# Patient Record
Sex: MALE | NOT HISPANIC OR LATINO | Employment: OTHER | URBAN - METROPOLITAN AREA
[De-identification: names, ages, dates, MRNs, and addresses within clinical notes are randomized per-mention and may not be internally consistent; named-entity substitution may affect disease eponyms.]

---

## 2017-01-16 ENCOUNTER — VIRTUAL VISIT (OUTPATIENT)
Dept: EDUCATION SERVICES | Facility: CLINIC | Age: 52
End: 2017-01-16

## 2017-01-16 DIAGNOSIS — E11.9 TYPE II DIABETES MELLITUS (H): Primary | ICD-10-CM

## 2017-01-16 DIAGNOSIS — Z79.4 TYPE 2 DIABETES MELLITUS WITH HYPERGLYCEMIA, WITH LONG-TERM CURRENT USE OF INSULIN (H): ICD-10-CM

## 2017-01-16 DIAGNOSIS — E11.65 TYPE 2 DIABETES MELLITUS WITH HYPERGLYCEMIA, WITH LONG-TERM CURRENT USE OF INSULIN (H): ICD-10-CM

## 2017-01-16 NOTE — PROGRESS NOTES
Diabetes Educator Note:    Miller calls because   1.  He received a letter from his new insurance company that indicates his short acting insulin will be changed from Humalog to Novolog.  2.  His blood glucoses have been more elevated for the past few weeks.  He states that he has not been feeling well and has not been as active as he typically is.     Discussed the differences between Novolog and Humalog and reassured that he will not notice a change when switching.  Reviewed the action of the pen, storage and administration, none of which is remarkably different between Novolog and Humalog.  Prescription for Novolog sent to his pharmacy.  He is currently taking Tresiba insulin 100 units daily which has been working pretty well.  His states that since the beginning of the year (about 2 weeks),  His fasting blood glucoses have been running between 98 and 276.  Denies missing any of his insulin doses.  Advised for the time being to increase his Tresiba from 100 to 110 units per day.  He states that outside of feeling sort of tired, he does not otherwise feel ill.  Advised to contact his primary care provider if he continues to feel tired and BG's are still elevated after increasing his Tresiba dose.  Also advised to continue to use up his Humalog and then start the Novolog.      Time spent in this telephone visit:  20 minutes.

## 2017-01-26 ENCOUNTER — TELEPHONE (OUTPATIENT)
Dept: GASTROENTEROLOGY | Facility: CLINIC | Age: 52
End: 2017-01-26

## 2017-01-26 DIAGNOSIS — K76.82 HEPATIC ENCEPHALOPATHY (H): Primary | ICD-10-CM

## 2017-01-26 NOTE — TELEPHONE ENCOUNTER
Pt left VM re: Xifaxan. New insurance requiring PA. Not able to immediately reach pt to determine insurance company. Can be reached at [phone]. Message sent to [employer].

## 2017-01-26 NOTE — Clinical Note
Dr. Banuelos,    Pt was denied for tier exception for Xifaxan. Potential for appeal. Used an appeal letter template for medical necessity for medication.     Please review and advise any additions/changes. Information was based off office visit. Longest duration is for 12 months coverage.

## 2017-01-26 NOTE — TELEPHONE ENCOUNTER
Prior Authorization Specialty Medication Request    Medication/Dose: Xifaxan 550mg twice daily by mouth  Diagnosis and ICD: hepatic encephalopathy k72.90  New/Renewal/Insurance Change PA:     Important Lab Values: AST 55; Total bilirubin 1.5    Previously Tried and Failed Therapies: lactulose alone    Rationale: Xifaxan helps to lower the toxin build up in the blood while lactulose works by cleansing the toxin build up in the liver. Adjunctive therapy.    Would you like to include any research articles?    If yes please include the hyperlink(s) below or fax @ [phone].    (Include Name and MRN)    If you received a fax notification from an outside Pharmacy;  Pharmacy Name: [id number]  Pharmacy #: [phone]  Pharmacy Fax: [phone]    Xifaxan - New Insurance  Authorization Needed  Message [id number]     From   Frandy Workman    To   Santi Dotson MD    Sent   1/26/2017  1:38 PM        Dr. Banuelos:     My insurance changed effective January 1.2017 to [employer] Part D [employer] ([employer]).  The new company is requesting a  Authorization from you on my Xifaxan 550MG Tabs (Twice a Day - 60 pills per month).  My new insurance information is as follows:     Name: Frandy OLIVER Workman   ID#: [id number]     The call in number for them is [phone].  They covered me for January but I will need the authorization completed by Early February.     Let me know if you have any questions.     Best regards,

## 2017-01-26 NOTE — LETTER
2017      [facility]  [address]  [place] [address]        RE: Frandy Workman  [address]   [facility] [address]  : 1965  MRN: [id number]  Policy #: [id number]  Case/Reference: [id number]    To Whom It May Concern,    I am writing on behalf of my patient, Frandy Workman to document the medical necessity of Xifaxan 550mg by mouth twice daily for the treatment of hepatic encephalopathy. This letter provides information about the patient's medical history and diagnosis and a statement summarizing my treatment rationale.     Summary of Patient History and Diagnosis  Patient follows at [facility] Hepatology clinic for evaluation and management of ESTRADA cirrhosis.  This was diagnosed in  after a long medical evaluation of tremor.  He was listed for liver transplant at [facility] in May 2014 for hepatic encephalopathy resulting in 5 hospital admissions including one admission to MICU.  He reports that his MELD was 35 at one point.  He was de-listed earlier this year as his MELD score had significantly improved.    Patient is well.  He has been getting serial EGD with banding for esophageal varices.  He is also taking propanolol.  He has never had a variceal bleed.    Patient reports that his ascites and hepatic encephalopathy are well-controlled on medical therapy. His last major episode of hepatic encephalopathy was 6-8 months ago.  He still has issues with fatigue. His current MELD-Na score is 12 according to the MELD calculator on the [place] Department of Health and Human Services website (https://optn.transplant.hrsa.gov/resources/allocation-calculators/meld-calculator/).    Treatment Rationale  Xifaxan helps to lower the toxin build up in the blood while lactulose works by cleansing the toxin build up.  in the liver. Adjunctive therapy. Patient is able to reduce hospitalizations that are based on symptoms of hepatic encephalopathy with  this medication management. Patient unable to do so with lactulose alone and Xifaxan is the only drug that is appropriate for treatment of hepatic encephalopathy (HE) outside of lactulose. A journal article link is provided showing the average cost of a year's rifaximin prescription vs the average cost in savings for patients hospitalized and treated with rifaximin (http://onlinelibrary.cotton.com/doi/10.1111/jessica.20472/full). Average savings in hospital costs for HE patients was  1480- 3228 after deducting a years cost of rifaximin first.    Duration  Requesting 12 months approval for Xifaxan coverage.    Summary  In summary, Xifaxan is medically necessary for this patient s medical condition. Please call my office at [phone] if I can provide you with any additional information to approve my request. I look forward to receiving your timely response and approval of this request.     Sincerely,      Santi Banuelos MD   of Transplant Hepatology and Gastroenterology

## 2017-02-01 ENCOUNTER — OFFICE VISIT (OUTPATIENT)
Dept: EDUCATION SERVICES | Facility: CLINIC | Age: 52
End: 2017-02-01

## 2017-02-01 VITALS — WEIGHT: 189.4 LBS | BODY MASS INDEX: 28.05 KG/M2 | HEIGHT: 69 IN

## 2017-02-01 DIAGNOSIS — E11.9 DIABETES MELLITUS WITHOUT COMPLICATION (H): Primary | ICD-10-CM

## 2017-02-01 NOTE — PATIENT INSTRUCTIONS
1. When you snack between meals, take your Humalog insulin: 1 unit per 5 grams. There is no need to test your blood sugar between meals unless you feel low.  2. When you are low, try to limit yourself to only juice; either 4 ounces (1/2 cup) for 15 grams carb or 8 oz (1cup) for 30 grams carb. Wait 15 minutes and retest your blood sugar again to see if you are ok or need more juice depending on your blood sugar.  3. At night, limit yourself to one snack with carbs so you can take Humalog. After that, if you are still hungry, try eating low or no carb snacks reviewed today so you don't have to take Humalog again and your blood sugars won't go too high  4. If you are going to walk for more than 30 minutes, drink 4 oz juice or 15 grams carbohydrate to prevent low blood sugar.   5. We reviewed carbs again in the menus your brought  6. Follow up as needed  Julia Lee RD, LD, CDE  Diabetes Care  [address]  [address]  [place]  [address]  Phone: [phone]  Appointment line: [phone]  Email: [email or web address]

## 2017-02-01 NOTE — Clinical Note
Hi Dr. Wilcox, you will be seeing Miller again in 2 weeks. I saw him today, his bg are all above target as he stopped using chewing tobacco and is eating more, especially snacking more and not taking Humalog for snacks. We reviewed this today and I told him to take Humalog for all snacks, but to cut down as well if possible with snacking. He sees you again in 2 weeks, I did not make any insulin adjustments today. Thanks, Julia Lee RD, LD, CDE Diabetes Care [address] [place] [address] Phone: [phone] Appointment line: [phone] Email: [email or web address]

## 2017-02-01 NOTE — TELEPHONE ENCOUNTER
[facility] Prior Authorization Team   Phone: [phone]  Fax: [phone]      PA Initiation    Medication: Xifaxan 550mg  Insurance Company: [employer] - Phone [phone] Fax [phone]  Pharmacy Filling the Rx: [id number] - [place], [place] - [address]  Filling Pharmacy Phone: [phone]  Filling Pharmacy Fax: [phone]  Start Date: 2/1/2017

## 2017-02-01 NOTE — MR AVS SNAPSHOT
After Visit Summary   2/1/2017    Frandy Workman    MRN: [id number]           Patient Information     Date Of Birth          1965        Visit Information        Provider Department      2/1/2017 1:30 PM Julia Lee RD [facility] Diabetes        Care Instructions    1. When you snack between meals, take your Humalog insulin: 1 unit per 5 grams. There is no need to test your blood sugar between meals unless you feel low.  2. When you are low, try to limit yourself to only juice; either 4 ounces (1/2 cup) for 15 grams carb or 8 oz (1cup) for 30 grams carb. Wait 15 minutes and retest your blood sugar again to see if you are ok or need more juice depending on your blood sugar.  3. At night, limit yourself to one snack with carbs so you can take Humalog. After that, if you are still hungry, try eating low or no carb snacks reviewed today so you don't have to take Humalog again and your blood sugars won't go too high  4. If you are going to walk for more than 30 minutes, drink 4 oz juice or 15 grams carbohydrate to prevent low blood sugar.   5. We reviewed carbs again in the menus your brought  6. Follow up as needed  Julia Lee RD, LD, CDE  Diabetes Care  [address]  [address] [place] [address]  [address]  Phone: [phone]  Appointment line: [phone]  Email: [email or web address]            Follow-ups after your visit        Your next 10 appointments already scheduled     Feb 20, 2017  1:00 PM   (Arrive by 12:30 PM)   RETURN ENDOCRINE with Jennifer Wilcox MD   [facility] Endocrinology ([facility] [place])    [address] [place] [address]  3rd [facility] [address] [phone]            Mar 09, 2017  1:00 PM   (Arrive by 12:45 PM)   Return Visit with Natalie Russell MD   [facility] Primary Care Clinic ([facility] [place])    [address] [place] [address]  4th [facility] [address]    [phone]            Apr 17, 2017  1:00 PM   (Arrive by 12:45 PM)   Return General Liver with Santi Dotson MD   [facility] Hepatology ([facility] [place])    [address] [place] [address] [phone]              Who to contact     Please call your clinic at [phone] to:    Ask questions about your health    Make or cancel appointments    Discuss your medicines    Learn about your test results    Speak to your doctor   If you have compliments or concerns about an experience at your clinic, or if you wish to file a complaint, please contact [facility] Patient Relations at [phone] or email us at [email or web address]         Additional Information About Your Visit        [employer] Information     [employer] gives you secure access to your electronic health record. If you see a primary care provider, you can also send messages to your care team and make appointments. If you have questions, please call your primary care clinic.  If you do not have a primary care provider, please call [phone] and they will assist you.      [employer] is an electronic gateway that provides easy, online access to your medical records. With [employer], you can request a clinic appointment, read your test results, renew a prescription or communicate with your care team.     To access your existing account, please contact your [facility] or call [phone] for assistance.        Care EveryWhere ID     This is your Care EveryWhere ID. This could be used by other organizations to access your [facility] medical records  [id number]         Blood Pressure from Last 3 Encounters:   12/08/16 117/73   11/17/16 99/61   11/16/16 132/77    Weight from Last 3 Encounters:   12/19/16 84.142 kg (185 lb 8 oz)   12/08/16 84.369 kg (186 lb)   11/21/16 90.357 kg (199 lb 3.2 oz)              Today, you had the following     No  orders found for display       Primary Care Provider Office Phone # Fax #    Natalie Mitzi Andrés Russell [phone]       [address]  [facility] [address]        Thank you!     Thank you for choosing [facility]  for your care. Our goal is always to provide you with excellent care. Hearing back from our patients is one way we can continue to improve our services. Please take a few minutes to complete the written survey that you may receive in the mail after your visit with us. Thank you!             Your Updated Medication List - Protect others around you: Learn how to safely use, store and throw away your medicines at www.disposemymeds.org.          This list is accurate as of: 2/1/17  2:23 PM.  Always use your most recent med list.                   Brand Name Dispense Instructions for use    aspirin 81 MG tablet     90 tablet    Take 1 tablet (81 mg) by mouth daily       BD VIKTORIA U/F 32G X 4 MM   Generic drug:  insulin pen needle      U 6 D       blood glucose monitoring lancets     3 Box    Use to test blood sugar 4 times daily or as directed.  1 box = 102 lancets       blood glucose monitoring test strip    ACCU-CHEK EDINSON PLUS    400 each    Use to test blood sugar 4 times daily       carvedilol 12.5 MG tablet    COREG    180 tablet    Take 1 tablet (12.5 mg) by mouth 2 times daily (with meals)       insulin aspart 100 UNIT/ML injection    NovoLOG FLEXPEN    24 mL    1 unit per 4 Gms CHO at meals and snacks + correction scale of 1 unit per 25 mg/dL over 125. Average daily dose is 75 units.       insulin degludec 200 UNIT/ML pen    TRESIBA    18 mL    Take 110 units daily.       lactulose 10 GM/15ML solution    CHRONULAC     Take 60 mLs by mouth 4 times daily       OLANZapine 2.5 MG tablet    zyPREXA    30 tablet    Take 1 tablet (2.5 mg) by mouth At Bedtime       omeprazole 40 MG capsule    priLOSEC     Take 40 mg by mouth daily       potassium chloride SA  20 MEQ CR tablet    K-DUR/KLOR-CON M     Take 40 mEq by mouth every morning       RA VITAMIN B-12 TR 1000 MCG Tbcr   Generic drug:  cyanocobalamin      Take 1,000 mcg by mouth daily       rifaximin 550 MG Tabs tablet    XIFAXAN    60 tablet    Take 1 tablet (550 mg) by mouth 2 times daily       sildenafil 50 MG cap/tab    REVATIO/VIAGRA    12 tablet    Take 0.5-1 tablets (25-50 mg) by mouth daily as needed for erectile dysfunction Take 30 min to 4 hours before intercourse       simvastatin 20 MG tablet    ZOCOR    90 tablet    Take 1 tablet (20 mg) by mouth At Bedtime       spironolactone 25 MG tablet    ALDACTONE     Take 25 mg by mouth daily       tadalafil 5 MG tablet    CIALIS    20 tablet    Take 1 tablet (5 mg) by mouth as needed for erectile dysfunction       tamsulosin 0.4 MG capsule    FLOMAX     Take 0.4 mg by mouth daily       THERAVITE PO      Take 1 tablet by mouth daily

## 2017-02-07 NOTE — TELEPHONE ENCOUNTER
Prior Authorization Approval    Authorization Effective Date: 12/30/2016  Authorization Expiration Date: 12/31/2017  Medication: Xifaxan 550mg- approved  Approved Dose/Quantity:   Reference #: [id number]   Insurance Company: [employer] - Phone [phone] Fax [phone]  Expected CoPay: $700.00     CoPay Card Available:      Foundation Assistance Needed:    Which Pharmacy is filling the prescription (Not needed for infusion/clinic administered): [id number] - [place], [place] - [address]  Pharmacy Notified: Yes  Patient Notified: Yes, pt picked up script on 01/22/17

## 2017-02-07 NOTE — TELEPHONE ENCOUNTER
Tier exception Initiation and denial    We have initiated an appeal for the requested medication:  Medication: Xifaxan 550mg- approved  Appeal Start Date:     Insurance Company:    Comments:       Medication is at a tier 5, specialty tier, and there is no tier exception available for this tier. I have a denial for the tier request.    Appeal is available

## 2017-02-14 ASSESSMENT — ENCOUNTER SYMPTOMS
SORE THROAT: 0
ALTERED TEMPERATURE REGULATION: 1
STIFFNESS: 1
SHORTNESS OF BREATH: 0
FEVER: 1
COUGH: 1
POLYPHAGIA: 1
MUSCLE WEAKNESS: 1
WHEEZING: 0
DYSPNEA ON EXERTION: 0
POSTURAL DYSPNEA: 0
EYE WATERING: 0
SNORES LOUDLY: 1
DECREASED APPETITE: 0
NECK MASS: 0
JAUNDICE: 0
CONSTIPATION: 0
CHILLS: 0
NIGHT SWEATS: 1
SWOLLEN GLANDS: 0
BACK PAIN: 1
HALLUCINATIONS: 0
HEARTBURN: 0
WEIGHT GAIN: 1
MYALGIAS: 0
SINUS PAIN: 1
TROUBLE SWALLOWING: 0
HEMOPTYSIS: 0
JOINT SWELLING: 0
EYE PAIN: 0
SKIN CHANGES: 0
EYE REDNESS: 1
POLYDIPSIA: 0
BLOATING: 1
RECTAL BLEEDING: 0
BLOOD IN STOOL: 0
EYE IRRITATION: 1
NECK PAIN: 0
SINUS CONGESTION: 1
BOWEL INCONTINENCE: 0
HOARSE VOICE: 1
COUGH DISTURBING SLEEP: 0
MUSCLE CRAMPS: 0
SMELL DISTURBANCE: 0
WEIGHT LOSS: 0
NAIL CHANGES: 1
VOMITING: 0
TASTE DISTURBANCE: 0
INCREASED ENERGY: 1
ARTHRALGIAS: 0
DOUBLE VISION: 0
FATIGUE: 1
RECTAL PAIN: 0
POOR WOUND HEALING: 1
ABDOMINAL PAIN: 0
BRUISES/BLEEDS EASILY: 1
SPUTUM PRODUCTION: 1
NAUSEA: 0
DIARRHEA: 1
RESPIRATORY PAIN: 0

## 2017-02-14 NOTE — TELEPHONE ENCOUNTER
Medication Appeal Initiation    We have initiated an appeal for the requested medication:  Medication: Xifaxan 550mg- PA Approved - Appeal (Tier) Initiated  Appeal Start Date:  2/14/2017  Insurance Company: [employer] - Phone [phone] Fax [phone]  Comments:  Letter of medical necessity has been faxed to [employer] Appeals Dept @ [phone].

## 2017-02-17 NOTE — TELEPHONE ENCOUNTER
MEDICATION APPEAL DENIED    Medication: Xifaxan 550mg- PA Approved - Appeal (Tier) Initiated - Denied     Denial Date: 2/15/2017    Denial Rational:   Tiering exception appeal was denied stating xifaxan is a specialty medication. A second level appeal is available     Second Level Appeal Information: Second level appeals will be managed by the clinic staff and provider. Please contact the [employer] Prior Authorization Team if additional information about the denial is needed.

## 2017-02-20 ENCOUNTER — OFFICE VISIT (OUTPATIENT)
Dept: ENDOCRINOLOGY | Facility: CLINIC | Age: 52
End: 2017-02-20

## 2017-02-20 VITALS
DIASTOLIC BLOOD PRESSURE: 75 MMHG | BODY MASS INDEX: 28.58 KG/M2 | HEART RATE: 66 BPM | WEIGHT: 193 LBS | HEIGHT: 69 IN | SYSTOLIC BLOOD PRESSURE: 115 MMHG

## 2017-02-20 DIAGNOSIS — E11.65 TYPE 2 DIABETES MELLITUS WITH HYPERGLYCEMIA, WITH LONG-TERM CURRENT USE OF INSULIN (H): Primary | ICD-10-CM

## 2017-02-20 DIAGNOSIS — Z79.4 TYPE 2 DIABETES MELLITUS WITH HYPERGLYCEMIA, WITH LONG-TERM CURRENT USE OF INSULIN (H): Primary | ICD-10-CM

## 2017-02-20 LAB — HBA1C MFR BLD: 7.4 % (ref 4.3–6)

## 2017-02-20 ASSESSMENT — PAIN SCALES - GENERAL: PAINLEVEL: NO PAIN (0)

## 2017-02-20 NOTE — NURSING NOTE
Chief Complaint   Patient presents with     RECHECK     F/U TYPE II DM     Alisha Cotto, [facility]  Endocrinology & Diabetes 3G

## 2017-02-20 NOTE — PROGRESS NOTES
Assessment/Treatment Plan:      Frandy Workman is a 51 year old year old male with    1. Type 2 Diabetes since early 30s with improving BG control with neuropathy.   A1c 7.8 last visit improved to 7.4 today  There is no evidence of chronic diabetic complications.  A1c is not a reliable indicator of glycemic control: Anemia, does not correlate with BG levels. However, improving A1c is reassuring.       Major barrier to achieving glycemic control are:   HS snack: check BG at 2 am once or twice a week.   BG is high all day long: diet with high carb in it.   Insulin Resistance and weight gain on insulin: We will reach out to Dr Banuelos to see if he is agreeable with Metformin low dose start. This may reduce his insulin requirement and further improve glycemic control.      Patient Instructions   Test BG once or twice a week at 2 am    We will contact you after contacting Dr Banuelos.           Test and/or medications prescribed today:  Orders Placed This Encounter   Procedures     PODIATRY/FOOT & ANKLE SURGERY REFERRAL     Hemoglobin A1c POCT     Jennifer Wilcox MD  [id number]  Endocrinology Service        =================================================    Endocrinology Clinic Visit    Chief Complaint: RECHECK (F/U TYPE II DM)       Subjective:         HPI: Frandy Workman is a 51 year old year old male with history of ESTRADA with liver cirrhosis who is seen in follow up for T2DM - uncontrolled. He recently moved from [place] to [place] to be close to his R and his BG has been uncontrolled now. He attributes it to lack of exercise as he did and diet changes in the assisted living. His A1c in the [facility] was better but on review of records from the past his BG were still high back in [place].     He has had diabetes since 2001. He was intially started on Metformin but after diagnosis of liver failure, this was discontinued.     Neuropathy - tingling in feet bilateral   Nephropathy - none  Retinopathy - no per patient (exam  2016)   Hypoglycemia - none    Prevention  On Statin.     Has polyphagia, no polydipsia or polyuria.   No change in Bowel habits.   Appetite increased since moving to [place]  Weight gain  No neck pain or difficulty swallowing.     Barriers:   A1c is not a reliable indicator of glycemic control: Anemia, does not correlate with BG levels.   BG is high all day long: diet with high carb in it.   ?? Poor absorption of Lantus high higher dose.   Unlikely that patient is having lows in the night.   Lack of oral medication use due to liver cirrhosis.     Download from her meter shows:   sd 91()  premeal 224 sd 95  No lows  Highest BG at HS: snacking     Insulin regimen  Tresiba 110 units daily  Aspart 1 units per 4 gm CHO  Correction two units per 25 mg/dL over 100.    Diet - unchanged.   Breakfast toast or Oatmeal or cereal  Lunch deli turkey or ham  Dinner fish or pork with soup or salad  Snack  His major problem is overnight snacking.  He is constantly eating at night.    Exercise  He is walking 3-6 months daily.    A1c today is 7.4.  This does not correlate with patient's blood sugars.       No Known Allergies    Current Outpatient Prescriptions   Medication Sig Dispense Refill     rifaximin (XIFAXAN) 550 MG TABS tablet Take 1 tablet (550 mg) by mouth 2 times daily 60 tablet 11     insulin degludec (TRESIBA) 200 UNIT/ML pen Take 110 units daily. 18 mL 11     insulin aspart (NOVOLOG FLEXPEN) 100 UNIT/ML injection 1 unit per 4 Gms CHO at meals and snacks + correction scale of 1 unit per 25 mg/dL over 125. Average daily dose is 75 units. 24 mL 11     OLANZapine (ZYPREXA) 2.5 MG tablet Take 1 tablet (2.5 mg) by mouth At Bedtime 30 tablet 3     blood glucose monitoring (ACCU-CHEK EDINSON PLUS) test strip Use to test blood sugar 4 times daily 400 each 3     blood glucose monitoring (ACCU-CHEK FASTCLIX) lancets Use to test blood sugar 4 times daily or as directed.  1 box = 102 lancets 3 Box 3     tadalafil (CIALIS) 5 MG  tablet Take 1 tablet (5 mg) by mouth as needed for erectile dysfunction 20 tablet 1     carvedilol (COREG) 12.5 MG tablet Take 1 tablet (12.5 mg) by mouth 2 times daily (with meals) 180 tablet 3     simvastatin (ZOCOR) 20 MG tablet Take 1 tablet (20 mg) by mouth At Bedtime 90 tablet 3     sildenafil (VIAGRA) 50 MG tablet Take 0.5-1 tablets (25-50 mg) by mouth daily as needed for erectile dysfunction Take 30 min to 4 hours before intercourse 12 tablet 11     aspirin 81 MG tablet Take 1 tablet (81 mg) by mouth daily 90 tablet 3     cyanocobalamin (RA VITAMIN B-12 TR) 1000 MCG TBCR Take 1,000 mcg by mouth daily       lactulose (CHRONULAC) 10 GM/15ML solution Take 60 mLs by mouth 4 times daily       omeprazole (PRILOSEC) 40 MG capsule Take 40 mg by mouth daily       insulin pen needle (BD VIKTORIA U/F) 32G X 4 MM U 6 D       potassium chloride SA (K-DUR,KLOR-CON M) 20 MEQ tablet Take 40 mEq by mouth every morning       spironolactone (ALDACTONE) 25 MG tablet Take 25 mg by mouth daily       tamsulosin (FLOMAX) 0.4 MG 24 hr capsule Take 0.4 mg by mouth daily       Multiple Vitamin (THERAVITE PO) Take 1 tablet by mouth daily         Review of Systems   No eye symptoms  No headache, change in vision, loss of peripheral vision  No neck pain, no other lumps in the neck  No change in breathing    No change in swallowing.    No swelling in extremities  No change in mental status.    Other ROS that were obtained were negative.         Past Medical History   Diagnosis Date     BPH (benign prostatic hyperplasia)      Cholelithiasis      Hyperlipidemia      Liver cirrhosis secondary to ESTRADA (H)      Type II diabetes mellitus (H)        Past Surgical History   Procedure Laterality Date     Knee surgery Left      Esophagoscopy, gastroscopy, duodenoscopy (egd), combined N/A 11/17/2016     Procedure: COMBINED ESOPHAGOSCOPY, GASTROSCOPY, DUODENOSCOPY (EGD);  Surgeon: Santi Rosas MD;  Location:  GI       Family History  "  Problem Relation Age of Onset     Prostate Cancer Maternal Grandfather      Colon Cancer Father 60     Pancreatic Cancer Father 60     Liver Disease No family hx of        Social History     Social History     Marital Status:      Spouse Name: N/A     Number of Children: N/A     Years of Education: N/A     Social History Main Topics     Smoking status: Never Smoker      Smokeless tobacco: Current User      Comment: 1 tin per week     Alcohol Use: No      Comment: quit Sept. 1996     Drug Use: No     Sexual Activity: Not Asked     Other Topics Concern     None     Social History Narrative       Objective:   /75  Pulse 66  Ht 1.753 m (5' 9\")  Wt 87.5 kg (193 lb)  BMI 28.5 kg/m2  Constitutional: Appears well-developed and well-nourished. Active.   EYES: anicteric, normal extra-ocular movements, no lid lag or retraction   HEENT: Mouth/Throat: Mucous membrane is moist. Oropharynx is clear. No adenopathy. Normal thyroid   Cardiovascular: RRR, S1, S2 normal. No m/g/r   Pulmonary/Chest: CTAB. No wheezing or rales   Abdominal: +BS. Distended.   Neurological: Alert. Normal affect. CNII-XII intact. Muscle strength 5/5. Sensory is intact.  Extremities: No clubbing, cyanosis or inflammation   Skin: normal texture, color  Feet: Tingling +. Sensation is intact.   Lymphatic: no cervical lymphadenopathy.  Psychological: appropriate mood     In House Labs:   A1C      7.8   10/25/2016    No results found for: TSH, T4    Creatinine   Date Value Ref Range Status   12/19/2016 1.14 0.66 - 1.25 mg/dL Final   ]    Recent Labs   Lab Test  10/25/16   1731   CHOL  164   HDL  33*   LDL  90   TRIG  205*         "

## 2017-02-20 NOTE — LETTER
2/20/2017       RE: Frandy Workman  [address]   [facility] [facility] [address]     Dear Colleague,    Thank you for referring your patient, Frandy Workman, to the [facility] ENDOCRINOLOGY at [facility]. Please see a copy of my visit note below.      Assessment/Treatment Plan:      Frandy Workman is a 51 year old year old male with    1. Type 2 Diabetes since early 30s with improving BG control with neuropathy.   A1c 7.8 last visit improved to 7.4 today  There is no evidence of chronic diabetic complications.  A1c is not a reliable indicator of glycemic control: Anemia, does not correlate with BG levels. However, improving A1c is reassuring.       Major barrier to achieving glycemic control are:   HS snack: check BG at 2 am once or twice a week.   BG is high all day long: diet with high carb in it.   Insulin Resistance and weight gain on insulin: We will reach out to Dr Banuelos to see if he is agreeable with Metformin low dose start. This may reduce his insulin requirement and further improve glycemic control.      Patient Instructions   Test BG once or twice a week at 2 am    We will contact you after contacting Dr Banuelos.           Test and/or medications prescribed today:  Orders Placed This Encounter   Procedures     PODIATRY/FOOT & ANKLE SURGERY REFERRAL     Hemoglobin A1c POCT     Jennifer Wilcox MD  [id number]  Endocrinology Service        =================================================    Endocrinology Clinic Visit    Chief Complaint: RECHECK (F/U TYPE II DM)       Subjective:         HPI: Frandy Workman is a 51 year old year old male with history of ESTRADA with liver cirrhosis who is seen in follow up for T2DM - uncontrolled. He recently moved from [place] to [place] to be close to his DTR and his BG has been uncontrolled now. He attributes it to lack of exercise as he did and diet changes in the assisted living. His A1c in the [facility] was better but on review of records from the  past his BG were still high back in [place].     He has had diabetes since 2001. He was intially started on Metformin but after diagnosis of liver failure, this was discontinued.     Neuropathy - tingling in feet bilateral   Nephropathy - none  Retinopathy - no per patient (exam 2016)   Hypoglycemia - none    Prevention  On Statin.     Has polyphagia, no polydipsia or polyuria.   No change in Bowel habits.   Appetite increased since moving to [place]  Weight gain  No neck pain or difficulty swallowing.     Barriers:   A1c is not a reliable indicator of glycemic control: Anemia, does not correlate with BG levels.   BG is high all day long: diet with high carb in it.   ?? Poor absorption of Lantus high higher dose.   Unlikely that patient is having lows in the night.   Lack of oral medication use due to liver cirrhosis.     Download from her meter shows:   sd 91()  premeal 224 sd 95  No lows  Highest BG at HS: snacking     Insulin regimen  Tresiba 110 units daily  Aspart 1 units per 4 gm CHO  Correction two units per 25 mg/dL over 100.    Diet - unchanged.   Breakfast toast or Oatmeal or cereal  Lunch deli turkey or ham  Dinner fish or pork with soup or salad  Snack  His major problem is overnight snacking.  He is constantly eating at night.    Exercise  He is walking 3-6 months daily.    A1c today is 7.4.  This does not correlate with patient's blood sugars.       No Known Allergies    Current Outpatient Prescriptions   Medication Sig Dispense Refill     rifaximin (XIFAXAN) 550 MG TABS tablet Take 1 tablet (550 mg) by mouth 2 times daily 60 tablet 11     insulin degludec (TRESIBA) 200 UNIT/ML pen Take 110 units daily. 18 mL 11     insulin aspart (NOVOLOG FLEXPEN) 100 UNIT/ML injection 1 unit per 4 Gms CHO at meals and snacks + correction scale of 1 unit per 25 mg/dL over 125. Average daily dose is 75 units. 24 mL 11     OLANZapine (ZYPREXA) 2.5 MG tablet Take 1 tablet (2.5 mg) by mouth At Bedtime 30 tablet  3     blood glucose monitoring (ACCU-CHEK EDINSON PLUS) test strip Use to test blood sugar 4 times daily 400 each 3     blood glucose monitoring (ACCU-CHEK FASTCLIX) lancets Use to test blood sugar 4 times daily or as directed.  1 box = 102 lancets 3 Box 3     tadalafil (CIALIS) 5 MG tablet Take 1 tablet (5 mg) by mouth as needed for erectile dysfunction 20 tablet 1     carvedilol (COREG) 12.5 MG tablet Take 1 tablet (12.5 mg) by mouth 2 times daily (with meals) 180 tablet 3     simvastatin (ZOCOR) 20 MG tablet Take 1 tablet (20 mg) by mouth At Bedtime 90 tablet 3     sildenafil (VIAGRA) 50 MG tablet Take 0.5-1 tablets (25-50 mg) by mouth daily as needed for erectile dysfunction Take 30 min to 4 hours before intercourse 12 tablet 11     aspirin 81 MG tablet Take 1 tablet (81 mg) by mouth daily 90 tablet 3     cyanocobalamin (RA VITAMIN B-12 TR) 1000 MCG TBCR Take 1,000 mcg by mouth daily       lactulose (CHRONULAC) 10 GM/15ML solution Take 60 mLs by mouth 4 times daily       omeprazole (PRILOSEC) 40 MG capsule Take 40 mg by mouth daily       insulin pen needle (BD VIKTORIA U/F) 32G X 4 MM U 6 D       potassium chloride SA (K-DUR,KLOR-CON M) 20 MEQ tablet Take 40 mEq by mouth every morning       spironolactone (ALDACTONE) 25 MG tablet Take 25 mg by mouth daily       tamsulosin (FLOMAX) 0.4 MG 24 hr capsule Take 0.4 mg by mouth daily       Multiple Vitamin (THERAVITE PO) Take 1 tablet by mouth daily         Review of Systems   No eye symptoms  No headache, change in vision, loss of peripheral vision  No neck pain, no other lumps in the neck  No change in breathing    No change in swallowing.    No swelling in extremities  No change in mental status.    Other ROS that were obtained were negative.         Past Medical History   Diagnosis Date     BPH (benign prostatic hyperplasia)      Cholelithiasis      Hyperlipidemia      Liver cirrhosis secondary to ESTRADA (H)      Type II diabetes mellitus (H)        Past Surgical History  "  Procedure Laterality Date     Knee surgery Left      Esophagoscopy, gastroscopy, duodenoscopy (egd), combined N/A 11/17/2016     Procedure: COMBINED ESOPHAGOSCOPY, GASTROSCOPY, DUODENOSCOPY (EGD);  Surgeon: Sanit Rosas MD;  Location:  GI       Family History   Problem Relation Age of Onset     Prostate Cancer Maternal Grandfather      Colon Cancer Father 60     Pancreatic Cancer Father 60     Liver Disease No family hx of        Social History     Social History     Marital Status:      Spouse Name: N/A     Number of Children: N/A     Years of Education: N/A     Social History Main Topics     Smoking status: Never Smoker      Smokeless tobacco: Current User      Comment: 1 tin per week     Alcohol Use: No      Comment: quit Sept. 1996     Drug Use: No     Sexual Activity: Not Asked     Other Topics Concern     None     Social History Narrative       Objective:   /75  Pulse 66  Ht 1.753 m (5' 9\")  Wt 87.5 kg (193 lb)  BMI 28.5 kg/m2  Constitutional: Appears well-developed and well-nourished. Active.   EYES: anicteric, normal extra-ocular movements, no lid lag or retraction   HEENT: Mouth/Throat: Mucous membrane is moist. Oropharynx is clear. No adenopathy. Normal thyroid   Cardiovascular: RRR, S1, S2 normal. No m/g/r   Pulmonary/Chest: CTAB. No wheezing or rales   Abdominal: +BS. Distended.   Neurological: Alert. Normal affect. CNII-XII intact. Muscle strength 5/5. Sensory is intact.  Extremities: No clubbing, cyanosis or inflammation   Skin: normal texture, color  Feet: Tingling +. Sensation is intact.   Lymphatic: no cervical lymphadenopathy.  Psychological: appropriate mood     In House Labs:   A1C      7.8   10/25/2016    No results found for: TSH, T4    Creatinine   Date Value Ref Range Status   12/19/2016 1.14 0.66 - 1.25 mg/dL Final   ]    Recent Labs   Lab Test  10/25/16   1731   CHOL  164   HDL  33*   LDL  90   TRIG  205*     Sincerely,    Jennifer Wilcox MD      "

## 2017-02-20 NOTE — MR AVS SNAPSHOT
After Visit Summary   2/20/2017    Frandy Workman    MRN: [id number]           Patient Information     Date Of Birth          1965        Visit Information        Provider Department      2/20/2017 1:00 PM Jennifer Wilcox MD [facility] Endocrinology        Today's Diagnoses     Type 2 diabetes mellitus with hyperglycemia, with long-term current use of insulin (H)    -  1      Care Instructions    Test BG once or twice a week at 2 am    We will contact you after contacting Dr Banuelos.             Follow-ups after your visit        Additional Services     PODIATRY/FOOT & ANKLE SURGERY REFERRAL       Your provider has referred you to: OTHER PROVIDERS: Lamin Gonzalez.    Please be aware that coverage of these services is subject to the terms and limitations of your health insurance plan.  Call member services at your health plan with any benefit or coverage questions.      Please bring the following to your appointment:  >>   Any x-rays, CTs or MRIs which have been performed.  Contact the facility where they were done to arrange for  prior to your scheduled appointment.    >>   List of current medications   >>   This referral request   >>   Any documents/labs given to you for this referral                  Follow-up notes from your care team     Return in about 4 months (around 6/20/2017).      Your next 10 appointments already scheduled     Mar 09, 2017 12:30 PM CST   (Arrive by 12:15 PM)   Return Visit with Lamin Gonzalez DPM   [facility] Endocrinology ([facility])    [address] [place] [address]  3rd [facility] [id number] [phone]            Mar 09, 2017  1:00 PM CST   (Arrive by 12:45 PM)   Return Visit with Natalie Russell MD   [facility] ([facility])    [address] [place] [address]  4th [facility] [id number] [phone]            Apr 17, 2017  1:00 PM CDT   (Arrive by 12:45 PM)  "  Return General Liver with Santi Dotson MD   [facility] Hepatology ([facility])    [address] [address] [phone]            Jun 21, 2017 12:15 PM CDT   (Arrive by 12:00 PM)   RETURN ENDOCRINE with Jennifer Wilcox MD   [facility] Endocrinology ([facility])    [address] [address] [phone]              Who to contact     Please call your clinic at [phone] to:    Ask questions about your health    Make or cancel appointments    Discuss your medicines    Learn about your test results    Speak to your doctor   If you have compliments or concerns about an experience at your clinic, or if you wish to file a complaint, please contact [facility] Patient Relations at [phone] or email us at [email or web address]         Additional Information About Your Visit        [employer] Information     [employer] gives you secure access to your electronic health record. If you see a primary care provider, you can also send messages to your care team and make appointments. If you have questions, please call your primary care clinic.  If you do not have a primary care provider, please call [phone] and they will assist you.      [employer] is an electronic gateway that provides easy, online access to your medical records. With [employer], you can request a clinic appointment, read your test results, renew a prescription or communicate with your care team.     To access your existing account, please contact your [facility] or call [phone] for assistance.        Care EveryWhere ID     This is your Care EveryWhere ID. This could be used by other organizations to access your [facility] medical records  [id number]        Your Vitals Were     Pulse Height BMI (Body Mass Index)             66 1.753 m (5' 9\") 28.5 " kg/m2          Blood Pressure from Last 3 Encounters:   02/20/17 115/75   12/08/16 117/73   11/17/16 99/61    Weight from Last 3 Encounters:   02/20/17 87.5 kg (193 lb)   02/01/17 85.9 kg (189 lb 6.4 oz)   12/19/16 84.1 kg (185 lb 8 oz)              We Performed the Following     Hemoglobin A1c POCT     PODIATRY/FOOT & ANKLE SURGERY REFERRAL        Primary Care Provider Office Phone # Fax #    Natalie Mitzi Russell [phone]       [address]  [facility] [address]        Thank you!     Thank you for choosing [facility] ENDOCRINOLOGY  for your care. Our goal is always to provide you with excellent care. Hearing back from our patients is one way we can continue to improve our services. Please take a few minutes to complete the written survey that you may receive in the mail after your visit with us. Thank you!             Your Updated Medication List - Protect others around you: Learn how to safely use, store and throw away your medicines at www.disposemymeds.org.          This list is accurate as of: 2/20/17 11:59 PM.  Always use your most recent med list.                   Brand Name Dispense Instructions for use    aspirin 81 MG tablet     90 tablet    Take 1 tablet (81 mg) by mouth daily       BD VIKTORIA U/F 32G X 4 MM   Generic drug:  insulin pen needle      U 6 D       blood glucose monitoring lancets     3 Box    Use to test blood sugar 4 times daily or as directed.  1 box = 102 lancets       blood glucose monitoring test strip    ACCU-CHEK EDINSON PLUS    400 each    Use to test blood sugar 4 times daily       carvedilol 12.5 MG tablet    COREG    180 tablet    Take 1 tablet (12.5 mg) by mouth 2 times daily (with meals)       insulin aspart 100 UNIT/ML injection    NovoLOG FLEXPEN    24 mL    1 unit per 4 Gms CHO at meals and snacks + correction scale of 1 unit per 25 mg/dL over 125. Average daily dose is 75 units.       insulin degludec 200 UNIT/ML pen    TRESIBA     18 mL    Take 110 units daily.       lactulose 10 GM/15ML solution    CHRONULAC     Take 60 mLs by mouth 4 times daily       OLANZapine 2.5 MG tablet    zyPREXA    30 tablet    Take 1 tablet (2.5 mg) by mouth At Bedtime       omeprazole 40 MG capsule    priLOSEC     Take 40 mg by mouth daily       potassium chloride SA 20 MEQ CR tablet    K-DUR/KLOR-CON M     Take 40 mEq by mouth every morning       RA VITAMIN B-12 TR 1000 MCG Tbcr   Generic drug:  cyanocobalamin      Take 1,000 mcg by mouth daily       rifaximin 550 MG Tabs tablet    XIFAXAN    60 tablet    Take 1 tablet (550 mg) by mouth 2 times daily       sildenafil 50 MG cap/tab    REVATIO/VIAGRA    12 tablet    Take 0.5-1 tablets (25-50 mg) by mouth daily as needed for erectile dysfunction Take 30 min to 4 hours before intercourse       simvastatin 20 MG tablet    ZOCOR    90 tablet    Take 1 tablet (20 mg) by mouth At Bedtime       spironolactone 25 MG tablet    ALDACTONE     Take 25 mg by mouth daily       tadalafil 5 MG tablet    CIALIS    20 tablet    Take 1 tablet (5 mg) by mouth as needed for erectile dysfunction       tamsulosin 0.4 MG capsule    FLOMAX     Take 0.4 mg by mouth daily       THERAVITE PO      Take 1 tablet by mouth daily

## 2017-02-21 ENCOUNTER — MEDICAL CORRESPONDENCE (OUTPATIENT)
Dept: HEALTH INFORMATION MANAGEMENT | Facility: CLINIC | Age: 52
End: 2017-02-21

## 2017-02-23 ENCOUNTER — TELEPHONE (OUTPATIENT)
Dept: ENDOCRINOLOGY | Facility: CLINIC | Age: 52
End: 2017-02-23

## 2017-02-23 NOTE — TELEPHONE ENCOUNTER
Pt called regarding appointment with Dr Gonzalez on March 9. Pt has an appointment @ 12:30P with Dr Gonzalez but also has an appointment with Dr Bowen in [facility] @ 1P. Spoke with Natalie  Clinic coordinator. Pt should be able to make both appts.

## 2017-03-01 ENCOUNTER — MYC MEDICAL ADVICE (OUTPATIENT)
Dept: ENDOCRINOLOGY | Facility: CLINIC | Age: 52
End: 2017-03-01

## 2017-03-01 DIAGNOSIS — Z79.4 TYPE 2 DIABETES MELLITUS WITH HYPERGLYCEMIA, WITH LONG-TERM CURRENT USE OF INSULIN (H): Primary | ICD-10-CM

## 2017-03-01 DIAGNOSIS — E11.65 TYPE 2 DIABETES MELLITUS WITH HYPERGLYCEMIA, WITH LONG-TERM CURRENT USE OF INSULIN (H): Primary | ICD-10-CM

## 2017-03-07 RX ORDER — METFORMIN HCL 500 MG
500 TABLET, EXTENDED RELEASE 24 HR ORAL
Qty: 30 TABLET | Refills: 3 | Status: SHIPPED | OUTPATIENT
Start: 2017-03-07 | End: 2017-03-31

## 2017-03-07 NOTE — TELEPHONE ENCOUNTER
----- Message from Santi Dotson MD sent at 3/6/2017  5:38 PM CST -----  Hi    That should be fine.      Thanks for checking    pavan  ----- Message -----     From: Jennifer Wilcox MD     Sent: 3/6/2017  10:48 AM       To: Santi Dotson MD    Dear Dr. Banuelos,     Our mutual patient Frandy is currently on high dose insulin for his DM-2. You see him for ESTRADA and cirrhosis due to it. I was planning to start low dose metformin for him which could benefit him for diabetes and there has been some suggestion that it could be helpful in ESTRADA patients in terms of survival.     I wanted to check with you before we start him to see if you had any concerns for starting metformin.     Thanks  Yogesh Wilcox MD  [id number]  Endocrinology Service

## 2017-03-07 NOTE — TELEPHONE ENCOUNTER
Patient called and plan discussed.     Paperwork filled out by Dr Bowen.     Sent Metformin script to [facility]     Plan to start 500 mg daily but dose increase expected to 850 mg.     Patient to sent msg of BG levels in a week from Metformin start to adjust the dose.     Jennifer Wilcox MD  [address]24  Endocrinology Service

## 2017-03-09 ENCOUNTER — OFFICE VISIT (OUTPATIENT)
Dept: ENDOCRINOLOGY | Facility: CLINIC | Age: 52
End: 2017-03-09

## 2017-03-09 ENCOUNTER — OFFICE VISIT (OUTPATIENT)
Dept: INTERNAL MEDICINE | Facility: CLINIC | Age: 52
End: 2017-03-09

## 2017-03-09 VITALS
BODY MASS INDEX: 28.5 KG/M2 | OXYGEN SATURATION: 100 % | SYSTOLIC BLOOD PRESSURE: 134 MMHG | WEIGHT: 193 LBS | RESPIRATION RATE: 20 BRPM | HEART RATE: 77 BPM | DIASTOLIC BLOOD PRESSURE: 76 MMHG

## 2017-03-09 DIAGNOSIS — E11.9 TYPE 2 DIABETES MELLITUS WITHOUT COMPLICATION, UNSPECIFIED LONG TERM INSULIN USE STATUS: ICD-10-CM

## 2017-03-09 DIAGNOSIS — L60.1 ONYCHOLYSIS: Primary | ICD-10-CM

## 2017-03-09 DIAGNOSIS — R18.8 OTHER ASCITES: Primary | ICD-10-CM

## 2017-03-09 RX ORDER — SPIRONOLACTONE 25 MG/1
25 TABLET ORAL DAILY
Qty: 90 TABLET | Refills: 3 | Status: SHIPPED | OUTPATIENT
Start: 2017-03-09 | End: 2017-10-04

## 2017-03-09 ASSESSMENT — ENCOUNTER SYMPTOMS
CLAUDICATION: 0
ABDOMINAL PAIN: 0
TASTE DISTURBANCE: 0
CHILLS: 0
PALPITATIONS: 0
CONSTIPATION: 0
SHORTNESS OF BREATH: 0
DIARRHEA: 1
BLOATING: 0
DOUBLE VISION: 0
EYE PAIN: 0
FATIGUE: 0
SINUS CONGESTION: 0
BLOOD IN STOOL: 0
NIGHT SWEATS: 0
COUGH: 0
DYSPNEA ON EXERTION: 0

## 2017-03-09 ASSESSMENT — PAIN SCALES - GENERAL: PAINLEVEL: MODERATE PAIN (5)

## 2017-03-09 NOTE — MR AVS SNAPSHOT
After Visit Summary   3/9/2017    Frandy Workman    MRN: [id number]           Patient Information     Date Of Birth          1965        Visit Information        Provider Department      3/9/2017 1:00 PM Natalie Chun MD [facility]        Today's Diagnoses     Other ascites    -  1      Care Instructions    Primary Care Center Medication Refill Request Information:  * Please contact your pharmacy regarding ANY request for medication refills.  ** [facility] Prescription Fax = [phone]  * Please allow 3 business days for routine medication refills.  * Please allow 5 business days for controlled substance medication refills.     Primary Care Center Test Result notification information:  *You will be notified with in 7-10 days of your appointment day regarding the results of your test.  If you are on MyChart you will be notified as soon as the provider has reviewed the results and signed off on them.    Try eating a late evening meal instead of snacking. Work on getting more protein than carbohydrates. Otherwise follow-up as planned with endocrine for your diabetes.     If feeling confused, try taking an extra dose of lactulose.     Return to clinic in 3 months.         Follow-ups after your visit        Follow-up notes from your care team     Return in about 3 months (around 6/9/2017).      Your next 10 appointments already scheduled     Apr 17, 2017  1:00 PM CDT   (Arrive by 12:45 PM)   Return General Liver with Santi Dotosn MD   [facility] Hepatology ([facility])    [address]  [address] [facility] [address]   [phone]            Jun 09, 2017 12:30 PM CDT   (Arrive by 12:15 PM)   Return Visit with Natalie Russell MD   [facility] ([facility])    [address]  [address] [facility] [id number] [phone]            Jun 21, 2017 12:15 PM CDT    (Arrive by 12:00 PM)   RETURN ENDOCRINE with Jennifer Wilcox MD   [facility] Endocrinology ([facility])    [address]  [address] [facility] [address] [phone]            Sep 14, 2017 12:30 PM CDT   (Arrive by 12:15 PM)   Return Visit with Lamin Gonzalez DPM   [facility] Endocrinology ([facility])    [address]  [address] [facility] [address] [phone]              Who to contact     Please call your clinic at [phone] to:    Ask questions about your health    Make or cancel appointments    Discuss your medicines    Learn about your test results    Speak to your doctor   If you have compliments or concerns about an experience at your clinic, or if you wish to file a complaint, please contact [facility] Patient Relations at [phone] or email us at [email or web address]         Additional Information About Your Visit        [employer] Information     Baboomt gives you secure access to your electronic health record. If you see a primary care provider, you can also send messages to your care team and make appointments. If you have questions, please call your primary care clinic.  If you do not have a primary care provider, please call [phone] and they will assist you.      Shake is an electronic gateway that provides easy, online access to your medical records. With Shake, you can request a clinic appointment, read your test results, renew a prescription or communicate with your care team.     To access your existing account, please contact your [facility] or call [phone] for assistance.        Care EveryWhere ID     This is your Care EveryWhere ID. This could be used by other organizations to access your [place] medical records  [id number]        Your Vitals Were     Pulse Respirations Pulse Oximetry BMI (Body Mass Index)           77 20 100% 28.5 kg/m2         Blood Pressure from Last 3 Encounters:   03/09/17 134/76   02/20/17 115/75   12/08/16 117/73    Weight from Last 3 Encounters:   03/09/17 87.5 kg (193 lb)   02/20/17 87.5 kg (193 lb)   02/01/17 85.9 kg (189 lb 6.4 oz)              Today, you had the following     No orders found for display         Where to get your medicines      These medications were sent to [email or web address] [address] [address] & [address], [place] [address]     Phone:  [phone]     spironolactone 25 MG tablet          Primary Care Provider Office Phone # Fax #    Natalie Mitzi Russell [phone]       [address]  [facility] [address]        Thank you!     Thank you for choosing [facility]  for your care. Our goal is always to provide you with excellent care. Hearing back from our patients is one way we can continue to improve our services. Please take a few minutes to complete the written survey that you may receive in the mail after your visit with us. Thank you!             Your Updated Medication List - Protect others around you: Learn how to safely use, store and throw away your medicines at www.disposemymeds.org.          This list is accurate as of: 3/9/17  1:45 PM.  Always use your most recent med list.                   Brand Name Dispense Instructions for use    aspirin 81 MG tablet     90 tablet    Take 1 tablet (81 mg) by mouth daily       BD VIKTORIA U/F 32G X 4 MM   Generic drug:  insulin pen needle      U 6 D       blood glucose monitoring lancets     3 Box    Use to test blood sugar 4 times daily or as directed.  1 box = 102 lancets       blood glucose monitoring test strip    ACCU-CHEK EDINSON PLUS    400 each    Use to test blood sugar 4 times daily       carvedilol 12.5 MG tablet    COREG    180 tablet    Take 1 tablet (12.5 mg) by mouth 2 times daily (with meals)        insulin aspart 100 UNIT/ML injection    NovoLOG FLEXPEN    24 mL    1 unit per 4 Gms CHO at meals and snacks + correction scale of 1 unit per 25 mg/dL over 125. Average daily dose is 75 units.       insulin degludec 200 UNIT/ML pen    TRESIBA    18 mL    Take 110 units daily.       lactulose 10 GM/15ML solution    CHRONULAC     Take 60 mLs by mouth 4 times daily       metFORMIN 500 MG 24 hr tablet    GLUCOPHAGE-XR    30 tablet    Take 1 tablet (500 mg) by mouth daily (with dinner)       OLANZapine 2.5 MG tablet    zyPREXA    30 tablet    Take 1 tablet (2.5 mg) by mouth At Bedtime       omeprazole 40 MG capsule    priLOSEC     Take 40 mg by mouth daily       potassium chloride SA 20 MEQ CR tablet    K-DUR/KLOR-CON M     Take 40 mEq by mouth every morning       RA VITAMIN B-12 TR 1000 MCG Tbcr   Generic drug:  cyanocobalamin      Take 1,000 mcg by mouth daily       rifaximin 550 MG Tabs tablet    XIFAXAN    60 tablet    Take 1 tablet (550 mg) by mouth 2 times daily       sildenafil 50 MG cap/tab    REVATIO/VIAGRA    12 tablet    Take 0.5-1 tablets (25-50 mg) by mouth daily as needed for erectile dysfunction Take 30 min to 4 hours before intercourse       simvastatin 20 MG tablet    ZOCOR    90 tablet    Take 1 tablet (20 mg) by mouth At Bedtime       spironolactone 25 MG tablet    ALDACTONE    90 tablet    Take 1 tablet (25 mg) by mouth daily       tadalafil 5 MG tablet    CIALIS    20 tablet    Take 1 tablet (5 mg) by mouth as needed for erectile dysfunction       tamsulosin 0.4 MG capsule    FLOMAX     Take 0.4 mg by mouth daily       THERAVITE PO      Take 1 tablet by mouth daily

## 2017-03-09 NOTE — PATIENT INSTRUCTIONS
Primary Care Center Medication Refill Request Information:  * Please contact your pharmacy regarding ANY request for medication refills.  ** [facility] Prescription Fax = [phone]  * Please allow 3 business days for routine medication refills.  * Please allow 5 business days for controlled substance medication refills.     Primary Care Center Test Result notification information:  *You will be notified with in 7-10 days of your appointment day regarding the results of your test.  If you are on MyChart you will be notified as soon as the provider has reviewed the results and signed off on them.    Try eating a late evening meal instead of snacking. Work on getting more protein than carbohydrates. Otherwise follow-up as planned with endocrine for your diabetes.     If feeling confused, try taking an extra dose of lactulose.     Return to clinic in 3 months.

## 2017-03-09 NOTE — LETTER
3/9/2017       RE: Frandy Workman  [address]   [place] [facility] [place] [facility] [address]     Dear Colleague,    Thank you for referring your patient, Frandy Workman, to the [facility] ENDOCRINOLOGY at [facility]. Please see a copy of my visit note below.    Past Medical History   Diagnosis Date     BPH (benign prostatic hyperplasia)      Cholelithiasis      Hyperlipidemia      Liver cirrhosis secondary to ESTRADA (H)      Type II diabetes mellitus (H)      Patient Active Problem List   Diagnosis     Liver cirrhosis secondary to ESTRADA (H)     Type II diabetes mellitus (H)     Past Surgical History   Procedure Laterality Date     Knee surgery Left      Esophagoscopy, gastroscopy, duodenoscopy (egd), combined N/A 11/17/2016     Procedure: COMBINED ESOPHAGOSCOPY, GASTROSCOPY, DUODENOSCOPY (EGD);  Surgeon: Santi Rosas MD;  Location: [facility]     Social History     Social History     Marital status:      Spouse name: N/A     Number of children: N/A     Years of education: N/A     Occupational History     Not on file.     Social History Main Topics     Smoking status: Never Smoker     Smokeless tobacco: Current User      Comment: 1 tin per week     Alcohol use No      Comment: quit Sept. 1996     Drug use: No     Sexual activity: Not on file     Other Topics Concern     Not on file     Social History Narrative     Family History   Problem Relation Age of Onset     Prostate Cancer Maternal Grandfather      Colon Cancer Father 60     Pancreatic Cancer Father 60     Liver Disease No family hx of      Lab Results   Component Value Date    A1C 7.8 10/25/2016      Creatinine   Date Value Ref Range Status   12/19/2016 1.14 0.66 - 1.25 mg/dL Final   ]  Inr          1.27         12/19/2017  SUBJECTIVE FINDINGS:  A 52-year-old male presents from Natalie Bowen for toenail problems.  He relates that he has seen Dr. Bowen, who thought shoes were too tight.  He changed shoes.  He was wearing a pair  of Nikes that were narrow, and he does not think it has made any difference, though.  The hallus nail has started to turn color.  He relates no injuries.  It has stayed about the same.  The left one start in October, the right one probably started in November.  He is diabetic, relates he does not really get any neuropathy or numbness or tingling in his feet.  Occasionally he will get a sharp pain in his feet.  He uses lotion daily.  He is walking 3-6 miles every day, and he has continued to do that.        OBJECTIVE FINDINGS:  DP and PT are 2/4 bilaterally.  Sharp/dull is intact with 5.07 [place] monofilament bilaterally.  There is no erythema, no drainage, no odor, no calor bilaterally.  He has distally contracted digits 2-5 bilaterally.  He has a very mild dorsomedial first MPJ prominence bilaterally.  He has loose hallux nails bilaterally with subungual dried blood, right greater than left, and some subungual debris and discoloration mostly in the left nail.  There is no erythema, no drainage, no odor, no calor bilaterally.  No gross tendon voids.      ASSESSMENT AND PLAN:  Onycholysis of hallux nails bilaterally.  He is diabetic.  Diagnosis and treatment options were discussed with him.  Hallus nails were reduced bilaterally upon consent.  There are no underlying ulcerations.  His New Balance appeared to fit well, and he will continue those.  Diagnosis and treatment were discussed with him.  He will return to clinic and see me in about 6 months for diabetic foot check.       Again, thank you for allowing me to participate in the care of your patient.      Sincerely,    Lamin Gonzalez DPM

## 2017-03-09 NOTE — MR AVS SNAPSHOT
After Visit Summary   3/9/2017    Frandy Workman    MRN: [id number]           Patient Information     Date Of Birth          1965        Visit Information        Provider Department      3/9/2017 12:30 PM Lamin Gonzalez DPM [facility] Endocrinology        Today's Diagnoses     Onycholysis    -  1    Type 2 diabetes mellitus without complication, unspecified long term insulin use status (H)           Follow-ups after your visit        Your next 10 appointments already scheduled     Apr 17, 2017  1:00 PM CDT   (Arrive by 12:45 PM)   Return General Liver with Santi Dotson MD   [facility] Hepatology ([facility])    [address] [place] [address]  [address] [facility] [address]   [phone]            Jun 09, 2017 12:30 PM CDT   (Arrive by 12:15 PM)   Return Visit with Natalie Russell MD   [facility] ([facility])    [address] [place] [address]  [address] [facility] [id number] [phone]            Jun 21, 2017 12:15 PM CDT   (Arrive by 12:00 PM)   RETURN ENDOCRINE with Jennifer Wilcox MD   [facility] Endocrinology ([facility])    [address] [place] [address]  [address] [facility] [id number] [phone]            Sep 14, 2017 12:30 PM CDT   (Arrive by 12:15 PM)   Return Visit with Lamin Gonzalez DPM   [facility] Endocrinology ([facility])    [address] [place] [id number] [phone]              Who to contact     Please call your clinic at [phone] to:    Ask questions about your health    Make or cancel appointments    Discuss your medicines    Learn about your test results    Speak to your doctor   If you have compliments or concerns about an experience at your clinic, or if you wish to file a complaint, please contact [facility] Patient Relations at [phone] or email us at  [email or web address]         Additional Information About Your Visit        MyChart Information     [employer] gives you secure access to your electronic health record. If you see a primary care provider, you can also send messages to your care team and make appointments. If you have questions, please call your primary care clinic.  If you do not have a primary care provider, please call [phone] and they will assist you.      VitalFields is an electronic gateway that provides easy, online access to your medical records. With V[employer], you can request a clinic appointment, read your test results, renew a prescription or communicate with your care team.     To access your existing account, please contact your [facility] or call [phone] for assistance.        Care EveryWhere ID     This is your Care EveryWhere ID. This could be used by other organizations to access your [place] medical records  [id number]         Blood Pressure from Last 3 Encounters:   03/09/17 134/76   02/20/17 115/75   12/08/16 117/73    Weight from Last 3 Encounters:   03/09/17 87.5 kg (193 lb)   02/20/17 87.5 kg (193 lb)   02/01/17 85.9 kg (189 lb 6.4 oz)              We Performed the Following     TRIM NONDYSTRPHIC NAIL(S)          Where to get your medicines      These medications were sent to [facility] [id number] [place] [address] & [address], [place] [address]     Phone:  [phone]     spironolactone 25 MG tablet          Primary Care Provider Office Phone # Fax #    Natalie Russell [phone]       [address]  [facility] [address]        Thank you!     Thank you for choosing [facility] ENDOCRINOLOGY  for your care. Our goal is always to provide you with excellent care. Hearing back from our patients is one way we can continue to improve our services. Please take a few  minutes to complete the written survey that you may receive in the mail after your visit with us. Thank you!             Your Updated Medication List - Protect others around you: Learn how to safely use, store and throw away your medicines at www.disposemymeds.org.          This list is accurate as of: 3/9/17 11:59 PM.  Always use your most recent med list.                   Brand Name Dispense Instructions for use    aspirin 81 MG tablet     90 tablet    Take 1 tablet (81 mg) by mouth daily       BD VIKTORIA U/F 32G X 4 MM   Generic drug:  insulin pen needle      U 6 D       blood glucose monitoring lancets     3 Box    Use to test blood sugar 4 times daily or as directed.  1 box = 102 lancets       blood glucose monitoring test strip    ACCU-CHEK EDINSON PLUS    400 each    Use to test blood sugar 4 times daily       carvedilol 12.5 MG tablet    COREG    180 tablet    Take 1 tablet (12.5 mg) by mouth 2 times daily (with meals)       insulin aspart 100 UNIT/ML injection    NovoLOG FLEXPEN    24 mL    1 unit per 4 Gms CHO at meals and snacks + correction scale of 1 unit per 25 mg/dL over 125. Average daily dose is 75 units.       insulin degludec 200 UNIT/ML pen    TRESIBA    18 mL    Take 110 units daily.       lactulose 10 GM/15ML solution    CHRONULAC     Take 60 mLs by mouth 4 times daily       metFORMIN 500 MG 24 hr tablet    GLUCOPHAGE-XR    30 tablet    Take 1 tablet (500 mg) by mouth daily (with dinner)       OLANZapine 2.5 MG tablet    zyPREXA    30 tablet    Take 1 tablet (2.5 mg) by mouth At Bedtime       omeprazole 40 MG capsule    priLOSEC     Take 40 mg by mouth daily       potassium chloride SA 20 MEQ CR tablet    K-DUR/KLOR-CON M     Take 40 mEq by mouth every morning       RA VITAMIN B-12 TR 1000 MCG Tbcr   Generic drug:  cyanocobalamin      Take 1,000 mcg by mouth daily       rifaximin 550 MG Tabs tablet    XIFAXAN    60 tablet    Take 1 tablet (550 mg) by mouth 2 times daily       sildenafil 50 MG  cap/tab    REVATIO/VIAGRA    12 tablet    Take 0.5-1 tablets (25-50 mg) by mouth daily as needed for erectile dysfunction Take 30 min to 4 hours before intercourse       simvastatin 20 MG tablet    ZOCOR    90 tablet    Take 1 tablet (20 mg) by mouth At Bedtime       spironolactone 25 MG tablet    ALDACTONE    90 tablet    Take 1 tablet (25 mg) by mouth daily       tadalafil 5 MG tablet    CIALIS    20 tablet    Take 1 tablet (5 mg) by mouth as needed for erectile dysfunction       tamsulosin 0.4 MG capsule    FLOMAX     Take 0.4 mg by mouth daily       THERAVITE PO      Take 1 tablet by mouth daily

## 2017-03-09 NOTE — NURSING NOTE
"Chief Complaint   Patient presents with     Diabetes     \" My sugars having been running real high.\"   Adilene Soares LPN 12:52 PM on 3/9/2017  "

## 2017-03-15 ENCOUNTER — MYC MEDICAL ADVICE (OUTPATIENT)
Dept: ENDOCRINOLOGY | Facility: CLINIC | Age: 52
End: 2017-03-15

## 2017-03-22 DIAGNOSIS — N40.1 BENIGN PROSTATIC HYPERPLASIA WITH LOWER URINARY TRACT SYMPTOMS, UNSPECIFIED MORPHOLOGY: Primary | ICD-10-CM

## 2017-03-22 DIAGNOSIS — K21.9 GASTROESOPHAGEAL REFLUX DISEASE, ESOPHAGITIS PRESENCE NOT SPECIFIED: ICD-10-CM

## 2017-03-26 RX ORDER — OMEPRAZOLE 40 MG/1
40 CAPSULE, DELAYED RELEASE ORAL DAILY
Qty: 90 CAPSULE | Refills: 1 | Status: SHIPPED | OUTPATIENT
Start: 2017-03-26 | End: 2017-09-19

## 2017-03-26 RX ORDER — TAMSULOSIN HYDROCHLORIDE 0.4 MG/1
0.4 CAPSULE ORAL DAILY
Qty: 90 CAPSULE | Refills: 1 | Status: SHIPPED | OUTPATIENT
Start: 2017-03-26 | End: 2017-09-19

## 2017-03-26 NOTE — TELEPHONE ENCOUNTER
tamsulosin AND omeprazole    Last Written Prescription Date:  UNK  HISTORICAL  Last Fill Quantity: UNK,   # refills: UNK  Last Office Visit : 3/9/17  Future Office visit:  6/9/17  Routing refill request to provider for review/approval because:  Medication is reported/historical

## 2017-03-31 ENCOUNTER — TELEPHONE (OUTPATIENT)
Dept: GASTROENTEROLOGY | Facility: CLINIC | Age: 52
End: 2017-03-31

## 2017-03-31 ENCOUNTER — DOCUMENTATION ONLY (OUTPATIENT)
Dept: OTHER | Facility: CLINIC | Age: 52
End: 2017-03-31

## 2017-03-31 DIAGNOSIS — E11.65 TYPE 2 DIABETES MELLITUS WITH HYPERGLYCEMIA, WITH LONG-TERM CURRENT USE OF INSULIN (H): ICD-10-CM

## 2017-03-31 DIAGNOSIS — K74.60 LIVER CIRRHOSIS SECONDARY TO NASH (H): Primary | ICD-10-CM

## 2017-03-31 DIAGNOSIS — K75.81 LIVER CIRRHOSIS SECONDARY TO NASH (H): Primary | ICD-10-CM

## 2017-03-31 DIAGNOSIS — Z79.4 TYPE 2 DIABETES MELLITUS WITH HYPERGLYCEMIA, WITH LONG-TERM CURRENT USE OF INSULIN (H): ICD-10-CM

## 2017-03-31 DIAGNOSIS — Z71.89 ADVANCED DIRECTIVES, COUNSELING/DISCUSSION: Chronic | ICD-10-CM

## 2017-03-31 RX ORDER — METFORMIN HCL 500 MG
1000 TABLET, EXTENDED RELEASE 24 HR ORAL
Qty: 60 TABLET | Refills: 3 | Status: SHIPPED | OUTPATIENT
Start: 2017-03-31 | End: 2017-06-21

## 2017-03-31 NOTE — TELEPHONE ENCOUNTER
Writer called to see if [employer] patient assistance application went through. Pt confirmed it did and rather than paying close to $2000 a month on the copay he now pays $65 a month. Patient asked about labs for upcoming appointment with Dr. Banuelos so writer set up patient for lab appointment Monday 4/3/17 at 1000. No further actions needed.

## 2017-04-03 ENCOUNTER — ALLIED HEALTH/NURSE VISIT (OUTPATIENT)
Dept: INTERNAL MEDICINE | Facility: CLINIC | Age: 52
End: 2017-04-03

## 2017-04-03 VITALS
WEIGHT: 197.2 LBS | BODY MASS INDEX: 29.12 KG/M2 | DIASTOLIC BLOOD PRESSURE: 68 MMHG | HEART RATE: 74 BPM | SYSTOLIC BLOOD PRESSURE: 122 MMHG

## 2017-04-03 DIAGNOSIS — K75.81 LIVER CIRRHOSIS SECONDARY TO NASH (H): ICD-10-CM

## 2017-04-03 DIAGNOSIS — K74.60 LIVER CIRRHOSIS SECONDARY TO NASH (H): ICD-10-CM

## 2017-04-03 DIAGNOSIS — H61.21 IMPACTED CERUMEN OF RIGHT EAR: Primary | ICD-10-CM

## 2017-04-03 LAB
ALBUMIN SERPL-MCNC: 3.2 G/DL (ref 3.4–5)
ALP SERPL-CCNC: 164 U/L (ref 40–150)
ALT SERPL W P-5'-P-CCNC: 54 U/L (ref 0–70)
ANION GAP SERPL CALCULATED.3IONS-SCNC: 8 MMOL/L (ref 3–14)
AST SERPL W P-5'-P-CCNC: 55 U/L (ref 0–45)
BILIRUB DIRECT SERPL-MCNC: 0.4 MG/DL (ref 0–0.2)
BILIRUB SERPL-MCNC: 1.3 MG/DL (ref 0.2–1.3)
BUN SERPL-MCNC: 24 MG/DL (ref 7–30)
CALCIUM SERPL-MCNC: 9.5 MG/DL (ref 8.5–10.1)
CHLORIDE SERPL-SCNC: 109 MMOL/L (ref 94–109)
CO2 SERPL-SCNC: 25 MMOL/L (ref 20–32)
CREAT SERPL-MCNC: 1.06 MG/DL (ref 0.66–1.25)
ERYTHROCYTE [DISTWIDTH] IN BLOOD BY AUTOMATED COUNT: 13.1 % (ref 10–15)
GFR SERPL CREATININE-BSD FRML MDRD: 73 ML/MIN/1.7M2
GLUCOSE SERPL-MCNC: 276 MG/DL (ref 70–99)
HCT VFR BLD AUTO: 35.4 % (ref 40–53)
HGB BLD-MCNC: 12.2 G/DL (ref 13.3–17.7)
INR PPP: 1.33 (ref 0.86–1.14)
MCH RBC QN AUTO: 37.4 PG (ref 26.5–33)
MCHC RBC AUTO-ENTMCNC: 34.5 G/DL (ref 31.5–36.5)
MCV RBC AUTO: 109 FL (ref 78–100)
PLATELET # BLD AUTO: 45 10E9/L (ref 150–450)
POTASSIUM SERPL-SCNC: 5.3 MMOL/L (ref 3.4–5.3)
PROT SERPL-MCNC: 7.2 G/DL (ref 6.8–8.8)
RBC # BLD AUTO: 3.26 10E12/L (ref 4.4–5.9)
SODIUM SERPL-SCNC: 141 MMOL/L (ref 133–144)
WBC # BLD AUTO: 3.3 10E9/L (ref 4–11)

## 2017-04-03 NOTE — MR AVS SNAPSHOT
After Visit Summary   4/3/2017    Frandy Workman    MRN: [id number]           Patient Information     Date Of Birth          1965        Visit Information        Provider Department      4/3/2017 1:00 PM Nurse, [facility]        Today's Diagnoses     Impacted cerumen of right ear    -  1       Follow-ups after your visit        Your next 10 appointments already scheduled     Apr 03, 2017  1:00 PM CDT   Nurse Visit with [facility] Nurse   [facility] ([facility])    [address] [place] [id number]   [phone]            Apr 17, 2017  1:00 PM CDT   (Arrive by 12:45 PM)   Return General Liver with Santi Dotson MD   [facility] Hepatology ([facility])    [address] [place] [id number]   [phone]            Jun 09, 2017 12:30 PM CDT   (Arrive by 12:15 PM)   Return Visit with Natalie Russell MD   [facility] ([facility])    [address] [place] [id number] [phone]            Jun 21, 2017 12:15 PM CDT   (Arrive by 12:00 PM)   RETURN ENDOCRINE with Jennifer Wilcox MD   [facility] Endocrinology ([facility])    [address] [place] [id number] [phone]            Sep 14, 2017 12:30 PM CDT   (Arrive by 12:15 PM)   Return Visit with Lamin Gonzalez DPM   [facility] Endocrinology ([facility])    [address] [place] [id number] [phone]              Who to contact     Please call your clinic at [phone] to:    Ask questions about your health    Make or cancel appointments    Discuss your medicines    Learn about your test results    Speak to your doctor   If you have compliments or concerns about an experience at your clinic, or if you wish to  file a complaint, please contact [facility] Patient Relations at [phone] or email us at [email or web address]         Additional Information About Your Visit        [employer] Information     [employer] gives you secure access to your electronic health record. If you see a primary care provider, you can also send messages to your care team and make appointments. If you have questions, please call your primary care clinic.  If you do not have a primary care provider, please call [phone] and they will assist you.      [employer] is an electronic gateway that provides easy, online access to your medical records. With [employer], you can request a clinic appointment, read your test results, renew a prescription or communicate with your care team.     To access your existing account, please contact your [facility] or call [phone] for assistance.        Care EveryWhere ID     This is your Care EveryWhere ID. This could be used by other organizations to access your [place] medical records  [id number]        Your Vitals Were     Pulse BMI (Body Mass Index)                74 29.12 kg/m2           Blood Pressure from Last 3 Encounters:   04/03/17 122/68   03/09/17 134/76   02/20/17 115/75    Weight from Last 3 Encounters:   04/03/17 89.4 kg (197 lb 3.2 oz)   03/09/17 87.5 kg (193 lb)   02/20/17 87.5 kg (193 lb)              Today, you had the following     No orders found for display       Primary Care Provider Office Phone # Fax #    Natalie Mitzi Russell [phone]       [place] [address]  [facility] [address]        Thank you!     Thank you for choosing [facility]  for your care. Our goal is always to provide you with excellent care. Hearing back from our patients is one way we can continue to improve our services. Please take a few minutes to complete the written survey that you may  receive in the mail after your visit with us. Thank you!             Your Updated Medication List - Protect others around you: Learn how to safely use, store and throw away your medicines at www.disposemymeds.org.          This list is accurate as of: 4/3/17 11:26 AM.  Always use your most recent med list.                   Brand Name Dispense Instructions for use    aspirin 81 MG tablet     90 tablet    Take 1 tablet (81 mg) by mouth daily       BD VIKTORIA U/F 32G X 4 MM   Generic drug:  insulin pen needle      U 6 D       blood glucose monitoring lancets     3 Box    Use to test blood sugar 4 times daily or as directed.  1 box = 102 lancets       blood glucose monitoring test strip    ACCU-CHEK EDINSON PLUS    400 each    Use to test blood sugar 4 times daily       carvedilol 12.5 MG tablet    COREG    180 tablet    Take 1 tablet (12.5 mg) by mouth 2 times daily (with meals)       insulin aspart 100 UNIT/ML injection    NovoLOG FLEXPEN    24 mL    1 unit per 4 Gms CHO at meals and snacks + correction scale of 1 unit per 25 mg/dL over 125. Average daily dose is 75 units.       insulin degludec 200 UNIT/ML pen    TRESIBA    21 mL    Take 120 units daily.       lactulose 10 GM/15ML solution    CHRONULAC     Take 60 mLs by mouth 4 times daily       metFORMIN 500 MG 24 hr tablet    GLUCOPHAGE-XR    60 tablet    Take 2 tablets (1,000 mg) by mouth daily (with dinner)       OLANZapine 2.5 MG tablet    zyPREXA    30 tablet    Take 1 tablet (2.5 mg) by mouth At Bedtime       omeprazole 40 MG capsule    priLOSEC    90 capsule    Take 1 capsule (40 mg) by mouth daily       potassium chloride SA 20 MEQ CR tablet    K-DUR/KLOR-CON M     Take 40 mEq by mouth every morning       RA VITAMIN B-12 TR 1000 MCG Tbcr   Generic drug:  cyanocobalamin      Take 1,000 mcg by mouth daily       rifaximin 550 MG Tabs tablet    XIFAXAN    60 tablet    Take 1 tablet (550 mg) by mouth 2 times daily       sildenafil 50 MG cap/tab    REVATIO/VIAGRA     12 tablet    Take 0.5-1 tablets (25-50 mg) by mouth daily as needed for erectile dysfunction Take 30 min to 4 hours before intercourse       simvastatin 20 MG tablet    ZOCOR    90 tablet    Take 1 tablet (20 mg) by mouth At Bedtime       spironolactone 25 MG tablet    ALDACTONE    90 tablet    Take 1 tablet (25 mg) by mouth daily       tadalafil 5 MG tablet    CIALIS    20 tablet    Take 1 tablet (5 mg) by mouth as needed for erectile dysfunction       tamsulosin 0.4 MG capsule    FLOMAX    90 capsule    Take 1 capsule (0.4 mg) by mouth daily       THERAVITE PO      Take 1 tablet by mouth daily

## 2017-04-03 NOTE — NURSING NOTE
Chief Complaint   Patient presents with     Cerumen (impacted)     Patient here for ear wash.       Artur Willett CMA at 11:19 AM on 4/3/2017.    Irrigated right ear with luke warm water. Obtained moderate amount of brown cerumen from right ear irrigation. Patient tolerated the procedure well.      Artur Willett CMA at 11:24 AM on 4/3/2017

## 2017-04-05 DIAGNOSIS — E87.6 HYPOKALEMIA: Primary | ICD-10-CM

## 2017-04-06 RX ORDER — POTASSIUM CHLORIDE 1500 MG/1
20 TABLET, EXTENDED RELEASE ORAL DAILY
Qty: 90 TABLET | Refills: 0 | Status: SHIPPED | OUTPATIENT
Start: 2017-04-06 | End: 2017-06-21

## 2017-04-06 NOTE — TELEPHONE ENCOUNTER
POTASSIUM CL 20MEQ       Last Written Prescription Date:  3/14/16  Last Fill Quantity: UNK,   # refills: UNK  Last Office Visit : 3/9/17  Future Office visit:  6/9/17  Potassium   Date Value Ref Range Status   04/03/2017 5.3 3.4 - 5.3 mmol/L Final   Routing refill request to provider for review/approval because:  Medication is reported/historical

## 2017-04-17 ENCOUNTER — OFFICE VISIT (OUTPATIENT)
Dept: GASTROENTEROLOGY | Facility: CLINIC | Age: 52
End: 2017-04-17
Attending: INTERNAL MEDICINE
Payer: MEDICARE

## 2017-04-17 VITALS
WEIGHT: 197.2 LBS | HEIGHT: 69 IN | SYSTOLIC BLOOD PRESSURE: 105 MMHG | DIASTOLIC BLOOD PRESSURE: 69 MMHG | BODY MASS INDEX: 29.21 KG/M2 | OXYGEN SATURATION: 100 % | HEART RATE: 74 BPM

## 2017-04-17 DIAGNOSIS — K75.81 LIVER CIRRHOSIS SECONDARY TO NASH (H): Primary | ICD-10-CM

## 2017-04-17 DIAGNOSIS — K74.60 LIVER CIRRHOSIS SECONDARY TO NASH (H): Primary | ICD-10-CM

## 2017-04-17 DIAGNOSIS — K76.82 HEPATIC ENCEPHALOPATHY (H): ICD-10-CM

## 2017-04-17 PROCEDURE — 99212 OFFICE O/P EST SF 10 MIN: CPT | Mod: ZF

## 2017-04-17 ASSESSMENT — PAIN SCALES - GENERAL: PAINLEVEL: MODERATE PAIN (5)

## 2017-04-17 NOTE — PROGRESS NOTES
[facility]    Hepatology follow-up    Follow-up visit for cirrhosis    Subjective:  52 year old male    Cirrhosis  - dx 2013  - ESTRADA, A1-AT phenotype- PiMZ  - hx HE  - hx ascites  - no hx variceal bleed  - last EGD Nov 2016- gd I EV, portal hypertensive gastropathy  - HCC screening- Abd U/S Dec 2016    Patient presents to clinic this afternoon for follow-up of cirrhosis.  Last clinic visit was Oct 2016.  Since then, he has established care with a PCP and endocrinology.  He was started on metformin to improve his blood sugars.  He underwent EGD in November which showed small EV.  No other new medications, ER visits or hospital admissions since last clinic visit.    Patient is doing well.  He did have a short period of mild confusion but this has since resolved after his PCP increased his lactulose dose frequency.  It is not certain if this period coincided with the 10 days when he was off rifaximin (due to insurance coverage issues).    Patient denies jaundice, lower extremity edema, abdominal distension or lethargy.    Patient denies melena, hematemesis or hematochezia.    Patient denies fevers, sweats or chills.  Weight stable.    Patient does not drink alcohol.  He is currently planning to obtain custody of his daughter.  She is currently living with his brother and sister-in-law with whom his relationship is very strained.      Medical hx Surgical hx   Past Medical History:   Diagnosis Date     BPH (benign prostatic hyperplasia)      Cholelithiasis      Hyperlipidemia      Liver cirrhosis secondary to ESTRADA (H)      Type II diabetes mellitus (H)       Past Surgical History:   Procedure Laterality Date     ESOPHAGOSCOPY, GASTROSCOPY, DUODENOSCOPY (EGD), COMBINED N/A 11/17/2016    Procedure: COMBINED ESOPHAGOSCOPY, GASTROSCOPY, DUODENOSCOPY (EGD);  Surgeon: Santi Rosas MD;  Location:  GI     KNEE SURGERY Left           Medications  Prior to Admission medications     Medication Sig Start Date End Date Taking? Authorizing Provider   potassium chloride SA (K-DUR/KLOR-CON M) 20 MEQ CR tablet Take 1 tablet (20 mEq) by mouth daily 4/6/17   Natalie Chun MD   metFORMIN (GLUCOPHAGE-XR) 500 MG 24 hr tablet Take 2 tablets (1,000 mg) by mouth daily (with dinner) 3/31/17   Cecilia Bustamante MD   insulin degludec (TRESIBA) 200 UNIT/ML pen Take 120 units daily. 3/31/17   Cecilia Bustamante MD   tamsulosin (FLOMAX) 0.4 MG capsule Take 1 capsule (0.4 mg) by mouth daily 3/26/17   Natalie Chun MD   omeprazole (PRILOSEC) 40 MG capsule Take 1 capsule (40 mg) by mouth daily 3/26/17   Natalie Chun MD   spironolactone (ALDACTONE) 25 MG tablet Take 1 tablet (25 mg) by mouth daily 3/9/17   Natalie Chun MD   rifaximin (XIFAXAN) 550 MG TABS tablet Take 1 tablet (550 mg) by mouth 2 times daily 1/26/17   Santi Rosas MD   insulin aspart (NOVOLOG FLEXPEN) 100 UNIT/ML injection 1 unit per 4 Gms CHO at meals and snacks + correction scale of 1 unit per 25 mg/dL over 125. Average daily dose is 75 units. 1/16/17   Natalie Chun MD   OLANZapine (ZYPREXA) 2.5 MG tablet Take 1 tablet (2.5 mg) by mouth At Bedtime 12/28/16   Natalie Chun MD   blood glucose monitoring (ACCU-CHEK EDINSON PLUS) test strip Use to test blood sugar 4 times daily 12/20/16   Natalie Chun MD   blood glucose monitoring (ACCU-CHEK FASTCLIX) lancets Use to test blood sugar 4 times daily or as directed.  1 box = 102 lancets 12/20/16   Natalie Chun MD   tadalafil (CIALIS) 5 MG tablet Take 1 tablet (5 mg) by mouth as needed for erectile dysfunction 11/4/16   Natalie Chun MD   carvedilol (COREG) 12.5 MG tablet Take 1 tablet (12.5 mg) by mouth 2 times daily (with meals) 10/25/16   Natalie Chun MD   simvastatin (ZOCOR) 20 MG tablet Take 1 tablet (20 mg) by mouth At Bedtime  "10/25/16   Natalie Chun MD   sildenafil (VIAGRA) 50 MG tablet Take 0.5-1 tablets (25-50 mg) by mouth daily as needed for erectile dysfunction Take 30 min to 4 hours before intercourse 10/25/16   Natalie Chun MD   aspirin 81 MG tablet Take 1 tablet (81 mg) by mouth daily 10/25/16   Natalie Chun MD   cyanocobalamin (RA VITAMIN B-12 TR) 1000 MCG TBCR Take 1,000 mcg by mouth daily 3/14/16   Reported, Patient   lactulose (CHRONULAC) 10 GM/15ML solution Take 60 mLs by mouth 4 times daily 8/9/16   Reported, Patient   insulin pen needle (BD VIKTORIA U/F) 32G X 4 MM U 6 D 5/2/16   Reported, Patient   Multiple Vitamin (THERAVITE PO) Take 1 tablet by mouth daily 3/14/16   Reported, Patient       Allergies  No Known Allergies    Review of systems  A 10-point review of systems was negative    Examination  /69  Pulse 74  Ht 1.753 m (5' 9\")  Wt 89.4 kg (197 lb 3.2 oz)  SpO2 100%  BMI 29.12 kg/m2  Body mass index is 29.12 kg/(m^2).    Gen- well, NAD, A+Ox3, normal color  Lym- no palpable LAD  CVS- RRR  RS- CTA  Abd- central obesity, soft, non-tender, no obvious ascites or organomegaly on palpation or percussion, BS+  Extr-hands normal, no LEILA  Skin- no rash or jaundice  Neuro- mild asterixis  Psych- normal mood    Laboratory  Lab Results   Component Value Date     04/03/2017    POTASSIUM 5.3 04/03/2017    CHLORIDE 109 04/03/2017    CO2 25 04/03/2017    BUN 24 04/03/2017    CR 1.06 04/03/2017       Lab Results   Component Value Date    BILITOTAL 1.3 04/03/2017    ALT 54 04/03/2017    AST 55 04/03/2017    ALKPHOS 164 04/03/2017       Lab Results   Component Value Date    ALBUMIN 3.2 04/03/2017    PROTTOTAL 7.2 04/03/2017        Lab Results   Component Value Date    WBC 3.3 04/03/2017    HGB 12.2 04/03/2017     04/03/2017    PLT 45 04/03/2017       Lab Results   Component Value Date    INR 1.33 04/03/2017       Radiology  Abd U/S Dec 2016 reviewed    Assessment  52 " year old male who presents for follow-up of decompensated ESTRADA cirrhosis complicated with hepatic encephalopathy and prior ascites.  MELD-Na= 11 (slightly increased since last visit).  No evidence of ascites on most recent abdominal ultrasound.  Hepatic encephalopathy controlled on current medical regimen.  We discussed starting liver transplant evaluation if patient's symptoms worsen or if his MELD score continues to increase:  the patient is still listed (and inactive) at [place].  Living donor liver transplantation has been considered in the past but the patient does not have any potential donors.  Up to date with surveillance of esophageal varices.  Up to date with HCC screening.  The patient is interested in participating in any upcoming clinical trials for ESTRADA or cirrhosis.    Plan  1.  Weight loss with diet and exercise  2.  Low Na diet  3.  Optimization of glycemic control per endocrine  4.  Continue lactulose and rifaximin  5.  Continue spironolactone at current dose  6.  Abdominal U/S in June 2017  7.  Follow-up in 6 months    Santi Banuelos MD  Hepatology  [facility]

## 2017-04-17 NOTE — LETTER
4/17/2017      RE: Frandy Workman  [address]   [facility] [address]       [facility]    Hepatology follow-up    Follow-up visit for cirrhosis    Subjective:  52 year old male    Cirrhosis  - dx 2013  - ESTRADA, A1-AT phenotype- PiMZ  - hx HE  - hx ascites  - no hx variceal bleed  - last EGD Nov 2016- gd I EV, portal hypertensive gastropathy  - HCC screening- Abd U/S Dec 2016    Patient presents to clinic this afternoon for follow-up of cirrhosis.  Last clinic visit was Oct 2016.  Since then, he has established care with a PCP and endocrinology.  He was started on metformin to improve his blood sugars.  He underwent EGD in November which showed small EV.  No other new medications, ER visits or hospital admissions since last clinic visit.    Patient is doing well.  He did have a short period of mild confusion but this has since resolved after his PCP increased his lactulose dose frequency.  It is not certain if this period coincided with the 10 days when he was off rifaximin (due to insurance coverage issues).    Patient denies jaundice, lower extremity edema, abdominal distension or lethargy.    Patient denies melena, hematemesis or hematochezia.    Patient denies fevers, sweats or chills.  Weight stable.    Patient does not drink alcohol.  He is currently planning to obtain custody of his daughter.  She is currently living with his brother and sister-in-law with whom his relationship is very strained.      Medical hx Surgical hx   Past Medical History:   Diagnosis Date     BPH (benign prostatic hyperplasia)      Cholelithiasis      Hyperlipidemia      Liver cirrhosis secondary to ESTRADA (H)      Type II diabetes mellitus (H)       Past Surgical History:   Procedure Laterality Date     ESOPHAGOSCOPY, GASTROSCOPY, DUODENOSCOPY (EGD), COMBINED N/A 11/17/2016    Procedure: COMBINED ESOPHAGOSCOPY, GASTROSCOPY, DUODENOSCOPY (EGD);  Surgeon: Santi Rosas MD;  Location: [facility]      KNEE SURGERY Left           Medications  Prior to Admission medications    Medication Sig Start Date End Date Taking? Authorizing Provider   potassium chloride SA (K-DUR/KLOR-CON M) 20 MEQ CR tablet Take 1 tablet (20 mEq) by mouth daily 4/6/17   Natalie Chun MD   metFORMIN (GLUCOPHAGE-XR) 500 MG 24 hr tablet Take 2 tablets (1,000 mg) by mouth daily (with dinner) 3/31/17   Cecilia Bustamante MD   insulin degludec (TRESIBA) 200 UNIT/ML pen Take 120 units daily. 3/31/17   Cecilia Bustamante MD   tamsulosin (FLOMAX) 0.4 MG capsule Take 1 capsule (0.4 mg) by mouth daily 3/26/17   Natalie Chun MD   omeprazole (PRILOSEC) 40 MG capsule Take 1 capsule (40 mg) by mouth daily 3/26/17   Natalie Chun MD   spironolactone (ALDACTONE) 25 MG tablet Take 1 tablet (25 mg) by mouth daily 3/9/17   Natalie Chun MD   rifaximin (XIFAXAN) 550 MG TABS tablet Take 1 tablet (550 mg) by mouth 2 times daily 1/26/17   Santi Rosas MD   insulin aspart (NOVOLOG FLEXPEN) 100 UNIT/ML injection 1 unit per 4 Gms CHO at meals and snacks + correction scale of 1 unit per 25 mg/dL over 125. Average daily dose is 75 units. 1/16/17   Natalie Chun MD   OLANZapine (ZYPREXA) 2.5 MG tablet Take 1 tablet (2.5 mg) by mouth At Bedtime 12/28/16   Natalie Chun MD   blood glucose monitoring (ACCU-CHEK EDINSON PLUS) test strip Use to test blood sugar 4 times daily 12/20/16   Natalie Chun MD   blood glucose monitoring (ACCU-CHEK FASTCLIX) lancets Use to test blood sugar 4 times daily or as directed.  1 box = 102 lancets 12/20/16   Natalie Chun MD   tadalafil (CIALIS) 5 MG tablet Take 1 tablet (5 mg) by mouth as needed for erectile dysfunction 11/4/16   Natalie Chun MD   carvedilol (COREG) 12.5 MG tablet Take 1 tablet (12.5 mg) by mouth 2 times daily (with meals) 10/25/16   Natalie Chun MD  "  simvastatin (ZOCOR) 20 MG tablet Take 1 tablet (20 mg) by mouth At Bedtime 10/25/16   Natalie Chun MD   sildenafil (VIAGRA) 50 MG tablet Take 0.5-1 tablets (25-50 mg) by mouth daily as needed for erectile dysfunction Take 30 min to 4 hours before intercourse 10/25/16   Natalie Chun MD   aspirin 81 MG tablet Take 1 tablet (81 mg) by mouth daily 10/25/16   Natalie Chun MD   cyanocobalamin (RA VITAMIN B-12 TR) 1000 MCG TBCR Take 1,000 mcg by mouth daily 3/14/16   Reported, Patient   lactulose (CHRONULAC) 10 GM/15ML solution Take 60 mLs by mouth 4 times daily 8/9/16   Reported, Patient   insulin pen needle (BD VIKTORIA U/F) 32G X 4 MM U 6 D 5/2/16   Reported, Patient   Multiple Vitamin (THERAVITE PO) Take 1 tablet by mouth daily 3/14/16   Reported, Patient       Allergies  No Known Allergies    Review of systems  A 10-point review of systems was negative    Examination  /69  Pulse 74  Ht 1.753 m (5' 9\")  Wt 89.4 kg (197 lb 3.2 oz)  SpO2 100%  BMI 29.12 kg/m2  Body mass index is 29.12 kg/(m^2).    Gen- well, NAD, A+Ox3, normal color  Lym- no palpable LAD  CVS- RRR  RS- CTA  Abd- central obesity, soft, non-tender, no obvious ascites or organomegaly on palpation or percussion, BS+  Extr-hands normal, no LEILA  Skin- no rash or jaundice  Neuro- mild asterixis  Psych- normal mood    Laboratory  Lab Results   Component Value Date     04/03/2017    POTASSIUM 5.3 04/03/2017    CHLORIDE 109 04/03/2017    CO2 25 04/03/2017    BUN 24 04/03/2017    CR 1.06 04/03/2017       Lab Results   Component Value Date    BILITOTAL 1.3 04/03/2017    ALT 54 04/03/2017    AST 55 04/03/2017    ALKPHOS 164 04/03/2017       Lab Results   Component Value Date    ALBUMIN 3.2 04/03/2017    PROTTOTAL 7.2 04/03/2017        Lab Results   Component Value Date    WBC 3.3 04/03/2017    HGB 12.2 04/03/2017     04/03/2017    PLT 45 04/03/2017       Lab Results   Component Value Date    INR " 1.33 04/03/2017       Radiology  Abd U/S Dec 2016 reviewed    Assessment  52 year old male who presents for follow-up of decompensated ESTRADA cirrhosis complicated with hepatic encephalopathy and prior ascites.  MELD-Na= 11 (slightly increased since last visit).  No evidence of ascites on most recent abdominal ultrasound.  Hepatic encephalopathy controlled on current medical regimen.  We discussed starting liver transplant evaluation if patient's symptoms worsen or if his MELD score continues to increase:  the patient is still listed (and inactive) at [place].  Living donor liver transplantation has been considered in the past but the patient does not have any potential donors.  Up to date with surveillance of esophageal varices.  Up to date with HCC screening.  The patient is interested in participating in any upcoming clinical trials for ESTRADA or cirrhosis.    Plan  1.  Weight loss with diet and exercise  2.  Low Na diet  3.  Optimization of glycemic control per endocrine  4.  Continue lactulose and rifaximin  5.  Continue spironolactone at current dose  6.  Abdominal U/S in June 2017  7.  Follow-up in 6 months    Santi Banuelos MD  Hepatology  [facility]

## 2017-04-17 NOTE — MR AVS SNAPSHOT
After Visit Summary   4/17/2017    Frandy Workman    MRN: [id number]           Patient Information     Date Of Birth          1965        Visit Information        Provider Department      4/17/2017 1:00 PM Santi Rosas MD [facility] Hepatology        Today's Diagnoses     Liver cirrhosis secondary to ESTRADA (H)    -  1       Follow-ups after your visit        Follow-up notes from your care team     Return in about 6 months (around 10/17/2017).      Your next 10 appointments already scheduled     Jun 09, 2017 12:30 PM CDT   (Arrive by 12:15 PM)   Return Visit with Natalie Russell MD   [facility] ([facility])    [address]  [address] [facility] [address] [phone]            Jun 12, 2017 10:45 AM CDT   US ABDOMEN COMPLETE with UCUS1   [facility] Imaging Center US ([facility])    [address] [place] [address]  [address] [facility] [address] [phone]           Please bring a list of your medicines (including vitamins, minerals and over-the-counter drugs). Also, tell your doctor about any allergies you may have. Wear comfortable clothes and leave your valuables at home.  Adults: No eating or drinking for 8 hours before the exam. You may take medicine with a small sip of water.  Children: - Children 6+ years: No food or drink for 6 hours before exam. - Children 1-5 years: No food or drink for 4 hours before exam. - Infants, breast-fed: may have breast milk up to 2 hours before exam. - Infants, formula: may have bottle until 4 hours before exam.  Please call the Imaging Department at your exam site with any questions.            Jun 21, 2017 12:15 PM CDT   (Arrive by 12:00 PM)   RETURN ENDOCRINE with Jennifer Wilcox MD   [facility] Endocrinology ([facility])    [address]  [address] [facility] [address] [phone]            Sep 14, 2017  12:30 PM CDT   (Arrive by 12:15 PM)   Return Visit with Lamin Gonzalez DPM   [facility] Endocrinology ([facility])    [address] [facility]  [address] [facility] [address] [phone]            Oct 16, 2017 12:00 PM CDT   Lab with ZAK LAB   [facility] Lab ([facility])    [address] [id number] [phone]            Oct 16, 2017  1:00 PM CDT   (Arrive by 12:45 PM)   Return General Liver with Santi Dotson MD   [facility] Hepatology ([facility])    [address] [address] [phone]              Future tests that were ordered for you today     Open Future Orders        Priority Expected Expires Ordered    US Abdomen Complete Routine 6/17/2017 4/17/2018 4/17/2017            Who to contact     If you have questions or need follow up information about today's clinic visit or your schedule please contact [facility] HEPATOLOGY directly at [phone].  Normal or non-critical lab and imaging results will be communicated to you by Gobyhart, letter or phone within 4 business days after the clinic has received the results. If you do not hear from us within 7 days, please contact the clinic through Hitat or phone. If you have a critical or abnormal lab result, we will notify you by phone as soon as possible.  Submit refill requests through [email or web address] or call your pharmacy and they will forward the refill request to us. Please allow 3 business days for your refill to be completed.          Additional Information About Your Visit        Gobyhart Information     [email or web address] gives you secure access to your electronic health record. If you see a primary care provider, you can also send messages to your care team and make appointments. If you have questions, please call your primary care clinic.  If you do not have a primary care provider, please call [phone] and they will  "assist you.        Care EveryWhere ID     This is your Care EveryWhere ID. This could be used by other organizations to access your [place] medical records  [id number]        Your Vitals Were     Pulse Height Pulse Oximetry BMI (Body Mass Index)          74 1.753 m (5' 9\") 100% 29.12 kg/m2         Blood Pressure from Last 3 Encounters:   04/17/17 105/69   04/03/17 122/68   03/09/17 134/76    Weight from Last 3 Encounters:   04/17/17 89.4 kg (197 lb 3.2 oz)   04/03/17 89.4 kg (197 lb 3.2 oz)   03/09/17 87.5 kg (193 lb)               Primary Care Provider Office Phone # Fax #    Natalie Mitzi Russell [phone]       [address]  [facility] [address]        Thank you!     Thank you for choosing [facility] HEPATOLOGY  for your care. Our goal is always to provide you with excellent care. Hearing back from our patients is one way we can continue to improve our services. Please take a few minutes to complete the written survey that you may receive in the mail after your visit with us. Thank you!             Your Updated Medication List - Protect others around you: Learn how to safely use, store and throw away your medicines at www.disposemymeds.org.          This list is accurate as of: 4/17/17  1:26 PM.  Always use your most recent med list.                   Brand Name Dispense Instructions for use    aspirin 81 MG tablet     90 tablet    Take 1 tablet (81 mg) by mouth daily       BD VIKTORIA U/F 32G X 4 MM   Generic drug:  insulin pen needle      U 6 D       blood glucose monitoring lancets     3 Box    Use to test blood sugar 4 times daily or as directed.  1 box = 102 lancets       blood glucose monitoring test strip    ACCU-CHEK EDINSON PLUS    400 each    Use to test blood sugar 4 times daily       carvedilol 12.5 MG tablet    COREG    180 tablet    Take 1 tablet (12.5 mg) by mouth 2 times daily (with meals)       insulin aspart 100 UNIT/ML injection    NovoLOG FLEXPEN "    24 mL    1 unit per 4 Gms CHO at meals and snacks + correction scale of 1 unit per 25 mg/dL over 125. Average daily dose is 75 units.       insulin degludec 200 UNIT/ML pen    TRESIBA    21 mL    Take 120 units daily.       lactulose 10 GM/15ML solution    CHRONULAC     Take 60 mLs by mouth 4 times daily       metFORMIN 500 MG 24 hr tablet    GLUCOPHAGE-XR    60 tablet    Take 2 tablets (1,000 mg) by mouth daily (with dinner)       OLANZapine 2.5 MG tablet    zyPREXA    30 tablet    Take 1 tablet (2.5 mg) by mouth At Bedtime       omeprazole 40 MG capsule    priLOSEC    90 capsule    Take 1 capsule (40 mg) by mouth daily       potassium chloride SA 20 MEQ CR tablet    K-DUR/KLOR-CON M    90 tablet    Take 1 tablet (20 mEq) by mouth daily       RA VITAMIN B-12 TR 1000 MCG Tbcr   Generic drug:  cyanocobalamin      Take 1,000 mcg by mouth daily       rifaximin 550 MG Tabs tablet    XIFAXAN    60 tablet    Take 1 tablet (550 mg) by mouth 2 times daily       sildenafil 50 MG cap/tab    REVATIO/VIAGRA    12 tablet    Take 0.5-1 tablets (25-50 mg) by mouth daily as needed for erectile dysfunction Take 30 min to 4 hours before intercourse       simvastatin 20 MG tablet    ZOCOR    90 tablet    Take 1 tablet (20 mg) by mouth At Bedtime       spironolactone 25 MG tablet    ALDACTONE    90 tablet    Take 1 tablet (25 mg) by mouth daily       tadalafil 5 MG tablet    CIALIS    20 tablet    Take 1 tablet (5 mg) by mouth as needed for erectile dysfunction       tamsulosin 0.4 MG capsule    FLOMAX    90 capsule    Take 1 capsule (0.4 mg) by mouth daily       THERAVITE PO      Take 1 tablet by mouth daily

## 2017-04-17 NOTE — NURSING NOTE
"Chief Complaint   Patient presents with     RECHECK     Follow up for cirrhosis of the liver       Initial /69  Pulse 74  Ht 1.753 m (5' 9\")  Wt 89.4 kg (197 lb 3.2 oz)  SpO2 100%  BMI 29.12 kg/m2 Estimated body mass index is 29.12 kg/(m^2) as calculated from the following:    Height as of this encounter: 1.753 m (5' 9\").    Weight as of this encounter: 89.4 kg (197 lb 3.2 oz).  Medication Reconciliation: complete   Rose Baker CMA    "

## 2017-04-25 DIAGNOSIS — G47.00 INSOMNIA, UNSPECIFIED TYPE: ICD-10-CM

## 2017-04-26 ENCOUNTER — MYC MEDICAL ADVICE (OUTPATIENT)
Dept: ENDOCRINOLOGY | Facility: CLINIC | Age: 52
End: 2017-04-26

## 2017-04-26 DIAGNOSIS — Z79.4 TYPE 2 DIABETES MELLITUS WITH HYPERGLYCEMIA, WITH LONG-TERM CURRENT USE OF INSULIN (H): Primary | ICD-10-CM

## 2017-04-26 DIAGNOSIS — E11.65 TYPE 2 DIABETES MELLITUS WITH HYPERGLYCEMIA, WITH LONG-TERM CURRENT USE OF INSULIN (H): Primary | ICD-10-CM

## 2017-04-26 RX ORDER — OLANZAPINE 2.5 MG/1
2.5 TABLET, FILM COATED ORAL AT BEDTIME
Qty: 30 TABLET | Refills: 3 | Status: SHIPPED | OUTPATIENT
Start: 2017-04-26 | End: 2017-08-16

## 2017-04-26 NOTE — TELEPHONE ENCOUNTER
OLANZapine (ZYPREXA) 2.5 MG      Last Written Prescription Date:  12/28/16  Last Fill Quantity: 30,   # refills: 3  Last Office Visit : 3/9/17  Future Office visit:  6/9/17    Routing refill request to provider for review/approval because:  Drug not on refill protocol FOR DIAGNOSIS

## 2017-05-04 RX ORDER — DAPAGLIFLOZIN 10 MG/1
10 TABLET, FILM COATED ORAL DAILY
Qty: 30 TABLET | Refills: 3 | Status: SHIPPED | OUTPATIENT
Start: 2017-05-04 | End: 2017-08-22

## 2017-06-04 ENCOUNTER — MYC MEDICAL ADVICE (OUTPATIENT)
Dept: INTERNAL MEDICINE | Facility: CLINIC | Age: 52
End: 2017-06-04

## 2017-06-04 DIAGNOSIS — E11.9 TYPE 2 DIABETES MELLITUS WITHOUT COMPLICATION, UNSPECIFIED LONG TERM INSULIN USE STATUS: Primary | ICD-10-CM

## 2017-06-09 ENCOUNTER — OFFICE VISIT (OUTPATIENT)
Dept: INTERNAL MEDICINE | Facility: CLINIC | Age: 52
End: 2017-06-09

## 2017-06-09 VITALS
RESPIRATION RATE: 20 BRPM | WEIGHT: 186.5 LBS | SYSTOLIC BLOOD PRESSURE: 96 MMHG | OXYGEN SATURATION: 97 % | DIASTOLIC BLOOD PRESSURE: 59 MMHG | BODY MASS INDEX: 27.54 KG/M2 | HEART RATE: 66 BPM

## 2017-06-09 DIAGNOSIS — D64.9 ANEMIA, UNSPECIFIED TYPE: ICD-10-CM

## 2017-06-09 DIAGNOSIS — Z79.4 TYPE 2 DIABETES MELLITUS WITH COMPLICATION, WITH LONG-TERM CURRENT USE OF INSULIN (H): Primary | ICD-10-CM

## 2017-06-09 DIAGNOSIS — E55.9 VITAMIN D DEFICIENCY: ICD-10-CM

## 2017-06-09 DIAGNOSIS — R53.83 FATIGUE, UNSPECIFIED TYPE: ICD-10-CM

## 2017-06-09 DIAGNOSIS — E11.8 TYPE 2 DIABETES MELLITUS WITH COMPLICATION, WITH LONG-TERM CURRENT USE OF INSULIN (H): Primary | ICD-10-CM

## 2017-06-09 DIAGNOSIS — E11.9 TYPE 2 DIABETES MELLITUS WITHOUT COMPLICATION, UNSPECIFIED LONG TERM INSULIN USE STATUS: ICD-10-CM

## 2017-06-09 LAB
DEPRECATED CALCIDIOL+CALCIFEROL SERPL-MC: 29 UG/L (ref 20–75)
FERRITIN SERPL-MCNC: 450 NG/ML (ref 26–388)
HBA1C MFR BLD: 6.5 % (ref 4.3–6)
TSH SERPL DL<=0.005 MIU/L-ACNC: 0.42 MU/L (ref 0.4–4)

## 2017-06-09 ASSESSMENT — PAIN SCALES - GENERAL: PAINLEVEL: MODERATE PAIN (4)

## 2017-06-09 NOTE — PATIENT INSTRUCTIONS
[facility] Medication Refill Request Information:  * Please contact your pharmacy regarding ANY request for medication refills.  ** [facility] Prescription Fax = [phone]  * Please allow 3 business days for routine medication refills.  * Please allow 5 business days for controlled substance medication refills.     [facility] Test Result notification information:  *You will be notified with in 7-10 days of your appointment day regarding the results of your test.  If you are on MyChart you will be notified as soon as the provider has reviewed the results and signed off on them.    [facility] [phone]     Decrease tresiba to 115 units.

## 2017-06-09 NOTE — PROGRESS NOTES
"                     PRIMARY CARE CENTER       SUBJECTIVE:  Frandy Workman is a 52 year old male with a history of cirrhosis 2nd to ESTRADA and hepatic encepholapathy who comes in for 3 month diabetic follow-up.     Miller was switched from metformin to Farxiga 10 mg daily about 1 month ago due to GI upset; states he is tolerating it well. He also takes  tresiba 120 units daily and Novolog sliding scale. Per meter review home blood sugar readings have been variable with some low readings, including a 67 on June 6th. He states he knows when his sugar is low because he feels \"low energy and tired, and sometimes sweating at night.\" Denies vision changes, dizziness, syncope, chest pain/pressure, palpitations, or SOB. He has a history of diabetic neuropathy in his feet. He is due to see eye doctor in August. He would like a referral.   Miller has been experiencing increased tiredness over the past 3 weeks. He reports that he walks for exercise and he is now only able to walk 0.5 miles, compared to his usual 3 miles. Denies chest pain/pressure, palpitations, or SOB. Further denies seeing blood in his urine or stool. Miller also has on-going right sided abdominal pain. He is scheduled to have a abdominal US on 6/12/17 and follow up with GI in October.   Medications and allergies reviewed by me today.     OBJECTIVE:    BP 96/59 (BP Location: Right arm, Patient Position: Chair, Cuff Size: Adult Regular)  Pulse 66  Resp 20  Wt 84.6 kg (186 lb 8 oz)  SpO2 97%  BMI 27.54 kg/m2   Wt Readings from Last 1 Encounters:   06/09/17 84.6 kg (186 lb 8 oz)     General: Miller is alert, cooperative and in no acute distress.   ENT: oropharynx clear, MMM  Neck: No carotid bruits, Thyroid palpable, not enlarged or tender. No lymphadenopathy.  Chest & Lungs: Respirations are unlabored and regular. Breath sounds are clear and equal throughout  Cardiovascular: regular S1 and S2; no murmurs. Peripheral pulses +2. No edema.   Abdomen: rounded and soft. " Bowel sounds active x4 quadrants. No palpable tenderness or masses. No hepatosplenomegaly.    Lab Results   Component Value Date    A1C 6.5 06/09/2017    A1C 7.8 10/25/2016       ASSESSMENT/PLAN:    Frandy was seen today for diabetes and pain.    Diagnoses and all orders for this visit:    Mr. Jesuss A1C was significantly reduced today at 6.5 from 7.8 in February. His recent medication change 1 month ago, metformin was switched to Farxiga, may be contributing to this reduction. Given he has had some low readings in the mornings, I instructed patient to reduce tresiba dose to from 120 to 115 units every night and follow-up with endocrinology at appointment scheduled later this month. Placed referral to ophthalmology.     Etiology of recent onset fatigue is unclear. Will obtain vitamin D, ferritin, and TSH today for further evaluation. If labs are normal, discussed doing a sleep study. Also discussed with patient that his chronic hepatic encephalopathy may contribute to fatigue at times.     Type 2 diabetes mellitus with complication, with long-term current use of insulin (H)  -     OPHTHALMOLOGY ADULT REFERRAL   Reduce tresiba from 120 to 115 units    Fatigue, unspecified type  -     Vitamin D Deficiency; Future  -     Ferritin; Future  -     TSH with free T4 reflex; Future    Vitamin D deficiency   -     Vitamin D Deficiency; Future    Anemia, unspecified type  -     Ferritin       Pt should return to clinic for f/u with me in 6 months    Scribe Disclosure:   I, Cortney Martínez - NP Student, am serving as a scribe; to document services personally performed by Dr. Natalie Russell-based on data collection and the provider's statements to me.     Provider Disclosure:  I agree with above History, Review of Systems, Physical exam and Plan. I have reviewed the content of the documentation and have edited it as needed. I have personally performed the services documented here and the documentation accurately  represents those services and the decisions I have made.     Natalie Russell MD

## 2017-06-09 NOTE — NURSING NOTE
Chief Complaint   Patient presents with     Diabetes     3 moth follow up diabetes     Pain     right mid quadrant around to right upper back

## 2017-06-09 NOTE — MR AVS SNAPSHOT
After Visit Summary   6/9/2017    Frandy Workman    MRN: [id number]           Patient Information     Date Of Birth          1965        Visit Information        Provider Department      6/9/2017 12:30 PM Natalie Chun MD [facility]        Today's Diagnoses     Type 2 diabetes mellitus with complication, with long-term current use of insulin (H)    -  1    Fatigue, unspecified type        Vitamin D deficiency         Anemia, unspecified type          Care Instructions    Primary Care Center Medication Refill Request Information:  * Please contact your pharmacy regarding ANY request for medication refills.  ** [facility] Prescription Fax = [phone]  * Please allow 3 business days for routine medication refills.  * Please allow 5 business days for controlled substance medication refills.     Primary Care Center Test Result notification information:  *You will be notified with in 7-10 days of your appointment day regarding the results of your test.  If you are on MyChart you will be notified as soon as the provider has reviewed the results and signed off on them.    Primary Care Center [phone]     Decrease tresiba to 115 units.           Follow-ups after your visit        Additional Services     OPHTHALMOLOGY ADULT REFERRAL       Your provider has referred you to: [facility]: Eye Clinic - [place] [phone]   [email or web address]    Please be aware that coverage of these services is subject to the terms and limitations of your health insurance plan.  Call member services at your health plan with any benefit or coverage questions.      Please bring the following with you to your appointment:    (1) Any X-Rays, CTs or MRIs which have been performed.  Contact the facility where they were done to arrange for  prior to your scheduled appointment.    (2) List of current medications  (3) This referral request   (4) Any documents/labs  given to you for this referral                  Follow-up notes from your care team     Return in about 6 months (around 12/9/2017).      Your next 10 appointments already scheduled     Jun 09, 2017 12:30 PM CDT   (Arrive by 12:15 PM)   Return Visit with Natalie Russell MD   [facility] Primary Care Clinic ([facility])    [address] [place] [address]  [address] [facility] [address]   [phone]            Jun 12, 2017 10:45 AM CDT   US ABDOMEN COMPLETE with UCUS1   [facility] Imaging Center US ([facility])    [address] [facility]  [address] [facility] [address] [phone]           Please bring a list of your medicines (including vitamins, minerals and over-the-counter drugs). Also, tell your doctor about any allergies you may have. Wear comfortable clothes and leave your valuables at home.  Adults: No eating or drinking for 8 hours before the exam. You may take medicine with a small sip of water.  Children: - Children 6+ years: No food or drink for 6 hours before exam. - Children 1-5 years: No food or drink for 4 hours before exam. - Infants, breast-fed: may have breast milk up to 2 hours before exam. - Infants, formula: may have bottle until 4 hours before exam.  Please call the Imaging Department at your exam site with any questions.            Jun 21, 2017 12:15 PM CDT   (Arrive by 12:00 PM)   RETURN ENDOCRINE with Jennifer Wilcox MD   [facility] Endocrinology ([facility])    [address] [place] [address]  [address] [facility] [address]   [phone]            Jun 21, 2017  1:20 PM CDT   (Arrive by 1:05 PM)   NEW GENERAL with Tom Amezquita OD   [facility] Ophthalmology ([facility])    [address] [place] [address]  [address] [facility] [address]   [phone]            Sep 14, 2017 12:30 PM CDT   (Arrive by 12:15 PM)   Return Visit with Lamin Gonzalez DPM   [facility] Endocrinology (  [facility])    [address] [facility]  [address] [facility] [address]   [phone]            Oct 16, 2017 12:00 PM CDT   Lab with ZAK LAB   [facility] Lab ([facility])    [address] [place] [address]  [address] [facility] [address]   [phone]            Oct 16, 2017  1:00 PM CDT   (Arrive by 12:45 PM)   Return General Liver with Santi Dotson MD   [facility] Hepatology ([facility])    [address] [place] [address]  [address] [facility] [address]   [phone]            Dec 11, 2017 12:00 PM CST   (Arrive by 11:45 AM)   Return Visit with Natalie Russell MD   [facility] Clinic ([facility])    [address] [place] [address]  [address] [facility] [id number] [phone]              Future tests that were ordered for you today     Open Future Orders        Priority Expected Expires Ordered    Vitamin D Deficiency Routine 6/9/2017 6/9/2018 6/9/2017    Ferritin Routine 6/9/2017 6/9/2018 6/9/2017    TSH with free T4 reflex Routine 6/9/2017 6/23/2017 6/9/2017            Who to contact     Please call your clinic at [phone] to:    Ask questions about your health    Make or cancel appointments    Discuss your medicines    Learn about your test results    Speak to your doctor   If you have compliments or concerns about an experience at your clinic, or if you wish to file a complaint, please contact [facility] Patient Relations at [phone] or email us at [email or web address]         Additional Information About Your Visit        MyChart Information     Skyonichart gives you secure access to your electronic health record. If you see a primary care provider, you can also send messages to your care team and make appointments. If you have questions, please call your primary care clinic.  If you do not have a primary care provider, please call [phone] and  they will assist you.      Jewel Toned is an electronic gateway that provides easy, online access to your medical records. With Jewel Toned, you can request a clinic appointment, read your test results, renew a prescription or communicate with your care team.     To access your existing account, please contact your [facility] or call [phone] for assistance.        Care EveryWhere ID     This is your Care EveryWhere ID. This could be used by other organizations to access your [place] medical records  [id number]        Your Vitals Were     Pulse Respirations Pulse Oximetry BMI (Body Mass Index)          66 20 97% 27.54 kg/m2         Blood Pressure from Last 3 Encounters:   06/09/17 96/59   04/17/17 105/69   04/03/17 122/68    Weight from Last 3 Encounters:   06/09/17 84.6 kg (186 lb 8 oz)   04/17/17 89.4 kg (197 lb 3.2 oz)   04/03/17 89.4 kg (197 lb 3.2 oz)              We Performed the Following     OPHTHALMOLOGY ADULT REFERRAL        Primary Care Provider Office Phone # Fax #    Natalie Russell [phone]       [address]  [facility] [address]        Thank you!     Thank you for choosing [facility] PRIMARY CARE CLINIC  for your care. Our goal is always to provide you with excellent care. Hearing back from our patients is one way we can continue to improve our services. Please take a few minutes to complete the written survey that you may receive in the mail after your visit with us. Thank you!             Your Updated Medication List - Protect others around you: Learn how to safely use, store and throw away your medicines at www.disposemymeds.org.          This list is accurate as of: 6/9/17 12:17 PM.  Always use your most recent med list.                   Brand Name Dispense Instructions for use    aspirin 81 MG tablet     90 tablet    Take 1 tablet (81 mg) by mouth daily       BD VIKTORIA U/F 32G X 4 MM   Generic drug:  insulin  pen needle      U 6 D       blood glucose monitoring lancets     3 Box    Use to test blood sugar 4 times daily or as directed.  1 box = 102 lancets       blood glucose monitoring test strip    ACCU-CHEK EDINSON PLUS    400 each    Use to test blood sugar 4 times daily       carvedilol 12.5 MG tablet    COREG    180 tablet    Take 1 tablet (12.5 mg) by mouth 2 times daily (with meals)       dapagliflozin 10 MG Tabs tablet    FARXIGA    30 tablet    Take 1 tablet (10 mg) by mouth daily       insulin aspart 100 UNIT/ML injection    NovoLOG FLEXPEN    24 mL    1 unit per 4 Gms CHO at meals and snacks + correction scale of 1 unit per 25 mg/dL over 125. Average daily dose is 75 units.       insulin degludec 200 UNIT/ML pen    TRESIBA    21 mL    Take 120 units daily.       lactulose 10 GM/15ML solution    CHRONULAC     Take 60 mLs by mouth 4 times daily       metFORMIN 500 MG 24 hr tablet    GLUCOPHAGE-XR    60 tablet    Take 2 tablets (1,000 mg) by mouth daily (with dinner)       OLANZapine 2.5 MG tablet    zyPREXA    30 tablet    Take 1 tablet (2.5 mg) by mouth At Bedtime       omeprazole 40 MG capsule    priLOSEC    90 capsule    Take 1 capsule (40 mg) by mouth daily       potassium chloride SA 20 MEQ CR tablet    K-DUR/KLOR-CON M    90 tablet    Take 1 tablet (20 mEq) by mouth daily       RA VITAMIN B-12 TR 1000 MCG Tbcr   Generic drug:  cyanocobalamin      Take 1,000 mcg by mouth daily       rifaximin 550 MG Tabs tablet    XIFAXAN    60 tablet    Take 1 tablet (550 mg) by mouth 2 times daily       sildenafil 50 MG cap/tab    REVATIO/VIAGRA    12 tablet    Take 0.5-1 tablets (25-50 mg) by mouth daily as needed for erectile dysfunction Take 30 min to 4 hours before intercourse       simvastatin 20 MG tablet    ZOCOR    90 tablet    Take 1 tablet (20 mg) by mouth At Bedtime       spironolactone 25 MG tablet    ALDACTONE    90 tablet    Take 1 tablet (25 mg) by mouth daily       tadalafil 5 MG tablet    CIALIS    20  tablet    Take 1 tablet (5 mg) by mouth as needed for erectile dysfunction       tamsulosin 0.4 MG capsule    FLOMAX    90 capsule    Take 1 capsule (0.4 mg) by mouth daily       THERAVITE PO      Take 1 tablet by mouth daily

## 2017-06-13 ASSESSMENT — ENCOUNTER SYMPTOMS
SWOLLEN GLANDS: 0
SORE THROAT: 0
RECTAL PAIN: 0
FATIGUE: 1
MUSCLE WEAKNESS: 0
NERVOUS/ANXIOUS: 1
NIGHT SWEATS: 1
ALTERED TEMPERATURE REGULATION: 1
TINGLING: 1
WEIGHT LOSS: 0
INSOMNIA: 1
EYE IRRITATION: 1
TROUBLE SWALLOWING: 0
SEIZURES: 0
LEG PAIN: 0
LIGHT-HEADEDNESS: 0
LEG SWELLING: 1
EYE WATERING: 0
CONSTIPATION: 0
BRUISES/BLEEDS EASILY: 1
TREMORS: 0
EYE PAIN: 1
ARTHRALGIAS: 1
WEAKNESS: 0
MYALGIAS: 1
DEPRESSION: 1
SINUS CONGESTION: 1
DIZZINESS: 0
WEIGHT GAIN: 0
POLYPHAGIA: 0
SNORES LOUDLY: 1
SINUS PAIN: 1
PANIC: 0
TASTE DISTURBANCE: 0
SLEEP DISTURBANCES DUE TO BREATHING: 1
JAUNDICE: 0
MUSCLE CRAMPS: 1
DISTURBANCES IN COORDINATION: 0
HYPOTENSION: 0
ORTHOPNEA: 0
SPUTUM PRODUCTION: 1
SPEECH CHANGE: 1
NECK MASS: 0
POSTURAL DYSPNEA: 0
PARALYSIS: 0
CHILLS: 1
WHEEZING: 0
RESPIRATORY PAIN: 0
HALLUCINATIONS: 0
PALPITATIONS: 0
JOINT SWELLING: 0
DOUBLE VISION: 0
HEADACHES: 0
EXERCISE INTOLERANCE: 1
BLOOD IN STOOL: 0
HOARSE VOICE: 1
RECTAL BLEEDING: 0
SKIN CHANGES: 0
BLOATING: 1
STIFFNESS: 1
BOWEL INCONTINENCE: 0
SHORTNESS OF BREATH: 0
NECK PAIN: 1
DYSPNEA ON EXERTION: 0
INCREASED ENERGY: 1
COUGH DISTURBING SLEEP: 1
FEVER: 0
COUGH: 1
SMELL DISTURBANCE: 0
DIARRHEA: 1
TACHYCARDIA: 0
ABDOMINAL PAIN: 1
POOR WOUND HEALING: 1
CLAUDICATION: 1
LOSS OF CONSCIOUSNESS: 0
BACK PAIN: 1
VOMITING: 0
HEMOPTYSIS: 0
HEARTBURN: 0
NAIL CHANGES: 0
EYE REDNESS: 0
DECREASED APPETITE: 0
SYNCOPE: 0
NUMBNESS: 1
DECREASED CONCENTRATION: 1
POLYDIPSIA: 0
NAUSEA: 0
MEMORY LOSS: 1
HYPERTENSION: 0

## 2017-06-21 ENCOUNTER — OFFICE VISIT (OUTPATIENT)
Dept: OPHTHALMOLOGY | Facility: CLINIC | Age: 52
End: 2017-06-21

## 2017-06-21 ENCOUNTER — OFFICE VISIT (OUTPATIENT)
Dept: ENDOCRINOLOGY | Facility: CLINIC | Age: 52
End: 2017-06-21

## 2017-06-21 VITALS
DIASTOLIC BLOOD PRESSURE: 65 MMHG | BODY MASS INDEX: 27.56 KG/M2 | HEART RATE: 69 BPM | WEIGHT: 186.1 LBS | HEIGHT: 69 IN | SYSTOLIC BLOOD PRESSURE: 104 MMHG

## 2017-06-21 DIAGNOSIS — Z79.4 TYPE 2 DIABETES MELLITUS WITH HYPERGLYCEMIA, WITH LONG-TERM CURRENT USE OF INSULIN (H): Primary | ICD-10-CM

## 2017-06-21 DIAGNOSIS — E87.6 HYPOKALEMIA: ICD-10-CM

## 2017-06-21 DIAGNOSIS — H52.13 MYOPIA, BILATERAL: ICD-10-CM

## 2017-06-21 DIAGNOSIS — E11.3293 TYPE 2 DIABETES MELLITUS WITH MILD NONPROLIFERATIVE RETINOPATHY OF BOTH EYES WITHOUT MACULAR EDEMA, UNSPECIFIED LONG TERM INSULIN USE STATUS: Primary | ICD-10-CM

## 2017-06-21 DIAGNOSIS — H25.13 SENILE NUCLEAR SCLEROSIS, BILATERAL: ICD-10-CM

## 2017-06-21 DIAGNOSIS — E11.65 TYPE 2 DIABETES MELLITUS WITH HYPERGLYCEMIA, WITH LONG-TERM CURRENT USE OF INSULIN (H): Primary | ICD-10-CM

## 2017-06-21 ASSESSMENT — REFRACTION_WEARINGRX
OS_AXIS: 046
OD_CYLINDER: +0.75
SPECS_TYPE: PAL
OS_CYLINDER: +1.50
OD_ADD: +2.00
OD_AXIS: 108
OD_SPHERE: -8.50
OS_ADD: +2.00
OS_SPHERE: -7.50

## 2017-06-21 ASSESSMENT — TONOMETRY
OS_IOP_MMHG: 10
IOP_METHOD: TONOPEN
OD_IOP_MMHG: 11

## 2017-06-21 ASSESSMENT — SLIT LAMP EXAM - LIDS
COMMENTS: NORMAL
COMMENTS: SMALL PAPILLOMA ALONG LL MARGIN

## 2017-06-21 ASSESSMENT — REFRACTION_MANIFEST
OD_CYLINDER: +0.75
OS_SPHERE: -7.75
OD_AXIS: 123
OS_ADD: +2.00
OS_CYLINDER: +1.00
OD_ADD: +2.00
OS_AXIS: 045
OD_SPHERE: -7.50

## 2017-06-21 ASSESSMENT — CONF VISUAL FIELD
OS_NORMAL: 1
METHOD: COUNTING FINGERS
OD_NORMAL: 1

## 2017-06-21 ASSESSMENT — PAIN SCALES - GENERAL: PAINLEVEL: NO PAIN (0)

## 2017-06-21 ASSESSMENT — CUP TO DISC RATIO
OD_RATIO: 0.25
OS_RATIO: 0.3

## 2017-06-21 ASSESSMENT — VISUAL ACUITY
OD_CC: 20/25
OS_CC+: -1
CORRECTION_TYPE: GLASSES
OS_CC: 20/20
METHOD: SNELLEN - LINEAR
OD_CC+: -1

## 2017-06-21 ASSESSMENT — EXTERNAL EXAM - RIGHT EYE: OD_EXAM: NORMAL

## 2017-06-21 ASSESSMENT — EXTERNAL EXAM - LEFT EYE: OS_EXAM: NORMAL

## 2017-06-21 NOTE — PROGRESS NOTES
Assessment/Treatment Plan:      Frandy Workman is a 51 year old year old male with    1. Type 2 Diabetes since early 30s with improved BG control with neuropathy.   A1c 6.5 today after addition of Farxiga  There is no evidence of chronic diabetic complications.  A1c is not a reliable indicator of glycemic control: Anemia, does not correlate with BG levels. However, improving A1c is reassuring. BG in AM is borderline low 70-90    Patient Instructions   Reduce Tresiba to110 units daily.   Test fasting blood sugars every morning. If blood sugars are less than 2 days in a row, reduce Tresiba by 2 units.     Novolog for meals: 1 unit for every 7.5 gm of carb    Novolog for correction: 1 unit for every 30 mg/dL rise over 125.     Check blood sugars before breakfast and before dinner.             Jennifer Wilcox MD  [id number]  Endocrinology Service        =================================================    Endocrinology Clinic Visit    Chief Complaint: RECHECK (DIABETES TYPE 2 F/U )       Subjective:         HPI: Frandy Workman is a 51 year old year old male with history of ESTRADA with liver cirrhosis who is seen in follow up for T2DM - uncontrolled. He moved from [place] to [place] to be close to his DTR and his BG has been uncontrolled now. He attributes it to lack of exercise as he did and diet changes in the assisted living. His A1c in the [facility] was better but on review of records from the past his BG were still high back in [place].     He has had diabetes since 2001. He was intially started on Metformin but after diagnosis of liver failure, this was discontinued.     Neuropathy - tingling in feet bilateral   Nephropathy - none  Retinopathy - no per patient (exam 2016)   Hypoglycemia - none    Prevention  On Statin.     Has polyphagia, no polydipsia or polyuria.   No change in Bowel habits.   Appetite increased since moving to [place]  Weight gain  No neck pain or difficulty swallowing.     Barriers: He has overcome  most of his barriers.   A1c is not a reliable indicator of glycemic control: Anemia, does not correlate with BG levels.   BG is high all day long: diet with high carb in it. He avoided snacking and lunches  ?? Poor absorption of Lantus high higher dose. -- This was changed to Tresiba  Lack of oral medication use due to liver cirrhosis. Responded well to Tresiba      Download from her meter shows:  [id number] , 67  Recent values mostly in 70-90 fasting and up to 140 prandial      Insulin regimen  Tresiba 110 units daily  Aspart 1 units per 4 gm CHO  Correction two units per 25 mg/dL over 100.    Exercise  He is walking 3-6 months daily.       No Known Allergies    Current Outpatient Prescriptions   Medication Sig Dispense Refill     Cholecalciferol (VITAMIN D-3 PO) Take 2,000 Units by mouth       dapagliflozin (FARXIGA) 10 MG TABS tablet Take 1 tablet (10 mg) by mouth daily 30 tablet 3     OLANZapine (ZYPREXA) 2.5 MG tablet Take 1 tablet (2.5 mg) by mouth At Bedtime 30 tablet 3     potassium chloride SA (K-DUR/KLOR-CON M) 20 MEQ CR tablet Take 1 tablet (20 mEq) by mouth daily 90 tablet 0     insulin degludec (TRESIBA) 200 UNIT/ML pen Take 120 units daily. 21 mL 11     tamsulosin (FLOMAX) 0.4 MG capsule Take 1 capsule (0.4 mg) by mouth daily 90 capsule 1     omeprazole (PRILOSEC) 40 MG capsule Take 1 capsule (40 mg) by mouth daily 90 capsule 1     spironolactone (ALDACTONE) 25 MG tablet Take 1 tablet (25 mg) by mouth daily 90 tablet 3     rifaximin (XIFAXAN) 550 MG TABS tablet Take 1 tablet (550 mg) by mouth 2 times daily 60 tablet 11     insulin aspart (NOVOLOG FLEXPEN) 100 UNIT/ML injection 1 unit per 4 Gms CHO at meals and snacks + correction scale of 1 unit per 25 mg/dL over 125. Average daily dose is 75 units. 24 mL 11     blood glucose monitoring (ACCU-CHEK EDINSON PLUS) test strip Use to test blood sugar 4 times daily 400 each 3     blood glucose monitoring (ACCU-CHEK FASTCLIX) lancets Use to test blood sugar 4 times  daily or as directed.  1 box = 102 lancets 3 Box 3     tadalafil (CIALIS) 5 MG tablet Take 1 tablet (5 mg) by mouth as needed for erectile dysfunction 20 tablet 1     carvedilol (COREG) 12.5 MG tablet Take 1 tablet (12.5 mg) by mouth 2 times daily (with meals) 180 tablet 3     simvastatin (ZOCOR) 20 MG tablet Take 1 tablet (20 mg) by mouth At Bedtime 90 tablet 3     sildenafil (VIAGRA) 50 MG tablet Take 0.5-1 tablets (25-50 mg) by mouth daily as needed for erectile dysfunction Take 30 min to 4 hours before intercourse 12 tablet 11     aspirin 81 MG tablet Take 1 tablet (81 mg) by mouth daily 90 tablet 3     cyanocobalamin (RA VITAMIN B-12 TR) 1000 MCG TBCR Take 1,000 mcg by mouth daily       lactulose (CHRONULAC) 10 GM/15ML solution Take 60 mLs by mouth 4 times daily       insulin pen needle (BD VIKTORIA U/F) 32G X 4 MM U 6 D       Multiple Vitamin (THERAVITE PO) Take 1 tablet by mouth daily         Review of Systems   No eye symptoms  No headache, change in vision, loss of peripheral vision  No neck pain, no other lumps in the neck  No change in breathing    No change in swallowing.    No swelling in extremities  No change in mental status.    Other ROS that were obtained were negative.         Past Medical History:   Diagnosis Date     BPH (benign prostatic hyperplasia)      Cholelithiasis      Hyperlipidemia      Liver cirrhosis secondary to ESTRADA (H)      Type II diabetes mellitus (H)        Past Surgical History:   Procedure Laterality Date     ESOPHAGOSCOPY, GASTROSCOPY, DUODENOSCOPY (EGD), COMBINED N/A 11/17/2016    Procedure: COMBINED ESOPHAGOSCOPY, GASTROSCOPY, DUODENOSCOPY (EGD);  Surgeon: Santi Rosas MD;  Location:  GI     KNEE SURGERY Left        Family History   Problem Relation Age of Onset     Prostate Cancer Maternal Grandfather      Colon Cancer Father 60     Pancreatic Cancer Father 60     Prostate Cancer Father      Colorectal Cancer Father      Macular Degeneration Father      CANCER  "Father      Colorectal Cancer Maternal Grandmother      Colorectal Cancer Paternal Grandmother      CANCER Mother      Liver Disease No family hx of        Social History     Social History     Marital Status:      Spouse Name: N/A     Number of Children: N/A     Years of Education: N/A     Social History Main Topics     Smoking status: Never Smoker      Smokeless tobacco: Current User      Comment: 1 tin per week     Alcohol Use: No      Comment: quit Sept. 1996     Drug Use: No     Sexual Activity: Not Asked     Other Topics Concern     None     Social History Narrative       Objective:   /65 (BP Location: Right arm, Patient Position: Chair, Cuff Size: Adult Regular)  Pulse 69  Ht 1.753 m (5' 9\")  Wt 84.4 kg (186 lb 1.6 oz)  BMI 27.48 kg/m2  Constitutional: Appears well-developed and well-nourished. Active.   EYES: anicteric, normal extra-ocular movements, no lid lag or retraction   HEENT: Mouth/Throat: Mucous membrane is moist. Oropharynx is clear. No adenopathy. Normal thyroid   Cardiovascular: RRR, S1, S2 normal. No m/g/r   Pulmonary/Chest: CTAB. No wheezing or rales   Abdominal: +BS. Distended.   Neurological: Alert. Normal affect. CNII-XII intact. Muscle strength 5/5. Sensory is intact.  Extremities: No clubbing, cyanosis or inflammation   Skin: normal texture, color  Feet: Tingling +. Sensation is intact.   Lymphatic: no cervical lymphadenopathy.  Psychological: appropriate mood     In House Labs:   Lab Results   Component Value Date    A1C 6.5 06/09/2017    A1C 7.8 10/25/2016          TSH   Date Value Ref Range Status   06/09/2017 0.42 0.40 - 4.00 mU/L Final       Creatinine   Date Value Ref Range Status   04/03/2017 1.06 0.66 - 1.25 mg/dL Final   ]    Recent Labs   Lab Test  10/25/16   1731   CHOL  164   HDL  33*   LDL  90   TRIG  205*         "

## 2017-06-21 NOTE — LETTER
6/21/2017       RE: Frandy Workman  [address]   [facility] [address]     Dear Colleague,    Thank you for referring your patient, Frandy Workman, to the [facility] ENDOCRINOLOGY at [facility]. Please see a copy of my visit note below.      Assessment/Treatment Plan:      Frandy Workman is a 51 year old year old male with    1. Type 2 Diabetes since early 30s with improved BG control with neuropathy.   A1c 6.5 today after addition of Farxiga  There is no evidence of chronic diabetic complications.  A1c is not a reliable indicator of glycemic control: Anemia, does not correlate with BG levels. However, improving A1c is reassuring. BG in AM is borderline low 70-90    Patient Instructions   Reduce Tresiba to110 units daily.   Test fasting blood sugars every morning. If blood sugars are less than 2 days in a row, reduce Tresiba by 2 units.     Novolog for meals: 1 unit for every 7.5 gm of carb    Novolog for correction: 1 unit for every 30 mg/dL rise over 125.     Check blood sugars before breakfast and before dinner.             Jennifer Wilcox MD  [id number]  Endocrinology Service        =================================================    Endocrinology Clinic Visit    Chief Complaint: RECHECK (DIABETES TYPE 2 F/U )       Subjective:         HPI: Frandy Workman is a 51 year old year old male with history of ESTRADA with liver cirrhosis who is seen in follow up for T2DM - uncontrolled. He moved from [place] to [place] to be close to his DTR and his BG has been uncontrolled now. He attributes it to lack of exercise as he did and diet changes in the assisted living. His A1c in the [facility] was better but on review of records from the past his BG were still high back in [place].     He has had diabetes since 2001. He was intially started on Metformin but after diagnosis of liver failure, this was discontinued.     Neuropathy - tingling in feet bilateral   Nephropathy -  none  Retinopathy - no per patient (exam 2016)   Hypoglycemia - none    Prevention  On Statin.     Has polyphagia, no polydipsia or polyuria.   No change in Bowel habits.   Appetite increased since moving to [place]  Weight gain  No neck pain or difficulty swallowing.     Barriers: He has overcome most of his barriers.   A1c is not a reliable indicator of glycemic control: Anemia, does not correlate with BG levels.   BG is high all day long: diet with high carb in it. He avoided snacking and lunches  ?? Poor absorption of Lantus high higher dose. -- This was changed to Tresiba  Lack of oral medication use due to liver cirrhosis. Responded well to Tresiba      Download from her meter shows:  [id number] , 67  Recent values mostly in 70-90 fasting and up to 140 prandial      Insulin regimen  Tresiba 110 units daily  Aspart 1 units per 4 gm CHO  Correction two units per 25 mg/dL over 100.    Exercise  He is walking 3-6 months daily.       No Known Allergies    Current Outpatient Prescriptions   Medication Sig Dispense Refill     Cholecalciferol (VITAMIN D-3 PO) Take 2,000 Units by mouth       dapagliflozin (FARXIGA) 10 MG TABS tablet Take 1 tablet (10 mg) by mouth daily 30 tablet 3     OLANZapine (ZYPREXA) 2.5 MG tablet Take 1 tablet (2.5 mg) by mouth At Bedtime 30 tablet 3     potassium chloride SA (K-DUR/KLOR-CON M) 20 MEQ CR tablet Take 1 tablet (20 mEq) by mouth daily 90 tablet 0     insulin degludec (TRESIBA) 200 UNIT/ML pen Take 120 units daily. 21 mL 11     tamsulosin (FLOMAX) 0.4 MG capsule Take 1 capsule (0.4 mg) by mouth daily 90 capsule 1     omeprazole (PRILOSEC) 40 MG capsule Take 1 capsule (40 mg) by mouth daily 90 capsule 1     spironolactone (ALDACTONE) 25 MG tablet Take 1 tablet (25 mg) by mouth daily 90 tablet 3     rifaximin (XIFAXAN) 550 MG TABS tablet Take 1 tablet (550 mg) by mouth 2 times daily 60 tablet 11     insulin aspart (NOVOLOG FLEXPEN) 100 UNIT/ML injection 1 unit per 4 Gms CHO at meals and snacks  + correction scale of 1 unit per 25 mg/dL over 125. Average daily dose is 75 units. 24 mL 11     blood glucose monitoring (ACCU-CHEK EDINSON PLUS) test strip Use to test blood sugar 4 times daily 400 each 3     blood glucose monitoring (ACCU-CHEK FASTCLIX) lancets Use to test blood sugar 4 times daily or as directed.  1 box = 102 lancets 3 Box 3     tadalafil (CIALIS) 5 MG tablet Take 1 tablet (5 mg) by mouth as needed for erectile dysfunction 20 tablet 1     carvedilol (COREG) 12.5 MG tablet Take 1 tablet (12.5 mg) by mouth 2 times daily (with meals) 180 tablet 3     simvastatin (ZOCOR) 20 MG tablet Take 1 tablet (20 mg) by mouth At Bedtime 90 tablet 3     sildenafil (VIAGRA) 50 MG tablet Take 0.5-1 tablets (25-50 mg) by mouth daily as needed for erectile dysfunction Take 30 min to 4 hours before intercourse 12 tablet 11     aspirin 81 MG tablet Take 1 tablet (81 mg) by mouth daily 90 tablet 3     cyanocobalamin (RA VITAMIN B-12 TR) 1000 MCG TBCR Take 1,000 mcg by mouth daily       lactulose (CHRONULAC) 10 GM/15ML solution Take 60 mLs by mouth 4 times daily       insulin pen needle (BD VIKTORIA U/F) 32G X 4 MM U 6 D       Multiple Vitamin (THERAVITE PO) Take 1 tablet by mouth daily       Review of Systems   No eye symptoms  No headache, change in vision, loss of peripheral vision  No neck pain, no other lumps in the neck  No change in breathing    No change in swallowing.    No swelling in extremities  No change in mental status.    Other ROS that were obtained were negative.       Past Medical History:   Diagnosis Date     BPH (benign prostatic hyperplasia)      Cholelithiasis      Hyperlipidemia      Liver cirrhosis secondary to ESTRADA (H)      Type II diabetes mellitus (H)        Past Surgical History:   Procedure Laterality Date     ESOPHAGOSCOPY, GASTROSCOPY, DUODENOSCOPY (EGD), COMBINED N/A 11/17/2016    Procedure: COMBINED ESOPHAGOSCOPY, GASTROSCOPY, DUODENOSCOPY (EGD);  Surgeon: Santi Rosas MD;   "Location: [facility]     KNEE SURGERY Left        Family History   Problem Relation Age of Onset     Prostate Cancer Maternal Grandfather      Colon Cancer Father 60     Pancreatic Cancer Father 60     Prostate Cancer Father      Colorectal Cancer Father      Macular Degeneration Father      CANCER Father      Colorectal Cancer Maternal Grandmother      Colorectal Cancer Paternal Grandmother      CANCER Mother      Liver Disease No family hx of        Social History     Social History     Marital Status:      Spouse Name: N/A     Number of Children: N/A     Years of Education: N/A     Social History Main Topics     Smoking status: Never Smoker      Smokeless tobacco: Current User      Comment: 1 tin per week     Alcohol Use: No      Comment: quit Sept. 1996     Drug Use: No     Sexual Activity: Not Asked     Other Topics Concern     None     Social History Narrative       Objective:   /65 (BP Location: Right arm, Patient Position: Chair, Cuff Size: Adult Regular)  Pulse 69  Ht 1.753 m (5' 9\")  Wt 84.4 kg (186 lb 1.6 oz)  BMI 27.48 kg/m2  Constitutional: Appears well-developed and well-nourished. Active.   EYES: anicteric, normal extra-ocular movements, no lid lag or retraction   HEENT: Mouth/Throat: Mucous membrane is moist. Oropharynx is clear. No adenopathy. Normal thyroid   Cardiovascular: RRR, S1, S2 normal. No m/g/r   Pulmonary/Chest: CTAB. No wheezing or rales   Abdominal: +BS. Distended.   Neurological: Alert. Normal affect. CNII-XII intact. Muscle strength 5/5. Sensory is intact.  Extremities: No clubbing, cyanosis or inflammation   Skin: normal texture, color  Feet: Tingling +. Sensation is intact.   Lymphatic: no cervical lymphadenopathy.  Psychological: appropriate mood     In House Labs:   Lab Results   Component Value Date    A1C 6.5 06/09/2017    A1C 7.8 10/25/2016        TSH   Date Value Ref Range Status   06/09/2017 0.42 0.40 - 4.00 mU/L Final       Creatinine   Date Value Ref Range " Status   04/03/2017 1.06 0.66 - 1.25 mg/dL Final   ]    Recent Labs   Lab Test  10/25/16   1731   CHOL  164   HDL  33*   LDL  90   TRIG  205*       Jennifer Wilcox MD

## 2017-06-21 NOTE — LETTER
June 21, 2017          Your patient, Fradny Workman, was examined in the Ophthalmology Clinic today for a diabetic evaluation.     Examination of the right eye today shows: Non-proliferative diabetic retinopathy:  mild.    Examination ofthe left eye today shows: Non-proliferative diabetic retinopathy:  mild.    The condition of the eyes today is: progressive.    Treatment recommended: Monitor.    I recommend follow-up examination in: 1 year.    I have encouraged the patient to continue working with you to maintain tight control of blood glucose and blood pressure.   Thank you for allowing us to participate in the care of this patient.     Sincerely,    Tom Amezquita OD, FAAO

## 2017-06-21 NOTE — PATIENT INSTRUCTIONS
Reduce Tresiba to110 units daily.   Test fasting blood sugars every morning. If blood sugars are less than 2 days in a row, reduce Tresiba by 2 units.     Novolog for meals: 1 unit for every 7.5 gm of carb    Novolog for correction: 1 unit for every 30 mg/dL rise over 125.     Check blood sugars before breakfast and before dinner.

## 2017-06-21 NOTE — MR AVS SNAPSHOT
After Visit Summary   6/21/2017    Frandy Workman    MRN: [id number]           Patient Information     Date Of Birth          1965        Visit Information        Provider Department      6/21/2017 1:20 PM Tom Amezquita OD [facility] Op[facility]mology        Today's Diagnoses     Type 2 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, unspecified long term insulin use status    -  1    Myopia, bilateral        Senile nuclear sclerosis, bilateral           Follow-ups after your visit        Follow-up notes from your care team     Return in about 1 year (around 6/21/2018) for Annual Visit.      Your next 10 appointments already scheduled     Sep 14, 2017 12:30 PM CDT   (Arrive by 12:15 PM)   Return Visit with Lamin Gonzalez DPM   [facility] End[facility]inology ([facility])    [address] [place] [id number] [phone]            Oct 16, 2017 12:00 PM CDT   Lab with  LAB   [facility] ([facility])    [address] [place] [id number] [phone]            Oct 16, 2017  1:00 PM CDT   (Arrive by 12:45 PM)   Return General Liver with Santi Dotson MD   [facility] Hepatology ([facility])    [address] [place] [id number] [phone]            Oct 23, 2017  1:00 PM CDT   (Arrive by 12:45 PM)   RETURN ENDOCRINE with Jennifer Wilcox MD   [facility] ([facility])    [address] [place] [id number] [phone]            Dec 11, 2017 12:00 PM CST   (Arrive by 11:45 AM)   Return Visit with Natalie Russell MD   [facility] ([facility])    [address] [place] [address] [id number] [phone]              Who to contact     Please call your clinic at  [phone] to:    Ask questions about your health    Make or cancel appointments    Discuss your medicines    Learn about your test results    Speak to your doctor   If you have compliments or concerns about an experience at your clinic, or if you wish to file a complaint, please contact [facility] Patient Relations at [phone] or email us at [email or web address]         Additional Information About Your Visit        Communic[employer] Information     [employer] gives you secure access to your electronic health record. If you see a primary care provider, you can also send messages to your care team and make appointments. If you have questions, please call your primary care clinic.  If you do not have a primary care provider, please call [phone] and they will assist you.      [employer] is an electronic gateway that provides easy, online access to your medical records. With [employer], you can request a clinic appointment, read your test results, renew a prescription or communicate with your care team.     To access your existing account, please contact your [facility] or call [phone] for assistance.        Care EveryWhere ID     This is your Care EveryWhere ID. This could be used by other organizations to access your [place] medical records  [id number]         Blood Pressure from Last 3 Encounters:   06/21/17 104/65   06/09/17 96/59   04/17/17 105/69    Weight from Last 3 Encounters:   06/21/17 84.4 kg (186 lb 1.6 oz)   06/09/17 84.6 kg (186 lb 8 oz)   04/17/17 89.4 kg (197 lb 3.2 oz)              We Performed the Following     Refraction          Today's Medication Changes          These changes are accurate as of: 6/21/17  2:23 PM.  If you have any questions, ask your nurse or doctor.               Stop taking these medicines if you haven't already. Please contact your care team if you have questions.     metFORMIN 500 MG 24 hr tablet   Commonly  known as:  GLUCOPHAGE-XR   Stopped by:  Jennifer Wilcox MD                    Primary Care Provider Office Phone # Fax #    Natalie Mitzi Andrés Russell [phone]       [address]  [facility] [address]        Equal Access to Services     [facility] : Hadii aad ku hadasho Soomaali, waaxda luqadaha, qaybta kaalmada adeegyada, waxay idiin hayaan adeeg kharash la'aan ah. So [facility] [phone].    ATENCIÓN: Si habla español, tiene a hanley disposición servicios gratuitos de asistencia lingüística. Nguyename al [phone].    We comply with applicable federal civil rights laws and [place] laws. We do not discriminate on the basis of race, color, national origin, age, disability sex, sexual orientation or gender identity.            Thank you!     Thank you for choosing [facility] OPHTHALMOLOGY  for your care. Our goal is always to provide you with excellent care. Hearing back from our patients is one way we can continue to improve our services. Please take a few minutes to complete the written survey that you may receive in the mail after your visit with us. Thank you!             Your Updated Medication List - Protect others around you: Learn how to safely use, store and throw away your medicines at www.disposemymeds.org.          This list is accurate as of: 6/21/17  2:23 PM.  Always use your most recent med list.                   Brand Name Dispense Instructions for use Diagnosis    aspirin 81 MG tablet     90 tablet    Take 1 tablet (81 mg) by mouth daily    Type 2 diabetes mellitus without complication, with long-term current use of insulin (H)       BD VIKTORIA U/F 32G X 4 MM   Generic drug:  insulin pen needle      U 6 D        blood glucose monitoring lancets     3 Box    Use to test blood sugar 4 times daily or as directed.  1 box = 102 lancets    Type II diabetes mellitus (H)       blood glucose monitoring test strip    ACCU-CHEK EDINSON PLUS    400 each    Use  to test blood sugar 4 times daily    Type II diabetes mellitus (H)       carvedilol 12.5 MG tablet    COREG    180 tablet    Take 1 tablet (12.5 mg) by mouth 2 times daily (with meals)    Liver cirrhosis secondary to ESTRADA (H), Secondary esophageal varices without bleeding (H)       dapagliflozin 10 MG Tabs tablet    FARXIGA    30 tablet    Take 1 tablet (10 mg) by mouth daily    Type 2 diabetes mellitus with hyperglycemia, with long-term current use of insulin (H)       insulin aspart 100 UNIT/ML injection    NovoLOG FLEXPEN    24 mL    1 unit per 4 Gms CHO at meals and snacks + correction scale of 1 unit per 25 mg/dL over 125. Average daily dose is 75 units.    Type II diabetes mellitus (H)       insulin degludec 200 UNIT/ML pen    TRESIBA    21 mL    Take 120 units daily.    Type 2 diabetes mellitus with hyperglycemia, with long-term current use of insulin (H)       lactulose 10 GM/15ML solution    CHRONULAC     Take 60 mLs by mouth 4 times daily        OLANZapine 2.5 MG tablet    zyPREXA    30 tablet    Take 1 tablet (2.5 mg) by mouth At Bedtime    Insomnia, unspecified type       omeprazole 40 MG capsule    priLOSEC    90 capsule    Take 1 capsule (40 mg) by mouth daily    Gastroesophageal reflux disease, esophagitis presence not specified       potassium chloride SA 20 MEQ CR tablet    K-DUR/KLOR-CON M    90 tablet    Take 1 tablet (20 mEq) by mouth daily    Hypokalemia       RA VITAMIN B-12 TR 1000 MCG Tbcr   Generic drug:  cyanocobalamin      Take 1,000 mcg by mouth daily        rifaximin 550 MG Tabs tablet    XIFAXAN    60 tablet    Take 1 tablet (550 mg) by mouth 2 times daily    Hepatic encephalopathy (H)       sildenafil 50 MG cap/tab    REVATIO/VIAGRA    12 tablet    Take 0.5-1 tablets (25-50 mg) by mouth daily as needed for erectile dysfunction Take 30 min to 4 hours before intercourse    Erectile dysfunction, unspecified erectile dysfunction type       simvastatin 20 MG tablet    ZOCOR    90 tablet     Take 1 tablet (20 mg) by mouth At Bedtime    Hyperlipidemia LDL goal <100, Type 2 diabetes mellitus without complication, with long-term current use of insulin (H)       spironolactone 25 MG tablet    ALDACTONE    90 tablet    Take 1 tablet (25 mg) by mouth daily    Other ascites       tadalafil 5 MG tablet    CIALIS    20 tablet    Take 1 tablet (5 mg) by mouth as needed for erectile dysfunction    Erectile dysfunction due to diseases classified elsewhere       tamsulosin 0.4 MG capsule    FLOMAX    90 capsule    Take 1 capsule (0.4 mg) by mouth daily    Benign prostatic hyperplasia with lower urinary tract symptoms, unspecified morphology       THERAVITE PO      Take 1 tablet by mouth daily        VITAMIN D-3 PO      Take 2,000 Units by mouth

## 2017-06-21 NOTE — MR AVS SNAPSHOT
After Visit Summary   6/21/2017    Frandy Workman    MRN: [id number]           Patient Information     Date Of Birth          1965        Visit Information        Provider Department      6/21/2017 12:15 PM Jennifer Wilcox MD [facility]        Care Instructions    Reduce Tresiba to110 units daily.   Test fasting blood sugars every morning. If blood sugars are less than 2 days in a row, reduce Tresiba by 2 units.     Novolog for meals: 1 unit for every 7.5 gm of carb    Novolog for correction: 1 unit for every 30 mg/dL rise over 125.     Check blood sugars before breakfast and before dinner.               Follow-ups after your visit        Follow-up notes from your care team     Return in about 4 months (around 10/21/2017).      Your next 10 appointments already scheduled     Sep 14, 2017 12:30 PM CDT   (Arrive by 12:15 PM)   Return Visit with Lamin Gonzalez DPM   [facility]in[facility] ([facility])    [address] [place] [id number] [phone]            Oct 16, 2017 12:00 PM CDT   Lab with  LAB   [facility] ([facility])    [address] [place] [id number] [phone]            Oct 16, 2017  1:00 PM CDT   (Arrive by 12:45 PM)   Return General Liver with Santi Dotson MD   [facility] Hepat[facility] ([facility])    [address] [place] [address]   [phone]            Oct 23, 2017  1:00 PM CDT   (Arrive by 12:45 PM)   RETURN ENDOCRINE with Jennifer Wilcox MD   [facility] ([facility])    [address] [place] [id number]   [phone]            Dec 11, 2017 12:00 PM CST   (Arrive by 11:45 AM)   Return Visit with Natalie Russell MD   [facility] ([facility])    [address] [place] [address]  [address] [facility] [address] [phone]              Who to contact     Please call your clinic at [phone] to:    Ask questions about your health    Make or cancel appointments    Discuss your medicines    Learn about your test results    Speak to your doctor   If you have compliments or concerns about an experience at your clinic, or if you wish to file a complaint, please contact [facility] Patient Relations at [phone] or email us at [email or web address]         Additional Information About Your Visit        [employer] Information     [employer] gives you secure access to your electronic health record. If you see a primary care provider, you can also send messages to your care team and make appointments. If you have questions, please call your primary care clinic.  If you do not have a primary care provider, please call [phone] and they will assist you.      [employer] is an electronic gateway that provides easy, online access to your medical records. With [employer], you can request a clinic appointment, read your test results, renew a prescription or communicate with your care team.     To access your existing account, please contact your [facility] or call [phone] for assistance.        Care EveryWhere ID     This is your Care EveryWhere ID. This could be used by other organizations to access your [place] medical records  [id number]        Your Vitals Were     Pulse Height BMI (Body Mass Index)             69 1.753 m (5' 9\") 27.48 kg/m2          Blood Pressure from Last 3 Encounters:   06/21/17 104/65   06/09/17 96/59   04/17/17 105/69    Weight from Last 3 Encounters:   06/21/17 84.4 kg (186 lb 1.6 oz)   06/09/17 84.6 kg (186 lb 8 oz)   04/17/17 89.4 kg (197 lb 3.2 oz)              Today, you had the following     No orders found for display         Today's Medication Changes          These changes are " accurate as of: 6/21/17  2:08 PM.  If you have any questions, ask your nurse or doctor.               Stop taking these medicines if you haven't already. Please contact your care team if you have questions.     metFORMIN 500 MG 24 hr tablet   Commonly known as:  GLUCOPHAGE-XR   Stopped by:  Jennifer Wilcox MD                    Primary Care Provider Office Phone # Fax Tristin Cartagena Andrés Russell [phone]       [address]  [facility] [address]        Equal Access to Services     [facility]: Hadii aad ku hadasho Soomaali, waaxda luqadaha, qaybta kaalmada adeegyaluis, emy mcdonald . So [facility] [phone].    ATENCIÓN: Si habla español, tiene a hanley disposición servicios gratuitos de asistencia lingüística. [facility] [phone].    We comply with applicable federal civil rights laws and [place] laws. We do not discriminate on the basis of race, color, national origin, age, disability sex, sexual orientation or gender identity.            Thank you!     Thank you for choosing [facility] ENDOCRINOLOGY  for your care. Our goal is always to provide you with excellent care. Hearing back from our patients is one way we can continue to improve our services. Please take a few minutes to complete the written survey that you may receive in the mail after your visit with us. Thank you!             Your Updated Medication List - Protect others around you: Learn how to safely use, store and throw away your medicines at www.disposemymeds.org.          This list is accurate as of: 6/21/17  2:08 PM.  Always use your most recent med list.                   Brand Name Dispense Instructions for use Diagnosis    aspirin 81 MG tablet     90 tablet    Take 1 tablet (81 mg) by mouth daily    Type 2 diabetes mellitus without complication, with long-term current use of insulin (H)       BD VIKTORIA U/F 32G X 4 MM   Generic drug:  insulin pen needle       U 6 D        blood glucose monitoring lancets     3 Box    Use to test blood sugar 4 times daily or as directed.  1 box = 102 lancets    Type II diabetes mellitus (H)       blood glucose monitoring test strip    ACCU-CHEK EDINSON PLUS    400 each    Use to test blood sugar 4 times daily    Type II diabetes mellitus (H)       carvedilol 12.5 MG tablet    COREG    180 tablet    Take 1 tablet (12.5 mg) by mouth 2 times daily (with meals)    Liver cirrhosis secondary to ESTRADA (H), Secondary esophageal varices without bleeding (H)       dapagliflozin 10 MG Tabs tablet    FARXIGA    30 tablet    Take 1 tablet (10 mg) by mouth daily    Type 2 diabetes mellitus with hyperglycemia, with long-term current use of insulin (H)       insulin aspart 100 UNIT/ML injection    NovoLOG FLEXPEN    24 mL    1 unit per 4 Gms CHO at meals and snacks + correction scale of 1 unit per 25 mg/dL over 125. Average daily dose is 75 units.    Type II diabetes mellitus (H)       insulin degludec 200 UNIT/ML pen    TRESIBA    21 mL    Take 120 units daily.    Type 2 diabetes mellitus with hyperglycemia, with long-term current use of insulin (H)       lactulose 10 GM/15ML solution    CHRONULAC     Take 60 mLs by mouth 4 times daily        OLANZapine 2.5 MG tablet    zyPREXA    30 tablet    Take 1 tablet (2.5 mg) by mouth At Bedtime    Insomnia, unspecified type       omeprazole 40 MG capsule    priLOSEC    90 capsule    Take 1 capsule (40 mg) by mouth daily    Gastroesophageal reflux disease, esophagitis presence not specified       potassium chloride SA 20 MEQ CR tablet    K-DUR/KLOR-CON M    90 tablet    Take 1 tablet (20 mEq) by mouth daily    Hypokalemia       RA VITAMIN B-12 TR 1000 MCG Tbcr   Generic drug:  cyanocobalamin      Take 1,000 mcg by mouth daily        rifaximin 550 MG Tabs tablet    XIFAXAN    60 tablet    Take 1 tablet (550 mg) by mouth 2 times daily    Hepatic encephalopathy (H)       sildenafil 50 MG cap/tab    REVATIO/VIAGRA    12  tablet    Take 0.5-1 tablets (25-50 mg) by mouth daily as needed for erectile dysfunction Take 30 min to 4 hours before intercourse    Erectile dysfunction, unspecified erectile dysfunction type       simvastatin 20 MG tablet    ZOCOR    90 tablet    Take 1 tablet (20 mg) by mouth At Bedtime    Hyperlipidemia LDL goal <100, Type 2 diabetes mellitus without complication, with long-term current use of insulin (H)       spironolactone 25 MG tablet    ALDACTONE    90 tablet    Take 1 tablet (25 mg) by mouth daily    Other ascites       tadalafil 5 MG tablet    CIALIS    20 tablet    Take 1 tablet (5 mg) by mouth as needed for erectile dysfunction    Erectile dysfunction due to diseases classified elsewhere       tamsulosin 0.4 MG capsule    FLOMAX    90 capsule    Take 1 capsule (0.4 mg) by mouth daily    Benign prostatic hyperplasia with lower urinary tract symptoms, unspecified morphology       THERAVITE PO      Take 1 tablet by mouth daily        VITAMIN D-3 PO      Take 2,000 Units by mouth

## 2017-06-21 NOTE — PROGRESS NOTES
Assessment/Plan  (E11.9824) Type 2 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, unspecified long term insulin use status  (primary encounter diagnosis)  Comment: Mild NPDR OU  Plan:  Educated patient on clinical findings and the importance of continued management with primary care physician. Continue management as directed and return to clinic in 1 year for dilated exam, or sooner, as needed. Letter sent to primary care provider and endocrinologist regarding findings.    (H52.13) Myopia, bilateral  Comment: High myopia with presbyopia OU  Plan: Refraction   Educated patient on condition and clinical findings. Dispensed spectacle prescription for full time wear. Educated patient on possibility of adaptation period, if symptoms do not improve return to clinic for further testing.   Educated patient on signs and symptoms of retinal detachment including increase in flashes, floaters, or a change in vision. If symptoms present, return to clinic immediately.    (H25.13) Senile nuclear sclerosis, bilateral  Comment: Not visually significant  Plan:  No treatment indicated at this time. Monitor annually.      Complete documentation of historical and exam elements from today's encounter can  be found in the full encounter summary report (not reduplicated in this progress  note). I personally obtained the chief complaint(s) and history of present illness. I  confirmed and edited as necessary the review of systems, past medical/surgical  history, family history, social history, and examination findings as documented by  others; and I examined the patient myself. I personally reviewed the relevant tests,  images, and reports as documented above. I formulated and edited as necessary the  assessment and plan and discussed the findings and management plan with the  patient and family.    Tom Amezquita, OD, FAAO

## 2017-06-21 NOTE — NURSING NOTE
"Chief Complaint   Patient presents with     RECHECK     DIABETES TYPE 2 F/U        Initial /65 (BP Location: Right arm, Patient Position: Chair, Cuff Size: Adult Regular)  Pulse 69  Ht 1.753 m (5' 9\")  Wt 84.4 kg (186 lb 1.6 oz)  BMI 27.48 kg/m2 Estimated body mass index is 27.48 kg/(m^2) as calculated from the following:    Height as of this encounter: 1.753 m (5' 9\").    Weight as of this encounter: 84.4 kg (186 lb 1.6 oz).  Medication Reconciliation: complete    "

## 2017-06-21 NOTE — NURSING NOTE
Chief Complaints and History of Present Illnesses   Patient presents with     Eye Exam For Diabetes     HPI    Affected eye(s):  Both   Symptoms:     No blurred vision   Difficulty with reading   No floaters   No flashes      Frequency:  Constant       Do you have eye pain now?:  No      Comments:  Pt states no problems with vision. Pt states last exam was last august. Gls work well but has noticed that it is sometimes better to take gls off to read.     Diabetes Type II diagnosed 15 years ago. Controlled. Last A1c 6.9%.    Binu Vides COT 1:19 PM June 21, 2017

## 2017-06-22 RX ORDER — POTASSIUM CHLORIDE 1500 MG/1
20 TABLET, EXTENDED RELEASE ORAL DAILY
Qty: 90 TABLET | Refills: 3 | Status: SHIPPED | OUTPATIENT
Start: 2017-06-22 | End: 2018-06-16

## 2017-06-22 NOTE — TELEPHONE ENCOUNTER
potassium chloride       Last Written Prescription Date:  4/6/17  Last Fill Quantity: 90,   # refills: 0  Last Office Visit : 6/9/17  Future Office visit:  2/11/17  Potassium   Date Value Ref Range Status   04/03/2017 5.3 3.4 - 5.3 mmol/L Final

## 2017-06-29 ENCOUNTER — MEDICAL CORRESPONDENCE (OUTPATIENT)
Dept: HEALTH INFORMATION MANAGEMENT | Facility: CLINIC | Age: 52
End: 2017-06-29

## 2017-07-07 ENCOUNTER — MEDICAL CORRESPONDENCE (OUTPATIENT)
Dept: HEALTH INFORMATION MANAGEMENT | Facility: CLINIC | Age: 52
End: 2017-07-07

## 2017-08-16 DIAGNOSIS — G47.00 INSOMNIA, UNSPECIFIED TYPE: ICD-10-CM

## 2017-08-17 NOTE — TELEPHONE ENCOUNTER
OLANZapine       Last Written Prescription Date:  4/26/17  Last Fill Quantity: 30,   # refills: 3  Last Office Visit : 6/9/17  Future Office visit:  12/11/17  Lab Results   Component Value Date    A1C 6.5 06/09/2017     Recent Labs   Lab Test  10/25/16   1731   CHOL  164   HDL  33*   LDL  90   TRIG  205*     BP Readings from Last 3 Encounters:   06/21/17 104/65   06/09/17 96/59   04/17/17 105/69     Drug not on refill protocol  FOR DIAGNOSIS

## 2017-08-18 RX ORDER — OLANZAPINE 2.5 MG/1
2.5 TABLET, FILM COATED ORAL AT BEDTIME
Qty: 30 TABLET | Refills: 5 | Status: SHIPPED | OUTPATIENT
Start: 2017-08-18 | End: 2018-02-13

## 2017-08-22 DIAGNOSIS — Z79.4 TYPE 2 DIABETES MELLITUS WITH HYPERGLYCEMIA, WITH LONG-TERM CURRENT USE OF INSULIN (H): ICD-10-CM

## 2017-08-22 DIAGNOSIS — E11.65 TYPE 2 DIABETES MELLITUS WITH HYPERGLYCEMIA, WITH LONG-TERM CURRENT USE OF INSULIN (H): ICD-10-CM

## 2017-08-22 NOTE — TELEPHONE ENCOUNTER
"dapagliflozin (FARXIGA) 10 MG TABS tablet      Last Written Prescription Date:  5/4/2017  Last Fill Quantity: 30,   # refills: 3  Last Office Visit : 6/21/2017  Future Office visit:  10/16/2017    Per last fill instructions \"10 mg, Oral, DAILY Starting 5/4/2017, Until Discontinued, Disp-30 tablet, R-3\"   Pending Rx to provider to determine months supply/additional refills.     "

## 2017-08-23 RX ORDER — DAPAGLIFLOZIN 10 MG/1
10 TABLET, FILM COATED ORAL DAILY
Qty: 90 TABLET | Refills: 3 | Status: SHIPPED | OUTPATIENT
Start: 2017-08-23 | End: 2017-10-12

## 2017-08-30 ENCOUNTER — MYC MEDICAL ADVICE (OUTPATIENT)
Dept: GASTROENTEROLOGY | Facility: CLINIC | Age: 52
End: 2017-08-30

## 2017-08-30 DIAGNOSIS — K75.81 LIVER CIRRHOSIS SECONDARY TO NASH (H): Primary | ICD-10-CM

## 2017-08-30 DIAGNOSIS — K74.60 LIVER CIRRHOSIS SECONDARY TO NASH (H): Primary | ICD-10-CM

## 2017-08-31 DIAGNOSIS — E11.9 TYPE II DIABETES MELLITUS (H): ICD-10-CM

## 2017-08-31 RX ORDER — INSULIN ASPART 100 [IU]/ML
INJECTION, SOLUTION INTRAVENOUS; SUBCUTANEOUS
Qty: 15 ML | Refills: 0 | OUTPATIENT
Start: 2017-08-31

## 2017-09-08 ENCOUNTER — TELEPHONE (OUTPATIENT)
Dept: INTERNAL MEDICINE | Facility: CLINIC | Age: 52
End: 2017-09-08

## 2017-09-08 DIAGNOSIS — K11.8 PAROTID DUCT OBSTRUCTION: Primary | ICD-10-CM

## 2017-09-08 DIAGNOSIS — K75.81 LIVER CIRRHOSIS SECONDARY TO NASH (H): ICD-10-CM

## 2017-09-08 DIAGNOSIS — K74.60 LIVER CIRRHOSIS SECONDARY TO NASH (H): ICD-10-CM

## 2017-09-08 LAB
BASOPHILS # BLD AUTO: 0 10E9/L (ref 0–0.2)
BASOPHILS NFR BLD AUTO: 0.4 %
DIFFERENTIAL METHOD BLD: ABNORMAL
EOSINOPHIL # BLD AUTO: 0.1 10E9/L (ref 0–0.7)
EOSINOPHIL NFR BLD AUTO: 3 %
ERYTHROCYTE [DISTWIDTH] IN BLOOD BY AUTOMATED COUNT: 13.3 % (ref 10–15)
HCT VFR BLD AUTO: 33.1 % (ref 40–53)
HGB BLD-MCNC: 11.3 G/DL (ref 13.3–17.7)
IMM GRANULOCYTES # BLD: 0 10E9/L (ref 0–0.4)
IMM GRANULOCYTES NFR BLD: 0.4 %
LYMPHOCYTES # BLD AUTO: 0.5 10E9/L (ref 0.8–5.3)
LYMPHOCYTES NFR BLD AUTO: 19.9 %
MCH RBC QN AUTO: 36.1 PG (ref 26.5–33)
MCHC RBC AUTO-ENTMCNC: 34.1 G/DL (ref 31.5–36.5)
MCV RBC AUTO: 106 FL (ref 78–100)
MONOCYTES # BLD AUTO: 0.3 10E9/L (ref 0–1.3)
MONOCYTES NFR BLD AUTO: 9.7 %
NEUTROPHILS # BLD AUTO: 1.8 10E9/L (ref 1.6–8.3)
NEUTROPHILS NFR BLD AUTO: 66.6 %
NRBC # BLD AUTO: 0 10*3/UL
NRBC BLD AUTO-RTO: 0 /100
PLATELET # BLD AUTO: 37 10E9/L (ref 150–450)
RBC # BLD AUTO: 3.13 10E12/L (ref 4.4–5.9)
WBC # BLD AUTO: 2.7 10E9/L (ref 4–11)

## 2017-09-08 NOTE — TELEPHONE ENCOUNTER
Referral done , pt notified.  Liz Gross RN  --------------------      ----- Message from Natalie Russell MD sent at 9/7/2017 10:59 AM CDT -----  Regarding: RE: [facility] calling in for FYI to Dr Bowen  That's fine.   ----- Message -----     From: Liz Gross RN     Sent: 9/7/2017  10:44 AM       To: Natalie Rsusell MD  Subject: FW: [facility] calling in for FYI to Dr Hernandez    Let me know if this ok with you.  Thanks,  Liz Gross RN    ----- Message -----     From: Chloe Amor     Sent: 9/6/2017   3:00 PM       To: Liz Gross RN  Subject: [facility] calling in for FYI to Dr Andrés Nuñez from [facility] called in and wanted a message relayed to Dr Bowen regarding this Pt. The Pt was in to see Dr Nuñez [facility] and may possibly have a Blocked Parotide gland and may need a referral to ENT...         Thanks   Chloe

## 2017-09-14 ENCOUNTER — OFFICE VISIT (OUTPATIENT)
Dept: ENDOCRINOLOGY | Facility: CLINIC | Age: 52
End: 2017-09-14

## 2017-09-14 DIAGNOSIS — E11.49 DIABETIC NEUROPATHY WITH NEUROLOGIC COMPLICATION (H): Primary | ICD-10-CM

## 2017-09-14 DIAGNOSIS — M72.2 PLANTAR FASCIITIS: ICD-10-CM

## 2017-09-14 DIAGNOSIS — E11.40 DIABETIC NEUROPATHY WITH NEUROLOGIC COMPLICATION (H): Primary | ICD-10-CM

## 2017-09-14 NOTE — MR AVS SNAPSHOT
After Visit Summary   9/14/2017    Frandy Workman    MRN: [id number]           Patient Information     Date Of Birth          1965        Visit Information        Provider Department      9/14/2017 12:30 PM Lamin Gonzalez DPM [facility]        Today's Diagnoses     Diabetic neuropathy with neurologic complication (H)    -  1    Plantar fasciitis           Follow-ups after your visit        Your next 10 appointments already scheduled     Sep 20, 2017 11:00 AM CDT   [facility]    [address] [place] [address] [id number] [phone]            Oct 16, 2017 12:00 PM CDT   Lab with  LAB   [facility] ([facility])    [address] [place] [id number] [phone]            Oct 16, 2017  1:00 PM CDT   (Arrive by 12:45 PM)   Return General Liver with Santi Dotson MD   [facility] ([facility])    [address] [place] [id number] [phone]            Oct 16, 2017  2:00 PM CDT   (Arrive by 1:45 PM)   RETURN ENDOCRINE with Jennifer Wilcox MD   [facility] ([facility])    [address] [place] [id number] [phone]            Dec 11, 2017  2:00 PM CST   (Arrive by 1:45 PM)   Return Visit with Natalie Russell MD   [facility] ([facility])    [address] [place] [address] [id number] [phone]            Mar 08, 2018 12:30 PM CST   (Arrive by 12:15 PM)   Return Visit with Lamin Gonzalez DPM   [facility] ([facility])    [address] [place] [address] [id number] [phone]              Who to contact     Please call your clinic at [phone] to:    Ask  questions about your health    Make or cancel appointments    Discuss your medicines    Learn about your test results    Speak to your doctor   If you have compliments or concerns about an experience at your clinic, or if you wish to file a complaint, please contact [facility] Patient Relations at [phone] or email us at [email or web address]         Additional Information About Your Visit        MyChart Information     Ed[employer] gives you secure access to your electronic health record. If you see a primary care provider, you can also send messages to your care team and make appointments. If you have questions, please call your primary care clinic.  If you do not have a primary care provider, please call [phone] and they will assist you.      FastCall is an electronic gateway that provides easy, online access to your medical records. With [employer], you can request a clinic appointment, read your test results, renew a prescription or communicate with your care team.     To access your existing account, please contact your [facility] or call [phone] for assistance.        Care EveryWhere ID     This is your Care EveryWhere ID. This could be used by other organizations to access your [facility] medical records  [id number]         Blood Pressure from Last 3 Encounters:   06/21/17 104/65   06/09/17 96/59   04/17/17 105/69    Weight from Last 3 Encounters:   06/21/17 84.4 kg (186 lb 1.6 oz)   06/09/17 84.6 kg (186 lb 8 oz)   04/17/17 89.4 kg (197 lb 3.2 oz)              Today, you had the following     No orders found for display       Primary Care Provider Office Phone # Fax #    Natalie Russell [phone]       [address] [place] [address] [address] [place] [id number]        Equal Access to Services     MINDI RODRIGUEZ AH: darío Wseley qaybta kaalmada adeegyada, waxay idiin hayaan adeeg  luis miguelmelodyisabella carusoLateshabrigitte ah. So [facility] [phone].    ATENCIÓN: Si bob bruce, tiene a hanley disposición servicios gratuitos de asistencia lingüística. Rl peters [phone].    We comply with applicable federal civil rights laws and [place] laws. We do not discriminate on the basis of race, color, national origin, age, disability sex, sexual orientation or gender identity.            Thank you!     Thank you for choosing [facility]  for your care. Our goal is always to provide you with excellent care. Hearing back from our patients is one way we can continue to improve our services. Please take a few minutes to complete the written survey that you may receive in the mail after your visit with us. Thank you!             Your Updated Medication List - Protect others around you: Learn how to safely use, store and throw away your medicines at www.disposemymeds.org.          This list is accurate as of: 9/14/17 11:59 PM.  Always use your most recent med list.                   Brand Name Dispense Instructions for use Diagnosis    aspirin 81 MG tablet     90 tablet    Take 1 tablet (81 mg) by mouth daily    Type 2 diabetes mellitus without complication, with long-term current use of insulin (H)       BD VIKTORIA U/F 32G X 4 MM   Generic drug:  insulin pen needle      U 6 D        blood glucose monitoring lancets     3 Box    Use to test blood sugar 4 times daily or as directed.  1 box = 102 lancets    Type II diabetes mellitus (H)       blood glucose monitoring test strip    ACCU-CHEK EDINSON PLUS    400 each    Use to test blood sugar 4 times daily    Type II diabetes mellitus (H)       carvedilol 12.5 MG tablet    COREG    180 tablet    Take 1 tablet (12.5 mg) by mouth 2 times daily (with meals)    Liver cirrhosis secondary to ESTRADA (H), Secondary esophageal varices without bleeding (H)       dapagliflozin 10 MG Tabs tablet    FARXIGA    90 tablet    Take 1 tablet (10 mg) by mouth daily    Type 2 diabetes mellitus with  hyperglycemia, with long-term current use of insulin (H)       insulin aspart 100 UNIT/ML injection    NovoLOG FLEXPEN    24 mL    1 unit per 4 Gms CHO at meals and snacks + correction scale of 1 unit per 25 mg/dL over 125. Average daily dose is 75 units.    Type II diabetes mellitus (H)       insulin degludec 200 UNIT/ML pen    TRESIBA    21 mL    Take 120 units daily.    Type 2 diabetes mellitus with hyperglycemia, with long-term current use of insulin (H)       lactulose 10 GM/15ML solution    CHRONULAC     Take 60 mLs by mouth 4 times daily        OLANZapine 2.5 MG tablet    zyPREXA    30 tablet    Take 1 tablet (2.5 mg) by mouth At Bedtime    Insomnia, unspecified type       omeprazole 40 MG capsule    priLOSEC    90 capsule    Take 1 capsule (40 mg) by mouth daily    Gastroesophageal reflux disease, esophagitis presence not specified       potassium chloride SA 20 MEQ CR tablet    K-DUR/KLOR-CON M    90 tablet    Take 1 tablet (20 mEq) by mouth daily    Hypokalemia       RA VITAMIN B-12 TR 1000 MCG Tbcr   Generic drug:  cyanocobalamin      Take 1,000 mcg by mouth daily        rifaximin 550 MG Tabs tablet    XIFAXAN    60 tablet    Take 1 tablet (550 mg) by mouth 2 times daily    Hepatic encephalopathy (H)       sildenafil 50 MG tablet    VIAGRA    12 tablet    Take 0.5-1 tablets (25-50 mg) by mouth daily as needed for erectile dysfunction Take 30 min to 4 hours before intercourse    Erectile dysfunction, unspecified erectile dysfunction type       simvastatin 20 MG tablet    ZOCOR    90 tablet    Take 1 tablet (20 mg) by mouth At Bedtime    Hyperlipidemia LDL goal <100, Type 2 diabetes mellitus without complication, with long-term current use of insulin (H)       spironolactone 25 MG tablet    ALDACTONE    90 tablet    Take 1 tablet (25 mg) by mouth daily    Other ascites       tadalafil 5 MG tablet    CIALIS    20 tablet    Take 1 tablet (5 mg) by mouth as needed for erectile dysfunction    Erectile  dysfunction due to diseases classified elsewhere       tamsulosin 0.4 MG capsule    FLOMAX    90 capsule    Take 1 capsule (0.4 mg) by mouth daily    Benign prostatic hyperplasia with lower urinary tract symptoms, unspecified morphology       THERAVITE PO      Take 1 tablet by mouth daily        VITAMIN D-3 PO      Take 2,000 Units by mouth

## 2017-09-14 NOTE — PROGRESS NOTES
Past Medical History:   Diagnosis Date     BPH (benign prostatic hyperplasia)      Cholelithiasis      Hyperlipidemia      Liver cirrhosis secondary to ESTRADA (H)      Type II diabetes mellitus (H)      Patient Active Problem List   Diagnosis     Liver cirrhosis secondary to ESTRADA (H)     Type II diabetes mellitus (H)     Advance Care Planning     Past Surgical History:   Procedure Laterality Date     ESOPHAGOSCOPY, GASTROSCOPY, DUODENOSCOPY (EGD), COMBINED N/A 11/17/2016    Procedure: COMBINED ESOPHAGOSCOPY, GASTROSCOPY, DUODENOSCOPY (EGD);  Surgeon: Santi Rosas MD;  Location:  GI     KNEE SURGERY Left      Social History     Social History     Marital status:      Spouse name: N/A     Number of children: N/A     Years of education: N/A     Occupational History     Not on file.     Social History Main Topics     Smoking status: Light Tobacco Smoker     Types: Dip, chew, snus or snuff     Smokeless tobacco: Current User      Comment: 1 tin per week     Alcohol use No      Comment: quit Sept. 1996     Drug use: No     Sexual activity: Not on file     Other Topics Concern     Not on file     Social History Narrative     No narrative on file     Family History   Problem Relation Age of Onset     Prostate Cancer Maternal Grandfather      Colon Cancer Father 60     Pancreatic Cancer Father 60     Prostate Cancer Father      Colorectal Cancer Father      Macular Degeneration Father      CANCER Father      Colorectal Cancer Maternal Grandmother      Colorectal Cancer Paternal Grandmother      CANCER Mother      Liver Disease No family hx of      Lab Results   Component Value Date    A1C 6.5 06/09/2017      Lab Results   Component Value Date    WBC 2.7 09/08/2017     Lab Results   Component Value Date    RBC 3.13 09/08/2017     Lab Results   Component Value Date    HGB 11.3 09/08/2017     Lab Results   Component Value Date    HCT 33.1 09/08/2017     No components found for: MCT  Lab Results   Component  Value Date     09/08/2017     Lab Results   Component Value Date    MCH 36.1 09/08/2017     Lab Results   Component Value Date    MCHC 34.1 09/08/2017     Lab Results   Component Value Date    RDW 13.3 09/08/2017     Lab Results   Component Value Date    PLT 37 09/08/2017     Last Basic Metabolic Panel:  Lab Results   Component Value Date     04/03/2017      Lab Results   Component Value Date    POTASSIUM 5.3 04/03/2017     Lab Results   Component Value Date    CHLORIDE 109 04/03/2017     Lab Results   Component Value Date    DEBORAH 9.5 04/03/2017     Lab Results   Component Value Date    CO2 25 04/03/2017     Lab Results   Component Value Date    BUN 24 04/03/2017     Lab Results   Component Value Date    CR 1.06 04/03/2017     Lab Results   Component Value Date     04/03/2017   SUBJECTIVE FINDINGS: A 52-year-old male who returns to clinic for diabetic foot check.  He relates he is doing well.  No ulcers or sores since we have seen him last.  He relates he is getting some numbness and tingling in his toes and some pain on the medial arch.  The pain on the medial arch is at night.  Relates no injuries, no specific relieving or aggravating factors.  He is diabetic.      OBJECTIVE FINDINGS:  DP and PT are 2/4 bilaterally.  There is no erythema, no edema, no drainage, no odor, no calor bilaterally.  He has functional hallux limitus with dorsomedial first MPJ prominence bilaterally.  He has dorsally contracted digits 2-5 bilaterally.  He relates the pain is on the medial arch along the plantar fascia when it occurs.  There are no gross tendon voids bilaterally.      ASSESSMENT AND PLAN:  Diabetes with neuropathy symptoms and plantar fasciitis versus neuropathy symptoms as well at night.  Diagnosis and treatment options discussed with him.  His toenails are doing well.  He is wearing Nike shoes and those have helped.  Spenco over-the-counter insoles advised and use discussed with him.  Advised him on  stretching.  We will see if we get him a night splint and he will return to clinic and see me in 6 months.

## 2017-09-14 NOTE — LETTER
9/14/2017       RE: Frandy Workman  [address]   [facility] [address]     Dear Colleague,    Thank you for referring your patient, Frandy Workman, to the [facility] ENDOCR[facility] at [facility]. Please see a copy of my visit note below.    Past Medical History:   Diagnosis Date     BPH (benign prostatic hyperplasia)      Cholelithiasis      Hyperlipidemia      Liver cirrhosis secondary to ESTRADA (H)      Type II diabetes mellitus (H)      Patient Active Problem List   Diagnosis     Liver cirrhosis secondary to ESTRADA (H)     Type II diabetes mellitus (H)     Advance Care Planning     Past Surgical History:   Procedure Laterality Date     ESOPHAGOSCOPY, GASTROSCOPY, DUODENOSCOPY (EGD), COMBINED N/A 11/17/2016    Procedure: COMBINED ESOPHAGOSCOPY, GASTROSCOPY, DUODENOSCOPY (EGD);  Surgeon: Santi Rosas MD;  Location:  GI     KNEE SURGERY Left      Social History     Social History     Marital status:      Spouse name: N/A     Number of children: N/A     Years of education: N/A     Occupational History     Not on file.     Social History Main Topics     Smoking status: Light Tobacco Smoker     Types: Dip, chew, snus or snuff     Smokeless tobacco: Current User      Comment: 1 tin per week     Alcohol use No      Comment: quit Sept. 1996     Drug use: No     Sexual activity: Not on file     Other Topics Concern     Not on file     Social History Narrative     No narrative on file     Family History   Problem Relation Age of Onset     Prostate Cancer Maternal Grandfather      Colon Cancer Father 60     Pancreatic Cancer Father 60     Prostate Cancer Father      Colorectal Cancer Father      Macular Degeneration Father      CANCER Father      Colorectal Cancer Maternal Grandmother      Colorectal Cancer Paternal Grandmother      CANCER Mother      Liver Disease No family hx of      Lab Results   Component Value Date    A1C 6.5 06/09/2017      Lab Results    Component Value Date    WBC 2.7 09/08/2017     Lab Results   Component Value Date    RBC 3.13 09/08/2017     Lab Results   Component Value Date    HGB 11.3 09/08/2017     Lab Results   Component Value Date    HCT 33.1 09/08/2017     No components found for: MCT  Lab Results   Component Value Date     09/08/2017     Lab Results   Component Value Date    MCH 36.1 09/08/2017     Lab Results   Component Value Date    MCHC 34.1 09/08/2017     Lab Results   Component Value Date    RDW 13.3 09/08/2017     Lab Results   Component Value Date    PLT 37 09/08/2017     Last Basic Metabolic Panel:  Lab Results   Component Value Date     04/03/2017      Lab Results   Component Value Date    POTASSIUM 5.3 04/03/2017     Lab Results   Component Value Date    CHLORIDE 109 04/03/2017     Lab Results   Component Value Date    DEBORAH 9.5 04/03/2017     Lab Results   Component Value Date    CO2 25 04/03/2017     Lab Results   Component Value Date    BUN 24 04/03/2017     Lab Results   Component Value Date    CR 1.06 04/03/2017     Lab Results   Component Value Date     04/03/2017   SUBJECTIVE FINDINGS: A 52-year-old male who returns to clinic for diabetic foot check.  He relates he is doing well.  No ulcers or sores since we have seen him last.  He relates he is getting some numbness and tingling in his toes and some pain on the medial arch.  The pain on the medial arch is at night.  Relates no injuries, no specific relieving or aggravating factors.  He is diabetic.      OBJECTIVE FINDINGS:  DP and PT are 2/4 bilaterally.  There is no erythema, no edema, no drainage, no odor, no calor bilaterally.  He has functional hallux limitus with dorsomedial first MPJ prominence bilaterally.  He has dorsally contracted digits 2-5 bilaterally.  He relates the pain is on the medial arch along the plantar fascia when it occurs.  There are no gross tendon voids bilaterally.      ASSESSMENT AND PLAN:  Diabetes with neuropathy  symptoms and plantar fasciitis versus neuropathy symptoms as well at night.  Diagnosis and treatment options discussed with him.  His toenails are doing well.  He is wearing Nike shoes and those have helped.  Spenco over-the-counter insoles advised and use discussed with him.  Advised him on stretching.  We will see if we get him a night splint and he will return to clinic and see me in 6 months.     Again, thank you for allowing me to participate in the care of your patient.      Sincerely,    Lamin Gonzalez DPM

## 2017-09-15 ENCOUNTER — MYC MEDICAL ADVICE (OUTPATIENT)
Dept: INTERNAL MEDICINE | Facility: CLINIC | Age: 52
End: 2017-09-15

## 2017-09-15 ENCOUNTER — MYC MEDICAL ADVICE (OUTPATIENT)
Dept: ENDOCRINOLOGY | Facility: CLINIC | Age: 52
End: 2017-09-15

## 2017-09-15 NOTE — LETTER
Frandy Workman  [address]   [facility] [address]  : 1965  MRN:  [id number]      2017          To whom it may concern:     I have been seeing Frandy Workman as a patient since 2016. During that time, he has been compliant with all of his medical care. I see no reason that he needs to remain in assisted care at this time, or any medical reason that cannot provide care for his daughter. He has not had any sociopathic tendencies while in my care. It would also be positive for his mental health to have more regular relationship with his daughter.       Sincerely,       Natalie Russell MD

## 2017-09-19 ENCOUNTER — TELEPHONE (OUTPATIENT)
Dept: GASTROENTEROLOGY | Facility: CLINIC | Age: 52
End: 2017-09-19

## 2017-09-19 DIAGNOSIS — N40.1 BENIGN PROSTATIC HYPERPLASIA WITH LOWER URINARY TRACT SYMPTOMS: ICD-10-CM

## 2017-09-19 DIAGNOSIS — K74.60 LIVER CIRRHOSIS SECONDARY TO NASH (H): Primary | ICD-10-CM

## 2017-09-19 DIAGNOSIS — K75.81 LIVER CIRRHOSIS SECONDARY TO NASH (H): Primary | ICD-10-CM

## 2017-09-19 DIAGNOSIS — K21.9 GASTROESOPHAGEAL REFLUX DISEASE, ESOPHAGITIS PRESENCE NOT SPECIFIED: ICD-10-CM

## 2017-09-19 RX ORDER — LACTULOSE 10 G/15ML
30 SOLUTION ORAL 4 TIMES DAILY
Qty: 7200 ML | Refills: 11 | Status: SHIPPED | OUTPATIENT
Start: 2017-09-19 | End: 2017-10-12

## 2017-09-19 NOTE — TELEPHONE ENCOUNTER
"Maris with [facility], called regarding a tooth extraction for the Pt. Maris states \"this is concerning a blood transfusion for surgery\". Please callback Maris at [phone] and ask for the [place] office.  "

## 2017-09-19 NOTE — TELEPHONE ENCOUNTER
Writer called Maris back at [facility] regarding plt transfusion. Goal would be to get PLT to 60+ with a transfusion the morning of tooth extraction. Waiting to schedule until we get the ok from Dr. Banuelos.     Will plan to touch base tomorrow with the dental clinic. Sent staff message to Dr. Banuelos regarding request.

## 2017-09-20 ENCOUNTER — OFFICE VISIT (OUTPATIENT)
Dept: OTOLARYNGOLOGY | Facility: CLINIC | Age: 52
End: 2017-09-20
Payer: MEDICARE

## 2017-09-20 ENCOUNTER — RADIANT APPOINTMENT (OUTPATIENT)
Dept: CT IMAGING | Facility: CLINIC | Age: 52
End: 2017-09-20
Attending: OTOLARYNGOLOGY
Payer: MEDICARE

## 2017-09-20 ENCOUNTER — MYC MEDICAL ADVICE (OUTPATIENT)
Dept: ENDOCRINOLOGY | Facility: CLINIC | Age: 52
End: 2017-09-20

## 2017-09-20 VITALS
DIASTOLIC BLOOD PRESSURE: 71 MMHG | WEIGHT: 188 LBS | BODY MASS INDEX: 27.85 KG/M2 | HEIGHT: 69 IN | HEART RATE: 86 BPM | SYSTOLIC BLOOD PRESSURE: 105 MMHG

## 2017-09-20 DIAGNOSIS — K11.20 SIALADENITIS: ICD-10-CM

## 2017-09-20 DIAGNOSIS — K11.8 PAROTID MASS: ICD-10-CM

## 2017-09-20 DIAGNOSIS — K11.8 PAROTID MASS: Primary | ICD-10-CM

## 2017-09-20 PROCEDURE — 99203 OFFICE O/P NEW LOW 30 MIN: CPT | Performed by: OTOLARYNGOLOGY

## 2017-09-20 PROCEDURE — 70491 CT SOFT TISSUE NECK W/DYE: CPT | Performed by: RADIOLOGY

## 2017-09-20 RX ORDER — CEPHALEXIN 500 MG/1
500 CAPSULE ORAL 3 TIMES DAILY
Qty: 30 CAPSULE | Refills: 0 | Status: SHIPPED | OUTPATIENT
Start: 2017-09-20 | End: 2017-10-16

## 2017-09-20 RX ORDER — TAMSULOSIN HYDROCHLORIDE 0.4 MG/1
0.4 CAPSULE ORAL DAILY
Qty: 90 CAPSULE | Refills: 2 | Status: SHIPPED | OUTPATIENT
Start: 2017-09-20 | End: 2017-10-16

## 2017-09-20 RX ORDER — OMEPRAZOLE 40 MG/1
40 CAPSULE, DELAYED RELEASE ORAL DAILY
Qty: 90 CAPSULE | Refills: 2 | Status: SHIPPED | OUTPATIENT
Start: 2017-09-20 | End: 2017-12-01

## 2017-09-20 RX ORDER — IOPAMIDOL 755 MG/ML
100 INJECTION, SOLUTION INTRAVASCULAR ONCE
Status: COMPLETED | OUTPATIENT
Start: 2017-09-20 | End: 2017-09-20

## 2017-09-20 RX ADMIN — IOPAMIDOL 100 ML: 755 INJECTION, SOLUTION INTRAVASCULAR at 12:54

## 2017-09-20 NOTE — TELEPHONE ENCOUNTER
omeprazole       Last Written Prescription Date:  3/26/17  Last Fill Quantity: 90,   # refills: 1  Last Office Visit : 6/9/17  Future Office visit:  12/11/17     tamsulosin        Last Written Prescription Date:  3/26/17  Last Fill Quantity: 90,   # refills: 1  BP Readings from Last 3 Encounters:   09/20/17 105/71   06/21/17 104/65   06/09/17 96/59

## 2017-09-20 NOTE — MR AVS SNAPSHOT
"              After Visit Summary   9/20/2017    Frandy Workman    MRN: [id number]           Patient Information     Date Of Birth          1965        Visit Information        Provider Department      9/20/2017 11:00 AM Washington Rueda MD [facility]        Today's Diagnoses     Parotid mass    -  1    Sialadenitis          Care Instructions    You have a parotid swelling.  Going to get a CT scan to evaluate it further.  Will give you 10 days of antibiotics.    Also: hydrate well, massage the area gently back to front, and use diet gum or \"lemon drops\" to stimulate salivation, apply gentle heat to the area for 5-10 min, 2-4 times a day.          Follow-ups after your visit        Your next 10 appointments already scheduled     Sep 20, 2017 12:30 PM CDT   CT SOFT TISSUE NECK W CONTRAST with MGCT1   [facility] ([facility])    [id number] [place] [address] [phone]           Please bring any scans or X-rays taken at other hospitals, if similar tests were done. Also bring a list of your medicines, including vitamins, minerals and over-the-counter drugs. It is safest to leave personal items at home.  Be sure to tell your doctor:   If you have any allergies.   If there s any chance you are pregnant.   If you are breastfeeding.   If you have any special needs.  You will have contrast for this exam. To prepare:   Do not eat or drink for 2 hours before your exam. If you need to take medicine, you may take it with small sips of water. (We may ask you to take liquid medicine as well.)   The day before your exam, drink extra fluids at least six 8-ounce glasses (unless your doctor tells you to restrict your fluids).  Patients over 70 or patients with diabetes or kidney problems:   If you haven t had a blood test (creatinine test) within the last 30 days, go to your clinic or Diagnostic Imaging Department for this test.  If you have " diabetes:   If your kidney function is normal, continue taking your metformin (Avandamet, Glucophage, Glucovance, Metaglip) on the day of your exam.   If your kidney function is abnormal, wait 48 hours before restarting this medicine.  Please wear loose clothing, such as a sweat suit or jogging clothes. Avoid snaps, zippers and other metal. We may ask you to undress and put on a hospital gown.  If you have any questions, please call the Imaging Department where you will have your exam.            Oct 04, 2017 10:00 AM CDT   Return Visit with Washington Rueda MD   [facility] ([facility]    [address] [place] [phone]            Oct 16, 2017 12:00 PM CDT   Lab with  LAB   [facility] ([facility])    [address] [place] [id number]   [phone]            Oct 16, 2017  1:00 PM CDT   (Arrive by 12:45 PM)   Return General Liver with Santi Dotson MD   [facility] Hepatology ([facility])    [address] [place] [id number] [phone]            Oct 16, 2017  2:00 PM CDT   (Arrive by 1:45 PM)   RETURN ENDOCRINE with Jennifer Wilocx MD   [facility] Endocrinology ([facility])    [address] [place] [id number] [phone]            Dec 11, 2017  2:00 PM CST   (Arrive by 1:45 PM)   Return Visit with Natalie Russell MD   [facility] ([facility])    [address] [place] [address]  4th [facility] [id number] [phone]            Mar 08, 2018 12:30 PM CST   (Arrive by 12:15 PM)   Return Visit with Lamin Gonzalez DPM   [facility] Endocrinology ([facility])    [address] [place] [id number] [phone]              Future tests that were ordered for you today  "    Open Future Orders        Priority Expected Expires Ordered    CT Soft Tissue Neck w Contrast Routine  9/20/2018 9/20/2017            Who to contact     If you have questions or need follow up information about today's clinic visit or your schedule please contact [facility] directly at [phone].  Normal or non-critical lab and imaging results will be communicated to you by MyChart, letter or phone within 4 business days after the clinic has received the results. If you do not hear from us within 7 days, please contact the clinic through [employer] or phone. If you have a critical or abnormal lab result, we will notify you by phone as soon as possible.  Submit refill requests through [employer] or call your pharmacy and they will forward the refill request to us. Please allow 3 business days for your refill to be completed.          Additional Information About Your Visit        MyChart Information     [employer] gives you secure access to your electronic health record. If you see a primary care provider, you can also send messages to your care team and make appointments. If you have questions, please call your primary care clinic.  If you do not have a primary care provider, please call [phone] and they will assist you.      [employer] is an electronic gateway that provides easy, online access to your medical records. With [employer], you can request a clinic appointment, read your test results, renew a prescription or communicate with your care team.     To access your existing account, please contact your [facility] or call [phone] for assistance.        Care EveryWhere ID     This is your Care EveryWhere ID. This could be used by other organizations to access your [facility] medical records  [id number]        Your Vitals Were     Pulse Height BMI (Body Mass Index)             86 1.753 m (5' 9\") 27.76 kg/m2          Blood Pressure from Last 3 Encounters: "   09/20/17 105/71   06/21/17 104/65   06/09/17 96/59    Weight from Last 3 Encounters:   09/20/17 85.3 kg (188 lb)   06/21/17 84.4 kg (186 lb 1.6 oz)   06/09/17 84.6 kg (186 lb 8 oz)                 Today's Medication Changes          These changes are accurate as of: 9/20/17 12:00 PM.  If you have any questions, ask your nurse or doctor.               Start taking these medicines.        Dose/Directions    cephALEXin 500 MG capsule   Commonly known as:  KEFLEX   Used for:  Sialadenitis   Started by:  Washington Rueda MD        Dose:  500 mg   Take 1 capsule (500 mg) by mouth 3 times daily   Quantity:  30 capsule   Refills:  0            Where to get your medicines      These medications were sent to [facility] [address] [place] [address] & [address], [place] [address]     Phone:  [phone]     cephALEXin 500 MG capsule                Primary Care Provider Office Phone # Fax #    Natalie Mitzi Andrés Russell [phone]       [address] [place] [address] 741  [facility] [address]        Equal Access to Services     MINDI RODRIGUEZ AH: Hadii curly giraldo hadasho Soomaali, waaxda luqadaha, qaybta kaalmada adeegyada, emy vuong. So [facility] [phone].    ATENCIÓN: Si habla español, tiene a hanley disposición servicios gratuitos de asistencia lingüística. Llame al [phone].    We comply with applicable federal civil rights laws and [place] laws. We do not discriminate on the basis of race, color, national origin, age, disability sex, sexual orientation or gender identity.            Thank you!     Thank you for choosing [facility]  for your care. Our goal is always to provide you with excellent care. Hearing back from our patients is one way we can continue to improve our services. Please take a few minutes to complete the written survey that you may receive in the mail after your visit with us.  Thank you!             Your Updated Medication List - Protect others around you: Learn how to safely use, store and throw away your medicines at www.disposemymeds.org.          This list is accurate as of: 9/20/17 12:00 PM.  Always use your most recent med list.                   Brand Name Dispense Instructions for use Diagnosis    aspirin 81 MG tablet     90 tablet    Take 1 tablet (81 mg) by mouth daily    Type 2 diabetes mellitus without complication, with long-term current use of insulin (H)       BD VIKTORIA U/F 32G X 4 MM   Generic drug:  insulin pen needle      U 6 D        blood glucose monitoring lancets     3 Box    Use to test blood sugar 4 times daily or as directed.  1 box = 102 lancets    Type II diabetes mellitus (H)       blood glucose monitoring test strip    ACCU-CHEK EDINSON PLUS    400 each    Use to test blood sugar 4 times daily    Type II diabetes mellitus (H)       carvedilol 12.5 MG tablet    COREG    180 tablet    Take 1 tablet (12.5 mg) by mouth 2 times daily (with meals)    Liver cirrhosis secondary to ESTRADA (H), Secondary esophageal varices without bleeding (H)       cephALEXin 500 MG capsule    KEFLEX    30 capsule    Take 1 capsule (500 mg) by mouth 3 times daily    Sialadenitis       dapagliflozin 10 MG Tabs tablet    FARXIGA    90 tablet    Take 1 tablet (10 mg) by mouth daily    Type 2 diabetes mellitus with hyperglycemia, with long-term current use of insulin (H)       insulin aspart 100 UNIT/ML injection    NovoLOG FLEXPEN    24 mL    1 unit per 4 Gms CHO at meals and snacks + correction scale of 1 unit per 25 mg/dL over 125. Average daily dose is 75 units.    Type II diabetes mellitus (H)       insulin degludec 200 UNIT/ML pen    TRESIBA    21 mL    Take 120 units daily.    Type 2 diabetes mellitus with hyperglycemia, with long-term current use of insulin (H)       lactulose 10 GM/15ML solution    CHRONULAC    7200 mL    Take 45 mLs (30 g) by mouth 4 times daily    Liver cirrhosis  secondary to ESTRADA (H)       OLANZapine 2.5 MG tablet    zyPREXA    30 tablet    Take 1 tablet (2.5 mg) by mouth At Bedtime    Insomnia, unspecified type       omeprazole 40 MG capsule    priLOSEC    90 capsule    Take 1 capsule (40 mg) by mouth daily    Gastroesophageal reflux disease, esophagitis presence not specified       potassium chloride SA 20 MEQ CR tablet    K-DUR/KLOR-CON M    90 tablet    Take 1 tablet (20 mEq) by mouth daily    Hypokalemia       RA VITAMIN B-12 TR 1000 MCG Tbcr   Generic drug:  cyanocobalamin      Take 1,000 mcg by mouth daily        rifaximin 550 MG Tabs tablet    XIFAXAN    60 tablet    Take 1 tablet (550 mg) by mouth 2 times daily    Hepatic encephalopathy (H)       simvastatin 20 MG tablet    ZOCOR    90 tablet    Take 1 tablet (20 mg) by mouth At Bedtime    Hyperlipidemia LDL goal <100, Type 2 diabetes mellitus without complication, with long-term current use of insulin (H)       spironolactone 25 MG tablet    ALDACTONE    90 tablet    Take 1 tablet (25 mg) by mouth daily    Other ascites       tamsulosin 0.4 MG capsule    FLOMAX    90 capsule    Take 1 capsule (0.4 mg) by mouth daily    Benign prostatic hyperplasia with lower urinary tract symptoms, unspecified morphology       THERAVITE PO      Take 1 tablet by mouth daily        VITAMIN D-3 PO      Take 2,000 Units by mouth

## 2017-09-20 NOTE — TELEPHONE ENCOUNTER
Writer called Maris from dental clinic regarding Dr. Banuelos's response to platelet transfusion. Will try to coordinate a plt transfusion with patient and our perfusion center.       RE: Platelet transfusion requested for tooth extraction.   Received: Yesterday       Santi Rosas MD Guidarelli, Jacob, LPN                   Yes that is fine.  1 pack of platelets should be adequate.            Previous Messages       ----- Message -----      From: Ga Mcneal LPN      Sent: 9/19/2017   3:47 PM        To: Santi Dotson MD   Subject: Platelet transfusion requested for tooth ext*     Only high priority to try catching you before you go. Patient's dental clinic is requesting a platelet transfusion the morning of his tooth extraction. Not currently scheduled for extraction just waiting on the ok from us.     Please advise on platelet transfusion (how many units etc) and I can coordinate with the dental clinic.     Thank you and enjoy your time away from clinic!   -Maximo          Patient aware and will get lab on Tuesday 9/26 @ 0900 and will come to clinic after to get a consent for blood products signed. Updated pt's dental clinic.

## 2017-09-20 NOTE — PATIENT INSTRUCTIONS
"You have a parotid swelling.  Going to get a CT scan to evaluate it further.  Will give you 10 days of antibiotics.    Also: hydrate well, massage the area gently back to front, and use diet gum or \"lemon drops\" to stimulate salivation, apply gentle heat to the area for 5-10 min, 2-4 times a day.  "

## 2017-09-20 NOTE — NURSING NOTE
"Frandy Workman's goals for this visit include:   Chief Complaint   Patient presents with     Consult     Lump on right side of face - temple area       He requests these members of his care team be copied on today's visit information: yes      PCP: Natalie Chun    Referring Provider:  Referred Self, MD  No address on file    Chief Complaint   Patient presents with     Consult     Lump on right side of face - temple area       Initial /71  Pulse 86  Ht 1.753 m (5' 9\")  Wt 85.3 kg (188 lb)  BMI 27.76 kg/m2 Estimated body mass index is 27.76 kg/(m^2) as calculated from the following:    Height as of this encounter: 1.753 m (5' 9\").    Weight as of this encounter: 85.3 kg (188 lb).  Medication Reconciliation: complete    Val Ambrose CMA (AAMA)    "

## 2017-09-25 ENCOUNTER — TELEPHONE (OUTPATIENT)
Dept: OTOLARYNGOLOGY | Facility: CLINIC | Age: 52
End: 2017-09-25

## 2017-09-25 DIAGNOSIS — K75.81 LIVER CIRRHOSIS SECONDARY TO NASH (H): Primary | ICD-10-CM

## 2017-09-25 DIAGNOSIS — K74.60 LIVER CIRRHOSIS SECONDARY TO NASH (H): Primary | ICD-10-CM

## 2017-09-25 NOTE — TELEPHONE ENCOUNTER
The patient was called and the appointment information was provided and the plan to do a fine needle biopsy in clinic this Friday with Dr Morgan. The patient expressed understanding of the appointment time and details.   Camilo Powell RN  [phone]

## 2017-09-26 ENCOUNTER — DOCUMENTATION ONLY (OUTPATIENT)
Dept: GASTROENTEROLOGY | Facility: CLINIC | Age: 52
End: 2017-09-26

## 2017-09-26 DIAGNOSIS — K75.81 LIVER CIRRHOSIS SECONDARY TO NASH (H): ICD-10-CM

## 2017-09-26 DIAGNOSIS — K74.60 LIVER CIRRHOSIS SECONDARY TO NASH (H): ICD-10-CM

## 2017-09-26 LAB
ABO + RH BLD: NORMAL
ABO + RH BLD: NORMAL
BLD GP AB SCN SERPL QL: NORMAL
BLOOD BANK CMNT PATIENT-IMP: NORMAL
SPECIMEN EXP DATE BLD: NORMAL

## 2017-09-26 NOTE — PROGRESS NOTES
Patient came in to clinic for blood consent to be signed. Dr. Kacey Mcdonnell went over consent and writer witnessed consent and signatures. Pt has had blood transfusions in the past through [facility]. Pt given copy of consent to turn in to [facility] tomorrow for the transfusion. His type and screen was drawn this morning.    Will await results of plt's post transfusion to update Dental clinic tomorrow.    Patient also wanted an updated Advanced Directive scanned into the system. Writer made a copy and will send to folder for same day scanning.

## 2017-09-27 ENCOUNTER — INFUSION THERAPY VISIT (OUTPATIENT)
Dept: INFUSION THERAPY | Facility: CLINIC | Age: 52
End: 2017-09-27
Attending: INTERNAL MEDICINE
Payer: MEDICARE

## 2017-09-27 VITALS
SYSTOLIC BLOOD PRESSURE: 112 MMHG | OXYGEN SATURATION: 100 % | TEMPERATURE: 96.6 F | DIASTOLIC BLOOD PRESSURE: 59 MMHG | HEART RATE: 85 BPM | RESPIRATION RATE: 16 BRPM

## 2017-09-27 DIAGNOSIS — K75.81 LIVER CIRRHOSIS SECONDARY TO NASH (H): Primary | ICD-10-CM

## 2017-09-27 DIAGNOSIS — K74.60 LIVER CIRRHOSIS SECONDARY TO NASH (H): Primary | ICD-10-CM

## 2017-09-27 LAB
BLD PROD TYP BPU: NORMAL
BLD PROD TYP BPU: NORMAL
BLD UNIT ID BPU: 0
BLOOD PRODUCT CODE: NORMAL
BPU ID: NORMAL
ERYTHROCYTE [DISTWIDTH] IN BLOOD BY AUTOMATED COUNT: 13.5 % (ref 10–15)
HCT VFR BLD AUTO: 30.8 % (ref 40–53)
HGB BLD-MCNC: 10.5 G/DL (ref 13.3–17.7)
MCH RBC QN AUTO: 35.4 PG (ref 26.5–33)
MCHC RBC AUTO-ENTMCNC: 34.1 G/DL (ref 31.5–36.5)
MCV RBC AUTO: 104 FL (ref 78–100)
NUM BPU REQUESTED: 1
PLATELET # BLD AUTO: 50 10E9/L (ref 150–450)
RBC # BLD AUTO: 2.97 10E12/L (ref 4.4–5.9)
TRANSFUSION STATUS PATIENT QL: NORMAL
TRANSFUSION STATUS PATIENT QL: NORMAL
WBC # BLD AUTO: 3.7 10E9/L (ref 4–11)

## 2017-09-27 PROCEDURE — 36430 TRANSFUSION BLD/BLD COMPNT: CPT

## 2017-09-27 PROCEDURE — P9037 PLATE PHERES LEUKOREDU IRRAD: HCPCS | Performed by: INTERNAL MEDICINE

## 2017-09-27 PROCEDURE — 85027 COMPLETE CBC AUTOMATED: CPT | Performed by: INTERNAL MEDICINE

## 2017-09-27 NOTE — MR AVS SNAPSHOT
After Visit Summary   9/27/2017    Frandy Workman    MRN: [id number]           Patient Information     Date Of Birth          1965        Visit Information        Provider Department      9/27/2017 10:00 AM [facility] ATC; UC SPEC INFUSION [facility]        Today's Diagnoses     Liver cirrhosis secondary to ESTRADA (H)    -  1       Follow-ups after your visit        Your next 10 appointments already scheduled     Sep 29, 2017  2:40 PM CDT   (Arrive by 2:25 PM)   New Patient Visit with Asiya Morgan MD   [facility] Nose [facility]roat ([facility])    [address] [place] [phone] [phone]            Oct 04, 2017 10:00 AM CDT   Return Visit with Washington Rueda MD   [facility] ([facility])    [address] [place] [phone] [phone]            Oct 16, 2017 12:00 PM CDT   Lab with [facility]   [facility] Lab ([facility])    [address] [place] [id number] [phone]            Oct 16, 2017  1:00 PM CDT   (Arrive by 12:45 PM)   Return General Liver with Santi Dotson MD   [facility] Hepatology ([facility])    [address] [place] [phone]            Oct 16, 2017  2:00 PM CDT   (Arrive by 1:45 PM)   RETURN ENDOCRINE with Jennifer Wilcox MD   [facility] Endocrinology ([facility])    [address] [place] [id number] [phone]            Dec 11, 2017  2:00 PM CST   (Arrive by 1:45 PM)   Return Visit with Natalie Russell MD   [facility] ([facility])    [address] [place] [phone]            Mar 08, 2018 12:30 PM CST   (Arrive by 12:15 PM)   Return  Visit with Lamin Gonzalez DPM   [facility] Endocrinology ([facility])    [address] [facility]  3rd Floor  [facility] [address] [phone]              Who to contact     If you have questions or need follow up information about today's clinic visit or your schedule please contact [facility] PROCED[facility] directly at [phone].  Normal or non-critical lab and imaging results will be communicated to you by gdgthart, letter or phone within 4 business days after the clinic has received the results. If you do not hear from us within 7 days, please contact the clinic through g[facility]thart or phone. If you have a critical or abnormal lab result, we will notify you by phone as soon as possible.  Submit refill requests through [employer] or call your pharmacy and they will forward the refill request to us. Please allow 3 business days for your refill to be completed.          Additional Information About Your Visit        gdgthart Information     [employer] gives you secure access to your electronic health record. If you see a primary care provider, you can also send messages to your care team and make appointments. If you have questions, please call your primary care clinic.  If you do not have a primary care provider, please call [phone] and they will assist you.        Care EveryWhere ID     This is your Care EveryWhere ID. This could be used by other organizations to access your [facility] medical records  [id number]        Your Vitals Were     Pulse Temperature Respirations Pulse Oximetry          85 96.6  F (35.9  C) (Oral) 16 100%         Blood Pressure from Last 3 Encounters:   09/27/17 112/59   09/20/17 105/71   06/21/17 104/65    Weight from Last 3 Encounters:   09/20/17 85.3 kg (188 lb)   06/21/17 84.4 kg (186 lb 1.6 oz)   06/09/17 84.6 kg (186 lb 8 oz)              We Performed the Following     Blood component     CBC with platelets     Platelets  prepare order unit     Transfuse platelets unit        Primary Care Provider Office Phone # Fax #    Natalie Mitzi Russell [phone]       [address] [place] [address] 741  [facility] [place] [address]        Equal Access to Services     MINDI RODRIGUEZ : Hadii curly giraldo daleo Socassidyali, waaxda luqadaha, qaybta kaalmada adeegyada, emy silvan angelicajasson grace harry vuong. So [facility] [place] [facility] [phone].    ATENCIÓN: Si habla español, tiene a hanley disposición servicios gratuitos de asistencia lingüística. Llame al [phone].    We comply with applicable federal civil rights laws and [place] laws. We do not discriminate on the basis of race, color, national origin, age, disability sex, sexual orientation or gender identity.            Thank you!     Thank you for choosing [facility] SPECIALTY AND PROCEDURE  for your care. Our goal is always to provide you with excellent care. Hearing back from our patients is one way we can continue to improve our services. Please take a few minutes to complete the written survey that you may receive in the mail after your visit with us. Thank you!             Your Updated Medication List - Protect others around you: Learn how to safely use, store and throw away your medicines at www.disposemymeds.org.          This list is accurate as of: 9/27/17  2:08 PM.  Always use your most recent med list.                   Brand Name Dispense Instructions for use Diagnosis    aspirin 81 MG tablet     90 tablet    Take 1 tablet (81 mg) by mouth daily    Type 2 diabetes mellitus without complication, with long-term current use of insulin (H)       BD VIKTORIA U/F 32G X 4 MM   Generic drug:  insulin pen needle      U 6 D        blood glucose monitoring lancets     3 Box    Use to test blood sugar 4 times daily or as directed.  1 box = 102 lancets    Type II diabetes mellitus (H)       blood glucose monitoring test strip    ACCU-CHEK EDINSON PLUS    400 each    Use to test blood  sugar 4 times daily    Type II diabetes mellitus (H)       carvedilol 12.5 MG tablet    COREG    180 tablet    Take 1 tablet (12.5 mg) by mouth 2 times daily (with meals)    Liver cirrhosis secondary to ESTRADA (H), Secondary esophageal varices without bleeding (H)       cephALEXin 500 MG capsule    KEFLEX    30 capsule    Take 1 capsule (500 mg) by mouth 3 times daily    Sialadenitis       dapagliflozin 10 MG Tabs tablet    FARXIGA    90 tablet    Take 1 tablet (10 mg) by mouth daily    Type 2 diabetes mellitus with hyperglycemia, with long-term current use of insulin (H)       insulin aspart 100 UNIT/ML injection    NovoLOG FLEXPEN    24 mL    1 unit per 4 Gms CHO at meals and snacks + correction scale of 1 unit per 25 mg/dL over 125. Average daily dose is 75 units.    Type II diabetes mellitus (H)       insulin degludec 200 UNIT/ML pen    TRESIBA    21 mL    Take 120 units daily.    Type 2 diabetes mellitus with hyperglycemia, with long-term current use of insulin (H)       lactulose 10 GM/15ML solution    CHRONULAC    7200 mL    Take 45 mLs (30 g) by mouth 4 times daily    Liver cirrhosis secondary to ESTRADA (H)       OLANZapine 2.5 MG tablet    zyPREXA    30 tablet    Take 1 tablet (2.5 mg) by mouth At Bedtime    Insomnia, unspecified type       omeprazole 40 MG capsule    priLOSEC    90 capsule    Take 1 capsule (40 mg) by mouth daily    Gastroesophageal reflux disease, esophagitis presence not specified       potassium chloride SA 20 MEQ CR tablet    K-DUR/KLOR-CON M    90 tablet    Take 1 tablet (20 mEq) by mouth daily    Hypokalemia       RA VITAMIN B-12 TR 1000 MCG Tbcr   Generic drug:  cyanocobalamin      Take 1,000 mcg by mouth daily        rifaximin 550 MG Tabs tablet    XIFAXAN    60 tablet    Take 1 tablet (550 mg) by mouth 2 times daily    Hepatic encephalopathy (H)       simvastatin 20 MG tablet    ZOCOR    90 tablet    Take 1 tablet (20 mg) by mouth At Bedtime    Hyperlipidemia LDL goal <100, Type 2  diabetes mellitus without complication, with long-term current use of insulin (H)       spironolactone 25 MG tablet    ALDACTONE    90 tablet    Take 1 tablet (25 mg) by mouth daily    Other ascites       tamsulosin 0.4 MG capsule    FLOMAX    90 capsule    Take 1 capsule (0.4 mg) by mouth daily    Benign prostatic hyperplasia with lower urinary tract symptoms       THERAVITE PO      Take 1 tablet by mouth daily        VITAMIN D-3 PO      Take 2,000 Units by mouth

## 2017-09-27 NOTE — PROGRESS NOTES
Writer spoke with Maris regarding patient's platelet's (50) and they were high enough for the extraction. No further actions needed at this time.

## 2017-09-27 NOTE — PROGRESS NOTES
Blood Product Infusion Nursing Note    Frandy Workman comes to [facility] today for a platelet transfusion. Pt's vitals recorded and entered into flow sheet. Medications recorded on MAR. Access recorded in flow sheet.    Type and Crossmatch completed on: 9/26/17    All pertinent labs reviewed prior to infusion: YES    Blood Product order verified and double checked for accuracy: YES  2nd : Mary Beth Irvin    Date of consent or authorization: 9/26/17    Progress Note    Vitals were reviewed     Patient tolerated the procedure well    Note:   Pt has dental procedure scheduled this afternoon and needs platelet level to be over 50 to proceed with procedure. 1 unit platelets given over 1.25 hours. Platelet level rechecked post transfusion. Platelet recheck was 50. Pt discharged to dental appointment.    Discharge Plan    Discharge instructions reviewed with patient: YES  Discharge papers printed and given to patient: YES  Patient/Representative verbalized understanding, all questions answered: YES    Discharged from unit at 1240 with whom: self to dental appointment.    Sara Killian RN

## 2017-09-28 DIAGNOSIS — Z79.4 TYPE 2 DIABETES MELLITUS WITHOUT COMPLICATION, WITH LONG-TERM CURRENT USE OF INSULIN (H): ICD-10-CM

## 2017-09-28 DIAGNOSIS — E11.9 TYPE 2 DIABETES MELLITUS WITHOUT COMPLICATION, WITH LONG-TERM CURRENT USE OF INSULIN (H): ICD-10-CM

## 2017-09-28 DIAGNOSIS — E78.5 HYPERLIPIDEMIA LDL GOAL <100: ICD-10-CM

## 2017-09-29 ENCOUNTER — OFFICE VISIT (OUTPATIENT)
Dept: OTOLARYNGOLOGY | Facility: CLINIC | Age: 52
End: 2017-09-29

## 2017-09-29 VITALS
SYSTOLIC BLOOD PRESSURE: 123 MMHG | WEIGHT: 188 LBS | OXYGEN SATURATION: 98 % | TEMPERATURE: 98.4 F | DIASTOLIC BLOOD PRESSURE: 67 MMHG | HEART RATE: 84 BPM | BODY MASS INDEX: 27.85 KG/M2 | HEIGHT: 69 IN

## 2017-09-29 DIAGNOSIS — K11.8 PAROTID MASS: Primary | ICD-10-CM

## 2017-09-29 PROCEDURE — 00000155 ZZHCL STATISTIC H-CELL BLOCK W/STAIN: Performed by: OTOLARYNGOLOGY

## 2017-09-29 PROCEDURE — 88172 CYTP DX EVAL FNA 1ST EA SITE: CPT | Performed by: OTOLARYNGOLOGY

## 2017-09-29 PROCEDURE — 88305 TISSUE EXAM BY PATHOLOGIST: CPT | Performed by: OTOLARYNGOLOGY

## 2017-09-29 PROCEDURE — 88173 CYTOPATH EVAL FNA REPORT: CPT | Performed by: OTOLARYNGOLOGY

## 2017-09-29 RX ORDER — SIMVASTATIN 20 MG
20 TABLET ORAL AT BEDTIME
Qty: 90 TABLET | Refills: 3 | Status: SHIPPED | OUTPATIENT
Start: 2017-09-29 | End: 2017-10-16 | Stop reason: DRUGHIGH

## 2017-09-29 RX ORDER — AMOXICILLIN 500 MG/1
CAPSULE ORAL
Refills: 0 | COMMUNITY
Start: 2017-09-27 | End: 2017-10-16

## 2017-09-29 NOTE — PROGRESS NOTES
Dear Dr. Rueda:    I had the pleasure of meeting Frandy Workman in consultation today at the [facility] at your request.     History of Present Illness:   Frandy Workman is a 52-year-old gentleman who is referred for evaluation of a parotid lesion. He says the mass first appeared about 8 weeks ago. He says he has a significant dental history with poor dentition and around the time he had a broken tooth. He was seen by the [place] dentists and his tooth was pulled after several weeks. He said following the tooth extraction the mass increased in size. He talked to the dentist but they did not think the tooth and the cheek mass were related to one another. The mass is extremely painful and causes a sharp pain that goes from the mass itself up into his temple. He is having difficulty sleeping because of the pain and because he normally sleeps on the right side where the mass is located. He did notice for about a day now he's had some pain in the right eye and he feels like his right eye is puffy. He has not had any episodes of facial weakness or numbness. He says he is not having difficulty with his chewing. He has tried a course of lemon drops without any improvement. He thinks that since this all started he has had decrease in his hearing on the right side. He does have a history of skin cancer in the right temporal region but he thinks that this was just treated with cryotherapy (though there is a scar present). He does have a significant history of sun exposure.    Family history: No family history of head and neck cancers    Social history: He was previously an alcohol user but quit in . He chews tobacco about 6 ounces per week. His wife is . He has a daughter here from [place].    Past medical history: Liver failure in  with the highest MELD score of 42. He is applying for a liver transplant in October. He has history of thrombocytopenia and his most  recent transfusion was on Wednesday, with platelets at 50. Diabetes. He says in the past he has been told he is bipolar but thinks this is partially been confused by his liver failure, does have depression.    Past surgical history: Knee arthroscopy    MEDICATIONS:     Current Outpatient Prescriptions   Medication Sig Dispense Refill     simvastatin (ZOCOR) 20 MG tablet Take 1 tablet (20 mg) by mouth At Bedtime 90 tablet 3     aspirin (ASPIRIN LOW DOSE) 81 MG EC tablet Take 1 tablet (81 mg) by mouth daily 90 tablet 3     amoxicillin-clavulanate (AUGMENTIN) 875-125 MG per tablet Take 1 tablet by mouth every 12 hours for 14 days 28 tablet 0     omeprazole (PRILOSEC) 40 MG capsule Take 1 capsule (40 mg) by mouth daily 90 capsule 2     tamsulosin (FLOMAX) 0.4 MG capsule Take 1 capsule (0.4 mg) by mouth daily 90 capsule 2     cephALEXin (KEFLEX) 500 MG capsule Take 1 capsule (500 mg) by mouth 3 times daily 30 capsule 0     lactulose (CHRONULAC) 10 GM/15ML solution Take 45 mLs (30 g) by mouth 4 times daily 7200 mL 11     dapagliflozin (FARXIGA) 10 MG TABS tablet Take 1 tablet (10 mg) by mouth daily 90 tablet 3     OLANZapine (ZYPREXA) 2.5 MG tablet Take 1 tablet (2.5 mg) by mouth At Bedtime 30 tablet 5     potassium chloride SA (K-DUR/KLOR-CON M) 20 MEQ CR tablet Take 1 tablet (20 mEq) by mouth daily 90 tablet 3     Cholecalciferol (VITAMIN D-3 PO) Take 2,000 Units by mouth       insulin degludec (TRESIBA) 200 UNIT/ML pen Take 120 units daily. 21 mL 11     spironolactone (ALDACTONE) 25 MG tablet Take 1 tablet (25 mg) by mouth daily 90 tablet 3     rifaximin (XIFAXAN) 550 MG TABS tablet Take 1 tablet (550 mg) by mouth 2 times daily 60 tablet 11     insulin aspart (NOVOLOG FLEXPEN) 100 UNIT/ML injection 1 unit per 4 Gms CHO at meals and snacks + correction scale of 1 unit per 25 mg/dL over 125. Average daily dose is 75 units. 24 mL 11     blood glucose monitoring (ACCU-CHEK EDINSON PLUS) test strip Use to test blood sugar 4  times daily 400 each 3     blood glucose monitoring (ACCU-CHEK FASTCLIX) lancets Use to test blood sugar 4 times daily or as directed.  1 box = 102 lancets 3 Box 3     carvedilol (COREG) 12.5 MG tablet Take 1 tablet (12.5 mg) by mouth 2 times daily (with meals) 180 tablet 3     cyanocobalamin (RA VITAMIN B-12 TR) 1000 MCG TBCR Take 1,000 mcg by mouth daily       insulin pen needle (BD VIKTORIA U/F) 32G X 4 MM U 6 D       Multiple Vitamin (THERAVITE PO) Take 1 tablet by mouth daily       amoxicillin (AMOXIL) 500 MG capsule TK ONE C PO TID TAT  0       ALLERGIES:    Allergies   Allergen Reactions     Codeine Other (See Comments)     Cannot take due to liver         HABITS/SOCIAL HISTORY:   Chewing tobacco use  Quit alcohol in   Wife   Daughter lives in [place]    Social History     Social History     Marital status:      Spouse name: N/A     Number of children: N/A     Years of education: N/A     Occupational History     Not on file.     Social History Main Topics     Smoking status: Current Some Day Smoker     Packs/day: 6.00     Years: 30.00     Types: Dip, chew, snus or snuff     Start date: 2016     Last attempt to quit: 5/15/2013     Smokeless tobacco: Current User      Comment: 1 tin per week     Alcohol use No      Comment: quit 1996     Drug use: No     Sexual activity: Not Currently     Partners: Female     Birth control/ protection: Condom     Other Topics Concern     Not on file     Social History Narrative       PAST MEDICAL HISTORY:   Past Medical History:   Diagnosis Date     Anemia 2013    Low blood plates current is 37     BPH (benign prostatic hyperplasia)      Cholelithiasis      Conductive hearing loss 2017    Have a lump on my right side of my face.  Had wax discharge     Depressive disorder 1986    Suffer effects throughout life     Gastroesophageal reflux disease 2014    Being treated with Prilosac     Hepatitis 2014    Diagnosed with schrosis ESTRADA in .   "Suffer from hepatatie     Hyperlipidemia      Liver cirrhosis secondary to ESTRADA (H)      Type II diabetes mellitus (H)         PAST SURGICAL HISTORY:   Past Surgical History:   Procedure Laterality Date     ESOPHAGOSCOPY, GASTROSCOPY, DUODENOSCOPY (EGD), COMBINED N/A 2016    Procedure: COMBINED ESOPHAGOSCOPY, GASTROSCOPY, DUODENOSCOPY (EGD);  Surgeon: Santi Rosas MD;  Location:  GI     KNEE SURGERY Left        FAMILY HISTORY:    Family History   Problem Relation Age of Onset     Prostate Cancer Maternal Grandfather      Substance Abuse Maternal Grandfather      Alcohol     Colon Cancer Father 60     Pancreatic Cancer Father 60     Prostate Cancer Father      Colorectal Cancer Father      Macular Degeneration Father      CANCER Father      Colorectal Cancer Maternal Grandmother      CANCER Maternal Grandmother      Substance Abuse Maternal Grandmother      Alcohol     Colorectal Cancer Paternal Grandmother      CANCER Mother      DIABETES Mother       3/2016     CEREBROVASCULAR DISEASE Mother      Passed away in Feb of this year, 80 years old.     Thyroid Disease Mother      Depression Mother      Asthma Sister      Had since birth     Thyroid Disease Sister      Depression Sister      Liver Disease No family hx of        REVIEW OF SYSTEMS:  12 point ROS was negative other than the symptoms noted above in the HPI.  Patient Supplied Answers to Review of Systems  [facility] ENT ROS 2017   Constitutional Problems with sleep   Neurology Numbness, Headache   Eyes Visual loss   Ears, Nose, Throat Ear pain   Musculoskeletal Sore or stiff joints, Back pain   Allergy/Immunology Allergies or hay fever   Hematologic Bleeding problems   Endocrine Thirst         PHYSICAL EXAMINATION:   /67  Pulse 84  Temp 98.4  F (36.9  C)  Ht 1.753 m (5' 9\")  Wt 85.3 kg (188 lb)  SpO2 98%  BMI 27.76 kg/m2  Appearance:   normal; NAD, age-appropriate appearance, well-developed, normal habitus   Communication:   " normal; communicates verbally, normal voice quality   Head/Face:   inspection -  3 cm visible mass in the right temporal region overlying the zygoma   Salivary glands -  there is a firm mass that is present in the right temporal/parotid region that overlies the zygoma with measurements of about 3-1/2 cm in diameter without any overlying skin changes that is extremely tender to palpation   Facial strength -  Normal and symmetric bilateral; H/B I/VI   Skin:  normal, no rash   Ocular Motility:  normal occular movements   Ears:  auricle (AD) -  normal  EAC (AD) -  normal  TM (AD) -  Normal, no effusion  auricle (AS) -  there is a crusted lesion on the helix  EAC (AS) -  normal  TM (AS) -  Normal, no effusion  Normal clinical speech reception   Nose:  Ext. inspection -  Normal   Oral Cavity:  lips -  Normal mucosa, oral competence, and stoma size   No trismus   Teeth are in poor repair with multiple caries and broken teeth   Hard palate, buccal, floor of mouth mucosa normal   Tongue - normal movement, no lesions, normal sensation   Oropharynx:  mucosa -  Normal, no lesions  soft palate -  Normal, no lesions, no asymmetry, normal elevation  tonsils -  Normal, no exudates, no abnormal lesions, symmetric   Neck: No visible mass or asymmetry   Normal palpation, no tenderness, no tracheal deviation  Normal range of motion   Lymphatic:  no abnormal nodes   Cardiovascular:  warm, pink, well-perfused extremities without swelling, tenderness, or edema   Respiratory:  Normal respiratory effort, no stridor   Neuro/Psych.:  mood/affect -  normal  mental status -  normal  cranial nerves -  normal          RESULTS REVIEWED:   I independently reviewed the CT scan images which show an approximately 3 cm mass in the right parotid which overlies the zygoma with poorly defined margins without any evidence bony erosion. There is no adenopathy.    Findings:   3.1 x 2.0 x 3.3 cm solid predominantly peripherally enhancing mass in  the superior  aspect of the right parotid gland, which demonstrates  ill-defined hazy margins. The mass overlies the right zygomatic arch,  though there is no evidence of abnormal bony sclerosis or erosion. No  suspicious cervical lymphadenopathy.     Mucosal spaces are clear. Left parotid gland and submandibular glands  are normal. The thyroid gland is normal. Patent major cervical  vasculature. Atherosclerotic calcifications. Lung apices are clear.  Paranasal sinuses and mastoid air cells are clear. Orbits and  visualized intercranial contents are unremarkable.         Impression:  1. Solid right parotid mass with infiltrative margins, malignancy  until proven otherwise. Differential includes primary salivary gland  malignancy or metastatic intraparotid lymph node. Recommend soft  tissue sampling.  2. No cervical adenopathy.    IMPRESSION AND PLAN:   Frandy Workman is a 52-year-old gentleman with a right-sided parotid mass. I discussed with him that I am concerned that this is a mass within the parotid and not related to an inflammatory process, and likely unrelated to his recent dental work. We did have pathology perform an FNA in clinic today which unfortunately only showed inflammatory cells. I discussed with the patient that we will treat him with a course of antibiotics for about 10 days and in the meantime await the results of the FNA. If the FNA just shows inflammatory cells on final cytology I would like to proceed with a ultrasound-guided FNA (vs core) to get further information. I am concerned that this is a malignancy within the parotid gland. This certainly could be a metastatic lesion from his previous skin cancer on his forehead or a primary parotid lesion. Depending on the results of the biopsy I will see him back in clinic. We also discussed that he has a crusted lesion on his left auricle which he says has been there for a few weeks as he continually picks at the area. I removed the crusting and recommended  that he treat this with Aquaphor. I told him that if the lesion persists when he returns to clinic in about a week or so we will plan on performing a biopsy of this area.    Thank you very much for the opportunity to participate in the care of your patient.      Asiya Morgan M.D.  Otolaryngology- Head & Neck Surgery        I spent a total of 25 minutes face-to-face with Frandy Workman during today s office visit. Over 50% of this time was spent counseling the patient and/or coordinating care regarding the mass.            CC:  Natalie Russell MD  [address] [facility] [address]  [facility] [address]  Sheba Rueda MD  Otolaryngology/Head & Neck Surgery  [facility] 396

## 2017-09-29 NOTE — MR AVS SNAPSHOT
After Visit Summary   9/29/2017    Frandy Workman    MRN: [id number]           Patient Information     Date Of Birth          1965        Visit Information        Provider Department      9/29/2017 2:40 PM Asiya Morgan MD [facility] Ear Nose and Throat        Today's Diagnoses     Parotid mass    -  1      Care Instructions    1.  You were seen in the ENT Clinic today by Dr. Morgan.  If you have any questions or concerns after your appointment, please call [phone].  Press option #1 for scheduling related needs.  Press option #3 for Nurse advice.  2.  Plan is to call you with the final results of your pathology. If we need to do another fine needle aspiration, this will be done under ultrasound guidance.   Pathology results usually take 5-7 days to process.  3.  Please stop taking your Amoxicillin and start Augmentin. This was sent to your local pharmacy.              Follow-ups after your visit        Your next 10 appointments already scheduled     Oct 04, 2017 10:00 AM CDT   Return Visit with Washington Rueda MD   [facility] ([facility]    [address] [place] [address] [phone]            Oct 16, 2017 12:00 PM CDT   Lab with  LAB   [facility] Lab ([facility])    [address] [place] [address] [phone]            Oct 16, 2017  1:00 PM CDT   (Arrive by 12:45 PM)   Return General Liver with Santi oDtson MD   [facility] Hepatology ([facility])    [address] [place] [address] [phone]            Oct 16, 2017  2:00 PM CDT   (Arrive by 1:45 PM)   RETURN ENDOCRINE with Jennifer Wilcox MD   [facility] Endocrinology ([facility])    [address] [place] [address] [phone]            Dec 11, 2017  2:00 PM CST   (Arrive by 1:45 PM)    Return Visit with Natalie Russell MD   [facility] ([facility])    [address]  4th Floor  [facility] [address] [phone]            Mar 08, 2018 12:30 PM CST   (Arrive by 12:15 PM)   Return Visit with Lamin Gonzalez DPM   [facility] Endocrinology ([facility])    [address]  3rd Floor  [facility] [address] [phone]              Who to contact     Please call your clinic at [phone] to:    Ask questions about your health    Make or cancel appointments    Discuss your medicines    Learn about your test results    Speak to your doctor   If you have compliments or concerns about an experience at your clinic, or if you wish to file a complaint, please contact [facility] Patient Relations at [phone] or email us at [email or web address]         Additional Information About Your Visit        [address] Information     [employer] gives you secure access to your electronic health record. If you see a primary care provider, you can also send messages to your care team and make appointments. If you have questions, please call your primary care clinic.  If you do not have a primary care provider, please call [phone] and they will assist you.      [employer] is an electronic gateway that provides easy, online access to your medical records. With [employer], you can request a clinic appointment, read your test results, renew a prescription or communicate with your care team.     To access your existing account, please contact your [facility] Clinic or call [phone] for assistance.        Care EveryWhere ID     This is your Care EveryWhere ID. This could be used by other organizations to access your [place] medical records  [id number]        Your Vitals Were     Pulse Temperature Height Pulse Oximetry BMI (Body Mass Index)       84 98.4  F  "(36.9  C) 1.753 m (5' 9\") 98% 27.76 kg/m2        Blood Pressure from Last 3 Encounters:   09/29/17 123/67   09/27/17 112/59   09/20/17 105/71    Weight from Last 3 Encounters:   09/29/17 85.3 kg (188 lb)   09/20/17 85.3 kg (188 lb)   06/21/17 84.4 kg (186 lb 1.6 oz)              We Performed the Following     Fine needle aspiration     IMAGESTREAM RECORDING ORDER          Today's Medication Changes          These changes are accurate as of: 9/29/17 11:59 PM.  If you have any questions, ask your nurse or doctor.               Start taking these medicines.        Dose/Directions    amoxicillin-clavulanate 875-125 MG per tablet   Commonly known as:  AUGMENTIN   Used for:  Parotid mass   Started by:  Asiya Morgan MD        Dose:  1 tablet   Take 1 tablet by mouth every 12 hours for 14 days   Quantity:  28 tablet   Refills:  0            Where to get your medicines      These medications were sent to [facility] [address] [place] [address] & [address], [place] [address]     Phone:  [phone]     amoxicillin-clavulanate 875-125 MG per tablet                Primary Care Provider Office Phone # Fax #    Natalie Mitzi Andrés Russell [phone]       [address] [place] [address] [address] [place] [id number]        Equal Access to Services     [facility] Center[facility] AH: Hadii curly huntero Somichael, waaxda luqadaha, qaybta kaalmada adeegyaluis, waxay lincoln vuong. So [facility] [phone].    ATENCIÓN: Si habla español, tiene a hanley disposición servicios gratuitos de asistencia lingüística. Llame al [phone].    We comply with applicable federal civil rights laws and [place] laws. We do not discriminate on the basis of race, color, national origin, age, disability, sex, sexual orientation, or gender identity.            Thank you!     Thank you for choosing [facility] EAR NOSE AND THROAT  for your care. Our goal is always to provide you with " excellent care. Hearing back from our patients is one way we can continue to improve our services. Please take a few minutes to complete the written survey that you may receive in the mail after your visit with us. Thank you!             Your Updated Medication List - Protect others around you: Learn how to safely use, store and throw away your medicines at www.disposemymeds.org.          This list is accurate as of: 9/29/17 11:59 PM.  Always use your most recent med list.                   Brand Name Dispense Instructions for use Diagnosis    amoxicillin 500 MG capsule    AMOXIL     TK ONE C PO TID TAT        amoxicillin-clavulanate 875-125 MG per tablet    AUGMENTIN    28 tablet    Take 1 tablet by mouth every 12 hours for 14 days    Parotid mass       aspirin 81 MG EC tablet    ASPIRIN LOW DOSE    90 tablet    Take 1 tablet (81 mg) by mouth daily    Type 2 diabetes mellitus without complication, with long-term current use of insulin (H)       BD VIKTORIA U/F 32G X 4 MM   Generic drug:  insulin pen needle      U 6 D        blood glucose monitoring lancets     3 Box    Use to test blood sugar 4 times daily or as directed.  1 box = 102 lancets    Type II diabetes mellitus (H)       blood glucose monitoring test strip    ACCU-CHEK EDINSON PLUS    400 each    Use to test blood sugar 4 times daily    Type II diabetes mellitus (H)       carvedilol 12.5 MG tablet    COREG    180 tablet    Take 1 tablet (12.5 mg) by mouth 2 times daily (with meals)    Liver cirrhosis secondary to ESTRADA (H), Secondary esophageal varices without bleeding (H)       cephALEXin 500 MG capsule    KEFLEX    30 capsule    Take 1 capsule (500 mg) by mouth 3 times daily    Sialadenitis       dapagliflozin 10 MG Tabs tablet    FARXIGA    90 tablet    Take 1 tablet (10 mg) by mouth daily    Type 2 diabetes mellitus with hyperglycemia, with long-term current use of insulin (H)       insulin aspart 100 UNIT/ML injection    NovoLOG FLEXPEN    24 mL    1 unit  per 4 Gms CHO at meals and snacks + correction scale of 1 unit per 25 mg/dL over 125. Average daily dose is 75 units.    Type II diabetes mellitus (H)       insulin degludec 200 UNIT/ML pen    TRESIBA    21 mL    Take 120 units daily.    Type 2 diabetes mellitus with hyperglycemia, with long-term current use of insulin (H)       lactulose 10 GM/15ML solution    CHRONULAC    7200 mL    Take 45 mLs (30 g) by mouth 4 times daily    Liver cirrhosis secondary to ESTRADA (H)       OLANZapine 2.5 MG tablet    zyPREXA    30 tablet    Take 1 tablet (2.5 mg) by mouth At Bedtime    Insomnia, unspecified type       omeprazole 40 MG capsule    priLOSEC    90 capsule    Take 1 capsule (40 mg) by mouth daily    Gastroesophageal reflux disease, esophagitis presence not specified       potassium chloride SA 20 MEQ CR tablet    K-DUR/KLOR-CON M    90 tablet    Take 1 tablet (20 mEq) by mouth daily    Hypokalemia       RA VITAMIN B-12 TR 1000 MCG Tbcr   Generic drug:  cyanocobalamin      Take 1,000 mcg by mouth daily        rifaximin 550 MG Tabs tablet    XIFAXAN    60 tablet    Take 1 tablet (550 mg) by mouth 2 times daily    Hepatic encephalopathy (H)       simvastatin 20 MG tablet    ZOCOR    90 tablet    Take 1 tablet (20 mg) by mouth At Bedtime    Hyperlipidemia LDL goal <100, Type 2 diabetes mellitus without complication, with long-term current use of insulin (H)       spironolactone 25 MG tablet    ALDACTONE    90 tablet    Take 1 tablet (25 mg) by mouth daily    Other ascites       tamsulosin 0.4 MG capsule    FLOMAX    90 capsule    Take 1 capsule (0.4 mg) by mouth daily    Benign prostatic hyperplasia with lower urinary tract symptoms       THERAVITE PO      Take 1 tablet by mouth daily        VITAMIN D-3 PO      Take 2,000 Units by mouth

## 2017-09-29 NOTE — TELEPHONE ENCOUNTER
simvastatin        Last Written Prescription Date:  10/25/16  Last Fill Quantity: 90,   # refills: 3  Last Office Visit : 6/9/17  Future Office visit:  12/11/17    aspirin 81 MG      Last Written Prescription Date:  10/25/16  Last Fill Quantity: 90,   # refills: 3

## 2017-09-29 NOTE — PATIENT INSTRUCTIONS
1.  You were seen in the ENT Clinic today by Dr. Morgan.  If you have any questions or concerns after your appointment, please call [phone].  Press option #1 for scheduling related needs.  Press option #3 for Nurse advice.  2.  Plan is to call you with the final results of your pathology. If we need to do another fine needle aspiration, this will be done under ultrasound guidance.   Pathology results usually take 5-7 days to process.  3.  Please stop taking your Amoxicillin and start Augmentin. This was sent to your local pharmacy.

## 2017-09-29 NOTE — LETTER
9/29/2017       RE: Frandy Workman  [address]   [facility] [address]     Dear Colleague,    Thank you for referring your patient, Frandy Workman, to the [facility] EAR NOSE AND THROAT at [facility]. Please see a copy of my visit note below.    Dear Dr. Rueda:    I had the pleasure of meeting Frandy Workman in consultation today at the [facility] at your request.     History of Present Illness:   Frandy Workman is a 52-year-old gentleman who is referred for evaluation of a parotid lesion. He says the mass first appeared about 8 weeks ago. He says he has a significant dental history with poor dentition and around the time he had a broken tooth. He was seen by the [facility] and his tooth was pulled after several weeks. He said following the tooth extraction the mass increased in size. He talked to the dentist but they did not think the tooth and the cheek mass were related to one another. The mass is extremely painful and causes a sharp pain that goes from the mass itself up into his temple. He is having difficulty sleeping because of the pain and because he normally sleeps on the right side where the mass is located. He did notice for about a day now he's had some pain in the right eye and he feels like his right eye is puffy. He has not had any episodes of facial weakness or numbness. He says he is not having difficulty with his chewing. He has tried a course of lemon drops without any improvement. He thinks that since this all started he has had decrease in his hearing on the right side. He does have a history of skin cancer in the right temporal region but he thinks that this was just treated with cryotherapy (though there is a scar present). He does have a significant history of sun exposure.    Family history: No family history of head and neck cancers    Social history: He was previously an alcohol user but quit in  . He chews tobacco about 6 ounces per week. His wife is . He has a daughter here from [place].    Past medical history: Liver failure in  with the highest MELD score of 42. He is applying for a liver transplant in October. He has history of thrombocytopenia and his most recent transfusion was on Wednesday, with platelets at 50. Diabetes. He says in the past he has been told he is bipolar but thinks this is partially been confused by his liver failure, does have depression.    Past surgical history: Knee arthroscopy    MEDICATIONS:     Current Outpatient Prescriptions   Medication Sig Dispense Refill     simvastatin (ZOCOR) 20 MG tablet Take 1 tablet (20 mg) by mouth At Bedtime 90 tablet 3     aspirin (ASPIRIN LOW DOSE) 81 MG EC tablet Take 1 tablet (81 mg) by mouth daily 90 tablet 3     amoxicillin-clavulanate (AUGMENTIN) 875-125 MG per tablet Take 1 tablet by mouth every 12 hours for 14 days 28 tablet 0     omeprazole (PRILOSEC) 40 MG capsule Take 1 capsule (40 mg) by mouth daily 90 capsule 2     tamsulosin (FLOMAX) 0.4 MG capsule Take 1 capsule (0.4 mg) by mouth daily 90 capsule 2     cephALEXin (KEFLEX) 500 MG capsule Take 1 capsule (500 mg) by mouth 3 times daily 30 capsule 0     lactulose (CHRONULAC) 10 GM/15ML solution Take 45 mLs (30 g) by mouth 4 times daily 7200 mL 11     dapagliflozin (FARXIGA) 10 MG TABS tablet Take 1 tablet (10 mg) by mouth daily 90 tablet 3     OLANZapine (ZYPREXA) 2.5 MG tablet Take 1 tablet (2.5 mg) by mouth At Bedtime 30 tablet 5     potassium chloride SA (K-DUR/KLOR-CON M) 20 MEQ CR tablet Take 1 tablet (20 mEq) by mouth daily 90 tablet 3     Cholecalciferol (VITAMIN D-3 PO) Take 2,000 Units by mouth       insulin degludec (TRESIBA) 200 UNIT/ML pen Take 120 units daily. 21 mL 11     spironolactone (ALDACTONE) 25 MG tablet Take 1 tablet (25 mg) by mouth daily 90 tablet 3     rifaximin (XIFAXAN) 550 MG TABS tablet Take 1 tablet (550 mg) by mouth 2 times daily 60  tablet 11     insulin aspart (NOVOLOG FLEXPEN) 100 UNIT/ML injection 1 unit per 4 Gms CHO at meals and snacks + correction scale of 1 unit per 25 mg/dL over 125. Average daily dose is 75 units. 24 mL 11     blood glucose monitoring (ACCU-CHEK EDINSON PLUS) test strip Use to test blood sugar 4 times daily 400 each 3     blood glucose monitoring (ACCU-CHEK FASTCLIX) lancets Use to test blood sugar 4 times daily or as directed.  1 box = 102 lancets 3 Box 3     carvedilol (COREG) 12.5 MG tablet Take 1 tablet (12.5 mg) by mouth 2 times daily (with meals) 180 tablet 3     cyanocobalamin (RA VITAMIN B-12 TR) 1000 MCG TBCR Take 1,000 mcg by mouth daily       insulin pen needle (BD VIKTORIA U/F) 32G X 4 MM U 6 D       Multiple Vitamin (THERAVITE PO) Take 1 tablet by mouth daily       amoxicillin (AMOXIL) 500 MG capsule TK ONE C PO TID TAT  0       ALLERGIES:    Allergies   Allergen Reactions     Codeine Other (See Comments)     Cannot take due to liver         HABITS/SOCIAL HISTORY:   Chewing tobacco use  Quit alcohol in   Wife   Daughter lives in [place]    Social History     Social History     Marital status:      Spouse name: N/A     Number of children: N/A     Years of education: N/A     Occupational History     Not on file.     Social History Main Topics     Smoking status: Current Some Day Smoker     Packs/day: 6.00     Years: 30.00     Types: Dip, chew, snus or snuff     Start date: 2016     Last attempt to quit: 5/15/2013     Smokeless tobacco: Current User      Comment: 1 tin per week     Alcohol use No      Comment: quit 1996     Drug use: No     Sexual activity: Not Currently     Partners: Female     Birth control/ protection: Condom     Other Topics Concern     Not on file     Social History Narrative       PAST MEDICAL HISTORY:   Past Medical History:   Diagnosis Date     Anemia 2013    Low blood plates current is 37     BPH (benign prostatic hyperplasia)      Cholelithiasis       Conductive hearing loss 2017    Have a lump on my right side of my face.  Had wax discharge     Depressive disorder     Suffer effects throughout life     Gastroesophageal reflux disease 2014    Being treated with Prilosac     Hepatitis     Diagnosed with schrosis ESTRADA in .  Suffer from hepatatie     Hyperlipidemia      Liver cirrhosis secondary to ESTRADA (H)      Type II diabetes mellitus (H)         PAST SURGICAL HISTORY:   Past Surgical History:   Procedure Laterality Date     ESOPHAGOSCOPY, GASTROSCOPY, DUODENOSCOPY (EGD), COMBINED N/A 2016    Procedure: COMBINED ESOPHAGOSCOPY, GASTROSCOPY, DUODENOSCOPY (EGD);  Surgeon: Santi Rosas MD;  Location:  GI     KNEE SURGERY Left        FAMILY HISTORY:    Family History   Problem Relation Age of Onset     Prostate Cancer Maternal Grandfather      Substance Abuse Maternal Grandfather      Alcohol     Colon Cancer Father 60     Pancreatic Cancer Father 60     Prostate Cancer Father      Colorectal Cancer Father      Macular Degeneration Father      CANCER Father      Colorectal Cancer Maternal Grandmother      CANCER Maternal Grandmother      Substance Abuse Maternal Grandmother      Alcohol     Colorectal Cancer Paternal Grandmother      CANCER Mother      DIABETES Mother       3/2016     CEREBROVASCULAR DISEASE Mother      Passed away in Feb of this year, 80 years old.     Thyroid Disease Mother      Depression Mother      Asthma Sister      Had since birth     Thyroid Disease Sister      Depression Sister      Liver Disease No family hx of        REVIEW OF SYSTEMS:  12 point ROS was negative other than the symptoms noted above in the HPI.  Patient Supplied Answers to Review of Systems   ENT ROS 2017   Constitutional Problems with sleep   Neurology Numbness, Headache   Eyes Visual loss   Ears, Nose, Throat Ear pain   Musculoskeletal Sore or stiff joints, Back pain   Allergy/Immunology Allergies or hay fever  "  Hematologic Bleeding problems   Endocrine Thirst         PHYSICAL EXAMINATION:   /67  Pulse 84  Temp 98.4  F (36.9  C)  Ht 1.753 m (5' 9\")  Wt 85.3 kg (188 lb)  SpO2 98%  BMI 27.76 kg/m2  Appearance:   normal; NAD, age-appropriate appearance, well-developed, normal habitus   Communication:   normal; communicates verbally, normal voice quality   Head/Face:   inspection -  3 cm visible mass in the right temporal region overlying the zygoma   Salivary glands -  there is a firm mass that is present in the right temporal/parotid region that overlies the zygoma with measurements of about 3-1/2 cm in diameter without any overlying skin changes that is extremely tender to palpation   Facial strength -  Normal and symmetric bilateral; H/B I/VI   Skin:  normal, no rash   Ocular Motility:  normal occular movements   Ears:  auricle (AD) -  normal  EAC (AD) -  normal  TM (AD) -  Normal, no effusion  auricle (AS) -  there is a crusted lesion on the helix  EAC (AS) -  normal  TM (AS) -  Normal, no effusion  Normal clinical speech reception   Nose:  Ext. inspection -  Normal   Oral Cavity:  lips -  Normal mucosa, oral competence, and stoma size   No trismus   Teeth are in poor repair with multiple caries and broken teeth   Hard palate, buccal, floor of mouth mucosa normal   Tongue - normal movement, no lesions, normal sensation   Oropharynx:  mucosa -  Normal, no lesions  soft palate -  Normal, no lesions, no asymmetry, normal elevation  tonsils -  Normal, no exudates, no abnormal lesions, symmetric   Neck: No visible mass or asymmetry   Normal palpation, no tenderness, no tracheal deviation  Normal range of motion   Lymphatic:  no abnormal nodes   Cardiovascular:  warm, pink, well-perfused extremities without swelling, tenderness, or edema   Respiratory:  Normal respiratory effort, no stridor   Neuro/Psych.:  mood/affect -  normal  mental status -  normal  cranial nerves -  normal          RESULTS REVIEWED:   I " independently reviewed the CT scan images which show an approximately 3 cm mass in the right parotid which overlies the zygoma with poorly defined margins without any evidence bony erosion. There is no adenopathy.    Findings:   3.1 x 2.0 x 3.3 cm solid predominantly peripherally enhancing mass in  the superior aspect of the right parotid gland, which demonstrates  ill-defined hazy margins. The mass overlies the right zygomatic arch,  though there is no evidence of abnormal bony sclerosis or erosion. No  suspicious cervical lymphadenopathy.     Mucosal spaces are clear. Left parotid gland and submandibular glands  are normal. The thyroid gland is normal. Patent major cervical  vasculature. Atherosclerotic calcifications. Lung apices are clear.  Paranasal sinuses and mastoid air cells are clear. Orbits and  visualized intercranial contents are unremarkable.         Impression:  1. Solid right parotid mass with infiltrative margins, malignancy  until proven otherwise. Differential includes primary salivary gland  malignancy or metastatic intraparotid lymph node. Recommend soft  tissue sampling.  2. No cervical adenopathy.    IMPRESSION AND PLAN:   Frandy Workman is a 52-year-old gentleman with a right-sided parotid mass. I discussed with him that I am concerned that this is a mass within the parotid and not related to an inflammatory process, and likely unrelated to his recent dental work. We did have pathology perform an FNA in clinic today which unfortunately only showed inflammatory cells. I discussed with the patient that we will treat him with a course of antibiotics for about 10 days and in the meantime await the results of the FNA. If the FNA just shows inflammatory cells on final cytology I would like to proceed with a ultrasound-guided FNA (vs core) to get further information. I am concerned that this is a malignancy within the parotid gland. This certainly could be a metastatic lesion from his previous skin  cancer on his forehead or a primary parotid lesion. Depending on the results of the biopsy I will see him back in clinic. We also discussed that he has a crusted lesion on his left auricle which he says has been there for a few weeks as he continually picks at the area. I removed the crusting and recommended that he treat this with Aquaphor. I told him that if the lesion persists when he returns to clinic in about a week or so we will plan on performing a biopsy of this area.    Thank you very much for the opportunity to participate in the care of your patient.      Asiya Morgan M.D.  Otolaryngology- Head & Neck Surgery        I spent a total of 25 minutes face-to-face with Frandy Workman during today s office visit. Over 50% of this time was spent counseling the patient and/or coordinating care regarding the mass.    CC:  Natalie Russell MD  [address] [facility] [place] [address]  [facility] [address]  Sheba Rueda MD  Otolaryngology/Head & Neck Surgery  [facility] [address]

## 2017-09-30 PROBLEM — K11.8 PAROTID MASS: Status: ACTIVE | Noted: 2017-09-30

## 2017-10-03 DIAGNOSIS — K75.81 LIVER CIRRHOSIS SECONDARY TO NASH (H): ICD-10-CM

## 2017-10-03 DIAGNOSIS — I85.10 SECONDARY ESOPHAGEAL VARICES WITHOUT BLEEDING (H): ICD-10-CM

## 2017-10-03 DIAGNOSIS — K74.60 LIVER CIRRHOSIS SECONDARY TO NASH (H): ICD-10-CM

## 2017-10-03 LAB — COPATH REPORT: NORMAL

## 2017-10-04 DIAGNOSIS — R18.8 OTHER ASCITES: ICD-10-CM

## 2017-10-04 DIAGNOSIS — E11.9 TYPE II DIABETES MELLITUS (H): Primary | ICD-10-CM

## 2017-10-04 RX ORDER — CARVEDILOL 12.5 MG/1
12.5 TABLET ORAL 2 TIMES DAILY WITH MEALS
Qty: 180 TABLET | Refills: 3 | Status: SHIPPED | OUTPATIENT
Start: 2017-10-04 | End: 2018-10-02

## 2017-10-04 NOTE — TELEPHONE ENCOUNTER
carvedilol        Last Written Prescription Date:  10/25/16  Last Fill Quantity: 180,   # refills: 3  Last Office Visit : 6/9/17  Future Office visit:  12/11/17  BP Readings from Last 3 Encounters:   09/29/17 123/67   09/27/17 112/59   09/20/17 105/71

## 2017-10-05 ENCOUNTER — CARE COORDINATION (OUTPATIENT)
Dept: OTOLARYNGOLOGY | Facility: CLINIC | Age: 52
End: 2017-10-05

## 2017-10-05 DIAGNOSIS — K11.8 PAROTID MASS: Primary | ICD-10-CM

## 2017-10-05 RX ORDER — SPIRONOLACTONE 25 MG/1
25 TABLET ORAL DAILY
Qty: 90 TABLET | Refills: 1 | Status: SHIPPED | OUTPATIENT
Start: 2017-10-05 | End: 2018-04-16 | Stop reason: SINTOL

## 2017-10-05 NOTE — PROGRESS NOTES
Called patient with the following results:  CYTOLOGIC INTERPRETATION:     Parotid, right, fine needle aspiration:   - Benign   - Acute inflammation and rare reactive acinar cells (see comment)   Specimen Adequacy: Satisfactory for evaluation but limited by scant   cellularity.     COMMENT:   The changes seen may be acute sialadenitis or acute inflammation   overlying another disorder. The paucity of tissue with just acute   inflammation present in this aspirate limits the evaluation of this   case. Re-biopsy is recommended if the mass remains after the   inflammation subsides    Dr. Morgan would like patient to undergo an US guided biopsy (FNA vs Core). Patient is agreeable to plan. We will arrange date and time for us guided biopsy and we will call patient with this information. Patient verbalized understanding and will call with further questions or concerns.     Gay Jamil, RN, BSN

## 2017-10-06 ENCOUNTER — DOCUMENTATION ONLY (OUTPATIENT)
Dept: INTERVENTIONAL RADIOLOGY/VASCULAR | Facility: CLINIC | Age: 52
End: 2017-10-06

## 2017-10-06 DIAGNOSIS — K74.69 OTHER CIRRHOSIS OF LIVER (H): Primary | ICD-10-CM

## 2017-10-06 NOTE — PROGRESS NOTES
Patient to be placed  on Neuroradiology schedule  for us guided  Right parotid mass biopsy.     Discussed with Dr. Cong Rowley IR RPA  [phone] Call pager  [phone] pager

## 2017-10-09 ENCOUNTER — TELEPHONE (OUTPATIENT)
Dept: INTERVENTIONAL RADIOLOGY/VASCULAR | Facility: CLINIC | Age: 52
End: 2017-10-09

## 2017-10-09 ASSESSMENT — ENCOUNTER SYMPTOMS
POOR WOUND HEALING: 0
DOUBLE VISION: 0
BRUISES/BLEEDS EASILY: 1
EYE IRRITATION: 1
TASTE DISTURBANCE: 0
SMELL DISTURBANCE: 0
SORE THROAT: 0
EYE REDNESS: 0
NAIL CHANGES: 0
EYE WATERING: 1
HOARSE VOICE: 1
NECK MASS: 0
SKIN CHANGES: 1
SINUS CONGESTION: 0
TROUBLE SWALLOWING: 0
EYE PAIN: 1
SINUS PAIN: 1

## 2017-10-11 ENCOUNTER — APPOINTMENT (OUTPATIENT)
Dept: MEDSURG UNIT | Facility: CLINIC | Age: 52
End: 2017-10-11
Attending: OTOLARYNGOLOGY
Payer: MEDICARE

## 2017-10-11 ENCOUNTER — HOSPITAL ENCOUNTER (OUTPATIENT)
Dept: INTERVENTIONAL RADIOLOGY/VASCULAR | Facility: CLINIC | Age: 52
End: 2017-10-11
Attending: OTOLARYNGOLOGY | Admitting: OTOLARYNGOLOGY
Payer: MEDICARE

## 2017-10-11 ENCOUNTER — HOSPITAL ENCOUNTER (OUTPATIENT)
Facility: CLINIC | Age: 52
Discharge: HOME | End: 2017-10-11
Attending: OTOLARYNGOLOGY | Admitting: OTOLARYNGOLOGY
Payer: MEDICARE

## 2017-10-11 VITALS
RESPIRATION RATE: 16 BRPM | BODY MASS INDEX: 27.85 KG/M2 | SYSTOLIC BLOOD PRESSURE: 102 MMHG | OXYGEN SATURATION: 100 % | DIASTOLIC BLOOD PRESSURE: 54 MMHG | HEIGHT: 69 IN | TEMPERATURE: 98 F | WEIGHT: 188.05 LBS

## 2017-10-11 VITALS
OXYGEN SATURATION: 95 % | DIASTOLIC BLOOD PRESSURE: 60 MMHG | HEART RATE: 72 BPM | RESPIRATION RATE: 14 BRPM | SYSTOLIC BLOOD PRESSURE: 93 MMHG

## 2017-10-11 DIAGNOSIS — K11.8 PAROTID MASS: ICD-10-CM

## 2017-10-11 LAB
GLUCOSE BLDC GLUCOMTR-MCNC: 160 MG/DL (ref 70–99)
GLUCOSE BLDC GLUCOMTR-MCNC: 319 MG/DL (ref 70–99)
GRAM STN SPEC: NORMAL
GRAM STN SPEC: NORMAL
INR BLD: 1.1 (ref 0.86–1.14)
SPECIMEN SOURCE: NORMAL

## 2017-10-11 PROCEDURE — 27210732 IR LYMPH NODE BIOPSY

## 2017-10-11 PROCEDURE — 85610 PROTHROMBIN TIME: CPT | Mod: QW

## 2017-10-11 PROCEDURE — 88172 CYTP DX EVAL FNA 1ST EA SITE: CPT | Performed by: OTOLARYNGOLOGY

## 2017-10-11 PROCEDURE — 88173 CYTOPATH EVAL FNA REPORT: CPT | Performed by: OTOLARYNGOLOGY

## 2017-10-11 PROCEDURE — 25000125 ZZHC RX 250: Performed by: RADIOLOGY

## 2017-10-11 PROCEDURE — 25000128 H RX IP 250 OP 636: Performed by: RADIOLOGY

## 2017-10-11 PROCEDURE — 87205 SMEAR GRAM STAIN: CPT | Performed by: OTOLARYNGOLOGY

## 2017-10-11 PROCEDURE — 87077 CULTURE AEROBIC IDENTIFY: CPT | Performed by: OTOLARYNGOLOGY

## 2017-10-11 PROCEDURE — 99153 MOD SED SAME PHYS/QHP EA: CPT

## 2017-10-11 PROCEDURE — 82962 GLUCOSE BLOOD TEST: CPT | Mod: 91

## 2017-10-11 PROCEDURE — 25000128 H RX IP 250 OP 636: Performed by: PHYSICIAN ASSISTANT

## 2017-10-11 PROCEDURE — 40000166 ZZH STATISTIC PP CARE STAGE 1

## 2017-10-11 PROCEDURE — 87070 CULTURE OTHR SPECIMN AEROBIC: CPT | Performed by: OTOLARYNGOLOGY

## 2017-10-11 PROCEDURE — 00000155 ZZHCL STATISTIC H-CELL BLOCK W/STAIN: Performed by: OTOLARYNGOLOGY

## 2017-10-11 PROCEDURE — 88305 TISSUE EXAM BY PATHOLOGIST: CPT | Performed by: OTOLARYNGOLOGY

## 2017-10-11 PROCEDURE — 82962 GLUCOSE BLOOD TEST: CPT

## 2017-10-11 RX ORDER — LIDOCAINE 40 MG/G
CREAM TOPICAL
Status: DISCONTINUED | OUTPATIENT
Start: 2017-10-11 | End: 2017-10-11 | Stop reason: HOSPADM

## 2017-10-11 RX ORDER — SODIUM CHLORIDE 9 MG/ML
INJECTION, SOLUTION INTRAVENOUS CONTINUOUS
Status: DISCONTINUED | OUTPATIENT
Start: 2017-10-11 | End: 2017-10-11 | Stop reason: HOSPADM

## 2017-10-11 RX ORDER — FLUMAZENIL 0.1 MG/ML
0.2 INJECTION, SOLUTION INTRAVENOUS
Status: DISCONTINUED | OUTPATIENT
Start: 2017-10-11 | End: 2017-10-11 | Stop reason: HOSPADM

## 2017-10-11 RX ORDER — FENTANYL CITRATE 50 UG/ML
25-50 INJECTION, SOLUTION INTRAMUSCULAR; INTRAVENOUS EVERY 5 MIN PRN
Status: DISCONTINUED | OUTPATIENT
Start: 2017-10-11 | End: 2017-10-11 | Stop reason: HOSPADM

## 2017-10-11 RX ORDER — NALOXONE HYDROCHLORIDE 0.4 MG/ML
.1-.4 INJECTION, SOLUTION INTRAMUSCULAR; INTRAVENOUS; SUBCUTANEOUS
Status: DISCONTINUED | OUTPATIENT
Start: 2017-10-11 | End: 2017-10-11 | Stop reason: HOSPADM

## 2017-10-11 RX ADMIN — LIDOCAINE HYDROCHLORIDE 7 ML: 10 INJECTION, SOLUTION EPIDURAL; INFILTRATION; INTRACAUDAL; PERINEURAL at 14:02

## 2017-10-11 RX ADMIN — FENTANYL CITRATE 100 MCG: 50 INJECTION, SOLUTION INTRAMUSCULAR; INTRAVENOUS at 14:03

## 2017-10-11 RX ADMIN — MIDAZOLAM 2 MG: 1 INJECTION INTRAMUSCULAR; INTRAVENOUS at 14:02

## 2017-10-11 RX ADMIN — SODIUM CHLORIDE: 9 INJECTION, SOLUTION INTRAVENOUS at 11:51

## 2017-10-11 ASSESSMENT — PAIN DESCRIPTION - DESCRIPTORS: DESCRIPTORS: ACHING

## 2017-10-11 NOTE — PROGRESS NOTES
Interventional Radiology Pre-Procedure Sedation Assessment   Time of Assessment: 12:11 PM    Expected Level: Moderate Sedation    Indication: Sedation is required for the following type of Procedure: Biopsy    Sedation and procedural consent: Risks, benefits and alternatives were discussed with Patient    PO Intake: Appropriately NPO for procedure.  Patient ate this morning at 0630, which will be at least 6 hours prior to the procedure onset.     ASA Class: Class 2 - MILD SYSTEMIC DISEASE, NO ACUTE PROBLEMS, NO FUNCTIONAL LIMITATIONS.    Mallampati: Grade 2:  Soft palate, base of uvula, tonsillar pillars, and portion of posterior pharyngeal wall visible    Lungs: Lungs Clear with good breath sounds bilaterally    Heart: Normal heart sounds and rate    History and physical reviewed and no updates needed. I have reviewed the lab findings, diagnostic data, medications, and the plan for sedation. I have determined this patient to be an appropriate candidate for the planned sedation and procedure and have reassessed the patient IMMEDIATELY PRIOR to sedation and procedure.    Mike Gorman MD

## 2017-10-11 NOTE — PROGRESS NOTES
Pt arrives to 2a for lymph node biopsy. Pre procedure assessment completed. H&P is up to date. Consent needs to be signed. PIV placed.    Dr Gorman notified of the following: blood sugar is 319 (pt self administered 8 units novolog), pt last ate at 0630 and had clears at 1100, pt took baby aspirin this morning.  Last platelet count 50 on 9/27/17-no need to repeat CBC today per MD.

## 2017-10-11 NOTE — IP AVS SNAPSHOT
MRN:[id number]                      After Visit Summary   10/11/2017    Frandy Workman    MRN: [id number]           Visit Information        Department      10/11/2017 10:47 AM [address] [facility]          Review of your medicines      UNREVIEWED medicines. Ask your doctor about these medicines        Dose / Directions    amoxicillin 500 MG capsule   Commonly known as:  AMOXIL        TK ONE C PO TID TAT   Refills:  0       amoxicillin-clavulanate 875-125 MG per tablet   Commonly known as:  AUGMENTIN   Used for:  Parotid mass        Dose:  1 tablet   Take 1 tablet by mouth every 12 hours for 14 days   Quantity:  28 tablet   Refills:  0       aspirin 81 MG EC tablet   Commonly known as:  ASPIRIN LOW DOSE   Used for:  Type 2 diabetes mellitus without complication, with long-term current use of insulin (H)        Dose:  81 mg   Take 1 tablet (81 mg) by mouth daily   Quantity:  90 tablet   Refills:  3       carvedilol 12.5 MG tablet   Commonly known as:  COREG   Used for:  Liver cirrhosis secondary to ESTRADA (H), Secondary esophageal varices without bleeding (H)        Dose:  12.5 mg   Take 1 tablet (12.5 mg) by mouth 2 times daily (with meals)   Quantity:  180 tablet   Refills:  3       cephALEXin 500 MG capsule   Commonly known as:  KEFLEX   Used for:  Sialadenitis        Dose:  500 mg   Take 1 capsule (500 mg) by mouth 3 times daily   Quantity:  30 capsule   Refills:  0       dapagliflozin 10 MG Tabs tablet   Commonly known as:  FARXIGA   Used for:  Type 2 diabetes mellitus with hyperglycemia, with long-term current use of insulin (H)        Dose:  10 mg   Take 1 tablet (10 mg) by mouth daily   Quantity:  90 tablet   Refills:  3       insulin aspart 100 UNIT/ML injection   Commonly known as:  NovoLOG FLEXPEN   Used for:  Type II diabetes mellitus (H)        1 unit per 4 Gms CHO at meals and snacks + correction scale of 1 unit per 25 mg/dL over 125. Average daily dose is 75 units.   Quantity:  24  mL   Refills:  11       insulin degludec 200 UNIT/ML pen   Commonly known as:  TRESIBA   Used for:  Type 2 diabetes mellitus with hyperglycemia, with long-term current use of insulin (H)        Take 120 units daily.   Quantity:  21 mL   Refills:  11       lactulose 10 GM/15ML solution   Commonly known as:  CHRONULAC   Used for:  Liver cirrhosis secondary to ESTRADA (H)        Dose:  30 g   Take 45 mLs (30 g) by mouth 4 times daily   Quantity:  7200 mL   Refills:  11       OLANZapine 2.5 MG tablet   Commonly known as:  zyPREXA   Used for:  Insomnia, unspecified type        Dose:  2.5 mg   Take 1 tablet (2.5 mg) by mouth At Bedtime   Quantity:  30 tablet   Refills:  5       omeprazole 40 MG capsule   Commonly known as:  priLOSEC   Used for:  Gastroesophageal reflux disease, esophagitis presence not specified        Dose:  40 mg   Take 1 capsule (40 mg) by mouth daily   Quantity:  90 capsule   Refills:  2       potassium chloride SA 20 MEQ CR tablet   Commonly known as:  K-DUR/KLOR-CON M   Used for:  Hypokalemia        Dose:  20 mEq   Take 1 tablet (20 mEq) by mouth daily   Quantity:  90 tablet   Refills:  3       RA VITAMIN B-12 TR 1000 MCG Tbcr   Generic drug:  cyanocobalamin        Dose:  1000 mcg   Take 1,000 mcg by mouth daily   Refills:  0       rifaximin 550 MG Tabs tablet   Commonly known as:  XIFAXAN   Used for:  Hepatic encephalopathy (H)        Dose:  550 mg   Take 1 tablet (550 mg) by mouth 2 times daily   Quantity:  60 tablet   Refills:  11       simvastatin 20 MG tablet   Commonly known as:  ZOCOR   Used for:  Hyperlipidemia LDL goal <100, Type 2 diabetes mellitus without complication, with long-term current use of insulin (H)        Dose:  20 mg   Take 1 tablet (20 mg) by mouth At Bedtime   Quantity:  90 tablet   Refills:  3       spironolactone 25 MG tablet   Commonly known as:  ALDACTONE   Used for:  Other ascites        Dose:  25 mg   Take 1 tablet (25 mg) by mouth daily   Quantity:  90 tablet   Refills:   1       tamsulosin 0.4 MG capsule   Commonly known as:  FLOMAX   Used for:  Benign prostatic hyperplasia with lower urinary tract symptoms        Dose:  0.4 mg   Take 1 capsule (0.4 mg) by mouth daily   Quantity:  90 capsule   Refills:  2       THERAVITE PO        Dose:  1 tablet   Take 1 tablet by mouth daily   Refills:  0       VITAMIN D-3 PO        Dose:  2000 Units   Take 2,000 Units by mouth   Refills:  0         CONTINUE these medicines which have NOT CHANGED        Dose / Directions    blood glucose monitoring lancets   Used for:  Type II diabetes mellitus (H)        Use to test blood sugar 4 times daily or as directed.  1 box = 102 lancets   Quantity:  3 Box   Refills:  3       blood glucose monitoring test strip   Commonly known as:  ACCU-CHEK EDINSON PLUS   Used for:  Type II diabetes mellitus (H)        Use to test blood sugar 4 times daily   Quantity:  400 each   Refills:  3       insulin pen needle 32G X 4 MM   Commonly known as:  BD VIKTORIA U/F   Used for:  Type II diabetes mellitus (H)        Use as directed.   Quantity:  100 each   Refills:  11                Protect others around you: Learn how to safely use, store and throw away your medicines at www.disposemymeds.org.         Follow-ups after your visit        Your next 10 appointments already scheduled     Oct 16, 2017 12:00 PM CDT   Lab with [facility]   [facility] ([facility])    [address] [place] [address] [phone]            Oct 16, 2017  1:00 PM CDT   (Arrive by 12:45 PM)   Return General Liver with Santi Dotson MD   [facility] Hepatology ([facility])    [address] [place] [id number] [phone]            Oct 16, 2017  2:00 PM CDT   (Arrive by 1:45 PM)   RETURN ENDOCRINE with Jennifer Wilcox MD   [facility] Endocrin[facility] ([facility])    [address] [place] [address]  [address]   [phone]            Dec 11, 2017  2:00 PM CST   (Arrive by 1:45 PM)   Return Visit with Natalie Russell MD   [facility] ([facility])    [address]  4th Floor  [facility] [address]   [phone]            Mar 08, 2018 12:30 PM CST   (Arrive by 12:15 PM)   Return Visit with Lamin Gonzalez DPM   [facility] ([facility])    [address]  3rd Floor  [facility] [address]   [phone]               Care Instructions        Further instructions from your care team       [facility]    Patient Instructions Following Biopsy    AFTER YOU GO HOME  ? If you were given sedation DO NOT drive or operate machinery at home or at work for at least 24 hours  ? DO relax and take it easy for 48 hours, no strenuous activity for 24 hours  ? DO drink plenty of fluids  ? DO resume your regular diet, unless otherwise instructed by your Primary Physician  ? Keep the dressing dry and in place for 24 hours.  ? DO NOT SMOKE FOR AT LEAST 24 HOURS, if you have been given any medications that were to help you relax or sedate you during your procedure  ? DO NOT drink alcoholic beverages the day of your procedure  ? DO NOT do any strenuous exercise or lifting (> 10 lbs) for at least 3 days following your procedure  ? DO NOT take a bath or shower for at least 12 hours following your procedure  ? Remove dressing after shower the next day. Replace with Band aid for 2 days.  Never leave a wet dressing in place.  ? DO NOT make any important or legal decisions for 24 hours following your procedure  ? There should be minimum drainage from the biopsy site    CALL THE PHYSICIAN IF:  ? You start bleeding from the procedure site.  If you do start to bleed from that site, lie down flat and hold pressure on the site for a minimum of 10 minutes.  Your physician will tell you if you need to return to the  "hospital  ? You develop nausea or vomiting  ? You have excessive swelling, redness, or tenderness at the site  ? You have drainage that looks like it is infected.  ? You experience severe pain  ? You develop hives or a rash or unexplained itching  ? You develop shortness of breath  ? You develop a temperature of 101 degrees F or greater    ADDITIONAL INSTRUCTIONS  If you are taking Coumadin, restart tonight.  Follow up with your Coumadin Clinic or Primary Care MD to have your INR rechecked.    [facility] NEURO RADIOLOGY DEPARTMENT  Procedure Physicians: Dr Gorman, Dr Russo                                       Date of procedure: October 11, 2017  Telephone Number: [phone] Ask for the Neuro Radiologist on call.  Someone is on call 24 hrs/day  [facility] Emergency Dept: [phone]           Additional Information About Your Visit        [employer] Information     [email or web address] gives you secure access to your electronic health record. If you see a primary care provider, you can also send messages to your care team and make appointments. If you have questions, please call your primary care clinic.  If you do not have a primary care provider, please call [phone] and they will assist you.        Care EveryWhere ID     This is your Care EveryWhere ID. This could be used by other organizations to access your [facility] medical records  [id number]        Your Vitals Were     Blood Pressure Temperature Respirations Height Weight Pulse Oximetry    107/63 98  F (36.7  C) 16 1.753 m (5' 9\") 85.3 kg (188 lb 0.8 oz) 99%    BMI (Body Mass Index)                   27.77 kg/m2            Primary Care Provider Office Phone # Fax #    Natalie Mitzi Andrés Russell [phone]      Equal Access to Services     [facility]: Amelia Vanessa, waadrianeda luqjacek, qaybta cheralemy herrera. So [facility] [phone].    ATENCIÓN: Si habla español, tiene a hanley disposición servicios " xander de asistencia lingüística. Rl peters [phone].    We comply with applicable federal civil rights laws and [place] laws. We do not discriminate on the basis of race, color, national origin, age, disability, sex, sexual orientation, or gender identity.            Thank you!     Thank you for choosing [facility] for your care. Our goal is always to provide you with excellent care. Hearing back from our patients is one way we can continue to improve our services. Please take a few minutes to complete the written survey that you may receive in the mail after you visit with us. Thank you!             Medication List: This is a list of all your medications and when to take them. Check marks below indicate your daily home schedule. Keep this list as a reference.      Medications           Morning Afternoon Evening Bedtime As Needed    amoxicillin 500 MG capsule   Commonly known as:  AMOXIL   TK ONE C PO TID TAT                                amoxicillin-clavulanate 875-125 MG per tablet   Commonly known as:  AUGMENTIN   Take 1 tablet by mouth every 12 hours for 14 days                                aspirin 81 MG EC tablet   Commonly known as:  ASPIRIN LOW DOSE   Take 1 tablet (81 mg) by mouth daily                                blood glucose monitoring lancets   Use to test blood sugar 4 times daily or as directed.  1 box = 102 lancets                                blood glucose monitoring test strip   Commonly known as:  ACCU-CHEK EDINSON PLUS   Use to test blood sugar 4 times daily                                carvedilol 12.5 MG tablet   Commonly known as:  COREG   Take 1 tablet (12.5 mg) by mouth 2 times daily (with meals)                                cephALEXin 500 MG capsule   Commonly known as:  KEFLEX   Take 1 capsule (500 mg) by mouth 3 times daily                                dapagliflozin 10 MG Tabs tablet   Commonly known as:  FARXIGA   Take 1 tablet (10 mg) by mouth daily                                 insulin aspart 100 UNIT/ML injection   Commonly known as:  NovoLOG FLEXPEN   1 unit per 4 Gms CHO at meals and snacks + correction scale of 1 unit per 25 mg/dL over 125. Average daily dose is 75 units.                                insulin degludec 200 UNIT/ML pen   Commonly known as:  TRESIBA   Take 120 units daily.                                insulin pen needle 32G X 4 MM   Commonly known as:  BD VIKTORIA U/F   Use as directed.                                lactulose 10 GM/15ML solution   Commonly known as:  CHRONULAC   Take 45 mLs (30 g) by mouth 4 times daily                                OLANZapine 2.5 MG tablet   Commonly known as:  zyPREXA   Take 1 tablet (2.5 mg) by mouth At Bedtime                                omeprazole 40 MG capsule   Commonly known as:  priLOSEC   Take 1 capsule (40 mg) by mouth daily                                potassium chloride SA 20 MEQ CR tablet   Commonly known as:  K-DUR/KLOR-CON M   Take 1 tablet (20 mEq) by mouth daily                                RA VITAMIN B-12 TR 1000 MCG Tbcr   Take 1,000 mcg by mouth daily   Generic drug:  cyanocobalamin                                rifaximin 550 MG Tabs tablet   Commonly known as:  XIFAXAN   Take 1 tablet (550 mg) by mouth 2 times daily                                simvastatin 20 MG tablet   Commonly known as:  ZOCOR   Take 1 tablet (20 mg) by mouth At Bedtime                                spironolactone 25 MG tablet   Commonly known as:  ALDACTONE   Take 1 tablet (25 mg) by mouth daily                                tamsulosin 0.4 MG capsule   Commonly known as:  FLOMAX   Take 1 capsule (0.4 mg) by mouth daily                                THERAVITE PO   Take 1 tablet by mouth daily                                VITAMIN D-3 PO   Take 2,000 Units by mouth

## 2017-10-11 NOTE — PROGRESS NOTES
Pt has returned to unit 2a s/p fine needle aspiration of the right parotid mass. Incision CDI, no bleeding. VSS. Pt denies pain. Pt tolerating PO. Blood sugar 160.   1515 Pt has ambulated in [place] with steady gait. Discharge instructions reviewed with pt, pt verbalizes understanding. VSS. Incision remains CDI. PIV d/c'd.   1520 Pt transported to [facility] via wheelchair. Pts friend/PCA, Kailash, providing ride home.

## 2017-10-11 NOTE — DISCHARGE INSTRUCTIONS
[facility]    Patient Instructions Following Biopsy    AFTER YOU GO HOME  ? If you were given sedation DO NOT drive or operate machinery at home or at work for at least 24 hours  ? DO relax and take it easy for 48 hours, no strenuous activity for 24 hours  ? DO drink plenty of fluids  ? DO resume your regular diet, unless otherwise instructed by your Primary Physician  ? Keep the dressing dry and in place for 24 hours.  ? DO NOT SMOKE FOR AT LEAST 24 HOURS, if you have been given any medications that were to help you relax or sedate you during your procedure  ? DO NOT drink alcoholic beverages the day of your procedure  ? DO NOT do any strenuous exercise or lifting (> 10 lbs) for at least 3 days following your procedure  ? DO NOT take a bath or shower for at least 12 hours following your procedure  ? Remove dressing after shower the next day. Replace with Band aid for 2 days.  Never leave a wet dressing in place.  ? DO NOT make any important or legal decisions for 24 hours following your procedure  ? There should be minimum drainage from the biopsy site    CALL THE PHYSICIAN IF:  ? You start bleeding from the procedure site.  If you do start to bleed from that site, lie down flat and hold pressure on the site for a minimum of 10 minutes.  Your physician will tell you if you need to return to the hospital  ? You develop nausea or vomiting  ? You have excessive swelling, redness, or tenderness at the site  ? You have drainage that looks like it is infected.  ? You experience severe pain  ? You develop hives or a rash or unexplained itching  ? You develop shortness of breath  ? You develop a temperature of 101 degrees F or greater    ADDITIONAL INSTRUCTIONS  If you are taking Coumadin, restart tonight.  Follow up with your Coumadin Clinic or Primary Care MD to have your INR rechecked.    [facility] NEURO RADIOLOGY DEPARTMENT  Procedure Physicians: Dr Gorman, Dr Russo                                        Date of procedure: October 11, 2017  Telephone Number: [phone] Ask for the Neuro Radiologist on call.  Someone is on call 24 hrs/day  [facility] Emergency Dept: [phone]

## 2017-10-11 NOTE — PROGRESS NOTES
Interventional Radiology Intra-procedural Nursing Note    Patient Name: Frandy Workman  Medical Record Number: [id number]  Today's Date: October 11, 2017    Start Time: 1330  End of procedure time: 1355  Procedure: Right Parotid Mass Fine Needle Aspiration  Report given to: CHAVEZ Tabor 2A  Time pt departs: 1400  Proceduralist: Dr. Mike Russo and Dr. Mike Gorman    Other Notes:      Pt to IR Holding Room #6 from Unit 2A. Consent confirmed with patient; questions addressed.  Pt positioned supine and prepped on IR table by technologist. Fine Needle Aspirate x 4 completed, and all samples collected by cytology.  Pt tolerated procedure without apparent incident. Pt denies pain post-procedure.    Sedation Time = 25 minutes  Fentanyl given: 100 mcg  Versed given: 2 mg    Audi Matias RN

## 2017-10-11 NOTE — IP AVS SNAPSHOT
[address] [address]                                       After Visit Summary   10/11/2017    Frandy Workman    MRN: [id number]           After Visit Summary Signature Page     I have received my discharge instructions, and my questions have been answered. I have discussed any challenges I see with this plan with the nurse or doctor.    ..........................................................................................................................................  Patient/Patient Representative Signature      ..........................................................................................................................................  Patient Representative Print Name and Relationship to Patient    ..................................................               ................................................  Date                                            Time    ..........................................................................................................................................  Reviewed by Signature/Title    ...................................................              ..............................................  Date                                                            Time

## 2017-10-12 ENCOUNTER — MYC REFILL (OUTPATIENT)
Dept: GASTROENTEROLOGY | Facility: CLINIC | Age: 52
End: 2017-10-12

## 2017-10-12 ENCOUNTER — MYC REFILL (OUTPATIENT)
Dept: INTERNAL MEDICINE | Facility: CLINIC | Age: 52
End: 2017-10-12

## 2017-10-12 DIAGNOSIS — K75.81 LIVER CIRRHOSIS SECONDARY TO NASH (H): ICD-10-CM

## 2017-10-12 DIAGNOSIS — E11.9 TYPE II DIABETES MELLITUS (H): ICD-10-CM

## 2017-10-12 DIAGNOSIS — E78.5 HYPERLIPIDEMIA LDL GOAL <100: ICD-10-CM

## 2017-10-12 DIAGNOSIS — K76.82 HEPATIC ENCEPHALOPATHY (H): ICD-10-CM

## 2017-10-12 DIAGNOSIS — R18.8 OTHER ASCITES: ICD-10-CM

## 2017-10-12 DIAGNOSIS — Z79.4 TYPE 2 DIABETES MELLITUS WITH HYPERGLYCEMIA, WITH LONG-TERM CURRENT USE OF INSULIN (H): ICD-10-CM

## 2017-10-12 DIAGNOSIS — K21.9 GASTROESOPHAGEAL REFLUX DISEASE, ESOPHAGITIS PRESENCE NOT SPECIFIED: ICD-10-CM

## 2017-10-12 DIAGNOSIS — E87.6 HYPOKALEMIA: ICD-10-CM

## 2017-10-12 DIAGNOSIS — E11.9 TYPE 2 DIABETES MELLITUS WITHOUT COMPLICATION, WITH LONG-TERM CURRENT USE OF INSULIN (H): ICD-10-CM

## 2017-10-12 DIAGNOSIS — Z79.4 TYPE 2 DIABETES MELLITUS WITHOUT COMPLICATION, WITH LONG-TERM CURRENT USE OF INSULIN (H): ICD-10-CM

## 2017-10-12 DIAGNOSIS — K74.60 LIVER CIRRHOSIS SECONDARY TO NASH (H): ICD-10-CM

## 2017-10-12 DIAGNOSIS — E11.65 TYPE 2 DIABETES MELLITUS WITH HYPERGLYCEMIA, WITH LONG-TERM CURRENT USE OF INSULIN (H): ICD-10-CM

## 2017-10-12 DIAGNOSIS — G47.00 INSOMNIA, UNSPECIFIED TYPE: ICD-10-CM

## 2017-10-12 DIAGNOSIS — N40.1 BENIGN PROSTATIC HYPERPLASIA WITH LOWER URINARY TRACT SYMPTOMS: ICD-10-CM

## 2017-10-12 DIAGNOSIS — I85.10 SECONDARY ESOPHAGEAL VARICES WITHOUT BLEEDING (H): ICD-10-CM

## 2017-10-12 RX ORDER — TAMSULOSIN HYDROCHLORIDE 0.4 MG/1
0.4 CAPSULE ORAL DAILY
Qty: 90 CAPSULE | Refills: 2 | Status: CANCELLED | OUTPATIENT
Start: 2017-10-12

## 2017-10-12 RX ORDER — SPIRONOLACTONE 25 MG/1
25 TABLET ORAL DAILY
Qty: 90 TABLET | Refills: 1 | Status: CANCELLED | OUTPATIENT
Start: 2017-10-12

## 2017-10-12 RX ORDER — SIMVASTATIN 20 MG
20 TABLET ORAL AT BEDTIME
Qty: 90 TABLET | Refills: 3 | Status: CANCELLED | OUTPATIENT
Start: 2017-10-12

## 2017-10-12 RX ORDER — CARVEDILOL 12.5 MG/1
12.5 TABLET ORAL 2 TIMES DAILY WITH MEALS
Qty: 180 TABLET | Refills: 3 | Status: CANCELLED | OUTPATIENT
Start: 2017-10-12

## 2017-10-12 RX ORDER — OMEPRAZOLE 40 MG/1
40 CAPSULE, DELAYED RELEASE ORAL DAILY
Qty: 90 CAPSULE | Refills: 2 | Status: CANCELLED | OUTPATIENT
Start: 2017-10-12

## 2017-10-12 RX ORDER — POTASSIUM CHLORIDE 1500 MG/1
20 TABLET, EXTENDED RELEASE ORAL DAILY
Qty: 90 TABLET | Refills: 3 | Status: CANCELLED | OUTPATIENT
Start: 2017-10-12

## 2017-10-12 RX ORDER — OLANZAPINE 2.5 MG/1
2.5 TABLET, FILM COATED ORAL AT BEDTIME
Qty: 30 TABLET | Refills: 5 | Status: CANCELLED | OUTPATIENT
Start: 2017-10-12

## 2017-10-12 RX ORDER — DAPAGLIFLOZIN 10 MG/1
10 TABLET, FILM COATED ORAL DAILY
Qty: 90 TABLET | Refills: 3 | Status: SHIPPED | OUTPATIENT
Start: 2017-10-12 | End: 2017-10-16

## 2017-10-13 RX ORDER — LACTULOSE 10 G/15ML
30 SOLUTION ORAL 4 TIMES DAILY
Qty: 7200 ML | Refills: 11 | Status: SHIPPED | OUTPATIENT
Start: 2017-10-13 | End: 2018-12-27

## 2017-10-13 RX ORDER — LANCETS
EACH MISCELLANEOUS
Qty: 408 EACH | Refills: 3 | Status: ON HOLD | OUTPATIENT
Start: 2017-10-13 | End: 2020-06-12

## 2017-10-13 NOTE — TELEPHONE ENCOUNTER
Message from [facility]:  Deena Marshall LPN Thu Oct 12, 2017 1:44 PM        ----- Message -----   From: Frandy Workman   Sent: 10/12/2017 3:53 AM   To: Hepatology Nurses-  Subject: Medication Renewal Request     Original authorizing provider: MD Miller Murguia would like a refill of the following medications:  rifaximin (XIFAXAN) 550 MG TABS tablet [Santi Banuelos MD]  lactulose (CHRONULAC) 10 GM/15ML solution [Santi Banuelos MD]    Preferred pharmacy: [facility] [address] [place] [address] & [address]    Comment:      Medication renewals requested in this message routed to other providers:  blood glucose monitoring (ACCU-CHEK EDINSON PLUS) test strip [Natalie Russell MD]  blood glucose monitoring (ACCU-CHEK FASTCLIX) lancets [Natalie Russell MD]  insulin aspart (NOVOLOG FLEXPEN) 100 UNIT/ML injection [Natalie Russell MD]  potassium chloride SA (K-DUR/KLOR-CON M) 20 MEQ CR tablet [Natalie Russell MD]  OLANZapine (ZYPREXA) 2.5 MG tablet [Natalie Russell MD]  omeprazole (PRILOSEC) 40 MG capsule [Natalie Russell MD]  tamsulosin (FLOMAX) 0.4 MG capsule [Natalie Russell MD]  simvastatin (ZOCOR) 20 MG tablet [Natalie Russell MD]  aspirin (ASPIRIN LOW DOSE) 81 MG EC tablet [Natalie Russell MD]  carvedilol (COREG) 12.5 MG tablet [Natalie Russell MD]  spironolactone (ALDACTONE) 25 MG tablet [Natalie Russell MD]  insulin pen needle (BD VIKTORIA U/F) 32G X 4 MM [Natalie Russell MD]  dapagliflozin (FARXIGA) 10 MG TABS tablet [Jennifer Wilcox MD]  insulin degludec (TRESIBA) 200 UNIT/ML pen [Cecilia Bustamante MD]

## 2017-10-13 NOTE — TELEPHONE ENCOUNTER
Message from [facility]:  Ayah Paulino RN Thu Oct 12, 2017 8:06 AM        ----- Message -----   From: Frandy Workman   Sent: 10/12/2017 3:53 AM   To: [facility] Nursing Staff-  Subject: Medication Renewal Request     Original authorizing provider: MD Miller Patel would like a refill of the following medications:  blood glucose monitoring (ACCU-CHEK EDINSON PLUS) test strip [Natalie Russell MD]  blood glucose monitoring (ACCU-CHEK FASTCLIX) lancets [Natalie Russell MD]  insulin aspart (NOVOLOG FLEXPEN) 100 UNIT/ML injection [Natalie Russell MD]  potassium chloride SA (K-DUR/KLOR-CON M) 20 MEQ CR tablet [Natalie Russell MD]  OLANZapine (ZYPREXA) 2.5 MG tablet [Natalie Russell MD]  omeprazole (PRILOSEC) 40 MG capsule [Natalie Russell MD]  tamsulosin (FLOMAX) 0.4 MG capsule [Natalie Russell MD]  simvastatin (ZOCOR) 20 MG tablet [Natalie Russell MD]  aspirin (ASPIRIN LOW DOSE) 81 MG EC tablet [Natalie Russell MD]  carvedilol (COREG) 12.5 MG tablet [Natalie Russell MD]  spironolactone (ALDACTONE) 25 MG tablet [Natalie Russell MD]  insulin pen needle (BD VIKTORIA U/F) 32G X 4 MM [Natalie Russell MD]    Preferred pharmacy: [id number] - [place], [place] - [address]    Comment:      Medication renewals requested in this message routed to other providers:  dapagliflozin (FARXIGA) 10 MG TABS tablet [Jennifer Wilcox MD]  rifaximin (XIFAXAN) 550 MG TABS tablet [Santi Banuelos MD]  lactulose (CHRONULAC) 10 GM/15ML solution [Santi Banuelos MD]  insulin degludec (TRESIBA) 200 UNIT/ML pen [Cecilia Bustamante MD]

## 2017-10-16 ENCOUNTER — CARE COORDINATION (OUTPATIENT)
Dept: OTOLARYNGOLOGY | Facility: CLINIC | Age: 52
End: 2017-10-16

## 2017-10-16 ENCOUNTER — OFFICE VISIT (OUTPATIENT)
Dept: GASTROENTEROLOGY | Facility: CLINIC | Age: 52
End: 2017-10-16
Attending: INTERNAL MEDICINE
Payer: MEDICARE

## 2017-10-16 ENCOUNTER — TELEPHONE (OUTPATIENT)
Dept: GASTROENTEROLOGY | Facility: CLINIC | Age: 52
End: 2017-10-16

## 2017-10-16 ENCOUNTER — OFFICE VISIT (OUTPATIENT)
Dept: ENDOCRINOLOGY | Facility: CLINIC | Age: 52
End: 2017-10-16

## 2017-10-16 VITALS
WEIGHT: 190 LBS | HEIGHT: 69 IN | BODY MASS INDEX: 28.14 KG/M2 | SYSTOLIC BLOOD PRESSURE: 120 MMHG | DIASTOLIC BLOOD PRESSURE: 72 MMHG | TEMPERATURE: 98.2 F | HEART RATE: 78 BPM

## 2017-10-16 VITALS
SYSTOLIC BLOOD PRESSURE: 120 MMHG | BODY MASS INDEX: 28.14 KG/M2 | WEIGHT: 190 LBS | HEART RATE: 78 BPM | DIASTOLIC BLOOD PRESSURE: 72 MMHG | HEIGHT: 69 IN

## 2017-10-16 DIAGNOSIS — K75.81 LIVER CIRRHOSIS SECONDARY TO NASH (H): ICD-10-CM

## 2017-10-16 DIAGNOSIS — E11.65 TYPE 2 DIABETES MELLITUS WITH HYPERGLYCEMIA, WITH LONG-TERM CURRENT USE OF INSULIN (H): ICD-10-CM

## 2017-10-16 DIAGNOSIS — K74.69 OTHER CIRRHOSIS OF LIVER (H): Primary | ICD-10-CM

## 2017-10-16 DIAGNOSIS — K75.81 NONALCOHOLIC STEATOHEPATITIS (NASH): ICD-10-CM

## 2017-10-16 DIAGNOSIS — E78.5 HYPERLIPIDEMIA LDL GOAL <100: Primary | ICD-10-CM

## 2017-10-16 DIAGNOSIS — K74.69 OTHER CIRRHOSIS OF LIVER (H): ICD-10-CM

## 2017-10-16 DIAGNOSIS — K74.60 LIVER CIRRHOSIS SECONDARY TO NASH (H): ICD-10-CM

## 2017-10-16 DIAGNOSIS — Z79.4 TYPE 2 DIABETES MELLITUS WITH HYPERGLYCEMIA, WITH LONG-TERM CURRENT USE OF INSULIN (H): ICD-10-CM

## 2017-10-16 LAB
ALBUMIN SERPL-MCNC: 3 G/DL (ref 3.4–5)
ALP SERPL-CCNC: 136 U/L (ref 40–150)
ALT SERPL W P-5'-P-CCNC: 48 U/L (ref 0–70)
ANION GAP SERPL CALCULATED.3IONS-SCNC: 7 MMOL/L (ref 3–14)
AST SERPL W P-5'-P-CCNC: 58 U/L (ref 0–45)
BACTERIA SPEC CULT: ABNORMAL
BILIRUB DIRECT SERPL-MCNC: 0.3 MG/DL (ref 0–0.2)
BILIRUB SERPL-MCNC: 1.2 MG/DL (ref 0.2–1.3)
BUN SERPL-MCNC: 15 MG/DL (ref 7–30)
CALCIUM SERPL-MCNC: 8.8 MG/DL (ref 8.5–10.1)
CHLORIDE SERPL-SCNC: 107 MMOL/L (ref 94–109)
CO2 SERPL-SCNC: 24 MMOL/L (ref 20–32)
COPATH REPORT: NORMAL
CREAT SERPL-MCNC: 0.94 MG/DL (ref 0.66–1.25)
ERYTHROCYTE [DISTWIDTH] IN BLOOD BY AUTOMATED COUNT: 13.2 % (ref 10–15)
GFR SERPL CREATININE-BSD FRML MDRD: 84 ML/MIN/1.7M2
GLUCOSE SERPL-MCNC: 225 MG/DL (ref 70–99)
HBA1C MFR BLD: 7.6 % (ref 4.3–6)
HCT VFR BLD AUTO: 34.1 % (ref 40–53)
HGB BLD-MCNC: 11.4 G/DL (ref 13.3–17.7)
INR PPP: 1.32 (ref 0.86–1.14)
MCH RBC QN AUTO: 35.1 PG (ref 26.5–33)
MCHC RBC AUTO-ENTMCNC: 33.4 G/DL (ref 31.5–36.5)
MCV RBC AUTO: 105 FL (ref 78–100)
PLATELET # BLD AUTO: 41 10E9/L (ref 150–450)
POTASSIUM SERPL-SCNC: 4.3 MMOL/L (ref 3.4–5.3)
PROT SERPL-MCNC: 7.2 G/DL (ref 6.8–8.8)
RBC # BLD AUTO: 3.25 10E12/L (ref 4.4–5.9)
SODIUM SERPL-SCNC: 138 MMOL/L (ref 133–144)
SPECIMEN SOURCE: ABNORMAL
WBC # BLD AUTO: 3 10E9/L (ref 4–11)

## 2017-10-16 PROCEDURE — 85610 PROTHROMBIN TIME: CPT | Performed by: INTERNAL MEDICINE

## 2017-10-16 PROCEDURE — 80076 HEPATIC FUNCTION PANEL: CPT | Performed by: INTERNAL MEDICINE

## 2017-10-16 PROCEDURE — 80048 BASIC METABOLIC PNL TOTAL CA: CPT | Performed by: INTERNAL MEDICINE

## 2017-10-16 PROCEDURE — 36415 COLL VENOUS BLD VENIPUNCTURE: CPT | Performed by: INTERNAL MEDICINE

## 2017-10-16 PROCEDURE — 99213 OFFICE O/P EST LOW 20 MIN: CPT | Mod: ZF

## 2017-10-16 PROCEDURE — 85027 COMPLETE CBC AUTOMATED: CPT | Performed by: INTERNAL MEDICINE

## 2017-10-16 RX ORDER — PRAVASTATIN SODIUM 10 MG
20 TABLET ORAL DAILY
Qty: 90 TABLET | Refills: 3 | Status: SHIPPED | OUTPATIENT
Start: 2017-10-16 | End: 2018-05-19

## 2017-10-16 RX ORDER — DAPAGLIFLOZIN 10 MG/1
10 TABLET, FILM COATED ORAL DAILY
Qty: 90 TABLET | Refills: 3 | Status: SHIPPED | OUTPATIENT
Start: 2017-10-16 | End: 2018-08-23

## 2017-10-16 ASSESSMENT — PAIN SCALES - GENERAL
PAINLEVEL: MODERATE PAIN (5)
PAINLEVEL: NO PAIN (0)

## 2017-10-16 NOTE — LETTER
10/16/2017       RE: Frandy Workman  [address]   [facility] [address]     Dear Colleague,    Thank you for referring your patient, Frandy Workman, to the [facility] HEPATOLOGY at [facility]. Please see a copy of my visit note below.    [facility]    Hepatology follow-up    Follow-up visit for cirrhosis    Subjective:  52 year old male    Cirrhosis  - dx 2013  - ESTRADA, A1-AT phenotype- PiMZ  - hx HE  - hx ascites  - no hx variceal bleed  - last EGD Nov 2016- gd I EV, portal hypertensive gastropathy  - HCC screening- Abd U/S Dec 2016    Comes to clinic this AM for follow-up of cirrhosis.  Since last clinic visit, patient developed a swelling of right parotid gland.  He underwent FNA of right parotid mass 10/11/17 which did not rule in malignancy.  He is scheduled for excision of rigt parotid mass and lymph node dissection later this month.  Patient was started on Farxiga for DM.  He was recently at the dentist.  No ER visits or hospital admissions since last clinic visit.    Patient is well.  He denies any signs or symptoms specific to liver disease.    He denies any confusion.  He is taking lactulose and rifaximin as prescribed.    He denies abdominal distension or lower extremity edema.  He continues to take a low dose of spironolactone    Patient denies jaundice, melena, hematemesis or hematochezia.    Patient denies fevers, sweats or chills.  Weight stable.    Patient's blood sugars range from 140-180.  Last hemoglobin A1c= 6.5% in June.    Patient has already had his annual flu shot.    Patient does not drink alcohol.  He continues to undergo legal proceedings for custody of his daughter.  He is going to [place] later this month for a wedding and a football game.      Medical hx Surgical hx   Past Medical History:   Diagnosis Date     Anemia 2013     BPH (benign prostatic hyperplasia)      Cholelithiasis      Conductive hearing loss 8/16/2017      Depressive disorder 1986     Gastroesophageal reflux disease 12/1/2014     Hepatitis 2014     Hyperlipidemia      Liver cirrhosis secondary to ESTRADA (H)      Type II diabetes mellitus (H)       Past Surgical History:   Procedure Laterality Date     ESOPHAGOSCOPY, GASTROSCOPY, DUODENOSCOPY (EGD), COMBINED N/A 11/17/2016    Procedure: COMBINED ESOPHAGOSCOPY, GASTROSCOPY, DUODENOSCOPY (EGD);  Surgeon: Santi Rosas MD;  Location:  GI     KNEE SURGERY Left           Medications  Prior to Admission medications    Medication Sig Start Date End Date Taking? Authorizing Provider   blood glucose monitoring (ACCU-CHEK EDNISON PLUS) test strip Use to test blood sugar 4 times daily 10/13/17  Yes Natalie Chun MD   blood glucose monitoring (ACCU-CHEK FASTCLIX) lancets Use to test blood sugar 4 times daily or as directed.  1 box = 102 lancets 10/13/17  Yes Natalie Chun MD   rifaximin (XIFAXAN) 550 MG TABS tablet Take 1 tablet (550 mg) by mouth 2 times daily 10/13/17  Yes Santi Rosas MD   lactulose (CHRONULAC) 10 GM/15ML solution Take 45 mLs (30 g) by mouth 4 times daily 10/13/17  Yes Santi Rosas MD   dapagliflozin (FARXIGA) 10 MG TABS tablet Take 1 tablet (10 mg) by mouth daily 10/12/17  Yes Jennifer Wilcox MD   insulin degludec (TRESIBA) 200 UNIT/ML pen Take 120 units daily. 10/12/17  Yes Jennifer Wilcox MD   spironolactone (ALDACTONE) 25 MG tablet Take 1 tablet (25 mg) by mouth daily 10/5/17  Yes Natalie Chun MD   insulin pen needle (BD VIKTORIA U/F) 32G X 4 MM Use as directed. 10/5/17  Yes Natalie Chun MD   carvedilol (COREG) 12.5 MG tablet Take 1 tablet (12.5 mg) by mouth 2 times daily (with meals) 10/4/17  Yes Natalie Chun MD   simvastatin (ZOCOR) 20 MG tablet Take 1 tablet (20 mg) by mouth At Bedtime 9/29/17  Yes Natalie Chun MD   aspirin (ASPIRIN LOW DOSE) 81 MG EC  "tablet Take 1 tablet (81 mg) by mouth daily 9/29/17  Yes Natalie Chun MD   amoxicillin (AMOXIL) 500 MG capsule TK ONE C PO TID TAT 9/27/17  Yes Reported, Patient   omeprazole (PRILOSEC) 40 MG capsule Take 1 capsule (40 mg) by mouth daily 9/20/17  Yes Natalie Chun MD   tamsulosin (FLOMAX) 0.4 MG capsule Take 1 capsule (0.4 mg) by mouth daily 9/20/17  Yes Natalie Chun MD   cephALEXin (KEFLEX) 500 MG capsule Take 1 capsule (500 mg) by mouth 3 times daily 9/20/17  Yes Washington Rueda MD   OLANZapine (ZYPREXA) 2.5 MG tablet Take 1 tablet (2.5 mg) by mouth At Bedtime 8/18/17  Yes Natalie Chun MD   potassium chloride SA (K-DUR/KLOR-CON M) 20 MEQ CR tablet Take 1 tablet (20 mEq) by mouth daily 6/22/17  Yes Natalie Chun MD   Cholecalciferol (VITAMIN D-3 PO) Take 2,000 Units by mouth   Yes Reported, Patient   insulin aspart (NOVOLOG FLEXPEN) 100 UNIT/ML injection 1 unit per 4 Gms CHO at meals and snacks + correction scale of 1 unit per 25 mg/dL over 125. Average daily dose is 75 units. 1/16/17  Yes Natalie Chun MD   cyanocobalamin (RA VITAMIN B-12 TR) 1000 MCG TBCR Take 1,000 mcg by mouth daily 3/14/16  Yes Reported, Patient   Multiple Vitamin (THERAVITE PO) Take 1 tablet by mouth daily 3/14/16  Yes Reported, Patient       Allergies  Allergies   Allergen Reactions     Codeine Other (See Comments)     Cannot take due to liver         Review of systems  A 10-point review of systems was negative    Examination  /72  Pulse 78  Temp 98.2  F (36.8  C) (Oral)  Ht 1.753 m (5' 9\")  Wt 86.2 kg (190 lb)  BMI 28.06 kg/m2  Body mass index is 28.06 kg/(m^2).    Gen- well, NAD, A+Ox3, normal color  CVS- RRR, soft systolic murmur in aortic area- no radiation  RS- CTA  Chest wall- bilateral gynecomastia  Abd- obese, striae, soft, non-tender, ?palpable spleen tip, no ascites on palpation or percussion, BS+  Extr- hands normal, " no LEILA  Skin- no rash or jaundice  Neuro- no asterixis  Psych- normal mood    Laboratory  Lab Results   Component Value Date     10/16/2017    POTASSIUM 4.3 10/16/2017    CHLORIDE 107 10/16/2017    CO2 24 10/16/2017    BUN 15 10/16/2017    CR 0.94 10/16/2017       Lab Results   Component Value Date    BILITOTAL 1.2 10/16/2017    ALT 48 10/16/2017    AST 58 10/16/2017    ALKPHOS 136 10/16/2017       Lab Results   Component Value Date    ALBUMIN 3.0 10/16/2017    PROTTOTAL 7.2 10/16/2017        Lab Results   Component Value Date    WBC 3.7 09/27/2017    HGB 10.5 09/27/2017     09/27/2017    PLT 50 09/27/2017       Lab Results   Component Value Date    INR 1.32 10/16/2017     FNA 10/11/17 reviewed    Radiology  CT soft tissue neck 9/20/17 reviewed    Assessment  52 year old male who presents for follow-up of history of decompensated ESTRADA/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  MELD-Na= 10 (stable).  Hepatic encephalopathy well-controlled on current medications.  Ascites well-controlled on low dose diuretics.  Due for EGD for surveillance of esophageal varices.  Due for HCC screening in Dec 2017.      Patient's liver disease is relatively compensated (Child class A) therefore no clear contraindications to surgical excision of parotid gland particularly given the suspicion for malignancy.    Plan  1.  Continue spironolactone 25mg PO Q24  2.  Low salt diet  3.  Continue lactulose  4.  Continue rifaximin  5.  EGD  6.  Abd U/S in Dec 2017  7.  Keep platelets>50 in mikaela-operative period  8.  No need for FFP mikaela-operatively as INR<1.7  9.  Follow-up in 6 months    Satni Banuelos MD  Hepatology  [facility]

## 2017-10-16 NOTE — PATIENT INSTRUCTIONS
Test blood sugars premeals and send me report for the week.     Continue Tresiba at 110 units daily once you start Farxiga 10 mg daily    Please continue Aspart insulin (Novolog) with meals at the ratio of 1 unit per 4 gm carb with correction.

## 2017-10-16 NOTE — PROGRESS NOTES
Reviewed pathology results with patient. Dr. Morgan would like patient to continue with plan for PAC appointment and return to clinic to discuss surgery with her on Wednesday. Patient advised that we would plan for a parotidectomy and possible neck dissection depending on the intraoperative frozen results. Patient agreeable to plan and will call with further questions or concerns.     Gay Jamil, RN, BSN

## 2017-10-16 NOTE — MR AVS SNAPSHOT
After Visit Summary   10/16/2017    Frandy Workman    MRN: [id number]           Patient Information     Date Of Birth          1965        Visit Information        Provider Department      10/16/2017 1:00 PM Santi Rosas MD [facility] Hepatology        Today's Diagnoses     Other cirrhosis of liver (H)    -  1       Follow-ups after your visit        Follow-up notes from your care team     Return in about 6 months (around 4/16/2018).      Your next 10 appointments already scheduled     Oct 16, 2017  2:00 PM CDT   (Arrive by 1:45 PM)   RETURN ENDOCRINE with Jennifer Wilcox MD   [facility] Endocrinology ([facility] [place])    [address]  3rd [facility] [id number] [phone]            Oct 18, 2017  1:30 PM CDT   (Arrive by 1:15 PM)   PAC EVALUATION with  Pac Robinson 6   [facility] Preoperative Assessment Center ([facility] [place])    [address] [place] [address]  4th [facility] [id number] [phone]            Oct 18, 2017  2:30 PM CDT   (Arrive by 2:15 PM)   PAC RN ASSESSMENT with  Pac Rn   [facility] Preoperative [facility] ([facility] [place])    [address]  4th [facility] [id number] [phone]            Oct 18, 2017  3:30 PM CDT   (Arrive by 3:15 PM)   PAC Anesthesia Consult with  Pac Anesthesiologist   [facility] Pre[facility] ([facility] [place])    [address] [place] [id number] [phone]            Oct 18, 2017  4:20 PM CDT   (Arrive by 4:05 PM)   Return Visit with Asiya Morgan MD   [facility] Ear Nose and Throat ([facility] [place])    [address]  4th [facility] [address]   [phone]            Dec 11, 2017  2:00 PM CST   (Arrive by 1:45 PM)   Return Visit with Natalie Russell MD   [facility] ([facility] [place])    [address] [place] [address]   [phone]            Dec 13, 2017 10:00 AM CST   US ABDOMEN COMPLETE with US1   [facility] Imaging [facility] US ([facility])    [address] [place] [id number] [phone]           Please bring a list of your medicines (including vitamins, minerals and over-the-counter drugs). Also, tell your doctor about any allergies you may have. Wear comfortable clothes and leave your valuables at home.  Adults: No eating or drinking for 8 hours before the exam. You may take medicine with a small sip of water.  Children: - Children 6+ years: No food or drink for 6 hours before exam. - Children 1-5 years: No food or drink for 4 hours before exam. - Infants, breast-fed: may have breast milk up to 2 hours before exam. - Infants, formula: may have bottle until 4 hours before exam.  Please call the Imaging Department at your exam site with any questions.            Mar 08, 2018 12:30 PM CST   (Arrive by 12:15 PM)   Return Visit with Lamin Gonzalez DPM   [facility] Endocrinology ([facility])    [address] [place] [id number] [phone]            Apr 16, 2018 12:00 PM CDT   Lab with  LAB   [facility] Lab ([facility])    [address] [place] [address]   [phone]            Apr 16, 2018  1:00 PM CDT   (Arrive by 12:45 PM)   Return General Liver with Santi Dotson MD   [facility] Hepatology ([facility])    [address] [place] [address]   [phone]              Future tests that were ordered for you today     Open Future Orders        Priority Expected Expires Ordered    US Abdomen Complete Routine 12/16/2017 10/16/2018 10/16/2017    UPPER GI ENDOSCOPY Routine  11/30/2017 10/16/2017            Who to contact     If you have  "questions or need follow up information about today's clinic visit or your schedule please contact [facility] HEPATOLOGY directly at [phone].  Normal or non-critical lab and imaging results will be communicated to you by MyChart, letter or phone within 4 business days after the clinic has received the results. If you do not hear from us within 7 days, please contact the clinic through [employer] or phone. If you have a critical or abnormal lab result, we will notify you by phone as soon as possible.  Submit refill requests through [employer] or call your pharmacy and they will forward the refill request to us. Please allow 3 business days for your refill to be completed.          Additional Information About Your Visit        INCIDE Information     [employer]IDE gives you secure access to your electronic health record. If you see a primary care provider, you can also send messages to your care team and make appointments. If you have questions, please call your primary care clinic.  If you do not have a primary care provider, please call [phone] and they will assist you.        Care EveryWhere ID     This is your Care EveryWhere ID. This could be used by other organizations to access your [facility] medical records  [id number]        Your Vitals Were     Pulse Temperature Height BMI (Body Mass Index)          78 98.2  F (36.8  C) (Oral) 1.753 m (5' 9\") 28.06 kg/m2         Blood Pressure from Last 3 Encounters:   10/16/17 120/72   10/11/17 102/54   10/11/17 93/60    Weight from Last 3 Encounters:   10/16/17 86.2 kg (190 lb)   10/11/17 85.3 kg (188 lb 0.8 oz)   09/29/17 85.3 kg (188 lb)               Primary Care Provider Office Phone # Fax #    Natalie Mitzi Russell [phone]       [address] [place] [address] [address] [place] [address]        Equal Access to Services     MINDI RODRIGUEZ AH: Amelia huntero Somichael, waaxda luqadaha, qaybta kaalemy herrera. " So [facility] [phone].    ATENCIÓN: Si bob bruce, tiene a hanley disposición servicios gratuitos de asistencia lingüística. Rl peters [phone].    We comply with applicable federal civil rights laws and [place] laws. We do not discriminate on the basis of race, color, national origin, age, disability, sex, sexual orientation, or gender identity.            Thank you!     Thank you for choosing [facility] HEPATOLOGY  for your care. Our goal is always to provide you with excellent care. Hearing back from our patients is one way we can continue to improve our services. Please take a few minutes to complete the written survey that you may receive in the mail after your visit with us. Thank you!             Your Updated Medication List - Protect others around you: Learn how to safely use, store and throw away your medicines at www.disposemymeds.org.          This list is accurate as of: 10/16/17  1:39 PM.  Always use your most recent med list.                   Brand Name Dispense Instructions for use Diagnosis    amoxicillin 500 MG capsule    AMOXIL     TK ONE C PO TID TAT        aspirin 81 MG EC tablet    ASPIRIN LOW DOSE    90 tablet    Take 1 tablet (81 mg) by mouth daily    Type 2 diabetes mellitus without complication, with long-term current use of insulin (H)       blood glucose monitoring lancets     408 each    Use to test blood sugar 4 times daily or as directed.  1 box = 102 lancets    Type II diabetes mellitus (H)       blood glucose monitoring test strip    ACCU-CHEK EDINSON PLUS    400 each    Use to test blood sugar 4 times daily    Type II diabetes mellitus (H)       carvedilol 12.5 MG tablet    COREG    180 tablet    Take 1 tablet (12.5 mg) by mouth 2 times daily (with meals)    Liver cirrhosis secondary to ESTRADA (H), Secondary esophageal varices without bleeding (H)       cephALEXin 500 MG capsule    KEFLEX    30 capsule    Take 1 capsule (500 mg) by mouth 3 times daily    Sialadenitis       dapagliflozin  10 MG Tabs tablet    FARXIGA    90 tablet    Take 1 tablet (10 mg) by mouth daily    Type 2 diabetes mellitus with hyperglycemia, with long-term current use of insulin (H)       insulin aspart 100 UNIT/ML injection    NovoLOG FLEXPEN    24 mL    1 unit per 4 Gms CHO at meals and snacks + correction scale of 1 unit per 25 mg/dL over 125. Average daily dose is 75 units.    Type II diabetes mellitus (H)       insulin degludec 200 UNIT/ML pen    TRESIBA    36 mL    Take 120 units daily.    Type 2 diabetes mellitus with hyperglycemia, with long-term current use of insulin (H)       insulin pen needle 32G X 4 MM    BD VIKTORIA U/F    100 each    Use as directed.    Type II diabetes mellitus (H)       lactulose 10 GM/15ML solution    CHRONULAC    7200 mL    Take 45 mLs (30 g) by mouth 4 times daily    Liver cirrhosis secondary to ESTRADA (H)       OLANZapine 2.5 MG tablet    zyPREXA    30 tablet    Take 1 tablet (2.5 mg) by mouth At Bedtime    Insomnia, unspecified type       omeprazole 40 MG capsule    priLOSEC    90 capsule    Take 1 capsule (40 mg) by mouth daily    Gastroesophageal reflux disease, esophagitis presence not specified       potassium chloride SA 20 MEQ CR tablet    K-DUR/KLOR-CON M    90 tablet    Take 1 tablet (20 mEq) by mouth daily    Hypokalemia       RA VITAMIN B-12 TR 1000 MCG Tbcr   Generic drug:  cyanocobalamin      Take 1,000 mcg by mouth daily        rifaximin 550 MG Tabs tablet    XIFAXAN    60 tablet    Take 1 tablet (550 mg) by mouth 2 times daily    Hepatic encephalopathy (H)       simvastatin 20 MG tablet    ZOCOR    90 tablet    Take 1 tablet (20 mg) by mouth At Bedtime    Hyperlipidemia LDL goal <100, Type 2 diabetes mellitus without complication, with long-term current use of insulin (H)       spironolactone 25 MG tablet    ALDACTONE    90 tablet    Take 1 tablet (25 mg) by mouth daily    Other ascites       tamsulosin 0.4 MG capsule    FLOMAX    90 capsule    Take 1 capsule (0.4 mg) by mouth  daily    Benign prostatic hyperplasia with lower urinary tract symptoms       THERAVITE PO      Take 1 tablet by mouth daily        VITAMIN D-3 PO      Take 2,000 Units by mouth

## 2017-10-16 NOTE — NURSING NOTE
Chief Complaint   Patient presents with     RECHECK     F/U TYPE II DM     Alisha Cotto, CMA  Endocrinology & Diabetes 3G    Capillary Fingerstick performed for an Hemoglobin A1C test

## 2017-10-16 NOTE — TELEPHONE ENCOUNTER
DATE:  10/16/2017   TIME OF RECEIPT FROM LAB:  1:10  LAB TEST:  Platelet  LAB VALUE:  41  RESULTS GIVEN WITH READ-BACK TO (PROVIDER):  LETTY FERNÁNDEZ  TIME LAB VALUE REPORTED TO PROVIDER:   1:11

## 2017-10-16 NOTE — PROGRESS NOTES
[facility]    Hepatology follow-up    Follow-up visit for cirrhosis    Subjective:  52 year old male    Cirrhosis  - dx 2013  - ESTRADA, A1-AT phenotype- PiMZ  - hx HE  - hx ascites  - no hx variceal bleed  - last EGD Nov 2016- gd I EV, portal hypertensive gastropathy  - HCC screening- Abd U/S Dec 2016    Comes to clinic this AM for follow-up of cirrhosis.  Since last clinic visit, patient developed a swelling of right parotid gland.  He underwent FNA of right parotid mass 10/11/17 which did not rule in malignancy.  He is scheduled for excision of rigt parotid mass and lymph node dissection later this month.  Patient was started on Farxiga for DM.  He was recently at the dentist.  No ER visits or hospital admissions since last clinic visit.    Patient is well.  He denies any signs or symptoms specific to liver disease.    He denies any confusion.  He is taking lactulose and rifaximin as prescribed.    He denies abdominal distension or lower extremity edema.  He continues to take a low dose of spironolactone    Patient denies jaundice, melena, hematemesis or hematochezia.    Patient denies fevers, sweats or chills.  Weight stable.    Patient's blood sugars range from 140-180.  Last hemoglobin A1c= 6.5% in June.    Patient has already had his annual flu shot.    Patient does not drink alcohol.  He continues to undergo legal proceedings for custody of his daughter.  He is going to [place] later this month for a wedding and a football game.      Medical hx Surgical hx   Past Medical History:   Diagnosis Date     Anemia 2013     BPH (benign prostatic hyperplasia)      Cholelithiasis      Conductive hearing loss 8/16/2017     Depressive disorder 1986     Gastroesophageal reflux disease 12/1/2014     Hepatitis 2014     Hyperlipidemia      Liver cirrhosis secondary to ESTRADA (H)      Type II diabetes mellitus (H)       Past Surgical History:   Procedure Laterality Date     ESOPHAGOSCOPY, GASTROSCOPY,  DUODENOSCOPY (EGD), COMBINED N/A 11/17/2016    Procedure: COMBINED ESOPHAGOSCOPY, GASTROSCOPY, DUODENOSCOPY (EGD);  Surgeon: Santi Rosas MD;  Location: [facility]     KNEE SURGERY Left           Medications  Prior to Admission medications    Medication Sig Start Date End Date Taking? Authorizing Provider   blood glucose monitoring (ACCU-CHEK EDINSON PLUS) test strip Use to test blood sugar 4 times daily 10/13/17  Yes Natalie Chun MD   blood glucose monitoring (ACCU-CHEK FASTCLIX) lancets Use to test blood sugar 4 times daily or as directed.  1 box = 102 lancets 10/13/17  Yes Natalie Chun MD   rifaximin (XIFAXAN) 550 MG TABS tablet Take 1 tablet (550 mg) by mouth 2 times daily 10/13/17  Yes Santi Rosas MD   lactulose (CHRONULAC) 10 GM/15ML solution Take 45 mLs (30 g) by mouth 4 times daily 10/13/17  Yes Santi Rosas MD   dapagliflozin (FARXIGA) 10 MG TABS tablet Take 1 tablet (10 mg) by mouth daily 10/12/17  Yes Jennifer Wilcox MD   insulin degludec (TRESIBA) 200 UNIT/ML pen Take 120 units daily. 10/12/17  Yes Jennifer Wilcox MD   spironolactone (ALDACTONE) 25 MG tablet Take 1 tablet (25 mg) by mouth daily 10/5/17  Yes Natalie Chun MD   insulin pen needle (BD VIKTORIA U/F) 32G X 4 MM Use as directed. 10/5/17  Yes Natalie Chun MD   carvedilol (COREG) 12.5 MG tablet Take 1 tablet (12.5 mg) by mouth 2 times daily (with meals) 10/4/17  Yes Natalie Chun MD   simvastatin (ZOCOR) 20 MG tablet Take 1 tablet (20 mg) by mouth At Bedtime 9/29/17  Yes Natalie Chun MD   aspirin (ASPIRIN LOW DOSE) 81 MG EC tablet Take 1 tablet (81 mg) by mouth daily 9/29/17  Yes Natalie Chun MD   amoxicillin (AMOXIL) 500 MG capsule TK ONE C PO TID TAT 9/27/17  Yes Reported, Patient   omeprazole (PRILOSEC) 40 MG capsule Take 1 capsule (40 mg) by mouth daily 9/20/17  Yes Andrés Russell,  "Natalie Cartagena MD   tamsulosin (FLOMAX) 0.4 MG capsule Take 1 capsule (0.4 mg) by mouth daily 9/20/17  Yes Natalie Chun MD   cephALEXin (KEFLEX) 500 MG capsule Take 1 capsule (500 mg) by mouth 3 times daily 9/20/17  Yes Washington Rueda MD   OLANZapine (ZYPREXA) 2.5 MG tablet Take 1 tablet (2.5 mg) by mouth At Bedtime 8/18/17  Yes Natalie Chun MD   potassium chloride SA (K-DUR/KLOR-CON M) 20 MEQ CR tablet Take 1 tablet (20 mEq) by mouth daily 6/22/17  Yes Natalie Chun MD   Cholecalciferol (VITAMIN D-3 PO) Take 2,000 Units by mouth   Yes Reported, Patient   insulin aspart (NOVOLOG FLEXPEN) 100 UNIT/ML injection 1 unit per 4 Gms CHO at meals and snacks + correction scale of 1 unit per 25 mg/dL over 125. Average daily dose is 75 units. 1/16/17  Yes Natalie Chun MD   cyanocobalamin (RA VITAMIN B-12 TR) 1000 MCG TBCR Take 1,000 mcg by mouth daily 3/14/16  Yes Reported, Patient   Multiple Vitamin (THERAVITE PO) Take 1 tablet by mouth daily 3/14/16  Yes Reported, Patient       Allergies  Allergies   Allergen Reactions     Codeine Other (See Comments)     Cannot take due to liver         Review of systems  A 10-point review of systems was negative    Examination  /72  Pulse 78  Temp 98.2  F (36.8  C) (Oral)  Ht 1.753 m (5' 9\")  Wt 86.2 kg (190 lb)  BMI 28.06 kg/m2  Body mass index is 28.06 kg/(m^2).    Gen- well, NAD, A+Ox3, normal color  CVS- RRR, soft systolic murmur in aortic area- no radiation  RS- CTA  Chest wall- bilateral gynecomastia  Abd- obese, striae, soft, non-tender, ?palpable spleen tip, no ascites on palpation or percussion, BS+  Extr- hands normal, no LEILA  Skin- no rash or jaundice  Neuro- no asterixis  Psych- normal mood    Laboratory  Lab Results   Component Value Date     10/16/2017    POTASSIUM 4.3 10/16/2017    CHLORIDE 107 10/16/2017    CO2 24 10/16/2017    BUN 15 10/16/2017    CR 0.94 10/16/2017       Lab Results "   Component Value Date    BILITOTAL 1.2 10/16/2017    ALT 48 10/16/2017    AST 58 10/16/2017    ALKPHOS 136 10/16/2017       Lab Results   Component Value Date    ALBUMIN 3.0 10/16/2017    PROTTOTAL 7.2 10/16/2017        Lab Results   Component Value Date    WBC 3.7 09/27/2017    HGB 10.5 09/27/2017     09/27/2017    PLT 50 09/27/2017       Lab Results   Component Value Date    INR 1.32 10/16/2017     FNA 10/11/17 reviewed    Radiology  CT soft tissue neck 9/20/17 reviewed    Assessment  52 year old male who presents for follow-up of history of decompensated ESTRADA/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  MELD-Na= 10 (stable).  Hepatic encephalopathy well-controlled on current medications.  Ascites well-controlled on low dose diuretics.  Due for EGD for surveillance of esophageal varices.  Due for HCC screening in Dec 2017.      Patient's liver disease is relatively compensated (Child class A) therefore no clear contraindications to surgical excision of parotid gland particularly given the suspicion for malignancy.    Plan  1.  Continue spironolactone 25mg PO Q24  2.  Low salt diet  3.  Continue lactulose  4.  Continue rifaximin  5.  EGD  6.  Abd U/S in Dec 2017  7.  Keep platelets>50 in mikaela-operative period  8.  No need for FFP mikaela-operatively as INR<1.7  9.  Follow-up in 6 months    Santi Banuelos MD  Hepatology  [facility]

## 2017-10-16 NOTE — LETTER
10/16/2017       RE: Frandy Workman  [address]   [facility] [id number]     Dear Colleague,    Thank you for referring your patient, Frandy Workman, to the [facility]. Please see a copy of my visit note below.      Assessment/Treatment Plan:      Frandy Workman is a 51 year old year old male with    1. Type 2 Diabetes since early 30s with hyperglycemia and neuropathy.     Regmen  Tresiba 110 units daily   Novolog 1 unit per 4 gm carb plus 2 unit per 25 over 100 (start at meals again)   Farxiga 10 mg daily (restart)    A1c today was 7.6 but has hyperglycemia, A1c seems falsely lower than the BG levels.   A1c is not a reliable indicator of glycemic control: Anemia, does not correlate with BG levels.     Barriers: Stress eating ( having issues with getting her 16 year old daughter to live with him.)  Asked to bolus with meals and snack.     No using prandial aspart  BG testing 3 times a day, with meal time and correction insulin   He will restart doing this    Ran out of Farxiga.    Once refilled he will start using it again.   Had not used it for 2 weeks, and using every other day for months.     Hyperlipidemia Discontinue Simvastatin.   Change to Pravastatin 20 mg daily   Check LDL on follow up.     Jennifer Wilcox MD  [id number]  Endocrinology Service        =================================================    Endocrinology Clinic Visit    Chief Complaint: RECHECK (F/U TYPE II DM)       Subjective:         HPI: Frandy Workman is a 51 year old year old male with history of ESTRADA with liver cirrhosis who is seen in follow up for T2DM - uncontrolled. He moved from [place] to [place] to be close to his DTR and his BG has been uncontrolled now.     He has had diabetes since 2001. He was intially started on Metformin but after diagnosis of liver failure, this was discontinued.     Neuropathy - tingling in feet bilateral   Nephropathy - none  Retinopathy - no  per patient (exam 2016)   Hypoglycemia - none    Prevention  On Statin.     Has polyphagia, no polydipsia or polyuria.   No change in Bowel habits. On lactulose   Appetite increased since moving to [place]  Weight gain  No neck pain or difficulty swallowing.     Barriers: He has overcome most of his barriers.   A1c is not a reliable indicator of glycemic control: Anemia, does not correlate with BG levels.   BG is high all day long: diet with high carb in it. He avoided snacking and lunches  ?? Poor absorption of Lantus high higher dose. -- This was changed to Tresiba  Lack of oral medication use due to liver cirrhosis. Responded well to Tresiba      Download from her meter shows:  Average 263, sd 86  Recent values mostly high 200s or low 300s in am, improved later in the day.       Insulin regimen  Tresiba 110 units daily  Aspart 1 units per 4 gm CHO (not doing this at lunch because he thought he did not need it)   Correction two units per 25 mg/dL over 100.    Exercise  He is walking 3-6 months daily.         Allergies   Allergen Reactions     Codeine Other (See Comments)     Cannot take due to liver         Current Outpatient Prescriptions   Medication Sig Dispense Refill     blood glucose monitoring (ACCU-CHEK EDINSON PLUS) test strip Use to test blood sugar 4 times daily 400 each 3     blood glucose monitoring (ACCU-CHEK FASTCLIX) lancets Use to test blood sugar 4 times daily or as directed.  1 box = 102 lancets 408 each 3     rifaximin (XIFAXAN) 550 MG TABS tablet Take 1 tablet (550 mg) by mouth 2 times daily 60 tablet 11     lactulose (CHRONULAC) 10 GM/15ML solution Take 45 mLs (30 g) by mouth 4 times daily 7200 mL 11     dapagliflozin (FARXIGA) 10 MG TABS tablet Take 1 tablet (10 mg) by mouth daily 90 tablet 3     insulin degludec (TRESIBA) 200 UNIT/ML pen Take 120 units daily. 36 mL 3     spironolactone (ALDACTONE) 25 MG tablet Take 1 tablet (25 mg) by mouth daily 90 tablet 1     insulin pen needle (BD VIKTORIA U/F)  32G X 4 MM Use as directed. 100 each 11     carvedilol (COREG) 12.5 MG tablet Take 1 tablet (12.5 mg) by mouth 2 times daily (with meals) 180 tablet 3     simvastatin (ZOCOR) 20 MG tablet Take 1 tablet (20 mg) by mouth At Bedtime 90 tablet 3     aspirin (ASPIRIN LOW DOSE) 81 MG EC tablet Take 1 tablet (81 mg) by mouth daily 90 tablet 3     omeprazole (PRILOSEC) 40 MG capsule Take 1 capsule (40 mg) by mouth daily 90 capsule 2     OLANZapine (ZYPREXA) 2.5 MG tablet Take 1 tablet (2.5 mg) by mouth At Bedtime 30 tablet 5     potassium chloride SA (K-DUR/KLOR-CON M) 20 MEQ CR tablet Take 1 tablet (20 mEq) by mouth daily 90 tablet 3     Cholecalciferol (VITAMIN D-3 PO) Take 2,000 Units by mouth       insulin aspart (NOVOLOG FLEXPEN) 100 UNIT/ML injection 1 unit per 4 Gms CHO at meals and snacks + correction scale of 1 unit per 25 mg/dL over 125. Average daily dose is 75 units. 24 mL 11     cyanocobalamin (RA VITAMIN B-12 TR) 1000 MCG TBCR Take 1,000 mcg by mouth daily       Multiple Vitamin (THERAVITE PO) Take 1 tablet by mouth daily         Review of Systems   No eye symptoms  No headache, change in vision, loss of peripheral vision  No neck pain, no other lumps in the neck  No change in breathing    No change in swallowing.    No swelling in extremities  No change in mental status.    Other ROS that were obtained were negative.         Past Medical History:   Diagnosis Date     Anemia 2013    Low blood plates current is 37     BPH (benign prostatic hyperplasia)      Cholelithiasis      Conductive hearing loss 8/16/2017    Have a lump on my right side of my face.  Had wax discharge     Depressive disorder 1986    Suffer effects throughout life     Gastroesophageal reflux disease 12/1/2014    Being treated with Prilosac     Hepatitis 2014    Diagnosed with schrosis ESTRADA in 2014.  Suffer from hepatatie     Hyperlipidemia      Liver cirrhosis secondary to ESTRADA (H)      Type II diabetes mellitus (H)        Past Surgical  "History:   Procedure Laterality Date     ESOPHAGOSCOPY, GASTROSCOPY, DUODENOSCOPY (EGD), COMBINED N/A 2016    Procedure: COMBINED ESOPHAGOSCOPY, GASTROSCOPY, DUODENOSCOPY (EGD);  Surgeon: Santi Rosas MD;  Location:  GI     KNEE SURGERY Left        Family History   Problem Relation Age of Onset     Prostate Cancer Maternal Grandfather      Substance Abuse Maternal Grandfather      Alcohol     Colon Cancer Father 60     Pancreatic Cancer Father 60     Prostate Cancer Father      Colorectal Cancer Father      Macular Degeneration Father      CANCER Father      Colorectal Cancer Maternal Grandmother      CANCER Maternal Grandmother      Substance Abuse Maternal Grandmother      Alcohol     Colorectal Cancer Paternal Grandmother      CANCER Mother      DIABETES Mother       3/2016     CEREBROVASCULAR DISEASE Mother      Passed away in Feb of this year, 80 years old.     Thyroid Disease Mother      Depression Mother      Asthma Sister      Had since birth     Thyroid Disease Sister      Depression Sister      Liver Disease No family hx of        Social History     Social History     Marital Status:      Spouse Name: N/A     Number of Children: N/A     Years of Education: N/A     Social History Main Topics     Smoking status: Never Smoker      Smokeless tobacco: Current User      Comment: 1 tin per week     Alcohol Use: No      Comment: quit 1996     Drug Use: No     Sexual Activity: Not Asked     Other Topics Concern     None     Social History Narrative       Objective:   /72  Pulse 78  Ht 1.753 m (5' 9\")  Wt 86.2 kg (190 lb)  BMI 28.06 kg/m2  Constitutional: Appears well-developed and well-nourished. Active.   EYES: anicteric, normal extra-ocular movements, no lid lag or retraction   HEENT: Mouth/Throat: Mucous membrane is moist. Oropharynx is clear. No adenopathy. Normal thyroid   Cardiovascular: RRR, S1, S2 normal. No m/g/r   Pulmonary/Chest: CTAB. No wheezing or " rales   Abdominal: +BS. Distended.   Neurological: Alert.  Extremities: No clubbing, cyanosis or inflammation   Skin: normal texture, color  Feet: Tingling +. Sensation is intact.   Lymphatic: no cervical lymphadenopathy.  Psychological: appropriate mood     In House Labs:   Lab Results   Component Value Date    A1C 6.5 06/09/2017    A1C 7.8 10/25/2016          TSH   Date Value Ref Range Status   06/09/2017 0.42 0.40 - 4.00 mU/L Final       Creatinine   Date Value Ref Range Status   10/16/2017 0.94 0.66 - 1.25 mg/dL Final   ]    Recent Labs   Lab Test  10/25/16   1731   CHOL  164   HDL  33*   LDL  90   TRIG  205*

## 2017-10-16 NOTE — NURSING NOTE
"Chief Complaint   Patient presents with     RECHECK     follow up with cirrhosis, tzimmer cma       Initial /72  Pulse 78  Temp 98.2  F (36.8  C) (Oral)  Ht 1.753 m (5' 9\")  Wt 86.2 kg (190 lb)  BMI 28.06 kg/m2 Estimated body mass index is 28.06 kg/(m^2) as calculated from the following:    Height as of this encounter: 1.753 m (5' 9\").    Weight as of this encounter: 86.2 kg (190 lb).  Medication Reconciliation: complete    "

## 2017-10-16 NOTE — MR AVS SNAPSHOT
After Visit Summary   10/16/2017    Frandy Workman    MRN: [id number]           Patient Information     Date Of Birth          1965        Visit Information        Provider Department      10/16/2017 2:00 PM Jennifer Wilcox MD [facility]        Today's Diagnoses     Type 2 diabetes mellitus with hyperglycemia, with long-term current use of insulin (H)          Care Instructions    Test blood sugars premeals and send me report for the week.     Continue Tresiba at 110 units daily once you start Farxiga 10 mg daily    Please continue Aspart insulin (Novolog) with meals at the ratio of 1 unit per 4 gm carb with correction.               Follow-ups after your visit        Follow-up notes from your care team     Return in about 4 months (around 2/16/2018).      Your next 10 appointments already scheduled     Oct 18, 2017  1:30 PM CDT   (Arrive by 1:15 PM)   PAC EVALUATION with  Pac Robinson 6   [facility] ([facility] [place])    [address] [place] [address]   [phone]            Oct 18, 2017  2:30 PM CDT   (Arrive by 2:15 PM)   PAC RN ASSESSMENT with  Pac Rn   [facility] ([facility] [place])    [address] [place] [address]   [phone]            Oct 18, 2017  3:30 PM CDT   (Arrive by 3:15 PM)   PAC Anesthesia Consult with  Pac Anesthesiologist   [facility] ([facility] [place])    [address] [place] [address]   [phone]            Oct 18, 2017  4:20 PM CDT   (Arrive by 4:05 PM)   Return Visit with Asiya Morgan MD   [facility] Ear Nose and Throat ([facility] [place])    [address] [place] [address]   [phone]            Nov 17, 2017   Procedure with Santi Dotson MD   [facility],  [place], [facility] ([facility], [facility])    [address] [place] [address]   [phone]           The [facility] is located on the corner of [address] [facility] and [address] on the [facility]. It is easily accessible from virtually any point in the [place] area, via [address].            Dec 11, 2017  2:00 PM CST   (Arrive by 1:45 PM)   Return Visit with Natalie Russell MD   [facility] ([facility] [place])    [address]  4th [address]  [facility] [address] [phone]            Dec 13, 2017 10:00 AM CST   US ABDOMEN COMPLETE with UCUS1   [facility] US ([facility])    [address] [place] [address]  1st [facility] [address] [phone]           Please bring a list of your medicines (including vitamins, minerals and over-the-counter drugs). Also, tell your doctor about any allergies you may have. Wear comfortable clothes and leave your valuables at home.  Adults: No eating or drinking for 8 hours before the exam. You may take medicine with a small sip of water.  Children: - Children 6+ years: No food or drink for 6 hours before exam. - Children 1-5 years: No food or drink for 4 hours before exam. - Infants, breast-fed: may have breast milk up to 2 hours before exam. - Infants, formula: may have bottle until 4 hours before exam.  Please call the Imaging Department at your exam site with any questions.            Feb 21, 2018 12:00 PM CST   (Arrive by 11:45 AM)   RETURN ENDOCRINE with Jennifer Wilcox MD   [facility] Endocrinology ([facility] [place])    [address]  3rd [address]  [facility] [address] [phone]            Mar 08, 2018 12:30 PM CST   (Arrive by 12:15 PM)   Return Visit with Lamin Gonzalez DPM   [facility] Endocrinology ([facility])    [address] [facility] [address]  3rd [facility] [id number] [phone]            Apr 16, 2018 12:00 PM CDT   Lab with  LAB   [facility] ([facility])    [address]  [address]st [facility] [id number] [phone]              Future tests that were ordered for you today     Open Future Orders        Priority Expected Expires Ordered    US Abdomen Complete Routine 12/16/2017 10/16/2018 10/16/2017    UPPER GI ENDOSCOPY Routine  11/30/2017 10/16/2017            Who to contact     Please call your clinic at [phone] to:    Ask questions about your health    Make or cancel appointments    Discuss your medicines    Learn about your test results    Speak to your doctor   If you have compliments or concerns about an experience at your clinic, or if you wish to file a complaint, please contact [facility] Patient Relations at [phone] or email us at [email or web address]         Additional Information About Your Visit        [employer] Information     [employer] gives you secure access to your electronic health record. If you see a primary care provider, you can also send messages to your care team and make appointments. If you have questions, please call your primary care clinic.  If you do not have a primary care provider, please call [phone] and they will assist you.      [employer] is an electronic gateway that provides easy, online access to your medical records. With [employer], you can request a clinic appointment, read your test results, renew a prescription or communicate with your care team.     To access your existing account, please contact your [facility] or call [phone] for assistance.        Care EveryWhere ID     This is your Care EveryWhere ID. This could be used by other organizations to access your [facility] medical records  [id number]        Your Vitals Were     Pulse Height BMI (Body Mass  "Index)             78 1.753 m (5' 9\") 28.06 kg/m2          Blood Pressure from Last 3 Encounters:   10/16/17 120/72   10/16/17 120/72   10/11/17 102/54    Weight from Last 3 Encounters:   10/16/17 86.2 kg (190 lb)   10/16/17 86.2 kg (190 lb)   10/11/17 85.3 kg (188 lb 0.8 oz)              Today, you had the following     No orders found for display         Today's Medication Changes          These changes are accurate as of: 10/16/17  2:45 PM.  If you have any questions, ask your nurse or doctor.               Stop taking these medicines if you haven't already. Please contact your care team if you have questions.     amoxicillin 500 MG capsule   Commonly known as:  AMOXIL   Stopped by:  Jennifer Wilcox MD           cephALEXin 500 MG capsule   Commonly known as:  KEFLEX   Stopped by:  Jennifer Wilcox MD           tamsulosin 0.4 MG capsule   Commonly known as:  FLOMAX   Stopped by:  Jennifer Wilcox MD                Where to get your medicines      These medications were sent to [facility] [address] [place] [address] & [address], [place] [address]     Phone:  [phone]     dapagliflozin 10 MG Tabs tablet                Primary Care Provider Office Phone # Fax #    Natalie Mitzi Russell [phone]       [address] [place] [address] [address] [place] [address]        Equal Access to Services     [facility]: Hadii curly giraldo hadasho Somichael, waaxda luqadaha, qaybta kaalmada leonard, emy vuong. So [facility] [phone].    ATENCIÓN: Si habla español, tiene a hanley disposición servicios gratuitos de asistencia lingüística. Llame al [phone].    We comply with applicable federal civil rights laws and [place] laws. We do not discriminate on the basis of race, color, national origin, age, disability, sex, sexual orientation, or gender identity.            Thank you!     " Thank you for choosing [facility]  for your care. Our goal is always to provide you with excellent care. Hearing back from our patients is one way we can continue to improve our services. Please take a few minutes to complete the written survey that you may receive in the mail after your visit with us. Thank you!             Your Updated Medication List - Protect others around you: Learn how to safely use, store and throw away your medicines at www.disposemymeds.org.          This list is accurate as of: 10/16/17  2:45 PM.  Always use your most recent med list.                   Brand Name Dispense Instructions for use Diagnosis    aspirin 81 MG EC tablet    ASPIRIN LOW DOSE    90 tablet    Take 1 tablet (81 mg) by mouth daily    Type 2 diabetes mellitus without complication, with long-term current use of insulin (H)       blood glucose monitoring lancets     408 each    Use to test blood sugar 4 times daily or as directed.  1 box = 102 lancets    Type II diabetes mellitus (H)       blood glucose monitoring test strip    ACCU-CHEK EDINSON PLUS    400 each    Use to test blood sugar 4 times daily    Type II diabetes mellitus (H)       carvedilol 12.5 MG tablet    COREG    180 tablet    Take 1 tablet (12.5 mg) by mouth 2 times daily (with meals)    Liver cirrhosis secondary to ESTRADA (H), Secondary esophageal varices without bleeding (H)       dapagliflozin 10 MG Tabs tablet    FARXIGA    90 tablet    Take 1 tablet (10 mg) by mouth daily    Type 2 diabetes mellitus with hyperglycemia, with long-term current use of insulin (H)       insulin aspart 100 UNIT/ML injection    NovoLOG FLEXPEN    24 mL    1 unit per 4 Gms CHO at meals and snacks + correction scale of 1 unit per 25 mg/dL over 125. Average daily dose is 75 units.    Type II diabetes mellitus (H)       insulin degludec 200 UNIT/ML pen    TRESIBA    36 mL    Take 120 units daily.    Type 2 diabetes mellitus with hyperglycemia, with long-term current use  of insulin (H)       insulin pen needle 32G X 4 MM    BD VIKTORIA U/F    100 each    Use as directed.    Type II diabetes mellitus (H)       lactulose 10 GM/15ML solution    CHRONULAC    7200 mL    Take 45 mLs (30 g) by mouth 4 times daily    Liver cirrhosis secondary to ESTRADA (H)       OLANZapine 2.5 MG tablet    zyPREXA    30 tablet    Take 1 tablet (2.5 mg) by mouth At Bedtime    Insomnia, unspecified type       omeprazole 40 MG capsule    priLOSEC    90 capsule    Take 1 capsule (40 mg) by mouth daily    Gastroesophageal reflux disease, esophagitis presence not specified       potassium chloride SA 20 MEQ CR tablet    K-DUR/KLOR-CON M    90 tablet    Take 1 tablet (20 mEq) by mouth daily    Hypokalemia       RA VITAMIN B-12 TR 1000 MCG Tbcr   Generic drug:  cyanocobalamin      Take 1,000 mcg by mouth daily        rifaximin 550 MG Tabs tablet    XIFAXAN    60 tablet    Take 1 tablet (550 mg) by mouth 2 times daily    Hepatic encephalopathy (H)       simvastatin 20 MG tablet    ZOCOR    90 tablet    Take 1 tablet (20 mg) by mouth At Bedtime    Hyperlipidemia LDL goal <100, Type 2 diabetes mellitus without complication, with long-term current use of insulin (H)       spironolactone 25 MG tablet    ALDACTONE    90 tablet    Take 1 tablet (25 mg) by mouth daily    Other ascites       THERAVITE PO      Take 1 tablet by mouth daily        VITAMIN D-3 PO      Take 2,000 Units by mouth

## 2017-10-18 ENCOUNTER — ALLIED HEALTH/NURSE VISIT (OUTPATIENT)
Dept: SURGERY | Facility: CLINIC | Age: 52
End: 2017-10-18

## 2017-10-18 ENCOUNTER — ANESTHESIA EVENT (OUTPATIENT)
Dept: SURGERY | Facility: CLINIC | Age: 52
End: 2017-10-18

## 2017-10-18 ENCOUNTER — OFFICE VISIT (OUTPATIENT)
Dept: SURGERY | Facility: CLINIC | Age: 52
End: 2017-10-18

## 2017-10-18 ENCOUNTER — OFFICE VISIT (OUTPATIENT)
Dept: OTOLARYNGOLOGY | Facility: CLINIC | Age: 52
End: 2017-10-18

## 2017-10-18 VITALS — HEIGHT: 69 IN | BODY MASS INDEX: 28.14 KG/M2 | WEIGHT: 190 LBS

## 2017-10-18 VITALS
HEIGHT: 69 IN | RESPIRATION RATE: 18 BRPM | DIASTOLIC BLOOD PRESSURE: 74 MMHG | TEMPERATURE: 97.6 F | WEIGHT: 190.7 LBS | BODY MASS INDEX: 28.24 KG/M2 | SYSTOLIC BLOOD PRESSURE: 136 MMHG | HEART RATE: 75 BPM | OXYGEN SATURATION: 98 %

## 2017-10-18 DIAGNOSIS — K74.69 OTHER CIRRHOSIS OF LIVER (H): ICD-10-CM

## 2017-10-18 DIAGNOSIS — K11.8 MASS OF PAROTID GLAND: ICD-10-CM

## 2017-10-18 DIAGNOSIS — K11.8 PAROTID MASS: Primary | ICD-10-CM

## 2017-10-18 DIAGNOSIS — Z01.818 PREOPERATIVE GENERAL PHYSICAL EXAMINATION: ICD-10-CM

## 2017-10-18 DIAGNOSIS — Z01.818 PREOPERATIVE GENERAL PHYSICAL EXAMINATION: Primary | ICD-10-CM

## 2017-10-18 LAB
INR PPP: 1.28 (ref 0.86–1.14)
PLATELET # BLD AUTO: 49 10E9/L (ref 150–450)

## 2017-10-18 ASSESSMENT — PAIN SCALES - GENERAL: PAINLEVEL: NO PAIN (0)

## 2017-10-18 ASSESSMENT — LIFESTYLE VARIABLES: TOBACCO_USE: 1

## 2017-10-18 NOTE — PATIENT INSTRUCTIONS
Preparing for Your Surgery      Name:  Frandy Workman   MRN:  [id number]   :  1965   Today's Date:  10/18/2017     Arriving for surgery:  Surgery date:  TBD -  1 to 3 days prior to surgery, a pre-admissions nurse will call you to confirm date, time, and location of surgery.  Arrival time:  TBD    Please come to:     TBD    What can I eat or drink?  -  You may have solid food or milk products until 8 hours prior to your surgery.  -  You may have clear liquids such as water, gatorade, tea, black coffee (no cream/milk), apple juice or 7up/Sprite until 2 hours prior to your surgery.    Which medicines can I take?        -  Do not bring your own medications to the hospital, except for inhalers and eye drops.        -  Please hold Aspirin and all vitamins/minerals/supplements for 7 days before surgery.    -  Please take these medications the day of surgery: Tresiba 88 units,  Carvedilol,  Omeprazole,  Spironolactone,  Potassium,  Rifaximin,  Pravastatin.    How do I prepare myself?  -  Take two showers: one the night before surgery; and one the morning of surgery.         Use Scrubcare or Hibiclens to wash from neck down.  You may use your own shampoo and conditioner. No other hair products.   -  Do NOT use lotion, powder, deodorant, or antiperspirant the day of your surgery.  -  Do NOT wear any makeup, fingernail polish or jewelry.    -  Bring your ID and insurance card.        -  Absolutely no chewing tobacco on the day of surgery.    Questions or Concerns:  If you have questions or concerns, please call the  Preoperative Assessment Center, Monday-Friday 7AM-7PM:  [phone].

## 2017-10-18 NOTE — H&P
Pre-Operative H & P     CC:  Preoperative exam to assess for increased cardiopulmonary risk while undergoing surgery and anesthesia.    Date of Encounter: 10/18/2017  Primary Care Physician:  Natalie Chun  Frandy Workman is a 52 year old male who presents for pre-operative H & P in preparation for undergo parotidectomy, possible neck dissection, with Dr. Morgan, date and time to be determined, in diagnosis and treatment of right sided parotid mass. Biopsy shows inflammatory cells.   He noticed the lump on right side of face a couple months ago after dental work. Biopsies have been inconclusive.   He feels good with no new complaints.    History is obtained from the patient.     Past Medical History  Past Medical History:   Diagnosis Date     Anemia 2013    Low blood plates current is 37     BPH (benign prostatic hyperplasia)      Cholelithiasis      Conductive hearing loss 8/16/2017    Have a lump on my right side of my face.  Had wax discharge     Depressive disorder 1986    Suffer effects throughout life     Gastroesophageal reflux disease 12/1/2014    Being treated with Prilosac     Hepatitis 2014    Diagnosed with schrosis ESTRADA in 2014.  Suffer from hepatatie     Hyperlipidemia      Liver cirrhosis secondary to ESTRADA (H)      Type II diabetes mellitus (H)        Past Surgical History  Past Surgical History:   Procedure Laterality Date     ESOPHAGOSCOPY, GASTROSCOPY, DUODENOSCOPY (EGD), COMBINED N/A 11/17/2016    Procedure: COMBINED ESOPHAGOSCOPY, GASTROSCOPY, DUODENOSCOPY (EGD);  Surgeon: Santi Rosas MD;  Location: [facility]     KNEE SURGERY Left        Hx of Blood transfusions/reactions: YES     Hx of abnormal bleeding or anti-platelet use: ON ASA    Menstrual history: No LMP for male patient.    Steroid use in the last year: NO    Personal or FH with difficulty with Anesthesia:  NO    Prior to Admission Medications  Current Outpatient Prescriptions   Medication Sig Dispense  Refill     dapagliflozin (FARXIGA) 10 MG TABS tablet Take 1 tablet (10 mg) by mouth daily (Patient taking differently: Take 10 mg by mouth every morning ) 90 tablet 3     pravastatin (PRAVACHOL) 10 MG tablet Take 2 tablets (20 mg) by mouth daily (Patient taking differently: Take 10 mg by mouth every morning ) 90 tablet 3     rifaximin (XIFAXAN) 550 MG TABS tablet Take 1 tablet (550 mg) by mouth 2 times daily 60 tablet 11     lactulose (CHRONULAC) 10 GM/15ML solution Take 45 mLs (30 g) by mouth 4 times daily (Patient taking differently: Take 60 g by mouth 4 times daily 2-6 TIMES A DAY) 7200 mL 11     spironolactone (ALDACTONE) 25 MG tablet Take 1 tablet (25 mg) by mouth daily (Patient taking differently: Take 25 mg by mouth every morning ) 90 tablet 1     carvedilol (COREG) 12.5 MG tablet Take 1 tablet (12.5 mg) by mouth 2 times daily (with meals) 180 tablet 3     aspirin (ASPIRIN LOW DOSE) 81 MG EC tablet Take 1 tablet (81 mg) by mouth daily (Patient taking differently: Take 81 mg by mouth every morning ) 90 tablet 3     omeprazole (PRILOSEC) 40 MG capsule Take 1 capsule (40 mg) by mouth daily (Patient taking differently: Take 40 mg by mouth every evening ) 90 capsule 2     OLANZapine (ZYPREXA) 2.5 MG tablet Take 1 tablet (2.5 mg) by mouth At Bedtime 30 tablet 5     potassium chloride SA (K-DUR/KLOR-CON M) 20 MEQ CR tablet Take 1 tablet (20 mEq) by mouth daily (Patient taking differently: Take 20 mEq by mouth every morning ) 90 tablet 3     Cholecalciferol (VITAMIN D-3 PO) Take 2,000 Units by mouth every morning        insulin aspart (NOVOLOG FLEXPEN) 100 UNIT/ML injection 1 unit per 4 Gms CHO at meals and snacks + correction scale of 1 unit per 25 mg/dL over 125. Average daily dose is 75 units. 24 mL 11     cyanocobalamin (RA VITAMIN B-12 TR) 1000 MCG TBCR Take 1,000 mcg by mouth every morning        Multiple Vitamin (THERAVITE PO) Take 1 tablet by mouth every morning        blood glucose monitoring (ACCU-CHEK  EDINSON PLUS) test strip Use to test blood sugar 4 times daily 400 each 3     blood glucose monitoring (ACCU-CHEK FASTCLIX) lancets Use to test blood sugar 4 times daily or as directed.  1 box = 102 lancets 408 each 3     insulin degludec (TRESIBA) 200 UNIT/ML pen Take 120 units daily. (Patient taking differently: Inject 110 Units Subcutaneous every morning Take 110 units dailY AS OF 10/19/17) 36 mL 3     insulin pen needle (BD VIKTORIA U/F) 32G X 4 MM Use as directed. 100 each 11       Allergies  Allergies   Allergen Reactions     Codeine Other (See Comments)     Cannot take due to liver         Social History  Social History     Social History     Marital status:      Spouse name: N/A     Number of children: N/A     Years of education: N/A     Occupational History     Not on file.     Social History Main Topics     Smoking status: Current Some Day Smoker     Packs/day: 6.00     Years: 30.00     Types: Dip, chew, snus or snuff     Start date: 2016     Last attempt to quit: 5/15/2013     Smokeless tobacco: Current User      Comment: 1 tin per week     Alcohol use No      Comment: quit 1996     Drug use: No     Sexual activity: Not Currently     Partners: Female     Birth control/ protection: Condom     Other Topics Concern     Not on file     Social History Narrative       Family History  Family History   Problem Relation Age of Onset     Prostate Cancer Maternal Grandfather      Substance Abuse Maternal Grandfather      Alcohol     Prostate Cancer Father      Colorectal Cancer Father      Macular Degeneration Father      Colorectal Cancer Maternal Grandmother      Substance Abuse Maternal Grandmother      Alcohol     Colorectal Cancer Paternal Grandmother      CANCER Mother      DIABETES Mother       3/2016     CEREBROVASCULAR DISEASE Mother      Passed away in Feb of this year, 80 years old.     Thyroid Disease Mother      Depression Mother      Asthma Sister      Had since birth     Thyroid  "Disease Sister      Depression Sister        Review of Systems    ROS/MED HX    ENT/Pulmonary:     (+)tobacco use, CIGAR USE / CURRENT CHEWING TOBACCO packs/day  , . .    Neurologic:     (+)neuropathy - BOTH FEET,     Cardiovascular:     (+) Dyslipidemia  Taking blood thinners Pt has not received instructions: Hold 1 week pre-op  Previous cardiac testing EKG today  ECHO and stress 2014 in [place]       METS/Exercise Tolerance:  >4 METS   Hematologic:     (+) Anemia, History of Transfusion no previous transfusion reaction Other Hematologic Disorder-thrombocytopenia      Musculoskeletal:   (+) arthritis, , , other musculoskeletal- shoulders and knees painfull      GI/Hepatic:     (+) hepatitis type Other, liver disease, Other GI/Hepatic Cirrhosis      Renal/Genitourinary:  - ROS Renal section negative       Endo:     (+) type II DM Last HgA1c: 7.6% date: 10/16/17 Using insulin - not using insulin pump Normal glucose range: 120-220 not previously admitted for DM/DKA Diabetic complications: neuropathy, .      Psychiatric:  - neg psychiatric ROS       Infectious Disease:  - neg infectious disease ROS       Malignancy:   (+) Malignancy History of Skin  Skin CA status post Surgery,         Other:    (+) No chance of pregnancy no H/O Chronic Pain,no other significant disability            The complete review of systems is negative other than noted in the HPI or here.   Temp: 97.6  F (36.4  C) Temp src: Oral BP: 136/74 Pulse: 75   Resp: 18 SpO2: 98 %         190 lbs 11.2 oz  5' 9\"   Body mass index is 28.16 kg/(m^2).       Physical Exam  Constitutional: Awake, alert, cooperative, no apparent distress, and appears stated age.  Eyes: Pupils equal, round and reactive to light, extra ocular muscles intact, sclera clear, conjunctiva normal.  HENT: Normocephalic, oral pharynx with moist mucus membranes, teeth in ill repair, tobacco stained with remnants of tobacco visible on gums. Loose and chipped teeth on the left lower " gingiva. No goiter appreciated.   Respiratory: Clear to auscultation bilaterally, no crackles or wheezing.  Cardiovascular: Regular rate and rhythm, normal S1 and S2, and no murmur noted.  Carotids +2, no bruits. Trace -1+ pretibial edema. Palpable pulses to radial  DP and PT arteries.   GI: Normal BS. Rounded contour with bulging flanks, soft, non-distended. Tender to deep palpation BUQ. No masses palpated.  Lymph/Hematologic: No cervical lymphadenopathy and no supraclavicular lymphadenopathy. Right side of face overlying parotid area swollen, no erythema or drainage.  Genitourinary:  deferred  Skin: Warm and dry.  No rashes at anticipated surgical site.   Musculoskeletal: Full ROM of neck. There is no redness, warmth, or swelling of the joints. Gross motor strength is normal.    Neurologic: Awake, alert, oriented to name, place and time. Cranial nerves II-XII are grossly intact. Gait is normal.   Neuropsychiatric: Calm, cooperative. Normal affect.     Labs: (personally reviewed)  CBC RESULTS:   Recent Labs   Lab Test  10/16/17   1225   WBC  3.0*   RBC  3.25*   HGB  11.4*   HCT  34.1*   MCV  105*   MCH  35.1*   MCHC  33.4   RDW  13.2   PLT  41*     Last Basic Metabolic Panel:  Lab Results   Component Value Date     10/16/2017      Lab Results   Component Value Date    POTASSIUM 4.3 10/16/2017     Lab Results   Component Value Date    CHLORIDE 107 10/16/2017     Lab Results   Component Value Date    DEBORAH 8.8 10/16/2017     Lab Results   Component Value Date    CO2 24 10/16/2017     Lab Results   Component Value Date    BUN 15 10/16/2017     Lab Results   Component Value Date    CR 0.94 10/16/2017     Lab Results   Component Value Date     10/16/2017       EKG: 10/18/17 NSR    ASSESSMENT and PLAN  Frandy Workman is a 52 year old male scheduled to undergo parotidectomy, possible neck dissection, with Dr. Morgan, date and time to be determined, in diagnosis and treatment of right sided parotid mass. Biopsy  shows inflammatory cells.     Pre-operative considerations include:  1.) Cardiac: Functional status independent. Exercise tolerance well over 4 METS. Cardiac risks: HLD (pravachol) and insulin dependent diabetes. No known CAD. Stress 2014 in [place]-negative per pt.   RCRI = 0.9%  EKG today NSR at 78 bpm.  No further cardiac evaluation indicated     2.) Pulmonary: Current tobacco chewing/past cigar smoking. No diagnosed pulmonary ds, no respiratory meds.   ARIELA risks = 2/8 = low risk    3.) GI: Liver cirrhosis due to ESTRADA (MELD 42 in past, when decompensated). Complications of hepatic encephalopathy, ascites, coagulopathy, portal hypertensive gastropathy, esophageal varices S/p banding (no bleeding). See hepatology note 10/16.  Currently compensated with rifaxamin, lactulose, spironolactone, carvedilol.  Quit ETOH 1996. Recent labs: platelets 41,000, HGB 11.4, wbc = 3, albumin 3. Bili 1.2. INR 1.32. MELD now at 10. Hx blood transfusion 2014 and platelet pheresis (last 3 weeks ago).  -HOLD ASA 7 days pre-op. Take all other GI meds    4.) Endo: type 2 diabetes with poor control (HGBA1C = 7.6%) Home -220). On Novolog and Tresiba long acting (24 hr) Insulin, as well as dapaglifflozin.   Hold oral hypoglycemic and short-acting Insulin DOS  Take 80% of Tresiba Insulin dose DOS. Monitor glucoses per protocol in the perioperative period.     MAC anesthesia in past- no problem, unsure if he ever had GA. Pt also evaluated by Dr. Guy. See her recommendations below.        . He has the following specific operative considerations:   - RCRI : 0.9%  risk of major adverse cardiac event.   - Anesthesia considerations:  Refer to PAC assessment in anesthesia records  - Risk of PONV score = 2.  If > 2, anti-emetic intervention recommended.    Pt appropriate for proposed procedure without further work-up.    Patient was discussed with Dr Guy.    LILLIAN Soto  Preoperative Assessment Center  [facility]  [facility]  [facility]  Phone: [phone]  Fax: [phone]

## 2017-10-18 NOTE — MR AVS SNAPSHOT
After Visit Summary   10/18/2017    Frandy Workman    MRN: [id number]           Patient Information     Date Of Birth          1965        Visit Information        Provider Department      10/18/2017 4:20 PM Asiya Morgan MD [facility] Ear [facility]ose [facility] Th[facility]at        Care Instructions    1. Patient is scheduled for surgery on November 2nd at 7:30am. You need to arrive by 5:30am.   2. Patient to schedule a pre-op physical with their primary MD within 30 days of the procedure.   3. Patient to avoid blood thinning medications 1 week prior to surgery (Ibuprofen, Aleve, Aspirin, etc.)   4. Patient to review contents within the surgical packet & use the antiseptic scrub as directed.   5. Patient to call the ENT clinic with further questions or concerns: [phone]    6. You are scheduled for your post op appointment with Dr. Morgan on 11/8 at 10:00am.           Follow-ups after your visit        Your next 10 appointments already scheduled     Nov 08, 2017 10:00 AM CST   (Arrive by 9:45 AM)   RETURN TUMOR VISIT with Asiya Morgan MD   [facility] Ear Nose [facility] Throat ([facility])    [address] [place] [id number] [phone]            Nov 17, 2017   Procedure with Santi Dotson MD   [facility] Endoscopy ([facility])    [address] [facility] [address] [facility] [id number] [phone]           The [facility] is located on the corner of [address] and [address] on the [facility]. It is easily accessible from virtually any point in the [place] [place] area, via [address] and [address].            Dec 11, 2017  2:00 PM CST   (Arrive by 1:45 PM)   Return Visit with Natalie Russell MD   [facility] ([facility])    [address] [place] [id number] [phone]            Dec 13, 2017 10:00 AM  CST   US ABDOMEN COMPLETE with UCUS1   [facility] US ([facility])    [address] [place] [id number] [phone]           Please bring a list of your medicines (including vitamins, minerals and over-the-counter drugs). Also, tell your doctor about any allergies you may have. Wear comfortable clothes and leave your valuables at home.  Adults: No eating or drinking for 8 hours before the exam. You may take medicine with a small sip of water.  Children: - Children 6+ years: No food or drink for 6 hours before exam. - Children 1-5 years: No food or drink for 4 hours before exam. - Infants, breast-fed: may have breast milk up to 2 hours before exam. - Infants, formula: may have bottle until 4 hours before exam.  Please call the Imaging Department at your exam site with any questions.            Feb 21, 2018 12:00 PM CST   (Arrive by 11:45 AM)   RETURN ENDOCRINE with Jennifer Wilcox MD   [facility] Endocrinology ([facility])    [address] [place] [id number] [phone]            Mar 08, 2018 12:30 PM CST   (Arrive by 12:15 PM)   Return Visit with Lamin Gonzalez DPM   [facility] Endocrinology ([facility])    [address] [place] [id number] [phone]            Apr 16, 2018 12:00 PM CDT   Lab with  LAB   [facility] Lab ([facility])    [address] [place] [id number] [phone]            Apr 16, 2018  1:00 PM CDT   (Arrive by 12:45 PM)   Return General Liver with Santi Dotson MD   [facility] Hepatology ([facility])    [address] [place] [id number] [phone]              Future tests that were ordered for you today     Open Future Orders        Priority Expected Expires Ordered    EKG 12-lead complete w/read -  "Clinics Routine 10/18/2017 11/17/2017 10/18/2017            Who to contact     Please call your clinic at [phone] to:    Ask questions about your health    Make or cancel appointments    Discuss your medicines    Learn about your test results    Speak to your doctor   If you have compliments or concerns about an experience at your clinic, or if you wish to file a complaint, please contact [facility] Patient Relations at [phone] or email us at [email or web address]         Additional Information About Your Visit        [employer] Information     [employer] gives you secure access to your electronic health record. If you see a primary care provider, you can also send messages to your care team and make appointments. If you have questions, please call your primary care clinic.  If you do not have a primary care provider, please call [phone] and they will assist you.      [employer] is an electronic gateway that provides easy, online access to your medical records. With [employer], you can request a clinic appointment, read your test results, renew a prescription or communicate with your care team.     To access your existing account, please contact your [facility] or call [phone] for assistance.        Care EveryWhere ID     This is your Care EveryWhere ID. This could be used by other organizations to access your [facility] medical records  [id number]        Your Vitals Were     Height BMI (Body Mass Index)                1.753 m (5' 9\") 28.06 kg/m2           Blood Pressure from Last 3 Encounters:   10/18/17 136/74   10/16/17 120/72   10/16/17 120/72    Weight from Last 3 Encounters:   10/18/17 86.2 kg (190 lb)   10/18/17 86.5 kg (190 lb 11.2 oz)   10/16/17 86.2 kg (190 lb)              Today, you had the following     No orders found for display         Today's Medication Changes          These changes are accurate as of: 10/18/17  4:49 PM.  " If you have any questions, ask your nurse or doctor.               These medicines have changed or have updated prescriptions.        Dose/Directions    aspirin 81 MG EC tablet   Commonly known as:  ASPIRIN LOW DOSE   This may have changed:  when to take this   Used for:  Type 2 diabetes mellitus without complication, with long-term current use of insulin (H)        Dose:  81 mg   Take 1 tablet (81 mg) by mouth daily   Quantity:  90 tablet   Refills:  3       dapagliflozin 10 MG Tabs tablet   Commonly known as:  FARXIGA   This may have changed:  when to take this   Used for:  Type 2 diabetes mellitus with hyperglycemia, with long-term current use of insulin (H)        Dose:  10 mg   Take 1 tablet (10 mg) by mouth daily   Quantity:  90 tablet   Refills:  3       insulin degludec 200 UNIT/ML pen   Commonly known as:  TRESIBA   This may have changed:    - how much to take  - how to take this  - when to take this  - additional instructions   Used for:  Type 2 diabetes mellitus with hyperglycemia, with long-term current use of insulin (H)        Take 120 units daily.   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 (H)        Dose:  30 g   Take 45 mLs (30 g) by mouth 4 times daily   Quantity:  7200 mL   Refills:  11       omeprazole 40 MG capsule   Commonly known as:  priLOSEC   This may have changed:  when to take this   Used for:  Gastroesophageal reflux disease, esophagitis presence not specified        Dose:  40 mg   Take 1 capsule (40 mg) by mouth daily   Quantity:  90 capsule   Refills:  2       potassium chloride SA 20 MEQ CR tablet   Commonly known as:  K-DUR/KLOR-CON M   This may have changed:  when to take this   Used for:  Hypokalemia        Dose:  20 mEq   Take 1 tablet (20 mEq) by mouth daily   Quantity:  90 tablet   Refills:  3        pravastatin 10 MG tablet   Commonly known as:  PRAVACHOL   This may have changed:    - how much to take  - when to take this   Used for:  Hyperlipidemia LDL goal <100        Dose:  20 mg   Take 2 tablets (20 mg) by mouth daily   Quantity:  90 tablet   Refills:  3       spironolactone 25 MG tablet   Commonly known as:  ALDACTONE   This may have changed:  when to take this   Used for:  Other ascites        Dose:  25 mg   Take 1 tablet (25 mg) by mouth daily   Quantity:  90 tablet   Refills:  1                Primary Care Provider Office Phone # Fax #    Natalie Mitzi Russell [phone]       [address] [facility] [address]  [facility] [address]        Equal Access to Services     MINDI RODRIGUEZ : Amelia Vanessa, darío story, jasmeet valle, emy vuong. So [facility] [phone].    ATENCIÓN: Si habla español, tiene a hanley disposición servicios gratuitos de asistencia lingüística. Llame al [phone].    We comply with applicable federal civil rights laws and [place] laws. We do not discriminate on the basis of race, color, national origin, age, disability, sex, sexual orientation, or gender identity.            Thank you!     Thank you for choosing [facility] EAR [facility]SE [facility] TH[facility]AT  for your care. Our goal is always to provide you with excellent care. Hearing back from our patients is one way we can continue to improve our services. Please take a few minutes to complete the written survey that you may receive in the mail after your visit with us. Thank you!             Your Updated Medication List - Protect others around you: Learn how to safely use, store and throw away your medicines at www.disposemymeds.org.          This list is accurate as of: 10/18/17  4:49 PM.  Always use your most recent med list.                   Brand Name Dispense Instructions for use Diagnosis    aspirin 81 MG EC tablet    ASPIRIN LOW DOSE    90 tablet    Take 1 tablet  (81 mg) by mouth daily    Type 2 diabetes mellitus without complication, with long-term current use of insulin (H)       blood glucose monitoring lancets     408 each    Use to test blood sugar 4 times daily or as directed.  1 box = 102 lancets    Type II diabetes mellitus (H)       blood glucose monitoring test strip    ACCU-CHEK EDINSON PLUS    400 each    Use to test blood sugar 4 times daily    Type II diabetes mellitus (H)       carvedilol 12.5 MG tablet    COREG    180 tablet    Take 1 tablet (12.5 mg) by mouth 2 times daily (with meals)    Liver cirrhosis secondary to ESTRADA (H), Secondary esophageal varices without bleeding (H)       dapagliflozin 10 MG Tabs tablet    FARXIGA    90 tablet    Take 1 tablet (10 mg) by mouth daily    Type 2 diabetes mellitus with hyperglycemia, with long-term current use of insulin (H)       insulin aspart 100 UNIT/ML injection    NovoLOG FLEXPEN    24 mL    1 unit per 4 Gms CHO at meals and snacks + correction scale of 1 unit per 25 mg/dL over 125. Average daily dose is 75 units.    Type II diabetes mellitus (H)       insulin degludec 200 UNIT/ML pen    TRESIBA    36 mL    Take 120 units daily.    Type 2 diabetes mellitus with hyperglycemia, with long-term current use of insulin (H)       insulin pen needle 32G X 4 MM    BD VIKTORIA U/F    100 each    Use as directed.    Type II diabetes mellitus (H)       lactulose 10 GM/15ML solution    CHRONULAC    7200 mL    Take 45 mLs (30 g) by mouth 4 times daily    Liver cirrhosis secondary to ESTRADA (H)       OLANZapine 2.5 MG tablet    zyPREXA    30 tablet    Take 1 tablet (2.5 mg) by mouth At Bedtime    Insomnia, unspecified type       omeprazole 40 MG capsule    priLOSEC    90 capsule    Take 1 capsule (40 mg) by mouth daily    Gastroesophageal reflux disease, esophagitis presence not specified       potassium chloride SA 20 MEQ CR tablet    K-DUR/KLOR-CON M    90 tablet    Take 1 tablet (20 mEq) by mouth daily    Hypokalemia        pravastatin 10 MG tablet    PRAVACHOL    90 tablet    Take 2 tablets (20 mg) by mouth daily    Hyperlipidemia LDL goal <100       RA VITAMIN B-12 TR 1000 MCG Tbcr   Generic drug:  cyanocobalamin      Take 1,000 mcg by mouth every morning        rifaximin 550 MG Tabs tablet    XIFAXAN    60 tablet    Take 1 tablet (550 mg) by mouth 2 times daily    Hepatic encephalopathy (H)       spironolactone 25 MG tablet    ALDACTONE    90 tablet    Take 1 tablet (25 mg) by mouth daily    Other ascites       THERAVITE PO      Take 1 tablet by mouth every morning        VITAMIN D-3 PO      Take 2,000 Units by mouth every morning

## 2017-10-18 NOTE — NURSING NOTE
Teaching Flowsheet - ENT   Relevant Diagnosis:  Parotid mass  Teaching Topic: parotidectomy possible neck dissection   Person(s) involved in teaching: patient        Motivation Level:  Asks Questions:   Yes  Eager to Learn:   Yes  Cooperative:   Yes  Receptive (willing/able to accept information):   Yes  Comments: Reviewed pre-op H and P,  NPO prior to  surgery,  pre-op scrub (given Hibiclens)  Reviewed post-op  cares , activity and pain.     Patient demonstrates understanding of the following:  Reason for the appointment, diagnosis and treatment plan:   Yes  Knowledge of proper use of medications and conditions for which they are ordered (with special attention to potential side effects or drug interactions):  stop aspirin products 1 week before surgery Yes  Which situations necessitate calling provider and whom to contact:   Yes  Nutritional needs and diet plan:   Yes  Pain management techniques:   Yes  Patient instructed on hand hygiene:  Yes  How and/when to access community resources:   Yes     Infection Prevention:  Patient   demonstrates understanding of the following:  Surgical procedure site care taught Yes  Signs and symptoms of infection taught Yes  Wound care taught Yes  Instructional Materials Used/Given: pre- op booklet,verbal  Instruction.    Gay Jamil, RN, BSN

## 2017-10-18 NOTE — ANESTHESIA PREPROCEDURE EVALUATION
Anesthesia Evaluation     . Pt has had prior anesthetic. Type: MAC           ROS/MED HX    ENT/Pulmonary:     (+)tobacco use, CIGAR USE / CURRENT CHEWING TOBACCO packs/day  , . .    Neurologic:     (+)neuropathy - BOTH FEET,     Cardiovascular:     (+) Dyslipidemia, ----. Taking blood thinners Pt has not received instructions: . . . :. . Previous cardiac testing Echodate:results:Stress Testdate: results:ECG reviewed date: results: date: results:          METS/Exercise Tolerance:  >4 METS   Hematologic:     (+) Anemia, History of Transfusion no previous transfusion reaction Other Hematologic Disorder-thrombocytopenia      Musculoskeletal:   (+) arthritis, , , other musculoskeletal- shoulders and knees painfull      GI/Hepatic:     (+) liver disease, Other GI/Hepatic Cirrhosis      Renal/Genitourinary:  - ROS Renal section negative       Endo:     (+) type II DM Last HgA1c: 7.6% date: 10/16/17 Using insulin - not using insulin pump Normal glucose range: 120-220 not previously admitted for DM/DKA Diabetic complications: neuropathy, .      Psychiatric:  - neg psychiatric ROS       Infectious Disease:  - neg infectious disease ROS       Malignancy:   (+) Malignancy History of Skin  Skin CA status post Surgery,         Other:    (+) No chance of pregnancy no H/O Chronic Pain,no other significant disability                              PAC Discussion and Assessment    ASA Classification: 3  Case is suitable for:   Anesthetic techniques and relevant risks discussed: GA  Invasive monitoring and risk discussed:   Types:   Possibility and Risk of blood transfusion discussed:   NPO instructions given:   Additional anesthetic preparation and risks discussed:   Needs early admission to pre-op area:   Other:     PAC Resident/NP Anesthesia Assessment:  52 year old male for parotidectomy, possible neck dissection, with Dr. Morgan, date and time to be determined, in diagnosis and treatment of right sided parotid mass. Biopsy shows  inflammatory cells.   PAC referral for risk assessment and optimization for anesthesia with comorbid conditions of liver cirrhosis.  Pre-operative considerations include:  1.) Cardiac: Functional status independent. Exercise tolerance well over 4 METS. Cardiac risks: HLD (pravachol) and insulin dependent diabetes. No known CAD. Stress 2014 in [place]-negative per pt.   RCRI = 0.9%  EKG today   2.) Pulmonary: Current tobacco chewing/past cigar smoking. No diagnosed pulmonary ds, no respiratory meds. ARIELA risks = 2/8 = low risk  3.) GI: Liver cirrhosis due to ESTRADA (MELD 42 in past, when decompensated). Complications of hepatic encephalopathy, ascites, coagulopathy, portal hypertensive gastropathy, esophageal varices S/p banding (no bleeding). See hepatology note 10/16.  Currently compensated with rifaxamin, lactulose, spironolactone, carvedilol.  Quit ETOH 1996. Recent labs: platelets 41,000, HGB 11.4, wbc = 3, albumin 3. Bili 1.2. INR 1.32. MELD now at 10. Hx blood transfusion 2014 and platelet pheresis (last 3 weeks ago).  -HOLD ASA 7 days pre-op. Take all other GI meds  4.) Endo: type 2 diabetes with poor control (HGBA1C = 7.6%) Home -220). On Novolog and Tresiba long acting (24 hr) Insulin, as well as dapaglifflozin.   Hold oral hypoglycemic and short-acting Insulin DOS  Take 80% of Tresiba Insulin dose DOS. Monitor glucoses per protocol in the perioperative period.   MAC anesthesia in past- no problem, unsure if he ever had GA. Pt also evaluated by Dr. Guy. See her recommendations below.            Reviewed and Signed by PAC Mid-Level Provider/Resident  Mid-Level Provider/Resident: Enriqueta Valdez PA-C  Date: 10/18/17  Time: 2:40 PM    Attending Anesthesiologist Anesthesia Assessment:  52 year old for parotidectomy, possible neck dissection in management of right parotid mass. Chart reviewed, patient seen and evaluated; agree with above assessment. No significant cardiac disease, stress test in  2014 negative. Patient had ESTRADA decompensation with MELD 42 in 2014, but now well compensated with MELD 10, INR from 1-1.3, platelets 41,000 - has received platelets in the past. Tobacco chewer. IDDM with A1c 7.6.     Discussed coagulation status with Dr. Morgan, would suggest a TEG DOS could guide whether or not he would need platelets. Alternatively, could make incision and simply evaluate bleeding , and can give platelets at any time if bleeding thought to be excessive.     Patient is appropriate for the planned procedure without further workup or medical management change. The final anesthesia plan will be determined by the physician anesthesiologist caring for the patient on the day of surgery.      Reviewed and Signed by PAC Anesthesiologist  Anesthesiologist: jeimy  Date: 10/18/2017  Time:   Pass/Fail: Pass  Disposition:     PAC Pharmacist Assessment:        Pharmacist:   Date:   Time:                           .

## 2017-10-18 NOTE — NURSING NOTE
Chief Complaint   Patient presents with     RECHECK     parotid mass     Sahra Kumar Medical Assistant

## 2017-10-18 NOTE — MR AVS SNAPSHOT
After Visit Summary   10/18/2017    Frandy Workman    MRN: [id number]           Patient Information     Date Of Birth          1965        Visit Information        Provider Department      10/18/2017 2:30 PM Rn, [facility]        Care Instructions    Preparing for Your Surgery      Name:  Frandy Workman   MRN:  [id number]   :  1965   Today's Date:  10/18/2017     Arriving for surgery:  Surgery date:  TBD -  1 to 3 days prior to surgery, a pre-admissions nurse will call you to confirm date, time, and location of surgery.  Arrival time:  TBD    Please come to:     TBD    What can I eat or drink?  -  You may have solid food or milk products until 8 hours prior to your surgery.  -  You may have clear liquids such as water, gatorade, tea, black coffee (no cream/milk), apple juice or 7up/Sprite until 2 hours prior to your surgery.    Which medicines can I take?        -  Do not bring your own medications to the hospital, except for inhalers and eye drops.        -  Please hold Aspirin and all vitamins/minerals/supplements for 7 days before surgery.    -  Please take these medications the day of surgery: Tresiba 88 units,  Carvedilol,  Omeprazole,  Spironolactone,  Potassium,  Rifaximin,  Pravastatin.    How do I prepare myself?  -  Take two showers: one the night before surgery; and one the morning of surgery.         Use Scrubcare or Hibiclens to wash from neck down.  You may use your own shampoo and conditioner. No other hair products.   -  Do NOT use lotion, powder, deodorant, or antiperspirant the day of your surgery.  -  Do NOT wear any makeup, fingernail polish or jewelry.    -  Bring your ID and insurance card.        -  Absolutely no chewing tobacco on the day of surgery.    Questions or Concerns:  If you have questions or concerns, please call the  Preoperative Assessment Center, Monday-Friday 7AM-7PM:  [phone].            Follow-ups after your  visit        Your next 10 appointments already scheduled     Oct 18, 2017  2:30 PM CDT   (Arrive by 2:15 PM)   PAC RN ASSESSMENT with  Pac Rn   [facility] ([facility])    [address] [place] [id number]   [phone]            Oct 18, 2017  3:30 PM CDT   (Arrive by 3:15 PM)   PAC Anesthesia Consult with  Pac Anesthesiologist   [facility] ([facility])    [address] [place] [id number]   [phone]            Oct 18, 2017  4:00 PM CDT   LAB with  LAB   [facility] Lab ([facility])    [address] [place] [id number] [phone]           Patient must bring picture ID. Patient should be prepared to give a urine specimen  Please do not eat 10-12 hours before your appointment if you are coming in fasting for labs on lipids, cholesterol, or glucose (sugar). Pregnant women should follow their Care Team instructions. Water with medications is okay. Do not drink coffee or other fluids. If you have concerns about taking  your medications, please ask at office or if scheduling via [employer], send a message by clicking on Secure Messaging, Message Your Care Team.            Oct 18, 2017  4:20 PM CDT   (Arrive by 4:05 PM)   Return Visit with Asiya Morgan MD   [facility] Nose [facility] Throat ([facility])    [address] [place] [id number]   [phone]            Nov 17, 2017   Procedure with Santi Dotson MD   [facility] [place] Endoscopy ([facility])    [address] [facility] [address]   [phone]           The [facility] is located on the corner of [address] [facility] [address] and [address] on the [facility]. It is easily accessible from virtually any point in  the [facility] area, via [address] and [address].            Dec 11, 2017  2:00 PM CST   (Arrive by 1:45 PM)   Return Visit with Natalie Russell MD   [facility] ([facility])    [address] [place] [address]  [address] [facility] [id number] [phone]            Dec 13, 2017 10:00 AM CST   US ABDOMEN COMPLETE with UCUS1   [facility] US ([facility])    [address] [place] [id number] [phone]           Please bring a list of your medicines (including vitamins, minerals and over-the-counter drugs). Also, tell your doctor about any allergies you may have. Wear comfortable clothes and leave your valuables at home.  Adults: No eating or drinking for 8 hours before the exam. You may take medicine with a small sip of water.  Children: - Children 6+ years: No food or drink for 6 hours before exam. - Children 1-5 years: No food or drink for 4 hours before exam. - Infants, breast-fed: may have breast milk up to 2 hours before exam. - Infants, formula: may have bottle until 4 hours before exam.  Please call the Imaging Department at your exam site with any questions.            Feb 21, 2018 12:00 PM CST   (Arrive by 11:45 AM)   RETURN ENDOCRINE with Jennifer Wilcox MD   [facility] Endocrinology ([facility])    [address] [place] [id number] [phone]            Mar 08, 2018 12:30 PM CST   (Arrive by 12:15 PM)   Return Visit with Lamin Gonzalez DPM   [facility] Endocrinology ([facility])    [address] [place] [id number] [phone]            Apr 16, 2018 12:00 PM CDT   Lab with  LAB   [facility] Lab ([facility])    [address] [place] [id number] [phone]              Who to contact     Please call your clinic at [phone] to:    Ask  questions about your health    Make or cancel appointments    Discuss your medicines    Learn about your test results    Speak to your doctor   If you have compliments or concerns about an experience at your clinic, or if you wish to file a complaint, please contact [facility] Patient Relations at [phone] or email us at [email or web address]         Additional Information About Your Visit        MyChart Information     [employer] gives you secure access to your electronic health record. If you see a primary care provider, you can also send messages to your care team and make appointments. If you have questions, please call your primary care clinic.  If you do not have a primary care provider, please call [phone] and they will assist you.      [employer] is an electronic gateway that provides easy, online access to your medical records. With [employer], you can request a clinic appointment, read your test results, renew a prescription or communicate with your care team.     To access your existing account, please contact your [facility] or call [phone] for assistance.        Care EveryWhere ID     This is your Care EveryWhere ID. This could be used by other organizations to access your [facility] medical records  [id number]         Blood Pressure from Last 3 Encounters:   10/18/17 136/74   10/16/17 120/72   10/16/17 120/72    Weight from Last 3 Encounters:   10/18/17 86.5 kg (190 lb 11.2 oz)   10/16/17 86.2 kg (190 lb)   10/16/17 86.2 kg (190 lb)              Today, you had the following     No orders found for display         Today's Medication Changes          These changes are accurate as of: 10/18/17  2:24 PM.  If you have any questions, ask your nurse or doctor.               These medicines have changed or have updated prescriptions.        Dose/Directions    aspirin 81 MG EC tablet   Commonly known as:  ASPIRIN LOW DOSE   This may have  changed:  when to take this   Used for:  Type 2 diabetes mellitus without complication, with long-term current use of insulin (H)        Dose:  81 mg   Take 1 tablet (81 mg) by mouth daily   Quantity:  90 tablet   Refills:  3       dapagliflozin 10 MG Tabs tablet   Commonly known as:  FARXIGA   This may have changed:  when to take this   Used for:  Type 2 diabetes mellitus with hyperglycemia, with long-term current use of insulin (H)        Dose:  10 mg   Take 1 tablet (10 mg) by mouth daily   Quantity:  90 tablet   Refills:  3       insulin degludec 200 UNIT/ML pen   Commonly known as:  TRESIBA   This may have changed:    - how much to take  - how to take this  - when to take this  - additional instructions   Used for:  Type 2 diabetes mellitus with hyperglycemia, with long-term current use of insulin (H)        Take 120 units daily.   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 (H)        Dose:  30 g   Take 45 mLs (30 g) by mouth 4 times daily   Quantity:  7200 mL   Refills:  11       omeprazole 40 MG capsule   Commonly known as:  priLOSEC   This may have changed:  when to take this   Used for:  Gastroesophageal reflux disease, esophagitis presence not specified        Dose:  40 mg   Take 1 capsule (40 mg) by mouth daily   Quantity:  90 capsule   Refills:  2       potassium chloride SA 20 MEQ CR tablet   Commonly known as:  K-DUR/KLOR-CON M   This may have changed:  when to take this   Used for:  Hypokalemia        Dose:  20 mEq   Take 1 tablet (20 mEq) by mouth daily   Quantity:  90 tablet   Refills:  3       pravastatin 10 MG tablet   Commonly known as:  PRAVACHOL   This may have changed:    - how much to take  - when to take this   Used for:  Hyperlipidemia LDL goal <100        Dose:  20 mg   Take 2 tablets (20 mg) by mouth daily    Quantity:  90 tablet   Refills:  3       spironolactone 25 MG tablet   Commonly known as:  ALDACTONE   This may have changed:  when to take this   Used for:  Other ascites        Dose:  25 mg   Take 1 tablet (25 mg) by mouth daily   Quantity:  90 tablet   Refills:  1                Primary Care Provider Office Phone # Fax #    Natalie Cartagena Andrés Russell [phone]       [address] [facility] [address]  [facility] [address]        Equal Access to Services     MINDI RODRIGUEZ AH: Hadii aad ku hadasho Soomaali, waaxda luqadaha, qaybta kaalmada adeegyada, waxay idiin hayaan adeeg kharash la'brigitte . So [facility] [phone].    ATENCIÓN: Si habla español, tiene a hanley disposición servicios gratuitos de asistencia lingüística. [facility] [phone].    We comply with applicable federal civil rights laws and [place] laws. We do not discriminate on the basis of race, color, national origin, age, disability, sex, sexual orientation, or gender identity.            Thank you!     Thank you for choosing [facility]  for your care. Our goal is always to provide you with excellent care. Hearing back from our patients is one way we can continue to improve our services. Please take a few minutes to complete the written survey that you may receive in the mail after your visit with us. Thank you!             Your Updated Medication List - Protect others around you: Learn how to safely use, store and throw away your medicines at www.disposemymeds.org.          This list is accurate as of: 10/18/17  2:24 PM.  Always use your most recent med list.                   Brand Name Dispense Instructions for use Diagnosis    aspirin 81 MG EC tablet    ASPIRIN LOW DOSE    90 tablet    Take 1 tablet (81 mg) by mouth daily    Type 2 diabetes mellitus without complication, with long-term current use of insulin (H)       blood glucose monitoring lancets     408 each    Use to test blood sugar 4 times daily or as  directed.  1 box = 102 lancets    Type II diabetes mellitus (H)       blood glucose monitoring test strip    ACCU-CHEK EDINSON PLUS    400 each    Use to test blood sugar 4 times daily    Type II diabetes mellitus (H)       carvedilol 12.5 MG tablet    COREG    180 tablet    Take 1 tablet (12.5 mg) by mouth 2 times daily (with meals)    Liver cirrhosis secondary to ESTRADA (H), Secondary esophageal varices without bleeding (H)       dapagliflozin 10 MG Tabs tablet    FARXIGA    90 tablet    Take 1 tablet (10 mg) by mouth daily    Type 2 diabetes mellitus with hyperglycemia, with long-term current use of insulin (H)       insulin aspart 100 UNIT/ML injection    NovoLOG FLEXPEN    24 mL    1 unit per 4 Gms CHO at meals and snacks + correction scale of 1 unit per 25 mg/dL over 125. Average daily dose is 75 units.    Type II diabetes mellitus (H)       insulin degludec 200 UNIT/ML pen    TRESIBA    36 mL    Take 120 units daily.    Type 2 diabetes mellitus with hyperglycemia, with long-term current use of insulin (H)       insulin pen needle 32G X 4 MM    BD VIKTORIA U/F    100 each    Use as directed.    Type II diabetes mellitus (H)       lactulose 10 GM/15ML solution    CHRONULAC    7200 mL    Take 45 mLs (30 g) by mouth 4 times daily    Liver cirrhosis secondary to ESTRADA (H)       OLANZapine 2.5 MG tablet    zyPREXA    30 tablet    Take 1 tablet (2.5 mg) by mouth At Bedtime    Insomnia, unspecified type       omeprazole 40 MG capsule    priLOSEC    90 capsule    Take 1 capsule (40 mg) by mouth daily    Gastroesophageal reflux disease, esophagitis presence not specified       potassium chloride SA 20 MEQ CR tablet    K-DUR/KLOR-CON M    90 tablet    Take 1 tablet (20 mEq) by mouth daily    Hypokalemia       pravastatin 10 MG tablet    PRAVACHOL    90 tablet    Take 2 tablets (20 mg) by mouth daily    Hyperlipidemia LDL goal <100       RA VITAMIN B-12 TR 1000 MCG Tbcr   Generic drug:  cyanocobalamin      Take 1,000 mcg by mouth  every morning        rifaximin 550 MG Tabs tablet    XIFAXAN    60 tablet    Take 1 tablet (550 mg) by mouth 2 times daily    Hepatic encephalopathy (H)       spironolactone 25 MG tablet    ALDACTONE    90 tablet    Take 1 tablet (25 mg) by mouth daily    Other ascites       THERAVITE PO      Take 1 tablet by mouth every morning        VITAMIN D-3 PO      Take 2,000 Units by mouth every morning

## 2017-10-18 NOTE — PATIENT INSTRUCTIONS
1. Patient is scheduled for surgery on November 2nd at 7:30am. You need to arrive by 5:30am.   2. Patient to schedule a pre-op physical with their primary MD within 30 days of the procedure.   3. Patient to avoid blood thinning medications 1 week prior to surgery (Ibuprofen, Aleve, Aspirin, etc.)   4. Patient to review contents within the surgical packet & use the antiseptic scrub as directed.   5. Patient to call the ENT clinic with further questions or concerns: [phone]    6. You are scheduled for your post op appointment with Dr. Morgan on 11/8 at 10:00am.

## 2017-10-18 NOTE — LETTER
10/18/2017       RE: Frandy Workman  [address]   [facility] [address]     Dear Colleague,    Thank you for referring your patient, Frandy Workman, to the [facility] EAR NOSE AND THROAT at [facility]. Please see a copy of my visit note below.    Dear Dr. Rueda:    I had the pleasure of seeing Frandy Workman in follow-up today at the [facility] Ot[facility].     History of Present Illness:   Frandy Workman is a 52-year-old gentleman who was initially referred for evaluation of a right parotid lesion. The mass had been present for approximately 2 months and the patient had initially attributed it to dental issues. He was evaluated by Dr. Rueda who obtained a CT scan which showed a mass of the right parotid region. The patient was having significant pain associated with the mass. He underwent an FNA in clinic that showed inflammatory cells and no malignancy. He was placed on a course of antibiotics and sent for image guided FNA of the mass. The mass again was nondiagnostic with just inflammatory cells but the mass appeared to have increased in size on the U/S performed for the FNA. His case was discussed at tumor board and the recommendation was for surgical excision given his history of skin cancer in the right temporal region as well as his anticipation of potentially being placed on the transplant list for his liver. He comes in today to discuss these recommendations. He thinks the mass has not significantly changed in size since completion of his antibiotic course. He was seen in the anesthesia clinic for preoperative clearance. Of note he does have a history of liver failure with thrombocytopenia with his platelets typically around 30. He has had had a transfusion prior to dental work in the past. He says that he has been seen and liver clinic since his last visit and they are holding off on placing on transplant list at this  time.      MEDICATIONS:     Current Outpatient Prescriptions   Medication Sig Dispense Refill     dapagliflozin (FARXIGA) 10 MG TABS tablet Take 1 tablet (10 mg) by mouth daily (Patient taking differently: Take 10 mg by mouth every morning ) 90 tablet 3     pravastatin (PRAVACHOL) 10 MG tablet Take 2 tablets (20 mg) by mouth daily (Patient taking differently: Take 10 mg by mouth every morning ) 90 tablet 3     blood glucose monitoring (ACCU-CHEK EDINSON PLUS) test strip Use to test blood sugar 4 times daily 400 each 3     blood glucose monitoring (ACCU-CHEK FASTCLIX) lancets Use to test blood sugar 4 times daily or as directed.  1 box = 102 lancets 408 each 3     rifaximin (XIFAXAN) 550 MG TABS tablet Take 1 tablet (550 mg) by mouth 2 times daily 60 tablet 11     lactulose (CHRONULAC) 10 GM/15ML solution Take 45 mLs (30 g) by mouth 4 times daily (Patient taking differently: Take 60 g by mouth 4 times daily 2-6 TIMES A DAY) 7200 mL 11     insulin degludec (TRESIBA) 200 UNIT/ML pen Take 120 units daily. (Patient taking differently: Inject 110 Units Subcutaneous every morning Take 110 units dailY AS OF 10/19/17) 36 mL 3     spironolactone (ALDACTONE) 25 MG tablet Take 1 tablet (25 mg) by mouth daily (Patient taking differently: Take 25 mg by mouth every morning ) 90 tablet 1     insulin pen needle (BD VIKTORIA U/F) 32G X 4 MM Use as directed. 100 each 11     carvedilol (COREG) 12.5 MG tablet Take 1 tablet (12.5 mg) by mouth 2 times daily (with meals) 180 tablet 3     aspirin (ASPIRIN LOW DOSE) 81 MG EC tablet Take 1 tablet (81 mg) by mouth daily (Patient taking differently: Take 81 mg by mouth every morning ) 90 tablet 3     omeprazole (PRILOSEC) 40 MG capsule Take 1 capsule (40 mg) by mouth daily (Patient taking differently: Take 40 mg by mouth every evening ) 90 capsule 2     OLANZapine (ZYPREXA) 2.5 MG tablet Take 1 tablet (2.5 mg) by mouth At Bedtime 30 tablet 5     potassium chloride SA (K-DUR/KLOR-CON M) 20 MEQ CR  tablet Take 1 tablet (20 mEq) by mouth daily (Patient taking differently: Take 20 mEq by mouth every morning ) 90 tablet 3     Cholecalciferol (VITAMIN D-3 PO) Take 2,000 Units by mouth every morning        insulin aspart (NOVOLOG FLEXPEN) 100 UNIT/ML injection 1 unit per 4 Gms CHO at meals and snacks + correction scale of 1 unit per 25 mg/dL over 125. Average daily dose is 75 units. 24 mL 11     cyanocobalamin (RA VITAMIN B-12 TR) 1000 MCG TBCR Take 1,000 mcg by mouth every morning        Multiple Vitamin (THERAVITE PO) Take 1 tablet by mouth every morning          ALLERGIES:    Allergies   Allergen Reactions     Codeine Other (See Comments)     Cannot take due to liver         HABITS/SOCIAL HISTORY:   Chewing tobacco use  Quit alcohol in   Wife   Daughter lives in [place]    Social History     Social History     Marital status:      Spouse name: N/A     Number of children: N/A     Years of education: N/A     Occupational History     Not on file.     Social History Main Topics     Smoking status: Former Smoker     Packs/day: 6.00     Years: 30.00     Types: Cigars     Start date: 2016     Quit date: 5/15/2013     Smokeless tobacco: Current User     Types: Chew      Comment: 1 tin per week     Alcohol use No      Comment: quit 1996     Drug use: No     Sexual activity: Not Currently     Partners: Female     Birth control/ protection: Condom     Other Topics Concern     Not on file     Social History Narrative       PAST MEDICAL HISTORY:   Past Medical History:   Diagnosis Date     Anemia     Low blood plates current is 37     BPH (benign prostatic hyperplasia)      Cholelithiasis      Conductive hearing loss 2017    Have a lump on my right side of my face.  Had wax discharge     Depressive disorder     Suffer effects throughout life     Gastroesophageal reflux disease 2014    Being treated with Prilosac     Hepatitis 2014    Diagnosed with joan ESTRADA in .   "Suffer from hepatatie     Hyperlipidemia      Liver cirrhosis secondary to ESTRADA (H)      Type II diabetes mellitus (H)         PAST SURGICAL HISTORY:   Past Surgical History:   Procedure Laterality Date     ESOPHAGOSCOPY, GASTROSCOPY, DUODENOSCOPY (EGD), COMBINED N/A 2016    Procedure: COMBINED ESOPHAGOSCOPY, GASTROSCOPY, DUODENOSCOPY (EGD);  Surgeon: Santi Rosas MD;  Location:  GI     KNEE SURGERY Left        FAMILY HISTORY:    Family History   Problem Relation Age of Onset     Prostate Cancer Maternal Grandfather      Substance Abuse Maternal Grandfather      Alcohol     Colon Cancer Father 60     Pancreatic Cancer Father 60     Prostate Cancer Father      Colorectal Cancer Father      Macular Degeneration Father      CANCER Father      Colorectal Cancer Maternal Grandmother      CANCER Maternal Grandmother      Substance Abuse Maternal Grandmother      Alcohol     Colorectal Cancer Paternal Grandmother      CANCER Mother      DIABETES Mother       3/2016     CEREBROVASCULAR DISEASE Mother      Passed away in Feb of this year, 80 years old.     Thyroid Disease Mother      Depression Mother      Asthma Sister      Had since birth     Thyroid Disease Sister      Depression Sister      Liver Disease No family hx of        REVIEW OF SYSTEMS:  12 point ROS was negative other than the symptoms noted above in the HPI.  Patient Supplied Answers to Review of Systems  [facility] ENT ROS 10/15/2017   Constitutional Appetite change, Problems with sleep   Neurology Numbness   Eyes Visual loss   Ears, Nose, Throat Ringing/noise in ears, Nasal congestion or drainage, Hoarseness   Cardiopulmonary Cough   Gastrointestinal/Genitourinary Diarrhea   Musculoskeletal Sore or stiff joints   Allergy/Immunology -   Hematologic -   Endocrine Thirst   Skin Change in moles         PHYSICAL EXAMINATION:   Ht 1.753 m (5' 9\")  Wt 86.2 kg (190 lb)  BMI 28.06 kg/m2  Appearance:   normal; NAD, age-appropriate appearance, " well-developed, normal habitus   Communication:   normal; communicates verbally, normal voice quality   Head/Face:   inspection -  3.5 cm visible mass in the right temporal region overlying the zygoma   Salivary glands -  there is a firm mass that is present in the right temporal/parotid region that overlies the zygoma with measurements of about 3.5 cm in diameter without any overlying skin changes that is mildly tender to palpation   Facial strength -  Normal and symmetric bilateral; H/B I/VI   Skin:  normal, no rash   Ocular Motility:  normal occular movements   Ears:  auricle (AD) -  normal  auricle (AS) -  there is a crusted lesion on the helix  Normal clinical speech reception   Nose:  Ext. inspection -  Normal   Oral Cavity:  lips -  Normal mucosa, oral competence, and stoma size   No trismus   Teeth are in poor repair with multiple caries and broken teeth   Neck: No visible mass or asymmetry    Cardiovascular:  warm, pink, well-perfused extremities without swelling, tenderness, or edema   Respiratory:  Normal respiratory effort, no stridor   Neuro/Psych.:  mood/affect -  normal  mental status -  normal        RESULTS REVIEWED:     CYTOLOGIC INTERPRETATION:     PAROTID, RIGHT, ULTRASOUND GUIDED-FINE NEEDLE ASPIRATION:   - Abundant acute inflammation, rare bland appearing epithelial groups   and fragments of granulation tissue (see comment)   Specimen Adequacy: Satisfactory for evaluation.     COMMENT:   This specimen is composed predominantly of acute inflammatory cells and   macrophages with rare bland-appearing epithelial groups and a few   fragments of granulation tissue.  There is no evidence of malignancy in   the sampled specimen, however if the patient's mass persists following   resolution of the inflammation or there is suspicion for neoplasm,   repeat biopsy or excision of the mass may be warranted.     IMPRESSION AND PLAN:   Frandy Workman is a 52-year-old gentleman with a right-sided parotid mass.  I discussed with him that despite the negative FNA I am concerned that this is a malignancy given its persistence and the appearance on the scan. We did review his imaging at tumor board and the consensus was for surgical excision. The procedure would be a parotidectomy with possible neck dissection. I explained the procedure to the patient and the plan for the neck dissection if intraoperative frozen sections are consistent with malignancy. The patient is in agreement with the plan despite the negative FNA. He does say that he is not currently going to be placed on the transplant list but we discussed that any potential malignancies need to be removed prior to transplant immunosuppression and this lesion is very concerning.    We discussed the risks of the procedure and most of the time was spent discussing the risks to the facial nerve. He understands that if we have to perform a total parotidectomy there will be increased likelihood of weakness of the facial nerve following surgery. We discussed the risks from the neck dissection perspective as well. He understands that he will need to be in the hospital for drain management if we perform a total parotidectomy with neck dissection. He understands that pathology may be benign.    He is very resistant to the idea of using narcotic pain medications. I spoke with our anesthesia colleagues in clinic today who recommended consideration of use of a long acting local anesthetic. I explained to the patient that I am hesitant to use this in the setting that it can cause a theoretical risk of paralysis to the facial nerve. I will explore that further prior to his procedure and try to determine if we can proceed with this as an option. He did also say he is very resistant to the idea use of steroids in the perioperative period but he understands that we may give him one dose in the OR for swelling and nausea. He is in agreement with this plan. We will schedule him for  surgery when he returns from his upcoming trip to [place].    Thank you very much for the opportunity to participate in the care of your patient.      Asiya Morgan M.D.  Otolaryngology- Head & Neck Surgery        I spent a total of 25 minutes face-to-face with Frandy Workman during today s office visit. Over 50% of this time was spent counseling the patient and/or coordinating care regarding the management of the mass .      CC:  Natalie Russell MD  [address] [place] [id number]  [facility] [id number]    Sheba Rueda MD  Otolaryngology/Head & Neck Surgery  [facility] [phone]

## 2017-10-19 LAB
ABO + RH BLD: NORMAL
ABO + RH BLD: NORMAL
BLD GP AB SCN SERPL QL: NORMAL
BLOOD BANK CMNT PATIENT-IMP: NORMAL
BLOOD BANK CMNT PATIENT-IMP: NORMAL
SPECIMEN EXP DATE BLD: NORMAL

## 2017-10-20 NOTE — PROGRESS NOTES
Dear Dr. Rueda:    I had the pleasure of seeing Frandy Workman in follow-up today at the [facility].     History of Present Illness:   Frandy Workman is a 52-year-old gentleman who was initially referred for evaluation of a right parotid lesion. The mass had been present for approximately 2 months and the patient had initially attributed it to dental issues. He was evaluated by Dr. Rueda who obtained a CT scan which showed a mass of the right parotid region. The patient was having significant pain associated with the mass. He underwent an FNA in clinic that showed inflammatory cells and no malignancy. He was placed on a course of antibiotics and sent for image guided FNA of the mass. The mass again was nondiagnostic with just inflammatory cells but the mass appeared to have increased in size on the U/S performed for the FNA. His case was discussed at tumor board and the recommendation was for surgical excision given his history of skin cancer in the right temporal region as well as his anticipation of potentially being placed on the transplant list for his liver. He comes in today to discuss these recommendations. He thinks the mass has not significantly changed in size since completion of his antibiotic course. He was seen in the anesthesia clinic for preoperative clearance. Of note he does have a history of liver failure with thrombocytopenia with his platelets typically around 30. He has had had a transfusion prior to dental work in the past. He says that he has been seen and liver clinic since his last visit and they are holding off on placing on transplant list at this time.      MEDICATIONS:     Current Outpatient Prescriptions   Medication Sig Dispense Refill     dapagliflozin (FARXIGA) 10 MG TABS tablet Take 1 tablet (10 mg) by mouth daily (Patient taking differently: Take 10 mg by mouth every morning ) 90 tablet 3     pravastatin (PRAVACHOL) 10 MG tablet Take 2 tablets  (20 mg) by mouth daily (Patient taking differently: Take 10 mg by mouth every morning ) 90 tablet 3     blood glucose monitoring (ACCU-CHEK EDINSON PLUS) test strip Use to test blood sugar 4 times daily 400 each 3     blood glucose monitoring (ACCU-CHEK FASTCLIX) lancets Use to test blood sugar 4 times daily or as directed.  1 box = 102 lancets 408 each 3     rifaximin (XIFAXAN) 550 MG TABS tablet Take 1 tablet (550 mg) by mouth 2 times daily 60 tablet 11     lactulose (CHRONULAC) 10 GM/15ML solution Take 45 mLs (30 g) by mouth 4 times daily (Patient taking differently: Take 60 g by mouth 4 times daily 2-6 TIMES A DAY) 7200 mL 11     insulin degludec (TRESIBA) 200 UNIT/ML pen Take 120 units daily. (Patient taking differently: Inject 110 Units Subcutaneous every morning Take 110 units dailY AS OF 10/19/17) 36 mL 3     spironolactone (ALDACTONE) 25 MG tablet Take 1 tablet (25 mg) by mouth daily (Patient taking differently: Take 25 mg by mouth every morning ) 90 tablet 1     insulin pen needle (BD VIKTORIA U/F) 32G X 4 MM Use as directed. 100 each 11     carvedilol (COREG) 12.5 MG tablet Take 1 tablet (12.5 mg) by mouth 2 times daily (with meals) 180 tablet 3     aspirin (ASPIRIN LOW DOSE) 81 MG EC tablet Take 1 tablet (81 mg) by mouth daily (Patient taking differently: Take 81 mg by mouth every morning ) 90 tablet 3     omeprazole (PRILOSEC) 40 MG capsule Take 1 capsule (40 mg) by mouth daily (Patient taking differently: Take 40 mg by mouth every evening ) 90 capsule 2     OLANZapine (ZYPREXA) 2.5 MG tablet Take 1 tablet (2.5 mg) by mouth At Bedtime 30 tablet 5     potassium chloride SA (K-DUR/KLOR-CON M) 20 MEQ CR tablet Take 1 tablet (20 mEq) by mouth daily (Patient taking differently: Take 20 mEq by mouth every morning ) 90 tablet 3     Cholecalciferol (VITAMIN D-3 PO) Take 2,000 Units by mouth every morning        insulin aspart (NOVOLOG FLEXPEN) 100 UNIT/ML injection 1 unit per 4 Gms CHO at meals and snacks +  correction scale of 1 unit per 25 mg/dL over 125. Average daily dose is 75 units. 24 mL 11     cyanocobalamin (RA VITAMIN B-12 TR) 1000 MCG TBCR Take 1,000 mcg by mouth every morning        Multiple Vitamin (THERAVITE PO) Take 1 tablet by mouth every morning          ALLERGIES:    Allergies   Allergen Reactions     Codeine Other (See Comments)     Cannot take due to liver         HABITS/SOCIAL HISTORY:   Chewing tobacco use  Quit alcohol in   Wife   Daughter lives in [place]    Social History     Social History     Marital status:      Spouse name: N/A     Number of children: N/A     Years of education: N/A     Occupational History     Not on file.     Social History Main Topics     Smoking status: Former Smoker     Packs/day: 6.00     Years: 30.00     Types: Cigars     Start date: 2016     Quit date: 5/15/2013     Smokeless tobacco: Current User     Types: Chew      Comment: 1 tin per week     Alcohol use No      Comment: quit 1996     Drug use: No     Sexual activity: Not Currently     Partners: Female     Birth control/ protection: Condom     Other Topics Concern     Not on file     Social History Narrative       PAST MEDICAL HISTORY:   Past Medical History:   Diagnosis Date     Anemia     Low blood plates current is 37     BPH (benign prostatic hyperplasia)      Cholelithiasis      Conductive hearing loss 2017    Have a lump on my right side of my face.  Had wax discharge     Depressive disorder     Suffer effects throughout life     Gastroesophageal reflux disease 2014    Being treated with Prilosac     Hepatitis     Diagnosed with schrosis ESTRADA in .  Suffer from hepatatie     Hyperlipidemia      Liver cirrhosis secondary to ESTRADA (H)      Type II diabetes mellitus (H)         PAST SURGICAL HISTORY:   Past Surgical History:   Procedure Laterality Date     ESOPHAGOSCOPY, GASTROSCOPY, DUODENOSCOPY (EGD), COMBINED N/A 2016    Procedure: COMBINED  "ESOPHAGOSCOPY, GASTROSCOPY, DUODENOSCOPY (EGD);  Surgeon: Santi Rosas MD;  Location: [facility]     KNEE SURGERY Left        FAMILY HISTORY:    Family History   Problem Relation Age of Onset     Prostate Cancer Maternal Grandfather      Substance Abuse Maternal Grandfather      Alcohol     Colon Cancer Father 60     Pancreatic Cancer Father 60     Prostate Cancer Father      Colorectal Cancer Father      Macular Degeneration Father      CANCER Father      Colorectal Cancer Maternal Grandmother      CANCER Maternal Grandmother      Substance Abuse Maternal Grandmother      Alcohol     Colorectal Cancer Paternal Grandmother      CANCER Mother      DIABETES Mother       3/2016     CEREBROVASCULAR DISEASE Mother      Passed away in Feb of this year, 80 years old.     Thyroid Disease Mother      Depression Mother      Asthma Sister      Had since birth     Thyroid Disease Sister      Depression Sister      Liver Disease No family hx of        REVIEW OF SYSTEMS:  12 point ROS was negative other than the symptoms noted above in the HPI.  Patient Supplied Answers to Review of Systems  [facility] ROS 10/15/2017   Constitutional Appetite change, Problems with sleep   Neurology Numbness   Eyes Visual loss   Ears, Nose, Throat Ringing/noise in ears, Nasal congestion or drainage, Hoarseness   Cardiopulmonary Cough   Gastrointestinal/Genitourinary Diarrhea   Musculoskeletal Sore or stiff joints   Allergy/Immunology -   Hematologic -   Endocrine Thirst   Skin Change in moles         PHYSICAL EXAMINATION:   Ht 1.753 m (5' 9\")  Wt 86.2 kg (190 lb)  BMI 28.06 kg/m2  Appearance:   normal; NAD, age-appropriate appearance, well-developed, normal habitus   Communication:   normal; communicates verbally, normal voice quality   Head/Face:   inspection -  3.5 cm visible mass in the right temporal region overlying the zygoma   Salivary glands -  there is a firm mass that is present in the right temporal/parotid region that " overlies the zygoma with measurements of about 3.5 cm in diameter without any overlying skin changes that is mildly tender to palpation   Facial strength -  Normal and symmetric bilateral; H/B I/VI   Skin:  normal, no rash   Ocular Motility:  normal occular movements   Ears:  auricle (AD) -  normal  auricle (AS) -  there is a crusted lesion on the helix  Normal clinical speech reception   Nose:  Ext. inspection -  Normal   Oral Cavity:  lips -  Normal mucosa, oral competence, and stoma size   No trismus   Teeth are in poor repair with multiple caries and broken teeth   Neck: No visible mass or asymmetry    Cardiovascular:  warm, pink, well-perfused extremities without swelling, tenderness, or edema   Respiratory:  Normal respiratory effort, no stridor   Neuro/Psych.:  mood/affect -  normal  mental status -  normal        RESULTS REVIEWED:     CYTOLOGIC INTERPRETATION:     PAROTID, RIGHT, ULTRASOUND GUIDED-FINE NEEDLE ASPIRATION:   - Abundant acute inflammation, rare bland appearing epithelial groups   and fragments of granulation tissue (see comment)   Specimen Adequacy: Satisfactory for evaluation.     COMMENT:   This specimen is composed predominantly of acute inflammatory cells and   macrophages with rare bland-appearing epithelial groups and a few   fragments of granulation tissue.  There is no evidence of malignancy in   the sampled specimen, however if the patient's mass persists following   resolution of the inflammation or there is suspicion for neoplasm,   repeat biopsy or excision of the mass may be warranted.     IMPRESSION AND PLAN:   Frandy Workman is a 52-year-old gentleman with a right-sided parotid mass. I discussed with him that despite the negative FNA I am concerned that this is a malignancy given its persistence and the appearance on the scan. We did review his imaging at tumor board and the consensus was for surgical excision. The procedure would be a parotidectomy with possible neck dissection.  I explained the procedure to the patient and the plan for the neck dissection if intraoperative frozen sections are consistent with malignancy. The patient is in agreement with the plan despite the negative FNA. He does say that he is not currently going to be placed on the transplant list but we discussed that any potential malignancies need to be removed prior to transplant immunosuppression and this lesion is very concerning.    We discussed the risks of the procedure and most of the time was spent discussing the risks to the facial nerve. He understands that if we have to perform a total parotidectomy there will be increased likelihood of weakness of the facial nerve following surgery. We discussed the risks from the neck dissection perspective as well. He understands that he will need to be in the hospital for drain management if we perform a total parotidectomy with neck dissection. He understands that pathology may be benign.    He is very resistant to the idea of using narcotic pain medications. I spoke with our anesthesia colleagues in clinic today who recommended consideration of use of a long acting local anesthetic. I explained to the patient that I am hesitant to use this in the setting that it can cause a theoretical risk of paralysis to the facial nerve. I will explore that further prior to his procedure and try to determine if we can proceed with this as an option. He did also say he is very resistant to the idea use of steroids in the perioperative period but he understands that we may give him one dose in the OR for swelling and nausea. He is in agreement with this plan. We will schedule him for surgery when he returns from his upcoming trip to [place].    Thank you very much for the opportunity to participate in the care of your patient.      Asiya Morgan M.D.  Otolaryngology- Head & Neck Surgery        I spent a total of 25 minutes face-to-face with Frandy Workman during today s office visit.  Over 50% of this time was spent counseling the patient and/or coordinating care regarding the management of the mass .            CC:  Natalie Russell MD  [address]  [facility] [address]  Sheba Rueda MD  Otolaryngology/Head & Neck Surgery  [facility] [address]

## 2017-10-24 ENCOUNTER — MYC MEDICAL ADVICE (OUTPATIENT)
Dept: ENDOCRINOLOGY | Facility: CLINIC | Age: 52
End: 2017-10-24

## 2017-11-02 ENCOUNTER — ANESTHESIA (OUTPATIENT)
Dept: SURGERY | Facility: CLINIC | Age: 52
DRG: 134 | End: 2017-11-02
Payer: MEDICARE

## 2017-11-02 ENCOUNTER — HOSPITAL ENCOUNTER (INPATIENT)
Facility: CLINIC | Age: 52
LOS: 3 days | Discharge: HOME | DRG: 134 | End: 2017-11-06
Attending: OTOLARYNGOLOGY | Admitting: OTOLARYNGOLOGY
Payer: MEDICARE

## 2017-11-02 ENCOUNTER — ANESTHESIA EVENT (OUTPATIENT)
Dept: SURGERY | Facility: CLINIC | Age: 52
DRG: 134 | End: 2017-11-02
Payer: MEDICARE

## 2017-11-02 DIAGNOSIS — G89.18 ACUTE POST-OPERATIVE PAIN: ICD-10-CM

## 2017-11-02 DIAGNOSIS — K11.8 PAROTID MASS: Primary | ICD-10-CM

## 2017-11-02 DIAGNOSIS — K11.3 PAROTID ABSCESS: ICD-10-CM

## 2017-11-02 DIAGNOSIS — H04.123 DRY EYES: ICD-10-CM

## 2017-11-02 DIAGNOSIS — Z79.2 LONG TERM (CURRENT) USE OF ANTIBIOTICS: ICD-10-CM

## 2017-11-02 PROBLEM — D49.0 PAROTID TUMOR: Status: ACTIVE | Noted: 2017-11-02

## 2017-11-02 LAB
ABO + RH BLD: NORMAL
ABO + RH BLD: NORMAL
ALBUMIN SERPL-MCNC: 2.8 G/DL (ref 3.4–5)
ANION GAP SERPL CALCULATED.3IONS-SCNC: 5 MMOL/L (ref 3–14)
BLD GP AB SCN SERPL QL: NORMAL
BLD PROD TYP BPU: NORMAL
BLD PROD TYP BPU: NORMAL
BLD UNIT ID BPU: 0
BLOOD BANK CMNT PATIENT-IMP: NORMAL
BLOOD PRODUCT CODE: NORMAL
BPU ID: NORMAL
BUN SERPL-MCNC: 18 MG/DL (ref 7–30)
CA-I BLD-MCNC: 4.8 MG/DL (ref 4.4–5.2)
CALCIUM SERPL-MCNC: 8.5 MG/DL (ref 8.5–10.1)
CHLORIDE SERPL-SCNC: 112 MMOL/L (ref 94–109)
CO2 SERPL-SCNC: 26 MMOL/L (ref 20–32)
CREAT SERPL-MCNC: 0.96 MG/DL (ref 0.66–1.25)
ERYTHROCYTE [DISTWIDTH] IN BLOOD BY AUTOMATED COUNT: 13.8 % (ref 10–15)
ERYTHROCYTE [DISTWIDTH] IN BLOOD BY AUTOMATED COUNT: 13.9 % (ref 10–15)
ERYTHROCYTE [DISTWIDTH] IN BLOOD BY AUTOMATED COUNT: 14.4 % (ref 10–15)
GFR SERPL CREATININE-BSD FRML MDRD: 82 ML/MIN/1.7M2
GLUCOSE BLDC GLUCOMTR-MCNC: 104 MG/DL (ref 70–99)
GLUCOSE BLDC GLUCOMTR-MCNC: 125 MG/DL (ref 70–99)
GLUCOSE BLDC GLUCOMTR-MCNC: 126 MG/DL (ref 70–99)
GLUCOSE BLDC GLUCOMTR-MCNC: 145 MG/DL (ref 70–99)
GLUCOSE BLDC GLUCOMTR-MCNC: 154 MG/DL (ref 70–99)
GLUCOSE BLDC GLUCOMTR-MCNC: 174 MG/DL (ref 70–99)
GLUCOSE BLDC GLUCOMTR-MCNC: 188 MG/DL (ref 70–99)
GLUCOSE BLDC GLUCOMTR-MCNC: 66 MG/DL (ref 70–99)
GLUCOSE BLDC GLUCOMTR-MCNC: 71 MG/DL (ref 70–99)
GLUCOSE BLDC GLUCOMTR-MCNC: 71 MG/DL (ref 70–99)
GLUCOSE BLDC GLUCOMTR-MCNC: 72 MG/DL (ref 70–99)
GLUCOSE BLDC GLUCOMTR-MCNC: 77 MG/DL (ref 70–99)
GLUCOSE BLDC GLUCOMTR-MCNC: 86 MG/DL (ref 70–99)
GLUCOSE BLDC GLUCOMTR-MCNC: 90 MG/DL (ref 70–99)
GLUCOSE BLDC GLUCOMTR-MCNC: 97 MG/DL (ref 70–99)
GLUCOSE BLDC GLUCOMTR-MCNC: 97 MG/DL (ref 70–99)
GLUCOSE SERPL-MCNC: 110 MG/DL (ref 70–99)
GRAM STN SPEC: ABNORMAL
HCT VFR BLD AUTO: 30.9 % (ref 40–53)
HCT VFR BLD AUTO: 33.4 % (ref 40–53)
HCT VFR BLD AUTO: 34.7 % (ref 40–53)
HGB BLD-MCNC: 10.1 G/DL (ref 13.3–17.7)
HGB BLD-MCNC: 11.3 G/DL (ref 13.3–17.7)
HGB BLD-MCNC: 11.7 G/DL (ref 13.3–17.7)
INR PPP: 1.18 (ref 0.86–1.14)
Lab: ABNORMAL
MCH RBC QN AUTO: 34.5 PG (ref 26.5–33)
MCH RBC QN AUTO: 34.6 PG (ref 26.5–33)
MCH RBC QN AUTO: 34.8 PG (ref 26.5–33)
MCHC RBC AUTO-ENTMCNC: 32.7 G/DL (ref 31.5–36.5)
MCHC RBC AUTO-ENTMCNC: 33.7 G/DL (ref 31.5–36.5)
MCHC RBC AUTO-ENTMCNC: 33.8 G/DL (ref 31.5–36.5)
MCV RBC AUTO: 102 FL (ref 78–100)
MCV RBC AUTO: 103 FL (ref 78–100)
MCV RBC AUTO: 106 FL (ref 78–100)
NUM BPU REQUESTED: 1
PHOSPHATE SERPL-MCNC: 3.2 MG/DL (ref 2.5–4.5)
PLATELET # BLD AUTO: 38 10E9/L (ref 150–450)
PLATELET # BLD AUTO: 47 10E9/L (ref 150–450)
PLATELET # BLD AUTO: 85 10E9/L (ref 150–450)
POTASSIUM SERPL-SCNC: 3.8 MMOL/L (ref 3.4–5.3)
POTASSIUM SERPL-SCNC: 3.9 MMOL/L (ref 3.4–5.3)
RBC # BLD AUTO: 2.92 10E12/L (ref 4.4–5.9)
RBC # BLD AUTO: 3.25 10E12/L (ref 4.4–5.9)
RBC # BLD AUTO: 3.39 10E12/L (ref 4.4–5.9)
SODIUM SERPL-SCNC: 143 MMOL/L (ref 133–144)
SPECIMEN EXP DATE BLD: NORMAL
SPECIMEN SOURCE: ABNORMAL
SPECIMEN SOURCE: ABNORMAL
TRANSFUSION STATUS PATIENT QL: NORMAL
TRANSFUSION STATUS PATIENT QL: NORMAL
WBC # BLD AUTO: 10.2 10E9/L (ref 4–11)
WBC # BLD AUTO: 5.6 10E9/L (ref 4–11)
WBC # BLD AUTO: 5.9 10E9/L (ref 4–11)

## 2017-11-02 PROCEDURE — 25000128 H RX IP 250 OP 636: Performed by: NURSE ANESTHETIST, CERTIFIED REGISTERED

## 2017-11-02 PROCEDURE — 88307 TISSUE EXAM BY PATHOLOGIST: CPT | Performed by: OTOLARYNGOLOGY

## 2017-11-02 PROCEDURE — 87102 FUNGUS ISOLATION CULTURE: CPT | Performed by: OTOLARYNGOLOGY

## 2017-11-02 PROCEDURE — 0CB80ZZ EXCISION OF RIGHT PAROTID GLAND, OPEN APPROACH: ICD-10-PCS | Performed by: OTOLARYNGOLOGY

## 2017-11-02 PROCEDURE — 25000128 H RX IP 250 OP 636: Performed by: ANESTHESIOLOGY

## 2017-11-02 PROCEDURE — 84132 ASSAY OF SERUM POTASSIUM: CPT | Performed by: ANESTHESIOLOGY

## 2017-11-02 PROCEDURE — S0020 INJECTION, BUPIVICAINE HYDRO: HCPCS | Performed by: OTOLARYNGOLOGY

## 2017-11-02 PROCEDURE — 88305 TISSUE EXAM BY PATHOLOGIST: CPT | Performed by: OTOLARYNGOLOGY

## 2017-11-02 PROCEDURE — 86900 BLOOD TYPING SEROLOGIC ABO: CPT

## 2017-11-02 PROCEDURE — 25000125 ZZHC RX 250: Performed by: ANESTHESIOLOGY

## 2017-11-02 PROCEDURE — 85027 COMPLETE CBC AUTOMATED: CPT | Performed by: ANESTHESIOLOGY

## 2017-11-02 PROCEDURE — 71000014 ZZH RECOVERY PHASE 1 LEVEL 2 FIRST HR: Performed by: OTOLARYNGOLOGY

## 2017-11-02 PROCEDURE — 36415 COLL VENOUS BLD VENIPUNCTURE: CPT | Performed by: ANESTHESIOLOGY

## 2017-11-02 PROCEDURE — 27210794 ZZH OR GENERAL SUPPLY STERILE: Performed by: OTOLARYNGOLOGY

## 2017-11-02 PROCEDURE — 25800025 ZZH RX 258: Performed by: NURSE ANESTHETIST, CERTIFIED REGISTERED

## 2017-11-02 PROCEDURE — 40000170 ZZH STATISTIC PRE-PROCEDURE ASSESSMENT II: Performed by: OTOLARYNGOLOGY

## 2017-11-02 PROCEDURE — 25000132 ZZH RX MED GY IP 250 OP 250 PS 637: Mod: GY | Performed by: STUDENT IN AN ORGANIZED HEALTH CARE EDUCATION/TRAINING PROGRAM

## 2017-11-02 PROCEDURE — A9270 NON-COVERED ITEM OR SERVICE: HCPCS | Mod: GY | Performed by: STUDENT IN AN ORGANIZED HEALTH CARE EDUCATION/TRAINING PROGRAM

## 2017-11-02 PROCEDURE — 82962 GLUCOSE BLOOD TEST: CPT

## 2017-11-02 PROCEDURE — P9037 PLATE PHERES LEUKOREDU IRRAD: HCPCS | Performed by: STUDENT IN AN ORGANIZED HEALTH CARE EDUCATION/TRAINING PROGRAM

## 2017-11-02 PROCEDURE — 00000102 ZZHCL STATISTIC CYTO WRIGHT STAIN TC: Performed by: OTOLARYNGOLOGY

## 2017-11-02 PROCEDURE — 25000128 H RX IP 250 OP 636: Performed by: OTOLARYNGOLOGY

## 2017-11-02 PROCEDURE — 00QK0ZZ REPAIR TRIGEMINAL NERVE, OPEN APPROACH: ICD-10-PCS | Performed by: OTOLARYNGOLOGY

## 2017-11-02 PROCEDURE — 00000155 ZZHCL STATISTIC H-CELL BLOCK W/STAIN: Performed by: OTOLARYNGOLOGY

## 2017-11-02 PROCEDURE — 25000125 ZZHC RX 250: Performed by: OTOLARYNGOLOGY

## 2017-11-02 PROCEDURE — 86901 BLOOD TYPING SEROLOGIC RH(D): CPT

## 2017-11-02 PROCEDURE — 82330 ASSAY OF CALCIUM: CPT | Performed by: ANESTHESIOLOGY

## 2017-11-02 PROCEDURE — 37000008 ZZH ANESTHESIA TECHNICAL FEE, 1ST 30 MIN: Performed by: OTOLARYNGOLOGY

## 2017-11-02 PROCEDURE — 25000125 ZZHC RX 250: Performed by: STUDENT IN AN ORGANIZED HEALTH CARE EDUCATION/TRAINING PROGRAM

## 2017-11-02 PROCEDURE — 80069 RENAL FUNCTION PANEL: CPT | Performed by: ANESTHESIOLOGY

## 2017-11-02 PROCEDURE — 71000015 ZZH RECOVERY PHASE 1 LEVEL 2 EA ADDTL HR: Performed by: OTOLARYNGOLOGY

## 2017-11-02 PROCEDURE — 25000566 ZZH SEVOFLURANE, EA 15 MIN: Performed by: OTOLARYNGOLOGY

## 2017-11-02 PROCEDURE — 36000064 ZZH SURGERY LEVEL 4 EA 15 ADDTL MIN - UMMC: Performed by: OTOLARYNGOLOGY

## 2017-11-02 PROCEDURE — 88331 PATH CONSLTJ SURG 1 BLK 1SPC: CPT | Performed by: OTOLARYNGOLOGY

## 2017-11-02 PROCEDURE — 37000009 ZZH ANESTHESIA TECHNICAL FEE, EACH ADDTL 15 MIN: Performed by: OTOLARYNGOLOGY

## 2017-11-02 PROCEDURE — 4A11X4G MONITORING OF PERIPHERAL NERVOUS ELECTRICAL ACTIVITY, INTRAOPERATIVE, EXTERNAL APPROACH: ICD-10-PCS | Performed by: OTOLARYNGOLOGY

## 2017-11-02 PROCEDURE — P9041 ALBUMIN (HUMAN),5%, 50ML: HCPCS | Performed by: NURSE ANESTHETIST, CERTIFIED REGISTERED

## 2017-11-02 PROCEDURE — 85027 COMPLETE CBC AUTOMATED: CPT | Performed by: OTOLARYNGOLOGY

## 2017-11-02 PROCEDURE — 87075 CULTR BACTERIA EXCEPT BLOOD: CPT | Performed by: OTOLARYNGOLOGY

## 2017-11-02 PROCEDURE — 25000128 H RX IP 250 OP 636: Performed by: STUDENT IN AN ORGANIZED HEALTH CARE EDUCATION/TRAINING PROGRAM

## 2017-11-02 PROCEDURE — 25000125 ZZHC RX 250: Performed by: NURSE ANESTHETIST, CERTIFIED REGISTERED

## 2017-11-02 PROCEDURE — 87070 CULTURE OTHR SPECIMN AEROBIC: CPT | Performed by: OTOLARYNGOLOGY

## 2017-11-02 PROCEDURE — 87205 SMEAR GRAM STAIN: CPT | Performed by: OTOLARYNGOLOGY

## 2017-11-02 PROCEDURE — 36000062 ZZH SURGERY LEVEL 4 1ST 30 MIN - UMMC: Performed by: OTOLARYNGOLOGY

## 2017-11-02 PROCEDURE — 87186 SC STD MICRODIL/AGAR DIL: CPT | Performed by: OTOLARYNGOLOGY

## 2017-11-02 PROCEDURE — 27210995 ZZH RX 272: Performed by: OTOLARYNGOLOGY

## 2017-11-02 PROCEDURE — 85610 PROTHROMBIN TIME: CPT | Performed by: ANESTHESIOLOGY

## 2017-11-02 PROCEDURE — 87077 CULTURE AEROBIC IDENTIFY: CPT | Performed by: OTOLARYNGOLOGY

## 2017-11-02 PROCEDURE — 87176 TISSUE HOMOGENIZATION CULTR: CPT | Performed by: OTOLARYNGOLOGY

## 2017-11-02 PROCEDURE — 86850 RBC ANTIBODY SCREEN: CPT

## 2017-11-02 PROCEDURE — 88112 CYTOPATH CELL ENHANCE TECH: CPT | Performed by: OTOLARYNGOLOGY

## 2017-11-02 RX ORDER — BACITRACIN ZINC 500 [USP'U]/G
OINTMENT TOPICAL PRN
Status: DISCONTINUED | OUTPATIENT
Start: 2017-11-02 | End: 2017-11-02 | Stop reason: HOSPADM

## 2017-11-02 RX ORDER — PROPOFOL 10 MG/ML
INJECTION, EMULSION INTRAVENOUS PRN
Status: DISCONTINUED | OUTPATIENT
Start: 2017-11-02 | End: 2017-11-02

## 2017-11-02 RX ORDER — LANOLIN ALCOHOL/MO/W.PET/CERES
1000 CREAM (GRAM) TOPICAL EVERY MORNING
Status: DISCONTINUED | OUTPATIENT
Start: 2017-11-03 | End: 2017-11-06 | Stop reason: HOSPADM

## 2017-11-02 RX ORDER — CEFAZOLIN SODIUM 2 G/100ML
2 INJECTION, SOLUTION INTRAVENOUS
Status: COMPLETED | OUTPATIENT
Start: 2017-11-02 | End: 2017-11-02

## 2017-11-02 RX ORDER — ACETAMINOPHEN 325 MG/1
975 TABLET ORAL EVERY 8 HOURS
Status: CANCELLED | OUTPATIENT
Start: 2017-11-02

## 2017-11-02 RX ORDER — HYDROMORPHONE HYDROCHLORIDE 1 MG/ML
.3-.5 INJECTION, SOLUTION INTRAMUSCULAR; INTRAVENOUS; SUBCUTANEOUS EVERY 4 HOURS PRN
Status: DISCONTINUED | OUTPATIENT
Start: 2017-11-02 | End: 2017-11-05

## 2017-11-02 RX ORDER — LIDOCAINE 40 MG/G
CREAM TOPICAL
Status: DISCONTINUED | OUTPATIENT
Start: 2017-11-02 | End: 2017-11-02 | Stop reason: HOSPADM

## 2017-11-02 RX ORDER — DEXTROSE MONOHYDRATE 25 G/50ML
INJECTION, SOLUTION INTRAVENOUS PRN
Status: DISCONTINUED | OUTPATIENT
Start: 2017-11-02 | End: 2017-11-02

## 2017-11-02 RX ORDER — AMPICILLIN AND SULBACTAM 2; 1 G/1; G/1
3 INJECTION, POWDER, FOR SOLUTION INTRAMUSCULAR; INTRAVENOUS EVERY 6 HOURS
Status: DISCONTINUED | OUTPATIENT
Start: 2017-11-02 | End: 2017-11-05

## 2017-11-02 RX ORDER — FENTANYL CITRATE 50 UG/ML
25-50 INJECTION, SOLUTION INTRAMUSCULAR; INTRAVENOUS
Status: DISCONTINUED | OUTPATIENT
Start: 2017-11-02 | End: 2017-11-02 | Stop reason: HOSPADM

## 2017-11-02 RX ORDER — PROCHLORPERAZINE MALEATE 5 MG
5-10 TABLET ORAL EVERY 6 HOURS PRN
Status: DISCONTINUED | OUTPATIENT
Start: 2017-11-02 | End: 2017-11-06 | Stop reason: HOSPADM

## 2017-11-02 RX ORDER — CEFAZOLIN SODIUM 1 G/3ML
INJECTION, POWDER, FOR SOLUTION INTRAMUSCULAR; INTRAVENOUS PRN
Status: DISCONTINUED | OUTPATIENT
Start: 2017-11-02 | End: 2017-11-02

## 2017-11-02 RX ORDER — LABETALOL HYDROCHLORIDE 5 MG/ML
10 INJECTION, SOLUTION INTRAVENOUS
Status: DISCONTINUED | OUTPATIENT
Start: 2017-11-02 | End: 2017-11-02 | Stop reason: HOSPADM

## 2017-11-02 RX ORDER — DEXTROSE MONOHYDRATE 25 G/50ML
25-50 INJECTION, SOLUTION INTRAVENOUS
Status: DISCONTINUED | OUTPATIENT
Start: 2017-11-02 | End: 2017-11-03

## 2017-11-02 RX ORDER — OLANZAPINE 2.5 MG/1
2.5 TABLET, FILM COATED ORAL AT BEDTIME
Status: DISCONTINUED | OUTPATIENT
Start: 2017-11-02 | End: 2017-11-06 | Stop reason: HOSPADM

## 2017-11-02 RX ORDER — SODIUM CHLORIDE, SODIUM LACTATE, POTASSIUM CHLORIDE, CALCIUM CHLORIDE 600; 310; 30; 20 MG/100ML; MG/100ML; MG/100ML; MG/100ML
INJECTION, SOLUTION INTRAVENOUS CONTINUOUS PRN
Status: DISCONTINUED | OUTPATIENT
Start: 2017-11-02 | End: 2017-11-02

## 2017-11-02 RX ORDER — ONDANSETRON 4 MG/1
4 TABLET, ORALLY DISINTEGRATING ORAL EVERY 30 MIN PRN
Status: DISCONTINUED | OUTPATIENT
Start: 2017-11-02 | End: 2017-11-02 | Stop reason: HOSPADM

## 2017-11-02 RX ORDER — SODIUM CHLORIDE, SODIUM LACTATE, POTASSIUM CHLORIDE, CALCIUM CHLORIDE 600; 310; 30; 20 MG/100ML; MG/100ML; MG/100ML; MG/100ML
INJECTION, SOLUTION INTRAVENOUS CONTINUOUS
Status: DISCONTINUED | OUTPATIENT
Start: 2017-11-02 | End: 2017-11-02 | Stop reason: HOSPADM

## 2017-11-02 RX ORDER — SODIUM CHLORIDE 9 MG/ML
INJECTION, SOLUTION INTRAVENOUS CONTINUOUS
Status: DISCONTINUED | OUTPATIENT
Start: 2017-11-02 | End: 2017-11-04

## 2017-11-02 RX ORDER — BUPIVACAINE HYDROCHLORIDE 2.5 MG/ML
INJECTION, SOLUTION INFILTRATION; PERINEURAL PRN
Status: DISCONTINUED | OUTPATIENT
Start: 2017-11-02 | End: 2017-11-02 | Stop reason: HOSPADM

## 2017-11-02 RX ORDER — ONDANSETRON 4 MG/1
4 TABLET, ORALLY DISINTEGRATING ORAL EVERY 6 HOURS PRN
Status: DISCONTINUED | OUTPATIENT
Start: 2017-11-02 | End: 2017-11-06 | Stop reason: HOSPADM

## 2017-11-02 RX ORDER — ALBUMIN, HUMAN INJ 5% 5 %
SOLUTION INTRAVENOUS CONTINUOUS PRN
Status: DISCONTINUED | OUTPATIENT
Start: 2017-11-02 | End: 2017-11-02

## 2017-11-02 RX ORDER — ONDANSETRON 2 MG/ML
INJECTION INTRAMUSCULAR; INTRAVENOUS PRN
Status: DISCONTINUED | OUTPATIENT
Start: 2017-11-02 | End: 2017-11-02

## 2017-11-02 RX ORDER — SPIRONOLACTONE 25 MG/1
25 TABLET ORAL EVERY MORNING
Status: DISCONTINUED | OUTPATIENT
Start: 2017-11-03 | End: 2017-11-06 | Stop reason: HOSPADM

## 2017-11-02 RX ORDER — NICOTINE POLACRILEX 4 MG
15-30 LOZENGE BUCCAL
Status: DISCONTINUED | OUTPATIENT
Start: 2017-11-02 | End: 2017-11-03

## 2017-11-02 RX ORDER — OXYCODONE HYDROCHLORIDE 5 MG/1
5 TABLET ORAL
Status: DISCONTINUED | OUTPATIENT
Start: 2017-11-02 | End: 2017-11-06 | Stop reason: HOSPADM

## 2017-11-02 RX ORDER — LACTULOSE 10 G/15ML
30 SOLUTION ORAL 4 TIMES DAILY
Status: DISCONTINUED | OUTPATIENT
Start: 2017-11-02 | End: 2017-11-03

## 2017-11-02 RX ORDER — ONDANSETRON 2 MG/ML
4 INJECTION INTRAMUSCULAR; INTRAVENOUS EVERY 30 MIN PRN
Status: DISCONTINUED | OUTPATIENT
Start: 2017-11-02 | End: 2017-11-02 | Stop reason: HOSPADM

## 2017-11-02 RX ORDER — DEXTROSE MONOHYDRATE 50 MG/ML
INJECTION, SOLUTION INTRAVENOUS CONTINUOUS PRN
Status: DISCONTINUED | OUTPATIENT
Start: 2017-11-02 | End: 2017-11-02

## 2017-11-02 RX ORDER — NALOXONE HYDROCHLORIDE 0.4 MG/ML
.1-.4 INJECTION, SOLUTION INTRAMUSCULAR; INTRAVENOUS; SUBCUTANEOUS
Status: DISCONTINUED | OUTPATIENT
Start: 2017-11-02 | End: 2017-11-06 | Stop reason: HOSPADM

## 2017-11-02 RX ORDER — PRAVASTATIN SODIUM 20 MG
20 TABLET ORAL DAILY
Status: DISCONTINUED | OUTPATIENT
Start: 2017-11-02 | End: 2017-11-06 | Stop reason: HOSPADM

## 2017-11-02 RX ORDER — FENTANYL CITRATE 50 UG/ML
INJECTION, SOLUTION INTRAMUSCULAR; INTRAVENOUS PRN
Status: DISCONTINUED | OUTPATIENT
Start: 2017-11-02 | End: 2017-11-02

## 2017-11-02 RX ORDER — CARVEDILOL 3.12 MG/1
12.5 TABLET ORAL 2 TIMES DAILY WITH MEALS
Status: DISCONTINUED | OUTPATIENT
Start: 2017-11-02 | End: 2017-11-06 | Stop reason: HOSPADM

## 2017-11-02 RX ORDER — ONDANSETRON 2 MG/ML
4 INJECTION INTRAMUSCULAR; INTRAVENOUS EVERY 6 HOURS PRN
Status: DISCONTINUED | OUTPATIENT
Start: 2017-11-02 | End: 2017-11-06 | Stop reason: HOSPADM

## 2017-11-02 RX ORDER — LIDOCAINE HYDROCHLORIDE 20 MG/ML
INJECTION, SOLUTION INFILTRATION; PERINEURAL PRN
Status: DISCONTINUED | OUTPATIENT
Start: 2017-11-02 | End: 2017-11-02

## 2017-11-02 RX ORDER — POTASSIUM CHLORIDE 750 MG/1
20 TABLET, EXTENDED RELEASE ORAL EVERY MORNING
Status: DISCONTINUED | OUTPATIENT
Start: 2017-11-03 | End: 2017-11-06 | Stop reason: HOSPADM

## 2017-11-02 RX ADMIN — FENTANYL CITRATE 50 MCG: 50 INJECTION, SOLUTION INTRAMUSCULAR; INTRAVENOUS at 08:10

## 2017-11-02 RX ADMIN — PHENYLEPHRINE HYDROCHLORIDE 100 MCG: 10 INJECTION, SOLUTION INTRAMUSCULAR; INTRAVENOUS; SUBCUTANEOUS at 12:32

## 2017-11-02 RX ADMIN — CEFAZOLIN 1 G: 1 INJECTION, POWDER, FOR SOLUTION INTRAMUSCULAR; INTRAVENOUS at 10:20

## 2017-11-02 RX ADMIN — SODIUM CHLORIDE, POTASSIUM CHLORIDE, SODIUM LACTATE AND CALCIUM CHLORIDE: 600; 310; 30; 20 INJECTION, SOLUTION INTRAVENOUS at 08:52

## 2017-11-02 RX ADMIN — LACTULOSE 30 G: 20 SOLUTION ORAL at 20:54

## 2017-11-02 RX ADMIN — CEFAZOLIN 1 G: 1 INJECTION, POWDER, FOR SOLUTION INTRAMUSCULAR; INTRAVENOUS at 14:20

## 2017-11-02 RX ADMIN — DEXTROSE MONOHYDRATE 25 ML: 25 INJECTION, SOLUTION INTRAVENOUS at 12:59

## 2017-11-02 RX ADMIN — PHENYLEPHRINE HYDROCHLORIDE 100 MCG: 10 INJECTION, SOLUTION INTRAMUSCULAR; INTRAVENOUS; SUBCUTANEOUS at 13:55

## 2017-11-02 RX ADMIN — PHENYLEPHRINE HYDROCHLORIDE 50 MCG: 10 INJECTION, SOLUTION INTRAMUSCULAR; INTRAVENOUS; SUBCUTANEOUS at 13:21

## 2017-11-02 RX ADMIN — FENTANYL CITRATE 50 MCG: 50 INJECTION, SOLUTION INTRAMUSCULAR; INTRAVENOUS at 15:31

## 2017-11-02 RX ADMIN — CARVEDILOL 12.5 MG: 12.5 TABLET, FILM COATED ORAL at 20:55

## 2017-11-02 RX ADMIN — PHENYLEPHRINE HYDROCHLORIDE 50 MCG: 10 INJECTION, SOLUTION INTRAMUSCULAR; INTRAVENOUS; SUBCUTANEOUS at 12:18

## 2017-11-02 RX ADMIN — SODIUM CHLORIDE, POTASSIUM CHLORIDE, SODIUM LACTATE AND CALCIUM CHLORIDE: 600; 310; 30; 20 INJECTION, SOLUTION INTRAVENOUS at 15:27

## 2017-11-02 RX ADMIN — PHENYLEPHRINE HYDROCHLORIDE 100 MCG: 10 INJECTION, SOLUTION INTRAMUSCULAR; INTRAVENOUS; SUBCUTANEOUS at 08:31

## 2017-11-02 RX ADMIN — AMPICILLIN SODIUM AND SULBACTAM SODIUM 3 G: 2; 1 INJECTION, POWDER, FOR SOLUTION INTRAMUSCULAR; INTRAVENOUS at 20:56

## 2017-11-02 RX ADMIN — SODIUM CHLORIDE, POTASSIUM CHLORIDE, SODIUM LACTATE AND CALCIUM CHLORIDE: 600; 310; 30; 20 INJECTION, SOLUTION INTRAVENOUS at 07:50

## 2017-11-02 RX ADMIN — MINERAL OIL AND WHITE PETROLATUM: 150; 830 OINTMENT OPHTHALMIC at 21:19

## 2017-11-02 RX ADMIN — SODIUM CHLORIDE: 9 INJECTION, SOLUTION INTRAVENOUS at 20:29

## 2017-11-02 RX ADMIN — CEFAZOLIN SODIUM 2 G: 2 INJECTION, SOLUTION INTRAVENOUS at 08:20

## 2017-11-02 RX ADMIN — DEXTROSE MONOHYDRATE: 50 INJECTION, SOLUTION INTRAVENOUS at 15:17

## 2017-11-02 RX ADMIN — PHENYLEPHRINE HYDROCHLORIDE 100 MCG: 10 INJECTION, SOLUTION INTRAMUSCULAR; INTRAVENOUS; SUBCUTANEOUS at 12:39

## 2017-11-02 RX ADMIN — Medication 100 MG: at 08:10

## 2017-11-02 RX ADMIN — PHENYLEPHRINE HYDROCHLORIDE 100 MCG: 10 INJECTION, SOLUTION INTRAMUSCULAR; INTRAVENOUS; SUBCUTANEOUS at 08:54

## 2017-11-02 RX ADMIN — PHENYLEPHRINE HYDROCHLORIDE 150 MCG: 10 INJECTION, SOLUTION INTRAMUSCULAR; INTRAVENOUS; SUBCUTANEOUS at 09:28

## 2017-11-02 RX ADMIN — OMEPRAZOLE 40 MG: 20 CAPSULE, DELAYED RELEASE ORAL at 20:54

## 2017-11-02 RX ADMIN — PHENYLEPHRINE HYDROCHLORIDE 100 MCG: 10 INJECTION, SOLUTION INTRAMUSCULAR; INTRAVENOUS; SUBCUTANEOUS at 13:04

## 2017-11-02 RX ADMIN — PHENYLEPHRINE HYDROCHLORIDE 100 MCG: 10 INJECTION, SOLUTION INTRAMUSCULAR; INTRAVENOUS; SUBCUTANEOUS at 13:45

## 2017-11-02 RX ADMIN — PHENYLEPHRINE HYDROCHLORIDE 100 MCG: 10 INJECTION, SOLUTION INTRAMUSCULAR; INTRAVENOUS; SUBCUTANEOUS at 08:33

## 2017-11-02 RX ADMIN — LIDOCAINE HYDROCHLORIDE 60 MG: 20 INJECTION, SOLUTION INFILTRATION; PERINEURAL at 08:10

## 2017-11-02 RX ADMIN — PHENYLEPHRINE HYDROCHLORIDE 100 MCG: 10 INJECTION, SOLUTION INTRAMUSCULAR; INTRAVENOUS; SUBCUTANEOUS at 14:05

## 2017-11-02 RX ADMIN — PHENYLEPHRINE HYDROCHLORIDE 100 MCG: 10 INJECTION, SOLUTION INTRAMUSCULAR; INTRAVENOUS; SUBCUTANEOUS at 08:35

## 2017-11-02 RX ADMIN — REMIFENTANIL HYDROCHLORIDE 0.05 MCG/KG/MIN: 1 INJECTION, POWDER, LYOPHILIZED, FOR SOLUTION INTRAVENOUS at 08:52

## 2017-11-02 RX ADMIN — RIFAXIMIN 550 MG: 550 TABLET ORAL at 20:56

## 2017-11-02 RX ADMIN — CEFAZOLIN 1 G: 1 INJECTION, POWDER, FOR SOLUTION INTRAMUSCULAR; INTRAVENOUS at 12:20

## 2017-11-02 RX ADMIN — PHENYLEPHRINE HYDROCHLORIDE 100 MCG: 10 INJECTION, SOLUTION INTRAMUSCULAR; INTRAVENOUS; SUBCUTANEOUS at 09:37

## 2017-11-02 RX ADMIN — ALBUMIN (HUMAN): 12.5 SOLUTION INTRAVENOUS at 12:39

## 2017-11-02 RX ADMIN — SODIUM CHLORIDE, POTASSIUM CHLORIDE, SODIUM LACTATE AND CALCIUM CHLORIDE: 600; 310; 30; 20 INJECTION, SOLUTION INTRAVENOUS at 09:15

## 2017-11-02 RX ADMIN — PHENYLEPHRINE HYDROCHLORIDE 100 MCG: 10 INJECTION, SOLUTION INTRAMUSCULAR; INTRAVENOUS; SUBCUTANEOUS at 09:01

## 2017-11-02 RX ADMIN — DEXTROSE AND SODIUM CHLORIDE: 5; 900 INJECTION, SOLUTION INTRAVENOUS at 17:30

## 2017-11-02 RX ADMIN — MIDAZOLAM HYDROCHLORIDE 1 MG: 1 INJECTION, SOLUTION INTRAMUSCULAR; INTRAVENOUS at 07:50

## 2017-11-02 RX ADMIN — HUMAN INSULIN 1.5 UNITS/HR: 100 INJECTION, SOLUTION SUBCUTANEOUS at 07:29

## 2017-11-02 RX ADMIN — PROPOFOL 150 MG: 10 INJECTION, EMULSION INTRAVENOUS at 08:10

## 2017-11-02 RX ADMIN — PHENYLEPHRINE HYDROCHLORIDE 50 MCG: 10 INJECTION, SOLUTION INTRAMUSCULAR; INTRAVENOUS; SUBCUTANEOUS at 11:04

## 2017-11-02 RX ADMIN — SODIUM CHLORIDE, POTASSIUM CHLORIDE, SODIUM LACTATE AND CALCIUM CHLORIDE: 600; 310; 30; 20 INJECTION, SOLUTION INTRAVENOUS at 07:29

## 2017-11-02 RX ADMIN — OLANZAPINE 2.5 MG: 2.5 TABLET, FILM COATED ORAL at 22:07

## 2017-11-02 RX ADMIN — ONDANSETRON 4 MG: 2 INJECTION INTRAMUSCULAR; INTRAVENOUS at 15:23

## 2017-11-02 RX ADMIN — DEXTROSE MONOHYDRATE 25 ML: 25 INJECTION, SOLUTION INTRAVENOUS at 15:28

## 2017-11-02 RX ADMIN — MIDAZOLAM HYDROCHLORIDE 1 MG: 1 INJECTION, SOLUTION INTRAMUSCULAR; INTRAVENOUS at 07:52

## 2017-11-02 RX ADMIN — PHENYLEPHRINE HYDROCHLORIDE 100 MCG: 10 INJECTION, SOLUTION INTRAMUSCULAR; INTRAVENOUS; SUBCUTANEOUS at 08:45

## 2017-11-02 RX ADMIN — DEXTRAN 70, AND HYPROMELLOSE 2910 2 DROP: 1; 3 SOLUTION/ DROPS OPHTHALMIC at 20:52

## 2017-11-02 RX ADMIN — DEXTROSE MONOHYDRATE 25 ML: 25 INJECTION, SOLUTION INTRAVENOUS at 11:03

## 2017-11-02 RX ADMIN — PHENYLEPHRINE HYDROCHLORIDE 100 MCG: 10 INJECTION, SOLUTION INTRAMUSCULAR; INTRAVENOUS; SUBCUTANEOUS at 13:39

## 2017-11-02 RX ADMIN — PRAVASTATIN SODIUM 20 MG: 20 TABLET ORAL at 22:07

## 2017-11-02 RX ADMIN — ALBUMIN (HUMAN): 12.5 SOLUTION INTRAVENOUS at 09:28

## 2017-11-02 RX ADMIN — PHENYLEPHRINE HYDROCHLORIDE 0.5 MCG/KG/MIN: 10 INJECTION, SOLUTION INTRAMUSCULAR; INTRAVENOUS; SUBCUTANEOUS at 09:37

## 2017-11-02 ASSESSMENT — PAIN DESCRIPTION - DESCRIPTORS
DESCRIPTORS: OTHER (COMMENT)
DESCRIPTORS: OTHER (COMMENT)

## 2017-11-02 ASSESSMENT — VISUAL ACUITY: OU: GLASSES

## 2017-11-02 NOTE — OR NURSING
"Lab called with platelet count now 38K.. Dr. Huerta at bedside and aware.  No orders at this time. Dr. CARYL Pratt was text paged for Dr. Huerta to discuss IV fluid management orders. Pt arrived to PACU with D5W infusing a 50ml/hr and that was increased just after his arrival to 100 cc/hr.  Pt. Is mostly oriented to himself, his birthdate, year, location and situation.  He does not remember Dr. CARYL Pratt being here at 1630 to talk to him. Frequently asking the same questions about why his surgery took so long and \"can I go home now\". Verbalizes teeth pain and also expresses that he does not want drugs for pain.    "

## 2017-11-02 NOTE — OR NURSING
Dr Huerta at bedside, MD okay with stopped IVF D5NS, pt , LEVI mitchellay with pt eating jorgito crackers. Pt signed out and waiting for 6D and plt orders.

## 2017-11-02 NOTE — IP AVS SNAPSHOT
MRN:[id number]                      After Visit Summary   11/2/2017    Frandy Workman    MRN: [id number]           Thank you!     Thank you for choosing [facility] for your care. Our goal is always to provide you with excellent care. Hearing back from our patients is one way we can continue to improve our services. Please take a few minutes to complete the written survey that you may receive in the mail after you visit with us. Thank you!        Patient Information     Date Of Birth          1965        Designated Caregiver       Most Recent Value    Caregiver    Will someone help with your care after discharge? yes    Name of designated caregiver  [employer]    Phone number of caregiver [phone]    Caregiver address [address] [address]      About your hospital stay     You were admitted on:  November 2, 2017 You last received care in the:  [address] [place] [address]    You were discharged on:  November 6, 2017        Reason for your hospital stay       Post-operative care                  Who to Call     For medical emergencies, please call 911.  For non-urgent questions about your medical care, please call your primary care provider or clinic, [phone]  For questions related to your surgery, please call your surgery clinic        Attending Provider     Provider Asiya Dorantes MD Otolaryngology       Primary Care Provider Office Phone # Fax #    Natalie Mitzi Russell [phone]       When to contact your care team       Please notify your doctor if you experience wound breakdown, sustained bleeding from the wound site, or increasing redness, swelling, and/or purulent malorodorous discharge from the wound site which may indicate infection. If you feel it is acute, or experience sudden changes in breathing, chest pain, or excessive sleepiness/somnolence please return to the emergency department or call 911. If you have questions or  "concerns during the day please call ENT clinic and [phone]. If at night you can call [facility] at [phone] and ask for the \"ENT resident on call\".                  After Care Instructions     Activity       Your activity upon discharge: No heavy lifting greater than 10 lbs and no strenuous exercise for 2 weeks or until follow up appointment. No driving while taking narcotic pain medications.            Diet       Follow this diet upon discharge: Resume pre-procedure diet            Wound care and dressings       Instructions to care for your wound at home: Keep incisions clean and dry. Apply Aquaphor ointment to incisions three times daily to keep moist. You may shower, do not soak, scrub, or submerge incisions under water. If you have a surgical drain, do not get the drain site wet until 24 hours after the drain has been removed.                  Follow-up Appointments     Adult [facility] Follow-up and recommended labs and tests       Follow up in ENT clinic with Dr. Morgan on Friday, November 10th at 2:20PM. Please call the clinic with questions/concerns: [phone].     Otolaryngology/ENT Clinic:  [facility]  Clinics & Surgery Center  [address] [place] [address]    Follow up in infectious disease clinic on November 17th. You should receive a phone call to schedule this appointment. If you do not hear from the clinic, please call: [phone]    [facility]  Clinics and Surgery Center  Floor [address]  [address] [place] [address]    Appointments on [place] and/or [facility] (with [facility] or [facility] provider or service). Call [phone] if you haven't heard regarding these appointments within 7 days of discharge.                  Your next 10 appointments already scheduled     Nov 07, 2017  4:00 PM CST   Infusion 60 with UC SPEC INFUSION, UC 46 ATC   [facility] ([facility])    [address] [place] [id number] [phone]            Nov 08, 2017  5:00 PM CST   Infusion 60 with UC SPEC INFUSION, UC 47 ATC   [facility] Procedure ([facility])    [address] [place] [id number] [phone]            Nov 09, 2017  4:00 PM CST   Infusion 60 with UC SPEC INFUSION, UC 50 ATC   [facility] ([facility])    [address] [place] [id number] [phone]            Nov 10, 2017  2:40 PM CST   (Arrive by 2:25 PM)   RETURN TUMOR VISIT with Asiya Morgan MD   [facility] ([facility])    [address] [place] [id number] [phone]            Nov 10, 2017  3:00 PM CST   Infusion 60 with UC SPEC INFUSION, UC 51 ATC   [facility] ([facility])    [address] [place] [id number] [phone]            Nov 11, 2017  2:00 PM CST   Infusion 60 with UC SPEC INFUSION   [facility] ([facility])    [address] [place] [id number] [phone]              Additional Services     Infectious Disease Referral       Please schedule follow-up with Dr. Noel on 11/17/17, per her request. Thank you.                  Further instructions from your care team       Daily Outpatient IV Infusions:     Location:    [facility]   [address] [place] [address]    Phone:  [phone]    The Outpatient Infusion Center will flush the PICC line and change the dressing per their protocol.            Pending Results     Date and Time Order Name Status Description    11/6/2017 1256 IR PICC  "Vascular In process     11/2/2017 1400 Fungus Culture, non-blood Preliminary     11/2/2017 1400 Anaerobic bacterial culture Preliminary     11/2/2017 1230 Fungus Culture, non-blood Preliminary     11/2/2017 1230 Anaerobic bacterial culture Preliminary     11/2/2017 0925 Surgical pathology exam Preliminary             Statement of Approval     Ordered          11/06/17 1420  I have reviewed and agree with all the recommendations and orders detailed in this document.  EFFECTIVE NOW     Approved and electronically signed by:  Kera Dillon PA-C             Admission Information     Date & Time Provider Department Dept. Phone    11/2/2017 Asiya Morgan MD [address] [facility] [phone]      Your Vitals Were     Blood Pressure Pulse Temperature Respirations Height Weight    123/63 68 97.8  F (36.6  C) (Oral) 16 1.75 m (5' 8.9\") 86.2 kg (190 lb 0.6 oz)    Pulse Oximetry BMI (Body Mass Index)                100% 28.15 kg/m2          [employer] Information     [employer] gives you secure access to your electronic health record. If you see a primary care provider, you can also send messages to your care team and make appointments. If you have questions, please call your primary care clinic.  If you do not have a primary care provider, please call [phone] and they will assist you.        Care EveryWhere ID     This is your Care EveryWhere ID. This could be used by other organizations to access your [facility] medical records  [id number]        Equal Access to Services     MINDI RODRIGUEZ : Hadii curly huntero Somichael, waaxda luqadaha, qaybta kaalmada adeegzhenda, waxay idishanell vuong. So [facility] [phone].    ATENCIÓN: Si habla español, tiene a hanley disposición servicios gratuitos de asistencia lingüística. Llame al [phone].    We comply with applicable federal civil rights laws and [place] laws. We do not discriminate on the basis of race, color, national origin, age, disability, sex, " sexual orientation, or gender identity.               Review of your medicines      START taking        Dose / Directions    artificial tears Oint ophthalmic ointment   Used for:  Dry eyes        Dose:  1 inch   Place 1 g into the right eye At Bedtime   Quantity:  5 g   Refills:  3       ertapenem 1 GM vial   Commonly known as:  INVanz   Indication:  Abscess        Dose:  1 g   Inject 1 g into the vein every 24 hours for 14 days   Quantity:  10 each   Refills:  0       hypromellose-dextran Soln ophthalmic solution   Used for:  Dry eyes        Dose:  1 drop   Place 1 drop into the right eye every hour as needed for dry eyes Apply at least 4 times daily and as needed for dry eye   Quantity:  1 Bottle   Refills:  3       lactobacillus rhamnosus (GG) capsule   Used for:  Long term (current) use of antibiotics        Dose:  1 capsule   Take 1 capsule by mouth 2 times daily for 14 days   Quantity:  28 capsule   Refills:  0       oxyCODONE IR 5 MG tablet   Commonly known as:  ROXICODONE   Used for:  Acute post-operative pain        Dose:  5 mg   Take 1 tablet (5 mg) by mouth every 3 hours as needed for moderate to severe pain   Quantity:  18 tablet   Refills:  0         CONTINUE these medicines which may have CHANGED, or have new prescriptions. If we are uncertain of the size of tablets/capsules you have at home, strength may be listed as something that might have changed.        Dose / Directions    dapagliflozin 10 MG Tabs tablet   Commonly known as:  FARXIGA   This may have changed:  when to take this   Used for:  Type 2 diabetes mellitus with hyperglycemia, with long-term current use of insulin (H)        Dose:  10 mg   Take 1 tablet (10 mg) by mouth daily   Quantity:  90 tablet   Refills:  3       insulin degludec 200 UNIT/ML pen   Commonly known as:  TRESIBA   This may have changed:    - how much to take  - how to take this  - when to take this  - additional instructions   Used for:  Type 2 diabetes mellitus with  hyperglycemia, with long-term current use of insulin (H)        Take 120 units daily.   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 (H)        Dose:  30 g   Take 45 mLs (30 g) by mouth 4 times daily   Quantity:  7200 mL   Refills:  11       omeprazole 40 MG capsule   Commonly known as:  priLOSEC   This may have changed:  when to take this   Used for:  Gastroesophageal reflux disease, esophagitis presence not specified        Dose:  40 mg   Take 1 capsule (40 mg) by mouth daily   Quantity:  90 capsule   Refills:  2       potassium chloride SA 20 MEQ CR tablet   Commonly known as:  K-DUR/KLOR-CON M   This may have changed:  when to take this   Used for:  Hypokalemia        Dose:  20 mEq   Take 1 tablet (20 mEq) by mouth daily   Quantity:  90 tablet   Refills:  3       pravastatin 10 MG tablet   Commonly known as:  PRAVACHOL   This may have changed:    - how much to take  - when to take this   Used for:  Hyperlipidemia LDL goal <100        Dose:  20 mg   Take 2 tablets (20 mg) by mouth daily   Quantity:  90 tablet   Refills:  3       spironolactone 25 MG tablet   Commonly known as:  ALDACTONE   This may have changed:  when to take this   Used for:  Other ascites        Dose:  25 mg   Take 1 tablet (25 mg) by mouth daily   Quantity:  90 tablet   Refills:  1         CONTINUE these medicines which have NOT CHANGED        Dose / Directions    blood glucose monitoring lancets   Used for:  Type II diabetes mellitus (H)        Use to test blood sugar 4 times daily or as directed.  1 box = 102 lancets   Quantity:  408 each   Refills:  3       blood glucose monitoring test strip   Commonly known as:  ACCU-CHEK EDINSON PLUS   Used for:  Type II diabetes mellitus (H)        Use to test blood sugar 4 times daily   Quantity:  400 each   Refills:  3        carvedilol 12.5 MG tablet   Commonly known as:  COREG   Used for:  Liver cirrhosis secondary to ESTRADA (H), Secondary esophageal varices without bleeding (H)        Dose:  12.5 mg   Take 1 tablet (12.5 mg) by mouth 2 times daily (with meals)   Quantity:  180 tablet   Refills:  3       insulin aspart 100 UNIT/ML injection   Commonly known as:  NovoLOG FLEXPEN   Used for:  Type II diabetes mellitus (H)        1 unit per 4 Gms CHO at meals and snacks + correction scale of 1 unit per 25 mg/dL over 125. Average daily dose is 75 units.   Quantity:  24 mL   Refills:  11       insulin pen needle 32G X 4 MM   Commonly known as:  BD VIKTORIA U/F   Used for:  Type II diabetes mellitus (H)        Use as directed.   Quantity:  100 each   Refills:  11       OLANZapine 2.5 MG tablet   Commonly known as:  zyPREXA   Used for:  Insomnia, unspecified type        Dose:  2.5 mg   Take 1 tablet (2.5 mg) by mouth At Bedtime   Quantity:  30 tablet   Refills:  5       RA VITAMIN B-12 TR 1000 MCG Tbcr   Generic drug:  cyanocobalamin        Dose:  1000 mcg   Take 1,000 mcg by mouth every morning   Refills:  0       rifaximin 550 MG Tabs tablet   Commonly known as:  XIFAXAN   Used for:  Hepatic encephalopathy (H)        Dose:  550 mg   Take 1 tablet (550 mg) by mouth 2 times daily   Quantity:  60 tablet   Refills:  11       THERAVITE PO        Dose:  1 tablet   Take 1 tablet by mouth every morning   Refills:  0       VITAMIN D-3 PO        Dose:  2000 Units   Take 2,000 Units by mouth every morning   Refills:  0         STOP taking     aspirin 81 MG EC tablet   Commonly known as:  ASPIRIN LOW DOSE                Where to get your medicines      These medications were sent to [facility] - [place], [place] - [address] [facility]  [address] [facility] [address]     Phone:  [phone]     artificial tears Oint ophthalmic ointment    hypromellose-dextran Soln ophthalmic solution         Some of these will need a paper  prescription and others can be bought over the counter. Ask your nurse if you have questions.     Bring a paper prescription for each of these medications     lactobacillus rhamnosus (GG) capsule    oxyCODONE IR 5 MG tablet       You don't need a prescription for these medications     ertapenem 1 GM vial               ANTIBIOTIC INSTRUCTION     You've Been Prescribed an Antibiotic - Now What?  Your healthcare team thinks that you or your loved one might have an infection. Some infections can be treated with antibiotics, which are powerful, life-saving drugs. Like all medications, antibiotics have side effects and should only be used when necessary. There are some important things you should know about your antibiotic treatment.      Your healthcare team may run tests before you start taking an antibiotic.    Your team may take samples (e.g., from your blood, urine or other areas) to run tests to look for bacteria. These test can be important to determine if you need an antibiotic at all and, if you do, which antibiotic will work best.      Within a few days, your healthcare team might change or even stop your antibiotic.    Your team may start you on an antibiotic while they are working to find out what is making you sick.    Your team might change your antibiotic because test results show that a different antibiotic would be better to treat your infection.    In some cases, once your team has more information, they learn that you do not need an antibiotic at all. They may find out that you don't have an infection, or that the antibiotic you're taking won't work against your infection. For example, an infection caused by a virus can't be treated with antibiotics. Staying on an antibiotic when you don't need it is more likely to be harmful than helpful.      You may experience side effects from your antibiotic.    Like all medications, antibiotics have side effects. Some of these can be serious.    Let you healthcare  team know if you have any known allergies when you are admitted to the hospital.    One significant side effect of nearly all antibiotics is the risk of severe and sometimes deadly diarrhea caused by Clostridium difficile (C. Difficile). This occurs when a person takes antibiotics because some good germs are destroyed. Antibiotic use allows C. diificile to take over, putting patients at high risk for this serious infection.    As a patient or caregiver, it is important to understand your or your loved one's antibiotic treatment. It is especially important for caregivers to speak up when patients can't speak for themselves. Here are some important questions to ask your healthcare team.    What infection is this antibiotic treating and how do you know I have that infection?    What side effects might occur from this antibiotic?    How long will I need to take this antibiotic?    Is it safe to take this antibiotic with other medications or supplements (e.g., vitamins) that I am taking?     Are there any special directions I need to know about taking this antibiotic? For example, should I take it with food?    How will I be monitored to know whether my infection is responding to the antibiotic?    What tests may help to make sure the right antibiotic is prescribed for me?      Information provided by:  www.cdc.gov/getsmart  U.S. Department of Health and Human Services  Centers for disease Control and Prevention  National Center for Emerging and Zoonotic Infectious Diseases  Division of Healthcare Quality Promotion         Protect others around you: Learn how to safely use, store and throw away your medicines at www.disposemymeds.org.             Medication List: This is a list of all your medications and when to take them. Check marks below indicate your daily home schedule. Keep this list as a reference.      Medications           Morning Afternoon Evening Bedtime As Needed    artificial tears Oint ophthalmic ointment    Place 1 g into the right eye At Bedtime   Last time this was given:  11/5/2017  9:47 PM                                blood glucose monitoring lancets   Use to test blood sugar 4 times daily or as directed.  1 box = 102 lancets                                blood glucose monitoring test strip   Commonly known as:  ACCU-CHEK EDINSON PLUS   Use to test blood sugar 4 times daily                                carvedilol 12.5 MG tablet   Commonly known as:  COREG   Take 1 tablet (12.5 mg) by mouth 2 times daily (with meals)   Last time this was given:  12.5 mg on 11/6/2017  7:44 AM                                dapagliflozin 10 MG Tabs tablet   Commonly known as:  FARXIGA   Take 1 tablet (10 mg) by mouth daily                                ertapenem 1 GM vial   Commonly known as:  INVanz   Inject 1 g into the vein every 24 hours for 14 days   Last time this was given:  1 g on 11/5/2017  5:33 PM                                hypromellose-dextran Soln ophthalmic solution   Place 1 drop into the right eye every hour as needed for dry eyes Apply at least 4 times daily and as needed for dry eye   Last time this was given:  2 drops on 11/6/2017 12:08 PM                                insulin aspart 100 UNIT/ML injection   Commonly known as:  NovoLOG FLEXPEN   1 unit per 4 Gms CHO at meals and snacks + correction scale of 1 unit per 25 mg/dL over 125. Average daily dose is 75 units.   Last time this was given:  10 Units on 11/6/2017 12:09 PM                                insulin degludec 200 UNIT/ML pen   Commonly known as:  TRESIBA   Take 120 units daily.   Last time this was given:  90 Units on 11/6/2017  7:45 AM                                insulin pen needle 32G X 4 MM   Commonly known as:  BD VIKTORIA U/F   Use as directed.                                lactobacillus rhamnosus (GG) capsule   Take 1 capsule by mouth 2 times daily for 14 days   Last time this was given:  1 capsule on 11/6/2017  7:44 AM                                 lactulose 10 GM/15ML solution   Commonly known as:  CHRONULAC   Take 45 mLs (30 g) by mouth 4 times daily   Last time this was given:  30 g on 11/6/2017 12:11 PM                                OLANZapine 2.5 MG tablet   Commonly known as:  zyPREXA   Take 1 tablet (2.5 mg) by mouth At Bedtime   Last time this was given:  2.5 mg on 11/5/2017  9:42 PM                                omeprazole 40 MG capsule   Commonly known as:  priLOSEC   Take 1 capsule (40 mg) by mouth daily   Last time this was given:  40 mg on 11/6/2017  7:44 AM                                oxyCODONE IR 5 MG tablet   Commonly known as:  ROXICODONE   Take 1 tablet (5 mg) by mouth every 3 hours as needed for moderate to severe pain   Last time this was given:  5 mg on 11/4/2017 12:31 AM                                potassium chloride SA 20 MEQ CR tablet   Commonly known as:  K-DUR/KLOR-CON M   Take 1 tablet (20 mEq) by mouth daily   Last time this was given:  20 mEq on 11/6/2017  7:44 AM                                pravastatin 10 MG tablet   Commonly known as:  PRAVACHOL   Take 2 tablets (20 mg) by mouth daily   Last time this was given:  20 mg on 11/6/2017  7:44 AM                                RA VITAMIN B-12 TR 1000 MCG Tbcr   Take 1,000 mcg by mouth every morning   Generic drug:  cyanocobalamin                                rifaximin 550 MG Tabs tablet   Commonly known as:  XIFAXAN   Take 1 tablet (550 mg) by mouth 2 times daily   Last time this was given:  550 mg on 11/6/2017  7:45 AM                                spironolactone 25 MG tablet   Commonly known as:  ALDACTONE   Take 1 tablet (25 mg) by mouth daily   Last time this was given:  25 mg on 11/6/2017  7:44 AM                                THERAVITE PO   Take 1 tablet by mouth every morning                                VITAMIN D-3 PO   Take 2,000 Units by mouth every morning

## 2017-11-02 NOTE — ANESTHESIA PREPROCEDURE EVALUATION
Anesthesia Evaluation     . Pt has had prior anesthetic. Type: General and MAC           ROS/MED HX    ENT/Pulmonary:  - neg pulmonary ROS   (+)other ENT- PArotid Tumor, , . .    Neurologic:  - neg neurologic ROS     Cardiovascular:  - neg cardiovascular ROS       METS/Exercise Tolerance:  4 - Raking leaves, gardening   Hematologic:  - neg hematologic  ROS       Musculoskeletal:  - neg musculoskeletal ROS       GI/Hepatic:     (+) GERD hepatitis type A,       Renal/Genitourinary:  - ROS Renal section negative       Endo:     (+) type I DM, .      Psychiatric:  - neg psychiatric ROS       Infectious Disease:  - neg infectious disease ROS       Malignancy:      - no malignancy   Other:    - neg other ROS                 Physical Exam  Normal systems: pulmonary and dental    Airway   Mallampati: II    Dental     Cardiovascular   Rhythm and rate: regular and normal      Pulmonary                     Anesthesia Plan      History & Physical Review  History and physical reviewed and following examination; no interval change.    ASA Status:  3 .    NPO Status:  > 8 hours    Plan for General and ETT with Intravenous and Propofol induction. Maintenance will be Balanced.    PONV prophylaxis:  Ondansetron (or other 5HT-3)  Additional equipment: Videolaryngoscope      Postoperative Care  Postoperative pain management:  IV analgesics.      Consents  Anesthetic plan, risks, benefits and alternatives discussed with:  Patient..                          .

## 2017-11-02 NOTE — OR NURSING
Pt declines to have Mohamud Workman, Sahil Coulter, Kelsi Workman contact or visit or receive information. His daughter Negra Workman is welcome to visit and so is his dad Michaelkarolyn Hernández Ji.

## 2017-11-02 NOTE — OR NURSING
Dr. CARYL Pratt here at bedside to talk to Pt about his surgery. Pt repeatedly asking questions.  Pt c/o of upper teeth pain. Dr. Pratt aware and is not expecting this type of pain.  Pt has right facial droop and asymmetry which MD expects due to nerve being in the operative area.  Will continue to monitor.

## 2017-11-02 NOTE — OR NURSING
Dr. Pratt at bedside MD aware of pt's elevated temp okay to continue with platelet infusion. MD to d/c tylenol order due to pt's liver history, pt states only ibuprofen works for pain control. Pt refused to take opioids.

## 2017-11-02 NOTE — ANESTHESIA POSTPROCEDURE EVALUATION
Patient: Frandy Workman    Procedure(s):  Right Superfacial Parotidectomy , Facial nerve repair. with NIM facial nerve monitor. - Wound Class: I-Clean    Diagnosis:Parotid Mass   Diagnosis Additional Information: No value filed.    Anesthesia Type:  General, ETT    Note:  Anesthesia Post Evaluation    Patient location during evaluation: PACU  Patient participation: Able to fully participate in evaluation  Level of consciousness: awake and alert  Pain management: adequate  Airway patency: patent  Cardiovascular status: acceptable  Respiratory status: acceptable  Hydration status: acceptable  PONV: none     Anesthetic complications: None          Last vitals:  Vitals:    11/02/17 1715 11/02/17 1730 11/02/17 1745   BP: 150/65 135/64 126/70   Resp: 22 20 20   Temp:      SpO2: 100% 100% 100%         Electronically Signed By: Michael Huerta MD  November 2, 2017  5:52 PM

## 2017-11-02 NOTE — BRIEF OP NOTE
[facility], [place]    Brief Operative Note    Pre-operative diagnosis: Parotid Mass   Post-operative diagnosis * No post-op diagnosis entered *  Procedure: Procedure(s):  Right Superfacial Parotidectomy , Facial nerve repair. with NIM facial nerve monitor. - Wound Class: I-Clean  Surgeon: Surgeon(s) and Role:     * Asiya Morgan MD - Primary     * Nicolas Pratt MD - Assisting  Anesthesia: General   Estimated blood loss: 100 mL  Drains: Duong-Esposito  Specimens:   ID Type Source Tests Collected by Time Destination   1 : Right Parotid Mass Tissue Other ANAEROBIC BACTERIAL CULTURE, FUNGUS CULTURE, GRAM STAIN, TISSUE CULTURE AEROBIC BACTERIAL Asiya Morgan MD 11/2/2017 12:25 PM    A : Tissue Of Skin Flap Over Parotid Mass Tissue Head SURGICAL PATHOLOGY EXAM Asiya Morgan MD 11/2/2017  9:25 AM    B : Right Parotid Mass Tissue Other SURGICAL PATHOLOGY EXAM Asiya Morgan MD 11/2/2017 12:25 PM    C : Right Parotid Mass fluid Tissue Other CYTOLOGY NON GYN Asiya Morgan MD 11/2/2017  1:07 PM    D : Tissue over Temporalis Tissue Other SURGICAL PATHOLOGY EXAM Asiya Morgan MD 11/2/2017  1:17 PM    E : biopsy of parotid mass Tissue Other SURGICAL PATHOLOGY EXAM Asiya Morgan MD 11/2/2017  1:50 PM    F : right parotidectomy  Tissue Other SURGICAL PATHOLOGY EXAM Asiya Morgan MD 11/2/2017  2:18 PM      Findings:   see operative note.  Complications: None.  Implants: None.

## 2017-11-02 NOTE — OP NOTE
Date of Procedure: 11/2/2017    Attending Physician: Asiya Morgan MD    Resident Physicians: Nicolas Pratt MD    Procedure Performed:  Right parotidectomy with facial nerve dissection  Neurorrhaphy of zygomatic branch of facial nerve   Continuous facial nerve monitoring    Preoperative Diagnosis: Parotid mass     Postoperative Diagnosis: same    Anesthesia: General    Blood loss: 100 cc     Specimens:   ID Type Source Tests Collected by Time Destination   1 : Right Parotid Mass Tissue Other ANAEROBIC BACTERIAL CULTURE, FUNGUS CULTURE, GRAM STAIN, TISSUE CULTURE AEROBIC BACTERIAL Asiya Morgan MD 11/2/2017 12:25 PM     A : Tissue Of Skin Flap Over Parotid Mass Tissue Head SURGICAL PATHOLOGY EXAM Asiya Morgan MD 11/2/2017  9:25 AM     B : Right Parotid Mass Tissue Other SURGICAL PATHOLOGY EXAM Asiya Morgan MD 11/2/2017 12:25 PM     C : Right Parotid Mass fluid Tissue Other CYTOLOGY NON GYN Asiya Morgan MD 11/2/2017  1:07 PM     D : Tissue over Temporalis Tissue Other SURGICAL PATHOLOGY EXAM Asiya Morgan MD 11/2/2017  1:17 PM     E : biopsy of parotid mass Tissue Other SURGICAL PATHOLOGY EXAM Asiya Morgan MD 11/2/2017  1:50 PM     F : right parotidectomy  Tissue Other SURGICAL PATHOLOGY EXAM Asiya Morgan MD 11/2/2017  2:18 PM            Implants:  LUANNE drain x 1    Complications: None    Findings:  There was an approximately 4 cm mass in the right parotid just below the skin. The mass encased the superior division of the facial nerve and extended deep to it. Frozen section specimens consistent with granulation tissue with purulence within the mass itself was most consistent with an abscess rather than a tumor. The superficial lobe of the parotid was resected in continuity with the majority of the mass however the deep portion underneath the facial nerve was left in place as attempts at further dissection placed the superior division of the facial nerve at risk. The  facial nerve was intact at the end of the case with the exception of one branch of the zygomatic nerve which was repaired with a primary neurorrhaphy near the orbital rim. The lower division demonstrated good movement at 0.8 mA. The upper division distally was able to be stimulated but was praxed along its proximal portion.    Indications:  Frandy Workman is a 52-year-old gentleman with a history of a right parotid mass that has been present for approximately 3 months. He had a CT scan that showed a mass of the right parotid region. An FNA showed inflammatory cells without malignancy. He was placed on antibiotics and then underwent a repeat FNA. The mass had increased in size and again the FNA was nondiagnostic. His case was reviewed at tumor board and given his high risk for malignancy based on the imaging and presentation, te decision was made for surgical resection of the mass with a superficial parotidectomy.    Description of Procedure:  After informed consent was obtained, the patient was brought back to the main operating room and placed in the supine position. General anesthesia was induced and the patient was orotracheally intubated. The bed was turned 180  from anesthesia. A Carroll was placed. Facial nerve monitors were placed and tested. The planned incision was marked in the preauricular crease extending down into the neck in modified Uriel fashion. This was injected with 1:100,000 epinephrine. The patient was prepped and draped in sterile fashion. A timeout was performed.     A 15 blade was used to make an incision through the skin. This was extended inferiorly down to the level of the SCM. The external jugular vein was preserved. The greater auricular nerve was divided. The skin flap was raised off the SCM towards the mandible. Superiorly a skin flap was raised off the parotid. Of note the mass was in the subcutaneous plane and the skin flap was raised in a subcutaneous plane directly overlying the  mass. Inferiorly the skin flap was raised in a a pre-parotid fascia plane. The SCM was traced posteriorly towards the mastoid tip, releasing the anterior border of the muscle. From here the posterior belly of the digastric was identified and similarly traced towards the mastoid tip. A pretragal plane was defined using blunt dissection. Using blunt dissection the main trunk of the facial nerve was identified using the Wall dissector just superior to the digastric. This was traced toward the pes, taking care to divide the overlying parotid gland. There was bleeding as the parotid was divided that was account encountered secondary to the patient's thrombocytopenia, making it more complicated to visualize the main trunk of the facial nerve.     Given concerns for potential injury to the nerve because of bleeding, we instead turned our attention to retrograde dissection of the facial nerve. The marginal mandibular branch was identified distally using the Prass probe. This was traced in retrograde fashion using the Wall dissector back towards the main trunk, dividing the overlying parotid gland. The parotid was released off of the retromandibular vein which was left intact. The marginal mandibular nerve was used to trace back and identify the inferior division of the facial nerve and then carried back towards the pes. We then continued to anterograde trace the facial nerve through the parotid taking care to divide the overlying tissue as we traced out the buccal branch. There were multiple branches of the buccal nerve that were traced out distally and the superficial lobe of the parotid along with the mass was released from this. The dissection was extended out toward the masseter.     We then moved on to the superior division of the facial nerve. The mass was directly overlying the superior division as the frontal and zygomatic branches divided. There was a branch of the zygomatic nerve that was able to be dissected  off the overlying mass. As the superior division was dissected from the overlying mass, the capsule of the mass was violated with the Wall dissector. There was immediate expression of necrotic fluid versus purulence from the mass. Culture swabs were sent of the fluid and an aspirate was performed and sent for cytology. The cytology was consistent with inflammation and no obvious malignancy. A stitch was placed in the area where the capsule was violated to prevent further expression of fluid. In this region the mass was densely adherent to the facial nerve. Extensive time was spent trying to dissect out the zygomatic and frontal branches from the mass. The frontal branch went directly into the area of the mass.     We then tried to release the posterior border of the specimen, just anterior to the tragal cartilage. The superficial temporal artery and vein were both divided. We then continued to release the mass working from posterior to anterior. The mass appeared to extend superiorly toward the temporalis muscle. A plane of dissection was identified along the temporalis fascia. The distal branches of the frontal nerve were identified and traced back towards the mass. There was extremely friable tissue in this region which made dissection of the nerve challenging; the nerve extended back toward the mass along its distal segment. At this point as we were trying to dissect the mass off of the superior division, the capsule of the mass was again violated. A small piece of the capsule was sent for frozen section which was consistent with granulation tissue and inflammation. Similarly a margin was sent from posteriorly near the superficial temporal vessels which similarly showed granulation tissue.     Based on this information from the frozen sections and cytology, it seemed that the patient most likely had a long-standing abscess rather than an actual tumor. Given this information, it did not seem prudent to try to  sacrifice the facial nerve as it was working normally prior to the surgery. We spent extensive time trying to dissect out the frontal and zygomatic branches from within the mass itself. As we dissected through the capsule of the mass, the frontal and zygomatic branches were able to be freed up circumferentially. Ultimately the mass with the superficial lobe of the parotid was able to be completely removed. The specimen was taken to pathology and the capsule was sampled for frozen section. Again this just showed inflammation and granulation tissue. A piece of the capsule was also sent for tissue culture. There was remnant of the abscess capsule deep to the superior division of the facial nerve and extending under the zygoma which would not be removed without sacrificing the facial nerve. Given that there was no evidence of malignancy and the majority of the abscess had been removed, the decision was made to leave this in place.     We then reinspected the wound which showed that the facial nerve appeared to be intact in the entire lower division and was able to be stimulated at 0.8 mA on the Prass probe with movement noted on the monitor and observed on the face. The superior division on stimulation at the main trunk would did not demonstrate any movement but more distally there was movement observed and stimulation on the monitor at 0.8 mA. On inspection of the superior division, the nerve appeared to be completely intact without any injuries. The nerve did have some swelling to it likely consistent with extensive dissection as well as the fact that they had been completely encased in the abscess. Additionally a distal branch of the zygomatic nerve appeared to no longer be intact near the orbital rim. The distal and proximal ends of the zygomatic branch was anastomosed using an interrupted 9-0 nylon. Of note this was quite distal in the face almost at the level of the lateral canthus, and the patient had other  larger branches of the zygomatic nerve that were able to be stimulated and demonstrated movement in the orbicularis oculi.     The wound was thoroughly irrigated with first sterile water and then antibiotic solution. Multiple sustained valsalva maneuvers were performed and hemostasis was achieved with the bipolar cautery. Given the patient's thrombocytopenia, the decision was made to place Surgicel over the facial nerve and the remnant parotid. A 10 mm flat LUANNE drain was placed within the wound and secured with a 3-0 nylon. The incision was closed with a buried interrupted 3-0 Vicryl. The skin was then closed with a running 5-0 Prolene. Bacitracin was applied to this incision. A pressure dressing was placed. The patient was turned back to anesthesia for extubation. The patient was taken to the PACU in stable condition.    I was present for and participated in the entire procedure.      A 22 modifier should be billed on the parotidectomy due to the complexity of the mass causing prolongation and increased difficulty of the dissection as well as the patient's thrombocytopenia causing increased bleeding and resulting in a longer time for surgical resection. The procedure took 6 hours, nearly twice as much time as normal.       Asiya Morgan MD    Department of Otolaryngology

## 2017-11-02 NOTE — ANESTHESIA CARE TRANSFER NOTE
Patient: Frandy Workman    Procedure(s):  Right Superfacial Parotidectomy , Facial nerve repair. with NIM facial nerve monitor. - Wound Class: I-Clean    Diagnosis: Parotid Mass   Diagnosis Additional Information: No value filed.    Anesthesia Type:   General, ETT     Note:  Airway :Face Mask  Patient transferred to:PACU  Comments: VSS.  Report given to RN.Handoff Report: Identifed the Patient, Identified the Reponsible Provider, Reviewed the pertinent medical history, Discussed the surgical course, Reviewed Intra-OP anesthesia mangement and issues during anesthesia, Set expectations for post-procedure period and Allowed opportunity for questions and acknowledgement of understanding      Vitals: (Last set prior to Anesthesia Care Transfer)    CRNA VITALS  11/2/2017 1533 - 11/2/2017 1612      11/2/2017             Resp Rate (observed): (!)  2                Electronically Signed By: SHANIQUE Espitia CRNA  November 2, 2017  4:12 PM

## 2017-11-02 NOTE — OR NURSING
I called both numbers that the pt gave for his ride with Isaias Torres, but there was no answer. I left a message for Isaias to call [facility]--[phone]

## 2017-11-02 NOTE — IP AVS SNAPSHOT
[address] [address]    Phone:  [phone]                                       After Visit Summary   11/2/2017    Frandy Workman    MRN: [id number]           After Visit Summary Signature Page     I have received my discharge instructions, and my questions have been answered. I have discussed any challenges I see with this plan with the nurse or doctor.    ..........................................................................................................................................  Patient/Patient Representative Signature      ..........................................................................................................................................  Patient Representative Print Name and Relationship to Patient    ..................................................               ................................................  Date                                            Time    ..........................................................................................................................................  Reviewed by Signature/Title    ...................................................              ..............................................  Date                                                            Time

## 2017-11-02 NOTE — OR NURSING
"Pt did not take his Coreg for over a week. Dr. Dallas was informed. CBC and platelets ordered per Dr. Rahman's telephone order since platelets were low on 10/18. Pt stated he had a blood transfusion in September, 2017, so T & S carry- over form was used, but blood bank stated that pt was transfused after the T & S on 10/18, so repeat T & S is being done. Pt states he is \"anti-medication\" and wants to give his \"okay\" for any analgesics in surgery. He tells Dr. Dallas that he will raise his hand if he wants the drugs while he is under general anesthesia.  Pt wants his glasses with him through the OR and recovery. Dr. Campbellja aware that I was unable to reach the pt's ride this a.m. And states she will \"deal with it after surgery. Pt may need to be admitted anyway because of his liver function\", she states. Pt has not taken his diuretic also in over a week. He takes his meds at \"his discretion\", so edema noted to his ankles and legs. He is currently in assisted living at \"[facility]\" and will have supervised care there, although he manages his own medications. Noted large mass to the right side of his face that is tender to touch and painful all the time. He has some difficulty swallowing, tinnitus in right ear, and headaches from the mass. He also is in the beginning stages of macular degeneration, he tells me.  "

## 2017-11-03 ENCOUNTER — APPOINTMENT (OUTPATIENT)
Dept: GENERAL RADIOLOGY | Facility: CLINIC | Age: 52
DRG: 134 | End: 2017-11-03
Attending: OTOLARYNGOLOGY
Payer: MEDICARE

## 2017-11-03 PROBLEM — K11.3 PAROTID ABSCESS: Status: ACTIVE | Noted: 2017-11-03

## 2017-11-03 LAB
ALBUMIN SERPL-MCNC: 2.7 G/DL (ref 3.4–5)
ALP SERPL-CCNC: 70 U/L (ref 40–150)
ALT SERPL W P-5'-P-CCNC: 34 U/L (ref 0–70)
ANION GAP SERPL CALCULATED.3IONS-SCNC: 6 MMOL/L (ref 3–14)
AST SERPL W P-5'-P-CCNC: 44 U/L (ref 0–45)
BASOPHILS # BLD AUTO: 0 10E9/L (ref 0–0.2)
BASOPHILS NFR BLD AUTO: 0.2 %
BILIRUB DIRECT SERPL-MCNC: 0.5 MG/DL (ref 0–0.2)
BILIRUB SERPL-MCNC: 1.8 MG/DL (ref 0.2–1.3)
BUN SERPL-MCNC: 19 MG/DL (ref 7–30)
C DIFF TOX B STL QL: NEGATIVE
CALCIUM SERPL-MCNC: 8.7 MG/DL (ref 8.5–10.1)
CHLORIDE SERPL-SCNC: 113 MMOL/L (ref 94–109)
CO2 SERPL-SCNC: 24 MMOL/L (ref 20–32)
COPATH REPORT: NORMAL
CREAT SERPL-MCNC: 1.06 MG/DL (ref 0.66–1.25)
DIFFERENTIAL METHOD BLD: ABNORMAL
EOSINOPHIL # BLD AUTO: 0.1 10E9/L (ref 0–0.7)
EOSINOPHIL NFR BLD AUTO: 1.2 %
ERYTHROCYTE [DISTWIDTH] IN BLOOD BY AUTOMATED COUNT: 14.4 % (ref 10–15)
GFR SERPL CREATININE-BSD FRML MDRD: 73 ML/MIN/1.7M2
GLUCOSE BLDC GLUCOMTR-MCNC: 159 MG/DL (ref 70–99)
GLUCOSE BLDC GLUCOMTR-MCNC: 159 MG/DL (ref 70–99)
GLUCOSE BLDC GLUCOMTR-MCNC: 202 MG/DL (ref 70–99)
GLUCOSE BLDC GLUCOMTR-MCNC: 243 MG/DL (ref 70–99)
GLUCOSE BLDC GLUCOMTR-MCNC: 263 MG/DL (ref 70–99)
GLUCOSE BLDC GLUCOMTR-MCNC: 289 MG/DL (ref 70–99)
GLUCOSE SERPL-MCNC: 294 MG/DL (ref 70–99)
HCT VFR BLD AUTO: 29.9 % (ref 40–53)
HGB BLD-MCNC: 9.6 G/DL (ref 13.3–17.7)
IMM GRANULOCYTES # BLD: 0 10E9/L (ref 0–0.4)
IMM GRANULOCYTES NFR BLD: 0.2 %
LYMPHOCYTES # BLD AUTO: 0.7 10E9/L (ref 0.8–5.3)
LYMPHOCYTES NFR BLD AUTO: 15.7 %
MCH RBC QN AUTO: 34.2 PG (ref 26.5–33)
MCHC RBC AUTO-ENTMCNC: 32.1 G/DL (ref 31.5–36.5)
MCV RBC AUTO: 106 FL (ref 78–100)
MONOCYTES # BLD AUTO: 0.4 10E9/L (ref 0–1.3)
MONOCYTES NFR BLD AUTO: 10.4 %
NEUTROPHILS # BLD AUTO: 3 10E9/L (ref 1.6–8.3)
NEUTROPHILS NFR BLD AUTO: 72.3 %
NRBC # BLD AUTO: 0 10*3/UL
NRBC BLD AUTO-RTO: 0 /100
PLATELET # BLD AUTO: 43 10E9/L (ref 150–450)
POTASSIUM SERPL-SCNC: 4.5 MMOL/L (ref 3.4–5.3)
PROT SERPL-MCNC: 6.2 G/DL (ref 6.8–8.8)
RBC # BLD AUTO: 2.81 10E12/L (ref 4.4–5.9)
SODIUM SERPL-SCNC: 143 MMOL/L (ref 133–144)
SPECIMEN SOURCE: NORMAL
WBC # BLD AUTO: 4.1 10E9/L (ref 4–11)

## 2017-11-03 PROCEDURE — 73130 X-RAY EXAM OF HAND: CPT | Mod: RT

## 2017-11-03 PROCEDURE — 80076 HEPATIC FUNCTION PANEL: CPT | Performed by: STUDENT IN AN ORGANIZED HEALTH CARE EDUCATION/TRAINING PROGRAM

## 2017-11-03 PROCEDURE — 25000132 ZZH RX MED GY IP 250 OP 250 PS 637: Mod: GY | Performed by: STUDENT IN AN ORGANIZED HEALTH CARE EDUCATION/TRAINING PROGRAM

## 2017-11-03 PROCEDURE — 25000132 ZZH RX MED GY IP 250 OP 250 PS 637: Mod: GY | Performed by: PHYSICIAN ASSISTANT

## 2017-11-03 PROCEDURE — 36415 COLL VENOUS BLD VENIPUNCTURE: CPT | Performed by: STUDENT IN AN ORGANIZED HEALTH CARE EDUCATION/TRAINING PROGRAM

## 2017-11-03 PROCEDURE — 25000125 ZZHC RX 250: Performed by: STUDENT IN AN ORGANIZED HEALTH CARE EDUCATION/TRAINING PROGRAM

## 2017-11-03 PROCEDURE — 85025 COMPLETE CBC W/AUTO DIFF WBC: CPT | Performed by: STUDENT IN AN ORGANIZED HEALTH CARE EDUCATION/TRAINING PROGRAM

## 2017-11-03 PROCEDURE — 80048 BASIC METABOLIC PNL TOTAL CA: CPT | Performed by: STUDENT IN AN ORGANIZED HEALTH CARE EDUCATION/TRAINING PROGRAM

## 2017-11-03 PROCEDURE — 25000131 ZZH RX MED GY IP 250 OP 636 PS 637: Mod: GY

## 2017-11-03 PROCEDURE — 25000128 H RX IP 250 OP 636: Performed by: STUDENT IN AN ORGANIZED HEALTH CARE EDUCATION/TRAINING PROGRAM

## 2017-11-03 PROCEDURE — 87493 C DIFF AMPLIFIED PROBE: CPT | Performed by: PHYSICIAN ASSISTANT

## 2017-11-03 PROCEDURE — 73110 X-RAY EXAM OF WRIST: CPT | Mod: RT

## 2017-11-03 PROCEDURE — 99223 1ST HOSP IP/OBS HIGH 75: CPT | Performed by: HOSPITALIST

## 2017-11-03 PROCEDURE — 12000001 ZZH R&B MED SURG/OB UMMC

## 2017-11-03 PROCEDURE — A9270 NON-COVERED ITEM OR SERVICE: HCPCS | Mod: GY | Performed by: PHYSICIAN ASSISTANT

## 2017-11-03 PROCEDURE — 00000146 ZZHCL STATISTIC GLUCOSE BY METER IP

## 2017-11-03 PROCEDURE — A9270 NON-COVERED ITEM OR SERVICE: HCPCS | Mod: GY | Performed by: STUDENT IN AN ORGANIZED HEALTH CARE EDUCATION/TRAINING PROGRAM

## 2017-11-03 PROCEDURE — 99207 ZZC APP CREDIT; MD BILLING SHARED VISIT: CPT | Performed by: PHYSICIAN ASSISTANT

## 2017-11-03 RX ORDER — LACTULOSE 10 G/15ML
100 SOLUTION ORAL
Status: DISCONTINUED | OUTPATIENT
Start: 2017-11-03 | End: 2017-11-06 | Stop reason: HOSPADM

## 2017-11-03 RX ORDER — LACTULOSE 10 G/15ML
30 SOLUTION ORAL
Status: DISCONTINUED | OUTPATIENT
Start: 2017-11-03 | End: 2017-11-03

## 2017-11-03 RX ORDER — GINSENG 100 MG
CAPSULE ORAL 3 TIMES DAILY
Status: COMPLETED | OUTPATIENT
Start: 2017-11-03 | End: 2017-11-03

## 2017-11-03 RX ORDER — LACTULOSE 10 G/15ML
20 SOLUTION ORAL
Status: DISCONTINUED | OUTPATIENT
Start: 2017-11-03 | End: 2017-11-06 | Stop reason: HOSPADM

## 2017-11-03 RX ORDER — DAPAGLIFLOZIN 10 MG/1
10 TABLET, FILM COATED ORAL DAILY
Status: DISCONTINUED | OUTPATIENT
Start: 2017-11-04 | End: 2017-11-03

## 2017-11-03 RX ORDER — MINERAL OIL/HYDROPHIL PETROLAT
OINTMENT (GRAM) TOPICAL 3 TIMES DAILY
Status: DISCONTINUED | OUTPATIENT
Start: 2017-11-04 | End: 2017-11-06 | Stop reason: HOSPADM

## 2017-11-03 RX ORDER — NICOTINE POLACRILEX 4 MG
15-30 LOZENGE BUCCAL
Status: DISCONTINUED | OUTPATIENT
Start: 2017-11-03 | End: 2017-11-06 | Stop reason: HOSPADM

## 2017-11-03 RX ORDER — DEXTROSE MONOHYDRATE 25 G/50ML
25-50 INJECTION, SOLUTION INTRAVENOUS
Status: DISCONTINUED | OUTPATIENT
Start: 2017-11-03 | End: 2017-11-06 | Stop reason: HOSPADM

## 2017-11-03 RX ADMIN — LACTULOSE 30 G: 20 SOLUTION ORAL at 07:44

## 2017-11-03 RX ADMIN — BACITRACIN: 500 OINTMENT TOPICAL at 10:30

## 2017-11-03 RX ADMIN — SPIRONOLACTONE 25 MG: 25 TABLET ORAL at 07:44

## 2017-11-03 RX ADMIN — MINERAL OIL AND WHITE PETROLATUM: 150; 830 OINTMENT OPHTHALMIC at 21:25

## 2017-11-03 RX ADMIN — VITAMIN D, TAB 1000IU (100/BT) 2000 UNITS: 25 TAB at 07:43

## 2017-11-03 RX ADMIN — DEXTRAN 70, AND HYPROMELLOSE 2910 2 DROP: 1; 3 SOLUTION/ DROPS OPHTHALMIC at 07:45

## 2017-11-03 RX ADMIN — DEXTRAN 70, AND HYPROMELLOSE 2910 2 DROP: 1; 3 SOLUTION/ DROPS OPHTHALMIC at 17:07

## 2017-11-03 RX ADMIN — AMPICILLIN SODIUM AND SULBACTAM SODIUM 3 G: 2; 1 INJECTION, POWDER, FOR SOLUTION INTRAMUSCULAR; INTRAVENOUS at 10:30

## 2017-11-03 RX ADMIN — CARVEDILOL 12.5 MG: 12.5 TABLET, FILM COATED ORAL at 07:44

## 2017-11-03 RX ADMIN — INSULIN DEGLUDEC INJECTION 50 UNITS: 200 INJECTION, SOLUTION SUBCUTANEOUS at 17:08

## 2017-11-03 RX ADMIN — AMPICILLIN SODIUM AND SULBACTAM SODIUM 3 G: 2; 1 INJECTION, POWDER, FOR SOLUTION INTRAMUSCULAR; INTRAVENOUS at 17:14

## 2017-11-03 RX ADMIN — OMEPRAZOLE 40 MG: 20 CAPSULE, DELAYED RELEASE ORAL at 07:43

## 2017-11-03 RX ADMIN — BACITRACIN: 500 OINTMENT TOPICAL at 17:07

## 2017-11-03 RX ADMIN — DEXTRAN 70, AND HYPROMELLOSE 2910 2 DROP: 1; 3 SOLUTION/ DROPS OPHTHALMIC at 11:29

## 2017-11-03 RX ADMIN — AMPICILLIN SODIUM AND SULBACTAM SODIUM 3 G: 2; 1 INJECTION, POWDER, FOR SOLUTION INTRAMUSCULAR; INTRAVENOUS at 05:33

## 2017-11-03 RX ADMIN — CYANOCOBALAMIN TAB 1000 MCG 1000 MCG: 1000 TAB at 07:44

## 2017-11-03 RX ADMIN — CARVEDILOL 12.5 MG: 12.5 TABLET, FILM COATED ORAL at 17:07

## 2017-11-03 RX ADMIN — AMPICILLIN SODIUM AND SULBACTAM SODIUM 3 G: 2; 1 INJECTION, POWDER, FOR SOLUTION INTRAMUSCULAR; INTRAVENOUS at 23:59

## 2017-11-03 RX ADMIN — RIFAXIMIN 550 MG: 550 TABLET ORAL at 21:24

## 2017-11-03 RX ADMIN — DEXTRAN 70, AND HYPROMELLOSE 2910 2 DROP: 1; 3 SOLUTION/ DROPS OPHTHALMIC at 21:25

## 2017-11-03 RX ADMIN — PRAVASTATIN SODIUM 20 MG: 20 TABLET ORAL at 07:43

## 2017-11-03 RX ADMIN — POTASSIUM CHLORIDE 20 MEQ: 750 TABLET, EXTENDED RELEASE ORAL at 07:44

## 2017-11-03 RX ADMIN — OLANZAPINE 2.5 MG: 2.5 TABLET, FILM COATED ORAL at 22:33

## 2017-11-03 RX ADMIN — BACITRACIN: 500 OINTMENT TOPICAL at 22:33

## 2017-11-03 RX ADMIN — RIFAXIMIN 550 MG: 550 TABLET ORAL at 07:43

## 2017-11-03 ASSESSMENT — VISUAL ACUITY
OU: NORMAL ACUITY
OU: GLASSES

## 2017-11-03 ASSESSMENT — PAIN DESCRIPTION - DESCRIPTORS: DESCRIPTORS: ACHING;DISCOMFORT
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
## 2017-11-03 NOTE — PROGRESS NOTES
S/p Right Superfacial Parotidectomy and Facial nerve repair. Incision is CDI w/ jaw bra. LUANNE drain with minimal output. Patient is up independent w/ ADL's. Continent of bowel and bladder with very loose stools. Sample sent to lab. Tolerating high CHO diet covered with regular insulin denies n/v. Reports some pain at incision site but refuses pain medication.

## 2017-11-03 NOTE — CONSULTS
[facility] ID SERVICE: NEW CONSULTATION   Frandy Workman : 1965 Sex: male:   Medical record number [id number]  Date of Admission: 2017  Consult Requester:Asiya Morgan MD  Date of Service: November 3, 2017      REASON FOR CONSULTATION:   Parotid Abscess    PROBLEM LIST:  #Right Sided Parotid Mass, likely abscess  #ESTRADA Cirrhosis  #T2DM  #Possible R ulnar neuropathy  #Diarrhea    RECOMMENDATIONS:   - Continue Ampicillin-Sulbactam 3g IV q6  - Follow-up final tissue culture final results    DISCUSSION:   Mr. Workman is a 51yo man who presented for superficial parotidectomy on  for a presumably malignancy parotid mass. On frozen section, there was granulation tissue with purulence most suggestive of an abscess. The entire superficial portion was resected but the deeper portion was left in place. On FNA from 10/11, cultures grew S. Salivarius. Operative cultures from  are now growing S. Anginosus. Both of these organisms are not unexpected given they are typical oral alvarado. Would continue Amp/Sulbactam IV pending final culture results. He will likely need prolonged treatment or potentially even IV therapy given he did not previously respond to 2 weeks of Augmentin.    Patient seen and discussed with Infectious Diseases Staff, Dr. Noel.  ID will continue to follow.    Wendy Romo D.O.  [facility] ID Fellow  [phone]    HPI: Mr. Workman is a 51yo man with a PMHx of ESTRADA cirrhosis, T2DM and right parotid mass who was admitted for superficial parotidectomy and right facial nerve repair on . Intraoperatively, the mass appeared to be an abscess with preliminary pathology negative. Patient endorses that the R sided facial mass appeared suddenly late August or early September. He had a CT done  which showed a solid right parotid mass with infiltrative margines concerning for malignancy. He was given Cephalexin and switched to Augmentin x 2 weeks on  for which he endorses no  improvement in the R parotid mass. He then underwent US guided FNA on 10/11 which had light growth of S. Salivarius, pathology with no evidence of malignancy just acute inflammation. Given ongoing concern for malignancy, he was scheduled for superficial parotidectomy when an intraoperative abscess was identified. Patient endorses having dentures fitted just prior to all of his symptoms starting, but denies any known trauma or other dental work. He has not been experiencing any fevers, just feels cold. Denies oral exudate. Infectious Diseases is being consulted for antibiotic recommendations.    ANTI-INFECTIVES:   Rifaximin 550mg for HE  Amoxicillin-Clavulanate 875/125 x2 weeks (9/29-10/13)  Cefazolin 2g x1 (11/2)  Ampicillin-Sulbactam (11/2- )    ROS:  A ten point review of systems was obtained and was negative with the exception of that which is described in the HPI.    PMH:   Past Medical History:   Diagnosis Date     Anemia 2013    Low blood plates current is 37     BPH (benign prostatic hyperplasia)      Cholelithiasis      Conductive hearing loss 8/16/2017    Have a lump on my right side of my face.  Had wax discharge     Depressive disorder 1986    Suffer effects throughout life     Gastroesophageal reflux disease 12/1/2014    Being treated with Prilosac     Hepatitis 2014    Diagnosed with schrosis ESTRADA in 2014.  Suffer from hepatatie     Hyperlipidemia      Liver cirrhosis secondary to ESTRADA (H)      Type II diabetes mellitus (H)      Past Surgical History:   Procedure Laterality Date     ESOPHAGOSCOPY, GASTROSCOPY, DUODENOSCOPY (EGD), COMBINED N/A 11/17/2016    Procedure: COMBINED ESOPHAGOSCOPY, GASTROSCOPY, DUODENOSCOPY (EGD);  Surgeon: Santi Rosas MD;  Location: [facility]     KNEE SURGERY Left      PAROTIDECTOMY, RADICAL NECK DISSECTION Right 11/2/2017    Procedure: PAROTIDECTOMY, RADICAL NECK DISSECTION;  Right Superfacial Parotidectomy , Facial nerve repair. with [facility] facial nerve monitor.;   "Surgeon: Asiya Morgan MD;  Location: [facility]       MEDICATIONS: Reviewed in chart. No antibiotic allergies.    SOCIAL HISTORY AND RISK FACTORS   Social History   Substance Use Topics     Smoking status: Former Smoker     Packs/day: 6.00     Years: 30.00     Types: Cigars     Start date: 2016     Quit date: 5/15/2013     Smokeless tobacco: Current User     Types: Chew      Comment: 1 tin per week     Alcohol use No      Comment: quit 1996     History   Sexual Activity     Sexual activity: Not Currently     Partners: Female     Birth control/ protection: Condom       FAMILY HISTORY:   Reviewed and non-contributory  Family History   Problem Relation Age of Onset     Prostate Cancer Maternal Grandfather      Substance Abuse Maternal Grandfather      Alcohol     Colon Cancer Father 60     Pancreatic Cancer Father 60     Prostate Cancer Father      Colorectal Cancer Father      Macular Degeneration Father      CANCER Father      Colorectal Cancer Maternal Grandmother      CANCER Maternal Grandmother      Substance Abuse Maternal Grandmother      Alcohol     Colorectal Cancer Paternal Grandmother      CANCER Mother      DIABETES Mother       3/2016     CEREBROVASCULAR DISEASE Mother      Passed away in Feb of this year, 80 years old.     Thyroid Disease Mother      Depression Mother      Asthma Sister      Had since birth     Thyroid Disease Sister      Depression Sister      Liver Disease No family hx of        EXAMINATION:  /75 (BP Location: Right arm)  Pulse 99  Temp 98.9  F (37.2  C) (Oral)  Resp 18  Ht 1.75 m (5' 8.9\")  Wt 86.2 kg (190 lb 0.6 oz)  SpO2 97%  BMI 28.15 kg/m2  GENERAL:  Well-developed, well-nourished, sitting at edge of bed in no acute distress.   EYES:  Eyes have anicteric sclerae without conjunctival injection. R eye tearing and R sided facial palsy.  ENT:  Oropharynx is moist without exudates. Poor dentition, actively chewing tobacco.  NECK:  Face/neck is wrapped " from surgical site with drain in place and small amount of serosanguineous output.  LUNGS:  Normal respiratory effort. Lung fields are clear to auscultation bilateral.  CARDIOVASCULAR:  Regular rate and rhythm with no murmurs, gallops or rubs.  ABDOMEN:  Normal bowel sounds, soft, nontender.   SKIN:  No acute rashes.   NEUROLOGIC:  R facial paralysis. Active x4 extremities.  PSYCH: Appropriate affect. Alert and oriented to person, place and time.  CURRENT LINES: PIV    RELEVANT DATA:     BASIC LABS   The following basic labs were personally reviewed:    Hematology Studies    Recent Labs   Lab Test  11/03/17   0702  11/02/17   1656  11/02/17   1020  11/02/17   0621   10/16/17   1225  09/27/17   1211  09/08/17   1012   WBC  4.1  5.6  10.2  5.9   --   3.0*  3.7*  2.7*   ANEU  3.0   --    --    --    --    --    --   1.8   AEOS  0.1   --    --    --    --    --    --   0.1   HGB  9.6*  10.1*  11.3*  11.7*   --   11.4*  10.5*  11.3*   MCV  106*  106*  103*  102*   --   105*  104*  106*   PLT  43*  38*  85*  47*   < >  41*  50*  37*    < > = values in this interval not displayed.       Metabolic Studies     Recent Labs   Lab Test  11/03/17   0702  11/02/17   1656  11/02/17   0708  10/16/17   1225  04/03/17   1016  12/19/16   1022  12/08/16   1517   NA  143  143   --   138  141   --   139   POTASSIUM  4.5  3.8  3.9  4.3  5.3   --   4.8   CHLORIDE  113*  112*   --   107  109   --   109   CO2  24  26   --   24  25   --   22   BUN  19  18   --   15  24   --   26   CR  1.06  0.96   --   0.94  1.06  1.14  1.12   GFRESTIMATED  73  82   --   84  73  67  69       Hepatic Studies    Recent Labs   Lab Test  11/03/17   0702  11/02/17   1656  10/16/17   1225  04/03/17   1016  12/19/16   1022  12/08/16   1517  10/17/16   1156   BILITOTAL  1.8*   --   1.2  1.3  1.5*  1.1  1.3   ALKPHOS  70   --   136  164*  106  116  117   ALBUMIN  2.7*  2.8*  3.0*  3.2*  3.8  3.4  3.2*   AST  44   --   58*  55*  55*  69*  56*   ALT  34   --   48  54   70  81*  58       Thyroid Studies    Recent Labs   Lab Test  06/09/17   1250   TSH  0.42       MICROBIOLOGY LABS  The following microbiology studies were personally reviewed:  Culture Micro   Date Value Ref Range Status   11/02/2017 Culture negative monitoring continues  Preliminary   11/02/2017 Culture negative after 20 hours  Preliminary   11/02/2017 Culture in progress  Preliminary   11/02/2017 Culture negative monitoring continues  Preliminary   11/02/2017 Culture negative after 19 hours  Preliminary   11/02/2017 Moderate growth  Streptococcus anginosus   (A)  Preliminary   11/02/2017 Culture in progress  Preliminary   10/11/2017 (A)  Final    Light growth  Streptococcus salivarius group  Susceptibility testing not routinely done         IMAGING RESULTS  CT SOFT TISSUE NECK W CONTRAST 9/20/2017 12:53 PM     History:  Disease of salivary gland, unspecified. History of swelling  over the right zygomatic arch for the last 6 weeks.      Comparison:  None available      Technique: Following intravenous administration of nonionic iodinated  contrast medium, thin section helical CT images were obtained from the  skull base down to the level of the aortic arch.  Axial, coronal and  sagittal reformations were performed with 3 mm slice thickness  reconstruction. Images were reviewed in soft tissue, lung and bone  windows.     Findings:   3.1 x 2.0 x 3.3 cm solid predominantly peripherally enhancing mass in  the superior aspect of the right parotid gland, which demonstrates  ill-defined hazy margins. The mass overlies the right zygomatic arch,  though there is no evidence of abnormal bony sclerosis or erosion. No  suspicious cervical lymphadenopathy.     Mucosal spaces are clear. Left parotid gland and submandibular glands  are normal. The thyroid gland is normal. Patent major cervical  vasculature. Atherosclerotic calcifications. Lung apices are clear.  Paranasal sinuses and mastoid air cells are clear. Orbits  and  visualized intercranial contents are unremarkable.      Impression:  1. Solid right parotid mass with infiltrative margins, malignancy  until proven otherwise. Differential includes primary salivary gland  malignancy or metastatic intraparotid lymph node. Recommend soft  tissue sampling.  2. No cervical adenopathy.

## 2017-11-03 NOTE — PROGRESS NOTES
ENT NOTE  11/2/2017    Called because patient is upset about not knowing what happened to him today. Spoke with patient on the phone and explained that I was not in his surgery but I could try to answer any questions he may have. He states that we never explained he would lose use of his hands or feet. Upon further questioning he said he's frustrated that he isn't able to text as well after his surgery but no other signs of focal weakness or signs of stroke. He wanted to know why his lymph nodes were not removed in surgery. I reviewed the operative note and the frozen pathology report which did not show any evidence of malignancy. I tried to explain these things to the patient but he perseverated on being misinformed and upcoming radiation treatment. I tried to explain that he is not undergoing radiation treatment at this time at that the final pathology needs to be reported. He was unhappy with any reassurance provided. The primary team will see him in the morning. Please let ENT know if there is any sign of focal weakness in the hands or feet or if any new issues arise.    Sharona Jay MD  Otolaryngology Resident

## 2017-11-03 NOTE — PROGRESS NOTES
Provider paged re:Pt wants to talk with a provider regarding procedure done today. He alleged he is unaware of what is going on. Refusing med and wants to go AMA. Please come and see patient. Emotional support support given and Pt told that a provider will come to see him. He verbalized understanding.

## 2017-11-03 NOTE — PROGRESS NOTES
"Otolaryngology Progress Note  November 3, 2017    S: No acute events overnight. Had fever to 102.2F last night when receiving platelet transfusion. Tmax 99F since then. Patient complaining of right arm/hand numbness and weakness overnight and this morning that is new since surgery. Also complaining of abdominal upset. Has had loose BM x 6 since midnight. Voiding spontaneously. Pain is controlled, not taking any pain medication.     O: /61 (BP Location: Right arm)  Pulse 91  Temp 99.4  F (37.4  C) (Oral)  Resp 18  Ht 1.75 m (5' 8.9\")  Wt 86.2 kg (190 lb 0.6 oz)  SpO2 98%  BMI 28.15 kg/m2   General: Alert and oriented x 3, No acute distress   HEENT: EOMI. HB 4/6 on the right with incomplete eye closure, no notable Bell's phenomenon. Right facial/neck incision c/d/i, diffuse ecchymosis of the surrounding skin. Neck soft and flat without fluctuance.    Pulmonary: Breathing non-labored on RA, no stridor, no accessory muscle use.      Intake/Output Summary (Last 24 hours) at 11/03/17 1351  Last data filed at 11/03/17 1000   Gross per 24 hour   Intake           2086.5 ml   Output             1545 ml   Net            541.5 ml     LUANNE drain output(s): (last 24 hours)/(last shift)  Right neck: 65 / 45 / 30 = 140 mL    LABS:  ROUTINE IP LABS (Last four results)  BMP  Recent Labs  Lab 11/03/17  0702 11/02/17  1656 11/02/17  0708    143  --    POTASSIUM 4.5 3.8 3.9   CHLORIDE 113* 112*  --    DEBORAH 8.7 8.5  --    CO2 24 26  --    BUN 19 18  --    CR 1.06 0.96  --    * 110*  --      CBC  Recent Labs  Lab 11/03/17  0702 11/02/17  1656 11/02/17  1020 11/02/17  0621   WBC 4.1 5.6 10.2 5.9   RBC 2.81* 2.92* 3.25* 3.39*   HGB 9.6* 10.1* 11.3* 11.7*   HCT 29.9* 30.9* 33.4* 34.7*   * 106* 103* 102*   MCH 34.2* 34.6* 34.8* 34.5*   MCHC 32.1 32.7 33.8 33.7   RDW 14.4 14.4 13.8 13.9   PLT 43* 38* 85* 47*     INR  Recent Labs  Lab 11/02/17  0621   INR 1.18*       A/P: Frandy Workman is a 52 year old male with " a past medical history of liver cirrhosis 2/2 ESTRADA, DM-2, HLD, GERD, depression, BPH, chronic anemia, and a right parotid mass who is now POD#1 s/p right superficial parotidectomy and right facial nerve repair. Parotid mass found to be consistent with abscess, all preliminary pathology negative for malignancy.     Given fever, need for IV antibiotics, infectious disease, neurology and medicine consults for workup of parotid abscess, new ulnar nerve palsy, diarrhea/abdominal upset patient will require hospital stay for at least 2 nights and will be admitted to inpatient.     Neuro:  - Pain control: oxycodone PRN, IV dilaudid PRN. No Tylenol given liver cirrhosis  - PTA Zyprexa    HEENT:  - Incision cares: clean with 0.9% sodium chloride and apply bacitracin Q8H x 24h then transition to Aquaphor Q8H   - Monitor and record LUANNE drain output Qshift  - Jaw bra to be worn at all times with gauze fluffs over right parotid defect.   - Right facial nerve paresis:    - Eye care: artificial tears drops 4 times daily and PRN, artificial tears ointment QHS, humidity chamber eye patch over right eye QHS     Respiratory:  - supplemental O2 PRN to keep sats >92%    CV/heme:  - hemodynamically stable  - Hx HTN: resume home carvedilol, spironolactone  - Hx HLD: resume PTA pravastatin.   - holding PTA ASA 81mg      FEN/GI:  - Regular diet, high consistent CHO  - Bowel regimen: holding  - Hx liver cirrhosis: on home lactulose 4 times daily, currently holding per medicine recommendations. Internal medicine consulted given diarrhea and abdominal upset in the setting of liver cirrhosis with encephalopathy. Continue PTA rifaximin   - C. Diff PCR ordered  - Continue omeprazole     :  - voiding independently    Endo  - Hx DM-2: holding home meds (dapagliflozin, insulin degludec 110u QAM, insulin novolog). ISS while inpatient. Will resume dapagliflozin tomorrow AM.     ID:  - IV Unasyn for right parotid abscess. ID consulted for further  evaluation/antibiotic management due to atypical presentation     PPX:  - SCDs  - IS    Disposition Plan   Expected discharge in 2 days to prior living arrangement once Antibiotic treatment plan is in place (ID consulted), surgical site healing appropriately, abdominal upset and diarrhea resolved/improving (medicine consulted), and ulnar nerve palsy addressed (neurology consulted).     Entered: Kera Dillon 11/03/2017, 2:07 PM       -- Patient and above plan discussed with Dr. Eddie Dillon, PA-C  Otolaryngology-Head & Neck Surgery  Please contact ENT with questions by dialing [phone] and entering job code [phone] when prompted.

## 2017-11-03 NOTE — PROGRESS NOTES
"The Pt s/p right superficial Parotidectomy, facial nerve repair. Dressing to right ear was removed by Pt and new dressing dressing with Kerlix applied and secured with jaw bra. Moisture chamber applied to right eye at hour of sleep. Continues on IV Unasyn. Pt is up ad karely.  Voiding spontaneously, loose BM x 3 overnight. Has scheduled Lactulose. Eating and drinking. Blood sugar  Thus . LUANNE drain  Intact and draining. Neuro intact and appears off, repeatedly ask questions. Easily redirectable. Blood pressure 125/59, temperature 97.5  F (36.4  C), temperature source Oral, resp. rate 16, height 1.75 m (5' 8.9\"), weight 86.2 kg (190 lb 0.6 oz), SpO2 98 %.    No acute changes, will continue to monitor.  "

## 2017-11-04 LAB
ALBUMIN SERPL-MCNC: 2.9 G/DL (ref 3.4–5)
ALP SERPL-CCNC: 79 U/L (ref 40–150)
ALT SERPL W P-5'-P-CCNC: 34 U/L (ref 0–70)
ANION GAP SERPL CALCULATED.3IONS-SCNC: 8 MMOL/L (ref 3–14)
AST SERPL W P-5'-P-CCNC: 37 U/L (ref 0–45)
BILIRUB DIRECT SERPL-MCNC: 0.4 MG/DL (ref 0–0.2)
BILIRUB SERPL-MCNC: 1.3 MG/DL (ref 0.2–1.3)
BUN SERPL-MCNC: 19 MG/DL (ref 7–30)
CALCIUM SERPL-MCNC: 9.1 MG/DL (ref 8.5–10.1)
CHLORIDE SERPL-SCNC: 112 MMOL/L (ref 94–109)
CO2 SERPL-SCNC: 23 MMOL/L (ref 20–32)
CREAT SERPL-MCNC: 0.91 MG/DL (ref 0.66–1.25)
ERYTHROCYTE [DISTWIDTH] IN BLOOD BY AUTOMATED COUNT: 14.1 % (ref 10–15)
GFR SERPL CREATININE-BSD FRML MDRD: 87 ML/MIN/1.7M2
GLUCOSE BLDC GLUCOMTR-MCNC: 106 MG/DL (ref 70–99)
GLUCOSE BLDC GLUCOMTR-MCNC: 124 MG/DL (ref 70–99)
GLUCOSE BLDC GLUCOMTR-MCNC: 131 MG/DL (ref 70–99)
GLUCOSE BLDC GLUCOMTR-MCNC: 185 MG/DL (ref 70–99)
GLUCOSE BLDC GLUCOMTR-MCNC: 200 MG/DL (ref 70–99)
GLUCOSE BLDC GLUCOMTR-MCNC: 84 MG/DL (ref 70–99)
GLUCOSE SERPL-MCNC: 158 MG/DL (ref 70–99)
HCT VFR BLD AUTO: 31.8 % (ref 40–53)
HGB BLD-MCNC: 10.5 G/DL (ref 13.3–17.7)
MAGNESIUM SERPL-MCNC: 1.8 MG/DL (ref 1.6–2.3)
MCH RBC QN AUTO: 34.7 PG (ref 26.5–33)
MCHC RBC AUTO-ENTMCNC: 33 G/DL (ref 31.5–36.5)
MCV RBC AUTO: 105 FL (ref 78–100)
PHOSPHATE SERPL-MCNC: 2.6 MG/DL (ref 2.5–4.5)
PLATELET # BLD AUTO: 42 10E9/L (ref 150–450)
POTASSIUM SERPL-SCNC: 4 MMOL/L (ref 3.4–5.3)
PROT SERPL-MCNC: 6.7 G/DL (ref 6.8–8.8)
RBC # BLD AUTO: 3.03 10E12/L (ref 4.4–5.9)
SODIUM SERPL-SCNC: 142 MMOL/L (ref 133–144)
WBC # BLD AUTO: 4.7 10E9/L (ref 4–11)

## 2017-11-04 PROCEDURE — 85027 COMPLETE CBC AUTOMATED: CPT | Performed by: STUDENT IN AN ORGANIZED HEALTH CARE EDUCATION/TRAINING PROGRAM

## 2017-11-04 PROCEDURE — 00000146 ZZHCL STATISTIC GLUCOSE BY METER IP

## 2017-11-04 PROCEDURE — 40000894 ZZH STATISTIC OT IP EVAL DEFER

## 2017-11-04 PROCEDURE — 36415 COLL VENOUS BLD VENIPUNCTURE: CPT | Performed by: STUDENT IN AN ORGANIZED HEALTH CARE EDUCATION/TRAINING PROGRAM

## 2017-11-04 PROCEDURE — 12000001 ZZH R&B MED SURG/OB UMMC

## 2017-11-04 PROCEDURE — A9270 NON-COVERED ITEM OR SERVICE: HCPCS | Mod: GY | Performed by: STUDENT IN AN ORGANIZED HEALTH CARE EDUCATION/TRAINING PROGRAM

## 2017-11-04 PROCEDURE — 25000132 ZZH RX MED GY IP 250 OP 250 PS 637: Mod: GY | Performed by: STUDENT IN AN ORGANIZED HEALTH CARE EDUCATION/TRAINING PROGRAM

## 2017-11-04 PROCEDURE — A9270 NON-COVERED ITEM OR SERVICE: HCPCS | Mod: GY | Performed by: HOSPITALIST

## 2017-11-04 PROCEDURE — 99233 SBSQ HOSP IP/OBS HIGH 50: CPT | Performed by: HOSPITALIST

## 2017-11-04 PROCEDURE — A9270 NON-COVERED ITEM OR SERVICE: HCPCS | Mod: GY | Performed by: PHYSICIAN ASSISTANT

## 2017-11-04 PROCEDURE — 25000132 ZZH RX MED GY IP 250 OP 250 PS 637: Mod: GY | Performed by: PHYSICIAN ASSISTANT

## 2017-11-04 PROCEDURE — 25000132 ZZH RX MED GY IP 250 OP 250 PS 637: Mod: GY | Performed by: HOSPITALIST

## 2017-11-04 PROCEDURE — 83735 ASSAY OF MAGNESIUM: CPT | Performed by: STUDENT IN AN ORGANIZED HEALTH CARE EDUCATION/TRAINING PROGRAM

## 2017-11-04 PROCEDURE — 80076 HEPATIC FUNCTION PANEL: CPT | Performed by: STUDENT IN AN ORGANIZED HEALTH CARE EDUCATION/TRAINING PROGRAM

## 2017-11-04 PROCEDURE — 80048 BASIC METABOLIC PNL TOTAL CA: CPT | Performed by: STUDENT IN AN ORGANIZED HEALTH CARE EDUCATION/TRAINING PROGRAM

## 2017-11-04 PROCEDURE — 25000128 H RX IP 250 OP 636: Performed by: STUDENT IN AN ORGANIZED HEALTH CARE EDUCATION/TRAINING PROGRAM

## 2017-11-04 PROCEDURE — 84100 ASSAY OF PHOSPHORUS: CPT | Performed by: STUDENT IN AN ORGANIZED HEALTH CARE EDUCATION/TRAINING PROGRAM

## 2017-11-04 RX ORDER — LACTULOSE 10 G/15ML
20 SOLUTION ORAL 2 TIMES DAILY
Status: DISCONTINUED | OUTPATIENT
Start: 2017-11-04 | End: 2017-11-05

## 2017-11-04 RX ORDER — LACTOBACILLUS RHAMNOSUS GG 10B CELL
1 CAPSULE ORAL 2 TIMES DAILY
Status: DISCONTINUED | OUTPATIENT
Start: 2017-11-04 | End: 2017-11-06 | Stop reason: HOSPADM

## 2017-11-04 RX ADMIN — WHITE PETROLATUM: 1.75 OINTMENT TOPICAL at 20:23

## 2017-11-04 RX ADMIN — LACTULOSE 20 G: 20 SOLUTION ORAL at 16:00

## 2017-11-04 RX ADMIN — AMPICILLIN SODIUM AND SULBACTAM SODIUM 3 G: 2; 1 INJECTION, POWDER, FOR SOLUTION INTRAMUSCULAR; INTRAVENOUS at 10:13

## 2017-11-04 RX ADMIN — AMPICILLIN SODIUM AND SULBACTAM SODIUM 3 G: 2; 1 INJECTION, POWDER, FOR SOLUTION INTRAMUSCULAR; INTRAVENOUS at 22:42

## 2017-11-04 RX ADMIN — MINERAL OIL AND WHITE PETROLATUM: 150; 830 OINTMENT OPHTHALMIC at 22:42

## 2017-11-04 RX ADMIN — OMEPRAZOLE 40 MG: 20 CAPSULE, DELAYED RELEASE ORAL at 08:24

## 2017-11-04 RX ADMIN — CYANOCOBALAMIN TAB 1000 MCG 1000 MCG: 1000 TAB at 08:25

## 2017-11-04 RX ADMIN — DEXTRAN 70, AND HYPROMELLOSE 2910 2 DROP: 1; 3 SOLUTION/ DROPS OPHTHALMIC at 14:00

## 2017-11-04 RX ADMIN — AMPICILLIN SODIUM AND SULBACTAM SODIUM 3 G: 2; 1 INJECTION, POWDER, FOR SOLUTION INTRAMUSCULAR; INTRAVENOUS at 05:14

## 2017-11-04 RX ADMIN — PRAVASTATIN SODIUM 20 MG: 20 TABLET ORAL at 08:26

## 2017-11-04 RX ADMIN — CARVEDILOL 12.5 MG: 12.5 TABLET, FILM COATED ORAL at 08:23

## 2017-11-04 RX ADMIN — Medication 1 CAPSULE: at 20:23

## 2017-11-04 RX ADMIN — DEXTRAN 70, AND HYPROMELLOSE 2910 2 DROP: 1; 3 SOLUTION/ DROPS OPHTHALMIC at 20:23

## 2017-11-04 RX ADMIN — LACTULOSE 20 G: 20 SOLUTION ORAL at 13:59

## 2017-11-04 RX ADMIN — WHITE PETROLATUM: 1.75 OINTMENT TOPICAL at 14:00

## 2017-11-04 RX ADMIN — OXYCODONE HYDROCHLORIDE 5 MG: 5 TABLET ORAL at 00:31

## 2017-11-04 RX ADMIN — VITAMIN D, TAB 1000IU (100/BT) 2000 UNITS: 25 TAB at 08:24

## 2017-11-04 RX ADMIN — LACTULOSE 20 G: 20 SOLUTION ORAL at 10:06

## 2017-11-04 RX ADMIN — DEXTRAN 70, AND HYPROMELLOSE 2910 2 DROP: 1; 3 SOLUTION/ DROPS OPHTHALMIC at 08:25

## 2017-11-04 RX ADMIN — RIFAXIMIN 550 MG: 550 TABLET ORAL at 20:23

## 2017-11-04 RX ADMIN — AMPICILLIN SODIUM AND SULBACTAM SODIUM 3 G: 2; 1 INJECTION, POWDER, FOR SOLUTION INTRAMUSCULAR; INTRAVENOUS at 17:39

## 2017-11-04 RX ADMIN — SPIRONOLACTONE 25 MG: 25 TABLET ORAL at 08:26

## 2017-11-04 RX ADMIN — CARVEDILOL 12.5 MG: 12.5 TABLET, FILM COATED ORAL at 17:39

## 2017-11-04 RX ADMIN — OLANZAPINE 2.5 MG: 2.5 TABLET, FILM COATED ORAL at 22:42

## 2017-11-04 RX ADMIN — RIFAXIMIN 550 MG: 550 TABLET ORAL at 08:24

## 2017-11-04 RX ADMIN — DEXTRAN 70, AND HYPROMELLOSE 2910 2 DROP: 1; 3 SOLUTION/ DROPS OPHTHALMIC at 17:39

## 2017-11-04 RX ADMIN — POTASSIUM CHLORIDE 20 MEQ: 750 TABLET, EXTENDED RELEASE ORAL at 08:24

## 2017-11-04 RX ADMIN — WHITE PETROLATUM: 1.75 OINTMENT TOPICAL at 10:06

## 2017-11-04 RX ADMIN — Medication 1 CAPSULE: at 13:59

## 2017-11-04 ASSESSMENT — VISUAL ACUITY
OU: NORMAL ACUITY

## 2017-11-04 NOTE — PROGRESS NOTES
[facility] ID SERVICE: ONGOING CONSULTATION   Frandy Workman : 1965 Sex: male:   Medical record number [id number] Attending Physician: Asiya Morgan MD  Date of Service: 2017    PROBLEM LIST:   #Right Sided Parotid Mass/Abscess  #ESTRADA Cirrhosis  #T2DM  #Possible R ulnar neuropathy  #Diarrhea - C.diff negative    RECOMMENDATIONS:   - Continue Ampicillin-Sulbactam 3g IV q6  - Follow-up final tissue culture final results  - Patient will need at least 2 weeks of IV antibiotics. Antibiotic selection pending insurance coverage and approval for home vs infusion center. Patient will need a PICC line.  - Patient encouraged to quit chewing tobacco    DISCUSSION:   Mr. Workman is a 53yo man who presented for superficial parotidectomy on  for a presumably malignancy parotid mass. On frozen section, there was granulation tissue with purulence most suggestive of an abscess. The entire superficial portion was resected but the deeper portion was left in place. On FNA from 10/11, cultures grew S. Salivarius. Operative cultures from  are now growing S. Anginosus. Both of these organisms are not unexpected given they are typical oral alvarado. Would continue Amp/Sulbactam IV pending final culture results. He will likely need prolonged treatment with at least 2 weeks of initial IV therapy given he did not previously respond to 2 weeks of Augmentin. Antibiotic selection will depend financial clearance for home vs infusion clinic.     Above discussed with Infectious Diseases Staff, Dr. Sergio Noel.  ID will continue to follow.      Wendy Romo D.O.  [facility] ID Fellow  [phone]       CHIEF INFECTIOUS DISEASES COMPLAINT:  Parotid Abscess    INTERVAL HISTORY:   Patient has ongoing lower abdominal pain which may be getting worse after his accident when standing. Endorses stools becoming more normal. Denies fever. Reports having some white exudate from his mouth.     ROS: A five-point review  "of systems was obtained and was negative with the exception of that which is described above.  Allergies   Allergen Reactions     Codeine Other (See Comments)     Cannot take due to liver       Allergies were reviewed.    No current outpatient prescriptions on file.       CURRENT ANTI-INFECTIVES:   Rifaximin 550mg for HE  Amoxicillin-Clavulanate 875/125 x2 weeks (9/29-10/13)  Cefazolin 2g x1 (11/2)  Ampicillin-Sulbactam (11/2- )    EXAMINATION:   /53 (BP Location: Left arm)  Pulse 85  Temp 97.6  F (36.4  C) (Oral)  Resp 16  Ht 1.75 m (5' 8.9\")  Wt 86.2 kg (190 lb 0.6 oz)  SpO2 98%  BMI 28.15 kg/m2  GENERAL:  Well-developed, well-nourished, sitting at edge of bed in no acute distress.   EYES:  Eyes have anicteric sclerae without conjunctival injection. R eye tearing and R sided facial palsy.  ENT:  Oropharynx is moist. Small amount of white exudate from R sofiya's duct.  NECK:  Surgical incision over R parotid gland with drain in place and serosanguineous output.  LUNGS:  Normal respiratory effort. Lung fields are clear to auscultation bilateral.  CARDIOVASCULAR:  Regular rate and rhythm with no murmurs, gallops or rubs.  ABDOMEN:  Normal bowel sounds, soft, nontender.   SKIN:  No acute rashes.   NEUROLOGIC:  R facial paralysis. Active x4 extremities.  PSYCH: Appropriate affect. Alert and oriented to person, place and time.  CURRENT LINES: PIV    NEW DATA/RESULTS:   All interval basic labs, microbiology results and imaging were reviewed.    Culture Micro   Date Value Ref Range Status   11/02/2017 Culture negative monitoring continues  Preliminary   11/02/2017 Culture negative after 20 hours  Preliminary   11/02/2017 Moderate growth  Streptococcus anginosus   (A)  Preliminary   11/02/2017 Susceptibility testing in progress  Preliminary   11/02/2017 Culture negative monitoring continues  Preliminary   11/02/2017 Culture negative after 19 hours  Preliminary   11/02/2017 Moderate growth  Streptococcus " aristides   (OLIVER)  Preliminary   11/02/2017 Susceptibility testing in progress  Preliminary       No lab results found.  Recent Labs   Lab Test  11/04/17   0745  11/03/17   0702  11/02/17   1656 11/02/17   1020  11/02/17   0621  10/16/17   1225   WBC  4.7  4.1  5.6  10.2  5.9  3.0*     Recent Labs   Lab Test  11/04/17   0745  11/03/17   0702  11/02/17   1656  10/16/17   1225   CR  0.91  1.06  0.96  0.94   GFRESTIMATED  87  73  82  84       Hematology Studies  Recent Labs   Lab Test  11/04/17   0745  11/03/17   0702  11/02/17 1656 11/02/17   1020  11/02/17   0621   10/16/17   1225   09/08/17   1012   WBC  4.7  4.1  5.6  10.2  5.9   --   3.0*   < >  2.7*   ANEU   --   3.0   --    --    --    --    --    --   1.8   AEOS   --   0.1   --    --    --    --    --    --   0.1   HCT  31.8*  29.9*  30.9*  33.4*  34.7*   --   34.1*   < >  33.1*   PLT  42*  43*  38*  85*  47*   < >  41*   < >  37*    < > = values in this interval not displayed.       Metabolic  Recent Labs   Lab Test  11/04/17   0745  11/03/17   0702  11/02/17   1656   NA  142  143  143   BUN  19  19  18   CO2  23  24  26   CR  0.91  1.06  0.96   GFRESTIMATED  87  73  82       Hepatic Studies  Recent Labs   Lab Test  11/04/17   0745  11/03/17   0702  11/02/17   1656  10/16/17   1225   BILITOTAL  1.3  1.8*   --   1.2   ALKPHOS  79  70   --   136   ALBUMIN  2.9*  2.7*  2.8*  3.0*   AST  37  44   --   58*   ALT  34  34   --   48
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
## 2017-11-04 NOTE — PROGRESS NOTES
Pt. gave to nurse  two  small containers of chewing tobacco.pt told to nurse to get rid of it,he decided to stop chewing tobacco today.Pt. went outside  with his jacket on, told nurse will be back in about hour.

## 2017-11-04 NOTE — PLAN OF CARE
Problem: Patient Care Overview  Goal: Plan of Care/Patient Progress Review  OT6A:  Received orders for OT evaluation/treatment.  Spoke to RN who stated pt is independent with ADLs.  Spoke to pt who stated he just arrived from walking/shopping right outside of hospital.  Pt is independent with ADLs and IADLs and does not required OT services.  Pt and RN agree.  Will complete orders.

## 2017-11-04 NOTE — PLAN OF CARE
Problem: Pain, Acute (Adult)  Goal: Identify Related Risk Factors and Signs and Symptoms  Related risk factors and signs and symptoms are identified upon initiation of Human Response Clinical Practice Guideline (CPG).  Outcome: No Change  VSS. A&Ox4. Neuros unchanged with absent movement of r side of face, R eye doesn't close, pt wore eye chamber for sleep. Pain managed with oxycodone. Incision intact, site care completed, fluff in place with jaw bra, pt removed during night, new fluff and jaw bra applied. Voiding spont. No loose BM this shift. PIV SL between abx. Up ind in room and halls. 2G Na+ diet. Cont to monitor and with POC.
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
## 2017-11-04 NOTE — PROGRESS NOTES
"Otolaryngology Progress Note  November 3, 2017    S: No acute events overnight. Patient reports persistent right-sided facial pain but is tolerating jaw bra. Patient is tolerating PO intake with less BMs that have been more normal.    O: /69 (BP Location: Left arm)  Pulse 85  Temp 96.4  F (35.8  C) (Oral)  Resp 16  Ht 1.75 m (5' 8.9\")  Wt 86.2 kg (190 lb 0.6 oz)  SpO2 100%  BMI 28.15 kg/m2  General: alert and oriented; sitting comfortably in bed; not in acute distress  HEENT:  - Extraocular muscles intact  - House-Brackmann IV/VI on the right with incomplete eye closure  - Right facial/neck incision well-approximate without drainage or bleeding; diffuse ecchymosis of the surrounding skin; possible hematoma over right zygomatic arch that is softer and smaller than previous exam  - Neck soft and flat without fluctuance  - LUANNE drain x1 with serosanguinous output holding suction  Pulmonary: breathing comfortably on room air; no stridor      Intake/Output Summary (Last 24 hours) at 11/04/17 1153  Last data filed at 11/04/17 0800   Gross per 24 hour   Intake              590 ml   Output              370 ml   Net              220 ml       LUANNE drain output(s): (last 24 hours)/(last shift)  Right neck: 30, NR, 70 = 100cc    LABS:  ROUTINE IP LABS (Last four results)  BMP    Recent Labs  Lab 11/04/17 0745 11/03/17 0702 11/02/17  1656 11/02/17  0708    143 143  --    POTASSIUM 4.0 4.5 3.8 3.9   CHLORIDE 112* 113* 112*  --    DEBORAH 9.1 8.7 8.5  --    CO2 23 24 26  --    BUN 19 19 18  --    CR 0.91 1.06 0.96  --    * 294* 110*  --      CBC    Recent Labs  Lab 11/04/17  0745 11/03/17  0702 11/02/17  1656 11/02/17  1020   WBC 4.7 4.1 5.6 10.2   RBC 3.03* 2.81* 2.92* 3.25*   HGB 10.5* 9.6* 10.1* 11.3*   HCT 31.8* 29.9* 30.9* 33.4*   * 106* 106* 103*   MCH 34.7* 34.2* 34.6* 34.8*   MCHC 33.0 32.1 32.7 33.8   RDW 14.1 14.4 14.4 13.8   PLT 42* 43* 38* 85*     INR    Recent Labs  Lab 11/02/17  0621   INR " 1.18*       A/P: Frandy Workman is a 52 year old male with a past medical history of liver cirrhosis 2/2 ESTRADA, DM-2, HLD, GERD, depression, BPH, chronic anemia, and a right parotid mass who is now POD#2 s/p right superficial parotidectomy and right facial nerve repair. Parotid mass found to be consistent with abscess, all preliminary pathology negative for malignancy.     Neuro:  - Pain control-   - Oxycodone 5mg po q3h PRN, dilaudid 0.3-0.5mg IV q4h PRN   - No Tylenol given liver cirrhosis  - PTA Zyprexa 2.5mg po qhs  - Right ulnar palsy- Neurology consulted   - Hand, wrist x-rays negative   - Occupational therapy consulted- f/u recommendations   - Consider EMG in 3-4w if symptoms persist    HEENT:  - Incision cares: clean with 0.9% sodium chloride and apply Aquaphor Q8H   - Monitor and record LUANNE drain output Qshift  - Jaw bra to be worn at all times with gauze fluffs over right parotid defect  - Right facial nerve paresis:    - Eye care: artificial tears drops qid and PRN, artificial tears ointment qhs, humidity chamber eye patch over right eye qhs    Respiratory:  - Saturating well on room air  - Supplemental O2 PRN to keep sats >92%    CV/heme:  - Hypertensive to 157 overnight; pulse within normal limits  - Hx HTN: resume PTA carvedilol 12.5mg po BID, spironolactone 25mg po qAM  - Hx HLD: PTA pravastatin 20mg po qd  - Holding PTA ASA 81mg    - Hgb 10.5    FEN/GI:  - Regular diet, high consistent CHO  - Cholecalciferol 2000u qAM; vitamin B-12 1000mcg po qAM  - Lactobacillus probiotic po BID  - Bowel regimen: holding  - Hx liver cirrhosis on PTA lactulose qid, rifaximin 550mg po BID- Medicine consulted   - Lactulose BID + PRN to titrate to 3-5 BMs per day   - Continue PTA rifaximin and spironolactone  - C. Diff PCR negative  - F/u Giardia antigen testing  - Omeprazole 40mg po qd    :  - Voiding independently    Endo  - Hx TIIDM- Medicine consulted   - PTA Farxiga 10 mg qd, tresiba 110 units qd, Novolog 1 units  per 4 gm carbs and SSI 1 unit per 25 mg/dL   - High blood glucose recordings postoperatively   - Insulin degludec 80u qd (75% of home dose)   - Continue mealtime insulin with carb counting   - Correctional insulin   - Hold home farxiga while in-house    ID:  - Afebrile; no leukocytosis  - 11/2 culture- Strep anginosus, moderate growth  - Right parotid abscess- Infectious Disease consulted   - Unasyn 3g IV q6h   - Awaiting sensitivities to determine outpatient antibiotic regimen    PPX:  - SCDs  - IS    Disposition Plan   Expected discharge in 2 days to prior living arrangement once antibiotic treatment plan is in place (ID consulted), surgical site healing appropriately, abdominal upset and diarrhea resolved/improving (medicine consulted), and ulnar nerve palsy addressed (neurology consulted).     Entered: Daniella Waller 11/04/2017, 11:44 AM       -- Patient was seen with staff, Dr. Eddie Waller MD- PGY-1  Otolaryngology- Head and Neck Surgery  Pager: [phone]  Please contact ENT with questions by dialing [phone] and entering job code [phone] when prompted.

## 2017-11-05 LAB
ALBUMIN SERPL-MCNC: 2.9 G/DL (ref 3.4–5)
ALP SERPL-CCNC: 109 U/L (ref 40–150)
ALT SERPL W P-5'-P-CCNC: 41 U/L (ref 0–70)
ANION GAP SERPL CALCULATED.3IONS-SCNC: 8 MMOL/L (ref 3–14)
AST SERPL W P-5'-P-CCNC: 44 U/L (ref 0–45)
BACTERIA SPEC CULT: ABNORMAL
BACTERIA SPEC CULT: ABNORMAL
BILIRUB DIRECT SERPL-MCNC: 0.3 MG/DL (ref 0–0.2)
BILIRUB SERPL-MCNC: 1.3 MG/DL (ref 0.2–1.3)
BUN SERPL-MCNC: 20 MG/DL (ref 7–30)
CALCIUM SERPL-MCNC: 8.9 MG/DL (ref 8.5–10.1)
CHLORIDE SERPL-SCNC: 111 MMOL/L (ref 94–109)
CO2 SERPL-SCNC: 23 MMOL/L (ref 20–32)
CREAT SERPL-MCNC: 0.94 MG/DL (ref 0.66–1.25)
ERYTHROCYTE [DISTWIDTH] IN BLOOD BY AUTOMATED COUNT: 14 % (ref 10–15)
GFR SERPL CREATININE-BSD FRML MDRD: 84 ML/MIN/1.7M2
GLUCOSE BLDC GLUCOMTR-MCNC: 216 MG/DL (ref 70–99)
GLUCOSE BLDC GLUCOMTR-MCNC: 217 MG/DL (ref 70–99)
GLUCOSE BLDC GLUCOMTR-MCNC: 252 MG/DL (ref 70–99)
GLUCOSE BLDC GLUCOMTR-MCNC: 264 MG/DL (ref 70–99)
GLUCOSE BLDC GLUCOMTR-MCNC: 300 MG/DL (ref 70–99)
GLUCOSE SERPL-MCNC: 240 MG/DL (ref 70–99)
HCT VFR BLD AUTO: 29.6 % (ref 40–53)
HGB BLD-MCNC: 9.8 G/DL (ref 13.3–17.7)
Lab: ABNORMAL
Lab: ABNORMAL
MAGNESIUM SERPL-MCNC: 1.7 MG/DL (ref 1.6–2.3)
MCH RBC QN AUTO: 34.6 PG (ref 26.5–33)
MCHC RBC AUTO-ENTMCNC: 33.1 G/DL (ref 31.5–36.5)
MCV RBC AUTO: 105 FL (ref 78–100)
PHOSPHATE SERPL-MCNC: 3.1 MG/DL (ref 2.5–4.5)
PLATELET # BLD AUTO: 50 10E9/L (ref 150–450)
POTASSIUM SERPL-SCNC: 4.7 MMOL/L (ref 3.4–5.3)
PROT SERPL-MCNC: 6.6 G/DL (ref 6.8–8.8)
RBC # BLD AUTO: 2.83 10E12/L (ref 4.4–5.9)
SODIUM SERPL-SCNC: 142 MMOL/L (ref 133–144)
SPECIMEN SOURCE: ABNORMAL
SPECIMEN SOURCE: ABNORMAL
WBC # BLD AUTO: 4.8 10E9/L (ref 4–11)

## 2017-11-05 PROCEDURE — 12000008 ZZH R&B INTERMEDIATE UMMC

## 2017-11-05 PROCEDURE — 25000132 ZZH RX MED GY IP 250 OP 250 PS 637: Mod: GY | Performed by: HOSPITALIST

## 2017-11-05 PROCEDURE — 25000128 H RX IP 250 OP 636: Performed by: STUDENT IN AN ORGANIZED HEALTH CARE EDUCATION/TRAINING PROGRAM

## 2017-11-05 PROCEDURE — 25000132 ZZH RX MED GY IP 250 OP 250 PS 637: Mod: GY | Performed by: STUDENT IN AN ORGANIZED HEALTH CARE EDUCATION/TRAINING PROGRAM

## 2017-11-05 PROCEDURE — A9270 NON-COVERED ITEM OR SERVICE: HCPCS | Mod: GY | Performed by: PHYSICIAN ASSISTANT

## 2017-11-05 PROCEDURE — 25000132 ZZH RX MED GY IP 250 OP 250 PS 637: Mod: GY | Performed by: PHYSICIAN ASSISTANT

## 2017-11-05 PROCEDURE — A9270 NON-COVERED ITEM OR SERVICE: HCPCS | Mod: GY | Performed by: STUDENT IN AN ORGANIZED HEALTH CARE EDUCATION/TRAINING PROGRAM

## 2017-11-05 PROCEDURE — 85027 COMPLETE CBC AUTOMATED: CPT | Performed by: STUDENT IN AN ORGANIZED HEALTH CARE EDUCATION/TRAINING PROGRAM

## 2017-11-05 PROCEDURE — 80076 HEPATIC FUNCTION PANEL: CPT | Performed by: STUDENT IN AN ORGANIZED HEALTH CARE EDUCATION/TRAINING PROGRAM

## 2017-11-05 PROCEDURE — A9270 NON-COVERED ITEM OR SERVICE: HCPCS | Mod: GY | Performed by: HOSPITALIST

## 2017-11-05 PROCEDURE — 40000556 ZZH STATISTIC PERIPHERAL IV START W US GUIDANCE

## 2017-11-05 PROCEDURE — 00000146 ZZHCL STATISTIC GLUCOSE BY METER IP

## 2017-11-05 PROCEDURE — 80048 BASIC METABOLIC PNL TOTAL CA: CPT | Performed by: STUDENT IN AN ORGANIZED HEALTH CARE EDUCATION/TRAINING PROGRAM

## 2017-11-05 PROCEDURE — 83735 ASSAY OF MAGNESIUM: CPT | Performed by: STUDENT IN AN ORGANIZED HEALTH CARE EDUCATION/TRAINING PROGRAM

## 2017-11-05 PROCEDURE — 99233 SBSQ HOSP IP/OBS HIGH 50: CPT | Performed by: HOSPITALIST

## 2017-11-05 PROCEDURE — 84100 ASSAY OF PHOSPHORUS: CPT | Performed by: STUDENT IN AN ORGANIZED HEALTH CARE EDUCATION/TRAINING PROGRAM

## 2017-11-05 RX ORDER — LACTULOSE 10 G/15ML
20 SOLUTION ORAL
Status: DISCONTINUED | OUTPATIENT
Start: 2017-11-05 | End: 2017-11-06

## 2017-11-05 RX ORDER — ERTAPENEM 1 G/1
1 INJECTION, POWDER, LYOPHILIZED, FOR SOLUTION INTRAMUSCULAR; INTRAVENOUS EVERY 24 HOURS
Status: DISCONTINUED | OUTPATIENT
Start: 2017-11-05 | End: 2017-11-06 | Stop reason: HOSPADM

## 2017-11-05 RX ADMIN — DEXTRAN 70, AND HYPROMELLOSE 2910 2 DROP: 1; 3 SOLUTION/ DROPS OPHTHALMIC at 08:59

## 2017-11-05 RX ADMIN — Medication 1 CAPSULE: at 20:00

## 2017-11-05 RX ADMIN — DEXTRAN 70, AND HYPROMELLOSE 2910 2 DROP: 1; 3 SOLUTION/ DROPS OPHTHALMIC at 20:01

## 2017-11-05 RX ADMIN — POTASSIUM CHLORIDE 20 MEQ: 750 TABLET, EXTENDED RELEASE ORAL at 08:58

## 2017-11-05 RX ADMIN — LACTULOSE 20 G: 20 SOLUTION ORAL at 04:24

## 2017-11-05 RX ADMIN — CARVEDILOL 12.5 MG: 12.5 TABLET, FILM COATED ORAL at 08:59

## 2017-11-05 RX ADMIN — Medication 1 CAPSULE: at 08:58

## 2017-11-05 RX ADMIN — LACTULOSE 20 G: 20 SOLUTION ORAL at 17:57

## 2017-11-05 RX ADMIN — WHITE PETROLATUM: 1.75 OINTMENT TOPICAL at 08:59

## 2017-11-05 RX ADMIN — CARVEDILOL 12.5 MG: 12.5 TABLET, FILM COATED ORAL at 17:57

## 2017-11-05 RX ADMIN — DEXTRAN 70, AND HYPROMELLOSE 2910 2 DROP: 1; 3 SOLUTION/ DROPS OPHTHALMIC at 17:22

## 2017-11-05 RX ADMIN — MINERAL OIL AND WHITE PETROLATUM: 150; 830 OINTMENT OPHTHALMIC at 21:47

## 2017-11-05 RX ADMIN — AMPICILLIN SODIUM AND SULBACTAM SODIUM 3 G: 2; 1 INJECTION, POWDER, FOR SOLUTION INTRAMUSCULAR; INTRAVENOUS at 11:24

## 2017-11-05 RX ADMIN — LACTULOSE 20 G: 20 SOLUTION ORAL at 01:50

## 2017-11-05 RX ADMIN — WHITE PETROLATUM: 1.75 OINTMENT TOPICAL at 12:04

## 2017-11-05 RX ADMIN — VITAMIN D, TAB 1000IU (100/BT) 2000 UNITS: 25 TAB at 08:59

## 2017-11-05 RX ADMIN — LACTULOSE 20 G: 20 SOLUTION ORAL at 16:34

## 2017-11-05 RX ADMIN — ERTAPENEM SODIUM 1 G: 1 INJECTION, POWDER, LYOPHILIZED, FOR SOLUTION INTRAMUSCULAR; INTRAVENOUS at 17:33

## 2017-11-05 RX ADMIN — OLANZAPINE 2.5 MG: 2.5 TABLET, FILM COATED ORAL at 21:42

## 2017-11-05 RX ADMIN — LACTULOSE 20 G: 20 SOLUTION ORAL at 15:05

## 2017-11-05 RX ADMIN — SPIRONOLACTONE 25 MG: 25 TABLET ORAL at 08:59

## 2017-11-05 RX ADMIN — RIFAXIMIN 550 MG: 550 TABLET ORAL at 20:00

## 2017-11-05 RX ADMIN — CYANOCOBALAMIN TAB 1000 MCG 1000 MCG: 1000 TAB at 08:59

## 2017-11-05 RX ADMIN — AMPICILLIN SODIUM AND SULBACTAM SODIUM 3 G: 2; 1 INJECTION, POWDER, FOR SOLUTION INTRAMUSCULAR; INTRAVENOUS at 04:32

## 2017-11-05 RX ADMIN — DEXTRAN 70, AND HYPROMELLOSE 2910 1 DROP: 1; 3 SOLUTION/ DROPS OPHTHALMIC at 02:54

## 2017-11-05 RX ADMIN — WHITE PETROLATUM: 1.75 OINTMENT TOPICAL at 20:01

## 2017-11-05 RX ADMIN — PRAVASTATIN SODIUM 20 MG: 20 TABLET ORAL at 09:01

## 2017-11-05 RX ADMIN — DEXTRAN 70, AND HYPROMELLOSE 2910 2 DROP: 1; 3 SOLUTION/ DROPS OPHTHALMIC at 11:29

## 2017-11-05 RX ADMIN — RIFAXIMIN 550 MG: 550 TABLET ORAL at 08:58

## 2017-11-05 RX ADMIN — LACTULOSE 20 G: 20 SOLUTION ORAL at 06:28

## 2017-11-05 RX ADMIN — LACTULOSE 20 G: 20 SOLUTION ORAL at 21:42

## 2017-11-05 RX ADMIN — OMEPRAZOLE 40 MG: 20 CAPSULE, DELAYED RELEASE ORAL at 08:58

## 2017-11-05 RX ADMIN — DEXTRAN 70, AND HYPROMELLOSE 2910 1 DROP: 1; 3 SOLUTION/ DROPS OPHTHALMIC at 06:30

## 2017-11-05 ASSESSMENT — VISUAL ACUITY
OU: NORMAL ACUITY

## 2017-11-05 NOTE — PLAN OF CARE
Problem: Patient Care Overview  Goal: Plan of Care/Patient Progress Review  Outcome: Improving  VSS,pt denies pain neuro check positive for right face droop,asymmetrical face, not able to close right eye ,but pt. stated it is getting better.pt is alert and oriented ,up independently denies numbness and tingling,,has steady gait,pt had 2 lactulose per PRN order ,and pt had 4 BMs.incision clean an dry mild redness and edema noted around incision.will continue to monitor.

## 2017-11-05 NOTE — PROGRESS NOTES
[facility], [place]  Internal Medicine - Gold Service  Consult Note    Today's Changes/Recommendations  1) Schedule lactulose TID with meals according to his home plan (hold if >5 BM)  2) Reduce degludec insulin to 70 units daily  3) Change to regular diet per pt request    Assessment & Plan   rFandy Workman is a 52 year old male admitted on 11/2/2017. He has a history of ESTRADA cirrhosis c/b ascites/HE, insulin-dependent type 2 DM, and was admitted to the ENT service following right superficial parotidectomy w/ right facial nerve repair. The resected mass was consistent with abscess (prelim path negative for malignancy).       Right parotid mass s/p right superficial parotidectomy w/ facial nerve repair:  Appearance most consistent with abscess. Prelim path negative for malignancy. Initial cultures growing strep anginosus.   - post op management per ENT  - continue unasyn, follow cultures  - ID consulted for assistance with antibiotic plan    ESTRADA cirrhosis, compensated  History of ascites, HE, and EV with banding. Some confusion reported overnight; this may have been low grade HE vs delirium. Normalized. Current goal 4-5 bowel movements a day. Acute diarrhea seems to have slowed so will increase lactulose frequency.   - lactulose TID plus prn dosing to achieve 4-5 BMs daily  - continue home rifaximin, spironolactone     Type 2 DM, insulin-dependent  PTA on Farxiga 10 mg daily, tresiba 110 units daily, Novolog 1 units per 4 gm carbs and SSI 1 unit per 25 mg/dL glucose >125. Slightly lower lunchtime read yesterday (85); will reduce long acting insulin slightly.   - insulin degludec 70 units daily starting today (will adjust daily as needed)  - continue mealtime insulin w/ carb counting (1 unit per 4g)   - correctional insulin   - hold home farxiga while inpt    Acute, non-inflammatory, non-bloody diarrhea  Watery stools started after admission. Most likely due to antibiotics. C Diff  negative. No further work-up indicated at this time.   - monitor stool output  - titrate lactulose to 4-5 BM daily as above      Hypertension, chronic: home aldactone, carvedilol   GERD: home omeprazole  Depression/bipolar disorder: Home zyprexa.         Diet: Regular Diet Adult  Fluids: None  DVT Prophylaxis: Low Risk/Ambulatory with no VTE prophylaxis indicated  Code Status: Full Code    Disposition Plan   Expected discharge: 2 - 3 days, recommended to prior living arrangement once antibiotic plan established.     Entered: Americo Leon 11/05/2017, 7:41 AM   Information in the above section will display in the discharge planner report.      The patient's care was discussed with the Dr. Morgan and the ENT team, ID, the patient, and his bedside RN.    Americo Leon  Internal Medicine Staff Hospitalist Service  [facility]  Pager: [phone]  Please see sticky note for cross cover information    Interval History   Bedside RN reported that the pt woke up confused overnight, tangential speech. Lactulose given x3 per protocol with resolution of symptoms.   This morning, Miller is wanting to focus on returning to his normal medication routine. Apparently he left the unit yesterday and went to the bookstore. Didn't eat for a stretch of time in the middle of the day.   He is having loose stools, 4 yesterday and then 3 overnight.   Continues to report lower abdominal pain.     A 4-point ROS is negative other than what is stated above.    Data reviewed today: I reviewed all medications, new labs and imaging results over the last 24 hours. I personally reviewed no images or EKG's today.    Physical Exam   Vital Signs: Temp: 96.5  F (35.8  C) Temp src: Oral BP: 118/62   Heart Rate: 87 Resp: 16 SpO2: 96 % O2 Device: None (Room air)    Weight: 190 lbs .58 oz  General Appearance: NAD. Comfortable.  Respiratory: Nonlabored. Lungs clear bilaterally.   Cardiovascular: RRR. No extra heart sounds.   GI: Soft.  Mild TTP in the right lower quadrant.    Neuro: Alert. Conversant. No asterixis.   Psych: Somewhat pressured speech but linear thought process.

## 2017-11-05 NOTE — PROGRESS NOTES
"Otolaryngology Progress Note  November 3, 2017    S: No acute events overnight. Patient reports improved ight-sided facial pain but is tolerating jaw bra. Patient is tolerating PO intake with less BMs that have been more normal. Medicine colleagues adjusted lactulose and insulin regimen. Says his hand is completely back to normal.     O: /62  Pulse 85  Temp 96.6  F (35.9  C) (Oral)  Resp 16  Ht 1.75 m (5' 8.9\")  Wt 86.2 kg (190 lb 0.6 oz)  SpO2 100%  BMI 28.15 kg/m2  General: alert and oriented; sitting comfortably in bed; not in acute distress  HEENT:  - Extraocular muscles intact  - House-Brackmann IV/VI on the right with incomplete eye closure  - Right facial/neck incision well-approximate without drainage or bleeding; diffuse ecchymosis of the surrounding skin; possible hematoma over right zygomatic arch that is softer and smaller than previous exam  - Neck soft and flat without fluctuance  - No purulence intraorally after milking the parotid duct.   - LUANNE drain x1 with serosanguinous output holding suction  Extremiteis: right hand with firm symmetric  and no dysesthesias to light finger touch.   Pulmonary: breathing comfortably on room air; no stridor      Intake/Output Summary (Last 24 hours) at 11/05/17 1205  Last data filed at 11/05/17 0900   Gross per 24 hour   Intake              840 ml   Output               30 ml   Net              810 ml       LUANNE drain output(s): (last 24 hours)/(last shift)  Right neck: 10, 10, 10= 30cc    LABS:  ROUTINE IP LABS (Last four results)  BMP    Recent Labs  Lab 11/05/17  1000 11/04/17  0745 11/03/17  0702 11/02/17  1656    142 143 143   POTASSIUM 4.7 4.0 4.5 3.8   CHLORIDE 111* 112* 113* 112*   DEBORAH 8.9 9.1 8.7 8.5   CO2 23 23 24 26   BUN 20 19 19 18   CR 0.94 0.91 1.06 0.96   * 158* 294* 110*     CBC    Recent Labs  Lab 11/05/17  1000 11/04/17  0745 11/03/17  0702 11/02/17  1656   WBC 4.8 4.7 4.1 5.6   RBC 2.83* 3.03* 2.81* 2.92*   HGB 9.8* " 10.5* 9.6* 10.1*   HCT 29.6* 31.8* 29.9* 30.9*   * 105* 106* 106*   MCH 34.6* 34.7* 34.2* 34.6*   MCHC 33.1 33.0 32.1 32.7   RDW 14.0 14.1 14.4 14.4   PLT 50* 42* 43* 38*     INR    Recent Labs  Lab 11/02/17  0621   INR 1.18*       A/P: Frandy Workman is a 52 year old male with a past medical history of liver cirrhosis 2/2 ESTRADA, DM-2, HLD, GERD, depression, BPH, chronic anemia, and a right parotid mass who is now POD#3 s/p right superficial parotidectomy and right facial nerve repair. Parotid mass found to be consistent with abscess, all preliminary pathology negative for malignancy.     Neuro:  - Pain control-   - Oxycodone 5mg po q3h PRN. Dc dilaudid since he hasn't needed it.    - No Tylenol given liver cirrhosis  - PTA Zyprexa 2.5mg po qhs  - Right ulnar palsy- Neurology consulted- appreciate recs.    - Resolved.     HEENT:  - Incision cares: clean with 0.9% sodium chloride and apply Aquaphor Q8H   - Monitor and record LUANNE drain output Qshift  - Jaw bra to be worn at all times with gauze fluffs over right parotid defect  - Right facial nerve paresis:    - Eye care: artificial tears drops qid and PRN, artificial tears ointment qhs, humidity chamber eye patch over right eye qhs  - NO MASSAGES TO PAROTID, high risk of bleeding.     Respiratory:  - Saturating well on room air  - Supplemental O2 PRN to keep sats >92%    CV/heme:  - Hemodynamically stable  - Hx HTN: resume PTA carvedilol 12.5mg po BID, spironolactone 25mg po qAM  - Hx HLD: PTA pravastatin 20mg po qd  - Holding PTA ASA 81mg    - Hgb 9.8  (10.5)    FEN/GI:  - Regular diet   - Cholecalciferol 2000u qAM; vitamin B-12 1000mcg po qAM  - Lactobacillus probiotic po BID  - Bowel regimen: holding  - Hx liver cirrhosis on PTA lactulose qid, rifaximin 550mg po BID- Medicine consulted   - Lactulose BID + PRN to titrate to 3-5 BMs per day, increased to TID.   - Continue PTA rifaximin and spironolactone  - C. Diff PCR negative  - F/u Giardia antigen testing,  pending  - Omeprazole 40mg po qd    :  - Voiding independently    Endo  - Hx TIIDM- Medicine consulted   - PTA Farxiga 10 mg qd, tresiba 110 units qd, Novolog 1 units per 4 gm carbs and SSI 1 unit per 25 mg/dL   - High blood glucose recordings postoperatively   - Insulin degludec reduced to 70 units by Medicine colleagues.    - Continue mealtime insulin with carb counting   - Correctional insulin   - Hold home farxiga while in-house    ID:  - Afebrile; no leukocytosis  - 11/2 culture- Strep anginosus, moderate growth  - Right parotid abscess- Infectious Disease consulted   - Unasyn 3g IV q6h   - Awaiting sensitivities to determine outpatient antibiotic regimen   - Per ID recs, he will probably need a PICC line placed and antibiotic choice will be pending insurance (unasyn vs ertapenem)    - tentative picc line placement tomorrow.    PPX:  - SCDs  - IS    Disposition Plan   Expected discharge in 1 days to prior living arrangement once antibiotic treatment plan is in place (ID consulted), surgical site healing appropriately, abdominal upset and diarrhea resolved/improving (medicine consulted), and ulnar nerve palsy addressed (neurology consulted).     Entered: Andie Mcgowan 11/05/2017, 12:11 PM       -- Patient was seen with staff, Dr. Eddie Mcgowan MD  PGY2 [facility]NS

## 2017-11-05 NOTE — PROGRESS NOTES
[facility] ID SERVICE: ONGOING CONSULTATION   Frandy Workman : 1965 Sex: male:   Medical record number [id number] Attending Physician: Asiya Morgan MD  Date of Service: 2017    PROBLEM LIST:   #Right Sided Parotid Abscess  #ESTRADA Cirrhosis  #T2DM  #Possible R ulnar neuropathy    RECOMMENDATIONS:   - Discontinue Ampicillin-Sulbactam  - Start Ertapenem 1g IV q24   - Patient should f/u with Dr. Sergio Noel in ID clinic on 2017. At that time, will determine duration of treatment and need for ongoing IV therapy vs PO antibiotics.    DISCUSSION:   Mr. Workman is a 51yo man who presented for superficial parotidectomy on  for a presumably malignancy parotid mass. On frozen section, there was granulation tissue with purulence most suggestive of an abscess. The entire superficial portion was resected but the deeper portion was left in place. On FNA from 10/11, cultures grew S. Salivarius. Operative cultures from  are now growing S. Anginosus. Both of these organisms are not unexpected given they are typical oral alvarado. Will switch from Amp/Sulbactam to Ertapenem for ease of dosing to cover both the S. Anginosus and empirically treat anaerobic organisms. Patient will need at least 2 weeks of IV antibiotics. Total duration and IV vs PO treatment will be made when he is seen in follow-up with Dr. Noel on Friday, .      Above discussed with Infectious Diseases Staff, Dr. Sergio Noel.  ID will sign off. Please do not hesitate to page with any questions or concerns.      Wendy Romo D.O.  [facility] ID Fellow  [phone]       CHIEF INFECTIOUS DISEASES COMPLAINT:  Parotid Abscess    INTERVAL HISTORY:   Patient is doing well today. Denies fevers. No longer having oral exudate. Has decided to stop chewing tobacco and has not had any since yesterday morning. Anxious to go home.    ROS: A five-point review of systems was obtained and was negative with the exception of  "that which is described above.  Allergies   Allergen Reactions     Codeine Other (See Comments)     Cannot take due to liver       Allergies were reviewed.    No current outpatient prescriptions on file.       CURRENT ANTI-INFECTIVES:   Rifaximin 550mg for HE  Amoxicillin-Clavulanate 875/125 x2 weeks (9/29-10/13)  Cefazolin 2g x1 (11/2)  Ampicillin-Sulbactam (11/2- )    EXAMINATION:   /62  Pulse 85  Temp 96.9  F (36.1  C) (Oral)  Resp 16  Ht 1.75 m (5' 8.9\")  Wt 86.2 kg (190 lb 0.6 oz)  SpO2 99%  BMI 28.15 kg/m2  GENERAL:  Well-developed, well-nourished, laying in bed in no acute distress.   EYES:  Eyes have anicteric sclerae without conjunctival injection. R eye tearing and R sided facial palsy.  ENT:  Oropharynx is moist. No discharge from sofiya's duct.  NECK:  Surgical incision over R parotid gland with drain in place and serosanguineous output.  LUNGS:  Normal respiratory effort. Lung fields are clear to auscultation bilateral.  CARDIOVASCULAR:  Regular rate and rhythm with no murmurs, gallops or rubs.  ABDOMEN:  Normal bowel sounds, soft, nontender.   SKIN:  No acute rashes.   NEUROLOGIC:  R facial paralysis. Active x4 extremities.  PSYCH: Appropriate affect. Alert and oriented to person, place and time.  CURRENT LINES: PIV    NEW DATA/RESULTS:   All interval basic labs, microbiology results and imaging were reviewed.    Culture Micro   Date Value Ref Range Status   11/02/2017 Culture negative monitoring continues  Preliminary   11/02/2017 Culture negative after 20 hours  Preliminary   11/02/2017 Moderate growth  Streptococcus anginosus   (A)  Final   11/02/2017 Culture negative monitoring continues  Preliminary   11/02/2017 Culture negative after 19 hours  Preliminary   11/02/2017 Moderate growth  Streptococcus anginosus   (A)  Final       No lab results found.  Recent Labs   Lab Test  11/05/17   1000  11/04/17   0745  11/03/17   0702  11/02/17   1656  11/02/17   1020  11/02/17   0621   WBC  4.8 "  4.7  4.1  5.6  10.2  5.9     Recent Labs   Lab Test  11/05/17   1000  11/04/17   0745  11/03/17   0702  11/02/17   1656   CR  0.94  0.91  1.06  0.96   GFRESTIMATED  84  87  73  82       Hematology Studies  Recent Labs   Lab Test  11/05/17   1000  11/04/17   0745  11/03/17   0702  11/02/17   1656  11/02/17   1020  11/02/17   0621   09/08/17   1012   WBC  4.8  4.7  4.1  5.6  10.2  5.9   < >  2.7*   ANEU   --    --   3.0   --    --    --    --   1.8   AEOS   --    --   0.1   --    --    --    --   0.1   HCT  29.6*  31.8*  29.9*  30.9*  33.4*  34.7*   < >  33.1*   PLT  50*  42*  43*  38*  85*  47*   < >  37*    < > = values in this interval not displayed.       Metabolic  Recent Labs   Lab Test  11/05/17   1000  11/04/17   0745  11/03/17   0702   NA  142  142  143   BUN  20  19  19   CO2  23  23  24   CR  0.94  0.91  1.06   GFRESTIMATED  84  87  73       Hepatic Studies  Recent Labs   Lab Test  11/05/17   1000  11/04/17   0745  11/03/17   0702   BILITOTAL  1.3  1.3  1.8*   ALKPHOS  109  79  70   ALBUMIN  2.9*  2.9*  2.7*   AST  44  37  44   ALT  41  34  34

## 2017-11-05 NOTE — PROGRESS NOTES
ENT Brief Progress Note  11/05/2017     Contacted by infectious disease service regarding recommendation of antibiotics. Antibiotics changed to Ertapenem 1g q24h. Follow up with Sergio Ford in infectious disease clinic in 2 weeks.    Naresh Storey  Otolaryngology Resident - [id number]  [phone]  Please page ENT with questions by dialing * [phone] and entering job code [phone] when prompted

## 2017-11-05 NOTE — PLAN OF CARE
Problem: Patient Care Overview  Goal: Plan of Care/Patient Progress Review  Outcome: Improving  POD # 3 s/p right superficial parotidectomy and right facial nerve repair. Right incision open to air, incision sutures approximated with no drainage noted. LUANNE x 1 right neck to bulb suction. VSS. Neuro intact ex. right facial droop, unable to close right eye lid. Right eye gtt PRN q1h x3 HS Patient became confused and illogical HS. Per heptic encephalopathy protocol patient received three doses of lactulose 20g/30ml q2h, MD paged about protocol initiation. Patients orientation improved. BM x 3 HS. Up independently. 2 gram sodium diet. PIV SL in between antibiotics.  @ 0200. Continue to monitor.

## 2017-11-05 NOTE — PLAN OF CARE
Problem: Pain, Acute (Adult)  Goal: Identify Related Risk Factors and Signs and Symptoms  Related risk factors and signs and symptoms are identified upon initiation of Human Response Clinical Practice Guideline (CPG).   Outcome: Improving  Right facial incision CDI. Wearing jaw bra intermittently. Alert and oriented. Refused noon lactulose stating he had 5 stools already today. Denies pain. Ambulating independently in room and halls. Blood glucose checks covered with sliding scale insulin and carbs covered.

## 2017-11-06 ENCOUNTER — HOSPITAL ENCOUNTER (INPATIENT)
Dept: VASCULAR ULTRASOUND | Facility: CLINIC | Age: 52
DRG: 134 | End: 2017-11-06
Attending: PHYSICIAN ASSISTANT | Admitting: OTOLARYNGOLOGY
Payer: MEDICARE

## 2017-11-06 VITALS
SYSTOLIC BLOOD PRESSURE: 123 MMHG | BODY MASS INDEX: 28.15 KG/M2 | DIASTOLIC BLOOD PRESSURE: 63 MMHG | HEIGHT: 69 IN | RESPIRATION RATE: 16 BRPM | WEIGHT: 190.04 LBS | HEART RATE: 68 BPM | TEMPERATURE: 97.8 F | OXYGEN SATURATION: 100 %

## 2017-11-06 LAB
ALBUMIN SERPL-MCNC: 2.7 G/DL (ref 3.4–5)
ALP SERPL-CCNC: 131 U/L (ref 40–150)
ALT SERPL W P-5'-P-CCNC: 42 U/L (ref 0–70)
ANION GAP SERPL CALCULATED.3IONS-SCNC: 7 MMOL/L (ref 3–14)
AST SERPL W P-5'-P-CCNC: 40 U/L (ref 0–45)
BILIRUB DIRECT SERPL-MCNC: 0.3 MG/DL (ref 0–0.2)
BILIRUB SERPL-MCNC: 0.7 MG/DL (ref 0.2–1.3)
BUN SERPL-MCNC: 23 MG/DL (ref 7–30)
CALCIUM SERPL-MCNC: 8.9 MG/DL (ref 8.5–10.1)
CHLORIDE SERPL-SCNC: 110 MMOL/L (ref 94–109)
CO2 SERPL-SCNC: 23 MMOL/L (ref 20–32)
CREAT SERPL-MCNC: 0.93 MG/DL (ref 0.66–1.25)
ERYTHROCYTE [DISTWIDTH] IN BLOOD BY AUTOMATED COUNT: 14 % (ref 10–15)
GFR SERPL CREATININE-BSD FRML MDRD: 85 ML/MIN/1.7M2
GLUCOSE BLDC GLUCOMTR-MCNC: 137 MG/DL (ref 70–99)
GLUCOSE BLDC GLUCOMTR-MCNC: 271 MG/DL (ref 70–99)
GLUCOSE BLDC GLUCOMTR-MCNC: 394 MG/DL (ref 70–99)
GLUCOSE SERPL-MCNC: 365 MG/DL (ref 70–99)
HCT VFR BLD AUTO: 29.2 % (ref 40–53)
HGB BLD-MCNC: 9.6 G/DL (ref 13.3–17.7)
MAGNESIUM SERPL-MCNC: 1.8 MG/DL (ref 1.6–2.3)
MCH RBC QN AUTO: 34.7 PG (ref 26.5–33)
MCHC RBC AUTO-ENTMCNC: 32.9 G/DL (ref 31.5–36.5)
MCV RBC AUTO: 105 FL (ref 78–100)
PHOSPHATE SERPL-MCNC: 2.7 MG/DL (ref 2.5–4.5)
PLATELET # BLD AUTO: 42 10E9/L (ref 150–450)
POTASSIUM SERPL-SCNC: 5.3 MMOL/L (ref 3.4–5.3)
PROT SERPL-MCNC: 6.4 G/DL (ref 6.8–8.8)
RBC # BLD AUTO: 2.77 10E12/L (ref 4.4–5.9)
SODIUM SERPL-SCNC: 139 MMOL/L (ref 133–144)
WBC # BLD AUTO: 3 10E9/L (ref 4–11)

## 2017-11-06 PROCEDURE — 25000132 ZZH RX MED GY IP 250 OP 250 PS 637: Mod: GY | Performed by: STUDENT IN AN ORGANIZED HEALTH CARE EDUCATION/TRAINING PROGRAM

## 2017-11-06 PROCEDURE — 00000146 ZZHCL STATISTIC GLUCOSE BY METER IP

## 2017-11-06 PROCEDURE — C1751 CATH, INF, PER/CENT/MIDLINE: HCPCS

## 2017-11-06 PROCEDURE — 25000128 H RX IP 250 OP 636: Performed by: STUDENT IN AN ORGANIZED HEALTH CARE EDUCATION/TRAINING PROGRAM

## 2017-11-06 PROCEDURE — 36415 COLL VENOUS BLD VENIPUNCTURE: CPT | Performed by: STUDENT IN AN ORGANIZED HEALTH CARE EDUCATION/TRAINING PROGRAM

## 2017-11-06 PROCEDURE — 84100 ASSAY OF PHOSPHORUS: CPT | Performed by: STUDENT IN AN ORGANIZED HEALTH CARE EDUCATION/TRAINING PROGRAM

## 2017-11-06 PROCEDURE — 25000125 ZZHC RX 250: Performed by: PHYSICIAN ASSISTANT

## 2017-11-06 PROCEDURE — 25000132 ZZH RX MED GY IP 250 OP 250 PS 637: Mod: GY | Performed by: HOSPITALIST

## 2017-11-06 PROCEDURE — A9270 NON-COVERED ITEM OR SERVICE: HCPCS | Mod: GY | Performed by: PHYSICIAN ASSISTANT

## 2017-11-06 PROCEDURE — 36569 INSJ PICC 5 YR+ W/O IMAGING: CPT

## 2017-11-06 PROCEDURE — 80048 BASIC METABOLIC PNL TOTAL CA: CPT | Performed by: STUDENT IN AN ORGANIZED HEALTH CARE EDUCATION/TRAINING PROGRAM

## 2017-11-06 PROCEDURE — 99233 SBSQ HOSP IP/OBS HIGH 50: CPT | Performed by: HOSPITALIST

## 2017-11-06 PROCEDURE — 83735 ASSAY OF MAGNESIUM: CPT | Performed by: STUDENT IN AN ORGANIZED HEALTH CARE EDUCATION/TRAINING PROGRAM

## 2017-11-06 PROCEDURE — A9270 NON-COVERED ITEM OR SERVICE: HCPCS | Mod: GY | Performed by: STUDENT IN AN ORGANIZED HEALTH CARE EDUCATION/TRAINING PROGRAM

## 2017-11-06 PROCEDURE — 25000132 ZZH RX MED GY IP 250 OP 250 PS 637: Mod: GY | Performed by: PHYSICIAN ASSISTANT

## 2017-11-06 PROCEDURE — A9270 NON-COVERED ITEM OR SERVICE: HCPCS | Mod: GY | Performed by: HOSPITALIST

## 2017-11-06 PROCEDURE — 80076 HEPATIC FUNCTION PANEL: CPT | Performed by: STUDENT IN AN ORGANIZED HEALTH CARE EDUCATION/TRAINING PROGRAM

## 2017-11-06 PROCEDURE — 85027 COMPLETE CBC AUTOMATED: CPT | Performed by: STUDENT IN AN ORGANIZED HEALTH CARE EDUCATION/TRAINING PROGRAM

## 2017-11-06 RX ORDER — HEPARIN SODIUM,PORCINE 10 UNIT/ML
2-5 VIAL (ML) INTRAVENOUS
Status: DISCONTINUED | OUTPATIENT
Start: 2017-11-06 | End: 2017-11-06 | Stop reason: HOSPADM

## 2017-11-06 RX ORDER — ERTAPENEM 1 G/1
1 INJECTION, POWDER, LYOPHILIZED, FOR SOLUTION INTRAMUSCULAR; INTRAVENOUS EVERY 24 HOURS
Qty: 10 EACH
Start: 2017-11-06 | End: 2017-11-20

## 2017-11-06 RX ORDER — LACTOBACILLUS RHAMNOSUS GG 10B CELL
1 CAPSULE ORAL 2 TIMES DAILY
Qty: 28 CAPSULE | Refills: 0 | Status: SHIPPED | OUTPATIENT
Start: 2017-11-06 | End: 2017-11-20

## 2017-11-06 RX ORDER — LACTULOSE 10 G/15ML
30 SOLUTION ORAL
Status: DISCONTINUED | OUTPATIENT
Start: 2017-11-06 | End: 2017-11-06 | Stop reason: HOSPADM

## 2017-11-06 RX ORDER — LIDOCAINE 40 MG/G
CREAM TOPICAL
Status: DISCONTINUED | OUTPATIENT
Start: 2017-11-06 | End: 2017-11-06 | Stop reason: HOSPADM

## 2017-11-06 RX ORDER — ERTAPENEM 1 G/1
1 INJECTION, POWDER, LYOPHILIZED, FOR SOLUTION INTRAMUSCULAR; INTRAVENOUS EVERY 24 HOURS
Status: CANCELLED | OUTPATIENT
Start: 2017-11-08

## 2017-11-06 RX ORDER — OXYCODONE HYDROCHLORIDE 5 MG/1
5 TABLET ORAL
Qty: 18 TABLET | Refills: 0 | Status: ON HOLD | OUTPATIENT
Start: 2017-11-06 | End: 2017-11-17

## 2017-11-06 RX ADMIN — LACTULOSE 20 G: 20 SOLUTION ORAL at 06:47

## 2017-11-06 RX ADMIN — DEXTRAN 70, AND HYPROMELLOSE 2910 2 DROP: 1; 3 SOLUTION/ DROPS OPHTHALMIC at 07:45

## 2017-11-06 RX ADMIN — VITAMIN D, TAB 1000IU (100/BT) 2000 UNITS: 25 TAB at 07:44

## 2017-11-06 RX ADMIN — PRAVASTATIN SODIUM 20 MG: 20 TABLET ORAL at 07:44

## 2017-11-06 RX ADMIN — Medication 2 ML: at 13:55

## 2017-11-06 RX ADMIN — DEXTRAN 70, AND HYPROMELLOSE 2910 2 DROP: 1; 3 SOLUTION/ DROPS OPHTHALMIC at 12:08

## 2017-11-06 RX ADMIN — WHITE PETROLATUM: 1.75 OINTMENT TOPICAL at 14:13

## 2017-11-06 RX ADMIN — ERTAPENEM SODIUM 1 G: 1 INJECTION, POWDER, LYOPHILIZED, FOR SOLUTION INTRAMUSCULAR; INTRAVENOUS at 15:50

## 2017-11-06 RX ADMIN — RIFAXIMIN 550 MG: 550 TABLET ORAL at 07:45

## 2017-11-06 RX ADMIN — SPIRONOLACTONE 25 MG: 25 TABLET ORAL at 07:44

## 2017-11-06 RX ADMIN — CARVEDILOL 12.5 MG: 12.5 TABLET, FILM COATED ORAL at 07:44

## 2017-11-06 RX ADMIN — OMEPRAZOLE 40 MG: 20 CAPSULE, DELAYED RELEASE ORAL at 07:44

## 2017-11-06 RX ADMIN — POTASSIUM CHLORIDE 20 MEQ: 750 TABLET, EXTENDED RELEASE ORAL at 07:44

## 2017-11-06 RX ADMIN — LACTULOSE 30 G: 20 SOLUTION ORAL at 12:11

## 2017-11-06 RX ADMIN — CYANOCOBALAMIN TAB 1000 MCG 1000 MCG: 1000 TAB at 07:45

## 2017-11-06 RX ADMIN — WHITE PETROLATUM: 1.75 OINTMENT TOPICAL at 07:54

## 2017-11-06 RX ADMIN — Medication 1 CAPSULE: at 07:44

## 2017-11-06 ASSESSMENT — VISUAL ACUITY
OU: NORMAL ACUITY;BASELINE;GLASSES
OU: NORMAL ACUITY

## 2017-11-06 NOTE — PROGRESS NOTES
Care Coordinator Progress Note     Admission Date/Time:  11/2/2017  Attending MD:  Asiya Morgan MD     Data  Chart reviewed, discussed with interdisciplinary team. In 6A Discharge Rounds, LILLIAN Duran, reported pt will need IV Ertapenem every 24 hours x2 weeks. Pt to follow-up with Infectious Disease on 11-17-17. Will have PICC line placed today.   Medicine & Infectious Disease consulting.    Spoke with PLC and explained pt will be doing infusion as an outpt; only needs troubleshooting teaching.     Patient was admitted for:  Parotid mass  Procedure:  Right parotidectomy with facial nerve dissection; neurorrhaphy of zygomatic branch of facial nerve.   Past history includes:  Anemia; BPH; depressive disorder; GERD; hepatitis; hyperlipidemia; liver cirrhosis secondary to ESTRADA; diabetes.     Concerns with insurance coverage for discharge needs: None. Pt's insurance is Medicare & AARP.    Current Living Situation: Patient lives in an assisted living facility. [facility] in [place].   Support System: Supportive  Services Involved: Outpatient Infusion Services  Transportation: TBD  Barriers to Discharge: None    Coordination of Care and Referrals  Introduced myself to pt and explained I help with discharge planning and arranging outpt IV infusions. Pt agreeable to outpt infusion at the [facility]. He is aware of need for IV antibiotics. Pt said he drives and can get himself to outpt infusion center.   Pt said he lives at [facility]. He does his own meds and denies any problems.  Pt said he bought a house and plans to move into it in December or January. He bought the house because he thought his daughter was going to move in with him, but now she isn't.     Pt's primary is Dr Natalie Russell.     Called the Infusion Center at the [facility], [phone]. Scheduled pt for outpt infusion   11-07 at 4pm    11-08 at 5pm  11-09 at 4pm  11-10 at 3pm  11-11 at 2pm    Called the Infectious Disease  Clinic at [facility], [phone] and tried to schedule follow-up appt for 11-17-17. They do not have any openings. Explained pt will be completing IV antibiotics and will need to see ID around that date. The  was going to leave a message and they will contact the pt with an appt.              Assessment  Pt needing outpt IV antibiotics; does not have insurance coverage for home IV antibiotics.     Plan  Anticipated Discharge Date:  Today  Anticipated Discharge Plan:   Discharge home. Outpatient IV antibiotics daily at the [facility].       Genny Jo RN Care Coordinator  Unit [address], [facility]

## 2017-11-06 NOTE — PROGRESS NOTES
11/06/17 120     Lisa Ordonez-Registered Nurse (Nursing)  Patient attended Central line care class alone. Discussed troubleshooting guidelines and care for PICC. Observed Flushing and cap change. Pt. Plans to go into clinic for daily ABX infusion. Received written material related to all content taught.

## 2017-11-06 NOTE — PLAN OF CARE
Problem: Patient Care Overview  Goal: Plan of Care/Patient Progress Review  Outcome: No Change  POD#4 s/p right superficial parotidectomy and right facial nerve repair. VSS. No c/o pain. Neck incision intact with sutures; 1JP remains with scant amounts of serosang drainage. Pt educated on incisional and LUANNE cares and performing independently. Jaw braw in place with fluffs to provide pressure dressing. Adequate PO; pt rarely orders meals in room so carb coverage not administered per pt request; BS's remain high; educated on importance of carb counting etc. Voiding spontaneously. Total of 3 BM's today; goal of 5. Lactulose reordered at home dose. Up ad karely in room and hallways. PICC placed this evening; to received evening ABX dose prior to DC. Plans to DC this evening. Will continue to monitor and update MD's accordingly.

## 2017-11-06 NOTE — DISCHARGE SUMMARY
Discharge Summary  Frandy Workman  [id number]  1965    Date of Admission: 11/2/2017  Date of Discharge: 11/6/2017    Admission Diagnosis: Parotid tumor [D49.0]  Discharge Diagnosis: Parotid abscess    Procedures:  Date: 11/2/2017  Procedure(s):  Right parotidectomy with facial nerve dissection  Neurorrhaphy of zygomatic branch of facial nerve   Continuous facial nerve monitoring    Pathology: pending    HPI: Frandy Workman is a 52 year old male with history of a right parotid mass that had been present for approximately 3 months. FNA of the mass showed inflammatory cells without malignancy. He was treated with a course of antibiotics and underwent a repeat FNA which again was nondiagnostic. Given the high risk for malignancy based on imaging and presentation, it was recommended that he undergo operative intervention and the patient consented to the above procedure after detailed explanation of the risks and benefits of said procedure.    Hospital Course: The patient was admitted to the hospital and underwent the above mentioned procedure. He tolerated the procedure without any intra- or mikaela-operative complications. Please see the operative report for full details of the procedure. Intraoperative findings were most consistent with an abscess rather than a tumor. The patient was admitted for post-operative monitoring. His postoperative course was complicated by a right ulnar nerve palsy that resolved spontaneously. He also had multiple loose stools. Medicine was consulted for further evaluation given his home lactulose dose and history of liver failure. His lactulose was titrated to allow for 4-5 bowel movements per day. His blood glucose levels were elevated, however, this was presumed due to patient noncompliance with diabetic diet and holding of home oral diabetic mediation. He is to resume home medications and follow up with his primary care physician for ongoing diabetes management on discharge.  He was  started on IV antibiotics for the parotid abscess and infectious disease was consulted. Outpatient IV antibiotics were recommended. A PICC line was placed an the patient attended PICC line teaching at the [facility]. Daily outpatient transfusions of IV ertapenem were arranged. At discharge, the patient's pain was well controlled, the patient was voiding on his own, and was ambulating and tolerating a regular diet.     Discharge Exam:  Vitals:    11/05/17 2000 11/05/17 2240 11/06/17 0333 11/06/17 0747   BP: 145/59 114/57 92/49 123/63   BP Location: Left arm  Left arm    Pulse:  71 85 68   Resp: 16 16 16 16   Temp: 98.1  F (36.7  C) 97.8  F (36.6  C) 96.7  F (35.9  C) 97.8  F (36.6  C)   TempSrc:  Oral Oral Oral   SpO2: 100% 100% 100% 100%   Weight:       Height:         General: alert and oriented; sitting comfortably in bed; not in acute distress  HEENT:  - Extraocular muscles intact  - House-Brackmann V/VI on the right with incomplete eye closure  - Right facial/neck incision well-approximated without drainage or bleeding; ecchymoses of the surrounding skin improving; possible hematoma over right zygomatic arch that is softer and smaller than previous exam  - Neck is soft and flat, without fluctuance  - LUANNE drain x1 with serosanguinous output holding suction  Pulmonary: breathing comfortably on room air; no stridor    Discharge Medications:   Frandy Workman   Home Medication Instructions NIDHI:[id number]    Printed on:11/06/17 1421   Medication Information                      artificial tears OINT ophthalmic ointment  Place 1 g into the right eye At Bedtime             blood glucose monitoring (ACCU-CHEK EDINSON PLUS) test strip  Use to test blood sugar 4 times daily             blood glucose monitoring (ACCU-CHEK FASTCLIX) lancets  Use to test blood sugar 4 times daily or as directed.  1 box = 102 lancets             carvedilol (COREG) 12.5 MG tablet  Take 1 tablet (12.5 mg) by mouth 2 times daily  (with meals)             Cholecalciferol (VITAMIN D-3 PO)  Take 2,000 Units by mouth every morning              cyanocobalamin (RA VITAMIN B-12 TR) 1000 MCG TBCR  Take 1,000 mcg by mouth every morning              dapagliflozin (FARXIGA) 10 MG TABS tablet  Take 1 tablet (10 mg) by mouth daily             ertapenem (INVANZ) 1 GM vial  Inject 1 g into the vein every 24 hours for 14 days             hypromellose-dextran (ARTIFICAL TEARS) SOLN ophthalmic solution  Place 1 drop into the right eye every hour as needed for dry eyes Apply at least 4 times daily and as needed for dry eye             insulin aspart (NOVOLOG FLEXPEN) 100 UNIT/ML injection  1 unit per 4 Gms CHO at meals and snacks + correction scale of 1 unit per 25 mg/dL over 125. Average daily dose is 75 units.             insulin degludec (TRESIBA) 200 UNIT/ML pen  Take 120 units daily.             insulin pen needle (BD VIKTORIA U/F) 32G X 4 MM  Use as directed.             lactobacillus rhamnosus, GG, (CULTURELL) capsule  Take 1 capsule by mouth 2 times daily for 14 days             lactulose (CHRONULAC) 10 GM/15ML solution  Take 45 mLs (30 g) by mouth 4 times daily             Multiple Vitamin (THERAVITE PO)  Take 1 tablet by mouth every morning              OLANZapine (ZYPREXA) 2.5 MG tablet  Take 1 tablet (2.5 mg) by mouth At Bedtime             omeprazole (PRILOSEC) 40 MG capsule  Take 1 capsule (40 mg) by mouth daily             oxyCODONE IR (ROXICODONE) 5 MG tablet  Take 1 tablet (5 mg) by mouth every 3 hours as needed for moderate to severe pain             potassium chloride SA (K-DUR/KLOR-CON M) 20 MEQ CR tablet  Take 1 tablet (20 mEq) by mouth daily             pravastatin (PRAVACHOL) 10 MG tablet  Take 2 tablets (20 mg) by mouth daily             rifaximin (XIFAXAN) 550 MG TABS tablet  Take 1 tablet (550 mg) by mouth 2 times daily             spironolactone (ALDACTONE) 25 MG tablet  Take 1 tablet (25 mg) by mouth daily                   Discharge  "Procedure Orders  Infectious Disease Referral     Reason for your hospital stay   Order Comments: Post-operative care     Activity   Order Comments: Your activity upon discharge: No heavy lifting greater than 10 lbs and no strenuous exercise for 2 weeks or until follow up appointment. No driving while taking narcotic pain medications.   Order Specific Question Answer Comments   Is discharge order? Yes      When to contact your care team   Order Comments: Please notify your doctor if you experience wound breakdown, sustained bleeding from the wound site, or increasing redness, swelling, and/or purulent malorodorous discharge from the wound site which may indicate infection. If you feel it is acute, or experience sudden changes in breathing, chest pain, or excessive sleepiness/somnolence please return to the emergency department or call 911. If you have questions or concerns during the day please call ENT clinic and [phone]. If at night you can call [facility] at [phone] and ask for the \"ENT resident on call\".     Adult [facility] Follow-up and recommended labs and tests   Order Comments: Follow up in ENT clinic with Dr. Morgan on Friday, November 10th at 2:20PM. Please call the clinic with questions/concerns: [phone].    Otolaryngology/ENT Clinic:  [facility]  Clin[facility] & Surgery Center  [address] [place] [address]    Follow up in infectious disease clinic on November 17th. You should receive a phone call to schedule this appointment. If you do not hear from the clinic, please call: [phone]    [facility]  Clinics and Surgery Center  [address]oor [address]  [address] [place] [address]    Appointments on [place] and/or [facility] (with [facility] or [facility] provider or service). Call [phone] if you haven't heard regarding these appointments within 7 days of discharge.     Wound care and dressings   Order Comments: Instructions " to care for your wound at home: Keep incisions clean and dry. Apply Aquaphor ointment to incisions three times daily to keep moist. You may shower, do not soak, scrub, or submerge incisions under water. If you have a surgical drain, do not get the drain site wet until 24 hours after the drain has been removed.    Right eye care: Apply artificial tears drops to right eye 4 times daily and as needed to keep eye moist. Apply artificial tears ointment to right eye at bedtime. Apply humidity chamber (clear eye patch) over right eye at bedtime and wear when sleeping to protect the eye.     Full Code     Diet   Order Comments: Follow this diet upon discharge: Resume pre-procedure diet   Order Specific Question Answer Comments   Is discharge order? Yes          Dispo: To home in good condition. All of the patient's questions/concerns have been addressed at this time.     Kera Dillon PA-C  Otolaryngology-Head & Neck Surgery  Please contact ENT by dialing * [phone] and entering job code [phone].

## 2017-11-06 NOTE — PROGRESS NOTES
"Otolaryngology Progress Note  November 6, 2017    S: No acute events overnight. Patient's right-sided facial pain is well-controlled. Patient's bowel movements have been more regular since being managed by Internal Medicine. Patient's right ulnar neuropathy is resolved. Patient is tolerating a regular diet    O: /63  Pulse 68  Temp 97.8  F (36.6  C) (Oral)  Resp 16  Ht 1.75 m (5' 8.9\")  Wt 86.2 kg (190 lb 0.6 oz)  SpO2 100%  BMI 28.15 kg/m2  General: alert and oriented; sitting comfortably in bed; not in acute distress  HEENT:  - Extraocular muscles intact  - House-Brackmann V/VI on the right with incomplete eye closure  - Right facial/neck incision well-approximated without drainage or bleeding; ecchymoses of the surrounding skin improving; possible hematoma over right zygomatic arch that is softer and smaller than previous exam  - Neck is soft and flat, without fluctuance  - LUANNE drain x1 with serosanguinous output holding suction  Pulmonary: breathing comfortably on room air; no stridor      Intake/Output Summary (Last 24 hours) at 11/06/17 0948  Last data filed at 11/06/17 0800   Gross per 24 hour   Intake              830 ml   Output             34.5 ml   Net            795.5 ml       LUANNE drain output(s): (last 24 hours)/(last shift)  Right neck: 10, 12, 10 = 32cc    LABS:  ROUTINE IP LABS (Last four results)  BMP    Recent Labs  Lab 11/05/17  1000 11/04/17  0745 11/03/17  0702 11/02/17  1656    142 143 143   POTASSIUM 4.7 4.0 4.5 3.8   CHLORIDE 111* 112* 113* 112*   DEBORAH 8.9 9.1 8.7 8.5   CO2 23 23 24 26   BUN 20 19 19 18   CR 0.94 0.91 1.06 0.96   * 158* 294* 110*     CBC    Recent Labs  Lab 11/05/17  1000 11/04/17  0745 11/03/17  0702 11/02/17  1656   WBC 4.8 4.7 4.1 5.6   RBC 2.83* 3.03* 2.81* 2.92*   HGB 9.8* 10.5* 9.6* 10.1*   HCT 29.6* 31.8* 29.9* 30.9*   * 105* 106* 106*   MCH 34.6* 34.7* 34.2* 34.6*   MCHC 33.1 33.0 32.1 32.7   RDW 14.0 14.1 14.4 14.4   PLT 50* 42* 43* 38* "     INR    Recent Labs  Lab 11/02/17  0621   INR 1.18*       Surgical cytology (11/2/17)-  - Right parotid mass: acute inflammation and debris    A/P: Frandy Workman is a 52 year old male with a past medical history of liver cirrhosis 2/2 ESTRADA, DM-2, HLD, GERD, depression, BPH, chronic anemia, and a right parotid mass who is now POD#4 s/p right superficial parotidectomy and right facial nerve repair. Parotid mass found to be consistent with abscess, all preliminary pathology negative for malignancy.     Neuro:  - Pain control-   - Oxycodone 5mg po q3h PRN   - No Tylenol given liver cirrhosis  - PTA Zyprexa 2.5mg po qhs  - Right ulnar palsy resolved- no need for f/u EMG    HEENT:  - Incision cares: clean with 0.9% sodium chloride and apply Aquaphor Q8H   - Monitor and record LUANNE drain output Qshift- plan to DC drain today  - Jaw bra to be worn at all times with gauze fluffs over right parotid defect  - Right facial nerve paresis:   - Eye care: artificial tears drops qid and PRN, artificial tears ointment qhs, humidity chamber eye patch over right eye qhs  - NO MASSAGES TO PAROTID- high risk of bleeding    Respiratory:  - Saturating well on room air  - Supplemental O2 PRN to keep sats >92%    CV/heme:  - Hemodynamically stable- pulse and BP within normal limits  - Hx HTN: resume PTA carvedilol 12.5mg po BID, spironolactone 25mg po qAM  - Hx HLD: PTA pravastatin 20mg po qd  - Holding PTA ASA 81mg    FEN/GI:  - Regular diet  - Cholecalciferol 2000u qAM; vitamin B-12 1000mcg po qAM  - Lactobacillus probiotic po BID  - Bowel regimen: holding  - Hx liver cirrhosis on PTA lactulose qid, rifaximin 550mg po BID- Medicine consulted   - Lactulose 30g TID with a goal of 4-5 BMs/day   - Continue PTA rifaximin and spironolactone  - C. Diff PCR negative  - F/u Giardia antigen testing, pending  - Omeprazole 40mg po qd    :  - Voiding independently    Endo  - Hx TIIDM- Medicine consulted   - PTA Farxiga 10 mg qd, tresiba 110 units  qd, Novolog 1 units per 4 gm carbs and SSI 1 unit per 25 mg/dL   - High blood glucose recordings postoperatively   - Insulin degludec 90u qd   - Continue mealtime insulin w/ carb counting   - Correctional insulin   - Hold home farxiga   - Discharge plan- resume home regimen    ID:  - Afebrile; no leukocytosis on 11/5  - 11/2 culture- Strep anginosus, moderate growth, pan-sensitive  - Right parotid abscess- Infectious Disease consulted   - Ertapenem 1g IV qd   - Follow-up with Dr. Noel on 11/17/17 (Infectious Disease referral placed)   - PICC for outpatient antibiotics (order placed)    PPX:  - SCDs  - IS    Disposition Plan   Expected discharge today to prior living arrangement once PICC line is placed.     Entered: Daniella Waller 11/06/2017, 9:41 AM       -- Patient and plan were discussed with staff, Dr. Eddie Waller MD- PGY-1  Otolaryngology- Head and Neck Surgery  Pager: [phone]  Please contact ENT with questions by dialing [phone] and entering job code [phone] when prompted.

## 2017-11-06 NOTE — PROGRESS NOTES
Pt given dose of IV ertapenem before d/c. No other medications given. Pt has L single lumen PICC and will continue antibiotic infusions at infusion center. Discharge paperwork reviewed by day shift. Pt's car was parked in the parking garage. Pt drove himself home. Discharged around 1650.

## 2017-11-06 NOTE — PLAN OF CARE
Problem: Patient Care Overview  Goal: Plan of Care/Patient Progress Review  Pt is now AxOx4. VSS. Pt was Ax1 only to self at the beginning of the shift. Pt had refused his morning and afternoon Lactulose and took one dose eventually on AM shift. PM RN gave him 2 prn doses and 1 scheduled dose this shift. Pt became AxOx4 after the first prn and even more after the scheduled dose. Pt wanted the second prn dose before bed due to him taking a higher dose of lactulose at home. Pt does not remember going to the gift shop on AM shift. Pt gave the chocolate he bought to the staff this evening due to him being a diabetic. BS this evening were 300 and 216. His 300 sugar was not given sliding scale due to him having just eaten. Pt forgot his education to call for a check before eating. Carb count given. Sliding scale given for the 216. PICC to be placed tomorrow AM before education center goes over his teachings at 1100. (11/6/17)

## 2017-11-06 NOTE — DISCHARGE INSTRUCTIONS
Daily Outpatient IV Infusions:     Location:    [facility] ([facility])   [address] [place] [address]    Phone:  [phone]    The Outpatient Infusion Center will flush the PICC line and change the dressing per their protocol.

## 2017-11-06 NOTE — PROGRESS NOTES
[facility] [place]  Internal Medicine - Gold Service  Consult Note    Today's Changes/Recommendations  1) Changed lactulose to 30g TID   2) Increased insulin degludec to 90 units daily (home dose is 110 units daily)  3) Resume home insulin regimen at discharge      Assessment & Plan   Frandy Workman is a 52 year old male admitted on 11/2/2017. He has a history of ESTRADA cirrhosis c/b ascites/HE, insulin-dependent type 2 DM, and was admitted to the ENT service following right superficial parotidectomy w/ right facial nerve repair. The resected mass was consistent with abscess (prelim path negative for malignancy).       Right parotid mass s/p right superficial parotidectomy w/ facial nerve repair:  Appearance most consistent with abscess. Prelim path negative for malignancy. Initial cultures growing strep anginosus.   - post op management per ENT  - PICC line placement today  - ertapenem daily, continued at discharge  - ID Clinic follow up on 11/17 with Dr. Noel to discuss duration/IV vs PO options    ESTRADA cirrhosis, compensated  History of ascites, HE, and EV with banding. There have been some reports of confusion during this hospital stay, but currently there is none. He is appropriate, linear, and able to hold sophisticated conversation about his lactulose and insulin plans. Cirrhosis remains compensated. Current goal 4-5 bowel movements a day. Acute diarrhea seems to have resolved.   - lactulose TID; changed to 30g dosing  - goal 4-5 BM daily   - continue home rifaximin, spironolactone     Type 2 DM, insulin-dependent  PTA on Farxiga 10 mg daily, tresiba 110 units daily, Novolog 1 units per 4 gm carbs and SSI 1 unit per 25 mg/dL glucose >125. High sugars all day, PO intake increased. Will increase insulin to be closer to home regimen.   - increase insulin degludec to 90 units daily   - continue mealtime insulin w/ carb counting (1 unit per 4g)   - correctional insulin   - hold home  "farxiga while inpt  - resume home regimen at discharge    Acute, non-inflammatory, non-bloody diarrhea  Watery stools started after admission. Most likely due to antibiotics. C Diff negative. No further work-up indicated at this time. Seems to have resolved.   - monitor stool output  - titrate lactulose to 4-5 BM daily as above      Hypertension, chronic: home aldactone, carvedilol   GERD: home omeprazole  Depression/bipolar disorder: Home zyprexa.         Diet: Regular Diet Adult  Fluids: None  DVT Prophylaxis: Low Risk/Ambulatory with no VTE prophylaxis indicated  Code Status: Full Code    Disposition Plan   Expected discharge: today vs tomorrow, recommended to prior living arrangement once PICC Line placed.     Entered: Americo Leon 11/06/2017, 7:33 AM   Information in the above section will display in the discharge planner report.      The patient's care was discussed with the Dr. Morgan, the patient, and the bedside RN.    Americo Leon  Internal Medicine Staff Hospitalist Service  [facility]  Pager: [phone]  Please see sticky note for cross cover information    Interval History   Miller feeling frustrated by the discrepancy between his home lactulose regimen and what he receives here. Says that he gets \"45\" three times a day [meaning 45 mL] and that we are only giving him \"20\" [20 g].     Otherwise, he is feeling fine and looking forward to leaving the hospital. His lower abdominal pain is improved, but still noticeable when he sits up.     A 4-point ROS is negative other than what is stated above.    Data reviewed today: I reviewed all medications, new labs and imaging results over the last 24 hours. I personally reviewed no images or EKG's today.    Physical Exam   Vital Signs: Temp: 96.7  F (35.9  C) Temp src: Oral BP: 92/49 Pulse: 85 Heart Rate: 79 Resp: 16 SpO2: 100 % O2 Device: None (Room air)    Weight: 190 lbs .58 oz  General Appearance: NAD. Comfortable.  Respiratory: " Nonlabored. Lungs clear bilaterally.   Cardiovascular: RRR. No extra heart sounds.   GI: Soft. Mild TTP in the lower quadrants.   Neuro: Alert. Conversant.   Psych: Appropriate mood.

## 2017-11-07 ENCOUNTER — INFUSION THERAPY VISIT (OUTPATIENT)
Dept: INFUSION THERAPY | Facility: CLINIC | Age: 52
DRG: 134 | End: 2017-11-07
Attending: PHYSICIAN ASSISTANT
Payer: MEDICARE

## 2017-11-07 VITALS
TEMPERATURE: 98 F | HEART RATE: 85 BPM | SYSTOLIC BLOOD PRESSURE: 116 MMHG | RESPIRATION RATE: 16 BRPM | OXYGEN SATURATION: 100 % | DIASTOLIC BLOOD PRESSURE: 57 MMHG

## 2017-11-07 DIAGNOSIS — K11.3 PAROTID ABSCESS: Primary | ICD-10-CM

## 2017-11-07 LAB — COPATH REPORT: NORMAL

## 2017-11-07 PROCEDURE — 25000128 H RX IP 250 OP 636: Mod: ZF | Performed by: PHYSICIAN ASSISTANT

## 2017-11-07 RX ORDER — ERTAPENEM 1 G/1
1 INJECTION, POWDER, LYOPHILIZED, FOR SOLUTION INTRAMUSCULAR; INTRAVENOUS EVERY 24 HOURS
Status: CANCELLED | OUTPATIENT
Start: 2017-11-08

## 2017-11-07 RX ADMIN — SODIUM CHLORIDE 1 G: 9 INJECTION, SOLUTION INTRAVENOUS at 16:14

## 2017-11-07 NOTE — MR AVS SNAPSHOT
After Visit Summary   11/7/2017    Frandy Workman    MRN: [id number]           Patient Information     Date Of Birth          1965        Visit Information        Provider Department      11/7/2017 4:00 PM UC 46 ATC; UC SPEC INFUSION [facility]        Today's Diagnoses     Parotid abscess    -  1      Care Instructions    Dear Frandy Workman    Thank you for choosing [facility] ([facility]) for your infusion.  The following information is a summary of our appointment as well as important reminders.        We look forward in seeing you on your next appointment here at [facility].  Please don t hesitate to call us at [phone] to reschedule any of your appointments or to speak with one of the [facility] registered nurses.  It was a pleasure taking care of you today.    Sincerely,    [facility]  Specialty Infusion [facility] Procedure Center  [address] [place] [address]  [id number]  Phone:  [phone]            Follow-ups after your visit        Your next 10 appointments already scheduled     Nov 08, 2017  5:00 PM CST   Infusion 60 with UC SPEC INFUSION, UC 47 ATC   [facility] Procedure ([facility])    [address] [address] [phone]            Nov 09, 2017  4:00 PM CST   Infusion 60 with UC SPEC INFUSION, UC 50 ATC   [facility] ([facility])    [address] [id number] [phone]            Nov 10, 2017  2:40 PM CST   (Arrive by 2:25 PM)   RETURN TUMOR VISIT with Asiya Morgan MD   [facility] ([facility])    [address] [place] [address] [phone]            Nov 10, 2017  3:00 PM  CST   Infusion 60 with UC SPEC INFUSION, UC 51 ATC   [facility] ([facility])    [address] [facility]  [address] [facility] [id number] [phone]            Nov 11, 2017  2:00 PM CST   Infusion 60 with UC SPEC INFUSION, UC 44 ATC   [facility] ([facility])    [address] [facility]  [address] [facility] [id number] [phone]            Nov 12, 2017  1:00 PM CST   Infusion 60 with UC SPEC INFUSION   [facility] ([facility])    [address] [facility]  [address] [facility] [address] [phone]              Who to contact     If you have questions or need follow up information about today's clinic visit or your schedule please contact [facility] SPECIAL[facility] PROCEDURE directly at [phone].  Normal or non-critical lab and imaging results will be communicated to you by [employer], letter or phone within 4 business days after the clinic has received the results. If you do not hear from us within 7 days, please contact the clinic through [employer]t or phone. If you have a critical or abnormal lab result, we will notify you by phone as soon as possible.  Submit refill requests through [employer] or call your pharmacy and they will forward the refill request to us. Please allow 3 business days for your refill to be completed.          Additional Information About Your Visit        [employer] Information     [employer] gives you secure access to your electronic health record. If you see a primary care provider, you can also send messages to your care team and make appointments. If you have questions, please call your primary care clinic.  If you do not have a primary care provider, please call [phone] and they will assist you.        Care EveryWhere ID     This is your  Care EveryWhere ID. This could be used by other organizations to access your [place] medical records  [id number]        Your Vitals Were     Pulse Temperature Respirations Pulse Oximetry          85 98  F (36.7  C) (Oral) 16 100%         Blood Pressure from Last 3 Encounters:   11/07/17 116/57   11/06/17 123/63   10/18/17 136/74    Weight from Last 3 Encounters:   11/02/17 86.2 kg (190 lb 0.6 oz)   10/18/17 86.2 kg (190 lb)   10/18/17 86.5 kg (190 lb 11.2 oz)              Today, you had the following     No orders found for display         Today's Medication Changes          These changes are accurate as of: 11/7/17  4:57 PM.  If you have any questions, ask your nurse or doctor.               These medicines have changed or have updated prescriptions.        Dose/Directions    dapagliflozin 10 MG Tabs tablet   Commonly known as:  FARXIGA   This may have changed:  when to take this   Used for:  Type 2 diabetes mellitus with hyperglycemia, with long-term current use of insulin (H)        Dose:  10 mg   Take 1 tablet (10 mg) by mouth daily   Quantity:  90 tablet   Refills:  3       insulin degludec 200 UNIT/ML pen   Commonly known as:  TRESIBA   This may have changed:    - how much to take  - how to take this  - when to take this  - additional instructions   Used for:  Type 2 diabetes mellitus with hyperglycemia, with long-term current use of insulin (H)        Take 120 units daily.   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 (H)        Dose:  30 g   Take 45 mLs (30 g) by mouth 4 times daily   Quantity:  7200 mL   Refills:  11       omeprazole 40 MG capsule   Commonly known as:  priLOSEC   This may have changed:  when to take this   Used for:  Gastroesophageal reflux disease, esophagitis presence not specified        Dose:  40 mg   Take 1  capsule (40 mg) by mouth daily   Quantity:  90 capsule   Refills:  2       potassium chloride SA 20 MEQ CR tablet   Commonly known as:  K-DUR/KLOR-CON M   This may have changed:  when to take this   Used for:  Hypokalemia        Dose:  20 mEq   Take 1 tablet (20 mEq) by mouth daily   Quantity:  90 tablet   Refills:  3       pravastatin 10 MG tablet   Commonly known as:  PRAVACHOL   This may have changed:    - how much to take  - when to take this   Used for:  Hyperlipidemia LDL goal <100        Dose:  20 mg   Take 2 tablets (20 mg) by mouth daily   Quantity:  90 tablet   Refills:  3       spironolactone 25 MG tablet   Commonly known as:  ALDACTONE   This may have changed:  when to take this   Used for:  Other ascites        Dose:  25 mg   Take 1 tablet (25 mg) by mouth daily   Quantity:  90 tablet   Refills:  1                Primary Care Provider Office Phone # Fax #    Natalie Mitzi Russell [phone]       [address] [place] [address] [address] [place] [address]        Equal Access to Services     MINDI RODRIGUEZ : Hadii curly ku hadasho Soomaali, waaxda luqadaha, qaybta kaalmada adeegyada, emy mcdonald . So [facility] [phone].    ATENCIÓN: Si habla español, tiene a hanley disposición servicios gratuitos de asistencia lingüística. [facility] [phone].    We comply with applicable federal civil rights laws and [place] laws. We do not discriminate on the basis of race, color, national origin, age, disability, sex, sexual orientation, or gender identity.            Thank you!     Thank you for choosing [facility]  for your care. Our goal is always to provide you with excellent care. Hearing back from our patients is one way we can continue to improve our services. Please take a few minutes to complete the written survey that you may receive in the mail after your visit with us. Thank you!             Your Updated Medication List -  Protect others around you: Learn how to safely use, store and throw away your medicines at www.disposemymeds.org.          This list is accurate as of: 11/7/17  4:57 PM.  Always use your most recent med list.                   Brand Name Dispense Instructions for use Diagnosis    artificial tears Oint ophthalmic ointment     5 g    Place 1 g into the right eye At Bedtime    Dry eyes       blood glucose monitoring lancets     408 each    Use to test blood sugar 4 times daily or as directed.  1 box = 102 lancets    Type II diabetes mellitus (H)       blood glucose monitoring test strip    ACCU-CHEK EDINSON PLUS    400 each    Use to test blood sugar 4 times daily    Type II diabetes mellitus (H)       carvedilol 12.5 MG tablet    COREG    180 tablet    Take 1 tablet (12.5 mg) by mouth 2 times daily (with meals)    Liver cirrhosis secondary to ESTRADA (H), Secondary esophageal varices without bleeding (H)       dapagliflozin 10 MG Tabs tablet    FARXIGA    90 tablet    Take 1 tablet (10 mg) by mouth daily    Type 2 diabetes mellitus with hyperglycemia, with long-term current use of insulin (H)       ertapenem 1 GM vial    INVanz    10 each    Inject 1 g into the vein every 24 hours for 14 days    Parotid abscess       hypromellose-dextran Soln ophthalmic solution     1 Bottle    Place 1 drop into the right eye every hour as needed for dry eyes Apply at least 4 times daily and as needed for dry eye    Dry eyes       insulin aspart 100 UNIT/ML injection    NovoLOG FLEXPEN    24 mL    1 unit per 4 Gms CHO at meals and snacks + correction scale of 1 unit per 25 mg/dL over 125. Average daily dose is 75 units.    Type II diabetes mellitus (H)       insulin degludec 200 UNIT/ML pen    TRESIBA    36 mL    Take 120 units daily.    Type 2 diabetes mellitus with hyperglycemia, with long-term current use of insulin (H)       insulin pen needle 32G X 4 MM    BD VIKTORIA U/F    100 each    Use as directed.    Type II diabetes mellitus (H)        lactobacillus rhamnosus (GG) capsule     28 capsule    Take 1 capsule by mouth 2 times daily for 14 days    Long term (current) use of antibiotics       lactulose 10 GM/15ML solution    CHRONULAC    7200 mL    Take 45 mLs (30 g) by mouth 4 times daily    Liver cirrhosis secondary to ESTRADA (H)       OLANZapine 2.5 MG tablet    zyPREXA    30 tablet    Take 1 tablet (2.5 mg) by mouth At Bedtime    Insomnia, unspecified type       omeprazole 40 MG capsule    priLOSEC    90 capsule    Take 1 capsule (40 mg) by mouth daily    Gastroesophageal reflux disease, esophagitis presence not specified       oxyCODONE IR 5 MG tablet    ROXICODONE    18 tablet    Take 1 tablet (5 mg) by mouth every 3 hours as needed for moderate to severe pain    Acute post-operative pain       potassium chloride SA 20 MEQ CR tablet    K-DUR/KLOR-CON M    90 tablet    Take 1 tablet (20 mEq) by mouth daily    Hypokalemia       pravastatin 10 MG tablet    PRAVACHOL    90 tablet    Take 2 tablets (20 mg) by mouth daily    Hyperlipidemia LDL goal <100       RA VITAMIN B-12 TR 1000 MCG Tbcr   Generic drug:  cyanocobalamin      Take 1,000 mcg by mouth every morning        rifaximin 550 MG Tabs tablet    XIFAXAN    60 tablet    Take 1 tablet (550 mg) by mouth 2 times daily    Hepatic encephalopathy (H)       spironolactone 25 MG tablet    ALDACTONE    90 tablet    Take 1 tablet (25 mg) by mouth daily    Other ascites       THERAVITE PO      Take 1 tablet by mouth every morning        VITAMIN D-3 PO      Take 2,000 Units by mouth every morning

## 2017-11-07 NOTE — PROGRESS NOTES
Nursing Note  Frandy Workman [facility] for:   Chief Complaint   Patient presents with     Infusion     Invanz infusion      During today's [facility] appointment, orders from Kera Dillon PA-C were completed.  Frequency: today is dose 1 of 14 total.    Progress note:  Patient identification verified by name and date of birth.  Assessment completed.  Vitals recorded in Doc Flowsheets.  Patient was provided with education regarding infusion and possible side effects.  Patient verbalized understanding.      needed: No  Premedications: were not ordered.  Infusion Rates: infusion given over approximately 30 min.  Approximate Infusion length:30 minutes.   Labs: were not ordered for this appointment.  Vascular access: PICC accessed today.  Treatment Conditions: non-applicable.  Patient tolerated infusion: well.        Discharge Plan:   Follow up plan of care with: ongoing infusions at [facility].  Discharge instructions were reviewed with patient.  Patient/representative verbalized understanding of discharge instructions and all questions answered.  Patient discharged from [facility] in stable condition.    Bing Reddy RN    Administrations This Visit     ertapenem (INVanz) 1 g in NaCl 0.9 % 100 mL intermittent infusion     Admin Date Action Dose Rate Route Administered By          11/07/2017 New Bag 1 g 240 mL/hr Intravenous Bing Reddy RN                         /60  Pulse 81  Temp 98  F (36.7  C) (Oral)  Resp 16  SpO2 100%

## 2017-11-08 ENCOUNTER — TELEPHONE (OUTPATIENT)
Dept: GASTROENTEROLOGY | Facility: CLINIC | Age: 52
End: 2017-11-08

## 2017-11-08 ENCOUNTER — INFUSION THERAPY VISIT (OUTPATIENT)
Dept: INFUSION THERAPY | Facility: CLINIC | Age: 52
End: 2017-11-08
Attending: PHYSICIAN ASSISTANT
Payer: MEDICARE

## 2017-11-08 VITALS
DIASTOLIC BLOOD PRESSURE: 54 MMHG | OXYGEN SATURATION: 100 % | TEMPERATURE: 98.8 F | SYSTOLIC BLOOD PRESSURE: 110 MMHG | HEART RATE: 80 BPM | RESPIRATION RATE: 18 BRPM

## 2017-11-08 DIAGNOSIS — K11.3 PAROTID ABSCESS: Primary | ICD-10-CM

## 2017-11-08 PROCEDURE — 96365 THER/PROPH/DIAG IV INF INIT: CPT

## 2017-11-08 PROCEDURE — 25000128 H RX IP 250 OP 636: Mod: ZF | Performed by: PHYSICIAN ASSISTANT

## 2017-11-08 RX ORDER — ERTAPENEM 1 G/1
1 INJECTION, POWDER, LYOPHILIZED, FOR SOLUTION INTRAMUSCULAR; INTRAVENOUS EVERY 24 HOURS
Status: CANCELLED | OUTPATIENT
Start: 2017-11-09

## 2017-11-08 RX ADMIN — SODIUM CHLORIDE 1 G: 9 INJECTION, SOLUTION INTRAVENOUS at 15:26

## 2017-11-08 NOTE — PATIENT INSTRUCTIONS
Dear Frandy Workman    Thank you for choosing [facility] ([facility]) for your infusion.  The following information is a summary of our appointment as well as important reminders.      Additional information: You received Invanz 1 gram today.     We look forward in seeing you on your next appointment here at [facility].  Please don t hesitate to call us at [phone] to reschedule any of your appointments or to speak with one of the [facility] registered nurses.  It was a pleasure taking care of you today.    Sincerely,    [facility]  [address] [place] [address]  [address]  Phone:  [phone]

## 2017-11-08 NOTE — TELEPHONE ENCOUNTER
Patient scheduled for EGD    Indication for procedure. Other cirrhosis of liver    Referring Provider. Santi Rosas MD    ? Not Needed    Arrival time verified? Yes, 0840    Facility location verified? Yes, [address]    Instructions given regarding prep and procedure    Prep Type NPO    Are you taking any anticoagulants or blood thinners? No    Instructions given? N/A    Electronic implanted devices? No    Pre procedure teaching completed? Yes    Transportation from procedure? Friend    H&P / Pre op physical completed? N/A

## 2017-11-08 NOTE — MR AVS SNAPSHOT
After Visit Summary   11/8/2017    Frandy Workman    MRN: [id number]           Patient Information     Date Of Birth          1965        Visit Information        Provider Department      11/8/2017 5:00 PM UC 47 ATC; UC SPEC INFUSION [facility] Procedure        Today's Diagnoses     Parotid abscess    -  1      Care Instructions    Dear Frandy Workman    Thank you for choosing [facility] ([facility]) for your infusion.  The following information is a summary of our appointment as well as important reminders.      Additional information: You received Invanz 1 gram today.     We look forward in seeing you on your next appointment here at [facility].  Please don t hesitate to call us at [phone] to reschedule any of your appointments or to speak with one of the [facility] registered nurses.  It was a pleasure taking care of you today.    Sincerely,    [facility]  [address] [place] [address]  [address]  Phone:  [phone]          Follow-ups after your visit        Your next 10 appointments already scheduled     Nov 09, 2017  4:00 PM CST   Infusion 60 with UC SPEC INFUSION, UC 50 ATC   [facility] Specialty [facility] Procedure ([facility])    [address] [place] [address]  2nd [facility] [address] [phone]            Nov 10, 2017  2:40 PM CST   (Arrive by 2:25 PM)   RETURN TUMOR VISIT with Asiya Morgan MD   [facility] ([facility])    [address] [place] [address]  4th [facility] [address] [phone]            Nov 10, 2017  3:00 PM CST   Infusion 60 with UC SPEC INFUSION, UC 51 ATC   [facility] Special[facility] Procedure ([facility])    [address] [place] [address]  [address]   [phone]            Nov 11, 2017  2:00 PM CST   Infusion 60 with UC SPEC INFUSION, UC 44 ATC   [facility] Specialty and Procedure ([facility])    [address] [place] [address] [phone]            Nov 12, 2017  1:00 PM CST   Infusion 60 with UC SPEC INFUSION, UC 49 ATC   [facility] Special[facility] Procedure ([facility])    [address] [place] [address] [phone]            Nov 13, 2017  4:00 PM CST   Infusion 60 with UC SPEC INFUSION   [facility] Special[facility] Procedure ([facility])    [address] [place] [id number] [phone]              Who to contact     If you have questions or need follow up information about today's clinic visit or your schedule please contact [facility] SPECIAL[facility] PROCEDURE directly at [phone].  Normal or non-critical lab and imaging results will be communicated to you by [facility], letter or phone within 4 business days after the clinic has received the results. If you do not hear from us within 7 days, please contact the clinic through [email or web address] or phone. If you have a critical or abnormal lab result, we will notify you by phone as soon as possible.  Submit refill requests through [email or web address] or call your pharmacy and they will forward the refill request to us. Please allow 3 business days for your refill to be completed.          Additional Information About Your Visit        [facility] Information     [email or web address] gives you secure access to your electronic health record. If you see a primary care provider, you can also send messages to your care team and make appointments. If you have questions, please call your primary care clinic.  If you do not have a primary care provider, please call [phone] and they will  assist you.        Care EveryWhere ID     This is your Care EveryWhere ID. This could be used by other organizations to access your [facility] medical records  [id number]        Your Vitals Were     Pulse Temperature Respirations Pulse Oximetry          80 98.8  F (37.1  C) 18 100%         Blood Pressure from Last 3 Encounters:   11/08/17 110/54   11/07/17 116/57   11/06/17 123/63    Weight from Last 3 Encounters:   11/02/17 86.2 kg (190 lb 0.6 oz)   10/18/17 86.2 kg (190 lb)   10/18/17 86.5 kg (190 lb 11.2 oz)              Today, you had the following     No orders found for display         Today's Medication Changes          These changes are accurate as of: 11/8/17  5:07 PM.  If you have any questions, ask your nurse or doctor.               These medicines have changed or have updated prescriptions.        Dose/Directions    dapagliflozin 10 MG Tabs tablet   Commonly known as:  FARXIGA   This may have changed:  when to take this   Used for:  Type 2 diabetes mellitus with hyperglycemia, with long-term current use of insulin (H)        Dose:  10 mg   Take 1 tablet (10 mg) by mouth daily   Quantity:  90 tablet   Refills:  3       insulin degludec 200 UNIT/ML pen   Commonly known as:  TRESIBA   This may have changed:    - how much to take  - how to take this  - when to take this  - additional instructions   Used for:  Type 2 diabetes mellitus with hyperglycemia, with long-term current use of insulin (H)        Take 120 units daily.   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 (H)        Dose:  30 g   Take 45 mLs (30 g) by mouth 4 times daily   Quantity:  7200 mL   Refills:  11       omeprazole 40 MG capsule   Commonly known as:  priLOSEC   This may have changed:  when to take this   Used for:  Gastroesophageal reflux disease, esophagitis  presence not specified        Dose:  40 mg   Take 1 capsule (40 mg) by mouth daily   Quantity:  90 capsule   Refills:  2       potassium chloride SA 20 MEQ CR tablet   Commonly known as:  K-DUR/KLOR-CON M   This may have changed:  when to take this   Used for:  Hypokalemia        Dose:  20 mEq   Take 1 tablet (20 mEq) by mouth daily   Quantity:  90 tablet   Refills:  3       pravastatin 10 MG tablet   Commonly known as:  PRAVACHOL   This may have changed:    - how much to take  - when to take this   Used for:  Hyperlipidemia LDL goal <100        Dose:  20 mg   Take 2 tablets (20 mg) by mouth daily   Quantity:  90 tablet   Refills:  3       spironolactone 25 MG tablet   Commonly known as:  ALDACTONE   This may have changed:  when to take this   Used for:  Other ascites        Dose:  25 mg   Take 1 tablet (25 mg) by mouth daily   Quantity:  90 tablet   Refills:  1                Primary Care Provider Office Phone # Fax #    Natalie Mitzi Russell [phone]       [address] [place] [address] [address] [place] [address]        Equal Access to Services     MAYCOL RODRIGUEZ : Hadii aad ku hadasho Somichael, waaxda luqadaha, qaybta kaalmada ademckay, emy mcdonald . So [facility] [phone].    ATENCIÓN: Si habla español, tiene a hanley disposición servicios gratuitos de asistencia lingüística. [facility] [phone].    We comply with applicable federal civil rights laws and [place] laws. We do not discriminate on the basis of race, color, national origin, age, disability, sex, sexual orientation, or gender identity.            Thank you!     Thank you for choosing [facility] SPECIAL[facility] PROCEDURE  for your care. Our goal is always to provide you with excellent care. Hearing back from our patients is one way we can continue to improve our services. Please take a few minutes to complete the written survey that you may receive in the mail after your visit with us.  Thank you!             Your Updated Medication List - Protect others around you: Learn how to safely use, store and throw away your medicines at www.disposemymeds.org.          This list is accurate as of: 11/8/17  5:07 PM.  Always use your most recent med list.                   Brand Name Dispense Instructions for use Diagnosis    artificial tears Oint ophthalmic ointment     5 g    Place 1 g into the right eye At Bedtime    Dry eyes       blood glucose monitoring lancets     408 each    Use to test blood sugar 4 times daily or as directed.  1 box = 102 lancets    Type II diabetes mellitus (H)       blood glucose monitoring test strip    ACCU-CHEK EDINSON PLUS    400 each    Use to test blood sugar 4 times daily    Type II diabetes mellitus (H)       carvedilol 12.5 MG tablet    COREG    180 tablet    Take 1 tablet (12.5 mg) by mouth 2 times daily (with meals)    Liver cirrhosis secondary to ESTRADA (H), Secondary esophageal varices without bleeding (H)       dapagliflozin 10 MG Tabs tablet    FARXIGA    90 tablet    Take 1 tablet (10 mg) by mouth daily    Type 2 diabetes mellitus with hyperglycemia, with long-term current use of insulin (H)       ertapenem 1 GM vial    INVanz    10 each    Inject 1 g into the vein every 24 hours for 14 days    Parotid abscess       hypromellose-dextran Soln ophthalmic solution     1 Bottle    Place 1 drop into the right eye every hour as needed for dry eyes Apply at least 4 times daily and as needed for dry eye    Dry eyes       insulin aspart 100 UNIT/ML injection    NovoLOG FLEXPEN    24 mL    1 unit per 4 Gms CHO at meals and snacks + correction scale of 1 unit per 25 mg/dL over 125. Average daily dose is 75 units.    Type II diabetes mellitus (H)       insulin degludec 200 UNIT/ML pen    TRESIBA    36 mL    Take 120 units daily.    Type 2 diabetes mellitus with hyperglycemia, with long-term current use of insulin (H)       insulin pen needle 32G X 4 MM    BD VIKTORIA U/F    100 each     Use as directed.    Type II diabetes mellitus (H)       lactobacillus rhamnosus (GG) capsule     28 capsule    Take 1 capsule by mouth 2 times daily for 14 days    Long term (current) use of antibiotics       lactulose 10 GM/15ML solution    CHRONULAC    7200 mL    Take 45 mLs (30 g) by mouth 4 times daily    Liver cirrhosis secondary to ESTRADA (H)       OLANZapine 2.5 MG tablet    zyPREXA    30 tablet    Take 1 tablet (2.5 mg) by mouth At Bedtime    Insomnia, unspecified type       omeprazole 40 MG capsule    priLOSEC    90 capsule    Take 1 capsule (40 mg) by mouth daily    Gastroesophageal reflux disease, esophagitis presence not specified       oxyCODONE IR 5 MG tablet    ROXICODONE    18 tablet    Take 1 tablet (5 mg) by mouth every 3 hours as needed for moderate to severe pain    Acute post-operative pain       potassium chloride SA 20 MEQ CR tablet    K-DUR/KLOR-CON M    90 tablet    Take 1 tablet (20 mEq) by mouth daily    Hypokalemia       pravastatin 10 MG tablet    PRAVACHOL    90 tablet    Take 2 tablets (20 mg) by mouth daily    Hyperlipidemia LDL goal <100       RA VITAMIN B-12 TR 1000 MCG Tbcr   Generic drug:  cyanocobalamin      Take 1,000 mcg by mouth every morning        rifaximin 550 MG Tabs tablet    XIFAXAN    60 tablet    Take 1 tablet (550 mg) by mouth 2 times daily    Hepatic encephalopathy (H)       spironolactone 25 MG tablet    ALDACTONE    90 tablet    Take 1 tablet (25 mg) by mouth daily    Other ascites       THERAVITE PO      Take 1 tablet by mouth every morning        VITAMIN D-3 PO      Take 2,000 Units by mouth every morning

## 2017-11-08 NOTE — PROGRESS NOTES
Nursing Note  Frandy Workman [facility] for:   Chief Complaint   Patient presents with     Infusion     Invanz Day #3/14     During today's [facility] appointment, orders from CLAUDIA Fonseca were completed.  Frequency: daily #3/14 (1st dose in hospital)    Progress note:  Patient identification verified by name and date of birth.  Assessment completed.  Vitals recorded in Doc Flowsheets.  Patient was provided with education regarding infusion and possible side effects.  Patient verbalized understanding.      needed: No  Premedications: were not ordered.  Infusion Rates: infusion given over approximately 30 mins.  Approximate Infusion length:30 minutes.   Labs: were not ordered for this appointment.  Vascular access: PICC accessed today.  Treatment Conditions: patient denies fever, chills, signs of infection, recent illness, on antibiotics, productive cough or elevated temperature.  Patient tolerated infusion: well.    Discharge Plan:   Follow up plan of care with: ongoing infusions [facility].  Discharge instructions were reviewed with patient.  Patient/representative verbalized understanding of discharge instructions and all questions answered.  Patient discharged [facility] in stable condition.    KARUNA HANCOCK, CHAVEZ        /54  Pulse 80  Temp 98.8  F (37.1  C)  Resp 18  SpO2 100%

## 2017-11-09 ENCOUNTER — INFUSION THERAPY VISIT (OUTPATIENT)
Dept: INFUSION THERAPY | Facility: CLINIC | Age: 52
End: 2017-11-09
Attending: PHYSICIAN ASSISTANT
Payer: MEDICARE

## 2017-11-09 VITALS
TEMPERATURE: 97.6 F | OXYGEN SATURATION: 98 % | HEART RATE: 78 BPM | DIASTOLIC BLOOD PRESSURE: 50 MMHG | SYSTOLIC BLOOD PRESSURE: 122 MMHG

## 2017-11-09 DIAGNOSIS — K11.3 PAROTID ABSCESS: Primary | ICD-10-CM

## 2017-11-09 LAB
BACTERIA SPEC CULT: NORMAL
BACTERIA SPEC CULT: NORMAL
Lab: NORMAL
Lab: NORMAL
SPECIMEN SOURCE: NORMAL
SPECIMEN SOURCE: NORMAL

## 2017-11-09 PROCEDURE — 25000128 H RX IP 250 OP 636: Mod: ZF | Performed by: PHYSICIAN ASSISTANT

## 2017-11-09 PROCEDURE — 96365 THER/PROPH/DIAG IV INF INIT: CPT

## 2017-11-09 RX ORDER — ERTAPENEM 1 G/1
1 INJECTION, POWDER, LYOPHILIZED, FOR SOLUTION INTRAMUSCULAR; INTRAVENOUS EVERY 24 HOURS
Status: CANCELLED | OUTPATIENT
Start: 2017-11-10

## 2017-11-09 RX ADMIN — SODIUM CHLORIDE 1 G: 9 INJECTION, SOLUTION INTRAVENOUS at 16:21

## 2017-11-09 ASSESSMENT — ENCOUNTER SYMPTOMS
HALLUCINATIONS: 0
ALTERED TEMPERATURE REGULATION: 1
TASTE DISTURBANCE: 1
SKIN CHANGES: 0
POLYPHAGIA: 0
VOMITING: 0
BLOATING: 1
RECTAL PAIN: 0
POOR WOUND HEALING: 0
SORE THROAT: 0
EYE IRRITATION: 1
NAUSEA: 0
WEIGHT GAIN: 0
HOARSE VOICE: 1
INCREASED ENERGY: 0
FATIGUE: 1
NIGHT SWEATS: 0
SINUS PAIN: 1
EYE PAIN: 1
NAIL CHANGES: 0
CONSTIPATION: 0
TROUBLE SWALLOWING: 1
CHILLS: 1
EYE REDNESS: 1
SMELL DISTURBANCE: 0
EYE WATERING: 1
DECREASED APPETITE: 1
NECK MASS: 0
ABDOMINAL PAIN: 1
SINUS CONGESTION: 0
DOUBLE VISION: 0
HEARTBURN: 0
POLYDIPSIA: 1
JAUNDICE: 0
BOWEL INCONTINENCE: 0
DIARRHEA: 1
WEIGHT LOSS: 0
BLOOD IN STOOL: 0
FEVER: 0

## 2017-11-09 NOTE — MR AVS SNAPSHOT
After Visit Summary   11/9/2017    Frandy Workman    MRN: [id number]           Patient Information     Date Of Birth          1965        Visit Information        Provider Department      11/9/2017 4:00 PM UC 50 ATC; UC SPEC INFUSION [facility]        Today's Diagnoses     Parotid abscess    -  1       Follow-ups after your visit        Your next 10 appointments already scheduled     Nov 10, 2017  2:40 PM CST   (Arrive by 2:25 PM)   RETURN TUMOR VISIT with Asiya Morgan MD   [facility] ([facility])    [address] [place] [address]  4th [facility] [address]   [phone]            Nov 10, 2017  3:00 PM CST   Infusion 60 with UC SPEC INFUSION, UC 51 ATC   [facility] ([facility])    [address] [place] [address]  2nd [facility] [address] [phone]            Nov 11, 2017  2:00 PM CST   Infusion 60 with UC SPEC INFUSION, UC 44 ATC   [facility] ([facility])    [address] [place] [address]  2nd [facility] [address] [phone]            Nov 12, 2017  1:00 PM CST   Infusion 60 with UC SPEC INFUSION, UC 49 ATC   [facility] ([facility])    [address] [facility]  2nd [facility] [address] [phone]            Nov 13, 2017  4:00 PM CST   Infusion 60 with UC SPEC INFUSION, UC 48 ATC   [facility] ([facility])    [address] [place] [address]  2nd [facility] [address] [phone]            Nov 14, 2017  4:00 PM CST   Infusion 60 with UC SPEC INFUSION   [facility] ([facility])    [address] [place] [address]  [facility] [address] [phone]              Who to contact     If you have questions or need follow up information about today's clinic visit or your schedule please contact [facility] directly at [phone].  Normal or non-critical lab and imaging results will be communicated to you by Knowromhart, letter or phone within 4 business days after the clinic has received the results. If you do not hear from us within 7 days, please contact the clinic through Knowromhart or phone. If you have a critical or abnormal lab result, we will notify you by phone as soon as possible.  Submit refill requests through [employer] or call your pharmacy and they will forward the refill request to us. Please allow 3 business days for your refill to be completed.          Additional Information About Your Visit        Knowromhart Information     [employer] gives you secure access to your electronic health record. If you see a primary care provider, you can also send messages to your care team and make appointments. If you have questions, please call your primary care clinic.  If you do not have a primary care provider, please call [phone] and they will assist you.        Care EveryWhere ID     This is your Care EveryWhere ID. This could be used by other organizations to access your [place] medical records  [id number]        Your Vitals Were     Pulse Temperature Pulse Oximetry             78 97.6  F (36.4  C) (Oral) 98%          Blood Pressure from Last 3 Encounters:   11/09/17 122/50   11/08/17 110/54   11/07/17 116/57    Weight from Last 3 Encounters:   11/02/17 86.2 kg (190 lb 0.6 oz)   10/18/17 86.2 kg (190 lb)   10/18/17 86.5 kg (190 lb 11.2 oz)              Today, you had the following     No orders found for display         Today's Medication Changes          These changes are accurate as of: 11/9/17  5:01 PM.  If you have any questions, ask your nurse or doctor.                These medicines have changed or have updated prescriptions.        Dose/Directions    dapagliflozin 10 MG Tabs tablet   Commonly known as:  FARXIGA   This may have changed:  when to take this   Used for:  Type 2 diabetes mellitus with hyperglycemia, with long-term current use of insulin (H)        Dose:  10 mg   Take 1 tablet (10 mg) by mouth daily   Quantity:  90 tablet   Refills:  3       insulin degludec 200 UNIT/ML pen   Commonly known as:  TRESIBA   This may have changed:    - how much to take  - how to take this  - when to take this  - additional instructions   Used for:  Type 2 diabetes mellitus with hyperglycemia, with long-term current use of insulin (H)        Take 120 units daily.   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 (H)        Dose:  30 g   Take 45 mLs (30 g) by mouth 4 times daily   Quantity:  7200 mL   Refills:  11       omeprazole 40 MG capsule   Commonly known as:  priLOSEC   This may have changed:  when to take this   Used for:  Gastroesophageal reflux disease, esophagitis presence not specified        Dose:  40 mg   Take 1 capsule (40 mg) by mouth daily   Quantity:  90 capsule   Refills:  2       potassium chloride SA 20 MEQ CR tablet   Commonly known as:  K-DUR/KLOR-CON M   This may have changed:  when to take this   Used for:  Hypokalemia        Dose:  20 mEq   Take 1 tablet (20 mEq) by mouth daily   Quantity:  90 tablet   Refills:  3       pravastatin 10 MG tablet   Commonly known as:  PRAVACHOL   This may have changed:    - how much to take  - when to take this   Used for:  Hyperlipidemia LDL goal <100        Dose:  20 mg   Take 2 tablets (20 mg) by mouth daily   Quantity:  90 tablet   Refills:  3       spironolactone 25 MG tablet   Commonly known as:  ALDACTONE   This may have changed:  when to take this    Used for:  Other ascites        Dose:  25 mg   Take 1 tablet (25 mg) by mouth daily   Quantity:  90 tablet   Refills:  1                Primary Care Provider Office Phone # Fax #    Natalie Mitzi Andrés Russell [phone]       [address] [place] [phone]  [facility] [address]        Equal Access to Services     SUSANMAYCOL MICHAEL : Hadii aad ku hadasho Soomaali, waaxda luqadaha, qaybta kaalmada adeegyada, waxay idiin hayaan adeeg kharash lashannan . So [facility] [phone].    ATENCIÓN: Si habla español, tiene a hanley disposición servicios gratuitos de asistencia lingüística. Llame al [phone].    We comply with applicable federal civil rights laws and [place] laws. We do not discriminate on the basis of race, color, national origin, age, disability, sex, sexual orientation, or gender identity.            Thank you!     Thank you for choosing [facility]  for your care. Our goal is always to provide you with excellent care. Hearing back from our patients is one way we can continue to improve our services. Please take a few minutes to complete the written survey that you may receive in the mail after your visit with us. Thank you!             Your Updated Medication List - Protect others around you: Learn how to safely use, store and throw away your medicines at www.disposemymeds.org.          This list is accurate as of: 11/9/17  5:01 PM.  Always use your most recent med list.                   Brand Name Dispense Instructions for use Diagnosis    artificial tears Oint ophthalmic ointment     5 g    Place 1 g into the right eye At Bedtime    Dry eyes       blood glucose monitoring lancets     408 each    Use to test blood sugar 4 times daily or as directed.  1 box = 102 lancets    Type II diabetes mellitus (H)       blood glucose monitoring test strip    ACCU-CHEK EDINSON PLUS    400 each    Use to test blood sugar 4 times daily    Type II diabetes mellitus (H)        carvedilol 12.5 MG tablet    COREG    180 tablet    Take 1 tablet (12.5 mg) by mouth 2 times daily (with meals)    Liver cirrhosis secondary to ESTRADA (H), Secondary esophageal varices without bleeding (H)       dapagliflozin 10 MG Tabs tablet    FARXIGA    90 tablet    Take 1 tablet (10 mg) by mouth daily    Type 2 diabetes mellitus with hyperglycemia, with long-term current use of insulin (H)       ertapenem 1 GM vial    INVanz    10 each    Inject 1 g into the vein every 24 hours for 14 days    Parotid abscess       hypromellose-dextran Soln ophthalmic solution     1 Bottle    Place 1 drop into the right eye every hour as needed for dry eyes Apply at least 4 times daily and as needed for dry eye    Dry eyes       insulin aspart 100 UNIT/ML injection    NovoLOG FLEXPEN    24 mL    1 unit per 4 Gms CHO at meals and snacks + correction scale of 1 unit per 25 mg/dL over 125. Average daily dose is 75 units.    Type II diabetes mellitus (H)       insulin degludec 200 UNIT/ML pen    TRESIBA    36 mL    Take 120 units daily.    Type 2 diabetes mellitus with hyperglycemia, with long-term current use of insulin (H)       insulin pen needle 32G X 4 MM    BD VIKTORIA U/F    100 each    Use as directed.    Type II diabetes mellitus (H)       lactobacillus rhamnosus (GG) capsule     28 capsule    Take 1 capsule by mouth 2 times daily for 14 days    Long term (current) use of antibiotics       lactulose 10 GM/15ML solution    CHRONULAC    7200 mL    Take 45 mLs (30 g) by mouth 4 times daily    Liver cirrhosis secondary to ESTRADA (H)       OLANZapine 2.5 MG tablet    zyPREXA    30 tablet    Take 1 tablet (2.5 mg) by mouth At Bedtime    Insomnia, unspecified type       omeprazole 40 MG capsule    priLOSEC    90 capsule    Take 1 capsule (40 mg) by mouth daily    Gastroesophageal reflux disease, esophagitis presence not specified       oxyCODONE IR 5 MG tablet    ROXICODONE    18 tablet    Take 1 tablet (5 mg) by mouth every 3 hours  as needed for moderate to severe pain    Acute post-operative pain       potassium chloride SA 20 MEQ CR tablet    K-DUR/KLOR-CON M    90 tablet    Take 1 tablet (20 mEq) by mouth daily    Hypokalemia       pravastatin 10 MG tablet    PRAVACHOL    90 tablet    Take 2 tablets (20 mg) by mouth daily    Hyperlipidemia LDL goal <100       RA VITAMIN B-12 TR 1000 MCG Tbcr   Generic drug:  cyanocobalamin      Take 1,000 mcg by mouth every morning        rifaximin 550 MG Tabs tablet    XIFAXAN    60 tablet    Take 1 tablet (550 mg) by mouth 2 times daily    Hepatic encephalopathy (H)       spironolactone 25 MG tablet    ALDACTONE    90 tablet    Take 1 tablet (25 mg) by mouth daily    Other ascites       THERAVITE PO      Take 1 tablet by mouth every morning        VITAMIN D-3 PO      Take 2,000 Units by mouth every morning

## 2017-11-09 NOTE — PROGRESS NOTES
Nursing Note  Frandy Workman [facility]:   Chief Complaint   Patient presents with     Infusion     Invanz (ertapenem)     During [facility]'s [facility] appointment, orders from Kera Dillon PA-C were completed.  Frequency: daily    Progress note:  Patient identification verified by name and date of birth.  Assessment completed.  Vitals recorded in Doc Flowsheets.  Patient was provided with education regarding infusion and possible side effects.  Patient verbalized understanding.      needed: No  Premedications: were not ordered.  Infusion Rates: given over 30 minutes.  Approximate Infusion length:1 hours.   Labs: were not ordered for this appointment.  Vascular access: PICC accessed today.  Treatment Conditions: patient denies fever, chills, signs of infection, recent illness, on antibiotics, productive cough or elevated temperature.  Patient tolerated infusion: well.    Drug Waste Record? No     Discharge Plan:   Follow up plan of care with: ongoing infusions at [facility].  Discharge instructions were reviewed with patient.  Patient/representative verbalized understanding of discharge instructions and all questions answered.  [facility] in stable condition.    Marie Gomez RN    Administrations This Visit     ertapenem (INVanz) 1 g in NaCl 0.9 % 50 mL intermittent infusion     Admin Date Action Dose Route Administered By             11/09/2017 New Bag 1 g Intravenous Marie Gomez RN                          /50  Pulse 78  Temp 97.6  F (36.4  C) (Oral)  SpO2 98%

## 2017-11-10 ENCOUNTER — INFUSION THERAPY VISIT (OUTPATIENT)
Dept: INFUSION THERAPY | Facility: CLINIC | Age: 52
End: 2017-11-10
Attending: PHYSICIAN ASSISTANT
Payer: MEDICARE

## 2017-11-10 ENCOUNTER — OFFICE VISIT (OUTPATIENT)
Dept: OTOLARYNGOLOGY | Facility: CLINIC | Age: 52
End: 2017-11-10

## 2017-11-10 ENCOUNTER — CARE COORDINATION (OUTPATIENT)
Dept: CARE COORDINATION | Facility: CLINIC | Age: 52
End: 2017-11-10

## 2017-11-10 VITALS
DIASTOLIC BLOOD PRESSURE: 47 MMHG | HEART RATE: 81 BPM | OXYGEN SATURATION: 99 % | SYSTOLIC BLOOD PRESSURE: 130 MMHG | RESPIRATION RATE: 16 BRPM | TEMPERATURE: 97 F

## 2017-11-10 VITALS — HEIGHT: 69 IN | BODY MASS INDEX: 27.99 KG/M2 | WEIGHT: 189 LBS

## 2017-11-10 DIAGNOSIS — K11.3 PAROTID ABSCESS: Primary | ICD-10-CM

## 2017-11-10 DIAGNOSIS — H92.01 RIGHT EAR PAIN: Primary | ICD-10-CM

## 2017-11-10 DIAGNOSIS — K11.3 PAROTID ABSCESS: ICD-10-CM

## 2017-11-10 PROCEDURE — 96365 THER/PROPH/DIAG IV INF INIT: CPT

## 2017-11-10 PROCEDURE — 25000128 H RX IP 250 OP 636: Mod: ZF | Performed by: PHYSICIAN ASSISTANT

## 2017-11-10 RX ORDER — ERTAPENEM 1 G/1
1 INJECTION, POWDER, LYOPHILIZED, FOR SOLUTION INTRAMUSCULAR; INTRAVENOUS EVERY 24 HOURS
Status: CANCELLED | OUTPATIENT
Start: 2017-11-11

## 2017-11-10 RX ORDER — OFLOXACIN 3 MG/ML
5 SOLUTION AURICULAR (OTIC) 2 TIMES DAILY
Qty: 5 ML | Refills: 0 | Status: SHIPPED | OUTPATIENT
Start: 2017-11-10 | End: 2017-11-20

## 2017-11-10 RX ADMIN — SODIUM CHLORIDE 1 G: 9 INJECTION, SOLUTION INTRAVENOUS at 15:28

## 2017-11-10 ASSESSMENT — PAIN SCALES - GENERAL: PAINLEVEL: MODERATE PAIN (5)

## 2017-11-10 NOTE — LETTER
11/10/2017       RE: Frandy Workman  [address]   [facility] [address]     Dear Colleague,    Thank you for referring your patient, Frandy Workman, to the [facility] EAR NOSE AND THROAT at [facility]. Please see a copy of my visit note below.    Dear Dr. Rueda:    I had the pleasure of seeing Frandy Workman in follow-up today at the [facility].     History of Present Illness:   Frandy Workman is a 52-year-old gentleman who was initially referred for evaluation of a right parotid lesion. The mass had been present for approximately 2 months and the patient had initially attributed it to dental issues. He was evaluated by Dr. Rueda who obtained a CT scan which showed a mass of the right parotid region. The patient was having significant pain associated with the mass. He underwent an FNA in clinic that showed inflammatory cells and no malignancy. He was placed on a course of augmentin and sent for image guided FNA of the mass. The mass again was nondiagnostic with just inflammatory cells but the mass appeared to have increased in size on the U/S performed for the FNA. His case was discussed at tumor board and the recommendation was for surgical excision given his history of skin cancer in the right temporal region as well as his anticipation of potentially being placed on the transplant list for his liver.     He was taken to the OR on 11/2/2017 for a parotidectomy. Intraoperatively there was expression of purulence from the mass, intraoperative cytology and frozen sections were consistent with an abscess. The abscess was encasing the superior division of the facial nerve. The nerve was preserved in the OR. Cultures showed strep that was penicillin sensitive. Postoperatively ID was consulted and recommended a prolonged course of IV antibiotics. He was discharged with a PICC and ertapenem. He is getting daily infusions without issue. He  is doing his eye care daily. His pain is controlled.      MEDICATIONS:     Current Outpatient Prescriptions   Medication Sig Dispense Refill     ofloxacin (FLOXIN) 0.3 % otic solution Place 5 drops into the right ear 2 times daily for 10 days 5 mL 0     lactobacillus rhamnosus, GG, (CULTURELL) capsule Take 1 capsule by mouth 2 times daily for 14 days 28 capsule 0     ertapenem (INVANZ) 1 GM vial Inject 1 g into the vein every 24 hours for 14 days 10 each      artificial tears OINT ophthalmic ointment Place 1 g into the right eye At Bedtime 5 g 3     hypromellose-dextran (ARTIFICAL TEARS) SOLN ophthalmic solution Place 1 drop into the right eye every hour as needed for dry eyes Apply at least 4 times daily and as needed for dry eye 1 Bottle 3     dapagliflozin (FARXIGA) 10 MG TABS tablet Take 1 tablet (10 mg) by mouth daily (Patient taking differently: Take 10 mg by mouth every morning ) 90 tablet 3     pravastatin (PRAVACHOL) 10 MG tablet Take 2 tablets (20 mg) by mouth daily (Patient taking differently: Take 10 mg by mouth every morning ) 90 tablet 3     blood glucose monitoring (ACCU-CHEK EDINSON PLUS) test strip Use to test blood sugar 4 times daily 400 each 3     blood glucose monitoring (ACCU-CHEK FASTCLIX) lancets Use to test blood sugar 4 times daily or as directed.  1 box = 102 lancets 408 each 3     rifaximin (XIFAXAN) 550 MG TABS tablet Take 1 tablet (550 mg) by mouth 2 times daily 60 tablet 11     lactulose (CHRONULAC) 10 GM/15ML solution Take 45 mLs (30 g) by mouth 4 times daily (Patient taking differently: Take 60 g by mouth 4 times daily 2-6 TIMES A DAY) 7200 mL 11     insulin degludec (TRESIBA) 200 UNIT/ML pen Take 120 units daily. (Patient taking differently: Inject 110 Units Subcutaneous every morning Take 110 units dailY AS OF 10/19/17) 36 mL 3     spironolactone (ALDACTONE) 25 MG tablet Take 1 tablet (25 mg) by mouth daily (Patient taking differently: Take 25 mg by mouth every morning ) 90 tablet 1      insulin pen needle (BD VIKTORIA U/F) 32G X 4 MM Use as directed. 100 each 11     carvedilol (COREG) 12.5 MG tablet Take 1 tablet (12.5 mg) by mouth 2 times daily (with meals) 180 tablet 3     omeprazole (PRILOSEC) 40 MG capsule Take 1 capsule (40 mg) by mouth daily (Patient taking differently: Take 40 mg by mouth every evening ) 90 capsule 2     OLANZapine (ZYPREXA) 2.5 MG tablet Take 1 tablet (2.5 mg) by mouth At Bedtime 30 tablet 5     potassium chloride SA (K-DUR/KLOR-CON M) 20 MEQ CR tablet Take 1 tablet (20 mEq) by mouth daily (Patient taking differently: Take 20 mEq by mouth every morning ) 90 tablet 3     Cholecalciferol (VITAMIN D-3 PO) Take 2,000 Units by mouth every morning        insulin aspart (NOVOLOG FLEXPEN) 100 UNIT/ML injection 1 unit per 4 Gms CHO at meals and snacks + correction scale of 1 unit per 25 mg/dL over 125. Average daily dose is 75 units. 24 mL 11     cyanocobalamin (RA VITAMIN B-12 TR) 1000 MCG TBCR Take 1,000 mcg by mouth every morning        Multiple Vitamin (THERAVITE PO) Take 1 tablet by mouth every morning        oxyCODONE IR (ROXICODONE) 5 MG tablet Take 1 tablet (5 mg) by mouth every 3 hours as needed for moderate to severe pain (Patient not taking: Reported on 11/10/2017) 18 tablet 0       ALLERGIES:    Allergies   Allergen Reactions     Codeine Other (See Comments)     Cannot take due to liver         HABITS/SOCIAL HISTORY:   Chewing tobacco use  Quit alcohol in   Wife   Daughter lives in [place]    Social History     Social History     Marital status:      Spouse name: N/A     Number of children: N/A     Years of education: N/A     Occupational History     Not on file.     Social History Main Topics     Smoking status: Former Smoker     Packs/day: 6.00     Years: 30.00     Types: Cigars     Start date: 2016     Quit date: 5/15/2013     Smokeless tobacco: Current User     Types: Chew      Comment: 1 tin per week     Alcohol use No      Comment: quit  Sept. 1996     Drug use: No     Sexual activity: Not Currently     Partners: Female     Birth control/ protection: Condom     Other Topics Concern     Not on file     Social History Narrative       PAST MEDICAL HISTORY:   Past Medical History:   Diagnosis Date     Anemia 2013    Low blood plates current is 37     BPH (benign prostatic hyperplasia)      Cholelithiasis      Conductive hearing loss 8/16/2017    Have a lump on my right side of my face.  Had wax discharge     Depressive disorder 1986    Suffer effects throughout life     Gastroesophageal reflux disease 12/1/2014    Being treated with Prilosac     Hepatitis 2014    Diagnosed with schrosis ESTRADA in 2014.  Suffer from hepatatie     Hyperlipidemia      Liver cirrhosis secondary to ESTRADA (H)      Type II diabetes mellitus (H)         PAST SURGICAL HISTORY:   Past Surgical History:   Procedure Laterality Date     ESOPHAGOSCOPY, GASTROSCOPY, DUODENOSCOPY (EGD), COMBINED N/A 11/17/2016    Procedure: COMBINED ESOPHAGOSCOPY, GASTROSCOPY, DUODENOSCOPY (EGD);  Surgeon: Santi Rosas MD;  Location: [facility] GI     KNEE SURGERY Left      PAROTIDECTOMY, RADICAL NECK DISSECTION Right 11/2/2017    Procedure: PAROTIDECTOMY, RADICAL NECK DISSECTION;  Right Superfacial Parotidectomy , Facial nerve repair. with [facility] facial nerve monitor.;  Surgeon: Asiya Morgan MD;  Location: [facility] OR     PICC INSERTION Left 11/06/2017    4fr SL BioFlo PICC, 44cm in the L basilic vein w/ tip in the low SVC       FAMILY HISTORY:    Family History   Problem Relation Age of Onset     Prostate Cancer Maternal Grandfather      Substance Abuse Maternal Grandfather      Alcohol     Colon Cancer Father 60     Pancreatic Cancer Father 60     Prostate Cancer Father      Colorectal Cancer Father      Macular Degeneration Father      CANCER Father      Colorectal Cancer Maternal Grandmother      CANCER Maternal Grandmother      Substance Abuse Maternal Grandmother      Alcohol     Colorectal Cancer  "Paternal Grandmother      CANCER Mother      DIABETES Mother       3/2016     CEREBROVASCULAR DISEASE Mother      Passed away in Feb of this year, 80 years old.     Thyroid Disease Mother      Depression Mother      Asthma Sister      Had since birth     Thyroid Disease Sister      Depression Sister      Liver Disease No family hx of        REVIEW OF SYSTEMS:  12 point ROS was negative other than the symptoms noted above in the HPI.  Patient Supplied Answers to Review of Systems  [facility] ROS 2017   Constitutional Appetite change, Problems with sleep   Neurology Numbness   Eyes -   Ears, Nose, Throat Ear pain, Ringing/noise in ears, Nasal congestion or drainage   Cardiopulmonary -   Gastrointestinal/Genitourinary Unexplained nausea/vomiting   Musculoskeletal Swollen legs/feet   Allergy/Immunology Allergies or hay fever   Hematologic Bleeding problems   Endocrine Thirst   Skin -         PHYSICAL EXAMINATION:   Ht 1.75 m (5' 8.9\")  Wt 85.7 kg (189 lb)  BMI 27.99 kg/m2   Patient in NAD  Jaw bra in place  Incision healing appropriately with prolene sutures in place, incision flat, no fluid collection, no fluctuance  Incomplete eye closure on right, no movement in right brow, some movement in lower face but weak  Dried blood in left EAC    RESULTS REVIEWED:     SPECIMEN(S):   A: Tissue of skin flap over parotid mass   B: No specimen recieved from OR   C: Aspirate collected for cytology rapid read   D: Tissue over temporalis   E: Biopsy of parotid mass   F: Right parotidectomy     FINAL DIAGNOSIS:   A. TISSUE OF SKIN FLAP OVER PAROTID MASS, BIOPSY:   - Focal chronic inflammation, negative for malignancy     B. NO SPECIMEN RECEIVED FROM OR     C. ASPIRATE COLLECTED FOR CYTOLOGY RAPID EVALUATION   - Please see cytology report     D. TISSUE OVER TEMPORALIS, BIOPSY:   - Fibrovascular tissue with acute and chronic inflammation   - Negative for malignancy     E. PAROTID MASS, BIOPSY:   - Acute and chronic " inflammation with focal giant cell reaction   - Negative for malignancy     F. PAROTID GLAND, RIGHT PAROTIDECTOMY:   - Acute inflammation and granulation tissue with abscess formation   - No malignancy identified      CYTOLOGIC INTERPRETATION:      Miscellaneous fluid, Right Parotid Mass:   Negative for malignancy   Abundant acute inflammation present.   Specimen Adequacy: Satisfactory for evaluation.     COMMENT:   The specimen consists of only acute inflammation and debris.   Correlation with associated surgical specimen [id number] is recommended.       Culture & Susceptibility   STREPTOCOCCUS ANGINOSUS   Antibiotic Interpretation Sensitivity Unit Method Status   AMPICILLIN Sensitive 0.12 ug/mL NICHOLE Final   CEFOTAXIME Sensitive <=0.25 ug/mL NICHOLE Final   CEFTRIAXONE Sensitive <=0.25 ug/mL NICHOLE Final   CLINDAMYCIN Sensitive <=0.06 ug/mL NICHOLE Final   MEROPENEM Sensitive <=0.06 ug/mL NICHOLE Final   PENICILLIN Sensitive 0.06 ug/mL NICHOLE Final   VANCOMYCIN Sensitive 0.5 ug/mL NICHOLE Final              IMPRESSION AND PLAN:   Frandy Workman is a 52-year-old gentleman with a right-sided parotid mass s/p superficial parotidectomy. Final pathology was consistent with an abscess growing penicillin sensitive strep. He is currently undergoing a prolonged course of IV antibiotics. He is healing appropriately without evidence of recurrence of the abscess. Sutures were removed today. He should follow-up with ID as scheduled. I will see him back in about 3 weeks at which point he will have undergone approximately 1 month of antibiotic therapy. In the meantime we will work on scheduling him for an eyelid weight with Dr Malave due to his incomplete eye closure. He should continue eye care as instructed. He was also given a week course of floxin drops for his ear to help with the crusted blood within the EAC which is causing him discomfort.    Thank you very much for the opportunity to participate in the care of your patient.      Asiya ÁLVAREZ  PORSHA Morgan.  Otolaryngology- Head & Neck Surgery    CC:  Natalie Russell MD  [address] [facility] [address]  [facility] [address]    Sheba Rueda MD  Otolaryngology/Head & Neck Surgery  [facility] [address]    Sergio Noel MD  Department of Infectious Disease  [facility]

## 2017-11-10 NOTE — PATIENT INSTRUCTIONS
Dear Frandy Workman    Thank you for choosing [facility] ([facility]) for your infusion.  The following information is a summary of our appointment as well as important reminders.        We look forward in seeing you on your next appointment here at [facility].  Please don t hesitate to call us at [phone] to reschedule any of your appointments or to speak with one of the [facility] registered nurses.  It was a pleasure taking care of you today.    Sincerely,    [facility]  [facility]  [address] [place] [address]  [id number]  Phone:  [phone]

## 2017-11-10 NOTE — PROGRESS NOTES
Nursing Note  Frandy Workman [facility] for:   Chief Complaint   Patient presents with     Infusion     Invanz     During today's [facility] appointment, orders from Sejal Dillon PA-C were completed.  Frequency: daily x 14 days. Today is dose #4    Progress note:  Patient identification verified by name and date of birth.  Assessment completed.  Vitals recorded in Doc Flowsheets.  Patient was provided with education regarding infusion and possible side effects.  Patient verbalized understanding.      needed: No  Premedications: were not ordered.  Infusion Rates: infusion given over approximately 30 min.  Approximate Infusion length:30 minutes.   Labs: were not ordered for this appointment.  Vascular access: PICC used for infusion today.   Treatment Conditions: non-applicable.  Patient tolerated infusion: well.    Patient's PICC dressing had small amount of dried blood around insertion point. Dressing changed per patient's request.     10 minutes additional time spent on nursing cares.     Discharge Plan:   Follow up plan of care with: ongoing infusions at [facility].  Discharge instructions were reviewed with patient.  Patient/representative verbalized understanding of discharge instructions and all questions answered.  Patient discharged from [facility] in stable condition.    Humera Brothers RN     Administrations This Visit     ertapenem (INVanz) 1 g in NaCl 0.9 % 50 mL intermittent infusion     Admin Date Action Dose Route Administered By             11/10/2017 New Bag 1 g Intravenous Humera Brothers RN                          /47  Pulse 81  Temp 97  F (36.1  C) (Oral)  Resp 16  SpO2 99%

## 2017-11-10 NOTE — MR AVS SNAPSHOT
After Visit Summary   11/10/2017    Frandy Workman    MRN: [id number]           Patient Information     Date Of Birth          1965        Visit Information        Provider Department      11/10/2017 3:00 PM UC 51 ATC; UC SPEC INFUSION [facility] Specialty and Procedure        Today's Diagnoses     Parotid abscess    -  1      Care Instructions    Dear Frandy Workman    Thank you for choosing [facility] Specialty Infusion and Procedure Center ([facility]) for your infusion.  The following information is a summary of our appointment as well as important reminders.        We look forward in seeing you on your next appointment here at [facility].  Please don t hesitate to call us at [phone] to reschedule any of your appointments or to speak with one of the [facility] registered nurses.  It was a pleasure taking care of you today.    Sincerely,    [facility]  Specialty Infusion & Procedure Center  [address] [place] [address]  [address]  Phone:  [phone]            Follow-ups after your visit        Your next 10 appointments already scheduled     Nov 11, 2017  2:00 PM CST   Infusion 60 with UC SPEC INFUSION, UC 44 ATC   [facility] Specialty and Procedure ([facility])    [address] [address] [phone]            Nov 12, 2017  1:00 PM CST   Infusion 60 with UC SPEC INFUSION, UC 49 ATC   [facility] Specialty and Procedure ([facility])    [address] [address] [phone]            Nov 13, 2017  4:00 PM CST   Infusion 60 with UC SPEC INFUSION, UC 48 ATC   [facility] Specialty and Procedure ([facility])    [address] [place] [address] [phone]            Nov 14, 2017   4:00 PM CST   Infusion 60 with UC SPEC INFUSION, UC 49 ATC   [facility] Specialty and Procedure ([facility])    [address] [facility]  [address] [facility] [address] [phone]            Nov 15, 2017  4:00 PM CST   Infusion 60 with UC SPEC INFUSION, UC 49 ATC   [facility] Specialty and Procedure ([facility])    [address] [address] [phone]              Who to contact     If you have questions or need follow up information about today's clinic visit or your schedule please contact [facility] SPECIALTY AND PROCEDURE directly at [phone].  Normal or non-critical lab and imaging results will be communicated to you by Paper[employer]ven[employer], letter or phone within 4 business days after the clinic has received the results. If you do not hear from us within 7 days, please contact the clinic through [employer] or phone. If you have a critical or abnormal lab result, we will notify you by phone as soon as possible.  Submit refill requests through [employer] or call your pharmacy and they will forward the refill request to us. Please allow 3 business days for your refill to be completed.          Additional Information About Your Visit        [employer] Information     [employer] gives you secure access to your electronic health record. If you see a primary care provider, you can also send messages to your care team and make appointments. If you have questions, please call your primary care clinic.  If you do not have a primary care provider, please call [phone] and they will assist you.        Care EveryWhere ID     This is your Care EveryWhere ID. This could be used by other organizations to access your [facility] medical records  [id number]        Your Vitals Were     Pulse Temperature Respirations Pulse Oximetry          81 97  F (36.1  C) (Oral) 16 99%          Blood Pressure from Last 3 Encounters:   11/10/17 (P) 101/50   11/09/17 122/50   11/08/17 110/54    Weight from Last 3 Encounters:   11/10/17 85.7 kg (189 lb)   11/02/17 86.2 kg (190 lb 0.6 oz)   10/18/17 86.2 kg (190 lb)              Today, you had the following     No orders found for display         Today's Medication Changes          These changes are accurate as of: 11/10/17  4:17 PM.  If you have any questions, ask your nurse or doctor.               Start taking these medicines.        Dose/Directions    ofloxacin 0.3 % otic solution   Commonly known as:  FLOXIN   Used for:  Right ear pain   Started by:  Asiya Morgan MD        Dose:  5 drop   Place 5 drops into the right ear 2 times daily for 10 days   Quantity:  5 mL   Refills:  0         These medicines have changed or have updated prescriptions.        Dose/Directions    dapagliflozin 10 MG Tabs tablet   Commonly known as:  FARXIGA   This may have changed:  when to take this   Used for:  Type 2 diabetes mellitus with hyperglycemia, with long-term current use of insulin (H)        Dose:  10 mg   Take 1 tablet (10 mg) by mouth daily   Quantity:  90 tablet   Refills:  3       insulin degludec 200 UNIT/ML pen   Commonly known as:  TRESIBA   This may have changed:    - how much to take  - how to take this  - when to take this  - additional instructions   Used for:  Type 2 diabetes mellitus with hyperglycemia, with long-term current use of insulin (H)        Take 120 units daily.   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 (H)        Dose:  30 g   Take 45 mLs (30 g) by mouth 4 times daily   Quantity:  7200 mL   Refills:  11       omeprazole 40 MG capsule   Commonly known as:  priLOSEC   This may have changed:  when to take this   Used for:  Gastroesophageal reflux disease,  esophagitis presence not specified        Dose:  40 mg   Take 1 capsule (40 mg) by mouth daily   Quantity:  90 capsule   Refills:  2       potassium chloride SA 20 MEQ CR tablet   Commonly known as:  K-DUR/KLOR-CON M   This may have changed:  when to take this   Used for:  Hypokalemia        Dose:  20 mEq   Take 1 tablet (20 mEq) by mouth daily   Quantity:  90 tablet   Refills:  3       pravastatin 10 MG tablet   Commonly known as:  PRAVACHOL   This may have changed:    - how much to take  - when to take this   Used for:  Hyperlipidemia LDL goal <100        Dose:  20 mg   Take 2 tablets (20 mg) by mouth daily   Quantity:  90 tablet   Refills:  3       spironolactone 25 MG tablet   Commonly known as:  ALDACTONE   This may have changed:  when to take this   Used for:  Other ascites        Dose:  25 mg   Take 1 tablet (25 mg) by mouth daily   Quantity:  90 tablet   Refills:  1            Where to get your medicines      These medications were sent to [facility] [address] [place] [address] & [address], [place] [address]     Phone:  [phone]     ofloxacin 0.3 % otic solution                Primary Care Provider Office Phone # Fax #    Natalie Mitzi Russell [phone]       [address] [place] [address] 741  [facility] [address]        Equal Access to Services     MINDI RODRIGUEZ : Hadii curly ku hadasho Soomaali, waaxda luqadaha, qaybta kaalmada adeegyada, emy vuong. So [facility] [phone].    ATENCIÓN: Si habla español, tiene a hanley disposición servicios gratuitos de asistencia lingüística. Llame al [phone].    We comply with applicable federal civil rights laws and [place] laws. We do not discriminate on the basis of race, color, national origin, age, disability, sex, sexual orientation, or gender identity.            Thank you!     Thank you for choosing [facility] SPECIALTY AND  PROCEDURE  for your care. Our goal is always to provide you with excellent care. Hearing back from our patients is one way we can continue to improve our services. Please take a few minutes to complete the written survey that you may receive in the mail after your visit with us. Thank you!             Your Updated Medication List - Protect others around you: Learn how to safely use, store and throw away your medicines at www.disposemymeds.org.          This list is accurate as of: 11/10/17  4:17 PM.  Always use your most recent med list.                   Brand Name Dispense Instructions for use Diagnosis    artificial tears Oint ophthalmic ointment     5 g    Place 1 g into the right eye At Bedtime    Dry eyes       blood glucose monitoring lancets     408 each    Use to test blood sugar 4 times daily or as directed.  1 box = 102 lancets    Type II diabetes mellitus (H)       blood glucose monitoring test strip    ACCU-CHEK EDINSON PLUS    400 each    Use to test blood sugar 4 times daily    Type II diabetes mellitus (H)       carvedilol 12.5 MG tablet    COREG    180 tablet    Take 1 tablet (12.5 mg) by mouth 2 times daily (with meals)    Liver cirrhosis secondary to ESTRADA (H), Secondary esophageal varices without bleeding (H)       dapagliflozin 10 MG Tabs tablet    FARXIGA    90 tablet    Take 1 tablet (10 mg) by mouth daily    Type 2 diabetes mellitus with hyperglycemia, with long-term current use of insulin (H)       ertapenem 1 GM vial    INVanz    10 each    Inject 1 g into the vein every 24 hours for 14 days    Parotid abscess       hypromellose-dextran Soln ophthalmic solution     1 Bottle    Place 1 drop into the right eye every hour as needed for dry eyes Apply at least 4 times daily and as needed for dry eye    Dry eyes       insulin aspart 100 UNIT/ML injection    NovoLOG FLEXPEN    24 mL    1 unit per 4 Gms CHO at meals and snacks + correction scale of 1 unit per 25 mg/dL over 125. Average daily dose is  75 units.    Type II diabetes mellitus (H)       insulin degludec 200 UNIT/ML pen    TRESIBA    36 mL    Take 120 units daily.    Type 2 diabetes mellitus with hyperglycemia, with long-term current use of insulin (H)       insulin pen needle 32G X 4 MM    BD VIKTORIA U/F    100 each    Use as directed.    Type II diabetes mellitus (H)       lactobacillus rhamnosus (GG) capsule     28 capsule    Take 1 capsule by mouth 2 times daily for 14 days    Long term (current) use of antibiotics       lactulose 10 GM/15ML solution    CHRONULAC    7200 mL    Take 45 mLs (30 g) by mouth 4 times daily    Liver cirrhosis secondary to ESTRADA (H)       ofloxacin 0.3 % otic solution    FLOXIN    5 mL    Place 5 drops into the right ear 2 times daily for 10 days    Right ear pain       OLANZapine 2.5 MG tablet    zyPREXA    30 tablet    Take 1 tablet (2.5 mg) by mouth At Bedtime    Insomnia, unspecified type       omeprazole 40 MG capsule    priLOSEC    90 capsule    Take 1 capsule (40 mg) by mouth daily    Gastroesophageal reflux disease, esophagitis presence not specified       oxyCODONE IR 5 MG tablet    ROXICODONE    18 tablet    Take 1 tablet (5 mg) by mouth every 3 hours as needed for moderate to severe pain    Acute post-operative pain       potassium chloride SA 20 MEQ CR tablet    K-DUR/KLOR-CON M    90 tablet    Take 1 tablet (20 mEq) by mouth daily    Hypokalemia       pravastatin 10 MG tablet    PRAVACHOL    90 tablet    Take 2 tablets (20 mg) by mouth daily    Hyperlipidemia LDL goal <100       RA VITAMIN B-12 TR 1000 MCG Tbcr   Generic drug:  cyanocobalamin      Take 1,000 mcg by mouth every morning        rifaximin 550 MG Tabs tablet    XIFAXAN    60 tablet    Take 1 tablet (550 mg) by mouth 2 times daily    Hepatic encephalopathy (H)       spironolactone 25 MG tablet    ALDACTONE    90 tablet    Take 1 tablet (25 mg) by mouth daily    Other ascites       THERAVITE PO      Take 1 tablet by mouth every morning        VITAMIN  D-3 PO      Take 2,000 Units by mouth every morning

## 2017-11-10 NOTE — MR AVS SNAPSHOT
After Visit Summary   11/10/2017    Frandy Workman    MRN: [id number]           Patient Information     Date Of Birth          1965        Visit Information        Provider Department      11/10/2017 2:40 PM Asiya Morgan MD [facility] Ear Nose and Throat        Today's Diagnoses     Right ear pain    -  1    Parotid abscess          Care Instructions    1. Please follow-up in clinic in 3 weeks   2. Please call the ENT clinic with any questions,concerns, new or worsening symptoms.    -Clinic number is [phone]   - Gay's direct line (Dr. Morgan's nurse) [phone]    3. Please  your ear drops at the pharmacy at take as directed.           Follow-ups after your visit        Your next 10 appointments already scheduled     Nov 12, 2017  1:00 PM CST   Infusion 60 with UC SPEC INFUSION, UC 49 ATC   [facility] Specialty and Procedure ([facility])    [address] [place] [id number] [phone]            Nov 13, 2017  4:00 PM CST   Infusion 60 with UC SPEC INFUSION, UC 48 ATC   [facility] Specialty and Procedure ([facility])    [address] [address] [phone]            Nov 14, 2017  4:00 PM CST   Infusion 60 with UC SPEC INFUSION, UC 49 ATC   [facility] Specialty and Procedure ([facility])    [address] [address] [phone]            Nov 15, 2017  4:00 PM CST   Infusion 60 with UC SPEC INFUSION, UC 49 ATC   [facility] Specialty and Procedure ([facility])    [address] [phone] [phone]            Nov 16, 2017   Procedure with Milana Malave MD   [facility] Surgery and Procedure Center ([facility])    [facility]  [facility] [address]  5th Floor  [facility] [id number] [phone]           Located in the [facility] at [address] [facility], [place] [address].   parking is very convenient and highly recommended.  is a $6 flat rate fee.  Both  and self parkers should enter the main arrival plaza from [facility]; parking attendants will direct you based on your parking preference.            Nov 16, 2017  4:00 PM CST   Infusion 60 with UC SPEC INFUSION   [facility] Specialty and Procedure ([facility])    [address] [facility]  2nd Floor  [facility] [address] [phone]              Who to contact     Please call your clinic at [phone] to:    Ask questions about your health    Make or cancel appointments    Discuss your medicines    Learn about your test results    Speak to your doctor   If you have compliments or concerns about an experience at your clinic, or if you wish to file a complaint, please contact [facility] Patient Relations at [phone] or email us at [email or web address]         Additional Information About Your Visit        "[employer] Information     [employer] gives you secure access to your electronic health record. If you see a primary care provider, you can also send messages to your care team and make appointments. If you have questions, please call your primary care clinic.  If you do not have a primary care provider, please call [phone] and they will assist you.      [employer] is an electronic gateway that provides easy, online access to your medical records. With [employer], you can request a clinic appointment, read your test results, renew a prescription or communicate with your care team.     To access your existing account, please contact your [facility] or call [phone] for assistance.        Care EveryWhere ID     This is your Care  "EveryWhere ID. This could be used by other organizations to access your [facility] medical records  [id number]        Your Vitals Were     Height BMI (Body Mass Index)                1.75 m (5' 8.9\") 27.99 kg/m2           Blood Pressure from Last 3 Encounters:   11/11/17 (P) 112/58   11/10/17 (P) 101/50   11/09/17 122/50    Weight from Last 3 Encounters:   11/10/17 85.7 kg (189 lb)   11/02/17 86.2 kg (190 lb 0.6 oz)   10/18/17 86.2 kg (190 lb)              Today, you had the following     No orders found for display         Today's Medication Changes          These changes are accurate as of: 11/10/17 11:59 PM.  If you have any questions, ask your nurse or doctor.               Start taking these medicines.        Dose/Directions    ofloxacin 0.3 % otic solution   Commonly known as:  FLOXIN   Used for:  Right ear pain   Started by:  Asiya Morgan MD        Dose:  5 drop   Place 5 drops into the right ear 2 times daily for 10 days   Quantity:  5 mL   Refills:  0         These medicines have changed or have updated prescriptions.        Dose/Directions    dapagliflozin 10 MG Tabs tablet   Commonly known as:  FARXIGA   This may have changed:  when to take this   Used for:  Type 2 diabetes mellitus with hyperglycemia, with long-term current use of insulin (H)        Dose:  10 mg   Take 1 tablet (10 mg) by mouth daily   Quantity:  90 tablet   Refills:  3       insulin degludec 200 UNIT/ML pen   Commonly known as:  TRESIBA   This may have changed:    - how much to take  - how to take this  - when to take this  - additional instructions   Used for:  Type 2 diabetes mellitus with hyperglycemia, with long-term current use of insulin (H)        Take 120 units daily.   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 (H)        Dose:  30 g   Take 45 mLs " (30 g) by mouth 4 times daily   Quantity:  7200 mL   Refills:  11       omeprazole 40 MG capsule   Commonly known as:  priLOSEC   This may have changed:  when to take this   Used for:  Gastroesophageal reflux disease, esophagitis presence not specified        Dose:  40 mg   Take 1 capsule (40 mg) by mouth daily   Quantity:  90 capsule   Refills:  2       potassium chloride SA 20 MEQ CR tablet   Commonly known as:  K-DUR/KLOR-CON M   This may have changed:  when to take this   Used for:  Hypokalemia        Dose:  20 mEq   Take 1 tablet (20 mEq) by mouth daily   Quantity:  90 tablet   Refills:  3       pravastatin 10 MG tablet   Commonly known as:  PRAVACHOL   This may have changed:    - how much to take  - when to take this   Used for:  Hyperlipidemia LDL goal <100        Dose:  20 mg   Take 2 tablets (20 mg) by mouth daily   Quantity:  90 tablet   Refills:  3       spironolactone 25 MG tablet   Commonly known as:  ALDACTONE   This may have changed:  when to take this   Used for:  Other ascites        Dose:  25 mg   Take 1 tablet (25 mg) by mouth daily   Quantity:  90 tablet   Refills:  1            Where to get your medicines      These medications were sent to [facility] [address] [place] [address] & [address], [place] [address]     Phone:  [phone]     ofloxacin 0.3 % otic solution                Primary Care Provider Office Phone # Fax #    Natalie Mitzi Russell [phone]       [address] [place] [address] [address] [place] [address]        Equal Access to Services     MAYCOL RODRIGUEZ AH: Hadii curly huntero Soomaali, waaxda luqadaha, qaybta kaalmada adeegyada, emy vuong. So [facility] [phone].    ATENCIÓN: Si habla español, tiene a hanley disposición servicios gratuitos de asistencia lingüística. Llame al [phone].    We comply with applicable federal civil rights laws and [place] laws. We do not  discriminate on the basis of race, color, national origin, age, disability, sex, sexual orientation, or gender identity.            Thank you!     Thank you for choosing [facility] EAR NOSE AND THROAT  for your care. Our goal is always to provide you with excellent care. Hearing back from our patients is one way we can continue to improve our services. Please take a few minutes to complete the written survey that you may receive in the mail after your visit with us. Thank you!             Your Updated Medication List - Protect others around you: Learn how to safely use, store and throw away your medicines at www.disposemymeds.org.          This list is accurate as of: 11/10/17 11:59 PM.  Always use your most recent med list.                   Brand Name Dispense Instructions for use Diagnosis    artificial tears Oint ophthalmic ointment     5 g    Place 1 g into the right eye At Bedtime    Dry eyes       blood glucose monitoring lancets     408 each    Use to test blood sugar 4 times daily or as directed.  1 box = 102 lancets    Type II diabetes mellitus (H)       blood glucose monitoring test strip    ACCU-CHEK EDINSON PLUS    400 each    Use to test blood sugar 4 times daily    Type II diabetes mellitus (H)       carvedilol 12.5 MG tablet    COREG    180 tablet    Take 1 tablet (12.5 mg) by mouth 2 times daily (with meals)    Liver cirrhosis secondary to ESTRADA (H), Secondary esophageal varices without bleeding (H)       dapagliflozin 10 MG Tabs tablet    FARXIGA    90 tablet    Take 1 tablet (10 mg) by mouth daily    Type 2 diabetes mellitus with hyperglycemia, with long-term current use of insulin (H)       ertapenem 1 GM vial    INVanz    10 each    Inject 1 g into the vein every 24 hours for 14 days    Parotid abscess       hypromellose-dextran Soln ophthalmic solution     1 Bottle    Place 1 drop into the right eye every hour as needed for dry eyes Apply at least 4 times daily and as needed for dry eye    Dry eyes        insulin aspart 100 UNIT/ML injection    NovoLOG FLEXPEN    24 mL    1 unit per 4 Gms CHO at meals and snacks + correction scale of 1 unit per 25 mg/dL over 125. Average daily dose is 75 units.    Type II diabetes mellitus (H)       insulin degludec 200 UNIT/ML pen    TRESIBA    36 mL    Take 120 units daily.    Type 2 diabetes mellitus with hyperglycemia, with long-term current use of insulin (H)       insulin pen needle 32G X 4 MM    BD VIKTORIA U/F    100 each    Use as directed.    Type II diabetes mellitus (H)       lactobacillus rhamnosus (GG) capsule     28 capsule    Take 1 capsule by mouth 2 times daily for 14 days    Long term (current) use of antibiotics       lactulose 10 GM/15ML solution    CHRONULAC    7200 mL    Take 45 mLs (30 g) by mouth 4 times daily    Liver cirrhosis secondary to ESTRADA (H)       ofloxacin 0.3 % otic solution    FLOXIN    5 mL    Place 5 drops into the right ear 2 times daily for 10 days    Right ear pain       OLANZapine 2.5 MG tablet    zyPREXA    30 tablet    Take 1 tablet (2.5 mg) by mouth At Bedtime    Insomnia, unspecified type       omeprazole 40 MG capsule    priLOSEC    90 capsule    Take 1 capsule (40 mg) by mouth daily    Gastroesophageal reflux disease, esophagitis presence not specified       oxyCODONE IR 5 MG tablet    ROXICODONE    18 tablet    Take 1 tablet (5 mg) by mouth every 3 hours as needed for moderate to severe pain    Acute post-operative pain       potassium chloride SA 20 MEQ CR tablet    K-DUR/KLOR-CON M    90 tablet    Take 1 tablet (20 mEq) by mouth daily    Hypokalemia       pravastatin 10 MG tablet    PRAVACHOL    90 tablet    Take 2 tablets (20 mg) by mouth daily    Hyperlipidemia LDL goal <100       RA VITAMIN B-12 TR 1000 MCG Tbcr   Generic drug:  cyanocobalamin      Take 1,000 mcg by mouth every morning        rifaximin 550 MG Tabs tablet    XIFAXAN    60 tablet    Take 1 tablet (550 mg) by mouth 2 times daily    Hepatic encephalopathy (H)        spironolactone 25 MG tablet    ALDACTONE    90 tablet    Take 1 tablet (25 mg) by mouth daily    Other ascites       THERAVITE PO      Take 1 tablet by mouth every morning        VITAMIN D-3 PO      Take 2,000 Units by mouth every morning

## 2017-11-10 NOTE — PROGRESS NOTES
Patient has clinic visit today 11/10 so no post DC follow up call is needed            Date: 11/10/2017 Status: Glenda   Time: 2:40 PM Length: 20     Visit Type: UMP RETURN TUMOR [[id number]]   NIDHI:     Provider: Asiya Morgan MD Department:  ENT GENERAL   Special Needs:   Comments:     Referral Number:   Referral Status:         CSN: [id number]   Notes: post-op 11/2 parotidectomy possible neck      Made On:  Changed:   10/18/2017 4:48 PM  11/6/2017 2:16 PM By:  By:   NIALL HESS [[email or web address]] (ES)  NIALL HESS [[email or web address]] (ES)

## 2017-11-10 NOTE — PATIENT INSTRUCTIONS
1. Please follow-up in clinic in 3 weeks   2. Please call the ENT clinic with any questions,concerns, new or worsening symptoms.    -Clinic number is [phone]   - Gay's direct line (Dr. Morgan's nurse) [phone]    3. Please  your ear drops at the pharmacy at [facility] as directed.

## 2017-11-10 NOTE — NURSING NOTE
Chief Complaint   Patient presents with     RECHECK     post op-parotidectomy-    .Eduardo Castro LPN

## 2017-11-11 ENCOUNTER — INFUSION THERAPY VISIT (OUTPATIENT)
Dept: INFUSION THERAPY | Facility: CLINIC | Age: 52
End: 2017-11-11
Attending: PHYSICIAN ASSISTANT
Payer: MEDICARE

## 2017-11-11 VITALS
DIASTOLIC BLOOD PRESSURE: 51 MMHG | HEART RATE: 85 BPM | SYSTOLIC BLOOD PRESSURE: 118 MMHG | RESPIRATION RATE: 16 BRPM | TEMPERATURE: 97.4 F

## 2017-11-11 DIAGNOSIS — K11.3 PAROTID ABSCESS: Primary | ICD-10-CM

## 2017-11-11 PROCEDURE — 96365 THER/PROPH/DIAG IV INF INIT: CPT

## 2017-11-11 PROCEDURE — 25000128 H RX IP 250 OP 636: Mod: ZF | Performed by: PHYSICIAN ASSISTANT

## 2017-11-11 RX ORDER — ERTAPENEM 1 G/1
1 INJECTION, POWDER, LYOPHILIZED, FOR SOLUTION INTRAMUSCULAR; INTRAVENOUS EVERY 24 HOURS
Status: CANCELLED | OUTPATIENT
Start: 2017-11-12

## 2017-11-11 RX ADMIN — SODIUM CHLORIDE 1 G: 9 INJECTION, SOLUTION INTRAVENOUS at 13:57

## 2017-11-11 NOTE — MR AVS SNAPSHOT
After Visit Summary   11/11/2017    Frandy Workman    MRN: [id number]           Patient Information     Date Of Birth          1965        Visit Information        Provider Department      11/11/2017 2:00 PM UC 44 ATC; UC SPEC INFUSION [facility]        Today's Diagnoses     Parotid abscess    -  1       Follow-ups after your visit        Your next 10 appointments already scheduled     Nov 12, 2017  1:00 PM CST   Infusion 60 with UC SPEC INFUSION, UC 49 ATC   [facility] ([facility])    [address] [place] [address]  2nd [facility] [id number] [phone]            Nov 13, 2017  4:00 PM CST   Infusion 60 with UC SPEC INFUSION, UC 48 ATC   [facility] ([facility])    [address] [place] [id number] [phone]            Nov 14, 2017  4:00 PM CST   Infusion 60 with UC SPEC INFUSION, UC 49 ATC   [facility] ([facility])    [address] [place] [address]  2nd [facility] [address] [phone]            Nov 15, 2017  4:00 PM CST   Infusion 60 with UC SPEC INFUSION, UC 49 ATC   [facility] ([facility])    [address] [place] [id number] [phone]            Nov 16, 2017   Procedure with Milana Malave MD   [facility] ([facility])    [address] [place] [address]  5th [facility] [address] [phone]           Located in the Clinics and Surgery Center at [address] [place] [address].   parking is very convenient and highly recommended.  is a $6 flat rate fee.  Both  and self parkers should enter the main arrival  kasey from [facility]; parking attendants will direct you based on your parking preference.            Nov 16, 2017  4:00 PM CST   Infusion 60 with UC SPEC INFUSION   [facility] ([facility])    [address] [facility] [address]  2nd Floor  [facility] [address] [phone]              Who to contact     If you have questions or need follow up information about today's clinic visit or your schedule please contact [facility] directly at [phone].  Normal or non-critical lab and imaging results will be communicated to you by [employer], letter or phone within 4 business days after the clinic has received the results. If you do not hear from us within 7 days, please contact the clinic through [employer] or phone. If you have a critical or abnormal lab result, we will notify you by phone as soon as possible.  Submit refill requests through [employer] or call your pharmacy and they will forward the refill request to us. Please allow 3 business days for your refill to be completed.          Additional Information About Your Visit        [employer] Information     [employer] gives you secure access to your electronic health record. If you see a primary care provider, you can also send messages to your care team and make appointments. If you have questions, please call your primary care clinic.  If you do not have a primary care provider, please call [phone] and they will assist you.        Care EveryWhere ID     This is your Care EveryWhere ID. This could be used by other organizations to access your [place] medical records  [id number]        Your Vitals Were     Pulse Temperature Respirations             85 97.4  F (36.3  C) (Oral) 16          Blood Pressure from Last 3 Encounters:   11/11/17 (P) 112/58   11/10/17 (P) 101/50   11/09/17 122/50    Weight from Last 3 Encounters:   11/10/17 85.7 kg (189 lb)    11/02/17 86.2 kg (190 lb 0.6 oz)   10/18/17 86.2 kg (190 lb)              Today, you had the following     No orders found for display         Today's Medication Changes          These changes are accurate as of: 11/11/17  2:56 PM.  If you have any questions, ask your nurse or doctor.               These medicines have changed or have updated prescriptions.        Dose/Directions    dapagliflozin 10 MG Tabs tablet   Commonly known as:  FARXIGA   This may have changed:  when to take this   Used for:  Type 2 diabetes mellitus with hyperglycemia, with long-term current use of insulin (H)        Dose:  10 mg   Take 1 tablet (10 mg) by mouth daily   Quantity:  90 tablet   Refills:  3       insulin degludec 200 UNIT/ML pen   Commonly known as:  TRESIBA   This may have changed:    - how much to take  - how to take this  - when to take this  - additional instructions   Used for:  Type 2 diabetes mellitus with hyperglycemia, with long-term current use of insulin (H)        Take 120 units daily.   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 (H)        Dose:  30 g   Take 45 mLs (30 g) by mouth 4 times daily   Quantity:  7200 mL   Refills:  11       omeprazole 40 MG capsule   Commonly known as:  priLOSEC   This may have changed:  when to take this   Used for:  Gastroesophageal reflux disease, esophagitis presence not specified        Dose:  40 mg   Take 1 capsule (40 mg) by mouth daily   Quantity:  90 capsule   Refills:  2       potassium chloride SA 20 MEQ CR tablet   Commonly known as:  K-DUR/KLOR-CON M   This may have changed:  when to take this   Used for:  Hypokalemia        Dose:  20 mEq   Take 1 tablet (20 mEq) by mouth daily   Quantity:  90 tablet   Refills:  3       pravastatin 10 MG tablet   Commonly known as:  PRAVACHOL   This may have  changed:    - how much to take  - when to take this   Used for:  Hyperlipidemia LDL goal <100        Dose:  20 mg   Take 2 tablets (20 mg) by mouth daily   Quantity:  90 tablet   Refills:  3       spironolactone 25 MG tablet   Commonly known as:  ALDACTONE   This may have changed:  when to take this   Used for:  Other ascites        Dose:  25 mg   Take 1 tablet (25 mg) by mouth daily   Quantity:  90 tablet   Refills:  1                Primary Care Provider Office Phone # Fax #    Natalie Mitzi Russell [phone]       [address] [place] [address] [address] [place] [address]        Equal Access to Services     [facility] : Hadii curly giraldo hadasho Somichael, waaxda luqadaha, qaybta kaalmada leonard, emy mcdonald . [facility] [phone].    ATENCIÓN: Si habla español, tiene a hanley disposición servicios gratuitos de asistencia lingüística. [facility] [phone].    We comply with applicable federal civil rights laws and [place] laws. We do not discriminate on the basis of race, color, national origin, age, disability, sex, sexual orientation, or gender identity.            Thank you!     Thank you for choosing [facility]  for your care. Our goal is always to provide you with excellent care. Hearing back from our patients is one way we can continue to improve our services. Please take a few minutes to complete the written survey that you may receive in the mail after your visit with us. Thank you!             Your Updated Medication List - Protect others around you: Learn how to safely use, store and throw away your medicines at www.disposemymeds.org.          This list is accurate as of: 11/11/17  2:56 PM.  Always use your most recent med list.                   Brand Name Dispense Instructions for use Diagnosis    artificial tears Oint ophthalmic ointment     5 g    Place 1 g into the right eye At Bedtime    Dry eyes       blood  glucose monitoring lancets     408 each    Use to test blood sugar 4 times daily or as directed.  1 box = 102 lancets    Type II diabetes mellitus (H)       blood glucose monitoring test strip    ACCU-CHEK EDINSON PLUS    400 each    Use to test blood sugar 4 times daily    Type II diabetes mellitus (H)       carvedilol 12.5 MG tablet    COREG    180 tablet    Take 1 tablet (12.5 mg) by mouth 2 times daily (with meals)    Liver cirrhosis secondary to ESTRADA (H), Secondary esophageal varices without bleeding (H)       dapagliflozin 10 MG Tabs tablet    FARXIGA    90 tablet    Take 1 tablet (10 mg) by mouth daily    Type 2 diabetes mellitus with hyperglycemia, with long-term current use of insulin (H)       ertapenem 1 GM vial    INVanz    10 each    Inject 1 g into the vein every 24 hours for 14 days    Parotid abscess       hypromellose-dextran Soln ophthalmic solution     1 Bottle    Place 1 drop into the right eye every hour as needed for dry eyes Apply at least 4 times daily and as needed for dry eye    Dry eyes       insulin aspart 100 UNIT/ML injection    NovoLOG FLEXPEN    24 mL    1 unit per 4 Gms CHO at meals and snacks + correction scale of 1 unit per 25 mg/dL over 125. Average daily dose is 75 units.    Type II diabetes mellitus (H)       insulin degludec 200 UNIT/ML pen    TRESIBA    36 mL    Take 120 units daily.    Type 2 diabetes mellitus with hyperglycemia, with long-term current use of insulin (H)       insulin pen needle 32G X 4 MM    BD VIKTORIA U/F    100 each    Use as directed.    Type II diabetes mellitus (H)       lactobacillus rhamnosus (GG) capsule     28 capsule    Take 1 capsule by mouth 2 times daily for 14 days    Long term (current) use of antibiotics       lactulose 10 GM/15ML solution    CHRONULAC    7200 mL    Take 45 mLs (30 g) by mouth 4 times daily    Liver cirrhosis secondary to ESTRADA (H)       ofloxacin 0.3 % otic solution    FLOXIN    5 mL    Place 5 drops into the right ear 2 times  daily for 10 days    Right ear pain       OLANZapine 2.5 MG tablet    zyPREXA    30 tablet    Take 1 tablet (2.5 mg) by mouth At Bedtime    Insomnia, unspecified type       omeprazole 40 MG capsule    priLOSEC    90 capsule    Take 1 capsule (40 mg) by mouth daily    Gastroesophageal reflux disease, esophagitis presence not specified       oxyCODONE IR 5 MG tablet    ROXICODONE    18 tablet    Take 1 tablet (5 mg) by mouth every 3 hours as needed for moderate to severe pain    Acute post-operative pain       potassium chloride SA 20 MEQ CR tablet    K-DUR/KLOR-CON M    90 tablet    Take 1 tablet (20 mEq) by mouth daily    Hypokalemia       pravastatin 10 MG tablet    PRAVACHOL    90 tablet    Take 2 tablets (20 mg) by mouth daily    Hyperlipidemia LDL goal <100       RA VITAMIN B-12 TR 1000 MCG Tbcr   Generic drug:  cyanocobalamin      Take 1,000 mcg by mouth every morning        rifaximin 550 MG Tabs tablet    XIFAXAN    60 tablet    Take 1 tablet (550 mg) by mouth 2 times daily    Hepatic encephalopathy (H)       spironolactone 25 MG tablet    ALDACTONE    90 tablet    Take 1 tablet (25 mg) by mouth daily    Other ascites       THERAVITE PO      Take 1 tablet by mouth every morning        VITAMIN D-3 PO      Take 2,000 Units by mouth every morning

## 2017-11-11 NOTE — PROGRESS NOTES
Nursing Note  Frandy Workman [facility]:   Chief Complaint   Patient presents with     Infusion     IV Ertapenem     During [facility]'s [facility] appointment, orders from Kera Dillon PA-C were completed.  Frequency: daily    Progress note:  Patient identification verified by name and date of birth.  Assessment completed.  Vitals recorded in Doc Flowsheets.  Patient was provided with education regarding infusion and possible side effects.  Patient verbalized understanding.      needed: No  Premedications: were not ordered.  Infusion Rates: infusion given over approximately 30 minutes.  Labs: were not ordered for this appointment.  Vascular access: PICC accessed today.  Treatment Conditions: non-applicable.  Patient tolerated infusion: well.    Discharge Plan:   Follow up plan of care with: ongoing infusions at [facility].  Discharge instructions were reviewed with patient.  Patient/representative verbalized understanding of discharge instructions and all questions answered.  [facility] discharged [facility] in stable condition.    Carmelo Melgar RN    Administrations This Visit     ertapenem (INVanz) 1 g in NaCl 0.9 % 50 mL intermittent infusion     Admin Date Action Dose Route Administered By             11/11/2017 New Bag 1 g Intravenous Carmelo Melgar RN                          BP (P) 112/58 (BP Location: Right arm)  Pulse (P) 79  Temp 97.4  F (36.3  C) (Oral)  Resp (P) 16

## 2017-11-12 ENCOUNTER — INFUSION THERAPY VISIT (OUTPATIENT)
Dept: INFUSION THERAPY | Facility: CLINIC | Age: 52
End: 2017-11-12
Attending: PHYSICIAN ASSISTANT
Payer: MEDICARE

## 2017-11-12 VITALS
OXYGEN SATURATION: 97 % | DIASTOLIC BLOOD PRESSURE: 52 MMHG | SYSTOLIC BLOOD PRESSURE: 115 MMHG | HEART RATE: 80 BPM | RESPIRATION RATE: 18 BRPM | TEMPERATURE: 97.7 F

## 2017-11-12 DIAGNOSIS — K11.3 PAROTID ABSCESS: Primary | ICD-10-CM

## 2017-11-12 PROCEDURE — 25000128 H RX IP 250 OP 636: Mod: ZF | Performed by: PHYSICIAN ASSISTANT

## 2017-11-12 PROCEDURE — 96365 THER/PROPH/DIAG IV INF INIT: CPT

## 2017-11-12 RX ORDER — ERTAPENEM 1 G/1
1 INJECTION, POWDER, LYOPHILIZED, FOR SOLUTION INTRAMUSCULAR; INTRAVENOUS EVERY 24 HOURS
Status: CANCELLED | OUTPATIENT
Start: 2017-11-13

## 2017-11-12 RX ADMIN — SODIUM CHLORIDE 1 G: 9 INJECTION, SOLUTION INTRAVENOUS at 13:15

## 2017-11-12 ASSESSMENT — PAIN SCALES - GENERAL: PAINLEVEL: SEVERE PAIN (6)

## 2017-11-12 NOTE — MR AVS SNAPSHOT
After Visit Summary   11/12/2017    Frandy Workman    MRN: [id number]           Patient Information     Date Of Birth          1965        Visit Information        Provider Department      11/12/2017 1:00 PM UC 49 ATC; UC SPEC INFUSION [facility] Procedure        Today's Diagnoses     Parotid abscess    -  1       Follow-ups after your visit        Your next 10 appointments already scheduled     Nov 13, 2017  4:00 PM CST   Infusion 60 with UC SPEC INFUSION, UC 48 ATC   [facility] Special[facility] Procedure ([facility] [place])    [address] [place] [address]  2nd [facility] [address]   [phone]            Nov 14, 2017  4:00 PM CST   Infusion 60 with UC SPEC INFUSION, UC 49 ATC   [facility] Procedure ([facility] [place])    [address] [place] [address]  2nd [facility] [address] [phone]            Nov 15, 2017  4:00 PM CST   Infusion 60 with UC SPEC INFUSION, UC 49 ATC   [facility] Procedure ([facility] [place])    [address] [place] [address]  2nd [facility] [address] [phone]            Nov 16, 2017   Procedure with Milana Malave MD   [facility] ([facility] [place])    [address] [place] [address]  5th [facility] [address] [phone]           Located in the Clinics and Surgery Center at [address] [place] [address].   parking is very convenient and highly recommended.  is a $6 flat rate fee.  Both  and self parkers should enter the main arrival plaza from [facility]; parking attendants will direct you based on your parking preference.            Nov 16, 2017  4:00 PM CST   Infusion 60 with UC SPEC INFUSION, UC 45 ATC   [facility] Special[facility] Procedure ([facility])    [address]  2nd Floor  [facility] [address] [phone]            Nov 17, 2017   Procedure with Santi Dotson MD   [facility], [facility] [place], Endoscopy ([facility])    [address]  [place] [id number] [phone]           The [facility] is located on the corner of [facility] [facility] and [facility] on the [facility]. It is easily accessible from virtually any point in the [facility] area, via [address] and [id number].              Who to contact     If you have questions or need follow up information about today's clinic visit or your schedule please contact [facility] SPECIALTY AND PROCEDURE directly at [phone].  Normal or non-critical lab and imaging results will be communicated to you by MyChart, letter or phone within 4 business days after the clinic has received the results. If you do not hear from us within 7 days, please contact the clinic through MyChart or phone. If you have a critical or abnormal lab result, we will notify you by phone as soon as possible.  Submit refill requests through [facility] or call your pharmacy and they will forward the refill request to us. Please allow 3 business days for your refill to be completed.          Additional Information About Your Visit        [facility] Information     [employer] gives you secure access to your electronic health record. If you see a primary care provider, you can also send messages to your care team and make appointments. If you have questions, please call your primary care clinic.  If you do not have a primary care provider, please call [phone] and they will assist you.        Care EveryWhere ID     This is your Care EveryWhere ID. This could be used by other organizations to access your [facility] [place] medical records  [id number]        Your Vitals Were     Pulse Temperature Respirations Pulse Oximetry           80 97.7  F (36.5  C) (Oral) 18 97%         Blood Pressure from Last 3 Encounters:   11/12/17 115/52   11/11/17 (P) 112/58   11/10/17 (P) 101/50    Weight from Last 3 Encounters:   11/10/17 85.7 kg (189 lb)   11/02/17 86.2 kg (190 lb 0.6 oz)   10/18/17 86.2 kg (190 lb)              Today, you had the following     No orders found for display         Today's Medication Changes          These changes are accurate as of: 11/12/17  2:00 PM.  If you have any questions, ask your nurse or doctor.               These medicines have changed or have updated prescriptions.        Dose/Directions    dapagliflozin 10 MG Tabs tablet   Commonly known as:  FARXIGA   This may have changed:  when to take this   Used for:  Type 2 diabetes mellitus with hyperglycemia, with long-term current use of insulin (H)        Dose:  10 mg   Take 1 tablet (10 mg) by mouth daily   Quantity:  90 tablet   Refills:  3       insulin degludec 200 UNIT/ML pen   Commonly known as:  TRESIBA   This may have changed:    - how much to take  - how to take this  - when to take this  - additional instructions   Used for:  Type 2 diabetes mellitus with hyperglycemia, with long-term current use of insulin (H)        Take 120 units daily.   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 (H)        Dose:  30 g   Take 45 mLs (30 g) by mouth 4 times daily   Quantity:  7200 mL   Refills:  11       omeprazole 40 MG capsule   Commonly known as:  priLOSEC   This may have changed:  when to take this   Used for:  Gastroesophageal reflux disease, esophagitis presence not specified        Dose:  40 mg   Take 1 capsule (40 mg) by mouth daily   Quantity:  90 capsule   Refills:  2       potassium chloride SA 20 MEQ CR tablet   Commonly known as:  K-DUR/KLOR-CON M   This may have changed:  when to take this    Used for:  Hypokalemia        Dose:  20 mEq   Take 1 tablet (20 mEq) by mouth daily   Quantity:  90 tablet   Refills:  3       pravastatin 10 MG tablet   Commonly known as:  PRAVACHOL   This may have changed:    - how much to take  - when to take this   Used for:  Hyperlipidemia LDL goal <100        Dose:  20 mg   Take 2 tablets (20 mg) by mouth daily   Quantity:  90 tablet   Refills:  3       spironolactone 25 MG tablet   Commonly known as:  ALDACTONE   This may have changed:  when to take this   Used for:  Other ascites        Dose:  25 mg   Take 1 tablet (25 mg) by mouth daily   Quantity:  90 tablet   Refills:  1                Primary Care Provider Office Phone # Fax #    Natalie Mitzi Andrés Russell [phone]       [address] [place] [address] [address] [place] [id number]        Equal Access to Services     MINDI RODRIGUEZ : Amelia Vanessa, waaxluis luqjacek, qaybta kaalmada leonard, emy mcdonald . So [facility] [phone].    ATENCIÓN: Si habla español, tiene a hanley disposición servicios gratuitos de asistencia lingüística. Rl al [phone].    We comply with applicable federal civil rights laws and [place] laws. We do not discriminate on the basis of race, color, national origin, age, disability, sex, sexual orientation, or gender identity.            Thank you!     Thank you for choosing [facility] SPECIALTY [facility] PROCEDURE  for your care. Our goal is always to provide you with excellent care. Hearing back from our patients is one way we can continue to improve our services. Please take a few minutes to complete the written survey that you may receive in the mail after your visit with us. Thank you!             Your Updated Medication List - Protect others around you: Learn how to safely use, store and throw away your medicines at www.disposemymeds.org.          This list is accurate as of: 11/12/17  2:00 PM.  Always use your most  recent med list.                   Brand Name Dispense Instructions for use Diagnosis    artificial tears Oint ophthalmic ointment     5 g    Place 1 g into the right eye At Bedtime    Dry eyes       blood glucose monitoring lancets     408 each    Use to test blood sugar 4 times daily or as directed.  1 box = 102 lancets    Type II diabetes mellitus (H)       blood glucose monitoring test strip    ACCU-CHEK EDINSON PLUS    400 each    Use to test blood sugar 4 times daily    Type II diabetes mellitus (H)       carvedilol 12.5 MG tablet    COREG    180 tablet    Take 1 tablet (12.5 mg) by mouth 2 times daily (with meals)    Liver cirrhosis secondary to ESTRADA (H), Secondary esophageal varices without bleeding (H)       dapagliflozin 10 MG Tabs tablet    FARXIGA    90 tablet    Take 1 tablet (10 mg) by mouth daily    Type 2 diabetes mellitus with hyperglycemia, with long-term current use of insulin (H)       ertapenem 1 GM vial    INVanz    10 each    Inject 1 g into the vein every 24 hours for 14 days    Parotid abscess       hypromellose-dextran Soln ophthalmic solution     1 Bottle    Place 1 drop into the right eye every hour as needed for dry eyes Apply at least 4 times daily and as needed for dry eye    Dry eyes       insulin aspart 100 UNIT/ML injection    NovoLOG FLEXPEN    24 mL    1 unit per 4 Gms CHO at meals and snacks + correction scale of 1 unit per 25 mg/dL over 125. Average daily dose is 75 units.    Type II diabetes mellitus (H)       insulin degludec 200 UNIT/ML pen    TRESIBA    36 mL    Take 120 units daily.    Type 2 diabetes mellitus with hyperglycemia, with long-term current use of insulin (H)       insulin pen needle 32G X 4 MM    BD VIKTORIA U/F    100 each    Use as directed.    Type II diabetes mellitus (H)       lactobacillus rhamnosus (GG) capsule     28 capsule    Take 1 capsule by mouth 2 times daily for 14 days    Long term (current) use of antibiotics       lactulose 10 GM/15ML solution     CHRONULAC    7200 mL    Take 45 mLs (30 g) by mouth 4 times daily    Liver cirrhosis secondary to ESTRADA (H)       ofloxacin 0.3 % otic solution    FLOXIN    5 mL    Place 5 drops into the right ear 2 times daily for 10 days    Right ear pain       OLANZapine 2.5 MG tablet    zyPREXA    30 tablet    Take 1 tablet (2.5 mg) by mouth At Bedtime    Insomnia, unspecified type       omeprazole 40 MG capsule    priLOSEC    90 capsule    Take 1 capsule (40 mg) by mouth daily    Gastroesophageal reflux disease, esophagitis presence not specified       oxyCODONE IR 5 MG tablet    ROXICODONE    18 tablet    Take 1 tablet (5 mg) by mouth every 3 hours as needed for moderate to severe pain    Acute post-operative pain       potassium chloride SA 20 MEQ CR tablet    K-DUR/KLOR-CON M    90 tablet    Take 1 tablet (20 mEq) by mouth daily    Hypokalemia       pravastatin 10 MG tablet    PRAVACHOL    90 tablet    Take 2 tablets (20 mg) by mouth daily    Hyperlipidemia LDL goal <100       RA VITAMIN B-12 TR 1000 MCG Tbcr   Generic drug:  cyanocobalamin      Take 1,000 mcg by mouth every morning        rifaximin 550 MG Tabs tablet    XIFAXAN    60 tablet    Take 1 tablet (550 mg) by mouth 2 times daily    Hepatic encephalopathy (H)       spironolactone 25 MG tablet    ALDACTONE    90 tablet    Take 1 tablet (25 mg) by mouth daily    Other ascites       THERAVITE PO      Take 1 tablet by mouth every morning        VITAMIN D-3 PO      Take 2,000 Units by mouth every morning

## 2017-11-12 NOTE — PROGRESS NOTES
Nursing Note  [facility] Workman [facility]:   Chief Complaint   Patient presents with     Infusion     IV Ertapenem     During [facility]'s [facility] appointment, orders from Kera Dillon PA-C were completed.  Frequency: daily    Progress note:  Patient identification verified by name and date of birth.  Assessment completed.  Vitals recorded in Doc Flowsheets.  Patient was provided with education regarding infusion and possible side effects.  Patient verbalized understanding.      needed: No  Premedications: were not ordered.  Infusion Rates: infusion given over approximately 30 minutes.  Labs: were not ordered for this appointment.  Vascular access: PICC accessed today.  Treatment Conditions: non-applicable.  Patient tolerated infusion: well.    Discharge Plan:   Follow up plan of care with: ongoing infusions at [facility].  Discharge instructions were reviewed with patient.  Patient/representative verbalized understanding of discharge instructions and all questions answered.  [facility] in stable condition.    Reema Isbell RN    Administrations This Visit     ertapenem (INVanz) 1 g in NaCl 0.9 % 50 mL intermittent infusion     Admin Date Action Dose Route Administered By             11/12/2017 New Bag 1 g Intravenous Reema Isbell RN                          /52  Pulse 80  Temp 97.7  F (36.5  C) (Oral)  Resp 18  SpO2 97%

## 2017-11-12 NOTE — PROGRESS NOTES
Dear Dr. Rueda:    I had the pleasure of seeing Frandy Wormkan in follow-up today at the [facility].     History of Present Illness:   Frandy Workman is a 52-year-old gentleman who was initially referred for evaluation of a right parotid lesion. The mass had been present for approximately 2 months and the patient had initially attributed it to dental issues. He was evaluated by Dr. Rueda who obtained a CT scan which showed a mass of the right parotid region. The patient was having significant pain associated with the mass. He underwent an FNA in clinic that showed inflammatory cells and no malignancy. He was placed on a course of augmentin and sent for image guided FNA of the mass. The mass again was nondiagnostic with just inflammatory cells but the mass appeared to have increased in size on the U/S performed for the FNA. His case was discussed at tumor board and the recommendation was for surgical excision given his history of skin cancer in the right temporal region as well as his anticipation of potentially being placed on the transplant list for his liver.     He was taken to the OR on 11/2/2017 for a parotidectomy. Intraoperatively there was expression of purulence from the mass, intraoperative cytology and frozen sections were consistent with an abscess. The abscess was encasing the superior division of the facial nerve. The nerve was preserved in the OR. Cultures showed strep that was penicillin sensitive. Postoperatively ID was consulted and recommended a prolonged course of IV antibiotics. He was discharged with a PICC and ertapenem. He is getting daily infusions without issue. He is doing his eye care daily. His pain is controlled.      MEDICATIONS:     Current Outpatient Prescriptions   Medication Sig Dispense Refill     ofloxacin (FLOXIN) 0.3 % otic solution Place 5 drops into the right ear 2 times daily for 10 days 5 mL 0     lactobacillus rhamnosus, GG,  (CULTURELL) capsule Take 1 capsule by mouth 2 times daily for 14 days 28 capsule 0     ertapenem (INVANZ) 1 GM vial Inject 1 g into the vein every 24 hours for 14 days 10 each      artificial tears OINT ophthalmic ointment Place 1 g into the right eye At Bedtime 5 g 3     hypromellose-dextran (ARTIFICAL TEARS) SOLN ophthalmic solution Place 1 drop into the right eye every hour as needed for dry eyes Apply at least 4 times daily and as needed for dry eye 1 Bottle 3     dapagliflozin (FARXIGA) 10 MG TABS tablet Take 1 tablet (10 mg) by mouth daily (Patient taking differently: Take 10 mg by mouth every morning ) 90 tablet 3     pravastatin (PRAVACHOL) 10 MG tablet Take 2 tablets (20 mg) by mouth daily (Patient taking differently: Take 10 mg by mouth every morning ) 90 tablet 3     blood glucose monitoring (ACCU-CHEK EDINSON PLUS) test strip Use to test blood sugar 4 times daily 400 each 3     blood glucose monitoring (ACCU-CHEK FASTCLIX) lancets Use to test blood sugar 4 times daily or as directed.  1 box = 102 lancets 408 each 3     rifaximin (XIFAXAN) 550 MG TABS tablet Take 1 tablet (550 mg) by mouth 2 times daily 60 tablet 11     lactulose (CHRONULAC) 10 GM/15ML solution Take 45 mLs (30 g) by mouth 4 times daily (Patient taking differently: Take 60 g by mouth 4 times daily 2-6 TIMES A DAY) 7200 mL 11     insulin degludec (TRESIBA) 200 UNIT/ML pen Take 120 units daily. (Patient taking differently: Inject 110 Units Subcutaneous every morning Take 110 units dailY AS OF 10/19/17) 36 mL 3     spironolactone (ALDACTONE) 25 MG tablet Take 1 tablet (25 mg) by mouth daily (Patient taking differently: Take 25 mg by mouth every morning ) 90 tablet 1     insulin pen needle (BD VIKTORIA U/F) 32G X 4 MM Use as directed. 100 each 11     carvedilol (COREG) 12.5 MG tablet Take 1 tablet (12.5 mg) by mouth 2 times daily (with meals) 180 tablet 3     omeprazole (PRILOSEC) 40 MG capsule Take 1 capsule (40 mg) by mouth daily (Patient taking  differently: Take 40 mg by mouth every evening ) 90 capsule 2     OLANZapine (ZYPREXA) 2.5 MG tablet Take 1 tablet (2.5 mg) by mouth At Bedtime 30 tablet 5     potassium chloride SA (K-DUR/KLOR-CON M) 20 MEQ CR tablet Take 1 tablet (20 mEq) by mouth daily (Patient taking differently: Take 20 mEq by mouth every morning ) 90 tablet 3     Cholecalciferol (VITAMIN D-3 PO) Take 2,000 Units by mouth every morning        insulin aspart (NOVOLOG FLEXPEN) 100 UNIT/ML injection 1 unit per 4 Gms CHO at meals and snacks + correction scale of 1 unit per 25 mg/dL over 125. Average daily dose is 75 units. 24 mL 11     cyanocobalamin (RA VITAMIN B-12 TR) 1000 MCG TBCR Take 1,000 mcg by mouth every morning        Multiple Vitamin (THERAVITE PO) Take 1 tablet by mouth every morning        oxyCODONE IR (ROXICODONE) 5 MG tablet Take 1 tablet (5 mg) by mouth every 3 hours as needed for moderate to severe pain (Patient not taking: Reported on 11/10/2017) 18 tablet 0       ALLERGIES:    Allergies   Allergen Reactions     Codeine Other (See Comments)     Cannot take due to liver         HABITS/SOCIAL HISTORY:   Chewing tobacco use  Quit alcohol in   Wife   Daughter lives in [place]    Social History     Social History     Marital status:      Spouse name: N/A     Number of children: N/A     Years of education: N/A     Occupational History     Not on file.     Social History Main Topics     Smoking status: Former Smoker     Packs/day: 6.00     Years: 30.00     Types: Cigars     Start date: 2016     Quit date: 5/15/2013     Smokeless tobacco: Current User     Types: Chew      Comment: 1 tin per week     Alcohol use No      Comment: quit 1996     Drug use: No     Sexual activity: Not Currently     Partners: Female     Birth control/ protection: Condom     Other Topics Concern     Not on file     Social History Narrative       PAST MEDICAL HISTORY:   Past Medical History:   Diagnosis Date     Anemia     Low  blood plates current is 37     BPH (benign prostatic hyperplasia)      Cholelithiasis      Conductive hearing loss 2017    Have a lump on my right side of my face.  Had wax discharge     Depressive disorder     Suffer effects throughout life     Gastroesophageal reflux disease 2014    Being treated with Prilosac     Hepatitis     Diagnosed with schrosis ESTRADA in .  Suffer from hepatatie     Hyperlipidemia      Liver cirrhosis secondary to ESTRADA (H)      Type II diabetes mellitus (H)         PAST SURGICAL HISTORY:   Past Surgical History:   Procedure Laterality Date     ESOPHAGOSCOPY, GASTROSCOPY, DUODENOSCOPY (EGD), COMBINED N/A 2016    Procedure: COMBINED ESOPHAGOSCOPY, GASTROSCOPY, DUODENOSCOPY (EGD);  Surgeon: Santi Rosas MD;  Location: [facility]     KNEE SURGERY Left      PAROTIDECTOMY, RADICAL NECK DISSECTION Right 2017    Procedure: PAROTIDECTOMY, RADICAL NECK DISSECTION;  Right Superfacial Parotidectomy , Facial nerve repair. with NIM facial nerve monitor.;  Surgeon: Asiya Morgan MD;  Location: [facility] OR     PICC INSERTION Left 2017    4fr SL BioFlo PICC, 44cm in the L basilic vein w/ tip in the low SVC       FAMILY HISTORY:    Family History   Problem Relation Age of Onset     Prostate Cancer Maternal Grandfather      Substance Abuse Maternal Grandfather      Alcohol     Colon Cancer Father 60     Pancreatic Cancer Father 60     Prostate Cancer Father      Colorectal Cancer Father      Macular Degeneration Father      CANCER Father      Colorectal Cancer Maternal Grandmother      CANCER Maternal Grandmother      Substance Abuse Maternal Grandmother      Alcohol     Colorectal Cancer Paternal Grandmother      CANCER Mother      DIABETES Mother       3/2016     CEREBROVASCULAR DISEASE Mother      Passed away in Feb of this year, 80 years old.     Thyroid Disease Mother      Depression Mother      Asthma Sister      Had since birth     Thyroid Disease  "Sister      Depression Sister      Liver Disease No family hx of        REVIEW OF SYSTEMS:  12 point ROS was negative other than the symptoms noted above in the HPI.  Patient Supplied Answers to Review of Systems  [facility] ROS 11/7/2017   Constitutional Appetite change, Problems with sleep   Neurology Numbness   Eyes -   Ears, Nose, Throat Ear pain, Ringing/noise in ears, Nasal congestion or drainage   Cardiopulmonary -   Gastrointestinal/Genitourinary Unexplained nausea/vomiting   Musculoskeletal Swollen legs/feet   Allergy/Immunology Allergies or hay fever   Hematologic Bleeding problems   Endocrine Thirst   Skin -         PHYSICAL EXAMINATION:   Ht 1.75 m (5' 8.9\")  Wt 85.7 kg (189 lb)  BMI 27.99 kg/m2   Patient in NAD  Jaw bra in place  Incision healing appropriately with prolene sutures in place, incision flat, no fluid collection, no fluctuance  Incomplete eye closure on right, no movement in right brow, some movement in lower face but weak  Dried blood in left EAC    RESULTS REVIEWED:     SPECIMEN(S):   A: Tissue of skin flap over parotid mass   B: No specimen recieved from OR   C: Aspirate collected for cytology rapid read   D: Tissue over temporalis   E: Biopsy of parotid mass   F: Right parotidectomy     FINAL DIAGNOSIS:   A. TISSUE OF SKIN FLAP OVER PAROTID MASS, BIOPSY:   - Focal chronic inflammation, negative for malignancy     B. NO SPECIMEN RECEIVED FROM OR     C. ASPIRATE COLLECTED FOR CYTOLOGY RAPID EVALUATION   - Please see cytology report     D. TISSUE OVER TEMPORALIS, BIOPSY:   - Fibrovascular tissue with acute and chronic inflammation   - Negative for malignancy     E. PAROTID MASS, BIOPSY:   - Acute and chronic inflammation with focal giant cell reaction   - Negative for malignancy     F. PAROTID GLAND, RIGHT PAROTIDECTOMY:   - Acute inflammation and granulation tissue with abscess formation   - No malignancy identified      CYTOLOGIC INTERPRETATION:      Miscellaneous fluid, Right Parotid " Mass:   Negative for malignancy   Abundant acute inflammation present.   Specimen Adequacy: Satisfactory for evaluation.     COMMENT:   The specimen consists of only acute inflammation and debris.   Correlation with associated surgical specimen [id number] is recommended.       Culture & Susceptibility   STREPTOCOCCUS ANGINOSUS   Antibiotic Interpretation Sensitivity Unit Method Status   AMPICILLIN Sensitive 0.12 ug/mL NICHOLE Final   CEFOTAXIME Sensitive <=0.25 ug/mL NICHOLE Final   CEFTRIAXONE Sensitive <=0.25 ug/mL NICHOLE Final   CLINDAMYCIN Sensitive <=0.06 ug/mL NICHOLE Final   MEROPENEM Sensitive <=0.06 ug/mL NICHOLE Final   PENICILLIN Sensitive 0.06 ug/mL NICHOLE Final   VANCOMYCIN Sensitive 0.5 ug/mL NICHOLE Final              IMPRESSION AND PLAN:   Frandy Workman is a 52-year-old gentleman with a right-sided parotid mass s/p superficial parotidectomy. Final pathology was consistent with an abscess growing penicillin sensitive strep. He is currently undergoing a prolonged course of IV antibiotics. He is healing appropriately without evidence of recurrence of the abscess. Sutures were removed today. He should follow-up with ID as scheduled. I will see him back in about 3 weeks at which point he will have undergone approximately 1 month of antibiotic therapy. In the meantime we will work on scheduling him for an eyelid weight with Dr Malave due to his incomplete eye closure. He should continue eye care as instructed. He was also given a week course of floxin drops for his ear to help with the crusted blood within the EAC which is causing him discomfort.    Thank you very much for the opportunity to participate in the care of your patient.      Asiya Morgan M.D.  Otolaryngology- Head & Neck Surgery          CC:  Natalie Russell MD  [address]  [facility] [address]        Sheba Rueda MD  Otolaryngology/Head & Neck Surgery  [facility] [address]      Sergio Noel MD  Department of Infectious Disease  [facility]  [place]

## 2017-11-13 ENCOUNTER — ANESTHESIA EVENT (OUTPATIENT)
Dept: SURGERY | Facility: AMBULATORY SURGERY CENTER | Age: 52
End: 2017-11-13

## 2017-11-13 ENCOUNTER — INFUSION THERAPY VISIT (OUTPATIENT)
Dept: INFUSION THERAPY | Facility: CLINIC | Age: 52
End: 2017-11-13
Attending: PHYSICIAN ASSISTANT
Payer: MEDICARE

## 2017-11-13 VITALS
RESPIRATION RATE: 16 BRPM | OXYGEN SATURATION: 99 % | SYSTOLIC BLOOD PRESSURE: 125 MMHG | HEART RATE: 70 BPM | TEMPERATURE: 98.2 F | DIASTOLIC BLOOD PRESSURE: 62 MMHG

## 2017-11-13 DIAGNOSIS — K74.69 OTHER CIRRHOSIS OF LIVER (H): ICD-10-CM

## 2017-11-13 DIAGNOSIS — K11.3 PAROTID ABSCESS: Primary | ICD-10-CM

## 2017-11-13 DIAGNOSIS — Z00.00 ROUTINE GENERAL MEDICAL EXAMINATION AT A HEALTH CARE FACILITY: Primary | ICD-10-CM

## 2017-11-13 DIAGNOSIS — E11.3293 TYPE 2 DIABETES MELLITUS WITH MILD NONPROLIFERATIVE RETINOPATHY OF BOTH EYES WITHOUT MACULAR EDEMA, UNSPECIFIED LONG TERM INSULIN USE STATUS: ICD-10-CM

## 2017-11-13 PROCEDURE — 96365 THER/PROPH/DIAG IV INF INIT: CPT

## 2017-11-13 PROCEDURE — 25000128 H RX IP 250 OP 636: Mod: ZF | Performed by: PHYSICIAN ASSISTANT

## 2017-11-13 RX ORDER — ERTAPENEM 1 G/1
1 INJECTION, POWDER, LYOPHILIZED, FOR SOLUTION INTRAMUSCULAR; INTRAVENOUS EVERY 24 HOURS
Status: CANCELLED | OUTPATIENT
Start: 2017-11-14

## 2017-11-13 RX ADMIN — SODIUM CHLORIDE 1 G: 9 INJECTION, SOLUTION INTRAVENOUS at 16:17

## 2017-11-13 ASSESSMENT — PAIN SCALES - GENERAL: PAINLEVEL: MODERATE PAIN (5)

## 2017-11-13 NOTE — PROGRESS NOTES
Infusion Nursing Note:  Frandy Workman presents today to [facility] for a invanz infusion.    Frequency: daily x 14 doses  This is 7 of 14    Progress note:  ID verified by name and .  Assessment completed. Vitals were stable throughout time in [facility]. Patient education regarding infusion and possible side effects provided.  Patient verbalized understanding. yes    Premedications: were not ordered.    Infusion Rates: Infusion given over approximately 30 minutes.     Labs were not ordered for this appointment.    Vascular access: PICC, positive blood return noted.  Following infusion PICC flushed with 10ml's of normal saline.      Patient tolerated infusion well.    Discharge Plan:   Follow up plan of care with: Ongoing infusions at [facility]  Discharge instructions were reviewed with patient.  Patient/representative verbalized understanding of discharge instructions and all questions answered.    Patient discharged from [facility] in stable condition.    Elyssa Zheng RN    Administrations This Visit     ertapenem (INVanz) 1 g in NaCl 0.9 % 50 mL intermittent infusion     Admin Date Action Dose Rate Route Administered By          2017 New Bag 1 g 130 mL/hr Intravenous Elyssa Zheng RN                         /43  Pulse 75  Temp 98.2  F (36.8  C) (Oral)  SpO2 99%

## 2017-11-13 NOTE — MR AVS SNAPSHOT
After Visit Summary   11/13/2017    Frandy Workman    MRN: [id number]           Patient Information     Date Of Birth          1965        Visit Information        Provider Department      11/13/2017 4:00 PM UC 48 ATC; UC SPEC INFUSION [facility]        Today's Diagnoses     Parotid abscess    -  1       Follow-ups after your visit        Your next 10 appointments already scheduled     Nov 14, 2017  4:00 PM CST   Infusion 60 with UC SPEC INFUSION, UC 49 ATC   [facility] ([facility] [place])    [address] [place] [address]  2nd [facility] [address] [phone]            Nov 15, 2017  4:00 PM CST   Infusion 60 with UC SPEC INFUSION, UC 49 ATC   [facility] ([facility])    [address] [place] [id number] [phone]            Nov 16, 2017   Procedure with Milana Malave MD   [facility] ([facility])    [address] [place] [address]  5th [facility] [address] [phone]           Located in the Clinics and Surgery Center at [address] [place] [address].   parking is very convenient and highly recommended.  is a $6 flat rate fee.  Both  and self parkers should enter the main arrival plaza from [facility]; parking attendants will direct you based on your parking preference.            Nov 16, 2017  4:00 PM CST   Infusion 60 with UC SPEC INFUSION, UC 45 ATC   [facility] ([facility])    [address] [place] [address]  2nd [facility] [id number] [phone]            Nov 17, 2017   Procedure with Santi Dotson MD   [facility] [place] Endoscopy ([facility] [place])    [address]  [facility] [address]   [phone]           The [place] campus is located on the corner of [facility] [address] [facility] on the [address]. It is easily accessible from virtually any point in the [facility] area, via [address] and [address].            Nov 17, 2017 12:00 PM CST   (Arrive by 11:45 AM)   Return Visit with Sergio Noel MD   [facility] and Infectious Diseases ([facility])    [address] [place] [address]  3rd [facility] [id number] [phone]            Nov 17, 2017  4:00 PM CST   Infusion 60 with UC SPEC INFUSION   [facility] Specialty and Procedure ([facility])    [address] [place] [id number] [phone]              Future tests that were ordered for you today     Open Future Orders        Priority Expected Expires Ordered    Comprehensive metabolic panel Routine  11/13/2018 11/13/2017    Lipid panel reflex to direct LDL Fasting Routine  11/13/2018 11/13/2017    Albumin Random Urine Quantitative with Creat Ratio Routine  11/13/2018 11/13/2017    CBC with platelets Routine 11/13/2017 11/13/2018 11/13/2017            Who to contact     If you have questions or need follow up information about today's clinic visit or your schedule please contact [facility] SPECIALTY AND PROCEDURE directly at [phone].  Normal or non-critical lab and imaging results will be communicated to you by MyChart, letter or phone within 4 business days after the clinic has received the results. If you do not hear from us within 7 days, please contact the clinic through MyChart or phone. If you have a critical or abnormal lab result, we will notify you by phone as soon as possible.  Submit refill requests through [employer] or call your pharmacy and they will forward the refill request to us. Please allow 3 business days for your refill to  be completed.          Additional Information About Your Visit        [employer] Information     [employer] gives you secure access to your electronic health record. If you see a primary care provider, you can also send messages to your care team and make appointments. If you have questions, please call your primary care clinic.  If you do not have a primary care provider, please call [phone] and they will assist you.        Care EveryWhere ID     This is your Care EveryWhere ID. This could be used by other organizations to access your [facility] medical records  [id number]        Your Vitals Were     Pulse Temperature Respirations Pulse Oximetry          70 98.2  F (36.8  C) (Oral) 16 99%         Blood Pressure from Last 3 Encounters:   11/13/17 125/62   11/12/17 115/52   11/11/17 (P) 112/58    Weight from Last 3 Encounters:   11/10/17 85.7 kg (189 lb)   11/02/17 86.2 kg (190 lb 0.6 oz)   10/18/17 86.2 kg (190 lb)              Today, you had the following     No orders found for display         Today's Medication Changes          These changes are accurate as of: 11/13/17  5:14 PM.  If you have any questions, ask your nurse or doctor.               These medicines have changed or have updated prescriptions.        Dose/Directions    dapagliflozin 10 MG Tabs tablet   Commonly known as:  FARXIGA   This may have changed:  when to take this   Used for:  Type 2 diabetes mellitus with hyperglycemia, with long-term current use of insulin (H)        Dose:  10 mg   Take 1 tablet (10 mg) by mouth daily   Quantity:  90 tablet   Refills:  3       insulin degludec 200 UNIT/ML pen   Commonly known as:  TRESIBA   This may have changed:    - how much to take  - how to take this  - when to take this  - additional instructions   Used for:  Type 2 diabetes mellitus with hyperglycemia, with long-term current use of insulin (H)        Take 120 units daily.   Quantity:  36 mL   Refills:  3       lactulose 10 GM/15ML solution   Commonly  known as:  CHRONULAC   This may have changed:    - how much to take  - additional instructions   Used for:  Liver cirrhosis secondary to ESTRADA (H)        Dose:  30 g   Take 45 mLs (30 g) by mouth 4 times daily   Quantity:  7200 mL   Refills:  11       omeprazole 40 MG capsule   Commonly known as:  priLOSEC   This may have changed:  when to take this   Used for:  Gastroesophageal reflux disease, esophagitis presence not specified        Dose:  40 mg   Take 1 capsule (40 mg) by mouth daily   Quantity:  90 capsule   Refills:  2       potassium chloride SA 20 MEQ CR tablet   Commonly known as:  K-DUR/KLOR-CON M   This may have changed:  when to take this   Used for:  Hypokalemia        Dose:  20 mEq   Take 1 tablet (20 mEq) by mouth daily   Quantity:  90 tablet   Refills:  3       pravastatin 10 MG tablet   Commonly known as:  PRAVACHOL   This may have changed:    - how much to take  - when to take this   Used for:  Hyperlipidemia LDL goal <100        Dose:  20 mg   Take 2 tablets (20 mg) by mouth daily   Quantity:  90 tablet   Refills:  3       spironolactone 25 MG tablet   Commonly known as:  ALDACTONE   This may have changed:  when to take this   Used for:  Other ascites        Dose:  25 mg   Take 1 tablet (25 mg) by mouth daily   Quantity:  90 tablet   Refills:  1                Primary Care Provider Office Phone # Fax #    Natalie Mitzi Russell [phone]       [address] [place] [address] [address] [place] [id number]        Equal Access to Services     MINDI RODRIGUEZ AH: Hadii curly patel Somichael, waaxda luqadaha, qaybta kaalmada aderadhada, emy mcdonald . So [facility] [phone].    ATENCIÓN: Si habla español, tiene a hanley disposición servicios gratuitos de asistencia lingüística. Llame al [phone].    We comply with applicable federal civil rights laws and [place] laws. We do not discriminate on the basis of race, color, national origin, age, disability, sex, sexual  orientation, or gender identity.            Thank you!     Thank you for choosing [facility] PROCEDURE  for your care. Our goal is always to provide you with excellent care. Hearing back from our patients is one way we can continue to improve our services. Please take a few minutes to complete the written survey that you may receive in the mail after your visit with us. Thank you!             Your Updated Medication List - Protect others around you: Learn how to safely use, store and throw away your medicines at www.disposemymeds.org.          This list is accurate as of: 11/13/17  5:14 PM.  Always use your most recent med list.                   Brand Name Dispense Instructions for use Diagnosis    artificial tears Oint ophthalmic ointment     5 g    Place 1 g into the right eye At Bedtime    Dry eyes       blood glucose monitoring lancets     408 each    Use to test blood sugar 4 times daily or as directed.  1 box = 102 lancets    Type II diabetes mellitus (H)       blood glucose monitoring test strip    ACCU-CHEK EDINSON PLUS    400 each    Use to test blood sugar 4 times daily    Type II diabetes mellitus (H)       carvedilol 12.5 MG tablet    COREG    180 tablet    Take 1 tablet (12.5 mg) by mouth 2 times daily (with meals)    Liver cirrhosis secondary to ESTRADA (H), Secondary esophageal varices without bleeding (H)       dapagliflozin 10 MG Tabs tablet    FARXIGA    90 tablet    Take 1 tablet (10 mg) by mouth daily    Type 2 diabetes mellitus with hyperglycemia, with long-term current use of insulin (H)       ertapenem 1 GM vial    INVanz    10 each    Inject 1 g into the vein every 24 hours for 14 days    Parotid abscess       hypromellose-dextran Soln ophthalmic solution     1 Bottle    Place 1 drop into the right eye every hour as needed for dry eyes Apply at least 4 times daily and as needed for dry eye    Dry eyes       insulin aspart 100 UNIT/ML injection    NovoLOG  FLEXPEN    24 mL    1 unit per 4 Gms CHO at meals and snacks + correction scale of 1 unit per 25 mg/dL over 125. Average daily dose is 75 units.    Type II diabetes mellitus (H)       insulin degludec 200 UNIT/ML pen    TRESIBA    36 mL    Take 120 units daily.    Type 2 diabetes mellitus with hyperglycemia, with long-term current use of insulin (H)       insulin pen needle 32G X 4 MM    BD VIKTORIA U/F    100 each    Use as directed.    Type II diabetes mellitus (H)       lactobacillus rhamnosus (GG) capsule     28 capsule    Take 1 capsule by mouth 2 times daily for 14 days    Long term (current) use of antibiotics       lactulose 10 GM/15ML solution    CHRONULAC    7200 mL    Take 45 mLs (30 g) by mouth 4 times daily    Liver cirrhosis secondary to ESTRADA (H)       ofloxacin 0.3 % otic solution    FLOXIN    5 mL    Place 5 drops into the right ear 2 times daily for 10 days    Right ear pain       OLANZapine 2.5 MG tablet    zyPREXA    30 tablet    Take 1 tablet (2.5 mg) by mouth At Bedtime    Insomnia, unspecified type       omeprazole 40 MG capsule    priLOSEC    90 capsule    Take 1 capsule (40 mg) by mouth daily    Gastroesophageal reflux disease, esophagitis presence not specified       oxyCODONE IR 5 MG tablet    ROXICODONE    18 tablet    Take 1 tablet (5 mg) by mouth every 3 hours as needed for moderate to severe pain    Acute post-operative pain       potassium chloride SA 20 MEQ CR tablet    K-DUR/KLOR-CON M    90 tablet    Take 1 tablet (20 mEq) by mouth daily    Hypokalemia       pravastatin 10 MG tablet    PRAVACHOL    90 tablet    Take 2 tablets (20 mg) by mouth daily    Hyperlipidemia LDL goal <100       RA VITAMIN B-12 TR 1000 MCG Tbcr   Generic drug:  cyanocobalamin      Take 1,000 mcg by mouth every morning        rifaximin 550 MG Tabs tablet    XIFAXAN    60 tablet    Take 1 tablet (550 mg) by mouth 2 times daily    Hepatic encephalopathy (H)       spironolactone 25 MG tablet    ALDACTONE    90 tablet     Take 1 tablet (25 mg) by mouth daily    Other ascites       THERAVITE PO      Take 1 tablet by mouth every morning        VITAMIN D-3 PO      Take 2,000 Units by mouth every morning

## 2017-11-14 ENCOUNTER — INFUSION THERAPY VISIT (OUTPATIENT)
Dept: INFUSION THERAPY | Facility: CLINIC | Age: 52
End: 2017-11-14
Attending: PHYSICIAN ASSISTANT
Payer: MEDICARE

## 2017-11-14 VITALS
DIASTOLIC BLOOD PRESSURE: 48 MMHG | TEMPERATURE: 97.6 F | HEART RATE: 78 BPM | RESPIRATION RATE: 16 BRPM | SYSTOLIC BLOOD PRESSURE: 123 MMHG

## 2017-11-14 DIAGNOSIS — K11.3 PAROTID ABSCESS: Primary | ICD-10-CM

## 2017-11-14 PROCEDURE — 96365 THER/PROPH/DIAG IV INF INIT: CPT

## 2017-11-14 PROCEDURE — 25000128 H RX IP 250 OP 636: Mod: ZF | Performed by: PHYSICIAN ASSISTANT

## 2017-11-14 RX ORDER — ERTAPENEM 1 G/1
1 INJECTION, POWDER, LYOPHILIZED, FOR SOLUTION INTRAMUSCULAR; INTRAVENOUS EVERY 24 HOURS
Status: CANCELLED | OUTPATIENT
Start: 2017-11-15

## 2017-11-14 RX ADMIN — SODIUM CHLORIDE 1 G: 9 INJECTION, SOLUTION INTRAVENOUS at 15:56

## 2017-11-14 ASSESSMENT — PAIN SCALES - GENERAL: PAINLEVEL: MILD PAIN (2)

## 2017-11-14 NOTE — PATIENT INSTRUCTIONS
Ertapenem Sodium Solution for injection  What is this medicine?  ERTAPENEM (er ta PEN em) is a carbapenem antibiotic. It is used to treat certain kinds of bacterial infections. It will not work for colds, flu, or other viral infections.  This medicine may be used for other purposes; ask your health care provider or pharmacist if you have questions.  What should I tell my health care provider before I take this medicine?  They need to know if you have any of these conditions:    brain tumor, lesion    kidney disease    seizure disorder    an unusual or allergic reaction to ertapenem, other antibiotics, amide local anesthetics like lidocaine, or other medicines, foods, dyes, or preservatives    pregnant or trying to get pregnant    breast-feeding  How should I use this medicine?  This medicine is infused into a vein or injected deep into a muscle. It is usually given by a health care professional in a hospital or clinic setting.  If you get this medicine at home, you will be taught how to prepare and give this medicine. Use exactly as directed. Take your medicine at regular intervals. Do not take your medicine more often than directed.  It is important that you put your used needles and syringes in a special sharps container. Do not put them in a trash can. If you do not have a sharps container, call your pharmacist or healthcare provider to get one.  Talk to your pediatrician regarding the use of this medicine in children. While this drug may be prescribed for children as young as 3 months old for selected conditions, precautions do apply.  Overdosage: If you think you have taken too much of this medicine contact a poison control center or emergency room at once.  NOTE: This medicine is only for you. Do not share this medicine with others.  What if I miss a dose?  If you miss a dose, take it as soon as you can. If it is almost time for your next dose, take only that dose. Do not take double or extra doses.  What  may interact with this medicine?    birth control pills    probenecid  This list may not describe all possible interactions. Give your health care provider a list of all the medicines, herbs, non-prescription drugs, or dietary supplements you use. Also tell them if you smoke, drink alcohol, or use illegal drugs. Some items may interact with your medicine.  What should I watch for while using this medicine?  Tell your doctor or health care professional if your symptoms do not improve or if you get new symptoms. Your doctor will monitor your condition and blood work as needed.  Do not treat diarrhea with over the counter products. Contact your doctor if you have diarrhea that lasts more than 2 days or if it is severe and watery.  What side effects may I notice from receiving this medicine?  Side effects that you should report to your doctor or health care professional as soon as possible:    allergic reactions like skin rash, itching or hives, swelling of the face, lips, or tongue    anxiety, confusion, dizzy    chest pain    difficulty breathing, wheezing    edema, swelling    fever    irregular heart rate, blood pressure    pain or difficulty passing urine    seizures    unusually weak or tired    white or red patches in mouth  Side effects that usually do not require medical attention (report to your doctor or health care professional if they continue or are bothersome):    constipation or diarrhea    difficulty sleeping    headache    nausea, vomiting    pain, swelling or irritation where injected    stomach upset    vaginal itch, irritation  This list may not describe all possible side effects. Call your doctor for medical advice about side effects. You may report side effects to FDA at 7-655-FDA-2046.  Where should I keep my medicine?  Keep out of the reach of children.  You will be instructed on how to store this medicine. Throw away any unused medicine after the expiration date on the label.  NOTE:This sheet  is a summary. It may not cover all possible information. If you have questions about this medicine, talk to your doctor, pharmacist, or health care provider. Copyright  2016 Gold Standard

## 2017-11-14 NOTE — PROGRESS NOTES
Infusion Nursing Note  Frandy Workman [facility] for:   Chief Complaint   Patient presents with     Infusion     During [facility]'s [facility] appointment, orders from  Kera Dillon PA-C were completed.  Frequency: daily and today is dose 8 of 14 total.    Progress note:  Patient identification verified by name and date of birth.  Assessment completed.  Vitals recorded in Doc Flowsheets.  Patient was provided with education regarding infusion and possible side effects.  Patient verbalized understanding.     Premedications: were not ordered.  Infusion Rates: infusion given over approximately 30 mins.  Labs: were not ordered for this appointment.  Vascular access: PICC accessed today.  Treatment Conditions: non-applicable.  Patient tolerated infusion: well    Discharge Plan:   Follow up plan of care with: ongoing infusions [facility]. and primary medical doctor.  Discharge instructions were reviewed with patient.  Patient/representative verbalized understanding of discharge instructions and all questions answered.  [facility] in stable condition.    Becky Piper RN    Administrations This Visit     ertapenem (INVanz) 1 g in NaCl 0.9 % 50 mL intermittent infusion     Admin Date Action Dose Route Administered By             11/14/2017 New Bag 1 g Intravenous Becky Piper RN                          /48  Pulse 78  Temp 97.6  F (36.4  C) (Oral)  Resp 16

## 2017-11-14 NOTE — MR AVS SNAPSHOT
After Visit Summary   11/14/2017    Frandy Workman    MRN: [id number]           Patient Information     Date Of Birth          1965        Visit Information        Provider Department      11/14/2017 4:00 PM  49 ATC;  [facility]        Today's Diagnoses     Parotid abscess    -  1      Care Instructions      Ertapenem Sodium Solution for injection  What is this medicine?  ERTAPENEM (er ta PEN em) is a carbapenem antibiotic. It is used to treat certain kinds of bacterial infections. It will not work for colds, flu, or other viral infections.  This medicine may be used for other purposes; ask your health care provider or pharmacist if you have questions.  What should I tell my health care provider before I take this medicine?  They need to know if you have any of these conditions:    brain tumor, lesion    kidney disease    seizure disorder    an unusual or allergic reaction to ertapenem, other antibiotics, amide local anesthetics like lidocaine, or other medicines, foods, dyes, or preservatives    pregnant or trying to get pregnant    breast-feeding  How should I use this medicine?  This medicine is infused into a vein or injected deep into a muscle. It is usually given by a health care professional in a hospital or clinic setting.  If you get this medicine at home, you will be taught how to prepare and give this medicine. Use exactly as directed. Take your medicine at regular intervals. Do not take your medicine more often than directed.  It is important that you put your used needles and syringes in a special sharps container. Do not put them in a trash can. If you do not have a sharps container, call your pharmacist or healthcare provider to get one.  Talk to your pediatrician regarding the use of this medicine in children. While this drug may be prescribed for children as young as 3 months old for selected conditions,  precautions do apply.  Overdosage: If you think you have taken too much of this medicine contact a poison control center or emergency room at once.  NOTE: This medicine is only for you. Do not share this medicine with others.  What if I miss a dose?  If you miss a dose, take it as soon as you can. If it is almost time for your next dose, take only that dose. Do not take double or extra doses.  What may interact with this medicine?    birth control pills    probenecid  This list may not describe all possible interactions. Give your health care provider a list of all the medicines, herbs, non-prescription drugs, or dietary supplements you use. Also tell them if you smoke, drink alcohol, or use illegal drugs. Some items may interact with your medicine.  What should I watch for while using this medicine?  Tell your doctor or health care professional if your symptoms do not improve or if you get new symptoms. Your doctor will monitor your condition and blood work as needed.  Do not treat diarrhea with over the counter products. Contact your doctor if you have diarrhea that lasts more than 2 days or if it is severe and watery.  What side effects may I notice from receiving this medicine?  Side effects that you should report to your doctor or health care professional as soon as possible:    allergic reactions like skin rash, itching or hives, swelling of the face, lips, or tongue    anxiety, confusion, dizzy    chest pain    difficulty breathing, wheezing    edema, swelling    fever    irregular heart rate, blood pressure    pain or difficulty passing urine    seizures    unusually weak or tired    white or red patches in mouth  Side effects that usually do not require medical attention (report to your doctor or health care professional if they continue or are bothersome):    constipation or diarrhea    difficulty sleeping    headache    nausea, vomiting    pain, swelling or irritation where injected    stomach  upset    vaginal itch, irritation  This list may not describe all possible side effects. Call your doctor for medical advice about side effects. You may report side effects to FDA at 3-209-GOY-1786.  Where should I keep my medicine?  Keep out of the reach of children.  You will be instructed on how to store this medicine. Throw away any unused medicine after the expiration date on the label.  NOTE:This sheet is a summary. It may not cover all possible information. If you have questions about this medicine, talk to your doctor, pharmacist, or health care provider. Copyright  2016 Gold Standard                Follow-ups after your visit        Your next 10 appointments already scheduled     Nov 15, 2017  4:00 PM CST   Infusion 60 with UC SPEC INFUSION, UC 49 ATC   [facility] Procedure ([facility])    [address] [place] [address]  2nd [facility] [address] [phone]            Nov 16, 2017   Procedure with Milana Malave MD   [facility] ([facility])    [address] [place] [address]  5th [facility] [address] [phone]           Located in the Clinics and Surgery Center at [address] [place] [address].   parking is very convenient and highly recommended.  is a $6 flat rate fee.  Both  and self parkers should enter the main arrival plaza from [facility]; parking attendants will direct you based on your parking preference.            Nov 16, 2017  4:00 PM CST   Infusion 60 with UC SPEC INFUSION, UC 45 ATC   [facility] Procedure ([facility])    [address] [place] [address]  2nd [facility] [id number] [phone]            Nov 17, 2017   Procedure with Santi Dotson MD   [facility], Endoscopy ([facility])    [address] [facility]  [address]   [phone]           The [facility] campus is located on the corner of [facility] and [facility] on the [address]. It is easily accessible from virtually any point in the [facility] area, via [address].            Nov 17, 2017 12:00 PM CST   (Arrive by 11:45 AM)   Return Visit with Sergio Noel MD   [facility] and Infectious Diseases ([facility])    [address] [place] [address]  3rd Floor  [facility] [address] [phone]            Nov 17, 2017  4:00 PM CST   Infusion 60 with UC SPEC INFUSION, UC 49 ATC   [facility] Specialty and Procedure ([facility])    [address] [place] [address]  2nd [facility] [address] [phone]            Nov 18, 2017  2:00 PM CST   Infusion 60 with UC SPEC INFUSION   [facility] Special[facility] and Procedure ([facility])    [address] [place] [address]  2nd [facility] [id number] [phone]              Future tests that were ordered for you today     Open Future Orders        Priority Expected Expires Ordered    Comprehensive metabolic panel Routine  11/13/2018 11/13/2017    Lipid panel reflex to direct LDL Fasting Routine  11/13/2018 11/13/2017    Albumin Random Urine Quantitative with Creat Ratio Routine  11/13/2018 11/13/2017    CBC with platelets Routine 11/13/2017 11/13/2018 11/13/2017            Who to contact     If you have questions or need follow up information about today's clinic visit or your schedule please contact [facility] directly at [phone].  Normal or non-critical lab and imaging results will be communicated to you by MyChart, letter or phone within 4 business days after the clinic has received the results. If you do not hear from us within 7 days, please contact the clinic through MyChart or phone. If you have a  critical or abnormal lab result, we will notify you by phone as soon as possible.  Submit refill requests through [employer] or call your pharmacy and they will forward the refill request to us. Please allow 3 business days for your refill to be completed.          Additional Information About Your Visit        [employer] Information     [employer] gives you secure access to your electronic health record. If you see a primary care provider, you can also send messages to your care team and make appointments. If you have questions, please call your primary care clinic.  If you do not have a primary care provider, please call [phone] and they will assist you.        Care EveryWhere ID     This is your Care EveryWhere ID. This could be used by other organizations to access your [facility] medical records  [id number]        Your Vitals Were     Pulse Temperature Respirations             78 97.6  F (36.4  C) (Oral) 16          Blood Pressure from Last 3 Encounters:   11/14/17 123/48   11/13/17 125/62   11/12/17 115/52    Weight from Last 3 Encounters:   11/10/17 85.7 kg (189 lb)   11/02/17 86.2 kg (190 lb 0.6 oz)   10/18/17 86.2 kg (190 lb)              Today, you had the following     No orders found for display         Today's Medication Changes          These changes are accurate as of: 11/14/17  4:52 PM.  If you have any questions, ask your nurse or doctor.               These medicines have changed or have updated prescriptions.        Dose/Directions    dapagliflozin 10 MG Tabs tablet   Commonly known as:  FARXIGA   This may have changed:  when to take this   Used for:  Type 2 diabetes mellitus with hyperglycemia, with long-term current use of insulin (H)        Dose:  10 mg   Take 1 tablet (10 mg) by mouth daily   Quantity:  90 tablet   Refills:  3       insulin degludec 200 UNIT/ML pen   Commonly known as:  MILESSIBA   This may have changed:    - how much to take  - how to take this  - when to take this  - additional  instructions   Used for:  Type 2 diabetes mellitus with hyperglycemia, with long-term current use of insulin (H)        Take 120 units daily.   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 (H)        Dose:  30 g   Take 45 mLs (30 g) by mouth 4 times daily   Quantity:  7200 mL   Refills:  11       omeprazole 40 MG capsule   Commonly known as:  priLOSEC   This may have changed:  when to take this   Used for:  Gastroesophageal reflux disease, esophagitis presence not specified        Dose:  40 mg   Take 1 capsule (40 mg) by mouth daily   Quantity:  90 capsule   Refills:  2       potassium chloride SA 20 MEQ CR tablet   Commonly known as:  K-DUR/KLOR-CON M   This may have changed:  when to take this   Used for:  Hypokalemia        Dose:  20 mEq   Take 1 tablet (20 mEq) by mouth daily   Quantity:  90 tablet   Refills:  3       pravastatin 10 MG tablet   Commonly known as:  PRAVACHOL   This may have changed:    - how much to take  - when to take this   Used for:  Hyperlipidemia LDL goal <100        Dose:  20 mg   Take 2 tablets (20 mg) by mouth daily   Quantity:  90 tablet   Refills:  3       spironolactone 25 MG tablet   Commonly known as:  ALDACTONE   This may have changed:  when to take this   Used for:  Other ascites        Dose:  25 mg   Take 1 tablet (25 mg) by mouth daily   Quantity:  90 tablet   Refills:  1                Primary Care Provider Office Phone # Fax #    Natalie Cartagena Andrés Russell [phone]       [address] [place] [address] [address] [place] [id number]        Equal Access to Services     MAYCOL RODRIGUEZ : Amelia Vanessa, wacasper story, qajohn kaalemy herrera. So [facility] [phone].    ATENCIÓN: Si habla español, tiene a hanley disposición servicios gratuitos de  asistencia lingüística. Rl al [phone].    We comply with applicable federal civil rights laws and [place] laws. We do not discriminate on the basis of race, color, national origin, age, disability, sex, sexual orientation, or gender identity.            Thank you!     Thank you for choosing [facility]  for your care. Our goal is always to provide you with excellent care. Hearing back from our patients is one way we can continue to improve our services. Please take a few minutes to complete the written survey that you may receive in the mail after your visit with us. Thank you!             Your Updated Medication List - Protect others around you: Learn how to safely use, store and throw away your medicines at www.disposemymeds.org.          This list is accurate as of: 11/14/17  4:52 PM.  Always use your most recent med list.                   Brand Name Dispense Instructions for use Diagnosis    artificial tears Oint ophthalmic ointment     5 g    Place 1 g into the right eye At Bedtime    Dry eyes       blood glucose monitoring lancets     408 each    Use to test blood sugar 4 times daily or as directed.  1 box = 102 lancets    Type II diabetes mellitus (H)       blood glucose monitoring test strip    ACCU-CHEK EDINSON PLUS    400 each    Use to test blood sugar 4 times daily    Type II diabetes mellitus (H)       carvedilol 12.5 MG tablet    COREG    180 tablet    Take 1 tablet (12.5 mg) by mouth 2 times daily (with meals)    Liver cirrhosis secondary to ESTRADA (H), Secondary esophageal varices without bleeding (H)       dapagliflozin 10 MG Tabs tablet    FARXIGA    90 tablet    Take 1 tablet (10 mg) by mouth daily    Type 2 diabetes mellitus with hyperglycemia, with long-term current use of insulin (H)       ertapenem 1 GM vial    INVanz    10 each    Inject 1 g into the vein every 24 hours for 14 days    Parotid abscess       hypromellose-dextran Soln  ophthalmic solution     1 Bottle    Place 1 drop into the right eye every hour as needed for dry eyes Apply at least 4 times daily and as needed for dry eye    Dry eyes       insulin aspart 100 UNIT/ML injection    NovoLOG FLEXPEN    24 mL    1 unit per 4 Gms CHO at meals and snacks + correction scale of 1 unit per 25 mg/dL over 125. Average daily dose is 75 units.    Type II diabetes mellitus (H)       insulin degludec 200 UNIT/ML pen    TRESIBA    36 mL    Take 120 units daily.    Type 2 diabetes mellitus with hyperglycemia, with long-term current use of insulin (H)       insulin pen needle 32G X 4 MM    BD VIKTORIA U/F    100 each    Use as directed.    Type II diabetes mellitus (H)       lactobacillus rhamnosus (GG) capsule     28 capsule    Take 1 capsule by mouth 2 times daily for 14 days    Long term (current) use of antibiotics       lactulose 10 GM/15ML solution    CHRONULAC    7200 mL    Take 45 mLs (30 g) by mouth 4 times daily    Liver cirrhosis secondary to ESTRADA (H)       ofloxacin 0.3 % otic solution    FLOXIN    5 mL    Place 5 drops into the right ear 2 times daily for 10 days    Right ear pain       OLANZapine 2.5 MG tablet    zyPREXA    30 tablet    Take 1 tablet (2.5 mg) by mouth At Bedtime    Insomnia, unspecified type       omeprazole 40 MG capsule    priLOSEC    90 capsule    Take 1 capsule (40 mg) by mouth daily    Gastroesophageal reflux disease, esophagitis presence not specified       oxyCODONE IR 5 MG tablet    ROXICODONE    18 tablet    Take 1 tablet (5 mg) by mouth every 3 hours as needed for moderate to severe pain    Acute post-operative pain       potassium chloride SA 20 MEQ CR tablet    K-DUR/KLOR-CON M    90 tablet    Take 1 tablet (20 mEq) by mouth daily    Hypokalemia       pravastatin 10 MG tablet    PRAVACHOL    90 tablet    Take 2 tablets (20 mg) by mouth daily    Hyperlipidemia LDL goal <100       RA VITAMIN B-12 TR 1000 MCG Tbcr   Generic drug:  cyanocobalamin      Take 1,000  mcg by mouth every morning        rifaximin 550 MG Tabs tablet    XIFAXAN    60 tablet    Take 1 tablet (550 mg) by mouth 2 times daily    Hepatic encephalopathy (H)       spironolactone 25 MG tablet    ALDACTONE    90 tablet    Take 1 tablet (25 mg) by mouth daily    Other ascites       THERAVITE PO      Take 1 tablet by mouth every morning        VITAMIN D-3 PO      Take 2,000 Units by mouth every morning

## 2017-11-15 ENCOUNTER — INFUSION THERAPY VISIT (OUTPATIENT)
Dept: INFUSION THERAPY | Facility: CLINIC | Age: 52
End: 2017-11-15
Attending: PHYSICIAN ASSISTANT
Payer: MEDICARE

## 2017-11-15 VITALS — RESPIRATION RATE: 16 BRPM | DIASTOLIC BLOOD PRESSURE: 51 MMHG | SYSTOLIC BLOOD PRESSURE: 111 MMHG | TEMPERATURE: 97.8 F

## 2017-11-15 DIAGNOSIS — K11.3 PAROTID ABSCESS: Primary | ICD-10-CM

## 2017-11-15 PROCEDURE — 25000128 H RX IP 250 OP 636: Mod: ZF | Performed by: PHYSICIAN ASSISTANT

## 2017-11-15 PROCEDURE — 96365 THER/PROPH/DIAG IV INF INIT: CPT

## 2017-11-15 RX ORDER — ERTAPENEM 1 G/1
1 INJECTION, POWDER, LYOPHILIZED, FOR SOLUTION INTRAMUSCULAR; INTRAVENOUS EVERY 24 HOURS
Status: CANCELLED | OUTPATIENT
Start: 2017-11-16

## 2017-11-15 RX ADMIN — SODIUM CHLORIDE 1 G: 9 INJECTION, SOLUTION INTRAVENOUS at 16:07

## 2017-11-15 NOTE — PROGRESS NOTES
Infusion Nursing Note  Frandy Workman [facility] for:   No chief complaint on file.    During [facility]'s [facility] appointment, orders from  Kera Dillon PA-C were completed.  Frequency: daily and today is dose 7 of 14 total.    Progress note:  Patient identification verified by name and date of birth.  Assessment completed.  Vitals recorded in Doc Flowsheets.  Patient was provided with education regarding infusion and possible side effects.  Patient verbalized understanding.     Premedications: were not ordered.  Infusion Rates: infusion given over approximately 30 mins.  Labs: were not ordered for this appointment.  Vascular access: PICC accessed today.  Treatment Conditions: non-applicable.  Patient tolerated infusion: well    Discharge Plan:   Follow up plan of care with: ongoing [facility]usions [facility]. and primary medical doctor.  Discharge instructions were reviewed with patient.  Patient/representative verbalized understanding of discharge instructions and all questions answered.  [facility] discharged [facility] in stable condition.    Mary Beth Irvin RN         Administrations This Visit     ertapenem (INVanz) 1 g in NaCl 0.9 % 50 mL intermittent infusion     Admin Date Action Dose Rate Route Administered By          11/15/2017 New Bag 1 g 220 mL/hr Intravenous Mary Beth Irvin RN                         /51 (BP Location: Right arm)  Temp 97.8  F (36.6  C) (Oral)  Resp 16

## 2017-11-15 NOTE — MR AVS SNAPSHOT
After Visit Summary   11/15/2017    Frandy Workman    MRN: [id number]           Patient Information     Date Of Birth          1965        Visit Information        Provider Department      11/15/2017 4:00 PM UC 49 ATC; UC SPEC INFUSION [facility]        Today's Diagnoses     Parotid abscess    -  1       Follow-ups after your visit        Your next 10 appointments already scheduled     Nov 16, 2017   Procedure with Milana Malave MD   [facility] ([facility])    [address] [place] [address]  5th [facility] [id number] [phone]           Located in the Clinics and Surgery Center at [address] [place] [address].   parking is very convenient and highly recommended.  is a $6 flat rate fee.  Both  and self parkers should enter the main arrival plaza from [facility]; parking attendants will direct you based on your parking preference.            Nov 16, 2017  4:00 PM CST   Infusion 60 with UC SPEC INFUSION, UC 45 ATC   [facility] ([facility])    [address] [place] [address]  2nd [facility] [id number] [phone]            Nov 17, 2017   Procedure with Santi Dotson MD   [facility] [place], Endoscopy ([facility] [place] [facility], [facility])    [address] [id number] [phone]           [facility] located on the corner of [facility] and [facility] on the [facility]. It is easily accessible from virtually any point in the [facility] area, via [address].            Nov 17, 2017 12:00 PM CST   (Arrive by 11:45 AM)   Return Visit with Sergio Noel MD   [facility] ([facility])    [address] [place] [address]  3rd [facility]  [id number]   [phone]            Nov 17, 2017  4:00 PM CST   Infusion 60 with UC SPEC INFUSION, UC 49 ATC   [facility] ([facility])    [address] [place] [id number] [phone]            Nov 18, 2017  2:00 PM CST   Infusion 60 with UC SPEC INFUSION, UC 44 ATC   [facility] ([facility])    [address] [place] [id number] [phone]            Nov 19, 2017  2:00 PM CST   Infusion 60 with UC SPEC INFUSION   [facility] ([facility])    [address] [place] [id number] [phone]              Who to contact     If you have questions or need follow up information about today's clinic visit or your schedule please contact [facility] PROCED[facility] directly at [phone].  Normal or non-critical lab and imaging results will be communicated to you by Liztichart, letter or phone within 4 business days after the clinic has received the results. If you do not hear from us within 7 days, please contact the clinic through iMICROQt or phone. If you have a critical or abnormal lab result, we will notify you by phone as soon as possible.  Submit refill requests through 99designs or call your pharmacy and they will forward the refill request to us. Please allow 3 business days for your refill to be completed.          Additional Information About Your Visit        Liztichart Information     99designs gives you secure access to your electronic health record. If you see a primary care provider, you can also send messages to your care team and make appointments. If you have questions, please call your primary care clinic.  If you do not have a primary care provider, please call [phone] and they will  assist you.        Care EveryWhere ID     This is your Care EveryWhere ID. This could be used by other organizations to access your [facility] medical records  [id number]        Your Vitals Were     Temperature Respirations                97.8  F (36.6  C) (Oral) 16           Blood Pressure from Last 3 Encounters:   11/15/17 111/51   11/14/17 123/48   11/13/17 125/62    Weight from Last 3 Encounters:   11/10/17 85.7 kg (189 lb)   11/02/17 86.2 kg (190 lb 0.6 oz)   10/18/17 86.2 kg (190 lb)              Today, you had the following     No orders found for display         Today's Medication Changes          These changes are accurate as of: 11/15/17  4:48 PM.  If you have any questions, ask your nurse or doctor.               These medicines have changed or have updated prescriptions.        Dose/Directions    dapagliflozin 10 MG Tabs tablet   Commonly known as:  FARXIGA   This may have changed:  when to take this   Used for:  Type 2 diabetes mellitus with hyperglycemia, with long-term current use of insulin (H)        Dose:  10 mg   Take 1 tablet (10 mg) by mouth daily   Quantity:  90 tablet   Refills:  3       insulin degludec 200 UNIT/ML pen   Commonly known as:  TRESIBA   This may have changed:    - how much to take  - how to take this  - when to take this  - additional instructions   Used for:  Type 2 diabetes mellitus with hyperglycemia, with long-term current use of insulin (H)        Take 120 units daily.   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 (H)        Dose:  30 g   Take 45 mLs (30 g) by mouth 4 times daily   Quantity:  7200 mL   Refills:  11       omeprazole 40 MG capsule   Commonly known as:  priLOSEC   This may have changed:  when to take this   Used for:  Gastroesophageal reflux disease, esophagitis presence not specified         Dose:  40 mg   Take 1 capsule (40 mg) by mouth daily   Quantity:  90 capsule   Refills:  2       potassium chloride SA 20 MEQ CR tablet   Commonly known as:  K-DUR/KLOR-CON M   This may have changed:  when to take this   Used for:  Hypokalemia        Dose:  20 mEq   Take 1 tablet (20 mEq) by mouth daily   Quantity:  90 tablet   Refills:  3       pravastatin 10 MG tablet   Commonly known as:  PRAVACHOL   This may have changed:    - how much to take  - when to take this   Used for:  Hyperlipidemia LDL goal <100        Dose:  20 mg   Take 2 tablets (20 mg) by mouth daily   Quantity:  90 tablet   Refills:  3       spironolactone 25 MG tablet   Commonly known as:  ALDACTONE   This may have changed:  when to take this   Used for:  Other ascites        Dose:  25 mg   Take 1 tablet (25 mg) by mouth daily   Quantity:  90 tablet   Refills:  1                Primary Care Provider Office Phone # Fax #    Natalie Mitzi Russell [phone]       [address] [place] [address] [address] [place] [id number]        Equal Access to Services     [facility] : Hadii curly giraldo hadasho Soomaali, waaxda luqadaha, qaybta kaalmada adeegyada, emy mcdonald . So [facility] [phone].    ATENCIÓN: Si habla español, tiene a hanley disposición servicios gratuitos de asistencia lingüística. Ll[facility] [phone].    We comply with applicable federal civil rights laws and [place] laws. We do not discriminate on the basis of race, color, national origin, age, disability, sex, sexual orientation, or gender identity.            Thank you!     Thank you for choosing [facility]  for your care. Our goal is always to provide you with excellent care. Hearing back from our patients is one way we can continue to improve our services. Please take a few minutes to complete the written survey that you may receive in the mail after your visit with us. Thank you!             Your  Updated Medication List - Protect others around you: Learn how to safely use, store and throw away your medicines at www.disposemymeds.org.          This list is accurate as of: 11/15/17  4:48 PM.  Always use your most recent med list.                   Brand Name Dispense Instructions for use Diagnosis    artificial tears Oint ophthalmic ointment     5 g    Place 1 g into the right eye At Bedtime    Dry eyes       blood glucose monitoring lancets     408 each    Use to test blood sugar 4 times daily or as directed.  1 box = 102 lancets    Type II diabetes mellitus (H)       blood glucose monitoring test strip    ACCU-CHEK EDINSON PLUS    400 each    Use to test blood sugar 4 times daily    Type II diabetes mellitus (H)       carvedilol 12.5 MG tablet    COREG    180 tablet    Take 1 tablet (12.5 mg) by mouth 2 times daily (with meals)    Liver cirrhosis secondary to ESTRADA (H), Secondary esophageal varices without bleeding (H)       dapagliflozin 10 MG Tabs tablet    FARXIGA    90 tablet    Take 1 tablet (10 mg) by mouth daily    Type 2 diabetes mellitus with hyperglycemia, with long-term current use of insulin (H)       ertapenem 1 GM vial    INVanz    10 each    Inject 1 g into the vein every 24 hours for 14 days    Parotid abscess       hypromellose-dextran Soln ophthalmic solution     1 Bottle    Place 1 drop into the right eye every hour as needed for dry eyes Apply at least 4 times daily and as needed for dry eye    Dry eyes       insulin aspart 100 UNIT/ML injection    NovoLOG FLEXPEN    24 mL    1 unit per 4 Gms CHO at meals and snacks + correction scale of 1 unit per 25 mg/dL over 125. Average daily dose is 75 units.    Type II diabetes mellitus (H)       insulin degludec 200 UNIT/ML pen    TRESIBA    36 mL    Take 120 units daily.    Type 2 diabetes mellitus with hyperglycemia, with long-term current use of insulin (H)       insulin pen needle 32G X 4 MM    BD VIKTORIA U/F    100 each    Use as directed.    Type  II diabetes mellitus (H)       lactobacillus rhamnosus (GG) capsule     28 capsule    Take 1 capsule by mouth 2 times daily for 14 days    Long term (current) use of antibiotics       lactulose 10 GM/15ML solution    CHRONULAC    7200 mL    Take 45 mLs (30 g) by mouth 4 times daily    Liver cirrhosis secondary to ESTRADA (H)       ofloxacin 0.3 % otic solution    FLOXIN    5 mL    Place 5 drops into the right ear 2 times daily for 10 days    Right ear pain       OLANZapine 2.5 MG tablet    zyPREXA    30 tablet    Take 1 tablet (2.5 mg) by mouth At Bedtime    Insomnia, unspecified type       omeprazole 40 MG capsule    priLOSEC    90 capsule    Take 1 capsule (40 mg) by mouth daily    Gastroesophageal reflux disease, esophagitis presence not specified       oxyCODONE IR 5 MG tablet    ROXICODONE    18 tablet    Take 1 tablet (5 mg) by mouth every 3 hours as needed for moderate to severe pain    Acute post-operative pain       potassium chloride SA 20 MEQ CR tablet    K-DUR/KLOR-CON M    90 tablet    Take 1 tablet (20 mEq) by mouth daily    Hypokalemia       pravastatin 10 MG tablet    PRAVACHOL    90 tablet    Take 2 tablets (20 mg) by mouth daily    Hyperlipidemia LDL goal <100       RA VITAMIN B-12 TR 1000 MCG Tbcr   Generic drug:  cyanocobalamin      Take 1,000 mcg by mouth every morning        rifaximin 550 MG Tabs tablet    XIFAXAN    60 tablet    Take 1 tablet (550 mg) by mouth 2 times daily    Hepatic encephalopathy (H)       spironolactone 25 MG tablet    ALDACTONE    90 tablet    Take 1 tablet (25 mg) by mouth daily    Other ascites       THERAVITE PO      Take 1 tablet by mouth every morning        VITAMIN D-3 PO      Take 2,000 Units by mouth every morning

## 2017-11-16 ENCOUNTER — MEDICAL CORRESPONDENCE (OUTPATIENT)
Dept: HEALTH INFORMATION MANAGEMENT | Facility: CLINIC | Age: 52
End: 2017-11-16

## 2017-11-16 ENCOUNTER — ANESTHESIA (OUTPATIENT)
Dept: SURGERY | Facility: AMBULATORY SURGERY CENTER | Age: 52
End: 2017-11-16

## 2017-11-16 ENCOUNTER — HOSPITAL ENCOUNTER (OUTPATIENT)
Facility: AMBULATORY SURGERY CENTER | Age: 52
End: 2017-11-16
Attending: OTOLARYNGOLOGY

## 2017-11-16 ENCOUNTER — INFUSION THERAPY VISIT (OUTPATIENT)
Dept: INFUSION THERAPY | Facility: CLINIC | Age: 52
End: 2017-11-16
Attending: PHYSICIAN ASSISTANT
Payer: MEDICARE

## 2017-11-16 VITALS
DIASTOLIC BLOOD PRESSURE: 59 MMHG | RESPIRATION RATE: 16 BRPM | SYSTOLIC BLOOD PRESSURE: 125 MMHG | HEART RATE: 65 BPM | OXYGEN SATURATION: 100 % | TEMPERATURE: 98.3 F

## 2017-11-16 VITALS
HEIGHT: 69 IN | SYSTOLIC BLOOD PRESSURE: 120 MMHG | HEART RATE: 77 BPM | RESPIRATION RATE: 16 BRPM | WEIGHT: 188 LBS | TEMPERATURE: 98.3 F | DIASTOLIC BLOOD PRESSURE: 65 MMHG | OXYGEN SATURATION: 100 % | BODY MASS INDEX: 27.85 KG/M2

## 2017-11-16 DIAGNOSIS — G89.18 ACUTE POSTOPERATIVE PAIN: Primary | ICD-10-CM

## 2017-11-16 DIAGNOSIS — Z98.890 POST-OPERATIVE STATE: ICD-10-CM

## 2017-11-16 DIAGNOSIS — K11.3 PAROTID ABSCESS: Primary | ICD-10-CM

## 2017-11-16 LAB — GLUCOSE BLDC GLUCOMTR-MCNC: 97 MG/DL (ref 70–99)

## 2017-11-16 PROCEDURE — 27210995 ZZH RX 272: Mod: ZF | Performed by: PHYSICIAN ASSISTANT

## 2017-11-16 PROCEDURE — 96374 THER/PROPH/DIAG INJ IV PUSH: CPT

## 2017-11-16 PROCEDURE — 25000128 H RX IP 250 OP 636: Mod: ZF | Performed by: PHYSICIAN ASSISTANT

## 2017-11-16 RX ORDER — PROPOFOL 10 MG/ML
INJECTION, EMULSION INTRAVENOUS PRN
Status: DISCONTINUED | OUTPATIENT
Start: 2017-11-16 | End: 2017-11-16

## 2017-11-16 RX ORDER — LIDOCAINE 40 MG/G
CREAM TOPICAL
Status: DISCONTINUED | OUTPATIENT
Start: 2017-11-16 | End: 2017-11-17 | Stop reason: HOSPADM

## 2017-11-16 RX ORDER — DEXAMETHASONE SODIUM PHOSPHATE 10 MG/ML
10 INJECTION, SOLUTION INTRAMUSCULAR; INTRAVENOUS ONCE
Status: DISCONTINUED | OUTPATIENT
Start: 2017-11-16 | End: 2017-11-16 | Stop reason: HOSPADM

## 2017-11-16 RX ORDER — SODIUM CHLORIDE, SODIUM LACTATE, POTASSIUM CHLORIDE, CALCIUM CHLORIDE 600; 310; 30; 20 MG/100ML; MG/100ML; MG/100ML; MG/100ML
INJECTION, SOLUTION INTRAVENOUS CONTINUOUS
Status: DISCONTINUED | OUTPATIENT
Start: 2017-11-16 | End: 2017-11-16 | Stop reason: HOSPADM

## 2017-11-16 RX ORDER — LIDOCAINE 40 MG/G
CREAM TOPICAL
Status: DISCONTINUED | OUTPATIENT
Start: 2017-11-16 | End: 2017-11-16 | Stop reason: HOSPADM

## 2017-11-16 RX ORDER — ACETAMINOPHEN 325 MG/1
650 TABLET ORAL EVERY 4 HOURS PRN
Qty: 50 TABLET | Refills: 0 | Status: ON HOLD | OUTPATIENT
Start: 2017-11-16 | End: 2017-11-17

## 2017-11-16 RX ORDER — ERYTHROMYCIN 5 MG/G
1 OINTMENT OPHTHALMIC 3 TIMES DAILY
Qty: 3.5 G | Refills: 1 | Status: SHIPPED | OUTPATIENT
Start: 2017-11-16 | End: 2018-07-11

## 2017-11-16 RX ORDER — PROPOFOL 10 MG/ML
INJECTION, EMULSION INTRAVENOUS CONTINUOUS PRN
Status: DISCONTINUED | OUTPATIENT
Start: 2017-11-16 | End: 2017-11-16

## 2017-11-16 RX ORDER — OXYCODONE HYDROCHLORIDE 5 MG/1
5-10 TABLET ORAL
Qty: 15 TABLET | Refills: 0 | Status: SHIPPED | OUTPATIENT
Start: 2017-11-16 | End: 2018-01-29

## 2017-11-16 RX ORDER — LIDOCAINE HYDROCHLORIDE 20 MG/ML
INJECTION, SOLUTION INFILTRATION; PERINEURAL PRN
Status: DISCONTINUED | OUTPATIENT
Start: 2017-11-16 | End: 2017-11-16

## 2017-11-16 RX ADMIN — PROPOFOL 50 MCG/KG/MIN: 10 INJECTION, EMULSION INTRAVENOUS at 13:05

## 2017-11-16 RX ADMIN — WATER 1 G: 1 INJECTION INTRAMUSCULAR; INTRAVENOUS; SUBCUTANEOUS at 15:02

## 2017-11-16 RX ADMIN — PROPOFOL 50 MG: 10 INJECTION, EMULSION INTRAVENOUS at 12:20

## 2017-11-16 RX ADMIN — SODIUM CHLORIDE, SODIUM LACTATE, POTASSIUM CHLORIDE, CALCIUM CHLORIDE: 600; 310; 30; 20 INJECTION, SOLUTION INTRAVENOUS at 12:17

## 2017-11-16 RX ADMIN — PROPOFOL 30 MG: 10 INJECTION, EMULSION INTRAVENOUS at 13:02

## 2017-11-16 RX ADMIN — LIDOCAINE HYDROCHLORIDE 80 MG: 20 INJECTION, SOLUTION INFILTRATION; PERINEURAL at 12:20

## 2017-11-16 RX ADMIN — PROPOFOL 50 MG: 10 INJECTION, EMULSION INTRAVENOUS at 12:23

## 2017-11-16 RX ADMIN — PROPOFOL 40 MG: 10 INJECTION, EMULSION INTRAVENOUS at 13:06

## 2017-11-16 ASSESSMENT — LIFESTYLE VARIABLES: TOBACCO_USE: 1

## 2017-11-16 NOTE — DISCHARGE INSTRUCTIONS
[facility]  Home Care Following Anesthesia  For 24 hours after surgery:  1. Get plenty of rest.  A responsible adult must stay with you for at least 24 hours after you leave the surgery center.  2. Do not drive or use heavy equipment.  If you have weakness or tingling, don't drive or use heavy equipment until this feeling goes away.   3. Do not drink alcohol.   4. Avoid strenuous or risky activities.  Ask for help when climbing stairs.  5. You may feel lightheaded.  IF so, sit for a few minutes before standing.  Have someone help you get up.   6. If you have nausea (feel sick to your stomach): Drink only clear liquids such as apple juice, ginger ale, broth or 7-Up.  Rest may also help.  Be sure to drink enough fluids.  Move to a regular diet as you feel able.   7. You may have a slight fever.  Call the doctor if your fever is over 100 F (37.7 C) (taken under the tongue) or lasts longer than 24 hours.  8. You may have a dry mouth, a sore throat, muscle aches or trouble sleeping. These should go away after 24 hours.  9. Do not make important or legal decisions.      Tips for taking pain medications  To get the best pain relief possible, remember these points:    Take pain medications as directed, before pain becomes severe.    Pain medication can upset your stomach: taking it with food may help.    Constipation is a common side effect of pain medication. Drink plenty of  fluids.    Eat foods high in fiber. Take a stool softener if recommended by your doctor or pharmacist.    Do not drink alcohol, drive or operate machinery while taking pain medications.    Ask about other ways to control pain, such as with heat, ice or relaxation.    Tylenol/Acetaminophen Consumption  To help encourage the safe use of acetaminophen, the makers of TYLENOL  have lowered the maximum daily dose for single-ingredient Extra Strength TYLENOL  (acetaminophen) products sold in the [place] from 8 pills per day  (4,000 mg) to 6 pills per day (3,000 mg). The dosing interval has also changed from 2 pills every 4-6 hours to 2 pills every 6 hours.    If you feel your pain relief is insufficient, you may take Tylenol/Acetaminophen in addition to your narcotic pain medication.     Be careful not to exceed 3,000 mg of Tylenol/Acetaminophen in a 24 hour period from all sources.    If you are taking extra strength Tylenol/acetaminophen (500 mg), the maximum dose is 6 tablets in 24 hours.    If you are taking regular strength acetaminophen (325 mg), the maximum dose is 9 tablets in 24 hours.    Call a doctor for any of the followin. Signs of infection (fever, growing tenderness at the surgery site, a large amount of drainage or bleeding, severe pain, foul-smelling drainage, redness, swelling).  2. It has been over 8 to 10 hours since surgery and you are still not able to urinate (pass water).  3. Headache for over 24 hours.  Your doctor is:  Dr. Milana Malave, Otolaryngology: [phone]                    Or dial [phone] and ask for the resident on call for:  ENT Otolaryngology  For emergency care, call the:  [place] [facility]:  [phone] (TTY for hearing impaired: [phone])

## 2017-11-16 NOTE — BRIEF OP NOTE
[facility]    Brief Operative Note    Pre-operative diagnosis: Right Facial Paresis   Post-operative diagnosis * No post-op diagnosis entered *  Procedure: Procedure(s):  Right Upper Eyelid Weight, right tarsal strip lower eyelid - Wound Class: I-Clean  Surgeon: Surgeon(s) and Role:     * Milana Malave MD - Primary  Anesthesia: Combined MAC with Local   Estimated blood loss: Minimal  Drains: None  Specimens: * No specimens in log *  Findings:   Right dermatochalasis and ectropion. See operative report for full details.  Complications: None.  Implants: Platinum right upper eyelid weight.

## 2017-11-16 NOTE — ANESTHESIA CARE TRANSFER NOTE
Patient: Frandy Workman    Procedure(s):  Right Upper Eyelid Weight, right tarsal strip lower eyelid - Wound Class: I-Clean    Diagnosis: Right Facial Paresis   Diagnosis Additional Information: No value filed.    Anesthesia Type:   MAC     Note:  Airway :Room Air  Patient transferred to:Phase II  Comments: Patient awake and breathing spont. VSS. No complaints of pain or nausea. Report to RNHandoff Report: Identifed the Patient, Identified the Reponsible Provider, Reviewed the pertinent medical history, Discussed the surgical course, Reviewed Intra-OP anesthesia mangement and issues during anesthesia, Set expectations for post-procedure period and Allowed opportunity for questions and acknowledgement of understanding      Vitals: (Last set prior to Anesthesia Care Transfer)    CRNA VITALS  11/16/2017 1252 - 11/16/2017 1333      11/16/2017             Pulse: 73    Ht Rate: 73    SpO2: 96 %    Resp Rate (set): 10    EKG: NSR                Electronically Signed By: SHANIQUE Wren CRNA  November 16, 2017  1:33 PM

## 2017-11-16 NOTE — ANESTHESIA PREPROCEDURE EVALUATION
Anesthesia Evaluation     . Pt has had prior anesthetic. Type: MAC and General    No history of anesthetic complications          ROS/MED HX    ENT/Pulmonary:     (+)tobacco use, CIGAR USE / CURRENT CHEWING TOBACCO packs/day  , . .    Neurologic:     (+)neuropathy - feet,     Cardiovascular:     (+) Dyslipidemia, ----. Taking blood thinners Pt has not received instructions: . . . :. . Previous cardiac testing Echodate:results:Stress Testdate: results:ECG reviewed date: results: date: results:          METS/Exercise Tolerance:  >4 METS   Hematologic:     (+) Anemia, History of Transfusion no previous transfusion reaction Other Hematologic Disorder-thrombocytopenia      Musculoskeletal:   (+) arthritis, , , -       GI/Hepatic:     (+) GERD liver disease, Other GI/Hepatic ESTRADA Cirrhosis      Renal/Genitourinary:  - ROS Renal section negative       Endo:     (+) type II DM Last HgA1c: 7.6% date: 10/16/17 Using insulin - not using insulin pump Normal glucose range: 120-220 not previously admitted for DM/DKA Diabetic complications: neuropathy, .      Psychiatric:     (+) psychiatric history bipolar      Infectious Disease:  - neg infectious disease ROS       Malignancy:   (+) Malignancy History of Skin  Skin CA status post Surgery,         Other:    (+) No chance of pregnancy no H/O Chronic Pain,no other significant disability                  Procedure: Procedure(s):  Left Upper Eyelid Weight, Platinum 1.0 Versus 1.2 - Wound Class: I-Clean        PMHx/PSHx:  Past Medical History:   Diagnosis Date     Anemia 2013    Low blood plates current is 37     BPH (benign prostatic hyperplasia)      Cholelithiasis      Conductive hearing loss 8/16/2017    Have a lump on my right side of my face.  Had wax discharge     Depressive disorder 1986    Suffer effects throughout life     Gastroesophageal reflux disease 12/1/2014    Being treated with Prilosac     Hepatitis 2014    Diagnosed with schrosis ESTRADA in 2014.  Suffer from  hepatatie     Hyperlipidemia      Liver cirrhosis secondary to ESTRADA (H)      Thrombocytopenia (H)      Type II diabetes mellitus (H)        Past Surgical History:   Procedure Laterality Date     ESOPHAGOSCOPY, GASTROSCOPY, DUODENOSCOPY (EGD), COMBINED N/A 11/17/2016    Procedure: COMBINED ESOPHAGOSCOPY, GASTROSCOPY, DUODENOSCOPY (EGD);  Surgeon: Santi Rosas MD;  Location: [facility] GI     HEAD & NECK SURGERY       KNEE SURGERY Left      ORTHOPEDIC SURGERY       PAROTIDECTOMY, RADICAL NECK DISSECTION Right 11/2/2017    Procedure: PAROTIDECTOMY, RADICAL NECK DISSECTION;  Right Superfacial Parotidectomy , Facial nerve repair. with [facility] facial nerve monitor.;  Surgeon: Asiya Morgan MD;  Location: [facility] OR     PICC INSERTION Left 11/06/2017    4fr SL BioFlo PICC, 44cm in the L basilic vein w/ tip in the low SVC     VASCULAR SURGERY           Current Outpatient Prescriptions on File Prior to Encounter:  ofloxacin (FLOXIN) 0.3 % otic solution Place 5 drops into the right ear 2 times daily for 10 days   lactobacillus rhamnosus, GG, (CULTURELL) capsule Take 1 capsule by mouth 2 times daily for 14 days   ertapenem (INVANZ) 1 GM vial Inject 1 g into the vein every 24 hours for 14 days   artificial tears OINT ophthalmic ointment Place 1 g into the right eye At Bedtime   hypromellose-dextran (ARTIFICAL TEARS) SOLN ophthalmic solution Place 1 drop into the right eye every hour as needed for dry eyes Apply at least 4 times daily and as needed for dry eye   dapagliflozin (FARXIGA) 10 MG TABS tablet Take 1 tablet (10 mg) by mouth daily (Patient taking differently: Take 10 mg by mouth every morning )   pravastatin (PRAVACHOL) 10 MG tablet Take 2 tablets (20 mg) by mouth daily (Patient taking differently: Take 10 mg by mouth every morning )   rifaximin (XIFAXAN) 550 MG TABS tablet Take 1 tablet (550 mg) by mouth 2 times daily   lactulose (CHRONULAC) 10 GM/15ML solution Take 45 mLs (30 g) by mouth 4 times daily (Patient taking  differently: Take 60 g by mouth 4 times daily 2-6 TIMES A DAY)   insulin degludec (TRESIBA) 200 UNIT/ML pen Take 120 units daily. (Patient taking differently: Inject 110 Units Subcutaneous every morning Take 110 units dailY AS OF 10/19/17)   spironolactone (ALDACTONE) 25 MG tablet Take 1 tablet (25 mg) by mouth daily (Patient taking differently: Take 25 mg by mouth every morning )   carvedilol (COREG) 12.5 MG tablet Take 1 tablet (12.5 mg) by mouth 2 times daily (with meals)   omeprazole (PRILOSEC) 40 MG capsule Take 1 capsule (40 mg) by mouth daily (Patient taking differently: Take 40 mg by mouth every evening )   OLANZapine (ZYPREXA) 2.5 MG tablet Take 1 tablet (2.5 mg) by mouth At Bedtime   potassium chloride SA (K-DUR/KLOR-CON M) 20 MEQ CR tablet Take 1 tablet (20 mEq) by mouth daily (Patient taking differently: Take 20 mEq by mouth every morning )   Cholecalciferol (VITAMIN D-3 PO) Take 2,000 Units by mouth every morning    insulin aspart (NOVOLOG FLEXPEN) 100 UNIT/ML injection 1 unit per 4 Gms CHO at meals and snacks + correction scale of 1 unit per 25 mg/dL over 125. Average daily dose is 75 units.   cyanocobalamin (RA VITAMIN B-12 TR) 1000 MCG TBCR Take 1,000 mcg by mouth every morning    Multiple Vitamin (THERAVITE PO) Take 1 tablet by mouth every morning    oxyCODONE IR (ROXICODONE) 5 MG tablet Take 1 tablet (5 mg) by mouth every 3 hours as needed for moderate to severe pain   blood glucose monitoring (ACCU-CHEK EDINSON PLUS) test strip Use to test blood sugar 4 times daily   blood glucose monitoring (ACCU-CHEK FASTCLIX) lancets Use to test blood sugar 4 times daily or as directed.  1 box = 102 lancets   insulin pen needle (BD VIKTORIA U/F) 32G X 4 MM Use as directed.     No current facility-administered medications on file prior to encounter.     Social Hx:   Social History   Substance Use Topics     Smoking status: Former Smoker     Packs/day: 6.00     Years: 30.00     Types: Cigars     Start date: 5/1/2016      Quit date: 5/15/2013     Smokeless tobacco: Former User     Types: Chew     Quit date: 10/31/2017      Comment: 1 tin per week     Alcohol use No      Comment: quit Sept. 1996       Allergies:   Allergies   Allergen Reactions     Codeine Other (See Comments)     Cannot take due to liver  Cannot tolerate oral narcotics         NPO Status: Per ASA Guidelines    Labs:    Blood Bank:  Lab Results   Component Value Date    ABO O 11/02/2017    RH Pos 11/02/2017    AS Neg 11/02/2017     BMP:  Recent Labs   Lab Test  11/06/17   1050   NA  139   POTASSIUM  5.3   CHLORIDE  110*   CO2  23   BUN  23   CR  0.93   GLC  365*   DEBORAH  8.9     CBC:   Recent Labs   Lab Test  11/06/17   1050   WBC  3.0*   RBC  2.77*   HGB  9.6*   HCT  29.2*   MCV  105*   MCH  34.7*   MCHC  32.9   RDW  14.0   PLT  42*     Coags:  Recent Labs   Lab Test  11/02/17   0621   INR  1.18*         Physical Exam  Normal systems: pulmonary    Airway   Mallampati: II  TM distance: >3 FB  Neck ROM: full    Dental     Cardiovascular   Rhythm and rate: regular and normal      Pulmonary                     Anesthesia Plan      History & Physical Review  History and physical reviewed and following examination; no interval change.    ASA Status:  3 .    NPO Status:  > 6 hours    Plan for MAC with Intravenous and Propofol induction. Maintenance will be TIVA.  Reason for MAC:  Difficulty with conscious sedation (QS)  PONV prophylaxis:  Ondansetron (or other 5HT-3)       Postoperative Care  Postoperative pain management:  IV analgesics.      Consents  Anesthetic plan, risks, benefits and alternatives discussed with:  Patient..                  History and physical assessed; Patient examined.   Risks and alternatives presented and discussed. Patient and family agree. All questions answered.      Patrice Palencia MD  Staff Anesthesiologist  [phone]

## 2017-11-16 NOTE — ANESTHESIA POSTPROCEDURE EVALUATION
Patient: Frandy Workman    Procedure(s):  Right Upper Eyelid Weight, right tarsal strip lower eyelid - Wound Class: I-Clean    Diagnosis:Right Facial Paresis   Diagnosis Additional Information: No value filed.    Anesthesia Type:  MAC    Note:  Anesthesia Post Evaluation    Patient location during evaluation: Phase 2  Patient participation: Able to fully participate in evaluation  Level of consciousness: awake and alert  Pain management: adequate  Airway patency: patent  Cardiovascular status: acceptable  Respiratory status: acceptable  Hydration status: acceptable  PONV: none     Anesthetic complications: None          Last vitals:  Vitals:    11/16/17 1024 11/16/17 1326 11/16/17 1330   BP: 115/62 111/59    Pulse: 70 69 77   Resp: 16 16 16   Temp: 36.8  C (98.3  F) 36.9  C (98.4  F)    SpO2: 98% 97% 99%         Electronically Signed By: Patrice Palencia MD  November 16, 2017  1:52 PM

## 2017-11-16 NOTE — MR AVS SNAPSHOT
After Visit Summary   11/16/2017    Frandy Workman    MRN: [id number]           Patient Information     Date Of Birth          1965        Visit Information        Provider Department      11/16/2017 4:00 PM UC 50 ATC; UC SPEC INFUSION [facility] Specialty and Procedure        Today's Diagnoses     Parotid abscess    -  1       Follow-ups after your visit        Your next 10 appointments already scheduled     Nov 16, 2017  4:00 PM CST   Infusion 60 with UC SPEC INFUSION, UC 50 ATC   [facility] Specialty and Procedure ([facility] [place])    [address]  2nd Floor  [facility] [address] [phone]            Nov 17, 2017   Procedure with Santi Dotson MD   [facility] [place], Endoscopy ([facility])    [address] [address] [phone]           The [facility] is located on the corner of [address] and [address] on the [facility]. It is easily accessible from virtually any point in the [place] area, via [address] and [address].            Nov 17, 2017 12:00 PM CST   (Arrive by 11:45 AM)   Return Visit with Sergio Noel MD   [facility] and Infectious Diseases ([facility])    [address]  3rd Floor  [facility] [address] [phone]            Nov 17, 2017  4:00 PM CST   Infusion 60 with UC SPEC INFUSION, UC 49 ATC   [facility] Specialty and Procedure ([facility] [place])    [address]  [address] [facility] [place] [address] [phone]            Nov 18, 2017  2:00 PM CST   Infusion 60 with UC SPEC INFUSION, UC 44 ATC   [facility] Specialty and Procedure ([facility] [place])    [address]  [address] [facility] [place] [address] [phone]            Nov  19, 2017  2:00 PM CST   Infusion 60 with UC SPEC INFUSION, UC 50 ATC   [facility] Specialty and Procedure ([facility])    [address]  2nd [address]  [facility] [address] [phone]              Who to contact     If you have questions or need follow up information about today's clinic visit or your schedule please contact [facility] SPECIALTY AND PROCEDURE directly at [phone].  Normal or non-critical lab and imaging results will be communicated to you by Tiger Pistolhart, letter or phone within 4 business days after the clinic has received the results. If you do not hear from us within 7 days, please contact the clinic through SensingStript or phone. If you have a critical or abnormal lab result, we will notify you by phone as soon as possible.  Submit refill requests through VANCL or call your pharmacy and they will forward the refill request to us. Please allow 3 business days for your refill to be completed.          Additional Information About Your Visit        VANCL Information     VANCL gives you secure access to your electronic health record. If you see a primary care provider, you can also send messages to your care team and make appointments. If you have questions, please call your primary care clinic.  If you do not have a primary care provider, please call [phone] and they will assist you.        Care EveryWhere ID     This is your Care EveryWhere ID. This could be used by other organizations to access your [facility] medical records  [id number]        Your Vitals Were     Pulse Temperature Respirations Pulse Oximetry          65 98.3  F (36.8  C) (Oral) 16 100%         Blood Pressure from Last 3 Encounters:   11/16/17 125/59   11/16/17 120/65   11/15/17 111/51    Weight from Last 3 Encounters:   11/16/17 85.3 kg (188 lb)   11/10/17 85.7 kg (189 lb)   11/02/17 86.2 kg (190 lb 0.6 oz)              Today, you had the  following     No orders found for display         Today's Medication Changes          These changes are accurate as of: 11/16/17  3:25 PM.  If you have any questions, ask your nurse or doctor.               These medicines have changed or have updated prescriptions.        Dose/Directions    dapagliflozin 10 MG Tabs tablet   Commonly known as:  FARXIGA   This may have changed:  when to take this   Used for:  Type 2 diabetes mellitus with hyperglycemia, with long-term current use of insulin (H)        Dose:  10 mg   Take 1 tablet (10 mg) by mouth daily   Quantity:  90 tablet   Refills:  3       insulin degludec 200 UNIT/ML pen   Commonly known as:  TRESIBA   This may have changed:    - how much to take  - how to take this  - when to take this  - additional instructions   Used for:  Type 2 diabetes mellitus with hyperglycemia, with long-term current use of insulin (H)        Take 120 units daily.   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 (H)        Dose:  30 g   Take 45 mLs (30 g) by mouth 4 times daily   Quantity:  7200 mL   Refills:  11       omeprazole 40 MG capsule   Commonly known as:  priLOSEC   This may have changed:  when to take this   Used for:  Gastroesophageal reflux disease, esophagitis presence not specified        Dose:  40 mg   Take 1 capsule (40 mg) by mouth daily   Quantity:  90 capsule   Refills:  2       potassium chloride SA 20 MEQ CR tablet   Commonly known as:  K-DUR/KLOR-CON M   This may have changed:  when to take this   Used for:  Hypokalemia        Dose:  20 mEq   Take 1 tablet (20 mEq) by mouth daily   Quantity:  90 tablet   Refills:  3       pravastatin 10 MG tablet   Commonly known as:  PRAVACHOL   This may have changed:    - how much to take  - when to take this   Used for:  Hyperlipidemia LDL goal <100         Dose:  20 mg   Take 2 tablets (20 mg) by mouth daily   Quantity:  90 tablet   Refills:  3       spironolactone 25 MG tablet   Commonly known as:  ALDACTONE   This may have changed:  when to take this   Used for:  Other ascites        Dose:  25 mg   Take 1 tablet (25 mg) by mouth daily   Quantity:  90 tablet   Refills:  1                Primary Care Provider Office Phone # Fax #    Natalie Mitzi Andrés Russell [phone]       [address] [place] [address] [address] [place] [address]        Equal Access to Services     [facility]COL [facility] : Hadii curly giraldo hadasho Somichael, waaxda luqadaha, qaybta kaalmada adeegyaluis, emy mcdonald . So [facility] [phone].    ATENCIÓN: Si habla español, tiene a hanley disposición servicios gratuitos de asistencia lingüística. [facility] [phone].    We comply with applicable federal civil rights laws and [place] laws. We do not discriminate on the basis of race, color, national origin, age, disability, sex, sexual orientation, or gender identity.            Thank you!     Thank you for choosing [facility] SPECIALTY AND PROCEDURE  for your care. Our goal is always to provide you with excellent care. Hearing back from our patients is one way we can continue to improve our services. Please take a few minutes to complete the written survey that you may receive in the mail after your visit with us. Thank you!             Your Updated Medication List - Protect others around you: Learn how to safely use, store and throw away your medicines at www.disposemymeds.org.          This list is accurate as of: 11/16/17  3:25 PM.  Always use your most recent med list.                   Brand Name Dispense Instructions for use Diagnosis    acetaminophen 325 MG tablet    TYLENOL    50 tablet    Take 2 tablets (650 mg) by mouth every 4 hours as needed for mild pain    Acute postoperative pain       * artificial tears Oint ophthalmic ointment     5 g     Place 1 g into the right eye At Bedtime    Dry eyes       * ARTIFICIAL TEARS Oint     3.5 g    Apply 1 Application to eye At Bedtime    Post-operative state       blood glucose monitoring lancets     408 each    Use to test blood sugar 4 times daily or as directed.  1 box = 102 lancets    Type II diabetes mellitus (H)       blood glucose monitoring test strip    ACCU-CHEK EDINSON PLUS    400 each    Use to test blood sugar 4 times daily    Type II diabetes mellitus (H)       carvedilol 12.5 MG tablet    COREG    180 tablet    Take 1 tablet (12.5 mg) by mouth 2 times daily (with meals)    Liver cirrhosis secondary to ESTRADA (H), Secondary esophageal varices without bleeding (H)       dapagliflozin 10 MG Tabs tablet    FARXIGA    90 tablet    Take 1 tablet (10 mg) by mouth daily    Type 2 diabetes mellitus with hyperglycemia, with long-term current use of insulin (H)       ertapenem 1 GM vial    INVanz    10 each    Inject 1 g into the vein every 24 hours for 14 days    Parotid abscess       erythromycin ophthalmic ointment    ROMYCIN    3.5 g    Place 1 Application into the right eye 3 times daily    Post-operative state       hypromellose-dextran Soln ophthalmic solution     1 Bottle    Place 1 drop into the right eye every hour as needed for dry eyes Apply at least 4 times daily and as needed for dry eye    Dry eyes       insulin aspart 100 UNIT/ML injection    NovoLOG FLEXPEN    24 mL    1 unit per 4 Gms CHO at meals and snacks + correction scale of 1 unit per 25 mg/dL over 125. Average daily dose is 75 units.    Type II diabetes mellitus (H)       insulin degludec 200 UNIT/ML pen    TRESIBA    36 mL    Take 120 units daily.    Type 2 diabetes mellitus with hyperglycemia, with long-term current use of insulin (H)       insulin pen needle 32G X 4 MM    BD VIKTORIA U/F    100 each    Use as directed.    Type II diabetes mellitus (H)       lactobacillus rhamnosus (GG) capsule     28 capsule    Take 1 capsule by mouth 2 times  daily for 14 days    Long term (current) use of antibiotics       lactulose 10 GM/15ML solution    CHRONULAC    7200 mL    Take 45 mLs (30 g) by mouth 4 times daily    Liver cirrhosis secondary to ESTRADA (H)       ofloxacin 0.3 % otic solution    FLOXIN    5 mL    Place 5 drops into the right ear 2 times daily for 10 days    Right ear pain       OLANZapine 2.5 MG tablet    zyPREXA    30 tablet    Take 1 tablet (2.5 mg) by mouth At Bedtime    Insomnia, unspecified type       omeprazole 40 MG capsule    priLOSEC    90 capsule    Take 1 capsule (40 mg) by mouth daily    Gastroesophageal reflux disease, esophagitis presence not specified       * oxyCODONE IR 5 MG tablet    ROXICODONE    18 tablet    Take 1 tablet (5 mg) by mouth every 3 hours as needed for moderate to severe pain    Acute post-operative pain       * oxyCODONE IR 5 MG tablet    ROXICODONE    15 tablet    Take 1-2 tablets (5-10 mg) by mouth every 3 hours as needed for pain or other (Moderate to Severe)    Acute postoperative pain       potassium chloride SA 20 MEQ CR tablet    K-DUR/KLOR-CON M    90 tablet    Take 1 tablet (20 mEq) by mouth daily    Hypokalemia       pravastatin 10 MG tablet    PRAVACHOL    90 tablet    Take 2 tablets (20 mg) by mouth daily    Hyperlipidemia LDL goal <100       Propylene Glycol-Glycerin 1-0.3 % Soln    ARTIFICIAL TEARS    30 mL    Apply 4 drops to eye every 2 hours (while awake)    Post-operative state       RA VITAMIN B-12 TR 1000 MCG Tbcr   Generic drug:  cyanocobalamin      Take 1,000 mcg by mouth every morning        rifaximin 550 MG Tabs tablet    XIFAXAN    60 tablet    Take 1 tablet (550 mg) by mouth 2 times daily    Hepatic encephalopathy (H)       spironolactone 25 MG tablet    ALDACTONE    90 tablet    Take 1 tablet (25 mg) by mouth daily    Other ascites       THERAVITE PO      Take 1 tablet by mouth every morning        VITAMIN D-3 PO      Take 2,000 Units by mouth every morning        * Notice:  This list has 4  medication(s) that are the same as other medications prescribed for you. Read the directions carefully, and ask your doctor or other care provider to review them with you.

## 2017-11-16 NOTE — IP AVS SNAPSHOT
MRN:[id number]                      After Visit Summary   11/16/2017    Frandy Workman    MRN: [id number]           Thank you!     Thank you for choosing [facility] for your care. Our goal is always to provide you with excellent care. Hearing back from our patients is one way we can continue to improve our services. Please take a few minutes to complete the written survey that you may receive in the mail after you visit with us. Thank you!        Patient Information     Date Of Birth          1965        About your hospital stay     You were admitted on:  November 16, 2017 You last received care in the:  [facility]    You were discharged on:  November 16, 2017       Who to Call     For medical emergencies, please call 911.  For non-urgent questions about your medical care, please call your primary care provider or clinic, [phone]  For questions related to your surgery, please call your surgery clinic        Attending Provider     Provider Specialty    Milana Malave MD Otolaryngology       Primary Care Provider Office Phone # Fax #    Natalie Mitzi Andrés Russell [phone]      After Care Instructions     Diet Instructions       Resume pre procedure diet            Discharge Instructions       Patient to follow up with surgeon as previously scheduled.            No Alcohol       For 24 hours following procedure                  Your next 10 appointments already scheduled     Nov 16, 2017  4:00 PM CST   Infusion 60 with UC SPEC INFUSION, UC 50 ATC   [facility] Special[facility] ([facility])    [address]  [address]nd [facility] [id number] [phone]            Nov 17, 2017   Procedure with Santi Dotson MD   [facility] ([facility])    [address] [id number] [phone]           The  [facility] is located on the corner of [facility] and [address] [facility] on the [facility]. It is easily accessible from virtually any point in the [place] area, via [address] and [address].            Nov 17, 2017 12:00 PM CST   (Arrive by 11:45 AM)   Return Visit with Segrio Noel MD   [place] [facility] ([facility] [place])    [address]  3rd Floor  [facility] [address]   [phone]            Nov 17, 2017  4:00 PM CST   Infusion 60 with UC SPEC INFUSION, UC 49 ATC   [facility] Specialty and Procedure ([facility] [place])    [address] [address] [phone]            Nov 18, 2017  2:00 PM CST   Infusion 60 with UC SPEC INFUSION, UC 44 ATC   [facility] Specialty and Procedure ([facility] [place])    [address]  [address] [facility] [address] [phone]            Nov 19, 2017  2:00 PM CST   Infusion 60 with UC SPEC INFUSION, UC 50 ATC   [facility] Special[facility] and Procedure ([facility] [place])    [address] [address] [phone]              Further instructions from your care team       [facility]  Home Care Following Anesthesia  For 24 hours after surgery:  1. Get plenty of rest.  A responsible adult must stay with you for at least 24 hours after you leave the surgery center.  2. Do not drive or use heavy equipment.  If you have weakness or tingling, don't drive or use heavy equipment until this feeling goes away.   3. Do not drink alcohol.   4. Avoid strenuous or risky activities.  Ask for help when climbing stairs.  5. You may feel lightheaded.  IF so, sit for a few minutes before standing.  Have someone help you get up.   6. If you have nausea (feel sick  to your stomach): Drink only clear liquids such as apple juice, ginger ale, broth or 7-Up.  Rest may also help.  Be sure to drink enough fluids.  Move to a regular diet as you feel able.   7. You may have a slight fever.  Call the doctor if your fever is over 100 F (37.7 C) (taken under the tongue) or lasts longer than 24 hours.  8. You may have a dry mouth, a sore throat, muscle aches or trouble sleeping. These should go away after 24 hours.  9. Do not make important or legal decisions.      Tips for taking pain medications  To get the best pain relief possible, remember these points:    Take pain medications as directed, before pain becomes severe.    Pain medication can upset your stomach: taking it with food may help.    Constipation is a common side effect of pain medication. Drink plenty of  fluids.    Eat foods high in fiber. Take a stool softener if recommended by your doctor or pharmacist.    Do not drink alcohol, drive or operate machinery while taking pain medications.    Ask about other ways to control pain, such as with heat, ice or relaxation.    Tylenol/Acetaminophen Consumption  To help encourage the safe use of acetaminophen, the makers of TYLENOL  have lowered the maximum daily dose for single-ingredient Extra Strength TYLENOL  (acetaminophen) products sold in the [place] from 8 pills per day (4,000 mg) to 6 pills per day (3,000 mg). The dosing interval has also changed from 2 pills every 4-6 hours to 2 pills every 6 hours.    If you feel your pain relief is insufficient, you may take Tylenol/Acetaminophen in addition to your narcotic pain medication.     Be careful not to exceed 3,000 mg of Tylenol/Acetaminophen in a 24 hour period from all sources.    If you are taking extra strength Tylenol/acetaminophen (500 mg), the maximum dose is 6 tablets in 24 hours.    If you are taking regular strength acetaminophen (325 mg), the maximum dose is 9 tablets in 24 hours.    Call a doctor for any of the  "followin. Signs of infection (fever, growing tenderness at the surgery site, a large amount of drainage or bleeding, severe pain, foul-smelling drainage, redness, swelling).  2. It has been over 8 to 10 hours since surgery and you are still not able to urinate (pass water).  3. Headache for over 24 hours.  Your doctor is:  Dr. Milana Malave, Otolaryngology: [phone]                    Or dial [phone] and ask for the resident on call for:  ENT Otolaryngology  For emergency care, call the:  [facility]:  [phone] (TTY for hearing impaired: [phone])                Pending Results     No orders found from 2017 to 2017.            Admission Information     Date & Time Provider Department Dept. Phone    2017 Milana Malave MD [facility] [phone]      Your Vitals Were     Blood Pressure Pulse Temperature Respirations Height Weight    111/59 77 98.4  F (36.9  C) (Oral) 16 1.753 m (5' 9\") 85.3 kg (188 lb)    Pulse Oximetry BMI (Body Mass Index)                99% 27.76 kg/m2          [employer] Information     [employer] gives you secure access to your electronic health record. If you see a primary care provider, you can also send messages to your care team and make appointments. If you have questions, please call your primary care clinic.  If you do not have a primary care provider, please call [phone] and they will assist you.      [employer] is an electronic gateway that provides easy, online access to your medical records. With [employer], you can request a clinic appointment, read your test results, renew a prescription or communicate with your care team.     To access your existing account, please contact your [facility] or call [phone] for assistance.        Care EveryWhere ID     This is your Care EveryWhere ID. This could be used by other organizations to access your [facility] medical " records  [id number]        Equal Access to Services     [facility] MICHAEL : Hadii aad ku hadaricjanette Vanessa, waaxda porsha, qaybta dougmaluis valle, emy bolanosmelodyisabella vuong. So [facility] [phone].    ATENCIÓN: Si habla español, tiene a hanley disposición servicios gratuitos de asistencia lingüística. Llame al [phone].    We comply with applicable federal civil rights laws and [place] laws. We do not discriminate on the basis of race, color, national origin, age, disability, sex, sexual orientation, or gender identity.               Review of your medicines      START taking        Dose / Directions    acetaminophen 325 MG tablet   Commonly known as:  TYLENOL   Used for:  Acute postoperative pain        Dose:  650 mg   Take 2 tablets (650 mg) by mouth every 4 hours as needed for mild pain   Quantity:  50 tablet   Refills:  0       erythromycin ophthalmic ointment   Commonly known as:  ROMYCIN   Used for:  Post-operative state        Dose:  1 Application   Place 1 Application into the right eye 3 times daily   Quantity:  3.5 g   Refills:  1       Propylene Glycol-Glycerin 1-0.3 % Soln   Commonly known as:  ARTIFICIAL TEARS   Used for:  Post-operative state        Dose:  4 drop   Apply 4 drops to eye every 2 hours (while awake)   Quantity:  30 mL   Refills:  11         CONTINUE these medicines which may have CHANGED, or have new prescriptions. If we are uncertain of the size of tablets/capsules you have at home, strength may be listed as something that might have changed.        Dose / Directions    * artificial tears Oint ophthalmic ointment   This may have changed:  Another medication with the same name was added. Make sure you understand how and when to take each.   Used for:  Dry eyes        Dose:  1 inch   Place 1 g into the right eye At Bedtime   Quantity:  5 g   Refills:  3       * ARTIFICIAL TEARS Oint   This may have changed:  You were already taking a medication with the same name, and this  prescription was added. Make sure you understand how and when to take each.   Used for:  Post-operative state        Dose:  1 Application   Apply 1 Application to eye At Bedtime   Quantity:  3.5 g   Refills:  11       dapagliflozin 10 MG Tabs tablet   Commonly known as:  FARXIGA   This may have changed:  when to take this   Used for:  Type 2 diabetes mellitus with hyperglycemia, with long-term current use of insulin (H)        Dose:  10 mg   Take 1 tablet (10 mg) by mouth daily   Quantity:  90 tablet   Refills:  3       insulin degludec 200 UNIT/ML pen   Commonly known as:  TRESIBA   This may have changed:    - how much to take  - how to take this  - when to take this  - additional instructions   Used for:  Type 2 diabetes mellitus with hyperglycemia, with long-term current use of insulin (H)        Take 120 units daily.   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 (H)        Dose:  30 g   Take 45 mLs (30 g) by mouth 4 times daily   Quantity:  7200 mL   Refills:  11       omeprazole 40 MG capsule   Commonly known as:  priLOSEC   This may have changed:  when to take this   Used for:  Gastroesophageal reflux disease, esophagitis presence not specified        Dose:  40 mg   Take 1 capsule (40 mg) by mouth daily   Quantity:  90 capsule   Refills:  2       * oxyCODONE IR 5 MG tablet   Commonly known as:  ROXICODONE   This may have changed:  Another medication with the same name was added. Make sure you understand how and when to take each.   Used for:  Acute post-operative pain        Dose:  5 mg   Take 1 tablet (5 mg) by mouth every 3 hours as needed for moderate to severe pain   Quantity:  18 tablet   Refills:  0       * oxyCODONE IR 5 MG tablet   Commonly known as:  ROXICODONE   This may have changed:  You were already taking a medication with  the same name, and this prescription was added. Make sure you understand how and when to take each.   Used for:  Acute postoperative pain        Dose:  5-10 mg   Take 1-2 tablets (5-10 mg) by mouth every 3 hours as needed for pain or other (Moderate to Severe)   Quantity:  15 tablet   Refills:  0       potassium chloride SA 20 MEQ CR tablet   Commonly known as:  K-DUR/KLOR-CON M   This may have changed:  when to take this   Used for:  Hypokalemia        Dose:  20 mEq   Take 1 tablet (20 mEq) by mouth daily   Quantity:  90 tablet   Refills:  3       pravastatin 10 MG tablet   Commonly known as:  PRAVACHOL   This may have changed:    - how much to take  - when to take this   Used for:  Hyperlipidemia LDL goal <100        Dose:  20 mg   Take 2 tablets (20 mg) by mouth daily   Quantity:  90 tablet   Refills:  3       spironolactone 25 MG tablet   Commonly known as:  ALDACTONE   This may have changed:  when to take this   Used for:  Other ascites        Dose:  25 mg   Take 1 tablet (25 mg) by mouth daily   Quantity:  90 tablet   Refills:  1       * Notice:  This list has 4 medication(s) that are the same as other medications prescribed for you. Read the directions carefully, and ask your doctor or other care provider to review them with you.      CONTINUE these medicines which have NOT CHANGED        Dose / Directions    blood glucose monitoring lancets   Used for:  Type II diabetes mellitus (H)        Use to test blood sugar 4 times daily or as directed.  1 box = 102 lancets   Quantity:  408 each   Refills:  3       blood glucose monitoring test strip   Commonly known as:  ACCU-CHEK EDINSON PLUS   Used for:  Type II diabetes mellitus (H)        Use to test blood sugar 4 times daily   Quantity:  400 each   Refills:  3       carvedilol 12.5 MG tablet   Commonly known as:  COREG   Used for:  Liver cirrhosis secondary to ESTRADA (H), Secondary esophageal varices without bleeding (H)        Dose:  12.5 mg   Take 1 tablet (12.5  mg) by mouth 2 times daily (with meals)   Quantity:  180 tablet   Refills:  3       ertapenem 1 GM vial   Commonly known as:  INVanz   Indication:  Abscess   Used for:  Parotid abscess        Dose:  1 g   Inject 1 g into the vein every 24 hours for 14 days   Quantity:  10 each   Refills:  0       hypromellose-dextran Soln ophthalmic solution   Used for:  Dry eyes        Dose:  1 drop   Place 1 drop into the right eye every hour as needed for dry eyes Apply at least 4 times daily and as needed for dry eye   Quantity:  1 Bottle   Refills:  3       insulin aspart 100 UNIT/ML injection   Commonly known as:  NovoLOG FLEXPEN   Used for:  Type II diabetes mellitus (H)        1 unit per 4 Gms CHO at meals and snacks + correction scale of 1 unit per 25 mg/dL over 125. Average daily dose is 75 units.   Quantity:  24 mL   Refills:  11       insulin pen needle 32G X 4 MM   Commonly known as:  BD VIKTORIA U/F   Used for:  Type II diabetes mellitus (H)        Use as directed.   Quantity:  100 each   Refills:  11       lactobacillus rhamnosus (GG) capsule   Used for:  Long term (current) use of antibiotics        Dose:  1 capsule   Take 1 capsule by mouth 2 times daily for 14 days   Quantity:  28 capsule   Refills:  0       ofloxacin 0.3 % otic solution   Commonly known as:  FLOXIN   Used for:  Right ear pain        Dose:  5 drop   Place 5 drops into the right ear 2 times daily for 10 days   Quantity:  5 mL   Refills:  0       OLANZapine 2.5 MG tablet   Commonly known as:  zyPREXA   Used for:  Insomnia, unspecified type        Dose:  2.5 mg   Take 1 tablet (2.5 mg) by mouth At Bedtime   Quantity:  30 tablet   Refills:  5       RA VITAMIN B-12 TR 1000 MCG Tbcr   Generic drug:  cyanocobalamin        Dose:  1000 mcg   Take 1,000 mcg by mouth every morning   Refills:  0       rifaximin 550 MG Tabs tablet   Commonly known as:  XIFAXAN   Used for:  Hepatic encephalopathy (H)        Dose:  550 mg   Take 1 tablet (550 mg) by mouth 2 times  daily   Quantity:  60 tablet   Refills:  11       THERAVITE PO        Dose:  1 tablet   Take 1 tablet by mouth every morning   Refills:  0       VITAMIN D-3 PO        Dose:  2000 Units   Take 2,000 Units by mouth every morning   Refills:  0            Where to get your medicines      These medications were sent to [place], [place] - [address]  [address], [facility] [address]    Hours:  TRANSPLANT PHONE NUMBER [phone] Phone:  [phone]     acetaminophen 325 MG tablet    ARTIFICIAL TEARS Oint    erythromycin ophthalmic ointment    Propylene Glycol-Glycerin 1-0.3 % Soln         Some of these will need a paper prescription and others can be bought over the counter. Ask your nurse if you have questions.     Bring a paper prescription for each of these medications     oxyCODONE IR 5 MG tablet                Protect others around you: Learn how to safely use, store and throw away your medicines at www.disposemymeds.org.             Medication List: This is a list of all your medications and when to take them. Check marks below indicate your daily home schedule. Keep this list as a reference.      Medications           Morning Afternoon Evening Bedtime As Needed    acetaminophen 325 MG tablet   Commonly known as:  TYLENOL   Take 2 tablets (650 mg) by mouth every 4 hours as needed for mild pain                                * artificial tears Oint ophthalmic ointment   Place 1 g into the right eye At Bedtime                                * ARTIFICIAL TEARS Oint   Apply 1 Application to eye At Bedtime                                blood glucose monitoring lancets   Use to test blood sugar 4 times daily or as directed.  1 box = 102 lancets                                blood glucose monitoring test strip   Commonly known as:  ACCU-CHEK EDINSON PLUS   Use to test blood sugar 4 times daily                                carvedilol 12.5 MG tablet    Commonly known as:  COREG   Take 1 tablet (12.5 mg) by mouth 2 times daily (with meals)                                dapagliflozin 10 MG Tabs tablet   Commonly known as:  FARXIGA   Take 1 tablet (10 mg) by mouth daily                                ertapenem 1 GM vial   Commonly known as:  INVanz   Inject 1 g into the vein every 24 hours for 14 days                                erythromycin ophthalmic ointment   Commonly known as:  ROMYCIN   Place 1 Application into the right eye 3 times daily                                hypromellose-dextran Soln ophthalmic solution   Place 1 drop into the right eye every hour as needed for dry eyes Apply at least 4 times daily and as needed for dry eye                                insulin aspart 100 UNIT/ML injection   Commonly known as:  NovoLOG FLEXPEN   1 unit per 4 Gms CHO at meals and snacks + correction scale of 1 unit per 25 mg/dL over 125. Average daily dose is 75 units.                                insulin degludec 200 UNIT/ML pen   Commonly known as:  TRESIBA   Take 120 units daily.                                insulin pen needle 32G X 4 MM   Commonly known as:  BD VIKTORIA U/F   Use as directed.                                lactobacillus rhamnosus (GG) capsule   Take 1 capsule by mouth 2 times daily for 14 days                                lactulose 10 GM/15ML solution   Commonly known as:  CHRONULAC   Take 45 mLs (30 g) by mouth 4 times daily                                ofloxacin 0.3 % otic solution   Commonly known as:  FLOXIN   Place 5 drops into the right ear 2 times daily for 10 days                                OLANZapine 2.5 MG tablet   Commonly known as:  zyPREXA   Take 1 tablet (2.5 mg) by mouth At Bedtime                                omeprazole 40 MG capsule   Commonly known as:  priLOSEC   Take 1 capsule (40 mg) by mouth daily                                * oxyCODONE IR 5 MG tablet   Commonly known as:  ROXICODONE   Take 1 tablet (5 mg)  by mouth every 3 hours as needed for moderate to severe pain                                * oxyCODONE IR 5 MG tablet   Commonly known as:  ROXICODONE   Take 1-2 tablets (5-10 mg) by mouth every 3 hours as needed for pain or other (Moderate to Severe)                                potassium chloride SA 20 MEQ CR tablet   Commonly known as:  K-DUR/KLOR-CON M   Take 1 tablet (20 mEq) by mouth daily                                pravastatin 10 MG tablet   Commonly known as:  PRAVACHOL   Take 2 tablets (20 mg) by mouth daily                                Propylene Glycol-Glycerin 1-0.3 % Soln   Commonly known as:  ARTIFICIAL TEARS   Apply 4 drops to eye every 2 hours (while awake)                                RA VITAMIN B-12 TR 1000 MCG Tbcr   Take 1,000 mcg by mouth every morning   Generic drug:  cyanocobalamin                                rifaximin 550 MG Tabs tablet   Commonly known as:  XIFAXAN   Take 1 tablet (550 mg) by mouth 2 times daily                                spironolactone 25 MG tablet   Commonly known as:  ALDACTONE   Take 1 tablet (25 mg) by mouth daily                                THERAVITE PO   Take 1 tablet by mouth every morning                                VITAMIN D-3 PO   Take 2,000 Units by mouth every morning                                * Notice:  This list has 4 medication(s) that are the same as other medications prescribed for you. Read the directions carefully, and ask your doctor or other care provider to review them with you.

## 2017-11-16 NOTE — IP AVS SNAPSHOT
[facility]    [address] [place] [address]    Phone:  [phone]    Fax:  [phone]                                       After Visit Summary   11/16/2017    Frandy Workman    MRN: [id number]           After Visit Summary Signature Page     I have received my discharge instructions, and my questions have been answered. I have discussed any challenges I see with this plan with the nurse or doctor.    ..........................................................................................................................................  Patient/Patient Representative Signature      ..........................................................................................................................................  Patient Representative Print Name and Relationship to Patient    ..................................................               ................................................  Date                                            Time    ..........................................................................................................................................  Reviewed by Signature/Title    ...................................................              ..............................................  Date                                                            Time

## 2017-11-16 NOTE — PROGRESS NOTES
Infusion Nursing Note  Frandy Workman presents today to [facility] for:   No chief complaint on file.    During today's [facility] appointment, orders from  Kera Dillon PA-C were completed.  Frequency: daily and today is dose 8 of 14 total.    Progress note:  Patient identification verified by name and date of birth.  Assessment completed.  Vitals recorded in Doc Flowsheets.  Patient was provided with education regarding infusion and possible side effects.  Patient verbalized understanding.     Premedications: were not ordered.  Infusion Rates: infusion given over approximately 5 mins. followed by a 10 ml Saline flush  Labs: were not ordered for this appointment.  Vascular access: PICC accessed today.  Treatment Conditions: non-applicable.  Patient tolerated infusion: well    Discharge Plan:   Follow up plan of care with: ongoing infusions at [facility]. and primary medical doctor.  Discharge instructions were reviewed with patient.  Patient/representative verbalized understanding of discharge instructions and all questions answered.  Patient discharged from [facility] in stable condition.    Mary Beth Irvin RN    Administrations This Visit     ertapenem (INVanz) 1 g in 10 mL SWFI for IVP     Admin Date Action Dose Route Administered By             11/16/2017 Given 1 g Intravenous Mary Beth Irvin RN                               /59  Pulse 65  Temp 98.3  F (36.8  C) (Oral)  Resp 16  SpO2 100%

## 2017-11-17 ENCOUNTER — INFUSION THERAPY VISIT (OUTPATIENT)
Dept: INFUSION THERAPY | Facility: CLINIC | Age: 52
End: 2017-11-17
Attending: PHYSICIAN ASSISTANT
Payer: MEDICARE

## 2017-11-17 ENCOUNTER — HOSPITAL ENCOUNTER (OUTPATIENT)
Facility: CLINIC | Age: 52
Discharge: HOME | End: 2017-11-17
Attending: INTERNAL MEDICINE | Admitting: INTERNAL MEDICINE
Payer: MEDICARE

## 2017-11-17 ENCOUNTER — OFFICE VISIT (OUTPATIENT)
Dept: INFECTIOUS DISEASES | Facility: CLINIC | Age: 52
End: 2017-11-17
Attending: INTERNAL MEDICINE
Payer: MEDICARE

## 2017-11-17 ENCOUNTER — SURGERY (OUTPATIENT)
Age: 52
End: 2017-11-17

## 2017-11-17 VITALS
SYSTOLIC BLOOD PRESSURE: 115 MMHG | HEIGHT: 69 IN | BODY MASS INDEX: 27.85 KG/M2 | WEIGHT: 188 LBS | OXYGEN SATURATION: 95 % | DIASTOLIC BLOOD PRESSURE: 68 MMHG | RESPIRATION RATE: 23 BRPM

## 2017-11-17 VITALS
HEART RATE: 76 BPM | WEIGHT: 187 LBS | TEMPERATURE: 98.2 F | BODY MASS INDEX: 27.7 KG/M2 | OXYGEN SATURATION: 99 % | HEIGHT: 69 IN | SYSTOLIC BLOOD PRESSURE: 118 MMHG | DIASTOLIC BLOOD PRESSURE: 58 MMHG

## 2017-11-17 VITALS — DIASTOLIC BLOOD PRESSURE: 54 MMHG | SYSTOLIC BLOOD PRESSURE: 102 MMHG | HEART RATE: 71 BPM

## 2017-11-17 DIAGNOSIS — E11.3293 TYPE 2 DIABETES MELLITUS WITH MILD NONPROLIFERATIVE RETINOPATHY OF BOTH EYES WITHOUT MACULAR EDEMA, UNSPECIFIED LONG TERM INSULIN USE STATUS: ICD-10-CM

## 2017-11-17 DIAGNOSIS — K11.3 PAROTID ABSCESS: Primary | ICD-10-CM

## 2017-11-17 LAB — GLUCOSE BLDC GLUCOMTR-MCNC: 208 MG/DL (ref 70–99)

## 2017-11-17 PROCEDURE — 96374 THER/PROPH/DIAG INJ IV PUSH: CPT | Mod: XU

## 2017-11-17 PROCEDURE — 43235 EGD DIAGNOSTIC BRUSH WASH: CPT | Performed by: INTERNAL MEDICINE

## 2017-11-17 PROCEDURE — 25000128 H RX IP 250 OP 636: Mod: ZF | Performed by: PHYSICIAN ASSISTANT

## 2017-11-17 PROCEDURE — 27210995 ZZH RX 272: Mod: ZF | Performed by: PHYSICIAN ASSISTANT

## 2017-11-17 PROCEDURE — 25000128 H RX IP 250 OP 636: Performed by: INTERNAL MEDICINE

## 2017-11-17 PROCEDURE — 40000104 ZZH STATISTIC MODERATE SEDATION < 10 MIN: Performed by: INTERNAL MEDICINE

## 2017-11-17 PROCEDURE — 99212 OFFICE O/P EST SF 10 MIN: CPT | Mod: ZF

## 2017-11-17 PROCEDURE — 82962 GLUCOSE BLOOD TEST: CPT

## 2017-11-17 RX ORDER — ONDANSETRON 2 MG/ML
4 INJECTION INTRAMUSCULAR; INTRAVENOUS
Status: DISCONTINUED | OUTPATIENT
Start: 2017-11-17 | End: 2017-11-20 | Stop reason: HOSPADM

## 2017-11-17 RX ORDER — FENTANYL CITRATE 50 UG/ML
INJECTION, SOLUTION INTRAMUSCULAR; INTRAVENOUS PRN
Status: DISCONTINUED | OUTPATIENT
Start: 2017-11-17 | End: 2017-11-20 | Stop reason: HOSPADM

## 2017-11-17 RX ADMIN — WATER 1 G: 1 INJECTION INTRAMUSCULAR; INTRAVENOUS; SUBCUTANEOUS at 13:16

## 2017-11-17 RX ADMIN — MIDAZOLAM HYDROCHLORIDE 2 MG: 1 INJECTION, SOLUTION INTRAMUSCULAR; INTRAVENOUS at 10:32

## 2017-11-17 RX ADMIN — FENTANYL CITRATE 100 MCG: 50 INJECTION, SOLUTION INTRAMUSCULAR; INTRAVENOUS at 10:32

## 2017-11-17 ASSESSMENT — PAIN SCALES - GENERAL: PAINLEVEL: MODERATE PAIN (5)

## 2017-11-17 NOTE — NURSING NOTE
Chief Complaint   Patient presents with     Hospital F/U     Parotid abscess   Pt roomed, vitals, meds, and allergies reviewed with pt. Pt ready for provider.  Grant Cristina, CMA

## 2017-11-17 NOTE — IP AVS SNAPSHOT
[facility], [place], Endoscopy    [address] [facility] [address]    Phone:  [phone]                                       After Visit Summary   11/17/2017    Frandy Workman    MRN: [id number]           After Visit Summary Signature Page     I have received my discharge instructions, and my questions have been answered. I have discussed any challenges I see with this plan with the nurse or doctor.    ..........................................................................................................................................  Patient/Patient Representative Signature      ..........................................................................................................................................  Patient Representative Print Name and Relationship to Patient    ..................................................               ................................................  Date                                            Time    ..........................................................................................................................................  Reviewed by Signature/Title    ...................................................              ..............................................  Date                                                            Time

## 2017-11-17 NOTE — OR NURSING
Pt tolerated EGD very well.2 L O2 started soon after procedure started when sats were at 90%. Pt to repeat in 1 year per Md

## 2017-11-17 NOTE — PATIENT INSTRUCTIONS
- Continue Ertapenem till 11/20/17 and can remove PICC line   - continue daily probiotic   - if any fever, chills, pain in the oral/face - patient is advised to call / seek medical attention   - if diarrhea, advised to have stools checked for C.diff

## 2017-11-17 NOTE — IP AVS SNAPSHOT
MRN:[id number]                      After Visit Summary   11/17/2017    Frandy Workman    MRN: [id number]           Thank you!     Thank you for choosing [facility] for your care. Our goal is always to provide you with excellent care. Hearing back from our patients is one way we can continue to improve our services. Please take a few minutes to complete the written survey that you may receive in the mail after you visit with us. Thank you!        Patient Information     Date Of Birth          1965        About your hospital stay     You were admitted on:  November 17, 2017 You last received care in the:  [facility], Endoscopy    You were discharged on:  November 17, 2017       Who to Call     For medical emergencies, please call 911.  For non-urgent questions about your medical care, please call your primary care provider or clinic, [phone]  For questions related to your surgery, please call your surgery clinic        Attending Provider     Provider Specialty    Santi Rosas MD Gastroenterology       Primary Care Provider Office Phone # Fax #    Natalie Mitzi Andrés Russell [phone]      Your next 10 appointments already scheduled     Nov 17, 2017 12:00 PM CST   (Arrive by 11:45 AM)   Return Visit with Sergio Noel MD   [facility] and Infectious Diseases ([facility])    [address] [place] [id number] [phone]            Nov 17, 2017  4:00 PM CST   Infusion 60 with UC SPEC INFUSION, UC 49 ATC   [facility] Specialty and Procedure ([facility])    [address] [place] [id number] [phone]            Nov 18, 2017  2:00 PM CST   Infusion 60 with UC SPEC INFUSION, UC 44 ATC   [facility] Specialty and Procedure ([facility])    [address] [place] [address]  [facility] [address]   [phone]            Nov 19, 2017  2:00 PM CST   Infusion 60 with UC SPEC INFUSION, UC 50 ATC   [facility] Specialty and Procedure ([facility])    [address] [address]   [phone]            Nov 20, 2017  3:00 PM CST   Infusion 60 with UC SPEC INFUSION, UC 42 ATC   [facility] Specialty and Procedure ([facility])    [address] [place] [id number]   [phone]            Nov 21, 2017  3:00 PM CST   Infusion 60 with UC SPEC INFUSION   [facility] Specialty and Procedure ([facility])    [address] [place] [id number]   [phone]              Further instructions from your care team       Discharge Instructions after  Upper Endoscopy (EGD)    Activity and Diet  You were given medicine for pain. You may be dizzy or sleepy.  For 24 hours:    Do not drive or use heavy equipment.    Do not make important decisions.    Do not drink any alcohol.  __x_ You may return to your regular diet.    Discomfort  You may have a sore throat for 2 to 3 days. It may help to:    Avoid hot liquids for 24 hours.    Use sore throat lozenges.    Gargle as needed with salt water up to 4 times a day. Mix 1 cup of warm water  with 1 teaspoon of salt. Do not swallow.    You may take Tylenol (acetaminophen) for pain unless your doctor has told you not to.    Do not take aspirin or ibuprofen (Advil, Motrin) or other NSAIDS  (anti-inflammatory drugs) for _0__ days.    Follow-up    Other instructions____repeat in 1 year_________________________________    When to call us:  Problems are rare. Call right away if you have:    Unusual throat pain or trouble swallowing    Unusual pain in belly or chest that is not relieved by belching or passing air    Black stools (tar-like  "looking bowel movement)    Temperature above 100.6  F. (37.5  C).    If you vomit blood or have severe pain, go to an emergency room.    If you have questions, call:  Monday to Friday, 7 a.m. to 4:30 p.m.: Endoscopy: [phone] (We may have to call you back)    After hours: Hospital: [phone] (Ask for the GI fellow on call)    Pending Results     No orders found from 11/15/2017 to 11/18/2017.            Admission Information     Date & Time Provider Department Dept. Phone    11/17/2017 Santi Rosas MD [facility], [place], Endoscopy [phone]      Your Vitals Were     Blood Pressure Respirations Height Weight Pulse Oximetry BMI (Body Mass Index)    118/62 13 1.753 m (5' 9\") 85.3 kg (188 lb) 95% 27.76 kg/m2      MyChart Information     [employer] gives you secure access to your electronic health record. If you see a primary care provider, you can also send messages to your care team and make appointments. If you have questions, please call your primary care clinic.  If you do not have a primary care provider, please call [phone] and they will assist you.        Care EveryWhere ID     This is your Care EveryWhere ID. This could be used by other organizations to access your [place] medical records  [id number]        Equal Access to Services     MINDI RODRIGUEZ AH: Hadfrieda huntero Socassidyali, waaxda luqadaha, qaybta kaalmada emy valle. So [facility] [phone].    ATENCIÓN: Si habla español, tiene a hanley disposición servicios gratuitos de asistencia lingüística. Llame al [phone].    We comply with applicable federal civil rights laws and [place] laws. We do not discriminate on the basis of race, color, national origin, age, disability, sex, sexual orientation, or gender identity.               Review of your medicines      UNREVIEWED medicines. Ask your doctor about these medicines        Dose / Directions    * artificial tears Oint ophthalmic ointment   Used " for:  Dry eyes        Dose:  1 inch   Place 1 g into the right eye At Bedtime   Quantity:  5 g   Refills:  3       * ARTIFICIAL TEARS Oint   Used for:  Post-operative state        Dose:  1 Application   Apply 1 Application to eye At Bedtime   Quantity:  3.5 g   Refills:  11       carvedilol 12.5 MG tablet   Commonly known as:  COREG   Used for:  Liver cirrhosis secondary to ESTRADA (H), Secondary esophageal varices without bleeding (H)        Dose:  12.5 mg   Take 1 tablet (12.5 mg) by mouth 2 times daily (with meals)   Quantity:  180 tablet   Refills:  3       dapagliflozin 10 MG Tabs tablet   Commonly known as:  FARXIGA   Used for:  Type 2 diabetes mellitus with hyperglycemia, with long-term current use of insulin (H)        Dose:  10 mg   Take 1 tablet (10 mg) by mouth daily   Quantity:  90 tablet   Refills:  3       ertapenem 1 GM vial   Commonly known as:  INVanz   Indication:  Abscess   Used for:  Parotid abscess        Dose:  1 g   Inject 1 g into the vein every 24 hours for 14 days   Quantity:  10 each   Refills:  0       erythromycin ophthalmic ointment   Commonly known as:  ROMYCIN   Used for:  Post-operative state        Dose:  1 Application   Place 1 Application into the right eye 3 times daily   Quantity:  3.5 g   Refills:  1       hypromellose-dextran Soln ophthalmic solution   Used for:  Dry eyes        Dose:  1 drop   Place 1 drop into the right eye every hour as needed for dry eyes Apply at least 4 times daily and as needed for dry eye   Quantity:  1 Bottle   Refills:  3       insulin aspart 100 UNIT/ML injection   Commonly known as:  NovoLOG FLEXPEN   Used for:  Type II diabetes mellitus (H)        1 unit per 4 Gms CHO at meals and snacks + correction scale of 1 unit per 25 mg/dL over 125. Average daily dose is 75 units.   Quantity:  24 mL   Refills:  11       insulin degludec 200 UNIT/ML pen   Commonly known as:  TRESIBA   Used for:  Type 2 diabetes mellitus with hyperglycemia, with long-term current  use of insulin (H)        Take 120 units daily.   Quantity:  36 mL   Refills:  3       lactobacillus rhamnosus (GG) capsule   Used for:  Long term (current) use of antibiotics        Dose:  1 capsule   Take 1 capsule by mouth 2 times daily for 14 days   Quantity:  28 capsule   Refills:  0       lactulose 10 GM/15ML solution   Commonly known as:  CHRONULAC   Used for:  Liver cirrhosis secondary to ESTRADA (H)        Dose:  30 g   Take 45 mLs (30 g) by mouth 4 times daily   Quantity:  7200 mL   Refills:  11       ofloxacin 0.3 % otic solution   Commonly known as:  FLOXIN   Used for:  Right ear pain        Dose:  5 drop   Place 5 drops into the right ear 2 times daily for 10 days   Quantity:  5 mL   Refills:  0       OLANZapine 2.5 MG tablet   Commonly known as:  zyPREXA   Used for:  Insomnia, unspecified type        Dose:  2.5 mg   Take 1 tablet (2.5 mg) by mouth At Bedtime   Quantity:  30 tablet   Refills:  5       omeprazole 40 MG capsule   Commonly known as:  priLOSEC   Used for:  Gastroesophageal reflux disease, esophagitis presence not specified        Dose:  40 mg   Take 1 capsule (40 mg) by mouth daily   Quantity:  90 capsule   Refills:  2       * oxyCODONE IR 5 MG tablet   Commonly known as:  ROXICODONE   Used for:  Acute post-operative pain        Dose:  5 mg   Take 1 tablet (5 mg) by mouth every 3 hours as needed for moderate to severe pain   Quantity:  18 tablet   Refills:  0       * oxyCODONE IR 5 MG tablet   Commonly known as:  ROXICODONE   Used for:  Acute postoperative pain        Dose:  5-10 mg   Take 1-2 tablets (5-10 mg) by mouth every 3 hours as needed for pain or other (Moderate to Severe)   Quantity:  15 tablet   Refills:  0       potassium chloride SA 20 MEQ CR tablet   Commonly known as:  K-DUR/KLOR-CON M   Used for:  Hypokalemia        Dose:  20 mEq   Take 1 tablet (20 mEq) by mouth daily   Quantity:  90 tablet   Refills:  3       pravastatin 10 MG tablet   Commonly known as:  PRAVACHOL   Used  for:  Hyperlipidemia LDL goal <100        Dose:  20 mg   Take 2 tablets (20 mg) by mouth daily   Quantity:  90 tablet   Refills:  3       Propylene Glycol-Glycerin 1-0.3 % Soln   Commonly known as:  ARTIFICIAL TEARS   Used for:  Post-operative state        Dose:  4 drop   Apply 4 drops to eye every 2 hours (while awake)   Quantity:  30 mL   Refills:  11       RA VITAMIN B-12 TR 1000 MCG Tbcr   Generic drug:  cyanocobalamin        Dose:  1000 mcg   Take 1,000 mcg by mouth every morning   Refills:  0       rifaximin 550 MG Tabs tablet   Commonly known as:  XIFAXAN   Used for:  Hepatic encephalopathy (H)        Dose:  550 mg   Take 1 tablet (550 mg) by mouth 2 times daily   Quantity:  60 tablet   Refills:  11       spironolactone 25 MG tablet   Commonly known as:  ALDACTONE   Used for:  Other ascites        Dose:  25 mg   Take 1 tablet (25 mg) by mouth daily   Quantity:  90 tablet   Refills:  1       THERAVITE PO        Dose:  1 tablet   Take 1 tablet by mouth every morning   Refills:  0       VITAMIN D-3 PO        Dose:  2000 Units   Take 2,000 Units by mouth every morning   Refills:  0       * Notice:  This list has 4 medication(s) that are the same as other medications prescribed for you. Read the directions carefully, and ask your doctor or other care provider to review them with you.      CONTINUE these medicines which have NOT CHANGED        Dose / Directions    blood glucose monitoring lancets   Used for:  Type II diabetes mellitus (H)        Use to test blood sugar 4 times daily or as directed.  1 box = 102 lancets   Quantity:  408 each   Refills:  3       blood glucose monitoring test strip   Commonly known as:  ACCU-CHEK EDINSON PLUS   Used for:  Type II diabetes mellitus (H)        Use to test blood sugar 4 times daily   Quantity:  400 each   Refills:  3       insulin pen needle 32G X 4 MM   Commonly known as:  BD VIKTORIA U/F   Used for:  Type II diabetes mellitus (H)        Use as directed.   Quantity:  100 each    Refills:  11                Protect others around you: Learn how to safely use, store and throw away your medicines at www.disposemymeds.org.             Medication List: This is a list of all your medications and when to take them. Check marks below indicate your daily home schedule. Keep this list as a reference.      Medications           Morning Afternoon Evening Bedtime As Needed    * artificial tears Oint ophthalmic ointment   Place 1 g into the right eye At Bedtime                                * ARTIFICIAL TEARS Oint   Apply 1 Application to eye At Bedtime                                blood glucose monitoring lancets   Use to test blood sugar 4 times daily or as directed.  1 box = 102 lancets                                blood glucose monitoring test strip   Commonly known as:  ACCU-CHEK EDINSON PLUS   Use to test blood sugar 4 times daily                                carvedilol 12.5 MG tablet   Commonly known as:  COREG   Take 1 tablet (12.5 mg) by mouth 2 times daily (with meals)                                dapagliflozin 10 MG Tabs tablet   Commonly known as:  FARXIGA   Take 1 tablet (10 mg) by mouth daily                                ertapenem 1 GM vial   Commonly known as:  INVanz   Inject 1 g into the vein every 24 hours for 14 days                                erythromycin ophthalmic ointment   Commonly known as:  ROMYCIN   Place 1 Application into the right eye 3 times daily                                hypromellose-dextran Soln ophthalmic solution   Place 1 drop into the right eye every hour as needed for dry eyes Apply at least 4 times daily and as needed for dry eye                                insulin aspart 100 UNIT/ML injection   Commonly known as:  NovoLOG FLEXPEN   1 unit per 4 Gms CHO at meals and snacks + correction scale of 1 unit per 25 mg/dL over 125. Average daily dose is 75 units.                                insulin degludec 200 UNIT/ML pen   Commonly known as:   TRESIBA   Take 120 units daily.                                insulin pen needle 32G X 4 MM   Commonly known as:  BD VIKTORIA U/F   Use as directed.                                lactobacillus rhamnosus (GG) capsule   Take 1 capsule by mouth 2 times daily for 14 days                                lactulose 10 GM/15ML solution   Commonly known as:  CHRONULAC   Take 45 mLs (30 g) by mouth 4 times daily                                ofloxacin 0.3 % otic solution   Commonly known as:  FLOXIN   Place 5 drops into the right ear 2 times daily for 10 days                                OLANZapine 2.5 MG tablet   Commonly known as:  zyPREXA   Take 1 tablet (2.5 mg) by mouth At Bedtime                                omeprazole 40 MG capsule   Commonly known as:  priLOSEC   Take 1 capsule (40 mg) by mouth daily                                * oxyCODONE IR 5 MG tablet   Commonly known as:  ROXICODONE   Take 1 tablet (5 mg) by mouth every 3 hours as needed for moderate to severe pain                                * oxyCODONE IR 5 MG tablet   Commonly known as:  ROXICODONE   Take 1-2 tablets (5-10 mg) by mouth every 3 hours as needed for pain or other (Moderate to Severe)                                potassium chloride SA 20 MEQ CR tablet   Commonly known as:  K-DUR/KLOR-CON M   Take 1 tablet (20 mEq) by mouth daily                                pravastatin 10 MG tablet   Commonly known as:  PRAVACHOL   Take 2 tablets (20 mg) by mouth daily                                Propylene Glycol-Glycerin 1-0.3 % Soln   Commonly known as:  ARTIFICIAL TEARS   Apply 4 drops to eye every 2 hours (while awake)                                RA VITAMIN B-12 TR 1000 MCG Tbcr   Take 1,000 mcg by mouth every morning   Generic drug:  cyanocobalamin                                rifaximin 550 MG Tabs tablet   Commonly known as:  XIFAXAN   Take 1 tablet (550 mg) by mouth 2 times daily                                spironolactone 25 MG tablet    Commonly known as:  ALDACTONE   Take 1 tablet (25 mg) by mouth daily                                THERAVITE PO   Take 1 tablet by mouth every morning                                VITAMIN D-3 PO   Take 2,000 Units by mouth every morning                                * Notice:  This list has 4 medication(s) that are the same as other medications prescribed for you. Read the directions carefully, and ask your doctor or other care provider to review them with you.

## 2017-11-17 NOTE — MR AVS SNAPSHOT
After Visit Summary   11/17/2017    Frandy Workman    MRN: [id number]           Patient Information     Date Of Birth          1965        Visit Information        Provider Department      11/17/2017 4:00 PM UC 49 ATC; UC SPEC INFUSION [facility] Special[facility] Procedure        Today's Diagnoses     Parotid abscess    -  1       Follow-ups after your visit        Your next 10 appointments already scheduled     Nov 17, 2017  4:00 PM CST   Infusion 60 with UC SPEC INFUSION, UC 49 ATC   [facility] Special[facility] Procedure ([facility])    [address] [id number] [phone]            Nov 18, 2017  2:00 PM CST   Infusion 60 with UC SPEC INFUSION, UC 44 ATC   [facility] Procedure ([facility])    [address] [place] [address] [phone]            Nov 19, 2017  2:00 PM CST   Infusion 60 with UC SPEC INFUSION, UC 50 ATC   [facility] Procedure ([facility])    [address] [place] [address] [phone]            Nov 20, 2017  3:00 PM CST   Infusion 60 with UC SPEC INFUSION, UC 42 ATC   [facility] ([facility])    [address] [address] [phone]            Nov 21, 2017  3:00 PM CST   Infusion 60 with UC SPEC INFUSION, UC 49 ATC   [facility] Procedure ([facility])    [address] [place] [id number] [phone]              Who to contact     If you have questions or need follow up information about today's clinic visit or your schedule please contact [facility] SPECIALTY AND PROCEDURE  directly at [phone].  Normal or non-critical lab and imaging results will be communicated to you by MyChart, letter or phone within 4 business days after the clinic has received the results. If you do not hear from us within 7 days, please contact the clinic through [employer] or phone. If you have a critical or abnormal lab result, we will notify you by phone as soon as possible.  Submit refill requests through Signpost or call your pharmacy and they will forward the refill request to us. Please allow 3 business days for your refill to be completed.          Additional Information About Your Visit        [employer] Information     Signpost gives you secure access to your electronic health record. If you see a primary care provider, you can also send messages to your care team and make appointments. If you have questions, please call your primary care clinic.  If you do not have a primary care provider, please call [phone] and they will assist you.        Care EveryWhere ID     This is your Care EveryWhere ID. This could be used by other organizations to access your [facility] medical records  [id number]        Your Vitals Were     Pulse                   71            Blood Pressure from Last 3 Encounters:   11/17/17 102/54   11/17/17 115/68   11/17/17 118/58    Weight from Last 3 Encounters:   11/17/17 85.3 kg (188 lb)   11/17/17 84.8 kg (187 lb)   11/16/17 85.3 kg (188 lb)              Today, you had the following     No orders found for display         Today's Medication Changes      Notice     This visit is during an admission. Changes to the med list made in this visit will be reflected in the After Visit Summary of the admission.             Primary Care Provider Office Phone # Fax #    Natalie Russell [phone]       [address] [place] [address] [address] [place] [address]        Equal Access to Services     MINDI RODRIGUEZ : darío Wesley qaybta  emy kunz lesly mcdonald ah. Charla [facility] [phone].    ATENCIÓN: Si habla janis, tiene a hanley disposición servicios gratuitos de asistencia lingüística. Llame al [phone].    We comply with applicable federal civil rights laws and [place] laws. We do not discriminate on the basis of race, color, national origin, age, disability, sex, sexual orientation, or gender identity.            Thank you!     Thank you for choosing [facility] SPECIALTY AND PROCEDURE  for your care. Our goal is always to provide you with excellent care. Hearing back from our patients is one way we can continue to improve our services. Please take a few minutes to complete the written survey that you may receive in the mail after your visit with us. Thank you!             Your Updated Medication List - Protect others around you: Learn how to safely use, store and throw away your medicines at www.disposemymeds.org.      Notice     This visit is during an admission. Changes to the med list made in this visit will be reflected in the After Visit Summary of the admission.

## 2017-11-17 NOTE — OP NOTE
DATE OF SURGERY:  11/16/2017      FACULTY SURGEON:  Milana Leon MD      FELLOW:  Malena Owens MD   RESIDENT:  Donald Nolasco MD      PROCEDURES:   1.  Placement of right upper eyelid weight, platinum chain.   2.  Right lower lid tarsal strip/right lower lid tightening.      PREOPERATIVE DIAGNOSES:   1.  Lagophthalmos.   2.  Upper branch facial nerve paralysis.      POSTOPERATIVE DIAGNOSES:   1.   Lagophthalmos.   2.  Upper branch facial nerve paralysis.      FINDINGS:   1.  A 1.0 gram platinum chain weight was placed in the right upper eyelid.   2.  A right lower lid tarsal strip tightening procedure was performed with a lateral canthoplasty.      ESTIMATED BLOOD LOSS:  Less than 5 mL.      ANESTHESIA:  Local anesthesia with monitored anesthesia care.      COMPLICATIONS:  None.      INDICATIONS FOR PROCEDURE:  Mr. Workman is a 52-year-old male who underwent Right superficial parotidectomy November 2, 2017 for a parotid lesion/parotid abscess.  There was involvement of the upper branch of the facial nerve and he has had resulting weakness causing lagophthalmos and difficulty with protecting the cornea.  This has also resulted in lower lid laxity requiring lower lid tightening.  Patient was seen in clinic and offered right eyelid weight and lower lid tightening procedure with a tarsal strip and after discussion of risks, benefits and alternatives of the procedure, he agreed to proceed and presented on the day of surgery for above-listed procedure.         DESCRIPTION OF PROCEDURE:    The patient and family were met in the preoperative holding area and a written informed consent was obtained after discussion of risks, benefits, alternatives of procedure.  All questions were answered in detail.  The patient agreed to proceed with surgery.  The patient was then transferred to the operating room by the anesthesia team and placed supine on the operating room table. The patient was placed on nasal cannula  oxygen and was given light sedation.      A timeout procedure was then performed to confirm correct patient, the correct procedure, allergies, antibiotics and necessary prep for the procedure and all agreed to proceed.  Local anesthetic 1% lidocaine with 1:100,000 epinephrine was then injected into the planned incision site at the upper eyelid, as well as the right lower eyelid.  Once adequate local anesthetic had been allowed to take effect, the patient was prepped and draped in the sterile fashion.      A #15 blade was used to make an incision over the patient's upper eyelid supratarsal crease and dissection proceeded through orbicularis oculi muscle down to the level of the tarsal plate.  A pocket was created down to the level of the lash line and a 6-0 silk was used as a stay suture for the lower eyelid to protect the cornea during this portion of the dissection.  Next, a 1.0 g platinum chain weight was selected and placed snugly within the pocket.  This was noted to be seated well and did not require additional sutures.  The incision was then closed using 5-0 Vicryl suture for the deep layer followed by a 6-0 fast absorbing gut.      Next, attention was turned to the lower lid where a 15 blade was used to make an incision extending out from the lateral canthus of the right eye.  A Nichol scissor was used to perform a lateral canthotomy and cantholysis, and the lower lid was noted to be released at this point.  The skin was undermined over the tarsal plate and several millimeters of the lower lid skin was removed over the tarsal plate and then the lash line.  High temp electrocautery was used to maintain hemostasis of these incisions as well as to remove the conjunctival layer from the inner lamella of the lower eyelid.  At this point, the tarsal plate was pulled laterally and noted to have improved position with respect to the lower lid laxity.  A 5-0 Vicryl suture was then placed to recreate the lateral  canthus and this was placed on a snap hemostat.  Next, a 6-0 Prolene suture was used to place a suture through the tarsal plate and this was then fixated on the periosteum of the lateral orbital rim.  Hemostasis again ensured using the high temp electrocautery and the tarsal plate was then secured in place followed by the lateral canthal Vicryl suture.  The lower lid incision was then closed using a 5-0 Vicryl suture for the deep layer followed by 6-0 fast absorbing gut.  The patient's eyes throughout the procedure were kept moist with Balanced Saline Solution as well as Lacrilube ointment at the start of the case.      The patient's eye was then gently cleansed with BSS solution at the conclusion of the case and the patient was cleaned of prep solution, and erythromycin ophthalmic ointment was placed on the incisions.  The patient was then handed back over to the anesthesia team, who then transferred the patient to recovery in excellent condition.  There were no complications during the course of the procedure, and the patient tolerated the procedure well.      Please note that Dr. Eileen Pal  was present and scrubbed throughout the entirety of the procedure.      Dictated by Malena Owens MD   Fellow         EILEEN PAL MD       I was present, scrubbed for the entire procedure and performed key aspects. I agree with the note.     EILEEN PAL MD          D: 2017 20:10   T: 2017 08:31   MT: al      Name:     DAVID SANDERS   MRN:      [id number]        Account:        [id number]   :      1965           Procedure Date: 2017      Document: [id number]

## 2017-11-17 NOTE — DISCHARGE INSTRUCTIONS
Discharge Instructions after  Upper Endoscopy (EGD)    Activity and Diet  You were given medicine for pain. You may be dizzy or sleepy.  For 24 hours:    Do not drive or use heavy equipment.    Do not make important decisions.    Do not drink any alcohol.  __x_ You may return to your regular diet.    Discomfort  You may have a sore throat for 2 to 3 days. It may help to:    Avoid hot liquids for 24 hours.    Use sore throat lozenges.    Gargle as needed with salt water up to 4 times a day. Mix 1 cup of warm water  with 1 teaspoon of salt. Do not swallow.    You may take Tylenol (acetaminophen) for pain unless your doctor has told you not to.    Do not take aspirin or ibuprofen (Advil, Motrin) or other NSAIDS  (anti-inflammatory drugs) for _0__ days.    Follow-up    Other instructions____repeat in 1 year_________________________________    When to call us:  Problems are rare. Call right away if you have:    Unusual throat pain or trouble swallowing    Unusual pain in belly or chest that is not relieved by belching or passing air    Black stools (tar-like looking bowel movement)    Temperature above 100.6  F. (37.5  C).    If you vomit blood or have severe pain, go to an emergency room.    If you have questions, call:  Monday to Friday, 7 a.m. to 4:30 p.m.: Endoscopy: [phone] (We may have to call you back)    After hours: Hospital: [phone] (Ask for the GI fellow on call)

## 2017-11-17 NOTE — MR AVS SNAPSHOT
After Visit Summary   11/17/2017    Frandy Workman    MRN: [id number]           Patient Information     Date Of Birth          1965        Visit Information        Provider Department      11/17/2017 12:00 PM Sergio Noel MD [facility] and Infectious Diseases        Today's Diagnoses     Parotid abscess    -  1    Type 2 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, unspecified long term insulin use status (H)          Care Instructions    - Continue Ertapenem till 11/20/17 and can remove PICC line   - continue daily probiotic   - if any fever, chills, pain in the oral/face - patient is advised to call / seek medical attention   - if diarrhea, advised to have stools checked for C.diff               Follow-ups after your visit        Follow-up notes from your care team     Return in about 3 weeks (around 12/8/2017).      Your next 10 appointments already scheduled     Nov 17, 2017  4:00 PM CST   Infusion 60 with UC SPEC INFUSION, UC 49 ATC   [facility] Specialty and Procedure ([facility])    [address] [place] [address] [phone]            Nov 18, 2017  2:00 PM CST   Infusion 60 with UC SPEC INFUSION, UC 44 ATC   [facility] Specialty and Procedure ([facility])    [address] [place] [address] [phone]            Nov 19, 2017  2:00 PM CST   Infusion 60 with UC SPEC INFUSION, UC 50 ATC   [facility] Special[facility] and Procedure ([facility])    [address] [place] [address]   [phone]            Nov 20, 2017  3:00 PM CST   Infusion 60 with UC SPEC INFUSION, UC 42 ATC   [facility] Specialty and Procedure ([facility])    [address] [place] [address]  "Floor  [facility] [address] [phone]            Nov 21, 2017  3:00 PM CST   Infusion 60 with UC SPEC INFUSION, UC 49 ATC   [facility] Special[facility] Procedure ([facility])    [address]  [address]nd Floor  [facility] [id number] [phone]              Who to contact     If you have questions or need follow up information about today's clinic visit or your schedule please contact [facility] AND INFECTIOUS DISEASES directly at [phone].  Normal or non-critical lab and imaging results will be communicated to you by [employer], letter or phone within 4 business days after the clinic has received the results. If you do not hear from us within 7 days, please contact the clinic through [employer] or phone. If you have a critical or abnormal lab result, we will notify you by phone as soon as possible.  Submit refill requests through [employer] or call your pharmacy and they will forward the refill request to us. Please allow 3 business days for your refill to be completed.          Additional Information About Your Visit        [employer] Information     [employer] gives you secure access to your electronic health record. If you see a primary care provider, you can also send messages to your care team and make appointments. If you have questions, please call your primary care clinic.  If you do not have a primary care provider, please call [phone] and they will assist you.        Care EveryWhere ID     This is your Care EveryWhere ID. This could be used by other organizations to access your [place] medical records  [id number]        Your Vitals Were     Pulse Temperature Height Pulse Oximetry BMI (Body Mass Index)       76 98.2  F (36.8  C) (Oral) 1.753 m (5' 9\") 99% 27.62 kg/m2        Blood Pressure from Last 3 Encounters:   11/17/17 115/68   11/17/17 118/58   11/16/17 125/59    Weight from Last 3 Encounters:   11/17/17 85.3 kg (188 lb) "   11/17/17 84.8 kg (187 lb)   11/16/17 85.3 kg (188 lb)              Today, you had the following     No orders found for display         Today's Medication Changes      Notice     This visit is during an admission. Changes to the med list made in this visit will be reflected in the After Visit Summary of the admission.             Primary Care Provider Office Phone # Fax #    Natalie Mitzi Russell [phone]       [address] [place] [address]  [facility] [address]        Equal Access to Services     MINDI RODRIGUEZ : Hadii aad ku hadasho Soomaali, waaxda luqadaha, qaybta kaalmada adeegyada, waxay wiliamin hayaan osbaldo mcdonald . So [facility] [phone].    ATENCIÓN: Si habla español, tiene a hanley disposición servicios gratuitos de asistencia lingüística. [facility] [phone].    We comply with applicable federal civil rights laws and [place] laws. We do not discriminate on the basis of race, color, national origin, age, disability, sex, sexual orientation, or gender identity.            Thank you!     Thank you for choosing [facility] AND INFECTIOUS DISEASES  for your care. Our goal is always to provide you with excellent care. Hearing back from our patients is one way we can continue to improve our services. Please take a few minutes to complete the written survey that you may receive in the mail after your visit with us. Thank you!             Your Updated Medication List - Protect others around you: Learn how to safely use, store and throw away your medicines at www.disposemymeds.org.      Notice     This visit is during an admission. Changes to the med list made in this visit will be reflected in the After Visit Summary of the admission.

## 2017-11-17 NOTE — PROGRESS NOTES
Nursing Note  Frandy Workman [facility] for:   Chief Complaint   Patient presents with     Infusion     ertapenem     During today's [facility] appointment, orders from Fritz Dillon were completed.  Frequency: daily    Progress note:  Patient identification verified by name and date of birth.  Assessment completed.  Vitals recorded in Doc Flowsheets.  Patient was provided with education regarding infusion and possible side effects.  Patient verbalized understanding.      needed: No  Premedications: were not ordered.  Infusion Rates: infusion given over approximately 5 minute push follwed by 10cc NS.  Labs: were not ordered for this appointment.  Vascular access: PICC accessed today. and PICC dressing changed.  Patient tolerated infusion: well.      Discharge Plan:   Follow up plan of care with: ongoing infusions at [facility].  Discharge instructions were reviewed with patient.  Patient/representative verbalized understanding of discharge instructions and all questions answered.  Patient discharged from [facility] in stable condition.    Sara Killian RN    Administrations This Visit     ertapenem (INVanz) 1 g in 10 mL SWFI for IVP     Admin Date Action Dose Route Administered By             11/17/2017 Given 1 g Intravenous Sara Killian RN                          /54  Pulse 71

## 2017-11-17 NOTE — LETTER
11/17/2017      RE: Frandy Workman  [address]   [facility] [address]       INFECTIOUS DISEASES PROGRESS NOTE    [facility] Outpatient Visit    SUBJECTIVE:                                                      Frandy Workman is a 52 year old male who presents to clinic today for the following health issues: right parotid abscess     He feels better. No fever, chills. Swelling has subsided. Soft stools. No nausea, vomiting.    Yesterday he had placement of right upper eyelid weight and eye lid surgery.   He has stopped chewing tobacco. He tolerates current Ertapenem well      ROS:  CONSTITUTIONAL:NEGATIVE for fever, chills, change in weight  INTEGUMENTARY/SKIN: NEGATIVE for worrisome rashes, moles or lesions  EYES: NEGATIVE for vision changes or irritation  ENT/MOUTH: NEGATIVE for ear, mouth and throat problems  RESP:NEGATIVE for significant cough or SOB  CV: NEGATIVE for chest pain, palpitations or peripheral edema  GI: NEGATIVE for nausea, abdominal pain, heartburn, or change in bowel habits  : NEGATIVE for urinary frequency, hematuria or dysuria  MUSCULOSKELETAL: NEGATIVE for significant arthralgias or myalgia  NEURO: NEGATIVE for weakness, dizziness or paresthesias  ENDOCRINE: NEGATIVE for temperature intolerance, skin/hair changes  HEME/ALLERGY/IMMUNE: NEGATIVE for bleeding problems  PSYCHIATRIC: NEGATIVE for changes in mood or affect    Problem list and histories reviewed & adjusted, as indicated.  Additional history: as documented    PAST MEDICAL HISTORY:  Patient  has a past medical history of Anemia (2013); BPH (benign prostatic hyperplasia); Cholelithiasis; Conductive hearing loss (8/16/2017); Depressive disorder (1986); Gastroesophageal reflux disease (12/1/2014); Hepatitis (2014); Hyperlipidemia; Liver cirrhosis secondary to ESTRADA (H); Thrombocytopenia (H); and Type II diabetes mellitus (H). He also has no past medical history of PONV (postoperative nausea and vomiting).    PAST  "SURGICAL HISTORY:  Patient  has a past surgical history that includes knee surgery (Left); Esophagoscopy, gastroscopy, duodenoscopy (EGD), combined (N/A, 11/17/2016); Parotidectomy, radical neck dissection (Right, 11/2/2017); picc insertion (Left, 11/06/2017); vascular surgery; Head and neck surgery; and orthopedic surgery.    CURRENT MEDICATIONS:  No current outpatient prescriptions on file.       ALLERGY:  Allergies   Allergen Reactions     Codeine Other (See Comments)     Cannot take due to liver  Cannot tolerate oral narcotics     IMMUNIZATION HISTORY:  Immunization History   Administered Date(s) Administered     HEPA 12/29/2015, 08/16/2016     HepB 12/29/2015, 03/18/2016, 08/16/2016     Influenza Vaccine, 3 YRS +, IM (QUADRIVALENT W/PRESERVATIVES) 09/01/2017     Pneumococcal 23 valent 09/30/2015     TDAP Vaccine (Boostrix) 12/29/2015       SOCIAL HISTORY:  Has quit tobacco chewing, no alcohol     FAMILY HISTORY:  Patient's family history includes Asthma in his sister; CANCER in his father, maternal grandmother, and mother; CEREBROVASCULAR DISEASE in his mother; Colon Cancer (age of onset: 60) in his father; Colorectal Cancer in his father, maternal grandmother, and paternal grandmother; DIABETES in his mother; Depression in his mother and sister; Macular Degeneration in his father; Pancreatic Cancer (age of onset: 60) in his father; Prostate Cancer in his father and maternal grandfather; Substance Abuse in his maternal grandfather and maternal grandmother; Thyroid Disease in his mother and sister. There is no history of Liver Disease.    Labs reviewed in EPIC    OBJECTIVE:                                                    /58  Pulse 76  Temp 98.2  F (36.8  C) (Oral)  Ht 1.753 m (5' 9\")  Wt 84.8 kg (187 lb)  SpO2 99%  BMI 27.62 kg/m2 Body mass index is 27.62 kg/(m^2).   GENERAL: healthy, alert and no distress, right facial droop  EYES: right eye lid droop, PERRL and conjunctivae and sclerae " normal  HENT: mouth without ulcers or lesions, surgical site - improved, swelling has improved, non tender  NECK: no adenopathy, no asymmetry, masses, or scars and thyroid normal to palpation  RESP: lungs clear to auscultation - no rales, rhonchi or wheezes  CV: regular rate and rhythm, normal S1 S2,no murmur, click or rub, trace peripheral edema  ABDOMEN: soft, tender in 1 localized spot on RLQ  , no hepatosplenomegaly, no masses and bowel sounds normal  MS: no gross musculoskeletal defects noted, no edema  SKIN: no suspicious lesions or rashes  NEURO: Normal strength and tone, mentation intact and speech normal  PSYCH: mentation appears normal, affect normal  LYMPH: no cervical, supraclavicular, adenopathy       ASSESSMENT AND PLAN:                                                      1. Parotid abscess ( right side)   - s/p superficial parotidectomy on 11/2/17   - FNA 10/11/17 - grew S.salivarius   - intra-op cx -11/2/17 - S. anginosus   - in hospital - he received Unasyn and D/C on Ertapenem   - s/p 15 days of Unasyn/Ertapenem      2. ESTRADA cirrhosis     3. Diabetes mellitus     4. Tobacco chewing - quit     Plan:  - condition has improved   - to complete another 4 days of Ertapenem and can remove PICC   - follow-up on Dec 8, sooner if needed     Thank You for allowing me to participate in the care of this patient  Gretel Noel MD, M.Med.Sc  Division of Infectious Diseases and International Medicine  [facility]

## 2017-11-17 NOTE — PROGRESS NOTES
INFECTIOUS DISEASES PROGRESS NOTE    [facility] Outpatient Visit    SUBJECTIVE:                                                      Frandy Workman is a 52 year old male who presents to clinic today for the following health issues: right parotid abscess     He feels better. No fever, chills. Swelling has subsided. Soft stools. No nausea, vomiting.    Yesterday he had placement of right upper eyelid weight and eye lid surgery.   He has stopped chewing tobacco. He tolerates current Ertapenem well      ROS:  CONSTITUTIONAL:NEGATIVE for fever, chills, change in weight  INTEGUMENTARY/SKIN: NEGATIVE for worrisome rashes, moles or lesions  EYES: NEGATIVE for vision changes or irritation  ENT/MOUTH: NEGATIVE for ear, mouth and throat problems  RESP:NEGATIVE for significant cough or SOB  CV: NEGATIVE for chest pain, palpitations or peripheral edema  GI: NEGATIVE for nausea, abdominal pain, heartburn, or change in bowel habits  : NEGATIVE for urinary frequency, hematuria or dysuria  MUSCULOSKELETAL: NEGATIVE for significant arthralgias or myalgia  NEURO: NEGATIVE for weakness, dizziness or paresthesias  ENDOCRINE: NEGATIVE for temperature intolerance, skin/hair changes  HEME/ALLERGY/IMMUNE: NEGATIVE for bleeding problems  PSYCHIATRIC: NEGATIVE for changes in mood or affect    Problem list and histories reviewed & adjusted, as indicated.  Additional history: as documented    PAST MEDICAL HISTORY:  Patient  has a past medical history of Anemia (2013); BPH (benign prostatic hyperplasia); Cholelithiasis; Conductive hearing loss (8/16/2017); Depressive disorder (1986); Gastroesophageal reflux disease (12/1/2014); Hepatitis (2014); Hyperlipidemia; Liver cirrhosis secondary to ESTRADA (H); Thrombocytopenia (H); and Type II diabetes mellitus (H). He also has no past medical history of PONV (postoperative nausea and vomiting).    PAST SURGICAL HISTORY:  Patient  has a past surgical history that includes knee surgery  "(Left); Esophagoscopy, gastroscopy, duodenoscopy (EGD), combined (N/A, 11/17/2016); Parotidectomy, radical neck dissection (Right, 11/2/2017); picc insertion (Left, 11/06/2017); vascular surgery; Head and neck surgery; and orthopedic surgery.    CURRENT MEDICATIONS:  No current outpatient prescriptions on file.       ALLERGY:  Allergies   Allergen Reactions     Codeine Other (See Comments)     Cannot take due to liver  Cannot tolerate oral narcotics     IMMUNIZATION HISTORY:  Immunization History   Administered Date(s) Administered     HEPA 12/29/2015, 08/16/2016     HepB 12/29/2015, 03/18/2016, 08/16/2016     Influenza Vaccine, 3 YRS +, IM (QUADRIVALENT W/PRESERVATIVES) 09/01/2017     Pneumococcal 23 valent 09/30/2015     TDAP Vaccine (Boostrix) 12/29/2015       SOCIAL HISTORY:  Has quit tobacco chewing, no alcohol     FAMILY HISTORY:  Patient's family history includes Asthma in his sister; CANCER in his father, maternal grandmother, and mother; CEREBROVASCULAR DISEASE in his mother; Colon Cancer (age of onset: 60) in his father; Colorectal Cancer in his father, maternal grandmother, and paternal grandmother; DIABETES in his mother; Depression in his mother and sister; Macular Degeneration in his father; Pancreatic Cancer (age of onset: 60) in his father; Prostate Cancer in his father and maternal grandfather; Substance Abuse in his maternal grandfather and maternal grandmother; Thyroid Disease in his mother and sister. There is no history of Liver Disease.    Labs reviewed in EPIC    OBJECTIVE:                                                    /58  Pulse 76  Temp 98.2  F (36.8  C) (Oral)  Ht 1.753 m (5' 9\")  Wt 84.8 kg (187 lb)  SpO2 99%  BMI 27.62 kg/m2 Body mass index is 27.62 kg/(m^2).   GENERAL: healthy, alert and no distress, right facial droop  EYES: right eye lid droop, PERRL and conjunctivae and sclerae normal  HENT: mouth without ulcers or lesions, surgical site - improved, swelling has " improved, non tender  NECK: no adenopathy, no asymmetry, masses, or scars and thyroid normal to palpation  RESP: lungs clear to auscultation - no rales, rhonchi or wheezes  CV: regular rate and rhythm, normal S1 S2,no murmur, click or rub, trace peripheral edema  ABDOMEN: soft, tender in 1 localized spot on RLQ  , no hepatosplenomegaly, no masses and bowel sounds normal  MS: no gross musculoskeletal defects noted, no edema  SKIN: no suspicious lesions or rashes  NEURO: Normal strength and tone, mentation intact and speech normal  PSYCH: mentation appears normal, affect normal  LYMPH: no cervical, supraclavicular, adenopathy       ASSESSMENT AND PLAN:                                                      1. Parotid abscess ( right side)   - s/p superficial parotidectomy on 11/2/17   - FNA 10/11/17 - grew S.salivarius   - intra-op cx -11/2/17 - S. anginosus   - in hospital - he received Unasyn and D/C on Ertapenem   - s/p 15 days of Unasyn/Ertapenem      2. ESTRADA cirrhosis     3. Diabetes mellitus     4. Tobacco chewing - quit     Plan:  - condition has improved   - to complete another 4 days of Ertapenem and can remove PICC   - follow-up on Dec 8, sooner if needed       Thank You for allowing me to participate in the care of this patient    Gretel Noel MD, M.Med.Sc  Division of Infectious Diseases and International Medicine  [facility]

## 2017-11-17 NOTE — LETTER
11/17/2017       RE: Frandy Workman  [address]   [facility] [address]     Dear Colleague,    Thank you for referring your patient, Frandy Workman, to the [facility] AND INFECTIOUS DISEASES at [facility]. Please see a copy of my visit note below.    INFECTIOUS DISEASES PROGRESS NOTE    [facility] Outpatient Visit    SUBJECTIVE:                                                      Frandy Workman is a 52 year old male who presents to clinic today for the following health issues: right parotid abscess     He feels better. No fever, chills. Swelling has subsided. Soft stools. No nausea, vomiting.    Yesterday he had placement of right upper eyelid weight and eye lid surgery.   He has stopped chewing tobacco. He tolerates current Ertapenem well      ROS:  CONSTITUTIONAL:NEGATIVE for fever, chills, change in weight  INTEGUMENTARY/SKIN: NEGATIVE for worrisome rashes, moles or lesions  EYES: NEGATIVE for vision changes or irritation  ENT/MOUTH: NEGATIVE for ear, mouth and throat problems  RESP:NEGATIVE for significant cough or SOB  CV: NEGATIVE for chest pain, palpitations or peripheral edema  GI: NEGATIVE for nausea, abdominal pain, heartburn, or change in bowel habits  : NEGATIVE for urinary frequency, hematuria or dysuria  MUSCULOSKELETAL: NEGATIVE for significant arthralgias or myalgia  NEURO: NEGATIVE for weakness, dizziness or paresthesias  ENDOCRINE: NEGATIVE for temperature intolerance, skin/hair changes  HEME/ALLERGY/IMMUNE: NEGATIVE for bleeding problems  PSYCHIATRIC: NEGATIVE for changes in mood or affect    Problem list and histories reviewed & adjusted, as indicated.  Additional history: as documented    PAST MEDICAL HISTORY:  Patient  has a past medical history of Anemia (2013); BPH (benign prostatic hyperplasia); Cholelithiasis; Conductive hearing loss (8/16/2017); Depressive disorder (1986); Gastroesophageal reflux disease  (12/1/2014); Hepatitis (2014); Hyperlipidemia; Liver cirrhosis secondary to ESTRADA (H); Thrombocytopenia (H); and Type II diabetes mellitus (H). He also has no past medical history of PONV (postoperative nausea and vomiting).    PAST SURGICAL HISTORY:  Patient  has a past surgical history that includes knee surgery (Left); Esophagoscopy, gastroscopy, duodenoscopy (EGD), combined (N/A, 11/17/2016); Parotidectomy, radical neck dissection (Right, 11/2/2017); picc insertion (Left, 11/06/2017); vascular surgery; Head and neck surgery; and orthopedic surgery.    CURRENT MEDICATIONS:  No current outpatient prescriptions on file.       ALLERGY:  Allergies   Allergen Reactions     Codeine Other (See Comments)     Cannot take due to liver  Cannot tolerate oral narcotics     IMMUNIZATION HISTORY:  Immunization History   Administered Date(s) Administered     HEPA 12/29/2015, 08/16/2016     HepB 12/29/2015, 03/18/2016, 08/16/2016     Influenza Vaccine, 3 YRS +, IM (QUADRIVALENT W/PRESERVATIVES) 09/01/2017     Pneumococcal 23 valent 09/30/2015     TDAP Vaccine (Boostrix) 12/29/2015       SOCIAL HISTORY:  Has quit tobacco chewing, no alcohol     FAMILY HISTORY:  Patient's family history includes Asthma in his sister; CANCER in his father, maternal grandmother, and mother; CEREBROVASCULAR DISEASE in his mother; Colon Cancer (age of onset: 60) in his father; Colorectal Cancer in his father, maternal grandmother, and paternal grandmother; DIABETES in his mother; Depression in his mother and sister; Macular Degeneration in his father; Pancreatic Cancer (age of onset: 60) in his father; Prostate Cancer in his father and maternal grandfather; Substance Abuse in his maternal grandfather and maternal grandmother; Thyroid Disease in his mother and sister. There is no history of Liver Disease.    Labs reviewed in EPIC    OBJECTIVE:                                                    /58  Pulse 76  Temp 98.2  F (36.8  C) (Oral)  Ht  "1.753 m (5' 9\")  Wt 84.8 kg (187 lb)  SpO2 99%  BMI 27.62 kg/m2 Body mass index is 27.62 kg/(m^2).   GENERAL: healthy, alert and no distress, right facial droop  EYES: right eye lid droop, PERRL and conjunctivae and sclerae normal  HENT: mouth without ulcers or lesions, surgical site - improved, swelling has improved, non tender  NECK: no adenopathy, no asymmetry, masses, or scars and thyroid normal to palpation  RESP: lungs clear to auscultation - no rales, rhonchi or wheezes  CV: regular rate and rhythm, normal S1 S2,no murmur, click or rub, trace peripheral edema  ABDOMEN: soft, tender in 1 localized spot on RLQ  , no hepatosplenomegaly, no masses and bowel sounds normal  MS: no gross musculoskeletal defects noted, no edema  SKIN: no suspicious lesions or rashes  NEURO: Normal strength and tone, mentation intact and speech normal  PSYCH: mentation appears normal, affect normal  LYMPH: no cervical, supraclavicular, adenopathy       ASSESSMENT AND PLAN:                                                      1. Parotid abscess ( right side)   - s/p superficial parotidectomy on 11/2/17   - FNA 10/11/17 - grew S.salivarius   - intra-op cx -11/2/17 - S. anginosus   - in hospital - he received Unasyn and D/C on Ertapenem   - s/p 15 days of Unasyn/Ertapenem      2. ESTRADA cirrhosis     3. Diabetes mellitus     4. Tobacco chewing - quit     Plan:  - condition has improved   - to complete another 4 days of Ertapenem and can remove PICC   - follow-up on Dec 8, sooner if needed       Thank You for allowing me to participate in the care of this patient    Gretel Noel MD, M.Med.Sc  Division of Infectious Diseases and International Medicine  [facility]          "

## 2017-11-18 ENCOUNTER — INFUSION THERAPY VISIT (OUTPATIENT)
Dept: INFUSION THERAPY | Facility: CLINIC | Age: 52
End: 2017-11-18
Attending: PHYSICIAN ASSISTANT
Payer: MEDICARE

## 2017-11-18 VITALS
OXYGEN SATURATION: 99 % | DIASTOLIC BLOOD PRESSURE: 58 MMHG | RESPIRATION RATE: 16 BRPM | TEMPERATURE: 98.4 F | HEART RATE: 74 BPM | SYSTOLIC BLOOD PRESSURE: 123 MMHG

## 2017-11-18 DIAGNOSIS — K11.3 PAROTID ABSCESS: Primary | ICD-10-CM

## 2017-11-18 LAB — UPPER GI ENDOSCOPY: NORMAL

## 2017-11-18 PROCEDURE — 25000128 H RX IP 250 OP 636: Mod: ZF | Performed by: PHYSICIAN ASSISTANT

## 2017-11-18 PROCEDURE — 96374 THER/PROPH/DIAG INJ IV PUSH: CPT

## 2017-11-18 PROCEDURE — 27210995 ZZH RX 272: Mod: ZF | Performed by: PHYSICIAN ASSISTANT

## 2017-11-18 RX ADMIN — WATER 1 G: 1 INJECTION INTRAMUSCULAR; INTRAVENOUS; SUBCUTANEOUS at 14:06

## 2017-11-18 NOTE — MR AVS SNAPSHOT
After Visit Summary   11/18/2017    Frandy Workman    MRN: [id number]           Patient Information     Date Of Birth          1965        Visit Information        Provider Department      11/18/2017 2:00 PM UC 44 ATC; UC SPEC INFUSION [facility] Specialty and Procedure        Today's Diagnoses     Parotid abscess    -  1       Follow-ups after your visit        Your next 10 appointments already scheduled     Nov 19, 2017  2:00 PM CST   Infusion 60 with UC SPEC INFUSION, UC 50 ATC   [facility] Specialty and Procedure ([facility])    [address]  [address] [facility] [address]   [phone]            Nov 20, 2017  3:00 PM CST   Infusion 60 with UC SPEC INFUSION, UC 42 ATC   [facility] Specialty and Procedure ([facility])    [address] [place] [address]   [phone]            Nov 21, 2017  3:00 PM CST   Infusion 60 with UC SPEC INFUSION, UC 49 ATC   [facility] Specialty and Procedure ([facility])    [address] [place] [address]  [address] [facility] [address]   [phone]            Nov 22, 2017  1:00 PM CST   Nurse Visit with Uc Ent Nurse   [facility] Ear Nose and Throat ([facility])    [address] [place] [address]  [address] [facility] [address] [phone]            Dec 01, 2017  3:40 PM CST   (Arrive by 3:25 PM)   Return Visit with Asiya Morgan MD   [facility] Ear Nose and Throat ([facility])    [address] [place] [address]  [address] [facility] [address]   [phone]            Dec 08, 2017 10:00 AM CST   (Arrive by 9:45 AM)   Return Visit with Sergio Noel MD   [facility] and Infectious Diseases ([facility])    [address] [place] [address]  [address] [facility] [address] [phone]             Dec 11, 2017  2:00 PM CST   (Arrive by 1:45 PM)   Return Visit with Natalie Russell MD   [facility] ([facility])    [address]  [address] [facility] [address] [phone]              Who to contact     If you have questions or need follow up information about today's clinic visit or your schedule please contact [facility] SPECIALTY AND PROCEDURE directly at [phone].  Normal or non-critical lab and imaging results will be communicated to you by [employer], letter or phone within 4 business days after the clinic has received the results. If you do not hear from us within 7 days, please contact the clinic through [employer] or phone. If you have a critical or abnormal lab result, we will notify you by phone as soon as possible.  Submit refill requests through [employer] or call your pharmacy and they will forward the refill request to us. Please allow 3 business days for your refill to be completed.          Additional Information About Your Visit        [employer] Information     [employer] gives you secure access to your electronic health record. If you see a primary care provider, you can also send messages to your care team and make appointments. If you have questions, please call your primary care clinic.  If you do not have a primary care provider, please call [phone] and they will assist you.        Care EveryWhere ID     This is your Care EveryWhere ID. This could be used by other organizations to access your [facility] medical records  [id number]        Your Vitals Were     Pulse Temperature Respirations Pulse Oximetry          74 98.4  F (36.9  C) (Oral) 16 99%         Blood Pressure from Last 3 Encounters:   11/17/17 115/68   11/18/17 123/58   11/17/17 102/54    Weight from Last 3 Encounters:   11/17/17 85.3 kg (188 lb)   11/17/17 84.8 kg (187 lb)   11/16/17 85.3 kg (188 lb)              Today, you had  the following     No orders found for display         Today's Medication Changes      Notice     This visit is during an admission. Changes to the med list made in this visit will be reflected in the After Visit Summary of the admission.             Primary Care Provider Office Phone # Fax #    Natalie Mitzi Russell [phone]       [address] [place] [address]  [facility] [address]        Equal Access to Services     MINDI RODRIGUEZ : Hadii aad ku hadasho Soomaali, waaxda luqadaha, qaybta kaalmada adeegyada, waxay idiin hayaan adejasson bolanosarash laceleste . So [facility] [phone].    ATENCIÓN: Si habla español, tiene a hanley disposición servicios gratuitos de asistencia lingüística. [facility] [phone].    We comply with applicable federal civil rights laws and [place] laws. We do not discriminate on the basis of race, color, national origin, age, disability, sex, sexual orientation, or gender identity.            Thank you!     Thank you for choosing [facility] SPECIALTY AND PROCEDURE  for your care. Our goal is always to provide you with excellent care. Hearing back from our patients is one way we can continue to improve our services. Please take a few minutes to complete the written survey that you may receive in the mail after your visit with us. Thank you!             Your Updated Medication List - Protect others around you: Learn how to safely use, store and throw away your medicines at www.disposemymeds.org.      Notice     This visit is during an admission. Changes to the med list made in this visit will be reflected in the After Visit Summary of the admission.

## 2017-11-18 NOTE — PROGRESS NOTES
Infusion Nursing Note  Frandy Workman [facility] for:   No chief complaint on file.    During [facility]'s [facility] appointment, orders from  Kera Dillon PA-C were completed.  Frequency: daily and today is dose 10 of 14 total.    Progress note:  Patient identification verified by name and date of birth.  Assessment completed.  Vitals recorded in Doc Flowsheets.  Patient was provided with education regarding infusion and possible side effects.  Patient verbalized understanding.     Premedications: were not ordered.  Infusion Rates: infusion given over approximately 5 mins. followed by a 10 ml Saline flush  Labs: were not ordered for this appointment.  Vascular access: PICC accessed today.  Treatment Conditions: non-applicable.  Patient tolerated infusion: well    Discharge Plan:   Follow up plan of care with: ongoing infusions at [facility]. and primary medical doctor.  Discharge instructions were reviewed with patient.  Patient/representative verbalized understanding of discharge instructions and all questions answered.  Patient discharged from [facility] in stable condition.    Mary Beth Irvin RN    Administrations This Visit     ertapenem (INVanz) 1 g in 10 mL SWFI for IVP     Admin Date Action Dose Route Administered By             11/18/2017 Given 1 g Intravenous Mary Beth Irvin RN                               /58  Pulse 74  Temp 98.4  F (36.9  C) (Oral)  Resp 16  SpO2 99%

## 2017-11-19 ENCOUNTER — INFUSION THERAPY VISIT (OUTPATIENT)
Dept: INFUSION THERAPY | Facility: CLINIC | Age: 52
End: 2017-11-19
Attending: PHYSICIAN ASSISTANT
Payer: MEDICARE

## 2017-11-19 VITALS
TEMPERATURE: 96.4 F | OXYGEN SATURATION: 99 % | HEIGHT: 69 IN | DIASTOLIC BLOOD PRESSURE: 55 MMHG | SYSTOLIC BLOOD PRESSURE: 115 MMHG | HEART RATE: 72 BPM | RESPIRATION RATE: 16 BRPM

## 2017-11-19 DIAGNOSIS — K11.3 PAROTID ABSCESS: Primary | ICD-10-CM

## 2017-11-19 PROCEDURE — 25000128 H RX IP 250 OP 636: Mod: ZF | Performed by: PHYSICIAN ASSISTANT

## 2017-11-19 PROCEDURE — 96374 THER/PROPH/DIAG INJ IV PUSH: CPT

## 2017-11-19 PROCEDURE — 99213 OFFICE O/P EST LOW 20 MIN: CPT | Mod: ZF

## 2017-11-19 PROCEDURE — 27210995 ZZH RX 272: Mod: ZF | Performed by: PHYSICIAN ASSISTANT

## 2017-11-19 RX ADMIN — WATER 1 G: 1 INJECTION INTRAMUSCULAR; INTRAVENOUS; SUBCUTANEOUS at 14:25

## 2017-11-19 ASSESSMENT — PAIN SCALES - GENERAL: PAINLEVEL: MODERATE PAIN (5)

## 2017-11-19 NOTE — PROGRESS NOTES
"Nursing Note  Frandy Workman [facility] for:   Chief Complaint   Patient presents with     Infusion     Return visit related to SI INVANZ     During [facility]'s [facility] appointment, orders from Kera Dillon PA-C were completed.  Frequency: today is dose 11 of 14    Progress note:  Patient identification verified by name and date of birth.  Assessment completed.  Vitals recorded in Doc Flowsheets.  Patient was provided with education regarding infusion and possible side effects.  Patient verbalized understanding.      needed: No  Premedications: were not ordered.  Infusion Rates: infusion given over approximately 5 minutes followed by a 10ml Saline Flush.  Approximate Infusion length:30 minutes.   Labs: were not ordered for this appointment.  Vascular access: PICC accessed today.  Treatment Conditions: patient denies fever, chills, signs of infection, recent illness, on antibiotics, productive cough or elevated temperature.  Patient tolerated infusion: well    Discharge Plan:   Follow up plan of care with: ongoing infusions at [facility].  Discharge instructions were reviewed with patient.  Patient/representative verbalized understanding of discharge instructions and all questions answered.  [facility] discharged [facility] in stable condition.    Sera Hurst RN        /55  Pulse 72  Temp 96.4  F (35.8  C) (Tympanic)  Resp 16  Ht 1.753 m (5' 9.02\")  SpO2 99%    Administrations This Visit     ertapenem (INVanz) 1 g in 10 mL SWFI for IVP     Admin Date Action Dose Route Administered By             11/19/2017 Given 1 g Intravenous Sera Hurst RN                          "

## 2017-11-19 NOTE — MR AVS SNAPSHOT
After Visit Summary   11/19/2017    Frandy Workman    MRN: [id number]           Patient Information     Date Of Birth          1965        Visit Information        Provider Department      11/19/2017 2:00 PM UC 50 ATC; UC SPEC [facility] [place] [facility]        Today's Diagnoses     Parotid abscess    -  1      Care Instructions    Dear Frandy Workman    Thank you for choosing [facility] ([facility]) for your infusion.     We look forward in seeing you on your next appointment here at [facility].  Please don t hesitate to call us at [phone] to reschedule any of your appointments or to speak with one of the [facility] registered nurses.  It was a pleasure taking care of you today.    Sincerely,    [facility]  [facility]  [address] [place] [address]  [address]  Phone:  [phone]    Ertapenem Sodium Solution for injection  What is this medicine?  ERTAPENEM (er ta PEN em) is a carbapenem antibiotic. It is used to treat certain kinds of bacterial infections. It will not work for colds, flu, or other viral infections.  This medicine may be used for other purposes; ask your health care provider or pharmacist if you have questions.  What should I tell my health care provider before I take this medicine?  They need to know if you have any of these conditions:    brain tumor, lesion    kidney disease    seizure disorder    an unusual or allergic reaction to ertapenem, other antibiotics, amide local anesthetics like lidocaine, or other medicines, foods, dyes, or preservatives    pregnant or trying to get pregnant    breast-feeding  How should I use this medicine?  This medicine is infused into a vein or injected deep into a muscle. It is usually given by a health care professional in a hospital or clinic setting.  If you get this medicine at home, you  will be taught how to prepare and give this medicine. Use exactly as directed. Take your medicine at regular intervals. Do not take your medicine more often than directed.  It is important that you put your used needles and syringes in a special sharps container. Do not put them in a trash can. If you do not have a sharps container, call your pharmacist or healthcare provider to get one.  Talk to your pediatrician regarding the use of this medicine in children. While this drug may be prescribed for children as young as 3 months old for selected conditions, precautions do apply.  Overdosage: If you think you have taken too much of this medicine contact a poison control center or emergency room at once.  NOTE: This medicine is only for you. Do not share this medicine with others.  What if I miss a dose?  If you miss a dose, take it as soon as you can. If it is almost time for your next dose, take only that dose. Do not take double or extra doses.  What may interact with this medicine?    birth control pills    probenecid  This list may not describe all possible interactions. Give your health care provider a list of all the medicines, herbs, non-prescription drugs, or dietary supplements you use. Also tell them if you smoke, drink alcohol, or use illegal drugs. Some items may interact with your medicine.  What should I watch for while using this medicine?  Tell your doctor or health care professional if your symptoms do not improve or if you get new symptoms. Your doctor will monitor your condition and blood work as needed.  Do not treat diarrhea with over the counter products. Contact your doctor if you have diarrhea that lasts more than 2 days or if it is severe and watery.  What side effects may I notice from receiving this medicine?  Side effects that you should report to your doctor or health care professional as soon as possible:    allergic reactions like skin rash, itching or hives, swelling of the face, lips,  or tongue    anxiety, confusion, dizzy    chest pain    difficulty breathing, wheezing    edema, swelling    fever    irregular heart rate, blood pressure    pain or difficulty passing urine    seizures    unusually weak or tired    white or red patches in mouth  Side effects that usually do not require medical attention (report to your doctor or health care professional if they continue or are bothersome):    constipation or diarrhea    difficulty sleeping    headache    nausea, vomiting    pain, swelling or irritation where injected    stomach upset    vaginal itch, irritation  This list may not describe all possible side effects. Call your doctor for medical advice about side effects. You may report side effects to FDA at 8-593-VSF-4138.  Where should I keep my medicine?  Keep out of the reach of children.  You will be instructed on how to store this medicine. Throw away any unused medicine after the expiration date on the label.  NOTE:This sheet is a summary. It may not cover all possible information. If you have questions about this medicine, talk to your doctor, pharmacist, or health care provider. Copyright  2016 Gold Standard                Follow-ups after your visit        Your next 10 appointments already scheduled     Nov 20, 2017  3:00 PM CST   Infusion 60 with UC SPEC INFUSION, UC 42 ATC   [facility] Specialty and Procedure ([facility])    [address] [place] [address]   [phone]            Nov 21, 2017  3:00 PM CST   Infusion 60 with UC SPEC INFUSION, UC 49 ATC   [facility] Specialty and Procedure ([facility])    [address] [place] [address]   [phone]            Nov 22, 2017  1:00 PM CST   Nurse Visit with  Ent Nurse   [facility] Ear Nose and Throat ([facility])    [address] [place] [address]  [address]   [phone]            Dec 01, 2017  3:40 PM CST   (Arrive by 3:25 PM)   Return Visit with Asiya Morgan MD   [facility] Ear Nose and Throat ([facility])    [address] [place] [address]  4th [facility] [address]   [phone]            Dec 08, 2017 10:00 AM CST   (Arrive by 9:45 AM)   Return Visit with Sergio Noel MD   [facility] and Infectious Diseases ([facility])    [address] [place] [address]  3rd [facility] [address]   [phone]            Dec 11, 2017  2:00 PM CST   (Arrive by 1:45 PM)   Return Visit with Natalie Russell MD   [facility] ([facility])    [address] [place] [address]   [phone]            Dec 13, 2017 10:00 AM CST   US ABDOMEN COMPLETE with UCUS1   [facility] US ([facility])    [address] [place] [address] [phone]           Please bring a list of your medicines (including vitamins, minerals and over-the-counter drugs). Also, tell your doctor about any allergies you may have. Wear comfortable clothes and leave your valuables at home.  Adults: No eating or drinking for 8 hours before the exam. You may take medicine with a small sip of water.  Children: - Children 6+ years: No food or drink for 6 hours before exam. - Children 1-5 years: No food or drink for 4 hours before exam. - Infants, breast-fed: may have breast milk up to 2 hours before exam. - Infants, formula: may have bottle until 4 hours before exam.  Please call the Imaging Department at your exam site with any questions.            Feb 21, 2018 12:00 PM CST   (Arrive by 11:45 AM)   RETURN ENDOCRINE with Jennifer Wilcox MD   [facility] Endocrinology ([facility])    [address] [place] [id number] [phone]             "  Who to contact     If you have questions or need follow up information about today's clinic visit or your schedule please contact [facility] SPECIALTY AND PROCEDURE directly at [phone].  Normal or non-critical lab and imaging results will be communicated to you by SeatSwaprhart, letter or phone within 4 business days after the clinic has received the results. If you do not hear from us within 7 days, please contact the clinic through SeatSwaprhart or phone. If you have a critical or abnormal lab result, we will notify you by phone as soon as possible.  Submit refill requests through Monthlys or call your pharmacy and they will forward the refill request to us. Please allow 3 business days for your refill to be completed.          Additional Information About Your Visit        SeatSwaprhart Information     Monthlys gives you secure access to your electronic health record. If you see a primary care provider, you can also send messages to your care team and make appointments. If you have questions, please call your primary care clinic.  If you do not have a primary care provider, please call [phone] and they will assist you.        Care EveryWhere ID     This is your Care EveryWhere ID. This could be used by other organizations to access your [facility] medical records  [id number]        Your Vitals Were     Pulse Temperature Respirations Height Pulse Oximetry       72 96.4  F (35.8  C) (Tympanic) 16 1.753 m (5' 9.02\") 99%        Blood Pressure from Last 3 Encounters:   11/17/17 115/68   11/19/17 115/55   11/18/17 123/58    Weight from Last 3 Encounters:   11/17/17 85.3 kg (188 lb)   11/17/17 84.8 kg (187 lb)   11/16/17 85.3 kg (188 lb)              Today, you had the following     No orders found for display         Today's Medication Changes      Notice     This visit is during an admission. Changes to the med list made in this visit will be reflected in the After Visit Summary of the admission.       "       Primary Care Provider Office Phone # Fax #    Natalie Mitzi Russell [phone]       [address] [place] [address] 741  [facility] [address]        Equal Access to Services     MINDI RODRIGUEZ : Hadii aad ku hadarico Socassidyali, waaxda luqadaha, qaybta kaalmada adeegyada, emy silvajavon dominguezjasson greenisabella vuong. So [facility] [phone].    ATENCIÓN: Si habla español, tiene a hanley disposición servicios gratuitos de asistencia lingüística. Llame al [phone].    We comply with applicable federal civil rights laws and [place] laws. We do not discriminate on the basis of race, color, national origin, age, disability, sex, sexual orientation, or gender identity.            Thank you!     Thank you for choosing [facility] SPECIAL[facility] PROCEDURE  for your care. Our goal is always to provide you with excellent care. Hearing back from our patients is one way we can continue to improve our services. Please take a few minutes to complete the written survey that you may receive in the mail after your visit with us. Thank you!             Your Updated Medication List - Protect others around you: Learn how to safely use, store and throw away your medicines at www.disposemymeds.org.      Notice     This visit is during an admission. Changes to the med list made in this visit will be reflected in the After Visit Summary of the admission.

## 2017-11-19 NOTE — PATIENT INSTRUCTIONS
Dear Frandy Workman    Thank you for choosing [facility] ([facility]) for your infusion.     We look forward in seeing you on your next appointment here at [facility].  Please don t hesitate to call us at [phone] to reschedule any of your appointments or to speak with one of the [facility] registered nurses.  It was a pleasure taking care of you today.    Sincerely,    [facility]  [facility]  [address] [place] [address]  [address]  Phone:  [phone]    Ertapenem Sodium Solution for injection  What is this medicine?  ERTAPENEM (er ta PEN em) is a carbapenem antibiotic. It is used to treat certain kinds of bacterial infections. It will not work for colds, flu, or other viral infections.  This medicine may be used for other purposes; ask your health care provider or pharmacist if you have questions.  What should I tell my health care provider before I take this medicine?  They need to know if you have any of these conditions:    brain tumor, lesion    kidney disease    seizure disorder    an unusual or allergic reaction to ertapenem, other antibiotics, amide local anesthetics like lidocaine, or other medicines, foods, dyes, or preservatives    pregnant or trying to get pregnant    breast-feeding  How should I use this medicine?  This medicine is infused into a vein or injected deep into a muscle. It is usually given by a health care professional in a hospital or clinic setting.  If you get this medicine at home, you will be taught how to prepare and give this medicine. Use exactly as directed. Take your medicine at regular intervals. Do not take your medicine more often than directed.  It is important that you put your used needles and syringes in a special sharps container. Do not put them in a trash can. If you do not have a sharps container, call your pharmacist or healthcare provider to get  one.  Talk to your pediatrician regarding the use of this medicine in children. While this drug may be prescribed for children as young as 3 months old for selected conditions, precautions do apply.  Overdosage: If you think you have taken too much of this medicine contact a poison control center or emergency room at once.  NOTE: This medicine is only for you. Do not share this medicine with others.  What if I miss a dose?  If you miss a dose, take it as soon as you can. If it is almost time for your next dose, take only that dose. Do not take double or extra doses.  What may interact with this medicine?    birth control pills    probenecid  This list may not describe all possible interactions. Give your health care provider a list of all the medicines, herbs, non-prescription drugs, or dietary supplements you use. Also tell them if you smoke, drink alcohol, or use illegal drugs. Some items may interact with your medicine.  What should I watch for while using this medicine?  Tell your doctor or health care professional if your symptoms do not improve or if you get new symptoms. Your doctor will monitor your condition and blood work as needed.  Do not treat diarrhea with over the counter products. Contact your doctor if you have diarrhea that lasts more than 2 days or if it is severe and watery.  What side effects may I notice from receiving this medicine?  Side effects that you should report to your doctor or health care professional as soon as possible:    allergic reactions like skin rash, itching or hives, swelling of the face, lips, or tongue    anxiety, confusion, dizzy    chest pain    difficulty breathing, wheezing    edema, swelling    fever    irregular heart rate, blood pressure    pain or difficulty passing urine    seizures    unusually weak or tired    white or red patches in mouth  Side effects that usually do not require medical attention (report to your doctor or health care professional if they  continue or are bothersome):    constipation or diarrhea    difficulty sleeping    headache    nausea, vomiting    pain, swelling or irritation where injected    stomach upset    vaginal itch, irritation  This list may not describe all possible side effects. Call your doctor for medical advice about side effects. You may report side effects to FDA at 2-231-FDA-3733.  Where should I keep my medicine?  Keep out of the reach of children.  You will be instructed on how to store this medicine. Throw away any unused medicine after the expiration date on the label.  NOTE:This sheet is a summary. It may not cover all possible information. If you have questions about this medicine, talk to your doctor, pharmacist, or health care provider. Copyright  2016 Gold Standard

## 2017-11-20 ENCOUNTER — INFUSION THERAPY VISIT (OUTPATIENT)
Dept: INFUSION THERAPY | Facility: CLINIC | Age: 52
End: 2017-11-20
Attending: PHYSICIAN ASSISTANT
Payer: MEDICARE

## 2017-11-20 VITALS — DIASTOLIC BLOOD PRESSURE: 48 MMHG | HEART RATE: 74 BPM | SYSTOLIC BLOOD PRESSURE: 104 MMHG | RESPIRATION RATE: 16 BRPM

## 2017-11-20 DIAGNOSIS — K11.3 PAROTID ABSCESS: Primary | ICD-10-CM

## 2017-11-20 PROCEDURE — 96374 THER/PROPH/DIAG INJ IV PUSH: CPT

## 2017-11-20 PROCEDURE — 25000128 H RX IP 250 OP 636: Mod: ZF | Performed by: PHYSICIAN ASSISTANT

## 2017-11-20 PROCEDURE — 27210995 ZZH RX 272: Mod: ZF | Performed by: PHYSICIAN ASSISTANT

## 2017-11-20 RX ADMIN — WATER 1 G: 1 INJECTION INTRAMUSCULAR; INTRAVENOUS; SUBCUTANEOUS at 15:09

## 2017-11-20 NOTE — PATIENT INSTRUCTIONS
Dear Frandy Workman    Thank you for choosing [facility] ([facility]) for your infusion.  The following information is a summary of our appointment as well as important reminders.          We look forward in seeing you on your next appointment here at [facility].  Please don t hesitate to call us at [phone] to reschedule any of your appointments or to speak with one of the [facility] registered nurses.  It was a pleasure taking care of you today.    Sincerely,    [facility]  [facility]  [address] [place] [address]  [address]  Phone:  [phone]

## 2017-11-20 NOTE — PROGRESS NOTES
Nursing Note  Frandy Workman [facility] for:   Chief Complaint   Patient presents with     Infusion     Invanz infusion      During [facility]'s [facility] appointment, orders from Kera Dillon PA-C were completed.  Frequency: Today is dose 14 and final dose, clarified with pharmacy.     Progress note:  Patient identification verified by name and date of birth.  Assessment completed.  Vitals recorded in Doc Flowsheets.  Patient was provided with education regarding infusion and possible side effects.  Patient verbalized understanding.      needed: No  Premedications: were not ordered.  Infusion Rates: Invanz administered over 7 minutes today.   Approximate Infusion length:7 minutes.   Labs: were not ordered for this appointment.  Vascular access: PICC accessed today.  Treatment Conditions: non-applicable.  Patient tolerated infusion: well.    Message sent to provider to obtain order for PICC removal.        Discharge Plan:   [facility]   Discharge instructions were reviewed with patient.  Patient/representative verbalized understanding of discharge instructions and all questions answered.  [facility] in stable condition.    Bing Reddy RN    Administrations This Visit     ertapenem (INVanz) 1 g in 10 mL SWFI for IVP     Admin Date Action Dose Route Administered By             11/20/2017 Given 1 g Intravenous Bing Reddy RN                          /48  Pulse 74  Resp 16

## 2017-11-20 NOTE — MR AVS SNAPSHOT
After Visit Summary   11/20/2017    Frandy Workman    MRN: [id number]           Patient Information     Date Of Birth          1965        Visit Information        Provider Department      11/20/2017 3:00 PM UC 42 ATC; UC SPEC INFUSION [facility]        Today's Diagnoses     Parotid abscess    -  1      Care Instructions    Dear Frandy Workman    Thank you for choosing [facility] ([facility]) for your infusion.  The following information is a summary of our appointment as well as important reminders.          We look forward in seeing you on your next appointment here at [facility].  Please don t hesitate to call us at [phone] to reschedule any of your appointments or to speak with one of the [facility] registered nurses.  It was a pleasure taking care of you today.    Sincerely,    [facility]  Specialty [facility]fusion [facility] Center  [address] [place] [address]  [address]  Phone:  [phone]            Follow-ups after your visit        Your next 10 appointments already scheduled     Nov 21, 2017  3:00 PM CST   Infusion 60 with UC SPEC INFUSION, UC 49 ATC   [facility] Special[facility] Procedure ([facility])    [address] [place] [address] [id number] [phone]            Nov 22, 2017  1:00 PM CST   Nurse Visit with Rocco Ent Nurse   [facility] ([facility])    [address] [place] [id number] [phone]            Dec 01, 2017  3:40 PM CST   (Arrive by 3:25 PM)   Return Visit with Asiya Morgan MD   [facility] ([facility])    [address] [place] [address] [id number] [phone]            Dec 08, 2017 10:00 AM CST   (Arrive by 9:45 AM)   Return Visit with Sergio  Mee Noel MD   [facility] ([facility])    [address] [place] [address]  [address] [facility] [address]   [phone]            Dec 11, 2017  2:00 PM CST   (Arrive by 1:45 PM)   Return Visit with Natalie Russell MD   [facility] ([facility])    [address] [place] [address]  [address] [facility] [address]   [phone]            Dec 13, 2017 10:00 AM CST   US ABDOMEN COMPLETE with UCUS1   [facility] ([facility])    [address] [place] [address]  [address] [facility] [id number] [phone]           Please bring a list of your medicines (including vitamins, minerals and over-the-counter drugs). Also, tell your doctor about any allergies you may have. Wear comfortable clothes and leave your valuables at home.  Adults: No eating or drinking for 8 hours before the exam. You may take medicine with a small sip of water.  Children: - Children 6+ years: No food or drink for 6 hours before exam. - Children 1-5 years: No food or drink for 4 hours before exam. - Infants, breast-fed: may have breast milk up to 2 hours before exam. - Infants, formula: may have bottle until 4 hours before exam.  Please call the Imaging Department at your exam site with any questions.            Feb 21, 2018 12:00 PM CST   (Arrive by 11:45 AM)   RETURN ENDOCRINE with Jennifer Wilcox MD   [facility] ([facility])    [address] [place] [address]   [phone]            Mar 08, 2018 12:30 PM CST   (Arrive by 12:15 PM)   Return Visit with Lamin Gonzalez DPM   [facility] ([facility])    [address] [place] [address]   [phone]            Apr 16, 2018 12:00 PM CDT   Lab with  LAB   [facility] ([facility])    [address] [place] [address]  [address]  [address] [facility] [address] [phone]              Who to contact     If you have questions or need follow up information about today's clinic visit or your schedule please contact [facility] directly at [phone].  Normal or non-critical lab and imaging results will be communicated to you by MyChart, letter or phone within 4 business days after the clinic has received the results. If you do not hear from us within 7 days, please contact the clinic through "[employer]hart or phone. If you have a critical or abnormal lab result, we will notify you by phone as soon as possible.  Submit refill requests through [email or web address] or call your pharmacy and they will forward the refill request to us. Please allow 3 business days for your refill to be completed.          Additional Information About Your Visit        "[employer] Information     [email or web address] gives you secure access to your electronic health record. If you see a primary care provider, you can also send messages to your care team and make appointments. If you have questions, please call your primary care clinic.  If you do not have a primary care provider, please call [phone] and they will assist you.        Care EveryWhere ID     This is your Care EveryWhere ID. This could be used by other organizations to access your [facility] medical records  [id number]        Your Vitals Were     Pulse Respirations                74 16           Blood Pressure from Last 3 Encounters:   11/20/17 104/48   11/19/17 115/55   11/18/17 123/58    Weight from Last 3 Encounters:   11/17/17 84.8 kg (187 lb)   11/17/17 85.3 kg (188 lb)   11/16/17 85.3 kg (188 lb)              Today, you had the following     No orders found for display         Today's Medication Changes          These changes are accurate as of: 11/20/17  3:40 PM.  If you have any questions, ask your nurse or doctor.               These medicines have changed or  have updated prescriptions.        Dose/Directions    dapagliflozin 10 MG Tabs tablet   Commonly known as:  FARXIGA   This may have changed:  when to take this   Used for:  Type 2 diabetes mellitus with hyperglycemia, with long-term current use of insulin (H)        Dose:  10 mg   Take 1 tablet (10 mg) by mouth daily   Quantity:  90 tablet   Refills:  3       insulin degludec 200 UNIT/ML pen   Commonly known as:  TRESIBA   This may have changed:    - how much to take  - how to take this  - when to take this  - additional instructions   Used for:  Type 2 diabetes mellitus with hyperglycemia, with long-term current use of insulin (H)        Take 120 units daily.   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 (H)        Dose:  30 g   Take 45 mLs (30 g) by mouth 4 times daily   Quantity:  7200 mL   Refills:  11       omeprazole 40 MG capsule   Commonly known as:  priLOSEC   This may have changed:  when to take this   Used for:  Gastroesophageal reflux disease, esophagitis presence not specified        Dose:  40 mg   Take 1 capsule (40 mg) by mouth daily   Quantity:  90 capsule   Refills:  2       potassium chloride SA 20 MEQ CR tablet   Commonly known as:  K-DUR/KLOR-CON M   This may have changed:  when to take this   Used for:  Hypokalemia        Dose:  20 mEq   Take 1 tablet (20 mEq) by mouth daily   Quantity:  90 tablet   Refills:  3       pravastatin 10 MG tablet   Commonly known as:  PRAVACHOL   This may have changed:    - how much to take  - when to take this   Used for:  Hyperlipidemia LDL goal <100        Dose:  20 mg   Take 2 tablets (20 mg) by mouth daily   Quantity:  90 tablet   Refills:  3       spironolactone 25 MG tablet   Commonly known as:  ALDACTONE   This may have changed:  when to take this   Used for:  Other ascites        Dose:   25 mg   Take 1 tablet (25 mg) by mouth daily   Quantity:  90 tablet   Refills:  1                Primary Care Provider Office Phone # Fax #    Natalie Mitzi Andrés Russell [phone]       [address] [place] [address]  [facility] [address]        Equal Access to Services     MINDI RODRIGUEZ : Hadii aad ku hadasho Soomaali, waaxda luqadaha, qaybta kaalmada adeegyada, waxay lincoln ricardon osbaldo bolanosmelodyisabella vuong. So [facility] [phone].    ATENCIÓN: Si habla español, tiene a hanley disposición servicios gratuitos de asistencia lingüística. [facility] [phone].    We comply with applicable federal civil rights laws and [place] laws. We do not discriminate on the basis of race, color, national origin, age, disability, sex, sexual orientation, or gender identity.            Thank you!     Thank you for choosing [facility] SPECIAL[facility] PROCEDURE  for your care. Our goal is always to provide you with excellent care. Hearing back from our patients is one way we can continue to improve our services. Please take a few minutes to complete the written survey that you may receive in the mail after your visit with us. Thank you!             Your Updated Medication List - Protect others around you: Learn how to safely use, store and throw away your medicines at www.disposemymeds.org.          This list is accurate as of: 11/20/17  3:40 PM.  Always use your most recent med list.                   Brand Name Dispense Instructions for use Diagnosis    ARTIFICIAL TEARS Oint     3.5 g    Apply 1 Application to eye At Bedtime    Post-operative state       blood glucose monitoring lancets     408 each    Use to test blood sugar 4 times daily or as directed.  1 box = 102 lancets    Type II diabetes mellitus (H)       blood glucose monitoring test strip    ACCU-CHEK EDINSON PLUS    400 each    Use to test blood sugar 4 times daily    Type II diabetes mellitus (H)       carvedilol 12.5 MG tablet    COREG     180 tablet    Take 1 tablet (12.5 mg) by mouth 2 times daily (with meals)    Liver cirrhosis secondary to ESTRADA (H), Secondary esophageal varices without bleeding (H)       dapagliflozin 10 MG Tabs tablet    FARXIGA    90 tablet    Take 1 tablet (10 mg) by mouth daily    Type 2 diabetes mellitus with hyperglycemia, with long-term current use of insulin (H)       ertapenem 1 GM vial    INVanz    10 each    Inject 1 g into the vein every 24 hours for 14 days    Parotid abscess       erythromycin ophthalmic ointment    ROMYCIN    3.5 g    Place 1 Application into the right eye 3 times daily    Post-operative state       hypromellose-dextran Soln ophthalmic solution     1 Bottle    Place 1 drop into the right eye every hour as needed for dry eyes Apply at least 4 times daily and as needed for dry eye    Dry eyes       insulin aspart 100 UNIT/ML injection    NovoLOG FLEXPEN    24 mL    1 unit per 4 Gms CHO at meals and snacks + correction scale of 1 unit per 25 mg/dL over 125. Average daily dose is 75 units.    Type II diabetes mellitus (H)       insulin degludec 200 UNIT/ML pen    TRESIBA    36 mL    Take 120 units daily.    Type 2 diabetes mellitus with hyperglycemia, with long-term current use of insulin (H)       insulin pen needle 32G X 4 MM    BD VIKTORIA U/F    100 each    Use as directed.    Type II diabetes mellitus (H)       lactobacillus rhamnosus (GG) capsule     28 capsule    Take 1 capsule by mouth 2 times daily for 14 days    Long term (current) use of antibiotics       lactulose 10 GM/15ML solution    CHRONULAC    7200 mL    Take 45 mLs (30 g) by mouth 4 times daily    Liver cirrhosis secondary to ESTRADA (H)       ofloxacin 0.3 % otic solution    FLOXIN    5 mL    Place 5 drops into the right ear 2 times daily for 10 days    Right ear pain       OLANZapine 2.5 MG tablet    zyPREXA    30 tablet    Take 1 tablet (2.5 mg) by mouth At Bedtime    Insomnia, unspecified type       omeprazole 40 MG capsule    priLOSEC     90 capsule    Take 1 capsule (40 mg) by mouth daily    Gastroesophageal reflux disease, esophagitis presence not specified       oxyCODONE IR 5 MG tablet    ROXICODONE    15 tablet    Take 1-2 tablets (5-10 mg) by mouth every 3 hours as needed for pain or other (Moderate to Severe)    Acute postoperative pain       potassium chloride SA 20 MEQ CR tablet    K-DUR/KLOR-CON M    90 tablet    Take 1 tablet (20 mEq) by mouth daily    Hypokalemia       pravastatin 10 MG tablet    PRAVACHOL    90 tablet    Take 2 tablets (20 mg) by mouth daily    Hyperlipidemia LDL goal <100       Propylene Glycol-Glycerin 1-0.3 % Soln    ARTIFICIAL TEARS    30 mL    Apply 4 drops to eye every 2 hours (while awake)    Post-operative state       RA VITAMIN B-12 TR 1000 MCG Tbcr   Generic drug:  cyanocobalamin      Take 1,000 mcg by mouth every morning        rifaximin 550 MG Tabs tablet    XIFAXAN    60 tablet    Take 1 tablet (550 mg) by mouth 2 times daily    Hepatic encephalopathy (H)       spironolactone 25 MG tablet    ALDACTONE    90 tablet    Take 1 tablet (25 mg) by mouth daily    Other ascites       THERAVITE PO      Take 1 tablet by mouth every morning        VITAMIN D-3 PO      Take 2,000 Units by mouth every morning

## 2017-11-21 ENCOUNTER — INFUSION THERAPY VISIT (OUTPATIENT)
Dept: INFUSION THERAPY | Facility: CLINIC | Age: 52
End: 2017-11-21
Attending: PHYSICIAN ASSISTANT
Payer: MEDICARE

## 2017-11-21 DIAGNOSIS — K11.3 PAROTID ABSCESS: Primary | ICD-10-CM

## 2017-11-21 NOTE — NURSING NOTE
Per Dr Noel via face to face conversation in clinic, OK to put in order to pull PICC.  Trish Franco RN

## 2017-11-21 NOTE — MR AVS SNAPSHOT
After Visit Summary   11/21/2017    Frandy Workman    MRN: [id number]           Patient Information     Date Of Birth          1965        Visit Information        Provider Department      11/21/2017 3:00 PM UC 49 ATC; UC SPEC INFUSION [facility] [place] [facility]        Today's Diagnoses     Parotid abscess    -  1       Follow-ups after your visit        Your next 10 appointments already scheduled     Dec 01, 2017  3:40 PM CST   (Arrive by 3:25 PM)   Return Visit with Asiya Morgan MD   [facility] ([facility])    [address] [place] [address]  4th [facility] [address]   [phone]            Dec 08, 2017 10:00 AM CST   (Arrive by 9:45 AM)   Return Visit with Sergio Noel MD   [facility] ([facility])    [address] [place] [address]  3rd Floor  [facility] [address]   [phone]            Dec 11, 2017  2:00 PM CST   (Arrive by 1:45 PM)   Return Visit with Natalie Russell MD   [facility] ([facility])    [address] [place] [address]  4th [facility] [address]   [phone]            Dec 13, 2017 10:00 AM CST   US ABDOMEN COMPLETE with UCUS1   [facility] US ([facility])    [address] [place] [address]  1st [facility] [address] [phone]           Please bring a list of your medicines (including vitamins, minerals and over-the-counter drugs). Also, tell your doctor about any allergies you may have. Wear comfortable clothes and leave your valuables at home.  Adults: No eating or drinking for 8 hours before the exam. You may take medicine with a small sip of water.  Children: - Children 6+ years: No food or drink for 6 hours before exam. - Children 1-5 years: No food or drink for 4 hours before exam. - Infants, breast-fed: may have breast milk up to 2  hours before exam. - Infants, formula: may have bottle until 4 hours before exam.  Please call the Imaging Department at your exam site with any questions.            Jan 24, 2018  2:00 PM CST   (Arrive by 1:45 PM)   Return Visit with Milana Malave MD   [facility] ([place] [facility])    [address] [place] [address]  4th [facility] [address] [phone]            Feb 21, 2018 12:00 PM CST   (Arrive by 11:45 AM)   RETURN ENDOCRINE with Jennifer Wilcox MD   [facility] Endocr[facility] ([place] [facility])    [address] [place] [address]  3rd [facility] [id number] [phone]            Mar 08, 2018 12:30 PM CST   (Arrive by 12:15 PM)   Return Visit with Lamin Gonzalez DPM   [facility] ([place] [facility])    [address] [place] [address]  3rd [facility] [id number] [phone]            Apr 16, 2018 12:00 PM CDT   Lab with  LAB   [facility] ([place] [facility])    [address] [place] [address]  1st [facility] [id number] [phone]            Apr 16, 2018  1:00 PM CDT   (Arrive by 12:45 PM)   Return General Liver with Santi Dotson MD   [facility]at[facility] ([place] [facility])    [address] [place] [id number] [phone]              Who to contact     If you have questions or need follow up information about today's clinic visit or your schedule please contact [facility] [place] [facility] directly at [phone].  Normal or non-critical lab and imaging results will be communicated to you by MyChart, letter or phone within 4 business days after the clinic has received the results. If you do not hear from us within 7 days, please contact the clinic through MyChart or phone. If you have a critical or abnormal lab result, we will notify you by phone as soon as possible.  Submit refill requests  through [employer] or call your pharmacy and they will forward the refill request to us. Please allow 3 business days for your refill to be completed.          Additional Information About Your Visit        [employer] Information     [employer] gives you secure access to your electronic health record. If you see a primary care provider, you can also send messages to your care team and make appointments. If you have questions, please call your primary care clinic.  If you do not have a primary care provider, please call [phone] and they will assist you.        Care EveryWhere ID     This is your Care EveryWhere ID. This could be used by other organizations to access your [facility] medical records  [id number]         Blood Pressure from Last 3 Encounters:   11/20/17 104/48   11/19/17 115/55   11/18/17 123/58    Weight from Last 3 Encounters:   11/22/17 89.4 kg (197 lb)   11/17/17 84.8 kg (187 lb)   11/17/17 85.3 kg (188 lb)              Today, you had the following     No orders found for display         Today's Medication Changes          These changes are accurate as of: 11/21/17 11:59 PM.  If you have any questions, ask your nurse or doctor.               These medicines have changed or have updated prescriptions.        Dose/Directions    dapagliflozin 10 MG Tabs tablet   Commonly known as:  FARXIGA   This may have changed:  when to take this   Used for:  Type 2 diabetes mellitus with hyperglycemia, with long-term current use of insulin (H)        Dose:  10 mg   Take 1 tablet (10 mg) by mouth daily   Quantity:  90 tablet   Refills:  3       insulin degludec 200 UNIT/ML pen   Commonly known as:  TRESIBA   This may have changed:    - how much to take  - how to take this  - when to take this  - additional instructions   Used for:  Type 2 diabetes mellitus with hyperglycemia, with long-term current use of insulin (H)        Take 120 units daily.   Quantity:  36 mL   Refills:  3       lactulose 10 GM/15ML solution    Commonly known as:  CHRONULAC   This may have changed:    - how much to take  - additional instructions   Used for:  Liver cirrhosis secondary to ESTRADA (H)        Dose:  30 g   Take 45 mLs (30 g) by mouth 4 times daily   Quantity:  7200 mL   Refills:  11       omeprazole 40 MG capsule   Commonly known as:  priLOSEC   This may have changed:  when to take this   Used for:  Gastroesophageal reflux disease, esophagitis presence not specified        Dose:  40 mg   Take 1 capsule (40 mg) by mouth daily   Quantity:  90 capsule   Refills:  2       potassium chloride SA 20 MEQ CR tablet   Commonly known as:  K-DUR/KLOR-CON M   This may have changed:  when to take this   Used for:  Hypokalemia        Dose:  20 mEq   Take 1 tablet (20 mEq) by mouth daily   Quantity:  90 tablet   Refills:  3       pravastatin 10 MG tablet   Commonly known as:  PRAVACHOL   This may have changed:    - how much to take  - when to take this   Used for:  Hyperlipidemia LDL goal <100        Dose:  20 mg   Take 2 tablets (20 mg) by mouth daily   Quantity:  90 tablet   Refills:  3       spironolactone 25 MG tablet   Commonly known as:  ALDACTONE   This may have changed:  when to take this   Used for:  Other ascites        Dose:  25 mg   Take 1 tablet (25 mg) by mouth daily   Quantity:  90 tablet   Refills:  1                Primary Care Provider Office Phone # Fax #    Natalie Mitzi Andrés Russell [phone]       [address] [place] [address] [address] [place] [id number]        Equal Access to Services     MINDI RODRIGUEZ AH: Hadii curly huntero Somichael, waaxda luqadaha, qaybta kaalmada aderadhada, emy mcdonald . So [facility] [phone].    ATENCIÓN: Si habla español, tiene a hanley disposición servicios gratuitos de asistencia lingüística. Llame al [phone].    We comply with applicable federal civil rights laws and [place] laws. We do not discriminate on the basis of race, color, national origin, age, disability, sex,  sexual orientation, or gender identity.            Thank you!     Thank you for choosing [facility]  for your care. Our goal is always to provide you with excellent care. Hearing back from our patients is one way we can continue to improve our services. Please take a few minutes to complete the written survey that you may receive in the mail after your visit with us. Thank you!             Your Updated Medication List - Protect others around you: Learn how to safely use, store and throw away your medicines at www.disposemymeds.org.          This list is accurate as of: 11/21/17 11:59 PM.  Always use your most recent med list.                   Brand Name Dispense Instructions for use Diagnosis    ARTIFICIAL TEARS Oint     3.5 g    Apply 1 Application to eye At Bedtime    Post-operative state       blood glucose monitoring lancets     408 each    Use to test blood sugar 4 times daily or as directed.  1 box = 102 lancets    Type II diabetes mellitus (H)       blood glucose monitoring test strip    ACCU-CHEK EDINSON PLUS    400 each    Use to test blood sugar 4 times daily    Type II diabetes mellitus (H)       carvedilol 12.5 MG tablet    COREG    180 tablet    Take 1 tablet (12.5 mg) by mouth 2 times daily (with meals)    Liver cirrhosis secondary to ESTRADA (H), Secondary esophageal varices without bleeding (H)       dapagliflozin 10 MG Tabs tablet    FARXIGA    90 tablet    Take 1 tablet (10 mg) by mouth daily    Type 2 diabetes mellitus with hyperglycemia, with long-term current use of insulin (H)       erythromycin ophthalmic ointment    ROMYCIN    3.5 g    Place 1 Application into the right eye 3 times daily    Post-operative state       hypromellose-dextran Soln ophthalmic solution     1 Bottle    Place 1 drop into the right eye every hour as needed for dry eyes Apply at least 4 times daily and as needed for dry eye    Dry eyes       insulin aspart 100 UNIT/ML  injection    NovoLOG FLEXPEN    24 mL    1 unit per 4 Gms CHO at meals and snacks + correction scale of 1 unit per 25 mg/dL over 125. Average daily dose is 75 units.    Type II diabetes mellitus (H)       insulin degludec 200 UNIT/ML pen    TRESIBA    36 mL    Take 120 units daily.    Type 2 diabetes mellitus with hyperglycemia, with long-term current use of insulin (H)       insulin pen needle 32G X 4 MM    BD VIKTORIA U/F    100 each    Use as directed.    Type II diabetes mellitus (H)       lactulose 10 GM/15ML solution    CHRONULAC    7200 mL    Take 45 mLs (30 g) by mouth 4 times daily    Liver cirrhosis secondary to ESTRADA (H)       OLANZapine 2.5 MG tablet    zyPREXA    30 tablet    Take 1 tablet (2.5 mg) by mouth At Bedtime    Insomnia, unspecified type       omeprazole 40 MG capsule    priLOSEC    90 capsule    Take 1 capsule (40 mg) by mouth daily    Gastroesophageal reflux disease, esophagitis presence not specified       oxyCODONE IR 5 MG tablet    ROXICODONE    15 tablet    Take 1-2 tablets (5-10 mg) by mouth every 3 hours as needed for pain or other (Moderate to Severe)    Acute postoperative pain       potassium chloride SA 20 MEQ CR tablet    K-DUR/KLOR-CON M    90 tablet    Take 1 tablet (20 mEq) by mouth daily    Hypokalemia       pravastatin 10 MG tablet    PRAVACHOL    90 tablet    Take 2 tablets (20 mg) by mouth daily    Hyperlipidemia LDL goal <100       Propylene Glycol-Glycerin 1-0.3 % Soln    ARTIFICIAL TEARS    30 mL    Apply 4 drops to eye every 2 hours (while awake)    Post-operative state       RA VITAMIN B-12 TR 1000 MCG Tbcr   Generic drug:  cyanocobalamin      Take 1,000 mcg by mouth every morning        rifaximin 550 MG Tabs tablet    XIFAXAN    60 tablet    Take 1 tablet (550 mg) by mouth 2 times daily    Hepatic encephalopathy (H)       spironolactone 25 MG tablet    ALDACTONE    90 tablet    Take 1 tablet (25 mg) by mouth daily    Other ascites       THERAVITE PO      Take 1 tablet by  mouth every morning        VITAMIN D-3 PO      Take 2,000 Units by mouth every morning

## 2017-11-22 ENCOUNTER — OFFICE VISIT (OUTPATIENT)
Dept: OTOLARYNGOLOGY | Facility: CLINIC | Age: 52
End: 2017-11-22

## 2017-11-22 VITALS — WEIGHT: 197 LBS | BODY MASS INDEX: 30.92 KG/M2 | HEIGHT: 67 IN

## 2017-11-22 DIAGNOSIS — Z98.890 POSTOPERATIVE STATE: Primary | ICD-10-CM

## 2017-11-22 ASSESSMENT — PAIN SCALES - GENERAL: PAINLEVEL: SEVERE PAIN (6)

## 2017-11-22 NOTE — MR AVS SNAPSHOT
After Visit Summary   11/22/2017    Frandy Workman    MRN: [id number]           Patient Information     Date Of Birth          1965        Visit Information        Provider Department      11/22/2017 1:20 PM Milana Malave MD [facility] Ear Nose and Throat         Follow-ups after your visit        Your next 10 appointments already scheduled     Dec 01, 2017  3:40 PM CST   (Arrive by 3:25 PM)   Return Visit with Asiya Morgan MD   [facility] Ear Nose and Throat ([facility])    [address] [place] [address]  4th [facility] [address]   [phone]            Dec 08, 2017 10:00 AM CST   (Arrive by 9:45 AM)   Return Visit with Sergio Noel MD   [facility] and Infectious Diseases ([facility])    [address] [place] [address]  3rd [facility] [address]   [phone]            Dec 11, 2017  2:00 PM CST   (Arrive by 1:45 PM)   Return Visit with Natalie Russell MD   [facility] Primary Care Clinic ([facility])    [address] [place] [address]  4th [facility] [address]   [phone]            Dec 13, 2017 10:00 AM CST   US ABDOMEN COMPLETE with UCUS1   [facility] Imaging Center US ([facility])    [address] [place] [address]  1st [facility] [address]   [phone]           Please bring a list of your medicines (including vitamins, minerals and over-the-counter drugs). Also, tell your doctor about any allergies you may have. Wear comfortable clothes and leave your valuables at home.  Adults: No eating or drinking for 8 hours before the exam. You may take medicine with a small sip of water.  Children: - Children 6+ years: No food or drink for 6 hours before exam. - Children 1-5 years: No food or drink for 4 hours before exam. - Infants, breast-fed: may have breast milk up to 2 hours before exam. - Infants, formula: may have bottle until 4 hours before exam.   Please call the Imaging Department at your exam site with any questions.            Jan 24, 2018  2:00 PM CST   (Arrive by 1:45 PM)   Return Visit with Milana Malave MD   [facility] Ear Nose and Throat ([facility])    [address] [place] [address]  4th [facility] [address] [phone]            Feb 21, 2018 12:00 PM CST   (Arrive by 11:45 AM)   RETURN ENDOCRINE with Jennifer Wilcox MD   [facility] Endocrinology ([facility])    [address] [place] [address]  3rd [facility] [id number] [phone]            Mar 08, 2018 12:30 PM CST   (Arrive by 12:15 PM)   Return Visit with Lamin Gonzalez DPM   [facility] Endocrinology ([facility])    [address] [place] [id number] [phone]            Apr 16, 2018 12:00 PM CDT   Lab with  LAB   [facility] Lab ([facility])    [address] [place] [id number] [phone]            Apr 16, 2018  1:00 PM CDT   (Arrive by 12:45 PM)   Return General Liver with Santi Dotson MD   [facility] Hepatology ([facility])    [address] [place] [id number] [phone]              Who to contact     Please call your clinic at [phone] to:    Ask questions about your health    Make or cancel appointments    Discuss your medicines    Learn about your test results    Speak to your doctor   If you have compliments or concerns about an experience at your clinic, or if you wish to file a complaint, please contact [facility] Patient Relations at [phone] or email us at [email or web address]         Additional Information About Your Visit        MyChart Information     MyChart gives you secure access to your electronic health record. If you see a primary care provider, you can also send messages to your care team  "and make appointments. If you have questions, please call your primary care clinic.  If you do not have a primary care provider, please call [phone] and they will assist you.      [employer] is an electronic gateway that provides easy, online access to your medical records. With [employer], you can request a clinic appointment, read your test results, renew a prescription or communicate with your care team.     To access your existing account, please contact your [facility] or call [phone] for assistance.        Care EveryWhere ID     This is your Care EveryWhere ID. This could be used by other organizations to access your [place] medical records  [id number]        Your Vitals Were     Height BMI (Body Mass Index)                1.71 m (5' 7.32\") 30.56 kg/m2           Blood Pressure from Last 3 Encounters:   11/20/17 104/48   11/19/17 115/55   11/18/17 123/58    Weight from Last 3 Encounters:   11/22/17 89.4 kg (197 lb)   11/17/17 84.8 kg (187 lb)   11/17/17 85.3 kg (188 lb)              Today, you had the following     No orders found for display         Today's Medication Changes          These changes are accurate as of: 11/22/17  1:31 PM.  If you have any questions, ask your nurse or doctor.               These medicines have changed or have updated prescriptions.        Dose/Directions    dapagliflozin 10 MG Tabs tablet   Commonly known as:  FARXIGA   This may have changed:  when to take this   Used for:  Type 2 diabetes mellitus with hyperglycemia, with long-term current use of insulin (H)        Dose:  10 mg   Take 1 tablet (10 mg) by mouth daily   Quantity:  90 tablet   Refills:  3       insulin degludec 200 UNIT/ML pen   Commonly known as:  TRESIBA   This may have changed:    - how much to take  - how to take this  - when to take this  - additional instructions   Used for:  Type 2 diabetes mellitus with hyperglycemia, with long-term current use of insulin (H)        " Take 120 units daily.   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 (H)        Dose:  30 g   Take 45 mLs (30 g) by mouth 4 times daily   Quantity:  7200 mL   Refills:  11       omeprazole 40 MG capsule   Commonly known as:  priLOSEC   This may have changed:  when to take this   Used for:  Gastroesophageal reflux disease, esophagitis presence not specified        Dose:  40 mg   Take 1 capsule (40 mg) by mouth daily   Quantity:  90 capsule   Refills:  2       potassium chloride SA 20 MEQ CR tablet   Commonly known as:  K-DUR/KLOR-CON M   This may have changed:  when to take this   Used for:  Hypokalemia        Dose:  20 mEq   Take 1 tablet (20 mEq) by mouth daily   Quantity:  90 tablet   Refills:  3       pravastatin 10 MG tablet   Commonly known as:  PRAVACHOL   This may have changed:    - how much to take  - when to take this   Used for:  Hyperlipidemia LDL goal <100        Dose:  20 mg   Take 2 tablets (20 mg) by mouth daily   Quantity:  90 tablet   Refills:  3       spironolactone 25 MG tablet   Commonly known as:  ALDACTONE   This may have changed:  when to take this   Used for:  Other ascites        Dose:  25 mg   Take 1 tablet (25 mg) by mouth daily   Quantity:  90 tablet   Refills:  1                Primary Care Provider Office Phone # Fax #    Natalie Mitzi Andrés Russell [phone]       [facility] [address] [place] [address]        Equal Access to Services     MINDI RODRIGUEZ AH: Hadii curly patel Somichael, waaxda luqadaha, qaybta kaalmada adejassonyada, waxay lincoln vuong. So [facility] [phone].    ATENCIÓN: Si habla español, tiene a hanley disposición servicios gratuitos de asistencia lingüística. Llame al [phone].    We comply with applicable federal civil rights laws and [place] laws.  We do not discriminate on the basis of race, color, national origin, age, disability, sex, sexual orientation, or gender identity.            Thank you!     Thank you for choosing [facility] EAR NOSE AND THROAT  for your care. Our goal is always to provide you with excellent care. Hearing back from our patients is one way we can continue to improve our services. Please take a few minutes to complete the written survey that you may receive in the mail after your visit with us. Thank you!             Your Updated Medication List - Protect others around you: Learn how to safely use, store and throw away your medicines at www.disposemymeds.org.          This list is accurate as of: 11/22/17  1:31 PM.  Always use your most recent med list.                   Brand Name Dispense Instructions for use Diagnosis    ARTIFICIAL TEARS Oint     3.5 g    Apply 1 Application to eye At Bedtime    Post-operative state       blood glucose monitoring lancets     408 each    Use to test blood sugar 4 times daily or as directed.  1 box = 102 lancets    Type II diabetes mellitus (H)       blood glucose monitoring test strip    ACCU-CHEK EDINSON PLUS    400 each    Use to test blood sugar 4 times daily    Type II diabetes mellitus (H)       carvedilol 12.5 MG tablet    COREG    180 tablet    Take 1 tablet (12.5 mg) by mouth 2 times daily (with meals)    Liver cirrhosis secondary to ESTRADA (H), Secondary esophageal varices without bleeding (H)       dapagliflozin 10 MG Tabs tablet    FARXIGA    90 tablet    Take 1 tablet (10 mg) by mouth daily    Type 2 diabetes mellitus with hyperglycemia, with long-term current use of insulin (H)       erythromycin ophthalmic ointment    ROMYCIN    3.5 g    Place 1 Application into the right eye 3 times daily    Post-operative state       hypromellose-dextran Soln ophthalmic solution     1 Bottle    Place 1 drop into the right eye every hour as needed for dry eyes Apply at least 4 times daily and as needed for  dry eye    Dry eyes       insulin aspart 100 UNIT/ML injection    NovoLOG FLEXPEN    24 mL    1 unit per 4 Gms CHO at meals and snacks + correction scale of 1 unit per 25 mg/dL over 125. Average daily dose is 75 units.    Type II diabetes mellitus (H)       insulin degludec 200 UNIT/ML pen    TRESIBA    36 mL    Take 120 units daily.    Type 2 diabetes mellitus with hyperglycemia, with long-term current use of insulin (H)       insulin pen needle 32G X 4 MM    BD VIKTORIA U/F    100 each    Use as directed.    Type II diabetes mellitus (H)       lactulose 10 GM/15ML solution    CHRONULAC    7200 mL    Take 45 mLs (30 g) by mouth 4 times daily    Liver cirrhosis secondary to ESTRADA (H)       OLANZapine 2.5 MG tablet    zyPREXA    30 tablet    Take 1 tablet (2.5 mg) by mouth At Bedtime    Insomnia, unspecified type       omeprazole 40 MG capsule    priLOSEC    90 capsule    Take 1 capsule (40 mg) by mouth daily    Gastroesophageal reflux disease, esophagitis presence not specified       oxyCODONE IR 5 MG tablet    ROXICODONE    15 tablet    Take 1-2 tablets (5-10 mg) by mouth every 3 hours as needed for pain or other (Moderate to Severe)    Acute postoperative pain       potassium chloride SA 20 MEQ CR tablet    K-DUR/KLOR-CON M    90 tablet    Take 1 tablet (20 mEq) by mouth daily    Hypokalemia       pravastatin 10 MG tablet    PRAVACHOL    90 tablet    Take 2 tablets (20 mg) by mouth daily    Hyperlipidemia LDL goal <100       Propylene Glycol-Glycerin 1-0.3 % Soln    ARTIFICIAL TEARS    30 mL    Apply 4 drops to eye every 2 hours (while awake)    Post-operative state       RA VITAMIN B-12 TR 1000 MCG Tbcr   Generic drug:  cyanocobalamin      Take 1,000 mcg by mouth every morning        rifaximin 550 MG Tabs tablet    XIFAXAN    60 tablet    Take 1 tablet (550 mg) by mouth 2 times daily    Hepatic encephalopathy (H)       spironolactone 25 MG tablet    ALDACTONE    90 tablet    Take 1 tablet (25 mg) by mouth daily     Other ascites       THERAVITE PO      Take 1 tablet by mouth every morning        VITAMIN D-3 PO      Take 2,000 Units by mouth every morning

## 2017-11-22 NOTE — LETTER
11/22/2017       RE: Frandy Workman  [address]   [facility] [address]     Dear Colleague,    Thank you for referring your patient, Frandy Workman, to the [facility] EAR NOSE AND THROAT at [facility]. Please see a copy of my visit note below.    Facial Plastic and Reconstructive Surgery    Frandy Workman presents postop right upper eyelid weight and lower lid tarsal strip. He is doing very well. He had minimal bruising.  He feels significantly more comfortable with his eye. He is using eyedrops.     On exam he has edema of the upper eyelid. He has full eyelid closure. The lower lid is in good position. The lateral incision and the upper eyelid incision are well-healed.    We expect him to have improvement in the recovery of his facial nerve function. He has completed his antibiotic infusions. I will have him back in 2 months to evaluate his progress. He may need a right brow lift in the future.        Again, thank you for allowing me to participate in the care of your patient.      Sincerely,    Milana Malave MD

## 2017-11-22 NOTE — PROGRESS NOTES
Facial Plastic and Reconstructive Surgery    Frandy Workman presents postop right upper eyelid weight and lower lid tarsal strip. He is doing very well. He had minimal bruising.  He feels significantly more comfortable with his eye. He is using eyedrops.     On exam he has edema of the upper eyelid. He has full eyelid closure. The lower lid is in good position. The lateral incision and the upper eyelid incision are well-healed.    We expect him to have improvement in the recovery of his facial nerve function. He has completed his antibiotic infusions. I will have him back in 2 months to evaluate his progress. He may need a right brow lift in the future.

## 2017-11-27 NOTE — PROGRESS NOTES
PICC removed without difficulty.  Orders from Dr. Noel completed.  Pt to f/u with ENT MD.  Appt. Scheduled and pt aware.

## 2017-11-30 LAB
FUNGUS SPEC CULT: NORMAL
FUNGUS SPEC CULT: NORMAL
Lab: NORMAL
SPECIMEN SOURCE: NORMAL
SPECIMEN SOURCE: NORMAL

## 2017-12-01 ENCOUNTER — OFFICE VISIT (OUTPATIENT)
Dept: OTOLARYNGOLOGY | Facility: CLINIC | Age: 52
End: 2017-12-01

## 2017-12-01 VITALS — WEIGHT: 187 LBS | BODY MASS INDEX: 27.7 KG/M2 | HEIGHT: 69 IN

## 2017-12-01 DIAGNOSIS — K11.3 PAROTID ABSCESS: ICD-10-CM

## 2017-12-01 DIAGNOSIS — R29.810 FACIAL WEAKNESS: Primary | ICD-10-CM

## 2017-12-01 RX ORDER — OMEPRAZOLE 40 MG/1
CAPSULE, DELAYED RELEASE ORAL
Refills: 2 | COMMUNITY
Start: 2017-09-20 | End: 2018-10-15

## 2017-12-01 ASSESSMENT — PAIN SCALES - GENERAL: PAINLEVEL: NO PAIN (0)

## 2017-12-01 NOTE — LETTER
12/1/2017       RE: Frandy Workman  [address]   [facility] [address]     Dear Colleague,    Thank you for referring your patient, Frandy Workman, to the [facility] EAR NOSE AND THROAT at [facility]. Please see a copy of my visit note below.    Dear Dr. Rueda:    I had the pleasure of seeing Frandy Workman in follow-up today at the [facility].     History of Present Illness:   Frandy Workman is a 52-year-old gentleman who was initially referred for evaluation of a right parotid lesion. He was evaluated by Dr. Rueda who obtained a CT scan which showed a mass of the right parotid region. The patient was having significant pain associated with the mass. He underwent an FNA in clinic that showed inflammatory cells and no malignancy. He was placed on a course of augmentin and sent for image guided FNA of the mass. The mass again was nondiagnostic with just inflammatory cells but the mass appeared to have increased in size on the U/S. His case was discussed at tumor board and the recommendation was for surgical excision. He was taken to the OR on 11/2/2017 for a parotidectomy. Intraoperatively there was expression of purulence from the mass, intraoperative cytology and frozen sections were consistent with an abscess. The abscess was encasing the superior division of the facial nerve. The nerve was preserved in the OR. Cultures showed strep that was penicillin sensitive. Postoperatively ID was consulted and recommended a prolonged course of IV antibiotics. He was discharged with a PICC and ertapenem.     Interval history:   He comes in today for follow-up. Since his last visit he had an eyelid weight and lateral tarsal strip performed by Dr Malave for the upper face weakness. He continues to have epiphora of the eye. He uses drops but no ointment. He has now completed his IV antibiotic course and had his PICC removed. He has not had  recurrence of the significant preoperative pain that he was previously experiencing.       MEDICATIONS:     Current Outpatient Prescriptions   Medication Sig Dispense Refill     oxyCODONE IR (ROXICODONE) 5 MG tablet Take 1-2 tablets (5-10 mg) by mouth every 3 hours as needed for pain or other (Moderate to Severe) 15 tablet 0     erythromycin (ROMYCIN) ophthalmic ointment Place 1 Application into the right eye 3 times daily 3.5 g 1     Artificial Tear Ointment (ARTIFICIAL TEARS) OINT Apply 1 Application to eye At Bedtime 3.5 g 11     Propylene Glycol-Glycerin (ARTIFICIAL TEARS) 1-0.3 % SOLN Apply 4 drops to eye every 2 hours (while awake) 30 mL 11     hypromellose-dextran (ARTIFICAL TEARS) SOLN ophthalmic solution Place 1 drop into the right eye every hour as needed for dry eyes Apply at least 4 times daily and as needed for dry eye 1 Bottle 3     dapagliflozin (FARXIGA) 10 MG TABS tablet Take 1 tablet (10 mg) by mouth daily (Patient taking differently: Take 10 mg by mouth every morning ) 90 tablet 3     pravastatin (PRAVACHOL) 10 MG tablet Take 2 tablets (20 mg) by mouth daily (Patient taking differently: Take 10 mg by mouth every morning ) 90 tablet 3     blood glucose monitoring (ACCU-CHEK EDINSON PLUS) test strip Use to test blood sugar 4 times daily 400 each 3     blood glucose monitoring (ACCU-CHEK FASTCLIX) lancets Use to test blood sugar 4 times daily or as directed.  1 box = 102 lancets 408 each 3     rifaximin (XIFAXAN) 550 MG TABS tablet Take 1 tablet (550 mg) by mouth 2 times daily 60 tablet 11     lactulose (CHRONULAC) 10 GM/15ML solution Take 45 mLs (30 g) by mouth 4 times daily (Patient taking differently: Take 60 g by mouth 4 times daily 2-6 TIMES A DAY) 7200 mL 11     insulin degludec (TRESIBA) 200 UNIT/ML pen Take 120 units daily. (Patient taking differently: Inject 110 Units Subcutaneous every morning Take 110 units dailY AS OF 10/19/17) 36 mL 3     spironolactone (ALDACTONE) 25 MG tablet Take 1  tablet (25 mg) by mouth daily (Patient taking differently: Take 25 mg by mouth every morning ) 90 tablet 1     insulin pen needle (BD VIKTORIA U/F) 32G X 4 MM Use as directed. 100 each 11     carvedilol (COREG) 12.5 MG tablet Take 1 tablet (12.5 mg) by mouth 2 times daily (with meals) 180 tablet 3     OLANZapine (ZYPREXA) 2.5 MG tablet Take 1 tablet (2.5 mg) by mouth At Bedtime 30 tablet 5     potassium chloride SA (K-DUR/KLOR-CON M) 20 MEQ CR tablet Take 1 tablet (20 mEq) by mouth daily (Patient taking differently: Take 20 mEq by mouth every morning ) 90 tablet 3     Cholecalciferol (VITAMIN D-3 PO) Take 2,000 Units by mouth every morning        insulin aspart (NOVOLOG FLEXPEN) 100 UNIT/ML injection 1 unit per 4 Gms CHO at meals and snacks + correction scale of 1 unit per 25 mg/dL over 125. Average daily dose is 75 units. 24 mL 11     cyanocobalamin (RA VITAMIN B-12 TR) 1000 MCG TBCR Take 1,000 mcg by mouth every morning        Multiple Vitamin (THERAVITE PO) Take 1 tablet by mouth every morning        omeprazole (PRILOSEC) 40 MG capsule   2       ALLERGIES:    Allergies   Allergen Reactions     Codeine Other (See Comments)     Cannot take due to liver  Cannot tolerate oral narcotics       HABITS/SOCIAL HISTORY:   Chewing tobacco use  Quit alcohol in   Wife   Daughter lives in [place]    Social History     Social History     Marital status:      Spouse name: N/A     Number of children: N/A     Years of education: N/A     Occupational History     Not on file.     Social History Main Topics     Smoking status: Never Smoker     Smokeless tobacco: Former User     Types: Chew     Quit date: 10/31/2017      Comment: 1 tin per week     Alcohol use No      Comment: quit 1996     Drug use: No     Sexual activity: Not Currently     Partners: Female     Birth control/ protection: Condom     Other Topics Concern     Not on file     Social History Narrative       PAST MEDICAL HISTORY:   Past Medical  History:   Diagnosis Date     Anemia 2013    Low blood plates current is 37     BPH (benign prostatic hyperplasia)      Cholelithiasis      Conductive hearing loss 8/16/2017    Have a lump on my right side of my face.  Had wax discharge     Depressive disorder 1986    Suffer effects throughout life     Gastroesophageal reflux disease 12/1/2014    Being treated with Prilosac     Hepatitis 2014    Diagnosed with schrosis ESTRADA in 2014.  Suffer from hepatatie     Hyperlipidemia      Liver cirrhosis secondary to ESTRADA (H)      Thrombocytopenia (H)      Type II diabetes mellitus (H)         PAST SURGICAL HISTORY:   Past Surgical History:   Procedure Laterality Date     ESOPHAGOSCOPY, GASTROSCOPY, DUODENOSCOPY (EGD), COMBINED N/A 11/17/2016    Procedure: COMBINED ESOPHAGOSCOPY, GASTROSCOPY, DUODENOSCOPY (EGD);  Surgeon: Santi Rosas MD;  Location:  GI     ESOPHAGOSCOPY, GASTROSCOPY, DUODENOSCOPY (EGD), COMBINED N/A 11/17/2017    Procedure: COMBINED ESOPHAGOSCOPY, GASTROSCOPY, DUODENOSCOPY (EGD);  EGD;  Surgeon: Santi Rosas MD;  Location:  GI     HEAD & NECK SURGERY       IMPLANT GOLD WEIGHT EYELID Right 11/16/2017    Procedure: IMPLANT WEIGHT EYELID;  Right Upper Eyelid Weight, right tarsal strip lower eyelid;  Surgeon: Milana Malave MD;  Location: [facility] OR     KNEE SURGERY Left      ORTHOPEDIC SURGERY       PAROTIDECTOMY, RADICAL NECK DISSECTION Right 11/2/2017    Procedure: PAROTIDECTOMY, RADICAL NECK DISSECTION;  Right Superfacial Parotidectomy , Facial nerve repair. with [facility] facial nerve monitor.;  Surgeon: Asiya Morgan MD;  Location: [facility] OR     PICC INSERTION Left 11/06/2017    4fr SL BioFlo PICC, 44cm in the L basilic vein w/ tip in the low SVC     VASCULAR SURGERY         FAMILY HISTORY:    Family History   Problem Relation Age of Onset     Prostate Cancer Maternal Grandfather      Substance Abuse Maternal Grandfather      Alcohol     Colon Cancer Father 60     Pancreatic Cancer  "Father 60     Prostate Cancer Father      Colorectal Cancer Father      Macular Degeneration Father      CANCER Father      Colorectal Cancer Maternal Grandmother      CANCER Maternal Grandmother      Substance Abuse Maternal Grandmother      Alcohol     Colorectal Cancer Paternal Grandmother      CANCER Mother      DIABETES Mother       3/2016     CEREBROVASCULAR DISEASE Mother      Passed away in Feb of this year, 80 years old.     Thyroid Disease Mother      Depression Mother      Asthma Sister      Had since birth     Thyroid Disease Sister      Depression Sister      Liver Disease No family hx of        REVIEW OF SYSTEMS:  12 point ROS was negative other than the symptoms noted above in the HPI.  Patient Supplied Answers to Review of Systems  [facility] ENT ROS 2017   Constitutional Problems with sleep   Neurology Headache   Eyes -   Ears, Nose, Throat Ringing/noise in ears, Nasal congestion or drainage, Trouble swallowing   Cardiopulmonary -   Gastrointestinal/Genitourinary -   Musculoskeletal -   Allergy/Immunology -   Hematologic -   Endocrine -   Skin -         PHYSICAL EXAMINATION:   Ht 1.74 m (5' 8.5\")  Wt 84.8 kg (187 lb)  BMI 28.02 kg/m2   Patient in NAD  No evidence of recurrent mass in the parotid bed  Incision well healed with some crusting present along incision  Some improved movement of lower face, twitching around brow, complete eye closure without scleral show with eyelid weight in place, lower lid in good position    IMPRESSION AND PLAN:   Frandy Workman is a 52-year-old gentleman with a right-sided parotid mass s/p superficial parotidectomy. Final pathology was consistent with an abscess growing penicillin sensitive strep. He has now completed his antibiotic course without evidence of recurrence of the abscess. There is some improvement in the movement of the facial nerve in the lower division, and there is some twitching in the upper division. I will plan on seeing him back in clinic " in about 2 months. We will place a referral for facial nerve rehab in the meantime.    Thank you very much for the opportunity to participate in the care of your patient.      Asiya Morgan M.D.  Otolaryngology- Head & Neck Surgery      CC:  Natalie Russell MD  [address]  [facility] [address]      Sheba Rueda MD  Otolaryngology/Head & Neck Surgery  [facility] [address]      Sergio Mee Noel MD  Department of Infectious Disease  [facility]

## 2017-12-01 NOTE — MR AVS SNAPSHOT
"              After Visit Summary   12/1/2017    Frandy Workman    MRN: [id number]           Patient Information     Date Of Birth          1965        Visit Information        Provider Department      12/1/2017 3:40 PM Asiya Morgan MD [facility] Ear Nose and Throat        Today's Diagnoses     Facial weakness    -  1      Care Instructions    You will need  to schedule a follow up appointment in 2 - 2 1/2 months.  You will have a consult with physical therapy for facial weakness.   Please call our clinic for any questions,concerns,or worsening symptoms.      Clinic #[phone]       Option 3  for the triage nurse.          Follow-ups after your visit        Additional Services     SPEECH THERAPY REFERRAL       *This therapy referral will be filtered to a centralized scheduling office at [facility] and the patient will receive a call to schedule an appointment at a [place] location most convenient for them. *     [facility] provides Speech Therapy evaluation and treatment and many specialty services across the [place] system.  If requesting a specialty program, please choose from the list below.  If you have not heard from the scheduling office within 2 business days, please call [phone] for all locations, with the exception of [place], please call [phone].       Treatment: Evaluation & Treatment  Speech Treatment Diagnosis: facial weakness  Special Instructions:   Special Programs: Facial Paralysis    Please be aware that coverage of these services is subject to the terms and limitations of your health insurance plan.  Call member services at your health plan with any benefit or coverage questions.      **Note to Provider:  If you are referring outside of [place] for the therapy appointment, please list the name of the location in the \"special instructions\" above, print the referral and give to the patient to schedule the appointment.             "      Your next 10 appointments already scheduled     Dec 08, 2017 10:00 AM CST   (Arrive by 9:45 AM)   Return Visit with Sergio Noel MD   [facility] ([facility])    [address] [place] [id number] [phone]            Dec 11, 2017  2:00 PM CST   (Arrive by 1:45 PM)   Return Visit with Natalie Russell MD   [facility] ([facility])    [address] [place] [id number] [phone]            Dec 13, 2017 10:00 AM CST   US ABDOMEN COMPLETE with UCUS1   [facility] US ([facility])    [address] [place] [address] [phone]           Please bring a list of your medicines (including vitamins, minerals and over-the-counter drugs). Also, tell your doctor about any allergies you may have. Wear comfortable clothes and leave your valuables at home.  Adults: No eating or drinking for 8 hours before the exam. You may take medicine with a small sip of water.  Children: - Children 6+ years: No food or drink for 6 hours before exam. - Children 1-5 years: No food or drink for 4 hours before exam. - Infants, breast-fed: may have breast milk up to 2 hours before exam. - Infants, formula: may have bottle until 4 hours before exam.  Please call the Imaging Department at your exam site with any questions.            Jan 24, 2018  2:00 PM CST   (Arrive by 1:45 PM)   Return Visit with Milana Malave MD   [facility] Ear Nose and Throat ([facility])    [address] [place] [id number] [phone]            Feb 21, 2018 12:00 PM CST   (Arrive by 11:45 AM)   RETURN ENDOCRINE with Jennifer Wilcox MD   [facility] Endocrinology ([facility])    [address] [place] [id number]    [phone]            Mar 08, 2018 12:30 PM CST   (Arrive by 12:15 PM)   Return Visit with Lamin Gonzalez DPM   [facility] Endocrinology ([facility])    [address] [place] [address] [phone]            Apr 16, 2018 12:00 PM CDT   Lab with [facility]   [facility] Lab ([facility])    [address] [place] [address] [phone]            Apr 16, 2018  1:00 PM CDT   (Arrive by 12:45 PM)   Return General Liver with Santi Dotson MD   [facility] Hepatology ([facility])    [address] [place] [address] [phone]              Who to contact     Please call your clinic at [phone] to:    Ask questions about your health    Make or cancel appointments    Discuss your medicines    Learn about your test results    Speak to your doctor   If you have compliments or concerns about an experience at your clinic, or if you wish to file a complaint, please contact [facility] Patient Relations at [phone] or email us at [email or web address]         Additional Information About Your Visit        [employer] Information     [employer] gives you secure access to your electronic health record. If you see a primary care provider, you can also send messages to your care team and make appointments. If you have questions, please call your primary care clinic.  If you do not have a primary care provider, please call [phone] and they will assist you.      [employer] is an electronic gateway that provides easy, online access to your medical records. With [employer], you can request a clinic appointment, read your test results, renew a prescription or communicate with your care team.     To access your existing account, please contact your [facility] or call [phone] for assistance.        Care  "EveryWhere ID     This is your Care EveryWhere ID. This could be used by other organizations to access your [facility] medical records  [id number]        Your Vitals Were     Height BMI (Body Mass Index)                1.74 m (5' 8.5\") 28.02 kg/m2           Blood Pressure from Last 3 Encounters:   11/20/17 104/48   11/19/17 115/55   11/18/17 123/58    Weight from Last 3 Encounters:   12/01/17 84.8 kg (187 lb)   11/22/17 89.4 kg (197 lb)   11/17/17 84.8 kg (187 lb)              We Performed the Following     SPEECH THERAPY REFERRAL          Today's Medication Changes          These changes are accurate as of: 12/1/17  4:28 PM.  If you have any questions, ask your nurse or doctor.               These medicines have changed or have updated prescriptions.        Dose/Directions    dapagliflozin 10 MG Tabs tablet   Commonly known as:  FARXIGA   This may have changed:  when to take this   Used for:  Type 2 diabetes mellitus with hyperglycemia, with long-term current use of insulin (H)        Dose:  10 mg   Take 1 tablet (10 mg) by mouth daily   Quantity:  90 tablet   Refills:  3       insulin degludec 200 UNIT/ML pen   Commonly known as:  TRESIBA   This may have changed:    - how much to take  - how to take this  - when to take this  - additional instructions   Used for:  Type 2 diabetes mellitus with hyperglycemia, with long-term current use of insulin (H)        Take 120 units daily.   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 (H)        Dose:  30 g   Take 45 mLs (30 g) by mouth 4 times daily   Quantity:  7200 mL   Refills:  11       potassium chloride SA 20 MEQ CR tablet   Commonly known as:  K-DUR/KLOR-CON M   This may have changed:  when to take this   Used for:  Hypokalemia        Dose:  20 mEq   Take 1 tablet (20 mEq) by mouth daily "   Quantity:  90 tablet   Refills:  3       pravastatin 10 MG tablet   Commonly known as:  PRAVACHOL   This may have changed:    - how much to take  - when to take this   Used for:  Hyperlipidemia LDL goal <100        Dose:  20 mg   Take 2 tablets (20 mg) by mouth daily   Quantity:  90 tablet   Refills:  3       spironolactone 25 MG tablet   Commonly known as:  ALDACTONE   This may have changed:  when to take this   Used for:  Other ascites        Dose:  25 mg   Take 1 tablet (25 mg) by mouth daily   Quantity:  90 tablet   Refills:  1                Primary Care Provider Office Phone # Fax #    Natalie Mitzi Andrés Russell [phone]       [address] [place] [address]        Equal Access to Services     MINDI RODRIGUEZ : Amelia huntero Rasheeda, waadrianeda porsha, qaybta kaalmada leonard, emy vuong. So [facility] [phone].    ATENCIÓN: Si habla español, tiene a hanley disposición servicios gratuitos de asistencia lingüística. [facility] [phone].    We comply with applicable federal civil rights laws and [place] laws. We do not discriminate on the basis of race, color, national origin, age, disability, sex, sexual orientation, or gender identity.            Thank you!     Thank you for choosing [facility] EAR NOSE AND THROAT  for your care. Our goal is always to provide you with excellent care. Hearing back from our patients is one way we can continue to improve our services. Please take a few minutes to complete the written survey that you may receive in the mail after your visit with us. Thank you!             Your Updated Medication List - Protect others around you: Learn how to safely use, store and throw away your medicines at www.disposemymeds.org.          This list is accurate as of: 12/1/17  4:28 PM.  Always use your most recent med list.                   Brand Name Dispense Instructions for use Diagnosis    ARTIFICIAL TEARS Oint     3.5 g     Apply 1 Application to eye At Bedtime    Post-operative state       blood glucose monitoring lancets     408 each    Use to test blood sugar 4 times daily or as directed.  1 box = 102 lancets    Type II diabetes mellitus (H)       blood glucose monitoring test strip    ACCU-CHEK EDINSON PLUS    400 each    Use to test blood sugar 4 times daily    Type II diabetes mellitus (H)       carvedilol 12.5 MG tablet    COREG    180 tablet    Take 1 tablet (12.5 mg) by mouth 2 times daily (with meals)    Liver cirrhosis secondary to ESTRADA (H), Secondary esophageal varices without bleeding (H)       dapagliflozin 10 MG Tabs tablet    FARXIGA    90 tablet    Take 1 tablet (10 mg) by mouth daily    Type 2 diabetes mellitus with hyperglycemia, with long-term current use of insulin (H)       erythromycin ophthalmic ointment    ROMYCIN    3.5 g    Place 1 Application into the right eye 3 times daily    Post-operative state       hypromellose-dextran Soln ophthalmic solution     1 Bottle    Place 1 drop into the right eye every hour as needed for dry eyes Apply at least 4 times daily and as needed for dry eye    Dry eyes       insulin aspart 100 UNIT/ML injection    NovoLOG FLEXPEN    24 mL    1 unit per 4 Gms CHO at meals and snacks + correction scale of 1 unit per 25 mg/dL over 125. Average daily dose is 75 units.    Type II diabetes mellitus (H)       insulin degludec 200 UNIT/ML pen    TRESIBA    36 mL    Take 120 units daily.    Type 2 diabetes mellitus with hyperglycemia, with long-term current use of insulin (H)       insulin pen needle 32G X 4 MM    BD VIKTORIA U/F    100 each    Use as directed.    Type II diabetes mellitus (H)       lactulose 10 GM/15ML solution    CHRONULAC    7200 mL    Take 45 mLs (30 g) by mouth 4 times daily    Liver cirrhosis secondary to ESTRADA (H)       OLANZapine 2.5 MG tablet    zyPREXA    30 tablet    Take 1 tablet (2.5 mg) by mouth At Bedtime    Insomnia, unspecified type       omeprazole 40 MG  capsule    priLOSEC          oxyCODONE IR 5 MG tablet    ROXICODONE    15 tablet    Take 1-2 tablets (5-10 mg) by mouth every 3 hours as needed for pain or other (Moderate to Severe)    Acute postoperative pain       potassium chloride SA 20 MEQ CR tablet    K-DUR/KLOR-CON M    90 tablet    Take 1 tablet (20 mEq) by mouth daily    Hypokalemia       pravastatin 10 MG tablet    PRAVACHOL    90 tablet    Take 2 tablets (20 mg) by mouth daily    Hyperlipidemia LDL goal <100       Propylene Glycol-Glycerin 1-0.3 % Soln    ARTIFICIAL TEARS    30 mL    Apply 4 drops to eye every 2 hours (while awake)    Post-operative state       RA VITAMIN B-12 TR 1000 MCG Tbcr   Generic drug:  cyanocobalamin      Take 1,000 mcg by mouth every morning        rifaximin 550 MG Tabs tablet    XIFAXAN    60 tablet    Take 1 tablet (550 mg) by mouth 2 times daily    Hepatic encephalopathy (H)       spironolactone 25 MG tablet    ALDACTONE    90 tablet    Take 1 tablet (25 mg) by mouth daily    Other ascites       THERAVITE PO      Take 1 tablet by mouth every morning        VITAMIN D-3 PO      Take 2,000 Units by mouth every morning

## 2017-12-01 NOTE — PATIENT INSTRUCTIONS
You will need  to schedule a follow up appointment in 2 - 2 1/2 months.  You will have a consult with physical therapy for facial weakness.   Please call our clinic for any questions,concerns,or worsening symptoms.      Clinic #[phone]       Option 3  for the triage nurse.

## 2017-12-03 NOTE — PROGRESS NOTES
Dear Dr. Rueda:    I had the pleasure of seeing Frandy Workman in follow-up today at the [facility].     History of Present Illness:   Frandy Workman is a 52-year-old gentleman who was initially referred for evaluation of a right parotid lesion. He was evaluated by Dr. Rueda who obtained a CT scan which showed a mass of the right parotid region. The patient was having significant pain associated with the mass. He underwent an FNA in clinic that showed inflammatory cells and no malignancy. He was placed on a course of augmentin and sent for image guided FNA of the mass. The mass again was nondiagnostic with just inflammatory cells but the mass appeared to have increased in size on the U/S. His case was discussed at tumor board and the recommendation was for surgical excision. He was taken to the OR on 11/2/2017 for a parotidectomy. Intraoperatively there was expression of purulence from the mass, intraoperative cytology and frozen sections were consistent with an abscess. The abscess was encasing the superior division of the facial nerve. The nerve was preserved in the OR. Cultures showed strep that was penicillin sensitive. Postoperatively ID was consulted and recommended a prolonged course of IV antibiotics. He was discharged with a PICC and ertapenem.     Interval history:   He comes in today for follow-up. Since his last visit he had an eyelid weight and lateral tarsal strip performed by Dr Malave for the upper face weakness. He continues to have epiphora of the eye. He uses drops but no ointment. He has now completed his IV antibiotic course and had his PICC removed. He has not had recurrence of the significant preoperative pain that he was previously experiencing.       MEDICATIONS:     Current Outpatient Prescriptions   Medication Sig Dispense Refill     oxyCODONE IR (ROXICODONE) 5 MG tablet Take 1-2 tablets (5-10 mg) by mouth every 3 hours as needed for pain or  other (Moderate to Severe) 15 tablet 0     erythromycin (ROMYCIN) ophthalmic ointment Place 1 Application into the right eye 3 times daily 3.5 g 1     Artificial Tear Ointment (ARTIFICIAL TEARS) OINT Apply 1 Application to eye At Bedtime 3.5 g 11     Propylene Glycol-Glycerin (ARTIFICIAL TEARS) 1-0.3 % SOLN Apply 4 drops to eye every 2 hours (while awake) 30 mL 11     hypromellose-dextran (ARTIFICAL TEARS) SOLN ophthalmic solution Place 1 drop into the right eye every hour as needed for dry eyes Apply at least 4 times daily and as needed for dry eye 1 Bottle 3     dapagliflozin (FARXIGA) 10 MG TABS tablet Take 1 tablet (10 mg) by mouth daily (Patient taking differently: Take 10 mg by mouth every morning ) 90 tablet 3     pravastatin (PRAVACHOL) 10 MG tablet Take 2 tablets (20 mg) by mouth daily (Patient taking differently: Take 10 mg by mouth every morning ) 90 tablet 3     blood glucose monitoring (ACCU-CHEK EDINSON PLUS) test strip Use to test blood sugar 4 times daily 400 each 3     blood glucose monitoring (ACCU-CHEK FASTCLIX) lancets Use to test blood sugar 4 times daily or as directed.  1 box = 102 lancets 408 each 3     rifaximin (XIFAXAN) 550 MG TABS tablet Take 1 tablet (550 mg) by mouth 2 times daily 60 tablet 11     lactulose (CHRONULAC) 10 GM/15ML solution Take 45 mLs (30 g) by mouth 4 times daily (Patient taking differently: Take 60 g by mouth 4 times daily 2-6 TIMES A DAY) 7200 mL 11     insulin degludec (TRESIBA) 200 UNIT/ML pen Take 120 units daily. (Patient taking differently: Inject 110 Units Subcutaneous every morning Take 110 units dailY AS OF 10/19/17) 36 mL 3     spironolactone (ALDACTONE) 25 MG tablet Take 1 tablet (25 mg) by mouth daily (Patient taking differently: Take 25 mg by mouth every morning ) 90 tablet 1     insulin pen needle (BD VIKTORIA U/F) 32G X 4 MM Use as directed. 100 each 11     carvedilol (COREG) 12.5 MG tablet Take 1 tablet (12.5 mg) by mouth 2 times daily (with meals) 180 tablet  3     OLANZapine (ZYPREXA) 2.5 MG tablet Take 1 tablet (2.5 mg) by mouth At Bedtime 30 tablet 5     potassium chloride SA (K-DUR/KLOR-CON M) 20 MEQ CR tablet Take 1 tablet (20 mEq) by mouth daily (Patient taking differently: Take 20 mEq by mouth every morning ) 90 tablet 3     Cholecalciferol (VITAMIN D-3 PO) Take 2,000 Units by mouth every morning        insulin aspart (NOVOLOG FLEXPEN) 100 UNIT/ML injection 1 unit per 4 Gms CHO at meals and snacks + correction scale of 1 unit per 25 mg/dL over 125. Average daily dose is 75 units. 24 mL 11     cyanocobalamin (RA VITAMIN B-12 TR) 1000 MCG TBCR Take 1,000 mcg by mouth every morning        Multiple Vitamin (THERAVITE PO) Take 1 tablet by mouth every morning        omeprazole (PRILOSEC) 40 MG capsule   2       ALLERGIES:    Allergies   Allergen Reactions     Codeine Other (See Comments)     Cannot take due to liver  Cannot tolerate oral narcotics       HABITS/SOCIAL HISTORY:   Chewing tobacco use  Quit alcohol in   Wife   Daughter lives in [place]    Social History     Social History     Marital status:      Spouse name: N/A     Number of children: N/A     Years of education: N/A     Occupational History     Not on file.     Social History Main Topics     Smoking status: Never Smoker     Smokeless tobacco: Former User     Types: Chew     Quit date: 10/31/2017      Comment: 1 tin per week     Alcohol use No      Comment: quit 1996     Drug use: No     Sexual activity: Not Currently     Partners: Female     Birth control/ protection: Condom     Other Topics Concern     Not on file     Social History Narrative       PAST MEDICAL HISTORY:   Past Medical History:   Diagnosis Date     Anemia 2013    Low blood plates current is 37     BPH (benign prostatic hyperplasia)      Cholelithiasis      Conductive hearing loss 2017    Have a lump on my right side of my face.  Had wax discharge     Depressive disorder     Suffer effects throughout  life     Gastroesophageal reflux disease 12/1/2014    Being treated with Prilosac     Hepatitis 2014    Diagnosed with schrosis ESTRADA in 2014.  Suffer from hepatatie     Hyperlipidemia      Liver cirrhosis secondary to ESTRADA (H)      Thrombocytopenia (H)      Type II diabetes mellitus (H)         PAST SURGICAL HISTORY:   Past Surgical History:   Procedure Laterality Date     ESOPHAGOSCOPY, GASTROSCOPY, DUODENOSCOPY (EGD), COMBINED N/A 11/17/2016    Procedure: COMBINED ESOPHAGOSCOPY, GASTROSCOPY, DUODENOSCOPY (EGD);  Surgeon: Santi Rosas MD;  Location:  GI     ESOPHAGOSCOPY, GASTROSCOPY, DUODENOSCOPY (EGD), COMBINED N/A 11/17/2017    Procedure: COMBINED ESOPHAGOSCOPY, GASTROSCOPY, DUODENOSCOPY (EGD);  EGD;  Surgeon: Santi Rosas MD;  Location:  GI     HEAD & NECK SURGERY       IMPLANT GOLD WEIGHT EYELID Right 11/16/2017    Procedure: IMPLANT WEIGHT EYELID;  Right Upper Eyelid Weight, right tarsal strip lower eyelid;  Surgeon: Milana Malave MD;  Location: [facility] OR     KNEE SURGERY Left      ORTHOPEDIC SURGERY       PAROTIDECTOMY, RADICAL NECK DISSECTION Right 11/2/2017    Procedure: PAROTIDECTOMY, RADICAL NECK DISSECTION;  Right Superfacial Parotidectomy , Facial nerve repair. with [facility] facial nerve monitor.;  Surgeon: Asiya Morgan MD;  Location: [facility] OR     PICC INSERTION Left 11/06/2017    4fr SL BioFlo PICC, 44cm in the L basilic vein w/ tip in the low SVC     VASCULAR SURGERY         FAMILY HISTORY:    Family History   Problem Relation Age of Onset     Prostate Cancer Maternal Grandfather      Substance Abuse Maternal Grandfather      Alcohol     Colon Cancer Father 60     Pancreatic Cancer Father 60     Prostate Cancer Father      Colorectal Cancer Father      Macular Degeneration Father      CANCER Father      Colorectal Cancer Maternal Grandmother      CANCER Maternal Grandmother      Substance Abuse Maternal Grandmother      Alcohol     Colorectal Cancer Paternal Grandmother   "    CANCER Mother      DIABETES Mother       3/2016     CEREBROVASCULAR DISEASE Mother      Passed away in Feb of this year, 80 years old.     Thyroid Disease Mother      Depression Mother      Asthma Sister      Had since birth     Thyroid Disease Sister      Depression Sister      Liver Disease No family hx of        REVIEW OF SYSTEMS:  12 point ROS was negative other than the symptoms noted above in the HPI.  Patient Supplied Answers to Review of Systems  [facility] ROS 2017   Constitutional Problems with sleep   Neurology Headache   Eyes -   Ears, Nose, Throat Ringing/noise in ears, Nasal congestion or drainage, Trouble swallowing   Cardiopulmonary -   Gastrointestinal/Genitourinary -   Musculoskeletal -   Allergy/Immunology -   Hematologic -   Endocrine -   Skin -         PHYSICAL EXAMINATION:   Ht 1.74 m (5' 8.5\")  Wt 84.8 kg (187 lb)  BMI 28.02 kg/m2   Patient in NAD  No evidence of recurrent mass in the parotid bed  Incision well healed with some crusting present along incision  Some improved movement of lower face, twitching around brow, complete eye closure without scleral show with eyelid weight in place, lower lid in good position    IMPRESSION AND PLAN:   Frandy Workman is a 52-year-old gentleman with a right-sided parotid mass s/p superficial parotidectomy. Final pathology was consistent with an abscess growing penicillin sensitive strep. He has now completed his antibiotic course without evidence of recurrence of the abscess. There is some improvement in the movement of the facial nerve in the lower division, and there is some twitching in the upper division. I will plan on seeing him back in clinic in about 2 months. We will place a referral for facial nerve rehab in the meantime.    Thank you very much for the opportunity to participate in the care of your patient.      Asiya Morgan M.D.  Otolaryngology- Head & Neck Surgery          CC:  Natalie Russell MD  [address] [place] [address]" [facility] [address]  [facility] [id number]        Sheba Rueda MD  Otolaryngology/Head & Neck Surgery  [facility] [address]      Sergio Noel MD  Department of Infectious Disease  [facility]

## 2017-12-04 DIAGNOSIS — R47.1 DYSARTHRIA: Primary | ICD-10-CM

## 2017-12-04 DIAGNOSIS — G51.0 FACIAL PARALYSIS: ICD-10-CM

## 2017-12-07 ENCOUNTER — MYC MEDICAL ADVICE (OUTPATIENT)
Dept: INTERNAL MEDICINE | Facility: CLINIC | Age: 52
End: 2017-12-07

## 2017-12-07 ENCOUNTER — MEDICAL CORRESPONDENCE (OUTPATIENT)
Dept: HEALTH INFORMATION MANAGEMENT | Facility: CLINIC | Age: 52
End: 2017-12-07

## 2017-12-07 ASSESSMENT — ENCOUNTER SYMPTOMS
SORE THROAT: 0
DOUBLE VISION: 0
EYE PAIN: 1
JOINT SWELLING: 0
POLYDIPSIA: 1
NECK PAIN: 0
NAIL CHANGES: 1
WEIGHT LOSS: 0
TASTE DISTURBANCE: 0
CHILLS: 0
EYE REDNESS: 1
EYE IRRITATION: 1
SINUS CONGESTION: 0
SINUS PAIN: 1
ARTHRALGIAS: 1
INCREASED ENERGY: 1
NECK MASS: 0
HOARSE VOICE: 1
ALTERED TEMPERATURE REGULATION: 1
DECREASED APPETITE: 1
EYE WATERING: 1
MYALGIAS: 0
SKIN CHANGES: 0
MUSCLE CRAMPS: 1
BACK PAIN: 1
FEVER: 0
SMELL DISTURBANCE: 0
TROUBLE SWALLOWING: 0
POOR WOUND HEALING: 0
POLYPHAGIA: 1
FATIGUE: 1
HALLUCINATIONS: 0
STIFFNESS: 1
WEIGHT GAIN: 1
NIGHT SWEATS: 0
MUSCLE WEAKNESS: 0

## 2017-12-08 ENCOUNTER — OFFICE VISIT (OUTPATIENT)
Dept: INFECTIOUS DISEASES | Facility: CLINIC | Age: 52
End: 2017-12-08
Attending: INTERNAL MEDICINE
Payer: MEDICARE

## 2017-12-08 VITALS
TEMPERATURE: 98.1 F | BODY MASS INDEX: 28.24 KG/M2 | HEART RATE: 76 BPM | SYSTOLIC BLOOD PRESSURE: 111 MMHG | OXYGEN SATURATION: 95 % | WEIGHT: 190.7 LBS | HEIGHT: 69 IN | DIASTOLIC BLOOD PRESSURE: 72 MMHG

## 2017-12-08 DIAGNOSIS — E11.3293 TYPE 2 DIABETES MELLITUS WITH MILD NONPROLIFERATIVE RETINOPATHY OF BOTH EYES WITHOUT MACULAR EDEMA, UNSPECIFIED LONG TERM INSULIN USE STATUS: ICD-10-CM

## 2017-12-08 DIAGNOSIS — K11.3 PAROTID ABSCESS: Primary | ICD-10-CM

## 2017-12-08 DIAGNOSIS — Z00.00 ROUTINE GENERAL MEDICAL EXAMINATION AT A HEALTH CARE FACILITY: ICD-10-CM

## 2017-12-08 DIAGNOSIS — K74.69 OTHER CIRRHOSIS OF LIVER (H): ICD-10-CM

## 2017-12-08 LAB
ALBUMIN SERPL-MCNC: 3 G/DL (ref 3.4–5)
ALP SERPL-CCNC: 168 U/L (ref 40–150)
ALT SERPL W P-5'-P-CCNC: 40 U/L (ref 0–70)
ANION GAP SERPL CALCULATED.3IONS-SCNC: 9 MMOL/L (ref 3–14)
AST SERPL W P-5'-P-CCNC: 39 U/L (ref 0–45)
BILIRUB SERPL-MCNC: 0.7 MG/DL (ref 0.2–1.3)
BUN SERPL-MCNC: 24 MG/DL (ref 7–30)
CALCIUM SERPL-MCNC: 9.5 MG/DL (ref 8.5–10.1)
CHLORIDE SERPL-SCNC: 104 MMOL/L (ref 94–109)
CHOLEST SERPL-MCNC: 165 MG/DL
CO2 SERPL-SCNC: 24 MMOL/L (ref 20–32)
CREAT SERPL-MCNC: 1.22 MG/DL (ref 0.66–1.25)
CREAT UR-MCNC: 35 MG/DL
ERYTHROCYTE [DISTWIDTH] IN BLOOD BY AUTOMATED COUNT: 13.2 % (ref 10–15)
GFR SERPL CREATININE-BSD FRML MDRD: 62 ML/MIN/1.7M2
GLUCOSE SERPL-MCNC: 298 MG/DL (ref 70–99)
HCT VFR BLD AUTO: 34.3 % (ref 40–53)
HDLC SERPL-MCNC: 33 MG/DL
HGB BLD-MCNC: 11.1 G/DL (ref 13.3–17.7)
LDLC SERPL CALC-MCNC: 82 MG/DL
MCH RBC QN AUTO: 33.1 PG (ref 26.5–33)
MCHC RBC AUTO-ENTMCNC: 32.4 G/DL (ref 31.5–36.5)
MCV RBC AUTO: 102 FL (ref 78–100)
MICROALBUMIN UR-MCNC: <5 MG/L
MICROALBUMIN/CREAT UR: NORMAL MG/G CR (ref 0–17)
NONHDLC SERPL-MCNC: 133 MG/DL
PLATELET # BLD AUTO: 43 10E9/L (ref 150–450)
POTASSIUM SERPL-SCNC: 4.6 MMOL/L (ref 3.4–5.3)
PROT SERPL-MCNC: 7.5 G/DL (ref 6.8–8.8)
RBC # BLD AUTO: 3.35 10E12/L (ref 4.4–5.9)
SODIUM SERPL-SCNC: 137 MMOL/L (ref 133–144)
TRIGL SERPL-MCNC: 254 MG/DL
WBC # BLD AUTO: 3.4 10E9/L (ref 4–11)

## 2017-12-08 PROCEDURE — 99213 OFFICE O/P EST LOW 20 MIN: CPT | Mod: ZF

## 2017-12-08 ASSESSMENT — PAIN SCALES - GENERAL: PAINLEVEL: NO PAIN (0)

## 2017-12-08 NOTE — LETTER
12/8/2017      RE: Frandy Workman  [address]   [facility] [address]       INFECTIOUS DISEASES PROGRESS NOTE    [facility] Outpatient Visit    SUBJECTIVE:                                                      Frandy Workman is a 52 year old male who presents to clinic today for the following health issues: right parotid abscess     He feels well. No fever, chills, pain, headaches. Swelling has subsided. No nausea, vomiting, no diarrhea. Appetite is good.   Right eye lid is better. He has stopped chewing tobacco. Received Influenza vaccination     ROS:  CONSTITUTIONAL:NEGATIVE for fever, chills, change in weight  INTEGUMENTARY/SKIN: NEGATIVE for worrisome rashes, moles or lesions  EYES: NEGATIVE for vision changes or irritation  ENT/MOUTH: NEGATIVE for ear, mouth and throat problems  RESP:NEGATIVE for significant cough or SOB  CV: NEGATIVE for chest pain, palpitations or peripheral edema  GI: NEGATIVE for nausea, abdominal pain, heartburn, or change in bowel habits  : NEGATIVE for urinary frequency, hematuria or dysuria  MUSCULOSKELETAL: NEGATIVE for significant arthralgias or myalgia  NEURO: NEGATIVE for weakness, dizziness or paresthesias  ENDOCRINE: NEGATIVE for temperature intolerance, skin/hair changes  HEME/ALLERGY/IMMUNE: NEGATIVE for bleeding problems  PSYCHIATRIC: NEGATIVE for changes in mood or affect    Problem list and histories reviewed & adjusted, as indicated.  Additional history: as documented    PAST MEDICAL HISTORY:  Patient  has a past medical history of Anemia (2013); BPH (benign prostatic hyperplasia); Cholelithiasis; Conductive hearing loss (8/16/2017); Depressive disorder (1986); Gastroesophageal reflux disease (12/1/2014); Hepatitis (2014); Hyperlipidemia; Liver cirrhosis secondary to ESTRADA (H); Thrombocytopenia (H); and Type II diabetes mellitus (H). He also has no past medical history of PONV (postoperative nausea and vomiting).    PAST SURGICAL HISTORY:  Patient  has  a past surgical history that includes knee surgery (Left); Esophagoscopy, gastroscopy, duodenoscopy (EGD), combined (N/A, 11/17/2016); Parotidectomy, radical neck dissection (Right, 11/2/2017); picc insertion (Left, 11/06/2017); vascular surgery; Head and neck surgery; orthopedic surgery; Implant gold weight eyelid (Right, 11/16/2017); and Esophagoscopy, gastroscopy, duodenoscopy (EGD), combined (N/A, 11/17/2017).    CURRENT MEDICATIONS:  Current Outpatient Prescriptions   Medication Sig Dispense Refill     omeprazole (PRILOSEC) 40 MG capsule   2     oxyCODONE IR (ROXICODONE) 5 MG tablet Take 1-2 tablets (5-10 mg) by mouth every 3 hours as needed for pain or other (Moderate to Severe) 15 tablet 0     erythromycin (ROMYCIN) ophthalmic ointment Place 1 Application into the right eye 3 times daily 3.5 g 1     Artificial Tear Ointment (ARTIFICIAL TEARS) OINT Apply 1 Application to eye At Bedtime 3.5 g 11     Propylene Glycol-Glycerin (ARTIFICIAL TEARS) 1-0.3 % SOLN Apply 4 drops to eye every 2 hours (while awake) 30 mL 11     hypromellose-dextran (ARTIFICAL TEARS) SOLN ophthalmic solution Place 1 drop into the right eye every hour as needed for dry eyes Apply at least 4 times daily and as needed for dry eye 1 Bottle 3     dapagliflozin (FARXIGA) 10 MG TABS tablet Take 1 tablet (10 mg) by mouth daily (Patient taking differently: Take 10 mg by mouth every morning ) 90 tablet 3     pravastatin (PRAVACHOL) 10 MG tablet Take 2 tablets (20 mg) by mouth daily (Patient taking differently: Take 10 mg by mouth every morning ) 90 tablet 3     blood glucose monitoring (ACCU-CHEK EDINSON PLUS) test strip Use to test blood sugar 4 times daily 400 each 3     blood glucose monitoring (ACCU-CHEK FASTCLIX) lancets Use to test blood sugar 4 times daily or as directed.  1 box = 102 lancets 408 each 3     rifaximin (XIFAXAN) 550 MG TABS tablet Take 1 tablet (550 mg) by mouth 2 times daily 60 tablet 11     lactulose (CHRONULAC) 10 GM/15ML  solution Take 45 mLs (30 g) by mouth 4 times daily (Patient taking differently: Take 60 g by mouth 4 times daily 2-6 TIMES A DAY) 7200 mL 11     insulin degludec (TRESIBA) 200 UNIT/ML pen Take 120 units daily. (Patient taking differently: Inject 110 Units Subcutaneous every morning Take 110 units dailY AS OF 10/19/17) 36 mL 3     spironolactone (ALDACTONE) 25 MG tablet Take 1 tablet (25 mg) by mouth daily (Patient taking differently: Take 25 mg by mouth every morning ) 90 tablet 1     insulin pen needle (BD VIKTORIA U/F) 32G X 4 MM Use as directed. 100 each 11     carvedilol (COREG) 12.5 MG tablet Take 1 tablet (12.5 mg) by mouth 2 times daily (with meals) 180 tablet 3     OLANZapine (ZYPREXA) 2.5 MG tablet Take 1 tablet (2.5 mg) by mouth At Bedtime 30 tablet 5     potassium chloride SA (K-DUR/KLOR-CON M) 20 MEQ CR tablet Take 1 tablet (20 mEq) by mouth daily (Patient taking differently: Take 20 mEq by mouth every morning ) 90 tablet 3     Cholecalciferol (VITAMIN D-3 PO) Take 2,000 Units by mouth every morning        insulin aspart (NOVOLOG FLEXPEN) 100 UNIT/ML injection 1 unit per 4 Gms CHO at meals and snacks + correction scale of 1 unit per 25 mg/dL over 125. Average daily dose is 75 units. 24 mL 11     cyanocobalamin (RA VITAMIN B-12 TR) 1000 MCG TBCR Take 1,000 mcg by mouth every morning        Multiple Vitamin (THERAVITE PO) Take 1 tablet by mouth every morning        ALLERGY:  Allergies   Allergen Reactions     Codeine Other (See Comments)     Cannot take due to liver  Cannot tolerate oral narcotics     IMMUNIZATION HISTORY:  Immunization History   Administered Date(s) Administered     HEPA 12/29/2015, 08/16/2016     HepB 12/29/2015, 03/18/2016, 08/16/2016     Influenza Vaccine, 3 YRS +, IM (QUADRIVALENT W/PRESERVATIVES) 09/01/2017     Pneumococcal 23 valent 09/30/2015     TDAP Vaccine (Boostrix) 12/29/2015     SOCIAL HISTORY:  Has quit tobacco chewing, no alcohol     FAMILY HISTORY:  Patient's family history  "includes Asthma in his sister; CANCER in his father, maternal grandmother, and mother; CEREBROVASCULAR DISEASE in his mother; Colon Cancer (age of onset: 60) in his father; Colorectal Cancer in his father, maternal grandmother, and paternal grandmother; DIABETES in his mother; Depression in his mother and sister; Macular Degeneration in his father; Pancreatic Cancer (age of onset: 60) in his father; Prostate Cancer in his father and maternal grandfather; Substance Abuse in his maternal grandfather and maternal grandmother; Thyroid Disease in his mother and sister. There is no history of Liver Disease.    Labs reviewed in EPIC    OBJECTIVE:                                                    /72 (BP Location: Left arm, Patient Position: Sitting, Cuff Size: Adult Regular)  Pulse 76  Temp 98.1  F (36.7  C) (Oral)  Ht 1.74 m (5' 8.5\")  Wt 86.5 kg (190 lb 11.2 oz)  SpO2 95%  BMI 28.57 kg/m2 Body mass index is 28.57 kg/(m^2).   GENERAL: healthy, alert and no distress, right facial droop - improving, right eye lid - able to open lid   EYES: right eye lid - improved, right lower facial droop PERRL and conjunctivae and sclerae normal  HENT: mouth without ulcers or lesions, surgical site - improved, swelling has improved, non tender  NECK: no adenopathy, no asymmetry, masses, or scars and thyroid normal to palpation  RESP: lungs clear to auscultation - no rales, rhonchi or wheezes  CV: regular rate and rhythm, normal S1 S2, no murmur, click or rub  ABDOMEN: soft, non tender,  no hepatosplenomegaly, no masses and bowel sounds normal  MS: no gross musculoskeletal defects noted, no edema  SKIN: no suspicious lesions or rashes  NEURO: Normal strength and tone, mentation intact and speech normal  PSYCH: mentation appears normal, affect normal  LYMPH: no cervical adenopathy       ASSESSMENT AND PLAN:                                                      1. Parotid abscess ( right side) - resolved s/p superficial " parotidectomy on 11/2/17   - FNA 10/11/17 - grew S.salivarius   - intra-op cx -11/2/17 - S. anginosus   - in hospital - he received Unasyn and D/C on Ertapenem   - s/p 2.5 weeks of Unasyn/Ertapenem      2. ESTRADA cirrhosis     3. Diabetes mellitus     4. Tobacco chewing - quit     Plan:  - condition has improved   - no further antibiotic needed  - recommend routine dental care     Plan discussed with patient     Thank You for allowing me to participate in the care of this patient    Gretel Noel MD, M.Med.Sc  Division of Infectious Diseases and International Medicine  [facility]

## 2017-12-08 NOTE — PROGRESS NOTES
INFECTIOUS DISEASES PROGRESS NOTE    [facility] Outpatient Visit    SUBJECTIVE:                                                      Frandy Workman is a 52 year old male who presents to clinic today for the following health issues: right parotid abscess     He feels well. No fever, chills, pain, headaches. Swelling has subsided. No nausea, vomiting, no diarrhea. Appetite is good.   Right eye lid is better. He has stopped chewing tobacco. Received Influenza vaccination     ROS:  CONSTITUTIONAL:NEGATIVE for fever, chills, change in weight  INTEGUMENTARY/SKIN: NEGATIVE for worrisome rashes, moles or lesions  EYES: NEGATIVE for vision changes or irritation  ENT/MOUTH: NEGATIVE for ear, mouth and throat problems  RESP:NEGATIVE for significant cough or SOB  CV: NEGATIVE for chest pain, palpitations or peripheral edema  GI: NEGATIVE for nausea, abdominal pain, heartburn, or change in bowel habits  : NEGATIVE for urinary frequency, hematuria or dysuria  MUSCULOSKELETAL: NEGATIVE for significant arthralgias or myalgia  NEURO: NEGATIVE for weakness, dizziness or paresthesias  ENDOCRINE: NEGATIVE for temperature intolerance, skin/hair changes  HEME/ALLERGY/IMMUNE: NEGATIVE for bleeding problems  PSYCHIATRIC: NEGATIVE for changes in mood or affect    Problem list and histories reviewed & adjusted, as indicated.  Additional history: as documented    PAST MEDICAL HISTORY:  Patient  has a past medical history of Anemia (2013); BPH (benign prostatic hyperplasia); Cholelithiasis; Conductive hearing loss (8/16/2017); Depressive disorder (1986); Gastroesophageal reflux disease (12/1/2014); Hepatitis (2014); Hyperlipidemia; Liver cirrhosis secondary to ESTRADA (H); Thrombocytopenia (H); and Type II diabetes mellitus (H). He also has no past medical history of PONV (postoperative nausea and vomiting).    PAST SURGICAL HISTORY:  Patient  has a past surgical history that includes knee surgery (Left); Esophagoscopy,  gastroscopy, duodenoscopy (EGD), combined (N/A, 11/17/2016); Parotidectomy, radical neck dissection (Right, 11/2/2017); picc insertion (Left, 11/06/2017); vascular surgery; Head and neck surgery; orthopedic surgery; Implant gold weight eyelid (Right, 11/16/2017); and Esophagoscopy, gastroscopy, duodenoscopy (EGD), combined (N/A, 11/17/2017).    CURRENT MEDICATIONS:  Current Outpatient Prescriptions   Medication Sig Dispense Refill     omeprazole (PRILOSEC) 40 MG capsule   2     oxyCODONE IR (ROXICODONE) 5 MG tablet Take 1-2 tablets (5-10 mg) by mouth every 3 hours as needed for pain or other (Moderate to Severe) 15 tablet 0     erythromycin (ROMYCIN) ophthalmic ointment Place 1 Application into the right eye 3 times daily 3.5 g 1     Artificial Tear Ointment (ARTIFICIAL TEARS) OINT Apply 1 Application to eye At Bedtime 3.5 g 11     Propylene Glycol-Glycerin (ARTIFICIAL TEARS) 1-0.3 % SOLN Apply 4 drops to eye every 2 hours (while awake) 30 mL 11     hypromellose-dextran (ARTIFICAL TEARS) SOLN ophthalmic solution Place 1 drop into the right eye every hour as needed for dry eyes Apply at least 4 times daily and as needed for dry eye 1 Bottle 3     dapagliflozin (FARXIGA) 10 MG TABS tablet Take 1 tablet (10 mg) by mouth daily (Patient taking differently: Take 10 mg by mouth every morning ) 90 tablet 3     pravastatin (PRAVACHOL) 10 MG tablet Take 2 tablets (20 mg) by mouth daily (Patient taking differently: Take 10 mg by mouth every morning ) 90 tablet 3     blood glucose monitoring (ACCU-CHEK EDINSON PLUS) test strip Use to test blood sugar 4 times daily 400 each 3     blood glucose monitoring (ACCU-CHEK FASTCLIX) lancets Use to test blood sugar 4 times daily or as directed.  1 box = 102 lancets 408 each 3     rifaximin (XIFAXAN) 550 MG TABS tablet Take 1 tablet (550 mg) by mouth 2 times daily 60 tablet 11     lactulose (CHRONULAC) 10 GM/15ML solution Take 45 mLs (30 g) by mouth 4 times daily (Patient taking  differently: Take 60 g by mouth 4 times daily 2-6 TIMES A DAY) 7200 mL 11     insulin degludec (TRESIBA) 200 UNIT/ML pen Take 120 units daily. (Patient taking differently: Inject 110 Units Subcutaneous every morning Take 110 units dailY AS OF 10/19/17) 36 mL 3     spironolactone (ALDACTONE) 25 MG tablet Take 1 tablet (25 mg) by mouth daily (Patient taking differently: Take 25 mg by mouth every morning ) 90 tablet 1     insulin pen needle (BD VIKTORIA U/F) 32G X 4 MM Use as directed. 100 each 11     carvedilol (COREG) 12.5 MG tablet Take 1 tablet (12.5 mg) by mouth 2 times daily (with meals) 180 tablet 3     OLANZapine (ZYPREXA) 2.5 MG tablet Take 1 tablet (2.5 mg) by mouth At Bedtime 30 tablet 5     potassium chloride SA (K-DUR/KLOR-CON M) 20 MEQ CR tablet Take 1 tablet (20 mEq) by mouth daily (Patient taking differently: Take 20 mEq by mouth every morning ) 90 tablet 3     Cholecalciferol (VITAMIN D-3 PO) Take 2,000 Units by mouth every morning        insulin aspart (NOVOLOG FLEXPEN) 100 UNIT/ML injection 1 unit per 4 Gms CHO at meals and snacks + correction scale of 1 unit per 25 mg/dL over 125. Average daily dose is 75 units. 24 mL 11     cyanocobalamin (RA VITAMIN B-12 TR) 1000 MCG TBCR Take 1,000 mcg by mouth every morning        Multiple Vitamin (THERAVITE PO) Take 1 tablet by mouth every morning        ALLERGY:  Allergies   Allergen Reactions     Codeine Other (See Comments)     Cannot take due to liver  Cannot tolerate oral narcotics     IMMUNIZATION HISTORY:  Immunization History   Administered Date(s) Administered     HEPA 12/29/2015, 08/16/2016     HepB 12/29/2015, 03/18/2016, 08/16/2016     Influenza Vaccine, 3 YRS +, IM (QUADRIVALENT W/PRESERVATIVES) 09/01/2017     Pneumococcal 23 valent 09/30/2015     TDAP Vaccine (Boostrix) 12/29/2015     SOCIAL HISTORY:  Has quit tobacco chewing, no alcohol     FAMILY HISTORY:  Patient's family history includes Asthma in his sister; CANCER in his father, maternal  "grandmother, and mother; CEREBROVASCULAR DISEASE in his mother; Colon Cancer (age of onset: 60) in his father; Colorectal Cancer in his father, maternal grandmother, and paternal grandmother; DIABETES in his mother; Depression in his mother and sister; Macular Degeneration in his father; Pancreatic Cancer (age of onset: 60) in his father; Prostate Cancer in his father and maternal grandfather; Substance Abuse in his maternal grandfather and maternal grandmother; Thyroid Disease in his mother and sister. There is no history of Liver Disease.    Labs reviewed in EPIC    OBJECTIVE:                                                    /72 (BP Location: Left arm, Patient Position: Sitting, Cuff Size: Adult Regular)  Pulse 76  Temp 98.1  F (36.7  C) (Oral)  Ht 1.74 m (5' 8.5\")  Wt 86.5 kg (190 lb 11.2 oz)  SpO2 95%  BMI 28.57 kg/m2 Body mass index is 28.57 kg/(m^2).   GENERAL: healthy, alert and no distress, right facial droop - improving, right eye lid - able to open lid   EYES: right eye lid - improved, right lower facial droop PERRL and conjunctivae and sclerae normal  HENT: mouth without ulcers or lesions, surgical site - improved, swelling has improved, non tender  NECK: no adenopathy, no asymmetry, masses, or scars and thyroid normal to palpation  RESP: lungs clear to auscultation - no rales, rhonchi or wheezes  CV: regular rate and rhythm, normal S1 S2, no murmur, click or rub  ABDOMEN: soft, non tender,  no hepatosplenomegaly, no masses and bowel sounds normal  MS: no gross musculoskeletal defects noted, no edema  SKIN: no suspicious lesions or rashes  NEURO: Normal strength and tone, mentation intact and speech normal  PSYCH: mentation appears normal, affect normal  LYMPH: no cervical adenopathy       ASSESSMENT AND PLAN:                                                      1. Parotid abscess ( right side) - resolved s/p superficial parotidectomy on 11/2/17   - FNA 10/11/17 - grew S.salivarius   - " intra-op cx -11/2/17 - [facility]   - in hospital - he received Unasyn and D/C on Ertapenem   - s/p 2.5 weeks of Unasyn/Ertapenem      2. ESTRADA cirrhosis     3. Diabetes mellitus     4. Tobacco chewing - quit     Plan:  - condition has improved   - no further antibiotic needed  - recommend routine dental care     Plan discussed with patient     Thank You for allowing me to participate in the care of this patient    Gretel Noel MD, M.Med.Sc  Division of Infectious Diseases and International Medicine  [facility]

## 2017-12-08 NOTE — NURSING NOTE
"Chief Complaint   Patient presents with     RECHECK      follow up Parotid abscess       Initial /72 (BP Location: Left arm, Patient Position: Sitting, Cuff Size: Adult Regular)  Pulse 76  Temp 98.1  F (36.7  C) (Oral)  Ht 1.74 m (5' 8.5\")  Wt 86.5 kg (190 lb 11.2 oz)  SpO2 95%  BMI 28.57 kg/m2 Estimated body mass index is 28.57 kg/(m^2) as calculated from the following:    Height as of this encounter: 1.74 m (5' 8.5\").    Weight as of this encounter: 86.5 kg (190 lb 11.2 oz).  Medication Reconciliation: complete    "

## 2017-12-08 NOTE — MR AVS SNAPSHOT
After Visit Summary   12/8/2017    Frandy Workman    MRN: [id number]           Patient Information     Date Of Birth          1965        Visit Information        Provider Department      12/8/2017 10:00 AM Sergio Noel MD [facility] and Infectious Diseases        Today's Diagnoses     Parotid abscess s/p superficial parotidectomy and antibiotic therapy     -  1    Type 2 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, unspecified long term insulin use status (H)           Follow-ups after your visit        Follow-up notes from your care team     Return if symptoms worsen or fail to improve.      Your next 10 appointments already scheduled     Dec 08, 2017 11:15 AM CST   LAB with  LAB   [facility] ([facility])    [address] [place] [id number] [phone]           Please do not eat 10-12 hours before your appointment if you are coming in fasting for labs on lipids, cholesterol, or glucose (sugar). This does not apply to pregnant women. Water, hot tea and black coffee (with nothing added) are okay. Do not drink other fluids, diet soda or chew gum.            Dec 11, 2017  2:00 PM CST   (Arrive by 1:45 PM)   Return Visit with Natalie Russell MD   [facility] ([facility])    [address] [place] [id number] [phone]            Dec 13, 2017 10:00 AM CST   US ABDOMEN COMPLETE with UCUS1   [facility] ([facility])    [address] [place] [id number] [phone]           Please bring a list of your medicines (including vitamins, minerals and over-the-counter drugs). Also, tell your doctor about any allergies you may have. Wear comfortable clothes and leave your valuables at home.  Adults: No eating or drinking for 8 hours before  the exam. You may take medicine with a small sip of water.  Children: - Children 6+ years: No food or drink for 6 hours before exam. - Children 1-5 years: No food or drink for 4 hours before exam. - Infants, breast-fed: may have breast milk up to 2 hours before exam. - Infants, formula: may have bottle until 4 hours before exam.  Please call the Imaging Department at your exam site with any questions.            Jan 03, 2018  2:00 PM CST   (Arrive by 1:45 PM)   Evaluation with SALBADOR Mcginnis   [facility] ([facility])    [address] [place] [phone]   [phone]            Jan 24, 2018  2:00 PM CST   (Arrive by 1:45 PM)   Return Visit with Milana Malave MD   [facility] ([facility])    [address] [place] [phone]   [phone]            Feb 02, 2018  2:00 PM CST   (Arrive by 1:45 PM)   Return Visit with Asiya Morgan MD   [facility] ([facility])    [address] [place] [phone]   [phone]            Feb 21, 2018 12:00 PM CST   (Arrive by 11:45 AM)   RETURN ENDOCRINE with Jennifer Wilcox MD   [facility] ([facility])    [address] [place] [phone]   [phone]            Mar 08, 2018 12:30 PM CST   (Arrive by 12:15 PM)   Return Visit with Lamin Gonzalez DPM   [facility] ([facility])    [address] [place] [phone]   [phone]            Apr 16, 2018 12:00 PM CDT   Lab with  LAB   [facility] ([facility])    [address] [place] [phone]   [phone]            Apr 16, 2018  1:00 PM CDT   (Arrive by 12:45 PM)   Return General Liver with Santi Dotson MD   [facility]  "([facility])    [address] [facility]  3rd Floor  [facility] [address] [phone]              Who to contact     If you have questions or need follow up information about today's clinic visit or your schedule please contact [facility] AND INFECTIOUS DISEASES directly at [phone].  Normal or non-critical lab and imaging results will be communicated to you by MyChart, letter or phone within 4 business days after the clinic has received the results. If you do not hear from us within 7 days, please contact the clinic through [facility] or phone. If you have a critical or abnormal lab result, we will notify you by phone as soon as possible.  Submit refill requests through [facility] or call your pharmacy and they will forward the refill request to us. Please allow 3 business days for your refill to be completed.          Additional Information About Your Visit        [facility] Information     [facility] gives you secure access to your electronic health record. If you see a primary care provider, you can also send messages to your care team and make appointments. If you have questions, please call your primary care clinic.  If you do not have a primary care provider, please call [phone] and they will assist you.        Care EveryWhere ID     This is your Care EveryWhere ID. This could be used by other organizations to access your [facility] medical records  [id number]        Your Vitals Were     Pulse Temperature Height Pulse Oximetry BMI (Body Mass Index)       76 98.1  F (36.7  C) (Oral) 1.74 m (5' 8.5\") 95% 28.57 kg/m2        Blood Pressure from Last 3 Encounters:   12/08/17 111/72   11/20/17 104/48   11/19/17 115/55    Weight from Last 3 Encounters:   12/08/17 86.5 kg (190 lb 11.2 oz)   12/01/17 84.8 kg (187 lb)   11/22/17 89.4 kg (197 lb)              Today, you had the following     No orders found for display         Today's Medication Changes          These changes are " accurate as of: 12/8/17 10:29 AM.  If you have any questions, ask your nurse or doctor.               These medicines have changed or have updated prescriptions.        Dose/Directions    dapagliflozin 10 MG Tabs tablet   Commonly known as:  FARXIGA   This may have changed:  when to take this   Used for:  Type 2 diabetes mellitus with hyperglycemia, with long-term current use of insulin (H)        Dose:  10 mg   Take 1 tablet (10 mg) by mouth daily   Quantity:  90 tablet   Refills:  3       insulin degludec 200 UNIT/ML pen   Commonly known as:  TRESIBA   This may have changed:    - how much to take  - how to take this  - when to take this  - additional instructions   Used for:  Type 2 diabetes mellitus with hyperglycemia, with long-term current use of insulin (H)        Take 120 units daily.   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 (H)        Dose:  30 g   Take 45 mLs (30 g) by mouth 4 times daily   Quantity:  7200 mL   Refills:  11       potassium chloride SA 20 MEQ CR tablet   Commonly known as:  K-DUR/KLOR-CON M   This may have changed:  when to take this   Used for:  Hypokalemia        Dose:  20 mEq   Take 1 tablet (20 mEq) by mouth daily   Quantity:  90 tablet   Refills:  3       pravastatin 10 MG tablet   Commonly known as:  PRAVACHOL   This may have changed:    - how much to take  - when to take this   Used for:  Hyperlipidemia LDL goal <100        Dose:  20 mg   Take 2 tablets (20 mg) by mouth daily   Quantity:  90 tablet   Refills:  3       spironolactone 25 MG tablet   Commonly known as:  ALDACTONE   This may have changed:  when to take this   Used for:  Other ascites        Dose:  25 mg   Take 1 tablet (25 mg) by mouth daily   Quantity:  90 tablet   Refills:  1                Primary Care Provider Office Phone # Fax #    Natalie  Mitzi Russell [phone]       [address] [place] [address]  [facility] [address]        Equal Access to Services     MINDI RODRIGUEZ : Hadii aad ku hadaricjanette Vanessa, waadrianeluis story, jasmeet kanayanda leonard, emy wiliamin hayaan angelicajasson grace harry vuong. So [facility] [phone].    ATENCIÓN: Si habla español, tiene a hanley disposición servicios gratuitos de asistencia lingüística. Llame al [phone].    We comply with applicable federal civil rights laws and [place] laws. We do not discriminate on the basis of race, color, national origin, age, disability, sex, sexual orientation, or gender identity.            Thank you!     Thank you for choosing [facility] AND INFECTIOUS DISEASES  for your care. Our goal is always to provide you with excellent care. Hearing back from our patients is one way we can continue to improve our services. Please take a few minutes to complete the written survey that you may receive in the mail after your visit with us. Thank you!             Your Updated Medication List - Protect others around you: Learn how to safely use, store and throw away your medicines at www.disposemymeds.org.          This list is accurate as of: 12/8/17 10:29 AM.  Always use your most recent med list.                   Brand Name Dispense Instructions for use Diagnosis    ARTIFICIAL TEARS Oint     3.5 g    Apply 1 Application to eye At Bedtime    Post-operative state       blood glucose monitoring lancets     408 each    Use to test blood sugar 4 times daily or as directed.  1 box = 102 lancets    Type II diabetes mellitus (H)       blood glucose monitoring test strip    ACCU-CHEK EDINSON PLUS    400 each    Use to test blood sugar 4 times daily    Type II diabetes mellitus (H)       carvedilol 12.5 MG tablet    COREG    180 tablet    Take 1 tablet (12.5 mg) by mouth 2 times daily (with meals)    Liver cirrhosis secondary to ESTRADA (H), Secondary esophageal varices without bleeding  (H)       dapagliflozin 10 MG Tabs tablet    FARXIGA    90 tablet    Take 1 tablet (10 mg) by mouth daily    Type 2 diabetes mellitus with hyperglycemia, with long-term current use of insulin (H)       erythromycin ophthalmic ointment    ROMYCIN    3.5 g    Place 1 Application into the right eye 3 times daily    Post-operative state       hypromellose-dextran Soln ophthalmic solution     1 Bottle    Place 1 drop into the right eye every hour as needed for dry eyes Apply at least 4 times daily and as needed for dry eye    Dry eyes       insulin aspart 100 UNIT/ML injection    NovoLOG FLEXPEN    24 mL    1 unit per 4 Gms CHO at meals and snacks + correction scale of 1 unit per 25 mg/dL over 125. Average daily dose is 75 units.    Type II diabetes mellitus (H)       insulin degludec 200 UNIT/ML pen    TRESIBA    36 mL    Take 120 units daily.    Type 2 diabetes mellitus with hyperglycemia, with long-term current use of insulin (H)       insulin pen needle 32G X 4 MM    BD VIKTORIA U/F    100 each    Use as directed.    Type II diabetes mellitus (H)       lactulose 10 GM/15ML solution    CHRONULAC    7200 mL    Take 45 mLs (30 g) by mouth 4 times daily    Liver cirrhosis secondary to ESTRADA (H)       OLANZapine 2.5 MG tablet    zyPREXA    30 tablet    Take 1 tablet (2.5 mg) by mouth At Bedtime    Insomnia, unspecified type       omeprazole 40 MG capsule    priLOSEC          oxyCODONE IR 5 MG tablet    ROXICODONE    15 tablet    Take 1-2 tablets (5-10 mg) by mouth every 3 hours as needed for pain or other (Moderate to Severe)    Acute postoperative pain       potassium chloride SA 20 MEQ CR tablet    K-DUR/KLOR-CON M    90 tablet    Take 1 tablet (20 mEq) by mouth daily    Hypokalemia       pravastatin 10 MG tablet    PRAVACHOL    90 tablet    Take 2 tablets (20 mg) by mouth daily    Hyperlipidemia LDL goal <100       Propylene Glycol-Glycerin 1-0.3 % Soln    ARTIFICIAL TEARS    30 mL    Apply 4 drops to eye every 2 hours  (while awake)    Post-operative state       RA VITAMIN B-12 TR 1000 MCG Tbcr   Generic drug:  cyanocobalamin      Take 1,000 mcg by mouth every morning        rifaximin 550 MG Tabs tablet    XIFAXAN    60 tablet    Take 1 tablet (550 mg) by mouth 2 times daily    Hepatic encephalopathy (H)       spironolactone 25 MG tablet    ALDACTONE    90 tablet    Take 1 tablet (25 mg) by mouth daily    Other ascites       THERAVITE PO      Take 1 tablet by mouth every morning        VITAMIN D-3 PO      Take 2,000 Units by mouth every morning

## 2017-12-13 ENCOUNTER — RADIANT APPOINTMENT (OUTPATIENT)
Dept: ULTRASOUND IMAGING | Facility: CLINIC | Age: 52
End: 2017-12-13
Attending: INTERNAL MEDICINE
Payer: MEDICARE

## 2017-12-13 DIAGNOSIS — K74.69 OTHER CIRRHOSIS OF LIVER (H): ICD-10-CM

## 2018-01-03 ENCOUNTER — THERAPY VISIT (OUTPATIENT)
Dept: SPEECH THERAPY | Facility: CLINIC | Age: 53
End: 2018-01-03
Attending: OTOLARYNGOLOGY
Payer: MEDICARE

## 2018-01-03 DIAGNOSIS — R47.1 DYSARTHRIA: ICD-10-CM

## 2018-01-03 DIAGNOSIS — R29.810 FACIAL WEAKNESS: Primary | ICD-10-CM

## 2018-01-03 NOTE — MR AVS SNAPSHOT
After Visit Summary   1/3/2018    Frandy Workman    MRN: [id number]           Patient Information     Date Of Birth          1965        Visit Information        Provider Department      1/3/2018 2:00 PM Katie Starkey SLP  [facility]        Today's Diagnoses     Facial weakness    -  1    Dysarthria           Follow-ups after your visit        Your next 10 appointments already scheduled     Jan 11, 2018  5:30 PM CST   (Arrive by 5:15 PM)   Return Visit with Natalie Russell MD   [facility]    [address] [place] [address]   [phone]            Jan 24, 2018  2:00 PM CST   (Arrive by 1:45 PM)   Return Visit with Milana Malave MD   [facility]    [address] [place] [address]   [phone]            Feb 02, 2018  2:00 PM CST   (Arrive by 1:45 PM)   Return Visit with Asiya Morgan MD   [facility]    [address] [place] [address]   [phone]            Feb 07, 2018  2:00 PM CST   (Arrive by 1:45 PM)   Evaluation with SALBADOR Mcginnis   [facility] ([facility]    [address] [place] [address]   [phone]            Feb 21, 2018 12:00 PM CST   (Arrive by 11:45 AM)   RETURN ENDOCRINE with Jennifer Wilcox MD   [facility] ([facility]    [address] [place] [address]   [phone]            Mar 08, 2018 12:30 PM CST   (Arrive by 12:15 PM)   Return Visit with Lamin Gonzalez DPM   [facility]    [address] [place] [address]   [phone]            Apr 16, 2018 12:00 PM CDT   Lab with [facility]   [facility]    [address]  1st Floor  [facility] [id number] [phone]            Apr 16, 2018  1:00 PM CDT   (Arrive by 12:45 PM)   Return General Liver with Santi Dotson MD   [facility] ([facility])    [address]  [address]  [facility] [id number] [phone]              Who to contact     Please call your clinic at [phone] to:    Ask questions about your health    Make or cancel appointments    Discuss your medicines    Learn about your test results    Speak to your doctor   If you have compliments or concerns about an experience at your clinic, or if you wish to file a complaint, please contact [facility] Patient Relations at [phone] or email us at [email or web address]         Additional Information About Your Visit        [facility] Information     [employer] gives you secure access to your electronic health record. If you see a primary care provider, you can also send messages to your care team and make appointments. If you have questions, please call your primary care clinic.  If you do not have a primary care provider, please call [phone] and they will assist you.      Rix[employer] is an electronic gateway that provides easy, online access to your medical records. With [employer], you can request a clinic appointment, read your test results, renew a prescription or communicate with your care team.     To access your existing account, please contact your [facility] or call [phone] for assistance.        Care EveryWhere ID     This is your Care EveryWhere ID. This could be used by other organizations to access your [place] medical records  [id number]         Blood Pressure from Last 3 Encounters:   12/08/17 111/72   11/20/17 104/48   11/19/17 115/55    Weight from Last 3 Encounters:   12/08/17 86.5 kg (190 lb 11.2 oz)    12/01/17 84.8 kg (187 lb)   11/22/17 89.4 kg (197 lb)              Today, you had the following     No orders found for display         Today's Medication Changes          These changes are accurate as of: 1/3/18  7:21 PM.  If you have any questions, ask your nurse or doctor.               These medicines have changed or have updated prescriptions.        Dose/Directions    dapagliflozin 10 MG Tabs tablet   Commonly known as:  FARXIGA   This may have changed:  when to take this   Used for:  Type 2 diabetes mellitus with hyperglycemia, with long-term current use of insulin (H)        Dose:  10 mg   Take 1 tablet (10 mg) by mouth daily   Quantity:  90 tablet   Refills:  3       insulin degludec 200 UNIT/ML pen   Commonly known as:  TRESIBA   This may have changed:    - how much to take  - how to take this  - when to take this  - additional instructions   Used for:  Type 2 diabetes mellitus with hyperglycemia, with long-term current use of insulin (H)        Take 120 units daily.   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 (H)        Dose:  30 g   Take 45 mLs (30 g) by mouth 4 times daily   Quantity:  7200 mL   Refills:  11       potassium chloride SA 20 MEQ CR tablet   Commonly known as:  K-DUR/KLOR-CON M   This may have changed:  when to take this   Used for:  Hypokalemia        Dose:  20 mEq   Take 1 tablet (20 mEq) by mouth daily   Quantity:  90 tablet   Refills:  3       pravastatin 10 MG tablet   Commonly known as:  PRAVACHOL   This may have changed:    - how much to take  - when to take this   Used for:  Hyperlipidemia LDL goal <100        Dose:  20 mg   Take 2 tablets (20 mg) by mouth daily   Quantity:  90 tablet   Refills:  3       spironolactone 25 MG tablet   Commonly known as:  ALDACTONE   This may have changed:  when to take this    Used for:  Other ascites        Dose:  25 mg   Take 1 tablet (25 mg) by mouth daily   Quantity:  90 tablet   Refills:  1                Primary Care Provider Office Phone # Fax #    Natalie Mitzi Andrés Russell [phone]       [address] [place] [address]  [facility] [address]        Equal Access to Services     SUSANMAYCOL MICHAEL : Hadii aad ku hadasho Soomaali, waaxda luqadaha, qaybta kaalmada adeegyada, waxay idiin hayaan adeeg kharash la'aan . So [facility] [phone].    ATENCIÓN: Si habla español, tiene a hanley disposición servicios gratuitos de asistencia lingüística. Nguyename al [phone].    We comply with applicable federal civil rights laws and [place] laws. We do not discriminate on the basis of race, color, national origin, age, disability, sex, sexual orientation, or gender identity.            Thank you!     Thank you for choosing [facility]  for your care. Our goal is always to provide you with excellent care. Hearing back from our patients is one way we can continue to improve our services. Please take a few minutes to complete the written survey that you may receive in the mail after your visit with us. Thank you!             Your Updated Medication List - Protect others around you: Learn how to safely use, store and throw away your medicines at www.disposemymeds.org.          This list is accurate as of: 1/3/18  7:21 PM.  Always use your most recent med list.                   Brand Name Dispense Instructions for use Diagnosis    ARTIFICIAL TEARS Oint     3.5 g    Apply 1 Application to eye At Bedtime    Post-operative state       blood glucose monitoring lancets     408 each    Use to test blood sugar 4 times daily or as directed.  1 box = 102 lancets    Type II diabetes mellitus (H)       blood glucose monitoring test strip    ACCU-CHEK EDINSON PLUS    400 each    Use to test blood sugar 4 times daily    Type II diabetes mellitus (H)       carvedilol 12.5 MG tablet    COREG    180  tablet    Take 1 tablet (12.5 mg) by mouth 2 times daily (with meals)    Liver cirrhosis secondary to ESTRADA (H), Secondary esophageal varices without bleeding (H)       dapagliflozin 10 MG Tabs tablet    FARXIGA    90 tablet    Take 1 tablet (10 mg) by mouth daily    Type 2 diabetes mellitus with hyperglycemia, with long-term current use of insulin (H)       erythromycin ophthalmic ointment    ROMYCIN    3.5 g    Place 1 Application into the right eye 3 times daily    Post-operative state       hypromellose-dextran Soln ophthalmic solution     1 Bottle    Place 1 drop into the right eye every hour as needed for dry eyes Apply at least 4 times daily and as needed for dry eye    Dry eyes       insulin aspart 100 UNIT/ML injection    NovoLOG FLEXPEN    24 mL    1 unit per 4 Gms CHO at meals and snacks + correction scale of 1 unit per 25 mg/dL over 125. Average daily dose is 75 units.    Type II diabetes mellitus (H)       insulin degludec 200 UNIT/ML pen    TRESIBA    36 mL    Take 120 units daily.    Type 2 diabetes mellitus with hyperglycemia, with long-term current use of insulin (H)       insulin pen needle 32G X 4 MM    BD VIKTORIA U/F    100 each    Use as directed.    Type II diabetes mellitus (H)       lactulose 10 GM/15ML solution    CHRONULAC    7200 mL    Take 45 mLs (30 g) by mouth 4 times daily    Liver cirrhosis secondary to ESTRADA (H)       OLANZapine 2.5 MG tablet    zyPREXA    30 tablet    Take 1 tablet (2.5 mg) by mouth At Bedtime    Insomnia, unspecified type       omeprazole 40 MG capsule    priLOSEC          oxyCODONE IR 5 MG tablet    ROXICODONE    15 tablet    Take 1-2 tablets (5-10 mg) by mouth every 3 hours as needed for pain or other (Moderate to Severe)    Acute postoperative pain       potassium chloride SA 20 MEQ CR tablet    K-DUR/KLOR-CON M    90 tablet    Take 1 tablet (20 mEq) by mouth daily    Hypokalemia       pravastatin 10 MG tablet    PRAVACHOL    90 tablet    Take 2 tablets (20 mg) by  mouth daily    Hyperlipidemia LDL goal <100       Propylene Glycol-Glycerin 1-0.3 % Soln    ARTIFICIAL TEARS    30 mL    Apply 4 drops to eye every 2 hours (while awake)    Post-operative state       RA VITAMIN B-12 TR 1000 MCG Tbcr   Generic drug:  cyanocobalamin      Take 1,000 mcg by mouth every morning        rifaximin 550 MG Tabs tablet    XIFAXAN    60 tablet    Take 1 tablet (550 mg) by mouth 2 times daily    Hepatic encephalopathy (H)       spironolactone 25 MG tablet    ALDACTONE    90 tablet    Take 1 tablet (25 mg) by mouth daily    Other ascites       THERAVITE PO      Take 1 tablet by mouth every morning        VITAMIN D-3 PO      Take 2,000 Units by mouth every morning

## 2018-01-10 NOTE — PROGRESS NOTES
[facility]          OUTPATIENT SPEECH LANGUAGE PATHOLOGY FACIAL PARALYSIS EVALUATION  PLAN OF TREATMENT FOR OUTPATIENT REHABILITATION  (COMPLETE FOR INITIAL CLAIMS ONLY)  Patient's Last Name, First Name, M.I.  YOB: 1965  JiFrandy  OLIVER                        Provider s Name: [facility] Medical Record No.  [id number]     Onset Date: 11/02/17    Start of Care Date: 01/03/18   Type:     ___PT  ___OT   _X_SLP    Medical Diagnosis: Facial Weakness, Dysarthria     Speech Language Pathology Diagnosis:  Dysarthria, Oral Stage Dysphagia, Dysarthria, Facial Weakness    Visits from SOC: 1    _________________________________________________________________________________  Plan of Treatment/Functional Goals:   Planned Therapy Interventions: Facial Therapy, Improve Facial Tone and Function, Improve Speech Intelligibilty           Goals   1. Goal Identifier: Prevention of Corneal Abrasion       Goal Description: Patient will demonstrate complete eye closure on therapist evaluation.        Target Date: 03/03/18   2. Goal Identifier: Facial Function for Nonverbal Communication, Speech and Oral Stage Swallowing       Goal Description: Patient will demonstrate a 10 point gain on her Facial Grading Score, per therapist judgement, reflecting gains in orofacial resting tone, voluntary movement and minimization of synkinesis if present.       Target Date: 03/03/18   3. Goal Identifier: Speech       Goal Description: Patient will report a 25% improvement in ability to be understood over the phone in comparison to status noted on this date of evaluation.       Target Date: 03/03/18                   Katie Starkey, SLP       I CERTIFY THE NEED FOR THESE SERVICES FURNISHED UNDER        THIS PLAN OF TREATMENT AND WHILE UNDER MY CARE     (Physician co-signature of this document indicates review and  "certification of the therapy plan).                    Certification Date From:   01/03/18  Certification Date To:  03/31/18             Referring Physician: Asiya Morgan MD    Initial Assessment        See Epic Evaluation Start of Care Date: 01/03/18                   Speech Pathology/Facial Paralysis Evaluation - 01/03/18 1300   Visit Type   Visit Type Initial   Patient Type   Patient Type Adult   General Patient Information   Start Of Care Date 01/03/18   Referring Physician Asiya Morgan MD   Orders Eval And Treat   Orders Date 12/01/17  (and 12/04/17)   Medical Diagnosis Facial Weakness; Dysarthria   Onset Of Illness/injury Or Date Of Surgery 11/02/17   Surgical/Medical History Reviewed Yes   Pertinent History Of Current Problem Per chart notes of Dr. Morgan, \"11/2/2017 for a parotidectomy. Intraoperatively there was expression of purulence from the mass, intraoperative cytology and frozen sections were consistent with an abscess. The abscess was encasing the superior division of the facial nerve. The nerve was preserved in the OR. Cultures showed strep that was penicillin sensitive. Postoperatively ID was consulted and recommended a prolonged course of IV antibiotics.\"  Per patient, \"In Sept, I had some dental work for broken teeth and what happened is, a lump appeared. They did a CT exam and they told me to see Dr. Dallas. They took out the nerve, the growth. Since that time they did a second strategy, where they tried to reconstruct to hold my face up. I've been doing pretty good, but my right eye constantly tears, when I move my m outh things go sideways to the right, my ear is constantly numb, there's tingling already the right side of my face from the forehead to the neck, because of the watering my vision on the right gets bad. I get a lot of stinging in my eye socket when I lie down. My first surgery was Oct. 3rd and the second surgery was aabout a week later. I'm using drops 4x/day " "(recommended use before bed to see if this helps with the stinging and recommended seeing his opthalmologist). I'm extremely light sensitive.\"     Previous Treatment None   General Health Diabetes;Surgery;Allergies;Headaches  (Liver Disease)   Diagnostic Tests CT scan;Other  (biopsies)   Occupation Disabled   Sensory Changes Tingling, numbness   Pain Description Pain in eye area and to touch, patient characterizes it as moderate.   Hearing Changes None   Eye Problems Dry eye   Oral Habits Clenching;Unilateral chewing  (Removes partial on right for eating, front tooth)   Patient's Concerns tearing;difficulty eating;difficulty speaking ;difficulty being understood over the phone;other (comment)  (food pocketing, reduced tolerance for spicy foods)   Patient/Family Goals Improved above   Evaluation Results: Resting Tone and Symmetry/Oral status   Type of Eyelid Surgery  Gold weight;Tarsorraphy   Nasolabial Fold  Less Pronounced   Lips  - Type of Lip Tone  Drooped  (Slightly)   Chin  - Type of Chin Tone  Drooped  (Slightly)   Type of Forehead Wrinkles Less   Type of Eye Wrinkles Less   Type of Cheek/Mouth Wrinkles Less   Evaluation Results: Forehead Elevation   Forehead Strength Rating % 40%   Forehead General Severity of Synkinesis   none   Evaluation Results: Minimal Effort Eye Closure    Complete No  (Lacks 1.5 mm)   General Severity of Synkinesis   none   Evaluation Results: Open Mouth Smile    Open Smile Strength Rating % 5%   Open Smile General Severity of Synkinesis   none   Evaluation Results: Closed Mouth Smile    Closed Mouth Smile Strength Rating % 50%   Closed Mouth Smile General Severity of Synkinesis   none   Evaluation Results: Snarl   Snarl Strength Rating % 20%   Snarl General Severity of Synkinesis   none   Evaluation Results: Smirk   Smirk Strength rating % 70%   Smirk General Severity of Synkinesis   none   Evaluation Results: Pucker   Strength Rating % 80%   General Severity of Synkinesis   none "   Evaluation Results: Compression   Strength Rating % 50%   General Severity of Synkinesis   none   Evaluation Results: Contraction   Strength Rating % 70%   General Severity of Synkinesis   none   Evaluation Results: Lower Lip Depression   Strength Rating % 90%   General Severity of Synkinesis   none   Evaluation Results: Tongue Movement   Tongue Protrusion Deviates to right   Elevation/Depression Extraorally Deviates to right on elevation;Deviates to right on depression   Elevation/Depression Intraorally Deviates to right on elevation;Deviates to right on depression   Presence of Synkinesis with Lingual Movements None   Evaluation Results: Speech Function   Evaluation Results: Speech Function Deviation of lips during speech to left, stronger side;Reduced lip movement on the right   General Severity of Synkinesis with Sound-Lip Rounding None   General Severity of Synkinesis with Sound-Lip Pressure none   General Therapy Interventions   Planned Therapy Interventions Facial Therapy;Improve Facial Tone and Function;Improve Speech Intelligibilty  Clinical Swallow Evaluation to be carried out if oral stage swallowing difficulties persist.   Clinical Impressions   Criteria for Skilled Therapeutic Interventions Met yes;treatment indicated   Facial Grading Score 50.9/100   Communication Diagnosis Dysarthria   Swallowing Diagnosis Oral Stage Dysphagia  (Per patient report of swallowing issues noted above.)   Rehab Potential good to achieve stated therapy goal(s)   Therapy Frequency  (1 x/month)   Predicted Duration of Therapy Intervention (days/weeks) 10 months or less as indicated   Risks and Benefits of Treatment have been explained yes   Patient, family and/or staff in agreement yes   Facial Paralysis Goals   Facial Paralysis Goals 1;2;3   Facial Paralysis Goal 1   Goal Identifier Prevention of Corneal Abrasion   Goal Description Patient will demonstrate complete eye closure on therapist evaluation.    Target Date  03/03/18   Facial Paralysis Goal 2   Goal Identifier Facial Function for Nonverbal Communication, Speech and Oral Stage Swallowing   Goal Description Patient will demonstrate a 10 point gain on her Facial Grading Score, per therapist judgement, reflecting gains in orofacial resting tone, voluntary movement and minimization of synkinesis if present.   Target Date 03/03/18   Facial Paralysis Goal 3   Goal Identifier Speech   Goal Description Patient will report a 25% improvement in ability to be understood over the phone in comparison to status noted on this date of evaluation.   Target Date 03/03/18   Education Assessment   Preferred Learning Style Listening;Reading;Demonstration;Pictures/video   Total Evaluation Time   Total Evaluation Time 35 minutes

## 2018-01-20 DIAGNOSIS — E11.9 TYPE II DIABETES MELLITUS (H): ICD-10-CM

## 2018-01-22 NOTE — TELEPHONE ENCOUNTER
insulin aspart ([facility]   Last Written Prescription Date:  1/16/17  Last Fill Quantity: 24ml,   # refills: 11  Last Office Visit :6/9/17  Future Office visit:  2/8/18

## 2018-01-24 ENCOUNTER — OFFICE VISIT (OUTPATIENT)
Dept: OTOLARYNGOLOGY | Facility: CLINIC | Age: 53
End: 2018-01-24
Payer: MEDICARE

## 2018-01-24 VITALS — WEIGHT: 182 LBS | HEIGHT: 67 IN | BODY MASS INDEX: 28.56 KG/M2

## 2018-01-24 DIAGNOSIS — H57.819 BROW PTOSIS: Primary | ICD-10-CM

## 2018-01-24 DIAGNOSIS — H02.231 PARALYTIC LAGOPHTHALMOS OF RIGHT UPPER EYELID: ICD-10-CM

## 2018-01-24 RX ORDER — TAMSULOSIN HYDROCHLORIDE 0.4 MG/1
CAPSULE ORAL
Refills: 2 | COMMUNITY
Start: 2017-12-17 | End: 2018-10-15

## 2018-01-24 ASSESSMENT — PAIN SCALES - GENERAL: PAINLEVEL: MODERATE PAIN (5)

## 2018-01-24 NOTE — PROGRESS NOTES
Facial Plastic and Reconstructive Surgery    Frandy Workman presents today /sp right superficial parotidectomy for complex abscess.  His facial nerve was preserved, but the frontal branch required significant dissection.   He is s/p upper eyelid weight with lower lid tarsal strip.    He is doing better and feels like his facial function is improving.  He describes tearing that primarily seems related to aberrant production recovery than over flow epiphora, although he continues to have a component of this.  His eye is comfortable and he denies any visual problems.      He has motion in every branch of the right face, with greater strength in the lower division, but with visible improved movement in the upper division. He has right medial frontalis and  contraction. He has full excursion and closure of the upper eyelid with a good position and placement of the weight. His lid margin is a little lower on this side than the left, but not obstructive.  His lower lid is in improved position, and the punctum is in contact with the globe and lacrimal pool. He continues to have some laxity however with the lower lid just off of the lower limbus.  He has some orbicularis oculi twitching, but this is weak and less functional than the rest of his face.   He has some temporal brow ptosis.    Assessment and Plan:    Frandy Workman demonstrates improvement in facial function, but continues to be weak at the brow and orbicularis oculi. There is notable innervation of those muscles on exam however. I would expect his function to continue to improve, perhaps to the point where he will no longer need the eyelid weight.   He is to continue therapy with Lynn. I will see him back in 3 months.    I spent a total of 15 minutes face-to-face with Frandy Workman during today's office visit.  Over 50% of this time was spent counseling the patient and/or coordinating care regarding right facial weakness.  See note for  details.

## 2018-01-24 NOTE — MR AVS SNAPSHOT
After Visit Summary   1/24/2018    Frandy Workman    MRN: [id number]           Patient Information     Date Of Birth          1965        Visit Information        Provider Department      1/24/2018 2:00 PM Milana Malave MD [facility]        Today's Diagnoses     Brow ptosis    -  1    Paralytic lagophthalmos of right upper eyelid           Follow-ups after your visit        Your next 10 appointments already scheduled     Jan 24, 2018  2:00 PM CST   (Arrive by 1:45 PM)   Return Visit with Milana Malave MD   [facility] ([facility])    [address] [place] [address]   [phone]            Jan 29, 2018  2:00 PM CST   (Arrive by 1:45 PM)   Return Visit with Asiya Morgan MD   [facility] ([facility])    [address] [place] [address]   [phone]            Feb 07, 2018  2:00 PM CST   (Arrive by 1:45 PM)   Evaluation with Katie Starkey, SLP   [facility] Re[facility] ([facility])    [address] [place] [id number] [phone]            Feb 08, 2018  2:00 PM CST   (Arrive by 1:45 PM)   Return Visit with Natalie Russell MD   [facility] ([facility])    [address] [place] [address]   [phone]            Feb 21, 2018 12:00 PM CST   (Arrive by 11:45 AM)   RETURN ENDOCRINE with Jennifer Wilcox MD   [facility] Endocrin[facility] ([facility])    [address] [place] [id number] [phone]            Mar 08, 2018 12:30 PM CST   (Arrive by 12:15 PM)   Return Visit with Lamin Gonzalez DPM   [facility] ([facility])    [address] [place] [id number] [phone]             Apr 16, 2018 12:00 PM CDT   Lab with  LAB    [facility] ([facility])    [address]  1st Floor  [facility] [id number] [phone]            Apr 16, 2018  1:00 PM CDT   (Arrive by 12:45 PM)   Return General Liver with Santi Dotson MD   [facility] Hepatology ([facility])    [address]  [address]  [facility] [id number] [phone]            May 16, 2018  2:00 PM CDT   (Arrive by 1:45 PM)   Return Visit with Milana Malave MD   [facility] ([facility])    [address]  4th Floor  [facility] [address] [phone]              Who to contact     Please call your clinic at [phone] to:    Ask questions about your health    Make or cancel appointments    Discuss your medicines    Learn about your test results    Speak to your doctor   If you have compliments or concerns about an experience at your clinic, or if you wish to file a complaint, please contact [facility] Patient Relations at [phone] or email us at [email or web address]         Additional Information About Your Visit        [employer] Information     [employer] gives you secure access to your electronic health record. If you see a primary care provider, you can also send messages to your care team and make appointments. If you have questions, please call your primary care clinic.  If you do not have a primary care provider, please call [phone] and they will assist you.      [employer] is an electronic gateway that provides easy, online access to your medical records. With [employer], you can request a clinic appointment, read your test results, renew a prescription or communicate with your care team.     To access your existing account, please contact your [facility] or call [phone] for assistance.        Care EveryWhere ID     This  "is your Care EveryWhere ID. This could be used by other organizations to access your [facility] medical records  [id number]        Your Vitals Were     Height BMI (Body Mass Index)                1.7 m (5' 6.93\") 28.57 kg/m2           Blood Pressure from Last 3 Encounters:   12/08/17 111/72   11/20/17 104/48   11/19/17 115/55    Weight from Last 3 Encounters:   01/24/18 82.6 kg (182 lb)   12/08/17 86.5 kg (190 lb 11.2 oz)   12/01/17 84.8 kg (187 lb)              Today, you had the following     No orders found for display         Today's Medication Changes          These changes are accurate as of 1/24/18  1:40 PM.  If you have any questions, ask your nurse or doctor.               These medicines have changed or have updated prescriptions.        Dose/Directions    dapagliflozin 10 MG Tabs tablet   Commonly known as:  FARXIGA   This may have changed:  when to take this   Used for:  Type 2 diabetes mellitus with hyperglycemia, with long-term current use of insulin (H)        Dose:  10 mg   Take 1 tablet (10 mg) by mouth daily   Quantity:  90 tablet   Refills:  3       insulin degludec 200 UNIT/ML pen   Commonly known as:  TRESIBA   This may have changed:    - how much to take  - how to take this  - when to take this  - additional instructions   Used for:  Type 2 diabetes mellitus with hyperglycemia, with long-term current use of insulin (H)        Take 120 units daily.   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 (H)        Dose:  30 g   Take 45 mLs (30 g) by mouth 4 times daily   Quantity:  7200 mL   Refills:  11       potassium chloride SA 20 MEQ CR tablet   Commonly known as:  K-DUR/KLOR-CON M   This may have changed:  when to take this   Used for:  Hypokalemia        Dose:  20 mEq   Take 1 tablet (20 mEq) by mouth daily   Quantity:  90 " tablet   Refills:  3       pravastatin 10 MG tablet   Commonly known as:  PRAVACHOL   This may have changed:    - how much to take  - when to take this   Used for:  Hyperlipidemia LDL goal <100        Dose:  20 mg   Take 2 tablets (20 mg) by mouth daily   Quantity:  90 tablet   Refills:  3       spironolactone 25 MG tablet   Commonly known as:  ALDACTONE   This may have changed:  when to take this   Used for:  Other ascites        Dose:  25 mg   Take 1 tablet (25 mg) by mouth daily   Quantity:  90 tablet   Refills:  1                Primary Care Provider Office Phone # Fax #    Natalie Cartagena Andrés Russell [phone]       [address] [facility] [address] [place] [address]        Equal Access to Services     MINDI RODRIGUEZ : Amelia Vanessa, darío story, qajohn kaalmaluis valle, emy vuong. So [facility] [phone].    ATENCIÓN: Si habla español, tiene a hanley disposición servicios gratuitos de asistencia lingüística. Llame al [phone].    We comply with applicable federal civil rights laws and [place] laws. We do not discriminate on the basis of race, color, national origin, age, disability, sex, sexual orientation, or gender identity.            Thank you!     Thank you for choosing [facility] E[facility] TH[facility]  for your care. Our goal is always to provide you with excellent care. Hearing back from our patients is one way we can continue to improve our services. Please take a few minutes to complete the written survey that you may receive in the mail after your visit with us. Thank you!             Your Updated Medication List - Protect others around you: Learn how to safely use, store and throw away your medicines at www.disposemymeds.org.          This list is accurate as of 1/24/18  1:40 PM.  Always use your most recent med list.                   Brand Name Dispense Instructions for use Diagnosis    ARTIFICIAL TEARS Oint     3.5 g    Apply 1  Application to eye At Bedtime    Post-operative state       blood glucose monitoring lancets     408 each    Use to test blood sugar 4 times daily or as directed.  1 box = 102 lancets    Type II diabetes mellitus (H)       blood glucose monitoring test strip    ACCU-CHEK EDINSON PLUS    400 each    Use to test blood sugar 4 times daily    Type II diabetes mellitus (H)       carvedilol 12.5 MG tablet    COREG    180 tablet    Take 1 tablet (12.5 mg) by mouth 2 times daily (with meals)    Liver cirrhosis secondary to ESTRADA (H), Secondary esophageal varices without bleeding (H)       dapagliflozin 10 MG Tabs tablet    FARXIGA    90 tablet    Take 1 tablet (10 mg) by mouth daily    Type 2 diabetes mellitus with hyperglycemia, with long-term current use of insulin (H)       erythromycin ophthalmic ointment    ROMYCIN    3.5 g    Place 1 Application into the right eye 3 times daily    Post-operative state       hypromellose-dextran Soln ophthalmic solution     1 Bottle    Place 1 drop into the right eye every hour as needed for dry eyes Apply at least 4 times daily and as needed for dry eye    Dry eyes       insulin aspart 100 UNIT/ML injection    NovoLOG FLEXPEN    15 mL    1 unit per 4 Gms CHO at meals and snacks + correction scale of 1 unit per 25 mg/dL over 125. Average daily dose is 75 units    Type II diabetes mellitus (H)       insulin degludec 200 UNIT/ML pen    TRESIBA    36 mL    Take 120 units daily.    Type 2 diabetes mellitus with hyperglycemia, with long-term current use of insulin (H)       insulin pen needle 32G X 4 MM    BD VIKTORIA U/F    100 each    Use as directed.    Type II diabetes mellitus (H)       lactulose 10 GM/15ML solution    CHRONULAC    7200 mL    Take 45 mLs (30 g) by mouth 4 times daily    Liver cirrhosis secondary to ESTRADA (H)       OLANZapine 2.5 MG tablet    zyPREXA    30 tablet    Take 1 tablet (2.5 mg) by mouth At Bedtime    Insomnia, unspecified type       omeprazole 40 MG capsule     priLOSEC          oxyCODONE IR 5 MG tablet    ROXICODONE    15 tablet    Take 1-2 tablets (5-10 mg) by mouth every 3 hours as needed for pain or other (Moderate to Severe)    Acute postoperative pain       potassium chloride SA 20 MEQ CR tablet    K-DUR/KLOR-CON M    90 tablet    Take 1 tablet (20 mEq) by mouth daily    Hypokalemia       pravastatin 10 MG tablet    PRAVACHOL    90 tablet    Take 2 tablets (20 mg) by mouth daily    Hyperlipidemia LDL goal <100       Propylene Glycol-Glycerin 1-0.3 % Soln    ARTIFICIAL TEARS    30 mL    Apply 4 drops to eye every 2 hours (while awake)    Post-operative state       RA VITAMIN B-12 TR 1000 MCG Tbcr   Generic drug:  cyanocobalamin      Take 1,000 mcg by mouth every morning        rifaximin 550 MG Tabs tablet    XIFAXAN    60 tablet    Take 1 tablet (550 mg) by mouth 2 times daily    Hepatic encephalopathy (H)       spironolactone 25 MG tablet    ALDACTONE    90 tablet    Take 1 tablet (25 mg) by mouth daily    Other ascites       tamsulosin 0.4 MG capsule    FLOMAX          THERAVITE PO      Take 1 tablet by mouth every morning        VITAMIN D-3 PO      Take 2,000 Units by mouth every morning

## 2018-01-24 NOTE — LETTER
1/24/2018       RE: Frandy Workman  [address]   [place] [address]     Dear Colleague,    Thank you for referring your patient, Frandy Workman, to the [facility] at [facility]. Please see a copy of my visit note below.    Facial Plastic and Reconstructive Surgery    Frandy Workman presents today /sp right superficial parotidectomy for complex abscess.  His facial nerve was preserved, but the frontal branch required significant dissection.   He is s/p upper eyelid weight with lower lid tarsal strip.    He is doing better and feels like his facial function is improving.  He describes tearing that primarily seems related to aberrant production recovery than over flow epiphora, although he continues to have a component of this.  His eye is comfortable and he denies any visual problems.      He has motion in every branch of the right face, with greater strength in the lower division, but with visible improved movement in the upper division. He has right medial frontalis and  contraction. He has full excursion and closure of the upper eyelid with a good position and placement of the weight. His lid margin is a little lower on this side than the left, but not obstructive.  His lower lid is in improved position, and the punctum is in contact with the globe and lacrimal pool. He continues to have some laxity however with the lower lid just off of the lower limbus.  He has some orbicularis oculi twitching, but this is weak and less functional than the rest of his face.   He has some temporal brow ptosis.    Assessment and Plan:    Frandy Workman demonstrates improvement in facial function, but continues to be weak at the brow and orbicularis oculi. There is notable innervation of those muscles on exam however. I would expect his function to continue to improve, perhaps to the point where he will no longer need the eyelid weight.   He is to continue  therapy with Lynn. I will see him back in 3 months.    I spent a total of 15 minutes face-to-face with Frandy Workman during today's office visit.  Over 50% of this time was spent counseling the patient and/or coordinating care regarding right facial weakness.  See note for details.      Again, thank you for allowing me to participate in the care of your patient.      Sincerely,    Milana Malave MD

## 2018-01-24 NOTE — Clinical Note
Manny Braden I believe you see Miller on Monday. I noted movement in all branches today although upper division is still weak. He has improved function for sure. Lower lid still a little low, but should improve with improved tone. Milana

## 2018-01-24 NOTE — NURSING NOTE
"Chief Complaint   Patient presents with     RECHECK     2 month follow up     Height 1.7 m (5' 6.93\"), weight 82.6 kg (182 lb).    Eduardo Castro LPN    "

## 2018-01-29 ENCOUNTER — OFFICE VISIT (OUTPATIENT)
Dept: OTOLARYNGOLOGY | Facility: CLINIC | Age: 53
End: 2018-01-29
Payer: MEDICARE

## 2018-01-29 VITALS — BODY MASS INDEX: 26.96 KG/M2 | HEIGHT: 69 IN | WEIGHT: 182 LBS

## 2018-01-29 DIAGNOSIS — K11.3 PAROTID ABSCESS: Primary | ICD-10-CM

## 2018-01-29 PROBLEM — Z71.89 ADVANCED DIRECTIVES, COUNSELING/DISCUSSION: Chronic | Status: RESOLVED | Noted: 2017-03-31 | Resolved: 2018-01-29

## 2018-01-29 ASSESSMENT — PAIN SCALES - GENERAL: PAINLEVEL: SEVERE PAIN (6)

## 2018-01-29 NOTE — MR AVS SNAPSHOT
After Visit Summary   1/29/2018    Frandy Workman    MRN: [id number]           Patient Information     Date Of Birth          1965        Visit Information        Provider Department      1/29/2018 2:00 PM Asiya Morgan MD [facility] Ear Nose and Throat        Care Instructions    1. Please follow-up in clinic as needed with Dr. Morgan  2. Please call the ENT clinic with any questions,concerns, new or worsening symptoms.    -Clinic number is [phone]   - Gay's direct line (Dr. Morgan's nurse) [phone]    3. The number for the school of dentistry is [phone]          Follow-ups after your visit        Your next 10 appointments already scheduled     Feb 07, 2018  2:00 PM CST   (Arrive by 1:45 PM)   Evaluation with SALBADOR Mcginnis   [facility] Rehab ([facility])    [address] [place] [id number] [phone]            Feb 08, 2018  2:00 PM CST   (Arrive by 1:45 PM)   Return Visit with Natalie Russell MD   [facility] Primary Care Clinic ([facility])    [address] [place] [id number] [phone]            Feb 21, 2018 12:00 PM CST   (Arrive by 11:45 AM)   RETURN ENDOCRINE with Jennifer Wilcox MD   [facility] Endocrinology ([facility])    [address] [place] [id number] [phone]            Mar 08, 2018 12:30 PM CST   (Arrive by 12:15 PM)   Return Visit with Lamin Gonzalez DPM   [facility] Endocrinology ([facility])    [address] [place] [id number] [phone]            Apr 16, 2018 12:00 PM CDT   Lab with  LAB   [facility] Lab ([facility])    [address] [place] [id number] [phone]            Apr 16, 2018  1:00 PM CDT   (Arrive by 12:45 PM)   Return General Liver with Santi  "MD BRYANT Rosas [facility] ([facility])    [address]  [address]  [facility] [address] [phone]            May 16, 2018  2:00 PM CDT   (Arrive by 1:45 PM)   Return Visit with Milana Malave MD   [facility] Ear Nose and Throat ([facility])    [address]  [address]th Floor  [facility] [address] [phone]              Who to contact     Please call your clinic at [phone] to:    Ask questions about your health    Make or cancel appointments    Discuss your medicines    Learn about your test results    Speak to your doctor   If you have compliments or concerns about an experience at your clinic, or if you wish to file a complaint, please contact [facility] Patient Relations at [phone] or email us at [email or web address]         Additional Information About Your Visit        Shoot it! Information     Shoot it! gives you secure access to your electronic health record. If you see a primary care provider, you can also send messages to your care team and make appointments. If you have questions, please call your primary care clinic.  If you do not have a primary care provider, please call [phone] and they will assist you.      Shoot it! is an electronic gateway that provides easy, online access to your medical records. With Shoot it!, you can request a clinic appointment, read your test results, renew a prescription or communicate with your care team.     To access your existing account, please contact your [facility] or call [phone] for assistance.        Care EveryWhere ID     This is your Care EveryWhere ID. This could be used by other organizations to access your [place] medical records  [id number]        Your Vitals Were     Height BMI (Body Mass Index)                1.74 m (5' 8.5\") 27.27 kg/m2           Blood Pressure from Last 3 " Encounters:   12/08/17 111/72   11/20/17 104/48   11/19/17 115/55    Weight from Last 3 Encounters:   01/29/18 82.6 kg (182 lb)   01/24/18 82.6 kg (182 lb)   12/08/17 86.5 kg (190 lb 11.2 oz)              Today, you had the following     No orders found for display         Today's Medication Changes          These changes are accurate as of 1/29/18  2:48 PM.  If you have any questions, ask your nurse or doctor.               These medicines have changed or have updated prescriptions.        Dose/Directions    dapagliflozin 10 MG Tabs tablet   Commonly known as:  FARXIGA   This may have changed:  when to take this   Used for:  Type 2 diabetes mellitus with hyperglycemia, with long-term current use of insulin (H)        Dose:  10 mg   Take 1 tablet (10 mg) by mouth daily   Quantity:  90 tablet   Refills:  3       insulin degludec 200 UNIT/ML pen   Commonly known as:  TRESIBA   This may have changed:    - how much to take  - how to take this  - when to take this  - additional instructions   Used for:  Type 2 diabetes mellitus with hyperglycemia, with long-term current use of insulin (H)        Take 120 units daily.   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 (H)        Dose:  30 g   Take 45 mLs (30 g) by mouth 4 times daily   Quantity:  7200 mL   Refills:  11       potassium chloride SA 20 MEQ CR tablet   Commonly known as:  K-DUR/KLOR-CON M   This may have changed:  when to take this   Used for:  Hypokalemia        Dose:  20 mEq   Take 1 tablet (20 mEq) by mouth daily   Quantity:  90 tablet   Refills:  3       pravastatin 10 MG tablet   Commonly known as:  PRAVACHOL   This may have changed:    - how much to take  - when to take this   Used for:  Hyperlipidemia LDL goal <100        Dose:  20 mg   Take 2 tablets (20 mg) by mouth daily    Quantity:  90 tablet   Refills:  3       spironolactone 25 MG tablet   Commonly known as:  ALDACTONE   This may have changed:  when to take this   Used for:  Other ascites        Dose:  25 mg   Take 1 tablet (25 mg) by mouth daily   Quantity:  90 tablet   Refills:  1                Primary Care Provider Office Phone # Fax #    Natalie Cartagena Andrés Russell [phone]       [address]  [facility] [address]        Equal Access to Services     MINDI RODRIGUEZ AH: Hadii aad ku hadasho Soomaali, waaxda luqadaha, qaybta kaalmada adeegyada, waxay idiin hayaan adeeg kharash la'brigitte . [facility] [phone].    ATENCIÓN: Si habla español, tiene a hanley disposición servicios gratuitos de asistencia lingüística. [facility] [phone].    We comply with applicable federal civil rights laws and [place] laws. We do not discriminate on the basis of race, color, national origin, age, disability, sex, sexual orientation, or gender identity.            Thank you!     Thank you for choosing [facility] EAR NOSE AND THROAT  for your care. Our goal is always to provide you with excellent care. Hearing back from our patients is one way we can continue to improve our services. Please take a few minutes to complete the written survey that you may receive in the mail after your visit with us. Thank you!             Your Updated Medication List - Protect others around you: Learn how to safely use, store and throw away your medicines at www.disposemymeds.org.          This list is accurate as of 1/29/18  2:48 PM.  Always use your most recent med list.                   Brand Name Dispense Instructions for use Diagnosis    ARTIFICIAL TEARS Oint     3.5 g    Apply 1 Application to eye At Bedtime    Post-operative state       blood glucose monitoring lancets     408 each    Use to test blood sugar 4 times daily or as directed.  1 box = 102 lancets    Type II diabetes mellitus (H)       blood glucose monitoring test strip     ACCU-CHEK EDINSON PLUS    400 each    Use to test blood sugar 4 times daily    Type II diabetes mellitus (H)       carvedilol 12.5 MG tablet    COREG    180 tablet    Take 1 tablet (12.5 mg) by mouth 2 times daily (with meals)    Liver cirrhosis secondary to ESTRADA (H), Secondary esophageal varices without bleeding (H)       dapagliflozin 10 MG Tabs tablet    FARXIGA    90 tablet    Take 1 tablet (10 mg) by mouth daily    Type 2 diabetes mellitus with hyperglycemia, with long-term current use of insulin (H)       erythromycin ophthalmic ointment    ROMYCIN    3.5 g    Place 1 Application into the right eye 3 times daily    Post-operative state       hypromellose-dextran Soln ophthalmic solution     1 Bottle    Place 1 drop into the right eye every hour as needed for dry eyes Apply at least 4 times daily and as needed for dry eye    Dry eyes       insulin aspart 100 UNIT/ML injection    NovoLOG FLEXPEN    15 mL    1 unit per 4 Gms CHO at meals and snacks + correction scale of 1 unit per 25 mg/dL over 125. Average daily dose is 75 units    Type II diabetes mellitus (H)       insulin degludec 200 UNIT/ML pen    TRESIBA    36 mL    Take 120 units daily.    Type 2 diabetes mellitus with hyperglycemia, with long-term current use of insulin (H)       insulin pen needle 32G X 4 MM    BD VIKTORIA U/F    100 each    Use as directed.    Type II diabetes mellitus (H)       lactulose 10 GM/15ML solution    CHRONULAC    7200 mL    Take 45 mLs (30 g) by mouth 4 times daily    Liver cirrhosis secondary to ESTRADA (H)       OLANZapine 2.5 MG tablet    zyPREXA    30 tablet    Take 1 tablet (2.5 mg) by mouth At Bedtime    Insomnia, unspecified type       omeprazole 40 MG capsule    priLOSEC          potassium chloride SA 20 MEQ CR tablet    K-DUR/KLOR-CON M    90 tablet    Take 1 tablet (20 mEq) by mouth daily    Hypokalemia       pravastatin 10 MG tablet    PRAVACHOL    90 tablet    Take 2 tablets (20 mg) by mouth daily    Hyperlipidemia LDL  goal <100       Propylene Glycol-Glycerin 1-0.3 % Soln    ARTIFICIAL TEARS    30 mL    Apply 4 drops to eye every 2 hours (while awake)    Post-operative state       RA VITAMIN B-12 TR 1000 MCG Tbcr   Generic drug:  cyanocobalamin      Take 1,000 mcg by mouth every morning        rifaximin 550 MG Tabs tablet    XIFAXAN    60 tablet    Take 1 tablet (550 mg) by mouth 2 times daily    Hepatic encephalopathy (H)       spironolactone 25 MG tablet    ALDACTONE    90 tablet    Take 1 tablet (25 mg) by mouth daily    Other ascites       tamsulosin 0.4 MG capsule    FLOMAX          THERAVITE PO      Take 1 tablet by mouth every morning        VITAMIN D-3 PO      Take 2,000 Units by mouth every morning

## 2018-01-29 NOTE — NURSING NOTE
"Chief Complaint   Patient presents with     RECHECK     follow up     Height 1.74 m (5' 8.5\"), weight 82.6 kg (182 lb).    Eduardo Castro LPN    "

## 2018-01-29 NOTE — PATIENT INSTRUCTIONS
1. Please follow-up in clinic as needed with Dr. Morgan  2. Please call the ENT clinic with any questions,concerns, new or worsening symptoms.    -Clinic number is [phone]   - Gay's direct line (Dr. Morgan's nurse) [phone]    3. The number for the school of dentistry is [phone]

## 2018-01-29 NOTE — LETTER
1/29/2018       RE: Frandy Workman  [address]  [facility] [address]     Dear Colleague,    Thank you for referring your patient, Frandy Workman, to the [facility] EAR NOSE AND TH[facility]AT at [facility] acute hospital. Please see a copy of my visit note below.    Dear Dr. Rueda:    I had the pleasure of seeing Frandy Workman in follow-up today at the [facility] Otolaryngology Clinic.     History of Present Illness:   Frandy Workman is a 52-year-old gentleman who was initially referred for evaluation of a right parotid lesion. He was evaluated by Dr. Rueda who obtained a CT scan which showed a mass of the right parotid region. The patient was having significant pain associated with the mass. He underwent an FNA in clinic that showed inflammatory cells and no malignancy. He was placed on a course of augmentin and sent for image guided FNA of the mass. The mass again was nondiagnostic with just inflammatory cells but the mass appeared to have increased in size on the U/S. His case was discussed at tumor board and the recommendation was for surgical excision. He was taken to the OR on 11/2/2017 for a parotidectomy. Intraoperatively there was expression of purulence from the mass, intraoperative cytology and frozen sections were consistent with an abscess. The abscess was encasing the superior division of the facial nerve. The nerve was preserved in the OR. Cultures showed strep that was penicillin sensitive. Postoperatively ID was consulted and recommended a prolonged course of IV antibiotics. He was discharged with a PICC and ertapenem and completed a prolonged course.  He had eyelid weight placement and a lateral tarsal strip by Dr. Maude Leon.    Interval history:   He comes in today for follow-up.  He was seen by Dr. Maude Leon last week and was noted to have increased movement in his facial nerve.  The patient says that he is overall doing well.  His biggest complaints relate  to fatigue in the last month as well as some occasional discomfort in an area along the right side of his face.  He says that this causes some occasional sharp pains that are brief in nature.  He is not having any difficulty or pain with mouth opening.  He is seeing Lynn Starkey for facial nerve therapy and is occasionally doing his exercises.      MEDICATIONS:     Current Outpatient Prescriptions   Medication Sig Dispense Refill     tamsulosin (FLOMAX) 0.4 MG capsule   2     insulin aspart (NOVOLOG FLEXPEN) 100 UNIT/ML injection 1 unit per 4 Gms CHO at meals and snacks + correction scale of 1 unit per 25 mg/dL over 125. Average daily dose is 75 units 15 mL 2     omeprazole (PRILOSEC) 40 MG capsule   2     erythromycin (ROMYCIN) ophthalmic ointment Place 1 Application into the right eye 3 times daily 3.5 g 1     Artificial Tear Ointment (ARTIFICIAL TEARS) OINT Apply 1 Application to eye At Bedtime 3.5 g 11     Propylene Glycol-Glycerin (ARTIFICIAL TEARS) 1-0.3 % SOLN Apply 4 drops to eye every 2 hours (while awake) 30 mL 11     hypromellose-dextran (ARTIFICAL TEARS) SOLN ophthalmic solution Place 1 drop into the right eye every hour as needed for dry eyes Apply at least 4 times daily and as needed for dry eye 1 Bottle 3     dapagliflozin (FARXIGA) 10 MG TABS tablet Take 1 tablet (10 mg) by mouth daily (Patient taking differently: Take 10 mg by mouth every morning ) 90 tablet 3     pravastatin (PRAVACHOL) 10 MG tablet Take 2 tablets (20 mg) by mouth daily (Patient taking differently: Take 10 mg by mouth every morning ) 90 tablet 3     blood glucose monitoring (ACCU-CHEK EDINSON PLUS) test strip Use to test blood sugar 4 times daily 400 each 3     blood glucose monitoring (ACCU-CHEK FASTCLIX) lancets Use to test blood sugar 4 times daily or as directed.  1 box = 102 lancets 408 each 3     rifaximin (XIFAXAN) 550 MG TABS tablet Take 1 tablet (550 mg) by mouth 2 times daily 60 tablet 11     lactulose (CHRONULAC) 10  GM/15ML solution Take 45 mLs (30 g) by mouth 4 times daily (Patient taking differently: Take 60 g by mouth 4 times daily 2-6 TIMES A DAY) 7200 mL 11     insulin degludec (TRESIBA) 200 UNIT/ML pen Take 120 units daily. (Patient taking differently: Inject 110 Units Subcutaneous every morning Take 110 units dailY AS OF 10/19/17) 36 mL 3     spironolactone (ALDACTONE) 25 MG tablet Take 1 tablet (25 mg) by mouth daily (Patient taking differently: Take 25 mg by mouth every morning ) 90 tablet 1     insulin pen needle (BD VIKTORIA U/F) 32G X 4 MM Use as directed. 100 each 11     carvedilol (COREG) 12.5 MG tablet Take 1 tablet (12.5 mg) by mouth 2 times daily (with meals) 180 tablet 3     OLANZapine (ZYPREXA) 2.5 MG tablet Take 1 tablet (2.5 mg) by mouth At Bedtime 30 tablet 5     potassium chloride SA (K-DUR/KLOR-CON M) 20 MEQ CR tablet Take 1 tablet (20 mEq) by mouth daily (Patient taking differently: Take 20 mEq by mouth every morning ) 90 tablet 3     Cholecalciferol (VITAMIN D-3 PO) Take 2,000 Units by mouth every morning        cyanocobalamin (RA VITAMIN B-12 TR) 1000 MCG TBCR Take 1,000 mcg by mouth every morning        Multiple Vitamin (THERAVITE PO) Take 1 tablet by mouth every morning          ALLERGIES:    Allergies   Allergen Reactions     Codeine Other (See Comments)     Cannot take due to liver  Cannot tolerate oral narcotics       HABITS/SOCIAL HISTORY:   Chewing tobacco use  Quit alcohol in   Wife   Daughter lives in [place]    Social History     Social History     Marital status:      Spouse name: N/A     Number of children: N/A     Years of education: N/A     Occupational History     Not on file.     Social History Main Topics     Smoking status: Never Smoker     Smokeless tobacco: Former User     Types: Chew     Quit date: 10/31/2017      Comment: 1 tin per week     Alcohol use No      Comment: quit 1996     Drug use: No     Sexual activity: Not Currently     Partners: Female      Birth control/ protection: Condom     Other Topics Concern     Not on file     Social History Narrative       PAST MEDICAL HISTORY:   Past Medical History:   Diagnosis Date     Anemia 2013    Low blood plates current is 37     BPH (benign prostatic hyperplasia)      Cholelithiasis      Conductive hearing loss 8/16/2017    Have a lump on my right side of my face.  Had wax discharge     Depressive disorder 1986    Suffer effects throughout life     Gastroesophageal reflux disease 12/1/2014    Being treated with Prilosac     Hepatitis 2014    Diagnosed with schrosis ESTRADA in 2014.  Suffer from hepatatie     Hyperlipidemia      Liver cirrhosis secondary to ESTRADA (H)      Thrombocytopenia (H)      Type II diabetes mellitus (H)         PAST SURGICAL HISTORY:   Past Surgical History:   Procedure Laterality Date     ESOPHAGOSCOPY, GASTROSCOPY, DUODENOSCOPY (EGD), COMBINED N/A 11/17/2016    Procedure: COMBINED ESOPHAGOSCOPY, GASTROSCOPY, DUODENOSCOPY (EGD);  Surgeon: Santi Rosas MD;  Location:  GI     ESOPHAGOSCOPY, GASTROSCOPY, DUODENOSCOPY (EGD), COMBINED N/A 11/17/2017    Procedure: COMBINED ESOPHAGOSCOPY, GASTROSCOPY, DUODENOSCOPY (EGD);  EGD;  Surgeon: Santi Rosas MD;  Location:  GI     HEAD & NECK SURGERY       IMPLANT GOLD WEIGHT EYELID Right 11/16/2017    Procedure: IMPLANT WEIGHT EYELID;  Right Upper Eyelid Weight, right tarsal strip lower eyelid;  Surgeon: Milana Malave MD;  Location: [facility] OR     KNEE SURGERY Left      ORTHOPEDIC SURGERY       PAROTIDECTOMY, RADICAL NECK DISSECTION Right 11/2/2017    Procedure: PAROTIDECTOMY, RADICAL NECK DISSECTION;  Right Superfacial Parotidectomy , Facial nerve repair. with [facility] facial nerve monitor.;  Surgeon: Asiya Morgan MD;  Location: [facility] OR     PICC INSERTION Left 11/06/2017    4fr SL BioFlo PICC, 44cm in the L basilic vein w/ tip in the low SVC     VASCULAR SURGERY         FAMILY HISTORY:    Family History   Problem Relation Age of Onset  "    Prostate Cancer Maternal Grandfather      Substance Abuse Maternal Grandfather      Alcohol     Colon Cancer Father 60     Pancreatic Cancer Father 60     Prostate Cancer Father      Colorectal Cancer Father      Macular Degeneration Father      CANCER Father      Colorectal Cancer Maternal Grandmother      CANCER Maternal Grandmother      Substance Abuse Maternal Grandmother      Alcohol     Colorectal Cancer Paternal Grandmother      CANCER Mother      DIABETES Mother       3/2016     CEREBROVASCULAR DISEASE Mother      Passed away in Feb of this year, 80 years old.     Thyroid Disease Mother      Depression Mother      Asthma Sister      Had since birth     Thyroid Disease Sister      Depression Sister      Liver Disease No family hx of        REVIEW OF SYSTEMS:  12 point ROS was negative other than the symptoms noted above in the HPI.  Patient Supplied Answers to Review of Systems  [facility] ENT ROS 2018   Constitutional Weight gain, Weight loss, Appetite change, Unexplained fatigue, Problems with sleep, Unexplained fever or night sweats   Neurology -   Eyes -   Ears, Nose, Throat Ringing/noise in ears   Cardiopulmonary Cough   Gastrointestinal/Genitourinary -   Musculoskeletal Sore or stiff joints, Swollen legs/feet   Allergy/Immunology Allergies or hay fever   Hematologic Easy bruising   Endocrine Thirst   Skin -         PHYSICAL EXAMINATION:   Ht 1.74 m (5' 8.5\")  Wt 82.6 kg (182 lb)  BMI 27.27 kg/m2   Patient in NAD  No evidence of recurrent mass in the parotid bed  Incision well healed   Movement and all branches of the facial nerve, significantly improved from previous, complete eye closure without scleral show with eyelid weight      IMPRESSION AND PLAN:   Frandy Workman is a 52-year-old gentleman with a right-sided parotid mass s/p superficial parotidectomy. Final pathology was consistent with an abscess growing penicillin sensitive strep.  He has no evidence of recurrence.  His facial nerve " has shown significant improvement in its function.  He may even get to have his weight removed depending on his overall recovery.  He should continue to work with the therapist on his facial nerve function.  I will see him back on an as-needed basis.  CC:  Natalie Russell MD  [address]  [facility] [address]    Sheba Rueda MD  Otolaryngology/Head & Neck Surgery  [facility] [address]    Sergio Noel MD  Department of Infectious Disease  [facility]    Asiya Morgan MD

## 2018-01-30 NOTE — PROGRESS NOTES
Dear Dr. Rueda:    I had the pleasure of seeing Frandy Workman in follow-up today at the [facility].     History of Present Illness:   Frandy Workman is a 52-year-old gentleman who was initially referred for evaluation of a right parotid lesion. He was evaluated by Dr. Rueda who obtained a CT scan which showed a mass of the right parotid region. The patient was having significant pain associated with the mass. He underwent an FNA in clinic that showed inflammatory cells and no malignancy. He was placed on a course of augmentin and sent for image guided FNA of the mass. The mass again was nondiagnostic with just inflammatory cells but the mass appeared to have increased in size on the U/S. His case was discussed at tumor board and the recommendation was for surgical excision. He was taken to the OR on 11/2/2017 for a parotidectomy. Intraoperatively there was expression of purulence from the mass, intraoperative cytology and frozen sections were consistent with an abscess. The abscess was encasing the superior division of the facial nerve. The nerve was preserved in the OR. Cultures showed strep that was penicillin sensitive. Postoperatively ID was consulted and recommended a prolonged course of IV antibiotics. He was discharged with a PICC and ertapenem and completed a prolonged course.  He had eyelid weight placement and a lateral tarsal strip by Dr. Maude Leon.    Interval history:   He comes in today for follow-up.  He was seen by Dr. Maude Leon last week and was noted to have increased movement in his facial nerve.  The patient says that he is overall doing well.  His biggest complaints relate to fatigue in the last month as well as some occasional discomfort in an area along the right side of his face.  He says that this causes some occasional sharp pains that are brief in nature.  He is not having any difficulty or pain with mouth opening.  He is seeing Lynn Starkey for  facial nerve therapy and is occasionally doing his exercises.      MEDICATIONS:     Current Outpatient Prescriptions   Medication Sig Dispense Refill     tamsulosin (FLOMAX) 0.4 MG capsule   2     insulin aspart (NOVOLOG FLEXPEN) 100 UNIT/ML injection 1 unit per 4 Gms CHO at meals and snacks + correction scale of 1 unit per 25 mg/dL over 125. Average daily dose is 75 units 15 mL 2     omeprazole (PRILOSEC) 40 MG capsule   2     erythromycin (ROMYCIN) ophthalmic ointment Place 1 Application into the right eye 3 times daily 3.5 g 1     Artificial Tear Ointment (ARTIFICIAL TEARS) OINT Apply 1 Application to eye At Bedtime 3.5 g 11     Propylene Glycol-Glycerin (ARTIFICIAL TEARS) 1-0.3 % SOLN Apply 4 drops to eye every 2 hours (while awake) 30 mL 11     hypromellose-dextran (ARTIFICAL TEARS) SOLN ophthalmic solution Place 1 drop into the right eye every hour as needed for dry eyes Apply at least 4 times daily and as needed for dry eye 1 Bottle 3     dapagliflozin (FARXIGA) 10 MG TABS tablet Take 1 tablet (10 mg) by mouth daily (Patient taking differently: Take 10 mg by mouth every morning ) 90 tablet 3     pravastatin (PRAVACHOL) 10 MG tablet Take 2 tablets (20 mg) by mouth daily (Patient taking differently: Take 10 mg by mouth every morning ) 90 tablet 3     blood glucose monitoring (ACCU-CHEK EDINSON PLUS) test strip Use to test blood sugar 4 times daily 400 each 3     blood glucose monitoring (ACCU-CHEK FASTCLIX) lancets Use to test blood sugar 4 times daily or as directed.  1 box = 102 lancets 408 each 3     rifaximin (XIFAXAN) 550 MG TABS tablet Take 1 tablet (550 mg) by mouth 2 times daily 60 tablet 11     lactulose (CHRONULAC) 10 GM/15ML solution Take 45 mLs (30 g) by mouth 4 times daily (Patient taking differently: Take 60 g by mouth 4 times daily 2-6 TIMES A DAY) 7200 mL 11     insulin degludec (TRESIBA) 200 UNIT/ML pen Take 120 units daily. (Patient taking differently: Inject 110 Units Subcutaneous every morning  Take 110 units dailY AS OF 10/19/17) 36 mL 3     spironolactone (ALDACTONE) 25 MG tablet Take 1 tablet (25 mg) by mouth daily (Patient taking differently: Take 25 mg by mouth every morning ) 90 tablet 1     insulin pen needle (BD VIKTORIA U/F) 32G X 4 MM Use as directed. 100 each 11     carvedilol (COREG) 12.5 MG tablet Take 1 tablet (12.5 mg) by mouth 2 times daily (with meals) 180 tablet 3     OLANZapine (ZYPREXA) 2.5 MG tablet Take 1 tablet (2.5 mg) by mouth At Bedtime 30 tablet 5     potassium chloride SA (K-DUR/KLOR-CON M) 20 MEQ CR tablet Take 1 tablet (20 mEq) by mouth daily (Patient taking differently: Take 20 mEq by mouth every morning ) 90 tablet 3     Cholecalciferol (VITAMIN D-3 PO) Take 2,000 Units by mouth every morning        cyanocobalamin (RA VITAMIN B-12 TR) 1000 MCG TBCR Take 1,000 mcg by mouth every morning        Multiple Vitamin (THERAVITE PO) Take 1 tablet by mouth every morning          ALLERGIES:    Allergies   Allergen Reactions     Codeine Other (See Comments)     Cannot take due to liver  Cannot tolerate oral narcotics       HABITS/SOCIAL HISTORY:   Chewing tobacco use  Quit alcohol in   Wife   Daughter lives in [place]    Social History     Social History     Marital status:      Spouse name: N/A     Number of children: N/A     Years of education: N/A     Occupational History     Not on file.     Social History Main Topics     Smoking status: Never Smoker     Smokeless tobacco: Former User     Types: Chew     Quit date: 10/31/2017      Comment: 1 tin per week     Alcohol use No      Comment: quit 1996     Drug use: No     Sexual activity: Not Currently     Partners: Female     Birth control/ protection: Condom     Other Topics Concern     Not on file     Social History Narrative       PAST MEDICAL HISTORY:   Past Medical History:   Diagnosis Date     Anemia 2013    Low blood plates current is 37     BPH (benign prostatic hyperplasia)      Cholelithiasis       Conductive hearing loss 8/16/2017    Have a lump on my right side of my face.  Had wax discharge     Depressive disorder 1986    Suffer effects throughout life     Gastroesophageal reflux disease 12/1/2014    Being treated with Prilosac     Hepatitis 2014    Diagnosed with schrosis ESTRADA in 2014.  Suffer from hepatatie     Hyperlipidemia      Liver cirrhosis secondary to ESTRADA (H)      Thrombocytopenia (H)      Type II diabetes mellitus (H)         PAST SURGICAL HISTORY:   Past Surgical History:   Procedure Laterality Date     ESOPHAGOSCOPY, GASTROSCOPY, DUODENOSCOPY (EGD), COMBINED N/A 11/17/2016    Procedure: COMBINED ESOPHAGOSCOPY, GASTROSCOPY, DUODENOSCOPY (EGD);  Surgeon: Santi Rosas MD;  Location:  GI     ESOPHAGOSCOPY, GASTROSCOPY, DUODENOSCOPY (EGD), COMBINED N/A 11/17/2017    Procedure: COMBINED ESOPHAGOSCOPY, GASTROSCOPY, DUODENOSCOPY (EGD);  EGD;  Surgeon: Santi Rosas MD;  Location:  GI     HEAD & NECK SURGERY       IMPLANT GOLD WEIGHT EYELID Right 11/16/2017    Procedure: IMPLANT WEIGHT EYELID;  Right Upper Eyelid Weight, right tarsal strip lower eyelid;  Surgeon: Milana Malave MD;  Location: [facility] OR     KNEE SURGERY Left      ORTHOPEDIC SURGERY       PAROTIDECTOMY, RADICAL NECK DISSECTION Right 11/2/2017    Procedure: PAROTIDECTOMY, RADICAL NECK DISSECTION;  Right Superfacial Parotidectomy , Facial nerve repair. with [facility] facial nerve monitor.;  Surgeon: Asiya Morgan MD;  Location: [facility] OR     PICC INSERTION Left 11/06/2017    4fr SL BioFlo PICC, 44cm in the L basilic vein w/ tip in the low SVC     VASCULAR SURGERY         FAMILY HISTORY:    Family History   Problem Relation Age of Onset     Prostate Cancer Maternal Grandfather      Substance Abuse Maternal Grandfather      Alcohol     Colon Cancer Father 60     Pancreatic Cancer Father 60     Prostate Cancer Father      Colorectal Cancer Father      Macular Degeneration Father      CANCER Father      Colorectal Cancer  "Maternal Grandmother      CANCER Maternal Grandmother      Substance Abuse Maternal Grandmother      Alcohol     Colorectal Cancer Paternal Grandmother      CANCER Mother      DIABETES Mother       3/2016     CEREBROVASCULAR DISEASE Mother      Passed away in Feb of this year, 80 years old.     Thyroid Disease Mother      Depression Mother      Asthma Sister      Had since birth     Thyroid Disease Sister      Depression Sister      Liver Disease No family hx of        REVIEW OF SYSTEMS:  12 point ROS was negative other than the symptoms noted above in the HPI.  Patient Supplied Answers to Review of Systems  [facility] ROS 2018   Constitutional Weight gain, Weight loss, Appetite change, Unexplained fatigue, Problems with sleep, Unexplained fever or night sweats   Neurology -   Eyes -   Ears, Nose, Throat Ringing/noise in ears   Cardiopulmonary Cough   Gastrointestinal/Genitourinary -   Musculoskeletal Sore or stiff joints, Swollen legs/feet   Allergy/Immunology Allergies or hay fever   Hematologic Easy bruising   Endocrine Thirst   Skin -         PHYSICAL EXAMINATION:   Ht 1.74 m (5' 8.5\")  Wt 82.6 kg (182 lb)  BMI 27.27 kg/m2   Patient in NAD  No evidence of recurrent mass in the parotid bed  Incision well healed   Movement and all branches of the facial nerve, significantly improved from previous, complete eye closure without scleral show with eyelid weight      IMPRESSION AND PLAN:   Frandy Wrokman is a 52-year-old gentleman with a right-sided parotid mass s/p superficial parotidectomy. Final pathology was consistent with an abscess growing penicillin sensitive strep.  He has no evidence of recurrence.  His facial nerve has shown significant improvement in its function.  He may even get to have his weight removed depending on his overall recovery.  He should continue to work with the therapist on his facial nerve function.  I will see him back on an as-needed basis.    Thank you very much for the " opportunity to participate in the care of your patient.      Asiya Morgan M.D.  Otolaryngology- Head & Neck Surgery          CC:  Natalie Russell MD  [address] [place] [id number]  [facility] [id number]        Sheba Rueda MD  Otolaryngology/Head & Neck Surgery  [facility] [address]      Sergio Noel MD  Department of Infectious Disease  [facility]

## 2018-02-05 ENCOUNTER — TELEPHONE (OUTPATIENT)
Dept: GASTROENTEROLOGY | Facility: CLINIC | Age: 53
End: 2018-02-05

## 2018-02-05 NOTE — TELEPHONE ENCOUNTER
Prior Authorization Specialty Medication Request    Medication/Dose: Xifaxan 550 mg tab  Diagnosis and ICD: Hepatic encephalopathy K72.90   New/Renewal/Insurance Change PA: Renewal    Important Lab Values: Albumin 3.0, Alk Phos 168    Previously Tried and Failed Therapies: Lactulose    Rationale: Xifaxan helps to lower the toxin build up in the blood while lactulose works by cleansing the toxin build up in the liver. Adjunctive therapy.      Would you like to include any research articles? yes   If yes please include the hyperlink(s) below or fax @ [phone].    (Include Name and MRN)    [email or web address]/[email or web address]    If you received a fax notification from an outside Pharmacy;  Pharmacy Name:  Pharmacy #:  Pharmacy Fax:

## 2018-02-05 NOTE — TELEPHONE ENCOUNTER
Prior Authorization Approval    Authorization Effective Date: 12/30/2017  Authorization Expiration Date: 12/30/2018  Medication: Xifaxan 550 mg tab-APPROVED  Approved Dose/Quantity:   Reference #: [id number]   Insurance Company: [employer] - Phone [phone] Fax [phone]  Expected CoPay: $693.89     CoPay Card Available:      Foundation Assistance Needed:    Which Pharmacy is filling the prescription (Not needed for infusion/clinic administered): [id number] - [place], [place] - [address]  Pharmacy Notified: Yes  Patient Notified: Yes

## 2018-02-05 NOTE — TELEPHONE ENCOUNTER
Central Prior Authorization Team   Phone: [phone]      PA Initiation    Medication: Xifaxan 550 mg tab-PA Initiated  Insurance Company: [employer] - Phone [phone] Fax [phone]  Pharmacy Filling the Rx: [id number] - [place], [place] - [address]  Filling Pharmacy Phone: [phone]  Filling Pharmacy Fax: [phone]  Start Date: 2/5/2018

## 2018-02-07 ENCOUNTER — THERAPY VISIT (OUTPATIENT)
Dept: SPEECH THERAPY | Facility: CLINIC | Age: 53
End: 2018-02-07
Payer: MEDICARE

## 2018-02-07 DIAGNOSIS — R47.1 DYSARTHRIA: ICD-10-CM

## 2018-02-07 DIAGNOSIS — R29.810 FACIAL WEAKNESS: Primary | ICD-10-CM

## 2018-02-08 ENCOUNTER — OFFICE VISIT (OUTPATIENT)
Dept: INTERNAL MEDICINE | Facility: CLINIC | Age: 53
End: 2018-02-08
Payer: MEDICARE

## 2018-02-08 VITALS
SYSTOLIC BLOOD PRESSURE: 105 MMHG | RESPIRATION RATE: 18 BRPM | WEIGHT: 181 LBS | DIASTOLIC BLOOD PRESSURE: 67 MMHG | BODY MASS INDEX: 27.12 KG/M2 | HEART RATE: 61 BPM

## 2018-02-08 DIAGNOSIS — R79.89 ELEVATED SERUM CREATININE: ICD-10-CM

## 2018-02-08 DIAGNOSIS — E55.9 VITAMIN D DEFICIENCY: ICD-10-CM

## 2018-02-08 DIAGNOSIS — Z79.4 TYPE 2 DIABETES MELLITUS WITHOUT COMPLICATION, WITH LONG-TERM CURRENT USE OF INSULIN (H): ICD-10-CM

## 2018-02-08 DIAGNOSIS — R53.83 FATIGUE, UNSPECIFIED TYPE: Primary | ICD-10-CM

## 2018-02-08 DIAGNOSIS — R53.83 FATIGUE, UNSPECIFIED TYPE: ICD-10-CM

## 2018-02-08 DIAGNOSIS — E11.9 TYPE 2 DIABETES MELLITUS WITHOUT COMPLICATION, WITH LONG-TERM CURRENT USE OF INSULIN (H): ICD-10-CM

## 2018-02-08 PROBLEM — K11.8 PAROTID MASS: Status: RESOLVED | Noted: 2017-09-30 | Resolved: 2018-02-08

## 2018-02-08 PROBLEM — D49.0 PAROTID TUMOR: Status: RESOLVED | Noted: 2017-11-02 | Resolved: 2018-02-08

## 2018-02-08 LAB
ANION GAP SERPL CALCULATED.3IONS-SCNC: 7 MMOL/L (ref 3–14)
BUN SERPL-MCNC: 23 MG/DL (ref 7–30)
CALCIUM SERPL-MCNC: 8.4 MG/DL (ref 8.5–10.1)
CHLORIDE SERPL-SCNC: 111 MMOL/L (ref 94–109)
CO2 SERPL-SCNC: 23 MMOL/L (ref 20–32)
CREAT SERPL-MCNC: 1.19 MG/DL (ref 0.66–1.25)
GFR SERPL CREATININE-BSD FRML MDRD: 64 ML/MIN/1.7M2
GLUCOSE SERPL-MCNC: 188 MG/DL (ref 70–99)
POTASSIUM SERPL-SCNC: 4.2 MMOL/L (ref 3.4–5.3)
SODIUM SERPL-SCNC: 141 MMOL/L (ref 133–144)
TSH SERPL DL<=0.005 MIU/L-ACNC: 0.71 MU/L (ref 0.4–4)

## 2018-02-08 PROCEDURE — 82306 VITAMIN D 25 HYDROXY: CPT | Performed by: INTERNAL MEDICINE

## 2018-02-08 ASSESSMENT — PAIN SCALES - GENERAL: PAINLEVEL: MODERATE PAIN (5)

## 2018-02-08 NOTE — NURSING NOTE
Chief Complaint   Patient presents with     Fatigue     Patient is here to discuss ongoing fatigue, duration 1 month     Gay Ortiz CMA 1:15 PM on 2/8/2018.

## 2018-02-08 NOTE — PROGRESS NOTES
Chief complaint:  Frandy Workman is a 53 year old male presents for to clinic for follow-up of ESTRADA and DM 2.   Chief Complaint   Patient presents with     Fatigue     Patient is here to discuss ongoing fatigue, duration 1 month        SUBJECTIVE:  Mr. Workman is a 53 year old man with a history of ESTRADA and type 2 diabetes who presents for regular follow up. Notable recent changes include having a right parotid mass removed by ENT in Nov 2017 as well as moving from an assisted living home to his own townhouse towards the end of Dec 2017.  His primary concern at this visit is significant chronic fatigue that began in December 2017. He notes the fatigue interferes with his daily life because of lack of energy throughout the day to perform regular activities. He goes to bed regularly between 9-10 pm taking around 2 hours to fall asleep. He wakes up 3-4 times per night with difficulty falling back asleep. He tends to have lights on at night and watches TV. He stopped chewing in Nov 2017 and doesn't drink caffeine past 3pm. In the mornings he wakes at 8:30 AM but tends not to feel fully awake until 10 AM. Adequate sleep has helped his fatigue however oversleeping makes him more tired. He denies snoring or waking up short of breath. For his diabetes he states that he has been well controlled lately with his AM fasting SMBG between 70 and 120 mg/dl. He notes that he has had peripheral neuropathy on the bottoms of his feet that has been stable. He also sees ophtho appropriately. His main concern is the need to have a prescription for insulin needle tips that specifically notes he can use 5 per day because insurance has been denying them.      Medications and allergies were reviewed by me today.     SocHx:   History   Smoking Status     Never Smoker   Smokeless Tobacco     Former User     Types: Chew     Quit date: 10/31/2017     Comment: 1 tin per week      PHQ-2 ( 1999 [employer]) 1/29/2018   Q1: Little interest or pleasure  in doing things 0   Q2: Feeling down, depressed or hopeless 0   PHQ-2 Score 0       Patient Active Problem List   Diagnosis     Hepatic cirrhosis (H)     Type II diabetes mellitus (H)     Bipolar affective disorder in remission (H)     Esophageal varices (H)     Nonalcoholic steatohepatitis (ESTRADA)     Parotid mass     Parotid tumor     Parotid abscess     Brow ptosis     Paralytic lagophthalmos of right upper eyelid       Review Of Systems   7 point ROS completed and negative except noted above, including Gen, CV, Resp, GI, MS    PE:  /67 (BP Location: Right arm, Patient Position: Sitting, Cuff Size: Adult Regular)  Pulse 61  Resp 18  Wt 82.1 kg (181 lb)  BMI 27.12 kg/m2  Gen: no distress, comfortable, pleasant, appropriate energy level  HEENT: noticeable surgical sight with sunken skin in right parotid region  Neck: no LAD  Cardiovascular: regular rate and rhythm, normal S1 and S2, no murmurs, rubs or gallops  Respiratory: clear to auscultation, no wheezes or crackles, normal breath sounds   Abd: Soft, NT, ND, nl bowel sounds  Skin: no concerning lesions, no jaundice   Psychological: appropriate mood     ASSESSMENT/PLAN:  Fatigue:   Given several months of fatigue and poor sleep unrestful night time sleep it would be beneficial to refer to sleep med to consider ARIELA or circadian delay. We will also check thyroid and vitamin D.  - Referral to sleep medicine  - Discussion proper sleep hygiene with the addition of 1 mg melatonin 30-60 minutes before sleep  - Provided a handout on proper sleep hygiene  - Labs: TSH, VIT D      Diabetes:  Well controlled diabetes on insulin with last A1c 7.6 in Oct 2017. Continue current regimen with regular foot, eye, renal cares.  - We provided a prescription specifying 5 needles per day  - BMP to follow BUN/Cr    RTC: 6 months (different provider 2/2 maternity leave)    This note was scribed by Mich Vidal MS4 on behalf of Dr. Natalie Russell,  MD

## 2018-02-08 NOTE — PATIENT INSTRUCTIONS
"Tips for Sleep Hygiene  \"Sleep hygiene\" means having good sleep habits.Follow these tips to sleep better at night:     Get on a schedule. Go to bed and get up at about the same time every day.    Listen to your body. Only try to sleep when you actually feel tired or sleepy.    Be patient. If you haven't been able to get to sleep after about 30 minutes or more, get up and do something calming or boring until you feel sleepy. Then return to bed and try again.    Don't have caffeine (coffee, tea, cola drinks, chocolate and some medicines), alcohol or nicotine (cigarettes). These can make it harder for you to fall asleep and stay asleep.    Use your bed for sleeping only. That means no TV, computer or homework in bed, especially during the evening and before bedtime.    Don't nap during the day. If you must nap, make sure it is for less than 20 minutes.    Create sleep rituals that remind your body it is time to sleep. Examples include breathing exercises, stretching or reading a book.    Avoid all electronic media (smart phone, computer, tablet) within 2 hours of bed time. The \"blue light\" in these devices activates the part of the brain that keeps you awake.    Dim the lights at night.    Get early morning sources of light (walk in the sunshine) to help set sleep patterns at night.    Try a bath or shower before bed. Having a warm bath 1 to 2 hours before bedtime can help you feel sleepy. Hot baths can make you alert, so be mindful of the temperature.    Don't watch the clock. Checking the clock during the night can wake you up. It can also lead to negative thoughts such as, \"I will never fall asleep,\" which can increase anxiety and sleeplessness.    Use a sleep diary. Track your sleep schedule to know your sleep patterns and to see where you can improve.    Get regular exercise every day. Try not to do heavy exercise in the 4 hours before bedtime.    Eat a healthy, balanced diet.    Try eating a light, healthy snack " before bed, but avoid eating a heavy meal.    Create the right sleeping area. A cool, dark, quiet room is best. If needed, try earplugs, fans and blackout curtains.    Keep your daytime routine the same even if you have a bad night sleep. Avoiding activities the next day can make it harder to sleep.  For informational purposes only. Not to replace the advice of your health care provider.   Copyright   2013 [facility]. All rights reserved. [employer] [id number] - 01/16.

## 2018-02-08 NOTE — MR AVS SNAPSHOT
"              After Visit Summary   2/8/2018    Frandy Workman    MRN: [id number]           Patient Information     Date Of Birth          1965        Visit Information        Provider Department      2/8/2018 2:00 PM Natalie Chun MD [facility]        Today's Diagnoses     Fatigue, unspecified type    -  1    Elevated serum creatinine        Vitamin D deficiency         Type 2 diabetes mellitus without complication, with long-term current use of insulin (H)          Care Instructions    Tips for Sleep Hygiene  \"Sleep hygiene\" means having good sleep habits.Follow these tips to sleep better at night:     Get on a schedule. Go to bed and get up at about the same time every day.    Listen to your body. Only try to sleep when you actually feel tired or sleepy.    Be patient. If you haven't been able to get to sleep after about 30 minutes or more, get up and do something calming or boring until you feel sleepy. Then return to bed and try again.    Don't have caffeine (coffee, tea, cola drinks, chocolate and some medicines), alcohol or nicotine (cigarettes). These can make it harder for you to fall asleep and stay asleep.    Use your bed for sleeping only. That means no TV, computer or homework in bed, especially during the evening and before bedtime.    Don't nap during the day. If you must nap, make sure it is for less than 20 minutes.    Create sleep rituals that remind your body it is time to sleep. Examples include breathing exercises, stretching or reading a book.    Avoid all electronic media (smart phone, computer, tablet) within 2 hours of bed time. The \"blue light\" in these devices activates the part of the brain that keeps you awake.    Dim the lights at night.    Get early morning sources of light (walk in the sunshine) to help set sleep patterns at night.    Try a bath or shower before bed. Having a warm bath 1 to 2 hours before bedtime can help you feel sleepy. Hot " "baths can make you alert, so be mindful of the temperature.    Don't watch the clock. Checking the clock during the night can wake you up. It can also lead to negative thoughts such as, \"I will never fall asleep,\" which can increase anxiety and sleeplessness.    Use a sleep diary. Track your sleep schedule to know your sleep patterns and to see where you can improve.    Get regular exercise every day. Try not to do heavy exercise in the 4 hours before bedtime.    Eat a healthy, balanced diet.    Try eating a light, healthy snack before bed, but avoid eating a heavy meal.    Create the right sleeping area. A cool, dark, quiet room is best. If needed, try earplugs, fans and blackout curtains.    Keep your daytime routine the same even if you have a bad night sleep. Avoiding activities the next day can make it harder to sleep.  For informational purposes only. Not to replace the advice of your health care provider.   Copyright   2013 [facility]. All rights reserved. [employer] 493563 - 01/16.            Follow-ups after your visit        Additional Services     SLEEP EVALUATION & MANAGEMENT REFERRAL - [facility]  [phone] (Age 2 and up)       Please be aware that coverage of these services is subject to the terms and limitations of your health insurance plan.  Call member services at your health plan with any benefit or coverage questions.      Please bring the following to your appointment:    >>   List of current medications   >>   This referral request   >>   Any documents/labs given to you for this referral                      Follow-up notes from your care team     Return in about 6 months (around 8/8/2018) for Routine Visit.      Your next 10 appointments already scheduled     Feb 08, 2018  2:45 PM CST   LAB with [facility] ([facility])    [address]  1st Floor  [facility] [address] "   [phone]           Please do not eat 10-12 hours before your appointment if you are coming in fasting for labs on lipids, cholesterol, or glucose (sugar). This does not apply to pregnant women. Water, hot tea and black coffee (with nothing added) are okay. Do not drink other fluids, diet soda or chew gum.            Feb 21, 2018 12:00 PM CST   (Arrive by 11:45 AM)   RETURN ENDOCRINE with Jennifer Wilcox MD   [facility] ([facility])    [address] [place] [id number] [phone]            Feb 28, 2018  1:00 PM CST   New Sleep Patient with Fili Owens MD   [facility] ([facility])    [address] [place] [address]   [phone]            Mar 08, 2018 12:30 PM CST   (Arrive by 12:15 PM)   Return Visit with Lamin Gonzalez DPM   [facility] ([facility])    [address] [place] [id number] [phone]            Mar 21, 2018  2:15 PM CDT   (Arrive by 2:00 PM)   Fac Paral Treatment with SALBADOR Mcginnis   [facility] Rehab ([facility])    [address] [place] [id number] [phone]            Apr 16, 2018 12:00 PM CDT   Lab with  LAB   [facility] Lab ([facility])    [address] [place] [id number] [phone]            Apr 16, 2018  1:00 PM CDT   (Arrive by 12:45 PM)   Return General Liver with Santi Dotson MD   [facility] Hepatology ([facility])    [address] [place] [id number] [phone]            May 16, 2018  2:00 PM CDT   (Arrive by 1:45 PM)   Return Visit with Milana Malave MD   [facility] Ear Nose and Throat ([facility])    [address] [place] [address]  [address]   [phone]            Aug 13, 2018  1:10 PM CDT   (Arrive by 12:55 PM)   Return Visit with Rani German MD   [facility] ([facility])    [address] [id number] [phone]              Future tests that were ordered for you today     Open Future Orders        Priority Expected Expires Ordered    Vitamin D Deficiency Routine 2/8/2018 2/8/2019 2/8/2018    TSH with free T4 reflex Routine 2/8/2018 2/22/2018 2/8/2018    Basic metabolic panel Routine 2/8/2018 2/22/2018 2/8/2018    SLEEP EVALUATION & MANAGEMENT REFERRAL - ADULT -[facility]  [phone] (Age 2 and up) Routine  2/8/2019 2/8/2018            Who to contact     Please call your clinic at [phone] to:    Ask questions about your health    Make or cancel appointments    Discuss your medicines    Learn about your test results    Speak to your doctor            Additional Information About Your Visit        [employer] Information     [email or web address] gives you secure access to your electronic health record. If you see a primary care provider, you can also send messages to your care team and make appointments. If you have questions, please call your primary care clinic.  If you do not have a primary care provider, please call [phone] and they will assist you.      [email or web address] is an electronic gateway that provides easy, online access to your medical records. With [email or web address], you can request a clinic appointment, read your test results, renew a prescription or communicate with your care team.     To access your existing account, please contact your [facility] or call [phone] for assistance.        Care EveryWhere ID     This is your Care EveryWhere ID. This could be used by other organizations to access your [facility] medical records  [id number]        Your Vitals Were     Pulse  Respirations BMI (Body Mass Index)             61 18 27.12 kg/m2          Blood Pressure from Last 3 Encounters:   02/08/18 105/67   12/08/17 111/72   11/20/17 104/48    Weight from Last 3 Encounters:   02/08/18 82.1 kg (181 lb)   01/29/18 82.6 kg (182 lb)   01/24/18 82.6 kg (182 lb)                 Today's Medication Changes          These changes are accurate as of 2/8/18  2:38 PM.  If you have any questions, ask your nurse or doctor.               These medicines have changed or have updated prescriptions.        Dose/Directions    dapagliflozin 10 MG Tabs tablet   Commonly known as:  FARXIGA   This may have changed:  when to take this   Used for:  Type 2 diabetes mellitus with hyperglycemia, with long-term current use of insulin (H)        Dose:  10 mg   Take 1 tablet (10 mg) by mouth daily   Quantity:  90 tablet   Refills:  3       insulin degludec 200 UNIT/ML pen   Commonly known as:  TRESIBA   This may have changed:    - how much to take  - how to take this  - when to take this  - additional instructions   Used for:  Type 2 diabetes mellitus with hyperglycemia, with long-term current use of insulin (H)        Take 120 units daily.   Quantity:  36 mL   Refills:  3       insulin pen needle 32G X 4 MM   Commonly known as:  BD VIKTORIA U/F   This may have changed:  additional instructions   Used for:  Type 2 diabetes mellitus without complication, with long-term current use of insulin (H)   Changed by:  Natalie Chun MD        Use 5 per day   Quantity:  150 each   Refills:  11       lactulose 10 GM/15ML solution   Commonly known as:  CHRONULAC   This may have changed:    - how much to take  - additional instructions   Used for:  Liver cirrhosis secondary to ESTRADA (H)        Dose:  30 g   Take 45 mLs (30 g) by mouth 4 times daily   Quantity:  7200 mL   Refills:  11       potassium chloride SA 20 MEQ CR tablet   Commonly known as:  K-DUR/KLOR-CON M   This may have changed:  when to take this   Used for:   Hypokalemia        Dose:  20 mEq   Take 1 tablet (20 mEq) by mouth daily   Quantity:  90 tablet   Refills:  3       pravastatin 10 MG tablet   Commonly known as:  PRAVACHOL   This may have changed:    - how much to take  - when to take this   Used for:  Hyperlipidemia LDL goal <100        Dose:  20 mg   Take 2 tablets (20 mg) by mouth daily   Quantity:  90 tablet   Refills:  3       spironolactone 25 MG tablet   Commonly known as:  ALDACTONE   This may have changed:  when to take this   Used for:  Other ascites        Dose:  25 mg   Take 1 tablet (25 mg) by mouth daily   Quantity:  90 tablet   Refills:  1            Where to get your medicines      These medications were sent to [facility] [address] [place] [address] & [address], [place] [address]     Phone:  [phone]     insulin pen needle 32G X 4 MM                Primary Care Provider Office Phone # Fax #    Natalie Mitzi Russell [phone]       [address] [place] [address] [address] [place] [address]        Equal Access to Services     [facility] : Hadii curly ku hadasho Soomaali, waaxda luqadaha, qaybta kaalmada ademckay, emy vuong. So [facility] [phone].    ATENCIÓN: Si habla español, tiene a hanley disposición servicios gratuitos de asistencia lingüística. [facility] [phone].    We comply with applicable federal civil rights laws and [place] laws. We do not discriminate on the basis of race, color, national origin, age, disability, sex, sexual orientation, or gender identity.            Thank you!     Thank you for choosing [facility]  for your care. Our goal is always to provide you with excellent care. Hearing back from our patients is one way we can continue to improve our services. Please take a few minutes to complete the written survey that you may receive in the mail after your visit with us. Thank you!             Your  Updated Medication List - Protect others around you: Learn how to safely use, store and throw away your medicines at www.disposemymeds.org.          This list is accurate as of 2/8/18  2:38 PM.  Always use your most recent med list.                   Brand Name Dispense Instructions for use Diagnosis    ARTIFICIAL TEARS Oint     3.5 g    Apply 1 Application to eye At Bedtime    Post-operative state       blood glucose monitoring lancets     408 each    Use to test blood sugar 4 times daily or as directed.  1 box = 102 lancets    Type II diabetes mellitus (H)       blood glucose monitoring test strip    ACCU-CHEK EDINSON PLUS    400 each    Use to test blood sugar 4 times daily    Type II diabetes mellitus (H)       carvedilol 12.5 MG tablet    COREG    180 tablet    Take 1 tablet (12.5 mg) by mouth 2 times daily (with meals)    Liver cirrhosis secondary to ESTRADA (H), Secondary esophageal varices without bleeding (H)       dapagliflozin 10 MG Tabs tablet    FARXIGA    90 tablet    Take 1 tablet (10 mg) by mouth daily    Type 2 diabetes mellitus with hyperglycemia, with long-term current use of insulin (H)       erythromycin ophthalmic ointment    ROMYCIN    3.5 g    Place 1 Application into the right eye 3 times daily    Post-operative state       hypromellose-dextran Soln ophthalmic solution     1 Bottle    Place 1 drop into the right eye every hour as needed for dry eyes Apply at least 4 times daily and as needed for dry eye    Dry eyes       insulin aspart 100 UNIT/ML injection    NovoLOG FLEXPEN    15 mL    1 unit per 4 Gms CHO at meals and snacks + correction scale of 1 unit per 25 mg/dL over 125. Average daily dose is 75 units    Type II diabetes mellitus (H)       insulin degludec 200 UNIT/ML pen    TRESIBA    36 mL    Take 120 units daily.    Type 2 diabetes mellitus with hyperglycemia, with long-term current use of insulin (H)       insulin pen needle 32G X 4 MM    BD VIKTORIA U/F    150 each    Use 5 per day     Type 2 diabetes mellitus without complication, with long-term current use of insulin (H)       lactulose 10 GM/15ML solution    CHRONULAC    7200 mL    Take 45 mLs (30 g) by mouth 4 times daily    Liver cirrhosis secondary to ESTRADA (H)       OLANZapine 2.5 MG tablet    zyPREXA    30 tablet    Take 1 tablet (2.5 mg) by mouth At Bedtime    Insomnia, unspecified type       omeprazole 40 MG capsule    priLOSEC          potassium chloride SA 20 MEQ CR tablet    K-DUR/KLOR-CON M    90 tablet    Take 1 tablet (20 mEq) by mouth daily    Hypokalemia       pravastatin 10 MG tablet    PRAVACHOL    90 tablet    Take 2 tablets (20 mg) by mouth daily    Hyperlipidemia LDL goal <100       Propylene Glycol-Glycerin 1-0.3 % Soln    ARTIFICIAL TEARS    30 mL    Apply 4 drops to eye every 2 hours (while awake)    Post-operative state       RA VITAMIN B-12 TR 1000 MCG Tbcr   Generic drug:  cyanocobalamin      Take 1,000 mcg by mouth every morning        rifaximin 550 MG Tabs tablet    XIFAXAN    60 tablet    Take 1 tablet (550 mg) by mouth 2 times daily    Hepatic encephalopathy (H)       spironolactone 25 MG tablet    ALDACTONE    90 tablet    Take 1 tablet (25 mg) by mouth daily    Other ascites       tamsulosin 0.4 MG capsule    FLOMAX          THERAVITE PO      Take 1 tablet by mouth every morning        VITAMIN D-3 PO      Take 2,000 Units by mouth every morning

## 2018-02-09 LAB — DEPRECATED CALCIDIOL+CALCIFEROL SERPL-MC: 40 UG/L (ref 20–75)

## 2018-02-13 DIAGNOSIS — G47.00 INSOMNIA, UNSPECIFIED TYPE: ICD-10-CM

## 2018-02-13 RX ORDER — OLANZAPINE 2.5 MG/1
2.5 TABLET, FILM COATED ORAL AT BEDTIME
Qty: 90 TABLET | Refills: 1 | Status: SHIPPED | OUTPATIENT
Start: 2018-02-13 | End: 2018-08-11

## 2018-03-08 ENCOUNTER — OFFICE VISIT (OUTPATIENT)
Dept: ENDOCRINOLOGY | Facility: CLINIC | Age: 53
End: 2018-03-08
Payer: MEDICARE

## 2018-03-08 DIAGNOSIS — E11.49 TYPE II OR UNSPECIFIED TYPE DIABETES MELLITUS WITH NEUROLOGICAL MANIFESTATIONS, NOT STATED AS UNCONTROLLED(250.60) (H): ICD-10-CM

## 2018-03-08 DIAGNOSIS — L60.2 ONYCHAUXIS: Primary | ICD-10-CM

## 2018-03-08 RX ORDER — CAPSAICIN 0.025 %
CREAM (GRAM) TOPICAL
Qty: 60 G | Refills: 6 | Status: SHIPPED | OUTPATIENT
Start: 2018-03-08 | End: 2019-03-01

## 2018-03-08 NOTE — PROGRESS NOTES
Past Medical History:   Diagnosis Date     Anemia 2013    Low blood plates current is 37     BPH (benign prostatic hyperplasia)      Cholelithiasis      Conductive hearing loss 8/16/2017    Have a lump on my right side of my face.  Had wax discharge     Depressive disorder 1986    Suffer effects throughout life     Gastroesophageal reflux disease 12/1/2014    Being treated with Prilosac     Hepatitis 2014    Diagnosed with schrosis ESTRADA in 2014.  Suffer from hepatatie     Hyperlipidemia      Liver cirrhosis secondary to ESTRADA (H)      Thrombocytopenia (H)      Type II diabetes mellitus (H)      Patient Active Problem List   Diagnosis     Hepatic cirrhosis (H)     Type II diabetes mellitus (H)     Bipolar affective disorder in remission (H)     Esophageal varices (H)     Nonalcoholic steatohepatitis (ESTRADA)     Parotid abscess     Brow ptosis     Paralytic lagophthalmos of right upper eyelid     Past Surgical History:   Procedure Laterality Date     ESOPHAGOSCOPY, GASTROSCOPY, DUODENOSCOPY (EGD), COMBINED N/A 11/17/2016    Procedure: COMBINED ESOPHAGOSCOPY, GASTROSCOPY, DUODENOSCOPY (EGD);  Surgeon: Santi Rosas MD;  Location:  GI     ESOPHAGOSCOPY, GASTROSCOPY, DUODENOSCOPY (EGD), COMBINED N/A 11/17/2017    Procedure: COMBINED ESOPHAGOSCOPY, GASTROSCOPY, DUODENOSCOPY (EGD);  EGD;  Surgeon: Santi Rosas MD;  Location:  GI     HEAD & NECK SURGERY       IMPLANT GOLD WEIGHT EYELID Right 11/16/2017    Procedure: IMPLANT WEIGHT EYELID;  Right Upper Eyelid Weight, right tarsal strip lower eyelid;  Surgeon: Milana Malave MD;  [facility]     KNEE SURGERY Left      ORTHOPEDIC SURGERY       PAROTIDECTOMY, RADICAL NECK DISSECTION Right 11/2/2017    Procedure: PAROTIDECTOMY, RADICAL NECK DISSECTION;  Right Superfacial Parotidectomy , Facial nerve repair. with [facility] facial nerve monitor.;  Surgeon: Asiya Morgan MD;  Location: [facility] OR     PICC INSERTION Left 11/06/2017    4fr SL BioFlo PICC,  44cm in the L basilic vein w/ tip in the low SVC     VASCULAR SURGERY       Social History     Social History     Marital status:      Spouse name: N/A     Number of children: N/A     Years of education: N/A     Occupational History     Not on file.     Social History Main Topics     Smoking status: Never Smoker     Smokeless tobacco: Former User     Types: Chew     Quit date: 10/31/2017      Comment: 1 tin per week     Alcohol use No      Comment: quit 1996     Drug use: No     Sexual activity: Not Currently     Partners: Female     Birth control/ protection: Condom     Other Topics Concern     Not on file     Social History Narrative     Family History   Problem Relation Age of Onset     Prostate Cancer Maternal Grandfather      Substance Abuse Maternal Grandfather      Alcohol     Colon Cancer Father 60     Pancreatic Cancer Father 60     Prostate Cancer Father      Colorectal Cancer Father      Macular Degeneration Father      CANCER Father      Colorectal Cancer Maternal Grandmother      CANCER Maternal Grandmother      Substance Abuse Maternal Grandmother      Alcohol     Colorectal Cancer Paternal Grandmother      CANCER Mother      DIABETES Mother       3/2016     CEREBROVASCULAR DISEASE Mother      Passed away in Feb of this year, 80 years old.     Thyroid Disease Mother      Depression Mother      Asthma Sister      Had since birth     Thyroid Disease Sister      Depression Sister      Liver Disease No family hx of      Lab Results   Component Value Date    A1C 6.5 2017      Last Basic Metabolic Panel:  Lab Results   Component Value Date     2018      Lab Results   Component Value Date    POTASSIUM 4.2 2018     Lab Results   Component Value Date    CHLORIDE 111 2018     Lab Results   Component Value Date    DEBORAH 8.4 2018     Lab Results   Component Value Date    CO2 23 2018     Lab Results   Component Value Date    BUN 23 2018     Lab  Results   Component Value Date    CR 1.19 02/08/2018     Lab Results   Component Value Date     02/08/2018     Lab Results   Component Value Date    WBC 3.4 12/08/2017     Lab Results   Component Value Date    RBC 3.35 12/08/2017     Lab Results   Component Value Date    HGB 11.1 12/08/2017     Lab Results   Component Value Date    HCT 34.3 12/08/2017     No components found for: MCT  Lab Results   Component Value Date     12/08/2017     Lab Results   Component Value Date    MCH 33.1 12/08/2017     Lab Results   Component Value Date    MCHC 32.4 12/08/2017     Lab Results   Component Value Date    RDW 13.2 12/08/2017     Lab Results   Component Value Date    PLT 43 12/08/2017     SUBJECTIVE FINDINGS:  A 53-year-old male who returns to clinic for diabetic foot check.  He relates that he is doing well.  He has got a little sore on his right third toe.  He kind of bumped it.  He relates he gets pain in his feet at night kind of in the toes and the whole feet just hurt.  Relates no specific other relieving or aggravating factors.  He is wearing tennis shoes.  Did not get the over-the-counter inserts.      OBJECTIVE FINDINGS:  DP and PT are 2/4 bilaterally.  He has dorsally contracted digits 2 through 5 bilaterally.  He has dorsomedial first MPJ prominence and laterally deviated hallux bilaterally.  Sharp/dull is decreased with 5.07 [place] monofilament on the left foot and plantar MPJ on the right foot.  There is no erythema, drainage, no odor, no calor bilaterally.  He has a small scab on his right distal third toenail border.  It is intact and healing.  Deep tendon reflexes are intact bilaterally.  There are no gross tendon voids bilaterally.  He has incurvated nails 1 through 5 bilaterally.        ASSESSMENT AND PLAN:  Onychauxis bilaterally.  Diabetes with peripheral neuropathy.  The plantar fasciitis is doing better.  He is getting foot pain versus neuropathy.  He may be getting some  arthritis too.  He has a bunion present and a dorsal midfoot prominence which would indicate osteoarthritic changes.  Diagnosis and treatment discussed with him and advised him on some stretching exercises.  Prescription for capsaicin cream given and use discussed with him.  He opted for no diabetic shoes or inserts.  He will return to clinic and see me in about 6 months.  All the nails were reduced bilaterally upon consent.

## 2018-03-08 NOTE — LETTER
3/8/2018       RE: Frandy Workman  [address] [facility] [address]     Dear Colleague,    Thank you for referring your patient, Farndy Workman, to the [facility] ENDOCRINOLOGY at [facility]. Please see a copy of my visit note below.    Past Medical History:   Diagnosis Date     Anemia 2013    Low blood plates current is 37     BPH (benign prostatic hyperplasia)      Cholelithiasis      Conductive hearing loss 8/16/2017    Have a lump on my right side of my face.  Had wax discharge     Depressive disorder 1986    Suffer effects throughout life     Gastroesophageal reflux disease 12/1/2014    Being treated with Prilosac     Hepatitis 2014    Diagnosed with schrosis ESTRADA in 2014.  Suffer from hepatatie     Hyperlipidemia      Liver cirrhosis secondary to ESTRADA (H)      Thrombocytopenia (H)      Type II diabetes mellitus (H)      Patient Active Problem List   Diagnosis     Hepatic cirrhosis (H)     Type II diabetes mellitus (H)     Bipolar affective disorder in remission (H)     Esophageal varices (H)     Nonalcoholic steatohepatitis (ESTRADA)     Parotid abscess     Brow ptosis     Paralytic lagophthalmos of right upper eyelid     Past Surgical History:   Procedure Laterality Date     ESOPHAGOSCOPY, GASTROSCOPY, DUODENOSCOPY (EGD), COMBINED N/A 11/17/2016    Procedure: COMBINED ESOPHAGOSCOPY, GASTROSCOPY, DUODENOSCOPY (EGD);  Surgeon: Santi Rosas MD;  Location:  GI     ESOPHAGOSCOPY, GASTROSCOPY, DUODENOSCOPY (EGD), COMBINED N/A 11/17/2017    Procedure: COMBINED ESOPHAGOSCOPY, GASTROSCOPY, DUODENOSCOPY (EGD);  EGD;  Surgeon: Santi Rosas MD;  Location:  GI     HEAD & NECK SURGERY       IMPLANT GOLD WEIGHT EYELID Right 11/16/2017    Procedure: IMPLANT WEIGHT EYELID;  Right Upper Eyelid Weight, right tarsal strip lower eyelid;  Surgeon: Milana Malave MD;  Location: [facility] OR     KNEE SURGERY Left      ORTHOPEDIC SURGERY       PAROTIDECTOMY, RADICAL NECK  DISSECTION Right 2017    Procedure: PAROTIDECTOMY, RADICAL NECK DISSECTION;  Right Superfacial Parotidectomy , Facial nerve repair. with [facility] facial nerve monitor.;  Surgeon: Asiya Morgan MD;  Location: [facility] OR     PICC INSERTION Left 2017    4fr SL BioFlo PICC, 44cm in the L basilic vein w/ tip in the low SVC     VASCULAR SURGERY       Social History     Social History     Marital status:      Spouse name: N/A     Number of children: N/A     Years of education: N/A     Occupational History     Not on file.     Social History Main Topics     Smoking status: Never Smoker     Smokeless tobacco: Former User     Types: Chew     Quit date: 10/31/2017      Comment: 1 tin per week     Alcohol use No      Comment: quit 1996     Drug use: No     Sexual activity: Not Currently     Partners: Female     Birth control/ protection: Condom     Other Topics Concern     Not on file     Social History Narrative     Family History   Problem Relation Age of Onset     Prostate Cancer Maternal Grandfather      Substance Abuse Maternal Grandfather      Alcohol     Colon Cancer Father 60     Pancreatic Cancer Father 60     Prostate Cancer Father      Colorectal Cancer Father      Macular Degeneration Father      CANCER Father      Colorectal Cancer Maternal Grandmother      CANCER Maternal Grandmother      Substance Abuse Maternal Grandmother      Alcohol     Colorectal Cancer Paternal Grandmother      CANCER Mother      DIABETES Mother       3/2016     CEREBROVASCULAR DISEASE Mother      Passed away in Feb of this year, 80 years old.     Thyroid Disease Mother      Depression Mother      Asthma Sister      Had since birth     Thyroid Disease Sister      Depression Sister      Liver Disease No family hx of      Lab Results   Component Value Date    A1C 6.5 2017      Last Basic Metabolic Panel:  Lab Results   Component Value Date     2018      Lab Results   Component Value Date     POTASSIUM 4.2 02/08/2018     Lab Results   Component Value Date    CHLORIDE 111 02/08/2018     Lab Results   Component Value Date    DEBORAH 8.4 02/08/2018     Lab Results   Component Value Date    CO2 23 02/08/2018     Lab Results   Component Value Date    BUN 23 02/08/2018     Lab Results   Component Value Date    CR 1.19 02/08/2018     Lab Results   Component Value Date     02/08/2018     Lab Results   Component Value Date    WBC 3.4 12/08/2017     Lab Results   Component Value Date    RBC 3.35 12/08/2017     Lab Results   Component Value Date    HGB 11.1 12/08/2017     Lab Results   Component Value Date    HCT 34.3 12/08/2017     No components found for: MCT  Lab Results   Component Value Date     12/08/2017     Lab Results   Component Value Date    MCH 33.1 12/08/2017     Lab Results   Component Value Date    MCHC 32.4 12/08/2017     Lab Results   Component Value Date    RDW 13.2 12/08/2017     Lab Results   Component Value Date    PLT 43 12/08/2017     SUBJECTIVE FINDINGS:  A 53-year-old male who returns to clinic for diabetic foot check.  He relates that he is doing well.  He has got a little sore on his right third toe.  He kind of bumped it.  He relates he gets pain in his feet at night kind of in the toes and the whole feet just hurt.  Relates no specific other relieving or aggravating factors.  He is wearing tennis shoes.  Did not get the over-the-counter inserts.      OBJECTIVE FINDINGS:  DP and PT are 2/4 bilaterally.  He has dorsally contracted digits 2 through 5 bilaterally.  He has dorsomedial first MPJ prominence and laterally deviated hallux bilaterally.  Sharp/dull is decreased with 5.07 [place] monofilament on the left foot and plantar MPJ on the right foot.  There is no erythema, drainage, no odor, no calor bilaterally.  He has a small scab on his right distal third toenail border.  It is intact and healing.  Deep tendon reflexes are intact bilaterally.  There are no gross  tendon voids bilaterally.  He has incurvated nails 1 through 5 bilaterally.        ASSESSMENT AND PLAN:  Onychauxis bilaterally.  Diabetes with peripheral neuropathy.  The plantar fasciitis is doing better.  He is getting foot pain versus neuropathy.  He may be getting some arthritis too.  He has a bunion present and a dorsal midfoot prominence which would indicate osteoarthritic changes.  Diagnosis and treatment discussed with him and advised him on some stretching exercises.  Prescription for capsaicin cream given and use discussed with him.  He opted for no diabetic shoes or inserts.  He will return to clinic and see me in about 6 months.  All the nails were reduced bilaterally upon consent.     Sincerely,    Lamin Gonzalez DPM

## 2018-03-08 NOTE — MR AVS SNAPSHOT
After Visit Summary   3/8/2018    Frandy Workman    MRN: [id number]           Patient Information     Date Of Birth          1965        Visit Information        Provider Department      3/8/2018 12:30 PM Lamin Gonzalez DPM [facility]         Follow-ups after your visit        Your next 10 appointments already scheduled     Mar 08, 2018 12:30 PM CST   (Arrive by 12:15 PM)   Return Visit with Lamin Gonzalez DPM   [facility] Endocrinology ([facility])    [address] [place] [id number] [phone]            Mar 21, 2018  2:15 PM CDT   (Arrive by 2:00 PM)   Fac Paral Treatment with SALBADOR Mcginnis   [facility] Rehab ([facility])    [address] [place] [address] [phone]            Apr 11, 2018  2:30 PM CDT   (Arrive by 2:15 PM)   RETURN ENDOCRINE with Jennifer Wilcox MD   [facility] Endocrinology ([facility])    [address] [place] [id number] [phone]            Apr 16, 2018 12:00 PM CDT   Lab with  LAB   [facility] Lab ([facility])    [address] [place] [id number] [phone]            Apr 16, 2018  1:00 PM CDT   (Arrive by 12:45 PM)   Return General Liver with Santi Dotson MD   [facility] Hepatology ([facility])    [address] [place] [address]  [address]  [facility] [address]   [phone]            May 16, 2018  2:00 PM CDT   (Arrive by 1:45 PM)   Return Visit with Milana Malave MD   [facility] Ear Nose and Throat ([facility])    [address] [place] [address]   [phone]            Aug 13, 2018  1:10 PM CDT   (Arrive by 12:55 PM)   Return Visit with Rani German MD   [facility] Primary Care Clinic ([facility])    [facility]  [address] [facility] [address]   [phone]            Sep 13, 2018 12:30 PM CDT   (Arrive by 12:15 PM)   Return Visit with Lamin Gonzalez DPM   [facility] Endocrinology ([facility])    [address] [facility]  [address] [facility] [id number] [phone]              Who to contact     Please call your clinic at [phone] to:    Ask questions about your health    Make or cancel appointments    Discuss your medicines    Learn about your test results    Speak to your doctor            Additional Information About Your Visit        [employer] Information     [employer] gives you secure access to your electronic health record. If you see a primary care provider, you can also send messages to your care team and make appointments. If you have questions, please call your primary care clinic.  If you do not have a primary care provider, please call [phone] and they will assist you.      [employer] is an electronic gateway that provides easy, online access to your medical records. With [employer], you can request a clinic appointment, read your test results, renew a prescription or communicate with your care team.     To access your existing account, please contact your [facility] or call [phone] for assistance.        Care EveryWhere ID     This is your Care EveryWhere ID. This could be used by other organizations to access your [facility] medical records  [id number]         Blood Pressure from Last 3 Encounters:   02/08/18 105/67   12/08/17 111/72   11/20/17 104/48    Weight from Last 3 Encounters:   02/08/18 82.1 kg (181 lb)   01/29/18 82.6 kg (182 lb)   01/24/18 82.6 kg (182 lb)              Today, you had the following     No orders found for display         Today's Medication Changes          These changes are accurate as of 3/8/18 12:27 PM.  If you have any questions, ask your nurse or doctor.               These medicines have  changed or have updated prescriptions.        Dose/Directions    dapagliflozin 10 MG Tabs tablet   Commonly known as:  FARXIGA   This may have changed:  when to take this   Used for:  Type 2 diabetes mellitus with hyperglycemia, with long-term current use of insulin (H)        Dose:  10 mg   Take 1 tablet (10 mg) by mouth daily   Quantity:  90 tablet   Refills:  3       insulin degludec 200 UNIT/ML pen   Commonly known as:  TRESIBA   This may have changed:    - how much to take  - how to take this  - when to take this  - additional instructions   Used for:  Type 2 diabetes mellitus with hyperglycemia, with long-term current use of insulin (H)        Take 120 units daily.   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 (H)        Dose:  30 g   Take 45 mLs (30 g) by mouth 4 times daily   Quantity:  7200 mL   Refills:  11       potassium chloride SA 20 MEQ CR tablet   Commonly known as:  K-DUR/KLOR-CON M   This may have changed:  when to take this   Used for:  Hypokalemia        Dose:  20 mEq   Take 1 tablet (20 mEq) by mouth daily   Quantity:  90 tablet   Refills:  3       pravastatin 10 MG tablet   Commonly known as:  PRAVACHOL   This may have changed:    - how much to take  - when to take this   Used for:  Hyperlipidemia LDL goal <100        Dose:  20 mg   Take 2 tablets (20 mg) by mouth daily   Quantity:  90 tablet   Refills:  3       spironolactone 25 MG tablet   Commonly known as:  ALDACTONE   This may have changed:  when to take this   Used for:  Other ascites        Dose:  25 mg   Take 1 tablet (25 mg) by mouth daily   Quantity:  90 tablet   Refills:  1                Primary Care Provider Office Phone # Fax #    Natalie Russell [phone]       [address] [place] [address] [address] [place] [address] [id number] [place] [address]  Access to Services     [facility]: Hadii curly giraldo dalejanette Rasheeda, waadrianeda luqadaha, qaybta kaalmaemy navarro. So [facility] [phone].    ATENCIÓN: Si habla español, tiene a hanley disposición servicios gratuitos de asistencia lingüística. Llame al [phone].    We comply with applicable federal civil rights laws and [place] laws. We do not discriminate on the basis of race, color, national origin, age, disability, sex, sexual orientation, or gender identity.            Thank you!     Thank you for choosing [facility] ENDOCRINOLOGY  for your care. Our goal is always to provide you with excellent care. Hearing back from our patients is one way we can continue to improve our services. Please take a few minutes to complete the written survey that you may receive in the mail after your visit with us. Thank you!             Your Updated Medication List - Protect others around you: Learn how to safely use, store and throw away your medicines at www.disposemymeds.org.          This list is accurate as of 3/8/18 12:27 PM.  Always use your most recent med list.                   Brand Name Dispense Instructions for use Diagnosis    ARTIFICIAL TEARS Oint     3.5 g    Apply 1 Application to eye At Bedtime    Post-operative state       blood glucose monitoring lancets     408 each    Use to test blood sugar 4 times daily or as directed.  1 box = 102 lancets    Type II diabetes mellitus (H)       blood glucose monitoring test strip    ACCU-CHEK EDINSON PLUS    400 each    Use to test blood sugar 4 times daily    Type II diabetes mellitus (H)       carvedilol 12.5 MG tablet    COREG    180 tablet    Take 1 tablet (12.5 mg) by mouth 2 times daily (with meals)    Liver cirrhosis secondary to ESTRADA (H), Secondary esophageal varices without bleeding (H)       dapagliflozin 10 MG Tabs tablet    FARXIGA    90 tablet    Take 1 tablet (10 mg) by mouth daily    Type 2 diabetes mellitus with  hyperglycemia, with long-term current use of insulin (H)       erythromycin ophthalmic ointment    ROMYCIN    3.5 g    Place 1 Application into the right eye 3 times daily    Post-operative state       hypromellose-dextran Soln ophthalmic solution     1 Bottle    Place 1 drop into the right eye every hour as needed for dry eyes Apply at least 4 times daily and as needed for dry eye    Dry eyes       insulin aspart 100 UNIT/ML injection    NovoLOG FLEXPEN    15 mL    1 unit per 4 Gms CHO at meals and snacks + correction scale of 1 unit per 25 mg/dL over 125. Average daily dose is 75 units    Type II diabetes mellitus (H)       insulin degludec 200 UNIT/ML pen    TRESIBA    36 mL    Take 120 units daily.    Type 2 diabetes mellitus with hyperglycemia, with long-term current use of insulin (H)       insulin pen needle 32G X 4 MM    BD VIKTORIA U/F    150 each    Use 5 per day    Type 2 diabetes mellitus without complication, with long-term current use of insulin (H)       lactulose 10 GM/15ML solution    CHRONULAC    7200 mL    Take 45 mLs (30 g) by mouth 4 times daily    Liver cirrhosis secondary to ESTRADA (H)       OLANZapine 2.5 MG tablet    zyPREXA    90 tablet    Take 1 tablet (2.5 mg) by mouth At Bedtime    Insomnia, unspecified type       omeprazole 40 MG capsule    priLOSEC          potassium chloride SA 20 MEQ CR tablet    K-DUR/KLOR-CON M    90 tablet    Take 1 tablet (20 mEq) by mouth daily    Hypokalemia       pravastatin 10 MG tablet    PRAVACHOL    90 tablet    Take 2 tablets (20 mg) by mouth daily    Hyperlipidemia LDL goal <100       Propylene Glycol-Glycerin 1-0.3 % Soln    ARTIFICIAL TEARS    30 mL    Apply 4 drops to eye every 2 hours (while awake)    Post-operative state       RA VITAMIN B-12 TR 1000 MCG Tbcr   Generic drug:  cyanocobalamin      Take 1,000 mcg by mouth every morning        rifaximin 550 MG Tabs tablet    XIFAXAN    60 tablet    Take 1 tablet (550 mg) by mouth 2 times daily    Hepatic  encephalopathy (H)       spironolactone 25 MG tablet    ALDACTONE    90 tablet    Take 1 tablet (25 mg) by mouth daily    Other ascites       tamsulosin 0.4 MG capsule    FLOMAX          THERAVITE PO      Take 1 tablet by mouth every morning        VITAMIN D-3 PO      Take 2,000 Units by mouth every morning

## 2018-03-19 DIAGNOSIS — K74.69 OTHER CIRRHOSIS OF LIVER (H): Primary | ICD-10-CM

## 2018-03-21 ENCOUNTER — THERAPY VISIT (OUTPATIENT)
Dept: SPEECH THERAPY | Facility: CLINIC | Age: 53
End: 2018-03-21
Payer: MEDICARE

## 2018-03-21 DIAGNOSIS — R29.810 FACIAL WEAKNESS: Primary | ICD-10-CM

## 2018-03-21 DIAGNOSIS — R47.1 DYSARTHRIA: ICD-10-CM

## 2018-03-21 NOTE — PROGRESS NOTES
[facility]          OUTPATIENT SPEECH LANGUAGE PATHOLOGY FACIAL PARALYSIS EVALUATION  PLAN OF TREATMENT FOR OUTPATIENT REHABILITATION    Patient's Last Name, First Name, M.I.  YOB: 1965  rFandy Workman                        Provider s Name: [facility] Medical Record No.  [id number]     Onset Date:  11-02-17    Start of Care Date:  01-03-18   Type:     ___PT  ___OT   _X_SLP    Medical Diagnosis:  Facial Weakness, Dysarthria   Speech Language Pathology Diagnosis:  Dysarthria , Oral Stage Dysphagia, Facial Weakness    Visits from SOC: 1    _________________________________________________________________________________  Plan of Treatment/Functional Goals:               Goals   1. Goal Identifier: Prevention of Corneal Abrasion       Goal Description: Patient will demonstrate complete eye closure on therapist evaluation.  (.5-1 m remaining, improved about 1 mm.)       Target Date: 03/03/18   2. Goal Identifier: Facial Function for Nonverbal Communication, Speech and Oral Stage Swallowing       Goal Description: Patient will demonstrate a 10 point gain on her Facial Grading Score, per therapist judgement, reflecting gains in orofacial resting tone, voluntary movement and minimization of synkinesis if present. (surpassed goal at gain of 23.7/10 points)       Target Date: 03/03/18   3. Goal Identifier: Speech       Goal Description: Patient will report a 25% improvement in ability to be understood over the phone in comparison to status noted on this date of evaluation.       Target Date: 03/03/18     Katie Starkey, SLP                         Mainor King MD         I CERTIFY THE NEED FOR THESE SERVICES FURNISHED UNDER        THIS PLAN OF TREATMENT AND WHILE UNDER MY CARE     (Physician co-signature of this document indicates review and certification of the therapy  "plan).                  Certification Date From:   03-22-18  Certification Date To:  06-19-18                  Initial Assessment        See Epic Evaluation Start of Care Date: 01-03=18               Speech Pathology/Facial Paralysis Progress Summary - 03/21/18 1400   Signing Clinician's Name / Credentials   Signing clinician's name /credentials JEAN Dixon, SLP   Session Number   Session Number 4   Quick Add   Facial Paralysis Facial Paralysis   SLP Medicare Only G-code   G-code Other SLP Functional Limitation   Other SLP Functional Limitation   Other SLP Functional Limitation current status  (eval/re-eval & every progress note) CK: 40-59% impairment   Other SLP Functional Limitation Modifier Rationale Facial weakness, impaired speech and oral stage swallowing   Other SLP Functional Limitation Goal,  (eval/re-eval, every progress note & discharge) CL: 60-79% impairment   Subjective Report   Subjective Report \"It kind of stabilized since I was here. I worked on the exercises but forgot to do the \"stinky smile\" - about 5 days/week.  (Tearing more. Using drops at night.)   Resting Symmetry Location    Eyel[facility] [place] Weight   Nasolabial Fold Normal   Lips Normal   Voluntary Movement: Minimal Effort Eye Closure    Minimal Effort Eye Closure Complete Yes  (Improved)   Minimal Effort Eye Closure Lack in mm    General Severity of Synkinesis none   Voluntary Movement: Forehead   Forehead Elevation  Strength Rating % 65%  (improved)   General Severity of Synkinesis none   Voluntary Movement: Open Mouth Smile   Open Mouth Smile Strength Rating% 75-80%  (signficantly improved)   General Severity of Synkinesis none   Voluntary Movement: Closed Mouth Smile   Closed Mouth Smile Strength Rating % 90%  (signficantly improved)   General Severity of Synkinesis none   Voluntary Movement: Snarl   Snarl Strength Rating % 90%  (significantly improved)   General Severity of Synkinesis none   Voluntary Movement: " Pucker   Pucker Strength Rating % 95%   General Severity of Synkinesis none   Facial Paralysis Goals   Facial Paralysis Goals 1;2;3   Facial Paralysis Goal 1   Goal Identifier Prevention of Corneal Abrasion   Goal Description Patient will demonstrate complete eye closure on therapist evaluation.   (.5-1 m remaining, improved about 1 mm.)   Target Date 03/03/18   Date Met 03/21/18   Facial Paralysis Goal 2   Goal Identifier Facial Function for Nonverbal Communication, Speech and Oral Stage Swallowing   Goal Description Patient will demonstrate a 10 point gain on her Facial Grading Score, per therapist judgement, reflecting gains in orofacial resting tone, voluntary movement and minimization of synkinesis if present.  (surpassed goal at gain of 23.7/10 points)   Target Date 03/03/18   Date Met 03/21/18  (continuing for additional 10 points in 2 months)   Facial Paralysis Goal 3   Goal Identifier Speech   Goal Description Patient will report a 25% improvement in ability to be understood over the phone in comparison to status noted on this date of evaluation.   Target Date 03/03/18   Date Met (continuing for additional 25%)   Treatment Interventions    Treatment Interventions Treatment Swallow/Oral dysfunction;Treatment Speech/Lang/Voice;Facial Paralysis-Massage/Stretch;Facial Paralysis-Strengthening   Treatment Swallow/Oral dysfunction   Skilled Intervention Other   Patient Response Demonstrated understanding   Treatment Detail Instruction in and practice of massage strategies to reduce orofacial discomfort/synkinesis and increase ROM for oral phase swallowing.  (See below)   Treatment Speech/Lang/Voice   Skilled Intervention Provided written and verbal information on.;Provided feedback on performance of tasks;Facilitated respiratory, laryngeal, oral integration   Patient Response Demonstrated understanding   Treatment Detail Instruction in orofacial/neuromuscular retraining exercises to improve labial and buccal tone  for improved speech production and nonverbal communication.  (See below)   Strengthening   Minutes 45   Skilled Intervention Reassessed status. Completed Facial Grading Score. Instruction in and practice of massage strategies to reduce orofacial discomfort/synkinesis and increase ROM for speech, oral phase swallowing and nonverbal communication.   Patient Response Demonstrated understanding   Progress Excellent gains in resting tone and voluntary movements.    Forehead Elevation Active Assisted  (Reinstructed )   Eye Closure Active ;Other  (Include squeeze - advanced)   Snarl Active Assisted  (Re-instructed )   Snarl Plus Smile (Put on hold in favor of AA smile)   Smile Open Mouth Active Assisted   Assessments Completed   Facial Grading Score 88.2/100  (Excellent progress.)   Education   Learner Patient   Readiness Eager   Method Booklet/handout;Explanation;Demonstration   Response Verbalizes understanding;Demonstrates understanding   Plan   Home program See Strengthening section above.   Plan for next session Reassess status and advance home practice as indicated.    Total Session Time   Total Treatment Time (sum of timed and untimed services) 45   AMBULATORY CLINICS ONLY-MEDICAL AND TREATMENT DIAGNOSIS   Medical Diagnosis Facial Weakness; Dysarthria   Swallowing Diagnosis Oral Stage Dysphagia  (Per patient report of swallowing issues noted above.)

## 2018-03-21 NOTE — MR AVS SNAPSHOT
After Visit Summary   3/21/2018    Frandy Workman    MRN: [id number]           Patient Information     Date Of Birth          1965        Visit Information        Provider Department      3/21/2018 2:15 PM Katie Starkey SLP [facility]        Today's Diagnoses     Facial weakness    -  1    Dysarthria           Follow-ups after your visit        Your next 10 appointments already scheduled     Apr 11, 2018  2:30 PM CDT   (Arrive by 2:15 PM)   RETURN ENDOCRINE with Jennifer Wilcox MD   [facility] ([facility])    [address] [place] [address]  3rd [facility] [id number] [phone]            Apr 16, 2018 12:00 PM CDT   Lab with  LAB   [facility] ([facility])    [address] [place] [address]  1st [facility] [id number] [phone]            Apr 16, 2018  1:00 PM CDT   (Arrive by 12:45 PM)   Return General Liver with Santi Dotson MD   [facility] Hepatology ([facility])    [address] [place] [address]  [address]  [facility] [place] [id number] [phone]            May 02, 2018 11:45 AM CDT   (Arrive by 11:30 AM)   Fac Paral Treatment with SALBADOR Mcginnis   [facility] ([facility])    [address] [place] [address]  4th [facility] [id number] [phone]            May 16, 2018  2:00 PM CDT   (Arrive by 1:45 PM)   Return Visit with Milana Malave MD   [facility]ose [facility] ([facility])    [address] [place] [address]  4th [facility] [id number] [phone]            Sep 13, 2018 12:30 PM CDT   (Arrive by 12:15 PM)   Return Visit with Lamin Gonzalez DPM   [facility] ([facility])    [address] [place] [id number] [phone]              Who to contact     Please call your clinic at [phone] to:    Ask questions about your  health    Make or cancel appointments    Discuss your medicines    Learn about your test results    Speak to your doctor            Additional Information About Your Visit        [employer] Information     [email or web address] gives you secure access to your electronic health record. If you see a primary care provider, you can also send messages to your care team and make appointments. If you have questions, please call your primary care clinic.  If you do not have a primary care provider, please call [phone] and they will assist you.      [email or web address] is an electronic gateway that provides easy, online access to your medical records. With [email or web address], you can request a clinic appointment, read your test results, renew a prescription or communicate with your care team.     To access your existing account, please contact your [facility] or call [phone] for assistance.        Care EveryWhere ID     This is your Care EveryWhere ID. This could be used by other organizations to access your [facility] medical records  [id number]         Blood Pressure from Last 3 Encounters:   02/08/18 105/67   12/08/17 111/72   11/20/17 104/48    Weight from Last 3 Encounters:   02/08/18 82.1 kg (181 lb)   01/29/18 82.6 kg (182 lb)   01/24/18 82.6 kg (182 lb)              Today, you had the following     No orders found for display         Today's Medication Changes          These changes are accurate as of 3/21/18  3:05 PM.  If you have any questions, ask your nurse or doctor.               These medicines have changed or have updated prescriptions.        Dose/Directions    dapagliflozin 10 MG Tabs tablet   Commonly known as:  FARXIGA   This may have changed:  when to take this   Used for:  Type 2 diabetes mellitus with hyperglycemia, with long-term current use of insulin (H)        Dose:  10 mg   Take 1 tablet (10 mg) by mouth daily   Quantity:  90 tablet   Refills:  3       insulin degludec 200 UNIT/ML pen   Commonly  known as:  TRESIBA   This may have changed:    - how much to take  - how to take this  - when to take this  - additional instructions   Used for:  Type 2 diabetes mellitus with hyperglycemia, with long-term current use of insulin (H)        Take 120 units daily.   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 (H)        Dose:  30 g   Take 45 mLs (30 g) by mouth 4 times daily   Quantity:  7200 mL   Refills:  11       potassium chloride SA 20 MEQ CR tablet   Commonly known as:  K-DUR/KLOR-CON M   This may have changed:  when to take this   Used for:  Hypokalemia        Dose:  20 mEq   Take 1 tablet (20 mEq) by mouth daily   Quantity:  90 tablet   Refills:  3       pravastatin 10 MG tablet   Commonly known as:  PRAVACHOL   This may have changed:    - how much to take  - when to take this   Used for:  Hyperlipidemia LDL goal <100        Dose:  20 mg   Take 2 tablets (20 mg) by mouth daily   Quantity:  90 tablet   Refills:  3       spironolactone 25 MG tablet   Commonly known as:  ALDACTONE   This may have changed:  when to take this   Used for:  Other ascites        Dose:  25 mg   Take 1 tablet (25 mg) by mouth daily   Quantity:  90 tablet   Refills:  1                Primary Care Provider Office Phone # Fax #    Natalie Mitzi Russell [phone]       [address] [place] [address] [address] [place] [address]        Equal Access to Services     MINDI RODRIGUEZ AH: Amelia patel Somichael, waaxda luqjacek, qaybta kaalmada osbaldoyaluis, emy vuong. So [facility] [phone].    ATENCIÓN: Si habla español, tiene a hanley disposición servicios gratuitos de asistencia lingüística. Llame al [phone].    We comply with applicable federal civil rights laws and [place] laws. We do not discriminate on the basis of race,  color, national origin, age, disability, sex, sexual orientation, or gender identity.            Thank you!     Thank you for choosing [facility]  for your care. Our goal is always to provide you with excellent care. Hearing back from our patients is one way we can continue to improve our services. Please take a few minutes to complete the written survey that you may receive in the mail after your visit with us. Thank you!             Your Updated Medication List - Protect others around you: Learn how to safely use, store and throw away your medicines at www.disposemymeds.org.          This list is accurate as of 3/21/18  3:05 PM.  Always use your most recent med list.                   Brand Name Dispense Instructions for use Diagnosis    ARTIFICIAL TEARS Oint     3.5 g    Apply 1 Application to eye At Bedtime    Post-operative state       blood glucose monitoring lancets     408 each    Use to test blood sugar 4 times daily or as directed.  1 box = 102 lancets    Type II diabetes mellitus (H)       blood glucose monitoring test strip    ACCU-CHEK EDINSON PLUS    400 each    Use to test blood sugar 4 times daily    Type II diabetes mellitus (H)       capsaicin 0.025 % Crea cream    ZOSTRIX    60 g    To feet 2-3 times daily as needed.    Type II or unspecified type diabetes mellitus with neurological manifestations, not stated as uncontrolled(250.60) (H)       carvedilol 12.5 MG tablet    COREG    180 tablet    Take 1 tablet (12.5 mg) by mouth 2 times daily (with meals)    Liver cirrhosis secondary to ESTRADA (H), Secondary esophageal varices without bleeding (H)       dapagliflozin 10 MG Tabs tablet    FARXIGA    90 tablet    Take 1 tablet (10 mg) by mouth daily    Type 2 diabetes mellitus with hyperglycemia, with long-term current use of insulin (H)       erythromycin ophthalmic ointment    ROMYCIN    3.5 g    Place 1 Application into the right eye 3 times daily    Post-operative state       hypromellose-dextran  Soln ophthalmic solution     1 Bottle    Place 1 drop into the right eye every hour as needed for dry eyes Apply at least 4 times daily and as needed for dry eye    Dry eyes       insulin aspart 100 UNIT/ML injection    NovoLOG FLEXPEN    15 mL    1 unit per 4 Gms CHO at meals and snacks + correction scale of 1 unit per 25 mg/dL over 125. Average daily dose is 75 units    Type II diabetes mellitus (H)       insulin degludec 200 UNIT/ML pen    TRESIBA    36 mL    Take 120 units daily.    Type 2 diabetes mellitus with hyperglycemia, with long-term current use of insulin (H)       insulin pen needle 32G X 4 MM    BD VIKTORIA U/F    150 each    Use 5 per day    Type 2 diabetes mellitus without complication, with long-term current use of insulin (H)       lactulose 10 GM/15ML solution    CHRONULAC    7200 mL    Take 45 mLs (30 g) by mouth 4 times daily    Liver cirrhosis secondary to ESTRADA (H)       OLANZapine 2.5 MG tablet    zyPREXA    90 tablet    Take 1 tablet (2.5 mg) by mouth At Bedtime    Insomnia, unspecified type       omeprazole 40 MG capsule    priLOSEC          potassium chloride SA 20 MEQ CR tablet    K-DUR/KLOR-CON M    90 tablet    Take 1 tablet (20 mEq) by mouth daily    Hypokalemia       pravastatin 10 MG tablet    PRAVACHOL    90 tablet    Take 2 tablets (20 mg) by mouth daily    Hyperlipidemia LDL goal <100       Propylene Glycol-Glycerin 1-0.3 % Soln    ARTIFICIAL TEARS    30 mL    Apply 4 drops to eye every 2 hours (while awake)    Post-operative state       RA VITAMIN B-12 TR 1000 MCG Tbcr   Generic drug:  cyanocobalamin      Take 1,000 mcg by mouth every morning        rifaximin 550 MG Tabs tablet    XIFAXAN    60 tablet    Take 1 tablet (550 mg) by mouth 2 times daily    Hepatic encephalopathy (H)       spironolactone 25 MG tablet    ALDACTONE    90 tablet    Take 1 tablet (25 mg) by mouth daily    Other ascites       tamsulosin 0.4 MG capsule    FLOMAX          THERAVITE PO      Take 1 tablet by mouth  every morning        VITAMIN D-3 PO      Take 2,000 Units by mouth every morning

## 2018-03-25 DIAGNOSIS — E11.9 TYPE II DIABETES MELLITUS (H): ICD-10-CM

## 2018-03-26 RX ORDER — INSULIN ASPART 100 [IU]/ML
INJECTION, SOLUTION INTRAVENOUS; SUBCUTANEOUS
Qty: 30 ML | Refills: 4 | Status: SHIPPED | OUTPATIENT
Start: 2018-03-26 | End: 2018-11-21

## 2018-04-03 ASSESSMENT — ENCOUNTER SYMPTOMS
DOUBLE VISION: 0
NECK PAIN: 0
TROUBLE SWALLOWING: 0
ALTERED TEMPERATURE REGULATION: 0
POLYPHAGIA: 1
MYALGIAS: 0
NECK MASS: 0
SORE THROAT: 0
BACK PAIN: 1
SINUS PAIN: 1
EYE PAIN: 0
MUSCLE CRAMPS: 0
MUSCLE WEAKNESS: 1
SINUS CONGESTION: 1
EYE WATERING: 1
WEIGHT GAIN: 0
EYE IRRITATION: 1
EYE REDNESS: 1
TASTE DISTURBANCE: 0
ARTHRALGIAS: 1
DECREASED APPETITE: 1
WEIGHT LOSS: 0
STIFFNESS: 1
NIGHT SWEATS: 1
HALLUCINATIONS: 0
INCREASED ENERGY: 1
CHILLS: 1
JOINT SWELLING: 0
POLYDIPSIA: 1
FATIGUE: 1
HOARSE VOICE: 1

## 2018-04-11 ENCOUNTER — OFFICE VISIT (OUTPATIENT)
Dept: ENDOCRINOLOGY | Facility: CLINIC | Age: 53
End: 2018-04-11
Payer: MEDICARE

## 2018-04-11 VITALS
SYSTOLIC BLOOD PRESSURE: 104 MMHG | BODY MASS INDEX: 28.03 KG/M2 | DIASTOLIC BLOOD PRESSURE: 70 MMHG | WEIGHT: 187.1 LBS | HEART RATE: 65 BPM

## 2018-04-11 DIAGNOSIS — E11.9 TYPE 2 DIABETES MELLITUS WITHOUT COMPLICATION, WITH LONG-TERM CURRENT USE OF INSULIN (H): ICD-10-CM

## 2018-04-11 DIAGNOSIS — G62.9 NEUROPATHY: ICD-10-CM

## 2018-04-11 DIAGNOSIS — E11.3293 TYPE 2 DIABETES MELLITUS WITH MILD NONPROLIFERATIVE RETINOPATHY OF BOTH EYES WITHOUT MACULAR EDEMA, UNSPECIFIED LONG TERM INSULIN USE STATUS: Primary | ICD-10-CM

## 2018-04-11 DIAGNOSIS — Z79.4 TYPE 2 DIABETES MELLITUS WITHOUT COMPLICATION, WITH LONG-TERM CURRENT USE OF INSULIN (H): ICD-10-CM

## 2018-04-11 LAB — HBA1C MFR BLD: 8.1 % (ref 4.3–6)

## 2018-04-11 RX ORDER — PEN NEEDLE, DIABETIC 32GX 5/32"
NEEDLE, DISPOSABLE MISCELLANEOUS
Qty: 300 EACH | Refills: 3 | Status: SHIPPED | OUTPATIENT
Start: 2018-04-11 | End: 2020-01-21

## 2018-04-11 RX ORDER — PERPHENAZINE 16 MG
TABLET ORAL
Qty: 90 CAPSULE | Refills: 3 | Status: SHIPPED | OUTPATIENT
Start: 2018-04-11 | End: 2018-04-16

## 2018-04-11 ASSESSMENT — PAIN SCALES - GENERAL: PAINLEVEL: NO PAIN (0)

## 2018-04-11 NOTE — MR AVS SNAPSHOT
After Visit Summary   4/11/2018    Frandy Workman    MRN: [id number]           Patient Information     Date Of Birth          1965        Visit Information        Provider Department      4/11/2018 2:30 PM Jennifer Wilcox MD [facility]        Today's Diagnoses     Type 2 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, unspecified long term insulin use status (H)    -  1    Neuropathy        Type 2 diabetes mellitus without complication, with long-term current use of insulin (H)          Care Instructions    Increase carb ratio to 10  Continue Tresiba.   Lab before next visit.           Follow-ups after your visit        Follow-up notes from your care team     Return in about 4 months (around 8/11/2018).      Your next 10 appointments already scheduled     Apr 16, 2018 12:00 PM CDT   Lab with [facility]   [facility] ([facility])    [address] [place] [id number] [phone]            Apr 16, 2018  1:00 PM CDT   (Arrive by 12:45 PM)   Return General Liver with Santi Dotson MD   [facility] Hepatology ([facility])    [address] [place] [address]  [address]  [facility] [id number] [phone]            May 02, 2018 11:45 AM CDT   (Arrive by 11:30 AM)   Fac Paral Treatment with SALBADOR Mcginnis   [facility] ([facility])    [address] [place] [address]  4th [facility] [id number] [phone]            May 16, 2018  2:00 PM CDT   (Arrive by 1:45 PM)   Return Visit with Milana Malave MD   [facility] Nose [facility] Throat ([facility])    [address] [place] [address]  4th [facility] [id number] [phone]            Aug 08, 2018 12:45 PM CDT   LAB with [facility]   [facility] ([facility])    [address] [place] [id number]    [phone]           Please do not eat 10-12 hours before your appointment if you are coming in fasting for labs on lipids, cholesterol, or glucose (sugar). This does not apply to pregnant women. Water, hot tea and black coffee (with nothing added) are okay. Do not drink other fluids, diet soda or chew gum.            Aug 08, 2018  1:30 PM CDT   (Arrive by 1:15 PM)   RETURN ENDOCRINE with Jennifer Wilcox MD   [facility] Endocr[facility] ([facility])    [address] [place] [id number] [phone]            Sep 13, 2018 12:30 PM CDT   (Arrive by 12:15 PM)   Return Visit with Lamin Gonzalez DPM   [facility] End[facility] ([facility])    [address] [place] [id number] [phone]              Future tests that were ordered for you today     Open Future Orders        Priority Expected Expires Ordered    TSH Routine 7/10/2018 10/8/2018 4/11/2018    T4 free Routine 7/10/2018 4/6/2019 4/11/2018    Albumin Random Urine Quantitative with Creat Ratio Routine 7/10/2018 4/11/2019 4/11/2018    ALT Routine 7/10/2018 4/11/2019 4/11/2018    CK total Routine 7/10/2018 4/11/2019 4/11/2018    Hematocrit Routine 7/10/2018 4/11/2019 4/11/2018    Hemoglobin A1c Routine 7/10/2018 4/11/2019 4/11/2018    Lipid panel reflex to direct LDL Fasting Routine 7/10/2018 4/11/2019 4/11/2018            Who to contact     Please call your clinic at [phone] to:    Ask questions about your health    Make or cancel appointments    Discuss your medicines    Learn about your test results    Speak to your doctor            Additional Information About Your Visit        MyChart Information     [employer] gives you secure access to your electronic health record. If you see a primary care provider, you can also send messages to your care team and make appointments. If you have questions, please call your primary care clinic.  If you do  not have a primary care provider, please call [phone] and they will assist you.      [employer] is an electronic gateway that provides easy, online access to your medical records. With [employer], you can request a clinic appointment, read your test results, renew a prescription or communicate with your care team.     To access your existing account, please contact your [facility] or call [phone] for assistance.        Care EveryWhere ID     This is your Care EveryWhere ID. This could be used by other organizations to access your [facility] medical records  [id number]        Your Vitals Were     Pulse BMI (Body Mass Index)                65 28.03 kg/m2           Blood Pressure from Last 3 Encounters:   04/11/18 104/70   02/08/18 105/67   12/08/17 111/72    Weight from Last 3 Encounters:   04/11/18 84.9 kg (187 lb 1.6 oz)   02/08/18 82.1 kg (181 lb)   01/29/18 82.6 kg (182 lb)                 Today's Medication Changes          These changes are accurate as of 4/11/18  3:08 PM.  If you have any questions, ask your nurse or doctor.               Start taking these medicines.        Dose/Directions    alpha-lipoic acid 600 MG Caps   Used for:  Neuropathy   Started by:  Jennifer Wilcox MD        1 cap daily   Quantity:  90 capsule   Refills:  3       BD VIKTORIA U/F 32G X 4 MM   Used for:  Type 2 diabetes mellitus without complication, with long-term current use of insulin (H)   Generic drug:  insulin pen needle   Replaces:  insulin pen needle 32G X 4 MM   Started by:  Jennifer Wilcox MD        Use 5 per day   Quantity:  300 each   Refills:  3         These medicines have changed or have updated prescriptions.        Dose/Directions    dapagliflozin 10 MG Tabs tablet   Commonly known as:  FARXIGA   This may have changed:  when to take this   Used for:  Type 2 diabetes mellitus with hyperglycemia, with long-term current use of insulin (H)        Dose:  10 mg    Take 1 tablet (10 mg) by mouth daily   Quantity:  90 tablet   Refills:  3       insulin degludec 200 UNIT/ML pen   Commonly known as:  TRESIBA   This may have changed:    - how much to take  - how to take this  - when to take this  - additional instructions   Used for:  Type 2 diabetes mellitus with hyperglycemia, with long-term current use of insulin (H)        Take 120 units daily.   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 (H)        Dose:  30 g   Take 45 mLs (30 g) by mouth 4 times daily   Quantity:  7200 mL   Refills:  11       potassium chloride SA 20 MEQ CR tablet   Commonly known as:  K-DUR/KLOR-CON M   This may have changed:  when to take this   Used for:  Hypokalemia        Dose:  20 mEq   Take 1 tablet (20 mEq) by mouth daily   Quantity:  90 tablet   Refills:  3       pravastatin 10 MG tablet   Commonly known as:  PRAVACHOL   This may have changed:    - how much to take  - when to take this   Used for:  Hyperlipidemia LDL goal <100        Dose:  20 mg   Take 2 tablets (20 mg) by mouth daily   Quantity:  90 tablet   Refills:  3       spironolactone 25 MG tablet   Commonly known as:  ALDACTONE   This may have changed:  when to take this   Used for:  Other ascites        Dose:  25 mg   Take 1 tablet (25 mg) by mouth daily   Quantity:  90 tablet   Refills:  1         Stop taking these medicines if you haven't already. Please contact your care team if you have questions.     insulin pen needle 32G X 4 MM   Commonly known as:  BD VIKTORIA U/F   Replaced by:  BD VIKTORIA U/F 32G X 4 MM   Stopped by:  Jennifer Wilcox MD                Where to get your medicines      These medications were sent to [facility] [address] [place] [address] & [address], [place] [address]      Phone:  [phone]     alpha-lipoic acid 600 MG Caps    BD VIKTORIA U/F 32G X 4 MM                Primary Care Provider Office Phone # Fax #    Natalie Mitzi Andrés Russell [phone]       [address] [place] [address]  [facility] [address]        Equal Access to Services     MINDI RODRIGUEZ : Hadii aad ku hadasho Soomaali, waaxda luqadaha, qaybta kaalmada adeegyada, waxay wiliamin hayaan adeeg khmelodysh lashannan . So [facility] [phone].    ATENCIÓN: Si habla español, tiene a hanley disposición servicios gratuitos de asistencia lingüística. Rl al [phone].    We comply with applicable federal civil rights laws and [place] laws. We do not discriminate on the basis of race, color, national origin, age, disability, sex, sexual orientation, or gender identity.            Thank you!     Thank you for choosing [facility] ENDO[facility]  for your care. Our goal is always to provide you with excellent care. Hearing back from our patients is one way we can continue to improve our services. Please take a few minutes to complete the written survey that you may receive in the mail after your visit with us. Thank you!             Your Updated Medication List - Protect others around you: Learn how to safely use, store and throw away your medicines at www.disposemymeds.org.          This list is accurate as of 4/11/18  3:08 PM.  Always use your most recent med list.                   Brand Name Dispense Instructions for use Diagnosis    alpha-lipoic acid 600 MG Caps     90 capsule    1 cap daily    Neuropathy       ARTIFICIAL TEARS Oint     3.5 g    Apply 1 Application to eye At Bedtime    Post-operative state       BD VIKTORIA U/F 32G X 4 MM   Generic drug:  insulin pen needle     300 each    Use 5 per day    Type 2 diabetes mellitus without complication, with long-term current use of insulin (H)       blood glucose monitoring lancets     408 each    Use to test blood sugar 4 times daily or as directed.  1 box = 102 lancets     Type II diabetes mellitus (H)       blood glucose monitoring test strip    ACCU-CHEK EDINSON PLUS    400 each    Use to test blood sugar 4 times daily    Type II diabetes mellitus (H)       capsaicin 0.025 % Crea cream    ZOSTRIX    60 g    To feet 2-3 times daily as needed.    Type II or unspecified type diabetes mellitus with neurological manifestations, not stated as uncontrolled(250.60) (H)       carvedilol 12.5 MG tablet    COREG    180 tablet    Take 1 tablet (12.5 mg) by mouth 2 times daily (with meals)    Liver cirrhosis secondary to ESTRADA (H), Secondary esophageal varices without bleeding (H)       dapagliflozin 10 MG Tabs tablet    FARXIGA    90 tablet    Take 1 tablet (10 mg) by mouth daily    Type 2 diabetes mellitus with hyperglycemia, with long-term current use of insulin (H)       erythromycin ophthalmic ointment    ROMYCIN    3.5 g    Place 1 Application into the right eye 3 times daily    Post-operative state       hypromellose-dextran Soln ophthalmic solution     1 Bottle    Place 1 drop into the right eye every hour as needed for dry eyes Apply at least 4 times daily and as needed for dry eye    Dry eyes       insulin degludec 200 UNIT/ML pen    TRESIBA    36 mL    Take 120 units daily.    Type 2 diabetes mellitus with hyperglycemia, with long-term current use of insulin (H)       lactulose 10 GM/15ML solution    CHRONULAC    7200 mL    Take 45 mLs (30 g) by mouth 4 times daily    Liver cirrhosis secondary to ESTRADA (H)       NovoLOG FLEXPEN 100 UNIT/ML injection   Generic drug:  insulin aspart     30 mL    INJECT 1 UNIT PER 4 GRAMS OF CARBS AT MEALS AND SNACKS CORRECTION SCALE OF 1 UNITS PER 25 OVER [address]. AVE DOSE 75UNITS PER DAY    Type II diabetes mellitus (H)       OLANZapine 2.5 MG tablet    zyPREXA    90 tablet    Take 1 tablet (2.5 mg) by mouth At Bedtime    Insomnia, unspecified type       omeprazole 40 MG capsule    priLOSEC          potassium chloride SA 20 MEQ CR tablet    K-NICK/KLOR-CON M     90 tablet    Take 1 tablet (20 mEq) by mouth daily    Hypokalemia       pravastatin 10 MG tablet    PRAVACHOL    90 tablet    Take 2 tablets (20 mg) by mouth daily    Hyperlipidemia LDL goal <100       Propylene Glycol-Glycerin 1-0.3 % Soln    ARTIFICIAL TEARS    30 mL    Apply 4 drops to eye every 2 hours (while awake)    Post-operative state       RA VITAMIN B-12 TR 1000 MCG Tbcr   Generic drug:  cyanocobalamin      Take 1,000 mcg by mouth every morning        rifaximin 550 MG Tabs tablet    XIFAXAN    60 tablet    Take 1 tablet (550 mg) by mouth 2 times daily    Hepatic encephalopathy (H)       spironolactone 25 MG tablet    ALDACTONE    90 tablet    Take 1 tablet (25 mg) by mouth daily    Other ascites       tamsulosin 0.4 MG capsule    FLOMAX          THERAVITE PO      Take 1 tablet by mouth every morning        VITAMIN D-3 PO      Take 2,000 Units by mouth every morning

## 2018-04-11 NOTE — PROGRESS NOTES
Assessment/Treatment Plan:      Miller is a 53 year old male here for a follow up     1. Type 2 Diabetes since early 30s with hyperglycemia and neuropathy.     Regimen  Tresiba 100 units daily   Novolog 1 unit per 10 gm carb [at present 15] plus 1 unit per 50 over 100 (start at meals again)   Farxiga 10 mg daily (restart)  Bolus dose were reduced because of hypoglycemia. His sugars are now elevated.     A1c today was 8.1 indicating worsening control.   Benjamin Flash -- he does all the dose calculations from the present meter and he does not want to change it.      Barriers: Stress eating   Lack of testing and bolusing. [ less needles]    Not using prandial aspart  BG testing 3 times a day, with meal time and correction insulin   He will restart doing this    Diabetic neuropathy  Started alpha lipoic acid 600 mg daily.     Hyperlipidemia   Pravastatin 20 mg daily   Check LDL on follow up.     Jennifer Wilcox MD  [id number]  Endocrinology Service        =================================================    Endocrinology Clinic Visit    Chief Complaint: No chief complaint on file.       Subjective:         HPI:  53 / male with history of ESTRADA with liver cirrhosis who is seen in follow up for T2DM - uncontrolled.     He has had diabetes since 2001. He was intially started on Metformin but after diagnosis of liver failure, this was discontinued.     Neuropathy - tingling in feet bilateral - worse since last visit.   Nephropathy - none  Retinopathy - no [ due]  Hypoglycemia - none    Prevention  On Statin.     No change in Bowel habits. On lactulose   Appetite unchanged.   Weight gain  No neck pain or difficulty swallowing.     A1c is not a reliable indicator of glycemic control: Anemia, does not correlate with BG levels.   BG is high all day long: diet with high carb in it. He avoided snacking and lunches  ?? Poor absorption of Lantus high higher dose. -- This was changed to Tresiba  Lack of oral medication use due to liver  cirrhosis. Responded well to Tresiba      Download from her meter shows:  Average 216, sd 94  Recent values mostly high 200s or low 300s in am, improved later in the day.     Insulin regimen  Tresiba 100 units daily  Aspart 1 units per 10 gm CHO not doing regularly  Correction unclear what he is following. [? 50 mg/dl over 100]              Allergies   Allergen Reactions     Codeine Other (See Comments)     Cannot take due to liver  Cannot tolerate oral narcotics       Current Outpatient Prescriptions   Medication Sig Dispense Refill     NOVOLOG FLEXPEN 100 UNIT/ML soln INJECT 1 UNIT PER 4 GRAMS OF CARBS AT MEALS AND SNACKS CORRECTION SCALE OF 1 UNITS PER 25 OVER [address]. AVE DOSE 75UNITS PER DAY 30 mL 4     capsaicin (ZOSTRIX) 0.025 % CREA cream To feet 2-3 times daily as needed. 60 g 6     OLANZapine (ZYPREXA) 2.5 MG tablet Take 1 tablet (2.5 mg) by mouth At Bedtime 90 tablet 1     insulin pen needle (BD VIKTORIA U/F) 32G X 4 MM Use 5 per day 150 each 11     tamsulosin (FLOMAX) 0.4 MG capsule   2     omeprazole (PRILOSEC) 40 MG capsule   2     erythromycin (ROMYCIN) ophthalmic ointment Place 1 Application into the right eye 3 times daily 3.5 g 1     Artificial Tear Ointment (ARTIFICIAL TEARS) OINT Apply 1 Application to eye At Bedtime 3.5 g 11     Propylene Glycol-Glycerin (ARTIFICIAL TEARS) 1-0.3 % SOLN Apply 4 drops to eye every 2 hours (while awake) 30 mL 11     hypromellose-dextran (ARTIFICAL TEARS) SOLN ophthalmic solution Place 1 drop into the right eye every hour as needed for dry eyes Apply at least 4 times daily and as needed for dry eye 1 Bottle 3     dapagliflozin (FARXIGA) 10 MG TABS tablet Take 1 tablet (10 mg) by mouth daily (Patient taking differently: Take 10 mg by mouth every morning ) 90 tablet 3     pravastatin (PRAVACHOL) 10 MG tablet Take 2 tablets (20 mg) by mouth daily (Patient taking differently: Take 10 mg by mouth every morning ) 90 tablet 3     blood glucose monitoring (ACCU-CHEK EDINSON PLUS)  test strip Use to test blood sugar 4 times daily 400 each 3     blood glucose monitoring (ACCU-CHEK FASTCLIX) lancets Use to test blood sugar 4 times daily or as directed.  1 box = 102 lancets 408 each 3     rifaximin (XIFAXAN) 550 MG TABS tablet Take 1 tablet (550 mg) by mouth 2 times daily 60 tablet 11     lactulose (CHRONULAC) 10 GM/15ML solution Take 45 mLs (30 g) by mouth 4 times daily (Patient taking differently: Take 60 g by mouth 4 times daily 2-6 TIMES A DAY) 7200 mL 11     insulin degludec (TRESIBA) 200 UNIT/ML pen Take 120 units daily. (Patient taking differently: Inject 110 Units Subcutaneous every morning Take 110 units dailY AS OF 10/19/17) 36 mL 3     spironolactone (ALDACTONE) 25 MG tablet Take 1 tablet (25 mg) by mouth daily (Patient taking differently: Take 25 mg by mouth every morning ) 90 tablet 1     carvedilol (COREG) 12.5 MG tablet Take 1 tablet (12.5 mg) by mouth 2 times daily (with meals) 180 tablet 3     potassium chloride SA (K-DUR/KLOR-CON M) 20 MEQ CR tablet Take 1 tablet (20 mEq) by mouth daily (Patient taking differently: Take 20 mEq by mouth every morning ) 90 tablet 3     Cholecalciferol (VITAMIN D-3 PO) Take 2,000 Units by mouth every morning        cyanocobalamin (RA VITAMIN B-12 TR) 1000 MCG TBCR Take 1,000 mcg by mouth every morning        Multiple Vitamin (THERAVITE PO) Take 1 tablet by mouth every morning          Review of Systems   No eye symptoms  No headache, change in vision, loss of peripheral vision  No neck pain, no other lumps in the neck  No change in breathing    No change in swallowing.    No swelling in extremities  No change in mental status.    Other ROS that were obtained were negative.         Past Medical History:   Diagnosis Date     Anemia 2013    Low blood plates current is 37     BPH (benign prostatic hyperplasia)      Cholelithiasis      Conductive hearing loss 8/16/2017    Have a lump on my right side of my face.  Had wax discharge     Depressive  disorder 1986    Suffer effects throughout life     Gastroesophageal reflux disease 12/1/2014    Being treated with Prilosac     Hepatitis 2014    Diagnosed with schrosis ESTRADA in 2014.  Suffer from hepatatie     Hyperlipidemia      Liver cirrhosis secondary to ESTRADA (H)      Thrombocytopenia (H)      Type II diabetes mellitus (H)        Past Surgical History:   Procedure Laterality Date     ESOPHAGOSCOPY, GASTROSCOPY, DUODENOSCOPY (EGD), COMBINED N/A 11/17/2016    Procedure: COMBINED ESOPHAGOSCOPY, GASTROSCOPY, DUODENOSCOPY (EGD);  Surgeon: Santi Rosas MD;  Location:  GI     ESOPHAGOSCOPY, GASTROSCOPY, DUODENOSCOPY (EGD), COMBINED N/A 11/17/2017    Procedure: COMBINED ESOPHAGOSCOPY, GASTROSCOPY, DUODENOSCOPY (EGD);  EGD;  Surgeon: Santi Rosas MD;  Location:  GI     HEAD & NECK SURGERY       IMPLANT GOLD WEIGHT EYELID Right 11/16/2017    Procedure: IMPLANT WEIGHT EYELID;  Right Upper Eyelid Weight, right tarsal strip lower eyelid;  Surgeon: Milana Malave MD;  [facility]: [facility]     KNEE SURGERY Left      ORTHOPEDIC SURGERY       PAROTIDECTOMY, RADICAL NECK DISSECTION Right 11/2/2017    Procedure: PAROTIDECTOMY, RADICAL NECK DISSECTION;  Right Superfacial Parotidectomy , Facial nerve repair. with [facility] facial nerve monitor.;  Surgeon: Asiya Morgan MD;  Location: [facility] OR     PICC INSERTION Left 11/06/2017    4fr SL BioFlo PICC, 44cm in the L basilic vein w/ tip in the low SVC     VASCULAR SURGERY         Family History   Problem Relation Age of Onset     Prostate Cancer Maternal Grandfather      Substance Abuse Maternal Grandfather      Alcohol     Colon Cancer Father 60     Pancreatic Cancer Father 60     Prostate Cancer Father      Colorectal Cancer Father      Macular Degeneration Father      CANCER Father      Colorectal Cancer Maternal Grandmother      CANCER Maternal Grandmother      Substance Abuse Maternal Grandmother      Alcohol     Colorectal Cancer Paternal Grandmother       CANCER Mother      DIABETES Mother       3/2016     CEREBROVASCULAR DISEASE Mother      Passed away in Feb of this year, 80 years old.     Thyroid Disease Mother      Depression Mother      Asthma Sister      Had since birth     Thyroid Disease Sister      Depression Sister      Liver Disease No family hx of      Social History     Social History     Marital status:      Spouse name: N/A     Number of children: N/A     Years of education: N/A     Occupational History     Not on file.     Social History Main Topics     Smoking status: Never Smoker     Smokeless tobacco: Former User     Types: Chew     Quit date: 10/31/2017      Comment: 1 tin per week     Alcohol use No      Comment: quit 1996     Drug use: No     Sexual activity: Not Currently     Partners: Female     Birth control/ protection: Condom     Other Topics Concern     Not on file     Social History Narrative           Objective:   /70  Pulse 65  Wt 84.9 kg (187 lb 1.6 oz)  BMI 28.03 kg/m2   Constitutional: no distress.   EYES: anicteric, normal extra-ocular movements, no lid lag or retraction   HEENT: Mouth/Throat: Mucous membrane is moist. Oropharynx is clear. No adenopathy. Normal thyroid   Cardiovascular: RRR, S1, S2 normal. No m/g/r   Pulmonary/Chest: CTAB. No wheezing or rales   Abdominal: +BS. Distended.   Neurological: Alert.  Extremities: No clubbing, cyanosis or inflammation   Skin: normal texture, color  Feet: Tingling +. Sensation is intact.   Lymphatic: no cervical lymphadenopathy.  Psychological: appropriate mood     In House Labs:   Lab Results   Component Value Date    A1C 6.5 2017    A1C 7.8 10/25/2016          TSH   Date Value Ref Range Status   2018 0.71 0.40 - 4.00 mU/L Final   2017 0.42 0.40 - 4.00 mU/L Final       Creatinine   Date Value Ref Range Status   2018 1.19 0.66 - 1.25 mg/dL Final   ]    Recent Labs   Lab Test  10/25/16   1731   CHOL  164   HDL  33*   LDL  90   TRIG  205*

## 2018-04-11 NOTE — LETTER
4/11/2018       RE: Frandy Workman  [address]  [facility] [address]     Dear Colleague,    Thank you for referring your patient, Frandy Workman, to the [facility] ENDOCRINOLOGY at [facility]. Please see a copy of my visit note below.      Assessment/Treatment Plan:      Miller is a 53 year old male here for a follow up     1. Type 2 Diabetes since early 30s with hyperglycemia and neuropathy.     Regimen  Tresiba 100 units daily   Novolog 1 unit per 10 gm carb [at present 15] plus 1 unit per 50 over 100 (start at meals again)   Farxiga 10 mg daily (restart)  Bolus dose were reduced because of hypoglycemia. His sugars are now elevated.     A1c today was 8.1 indicating worsening control.   Benjamin Flash -- he does all the dose calculations from the present meter and he does not want to change it.      Barriers: Stress eating   Lack of testing and bolusing. [ less needles]    Not using prandial aspart  BG testing 3 times a day, with meal time and correction insulin   He will restart doing this    Diabetic neuropathy  Started alpha lipoic acid 600 mg daily.     Hyperlipidemia   Pravastatin 20 mg daily   Check LDL on follow up.     Jennifer Wilcox MD  [id number]  Endocrinology Service        =================================================    Endocrinology Clinic Visit    Chief Complaint: No chief complaint on file.       Subjective:         HPI:  53 / male with history of ESTRADA with liver cirrhosis who is seen in follow up for T2DM - uncontrolled.     He has had diabetes since 2001. He was intially started on Metformin but after diagnosis of liver failure, this was discontinued.     Neuropathy - tingling in feet bilateral - worse since last visit.   Nephropathy - none  Retinopathy - no [ due]  Hypoglycemia - none    Prevention  On Statin.     No change in Bowel habits. On lactulose   Appetite unchanged.   Weight gain  No neck pain or difficulty swallowing.     A1c is not a reliable  indicator of glycemic control: Anemia, does not correlate with BG levels.   BG is high all day long: diet with high carb in it. He avoided snacking and lunches  ?? Poor absorption of Lantus high higher dose. -- This was changed to Tresiba  Lack of oral medication use due to liver cirrhosis. Responded well to Tresiba      Download from her meter shows:  Average 216, sd 94  Recent values mostly high 200s or low 300s in am, improved later in the day.     Insulin regimen  Tresiba 100 units daily  Aspart 1 units per 10 gm CHO not doing regularly  Correction unclear what he is following. [? 50 mg/dl over 100]              Allergies   Allergen Reactions     Codeine Other (See Comments)     Cannot take due to liver  Cannot tolerate oral narcotics       Current Outpatient Prescriptions   Medication Sig Dispense Refill     NOVOLOG FLEXPEN 100 UNIT/ML soln INJECT 1 UNIT PER 4 GRAMS OF CARBS AT MEALS AND SNACKS CORRECTION SCALE OF 1 UNITS PER 25 OVER [address]. AVE DOSE 75UNITS PER DAY 30 mL 4     capsaicin (ZOSTRIX) 0.025 % CREA cream To feet 2-3 times daily as needed. 60 g 6     OLANZapine (ZYPREXA) 2.5 MG tablet Take 1 tablet (2.5 mg) by mouth At Bedtime 90 tablet 1     insulin pen needle (BD VIKTORIA U/F) 32G X 4 MM Use 5 per day 150 each 11     tamsulosin (FLOMAX) 0.4 MG capsule   2     omeprazole (PRILOSEC) 40 MG capsule   2     erythromycin (ROMYCIN) ophthalmic ointment Place 1 Application into the right eye 3 times daily 3.5 g 1     Artificial Tear Ointment (ARTIFICIAL TEARS) OINT Apply 1 Application to eye At Bedtime 3.5 g 11     Propylene Glycol-Glycerin (ARTIFICIAL TEARS) 1-0.3 % SOLN Apply 4 drops to eye every 2 hours (while awake) 30 mL 11     hypromellose-dextran (ARTIFICAL TEARS) SOLN ophthalmic solution Place 1 drop into the right eye every hour as needed for dry eyes Apply at least 4 times daily and as needed for dry eye 1 Bottle 3     dapagliflozin (FARXIGA) 10 MG TABS tablet Take 1 tablet (10 mg) by mouth daily  (Patient taking differently: Take 10 mg by mouth every morning ) 90 tablet 3     pravastatin (PRAVACHOL) 10 MG tablet Take 2 tablets (20 mg) by mouth daily (Patient taking differently: Take 10 mg by mouth every morning ) 90 tablet 3     blood glucose monitoring (ACCU-CHEK EDINSON PLUS) test strip Use to test blood sugar 4 times daily 400 each 3     blood glucose monitoring (ACCU-CHEK FASTCLIX) lancets Use to test blood sugar 4 times daily or as directed.  1 box = 102 lancets 408 each 3     rifaximin (XIFAXAN) 550 MG TABS tablet Take 1 tablet (550 mg) by mouth 2 times daily 60 tablet 11     lactulose (CHRONULAC) 10 GM/15ML solution Take 45 mLs (30 g) by mouth 4 times daily (Patient taking differently: Take 60 g by mouth 4 times daily 2-6 TIMES A DAY) 7200 mL 11     insulin degludec (TRESIBA) 200 UNIT/ML pen Take 120 units daily. (Patient taking differently: Inject 110 Units Subcutaneous every morning Take 110 units dailY AS OF 10/19/17) 36 mL 3     spironolactone (ALDACTONE) 25 MG tablet Take 1 tablet (25 mg) by mouth daily (Patient taking differently: Take 25 mg by mouth every morning ) 90 tablet 1     carvedilol (COREG) 12.5 MG tablet Take 1 tablet (12.5 mg) by mouth 2 times daily (with meals) 180 tablet 3     potassium chloride SA (K-DUR/KLOR-CON M) 20 MEQ CR tablet Take 1 tablet (20 mEq) by mouth daily (Patient taking differently: Take 20 mEq by mouth every morning ) 90 tablet 3     Cholecalciferol (VITAMIN D-3 PO) Take 2,000 Units by mouth every morning        cyanocobalamin (RA VITAMIN B-12 TR) 1000 MCG TBCR Take 1,000 mcg by mouth every morning        Multiple Vitamin (THERAVITE PO) Take 1 tablet by mouth every morning          Review of Systems   No eye symptoms  No headache, change in vision, loss of peripheral vision  No neck pain, no other lumps in the neck  No change in breathing    No change in swallowing.    No swelling in extremities  No change in mental status.    Other ROS that were obtained were  negative.         Past Medical History:   Diagnosis Date     Anemia 2013    Low blood plates current is 37     BPH (benign prostatic hyperplasia)      Cholelithiasis      Conductive hearing loss 8/16/2017    Have a lump on my right side of my face.  Had wax discharge     Depressive disorder 1986    Suffer effects throughout life     Gastroesophageal reflux disease 12/1/2014    Being treated with Prilosac     Hepatitis 2014    Diagnosed with schrosis ESTRADA in 2014.  Suffer from hepatatie     Hyperlipidemia      Liver cirrhosis secondary to ESTRADA (H)      Thrombocytopenia (H)      Type II diabetes mellitus (H)        Past Surgical History:   Procedure Laterality Date     ESOPHAGOSCOPY, GASTROSCOPY, DUODENOSCOPY (EGD), COMBINED N/A 11/17/2016    Procedure: COMBINED ESOPHAGOSCOPY, GASTROSCOPY, DUODENOSCOPY (EGD);  Surgeon: Santi Rosas MD;  Location:  GI     ESOPHAGOSCOPY, GASTROSCOPY, DUODENOSCOPY (EGD), COMBINED N/A 11/17/2017    Procedure: COMBINED ESOPHAGOSCOPY, GASTROSCOPY, DUODENOSCOPY (EGD);  EGD;  Surgeon: Santi Rosas MD;  Location:  GI     HEAD & NECK SURGERY       IMPLANT GOLD WEIGHT EYELID Right 11/16/2017    Procedure: IMPLANT WEIGHT EYELID;  Right Upper Eyelid Weight, right tarsal strip lower eyelid;  Surgeon: Milana Malave MD;  Location: [facility] OR     KNEE SURGERY Left      ORTHOPEDIC SURGERY       PAROTIDECTOMY, RADICAL NECK DISSECTION Right 11/2/2017    Procedure: PAROTIDECTOMY, RADICAL NECK DISSECTION;  Right Superfacial Parotidectomy , Facial nerve repair. with [facility] facial nerve monitor.;  Surgeon: Asiya Morgan MD;  Location: [facility] OR     PICC INSERTION Left 11/06/2017    4fr SL BioFlo PICC, 44cm in the L basilic vein w/ tip in the low SVC     VASCULAR SURGERY         Family History   Problem Relation Age of Onset     Prostate Cancer Maternal Grandfather      Substance Abuse Maternal Grandfather      Alcohol     Colon Cancer Father 60     Pancreatic Cancer Father 60      Prostate Cancer Father      Colorectal Cancer Father      Macular Degeneration Father      CANCER Father      Colorectal Cancer Maternal Grandmother      CANCER Maternal Grandmother      Substance Abuse Maternal Grandmother      Alcohol     Colorectal Cancer Paternal Grandmother      CANCER Mother      DIABETES Mother       3/2016     CEREBROVASCULAR DISEASE Mother      Passed away in Feb of this year, 80 years old.     Thyroid Disease Mother      Depression Mother      Asthma Sister      Had since birth     Thyroid Disease Sister      Depression Sister      Liver Disease No family hx of      Social History     Social History     Marital status:      Spouse name: N/A     Number of children: N/A     Years of education: N/A     Occupational History     Not on file.     Social History Main Topics     Smoking status: Never Smoker     Smokeless tobacco: Former User     Types: Chew     Quit date: 10/31/2017      Comment: 1 tin per week     Alcohol use No      Comment: quit 1996     Drug use: No     Sexual activity: Not Currently     Partners: Female     Birth control/ protection: Condom     Other Topics Concern     Not on file     Social History Narrative           Objective:   /70  Pulse 65  Wt 84.9 kg (187 lb 1.6 oz)  BMI 28.03 kg/m2   Constitutional: no distress.   EYES: anicteric, normal extra-ocular movements, no lid lag or retraction   HEENT: Mouth/Throat: Mucous membrane is moist. Oropharynx is clear. No adenopathy. Normal thyroid   Cardiovascular: RRR, S1, S2 normal. No m/g/r   Pulmonary/Chest: CTAB. No wheezing or rales   Abdominal: +BS. Distended.   Neurological: Alert.  Extremities: No clubbing, cyanosis or inflammation   Skin: normal texture, color  Feet: Tingling +. Sensation is intact.   Lymphatic: no cervical lymphadenopathy.  Psychological: appropriate mood     In House Labs:   Lab Results   Component Value Date    A1C 6.5 2017    A1C 7.8 10/25/2016          TSH   Date  Value Ref Range Status   02/08/2018 0.71 0.40 - 4.00 mU/L Final   06/09/2017 0.42 0.40 - 4.00 mU/L Final       Creatinine   Date Value Ref Range Status   02/08/2018 1.19 0.66 - 1.25 mg/dL Final   ]    Recent Labs   Lab Test  10/25/16   1731   CHOL  164   HDL  33*   LDL  90   TRIG  205*

## 2018-04-16 ENCOUNTER — OFFICE VISIT (OUTPATIENT)
Dept: GASTROENTEROLOGY | Facility: CLINIC | Age: 53
End: 2018-04-16
Attending: INTERNAL MEDICINE
Payer: MEDICARE

## 2018-04-16 VITALS
DIASTOLIC BLOOD PRESSURE: 78 MMHG | HEIGHT: 69 IN | WEIGHT: 184.8 LBS | BODY MASS INDEX: 27.37 KG/M2 | SYSTOLIC BLOOD PRESSURE: 115 MMHG | TEMPERATURE: 97.6 F | OXYGEN SATURATION: 92 % | HEART RATE: 60 BPM

## 2018-04-16 DIAGNOSIS — K74.69 OTHER CIRRHOSIS OF LIVER (H): ICD-10-CM

## 2018-04-16 DIAGNOSIS — K75.81 LIVER CIRRHOSIS SECONDARY TO NASH (H): Primary | ICD-10-CM

## 2018-04-16 DIAGNOSIS — K74.60 LIVER CIRRHOSIS SECONDARY TO NASH (H): Primary | ICD-10-CM

## 2018-04-16 LAB
ALBUMIN SERPL-MCNC: 3.1 G/DL (ref 3.4–5)
ALP SERPL-CCNC: 113 U/L (ref 40–150)
ALT SERPL W P-5'-P-CCNC: 30 U/L (ref 0–70)
ANION GAP SERPL CALCULATED.3IONS-SCNC: 6 MMOL/L (ref 3–14)
AST SERPL W P-5'-P-CCNC: 33 U/L (ref 0–45)
BILIRUB DIRECT SERPL-MCNC: 0.2 MG/DL (ref 0–0.2)
BILIRUB SERPL-MCNC: 0.9 MG/DL (ref 0.2–1.3)
BUN SERPL-MCNC: 28 MG/DL (ref 7–30)
CALCIUM SERPL-MCNC: 9 MG/DL (ref 8.5–10.1)
CHLORIDE SERPL-SCNC: 108 MMOL/L (ref 94–109)
CO2 SERPL-SCNC: 27 MMOL/L (ref 20–32)
CREAT SERPL-MCNC: 1.28 MG/DL (ref 0.66–1.25)
ERYTHROCYTE [DISTWIDTH] IN BLOOD BY AUTOMATED COUNT: 14 % (ref 10–15)
GFR SERPL CREATININE-BSD FRML MDRD: 59 ML/MIN/1.7M2
GLUCOSE SERPL-MCNC: 237 MG/DL (ref 70–99)
HCT VFR BLD AUTO: 35.6 % (ref 40–53)
HGB BLD-MCNC: 12 G/DL (ref 13.3–17.7)
INR PPP: 1.31 (ref 0.86–1.14)
MCH RBC QN AUTO: 34.9 PG (ref 26.5–33)
MCHC RBC AUTO-ENTMCNC: 33.7 G/DL (ref 31.5–36.5)
MCV RBC AUTO: 104 FL (ref 78–100)
PLATELET # BLD AUTO: 37 10E9/L (ref 150–450)
POTASSIUM SERPL-SCNC: 4.6 MMOL/L (ref 3.4–5.3)
PROT SERPL-MCNC: 7 G/DL (ref 6.8–8.8)
RBC # BLD AUTO: 3.44 10E12/L (ref 4.4–5.9)
SODIUM SERPL-SCNC: 140 MMOL/L (ref 133–144)
WBC # BLD AUTO: 3 10E9/L (ref 4–11)

## 2018-04-16 PROCEDURE — 80076 HEPATIC FUNCTION PANEL: CPT | Performed by: INTERNAL MEDICINE

## 2018-04-16 PROCEDURE — 85027 COMPLETE CBC AUTOMATED: CPT | Performed by: INTERNAL MEDICINE

## 2018-04-16 PROCEDURE — G0463 HOSPITAL OUTPT CLINIC VISIT: HCPCS | Mod: ZF

## 2018-04-16 PROCEDURE — 36415 COLL VENOUS BLD VENIPUNCTURE: CPT | Performed by: INTERNAL MEDICINE

## 2018-04-16 PROCEDURE — 80048 BASIC METABOLIC PNL TOTAL CA: CPT | Performed by: INTERNAL MEDICINE

## 2018-04-16 PROCEDURE — 85610 PROTHROMBIN TIME: CPT | Performed by: INTERNAL MEDICINE

## 2018-04-16 ASSESSMENT — PAIN SCALES - GENERAL: PAINLEVEL: MODERATE PAIN (5)

## 2018-04-16 NOTE — MR AVS SNAPSHOT
After Visit Summary   4/16/2018    Frandy Workman    MRN: [id number]           Patient Information     Date Of Birth          1965        Visit Information        Provider Department      4/16/2018 1:00 PM Santi Rosas MD [facility] Hepatology        Today's Diagnoses     Liver cirrhosis secondary to ESTRADA (H)    -  1      Care Instructions    Your kidney tests are a little elevated today.    We will stop the spironolactone today to see if this is making you dehydrated and affecting your kidney tests.    Plan  1.  Stop spironolactone  2.  Keep well-hydrated  3.  Repeat blood tests next week  4.  Liver ultrasound in June  5.  See me in the office in 6 months    Santi Banuelos MD  Hepatology  [facility]          Follow-ups after your visit        Follow-up notes from your care team     Return in about 6 months (around 10/16/2018).      Your next 10 appointments already scheduled     Apr 25, 2018 12:15 PM CDT   LAB with  LAB   [facility] Lab ([facility])    [address] [place] [id number] [phone]           Please do not eat 10-12 hours before your appointment if you are coming in fasting for labs on lipids, cholesterol, or glucose (sugar). This does not apply to pregnant women. Water, hot tea and black coffee (with nothing added) are okay. Do not drink other fluids, diet soda or chew gum.            May 02, 2018 11:45 AM CDT   (Arrive by 11:30 AM)   Fac Paral Treatment with SALBADOR Mcginnis   [facility] Rehab ([facility])    [address] [place] [id number] [phone]            May 16, 2018  2:00 PM CDT   (Arrive by 1:45 PM)   Return Visit with Milana Malave MD   [facility] Ear Nose and Throat ([facility])    [address] [place] [id number] [phone]            Jun 20, 2018 12:00 PM CDT   US ABDOMEN  COMPLETE with UCUS3   [facility] US ([facility])    [address] [place] [id number] [phone]           Please bring a list of your medicines (including vitamins, minerals and over-the-counter drugs). Also, tell your doctor about any allergies you may have. Wear comfortable clothes and leave your valuables at home.  Adults: No eating or drinking for 8 hours before the exam. You may take medicine with a small sip of water.  Children: - Children 6+ years: No food or drink for 6 hours before exam. - Children 1-5 years: No food or drink for 4 hours before exam. - Infants, breast-fed: may have breast milk up to 2 hours before exam. - Infants, formula: may have bottle until 4 hours before exam.  Please call the Imaging Department at your exam site with any questions.            Aug 08, 2018 12:45 PM CDT   LAB with [facility] Lab ([facility])    [address] [place] [id number] [phone]           Please do not eat 10-12 hours before your appointment if you are coming in fasting for labs on lipids, cholesterol, or glucose (sugar). This does not apply to pregnant women. Water, hot tea and black coffee (with nothing added) are okay. Do not drink other fluids, diet soda or chew gum.            Aug 08, 2018  1:30 PM CDT   (Arrive by 1:15 PM)   RETURN ENDOCRINE with Jennifer Wilcox MD   [facility] Endocrinology ([facility])    [address] [place] [id number] [phone]            Sep 13, 2018 12:30 PM CDT   (Arrive by 12:15 PM)   Return Visit with Lamin Gonzalez DPM   [facility] Endocrinology ([facility])    [address] [place] [id number] [phone]            Oct 15, 2018  1:00 PM CDT   Lab with  LAB   [facility] Lab ([facility])    [address] [place] [address]  Se  1st Floor  [facility] [phone] [phone]            Oct 15, 2018  2:00 PM CDT   (Arrive by 1:45 PM)   Return General Liver with Santi Dotson MD   [facility] Hepatology ([facility])    [address]  [address]  [facility] [id number] [phone]              Future tests that were ordered for you today     Open Future Orders        Priority Expected Expires Ordered    Basic metabolic panel Routine  5/16/2018 4/16/2018    US Abdomen Complete Routine 6/16/2018 4/16/2019 4/16/2018            Who to contact     If you have questions or need follow up information about today's clinic visit or your schedule please contact [facility] HEPATOLOGY directly at [phone].  Normal or non-critical lab and imaging results will be communicated to you by [employer], letter or phone within 4 business days after the clinic has received the results. If you do not hear from us within 7 days, please contact the clinic through [employer] or phone. If you have a critical or abnormal lab result, we will notify you by phone as soon as possible.  Submit refill requests through [employer] or call your pharmacy and they will forward the refill request to us. Please allow 3 business days for your refill to be completed.          Additional Information About Your Visit        [employer] Information     [employer] gives you secure access to your electronic health record. If you see a primary care provider, you can also send messages to your care team and make appointments. If you have questions, please call your primary care clinic.  If you do not have a primary care provider, please call [phone] and they will assist you.        Care EveryWhere ID     This is your Care EveryWhere ID. This could be used by other organizations to access your [facility] medical records  [id number]        Your Vitals Were     Pulse Temperature Height Pulse Oximetry BMI (Body Mass Index)       60 97.6  F (36.4  C)  "(Oral) 1.753 m (5' 9\") 92% 27.29 kg/m2        Blood Pressure from Last 3 Encounters:   04/16/18 115/78   04/11/18 104/70   02/08/18 105/67    Weight from Last 3 Encounters:   04/16/18 83.8 kg (184 lb 12.8 oz)   04/11/18 84.9 kg (187 lb 1.6 oz)   02/08/18 82.1 kg (181 lb)                 Today's Medication Changes          These changes are accurate as of 4/16/18  1:21 PM.  If you have any questions, ask your nurse or doctor.               These medicines have changed or have updated prescriptions.        Dose/Directions    dapagliflozin 10 MG Tabs tablet   Commonly known as:  FARXIGA   This may have changed:  when to take this   Used for:  Type 2 diabetes mellitus with hyperglycemia, with long-term current use of insulin (H)        Dose:  10 mg   Take 1 tablet (10 mg) by mouth daily   Quantity:  90 tablet   Refills:  3       insulin degludec 200 UNIT/ML pen   Commonly known as:  TRESIBA   This may have changed:    - how much to take  - how to take this  - when to take this  - additional instructions   Used for:  Type 2 diabetes mellitus with hyperglycemia, with long-term current use of insulin (H)        Take 120 units daily.   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 (H)        Dose:  30 g   Take 45 mLs (30 g) by mouth 4 times daily   Quantity:  7200 mL   Refills:  11       potassium chloride SA 20 MEQ CR tablet   Commonly known as:  K-DUR/KLOR-CON M   This may have changed:  when to take this   Used for:  Hypokalemia        Dose:  20 mEq   Take 1 tablet (20 mEq) by mouth daily   Quantity:  90 tablet   Refills:  3       pravastatin 10 MG tablet   Commonly known as:  PRAVACHOL   This may have changed:    - how much to take  - when to take this   Used for:  Hyperlipidemia LDL goal <100        Dose:  20 mg   Take 2 tablets (20 mg) by mouth " daily   Quantity:  90 tablet   Refills:  3         Stop taking these medicines if you haven't already. Please contact your care team if you have questions.     spironolactone 25 MG tablet   Commonly known as:  ALDACTONE   Stopped by:  Santi Rosas MD                    Primary Care Provider Office Phone # Fax #    Natalie Cartagena Andrés Russell [phone]       [address] [place] [address]  [facility] [address]        Equal Access to Services     [facility] : Hadii aad ku hadasho Soomaali, waaxda luqadaha, qaybta kaalmada adeegyada, waxay idiin hayaan adeeg kharash la'aan . So [facility] [phone].    ATENCIÓN: Si habla español, tiene a hanley disposición servicios gratuitos de asistencia lingüística. [facility] [phone].    We comply with applicable federal civil rights laws and [place] laws. We do not discriminate on the basis of race, color, national origin, age, disability, sex, sexual orientation, or gender identity.            Thank you!     Thank you for choosing [facility] HEPATOLOGY  for your care. Our goal is always to provide you with excellent care. Hearing back from our patients is one way we can continue to improve our services. Please take a few minutes to complete the written survey that you may receive in the mail after your visit with us. Thank you!             Your Updated Medication List - Protect others around you: Learn how to safely use, store and throw away your medicines at www.disposemymeds.org.          This list is accurate as of 4/16/18  1:21 PM.  Always use your most recent med list.                   Brand Name Dispense Instructions for use Diagnosis    ARTIFICIAL TEARS Oint     3.5 g    Apply 1 Application to eye At Bedtime    Post-operative state       BD VIKTORIA U/F 32G X 4 MM   Generic drug:  insulin pen needle     300 each    Use 5 per day    Type 2 diabetes mellitus without complication, with long-term current use of insulin (H)       blood glucose monitoring  lancets     408 each    Use to test blood sugar 4 times daily or as directed.  1 box = 102 lancets    Type II diabetes mellitus (H)       blood glucose monitoring test strip    ACCU-CHEK EDINSON PLUS    400 each    Use to test blood sugar 4 times daily    Type II diabetes mellitus (H)       capsaicin 0.025 % Crea cream    ZOSTRIX    60 g    To feet 2-3 times daily as needed.    Type II or unspecified type diabetes mellitus with neurological manifestations, not stated as uncontrolled(250.60) (H)       carvedilol 12.5 MG tablet    COREG    180 tablet    Take 1 tablet (12.5 mg) by mouth 2 times daily (with meals)    Liver cirrhosis secondary to ESTRADA (H), Secondary esophageal varices without bleeding (H)       dapagliflozin 10 MG Tabs tablet    FARXIGA    90 tablet    Take 1 tablet (10 mg) by mouth daily    Type 2 diabetes mellitus with hyperglycemia, with long-term current use of insulin (H)       erythromycin ophthalmic ointment    ROMYCIN    3.5 g    Place 1 Application into the right eye 3 times daily    Post-operative state       hypromellose-dextran Soln ophthalmic solution     1 Bottle    Place 1 drop into the right eye every hour as needed for dry eyes Apply at least 4 times daily and as needed for dry eye    Dry eyes       insulin degludec 200 UNIT/ML pen    TRESIBA    36 mL    Take 120 units daily.    Type 2 diabetes mellitus with hyperglycemia, with long-term current use of insulin (H)       lactulose 10 GM/15ML solution    CHRONULAC    7200 mL    Take 45 mLs (30 g) by mouth 4 times daily    Liver cirrhosis secondary to ESTRADA (H)       NovoLOG FLEXPEN 100 UNIT/ML injection   Generic drug:  insulin aspart     30 mL    INJECT 1 UNIT PER 4 GRAMS OF CARBS AT MEALS AND SNACKS CORRECTION SCALE OF 1 UNITS PER 25 OVER [address] DOSE 75UNITS PER DAY    Type II diabetes mellitus (H)       OLANZapine 2.5 MG tablet    zyPREXA    90 tablet    Take 1 tablet (2.5 mg) by mouth At Bedtime    Insomnia, unspecified type        omeprazole 40 MG capsule    priLOSEC          potassium chloride SA 20 MEQ CR tablet    K-DUR/KLOR-CON M    90 tablet    Take 1 tablet (20 mEq) by mouth daily    Hypokalemia       pravastatin 10 MG tablet    PRAVACHOL    90 tablet    Take 2 tablets (20 mg) by mouth daily    Hyperlipidemia LDL goal <100       Propylene Glycol-Glycerin 1-0.3 % Soln    ARTIFICIAL TEARS    30 mL    Apply 4 drops to eye every 2 hours (while awake)    Post-operative state       RA VITAMIN B-12 TR 1000 MCG Tbcr   Generic drug:  cyanocobalamin      Take 1,000 mcg by mouth every morning        rifaximin 550 MG Tabs tablet    XIFAXAN    60 tablet    Take 1 tablet (550 mg) by mouth 2 times daily    Hepatic encephalopathy (H)       tamsulosin 0.4 MG capsule    FLOMAX          THERAVITE PO      Take 1 tablet by mouth every morning        VITAMIN D-3 PO      Take 2,000 Units by mouth every morning

## 2018-04-16 NOTE — NURSING NOTE
Chief Complaint   Patient presents with     RECHECK     Cirrhosis of Liver   Pt roomed, vitals, meds, and allergies reviewed with pt. Pt ready for provider.  Grant Cristina, CMA

## 2018-04-16 NOTE — LETTER
"4/16/2018     RE: Frandy Workman  [address]  [facility] [address]     Dear Colleague,    Thank you for referring your patient, Frandy Workman, to the [facility] HEPATOLOGY at [facility]. Please see a copy of my visit note below.    [facility]    Hepatology follow-up    Follow-up visit for cirrhosis    Subjective:  53 year old male    Cirrhosis  - dx 2013  - ESTRADA, A1-AT phenotype- PiMZ  - hx HE  - hx ascites  - no hx variceal bleed  - last EGD Nov 2017- small EV, portal hypertensive gastropathy  - HCC screening- Abd U/S Dec 2017    Patient comes to clinic this afternoon for follow-up of cirrhosis.  Last clinic visit was Oct 2017.  EGD in November 2017 showed small esophageal varices.  He successfully underwent surgery for a right parotid mass in November 2017 with pathology consistent with \"focal chronic inflammation\" and no evidence of malignancy.  Aspart insulin was recently added to the patient's insulin regimen.  Patient denies any other new medications, ER visits or hospital admissions since last clinic visit.    Patient is well today.  He has some ongoing, stable right upper quadrant abdominal pain and some residual right facial pain.  He continues to see his dentist who is removing teeth.  Patient denies any signs or symptoms specific to liver disease.    Patient denies jaundice, lower extremity edema, abdominal distension, lethargy or confusion.    Patient denies melena, hematemesis or hematochezia.    Patient denies fevers, sweats or chills.      Weight is stable.    Patient does not drink alcohol.  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  He moved to [place] in January.  The custody pathak for his daughter has finished and he has not seen her since October.      Medical hx Surgical hx   Past Medical History:   Diagnosis Date     Anemia 2013     BPH (benign prostatic hyperplasia)      Cholelithiasis      Conductive hearing loss " 8/16/2017     Depressive disorder 1986     Gastroesophageal reflux disease 12/1/2014     Hepatitis 2014     Hyperlipidemia      Liver cirrhosis secondary to ESTRADA (H)      Thrombocytopenia (H)      Type II diabetes mellitus (H)       Past Surgical History:   Procedure Laterality Date     ESOPHAGOSCOPY, GASTROSCOPY, DUODENOSCOPY (EGD), COMBINED N/A 11/17/2016    Procedure: COMBINED ESOPHAGOSCOPY, GASTROSCOPY, DUODENOSCOPY (EGD);  Surgeon: Santi Rosas MD;  [facility]: [facility]     ESOPHAGOSCOPY, GASTROSCOPY, DUODENOSCOPY (EGD), COMBINED N/A 11/17/2017    Procedure: COMBINED ESOPHAGOSCOPY, GASTROSCOPY, DUODENOSCOPY (EGD);  EGD;  Surgeon: Santi Rosas MD;  Location: [facility]     HEAD & NECK SURGERY       IMPLANT GOLD WEIGHT EYELID Right 11/16/2017    Procedure: IMPLANT WEIGHT EYELID;  Right Upper Eyelid Weight, right tarsal strip lower eyelid;  Surgeon: Milana Malave MD;  [facility]     KNEE SURGERY Left      ORTHOPEDIC SURGERY       PAROTIDECTOMY, RADICAL NECK DISSECTION Right 11/2/2017    Procedure: PAROTIDECTOMY, RADICAL NECK DISSECTION;  Right Superfacial Parotidectomy , Facial nerve repair. with [facility] facial nerve monitor.;  Surgeon: Asiya Morgan MD;  Location: [facility] OR     PICC INSERTION Left 11/06/2017    4fr SL BioFlo PICC, 44cm in the L basilic vein w/ tip in the low SVC     VASCULAR SURGERY            Medications  Prior to Admission medications    Medication Sig Start Date End Date Taking? Authorizing Provider   BD VIKTORIA U/F 32G X 4 MM insulin pen needle Use 5 per day 4/11/18  Yes Jennifer Wilcox MD   NOVOLOG FLEXPEN 100 UNIT/ML soln INJECT 1 UNIT PER 4 GRAMS OF CARBS AT MEALS AND SNACKS CORRECTION SCALE OF 1 UNITS PER 25 OVER [address] DOSE 75UNITS PER DAY 3/26/18  Yes Natalie Chun MD   capsaicin (ZOSTRIX) 0.025 % CREA cream To feet 2-3 times daily as needed. 3/8/18  Yes Lamin Gonzalez DPM   OLANZapine (ZYPREXA) 2.5 MG tablet Take 1 tablet (2.5 mg) by  mouth At Bedtime 2/13/18  Yes Natalie Chun MD   tamsulosin (FLOMAX) 0.4 MG capsule  12/17/17  Yes Reported, Patient   omeprazole (PRILOSEC) 40 MG capsule  9/20/17  Yes Reported, Patient   erythromycin (ROMYCIN) ophthalmic ointment Place 1 Application into the right eye 3 times daily 11/16/17  Yes Donald Bernal MD   Artificial Tear Ointment (ARTIFICIAL TEARS) OINT Apply 1 Application to eye At Bedtime 11/16/17  Yes Donald Bernal MD   Propylene Glycol-Glycerin (ARTIFICIAL TEARS) 1-0.3 % SOLN Apply 4 drops to eye every 2 hours (while awake) 11/16/17  Yes Donald Bernal MD   hypromellose-dextran (ARTIFICAL TEARS) SOLN ophthalmic solution Place 1 drop into the right eye every hour as needed for dry eyes Apply at least 4 times daily and as needed for dry eye 11/6/17  Yes Kera Lorenzo PA-C   dapagliflozin (FARXIGA) 10 MG TABS tablet Take 1 tablet (10 mg) by mouth daily  Patient taking differently: Take 10 mg by mouth every morning  10/16/17  Yes Jennifer Wilcox MD   pravastatin (PRAVACHOL) 10 MG tablet Take 2 tablets (20 mg) by mouth daily  Patient taking differently: Take 10 mg by mouth every morning  10/16/17  Yes Jennifer Wilcox MD   blood glucose monitoring (ACCU-CHEK EDINSON PLUS) test strip Use to test blood sugar 4 times daily 10/13/17  Yes Natalie Chun MD   blood glucose monitoring (ACCU-CHEK FASTCLIX) lancets Use to test blood sugar 4 times daily or as directed.  1 box = 102 lancets 10/13/17  Yes Natalie Chun MD   rifaximin (XIFAXAN) 550 MG TABS tablet Take 1 tablet (550 mg) by mouth 2 times daily 10/13/17  Yes Santi Rosas MD   lactulose (CHRONULAC) 10 GM/15ML solution Take 45 mLs (30 g) by mouth 4 times daily  Patient taking differently: Take 60 g by mouth 4 times daily 2-6 TIMES A DAY 10/13/17  Yes Santi Rosas MD   insulin degludec (TRESIBA) 200 UNIT/ML pen Take 120 units daily.  Patient  "taking differently: Inject 110 Units Subcutaneous every morning Take 110 units dailY AS OF 10/19/17 10/12/17  Yes Jennifer Wilcox MD   spironolactone (ALDACTONE) 25 MG tablet Take 1 tablet (25 mg) by mouth daily  Patient taking differently: Take 25 mg by mouth every morning  10/5/17  Yes Natalie Chun MD   carvedilol (COREG) 12.5 MG tablet Take 1 tablet (12.5 mg) by mouth 2 times daily (with meals) 10/4/17  Yes Natalie Chun MD   potassium chloride SA (K-DUR/KLOR-CON M) 20 MEQ CR tablet Take 1 tablet (20 mEq) by mouth daily  Patient taking differently: Take 20 mEq by mouth every morning  6/22/17  Yes Natalie Chun MD   Cholecalciferol (VITAMIN D-3 PO) Take 2,000 Units by mouth every morning    Yes Reported, Patient   cyanocobalamin (RA VITAMIN B-12 TR) 1000 MCG TBCR Take 1,000 mcg by mouth every morning  3/14/16  Yes Reported, Patient   Multiple Vitamin (THERAVITE PO) Take 1 tablet by mouth every morning  3/14/16  Yes Reported, Patient   alpha-lipoic acid 600 MG CAPS 1 cap daily  Patient not taking: Reported on 4/16/2018 4/11/18   Jennifer Wilcox MD       Allergies  Allergies   Allergen Reactions     Codeine Other (See Comments)     Cannot take due to liver  Cannot tolerate oral narcotics       Review of systems  A 10-point review of systems was negative    Examination  /78  Pulse 60  Temp 97.6  F (36.4  C) (Oral)  Ht 1.753 m (5' 9\")  Wt 83.8 kg (184 lb 12.8 oz)  SpO2 92%  BMI 27.29 kg/m2  Body mass index is 27.29 kg/(m^2).    Gen- well, NAD, A+Ox3, normal color  CVS- RRR  RS- CTA  Abd- obese, soft, non-tender, no ascites or organomegaly on palpation or percussion, BS+  Extr- hands normal, no LEILA  Skin- no rash or jaundice  Neuro- mild asterixis/tremor, slight R facial droop  Psych- normal mood    Laboratory  Lab Results   Component Value Date     04/16/2018    POTASSIUM 4.6 04/16/2018    CHLORIDE 108 04/16/2018    CO2 27 " 04/16/2018    BUN 28 04/16/2018    CR 1.28 04/16/2018       Lab Results   Component Value Date    BILITOTAL 0.9 04/16/2018    ALT 30 04/16/2018    AST 33 04/16/2018    ALKPHOS 113 04/16/2018       Lab Results   Component Value Date    ALBUMIN 3.1 04/16/2018    PROTTOTAL 7.0 04/16/2018        Lab Results   Component Value Date    WBC 3.0 04/16/2018    HGB 12.0 04/16/2018     04/16/2018    PLT 37 04/16/2018       Lab Results   Component Value Date    INR 1.31 04/16/2018       Radiology  Nil recent    Assessment  53 year old male who presents for routine follow-up of decompensated ESTRADA cirrhosis complicated with hepatic encephalopathy.  MELD-Na= 12 (slight elevation due to Cr).  No evidence of ascites on physical examination or recent ultrasound therefore will discontinue spironolactone due to renal dysfunction.   Hepatic encephalopathy adequately managed on current medications.  Up to date with HCC screening.  Up to date with surveillance of esophageal varices.    Plan  1.  Discontinue spironolactone  2.  Maintain good hydration  3.  Repeat BMP next week  4.  Continue lactulose and rifaximin  5.  Continue carvedilol  6.  Optimize glycemic control  7.  Follow-up in 6 months    Santi Banuelos MD  Hepatology  [facility]

## 2018-04-16 NOTE — LETTER
"4/16/2018    RE: Frandy Workman  [address]  [facility] [address]     [facility]    Hepatology follow-up    Follow-up visit for cirrhosis    Subjective:  53 year old male    Cirrhosis  - dx 2013  - ESTRADA, A1-AT phenotype- PiMZ  - hx HE  - hx ascites  - no hx variceal bleed  - last EGD Nov 2017- small EV, portal hypertensive gastropathy  - HCC screening- Abd U/S Dec 2017    Patient comes to clinic this afternoon for follow-up of cirrhosis.  Last clinic visit was Oct 2017.  EGD in November 2017 showed small esophageal varices.  He successfully underwent surgery for a right parotid mass in November 2017 with pathology consistent with \"focal chronic inflammation\" and no evidence of malignancy.  Aspart insulin was recently added to the patient's insulin regimen.  Patient denies any other new medications, ER visits or hospital admissions since last clinic visit.    Patient is well today.  He has some ongoing, stable right upper quadrant abdominal pain and some residual right facial pain.  He continues to see his dentist who is removing teeth.  Patient denies any signs or symptoms specific to liver disease.    Patient denies jaundice, lower extremity edema, abdominal distension, lethargy or confusion.    Patient denies melena, hematemesis or hematochezia.    Patient denies fevers, sweats or chills.      Weight is stable.    Patient does not drink alcohol.  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  He moved to [place] in January.  The custody pathak for his daughter has finished and he has not seen her since October.      Medical hx Surgical hx   Past Medical History:   Diagnosis Date     Anemia 2013     BPH (benign prostatic hyperplasia)      Cholelithiasis      Conductive hearing loss 8/16/2017     Depressive disorder 1986     Gastroesophageal reflux disease 12/1/2014     Hepatitis 2014     Hyperlipidemia      Liver cirrhosis secondary to ESTRADA (H)      Thrombocytopenia (H)      Type " II diabetes mellitus (H)       Past Surgical History:   Procedure Laterality Date     ESOPHAGOSCOPY, GASTROSCOPY, DUODENOSCOPY (EGD), COMBINED N/A 11/17/2016    Procedure: COMBINED ESOPHAGOSCOPY, GASTROSCOPY, DUODENOSCOPY (EGD);  Surgeon: Santi Rosas MD;  Location:  GI     ESOPHAGOSCOPY, GASTROSCOPY, DUODENOSCOPY (EGD), COMBINED N/A 11/17/2017    Procedure: COMBINED ESOPHAGOSCOPY, GASTROSCOPY, DUODENOSCOPY (EGD);  EGD;  Surgeon: Santi Rosas MD;  [facility]     HEAD & NECK SURGERY       IMPLANT GOLD WEIGHT EYELID Right 11/16/2017    Procedure: IMPLANT WEIGHT EYELID;  Right Upper Eyelid Weight, right tarsal strip lower eyelid;  Surgeon: Milana Malave MD;  [facility] OR     KNEE SURGERY Left      ORTHOPEDIC SURGERY       PAROTIDECTOMY, RADICAL NECK DISSECTION Right 11/2/2017    Procedure: PAROTIDECTOMY, RADICAL NECK DISSECTION;  Right Superfacial Parotidectomy , Facial nerve repair. with [facility] facial nerve monitor.;  Surgeon: Asiya Morgan MD;  Location: [facility] OR     PICC INSERTION Left 11/06/2017    4fr SL BioFlo PICC, 44cm in the L basilic vein w/ tip in the low SVC     VASCULAR SURGERY            Medications  Prior to Admission medications    Medication Sig Start Date End Date Taking? Authorizing Provider   BD VIKTORIA U/F 32G X 4 MM insulin pen needle Use 5 per day 4/11/18  Yes Jennifer Wilcox MD   NOVOLOG FLEXPEN 100 UNIT/ML soln INJECT 1 UNIT PER 4 GRAMS OF CARBS AT MEALS AND SNACKS CORRECTION SCALE OF 1 UNITS PER 25 OVER [address]. [address]VE DOSE 75UNITS PER DAY 3/26/18  Yes Natalie Chun MD   capsaicin (ZOSTRIX) 0.025 % CREA cream To feet 2-3 times daily as needed. 3/8/18  Yes Lamin Gonzalez DPM   OLANZapine (ZYPREXA) 2.5 MG tablet Take 1 tablet (2.5 mg) by mouth At Bedtime 2/13/18  Yes Natalie Chun MD   tamsulosin (FLOMAX) 0.4 MG capsule  12/17/17  Yes Reported, Patient   omeprazole (PRILOSEC) 40 MG capsule  9/20/17  Yes Reported, Patient    erythromycin (ROMYCIN) ophthalmic ointment Place 1 Application into the right eye 3 times daily 11/16/17  Yes Donald Bernal MD   Artificial Tear Ointment (ARTIFICIAL TEARS) OINT Apply 1 Application to eye At Bedtime 11/16/17  Yes Donald Bernal MD   Propylene Glycol-Glycerin (ARTIFICIAL TEARS) 1-0.3 % SOLN Apply 4 drops to eye every 2 hours (while awake) 11/16/17  Yes Donald Bernal MD   hypromellose-dextran (ARTIFICAL TEARS) SOLN ophthalmic solution Place 1 drop into the right eye every hour as needed for dry eyes Apply at least 4 times daily and as needed for dry eye 11/6/17  Yes Kera Lorenzo PA-C   dapagliflozin (FARXIGA) 10 MG TABS tablet Take 1 tablet (10 mg) by mouth daily  Patient taking differently: Take 10 mg by mouth every morning  10/16/17  Yes Jennifer Wilcox MD   pravastatin (PRAVACHOL) 10 MG tablet Take 2 tablets (20 mg) by mouth daily  Patient taking differently: Take 10 mg by mouth every morning  10/16/17  Yes Jennifer Wilcox MD   blood glucose monitoring (ACCU-CHEK EDINSON PLUS) test strip Use to test blood sugar 4 times daily 10/13/17  Yes Natalie Chun MD   blood glucose monitoring (ACCU-CHEK FASTCLIX) lancets Use to test blood sugar 4 times daily or as directed.  1 box = 102 lancets 10/13/17  Yes Natalie Chun MD   rifaximin (XIFAXAN) 550 MG TABS tablet Take 1 tablet (550 mg) by mouth 2 times daily 10/13/17  Yes Santi Rosas MD   lactulose (CHRONULAC) 10 GM/15ML solution Take 45 mLs (30 g) by mouth 4 times daily  Patient taking differently: Take 60 g by mouth 4 times daily 2-6 TIMES A DAY 10/13/17  Yes Santi Rosas MD   insulin degludec (TRESIBA) 200 UNIT/ML pen Take 120 units daily.  Patient taking differently: Inject 110 Units Subcutaneous every morning Take 110 units dailY AS OF 10/19/17 10/12/17  Yes Jennifer Wilcox MD   spironolactone (ALDACTONE) 25 MG tablet Take 1 tablet  "(25 mg) by mouth daily  Patient taking differently: Take 25 mg by mouth every morning  10/5/17  Yes Natalie Chun MD   carvedilol (COREG) 12.5 MG tablet Take 1 tablet (12.5 mg) by mouth 2 times daily (with meals) 10/4/17  Yes Natalie Chun MD   potassium chloride SA (K-DUR/KLOR-CON M) 20 MEQ CR tablet Take 1 tablet (20 mEq) by mouth daily  Patient taking differently: Take 20 mEq by mouth every morning  6/22/17  Yes Natalie Chun MD   Cholecalciferol (VITAMIN D-3 PO) Take 2,000 Units by mouth every morning    Yes Reported, Patient   cyanocobalamin (RA VITAMIN B-12 TR) 1000 MCG TBCR Take 1,000 mcg by mouth every morning  3/14/16  Yes Reported, Patient   Multiple Vitamin (THERAVITE PO) Take 1 tablet by mouth every morning  3/14/16  Yes Reported, Patient   alpha-lipoic acid 600 MG CAPS 1 cap daily  Patient not taking: Reported on 4/16/2018 4/11/18   Jennifer Wilcox MD       Allergies  Allergies   Allergen Reactions     Codeine Other (See Comments)     Cannot take due to liver  Cannot tolerate oral narcotics       Review of systems  A 10-point review of systems was negative    Examination  /78  Pulse 60  Temp 97.6  F (36.4  C) (Oral)  Ht 1.753 m (5' 9\")  Wt 83.8 kg (184 lb 12.8 oz)  SpO2 92%  BMI 27.29 kg/m2  Body mass index is 27.29 kg/(m^2).    Gen- well, NAD, A+Ox3, normal color  CVS- RRR  RS- CTA  Abd- obese, soft, non-tender, no ascites or organomegaly on palpation or percussion, BS+  Extr- hands normal, no LEILA  Skin- no rash or jaundice  Neuro- mild asterixis/tremor, slight R facial droop  Psych- normal mood    Laboratory  Lab Results   Component Value Date     04/16/2018    POTASSIUM 4.6 04/16/2018    CHLORIDE 108 04/16/2018    CO2 27 04/16/2018    BUN 28 04/16/2018    CR 1.28 04/16/2018       Lab Results   Component Value Date    BILITOTAL 0.9 04/16/2018    ALT 30 04/16/2018    AST 33 04/16/2018    ALKPHOS 113 04/16/2018       Lab " Results   Component Value Date    ALBUMIN 3.1 04/16/2018    PROTTOTAL 7.0 04/16/2018        Lab Results   Component Value Date    WBC 3.0 04/16/2018    HGB 12.0 04/16/2018     04/16/2018    PLT 37 04/16/2018       Lab Results   Component Value Date    INR 1.31 04/16/2018       Radiology  Nil recent    Assessment  53 year old male who presents for routine follow-up of decompensated ESTRADA cirrhosis complicated with hepatic encephalopathy.  MELD-Na= 12 (slight elevation due to Cr).  No evidence of ascites on physical examination or recent ultrasound therefore will discontinue spironolactone due to renal dysfunction.   Hepatic encephalopathy adequately managed on current medications.  Up to date with HCC screening.  Up to date with surveillance of esophageal varices.    Plan  1.  Discontinue spironolactone  2.  Maintain good hydration  3.  Repeat BMP next week  4.  Continue lactulose and rifaximin  5.  Continue carvedilol  6.  Optimize glycemic control  7.  Follow-up in 6 months    Santi Banuelos MD  Hepatology  [facility]

## 2018-04-16 NOTE — PROGRESS NOTES
"[facility]    Hepatology follow-up    Follow-up visit for cirrhosis    Subjective:  53 year old male    Cirrhosis  - dx 2013  - ESTRADA, A1-AT phenotype- PiMZ  - hx HE  - hx ascites  - no hx variceal bleed  - last EGD Nov 2017- small EV, portal hypertensive gastropathy  - HCC screening- Abd U/S Dec 2017    Patient comes to clinic this afternoon for follow-up of cirrhosis.  Last clinic visit was Oct 2017.  EGD in November 2017 showed small esophageal varices.  He successfully underwent surgery for a right parotid mass in November 2017 with pathology consistent with \"focal chronic inflammation\" and no evidence of malignancy.  Aspart insulin was recently added to the patient's insulin regimen.  Patient denies any other new medications, ER visits or hospital admissions since last clinic visit.    Patient is well today.  He has some ongoing, stable right upper quadrant abdominal pain and some residual right facial pain.  He continues to see his dentist who is removing teeth.  Patient denies any signs or symptoms specific to liver disease.    Patient denies jaundice, lower extremity edema, abdominal distension, lethargy or confusion.    Patient denies melena, hematemesis or hematochezia.    Patient denies fevers, sweats or chills.      Weight is stable.    Patient does not drink alcohol.  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  He moved to [place] in January.  The custody pathak for his daughter has finished and he has not seen her since October.      Medical hx Surgical hx   Past Medical History:   Diagnosis Date     Anemia 2013     BPH (benign prostatic hyperplasia)      Cholelithiasis      Conductive hearing loss 8/16/2017     Depressive disorder 1986     Gastroesophageal reflux disease 12/1/2014     Hepatitis 2014     Hyperlipidemia      Liver cirrhosis secondary to ESTRADA (H)      Thrombocytopenia (H)      Type II diabetes mellitus (H)       Past Surgical History:   Procedure " Laterality Date     ESOPHAGOSCOPY, GASTROSCOPY, DUODENOSCOPY (EGD), COMBINED N/A 11/17/2016    Procedure: COMBINED ESOPHAGOSCOPY, GASTROSCOPY, DUODENOSCOPY (EGD);  Surgeon: Santi Rosas MD;  [facility]     ESOPHAGOSCOPY, GASTROSCOPY, DUODENOSCOPY (EGD), COMBINED N/A 11/17/2017    Procedure: COMBINED ESOPHAGOSCOPY, GASTROSCOPY, DUODENOSCOPY (EGD);  EGD;  Surgeon: Santi Rosas MD;  [facility]     HEAD & NECK SURGERY       IMPLANT GOLD WEIGHT EYELID Right 11/16/2017    Procedure: IMPLANT WEIGHT EYELID;  Right Upper Eyelid Weight, right tarsal strip lower eyelid;  Surgeon: Milana Malave MD;  [facility]     KNEE SURGERY Left      ORTHOPEDIC SURGERY       PAROTIDECTOMY, RADICAL NECK DISSECTION Right 11/2/2017    Procedure: PAROTIDECTOMY, RADICAL NECK DISSECTION;  Right Superfacial Parotidectomy , Facial nerve repair. with [facility] facial nerve monitor.;  Surgeon: Asiya Morgan MD;  Location[facility]     PICC INSERTION Left 11/06/2017    4fr SL BioFlo PICC, 44cm in the L basilic vein w/ tip in the low SVC     VASCULAR SURGERY            Medications  Prior to Admission medications    Medication Sig Start Date End Date Taking? Authorizing Provider   BD VIKTORIA U/F 32G X 4 MM insulin pen needle Use 5 per day 4/11/18  Yes Jennifer Wilcox MD   NOVOLOG FLEXPEN 100 UNIT/ML soln INJECT 1 UNIT PER 4 GRAMS OF CARBS AT MEALS AND SNACKS CORRECTION SCALE OF 1 UNITS PER 25 OVER [address] DOSE 75UNITS PER DAY 3/26/18  Yes Natalie Chun MD   capsaicin (ZOSTRIX) 0.025 % CREA cream To feet 2-3 times daily as needed. 3/8/18  Yes Lamin Gonzalez DPM   OLANZapine (ZYPREXA) 2.5 MG tablet Take 1 tablet (2.5 mg) by mouth At Bedtime 2/13/18  Yes Natalie Chun MD   tamsulosin (FLOMAX) 0.4 MG capsule  12/17/17  Yes Reported, Patient   omeprazole (PRILOSEC) 40 MG capsule  9/20/17  Yes Reported, Patient   erythromycin (ROMYCIN) ophthalmic ointment Place 1 Application  into the right eye 3 times daily 11/16/17  Yes Donald Bernal MD   Artificial Tear Ointment (ARTIFICIAL TEARS) OINT Apply 1 Application to eye At Bedtime 11/16/17  Yes Donald Bernal MD   Propylene Glycol-Glycerin (ARTIFICIAL TEARS) 1-0.3 % SOLN Apply 4 drops to eye every 2 hours (while awake) 11/16/17  Yes Donald Bernal MD   hypromellose-dextran (ARTIFICAL TEARS) SOLN ophthalmic solution Place 1 drop into the right eye every hour as needed for dry eyes Apply at least 4 times daily and as needed for dry eye 11/6/17  Yes Kera Lorenzo PA-C   dapagliflozin (FARXIGA) 10 MG TABS tablet Take 1 tablet (10 mg) by mouth daily  Patient taking differently: Take 10 mg by mouth every morning  10/16/17  Yes Jennifer Wilcox MD   pravastatin (PRAVACHOL) 10 MG tablet Take 2 tablets (20 mg) by mouth daily  Patient taking differently: Take 10 mg by mouth every morning  10/16/17  Yes Jennifer Wilcox MD   blood glucose monitoring (ACCU-CHEK EDINSON PLUS) test strip Use to test blood sugar 4 times daily 10/13/17  Yes Natalie Chun MD   blood glucose monitoring (ACCU-CHEK FASTCLIX) lancets Use to test blood sugar 4 times daily or as directed.  1 box = 102 lancets 10/13/17  Yes Natalie Chun MD   rifaximin (XIFAXAN) 550 MG TABS tablet Take 1 tablet (550 mg) by mouth 2 times daily 10/13/17  Yes Santi Rosas MD   lactulose (CHRONULAC) 10 GM/15ML solution Take 45 mLs (30 g) by mouth 4 times daily  Patient taking differently: Take 60 g by mouth 4 times daily 2-6 TIMES A DAY 10/13/17  Yes Santi Rosas MD   insulin degludec (TRESIBA) 200 UNIT/ML pen Take 120 units daily.  Patient taking differently: Inject 110 Units Subcutaneous every morning Take 110 units dailY AS OF 10/19/17 10/12/17  Yes Jennifer Wilcox MD   spironolactone (ALDACTONE) 25 MG tablet Take 1 tablet (25 mg) by mouth daily  Patient taking differently: Take 25 mg by  "mouth every morning  10/5/17  Yes Natalie Chun MD   carvedilol (COREG) 12.5 MG tablet Take 1 tablet (12.5 mg) by mouth 2 times daily (with meals) 10/4/17  Yes Natalie Chun MD   potassium chloride SA (K-DUR/KLOR-CON M) 20 MEQ CR tablet Take 1 tablet (20 mEq) by mouth daily  Patient taking differently: Take 20 mEq by mouth every morning  6/22/17  Yes Natalie Chun MD   Cholecalciferol (VITAMIN D-3 PO) Take 2,000 Units by mouth every morning    Yes Reported, Patient   cyanocobalamin (RA VITAMIN B-12 TR) 1000 MCG TBCR Take 1,000 mcg by mouth every morning  3/14/16  Yes Reported, Patient   Multiple Vitamin (THERAVITE PO) Take 1 tablet by mouth every morning  3/14/16  Yes Reported, Patient   alpha-lipoic acid 600 MG CAPS 1 cap daily  Patient not taking: Reported on 4/16/2018 4/11/18   Jennifer Wilcox MD       Allergies  Allergies   Allergen Reactions     Codeine Other (See Comments)     Cannot take due to liver  Cannot tolerate oral narcotics       Review of systems  A 10-point review of systems was negative    Examination  /78  Pulse 60  Temp 97.6  F (36.4  C) (Oral)  Ht 1.753 m (5' 9\")  Wt 83.8 kg (184 lb 12.8 oz)  SpO2 92%  BMI 27.29 kg/m2  Body mass index is 27.29 kg/(m^2).    Gen- well, NAD, A+Ox3, normal color  CVS- RRR  RS- CTA  Abd- obese, soft, non-tender, no ascites or organomegaly on palpation or percussion, BS+  Extr- hands normal, no LEILA  Skin- no rash or jaundice  Neuro- mild asterixis/tremor, slight R facial droop  Psych- normal mood    Laboratory  Lab Results   Component Value Date     04/16/2018    POTASSIUM 4.6 04/16/2018    CHLORIDE 108 04/16/2018    CO2 27 04/16/2018    BUN 28 04/16/2018    CR 1.28 04/16/2018       Lab Results   Component Value Date    BILITOTAL 0.9 04/16/2018    ALT 30 04/16/2018    AST 33 04/16/2018    ALKPHOS 113 04/16/2018       Lab Results   Component Value Date    ALBUMIN 3.1 04/16/2018    PROTTOTAL " 7.0 04/16/2018        Lab Results   Component Value Date    WBC 3.0 04/16/2018    HGB 12.0 04/16/2018     04/16/2018    PLT 37 04/16/2018       Lab Results   Component Value Date    INR 1.31 04/16/2018       Radiology  Nil recent    Assessment  53 year old male who presents for routine follow-up of decompensated ESTRADA cirrhosis complicated with hepatic encephalopathy.  MELD-Na= 12 (slight elevation due to Cr).  No evidence of ascites on physical examination or recent ultrasound therefore will discontinue spironolactone due to renal dysfunction.   Hepatic encephalopathy adequately managed on current medications.  Up to date with HCC screening.  Up to date with surveillance of esophageal varices.    Plan  1.  Discontinue spironolactone  2.  Maintain good hydration  3.  Repeat BMP next week  4.  Continue lactulose and rifaximin  5.  Continue carvedilol  6.  Optimize glycemic control  7.  Follow-up in 6 months    Santi Banuelos MD  Hepatology  [facility]

## 2018-04-16 NOTE — PATIENT INSTRUCTIONS
Your kidney tests are a little elevated today.    We will stop the spironolactone today to see if this is making you dehydrated and affecting your kidney tests.    Plan  1.  Stop spironolactone  2.  Keep well-hydrated  3.  Repeat blood tests next week  4.  Liver ultrasound in June  5.  See me in the office in 6 months    Santi Banuelos MD  Hepatology  [facility]

## 2018-04-25 DIAGNOSIS — K75.81 LIVER CIRRHOSIS SECONDARY TO NASH (H): ICD-10-CM

## 2018-04-25 DIAGNOSIS — K74.60 LIVER CIRRHOSIS SECONDARY TO NASH (H): ICD-10-CM

## 2018-04-25 DIAGNOSIS — E78.5 HYPERLIPIDEMIA LDL GOAL <100: ICD-10-CM

## 2018-04-25 LAB
ANION GAP SERPL CALCULATED.3IONS-SCNC: 6 MMOL/L (ref 3–14)
BUN SERPL-MCNC: 21 MG/DL (ref 7–30)
CALCIUM SERPL-MCNC: 9 MG/DL (ref 8.5–10.1)
CHLORIDE SERPL-SCNC: 111 MMOL/L (ref 94–109)
CHOLEST SERPL-MCNC: 152 MG/DL
CO2 SERPL-SCNC: 24 MMOL/L (ref 20–32)
CREAT SERPL-MCNC: 1.21 MG/DL (ref 0.66–1.25)
GFR SERPL CREATININE-BSD FRML MDRD: 62 ML/MIN/1.7M2
GLUCOSE SERPL-MCNC: 116 MG/DL (ref 70–99)
HDLC SERPL-MCNC: 32 MG/DL
LDLC SERPL CALC-MCNC: 86 MG/DL
NONHDLC SERPL-MCNC: 119 MG/DL
POTASSIUM SERPL-SCNC: 4.1 MMOL/L (ref 3.4–5.3)
SODIUM SERPL-SCNC: 141 MMOL/L (ref 133–144)
TRIGL SERPL-MCNC: 168 MG/DL

## 2018-05-02 ENCOUNTER — THERAPY VISIT (OUTPATIENT)
Dept: SPEECH THERAPY | Facility: CLINIC | Age: 53
End: 2018-05-02
Payer: MEDICARE

## 2018-05-02 DIAGNOSIS — R47.1 DYSARTHRIA: ICD-10-CM

## 2018-05-02 DIAGNOSIS — R29.810 FACIAL WEAKNESS: Primary | ICD-10-CM

## 2018-05-02 NOTE — MR AVS SNAPSHOT
After Visit Summary   5/2/2018    Frandy Workman    MRN: [id number]           Patient Information     Date Of Birth          1965        Visit Information        Provider Department      5/2/2018 11:45 AM Katie Starkey SLP  Health Rehab        Today's Diagnoses     Facial weakness    -  1    Dysarthria           Follow-ups after your visit        Your next 10 appointments already scheduled     May 16, 2018  2:00 PM CDT   (Arrive by 1:45 PM)   Return Visit with Milana Malave MD   [facility] ([facility])    [address] [place] [address] [id number] [phone]            Jun 20, 2018 12:00 PM CDT   US ABDOMEN COMPLETE with [id number]   [facility] US ([facility])    [address] [place] [address] [id number] [phone]           Please bring a list of your medicines (including vitamins, minerals and over-the-counter drugs). Also, tell your doctor about any allergies you may have. Wear comfortable clothes and leave your valuables at home.  Adults: No eating or drinking for 8 hours before the exam. You may take medicine with a small sip of water.  Children: - Children 6+ years: No food or drink for 6 hours before exam. - Children 1-5 years: No food or drink for 4 hours before exam. - Infants, breast-fed: may have breast milk up to 2 hours before exam. - Infants, formula: may have bottle until 4 hours before exam.  Please call the Imaging Department at your exam site with any questions.            Aug 08, 2018 12:45 PM CDT   LAB with [facility] ([facility])    [address] [place] [address] [id number] [phone]           Please do not eat 10-12 hours before your appointment if you are coming in fasting for labs on lipids, cholesterol, or glucose (sugar). This does not apply to pregnant women. Water, hot tea and black  coffee (with nothing added) are okay. Do not drink other fluids, diet soda or chew gum.            Aug 08, 2018  1:30 PM CDT   (Arrive by 1:15 PM)   RETURN ENDOCRINE with Jennifer Wilcox MD   [facility] End[facility]ology ([facility])    [address] [facility]  [address] [facility] [id number] [phone]            Sep 13, 2018 12:30 PM CDT   (Arrive by 12:15 PM)   Return Visit with Lamin Gonzalez DPM   [facility] ([facility])    [address] [place] [id number] [phone]            Oct 15, 2018  1:00 PM CDT   Lab with  LAB   [facility] ([facility])    [address] [place] [id number] [phone]            Oct 15, 2018  2:00 PM CDT   (Arrive by 1:45 PM)   Return General Liver with Santi Dtoson MD   [facility] Hepat[facility] ([facility])    [address] [place] [address]  [address]  [facility] [id number] [phone]              Who to contact     Please call your clinic at [phone] to:    Ask questions about your health    Make or cancel appointments    Discuss your medicines    Learn about your test results    Speak to your doctor            Additional Information About Your Visit        [email or web address] Information     Fanmode gives you secure access to your electronic health record. If you see a primary care provider, you can also send messages to your care team and make appointments. If you have questions, please call your primary care clinic.  If you do not have a primary care provider, please call [phone] and they will assist you.      Fan[employer] is an electronic gateway that provides easy, online access to your medical records. With [employer]mode, you can request a clinic appointment, read your test results, renew a prescription or communicate with your care team.     To access your existing account, please contact your  [facility] or call [phone] for assistance.        Care EveryWhere ID     This is your Care EveryWhere ID. This could be used by other organizations to access your [place] medical records  [id number]         Blood Pressure from Last 3 Encounters:   04/16/18 115/78   04/11/18 104/70   02/08/18 105/67    Weight from Last 3 Encounters:   04/16/18 83.8 kg (184 lb 12.8 oz)   04/11/18 84.9 kg (187 lb 1.6 oz)   02/08/18 82.1 kg (181 lb)              Today, you had the following     No orders found for display         Today's Medication Changes          These changes are accurate as of 5/2/18 12:28 PM.  If you have any questions, ask your nurse or doctor.               These medicines have changed or have updated prescriptions.        Dose/Directions    dapagliflozin 10 MG Tabs tablet   Commonly known as:  FARXIGA   This may have changed:  when to take this   Used for:  Type 2 diabetes mellitus with hyperglycemia, with long-term current use of insulin (H)        Dose:  10 mg   Take 1 tablet (10 mg) by mouth daily   Quantity:  90 tablet   Refills:  3       insulin degludec 200 UNIT/ML pen   Commonly known as:  TRESIBA   This may have changed:    - how much to take  - how to take this  - when to take this  - additional instructions   Used for:  Type 2 diabetes mellitus with hyperglycemia, with long-term current use of insulin (H)        Take 120 units daily.   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 (H)        Dose:  30 g   Take 45 mLs (30 g) by mouth 4 times daily   Quantity:  7200 mL   Refills:  11       potassium chloride SA 20 MEQ CR tablet   Commonly known as:  K-DUR/KLOR-CON M   This may have changed:  when to take this   Used for:  Hypokalemia        Dose:  20 mEq   Take 1 tablet (20 mEq) by mouth daily    Quantity:  90 tablet   Refills:  3       pravastatin 10 MG tablet   Commonly known as:  PRAVACHOL   This may have changed:    - how much to take  - when to take this   Used for:  Hyperlipidemia LDL goal <100        Dose:  20 mg   Take 2 tablets (20 mg) by mouth daily   Quantity:  90 tablet   Refills:  3                Primary Care Provider Office Phone # Fax #    Natalie Mitzi Russell [phone]       [address] [place] [phone]  [facility] [address]        Equal Access to Services     MINDI RODRIGUEZ : Hadii aad ku hadasho Soomaali, waaxda luqadaha, qaybta kaalmada adeegyada, waxay idiin hayaan osbaldo mcdonald . So [facility] [phone].    ATENCIÓN: Si habla español, tiene a hanley disposición servicios gratuitos de asistencia lingüística. [facility] [phone].    We comply with applicable federal civil rights laws and [place] laws. We do not discriminate on the basis of race, color, national origin, age, disability, sex, sexual orientation, or gender identity.            Thank you!     Thank you for choosing [facility]  for your care. Our goal is always to provide you with excellent care. Hearing back from our patients is one way we can continue to improve our services. Please take a few minutes to complete the written survey that you may receive in the mail after your visit with us. Thank you!             Your Updated Medication List - Protect others around you: Learn how to safely use, store and throw away your medicines at www.disposemymeds.org.          This list is accurate as of 5/2/18 12:28 PM.  Always use your most recent med list.                   Brand Name Dispense Instructions for use Diagnosis    ARTIFICIAL TEARS Oint     3.5 g    Apply 1 Application to eye At Bedtime    Post-operative state       BD VIKTORIA U/F 32G X 4 MM   Generic drug:  insulin pen needle     300 each    Use 5 per day    Type 2 diabetes mellitus without complication, with long-term current use of insulin  (H)       blood glucose monitoring lancets     408 each    Use to test blood sugar 4 times daily or as directed.  1 box = 102 lancets    Type II diabetes mellitus (H)       blood glucose monitoring test strip    ACCU-CHEK EDINSON PLUS    400 each    Use to test blood sugar 4 times daily    Type II diabetes mellitus (H)       capsaicin 0.025 % Crea cream    ZOSTRIX    60 g    To feet 2-3 times daily as needed.    Type II or unspecified type diabetes mellitus with neurological manifestations, not stated as uncontrolled(250.60) (H)       carvedilol 12.5 MG tablet    COREG    180 tablet    Take 1 tablet (12.5 mg) by mouth 2 times daily (with meals)    Liver cirrhosis secondary to ESTRADA (H), Secondary esophageal varices without bleeding (H)       dapagliflozin 10 MG Tabs tablet    FARXIGA    90 tablet    Take 1 tablet (10 mg) by mouth daily    Type 2 diabetes mellitus with hyperglycemia, with long-term current use of insulin (H)       erythromycin ophthalmic ointment    ROMYCIN    3.5 g    Place 1 Application into the right eye 3 times daily    Post-operative state       hypromellose-dextran Soln ophthalmic solution     1 Bottle    Place 1 drop into the right eye every hour as needed for dry eyes Apply at least 4 times daily and as needed for dry eye    Dry eyes       insulin degludec 200 UNIT/ML pen    TRESIBA    36 mL    Take 120 units daily.    Type 2 diabetes mellitus with hyperglycemia, with long-term current use of insulin (H)       lactulose 10 GM/15ML solution    CHRONULAC    7200 mL    Take 45 mLs (30 g) by mouth 4 times daily    Liver cirrhosis secondary to ESTRADA (H)       NovoLOG FLEXPEN 100 UNIT/ML injection   Generic drug:  insulin aspart     30 mL    INJECT 1 UNIT PER 4 GRAMS OF CARBS AT MEALS AND SNACKS CORRECTION SCALE OF 1 UNITS PER 25 OVER [address] DOSE 75UNITS PER DAY    Type II diabetes mellitus (H)       OLANZapine 2.5 MG tablet    zyPREXA    90 tablet    Take 1 tablet (2.5 mg) by mouth At Bedtime     Insomnia, unspecified type       omeprazole 40 MG capsule    priLOSEC          potassium chloride SA 20 MEQ CR tablet    K-DUR/KLOR-CON M    90 tablet    Take 1 tablet (20 mEq) by mouth daily    Hypokalemia       pravastatin 10 MG tablet    PRAVACHOL    90 tablet    Take 2 tablets (20 mg) by mouth daily    Hyperlipidemia LDL goal <100       Propylene Glycol-Glycerin 1-0.3 % Soln    ARTIFICIAL TEARS    30 mL    Apply 4 drops to eye every 2 hours (while awake)    Post-operative state       RA VITAMIN B-12 TR 1000 MCG Tbcr   Generic drug:  cyanocobalamin      Take 1,000 mcg by mouth every morning        rifaximin 550 MG Tabs tablet    XIFAXAN    60 tablet    Take 1 tablet (550 mg) by mouth 2 times daily    Hepatic encephalopathy (H)       tamsulosin 0.4 MG capsule    FLOMAX          THERAVITE PO      Take 1 tablet by mouth every morning        VITAMIN D-3 PO      Take 2,000 Units by mouth every morning

## 2018-05-02 NOTE — PROGRESS NOTES
"   Speech Pathology/Facial Paralysis Discharge Summary - 05/02/18 1100   Signing Clinician's Name / Credentials   Signing clinician's name /credentials JEAN Dixon, SLP   Session Number   Session Number 5   Quick Add   Facial Paralysis Facial Paralysis   Subjective Report   Subjective Report \"It's just kind of holding it's own, I haven't been working as hard on the exercises. I have itching next to my eye (encouraged to discuss with doctor/nurse).\"  (Tearing a little less, \"it's more moderate, I'm still using my drops.\")   Resting Symmetry Location    E[facility] [place] Weight   Nasolabial Fold Normal   Lips Normal   Voluntary Movement: Minimal Effort Eye Closure    Minimal Effort Eye Closure Complete Yes  (Improved)   Minimal Effort Eye Closure Lack in mm    General Severity of Synkinesis none   Voluntary Movement: Forehead   Forehead Elevation  Strength Rating % 80%  (Signficantly improved)   General Severity of Synkinesis none   Voluntary Movement: Open Mouth Smile   Open Mouth Smile Strength Rating% 95%  (Signficantly improved)   General Severity of Synkinesis none   Voluntary Movement: Closed Mouth Smile   Closed Mouth Smile Strength Rating % 95%  (Improved)   General Severity of Synkinesis none   Voluntary Movement: Snarl   Snarl Strength Rating % 97%  (Improved)   General Severity of Synkinesis none   Voluntary Movement: Pucker   Pucker Strength Rating % 97%  (Slightly improved)   General Severity of Synkinesis none   Facial Paralysis Goals   Facial Paralysis Goals 1;2;3   Facial Paralysis Goal 1   Goal Identifier Prevention of Corneal Abrasion   Goal Description Patient will demonstrate complete eye closure on therapist evaluation.    Target Date 03/03/18   Date Met 03/21/18   Facial Paralysis Goal 2   Goal Identifier Facial Function for Nonverbal Communication, Speech and Oral Stage Swallowing   Goal Description Patient will demonstrate a 10 point gain on his Facial Grading Score, per therapist " "judgement, reflecting gains in orofacial resting tone, voluntary movement and minimization of synkinesis if present.  (surpassed goal at gain of 26.1/10 points)   Target Date 03/03/18   Date Met 05/02/18   Facial Paralysis Goal 3   Goal Identifier Speech   Goal Description Patient will report a 25% improvement in ability to be understood over the phone in comparison to status noted on this date of evaluation.  (\"It's better.\")   Target Date 03/03/18  (Self-rating at 65% improved. People tell me \"I mumble.\")   Date Met 05/02/18  (continuing for remaining 35% through independent practice, see below)   Treatment Interventions    Treatment Interventions Treatment Swallow/Oral dysfunction;Treatment Speech/Lang/Voice;Facial Paralysis-Massage/Stretch;Facial Paralysis-Strengthening   Treatment Swallow/Oral dysfunction   Skilled Intervention Other   Patient Response Demonstrated understanding   Treatment Detail Instruction in and practice of massage strategies to reduce orofacial discomfort/synkinesis and increase ROM for oral phase swallowing.  (See below)   Treatment Speech/Lang/Voice   Skilled Intervention Provided written and verbal information on.;Provided feedback on performance of tasks;Facilitated respiratory, laryngeal, oral integration   Patient Response Demonstrated understanding   Treatment Detail Instruction in exercises to use for improving speech clarity with and without phone use, i.e. introduced and practiced more accurate production of lingua-alveolar sounds t,d,n,l in counting, words, sentences (will also practice oral reading at home).    Strengthening   Minutes 20   Skilled Intervention Reassessed status. Completed Facial Grading Score. Instruction in and practice of massage strategies to reduce orofacial discomfort/synkinesis and increase ROM for speech, oral phase swallowing and nonverbal communication.   Patient Response Demonstrated understanding   Progress Excellent gains in resting tone and " voluntary movements.    Forehead Elevation Active Assisted  (Optional - pt no longer concerned about forehead movement)   Eye Closure Active ;Other  (optional now)   Snarl Active Assisted  (discontinued, patient satisfied with smile now)   Smile Open Mouth Active Assisted  (discontinued, patient satisfied with smile now)   Eye Closure, Emphasize Lower Lid Active  (to assist with remaining weakness in lower lid)   Assessments Completed   Facial Grading Score 90.6/100  (Continued excellent gains. Ready for discharge with home program)   Education   Learner Patient   Readiness Eager   Method Booklet/handout;Explanation;Demonstration   Response Verbalizes understanding;Demonstrates understanding   Plan   Home program See Strengthening section above.   Plan for next session Discharge and remain available for further assistance upon request.   Total Session Time   Total Treatment Time (sum of timed and untimed services) 20   AMBULATORY CLINICS ONLY-MEDICAL AND TREATMENT DIAGNOSIS   Medical Diagnosis Facial Weakness; Dysarthria   Swallowing Diagnosis Oral Stage Dysphagia  (Per patient report of swallowing issues noted above.)

## 2018-05-16 ENCOUNTER — OFFICE VISIT (OUTPATIENT)
Dept: OTOLARYNGOLOGY | Facility: CLINIC | Age: 53
End: 2018-05-16
Payer: MEDICARE

## 2018-05-16 VITALS — WEIGHT: 188 LBS | HEIGHT: 69 IN | BODY MASS INDEX: 27.85 KG/M2

## 2018-05-16 DIAGNOSIS — H02.231 PARALYTIC LAGOPHTHALMOS OF RIGHT UPPER EYELID: Primary | ICD-10-CM

## 2018-05-16 ASSESSMENT — PAIN SCALES - GENERAL: PAINLEVEL: MILD PAIN (2)

## 2018-05-16 NOTE — PROGRESS NOTES
Facial Plastic and Reconstructive Surgery    Frandy Workman is doing very well  He has great return of his periorbital function.  He has brow elevation and full eyelid closure  He continues with right facial edema and some numbness.    Plan is to remove his right eyelid weight in clinic under local in the Fall.

## 2018-05-16 NOTE — MR AVS SNAPSHOT
After Visit Summary   5/16/2018    Frandy Workman    MRN: [id number]           Patient Information     Date Of Birth          1965        Visit Information        Provider Department      5/16/2018 2:00 PM Milana Malave MD [facility]        Today's Diagnoses     Paralytic lagophthalmos of right upper eyelid    -  1       Follow-ups after your visit        Your next 10 appointments already scheduled     Jun 20, 2018 12:00 PM CDT   US ABDOMEN COMPLETE with US3   [facility] US ([facility])    [address] [place] [id number] [phone]           Please bring a list of your medicines (including vitamins, minerals and over-the-counter drugs). Also, tell your doctor about any allergies you may have. Wear comfortable clothes and leave your valuables at home.  Adults: No eating or drinking for 8 hours before the exam. You may take medicine with a small sip of water.  Children: - Children 6+ years: No food or drink for 6 hours before exam. - Children 1-5 years: No food or drink for 4 hours before exam. - Infants, breast-fed: may have breast milk up to 2 hours before exam. - Infants, formula: may have bottle until 4 hours before exam.  Please call the Imaging Department at your exam site with any questions.            Aug 08, 2018 12:45 PM CDT   LAB with [facility])    [address] [place] [id number] [phone]           Please do not eat 10-12 hours before your appointment if you are coming in fasting for labs on lipids, cholesterol, or glucose (sugar). This does not apply to pregnant women. Water, hot tea and black coffee (with nothing added) are okay. Do not drink other fluids, diet soda or chew gum.            Aug 08, 2018  1:30 PM CDT   (Arrive by 1:15 PM)   RETURN ENDOCRINE with Jennifer Wilcox MD   [facility]  Endocrinology ([facility])    [address]  3rd Floor  [place] [address] [phone]            Sep 13, 2018 12:30 PM CDT   (Arrive by 12:15 PM)   Return Visit with Lamin Gonzalez DPM   [facility] Endocrinology ([facility])    [address]  [address] [facility] [address] [phone]            Oct 15, 2018  1:00 PM CDT   Lab with [facility]   [facility] Lab ([facility])    [address]  1st Floor  [place] [id number] [phone]            Oct 15, 2018  2:00 PM CDT   (Arrive by 1:45 PM)   Return General Liver with Santi Dotson MD   [facility] Hepatology ([facility])    [address]  [address]  [place] [id number] [phone]              Who to contact     Please call your clinic at [phone] to:    Ask questions about your health    Make or cancel appointments    Discuss your medicines    Learn about your test results    Speak to your doctor            Additional Information About Your Visit        [employer] Information     [employer] gives you secure access to your electronic health record. If you see a primary care provider, you can also send messages to your care team and make appointments. If you have questions, please call your primary care clinic.  If you do not have a primary care provider, please call [phone] and they will assist you.      [employer] is an electronic gateway that provides easy, online access to your medical records. With [employer], you can request a clinic appointment, read your test results, renew a prescription or communicate with your care team.     To access your existing account, please contact your [facility] or call [phone] for assistance.        Care EveryWhere ID     This is your Care EveryWhere ID. This could be used by other organizations to access your [facility]  "records  [id number]        Your Vitals Were     Height BMI (Body Mass Index)                1.753 m (5' 9.02\") 27.75 kg/m2           Blood Pressure from Last 3 Encounters:   04/16/18 115/78   04/11/18 104/70   02/08/18 105/67    Weight from Last 3 Encounters:   05/16/18 85.3 kg (188 lb)   04/16/18 83.8 kg (184 lb 12.8 oz)   04/11/18 84.9 kg (187 lb 1.6 oz)              Today, you had the following     No orders found for display         Today's Medication Changes          These changes are accurate as of 5/16/18  3:25 PM.  If you have any questions, ask your nurse or doctor.               These medicines have changed or have updated prescriptions.        Dose/Directions    dapagliflozin 10 MG Tabs tablet   Commonly known as:  FARXIGA   This may have changed:  when to take this   Used for:  Type 2 diabetes mellitus with hyperglycemia, with long-term current use of insulin (H)        Dose:  10 mg   Take 1 tablet (10 mg) by mouth daily   Quantity:  90 tablet   Refills:  3       insulin degludec 200 UNIT/ML pen   Commonly known as:  TRESIBA   This may have changed:    - how much to take  - how to take this  - when to take this  - additional instructions   Used for:  Type 2 diabetes mellitus with hyperglycemia, with long-term current use of insulin (H)        Take 120 units daily.   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 (H)        Dose:  30 g   Take 45 mLs (30 g) by mouth 4 times daily   Quantity:  7200 mL   Refills:  11       potassium chloride SA 20 MEQ CR tablet   Commonly known as:  K-DUR/KLOR-CON M   This may have changed:  when to take this   Used for:  Hypokalemia        Dose:  20 mEq   Take 1 tablet (20 mEq) by mouth daily   Quantity:  90 tablet   Refills:  3       pravastatin 10 MG tablet   Commonly known as:  PRAVACHOL   This may " have changed:    - how much to take  - when to take this   Used for:  Hyperlipidemia LDL goal <100        Dose:  20 mg   Take 2 tablets (20 mg) by mouth daily   Quantity:  90 tablet   Refills:  3                Primary Care Provider Office Phone # Fax #    Natalie Mitzi Andrés Russell [phone]       [address]  [facility] [address]        Equal Access to Services     MINDI RODRIGUEZ AH: Hadii aad ku hadasho Soomaali, waaxda luqadaha, qaybta kaalmada adeegyada, waxay idiin hayaan adeeg kharash la'aan ah. So [facility] [phone].    ATENCIÓN: Si habla espgeorgina, tiene a hanley disposición servicios gratuitos de asistencia lingüística. [facility] [phone].    We comply with applicable federal civil rights laws and [place] laws. We do not discriminate on the basis of race, color, national origin, age, disability, sex, sexual orientation, or gender identity.            Thank you!     Thank you for choosing [facility] EAR NOSE AND THROAT  for your care. Our goal is always to provide you with excellent care. Hearing back from our patients is one way we can continue to improve our services. Please take a few minutes to complete the written survey that you may receive in the mail after your visit with us. Thank you!             Your Updated Medication List - Protect others around you: Learn how to safely use, store and throw away your medicines at www.disposemymeds.org.          This list is accurate as of 5/16/18  3:25 PM.  Always use your most recent med list.                   Brand Name Dispense Instructions for use Diagnosis    ARTIFICIAL TEARS Oint     3.5 g    Apply 1 Application to eye At Bedtime    Post-operative state       BD VIKTORIA U/F 32G X 4 MM   Generic drug:  insulin pen needle     300 each    Use 5 per day    Type 2 diabetes mellitus without complication, with long-term current use of insulin (H)       blood glucose monitoring lancets     408 each    Use to test blood sugar 4 times daily  or as directed.  1 box = 102 lancets    Type II diabetes mellitus (H)       blood glucose monitoring test strip    ACCU-CHEK EDINSON PLUS    400 each    Use to test blood sugar 4 times daily    Type II diabetes mellitus (H)       capsaicin 0.025 % Crea cream    ZOSTRIX    60 g    To feet 2-3 times daily as needed.    Type II or unspecified type diabetes mellitus with neurological manifestations, not stated as uncontrolled(250.60) (H)       carvedilol 12.5 MG tablet    COREG    180 tablet    Take 1 tablet (12.5 mg) by mouth 2 times daily (with meals)    Liver cirrhosis secondary to ESTRADA (H), Secondary esophageal varices without bleeding (H)       dapagliflozin 10 MG Tabs tablet    FARXIGA    90 tablet    Take 1 tablet (10 mg) by mouth daily    Type 2 diabetes mellitus with hyperglycemia, with long-term current use of insulin (H)       erythromycin ophthalmic ointment    ROMYCIN    3.5 g    Place 1 Application into the right eye 3 times daily    Post-operative state       hypromellose-dextran Soln ophthalmic solution     1 Bottle    Place 1 drop into the right eye every hour as needed for dry eyes Apply at least 4 times daily and as needed for dry eye    Dry eyes       insulin degludec 200 UNIT/ML pen    TRESIBA    36 mL    Take 120 units daily.    Type 2 diabetes mellitus with hyperglycemia, with long-term current use of insulin (H)       lactulose 10 GM/15ML solution    CHRONULAC    7200 mL    Take 45 mLs (30 g) by mouth 4 times daily    Liver cirrhosis secondary to ESTRADA (H)       NovoLOG FLEXPEN 100 UNIT/ML injection   Generic drug:  insulin aspart     30 mL    INJECT 1 UNIT PER 4 GRAMS OF CARBS AT MEALS AND SNACKS CORRECTION SCALE OF 1 UNITS PER 25 [address] DOSE 75UNITS PER DAY    Type II diabetes mellitus (H)       OLANZapine 2.5 MG tablet    zyPREXA    90 tablet    Take 1 tablet (2.5 mg) by mouth At Bedtime    Insomnia, unspecified type       omeprazole 40 MG capsule    priLOSEC          potassium chloride SA  20 MEQ CR tablet    K-DUR/KLOR-CON M    90 tablet    Take 1 tablet (20 mEq) by mouth daily    Hypokalemia       pravastatin 10 MG tablet    PRAVACHOL    90 tablet    Take 2 tablets (20 mg) by mouth daily    Hyperlipidemia LDL goal <100       Propylene Glycol-Glycerin 1-0.3 % Soln    ARTIFICIAL TEARS    30 mL    Apply 4 drops to eye every 2 hours (while awake)    Post-operative state       RA VITAMIN B-12 TR 1000 MCG Tbcr   Generic drug:  cyanocobalamin      Take 1,000 mcg by mouth every morning        rifaximin 550 MG Tabs tablet    XIFAXAN    60 tablet    Take 1 tablet (550 mg) by mouth 2 times daily    Hepatic encephalopathy (H)       tamsulosin 0.4 MG capsule    FLOMAX          THERAVITE PO      Take 1 tablet by mouth every morning        VITAMIN D-3 PO      Take 2,000 Units by mouth every morning

## 2018-05-16 NOTE — NURSING NOTE
Chief Complaint   Patient presents with     RECHECK     3-5 month f/u     Sahra Kumar Medical Assistant

## 2018-05-16 NOTE — NURSING NOTE
Photodocumentation obtained.  Patient will return to clinic in September for weight removal under local in the clinic.    Julia Salcedo RN  5/16/2018 2:31 PM

## 2018-05-16 NOTE — LETTER
5/16/2018       RE: Frandy Workman  [address]  [place] [address]     Dear Colleague,    Thank you for referring your patient, Frandy Workman, to the [facility] EAR NOSE AND THROAT at [facility]. Please see a copy of my visit note below.    Facial Plastic and Reconstructive Surgery    Frandy Workman is doing very well  He has great return of his periorbital function.  He has brow elevation and full eyelid closure  He continues with right facial edema and some numbness.    Plan is to remove his right eyelid weight in clinic under local in the Fall.      Again, thank you for allowing me to participate in the care of your patient.      Sincerely,    Milana Malave MD

## 2018-05-19 DIAGNOSIS — E78.5 HYPERLIPIDEMIA LDL GOAL <100: ICD-10-CM

## 2018-05-21 RX ORDER — PRAVASTATIN SODIUM 20 MG
20 TABLET ORAL DAILY
Qty: 90 TABLET | Refills: 3 | Status: SHIPPED | OUTPATIENT
Start: 2018-05-21 | End: 2019-04-01 | Stop reason: ALTCHOICE

## 2018-05-21 NOTE — TELEPHONE ENCOUNTER
Last Clinic Visit: 4/11/2018  [facility] Endocrinology  *Refill Requested As: Pravastatin 20 mg tablets - Take 1 tablet (20 mg) by mouth daily.   On Medication List As: Pravastatin 10 mg tablets - Take 2 tablets (20 mg) by mouth daily    Same daily dose- refill sent as requested for the 20 mg tabs take 1 daily.

## 2018-06-04 DIAGNOSIS — E11.9 TYPE 2 DIABETES MELLITUS WITHOUT COMPLICATION, WITH LONG-TERM CURRENT USE OF INSULIN (H): ICD-10-CM

## 2018-06-04 DIAGNOSIS — Z79.4 TYPE 2 DIABETES MELLITUS WITHOUT COMPLICATION, WITH LONG-TERM CURRENT USE OF INSULIN (H): ICD-10-CM

## 2018-06-06 NOTE — TELEPHONE ENCOUNTER
aspirin (ASPIRIN LOW DOSE) 81 MG EC tablet    Last Written Prescription Date:  9/29/17  Last Fill Quantity: 90,   # refills: 3  Last Office Visit : 2/8/18  Future Office visit:  None    Routing  Because:  Med end date  11/6/17

## 2018-06-14 ENCOUNTER — TELEPHONE (OUTPATIENT)
Dept: OTOLARYNGOLOGY | Facility: CLINIC | Age: 53
End: 2018-06-14

## 2018-06-14 NOTE — TELEPHONE ENCOUNTER
Called patient to schedule eyelid weight removal under local in the procedure room.    Scheduled this for Sept 19th.  Patient to have a  home in case its needed (back up plan).  Patient also to discontinue blood thinners for 7 days prior to procedure.      Will need to obtain oculoplastics surgical tray in preparation of the procedure.    Julia Salcedo RN  6/14/2018 9:41 AM

## 2018-06-16 DIAGNOSIS — E87.6 HYPOKALEMIA: ICD-10-CM

## 2018-06-16 DIAGNOSIS — K21.9 GASTROESOPHAGEAL REFLUX DISEASE, ESOPHAGITIS PRESENCE NOT SPECIFIED: ICD-10-CM

## 2018-06-16 DIAGNOSIS — N40.1 BENIGN PROSTATIC HYPERPLASIA WITH LOWER URINARY TRACT SYMPTOMS: ICD-10-CM

## 2018-06-19 RX ORDER — POTASSIUM CHLORIDE 1500 MG/1
20 TABLET, EXTENDED RELEASE ORAL DAILY
Qty: 90 TABLET | Refills: 2 | Status: SHIPPED | OUTPATIENT
Start: 2018-06-19 | End: 2019-03-17

## 2018-06-19 NOTE — TELEPHONE ENCOUNTER
omeprazole (PRILOSEC) 40 MG capsule  Last Written Prescription Date:  historical  Last Fill Quantity: historical,   # refills: historical  Last Office Visit :  2/8/18  Future Office visit:  None     tamsulosin (FLOMAX) 0.4 MG capsule     Last Written Prescription Date:  Historical  Last Fill Quantity: historical,   # refills: historical    Routing  Because:  omeprazole (PRILOSEC).tamsulosin (FLOMAX)   historical

## 2018-06-20 ENCOUNTER — RADIANT APPOINTMENT (OUTPATIENT)
Dept: ULTRASOUND IMAGING | Facility: CLINIC | Age: 53
End: 2018-06-20
Attending: INTERNAL MEDICINE
Payer: MEDICARE

## 2018-06-20 DIAGNOSIS — K74.60 LIVER CIRRHOSIS SECONDARY TO NASH (H): ICD-10-CM

## 2018-06-20 DIAGNOSIS — K75.81 LIVER CIRRHOSIS SECONDARY TO NASH (H): ICD-10-CM

## 2018-06-20 RX ORDER — OMEPRAZOLE 40 MG/1
40 CAPSULE, DELAYED RELEASE ORAL DAILY
Qty: 90 CAPSULE | Refills: 2 | Status: SHIPPED | OUTPATIENT
Start: 2018-06-20 | End: 2019-03-17

## 2018-06-20 RX ORDER — TAMSULOSIN HYDROCHLORIDE 0.4 MG/1
0.4 CAPSULE ORAL DAILY
Qty: 90 CAPSULE | Refills: 3 | Status: SHIPPED | OUTPATIENT
Start: 2018-06-20 | End: 2019-06-12

## 2018-06-28 ENCOUNTER — MEDICAL CORRESPONDENCE (OUTPATIENT)
Dept: HEALTH INFORMATION MANAGEMENT | Facility: CLINIC | Age: 53
End: 2018-06-28

## 2018-07-02 DIAGNOSIS — H04.123 DRY EYES: ICD-10-CM

## 2018-07-02 DIAGNOSIS — Z98.890 POST-OPERATIVE STATE: ICD-10-CM

## 2018-07-02 RX ORDER — MINERAL OIL, PETROLATUM 425; 568 MG/G; MG/G
1 OINTMENT OPHTHALMIC AT BEDTIME
Qty: 1 TUBE | Refills: 3 | Status: SHIPPED | OUTPATIENT
Start: 2018-07-02 | End: 2019-03-01

## 2018-07-02 RX ORDER — POLYVINYL ALCOHOL/POVIDONE 0.5%-0.6%
1 DROPS OPHTHALMIC (EYE)
Qty: 1 BOTTLE | Refills: 3 | Status: ON HOLD | OUTPATIENT
Start: 2018-07-02 | End: 2019-12-18

## 2018-07-05 DIAGNOSIS — Z98.890 POST-OPERATIVE STATE: ICD-10-CM

## 2018-07-06 NOTE — TELEPHONE ENCOUNTER
"  Artificial Tear Ointment (REFRESH LACRI-LUBE) OINT:   Fax received from [facility] that reads, \"This medication is backordered. Could you please send over an alternative\"? (Routing comment)                   Artificial Tear Ointment (REFRESH LACRI-LUBE) OINT  Last Written Prescription Date:  7/2/18  Last Fill Quantity: 1 tube,   # refills: 3  Last Office Visit : 5/16/18  Future Office visit:  9/19/18    Routing refill request to provider for review/approval because:  Med back ordered. Requests alternative.        "

## 2018-07-11 ENCOUNTER — OFFICE VISIT (OUTPATIENT)
Dept: OPHTHALMOLOGY | Facility: CLINIC | Age: 53
End: 2018-07-11
Payer: MEDICARE

## 2018-07-11 DIAGNOSIS — E11.3313 MODERATE NONPROLIFERATIVE DIABETIC RETINOPATHY OF BOTH EYES WITH MACULAR EDEMA ASSOCIATED WITH TYPE 2 DIABETES MELLITUS (H): Primary | ICD-10-CM

## 2018-07-11 DIAGNOSIS — H25.13 SENILE NUCLEAR SCLEROSIS, BILATERAL: ICD-10-CM

## 2018-07-11 DIAGNOSIS — H52.13 MYOPIA, BILATERAL: ICD-10-CM

## 2018-07-11 DIAGNOSIS — Z98.890 POST-OPERATIVE STATE: ICD-10-CM

## 2018-07-11 ASSESSMENT — REFRACTION_WEARINGRX
OD_CYLINDER: +0.75
SPECS_TYPE: PAL
OS_SPHERE: -7.25
OD_ADD: +2.00
OD_SPHERE: -7.25
OS_ADD: +2.00
OS_CYLINDER: +0.75
OS_AXIS: 040
OD_AXIS: 132

## 2018-07-11 ASSESSMENT — REFRACTION_MANIFEST
OS_SPHERE: -7.75
OS_ADD: +2.00
OS_CYLINDER: +0.75
OD_SPHERE: -7.75
OS_AXIS: 043
OD_CYLINDER: +0.75
OD_AXIS: 123
OD_ADD: +2.00

## 2018-07-11 ASSESSMENT — VISUAL ACUITY
OS_CC: J2
OS_CC+: -2
CORRECTION_TYPE: GLASSES
OD_CC+: -2
OS_CC: 20/30
METHOD: SNELLEN - LINEAR
OD_CC: J2
OD_CC: 20/25

## 2018-07-11 ASSESSMENT — SLIT LAMP EXAM - LIDS
COMMENTS: NORMAL
COMMENTS: SMALL PAPILLOMA ALONG LL MARGIN

## 2018-07-11 ASSESSMENT — CONF VISUAL FIELD
OS_NORMAL: 1
METHOD: COUNTING FINGERS
OD_NORMAL: 1

## 2018-07-11 ASSESSMENT — CUP TO DISC RATIO
OD_RATIO: 0.25
OS_RATIO: 0.3

## 2018-07-11 ASSESSMENT — TONOMETRY
OS_IOP_MMHG: 10
IOP_METHOD: ICARE
OD_IOP_MMHG: 13

## 2018-07-11 ASSESSMENT — EXTERNAL EXAM - RIGHT EYE: OD_EXAM: NORMAL

## 2018-07-11 ASSESSMENT — EXTERNAL EXAM - LEFT EYE: OS_EXAM: NORMAL

## 2018-07-11 NOTE — MR AVS SNAPSHOT
After Visit Summary   7/11/2018    Frandy Workman    MRN: [id number]           Patient Information     Date Of Birth          1965        Visit Information        Provider Department      7/11/2018 12:00 PM Tom Amezquita OD [facility] Ophthalmology        Today's Diagnoses     Moderate nonproliferative diabetic 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 macular edema associated with type 2 diabetes mellitus (H)    -  1    Myopia, bilateral        Senile nuclear sclerosis, bilateral        Post-operative state           Follow-ups after your visit        Your next 10 appointments already scheduled     Aug 08, 2018 12:45 PM CDT   LAB with  LAB   [facility] Lab ([place] [facility])    [address] [place] [address]   [phone]           Please do not eat 10-12 hours before your appointment if you are coming in fasting for labs on lipids, cholesterol, or glucose (sugar). This does not apply to pregnant women. Water, hot tea and black coffee (with nothing added) are okay. Do not drink other fluids, diet soda or chew gum.            Aug 08, 2018  1:30 PM CDT   (Arrive by 1:15 PM)   RETURN ENDOCRINE with Jennifer Wilcox MD   [facility] Endocrinology ([facility])    [address] [place] [address]   [phone]            Aug 09, 2018 10:00 AM CDT   NEW RETINA with Enriqueta Martin MD   Eye Clinic ([facility])    [address]  [facility] [address]   [phone]            Sep 13, 2018 12:45 PM CDT   (Arrive by 12:30 PM)   Return Visit with Lamin Gonzalez DPM   [facility] Endocrinology ([facility])    [address] [place] [address]   [phone]            Sep 19, 2018 11:20 AM CDT   (Arrive by 11:05 AM)   Procedure with Milana Malave MD,  ENT PROCEDURE ROOM   [facility] Ear  Nose and Throat ([facility])    [address]  4th Floor  [facility] [id number] [phone]            Oct 15, 2018  1:00 PM CDT   Lab with [facility]   [facility] ([facility])    [address]  1st Floor  [facility] [id number] [phone]            Oct 15, 2018  2:00 PM CDT   (Arrive by 1:45 PM)   Return General Liver with Santi Dotson MD   [facility] Hepatology ([facility])    [address]  [address]  [facility] [id number] [phone]              Who to contact     Please call your clinic at [phone] to:    Ask questions about your health    Make or cancel appointments    Discuss your medicines    Learn about your test results    Speak to your doctor            Additional Information About Your Visit        [employer] Information     [employer] gives you secure access to your electronic health record. If you see a primary care provider, you can also send messages to your care team and make appointments. If you have questions, please call your primary care clinic.  If you do not have a primary care provider, please call [phone] and they will assist you.      [employer] is an electronic gateway that provides easy, online access to your medical records. With [employer], you can request a clinic appointment, read your test results, renew a prescription or communicate with your care team.     To access your existing account, please contact your [facility] or call [phone] for assistance.        Care EveryWhere ID     This is your Care EveryWhere ID. This could be used by other organizations to access your [facility] medical records  [id number]         Blood Pressure from Last 3 Encounters:   04/16/18 115/78   04/11/18 104/70   02/08/18 105/67    Weight from Last 3 Encounters:   05/16/18 85.3 kg (188 lb)   04/16/18 83.8 kg (184 lb 12.8 oz)   04/11/18 84.9 kg (187  lb 1.6 oz)              We Performed the Following     OCT Retina Spectralis OU (both eyes)     REFRACTION [68703]          Today's Medication Changes          These changes are accurate as of 7/11/18 12:57 PM.  If you have any questions, ask your nurse or doctor.               These medicines have changed or have updated prescriptions.        Dose/Directions    dapagliflozin 10 MG Tabs tablet   Commonly known as:  FARXIGA   This may have changed:  when to take this   Used for:  Type 2 diabetes mellitus with hyperglycemia, with long-term current use of insulin (H)        Dose:  10 mg   Take 1 tablet (10 mg) by mouth daily   Quantity:  90 tablet   Refills:  3       insulin degludec 200 UNIT/ML pen   Commonly known as:  TRESIBA   This may have changed:    - how much to take  - how to take this  - when to take this  - additional instructions   Used for:  Type 2 diabetes mellitus with hyperglycemia, with long-term current use of insulin (H)        Take 120 units daily.   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 (H)        Dose:  30 g   Take 45 mLs (30 g) by mouth 4 times daily   Quantity:  7200 mL   Refills:  11                Primary Care Provider Office Phone # Fax #    Natalie Mitzi Andrés Russell [phone]       [address] [place] [address] [address] [place] [id number]        Equal Access to Services     MINDI RODRIGUEZ AH: Hadii curly Vanessa, waaxda luelvi, qaybta kaalmada leonard, emy mcdonald . So [facility] [phone].    ATENCIÓN: Si habla español, tiene a hanley disposición servicios gratuitos de asistencia lingüística. Llame al [phone].    We comply with applicable federal civil rights laws and [place] laws. We do not discriminate on the basis of race, color, national origin, age,  disability, sex, sexual orientation, or gender identity.            Thank you!     Thank you for choosing [facility] OPHTHALMOLOGY  for your care. Our goal is always to provide you with excellent care. Hearing back from our patients is one way we can continue to improve our services. Please take a few minutes to complete the written survey that you may receive in the mail after your visit with us. Thank you!             Your Updated Medication List - Protect others around you: Learn how to safely use, store and throw away your medicines at www.disposemymeds.org.          This list is accurate as of 7/11/18 12:57 PM.  Always use your most recent med list.                   Brand Name Dispense Instructions for use Diagnosis    aspirin 81 MG EC tablet    ASPIRIN LOW DOSE    90 tablet    Take 1 tablet (81 mg) by mouth daily    Type 2 diabetes mellitus without complication, with long-term current use of insulin (H)       BD VIKTORIA U/F 32G X 4 MM   Generic drug:  insulin pen needle     300 each    Use 5 per day    Type 2 diabetes mellitus without complication, with long-term current use of insulin (H)       blood glucose monitoring lancets     408 each    Use to test blood sugar 4 times daily or as directed.  1 box = 102 lancets    Type II diabetes mellitus (H)       blood glucose monitoring test strip    ACCU-CHEK EDINSON PLUS    400 each    Use to test blood sugar 4 times daily    Type II diabetes mellitus (H)       capsaicin 0.025 % Crea cream    ZOSTRIX    60 g    To feet 2-3 times daily as needed.    Type II or unspecified type diabetes mellitus with neurological manifestations, not stated as uncontrolled(250.60) (H)       carvedilol 12.5 MG tablet    COREG    180 tablet    Take 1 tablet (12.5 mg) by mouth 2 times daily (with meals)    Liver cirrhosis secondary to ESTRADA (H), Secondary esophageal varices without bleeding (H)       dapagliflozin 10 MG Tabs tablet    FARXIGA    90 tablet    Take 1 tablet (10 mg) by mouth daily     Type 2 diabetes mellitus with hyperglycemia, with long-term current use of insulin (H)       erythromycin ophthalmic ointment    ROMYCIN    3.5 g    Place 1 Application into the right eye 3 times daily    Post-operative state       insulin degludec 200 UNIT/ML pen    TRESIBA    36 mL    Take 120 units daily.    Type 2 diabetes mellitus with hyperglycemia, with long-term current use of insulin (H)       lactulose 10 GM/15ML solution    CHRONULAC    7200 mL    Take 45 mLs (30 g) by mouth 4 times daily    Liver cirrhosis secondary to ESTRADA (H)       NovoLOG FLEXPEN 100 UNIT/ML injection   Generic drug:  insulin aspart     30 mL    INJECT 1 UNIT PER 4 GRAMS OF CARBS AT MEALS AND SNACKS CORRECTION SCALE OF 1 UNITS PER 25 OVER [address]. AVE DOSE 75UNITS PER DAY    Type II diabetes mellitus (H)       OLANZapine 2.5 MG tablet    zyPREXA    90 tablet    Take 1 tablet (2.5 mg) by mouth At Bedtime    Insomnia, unspecified type       * omeprazole 40 MG capsule    priLOSEC          * omeprazole 40 MG capsule    priLOSEC    90 capsule    Take 1 capsule (40 mg) by mouth daily    Gastroesophageal reflux disease, esophagitis presence not specified       potassium chloride SA 20 MEQ CR tablet    K-DUR/KLOR-CON M    90 tablet    Take 1 tablet (20 mEq) by mouth daily    Hypokalemia       pravastatin 20 MG tablet    PRAVACHOL    90 tablet    Take 1 tablet (20 mg) by mouth daily    Hyperlipidemia LDL goal <100       Propylene Glycol-Glycerin 1-0.3 % Soln    ARTIFICIAL TEARS    30 mL    Apply 4 drops to eye every 2 hours (while awake)    Post-operative state       RA VITAMIN B-12 TR 1000 MCG Tbcr   Generic drug:  cyanocobalamin      Take 1,000 mcg by mouth every morning        REFRESH LACRI-LUBE Oint     1 Tube    Apply 1 Application to eye At Bedtime    Post-operative state       rifaximin 550 MG Tabs tablet    XIFAXAN    60 tablet    Take 1 tablet (550 mg) by mouth 2 times daily    Hepatic encephalopathy (H)       SM ARTIFICIAL TEARS Soln      1 Bottle    Place 1 drop into the right eye every hour as needed Apply at least 4 times daily and as needed for dry eye    Dry eyes       * tamsulosin 0.4 MG capsule    FLOMAX          * tamsulosin 0.4 MG capsule    FLOMAX    90 capsule    Take 1 capsule (0.4 mg) by mouth daily    Benign prostatic hyperplasia with lower urinary tract symptoms       THERAVITE PO      Take 1 tablet by mouth every morning        VITAMIN D-3 PO      Take 2,000 Units by mouth every morning        * Notice:  This list has 4 medication(s) that are the same as other medications prescribed for you. Read the directions carefully, and ask your doctor or other care provider to review them with you.

## 2018-07-11 NOTE — NURSING NOTE
Chief Complaints and History of Present Illnesses   Patient presents with     Eye Exam For Diabetes     HPI    Affected eye(s):  Both   Symptoms:     No blurred vision      Frequency:  Constant       Do you have eye pain now?:  No      Comments:  Last A1c 8.1% on 4/11/18. Type II diabetes    Pt states glasses are a year old, had some eye surgery with Dr. Kang,  And feels vision has changed. No pain. AT QD OD.    Binu Vides COT 11:55 AM July 11, 2018

## 2018-07-11 NOTE — PROGRESS NOTES
Assessment/Plan  (E11.3313) Moderate nonproliferative diabetic retinopathy of both eyes with macular edema associated with type 2 diabetes mellitus (H)  (primary encounter diagnosis)  Comment: Moderate NPDR OU with CSME OS  Plan: OCT Retina Spectralis OU (both eyes)         Educated patient on clinical findings and the importance of continued management with primary care physician. Referred to Dr. Martin for retina consultation.    (H52.13) Myopia, bilateral  Comment: Myopia with presbyopia OU  Plan: REFRACTION [63776]         Dispensed spectacle prescription for full time wear. Educated patient on possibility of adaptation period, if symptoms do not improve return to clinic for further testing.    (H25.13) Senile nuclear sclerosis, bilateral  Comment: Not visually significant OU  Plan:  No treatment indicated. Monitor annually.    (Z98.890) Post-operative state  Comment: Healing well  Plan: erythromycin (ROMYCIN) ophthalmic ointment, Propylene Glycol-Glycerin (ARTIFICIAL TEARS) 1-0.3 % SOLN         Continue management as directed with Dr. Malave.      Complete documentation of historical and exam elements from today's encounter can  be found in the full encounter summary report (not reduplicated in this progress  note). I personally obtained the chief complaint(s) and history of present illness. I  confirmed and edited as necessary the review of systems, past medical/surgical  history, family history, social history, and examination findings as documented by  others; and I examined the patient myself. I personally reviewed the relevant tests,  images, and reports as documented above. I formulated and edited as necessary the  assessment and plan and discussed the findings and management plan with the  patient and family.    Tom Amezquita, OD, FAAO

## 2018-07-12 RX ORDER — ERYTHROMYCIN 5 MG/G
OINTMENT OPHTHALMIC 3 TIMES DAILY
Qty: 1 TUBE | Refills: 1 | Status: SHIPPED | OUTPATIENT
Start: 2018-07-12 | End: 2019-03-01

## 2018-07-26 DIAGNOSIS — Z79.4 TYPE 2 DIABETES MELLITUS WITH HYPERGLYCEMIA, WITH LONG-TERM CURRENT USE OF INSULIN (H): ICD-10-CM

## 2018-07-26 DIAGNOSIS — E11.65 TYPE 2 DIABETES MELLITUS WITH HYPERGLYCEMIA, WITH LONG-TERM CURRENT USE OF INSULIN (H): ICD-10-CM

## 2018-07-26 ASSESSMENT — ENCOUNTER SYMPTOMS
TASTE DISTURBANCE: 0
HALLUCINATIONS: 0
MUSCLE CRAMPS: 0
EYE REDNESS: 0
POLYPHAGIA: 0
NECK PAIN: 1
EYE PAIN: 1
SINUS PAIN: 1
POLYDIPSIA: 0
SINUS CONGESTION: 0
CHILLS: 0
PANIC: 0
WEIGHT GAIN: 1
SMELL DISTURBANCE: 0
SKIN CHANGES: 0
JOINT SWELLING: 0
FATIGUE: 1
NECK MASS: 0
DECREASED CONCENTRATION: 0
EYE IRRITATION: 1
DOUBLE VISION: 0
ALTERED TEMPERATURE REGULATION: 1
SORE THROAT: 0
HOARSE VOICE: 1
BACK PAIN: 1
ARTHRALGIAS: 1
POOR WOUND HEALING: 0
DECREASED APPETITE: 1
NIGHT SWEATS: 0
FEVER: 0
NERVOUS/ANXIOUS: 0
INSOMNIA: 1
TROUBLE SWALLOWING: 0
MYALGIAS: 1
MUSCLE WEAKNESS: 1
DEPRESSION: 0
WEIGHT LOSS: 0
INCREASED ENERGY: 1
EYE WATERING: 1
NAIL CHANGES: 0
STIFFNESS: 1

## 2018-07-29 DIAGNOSIS — R18.8 OTHER ASCITES: ICD-10-CM

## 2018-07-31 RX ORDER — SPIRONOLACTONE 25 MG/1
TABLET ORAL
Qty: 90 TABLET | Refills: 0 | OUTPATIENT
Start: 2018-07-31

## 2018-08-06 DIAGNOSIS — E11.3213 TYPE 2 DIABETES MELLITUS WITH MILD NONPROLIFERATIVE RETINOPATHY OF BOTH EYES AND MACULAR EDEMA, UNSPECIFIED LONG TERM INSULIN USE STATUS: Primary | ICD-10-CM

## 2018-08-08 ENCOUNTER — OFFICE VISIT (OUTPATIENT)
Dept: ENDOCRINOLOGY | Facility: CLINIC | Age: 53
End: 2018-08-08
Payer: MEDICARE

## 2018-08-08 VITALS
BODY MASS INDEX: 27.62 KG/M2 | HEIGHT: 69 IN | DIASTOLIC BLOOD PRESSURE: 68 MMHG | WEIGHT: 186.5 LBS | SYSTOLIC BLOOD PRESSURE: 117 MMHG | HEART RATE: 70 BPM

## 2018-08-08 DIAGNOSIS — N52.9 ERECTILE DYSFUNCTION, UNSPECIFIED ERECTILE DYSFUNCTION TYPE: ICD-10-CM

## 2018-08-08 DIAGNOSIS — E11.9 TYPE 2 DIABETES MELLITUS WITHOUT COMPLICATION, WITH LONG-TERM CURRENT USE OF INSULIN (H): ICD-10-CM

## 2018-08-08 DIAGNOSIS — Z79.4 TYPE 2 DIABETES MELLITUS WITHOUT COMPLICATION, WITH LONG-TERM CURRENT USE OF INSULIN (H): ICD-10-CM

## 2018-08-08 DIAGNOSIS — I85.00 ESOPHAGEAL VARICES WITHOUT BLEEDING, UNSPECIFIED ESOPHAGEAL VARICES TYPE (H): Primary | ICD-10-CM

## 2018-08-08 LAB
ALT SERPL W P-5'-P-CCNC: 31 U/L (ref 0–70)
CHOLEST SERPL-MCNC: 133 MG/DL
CK SERPL-CCNC: 67 U/L (ref 30–300)
CREAT UR-MCNC: 107 MG/DL
ERYTHROCYTE [DISTWIDTH] IN BLOOD BY AUTOMATED COUNT: 15.9 % (ref 10–15)
HBA1C MFR BLD: 6.6 % (ref 0–5.6)
HCT VFR BLD AUTO: 30.1 % (ref 40–53)
HCT VFR BLD AUTO: 30.1 % (ref 40–53)
HDLC SERPL-MCNC: 30 MG/DL
HGB BLD-MCNC: 9 G/DL (ref 13.3–17.7)
LDLC SERPL CALC-MCNC: 68 MG/DL
MCH RBC QN AUTO: 28.8 PG (ref 26.5–33)
MCHC RBC AUTO-ENTMCNC: 29.9 G/DL (ref 31.5–36.5)
MCV RBC AUTO: 97 FL (ref 78–100)
MICROALBUMIN UR-MCNC: 7 MG/L
MICROALBUMIN/CREAT UR: 6.48 MG/G CR (ref 0–17)
NONHDLC SERPL-MCNC: 103 MG/DL
PLATELET # BLD AUTO: 50 10E9/L (ref 150–450)
RBC # BLD AUTO: 3.12 10E12/L (ref 4.4–5.9)
T4 FREE SERPL-MCNC: 1.21 NG/DL (ref 0.76–1.46)
TRIGL SERPL-MCNC: 172 MG/DL
TSH SERPL DL<=0.005 MIU/L-ACNC: 0.49 MU/L (ref 0.4–4)
WBC # BLD AUTO: 2.2 10E9/L (ref 4–11)

## 2018-08-08 RX ORDER — TADALAFIL 20 MG/1
20 TABLET ORAL DAILY PRN
Qty: 30 TABLET | Refills: 0 | Status: SHIPPED | OUTPATIENT
Start: 2018-08-08 | End: 2019-03-01

## 2018-08-08 ASSESSMENT — PAIN SCALES - GENERAL: PAINLEVEL: NO PAIN (0)

## 2018-08-08 NOTE — PROGRESS NOTES
Endocrine clinic visit    Interval history  Has been doing well.  Has been checking his blood sugars very regularly.  Overall blood sugars are well controlled.  Fasting blood sugars are dependent on the evening meals but usually between 70 and 100.  There is 2-3 days of a week where blood sugars are over 150 rarely above 200.  The evening blood sugars also remains in the same numbers although there are a few occasions where blood sugars have been over 300.    His main concern today was erectile dysfunction.  He is in a relationship and is eager to try medications for erectile dysfunction.  His libido is good and energy levels are good.  However he has hard time getting an erection.  Once he gets them, he can maintain it well.  He has tried sildenafil in the past at the lower dose without good effect.      Assessment/Treatment Plan:      Miller is a 53 year old male here for a follow up.    1. Type 2 Diabetes since early 30s with hyperglycemia and neuropathy.   Overall his blood sugar control is better since he started using his medications again.  Lab testing is still pending.    Regimen  Tresiba 100 units daily   Novolog 1 unit per 10 gm carb [at present 15] plus 1 unit per 50 over 100 (start at meals again)   Farxiga 10 mg daily   Bolus dose were reduced because of hypoglycemia. His sugars are now elevated.     Benjamin Flash -- he does all the dose calculations from the present meter and he does not want to change it.      Barriers: Stress eating   Lack of testing and bolusing.  He is doing more testing now.  BG testing 3 times a day, with meal time and correction insulin     Diabetic neuropathy  Started alpha lipoic acid 600 mg daily.     Erectile dysfunction  I gave him a prescription for Cialis to be started at 10 mg daily about 4-6 hours apart from using Flomax.  We discussed about side effects including hypotension.    Hyperlipidemia   Pravastatin 20 mg daily   Check LDL on follow up.     Jennifer Wilcox,  MD  6162  Endocrinology Service        =================================================    Endocrinology Clinic Visit    Chief Complaint: RECHECK (DIABETES TYPE 2)       Subjective:         HPI:  53 / male with history of ESTRADA with liver cirrhosis who is seen in follow up for T2DM - uncontrolled.     He has had diabetes since 2001. He was intially started on Metformin but after diagnosis of liver failure, this was discontinued.     Neuropathy - tingling in feet bilateral - worse since last visit.   Nephropathy - none  Retinopathy - no.  He will be seeing a retina specialist for a retinal hemorrhage on the left eye.  Hypoglycemia - none    Prevention  On Statin.     No change in Bowel habits. On lactulose   Appetite unchanged.   Weight gain  No neck pain or difficulty swallowing.     A1c is not a reliable indicator of glycemic control: Anemia, does not correlate with BG levels.   BG is high all day long: diet with high carb in it. He avoided snacking and lunches  ?? Poor absorption of Lantus high higher dose. -- This was changed to Tresiba  Lack of oral medication use due to liver cirrhosis. Responded well to Tresiba       Insulin regimen  Tresiba 100 units daily  Aspart 1 units per 10 gm CHO not doing regularly  Correction unclear what he is following. [? 50 mg/dl over 100]              Allergies   Allergen Reactions     Codeine Other (See Comments)     Cannot take due to liver  Cannot tolerate oral narcotics       Current Outpatient Prescriptions   Medication Sig Dispense Refill     Artificial Tear Ointment (REFRESH LACRI-LUBE) OINT Apply 1 Application to eye At Bedtime 1 Tube 3     Artificial Tear Solution (SM ARTIFICIAL TEARS) SOLN Place 1 drop into the right eye every hour as needed Apply at least 4 times daily and as needed for dry eye 1 Bottle 3     aspirin (ASPIRIN LOW DOSE) 81 MG EC tablet Take 1 tablet (81 mg) by mouth daily 90 tablet 2     BD VIKTORIA U/F 32G X 4 MM insulin pen needle Use 5 per day 300 each 3      blood glucose monitoring (ACCU-CHEK EDINSON PLUS) test strip Use to test blood sugar 4 times daily 400 each 3     blood glucose monitoring (ACCU-CHEK FASTCLIX) lancets Use to test blood sugar 4 times daily or as directed.  1 box = 102 lancets 408 each 3     capsaicin (ZOSTRIX) 0.025 % CREA cream To feet 2-3 times daily as needed. 60 g 6     carvedilol (COREG) 12.5 MG tablet Take 1 tablet (12.5 mg) by mouth 2 times daily (with meals) 180 tablet 3     Cholecalciferol (VITAMIN D-3 PO) Take 2,000 Units by mouth every morning        cyanocobalamin (RA VITAMIN B-12 TR) 1000 MCG TBCR Take 1,000 mcg by mouth every morning        dapagliflozin (FARXIGA) 10 MG TABS tablet Take 1 tablet (10 mg) by mouth daily (Patient taking differently: Take 10 mg by mouth every morning ) 90 tablet 3     erythromycin (ROMYCIN) ophthalmic ointment Place into the right eye 3 times daily 1 Tube 1     insulin degludec (TRESIBA) 200 UNIT/ML pen Take 100 units daily. 100 mL 3     lactulose (CHRONULAC) 10 GM/15ML solution Take 45 mLs (30 g) by mouth 4 times daily (Patient taking differently: Take 60 g by mouth 4 times daily 2-6 TIMES A DAY) 7200 mL 11     Multiple Vitamin (THERAVITE PO) Take 1 tablet by mouth every morning        NOVOLOG FLEXPEN 100 UNIT/ML soln INJECT 1 UNIT PER 4 GRAMS OF CARBS AT MEALS AND SNACKS CORRECTION SCALE OF 1 UNITS PER 25 OVER [address]. AVE DOSE 75UNITS PER DAY 30 mL 4     OLANZapine (ZYPREXA) 2.5 MG tablet Take 1 tablet (2.5 mg) by mouth At Bedtime 90 tablet 1     omeprazole (PRILOSEC) 40 MG capsule Take 1 capsule (40 mg) by mouth daily 90 capsule 2     omeprazole (PRILOSEC) 40 MG capsule   2     potassium chloride SA (K-DUR/KLOR-CON M) 20 MEQ CR tablet Take 1 tablet (20 mEq) by mouth daily 90 tablet 2     pravastatin (PRAVACHOL) 20 MG tablet Take 1 tablet (20 mg) by mouth daily 90 tablet 3     Propylene Glycol-Glycerin (ARTIFICIAL TEARS) 1-0.3 % SOLN Apply 1 drop to eye every 2 hours (while awake) 30 mL 11     rifaximin  (XIFAXAN) 550 MG TABS tablet Take 1 tablet (550 mg) by mouth 2 times daily 60 tablet 11     tamsulosin (FLOMAX) 0.4 MG capsule Take 1 capsule (0.4 mg) by mouth daily 90 capsule 3     tamsulosin (FLOMAX) 0.4 MG capsule   2     UNABLE TO FIND 2 times daily ULTRA MALE ENHANCEMENT POTENCY         Review of Systems   No eye symptoms  No headache, change in vision, loss of peripheral vision  No neck pain, no other lumps in the neck  No change in breathing    No change in swallowing.    No swelling in extremities  No change in mental status.    Other ROS that were obtained were negative.         Past Medical History:   Diagnosis Date     Anemia 2013    Low blood plates current is 37     BPH (benign prostatic hyperplasia)      Cholelithiasis      Conductive hearing loss 8/16/2017    Have a lump on my right side of my face.  Had wax discharge     Depressive disorder 1986    Suffer effects throughout life     Gastroesophageal reflux disease 12/1/2014    Being treated with Prilosac     Hepatitis 2014    Diagnosed with schrosis ESTRADA in 2014.  Suffer from hepatatie     Hyperlipidemia      Liver cirrhosis secondary to ESTRADA (H)      Thrombocytopenia (H)      Type II diabetes mellitus (H)        Past Surgical History:   Procedure Laterality Date     ESOPHAGOSCOPY, GASTROSCOPY, DUODENOSCOPY (EGD), COMBINED N/A 11/17/2016    Procedure: COMBINED ESOPHAGOSCOPY, GASTROSCOPY, DUODENOSCOPY (EGD);  Surgeon: Santi Rosas MD;  Location:  GI     ESOPHAGOSCOPY, GASTROSCOPY, DUODENOSCOPY (EGD), COMBINED N/A 11/17/2017    Procedure: COMBINED ESOPHAGOSCOPY, GASTROSCOPY, DUODENOSCOPY (EGD);  EGD;  Surgeon: Santi Rosas MD;  Location:  GI     HEAD & NECK SURGERY       IMPLANT GOLD WEIGHT EYELID Right 11/16/2017    Procedure: IMPLANT WEIGHT EYELID;  Right Upper Eyelid Weight, right tarsal strip lower eyelid;  Surgeon: Milana Malave MD;  Location: [facility] OR     KNEE SURGERY Left      ORTHOPEDIC SURGERY       PAROTIDECTOMY,  "RADICAL NECK DISSECTION Right 2017    Procedure: PAROTIDECTOMY, RADICAL NECK DISSECTION;  Right Superfacial Parotidectomy , Facial nerve repair. with [facility] facial nerve monitor.;  Surgeon: Asiya Morgan MD;  Location: [facility] OR     PICC INSERTION Left 2017    4fr SL BioFlo PICC, 44cm in the L basilic vein w/ tip in the low SVC     VASCULAR SURGERY         Family History   Problem Relation Age of Onset     Prostate Cancer Maternal Grandfather      Substance Abuse Maternal Grandfather      Alcohol     Colon Cancer Father 60     Pancreatic Cancer Father 60     Prostate Cancer Father      Colorectal Cancer Father      Macular Degeneration Father      Cancer Father      Colorectal Cancer Maternal Grandmother      Cancer Maternal Grandmother      Substance Abuse Maternal Grandmother      Alcohol     Colorectal Cancer Paternal Grandmother      Cancer Mother      Diabetes Mother       3/2016     Cerebrovascular Disease Mother      Passed away in Feb of this year, 80 years old.     Thyroid Disease Mother      Depression Mother      Asthma Sister      Had since birth     Thyroid Disease Sister      Depression Sister      Liver Disease No family hx of      Social History     Social History     Marital status:      Spouse name: N/A     Number of children: N/A     Years of education: N/A     Occupational History     Not on file.     Social History Main Topics     Smoking status: Never Smoker     Smokeless tobacco: Former User     Types: Chew     Quit date: 10/31/2017      Comment: 1 tin per week     Alcohol use No      Comment: quit 1996     Drug use: No     Sexual activity: Not Currently     Partners: Female     Birth control/ protection: Condom     Other Topics Concern     Not on file     Social History Narrative           Objective:   /68  Pulse 70  Ht 1.753 m (5' 9.02\")  Wt 84.6 kg (186 lb 8 oz)  BMI 27.53 kg/m2   Constitutional: no distress.   EYES: anicteric, normal extra-ocular " movements, no lid lag or retraction   HEENT: Mouth/Throat: Mucous membrane is moist. Oropharynx is clear. No adenopathy. Normal thyroid   Cardiovascular: RRR, S1, S2 normal. No m/g/r   Pulmonary/Chest: CTAB. No wheezing or rales   Abdominal: +BS. Distended.   Neurological: Alert.  Extremities: No clubbing, cyanosis or inflammation   Skin: normal texture, color  Feet: Tingling +. Sensation is intact.  Due April 2019  Lymphatic: no cervical lymphadenopathy.  Psychological: appropriate mood     In House Labs:   Lab Results   Component Value Date    A1C 6.5 06/09/2017    A1C 7.8 10/25/2016          TSH   Date Value Ref Range Status   02/08/2018 0.71 0.40 - 4.00 mU/L Final   06/09/2017 0.42 0.40 - 4.00 mU/L Final       Creatinine   Date Value Ref Range Status   04/25/2018 1.21 0.66 - 1.25 mg/dL Final   ]    Recent Labs   Lab Test  10/25/16   1731   CHOL  164   HDL  33*   LDL  90   TRIG  205*       Labs pending.     A1c was 6.6, ALT normal, no proteinuria, LDL 68, normal thyroid function, slightly low hematocrit than prior values.

## 2018-08-08 NOTE — LETTER
8/8/2018       RE: Frandy Workman  [address]  [facility] [address]     Dear Colleague,    Thank you for referring your patient, Frandy Workman, to the [facility] ENDOCRINOLOGY at [facility]. Please see a copy of my visit note below.    Endocrine clinic visit    Interval history  Has been doing well.  Has been checking his blood sugars very regularly.  Overall blood sugars are well controlled.  Fasting blood sugars are dependent on the evening meals but usually between 70 and 100.  There is 2-3 days of a week where blood sugars are over 150 rarely above 200.  The evening blood sugars also remains in the same numbers although there are a few occasions where blood sugars have been over 300.    His main concern today was erectile dysfunction.  He is in a relationship and is eager to try medications for erectile dysfunction.  His libido is good and energy levels are good.  However he has hard time getting an erection.  Once he gets them, he can maintain it well.  He has tried sildenafil in the past at the lower dose without good effect.      Assessment/Treatment Plan:      Miller is a 53 year old male here for a follow up.    1. Type 2 Diabetes since early 30s with hyperglycemia and neuropathy.   Overall his blood sugar control is better since he started using his medications again.  Lab testing is still pending.    Regimen  Tresiba 100 units daily   Novolog 1 unit per 10 gm carb [at present 15] plus 1 unit per 50 over 100 (start at meals again)   Farxiga 10 mg daily   Bolus dose were reduced because of hypoglycemia. His sugars are now elevated.     Benjamin Flash -- he does all the dose calculations from the present meter and he does not want to change it.      Barriers: Stress eating   Lack of testing and bolusing.  He is doing more testing now.  BG testing 3 times a day, with meal time and correction insulin     Diabetic neuropathy  Started alpha lipoic acid 600 mg daily.      Erectile dysfunction  I gave him a prescription for Cialis to be started at 10 mg daily about 4-6 hours apart from using Flomax.  We discussed about side effects including hypotension.    Hyperlipidemia   Pravastatin 20 mg daily   Check LDL on follow up.     Jennifer Wilcox MD  [id number]  Endocrinology Service        =================================================    Endocrinology Clinic Visit    Chief Complaint: RECHECK (DIABETES TYPE 2)       Subjective:         HPI:  53 / male with history of ESTRADA with liver cirrhosis who is seen in follow up for T2DM - uncontrolled.     He has had diabetes since 2001. He was intially started on Metformin but after diagnosis of liver failure, this was discontinued.     Neuropathy - tingling in feet bilateral - worse since last visit.   Nephropathy - none  Retinopathy - no.  He will be seeing a retina specialist for a retinal hemorrhage on the left eye.  Hypoglycemia - none    Prevention  On Statin.     No change in Bowel habits. On lactulose   Appetite unchanged.   Weight gain  No neck pain or difficulty swallowing.     A1c is not a reliable indicator of glycemic control: Anemia, does not correlate with BG levels.   BG is high all day long: diet with high carb in it. He avoided snacking and lunches  ?? Poor absorption of Lantus high higher dose. -- This was changed to Tresiba  Lack of oral medication use due to liver cirrhosis. Responded well to Tresiba       Insulin regimen  Tresiba 100 units daily  Aspart 1 units per 10 gm CHO not doing regularly  Correction unclear what he is following. [? 50 mg/dl over 100]              Allergies   Allergen Reactions     Codeine Other (See Comments)     Cannot take due to liver  Cannot tolerate oral narcotics       Current Outpatient Prescriptions   Medication Sig Dispense Refill     Artificial Tear Ointment (REFRESH LACRI-LUBE) OINT Apply 1 Application to eye At Bedtime 1 Tube 3     Artificial Tear Solution (SM ARTIFICIAL TEARS) SOLN  Place 1 drop into the right eye every hour as needed Apply at least 4 times daily and as needed for dry eye 1 Bottle 3     aspirin (ASPIRIN LOW DOSE) 81 MG EC tablet Take 1 tablet (81 mg) by mouth daily 90 tablet 2     BD VIKTORIA U/F 32G X 4 MM insulin pen needle Use 5 per day 300 each 3     blood glucose monitoring (ACCU-CHEK EDINSON PLUS) test strip Use to test blood sugar 4 times daily 400 each 3     blood glucose monitoring (ACCU-CHEK FASTCLIX) lancets Use to test blood sugar 4 times daily or as directed.  1 box = 102 lancets 408 each 3     capsaicin (ZOSTRIX) 0.025 % CREA cream To feet 2-3 times daily as needed. 60 g 6     carvedilol (COREG) 12.5 MG tablet Take 1 tablet (12.5 mg) by mouth 2 times daily (with meals) 180 tablet 3     Cholecalciferol (VITAMIN D-3 PO) Take 2,000 Units by mouth every morning        cyanocobalamin (RA VITAMIN B-12 TR) 1000 MCG TBCR Take 1,000 mcg by mouth every morning        dapagliflozin (FARXIGA) 10 MG TABS tablet Take 1 tablet (10 mg) by mouth daily (Patient taking differently: Take 10 mg by mouth every morning ) 90 tablet 3     erythromycin (ROMYCIN) ophthalmic ointment Place into the right eye 3 times daily 1 Tube 1     insulin degludec (TRESIBA) 200 UNIT/ML pen Take 100 units daily. 100 mL 3     lactulose (CHRONULAC) 10 GM/15ML solution Take 45 mLs (30 g) by mouth 4 times daily (Patient taking differently: Take 60 g by mouth 4 times daily 2-6 TIMES A DAY) 7200 mL 11     Multiple Vitamin (THERAVITE PO) Take 1 tablet by mouth every morning        NOVOLOG FLEXPEN 100 UNIT/ML soln INJECT 1 UNIT PER 4 GRAMS OF CARBS AT MEALS AND SNACKS CORRECTION SCALE OF 1 UNITS PER 25 OVER [address]. AVE DOSE 75UNITS PER DAY 30 mL 4     OLANZapine (ZYPREXA) 2.5 MG tablet Take 1 tablet (2.5 mg) by mouth At Bedtime 90 tablet 1     omeprazole (PRILOSEC) 40 MG capsule Take 1 capsule (40 mg) by mouth daily 90 capsule 2     omeprazole (PRILOSEC) 40 MG capsule   2     potassium chloride SA (K-DUR/KLOR-CON M) 20  MEQ CR tablet Take 1 tablet (20 mEq) by mouth daily 90 tablet 2     pravastatin (PRAVACHOL) 20 MG tablet Take 1 tablet (20 mg) by mouth daily 90 tablet 3     Propylene Glycol-Glycerin (ARTIFICIAL TEARS) 1-0.3 % SOLN Apply 1 drop to eye every 2 hours (while awake) 30 mL 11     rifaximin (XIFAXAN) 550 MG TABS tablet Take 1 tablet (550 mg) by mouth 2 times daily 60 tablet 11     tamsulosin (FLOMAX) 0.4 MG capsule Take 1 capsule (0.4 mg) by mouth daily 90 capsule 3     tamsulosin (FLOMAX) 0.4 MG capsule   2     UNABLE TO FIND 2 times daily ULTRA MALE ENHANCEMENT POTENCY         Review of Systems   No eye symptoms  No headache, change in vision, loss of peripheral vision  No neck pain, no other lumps in the neck  No change in breathing    No change in swallowing.    No swelling in extremities  No change in mental status.    Other ROS that were obtained were negative.         Past Medical History:   Diagnosis Date     Anemia 2013    Low blood plates current is 37     BPH (benign prostatic hyperplasia)      Cholelithiasis      Conductive hearing loss 8/16/2017    Have a lump on my right side of my face.  Had wax discharge     Depressive disorder 1986    Suffer effects throughout life     Gastroesophageal reflux disease 12/1/2014    Being treated with Prilosac     Hepatitis 2014    Diagnosed with schrosis ESTRADA in 2014.  Suffer from hepatatie     Hyperlipidemia      Liver cirrhosis secondary to ESTRADA (H)      Thrombocytopenia (H)      Type II diabetes mellitus (H)        Past Surgical History:   Procedure Laterality Date     ESOPHAGOSCOPY, GASTROSCOPY, DUODENOSCOPY (EGD), COMBINED N/A 11/17/2016    Procedure: COMBINED ESOPHAGOSCOPY, GASTROSCOPY, DUODENOSCOPY (EGD);  Surgeon: Santi Rosas MD;  Location: [facility]     ESOPHAGOSCOPY, GASTROSCOPY, DUODENOSCOPY (EGD), COMBINED N/A 11/17/2017    Procedure: COMBINED ESOPHAGOSCOPY, GASTROSCOPY, DUODENOSCOPY (EGD);  EGD;  Surgeon: Santi Rosas MD;  Location: [facility]      HEAD & NECK SURGERY       IMPLANT GOLD WEIGHT EYELID Right 2017    Procedure: IMPLANT WEIGHT EYELID;  Right Upper Eyelid Weight, right tarsal strip lower eyelid;  Surgeon: Milana Malave MD;  Location: [facility] OR     KNEE SURGERY Left      ORTHOPEDIC SURGERY       PAROTIDECTOMY, RADICAL NECK DISSECTION Right 2017    Procedure: PAROTIDECTOMY, RADICAL NECK DISSECTION;  Right Superfacial Parotidectomy , Facial nerve repair. with NIM facial nerve monitor.;  Surgeon: Asiya Morgan MD;  Location: [facility] OR     PICC INSERTION Left 2017    4fr SL BioFlo PICC, 44cm in the L basilic vein w/ tip in the low SVC     VASCULAR SURGERY         Family History   Problem Relation Age of Onset     Prostate Cancer Maternal Grandfather      Substance Abuse Maternal Grandfather      Alcohol     Colon Cancer Father 60     Pancreatic Cancer Father 60     Prostate Cancer Father      Colorectal Cancer Father      Macular Degeneration Father      Cancer Father      Colorectal Cancer Maternal Grandmother      Cancer Maternal Grandmother      Substance Abuse Maternal Grandmother      Alcohol     Colorectal Cancer Paternal Grandmother      Cancer Mother      Diabetes Mother       3/2016     Cerebrovascular Disease Mother      Passed away in Feb of this year, 80 years old.     Thyroid Disease Mother      Depression Mother      Asthma Sister      Had since birth     Thyroid Disease Sister      Depression Sister      Liver Disease No family hx of      Social History     Social History     Marital status:      Spouse name: N/A     Number of children: N/A     Years of education: N/A     Occupational History     Not on file.     Social History Main Topics     Smoking status: Never Smoker     Smokeless tobacco: Former User     Types: Chew     Quit date: 10/31/2017      Comment: 1 tin per week     Alcohol use No      Comment: quit 1996     Drug use: No     Sexual activity: Not Currently     Partners: Female      "Birth control/ protection: Condom     Other Topics Concern     Not on file     Social History Narrative           Objective:   /68  Pulse 70  Ht 1.753 m (5' 9.02\")  Wt 84.6 kg (186 lb 8 oz)  BMI 27.53 kg/m2   Constitutional: no distress.   EYES: anicteric, normal extra-ocular movements, no lid lag or retraction   HEENT: Mouth/Throat: Mucous membrane is moist. Oropharynx is clear. No adenopathy. Normal thyroid   Cardiovascular: RRR, S1, S2 normal. No m/g/r   Pulmonary/Chest: CTAB. No wheezing or rales   Abdominal: +BS. Distended.   Neurological: Alert.  Extremities: No clubbing, cyanosis or inflammation   Skin: normal texture, color  Feet: Tingling +. Sensation is intact.  Due April 2019  Lymphatic: no cervical lymphadenopathy.  Psychological: appropriate mood     In House Labs:   Lab Results   Component Value Date    A1C 6.5 06/09/2017    A1C 7.8 10/25/2016          TSH   Date Value Ref Range Status   02/08/2018 0.71 0.40 - 4.00 mU/L Final   06/09/2017 0.42 0.40 - 4.00 mU/L Final       Creatinine   Date Value Ref Range Status   04/25/2018 1.21 0.66 - 1.25 mg/dL Final   ]    Recent Labs   Lab Test  10/25/16   1731   CHOL  164   HDL  33*   LDL  90   TRIG  205*       Labs pending.     A1c was 6.6, ALT normal, no proteinuria, LDL 68, normal thyroid function, slightly low hematocrit than prior values.        Again, thank you for allowing me to participate in the care of your patient.      Sincerely,    Jennifer Wilcox MD      "

## 2018-08-08 NOTE — PATIENT INSTRUCTIONS
No changes in medications.   STart Cialis at half a pill as needed, at least 4-6 hours apart from the Flomax.

## 2018-08-08 NOTE — MR AVS SNAPSHOT
After Visit Summary   8/8/2018    Frandy Workman    MRN: [id number]           Patient Information     Date Of Birth          1965        Visit Information        Provider Department      8/8/2018 1:30 PM Jennifer Wilcox MD [facility]        Today's Diagnoses     Esophageal varices without bleeding, unspecified esophageal varices type (H)    -  1    Erectile dysfunction, unspecified erectile dysfunction type          Care Instructions    No changes in medications.   STart Cialis at half a pill as needed, at least 4-6 hours apart from the Flomax.           Follow-ups after your visit        Follow-up notes from your care team     Return in about 6 months (around 2/8/2019).      Your next 10 appointments already scheduled     Aug 09, 2018 10:00 AM CDT   NEW RETINA with Enriqutea Martin MD   [facility] ([facility])    [address]  [facility] [address]   [phone]            Sep 13, 2018 12:45 PM CDT   (Arrive by 12:30 PM)   Return Visit with Lamin Gonzalez DPM   [facility] Endocrinology ([facility])    [address]  3rd Floor  [facility] [id number] [phone]            Sep 19, 2018 11:20 AM CDT   (Arrive by 11:05 AM)   Procedure with Milana Malave MD,  ENT PROCEDURE ROOM   [facility] Ear Nose and Throat ([facility])    [address] [place] [address]  4th Floor  [facility] [id number] [phone]            Oct 15, 2018  1:00 PM CDT   Lab with  LAB   [facility] Lab ([facility])    [address] [place] [address]  1st Floor  [facility] [id number] [phone]            Oct 15, 2018  2:00 PM CDT   (Arrive by 1:45 PM)   Return General Liver with Santi Dotson MD   [facility] Hepatology ([facility])    [address] [place] [address]  [address]  [facility] [id number] [phone]             "Feb 11, 2019  1:00 PM CST   (Arrive by 12:45 PM)   RETURN ENDOCRINE with Jennifer Wilcox MD   [facility] Endocrinology ([facility])    [address] [place] [address] [phone]              Future tests that were ordered for you today     Open Future Orders        Priority Expected Expires Ordered    CBC with platelets Add-On  8/8/2019 8/8/2018            Who to contact     Please call your clinic at [phone] to:    Ask questions about your health    Make or cancel appointments    Discuss your medicines    Learn about your test results    Speak to your doctor            Additional Information About Your Visit        [employer] Information     [employer] gives you secure access to your electronic health record. If you see a primary care provider, you can also send messages to your care team and make appointments. If you have questions, please call your primary care clinic.  If you do not have a primary care provider, please call [phone] and they will assist you.      [employer] is an electronic gateway that provides easy, online access to your medical records. With [employer], you can request a clinic appointment, read your test results, renew a prescription or communicate with your care team.     To access your existing account, please contact your [facility] or call [phone] for assistance.        Care EveryWhere ID     This is your Care EveryWhere ID. This could be used by other organizations to access your [place] medical records  [id number]        Your Vitals Were     Pulse Height BMI (Body Mass Index)             70 1.753 m (5' 9.02\") 27.53 kg/m2          Blood Pressure from Last 3 Encounters:   08/08/18 117/68   04/16/18 115/78   04/11/18 104/70    Weight from Last 3 Encounters:   08/08/18 84.6 kg (186 lb 8 oz)   05/16/18 85.3 kg (188 lb)   04/16/18 83.8 kg (184 lb 12.8 oz)                 Today's Medication " Changes          These changes are accurate as of 8/8/18  4:35 PM.  If you have any questions, ask your nurse or doctor.               Start taking these medicines.        Dose/Directions    tadalafil 20 MG tablet   Commonly known as:  CIALIS   Used for:  Erectile dysfunction, unspecified erectile dysfunction type   Started by:  Jennifer Wilcox MD        Dose:  20 mg   Take 1 tablet (20 mg) by mouth daily as needed   Quantity:  30 tablet   Refills:  0         These medicines have changed or have updated prescriptions.        Dose/Directions    dapagliflozin 10 MG Tabs tablet   Commonly known as:  FARXIGA   This may have changed:  when to take this   Used for:  Type 2 diabetes mellitus with hyperglycemia, with long-term current use of insulin (H)        Dose:  10 mg   Take 1 tablet (10 mg) by mouth daily   Quantity:  90 tablet   Refills:  3       lactulose 10 GM/15ML solution   Commonly known as:  CHRONULAC   This may have changed:    - how much to take  - additional instructions   Used for:  Liver cirrhosis secondary to ESTRADA (H)        Dose:  30 g   Take 45 mLs (30 g) by mouth 4 times daily   Quantity:  7200 mL   Refills:  11            Where to get your medicines      These medications were sent to [facility] [address] [place] [address] [address] [facility]  [address] [place] [address]     Phone:  [phone]     tadalafil 20 MG tablet                Primary Care Provider Office Phone # Fax #    Natalie Mitzi Andrés Russell [phone]       [address] [place] [address]        Equal Access to Services     MAYCOL RODRIGUEZ AH: Hadfrieda patel Somichael, waaxda luqadaha, qaybta kaalmada ademckay, emy vuong. So [facility] [phone].    ATENCIÓN: Si habla español, tiene a hanley disposición servicios gratuitos de asistencia lingüística. Llame al [phone].    We comply with applicable federal civil rights  laws and [place] laws. We do not discriminate on the basis of race, color, national origin, age, disability, sex, sexual orientation, or gender identity.            Thank you!     Thank you for choosing [facility] ENDOCRINOLOGY  for your care. Our goal is always to provide you with excellent care. Hearing back from our patients is one way we can continue to improve our services. Please take a few minutes to complete the written survey that you may receive in the mail after your visit with us. Thank you!             Your Updated Medication List - Protect others around you: Learn how to safely use, store and throw away your medicines at www.disposemymeds.org.          This list is accurate as of 8/8/18  4:35 PM.  Always use your most recent med list.                   Brand Name Dispense Instructions for use Diagnosis    aspirin 81 MG EC tablet    ASPIRIN LOW DOSE    90 tablet    Take 1 tablet (81 mg) by mouth daily    Type 2 diabetes mellitus without complication, with long-term current use of insulin (H)       BD VIKTORIA U/F 32G X 4 MM   Generic drug:  insulin pen needle     300 each    Use 5 per day    Type 2 diabetes mellitus without complication, with long-term current use of insulin (H)       blood glucose monitoring lancets     408 each    Use to test blood sugar 4 times daily or as directed.  1 box = 102 lancets    Type II diabetes mellitus (H)       blood glucose monitoring test strip    ACCU-CHEK EDINSON PLUS    400 each    Use to test blood sugar 4 times daily    Type II diabetes mellitus (H)       capsaicin 0.025 % Crea cream    ZOSTRIX    60 g    To feet 2-3 times daily as needed.    Type II or unspecified type diabetes mellitus with neurological manifestations, not stated as uncontrolled(250.60) (H)       carvedilol 12.5 MG tablet    COREG    180 tablet    Take 1 tablet (12.5 mg) by mouth 2 times daily (with meals)    Liver cirrhosis secondary to ESTRADA (H), Secondary esophageal varices without bleeding (H)        dapagliflozin 10 MG Tabs tablet    FARXIGA    90 tablet    Take 1 tablet (10 mg) by mouth daily    Type 2 diabetes mellitus with hyperglycemia, with long-term current use of insulin (H)       erythromycin ophthalmic ointment    ROMYCIN    1 Tube    Place into the right eye 3 times daily    Post-operative state       insulin degludec 200 UNIT/ML pen    TRESIBA    100 mL    Take 100 units daily.    Type 2 diabetes mellitus with hyperglycemia, with long-term current use of insulin (H)       lactulose 10 GM/15ML solution    CHRONULAC    7200 mL    Take 45 mLs (30 g) by mouth 4 times daily    Liver cirrhosis secondary to ESTRADA (H)       NovoLOG FLEXPEN 100 UNIT/ML injection   Generic drug:  insulin aspart     30 mL    INJECT 1 UNIT PER 4 GRAMS OF CARBS AT MEALS AND SNACKS CORRECTION SCALE OF 1 UNITS PER 25 OVER [address]. AVE DOSE 75UNITS PER DAY    Type II diabetes mellitus (H)       OLANZapine 2.5 MG tablet    zyPREXA    90 tablet    Take 1 tablet (2.5 mg) by mouth At Bedtime    Insomnia, unspecified type       * omeprazole 40 MG capsule    priLOSEC          * omeprazole 40 MG capsule    priLOSEC    90 capsule    Take 1 capsule (40 mg) by mouth daily    Gastroesophageal reflux disease, esophagitis presence not specified       potassium chloride SA 20 MEQ CR tablet    K-DUR/KLOR-CON M    90 tablet    Take 1 tablet (20 mEq) by mouth daily    Hypokalemia       pravastatin 20 MG tablet    PRAVACHOL    90 tablet    Take 1 tablet (20 mg) by mouth daily    Hyperlipidemia LDL goal <100       Propylene Glycol-Glycerin 1-0.3 % Soln    ARTIFICIAL TEARS    30 mL    Apply 1 drop to eye every 2 hours (while awake)    Post-operative state       RA VITAMIN B-12 TR 1000 MCG Tbcr   Generic drug:  cyanocobalamin      Take 1,000 mcg by mouth every morning        REFRESH LACRI-LUBE Oint     1 Tube    Apply 1 Application to eye At Bedtime    Post-operative state       rifaximin 550 MG Tabs tablet    XIFAXAN    60 tablet    Take 1 tablet (550  mg) by mouth 2 times daily    Hepatic encephalopathy (H)       SM ARTIFICIAL TEARS Soln     1 Bottle    Place 1 drop into the right eye every hour as needed Apply at least 4 times daily and as needed for dry eye    Dry eyes       tadalafil 20 MG tablet    CIALIS    30 tablet    Take 1 tablet (20 mg) by mouth daily as needed    Erectile dysfunction, unspecified erectile dysfunction type       * tamsulosin 0.4 MG capsule    FLOMAX          * tamsulosin 0.4 MG capsule    FLOMAX    90 capsule    Take 1 capsule (0.4 mg) by mouth daily    Benign prostatic hyperplasia with lower urinary tract symptoms       THERAVITE PO      Take 1 tablet by mouth every morning        UNABLE TO FIND      2 times daily ULTRA MALE ENHANCEMENT POTENCY        VITAMIN D-3 PO      Take 2,000 Units by mouth every morning        * Notice:  This list has 4 medication(s) that are the same as other medications prescribed for you. Read the directions carefully, and ask your doctor or other care provider to review them with you.

## 2018-08-09 ENCOUNTER — OFFICE VISIT (OUTPATIENT)
Dept: OPHTHALMOLOGY | Facility: CLINIC | Age: 53
End: 2018-08-09
Attending: OPTOMETRIST
Payer: MEDICARE

## 2018-08-09 ENCOUNTER — TELEPHONE (OUTPATIENT)
Dept: ENDOCRINOLOGY | Facility: CLINIC | Age: 53
End: 2018-08-09

## 2018-08-09 DIAGNOSIS — E11.3213 TYPE 2 DIABETES MELLITUS WITH MILD NONPROLIFERATIVE RETINOPATHY OF BOTH EYES AND MACULAR EDEMA, UNSPECIFIED LONG TERM INSULIN USE STATUS: ICD-10-CM

## 2018-08-09 DIAGNOSIS — H35.81 RETINAL EDEMA: Primary | ICD-10-CM

## 2018-08-09 PROBLEM — N52.1 ERECTILE DYSFUNCTION DUE TO DISEASES CLASSIFIED ELSEWHERE: Status: ACTIVE | Noted: 2018-08-09

## 2018-08-09 PROCEDURE — G0463 HOSPITAL OUTPT CLINIC VISIT: HCPCS | Mod: 25,ZF

## 2018-08-09 PROCEDURE — C9257 BEVACIZUMAB INJECTION: HCPCS | Mod: ZF | Performed by: OPHTHALMOLOGY

## 2018-08-09 PROCEDURE — 92134 CPTRZ OPH DX IMG PST SGM RTA: CPT | Mod: ZF | Performed by: OPHTHALMOLOGY

## 2018-08-09 PROCEDURE — 67028 INJECTION EYE DRUG: CPT | Mod: ZF | Performed by: OPHTHALMOLOGY

## 2018-08-09 PROCEDURE — 92235 FLUORESCEIN ANGRPH MLTIFRAME: CPT | Mod: ZF | Performed by: OPHTHALMOLOGY

## 2018-08-09 PROCEDURE — 25000128 H RX IP 250 OP 636: Mod: ZF | Performed by: OPHTHALMOLOGY

## 2018-08-09 RX ADMIN — Medication 1.25 MG: at 13:15

## 2018-08-09 ASSESSMENT — REFRACTION_WEARINGRX
OS_SPHERE: -7.25
OD_ADD: +2.00
OS_AXIS: 040
SPECS_TYPE: PAL
OS_ADD: +2.00
OD_CYLINDER: +0.75
OD_SPHERE: -7.25
OS_CYLINDER: +0.75
OD_AXIS: 132

## 2018-08-09 ASSESSMENT — TONOMETRY
OS_IOP_MMHG: 10
OD_IOP_MMHG: 10
IOP_METHOD: TONOPEN

## 2018-08-09 ASSESSMENT — VISUAL ACUITY
OD_CC: 20/25
METHOD: SNELLEN - LINEAR
OS_CC: 20/25
CORRECTION_TYPE: GLASSES

## 2018-08-09 ASSESSMENT — CONF VISUAL FIELD
OS_NORMAL: 1
OD_NORMAL: 1

## 2018-08-09 ASSESSMENT — EXTERNAL EXAM - LEFT EYE: OS_EXAM: NORMAL

## 2018-08-09 ASSESSMENT — SLIT LAMP EXAM - LIDS
COMMENTS: NORMAL
COMMENTS: SMALL PAPILLOMA ALONG LL MARGIN

## 2018-08-09 ASSESSMENT — CUP TO DISC RATIO
OS_RATIO: 0.3
OD_RATIO: 0.25

## 2018-08-09 ASSESSMENT — EXTERNAL EXAM - RIGHT EYE: OD_EXAM: NORMAL

## 2018-08-09 NOTE — TELEPHONE ENCOUNTER
PA Initiation    Medication: Cialis 20mg -   Insurance Company: [employer] - Phone [phone] Fax [phone]  Pharmacy Filling the Rx: [facility] [address] [place], [place] [address]  AT [facility]  Filling Pharmacy Phone: [phone]  Filling Pharmacy Fax: [phone]  Start Date: 8/9/2018

## 2018-08-09 NOTE — MR AVS SNAPSHOT
After Visit Summary   8/9/2018    Frandy Workman    MRN: [id number]           Patient Information     Date Of Birth          1965        Visit Information        Provider Department      8/9/2018 10:00 AM [facility]        Today's Diagnoses     Retinal edema    -  1    Type 2 diabetes mellitus with mild nonproliferative retinopathy of both eyes and macular edema, unspecified long term insulin use status (H) - Both Eyes           Follow-ups after your visit        Follow-up notes from your care team     Return in about 4 weeks (around 9/6/2018).      Your next 10 appointments already scheduled     Sep 05, 2018  1:00 PM CDT   RETURN RETINA with Enriqueta Martin MD   [facility] Clinic ([facility])    [address]  [facility] [address]   [phone]            Sep 13, 2018 12:45 PM CDT   (Arrive by 12:30 PM)   Return Visit with Lamin Gonzalez DPM   [facility] Endocrinology ([facility])    [address]  3rd Floor  [facility] [address]   [phone]            Sep 19, 2018 11:20 AM CDT   (Arrive by 11:05 AM)   Procedure with Milana Malave MD,  ENT PROCEDURE ROOM   [facility] Ear Nose and Throat ([facility])    [address]  4th Floor  [facility] [address]   [phone]            Oct 15, 2018  1:00 PM CDT   Lab with  LAB   [facility] Lab ([facility])    [address]  1st Floor  [facility] [address]   [phone]            Oct 15, 2018  2:00 PM CDT   (Arrive by 1:45 PM)   Return General Liver with Santi Dotson MD   [facility] Hepatology ([facility])    [address]  [address]  [facility] [address]   [phone]            Feb 11, 2019  1:00 PM CST   (Arrive by 12:45 PM)   RETURN ENDOCRINE with Jennifer Wilcox MD   [facility]  Endocrinology ([facility])    [address]  [address]rd [facility] [address] [phone]              Who to contact     Please call your clinic at [phone] to:    Ask questions about your health    Make or cancel appointments    Discuss your medicines    Learn about your test results    Speak to your doctor            Additional Information About Your Visit        [employer] Information     [employer] gives you secure access to your electronic health record. If you see a primary care provider, you can also send messages to your care team and make appointments. If you have questions, please call your primary care clinic.  If you do not have a primary care provider, please call [phone] and they will assist you.      [employer] is an electronic gateway that provides easy, online access to your medical records. With [employer], you can request a clinic appointment, read your test results, renew a prescription or communicate with your care team.     To access your existing account, please contact your [facility] or call [phone] for assistance.        Care EveryWhere ID     This is your Care EveryWhere ID. This could be used by other organizations to access your [place] medical records  [id number]         Blood Pressure from Last 3 Encounters:   08/08/18 117/68   04/16/18 115/78   04/11/18 104/70    Weight from Last 3 Encounters:   08/08/18 84.6 kg (186 lb 8 oz)   05/16/18 85.3 kg (188 lb)   04/16/18 83.8 kg (184 lb 12.8 oz)              We Performed the Following     Avastin (Bevacizumab) 1.25MG Intravitreal Injection OS (left eye)     Fluorescein Angiography OU (both eyes)     OCT Retina Spectralis OU (both eyes)          Today's Medication Changes          These changes are accurate as of 8/9/18  1:18 PM.  If you have any questions, ask your nurse or doctor.               These medicines have changed or have updated prescriptions.         Dose/Directions    dapagliflozin 10 MG Tabs tablet   Commonly known as:  FARXIGA   This may have changed:  when to take this   Used for:  Type 2 diabetes mellitus with hyperglycemia, with long-term current use of insulin (H)        Dose:  10 mg   Take 1 tablet (10 mg) by mouth daily   Quantity:  90 tablet   Refills:  3       lactulose 10 GM/15ML solution   Commonly known as:  CHRONULAC   This may have changed:    - how much to take  - additional instructions   Used for:  Liver cirrhosis secondary to ESTRADA (H)        Dose:  30 g   Take 45 mLs (30 g) by mouth 4 times daily   Quantity:  7200 mL   Refills:  11                Primary Care Provider Office Phone # Fax #    Natalie Mitzi Russell [phone]       [address] [place] [address] [address] [place] [address]        Equal Access to Services     MINDI RODRIGUEZ : Amelia Vanessa, waaxda luqadaha, qaybta kaalmaluis valle, emy mcdonald . So [facility] [phone].    ATENCIÓN: Si habla español, tiene a hanley disposición servicios gratuitos de asistencia lingüística. [facility] [phone].    We comply with applicable federal civil rights laws and [place] laws. We do not discriminate on the basis of race, color, national origin, age, disability, sex, sexual orientation, or gender identity.            Thank you!     Thank you for choosing [facility]  for your care. Our goal is always to provide you with excellent care. Hearing back from our patients is one way we can continue to improve our services. Please take a few minutes to complete the written survey that you may receive in the mail after your visit with us. Thank you!             Your Updated Medication List - Protect others around you: Learn how to safely use, store and throw away your medicines at www.disposemymeds.org.          This list is accurate as of 8/9/18  1:18 PM.  Always use your most recent med list.                   Brand Name Dispense Instructions  for use Diagnosis    aspirin 81 MG EC tablet    ASPIRIN LOW DOSE    90 tablet    Take 1 tablet (81 mg) by mouth daily    Type 2 diabetes mellitus without complication, with long-term current use of insulin (H)       BD VIKTORIA U/F 32G X 4 MM   Generic drug:  insulin pen needle     300 each    Use 5 per day    Type 2 diabetes mellitus without complication, with long-term current use of insulin (H)       blood glucose monitoring lancets     408 each    Use to test blood sugar 4 times daily or as directed.  1 box = 102 lancets    Type II diabetes mellitus (H)       blood glucose monitoring test strip    ACCU-CHEK EDINSON PLUS    400 each    Use to test blood sugar 4 times daily    Type II diabetes mellitus (H)       capsaicin 0.025 % Crea cream    ZOSTRIX    60 g    To feet 2-3 times daily as needed.    Type II or unspecified type diabetes mellitus with neurological manifestations, not stated as uncontrolled(250.60) (H)       carvedilol 12.5 MG tablet    COREG    180 tablet    Take 1 tablet (12.5 mg) by mouth 2 times daily (with meals)    Liver cirrhosis secondary to ESTRADA (H), Secondary esophageal varices without bleeding (H)       dapagliflozin 10 MG Tabs tablet    FARXIGA    90 tablet    Take 1 tablet (10 mg) by mouth daily    Type 2 diabetes mellitus with hyperglycemia, with long-term current use of insulin (H)       erythromycin ophthalmic ointment    ROMYCIN    1 Tube    Place into the right eye 3 times daily    Post-operative state       insulin degludec 200 UNIT/ML pen    TRESIBA    100 mL    Take 100 units daily.    Type 2 diabetes mellitus with hyperglycemia, with long-term current use of insulin (H)       lactulose 10 GM/15ML solution    CHRONULAC    7200 mL    Take 45 mLs (30 g) by mouth 4 times daily    Liver cirrhosis secondary to ESTRADA (H)       NovoLOG FLEXPEN 100 UNIT/ML injection   Generic drug:  insulin aspart     30 mL    INJECT 1 UNIT PER 4 GRAMS OF CARBS AT MEALS AND SNACKS CORRECTION SCALE OF 1 UNITS  PER 25 OVER [address] DOSE 75UNITS PER DAY    Type II diabetes mellitus (H)       OLANZapine 2.5 MG tablet    zyPREXA    90 tablet    Take 1 tablet (2.5 mg) by mouth At Bedtime    Insomnia, unspecified type       * omeprazole 40 MG capsule    priLOSEC          * omeprazole 40 MG capsule    priLOSEC    90 capsule    Take 1 capsule (40 mg) by mouth daily    Gastroesophageal reflux disease, esophagitis presence not specified       potassium chloride SA 20 MEQ CR tablet    K-DUR/KLOR-CON M    90 tablet    Take 1 tablet (20 mEq) by mouth daily    Hypokalemia       pravastatin 20 MG tablet    PRAVACHOL    90 tablet    Take 1 tablet (20 mg) by mouth daily    Hyperlipidemia LDL goal <100       Propylene Glycol-Glycerin 1-0.3 % Soln    ARTIFICIAL TEARS    30 mL    Apply 1 drop to eye every 2 hours (while awake)    Post-operative state       RA VITAMIN B-12 TR 1000 MCG Tbcr   Generic drug:  cyanocobalamin      Take 1,000 mcg by mouth every morning        REFRESH LACRI-LUBE Oint     1 Tube    Apply 1 Application to eye At Bedtime    Post-operative state       rifaximin 550 MG Tabs tablet    XIFAXAN    60 tablet    Take 1 tablet (550 mg) by mouth 2 times daily    Hepatic encephalopathy (H)       SM ARTIFICIAL TEARS Soln     1 Bottle    Place 1 drop into the right eye every hour as needed Apply at least 4 times daily and as needed for dry eye    Dry eyes       tadalafil 20 MG tablet    CIALIS    30 tablet    Take 1 tablet (20 mg) by mouth daily as needed    Erectile dysfunction, unspecified erectile dysfunction type       * tamsulosin 0.4 MG capsule    FLOMAX          * tamsulosin 0.4 MG capsule    FLOMAX    90 capsule    Take 1 capsule (0.4 mg) by mouth daily    Benign prostatic hyperplasia with lower urinary tract symptoms       THERAVITE PO      Take 1 tablet by mouth every morning        UNABLE TO FIND      2 times daily ULTRA MALE ENHANCEMENT POTENCY        VITAMIN D-3 PO      Take 2,000 Units by mouth every morning        *  Notice:  This list has 4 medication(s) that are the same as other medications prescribed for you. Read the directions carefully, and ask your doctor or other care provider to review them with you.

## 2018-08-09 NOTE — TELEPHONE ENCOUNTER
PA needed for  Cilais 20 mg  1 tablet  By mouth before activity  Qty 30     Has used sildenifil ( viagra)  in the past but was not  effective. DX Erectle dysfunction N52.1 not sure if it's the  QTy of 30  That has it denied  But if so need to know what  QTy is  covered

## 2018-08-09 NOTE — PROGRESS NOTES
CC:  Consult for nonproliferative diabetic retinopathy and Diabetic macular edema   HPI: Frandy Workman is a  53 year old year-old patient with history of Diabetes mellitus for 24 ys . Patient on insulin.  HBA1c 6.6 08/08/18. Patient was evaluated by dr. Amezquita and sent to the retina clinic for management of Diabetic macular edema     Retinal Imaging:  OCT 08/09/18   RE: tiny cyst good foveal contour  LE: central cystic changes and new subretinal fluid - increased compared to last Optical Coherence Tomography from 7.11.18    fluorescein angiography transits left eye 08/09/18 :  Right eye: diffuse microaneurysms, late macula leakage; few peripheral capillary non perfusion   Left eye: diffuse microaneurysms, late macula leakage; few peripheral capillary non perfusion     Assessment & Plan:    1.  Moderate nonproliferative diabetic retinopathy of both eyes   Blood pressure (<120/80) and blood glucose (HbA1c <7.0) control discussed with patient. Patient advised that failure to adequately control each may lead to vision loss. The patient expressed understanding.    2. Diabetic macular edema left eye   Discussed with patient treatment options Anti-VEGF; laser and ozurdex vs close observation   The decision was to proceed with Avastin inj left eye today (1 out of 3 )         3. Myopia, bilateral  Prescription given last yuval     4. Senile nuclear sclerosis, bilateral  Comment: Not visually significant OU  Plan:  No treatment indicated. Monitor annually.    Planf: follow up in one mo for inj only left eye (2 out of 3)    ~~~~~~~~~~~~~~~~~~~~~~~~~~~~~~~~~~   Complete documentation of historical and exam elements from today's encounter can be found in the full encounter summary report (not reduplicated in this progress note).  I personally obtained the chief complaint(s) and history of present illness.  I confirmed and edited as necessary the review of systems, past medical/surgical history, family history, social  history, and examination findings as documented by others; and I examined the patient myself.  I personally reviewed the relevant tests, images, and reports as documented above.  I personally reviewed the ophthalmic test(s) associated with this encounter, agree with the interpretation(s) as documented by the resident/fellow, and have edited the corresponding report(s) as necessary.   I formulated and edited as necessary the assessment and plan and discussed the findings and management plan with the patient and family and No resident or fellow assisted with the procedures performed.  I performed the procedures myself.    Enriqueta Martin MD   of Ophthalmology.  Retina Service   Department of Ophthalmology and Visual Neurosciences   [facility]  Phone: [phone]   Fax: [phone]

## 2018-08-09 NOTE — NURSING NOTE
Chief Complaints and History of Present Illnesses   Patient presents with     Follow Up For      Moderate nonproliferative diabetic retinopathy of both eyes with macular edema associated with type 2 diabetes mellitus      HPI    Affected eye(s):  Both   Symptoms:        Duration:  1 year   Frequency:  Constant       Do you have eye pain now?:  No      Comments:  Pt. States that he went in for routine exam and was told there were diabetic related issues LE.  Occasional flashes and floaters BE.  Extreme photophobia.  Yvonne Dickinson COT 10:44 AM August 9, 2018

## 2018-08-10 DIAGNOSIS — N52.9 ED (ERECTILE DYSFUNCTION): Primary | ICD-10-CM

## 2018-08-10 NOTE — TELEPHONE ENCOUNTER
Medications for erectile dysfunction  not covered by insurance.  Will mail him a script for sildenafil 20 mg  2-3 tabs before activity Qty 90 to pay CASH with  Good RX coupon at a [facility] or [facility]

## 2018-08-10 NOTE — TELEPHONE ENCOUNTER
PRIOR AUTHORIZATION DENIED    Medication: Cialis 20mg - DENIED    Denial Date: 8/9/2018    Denial Rational:          Appeal Information:

## 2018-08-11 DIAGNOSIS — G47.00 INSOMNIA, UNSPECIFIED TYPE: ICD-10-CM

## 2018-08-13 DIAGNOSIS — G47.00 INSOMNIA, UNSPECIFIED TYPE: ICD-10-CM

## 2018-08-13 RX ORDER — SILDENAFIL CITRATE 20 MG/1
TABLET ORAL
Qty: 90 TABLET | Refills: 3 | Status: ON HOLD | OUTPATIENT
Start: 2018-08-13 | End: 2019-02-26

## 2018-08-13 RX ORDER — OLANZAPINE 2.5 MG/1
2.5 TABLET, FILM COATED ORAL AT BEDTIME
Qty: 30 TABLET | Refills: 0 | Status: SHIPPED | OUTPATIENT
Start: 2018-08-13 | End: 2018-09-15

## 2018-08-13 NOTE — TELEPHONE ENCOUNTER
Called patient and left a message to call back to schedule follow up appointment in Primary Care Clinic with another provider as PCP is out until November. Gave clinic number to call back for appointment.

## 2018-08-14 NOTE — PROGRESS NOTES
Dear Miller,  Your counts remain unchanged compared to previous labs.  Your hematocrit dropped slightly from the labs compared 4 months ago but similar labs were noted about 9 months ago.  If you have any symptoms, black stools, bloody stools please contact primary care physician immediately.   With Best Regards,   Jennifer Wilcox MD  Endocrinology Service.

## 2018-08-15 RX ORDER — OLANZAPINE 2.5 MG/1
TABLET, FILM COATED ORAL
Qty: 90 TABLET | Refills: 0 | OUTPATIENT
Start: 2018-08-15

## 2018-08-15 NOTE — TELEPHONE ENCOUNTER
Called patient and left message to call back and schedule follow up appt in the Primary Care Clinic.

## 2018-08-15 NOTE — TELEPHONE ENCOUNTER
Sent 30 day supply on 8/13/2018. Patient overdue for appt. LVD 2/2018  .Scheduling has been notified to contact the pt for appointment.

## 2018-08-23 ENCOUNTER — MEDICAL CORRESPONDENCE (OUTPATIENT)
Dept: HEALTH INFORMATION MANAGEMENT | Facility: CLINIC | Age: 53
End: 2018-08-23

## 2018-08-23 DIAGNOSIS — Z79.4 TYPE 2 DIABETES MELLITUS WITH HYPERGLYCEMIA, WITH LONG-TERM CURRENT USE OF INSULIN (H): ICD-10-CM

## 2018-08-23 DIAGNOSIS — E11.65 TYPE 2 DIABETES MELLITUS WITH HYPERGLYCEMIA, WITH LONG-TERM CURRENT USE OF INSULIN (H): ICD-10-CM

## 2018-08-23 RX ORDER — DAPAGLIFLOZIN 10 MG/1
10 TABLET, FILM COATED ORAL DAILY
Qty: 90 TABLET | Refills: 3 | Status: SHIPPED | OUTPATIENT
Start: 2018-08-23 | End: 2019-08-28

## 2018-09-05 ENCOUNTER — OFFICE VISIT (OUTPATIENT)
Dept: OPHTHALMOLOGY | Facility: CLINIC | Age: 53
End: 2018-09-05
Attending: OPHTHALMOLOGY
Payer: MEDICARE

## 2018-09-05 DIAGNOSIS — E11.3213 TYPE 2 DIABETES MELLITUS WITH MILD NONPROLIFERATIVE RETINOPATHY OF BOTH EYES AND MACULAR EDEMA, UNSPECIFIED LONG TERM INSULIN USE STATUS: Primary | ICD-10-CM

## 2018-09-05 PROCEDURE — C9257 BEVACIZUMAB INJECTION: HCPCS | Mod: ZF | Performed by: OPHTHALMOLOGY

## 2018-09-05 PROCEDURE — 40000269 ZZH STATISTIC NO CHARGE FACILITY FEE: Mod: ZF

## 2018-09-05 PROCEDURE — 25000128 H RX IP 250 OP 636: Mod: ZF | Performed by: OPHTHALMOLOGY

## 2018-09-05 PROCEDURE — 67028 INJECTION EYE DRUG: CPT | Mod: LT,ZF | Performed by: OPHTHALMOLOGY

## 2018-09-05 RX ADMIN — Medication 1.25 MG: at 13:11

## 2018-09-05 ASSESSMENT — VISUAL ACUITY
CORRECTION_TYPE: GLASSES
METHOD: SNELLEN - LINEAR
OD_CC: 20/30
OS_CC+: +1
OS_CC: 20/30
OD_CC+: -1

## 2018-09-05 ASSESSMENT — TONOMETRY
OD_IOP_MMHG: 12
IOP_METHOD: TONOPEN
OS_IOP_MMHG: 12

## 2018-09-05 NOTE — MR AVS SNAPSHOT
After Visit Summary   9/5/2018    Frandy Workman    MRN: [id number]           Patient Information     Date Of Birth          1965        Visit Information        Provider Department      9/5/2018 1:00 PM [facility]qu[facility]        Today's Diagnoses     Type 2 diabetes mellitus with mild nonproliferative retinopathy of both eyes and macular edema, unspecified long term insulin use status (H)    -  1       Follow-ups after your visit        Your next 10 appointments already scheduled     Sep 13, 2018 12:45 PM CDT   (Arrive by 12:30 PM)   Return Visit with Lamin Gonzalez DPM   [facility] End[facility] ([facility])    [address]  3rd Floor  [place] [facility] [address] [phone]            Sep 15, 2018 11:20 AM CDT   (Arrive by 11:05 AM)   Return Visit with Nerissa Moe MD   [facility] ([facility])    [address]  4th Floor  [place] [id number] [phone]            Sep 19, 2018 11:20 AM CDT   (Arrive by 11:05 AM)   Procedure with Milana Malave MD,  ENT PROCEDURE ROOM   [facility] Ear Nose [facility] Throat ([facility])    [address]  4th Floor  [place] [id number] [phone]            Oct 03, 2018  1:00 PM CDT   RETURN RETINA with Enriqueta Martin MD   [facility] ([facility])    [address]  [address] [address]  [place] [address]   [phone]            Oct 15, 2018  1:00 PM CDT   Lab with  LAB   [facility] Lab ([facility])    [address]  1st Floor  [place] [id number] [phone]            Oct 15, 2018  2:00 PM CDT   (Arrive by 1:45 PM)   Return General Liver with Santi Dotson MD   [facility] Hepatology ([facility])    [address]  [address]  [place] [id number] [phone]             Feb 11, 2019  1:00 PM CST   (Arrive by 12:45 PM)   RETURN ENDOCRINE with Jennifer Wilcox MD   [facility]ology ([facility])    [address] [address] [phone]              Who to contact     Please call your clinic at [phone] to:    Ask questions about your health    Make or cancel appointments    Discuss your medicines    Learn about your test results    Speak to your doctor            Additional Information About Your Visit        [employer] Information     [employer] gives you secure access to your electronic health record. If you see a primary care provider, you can also send messages to your care team and make appointments. If you have questions, please call your primary care clinic.  If you do not have a primary care provider, please call [phone] and they will assist you.      [employer] is an electronic gateway that provides easy, online access to your medical records. With [employer], you can request a clinic appointment, read your test results, renew a prescription or communicate with your care team.     To access your existing account, please contact your [facility] or call [phone] for assistance.        Care EveryWhere ID     This is your Care EveryWhere ID. This could be used by other organizations to access your [facility] medical records  [id number]         Blood Pressure from Last 3 Encounters:   08/08/18 117/68   04/16/18 115/78   04/11/18 104/70    Weight from Last 3 Encounters:   08/08/18 84.6 kg (186 lb 8 oz)   05/16/18 85.3 kg (188 lb)   04/16/18 83.8 kg (184 lb 12.8 oz)              We Performed the Following     Avastin (Bevacizumab) 1.25MG Intravitreal Injection OS (left eye)          Today's Medication Changes          These changes are accurate as of 9/5/18 10:37 PM.  If you have any questions, ask your nurse or doctor.               These medicines have changed or have  updated prescriptions.        Dose/Directions    lactulose 10 GM/15ML solution   Commonly known as:  CHRONULAC   This may have changed:    - how much to take  - additional instructions   Used for:  Liver cirrhosis secondary to ESTRADA (H)        Dose:  30 g   Take 45 mLs (30 g) by mouth 4 times daily   Quantity:  7200 mL   Refills:  11                Primary Care Provider Office Phone # Fax #    Natalie Mitzi Andrés Russell [phone]       [address] [place] [address] [address] [place] [id number]        Equal Access to Services     MINDI RODRIGUEZ : Hadii aad ku hadasho Soomaali, waaxda luqadaha, qaybta kaalmada adeegyada, waxay lincoln haysilvian osbaldo mcdonald . So [facility] [phone].    ATENCIÓN: Si habla español, tiene a hanley disposición servicios gratuitos de asistencia lingüística. [facility] [phone].    We comply with applicable federal civil rights laws and [place] laws. We do not discriminate on the basis of race, color, national origin, age, disability, sex, sexual orientation, or gender identity.            Thank you!     Thank you for choosing [facility]IC  for your care. Our goal is always to provide you with excellent care. Hearing back from our patients is one way we can continue to improve our services. Please take a few minutes to complete the written survey that you may receive in the mail after your visit with us. Thank you!             Your Updated Medication List - Protect others around you: Learn how to safely use, store and throw away your medicines at www.disposemymeds.org.          This list is accurate as of 9/5/18 10:37 PM.  Always use your most recent med list.                   Brand Name Dispense Instructions for use Diagnosis    aspirin 81 MG EC tablet    ASPIRIN LOW DOSE    90 tablet    Take 1 tablet (81 mg) by mouth daily    Type 2 diabetes mellitus without complication, with long-term current use of insulin (H)       BD VIKTORIA U/F 32G X 4 MM   Generic drug:  insulin pen needle      300 each    Use 5 per day    Type 2 diabetes mellitus without complication, with long-term current use of insulin (H)       blood glucose monitoring lancets     408 each    Use to test blood sugar 4 times daily or as directed.  1 box = 102 lancets    Type II diabetes mellitus (H)       blood glucose monitoring test strip    ACCU-CHEK EDINSON PLUS    400 each    Use to test blood sugar 4 times daily    Type II diabetes mellitus (H)       capsaicin 0.025 % Crea cream    ZOSTRIX    60 g    To feet 2-3 times daily as needed.    Type II or unspecified type diabetes mellitus with neurological manifestations, not stated as uncontrolled(250.60) (H)       carvedilol 12.5 MG tablet    COREG    180 tablet    Take 1 tablet (12.5 mg) by mouth 2 times daily (with meals)    Liver cirrhosis secondary to ESTRADA (H), Secondary esophageal varices without bleeding (H)       dapagliflozin 10 MG Tabs tablet    FARXIGA    90 tablet    Take 1 tablet (10 mg) by mouth daily    Type 2 diabetes mellitus with hyperglycemia, with long-term current use of insulin (H)       erythromycin ophthalmic ointment    ROMYCIN    1 Tube    Place into the right eye 3 times daily    Post-operative state       insulin degludec 200 UNIT/ML pen    TRESIBA    100 mL    Take 100 units daily.    Type 2 diabetes mellitus with hyperglycemia, with long-term current use of insulin (H)       lactulose 10 GM/15ML solution    CHRONULAC    7200 mL    Take 45 mLs (30 g) by mouth 4 times daily    Liver cirrhosis secondary to ESTRADA (H)       NovoLOG FLEXPEN 100 UNIT/ML injection   Generic drug:  insulin aspart     30 mL    INJECT 1 UNIT PER 4 GRAMS OF CARBS AT MEALS AND SNACKS CORRECTION SCALE OF 1 UNITS PER 25 OVER [address] DOSE 75UNITS PER DAY    Type II diabetes mellitus (H)       OLANZapine 2.5 MG tablet    zyPREXA    30 tablet    Take 1 tablet (2.5 mg) by mouth At Bedtime    Insomnia, unspecified type       * omeprazole 40 MG capsule    priLOSEC          * omeprazole 40 MG  capsule    priLOSEC    90 capsule    Take 1 capsule (40 mg) by mouth daily    Gastroesophageal reflux disease, esophagitis presence not specified       potassium chloride SA 20 MEQ CR tablet    K-DUR/KLOR-CON M    90 tablet    Take 1 tablet (20 mEq) by mouth daily    Hypokalemia       pravastatin 20 MG tablet    PRAVACHOL    90 tablet    Take 1 tablet (20 mg) by mouth daily    Hyperlipidemia LDL goal <100       Propylene Glycol-Glycerin 1-0.3 % Soln    ARTIFICIAL TEARS    30 mL    Apply 1 drop to eye every 2 hours (while awake)    Post-operative state       RA VITAMIN B-12 TR 1000 MCG Tbcr   Generic drug:  cyanocobalamin      Take 1,000 mcg by mouth every morning        REFRESH LACRI-LUBE Oint     1 Tube    Apply 1 Application to eye At Bedtime    Post-operative state       rifaximin 550 MG Tabs tablet    XIFAXAN    60 tablet    Take 1 tablet (550 mg) by mouth 2 times daily    Hepatic encephalopathy (H)       sildenafil 20 MG tablet    REVATIO    90 tablet    Take 2-3  Tablets before activity by mouth    ED (erectile dysfunction)       SM ARTIFICIAL TEARS Soln     1 Bottle    Place 1 drop into the right eye every hour as needed Apply at least 4 times daily and as needed for dry eye    Dry eyes       tadalafil 20 MG tablet    CIALIS    30 tablet    Take 1 tablet (20 mg) by mouth daily as needed    Erectile dysfunction, unspecified erectile dysfunction type       * tamsulosin 0.4 MG capsule    FLOMAX          * tamsulosin 0.4 MG capsule    FLOMAX    90 capsule    Take 1 capsule (0.4 mg) by mouth daily    Benign prostatic hyperplasia with lower urinary tract symptoms       THERAVITE PO      Take 1 tablet by mouth every morning        UNABLE TO FIND      2 times daily ULTRA MALE ENHANCEMENT POTENCY        VITAMIN D-3 PO      Take 2,000 Units by mouth every morning        * Notice:  This list has 4 medication(s) that are the same as other medications prescribed for you. Read the directions carefully, and ask your doctor  or other care provider to review them with you.

## 2018-09-05 NOTE — PROGRESS NOTES
CC: blurry vision  HPI: No significant changes in vision, no flashes and floaters   Plan for Injection only today (2 out of 3 series)  Follow up in 4 weeks, avastin inj only left eye   Post-op injection instructions given to the patient     ~~~~~~~~~~~~~~~~~~~~~~~~~~~~~~~~~~   Complete documentation of historical and exam elements from today's encounter can be found in the full encounter summary report (not reduplicated in this progress note).  I personally obtained the chief complaint(s) and history of present illness.  I confirmed and edited as necessary the review of systems, past medical/surgical history, family history, social history, and examination findings as documented by others; and I examined the patient myself.  I personally reviewed the relevant tests, images, and reports as documented above.  I formulated and edited as necessary the assessment and plan and discussed the findings and management plan with the patient and family and No resident or fellow assisted with the procedures performed.  I performed the procedures myself.    Enriqueta Martin MD  .  Retina Service   Department of Ophthalmology and Visual Neurosciences   [facility]  Phone: [phone]   Fax: [phone]

## 2018-09-05 NOTE — NURSING NOTE
Chief Complaints and History of Present Illnesses   Patient presents with     Follow Up For     nonproliferative diabetic retinopathy and Diabetic macular edema      HPI    Affected eye(s):  Both   Symptoms:     No blurred vision   No floaters   No flashes      Duration:  1 month   Frequency:  Constant       Do you have eye pain now?:  No      Comments:  Patient presents for 1 month f/u of nonproliferative diabetic retinopathy and Diabetic macular edema with a possible Avastin injection in the LE today. Since the last visit there has not been any changes to the vision. No pain or discomfort pr pt, only for a couple days after the injection. No flashes or floaters. Takes OTC ATS PRN BE. Wears glasses TIEN Clark COT 12:50 PM September 5, 2018

## 2018-09-11 DIAGNOSIS — E11.9 TYPE II DIABETES MELLITUS (H): Primary | ICD-10-CM

## 2018-09-11 NOTE — TELEPHONE ENCOUNTER
PA request received because Acc-Chek Fidelia strips. New RX sent for generic testing strips for patient.    Ayah Paulino RN

## 2018-09-13 ENCOUNTER — OFFICE VISIT (OUTPATIENT)
Dept: ENDOCRINOLOGY | Facility: CLINIC | Age: 53
End: 2018-09-13
Payer: MEDICARE

## 2018-09-13 DIAGNOSIS — L60.2 ONYCHAUXIS: Primary | ICD-10-CM

## 2018-09-13 DIAGNOSIS — B35.3 TINEA PEDIS OF BOTH FEET: ICD-10-CM

## 2018-09-13 DIAGNOSIS — E11.49 TYPE II OR UNSPECIFIED TYPE DIABETES MELLITUS WITH NEUROLOGICAL MANIFESTATIONS, NOT STATED AS UNCONTROLLED(250.60) (H): ICD-10-CM

## 2018-09-13 RX ORDER — ECONAZOLE NITRATE 10 MG/G
CREAM TOPICAL DAILY
Qty: 85 G | Refills: 1 | Status: SHIPPED | OUTPATIENT
Start: 2018-09-13 | End: 2019-01-11

## 2018-09-13 NOTE — PROGRESS NOTES
Past Medical History:   Diagnosis Date     Anemia 2013    Low blood plates current is 37     BPH (benign prostatic hyperplasia)      Cholelithiasis      Conductive hearing loss 8/16/2017    Have a lump on my right side of my face.  Had wax discharge     Depressive disorder 1986    Suffer effects throughout life     Gastroesophageal reflux disease 12/1/2014    Being treated with Prilosac     Hepatitis 2014    Diagnosed with schrosis ESTRADA in 2014.  Suffer from hepatatie     Hyperlipidemia      Liver cirrhosis secondary to ESTRADA (H)      Thrombocytopenia (H)      Type II diabetes mellitus (H)      Patient Active Problem List   Diagnosis     Hepatic cirrhosis (H)     Type II diabetes mellitus (H)     Bipolar affective disorder in remission (H)     Esophageal varices (H)     Nonalcoholic steatohepatitis (ESTRADA)     Parotid abscess     Brow ptosis     Paralytic lagophthalmos of right upper eyelid     Erectile dysfunction due to diseases classified elsewhere     Past Surgical History:   Procedure Laterality Date     ESOPHAGOSCOPY, GASTROSCOPY, DUODENOSCOPY (EGD), COMBINED N/A 11/17/2016    Procedure: COMBINED ESOPHAGOSCOPY, GASTROSCOPY, DUODENOSCOPY (EGD);  Surgeon: Santi Rosas MD;  Location: [facility]     ESOPHAGOSCOPY, GASTROSCOPY, DUODENOSCOPY (EGD), COMBINED N/A 11/17/2017    Procedure: COMBINED ESOPHAGOSCOPY, GASTROSCOPY, DUODENOSCOPY (EGD);  EGD;  Surgeon: Santi Rosas MD;  Location:  GI     HEAD & NECK SURGERY       IMPLANT GOLD WEIGHT EYELID Right 11/16/2017    Procedure: IMPLANT WEIGHT EYELID;  Right Upper Eyelid Weight, right tarsal strip lower eyelid;  Surgeon: Milana Malave MD;  Location: [facility] OR     KNEE SURGERY Left      ORTHOPEDIC SURGERY       PAROTIDECTOMY, RADICAL NECK DISSECTION Right 11/2/2017    Procedure: PAROTIDECTOMY, RADICAL NECK DISSECTION;  Right Superfacial Parotidectomy , Facial nerve repair. with [facility] facial nerve monitor.;  Surgeon: Asiya Morgan MD;  Location:   OR     PICC INSERTION Left 2017    4fr SL BioFlo PICC, 44cm in the L basilic vein w/ tip in the low SVC     VASCULAR SURGERY       Social History     Social History     Marital status:      Spouse name: N/A     Number of children: N/A     Years of education: N/A     Occupational History     Not on file.     Social History Main Topics     Smoking status: Never Smoker     Smokeless tobacco: Former User     Types: Chew     Quit date: 10/31/2017      Comment: 1 tin per week     Alcohol use No      Comment: quit 1996     Drug use: No     Sexual activity: Not Currently     Partners: Female     Birth control/ protection: Condom     Other Topics Concern     Not on file     Social History Narrative     No narrative on file     Family History   Problem Relation Age of Onset     Prostate Cancer Maternal Grandfather      Substance Abuse Maternal Grandfather      Alcohol     Colon Cancer Father 60     Pancreatic Cancer Father 60     Prostate Cancer Father      Colorectal Cancer Father      Macular Degeneration Father      Cancer Father      Glaucoma Father      Colorectal Cancer Maternal Grandmother      Cancer Maternal Grandmother      Substance Abuse Maternal Grandmother      Alcohol     Colorectal Cancer Paternal Grandmother      Cancer Mother      Diabetes Mother       3/2016     Cerebrovascular Disease Mother      Passed away in Feb of this year, 80 years old.     Thyroid Disease Mother      Depression Mother      Asthma Sister      Had since birth     Thyroid Disease Sister      Depression Sister      Liver Disease No family hx of      Lab Results   Component Value Date    A1C 6.6 2018      SUBJECTIVE FINDINGS:  A 53-year-old male who returns to clinic for diabetic foot check.  He relates that he is doing well, foot pain has gotten better, and has peripheral neuropathy.      OBJECTIVE FINDINGS:  DP and PT are 2/4 bilaterally.  He has dorsally contracted digits 2 through 5 bilaterally.  He  has dorsomedial first MPJ prominence and laterally deviated hallux bilaterally.  He has dry scaly skin on toes bilaterally.  There is no erythema, drainage, no odor, no calor bilaterally.  There are no gross tendon voids bilaterally.  He has incurvated nails 1 through 5 bilaterally.         ASSESSMENT AND PLAN:  Onychauxis bilaterally.  Diabetes with peripheral neuropathy.  The plantar fasciitis and foot pain is doing better.  Diagnosis and treatment discussed with him.  Prescription for Econazole cream given and use discussed with him.  He opted for no diabetic shoes or inserts.  He will return to clinic and see me in about 3-4 months.  All the nails were reduced bilaterally upon consent.

## 2018-09-13 NOTE — LETTER
9/13/2018       RE: Frandy Workman  [address] [facility] [address]     Dear Colleague,    Thank you for referring your patient, Frandy Workman, to the [facility] ENDOCRINOLOGY at [facility]. Please see a copy of my visit note below.    Past Medical History:   Diagnosis Date     Anemia 2013    Low blood plates current is 37     BPH (benign prostatic hyperplasia)      Cholelithiasis      Conductive hearing loss 8/16/2017    Have a lump on my right side of my face.  Had wax discharge     Depressive disorder 1986    Suffer effects throughout life     Gastroesophageal reflux disease 12/1/2014    Being treated with Prilosac     Hepatitis 2014    Diagnosed with schrosis ESTRADA in 2014.  Suffer from hepatatie     Hyperlipidemia      Liver cirrhosis secondary to ESTRADA (H)      Thrombocytopenia (H)      Type II diabetes mellitus (H)      Patient Active Problem List   Diagnosis     Hepatic cirrhosis (H)     Type II diabetes mellitus (H)     Bipolar affective disorder in remission (H)     Esophageal varices (H)     Nonalcoholic steatohepatitis (ESTRADA)     Parotid abscess     Brow ptosis     Paralytic lagophthalmos of right upper eyelid     Erectile dysfunction due to diseases classified elsewhere     Past Surgical History:   Procedure Laterality Date     ESOPHAGOSCOPY, GASTROSCOPY, DUODENOSCOPY (EGD), COMBINED N/A 11/17/2016    Procedure: COMBINED ESOPHAGOSCOPY, GASTROSCOPY, DUODENOSCOPY (EGD);  Surgeon: Santi Rosas MD;  Location:  GI     ESOPHAGOSCOPY, GASTROSCOPY, DUODENOSCOPY (EGD), COMBINED N/A 11/17/2017    Procedure: COMBINED ESOPHAGOSCOPY, GASTROSCOPY, DUODENOSCOPY (EGD);  EGD;  Surgeon: Santi Rosas MD;  Location:  GI     HEAD & NECK SURGERY       IMPLANT GOLD WEIGHT EYELID Right 11/16/2017    Procedure: IMPLANT WEIGHT EYELID;  Right Upper Eyelid Weight, right tarsal strip lower eyelid;  Surgeon: Milana Malave MD;  Location: [facility] OR     KNEE SURGERY  Left      ORTHOPEDIC SURGERY       PAROTIDECTOMY, RADICAL NECK DISSECTION Right 2017    Procedure: PAROTIDECTOMY, RADICAL NECK DISSECTION;  Right Superfacial Parotidectomy , Facial nerve repair. with [facility] facial nerve monitor.;  Surgeon: Asiya Morgan MD;  Location: [facility] OR     PICC INSERTION Left 2017    4fr SL BioFlo PICC, 44cm in the L basilic vein w/ tip in the low SVC     VASCULAR SURGERY       Social History     Social History     Marital status:      Spouse name: N/A     Number of children: N/A     Years of education: N/A     Occupational History     Not on file.     Social History Main Topics     Smoking status: Never Smoker     Smokeless tobacco: Former User     Types: Chew     Quit date: 10/31/2017      Comment: 1 tin per week     Alcohol use No      Comment: quit 1996     Drug use: No     Sexual activity: Not Currently     Partners: Female     Birth control/ protection: Condom     Other Topics Concern     Not on file     Social History Narrative     No narrative on file     Family History   Problem Relation Age of Onset     Prostate Cancer Maternal Grandfather      Substance Abuse Maternal Grandfather      Alcohol     Colon Cancer Father 60     Pancreatic Cancer Father 60     Prostate Cancer Father      Colorectal Cancer Father      Macular Degeneration Father      Cancer Father      Glaucoma Father      Colorectal Cancer Maternal Grandmother      Cancer Maternal Grandmother      Substance Abuse Maternal Grandmother      Alcohol     Colorectal Cancer Paternal Grandmother      Cancer Mother      Diabetes Mother       3/2016     Cerebrovascular Disease Mother      Passed away in Feb of this year, 80 years old.     Thyroid Disease Mother      Depression Mother      Asthma Sister      Had since birth     Thyroid Disease Sister      Depression Sister      Liver Disease No family hx of      Lab Results   Component Value Date    A1C 6.6 2018      SUBJECTIVE FINDINGS:  A  53-year-old male who returns to clinic for diabetic foot check.  He relates that he is doing well, foot pain has gotten better, and has peripheral neuropathy.      OBJECTIVE FINDINGS:  DP and PT are 2/4 bilaterally.  He has dorsally contracted digits 2 through 5 bilaterally.  He has dorsomedial first MPJ prominence and laterally deviated hallux bilaterally.   He has dry scaly skin on toes bilaterally.  There is no erythema, drainage, no odor, no calor bilaterally.  There are no gross tendon voids bilaterally.  He has incurvated nails 1 through 5 bilaterally.         ASSESSMENT AND PLAN:  Onychauxis bilaterally.  Diabetes with peripheral neuropathy.  The plantar fasciitis and foot pain is doing better.  Diagnosis and treatment discussed with him.  Prescription for Econazole cream given and use discussed with him.  He opted for no diabetic shoes or inserts.  He will return to clinic and see me in about 3-4 months.  All the nails were reduced bilaterally upon consent.        Again, thank you for allowing me to participate in the care of your patient.      Sincerely,    Lamin Gonzalez DPM

## 2018-09-13 NOTE — MR AVS SNAPSHOT
After Visit Summary   9/13/2018    Frandy Workman    MRN: [id number]           Patient Information     Date Of Birth          1965        Visit Information        Provider Department      9/13/2018 12:45 PM Lamin Gonzalez DPM [facility]        Today's Diagnoses     Onychauxis    -  1    Tinea pedis of both feet        Type II or unspecified type diabetes mellitus with neurological manifestations, not stated as uncontrolled(250.60) (H)           Follow-ups after your visit        Your next 10 appointments already scheduled     Sep 15, 2018 11:20 AM CDT   (Arrive by 11:05 AM)   Return Visit with Nerissa Moe MD   [facility] ([facility])    [address]  4th Floor  [facility] [address] [phone]            Sep 19, 2018 11:20 AM CDT   (Arrive by 11:05 AM)   Procedure with Milana Malave MD,  ENT PROCEDURE ROOM   [facility] ([facility])    [address]  4th Floor  [facility] [id number] [phone]            Oct 03, 2018  1:00 PM CDT   RETURN RETINA with Enriqueta Martin MD   [facility] ([facility])    [address]  [address] [address]  [facility] [address]   [phone]            Oct 15, 2018  1:00 PM CDT   Lab with  LAB   [facility] ([facility])    [address] [place] [address]  1st Floor  [facility] [address] [phone]            Oct 15, 2018  2:00 PM CDT   (Arrive by 1:45 PM)   Return General Liver with Santi Dotson MD   [facility] ([facility])    [address]  [address]  [facility] [address]   [phone]            Grant 10, 2019 12:30 PM CST   (Arrive by 12:15 PM)   Return Visit with Lamin Gonzalez DPM   [facility] ([facility])    [address] [place] [address]  3rd Floor  [facility]  [id number] [phone]            Feb 11, 2019  1:00 PM CST   (Arrive by 12:45 PM)   RETURN ENDOCRINE with Jennifer Wilcox MD   [facility] ([facility])    [address] [id number] [phone]              Who to contact     Please call your clinic at [phone] to:    Ask questions about your health    Make or cancel appointments    Discuss your medicines    Learn about your test results    Speak to your doctor            Additional Information About Your Visit        [employer] Information     [employer] gives you secure access to your electronic health record. If you see a primary care provider, you can also send messages to your care team and make appointments. If you have questions, please call your primary care clinic.  If you do not have a primary care provider, please call [phone] and they will assist you.      [employer] is an electronic gateway that provides easy, online access to your medical records. With [employer], you can request a clinic appointment, read your test results, renew a prescription or communicate with your care team.     To access your existing account, please contact your [facility] or call [phone] for assistance.        Care EveryWhere ID     This is your Care EveryWhere ID. This could be used by other organizations to access your [place] medical records  [id number]         Blood Pressure from Last 3 Encounters:   08/08/18 117/68   04/16/18 115/78   04/11/18 104/70    Weight from Last 3 Encounters:   08/08/18 84.6 kg (186 lb 8 oz)   05/16/18 85.3 kg (188 lb)   04/16/18 83.8 kg (184 lb 12.8 oz)              We Performed the Following     TRIM NONDYSTRPHIC NAIL(S)          Today's Medication Changes          These changes are accurate as of 9/13/18 12:57 PM.  If you have any questions, ask your nurse or doctor.               Start taking these medicines.         Dose/Directions    econazole nitrate 1 % cream   Used for:  Tinea pedis of both feet        Apply topically daily To feet and toenails.   Quantity:  85 g   Refills:  1         These medicines have changed or have updated prescriptions.        Dose/Directions    lactulose 10 GM/15ML solution   Commonly known as:  CHRONULAC   This may have changed:    - how much to take  - additional instructions   Used for:  Liver cirrhosis secondary to ESTRDAA (H)        Dose:  30 g   Take 45 mLs (30 g) by mouth 4 times daily   Quantity:  7200 mL   Refills:  11            Where to get your medicines      These medications were sent to [facility] [address] [place] [address] [address] [facility] AT [facility]  [address] [facility] [address]     Phone:  [phone]     econazole nitrate 1 % cream                Primary Care Provider Office Phone # Fax #    Natalie Mitzi Russell [phone]       [address] [place] [address]  [facility] [address]        Equal Access to Services     MAYCOL RODRIGUEZ AH: Hadii curly ku hadasho Soomaali, waaxda luqadaha, qaybta kaalmada adeegyada, waxay wiliamin haybrigitte mcdonald . So [facility] [phone].    ATENCIÓN: Si habla español, tiene a hanley disposición servicios gratuitos de asistencia lingüística. [facility] [phone].    We comply with applicable federal civil rights laws and [place] laws. We do not discriminate on the basis of race, color, national origin, age, disability, sex, sexual orientation, or gender identity.            Thank you!     Thank you for choosing [facility]  for your care. Our goal is always to provide you with excellent care. Hearing back from our patients is one way we can continue to improve our services. Please take a few minutes to complete the written survey that you may receive in the mail after your visit with us. Thank you!             Your Updated Medication List - Protect others around you: Learn how to safely use,  store and throw away your medicines at www.disposemymeds.org.          This list is accurate as of 9/13/18 12:57 PM.  Always use your most recent med list.                   Brand Name Dispense Instructions for use Diagnosis    aspirin 81 MG EC tablet    ASPIRIN LOW DOSE    90 tablet    Take 1 tablet (81 mg) by mouth daily    Type 2 diabetes mellitus without complication, with long-term current use of insulin (H)       BD VIKTORIA U/F 32G X 4 MM   Generic drug:  insulin pen needle     300 each    Use 5 per day    Type 2 diabetes mellitus without complication, with long-term current use of insulin (H)       blood glucose monitoring lancets     408 each    Use to test blood sugar 4 times daily or as directed.  1 box = 102 lancets    Type II diabetes mellitus (H)       * blood glucose monitoring test strip    ACCU-CHEK EDINSON PLUS    400 each    Use to test blood sugar 4 times daily    Type II diabetes mellitus (H)       * blood glucose monitoring test strip    no brand specified    100 strip    Use to test blood sugars 4 times daily or as directed    Type II diabetes mellitus (H)       capsaicin 0.025 % Crea cream    ZOSTRIX    60 g    To feet 2-3 times daily as needed.    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    Tinea pedis of both feet       erythromycin ophthalmic ointment    ROMYCIN    1 Tube    Place into the right eye 3 times daily    Post-operative state        insulin degludec 200 UNIT/ML pen    TRESIBA    100 mL    Take 100 units daily.    Type 2 diabetes mellitus with hyperglycemia, with long-term current use of insulin (H)       lactulose 10 GM/15ML solution    CHRONULAC    7200 mL    Take 45 mLs (30 g) by mouth 4 times daily    Liver cirrhosis secondary to ESTRADA (H)       NovoLOG FLEXPEN 100 UNIT/ML injection   Generic drug:  insulin aspart     30 mL    INJECT 1 UNIT PER 4 GRAMS OF CARBS AT MEALS AND SNACKS CORRECTION SCALE OF 1 UNITS PER 25 OVER [address] DOSE 75UNITS PER DAY    Type II diabetes mellitus (H)       OLANZapine 2.5 MG tablet    zyPREXA    30 tablet    Take 1 tablet (2.5 mg) by mouth At Bedtime    Insomnia, unspecified type       * omeprazole 40 MG capsule    priLOSEC          * omeprazole 40 MG capsule    priLOSEC    90 capsule    Take 1 capsule (40 mg) by mouth daily    Gastroesophageal reflux disease, esophagitis presence not specified       potassium chloride SA 20 MEQ CR tablet    K-DUR/KLOR-CON M    90 tablet    Take 1 tablet (20 mEq) by mouth daily    Hypokalemia       pravastatin 20 MG tablet    PRAVACHOL    90 tablet    Take 1 tablet (20 mg) by mouth daily    Hyperlipidemia LDL goal <100       Propylene Glycol-Glycerin 1-0.3 % Soln    ARTIFICIAL TEARS    30 mL    Apply 1 drop to eye every 2 hours (while awake)    Post-operative state       RA VITAMIN B-12 TR 1000 MCG Tbcr   Generic drug:  cyanocobalamin      Take 1,000 mcg by mouth every morning        REFRESH LACRI-LUBE Oint     1 Tube    Apply 1 Application to eye At Bedtime    Post-operative state       rifaximin 550 MG Tabs tablet    XIFAXAN    60 tablet    Take 1 tablet (550 mg) by mouth 2 times daily    Hepatic encephalopathy (H)       sildenafil 20 MG tablet    REVATIO    90 tablet    Take 2-3  Tablets before activity by mouth    ED (erectile dysfunction)       SM ARTIFICIAL TEARS Soln     1 Bottle    Place 1 drop into the right eye every hour as needed Apply at least 4 times daily  and as needed for dry eye    Dry eyes       tadalafil 20 MG tablet    CIALIS    30 tablet    Take 1 tablet (20 mg) by mouth daily as needed    Erectile dysfunction, unspecified erectile dysfunction type       * tamsulosin 0.4 MG capsule    FLOMAX          * tamsulosin 0.4 MG capsule    FLOMAX    90 capsule    Take 1 capsule (0.4 mg) by mouth daily    Benign prostatic hyperplasia with lower urinary tract symptoms       THERAVITE PO      Take 1 tablet by mouth every morning        UNABLE TO FIND      2 times daily ULTRA MALE ENHANCEMENT POTENCY        VITAMIN D-3 PO      Take 2,000 Units by mouth every morning        * Notice:  This list has 6 medication(s) that are the same as other medications prescribed for you. Read the directions carefully, and ask your doctor or other care provider to review them with you.

## 2018-09-15 ENCOUNTER — OFFICE VISIT (OUTPATIENT)
Dept: INTERNAL MEDICINE | Facility: CLINIC | Age: 53
End: 2018-09-15
Payer: MEDICARE

## 2018-09-15 VITALS — WEIGHT: 191.3 LBS | BODY MASS INDEX: 28.24 KG/M2 | SYSTOLIC BLOOD PRESSURE: 110 MMHG | DIASTOLIC BLOOD PRESSURE: 66 MMHG

## 2018-09-15 DIAGNOSIS — Z23 NEED FOR VACCINATION: ICD-10-CM

## 2018-09-15 DIAGNOSIS — K75.81 NONALCOHOLIC STEATOHEPATITIS (NASH): ICD-10-CM

## 2018-09-15 DIAGNOSIS — D64.9 CHRONIC ANEMIA: Primary | ICD-10-CM

## 2018-09-15 DIAGNOSIS — D64.9 CHRONIC ANEMIA: ICD-10-CM

## 2018-09-15 DIAGNOSIS — R21 SKIN RASH: ICD-10-CM

## 2018-09-15 DIAGNOSIS — R53.82 CHRONIC FATIGUE: ICD-10-CM

## 2018-09-15 DIAGNOSIS — G47.00 INSOMNIA, UNSPECIFIED TYPE: ICD-10-CM

## 2018-09-15 DIAGNOSIS — Z00.00 HEALTH CARE MAINTENANCE: ICD-10-CM

## 2018-09-15 DIAGNOSIS — E11.3293 TYPE 2 DIABETES MELLITUS WITH MILD NONPROLIFERATIVE RETINOPATHY OF BOTH EYES WITHOUT MACULAR EDEMA, UNSPECIFIED LONG TERM INSULIN USE STATUS: ICD-10-CM

## 2018-09-15 LAB
FERRITIN SERPL-MCNC: 10 NG/ML (ref 26–388)
FOLATE SERPL-MCNC: 29.1 NG/ML
IRON SATN MFR SERPL: 13 % (ref 15–46)
IRON SERPL-MCNC: 52 UG/DL (ref 35–180)
TIBC SERPL-MCNC: 403 UG/DL (ref 240–430)
VIT B12 SERPL-MCNC: 1242 PG/ML (ref 193–986)

## 2018-09-15 PROCEDURE — 82746 ASSAY OF FOLIC ACID SERUM: CPT | Performed by: INTERNAL MEDICINE

## 2018-09-15 ASSESSMENT — PAIN SCALES - GENERAL: PAINLEVEL: MODERATE PAIN (5)

## 2018-09-15 NOTE — MR AVS SNAPSHOT
"              After Visit Summary   9/15/2018    Frandy Workman    MRN: [id number]           Patient Information     Date Of Birth          1965        Visit Information        Provider Department      9/15/2018 11:20 AM Nerissa Moe MD [facility]        Today's Diagnoses     Chronic anemia    -  1    Need for vaccination        Nonalcoholic steatohepatitis (ESTRADA)          Care Instructions    Primary Care Center Medication Refill Request Information:  * Please contact your pharmacy regarding ANY request for medication refills.  ** [facility] Prescription Fax = [phone]  * Please allow 3 business days for routine medication refills.  * Please allow 5 business days for controlled substance medication refills.     Primary Care Center Test Result notification information:  *You will be notified with in 7-10 days of your appointment day regarding the results of your test.  If you are on MyChart you will be notified as soon as the provider has reviewed the results and signed off on them.    [facility]: [phone]       Here are some tips for improving sleep without medication:  1.  Do not drink products with caffeine (coffee, tea, soda, etc.) except for one cup of coffee in the morning is okay for now.  2.  Exercise daily, and avoid exercising in the late evening.  3.  Avoid having heavy meals towards bed time  4.  Make sure you minimize any noises, inside or outside your bedroom, which might disturb your sleep. Try wearing earplugs to block out any noise  5.  Use your bedroom/bed for sleep and intimate relations only. Keep the temperature cool  6.  If you are awake for any more than 15-20 minutes, get out of bed and your bedroom and do something relaxing that would promote drowsiness (e.g. reading).  Return to your bedroom when you are drowsy.  7.  Do not watch TV, work on the computer, check your cell phone, play video games or use any other \"screen\" at night.  8.  If you have to " use lights at night, keep them as dim as possible.  9.  Dim the lights in the evening and use low light if you have to wake up at night.  10.  Turn alarm clocks around.  Keep clocks outside your bedroom if possible.  11.  Limit your alcohol intake (beer, wine, hard liquor) to one serving a day and do not drink it at bedtime.  12.  Try some relaxing activites at night including sipping on chamomile tea, reading, taking a bath.  13.  Keep your room on the cool side when sleeping              Follow-ups after your visit        Your next 10 appointments already scheduled     Sep 15, 2018 12:00 PM CDT   LAB with  LAB   [facility] ([facility])    [address] [place] [address]  1st [facility] [id number] [phone]           Please do not eat 10-12 hours before your appointment if you are coming in fasting for labs on lipids, cholesterol, or glucose (sugar). This does not apply to pregnant women. Water, hot tea and black coffee (with nothing added) are okay. Do not drink other fluids, diet soda or chew gum.            Sep 19, 2018 11:20 AM CDT   (Arrive by 11:05 AM)   Procedure with Milana Malave MD,  ENT PROCEDURE ROOM   [facility] ([facility])    [address] [place] [id number] [phone]            Oct 03, 2018  1:00 PM CDT   RETURN RETINA with Enriqueta Martin MD   Eye Clinic ([facility])    [address] Clin [address]  [facility] [address]   [phone]            Oct 15, 2018  1:00 PM CDT   Lab with  LAB    [facility] ([facility])    [address] [place] [id number] [phone]            Oct 15, 2018  2:00 PM CDT   (Arrive by 1:45 PM)   Return General Liver with Santi Dotson MD   [facility] ([facility])    [address] [place] [address]  [address]  [facility] [address]   [phone]            Grant 10, 2019 12:30 PM CST   (Arrive by 12:15 PM)   Return Visit with Lamin Gonzalez DPM   [facility] End[facility] ([facility])    [address] [place] [id number] [phone]            Feb 11, 2019  1:00 PM CST   (Arrive by 12:45 PM)   RETURN ENDOCRINE with Jennifer Wilcox MD   [facility] End[facility] ([facility])    [address] [place] [address] [phone]              Future tests that were ordered for you today     Open Future Orders        Priority Expected Expires Ordered    Iron and iron binding capacity Routine 9/15/2018 9/15/2019 9/15/2018    HC ZOSTER VACCINE RECOMBINANT ADJUVANTED IM NJX Routine  9/15/2019 9/15/2018    Vitamin B12 Routine 9/15/2018 9/15/2019 9/15/2018    Folate Routine 9/15/2018 9/15/2019 9/15/2018    Ferritin Routine 9/15/2018 9/15/2019 9/15/2018            Who to contact     Please call your clinic at [phone] to:    Ask questions about your health    Make or cancel appointments    Discuss your medicines    Learn about your test results    Speak to your doctor            Additional Information About Your Visit        [employer] Information     [employer] gives you secure access to your electronic health record. If you see a primary care provider, you can also send messages to your care team and make appointments. If you have questions, please call your primary care clinic.  If you do not have a primary care provider, please call [phone] and they will assist you.      [employer] is an electronic gateway that provides easy, online access to your medical records. With [employer], you can request a clinic appointment, read your test results, renew a prescription or communicate with your care team.     To access your existing account, please contact your [facility] or call [phone]  for assistance.        Care EveryWhere ID     This is your Care EveryWhere ID. This could be used by other organizations to access your [place] medical records  [id number]        Your Vitals Were     BMI (Body Mass Index)                   28.24 kg/m2            Blood Pressure from Last 3 Encounters:   09/15/18 110/66   08/08/18 117/68   04/16/18 115/78    Weight from Last 3 Encounters:   09/15/18 86.8 kg (191 lb 4.8 oz)   08/08/18 84.6 kg (186 lb 8 oz)   05/16/18 85.3 kg (188 lb)                 Today's Medication Changes          These changes are accurate as of 9/15/18 11:58 AM.  If you have any questions, ask your nurse or doctor.               These medicines have changed or have updated prescriptions.        Dose/Directions    lactulose 10 GM/15ML solution   Commonly known as:  CHRONULAC   This may have changed:    - how much to take  - additional instructions   Used for:  Liver cirrhosis secondary to ESTRADA (H)        Dose:  30 g   Take 45 mLs (30 g) by mouth 4 times daily   Quantity:  7200 mL   Refills:  11                Primary Care Provider Office Phone # Fax #    Natalie Mitzi Russell [phone]       [address] [place] [address] [address] [place] [id number]        Equal Access to Services     MINDI RODRIGUEZ AH: Amelia huntero Somichael, waaxda luqadaha, qaybta kaalmada adeegyada, emy vuong. So [facility] [phone].    ATENCIÓN: Si habla español, tiene a hanley disposición servicios gratuitos de asistencia lingüística. Llame al [phone].    We comply with applicable federal civil rights laws and [place] laws. We do not discriminate on the basis of race, color, national origin, age, disability, sex, sexual orientation, or gender identity.            Thank you!     Thank you for choosing [facility]  for your care. Our goal is always to provide you with excellent care. Hearing back from our patients is one way we can continue to improve our  services. Please take a few minutes to complete the written survey that you may receive in the mail after your visit with us. Thank you!             Your Updated Medication List - Protect others around you: Learn how to safely use, store and throw away your medicines at www.disposemymeds.org.          This list is accurate as of 9/15/18 11:58 AM.  Always use your most recent med list.                   Brand Name Dispense Instructions for use Diagnosis    aspirin 81 MG EC tablet    ASPIRIN LOW DOSE    90 tablet    Take 1 tablet (81 mg) by mouth daily    Type 2 diabetes mellitus without complication, with long-term current use of insulin (H)       BD VIKTORIA U/F 32G X 4 MM   Generic drug:  insulin pen needle     300 each    Use 5 per day    Type 2 diabetes mellitus without complication, with long-term current use of insulin (H)       blood glucose monitoring lancets     408 each    Use to test blood sugar 4 times daily or as directed.  1 box = 102 lancets    Type II diabetes mellitus (H)       * blood glucose monitoring test strip    ACCU-CHEK EDINSON PLUS    400 each    Use to test blood sugar 4 times daily    Type II diabetes mellitus (H)       * blood glucose monitoring test strip    no brand specified    100 strip    Use to test blood sugars 4 times daily or as directed    Type II diabetes mellitus (H)       capsaicin 0.025 % Crea cream    ZOSTRIX    60 g    To feet 2-3 times daily as needed.    Type II or unspecified type diabetes mellitus with neurological manifestations, not stated as uncontrolled(250.60) (H)       carvedilol 12.5 MG tablet    COREG    180 tablet    Take 1 tablet (12.5 mg) by mouth 2 times daily (with meals)    Liver cirrhosis secondary to ESTRADA (H), Secondary esophageal varices without bleeding (H)       dapagliflozin 10 MG Tabs tablet    FARXIGA    90 tablet    Take 1 tablet (10 mg) by mouth daily    Type 2 diabetes mellitus with hyperglycemia, with long-term current use of insulin (H)        econazole nitrate 1 % cream     85 g    Apply topically daily To feet and toenails.    Tinea pedis of both feet       erythromycin ophthalmic ointment    ROMYCIN    1 Tube    Place into the right eye 3 times daily    Post-operative state       insulin degludec 200 UNIT/ML pen    TRESIBA    100 mL    Take 100 units daily.    Type 2 diabetes mellitus with hyperglycemia, with long-term current use of insulin (H)       lactulose 10 GM/15ML solution    CHRONULAC    7200 mL    Take 45 mLs (30 g) by mouth 4 times daily    Liver cirrhosis secondary to ESTRADA (H)       NovoLOG FLEXPEN 100 UNIT/ML injection   Generic drug:  insulin aspart     30 mL    INJECT 1 UNIT PER 4 GRAMS OF CARBS AT MEALS AND SNACKS CORRECTION SCALE OF 1 UNITS PER 25 OVER [address] DOSE 75UNITS PER DAY    Type II diabetes mellitus (H)       OLANZapine 2.5 MG tablet    zyPREXA    30 tablet    Take 1 tablet (2.5 mg) by mouth At Bedtime    Insomnia, unspecified type       * omeprazole 40 MG capsule    priLOSEC          * omeprazole 40 MG capsule    priLOSEC    90 capsule    Take 1 capsule (40 mg) by mouth daily    Gastroesophageal reflux disease, esophagitis presence not specified       potassium chloride SA 20 MEQ CR tablet    K-DUR/KLOR-CON M    90 tablet    Take 1 tablet (20 mEq) by mouth daily    Hypokalemia       pravastatin 20 MG tablet    PRAVACHOL    90 tablet    Take 1 tablet (20 mg) by mouth daily    Hyperlipidemia LDL goal <100       Propylene Glycol-Glycerin 1-0.3 % Soln    ARTIFICIAL TEARS    30 mL    Apply 1 drop to eye every 2 hours (while awake)    Post-operative state       RA VITAMIN B-12 TR 1000 MCG Tbcr   Generic drug:  cyanocobalamin      Take 1,000 mcg by mouth every morning        REFRESH LACRI-LUBE Oint     1 Tube    Apply 1 Application to eye At Bedtime    Post-operative state       rifaximin 550 MG Tabs tablet    XIFAXAN    60 tablet    Take 1 tablet (550 mg) by mouth 2 times daily    Hepatic encephalopathy (H)       sildenafil 20 MG  tablet    REVATIO    90 tablet    Take 2-3  Tablets before activity by mouth    ED (erectile dysfunction)       SM ARTIFICIAL TEARS Soln     1 Bottle    Place 1 drop into the right eye every hour as needed Apply at least 4 times daily and as needed for dry eye    Dry eyes       tadalafil 20 MG tablet    CIALIS    30 tablet    Take 1 tablet (20 mg) by mouth daily as needed    Erectile dysfunction, unspecified erectile dysfunction type       * tamsulosin 0.4 MG capsule    FLOMAX          * tamsulosin 0.4 MG capsule    FLOMAX    90 capsule    Take 1 capsule (0.4 mg) by mouth daily    Benign prostatic hyperplasia with lower urinary tract symptoms       THERAVITE PO      Take 1 tablet by mouth every morning        UNABLE TO FIND      2 times daily ULTRA MALE ENHANCEMENT POTENCY        VITAMIN D-3 PO      Take 2,000 Units by mouth every morning        * Notice:  This list has 6 medication(s) that are the same as other medications prescribed for you. Read the directions carefully, and ask your doctor or other care provider to review them with you.

## 2018-09-15 NOTE — PATIENT INSTRUCTIONS
"[facility] Medication Refill Request Information:  * Please contact your pharmacy regarding ANY request for medication refills.  ** [facility] Fax = [phone]  * Please allow 3 business days for routine medication refills.  * Please allow 5 business days for controlled substance medication refills.     [facility] Test Result notification information:  *You will be notified with in 7-10 days of your appointment day regarding the results of your test.  If you are on MyChart you will be notified as soon as the provider has reviewed the results and signed off on them.    [facility]: [phone]       Here are some tips for improving sleep without medication:  1.  Do not drink products with caffeine (coffee, tea, soda, etc.) except for one cup of coffee in the morning is okay for now.  2.  Exercise daily, and avoid exercising in the late evening.  3.  Avoid having heavy meals towards bed time  4.  Make sure you minimize any noises, inside or outside your bedroom, which might disturb your sleep. Try wearing earplugs to block out any noise  5.  Use your bedroom/bed for sleep and intimate relations only. Keep the temperature cool  6.  If you are awake for any more than 15-20 minutes, get out of bed and your bedroom and do something relaxing that would promote drowsiness (e.g. reading).  Return to your bedroom when you are drowsy.  7.  Do not watch TV, work on the computer, check your cell phone, play video games or use any other \"screen\" at night.  8.  If you have to use lights at night, keep them as dim as possible.  9.  Dim the lights in the evening and use low light if you have to wake up at night.  10.  Turn alarm clocks around.  Keep clocks outside your bedroom if possible.  11.  Limit your alcohol intake (beer, wine, hard liquor) to one serving a day and do not drink it at bedtime.  12.  Try some relaxing activites at night including sipping on chamomile tea, reading, taking a " bath.  13.  Keep your room on the cool side when sleeping

## 2018-09-15 NOTE — NURSING NOTE
Chief Complaint   Patient presents with     Refill Request     follow up, meds review per pt      Xiao Hernández at 11:13 AM on 9/15/2018.

## 2018-09-15 NOTE — PROGRESS NOTES
CC:  Here for a routine f/u internal medicine visit.  S:  PCP on maternity leave currently  Reviewed routine HCM, had flu shot last week, we discussed Shingrix, colonoscopy up to date.  Reviewed general medical history, reviewed specialists notes since Dr. Bowen's last visit and pertinent imaging and lab tests (see below)  Chronic sleep problems, but not interested in consultation at this time.  No sleep eating, sleep talking history.  No R/NR BH symptoms, no restless legs.  Tends to keep environment warm.  There is construction noise at odd hours in his apartment building.  Drinks up to 3 caffeinated beverages per day, none past 3 pm. Does not have a bed partner, but is not aware of any snoring or apnea symptoms.  Discussed sleep hygeine  Chronic lethargy  He does have chronic anemia and we reviewed prior B12, folate, thyroid results.  He does take supplements.  Has diabetes, last A1C under control  Has peripheral neuropathy which bothers him at night.  Dr. Gonzalez recently prescribed capsaicin which he will start soon.      Patient Active Problem List   Diagnosis     Hepatic cirrhosis (H)     Type II diabetes mellitus (H)     Bipolar affective disorder in remission (H)     Esophageal varices (H)     Nonalcoholic steatohepatitis (ESTRADA)     Parotid abscess     Brow ptosis     Paralytic lagophthalmos of right upper eyelid     Erectile dysfunction due to diseases classified elsewhere     Past Medical History:   Diagnosis Date     Anemia 2013    Low blood plates current is 37     BPH (benign prostatic hyperplasia)      Cholelithiasis      Conductive hearing loss 8/16/2017    Have a lump on my right side of my face.  Had wax discharge     Depressive disorder 1986    Suffer effects throughout life     Gastroesophageal reflux disease 12/1/2014    Being treated with Prilosac     Hepatitis 2014    Diagnosed with schrosis ESTRADA in 2014.  Suffer from hepatatie     Hyperlipidemia      Liver cirrhosis secondary to ESTRADA (H)       Thrombocytopenia (H)      Type II diabetes mellitus (H)      Past Surgical History:   Procedure Laterality Date     ESOPHAGOSCOPY, GASTROSCOPY, DUODENOSCOPY (EGD), COMBINED N/A 2016    Procedure: COMBINED ESOPHAGOSCOPY, GASTROSCOPY, DUODENOSCOPY (EGD);  Surgeon: Santi Rosas MD;  Location:  GI     ESOPHAGOSCOPY, GASTROSCOPY, DUODENOSCOPY (EGD), COMBINED N/A 2017    Procedure: COMBINED ESOPHAGOSCOPY, GASTROSCOPY, DUODENOSCOPY (EGD);  EGD;  Surgeon: Santi Rosas MD;  Location:  GI     HEAD & NECK SURGERY       IMPLANT GOLD WEIGHT EYELID Right 2017    Procedure: IMPLANT WEIGHT EYELID;  Right Upper Eyelid Weight, right tarsal strip lower eyelid;  Surgeon: Milana Malave MD;  Location: [facility] OR     KNEE SURGERY Left      ORTHOPEDIC SURGERY       PAROTIDECTOMY, RADICAL NECK DISSECTION Right 2017    Procedure: PAROTIDECTOMY, RADICAL NECK DISSECTION;  Right Superfacial Parotidectomy , Facial nerve repair. with [facility] facial nerve monitor.;  Surgeon: Asiya Morgan MD;  Location: [facility] OR     PICC INSERTION Left 2017    4fr SL BioFlo PICC, 44cm in the L basilic vein w/ tip in the low SVC     VASCULAR SURGERY       Family History   Problem Relation Age of Onset     Prostate Cancer Maternal Grandfather      Substance Abuse Maternal Grandfather      Alcohol     Colon Cancer Father 60     Pancreatic Cancer Father 60     Prostate Cancer Father      Colorectal Cancer Father      Macular Degeneration Father      Cancer Father      Glaucoma Father      Colorectal Cancer Maternal Grandmother      Cancer Maternal Grandmother      Substance Abuse Maternal Grandmother      Alcohol     Colorectal Cancer Paternal Grandmother      Cancer Mother      Diabetes Mother       3/2016     Cerebrovascular Disease Mother      Passed away in Feb of this year, 80 years old.     Thyroid Disease Mother      Depression Mother      Asthma Sister      Had since birth     Thyroid Disease  Sister      Depression Sister      Liver Disease No family hx of      Social History     Social History     Marital status:      Spouse name: N/A     Number of children: N/A     Years of education: N/A     Occupational History     Not on file.     Social History Main Topics     Smoking status: Never Smoker     Smokeless tobacco: Former User     Types: Chew     Quit date: 10/31/2017      Comment: 1 tin per week     Alcohol use No      Comment: quit Sept. 1996     Drug use: No     Sexual activity: Not Currently     Partners: Female     Birth control/ protection: Condom     Other Topics Concern     Not on file     Social History Narrative     Current Outpatient Prescriptions   Medication Sig Dispense Refill     Artificial Tear Ointment (REFRESH LACRI-LUBE) OINT Apply 1 Application to eye At Bedtime 1 Tube 3     Artificial Tear Solution (SM ARTIFICIAL TEARS) SOLN Place 1 drop into the right eye every hour as needed Apply at least 4 times daily and as needed for dry eye 1 Bottle 3     aspirin (ASPIRIN LOW DOSE) 81 MG EC tablet Take 1 tablet (81 mg) by mouth daily 90 tablet 2     BD VIKTORIA U/F 32G X 4 MM insulin pen needle Use 5 per day 300 each 3     blood glucose monitoring (ACCU-CHEK EDINSON PLUS) test strip Use to test blood sugar 4 times daily 400 each 3     blood glucose monitoring (ACCU-CHEK FASTCLIX) lancets Use to test blood sugar 4 times daily or as directed.  1 box = 102 lancets 408 each 3     blood glucose monitoring (NO BRAND SPECIFIED) test strip Use to test blood sugars 4 times daily or as directed 100 strip 1     capsaicin (ZOSTRIX) 0.025 % CREA cream To feet 2-3 times daily as needed. 60 g 6     carvedilol (COREG) 12.5 MG tablet Take 1 tablet (12.5 mg) by mouth 2 times daily (with meals) 180 tablet 3     Cholecalciferol (VITAMIN D-3 PO) Take 2,000 Units by mouth every morning        cyanocobalamin (RA VITAMIN B-12 TR) 1000 MCG TBCR Take 1,000 mcg by mouth every morning        dapagliflozin (FARXIGA) 10  MG TABS tablet Take 1 tablet (10 mg) by mouth daily 90 tablet 3     econazole nitrate 1 % cream Apply topically daily To feet and toenails. 85 g 1     erythromycin (ROMYCIN) ophthalmic ointment Place into the right eye 3 times daily 1 Tube 1     insulin degludec (TRESIBA) 200 UNIT/ML pen Take 100 units daily. 100 mL 3     lactulose (CHRONULAC) 10 GM/15ML solution Take 45 mLs (30 g) by mouth 4 times daily (Patient taking differently: Take 60 g by mouth 4 times daily 2-6 TIMES A DAY) 7200 mL 11     Multiple Vitamin (THERAVITE PO) Take 1 tablet by mouth every morning        NOVOLOG FLEXPEN 100 UNIT/ML soln INJECT 1 UNIT PER 4 GRAMS OF CARBS AT MEALS AND SNACKS CORRECTION SCALE OF 1 UNITS PER 25 OVER [address] DOSE 75UNITS PER DAY 30 mL 4     OLANZapine (ZYPREXA) 2.5 MG tablet Take 1 tablet (2.5 mg) by mouth At Bedtime 30 tablet 0     omeprazole (PRILOSEC) 40 MG capsule Take 1 capsule (40 mg) by mouth daily 90 capsule 2     omeprazole (PRILOSEC) 40 MG capsule   2     potassium chloride SA (K-DUR/KLOR-CON M) 20 MEQ CR tablet Take 1 tablet (20 mEq) by mouth daily 90 tablet 2     pravastatin (PRAVACHOL) 20 MG tablet Take 1 tablet (20 mg) by mouth daily 90 tablet 3     Propylene Glycol-Glycerin (ARTIFICIAL TEARS) 1-0.3 % SOLN Apply 1 drop to eye every 2 hours (while awake) 30 mL 11     rifaximin (XIFAXAN) 550 MG TABS tablet Take 1 tablet (550 mg) by mouth 2 times daily 60 tablet 11     sildenafil (REVATIO) 20 MG tablet Take 2-3  Tablets before activity by mouth 90 tablet 3     tadalafil (CIALIS) 20 MG tablet Take 1 tablet (20 mg) by mouth daily as needed 30 tablet 0     tamsulosin (FLOMAX) 0.4 MG capsule Take 1 capsule (0.4 mg) by mouth daily 90 capsule 3     tamsulosin (FLOMAX) 0.4 MG capsule   2     UNABLE TO FIND 2 times daily ULTRA MALE ENHANCEMENT POTENCY       Allergies   Allergen Reactions     Codeine Other (See Comments)     Cannot take due to liver  Cannot tolerate oral narcotics     /66 (BP Location: Right  arm, Patient Position: Sitting)  Wt 86.8 kg (191 lb 4.8 oz)  BMI 28.24 kg/m2  Physical Examination:  General:  Pleasant, alert, no acute distress  Eyes:  Pupils 2-3 mm, sclera white, EOM's full.    Lungs:  Clear to auscultation throughout, no wheezes, rhonchi or rales.  C/V:  Regular rate and rhythm, no murmurs, rubs or gallops.  No JVD,No peripheral edema.   Abdomen:  Soft, no tense distention.  Bowel sounds active.  No tenderness, I could not appreciate the size of his liver nor spleen today.  No CVA tenderness or masses.'  Neuro:  Alert and oriented, face symmetric. No tremor..    Skin:   No jaundice.  Normal moisture, good skin turgor. Half-dollar sized tan, well circumscribed, round patch with slight serpiginous border, possible c/w tinea right upper arm.  Psych:  Broad range affect.  Not psychomotor slowed.  No signs of anxiety or agitation.      Component      Latest Ref Rng & Units 4/25/2018 8/8/2018 8/8/2018 8/8/2018           12:46 PM 12:46 PM 12:54 PM   Sodium      133 - 144 mmol/L 141      Potassium      3.4 - 5.3 mmol/L 4.1      Chloride      94 - 109 mmol/L 111 (H)      Carbon Dioxide      20 - 32 mmol/L 24      Anion Gap      3 - 14 mmol/L 6      Glucose      70 - 99 mg/dL 116 (H)      Urea Nitrogen      7 - 30 mg/dL 21      Creatinine      0.66 - 1.25 mg/dL 1.21      GFR Estimate      >60 mL/min/1.7m2 62      GFR Estimate If Black      >60 mL/min/1.7m2 76      Calcium      8.5 - 10.1 mg/dL 9.0      WBC      4.0 - 11.0 10e9/L   2.2 (L)    RBC Count      4.4 - 5.9 10e12/L   3.12 (L)    Hemoglobin      13.3 - 17.7 g/dL   9.0 (L)    Hematocrit      40.0 - 53.0 %  30.1 (L) 30.1 (L)    MCV      78 - 100 fl   97    MCH      26.5 - 33.0 pg   28.8    MCHC      31.5 - 36.5 g/dL   29.9 (L)    RDW      10.0 - 15.0 %   15.9 (H)    Platelet Count      150 - 450 10e9/L   50 (L)    Cholesterol      <200 mg/dL  133     Triglycerides      <150 mg/dL  172 (H)     HDL Cholesterol      >39 mg/dL  30 (L)     LDL  Cholesterol Calculated      <100 mg/dL  68     Non HDL Cholesterol      <130 mg/dL  103     Creatinine Urine      mg/dL    107   Albumin Urine mg/L      mg/L    7   Albumin Urine mg/g Cr      0 - 17 mg/g Cr    6.48   TSH      0.40 - 4.00 mU/L  0.49     T4 Free      0.76 - 1.46 ng/dL  1.21     ALT      0 - 70 U/L  31     CK Total      30 - 300 U/L  67     Hemoglobin A1C      0 - 5.6 %  6.6 (H)       Component      Latest Ref Rng & Units 9/27/2017 10/16/2017 11/2/2017 11/2/2017             6:21 AM 10:20 AM   Hemoglobin      13.3 - 17.7 g/dL 10.5 (L) 11.4 (L) 11.7 (L) 11.3 (L)     Component      Latest Ref Rng & Units 11/2/2017 11/3/2017 11/4/2017 11/5/2017           4:56 PM      Hemoglobin      13.3 - 17.7 g/dL 10.1 (L) 9.6 (L) 10.5 (L) 9.8 (L)     Component      Latest Ref Rng & Units 11/6/2017 12/8/2017 4/16/2018 8/8/2018             12:46 PM   Hemoglobin      13.3 - 17.7 g/dL 9.6 (L) 11.1 (L) 12.0 (L)      Component      Latest Ref Rng & Units 8/8/2018          12:46 PM   Hemoglobin      13.3 - 17.7 g/dL 9.0 (L)     EXAMINATION: US ABDOMEN COMPLETE,  6/20/2018 12:00 PM      COMPARISON: 12/13/2017 ultrasound     HISTORY: History of cirrhosis     TECHNIQUE: The abdomen was scanned in standard fashion with  specialized ultrasound transducer(s) using both gray-scale and limited  color Doppler techniques.     Findings:  Liver: Nodular surface contour to the liver with coarsened and  heterogeneous hepatic echotexture consistent with cirrhosis. No focal  hepatic mass. The main portal vein is patent with antegrade flow,  measuring 1.0 cm.     Gallbladder:  There is no wall thickening, pericholecystic fluid,  positive sonographic Hyman's sign or evidence for cholelithiasis.     Bile Ducts: Both the intra- and extrahepatic biliary system are of  normal caliber.  The common bile duct measures 3.7 mm in diameter.     Pancreas: Visualized portions of the head and body of the pancreas are  unremarkable.      Kidneys: Both  kidneys are of normal echotexture, without mass or  hydronephrosis.   The craniocaudal dimensions are: right- 11.0 cm,  left- 12.4 cm.     Spleen: The spleen is normal in size,  measuring 16.5 cm in sagittal  dimension.     Aorta and IVC: The visualized portions of the aorta and IVC are  unremarkable. The proximal aorta measures 2.4 cm in diameter and the  IVC measures 2.8 cm in diameter.     Fluid: Small volume ascites in the right upper and left upper  quadrants. No pleural effusion.         Impression:   1.  Cirrhosis without focal hepatic mass.  2.  Evidence of portal hypertension including splenomegaly and small  volume ascites.    4/25/16 Colonoscopy       quality of the bowel preparation was good.  Findings:       The entire examined colon appeared normal on direct and retroflexion        views.  Impression:          - The entire examined colon is normal on direct and                        retroflexion views.                       - No specimens collected.  Recommendation:      - Discharge patient to home (ambulatory).                       - Repeat colonoscopy in 10 years for surveillance.                       Small rectal varix was seen    Frandy was seen today for refill request and routine follow chronic medical conditions  Refills appear to be up to date for now.  May have pharmacy fax us if necessary.    Diagnoses and all orders for this visit:    Chronic anemia  -     Vitamin B12; Future  -     Folate; Future  -     Ferritin; Future  -     Iron and iron binding capacity; Future    Need for vaccination  -     HC ZOSTER VACCINE RECOMBINANT ADJUVANTED IM NJX; Future    Nonalcoholic steatohepatitis (ESTRADA)    Type 2 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, unspecified long term insulin use status (H)    Insomnia, unspecified type    Chronic fatigue    Health care maintenance    Skin rash--RUE, c/w tinea.  Dr. Evangelista recently prescribed econazole cream  for his feet.  I told him to try some on the arm as well.    F/U 6 months and as needed.    Total time spent 40 minutes.  More than 50% of the time spent with Mr. Workman on counseling / coordinating his care      Nerissa Moe M.D.  Internal Medicine  [facility]   pager [phone]

## 2018-09-17 DIAGNOSIS — E11.9 TYPE II DIABETES MELLITUS (H): ICD-10-CM

## 2018-09-18 RX ORDER — OLANZAPINE 2.5 MG/1
2.5 TABLET, FILM COATED ORAL AT BEDTIME
Qty: 90 TABLET | Refills: 1 | Status: SHIPPED | OUTPATIENT
Start: 2018-09-18 | End: 2019-03-07

## 2018-09-19 ENCOUNTER — OFFICE VISIT (OUTPATIENT)
Dept: OTOLARYNGOLOGY | Facility: CLINIC | Age: 53
End: 2018-09-19
Payer: MEDICARE

## 2018-09-19 VITALS
HEART RATE: 70 BPM | DIASTOLIC BLOOD PRESSURE: 68 MMHG | SYSTOLIC BLOOD PRESSURE: 112 MMHG | HEIGHT: 69 IN | WEIGHT: 191 LBS | BODY MASS INDEX: 28.29 KG/M2

## 2018-09-19 DIAGNOSIS — L98.9 SKIN LESION: Primary | ICD-10-CM

## 2018-09-19 PROCEDURE — 88341 IMHCHEM/IMCYTCHM EA ADD ANTB: CPT | Performed by: OTOLARYNGOLOGY

## 2018-09-19 PROCEDURE — 88342 IMHCHEM/IMCYTCHM 1ST ANTB: CPT | Performed by: OTOLARYNGOLOGY

## 2018-09-19 PROCEDURE — 88305 TISSUE EXAM BY PATHOLOGIST: CPT | Performed by: OTOLARYNGOLOGY

## 2018-09-19 NOTE — NURSING NOTE
Obtained written and verbal consent for Right Upper Eyelid Weight Removal and Right Upper Eyelid Biopsy.  Patient tolerated procedure well.  Will contact with results of biopsy.    Julia Salcedo RN  9/19/2018 12:52 PM

## 2018-09-19 NOTE — MR AVS SNAPSHOT
After Visit Summary   9/19/2018    Frandy Workman    MRN: [id number]           Patient Information     Date Of Birth          1965        Visit Information        Provider Department      9/19/2018 11:20 AM Milana Malave MD;  ENT PROCEDURE ROOM [facility] Ear Nose [facility] Throat        Today's Diagnoses     Skin lesion    -  1       Follow-ups after your visit        Your next 10 appointments already scheduled     Oct 03, 2018  1:00 PM CDT   RETURN RETINA with Enriqueta Martin MD   Eye Clinic ([facility])    [address]  [facility]  [facility] [address]   [phone]            Oct 15, 2018  1:00 PM CDT   Lab with  LAB   [facility] Lab [facility])    [address] [place] [id number] [phone]            Oct 15, 2018  2:00 PM CDT   (Arrive by 1:45 PM)   Return General Liver with Santi Dotson MD   [facility] Hepatology ([facility])    [address] [place] [address]  [address]  [facility] [id number] [phone]            Rgant 10, 2019 12:30 PM CST   (Arrive by 12:15 PM)   Return Visit with Lamin Gonzalez DPM   [facility] Endocrinology ([place] [facility])    [address] [place] [id number] [phone]            Feb 11, 2019  1:00 PM CST   (Arrive by 12:45 PM)   RETURN ENDOCRINE with Jennifer Wilcox MD   [facility] Endocrinology ([facility])    [address] [place] [address]  3rd [facility] [id number] [phone]            Mar 18, 2019  1:30 PM CDT   (Arrive by 1:15 PM)   Return Visit with Natalie Russell MD   [facility] Clinic ([facility])    [address] [place] [address]  4th [facility] [id number] [phone]              Who to contact     Please call your clinic at [phone] to:    Ask questions about your  "health    Make or cancel appointments    Discuss your medicines    Learn about your test results    Speak to your doctor            Additional Information About Your Visit        [employer] Information     [employer] gives you secure access to your electronic health record. If you see a primary care provider, you can also send messages to your care team and make appointments. If you have questions, please call your primary care clinic.  If you do not have a primary care provider, please call [phone] and they will assist you.      [employer] is an electronic gateway that provides easy, online access to your medical records. With [employer], you can request a clinic appointment, read your test results, renew a prescription or communicate with your care team.     To access your existing account, please contact your [facility] or call [phone] for assistance.        Care EveryWhere ID     This is your Care EveryWhere ID. This could be used by other organizations to access your [facility] medical records  [id number]        Your Vitals Were     Pulse Height BMI (Body Mass Index)             70 1.753 m (5' 9\") 28.21 kg/m2          Blood Pressure from Last 3 Encounters:   09/19/18 112/68   09/15/18 110/66   08/08/18 117/68    Weight from Last 3 Encounters:   09/19/18 86.6 kg (191 lb)   09/15/18 86.8 kg (191 lb 4.8 oz)   08/08/18 84.6 kg (186 lb 8 oz)              We Performed the Following     BIOPSY SKIN/SUBQ/MUC MEM, SINGLE LESION     Surgical pathology exam          Today's Medication Changes          These changes are accurate as of 9/19/18  1:44 PM.  If you have any questions, ask your nurse or doctor.               These medicines have changed or have updated prescriptions.        Dose/Directions    lactulose 10 GM/15ML solution   Commonly known as:  CHRONULAC   This may have changed:    - how much to take  - additional instructions   Used for:  Liver cirrhosis secondary to ESTRADA (H)        " Dose:  30 g   Take 45 mLs (30 g) by mouth 4 times daily   Quantity:  7200 mL   Refills:  11                Primary Care Provider Office Phone # Fax #    Natalie Mitzi Andrés Russell [phone]       [address] [place] [address] 741  [facility] [address]        Equal Access to Services     MINDI RODRIGUEZ : Hadii aad ku hadasho Soomaali, waaxda luqadaha, qaybta kaalmada adeegyada, waxay idiin hayaan adeeg kharash lashannan . So [facility] [phone].    ATENCIÓN: Si habla español, tiene a hanley disposición servicios gratuitos de asistencia lingüística. Nguyename al [phone].    We comply with applicable federal civil rights laws and [place] laws. We do not discriminate on the basis of race, color, national origin, age, disability, sex, sexual orientation, or gender identity.            Thank you!     Thank you for choosing [facility] E[facility]SE [facility] TH[facility]AT  for your care. Our goal is always to provide you with excellent care. Hearing back from our patients is one way we can continue to improve our services. Please take a few minutes to complete the written survey that you may receive in the mail after your visit with us. Thank you!             Your Updated Medication List - Protect others around you: Learn how to safely use, store and throw away your medicines at www.disposemymeds.org.          This list is accurate as of 9/19/18  1:44 PM.  Always use your most recent med list.                   Brand Name Dispense Instructions for use Diagnosis    aspirin 81 MG EC tablet    ASPIRIN LOW DOSE    90 tablet    Take 1 tablet (81 mg) by mouth daily    Type 2 diabetes mellitus without complication, with long-term current use of insulin (H)       BD VIKTORIA U/F 32G X 4 MM   Generic drug:  insulin pen needle     300 each    Use 5 per day    Type 2 diabetes mellitus without complication, with long-term current use of insulin (H)       blood glucose monitoring lancets     408 each    Use to test blood sugar 4 times daily or as  directed.  1 box = 102 lancets    Type II diabetes mellitus (H)       * blood glucose monitoring test strip    ACCU-CHEK EDINSON PLUS    400 each    Use to test blood sugar 4 times daily    Type II diabetes mellitus (H)       * blood glucose monitoring test strip    ACCU-CHEK EDINSON PLUS    300 strip    USE TO TEST BLOOD SUGARS FOUR TIMES DAILY OR AS DIRECTED    Type II diabetes mellitus (H)       capsaicin 0.025 % Crea cream    ZOSTRIX    60 g    To feet 2-3 times daily as needed.    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    Tinea pedis of both feet       erythromycin ophthalmic ointment    ROMYCIN    1 Tube    Place into the right eye 3 times daily    Post-operative state       ferrous sulfate 325 (65 Fe) MG tablet    IRON    90 tablet    Take 1 tablet (325 mg) by mouth daily (with breakfast)    Vitamin deficiency, Severe protein-calorie malnutrition (H), Cirrhosis of liver without ascites, unspecified hepatic cirrhosis type (H)       insulin degludec 200 UNIT/ML pen    TRESIBA    100 mL    Take 100 units daily.    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 DOSE 75UNITS PER DAY    Type II diabetes mellitus (H)       OLANZapine 2.5 MG tablet    zyPREXA    90 tablet    Take 1 tablet (2.5 mg) by mouth At Bedtime    Insomnia, unspecified type       * omeprazole 40 MG capsule    priLOSEC          * omeprazole 40 MG capsule    priLOSEC    90 capsule    Take 1 capsule (40 mg) by mouth daily    Gastroesophageal reflux disease, esophagitis presence not specified       potassium chloride SA 20 MEQ CR tablet    K-DUR/KLOR-CON M    90 tablet    Take 1 tablet (20 mEq) by mouth daily    Hypokalemia       pravastatin 20 MG tablet    PRAVACHOL    90 tablet    Take 1 tablet (20 mg) by mouth daily    Hyperlipidemia LDL goal <100       Propylene Glycol-Glycerin 1-0.3 % Soln    ARTIFICIAL TEARS    30 mL    Apply 1 drop to eye every 2 hours (while awake)    Post-operative state       * RA VITAMIN B-12 TR 1000 MCG Tbcr   Generic drug:  cyanocobalamin      Take 1,000 mcg by mouth every morning        * cyanocolbalamin 500 MCG tablet    vitamin  B-12    90 tablet    Take 1 tablet (500 mcg) by mouth daily    Vitamin deficiency, Severe protein-calorie malnutrition (H), Cirrhosis of liver without ascites, unspecified hepatic cirrhosis type (H)       REFRESH LACRI-LUBE Oint     1 Tube    Apply 1 Application to eye At Bedtime    Post-operative state       rifaximin 550 MG Tabs tablet    XIFAXAN    60 tablet    Take 1 tablet (550 mg) by mouth 2 times daily    Hepatic encephalopathy (H)       sildenafil 20 MG tablet    REVATIO    90 tablet    Take 2-3  Tablets before activity by mouth    ED (erectile dysfunction)       SM ARTIFICIAL TEARS Soln     1 Bottle    Place 1 drop into the right eye every hour as needed Apply at least 4 times daily and as needed for dry eye    Dry eyes       tadalafil 20 MG tablet    CIALIS    30 tablet    Take 1 tablet (20 mg) by mouth daily as needed    Erectile dysfunction, unspecified erectile  dysfunction type       * tamsulosin 0.4 MG capsule    FLOMAX          * tamsulosin 0.4 MG capsule    FLOMAX    90 capsule    Take 1 capsule (0.4 mg) by mouth daily    Benign prostatic hyperplasia with lower urinary tract symptoms       THERAVITE PO      Take 1 tablet by mouth every morning        UNABLE TO FIND      2 times daily ULTRA MALE ENHANCEMENT POTENCY        VITAMIN D-3 PO      Take 2,000 Units by mouth every morning        * Notice:  This list has 8 medication(s) that are the same as other medications prescribed for you. Read the directions carefully, and ask your doctor or other care provider to review them with you.

## 2018-09-19 NOTE — PROGRESS NOTES
Facial Plastic and Reconstructive Surgery    PROCEDURE: Eyelid weight removal  INDICATION: Recovered facial paralysis, resolution of paralytic lagophthalmos  SURGEON: Milana Malave      Timeout was performed identifying the patient's name site of surgery and procedure to be performed.  Informed consent was signed prior to the procedure.  The area was prepped and draped in usual sterile fashion.    Local was injected to the upper eyelid.  1% lidocaine 1 200,000 epinephrine was used.  A total of 1 mL was injected.  The supratarsal crease incision was made with a 15 blade scalpel.  Tissue was then dissected with scissors identifying the capsule and the eyelid weight.  The capsule was entered sharply with a 15 blade scalpel.  This allowed for removal of the eyelid weight.  The soft tissues were then closed with interrupted deep sutures and subsequently a running 5-0 fast absorbing gut for the skin incision.  Patient tolerated the procedure well without any complications.  Postoperative instructions were given.      Facial Plastic and Reconstructive Surgery     PROCEDURE: Excisional biopsy of concerning cutaneous lesion  Indication: cutaneous lesion with concerning characteristics of growth and shape, right upper eyelid  Surgeon: Milana Malave      Written consent was obtained.  Area prepped and draped in sterile fashion.  2% lidocaine topical injected, 0.3 cc  Then 1% lidocaine with 1:100,000 epinephrine, 1 cc injected.  Areas previously marked.  15 blade scalpel used for excision.  Hemostasis obtained.  Specimen sent for permanent.  Areas primarily closed with monocryl and nylon suture.  Patient tolerated the procedure well with no complications.

## 2018-09-19 NOTE — LETTER
9/19/2018       RE: Frandy Workman  [address] [facility] [address]     Dear Colleague,    Thank you for referring your patient, Frandy Workman, to the [facility] at [facility] Please see a copy of my visit note below.    Facial Plastic and Reconstructive Surgery    PROCEDURE: Eyelid weight removal  INDICATION: Recovered facial paralysis, resolution of paralytic lagophthalmos  SURGEON: Milana Malave      Timeout was performed identifying the patient's name site of surgery and procedure to be performed.  Informed consent was signed prior to the procedure.  The area was prepped and draped in usual sterile fashion.    Local was injected to the upper eyelid.  1% lidocaine 1 200,000 epinephrine was used.  A total of 1 mL was injected.  The supratarsal crease incision was made with a 15 blade scalpel.  Tissue was then dissected with scissors identifying the capsule and the eyelid weight.  The capsule was entered sharply with a 15 blade scalpel.  This allowed for removal of the eyelid weight.  The soft tissues were then closed with interrupted deep sutures and subsequently a running 5-0 fast absorbing gut for the skin incision.  Patient tolerated the procedure well without any complications.  Postoperative instructions were given.      Facial Plastic and Reconstructive Surgery     PROCEDURE: Excisional biopsy of concerning cutaneous lesion  Indication: cutaneous lesion with concerning characteristics of growth and shape, right upper eyelid  Surgeon: Milana Malave      Written consent was obtained.  Area prepped and draped in sterile fashion.  2% lidocaine topical injected, 0.3 cc  Then 1% lidocaine with 1:100,000 epinephrine, 1 cc injected.  Areas previously marked.  15 blade scalpel used for excision.  Hemostasis obtained.  Specimen sent for permanent.  Areas primarily closed with monocryl and nylon suture.  Patient tolerated the procedure well with no  complications.       Again, thank you for allowing me to participate in the care of your patient.      Sincerely,    Milana Malave MD

## 2018-09-20 DIAGNOSIS — K74.69 OTHER CIRRHOSIS OF LIVER (H): Primary | ICD-10-CM

## 2018-09-24 LAB — COPATH REPORT: NORMAL

## 2018-09-25 ENCOUNTER — TELEPHONE (OUTPATIENT)
Dept: OTOLARYNGOLOGY | Facility: CLINIC | Age: 53
End: 2018-09-25

## 2018-09-25 NOTE — TELEPHONE ENCOUNTER
Called and left msg for patient with results of pathology right skin upper eyelid biopsy:    MICROSCOPIC:   The specimen exhibits partially disrupted follicular epithelium and a   dense perifollicular mixed cell   infiltrate with lymphocytes, histiocytes, and admixed neutrophils.  There   is no evidence of malignancy. HSV   and VZV stain are negative     Encouraged patient to call me back with any questions.    Julia Salcedo RN  9/25/2018 12:06 PM

## 2018-10-02 DIAGNOSIS — K74.60 LIVER CIRRHOSIS SECONDARY TO NASH (H): ICD-10-CM

## 2018-10-02 DIAGNOSIS — I85.10 SECONDARY ESOPHAGEAL VARICES WITHOUT BLEEDING (H): ICD-10-CM

## 2018-10-02 DIAGNOSIS — K75.81 LIVER CIRRHOSIS SECONDARY TO NASH (H): ICD-10-CM

## 2018-10-03 ENCOUNTER — OFFICE VISIT (OUTPATIENT)
Dept: OPHTHALMOLOGY | Facility: CLINIC | Age: 53
End: 2018-10-03
Attending: OPHTHALMOLOGY
Payer: MEDICARE

## 2018-10-03 DIAGNOSIS — E11.3313 TYPE 2 DIABETES MELLITUS WITH MODERATE NONPROLIFERATIVE RETINOPATHY OF BOTH EYES AND MACULAR EDEMA, UNSPECIFIED WHETHER LONG TERM INSULIN USE (H): Primary | ICD-10-CM

## 2018-10-03 PROCEDURE — 25000128 H RX IP 250 OP 636: Mod: ZF | Performed by: OPHTHALMOLOGY

## 2018-10-03 PROCEDURE — 67028 INJECTION EYE DRUG: CPT | Mod: ZF | Performed by: OPHTHALMOLOGY

## 2018-10-03 PROCEDURE — C9257 BEVACIZUMAB INJECTION: HCPCS | Mod: ZF | Performed by: OPHTHALMOLOGY

## 2018-10-03 PROCEDURE — 40000269 ZZH STATISTIC NO CHARGE FACILITY FEE: Mod: ZF

## 2018-10-03 RX ORDER — CARVEDILOL 12.5 MG/1
TABLET ORAL
Qty: 180 TABLET | Refills: 3 | Status: SHIPPED | OUTPATIENT
Start: 2018-10-03 | End: 2019-10-01

## 2018-10-03 RX ADMIN — Medication 1.25 MG: at 13:15

## 2018-10-03 ASSESSMENT — EXTERNAL EXAM - LEFT EYE: OS_EXAM: NORMAL

## 2018-10-03 ASSESSMENT — CONF VISUAL FIELD
OS_NORMAL: 1
METHOD: COUNTING FINGERS
OD_NORMAL: 1

## 2018-10-03 ASSESSMENT — REFRACTION_WEARINGRX
OS_ADD: +2.00
OS_AXIS: 040
OS_SPHERE: -7.25
OD_CYLINDER: +0.75
OD_SPHERE: -7.25
SPECS_TYPE: PAL
OD_ADD: +2.00
OD_AXIS: 132
OS_CYLINDER: +0.75

## 2018-10-03 ASSESSMENT — VISUAL ACUITY
OS_CC: 20/25
METHOD: SNELLEN - LINEAR
OD_CC: 20/25
CORRECTION_TYPE: GLASSES
OS_CC+: +1

## 2018-10-03 ASSESSMENT — SLIT LAMP EXAM - LIDS
COMMENTS: SMALL PAPILLOMA ALONG LL MARGIN
COMMENTS: NORMAL

## 2018-10-03 ASSESSMENT — EXTERNAL EXAM - RIGHT EYE: OD_EXAM: NORMAL

## 2018-10-03 ASSESSMENT — TONOMETRY
OS_IOP_MMHG: 13
IOP_METHOD: TONOPEN
OD_IOP_MMHG: 17

## 2018-10-03 ASSESSMENT — CUP TO DISC RATIO
OD_RATIO: 0.25
OS_RATIO: 0.3

## 2018-10-03 NOTE — NURSING NOTE
Chief Complaints and History of Present Illnesses   Patient presents with     Follow Up For     4 week follow up macular degeneration, injection only     HPI    Affected eye(s):  Both   Symptoms:     No floaters   No flashes   No redness   No tearing   No Dryness         Do you have eye pain now?:  No      Comments:  Pt states vision is about the same as last visit. Pt does note that he got new glasses with out a bifocal and has difficulty reading up close now.  DM2 BS: 220 this morning.  Lab Results       Component                Value               Date                       A1C                      6.6                 08/08/2018                 A1C                      6.5                 06/09/2017                 A1C                      7.8                 10/25/2016              [facility] October 3, 2018 12:20 PM

## 2018-10-03 NOTE — MR AVS SNAPSHOT
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)    [address]  [address] [facility] [id number] [phone]            Oct 15, 2018  2:00 PM CDT   (Arrive by 1:45 PM)   Return General Liver with Santi Dotson MD   [facility] ([facility])    [address] [place] [address]  [address] [place] [id number] [phone]            Oct 31, 2018  1:00 PM CDT   RETURN RETINA with Enriqueta Martin MD   [facility] ([facility])    [address]  [facility] [id number] [phone]            Grant 10, 2019 12:30 PM CST   (Arrive by 12:15 PM)   Return Visit with Lamin Gonzalez DPM   [facility] ([facility])    [address]  [address] [facility] [id number] [phone]            Feb 11, 2019  1:00 PM CST   (Arrive by 12:45 PM)   RETURN ENDOCRINE with Jennifer Wilcox MD   [facility] ([facility])    [address]  [address] [facility] [id number] [phone]            Mar 18, 2019  1:30 PM CDT   (Arrive by 1:15 PM)   Return Visit with Natalie Russell MD   [facility] ([facility])    [address] [place] [address]  [address] [facility] [id number] [phone]               Future tests that were ordered for you today     Open Future Orders        Priority Expected Expires Ordered    OCT Retina Spectralis OU (both eyes) Routine  10/3/2019 10/3/2018            Who to contact     Please call your clinic at [phone] to:    Ask questions about your health    Make or cancel appointments    Discuss your medicines    Learn about your test results    Speak to your doctor            Additional Information About Your Visit        [employer] Information     [employer] gives you secure access to your electronic health record. If you see a primary care provider, you can also send messages to your care team and make appointments. If you have questions, please call your primary care clinic.  If you do not have a primary care provider, please call [phone] and they will assist you.      [employer] is an electronic gateway that provides easy, online access to your medical records. With [employer], you can request a clinic appointment, read your test results, renew a prescription or communicate with your care team.     To access your existing account, please contact your [facility] or call [phone] for assistance.        Care EveryWhere ID     This is your Care EveryWhere ID. This could be used by other organizations to access your [facility] medical records  [id number]         Blood Pressure from Last 3 Encounters:   09/19/18 112/68   09/15/18 110/66   08/08/18 117/68    Weight from Last 3 Encounters:   09/19/18 86.6 kg (191 lb)   09/15/18 86.8 kg (191 lb 4.8 oz)   08/08/18 84.6 kg (186 lb 8 oz)              We Performed the Following     Avastin (Bevacizumab) 1.25MG Intravitreal Injection OS (left eye)          Today's Medication Changes          These changes are accurate as of 10/3/18  1:21 PM.  If you have any questions, ask your nurse or doctor.               These medicines have changed or have updated prescriptions.        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 So [facility] [phone].    ATENCIÓN: Si habla español, tiene a hanley disposición servicios gratuitos de asistencia lingüística. [facility] [phone].    We comply with applicable federal civil rights laws and [place] laws. We do not discriminate on the basis of race, color, national origin, age, disability, sex, sexual orientation, or gender identity.            Thank you!     Thank you for choosing [facility]  for your care. Our goal is always to provide you with excellent care. Hearing back from our patients is one way we can continue to improve our services. Please take a few minutes to complete the written survey that you may receive in the mail after your visit with us. Thank you!             Your Updated Medication List - Protect others around you: Learn how to safely use, store and throw away your medicines at www.disposemymeds.org.          This list is accurate as of 10/3/18  1:21 PM.  Always use your most recent med list.                   Brand Name Dispense Instructions for use Diagnosis    aspirin 81 MG EC tablet    ASPIRIN LOW DOSE    90 tablet    Take 1 tablet (81 mg) by mouth daily    Type 2 diabetes mellitus without complication, with long-term current use of insulin (H)       BD VIKTORIA U/F 32G X 4 MM   Generic drug:  insulin pen needle     300 each    Use 5 per day    Type 2 diabetes mellitus  without complication, with long-term current use of insulin (H)       blood glucose monitoring lancets     408 each    Use to test blood sugar 4 times daily or as directed.  1 box = 102 lancets    Type II diabetes mellitus (H)       * blood glucose monitoring test strip    ACCU-CHEK EDINSON PLUS    400 each    Use to test blood sugar 4 times daily    Type II diabetes mellitus (H)       * blood glucose monitoring test strip    ACCU-CHEK EDINSON PLUS    300 strip    USE TO TEST BLOOD SUGARS FOUR TIMES DAILY OR AS DIRECTED    Type II diabetes mellitus (H)       capsaicin 0.025 % Crea cream    ZOSTRIX    60 g    To feet 2-3 times daily as needed.    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    Tinea pedis of both feet       erythromycin ophthalmic ointment    ROMYCIN    1 Tube    Place into the right eye 3 times daily    Post-operative state       ferrous sulfate 325 (65 Fe) MG tablet    IRON    90 tablet    Take 1 tablet (325 mg) by mouth daily (with breakfast)    Vitamin deficiency, Severe protein-calorie malnutrition (H), Cirrhosis of liver without ascites, unspecified hepatic cirrhosis type (H)       insulin degludec 200 UNIT/ML pen    TRESIBA    100 mL    Take 100 units daily.    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 DOSE 75UNITS PER DAY    Type II diabetes mellitus (H)       OLANZapine 2.5 MG tablet    zyPREXA    90 tablet    Take 1 tablet (2.5 mg) by mouth At Bedtime    Insomnia, unspecified type       * omeprazole 40 MG capsule    priLOSEC          * omeprazole 40 MG capsule    priLOSEC    90 capsule    Take 1 capsule (40 mg) by mouth daily    Gastroesophageal reflux disease, esophagitis presence not specified       potassium chloride SA 20 MEQ CR tablet    K-DUR/KLOR-CON M    90 tablet    Take 1 tablet (20 mEq) by mouth daily    Hypokalemia       pravastatin 20 MG tablet    PRAVACHOL    90 tablet    Take 1 tablet (20 mg) by mouth daily    Hyperlipidemia LDL goal <100       Propylene Glycol-Glycerin 1-0.3 % Soln    ARTIFICIAL TEARS    30 mL    Apply 1 drop to eye every 2 hours (while awake)    Post-operative state       * RA VITAMIN B-12 TR 1000 MCG Tbcr   Generic drug:  cyanocobalamin      Take 1,000 mcg by mouth every morning        * cyanocolbalamin 500 MCG tablet    vitamin  B-12    90 tablet    Take 1 tablet (500 mcg) by mouth daily    Vitamin deficiency, Severe protein-calorie malnutrition (H), Cirrhosis of liver without ascites, unspecified hepatic cirrhosis type (H)       REFRESH LACRI-LUBE Oint     1 Tube    Apply 1 Application to eye At Bedtime    Post-operative state       rifaximin 550 MG Tabs tablet    XIFAXAN    60 tablet    Take 1 tablet (550 mg) by mouth 2 times daily    Hepatic encephalopathy (H)       sildenafil 20 MG tablet    REVATIO    90 tablet    Take 2-3  Tablets before activity by mouth    ED (erectile dysfunction)       SM ARTIFICIAL TEARS Soln     1 Bottle    Place 1 drop into the right eye every hour as needed Apply at least 4 times daily and as needed for  dry eye    Dry eyes       tadalafil 20 MG tablet    CIALIS    30 tablet    Take 1 tablet (20 mg) by mouth daily as needed    Erectile dysfunction, unspecified erectile dysfunction type       * tamsulosin 0.4 MG capsule    FLOMAX          * tamsulosin 0.4 MG capsule    FLOMAX    90 capsule    Take 1 capsule (0.4 mg) by mouth daily    Benign prostatic hyperplasia with lower urinary tract symptoms       THERAVITE PO      Take 1 tablet by mouth every morning        UNABLE TO FIND      2 times daily ULTRA MALE ENHANCEMENT POTENCY        VITAMIN D-3 PO      Take 2,000 Units by mouth every morning        * Notice:  This list has 8 medication(s) that are the same as other medications prescribed for you. Read the directions carefully, and ask your doctor or other care provider to review them with you.

## 2018-10-03 NOTE — PROGRESS NOTES
CC: blurry vision  HPI: No significant changes in vision, no flashes and floaters   Plan for Injection only today (3 out of 3 series)  Follow up in 4 weeks, Optical Coherence Tomography possible avastin inj  left eye   Post-op injection instructions given to the patient       Shoaib Wise MD  PGY-3 Ophthalmology    ~~~~~~~~~~~~~~~~~~~~~~~~~~~~~~~~~~   Complete documentation of historical and exam elements from today's encounter can be found in the full encounter summary report (not reduplicated in this progress note).  I personally obtained the chief complaint(s) and history of present illness.  I confirmed and edited as necessary the review of systems, past medical/surgical history, family history, social history, and examination findings as documented by others; and I examined the patient myself.  I personally reviewed the relevant tests, images, and reports as documented above.  I formulated and edited as necessary the assessment and plan and discussed the findings and management plan with the patient and family and I was present for the entire procedure performed by the resident/fellow.    Enriqueta Martin MD  .  Retina Service   Department of Ophthalmology and Visual Neurosciences   [facility]  Phone: [phone]   Fax: [phone]

## 2018-10-15 ENCOUNTER — OFFICE VISIT (OUTPATIENT)
Dept: GASTROENTEROLOGY | Facility: CLINIC | Age: 53
End: 2018-10-15
Attending: INTERNAL MEDICINE
Payer: MEDICARE

## 2018-10-15 ENCOUNTER — TELEPHONE (OUTPATIENT)
Dept: GASTROENTEROLOGY | Facility: CLINIC | Age: 53
End: 2018-10-15

## 2018-10-15 VITALS
BODY MASS INDEX: 28.71 KG/M2 | HEART RATE: 68 BPM | TEMPERATURE: 97.8 F | WEIGHT: 193.8 LBS | SYSTOLIC BLOOD PRESSURE: 109 MMHG | DIASTOLIC BLOOD PRESSURE: 55 MMHG | OXYGEN SATURATION: 98 % | HEIGHT: 69 IN

## 2018-10-15 DIAGNOSIS — E43 SEVERE PROTEIN-CALORIE MALNUTRITION (H): ICD-10-CM

## 2018-10-15 DIAGNOSIS — K74.69 OTHER CIRRHOSIS OF LIVER (H): Primary | ICD-10-CM

## 2018-10-15 DIAGNOSIS — K74.60 CIRRHOSIS OF LIVER WITHOUT ASCITES, UNSPECIFIED HEPATIC CIRRHOSIS TYPE (H): ICD-10-CM

## 2018-10-15 DIAGNOSIS — K74.69 OTHER CIRRHOSIS OF LIVER (H): ICD-10-CM

## 2018-10-15 DIAGNOSIS — E56.9 VITAMIN DEFICIENCY: ICD-10-CM

## 2018-10-15 LAB
ALBUMIN SERPL-MCNC: 3 G/DL (ref 3.4–5)
ALP SERPL-CCNC: 143 U/L (ref 40–150)
ALT SERPL W P-5'-P-CCNC: 35 U/L (ref 0–70)
ANION GAP SERPL CALCULATED.3IONS-SCNC: 7 MMOL/L (ref 3–14)
AST SERPL W P-5'-P-CCNC: 39 U/L (ref 0–45)
BILIRUB DIRECT SERPL-MCNC: 0.3 MG/DL (ref 0–0.2)
BILIRUB SERPL-MCNC: 0.8 MG/DL (ref 0.2–1.3)
BUN SERPL-MCNC: 28 MG/DL (ref 7–30)
CALCIUM SERPL-MCNC: 8.4 MG/DL (ref 8.5–10.1)
CHLORIDE SERPL-SCNC: 110 MMOL/L (ref 94–109)
CO2 SERPL-SCNC: 21 MMOL/L (ref 20–32)
CREAT SERPL-MCNC: 1.1 MG/DL (ref 0.66–1.25)
DEPRECATED CALCIDIOL+CALCIFEROL SERPL-MC: 44 UG/L (ref 20–75)
ERYTHROCYTE [DISTWIDTH] IN BLOOD BY AUTOMATED COUNT: 20.4 % (ref 10–15)
GFR SERPL CREATININE-BSD FRML MDRD: 70 ML/MIN/1.7M2
GLUCOSE SERPL-MCNC: 128 MG/DL (ref 70–99)
HCT VFR BLD AUTO: 34.2 % (ref 40–53)
HGB BLD-MCNC: 10.5 G/DL (ref 13.3–17.7)
INR PPP: 1.4 (ref 0.86–1.14)
MCH RBC QN AUTO: 30.1 PG (ref 26.5–33)
MCHC RBC AUTO-ENTMCNC: 30.7 G/DL (ref 31.5–36.5)
MCV RBC AUTO: 98 FL (ref 78–100)
PLATELET # BLD AUTO: 45 10E9/L (ref 150–450)
POTASSIUM SERPL-SCNC: 4.2 MMOL/L (ref 3.4–5.3)
PROT SERPL-MCNC: 7.3 G/DL (ref 6.8–8.8)
RBC # BLD AUTO: 3.49 10E12/L (ref 4.4–5.9)
SODIUM SERPL-SCNC: 138 MMOL/L (ref 133–144)
WBC # BLD AUTO: 3.2 10E9/L (ref 4–11)

## 2018-10-15 PROCEDURE — G0463 HOSPITAL OUTPT CLINIC VISIT: HCPCS | Mod: ZF

## 2018-10-15 PROCEDURE — 85610 PROTHROMBIN TIME: CPT | Performed by: INTERNAL MEDICINE

## 2018-10-15 PROCEDURE — 80048 BASIC METABOLIC PNL TOTAL CA: CPT | Performed by: INTERNAL MEDICINE

## 2018-10-15 PROCEDURE — 36415 COLL VENOUS BLD VENIPUNCTURE: CPT | Performed by: INTERNAL MEDICINE

## 2018-10-15 PROCEDURE — 82306 VITAMIN D 25 HYDROXY: CPT | Performed by: INTERNAL MEDICINE

## 2018-10-15 PROCEDURE — 85027 COMPLETE CBC AUTOMATED: CPT | Performed by: INTERNAL MEDICINE

## 2018-10-15 PROCEDURE — 80076 HEPATIC FUNCTION PANEL: CPT | Performed by: INTERNAL MEDICINE

## 2018-10-15 RX ORDER — FERROUS SULFATE 325(65) MG
325 TABLET ORAL
Qty: 90 TABLET | Refills: 2 | Status: SHIPPED | OUTPATIENT
Start: 2018-10-15 | End: 2019-03-01

## 2018-10-15 ASSESSMENT — PAIN SCALES - GENERAL: PAINLEVEL: EXTREME PAIN (9)

## 2018-10-15 NOTE — MR AVS SNAPSHOT
After Visit Summary   10/15/2018    Frandy Workman    MRN: [id number]           Patient Information     Date Of Birth          1965        Visit Information        Provider Department      10/15/2018 2:00 PM Santi Rosas MD [facility] Hepatology        Today's Diagnoses     Other cirrhosis of liver (H)    -  1    Vitamin deficiency        Severe protein-calorie malnutrition (H)         Cirrhosis of liver without ascites, unspecified hepatic cirrhosis type (H)            Follow-ups after your visit        Follow-up notes from your care team     Return in about 6 months (around 4/15/2019).      Your next 10 appointments already scheduled     Oct 31, 2018  1:00 PM CDT   RETURN RETINA with Enriqueta Martin MD   Eye Clinic ([facility])    [facility]  [address] [facility]  [address] [address]  [facility] [address] [phone]            Dec 10, 2018 10:45 AM CST   US ABDOMEN COMPLETE with UCUS1   [facility] US ([facility])    [address]  1st Floor  [facility] [id number] [phone]           How do I prepare for my exam? (Food and drink instructions) Adults: No eating, smoking, gum chewing or drinking for 8 hours before the exam. You may take medicine with a small sip of water.  Children: * Infants, breast-fed: may have breast milk up to 2 hours before exam. * Infants, formula: may have bottle until 4 hours before exam. * Children 1-5 years: No food or drink for 4 hours before exam. * Children 6 -12 years: No food or drink for 6 hours before exam. * Children over 12 years: No food or drink for 8 hours before exam.  * J Tube Fed: No need to stop feedings.  What should I wear: Wear comfortable clothes.  How long does the exam take: Most ultrasounds take 30 to 60 minutes.  What should I bring: Bring a list of your medicines, including vitamins, minerals and over-the-counter drugs. It is safest to leave personal  items at home.  Do I need a :  No  is needed.  What do I need to tell my doctor: Tell your doctor about any allergies you may have.  What should I do after the exam: No restrictions, You may resume normal activities.  What is this test: An ultrasound uses sound waves to make pictures of the body. Sound waves do not cause pain. The only discomfort may be the pressure of the wand against your skin or full bladder.  Who should I call with questions: If you have any questions, please call the Imaging Department where you will have your exam. Directions, parking instructions, and other information is available on our website, [email or web address].            Grant 10, 2019 12:30 PM CST   (Arrive by 12:15 PM)   Return Visit with Lamin Gonzalez DPM   [facility] Endocrin[facility])    [address] [place] [address]  3rd [facility] [address]   [phone]            Feb 11, 2019  1:00 PM CST   (Arrive by 12:45 PM)   RETURN ENDOCRINE with Jennifer Wilcox MD   [facility] ([facility])    [address] [place] [address]  [address] [facility] [address]   [phone]            Mar 18, 2019  1:30 PM CDT   (Arrive by 1:15 PM)   Return Visit with Natalie Russell MD   [facility] ([facility])    [address] [place] [address]  [address] [facility] [address]   [phone]            Apr 01, 2019 12:30 PM CDT   Lab with  LAB   [facility] Lab ([facility])    [address] [place] [address]  1st [facility] [address]   [phone]            Apr 01, 2019  1:30 PM CDT   (Arrive by 1:15 PM)   Return General Liver with Santi Dotson MD   [facility] Hepatology ([facility])    [address] [place] [address]  [address]  [facility] [address]   [phone]              Future tests that were ordered for you today     Open Future Orders        Priority  "Expected Expires Ordered    US Abdomen Complete Routine 12/15/2018 10/15/2019 10/15/2018    UPPER GI ENDOSCOPY Routine  11/29/2018 10/15/2018            Who to contact     If you have questions or need follow up information about today's clinic visit or your schedule please contact [facility] HEPATOLOGY directly at [phone].  Normal or non-critical lab and imaging results will be communicated to you by Ecovision[employer], letter or phone within 4 business days after the clinic has received the results. If you do not hear from us within 7 days, please contact the clinic through [email or web address] or phone. If you have a critical or abnormal lab result, we will notify you by phone as soon as possible.  Submit refill requests through [employer] or call your pharmacy and they will forward the refill request to us. Please allow 3 business days for your refill to be completed.          Additional Information About Your Visit        [employer]itch[employer] Information     [employer] gives you secure access to your electronic health record. If you see a primary care provider, you can also send messages to your care team and make appointments. If you have questions, please call your primary care clinic.  If you do not have a primary care provider, please call [phone] and they will assist you.        Care EveryWhere ID     This is your Care EveryWhere ID. This could be used by other organizations to access your [place] medical records  [id number]        Your Vitals Were     Pulse Temperature Height Pulse Oximetry BMI (Body Mass Index)       68 97.8  F (36.6  C) (Oral) 1.753 m (5' 9\") 98% 28.62 kg/m2        Blood Pressure from Last 3 Encounters:   10/15/18 109/55   09/19/18 112/68   09/15/18 110/66    Weight from Last 3 Encounters:   10/15/18 87.9 kg (193 lb 12.8 oz)   09/19/18 86.6 kg (191 lb)   09/15/18 86.8 kg (191 lb 4.8 oz)                 Today's Medication Changes          These changes are accurate as of 10/15/18  2:44 PM.  If you have any questions, " ask your nurse or doctor.               These medicines have changed or have updated prescriptions.        Dose/Directions    ferrous sulfate 325 (65 Fe) MG tablet   Commonly known as:  IRON   This may have changed:  when to take this   Used for:  Vitamin deficiency, Severe protein-calorie malnutrition (H), Cirrhosis of liver without ascites, unspecified hepatic cirrhosis type (H)   Changed by:  Santi Rosas MD        Dose:  325 mg   Take 1 tablet (325 mg) by mouth 3 times daily (with meals)   Quantity:  90 tablet   Refills:  2       lactulose 10 GM/15ML solution   Commonly known as:  CHRONULAC   This may have changed:    - how much to take  - additional instructions   Used for:  Liver cirrhosis secondary to ESTRADA (H)        Dose:  30 g   Take 45 mLs (30 g) by mouth 4 times daily   Quantity:  7200 mL   Refills:  11            Where to get your medicines      These medications were sent to [facility] [address] [place], [place] [address] [facility] & [address] [address]     Phone:  [phone]     ferrous sulfate 325 (65 Fe) MG tablet                Primary Care Provider Office Phone # Fax #    Natalie Mitzi Andrés Russell [phone]       [address] [place] [address]        Equal Access to Services     MINDI RODRIGUEZ AH: Amelia giraldo hadasho Soomaali, waaxda luqadaha, qaybta kaalmada adeegyada, emy vuong. So [facility] [phone].    ATENCIÓN: Si habla español, tiene a hanley disposición servicios gratuitos de asistencia lingüística. Llame al [phone].    We comply with applicable federal civil rights laws and [place] laws. We do not discriminate on the basis of race, color, national origin, age, disability, sex, sexual orientation, or gender identity.            Thank you!     Thank you for choosing [facility] HEPATOLOG[facility]  for your care. Our goal is always to provide you with excellent care. Hearing back from our  patients is one way we can continue to improve our services. Please take a few minutes to complete the written survey that you may receive in the mail after your visit with us. Thank you!             Your Updated Medication List - Protect others around you: Learn how to safely use, store and throw away your medicines at www.disposemymeds.org.          This list is accurate as of 10/15/18  2:44 PM.  Always use your most recent med list.                   Brand Name Dispense Instructions for use Diagnosis    aspirin 81 MG EC tablet    ASPIRIN LOW DOSE    90 tablet    Take 1 tablet (81 mg) by mouth daily    Type 2 diabetes mellitus without complication, with long-term current use of insulin (H)       BD VIKTORIA U/F 32G X 4 MM   Generic drug:  insulin pen needle     300 each    Use 5 per day    Type 2 diabetes mellitus without complication, with long-term current use of insulin (H)       blood glucose monitoring lancets     408 each    Use to test blood sugar 4 times daily or as directed.  1 box = 102 lancets    Type II diabetes mellitus (H)       * blood glucose monitoring test strip    ACCU-CHEK EDINSON PLUS    400 each    Use to test blood sugar 4 times daily    Type II diabetes mellitus (H)       * blood glucose monitoring test strip    ACCU-CHEK EDINSON PLUS    300 strip    USE TO TEST BLOOD SUGARS FOUR TIMES DAILY OR AS DIRECTED    Type II diabetes mellitus (H)       capsaicin 0.025 % Crea cream    ZOSTRIX    60 g    To feet 2-3 times daily as needed.    Type II or unspecified type diabetes mellitus with neurological manifestations, not stated as uncontrolled(250.60) (H)       carvedilol 12.5 MG tablet    COREG    180 tablet    TAKE 1 TABLET(12.5 MG) BY MOUTH TWICE DAILY WITH MEALS    Liver cirrhosis secondary to ESTRADA (H), Secondary esophageal varices without bleeding (H)       dapagliflozin 10 MG Tabs tablet    FARXIGA    90 tablet    Take 1 tablet (10 mg) by mouth daily    Type 2 diabetes mellitus with hyperglycemia,  with long-term current use of insulin (H)       econazole nitrate 1 % cream     85 g    Apply topically daily To feet and toenails.    Tinea pedis of both feet       erythromycin ophthalmic ointment    ROMYCIN    1 Tube    Place into the right eye 3 times daily    Post-operative state       ferrous sulfate 325 (65 Fe) MG tablet    IRON    90 tablet    Take 1 tablet (325 mg) by mouth 3 times daily (with meals)    Vitamin deficiency, Severe protein-calorie malnutrition (H), Cirrhosis of liver without ascites, unspecified hepatic cirrhosis type (H)       insulin degludec 200 UNIT/ML pen    TRESIBA    100 mL    Take 100 units daily.    Type 2 diabetes mellitus with hyperglycemia, with long-term current use of insulin (H)       lactulose 10 GM/15ML solution    CHRONULAC    7200 mL    Take 45 mLs (30 g) by mouth 4 times daily    Liver cirrhosis secondary to ESTRADA (H)       NovoLOG FLEXPEN 100 UNIT/ML injection   Generic drug:  insulin aspart     30 mL    INJECT 1 UNIT PER 4 GRAMS OF CARBS AT MEALS AND SNACKS CORRECTION SCALE OF 1 UNITS PER 25 OVER [address] DOSE 75UNITS PER DAY    Type II diabetes mellitus (H)       OLANZapine 2.5 MG tablet    zyPREXA    90 tablet    Take 1 tablet (2.5 mg) by mouth At Bedtime    Insomnia, unspecified type       omeprazole 40 MG capsule    priLOSEC    90 capsule    Take 1 capsule (40 mg) by mouth daily    Gastroesophageal reflux disease, esophagitis presence not specified       potassium chloride SA 20 MEQ CR tablet    K-DUR/KLOR-CON M    90 tablet    Take 1 tablet (20 mEq) by mouth daily    Hypokalemia       pravastatin 20 MG tablet    PRAVACHOL    90 tablet    Take 1 tablet (20 mg) by mouth daily    Hyperlipidemia LDL goal <100       Propylene Glycol-Glycerin 1-0.3 % Soln    ARTIFICIAL TEARS    30 mL    Apply 1 drop to eye every 2 hours (while awake)    Post-operative state       RA VITAMIN B-12 TR 1000 MCG Tbcr   Generic drug:  cyanocobalamin      Take 1,000 mcg by mouth every morning         REFRESH LACRI-LUBE Oint     1 Tube    Apply 1 Application to eye At Bedtime    Post-operative state       rifaximin 550 MG Tabs tablet    XIFAXAN    60 tablet    Take 1 tablet (550 mg) by mouth 2 times daily    Hepatic encephalopathy (H)       sildenafil 20 MG tablet    REVATIO    90 tablet    Take 2-3  Tablets before activity by mouth    ED (erectile dysfunction)       SM ARTIFICIAL TEARS Soln     1 Bottle    Place 1 drop into the right eye every hour as needed Apply at least 4 times daily and as needed for dry eye    Dry eyes       tadalafil 20 MG tablet    CIALIS    30 tablet    Take 1 tablet (20 mg) by mouth daily as needed    Erectile dysfunction, unspecified erectile dysfunction type       tamsulosin 0.4 MG capsule    FLOMAX    90 capsule    Take 1 capsule (0.4 mg) by mouth daily    Benign prostatic hyperplasia with lower urinary tract symptoms       THERAVITE PO      Take 1 tablet by mouth every morning        UNABLE TO FIND      2 times daily ULTRA MALE ENHANCEMENT POTENCY        VITAMIN D-3 PO      Take 2,000 Units by mouth every morning        * Notice:  This list has 2 medication(s) that are the same as other medications prescribed for you. Read the directions carefully, and ask your doctor or other care provider to review them with you.

## 2018-10-15 NOTE — TELEPHONE ENCOUNTER
DATE:  10/15/2018   TIME OF RECEIPT FROM LAB:  1315  LAB TEST:  Plt  LAB VALUE:  45  Plt 45. Consistent with previous results for over the past year. Patient's normal.

## 2018-10-15 NOTE — PROGRESS NOTES
[facility]    Hepatology follow-up    Follow-up visit for cirrhosis    Subjective:  53 year old male    Cirrhosis  - dx 2013  - ESTRADA, A1-AT phenotype- PiMZ  - hx HE  - hx ascites  - no hx variceal bleed  - last EGD Nov 2017- small EV, portal hypertensive gastropathy  - HCC screening- Abd U/S Jun 2018    The patient comes to clinic this afternoon for routine follow-up of cirrhosis.  Last clinic visit 04/2018.  The patient was recently started on iron after iron deficiency was diagnosed after he developed symptoms of fatigue.  Hemoglobin A1c was 6.6% in August 2018.  No hospital admissions, ER visits or other new medications since last clinic visit.      The patient is well today.  He reports some ongoing mild fatigue.  He denies any symptoms specific to liver disease.      The patient denies jaundice, lower extremity edema, abdominal distention, lethargy or confusion.  The patient is taking lactulose and rifaximin as prescribed with adequate bowel movements.      The patient denies melena, hematemesis, hematochezia.      No history of fevers, sweats or chills.  Weight has increased 7lbs since last clinic visit.      The patient does not drink alcohol.       Medical hx Surgical hx   Past Medical History:   Diagnosis Date     Anemia 2013     BPH (benign prostatic hyperplasia)      Cholelithiasis      Conductive hearing loss 8/16/2017     Depressive disorder 1986     Gastroesophageal reflux disease 12/1/2014     Hepatitis 2014     Hyperlipidemia      Liver cirrhosis secondary to ESTRADA (H)      Thrombocytopenia (H)      Type II diabetes mellitus (H)       Past Surgical History:   Procedure Laterality Date     ESOPHAGOSCOPY, GASTROSCOPY, DUODENOSCOPY (EGD), COMBINED N/A 11/17/2016    Procedure: COMBINED ESOPHAGOSCOPY, GASTROSCOPY, DUODENOSCOPY (EGD);  Surgeon: Santi Rosas MD;  Location:  GI     ESOPHAGOSCOPY, GASTROSCOPY, DUODENOSCOPY (EGD), COMBINED N/A 11/17/2017    Procedure:  COMBINED ESOPHAGOSCOPY, GASTROSCOPY, DUODENOSCOPY (EGD);  EGD;  Surgeon: Santi Rosas MD;  Location: [facility] GI     HEAD & NECK SURGERY       IMPLANT GOLD WEIGHT EYELID Right 11/16/2017    Procedure: IMPLANT WEIGHT EYELID;  Right Upper Eyelid Weight, right tarsal strip lower eyelid;  Surgeon: Milana Malave MD;  Location: [facility] OR     KNEE SURGERY Left      ORTHOPEDIC SURGERY       PAROTIDECTOMY, RADICAL NECK DISSECTION Right 11/2/2017    Procedure: PAROTIDECTOMY, RADICAL NECK DISSECTION;  Right Superfacial Parotidectomy , Facial nerve repair. with [facility] facial nerve monitor.;  Surgeon: Asiya Morgan MD;  Location: [facility] OR     PICC INSERTION Left 11/06/2017    4fr SL BioFlo PICC, 44cm in the L basilic vein w/ tip in the low SVC     VASCULAR SURGERY            Medications  Prior to Admission medications    Medication Sig Start Date End Date Taking? Authorizing Provider   Artificial Tear Ointment (REFRESH LACRI-LUBE) OINT Apply 1 Application to eye At Bedtime 7/2/18  Yes Milana Malave MD   Artificial Tear Solution (SM ARTIFICIAL TEARS) SOLN Place 1 drop into the right eye every hour as needed Apply at least 4 times daily and as needed for dry eye 7/2/18  Yes Milana Malave MD   aspirin (ASPIRIN LOW DOSE) 81 MG EC tablet Take 1 tablet (81 mg) by mouth daily 6/6/18  Yes Brenda Delgadillo MD   BD VIKTORIA U/F 32G X 4 MM insulin pen needle Use 5 per day 4/11/18  Yes Jennifer Wilcox MD   blood glucose monitoring (ACCU-CHEK EDINSON PLUS) test strip USE TO TEST BLOOD SUGARS FOUR TIMES DAILY OR AS DIRECTED 9/18/18  Yes Jennifer Wilcox MD   blood glucose monitoring (ACCU-CHEK EDINSON PLUS) test strip Use to test blood sugar 4 times daily 10/13/17  Yes Natalie Chun MD   blood glucose monitoring (ACCU-CHEK FASTCLIX) lancets Use to test blood sugar 4 times daily or as directed.  1 box = 102 lancets 10/13/17  Yes Natalie Chun MD   capsaicin (ZOSTRIX)  0.025 % CREA cream To feet 2-3 times daily as needed. 3/8/18  Yes Lamin Gonzalez DPM   carvedilol (COREG) 12.5 MG tablet TAKE 1 TABLET(12.5 MG) BY MOUTH TWICE DAILY WITH MEALS 10/3/18  Yes Natalie Chun MD   Cholecalciferol (VITAMIN D-3 PO) Take 2,000 Units by mouth every morning    Yes Reported, Patient   cyanocobalamin (RA VITAMIN B-12 TR) 1000 MCG TBCR Take 1,000 mcg by mouth every morning  3/14/16  Yes Reported, Patient   dapagliflozin (FARXIGA) 10 MG TABS tablet Take 1 tablet (10 mg) by mouth daily 8/23/18  Yes Jennifer Wilcox MD   econazole nitrate 1 % cream Apply topically daily To feet and toenails. 9/13/18  Yes Lamin Gonzalez DPM   ferrous sulfate (IRON) 325 (65 Fe) MG tablet Take 1 tablet (325 mg) by mouth daily (with breakfast) 9/19/18  Yes Nerissa Moe MD   insulin degludec (TRESIBA) 200 UNIT/ML pen Take 100 units daily. 7/26/18  Yes Jennifer Wilcox MD   lactulose (CHRONULAC) 10 GM/15ML solution Take 45 mLs (30 g) by mouth 4 times daily  Patient taking differently: Take 60 g by mouth 4 times daily 2-6 TIMES A DAY 10/13/17  Yes Santi Rosas MD   Multiple Vitamin (THERAVITE PO) Take 1 tablet by mouth every morning  3/14/16  Yes Reported, Patient   NOVOLOG FLEXPEN 100 UNIT/ML soln INJECT 1 UNIT PER 4 GRAMS OF CARBS AT MEALS AND SNACKS CORRECTION SCALE OF 1 UNITS PER 25 OVER [address]. AVE DOSE 75UNITS PER DAY 3/26/18  Yes Natalie Chun MD   OLANZapine (ZYPREXA) 2.5 MG tablet Take 1 tablet (2.5 mg) by mouth At Bedtime 9/18/18  Yes Natalie Chun MD   omeprazole (PRILOSEC) 40 MG capsule Take 1 capsule (40 mg) by mouth daily 6/20/18  Yes Natalie Chun MD   potassium chloride SA (K-DUR/KLOR-CON M) 20 MEQ CR tablet Take 1 tablet (20 mEq) by mouth daily 6/19/18  Yes Natalie Chun MD   pravastatin (PRAVACHOL) 20 MG tablet Take 1 tablet (20 mg) by mouth daily 5/21/18  Yes Jennifer Wilcox Dev  "MD Telly   Propylene Glycol-Glycerin (ARTIFICIAL TEARS) 1-0.3 % SOLN Apply 1 drop to eye every 2 hours (while awake) 7/12/18  Yes Tom Amezquita, OD   rifaximin (XIFAXAN) 550 MG TABS tablet Take 1 tablet (550 mg) by mouth 2 times daily 10/13/17  Yes Santi Rosas MD   sildenafil (REVATIO) 20 MG tablet Take 2-3  Tablets before activity by mouth 8/13/18  Yes Jennifer Wilcox MD   tadalafil (CIALIS) 20 MG tablet Take 1 tablet (20 mg) by mouth daily as needed 8/8/18  Yes Jennifer Wilcox MD   tamsulosin (FLOMAX) 0.4 MG capsule Take 1 capsule (0.4 mg) by mouth daily 6/20/18  Yes Natalie Chun MD   UNABLE TO FIND 2 times daily ULTRA MALE ENHANCEMENT POTENCY   Yes Reported, Patient   erythromycin (ROMYCIN) ophthalmic ointment Place into the right eye 3 times daily  Patient not taking: Reported on 10/15/2018 7/12/18   Tom Amezquita, OD       Allergies  Allergies   Allergen Reactions     Codeine Other (See Comments)     Cannot take due to liver  Cannot tolerate oral narcotics       Review of systems  A 10-point review of systems was negative    Examination  /55  Pulse 68  Temp 97.8  F (36.6  C) (Oral)  Ht 1.753 m (5' 9\")  Wt 87.9 kg (193 lb 12.8 oz)  SpO2 98%  BMI 28.62 kg/m2  Body mass index is 28.62 kg/(m^2).    Gen- well, NAD, A+Ox3, normal color  CVS- RRR  RS- CTA  Abd- obese, striae, soft, non-tender, palpable splenomegaly, no ascites or hepatomegaly on palpation or percussion, BS+  Extr- hands normal, no LEILA  Skin- no rash or jaundice  Neuro- mild asterixis  Psych- normal mood    Laboratory  Lab Results   Component Value Date     10/15/2018    POTASSIUM 4.2 10/15/2018    CHLORIDE 110 10/15/2018    CO2 21 10/15/2018    BUN 28 10/15/2018    CR 1.10 10/15/2018       Lab Results   Component Value Date    BILITOTAL 0.8 10/15/2018    ALT 35 10/15/2018    AST 39 10/15/2018    ALKPHOS 143 10/15/2018       Lab Results   Component Value Date    ALBUMIN " 3.0 10/15/2018    PROTTOTAL 7.3 10/15/2018        Lab Results   Component Value Date    WBC 3.2 10/15/2018    HGB 10.5 10/15/2018    MCV 98 10/15/2018    PLT 45 10/15/2018       Lab Results   Component Value Date    INR 1.40 10/15/2018       Radiology  Abd U/S Jun 2018 reviewed    Assessment  53 year old male who presents for routine follow-up of ESTRADA cirrhosis complicated with hepatic encephalopathy.  MELD-Na= 11(stable).  Hepatic encephalopathy adequately controlled on current medical regimen.  Will increase iron supplements to TID as treatment for iron deficiency which should improve fatigue.  Would consider colonoscopy if no improvement in iron indices with supplementation.  (Last colonoscopy 2016 showed no evidence of polyps or tumor).  Due for surveillance of esophageal varices.  Up to date with HCC screening.    Plan  1.  Increase Fe to TID  2.  Check iron studies in 3 months  3.  Continue lactulose and rifaximin  4.  EGD  5.  Abd U/S in Dec 2018  6.  Follow-up in 6 months with LILLIAN Banuelos MD  Hepatology  [facility]

## 2018-10-15 NOTE — LETTER
10/15/2018      RE: Frandy Workman  [address]  [facility] [address]       [facility]    Hepatology follow-up    Follow-up visit for cirrhosis    Subjective:  53 year old male    Cirrhosis  - dx 2013  - ESTRADA, A1-AT phenotype- PiMZ  - hx HE  - hx ascites  - no hx variceal bleed  - last EGD Nov 2017- small EV, portal hypertensive gastropathy  - HCC screening- Abd U/S Jun 2018    The patient comes to clinic this afternoon for routine follow-up of cirrhosis.  Last clinic visit 04/2018.  The patient was recently started on iron after iron deficiency was diagnosed after he developed symptoms of fatigue.  Hemoglobin A1c was 6.6% in August 2018.  No hospital admissions, ER visits or other new medications since last clinic visit.      The patient is well today.  He reports some ongoing mild fatigue.  He denies any symptoms specific to liver disease.      The patient denies jaundice, lower extremity edema, abdominal distention, lethargy or confusion.  The patient is taking lactulose and rifaximin as prescribed with adequate bowel movements.      The patient denies melena, hematemesis, hematochezia.      No history of fevers, sweats or chills.  Weight has increased 7lbs since last clinic visit.      The patient does not drink alcohol.       Medical hx Surgical hx   Past Medical History:   Diagnosis Date     Anemia 2013     BPH (benign prostatic hyperplasia)      Cholelithiasis      Conductive hearing loss 8/16/2017     Depressive disorder 1986     Gastroesophageal reflux disease 12/1/2014     Hepatitis 2014     Hyperlipidemia      Liver cirrhosis secondary to ESTRADA (H)      Thrombocytopenia (H)      Type II diabetes mellitus (H)       Past Surgical History:   Procedure Laterality Date     ESOPHAGOSCOPY, GASTROSCOPY, DUODENOSCOPY (EGD), COMBINED N/A 11/17/2016    Procedure: COMBINED ESOPHAGOSCOPY, GASTROSCOPY, DUODENOSCOPY (EGD);  Surgeon: Santi Rosas MD;  Location:  GI      ESOPHAGOSCOPY, GASTROSCOPY, DUODENOSCOPY (EGD), COMBINED N/A 11/17/2017    Procedure: COMBINED ESOPHAGOSCOPY, GASTROSCOPY, DUODENOSCOPY (EGD);  EGD;  Surgeon: Santi Rosas MD;  Location: [facility] GI     HEAD & NECK SURGERY       IMPLANT GOLD WEIGHT EYELID Right 11/16/2017    Procedure: IMPLANT WEIGHT EYELID;  Right Upper Eyelid Weight, right tarsal strip lower eyelid;  Surgeon: Milana Malave MD;  Location: [facility] OR     KNEE SURGERY Left      ORTHOPEDIC SURGERY       PAROTIDECTOMY, RADICAL NECK DISSECTION Right 11/2/2017    Procedure: PAROTIDECTOMY, RADICAL NECK DISSECTION;  Right Superfacial Parotidectomy , Facial nerve repair. with [facility] facial nerve monitor.;  Surgeon: Asiya Morgan MD;  Location: [facility] OR     PICC INSERTION Left 11/06/2017    4fr SL BioFlo PICC, 44cm in the L basilic vein w/ tip in the low SVC     VASCULAR SURGERY            Medications  Prior to Admission medications    Medication Sig Start Date End Date Taking? Authorizing Provider   Artificial Tear Ointment (REFRESH LACRI-LUBE) OINT Apply 1 Application to eye At Bedtime 7/2/18  Yes Milana Malave MD   Artificial Tear Solution (SM ARTIFICIAL TEARS) SOLN Place 1 drop into the right eye every hour as needed Apply at least 4 times daily and as needed for dry eye 7/2/18  Yes Milana Malave MD   aspirin (ASPIRIN LOW DOSE) 81 MG EC tablet Take 1 tablet (81 mg) by mouth daily 6/6/18  Yes Brenda Delgadillo MD   BD VIKTORIA U/F 32G X 4 MM insulin pen needle Use 5 per day 4/11/18  Yes Jennifer Wilcox MD   blood glucose monitoring (ACCU-CHEK EDINSON PLUS) test strip USE TO TEST BLOOD SUGARS FOUR TIMES DAILY OR AS DIRECTED 9/18/18  Yes Jennifer Wilcox MD   blood glucose monitoring (ACCU-CHEK EDINSON PLUS) test strip Use to test blood sugar 4 times daily 10/13/17  Yes Natalie Chun MD   blood glucose monitoring (ACCU-CHEK FASTCLIX) lancets Use to test blood sugar 4 times daily or as directed.  1 box  = 102 lancets 10/13/17  Yes Natalie Chun MD   capsaicin (ZOSTRIX) 0.025 % CREA cream To feet 2-3 times daily as needed. 3/8/18  Yes Lamin Gonzalez DPM   carvedilol (COREG) 12.5 MG tablet TAKE 1 TABLET(12.5 MG) BY MOUTH TWICE DAILY WITH MEALS 10/3/18  Yes Natalie Chun MD   Cholecalciferol (VITAMIN D-3 PO) Take 2,000 Units by mouth every morning    Yes Reported, Patient   cyanocobalamin (RA VITAMIN B-12 TR) 1000 MCG TBCR Take 1,000 mcg by mouth every morning  3/14/16  Yes Reported, Patient   dapagliflozin (FARXIGA) 10 MG TABS tablet Take 1 tablet (10 mg) by mouth daily 8/23/18  Yes Jennifer Wilcox MD   econazole nitrate 1 % cream Apply topically daily To feet and toenails. 9/13/18  Yes Lamin Gonzalez DPM   ferrous sulfate (IRON) 325 (65 Fe) MG tablet Take 1 tablet (325 mg) by mouth daily (with breakfast) 9/19/18  Yes Nerissa Moe MD   insulin degludec (TRESIBA) 200 UNIT/ML pen Take 100 units daily. 7/26/18  Yes Jennifer Wilcox MD   lactulose (CHRONULAC) 10 GM/15ML solution Take 45 mLs (30 g) by mouth 4 times daily  Patient taking differently: Take 60 g by mouth 4 times daily 2-6 TIMES A DAY 10/13/17  Yes Santi Rosas MD   Multiple Vitamin (THERAVITE PO) Take 1 tablet by mouth every morning  3/14/16  Yes Reported, Patient   NOVOLOG FLEXPEN 100 UNIT/ML soln INJECT 1 UNIT PER 4 GRAMS OF CARBS AT MEALS AND SNACKS CORRECTION SCALE OF 1 UNITS PER 25 OVER [address] AVE DOSE 75UNITS PER DAY 3/26/18  Yes Natalie Chun MD   OLANZapine (ZYPREXA) 2.5 MG tablet Take 1 tablet (2.5 mg) by mouth At Bedtime 9/18/18  Yes Natalie Chun MD   omeprazole (PRILOSEC) 40 MG capsule Take 1 capsule (40 mg) by mouth daily 6/20/18  Yes Natalie Chun MD   potassium chloride SA (K-DUR/KLOR-CON M) 20 MEQ CR tablet Take 1 tablet (20 mEq) by mouth daily 6/19/18  Yes Natalie Chun MD   pravastatin (PRAVACHOL) 20 MG  "tablet Take 1 tablet (20 mg) by mouth daily 5/21/18  Yes Jennifer Wilcox MD   Propylene Glycol-Glycerin (ARTIFICIAL TEARS) 1-0.3 % SOLN Apply 1 drop to eye every 2 hours (while awake) 7/12/18  Yes Tom Amezquita, OD   rifaximin (XIFAXAN) 550 MG TABS tablet Take 1 tablet (550 mg) by mouth 2 times daily 10/13/17  Yes Santi Rosas MD   sildenafil (REVATIO) 20 MG tablet Take 2-3  Tablets before activity by mouth 8/13/18  Yes Jennifer Wilcox MD   tadalafil (CIALIS) 20 MG tablet Take 1 tablet (20 mg) by mouth daily as needed 8/8/18  Yes Jennifer Wilcox MD   tamsulosin (FLOMAX) 0.4 MG capsule Take 1 capsule (0.4 mg) by mouth daily 6/20/18  Yes Natalie Chun MD   UNABLE TO FIND 2 times daily ULTRA MALE ENHANCEMENT POTENCY   Yes Reported, Patient   erythromycin (ROMYCIN) ophthalmic ointment Place into the right eye 3 times daily  Patient not taking: Reported on 10/15/2018 7/12/18   Tom Amezquita, OD       Allergies  Allergies   Allergen Reactions     Codeine Other (See Comments)     Cannot take due to liver  Cannot tolerate oral narcotics       Review of systems  A 10-point review of systems was negative    Examination  /55  Pulse 68  Temp 97.8  F (36.6  C) (Oral)  Ht 1.753 m (5' 9\")  Wt 87.9 kg (193 lb 12.8 oz)  SpO2 98%  BMI 28.62 kg/m2  Body mass index is 28.62 kg/(m^2).    Gen- well, NAD, A+Ox3, normal color  CVS- RRR  RS- CTA  Abd- obese, striae, soft, non-tender, palpable splenomegaly, no ascites or hepatomegaly on palpation or percussion, BS+  Extr- hands normal, no LEILA  Skin- no rash or jaundice  Neuro- mild asterixis  Psych- normal mood    Laboratory  Lab Results   Component Value Date     10/15/2018    POTASSIUM 4.2 10/15/2018    CHLORIDE 110 10/15/2018    CO2 21 10/15/2018    BUN 28 10/15/2018    CR 1.10 10/15/2018       Lab Results   Component Value Date    BILITOTAL 0.8 10/15/2018    ALT 35 10/15/2018    AST 39 10/15/2018 "    ALKPHOS 143 10/15/2018       Lab Results   Component Value Date    ALBUMIN 3.0 10/15/2018    PROTTOTAL 7.3 10/15/2018        Lab Results   Component Value Date    WBC 3.2 10/15/2018    HGB 10.5 10/15/2018    MCV 98 10/15/2018    PLT 45 10/15/2018       Lab Results   Component Value Date    INR 1.40 10/15/2018       Radiology  Abd U/S Jun 2018 reviewed    Assessment  53 year old male who presents for routine follow-up of ESTRADA cirrhosis complicated with hepatic encephalopathy.  MELD-Na= 11(stable).  Hepatic encephalopathy adequately controlled on current medical regimen.  Will increase iron supplements to TID as treatment for iron deficiency which should improve fatigue.  Would consider colonoscopy if no improvement in iron indices with supplementation.  (Last colonoscopy 2016 showed no evidence of polyps or tumor).  Due for surveillance of esophageal varices.  Up to date with HCC screening.    Plan  1.  Increase Fe to TID  2.  Check iron studies in 3 months  3.  Continue lactulose and rifaximin  4.  EGD  5.  Abd U/S in Dec 2018  6.  Follow-up in 6 months with LILLIAN Banuelos MD  Hepatology  [facility]

## 2018-10-16 DIAGNOSIS — K76.82 HEPATIC ENCEPHALOPATHY (H): ICD-10-CM

## 2018-10-16 PROBLEM — K11.3 PAROTID ABSCESS: Status: RESOLVED | Noted: 2017-11-03 | Resolved: 2018-10-16

## 2018-10-18 LAB — VIT B1 BLD-MCNC: 132 NMOL/L (ref 70–180)

## 2018-10-31 ENCOUNTER — OFFICE VISIT (OUTPATIENT)
Dept: OPHTHALMOLOGY | Facility: CLINIC | Age: 53
End: 2018-10-31
Attending: OPHTHALMOLOGY
Payer: MEDICARE

## 2018-10-31 DIAGNOSIS — E11.3313 TYPE 2 DIABETES MELLITUS WITH MODERATE NONPROLIFERATIVE RETINOPATHY OF BOTH EYES AND MACULAR EDEMA, UNSPECIFIED WHETHER LONG TERM INSULIN USE (H): ICD-10-CM

## 2018-10-31 PROCEDURE — 92134 CPTRZ OPH DX IMG PST SGM RTA: CPT | Mod: ZF | Performed by: OPHTHALMOLOGY

## 2018-10-31 PROCEDURE — G0463 HOSPITAL OUTPT CLINIC VISIT: HCPCS | Mod: ZF

## 2018-10-31 ASSESSMENT — EXTERNAL EXAM - RIGHT EYE: OD_EXAM: NORMAL

## 2018-10-31 ASSESSMENT — TONOMETRY
IOP_METHOD: TONOPEN
OD_IOP_MMHG: 14
OS_IOP_MMHG: 14

## 2018-10-31 ASSESSMENT — VISUAL ACUITY
METHOD: SNELLEN - LINEAR
OS_CC: 20/25
OD_CC: 20/30
CORRECTION_TYPE: GLASSES

## 2018-10-31 ASSESSMENT — EXTERNAL EXAM - LEFT EYE: OS_EXAM: NORMAL

## 2018-10-31 ASSESSMENT — CUP TO DISC RATIO
OD_RATIO: 0.25
OS_RATIO: 0.3

## 2018-10-31 ASSESSMENT — SLIT LAMP EXAM - LIDS
COMMENTS: SMALL PAPILLOMA ALONG LL MARGIN
COMMENTS: NORMAL

## 2018-10-31 NOTE — PROGRESS NOTES
CC:  Follow up dme left eye   HPI: Frandy Workman is a  53 year old year-old patient with history of Diabetes mellitus for 24 ys . Patient on insulin.  HBA1c 6.6 08/08/18. Patient was evaluated by dr. Amezquita and sent to the retina clinic for management of Diabetic macular edema     Retinal Imaging:  OCT 10-31-18  RE: no intraretinal fluid   LE: previous dme resolved    fluorescein angiography transits left eye 08/09/18 :  Right eye: diffuse microaneurysms, late macula leakage; few peripheral capillary non perfusion   Left eye: diffuse microaneurysms, late macula leakage; few peripheral capillary non perfusion     Assessment & Plan:    1.  Moderate nonproliferative diabetic retinopathy of both eyes    - Blood pressure (<120/80) and blood glucose (HbA1c <7.0) control discussed with patient. Patient advised  that failure to adequately control each may lead to vision loss. The patient expressed understanding.    2. Diabetic macular edema left eye    - status post avastin x3   - plan to observe given significant improvement, recheck in 1 month            3. Myopia, bilateral  Prescription given last yuval     4. Senile nuclear sclerosis, bilateral  Comment: Not visually significant OU  Plan:  No treatment indicated. Monitor annually.    Planf: follow up in 4 weeks with dilated fundus exam and oct helen Santamaria MD, PhD  Vitreoretinal Surgery Fellow      ~~~~~~~~~~~~~~~~~~~~~~~~~~~~~~~~~~   Complete documentation of historical and exam elements from today's encounter can be found in the full encounter summary report (not reduplicated in this progress note).  I personally obtained the chief complaint(s) and history of present illness.  I confirmed and edited as necessary the review of systems, past medical/surgical history, family history, social history, and examination findings as documented by others; and I examined the patient myself.  I personally reviewed the relevant tests, images, and reports as documented  above.  I personally reviewed the ophthalmic test(s) associated with this encounter, agree with the interpretation(s) as documented by the resident/fellow, and have edited the corresponding report(s) as necessary.   I formulated and edited as necessary the assessment and plan and discussed the findings and management plan with the patient and family    Enriqueta Martin MD   of Ophthalmology.  Retina Service   Department of Ophthalmology and Visual Neurosciences   [facility]  Phone: [phone]   Fax: [phone]

## 2018-10-31 NOTE — MR AVS SNAPSHOT
After Visit Summary   10/31/2018    Frandy Workman    MRN: [id number]           Patient Information     Date Of Birth          1965        Visit Information        Provider Department      10/31/2018 1:00 PM Enriqueta Martin MD [facility]        Today's Diagnoses     Type 2 diabetes mellitus with moderate nonproliferative retinopathy of both eyes and macular edema, unspecified whether long term insulin use (H)           Follow-ups after your visit        Follow-up notes from your care team     Return in about 4 weeks (around 11/28/2018) for OCT.      Your next 10 appointments already scheduled     Dec 10, 2018 10:45 AM CST   US ABDOMEN COMPLETE with [facility]    [facility]    [address]  1st Floor  [facility] [address] [phone]           How do I prepare for my exam? (Food and drink instructions) Adults: No eating, smoking, gum chewing or drinking for 8 hours before the exam. You may take medicine with a small sip of water.  Children: * Infants, breast-fed: may have breast milk up to 2 hours before exam. * Infants, formula: may have bottle until 4 hours before exam. * Children 1-5 years: No food or drink for 4 hours before exam. * Children 6 -12 years: No food or drink for 6 hours before exam. * Children over 12 years: No food or drink for 8 hours before exam.  * J Tube Fed: No need to stop feedings.  What should I wear: Wear comfortable clothes.  How long does the exam take: Most ultrasounds take 30 to 60 minutes.  What should I bring: Bring a list of your medicines, including vitamins, minerals and over-the-counter drugs. It is safest to leave personal items at home.  Do I need a :  No  is needed.  What do I need to tell my doctor: Tell your doctor about any allergies you may have.  What should I do after the exam: No restrictions, You may resume normal activities.  What is this test: An ultrasound uses  sound waves to make pictures of the body. Sound waves do not cause pain. The only discomfort may be the pressure of the wand against your skin or full bladder.  Who should I call with questions: If you have any questions, please call the Imaging Department where you will have your exam. Directions, parking instructions, and other information is available on our website, [email or web address].            Dec 28, 2018   Procedure with Santi Dotson MD   [facility] ([facility])    [address] [place] [address]  [address] [facility] [address] [phone]           Located in the Clinics and Surgery Center at [address] [place] [address].   parking is very convenient and highly recommended.  is a $6 flat rate fee.  Both  and self parkers should enter the main arrival plaza from [facility]; parking attendants will direct you based on your parking preference.            Grant 10, 2019 12:30 PM CST   (Arrive by 12:15 PM)   Return Visit with Lamin Gonzalez DPM   [facility] ([facility])    [address] [place] [address]  [address] [facility] [address] [phone]            Feb 11, 2019  1:00 PM CST   (Arrive by 12:45 PM)   RETURN ENDOCRINE with Jennifer Wilcox MD   [facility])    [address] [place] [address]  [address] [facility] [address] [phone]            Mar 18, 2019  1:30 PM CDT   (Arrive by 1:15 PM)   Return Visit with Natalie Russell MD   [facility] ([facility])    [address] [place] [address]  [address] [facility] [address] [phone]            Apr 01, 2019 12:30 PM CDT   Lab with  LAB   [facility] ([facility])    [address] [place] [address]  [address] [facility] [address]   [phone]            Apr 01, 2019  1:30 PM CDT   (Arrive by 1:15  PM)   Return General Liver with Santi Dotson MD   [facility] ([facility])    [address]  [address]  [facility] [address] [phone]              Who to contact     Please call your clinic at [phone] to:    Ask questions about your health    Make or cancel appointments    Discuss your medicines    Learn about your test results    Speak to your doctor            Additional Information About Your Visit        [facility] [facility] Information     [employer] gives you secure access to your electronic health record. If you see a primary care provider, you can also send messages to your care team and make appointments. If you have questions, please call your primary care clinic.  If you do not have a primary care provider, please call [phone] and they will assist you.      [employer] is an electronic gateway that provides easy, online access to your medical records. With [employer], you can request a clinic appointment, read your test results, renew a prescription or communicate with your care team.     To access your existing account, please contact your [facility] or call [phone] for assistance.        Care EveryWhere ID     This is your Care EveryWhere ID. This could be used by other organizations to access your [facility] medical records  [id number]         Blood Pressure from Last 3 Encounters:   10/15/18 109/55   09/19/18 112/68   09/15/18 110/66    Weight from Last 3 Encounters:   10/15/18 87.9 kg (193 lb 12.8 oz)   09/19/18 86.6 kg (191 lb)   09/15/18 86.8 kg (191 lb 4.8 oz)              We Performed the Following     OCT Retina Spectralis OU (both eyes)          Today's Medication Changes          These changes are accurate as of 10/31/18  1:38 PM.  If you have any questions, ask your nurse or doctor.               These medicines have changed or have updated prescriptions.        Dose/Directions    lactulose 10 GM/15ML solution    Commonly known as:  CHRONULAC   This may have changed:    - how much to take  - additional instructions   Used for:  Liver cirrhosis secondary to ESTRADA (H)        Dose:  30 g   Take 45 mLs (30 g) by mouth 4 times daily   Quantity:  7200 mL   Refills:  11                Primary Care Provider Office Phone # Fax #    Natalei Mitzi Andrés Russell [phone] [address] [facility] [address]        Equal Access to Services     MINDI RODRIGUEZ : Hadii aad ku hadasho Soomaali, waaxda luqadaha, qaybta kaalmada adeegyada, waxay idiin hayaan adeeg luis miguelaraisabella la'brigitte . So [facility] [phone].    ATENCIÓN: Si habla español, tiene a hanley disposición servicios gratuitos de asistencia lingüística. [facility] [phone].    We comply with applicable federal civil rights laws and [place] laws. We do not discriminate on the basis of race, color, national origin, age, disability, sex, sexual orientation, or gender identity.            Thank you!     Thank you for choosing [facility]  for your care. Our goal is always to provide you with excellent care. Hearing back from our patients is one way we can continue to improve our services. Please take a few minutes to complete the written survey that you may receive in the mail after your visit with us. Thank you!             Your Updated Medication List - Protect others around you: Learn how to safely use, store and throw away your medicines at www.disposemymeds.org.          This list is accurate as of 10/31/18  1:38 PM.  Always use your most recent med list.                   Brand Name Dispense Instructions for use Diagnosis    aspirin 81 MG EC tablet    ASPIRIN LOW DOSE    90 tablet    Take 1 tablet (81 mg) by mouth daily    Type 2 diabetes mellitus without complication, with long-term current use of insulin (H)       BD VIKTORIA U/F 32G X 4 MM   Generic drug:  insulin pen needle     300 each    Use 5 per day    Type 2 diabetes mellitus without complication, with  long-term current use of insulin (H)       blood glucose monitoring lancets     408 each    Use to test blood sugar 4 times daily or as directed.  1 box = 102 lancets    Type II diabetes mellitus (H)       * blood glucose monitoring test strip    ACCU-CHEK EDINSON PLUS    400 each    Use to test blood sugar 4 times daily    Type II diabetes mellitus (H)       * blood glucose monitoring test strip    ACCU-CHEK EDINSON PLUS    300 strip    USE TO TEST BLOOD SUGARS FOUR TIMES DAILY OR AS DIRECTED    Type II diabetes mellitus (H)       capsaicin 0.025 % Crea cream    ZOSTRIX    60 g    To feet 2-3 times daily as needed.    Type II or unspecified type diabetes mellitus with neurological manifestations, not stated as uncontrolled(250.60) (H)       carvedilol 12.5 MG tablet    COREG    180 tablet    TAKE 1 TABLET(12.5 MG) BY MOUTH TWICE DAILY WITH MEALS    Liver cirrhosis secondary to ESTRADA (H), Secondary esophageal varices without bleeding (H)       dapagliflozin 10 MG Tabs tablet    FARXIGA    90 tablet    Take 1 tablet (10 mg) by mouth daily    Type 2 diabetes mellitus with hyperglycemia, with long-term current use of insulin (H)       econazole nitrate 1 % cream     85 g    Apply topically daily To feet and toenails.    Tinea pedis of both feet       erythromycin ophthalmic ointment    ROMYCIN    1 Tube    Place into the right eye 3 times daily    Post-operative state       ferrous sulfate 325 (65 Fe) MG tablet    IRON    90 tablet    Take 1 tablet (325 mg) by mouth 3 times daily (with meals)    Vitamin deficiency, Severe protein-calorie malnutrition (H), Cirrhosis of liver without ascites, unspecified hepatic cirrhosis type (H)       insulin degludec 200 UNIT/ML pen    TRESIBA    100 mL    Take 100 units daily.    Type 2 diabetes mellitus with hyperglycemia, with long-term current use of insulin (H)       lactulose 10 GM/15ML solution    CHRONULAC    7200 mL    Take 45 mLs (30 g) by mouth 4 times daily    Liver cirrhosis  secondary to ESTRADA (H)       NovoLOG FLEXPEN 100 UNIT/ML injection   Generic drug:  insulin aspart     30 mL    INJECT 1 UNIT PER 4 GRAMS OF CARBS AT MEALS AND SNACKS CORRECTION SCALE OF 1 UNITS PER 25 OVER [address] DOSE 75UNITS PER DAY    Type II diabetes mellitus (H)       OLANZapine 2.5 MG tablet    zyPREXA    90 tablet    Take 1 tablet (2.5 mg) by mouth At Bedtime    Insomnia, unspecified type       omeprazole 40 MG capsule    priLOSEC    90 capsule    Take 1 capsule (40 mg) by mouth daily    Gastroesophageal reflux disease, esophagitis presence not specified       potassium chloride SA 20 MEQ CR tablet    K-DUR/KLOR-CON M    90 tablet    Take 1 tablet (20 mEq) by mouth daily    Hypokalemia       pravastatin 20 MG tablet    PRAVACHOL    90 tablet    Take 1 tablet (20 mg) by mouth daily    Hyperlipidemia LDL goal <100       Propylene Glycol-Glycerin 1-0.3 % Soln    ARTIFICIAL TEARS    30 mL    Apply 1 drop to eye every 2 hours (while awake)    Post-operative state       RA VITAMIN B-12 TR 1000 MCG Tbcr   Generic drug:  cyanocobalamin      Take 1,000 mcg by mouth every morning        REFRESH LACRI-LUBE Oint     1 Tube    Apply 1 Application to eye At Bedtime    Post-operative state       rifaximin 550 MG Tabs tablet    XIFAXAN    60 tablet    Take 1 tablet (550 mg) by mouth 2 times daily    Hepatic encephalopathy (H)       sildenafil 20 MG tablet    REVATIO    90 tablet    Take 2-3  Tablets before activity by mouth    ED (erectile dysfunction)       SM ARTIFICIAL TEARS Soln     1 Bottle    Place 1 drop into the right eye every hour as needed Apply at least 4 times daily and as needed for dry eye    Dry eyes       tadalafil 20 MG tablet    CIALIS    30 tablet    Take 1 tablet (20 mg) by mouth daily as needed    Erectile dysfunction, unspecified erectile dysfunction type       tamsulosin 0.4 MG capsule    FLOMAX    90 capsule    Take 1 capsule (0.4 mg) by mouth daily    Benign prostatic hyperplasia with lower urinary  tract symptoms       THERAVITE PO      Take 1 tablet by mouth every morning        UNABLE TO FIND      2 times daily ULTRA MALE ENHANCEMENT POTENCY        VITAMIN D-3 PO      Take 2,000 Units by mouth every morning        * Notice:  This list has 2 medication(s) that are the same as other medications prescribed for you. Read the directions carefully, and ask your doctor or other care provider to review them with you.

## 2018-10-31 NOTE — NURSING NOTE
Chief Complaints and History of Present Illnesses   Patient presents with     Follow Up For     Type 2 diabetes mellitus with moderate nonproliferative retinopathy of both eyes and macular edema     HPI    Affected eye(s):  Both   Symptoms:     Floaters   Itching      Duration:  4 weeks   Frequency:  Constant       Do you have eye pain now?:  No      Comments:  Patient presents for 4wk f/u f Type 2 diabetes mellitus with moderate nonproliferative retinopathy of both eyes and macular edema with injection only LE. Since the last visit the vision has been stable, no pain or discomfort, no redness or tears. The eye was very itchy, improved x's 1 wk. No flashes, persistent floaters. Completed ATS and erythromycin raul. Briseida Clark COT 12:19 PM October 31, 2018

## 2018-11-12 DIAGNOSIS — K76.82 HEPATIC ENCEPHALOPATHY (H): ICD-10-CM

## 2018-11-12 DIAGNOSIS — E11.9 TYPE II DIABETES MELLITUS (H): ICD-10-CM

## 2018-11-12 RX ORDER — RIFAXIMIN 550 MG/1
TABLET ORAL
Qty: 60 TABLET | Refills: 3 | Status: SHIPPED | OUTPATIENT
Start: 2018-11-12 | End: 2019-02-19

## 2018-11-13 RX ORDER — BLOOD SUGAR DIAGNOSTIC
STRIP MISCELLANEOUS
Qty: 150 STRIP | Refills: 11 | Status: SHIPPED | OUTPATIENT
Start: 2018-11-13 | End: 2019-09-19

## 2018-11-21 DIAGNOSIS — E11.9 TYPE II DIABETES MELLITUS (H): ICD-10-CM

## 2018-11-21 RX ORDER — INSULIN ASPART 100 [IU]/ML
INJECTION, SOLUTION INTRAVENOUS; SUBCUTANEOUS
Qty: 30 ML | Refills: 3 | Status: ON HOLD | OUTPATIENT
Start: 2018-11-21 | End: 2019-11-20

## 2018-11-26 DIAGNOSIS — E11.3213 TYPE 2 DIABETES MELLITUS WITH MILD NONPROLIFERATIVE RETINOPATHY OF BOTH EYES AND MACULAR EDEMA, UNSPECIFIED WHETHER LONG TERM INSULIN USE (H): Primary | ICD-10-CM

## 2018-11-28 ENCOUNTER — OFFICE VISIT (OUTPATIENT)
Dept: OPHTHALMOLOGY | Facility: CLINIC | Age: 53
End: 2018-11-28
Attending: OPHTHALMOLOGY
Payer: MEDICARE

## 2018-11-28 DIAGNOSIS — E11.3213 TYPE 2 DIABETES MELLITUS WITH MILD NONPROLIFERATIVE RETINOPATHY OF BOTH EYES AND MACULAR EDEMA, UNSPECIFIED WHETHER LONG TERM INSULIN USE (H): ICD-10-CM

## 2018-11-28 PROCEDURE — G0463 HOSPITAL OUTPT CLINIC VISIT: HCPCS | Mod: ZF

## 2018-11-28 PROCEDURE — 92134 CPTRZ OPH DX IMG PST SGM RTA: CPT | Mod: ZF | Performed by: OPHTHALMOLOGY

## 2018-11-28 ASSESSMENT — TONOMETRY
IOP_METHOD: TONOPEN
OS_IOP_MMHG: 13
OD_IOP_MMHG: 17

## 2018-11-28 ASSESSMENT — EXTERNAL EXAM - RIGHT EYE: OD_EXAM: NORMAL

## 2018-11-28 ASSESSMENT — VISUAL ACUITY
OD_CC: 20/30
OS_CC: 20/25
METHOD: SNELLEN - LINEAR
CORRECTION_TYPE: GLASSES
OS_PH_CC: 20/25
OS_CC+: -2
OD_CC+: +2

## 2018-11-28 ASSESSMENT — REFRACTION_WEARINGRX
OS_CYLINDER: +0.75
OD_SPHERE: -7.25
OD_ADD: +2.00
OD_CYLINDER: +0.75
OD_AXIS: 132
OS_ADD: +2.00
OS_SPHERE: -7.25
OS_AXIS: 040
SPECS_TYPE: PAL

## 2018-11-28 ASSESSMENT — CONF VISUAL FIELD
METHOD: COUNTING FINGERS
OD_NORMAL: 1

## 2018-11-28 ASSESSMENT — EXTERNAL EXAM - LEFT EYE: OS_EXAM: NORMAL

## 2018-11-28 ASSESSMENT — CUP TO DISC RATIO
OS_RATIO: 0.3
OD_RATIO: 0.25

## 2018-11-28 ASSESSMENT — SLIT LAMP EXAM - LIDS
COMMENTS: NORMAL
COMMENTS: SMALL PAPILLOMA ALONG LL MARGIN

## 2018-11-28 NOTE — NURSING NOTE
Chief Complaints and History of Present Illnesses   Patient presents with     Diabetic Retinopathy Follow Up     4 week follow up both eyes     HPI    Affected eye(s):  Both   Symptoms:     No floaters   No flashes   No redness   No tearing   No Dryness         Do you have eye pain now?:  No      Comments:  Pt states vision is the same as last visit. Both eyes are itchy today, relief with drops.  DM2 BS: 107 this morning.  Lab Results       Component                Value               Date                       A1C                      6.6                 08/08/2018                 A1C                      6.5                 06/09/2017                 A1C                      7.8                 10/25/2016                [facility] November 28, 2018 1:20 PM

## 2018-11-28 NOTE — PROGRESS NOTES
CC:  Follow up dme left eye   HPI: Frandy Workman is a  53 year old year-old patient with history of Diabetes mellitus for 24 ys . Patient on insulin.  HBA1c 6.6 08/08/18. Patient was evaluated by dr. Amezquita and sent to the retina clinic for management of Diabetic macular edema     Retinal Imaging:  OCT 11-28-18  RE: no intraretinal fluid   LE: previous dme resolved    fluorescein angiography transits left eye 08/09/18 :  Right eye: diffuse microaneurysms, late macula leakage; few peripheral capillary non perfusion   Left eye: diffuse microaneurysms, late macula leakage; few peripheral capillary non perfusion     Assessment & Plan:    1.  Moderate nonproliferative diabetic retinopathy of both eyes    - Blood pressure (<120/80) and blood glucose (HbA1c <7.0) control discussed with patient. Patient advised  that failure to adequately control each may lead to vision loss. The patient expressed understanding.    2. Diabetic macular edema left eye    - status post avastin x3   - plan to observe given significant improvement, recheck in 3 months            3. Myopia, bilateral  Prescription given last yuval     4. Senile nuclear sclerosis, bilateral  Comment: Not visually significant OU  Plan:  No treatment indicated. Monitor annually.    Planf: follow up in 3 months with dilated fundus exam and oct mac      ~~~~~~~~~~~~~~~~~~~~~~~~~~~~~~~~~~   Complete documentation of historical and exam elements from today's encounter can be found in the full encounter summary report (not reduplicated in this progress note).  I personally obtained the chief complaint(s) and history of present illness.  I confirmed and edited as necessary the review of systems, past medical/surgical history, family history, social history, and examination findings as documented by others; and I examined the patient myself.  I personally reviewed the relevant tests, images, and reports as documented above.  I personally reviewed the ophthalmic test(s)  associated with this encounter, agree with the interpretation(s) as documented by the resident/fellow, and have edited the corresponding report(s) as necessary.   I formulated and edited as necessary the assessment and plan and discussed the findings and management plan with the patient and family    Enriqueta Martin MD   of Ophthalmology.  Retina Service   Department of Ophthalmology and Visual Neurosciences   [facility]  Phone: [phone]   Fax: [phone]

## 2018-11-28 NOTE — MR AVS SNAPSHOT
After Visit Summary   11/28/2018    Frandy Workman    MRN: [id number]           Patient Information     Date Of Birth          1965        Visit Information        Provider Department      11/28/2018 1:15 PM Enriqueta [facility] MD [facility]        Today's Diagnoses     Type 2 diabetes mellitus with mild nonproliferative retinopathy of both eyes and macular edema, unspecified whether long term insulin use (H) - Both Eyes           Follow-ups after your visit        Follow-up notes from your care team     Return in about 3 months (around 2/28/2019) for Dilated exam, OCT Macula.      Your next 10 appointments already scheduled     Dec 10, 2018 10:45 AM CST   US ABDOMEN COMPLETE with UCUS1   [facility] US ([facility])    [address] [address] [phone]           How do I prepare for my exam? (Food and drink instructions) Adults: No eating, smoking, gum chewing or drinking for 8 hours before the exam. You may take medicine with a small sip of water.  Children: * Infants, breast-fed: may have breast milk up to 2 hours before exam. * Infants, formula: may have bottle until 4 hours before exam. * Children 1-5 years: No food or drink for 4 hours before exam. * Children 6 -12 years: No food or drink for 6 hours before exam. * Children over 12 years: No food or drink for 8 hours before exam.  * J Tube Fed: No need to stop feedings.  What should I wear: Wear comfortable clothes.  How long does the exam take: Most ultrasounds take 30 to 60 minutes.  What should I bring: Bring a list of your medicines, including vitamins, minerals and over-the-counter drugs. It is safest to leave personal items at home.  Do I need a :  No  is needed.  What do I need to tell my doctor: Tell your doctor about any allergies you may have.  What should I do after the exam: No restrictions, You may resume normal activities.  What is  this test: An ultrasound uses sound waves to make pictures of the body. Sound waves do not cause pain. The only discomfort may be the pressure of the wand against your skin or full bladder.  Who should I call with questions: If you have any questions, please call the Imaging Department where you will have your exam. Directions, parking instructions, and other information is available on our website, [email or web address].            Dec 28, 2018   Procedure with Santi Dotson MD   [facility] ([facility])    [address] [place] [address]  [address] [facility] [id number] [phone]           Located in the Clinics and Surgery Center at [address] [place] [address].   parking is very convenient and highly recommended.  is a $6 flat rate fee.  Both  and self parkers should enter the main arrival plaza from [facility]; parking attendants will direct you based on your parking preference.            Rgant 10, 2019 12:30 PM CST   (Arrive by 12:15 PM)   Return Visit with Lamin Gonzalez DPM   [facility] Endocrinology ([facility])    [address] [place] [address]  [address] [facility] [id number] [phone]            Feb 11, 2019  1:00 PM CST   (Arrive by 12:45 PM)   RETURN ENDOCRINE with Jennifer Wilcox MD   [facility] Endocrinology ([facility])    [address] [place] [address]  [address] [facility] [id number] [phone]            Feb 28, 2019 12:30 PM CST   RETURN RETINA with Enriqueta Martin MD   [facility] Clinic ([facility])    [address]  [address] [address]  [facility] [address]   [phone]            Mar 18, 2019  1:30 PM CDT   (Arrive by 1:15 PM)   Return Visit with Natalie Russell MD   [facility] Primary Care Clinic ([facility])    [address] [place] [address]  [address] [facility] [address]    [phone]            Apr 01, 2019 12:30 PM CDT   Lab with  LAB   [facility] Lab ([facility])    [address] [facility] [address]  1st Floor  [facility] [address] [phone]            Apr 01, 2019  1:30 PM CDT   (Arrive by 1:15 PM)   Return General Liver with Santi Dotson MD   [facility] Hepatology ([facility])    [address]  [address]  [facility] [address] [phone]              Future tests that were ordered for you today     Open Future Orders        Priority Expected Expires Ordered    OCT Retina Spectralis OU (both eyes) Routine  5/31/2020 11/28/2018            Who to contact     Please call your clinic at [phone] to:    Ask questions about your health    Make or cancel appointments    Discuss your medicines    Learn about your test results    Speak to your doctor            Additional Information About Your Visit        [employer] Information     [employer] gives you secure access to your electronic health record. If you see a primary care provider, you can also send messages to your care team and make appointments. If you have questions, please call your primary care clinic.  If you do not have a primary care provider, please call [phone] and they will assist you.      [employer] is an electronic gateway that provides easy, online access to your medical records. With [employer], you can request a clinic appointment, read your test results, renew a prescription or communicate with your care team.     To access your existing account, please contact your [facility] or call [phone] for assistance.        Care EveryWhere ID     This is your Care EveryWhere ID. This could be used by other organizations to access your [place] medical records  [id number]         Blood Pressure from Last 3 Encounters:   10/15/18 109/55   09/19/18 112/68   09/15/18 110/66    Weight from Last 3 Encounters:   10/15/18 87.9  kg (193 lb 12.8 oz)   09/19/18 86.6 kg (191 lb)   09/15/18 86.8 kg (191 lb 4.8 oz)              We Performed the Following     OCT Retina Spectralis OU (both eyes)          Today's Medication Changes          These changes are accurate as of 11/28/18  2:13 PM.  If you have any questions, ask your nurse or doctor.               These medicines have changed or have updated prescriptions.        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 So [facility] [phone].    ATENCIÓN: Si habla español, tiene a hanley disposición servicios gratuitos de asistencia lingüística. Rl al [phone].    We comply with applicable federal civil rights laws and [place] laws. We do not discriminate on the basis of race, color, national origin, age, disability, sex, sexual orientation, or gender identity.            Thank you!     Thank you for choosing EYE CLINIC  for your care. Our goal is always to provide you with excellent care. Hearing back from our patients is one way we can continue to improve our services. Please take a few minutes to complete the written survey that you may receive in the mail after your visit with us. Thank you!             Your Updated Medication List - Protect others around you: Learn how to safely use, store and throw away your medicines at www.disposemymeds.org.          This list is accurate as of 11/28/18  2:13 PM.  Always  use your most recent med list.                   Brand Name Dispense Instructions for use Diagnosis    aspirin 81 MG EC tablet    ASPIRIN LOW DOSE    90 tablet    Take 1 tablet (81 mg) by mouth daily    Type 2 diabetes mellitus without complication, with long-term current use of insulin (H)       BD VIKTORIA U/F 32G X 4 MM miscellaneous   Generic drug:  insulin pen needle     300 each    Use 5 per day    Type 2 diabetes mellitus without complication, with long-term current use of insulin (H)       blood glucose monitoring lancets     408 each    Use to test blood sugar 4 times daily or as directed.  1 box = 102 lancets    Type II diabetes mellitus (H)       * blood glucose monitoring test strip    ACCU-CHEK EDINSON PLUS    400 each    Use to test blood sugar 4 times daily    Type II diabetes mellitus (H)       * blood glucose monitoring test strip    ACCU-CHEK EDINSON PLUS    300 strip    USE TO TEST BLOOD SUGARS FOUR TIMES DAILY OR AS DIRECTED    Type II diabetes mellitus (H)       * ACCU-CHEK EDINSON PLUS test strip   Generic drug:  blood glucose monitoring     150 strip    USE TO TEST BLOOD SUGARS FOUR TIMES DAILY OR AS DIRECTED    Type II diabetes mellitus (H)       capsaicin 0.025 % external cream    ZOSTRIX    60 g    To feet 2-3 times daily as needed.    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    Tinea pedis of both feet       erythromycin 5 MG/GM  ophthalmic ointment    ROMYCIN    1 Tube    Place into the right eye 3 times daily    Post-operative state       ferrous sulfate 325 (65 Fe) MG tablet    FEROSUL    90 tablet    Take 1 tablet (325 mg) by mouth 3 times daily (with meals)    Vitamin deficiency, Severe protein-calorie malnutrition (H), Cirrhosis of liver without ascites, unspecified hepatic cirrhosis type (H)       insulin degludec 200 UNIT/ML pen    TRESIBA    100 mL    Take 100 units daily.    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 CORRECTION SCALE OF 1 UNITS PER 25 OVER [address]. A[address] DOSE 75 UNITERS PER DAY    Type II diabetes mellitus (H)       OLANZapine 2.5 MG tablet    zyPREXA    90 tablet    Take 1 tablet (2.5 mg) by mouth At Bedtime    Insomnia, unspecified type       omeprazole 40 MG DR capsule    priLOSEC    90 capsule    Take 1 capsule (40 mg) by mouth daily    Gastroesophageal reflux disease, esophagitis presence not specified       potassium chloride ER 20 MEQ CR tablet    K-DUR/KLOR-CON M    90 tablet    Take 1 tablet (20 mEq) by mouth daily    Hypokalemia       pravastatin 20 MG tablet    PRAVACHOL    90 tablet    Take 1 tablet (20 mg) by mouth daily    Hyperlipidemia LDL goal <100       Propylene Glycol-Glycerin 1-0.3 % Soln    ARTIFICIAL TEARS    30 mL    Apply 1 drop to eye every 2 hours (while awake)    Post-operative state       RA VITAMIN B-12 TR 1000 MCG CR tablet   Generic drug:  vitamin B-12      Take 1,000 mcg by mouth every morning        REFRESH LACRI-LUBE Oint     1 Tube    Apply 1 Application to eye At Bedtime    Post-operative state       * rifaximin 550 MG  Tabs tablet    XIFAXAN    60 tablet    Take 1 tablet (550 mg) by mouth 2 times daily    Hepatic encephalopathy (H)       * XIFAXAN 550 MG Tabs tablet   Generic drug:  rifaximin     60 tablet    TAKE 1 TABLET(550 MG) BY MOUTH TWICE DAILY    Hepatic encephalopathy (H)       sildenafil 20 MG tablet    REVATIO    90 tablet    Take 2-3  Tablets before activity by mouth    ED (erectile dysfunction)       SM ARTIFICIAL TEARS Soln     1 Bottle    Place 1 drop into the right eye every hour as needed Apply at least 4 times daily and as needed for dry eye    Dry eyes       tadalafil 20 MG tablet    CIALIS    30 tablet    Take 1 tablet (20 mg) by mouth daily as needed    Erectile dysfunction, unspecified erectile dysfunction type       tamsulosin 0.4 MG capsule    FLOMAX    90 capsule    Take 1 capsule (0.4 mg) by mouth daily    Benign prostatic hyperplasia with lower urinary tract symptoms       THERAVITE PO      Take 1 tablet by mouth every morning        UNABLE TO FIND      2 times daily ULTRA MALE ENHANCEMENT POTENCY        VITAMIN D-3 PO      Take 2,000 Units by mouth every morning        * Notice:  This list has 5 medication(s) that are the same as other medications prescribed for you. Read the directions carefully, and ask your doctor or other care provider to review them with you.

## 2018-11-28 NOTE — PROGRESS NOTES
CC:  Follow up dme left eye     HPI: Frandy Workman is a  53 year old year-old patient with history of Diabetes mellitus for 24 ys . Patient on insulin.  HBA1c 6.6 08/08/18. Patient was evaluated by dr. Amezquita and sent to the retina clinic for management of Diabetic macular edema     Retinal Imaging:  OCT 11/28/18  RE: no intraretinal fluid   LE: previous dme resolved    fluorescein angiography transits left eye 08/09/18 :  Right eye: diffuse microaneurysms, late macula leakage; few peripheral capillary non perfusion   Left eye: diffuse microaneurysms, late macula leakage; few peripheral capillary non perfusion     Assessment & Plan:  1.  Moderate nonproliferative diabetic retinopathy of both eyes    - Blood pressure (<120/80) and blood glucose (HbA1c <7.0) control discussed with patient. Patient advised  that failure to adequately control each may lead to vision loss. The patient expressed understanding.    2. Diabetic macular edema left eye    - status post avastin x3   - plan to observe given significant improvement, recheck in 1 month            3. Myopia, bilateral  Prescription given last yuval     4. Senile nuclear sclerosis, bilateral  Comment: Not visually significant OU  Plan:  No treatment indicated. Monitor annually.    Planf: follow up 3 months OCT macula    Abdias Patiño M.D.  PGY-3, Ophthalmology      ~~~~~~~~~~~~~~~~~~~~~~~~~~~~~~~~~~   Complete documentation of historical and exam elements from today's encounter can be found in the full encounter summary report (not reduplicated in this progress note).  I personally obtained the chief complaint(s) and history of present illness.  I confirmed and edited as necessary the review of systems, past medical/surgical history, family history, social history, and examination findings as documented by others; and I examined the patient myself.  I personally reviewed the relevant tests, images, and reports as documented above.  I personally reviewed the  ophthalmic test(s) associated with this encounter, agree with the interpretation(s) as documented by the resident/fellow, and have edited the corresponding report(s) as necessary.   I formulated and edited as necessary the assessment and plan and discussed the findings and management plan with the patient and family    Enriqueta Martin MD   of Ophthalmology.  Retina Service   Department of Ophthalmology and Visual Neurosciences   [facility]  Phone: [phone]   Fax: [phone]

## 2018-12-03 RX ORDER — MINERAL OIL, PETROLATUM 425; 568 MG/G; MG/G
1 OINTMENT OPHTHALMIC AT BEDTIME
Qty: 1 TUBE | Refills: 3 | OUTPATIENT
Start: 2018-12-03

## 2018-12-10 ENCOUNTER — ANCILLARY PROCEDURE (OUTPATIENT)
Dept: ULTRASOUND IMAGING | Facility: CLINIC | Age: 53
End: 2018-12-10
Attending: INTERNAL MEDICINE
Payer: MEDICARE

## 2018-12-10 DIAGNOSIS — K74.69 OTHER CIRRHOSIS OF LIVER (H): ICD-10-CM

## 2018-12-10 LAB — RADIOLOGIST FLAGS: NORMAL

## 2018-12-11 DIAGNOSIS — K75.81 LIVER CIRRHOSIS SECONDARY TO NASH (H): ICD-10-CM

## 2018-12-11 DIAGNOSIS — R16.0 LIVER MASS: Primary | ICD-10-CM

## 2018-12-11 DIAGNOSIS — K74.60 LIVER CIRRHOSIS SECONDARY TO NASH (H): ICD-10-CM

## 2018-12-12 ENCOUNTER — TELEPHONE (OUTPATIENT)
Dept: GASTROENTEROLOGY | Facility: CLINIC | Age: 53
End: 2018-12-12

## 2018-12-13 DIAGNOSIS — E11.9 TYPE II DIABETES MELLITUS (H): ICD-10-CM

## 2018-12-21 ENCOUNTER — TELEPHONE (OUTPATIENT)
Dept: GASTROENTEROLOGY | Facility: CLINIC | Age: 53
End: 2018-12-21

## 2018-12-21 NOTE — TELEPHONE ENCOUNTER
"    Patient scheduled for:  [x] EGD  [] Colonoscopy  [] EUS  [] Flex Sig   [] Other:     Indication for procedure. [] Screening   [x] Esoph Varices Surveillance    Referring Provider. Lizbet    : [x] N/A   [] Yes:  Language /  ID:     Arrival time verified: Fri; 12/28/18; 0815    Facility location verified:   [][facility] - [address] [facility], 1st Floor, [address]  [x][address] [facility], 5th floor     Instructions given regarding prep and procedure:     Prep Type:   []Golytely eRx: ;  [] MoviPrep: , [] MiraLax: , [] Other:   [x]NPO /p MN, No solid food /p 2200 the night before    Are you taking any anticoagulants or blood thinners: []None [x] ASA 81mg [] Warfarin  [] Warfarin + Lovenox bridge [] Plavix [] Effient [] Eliquis  [] Xarelto  [] Brilinta   [] Other    LAST anticoagulant dose: Date/Time: Will continue without interruption  INR: N/A    Instructions given: [x] Rec d & Read   [] Reviewed   [] Resent via [employer]     [] Resent via eMail (see below)     Electronic implanted devices: [x] No  [] IPG  []  ICD  []  LVAD  []     Pre procedure teaching completed: [x] Yes - Reviewed, [] No,     Transportation from procedure: [x] Yes Art Saunders, [] Pending , [] No     H&P / Pre op physical completed: [x] N/A, [] Complete, Date , [] Scheduled, Date , [] No,     Confirmed pt will have  so that he may have IV sedation. \"He is flying in from [place].\"    Mehnaz Cadet RN  [facility]   "

## 2018-12-23 ENCOUNTER — ANCILLARY PROCEDURE (OUTPATIENT)
Dept: MRI IMAGING | Facility: CLINIC | Age: 53
End: 2018-12-23
Attending: INTERNAL MEDICINE
Payer: MEDICARE

## 2018-12-23 DIAGNOSIS — K75.81 LIVER CIRRHOSIS SECONDARY TO NASH (H): ICD-10-CM

## 2018-12-23 DIAGNOSIS — K74.60 LIVER CIRRHOSIS SECONDARY TO NASH (H): ICD-10-CM

## 2018-12-23 DIAGNOSIS — R16.0 LIVER MASS: ICD-10-CM

## 2018-12-23 RX ORDER — GADOBUTROL 604.72 MG/ML
10 INJECTION INTRAVENOUS ONCE
Status: COMPLETED | OUTPATIENT
Start: 2018-12-23 | End: 2018-12-23

## 2018-12-23 RX ADMIN — GADOBUTROL 9 ML: 604.72 INJECTION INTRAVENOUS at 13:06

## 2018-12-23 NOTE — DISCHARGE INSTRUCTIONS
MRI Contrast Discharge Instructions    The IV contrast you received today will pass out of your body in your  urine. This will happen in the next 24 hours. You will not feel this process.  Your urine will not change color.    Drink at least 4 extra glasses of water or juice today (unless your doctor  has restricted your fluids). This reduces the stress on your kidneys.  You may take your regular medicines.    If you are on dialysis: It is best to have dialysis today.    If you have a reaction: Most reactions happen right away. If you have  any new symptoms after leaving the hospital (such as hives or swelling),  call your hospital at the correct number below. Or call your family doctor.  If you have breathing distress or wheezing, call 911.    Special instructions: ***    I have read and understand the above information.    Signature:______________________________________ Date:___________    Staff:__________________________________________ Date:___________     Time:__________    [facility] Departments:    ___[place]: [phone]  ___[facility]: [phone]  ___[place]: [phone] ___[facility]: [phone]  ___[facility]: [phone]  ___[facility]: [phone]  ___[phone]  ___[facility]: [phone]  ___[phone]

## 2018-12-26 ENCOUNTER — TELEPHONE (OUTPATIENT)
Dept: GASTROENTEROLOGY | Facility: CLINIC | Age: 53
End: 2018-12-26

## 2018-12-26 DIAGNOSIS — C22.0 HEPATOCELLULAR CARCINOMA (H): Primary | ICD-10-CM

## 2018-12-26 DIAGNOSIS — K75.81 LIVER CIRRHOSIS SECONDARY TO NASH (H): ICD-10-CM

## 2018-12-26 DIAGNOSIS — K74.60 LIVER CIRRHOSIS SECONDARY TO NASH (H): ICD-10-CM

## 2018-12-26 NOTE — TELEPHONE ENCOUNTER
Called patient to review MRI liver 12/23/18 results.    MRI liver shows two lesions consistent with HCC- 3.1cm lesion in seg IVB and 1.1cm lesion in seg 3.  PVT but no evidence of tumor invasion.    Patient will need staging studies (CT chest, bone scan), IR referral for loco-regional therapy then liver transplant evaluation.    Will review images at liver tumor conference this week.    Patient will need additional blood work including AFP.    Will answer additional questions at upcoming EGD this Friday.    Santi Banuelos MD  Hepatology  [facility]

## 2018-12-27 DIAGNOSIS — K74.60 LIVER CIRRHOSIS SECONDARY TO NASH (H): ICD-10-CM

## 2018-12-27 DIAGNOSIS — K75.81 LIVER CIRRHOSIS SECONDARY TO NASH (H): ICD-10-CM

## 2018-12-27 RX ORDER — LACTULOSE 10 G/15ML
30 SOLUTION ORAL 4 TIMES DAILY
Qty: 5000 ML | Refills: 11 | Status: ON HOLD | OUTPATIENT
Start: 2018-12-27 | End: 2019-11-20

## 2018-12-28 ENCOUNTER — HOSPITAL ENCOUNTER (OUTPATIENT)
Facility: AMBULATORY SURGERY CENTER | Age: 53
End: 2018-12-28
Attending: INTERNAL MEDICINE
Payer: MEDICARE

## 2018-12-28 ENCOUNTER — DOCUMENTATION ONLY (OUTPATIENT)
Dept: TRANSPLANT | Facility: CLINIC | Age: 53
End: 2018-12-28

## 2018-12-28 ENCOUNTER — TELEPHONE (OUTPATIENT)
Dept: ONCOLOGY | Facility: CLINIC | Age: 53
End: 2018-12-28

## 2018-12-28 VITALS
WEIGHT: 193.12 LBS | HEIGHT: 69 IN | TEMPERATURE: 97.3 F | HEART RATE: 69 BPM | BODY MASS INDEX: 28.6 KG/M2 | OXYGEN SATURATION: 96 % | DIASTOLIC BLOOD PRESSURE: 52 MMHG | SYSTOLIC BLOOD PRESSURE: 89 MMHG | RESPIRATION RATE: 16 BRPM

## 2018-12-28 DIAGNOSIS — K74.60 CIRRHOSIS OF LIVER WITHOUT ASCITES, UNSPECIFIED HEPATIC CIRRHOSIS TYPE (H): ICD-10-CM

## 2018-12-28 DIAGNOSIS — E43 SEVERE PROTEIN-CALORIE MALNUTRITION (H): ICD-10-CM

## 2018-12-28 DIAGNOSIS — K75.81 LIVER CIRRHOSIS SECONDARY TO NASH (H): ICD-10-CM

## 2018-12-28 DIAGNOSIS — E56.9 VITAMIN DEFICIENCY: ICD-10-CM

## 2018-12-28 DIAGNOSIS — K74.60 LIVER CIRRHOSIS SECONDARY TO NASH (H): ICD-10-CM

## 2018-12-28 DIAGNOSIS — C22.0 HEPATOCELLULAR CARCINOMA (H): ICD-10-CM

## 2018-12-28 LAB
AFP SERPL-MCNC: 5.2 UG/L (ref 0–8)
ALBUMIN SERPL-MCNC: 3.2 G/DL (ref 3.4–5)
ALP SERPL-CCNC: 145 U/L (ref 40–150)
ALT SERPL W P-5'-P-CCNC: 56 U/L (ref 0–70)
ANION GAP SERPL CALCULATED.3IONS-SCNC: 7 MMOL/L (ref 3–14)
AST SERPL W P-5'-P-CCNC: 55 U/L (ref 0–45)
BILIRUB DIRECT SERPL-MCNC: 0.4 MG/DL (ref 0–0.2)
BILIRUB SERPL-MCNC: 1 MG/DL (ref 0.2–1.3)
BUN SERPL-MCNC: 21 MG/DL (ref 7–30)
CALCIUM SERPL-MCNC: 8.8 MG/DL (ref 8.5–10.1)
CHLORIDE SERPL-SCNC: 113 MMOL/L (ref 94–109)
CO2 SERPL-SCNC: 24 MMOL/L (ref 20–32)
CREAT SERPL-MCNC: 1.18 MG/DL (ref 0.66–1.25)
ERYTHROCYTE [DISTWIDTH] IN BLOOD BY AUTOMATED COUNT: 14.6 % (ref 10–15)
FERRITIN SERPL-MCNC: 90 NG/ML (ref 26–388)
GFR SERPL CREATININE-BSD FRML MDRD: 69 ML/MIN/{1.73_M2}
GLUCOSE BLDC GLUCOMTR-MCNC: 189 MG/DL (ref 70–99)
GLUCOSE SERPL-MCNC: 114 MG/DL (ref 70–99)
HCT VFR BLD AUTO: 42 % (ref 40–53)
HGB BLD-MCNC: 14.4 G/DL (ref 13.3–17.7)
INR PPP: 1.3 (ref 0.86–1.14)
MCH RBC QN AUTO: 35.6 PG (ref 26.5–33)
MCHC RBC AUTO-ENTMCNC: 34.3 G/DL (ref 31.5–36.5)
MCV RBC AUTO: 104 FL (ref 78–100)
PLATELET # BLD AUTO: 37 10E9/L (ref 150–450)
POTASSIUM SERPL-SCNC: 4.6 MMOL/L (ref 3.4–5.3)
PROT SERPL-MCNC: 7.7 G/DL (ref 6.8–8.8)
RBC # BLD AUTO: 4.04 10E12/L (ref 4.4–5.9)
SODIUM SERPL-SCNC: 145 MMOL/L (ref 133–144)
UPPER GI ENDOSCOPY: NORMAL
VIT B12 SERPL-MCNC: 1532 PG/ML (ref 193–986)
WBC # BLD AUTO: 3.4 10E9/L (ref 4–11)

## 2018-12-28 PROCEDURE — 82105 ALPHA-FETOPROTEIN SERUM: CPT | Performed by: INTERNAL MEDICINE

## 2018-12-28 RX ORDER — LIDOCAINE 40 MG/G
CREAM TOPICAL
Status: DISCONTINUED | OUTPATIENT
Start: 2018-12-28 | End: 2018-12-28 | Stop reason: HOSPADM

## 2018-12-28 RX ORDER — SIMETHICONE
LIQUID (ML) MISCELLANEOUS PRN
Status: DISCONTINUED | OUTPATIENT
Start: 2018-12-28 | End: 2018-12-28 | Stop reason: HOSPADM

## 2018-12-28 RX ORDER — ONDANSETRON 2 MG/ML
4 INJECTION INTRAMUSCULAR; INTRAVENOUS EVERY 6 HOURS PRN
Status: DISCONTINUED | OUTPATIENT
Start: 2018-12-28 | End: 2018-12-29 | Stop reason: HOSPADM

## 2018-12-28 RX ORDER — FENTANYL CITRATE 50 UG/ML
INJECTION, SOLUTION INTRAMUSCULAR; INTRAVENOUS PRN
Status: DISCONTINUED | OUTPATIENT
Start: 2018-12-28 | End: 2018-12-28 | Stop reason: HOSPADM

## 2018-12-28 RX ORDER — ONDANSETRON 2 MG/ML
4 INJECTION INTRAMUSCULAR; INTRAVENOUS
Status: DISCONTINUED | OUTPATIENT
Start: 2018-12-28 | End: 2018-12-28 | Stop reason: HOSPADM

## 2018-12-28 RX ORDER — FLUMAZENIL 0.1 MG/ML
0.2 INJECTION, SOLUTION INTRAVENOUS
Status: ACTIVE | OUTPATIENT
Start: 2018-12-28 | End: 2018-12-28

## 2018-12-28 RX ORDER — NALOXONE HYDROCHLORIDE 0.4 MG/ML
.1-.4 INJECTION, SOLUTION INTRAMUSCULAR; INTRAVENOUS; SUBCUTANEOUS
Status: DISCONTINUED | OUTPATIENT
Start: 2018-12-28 | End: 2018-12-29 | Stop reason: HOSPADM

## 2018-12-28 RX ORDER — ONDANSETRON 4 MG/1
4 TABLET, ORALLY DISINTEGRATING ORAL EVERY 6 HOURS PRN
Status: DISCONTINUED | OUTPATIENT
Start: 2018-12-28 | End: 2018-12-29 | Stop reason: HOSPADM

## 2018-12-28 ASSESSMENT — MIFFLIN-ST. JEOR: SCORE: 1706.37

## 2018-12-28 NOTE — TELEPHONE ENCOUNTER
Called pt and left a VM on his cell phone.   Informed him that I'm calling to f/u on a referral from Dr. Banuelos regarding treatment.     Inquired on which dates would work for him.   Left direct line for him to call me back.     Rani Valdez RN, BSN  Interventional Radiology Nurse Coordinator   Phone: [phone]

## 2018-12-28 NOTE — Clinical Note
Please start transplant evaluation episodeMary-just making sure you are reaching out for LDT.ThanksLynn

## 2018-12-28 NOTE — PROGRESS NOTES
Frandy Workman discussed at tumor conference on 2018    MRI 2018  Imagina 1.1cm  5b  3.1 cm    Recommendations:  Start transplant evaluation  Staging  IR for LDT      ARMAND CamaraN, RN  Liver transplant coordinator  [phone] Office

## 2018-12-28 NOTE — DISCHARGE INSTRUCTIONS
Discharge Instructions after Upper Endoscopy (EGD)       Activity and Diet   You were given medicine for pain. You may be dizzy or sleepy.   For 24 hours:     Do not drive or use heavy equipment.     Do not make important decisions.     Do not drink any alcohol.   ___ You may return to your regular diet.       Discomfort   You may have a sore throat for 2 to 3 days. It may help to:     Avoid hot liquids for 24 hours.     Use sore throat lozenges.     Gargle as needed with salt water up to 4 times a day. Mix 1 cup of warm water with 1 teaspoon of salt. Do not swallow.   ___ Your esophagus was dilated (opened) or banded during the exam:     Drink only cool liquids for the rest of the day. Eat a soft diet for the next few days.     You may have a sore chest for 2 to 3 days.       You may take Tylenol (acetaminophen) for pain unless your doctor has told you not to.       Do not take aspirin or ibuprofen (Advil, Motrin) or other NSAIDS (anti-inflammatory drugs) for ___ days.       Follow-up   ___ We took small tissue samples for study. If you do not have a follow-up visit scheduled, call your provider s office in 2 weeks for the results.       Other instructions________________________________________________________       When to call us:   Problems are rare. Call right away if you have:     Unusual throat pain or trouble swallowing     Unusual pain in belly or chest that is not relieved by belching or passing air     Black stools (tar-like looking bowel movement)     Temperature above 100.6  F. (37.5  C).       If you vomit blood or have severe pain, go to an emergency room.       If you have questions, call:   Monday to Friday, 7 a.m. to 4:30 p.m.: Endoscopy: [phone] (We may have to call you back)       After hours: Hospital: [phone] (Ask for the GI fellow on call)

## 2018-12-31 ENCOUNTER — TELEPHONE (OUTPATIENT)
Dept: ONCOLOGY | Facility: CLINIC | Age: 53
End: 2018-12-31

## 2018-12-31 ENCOUNTER — REFERRAL (OUTPATIENT)
Dept: TRANSPLANT | Facility: CLINIC | Age: 53
End: 2018-12-31

## 2018-12-31 VITALS — WEIGHT: 190 LBS | HEIGHT: 69 IN | BODY MASS INDEX: 28.14 KG/M2

## 2018-12-31 DIAGNOSIS — C22.0 HCC (HEPATOCELLULAR CARCINOMA) (H): Primary | ICD-10-CM

## 2018-12-31 DIAGNOSIS — K74.60 CIRRHOSIS (H): ICD-10-CM

## 2018-12-31 DIAGNOSIS — M85.88 OTHER SPECIFIED DISORDERS OF BONE DENSITY AND STRUCTURE, OTHER SITE: ICD-10-CM

## 2018-12-31 DIAGNOSIS — Z11.59 ENCOUNTER FOR SCREENING FOR OTHER VIRAL DISEASES: ICD-10-CM

## 2018-12-31 DIAGNOSIS — Z79.899 OTHER LONG TERM (CURRENT) DRUG THERAPY: ICD-10-CM

## 2018-12-31 DIAGNOSIS — K76.9 LIVER DISEASE: ICD-10-CM

## 2018-12-31 DIAGNOSIS — Z12.5 ENCOUNTER FOR SCREENING FOR MALIGNANT NEOPLASM OF PROSTATE: ICD-10-CM

## 2018-12-31 DIAGNOSIS — K74.60 CIRRHOSIS OF LIVER (H): Primary | ICD-10-CM

## 2018-12-31 ASSESSMENT — MIFFLIN-ST. JEOR: SCORE: 1692.21

## 2018-12-31 NOTE — TELEPHONE ENCOUNTER
Patient was asked the following questions during liver intake call.     Referring Provider: See's Dr Banuelos at [facility].   Referring Diagnosis: Cirrhosis   PCP: Dr Natalie Bowen     1)Do you know why you have liver disease: No             If Alcoholic Cirrhosis is present when was your last drink:1996             Have you ever been through treatment for alcohol: No  2) Presence of Ascites: No   3) Presence of Hepatic Encephalopathy: Yes Medications: Yes  4) History of GI Bleeding: No  5) EGD: Yes Where: 2016 Records: [facility], [place]  6) Colonoscopy: Yes in 2015 Where: [facility], [place]  7) MELD Score: 11  8) Insurance information: Medicare      Policy sahni: Self      Subscriber/policy/ID number:[id number]      Group Number:     Referral intake process completed.  Patient is aware that after financial approval is received, medical records will be requested.   Patient confirmed for a callback from transplant coordinator on 1/10/2019.  Tentative evaluation date TBD.  Confirmed coordinator will discuss evaluation process in more detail at the time of their call.   Patient is aware of the need to arrange age appropriate cancer screening, vaccinations, and dental care.  Reminded patient to complete questionnaire, complete medical records release, and review packet prior to evaluation visit .  Assessed patient for special needs (ie--wheelchair, assistance, guardian, and ): No  Patient instructed to call [phone] with questions.     ARMAND Evans, LPN   Solid Organ Transplant

## 2018-12-31 NOTE — LETTER
Frandy Workman  [address] [address]      Dear Frandy,    Thank you for your interest in the Transplant Center at [facility]. We look forward to being a part of your care team and assisting you through the transplant process.    As we discussed, your transplant coordinator is Pippa Silva (Liver).  You may call your coordinator at any time with questions or concerns call [phone].    Please complete the following.    1. Fill out and return the enclosed forms    Authorization for Electronic Communication    Authorization to Discuss Protected Health Information    Authorization for Release of Protected Health Information    2. Sign up for:    Nextlyt, access to your electronic medical record (see enclosed pamphlet)    [email or web address], a transplant education website    You can use these tools to learn more about your transplant, communicate with your care team, and track your medical details      Sincerely,  Solid Organ Transplant  [facility]    cc: Referring Physician and PCP

## 2018-12-31 NOTE — TELEPHONE ENCOUNTER
New consult on HCC  Previsit call to pt.     Informed him that I am reaching out to see what availability he may have to come in for consult for liver cancer treatment.     He states that Mondays are the best for him in which we will schedule for Mon 1/7 @ 130pm with Dr. Scott.     He verbalized undestanding.     Rani Valdez RN, BSN  Interventional Radiology Nurse Coordinator   Phone: [phone]

## 2019-01-02 ENCOUNTER — TELEPHONE (OUTPATIENT)
Dept: ONCOLOGY | Facility: CLINIC | Age: 54
End: 2019-01-02

## 2019-01-02 ENCOUNTER — HOSPITAL ENCOUNTER (OUTPATIENT)
Facility: CLINIC | Age: 54
End: 2019-01-02
Payer: MEDICARE

## 2019-01-02 NOTE — TELEPHONE ENCOUNTER
54yo with hx cirrhosis and new liver lesions followed by Dr. Banuelos referred for transplant evaluation. Pt does not drink alcohol (last 1996), and denies hx or current drug use or smoking. Pt had been listed for liver transplant at [facility], but reports he was removed in June 2017 as he was too well. Pt does not work, he has been on disability due to his health since 2014. Pt reports he is independent. Pt has limited local support but has friends who live in [place] who are able to travel here to assist pt as needed.     MELD: 11, see EPIC  Imaging: MRI 12/23/18, [facility]  Ascites: off diuretics x 4 months, no issues  HE: controled with lactulose and xifaxan   GIB: no hx   EGD: 12/28/18 at [facility], [facility]  Colonoscopy: last  May 2016 at [facility], see Care Everywhere    Medical and Surgical Hx: Snap Shot up to date    Plan:   IR referral and staging per tumor conference note, orders in place. Will route to  for scheduling.   Oncology referral for new HCC.    Plan to review plan for transplant evaluation with Dr. Banuelos.     Pt verbalized understanding and comfort with above plan.

## 2019-01-02 NOTE — TELEPHONE ENCOUNTER
ONCOLOGY INTAKE: Records Information      APPT INFORMATION: 1/14/19 at 11:15AM  Referring provider:  Dr. Santi Dotson  Referring provider s clinic:  [facility]  Reason for visit/diagnosis:  Cirrhosis of Liver, New HCC    Were the records received with the referral (via [employer])? In Epic    Has patient been seen for any external appt for this diagnosis (enter clinic/location)? No - per Cristin at clinic, no outside records. She will reach out to the pt to confirm this appt.

## 2019-01-03 ASSESSMENT — ENCOUNTER SYMPTOMS
WEIGHT LOSS: 0
HOARSE VOICE: 1
NECK MASS: 0
ALTERED TEMPERATURE REGULATION: 1
POOR WOUND HEALING: 0
INCREASED ENERGY: 0
SORE THROAT: 0
FATIGUE: 1
CHILLS: 0
NIGHT SWEATS: 1
EYE IRRITATION: 1
NAIL CHANGES: 0
POLYDIPSIA: 1
WEIGHT GAIN: 1
BRUISES/BLEEDS EASILY: 1
DOUBLE VISION: 0
EYE REDNESS: 0
SINUS PAIN: 0
DECREASED APPETITE: 1
POLYPHAGIA: 1
FEVER: 0
SKIN CHANGES: 0
EYE WATERING: 0
TASTE DISTURBANCE: 0
SMELL DISTURBANCE: 0
EYE PAIN: 1
HALLUCINATIONS: 0
SINUS CONGESTION: 1
SWOLLEN GLANDS: 0
TROUBLE SWALLOWING: 0

## 2019-01-04 ENCOUNTER — HOSPITAL ENCOUNTER (OUTPATIENT)
Dept: NUCLEAR MEDICINE | Facility: CLINIC | Age: 54
Setting detail: NUCLEAR MEDICINE
Discharge: HOME | End: 2019-01-04
Attending: INTERNAL MEDICINE | Admitting: INTERNAL MEDICINE
Payer: MEDICARE

## 2019-01-04 ENCOUNTER — HOSPITAL ENCOUNTER (OUTPATIENT)
Dept: CT IMAGING | Facility: CLINIC | Age: 54
Discharge: HOME | End: 2019-01-04
Attending: INTERNAL MEDICINE | Admitting: INTERNAL MEDICINE
Payer: MEDICARE

## 2019-01-04 ENCOUNTER — HOSPITAL ENCOUNTER (OUTPATIENT)
Dept: NUCLEAR MEDICINE | Facility: CLINIC | Age: 54
Setting detail: NUCLEAR MEDICINE
End: 2019-01-04
Attending: INTERNAL MEDICINE
Payer: MEDICARE

## 2019-01-04 DIAGNOSIS — C22.0 HEPATOCELLULAR CARCINOMA (H): ICD-10-CM

## 2019-01-04 PROCEDURE — 71250 CT THORAX DX C-: CPT

## 2019-01-04 PROCEDURE — 34300033 ZZH RX 343: Performed by: INTERNAL MEDICINE

## 2019-01-04 PROCEDURE — 78306 BONE IMAGING WHOLE BODY: CPT

## 2019-01-04 PROCEDURE — A9503 TC99M MEDRONATE: HCPCS | Performed by: INTERNAL MEDICINE

## 2019-01-04 RX ORDER — TC 99M MEDRONATE 20 MG/10ML
20-30 INJECTION, POWDER, LYOPHILIZED, FOR SOLUTION INTRAVENOUS ONCE
Status: COMPLETED | OUTPATIENT
Start: 2019-01-04 | End: 2019-01-04

## 2019-01-04 RX ADMIN — TC 99M MEDRONATE 25.1 MCI.: 20 INJECTION, POWDER, LYOPHILIZED, FOR SOLUTION INTRAVENOUS at 10:07

## 2019-01-04 NOTE — TELEPHONE ENCOUNTER
FUTURE VISIT INFORMATION      FUTURE VISIT INFORMATION:    Date: 1/7    Time: 140    Location: Vascular  REFERRAL INFORMATION:    Referring provider:   Dr. Banuelos    Referring providers clinic:   Radiology    Reason for visit/diagnosis  HCC    RECORDS REQUESTED FROM:       Clinic name Comments Records Status Imaging Status                                           All Records Internal

## 2019-01-07 ENCOUNTER — OFFICE VISIT (OUTPATIENT)
Dept: VASCULAR SURGERY | Facility: CLINIC | Age: 54
End: 2019-01-07
Payer: MEDICARE

## 2019-01-07 ENCOUNTER — PRE VISIT (OUTPATIENT)
Dept: VASCULAR SURGERY | Facility: CLINIC | Age: 54
End: 2019-01-07

## 2019-01-07 VITALS — OXYGEN SATURATION: 97 % | DIASTOLIC BLOOD PRESSURE: 77 MMHG | SYSTOLIC BLOOD PRESSURE: 124 MMHG | HEART RATE: 63 BPM

## 2019-01-07 DIAGNOSIS — C22.0 HEPATOCELLULAR CARCINOMA (H): Primary | ICD-10-CM

## 2019-01-07 ASSESSMENT — PAIN SCALES - GENERAL: PAINLEVEL: NO PAIN (0)

## 2019-01-07 NOTE — LETTER
1/7/2019       RE: Frandy Workman  [address] [facility] [address]     Dear Colleague,    Thank you for referring your patient, Frandy Workman, to the [facility]ULAR [facility] at [facility]. Please see a copy of my visit note below.              Interventional Radiology Clinic Visit    Date of this visit: 1/11/2019    Frandy Workman  is referred by Dr. Banuelos for treatment recommendations. The patient requires evaluation for diagnosis of multifocal HCC     Primary Physician: Natalie Chun        History Of Present Illness:    Frandy Workman is a 53 year old male who presents with diagnosis of HCC on a background of ESTRADA cirrhosis.  The patient was initial diagnosed in 2013 and is under the care of Dr. Banuelos.  He was found to have HCC on routine screening.  He has had mild HE, but denies jaundice or variceal bleeding history.  He has had ascites, which is controlled with medications.  He has no cancer specific complaints.       Review of Systems:    General: Negative for recent fever.  Skin: Negative for jaundice.  Eyes: Negative for jaundice.  Respiratory: Negative for shortness of breath.  Cardiovascular: Negative for chest pain.  Gastrointestinal: Negative for abdominal pain or swelling, nausea, vomiting, or diarrhea.  Musculoskeletal: Negative for ankle swelling.    Past Medical/Surgical History:    Past Medical History:   Diagnosis Date     Anemia 2013    Low blood plates current is 37     Arthritis      BPH (benign prostatic hyperplasia)      Cholelithiasis      Conductive hearing loss 8/16/2017    Have a lump on my right side of my face.  Had wax discharge     Depressive disorder 1986    Suffer effects throughout life     Gastroesophageal reflux disease 12/1/2014    Being treated with Prilosac     Hepatitis 2014    Diagnosed with schrosis ESTRADA in 2014.  Suffer from hepatatie     HTN (hypertension)      Hyperlipidemia      Liver cirrhosis secondary to  ESTRADA (H)      Thrombocytopenia (H)      Type II diabetes mellitus (H)     Insulin adminstered BID daily.      Past Surgical History:   Procedure Laterality Date     COLONOSCOPY      2015     ESOPHAGOSCOPY, GASTROSCOPY, DUODENOSCOPY (EGD), COMBINED N/A 11/17/2016    Procedure: COMBINED ESOPHAGOSCOPY, GASTROSCOPY, DUODENOSCOPY (EGD);  Surgeon: Santi Rosas MD;  Location: [facility]     ESOPHAGOSCOPY, GASTROSCOPY, DUODENOSCOPY (EGD), COMBINED N/A 11/17/2017    Procedure: COMBINED ESOPHAGOSCOPY, GASTROSCOPY, DUODENOSCOPY (EGD);  EGD;  Surgeon: Santi Rosas MD;  Location:  GI     ESOPHAGOSCOPY, GASTROSCOPY, DUODENOSCOPY (EGD), COMBINED N/A 12/28/2018    Procedure: EGD;  Surgeon: Santi Rosas MD;  Location:  OR     HEAD & NECK SURGERY      12/2017 at [facility].      IMPLANT GOLD WEIGHT EYELID Right 11/16/2017    Procedure: IMPLANT WEIGHT EYELID;  Right Upper Eyelid Weight, right tarsal strip lower eyelid;  Surgeon: Milana Malave MD;  Location:  OR     KNEE SURGERY Left      ORTHOPEDIC SURGERY       PAROTIDECTOMY, RADICAL NECK DISSECTION Right 11/2/2017    Procedure: PAROTIDECTOMY, RADICAL NECK DISSECTION;  Right Superfacial Parotidectomy , Facial nerve repair. with [facility] facial nerve monitor.;  Surgeon: Asiya Morgan MD;  Location: [facility] OR     PICC INSERTION Left 11/06/2017    4fr SL BioFlo PICC, 44cm in the L basilic vein w/ tip in the low SVC     VASCULAR SURGERY         Current Medications:    Current Outpatient Medications   Medication Sig Dispense Refill     ACCU-CHEK EDINSON PLUS test strip USE TO TEST BLOOD SUGARS FOUR TIMES DAILY OR AS DIRECTED 150 strip 11     Artificial Tear Ointment (REFRESH LACRI-LUBE) OINT Apply 1 Application to eye At Bedtime 1 Tube 3     Artificial Tear Solution (SM ARTIFICIAL TEARS) SOLN Place 1 drop into the right eye every hour as needed Apply at least 4 times daily and as needed for dry eye 1 Bottle 3     aspirin (ASPIRIN LOW DOSE) 81 MG EC tablet Take  1 tablet (81 mg) by mouth daily 90 tablet 2     BD VIKTORIA U/F 32G X 4 MM insulin pen needle Use 5 per day 300 each 3     blood glucose monitoring (ACCU-CHEK EDINSON PLUS) test strip USE TO TEST BLOOD SUGARS FOUR TIMES DAILY OR AS DIRECTED 400 strip 3     blood glucose monitoring (ACCU-CHEK EDINSON PLUS) test strip USE TO TEST BLOOD SUGARS FOUR TIMES DAILY OR AS DIRECTED 300 strip 3     blood glucose monitoring (ACCU-CHEK EDINSON PLUS) test strip Use to test blood sugar 4 times daily 400 each 3     blood glucose monitoring (ACCU-CHEK FASTCLIX) lancets Use to test blood sugar 4 times daily or as directed.  1 box = 102 lancets 408 each 3     capsaicin (ZOSTRIX) 0.025 % CREA cream To feet 2-3 times daily as needed. 60 g 6     carvedilol (COREG) 12.5 MG tablet TAKE 1 TABLET(12.5 MG) BY MOUTH TWICE DAILY WITH MEALS 180 tablet 3     Cholecalciferol (VITAMIN D-3 PO) Take 2,000 Units by mouth every morning        cyanocobalamin (RA VITAMIN B-12 TR) 1000 MCG TBCR Take 1,000 mcg by mouth every morning        dapagliflozin (FARXIGA) 10 MG TABS tablet Take 1 tablet (10 mg) by mouth daily 90 tablet 3     econazole nitrate 1 % cream Apply topically daily To feet and toenails. 85 g 1     erythromycin (ROMYCIN) ophthalmic ointment Place into the right eye 3 times daily 1 Tube 1     ferrous sulfate (IRON) 325 (65 Fe) MG tablet Take 1 tablet (325 mg) by mouth 3 times daily (with meals) 90 tablet 2     insulin degludec (TRESIBA) 200 UNIT/ML pen Take 100 units daily. 100 mL 3     IRON PO Take by mouth daily       lactulose (CHRONULAC) 10 GM/15ML solution Take 45 mLs (30 g) by mouth 4 times daily 5000 mL 11     Multiple Vitamin (THERAVITE PO) Take 1 tablet by mouth every morning        NOVOLOG FLEXPEN 100 UNIT/ML soln INJECT 1 UNIT PER 4 GRAMS OF CARBS AT MEALS AND SNACKS. CORRECTION SCALE OF 1 UNITS PER 25 OVER [address] AVE DOSE 75 UNITERS PER DAY 30 mL 3     OLANZapine (ZYPREXA) 2.5 MG tablet Take 1 tablet (2.5 mg) by mouth At Bedtime 90 tablet 1      omeprazole (PRILOSEC) 40 MG capsule Take 1 capsule (40 mg) by mouth daily 90 capsule 2     potassium chloride SA (K-DUR/KLOR-CON M) 20 MEQ CR tablet Take 1 tablet (20 mEq) by mouth daily 90 tablet 2     pravastatin (PRAVACHOL) 20 MG tablet Take 1 tablet (20 mg) by mouth daily 90 tablet 3     Propylene Glycol-Glycerin (ARTIFICIAL TEARS) 1-0.3 % SOLN Apply 1 drop to eye every 2 hours (while awake) 30 mL 11     rifaximin (XIFAXAN) 550 MG TABS tablet Take 1 tablet (550 mg) by mouth 2 times daily 60 tablet 11     sildenafil (REVATIO) 20 MG tablet Take 2-3  Tablets before activity by mouth 90 tablet 3     tadalafil (CIALIS) 20 MG tablet Take 1 tablet (20 mg) by mouth daily as needed 30 tablet 0     tamsulosin (FLOMAX) 0.4 MG capsule Take 1 capsule (0.4 mg) by mouth daily 90 capsule 3     UNABLE TO FIND 2 times daily ULTRA MALE ENHANCEMENT POTENCY       XIFAXAN 550 MG TABS tablet TAKE 1 TABLET(550 MG) BY MOUTH TWICE DAILY 60 tablet 3       Allergies:    Codeine    Family History:    Family History   Problem Relation Age of Onset     Prostate Cancer Maternal Grandfather      Substance Abuse Maternal Grandfather         Alcohol     Colon Cancer Father 60     Pancreatic Cancer Father 60     Prostate Cancer Father      Colorectal Cancer Father      Macular Degeneration Father      Cancer Father      Glaucoma Father      Colorectal Cancer Maternal Grandmother      Cancer Maternal Grandmother      Substance Abuse Maternal Grandmother         Alcohol     Colorectal Cancer Paternal Grandmother      Cancer Mother      Diabetes Mother          3/2016     Cerebrovascular Disease Mother         Passed away in Feb of this year, 80 years old.     Thyroid Disease Mother      Depression Mother      Asthma Sister         Had since birth     Thyroid Disease Sister      Depression Sister      Liver Disease No family hx of        Social History:  He is estranged from his local family including a daughter.  However, does have  support from a neighbor and family in [place] and [place].     Social History     Socioeconomic History     Marital status:      Spouse name: Not on file     Number of children: Not on file     Years of education: Not on file     Highest education level: Not on file   Social Needs     Financial resource strain: Not on file     Food insecurity - worry: Not on file     Food insecurity - inability: Not on file     Transportation needs - medical: Not on file     Transportation needs - non-medical: Not on file   Occupational History     Not on file   Tobacco Use     Smoking status: Never Smoker     Smokeless tobacco: Former User     Types: Chew     Tobacco comment: 1 tin per week   Substance and Sexual Activity     Alcohol use: No     Alcohol/week: 0.0 oz     Comment: quit Sept. 1996     Drug use: No     Sexual activity: Not Currently     Partners: Female     Birth control/protection: Condom   Other Topics Concern     Parent/sibling w/ CABG, MI or angioplasty before 65F 55M? Not Asked   Social History Narrative     Not on file       Physical Exam:    /77   Pulse 63   SpO2 97%      GENERAL APPEARANCE: healthy, alert and no distress  PSYCHIATRIC: mentation appears normal and affect normal.  EYES: No jaundice.  SKIN: No jaundice.   RESP: lungs clear to auscultation - no rales, rhonchi or wheezes  CARDIOVASCULAR: regular rates and rhythm, normal S1 S2, no S3 or S4 and no murmur  ABDOMEN:  soft, nontender, no masses and bowel sounds normal.  MUSCULOSKELETAL: No edema in the lower extremities.    Laboratory Studies:    Lab Results   Component Value Date    HGB 14.4 12/28/2018     Lab Results   Component Value Date    PLT 37 12/28/2018     Lab Results   Component Value Date    WBC 3.4 12/28/2018       Lab Results   Component Value Date    INR 1.30 12/28/2018       Lab Results   Component Value Date    PROTTOTAL 7.7 12/28/2018      Lab Results   Component Value Date    ALBUMIN 3.2 12/28/2018     Lab Results    Component Value Date    BILITOTAL 1.0 12/28/2018     No results found for: BILICONJ   Lab Results   Component Value Date    ALKPHOS 145 12/28/2018     Lab Results   Component Value Date    AST 55 12/28/2018     Lab Results   Component Value Date    ALT 56 12/28/2018       Lab Results   Component Value Date    CR 1.18 12/28/2018     Lab Results   Component Value Date    BUN 21 12/28/2018       Alpha Fetoprotein   Date Value Ref Range Status   12/28/2018 5.2 0 - 8 ug/L Final     Comment:     Assay Method:  Chemiluminescence using Siemens Centaur XP       Imaging:     I reviewed the MRI from 12/23/2018 which demonstrated a 3.1 cm HCC in segment 4 and a 1.1 HCC in segment 3.      ASSESSMENT:      Frandy Workman is a 53 year old male with ESTRADA induced cirrhosis and multifocal HCC.     Child-Clark score: A(6)  Native MELD score: 11  ECOG performance status: 0    I believe the patient is a candidate for a transarterial chemoembolization and ablation procedure for this unresectable HCC.  The plan will be to TACE then ablate the largest lesion, given it is >3cm.  The small lesion will be ablated at the same time.     I showed Mr. Workman the imaging and discussed the findings. I discussed with him that the goal of the procedure is local cure with a survival benefit rather than a cure given the nature of the disease. Alternatives were reviewed, including surgery, but the patient is not a surgical candidate.    I explained that the chemoembolization will be performed on day one under conscious sedation. He will then be admitted and undergo ablation on day 2.  We discussed what will happen before, during and after the procedure; what to expect in the post procedure recovery period; and what the follow-up will be. We discussed the risks of TACE which include, but are not limited to, vascular injury causing bleeding or occlusion of blood vessels, groin hematoma, infection, liver failure, and non-target embolization. We then  discussed the risks of ablation which include, but are not limited to infection, bleeding, thermal injury to adjacent structures, pneumothorax that may require a chest tube, and incomplete treatment.  I explained that sometimes more than one treatment session is needed to gain control of the tumors, particularly with larger tumors. Risks are increased the greater the deviation from normal lab values, especially albumin and total bilirubin, and also the larger the area we must treat. We also discussed the post-embolization syndrome which is likely and consists of varying degrees of pain, nausea/vomiting, malaise, fever, and elevated white blood cell counts for up to 2 weeks following the procedure. The patient also understands that new tumors may develop elsewhere given the nature of the disease. Many patients go home the same day as ablation, but occasionally circumstances are such that a longer admission may be required. I also informed the patient that I will be assisted during the procedure by a fellow and/or resident, and that sometimes a medical student may observe. All of the patient's questions were answered and the patient agreed to proceed.     PLAN:    We will schedule the patient for chemoembolization on day 1 and ablation the following day.    A total of 45 minutes was spent in care for the patient, of which >50% was spent in counseling and co-ordination of care.      Benigno Scott M.D.  Department of Interventional Radiology  [facility]      CC  Patient Care Team:  Elly Chun MD as PCP - General (Internal Medicine)  Santi Rosas MD as MD (Gastroenterology)  Jennifer Wilcox MD as MD (INTERNAL MEDICINE - ENDOCRINOLOGY, DIABETES & METABOLISM)  Chantal Montejo RN (Diabetes Education)  Julia Lee RD as Registered Dietitian (Nutrition)  Asiya Morgan MD as MD (Otolaryngology)  ELLY CHUN

## 2019-01-07 NOTE — TELEPHONE ENCOUNTER
POC reviewed with Dr. Banuelos who stated okay to start evaluation now.     Plan for week of 2/5/19. Orders placed and message routed for scheduling.

## 2019-01-07 NOTE — PATIENT INSTRUCTIONS
Date: Tuesday 1/22/2019    -Please check into the [facility] Waiting room at  1030AM for a 12PM Chemoembolization procedure.     Date: Wednesday 1/23/2019       Reminders:     -No food or milk products 6 hours prior to 12pm time.     -No clear liquids 2 hours prior to 12pm time.     -Please take your morning medications as indicated, except:     - HOLD ALL ORAL HYPOGLYCEMIC MEDICATIONS ON THE DAY OF THE PROCEDURE    -ONLY TAKE 80% OF YOUR LONG ACTING INSULIN    -HOLD NOVOLOG ON THE DAY OF THE PROCEDURE    -HOLD ASPIRIN 5 DAYS PRIOR TO THE PROCEDURE    -You will also be spending the night, so you may pack an overnight bag.         -As a reminder, we will have you follow up in  one month after this procedure with imaging and an appointment with your doctor.     ~~~~~~~~~~~~~~~~~~~~~~~~~~~~~~~~~~~~~~~~~~~~~~~~~~~~~~~~~~~~~~~~~~~~~~~~~~~~~      *Please keep in mind that you may have Post Embolization syndrome.  The reason for this is because the tumor is dying. These symptoms can last for up to 14 days.     This includes:    -high fevers 101-102, which may last for a couple of days. We recommend using over the counter medications such as Tylenol or Ibuprofen.     -Nausea, in which we will give you medications after you are discharged home.     -Decreased appetite: We don't expect you to eat 3 full meals. Instead, we prefer that you  snack through out the day.     -Feeling fatigue: this is normal, however we recommend that you get up and walk around.     **Please keep in mind to stay hydrated after the procedure. At  Least 6 glasses of water a day.      *Abnormal symptoms in which to call or seek immediate help:  1. Confusion!!-GO TO THE EMERGENCY ROOM.  2. Swelling of the lower legs  3. Severe abdominal pain that doesn't go away with pain medications.   4. High fevers that do not come down with over the counter medications.     Should ANY of the above symptoms are exhibited, please seek immediate help or call our  [facility] at [phone] and ask for the Interventional Radiologist On-call (after hours) or you can contact me (during business hours) 8-4pm.      Please review your schedule and should you have any further questions, you may call me at my direct line.     Sincerely,     Rani Valdez RN, BSN  Interventional Radiology Nurse Coordinator   Phone: [phone]

## 2019-01-11 DIAGNOSIS — B35.3 TINEA PEDIS OF BOTH FEET: ICD-10-CM

## 2019-01-11 NOTE — PROGRESS NOTES
Interventional Radiology Clinic Visit    Date of this visit: 1/11/2019    Frandy Workman  is referred by Dr. Banuelos for treatment recommendations. The patient requires evaluation for diagnosis of multifocal HCC     Primary Physician: Natalie Chun        History Of Present Illness:    Frandy Workman is a 53 year old male who presents with diagnosis of HCC on a background of ESTRADA cirrhosis.  The patient was initial diagnosed in 2013 and is under the care of Dr. Banuelos.  He was found to have HCC on routine screening.  He has had mild HE, but denies jaundice or variceal bleeding history.  He has had ascites, which is controlled with medications.  He has no cancer specific complaints.       Review of Systems:    General: Negative for recent fever.  Skin: Negative for jaundice.  Eyes: Negative for jaundice.  Respiratory: Negative for shortness of breath.  Cardiovascular: Negative for chest pain.  Gastrointestinal: Negative for abdominal pain or swelling, nausea, vomiting, or diarrhea.  Musculoskeletal: Negative for ankle swelling.    Past Medical/Surgical History:    Past Medical History:   Diagnosis Date     Anemia 2013    Low blood plates current is 37     Arthritis      BPH (benign prostatic hyperplasia)      Cholelithiasis      Conductive hearing loss 8/16/2017    Have a lump on my right side of my face.  Had wax discharge     Depressive disorder 1986    Suffer effects throughout life     Gastroesophageal reflux disease 12/1/2014    Being treated with Prilosac     Hepatitis 2014    Diagnosed with schrosis ESTRADA in 2014.  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      Past Surgical History:   Procedure Laterality Date     COLONOSCOPY      2015     ESOPHAGOSCOPY, GASTROSCOPY, DUODENOSCOPY (EGD), COMBINED N/A 11/17/2016    Procedure: COMBINED  ESOPHAGOSCOPY, GASTROSCOPY, DUODENOSCOPY (EGD);  Surgeon: Santi Rosas MD;  Location: [facility]     ESOPHAGOSCOPY, GASTROSCOPY, DUODENOSCOPY (EGD), COMBINED N/A 11/17/2017    Procedure: COMBINED ESOPHAGOSCOPY, GASTROSCOPY, DUODENOSCOPY (EGD);  EGD;  Surgeon: Santi Rosas MD;  Location: [facility]     ESOPHAGOSCOPY, GASTROSCOPY, DUODENOSCOPY (EGD), COMBINED N/A 12/28/2018    Procedure: EGD;  Surgeon: aSnti Rosas MD;  Location:  OR     HEAD & NECK SURGERY      12/2017 at [facility].      IMPLANT GOLD WEIGHT EYELID Right 11/16/2017    Procedure: IMPLANT WEIGHT EYELID;  Right Upper Eyelid Weight, right tarsal strip lower eyelid;  Surgeon: Milana Malave MD;  Location:  OR     KNEE SURGERY Left      ORTHOPEDIC SURGERY       PAROTIDECTOMY, RADICAL NECK DISSECTION Right 11/2/2017    Procedure: PAROTIDECTOMY, RADICAL NECK DISSECTION;  Right Superfacial Parotidectomy , Facial nerve repair. with [facility] facial nerve monitor.;  Surgeon: Asiya Morgan MD;  Location: [facility]     PICC INSERTION Left 11/06/2017    4fr SL BioFlo PICC, 44cm in the L basilic vein w/ tip in the low SVC     VASCULAR SURGERY         Current Medications:    Current Outpatient Medications   Medication Sig Dispense Refill     ACCU-CHEK EDINSON PLUS test strip USE TO TEST BLOOD SUGARS FOUR TIMES DAILY OR AS DIRECTED 150 strip 11     Artificial Tear Ointment (REFRESH LACRI-LUBE) OINT Apply 1 Application to eye At Bedtime 1 Tube 3     Artificial Tear Solution (SM ARTIFICIAL TEARS) SOLN Place 1 drop into the right eye every hour as needed Apply at least 4 times daily and as needed for dry eye 1 Bottle 3     aspirin (ASPIRIN LOW DOSE) 81 MG EC tablet Take 1 tablet (81 mg) by mouth daily 90 tablet 2     BD VIKTORIA U/F 32G X 4 MM insulin pen needle Use 5 per day 300 each 3     blood glucose monitoring (ACCU-CHEK EDINSON PLUS) test strip USE TO TEST BLOOD SUGARS FOUR TIMES DAILY OR AS DIRECTED 400 strip 3     blood glucose monitoring  (ACCU-CHEK EDINSON PLUS) test strip USE TO TEST BLOOD SUGARS FOUR TIMES DAILY OR AS DIRECTED 300 strip 3     blood glucose monitoring (ACCU-CHEK EDINSON PLUS) test strip Use to test blood sugar 4 times daily 400 each 3     blood glucose monitoring (ACCU-CHEK FASTCLIX) lancets Use to test blood sugar 4 times daily or as directed.  1 box = 102 lancets 408 each 3     capsaicin (ZOSTRIX) 0.025 % CREA cream To feet 2-3 times daily as needed. 60 g 6     carvedilol (COREG) 12.5 MG tablet TAKE 1 TABLET(12.5 MG) BY MOUTH TWICE DAILY WITH MEALS 180 tablet 3     Cholecalciferol (VITAMIN D-3 PO) Take 2,000 Units by mouth every morning        cyanocobalamin (RA VITAMIN B-12 TR) 1000 MCG TBCR Take 1,000 mcg by mouth every morning        dapagliflozin (FARXIGA) 10 MG TABS tablet Take 1 tablet (10 mg) by mouth daily 90 tablet 3     econazole nitrate 1 % cream Apply topically daily To feet and toenails. 85 g 1     erythromycin (ROMYCIN) ophthalmic ointment Place into the right eye 3 times daily 1 Tube 1     ferrous sulfate (IRON) 325 (65 Fe) MG tablet Take 1 tablet (325 mg) by mouth 3 times daily (with meals) 90 tablet 2     insulin degludec (TRESIBA) 200 UNIT/ML pen Take 100 units daily. 100 mL 3     IRON PO Take by mouth daily       lactulose (CHRONULAC) 10 GM/15ML solution Take 45 mLs (30 g) by mouth 4 times daily 5000 mL 11     Multiple Vitamin (THERAVITE PO) Take 1 tablet by mouth every morning        NOVOLOG FLEXPEN 100 UNIT/ML soln INJECT 1 UNIT PER 4 GRAMS OF CARBS AT MEALS AND SNACKS. CORRECTION SCALE OF 1 UNITS PER 25 OVER [address] DOSE 75 UNITERS PER DAY 30 mL 3     OLANZapine (ZYPREXA) 2.5 MG tablet Take 1 tablet (2.5 mg) by mouth At Bedtime 90 tablet 1     omeprazole (PRILOSEC) 40 MG capsule Take 1 capsule (40 mg) by mouth daily 90 capsule 2     potassium chloride SA (K-DUR/KLOR-CON M) 20 MEQ CR tablet Take 1 tablet (20 mEq) by mouth daily 90 tablet 2     pravastatin (PRAVACHOL) 20 MG tablet Take 1 tablet (20 mg) by mouth  daily 90 tablet 3     Propylene Glycol-Glycerin (ARTIFICIAL TEARS) 1-0.3 % SOLN Apply 1 drop to eye every 2 hours (while awake) 30 mL 11     rifaximin (XIFAXAN) 550 MG TABS tablet Take 1 tablet (550 mg) by mouth 2 times daily 60 tablet 11     sildenafil (REVATIO) 20 MG tablet Take 2-3  Tablets before activity by mouth 90 tablet 3     tadalafil (CIALIS) 20 MG tablet Take 1 tablet (20 mg) by mouth daily as needed 30 tablet 0     tamsulosin (FLOMAX) 0.4 MG capsule Take 1 capsule (0.4 mg) by mouth daily 90 capsule 3     UNABLE TO FIND 2 times daily ULTRA MALE ENHANCEMENT POTENCY       XIFAXAN 550 MG TABS tablet TAKE 1 TABLET(550 MG) BY MOUTH TWICE DAILY 60 tablet 3       Allergies:    Codeine    Family History:    Family History   Problem Relation Age of Onset     Prostate Cancer Maternal Grandfather      Substance Abuse Maternal Grandfather         Alcohol     Colon Cancer Father 60     Pancreatic Cancer Father 60     Prostate Cancer Father      Colorectal Cancer Father      Macular Degeneration Father      Cancer Father      Glaucoma Father      Colorectal Cancer Maternal Grandmother      Cancer Maternal Grandmother      Substance Abuse Maternal Grandmother         Alcohol     Colorectal Cancer Paternal Grandmother      Cancer Mother      Diabetes Mother          3/2016     Cerebrovascular Disease Mother         Passed away in Feb of this year, 80 years old.     Thyroid Disease Mother      Depression Mother      Asthma Sister         Had since birth     Thyroid Disease Sister      Depression Sister      Liver Disease No family hx of        Social History:  He is estranged from his local family including a daughter.  However, does have support from a neighbor and family in [place] and [place].     Social History     Socioeconomic History     Marital status:      Spouse name: Not on file     Number of children: Not on file     Years of education: Not on file     Highest education level: Not on file    Social Needs     Financial resource strain: Not on file     Food insecurity - worry: Not on file     Food insecurity - inability: Not on file     Transportation needs - medical: Not on file     Transportation needs - non-medical: Not on file   Occupational History     Not on file   Tobacco Use     Smoking status: Never Smoker     Smokeless tobacco: Former User     Types: Chew     Tobacco comment: 1 tin per week   Substance and Sexual Activity     Alcohol use: No     Alcohol/week: 0.0 oz     Comment: quit Sept. 1996     Drug use: No     Sexual activity: Not Currently     Partners: Female     Birth control/protection: Condom   Other Topics Concern     Parent/sibling w/ CABG, MI or angioplasty before 65F 55M? Not Asked   Social History Narrative     Not on file       Physical Exam:    /77   Pulse 63   SpO2 97%      GENERAL APPEARANCE: healthy, alert and no distress  PSYCHIATRIC: mentation appears normal and affect normal.  EYES: No jaundice.  SKIN: No jaundice.   RESP: lungs clear to auscultation - no rales, rhonchi or wheezes  CARDIOVASCULAR: regular rates and rhythm, normal S1 S2, no S3 or S4 and no murmur  ABDOMEN:  soft, nontender, no masses and bowel sounds normal.  MUSCULOSKELETAL: No edema in the lower extremities.    Laboratory Studies:    Lab Results   Component Value Date    HGB 14.4 12/28/2018     Lab Results   Component Value Date    PLT 37 12/28/2018     Lab Results   Component Value Date    WBC 3.4 12/28/2018       Lab Results   Component Value Date    INR 1.30 12/28/2018       Lab Results   Component Value Date    PROTTOTAL 7.7 12/28/2018      Lab Results   Component Value Date    ALBUMIN 3.2 12/28/2018     Lab Results   Component Value Date    BILITOTAL 1.0 12/28/2018     No results found for: BILICONJ   Lab Results   Component Value Date    ALKPHOS 145 12/28/2018     Lab Results   Component Value Date    AST 55 12/28/2018     Lab Results   Component Value Date    ALT 56 12/28/2018       Lab  Results   Component Value Date    CR 1.18 12/28/2018     Lab Results   Component Value Date    BUN 21 12/28/2018       Alpha Fetoprotein   Date Value Ref Range Status   12/28/2018 5.2 0 - 8 ug/L Final     Comment:     Assay Method:  Chemiluminescence using Siemens Centaur XP       Imaging:     I reviewed the MRI from 12/23/2018 which demonstrated a 3.1 cm HCC in segment 4 and a 1.1 HCC in segment 3.      ASSESSMENT:      Frandy Workman is a 53 year old male with ESTRADA induced cirrhosis and multifocal HCC.     Child-Clark score: A(6)  Native MELD score: 11  ECOG performance status: 0    I believe the patient is a candidate for a transarterial chemoembolization and ablation procedure for this unresectable HCC.  The plan will be to TACE then ablate the largest lesion, given it is >3cm.  The small lesion will be ablated at the same time.     I showed Mr. Workman the imaging and discussed the findings. I discussed with him that the goal of the procedure is local cure with a survival benefit rather than a cure given the nature of the disease. Alternatives were reviewed, including surgery, but the patient is not a surgical candidate.    I explained that the chemoembolization will be performed on day one under conscious sedation. He will then be admitted and undergo ablation on day 2.  We discussed what will happen before, during and after the procedure; what to expect in the post procedure recovery period; and what the follow-up will be. We discussed the risks of TACE which include, but are not limited to, vascular injury causing bleeding or occlusion of blood vessels, groin hematoma, infection, liver failure, and non-target embolization. We then discussed the risks of ablation which include, but are not limited to infection, bleeding, thermal injury to adjacent structures, pneumothorax that may require a chest tube, and incomplete treatment.  I explained that sometimes more than one treatment session is needed to gain  control of the tumors, particularly with larger tumors. Risks are increased the greater the deviation from normal lab values, especially albumin and total bilirubin, and also the larger the area we must treat. We also discussed the post-embolization syndrome which is likely and consists of varying degrees of pain, nausea/vomiting, malaise, fever, and elevated white blood cell counts for up to 2 weeks following the procedure. The patient also understands that new tumors may develop elsewhere given the nature of the disease. Many patients go home the same day as ablation, but occasionally circumstances are such that a longer admission may be required. I also informed the patient that I will be assisted during the procedure by a fellow and/or resident, and that sometimes a medical student may observe. All of the patient's questions were answered and the patient agreed to proceed.     PLAN:    We will schedule the patient for chemoembolization on day 1 and ablation the following day.      A total of 45 minutes was spent in care for the patient, of which >50% was spent in counseling and co-ordination of care.    It was a pleasure seeing the patient.     Benigno Mcgovern M.D.  Department of Interventional Radiology  [facility]      CC  Patient Care Team:  Elly Chun MD as PCP - General (Internal Medicine)  Santi Rosas MD as MD (Gastroenterology)  Jennifer Wilcox MD as MD (INTERNAL MEDICINE - ENDOCRINOLOGY, DIABETES & METABOLISM)  Chantal Montejo, RN (Diabetes Education)  Julia Lee RD as Registered Dietitian (Nutrition)  Asiya Morgan MD as MD (Otolaryngology)  ELLY CHUN

## 2019-01-14 ENCOUNTER — ONCOLOGY VISIT (OUTPATIENT)
Dept: ONCOLOGY | Facility: CLINIC | Age: 54
End: 2019-01-14
Attending: INTERNAL MEDICINE
Payer: MEDICARE

## 2019-01-14 VITALS
TEMPERATURE: 97.8 F | WEIGHT: 189 LBS | BODY MASS INDEX: 27.99 KG/M2 | HEIGHT: 69 IN | OXYGEN SATURATION: 100 % | SYSTOLIC BLOOD PRESSURE: 120 MMHG | HEART RATE: 63 BPM | RESPIRATION RATE: 16 BRPM | DIASTOLIC BLOOD PRESSURE: 61 MMHG

## 2019-01-14 DIAGNOSIS — K74.60 CIRRHOSIS OF LIVER (H): ICD-10-CM

## 2019-01-14 DIAGNOSIS — D69.6 THROMBOCYTOPENIA (H): Primary | ICD-10-CM

## 2019-01-14 PROCEDURE — G0463 HOSPITAL OUTPT CLINIC VISIT: HCPCS | Mod: ZF

## 2019-01-14 PROCEDURE — 99205 OFFICE O/P NEW HI 60 MIN: CPT | Mod: ZP | Performed by: INTERNAL MEDICINE

## 2019-01-14 ASSESSMENT — MIFFLIN-ST. JEOR: SCORE: 1687.68

## 2019-01-14 ASSESSMENT — PAIN SCALES - GENERAL: PAINLEVEL: MODERATE PAIN (5)

## 2019-01-14 NOTE — PROGRESS NOTES
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   He used to live in [place], but moved to [place] to be closer to his daughter, although he seems to be estranged from her now, as well as the rest of his family.  He has cirrhosis felt secondary to ESTRADA, and says that he was on the transplant list at [place] when he lived in [place].  He follows with Dr. Banuelos here in the hepatology clinic now.  He underwent routine screening ultrasound on 12/10/18, which showed a non-specific left hepatic lobe lesion.  MRI abdomen on 12/23/19 showed 2 lesions, measuring 3.1 cm and 1.1 cm in hepatic segments 4b and 3, respectively, that were LIRADS 5.  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  He was referred to IR for liver-directed therapy and started transplant evaluation.      Currently, Miller says that he feels well.  He does not smoke, but used to chew tobacco.  He had a parotid gland mass removed previously that was benign.  He does not drink alcohol.    He notes that he has no family or friends here.  He is estranged from his family and daughter.  He notes that his neighbor can give him rides to appointments.            REVIEW OF SYSTEMS:   14 point ROS was reviewed and is negative other than as noted above in HPI.       HOME MEDICATIONS:  Current Outpatient Medications   Medication Sig Dispense Refill     ACCU-CHEK EDINSON PLUS test strip USE TO TEST BLOOD SUGARS FOUR TIMES DAILY OR AS DIRECTED 150 strip 11     Artificial Tear Ointment (REFRESH LACRI-LUBE) OINT Apply 1 Application to eye At Bedtime 1 Tube 3     Artificial Tear Solution (SM ARTIFICIAL TEARS) SOLN Place 1 drop into the right eye every hour as needed Apply at  least 4 times daily and as needed for dry eye 1 Bottle 3     aspirin (ASPIRIN LOW DOSE) 81 MG EC tablet Take 1 tablet (81 mg) by mouth daily 90 tablet 2     BD VIKTORIA U/F 32G X 4 MM insulin pen needle Use 5 per day 300 each 3     blood glucose monitoring (ACCU-CHEK EDINSON PLUS) test strip USE TO TEST BLOOD SUGARS FOUR TIMES DAILY OR AS DIRECTED 400 strip 3     blood glucose monitoring (ACCU-CHEK EDINSON PLUS) test strip USE TO TEST BLOOD SUGARS FOUR TIMES DAILY OR AS DIRECTED 300 strip 3     blood glucose monitoring (ACCU-CHEK EDINSON PLUS) test strip Use to test blood sugar 4 times daily 400 each 3     blood glucose monitoring (ACCU-CHEK FASTCLIX) lancets Use to test blood sugar 4 times daily or as directed.  1 box = 102 lancets 408 each 3     capsaicin (ZOSTRIX) 0.025 % CREA cream To feet 2-3 times daily as needed. 60 g 6     carvedilol (COREG) 12.5 MG tablet TAKE 1 TABLET(12.5 MG) BY MOUTH TWICE DAILY WITH MEALS 180 tablet 3     Cholecalciferol (VITAMIN D-3 PO) Take 2,000 Units by mouth every morning        cyanocobalamin (RA VITAMIN B-12 TR) 1000 MCG TBCR Take 1,000 mcg by mouth every morning        dapagliflozin (FARXIGA) 10 MG TABS tablet Take 1 tablet (10 mg) by mouth daily 90 tablet 3     econazole nitrate 1 % cream Apply topically daily To feet and toenails. 85 g 1     erythromycin (ROMYCIN) ophthalmic ointment Place into the right eye 3 times daily 1 Tube 1     insulin degludec (TRESIBA) 200 UNIT/ML pen Take 100 units daily. 100 mL 3     IRON PO Take by mouth daily       lactulose (CHRONULAC) 10 GM/15ML solution Take 45 mLs (30 g) by mouth 4 times daily 5000 mL 11     Multiple Vitamin (THERAVITE PO) Take 1 tablet by mouth every morning        NOVOLOG FLEXPEN 100 UNIT/ML soln INJECT 1 UNIT PER 4 GRAMS OF CARBS AT MEALS AND SNACKS. CORRECTION SCALE OF 1 UNITS PER 25 OVER [address] DOSE 75 UNITERS PER DAY 30 mL 3     OLANZapine (ZYPREXA) 2.5 MG tablet Take 1 tablet (2.5 mg) by mouth At Bedtime 90 tablet 1      omeprazole (PRILOSEC) 40 MG capsule Take 1 capsule (40 mg) by mouth daily 90 capsule 2     potassium chloride SA (K-DUR/KLOR-CON M) 20 MEQ CR tablet Take 1 tablet (20 mEq) by mouth daily 90 tablet 2     pravastatin (PRAVACHOL) 20 MG tablet Take 1 tablet (20 mg) by mouth daily 90 tablet 3     Propylene Glycol-Glycerin (ARTIFICIAL TEARS) 1-0.3 % SOLN Apply 1 drop to eye every 2 hours (while awake) 30 mL 11     rifaximin (XIFAXAN) 550 MG TABS tablet Take 1 tablet (550 mg) by mouth 2 times daily 60 tablet 11     sildenafil (REVATIO) 20 MG tablet Take 2-3  Tablets before activity by mouth 90 tablet 3     tadalafil (CIALIS) 20 MG tablet Take 1 tablet (20 mg) by mouth daily as needed 30 tablet 0     tamsulosin (FLOMAX) 0.4 MG capsule Take 1 capsule (0.4 mg) by mouth daily 90 capsule 3     UNABLE TO FIND 2 times daily ULTRA MALE ENHANCEMENT POTENCY       XIFAXAN 550 MG TABS tablet TAKE 1 TABLET(550 MG) BY MOUTH TWICE DAILY 60 tablet 3         ALLERGIES:  Allergies   Allergen Reactions     Codeine Other (See Comments)     Cannot take due to liver  Cannot tolerate oral narcotics         PAST MEDICAL HISTORY:  Past Medical History:   Diagnosis Date     Anemia 2013    Low blood plates current is 37     Arthritis      BPH (benign prostatic hyperplasia)      Cholelithiasis      Conductive hearing loss 8/16/2017    Have a lump on my right side of my face.  Had wax discharge     Depressive disorder 1986    Suffer effects throughout life     Gastroesophageal reflux disease 12/1/2014    Being treated with Prilosac     Hepatitis 2014    Diagnosed with schrosis ESTRADA in 2014.  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          PAST SURGICAL HISTORY:  Past Surgical History:   Procedure Laterality Date     COLONOSCOPY      2015     ESOPHAGOSCOPY, GASTROSCOPY, DUODENOSCOPY (EGD),  COMBINED N/A 11/17/2016    Procedure: COMBINED ESOPHAGOSCOPY, GASTROSCOPY, DUODENOSCOPY (EGD);  Surgeon: Santi Rosas MD;  Location: [facility]     ESOPHAGOSCOPY, GASTROSCOPY, DUODENOSCOPY (EGD), COMBINED N/A 11/17/2017    Procedure: COMBINED ESOPHAGOSCOPY, GASTROSCOPY, DUODENOSCOPY (EGD);  EGD;  Surgeon: Santi Rosas MD;  Location: [facility]     ESOPHAGOSCOPY, GASTROSCOPY, DUODENOSCOPY (EGD), COMBINED N/A 12/28/2018    Procedure: EGD;  Surgeon: Santi Rosas MD;  Location:  OR     HEAD & NECK SURGERY      12/2017 at [facility].      IMPLANT GOLD WEIGHT EYELID Right 11/16/2017    Procedure: IMPLANT WEIGHT EYELID;  Right Upper Eyelid Weight, right tarsal strip lower eyelid;  Surgeon: Milana Malave MD;  Location: [facility] OR     KNEE SURGERY Left      ORTHOPEDIC SURGERY       PAROTIDECTOMY, RADICAL NECK DISSECTION Right 11/2/2017    Procedure: PAROTIDECTOMY, RADICAL NECK DISSECTION;  Right Superfacial Parotidectomy , Facial nerve repair. with [facility] facial nerve monitor.;  Surgeon: Asiya Morgan MD;  Location: [facility] OR     PICC INSERTION Left 11/06/2017    4fr SL BioFlo PICC, 44cm in the L basilic vein w/ tip in the low SVC     VASCULAR SURGERY           SOCIAL HISTORY:  Social History     Socioeconomic History     Marital status:      Spouse name: Not on file     Number of children: Not on file     Years of education: Not on file     Highest education level: Not on file   Social Needs     Financial resource strain: Not on file     Food insecurity - worry: Not on file     Food insecurity - inability: Not on file     Transportation needs - medical: Not on file     Transportation needs - non-medical: Not on file   Occupational History     Not on file   Tobacco Use     Smoking status: Never Smoker     Smokeless tobacco: Former User     Types: Chew     Tobacco comment: 1 tin per week   Substance and Sexual Activity     Alcohol use: No     Alcohol/week: 0.0 oz     Comment: quit Sept. 1996     Drug  "use: No     Sexual activity: Not Currently     Partners: Female     Birth control/protection: Condom   Other Topics Concern     Parent/sibling w/ CABG, MI or angioplasty before 65F 55M? Not Asked   Social History Narrative     Not on file         FAMILY HISTORY:  Family History   Problem Relation Age of Onset     Prostate Cancer Maternal Grandfather      Substance Abuse Maternal Grandfather         Alcohol     Colon Cancer Father 60     Pancreatic Cancer Father 60     Prostate Cancer Father      Colorectal Cancer Father      Macular Degeneration Father      Cancer Father      Glaucoma Father      Colorectal Cancer Maternal Grandmother      Cancer Maternal Grandmother      Substance Abuse Maternal Grandmother         Alcohol     Colorectal Cancer Paternal Grandmother      Cancer Mother      Diabetes Mother          3/2016     Cerebrovascular Disease Mother         Passed away in Feb of this year, 80 years old.     Thyroid Disease Mother      Depression Mother      Asthma Sister         Had since birth     Thyroid Disease Sister      Depression Sister      Liver Disease No family hx of          PHYSICAL EXAM:  Vital signs:  /61   Pulse 63   Temp 97.8  F (36.6  C) (Oral)   Resp 16   Ht 1.753 m (5' 9\")   Wt 85.7 kg (189 lb)   SpO2 100%   BMI 27.91 kg/m     ECO  GENERAL/CONSTITUTIONAL: No acute distress.  EYES: No scleral icterus.  RESPIRATORY: Clear to auscultation bilaterally. No crackles or wheezing.   CARDIOVASCULAR: Regular rate and rhythm without murmurs, gallops, or rubs.  GASTROINTESTINAL: No tenderness. The patient has normal bowel sounds. No guarding.    MUSCULOSKELETAL: Warm and well-perfused.  NEUROLOGIC: Alert, oriented, answers questions appropriately.  INTEGUMENTARY: No jaundice.      LABS:  CBC RESULTS:   Recent Labs   Lab Test 18  1108   WBC 3.4*   RBC 4.04*   HGB 14.4   HCT 42.0   *   MCH 35.6*   MCHC 34.3   RDW 14.6   PLT 37*       Recent Labs   Lab Test " 12/28/18  1108 10/15/18  1237   * 138   POTASSIUM 4.6 4.2   CHLORIDE 113* 110*   CO2 24 21   ANIONGAP 7 7   * 128*   BUN 21 28   CR 1.18 1.10   DEBORAH 8.8 8.4*     Lab Results   Component Value Date    AST 55 12/28/2018     Lab Results   Component Value Date    ALT 56 12/28/2018     No results found for: BILICONJ   Lab Results   Component Value Date    BILITOTAL 1.0 12/28/2018     Lab Results   Component Value Date    ALBUMIN 3.2 12/28/2018     Lab Results   Component Value Date    PROTTOTAL 7.7 12/28/2018      Lab Results   Component Value Date    ALKPHOS 145 12/28/2018     Component      Latest Ref Rng & Units 12/28/2018   Alpha Fetoprotein      0 - 8 ug/L 5.2     INR 1.30      IMAGING:  MRI abdomen 12/23/18:  1. Cirrhosis and evidence of portal hypertension.  2. There are 2 LIRADS 5 lesions measuring 3.1 cm and 1.1 cm in hepatic  segment IVb and 3, respectively.  3. No additional indeterminate or suspicious lesions.  4. Based on this exam only, the patient is not within [place] criteria.  5. Nonocclusive thrombus in the main portal vein. This is thought to  represent a bland thrombus and not tumor thrombus.    CT chest 1/4/19:  New diagnosis of hepatocellular carcinoma: No evidence of  metastatic disease to the chest. Incidental findings as described in  the body support.    Bone scan 1/4/19:  No osseous metastasis.       ASSESSMENT/PLAN:  Frandy Workman is a 53 year old male with:    1) Hepatocellular carcinoma: Child-Clark A (6).  We reviewed the recent imaging.  Patient has a 3.1 cm lesion in segment 4b and 1.1 cm lesion in segment 3.  His CT chest and bone scan were negative for metastatic disease.  He has already met with IR for evaluation of locoregional disease, Dr. Scott.  He is planning to undergo TACE and ablation on 1/23/19.    -he will undergo treatment, with follow-up imaging and labs, as per IR  -he is undergoing liver transplant evaluation  -RTC in 3 months    2) Cirrhosis: secondary to  ESTRADA  -follows with hepatology - Dr. Banuelos    3) Thrombocytopenia and leukopenia: This has been a chronic issue and likely secondary to his history of liver disease.  -will add on peripheral smear, vitamin B12, folate, peripheral flow to his next labs    4) Diabetes:   -follows with endocrinology      I spent a total of 60 minutes with the patient, with over >50% of the time in counseling and/or coordination of care.       Amanda Lees MD  Hematology/Oncology  [facility]

## 2019-01-14 NOTE — LETTER
1/14/2019       RE: Frandy Workman  [address]  [facility] [address]     Dear Colleague,    Thank you for referring your patient, Frandy Workman, to the [facility]. Please see a copy of my visit note below.        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   He used to live in [place], but moved to [place] to be closer to his daughter, although he seems to be estranged from her now, as well as the rest of his family.  He has cirrhosis felt secondary to ESTRADA, and says that he was on the transplant list at [facility] when he lived in [place].  He follows with Dr. Banuelos here in the hepatology clinic now.  He underwent routine screening ultrasound on 12/10/18, which showed a non-specific left hepatic lobe lesion.  MRI abdomen on 12/23/19 showed 2 lesions, measuring 3.1 cm and 1.1 cm in hepatic segments 4b and 3, respectively, that were LIRADS 5.  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  He was referred to IR for liver-directed therapy and started transplant evaluation.      Currently, Miller says that he feels well.  He does not smoke, but used to chew tobacco.  He had a parotid gland mass removed previously that was benign.  He does not drink alcohol.    He notes that he has no family or friends here.  He is estranged from his family and daughter.  He notes that his neighbor can give him rides to appointments.            REVIEW OF SYSTEMS:   14 point ROS was reviewed and is negative other than as noted above in HPI.       HOME MEDICATIONS:  Current Outpatient Medications   Medication Sig Dispense Refill     ACCU-CHEK EDINSON PLUS test strip USE TO TEST BLOOD SUGARS FOUR TIMES DAILY  OR AS DIRECTED 150 strip 11     Artificial Tear Ointment (REFRESH LACRI-LUBE) OINT Apply 1 Application to eye At Bedtime 1 Tube 3     Artificial Tear Solution (SM ARTIFICIAL TEARS) SOLN Place 1 drop into the right eye every hour as needed Apply at least 4 times daily and as needed for dry eye 1 Bottle 3     aspirin (ASPIRIN LOW DOSE) 81 MG EC tablet Take 1 tablet (81 mg) by mouth daily 90 tablet 2     BD VIKTORIA U/F 32G X 4 MM insulin pen needle Use 5 per day 300 each 3     blood glucose monitoring (ACCU-CHEK EDINSON PLUS) test strip USE TO TEST BLOOD SUGARS FOUR TIMES DAILY OR AS DIRECTED 400 strip 3     blood glucose monitoring (ACCU-CHEK EDINSON PLUS) test strip USE TO TEST BLOOD SUGARS FOUR TIMES DAILY OR AS DIRECTED 300 strip 3     blood glucose monitoring (ACCU-CHEK EDINSNO PLUS) test strip Use to test blood sugar 4 times daily 400 each 3     blood glucose monitoring (ACCU-CHEK FASTCLIX) lancets Use to test blood sugar 4 times daily or as directed.  1 box = 102 lancets 408 each 3     capsaicin (ZOSTRIX) 0.025 % CREA cream To feet 2-3 times daily as needed. 60 g 6     carvedilol (COREG) 12.5 MG tablet TAKE 1 TABLET(12.5 MG) BY MOUTH TWICE DAILY WITH MEALS 180 tablet 3     Cholecalciferol (VITAMIN D-3 PO) Take 2,000 Units by mouth every morning        cyanocobalamin (RA VITAMIN B-12 TR) 1000 MCG TBCR Take 1,000 mcg by mouth every morning        dapagliflozin (FARXIGA) 10 MG TABS tablet Take 1 tablet (10 mg) by mouth daily 90 tablet 3     econazole nitrate 1 % cream Apply topically daily To feet and toenails. 85 g 1     erythromycin (ROMYCIN) ophthalmic ointment Place into the right eye 3 times daily 1 Tube 1     insulin degludec (TRESIBA) 200 UNIT/ML pen Take 100 units daily. 100 mL 3     IRON PO Take by mouth daily       lactulose (CHRONULAC) 10 GM/15ML solution Take 45 mLs (30 g) by mouth 4 times daily 5000 mL 11     Multiple Vitamin (THERAVITE PO) Take 1 tablet by mouth every morning        NOVOLOG FLEXPEN 100 UNIT/ML  soln INJECT 1 UNIT PER 4 GRAMS OF CARBS AT MEALS AND SNACKS. CORRECTION SCALE OF 1 UNITS PER 25 OVER [address]. AVE DOSE 75 UNITERS PER DAY 30 mL 3     OLANZapine (ZYPREXA) 2.5 MG tablet Take 1 tablet (2.5 mg) by mouth At Bedtime 90 tablet 1     omeprazole (PRILOSEC) 40 MG capsule Take 1 capsule (40 mg) by mouth daily 90 capsule 2     potassium chloride SA (K-DUR/KLOR-CON M) 20 MEQ CR tablet Take 1 tablet (20 mEq) by mouth daily 90 tablet 2     pravastatin (PRAVACHOL) 20 MG tablet Take 1 tablet (20 mg) by mouth daily 90 tablet 3     Propylene Glycol-Glycerin (ARTIFICIAL TEARS) 1-0.3 % SOLN Apply 1 drop to eye every 2 hours (while awake) 30 mL 11     rifaximin (XIFAXAN) 550 MG TABS tablet Take 1 tablet (550 mg) by mouth 2 times daily 60 tablet 11     sildenafil (REVATIO) 20 MG tablet Take 2-3  Tablets before activity by mouth 90 tablet 3     tadalafil (CIALIS) 20 MG tablet Take 1 tablet (20 mg) by mouth daily as needed 30 tablet 0     tamsulosin (FLOMAX) 0.4 MG capsule Take 1 capsule (0.4 mg) by mouth daily 90 capsule 3     UNABLE TO FIND 2 times daily ULTRA MALE ENHANCEMENT POTENCY       XIFAXAN 550 MG TABS tablet TAKE 1 TABLET(550 MG) BY MOUTH TWICE DAILY 60 tablet 3         ALLERGIES:  Allergies   Allergen Reactions     Codeine Other (See Comments)     Cannot take due to liver  Cannot tolerate oral narcotics         PAST MEDICAL HISTORY:  Past Medical History:   Diagnosis Date     Anemia 2013    Low blood plates current is 37     Arthritis      BPH (benign prostatic hyperplasia)      Cholelithiasis      Conductive hearing loss 8/16/2017    Have a lump on my right side of my face.  Had wax discharge     Depressive disorder 1986    Suffer effects throughout life     Gastroesophageal reflux disease 12/1/2014    Being treated with Prilosac     Hepatitis 2014    Diagnosed with schrosis ESTRADA in 2014.  Suffer from hepatatie     HTN (hypertension)      Hyperlipidemia      Liver cirrhosis secondary to ESTRADA (H)       Thrombocytopenia (H)      Type II diabetes mellitus (H)     Insulin adminstered BID daily.          PAST SURGICAL HISTORY:  Past Surgical History:   Procedure Laterality Date     COLONOSCOPY      2015     ESOPHAGOSCOPY, GASTROSCOPY, DUODENOSCOPY (EGD), COMBINED N/A 11/17/2016    Procedure: COMBINED ESOPHAGOSCOPY, GASTROSCOPY, DUODENOSCOPY (EGD);  Surgeon: Santi Rosas MD;  Location: [facility]     ESOPHAGOSCOPY, GASTROSCOPY, DUODENOSCOPY (EGD), COMBINED N/A 11/17/2017    Procedure: COMBINED ESOPHAGOSCOPY, GASTROSCOPY, DUODENOSCOPY (EGD);  EGD;  Surgeon: Santi Rosas MD;  Location: [facility]     ESOPHAGOSCOPY, GASTROSCOPY, DUODENOSCOPY (EGD), COMBINED N/A 12/28/2018    Procedure: EGD;  Surgeon: Santi Rosas MD;  Location:  OR     HEAD & NECK SURGERY      12/2017 at [facility].      IMPLANT GOLD WEIGHT EYELID Right 11/16/2017    Procedure: IMPLANT WEIGHT EYELID;  Right Upper Eyelid Weight, right tarsal strip lower eyelid;  Surgeon: Milana Malave MD;  Location:  OR     KNEE SURGERY Left      ORTHOPEDIC SURGERY       PAROTIDECTOMY, RADICAL NECK DISSECTION Right 11/2/2017    Procedure: PAROTIDECTOMY, RADICAL NECK DISSECTION;  Right Superfacial Parotidectomy , Facial nerve repair. with [facility] facial nerve monitor.;  Surgeon: Asiya Morgan MD;  Location: [facility]     PICC INSERTION Left 11/06/2017    4fr SL BioFlo PICC, 44cm in the L basilic vein w/ tip in the low SVC     VASCULAR SURGERY           SOCIAL HISTORY:  Social History     Socioeconomic History     Marital status:      Spouse name: Not on file     Number of children: Not on file     Years of education: Not on file     Highest education level: Not on file   Social Needs     Financial resource strain: Not on file     Food insecurity - worry: Not on file     Food insecurity - inability: Not on file     Transportation needs - medical: Not on file     Transportation needs - non-medical: Not on file   Occupational History     Not on  "file   Tobacco Use     Smoking status: Never Smoker     Smokeless tobacco: Former User     Types: Chew     Tobacco comment: 1 tin per week   Substance and Sexual Activity     Alcohol use: No     Alcohol/week: 0.0 oz     Comment: quit 1996     Drug use: No     Sexual activity: Not Currently     Partners: Female     Birth control/protection: Condom   Other Topics Concern     Parent/sibling w/ CABG, MI or angioplasty before 65F 55M? Not Asked   Social History Narrative     Not on file         FAMILY HISTORY:  Family History   Problem Relation Age of Onset     Prostate Cancer Maternal Grandfather      Substance Abuse Maternal Grandfather         Alcohol     Colon Cancer Father 60     Pancreatic Cancer Father 60     Prostate Cancer Father      Colorectal Cancer Father      Macular Degeneration Father      Cancer Father      Glaucoma Father      Colorectal Cancer Maternal Grandmother      Cancer Maternal Grandmother      Substance Abuse Maternal Grandmother         Alcohol     Colorectal Cancer Paternal Grandmother      Cancer Mother      Diabetes Mother          3/2016     Cerebrovascular Disease Mother         Passed away in Feb of this year, 80 years old.     Thyroid Disease Mother      Depression Mother      Asthma Sister         Had since birth     Thyroid Disease Sister      Depression Sister      Liver Disease No family hx of          PHYSICAL EXAM:  Vital signs:  /61   Pulse 63   Temp 97.8  F (36.6  C) (Oral)   Resp 16   Ht 1.753 m (5' 9\")   Wt 85.7 kg (189 lb)   SpO2 100%   BMI 27.91 kg/m      ECO  GENERAL/CONSTITUTIONAL: No acute distress.  EYES: No scleral icterus.  RESPIRATORY: Clear to auscultation bilaterally. No crackles or wheezing.   CARDIOVASCULAR: Regular rate and rhythm without murmurs, gallops, or rubs.  GASTROINTESTINAL: No tenderness. The patient has normal bowel sounds. No guarding.    MUSCULOSKELETAL: Warm and well-perfused.  NEUROLOGIC: Alert, oriented, answers " questions appropriately.  INTEGUMENTARY: No jaundice.      LABS:  CBC RESULTS:   Recent Labs   Lab Test 12/28/18  1108   WBC 3.4*   RBC 4.04*   HGB 14.4   HCT 42.0   *   MCH 35.6*   MCHC 34.3   RDW 14.6   PLT 37*       Recent Labs   Lab Test 12/28/18  1108 10/15/18  1237   * 138   POTASSIUM 4.6 4.2   CHLORIDE 113* 110*   CO2 24 21   ANIONGAP 7 7   * 128*   BUN 21 28   CR 1.18 1.10   DEBORAH 8.8 8.4*     Lab Results   Component Value Date    AST 55 12/28/2018     Lab Results   Component Value Date    ALT 56 12/28/2018     No results found for: BILICONJ   Lab Results   Component Value Date    BILITOTAL 1.0 12/28/2018     Lab Results   Component Value Date    ALBUMIN 3.2 12/28/2018     Lab Results   Component Value Date    PROTTOTAL 7.7 12/28/2018      Lab Results   Component Value Date    ALKPHOS 145 12/28/2018     Component      Latest Ref Rng & Units 12/28/2018   Alpha Fetoprotein      0 - 8 ug/L 5.2     INR 1.30      IMAGING:  MRI abdomen 12/23/18:  1. Cirrhosis and evidence of portal hypertension.  2. There are 2 LIRADS 5 lesions measuring 3.1 cm and 1.1 cm in hepatic  segment IVb and 3, respectively.  3. No additional indeterminate or suspicious lesions.  4. Based on this exam only, the patient is not within [place] criteria.  5. Nonocclusive thrombus in the main portal vein. This is thought to  represent a bland thrombus and not tumor thrombus.    CT chest 1/4/19:  New diagnosis of hepatocellular carcinoma: No evidence of  metastatic disease to the chest. Incidental findings as described in  the body support.    Bone scan 1/4/19:  No osseous metastasis.       ASSESSMENT/PLAN:  Frandy Workman is a 53 year old male with:    1) Hepatocellular carcinoma: Child-Clark A (6).  We reviewed the recent imaging.  Patient has a 3.1 cm lesion in segment 4b and 1.1 cm lesion in segment 3.  His CT chest and bone scan were negative for metastatic disease.  He has already met with IR for evaluation of locoregional  disease, Dr. Scott.  He is planning to undergo TACE and ablation on 1/23/19.    -he will undergo treatment, with follow-up imaging and labs, as per IR  -he is undergoing liver transplant evaluation  -RTC in 3 months    2) Cirrhosis: secondary to ESTRADA  -follows with hepatology - Dr. Banuelos    3) Thrombocytopenia and leukopenia: This has been a chronic issue and likely secondary to his history of liver disease.  -will add on peripheral smear, vitamin B12, folate, peripheral flow to his next labs    4) Diabetes:   -follows with endocrinology      I spent a total of 60 minutes with the patient, with over >50% of the time in counseling and/or coordination of care.       Amanda Lees MD  Hematology/Oncology  [facility]

## 2019-01-14 NOTE — NURSING NOTE
"Oncology Rooming Note    January 14, 2019 10:42 AM   Frandy Workman is a 53 year old male who presents for:    Chief Complaint   Patient presents with     Oncology Clinic Visit     CIRRHOSIS OF LIVER     Initial Vitals: /61   Pulse 63   Temp 97.8  F (36.6  C) (Oral)   Resp 16   Ht 1.753 m (5' 9\")   Wt 85.7 kg (189 lb)   SpO2 100%   BMI 27.91 kg/m   Estimated body mass index is 27.91 kg/m  as calculated from the following:    Height as of this encounter: 1.753 m (5' 9\").    Weight as of this encounter: 85.7 kg (189 lb). Body surface area is 2.04 meters squared.  Moderate Pain (5) Comment: ABDOMEN   No LMP for male patient.  Allergies reviewed: Yes  Medications reviewed: Yes    Medications: Medication refills not needed today.  Pharmacy name entered into [facility]:    [id number] - [place], [place] - [address] [facility] [address] [place], [place] - [address] [facility]    Clinical concerns: No New Concerns    5 minutes for nursing intake (face to face time)     IRMA Lee      "

## 2019-01-15 RX ORDER — ECONAZOLE NITRATE 10 MG/G
CREAM TOPICAL DAILY
Qty: 85 G | Refills: 0 | Status: SHIPPED | OUTPATIENT
Start: 2019-01-15 | End: 2019-05-28

## 2019-01-18 RX ORDER — NICOTINE POLACRILEX 4 MG
15-30 LOZENGE BUCCAL
Status: CANCELLED | OUTPATIENT
Start: 2019-01-18

## 2019-01-18 RX ORDER — DEXTROSE MONOHYDRATE 25 G/50ML
25-50 INJECTION, SOLUTION INTRAVENOUS
Status: CANCELLED | OUTPATIENT
Start: 2019-01-18

## 2019-01-18 RX ORDER — LIDOCAINE 40 MG/G
CREAM TOPICAL
Status: CANCELLED | OUTPATIENT
Start: 2019-01-18

## 2019-01-18 RX ORDER — ONDANSETRON 2 MG/ML
4 INJECTION INTRAMUSCULAR; INTRAVENOUS ONCE
Status: CANCELLED | OUTPATIENT
Start: 2019-01-18 | End: 2019-01-18

## 2019-01-18 RX ORDER — CEFAZOLIN SODIUM 2 G/100ML
2 INJECTION, SOLUTION INTRAVENOUS
Status: CANCELLED | OUTPATIENT
Start: 2019-01-18

## 2019-01-18 RX ORDER — KETOROLAC TROMETHAMINE 30 MG/ML
30 INJECTION, SOLUTION INTRAMUSCULAR; INTRAVENOUS ONCE
Status: CANCELLED | OUTPATIENT
Start: 2019-01-18 | End: 2019-01-18

## 2019-01-18 RX ORDER — SODIUM CHLORIDE 9 MG/ML
INJECTION, SOLUTION INTRAVENOUS CONTINUOUS
Status: CANCELLED | OUTPATIENT
Start: 2019-01-18

## 2019-01-22 ENCOUNTER — ANESTHESIA EVENT (OUTPATIENT)
Dept: SURGERY | Facility: CLINIC | Age: 54
End: 2019-01-22
Payer: MEDICARE

## 2019-01-22 ENCOUNTER — HOSPITAL ENCOUNTER (OUTPATIENT)
Facility: CLINIC | Age: 54
Setting detail: OBSERVATION
Discharge: HOME | End: 2019-01-23
Attending: RADIOLOGY | Admitting: INTERNAL MEDICINE
Payer: MEDICARE

## 2019-01-22 ENCOUNTER — APPOINTMENT (OUTPATIENT)
Dept: MEDSURG UNIT | Facility: CLINIC | Age: 54
End: 2019-01-22
Attending: RADIOLOGY
Payer: MEDICARE

## 2019-01-22 ENCOUNTER — APPOINTMENT (OUTPATIENT)
Dept: INTERVENTIONAL RADIOLOGY/VASCULAR | Facility: CLINIC | Age: 54
End: 2019-01-22
Attending: RADIOLOGY
Payer: MEDICARE

## 2019-01-22 DIAGNOSIS — J93.12 SECONDARY SPONTANEOUS PNEUMOTHORAX: Primary | ICD-10-CM

## 2019-01-22 DIAGNOSIS — C22.0 HCC (HEPATOCELLULAR CARCINOMA) (H): ICD-10-CM

## 2019-01-22 LAB
ALBUMIN SERPL-MCNC: 3.3 G/DL (ref 3.4–5)
ALP SERPL-CCNC: 158 U/L (ref 40–150)
ALT SERPL W P-5'-P-CCNC: 45 U/L (ref 0–70)
ANION GAP SERPL CALCULATED.3IONS-SCNC: 8 MMOL/L (ref 3–14)
APTT PPP: 32 SEC (ref 22–37)
AST SERPL W P-5'-P-CCNC: 50 U/L (ref 0–45)
BILIRUB DIRECT SERPL-MCNC: 0.4 MG/DL (ref 0–0.2)
BILIRUB SERPL-MCNC: 1.5 MG/DL (ref 0.2–1.3)
BUN SERPL-MCNC: 19 MG/DL (ref 7–30)
CALCIUM SERPL-MCNC: 8.5 MG/DL (ref 8.5–10.1)
CHLORIDE SERPL-SCNC: 110 MMOL/L (ref 94–109)
CO2 SERPL-SCNC: 26 MMOL/L (ref 20–32)
CREAT SERPL-MCNC: 1.06 MG/DL (ref 0.66–1.25)
ERYTHROCYTE [DISTWIDTH] IN BLOOD BY AUTOMATED COUNT: 14.4 % (ref 10–15)
GFR SERPL CREATININE-BSD FRML MDRD: 79 ML/MIN/{1.73_M2}
GLUCOSE BLDC GLUCOMTR-MCNC: 103 MG/DL (ref 70–99)
GLUCOSE BLDC GLUCOMTR-MCNC: 163 MG/DL (ref 70–99)
GLUCOSE SERPL-MCNC: 154 MG/DL (ref 70–99)
HCT VFR BLD AUTO: 46.9 % (ref 40–53)
HGB BLD-MCNC: 15.9 G/DL (ref 13.3–17.7)
INR PPP: 1.24 (ref 0.86–1.14)
MCH RBC QN AUTO: 36.4 PG (ref 26.5–33)
MCHC RBC AUTO-ENTMCNC: 33.9 G/DL (ref 31.5–36.5)
MCV RBC AUTO: 107 FL (ref 78–100)
PLATELET # BLD AUTO: 38 10E9/L (ref 150–450)
POTASSIUM SERPL-SCNC: 4.2 MMOL/L (ref 3.4–5.3)
PROT SERPL-MCNC: 7.9 G/DL (ref 6.8–8.8)
RBC # BLD AUTO: 4.37 10E12/L (ref 4.4–5.9)
SODIUM SERPL-SCNC: 143 MMOL/L (ref 133–144)
WBC # BLD AUTO: 3.8 10E9/L (ref 4–11)

## 2019-01-22 PROCEDURE — C1887 CATHETER, GUIDING: HCPCS

## 2019-01-22 PROCEDURE — 25000125 ZZHC RX 250: Performed by: PHYSICIAN ASSISTANT

## 2019-01-22 PROCEDURE — 40000172 ZZH STATISTIC PROCEDURE PREP ONLY

## 2019-01-22 PROCEDURE — 75774 ARTERY X-RAY EACH VESSEL: CPT | Mod: XS

## 2019-01-22 PROCEDURE — 99220 ZZC INITIAL OBSERVATION CARE,LEVL III: CPT | Mod: AI | Performed by: INTERNAL MEDICINE

## 2019-01-22 PROCEDURE — 85027 COMPLETE CBC AUTOMATED: CPT | Performed by: RADIOLOGY

## 2019-01-22 PROCEDURE — 80048 BASIC METABOLIC PNL TOTAL CA: CPT | Performed by: RADIOLOGY

## 2019-01-22 PROCEDURE — 00000146 ZZHCL STATISTIC GLUCOSE BY METER IP

## 2019-01-22 PROCEDURE — 27210908 ZZH NEEDLE CR4

## 2019-01-22 PROCEDURE — 25000128 H RX IP 250 OP 636: Performed by: RADIOLOGY

## 2019-01-22 PROCEDURE — 85610 PROTHROMBIN TIME: CPT | Performed by: RADIOLOGY

## 2019-01-22 PROCEDURE — 27210732 ZZH ACCESSORY CR1

## 2019-01-22 PROCEDURE — 25000125 ZZHC RX 250: Performed by: RADIOLOGY

## 2019-01-22 PROCEDURE — 75726 ARTERY X-RAYS ABDOMEN: CPT | Mod: XU

## 2019-01-22 PROCEDURE — 36247 INS CATH ABD/L-EXT ART 3RD: CPT

## 2019-01-22 PROCEDURE — A9270 NON-COVERED ITEM OR SERVICE: HCPCS | Mod: GY | Performed by: PHYSICIAN ASSISTANT

## 2019-01-22 PROCEDURE — 25000132 ZZH RX MED GY IP 250 OP 250 PS 637: Mod: GY

## 2019-01-22 PROCEDURE — 27210804 ZZH SHEATH CR3

## 2019-01-22 PROCEDURE — 27210905 ZZH KIT CR7

## 2019-01-22 PROCEDURE — C1769 GUIDE WIRE: HCPCS

## 2019-01-22 PROCEDURE — 37243 VASC EMBOLIZE/OCCLUDE ORGAN: CPT

## 2019-01-22 PROCEDURE — 25500064 ZZH RX 255 OP 636: Performed by: RADIOLOGY

## 2019-01-22 PROCEDURE — 99152 MOD SED SAME PHYS/QHP 5/>YRS: CPT

## 2019-01-22 PROCEDURE — 25000128 H RX IP 250 OP 636: Performed by: PHYSICIAN ASSISTANT

## 2019-01-22 PROCEDURE — 27810149 ZZH COIL/EMBOLIC DEVICE CR12

## 2019-01-22 PROCEDURE — 80076 HEPATIC FUNCTION PANEL: CPT | Performed by: RADIOLOGY

## 2019-01-22 PROCEDURE — C1760 CLOSURE DEV, VASC: HCPCS

## 2019-01-22 PROCEDURE — 25000132 ZZH RX MED GY IP 250 OP 250 PS 637: Mod: GY | Performed by: PHYSICIAN ASSISTANT

## 2019-01-22 PROCEDURE — 96420 CHEMO IA PUSH TECNIQUE: CPT

## 2019-01-22 PROCEDURE — 99153 MOD SED SAME PHYS/QHP EA: CPT

## 2019-01-22 PROCEDURE — G0378 HOSPITAL OBSERVATION PER HR: HCPCS

## 2019-01-22 PROCEDURE — A9270 NON-COVERED ITEM OR SERVICE: HCPCS | Mod: GY

## 2019-01-22 PROCEDURE — 27210780 ZZH KIT CR3

## 2019-01-22 PROCEDURE — 85730 THROMBOPLASTIN TIME PARTIAL: CPT | Performed by: RADIOLOGY

## 2019-01-22 RX ORDER — CARVEDILOL 12.5 MG/1
12.5 TABLET ORAL 2 TIMES DAILY WITH MEALS
Status: DISCONTINUED | OUTPATIENT
Start: 2019-01-22 | End: 2019-01-23 | Stop reason: HOSPADM

## 2019-01-22 RX ORDER — ACETAMINOPHEN 325 MG/1
650 TABLET ORAL EVERY 4 HOURS PRN
Status: DISCONTINUED | OUTPATIENT
Start: 2019-01-22 | End: 2019-01-23 | Stop reason: HOSPADM

## 2019-01-22 RX ORDER — NICOTINE POLACRILEX 4 MG
15-30 LOZENGE BUCCAL
Status: DISCONTINUED | OUTPATIENT
Start: 2019-01-22 | End: 2019-01-23 | Stop reason: HOSPADM

## 2019-01-22 RX ORDER — SCOLOPAMINE TRANSDERMAL SYSTEM 1 MG/1
1 PATCH, EXTENDED RELEASE TRANSDERMAL ONCE
Status: COMPLETED | OUTPATIENT
Start: 2019-01-22 | End: 2019-01-22

## 2019-01-22 RX ORDER — DEXTROSE MONOHYDRATE 25 G/50ML
25-50 INJECTION, SOLUTION INTRAVENOUS
Status: DISCONTINUED | OUTPATIENT
Start: 2019-01-22 | End: 2019-01-22 | Stop reason: HOSPADM

## 2019-01-22 RX ORDER — POTASSIUM CHLORIDE 750 MG/1
20 TABLET, EXTENDED RELEASE ORAL DAILY
Status: DISCONTINUED | OUTPATIENT
Start: 2019-01-23 | End: 2019-01-23 | Stop reason: HOSPADM

## 2019-01-22 RX ORDER — DEXTROSE MONOHYDRATE 25 G/50ML
25-50 INJECTION, SOLUTION INTRAVENOUS
Status: DISCONTINUED | OUTPATIENT
Start: 2019-01-22 | End: 2019-01-23 | Stop reason: HOSPADM

## 2019-01-22 RX ORDER — NICOTINE POLACRILEX 4 MG
15-30 LOZENGE BUCCAL
Status: DISCONTINUED | OUTPATIENT
Start: 2019-01-22 | End: 2019-01-22

## 2019-01-22 RX ORDER — NICOTINE POLACRILEX 4 MG
15-30 LOZENGE BUCCAL
Status: DISCONTINUED | OUTPATIENT
Start: 2019-01-22 | End: 2019-01-22 | Stop reason: HOSPADM

## 2019-01-22 RX ORDER — SODIUM CHLORIDE 9 MG/ML
INJECTION, SOLUTION INTRAVENOUS CONTINUOUS
Status: DISCONTINUED | OUTPATIENT
Start: 2019-01-22 | End: 2019-01-22

## 2019-01-22 RX ORDER — NALOXONE HYDROCHLORIDE 0.4 MG/ML
.1-.4 INJECTION, SOLUTION INTRAMUSCULAR; INTRAVENOUS; SUBCUTANEOUS
Status: DISCONTINUED | OUTPATIENT
Start: 2019-01-22 | End: 2019-01-23 | Stop reason: HOSPADM

## 2019-01-22 RX ORDER — AMPICILLIN AND SULBACTAM 2; 1 G/1; G/1
3 INJECTION, POWDER, FOR SOLUTION INTRAMUSCULAR; INTRAVENOUS
Status: COMPLETED | OUTPATIENT
Start: 2019-01-22 | End: 2019-01-22

## 2019-01-22 RX ORDER — PROCHLORPERAZINE MALEATE 5 MG
5 TABLET ORAL EVERY 6 HOURS PRN
Status: DISCONTINUED | OUTPATIENT
Start: 2019-01-22 | End: 2019-01-23 | Stop reason: HOSPADM

## 2019-01-22 RX ORDER — DOCUSATE SODIUM 100 MG/1
100 CAPSULE, LIQUID FILLED ORAL 2 TIMES DAILY
Status: DISCONTINUED | OUTPATIENT
Start: 2019-01-22 | End: 2019-01-23 | Stop reason: HOSPADM

## 2019-01-22 RX ORDER — SODIUM CHLORIDE 9 MG/ML
INJECTION, SOLUTION INTRAVENOUS CONTINUOUS
Status: DISCONTINUED | OUTPATIENT
Start: 2019-01-22 | End: 2019-01-22 | Stop reason: HOSPADM

## 2019-01-22 RX ORDER — CEFAZOLIN SODIUM 2 G/100ML
2 INJECTION, SOLUTION INTRAVENOUS
Status: DISCONTINUED | OUTPATIENT
Start: 2019-01-22 | End: 2019-01-22 | Stop reason: HOSPADM

## 2019-01-22 RX ORDER — LIDOCAINE 40 MG/G
CREAM TOPICAL
Status: DISCONTINUED | OUTPATIENT
Start: 2019-01-22 | End: 2019-01-22 | Stop reason: HOSPADM

## 2019-01-22 RX ORDER — LACTULOSE 10 G/15ML
30 SOLUTION ORAL 4 TIMES DAILY
Status: DISCONTINUED | OUTPATIENT
Start: 2019-01-22 | End: 2019-01-23 | Stop reason: HOSPADM

## 2019-01-22 RX ORDER — ONDANSETRON 4 MG/1
8 TABLET, ORALLY DISINTEGRATING ORAL EVERY 8 HOURS PRN
Status: DISCONTINUED | OUTPATIENT
Start: 2019-01-22 | End: 2019-01-23 | Stop reason: HOSPADM

## 2019-01-22 RX ORDER — ERYTHROMYCIN 5 MG/G
OINTMENT OPHTHALMIC 3 TIMES DAILY
Status: DISCONTINUED | OUTPATIENT
Start: 2019-01-22 | End: 2019-01-23 | Stop reason: HOSPADM

## 2019-01-22 RX ORDER — POTASSIUM CHLORIDE 29.8 MG/ML
20 INJECTION INTRAVENOUS
Status: DISCONTINUED | OUTPATIENT
Start: 2019-01-22 | End: 2019-01-23 | Stop reason: HOSPADM

## 2019-01-22 RX ORDER — KETOROLAC TROMETHAMINE 30 MG/ML
30 INJECTION, SOLUTION INTRAMUSCULAR; INTRAVENOUS ONCE
Status: DISCONTINUED | OUTPATIENT
Start: 2019-01-22 | End: 2019-01-22 | Stop reason: HOSPADM

## 2019-01-22 RX ORDER — TAMSULOSIN HYDROCHLORIDE 0.4 MG/1
0.4 CAPSULE ORAL EVERY EVENING
Status: DISCONTINUED | OUTPATIENT
Start: 2019-01-22 | End: 2019-01-23 | Stop reason: HOSPADM

## 2019-01-22 RX ORDER — FENTANYL CITRATE 50 UG/ML
25-50 INJECTION, SOLUTION INTRAMUSCULAR; INTRAVENOUS EVERY 5 MIN PRN
Status: DISCONTINUED | OUTPATIENT
Start: 2019-01-22 | End: 2019-01-22 | Stop reason: HOSPADM

## 2019-01-22 RX ORDER — DEXTROSE MONOHYDRATE 25 G/50ML
25-50 INJECTION, SOLUTION INTRAVENOUS
Status: DISCONTINUED | OUTPATIENT
Start: 2019-01-22 | End: 2019-01-22

## 2019-01-22 RX ORDER — ONDANSETRON 2 MG/ML
8 INJECTION INTRAMUSCULAR; INTRAVENOUS EVERY 8 HOURS PRN
Status: DISCONTINUED | OUTPATIENT
Start: 2019-01-22 | End: 2019-01-23 | Stop reason: HOSPADM

## 2019-01-22 RX ORDER — ONDANSETRON 8 MG/1
8 TABLET, FILM COATED ORAL EVERY 8 HOURS PRN
Status: DISCONTINUED | OUTPATIENT
Start: 2019-01-22 | End: 2019-01-23 | Stop reason: HOSPADM

## 2019-01-22 RX ORDER — OLANZAPINE 2.5 MG/1
2.5 TABLET, FILM COATED ORAL AT BEDTIME
Status: DISCONTINUED | OUTPATIENT
Start: 2019-01-22 | End: 2019-01-23 | Stop reason: HOSPADM

## 2019-01-22 RX ORDER — POTASSIUM CHLORIDE 7.45 MG/ML
10 INJECTION INTRAVENOUS
Status: DISCONTINUED | OUTPATIENT
Start: 2019-01-22 | End: 2019-01-23 | Stop reason: HOSPADM

## 2019-01-22 RX ORDER — NALOXONE HYDROCHLORIDE 0.4 MG/ML
.1-.4 INJECTION, SOLUTION INTRAMUSCULAR; INTRAVENOUS; SUBCUTANEOUS
Status: DISCONTINUED | OUTPATIENT
Start: 2019-01-22 | End: 2019-01-22

## 2019-01-22 RX ORDER — POTASSIUM CHLORIDE 750 MG/1
20-40 TABLET, EXTENDED RELEASE ORAL
Status: DISCONTINUED | OUTPATIENT
Start: 2019-01-22 | End: 2019-01-23 | Stop reason: HOSPADM

## 2019-01-22 RX ORDER — SODIUM CHLORIDE 9 MG/ML
INJECTION, SOLUTION INTRAVENOUS CONTINUOUS
Status: DISCONTINUED | OUTPATIENT
Start: 2019-01-22 | End: 2019-01-23 | Stop reason: HOSPADM

## 2019-01-22 RX ORDER — POTASSIUM CL/LIDO/0.9 % NACL 10MEQ/0.1L
10 INTRAVENOUS SOLUTION, PIGGYBACK (ML) INTRAVENOUS
Status: DISCONTINUED | OUTPATIENT
Start: 2019-01-22 | End: 2019-01-23 | Stop reason: HOSPADM

## 2019-01-22 RX ORDER — ONDANSETRON 2 MG/ML
4 INJECTION INTRAMUSCULAR; INTRAVENOUS ONCE
Status: COMPLETED | OUTPATIENT
Start: 2019-01-22 | End: 2019-01-22

## 2019-01-22 RX ORDER — FLUMAZENIL 0.1 MG/ML
0.2 INJECTION, SOLUTION INTRAVENOUS
Status: DISCONTINUED | OUTPATIENT
Start: 2019-01-22 | End: 2019-01-22 | Stop reason: HOSPADM

## 2019-01-22 RX ORDER — POTASSIUM CHLORIDE 1.5 G/1.58G
20-40 POWDER, FOR SOLUTION ORAL
Status: DISCONTINUED | OUTPATIENT
Start: 2019-01-22 | End: 2019-01-23 | Stop reason: HOSPADM

## 2019-01-22 RX ORDER — NALOXONE HYDROCHLORIDE 0.4 MG/ML
.1-.4 INJECTION, SOLUTION INTRAMUSCULAR; INTRAVENOUS; SUBCUTANEOUS
Status: DISCONTINUED | OUTPATIENT
Start: 2019-01-22 | End: 2019-01-22 | Stop reason: HOSPADM

## 2019-01-22 RX ORDER — OXYCODONE HYDROCHLORIDE 5 MG/1
5 TABLET ORAL EVERY 4 HOURS PRN
Status: DISCONTINUED | OUTPATIENT
Start: 2019-01-23 | End: 2019-01-23 | Stop reason: HOSPADM

## 2019-01-22 RX ORDER — IODIXANOL 320 MG/ML
150 INJECTION, SOLUTION INTRAVASCULAR ONCE
Status: COMPLETED | OUTPATIENT
Start: 2019-01-22 | End: 2019-01-22

## 2019-01-22 RX ORDER — ONDANSETRON 2 MG/ML
4 INJECTION INTRAMUSCULAR; INTRAVENOUS ONCE
Status: DISCONTINUED | OUTPATIENT
Start: 2019-01-22 | End: 2019-01-22 | Stop reason: HOSPADM

## 2019-01-22 RX ADMIN — LACTULOSE 30 G: 20 SOLUTION ORAL at 16:55

## 2019-01-22 RX ADMIN — OLANZAPINE 2.5 MG: 2.5 TABLET, FILM COATED ORAL at 22:12

## 2019-01-22 RX ADMIN — SODIUM CHLORIDE: 9 INJECTION, SOLUTION INTRAVENOUS at 16:56

## 2019-01-22 RX ADMIN — MITOMYCIN: 5 INJECTION, POWDER, LYOPHILIZED, FOR SOLUTION INTRAVENOUS at 14:19

## 2019-01-22 RX ADMIN — LACTULOSE 30 G: 20 SOLUTION ORAL at 20:06

## 2019-01-22 RX ADMIN — DOCUSATE SODIUM 100 MG: 100 CAPSULE, LIQUID FILLED ORAL at 20:05

## 2019-01-22 RX ADMIN — MIDAZOLAM HYDROCHLORIDE 2 MG: 2 INJECTION, SOLUTION INTRAMUSCULAR; INTRAVENOUS at 14:47

## 2019-01-22 RX ADMIN — TAMSULOSIN HYDROCHLORIDE 0.4 MG: 0.4 CAPSULE ORAL at 20:05

## 2019-01-22 RX ADMIN — RIFAXIMIN 550 MG: 550 TABLET ORAL at 20:05

## 2019-01-22 RX ADMIN — ERYTHROMYCIN 1 G: 5 OINTMENT OPHTHALMIC at 20:05

## 2019-01-22 RX ADMIN — Medication: at 22:12

## 2019-01-22 RX ADMIN — CARVEDILOL 12.5 MG: 12.5 TABLET, FILM COATED ORAL at 17:01

## 2019-01-22 RX ADMIN — ONDANSETRON 4 MG: 2 INJECTION INTRAMUSCULAR; INTRAVENOUS at 11:13

## 2019-01-22 RX ADMIN — DEXTRAN 70, AND HYPROMELLOSE 2910 1 DROP: 1; 3 SOLUTION/ DROPS OPHTHALMIC at 22:12

## 2019-01-22 RX ADMIN — LIDOCAINE HYDROCHLORIDE 4 ML: 10 INJECTION, SOLUTION EPIDURAL; INFILTRATION; INTRACAUDAL; PERINEURAL at 14:47

## 2019-01-22 RX ADMIN — Medication: at 14:33

## 2019-01-22 RX ADMIN — HYDROCORTISONE SODIUM SUCCINATE 100 MG: 100 INJECTION, POWDER, FOR SOLUTION INTRAMUSCULAR; INTRAVENOUS at 11:16

## 2019-01-22 RX ADMIN — DEXTRAN 70, AND HYPROMELLOSE 2910 1 DROP: 1; 3 SOLUTION/ DROPS OPHTHALMIC at 20:05

## 2019-01-22 RX ADMIN — IODIXANOL 85 ML: 320 INJECTION, SOLUTION INTRAVASCULAR at 15:09

## 2019-01-22 RX ADMIN — SCOPOLAMINE 1 PATCH: 1 PATCH, EXTENDED RELEASE TRANSDERMAL at 11:12

## 2019-01-22 RX ADMIN — AMPICILLIN SODIUM AND SULBACTAM SODIUM 3 G: 2; 1 INJECTION, POWDER, FOR SOLUTION INTRAMUSCULAR; INTRAVENOUS at 11:19

## 2019-01-22 RX ADMIN — SODIUM CHLORIDE: 9 INJECTION, SOLUTION INTRAVENOUS at 11:08

## 2019-01-22 RX ADMIN — FENTANYL CITRATE 100 MCG: 50 INJECTION, SOLUTION INTRAMUSCULAR; INTRAVENOUS at 14:46

## 2019-01-22 ASSESSMENT — COLUMBIA-SUICIDE SEVERITY RATING SCALE - C-SSRS
1. IN THE PAST MONTH, HAVE YOU WISHED YOU WERE DEAD OR WISHED YOU COULD GO TO SLEEP AND NOT WAKE UP?: NO
6. HAVE YOU EVER DONE ANYTHING, STARTED TO DO ANYTHING, OR PREPARED TO DO ANYTHING TO END YOUR LIFE?: NO
2. HAVE YOU ACTUALLY HAD ANY THOUGHTS OF KILLING YOURSELF IN THE PAST MONTH?: NO

## 2019-01-22 NOTE — PROGRESS NOTES
Pt arrived to 6D from IR, alert and oriented x4. He was transferred to his bed and oriented to the room. The puncture site is c/d/i, flat bedrest till 1805.

## 2019-01-22 NOTE — PROGRESS NOTES
Arrived from home for a TACE.  VSS.  Denies pain.  H&P current.  PIV placed and labs sent.  Resting in bed.  Consent being obtained now.  Will be ready for procedure when labs are resulted.

## 2019-01-22 NOTE — IR NOTE
Patient Name: Frandy Workman  Medical Record Number: [id number]  Today's Date: 1/22/2019    Procedure: transarterial chemoembolization   Proceduralist: Efra Scott and Eleuterio    Sedation start time: 1340    Sedation end time: 1510  Sedation medications administered: versed  2 Mg., fentanyl  100 Mcg.  Total sedation time: 90 minutes  Sedation Notes: RFA puncture site closed with  A Mynx device, extra pressure held x 10 minutes for firmness over pucture area.    Procedure start time: 1345  Puncture time: 1346  Procedure end time: 1525    Report given to: 6D RN    Other Notes: Pt arrived to IR room 4  from 2A. Consent reviewed. Pt denies any questions or concerns regarding procedure. Pt positioned  Supine  and monitored per protocol. Pt tolerated procedure without any noted complications. Pt transferred back to 6D..

## 2019-01-22 NOTE — PROGRESS NOTES
Interventional Radiology Pre-Procedure Sedation Assessment   Time of Assessment: 11:40 AM    Expected Level: Moderate Sedation    Indication: Sedation is required for the following type of Procedure: Arterial    Sedation and procedural consent: Risks, benefits and alternatives were discussed with Patient    PO Intake: Appropriately NPO for procedure    ASA Class: Class 3 - SEVERE SYSTEMIC DISEASE, DEFINITE FUNCTIONAL LIMITATIONS.    Mallampati: Grade 2:  Soft palate, base of uvula, tonsillar pillars, and portion of posterior pharyngeal wall visible    Lungs: Lungs Clear with good breath sounds bilaterally    Heart: Normal heart sounds and rate    History and physical reviewed and no updates needed. I have reviewed the lab findings, diagnostic data, medications, and the plan for sedation. I have determined this patient to be an appropriate candidate for the planned sedation and procedure and have reassessed the patient IMMEDIATELY PRIOR to sedation and procedure.    Benigno Scott MD

## 2019-01-22 NOTE — H&P
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       #HCC   Follows with Dr. Lees and Dr. Scott. He underwent routine screening ultrasound on 12/10/18, which showed a non-specific left hepatic lobe lesion. MRI abdomen on 12/23/19 showed 2 lesions, measuring 3.1 cm and 1.1 cm in hepatic segments 4b and 3, that were LIRADS 5. 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 CT chest and bone scan without metastatic disease. He was referred to IR for liver-directed therapy and started transplant evaluation. Established care with Dr. Lees on 1/14/19. He now presents for observation s/p TACE on 1/22/19 for observation. Of note, patient will undergo a Y90 ablation on 1/23.   - Admit to observation overnight.  - Monitor for increased pain, nausea/vomiting, bleeding, fever/chills, confusion and decreased RLE distal pulse.  - Dilaudid PCA for pain control.  Switch to oral medication in AM as tolerated.  - Compazine and zofran PO IV for nausea.  - 0.9% NS at 75 cc/hr until tolerating diet.  - Bedrest with RLE straight x 3hrs post procedure. Up with assistance/ad karely as tolerated after.  - CBC/CMP in AM.  - Will get CT abdomen w/o contrast with Y90 ablation on 1/23   - IR clinic appointment after one month with pre-clinic MRI liver.    #Cirrhosis 2/2 ESTRADA  Per pt, he was on the transplant list at [place] when he lived in [place].  He follows with Dr. Banuelos here in the hepatology clinic now.    - Continue PTA Lactulose and Rifaximin    #DMII  - Hold PTA Farxiga and Tresiba  - Carb scale and high SSI    #HTN  - Continue Carvedilol 12.5 mg BID    #BPH  - Continue  Flomax    #OPHTHO  - Continue PTA eye drops    FEN  - IVF 75 mL/hr  - CLD then adat (will then be NPO after midnight for Y90 tomorrow)    PPx  - VTE: None post-procedure  - GI: PTA     Code status: Full code    Disposition: Plan to discharge patient tomorrow after Y90 ablation if hemodynamically stable, without encephalopathy, and pain/nausea well controlled.      This plan of care has been discussed with Dr. Ramesh.    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 He is doing well post-op. No pain and HD stable. He is uncomfortable laying flat, but is breathing without difficulty. Is asking when he can eat. Discussed plan for ablation tomorrow, which he was aware of. Patient otherwise feels well and has no additional questions.    Past Medical History    I have reviewed this patient's medical history and updated it with pertinent information if needed.   Past Medical History:   Diagnosis Date     Anemia 2013    Low blood plates current is 37     Arthritis      BPH (benign prostatic hyperplasia)      Cholelithiasis      Conductive hearing loss 8/16/2017    Have a lump on my right side of my face.  Had wax discharge     Depressive disorder 1986    Suffer effects throughout life     Gastroesophageal reflux disease 12/1/2014    Being treated with Prilosac     Hepatitis 2014    Diagnosed with schrosis ESTRADA in 2014.  Suffer from hepatatie     HTN (hypertension)      Hyperlipidemia      Liver cirrhosis secondary to ESTRADA (H)      Thrombocytopenia (H)      Type II diabetes mellitus (H)     Insulin adminstered BID  daily.        Past Surgical History   I have reviewed this patient's surgical history and updated it with pertinent information if needed.  Past Surgical History:   Procedure Laterality Date     COLONOSCOPY      2015     ESOPHAGOSCOPY, GASTROSCOPY, DUODENOSCOPY (EGD), COMBINED N/A 11/17/2016    Procedure: COMBINED ESOPHAGOSCOPY, GASTROSCOPY, DUODENOSCOPY (EGD);  Surgeon: Santi Rosas MD;  Location: [facility]     ESOPHAGOSCOPY, GASTROSCOPY, DUODENOSCOPY (EGD), COMBINED N/A 11/17/2017    Procedure: COMBINED ESOPHAGOSCOPY, GASTROSCOPY, DUODENOSCOPY (EGD);  EGD;  Surgeon: Santi Rosas MD;  [facility]: [facility]     ESOPHAGOSCOPY, GASTROSCOPY, DUODENOSCOPY (EGD), COMBINED N/A 12/28/2018    Procedure: EGD;  Surgeon: Santi Rosas MD;  Location:  OR     HEAD & NECK SURGERY      12/2017 at [facility].      IMPLANT GOLD WEIGHT EYELID Right 11/16/2017    Procedure: IMPLANT WEIGHT EYELID;  Right Upper Eyelid Weight, right tarsal strip lower eyelid;  Surgeon: Milana Malave MD;  Location: [facility] OR     KNEE SURGERY Left      ORTHOPEDIC SURGERY       PAROTIDECTOMY, RADICAL NECK DISSECTION Right 11/2/2017    Procedure: PAROTIDECTOMY, RADICAL NECK DISSECTION;  Right Superfacial Parotidectomy , Facial nerve repair. with [facility] facial nerve monitor.;  Surgeon: Asiya Morgan MD;  Location: [facility] OR     PICC INSERTION Left 11/06/2017    4fr SL BioFlo PICC, 44cm in the L basilic vein w/ tip in the low SVC     VASCULAR SURGERY         Prior to Admission Medications   Prior to Admission Medications   Prescriptions Last Dose Informant Patient Reported? Taking?   ACCU-CHEK EDINSON PLUS test strip 1/22/2019 at Unknown time  No Yes   Sig: USE TO TEST BLOOD SUGARS FOUR TIMES DAILY OR AS DIRECTED   Artificial Tear Ointment (REFRESH LACRI-LUBE) OINT Past Week at Unknown time  No Yes   Sig: Apply 1 Application to eye At Bedtime   Artificial Tear Solution (SM ARTIFICIAL TEARS) SOLN Past Week at Unknown time  No Yes   Sig: Place 1  drop into the right eye every hour as needed Apply at least 4 times daily and as needed for dry eye   BD VIKTORIA U/F 32G X 4 MM insulin pen needle 2019 at Unknown time  No Yes   Sig: Use 5 per day   Cholecalciferol (VITAMIN D-3 PO) 2019 at Unknown time  Yes Yes   Sig: Take 2,000 Units by mouth every morning    IRON PO 2019 at 0800  Yes Yes   Sig: Take by mouth daily   Multiple Vitamin (THERAVITE PO) 2019 at 0800  Yes Yes   Sig: Take 1 tablet by mouth every morning    NOVOLOG FLEXPEN 100 UNIT/ML soln 2019 at Unknown time  No Yes   Sig: INJECT 1 UNIT PER 4 GRAMS OF CARBS AT MEALS AND SNACKS. CORRECTION SCALE OF 1 UNITS PER 25 OVER [address] DOSE 75 UNITERS PER DAY   OLANZapine (ZYPREXA) 2.5 MG tablet Past Week at Unknown time  No Yes   Sig: Take 1 tablet (2.5 mg) by mouth At Bedtime   Propylene Glycol-Glycerin (ARTIFICIAL TEARS) 1-0.3 % SOLN 2019 at Unknown time  No Yes   Sig: Apply 1 drop to eye every 2 hours (while awake)   UNABLE TO FIND   Yes No   Si times daily ULTRA MALE ENHANCEMENT POTENCY   XIFAXAN 550 MG TABS tablet 2019 at 0800  No Yes   Sig: TAKE 1 TABLET(550 MG) BY MOUTH TWICE DAILY   aspirin (ASPIRIN LOW DOSE) 81 MG EC tablet Past Week at Unknown time  No Yes   Sig: Take 1 tablet (81 mg) by mouth daily   blood glucose monitoring (ACCU-CHEK EDINSON PLUS) test strip 2019 at Unknown time  No Yes   Sig: Use to test blood sugar 4 times daily   blood glucose monitoring (ACCU-CHEK EDINSON PLUS) test strip 2019 at Unknown time  No Yes   Sig: USE TO TEST BLOOD SUGARS FOUR TIMES DAILY OR AS DIRECTED   blood glucose monitoring (ACCU-CHEK EDINSON PLUS) test strip 2019 at Unknown time  No Yes   Sig: USE TO TEST BLOOD SUGARS FOUR TIMES DAILY OR AS DIRECTED   blood glucose monitoring (ACCU-CHEK FASTCLIX) lancets 2019 at Unknown time  No Yes   Sig: Use to test blood sugar 4 times daily or as directed.  1 box = 102 lancets   capsaicin (ZOSTRIX) 0.025 % CREA cream Past Week  at Unknown time  No Yes   Sig: To feet 2-3 times daily as needed.   carvedilol (COREG) 12.5 MG tablet 1/22/2019 at 0800  No Yes   Sig: TAKE 1 TABLET(12.5 MG) BY MOUTH TWICE DAILY WITH MEALS   cyanocobalamin (RA VITAMIN B-12 TR) 1000 MCG TBCR 1/22/2019 at 0800  Yes Yes   Sig: Take 1,000 mcg by mouth every morning    dapagliflozin (FARXIGA) 10 MG TABS tablet 1/21/2019 at Unknown time  No Yes   Sig: Take 1 tablet (10 mg) by mouth daily   econazole nitrate 1 % external cream Past Week at Unknown time  No Yes   Sig: APPLY TOPICALLY DAILY TO FEET AND TOENAILS   erythromycin (ROMYCIN) ophthalmic ointment 1/22/2019 at 0800  No Yes   Sig: Place into the right eye 3 times daily   insulin degludec (TRESIBA) 200 UNIT/ML pen 1/22/2019 at 0830  No Yes   Sig: Take 100 units daily.   lactulose (CHRONULAC) 10 GM/15ML solution 1/22/2019 at 08/00  No Yes   Sig: Take 45 mLs (30 g) by mouth 4 times daily   omeprazole (PRILOSEC) 40 MG capsule 1/22/2019 at 0800  No Yes   Sig: Take 1 capsule (40 mg) by mouth daily   potassium chloride SA (K-DUR/KLOR-CON M) 20 MEQ CR tablet 1/22/2019 at 0800  No Yes   Sig: Take 1 tablet (20 mEq) by mouth daily   pravastatin (PRAVACHOL) 20 MG tablet 1/21/2019 at 2200  No Yes   Sig: Take 1 tablet (20 mg) by mouth daily   rifaximin (XIFAXAN) 550 MG TABS tablet 1/22/2019 at 0800  No Yes   Sig: Take 1 tablet (550 mg) by mouth 2 times daily   sildenafil (REVATIO) 20 MG tablet More than a month at Unknown time  No No   Sig: Take 2-3  Tablets before activity by mouth   tadalafil (CIALIS) 20 MG tablet More than a month at Unknown time  No No   Sig: Take 1 tablet (20 mg) by mouth daily as needed   tamsulosin (FLOMAX) 0.4 MG capsule 1/21/2019 at 2200  No Yes   Sig: Take 1 capsule (0.4 mg) by mouth daily      Facility-Administered Medications Last Administration Doses Remaining   bevacizumab (AVASTIN) intravitreal inj 1.25 mg 10/3/2018  1:15 PM 9        Allergies   Allergies   Allergen Reactions     Codeine Other (See  Comments)     Cannot take due to liver  Cannot tolerate oral narcotics       Social History   I have reviewed this patient's social history and updated it with pertinent information if needed. Frandy Workman  reports that  has never smoked. He quit smokeless tobacco use about 14 months ago. His smokeless tobacco use included chew. He reports that he does not drink alcohol or use drugs.    Family History   I have reviewed this patient's family history and updated it with pertinent information if needed.   Family History   Problem Relation Age of Onset     Prostate Cancer Maternal Grandfather      Substance Abuse Maternal Grandfather         Alcohol     Colon Cancer Father 60     Pancreatic Cancer Father 60     Prostate Cancer Father      Colorectal Cancer Father      Macular Degeneration Father      Cancer Father      Glaucoma Father      Colorectal Cancer Maternal Grandmother      Cancer Maternal Grandmother      Substance Abuse Maternal Grandmother         Alcohol     Colorectal Cancer Paternal Grandmother      Cancer Mother      Diabetes Mother          3/2016     Cerebrovascular Disease Mother         Passed away in Feb of this year, 80 years old.     Thyroid Disease Mother      Depression Mother      Asthma Sister         Had since birth     Thyroid Disease Sister      Depression Sister      Liver Disease No family hx of        Review of Systems   The 10 point Review of Systems is negative other than noted in the HPI or here.     Physical Exam   Temp: 98  F (36.7  C) Temp src: Oral BP: 123/66 Pulse: 64     SpO2: 100 %      Vital Signs with Ranges  Temp:  [98  F (36.7  C)] 98  F (36.7  C)  Pulse:  [64] 64  BP: (123)/(66) 123/66  SpO2:  [100 %] 100 %  0 lbs 0 oz    Constitutional: Pleasant male seen laying in bed. No apparent distress, and appears stated age.  Eyes: Lids and lashes normal, sclera clear, conjunctiva normal.  ENT: Normocephalic,  oral pharynx with moist mucus membranes, tonsils without  erythema or exudates, gums normal and good dentition.   Respiratory: No increased work of breathing, good air exchange, clear to auscultation bilaterally, no crackles or wheezing.  Cardiovascular: Regular rate and rhythm, normal S1 and S2, and no murmur noted.  GI: No masses or scars. +BS. Soft. No tenderness on palpation.  Skin: No bruising or bleeding, normal skin color, texture, turgor, no redness, warmth, or swelling, no rashes, no lesions, no jaundice.  Extremities: There is no redness, warmth, or swelling of the joints. No lower extremity edema. No cyanosis. Pedal pulses 2+ bilaterally. R groin incision with small bruise, no bleeding or tender on palpation.  Neurologic: Awake, alert, oriented to name, place and time.    Vascular access: PIV    Data   ROUTINE IP LABS (Last four results)  BMP  Recent Labs   Lab 01/22/19  1040      POTASSIUM 4.2   CHLORIDE 110*   DEBORAH 8.5   CO2 26   BUN 19   CR 1.06   *     CBC  Recent Labs   Lab 01/22/19  1040   WBC 3.8*   RBC 4.37*   HGB 15.9   HCT 46.9   *   MCH 36.4*   MCHC 33.9   RDW 14.4   PLT 38*     INR  Recent Labs   Lab 01/22/19  1040   INR 1.24*

## 2019-01-22 NOTE — PROGRESS NOTES
Arrived from home for a TACE.  VSS.  Denies pain.  PIV placed and labs sent.  Denies pain.  Resting in bed.  Needs consent.  H&P current.

## 2019-01-22 NOTE — PROCEDURES
Interventional Radiology Brief Post Procedure Note    Procedure: IR CHEMO EMBOLIZATION    Proceduralist: Case Mcclellan MD and Benigno Scott MD    Assistant: None    Time Out: Prior to the start of the procedure and with procedural staff participation, I verbally confirmed the patient s identity using two indicators, relevant allergies, that the procedure was appropriate and matched the consent or emergent situation, and that the correct equipment/implants were available. Immediately prior to starting the procedure I conducted the Time Out with the procedural staff and re-confirmed the patient s name, procedure, and site/side. (The Joint Commission universal protocol was followed.)  Yes    Medications   Medication Event Details Admin User Admin Time   mitoMYcin (MUTAMYCIN) 10 mg, DOXOrubicin HCl (ADRIAMYCIN) 50 mg, sterile water (preservative free) 1.5 mL in sterile contrast (VISIPAQUE) 10 mL CHEMOTHERAPY Medication Given by Other Clinician Route: Other; Scheduled Time:  2:00 PM Albee, Duane A, RN 1/22/2019  2:19 PM   HYDROmorphone (DILAUDID) PCA 1 mg/mL OPIOID NAIVE Medication New Syringe/Cartridge Route: Intravenous; Scheduled Time:  2:45 PM Albee, Duane A, RN 1/22/2019  2:33 PM   fentaNYL (PF) (SUBLIMAZE) injection 25-50 mcg Medication Given Dose: 100 mcg; Route: Intravenous Albee, Duane A, RN 1/22/2019  2:46 PM   lidocaine 1 % 1-30 mL Medication Given by Other Clinician Dose: 4 mL; Route: Intradermal Albee, Duane A, RN 1/22/2019  2:47 PM   midazolam (VERSED) injection 0.5-1 mg Medication Given Dose: 2 mg; Route: Intravenous Albee, Duane A, RN 1/22/2019  2:47 PM   iodixanol (VISIPAQUE 320) injection 150 mL Medication Given Dose: 85 mL; Route: INTRA-ARTERIAL; Scheduled Time:  1:45 PM Joanne Villarreal 1/22/2019  3:09 PM       Sedation: IR Nurse Monitored Care   Post Procedure Summary:  Prior to the start of the procedure and with procedural staff participation, I verbally confirmed the patient s identity  using two indicators, relevant allergies, that the procedure was appropriate and matched the consent or emergent situation, and that the correct equipment/implants were available. Immediately prior to starting the procedure I conducted the Time Out with the procedural staff and re-confirmed the patient s name, procedure, and site/side. (The Joint Commission universal protocol was followed.)  Yes       Sedatives: Fentanyl and Midazolam (Versed)    Vital signs, airway and pulse oximetry were monitored and remained stable throughout the procedure and sedation was maintained until the procedure was complete.  The patient was monitored by staff until sedation discharge criteria were met.    Patient tolerance: Patient tolerated the procedure well with no immediate complications.    Time of sedation in minutes: 90 Minutes minutes from beginning to end of physician one to one monitoring.          Findings: TACE performed to segment 4A and 3.  Good tumor staining.  Questionable small R groin hematoma after procedure.     Estimated Blood Loss: Minimal    Fluoroscopy Time: 26 minute(s)    SPECIMENS: None    Complications: 1. None     Condition: Stable    Plan:   -Pt to be admitted to medicine for overnight monitoring   -Return for ablation tomorrow morning  -D/w Pippa Alexandre, david assistance.    Comments: See dictated procedure note for full details.    Case Mcclellan MD

## 2019-01-23 ENCOUNTER — APPOINTMENT (OUTPATIENT)
Dept: INTERVENTIONAL RADIOLOGY/VASCULAR | Facility: CLINIC | Age: 54
End: 2019-01-23
Payer: MEDICARE

## 2019-01-23 ENCOUNTER — ANESTHESIA (OUTPATIENT)
Dept: SURGERY | Facility: CLINIC | Age: 54
End: 2019-01-23
Payer: MEDICARE

## 2019-01-23 ENCOUNTER — APPOINTMENT (OUTPATIENT)
Dept: GENERAL RADIOLOGY | Facility: CLINIC | Age: 54
End: 2019-01-23
Attending: RADIOLOGY
Payer: MEDICARE

## 2019-01-23 VITALS
DIASTOLIC BLOOD PRESSURE: 70 MMHG | HEART RATE: 72 BPM | SYSTOLIC BLOOD PRESSURE: 132 MMHG | TEMPERATURE: 97.8 F | RESPIRATION RATE: 16 BRPM | OXYGEN SATURATION: 98 %

## 2019-01-23 LAB
ALBUMIN SERPL-MCNC: 2.5 G/DL (ref 3.4–5)
ALP SERPL-CCNC: 90 U/L (ref 40–150)
ALT SERPL W P-5'-P-CCNC: 73 U/L (ref 0–70)
ANION GAP SERPL CALCULATED.3IONS-SCNC: 9 MMOL/L (ref 3–14)
AST SERPL W P-5'-P-CCNC: 108 U/L (ref 0–45)
BASOPHILS # BLD AUTO: 0 10E9/L (ref 0–0.2)
BASOPHILS NFR BLD AUTO: 0.3 %
BILIRUB SERPL-MCNC: 1.7 MG/DL (ref 0.2–1.3)
BUN SERPL-MCNC: 21 MG/DL (ref 7–30)
CALCIUM SERPL-MCNC: 7.9 MG/DL (ref 8.5–10.1)
CHLORIDE SERPL-SCNC: 112 MMOL/L (ref 94–109)
CO2 SERPL-SCNC: 22 MMOL/L (ref 20–32)
CREAT SERPL-MCNC: 1.07 MG/DL (ref 0.66–1.25)
DIFFERENTIAL METHOD BLD: ABNORMAL
EOSINOPHIL # BLD AUTO: 0 10E9/L (ref 0–0.7)
EOSINOPHIL NFR BLD AUTO: 0.5 %
ERYTHROCYTE [DISTWIDTH] IN BLOOD BY AUTOMATED COUNT: 14.6 % (ref 10–15)
GFR SERPL CREATININE-BSD FRML MDRD: 78 ML/MIN/{1.73_M2}
GLUCOSE BLDC GLUCOMTR-MCNC: 127 MG/DL (ref 70–99)
GLUCOSE BLDC GLUCOMTR-MCNC: 133 MG/DL (ref 70–99)
GLUCOSE SERPL-MCNC: 135 MG/DL (ref 70–99)
HCT VFR BLD AUTO: 38.4 % (ref 40–53)
HGB BLD-MCNC: 12.5 G/DL (ref 13.3–17.7)
IMM GRANULOCYTES # BLD: 0 10E9/L (ref 0–0.4)
IMM GRANULOCYTES NFR BLD: 0 %
LYMPHOCYTES # BLD AUTO: 0.5 10E9/L (ref 0.8–5.3)
LYMPHOCYTES NFR BLD AUTO: 12.1 %
MCH RBC QN AUTO: 35 PG (ref 26.5–33)
MCHC RBC AUTO-ENTMCNC: 32.6 G/DL (ref 31.5–36.5)
MCV RBC AUTO: 108 FL (ref 78–100)
MONOCYTES # BLD AUTO: 0.2 10E9/L (ref 0–1.3)
MONOCYTES NFR BLD AUTO: 5.5 %
NEUTROPHILS # BLD AUTO: 3.1 10E9/L (ref 1.6–8.3)
NEUTROPHILS NFR BLD AUTO: 81.6 %
NRBC # BLD AUTO: 0 10*3/UL
NRBC BLD AUTO-RTO: 0 /100
PLATELET # BLD AUTO: 29 10E9/L (ref 150–450)
POTASSIUM SERPL-SCNC: 4.4 MMOL/L (ref 3.4–5.3)
PROT SERPL-MCNC: 6.2 G/DL (ref 6.8–8.8)
RBC # BLD AUTO: 3.57 10E12/L (ref 4.4–5.9)
SODIUM SERPL-SCNC: 143 MMOL/L (ref 133–144)
WBC # BLD AUTO: 3.8 10E9/L (ref 4–11)

## 2019-01-23 PROCEDURE — G0378 HOSPITAL OBSERVATION PER HR: HCPCS

## 2019-01-23 PROCEDURE — 25000132 ZZH RX MED GY IP 250 OP 250 PS 637: Mod: GY | Performed by: PHYSICIAN ASSISTANT

## 2019-01-23 PROCEDURE — 25000128 H RX IP 250 OP 636: Performed by: NURSE ANESTHETIST, CERTIFIED REGISTERED

## 2019-01-23 PROCEDURE — 25000125 ZZHC RX 250: Performed by: NURSE ANESTHETIST, CERTIFIED REGISTERED

## 2019-01-23 PROCEDURE — 80053 COMPREHEN METABOLIC PANEL: CPT | Performed by: PHYSICIAN ASSISTANT

## 2019-01-23 PROCEDURE — 37000009 ZZH ANESTHESIA TECHNICAL FEE, EACH ADDTL 15 MIN

## 2019-01-23 PROCEDURE — 71000015 ZZH RECOVERY PHASE 1 LEVEL 2 EA ADDTL HR

## 2019-01-23 PROCEDURE — 71000014 ZZH RECOVERY PHASE 1 LEVEL 2 FIRST HR

## 2019-01-23 PROCEDURE — 37000008 ZZH ANESTHESIA TECHNICAL FEE, 1ST 30 MIN

## 2019-01-23 PROCEDURE — 00000146 ZZHCL STATISTIC GLUCOSE BY METER IP

## 2019-01-23 PROCEDURE — A9270 NON-COVERED ITEM OR SERVICE: HCPCS | Mod: GY | Performed by: PHYSICIAN ASSISTANT

## 2019-01-23 PROCEDURE — 25000125 ZZHC RX 250: Performed by: STUDENT IN AN ORGANIZED HEALTH CARE EDUCATION/TRAINING PROGRAM

## 2019-01-23 PROCEDURE — 99217 ZZC OBSERVATION CARE DISCHARGE: CPT | Performed by: INTERNAL MEDICINE

## 2019-01-23 PROCEDURE — 25000565 ZZH ISOFLURANE, EA 15 MIN

## 2019-01-23 PROCEDURE — 25000128 H RX IP 250 OP 636: Performed by: STUDENT IN AN ORGANIZED HEALTH CARE EDUCATION/TRAINING PROGRAM

## 2019-01-23 PROCEDURE — 36415 COLL VENOUS BLD VENIPUNCTURE: CPT | Performed by: PHYSICIAN ASSISTANT

## 2019-01-23 PROCEDURE — 27210732 ZZH ACCESSORY CR1

## 2019-01-23 PROCEDURE — 40000170 ZZH STATISTIC PRE-PROCEDURE ASSESSMENT II

## 2019-01-23 PROCEDURE — 27210908 ZZH NEEDLE CR4

## 2019-01-23 PROCEDURE — 27210903 ZZH KIT CR5

## 2019-01-23 PROCEDURE — 77013 CT GUIDE FOR TISSUE ABLATION: CPT

## 2019-01-23 PROCEDURE — 85025 COMPLETE CBC W/AUTO DIFF WBC: CPT | Performed by: PHYSICIAN ASSISTANT

## 2019-01-23 PROCEDURE — 71045 X-RAY EXAM CHEST 1 VIEW: CPT

## 2019-01-23 RX ORDER — CEFAZOLIN SODIUM 1 G/3ML
INJECTION, POWDER, FOR SOLUTION INTRAMUSCULAR; INTRAVENOUS PRN
Status: DISCONTINUED | OUTPATIENT
Start: 2019-01-23 | End: 2019-01-23

## 2019-01-23 RX ORDER — ONDANSETRON 4 MG/1
4 TABLET, ORALLY DISINTEGRATING ORAL EVERY 30 MIN PRN
Status: DISCONTINUED | OUTPATIENT
Start: 2019-01-23 | End: 2019-01-23 | Stop reason: HOSPADM

## 2019-01-23 RX ORDER — ONDANSETRON 2 MG/ML
INJECTION INTRAMUSCULAR; INTRAVENOUS PRN
Status: DISCONTINUED | OUTPATIENT
Start: 2019-01-23 | End: 2019-01-23

## 2019-01-23 RX ORDER — SODIUM CHLORIDE 9 MG/ML
INJECTION, SOLUTION INTRAVENOUS CONTINUOUS
Status: CANCELLED | OUTPATIENT
Start: 2019-01-23

## 2019-01-23 RX ORDER — PROPOFOL 10 MG/ML
INJECTION, EMULSION INTRAVENOUS PRN
Status: DISCONTINUED | OUTPATIENT
Start: 2019-01-23 | End: 2019-01-23

## 2019-01-23 RX ORDER — HYDROMORPHONE HCL/0.9% NACL/PF 0.2MG/0.2
0.2 SYRINGE (ML) INTRAVENOUS
Status: DISCONTINUED | OUTPATIENT
Start: 2019-01-23 | End: 2019-01-23

## 2019-01-23 RX ORDER — SODIUM CHLORIDE, SODIUM LACTATE, POTASSIUM CHLORIDE, CALCIUM CHLORIDE 600; 310; 30; 20 MG/100ML; MG/100ML; MG/100ML; MG/100ML
INJECTION, SOLUTION INTRAVENOUS CONTINUOUS
Status: DISCONTINUED | OUTPATIENT
Start: 2019-01-23 | End: 2019-01-23 | Stop reason: HOSPADM

## 2019-01-23 RX ORDER — EPHEDRINE SULFATE 50 MG/ML
INJECTION, SOLUTION INTRAMUSCULAR; INTRAVENOUS; SUBCUTANEOUS PRN
Status: DISCONTINUED | OUTPATIENT
Start: 2019-01-23 | End: 2019-01-23

## 2019-01-23 RX ORDER — LIDOCAINE 40 MG/G
CREAM TOPICAL
Status: CANCELLED | OUTPATIENT
Start: 2019-01-23

## 2019-01-23 RX ORDER — FENTANYL CITRATE 50 UG/ML
25-50 INJECTION, SOLUTION INTRAMUSCULAR; INTRAVENOUS
Status: DISCONTINUED | OUTPATIENT
Start: 2019-01-23 | End: 2019-01-23 | Stop reason: HOSPADM

## 2019-01-23 RX ORDER — DEXTROSE MONOHYDRATE 25 G/50ML
25-50 INJECTION, SOLUTION INTRAVENOUS
Status: CANCELLED | OUTPATIENT
Start: 2019-01-23

## 2019-01-23 RX ORDER — LABETALOL HYDROCHLORIDE 5 MG/ML
10 INJECTION, SOLUTION INTRAVENOUS
Status: DISCONTINUED | OUTPATIENT
Start: 2019-01-23 | End: 2019-01-23 | Stop reason: HOSPADM

## 2019-01-23 RX ORDER — FENTANYL CITRATE 50 UG/ML
INJECTION, SOLUTION INTRAMUSCULAR; INTRAVENOUS PRN
Status: DISCONTINUED | OUTPATIENT
Start: 2019-01-23 | End: 2019-01-23

## 2019-01-23 RX ORDER — ONDANSETRON 2 MG/ML
4 INJECTION INTRAMUSCULAR; INTRAVENOUS ONCE
Status: CANCELLED | OUTPATIENT
Start: 2019-01-23 | End: 2019-01-23

## 2019-01-23 RX ORDER — NICOTINE POLACRILEX 4 MG
15-30 LOZENGE BUCCAL
Status: CANCELLED | OUTPATIENT
Start: 2019-01-23

## 2019-01-23 RX ORDER — HYDROMORPHONE HYDROCHLORIDE 1 MG/ML
.3-.5 INJECTION, SOLUTION INTRAMUSCULAR; INTRAVENOUS; SUBCUTANEOUS EVERY 5 MIN PRN
Status: DISCONTINUED | OUTPATIENT
Start: 2019-01-23 | End: 2019-01-23 | Stop reason: HOSPADM

## 2019-01-23 RX ORDER — CEFAZOLIN SODIUM 2 G/100ML
2 INJECTION, SOLUTION INTRAVENOUS
Status: CANCELLED | OUTPATIENT
Start: 2019-01-23

## 2019-01-23 RX ORDER — SODIUM CHLORIDE, SODIUM LACTATE, POTASSIUM CHLORIDE, CALCIUM CHLORIDE 600; 310; 30; 20 MG/100ML; MG/100ML; MG/100ML; MG/100ML
INJECTION, SOLUTION INTRAVENOUS CONTINUOUS PRN
Status: DISCONTINUED | OUTPATIENT
Start: 2019-01-23 | End: 2019-01-23

## 2019-01-23 RX ORDER — ONDANSETRON 2 MG/ML
4 INJECTION INTRAMUSCULAR; INTRAVENOUS EVERY 30 MIN PRN
Status: DISCONTINUED | OUTPATIENT
Start: 2019-01-23 | End: 2019-01-23 | Stop reason: HOSPADM

## 2019-01-23 RX ORDER — NALOXONE HYDROCHLORIDE 0.4 MG/ML
.1-.4 INJECTION, SOLUTION INTRAMUSCULAR; INTRAVENOUS; SUBCUTANEOUS
Status: CANCELLED | OUTPATIENT
Start: 2019-01-23 | End: 2019-01-24

## 2019-01-23 RX ORDER — OXYCODONE HYDROCHLORIDE 5 MG/1
5 TABLET ORAL EVERY 6 HOURS PRN
Qty: 12 TABLET | Refills: 0 | Status: ON HOLD | OUTPATIENT
Start: 2019-01-23 | End: 2019-01-26

## 2019-01-23 RX ORDER — LIDOCAINE HYDROCHLORIDE 20 MG/ML
INJECTION, SOLUTION INFILTRATION; PERINEURAL PRN
Status: DISCONTINUED | OUTPATIENT
Start: 2019-01-23 | End: 2019-01-23

## 2019-01-23 RX ADMIN — PROPOFOL 140 MG: 10 INJECTION, EMULSION INTRAVENOUS at 08:11

## 2019-01-23 RX ADMIN — ONDANSETRON 4 MG: 2 INJECTION INTRAMUSCULAR; INTRAVENOUS at 10:39

## 2019-01-23 RX ADMIN — Medication 5 MG: at 08:38

## 2019-01-23 RX ADMIN — PROPOFOL 30 MG: 10 INJECTION, EMULSION INTRAVENOUS at 10:26

## 2019-01-23 RX ADMIN — Medication 5 MG: at 08:25

## 2019-01-23 RX ADMIN — Medication 0.3 MG: at 11:47

## 2019-01-23 RX ADMIN — ROCURONIUM BROMIDE 50 MG: 10 INJECTION INTRAVENOUS at 08:11

## 2019-01-23 RX ADMIN — FENTANYL CITRATE 25 MCG: 50 INJECTION INTRAMUSCULAR; INTRAVENOUS at 11:17

## 2019-01-23 RX ADMIN — LIDOCAINE HYDROCHLORIDE 80 MG: 20 INJECTION, SOLUTION INFILTRATION; PERINEURAL at 08:11

## 2019-01-23 RX ADMIN — LACTULOSE 30 G: 20 SOLUTION ORAL at 16:37

## 2019-01-23 RX ADMIN — PHENYLEPHRINE HYDROCHLORIDE 100 MCG: 10 INJECTION, SOLUTION INTRAMUSCULAR; INTRAVENOUS; SUBCUTANEOUS at 08:48

## 2019-01-23 RX ADMIN — SUGAMMADEX 200 MG: 100 INJECTION, SOLUTION INTRAVENOUS at 10:35

## 2019-01-23 RX ADMIN — PHENYLEPHRINE HYDROCHLORIDE 100 MCG: 10 INJECTION, SOLUTION INTRAMUSCULAR; INTRAVENOUS; SUBCUTANEOUS at 08:25

## 2019-01-23 RX ADMIN — DEXTRAN 70, AND HYPROMELLOSE 2910 1 DROP: 1; 3 SOLUTION/ DROPS OPHTHALMIC at 16:36

## 2019-01-23 RX ADMIN — CEFAZOLIN 2 G: 1 INJECTION, POWDER, FOR SOLUTION INTRAMUSCULAR; INTRAVENOUS at 08:48

## 2019-01-23 RX ADMIN — PHENYLEPHRINE HYDROCHLORIDE 100 MCG: 10 INJECTION, SOLUTION INTRAMUSCULAR; INTRAVENOUS; SUBCUTANEOUS at 09:04

## 2019-01-23 RX ADMIN — PHENYLEPHRINE HYDROCHLORIDE 100 MCG: 10 INJECTION, SOLUTION INTRAMUSCULAR; INTRAVENOUS; SUBCUTANEOUS at 08:38

## 2019-01-23 RX ADMIN — PHENYLEPHRINE HYDROCHLORIDE 100 MCG: 10 INJECTION, SOLUTION INTRAMUSCULAR; INTRAVENOUS; SUBCUTANEOUS at 08:30

## 2019-01-23 RX ADMIN — PHENYLEPHRINE HYDROCHLORIDE 100 MCG: 10 INJECTION, SOLUTION INTRAMUSCULAR; INTRAVENOUS; SUBCUTANEOUS at 09:20

## 2019-01-23 RX ADMIN — MIDAZOLAM 1 MG: 1 INJECTION INTRAMUSCULAR; INTRAVENOUS at 08:05

## 2019-01-23 RX ADMIN — SODIUM CHLORIDE, POTASSIUM CHLORIDE, SODIUM LACTATE AND CALCIUM CHLORIDE: 600; 310; 30; 20 INJECTION, SOLUTION INTRAVENOUS at 07:56

## 2019-01-23 RX ADMIN — DEXTRAN 70, AND HYPROMELLOSE 2910 1 DROP: 1; 3 SOLUTION/ DROPS OPHTHALMIC at 14:41

## 2019-01-23 RX ADMIN — PROPOFOL 30 MG: 10 INJECTION, EMULSION INTRAVENOUS at 10:24

## 2019-01-23 RX ADMIN — FENTANYL CITRATE 100 MCG: 50 INJECTION, SOLUTION INTRAMUSCULAR; INTRAVENOUS at 08:11

## 2019-01-23 RX ADMIN — Medication 10 MG: at 08:55

## 2019-01-23 NOTE — DISCHARGE SUMMARY
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 Also with ablation on 1/23/19.    Please see H&P for further detail of history.    Hospital Course   Frandy Workman was admitted on 1/22/2019.  The following problems were addressed during his hospitalization:     #HCC   #Small right pneumothorax  Follows with Dr. Lees and Dr. Scott. He underwent routine screening ultrasound on 12/10/18, which showed a non-specific left hepatic lobe lesion. MRI abdomen on 12/23/19 showed 2 lesions, measuring 3.1 cm and 1.1 cm in hepatic segments 4b and 3, that were LIRADS 5. 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 CT chest and bone scan without metastatic disease. He was referred to IR for liver-directed therapy and started transplant evaluation. Established care with Dr. Lees on 1/14/19. Presented for observation s/p TACE on 1/22/19. Did well post-op with minimal use of Dilaudid PCA (0.2 mg total), no nausea and no evidence of confusion. Patient subsequently underwent a CT liver ablation on 1/23, which was complicated by a small right pneumothorax after hydrodissection. Post-procedure CXR showed stable pneumo (discussed with radiology), which is too small to be amenable to chest tube. Patient without any evidence of respiratory distress, thus ok to discharge with close f/u.  - Advised patient to present to the ER if experiencing increasing SOB, sharp chest pain or pain with inspiration.  - Requested CXR and PCP visit  in 2 days to assure pneumothorax is stable/improving   - Will follow-up with IR in one month with pre-clinic MRI  - Discharged to small supply of Oxycodone via IR MD     #Cirrhosis 2/2 ESTRADA  Per pt, he was on the transplant list at [facility] when he lived in [place].  He follows with Dr. Banuelos here in the hepatology clinic now.    - Continue PTA Lactulose and Rifaximin  - Has pre-transplant work-up on 2/4 and 2/5     #DMII  - Continue PTA Farxiga and Tresiba     #HTN  - Continue Carvedilol 12.5 mg BID     #BPH  - Continue Flomax     #OPHTHO  - Continue PTA eye drops    Dispo: Discharged to home on 1/23  Follow-up: CXR and PCP visit requested for 1/25. IR to arrange MRI and clinic follow-up in 1 month. Has appts for liver transplant work-up on 2/4 and 2/5.    Above plan discussed with staff physician, Dr. Juan Daniel Alexandre PA-C  Hematology/Oncology  Pager: [phone]    Significant Results and Procedures   Results for orders placed or performed during the hospital encounter of 01/22/19   IR Chemo Embolization    Narrative    Procedures 1/22/2019:  1. Ultrasound guidance for right common femoral arterial access.  2. Catheterization and angiography of the common hepatic and left  hepatic arteries.  3. Catheterization subselection of 2 separate greater than third order  branches of the left hepatic artery.  4. Bailee CT angiography through each of the 2 subselective branches of  the left hepatic artery.  5. Transarterial chemoembolization to each of the 2 subselective  branches of the left hepatic artery.  6. Minx  device closure of the right common femoral arteriotomy.    History: 53-year-old male the history of unresectable hepatocellular  carcinoma. Patient was initially scheduled to receive a TACE procedure  today to the dominant segment 4A lesion, followed by ablation of the  for a lesion and a smaller segment 3 lesion on the following day.    Comparison: MRI 12/23/2018.    Staff: KUMAR Scott  M.D.    Fellow: TRACE Mcclellan MD    Medications:   1. 100 mcg Fentanyl  2. 2 mg Versed  3. Chemoembolization mixture of 2 vials of lipiodol mixed with a  solution containing 10 mg of mitomycin and 50 mg of doxorubicin.  4. Approximately 2 cc of a premixed vial of 100-300 um embospheres.    Moderate sedation administered by the IR nurse at the supervision of  the attending. Vital signs and oxygenation continuously monitored. The  patient remained stable throughout the procedure.    Sedation time: 90 minutes of direct face-to-face evaluation    Fluoroscopy time: 26 minutes    Findings/procedure:    Prior to the procedure, both verbal and written informed consent  obtained from the patient. Patient brought to the procedural suite and  placed supine on the fluoroscopy table. The bilateral groins were  prepped and draped in standard sterile fashion. Preprocedural timeout  was performed. The mid right femoral head was marked for metallic  clamp under fluoroscopy. A limited ultrasound of the right common  femoral artery demonstrated a patent vessel. 1% lidocaine was  instilled overlying soft tissues. Under direct ultrasound guidance, a  micropuncture needle was advanced into the common femoral artery. An  image of the needle within the vessel was saved. Needle was upsized  for a 5 [place] short vascular sheath.    Through the sheath, C2 glide catheter was advanced into the upper  abdominal aorta and used to select the celiac artery. 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 A  Glidewire was used to select the common hepatic artery. Catheter was  advanced and common hepatic angiography is performed. The angiography  well demonstrated the tumor blush in hepatic segment 4A.    Using a progreat microcatheter and guidewire, the major feeding vessel  to the tumor was selected. A Bailee CT was performed, demonstrating  adequate  coverage of the tumor. Transarterial chemoembolization was  then performed with the prepared chemoembolization mixture.  Intermittent fluoroscopy was used to confirm adequate tumor staining  and exclude significant reflux. After the embolization was carried out  to near stasis, small amount of embospheres were injected, again  confirming stasis.    The progreat catheter was then removed and disposed of. A second  progreat catheter and wire were then used to select the branch going  to hepatic segment 3. Angiography demonstrated a blush just medial to  the previously treated tumor. Again, Bailee CT was performed this  demonstrated parenchymal blush in the medial left lobe of the liver  which was adjacent to the focal area of enhancement seen on MRI.  Decision was made to proceed with staining of this region with  lipiodol. A small amount of lipiodol was injected to stasis. This  resulted in staining in the segment 3.    The catheters and wires were then removed. A limited right femoral  angiogram demonstrated satisfactory position of the arteriotomy for  device closure. A mynx  device was then deployed in standard  fashion.    Patient had some questionable groin swelling at the right groin which  necessitated longer than typical time of manual pressure. Otherwise  there were no immediate complications.      Impression    Impression:  1. Angiographically successful transarterial chemoembolization to the  dominant segment 4A tumor.  2. Lipiodol staining of the medial segment 3 was also performed in  attempt to help localize the segment 3 tumor for the prior day's  ablation.    Plan:   Patient will be admitted for overnight monitoring per the standard  post TACE protocol. We will proceed with ablation tomorrow morning.    I, BASSAM TILLEY MD, attest that I was present in the procedure room  for the entire procedure.    I have personally reviewed the examination and initial interpretation  and I agree with the  findings.    BASSAM SCOTT MD   CT Liver Ablation (RF)    Narrative    PROCEDURES 1/23/2019:  1. CT and ultrasound guided produces microwave ablation to 2 separate  hepatic lesions.     Clinical History: History of hepatocellular carcinoma. Patient had a  3.2 cm unresectable hepatocellular carcinoma in segment 4A which was  treated with TACE the prior day. Plan for ablation to that lesion and  a smaller lesion in segment 3 was developed.    Comparisons: MRI 12/23/2018.    Staff Radiologist: KUMAR Scott MD    Fellow: TRACE Mcclellan MD    Medications: General endotracheal anesthesia was induced by the  anesthesia staff. 5 cc 1% lidocaine was used for local anesthesia.    Total DLP: 2956 mGy*cm.    PROCEDURE:   The patient understood the limitations, alternatives, and risks of the  procedure and requested the procedure be performed. Both written and  oral consent were obtained.    Patient was brought to the CT suite and placed on the CT table.  General endotracheal anesthesia was induced by the anesthesia staff. A  preprocedural timeout was performed. Preprocedural scan was performed,  which demonstrated the larger stained lesion in segment 4A well,  although the smaller lesion in segment 3 was difficult to visualize.  Limited ultrasounds were performed, the lesion was able to be seen  with more certainty. Under direct ultrasound guidance, a 21-gauge  Chiba needle was advanced adjacent to the lesion. A diagnostic CT was  performed, which demonstrated the tip of the Chiba to be adjacent to  the expected location of the lesion. Small skin nick was made with 11  blade scalpel and the 13-gauge AddFleetrint microwave ablation probe was  advanced into the lesion. Diagnostic CT scan confirmed satisfactory  positioning. Chiba needle was removed. Microwave ablation was then  carried out for 7 minutes at 100 W. The ablation probe was removed.    Attention was then turned to the segment 4A lesion. Skin entry site  was marked with a  metallic clamp under CT fluoroscopy. Under  intermittent CT fluoroscopy, the 13-gauge Emprint microwave ablation  probe was advanced into the lesion. Probe had to be repositioned  numerous times necessitating multiple short diagnostic CT scans.  Eventually satisfactory position was confirmed. Under CT guidance a 5  [employer] centesis catheter was advanced into the perihepatic space.  Room air was injected through the needle in an attempt to hydrodissect  the liver away from the diaphragm. After injection of approximately  200 cc of air, satisfactory hydrodissection was confirmed. Follow-up  diagnostic CT scan demonstrated the probe to be in satisfactory  position. A small right pneumothorax was noted. Microwave ablation was  then performed for 10 minutes at 100 W. The probe was removed. Skin  was cleansed and a sterile dressing was placed over each needle entry  site.    Care of the patient was then handed over the anesthesia staff.       Impression    IMPRESSION:   1. Technically successful CT and ultrasound-guided microwave ablation  of the segment 4A and segment 3 hepatocellular carcinomas.  2. Procedure was complicated by a small right-sided pneumothorax which  occurred during pneumodissection.     PLAN:  We will plan to follow the patient with a chest x-ray to ensure the  pneumothorax is not enlarging. He will likely be discharged today,  assuming his pain is controlled and he returns to baseline functional  status.    I, BASSAM TILLEY MD, attest that I was present in the procedure room  for the entire procedure.    I have personally reviewed the examination and initial interpretation  and I agree with the findings.    BASSAM TILLEY MD     Pending Results   None    Code Status   Full Code    Primary Care Physician   Natalie Russell    Physical Exam   Temp: 97.8  F (36.6  C) Temp src: Oral BP: 132/70 Pulse: 72 Heart Rate: 68 Resp: 16 SpO2: 98 % O2 Device: Nasal cannula Oxygen Delivery: 1 LPM  There  were no vitals filed for this visit.  Vital Signs with Ranges  Temp:  [97.4  F (36.3  C)-98.1  F (36.7  C)] 97.8  F (36.6  C)  Pulse:  [71-72] 72  Heart Rate:  [68-80] 68  Resp:  [13-16] 16  BP: (107-145)/(63-82) 132/70  SpO2:  [94 %-100 %] 98 %  I/O last 3 completed shifts:  In: 1180 [P.O.:200; I.V.:980]  Out: -     Time Spent on this Encounter   I, Pippa BRYANTAndrew Alexandre, personally saw the patient today and spent greater than 30 minutes discharging this patient.    Discharge Disposition   Discharged to home  Condition at discharge: Stable    Consultations This Hospital Stay   None    Discharge Orders      XR Chest 2 Views     Reason for your hospital stay    You were admitted for observation after a TACE. You tolerated this well. Your labs/vitals are stable and you are safe to discharge home.     Adult [facility] Follow-up and recommended labs and tests    -- The Interventional Radiology team will schedule a follow-up MRI and appointment.  -- You have a liver transplant work-up scheduled for 2/4/19.    Appointments on [place] and/or [facility] (with [facility] or [facility] provider or service). Call [phone] if you haven't heard regarding these appointments within 7 days of discharge.    See appointments below:     Activity    Your activity upon discharge: Activity as tolerated.     When to contact your care team    Call the [facility] 24-hour triage line at [phone] or [phone] for temp >100.4, uncontrolled nausea/vomiting/diarrhea/constipation, unrelieved pain, bleeding not relieved with pressure, dizziness, chest pain, shortness of breath, loss of consciousness, and any new or concerning symptoms.     Call [phone] and ask for the care coordinator that works with your oncologist if you have questions about scans, appointments, hospital follow-up, or other concerns.     Follow Up and recommended labs and tests    Follow up with primary care provider, Natalie Russell, within 2 days to  evaluate small right pneumothorax after surgery.  The following labs/tests are recommended: Chest XR (ordered).     Discharge Instructions    - During your ablation, a small area on your right lung collapsed. A chest x-ray shows this is stable and does not need further intervention. I have requested a follow-up chest x-ray and visit with your PCP (Dr. Bowen) in 2 days to make sure it is still stable to improving.  - IF you develop increasing shortness of breath, sharp chest pain or pain with inspiration, you need to present to the Emergency Room for further evaluation.     Diet    Follow this diet upon discharge: Regular diet as tolerated.     Discharge Medications   Current Discharge Medication List      START taking these medications    Details   oxyCODONE (ROXICODONE) 5 MG tablet Take 1 tablet (5 mg) by mouth every 6 hours as needed for pain  Qty: 12 tablet, Refills: 0    Associated Diagnoses: HCC (hepatocellular carcinoma) (H)         CONTINUE these medications which have NOT CHANGED    Details   !! ACCU-CHEK EDINSON PLUS test strip USE TO TEST BLOOD SUGARS FOUR TIMES DAILY OR AS DIRECTED  Qty: 150 strip, Refills: 11    Associated Diagnoses: Type II diabetes mellitus (H)      Artificial Tear Ointment (REFRESH LACRI-LUBE) OINT Apply 1 Application to eye At Bedtime  Qty: 1 Tube, Refills: 3    Associated Diagnoses: Post-operative state      Artificial Tear Solution (SM ARTIFICIAL TEARS) SOLN Place 1 drop into the right eye every hour as needed Apply at least 4 times daily and as needed for dry eye  Qty: 1 Bottle, Refills: 3    Associated Diagnoses: Dry eyes      aspirin (ASPIRIN LOW DOSE) 81 MG EC tablet Take 1 tablet (81 mg) by mouth daily  Qty: 90 tablet, Refills: 2    Associated Diagnoses: Type 2 diabetes mellitus without complication, with long-term current use of insulin (H)      BD VIKTORIA U/F 32G X 4 MM insulin pen needle Use 5 per day  Qty: 300 each, Refills: 3    Associated Diagnoses: Type 2 diabetes mellitus  without complication, with long-term current use of insulin (H)      !! blood glucose monitoring (ACCU-CHEK EDINSON PLUS) test strip USE TO TEST BLOOD SUGARS FOUR TIMES DAILY OR AS DIRECTED  Qty: 400 strip, Refills: 3    Associated Diagnoses: Type II diabetes mellitus (H)      !! blood glucose monitoring (ACCU-CHEK EDINSON PLUS) test strip USE TO TEST BLOOD SUGARS FOUR TIMES DAILY OR AS DIRECTED  Qty: 300 strip, Refills: 3    Comments: **Patient requests 90 days supply**  Associated Diagnoses: Type II diabetes mellitus (H)      !! blood glucose monitoring (ACCU-CHEK EDINSON PLUS) test strip Use to test blood sugar 4 times daily  Qty: 400 each, Refills: 3    Associated Diagnoses: Type II diabetes mellitus (H)      blood glucose monitoring (ACCU-CHEK FASTCLIX) lancets Use to test blood sugar 4 times daily or as directed.  1 box = 102 lancets  Qty: 408 each, Refills: 3    Associated Diagnoses: Type II diabetes mellitus (H)      capsaicin (ZOSTRIX) 0.025 % CREA cream To feet 2-3 times daily as needed.  Qty: 60 g, Refills: 6    Associated Diagnoses: Type II or unspecified type diabetes mellitus with neurological manifestations, not stated as uncontrolled(250.60) (H)      carvedilol (COREG) 12.5 MG tablet TAKE 1 TABLET(12.5 MG) BY MOUTH TWICE DAILY WITH MEALS  Qty: 180 tablet, Refills: 3    Associated Diagnoses: Liver cirrhosis secondary to ESTRADA (H); Secondary esophageal varices without bleeding (H)      Cholecalciferol (VITAMIN D-3 PO) Take 2,000 Units by mouth every morning       cyanocobalamin (RA VITAMIN B-12 TR) 1000 MCG TBCR Take 1,000 mcg by mouth every morning       dapagliflozin (FARXIGA) 10 MG TABS tablet Take 1 tablet (10 mg) by mouth daily  Qty: 90 tablet, Refills: 3    Associated Diagnoses: Type 2 diabetes mellitus with hyperglycemia, with long-term current use of insulin (H)      econazole nitrate 1 % external cream APPLY TOPICALLY DAILY TO FEET AND TOENAILS  Qty: 85 g, Refills: 0    Associated Diagnoses: Tinea  pedis of both feet      erythromycin (ROMYCIN) ophthalmic ointment Place into the right eye 3 times daily  Qty: 1 Tube, Refills: 1    Associated Diagnoses: Post-operative state      insulin degludec (TRESIBA) 200 UNIT/ML pen Take 100 units daily.  Qty: 100 mL, Refills: 3    Associated Diagnoses: Type 2 diabetes mellitus with hyperglycemia, with long-term current use of insulin (H)      IRON PO Take by mouth daily      lactulose (CHRONULAC) 10 GM/15ML solution Take 45 mLs (30 g) by mouth 4 times daily  Qty: 5000 mL, Refills: 11    Associated Diagnoses: Liver cirrhosis secondary to ESTRADA (H)      Multiple Vitamin (THERAVITE PO) Take 1 tablet by mouth every morning       NOVOLOG FLEXPEN 100 UNIT/ML soln INJECT 1 UNIT PER 4 GRAMS OF CARBS AT MEALS AND SNACKS. CORRECTION SCALE OF 1 UNITS PER 25 OVER [address] A[address] DOSE 75 UNITERS PER DAY  Qty: 30 mL, Refills: 3    Associated Diagnoses: Type II diabetes mellitus (H)      OLANZapine (ZYPREXA) 2.5 MG tablet Take 1 tablet (2.5 mg) by mouth At Bedtime  Qty: 90 tablet, Refills: 1    Associated Diagnoses: Insomnia, unspecified type      omeprazole (PRILOSEC) 40 MG capsule Take 1 capsule (40 mg) by mouth daily  Qty: 90 capsule, Refills: 2    Associated Diagnoses: Gastroesophageal reflux disease, esophagitis presence not specified      potassium chloride SA (K-DUR/KLOR-CON M) 20 MEQ CR tablet Take 1 tablet (20 mEq) by mouth daily  Qty: 90 tablet, Refills: 2    Associated Diagnoses: Hypokalemia      pravastatin (PRAVACHOL) 20 MG tablet Take 1 tablet (20 mg) by mouth daily  Qty: 90 tablet, Refills: 3    Comments: Same dose- dispense as 20 mg tabs.  Associated Diagnoses: Hyperlipidemia LDL goal <100      Propylene Glycol-Glycerin (ARTIFICIAL TEARS) 1-0.3 % SOLN Apply 1 drop to eye every 2 hours (while awake)  Qty: 30 mL, Refills: 11    Associated Diagnoses: Post-operative state      !! rifaximin (XIFAXAN) 550 MG TABS tablet Take 1 tablet (550 mg) by mouth 2 times daily  Qty: 60 tablet,  Refills: 11    Associated Diagnoses: Hepatic encephalopathy (H)      tamsulosin (FLOMAX) 0.4 MG capsule Take 1 capsule (0.4 mg) by mouth daily  Qty: 90 capsule, Refills: 3    Associated Diagnoses: Benign prostatic hyperplasia with lower urinary tract symptoms      !! XIFAXAN 550 MG TABS tablet TAKE 1 TABLET(550 MG) BY MOUTH TWICE DAILY  Qty: 60 tablet, Refills: 3    Associated Diagnoses: Hepatic encephalopathy (H)      sildenafil (REVATIO) 20 MG tablet Take 2-3  Tablets before activity by mouth  Qty: 90 tablet, Refills: 3    Comments: CASH  Associated Diagnoses: ED (erectile dysfunction)      tadalafil (CIALIS) 20 MG tablet Take 1 tablet (20 mg) by mouth daily as needed  Qty: 30 tablet, Refills: 0    Associated Diagnoses: Erectile dysfunction, unspecified erectile dysfunction type      UNABLE TO FIND 2 times daily ULTRA MALE ENHANCEMENT POTENCY       !! - Potential duplicate medications found. Please discuss with provider.        Allergies   Allergies   Allergen Reactions     Codeine Other (See Comments)     Cannot take due to liver  Cannot tolerate oral narcotics     Data   ROUTINE IP LABS (Last four results)  BMP  Recent Labs   Lab 01/23/19  0557 01/22/19  1040    143   POTASSIUM 4.4 4.2   CHLORIDE 112* 110*   DEBORAH 7.9* 8.5   CO2 22 26   BUN 21 19   CR 1.07 1.06   * 154*     CBC  Recent Labs   Lab 01/23/19  0557 01/22/19  1040   WBC 3.8* 3.8*   RBC 3.57* 4.37*   HGB 12.5* 15.9   HCT 38.4* 46.9   * 107*   MCH 35.0* 36.4*   MCHC 32.6 33.9   RDW 14.6 14.4   PLT 29* 38*     INR  Recent Labs   Lab 01/22/19  1040   INR 1.24*

## 2019-01-23 NOTE — OR NURSING
This RN spoke with the fellow from IR, just now, and he said he would talk to someone in IR about putting pre-op orders in. [facility]

## 2019-01-23 NOTE — PLAN OF CARE
1.The patient will be able to transition over to oral analgesics for pain control: NO: pt still on PCA, to discontinue at 0400  2. The patient will tolerate a regular diet: YES  3. The patient will be able to ambulate in the hallway without assistance OR reach the pre-procedure activity level: YES  4. The patient will be able urinate without difficulties: YES  5. Neurological status should be at the patient's baseline on admission: YES  6. Other:

## 2019-01-23 NOTE — ANESTHESIA CARE TRANSFER NOTE
Patient: Frandy Workman    Procedure(s):  Anesthesia Coverage CT Guided Liver Ablation @0800 In IR/CT Rm 2    Diagnosis: Hepatocellular Carcinoma  Diagnosis Additional Information: No value filed.    Anesthesia Type:   No value filed.     Note:  Airway :Nasal Cannula  Patient transferred to:PACU  Comments: Patient resting comfortably, breathing spontaneously.  Poal Quick  Handoff Report: Identifed the Patient, Identified the Reponsible Provider, Reviewed the pertinent medical history, Discussed the surgical course, Reviewed Intra-OP anesthesia mangement and issues during anesthesia, Set expectations for post-procedure period and Allowed opportunity for questions and acknowledgement of understanding      Vitals: (Last set prior to Anesthesia Care Transfer)    CRNA VITALS  1/23/2019 1023 - 1/23/2019 1123      1/23/2019             NIBP:  109/52    Pulse:  72    NIBP Mean:  71    SpO2:  96 %                Electronically Signed By: Pola Quick MD  January 23, 2019  11:26 AM

## 2019-01-23 NOTE — PLAN OF CARE
1.The patient will be able to transition over to oral analgesics for pain control: NPO for procedure today. Minimal IV pain meds admin  2. The patient will tolerate a regular diet: tolerated until NPO for ablation  3. The patient will be able to ambulate in the hallway without assistance OR reach the pre-procedure activity level: Yes  4. The patient will be able urinate without difficulties: Yes  5. Neurological status should be at the patient's baseline on admission: Yes

## 2019-01-23 NOTE — PROCEDURES
Interventional Radiology Brief Post Procedure Note    Procedure: CT LIVER ABLATION (RF)    Proceduralist: Case Mcclellan MD and Benigno Scott MD    Assistant: None    Time Out: Prior to the start of the procedure and with procedural staff participation, I verbally confirmed the patient s identity using two indicators, relevant allergies, that the procedure was appropriate and matched the consent or emergent situation, and that the correct equipment/implants were available. Immediately prior to starting the procedure I conducted the Time Out with the procedural staff and re-confirmed the patient s name, procedure, and site/side. (The Joint Commission universal protocol was followed.)  Yes    Medications   Medication Event Details Admin User Admin Time       Sedation: General Endotracheal Anesthesia (GET) administered and documented by Anesthesia Care Provider    Findings: Percutaneous MW ablation performed to segment 3 and segment 4A lesions with US and CT guidance.      Estimated Blood Loss: Less than 10 ml    Fluoroscopy Time:  minute(s)    SPECIMENS: None    Complications: Small right pneumothorax after hydrodissection.     Condition: Stable    Plan:   -To PACU to recover from anesthesia.   -Will order upright CXR in ~1 hour to eval for change in pneumothorax.   -If returns to baseline he can be discharged to home today  -Will follow up in IR clinic with Dr. Scott in 1 month with MRI.      Comments: See dictated procedure note for full details.    Case Mcclellan MD

## 2019-01-23 NOTE — PROGRESS NOTES
Patient Name: Frandy Workman  Medical Record Number: [id number]  Today's Date: 1/23/2019    Procedure: Hepatic Microwave Ablation  Proceduralist: Leonardo    Procedure start time: 0847  Puncture time: 0852    D: Pt here via cart, accompanied by anesthesia staff.  Pt positioned supine on the CT table with head going first into the scanner. Dameon hugger on lower extremities. Anesthesia managed patient care.  P: Pt care to continue in the PACU.

## 2019-01-23 NOTE — PLAN OF CARE
Observation Goals:     1.The patient will be able to transition over to oral analgesics for pain control: Pt haven't used any PCA dose this shift. Stated pain is negligible.  2. The patient will tolerate a regular diet: Tolerating clear liquid well, diet advanced to regular.  3. The patient will be able to ambulate in the hallway without assistance OR reach the pre-procedure activity level: Bedrest completed. Sitting up in bed.  4. The patient will be able urinate without difficulties: Not yet  5. Neurological status should be at the patient's baseline on admission: YES  6. Other: Right groin access intact. Vitals stable. Weaned off oxygen, pt is tolerating SPO2>97% at RA. PIV with NS at 75cc/hr via PIV. Pt to remain NPO after midnight for procedure tomorrow. Continue with cares.

## 2019-01-23 NOTE — ANESTHESIA POSTPROCEDURE EVALUATION
Anesthesia POST Procedure Evaluation    Patient: Frandy Workman   MRN:     [id number] Gender:   male   Age:    53 year old :      1965        Preoperative Diagnosis: Hepatocellular Carcinoma   Procedure(s):  Anesthesia Coverage CT Guided Liver Ablation @0800 In IR/CT Rm 2   Postop Comments: No value filed.       Anesthesia Type:  General    Reportable Event: NO     PAIN: Uncomplicated   Sign Out status: Comfortable, Well controlled pain     PONV: No PONV   Sign Out status:  No Nausea or Vomiting     Neuro/Psych: Uneventful perioperative course   Sign Out Status: Preoperative baseline; Age appropriate mentation     Airway/Resp.: Uneventful perioperative course   Sign Out Status: Non labored breathing, age appropriate RR; Resp. Status within EXPECTED Parameters     CV: Uneventful perioperative course   Sign Out status: Appropriate BP and perfusion indices; Appropriate HR/Rhythm     Disposition:   Sign Out in:  PACU  Disposition:  Home  Recovery Course: Uneventful  Follow-Up: Not required           Last Anesthesia Record Vitals:  CRNA VITALS  2019 1023 - 2019 1123      2019             NIBP:  109/52    Pulse:  72    NIBP Mean:  71    SpO2:  96 %          Last PACU/Preop Vitals:  Vitals:    19 1115 19 1130 19 1145   BP: 145/82 133/74 145/79   Pulse:  71    Resp: 16 16 16   Temp: 36.4  C (97.6  F)  36.4  C (97.6  F)   SpO2: 96% 97% 98%         Electronically Signed By: Bebeto Quintero MD, 2019, 12:01 PM

## 2019-01-23 NOTE — ANESTHESIA PREPROCEDURE EVALUATION
Anesthesia Pre-Procedure Evaluation    Patient: Frandy Workman   MRN:     [id number] Gender:   male   Age:    53 year old :      1965        Preoperative Diagnosis: Hepatocellular Carcinoma   Procedure(s):  Anesthesia Coverage CT Guided Liver Ablation @0800 In IR/CT Rm 2     Past Medical History:   Diagnosis Date     Anemia     Low blood plates current is 37     Arthritis      BPH (benign prostatic hyperplasia)      Cholelithiasis      Conductive hearing loss 2017    Have a lump on my right side of my face.  Had wax discharge     Depressive disorder     Suffer effects throughout life     Gastroesophageal reflux disease 2014    Being treated with Prilosac     HCC (hepatocellular carcinoma) (H) 2019     Hepatitis 2014    Diagnosed with schrosis ESTRADA in .  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       Past Surgical History:   Procedure Laterality Date     COLONOSCOPY           ESOPHAGOSCOPY, GASTROSCOPY, DUODENOSCOPY (EGD), COMBINED N/A 2016    Procedure: COMBINED ESOPHAGOSCOPY, GASTROSCOPY, DUODENOSCOPY (EGD);  Surgeon: Santi Rosas MD;  Location:  GI     ESOPHAGOSCOPY, GASTROSCOPY, DUODENOSCOPY (EGD), COMBINED N/A 2017    Procedure: COMBINED ESOPHAGOSCOPY, GASTROSCOPY, DUODENOSCOPY (EGD);  EGD;  Surgeon: Santi Rosas MD;  Location:  GI     ESOPHAGOSCOPY, GASTROSCOPY, DUODENOSCOPY (EGD), COMBINED N/A 2018    Procedure: EGD;  Surgeon: Santi Rosas MD;  Location:  OR     HEAD & NECK SURGERY      2017 at [facility].      IMPLANT GOLD WEIGHT EYELID Right 2017    Procedure: IMPLANT WEIGHT EYELID;  Right Upper Eyelid Weight, right tarsal strip lower eyelid;  Surgeon: Milana Malave MD;  Location:  OR     KNEE SURGERY Left      ORTHOPEDIC SURGERY        PAROTIDECTOMY, RADICAL NECK DISSECTION Right 11/2/2017    Procedure: PAROTIDECTOMY, RADICAL NECK DISSECTION;  Right Superfacial Parotidectomy , Facial nerve repair. with NIM facial nerve monitor.;  Surgeon: Asiya Morgan MD;  Location: [facility] OR     PICC INSERTION Left 11/06/2017    4fr SL BioFlo PICC, 44cm in the L basilic vein w/ tip in the low SVC     VASCULAR SURGERY            Anesthesia Evaluation     . Pt has had prior anesthetic.            ROS/MED HX    ENT/Pulmonary:       Neurologic:       Cardiovascular:     (+) Dyslipidemia, hypertension----. : . . . :. .       METS/Exercise Tolerance:     Hematologic:     (+) Anemia, -      Musculoskeletal:         GI/Hepatic:     (+) GERD hepatitis type Other, liver disease,       Renal/Genitourinary:         Endo:     (+) type II DM .      Psychiatric:     (+) psychiatric history depression      Infectious Disease:         Malignancy:         Other:                         PHYSICAL EXAM:   Mental Status/Neuro:    Airway: Facies: Feasible  Mallampati: III  Mouth/Opening: Full  TM distance: > 6 cm  Neck ROM: Full   Respiratory: Auscultation: CTAB     Resp. Rate: Normal     Resp. Effort: Normal      CV: Rhythm: Regular   Comments:                    Lab Results   Component Value Date    WBC 3.8 (L) 01/22/2019    HGB 15.9 01/22/2019    HCT 46.9 01/22/2019    PLT 38 (LL) 01/22/2019     01/22/2019    POTASSIUM 4.2 01/22/2019    CHLORIDE 110 (H) 01/22/2019    CO2 26 01/22/2019    BUN 19 01/22/2019    CR 1.06 01/22/2019     (H) 01/22/2019    DEBORAH 8.5 01/22/2019    PHOS 2.7 11/06/2017    MAG 1.8 11/06/2017    ALBUMIN 3.3 (L) 01/22/2019    PROTTOTAL 7.9 01/22/2019    ALT 45 01/22/2019    AST 50 (H) 01/22/2019    ALKPHOS 158 (H) 01/22/2019    BILITOTAL 1.5 (H) 01/22/2019    PTT 32 01/22/2019    INR 1.24 (H) 01/22/2019    TSH 0.49 08/08/2018    T4 1.21 08/08/2018       Preop Vitals  BP Readings from Last 3 Encounters:   01/22/19 126/72   01/14/19 120/61   01/07/19  "124/77    Pulse Readings from Last 3 Encounters:   01/22/19 64   01/14/19 63   01/07/19 63      Resp Readings from Last 3 Encounters:   01/22/19 16   01/14/19 16   12/28/18 16    SpO2 Readings from Last 3 Encounters:   01/22/19 98%   01/14/19 100%   01/07/19 97%      Temp Readings from Last 1 Encounters:   01/22/19 36.7  C (98  F) (Oral)    Ht Readings from Last 1 Encounters:   01/14/19 1.753 m (5' 9\")      Wt Readings from Last 1 Encounters:   01/14/19 85.7 kg (189 lb)    Estimated body mass index is 27.91 kg/m  as calculated from the following:    Height as of 1/14/19: 1.753 m (5' 9\").    Weight as of 1/14/19: 85.7 kg (189 lb).     LDA:  Peripheral IV 01/22/19 Left Lower forearm (Active)   Site Assessment WDL 1/22/2019  4:00 PM   Line Status Infusing 1/22/2019  4:00 PM   Phlebitis Scale 0-->no symptoms 1/22/2019  4:00 PM   Infiltration Scale 0 1/22/2019  4:00 PM   Infiltration Site Treatment Method  None 1/22/2019  4:00 PM   Extravasation? No 1/22/2019  4:00 PM   Dressing Intervention New dressing  1/22/2019 10:45 AM   Number of days: 0            Assessment:   ASA SCORE: 3       Documentation: H&P complete; Preop Testing complete; Consents complete   Proceeding: Proceed without further delay  Tobacco Use:  Active user of Tobacco     Plan:   Anes. Type:  General   Pre-Induction: Midazolam IV; Acetaminophen PO   Induction:  IV (Standard)   Airway: Oral ETT   Access/Monitoring: PIV   Maintenance: Balanced   Emergence: Procedure Site   Logistics: Same Day Surgery     Postop Pain/Sedation Strategy:  Standard-Options: Opioids PRN     PONV Management:  Adult Risk Factors:, Postop Opioids  Prevention: Ondansetron; Dexamethasone                         Pola Quick MD  "

## 2019-01-23 NOTE — PLAN OF CARE
1.The patient will be able to transition over to oral analgesics for pain control: NPO for procedure today. Minimal IV pain meds admin  2. The patient will tolerate a regular diet: tolerated until NPO for ablation  3. The patient will be able to ambulate in the hallway without assistance OR reach the pre-procedure activity level: Yes  4. The patient will be able urinate without difficulties: Yes  5. Neurological status should be at the patient's baseline on admission: Yes     Pt off floor for ablation

## 2019-01-23 NOTE — PROGRESS NOTES
Discharge instructions given and questions answered.  Pt. Able to verbalize understanding.  IV's removed.  Pt. Has a ride home.  Pt. Has all belongngs.

## 2019-01-23 NOTE — PROGRESS NOTES
Interventional Radiology Note    Patient seen at the bedside for follow-up after his ablation procedure this morning.  He reports that he is overall feeling well, though he does complain of some right anterior and posterior chest pain, which is developed since the procedure.  Does not appear to be associated with movement or exercise.  Has only mild abdominal pain.  Has not yet eaten, but denies nausea or vomiting.  He denies shortness of breath.  Overall feels sleepy.    His abdomen is soft nontender.    His procedure was comp gated by small pneumothorax, which did not appear significantly changed on follow-up x-ray.    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  His lesion was comp gated by small right-sided pneumothorax.  He is now endorsing some right-sided chest and back pain, which I suspect is related to the procedure.    -If patient tolerates a regular diet and is able to ambulate and pain is controlled with oral medication, agree with discharge to home today.  If these goals are not met, another day of observation would be appropriate.    -In regards to pneumothorax, this requires no further dedicated follow-up as long as he does not develop shortness of breath.  I discussed this complication with him and reinforce instructions to seek medical care if he develops shortness of breath in the coming days.  I consider expansion of the pneumothorax to be low risk, given that the pneumothorax was a direct result of the pneumodissection.  The lung was not injured during the procedure.    Discussed the patient with Pippa SNYDER.  Appreciate the oncology services excellent care of this patient.    Case Mcclellan MD  Interventional Radiology Fellow  [phone] (personal pager)  [phone] (IR service pass pager)

## 2019-01-24 ENCOUNTER — HOSPITAL ENCOUNTER (INPATIENT)
Facility: CLINIC | Age: 54
LOS: 1 days | Discharge: HOME | DRG: 199 | End: 2019-01-26
Attending: EMERGENCY MEDICINE | Admitting: INTERNAL MEDICINE
Payer: MEDICARE

## 2019-01-24 ENCOUNTER — APPOINTMENT (OUTPATIENT)
Dept: GENERAL RADIOLOGY | Facility: CLINIC | Age: 54
DRG: 199 | End: 2019-01-24
Payer: MEDICARE

## 2019-01-24 ENCOUNTER — TELEPHONE (OUTPATIENT)
Dept: INTERNAL MEDICINE | Facility: CLINIC | Age: 54
End: 2019-01-24

## 2019-01-24 DIAGNOSIS — M25.512 ACUTE PAIN OF BOTH SHOULDERS: ICD-10-CM

## 2019-01-24 DIAGNOSIS — C22.0 HCC (HEPATOCELLULAR CARCINOMA) (H): Primary | ICD-10-CM

## 2019-01-24 DIAGNOSIS — M25.511 ACUTE PAIN OF BOTH SHOULDERS: ICD-10-CM

## 2019-01-24 DIAGNOSIS — J95.811 POSTPROCEDURAL PNEUMOTHORAX: ICD-10-CM

## 2019-01-24 PROBLEM — J93.9 PNEUMOTHORAX: Status: ACTIVE | Noted: 2019-01-24

## 2019-01-24 LAB
ALBUMIN SERPL-MCNC: 3.1 G/DL (ref 3.4–5)
ALP SERPL-CCNC: 91 U/L (ref 40–150)
ALT SERPL W P-5'-P-CCNC: 394 U/L (ref 0–70)
AMMONIA PLAS-SCNC: 28 UMOL/L (ref 10–50)
ANION GAP SERPL CALCULATED.3IONS-SCNC: 12 MMOL/L (ref 3–14)
AST SERPL W P-5'-P-CCNC: 721 U/L (ref 0–45)
BASOPHILS # BLD AUTO: 0 10E9/L (ref 0–0.2)
BASOPHILS NFR BLD AUTO: 0.3 %
BILIRUB SERPL-MCNC: 4.9 MG/DL (ref 0.2–1.3)
BUN SERPL-MCNC: 31 MG/DL (ref 7–30)
CALCIUM SERPL-MCNC: 8.3 MG/DL (ref 8.5–10.1)
CHLORIDE SERPL-SCNC: 106 MMOL/L (ref 94–109)
CO2 SERPL-SCNC: 22 MMOL/L (ref 20–32)
CREAT SERPL-MCNC: 1.18 MG/DL (ref 0.66–1.25)
DIFFERENTIAL METHOD BLD: ABNORMAL
EOSINOPHIL # BLD AUTO: 0 10E9/L (ref 0–0.7)
EOSINOPHIL NFR BLD AUTO: 0.5 %
ERYTHROCYTE [DISTWIDTH] IN BLOOD BY AUTOMATED COUNT: 14.4 % (ref 10–15)
GFR SERPL CREATININE-BSD FRML MDRD: 69 ML/MIN/{1.73_M2}
GLUCOSE BLDC GLUCOMTR-MCNC: 175 MG/DL (ref 70–99)
GLUCOSE SERPL-MCNC: 141 MG/DL (ref 70–99)
HCT VFR BLD AUTO: 43.4 % (ref 40–53)
HGB BLD-MCNC: 14.9 G/DL (ref 13.3–17.7)
IMM GRANULOCYTES # BLD: 0 10E9/L (ref 0–0.4)
IMM GRANULOCYTES NFR BLD: 0.5 %
LYMPHOCYTES # BLD AUTO: 0.5 10E9/L (ref 0.8–5.3)
LYMPHOCYTES NFR BLD AUTO: 6.2 %
MCH RBC QN AUTO: 36.9 PG (ref 26.5–33)
MCHC RBC AUTO-ENTMCNC: 34.3 G/DL (ref 31.5–36.5)
MCV RBC AUTO: 107 FL (ref 78–100)
MONOCYTES # BLD AUTO: 0.6 10E9/L (ref 0–1.3)
MONOCYTES NFR BLD AUTO: 7.9 %
NEUTROPHILS # BLD AUTO: 6.4 10E9/L (ref 1.6–8.3)
NEUTROPHILS NFR BLD AUTO: 84.6 %
NRBC # BLD AUTO: 0 10*3/UL
NRBC BLD AUTO-RTO: 0 /100
PLATELET # BLD AUTO: 35 10E9/L (ref 150–450)
POTASSIUM SERPL-SCNC: 4.5 MMOL/L (ref 3.4–5.3)
PROT SERPL-MCNC: 7.5 G/DL (ref 6.8–8.8)
RADIOLOGIST FLAGS: ABNORMAL
RBC # BLD AUTO: 4.04 10E12/L (ref 4.4–5.9)
SODIUM SERPL-SCNC: 139 MMOL/L (ref 133–144)
TROPONIN I SERPL-MCNC: <0.015 UG/L (ref 0–0.04)
WBC # BLD AUTO: 7.6 10E9/L (ref 4–11)

## 2019-01-24 PROCEDURE — 96374 THER/PROPH/DIAG INJ IV PUSH: CPT

## 2019-01-24 PROCEDURE — 93005 ELECTROCARDIOGRAM TRACING: CPT

## 2019-01-24 PROCEDURE — 85025 COMPLETE CBC W/AUTO DIFF WBC: CPT | Performed by: EMERGENCY MEDICINE

## 2019-01-24 PROCEDURE — 71046 X-RAY EXAM CHEST 2 VIEWS: CPT

## 2019-01-24 PROCEDURE — 82140 ASSAY OF AMMONIA: CPT | Performed by: EMERGENCY MEDICINE

## 2019-01-24 PROCEDURE — 00000146 ZZHCL STATISTIC GLUCOSE BY METER IP

## 2019-01-24 PROCEDURE — G0378 HOSPITAL OBSERVATION PER HR: HCPCS

## 2019-01-24 PROCEDURE — 25000132 ZZH RX MED GY IP 250 OP 250 PS 637: Mod: GY | Performed by: INTERNAL MEDICINE

## 2019-01-24 PROCEDURE — 84484 ASSAY OF TROPONIN QUANT: CPT | Performed by: EMERGENCY MEDICINE

## 2019-01-24 PROCEDURE — 93010 ELECTROCARDIOGRAM REPORT: CPT | Mod: Z6 | Performed by: EMERGENCY MEDICINE

## 2019-01-24 PROCEDURE — 80053 COMPREHEN METABOLIC PANEL: CPT | Performed by: EMERGENCY MEDICINE

## 2019-01-24 PROCEDURE — 99284 EMERGENCY DEPT VISIT MOD MDM: CPT | Mod: 25 | Performed by: EMERGENCY MEDICINE

## 2019-01-24 PROCEDURE — A9270 NON-COVERED ITEM OR SERVICE: HCPCS | Mod: GY | Performed by: INTERNAL MEDICINE

## 2019-01-24 PROCEDURE — 73030 X-RAY EXAM OF SHOULDER: CPT | Mod: LT

## 2019-01-24 PROCEDURE — 25000128 H RX IP 250 OP 636: Performed by: EMERGENCY MEDICINE

## 2019-01-24 PROCEDURE — 99285 EMERGENCY DEPT VISIT HI MDM: CPT | Mod: 25

## 2019-01-24 RX ORDER — CARVEDILOL 6.25 MG/1
12.5 TABLET ORAL 2 TIMES DAILY WITH MEALS
Status: DISCONTINUED | OUTPATIENT
Start: 2019-01-24 | End: 2019-01-26 | Stop reason: HOSPADM

## 2019-01-24 RX ORDER — NALOXONE HYDROCHLORIDE 0.4 MG/ML
.1-.4 INJECTION, SOLUTION INTRAMUSCULAR; INTRAVENOUS; SUBCUTANEOUS
Status: DISCONTINUED | OUTPATIENT
Start: 2019-01-24 | End: 2019-01-26 | Stop reason: HOSPADM

## 2019-01-24 RX ORDER — IBUPROFEN 600 MG/1
600 TABLET, FILM COATED ORAL EVERY 6 HOURS PRN
Status: DISCONTINUED | OUTPATIENT
Start: 2019-01-24 | End: 2019-01-26 | Stop reason: HOSPADM

## 2019-01-24 RX ORDER — ONDANSETRON 4 MG/1
4 TABLET, ORALLY DISINTEGRATING ORAL EVERY 6 HOURS PRN
Status: DISCONTINUED | OUTPATIENT
Start: 2019-01-24 | End: 2019-01-26 | Stop reason: HOSPADM

## 2019-01-24 RX ORDER — POTASSIUM CHLORIDE 7.45 MG/ML
10 INJECTION INTRAVENOUS
Status: DISCONTINUED | OUTPATIENT
Start: 2019-01-24 | End: 2019-01-26 | Stop reason: HOSPADM

## 2019-01-24 RX ORDER — POTASSIUM CL/LIDO/0.9 % NACL 10MEQ/0.1L
10 INTRAVENOUS SOLUTION, PIGGYBACK (ML) INTRAVENOUS
Status: DISCONTINUED | OUTPATIENT
Start: 2019-01-24 | End: 2019-01-26 | Stop reason: HOSPADM

## 2019-01-24 RX ORDER — ACETAMINOPHEN 325 MG/1
650 TABLET ORAL EVERY 4 HOURS PRN
Status: DISCONTINUED | OUTPATIENT
Start: 2019-01-24 | End: 2019-01-26 | Stop reason: HOSPADM

## 2019-01-24 RX ORDER — TAMSULOSIN HYDROCHLORIDE 0.4 MG/1
0.4 CAPSULE ORAL DAILY
Status: DISCONTINUED | OUTPATIENT
Start: 2019-01-25 | End: 2019-01-26 | Stop reason: HOSPADM

## 2019-01-24 RX ORDER — POTASSIUM CHLORIDE 1.5 G/1.58G
20-40 POWDER, FOR SOLUTION ORAL
Status: DISCONTINUED | OUTPATIENT
Start: 2019-01-24 | End: 2019-01-26 | Stop reason: HOSPADM

## 2019-01-24 RX ORDER — CAPSAICIN 0.025 %
CREAM (GRAM) TOPICAL 3 TIMES DAILY PRN
Status: DISCONTINUED | OUTPATIENT
Start: 2019-01-24 | End: 2019-01-26 | Stop reason: HOSPADM

## 2019-01-24 RX ORDER — DEXTROSE MONOHYDRATE 25 G/50ML
25-50 INJECTION, SOLUTION INTRAVENOUS
Status: DISCONTINUED | OUTPATIENT
Start: 2019-01-24 | End: 2019-01-26 | Stop reason: HOSPADM

## 2019-01-24 RX ORDER — MICONAZOLE NITRATE 20 MG/G
CREAM TOPICAL 2 TIMES DAILY
Status: DISCONTINUED | OUTPATIENT
Start: 2019-01-25 | End: 2019-01-26 | Stop reason: HOSPADM

## 2019-01-24 RX ORDER — NICOTINE POLACRILEX 4 MG
15-30 LOZENGE BUCCAL
Status: DISCONTINUED | OUTPATIENT
Start: 2019-01-24 | End: 2019-01-26 | Stop reason: HOSPADM

## 2019-01-24 RX ORDER — LANOLIN ALCOHOL/MO/W.PET/CERES
1000 CREAM (GRAM) TOPICAL EVERY MORNING
Status: DISCONTINUED | OUTPATIENT
Start: 2019-01-25 | End: 2019-01-26 | Stop reason: HOSPADM

## 2019-01-24 RX ORDER — ACETAMINOPHEN 650 MG/1
650 SUPPOSITORY RECTAL EVERY 4 HOURS PRN
Status: DISCONTINUED | OUTPATIENT
Start: 2019-01-24 | End: 2019-01-26 | Stop reason: HOSPADM

## 2019-01-24 RX ORDER — POTASSIUM CHLORIDE 750 MG/1
20-40 TABLET, EXTENDED RELEASE ORAL
Status: DISCONTINUED | OUTPATIENT
Start: 2019-01-24 | End: 2019-01-26 | Stop reason: HOSPADM

## 2019-01-24 RX ORDER — ERYTHROMYCIN 5 MG/G
OINTMENT OPHTHALMIC 3 TIMES DAILY
Status: DISCONTINUED | OUTPATIENT
Start: 2019-01-24 | End: 2019-01-25

## 2019-01-24 RX ORDER — LACTULOSE 10 G/15ML
30 SOLUTION ORAL 4 TIMES DAILY
Status: DISCONTINUED | OUTPATIENT
Start: 2019-01-24 | End: 2019-01-26 | Stop reason: HOSPADM

## 2019-01-24 RX ORDER — MAGNESIUM SULFATE HEPTAHYDRATE 40 MG/ML
4 INJECTION, SOLUTION INTRAVENOUS EVERY 4 HOURS PRN
Status: DISCONTINUED | OUTPATIENT
Start: 2019-01-24 | End: 2019-01-26 | Stop reason: HOSPADM

## 2019-01-24 RX ORDER — PROCHLORPERAZINE 25 MG
25 SUPPOSITORY, RECTAL RECTAL EVERY 12 HOURS PRN
Status: DISCONTINUED | OUTPATIENT
Start: 2019-01-24 | End: 2019-01-26 | Stop reason: HOSPADM

## 2019-01-24 RX ORDER — OLANZAPINE 2.5 MG/1
2.5 TABLET, FILM COATED ORAL AT BEDTIME
Status: DISCONTINUED | OUTPATIENT
Start: 2019-01-24 | End: 2019-01-26 | Stop reason: HOSPADM

## 2019-01-24 RX ORDER — DOCUSATE SODIUM 100 MG/1
100 CAPSULE, LIQUID FILLED ORAL 2 TIMES DAILY
Status: DISCONTINUED | OUTPATIENT
Start: 2019-01-24 | End: 2019-01-26 | Stop reason: HOSPADM

## 2019-01-24 RX ORDER — POTASSIUM CHLORIDE 750 MG/1
20 TABLET, EXTENDED RELEASE ORAL DAILY
Status: DISCONTINUED | OUTPATIENT
Start: 2019-01-25 | End: 2019-01-26 | Stop reason: HOSPADM

## 2019-01-24 RX ORDER — LIDOCAINE 40 MG/G
CREAM TOPICAL
Status: DISCONTINUED | OUTPATIENT
Start: 2019-01-24 | End: 2019-01-26 | Stop reason: HOSPADM

## 2019-01-24 RX ORDER — ONDANSETRON 2 MG/ML
4 INJECTION INTRAMUSCULAR; INTRAVENOUS EVERY 6 HOURS PRN
Status: DISCONTINUED | OUTPATIENT
Start: 2019-01-24 | End: 2019-01-26 | Stop reason: HOSPADM

## 2019-01-24 RX ORDER — POTASSIUM CHLORIDE 29.8 MG/ML
20 INJECTION INTRAVENOUS
Status: DISCONTINUED | OUTPATIENT
Start: 2019-01-24 | End: 2019-01-26 | Stop reason: HOSPADM

## 2019-01-24 RX ORDER — PROCHLORPERAZINE MALEATE 10 MG
10 TABLET ORAL EVERY 6 HOURS PRN
Status: DISCONTINUED | OUTPATIENT
Start: 2019-01-24 | End: 2019-01-26 | Stop reason: HOSPADM

## 2019-01-24 RX ORDER — TRAMADOL HYDROCHLORIDE 50 MG/1
50 TABLET ORAL EVERY 6 HOURS PRN
Status: DISCONTINUED | OUTPATIENT
Start: 2019-01-24 | End: 2019-01-26 | Stop reason: HOSPADM

## 2019-01-24 RX ORDER — KETOROLAC TROMETHAMINE 15 MG/ML
15 INJECTION, SOLUTION INTRAMUSCULAR; INTRAVENOUS ONCE
Status: COMPLETED | OUTPATIENT
Start: 2019-01-24 | End: 2019-01-24

## 2019-01-24 RX ORDER — OXYCODONE HYDROCHLORIDE 5 MG/1
5 TABLET ORAL EVERY 6 HOURS PRN
Status: DISCONTINUED | OUTPATIENT
Start: 2019-01-24 | End: 2019-01-26 | Stop reason: HOSPADM

## 2019-01-24 RX ORDER — PRAVASTATIN SODIUM 20 MG
20 TABLET ORAL DAILY
Status: DISCONTINUED | OUTPATIENT
Start: 2019-01-25 | End: 2019-01-24

## 2019-01-24 RX ADMIN — KETOROLAC TROMETHAMINE 15 MG: 15 INJECTION, SOLUTION INTRAMUSCULAR; INTRAVENOUS at 18:20

## 2019-01-24 RX ADMIN — LACTULOSE 30 G: 20 SOLUTION ORAL at 22:46

## 2019-01-24 RX ADMIN — RIFAXIMIN 550 MG: 550 TABLET ORAL at 22:47

## 2019-01-24 RX ADMIN — CARVEDILOL 12.5 MG: 6.25 TABLET, FILM COATED ORAL at 22:47

## 2019-01-24 RX ADMIN — OLANZAPINE 2.5 MG: 2.5 TABLET, FILM COATED ORAL at 22:47

## 2019-01-24 ASSESSMENT — ENCOUNTER SYMPTOMS
SHORTNESS OF BREATH: 1
PALPITATIONS: 0
SORE THROAT: 0
COUGH: 1
NECK STIFFNESS: 0
NUMBNESS: 0
ARTHRALGIAS: 1
ABDOMINAL PAIN: 0
MYALGIAS: 1
DYSURIA: 0
VOMITING: 0
FEVER: 0
WHEEZING: 0
HEADACHES: 0
CONFUSION: 1
DIARRHEA: 0
NAUSEA: 0
CHILLS: 0
LIGHT-HEADEDNESS: 0

## 2019-01-24 ASSESSMENT — MIFFLIN-ST. JEOR
SCORE: 1687.68
SCORE: 1718.07

## 2019-01-24 ASSESSMENT — PAIN DESCRIPTION - DESCRIPTORS: DESCRIPTORS: SHARP

## 2019-01-24 NOTE — TELEPHONE ENCOUNTER
Spoke with patient, reports pain in left sholder and difficulty breathing because of pain, not able to come in for 220 appt today, pt not able to drive and was going to ask neighbor. Discussed with Dr Bowen, if patient is not able to come in for appt today needs to go to ED. Called patient back not able to make appt advised to go to ED, patient verbalized understanding and will go to ED.

## 2019-01-24 NOTE — TELEPHONE ENCOUNTER
"[facility] Call Center    Phone Message    May a detailed message be left on voicemail: yes    Reason for Call: Other: Pt called in to request an appt for an x-ray. Writer noted that pt had had imaging done 1/23 including an \"x-ray port\"; pt did not recall this appointment. Pt stated he was confused and not certain who he should see. He stated he had been transferred by the imaging scheduler to the primary care line. Please call pt to discuss-he seems very confused. (Per pt request writer did schedule for appt tomorrow 7am)    Action Taken: Message routed to:  [facility] TRACI  "

## 2019-01-24 NOTE — ED PROVIDER NOTES
"  History     Chief Complaint   Patient presents with     Shoulder Pain     bilat (L worse than R)     Shortness of Breath     HPI  Frandy Workman is a 53 year old male who has a past medical history of hepatocellular carcinoma, secondary pneumothorax presenting with ongoing pain since surgery.  Had an ablation done on the 23rd of this month.  Has been having ongoing pain to his bilateral shoulder since then.  This is been a little short of breath but otherwise breathing well.  Denies alcohol use.  Patient says he took an oxycodone this morning which made him very tired and confused.  Patient otherwise feels okay.  Denies vomiting, diarrhea, nausea.  He has had mild cough that is not been productive.  Denies headache or neck pain.    I have reviewed the Medications, Allergies, Past Medical and Surgical History, and Social History in the Epic system.    Review of Systems   Constitutional: Negative for chills and fever.   HENT: Negative for sore throat and tinnitus.    Respiratory: Positive for cough and shortness of breath. Negative for wheezing.    Cardiovascular: Negative for chest pain and palpitations.   Gastrointestinal: Negative for abdominal pain, diarrhea, nausea and vomiting.   Genitourinary: Negative for dysuria.   Musculoskeletal: Positive for arthralgias and myalgias. Negative for neck stiffness.   Neurological: Negative for light-headedness, numbness and headaches.   Psychiatric/Behavioral: Positive for confusion (after oxycodone).       Physical Exam   BP: 128/68  Pulse: 96  Heart Rate: 96  Temp: 100  F (37.8  C)  Resp: 18  Height: 175.3 cm (5' 9\")  Weight: 85.7 kg (189 lb)  SpO2: 96 %      Physical Exam  Physical Exam   Constitutional: oriented to person, place, and time. appears well-developed and well-nourished.   HENT:   Head: Normocephalic and atraumatic.   Neck: Normal range of motion.   Pulmonary/Chest: Effort normal. No respiratory distress. Breath sounds equal bilaterally.  Cardiac: No " murmurs, rubs, gallops. RRR.  Abdominal: Abdomen soft, nontender, nondistended. No rebound tenderness.  MSK: Long bones without deformity or evidence of trauma.  Pain with extension and adduction of shoulders, no evidence of trauma.  No tenderness palpation over the shoulders.  No bruising or swelling of bilateral shoulders.  Range of motion passively intact Without pain.  Neurological: alert and oriented to person, place, and time.   Skin: Skin is warm and dry.   Psychiatric:  normal mood and affect.  behavior is normal. Thought content normal.     ED Course        Procedures             EKG Interpretation:      Interpreted by Bebeto Tucker  Time reviewed: 1850  Symptoms at time of EKG: SOB   Rhythm: normal sinus   Rate: normal  Axis: normal  Ectopy: none  Conduction: normal  ST Segments/ T Waves: T wave slightly flattened in the precordial leads, V5 and V6, otherwise largely unchanged  Q Waves: none  Comparison to prior: Similar to prior EKG except for T wave flattening noted as above    Clinical Impression: Nonspecific T wave changes, no evidence of ischemia or infarction.    Results for orders placed or performed during the hospital encounter of 01/24/19   XR Chest 2 Views    Impression    Impression:   1. New small left-sided pneumothorax. The right-sided pneumothorax is  not well evaluated on this exam and is better seen on the dedicated  shoulder x-ray.  2. Pneumoperitoneum likely related to recent intra-abdominal  procedure.  3. Low lung volumes and streaky bibasilar opacities, likely  atelectasis.      [Result: Bilateral pneumothoraces and pneumoperitoneum]    Finding was identified on 1/24/2019 6:55 PM.     Dr. Willie Tucker was contacted by Dr. Piedra on 1/24/2019 7:00 PM  and verbalized understanding of the result.   XR Shoulder Left G/E 3 Views    Narrative    This is a preliminary resident report. Full report will follow.      Impression    Impression:   1. No fracture.  2. Small left apical  pneumothorax, better appreciated on same-day  chest radiograph.  3. Pneumoperitoneum, status-post microwave ablation of HCC.   XR Shoulder Right G/E 3 Views   Result Value Ref Range    Radiologist flags Pneumothorax and pneumoperitoneum (Urgent)     Narrative    This is a preliminary resident report. Full report will follow.      Impression    Impression:   1. No acute abnormality of the right shoulder.  2. Small right apical pneumothorax with right basilar atelectasis.  3. Pneumoperitoneum, status-post microwave ablation of HCC.    [Urgent Result: Pneumothorax and pneumoperitoneum]    Finding was identified on 1/24/2019 7:01 PM.     Dr. Tucker was contacted by Dr. Piedra at 1/24/2019 7:03 PM and  verbalized understanding of the urgent finding.    CBC with platelets differential   Result Value Ref Range    WBC 7.6 4.0 - 11.0 10e9/L    RBC Count 4.04 (L) 4.4 - 5.9 10e12/L    Hemoglobin 14.9 13.3 - 17.7 g/dL    Hematocrit 43.4 40.0 - 53.0 %     (H) 78 - 100 fl    MCH 36.9 (H) 26.5 - 33.0 pg    MCHC 34.3 31.5 - 36.5 g/dL    RDW 14.4 10.0 - 15.0 %    Platelet Count 35 (LL) 150 - 450 10e9/L    Diff Method Automated Method     % Neutrophils 84.6 %    % Lymphocytes 6.2 %    % Monocytes 7.9 %    % Eosinophils 0.5 %    % Basophils 0.3 %    % Immature Granulocytes 0.5 %    Nucleated RBCs 0 0 /100    Absolute Neutrophil 6.4 1.6 - 8.3 10e9/L    Absolute Lymphocytes 0.5 (L) 0.8 - 5.3 10e9/L    Absolute Monocytes 0.6 0.0 - 1.3 10e9/L    Absolute Eosinophils 0.0 0.0 - 0.7 10e9/L    Absolute Basophils 0.0 0.0 - 0.2 10e9/L    Abs Immature Granulocytes 0.0 0 - 0.4 10e9/L    Absolute Nucleated RBC 0.0    Ammonia   Result Value Ref Range    Ammonia 28 10 - 50 umol/L       Labs Ordered and Resulted from Time of ED Arrival Up to the Time of Departure from the ED - No data to display         Assessments & Plan (with Medical Decision Making)   MDM  Patient presenting with ongoing shoulder pain after surgery.  The ablation was  comp gated by pneumothorax, will repeat chest x-ray to assess for this.  Patient looks like he is breathing comfortably, oxygen 96% on room air.  Patient otherwise appears nontoxic. No swelling to suggest DVT. Good ROM without fever, unlikely septic arthritis.     Re eval: Chest x-ray does show bilateral pneumothorax, these are both apical and very small.  Patient is not having significant shortness of breath, more pain in the shoulders.  I do believe the pain in the shoulders is referred from his bilateral pneumothoraces.  I discussed with interventional radiology who would like to observe pneumothoraces, patient is not in respiratory distress but not feeling the chest tube urgently.  Patient agrees with this plan.  Toradol did help his pain.    I have reviewed the nursing notes.    I have reviewed the findings, diagnosis, plan and need for follow up with the patient.       Medication List      There are no discharge medications for this visit.         Final diagnoses:   Acute pain of both shoulders       1/24/2019   [facility], [place], EMERGENCY DEPARTMENT     Bebeto Tucker MD  01/24/19 1917    Addendum: On repeat discussion with radiology, the previous shoulder x-ray was mislabeled.  Distal.  This patient has a new small left pneumothorax.  Patient continues to be observed and is having no difficulty breathing.  Patient will be continued to be observed prior to admission.       Bebeto Tucker MD  01/24/19 5845

## 2019-01-24 NOTE — ED NOTES
Initial Assessment: VSS. Communicates needs without difficulty. Pleasant and co-op. Pt has c/o bi-lat shoulder pain. Pt does not give an accurate description of the pain. Pt dressing (gowning) and moving shoulders freely.

## 2019-01-24 NOTE — ED TRIAGE NOTES
"ED TRIAGE    Medical / Trauma C/o:  53-yr male patient - presenting to ED for (re-) eval of bilat shoulder pain and c/o SOB/Wheezing (slightly on expiration).  Patient seen recently for shoulder pains and Liver CA, chemo.    Duration of C/o:  The last few days    Contributing Factors / Concerning HX:  See HX    Significant Med's / Tx's:  See med's    Febrile / Afebrile:  Febrile:  100F    Patient Vitals for the past 24 hrs:   BP Temp Temp src Pulse Heart Rate Resp SpO2 Height Weight   01/24/19 1548 128/68 100  F (37.8  C) Oral 96 96 18 96 % 1.753 m (5' 9\") 85.7 kg (189 lb)       Arie العراقي  January 24, 2019  3:51 PM  "

## 2019-01-25 ENCOUNTER — APPOINTMENT (OUTPATIENT)
Dept: GENERAL RADIOLOGY | Facility: CLINIC | Age: 54
DRG: 199 | End: 2019-01-25
Payer: MEDICARE

## 2019-01-25 DIAGNOSIS — C22.0 HCC (HEPATOCELLULAR CARCINOMA) (H): Primary | ICD-10-CM

## 2019-01-25 PROBLEM — R50.9 FEVER OF UNKNOWN ORIGIN: Status: ACTIVE | Noted: 2019-01-25

## 2019-01-25 LAB
ALBUMIN UR-MCNC: NEGATIVE MG/DL
ANION GAP SERPL CALCULATED.3IONS-SCNC: 6 MMOL/L (ref 3–14)
APPEARANCE UR: CLEAR
BILIRUB UR QL STRIP: NEGATIVE
BUN SERPL-MCNC: 36 MG/DL (ref 7–30)
CALCIUM SERPL-MCNC: 7.6 MG/DL (ref 8.5–10.1)
CHLORIDE SERPL-SCNC: 106 MMOL/L (ref 94–109)
CO2 SERPL-SCNC: 26 MMOL/L (ref 20–32)
COLOR UR AUTO: YELLOW
CREAT SERPL-MCNC: 1.31 MG/DL (ref 0.66–1.25)
ERYTHROCYTE [DISTWIDTH] IN BLOOD BY AUTOMATED COUNT: 14 % (ref 10–15)
FLUAV+FLUBV RNA SPEC QL NAA+PROBE: NEGATIVE
FLUAV+FLUBV RNA SPEC QL NAA+PROBE: NEGATIVE
GFR SERPL CREATININE-BSD FRML MDRD: 61 ML/MIN/{1.73_M2}
GLUCOSE BLDC GLUCOMTR-MCNC: 114 MG/DL (ref 70–99)
GLUCOSE BLDC GLUCOMTR-MCNC: 132 MG/DL (ref 70–99)
GLUCOSE BLDC GLUCOMTR-MCNC: 201 MG/DL (ref 70–99)
GLUCOSE BLDC GLUCOMTR-MCNC: 211 MG/DL (ref 70–99)
GLUCOSE BLDC GLUCOMTR-MCNC: 230 MG/DL (ref 70–99)
GLUCOSE SERPL-MCNC: 196 MG/DL (ref 70–99)
GLUCOSE UR STRIP-MCNC: >1000 MG/DL
HCT VFR BLD AUTO: 38.6 % (ref 40–53)
HGB BLD-MCNC: 13 G/DL (ref 13.3–17.7)
HGB UR QL STRIP: NEGATIVE
INTERPRETATION ECG - MUSE: NORMAL
KETONES UR STRIP-MCNC: NEGATIVE MG/DL
LACTATE BLD-SCNC: 1.5 MMOL/L (ref 0.7–2)
LEUKOCYTE ESTERASE UR QL STRIP: NEGATIVE
MAGNESIUM SERPL-MCNC: 2 MG/DL (ref 1.6–2.3)
MCH RBC QN AUTO: 36.2 PG (ref 26.5–33)
MCHC RBC AUTO-ENTMCNC: 33.7 G/DL (ref 31.5–36.5)
MCV RBC AUTO: 108 FL (ref 78–100)
MUCOUS THREADS #/AREA URNS LPF: PRESENT /LPF
NITRATE UR QL: NEGATIVE
PH UR STRIP: 5 PH (ref 5–7)
PHOSPHATE SERPL-MCNC: 2.1 MG/DL (ref 2.5–4.5)
PLATELET # BLD AUTO: 26 10E9/L (ref 150–450)
POTASSIUM SERPL-SCNC: 4 MMOL/L (ref 3.4–5.3)
RBC # BLD AUTO: 3.59 10E12/L (ref 4.4–5.9)
RBC #/AREA URNS AUTO: <1 /HPF (ref 0–2)
RSV RNA SPEC NAA+PROBE: NEGATIVE
SODIUM SERPL-SCNC: 139 MMOL/L (ref 133–144)
SOURCE: ABNORMAL
SP GR UR STRIP: 1.02 (ref 1–1.03)
SPECIMEN SOURCE: NORMAL
SQUAMOUS #/AREA URNS AUTO: <1 /HPF (ref 0–1)
UROBILINOGEN UR STRIP-MCNC: 2 MG/DL (ref 0–2)
WBC # BLD AUTO: 4.7 10E9/L (ref 4–11)
WBC #/AREA URNS AUTO: <1 /HPF (ref 0–5)

## 2019-01-25 PROCEDURE — 25000125 ZZHC RX 250: Performed by: INTERNAL MEDICINE

## 2019-01-25 PROCEDURE — 36415 COLL VENOUS BLD VENIPUNCTURE: CPT | Performed by: INTERNAL MEDICINE

## 2019-01-25 PROCEDURE — 80048 BASIC METABOLIC PNL TOTAL CA: CPT | Performed by: INTERNAL MEDICINE

## 2019-01-25 PROCEDURE — 81001 URINALYSIS AUTO W/SCOPE: CPT | Performed by: INTERNAL MEDICINE

## 2019-01-25 PROCEDURE — 83605 ASSAY OF LACTIC ACID: CPT | Performed by: INTERNAL MEDICINE

## 2019-01-25 PROCEDURE — 87040 BLOOD CULTURE FOR BACTERIA: CPT | Performed by: INTERNAL MEDICINE

## 2019-01-25 PROCEDURE — 99233 SBSQ HOSP IP/OBS HIGH 50: CPT | Performed by: INTERNAL MEDICINE

## 2019-01-25 PROCEDURE — 96375 TX/PRO/DX INJ NEW DRUG ADDON: CPT

## 2019-01-25 PROCEDURE — 25000131 ZZH RX MED GY IP 250 OP 636 PS 637: Mod: GY | Performed by: INTERNAL MEDICINE

## 2019-01-25 PROCEDURE — 96376 TX/PRO/DX INJ SAME DRUG ADON: CPT

## 2019-01-25 PROCEDURE — 12000001 ZZH R&B MED SURG/OB UMMC

## 2019-01-25 PROCEDURE — 85027 COMPLETE CBC AUTOMATED: CPT | Performed by: INTERNAL MEDICINE

## 2019-01-25 PROCEDURE — 96372 THER/PROPH/DIAG INJ SC/IM: CPT

## 2019-01-25 PROCEDURE — 00000146 ZZHCL STATISTIC GLUCOSE BY METER IP

## 2019-01-25 PROCEDURE — G0378 HOSPITAL OBSERVATION PER HR: HCPCS

## 2019-01-25 PROCEDURE — 83735 ASSAY OF MAGNESIUM: CPT | Performed by: INTERNAL MEDICINE

## 2019-01-25 PROCEDURE — 87631 RESP VIRUS 3-5 TARGETS: CPT | Performed by: NURSE PRACTITIONER

## 2019-01-25 PROCEDURE — 71046 X-RAY EXAM CHEST 2 VIEWS: CPT

## 2019-01-25 PROCEDURE — 84100 ASSAY OF PHOSPHORUS: CPT | Performed by: INTERNAL MEDICINE

## 2019-01-25 PROCEDURE — 25000128 H RX IP 250 OP 636: Performed by: INTERNAL MEDICINE

## 2019-01-25 PROCEDURE — 25000132 ZZH RX MED GY IP 250 OP 250 PS 637: Mod: GY | Performed by: INTERNAL MEDICINE

## 2019-01-25 PROCEDURE — A9270 NON-COVERED ITEM OR SERVICE: HCPCS | Mod: GY | Performed by: INTERNAL MEDICINE

## 2019-01-25 PROCEDURE — 71045 X-RAY EXAM CHEST 1 VIEW: CPT

## 2019-01-25 RX ORDER — PIPERACILLIN SODIUM, TAZOBACTAM SODIUM 3; .375 G/15ML; G/15ML
3.38 INJECTION, POWDER, LYOPHILIZED, FOR SOLUTION INTRAVENOUS EVERY 6 HOURS
Status: DISCONTINUED | OUTPATIENT
Start: 2019-01-25 | End: 2019-01-26

## 2019-01-25 RX ADMIN — TRAMADOL HYDROCHLORIDE 50 MG: 50 TABLET, COATED ORAL at 17:41

## 2019-01-25 RX ADMIN — MICONAZOLE NITRATE: 20 CREAM TOPICAL at 19:48

## 2019-01-25 RX ADMIN — POTASSIUM CHLORIDE 20 MEQ: 750 TABLET, EXTENDED RELEASE ORAL at 08:52

## 2019-01-25 RX ADMIN — INSULIN ASPART 15 UNITS: 100 INJECTION, SOLUTION INTRAVENOUS; SUBCUTANEOUS at 17:41

## 2019-01-25 RX ADMIN — LACTULOSE 30 G: 20 SOLUTION ORAL at 16:34

## 2019-01-25 RX ADMIN — INSULIN ASPART 3 UNITS: 100 INJECTION, SOLUTION INTRAVENOUS; SUBCUTANEOUS at 13:01

## 2019-01-25 RX ADMIN — LACTULOSE 30 G: 20 SOLUTION ORAL at 13:00

## 2019-01-25 RX ADMIN — CARVEDILOL 12.5 MG: 6.25 TABLET, FILM COATED ORAL at 08:51

## 2019-01-25 RX ADMIN — DEXTRAN 70, AND HYPROMELLOSE 2910 1 DROP: 1; 3 SOLUTION/ DROPS OPHTHALMIC at 16:38

## 2019-01-25 RX ADMIN — MICONAZOLE NITRATE: 20 CREAM TOPICAL at 08:50

## 2019-01-25 RX ADMIN — PIPERACILLIN SODIUM,TAZOBACTAM SODIUM 3.38 G: 3; .375 INJECTION, POWDER, FOR SOLUTION INTRAVENOUS at 17:42

## 2019-01-25 RX ADMIN — DOCUSATE SODIUM 100 MG: 100 CAPSULE, LIQUID FILLED ORAL at 08:52

## 2019-01-25 RX ADMIN — DEXTRAN 70, AND HYPROMELLOSE 2910 1 DROP: 1; 3 SOLUTION/ DROPS OPHTHALMIC at 19:48

## 2019-01-25 RX ADMIN — LACTULOSE 30 G: 20 SOLUTION ORAL at 08:50

## 2019-01-25 RX ADMIN — CYANOCOBALAMIN TAB 1000 MCG 1000 MCG: 1000 TAB at 08:51

## 2019-01-25 RX ADMIN — TRAMADOL HYDROCHLORIDE 50 MG: 50 TABLET, COATED ORAL at 11:12

## 2019-01-25 RX ADMIN — TRAMADOL HYDROCHLORIDE 50 MG: 50 TABLET, COATED ORAL at 02:59

## 2019-01-25 RX ADMIN — DEXTRAN 70, AND HYPROMELLOSE 2910 1 DROP: 1; 3 SOLUTION/ DROPS OPHTHALMIC at 17:41

## 2019-01-25 RX ADMIN — OLANZAPINE 2.5 MG: 2.5 TABLET, FILM COATED ORAL at 22:31

## 2019-01-25 RX ADMIN — PIPERACILLIN SODIUM,TAZOBACTAM SODIUM 3.38 G: 3; .375 INJECTION, POWDER, FOR SOLUTION INTRAVENOUS at 22:31

## 2019-01-25 RX ADMIN — RIFAXIMIN 550 MG: 550 TABLET ORAL at 08:52

## 2019-01-25 RX ADMIN — OMEPRAZOLE 40 MG: 20 CAPSULE, DELAYED RELEASE ORAL at 08:52

## 2019-01-25 RX ADMIN — DEXTRAN 70, AND HYPROMELLOSE 2910 1 DROP: 1; 3 SOLUTION/ DROPS OPHTHALMIC at 11:12

## 2019-01-25 RX ADMIN — CARVEDILOL 12.5 MG: 6.25 TABLET, FILM COATED ORAL at 17:41

## 2019-01-25 RX ADMIN — DEXTRAN 70, AND HYPROMELLOSE 2910 1 DROP: 1; 3 SOLUTION/ DROPS OPHTHALMIC at 13:00

## 2019-01-25 RX ADMIN — INSULIN ASPART 4 UNITS: 100 INJECTION, SOLUTION INTRAVENOUS; SUBCUTANEOUS at 08:51

## 2019-01-25 RX ADMIN — VITAMIN D, TAB 1000IU (100/BT) 2000 UNITS: 25 TAB at 08:52

## 2019-01-25 RX ADMIN — DEXTRAN 70, AND HYPROMELLOSE 2910 1 DROP: 1; 3 SOLUTION/ DROPS OPHTHALMIC at 08:52

## 2019-01-25 RX ADMIN — RIFAXIMIN 550 MG: 550 TABLET ORAL at 19:48

## 2019-01-25 RX ADMIN — PIPERACILLIN SODIUM,TAZOBACTAM SODIUM 3.38 G: 3; .375 INJECTION, POWDER, FOR SOLUTION INTRAVENOUS at 11:12

## 2019-01-25 RX ADMIN — PIPERACILLIN SODIUM,TAZOBACTAM SODIUM 3.38 G: 3; .375 INJECTION, POWDER, FOR SOLUTION INTRAVENOUS at 05:35

## 2019-01-25 RX ADMIN — TAMSULOSIN HYDROCHLORIDE 0.4 MG: 0.4 CAPSULE ORAL at 08:52

## 2019-01-25 RX ADMIN — DEXTRAN 70, AND HYPROMELLOSE 2910 1 DROP: 1; 3 SOLUTION/ DROPS OPHTHALMIC at 22:31

## 2019-01-25 RX ADMIN — ACETAMINOPHEN 650 MG: 325 TABLET, FILM COATED ORAL at 05:00

## 2019-01-25 RX ADMIN — SODIUM CHLORIDE, POTASSIUM CHLORIDE, SODIUM LACTATE AND CALCIUM CHLORIDE 1000 ML: 600; 310; 30; 20 INJECTION, SOLUTION INTRAVENOUS at 04:58

## 2019-01-25 RX ADMIN — DEXTRAN 70, AND HYPROMELLOSE 2910 1 DROP: 1; 3 SOLUTION/ DROPS OPHTHALMIC at 05:54

## 2019-01-25 ASSESSMENT — ACTIVITIES OF DAILY LIVING (ADL): ADLS_ACUITY_SCORE: 14

## 2019-01-25 ASSESSMENT — PAIN DESCRIPTION - DESCRIPTORS
DESCRIPTORS: ACHING;CONSTANT

## 2019-01-25 NOTE — CONSULTS
INTERVENTIONAL RADIOLOGY CONSULT NOTE    History:   This is a 53 year old male who is one day s/p percutaneous ablation of segment 4A and 3 HCCs.  Procedure complicated by a small right pneumothorax which was monitored and found to be stable prior to discharge. The patient developed some abdominal pain and shortness of breath this evening and presented to the ER.      Labs:  Lab Results   Component Value Date    HGB 14.9 01/24/2019     Lab Results   Component Value Date    PLT 35 01/24/2019     Lab Results   Component Value Date    WBC 7.6 01/24/2019       Lab Results   Component Value Date    INR 1.24 01/22/2019       Lab Results   Component Value Date    PROTTOTAL 6.2 01/23/2019      Lab Results   Component Value Date    ALBUMIN 2.5 01/23/2019     Lab Results   Component Value Date    BILITOTAL 1.7 01/23/2019     No results found for: BILICONJ   Lab Results   Component Value Date    ALKPHOS 90 01/23/2019     Lab Results   Component Value Date     01/23/2019     Lab Results   Component Value Date    ALT 73 01/23/2019       Lab Results   Component Value Date    CR 1.07 01/23/2019     Lab Results   Component Value Date    BUN 21 01/23/2019       Assessment:   This is a 53 year old male who is having abdominal pain, shortness of breath and found to have a persistent right pneumothorax and free intraperitoneal air after a percutaneous liver ablation on 1/23/18.  Initially, it was thought that the patient has a new left sided pneumothorax, although it turned out that the patient's chest xray was flipped.  Luckily there were bilateral shoulder radiographs which were labeled correctly showing the discrepancy.    The patients abdominal pain is stable but uncontrolled since his procedure, overall not unexpected.  There are no suspicious signs to necessitate further evaluation such as CT at this point given a normal hgb and fairly benign exam.  The pneumoperitoneum is slightly more than expected but is most likely  post-procedural as well.      Plan:   1.  Plan will be to admit the patient to the Hem/onc service to monitor overnight.  2. Our recommendations are to get a chest xray in the morning as well as a hemoglobin.  Pain control as needed to get ahead of the abdominal pain.    3. We will discuss the patient as well tomorrow and get in touch with the service.    A/P discussed with Dr. Koehler and Dr. Tucker from the ER.      I, Dr Juan Koehler, discussed the case with the fellow and agree with the findings as documented in the assessment and plan.    Juan Koehler MD

## 2019-01-25 NOTE — PROGRESS NOTES
OBSERVATION PT    [id number]  OBS GOALS:  -diagnostic tests and consults completed and resulted  Next shift to assess  -vital signs normal or at patient baseline: pt sats mid 90's on 2L via n.c. TMax 99.1   -tolerating oral intake to maintain hydration Pt eating and drinking well  -adequate pain control on oral analgesics next shift to assess  -dyspnea improved and O2 sats greater than 88% on room air or prior home oxygen levels   pt sats mid 90's on 2L via n.c  -returns to baseline functional status   Alert and oriented x4, up w SBA to bathroom  -safe disposition plan has been identified   Nurse to notify provider when observation goals have been met and patient is ready for discharge.

## 2019-01-25 NOTE — PROGRESS NOTES
"S: Mr. Workman is a pleasant 52 y/o s/p TACE (1/22) and MWA (1/23) for multifocal HCC.  He presented yesterday with shoulder pain.  He felt that oxycodone made his overall pain worse given the induced bloating.       O:  Vital signs:                  Oxygen Delivery: 1 LPM      Estimated body mass index is 28.9 kg/m  as calculated from the following:    Height as of 1/24/19: 5' 9\".    Weight as of 1/24/19: 195 lb 11.2 oz.           Lab Results   Component Value Date     WBC 4.7 01/25/2019            Lab Results   Component Value Date     RBC 3.59 01/25/2019            Lab Results   Component Value Date     HGB 13.0 01/25/2019            Lab Results   Component Value Date     HCT 38.6 01/25/2019            Lab Results   Component Value Date      01/25/2019      Lab Results   Component Value Date     MCH 36.2 01/25/2019            Lab Results   Component Value Date     MCHC 33.7 01/25/2019            Lab Results   Component Value Date     RDW 14.0 01/25/2019            Lab Results   Component Value Date     PLT 26 01/25/2019      Last Comprehensive Metabolic Panel:        Sodium   Date Value Ref Range Status   01/25/2019 139 133 - 144 mmol/L Final            Potassium   Date Value Ref Range Status   01/25/2019 4.0 3.4 - 5.3 mmol/L Final            Chloride   Date Value Ref Range Status   01/25/2019 106 94 - 109 mmol/L Final            Carbon Dioxide   Date Value Ref Range Status   01/25/2019 26 20 - 32 mmol/L Final            Anion Gap   Date Value Ref Range Status   01/25/2019 6 3 - 14 mmol/L Final            Glucose   Date Value Ref Range Status   01/25/2019 196 (H) 70 - 99 mg/dL Final            Urea Nitrogen   Date Value Ref Range Status   01/25/2019 36 (H) 7 - 30 mg/dL Final            Creatinine   Date Value Ref Range Status   01/25/2019 1.31 (H) 0.66 - 1.25 mg/dL Final              GFR Estimate   Date Value Ref Range Status   01/25/2019 61 >60 mL/min/[1.73_m2] Final       Comment:       Non  " American GFR Calc  Starting 12/18/2018, serum creatinine based estimated GFR (eGFR) will be   calculated using the Chronic Kidney Disease Epidemiology Collaboration   (CKD-EPI) equation.               Calcium   Date Value Ref Range Status   01/25/2019 7.6 (L) 8.5 - 10.1 mg/dL Final            Bilirubin Total   Date Value Ref Range Status   01/24/2019 4.9 (H) 0.2 - 1.3 mg/dL Final      Alkaline Phosphatase   Date Value Ref Range Status   01/24/2019 91 40 - 150 U/L Final            ALT   Date Value Ref Range Status   01/24/2019 394 (H) 0 - 70 U/L Final              AST   Date Value Ref Range Status   01/24/2019 721 (HH) 0 - 45 U/L Final       Comment:       Critical Value called to and read back by  JAVED NESS RN 7D 2121 1/24/2019 BY AA            A/P: This is a pleasant 53 year old with pain following TACE (1/22) and ablation (1/23).  His shoulder pain is almost certainly referred from diaphragm irritation.  This likely also explains small right sided pleural effusion and his mild fever.       From IR standpoint he is ready for discharge after he has regiment which controls his pain.  Perhaps given his benefit of tylenol, ibuprofen should be considered in the short term despite mildly decreased kidney function.  We also discussed a narcotic regiment with a good bowel regiment as the bloating is what stopped him from being able to continue with this.

## 2019-01-25 NOTE — ED NOTES
[facility], [place]   ED Nurse to Floor Handoff     Frandy Workman is a 53 year old male who speaks English and lives with others,  in a home  They arrived in the ED by ambulance from home    ED Chief Complaint: Shoulder Pain (bilat (L worse than R)) and Shortness of Breath    ED Dx;   Final diagnoses:   Acute pain of both shoulders         Needed?: No    Allergies:   Allergies   Allergen Reactions     Codeine Other (See Comments)     Cannot take due to liver  Cannot tolerate oral narcotics   .  Past Medical Hx:   Past Medical History:   Diagnosis Date     Anemia 2013    Low blood plates current is 37     Arthritis      BPH (benign prostatic hyperplasia)      Cholelithiasis      Conductive hearing loss 8/16/2017    Have a lump on my right side of my face.  Had wax discharge     Depressive disorder 1986    Suffer effects throughout life     Gastroesophageal reflux disease 12/1/2014    Being treated with Prilosac     HCC (hepatocellular carcinoma) (H) 1/22/2019     Hepatitis 2014    Diagnosed with schrosis ESTRADA in 2014.  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       Baseline Mental status: WDL  Current Mental Status changes: at basesline    Infection present or suspected this encounter: no  Sepsis suspected: No  Isolation type: No active isolations     Activity level - Baseline/Home:  Independent  Activity Level - Current:   Independent    Bariatric equipment needed?: No    In the ED these meds were given:   Medications   ketorolac (TORADOL) injection 15 mg (15 mg Intravenous Given 1/24/19 1820)       Drips running?  No    Home pump  No    Current LDAs  Peripheral IV 01/24/19 Right Lower forearm (Active)   Site Assessment WDL 1/24/2019  5:42 PM   Number of days: 0       Incision/Surgical Site 01/23/19  Right;Upper;Medial Abdomen (Active)   Number of days: 1       Labs results:   Labs Ordered and Resulted from Time of ED Arrival Up to the Time of Departure from the ED   CBC WITH PLATELETS DIFFERENTIAL - Abnormal; Notable for the following components:       Result Value    RBC Count 4.04 (*)      (*)     MCH 36.9 (*)     Platelet Count 35 (*)     Absolute Lymphocytes 0.5 (*)     All other components within normal limits   COMPREHENSIVE METABOLIC PANEL   AMMONIA   TROPONIN I       Imaging Studies:   Recent Results (from the past 24 hour(s))   XR Chest 2 Views    Impression    Impression:   1. New small left-sided pneumothorax. The right-sided pneumothorax is  not well evaluated on this exam and is better seen on the dedicated  shoulder x-ray.  2. Pneumoperitoneum likely related to recent intra-abdominal  procedure.  3. Low lung volumes and streaky bibasilar opacities, likely  atelectasis.      [Result: Bilateral pneumothoraces and pneumoperitoneum]    Finding was identified on 1/24/2019 6:55 PM.     Dr. Willie Tucker was contacted by Dr. Piedra on 1/24/2019 7:00 PM  and verbalized understanding of the result.   XR Shoulder Left G/E 3 Views    Narrative    This is a preliminary resident report. Full report will follow.      Impression    Impression:   1. No fracture.  2. Small left apical pneumothorax, better appreciated on same-day  chest radiograph.  3. Pneumoperitoneum, status-post microwave ablation of HCC.   XR Shoulder Right G/E 3 Views   Result Value    Radiologist flags Pneumothorax and pneumoperitoneum (Urgent)    Narrative    This is a preliminary resident report. Full report will follow.      Impression    Impression:   1. No acute abnormality of the right shoulder.  2. Small right apical pneumothorax with right basilar atelectasis.  3. Pneumoperitoneum, status-post microwave ablation of HCC.    [Urgent Result: Pneumothorax and pneumoperitoneum]    Finding was identified on 1/24/2019 7:01 PM.  "    Dr. Tucker was contacted by Dr. Piedra at 1/24/2019 7:03 PM and  verbalized understanding of the urgent finding.        Recent vital signs:   /68   Pulse 96   Temp 100  F (37.8  C) (Oral)   Resp 18   Ht 1.753 m (5' 9\")   Wt 85.7 kg (189 lb)   SpO2 96%   BMI 27.91 kg/m      Cardiac Rhythm: Normal Sinus  Pt needs tele? No  Skin/wound Issues: None    Code Status: Full Code    Pain control: good    Nausea control: good    Abnormal labs/tests/findings requiring intervention: Pneumothorax    Family present during ED course? No   Family Comments/Social Situation comments: N/A    Tasks needing completion: None    Layton Sanchez, RN    [phone] [place] [facility]  [phone] [facility] ED      "

## 2019-01-25 NOTE — PLAN OF CARE
[id number]:  -diagnostic tests and consults completed and resulted: EKG normal. Repeat CXR overnight was negative for worsening pneumothorax - plan for repeat this AM. HBG 7.7 - not met    -vital signs normal or at patient baseline: Pt spiked fever, Tmax 102.9F. BPs trending up. Other VS stable. pt sats upper 90's on RA, but prefers to wear O2 for comfort due to chest pain and SOB. Triggered sepsis - LA 1.5. - not met    -tolerating oral intake to maintain hydration: Per RN shift report pt is eating and drinking well - did not consume anything overnight. Ordered breakfast.     -adequate pain control on oral analgesics: -dyspnea improved and O2 sats greater than 88% on room air or prior home oxygen levels: Pt still complaining of dyspnea and worsening pain in chest (worse on R side). Shoulder pain improving on L side - pt able to raise arm up with less discomfort. Sats upper 90s on RA - not met    -returns to baseline functional status:   A&Ox4, pt c/o feeling some generalized weakness due to lack of sleep - up w/ SBA to bathroom. - not met    -safe disposition plan has been identified   Nurse to notify provider when observation goals have been met and patient is ready for discharge.     Continue to monitor.        Pt woke up in the middle of the night with worsening chest pain and dyspnea. Tramadol given x1 with no relief. MD paged and came to see pt. Spiked fever and triggered sepsis - BC drawn and sent. Tylenol 650mg given x1 - temp down to 99.9. UA sent. Zosyn administered x1. LR bolus infusing.  at 0200 and 196 with AM labs. Pt stated pain is a 7/10 (from 8/10), so he is feeling some relief and ordered breakfast. Abd incision dressings changed. Continue to monitor w/ POC.

## 2019-01-25 NOTE — PLAN OF CARE
[id number]:  -diagnostic tests and consults completed and resulted: EKG normal. CXR shows persistent pneumothorax - plan for repeat in AM as well as hgb lab - not met    -vital signs normal or at patient baseline: VSS, afebrile. pt sats mid-upper 90's on 2L via NC. TMax 98.8 - not met    -tolerating oral intake to maintain hydration: Per RN shift report pt is eating and drinking well - did not consume anything overnight.    -adequate pain control on oral analgesics: -dyspnea improved and O2 sats greater than 88% on room air or prior home oxygen levels: Pt still complaining of dyspnea and 8/10 pain in chest and shoulders. Shoulder pain improving on L side - pt able to raise arm up with less discomfort. NC off when I came into the room at 0245 - sats 96-97 on RA.  - not met    -returns to baseline functional status:   A&Ox4, pt c/o feeling some generalized weakness due to lack of sleep - up w/ SBA to bathroom. - not met    -safe disposition plan has been identified   Nurse to notify provider when observation goals have been met and patient is ready for discharge.    Continue to monitor.

## 2019-01-25 NOTE — PROGRESS NOTES
"S: Mr. Workman is a pleasant 52 y/o s/p TACE (1/22) and MWA (1/23) for multifocal HCC.  He presented yesterday with shoulder pain.  He felt that oxycodone made his overall pain worse given the induced bloating.      O:  Vital signs:                  Oxygen Delivery: 1 LPM      Estimated body mass index is 28.9 kg/m  as calculated from the following:    Height as of 1/24/19: 5' 9\".    Weight as of 1/24/19: 195 lb 11.2 oz.    Lab Results   Component Value Date    WBC 4.7 01/25/2019     Lab Results   Component Value Date    RBC 3.59 01/25/2019     Lab Results   Component Value Date    HGB 13.0 01/25/2019     Lab Results   Component Value Date    HCT 38.6 01/25/2019     Lab Results   Component Value Date     01/25/2019     Lab Results   Component Value Date    MCH 36.2 01/25/2019     Lab Results   Component Value Date    MCHC 33.7 01/25/2019     Lab Results   Component Value Date    RDW 14.0 01/25/2019     Lab Results   Component Value Date    PLT 26 01/25/2019     Last Comprehensive Metabolic Panel:  Sodium   Date Value Ref Range Status   01/25/2019 139 133 - 144 mmol/L Final     Potassium   Date Value Ref Range Status   01/25/2019 4.0 3.4 - 5.3 mmol/L Final     Chloride   Date Value Ref Range Status   01/25/2019 106 94 - 109 mmol/L Final     Carbon Dioxide   Date Value Ref Range Status   01/25/2019 26 20 - 32 mmol/L Final     Anion Gap   Date Value Ref Range Status   01/25/2019 6 3 - 14 mmol/L Final     Glucose   Date Value Ref Range Status   01/25/2019 196 (H) 70 - 99 mg/dL Final     Urea Nitrogen   Date Value Ref Range Status   01/25/2019 36 (H) 7 - 30 mg/dL Final     Creatinine   Date Value Ref Range Status   01/25/2019 1.31 (H) 0.66 - 1.25 mg/dL Final     GFR Estimate   Date Value Ref Range Status   01/25/2019 61 >60 mL/min/[1.73_m2] Final     Comment:     Non  GFR Calc  Starting 12/18/2018, serum creatinine based estimated GFR (eGFR) will be   calculated using the Chronic Kidney Disease " Epidemiology Collaboration   (CKD-EPI) equation.       Calcium   Date Value Ref Range Status   01/25/2019 7.6 (L) 8.5 - 10.1 mg/dL Final     Bilirubin Total   Date Value Ref Range Status   01/24/2019 4.9 (H) 0.2 - 1.3 mg/dL Final     Alkaline Phosphatase   Date Value Ref Range Status   01/24/2019 91 40 - 150 U/L Final     ALT   Date Value Ref Range Status   01/24/2019 394 (H) 0 - 70 U/L Final     AST   Date Value Ref Range Status   01/24/2019 721 (HH) 0 - 45 U/L Final     Comment:     Critical Value called to and read back by  JAVED NESS RN 7D 2121 1/24/2019 BY AA         A/P: This is a pleasant 53 year old with pain following TACE (1/22) and ablation (1/23).  His shoulder pain is almost certainly referred from diaphragm irritation.  This likely also explains small right sided pleural effusion and his mild fever.      From IR standpoint he is ready for discharge after he has regiment which controls his pain.  Perhaps given his benefit of tylenol, ibuprofen should be considered in the short term despite mildly decreased kidney function.  We also discussed a narcotic regiment with a good bowel regiment as the bloating is what stopped him from being able to continue with this.

## 2019-01-25 NOTE — PROGRESS NOTES
Nursing Focus: Admission  D: Arrived at 2110 from ED accompanied by pt transport w/ no guest or visitors. Admitted for observation/pain mgmt, etc see obs goals. Complains of abdominal, chest, shoulder blade pain.      I: Admission process began.  Patient oriented to room, enviroment, call light.  MD notified of patient's arrival on unit.     A: Vital signs stable, afebrile.  Patient stable at this time.      P: Implement plan of care when available. Continue to monitor patient. Nursing interventions as appropriate. Notify MD with changes in pt status.

## 2019-01-25 NOTE — PLAN OF CARE
Observation Goals:   1- Goal-diagnostic tests and consults completed and resulted   - Goal Not met: Influenza testing collected. Waiting for result    2-vital signs normal or at patient baseline   -Not met: Febrile temp max 100.5. Tylenol given and temp came down to 98.9. Other VSS    3-tolerating oral intake to maintain hydration   -Goal met: Drinking and eating well. Eating all food in his meal tray    4-adequate pain control on oral analgesics   Goal met: pt report he is comfortable today with tramadol prn    5-dyspnea improved and O2 sats greater than 88% on room air or prior home oxygen levels   Goal Met -91 % RA, denies feeling SOB    6-returns to baseline functional status    -Goal met. Able to ambulate to bathroom independently    7-safe disposition plan has been identified

## 2019-01-25 NOTE — H&P
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    #HCC   Follows with Dr. Lees and Dr. Scott. He underwent routine screening ultrasound on 12/10/18, which showed a non-specific left hepatic lobe lesion. MRI abdomen on 12/23/19 showed 2 lesions, measuring 3.1 cm and 1.1 cm in hepatic segments 4b and 3, that were LIRADS 5. 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 CT chest and bone scan without metastatic disease. He was referred to IR for liver-directed therapy and started transplant evaluation. Established care with Dr. Lees on 1/14/19. He underwent TACE on 1/22/19 followed by CT guided Y90 liver ablation on 1/23.     # Pneumothorax  # Right shoulder pain  During the liver ablation procedure on 1/23, he was found to have a small right pneumothorax after hydrodissection. He had a post-procedure CXR showed stable right pneumothorax which was too small to be addressed by chest tube. As he was asymptomatic, he was given instructions to return to ED for any sign of worsening pneumothorax. CXR in ED showed right pneumothorax. Per IR recs he is to be observed overnight with CXR and Hgb in AM.  - O2 overnight for pneumothorax treatment  - recheck CXR in AM  - Toradol given in ED, will continue ibuprofen PRN,   - Has PTA oxycodone PRN but that made him confused and tired. Will try tramadol PRN.    #Pneumoperitoneum  Expected finding post TACE and Liver ablation.    #Cirrhosis 2/2 ESTRADA  Per pt, he was on the transplant list at [place] when he lived  in [place].  He follows with Dr. Banuelos here in the hepatology clinic now.    - Continue PTA Lactulose and Rifaximin    #DMII  - Hold PTA Farxiga and Tresiba  - 1 unit per 4g Carb scale and high SSI    #HTN  - Continue Carvedilol 12.5 mg BID    #BPH  - Continue Flomax    #OPHTHO  - Continue PTA eye drops    FEN  - IVF none  - CLD carb controlled    PPx  - VTE: mechanical only in case of procedure  - GI: PTA  PPI    Code status: Full code    Disposition: Discharge likely tomorrow pending stability or improvement in pneumothorax.  Obs status.    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 He underwent TACE on 1/22/19 followed by CT guided Y90 liver ablation on 1/23. During the liver ablation procedure on 1/23, he was found to have a small right pneumothorax after hydrodissection. He had a post-procedure CXR showed stable pneumothorax which was too small to be addressed by chest tube. As he was asymptomatic, he was given instructions to return to ED for any sign of worsening pneumothorax.     After the procedure he did have shoulder pain. He thought it would get better, however, he had a hard time sleeping last night because of the pain. He describes the pain as sharp, mostly felt in bilateral shoulders and in the bilateral anteriolateral parts of his chest. He also has noticed abdominal distension and he continues to have chronic pain in his lower abdomen. He also noticed a bit of shortness of breath. Because of these symptoms he presented to the ED.     In the ED he has been doing well. He received a dose of  toradol with some relief in the pain. After consutlation with IR it was determined that he should be observed overnight. Otherwise he denies nausea/vomiting/diarrhea or fevers. He was put on some O2 for desats to 88% later in the ED.     Past Medical History    I have reviewed this patient's medical history and updated it with pertinent information if needed.   Past Medical History:   Diagnosis Date     Anemia 2013    Low blood plates current is 37     Arthritis      BPH (benign prostatic hyperplasia)      Cholelithiasis      Conductive hearing loss 8/16/2017    Have a lump on my right side of my face.  Had wax discharge     Depressive disorder 1986    Suffer effects throughout life     Gastroesophageal reflux disease 12/1/2014    Being treated with Prilosac     HCC (hepatocellular carcinoma) (H) 1/22/2019     Hepatitis 2014    Diagnosed with schrosis ESTRADA in 2014.  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        Past Surgical History   I have reviewed this patient's surgical history and updated it with pertinent information if needed.  Past Surgical History:   Procedure Laterality Date     COLONOSCOPY      2015     ESOPHAGOSCOPY, GASTROSCOPY, DUODENOSCOPY (EGD), COMBINED N/A 11/17/2016    Procedure: COMBINED ESOPHAGOSCOPY, GASTROSCOPY, DUODENOSCOPY (EGD);  Surgeon: Santi Rosas MD;  Location:  GI     ESOPHAGOSCOPY, GASTROSCOPY, DUODENOSCOPY (EGD), COMBINED N/A 11/17/2017    Procedure: COMBINED ESOPHAGOSCOPY, GASTROSCOPY, DUODENOSCOPY (EGD);  EGD;  Surgeon: Santi Rosas MD;  Location:  GI     ESOPHAGOSCOPY, GASTROSCOPY, DUODENOSCOPY (EGD), COMBINED N/A 12/28/2018    Procedure: EGD;  Surgeon: Santi Rosas MD;  Location:  OR     HEAD & NECK SURGERY      12/2017 at [facility].      IMPLANT GOLD WEIGHT EYELID Right 11/16/2017     Procedure: IMPLANT WEIGHT EYELID;  Right Upper Eyelid Weight, right tarsal strip lower eyelid;  Surgeon: Milana Malave MD;  Location: [facility] OR     IR CHEMO EMBOLIZATION  2019     KNEE SURGERY Left      ORTHOPEDIC SURGERY       PAROTIDECTOMY, RADICAL NECK DISSECTION Right 2017    Procedure: PAROTIDECTOMY, RADICAL NECK DISSECTION;  Right Superfacial Parotidectomy , Facial nerve repair. with [facility] facial nerve monitor.;  Surgeon: Asiya Morgan MD;  Location: [facility] OR     PICC INSERTION Left 2017    4fr SL BioFlo PICC, 44cm in the L basilic vein w/ tip in the low SVC     VASCULAR SURGERY         Prior to Admission Medications   Prior to Admission Medications   Prescriptions Last Dose Informant Patient Reported? Taking?   ACCU-CHEK EDINSON PLUS test strip   No No   Sig: USE TO TEST BLOOD SUGARS FOUR TIMES DAILY OR AS DIRECTED   Artificial Tear Ointment (REFRESH LACRI-LUBE) OINT   No No   Sig: Apply 1 Application to eye At Bedtime   Artificial Tear Solution (SM ARTIFICIAL TEARS) SOLN   No No   Sig: Place 1 drop into the right eye every hour as needed Apply at least 4 times daily and as needed for dry eye   BD VIKTORIA U/F 32G X 4 MM insulin pen needle   No No   Sig: Use 5 per day   Cholecalciferol (VITAMIN D-3 PO)   Yes No   Sig: Take 2,000 Units by mouth every morning    IRON PO   Yes No   Sig: Take by mouth daily   Multiple Vitamin (THERAVITE PO)   Yes No   Sig: Take 1 tablet by mouth every morning    NOVOLOG FLEXPEN 100 UNIT/ML soln   No No   Sig: INJECT 1 UNIT PER 4 GRAMS OF CARBS AT MEALS AND SNACKS. CORRECTION SCALE OF 1 UNITS PER 25 OVER [address] DOSE 75 UNITERS PER DAY   OLANZapine (ZYPREXA) 2.5 MG tablet   No No   Sig: Take 1 tablet (2.5 mg) by mouth At Bedtime   Propylene Glycol-Glycerin (ARTIFICIAL TEARS) 1-0.3 % SOLN   No No   Sig: Apply 1 drop to eye every 2 hours (while awake)   UNABLE TO FIND   Yes No   Si times daily ULTRA MALE ENHANCEMENT POTENCY   XIFAXAN 550 MG TABS tablet   No No    Sig: TAKE 1 TABLET(550 MG) BY MOUTH TWICE DAILY   aspirin (ASPIRIN LOW DOSE) 81 MG EC tablet   No No   Sig: Take 1 tablet (81 mg) by mouth daily   blood glucose monitoring (ACCU-CHEK EDINSON PLUS) test strip   No No   Sig: Use to test blood sugar 4 times daily   blood glucose monitoring (ACCU-CHEK EDINSON PLUS) test strip   No No   Sig: USE TO TEST BLOOD SUGARS FOUR TIMES DAILY OR AS DIRECTED   blood glucose monitoring (ACCU-CHEK EDINSON PLUS) test strip   No No   Sig: USE TO TEST BLOOD SUGARS FOUR TIMES DAILY OR AS DIRECTED   blood glucose monitoring (ACCU-CHEK FASTCLIX) lancets   No No   Sig: Use to test blood sugar 4 times daily or as directed.  1 box = 102 lancets   capsaicin (ZOSTRIX) 0.025 % CREA cream   No No   Sig: To feet 2-3 times daily as needed.   carvedilol (COREG) 12.5 MG tablet   No No   Sig: TAKE 1 TABLET(12.5 MG) BY MOUTH TWICE DAILY WITH MEALS   cyanocobalamin (RA VITAMIN B-12 TR) 1000 MCG TBCR   Yes No   Sig: Take 1,000 mcg by mouth every morning    dapagliflozin (FARXIGA) 10 MG TABS tablet   No No   Sig: Take 1 tablet (10 mg) by mouth daily   econazole nitrate 1 % external cream   No No   Sig: APPLY TOPICALLY DAILY TO FEET AND TOENAILS   erythromycin (ROMYCIN) ophthalmic ointment   No No   Sig: Place into the right eye 3 times daily   insulin degludec (TRESIBA) 200 UNIT/ML pen   No No   Sig: Take 100 units daily.   lactulose (CHRONULAC) 10 GM/15ML solution   No No   Sig: Take 45 mLs (30 g) by mouth 4 times daily   omeprazole (PRILOSEC) 40 MG capsule   No No   Sig: Take 1 capsule (40 mg) by mouth daily   oxyCODONE (ROXICODONE) 5 MG tablet   No No   Sig: Take 1 tablet (5 mg) by mouth every 6 hours as needed for pain   potassium chloride SA (K-DUR/KLOR-CON M) 20 MEQ CR tablet   No No   Sig: Take 1 tablet (20 mEq) by mouth daily   pravastatin (PRAVACHOL) 20 MG tablet   No No   Sig: Take 1 tablet (20 mg) by mouth daily   rifaximin (XIFAXAN) 550 MG TABS tablet   No No   Sig: Take 1 tablet (550 mg) by mouth  2 times daily   sildenafil (REVATIO) 20 MG tablet   No No   Sig: Take 2-3  Tablets before activity by mouth   tadalafil (CIALIS) 20 MG tablet   No No   Sig: Take 1 tablet (20 mg) by mouth daily as needed   tamsulosin (FLOMAX) 0.4 MG capsule   No No   Sig: Take 1 capsule (0.4 mg) by mouth daily      Facility-Administered Medications Last Administration Doses Remaining   bevacizumab (AVASTIN) intravitreal inj 1.25 mg 10/3/2018  1:15 PM 9        Allergies   Allergies   Allergen Reactions     Codeine Other (See Comments)     Cannot take due to liver  Cannot tolerate oral narcotics       Social History   I have reviewed this patient's social history and updated it with pertinent information if needed. Frandy Workman  reports that  has never smoked. He quit smokeless tobacco use about 14 months ago. His smokeless tobacco use included chew. He reports that he does not drink alcohol or use drugs.    Family History   I have reviewed this patient's family history and updated it with pertinent information if needed.   Family History   Problem Relation Age of Onset     Prostate Cancer Maternal Grandfather      Substance Abuse Maternal Grandfather         Alcohol     Colon Cancer Father 60     Pancreatic Cancer Father 60     Prostate Cancer Father      Colorectal Cancer Father      Macular Degeneration Father      Cancer Father      Glaucoma Father      Colorectal Cancer Maternal Grandmother      Cancer Maternal Grandmother      Substance Abuse Maternal Grandmother         Alcohol     Colorectal Cancer Paternal Grandmother      Cancer Mother      Diabetes Mother          3/2016     Cerebrovascular Disease Mother         Passed away in Feb of this year, 80 years old.     Thyroid Disease Mother      Depression Mother      Asthma Sister         Had since birth     Thyroid Disease Sister      Depression Sister      Liver Disease No family hx of        Review of Systems   The 10 point Review of Systems is negative other  than noted in the HPI or here.     Physical Exam   Temp: 100  F (37.8  C) Temp src: Oral BP: 128/68 Pulse: 96 Heart Rate: 96 Resp: 18 SpO2: 96 % O2 Device: None (Room air)    Vital Signs with Ranges  Temp:  [100  F (37.8  C)] 100  F (37.8  C)  Pulse:  [96] 96  Heart Rate:  [96] 96  Resp:  [18] 18  BP: (128)/(68) 128/68  SpO2:  [96 %] 96 %  189 lbs 0 oz    Constitutional: Pleasant male seen laying in bed. No apparent distress, and appears stated age.  Eyes: Lids and lashes normal, sclera clear, conjunctiva normal.  ENT: Normocephalic,  oral pharynx with moist mucus membranes,   Respiratory: No increased work of breathing, good air exchange, clear to auscultation bilaterally, no crackles or wheezing.  Cardiovascular: Regular rate and rhythm, normal S1 and S2, and no murmur noted.  GI: No masses or scars. +BS. Soft. No tenderness on palpation.  Skin: No bruising or bleeding, normal skin color, texture, turgor, no redness, warmth, or swelling, no rashes, no lesions, no jaundice.  Extremities: There is no redness, warmth, or swelling of the joints. No lower extremity edema. No cyanosis. Pedal pulses 2+ bilaterally. R groin incision with small bruise, no bleeding or tender on palpation.  Neurologic: Awake, alert, oriented to name, place and time.    Vascular access: PIV    Data   ROUTINE IP LABS (Last four results)  BMP  Recent Labs   Lab 01/23/19  0557 01/22/19  1040    143   POTASSIUM 4.4 4.2   CHLORIDE 112* 110*   DEBORAH 7.9* 8.5   CO2 22 26   BUN 21 19   CR 1.07 1.06   * 154*     CBC  Recent Labs   Lab 01/24/19  1741 01/23/19  0557 01/22/19  1040   WBC 7.6 3.8* 3.8*   RBC 4.04* 3.57* 4.37*   HGB 14.9 12.5* 15.9   HCT 43.4 38.4* 46.9   * 108* 107*   MCH 36.9* 35.0* 36.4*   MCHC 34.3 32.6 33.9   RDW 14.4 14.6 14.4   PLT 35* 29* 38*     INR  Recent Labs   Lab 01/22/19  1040   INR 1.24*

## 2019-01-25 NOTE — PROGRESS NOTES
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    #HCC   Follows with Dr. Lees and Dr. Scott. He underwent routine screening ultrasound on 12/10/18, which showed a non-specific left hepatic lobe lesion. MRI abdomen on 12/23/19 showed 2 lesions, measuring 3.1 cm and 1.1 cm in hepatic segments 4b and 3, that were LIRADS 5. 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 CT chest and bone scan without metastatic disease. He was referred to IR for liver-directed therapy and started transplant evaluation. Established care with Dr. Lees on 1/14/19. He underwent TACE on 1/22/19 followed by CT guided Y90 liver ablation on 1/23.     # Pneumothorax  # Right shoulder pain  During the liver ablation procedure on 1/23, he was found to have a small right pneumothorax after hydrodissection. He had a post-procedure CXR showed stable right pneumothorax which was too small to be addressed by chest tube. As he was asymptomatic, he was given instructions to return to ED for any sign of worsening pneumothorax. CXR in ED showed right pneumothorax. Per IR recs he is to be observed overnight with CXR and Hgb in AM. Running low grade temps here but no coughing. SOB with increased activity. RSV obtained to r/o influenza.   - O2 overnight for pneumothorax treatment. Oxygen saturations have been in the 91-92% range on 2L NC  - Toradol given in ED, will continue ibuprofen PRN,   - Has PTA Oxycodone PRN but that made him confused and tired.   - On Trial of Tramadol  PRN.    #Pneumoperitoneum  Expected finding post TACE and Liver ablation.    #Cirrhosis 2/2 ESTRADA  Per pt, he was on the transplant list at [facility] when he lived in [place].  He follows with Dr. Banuelos in the hepatology clinic now.   Is planning on getting on the transplant list here.    - Continue PTA Lactulose and Rifaximin    #DMII  - Hold PTA Farxiga and Tresiba  - 1 unit per 4g Carb scale and high SSI    #HTN  - Continue Carvedilol 12.5 mg BID    #BPH  - Continue Flomax    FEN  - IVF none  - CLD carb controlled    PPx  - VTE: mechanical only in case of procedure  - GI: PTA  PPI    Code status: Full code    Disposition: Discharge likely tomorrow pending stability or improvement in pneumothorax.  Obs status.      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 He underwent TACE on 1/22/19 followed by CT guided Y90 liver ablation on 1/23. During the liver ablation procedure on 1/23, he was found to have a small right pneumothorax after hydrodissection. He had a post-procedure CXR showed stable pneumothorax which was too small to be addressed by chest tube. As he was asymptomatic, he was given instructions to return to ED for any sign of worsening pneumothorax.     After the procedure he did have shoulder pain. He thought it would get better, however, he had a hard time sleeping last night because of the pain. He describes the pain as sharp, mostly felt in bilateral shoulders and in the bilateral anteriolateral parts of his chest. He also has noticed abdominal distension and he continues to have chronic pain in his  "lower abdomen. He also noticed a bit of shortness of breath. Because of these symptoms he presented to the ED. In the ED he has been doing well. He received a dose of Toradol with some relief in the pain. After consutlation with IR it was determined that he should be observed overnight. Otherwise he denies nausea/vomiting/diarrhea or fevers.   He was put on some O2 for desats to 88% later in the ED.     Interval Visit:   Had a \"rough night\" due to the pain. Pain is low (2/10) when he is lying down but increased with activity. Pain is located in his bilateral shoulders. Hurts to deep breath but is otherwise improving. States pain was severe yesterday. He denies any URI symptoms. However, pt has been running low grade temps. Will obtain RSV swab of the nasopharyngeal to r/o influenza. Denies nausea or vomiting. Seen by IR fellow today as well.     Past Medical History    I have reviewed this patient's medical history and updated it with pertinent information if needed.   Past Medical History:   Diagnosis Date     Anemia 2013    Low blood plates current is 37     Arthritis      BPH (benign prostatic hyperplasia)      Cholelithiasis      Conductive hearing loss 8/16/2017    Have a lump on my right side of my face.  Had wax discharge     Depressive disorder 1986    Suffer effects throughout life     Gastroesophageal reflux disease 12/1/2014    Being treated with Prilosac     HCC (hepatocellular carcinoma) (H) 1/22/2019     Hepatitis 2014    Diagnosed with schrosis ESTRADA in 2014.  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        Past Surgical History   I have reviewed this patient's surgical history and updated it with pertinent information if needed.  Past Surgical History:   Procedure Laterality Date     COLONOSCOPY      2015     ESOPHAGOSCOPY, GASTROSCOPY, " DUODENOSCOPY (EGD), COMBINED N/A 11/17/2016    Procedure: COMBINED ESOPHAGOSCOPY, GASTROSCOPY, DUODENOSCOPY (EGD);  Surgeon: Santi Rosas MD;  Location: [facility]     ESOPHAGOSCOPY, GASTROSCOPY, DUODENOSCOPY (EGD), COMBINED N/A 11/17/2017    Procedure: COMBINED ESOPHAGOSCOPY, GASTROSCOPY, DUODENOSCOPY (EGD);  EGD;  Surgeon: Santi Rosas MD;  Location: [facility]     ESOPHAGOSCOPY, GASTROSCOPY, DUODENOSCOPY (EGD), COMBINED N/A 12/28/2018    Procedure: EGD;  Surgeon: Santi Rosas MD;  Location:  OR     HEAD & NECK SURGERY      12/2017 at [facility].      IMPLANT GOLD WEIGHT EYELID Right 11/16/2017    Procedure: IMPLANT WEIGHT EYELID;  Right Upper Eyelid Weight, right tarsal strip lower eyelid;  Surgeon: Milana Malave MD;  Location:  OR     IR CHEMO EMBOLIZATION  1/22/2019     KNEE SURGERY Left      ORTHOPEDIC SURGERY       PAROTIDECTOMY, RADICAL NECK DISSECTION Right 11/2/2017    Procedure: PAROTIDECTOMY, RADICAL NECK DISSECTION;  Right Superfacial Parotidectomy , Facial nerve repair. with [facility] facial nerve monitor.;  Surgeon: Asiya Morgan MD;  Location: [facility] OR     PICC INSERTION Left 11/06/2017    4fr SL BioFlo PICC, 44cm in the L basilic vein w/ tip in the low SVC     VASCULAR SURGERY         Prior to Admission Medications   Prior to Admission Medications   Prescriptions Last Dose Informant Patient Reported? Taking?   ACCU-CHEK EDINSON PLUS test strip   No No   Sig: USE TO TEST BLOOD SUGARS FOUR TIMES DAILY OR AS DIRECTED   Artificial Tear Ointment (REFRESH LACRI-LUBE) OINT 1/23/2019 at PM  No Yes   Sig: Apply 1 Application to eye At Bedtime   Artificial Tear Solution (SM ARTIFICIAL TEARS) SOLN 1/24/2019 at AM  No No   Sig: Place 1 drop into the right eye every hour as needed Apply at least 4 times daily and as needed for dry eye   BD VIKTORIA U/F 32G X 4 MM insulin pen needle   No No   Sig: Use 5 per day   Cholecalciferol (VITAMIN D-3 PO) Past Week at Unknown time  Yes Yes   Sig: Take  2,000 Units by mouth every morning    IRON PO 2019 at AM  Yes Yes   Sig: Take by mouth daily   Multiple Vitamin (THERAVITE PO) 2019 at AM  Yes Yes   Sig: Take 1 tablet by mouth every morning    NOVOLOG FLEXPEN 100 UNIT/ML soln   No No   Sig: INJECT 1 UNIT PER 4 GRAMS OF CARBS AT MEALS AND SNACKS. CORRECTION SCALE OF 1 UNITS PER 25 OVER [address] DOSE 75 UNITERS PER DAY   OLANZapine (ZYPREXA) 2.5 MG tablet Past Week at Unknown time  No Yes   Sig: Take 1 tablet (2.5 mg) by mouth At Bedtime   Propylene Glycol-Glycerin (ARTIFICIAL TEARS) 1-0.3 % SOLN 2019 at Unknown time  No Yes   Sig: Apply 1 drop to eye every 2 hours (while awake)   UNABLE TO FIND   Yes No   Si times daily ULTRA MALE ENHANCEMENT POTENCY   XIFAXAN 550 MG TABS tablet 2019 at AM  No Yes   Sig: TAKE 1 TABLET(550 MG) BY MOUTH TWICE DAILY   aspirin (ASPIRIN LOW DOSE) 81 MG EC tablet Past Month at Unknown time  No Yes   Sig: Take 1 tablet (81 mg) by mouth daily   blood glucose monitoring (ACCU-CHEK EDINSON PLUS) test strip   No No   Sig: Use to test blood sugar 4 times daily   blood glucose monitoring (ACCU-CHEK EDINSON PLUS) test strip   No No   Sig: USE TO TEST BLOOD SUGARS FOUR TIMES DAILY OR AS DIRECTED   blood glucose monitoring (ACCU-CHEK EDINSON PLUS) test strip   No No   Sig: USE TO TEST BLOOD SUGARS FOUR TIMES DAILY OR AS DIRECTED   blood glucose monitoring (ACCU-CHEK FASTCLIX) lancets   No No   Sig: Use to test blood sugar 4 times daily or as directed.  1 box = 102 lancets   capsaicin (ZOSTRIX) 0.025 % CREA cream Past Week at Unknown time  No Yes   Sig: To feet 2-3 times daily as needed.   carvedilol (COREG) 12.5 MG tablet 2019 at AM  No Yes   Sig: TAKE 1 TABLET(12.5 MG) BY MOUTH TWICE DAILY WITH MEALS   cyanocobalamin (RA VITAMIN B-12 TR) 1000 MCG TBCR 2019 at AM  Yes Yes   Sig: Take 1,000 mcg by mouth every morning    dapagliflozin (FARXIGA) 10 MG TABS tablet 2019 at AM  No Yes   Sig: Take 1 tablet (10 mg) by mouth  daily   econazole nitrate 1 % external cream 1/23/2019 at Unknown time  No Yes   Sig: APPLY TOPICALLY DAILY TO FEET AND TOENAILS   erythromycin (ROMYCIN) ophthalmic ointment 1/23/2019 at Unknown time  No Yes   Sig: Place into the right eye 3 times daily   insulin degludec (TRESIBA) 200 UNIT/ML pen 1/24/2019 at AM  No Yes   Sig: Take 100 units daily.   lactulose (CHRONULAC) 10 GM/15ML solution 1/24/2019 at AM  No Yes   Sig: Take 45 mLs (30 g) by mouth 4 times daily   omeprazole (PRILOSEC) 40 MG capsule 1/24/2019 at AM  No Yes   Sig: Take 1 capsule (40 mg) by mouth daily   oxyCODONE (ROXICODONE) 5 MG tablet   No Yes   Sig: Take 1 tablet (5 mg) by mouth every 6 hours as needed for pain   potassium chloride SA (K-DUR/KLOR-CON M) 20 MEQ CR tablet 1/24/2019 at AM  No Yes   Sig: Take 1 tablet (20 mEq) by mouth daily   pravastatin (PRAVACHOL) 20 MG tablet Past Week at Unknown time  No Yes   Sig: Take 1 tablet (20 mg) by mouth daily   rifaximin (XIFAXAN) 550 MG TABS tablet 1/24/2019 at AM  No Yes   Sig: Take 1 tablet (550 mg) by mouth 2 times daily   sildenafil (REVATIO) 20 MG tablet More than a month at Unknown time  No No   Sig: Take 2-3  Tablets before activity by mouth   tadalafil (CIALIS) 20 MG tablet More than a month at Unknown time  No No   Sig: Take 1 tablet (20 mg) by mouth daily as needed   tamsulosin (FLOMAX) 0.4 MG capsule Past Week at Unknown time  No Yes   Sig: Take 1 capsule (0.4 mg) by mouth daily      Facility-Administered Medications Last Administration Doses Remaining   bevacizumab (AVASTIN) intravitreal inj 1.25 mg 10/3/2018  1:15 PM 9        Allergies   Allergies   Allergen Reactions     Codeine Other (See Comments)     Cannot take due to liver  Cannot tolerate oral narcotics       Social History   I have reviewed this patient's social history and updated it with pertinent information if needed. Frandy Workman  reports that  has never smoked. He quit smokeless tobacco use about 14 months ago. His  smokeless tobacco use included chew. He reports that he does not drink alcohol or use drugs.    Family History   I have reviewed this patient's family history and updated it with pertinent information if needed.   Family History   Problem Relation Age of Onset     Prostate Cancer Maternal Grandfather      Substance Abuse Maternal Grandfather         Alcohol     Colon Cancer Father 60     Pancreatic Cancer Father 60     Prostate Cancer Father      Colorectal Cancer Father      Macular Degeneration Father      Cancer Father      Glaucoma Father      Colorectal Cancer Maternal Grandmother      Cancer Maternal Grandmother      Substance Abuse Maternal Grandmother         Alcohol     Colorectal Cancer Paternal Grandmother      Cancer Mother      Diabetes Mother          3/2016     Cerebrovascular Disease Mother         Passed away in Feb of this year, 80 years old.     Thyroid Disease Mother      Depression Mother      Asthma Sister         Had since birth     Thyroid Disease Sister      Depression Sister      Liver Disease No family hx of        Review of Systems   The 10 point Review of Systems is negative other than noted in the HPI or here.     Physical Exam   Temp: 98.9  F (37.2  C) Temp src: Oral BP: 104/54 Pulse: 90 Heart Rate: 82 Resp: 20 SpO2: 91 % O2 Device: None (Room air) Oxygen Delivery: 2 LPM  Vital Signs with Ranges  Temp:  [98.8  F (37.1  C)-102.5  F (39.2  C)] 98.9  F (37.2  C)  Pulse:  [87-96] 90  Heart Rate:  [82-96] 82  Resp:  [18-29] 20  BP: ()/(53-69) 104/54  SpO2:  [91 %-98 %] 91 %  195 lbs 11.2 oz    Constitutional: Pleasant male seen laying in bed. In NAD. Appears stated age.  Eyes: Lids and lashes normal, sclera clear, conjunctiva normal.  ENT: Normocephalic,  oral pharynx with moist mucus membranes,   Respiratory: No increased work of breathing, good air exchange, clear to auscultation bilaterally, no crackles or wheezing.  Cardiovascular: Regular rate and rhythm, normal S1 and  S2, and no murmur noted.  GI: No masses or scars. +BS. Soft. No tenderness on palpation.  Skin: No bruising or bleeding, normal skin color, texture, turgor, no redness, warmth, or swelling, no rashes, no lesions, no jaundice.  Extremities: There is no redness, warmth, or swelling of the joints. No lower extremity edema. No cyanosis. Pedal pulses 2+ bilaterally. R groin incision with small bruise, no bleeding or tender on palpation.  Neurologic: Awake, alert, oriented to name, place and time.    Vascular access: PIV    Data   ROUTINE IP LABS (Last four results)  BMP  Recent Labs   Lab 01/25/19 0445 01/24/19  1741 01/23/19  0557 01/22/19  1040    139 143 143   POTASSIUM 4.0 4.5 4.4 4.2   CHLORIDE 106 106 112* 110*   DEBORAH 7.6* 8.3* 7.9* 8.5   CO2 26 22 22 26   BUN 36* 31* 21 19   CR 1.31* 1.18 1.07 1.06   * 141* 135* 154*     CBC  Recent Labs   Lab 01/25/19 0445 01/24/19  1741 01/23/19  0557 01/22/19  1040   WBC 4.7 7.6 3.8* 3.8*   RBC 3.59* 4.04* 3.57* 4.37*   HGB 13.0* 14.9 12.5* 15.9   HCT 38.6* 43.4 38.4* 46.9   * 107* 108* 107*   MCH 36.2* 36.9* 35.0* 36.4*   MCHC 33.7 34.3 32.6 33.9   RDW 14.0 14.4 14.6 14.4   PLT 26* 35* 29* 38*     INR  Recent Labs   Lab 01/22/19  1040   INR 1.24*

## 2019-01-25 NOTE — PROGRESS NOTES
"CROSSCOVER PROGRESS NOTE    Called to evaluate patient due to worsening R sided chest pain and fever of 102.5 triggering sepsis protocol. Pt says pain has been worsening over last 30 minutes. Also complains of dysuria with very \"yellow urine\"    /64 (BP Location: Left arm)   Pulse 90   Temp 102.5  F (39.2  C) (Oral)   Resp 22   Ht 1.753 m (5' 9\")   Wt 88.8 kg (195 lb 11.2 oz)   SpO2 98%   BMI 28.90 kg/m    Pleasant, sitting up in bed, NAD  Mildly decreased breath sounds on R side  Abd soft, NT, ND    A/P  - Repeat STAT XR at bedside negative for worsening pneumothorax  - Will start Zosyn with new fever, tachycardia and recent procedure  - Obtain blood cultures x 2 and UA w/ reflex to culture  - LR 1000cc over 2 hours  - APAP 650mg    DO Angella Kingsley    "

## 2019-01-26 VITALS
OXYGEN SATURATION: 92 % | WEIGHT: 195.7 LBS | RESPIRATION RATE: 21 BRPM | BODY MASS INDEX: 28.99 KG/M2 | TEMPERATURE: 99.9 F | HEIGHT: 69 IN | SYSTOLIC BLOOD PRESSURE: 116 MMHG | DIASTOLIC BLOOD PRESSURE: 59 MMHG | HEART RATE: 90 BPM

## 2019-01-26 LAB
ALBUMIN SERPL-MCNC: 2.2 G/DL (ref 3.4–5)
ALP SERPL-CCNC: 69 U/L (ref 40–150)
ALT SERPL W P-5'-P-CCNC: 214 U/L (ref 0–70)
ANION GAP SERPL CALCULATED.3IONS-SCNC: 7 MMOL/L (ref 3–14)
AST SERPL W P-5'-P-CCNC: 221 U/L (ref 0–45)
BASOPHILS # BLD AUTO: 0 10E9/L (ref 0–0.2)
BASOPHILS NFR BLD AUTO: 0.3 %
BILIRUB SERPL-MCNC: 3.2 MG/DL (ref 0.2–1.3)
BUN SERPL-MCNC: 33 MG/DL (ref 7–30)
CALCIUM SERPL-MCNC: 7.1 MG/DL (ref 8.5–10.1)
CHLORIDE SERPL-SCNC: 108 MMOL/L (ref 94–109)
CO2 SERPL-SCNC: 23 MMOL/L (ref 20–32)
CREAT SERPL-MCNC: 1.26 MG/DL (ref 0.66–1.25)
DIFFERENTIAL METHOD BLD: ABNORMAL
EOSINOPHIL # BLD AUTO: 0.2 10E9/L (ref 0–0.7)
EOSINOPHIL NFR BLD AUTO: 5.9 %
ERYTHROCYTE [DISTWIDTH] IN BLOOD BY AUTOMATED COUNT: 13.9 % (ref 10–15)
GFR SERPL CREATININE-BSD FRML MDRD: 64 ML/MIN/{1.73_M2}
GLUCOSE BLDC GLUCOMTR-MCNC: 132 MG/DL (ref 70–99)
GLUCOSE SERPL-MCNC: 113 MG/DL (ref 70–99)
HCT VFR BLD AUTO: 33.3 % (ref 40–53)
HGB BLD-MCNC: 11.3 G/DL (ref 13.3–17.7)
IMM GRANULOCYTES # BLD: 0 10E9/L (ref 0–0.4)
IMM GRANULOCYTES NFR BLD: 0.5 %
LYMPHOCYTES # BLD AUTO: 0.5 10E9/L (ref 0.8–5.3)
LYMPHOCYTES NFR BLD AUTO: 12 %
MCH RBC QN AUTO: 37.2 PG (ref 26.5–33)
MCHC RBC AUTO-ENTMCNC: 33.9 G/DL (ref 31.5–36.5)
MCV RBC AUTO: 110 FL (ref 78–100)
MONOCYTES # BLD AUTO: 0.2 10E9/L (ref 0–1.3)
MONOCYTES NFR BLD AUTO: 5.6 %
NEUTROPHILS # BLD AUTO: 2.9 10E9/L (ref 1.6–8.3)
NEUTROPHILS NFR BLD AUTO: 75.7 %
NRBC # BLD AUTO: 0 10*3/UL
NRBC BLD AUTO-RTO: 0 /100
PHOSPHATE SERPL-MCNC: 3.4 MG/DL (ref 2.5–4.5)
PLATELET # BLD AUTO: 20 10E9/L (ref 150–450)
POTASSIUM SERPL-SCNC: 4.2 MMOL/L (ref 3.4–5.3)
PROT SERPL-MCNC: 5.7 G/DL (ref 6.8–8.8)
RBC # BLD AUTO: 3.04 10E12/L (ref 4.4–5.9)
SODIUM SERPL-SCNC: 138 MMOL/L (ref 133–144)
WBC # BLD AUTO: 3.8 10E9/L (ref 4–11)

## 2019-01-26 PROCEDURE — A9270 NON-COVERED ITEM OR SERVICE: HCPCS | Mod: GY | Performed by: NURSE PRACTITIONER

## 2019-01-26 PROCEDURE — 99238 HOSP IP/OBS DSCHRG MGMT 30/<: CPT | Performed by: INTERNAL MEDICINE

## 2019-01-26 PROCEDURE — 25000128 H RX IP 250 OP 636: Performed by: INTERNAL MEDICINE

## 2019-01-26 PROCEDURE — 80053 COMPREHEN METABOLIC PANEL: CPT | Performed by: NURSE PRACTITIONER

## 2019-01-26 PROCEDURE — 25000125 ZZHC RX 250: Performed by: INTERNAL MEDICINE

## 2019-01-26 PROCEDURE — A9270 NON-COVERED ITEM OR SERVICE: HCPCS | Mod: GY | Performed by: INTERNAL MEDICINE

## 2019-01-26 PROCEDURE — 00000146 ZZHCL STATISTIC GLUCOSE BY METER IP

## 2019-01-26 PROCEDURE — 85025 COMPLETE CBC W/AUTO DIFF WBC: CPT | Performed by: NURSE PRACTITIONER

## 2019-01-26 PROCEDURE — 36415 COLL VENOUS BLD VENIPUNCTURE: CPT | Performed by: INTERNAL MEDICINE

## 2019-01-26 PROCEDURE — 84100 ASSAY OF PHOSPHORUS: CPT | Performed by: INTERNAL MEDICINE

## 2019-01-26 PROCEDURE — 25000132 ZZH RX MED GY IP 250 OP 250 PS 637: Mod: GY | Performed by: INTERNAL MEDICINE

## 2019-01-26 PROCEDURE — 36415 COLL VENOUS BLD VENIPUNCTURE: CPT | Performed by: NURSE PRACTITIONER

## 2019-01-26 PROCEDURE — 25000132 ZZH RX MED GY IP 250 OP 250 PS 637: Mod: GY | Performed by: NURSE PRACTITIONER

## 2019-01-26 RX ORDER — AZITHROMYCIN 250 MG/1
500 TABLET, FILM COATED ORAL ONCE
Status: COMPLETED | OUTPATIENT
Start: 2019-01-26 | End: 2019-01-26

## 2019-01-26 RX ORDER — IBUPROFEN 600 MG/1
600 TABLET, FILM COATED ORAL EVERY 6 HOURS PRN
COMMUNITY
Start: 2019-01-26 | End: 2019-03-01

## 2019-01-26 RX ORDER — TRAMADOL HYDROCHLORIDE 50 MG/1
50 TABLET ORAL EVERY 6 HOURS PRN
Qty: 20 TABLET | Refills: 0 | Status: SHIPPED | OUTPATIENT
Start: 2019-01-26 | End: 2019-03-01

## 2019-01-26 RX ORDER — ACETAMINOPHEN 325 MG/1
650 TABLET ORAL EVERY 4 HOURS PRN
COMMUNITY
Start: 2019-01-26 | End: 2019-03-01

## 2019-01-26 RX ORDER — AZITHROMYCIN 250 MG/1
250 TABLET, FILM COATED ORAL DAILY
Qty: 4 TABLET | Refills: 0 | Status: SHIPPED | OUTPATIENT
Start: 2019-01-27 | End: 2019-03-01

## 2019-01-26 RX ORDER — AZITHROMYCIN 250 MG/1
250 TABLET, FILM COATED ORAL DAILY
Status: DISCONTINUED | OUTPATIENT
Start: 2019-01-27 | End: 2019-01-26 | Stop reason: HOSPADM

## 2019-01-26 RX ADMIN — AZITHROMYCIN 500 MG: 250 TABLET, FILM COATED ORAL at 09:57

## 2019-01-26 RX ADMIN — LACTULOSE 30 G: 20 SOLUTION ORAL at 08:03

## 2019-01-26 RX ADMIN — PIPERACILLIN SODIUM,TAZOBACTAM SODIUM 3.38 G: 3; .375 INJECTION, POWDER, FOR SOLUTION INTRAVENOUS at 05:55

## 2019-01-26 RX ADMIN — DEXTRAN 70, AND HYPROMELLOSE 2910 1 DROP: 1; 3 SOLUTION/ DROPS OPHTHALMIC at 05:55

## 2019-01-26 RX ADMIN — POTASSIUM CHLORIDE 20 MEQ: 750 TABLET, EXTENDED RELEASE ORAL at 08:03

## 2019-01-26 RX ADMIN — DEXTRAN 70, AND HYPROMELLOSE 2910 1 DROP: 1; 3 SOLUTION/ DROPS OPHTHALMIC at 08:04

## 2019-01-26 RX ADMIN — VITAMIN D, TAB 1000IU (100/BT) 2000 UNITS: 25 TAB at 08:03

## 2019-01-26 RX ADMIN — OMEPRAZOLE 40 MG: 20 CAPSULE, DELAYED RELEASE ORAL at 08:03

## 2019-01-26 RX ADMIN — POTASSIUM PHOSPHATE, MONOBASIC AND POTASSIUM PHOSPHATE, DIBASIC 15 MMOL: 224; 236 INJECTION, SOLUTION INTRAVENOUS at 00:35

## 2019-01-26 RX ADMIN — DEXTRAN 70, AND HYPROMELLOSE 2910 1 DROP: 1; 3 SOLUTION/ DROPS OPHTHALMIC at 09:59

## 2019-01-26 RX ADMIN — CYANOCOBALAMIN TAB 1000 MCG 1000 MCG: 1000 TAB at 08:03

## 2019-01-26 RX ADMIN — CARVEDILOL 12.5 MG: 6.25 TABLET, FILM COATED ORAL at 08:03

## 2019-01-26 RX ADMIN — RIFAXIMIN 550 MG: 550 TABLET ORAL at 08:03

## 2019-01-26 RX ADMIN — TRAMADOL HYDROCHLORIDE 50 MG: 50 TABLET, COATED ORAL at 09:02

## 2019-01-26 RX ADMIN — TAMSULOSIN HYDROCHLORIDE 0.4 MG: 0.4 CAPSULE ORAL at 08:03

## 2019-01-26 ASSESSMENT — ACTIVITIES OF DAILY LIVING (ADL)
ADLS_ACUITY_SCORE: 14

## 2019-01-26 ASSESSMENT — PAIN DESCRIPTION - DESCRIPTORS: DESCRIPTORS: ACHING;CONSTANT

## 2019-01-26 NOTE — PLAN OF CARE
"Miller said he continues to have pain in shoulders bilat and lower back.  Did obtain relief with Tramadol.  Pt questioning if it is ok for him to go home.  MD spoke with pt and both decided he was stable to go home today. Eating well and Bg covered.  C/O cough and \"chest rattle\"  Zithromax was started - teaching done regarding Zithromax.  Discharge medications and instructions reviewed with pt - no questions.  Clinic phone number provided.   "

## 2019-01-26 NOTE — PLAN OF CARE
Tmax 100F, OVSS. Pt no longer observation, now inpatient.  RSV/influenza negative. C/o pain at L flank and pain when taking deep breaths, tramadol given x1 with relief. Denies SOB, n/v. Voiding well, BM x2 this shift. Continue to monitor.

## 2019-01-26 NOTE — PROGRESS NOTES
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     #HCC   Follows with Dr. Lees and Dr. Scott. He underwent routine screening ultrasound on 12/10/18, which showed a non-specific left hepatic lobe lesion. MRI abdomen on 12/23/19 showed 2 lesions, measuring 3.1 cm and 1.1 cm in hepatic segments 4b and 3, that were LIRADS 5. 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 CT chest and bone scan without metastatic disease. He was referred to IR for liver-directed therapy and started transplant evaluation.   - Established care with Dr. Lees on 1/14/19. He underwent TACE on 1/22/19 followed by CT guided microwave ablation on 1/23.      # Pneumothorax  # Right shoulder pain  During the liver ablation procedure on 1/23, he was found to have a small right pneumothorax after hydrodissection. He had a post-procedure CXR showed stable right pneumothorax which was too small to be addressed by chest tube. As he was asymptomatic, he was given instructions to return to ED for any sign of worsening pneumothorax. CXR in ED showed right pneumothorax. Per IR recs he is to be observed overnight with CXR and Hgb in AM. Running low grade temps here but no coughing. SOB with increased activity. RSV obtained to r/o influenza.   - O2 overnight for pneumothorax treatment. Oxygen saturations have been in the 91-92% range on 2L NC  - Toradol given in ED, will continue ibuprofen PRN,   - Has PTA Oxycodone PRN but that made him confused and tired.   - On Trial of Tramadol PRN.    # SOB & fevers a few days post procedure spiked a high fever 5 am on 1/25 (procedure was on 1/23). Influenza negative. Blood  culture 1/25/19 5 am is negative to date. On CXR 1/24 at 1900 (were serially monitoring post procedure PTX) found to have low lung volumes & streaky basilar opacities, likely atelectasis. CXR 1/25 at 12 pm found to have bibasilar pleural effusions with associated atelectasis. UA was unremarkable.  - concern for post procedure intra-abdominal infection vs flu vs pneumonia  - started on zosyn & blood cultures drawn  - will repeat CXR this AM (check on new effusions from yesterday, observe for infiltrate)  - repeat CBC & BMP in process     #Pneumoperitoneum  Expected finding post TACE and Liver ablation.     #Cirrhosis 2/2 ESTRADA  #Thrombocytopenia 2/2 ESTRADA  Per pt, he was on the transplant list at [place] when he lived in [place].  He follows with Dr. Banuelos in the hepatology clinic now. Is planning on getting on the transplant list here.    - Continue PTA Lactulose and Rifaximin     #DMII  - Hold PTA Farxiga and Tresiba  - 1 unit per 4g Carb scale and high SSI     #HTN  - Continue Carvedilol 12.5 mg BID     #BPH  - Continue Flomax     FEN  - IVF none  - CLD carb controlled     PPx  - VTE: mechanical only in case of procedure  - GI: PTA  PPI     Code status: Full code  DISPO:     Interval history  Took tylenol 1 x yesterday for pain. 91% on RA per RN notes. Not recording output. No other PRn's utilized. Using high sliding scale for elevated glucose.    Physical Exam  Constitutional: Awake, alert, cooperative, in NAD.  Eyes: PERRL, EOMI, sclera clear, conjunctiva normal.  ENT: Normocephalic, without obvious abnormality, moist mucus membranes, no lesions or thrush.   Respiratory: Non-labored breathing, good air exchange, CTAB, no crackles or wheezing.  Cardiovascular: RRR, no murmur noted.  GI: +BS, soft, non-distended, non-tender, no masses palpated, no hepatosplenomegaly.  Skin: No concerning lesions or rash on exposed areas.  Musculoskeletal: No edema alba LEs  Neurologic: Awake, A&O x 3. Cranial nerves II-XII are  grossly intact.   Neuropsychiatric: Calm, normal affect     Rounding:  Temp:  [98.5  F (36.9  C)-100  F (37.8  C)] 98.5  F (36.9  C)  Heart Rate:  [71-82] 79  Resp:  [18-20] 20  BP: (102-112)/(54-65) 112/61  SpO2:  [91 %-95 %] 94 %    I/O last 3 completed shifts:  In: 3290 [P.O.:1660; I.V.:630; IV Piggyback:1000]  Out: -  Net positive 3.5 since admission but not recording UOP, good PO intake recorded    Vitals:    01/24/19 1548 01/24/19 2117   Weight: 85.7 kg (189 lb) 88.8 kg (195 lb 11.2 oz)       Recent Labs   Lab 01/25/19 0445 01/24/19 1741 01/23/19  0557 01/22/19  1040   WBC 4.7 7.6 3.8* 3.8*   RBC 3.59* 4.04* 3.57* 4.37*   HGB 13.0* 14.9 12.5* 15.9   HCT 38.6* 43.4 38.4* 46.9   * 107* 108* 107*   MCH 36.2* 36.9* 35.0* 36.4*   MCHC 33.7 34.3 32.6 33.9   RDW 14.0 14.4 14.6 14.4   PLT 26* 35* 29* 38*     Recent Labs   Lab 01/26/19  0552 01/25/19 0445 01/24/19  1741 01/23/19  0557 01/22/19  1040   NA  --  139 139 143 143   POTASSIUM  --  4.0 4.5 4.4 4.2   CHLORIDE  --  106 106 112* 110*   CO2  --  26 22 22 26   ANIONGAP  --  6 12 9 8   GLC  --  196* 141* 135* 154*   BUN  --  36* 31* 21 19   CR  --  1.31* 1.18 1.07 1.06   GFRESTIMATED  --  61 69 78 79   GFRESTBLACK  --  71 80 >90 >90   DEBORAH  --  7.6* 8.3* 7.9* 8.5   MAG  --  2.0  --   --   --    PHOS 3.4 2.1*  --   --   --    PROTTOTAL  --   --  7.5 6.2* 7.9   ALBUMIN  --   --  3.1* 2.5* 3.3*   BILITOTAL  --   --  4.9* 1.7* 1.5*   ALKPHOS  --   --  91 90 158*   AST  --   --  721* 108* 50*   ALT  --   --  394* 73* 45     Recent Results (from the past 24 hour(s))   XR Chest 2 Views    Narrative    EXAMINATION: XR CHEST 2 VW, 1/25/2019 10:03 AM    INDICATION: follow up pneumothoraces, 54 y/o s/p TACE (1/22) and MWA  (1/23) for multifocal HCC. ?He presented yesterday with shoulder pain.      COMPARISON: Chest x-ray 1/25/2019    FINDINGS: PA and lateral upright views of the chest.     Trachea is midline. Cardiac silhouette is unchanged.  Unchanged  pneumoperitoneum. Bibasilar atelectasis. Small bilateral pleural  effusions. Small right apical pneumothorax. No acute osseous  abnormality. Soft tissues are unremarkable. Air-filled nondistended  segments of bowel. Right basilar linear atelectasis.      Impression    IMPRESSION:   1. Small right apical pneumothorax.  2. Bibasilar pleural effusions with associated atelectasis.  3. Unchanged appearance of pneumoperitoneum.    I have personally reviewed the examination and initial interpretation  and I agree with the findings.    RODERICK HENDERSON MD     Anti-infectives (From now, onward)    Start     Dose/Rate Route Frequency Ordered Stop    01/25/19 0500  piperacillin-tazobactam (ZOSYN) 3.375 g vial to attach to  mL bag      3.375 g  over 30 Minutes Intravenous EVERY 6 HOURS 01/25/19 0445      01/24/19 2130  rifaximin (XIFAXAN) tablet 550 mg      550 mg Oral 2 TIMES DAILY 01/24/19 2118            artificial tears   Ophthalmic At Bedtime     carvedilol  12.5 mg Oral BID w/meals     docusate sodium  100 mg Oral BID     hypromellose-dextran  1 drop Ophthalmic Q2H While awake     insulin aspart   Subcutaneous QAM AC     insulin aspart   Subcutaneous Daily with lunch     insulin aspart   Subcutaneous Daily with supper     insulin aspart  1-10 Units Subcutaneous TID AC     insulin aspart  1-7 Units Subcutaneous At Bedtime     lactulose  30 g Oral 4x Daily     miconazole   Topical BID     OLANZapine  2.5 mg Oral At Bedtime     omeprazole  40 mg Oral QAM AC     piperacillin-tazobactam  3.375 g Intravenous Q6H     potassium chloride ER  20 mEq Oral Daily     rifaximin  550 mg Oral BID     sodium chloride (PF)  3 mL Intracatheter Q8H     tamsulosin  0.4 mg Oral Daily     vitamin B-12  1,000 mcg Oral QAM     vitamin D3  2,000 Units Oral QAM       - MEDICATION INSTRUCTIONS -         Cecilia Moore, [facility], [phone].  Hematology/Oncology January 26, 2019

## 2019-01-26 NOTE — DISCHARGE SUMMARY
[facility], [place] -- Discharge Summary -- Hematology / Oncology  Date of Admission:  1/24/2019  Date of Discharge:  1/26/2019  Discharging Provider: Cecilia Moore  Date of Service (when I saw the patient): 01/26/19    Discharge Diagnoses   Active Problems:    Hepatic cirrhosis (H)    Type II diabetes mellitus (H)    Bipolar affective disorder in remission (H)    Esophageal varices (H)    HCC (hepatocellular carcinoma) (H)    Pneumothorax    Acute pain of both shoulders    Fever of unknown origin    History of Present Illness  Frandy Workman is a 53 year old male with a history of HCC, cirrhosis secondary to ESTRADA, DMII, HLD, HTN, GERD, BPH who presents for persistent right pneumothorax with right shoulder pain. He underwent TACE on 1/22/19 followed by CT guided microwave liver ablation on 1/23. During the liver ablation procedure on 1/23, he was found to have a small right pneumothorax after hydrodissection. He had a post-procedure CXR showed stable pneumothorax which was too small to be addressed by chest tube. As he was asymptomatic, he was given instructions to return to ED for any sign of worsening pneumothorax. After the procedure he did have shoulder pain. He thought it would get better, however, he had a hard time sleeping last night because of the pain. He describes the pain as sharp, mostly felt in bilateral shoulders and in the bilateral anteriolateral parts of his chest. He also has noticed abdominal distension and he continues to have chronic pain in his lower abdomen. He also noticed a bit of shortness of breath. Because of these symptoms he presented to the ED.      In the ED he has been doing well. He received a dose of toradol with some relief in the pain. After consutlation with IR it was determined that he should be observed overnight. Otherwise he denies nausea/vomiting/diarrhea or fevers. He was put on some O2 for desats to 88% later in the ED.     Hospital  Course  Frandy Workman is a 53 year old male with a history of HCC, cirrhosis secondary to ESTRADA, DMII, HLD, HTN, GERD, BPH who presented for observation after having been found to have persistent right pneumothorax post TACE procedure to segment 3 and 4 on 1/22 and percutaneous CT-guided liver ablation on 1/23/18. few days post procedure spiked a high fever 5 am on 1/25 (procedure was on 1/23). Influenza negative. Blood culture 1/25/19 5 am is negative to date. On CXR 1/24 at 1900 (were serially monitoring post procedure PTX) found to have low lung volumes & streaky basilar opacities, likely atelectasis. CXR 1/25 at 12 pm found to have bibasilar pleural effusions with associated atelectasis. UA was unremarkable. His pain was well controlled on tramadol. Decision was made to transition to azithromycin for bronchitis (had coarse non productive cough - new for him on exam but lungs otherwise clear) & fever attributed to post-embolization syndrome (will continue tramadol, tylenol & ibuprofen outpatient).     The following problems were addressed:     #HCC   Follows with Dr. Lees and Dr. Scott. He underwent routine screening ultrasound on 12/10/18, which showed a non-specific left hepatic lobe lesion. MRI abdomen on 12/23/19 showed 2 lesions, measuring 3.1 cm and 1.1 cm in hepatic segments 4b and 3, that were LIRADS 5. 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 CT chest and bone scan without metastatic disease. He was referred to IR for liver-directed therapy and started transplant evaluation.   - Established care with Dr. Lees on 1/14/19. He underwent TACE on 1/22/19 followed by CT guided microwave ablation on 1/23/19.      # Pneumothorax  # Right shoulder pain  During the liver ablation procedure on 1/23, he was found to have a small right pneumothorax after hydrodissection. He had a post-procedure CXR showed stable right pneumothorax which was too small to be addressed by chest tube. As he was  asymptomatic, he was given instructions to return to ED for any sign of worsening pneumothorax. CXR in ED showed right pneumothorax. Serial CXR completed, PTX stable.   - O2 requirements lessened. Passed walk test 1/26/19 prior to discharge  - Toradol given in ED, will continue ibuprofen PRN outpatient  - Tramadol helpful for pain will send a script at discharge     # SOB & fevers a few days post procedure spiked a high fever 5 am on 1/25 (procedure was on 1/23). Influenza negative. Blood culture 1/25/19 5 am is negative to date. On CXR 1/24 at 1900 (were serially monitoring post procedure PTX) found to have low lung volumes & streaky basilar opacities, likely atelectasis. CXR 1/25 at 12 pm found to have bibasilar pleural effusions with associated atelectasis. UA was unremarkable.  - concern for post procedure intra-abdominal infection vs flu vs pneumonia  - started on zosyn & blood cultures drawn NTD  - repeat CBC & CMP stable 1/26 AM, patient clinically better, still coarse sounding cough (new) but lungs sound clear, heart regular, chest pain less, no further fevers, will transition to a Z pack for atypical cap/bronchitis coverage & discharge to home    #Pneumoperitoneum  Expected finding post TACE and Liver ablation.     #Cirrhosis 2/2 ESTRADA  #Thrombocytopenia 2/2 ESTRADA  Per pt, he was on the transplant list at [place] when he lived in [place].  He follows with Dr. Banuelos in the hepatology clinic now. Is planning on getting on the transplant list here.    - Continue PTA Lactulose and Rifaximin     #DMII  - Hold PTA Farxiga and Tresiba  - 1 unit per 4g Carb scale and high SSI     #HTN  - Continue Carvedilol 12.5 mg BID     #BPH  - Continue Flomax    Cecilia rity  [facility]  [phone]  Hematology/Oncology  January 26, 2019    Code Status  FULL    Primary Care Physician   Natalie Russell    Physical Exam   Vital Signs with Ranges  Temp:  [98.5  F (36.9  C)-100  F (37.8  C)] 99.9  F (37.7  C)  Heart  Rate:  [71-90] 90  Resp:  [18-21] 21  BP: (102-116)/(54-65) 116/59  SpO2:  [91 %-95 %] 92 %  Constitutional: Pleasant male seen laying in bed. In NAD. Appears stated age.  Eyes: Lids and lashes normal, sclera clear, conjunctiva normal.  ENT: Normocephalic,  oral pharynx with moist mucus membranes,   Respiratory: No increased work of breathing, good air exchange, clear to auscultation bilaterally, no crackles or wheezing.  Cardiovascular: Regular rate and rhythm, normal S1 and S2, and no murmur noted.  GI: No masses or scars. +BS. Soft. No tenderness on palpation.  Skin: No bruising or bleeding, normal skin color, texture, turgor, no redness, warmth, or swelling, no rashes, no lesions, no jaundice.  Extremities: There is no redness, warmth, or swelling of the joints. No lower extremity edema. No cyanosis. Pedal pulses 2+ bilaterally. R groin incision with small bruise, no bleeding or tender on palpation.  Neurologic: Awake, alert, oriented to name, place and time.    Vascular access: PIV    Discharge Disposition Home & in stable condition.    Discharge Orders      Adult [facility] Follow-up and recommended labs and tests    Follow up as previously scheduled (IR & GI-Hepatology)    Appointments on [place] and/or [facility] (with [facility] or [facility] provider or service). Call [phone] if you haven't heard regarding these appointments within 7 days of discharge.     Activity    Your activity upon discharge: activity as tolerated     Discharge Instructions    - Okay to utilize tylenol, ibuprofen & tramadol for pain s/p y90 embolization  - Please take 500 mg azithromycin on 1/26 prior to leaving the hospital, then 250 mg PO daily (1/27, 1/28, 1/29, 1/30) to complete a course of PO antibiotics for bronchitis     When to contact your care team    Please call the [facility] Triage RN Line [phone] ([facility] RN available M-F 8-5, after 5 pm the RN Advisor will page the On-Call Oncology Fellow who will return your  "call) for temperature greater than 101.4 F, uncontrolled nausea/vomiting/diarrhea or unrelieved constipation, pain not relieved by medications, bleeding not stopped by pressure, dizziness, chest pain, shortness of breath, changes in level of consciousness, or any other new concerning symptoms.    *Please keep in mind that you may experience Post Embolization Syndrome.     This can include many \"flu-like\" symptoms, such as:     -Fever: May be as high as 102 degrees, can last for a couple of days. We recommend using over the counter medications such as Tylenol or Ibuprofen.      -Nausea: This is common after this procedure, we will give you medication to help with this.      -Decreased appetite: Try to stay hydrated with water if you are unable to eat regular meals.     -Fatigue: This is normal and you should rest; however we do still want you to try to be up and walking around intermittently throughout the day.      *Please keep in mind to stay hydrated after the procedure.     *Abnormal symptoms in which to call or seek immediate help:  1. Confusion  2. Swelling of the lower legs  3. Severe abdominal pain that doesn't go away with pain medications  4. High fevers that do not come down with over the counter medications     Should you exhibit any of the above symptoms, please seek immediate medical attention at your local emergency room or call our hospital at [phone] and ask for the Interventional Radiologist On-call (after hours). You can also contact one of the Interventional Radiology nurses/care coordinators during business hours (Monday - Friday, 7-3:30 pm) at [phone].     Reason for your hospital stay    Admitted for PTX monitoring post - Tace followed by microwave ablation procedures. Developed subsequent fever. Patient started on zosyn & blood cultures drawn. CXR with small pleural effusion & bibasilar atelectasis but stable PTX. Patient has non productive but coarse sounding cough - lungs are clear " on exam without crackles/wheeze/rhonchi. He's able to tolerate sats > 92% on room air. Walk test prior to discharge. Plan to discharge on Z pack for bronchitis. Think fever could also be related to post embolization procedure. He does have mild diffuse abdominal pain, more tender RUQ. Tolerating regular diet, no diarrhea, regular BM. Stable vital signs. Discussed reasons to come back to the hospital if he clinically worsens otherwise follow up as scheduled.     Full Code     Diet    Follow this diet upon discharge: Orders Placed This Encounter      Moderate Consistent CHO Diet     Discharge Medications   Current Discharge Medication List      START taking these medications    Details   acetaminophen (TYLENOL) 325 MG tablet Take 2 tablets (650 mg) by mouth every 4 hours as needed for mild pain    Associated Diagnoses: HCC (hepatocellular carcinoma) (H)      azithromycin (ZITHROMAX) 250 MG tablet Take 1 tablet (250 mg) by mouth daily for 4 days  Qty: 4 tablet, Refills: 0    Associated Diagnoses: HCC (hepatocellular carcinoma) (H)      ibuprofen (ADVIL/MOTRIN) 600 MG tablet Take 1 tablet (600 mg) by mouth every 6 hours as needed for moderate pain    Associated Diagnoses: HCC (hepatocellular carcinoma) (H)      traMADol (ULTRAM) 50 MG tablet Take 1 tablet (50 mg) by mouth every 6 hours as needed for moderate pain  Qty: 20 tablet, Refills: 0    Associated Diagnoses: HCC (hepatocellular carcinoma) (H)         CONTINUE these medications which have NOT CHANGED    Details   Artificial Tear Ointment (REFRESH LACRI-LUBE) OINT Apply 1 Application to eye At Bedtime  Qty: 1 Tube, Refills: 3    Associated Diagnoses: Post-operative state      aspirin (ASPIRIN LOW DOSE) 81 MG EC tablet Take 1 tablet (81 mg) by mouth daily  Qty: 90 tablet, Refills: 2    Associated Diagnoses: Type 2 diabetes mellitus without complication, with long-term current use of insulin (H)      capsaicin (ZOSTRIX) 0.025 % CREA cream To feet 2-3 times daily as  needed.  Qty: 60 g, Refills: 6    Associated Diagnoses: Type II or unspecified type diabetes mellitus with neurological manifestations, not stated as uncontrolled(250.60) (H)      carvedilol (COREG) 12.5 MG tablet TAKE 1 TABLET(12.5 MG) BY MOUTH TWICE DAILY WITH MEALS  Qty: 180 tablet, Refills: 3    Associated Diagnoses: Liver cirrhosis secondary to ESTRADA (H); Secondary esophageal varices without bleeding (H)      Cholecalciferol (VITAMIN D-3 PO) Take 2,000 Units by mouth every morning       cyanocobalamin (RA VITAMIN B-12 TR) 1000 MCG TBCR Take 1,000 mcg by mouth every morning       dapagliflozin (FARXIGA) 10 MG TABS tablet Take 1 tablet (10 mg) by mouth daily  Qty: 90 tablet, Refills: 3    Associated Diagnoses: Type 2 diabetes mellitus with hyperglycemia, with long-term current use of insulin (H)      econazole nitrate 1 % external cream APPLY TOPICALLY DAILY TO FEET AND TOENAILS  Qty: 85 g, Refills: 0    Associated Diagnoses: Tinea pedis of both feet      erythromycin (ROMYCIN) ophthalmic ointment Place into the right eye 3 times daily  Qty: 1 Tube, Refills: 1    Associated Diagnoses: Post-operative state      insulin degludec (TRESIBA) 200 UNIT/ML pen Take 100 units daily.  Qty: 100 mL, Refills: 3    Associated Diagnoses: Type 2 diabetes mellitus with hyperglycemia, with long-term current use of insulin (H)      IRON PO Take by mouth daily      lactulose (CHRONULAC) 10 GM/15ML solution Take 45 mLs (30 g) by mouth 4 times daily  Qty: 5000 mL, Refills: 11    Associated Diagnoses: Liver cirrhosis secondary to ESTRADA (H)      Multiple Vitamin (THERAVITE PO) Take 1 tablet by mouth every morning       OLANZapine (ZYPREXA) 2.5 MG tablet Take 1 tablet (2.5 mg) by mouth At Bedtime  Qty: 90 tablet, Refills: 1    Associated Diagnoses: Insomnia, unspecified type      omeprazole (PRILOSEC) 40 MG capsule Take 1 capsule (40 mg) by mouth daily  Qty: 90 capsule, Refills: 2    Associated Diagnoses: Gastroesophageal reflux disease,  esophagitis presence not specified      potassium chloride SA (K-DUR/KLOR-CON M) 20 MEQ CR tablet Take 1 tablet (20 mEq) by mouth daily  Qty: 90 tablet, Refills: 2    Associated Diagnoses: Hypokalemia      pravastatin (PRAVACHOL) 20 MG tablet Take 1 tablet (20 mg) by mouth daily  Qty: 90 tablet, Refills: 3    Comments: Same dose- dispense as 20 mg tabs.  Associated Diagnoses: Hyperlipidemia LDL goal <100      Propylene Glycol-Glycerin (ARTIFICIAL TEARS) 1-0.3 % SOLN Apply 1 drop to eye every 2 hours (while awake)  Qty: 30 mL, Refills: 11    Associated Diagnoses: Post-operative state      !! rifaximin (XIFAXAN) 550 MG TABS tablet Take 1 tablet (550 mg) by mouth 2 times daily  Qty: 60 tablet, Refills: 11    Associated Diagnoses: Hepatic encephalopathy (H)      tamsulosin (FLOMAX) 0.4 MG capsule Take 1 capsule (0.4 mg) by mouth daily  Qty: 90 capsule, Refills: 3    Associated Diagnoses: Benign prostatic hyperplasia with lower urinary tract symptoms      !! XIFAXAN 550 MG TABS tablet TAKE 1 TABLET(550 MG) BY MOUTH TWICE DAILY  Qty: 60 tablet, Refills: 3    Associated Diagnoses: Hepatic encephalopathy (H)      !! ACCU-CHEK EDINSON PLUS test strip USE TO TEST BLOOD SUGARS FOUR TIMES DAILY OR AS DIRECTED  Qty: 150 strip, Refills: 11    Associated Diagnoses: Type II diabetes mellitus (H)      Artificial Tear Solution (SM ARTIFICIAL TEARS) SOLN Place 1 drop into the right eye every hour as needed Apply at least 4 times daily and as needed for dry eye  Qty: 1 Bottle, Refills: 3    Associated Diagnoses: Dry eyes      BD VIKTORIA U/F 32G X 4 MM insulin pen needle Use 5 per day  Qty: 300 each, Refills: 3    Associated Diagnoses: Type 2 diabetes mellitus without complication, with long-term current use of insulin (H)      !! blood glucose monitoring (ACCU-CHEK EDINSON PLUS) test strip USE TO TEST BLOOD SUGARS FOUR TIMES DAILY OR AS DIRECTED  Qty: 400 strip, Refills: 3    Associated Diagnoses: Type II diabetes mellitus (H)      !! blood  glucose monitoring (ACCU-CHEK EDINSON PLUS) test strip USE TO TEST BLOOD SUGARS FOUR TIMES DAILY OR AS DIRECTED  Qty: 300 strip, Refills: 3    Comments: **Patient requests 90 days supply**  Associated Diagnoses: Type II diabetes mellitus (H)      !! blood glucose monitoring (ACCU-CHEK EDINSON PLUS) test strip Use to test blood sugar 4 times daily  Qty: 400 each, Refills: 3    Associated Diagnoses: Type II diabetes mellitus (H)      blood glucose monitoring (ACCU-CHEK FASTCLIX) lancets Use to test blood sugar 4 times daily or as directed.  1 box = 102 lancets  Qty: 408 each, Refills: 3    Associated Diagnoses: Type II diabetes mellitus (H)      NOVOLOG FLEXPEN 100 UNIT/ML soln INJECT 1 UNIT PER 4 GRAMS OF CARBS AT MEALS AND SNACKS. CORRECTION SCALE OF 1 UNITS PER 25 OVER [address] DOSE 75 UNITERS PER DAY  Qty: 30 mL, Refills: 3    Associated Diagnoses: Type II diabetes mellitus (H)      sildenafil (REVATIO) 20 MG tablet Take 2-3  Tablets before activity by mouth  Qty: 90 tablet, Refills: 3    Comments: CASH  Associated Diagnoses: ED (erectile dysfunction)      tadalafil (CIALIS) 20 MG tablet Take 1 tablet (20 mg) by mouth daily as needed  Qty: 30 tablet, Refills: 0    Associated Diagnoses: Erectile dysfunction, unspecified erectile dysfunction type      UNABLE TO FIND 2 times daily ULTRA MALE ENHANCEMENT POTENCY       !! - Potential duplicate medications found. Please discuss with provider.      STOP taking these medications       oxyCODONE (ROXICODONE) 5 MG tablet Comments:   Reason for Stopping:             Allergies   Allergies   Allergen Reactions     Codeine Other (See Comments)     Cannot take due to liver  Cannot tolerate oral narcotics     Data   Most Recent 3 CBC's:  Recent Labs   Lab Test 01/26/19  0749 01/25/19  0445 01/24/19  1741   WBC 3.8* 4.7 7.6   HGB 11.3* 13.0* 14.9   * 108* 107*   PLT 20* 26* 35*      Most Recent 3 BMP's:  Recent Labs   Lab Test 01/26/19  0749 01/25/19  0445 01/24/19  1741   NA  138 139 139   POTASSIUM 4.2 4.0 4.5   CHLORIDE 108 106 106   CO2 23 26 22   BUN 33* 36* 31*   CR 1.26* 1.31* 1.18   ANIONGAP 7 6 12   DEBORAH 7.1* 7.6* 8.3*   * 196* 141*     Most Recent 2 LFT's:  Recent Labs   Lab Test 01/26/19  0749 01/24/19  1741   * 721*   * 394*   ALKPHOS 69 91   BILITOTAL 3.2* 4.9*     Most Recent 6 Bacteria Isolates From Any Culture (See EPIC Reports for Culture Details):  Recent Labs   Lab Test 01/25/19  0452 01/25/19  0445 11/02/17  1400 11/02/17  1225 10/11/17  1349   CULT No growth after 22 hours No growth after 22 hours Culture negative after 4 weeks  No anaerobes isolated  Moderate growth  Streptococcus anginosus  * Culture negative after 4 weeks  No anaerobes isolated  Moderate growth  Streptococcus anginosus  * Light growth  Streptococcus salivarius group  Susceptibility testing not routinely done  *     Most Recent TSH, T4 and A1c Labs:  Recent Labs   Lab Test 08/08/18  1246   TSH 0.49   T4 1.21   A1C 6.6*

## 2019-01-26 NOTE — PLAN OF CARE
T max 100.0. OVSS. C/o/ left flank pain and with deep breaths, declined pain medications overnight. . Phos replaced, recheck with am labs. Pt sleeping between cares. Cont POC.

## 2019-01-28 ENCOUNTER — TELEPHONE (OUTPATIENT)
Dept: VASCULAR SURGERY | Facility: CLINIC | Age: 54
End: 2019-01-28

## 2019-01-28 NOTE — TELEPHONE ENCOUNTER
"Called pt and inquired on how he is doing.     Pain in abdomen gettnig better  Pain in shoulder subsiding.     Still coughing and feels that it's not getting worse or better. He states that he's been \"  Dealing\" with this since last Friday.     Informed him that we will continue to monitor and should he not get better and/or worse, he should go to ER.     Denies swelling in legs, N/V.    He states that he feels like has 100 fever and then it goes away. He does take Tylenol for this.    Informed him that we are planning on having him return in 1 mos time.   A letter should be on its way.     He is to call sooner if there are any other questions.     He does have an Echo next week, will send msg to Meena Das tx coordinator regarding his cough and going through for an echo.     Rani Valdez RN, BSN  Interventional Radiology Nurse Coordinator   Phone: [phone]          "

## 2019-01-28 NOTE — TELEPHONE ENCOUNTER
Outreach call made to pt to review transplant evaluation appts for next week. Pt verbalized understanding and comfort with plan. Pt confirmed he has a support person coming to call visits.   Pt acknowledged having a cold and plans to see his PCP if needed. Pt denied questions or concerns at this time.

## 2019-01-31 LAB
BACTERIA SPEC CULT: NO GROWTH
BACTERIA SPEC CULT: NO GROWTH
Lab: NORMAL
Lab: NORMAL
SPECIMEN SOURCE: NORMAL
SPECIMEN SOURCE: NORMAL

## 2019-02-02 ASSESSMENT — ENCOUNTER SYMPTOMS
SPUTUM PRODUCTION: 0
CHILLS: 0
HEMOPTYSIS: 0
CHILLS: 0
SHORTNESS OF BREATH: 1
ALTERED TEMPERATURE REGULATION: 1
NAIL CHANGES: 0
WEIGHT GAIN: 1
BRUISES/BLEEDS EASILY: 0
MUSCLE WEAKNESS: 0
NECK PAIN: 1
DECREASED APPETITE: 1
STIFFNESS: 0
JOINT SWELLING: 0
MUSCLE WEAKNESS: 0
SNORES LOUDLY: 0
HALLUCINATIONS: 0
DYSPNEA ON EXERTION: 0
DYSPNEA ON EXERTION: 0
COUGH DISTURBING SLEEP: 1
POOR WOUND HEALING: 0
MUSCLE CRAMPS: 0
ARTHRALGIAS: 0
SKIN CHANGES: 0
MUSCLE CRAMPS: 0
SNORES LOUDLY: 0
WHEEZING: 0
NIGHT SWEATS: 0
WEIGHT LOSS: 0
POLYDIPSIA: 0
SWOLLEN GLANDS: 0
POSTURAL DYSPNEA: 0
POLYPHAGIA: 0
POLYDIPSIA: 0
SKIN CHANGES: 0
WEIGHT GAIN: 1
HEMOPTYSIS: 0
COUGH DISTURBING SLEEP: 1
DECREASED APPETITE: 1
INCREASED ENERGY: 0
STIFFNESS: 0
INCREASED ENERGY: 0
MYALGIAS: 0
NECK PAIN: 1
HALLUCINATIONS: 0
POSTURAL DYSPNEA: 0
FATIGUE: 1
JOINT SWELLING: 0
NIGHT SWEATS: 0
FEVER: 0
WEIGHT LOSS: 0
NAIL CHANGES: 0
SPUTUM PRODUCTION: 0
ARTHRALGIAS: 0
POOR WOUND HEALING: 0
BACK PAIN: 1
COUGH: 0
BRUISES/BLEEDS EASILY: 0
FEVER: 0
MYALGIAS: 0
FATIGUE: 1
WHEEZING: 0
COUGH: 0
BACK PAIN: 1
SWOLLEN GLANDS: 0
POLYPHAGIA: 0
SHORTNESS OF BREATH: 1
ALTERED TEMPERATURE REGULATION: 1

## 2019-02-04 ENCOUNTER — ANCILLARY PROCEDURE (OUTPATIENT)
Dept: ULTRASOUND IMAGING | Facility: CLINIC | Age: 54
End: 2019-02-04
Payer: MEDICARE

## 2019-02-04 ENCOUNTER — ANCILLARY PROCEDURE (OUTPATIENT)
Dept: GENERAL RADIOLOGY | Facility: CLINIC | Age: 54
End: 2019-02-04
Payer: MEDICARE

## 2019-02-04 ENCOUNTER — OFFICE VISIT (OUTPATIENT)
Dept: TRANSPLANT | Facility: CLINIC | Age: 54
End: 2019-02-04
Attending: INTERNAL MEDICINE
Payer: MEDICARE

## 2019-02-04 ENCOUNTER — HOSPITAL ENCOUNTER (OUTPATIENT)
Dept: CARDIOLOGY | Facility: CLINIC | Age: 54
Discharge: HOME | End: 2019-02-04
Attending: INTERNAL MEDICINE | Admitting: INTERNAL MEDICINE
Payer: MEDICARE

## 2019-02-04 ENCOUNTER — ANCILLARY PROCEDURE (OUTPATIENT)
Dept: BONE DENSITY | Facility: CLINIC | Age: 54
End: 2019-02-04
Payer: MEDICARE

## 2019-02-04 DIAGNOSIS — K74.60 CIRRHOSIS (H): ICD-10-CM

## 2019-02-04 DIAGNOSIS — M85.88 OTHER SPECIFIED DISORDERS OF BONE DENSITY AND STRUCTURE, OTHER SITE: ICD-10-CM

## 2019-02-04 DIAGNOSIS — Z11.59 ENCOUNTER FOR SCREENING FOR OTHER VIRAL DISEASES: ICD-10-CM

## 2019-02-04 DIAGNOSIS — K74.60 CIRRHOSIS OF LIVER (H): Primary | ICD-10-CM

## 2019-02-04 DIAGNOSIS — Z79.899 OTHER LONG TERM (CURRENT) DRUG THERAPY: ICD-10-CM

## 2019-02-04 DIAGNOSIS — C22.0 HCC (HEPATOCELLULAR CARCINOMA) (H): ICD-10-CM

## 2019-02-04 DIAGNOSIS — D69.6 THROMBOCYTOPENIA (H): ICD-10-CM

## 2019-02-04 DIAGNOSIS — K76.9 LIVER DISEASE: ICD-10-CM

## 2019-02-04 DIAGNOSIS — Z12.5 ENCOUNTER FOR SCREENING FOR MALIGNANT NEOPLASM OF PROSTATE: ICD-10-CM

## 2019-02-04 LAB
ABO + RH BLD: NORMAL
AFP SERPL-MCNC: 4.2 UG/L (ref 0–8)
ALBUMIN SERPL-MCNC: 2.7 G/DL (ref 3.4–5)
ALBUMIN UR-MCNC: NEGATIVE MG/DL
ALP SERPL-CCNC: 144 U/L (ref 40–150)
ALT SERPL W P-5'-P-CCNC: 56 U/L (ref 0–70)
ANION GAP SERPL CALCULATED.3IONS-SCNC: 7 MMOL/L (ref 3–14)
APPEARANCE UR: CLEAR
AST SERPL W P-5'-P-CCNC: 62 U/L (ref 0–45)
BASOPHILS # BLD AUTO: 0 10E9/L (ref 0–0.2)
BASOPHILS NFR BLD AUTO: 0.6 %
BILIRUB DIRECT SERPL-MCNC: 0.6 MG/DL (ref 0–0.2)
BILIRUB SERPL-MCNC: 1.7 MG/DL (ref 0.2–1.3)
BILIRUB UR QL STRIP: NEGATIVE
BLD GP AB SCN SERPL QL: NORMAL
BLD GP AB SCN TITR SERPL: NORMAL {TITER}
BLOOD BANK CMNT PATIENT-IMP: NORMAL
BUN SERPL-MCNC: 17 MG/DL (ref 7–30)
CALCIUM SERPL-MCNC: 7.9 MG/DL (ref 8.5–10.1)
CHLORIDE SERPL-SCNC: 110 MMOL/L (ref 94–109)
CHOLEST SERPL-MCNC: 131 MG/DL
CMV IGG SERPL QL IA: 0.2 AI (ref 0–0.8)
CO2 SERPL-SCNC: 22 MMOL/L (ref 20–32)
COLOR UR AUTO: YELLOW
COPATH REPORT: NORMAL
CREAT SERPL-MCNC: 0.96 MG/DL (ref 0.66–1.25)
CREAT UR-MCNC: 165 MG/DL
DEPRECATED CALCIDIOL+CALCIFEROL SERPL-MC: 40 UG/L (ref 20–75)
DIFFERENTIAL METHOD BLD: ABNORMAL
EBV VCA IGG SER QL IA: >8 AI (ref 0–0.8)
EOSINOPHIL # BLD AUTO: 0.1 10E9/L (ref 0–0.7)
EOSINOPHIL NFR BLD AUTO: 1.8 %
ERYTHROCYTE [DISTWIDTH] IN BLOOD BY AUTOMATED COUNT: 15 % (ref 10–15)
FOLATE SERPL-MCNC: 23 NG/ML
GFR SERPL CREATININE-BSD FRML MDRD: 89 ML/MIN/{1.73_M2}
GLUCOSE SERPL-MCNC: 112 MG/DL (ref 70–99)
GLUCOSE UR STRIP-MCNC: 150 MG/DL
HAV IGG SER QL IA: REACTIVE
HBV CORE AB SERPL QL IA: NONREACTIVE
HBV SURFACE AB SERPL IA-ACNC: 286.43 M[IU]/ML
HBV SURFACE AG SERPL QL IA: NONREACTIVE
HCT VFR BLD AUTO: 36.8 % (ref 40–53)
HCV AB SERPL QL IA: NONREACTIVE
HDLC SERPL-MCNC: 26 MG/DL
HGB BLD-MCNC: 13.1 G/DL (ref 13.3–17.7)
HGB UR QL STRIP: NEGATIVE
HIV 1+2 AB+HIV1 P24 AG SERPL QL IA: NONREACTIVE
IMM GRANULOCYTES # BLD: 0 10E9/L (ref 0–0.4)
IMM GRANULOCYTES NFR BLD: 0.6 %
INR PPP: 1.39 (ref 0.86–1.14)
INTERPRETATION ECG - MUSE: NORMAL
KETONES UR STRIP-MCNC: NEGATIVE MG/DL
LDLC SERPL CALC-MCNC: 81 MG/DL
LEUKOCYTE ESTERASE UR QL STRIP: NEGATIVE
LYMPHOCYTES # BLD AUTO: 0.5 10E9/L (ref 0.8–5.3)
LYMPHOCYTES NFR BLD AUTO: 13.6 %
MCH RBC QN AUTO: 37 PG (ref 26.5–33)
MCHC RBC AUTO-ENTMCNC: 35.6 G/DL (ref 31.5–36.5)
MCV RBC AUTO: 104 FL (ref 78–100)
MONOCYTES # BLD AUTO: 0.4 10E9/L (ref 0–1.3)
MONOCYTES NFR BLD AUTO: 12.7 %
MUCOUS THREADS #/AREA URNS LPF: PRESENT /LPF
NEUTROPHILS # BLD AUTO: 2.3 10E9/L (ref 1.6–8.3)
NEUTROPHILS NFR BLD AUTO: 70.7 %
NITRATE UR QL: NEGATIVE
NONHDLC SERPL-MCNC: 105 MG/DL
NRBC # BLD AUTO: 0 10*3/UL
NRBC BLD AUTO-RTO: 0 /100
PH UR STRIP: 5 PH (ref 5–7)
PLATELET # BLD AUTO: 62 10E9/L (ref 150–450)
POTASSIUM SERPL-SCNC: 3.7 MMOL/L (ref 3.4–5.3)
PROT SERPL-MCNC: 6.6 G/DL (ref 6.8–8.8)
PROT UR-MCNC: 0.22 G/L
PROT/CREAT 24H UR: 0.14 G/G CR (ref 0–0.2)
PSA SERPL-ACNC: 0.4 UG/L (ref 0–4)
RBC # BLD AUTO: 3.54 10E12/L (ref 4.4–5.9)
RBC #/AREA URNS AUTO: 1 /HPF (ref 0–2)
RETICS # AUTO: 112.9 10E9/L (ref 25–95)
RETICS/RBC NFR AUTO: 3.2 % (ref 0.5–2)
SODIUM SERPL-SCNC: 139 MMOL/L (ref 133–144)
SOURCE: ABNORMAL
SP GR UR STRIP: 1.02 (ref 1–1.03)
SPECIMEN EXP DATE BLD: NORMAL
SPECIMEN EXP DATE BLD: NORMAL
T PALLIDUM AB SER QL: NONREACTIVE
TRIGL SERPL-MCNC: 117 MG/DL
UROBILINOGEN UR STRIP-MCNC: 0 MG/DL (ref 0–2)
VIT B12 SERPL-MCNC: 3006 PG/ML (ref 193–986)
WBC # BLD AUTO: 3.6 10E9/L (ref 4–11)
WBC #/AREA URNS AUTO: 1 /HPF (ref 0–5)

## 2019-02-04 PROCEDURE — 86706 HEP B SURFACE ANTIBODY: CPT | Performed by: RADIOLOGY

## 2019-02-04 PROCEDURE — 93321 DOPPLER ECHO F-UP/LMTD STD: CPT | Mod: 26 | Performed by: INTERNAL MEDICINE

## 2019-02-04 PROCEDURE — 88185 FLOWCYTOMETRY/TC ADD-ON: CPT | Performed by: INTERNAL MEDICINE

## 2019-02-04 PROCEDURE — 86901 BLOOD TYPING SEROLOGIC RH(D): CPT | Performed by: INTERNAL MEDICINE

## 2019-02-04 PROCEDURE — 86644 CMV ANTIBODY: CPT | Performed by: RADIOLOGY

## 2019-02-04 PROCEDURE — 25000125 ZZHC RX 250: Performed by: INTERNAL MEDICINE

## 2019-02-04 PROCEDURE — 80307 DRUG TEST PRSMV CHEM ANLYZR: CPT | Performed by: INTERNAL MEDICINE

## 2019-02-04 PROCEDURE — 40000866 ZZHCL STATISTIC HIV 1/2 ANTIGEN/ANTIBODY PRETRANSPLANT ONLY: Performed by: RADIOLOGY

## 2019-02-04 PROCEDURE — 36415 COLL VENOUS BLD VENIPUNCTURE: CPT | Performed by: RADIOLOGY

## 2019-02-04 PROCEDURE — 86850 RBC ANTIBODY SCREEN: CPT | Performed by: INTERNAL MEDICINE

## 2019-02-04 PROCEDURE — 25500064 ZZH RX 255 OP 636: Performed by: INTERNAL MEDICINE

## 2019-02-04 PROCEDURE — 86886 COOMBS TEST INDIRECT TITER: CPT | Performed by: INTERNAL MEDICINE

## 2019-02-04 PROCEDURE — 82306 VITAMIN D 25 HYDROXY: CPT | Performed by: RADIOLOGY

## 2019-02-04 PROCEDURE — G0103 PSA SCREENING: HCPCS | Performed by: RADIOLOGY

## 2019-02-04 PROCEDURE — G0472 HEP C SCREEN HIGH RISK/OTHER: HCPCS | Performed by: RADIOLOGY

## 2019-02-04 PROCEDURE — 40001005 ZZHCL STATISTIC FLOW >15 ABY TC 88189: Performed by: INTERNAL MEDICINE

## 2019-02-04 PROCEDURE — 82105 ALPHA-FETOPROTEIN SERUM: CPT | Performed by: RADIOLOGY

## 2019-02-04 PROCEDURE — 88184 FLOWCYTOMETRY/ TC 1 MARKER: CPT | Performed by: INTERNAL MEDICINE

## 2019-02-04 PROCEDURE — 80061 LIPID PANEL: CPT | Performed by: RADIOLOGY

## 2019-02-04 PROCEDURE — 85004 AUTOMATED DIFF WBC COUNT: CPT | Performed by: RADIOLOGY

## 2019-02-04 PROCEDURE — 82746 ASSAY OF FOLIC ACID SERUM: CPT | Performed by: INTERNAL MEDICINE

## 2019-02-04 PROCEDURE — 86480 TB TEST CELL IMMUN MEASURE: CPT | Performed by: INTERNAL MEDICINE

## 2019-02-04 PROCEDURE — 40000611 ZZHCL STATISTIC MORPHOLOGY W/INTERP HEMEPATH TC 85060: Performed by: INTERNAL MEDICINE

## 2019-02-04 PROCEDURE — 84156 ASSAY OF PROTEIN URINE: CPT | Mod: XU | Performed by: INTERNAL MEDICINE

## 2019-02-04 PROCEDURE — 36415 COLL VENOUS BLD VENIPUNCTURE: CPT | Performed by: INTERNAL MEDICINE

## 2019-02-04 PROCEDURE — 86900 BLOOD TYPING SEROLOGIC ABO: CPT | Performed by: INTERNAL MEDICINE

## 2019-02-04 PROCEDURE — 86704 HEP B CORE ANTIBODY TOTAL: CPT | Performed by: RADIOLOGY

## 2019-02-04 PROCEDURE — 93325 DOPPLER ECHO COLOR FLOW MAPG: CPT | Mod: 26 | Performed by: INTERNAL MEDICINE

## 2019-02-04 PROCEDURE — 40000264 ECHO STRESS ECHOCARDIOGRAM

## 2019-02-04 PROCEDURE — G0499 HEPB SCREEN HIGH RISK INDIV: HCPCS | Performed by: RADIOLOGY

## 2019-02-04 PROCEDURE — 25000128 H RX IP 250 OP 636: Performed by: INTERNAL MEDICINE

## 2019-02-04 PROCEDURE — 93018 CV STRESS TEST I&R ONLY: CPT | Performed by: INTERNAL MEDICINE

## 2019-02-04 PROCEDURE — 86780 TREPONEMA PALLIDUM: CPT | Performed by: RADIOLOGY

## 2019-02-04 PROCEDURE — 81001 URINALYSIS AUTO W/SCOPE: CPT | Performed by: INTERNAL MEDICINE

## 2019-02-04 PROCEDURE — 86665 EPSTEIN-BARR CAPSID VCA: CPT | Performed by: RADIOLOGY

## 2019-02-04 PROCEDURE — 86708 HEPATITIS A ANTIBODY: CPT | Performed by: RADIOLOGY

## 2019-02-04 PROCEDURE — 93350 STRESS TTE ONLY: CPT | Mod: 26 | Performed by: INTERNAL MEDICINE

## 2019-02-04 PROCEDURE — 93016 CV STRESS TEST SUPVJ ONLY: CPT | Performed by: INTERNAL MEDICINE

## 2019-02-04 RX ORDER — METOPROLOL TARTRATE 1 MG/ML
1-30 INJECTION, SOLUTION INTRAVENOUS
Status: DISPENSED | OUTPATIENT
Start: 2019-02-04 | End: 2019-02-04

## 2019-02-04 RX ORDER — ATROPINE SULFATE 0.4 MG/ML
.2-2 AMPUL (ML) INJECTION
Status: COMPLETED | OUTPATIENT
Start: 2019-02-04 | End: 2019-02-04

## 2019-02-04 RX ORDER — DOBUTAMINE HYDROCHLORIDE 200 MG/100ML
10-50 INJECTION INTRAVENOUS CONTINUOUS
Status: ACTIVE | OUTPATIENT
Start: 2019-02-04 | End: 2019-02-04

## 2019-02-04 RX ADMIN — METOPROLOL TARTRATE 2 MG: 5 INJECTION, SOLUTION INTRAVENOUS at 09:11

## 2019-02-04 RX ADMIN — DOBUTAMINE HYDROCHLORIDE 10 MCG/KG/MIN: 200 INJECTION INTRAVENOUS at 08:55

## 2019-02-04 RX ADMIN — HUMAN ALBUMIN MICROSPHERES AND PERFLUTREN 7 ML: 10; .22 INJECTION, SOLUTION INTRAVENOUS at 09:13

## 2019-02-04 RX ADMIN — ATROPINE SULFATE 2 MG: 0.4 INJECTION, SOLUTION INTRAMUSCULAR; INTRAVENOUS; SUBCUTANEOUS at 09:08

## 2019-02-04 NOTE — LETTER
Date:February 20, 2019      Provider requested that no letter be sent. Do not send.       [facility] Health Information

## 2019-02-04 NOTE — PROGRESS NOTES
Pt here for dobutamine stress test.  Test, meds and side effects reviewed with patient.  Goal  bpm, max HR achieved was 111 bpm with maximum doses of 50 mcg/kg/min Dobutamine and a total of 2 mg IV atropine.  Gave a total of 2 mg IV Metoprolol to bring HR back to baseline.  Post monitoring complete and VSS.  Pt escorted out to the gold waiting room.

## 2019-02-04 NOTE — LETTER
2/4/2019       RE: Frandy Workman  [address]  [place] [address]     Dear Colleague,    Thank you for referring your patient, Frandy Workman, to the [facility]ID [facility]GAN TRANSPLANT at [facility]. Please see a copy of my visit note below.    Liver Transplant Evaluation/Teaching    TEACHING TOPICS: Evaluation Process, Evaluation Items, Diagnostic Studies, Consultation, Chemical Dependency Policy, CD Eval, Donor Source, Liver Allocation, MELD System, UNOS, Waiting List, Follow up while on transplant list, Follow up after transplantation, Infection and Rejection, Immunosuppression , Medication Teaching, Lab Recording after transplant, Laboratory Frequency after transplant , Consent for evaluation and One year survival rates  INSTRUCTIONAL MATERIAL USED/GIVEN: Liver Transplant Handbook, MELD Booklet, Donor Booklet, Web Sites Options, Verbal instructions, Multiple Listing Brochure , Consent for evaluation signed, One year survival rates and SRTR (Scientific Registry) Data  Person(s) involved in teaching: patient and lifelong friend  Asks Questions: YES  Eager to Learn: YES  Cooperative: YES  Receptive (willing/able to accept information): YES  Reason for the appointment, diagnosis and treatment plan:YES  Knowledge of proper use of medications and conditions for which they are ordered (with special attention to potential side effects or drug interactions): YES  Which situations necessitate calling provider and whom to contact:YES  Other: patient tried and then friend signed receipt of information and alcohol possibly. Patient with understanding and agreement of.  Teaching Concerns Addressed   Comments: ED for T 100.5 or > and s/s of GIB  Proper use and care of (medical equip, care aids, etc.):YES  Nutritional needs and diet plan: YES  Pain management techniques: YES  Wound Care: YES  How and/when to access community resources: YES  Patient is aware of and agrees to required  commitment to post-op care and long term follow-up: YES  Patient has name and phone number of transplant coordinator.   Time Spent face-to-face teachin minutes.            Again, thank you for allowing me to participate in the care of your patient.      Sincerely,    Transplant Class

## 2019-02-05 ENCOUNTER — ALLIED HEALTH/NURSE VISIT (OUTPATIENT)
Dept: TRANSPLANT | Facility: CLINIC | Age: 54
End: 2019-02-05
Attending: INTERNAL MEDICINE
Payer: MEDICARE

## 2019-02-05 ENCOUNTER — OFFICE VISIT (OUTPATIENT)
Dept: TRANSPLANT | Facility: CLINIC | Age: 54
End: 2019-02-05
Attending: TRANSPLANT SURGERY
Payer: MEDICARE

## 2019-02-05 ENCOUNTER — PRE VISIT (OUTPATIENT)
Dept: CARDIOLOGY | Facility: CLINIC | Age: 54
End: 2019-02-05

## 2019-02-05 ENCOUNTER — COMMITTEE REVIEW (OUTPATIENT)
Dept: TRANSPLANT | Facility: CLINIC | Age: 54
End: 2019-02-05

## 2019-02-05 ENCOUNTER — OFFICE VISIT (OUTPATIENT)
Dept: CARDIOLOGY | Facility: CLINIC | Age: 54
End: 2019-02-05
Attending: INTERNAL MEDICINE
Payer: MEDICARE

## 2019-02-05 VITALS
OXYGEN SATURATION: 97 % | SYSTOLIC BLOOD PRESSURE: 129 MMHG | DIASTOLIC BLOOD PRESSURE: 74 MMHG | BODY MASS INDEX: 28 KG/M2 | WEIGHT: 195.55 LBS | HEIGHT: 70 IN | HEART RATE: 81 BPM

## 2019-02-05 VITALS
DIASTOLIC BLOOD PRESSURE: 74 MMHG | HEIGHT: 70 IN | SYSTOLIC BLOOD PRESSURE: 129 MMHG | TEMPERATURE: 97.9 F | OXYGEN SATURATION: 97 % | WEIGHT: 195.5 LBS | HEART RATE: 81 BPM | BODY MASS INDEX: 27.99 KG/M2

## 2019-02-05 DIAGNOSIS — Z01.810 PRE-OPERATIVE CARDIOVASCULAR EXAMINATION: ICD-10-CM

## 2019-02-05 DIAGNOSIS — K74.69 OTHER CIRRHOSIS OF LIVER (H): Primary | ICD-10-CM

## 2019-02-05 DIAGNOSIS — Z76.82 AWAITING ORGAN TRANSPLANT STATUS: Primary | ICD-10-CM

## 2019-02-05 DIAGNOSIS — Z76.82 LIVER TRANSPLANT CANDIDATE: ICD-10-CM

## 2019-02-05 DIAGNOSIS — K76.9 LIVER DISEASE: Primary | ICD-10-CM

## 2019-02-05 DIAGNOSIS — R94.30 ABNORMAL CARDIAC FUNCTION TEST: Primary | ICD-10-CM

## 2019-02-05 DIAGNOSIS — R93.1 ABNORMAL FINDINGS DIAGNOSTIC IMAGING OF HEART AND CORONARY CIRCULATION: ICD-10-CM

## 2019-02-05 DIAGNOSIS — K74.60 CIRRHOSIS OF LIVER (H): Primary | ICD-10-CM

## 2019-02-05 DIAGNOSIS — K74.69 OTHER CIRRHOSIS OF LIVER (H): ICD-10-CM

## 2019-02-05 DIAGNOSIS — R93.1 EQUIVOCAL STRESS ECHOCARDIOGRAM: ICD-10-CM

## 2019-02-05 DIAGNOSIS — R94.39 ABNORMAL RESULT OF OTHER CARDIOVASCULAR FUNCTION STUDY: ICD-10-CM

## 2019-02-05 LAB
COPATH REPORT: NORMAL
ETHYL GLUCURONIDE UR QL: NEGATIVE
GAMMA INTERFERON BACKGROUND BLD IA-ACNC: 0.03 IU/ML
M TB IFN-G BLD-IMP: NEGATIVE
M TB IFN-G CD4+ BCKGRND COR BLD-ACNC: 5.88 IU/ML
MITOGEN IGNF BCKGRD COR BLD-ACNC: 0 IU/ML
MITOGEN IGNF BCKGRD COR BLD-ACNC: 0 IU/ML

## 2019-02-05 PROCEDURE — 99214 OFFICE O/P EST MOD 30 MIN: CPT | Mod: ZP | Performed by: INTERNAL MEDICINE

## 2019-02-05 ASSESSMENT — MIFFLIN-ST. JEOR
SCORE: 1720.31
SCORE: 1720.09

## 2019-02-05 NOTE — NURSING NOTE
"Chief Complaint   Patient presents with     Transplant Evaluation     liver     Blood pressure 129/74, pulse 81, temperature 97.9  F (36.6  C), height 1.765 m (5' 9.5\"), weight 88.7 kg (195 lb 8 oz), SpO2 97 %.    Chasity Acosta CMA     "

## 2019-02-05 NOTE — Clinical Note
Cristin, Please call pt and schedule him for dermatology referral. Order in place. This was reviewed during his visit. He can call me with questions. Thanks,Pippa

## 2019-02-05 NOTE — NURSING NOTE
Vitals completed successfully and medication reconciled.     Sharona Thakkar, SIRISHA  11:31 AM  Chief Complaint   Patient presents with     New Patient     54 yo male present for pre-liver evaluation

## 2019-02-05 NOTE — PROGRESS NOTES
Assessment and Plan:  1. liver transplant evaluation - patient is a good candidate overall. Benefits and surgical risks of a liver transplantation were discussed.  2.  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 Portal Vein:Portal vein thrombus - partial  2. Hepatic Artery: Open  3. TIPS: absent  4. Previous Abdominal Surgery: TACE- segment JORDAN  5. Hepatocellular Carcinoma: Yes - within Andrew criteria  6. Ascites: moderate  7. Costal Angle: wide  8. Portopulmonary Hypertension: absent  9. Hepatopulmonary Syndrome: absent  10. Cardiac Evaluation: needs cards eval, also HgbA1C - pending  11. Nutritional Status: Good  12. Diabetes: yes, >15 yrs  13.Hypertension yes  14. Smoker:no        Recommendations:   - cardiology evaluation, given h/o diabetes (~15 year), would recommend L heart cath  - needs to be seen by Derm - non-healing wound on the right ear post right parotid excision 1 year ago  - SW        Patients overall evaluation will be discussed at the Liver Transplant selection committee meeting with a final recommendation on the patients suitability for transplant to be made at that time.    Consult Full  Details:  Frandy Workman was seen in consultation at the request of Dr. Banuelos for evaluation as a potential liver transplant recipient.    Reason for Visit:  Frandy Workman is a 54 year old year old male with nonalcoholic steatopheatitis, who presents for liver transplant evaluation.    HPI:  Dz with cirrhosis in 2013, secondary to ESTRADA  No ETOH since 1996  H/o hepatic encephalopathy, now is better  H/o ascites, no paracentesis   H/o banding but no bleeding  S/p tumor TACE several weeks ago    Past Medical History:   Diagnosis Date     Anemia 2013    Low blood plates current is 37     Arthritis      BPH (benign prostatic hyperplasia)      Cholelithiasis      Conductive hearing loss 8/16/2017    Have a lump on my right side of my face.  Had wax discharge     Depressive  disorder 1986    Suffer effects throughout life     Gastroesophageal reflux disease 12/1/2014    Being treated with Prilosac     HCC (hepatocellular carcinoma) (H) 1/22/2019     Hepatitis 2014    Diagnosed with schrosis ESTRADA in 2014.  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      Past Surgical History:   Procedure Laterality Date     COLONOSCOPY      2015     ESOPHAGOSCOPY, GASTROSCOPY, DUODENOSCOPY (EGD), COMBINED N/A 11/17/2016    Procedure: COMBINED ESOPHAGOSCOPY, GASTROSCOPY, DUODENOSCOPY (EGD);  Surgeon: Santi Rosas MD;  Location:  GI     ESOPHAGOSCOPY, GASTROSCOPY, DUODENOSCOPY (EGD), COMBINED N/A 11/17/2017    Procedure: COMBINED ESOPHAGOSCOPY, GASTROSCOPY, DUODENOSCOPY (EGD);  EGD;  Surgeon: Santi Rosas MD;  Location: [facility]     ESOPHAGOSCOPY, GASTROSCOPY, DUODENOSCOPY (EGD), COMBINED N/A 12/28/2018    Procedure: EGD;  Surgeon: Santi Rosas MD;  Location:  OR     HEAD & NECK SURGERY      12/2017 at [facility].      IMPLANT GOLD WEIGHT EYELID Right 11/16/2017    Procedure: IMPLANT WEIGHT EYELID;  Right Upper Eyelid Weight, right tarsal strip lower eyelid;  Surgeon: Milana Malave MD;  Location:  OR     IR CHEMO EMBOLIZATION  1/22/2019     KNEE SURGERY Left      ORTHOPEDIC SURGERY       PAROTIDECTOMY, RADICAL NECK DISSECTION Right 11/2/2017    Procedure: PAROTIDECTOMY, RADICAL NECK DISSECTION;  Right Superfacial Parotidectomy , Facial nerve repair. with [facility] facial nerve monitor.;  Surgeon: Asiya Morgan MD;  Location: [facility] OR     PICC INSERTION Left 11/06/2017    4fr SL BioFlo PICC, 44cm in the L basilic vein w/ tip in the low SVC     VASCULAR SURGERY       Past Surgical History:   Procedure Laterality Date     COLONOSCOPY      2015     ESOPHAGOSCOPY, GASTROSCOPY, DUODENOSCOPY (EGD), COMBINED N/A 11/17/2016     Procedure: COMBINED ESOPHAGOSCOPY, GASTROSCOPY, DUODENOSCOPY (EGD);  Surgeon: Santi Rosas MD;  Location: [facility]     ESOPHAGOSCOPY, GASTROSCOPY, DUODENOSCOPY (EGD), COMBINED N/A 2017    Procedure: COMBINED ESOPHAGOSCOPY, GASTROSCOPY, DUODENOSCOPY (EGD);  EGD;  Surgeon: Santi Rosas MD;  Location: [facility]     ESOPHAGOSCOPY, GASTROSCOPY, DUODENOSCOPY (EGD), COMBINED N/A 2018    Procedure: EGD;  Surgeon: Santi Rosas MD;  Location:  OR     HEAD & NECK SURGERY      2017 at [facility].      IMPLANT GOLD WEIGHT EYELID Right 2017    Procedure: IMPLANT WEIGHT EYELID;  Right Upper Eyelid Weight, right tarsal strip lower eyelid;  Surgeon: Milana Malave MD;  Location:  OR     IR CHEMO EMBOLIZATION  2019     KNEE SURGERY Left      ORTHOPEDIC SURGERY       PAROTIDECTOMY, RADICAL NECK DISSECTION Right 2017    Procedure: PAROTIDECTOMY, RADICAL NECK DISSECTION;  Right Superfacial Parotidectomy , Facial nerve repair. with [facility] facial nerve monitor.;  Surgeon: Asiya Morgan MD;  Location: [facility] OR     PICC INSERTION Left 2017    4fr SL BioFlo PICC, 44cm in the L basilic vein w/ tip in the low SVC     VASCULAR SURGERY       Family History   Problem Relation Age of Onset     Prostate Cancer Maternal Grandfather      Substance Abuse Maternal Grandfather         Alcohol     Colon Cancer Father 60     Pancreatic Cancer Father 60     Prostate Cancer Father      Colorectal Cancer Father      Macular Degeneration Father      Cancer Father      Glaucoma Father      Colorectal Cancer Maternal Grandmother      Cancer Maternal Grandmother      Substance Abuse Maternal Grandmother         Alcohol     Colorectal Cancer Paternal Grandmother      Cancer Mother      Diabetes Mother          3/2016     Cerebrovascular Disease Mother         Passed away in Feb of this year, 80 years old.     Thyroid Disease Mother      Depression Mother      Asthma Sister         Had  since birth     Thyroid Disease Sister      Depression Sister      Liver Disease No family hx of      Allergies   Allergen Reactions     Codeine Other (See Comments)     Cannot take due to liver  Cannot tolerate oral narcotics     Prior to Admission medications    Medication Sig Start Date End Date Taking? Authorizing Provider   Artificial Tear Ointment (REFRESH LACRI-LUBE) OINT Apply 1 Application to eye At Bedtime 7/2/18  Yes Milana Malave MD   Artificial Tear Solution (SM ARTIFICIAL TEARS) SOLN Place 1 drop into the right eye every hour as needed Apply at least 4 times daily and as needed for dry eye 7/2/18  Yes Milana Malave MD   aspirin (ASPIRIN LOW DOSE) 81 MG EC tablet Take 1 tablet (81 mg) by mouth daily 6/6/18  Yes Brenda Delgadillo MD   BD VIKTORIA U/F 32G X 4 MM insulin pen needle Use 5 per day 4/11/18  Yes Jennifer Wilcox MD   blood glucose monitoring (ACCU-CHEK EDINSON PLUS) test strip USE TO TEST BLOOD SUGARS FOUR TIMES DAILY OR AS DIRECTED 12/15/18  Yes Jennifer Wilcox MD   blood glucose monitoring (ACCU-CHEK EDINSON PLUS) test strip USE TO TEST BLOOD SUGARS FOUR TIMES DAILY OR AS DIRECTED 9/18/18  Yes Jennifer Wilcox MD   blood glucose monitoring (ACCU-CHEK EDINSON PLUS) test strip Use to test blood sugar 4 times daily 10/13/17  Yes Natalie Chun MD   blood glucose monitoring (ACCU-CHEK FASTCLIX) lancets Use to test blood sugar 4 times daily or as directed.  1 box = 102 lancets 10/13/17  Yes Natalie Chun MD   capsaicin (ZOSTRIX) 0.025 % CREA cream To feet 2-3 times daily as needed. 3/8/18  Yes Lamin Gonzalez DPM   carvedilol (COREG) 12.5 MG tablet TAKE 1 TABLET(12.5 MG) BY MOUTH TWICE DAILY WITH MEALS 10/3/18  Yes Natalie Chun MD   Cholecalciferol (VITAMIN D-3 PO) Take 2,000 Units by mouth every morning    Yes Reported, Patient   cyanocobalamin (RA VITAMIN B-12 TR) 1000 MCG TBCR Take 1,000 mcg by mouth  every morning  3/14/16  Yes Reported, Patient   dapagliflozin (FARXIGA) 10 MG TABS tablet Take 1 tablet (10 mg) by mouth daily 8/23/18  Yes Jennifer Wilcox MD   econazole nitrate 1 % external cream APPLY TOPICALLY DAILY TO FEET AND TOENAILS 1/15/19  Yes Lamin Gonzalez DPM   ibuprofen (ADVIL/MOTRIN) 600 MG tablet Take 1 tablet (600 mg) by mouth every 6 hours as needed for moderate pain 1/26/19 2/25/19 Yes Cecilia Moore APRN CNP   insulin degludec (TRESIBA) 200 UNIT/ML pen Take 100 units daily. 7/26/18  Yes Jennifer Wilcox MD   IRON PO Take by mouth daily   Yes Reported, Patient   lactulose (CHRONULAC) 10 GM/15ML solution Take 45 mLs (30 g) by mouth 4 times daily 12/27/18  Yes Santi Rosas MD   Multiple Vitamin (THERAVITE PO) Take 1 tablet by mouth every morning  3/14/16  Yes Reported, Patient   NOVOLOG FLEXPEN 100 UNIT/ML soln INJECT 1 UNIT PER 4 GRAMS OF CARBS AT MEALS AND SNACKS. CORRECTION SCALE OF 1 UNITS PER 25 OVER [address] DOSE 75 UNITERS PER DAY 11/21/18  Yes Natalie Chun MD   OLANZapine (ZYPREXA) 2.5 MG tablet Take 1 tablet (2.5 mg) by mouth At Bedtime 9/18/18  Yes Natalie Chun MD   omeprazole (PRILOSEC) 40 MG capsule Take 1 capsule (40 mg) by mouth daily 6/20/18  Yes Natalie Chun MD   potassium chloride SA (K-DUR/KLOR-CON M) 20 MEQ CR tablet Take 1 tablet (20 mEq) by mouth daily 6/19/18  Yes Natalie Chun MD   pravastatin (PRAVACHOL) 20 MG tablet Take 1 tablet (20 mg) by mouth daily 5/21/18  Yes Jennifer Wilcox MD   Propylene Glycol-Glycerin (ARTIFICIAL TEARS) 1-0.3 % SOLN Apply 1 drop to eye every 2 hours (while awake) 7/12/18  Yes Tom Amezquita, CASSI   rifaximin (XIFAXAN) 550 MG TABS tablet Take 1 tablet (550 mg) by mouth 2 times daily 10/16/18  Yes Santi oRsas MD   sildenafil (REVATIO) 20 MG tablet Take 2-3  Tablets before activity by mouth 8/13/18  Yes  Jennifer Wilcox MD   tamsulosin (FLOMAX) 0.4 MG capsule Take 1 capsule (0.4 mg) by mouth daily 6/20/18  Yes Natalie Chun MD   ACCU-CHEK EDINSON PLUS test strip USE TO TEST BLOOD SUGARS FOUR TIMES DAILY OR AS DIRECTED 11/13/18   Natalie Chun MD   acetaminophen (TYLENOL) 325 MG tablet Take 2 tablets (650 mg) by mouth every 4 hours as needed for mild pain  Patient not taking: Reported on 2/5/2019 1/26/19 2/25/19  Cecilia Moore APRN CNP   azithromycin (ZITHROMAX) 250 MG tablet Take 1 tablet (250 mg) by mouth daily for 4 days 1/27/19 1/31/19  Cecilia Moore APRN CNP   erythromycin (ROMYCIN) ophthalmic ointment Place into the right eye 3 times daily  Patient not taking: Reported on 2/5/2019 7/12/18   Tom Amezquita, CASSI   ferrous sulfate (IRON) 325 (65 Fe) MG tablet Take 1 tablet (325 mg) by mouth 3 times daily (with meals) 10/15/18 1/13/19  Santi Rosas MD   tadalafil (CIALIS) 20 MG tablet Take 1 tablet (20 mg) by mouth daily as needed  Patient not taking: Reported on 2/5/2019 8/8/18   Jennifer Wilcox MD   traMADol (ULTRAM) 50 MG tablet Take 1 tablet (50 mg) by mouth every 6 hours as needed for moderate pain 1/26/19 1/29/19  Cecilia Moore APRN CNP   UNABLE TO FIND 2 times daily ULTRA MALE ENHANCEMENT POTENCY    Reported, Patient   XIFAXAN 550 MG TABS tablet TAKE 1 TABLET(550 MG) BY MOUTH TWICE DAILY  Patient not taking: Reported on 2/5/2019 11/12/18   Santi Rosas MD       Previous Transplant Hx: No    Cardiovascular Hx:       h/o Cardiac Issues: No       Exercise Tolerance: no chest pain or shortness of breath with exertion.    Potential Donor(s): No    ROS:    REVIEW OF SYSTEMS (check box if normal)  [x]                GENERAL  [x]                  PULMONARY [x]                 GENITOURINARY  [x]                 CNS                 [x]                  CARDIAC  [x]                  ENDOCRINE  [x]     "             EARS,NOSE,THROAT [x]                  GASTROINTESTINAL [x]                  NEUROLOGIC    [x]                 MUSCLOSKELTAL  [x]                   HEMATOLOGY    Examination:     Vitals:  /74   Pulse 81   Temp 97.9  F (36.6  C)   Ht 1.765 m (5' 9.5\")   Wt 88.7 kg (195 lb 8 oz)   SpO2 97%   BMI 28.46 kg/m      GENERAL APPEARANCE: alert and no distress  EYES: PERRL  HENT: mouth without ulcers or lesions  NECK: supple, no adenopathy  RESP: lungs clear to auscultation - no rales, rhonchi or wheezes  CV: regular rhythm, normal rate, no rub   ABDOMEN:  soft, nontender, no HSM or masses and bowel sounds normal  MS: extremities normal- no gross deformities noted, no evidence of inflammation in joints, no muscle tenderness  SKIN: no rash  NEURO: Normal strength and tone, sensory exam grossly normal, mentation intact and speech normal  PSYCH: mentation appears normal. and affect normal/bright      Results:   Recent Results (from the past 168 hour(s))   AFP tumor marker    Collection Time: 02/04/19  6:49 AM   Result Value Ref Range    Alpha Fetoprotein 4.2 0 - 8 ug/L   INR    Collection Time: 02/04/19  6:49 AM   Result Value Ref Range    INR 1.39 (H) 0.86 - 1.14   Hepatic panel    Collection Time: 02/04/19  6:49 AM   Result Value Ref Range    Bilirubin Direct 0.6 (H) 0.0 - 0.2 mg/dL    Bilirubin Total 1.7 (H) 0.2 - 1.3 mg/dL    Albumin 2.7 (L) 3.4 - 5.0 g/dL    Protein Total 6.6 (L) 6.8 - 8.8 g/dL    Alkaline Phosphatase 144 40 - 150 U/L    ALT 56 0 - 70 U/L    AST 62 (H) 0 - 45 U/L   Basic metabolic panel    Collection Time: 02/04/19  6:49 AM   Result Value Ref Range    Sodium 139 133 - 144 mmol/L    Potassium 3.7 3.4 - 5.3 mmol/L    Chloride 110 (H) 94 - 109 mmol/L    Carbon Dioxide 22 20 - 32 mmol/L    Anion Gap 7 3 - 14 mmol/L    Glucose 112 (H) 70 - 99 mg/dL    Urea Nitrogen 17 7 - 30 mg/dL    Creatinine 0.96 0.66 - 1.25 mg/dL    GFR Estimate 89 >60 mL/min/[1.73_m2]    GFR Estimate If Black >90 >60 " mL/min/[1.73_m2]    Calcium 7.9 (L) 8.5 - 10.1 mg/dL   CBC with platelets    Collection Time: 02/04/19  6:49 AM   Result Value Ref Range    WBC 3.6 (L) 4.0 - 11.0 10e9/L    RBC Count 3.54 (L) 4.4 - 5.9 10e12/L    Hemoglobin 13.1 (L) 13.3 - 17.7 g/dL    Hematocrit 36.8 (L) 40.0 - 53.0 %     (H) 78 - 100 fl    MCH 37.0 (H) 26.5 - 33.0 pg    MCHC 35.6 31.5 - 36.5 g/dL    RDW 15.0 10.0 - 15.0 %    Platelet Count 62 (L) 150 - 450 10e9/L   Leukemia Lymphoma Evaluation (Flow Cytometry)    Collection Time: 02/04/19  6:49 AM   Result Value Ref Range    Copath Report       Patient Name: DAVID SANDERS  MR#: [id number]  Specimen #: [id number]  Collected: 2/4/2019 06:49  Received: 2/4/2019 08:23  Reported: 2/4/2019 16:42  Ordering Phy(s): KIERRA ROSS    For improved result formatting, select 'View Enhanced Report Format' under   Linked Documents section.  _________________________________________    SPECIMEN(S):  Blood    INTERPRETATION:  Blood:         Polytypic B-cells       No aberrant immunophenotype on T-cells       No increase in blasts    COMMENT:  Correlation with morphological findings and clinical presentation is   recommended.    RESULTS:  Percentages reported below are based on the total number of CD45 positive   viable leukocytes. If applicable,  percentage of plasma cells is from total viable nucleated cells.    2% polytypic B cells    10% T cells with a CD4:CD8 ratio of 3.8:1.    0.9% NK cells    CD34 positive blasts are rare to absent.  Resident/Fellow Review by:  Dr. Tristin Vreas    A resident/fellow in an [employer]CG[employer] accredited tra ining program was involved in   the selection of testing, review of  flow scattergrams, and/or interpretation of this case. I, as the senior   physician, attest that I: (i)  confirmed appropriate testing, (ii) examined the relevant flow   scattergrams for the specimen(s); and (ii)  rendered or confirmed the interpretation(s).    ANTIBODIES:  Eight, nine, and ten color  analyses are performed for the following   antigens: CD2, CD3, CD4, CD5, CD7, CD8,  CD10, CD11b, CD13, CD14, CD15, CD16, CD19, CD20, CD33, CD34, CD38, CD45,   CD56, CD64, , HLA-DR, and kappa  and lambda immunoglobulin light chains. Cells are gated to isolate   populations (CD45 versus side scatter and  forward scatter versus side scatter), to exclude debris (forward scatter   versus side scatter) and to exclude  cell doublets (forward scatter height versus forward scatter width and   side scatter height versus side scatter  width). Forward scatter varies with cell size. Side scatter varies with   the amount of cytopl asmic granules.  Intensity for CD45 usually increases as hematolymphoid cells mature.    CLINICAL HISTORY:  54 year old male with mild anemia and thrombocytopenia.    I have personally reviewed all specimens and/or slides, including the   listed special stains, and used them  with my medical judgment to determine the final diagnosis.    Electronically signed out by:    Reno Hussein MD    This test was developed and its performance characteristics determined by   [facility]. It has not been cleared or   approved by the US Food and Drug  Administration.  FDA does not require this test to go through premarket   FDA review. This test is used for  clinical purposes and should not be regarded as investigational or for   research.  This laboratory is certified  under the Clinical Laboratory Improvement Amendments (CLIA) as qualified   to perform high complexity clinical  laboratory testing.    CPT Codes:  A: 68982-QG, 93290-ZMXAXAD,  63417-03-EXNC48(22), 25808-REVS>15    TESTING LAB LOCATION:  [address] [id number] [phone]    COLLECTION SITE:  Client:  [facility]  Location:  [facility] (B)     Folate    Collection Time:  02/04/19  6:49 AM   Result Value Ref Range    Folate 23.0 >5.4 ng/mL   Vitamin B12    Collection Time: 02/04/19  6:49 AM   Result Value Ref Range    Vitamin B12 3,006 (H) 193 - 986 pg/mL   Reticulocyte Count    Collection Time: 02/04/19  6:49 AM   Result Value Ref Range    % Retic 3.2 (H) 0.5 - 2.0 %    Absolute Retic 112.9 (H) 25 - 95 10e9/L   Treponema Abs w Reflex to RPR and Titer    Collection Time: 02/04/19  6:49 AM   Result Value Ref Range    Treponema Antibodies Nonreactive NR^Nonreactive   HIV Antigen Antibody Combo Pretransplant    Collection Time: 02/04/19  6:49 AM   Result Value Ref Range    HIV Antigen Antibody Combo Pretransplant Nonreactive NR^Nonreactive   Hepatitis C antibody    Collection Time: 02/04/19  6:49 AM   Result Value Ref Range    Hepatitis C Antibody Nonreactive NR^Nonreactive   Hepatitis B surface antigen    Collection Time: 02/04/19  6:49 AM   Result Value Ref Range    Hep B Surface Agn Nonreactive NR^Nonreactive   Hepatitis B Surface Antibody    Collection Time: 02/04/19  6:49 AM   Result Value Ref Range    Hepatitis B Surface Antibody 286.43 (H) <8.00 m[IU]/mL   Hepatitis B core antibody    Collection Time: 02/04/19  6:49 AM   Result Value Ref Range    Hepatitis B Core Monique Nonreactive NR^Nonreactive   Hepatitis A Antibody IgG    Collection Time: 02/04/19  6:49 AM   Result Value Ref Range    Hepatitis A Antibody IgG Reactive (AA) NR^Nonreactive   EBV Capsid Antibody IgG    Collection Time: 02/04/19  6:49 AM   Result Value Ref Range    EBV Capsid Antibody IgG >8.0 (H) 0.0 - 0.8 AI   CMV Antibody IgG    Collection Time: 02/04/19  6:49 AM   Result Value Ref Range    CMV Antibody IgG 0.2 0.0 - 0.8 AI   Vitamin D Deficiency    Collection Time: 02/04/19  6:49 AM   Result Value Ref Range    Vitamin D Deficiency screening 40 20 - 75 ug/L   Prostate spec antigen screen    Collection Time: 02/04/19  6:49 AM   Result Value Ref Range    PSA 0.40 0 - 4 ug/L   Lipid Profile    Collection Time:  02/04/19  6:49 AM   Result Value Ref Range    Cholesterol 131 <200 mg/dL    Triglycerides 117 <150 mg/dL    HDL Cholesterol 26 (L) >39 mg/dL    LDL Cholesterol Calculated 81 <100 mg/dL    Non HDL Cholesterol 105 <130 mg/dL   Antibody titer red cell    Collection Time: 02/04/19  6:49 AM   Result Value Ref Range    Antibody Titer       Anti A1 IgM > 256, IgG > 256. Anti B IgM > 256, IgG > 256.   ABO/Rh type and screen    Collection Time: 02/04/19  6:49 AM   Result Value Ref Range    ABO O     RH(D) Pos     Antibody Screen Neg     Test Valid Only At          [facility]    Specimen Expires 02/07/2019    WBC Differential    Collection Time: 02/04/19  6:49 AM   Result Value Ref Range    Diff Method Automated Method     % Neutrophils 70.7 %    % Lymphocytes 13.6 %    % Monocytes 12.7 %    % Eosinophils 1.8 %    % Basophils 0.6 %    % Immature Granulocytes 0.6 %    Nucleated RBCs 0 0 /100    Absolute Neutrophil 2.3 1.6 - 8.3 10e9/L    Absolute Lymphocytes 0.5 (L) 0.8 - 5.3 10e9/L    Absolute Monocytes 0.4 0.0 - 1.3 10e9/L    Absolute Eosinophils 0.1 0.0 - 0.7 10e9/L    Absolute Basophils 0.0 0.0 - 0.2 10e9/L    Abs Immature Granulocytes 0.0 0 - 0.4 10e9/L    Absolute Nucleated RBC 0.0    Protein  random urine with Creat Ratio    Collection Time: 02/04/19  7:05 AM   Result Value Ref Range    Protein Random Urine 0.22 g/L    Protein Total Urine g/gr Creatinine 0.14 0 - 0.2 g/g Cr   Ethyl Glucuronide Urine    Collection Time: 02/04/19  7:05 AM   Result Value Ref Range    Ethyl Glucuronide Urine Negative      UA reflex to Microscopic and Culture    Collection Time: 02/04/19  7:05 AM   Result Value Ref Range    Color Urine Yellow     Appearance Urine Clear     Glucose Urine 150 (A) NEG^Negative mg/dL    Bilirubin Urine Negative NEG^Negative    Ketones Urine Negative NEG^Negative mg/dL    Specific Gravity Urine 1.016 1.003 - 1.035    Blood Urine Negative NEG^Negative    pH Urine 5.0  5.0 - 7.0 pH    Protein Albumin Urine Negative NEG^Negative mg/dL    Urobilinogen mg/dL 0.0 0.0 - 2.0 mg/dL    Nitrite Urine Negative NEG^Negative    Leukocyte Esterase Urine Negative NEG^Negative    Source Midstream Urine     RBC Urine 1 0 - 2 /HPF    WBC Urine 1 0 - 5 /HPF    Mucous Urine Present (A) NEG^Negative /LPF   ABO type [[id number]]    Collection Time: 02/04/19  7:05 AM   Result Value Ref Range    ABO O     RH(D) Pos     Specimen Expires 02/07/2019    Creatinine urine calculation only    Collection Time: 02/04/19  7:05 AM   Result Value Ref Range    Creatinine Urine 165 mg/dL   EKG 12-lead, tracing only [EKG1]    Collection Time: 02/04/19  7:45 AM   Result Value Ref Range    Interpretation ECG Click View Image link to view waveform and result      I had a long discussion with the patient regarding liver transplantation which included but was not limited to  the following points:    1. Liver transplant selection committee process.  2. The federal rules for cadaveric waiting list, the size and blood type matching of the organ. The availability of living-related donor transplantation.  3. The types of donors: brain death donors, non-heart beating donors, partial liver grafts: splits and living donor grafts  4. Extended criteria  Donors (older age, steasosis) and the increased  risk of primary non-function using the extended criteria donors  5. The CDC high risk donors,  Risk of donor transmitted infections and donor transmitted malignancy  6. The liver transplant operation and the associated risks and technical complications which can include intraoperative death, post operative death,  Primary non-function, bleeding requiring re-operations, arterial and biliary complications, bowel perforations, and intra abdominal abscess. Some of these complicaitons may require a second operation.  7. The postoperative course, the ICU stay and risk of postoperative complications which can include sepsis, MI, stroke, brain  injury, pneumonia, pleural effusions, and renal dysfunction.  8. The current 1 year and 5 year graft and patient survivals.  9. The need for life long immunosuppressive therapy and the side effects of these medications, including the possibility of toxicity, opportunistic infections, risk of cancer including lymphoma, and the possibility of rejection even if the patient is taking the medication exactly as prescribed.  10. The need for compliance with medications and follow-up visits in the clinic and thereafter.  11. The patient and family understand these risks and wish to proceed to transplantation       I spent 60 minutes with the patient and more than 50% of the time was spend in direct face to face counseling.

## 2019-02-05 NOTE — PATIENT INSTRUCTIONS
You were seen at the [facility] clinic today.  You saw Dr. Manjeet Ireland  Here are your Instructions:    1. Coronary CTA within 1-2 weeks.   2. Follow up with Dr. Ireland to be determined based on results of CTA.         If you have questions after this visit:  Send a [employer] message or contact Jodi Soriano LPN  Office:  [phone] option #1, then #3 & ask for Jodi (nurse line)  Fax:  [phone]  After Hours:  [phone] option #4 ask to speak to the on-call Cardiologist  Appointments:  [phone] option #1, then option #1

## 2019-02-05 NOTE — PROGRESS NOTES
Outpatient MNT: Liver Transplant Evaluation    Current BMI: 28.5 (HT 69.5 in,  lbs/89 kg)  BMI is within criteria of <40 for liver transplant     Time Spent: 15 minutes  Visit Type: Initial   Referring Physician: Josh  Pt accompanied by: his friend     History of previous txp: none     Nutrition Assessment  Pt reports cooking for self w/o added salt. Uses more pepper.     Vitamins, Supplements, Pertinent Meds: vit D, B12, iron, MVI   Herbal Medicines/Supplements: none     Diet Recall  Breakfast Greek yogurt or cereal or cinnamon toast    Lunch Snacks on some turkey mid afternoon    Dinner Lean Cuisine, red meat 2x/month, some chicken/pork/fish    Snacks Chips, cinnamon raisin bread   Beverages Water, Sprite Zero, diet juice, coffee, 1 gallon milk in 5 days    Dining out 1-2x/month      Physical Activity  Walking has reduced from 1 hour/day a few months ago to 10 min/day currently      Anthropometrics  Height:   69.5 in   BMI:    28.5    Weight Status:Overweight BMI 25-29.9   Weight:  195 lbs            IBW (lb): 163  % IBW: 120    Wt Hx: Pt reports no LEILA but + ascites. UBW was stable b/t 180-185 lbs up until recently, when his weight increased d/t fluid. Does note muscle loss in arms and legs.     Adj/dosing BW: 171 lbs/77 kg       Labs  Recent Labs   Lab Test 02/04/19  0649 08/08/18  1246   CHOL 131 133   HDL 26* 30*   LDL 81 68   TRIG 117 172*       Malnutrition  % Intake: No decreased intake noted  % Weight Loss: difficult to assess  Subcutaneous Fat Loss: Mild/Moderate  Muscle Loss: Mild/Moderate  Fluid Accumulation/Edema: Moderate   Malnutrition Diagnosis: Non-Severe malnutrition in the context of chronic illness     Estimated Nutrition Needs  Energy  0299-6015     (25-30 kcal/kg for maintenance)     Protein  62-77+    (0.8-1+ g/kg for maintenance)           Fluid  1 ml/kcal or per MD        Micronutrient   Na+: <2000 mg/day            Nutrition Diagnosis  Food and nutrition related knowledge deficit  r/t pre liver transplant eval AEB pt verbalized not hearing pre/post transplant diet guidelines.    Nutrition Intervention  Nutrition education provided:  Discussed sodium intake (low sodium foods and drinks, seasoning food without salt and tips for low sodium diet).    Reviewed adequate protein intake. Encouraged receiving protein from both animal and plant based sources. Encouraged a protein bar or powder + additional protein-rich foods between breakfast and afternoon snack. Provided some examples.     Reviewed post txp diet guidelines in brief (will review in further detail post txp):  (1) Review of proper food safety measures d/t immunosuppressant therapy post-op and increased risk for food-borne illness    (2) Avoid the following post txp d/t risk for rejection, unknown effects on the organs, and/or potential interactions with immunosuppressants:  - Herbal, Chinese, holistic, chiropractic, natural, alternative medicines and supplements  - Detoxes and cleanses  - Weight loss pills  - Protein powders or other products with extracts or herbs (ie green tea extract)    (3) Med regimen and possible side effects    Patient Understanding: Pt verbalized understanding of education provided.  Expected Compliance: Good  Follow-Up Plans: PRN     Nutrition Goals  1. Limit Na+ <2000mg/day  2. Pt to verbalize understanding of 3 aspects of post txp education provided  3. Increase protein intake to ideal minimum of 62 g/day    Provided pt with contact info.   Alisha Santiago RD, LD  Pgr [phone]

## 2019-02-05 NOTE — COMMITTEE REVIEW
Abdominal Committee Review Note     Evaluation Date: 2/5/2019  Committee Review Date: 2/5/2019    Organ being evaluated for: Liver    Transplant Phase: Evaluation  Transplant Status: Active    Transplant Coordinator: Pippa Silva  Transplant Surgeon:   Uma Moreno    Referring Physician: Natalie Russell    Primary Diagnosis: Primary Liver Malignancy: Hepatoma (HCC) and Cirrhosis  Secondary Diagnosis: Cirrhosis: Fatty Liver ([place])    Committee Review Members:  Nurse Practitioner Carla Pineda, NP   Nutrition Alisha Santiago, RD   Pharmacy [facility]    - Clinical Dilan Pratt, ALEXYS, Maday Kulkarni, [facility]   Transplant Chelita Novak, RN, Radha Ndiaye, RN, Alexa Ledezma PA-C, Mohamud Lu MD, Jr Gustavo Torres, RN, Shaniqua Dillon, APRN CNP, Mary Beth Gonzales, RN, Rani Perez, RN, Kacey Mcdonnell MD, Santi Banuelos MD, Pippa Silva RN, Yonathan Chino MD, Thomas M. Leventhal, MD, [place] Hernandez MD, Uma Moreno MD       Transplant Eligibility: Cirrhosis with MELD, HCC    Committee Review Decision: Needs Re-presentation    Relative Contraindications:     Absolute Contraindications:     Committee Chair Leventhal, Thomas Michael, MD verbally attested to the committee's decision.    Committee Discussion Details:     Will need angiogram as part of evaluation.  Dermatology referral to evaluate ear lesion.     Rediscuss pending completion of evaluation and treatment of HCC.    Message sent to cardiology and referral placed for dermatology. Message routed for scheduling.

## 2019-02-05 NOTE — PROGRESS NOTES
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      He walks ~1 hour/day when the weather permits without any chest pain or dyspnea, but has decreased his activity due to the cold.   Dobutamine echocardiogram (see below) was non-diagnostic for ischemia. PASP estimate was 17 mmHg+RA pressure (normal.)     ECG 2/4/19 shows: sinus rhythm, VR 79, nonspecific T-wave abnormality inferior leads.     He denies any chest pain, dyspnea at rest or with exertion, PND, orthopnea, peripheral edema, palpitations, lightheadedness or syncope.       PAST MEDICAL HISTORY:  Past Medical History:   Diagnosis Date     Anemia 2013    Low blood plates current is 37     Arthritis      BPH (benign prostatic hyperplasia)      Cholelithiasis      Conductive hearing loss 8/16/2017    Have a lump on my right side of my face.  Had wax discharge     Depressive disorder 1986    Suffer effects throughout life     Gastroesophageal reflux disease 12/1/2014    Being treated with Prilosac     HCC (hepatocellular carcinoma) (H) 1/22/2019     Hepatitis 2014    Diagnosed with schrosis ESTRADA in 2014.  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        CURRENT MEDICATIONS:  Current Outpatient Medications   Medication Sig Dispense Refill     ACCU-CHEK EDINSON PLUS test strip USE TO TEST BLOOD SUGARS FOUR TIMES DAILY OR AS DIRECTED 150 strip 11     acetaminophen (TYLENOL) 325 MG tablet Take 2 tablets (650 mg) by mouth every 4 hours as needed for mild pain (Patient not taking: Reported on 2/5/2019)        Artificial Tear Ointment (REFRESH LACRI-LUBE) OINT Apply 1 Application to eye At Bedtime 1 Tube 3     Artificial Tear Solution (SM ARTIFICIAL TEARS) SOLN Place 1 drop into the right eye every hour as needed Apply at least 4 times daily and as needed for dry eye 1 Bottle 3     aspirin (ASPIRIN LOW DOSE) 81 MG EC tablet Take 1 tablet (81 mg) by mouth daily 90 tablet 2     BD VIKTORIA U/F 32G X 4 MM insulin pen needle Use 5 per day 300 each 3     blood glucose monitoring (ACCU-CHEK EDINSON PLUS) test strip USE TO TEST BLOOD SUGARS FOUR TIMES DAILY OR AS DIRECTED 400 strip 3     blood glucose monitoring (ACCU-CHEK EDINSON PLUS) test strip USE TO TEST BLOOD SUGARS FOUR TIMES DAILY OR AS DIRECTED 300 strip 3     blood glucose monitoring (ACCU-CHEK EDINSON PLUS) test strip Use to test blood sugar 4 times daily 400 each 3     blood glucose monitoring (ACCU-CHEK FASTCLIX) lancets Use to test blood sugar 4 times daily or as directed.  1 box = 102 lancets 408 each 3     capsaicin (ZOSTRIX) 0.025 % CREA cream To feet 2-3 times daily as needed. 60 g 6     carvedilol (COREG) 12.5 MG tablet TAKE 1 TABLET(12.5 MG) BY MOUTH TWICE DAILY WITH MEALS 180 tablet 3     Cholecalciferol (VITAMIN D-3 PO) Take 2,000 Units by mouth every morning        cyanocobalamin (RA VITAMIN B-12 TR) 1000 MCG TBCR Take 1,000 mcg by mouth every morning        dapagliflozin (FARXIGA) 10 MG TABS tablet Take 1 tablet (10 mg) by mouth daily 90 tablet 3     econazole nitrate 1 % external cream APPLY TOPICALLY DAILY TO FEET AND TOENAILS 85 g 0     erythromycin (ROMYCIN) ophthalmic ointment Place into the right eye 3 times daily (Patient not taking: Reported on 2/5/2019) 1 Tube 1     ibuprofen (ADVIL/MOTRIN) 600 MG tablet Take 1 tablet (600 mg) by mouth every 6 hours as needed for moderate pain       insulin degludec (TRESIBA) 200 UNIT/ML pen Take 100 units daily. 100 mL 3     IRON PO Take by mouth daily       lactulose (CHRONULAC) 10 GM/15ML solution Take 45 mLs (30  g) by mouth 4 times daily 5000 mL 11     Multiple Vitamin (THERAVITE PO) Take 1 tablet by mouth every morning        NOVOLOG FLEXPEN 100 UNIT/ML soln INJECT 1 UNIT PER 4 GRAMS OF CARBS AT MEALS AND SNACKS. CORRECTION SCALE OF 1 UNITS PER 25 OVER [address]. AVE DOSE 75 UNITERS PER DAY 30 mL 3     OLANZapine (ZYPREXA) 2.5 MG tablet Take 1 tablet (2.5 mg) by mouth At Bedtime 90 tablet 1     omeprazole (PRILOSEC) 40 MG capsule Take 1 capsule (40 mg) by mouth daily 90 capsule 2     potassium chloride SA (K-DUR/KLOR-CON M) 20 MEQ CR tablet Take 1 tablet (20 mEq) by mouth daily 90 tablet 2     pravastatin (PRAVACHOL) 20 MG tablet Take 1 tablet (20 mg) by mouth daily 90 tablet 3     Propylene Glycol-Glycerin (ARTIFICIAL TEARS) 1-0.3 % SOLN Apply 1 drop to eye every 2 hours (while awake) 30 mL 11     rifaximin (XIFAXAN) 550 MG TABS tablet Take 1 tablet (550 mg) by mouth 2 times daily 60 tablet 11     sildenafil (REVATIO) 20 MG tablet Take 2-3  Tablets before activity by mouth 90 tablet 3     tadalafil (CIALIS) 20 MG tablet Take 1 tablet (20 mg) by mouth daily as needed (Patient not taking: Reported on 2/5/2019) 30 tablet 0     tamsulosin (FLOMAX) 0.4 MG capsule Take 1 capsule (0.4 mg) by mouth daily 90 capsule 3     UNABLE TO FIND 2 times daily ULTRA MALE ENHANCEMENT POTENCY       XIFAXAN 550 MG TABS tablet TAKE 1 TABLET(550 MG) BY MOUTH TWICE DAILY (Patient not taking: Reported on 2/5/2019) 60 tablet 3       PAST SURGICAL HISTORY:  Past Surgical History:   Procedure Laterality Date     COLONOSCOPY      2015     ESOPHAGOSCOPY, GASTROSCOPY, DUODENOSCOPY (EGD), COMBINED N/A 11/17/2016    Procedure: COMBINED ESOPHAGOSCOPY, GASTROSCOPY, DUODENOSCOPY (EGD);  Surgeon: Santi Rosas MD;  Location: [facility]     ESOPHAGOSCOPY, GASTROSCOPY, DUODENOSCOPY (EGD), COMBINED N/A 11/17/2017    Procedure: COMBINED ESOPHAGOSCOPY, GASTROSCOPY, DUODENOSCOPY (EGD);  EGD;  Surgeon: Santi Rosas MD;  Location:  GI     ESOPHAGOSCOPY,  GASTROSCOPY, DUODENOSCOPY (EGD), COMBINED N/A 2018    Procedure: EGD;  Surgeon: Santi Rosas MD;  Location:  OR     HEAD & NECK SURGERY      2017 at [facility].      IMPLANT GOLD WEIGHT EYELID Right 2017    Procedure: IMPLANT WEIGHT EYELID;  Right Upper Eyelid Weight, right tarsal strip lower eyelid;  Surgeon: Milana Malave MD;  Location:  OR     IR CHEMO EMBOLIZATION  2019     KNEE SURGERY Left      ORTHOPEDIC SURGERY       PAROTIDECTOMY, RADICAL NECK DISSECTION Right 2017    Procedure: PAROTIDECTOMY, RADICAL NECK DISSECTION;  Right Superfacial Parotidectomy , Facial nerve repair. with [facility] facial nerve monitor.;  Surgeon: Asiya Morgan MD;  Location: [facility] OR     PICC INSERTION Left 2017    4fr SL BioFlo PICC, 44cm in the L basilic vein w/ tip in the low SVC     VASCULAR SURGERY         ALLERGIES:     Allergies   Allergen Reactions     Codeine Other (See Comments)     Cannot take due to liver  Cannot tolerate oral narcotics       FAMILY HISTORY:  Family History   Problem Relation Age of Onset     Prostate Cancer Maternal Grandfather      Substance Abuse Maternal Grandfather         Alcohol     Colon Cancer Father 60     Pancreatic Cancer Father 60     Prostate Cancer Father      Colorectal Cancer Father      Macular Degeneration Father      Cancer Father      Glaucoma Father      Colorectal Cancer Maternal Grandmother      Cancer Maternal Grandmother      Substance Abuse Maternal Grandmother         Alcohol     Colorectal Cancer Paternal Grandmother      Cancer Mother      Diabetes Mother          3/2016     Cerebrovascular Disease Mother         Passed away in Feb of this year, 80 years old.     Thyroid Disease Mother      Depression Mother      Asthma Sister         Had since birth     Thyroid Disease Sister      Depression Sister      Liver Disease No family hx of    No family history of premature CAD or sudden death.    SOCIAL HISTORY:  Social History      Tobacco Use     Smoking status: Never Smoker     Smokeless tobacco: Former User     Types: Chew     Tobacco comment: 1 tin per week   Substance Use Topics     Alcohol use: No     Alcohol/week: 0.0 oz     Comment: quit Sept. 1996     Drug use: No       ROS:   A comprehensive 14 point review of systems is negative other than as mentioned in HPI.    Exam:  There were no vitals taken for this visit.  GENERAL APPEARANCE: healthy, alert and no distress  EYES: no icterus, no xanthelasmas  ENT: normal palate, mucosa moist, no central cyanosis  NECK: no adenopathy, no asymmetry, masses, or scars, thyroid normal to palpation and no bruits, JVP not elevated  RESPIRATORY: lungs clear to auscultation - no rales, rhonchi or wheezes, no use of accessory muscles, no retractions, respirations are unlabored, normal respiratory rate  CARDIOVASCULAR: regular rhythm, normal S1 with physiologic split S2, no S3 or S4 and no murmur, click or rub, precordium quiet with normal PMI.  GI: +softly distended, non tender, no masses palpable, bowel sounds normal, aorta not enlarged by palpation, no abdominal bruits  EXTREMITIES: peripheral pulses normal, no edema, no bruits  NEURO: alert and oriented to person/place/time, normal speech, gait and affect  VASC: Radial, dorsalis pedis and posterior tibialis pulses 2+ bilaterally.  SKIN: no ecchymoses, no rashes.  PSYCH: cooperative, affect appropriate.     Labs:  Reviewed.       Testing/Procedures:    I personally visualized and interpreted:  Dobutamine echocardiogram 2/4/19:  Non-diagnostic due to failure to achieve target HR. Max HR 66% MPHR.   Normal biventricular size/function/thickness/wall motion at baseline, LVEF=60-65%.   With dobutamine, LVEF>75% and LV cavity size decreases appropriately. No regional wall motion abnormalities.   PA systolic pressure=17 mmHg+RA pressure (20-35 mmHg total, within normal limits.)       Assessment and Plan:   1. Preoperative cardiovascular evaluation:  2.  Abnormal CV function study/Non-diagnostic stress echocardiogram  3. Liver transplant candidate  Based on echocardiographic findings, Mr. Workman has no evidence of portopulmonary hypertension.   Given non-diagnostic stress echocardiogram, plan to proceed with coronary CTA.  Further plan is pending the results of coronary CTA.   -continue beta blockers, antiarrhythmics, and statins on the morning of surgery  -hold diuretics and ACE inhibitors on the morning of surgery    The patient states understanding and is agreeable with plan.     Manjeet Irealnd MD  Cardiology    CC  [facility]ERN[facility]

## 2019-02-05 NOTE — LETTER
2/5/2019       RE: Frandy Workman  [address]  [place] [address]     Dear Colleague,    Thank you for referring your patient, Frandy Workman, to the [facility] SOLID ORGAN TRANSPLANT at [facility]. Please see a copy of my visit note below.    Assessment and Plan:  1. liver transplant evaluation - patient is a good candidate overall. Benefits and surgical risks of a liver transplantation were discussed.  2.  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 Portal Vein:Portal vein thrombus - partial  2. Hepatic Artery: Open  3. TIPS: absent  4. Previous Abdominal Surgery: TACE- segment JORDAN  5. Hepatocellular Carcinoma: Yes - within [place] criteria  6. Ascites: moderate  7. Costal Angle: wide  8. Portopulmonary Hypertension: absent  9. Hepatopulmonary Syndrome: absent  10. Cardiac Evaluation: needs cards eval, also HgbA1C - pending  11. Nutritional Status: Good  12. Diabetes: yes, >15 yrs  13.Hypertension yes  14. Smoker:no      Recommendations:   - cardiology evaluation, given h/o diabetes (~15 year), would recommend L heart cath  - needs to be seen by Derm - non-healing wound on the right ear post right parotid excision 1 year ago  - SW        Patients overall evaluation will be discussed at the Liver Transplant selection committee meeting with a final recommendation on the patients suitability for transplant to be made at that time.    Consult Full  Details:  Frandy Workman was seen in consultation at the request of Dr. Banuelos for evaluation as a potential liver transplant recipient.    Reason for Visit:  Frandy Workman is a 54 year old year old male with nonalcoholic steatopheatitis, who presents for liver transplant evaluation.    HPI:  Dz with cirrhosis in 2013, secondary to ESTRADA  No ETOH since 1996  H/o hepatic encephalopathy, now is better  H/o ascites, no paracentesis   H/o banding but no bleeding  S/p tumor TACE several weeks  ago    Past Medical History:   Diagnosis Date     Anemia 2013    Low blood plates current is 37     Arthritis      BPH (benign prostatic hyperplasia)      Cholelithiasis      Conductive hearing loss 8/16/2017    Have a lump on my right side of my face.  Had wax discharge     Depressive disorder 1986    Suffer effects throughout life     Gastroesophageal reflux disease 12/1/2014    Being treated with Prilosac     HCC (hepatocellular carcinoma) (H) 1/22/2019     Hepatitis 2014    Diagnosed with schrosis ESTRADA in 2014.  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      Past Surgical History:   Procedure Laterality Date     COLONOSCOPY      2015     ESOPHAGOSCOPY, GASTROSCOPY, DUODENOSCOPY (EGD), COMBINED N/A 11/17/2016    Procedure: COMBINED ESOPHAGOSCOPY, GASTROSCOPY, DUODENOSCOPY (EGD);  Surgeon: Santi Rosas MD;  Location:  GI     ESOPHAGOSCOPY, GASTROSCOPY, DUODENOSCOPY (EGD), COMBINED N/A 11/17/2017    Procedure: COMBINED ESOPHAGOSCOPY, GASTROSCOPY, DUODENOSCOPY (EGD);  EGD;  Surgeon: Santi Rosas MD;  Location:  GI     ESOPHAGOSCOPY, GASTROSCOPY, DUODENOSCOPY (EGD), COMBINED N/A 12/28/2018    Procedure: EGD;  Surgeon: Santi Rosas MD;  Location:  OR     HEAD & NECK SURGERY      12/2017 at [facility].      IMPLANT GOLD WEIGHT EYELID Right 11/16/2017    Procedure: IMPLANT WEIGHT EYELID;  Right Upper Eyelid Weight, right tarsal strip lower eyelid;  Surgeon: Milana Malave MD;  Location:  OR     IR CHEMO EMBOLIZATION  1/22/2019     KNEE SURGERY Left      ORTHOPEDIC SURGERY       PAROTIDECTOMY, RADICAL NECK DISSECTION Right 11/2/2017    Procedure: PAROTIDECTOMY, RADICAL NECK DISSECTION;  Right Superfacial Parotidectomy , Facial nerve repair. with [facility] facial nerve monitor.;  Surgeon: Asiya Morgan MD;  Location:  OR      PICC INSERTION Left 11/06/2017    4fr SL BioFlo PICC, 44cm in the L basilic vein w/ tip in the low SVC     VASCULAR SURGERY       Past Surgical History:   Procedure Laterality Date     COLONOSCOPY      2015     ESOPHAGOSCOPY, GASTROSCOPY, DUODENOSCOPY (EGD), COMBINED N/A 11/17/2016    Procedure: COMBINED ESOPHAGOSCOPY, GASTROSCOPY, DUODENOSCOPY (EGD);  Surgeon: Santi Rosas MD;  Location: [facility] GI     ESOPHAGOSCOPY, GASTROSCOPY, DUODENOSCOPY (EGD), COMBINED N/A 11/17/2017    Procedure: COMBINED ESOPHAGOSCOPY, GASTROSCOPY, DUODENOSCOPY (EGD);  EGD;  Surgeon: Santi Rosas MD;  Location: [facility] GI     ESOPHAGOSCOPY, GASTROSCOPY, DUODENOSCOPY (EGD), COMBINED N/A 12/28/2018    Procedure: EGD;  Surgeon: Santi Rosas MD;  Location:  OR     HEAD & NECK SURGERY      12/2017 at [facility].      IMPLANT GOLD WEIGHT EYELID Right 11/16/2017    Procedure: IMPLANT WEIGHT EYELID;  Right Upper Eyelid Weight, right tarsal strip lower eyelid;  Surgeon: Milana Malave MD;  Location:  OR     IR CHEMO EMBOLIZATION  1/22/2019     KNEE SURGERY Left      ORTHOPEDIC SURGERY       PAROTIDECTOMY, RADICAL NECK DISSECTION Right 11/2/2017    Procedure: PAROTIDECTOMY, RADICAL NECK DISSECTION;  Right Superfacial Parotidectomy , Facial nerve repair. with NIM facial nerve monitor.;  Surgeon: Asiya Morgan MD;  Location: [facility] OR     PICC INSERTION Left 11/06/2017    4fr SL BioFlo PICC, 44cm in the L basilic vein w/ tip in the low SVC     VASCULAR SURGERY       Family History   Problem Relation Age of Onset     Prostate Cancer Maternal Grandfather      Substance Abuse Maternal Grandfather         Alcohol     Colon Cancer Father 60     Pancreatic Cancer Father 60     Prostate Cancer Father      Colorectal Cancer Father      Macular Degeneration Father      Cancer Father      Glaucoma Father      Colorectal Cancer Maternal Grandmother      Cancer Maternal Grandmother      Substance Abuse Maternal Grandmother          Alcohol     Colorectal Cancer Paternal Grandmother      Cancer Mother      Diabetes Mother          3/2016     Cerebrovascular Disease Mother         Passed away in Feb of this year, 80 years old.     Thyroid Disease Mother      Depression Mother      Asthma Sister         Had since birth     Thyroid Disease Sister      Depression Sister      Liver Disease No family hx of      Allergies   Allergen Reactions     Codeine Other (See Comments)     Cannot take due to liver  Cannot tolerate oral narcotics     Prior to Admission medications    Medication Sig Start Date End Date Taking? Authorizing Provider   Artificial Tear Ointment (REFRESH LACRI-LUBE) OINT Apply 1 Application to eye At Bedtime 18  Yes Milana Malave MD   Artificial Tear Solution (SM ARTIFICIAL TEARS) SOLN Place 1 drop into the right eye every hour as needed Apply at least 4 times daily and as needed for dry eye 18  Yes Milana Malave MD   aspirin (ASPIRIN LOW DOSE) 81 MG EC tablet Take 1 tablet (81 mg) by mouth daily 18  Yes Brenda Delgadillo MD   BD VIKTORIA U/F 32G X 4 MM insulin pen needle Use 5 per day 18  Yes Jennifer Wilcox MD   blood glucose monitoring (ACCU-CHEK EDINSON PLUS) test strip USE TO TEST BLOOD SUGARS FOUR TIMES DAILY OR AS DIRECTED 12/15/18  Yes Jennifer Wilcox MD   blood glucose monitoring (ACCU-CHEK EDINSON PLUS) test strip USE TO TEST BLOOD SUGARS FOUR TIMES DAILY OR AS DIRECTED 18  Yes Jennifer Wilcox MD   blood glucose monitoring (ACCU-CHEK EDINSON PLUS) test strip Use to test blood sugar 4 times daily 10/13/17  Yes Natalie Chun MD   blood glucose monitoring (ACCU-CHEK FASTCLIX) lancets Use to test blood sugar 4 times daily or as directed.  1 box = 102 lancets 10/13/17  Yes Natalie Chun MD   capsaicin (ZOSTRIX) 0.025 % CREA cream To feet 2-3 times daily as needed. 3/8/18  Yes Lamin Gonzalez, DPBRYANT   carvedilol (COREG) 12.5  MG tablet TAKE 1 TABLET(12.5 MG) BY MOUTH TWICE DAILY WITH MEALS 10/3/18  Yes Natalie Chun MD   Cholecalciferol (VITAMIN D-3 PO) Take 2,000 Units by mouth every morning    Yes Reported, Patient   cyanocobalamin (RA VITAMIN B-12 TR) 1000 MCG TBCR Take 1,000 mcg by mouth every morning  3/14/16  Yes Reported, Patient   dapagliflozin (FARXIGA) 10 MG TABS tablet Take 1 tablet (10 mg) by mouth daily 8/23/18  Yes Jennifer Wilcox MD   econazole nitrate 1 % external cream APPLY TOPICALLY DAILY TO FEET AND TOENAILS 1/15/19  Yes Lamin Gonzalez DPM   ibuprofen (ADVIL/MOTRIN) 600 MG tablet Take 1 tablet (600 mg) by mouth every 6 hours as needed for moderate pain 1/26/19 2/25/19 Yes Cecilia Moore APRN CNP   insulin degludec (TRESIBA) 200 UNIT/ML pen Take 100 units daily. 7/26/18  Yes Jennifer Wilcox MD   IRON PO Take by mouth daily   Yes Reported, Patient   lactulose (CHRONULAC) 10 GM/15ML solution Take 45 mLs (30 g) by mouth 4 times daily 12/27/18  Yes Santi Rosas MD   Multiple Vitamin (THERAVITE PO) Take 1 tablet by mouth every morning  3/14/16  Yes Reported, Patient   NOVOLOG FLEXPEN 100 UNIT/ML soln INJECT 1 UNIT PER 4 GRAMS OF CARBS AT MEALS AND SNACKS. CORRECTION SCALE OF 1 UNITS PER 25 OVER [address] DOSE 75 UNITERS PER DAY 11/21/18  Yes Natalie Chun MD   OLANZapine (ZYPREXA) 2.5 MG tablet Take 1 tablet (2.5 mg) by mouth At Bedtime 9/18/18  Yes Natalie Chun MD   omeprazole (PRILOSEC) 40 MG capsule Take 1 capsule (40 mg) by mouth daily 6/20/18  Yes Natalie Chun MD   potassium chloride SA (K-DUR/KLOR-CON M) 20 MEQ CR tablet Take 1 tablet (20 mEq) by mouth daily 6/19/18  Yes Natalie Chun MD   pravastatin (PRAVACHOL) 20 MG tablet Take 1 tablet (20 mg) by mouth daily 5/21/18  Yes Jennifer Wilcox MD   Propylene Glycol-Glycerin (ARTIFICIAL TEARS) 1-0.3 % SOLN Apply 1 drop to eye  every 2 hours (while awake) 7/12/18  Yes Tom Amezquita, OD   rifaximin (XIFAXAN) 550 MG TABS tablet Take 1 tablet (550 mg) by mouth 2 times daily 10/16/18  Yes Santi Rosas MD   sildenafil (REVATIO) 20 MG tablet Take 2-3  Tablets before activity by mouth 8/13/18  Yes Jennifer Wilcox MD   tamsulosin (FLOMAX) 0.4 MG capsule Take 1 capsule (0.4 mg) by mouth daily 6/20/18  Yes Natalie Chun MD   ACCU-CHEK EDINSON PLUS test strip USE TO TEST BLOOD SUGARS FOUR TIMES DAILY OR AS DIRECTED 11/13/18   Natalie Chun MD   acetaminophen (TYLENOL) 325 MG tablet Take 2 tablets (650 mg) by mouth every 4 hours as needed for mild pain  Patient not taking: Reported on 2/5/2019 1/26/19 2/25/19  Cecilia Moore APRN CNP   azithromycin (ZITHROMAX) 250 MG tablet Take 1 tablet (250 mg) by mouth daily for 4 days 1/27/19 1/31/19  Cecilia Moore APRN CNP   erythromycin (ROMYCIN) ophthalmic ointment Place into the right eye 3 times daily  Patient not taking: Reported on 2/5/2019 7/12/18   Tom Amezquita, OD   ferrous sulfate (IRON) 325 (65 Fe) MG tablet Take 1 tablet (325 mg) by mouth 3 times daily (with meals) 10/15/18 1/13/19  Santi Rosas MD   tadalafil (CIALIS) 20 MG tablet Take 1 tablet (20 mg) by mouth daily as needed  Patient not taking: Reported on 2/5/2019 8/8/18   Jennifer Wilcox MD   traMADol (ULTRAM) 50 MG tablet Take 1 tablet (50 mg) by mouth every 6 hours as needed for moderate pain 1/26/19 1/29/19  Cecilia Moore APRN CNP   UNABLE TO FIND 2 times daily ULTRA MALE ENHANCEMENT POTENCY    Reported, Patient   XIFAXAN 550 MG TABS tablet TAKE 1 TABLET(550 MG) BY MOUTH TWICE DAILY  Patient not taking: Reported on 2/5/2019 11/12/18   Santi Rosas MD       Previous Transplant Hx: No    Cardiovascular Hx:       h/o Cardiac Issues: No       Exercise Tolerance: no chest pain or shortness of breath with  "exertion.    Potential Donor(s): No    ROS:    REVIEW OF SYSTEMS (check box if normal)  [x]                GENERAL  [x]                  PULMONARY [x]                 GENITOURINARY  [x]                 CNS                 [x]                  CARDIAC  [x]                  ENDOCRINE  [x]                 EARS,NOSE,THROAT [x]                  GASTROINTESTINAL [x]                  NEUROLOGIC    [x]                 MUSCLOSKELTAL  [x]                   HEMATOLOGY    Examination:     Vitals:  /74   Pulse 81   Temp 97.9  F (36.6  C)   Ht 1.765 m (5' 9.5\")   Wt 88.7 kg (195 lb 8 oz)   SpO2 97%   BMI 28.46 kg/m       GENERAL APPEARANCE: alert and no distress  EYES: PERRL  HENT: mouth without ulcers or lesions  NECK: supple, no adenopathy  RESP: lungs clear to auscultation - no rales, rhonchi or wheezes  CV: regular rhythm, normal rate, no rub   ABDOMEN:  soft, nontender, no HSM or masses and bowel sounds normal  MS: extremities normal- no gross deformities noted, no evidence of inflammation in joints, no muscle tenderness  SKIN: no rash  NEURO: Normal strength and tone, sensory exam grossly normal, mentation intact and speech normal  PSYCH: mentation appears normal. and affect normal/bright      Results:   Recent Results (from the past 168 hour(s))   AFP tumor marker    Collection Time: 02/04/19  6:49 AM   Result Value Ref Range    Alpha Fetoprotein 4.2 0 - 8 ug/L   INR    Collection Time: 02/04/19  6:49 AM   Result Value Ref Range    INR 1.39 (H) 0.86 - 1.14   Hepatic panel    Collection Time: 02/04/19  6:49 AM   Result Value Ref Range    Bilirubin Direct 0.6 (H) 0.0 - 0.2 mg/dL    Bilirubin Total 1.7 (H) 0.2 - 1.3 mg/dL    Albumin 2.7 (L) 3.4 - 5.0 g/dL    Protein Total 6.6 (L) 6.8 - 8.8 g/dL    Alkaline Phosphatase 144 40 - 150 U/L    ALT 56 0 - 70 U/L    AST 62 (H) 0 - 45 U/L   Basic metabolic panel    Collection Time: 02/04/19  6:49 AM   Result Value Ref Range    Sodium 139 133 - 144 mmol/L    Potassium 3.7 " 3.4 - 5.3 mmol/L    Chloride 110 (H) 94 - 109 mmol/L    Carbon Dioxide 22 20 - 32 mmol/L    Anion Gap 7 3 - 14 mmol/L    Glucose 112 (H) 70 - 99 mg/dL    Urea Nitrogen 17 7 - 30 mg/dL    Creatinine 0.96 0.66 - 1.25 mg/dL    GFR Estimate 89 >60 mL/min/[1.73_m2]    GFR Estimate If Black >90 >60 mL/min/[1.73_m2]    Calcium 7.9 (L) 8.5 - 10.1 mg/dL   CBC with platelets    Collection Time: 02/04/19  6:49 AM   Result Value Ref Range    WBC 3.6 (L) 4.0 - 11.0 10e9/L    RBC Count 3.54 (L) 4.4 - 5.9 10e12/L    Hemoglobin 13.1 (L) 13.3 - 17.7 g/dL    Hematocrit 36.8 (L) 40.0 - 53.0 %     (H) 78 - 100 fl    MCH 37.0 (H) 26.5 - 33.0 pg    MCHC 35.6 31.5 - 36.5 g/dL    RDW 15.0 10.0 - 15.0 %    Platelet Count 62 (L) 150 - 450 10e9/L   Leukemia Lymphoma Evaluation (Flow Cytometry)    Collection Time: 02/04/19  6:49 AM   Result Value Ref Range    Copath Report       Patient Name: DAVID SANDERS  MR#: [id number]  Specimen #: [id number]  Collected: 2/4/2019 06:49  Received: 2/4/2019 08:23  Reported: 2/4/2019 16:42  Ordering Phy(s): KIERRA ROSS    For improved result formatting, select 'View Enhanced Report Format' under   Linked Documents section.  _________________________________________    SPECIMEN(S):  Blood    INTERPRETATION:  Blood:         Polytypic B-cells       No aberrant immunophenotype on T-cells       No increase in blasts    COMMENT:  Correlation with morphological findings and clinical presentation is   recommended.    RESULTS:  Percentages reported below are based on the total number of CD45 positive   viable leukocytes. If applicable,  percentage of plasma cells is from total viable nucleated cells.    2% polytypic B cells    10% T cells with a CD4:CD8 ratio of 3.8:1.    0.9% NK cells    CD34 positive blasts are rare to absent.  Resident/Fellow Review by:  Dr. Tristin Veras    A resident/fellow in an ACGME accredited tra ining program was involved in   the selection of testing, review of  flow  scattergrams, and/or interpretation of this case. I, as the senior   physician, attest that I: (i)  confirmed appropriate testing, (ii) examined the relevant flow   scattergrams for the specimen(s); and (ii)  rendered or confirmed the interpretation(s).    ANTIBODIES:  Eight, nine, and ten color analyses are performed for the following   antigens: CD2, CD3, CD4, CD5, CD7, CD8,  CD10, CD11b, CD13, CD14, CD15, CD16, CD19, CD20, CD33, CD34, CD38, CD45,   CD56, CD64, , HLA-DR, and kappa  and lambda immunoglobulin light chains. Cells are gated to isolate   populations (CD45 versus side scatter and  forward scatter versus side scatter), to exclude debris (forward scatter   versus side scatter) and to exclude  cell doublets (forward scatter height versus forward scatter width and   side scatter height versus side scatter  width). Forward scatter varies with cell size. Side scatter varies with   the amount of cytopl asmic granules.  Intensity for CD45 usually increases as hematolymphoid cells mature.    CLINICAL HISTORY:  54 year old male with mild anemia and thrombocytopenia.    I have personally reviewed all specimens and/or slides, including the   listed special stains, and used them  with my medical judgment to determine the final diagnosis.    Electronically signed out by:    Reno Hussein MD    This test was developed and its performance characteristics determined by   [facility], [facility]. It has not been cleared or   approved by the US Food and Drug  Administration.  FDA does not require this test to go through premarket   FDA review. This test is used for  clinical purposes and should not be regarded as investigational or for   research.  This laboratory is certified  under the Clinical Laboratory Improvement Amendments (CLIA) as qualified   to perform high complexity clinical  laboratory testing.    CPT Codes:  A: 76302-XN, 77080-LQHTLEN,  28943-22-XCLZ98(22),  24811-YXNB>15    TESTING LAB LOCATION:  [facility]  [address], [facility] [address] [place] [address] [phone]    COLLECTION SITE:  Client:  [facility] [place]  [facility]     Folate    Collection Time: 02/04/19  6:49 AM   Result Value Ref Range    Folate 23.0 >5.4 ng/mL   Vitamin B12    Collection Time: 02/04/19  6:49 AM   Result Value Ref Range    Vitamin B12 3,006 (H) 193 - 986 pg/mL   Reticulocyte Count    Collection Time: 02/04/19  6:49 AM   Result Value Ref Range    % Retic 3.2 (H) 0.5 - 2.0 %    Absolute Retic 112.9 (H) 25 - 95 10e9/L   Treponema Abs w Reflex to RPR and Titer    Collection Time: 02/04/19  6:49 AM   Result Value Ref Range    Treponema Antibodies Nonreactive NR^Nonreactive   HIV Antigen Antibody Combo Pretransplant    Collection Time: 02/04/19  6:49 AM   Result Value Ref Range    HIV Antigen Antibody Combo Pretransplant Nonreactive NR^Nonreactive   Hepatitis C antibody    Collection Time: 02/04/19  6:49 AM   Result Value Ref Range    Hepatitis C Antibody Nonreactive NR^Nonreactive   Hepatitis B surface antigen    Collection Time: 02/04/19  6:49 AM   Result Value Ref Range    Hep B Surface Agn Nonreactive NR^Nonreactive   Hepatitis B Surface Antibody    Collection Time: 02/04/19  6:49 AM   Result Value Ref Range    Hepatitis B Surface Antibody 286.43 (H) <8.00 m[IU]/mL   Hepatitis B core antibody    Collection Time: 02/04/19  6:49 AM   Result Value Ref Range    Hepatitis B Core Monique Nonreactive NR^Nonreactive   Hepatitis A Antibody IgG    Collection Time: 02/04/19  6:49 AM   Result Value Ref Range    Hepatitis A Antibody IgG Reactive (AA) NR^Nonreactive   EBV Capsid Antibody IgG    Collection Time: 02/04/19  6:49 AM   Result Value Ref Range    EBV Capsid Antibody IgG >8.0 (H) 0.0 - 0.8 AI   CMV Antibody IgG    Collection Time: 02/04/19  6:49 AM   Result Value Ref Range    CMV Antibody IgG 0.2 0.0 - 0.8  AI   Vitamin D Deficiency    Collection Time: 02/04/19  6:49 AM   Result Value Ref Range    Vitamin D Deficiency screening 40 20 - 75 ug/L   Prostate spec antigen screen    Collection Time: 02/04/19  6:49 AM   Result Value Ref Range    PSA 0.40 0 - 4 ug/L   Lipid Profile    Collection Time: 02/04/19  6:49 AM   Result Value Ref Range    Cholesterol 131 <200 mg/dL    Triglycerides 117 <150 mg/dL    HDL Cholesterol 26 (L) >39 mg/dL    LDL Cholesterol Calculated 81 <100 mg/dL    Non HDL Cholesterol 105 <130 mg/dL   Antibody titer red cell    Collection Time: 02/04/19  6:49 AM   Result Value Ref Range    Antibody Titer       Anti A1 IgM > 256, IgG > 256. Anti B IgM > 256, IgG > 256.   ABO/Rh type and screen    Collection Time: 02/04/19  6:49 AM   Result Value Ref Range    ABO O     RH(D) Pos     Antibody Screen Neg     Test Valid Only At          [facility]    Specimen Expires 02/07/2019    WBC Differential    Collection Time: 02/04/19  6:49 AM   Result Value Ref Range    Diff Method Automated Method     % Neutrophils 70.7 %    % Lymphocytes 13.6 %    % Monocytes 12.7 %    % Eosinophils 1.8 %    % Basophils 0.6 %    % Immature Granulocytes 0.6 %    Nucleated RBCs 0 0 /100    Absolute Neutrophil 2.3 1.6 - 8.3 10e9/L    Absolute Lymphocytes 0.5 (L) 0.8 - 5.3 10e9/L    Absolute Monocytes 0.4 0.0 - 1.3 10e9/L    Absolute Eosinophils 0.1 0.0 - 0.7 10e9/L    Absolute Basophils 0.0 0.0 - 0.2 10e9/L    Abs Immature Granulocytes 0.0 0 - 0.4 10e9/L    Absolute Nucleated RBC 0.0    Protein  random urine with Creat Ratio    Collection Time: 02/04/19  7:05 AM   Result Value Ref Range    Protein Random Urine 0.22 g/L    Protein Total Urine g/gr Creatinine 0.14 0 - 0.2 g/g Cr   Ethyl Glucuronide Urine    Collection Time: 02/04/19  7:05 AM   Result Value Ref Range    Ethyl Glucuronide Urine Negative      UA reflex to Microscopic and Culture    Collection Time: 02/04/19  7:05 AM   Result  Value Ref Range    Color Urine Yellow     Appearance Urine Clear     Glucose Urine 150 (A) NEG^Negative mg/dL    Bilirubin Urine Negative NEG^Negative    Ketones Urine Negative NEG^Negative mg/dL    Specific Gravity Urine 1.016 1.003 - 1.035    Blood Urine Negative NEG^Negative    pH Urine 5.0 5.0 - 7.0 pH    Protein Albumin Urine Negative NEG^Negative mg/dL    Urobilinogen mg/dL 0.0 0.0 - 2.0 mg/dL    Nitrite Urine Negative NEG^Negative    Leukocyte Esterase Urine Negative NEG^Negative    Source Midstream Urine     RBC Urine 1 0 - 2 /HPF    WBC Urine 1 0 - 5 /HPF    Mucous Urine Present (A) NEG^Negative /LPF   ABO type [OQU1509]    Collection Time: 02/04/19  7:05 AM   Result Value Ref Range    ABO O     RH(D) Pos     Specimen Expires 02/07/2019    Creatinine urine calculation only    Collection Time: 02/04/19  7:05 AM   Result Value Ref Range    Creatinine Urine 165 mg/dL   EKG 12-lead, tracing only [EKG1]    Collection Time: 02/04/19  7:45 AM   Result Value Ref Range    Interpretation ECG Click View Image link to view waveform and result      I had a long discussion with the patient regarding liver transplantation which included but was not limited to  the following points:    1. Liver transplant selection committee process.  2. The federal rules for cadaveric waiting list, the size and blood type matching of the organ. The availability of living-related donor transplantation.  3. The types of donors: brain death donors, non-heart beating donors, partial liver grafts: splits and living donor grafts  4. Extended criteria  Donors (older age, steasosis) and the increased  risk of primary non-function using the extended criteria donors  5. The CDC high risk donors,  Risk of donor transmitted infections and donor transmitted malignancy  6. The liver transplant operation and the associated risks and technical complications which can include intraoperative death, post operative death,  Primary non-function, bleeding  requiring re-operations, arterial and biliary complications, bowel perforations, and intra abdominal abscess. Some of these complicaitons may require a second operation.  7. The postoperative course, the ICU stay and risk of postoperative complications which can include sepsis, MI, stroke, brain injury, pneumonia, pleural effusions, and renal dysfunction.  8. The current 1 year and 5 year graft and patient survivals.  9. The need for life long immunosuppressive therapy and the side effects of these medications, including the possibility of toxicity, opportunistic infections, risk of cancer including lymphoma, and the possibility of rejection even if the patient is taking the medication exactly as prescribed.  10. The need for compliance with medications and follow-up visits in the clinic and thereafter.  11. The patient and family understand these risks and wish to proceed to transplantation     I spent 60 minutes with the patient and more than 50% of the time was spend in direct face to face counseling.      Uma Moreno MD

## 2019-02-05 NOTE — LETTER
2/5/2019      RE: Frandy Workman  [address]  [facility] [address]       Dear Colleague,    Thank you for the opportunity to participate in the care of your patient, Frandy Workman, at the [facility]. Please see a copy of my visit note below.    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      He walks ~1 hour/day when the weather permits without any chest pain or dyspnea, but has decreased his activity due to the cold.   Dobutamine echocardiogram (see below) was non-diagnostic for ischemia. PASP estimate was 17 mmHg+RA pressure (normal.)     ECG 2/4/19 shows: sinus rhythm, VR 79, nonspecific T-wave abnormality inferior leads.     He denies any chest pain, dyspnea at rest or with exertion, PND, orthopnea, peripheral edema, palpitations, lightheadedness or syncope.       PAST MEDICAL HISTORY:  Past Medical History:   Diagnosis Date     Anemia 2013    Low blood plates current is 37     Arthritis      BPH (benign prostatic hyperplasia)      Cholelithiasis      Conductive hearing loss 8/16/2017    Have a lump on my right side of my face.  Had wax discharge     Depressive disorder 1986    Suffer effects throughout life     Gastroesophageal reflux disease 12/1/2014    Being treated with Prilosac     HCC (hepatocellular carcinoma) (H) 1/22/2019     Hepatitis 2014    Diagnosed with schrosis ESTRADA in 2014.  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        CURRENT MEDICATIONS:  Current  Outpatient Medications   Medication Sig Dispense Refill     ACCU-CHEK EDINSON PLUS test strip USE TO TEST BLOOD SUGARS FOUR TIMES DAILY OR AS DIRECTED 150 strip 11     acetaminophen (TYLENOL) 325 MG tablet Take 2 tablets (650 mg) by mouth every 4 hours as needed for mild pain (Patient not taking: Reported on 2/5/2019)       Artificial Tear Ointment (REFRESH LACRI-LUBE) OINT Apply 1 Application to eye At Bedtime 1 Tube 3     Artificial Tear Solution (SM ARTIFICIAL TEARS) SOLN Place 1 drop into the right eye every hour as needed Apply at least 4 times daily and as needed for dry eye 1 Bottle 3     aspirin (ASPIRIN LOW DOSE) 81 MG EC tablet Take 1 tablet (81 mg) by mouth daily 90 tablet 2     BD VIKTORIA U/F 32G X 4 MM insulin pen needle Use 5 per day 300 each 3     blood glucose monitoring (ACCU-CHEK EDINSON PLUS) test strip USE TO TEST BLOOD SUGARS FOUR TIMES DAILY OR AS DIRECTED 400 strip 3     blood glucose monitoring (ACCU-CHEK EDINSON PLUS) test strip USE TO TEST BLOOD SUGARS FOUR TIMES DAILY OR AS DIRECTED 300 strip 3     blood glucose monitoring (ACCU-CHEK EDINSON PLUS) test strip Use to test blood sugar 4 times daily 400 each 3     blood glucose monitoring (ACCU-CHEK FASTCLIX) lancets Use to test blood sugar 4 times daily or as directed.  1 box = 102 lancets 408 each 3     capsaicin (ZOSTRIX) 0.025 % CREA cream To feet 2-3 times daily as needed. 60 g 6     carvedilol (COREG) 12.5 MG tablet TAKE 1 TABLET(12.5 MG) BY MOUTH TWICE DAILY WITH MEALS 180 tablet 3     Cholecalciferol (VITAMIN D-3 PO) Take 2,000 Units by mouth every morning        cyanocobalamin (RA VITAMIN B-12 TR) 1000 MCG TBCR Take 1,000 mcg by mouth every morning        dapagliflozin (FARXIGA) 10 MG TABS tablet Take 1 tablet (10 mg) by mouth daily 90 tablet 3     econazole nitrate 1 % external cream APPLY TOPICALLY DAILY TO FEET AND TOENAILS 85 g 0     erythromycin (ROMYCIN) ophthalmic ointment Place into the right eye 3 times daily (Patient not taking:  Reported on 2/5/2019) 1 Tube 1     ibuprofen (ADVIL/MOTRIN) 600 MG tablet Take 1 tablet (600 mg) by mouth every 6 hours as needed for moderate pain       insulin degludec (TRESIBA) 200 UNIT/ML pen Take 100 units daily. 100 mL 3     IRON PO Take by mouth daily       lactulose (CHRONULAC) 10 GM/15ML solution Take 45 mLs (30 g) by mouth 4 times daily 5000 mL 11     Multiple Vitamin (THERAVITE PO) Take 1 tablet by mouth every morning        NOVOLOG FLEXPEN 100 UNIT/ML soln INJECT 1 UNIT PER 4 GRAMS OF CARBS AT MEALS AND SNACKS. CORRECTION SCALE OF 1 UNITS PER 25 OVER [address]. AVE DOSE 75 UNITERS PER DAY 30 mL 3     OLANZapine (ZYPREXA) 2.5 MG tablet Take 1 tablet (2.5 mg) by mouth At Bedtime 90 tablet 1     omeprazole (PRILOSEC) 40 MG capsule Take 1 capsule (40 mg) by mouth daily 90 capsule 2     potassium chloride SA (K-DUR/KLOR-CON M) 20 MEQ CR tablet Take 1 tablet (20 mEq) by mouth daily 90 tablet 2     pravastatin (PRAVACHOL) 20 MG tablet Take 1 tablet (20 mg) by mouth daily 90 tablet 3     Propylene Glycol-Glycerin (ARTIFICIAL TEARS) 1-0.3 % SOLN Apply 1 drop to eye every 2 hours (while awake) 30 mL 11     rifaximin (XIFAXAN) 550 MG TABS tablet Take 1 tablet (550 mg) by mouth 2 times daily 60 tablet 11     sildenafil (REVATIO) 20 MG tablet Take 2-3  Tablets before activity by mouth 90 tablet 3     tadalafil (CIALIS) 20 MG tablet Take 1 tablet (20 mg) by mouth daily as needed (Patient not taking: Reported on 2/5/2019) 30 tablet 0     tamsulosin (FLOMAX) 0.4 MG capsule Take 1 capsule (0.4 mg) by mouth daily 90 capsule 3     UNABLE TO FIND 2 times daily ULTRA MALE ENHANCEMENT POTENCY       XIFAXAN 550 MG TABS tablet TAKE 1 TABLET(550 MG) BY MOUTH TWICE DAILY (Patient not taking: Reported on 2/5/2019) 60 tablet 3       PAST SURGICAL HISTORY:  Past Surgical History:   Procedure Laterality Date     COLONOSCOPY      2015     ESOPHAGOSCOPY, GASTROSCOPY, DUODENOSCOPY (EGD), COMBINED N/A 11/17/2016    Procedure: COMBINED  ESOPHAGOSCOPY, GASTROSCOPY, DUODENOSCOPY (EGD);  Surgeon: Santi Rosas MD;  Location: [facility]     ESOPHAGOSCOPY, GASTROSCOPY, DUODENOSCOPY (EGD), COMBINED N/A 2017    Procedure: COMBINED ESOPHAGOSCOPY, GASTROSCOPY, DUODENOSCOPY (EGD);  EGD;  Surgeon: Santi Rosas MD;  Location: [facility]     ESOPHAGOSCOPY, GASTROSCOPY, DUODENOSCOPY (EGD), COMBINED N/A 2018    Procedure: EGD;  Surgeon: Santi Rosas MD;  Location:  OR     HEAD & NECK SURGERY      2017 at [facility].      IMPLANT GOLD WEIGHT EYELID Right 2017    Procedure: IMPLANT WEIGHT EYELID;  Right Upper Eyelid Weight, right tarsal strip lower eyelid;  Surgeon: Milana Malave MD;  Location:  OR     IR CHEMO EMBOLIZATION  2019     KNEE SURGERY Left      ORTHOPEDIC SURGERY       PAROTIDECTOMY, RADICAL NECK DISSECTION Right 2017    Procedure: PAROTIDECTOMY, RADICAL NECK DISSECTION;  Right Superfacial Parotidectomy , Facial nerve repair. with [facility] facial nerve monitor.;  Surgeon: Asiya Morgan MD;  Location: [facility]     PICC INSERTION Left 2017    4fr SL BioFlo PICC, 44cm in the L basilic vein w/ tip in the low SVC     VASCULAR SURGERY         ALLERGIES:     Allergies   Allergen Reactions     Codeine Other (See Comments)     Cannot take due to liver  Cannot tolerate oral narcotics       FAMILY HISTORY:  Family History   Problem Relation Age of Onset     Prostate Cancer Maternal Grandfather      Substance Abuse Maternal Grandfather         Alcohol     Colon Cancer Father 60     Pancreatic Cancer Father 60     Prostate Cancer Father      Colorectal Cancer Father      Macular Degeneration Father      Cancer Father      Glaucoma Father      Colorectal Cancer Maternal Grandmother      Cancer Maternal Grandmother      Substance Abuse Maternal Grandmother         Alcohol     Colorectal Cancer Paternal Grandmother      Cancer Mother      Diabetes Mother          3/2016     Cerebrovascular Disease  Mother         Passed away in Feb of this year, 80 years old.     Thyroid Disease Mother      Depression Mother      Asthma Sister         Had since birth     Thyroid Disease Sister      Depression Sister      Liver Disease No family hx of    No family history of premature CAD or sudden death.    SOCIAL HISTORY:  Social History     Tobacco Use     Smoking status: Never Smoker     Smokeless tobacco: Former User     Types: Chew     Tobacco comment: 1 tin per week   Substance Use Topics     Alcohol use: No     Alcohol/week: 0.0 oz     Comment: quit Sept. 1996     Drug use: No       ROS:   A comprehensive 14 point review of systems is negative other than as mentioned in HPI.    Exam:  There were no vitals taken for this visit.  GENERAL APPEARANCE: healthy, alert and no distress  EYES: no icterus, no xanthelasmas  ENT: normal palate, mucosa moist, no central cyanosis  NECK: no adenopathy, no asymmetry, masses, or scars, thyroid normal to palpation and no bruits, JVP not elevated  RESPIRATORY: lungs clear to auscultation - no rales, rhonchi or wheezes, no use of accessory muscles, no retractions, respirations are unlabored, normal respiratory rate  CARDIOVASCULAR: regular rhythm, normal S1 with physiologic split S2, no S3 or S4 and no murmur, click or rub, precordium quiet with normal PMI.  GI: +softly distended, non tender, no masses palpable, bowel sounds normal, aorta not enlarged by palpation, no abdominal bruits  EXTREMITIES: peripheral pulses normal, no edema, no bruits  NEURO: alert and oriented to person/place/time, normal speech, gait and affect  VASC: Radial, dorsalis pedis and posterior tibialis pulses 2+ bilaterally.  SKIN: no ecchymoses, no rashes.  PSYCH: cooperative, affect appropriate.     Labs:  Reviewed.       Testing/Procedures:    I personally visualized and interpreted:  Dobutamine echocardiogram 2/4/19:  Non-diagnostic due to failure to achieve target HR. Max HR 66% MPHR.   Normal biventricular  size/function/thickness/wall motion at baseline, LVEF=60-65%.   With dobutamine, LVEF>75% and LV cavity size decreases appropriately. No regional wall motion abnormalities.   PA systolic pressure=17 mmHg+RA pressure (20-35 mmHg total, within normal limits.)       Assessment and Plan:   1. Preoperative cardiovascular evaluation:  2. Abnormal CV function study/Non-diagnostic stress echocardiogram  3. Liver transplant candidate  Based on echocardiographic findings, Mr. Workman has no evidence of portopulmonary hypertension.   Given non-diagnostic stress echocardiogram, plan to proceed with coronary CTA.  Further plan is pending the results of coronary CTA.   -continue beta blockers, antiarrhythmics, and statins on the morning of surgery  -hold diuretics and ACE inhibitors on the morning of surgery    The patient states understanding and is agreeable with plan.     Manjeet Ireland MD  Cardiology    CC  LETTY [facility]ERN[facility]NDEZ

## 2019-02-05 NOTE — PROGRESS NOTES
"Psychosocial Assessment for Liver Transplant Evaluation  Frandy Workman was seen in the Transplant Center as part of his evaluation as a potential liver transplant recipient.  He was accompanied by his lifelong friend Davi Saunders Jr.  Living Situation: Frandy Alonzo" is a [age] year old man who lives in [place], [place].  He lives in a townhome alone and is independent with his medications, personal cares and housekeeping.  He has lived at this residence since December of 2017.  He denies any fall history.   Education/Employment:  Miller graduated high school and obtained a bachelor's degree in science and business administration.  He has not worked since July 5, 2014 because of his liver disease.  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    Financial /Income: Miller receives both long-term disability benefits through his last employer and Social Security.  Health Insurance:  Medicare and AARP.  I provided specific education about Medicare Part B coverage for immunosuppression medications.  This writer talked with Miller and his friend Davi about the financial risks of transplant, particularly about the high cost of transplant related medications and the importance of maintaining adequate health insurance coverage.  Family/Social Support: Miller has been  since 2009.  His strongest supports are his two close lifelong friends, Davi Saunders and Willie Rubio, who both live in [place].  Miller has named both of these friends in his health care directive.  Davi is the primary agent.  Davi is present today and demonstrating his support of Miller.  Davi plans to fly to [place] at the time of transplantation to assist with medical decision-making and care giving.  Another friend, Alley HOPKINS, will also come from [place] to assist with Miller's recovery.      Miller's relationships with his immediate family are very strained.  He moved to [place] from " "[place] over two years ago to be closer to his daughter Negra.  She was brought to [place] when patient was very sick back in .  Negra is now [age] years of age and she lives with Miller's sister Kelsi in [place], [place].  Miller has not had any contact with her since 2016.  He also has very little contact with his sister Kelsi.  Miller also has a brother Mohamud who lives in [place] but Miller does not have contact with him.  Miller's mother is .  His father lives in [place], [place].    This writer stressed the importance of having a stable and involved support network before and after transplant.  Provided Miller and Art with education about the relationship between a stable support system and better surgical and post-transplant outcomes compared to patients with a limited support system.    Functional Status: Miller is independent with ambulation.  He denies any recent falls.  He drives.  Chemical Dependency:  Miller quit chewing tobacco in May of 2018.  He denies any history of illicit drug use or pain medication abuse.  He denies any alcohol consumption since 1996.  Miller denies any history of heavy alcohol consumption, or chemical dependency treatment history.    Mental Health: Miller has been prescribed Zyprexa as a \"sleep agent\" since .  He denies any current suicidal ideation, past suicide plans or attempts, or hospitalization for mental health treatment.  Of note, medical records from [facility] document a diagnosis of  Bipolar Disorder, in remission.   PHQ-9 score today: 6  LIZZETTE-7 score today: 4  Adjustment to Illness:  Miller has ESTRADA cirrhosis and hepatocellular carcinoma.  He was listed for a liver transplant in [place] around  but his condition improved and he was de-listed.   Miller reports having low energy, weakness and difficulty sleeping.  His hepatic encephalopathy is controlled with medications.  Miller is very agreeable to having a liver transplant.  This " writer provided Miller and Davi with supportive counseling throughout this interview.  This writer also encouraged Miller and Davi to attend the liver transplant support group for additional support and encouragement.   Impression/Recommendations:   Miller and his friend Davi verbalize understanding the psychosocial risks of transplant and teaching provided during this evaluation.  Miller has a very limited support system in [place].  He says he can rely on neighbors for transportation when he cannot drive.  A neighbor picked him up from the hospital just last week.  He has identified care givers for post transplant care.  His friends Davi and Alley would come from [place] for a number of weeks to provide care giving for Miller.  Davi is prepared to come at a moments notice.  Miller has adequate finances and health insurance for transplant.  There are no chemical or mental health concerns.  Miller is an acceptable transplant candidate from a psychosocial perspective.  This writer will remain available to assist patient throughout the evaluation process and will follow patient through transplant if he is listed.  It was a pleasure to evaluate this patient for liver transplant.   Teaching completed during assessment:  1.     Housing and relocation needs post transplant.  2.     Caregiver needs post transplant.  3.     Financial issues related to transplant.  4.     Risks of alcohol use post transplant.  5.     Common psychosocial stressors pre/post transplant.          6.     Liver Transplant support group availability.          7.     [facility] Directive-form and education provided             Psychosocial Risks of Transplant Reviewed:  1.     Increased stress related to your emotional, family, social, employment, or   financial situation.  2.     Affect on work and/or disability benefits.  3.     Affect on future health and life insurance.  4.     Transplant outcome expectations may not be met.  5.     Mental Health risks:  anxiety, depression, PTSD, guilt, grief and chronic fatigue.     Dilan Pratt, [facility] Health CareSW

## 2019-02-07 ENCOUNTER — TELEPHONE (OUTPATIENT)
Dept: TRANSPLANT | Facility: CLINIC | Age: 54
End: 2019-02-07

## 2019-02-07 NOTE — TELEPHONE ENCOUNTER
Spoke with the patient and confirmed derm appointments on 2/25   Informed patient an [facility] can be [facility]

## 2019-02-11 ENCOUNTER — OFFICE VISIT (OUTPATIENT)
Dept: ENDOCRINOLOGY | Facility: CLINIC | Age: 54
End: 2019-02-11
Payer: MEDICARE

## 2019-02-11 VITALS
HEIGHT: 69 IN | SYSTOLIC BLOOD PRESSURE: 118 MMHG | HEART RATE: 71 BPM | WEIGHT: 190.6 LBS | BODY MASS INDEX: 28.23 KG/M2 | DIASTOLIC BLOOD PRESSURE: 67 MMHG

## 2019-02-11 DIAGNOSIS — E11.3293 TYPE 2 DIABETES MELLITUS WITH MILD NONPROLIFERATIVE RETINOPATHY OF BOTH EYES WITHOUT MACULAR EDEMA, UNSPECIFIED WHETHER LONG TERM INSULIN USE (H): Primary | ICD-10-CM

## 2019-02-11 PROBLEM — R93.1 EQUIVOCAL STRESS ECHOCARDIOGRAM: Chronic | Status: ACTIVE | Noted: 2019-02-05

## 2019-02-11 LAB — HBA1C MFR BLD: 6.1 % (ref 4.3–6)

## 2019-02-11 ASSESSMENT — PAIN SCALES - GENERAL: PAINLEVEL: NO PAIN (0)

## 2019-02-11 ASSESSMENT — MIFFLIN-ST. JEOR: SCORE: 1689.94

## 2019-02-11 NOTE — PATIENT INSTRUCTIONS
Reduce Tresiba to 90 units daily.   Continue Novolog.   If fasting blood sugars are less than 100 for next 3-4 days, reduce Tresiba further to 80 units.

## 2019-02-11 NOTE — PROGRESS NOTES
Endocrine clinic visit    Interval history  Has been doing well.  Has been checking his blood sugars very regularly.    Average blood sugar 148, standard 65, range 57-3 02.  Average blood sugar checks 2.3 times a day  Fasting blood sugars are usually low 100s.  He checks his blood sugars before dinner and predinner blood sugars are often below 70    He is not having typical symptoms of hypoglycemia.  He usually feels very tired and that is when he checks.    His main concern today was erectile dysfunction. Sildenafil working well.     Working with transplant team, has recently been moved up in the transplant list.      Assessment/Treatment Plan:      Miller is a 53 year old male here for a follow up.    1. Type 2 Diabetes since early 30s with hypoglycemia and neuropathy.   A1c 6.1 due to poor appetite, poor oral intake.    Regimen  Tresiba 90 units daily, reduce further 80 units if blood sugars are below 100 fasting consistently.  Novolog 1 unit per 8 gm carb [at present 15] plus 1 unit per 30 over 120(start at meals again)   Farxiga 10 mg daily   Take orange juice for hypoglycemia    Benjamin Flash -- he does all the dose calculations from the present meter and he does not want to change it.      Barriers: Stress eating, reduced due to poor appetite.   Lack of testing and bolusing.  He is doing more testing now.  BG testing 3 times a day, with meal time and correction insulin     Diabetic neuropathy  Alpha lipoic acid 600 mg daily.     Erectile dysfunction  Sildenafil PRN    Hyperlipidemia   Pravastatin 20 mg daily   Check LDL on follow up.     Jennifer Wilcox MD  [id number]  [facility]        =================================================    Endocrinology Clinic Visit    Chief Complaint: Follow-up visit for DM     HPI:  .54 year old male with history of ESTRADA with liver cirrhosis who is seen in follow up for T2DM - uncontrolled.     He has had diabetes since 2001. He was intially started on Metformin but after  diagnosis of liver failure, this was discontinued.     Neuropathy - tingling in feet bilateral - worse since last visit.   Nephropathy - none  Retinopathy - no.  He will be seeing a retina specialist for a retinal hemorrhage on the left eye.  Hypoglycemia - none    Prevention  On Statin.   No change in Bowel habits. On lactulose   Appetite unchanged.   Weight gain  No neck pain or difficulty swallowing.     Barriers:   Hypoglycemia d/t poor appetite, BID meals and pre dinner values are often low.   A1c is not a reliable indicator of glycemic control: Anemia, does not correlate with BG levels.      Insulin regimen  Tresiba 100 units daily  Aspart 1 units per 10 gm CHO not doing regularly  Correction unclear what he is following. [? 50 mg/dl over 100]      Allergies   Allergen Reactions     Codeine Other (See Comments)     Cannot take due to liver  Cannot tolerate oral narcotics       Current Outpatient Medications   Medication Sig Dispense Refill     ACCU-CHEK EDINSON PLUS test strip USE TO TEST BLOOD SUGARS FOUR TIMES DAILY OR AS DIRECTED 150 strip 11     acetaminophen (TYLENOL) 325 MG tablet Take 2 tablets (650 mg) by mouth every 4 hours as needed for mild pain       Artificial Tear Ointment (REFRESH LACRI-LUBE) OINT Apply 1 Application to eye At Bedtime 1 Tube 3     Artificial Tear Solution (SM ARTIFICIAL TEARS) SOLN Place 1 drop into the right eye every hour as needed Apply at least 4 times daily and as needed for dry eye 1 Bottle 3     aspirin (ASPIRIN LOW DOSE) 81 MG EC tablet Take 1 tablet (81 mg) by mouth daily 90 tablet 2     BD VIKTORIA U/F 32G X 4 MM insulin pen needle Use 5 per day 300 each 3     blood glucose monitoring (ACCU-CHEK EDINSON PLUS) test strip USE TO TEST BLOOD SUGARS FOUR TIMES DAILY OR AS DIRECTED 400 strip 3     blood glucose monitoring (ACCU-CHEK EDINSON PLUS) test strip USE TO TEST BLOOD SUGARS FOUR TIMES DAILY OR AS DIRECTED 300 strip 3     blood glucose monitoring (ACCU-CHEK EDINSON PLUS) test  strip Use to test blood sugar 4 times daily 400 each 3     blood glucose monitoring (ACCU-CHEK FASTCLIX) lancets Use to test blood sugar 4 times daily or as directed.  1 box = 102 lancets 408 each 3     capsaicin (ZOSTRIX) 0.025 % CREA cream To feet 2-3 times daily as needed. 60 g 6     carvedilol (COREG) 12.5 MG tablet TAKE 1 TABLET(12.5 MG) BY MOUTH TWICE DAILY WITH MEALS 180 tablet 3     Cholecalciferol (VITAMIN D-3 PO) Take 2,000 Units by mouth every morning        cyanocobalamin (RA VITAMIN B-12 TR) 1000 MCG TBCR Take 1,000 mcg by mouth every morning        dapagliflozin (FARXIGA) 10 MG TABS tablet Take 1 tablet (10 mg) by mouth daily 90 tablet 3     econazole nitrate 1 % external cream APPLY TOPICALLY DAILY TO FEET AND TOENAILS 85 g 0     erythromycin (ROMYCIN) ophthalmic ointment Place into the right eye 3 times daily (Patient not taking: Reported on 2/5/2019) 1 Tube 1     ibuprofen (ADVIL/MOTRIN) 600 MG tablet Take 1 tablet (600 mg) by mouth every 6 hours as needed for moderate pain       insulin degludec (TRESIBA) 200 UNIT/ML pen Take 100 units daily. 100 mL 3     IRON PO Take by mouth daily       lactulose (CHRONULAC) 10 GM/15ML solution Take 45 mLs (30 g) by mouth 4 times daily 5000 mL 11     Multiple Vitamin (THERAVITE PO) Take 1 tablet by mouth every morning        NOVOLOG FLEXPEN 100 UNIT/ML soln INJECT 1 UNIT PER 4 GRAMS OF CARBS AT MEALS AND SNACKS. CORRECTION SCALE OF 1 UNITS PER 25 OVER 125. AVE DOSE 75 UNITERS PER DAY 30 mL 3     OLANZapine (ZYPREXA) 2.5 MG tablet Take 1 tablet (2.5 mg) by mouth At Bedtime 90 tablet 1     omeprazole (PRILOSEC) 40 MG capsule Take 1 capsule (40 mg) by mouth daily 90 capsule 2     potassium chloride SA (K-DUR/KLOR-CON M) 20 MEQ CR tablet Take 1 tablet (20 mEq) by mouth daily 90 tablet 2     pravastatin (PRAVACHOL) 20 MG tablet Take 1 tablet (20 mg) by mouth daily 90 tablet 3     Propylene Glycol-Glycerin (ARTIFICIAL TEARS) 1-0.3 % SOLN Apply 1 drop to eye every 2  hours (while awake) (Patient not taking: Reported on 2/5/2019) 30 mL 11     rifaximin (XIFAXAN) 550 MG TABS tablet Take 1 tablet (550 mg) by mouth 2 times daily 60 tablet 11     sildenafil (REVATIO) 20 MG tablet Take 2-3  Tablets before activity by mouth 90 tablet 3     tadalafil (CIALIS) 20 MG tablet Take 1 tablet (20 mg) by mouth daily as needed (Patient not taking: Reported on 2/5/2019) 30 tablet 0     tamsulosin (FLOMAX) 0.4 MG capsule Take 1 capsule (0.4 mg) by mouth daily 90 capsule 3     UNABLE TO FIND 2 times daily ULTRA MALE ENHANCEMENT POTENCY       XIFAXAN 550 MG TABS tablet TAKE 1 TABLET(550 MG) BY MOUTH TWICE DAILY (Patient not taking: Reported on 2/5/2019) 60 tablet 3       Review of Systems   No eye symptoms  No headache, change in vision, loss of peripheral vision  No neck pain, no other lumps in the neck  No change in breathing    No change in swallowing.    No swelling in extremities  No change in mental status.    ROS that were obtained were negative.         Past Medical History:   Diagnosis Date     Anemia 2013    Low blood plates current is 37     Arthritis      BPH (benign prostatic hyperplasia)      Cholelithiasis      Conductive hearing loss 8/16/2017    Have a lump on my right side of my face.  Had wax discharge     Depressive disorder 1986    Suffer effects throughout life     Gastroesophageal reflux disease 12/1/2014    Being treated with Prilosac     HCC (hepatocellular carcinoma) (H) 1/22/2019     Hepatitis 2014    Diagnosed with schrosis ESTRADA in 2014.  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        Past Surgical History:   Procedure Laterality Date     COLONOSCOPY      2015     ESOPHAGOSCOPY, GASTROSCOPY, DUODENOSCOPY (EGD), COMBINED N/A 11/17/2016    Procedure: COMBINED ESOPHAGOSCOPY, GASTROSCOPY, DUODENOSCOPY (EGD);   Surgeon: Santi Rosas MD;  Location: [facility]     ESOPHAGOSCOPY, GASTROSCOPY, DUODENOSCOPY (EGD), COMBINED N/A 2017    Procedure: COMBINED ESOPHAGOSCOPY, GASTROSCOPY, DUODENOSCOPY (EGD);  EGD;  Surgeon: Santi Rosas MD;  Location: [facility]     ESOPHAGOSCOPY, GASTROSCOPY, DUODENOSCOPY (EGD), COMBINED N/A 2018    Procedure: EGD;  Surgeon: Santi Rosas MD;  Location:  OR     HEAD & NECK SURGERY      2017 at [facility].      IMPLANT GOLD WEIGHT EYELID Right 2017    Procedure: IMPLANT WEIGHT EYELID;  Right Upper Eyelid Weight, right tarsal strip lower eyelid;  Surgeon: Milana Malave MD;  Location:  OR     IR CHEMO EMBOLIZATION  2019     KNEE SURGERY Left      ORTHOPEDIC SURGERY       PAROTIDECTOMY, RADICAL NECK DISSECTION Right 2017    Procedure: PAROTIDECTOMY, RADICAL NECK DISSECTION;  Right Superfacial Parotidectomy , Facial nerve repair. with [facility] facial nerve monitor.;  Surgeon: Asiya Morgan MD;  Location: [facility]     PICC INSERTION Left 2017    4fr SL BioFlo PICC, 44cm in the L basilic vein w/ tip in the low SVC     VASCULAR SURGERY         Family History   Problem Relation Age of Onset     Prostate Cancer Maternal Grandfather      Substance Abuse Maternal Grandfather         Alcohol     Colon Cancer Father 60     Pancreatic Cancer Father 60     Prostate Cancer Father      Colorectal Cancer Father      Macular Degeneration Father      Cancer Father      Glaucoma Father      Colorectal Cancer Maternal Grandmother      Cancer Maternal Grandmother      Substance Abuse Maternal Grandmother         Alcohol     Colorectal Cancer Paternal Grandmother      Cancer Mother      Diabetes Mother          3/2016     Cerebrovascular Disease Mother         Passed away in Feb of this year, 80 years old.     Thyroid Disease Mother      Depression Mother      Asthma Sister         Had since birth     Thyroid Disease Sister      Depression Sister      Liver  "Disease No family hx of      Social History     Socioeconomic History     Marital status:      Spouse name: Not on file     Number of children: Not on file     Years of education: Not on file     Highest education level: Not on file   Social Needs     Financial resource strain: Not on file     Food insecurity - worry: Not on file     Food insecurity - inability: Not on file     Transportation needs - medical: Not on file     Transportation needs - non-medical: Not on file   Occupational History     Not on file   Tobacco Use     Smoking status: Never Smoker     Smokeless tobacco: Former User     Types: Chew     Tobacco comment: 1 tin per week   Substance and Sexual Activity     Alcohol use: No     Alcohol/week: 0.0 oz     Comment: quit Sept. 1996     Drug use: No     Sexual activity: Not Currently     Partners: Female     Birth control/protection: Condom   Other Topics Concern     Parent/sibling w/ CABG, MI or angioplasty before 65F 55M? Yes   Social History Narrative     Not on file           Objective:   /67   Pulse 71   Ht 1.753 m (5' 9\")   Wt 86.5 kg (190 lb 9.6 oz)   BMI 28.15 kg/m      Constitutional: no distress.   EYES: anicteric.   HEENT:  Assymmetry in parotid region, muscle wasting.   Cardiovascular: RRR, S1, S2 normal. No m/g/r   Pulmonary/Chest: CTAB. No wheezing or rales   Abdominal: +BS. Distended.   Neurological: Alert.  Extremities: No clubbing, cyanosis or inflammation   Skin: normal texture, color  Feet: Tingling +. Sensation is intact.  Due April 2019  Lymphatic: no cervical lymphadenopathy.  Psychological: appropriate mood     In House Labs:   Lab Results   Component Value Date    A1C 6.5 06/09/2017    A1C 7.8 10/25/2016          TSH   Date Value Ref Range Status   08/08/2018 0.49 0.40 - 4.00 mU/L Final   02/08/2018 0.71 0.40 - 4.00 mU/L Final   06/09/2017 0.42 0.40 - 4.00 mU/L Final     T4 Free   Date Value Ref Range Status   08/08/2018 1.21 0.76 - 1.46 ng/dL Final "       Creatinine   Date Value Ref Range Status   02/04/2019 0.96 0.66 - 1.25 mg/dL Final   ]    Recent Labs   Lab Test  10/25/16   1731   CHOL  164   HDL  33*   LDL  90   TRIG  205*       Labs pending.     A1c was 6.6, ALT normal, no proteinuria, LDL 68, normal thyroid function, slightly low hematocrit than prior values.      Answers for HPI/ROS submitted by the patient on 2/2/2019   Loss of appetite: Yes  Weight gain: Yes  Fatigue: Yes  Feeling hot or cold when others believe the temperature is normal: Yes  Post-operative complications: Yes  Surgical site pain: Yes  Itching: Yes  Acne: Yes  Flaking of skin: Yes  Difficulty breating or shortness of breath: Yes  Nighttime Cough: Yes  Back pain: Yes  Neck pain: Yes  Anemia: Yes

## 2019-02-11 NOTE — LETTER
2/11/2019       RE: Frandy Workman  [address]  [facility] [address]     Dear Colleague,    Thank you for referring your patient, Frandy Workman, to the [facility] ENDOCRINOLOGY at [facility]. Please see a copy of my visit note below.      Endocrine clinic visit    Interval history  Has been doing well.  Has been checking his blood sugars very regularly.    Average blood sugar 148, standard 65, range 57-3 02.  Average blood sugar checks 2.3 times a day  Fasting blood sugars are usually low 100s.  He checks his blood sugars before dinner and predinner blood sugars are often below 70    He is not having typical symptoms of hypoglycemia.  He usually feels very tired and that is when he checks.    His main concern today was erectile dysfunction. Sildenafil working well.     Working with transplant team, has recently been moved up in the transplant list.      Assessment/Treatment Plan:      Miller is a 53 year old male here for a follow up.    1. Type 2 Diabetes since early 30s with hypoglycemia and neuropathy.   A1c 6.1 due to poor appetite, poor oral intake.    Regimen  Tresiba 90 units daily, reduce further 80 units if blood sugars are below 100 fasting consistently.  Novolog 1 unit per 8 gm carb [at present 15] plus 1 unit per 30 over 120(start at meals again)   Farxiga 10 mg daily   Take orange juice for hypoglycemia    Benjamin Flash -- he does all the dose calculations from the present meter and he does not want to change it.      Barriers: Stress eating, reduced due to poor appetite.   Lack of testing and bolusing.  He is doing more testing now.  BG testing 3 times a day, with meal time and correction insulin     Diabetic neuropathy  Alpha lipoic acid 600 mg daily.     Erectile dysfunction  Sildenafil PRN    Hyperlipidemia   Pravastatin 20 mg daily   Check LDL on follow up.     Jennifer Wilcox MD  [id number]  Endocrinology  Service        =================================================    Endocrinology Clinic Visit    Chief Complaint: Follow-up visit for DM     HPI:  .54 year old male with history of ESTRADA with liver cirrhosis who is seen in follow up for T2DM - uncontrolled.     He has had diabetes since 2001. He was intially started on Metformin but after diagnosis of liver failure, this was discontinued.     Neuropathy - tingling in feet bilateral - worse since last visit.   Nephropathy - none  Retinopathy - no.  He will be seeing a retina specialist for a retinal hemorrhage on the left eye.  Hypoglycemia - none    Prevention  On Statin.   No change in Bowel habits. On lactulose   Appetite unchanged.   Weight gain  No neck pain or difficulty swallowing.     Barriers:   Hypoglycemia d/t poor appetite, BID meals and pre dinner values are often low.   A1c is not a reliable indicator of glycemic control: Anemia, does not correlate with BG levels.      Insulin regimen  Tresiba 100 units daily  Aspart 1 units per 10 gm CHO not doing regularly  Correction unclear what he is following. [? 50 mg/dl over 100]      Allergies   Allergen Reactions     Codeine Other (See Comments)     Cannot take due to liver  Cannot tolerate oral narcotics       Current Outpatient Medications   Medication Sig Dispense Refill     ACCU-CHEK EDINSON PLUS test strip USE TO TEST BLOOD SUGARS FOUR TIMES DAILY OR AS DIRECTED 150 strip 11     acetaminophen (TYLENOL) 325 MG tablet Take 2 tablets (650 mg) by mouth every 4 hours as needed for mild pain       Artificial Tear Ointment (REFRESH LACRI-LUBE) OINT Apply 1 Application to eye At Bedtime 1 Tube 3     Artificial Tear Solution (SM ARTIFICIAL TEARS) SOLN Place 1 drop into the right eye every hour as needed Apply at least 4 times daily and as needed for dry eye 1 Bottle 3     aspirin (ASPIRIN LOW DOSE) 81 MG EC tablet Take 1 tablet (81 mg) by mouth daily 90 tablet 2     BD VIKTORIA U/F 32G X 4 MM insulin pen needle Use 5  per day 300 each 3     blood glucose monitoring (ACCU-CHEK EDINSON PLUS) test strip USE TO TEST BLOOD SUGARS FOUR TIMES DAILY OR AS DIRECTED 400 strip 3     blood glucose monitoring (ACCU-CHEK EDINSON PLUS) test strip USE TO TEST BLOOD SUGARS FOUR TIMES DAILY OR AS DIRECTED 300 strip 3     blood glucose monitoring (ACCU-CHEK EDINSON PLUS) test strip Use to test blood sugar 4 times daily 400 each 3     blood glucose monitoring (ACCU-CHEK FASTCLIX) lancets Use to test blood sugar 4 times daily or as directed.  1 box = 102 lancets 408 each 3     capsaicin (ZOSTRIX) 0.025 % CREA cream To feet 2-3 times daily as needed. 60 g 6     carvedilol (COREG) 12.5 MG tablet TAKE 1 TABLET(12.5 MG) BY MOUTH TWICE DAILY WITH MEALS 180 tablet 3     Cholecalciferol (VITAMIN D-3 PO) Take 2,000 Units by mouth every morning        cyanocobalamin (RA VITAMIN B-12 TR) 1000 MCG TBCR Take 1,000 mcg by mouth every morning        dapagliflozin (FARXIGA) 10 MG TABS tablet Take 1 tablet (10 mg) by mouth daily 90 tablet 3     econazole nitrate 1 % external cream APPLY TOPICALLY DAILY TO FEET AND TOENAILS 85 g 0     erythromycin (ROMYCIN) ophthalmic ointment Place into the right eye 3 times daily (Patient not taking: Reported on 2/5/2019) 1 Tube 1     ibuprofen (ADVIL/MOTRIN) 600 MG tablet Take 1 tablet (600 mg) by mouth every 6 hours as needed for moderate pain       insulin degludec (TRESIBA) 200 UNIT/ML pen Take 100 units daily. 100 mL 3     IRON PO Take by mouth daily       lactulose (CHRONULAC) 10 GM/15ML solution Take 45 mLs (30 g) by mouth 4 times daily 5000 mL 11     Multiple Vitamin (THERAVITE PO) Take 1 tablet by mouth every morning        NOVOLOG FLEXPEN 100 UNIT/ML soln INJECT 1 UNIT PER 4 GRAMS OF CARBS AT MEALS AND SNACKS. CORRECTION SCALE OF 1 UNITS PER 25 OVER 125. AVE DOSE 75 UNITERS PER DAY 30 mL 3     OLANZapine (ZYPREXA) 2.5 MG tablet Take 1 tablet (2.5 mg) by mouth At Bedtime 90 tablet 1     omeprazole (PRILOSEC) 40 MG capsule Take 1  capsule (40 mg) by mouth daily 90 capsule 2     potassium chloride SA (K-DUR/KLOR-CON M) 20 MEQ CR tablet Take 1 tablet (20 mEq) by mouth daily 90 tablet 2     pravastatin (PRAVACHOL) 20 MG tablet Take 1 tablet (20 mg) by mouth daily 90 tablet 3     Propylene Glycol-Glycerin (ARTIFICIAL TEARS) 1-0.3 % SOLN Apply 1 drop to eye every 2 hours (while awake) (Patient not taking: Reported on 2/5/2019) 30 mL 11     rifaximin (XIFAXAN) 550 MG TABS tablet Take 1 tablet (550 mg) by mouth 2 times daily 60 tablet 11     sildenafil (REVATIO) 20 MG tablet Take 2-3  Tablets before activity by mouth 90 tablet 3     tadalafil (CIALIS) 20 MG tablet Take 1 tablet (20 mg) by mouth daily as needed (Patient not taking: Reported on 2/5/2019) 30 tablet 0     tamsulosin (FLOMAX) 0.4 MG capsule Take 1 capsule (0.4 mg) by mouth daily 90 capsule 3     UNABLE TO FIND 2 times daily ULTRA MALE ENHANCEMENT POTENCY       XIFAXAN 550 MG TABS tablet TAKE 1 TABLET(550 MG) BY MOUTH TWICE DAILY (Patient not taking: Reported on 2/5/2019) 60 tablet 3       Review of Systems   No eye symptoms  No headache, change in vision, loss of peripheral vision  No neck pain, no other lumps in the neck  No change in breathing    No change in swallowing.    No swelling in extremities  No change in mental status.    ROS that were obtained were negative.         Past Medical History:   Diagnosis Date     Anemia 2013    Low blood plates current is 37     Arthritis      BPH (benign prostatic hyperplasia)      Cholelithiasis      Conductive hearing loss 8/16/2017    Have a lump on my right side of my face.  Had wax discharge     Depressive disorder 1986    Suffer effects throughout life     Gastroesophageal reflux disease 12/1/2014    Being treated with Prilosac     HCC (hepatocellular carcinoma) (H) 1/22/2019     Hepatitis 2014    Diagnosed with schrosis ESTRADA in 2014.  Suffer from hepatatie     HTN (hypertension)      Hyperlipidemia      Liver cirrhosis secondary to ESTRADA  (H)      Thrombocytopenia (H)      Type II diabetes mellitus (H)     Insulin adminstered BID daily.        Past Surgical History:   Procedure Laterality Date     COLONOSCOPY      2015     ESOPHAGOSCOPY, GASTROSCOPY, DUODENOSCOPY (EGD), COMBINED N/A 11/17/2016    Procedure: COMBINED ESOPHAGOSCOPY, GASTROSCOPY, DUODENOSCOPY (EGD);  Surgeon: Santi Rosas MD;  Location: [facility]     ESOPHAGOSCOPY, GASTROSCOPY, DUODENOSCOPY (EGD), COMBINED N/A 11/17/2017    Procedure: COMBINED ESOPHAGOSCOPY, GASTROSCOPY, DUODENOSCOPY (EGD);  EGD;  Surgeon: Santi Rosas MD;  Location: [facility]     ESOPHAGOSCOPY, GASTROSCOPY, DUODENOSCOPY (EGD), COMBINED N/A 12/28/2018    Procedure: EGD;  Surgeon: Santi Rosas MD;  Location:  OR     HEAD & NECK SURGERY      12/2017 at [facility].      IMPLANT GOLD WEIGHT EYELID Right 11/16/2017    Procedure: IMPLANT WEIGHT EYELID;  Right Upper Eyelid Weight, right tarsal strip lower eyelid;  Surgeon: Milana Malave MD;  Location:  OR     IR CHEMO EMBOLIZATION  1/22/2019     KNEE SURGERY Left      ORTHOPEDIC SURGERY       PAROTIDECTOMY, RADICAL NECK DISSECTION Right 11/2/2017    Procedure: PAROTIDECTOMY, RADICAL NECK DISSECTION;  Right Superfacial Parotidectomy , Facial nerve repair. with [facility] facial nerve monitor.;  Surgeon: Asiya Morgan MD;  Location: [facility] OR     PICC INSERTION Left 11/06/2017    4fr SL BioFlo PICC, 44cm in the L basilic vein w/ tip in the low SVC     VASCULAR SURGERY         Family History   Problem Relation Age of Onset     Prostate Cancer Maternal Grandfather      Substance Abuse Maternal Grandfather         Alcohol     Colon Cancer Father 60     Pancreatic Cancer Father 60     Prostate Cancer Father      Colorectal Cancer Father      Macular Degeneration Father      Cancer Father      Glaucoma Father      Colorectal Cancer Maternal Grandmother      Cancer Maternal Grandmother      Substance Abuse Maternal Grandmother         Alcohol     Colorectal  "Cancer Paternal Grandmother      Cancer Mother      Diabetes Mother          3/2016     Cerebrovascular Disease Mother         Passed away in Feb of this year, 80 years old.     Thyroid Disease Mother      Depression Mother      Asthma Sister         Had since birth     Thyroid Disease Sister      Depression Sister      Liver Disease No family hx of      Social History     Socioeconomic History     Marital status:      Spouse name: Not on file     Number of children: Not on file     Years of education: Not on file     Highest education level: Not on file   Social Needs     Financial resource strain: Not on file     Food insecurity - worry: Not on file     Food insecurity - inability: Not on file     Transportation needs - medical: Not on file     Transportation needs - non-medical: Not on file   Occupational History     Not on file   Tobacco Use     Smoking status: Never Smoker     Smokeless tobacco: Former User     Types: Chew     Tobacco comment: 1 tin per week   Substance and Sexual Activity     Alcohol use: No     Alcohol/week: 0.0 oz     Comment: quit 1996     Drug use: No     Sexual activity: Not Currently     Partners: Female     Birth control/protection: Condom   Other Topics Concern     Parent/sibling w/ CABG, MI or angioplasty before 65F 55M? Yes   Social History Narrative     Not on file           Objective:   /67   Pulse 71   Ht 1.753 m (5' 9\")   Wt 86.5 kg (190 lb 9.6 oz)   BMI 28.15 kg/m       Constitutional: no distress.   EYES: anicteric.   HEENT:  Assymmetry in parotid region, muscle wasting.   Cardiovascular: RRR, S1, S2 normal. No m/g/r   Pulmonary/Chest: CTAB. No wheezing or rales   Abdominal: +BS. Distended.   Neurological: Alert.  Extremities: No clubbing, cyanosis or inflammation   Skin: normal texture, color  Feet: Tingling +. Sensation is intact.  Due 2019  Lymphatic: no cervical lymphadenopathy.  Psychological: appropriate mood     In House Labs:   Lab " Results   Component Value Date    A1C 6.5 06/09/2017    A1C 7.8 10/25/2016          TSH   Date Value Ref Range Status   08/08/2018 0.49 0.40 - 4.00 mU/L Final   02/08/2018 0.71 0.40 - 4.00 mU/L Final   06/09/2017 0.42 0.40 - 4.00 mU/L Final     T4 Free   Date Value Ref Range Status   08/08/2018 1.21 0.76 - 1.46 ng/dL Final       Creatinine   Date Value Ref Range Status   02/04/2019 0.96 0.66 - 1.25 mg/dL Final   ]    Recent Labs   Lab Test  10/25/16   1731   CHOL  164   HDL  33*   LDL  90   TRIG  205*       Labs pending.     A1c was 6.6, ALT normal, no proteinuria, LDL 68, normal thyroid function, slightly low hematocrit than prior values.    Again, thank you for allowing me to participate in the care of your patient.      Sincerely,    Jennifer Wilcox MD

## 2019-02-12 ENCOUNTER — CARE COORDINATION (OUTPATIENT)
Dept: CARDIOLOGY | Facility: CLINIC | Age: 54
End: 2019-02-12

## 2019-02-12 DIAGNOSIS — E11.3293 TYPE 2 DIABETES MELLITUS WITH MILD NONPROLIFERATIVE RETINOPATHY OF BOTH EYES WITHOUT MACULAR EDEMA, UNSPECIFIED WHETHER LONG TERM INSULIN USE (H): ICD-10-CM

## 2019-02-12 DIAGNOSIS — K74.69 OTHER CIRRHOSIS OF LIVER (H): ICD-10-CM

## 2019-02-12 DIAGNOSIS — R93.1 ABNORMAL FINDINGS DIAGNOSTIC IMAGING OF HEART AND CORONARY CIRCULATION: Primary | ICD-10-CM

## 2019-02-12 DIAGNOSIS — C22.0 HCC (HEPATOCELLULAR CARCINOMA) (H): ICD-10-CM

## 2019-02-12 RX ORDER — LIDOCAINE 40 MG/G
CREAM TOPICAL
Status: CANCELLED | OUTPATIENT
Start: 2019-02-12

## 2019-02-12 RX ORDER — SODIUM CHLORIDE 9 MG/ML
INJECTION, SOLUTION INTRAVENOUS CONTINUOUS
Status: CANCELLED | OUTPATIENT
Start: 2019-02-12

## 2019-02-12 NOTE — PROGRESS NOTES
Spoke to patient and discuss that SOT has recommended that he has a CORS done as opposed to a CTA. Patient is scheduled for the procedure 02/26/2019. Went over the pre-procedure instructions with patient and also sent a [email or web address] message of instructions. Patient states understanding and agrees to call with any questions or concerns.

## 2019-02-12 NOTE — PROGRESS NOTES
Date: 2/12/2019    Time of Call: 8:14 AM     Diagnosis:  Pre-liver evaluation; Abnormal findings on diagnostic imaging of heart and coronary circulation      [ VORB ] Ordering provider: Manjeet Ireland MD    Order: CORS     Order received by: Jodi Soriano LPN     Follow-up/additional notes:   SOT recommends patient having CORS as part of pre-liver evaluation.     From: Manjeet Ireland MD   Sent: 2/8/2019   6:46 PM   To: Pippa Silva RN, Jodi Soriano LPN,   Subject: RE: transplant evaluation                         Just cors, stress echocardiogram measured PA pressure accurately and showed a normal pressure.

## 2019-02-14 PROBLEM — I51.89 OTHER ILL-DEFINED HEART DISEASES: Status: ACTIVE | Noted: 2019-02-12

## 2019-02-15 NOTE — PROGRESS NOTES
Liver Transplant Evaluation/Teaching    TEACHING TOPICS: Evaluation Process, Evaluation Items, Diagnostic Studies, Consultation, Chemical Dependency Policy, CD Eval, Donor Source, Liver Allocation, MELD System, UNOS, Waiting List, Follow up while on transplant list, Follow up after transplantation, Infection and Rejection, Immunosuppression , Medication Teaching, Lab Recording after transplant, Laboratory Frequency after transplant , Consent for evaluation and One year survival rates  INSTRUCTIONAL MATERIAL USED/GIVEN: Liver Transplant Handbook, MELD Booklet, Donor Booklet, Web Sites Options, Verbal instructions, Multiple Listing Brochure , Consent for evaluation signed, One year survival rates and SRTR (Scientific Registry) Data  Person(s) involved in teaching: patient and lifelong friend  Asks Questions: YES  Eager to Learn: YES  Cooperative: YES  Receptive (willing/able to accept information): YES  Reason for the appointment, diagnosis and treatment plan:YES  Knowledge of proper use of medications and conditions for which they are ordered (with special attention to potential side effects or drug interactions): YES  Which situations necessitate calling provider and whom to contact:YES  Other: patient tried and then friend signed receipt of information and alcohol possibly. Patient with understanding and agreement of.  Teaching Concerns Addressed   Comments: ED for T 100.5 or > and s/s of GIB  Proper use and care of (medical equip, care aids, etc.):YES  Nutritional needs and diet plan: YES  Pain management techniques: YES  Wound Care: YES  How and/when to access community resources: YES  Patient is aware of and agrees to required commitment to post-op care and long term follow-up: YES  Patient has name and phone number of transplant coordinator.   Time Spent face-to-face teachin minutes.

## 2019-02-19 ENCOUNTER — TELEPHONE (OUTPATIENT)
Dept: GASTROENTEROLOGY | Facility: CLINIC | Age: 54
End: 2019-02-19

## 2019-02-19 DIAGNOSIS — K76.82 HEPATIC ENCEPHALOPATHY (H): ICD-10-CM

## 2019-02-19 NOTE — TELEPHONE ENCOUNTER
Prior Authorization Specialty Medication Request    Medication/Dose: Xifaxan 550 mg tab  ICD code (if different than what is on RX):    Previously Tried and Failed:  Lactulose    Important Lab Values: Albumin 2.7, Total bilitruin 1.7   Rationale: Xifaxan helps to lower the toxin build up in the blood while lactulose works by cleansing the toxin build up in the liver. Adjunctive therapy.      Insurance Name:   Insurance ID:   Insurance Phone Number:     Pharmacy Information (if different than what is on RX)  Name:    Phone:

## 2019-02-22 ENCOUNTER — ANCILLARY PROCEDURE (OUTPATIENT)
Dept: MRI IMAGING | Facility: CLINIC | Age: 54
End: 2019-02-22
Payer: MEDICARE

## 2019-02-22 ENCOUNTER — APPOINTMENT (OUTPATIENT)
Dept: LAB | Facility: CLINIC | Age: 54
End: 2019-02-22
Payer: MEDICARE

## 2019-02-22 DIAGNOSIS — C22.0 HCC (HEPATOCELLULAR CARCINOMA) (H): ICD-10-CM

## 2019-02-22 RX ORDER — GADOBUTROL 604.72 MG/ML
7.5 INJECTION INTRAVENOUS ONCE
Status: COMPLETED | OUTPATIENT
Start: 2019-02-22 | End: 2019-02-22

## 2019-02-22 RX ADMIN — GADOBUTROL 7.5 ML: 604.72 INJECTION INTRAVENOUS at 08:28

## 2019-02-22 NOTE — DISCHARGE INSTRUCTIONS
MRI Contrast Discharge Instructions    The IV contrast you received today will pass out of your body in your  urine. This will happen in the next 24 hours. You will not feel this process.  Your urine will not change color.    Drink at least 4 extra glasses of water or juice today (unless your doctor  has restricted your fluids). This reduces the stress on your kidneys.  You may take your regular medicines.    If you are on dialysis: It is best to have dialysis today.    If you have a reaction: Most reactions happen right away. If you have  any new symptoms after leaving the hospital (such as hives or swelling),  call your hospital at the correct number below. Or call your family doctor.  If you have breathing distress or wheezing, call 911.    Special instructions: ***    I have read and understand the above information.    Signature:______________________________________ Date:___________    Staff:__________________________________________ Date:___________     Time:__________    [facility] Departments:    ___[place]: [phone]  ___[facility]: [phone]  ___[place]: [phone] ___[facility]: [phone]  ___[facility]: [phone]  ___[facility]: [phone]  ___[phone]  ___[facility]: [phone]  ___[phone]

## 2019-02-22 NOTE — TELEPHONE ENCOUNTER
Central Prior Authorization Team   Phone: [phone]      PA Initiation    Medication: Xifaxan 550 mg tab- PA Initiated  Insurance Company: [employer] - Phone [phone] Fax [phone]  Pharmacy Filling the Rx: [facility] [address] [place] [address] - [address] [place] [address]  Filling Pharmacy Phone: [phone]  Filling Pharmacy Fax: [phone]  Start Date: 2/22/2019

## 2019-02-25 ENCOUNTER — OFFICE VISIT (OUTPATIENT)
Dept: VASCULAR SURGERY | Facility: CLINIC | Age: 54
End: 2019-02-25
Payer: MEDICARE

## 2019-02-25 ENCOUNTER — OFFICE VISIT (OUTPATIENT)
Dept: DERMATOLOGY | Facility: CLINIC | Age: 54
End: 2019-02-25
Payer: MEDICARE

## 2019-02-25 VITALS — DIASTOLIC BLOOD PRESSURE: 64 MMHG | SYSTOLIC BLOOD PRESSURE: 110 MMHG | HEART RATE: 67 BPM

## 2019-02-25 DIAGNOSIS — C22.0 HEPATOCELLULAR CARCINOMA (H): Primary | ICD-10-CM

## 2019-02-25 DIAGNOSIS — L73.9 FOLLICULITIS: ICD-10-CM

## 2019-02-25 DIAGNOSIS — C22.0 HCC (HEPATOCELLULAR CARCINOMA) (H): Primary | ICD-10-CM

## 2019-02-25 DIAGNOSIS — D48.5 NEOPLASM OF UNCERTAIN BEHAVIOR OF SKIN: Primary | ICD-10-CM

## 2019-02-25 PROCEDURE — 88305 TISSUE EXAM BY PATHOLOGIST: CPT | Mod: TC | Performed by: DERMATOLOGY

## 2019-02-25 RX ORDER — CLINDAMYCIN PHOSPHATE 10 UG/ML
LOTION TOPICAL 2 TIMES DAILY
Qty: 60 ML | Refills: 3 | Status: SHIPPED | OUTPATIENT
Start: 2019-02-25 | End: 2019-03-01

## 2019-02-25 RX ORDER — LIDOCAINE HYDROCHLORIDE AND EPINEPHRINE 10; 10 MG/ML; UG/ML
3 INJECTION, SOLUTION INFILTRATION; PERINEURAL ONCE
Status: DISCONTINUED | OUTPATIENT
Start: 2019-02-25 | End: 2019-03-01

## 2019-02-25 ASSESSMENT — ENCOUNTER SYMPTOMS
SNORES LOUDLY: 0
BLOOD IN STOOL: 0
SHORTNESS OF BREATH: 0
SPUTUM PRODUCTION: 0
VOMITING: 0
COUGH: 1
HEARTBURN: 0
COUGH DISTURBING SLEEP: 1
BLOATING: 1
WHEEZING: 0
RECTAL PAIN: 0
HEMOPTYSIS: 0
JAUNDICE: 0
NAUSEA: 0
BOWEL INCONTINENCE: 0
CONSTIPATION: 0
ABDOMINAL PAIN: 0
DIARRHEA: 1
POSTURAL DYSPNEA: 0

## 2019-02-25 ASSESSMENT — PAIN SCALES - GENERAL: PAINLEVEL: NO PAIN (0)

## 2019-02-25 NOTE — TELEPHONE ENCOUNTER
Prior Authorization Approval    Authorization Effective Date: 12/30/2018  Authorization Expiration Date: 12/31/2019  Medication: Xifaxan 550 mg tab-APPROVED  Approved Dose/Quantity:   Reference #: [id number]   Insurance Company: [employer] Phone [phone] Fax [phone]  Expected CoPay:       CoPay Card Available:      Foundation Assistance Needed:    Which Pharmacy is filling the prescription (Not needed for infusion/clinic administered): [facility] [address] [place] [address]  Pharmacy Notified: Yes  Patient Notified: No-Pharmacy will notify patient when ready.

## 2019-02-25 NOTE — PROGRESS NOTES
S: Mr. Workman is a pleasant 55 yo with ESTRADA induced HCC.  He returns after TACE (1/22) and ablation (1/23) of a large segment 4A lesion and ablation (1/23) of a segment 3 lesion.  He had significant pain afterwards which required admission.  However, this resolved about 10 days after the ablation and now his pain is improved over baseline.  He has no other complaints.    O:  Lab Results   Component Value Date    WBC 3.6 02/04/2019     Lab Results   Component Value Date    RBC 3.54 02/04/2019     Lab Results   Component Value Date    HGB 13.1 02/04/2019     Lab Results   Component Value Date    HCT 36.8 02/04/2019     Lab Results   Component Value Date     02/04/2019     Lab Results   Component Value Date    MCH 37.0 02/04/2019     Lab Results   Component Value Date    MCHC 35.6 02/04/2019     Lab Results   Component Value Date    RDW 15.0 02/04/2019     Lab Results   Component Value Date    PLT 62 02/04/2019     Last Comprehensive Metabolic Panel:  Sodium   Date Value Ref Range Status   02/04/2019 139 133 - 144 mmol/L Final     Potassium   Date Value Ref Range Status   02/04/2019 3.7 3.4 - 5.3 mmol/L Final     Chloride   Date Value Ref Range Status   02/04/2019 110 (H) 94 - 109 mmol/L Final     Carbon Dioxide   Date Value Ref Range Status   02/04/2019 22 20 - 32 mmol/L Final     Anion Gap   Date Value Ref Range Status   02/04/2019 7 3 - 14 mmol/L Final     Glucose   Date Value Ref Range Status   02/04/2019 112 (H) 70 - 99 mg/dL Final     Urea Nitrogen   Date Value Ref Range Status   02/04/2019 17 7 - 30 mg/dL Final     Creatinine   Date Value Ref Range Status   02/04/2019 0.96 0.66 - 1.25 mg/dL Final     GFR Estimate   Date Value Ref Range Status   02/04/2019 89 >60 mL/min/[1.73_m2] Final     Comment:     Non  GFR Calc  Starting 12/18/2018, serum creatinine based estimated GFR (eGFR) will be   calculated using the Chronic Kidney Disease Epidemiology Collaboration   (CKD-EPI) equation.        Calcium   Date Value Ref Range Status   02/04/2019 7.9 (L) 8.5 - 10.1 mg/dL Final     Bilirubin Total   Date Value Ref Range Status   02/04/2019 1.7 (H) 0.2 - 1.3 mg/dL Final     Alkaline Phosphatase   Date Value Ref Range Status   02/04/2019 144 40 - 150 U/L Final     ALT   Date Value Ref Range Status   02/04/2019 56 0 - 70 U/L Final     AST   Date Value Ref Range Status   02/04/2019 62 (H) 0 - 45 U/L Final     MRI: I reviewed his MRI from 2/22 which demonstrated no evidence of residual or recurrent disease.     A/P: Mr. Workman is s/p TACE plus ablation of a segment 4A lesion and ablation of a segment 3 lesion and has no evidence of residual or recurrent disease.  He has not complaints at this time.  I will see him back in two months with repeat MRI.    A total of 15 minutes was spent in care for the patient, of which >50% was spent in counseling and co-ordination of care.

## 2019-02-25 NOTE — NURSING NOTE
Chief Complaint   Patient presents with     Skin Check     Miller is here to be seen for a transplant full body skin check.     Kash Kowalski, LPN

## 2019-02-25 NOTE — PROGRESS NOTES
[facility] Dermatology Note    Dermatology Problem List:  1. History of skin lesion removal from right ear superior helix and right side, upper forehead  -Benign etiology per patient report  2. Right lobule papule: suspected ruptured hair follicle vs less likely BCC; shave biopsy 2/25/19    Encounter Date: Feb 25, 2019    CC:  Chief Complaint   Patient presents with     Skin Check     Miller is here to be seen for a transplant full body skin check.         History of Present Illness:  Mr. Frandy Workman is a 54 year old male who presents for full skin examination in evaluation for liver transplant. Patient with history of ESTRADA cirrhosis and is currently undergoing work up to determine eligibility for liver transplant. He reports history of skin lesions of the superior helix of the right ear, treated with shave removal, and of the superior right side of forehead, treated with cryotherapy. He reports that both of these were benign in nature. Exact etiology of these lesions were unknown. Both treated in November 2017. Around this same time he had right superficial parotidectomy due to mass, found to be abscess. Skin healed well with no concerns. Today he notes an area of concern is his right ear lobe. He has noticed crusting at times of the area and swelling. No known trauma to the area. No open wounds or bleeding. This has been ongoing for a few months. He has also noticed some increased lesions similar to acne of the upper extremities. No additional concerns. No personal history of skin cancer.      Past Medical History:   Patient Active Problem List   Diagnosis     Hepatic cirrhosis (H)     Type II diabetes mellitus (H)     Bipolar affective disorder in remission (H)     Esophageal varices (H)     Nonalcoholic steatohepatitis (ESTRADA)     Brow ptosis     Paralytic lagophthalmos of right upper eyelid     Erectile dysfunction due to diseases classified elsewhere     Skin lesion     HCC (hepatocellular  carcinoma) (H)     Pneumothorax     Acute pain of both shoulders     Fever of unknown origin     Equivocal stress echocardiogram     Other cirrhosis of liver (H)     Type 2 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, unspecified whether long term insulin use (H)     Abnormal findings diagnostic imaging of heart and coronary circulation     Other ill-defined heart diseases     Past Medical History:   Diagnosis Date     Anemia 2013    Low blood plates current is 37     Arthritis      BPH (benign prostatic hyperplasia)      Cholelithiasis      Conductive hearing loss 8/16/2017    Have a lump on my right side of my face.  Had wax discharge     Depressive disorder 1986    Suffer effects throughout life     Gastroesophageal reflux disease 12/1/2014    Being treated with Prilosac     HCC (hepatocellular carcinoma) (H) 1/22/2019     Hepatitis 2014    Diagnosed with schrosis ESTRADA in 2014.  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      Past Surgical History:   Procedure Laterality Date     COLONOSCOPY      2015     ESOPHAGOSCOPY, GASTROSCOPY, DUODENOSCOPY (EGD), COMBINED N/A 11/17/2016    Procedure: COMBINED ESOPHAGOSCOPY, GASTROSCOPY, DUODENOSCOPY (EGD);  Surgeon: Santi Rosas MD;  Location:  GI     ESOPHAGOSCOPY, GASTROSCOPY, DUODENOSCOPY (EGD), COMBINED N/A 11/17/2017    Procedure: COMBINED ESOPHAGOSCOPY, GASTROSCOPY, DUODENOSCOPY (EGD);  EGD;  Surgeon: Santi Rosas MD;  Location:  GI     ESOPHAGOSCOPY, GASTROSCOPY, DUODENOSCOPY (EGD), COMBINED N/A 12/28/2018    Procedure: EGD;  Surgeon: Santi Rosas MD;  Location:  OR     HEAD & NECK SURGERY      12/2017 at [facility].      IMPLANT GOLD WEIGHT EYELID Right 11/16/2017    Procedure: IMPLANT WEIGHT EYELID;  Right Upper Eyelid Weight,  right tarsal strip lower eyelid;  Surgeon: Milana Malave MD;  Location: [facility] OR     IR CHEMO EMBOLIZATION  2019     KNEE SURGERY Left      ORTHOPEDIC SURGERY       PAROTIDECTOMY, RADICAL NECK DISSECTION Right 2017    Procedure: PAROTIDECTOMY, RADICAL NECK DISSECTION;  Right Superfacial Parotidectomy , Facial nerve repair. with [facility] facial nerve monitor.;  Surgeon: Asiya Morgan MD;  Location: [facility] OR     PICC INSERTION Left 2017    4fr SL BioFlo PICC, 44cm in the L basilic vein w/ tip in the low SVC     VASCULAR SURGERY         Social History:  Patient reports that  has never smoked. He quit smokeless tobacco use about 15 months ago. His smokeless tobacco use included chew. He reports that he does not drink alcohol or use drugs.    Family History:  History of skin cancer in both mother and father. Unknown the specific types. Patient reports may be melanoma in father but he does not know exact etiology.   Family History   Problem Relation Age of Onset     Prostate Cancer Maternal Grandfather      Substance Abuse Maternal Grandfather         Alcohol     Colon Cancer Father 60     Pancreatic Cancer Father 60     Prostate Cancer Father      Colorectal Cancer Father      Macular Degeneration Father      Cancer Father      Glaucoma Father      Skin Cancer Father      Colorectal Cancer Maternal Grandmother      Cancer Maternal Grandmother      Substance Abuse Maternal Grandmother         Alcohol     Colorectal Cancer Paternal Grandmother      Cancer Mother      Diabetes Mother          3/2016     Cerebrovascular Disease Mother         Passed away in Feb of this year, 80 years old.     Thyroid Disease Mother      Depression Mother      Asthma Sister         Had since birth     Thyroid Disease Sister      Depression Sister      Liver Disease No family hx of      Melanoma No family hx of        Medications:  Current Outpatient Medications   Medication Sig Dispense Refill     ACCU-CHEK EDINSON PLUS  test strip USE TO TEST BLOOD SUGARS FOUR TIMES DAILY OR AS DIRECTED 150 strip 11     acetaminophen (TYLENOL) 325 MG tablet Take 2 tablets (650 mg) by mouth every 4 hours as needed for mild pain       Artificial Tear Ointment (REFRESH LACRI-LUBE) OINT Apply 1 Application to eye At Bedtime 1 Tube 3     Artificial Tear Solution (SM ARTIFICIAL TEARS) SOLN Place 1 drop into the right eye every hour as needed Apply at least 4 times daily and as needed for dry eye 1 Bottle 3     aspirin (ASPIRIN LOW DOSE) 81 MG EC tablet Take 1 tablet (81 mg) by mouth daily 90 tablet 2     BD VIKTORIA U/F 32G X 4 MM insulin pen needle Use 5 per day 300 each 3     blood glucose monitoring (ACCU-CHEK EDINSON PLUS) test strip USE TO TEST BLOOD SUGARS FOUR TIMES DAILY OR AS DIRECTED 400 strip 3     blood glucose monitoring (ACCU-CHEK EDINSON PLUS) test strip USE TO TEST BLOOD SUGARS FOUR TIMES DAILY OR AS DIRECTED 300 strip 3     blood glucose monitoring (ACCU-CHEK EDINSON PLUS) test strip Use to test blood sugar 4 times daily 400 each 3     blood glucose monitoring (ACCU-CHEK FASTCLIX) lancets Use to test blood sugar 4 times daily or as directed.  1 box = 102 lancets 408 each 3     capsaicin (ZOSTRIX) 0.025 % CREA cream To feet 2-3 times daily as needed. 60 g 6     carvedilol (COREG) 12.5 MG tablet TAKE 1 TABLET(12.5 MG) BY MOUTH TWICE DAILY WITH MEALS 180 tablet 3     Cholecalciferol (VITAMIN D-3 PO) Take 2,000 Units by mouth every morning        clindamycin (CLEOCIN T) 1 % external lotion Apply topically 2 times daily 60 mL 3     cyanocobalamin (RA VITAMIN B-12 TR) 1000 MCG TBCR Take 1,000 mcg by mouth every morning        dapagliflozin (FARXIGA) 10 MG TABS tablet Take 1 tablet (10 mg) by mouth daily 90 tablet 3     econazole nitrate 1 % external cream APPLY TOPICALLY DAILY TO FEET AND TOENAILS 85 g 0     erythromycin (ROMYCIN) ophthalmic ointment Place into the right eye 3 times daily 1 Tube 1     ibuprofen (ADVIL/MOTRIN) 600 MG tablet Take 1 tablet  (600 mg) by mouth every 6 hours as needed for moderate pain       insulin degludec (TRESIBA) 200 UNIT/ML pen Take 100 units daily. 100 mL 3     IRON PO Take by mouth daily       lactulose (CHRONULAC) 10 GM/15ML solution Take 45 mLs (30 g) by mouth 4 times daily 5000 mL 11     Multiple Vitamin (THERAVITE PO) Take 1 tablet by mouth every morning        NOVOLOG FLEXPEN 100 UNIT/ML soln INJECT 1 UNIT PER 4 GRAMS OF CARBS AT MEALS AND SNACKS. CORRECTION SCALE OF 1 UNITS PER 25 OVER [address]. AVE DOSE 75 UNITERS PER DAY 30 mL 3     OLANZapine (ZYPREXA) 2.5 MG tablet Take 1 tablet (2.5 mg) by mouth At Bedtime 90 tablet 1     omeprazole (PRILOSEC) 40 MG capsule Take 1 capsule (40 mg) by mouth daily 90 capsule 2     potassium chloride SA (K-DUR/KLOR-CON M) 20 MEQ CR tablet Take 1 tablet (20 mEq) by mouth daily 90 tablet 2     pravastatin (PRAVACHOL) 20 MG tablet Take 1 tablet (20 mg) by mouth daily 90 tablet 3     Propylene Glycol-Glycerin (ARTIFICIAL TEARS) 1-0.3 % SOLN Apply 1 drop to eye every 2 hours (while awake) 30 mL 11     rifaximin (XIFAXAN) 550 MG TABS tablet TAKE 1 TABLET(550 MG) BY MOUTH TWICE DAILY 60 tablet 3     rifaximin (XIFAXAN) 550 MG TABS tablet Take 1 tablet (550 mg) by mouth 2 times daily 60 tablet 11     sildenafil (REVATIO) 20 MG tablet Take 2-3  Tablets before activity by mouth 90 tablet 3     tamsulosin (FLOMAX) 0.4 MG capsule Take 1 capsule (0.4 mg) by mouth daily 90 capsule 3     UNABLE TO FIND 2 times daily ULTRA MALE ENHANCEMENT POTENCY       tadalafil (CIALIS) 20 MG tablet Take 1 tablet (20 mg) by mouth daily as needed (Patient not taking: Reported on 2/25/2019) 30 tablet 0     Allergies   Allergen Reactions     Codeine Other (See Comments)     Cannot take due to liver  Cannot tolerate oral narcotics         Review of Systems:  -Constitutional: Otherwise feeling well today, in usual state of health.  -HEENT: Patient denies nonhealing oral sores.  -Skin: As above in HPI. No additional skin  concerns.    Physical exam:  Vitals: /64 (BP Location: Left arm, Patient Position: Chair)   Pulse 67   GEN: This is a well developed, well-nourished male in no acute distress, in a pleasant mood.    SKIN: Total skin excluding the undergarment areas was performed. The exam included the head/face, neck, both arms, chest, back, abdomen, both legs, buttocks, digits and/or nails.   -Erythematous ill-defined papule on the right lobule with superficial erosion and scant adherent hemorrhagic crust   -light pink papules and pustules scattered on the trunk and upper extremities  -Scar of the superior right side of forehead, hypopigmented and well healed  -No other lesions of concern on areas examined.     Impression/Plan:  1. Neoplasm of uncertain behavior on the right ear lobule. The differential diagnosis includes ruptured hair follicle vs basal cell carcinoma: Discussed possible etiologies with patient. On exam today appears to be more consistent with inflammation. Options for treatment include cryotherapy vs biopsy. Due to current ongoing evaluation for liver transplant patient would like biopsy today for definitive diagnosis.     Shave biopsy(performed by faculty): After discussion of benefits and risks including but not limited to bleeding, infection, scar, incomplete removal, recurrence, and non-diagnostic biopsy, written consent and photographs were obtained. Time-out was performed. The area was cleaned with isopropyl alcohol. 1.5 mL of 1% lidocaine with 1:100,000 epinephrine was injected to obtain adequate anesthesia of the lesion on the right ear lobule. A shave biopsy was performed. Hemostasis was achieved with aluminium chloride. Vaseline and a sterile dressing were applied. The patient tolerated the procedure and no complications were noted. The patient was provided with verbal and written post care instructions.     Follow up made based on pathology results if further intervention  necessary    2. Folliculitis of upper extremities and back    Prescription given for clindamycin 1% lotion to be used BID. Recommended use of OTC benzoyl peroxide 5% wash daily.       CC Mohamud Lu MD  [address] [facility] [address]  [place] [address] on close of this encounter.  Follow up pending results of biopsy taken today. Patient will be contacted by phone with results.   Follow-up in 1 year, earlier for new or changing lesions.     Staff Involved:  I, Chichi Mcdermott, MS4, saw and examined the patient in the presence of Dr. Smith.    Staff Physician:  I was present with the medical student who participated in the service and in the documentation of the note. I have verified the history and personally performed the physical exam and medical decision making. I edited the assessment and plan of care as documented in the note.     The above procedure was) performed by myself.    Curtis Smith MD   of Dermatology  Department of Dermatology  [facility] [employer]

## 2019-02-25 NOTE — LETTER
2/25/2019       RE: Frandy Workman  [address] [facility] [address]     Dear Colleague,    Thank you for referring your patient, Frandy Workman, to the [facility] DERMATOLOGY at [facility]. Please see a copy of my visit note below.    [facility] Dermatology Note    Dermatology Problem List:  1. History of skin lesion removal from right ear superior helix and right side, upper forehead  -Benign etiology per patient report  2. Right lobule papule: suspected ruptured hair follicle vs less likely BCC; shave biopsy 2/25/19    Encounter Date: Feb 25, 2019    CC:  Chief Complaint   Patient presents with     Skin Check     Miller is here to be seen for a transplant full body skin check.         History of Present Illness:  Mr. Frnady Workman is a 54 year old male who presents for full skin examination in evaluation for liver transplant. Patient with history of ESTRADA cirrhosis and is currently undergoing work up to determine eligibility for liver transplant. He reports history of skin lesions of the superior helix of the right ear, treated with shave removal, and of the superior right side of forehead, treated with cryotherapy. He reports that both of these were benign in nature. Exact etiology of these lesions were unknown. Both treated in November 2017. Around this same time he had right superficial parotidectomy due to mass, found to be abscess. Skin healed well with no concerns. Today he notes an area of concern is his right ear lobe. He has noticed crusting at times of the area and swelling. No known trauma to the area. No open wounds or bleeding. This has been ongoing for a few months. He has also noticed some increased lesions similar to acne of the upper extremities. No additional concerns. No personal history of skin cancer.      Past Medical History:   Patient Active Problem List   Diagnosis     Hepatic cirrhosis (H)     Type II diabetes mellitus (H)      Bipolar affective disorder in remission (H)     Esophageal varices (H)     Nonalcoholic steatohepatitis (ESTRADA)     Brow ptosis     Paralytic lagophthalmos of right upper eyelid     Erectile dysfunction due to diseases classified elsewhere     Skin lesion     HCC (hepatocellular carcinoma) (H)     Pneumothorax     Acute pain of both shoulders     Fever of unknown origin     Equivocal stress echocardiogram     Other cirrhosis of liver (H)     Type 2 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, unspecified whether long term insulin use (H)     Abnormal findings diagnostic imaging of heart and coronary circulation     Other ill-defined heart diseases     Past Medical History:   Diagnosis Date     Anemia 2013    Low blood plates current is 37     Arthritis      BPH (benign prostatic hyperplasia)      Cholelithiasis      Conductive hearing loss 8/16/2017    Have a lump on my right side of my face.  Had wax discharge     Depressive disorder 1986    Suffer effects throughout life     Gastroesophageal reflux disease 12/1/2014    Being treated with Prilosac     HCC (hepatocellular carcinoma) (H) 1/22/2019     Hepatitis 2014    Diagnosed with schrosis ESTRADA in 2014.  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      Past Surgical History:   Procedure Laterality Date     COLONOSCOPY      2015     ESOPHAGOSCOPY, GASTROSCOPY, DUODENOSCOPY (EGD), COMBINED N/A 11/17/2016    Procedure: COMBINED ESOPHAGOSCOPY, GASTROSCOPY, DUODENOSCOPY (EGD);  Surgeon: Santi Rosas MD;  Location:  GI     ESOPHAGOSCOPY, GASTROSCOPY, DUODENOSCOPY (EGD), COMBINED N/A 11/17/2017    Procedure: COMBINED ESOPHAGOSCOPY, GASTROSCOPY, DUODENOSCOPY (EGD);  EGD;  Surgeon: Santi Rosas MD;  Location:  GI     ESOPHAGOSCOPY, GASTROSCOPY,  DUODENOSCOPY (EGD), COMBINED N/A 2018    Procedure: EGD;  Surgeon: Santi Rosas MD;  Location:  OR     HEAD & NECK SURGERY      2017 at [facility].      IMPLANT GOLD WEIGHT EYELID Right 2017    Procedure: IMPLANT WEIGHT EYELID;  Right Upper Eyelid Weight, right tarsal strip lower eyelid;  Surgeon: Milana Malave MD;  Location:  OR     IR CHEMO EMBOLIZATION  2019     KNEE SURGERY Left      ORTHOPEDIC SURGERY       PAROTIDECTOMY, RADICAL NECK DISSECTION Right 2017    Procedure: PAROTIDECTOMY, RADICAL NECK DISSECTION;  Right Superfacial Parotidectomy , Facial nerve repair. with [facility] facial nerve monitor.;  Surgeon: Asiya Morgan MD;  Location: [facility] OR     PICC INSERTION Left 2017    4fr SL BioFlo PICC, 44cm in the L basilic vein w/ tip in the low SVC     VASCULAR SURGERY         Social History:  Patient reports that  has never smoked. He quit smokeless tobacco use about 15 months ago. His smokeless tobacco use included chew. He reports that he does not drink alcohol or use drugs.    Family History:  History of skin cancer in both mother and father. Unknown the specific types. Patient reports may be melanoma in father but he does not know exact etiology.   Family History   Problem Relation Age of Onset     Prostate Cancer Maternal Grandfather      Substance Abuse Maternal Grandfather         Alcohol     Colon Cancer Father 60     Pancreatic Cancer Father 60     Prostate Cancer Father      Colorectal Cancer Father      Macular Degeneration Father      Cancer Father      Glaucoma Father      Skin Cancer Father      Colorectal Cancer Maternal Grandmother      Cancer Maternal Grandmother      Substance Abuse Maternal Grandmother         Alcohol     Colorectal Cancer Paternal Grandmother      Cancer Mother      Diabetes Mother          3/2016     Cerebrovascular Disease Mother         Passed away in Feb of this year, 80 years old.     Thyroid Disease Mother       Depression Mother      Asthma Sister         Had since birth     Thyroid Disease Sister      Depression Sister      Liver Disease No family hx of      Melanoma No family hx of        Medications:  Current Outpatient Medications   Medication Sig Dispense Refill     ACCU-CHEK EDINSON PLUS test strip USE TO TEST BLOOD SUGARS FOUR TIMES DAILY OR AS DIRECTED 150 strip 11     acetaminophen (TYLENOL) 325 MG tablet Take 2 tablets (650 mg) by mouth every 4 hours as needed for mild pain       Artificial Tear Ointment (REFRESH LACRI-LUBE) OINT Apply 1 Application to eye At Bedtime 1 Tube 3     Artificial Tear Solution (SM ARTIFICIAL TEARS) SOLN Place 1 drop into the right eye every hour as needed Apply at least 4 times daily and as needed for dry eye 1 Bottle 3     aspirin (ASPIRIN LOW DOSE) 81 MG EC tablet Take 1 tablet (81 mg) by mouth daily 90 tablet 2     BD VIKTORIA U/F 32G X 4 MM insulin pen needle Use 5 per day 300 each 3     blood glucose monitoring (ACCU-CHEK EDINSON PLUS) test strip USE TO TEST BLOOD SUGARS FOUR TIMES DAILY OR AS DIRECTED 400 strip 3     blood glucose monitoring (ACCU-CHEK EDINSON PLUS) test strip USE TO TEST BLOOD SUGARS FOUR TIMES DAILY OR AS DIRECTED 300 strip 3     blood glucose monitoring (ACCU-CHEK EDINSON PLUS) test strip Use to test blood sugar 4 times daily 400 each 3     blood glucose monitoring (ACCU-CHEK FASTCLIX) lancets Use to test blood sugar 4 times daily or as directed.  1 box = 102 lancets 408 each 3     capsaicin (ZOSTRIX) 0.025 % CREA cream To feet 2-3 times daily as needed. 60 g 6     carvedilol (COREG) 12.5 MG tablet TAKE 1 TABLET(12.5 MG) BY MOUTH TWICE DAILY WITH MEALS 180 tablet 3     Cholecalciferol (VITAMIN D-3 PO) Take 2,000 Units by mouth every morning        clindamycin (CLEOCIN T) 1 % external lotion Apply topically 2 times daily 60 mL 3     cyanocobalamin (RA VITAMIN B-12 TR) 1000 MCG TBCR Take 1,000 mcg by mouth every morning        dapagliflozin (FARXIGA) 10 MG TABS tablet Take  1 tablet (10 mg) by mouth daily 90 tablet 3     econazole nitrate 1 % external cream APPLY TOPICALLY DAILY TO FEET AND TOENAILS 85 g 0     erythromycin (ROMYCIN) ophthalmic ointment Place into the right eye 3 times daily 1 Tube 1     ibuprofen (ADVIL/MOTRIN) 600 MG tablet Take 1 tablet (600 mg) by mouth every 6 hours as needed for moderate pain       insulin degludec (TRESIBA) 200 UNIT/ML pen Take 100 units daily. 100 mL 3     IRON PO Take by mouth daily       lactulose (CHRONULAC) 10 GM/15ML solution Take 45 mLs (30 g) by mouth 4 times daily 5000 mL 11     Multiple Vitamin (THERAVITE PO) Take 1 tablet by mouth every morning        NOVOLOG FLEXPEN 100 UNIT/ML soln INJECT 1 UNIT PER 4 GRAMS OF CARBS AT MEALS AND SNACKS. CORRECTION SCALE OF 1 UNITS PER 25 OVER [address]. AVE DOSE 75 UNITERS PER DAY 30 mL 3     OLANZapine (ZYPREXA) 2.5 MG tablet Take 1 tablet (2.5 mg) by mouth At Bedtime 90 tablet 1     omeprazole (PRILOSEC) 40 MG capsule Take 1 capsule (40 mg) by mouth daily 90 capsule 2     potassium chloride SA (K-DUR/KLOR-CON M) 20 MEQ CR tablet Take 1 tablet (20 mEq) by mouth daily 90 tablet 2     pravastatin (PRAVACHOL) 20 MG tablet Take 1 tablet (20 mg) by mouth daily 90 tablet 3     Propylene Glycol-Glycerin (ARTIFICIAL TEARS) 1-0.3 % SOLN Apply 1 drop to eye every 2 hours (while awake) 30 mL 11     rifaximin (XIFAXAN) 550 MG TABS tablet TAKE 1 TABLET(550 MG) BY MOUTH TWICE DAILY 60 tablet 3     rifaximin (XIFAXAN) 550 MG TABS tablet Take 1 tablet (550 mg) by mouth 2 times daily 60 tablet 11     sildenafil (REVATIO) 20 MG tablet Take 2-3  Tablets before activity by mouth 90 tablet 3     tamsulosin (FLOMAX) 0.4 MG capsule Take 1 capsule (0.4 mg) by mouth daily 90 capsule 3     UNABLE TO FIND 2 times daily ULTRA MALE ENHANCEMENT POTENCY       tadalafil (CIALIS) 20 MG tablet Take 1 tablet (20 mg) by mouth daily as needed (Patient not taking: Reported on 2/25/2019) 30 tablet 0     Allergies   Allergen Reactions      Codeine Other (See Comments)     Cannot take due to liver  Cannot tolerate oral narcotics         Review of Systems:  -Constitutional: Otherwise feeling well today, in usual state of health.  -HEENT: Patient denies nonhealing oral sores.  -Skin: As above in HPI. No additional skin concerns.    Physical exam:  Vitals: /64 (BP Location: Left arm, Patient Position: Chair)   Pulse 67   GEN: This is a well developed, well-nourished male in no acute distress, in a pleasant mood.    SKIN: Total skin excluding the undergarment areas was performed. The exam included the head/face, neck, both arms, chest, back, abdomen, both legs, buttocks, digits and/or nails.   -Erythematous ill-defined papule on the right lobule with superficial erosion and scant adherent hemorrhagic crust   -light pink papules and pustules scattered on the trunk and upper extremities  -Scar of the superior right side of forehead, hypopigmented and well healed  -No other lesions of concern on areas examined.     Impression/Plan:  1. Neoplasm of uncertain behavior on the right ear lobule. The differential diagnosis includes ruptured hair follicle vs basal cell carcinoma: Discussed possible etiologies with patient. On exam today appears to be more consistent with inflammation. Options for treatment include cryotherapy vs biopsy. Due to current ongoing evaluation for liver transplant patient would like biopsy today for definitive diagnosis.     Shave biopsy(performed by faculty): After discussion of benefits and risks including but not limited to bleeding, infection, scar, incomplete removal, recurrence, and non-diagnostic biopsy, written consent and photographs were obtained. Time-out was performed. The area was cleaned with isopropyl alcohol. 1.5 mL of 1% lidocaine with 1:100,000 epinephrine was injected to obtain adequate anesthesia of the lesion on the right ear lobule. A shave biopsy was performed. Hemostasis was achieved with aluminium chloride.  Vaseline and a sterile dressing were applied. The patient tolerated the procedure and no complications were noted. The patient was provided with verbal and written post care instructions.     Follow up made based on pathology results if further intervention necessary    2. Folliculitis of upper extremities and back    Prescription given for clindamycin 1% lotion to be used BID. Recommended use of OTC benzoyl peroxide 5% wash daily.       CC Mohamud Lu MD  [address]  [place] [address] on close of this encounter.  Follow up pending results of biopsy taken today. Patient will be contacted by phone with results.   Follow-up in 1 year, earlier for new or changing lesions.     Staff Involved:  I, Chichi Mcdermott, MS4, saw and examined the patient in the presence of Dr. Smith.    Staff Physician:  I was present with the medical student who participated in the service and in the documentation of the note. I have verified the history and personally performed the physical exam and medical decision making. I edited the assessment and plan of care as documented in the note.     The above procedure was) performed by myself.    Curtis Smith MD   of Dermatology  Department of Dermatology  [facility] [employer]

## 2019-02-25 NOTE — NURSING NOTE
Biopsy performed on the right ear. Injected 0.5ml of Xylocaine  (Lidocaine 1% and Epinephrine  (1:100,000))    Injected by Doctor Curtis Smith    Present for procedure:  Medical Student Chichi Kowalski LPN    Wound care: Vaseline, band aid  Patient denies pain and sent home with supplies.    Kash Kowalski LPN

## 2019-02-25 NOTE — LETTER
2/25/2019     RE: Frandy Workman  [address] [facility] [address]     Dear Colleague,    Thank you for referring your patient, Frandy Workman, to the [facility] VASCULAR CLINIC at [facility]. Please see a copy of my visit note below.    S: Mr. 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  He returns after TACE (1/22) and ablation (1/23) of a large segment 4A lesion and ablation (1/23) of a segment 3 lesion.  He had significant pain afterwards which required admission.  However, this resolved about 10 days after the ablation and now his pain is improved over baseline.  He has no other complaints.    O:  Lab Results   Component Value Date    WBC 3.6 02/04/2019     Lab Results   Component Value Date    RBC 3.54 02/04/2019     Lab Results   Component Value Date    HGB 13.1 02/04/2019     Lab Results   Component Value Date    HCT 36.8 02/04/2019     Lab Results   Component Value Date     02/04/2019     Lab Results   Component Value Date    MCH 37.0 02/04/2019     Lab Results   Component Value Date    MCHC 35.6 02/04/2019     Lab Results   Component Value Date    RDW 15.0 02/04/2019     Lab Results   Component Value Date    PLT 62 02/04/2019     Last Comprehensive Metabolic Panel:  Sodium   Date Value Ref Range Status   02/04/2019 139 133 - 144 mmol/L Final     Potassium   Date Value Ref Range Status   02/04/2019 3.7 3.4 - 5.3 mmol/L Final     Chloride   Date Value Ref Range Status   02/04/2019 110 (H) 94 - 109 mmol/L Final     Carbon Dioxide   Date Value Ref Range Status   02/04/2019 22 20 - 32 mmol/L Final     Anion Gap   Date Value Ref Range Status   02/04/2019 7 3 - 14 mmol/L Final     Glucose   Date Value Ref Range Status   02/04/2019 112 (H) 70 - 99 mg/dL Final     Urea Nitrogen   Date Value Ref Range Status   02/04/2019 17 7 - 30 mg/dL Final     Creatinine   Date Value Ref Range Status   02/04/2019 0.96 0.66 - 1.25 mg/dL Final     GFR Estimate   Date Value Ref  Range Status   02/04/2019 89 >60 mL/min/[1.73_m2] Final     Comment:     Non  GFR Calc  Starting 12/18/2018, serum creatinine based estimated GFR (eGFR) will be   calculated using the Chronic Kidney Disease Epidemiology Collaboration   (CKD-EPI) equation.       Calcium   Date Value Ref Range Status   02/04/2019 7.9 (L) 8.5 - 10.1 mg/dL Final     Bilirubin Total   Date Value Ref Range Status   02/04/2019 1.7 (H) 0.2 - 1.3 mg/dL Final     Alkaline Phosphatase   Date Value Ref Range Status   02/04/2019 144 40 - 150 U/L Final     ALT   Date Value Ref Range Status   02/04/2019 56 0 - 70 U/L Final     AST   Date Value Ref Range Status   02/04/2019 62 (H) 0 - 45 U/L Final     MRI: I reviewed his MRI from 2/22 which demonstrated no evidence of residual or recurrent disease.     A/P: Mr. Workman is s/p TACE plus ablation of a segment 4A lesion and ablation of a segment 3 lesion and has no evidence of residual or recurrent disease.  He has not complaints at this time.  I will see him back in two months with repeat MRI.    A total of 15 minutes was spent in care for the patient, of which >50% was spent in counseling and co-ordination of care.    Again, thank you for allowing me to participate in the care of your patient.      Sincerely,    Benigno Scott MD

## 2019-02-25 NOTE — NURSING NOTE
Vascular Rooming Note     Frandy Workman's goals for this visit include:   Chief Complaint   Patient presents with     RECHECK     Miller is being seen today for a follow up TACE, feeling better after surgery, sharp pain afterwords is no longer present, as the day goes on he is currently experience diaherra as reported by patient.     Wendy Laughlin LPN

## 2019-02-26 ENCOUNTER — TELEPHONE (OUTPATIENT)
Dept: TRANSPLANT | Facility: CLINIC | Age: 54
End: 2019-02-26

## 2019-02-26 ENCOUNTER — APPOINTMENT (OUTPATIENT)
Dept: MEDSURG UNIT | Facility: CLINIC | Age: 54
End: 2019-02-26
Attending: INTERNAL MEDICINE
Payer: MEDICARE

## 2019-02-26 ENCOUNTER — HOSPITAL ENCOUNTER (OUTPATIENT)
Facility: CLINIC | Age: 54
Discharge: HOME | End: 2019-02-26
Attending: INTERNAL MEDICINE | Admitting: INTERNAL MEDICINE
Payer: MEDICARE

## 2019-02-26 VITALS
OXYGEN SATURATION: 97 % | TEMPERATURE: 97.8 F | SYSTOLIC BLOOD PRESSURE: 111 MMHG | DIASTOLIC BLOOD PRESSURE: 65 MMHG | RESPIRATION RATE: 14 BRPM | WEIGHT: 195 LBS | HEART RATE: 70 BPM | HEIGHT: 69 IN | BODY MASS INDEX: 28.88 KG/M2

## 2019-02-26 DIAGNOSIS — C22.0 HCC (HEPATOCELLULAR CARCINOMA) (H): ICD-10-CM

## 2019-02-26 DIAGNOSIS — K74.69 OTHER CIRRHOSIS OF LIVER (H): ICD-10-CM

## 2019-02-26 DIAGNOSIS — Z98.890 S/P CORONARY ANGIOGRAM: Primary | ICD-10-CM

## 2019-02-26 DIAGNOSIS — I51.89 OTHER ILL-DEFINED HEART DISEASES: ICD-10-CM

## 2019-02-26 DIAGNOSIS — E11.3293 TYPE 2 DIABETES MELLITUS WITH MILD NONPROLIFERATIVE RETINOPATHY OF BOTH EYES WITHOUT MACULAR EDEMA, UNSPECIFIED WHETHER LONG TERM INSULIN USE (H): ICD-10-CM

## 2019-02-26 DIAGNOSIS — R93.1 ABNORMAL FINDINGS DIAGNOSTIC IMAGING OF HEART AND CORONARY CIRCULATION: ICD-10-CM

## 2019-02-26 LAB
AFP SERPL-MCNC: 5.5 UG/L (ref 0–8)
ALBUMIN SERPL-MCNC: 2.8 G/DL (ref 3.4–5)
ALP SERPL-CCNC: 122 U/L (ref 40–150)
ALT SERPL W P-5'-P-CCNC: 32 U/L (ref 0–70)
ANION GAP SERPL CALCULATED.3IONS-SCNC: 9 MMOL/L (ref 3–14)
AST SERPL W P-5'-P-CCNC: 39 U/L (ref 0–45)
BILIRUB DIRECT SERPL-MCNC: 0.5 MG/DL (ref 0–0.2)
BILIRUB SERPL-MCNC: 1.4 MG/DL (ref 0.2–1.3)
BUN SERPL-MCNC: 22 MG/DL (ref 7–30)
CALCIUM SERPL-MCNC: 8.3 MG/DL (ref 8.5–10.1)
CHLORIDE SERPL-SCNC: 112 MMOL/L (ref 94–109)
CO2 SERPL-SCNC: 22 MMOL/L (ref 20–32)
CREAT SERPL-MCNC: 1.2 MG/DL (ref 0.66–1.25)
ERYTHROCYTE [DISTWIDTH] IN BLOOD BY AUTOMATED COUNT: 15.5 % (ref 10–15)
GFR SERPL CREATININE-BSD FRML MDRD: 68 ML/MIN/{1.73_M2}
GLUCOSE BLDC GLUCOMTR-MCNC: 149 MG/DL (ref 70–99)
GLUCOSE BLDC GLUCOMTR-MCNC: 198 MG/DL (ref 70–99)
GLUCOSE BLDC GLUCOMTR-MCNC: 81 MG/DL (ref 70–99)
GLUCOSE SERPL-MCNC: 87 MG/DL (ref 70–99)
HCT VFR BLD AUTO: 40.2 % (ref 40–53)
HGB BLD-MCNC: 13.4 G/DL (ref 13.3–17.7)
INR PPP: 1.36 (ref 0.86–1.14)
MCH RBC QN AUTO: 36.8 PG (ref 26.5–33)
MCHC RBC AUTO-ENTMCNC: 33.3 G/DL (ref 31.5–36.5)
MCV RBC AUTO: 110 FL (ref 78–100)
PLATELET # BLD AUTO: 57 10E9/L (ref 150–450)
POTASSIUM SERPL-SCNC: 4.1 MMOL/L (ref 3.4–5.3)
PROT SERPL-MCNC: 7.2 G/DL (ref 6.8–8.8)
RBC # BLD AUTO: 3.64 10E12/L (ref 4.4–5.9)
SODIUM SERPL-SCNC: 144 MMOL/L (ref 133–144)
WBC # BLD AUTO: 3.9 10E9/L (ref 4–11)

## 2019-02-26 PROCEDURE — 25000128 H RX IP 250 OP 636: Performed by: INTERNAL MEDICINE

## 2019-02-26 PROCEDURE — 93010 ELECTROCARDIOGRAM REPORT: CPT | Performed by: INTERNAL MEDICINE

## 2019-02-26 PROCEDURE — 40000065 ZZH STATISTIC EKG NON-CHARGEABLE

## 2019-02-26 PROCEDURE — 93454 CORONARY ARTERY ANGIO S&I: CPT | Mod: 26 | Performed by: INTERNAL MEDICINE

## 2019-02-26 PROCEDURE — 82105 ALPHA-FETOPROTEIN SERUM: CPT | Performed by: RADIOLOGY

## 2019-02-26 PROCEDURE — C1769 GUIDE WIRE: HCPCS | Performed by: INTERNAL MEDICINE

## 2019-02-26 PROCEDURE — 99153 MOD SED SAME PHYS/QHP EA: CPT | Performed by: INTERNAL MEDICINE

## 2019-02-26 PROCEDURE — 93454 CORONARY ARTERY ANGIO S&I: CPT | Performed by: INTERNAL MEDICINE

## 2019-02-26 PROCEDURE — 85610 PROTHROMBIN TIME: CPT | Performed by: RADIOLOGY

## 2019-02-26 PROCEDURE — 82962 GLUCOSE BLOOD TEST: CPT

## 2019-02-26 PROCEDURE — 40000172 ZZH STATISTIC PROCEDURE PREP ONLY

## 2019-02-26 PROCEDURE — 27210794 ZZH OR GENERAL SUPPLY STERILE: Performed by: INTERNAL MEDICINE

## 2019-02-26 PROCEDURE — 25000125 ZZHC RX 250: Performed by: INTERNAL MEDICINE

## 2019-02-26 PROCEDURE — 93005 ELECTROCARDIOGRAM TRACING: CPT

## 2019-02-26 PROCEDURE — 80053 COMPREHEN METABOLIC PANEL: CPT | Performed by: RADIOLOGY

## 2019-02-26 PROCEDURE — 99152 MOD SED SAME PHYS/QHP 5/>YRS: CPT | Performed by: INTERNAL MEDICINE

## 2019-02-26 PROCEDURE — 85027 COMPLETE CBC AUTOMATED: CPT | Performed by: RADIOLOGY

## 2019-02-26 PROCEDURE — C1753 CATH, INTRAVAS ULTRASOUND: HCPCS | Performed by: INTERNAL MEDICINE

## 2019-02-26 PROCEDURE — C1894 INTRO/SHEATH, NON-LASER: HCPCS | Performed by: INTERNAL MEDICINE

## 2019-02-26 PROCEDURE — 92978 ENDOLUMINL IVUS OCT C 1ST: CPT | Performed by: INTERNAL MEDICINE

## 2019-02-26 PROCEDURE — 82248 BILIRUBIN DIRECT: CPT | Performed by: RADIOLOGY

## 2019-02-26 PROCEDURE — 36415 COLL VENOUS BLD VENIPUNCTURE: CPT | Performed by: RADIOLOGY

## 2019-02-26 PROCEDURE — C1887 CATHETER, GUIDING: HCPCS | Performed by: INTERNAL MEDICINE

## 2019-02-26 RX ORDER — FERROUS SULFATE 325(65) MG
325 TABLET ORAL
COMMUNITY
End: 2019-09-05

## 2019-02-26 RX ORDER — NITROGLYCERIN 20 MG/100ML
.07-2 INJECTION INTRAVENOUS CONTINUOUS PRN
Status: DISCONTINUED | OUTPATIENT
Start: 2019-02-26 | End: 2019-02-26 | Stop reason: HOSPADM

## 2019-02-26 RX ORDER — LIDOCAINE 40 MG/G
CREAM TOPICAL
Status: DISCONTINUED | OUTPATIENT
Start: 2019-02-26 | End: 2019-02-26 | Stop reason: HOSPADM

## 2019-02-26 RX ORDER — FENTANYL CITRATE 50 UG/ML
25-50 INJECTION, SOLUTION INTRAMUSCULAR; INTRAVENOUS
Status: DISCONTINUED | OUTPATIENT
Start: 2019-02-26 | End: 2019-02-26 | Stop reason: HOSPADM

## 2019-02-26 RX ORDER — NOREPINEPHRINE BITARTRATE/D5W 16MG/250ML
.03-.4 PLASTIC BAG, INJECTION (ML) INTRAVENOUS CONTINUOUS PRN
Status: DISCONTINUED | OUTPATIENT
Start: 2019-02-26 | End: 2019-02-26 | Stop reason: HOSPADM

## 2019-02-26 RX ORDER — NALOXONE HYDROCHLORIDE 0.4 MG/ML
.1-.4 INJECTION, SOLUTION INTRAMUSCULAR; INTRAVENOUS; SUBCUTANEOUS
Status: DISCONTINUED | OUTPATIENT
Start: 2019-02-26 | End: 2019-02-26 | Stop reason: HOSPADM

## 2019-02-26 RX ORDER — SODIUM CHLORIDE 9 MG/ML
INJECTION, SOLUTION INTRAVENOUS CONTINUOUS
Status: DISCONTINUED | OUTPATIENT
Start: 2019-02-26 | End: 2019-02-26 | Stop reason: HOSPADM

## 2019-02-26 RX ORDER — HEPARIN SODIUM 1000 [USP'U]/ML
INJECTION, SOLUTION INTRAVENOUS; SUBCUTANEOUS
Status: DISCONTINUED | OUTPATIENT
Start: 2019-02-26 | End: 2019-02-26 | Stop reason: HOSPADM

## 2019-02-26 RX ORDER — NITROGLYCERIN 5 MG/ML
VIAL (ML) INTRAVENOUS
Status: DISCONTINUED | OUTPATIENT
Start: 2019-02-26 | End: 2019-02-26 | Stop reason: HOSPADM

## 2019-02-26 RX ORDER — ARGATROBAN 1 MG/ML
350 INJECTION, SOLUTION INTRAVENOUS
Status: DISCONTINUED | OUTPATIENT
Start: 2019-02-26 | End: 2019-02-26 | Stop reason: HOSPADM

## 2019-02-26 RX ORDER — FLUMAZENIL 0.1 MG/ML
0.2 INJECTION, SOLUTION INTRAVENOUS
Status: DISCONTINUED | OUTPATIENT
Start: 2019-02-26 | End: 2019-02-26 | Stop reason: HOSPADM

## 2019-02-26 RX ORDER — DOBUTAMINE HYDROCHLORIDE 200 MG/100ML
5-40 INJECTION INTRAVENOUS CONTINUOUS PRN
Status: DISCONTINUED | OUTPATIENT
Start: 2019-02-26 | End: 2019-02-26 | Stop reason: HOSPADM

## 2019-02-26 RX ORDER — ATROPINE SULFATE 0.1 MG/ML
0.5 INJECTION INTRAVENOUS EVERY 5 MIN PRN
Status: DISCONTINUED | OUTPATIENT
Start: 2019-02-26 | End: 2019-02-26 | Stop reason: HOSPADM

## 2019-02-26 RX ORDER — ARGATROBAN 1 MG/ML
150 INJECTION, SOLUTION INTRAVENOUS
Status: DISCONTINUED | OUTPATIENT
Start: 2019-02-26 | End: 2019-02-26 | Stop reason: HOSPADM

## 2019-02-26 RX ORDER — DOPAMINE HYDROCHLORIDE 160 MG/100ML
2-20 INJECTION, SOLUTION INTRAVENOUS CONTINUOUS PRN
Status: DISCONTINUED | OUTPATIENT
Start: 2019-02-26 | End: 2019-02-26 | Stop reason: HOSPADM

## 2019-02-26 RX ORDER — NALOXONE HYDROCHLORIDE 0.4 MG/ML
.2-.4 INJECTION, SOLUTION INTRAMUSCULAR; INTRAVENOUS; SUBCUTANEOUS
Status: DISCONTINUED | OUTPATIENT
Start: 2019-02-26 | End: 2019-02-26 | Stop reason: HOSPADM

## 2019-02-26 RX ORDER — EPTIFIBATIDE 2 MG/ML
180 INJECTION, SOLUTION INTRAVENOUS EVERY 10 MIN PRN
Status: DISCONTINUED | OUTPATIENT
Start: 2019-02-26 | End: 2019-02-26 | Stop reason: HOSPADM

## 2019-02-26 RX ORDER — FENTANYL CITRATE 50 UG/ML
INJECTION, SOLUTION INTRAMUSCULAR; INTRAVENOUS
Status: DISCONTINUED | OUTPATIENT
Start: 2019-02-26 | End: 2019-02-26 | Stop reason: HOSPADM

## 2019-02-26 RX ORDER — DOBUTAMINE HYDROCHLORIDE 200 MG/100ML
2-20 INJECTION INTRAVENOUS CONTINUOUS PRN
Status: DISCONTINUED | OUTPATIENT
Start: 2019-02-26 | End: 2019-02-26 | Stop reason: HOSPADM

## 2019-02-26 ASSESSMENT — MIFFLIN-ST. JEOR: SCORE: 1717.63

## 2019-02-26 NOTE — PROCEDURES
PRELIMINARY CARDIAC CATH REPORT:     PROCEDURES PERFORMED:   1. Selective left and right coronary angiography.  2. Intravascular ultrasound (IVUS) of the left main    PHYSICIANS:  1. Attending Interventional Cardiology Staff: Jagdish Hoyt MD  2. Interventional Cardiology Fellow: Mainor Farfan MD     INDICATION:  Frandy Workman is a 54 year old male with no known coronary artery disease and risk factors of HTN, HLD, and DM who presents on an  elective outpatient  Basis for coronary angiography to evaluate liver transplant candidacy.      DESCRIPTION:  1. Consent obtained with discussion of risks.  All questions were answered.  2. Sterile prep and procedure.  3. Location:  right radial artery  4. Access: Local anesthetic with lidocaine.  A micropuncture (21 g) needle with ultrasound guidance was used to establish vascular access using a modified Seldinger technique.  5. Sheath: 6Fr slender  6. Catheters: 5Fr Deirc, 6Fr XB3.5 guide.  7. Fluoroscopy time of 11.5 min.  8. Estimated blood loss of <5 mL.  9. See below for procedure details.    MEDICATIONS:  1. Contrast 120 mL IV.  2. Conscious sedation with fentanyl 50 mcg and midazolam 2 mg.  3. Heparin, nicardipine, and nitroglycerin intra-arterial for radial artery spasm prophylaxis.    SEDATION START TIME: 0851   SEDATION END TIME: 0929  TOTAL SEDATION TIME: 38 min   Heart rate, BP, respiration, oxygen saturation and patient responses were monitored throughout the procedure with the assistance of the RN under my supervision.    CORONARY ANGIOGRAM:   1. Both coronary arteries arise from their respective cusps.  2. Right dominant.     3. Left main is a large caliber vessel and trbifurcates into an LAD, ramus intermedius, and LCx. Ostial LM has an eccentric 40-50% stenosis and the distal LM has a 30% stenosis.  4. LAD is a large caliber vessel, supplies the entire apex (type 3), and gives rise to septal perforators and large caliber D1. Mid LAD has a 40%  stenosis after D1 and the apical LAD has mild (<25% disease).  5. Left circumflex is a large caliber vessel and gives rise to large caliber OM1 and medium caliber OM2. OM1 has a 30% stenosis and the remainder of the LCx has minimal luminal irregularities.  6. Ramus intermedius is a moderate caliber vessel. There are minimal luminal irregularities.  7. RCA is a large caliber vessel and supplies a PDA and gives rise to PL branches. Mid RCA has a 20% stenosis and the remainder of the vessel has minimal luminal irregularities.    INTRA-VASCULAR ULTRASOUND:  Adequate anticoagulation was was obtained.  A Runthrough wire was positioned in the apical LAD.  A [facility] IVUS catheter was used for the imaging evaluation of the left main.  Left main minimal lumen area of 7.2 mm2 (ostial LM).    COMPLICATIONS:  1. None    SUMMARY:   1. Severe liver disease undergoing transplant evaluation.  2. Moderate coronary artery disease of the ostial left main (40-50% stenosis) which does not appear to be flow-limiting based on minimal luminal area of 7.2mm2 by IVUS.  3. Radial artery sheath removed and hemostasis achieved with a TR band.    PLAN:   1. No indication for revascularization prior to liver transplant.  2. Aspirin 81 mg po daily lifelong.  3. Continue statin. If ok with the hepatology team would recommend changing to high-intensity statin.  4. Bedrest per protocol.  5. Discharge today per protocol.    6. Continued medical management and lifestyle modification for cardiovascular risk factor optimization.     The attending interventional cardiologist was present for the entire procedure.    Findings discussed with the primary outpatient cardiology attending Dr. Ireland.    See CVIS report for final draft.      Mainor Farfan MD  Interventional Cardiology Fellow

## 2019-02-26 NOTE — PROGRESS NOTES
Arrived from home for a CORS as a work up for possible liver transplant.  VSS.  Denies pain.  PIV placed, labs resulted.  Consent obtained.  Ready for procedure.

## 2019-02-26 NOTE — PROGRESS NOTES
Patient discharged home. PIV removed. Patient has all belongings, understands and agrees with discharge instructions.     1-patient is tolerating liquids and foods-yes  2- ambulating-yes  3- urinating-yes  4- puncture sites are stable ( no bleeding and no hematoma)-yes  5- and patient has a -yes  6-IF Vital Signs are within the patient's normal limits or systolic blood pressure greater than 90 mmHg and less than 160 mmHg-yes  7-Patient is alert and oriented-yes  8-If diabetic, blood glucose is in patient's usual normal range-yes

## 2019-02-26 NOTE — TELEPHONE ENCOUNTER
Prior Authorization Specialty Medication Request    Medication/Dose: Xifaxan 550mg bid  ICD code (if different than what is on RX):  K72.90, Hepatic Encephalopathy   Previously Tried and Failed:      Important Lab Values:   Rationale:     Insurance Name: Yuri  Insurance ID:   Insurance Phone Number:     Pharmacy Information (if different than what is on RX)  Name:  [facility]  Phone:  [phone]

## 2019-02-26 NOTE — DISCHARGE INSTRUCTIONS
Care of wrist or arm site:         It is normal to have soreness at the puncture site and mild tingling in your hand for up to 3 days.           Remove the Band-Aid after 24 hours. If there is minor oozing, apply another Band-aid and remove it after 12 hours.          Do NOT take a bath, or use a hot tub or pool for the next 48 hours. You may shower.          It is normal to have a small bruise.  There should not be a lump at the site.         Do not scrub the site.         Do not use lotion or powder near the puncture site for 3 days.         For 2 days, do not use your hand or arm to support your weight (such as rising from a chair) or bend your wrist (such as lifting a garage door).         For 2 days, do not lift more than 5 pounds or exercise your arm (tennis, golf or bowling).    If you start bleeding from the site in your arm: Sit down and press firmly on the site with your fingers for 10 minutes. Call your doctor as soon as you can.

## 2019-02-27 LAB — INTERPRETATION ECG - MUSE: NORMAL

## 2019-02-28 ENCOUNTER — OFFICE VISIT (OUTPATIENT)
Dept: ENDOCRINOLOGY | Facility: CLINIC | Age: 54
End: 2019-02-28
Payer: MEDICARE

## 2019-02-28 ENCOUNTER — OFFICE VISIT (OUTPATIENT)
Dept: OPHTHALMOLOGY | Facility: CLINIC | Age: 54
End: 2019-02-28
Attending: OPHTHALMOLOGY
Payer: MEDICARE

## 2019-02-28 DIAGNOSIS — E11.3213 TYPE 2 DIABETES MELLITUS WITH MILD NONPROLIFERATIVE RETINOPATHY OF BOTH EYES AND MACULAR EDEMA, UNSPECIFIED WHETHER LONG TERM INSULIN USE (H): ICD-10-CM

## 2019-02-28 DIAGNOSIS — E11.49 TYPE II OR UNSPECIFIED TYPE DIABETES MELLITUS WITH NEUROLOGICAL MANIFESTATIONS, NOT STATED AS UNCONTROLLED(250.60) (H): ICD-10-CM

## 2019-02-28 DIAGNOSIS — E11.69 TYPE 2 DIABETES MELLITUS WITH DIABETIC FOOT DEFORMITY (H): ICD-10-CM

## 2019-02-28 DIAGNOSIS — M21.969 TYPE 2 DIABETES MELLITUS WITH DIABETIC FOOT DEFORMITY (H): ICD-10-CM

## 2019-02-28 DIAGNOSIS — L60.2 ONYCHAUXIS: Primary | ICD-10-CM

## 2019-02-28 PROCEDURE — 92134 CPTRZ OPH DX IMG PST SGM RTA: CPT | Mod: ZF | Performed by: OPHTHALMOLOGY

## 2019-02-28 PROCEDURE — G0463 HOSPITAL OUTPT CLINIC VISIT: HCPCS | Mod: ZF

## 2019-02-28 ASSESSMENT — CONF VISUAL FIELD
OS_NORMAL: 1
OD_NORMAL: 1
METHOD: COUNTING FINGERS

## 2019-02-28 ASSESSMENT — EXTERNAL EXAM - LEFT EYE: OS_EXAM: NORMAL

## 2019-02-28 ASSESSMENT — TONOMETRY
OD_IOP_MMHG: 14
IOP_METHOD: TONOPEN
OS_IOP_MMHG: 10

## 2019-02-28 ASSESSMENT — SLIT LAMP EXAM - LIDS
COMMENTS: NORMAL
COMMENTS: SMALL PAPILLOMA ALONG LL MARGIN

## 2019-02-28 ASSESSMENT — VISUAL ACUITY
OS_CC: 20/25
CORRECTION_TYPE: GLASSES
OD_PH_CC: 20/30
OD_PH_CC+: +2
METHOD: SNELLEN - LINEAR
OD_CC: 20/40

## 2019-02-28 ASSESSMENT — REFRACTION_WEARINGRX
SPECS_TYPE: PAL
OD_CYLINDER: +0.75
OD_AXIS: 132
OD_SPHERE: -7.25
OS_AXIS: 040
OD_ADD: +2.00
OS_SPHERE: -7.25
OS_ADD: +2.00
OS_CYLINDER: +0.75

## 2019-02-28 ASSESSMENT — CUP TO DISC RATIO
OD_RATIO: 0.25
OS_RATIO: 0.3

## 2019-02-28 ASSESSMENT — EXTERNAL EXAM - RIGHT EYE: OD_EXAM: NORMAL

## 2019-02-28 NOTE — LETTER
2/28/2019     RE: Frandy Workman  [address] [facility] [address]     Dear Colleague,    Thank you for referring your patient, Frandy Workman, to the [facility] ENDOCRINOLOGY at [facility]. Please see a copy of my visit note below.    Past Medical History:   Diagnosis Date     Anemia 2013    Low blood plates current is 37     Arthritis      BPH (benign prostatic hyperplasia)      Cholelithiasis      Conductive hearing loss 8/16/2017    Have a lump on my right side of my face.  Had wax discharge     Depressive disorder 1986    Suffer effects throughout life     Gastroesophageal reflux disease 12/1/2014    Being treated with Prilosac     HCC (hepatocellular carcinoma) (H) 1/22/2019     Hepatitis 2014    Diagnosed with schrosis ESTRADA in 2014.  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      Patient Active Problem List   Diagnosis     Hepatic cirrhosis (H)     Type II diabetes mellitus (H)     Bipolar affective disorder in remission (H)     Esophageal varices (H)     Nonalcoholic steatohepatitis (ESTRADA)     Brow ptosis     Paralytic lagophthalmos of right upper eyelid     Erectile dysfunction due to diseases classified elsewhere     Skin lesion     HCC (hepatocellular carcinoma) (H)     Pneumothorax     Acute pain of both shoulders     Fever of unknown origin     Equivocal stress echocardiogram     Other cirrhosis of liver (H)     Type 2 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, unspecified whether long term insulin use (H)     Abnormal findings diagnostic imaging of heart and coronary circulation     Other ill-defined heart diseases     Status post coronary angiogram     Past Surgical History:   Procedure Laterality Date     COLONOSCOPY      2015     ESOPHAGOSCOPY,  GASTROSCOPY, DUODENOSCOPY (EGD), COMBINED N/A 11/17/2016    Procedure: COMBINED ESOPHAGOSCOPY, GASTROSCOPY, DUODENOSCOPY (EGD);  Surgeon: Santi Rosas MD;  Location: [facility]     ESOPHAGOSCOPY, GASTROSCOPY, DUODENOSCOPY (EGD), COMBINED N/A 11/17/2017    Procedure: COMBINED ESOPHAGOSCOPY, GASTROSCOPY, DUODENOSCOPY (EGD);  EGD;  Surgeon: Santi Rosas MD;  Location: [facility]     ESOPHAGOSCOPY, GASTROSCOPY, DUODENOSCOPY (EGD), COMBINED N/A 12/28/2018    Procedure: EGD;  Surgeon: Santi Rosas MD;  Location:  OR     HEAD & NECK SURGERY      12/2017 at [facility].      IMPLANT GOLD WEIGHT EYELID Right 11/16/2017    Procedure: IMPLANT WEIGHT EYELID;  Right Upper Eyelid Weight, right tarsal strip lower eyelid;  Surgeon: Milana Malave MD;  Location:  OR     IR CHEMO EMBOLIZATION  1/22/2019     KNEE SURGERY Left      ORTHOPEDIC SURGERY       PAROTIDECTOMY, RADICAL NECK DISSECTION Right 11/2/2017    Procedure: PAROTIDECTOMY, RADICAL NECK DISSECTION;  Right Superfacial Parotidectomy , Facial nerve repair. with [facility] facial nerve monitor.;  Surgeon: Asiya Morgan MD;  Location: [facility] OR     PICC INSERTION Left 11/06/2017    4fr SL BioFlo PICC, 44cm in the L basilic vein w/ tip in the low SVC     VASCULAR SURGERY       Social History     Socioeconomic History     Marital status:      Spouse name: Not on file     Number of children: Not on file     Years of education: Not on file     Highest education level: Not on file   Occupational History     Not on file   Social Needs     Financial resource strain: Not on file     Food insecurity:     Worry: Not on file     Inability: Not on file     Transportation needs:     Medical: Not on file     Non-medical: Not on file   Tobacco Use     Smoking status: Never Smoker     Smokeless tobacco: Former User     Types: Chew     Tobacco comment: 1 tin per week   Substance and Sexual Activity     Alcohol use: No     Alcohol/week: 0.0 oz     Comment: quit Sept.       Drug use: No     Sexual activity: Not Currently     Partners: Female     Birth control/protection: Condom   Lifestyle     Physical activity:     Days per week: Not on file     Minutes per session: Not on file     Stress: Not on file   Relationships     Social connections:     Talks on phone: Not on file     Gets together: Not on file     Attends Pent[employer]ism service: Not on file     Active member of club or organization: Not on file     Attends meetings of clubs or organizations: Not on file     Relationship status: Not on file     Intimate partner violence:     Fear of current or ex partner: Not on file     Emotionally abused: Not on file     Physically abused: Not on file     Forced sexual activity: Not on file   Other Topics Concern     Parent/sibling w/ CABG, MI or angioplasty before 65F 55M? Yes   Social History Narrative     Not on file     Family History   Problem Relation Age of Onset     Prostate Cancer Maternal Grandfather      Substance Abuse Maternal Grandfather         Alcohol     Colon Cancer Father 60     Pancreatic Cancer Father 60     Prostate Cancer Father      Colorectal Cancer Father      Macular Degeneration Father      Cancer Father      Glaucoma Father      Skin Cancer Father      Colorectal Cancer Maternal Grandmother      Cancer Maternal Grandmother      Substance Abuse Maternal Grandmother         Alcohol     Colorectal Cancer Paternal Grandmother      Cancer Mother      Diabetes Mother          3/2016     Cerebrovascular Disease Mother         Passed away in Feb of this year, 80 years old.     Thyroid Disease Mother      Depression Mother      Asthma Sister         Had since birth     Thyroid Disease Sister      Depression Sister      Liver Disease No family hx of      Melanoma No family hx of      Lab Results   Component Value Date    A1C 6.6 2018    A1C 6.5 2017    A1C 7.8 10/25/2016     SUBJECTIVE FINDINGS:  A 54-year-old male who returns to clinic for diabetic  foot check and toenail care.  He relates that he is doing well.  He  relates he gets numbness and tingling in toes, no ulcers or sores since we seen him last and does not wear Diabetic shoes. Relates to using Econazole cream.     OBJECTIVE FINDINGS:  DP and PT are 2/4 bilaterally.  He has dorsally contracted digits 2 through 5 bilaterally.  He has dorsomedial first MPJ prominence and laterally deviated hallux bilaterally.  Sharp/dull is intact with 5.07 [place] monofilament bilaterally.   He has mild hyperkeratotic tissue build up plantar 2-4 mpjs bilaterally.  There is no erythema, drainage, no odor, no calor bilaterally.  Deep tendon reflexes are intact bilaterally.  There are no gross tendon voids bilaterally.  He has incurvated nails 1 through 5 bilaterally.        ASSESSMENT AND PLAN:  Onychauxis bilaterally.  Diabetes with peripheral neuropathy.    H is protective sensation is intact today.  Diagnosis and treatment discussed with him.  Prescription for Diabetic shoes given and he is given the phone number and address of Orthotics and Prosthetics lab.  He can discontinue the Econazole cream, his Tinea pedis appears resolved.  He will return to clinic and see me in about 3 months.  All the nails were reduced bilaterally upon consent.     Again, thank you for allowing me to participate in the care of your patient.      Sincerely,    Lamin Gonzalez DPM

## 2019-02-28 NOTE — PROGRESS NOTES
CC:  Follow up dme left eye     HPI: Frandy Workman is a  54 year old year-old patient with history of Diabetes mellitus for 24 ys . Patient on insulin.  HBA1c 6.6 08/08/18. Patient was evaluated by dr. Amezquita and sent to the retina clinic for management of Diabetic macular edema     Interval History: Feels that eyes are feeling better, but vision is starting to get worse especially right eye. Last HbA1C 6.1 on 2/11/19.    Retinal Imaging:  OCT 2-28-19  RE: mild extrafoveal intraretinal fluid  -->319  LE: mild extrafovoeal intraretinal fluid -->336    fluorescein angiography transits left eye 08/09/18 :  Right eye: diffuse microaneurysms, late macula leakage; few peripheral capillary non perfusion   Left eye: diffuse microaneurysms, late macula leakage; few peripheral capillary non perfusion     Assessment & Plan:  1.  Moderate nonproliferative diabetic retinopathy of both eyes    - Blood pressure (<120/80) and blood glucose (HbA1c <7.0) control discussed with patient. Patient advised  that failure to adequately control each may lead to vision loss. The patient expressed understanding.    2. Diabetic macular edema left eye    - status post avastin x3, last avastin 10/3/18   - Recurrence of fluid today, could consider close observation vs inject Avastin inj   - patient prefers close observation and follow up in 4 weeks     3. Diabetic macular edema right eye   - new intraretinal fluid right eye   - would recommend treatment today, Avastin #1   - could opt to observe but more symptomatic in this eye          3. Myopia, bilateral  Prescription given last yuval     4. Senile nuclear sclerosis, bilateral  Comment: Not visually significant OU  Plan:  No treatment indicated. Monitor annually.    Planf: follow up 1 months OCT macula, fluorescein angiography transits left eye   possible avastin both eyes     Juanis Davis MD  PGY-3 Ophthalmology    ~~~~~~~~~~~~~~~~~~~~~~~~~~~~~~~~~~   Complete documentation  of historical and exam elements from today's encounter can be found in the full encounter summary report (not reduplicated in this progress note).  I personally obtained the chief complaint(s) and history of present illness.  I confirmed and edited as necessary the review of systems, past medical/surgical history, family history, social history, and examination findings as documented by others; and I examined the patient myself.  I personally reviewed the relevant tests, images, and reports as documented above.  I personally reviewed the ophthalmic test(s) associated with this encounter, agree with the interpretation(s) as documented by the resident/fellow, and have edited the corresponding report(s) as necessary.   I formulated and edited as necessary the assessment and plan and discussed the findings and management plan with the patient and family    Enriqueta Martin MD   of Ophthalmology.  Retina Service   Department of Ophthalmology and Visual Neurosciences   [facility]  Phone: [phone]   Fax: [phone]

## 2019-02-28 NOTE — PROGRESS NOTES
Past Medical History:   Diagnosis Date     Anemia 2013    Low blood plates current is 37     Arthritis      BPH (benign prostatic hyperplasia)      Cholelithiasis      Conductive hearing loss 8/16/2017    Have a lump on my right side of my face.  Had wax discharge     Depressive disorder 1986    Suffer effects throughout life     Gastroesophageal reflux disease 12/1/2014    Being treated with Prilosac     HCC (hepatocellular carcinoma) (H) 1/22/2019     Hepatitis 2014    Diagnosed with schrosis ESTRADA in 2014.  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      Patient Active Problem List   Diagnosis     Hepatic cirrhosis (H)     Type II diabetes mellitus (H)     Bipolar affective disorder in remission (H)     Esophageal varices (H)     Nonalcoholic steatohepatitis (ESTRADA)     Brow ptosis     Paralytic lagophthalmos of right upper eyelid     Erectile dysfunction due to diseases classified elsewhere     Skin lesion     HCC (hepatocellular carcinoma) (H)     Pneumothorax     Acute pain of both shoulders     Fever of unknown origin     Equivocal stress echocardiogram     Other cirrhosis of liver (H)     Type 2 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, unspecified whether long term insulin use (H)     Abnormal findings diagnostic imaging of heart and coronary circulation     Other ill-defined heart diseases     Status post coronary angiogram     Past Surgical History:   Procedure Laterality Date     COLONOSCOPY      2015     ESOPHAGOSCOPY, GASTROSCOPY, DUODENOSCOPY (EGD), COMBINED N/A 11/17/2016    Procedure: COMBINED ESOPHAGOSCOPY, GASTROSCOPY, DUODENOSCOPY (EGD);  Surgeon: Santi Rosas MD;  Location:  GI     ESOPHAGOSCOPY, GASTROSCOPY, DUODENOSCOPY (EGD), COMBINED N/A 11/17/2017    Procedure: COMBINED  ESOPHAGOSCOPY, GASTROSCOPY, DUODENOSCOPY (EGD);  EGD;  Surgeon: aSnti Rosas MD;  Location: [facility]     ESOPHAGOSCOPY, GASTROSCOPY, DUODENOSCOPY (EGD), COMBINED N/A 12/28/2018    Procedure: EGD;  Surgeon: Santi Rosas MD;  Location:  OR     HEAD & NECK SURGERY      12/2017 at [facility].      IMPLANT GOLD WEIGHT EYELID Right 11/16/2017    Procedure: IMPLANT WEIGHT EYELID;  Right Upper Eyelid Weight, right tarsal strip lower eyelid;  Surgeon: Milana Malave MD;  Location:  OR     IR CHEMO EMBOLIZATION  1/22/2019     KNEE SURGERY Left      ORTHOPEDIC SURGERY       PAROTIDECTOMY, RADICAL NECK DISSECTION Right 11/2/2017    Procedure: PAROTIDECTOMY, RADICAL NECK DISSECTION;  Right Superfacial Parotidectomy , Facial nerve repair. with [facility] facial nerve monitor.;  Surgeon: sAiya Morgan MD;  Location: [facility] OR     PICC INSERTION Left 11/06/2017    4fr SL BioFlo PICC, 44cm in the L basilic vein w/ tip in the low SVC     VASCULAR SURGERY       Social History     Socioeconomic History     Marital status:      Spouse name: Not on file     Number of children: Not on file     Years of education: Not on file     Highest education level: Not on file   Occupational History     Not on file   Social Needs     Financial resource strain: Not on file     Food insecurity:     Worry: Not on file     Inability: Not on file     Transportation needs:     Medical: Not on file     Non-medical: Not on file   Tobacco Use     Smoking status: Never Smoker     Smokeless tobacco: Former User     Types: Chew     Tobacco comment: 1 tin per week   Substance and Sexual Activity     Alcohol use: No     Alcohol/week: 0.0 oz     Comment: quit Sept. 1996     Drug use: No     Sexual activity: Not Currently     Partners: Female     Birth control/protection: Condom   Lifestyle     Physical activity:     Days per week: Not on file     Minutes per session: Not on file     Stress: Not on file   Relationships     Social connections:      Talks on phone: Not on file     Gets together: Not on file     Attends Jewish service: Not on file     Active member of club or organization: Not on file     Attends meetings of clubs or organizations: Not on file     Relationship status: Not on file     Intimate partner violence:     Fear of current or ex partner: Not on file     Emotionally abused: Not on file     Physically abused: Not on file     Forced sexual activity: Not on file   Other Topics Concern     Parent/sibling w/ CABG, MI or angioplasty before 65F 55M? Yes   Social History Narrative     Not on file     Family History   Problem Relation Age of Onset     Prostate Cancer Maternal Grandfather      Substance Abuse Maternal Grandfather         Alcohol     Colon Cancer Father 60     Pancreatic Cancer Father 60     Prostate Cancer Father      Colorectal Cancer Father      Macular Degeneration Father      Cancer Father      Glaucoma Father      Skin Cancer Father      Colorectal Cancer Maternal Grandmother      Cancer Maternal Grandmother      Substance Abuse Maternal Grandmother         Alcohol     Colorectal Cancer Paternal Grandmother      Cancer Mother      Diabetes Mother          3/2016     Cerebrovascular Disease Mother         Passed away in Feb of this year, 80 years old.     Thyroid Disease Mother      Depression Mother      Asthma Sister         Had since birth     Thyroid Disease Sister      Depression Sister      Liver Disease No family hx of      Melanoma No family hx of      Lab Results   Component Value Date    A1C 6.6 2018    A1C 6.5 2017    A1C 7.8 10/25/2016     SUBJECTIVE FINDINGS:  A 54-year-old male who returns to clinic for diabetic foot check and toenail care.  He relates that he is doing well.  He relates he gets numbness and tingling in toes, no ulcers or sores since we seen him last and does not wear Diabetic shoes. Relates to using Econazole cream.     OBJECTIVE FINDINGS:  DP and PT are 2/4 bilaterally.   He has dorsally contracted digits 2 through 5 bilaterally.  He has dorsomedial first MPJ prominence and laterally deviated hallux bilaterally.  Sharp/dull is intact with 5.07 [place] monofilament bilaterally.  He has mild hyperkeratotic tissue build up plantar 2-4 mpjs bilaterally.  There is no erythema, drainage, no odor, no calor bilaterally.  Deep tendon reflexes are intact bilaterally.  There are no gross tendon voids bilaterally.  He has incurvated nails 1 through 5 bilaterally.        ASSESSMENT AND PLAN:  Onychauxis bilaterally.  Diabetes with peripheral neuropathy.    His protective sensation is intact today.  Diagnosis and treatment discussed with him.  Prescription for Diabetic shoes given and he is given the phone number and address of Orthotics and Prosthetics lab.  He can discontinue the Econazole cream, his Tinea pedis appears resolved.  He will return to clinic and see me in about 3 months.  All the nails were reduced bilaterally upon consent.

## 2019-02-28 NOTE — TELEPHONE ENCOUNTER
Prior Authorization Not Needed per pharmacy    Medication: Xifaxan 550mg-PA Not Needed  Insurance Company:    Expected CoPay:      Pharmacy Filling the Rx: [facility] [address] [place] [address] - [address] [place] [address] AT [facility]  Pharmacy Notified: Yes  Patient Notified: No    Pharmacy stated PA is not needed. Patient picked up medication on 2/22/18. Disregard request.

## 2019-03-01 ENCOUNTER — ALLIED HEALTH/NURSE VISIT (OUTPATIENT)
Dept: PHARMACY | Facility: CLINIC | Age: 54
End: 2019-03-01
Payer: COMMERCIAL

## 2019-03-01 DIAGNOSIS — K75.81 NASH (NONALCOHOLIC STEATOHEPATITIS): Primary | ICD-10-CM

## 2019-03-01 DIAGNOSIS — K76.82 HEPATIC ENCEPHALOPATHY (H): ICD-10-CM

## 2019-03-01 DIAGNOSIS — I10 BENIGN ESSENTIAL HYPERTENSION: ICD-10-CM

## 2019-03-01 DIAGNOSIS — N40.0 BENIGN PROSTATIC HYPERPLASIA WITHOUT LOWER URINARY TRACT SYMPTOMS: ICD-10-CM

## 2019-03-01 DIAGNOSIS — E11.8 TYPE 2 DIABETES MELLITUS WITH COMPLICATION, UNSPECIFIED WHETHER LONG TERM INSULIN USE: ICD-10-CM

## 2019-03-01 DIAGNOSIS — Z78.9 TAKES DIETARY SUPPLEMENTS: ICD-10-CM

## 2019-03-01 DIAGNOSIS — R52 PAIN: ICD-10-CM

## 2019-03-01 DIAGNOSIS — G47.00 INSOMNIA, UNSPECIFIED TYPE: ICD-10-CM

## 2019-03-01 DIAGNOSIS — E78.5 HYPERLIPIDEMIA LDL GOAL <100: ICD-10-CM

## 2019-03-01 DIAGNOSIS — K21.9 GASTROESOPHAGEAL REFLUX DISEASE, ESOPHAGITIS PRESENCE NOT SPECIFIED: ICD-10-CM

## 2019-03-01 DIAGNOSIS — D64.9 ANEMIA, UNSPECIFIED TYPE: ICD-10-CM

## 2019-03-01 PROCEDURE — 99607 MTMS BY PHARM ADDL 15 MIN: CPT | Performed by: PHARMACIST

## 2019-03-01 PROCEDURE — 99605 MTMS BY PHARM NP 15 MIN: CPT | Performed by: PHARMACIST

## 2019-03-01 RX ORDER — PERPHENAZINE 16 MG
600 TABLET ORAL DAILY
COMMUNITY
End: 2019-04-19

## 2019-03-01 NOTE — PATIENT INSTRUCTIONS
Recommendations from today's MTM visit:                                                      1. Add Vitamin D back into your pill box, your levels are normal, not too high. This will also help increase your calcium levels.     2. If you have pain, you can take a max of 2,000 mg of acetaminophen (Tylenol) per day. Avoid NSAID medications such as ibuprofen (Advil), naproxen (Aleve), or high doses of aspirin - these put you at a high risk of bleeding.     3.  I will talk to Dr. Banuelos and Dr. Wilcox about potentially starting metformin and your goal to eventually stop taking Farxiga.     4. I will talk to Dr. Bowen about switching from olanzapine to trazodone for insomnia.     Next MTM visit: 4/1/19 at 2 PM after appointment with Dr. Banuelos in same clinic.     To schedule another MTM appointment, please call the clinic directly or you may call the MTM scheduling line at [phone] or toll-free at [phone].     My Clinical Pharmacist's contact information:                                                      It was a pleasure talking with you today!  Please feel free to contact me with any questions or concerns you have.      Donald Bradley, PharmD  MTM Pharmacist    Phone: [phone]     You may receive a survey about the MTM services you received by email and/or US Mail.  I would appreciate your feedback to help me serve you better in the future. Your comments will be anonymous.

## 2019-03-01 NOTE — PROGRESS NOTES
SUBJECTIVE/OBJECTIVE:                Frandy Workman is a 54 year old male called for a transitions of care visit.  He was discharged from [facility] on 2/26/19 for Angiogram.     Chief Complaint:  Would like to get off Farxiga, it is very expensive.     Allergies/ADRs: Reviewed in Epic  Tobacco: History of tobacco dependence - quit 2017   Alcohol: not currently using  Caffeine: 1-3 cups/day of coffee  Activity: none, pt is disabled due to liver disease    Medication Adherence/Access:  Patient uses pill box(es).  Per patient, misses medication 0 times per week. Rarely misses doses.     ESTRADA: Currently Child-Clark A. Pt received angiogram to evaluate liver transplant candidacy.    Hepatic Encephalopathy: Pt is taking Lactulose 45mL BID, Rifamixin 550mg BID, if he gets foggy he increases it to 4x times daily. Every other month this occurs. BM: 3-5.    GERD: Current medications include: Prilosec (omeprazole) 40 mg once daily. Patient feels that current regimen is effective, he rarely experiences heartburn symptoms.    Insomnia: Current medications include: olanzapine 2.5 mg at bedtime. Pt states that this is not very effective. He does not remember what he has taken before for insomnia.     Pain: Pt has been avoiding APAP due to perceived liver risk. Has taking IBU in the past, but is not currently taking.     Hyperlipidemia: Current therapy includes Pravastatin 20mg once daily .  Pt reports muscle pains from time to time in thighs. Severe cramping in thighs overnight once every 3 weeks.   The 10-year ASCVD risk score (Bisbeeedison HAWKINS Jr., et al., 2013) is: 9%    Values used to calculate the score:      Age: 54 years      Sex: Male      Is Non- : No      Diabetic: Yes      Tobacco smoker: No      Systolic Blood Pressure: 111 mmHg      Is BP treated: No      HDL Cholesterol: 26 mg/dL      Total Cholesterol: 131 mg/dL    Hypertension: Current medications include Carvedilol 12.5mg BID.  Patient does not  self-monitor BP.  Patient reports no current medication side effects.  BP Readings from Last 3 Encounters:   02/26/19 111/65   02/25/19 110/64   02/11/19 118/67     Diabetes:  Pt currently taking Farxiga 10mg daily, Tresiba 90 units daily (reduces this by 20 units when fasting), Novolog sliding scale . Pt is not experiencing side effects.   SMBG: two times daily, four times daily. Ranges (patient reported): AM , -140  Patient is experiencing hypoglycemia. Frequency of hypoglycemia? 4 times this week. Symptoms of low blood sugar? Sweaty, lightheadedness.  Recent symptoms of high blood sugar? none  Lab Results   Component Value Date    UMALCR 6.48 08/08/2018      Aspirin: Taking 81mg daily and denies side effects    Anemia: Pt is taking Ferrous Sulfate 325mg TID.  Hemoglobin   Date Value Ref Range Status   02/26/2019 13.4 13.3 - 17.7 g/dL Final     Supplement: Pt is taking a daily MVI, Potassium 10mEq louie,y and alpha lipoic acid 600mg once daily (pt does not know why he is taking this).     BPH: Pt is taking Tamsulosin 0.4mg daily. No issues going to the bathroom.     Today's Vitals: There were no vitals taken for this visit.    ASSESSMENT:                 Current medications were reviewed today.      Medication Adherence: good, no issues identified    ESTRADA: Stable.     Hepatic Enchephalopathy: Stable.     GERD: Stable    Insomnia: Needs Improvement. Since the pt does not think that olanzapine is helping him sleep, he would benefit from a different/first line medication for insomnia such as trazodone. Additionally, olanzapine is not an ideal medication for the pt due to metabolic effects, which can worsen diabetes and cause weight gain. Discussed talking with Dr. Bowen to potentially change olanzapine to trazodone.    Pain: Stable. The pt has been avoiding acetaminophen due to its potential effects on the liver. Discussed capping the dose to 2,000 mg daily if he does need pain relief.     Hyperlipidemia:  Stable.     Hypertension: Stable. The pt's blood pressure readings have been at goal of <130/80 (pert 2017 ACC/AHA hypertension guidelines).     Diabetes: Needs improvement. Although the pt's last A1c from 2/11/19 was at goal of <7%, he may benefit from starting at a low dose of metformin. Metformin can help control blood sugar and decrease the need for insulin, which can lead to weight loss (and improve pt's with fatty liver disease). Additionally, metformin may also decrease the need for Farxiga, which the pt would ideally like to discontinue due to cost.     Anemia: Stable.     Dietary Supplements: Needs improvement. The pt's vitamin D levels have been below 75 and his calcium levels have been a little low. Discussed the benefits of taking daily vitamin D which will ultimately increase his calcium levels.     BPH: Stable.    PLAN:                Post Discharge Medication Reconciliation Status: discharge medications reconciled and changed, per note/orders (see AVS).    I will...  1. Talk to Dr. Banuelos and Dr. Wilcox about potentially starting metformin.  2. Talk to Dr. Bowen about switching from olanzapine to trazodone for insomnia.     Pt to...   1. Add Vitamin D back into the pill box   2. Take a max of 2,000 mg of acetaminophen (Tylenol) per day if he experiences pain. Avoid NSAID medications such as ibuprofen (Advil), naproxen (Aleve), or high doses of aspirin.    I spent 45 minutes with this patient today. I offer these suggestions for consideration by PCP/ Hepatology/ Endocrine. A copy of the visit note was provided to the patient's primary care provider.    Will follow up in 1 month.    The patient was sent via [employer] a summary of these recommendations as an after visit summary.    Sharona Grewal, PharmD Student    Donald Bradley, PharmD  [facility] Pharmacist    Phone: [phone]

## 2019-03-04 LAB — COPATH REPORT: NORMAL

## 2019-03-04 RX ORDER — METFORMIN HCL 500 MG
500 TABLET, EXTENDED RELEASE 24 HR ORAL
Qty: 90 TABLET | Refills: 1 | Status: SHIPPED | OUTPATIENT
Start: 2019-03-04 | End: 2019-08-28

## 2019-03-04 NOTE — PROGRESS NOTES
He used to be on prior to 2016, but was eventually discontinued after acute liver failure episode.     I am not opposed to restarting it at 500 mg daily     Thanks   Jennifer Wilcox MD   [id number]   Endocrinology Service    Previous Messages      ----- Message -----   From: Donald Bradley [facility]   Sent: 3/1/2019   3:18 PM   To: Santi Dotson MD, *     Dr. Banuelos and Dr. Wilcox,     I saw our mutual patient for a medication review post hospitalization. I have one recommendation to improve diabetes and possibly his ESTRADA.     Although there is a supposed risk that Metformin will increase Lactic acidosis in cirrhosis, in the literature there seems to be a mortality benefit when Metformin is used in the setting of ESTRADA/ fatty liver disease if kidney function is preserved as it is with this patient.     I would like to trial a low dose Metformin ER with slow taper long term goal of reducing insulin use/ Farxiga use. I just wanted to run this by both of you first.     Thank you, let me know what you think,     Donald Bradley, PharmD   [facility] Pharmacist     Phone: [phone]         Santi Rosas MD Griffin, Peter Alberto, [facility]; Jennifer Wilcox MD             I am fine with it.  The risk of lactic acidosis in cirrhotic patients is overstated and I have never had issues with it.  Metformin won't do much for his ESTRADA.     ThanksMt    Previous Messages      ----- Message -----   From: Donald Bradley [facility]   Sent: 3/1/2019   3:18 PM   To: Santi Dotson MD, *     Dr. Banuelos and Dr. Wilcox,     I saw our mutual patient for a medication review post hospitalization. I have one recommendation to improve diabetes and possibly his ESTRDAA.     Although there is a supposed risk that Metformin will increase Lactic acidosis in cirrhosis, in the literature there seems to be a mortality benefit when Metformin is used in the setting of ESTRADA/ fatty liver disease if kidney function is  preserved as it is with this patient.     I would like to trial a low dose Metformin ER with slow taper long term goal of reducing insulin use/ Farxiga use. I just wanted to run this by both of you first.     Thank you, let me know what you think,     Donald Bradley PharmD   [facility] Pharmacist     Phone: [phone]         Pt will start Metformin ER 500mg daily. If BG return <100 in the morning consistently, pt instructed to drop Tresiba to 80 units daily and call me. Will consider increasing Metformin on 4/1/19 at next appointment.    Donald Bradley PharmD  [facility] Pharmacist    Phone: [phone]

## 2019-03-07 DIAGNOSIS — G47.00 INSOMNIA, UNSPECIFIED TYPE: ICD-10-CM

## 2019-03-07 RX ORDER — TRAZODONE HYDROCHLORIDE 50 MG/1
25-50 TABLET, FILM COATED ORAL AT BEDTIME
Qty: 30 TABLET | Refills: 3 | Status: SHIPPED | OUTPATIENT
Start: 2019-03-07 | End: 2019-03-07

## 2019-03-07 NOTE — PROGRESS NOTES
Natalie Chun MD Griffin, Peter Alberto, [facility]             It would be fantastic if you could make this change. Thanks!    Previous Messages      ----- Message -----   From: Donald Bradley [facility]   Sent: 3/1/2019   3:15 PM   To: MD Dr. Andrés Cook,     I saw your patient for a med review post hospitalization. He has been taking Olanzapine for sleep, but does not believe it is having an effect. I would like to avoid this class of medications due to weight gain which may worsen diabetes and his ESTRADA.     Could consider low dose Trazodone for treatment. If you agree with this plan I can make the changes and contact patient.     Thank you!     Donald Bradley, PharmD   [facility] Pharmacist     Phone: [phone]

## 2019-03-10 RX ORDER — TRAZODONE HYDROCHLORIDE 50 MG/1
50 TABLET, FILM COATED ORAL AT BEDTIME
Qty: 90 TABLET | Refills: 0 | Status: SHIPPED | OUTPATIENT
Start: 2019-03-10 | End: 2019-06-06

## 2019-03-12 ENCOUNTER — TELEPHONE (OUTPATIENT)
Dept: INTERNAL MEDICINE | Facility: CLINIC | Age: 54
End: 2019-03-12

## 2019-03-12 ENCOUNTER — CARE COORDINATION (OUTPATIENT)
Dept: CARDIOLOGY | Facility: CLINIC | Age: 54
End: 2019-03-12

## 2019-03-12 NOTE — PROGRESS NOTES
Discharge follow up post CORS-    What does the site look like? Patient states that radial site is healing well and just a little bruising      Recommended patient to have a post CORS follow up with an SERA within the next 4 weeks if possible.  Patient is scheduled for s/p CORS with Bam Ruiz 03/28/2019.     Reviewed with patient below-     After you go home:    You may have small amounts of bruising or discomfort, this is expected. If you develop worse swelling, redness and warmth that does not go away, fever and chills, Increasing numbness in your legs, worsening pain at the site then you need to contact your nurse coordinator.     You may shower, but do not soak in a bath tub or pool or apply lotions, ointments, powder, etc for 3-5 days or until sites look healed.     Activity: No lifting, pushing, pulling more than 10 pounds (examples to avoid: groceries, vacuuming, gardening, golfing): For one week with a procedure through the groin.     Have an adult stay with you for 24 hours.    Drink plenty of fluids.    You may eat your normal diet, unless your doctor tells you otherwise.      For 24 hours:    Relax and take it easy.      Do NOT smoke.    Do NOT make any important or legal decisions.    Do NOT drive or operate machines at home or at work.    Do NOT drink alcohol.    Remove the Band-Aid after 24 hours. If there is minor oozing, apply another Band-aid and remove it after 12  Hours.      For 2 days, do NOT have sex or do any heavy exercise.    Do NOT take a bath, or use a hot tub or pool for at least 3 days. You may shower.    Care of groin site  It is normal to have a small bruise or lump at the site.    Do not scrub the site.    For the first 2 days: Do not stoop or squat. When you cough, sneeze or move your bowels, hold your hand over  the puncture site and press gently.    Do not lift more than 10 pounds for at least 3 to 5 days.    Do not use lotion or powder near the puncture site for 3 days.    If you  start bleeding from the site in your groin, lie down flat and press firmly  on the site. Call your doctor as soon as you can.    Medicines    If you have started taking Plavix or Effient, do not stop taking it until you talk to your heart doctor  (cardiologist).    If you are on metformin (Glucophage), do not restart it until you have blood tests (within 2 to 3 days after  discharge). When your doctor tells you it is safe, you may restart the metformin.    If you have stopped any other medicines, check with your nurse or provider about when to restart them.    Call 911 right away if you have bleeding that is heavy or does not stop.    Call your doctor if:    You have a large or growing hard lump around the site.    The site is red, swollen, hot or tender.    Blood or fluid is draining from the site.    You have chills or a fever greater than 101 F (38 C).    Your leg or arm feels numb or cool.    You have hives, a rash or unusual itching.    CONTACT INFORMATION  Please feel free to call us with any other questions or symptoms that are concerning for you at [phone] if it is after 4:30 in the afternoon, or a weekend please call [phone] and ask for the on call specialist.  We want to do everything we can to help prevent you needing to return to the ED, so please do not hesitate to call us.    Patient states understanding and agrees to call with any questions or concerns.

## 2019-03-12 NOTE — TELEPHONE ENCOUNTER
Health Call Center    Phone Message    May a detailed message be left on voicemail: yes, Wendy with [facility] in [place] is wanting a call back at her confidential line at [phone] or faxed at [phone]    Reason for Call: Other: [facility] in [place], is needing form filled out for Pts medical [employer] Medicare Form, she stated she had faxed it on 3/8/2019.     Action Taken: Message routed to:  [facility] ([facility] pcc

## 2019-03-12 NOTE — TELEPHONE ENCOUNTER
Spoke to Wendy from  [facility] to relay the form is in the providers box to be signed and that our forms policy is 5-7 business days.Wendy states that the form will need to be filled out before the patient will be able to receive his orthotics. Patient has an appointment scheduled for next week to  orthotics and will have to reschedule if form is not filled out and faxed. Rosalinda Tijerina LPN 3/12/2019 10:46 AM

## 2019-03-14 ENCOUNTER — MEDICAL CORRESPONDENCE (OUTPATIENT)
Dept: HEALTH INFORMATION MANAGEMENT | Facility: CLINIC | Age: 54
End: 2019-03-14

## 2019-03-15 ENCOUNTER — DOCUMENTATION ONLY (OUTPATIENT)
Dept: TRANSPLANT | Facility: CLINIC | Age: 54
End: 2019-03-15

## 2019-03-17 DIAGNOSIS — E87.6 HYPOKALEMIA: ICD-10-CM

## 2019-03-17 DIAGNOSIS — K76.82 HEPATIC ENCEPHALOPATHY (H): ICD-10-CM

## 2019-03-17 DIAGNOSIS — K21.9 GASTROESOPHAGEAL REFLUX DISEASE, ESOPHAGITIS PRESENCE NOT SPECIFIED: Primary | ICD-10-CM

## 2019-03-18 ENCOUNTER — OFFICE VISIT (OUTPATIENT)
Dept: INTERNAL MEDICINE | Facility: CLINIC | Age: 54
End: 2019-03-18
Payer: MEDICARE

## 2019-03-18 VITALS
DIASTOLIC BLOOD PRESSURE: 65 MMHG | HEART RATE: 81 BPM | OXYGEN SATURATION: 95 % | BODY MASS INDEX: 27.51 KG/M2 | WEIGHT: 188.9 LBS | SYSTOLIC BLOOD PRESSURE: 117 MMHG | TEMPERATURE: 98 F | RESPIRATION RATE: 16 BRPM

## 2019-03-18 DIAGNOSIS — R07.81 PLEURITIC CHEST PAIN: Primary | ICD-10-CM

## 2019-03-18 RX ORDER — OMEPRAZOLE 40 MG/1
40 CAPSULE, DELAYED RELEASE ORAL DAILY
Qty: 90 CAPSULE | Refills: 0 | Status: SHIPPED | OUTPATIENT
Start: 2019-03-18 | End: 2019-06-13

## 2019-03-18 RX ORDER — POTASSIUM CHLORIDE 1500 MG/1
20 TABLET, EXTENDED RELEASE ORAL DAILY
Qty: 90 TABLET | Refills: 3 | Status: ON HOLD | OUTPATIENT
Start: 2019-03-18 | End: 2019-11-20

## 2019-03-18 ASSESSMENT — PAIN SCALES - GENERAL: PAINLEVEL: MODERATE PAIN (5)

## 2019-03-18 NOTE — PROGRESS NOTES
"                     PRIMARY CARE CENTER       SUBJECTIVE:  Frandy Workman is a 54 year old male with a PMHx of  DM type II, ESTRADA cirrhosis, hepaocellular carcinoma s/p TACE (1/2018), and bipolar who comes in for 6 month follow up.     Had coronary angiogram on 2/26. He says this was the second attempt at this procedure. The first one wasn't completed because he had taken carvedilol. From his TACE he says that there was a complication where the wall between his lung and liver was perforated. He says lately he has been having right sided chest pain and cough during. It has been very painful for last month. Starts from right side and moves towards front of right chest.  Relieved by moving around and drinking. It is a sharp pain that comes and goes, like \"10 sharp pains\".  Accompanied by SOB.Takes tylenol occasionally as a pain reliever, but never takes more than 500mg.     He is having a lot of gas that been going on since Jan. Has pain b/l lower quadrants of stomach. He describes it as pain around the muscle.     Started taking trazodone a few weeks ago. Helps him get initial 3-4 hours of sleep. After he is restless and can't sleep and up and down for every few hours. His sleep pattern has been like this since Jan.     Endorses vision changes and was recently diagnosed with cataracts. Has moderate non proliferative diabetic retinopathy and diabetic macular edema  followed by Dr. Martin.     Medications and allergies reviewed by me today.     ROS:   Constitutional, HEENT, cardiovascular, pulmonary, gi and gu systems are negative, except as otherwise noted.    OBJECTIVE:    There were no vitals taken for this visit.   Wt Readings from Last 1 Encounters:   02/26/19 88.5 kg (195 lb)       GENERAL APPEARANCE: healthy, alert and no distress     HENT: ear canals and TM's normal and nose and mouth without ulcers or lesions     RESP: lungs clear to auscultation - no rales, rhonchi or wheezes     CV: regular rates and " rhythm, normal S1 S2, no S3 or S4 and no murmur, click or rub     ABDOMEN:  soft, nontender, no HSM or masses and bowel sounds normal     MS: extremities normal- no gross deformities noted, no evidence of inflammation in joints, FROM in all extremities.     SKIN: no suspicious lesions or rashes     NEURO:  mentation intact and speech normal     PSYCH: mentation appears normal. and affect normal/bright        ASSESSMENT/PLAN:    Frandy was seen today for follow up.     Diagnoses and all orders for this visit:    Pleuritic chest pain  For the last 1 month patient reports pleuritic chest pain and cough most prominent at night. He endorses musculoskeletal pain  in that area as well. Suspect this pain is secondary to recent instrumentation as patient had TACE in the last month. Location of pain is consistent with referred pain secondary to diaphragmatic irritation. Expect pain to improve with time for healing.   -     diclofenac (VOLTAREN) 1 % topical gel; Place 2 g onto the skin 4 times daily    Pt should return to clinic for f/u with Dr. Bowen in 6 months.     Carey Lilly MD  Internal Medicine, PGY-1  Mar 18, 2019    Pt was seen and plan of care discussed with  Dr. Bowen.      Attestation:  I, Natalie Russell, saw this patient with the resident and agree with the resident s findings and plan of care as documented in the resident s note.      Natalie Russell MD

## 2019-03-18 NOTE — PATIENT INSTRUCTIONS
[facility] Medication Refill Request Information:  * Please contact your pharmacy regarding ANY request for medication refills.  ** [facility] Prescription Fax = [phone]  * Please allow 3 business days for routine medication refills.  * Please allow 5 business days for controlled substance medication refills.     [facility] Test Result notification information:  *You will be notified with in 7-10 days of your appointment day regarding the results of your test.  If you are on MyChart you will be notified as soon as the provider has reviewed the results and signed off on them.    [facility] [phone]     Try taking 1mg of melatonin to start; Take in the evening (6PM)

## 2019-03-18 NOTE — NURSING NOTE
Chief Complaint   Patient presents with     Chest Pain     Patient is here for right side chest pain including abdominal pain with gas     Cough     Patient is also here for cough x 1 mo       Adiran Valdez CMA (AAMA) at 1:01 PM on 3/18/2019

## 2019-03-25 ASSESSMENT — ENCOUNTER SYMPTOMS
EYE REDNESS: 0
WEIGHT LOSS: 0
DOUBLE VISION: 0
DECREASED APPETITE: 1
POLYPHAGIA: 0
NIGHT SWEATS: 0
CHILLS: 0
SMELL DISTURBANCE: 0
NECK MASS: 0
EYE IRRITATION: 1
WEIGHT GAIN: 0
FEVER: 0
POLYDIPSIA: 0
EYE WATERING: 0
ALTERED TEMPERATURE REGULATION: 1
FATIGUE: 1
TROUBLE SWALLOWING: 0
SINUS PAIN: 0
INCREASED ENERGY: 0
EYE PAIN: 1
HALLUCINATIONS: 0
SINUS CONGESTION: 1
TASTE DISTURBANCE: 0
SORE THROAT: 0
HOARSE VOICE: 0

## 2019-03-27 DIAGNOSIS — K74.60 CIRRHOSIS OF LIVER (H): Primary | ICD-10-CM

## 2019-03-28 ENCOUNTER — OFFICE VISIT (OUTPATIENT)
Dept: OPHTHALMOLOGY | Facility: CLINIC | Age: 54
End: 2019-03-28
Attending: OPHTHALMOLOGY
Payer: MEDICARE

## 2019-03-28 ENCOUNTER — OFFICE VISIT (OUTPATIENT)
Dept: CARDIOLOGY | Facility: CLINIC | Age: 54
End: 2019-03-28
Attending: INTERNAL MEDICINE
Payer: MEDICARE

## 2019-03-28 VITALS
WEIGHT: 190 LBS | HEIGHT: 69 IN | BODY MASS INDEX: 28.14 KG/M2 | OXYGEN SATURATION: 95 % | SYSTOLIC BLOOD PRESSURE: 97 MMHG | DIASTOLIC BLOOD PRESSURE: 61 MMHG | HEART RATE: 74 BPM

## 2019-03-28 DIAGNOSIS — E11.3293 TYPE 2 DIABETES MELLITUS WITH MILD NONPROLIFERATIVE RETINOPATHY OF BOTH EYES WITHOUT MACULAR EDEMA, UNSPECIFIED WHETHER LONG TERM INSULIN USE (H): ICD-10-CM

## 2019-03-28 DIAGNOSIS — Z98.890 S/P CORONARY ANGIOGRAM: ICD-10-CM

## 2019-03-28 DIAGNOSIS — E11.3213 TYPE 2 DIABETES MELLITUS WITH MILD NONPROLIFERATIVE RETINOPATHY OF BOTH EYES AND MACULAR EDEMA, UNSPECIFIED WHETHER LONG TERM INSULIN USE (H): Primary | ICD-10-CM

## 2019-03-28 DIAGNOSIS — E11.3213 TYPE 2 DIABETES MELLITUS WITH MILD NONPROLIFERATIVE RETINOPATHY OF BOTH EYES AND MACULAR EDEMA, UNSPECIFIED WHETHER LONG TERM INSULIN USE (H): ICD-10-CM

## 2019-03-28 PROCEDURE — 67028 INJECTION EYE DRUG: CPT | Mod: RT,ZF | Performed by: OPHTHALMOLOGY

## 2019-03-28 PROCEDURE — C9257 BEVACIZUMAB INJECTION: HCPCS | Mod: ZF | Performed by: OPHTHALMOLOGY

## 2019-03-28 PROCEDURE — G0463 HOSPITAL OUTPT CLINIC VISIT: HCPCS | Mod: ZF,25,27

## 2019-03-28 PROCEDURE — 25000128 H RX IP 250 OP 636: Mod: ZF | Performed by: OPHTHALMOLOGY

## 2019-03-28 PROCEDURE — 99213 OFFICE O/P EST LOW 20 MIN: CPT | Mod: ZP | Performed by: NURSE PRACTITIONER

## 2019-03-28 PROCEDURE — 92235 FLUORESCEIN ANGRPH MLTIFRAME: CPT | Mod: ZF | Performed by: OPHTHALMOLOGY

## 2019-03-28 PROCEDURE — G0463 HOSPITAL OUTPT CLINIC VISIT: HCPCS | Mod: ZF

## 2019-03-28 PROCEDURE — 92134 CPTRZ OPH DX IMG PST SGM RTA: CPT | Mod: ZF | Performed by: OPHTHALMOLOGY

## 2019-03-28 RX ADMIN — Medication 1.25 MG: at 12:24

## 2019-03-28 ASSESSMENT — TONOMETRY
OS_IOP_MMHG: 17
OD_IOP_MMHG: 17
IOP_METHOD: TONOPEN

## 2019-03-28 ASSESSMENT — PAIN SCALES - GENERAL: PAINLEVEL: NO PAIN (0)

## 2019-03-28 ASSESSMENT — CONF VISUAL FIELD
OS_NORMAL: 1
METHOD: COUNTING FINGERS
OD_NORMAL: 1

## 2019-03-28 ASSESSMENT — VISUAL ACUITY
METHOD: SNELLEN - LINEAR
CORRECTION_TYPE: GLASSES
OD_CC+: -2
OD_PH_CC: 20/30
OS_CC: 20/40
OD_CC: 20/40

## 2019-03-28 ASSESSMENT — EXTERNAL EXAM - RIGHT EYE: OD_EXAM: NORMAL

## 2019-03-28 ASSESSMENT — CUP TO DISC RATIO
OS_RATIO: 0.3
OD_RATIO: 0.25

## 2019-03-28 ASSESSMENT — EXTERNAL EXAM - LEFT EYE: OS_EXAM: NORMAL

## 2019-03-28 ASSESSMENT — SLIT LAMP EXAM - LIDS
COMMENTS: NORMAL
COMMENTS: SMALL PAPILLOMA ALONG LL MARGIN

## 2019-03-28 ASSESSMENT — MIFFLIN-ST. JEOR: SCORE: 1687.21

## 2019-03-28 NOTE — PROGRESS NOTES
CC:  Follow up dme left eye     HPI: Frandy Workman is a  54 year old year-old patient with history of Diabetes mellitus for 24 ys . Patient on insulin.  HBA1c 6.6 08/08/18. Patient was evaluated by dr. Amezquita and sent to the retina clinic for management of Diabetic macular edema     Interval History: Feels that left eye vision is getting worse. Last HbA1C 6.1 on 2/11/19.    Retinal Imaging:  OCT 3-28-19  RE: increased now foveal intraretinal fluid  -->319-->415  LE: worsening of extrafovoeal intraretinal fluid, now with tr subfoveal SRF -->336-->467    fluorescein angiography transits left eye 3-28-19  Right eye: diffuse microaneurysms, late macula leakage; moderate peripheral capillary non perfusion; significant vessel leakage in late phase, no NVD/NVE  Left eye: diffuse microaneurysms, late macula leakage; moderate peripheral capillary non perfusion;  significant vessel leakage in late phase, no NVD/NVE    Optos consistnet with clinical exam    Assessment & Plan:  1.  Severe nonproliferative diabetic retinopathy of both eyes    - Blood pressure (<120/80) and blood glucose (HbA1c <7.0) control discussed with patient. Patient advised  that failure to adequately control each may lead to vision loss. The patient expressed understanding.    2. Diabetic macular edema left eye    - status post avastin x3, last avastin 10/3/18   - Worsening of fluid today, with decreased VA   - would recommend avastin today    3. Diabetic macular edema right eye   - worsening intraretinal fluid right eye, now subfoveal   - VA stable   - would recommend treatment today, Avastin #1          3. Myopia, bilateral  Prescription given last yuval     4. Senile nuclear sclerosis, bilateral  Comment: Not visually significant OU  Plan:  No treatment indicated. Monitor annually.    Plan: follow up 1 month for inj only both eyes   Will check for insurance coverage for  Eylea and Lucentis     Juanis Davis MD  PGY-3  Ophthalmology    ~~~~~~~~~~~~~~~~~~~~~~~~~~~~~~~~~~   Complete documentation of historical and exam elements from today's encounter can be found in the full encounter summary report (not reduplicated in this progress note).  I personally obtained the chief complaint(s) and history of present illness.  I confirmed and edited as necessary the review of systems, past medical/surgical history, family history, social history, and examination findings as documented by others; and I examined the patient myself.  I personally reviewed the relevant tests, images, and reports as documented above.  I personally reviewed the ophthalmic test(s) associated with this encounter, agree with the interpretation(s) as documented by the resident/fellow, and have edited the corresponding report(s) as necessary.   I formulated and edited as necessary the assessment and plan and discussed the findings and management plan with the patient and family and I was present for the entire procedure performed by the resident/fellow.    Enriqueta Martin MD   of Ophthalmology.  Retina Service   Department of Ophthalmology and Visual Neurosciences   [facility]  Phone: [phone]   Fax: [phone]

## 2019-03-28 NOTE — PATIENT INSTRUCTIONS
Patient Instructions:    It was a pleasure to see you in the cardiology clinic today.    If you have any questions you can reach our nurse triage line at [phone].  Press Option #1 for the [facility], then press Option #3 for nursing or Option #1 for scheduling. We also encourage the use of [employer] to communicate with your HealthCare Provider    Note new medications: none  Stop the following medications: no changes    Please follow up with Cardiology in one year if you are still listed for transplant.    Control your risk of coronary artery disease with these four lifestyle changes:  - Eating a heart healthy diet by following the American Heart Association Recommendations: Reduce saturated fat and trans fat to 5-6 percent of daily calories and minimizing the amount of trans fat you eat by limiting your intake of red meat and dairy products made with whole milk. It also means choosing skim milk, low-fat or fat-free dairy products, limiting fried food, and cooking with healthy oils such as vegetable oil. A healthy diet should include emphasis on fruits, vegetables, whole grains, poultry, fish and nuts, and limiting sugary foods and beverages. We recommend following the DASH (Dietary Approaches to Stop Hypertension) or Mediterranean Diet.  - Regular Exercise: Just 40 minutes of aerobic exercise of moderate to vigorous intensity done 3-4 times per week is enough to lower both cholesterol and high blood pressure. Brisk walking, swimming, bicycling or a dance class are examples.  - Avoiding Tobacco Smoking: Smoking compounds the risk from other risk factors for heart disease including high cholesterol, high blood pressure, and diabetes. Smokers can lower cholesterol, blood pressure, and protect their arteries by quitting. Ask to learn more about quitting smoking.  - Losing Weight: Being overweight or obese raises your risk of high cholesterol, high blood pressure, and diabetes which are  all risk factors for heart disease. Losing excess weight can improve cholesterol levels, blood pressure, and reduce incidence of diabetes and potentially reverse these disease processes.     Get help if you experience any of these heart attack warning signs: Although some heart attacks are sudden and intense, most start slowly, with mild pain or discomfort. Pay attention to your body -- and call 911 if you feel:  - Chest discomfort: Most heart attacks involve discomfort in the center of the chest that lasts more than a few minutes, or that goes away and comes back. It can feel like uncomfortable pressure, squeezing, fullness or pain.  - Discomfort in other areas of the upper body: Symptoms can include pain or discomfort in one or both arms, the back, neck, jaw or stomach.   - Shortness of breath with or without chest discomfort   - Other signs may include breaking out in a cold sweat, nausea or lightheadedness      Sincerely,    Juanpablo Ruiz, CNP

## 2019-03-28 NOTE — LETTER
3/28/2019      RE: Frandy Workman  [address] [facility] [address]       Dear Colleague,    Thank you for the opportunity to participate in the care of your patient, Frandy Workman, at the [facility]. Please see a copy of my visit note below.    Chief Complaint:   Chief Complaint   Patient presents with     Follow Up     return pt, s/p CORS per mamta       HPI: Returns to clinic today for follow-up after undergoing coronary angiography as part of liver transplant workup.  His catheterization demonstrated a Nonobstructive 40-50% left main lesion.  A dobutamine echo done prior was nondiagnostic however on the screening echocardiogram there was no significant valve or myocardial abnormalities in the pulmonary pressures reported as normal.  Since undergoing his catheterization he reports he is feeling well with the exception of occasional nighttime cough.  There is no exertional angina or dyspnea.     Past Medical History:  Past Medical History:   Diagnosis Date     Anemia 2013    Low blood plates current is 37     Arthritis      BPH (benign prostatic hyperplasia)      Cholelithiasis      Conductive hearing loss 8/16/2017    Have a lump on my right side of my face.  Had wax discharge     Depressive disorder 1986    Suffer effects throughout life     Gastroesophageal reflux disease 12/1/2014    Being treated with Prilosac     HCC (hepatocellular carcinoma) (H) 1/22/2019     Hepatitis 2014    Diagnosed with schrosis ESTRADA in 2014.  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        Past Surgical History:  Past Surgical History:   Procedure Laterality Date     COLONOSCOPY      2015     CV HEART CATHETERIZATION WITH POSSIBLE INTERVENTION N/A 2/26/2019    Procedure: CORS;  Surgeon: Lai  MD Jagdish;  Location:  HEART CARDIAC CATH LAB     ESOPHAGOSCOPY, GASTROSCOPY, DUODENOSCOPY (EGD), COMBINED N/A 11/17/2016    Procedure: COMBINED ESOPHAGOSCOPY, GASTROSCOPY, DUODENOSCOPY (EGD);  Surgeon: Santi Rosas MD;  Location:  GI     ESOPHAGOSCOPY, GASTROSCOPY, DUODENOSCOPY (EGD), COMBINED N/A 11/17/2017    Procedure: COMBINED ESOPHAGOSCOPY, GASTROSCOPY, DUODENOSCOPY (EGD);  EGD;  Surgeon: Santi Rosas MD;  Location:  GI     ESOPHAGOSCOPY, GASTROSCOPY, DUODENOSCOPY (EGD), COMBINED N/A 12/28/2018    Procedure: EGD;  Surgeon: Santi Rosas MD;  Location:  OR     HEAD & NECK SURGERY      12/2017 at [facility].      IMPLANT GOLD WEIGHT EYELID Right 11/16/2017    Procedure: IMPLANT WEIGHT EYELID;  Right Upper Eyelid Weight, right tarsal strip lower eyelid;  Surgeon: Milana Malave MD;  Location:  OR     IR CHEMO EMBOLIZATION  1/22/2019     KNEE SURGERY Left      ORTHOPEDIC SURGERY       PAROTIDECTOMY, RADICAL NECK DISSECTION Right 11/2/2017    Procedure: PAROTIDECTOMY, RADICAL NECK DISSECTION;  Right Superfacial Parotidectomy , Facial nerve repair. with [facility] facial nerve monitor.;  Surgeon: Asiya Morgan MD;  Location: [facility] OR     PICC INSERTION Left 11/06/2017    4fr SL BioFlo PICC, 44cm in the L basilic vein w/ tip in the low SVC     VASCULAR SURGERY         Social History:  Social History     Tobacco Use     Smoking status: Never Smoker     Smokeless tobacco: Former User     Types: Chew     Tobacco comment: 1 tin per week   Substance Use Topics     Alcohol use: No     Alcohol/week: 0.0 oz     Comment: quit Sept. 1996       Family History:  Family History   Problem Relation Age of Onset     Prostate Cancer Maternal Grandfather      Substance Abuse Maternal Grandfather         Alcohol     Colon Cancer Father 60     Pancreatic Cancer Father 60     Prostate Cancer Father      Colorectal Cancer Father      Macular Degeneration Father      Cancer Father      Glaucoma  Father      Skin Cancer Father      Colorectal Cancer Maternal Grandmother      Cancer Maternal Grandmother      Substance Abuse Maternal Grandmother         Alcohol     Colorectal Cancer Paternal Grandmother      Cancer Mother      Diabetes Mother          3/2016     Cerebrovascular Disease Mother         Passed away in Feb of this year, 80 years old.     Thyroid Disease Mother      Depression Mother      Asthma Sister         Had since birth     Thyroid Disease Sister      Depression Sister      Liver Disease No family hx of      Melanoma No family hx of        Medications:  Current Outpatient Medications   Medication Sig     ACCU-CHEK EDINSON PLUS test strip USE TO TEST BLOOD SUGARS FOUR TIMES DAILY OR AS DIRECTED     Artificial Tear Solution (SM ARTIFICIAL TEARS) SOLN Place 1 drop into the right eye every hour as needed Apply at least 4 times daily and as needed for dry eye     aspirin (ASPIRIN LOW DOSE) 81 MG EC tablet Take 1 tablet (81 mg) by mouth daily     BD VIKTORIA U/F 32G X 4 MM insulin pen needle Use 5 per day     blood glucose monitoring (ACCU-CHEK EDINSON PLUS) test strip USE TO TEST BLOOD SUGARS FOUR TIMES DAILY OR AS DIRECTED     blood glucose monitoring (ACCU-CHEK EDINSON PLUS) test strip USE TO TEST BLOOD SUGARS FOUR TIMES DAILY OR AS DIRECTED     blood glucose monitoring (ACCU-CHEK EDINSON PLUS) test strip Use to test blood sugar 4 times daily     blood glucose monitoring (ACCU-CHEK FASTCLIX) lancets Use to test blood sugar 4 times daily or as directed.  1 box = 102 lancets     carvedilol (COREG) 12.5 MG tablet TAKE 1 TABLET(12.5 MG) BY MOUTH TWICE DAILY WITH MEALS     Cholecalciferol (VITAMIN D-3 PO) Take 2,000 Units by mouth every morning      cyanocobalamin (RA VITAMIN B-12 TR) 1000 MCG TBCR Take 5,000 mcg by mouth every morning      dapagliflozin (FARXIGA) 10 MG TABS tablet Take 1 tablet (10 mg) by mouth daily     diclofenac (VOLTAREN) 1 % topical gel Place 2 g onto the skin 4 times daily      "econazole nitrate 1 % external cream APPLY TOPICALLY DAILY TO FEET AND TOENAILS     ferrous sulfate (FEROSUL) 325 (65 Fe) MG tablet Take 325 mg by mouth 3 times daily (with meals)      insulin degludec (TRESIBA) 200 UNIT/ML pen Take 100 units daily. (Patient taking differently: Take 90 units daily.)     lactulose (CHRONULAC) 10 GM/15ML solution Take 45 mLs (30 g) by mouth 4 times daily     metFORMIN (GLUCOPHAGE-XR) 500 MG 24 hr tablet Take 1 tablet (500 mg) by mouth daily (with breakfast)     Multiple Vitamin (THERAVITE PO) Take 1 tablet by mouth every morning      NOVOLOG FLEXPEN 100 UNIT/ML soln INJECT 1 UNIT PER 4 GRAMS OF CARBS AT MEALS AND SNACKS. CORRECTION SCALE OF 1 UNITS PER 25 OVER [address] A[address] DOSE 75 UNITERS PER DAY     omeprazole (PRILOSEC) 40 MG DR capsule Take 1 capsule (40 mg) by mouth daily     potassium chloride ER (K-DUR/KLOR-CON M) 20 MEQ CR tablet Take 1 tablet (20 mEq) by mouth daily     pravastatin (PRAVACHOL) 20 MG tablet Take 1 tablet (20 mg) by mouth daily     rifaximin (XIFAXAN) 550 MG TABS tablet Take 1 tablet (550 mg) by mouth 2 times daily     rifaximin (XIFAXAN) 550 MG TABS tablet TAKE 1 TABLET(550 MG) BY MOUTH TWICE DAILY     tamsulosin (FLOMAX) 0.4 MG capsule Take 1 capsule (0.4 mg) by mouth daily     traZODone (DESYREL) 50 MG tablet Take 1 tablet (50 mg) by mouth At Bedtime     alpha-lipoic acid 600 MG capsule Take 600 mg by mouth daily     Current Facility-Administered Medications   Medication     bevacizumab (AVASTIN) intravitreal inj 1.25 mg       Review of Systems:  As per HPI otherwise all other systems are negative    Physical Exam:   BP 97/61 (BP Location: Right arm, Patient Position: Chair, Cuff Size: Adult Regular)   Pulse 74   Ht 1.753 m (5' 9\")   Wt 86.2 kg (190 lb)   SpO2 95%   BMI 28.06 kg/m     GEN:patient is in no apparent distress.    HEENT: NC/AT.  Sclerae white, no incertus  Neck: No adenopathy.Carotids brisk bilaterally without bruits.  No jugular venous " distension.   Heart:RRR. Normal S1, S2. No murmur, rub, click, or gallop.   Lungs: Lungs clear to ausculation bilaterally.  No ronchi, wheezes, rales.  Abdomen: Soft, nontender, distended.  Extremities: No clubbing, cyanosis, or edema.  The pulses are 2+ at the bilateral radial, DP, and PT  Neurologic: Awake and alert, normal speech, gait and affect  Skin: No petechiae, purpura or rash noted    Labs:  LIPID RESULTS:  Lab Results   Component Value Date    CHOL 131 02/04/2019    HDL 26 (L) 02/04/2019    LDL 81 02/04/2019    TRIG 117 02/04/2019    NHDL 105 02/04/2019       LIVER ENZYME RESULTS:  Lab Results   Component Value Date    AST 39 02/26/2019    ALT 32 02/26/2019       CBC RESULTS:  Lab Results   Component Value Date    WBC 3.9 (L) 02/26/2019    RBC 3.64 (L) 02/26/2019    HGB 13.4 02/26/2019    HCT 40.2 02/26/2019     (H) 02/26/2019    MCH 36.8 (H) 02/26/2019    MCHC 33.3 02/26/2019    RDW 15.5 (H) 02/26/2019    PLT 57 (L) 02/26/2019       BMP RESULTS:  Lab Results   Component Value Date     02/26/2019    POTASSIUM 4.1 02/26/2019    CHLORIDE 112 (H) 02/26/2019    CO2 22 02/26/2019    ANIONGAP 9 02/26/2019    GLC 87 02/26/2019    BUN 22 02/26/2019    CR 1.20 02/26/2019    GFRESTIMATED 68 02/26/2019    GFRESTBLACK 78 02/26/2019    DEBORAH 8.3 (L) 02/26/2019        A1C RESULTS:  Lab Results   Component Value Date    A1C 6.6 (H) 08/08/2018       INR RESULTS:  Lab Results   Component Value Date    INR 1.36 (H) 02/26/2019    INR 1.39 (H) 02/04/2019       Diagnostics:  Coronary angiography February 26, 2019:      Screening dobutamine echo February 4, 2019:  Screening 2D echocardiogram with Doppler interrogation demonstrated no  significant valvular disease. Aortic root is dilated (4.0 cm at Sinuses of  Valsalva, indexed 2.0 cm/m2). Normal PA systolic pressure (17 mmHg over RA  pressure).    Assessment and Plan:   1.  Preoperative Cardiovascular Evaluation for liver transplant: Cardiac catheterization did not  demonstrate any obstructive disease.  I told him from a cardiovascular perspective he may proceed with liver transplant.  He is currently taking pravastatin and his last LDL was 81.      2.  Hyperlipidemia: Ideally he would be on high intensity statin therapy.  Will defer to hepatology--but we recommend atorvastatin 40 mg or rosuvastatin 20 mg daily.  If these are contraindicated given his liver disease his current dose of pravastatin is acceptable.    3.  History of hepatocellular carcinoma and cirrhosis secondary to ESTRADA: Undergoing liver transplant workup.    He should follow-up on an annual basis if he continues to be listed for transplant.    Juanpablo Ruiz, SHANIQUE CNP  03/28/19  10:23 AM    CC  Patient Care Team:  Natalie Chun MD as PCP - General (Internal Medicine)  Santi Rosas MD as MD (Gastroenterology)  Jennifer Wilcox MD as MD (INTERNAL MEDICINE - ENDOCRINOLOGY, DIABETES & METABOLISM)  Chantal Montejo RN (Diabetes Education)  Julia Lee RD as Registered Dietitian (Nutrition)  Asiya Morgan MD as MD (Otolaryngology)  MANPREET MORALES

## 2019-03-28 NOTE — NURSING NOTE
Chief Complaint(s) and History of Present Illness(es)     Follow Up     In both eyes.  Associated symptoms include eye pain.  Negative for dryness, redness and tearing.              Comments     Pt here for a 1 month f/u for Moderate nonproliferative diabetic retinopathy of both eyes. Pt notes vision has been about the same since last visit. Pt notes some intermittent eye pain (dull ache) in the LE x 3 weeks. Pt's BS today was 126 this am.    LEX Bolaños 10:23 AM March 28, 2019

## 2019-03-28 NOTE — PROGRESS NOTES
Chief Complaint:   Chief Complaint   Patient presents with     Follow Up     return pt, s/p CORS per mamta       HPI: Returns to clinic today for follow-up after undergoing coronary angiography as part of liver transplant workup.  His catheterization demonstrated a Nonobstructive 40-50% left main lesion.  A dobutamine echo done prior was nondiagnostic however on the screening echocardiogram there was no significant valve or myocardial abnormalities in the pulmonary pressures reported as normal.  Since undergoing his catheterization he reports he is feeling well with the exception of occasional nighttime cough.  There is no exertional angina or dyspnea.     Past Medical History:  Past Medical History:   Diagnosis Date     Anemia 2013    Low blood plates current is 37     Arthritis      BPH (benign prostatic hyperplasia)      Cholelithiasis      Conductive hearing loss 8/16/2017    Have a lump on my right side of my face.  Had wax discharge     Depressive disorder 1986    Suffer effects throughout life     Gastroesophageal reflux disease 12/1/2014    Being treated with Prilosac     HCC (hepatocellular carcinoma) (H) 1/22/2019     Hepatitis 2014    Diagnosed with schrosis ESTRADA in 2014.  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        Past Surgical History:  Past Surgical History:   Procedure Laterality Date     COLONOSCOPY      2015     CV HEART CATHETERIZATION WITH POSSIBLE INTERVENTION N/A 2/26/2019    Procedure: CORS;  Surgeon: Jagdish Hoyt MD;  Location:  HEART CARDIAC CATH LAB     ESOPHAGOSCOPY, GASTROSCOPY, DUODENOSCOPY (EGD), COMBINED N/A 11/17/2016    Procedure: COMBINED ESOPHAGOSCOPY, GASTROSCOPY, DUODENOSCOPY (EGD);  Surgeon: Santi Rosas MD;  Location:  GI     ESOPHAGOSCOPY, GASTROSCOPY, DUODENOSCOPY (EGD), COMBINED  N/A 2017    Procedure: COMBINED ESOPHAGOSCOPY, GASTROSCOPY, DUODENOSCOPY (EGD);  EGD;  Surgeon: Santi Rosas MD;  Location: [facility] GI     ESOPHAGOSCOPY, GASTROSCOPY, DUODENOSCOPY (EGD), COMBINED N/A 2018    Procedure: EGD;  Surgeon: Santi Rosas MD;  Location:  OR     HEAD & NECK SURGERY      2017 at [facility].      IMPLANT GOLD WEIGHT EYELID Right 2017    Procedure: IMPLANT WEIGHT EYELID;  Right Upper Eyelid Weight, right tarsal strip lower eyelid;  Surgeon: Milana Malave MD;  Location: [facility] OR     IR CHEMO EMBOLIZATION  2019     KNEE SURGERY Left      ORTHOPEDIC SURGERY       PAROTIDECTOMY, RADICAL NECK DISSECTION Right 2017    Procedure: PAROTIDECTOMY, RADICAL NECK DISSECTION;  Right Superfacial Parotidectomy , Facial nerve repair. with NIM facial nerve monitor.;  Surgeon: Asiya Morgan MD;  Location: [facility] OR     PICC INSERTION Left 2017    4fr SL BioFlo PICC, 44cm in the L basilic vein w/ tip in the low SVC     VASCULAR SURGERY         Social History:  Social History     Tobacco Use     Smoking status: Never Smoker     Smokeless tobacco: Former User     Types: Chew     Tobacco comment: 1 tin per week   Substance Use Topics     Alcohol use: No     Alcohol/week: 0.0 oz     Comment: quit 1996       Family History:  Family History   Problem Relation Age of Onset     Prostate Cancer Maternal Grandfather      Substance Abuse Maternal Grandfather         Alcohol     Colon Cancer Father 60     Pancreatic Cancer Father 60     Prostate Cancer Father      Colorectal Cancer Father      Macular Degeneration Father      Cancer Father      Glaucoma Father      Skin Cancer Father      Colorectal Cancer Maternal Grandmother      Cancer Maternal Grandmother      Substance Abuse Maternal Grandmother         Alcohol     Colorectal Cancer Paternal Grandmother      Cancer Mother      Diabetes Mother          3/2016     Cerebrovascular Disease Mother          Passed away in Feb of this year, 80 years old.     Thyroid Disease Mother      Depression Mother      Asthma Sister         Had since birth     Thyroid Disease Sister      Depression Sister      Liver Disease No family hx of      Melanoma No family hx of        Medications:  Current Outpatient Medications   Medication Sig     ACCU-CHEK EDINSON PLUS test strip USE TO TEST BLOOD SUGARS FOUR TIMES DAILY OR AS DIRECTED     Artificial Tear Solution (SM ARTIFICIAL TEARS) SOLN Place 1 drop into the right eye every hour as needed Apply at least 4 times daily and as needed for dry eye     aspirin (ASPIRIN LOW DOSE) 81 MG EC tablet Take 1 tablet (81 mg) by mouth daily     BD VIKTORIA U/F 32G X 4 MM insulin pen needle Use 5 per day     blood glucose monitoring (ACCU-CHEK EDINSON PLUS) test strip USE TO TEST BLOOD SUGARS FOUR TIMES DAILY OR AS DIRECTED     blood glucose monitoring (ACCU-CHEK EDINSON PLUS) test strip USE TO TEST BLOOD SUGARS FOUR TIMES DAILY OR AS DIRECTED     blood glucose monitoring (ACCU-CHEK EDINSON PLUS) test strip Use to test blood sugar 4 times daily     blood glucose monitoring (ACCU-CHEK FASTCLIX) lancets Use to test blood sugar 4 times daily or as directed.  1 box = 102 lancets     carvedilol (COREG) 12.5 MG tablet TAKE 1 TABLET(12.5 MG) BY MOUTH TWICE DAILY WITH MEALS     Cholecalciferol (VITAMIN D-3 PO) Take 2,000 Units by mouth every morning      cyanocobalamin (RA VITAMIN B-12 TR) 1000 MCG TBCR Take 5,000 mcg by mouth every morning      dapagliflozin (FARXIGA) 10 MG TABS tablet Take 1 tablet (10 mg) by mouth daily     diclofenac (VOLTAREN) 1 % topical gel Place 2 g onto the skin 4 times daily     econazole nitrate 1 % external cream APPLY TOPICALLY DAILY TO FEET AND TOENAILS     ferrous sulfate (FEROSUL) 325 (65 Fe) MG tablet Take 325 mg by mouth 3 times daily (with meals)      insulin degludec (TRESIBA) 200 UNIT/ML pen Take 100 units daily. (Patient taking differently: Take 90 units daily.)     lactulose  "(CHRONULAC) 10 GM/15ML solution Take 45 mLs (30 g) by mouth 4 times daily     metFORMIN (GLUCOPHAGE-XR) 500 MG 24 hr tablet Take 1 tablet (500 mg) by mouth daily (with breakfast)     Multiple Vitamin (THERAVITE PO) Take 1 tablet by mouth every morning      NOVOLOG FLEXPEN 100 UNIT/ML soln INJECT 1 UNIT PER 4 GRAMS OF CARBS AT MEALS AND SNACKS. CORRECTION SCALE OF 1 UNITS PER 25 OVER [address]. AVE DOSE 75 UNITERS PER DAY     omeprazole (PRILOSEC) 40 MG DR capsule Take 1 capsule (40 mg) by mouth daily     potassium chloride ER (K-DUR/KLOR-CON M) 20 MEQ CR tablet Take 1 tablet (20 mEq) by mouth daily     pravastatin (PRAVACHOL) 20 MG tablet Take 1 tablet (20 mg) by mouth daily     rifaximin (XIFAXAN) 550 MG TABS tablet Take 1 tablet (550 mg) by mouth 2 times daily     rifaximin (XIFAXAN) 550 MG TABS tablet TAKE 1 TABLET(550 MG) BY MOUTH TWICE DAILY     tamsulosin (FLOMAX) 0.4 MG capsule Take 1 capsule (0.4 mg) by mouth daily     traZODone (DESYREL) 50 MG tablet Take 1 tablet (50 mg) by mouth At Bedtime     alpha-lipoic acid 600 MG capsule Take 600 mg by mouth daily     Current Facility-Administered Medications   Medication     bevacizumab (AVASTIN) intravitreal inj 1.25 mg       Review of Systems:  As per HPI otherwise all other systems are negative    Physical Exam:   BP 97/61 (BP Location: Right arm, Patient Position: Chair, Cuff Size: Adult Regular)   Pulse 74   Ht 1.753 m (5' 9\")   Wt 86.2 kg (190 lb)   SpO2 95%   BMI 28.06 kg/m    GEN:patient is in no apparent distress.    HEENT: NC/AT.  Sclerae white, no incertus  Neck: No adenopathy.Carotids brisk bilaterally without bruits.  No jugular venous distension.   Heart:RRR. Normal S1, S2. No murmur, rub, click, or gallop.   Lungs: Lungs clear to ausculation bilaterally.  No ronchi, wheezes, rales.  Abdomen: Soft, nontender, distended.  Extremities: No clubbing, cyanosis, or edema.  The pulses are 2+ at the bilateral radial, DP, and PT  Neurologic: Awake and alert, " normal speech, gait and affect  Skin: No petechiae, purpura or rash noted    Labs:  LIPID RESULTS:  Lab Results   Component Value Date    CHOL 131 02/04/2019    HDL 26 (L) 02/04/2019    LDL 81 02/04/2019    TRIG 117 02/04/2019    NHDL 105 02/04/2019       LIVER ENZYME RESULTS:  Lab Results   Component Value Date    AST 39 02/26/2019    ALT 32 02/26/2019       CBC RESULTS:  Lab Results   Component Value Date    WBC 3.9 (L) 02/26/2019    RBC 3.64 (L) 02/26/2019    HGB 13.4 02/26/2019    HCT 40.2 02/26/2019     (H) 02/26/2019    MCH 36.8 (H) 02/26/2019    MCHC 33.3 02/26/2019    RDW 15.5 (H) 02/26/2019    PLT 57 (L) 02/26/2019       BMP RESULTS:  Lab Results   Component Value Date     02/26/2019    POTASSIUM 4.1 02/26/2019    CHLORIDE 112 (H) 02/26/2019    CO2 22 02/26/2019    ANIONGAP 9 02/26/2019    GLC 87 02/26/2019    BUN 22 02/26/2019    CR 1.20 02/26/2019    GFRESTIMATED 68 02/26/2019    GFRESTBLACK 78 02/26/2019    DEBORAH 8.3 (L) 02/26/2019        A1C RESULTS:  Lab Results   Component Value Date    A1C 6.6 (H) 08/08/2018       INR RESULTS:  Lab Results   Component Value Date    INR 1.36 (H) 02/26/2019    INR 1.39 (H) 02/04/2019       Diagnostics:  Coronary angiography February 26, 2019:      Screening dobutamine echo February 4, 2019:  Screening 2D echocardiogram with Doppler interrogation demonstrated no  significant valvular disease. Aortic root is dilated (4.0 cm at Sinuses of  Valsalva, indexed 2.0 cm/m2). Normal PA systolic pressure (17 mmHg over RA  pressure).    Assessment and Plan:   1.  Preoperative Cardiovascular Evaluation for liver transplant: Cardiac catheterization did not demonstrate any obstructive disease.  I told him from a cardiovascular perspective he may proceed with liver transplant.  He is currently taking pravastatin and his last LDL was 81.      2.  Hyperlipidemia: Ideally he would be on high intensity statin therapy.  Will defer to hepatology--but we recommend atorvastatin 40  mg or rosuvastatin 20 mg daily.  If these are contraindicated given his liver disease his current dose of pravastatin is acceptable.    3.  History of hepatocellular carcinoma and cirrhosis secondary to ESTRADA: Undergoing liver transplant workup.    He should follow-up on an annual basis if he continues to be listed for transplant.    Juanpablo Ruiz, SHANIQUE CNP  03/28/19  10:23 AM        CC  Patient Care Team:  Natalie Chun MD as PCP - General (Internal Medicine)  Santi Rosas MD as MD (Gastroenterology)  Jennifer Wilcox MD as MD (INTERNAL MEDICINE - ENDOCRINOLOGY, DIABETES & METABOLISM)  Chantal Montejo, RN (Diabetes Education)  Julia Lee RD as Registered Dietitian (Nutrition)  Asiya Morgan MD as MD (Otolaryngology)  MANPREET MORALES

## 2019-04-01 ENCOUNTER — TELEPHONE (OUTPATIENT)
Dept: TRANSPLANT | Facility: CLINIC | Age: 54
End: 2019-04-01

## 2019-04-01 ENCOUNTER — OFFICE VISIT (OUTPATIENT)
Dept: GASTROENTEROLOGY | Facility: CLINIC | Age: 54
End: 2019-04-01
Attending: INTERNAL MEDICINE
Payer: MEDICARE

## 2019-04-01 ENCOUNTER — OFFICE VISIT (OUTPATIENT)
Dept: PHARMACY | Facility: CLINIC | Age: 54
End: 2019-04-01
Payer: COMMERCIAL

## 2019-04-01 VITALS
DIASTOLIC BLOOD PRESSURE: 70 MMHG | WEIGHT: 187.4 LBS | HEIGHT: 69 IN | SYSTOLIC BLOOD PRESSURE: 117 MMHG | OXYGEN SATURATION: 95 % | BODY MASS INDEX: 27.76 KG/M2 | HEART RATE: 71 BPM

## 2019-04-01 DIAGNOSIS — K75.81 NASH (NONALCOHOLIC STEATOHEPATITIS): Primary | ICD-10-CM

## 2019-04-01 DIAGNOSIS — K75.81 LIVER CIRRHOSIS SECONDARY TO NASH (H): ICD-10-CM

## 2019-04-01 DIAGNOSIS — K76.82 HEPATIC ENCEPHALOPATHY (H): ICD-10-CM

## 2019-04-01 DIAGNOSIS — K74.60 LIVER CIRRHOSIS SECONDARY TO NASH (H): ICD-10-CM

## 2019-04-01 DIAGNOSIS — E11.8 TYPE 2 DIABETES MELLITUS WITH COMPLICATION, UNSPECIFIED WHETHER LONG TERM INSULIN USE: ICD-10-CM

## 2019-04-01 DIAGNOSIS — I25.10 CORONARY ARTERY DISEASE INVOLVING NATIVE HEART WITHOUT ANGINA PECTORIS, UNSPECIFIED VESSEL OR LESION TYPE: ICD-10-CM

## 2019-04-01 DIAGNOSIS — K74.60 CIRRHOSIS OF LIVER (H): ICD-10-CM

## 2019-04-01 DIAGNOSIS — E78.5 HYPERLIPIDEMIA LDL GOAL <100: ICD-10-CM

## 2019-04-01 DIAGNOSIS — C22.0 HCC (HEPATOCELLULAR CARCINOMA) (H): ICD-10-CM

## 2019-04-01 DIAGNOSIS — E78.5 HYPERLIPIDEMIA, UNSPECIFIED HYPERLIPIDEMIA TYPE: Primary | ICD-10-CM

## 2019-04-01 DIAGNOSIS — G47.00 INSOMNIA, UNSPECIFIED TYPE: ICD-10-CM

## 2019-04-01 PROBLEM — K74.69 OTHER CIRRHOSIS OF LIVER (H): Status: RESOLVED | Noted: 2019-02-12 | Resolved: 2019-04-01

## 2019-04-01 PROBLEM — R50.9 FEVER OF UNKNOWN ORIGIN: Status: RESOLVED | Noted: 2019-01-25 | Resolved: 2019-04-01

## 2019-04-01 PROBLEM — L98.9 SKIN LESION: Status: RESOLVED | Noted: 2018-09-19 | Resolved: 2019-04-01

## 2019-04-01 PROBLEM — I51.89 OTHER ILL-DEFINED HEART DISEASES: Status: RESOLVED | Noted: 2019-02-12 | Resolved: 2019-04-01

## 2019-04-01 PROBLEM — J93.9 PNEUMOTHORAX: Status: RESOLVED | Noted: 2019-01-24 | Resolved: 2019-04-01

## 2019-04-01 LAB
ALBUMIN SERPL-MCNC: 2.9 G/DL (ref 3.4–5)
ALP SERPL-CCNC: 114 U/L (ref 40–150)
ALT SERPL W P-5'-P-CCNC: 44 U/L (ref 0–70)
ANION GAP SERPL CALCULATED.3IONS-SCNC: 6 MMOL/L (ref 3–14)
AST SERPL W P-5'-P-CCNC: 51 U/L (ref 0–45)
BILIRUB DIRECT SERPL-MCNC: 0.6 MG/DL (ref 0–0.2)
BILIRUB SERPL-MCNC: 1.8 MG/DL (ref 0.2–1.3)
BUN SERPL-MCNC: 19 MG/DL (ref 7–30)
CALCIUM SERPL-MCNC: 9 MG/DL (ref 8.5–10.1)
CHLORIDE SERPL-SCNC: 109 MMOL/L (ref 94–109)
CO2 SERPL-SCNC: 24 MMOL/L (ref 20–32)
CREAT SERPL-MCNC: 1.12 MG/DL (ref 0.66–1.25)
ERYTHROCYTE [DISTWIDTH] IN BLOOD BY AUTOMATED COUNT: 14.5 % (ref 10–15)
GFR SERPL CREATININE-BSD FRML MDRD: 73 ML/MIN/{1.73_M2}
GLUCOSE SERPL-MCNC: 148 MG/DL (ref 70–99)
HCT VFR BLD AUTO: 42.4 % (ref 40–53)
HGB BLD-MCNC: 13.7 G/DL (ref 13.3–17.7)
INR PPP: 1.34 (ref 0.86–1.14)
MCH RBC QN AUTO: 35 PG (ref 26.5–33)
MCHC RBC AUTO-ENTMCNC: 32.3 G/DL (ref 31.5–36.5)
MCV RBC AUTO: 108 FL (ref 78–100)
PLATELET # BLD AUTO: 41 10E9/L (ref 150–450)
POTASSIUM SERPL-SCNC: 4.3 MMOL/L (ref 3.4–5.3)
PROT SERPL-MCNC: 7.3 G/DL (ref 6.8–8.8)
RBC # BLD AUTO: 3.91 10E12/L (ref 4.4–5.9)
SODIUM SERPL-SCNC: 139 MMOL/L (ref 133–144)
WBC # BLD AUTO: 4.2 10E9/L (ref 4–11)

## 2019-04-01 PROCEDURE — 80076 HEPATIC FUNCTION PANEL: CPT | Performed by: INTERNAL MEDICINE

## 2019-04-01 PROCEDURE — 80048 BASIC METABOLIC PNL TOTAL CA: CPT | Performed by: INTERNAL MEDICINE

## 2019-04-01 PROCEDURE — 36415 COLL VENOUS BLD VENIPUNCTURE: CPT | Performed by: INTERNAL MEDICINE

## 2019-04-01 PROCEDURE — G0463 HOSPITAL OUTPT CLINIC VISIT: HCPCS | Mod: ZF

## 2019-04-01 PROCEDURE — 85027 COMPLETE CBC AUTOMATED: CPT | Performed by: INTERNAL MEDICINE

## 2019-04-01 PROCEDURE — 85610 PROTHROMBIN TIME: CPT | Performed by: INTERNAL MEDICINE

## 2019-04-01 PROCEDURE — 99606 MTMS BY PHARM EST 15 MIN: CPT | Performed by: PHARMACIST

## 2019-04-01 RX ORDER — SPIRONOLACTONE 50 MG/1
50 TABLET, FILM COATED ORAL DAILY
Qty: 90 TABLET | Refills: 0 | Status: SHIPPED | OUTPATIENT
Start: 2019-04-01 | End: 2019-09-20

## 2019-04-01 RX ORDER — ROSUVASTATIN CALCIUM 20 MG/1
20 TABLET, COATED ORAL DAILY
Qty: 60 TABLET | Refills: 2 | Status: SHIPPED | OUTPATIENT
Start: 2019-04-01 | End: 2019-09-24

## 2019-04-01 RX ORDER — FUROSEMIDE 20 MG
20 TABLET ORAL DAILY
Qty: 90 TABLET | Refills: 0 | Status: SHIPPED | OUTPATIENT
Start: 2019-04-01 | End: 2019-10-28 | Stop reason: SINTOL

## 2019-04-01 ASSESSMENT — PAIN SCALES - GENERAL: PAINLEVEL: MODERATE PAIN (4)

## 2019-04-01 ASSESSMENT — MIFFLIN-ST. JEOR: SCORE: 1675.42

## 2019-04-01 NOTE — PATIENT INSTRUCTIONS
Plan  1.  Stop pravastatin  2.  Start rosuvastatin/Crestor   - watch out for muscle aches/pains  3.  Start furosemide/Lasix 20mg once per day  4.  Start spironolactone/Aldactone 50mg once per day  5.  Check blood work next week  6.  MRI liver as scheduled  7.  Follow-up with me in 3 months    Santi Banuelos MD  Hepatology  [facility]

## 2019-04-01 NOTE — LETTER
4/1/2019       RE: Frandy Workman  [address] [facility] [address]     Dear Colleague,    Thank you for referring your patient, Frandy Workman, to the [facility] HEPATOLOGY at [facility]. Please see a copy of my visit note below.    [facility]    Hepatology follow-up    Follow-up visit for cirrhosis    Subjective:  54 year old male    Cirrhosis  - dx 2013  - ESTRADA, A1-AT phenotype- PiMZ  - hx HE  - hx ascites  - no hx variceal bleed  - last EGD Dec 2018- small EV, portal hypertensive gastropathy  - HCC screening- see below    HCC  - dx Dec 2018  - MRI liver 12/23/18- 3.1cm lesion segment IVB, 1.1cm lesion segment III  - AFP 12/28/18= 5.2  - bone scan 1/4/19  - CT chest 1/4/19  - TACE 1/22/19 (seg IV)  - Microwave ablation 1/23/19 (seg III)  - last MRI liver 2/22/19  - last AFP 2/26/19= 5.5    The patient comes to clinic this afternoon for follow-up of cirrhosis.  Last clinic visit 10/2018.  The patient underwent EGD in 12/2018 which showed small esophageal varices.  Abdominal ultrasound in Dec 2018 showed a mass in the left lobe of the liver.  MRI liver later in December showed 3.1cm, arterially enhancing lesion in segment 4B of the liver and a 1.1cm lesion in segment 3.  Bone scan and CT chest were negative for malignant disease.  The patient underwent TACE and CT-guided microwave ablation in Jan 2019.  The patient has since been evaluated for a liver transplant which has included coronary angiogram on 02/12/2019: this showed a 40%-50% stenosed lesion in the left main that did not require stenting.  The patient was started on trazodone for insomnia.  He has also been receiving eye injections for management of diabetic nephropathy. The patient denies ER visits or hospital admissions since last clinic visit.      The patient is doing okay today.  He has recently noticed a cough with mild dyspnea at nighttime.  He also reports some right shoulder  pain that has not responded to symptomatic treatment.     The patient is taking lactulose twice per day in addition to rifaximin and reports that his HE is well-controlled.        The patient denies abdominal distention, abdominal discomfort, jaundice, lower extremity edema, lethargy or confusion.      The patient denies chest pain, palpitations or syncope.      The patient denies melena, hematemesis or hematochezia.      The patient denies any fevers, sweats or chills.      Weight has decreased 6lbs which the patient attributes to a change in his eating habits.  Appetite remains the same.       The patient does not drink alcohol.       Medical hx Surgical hx   Past Medical History:   Diagnosis Date     Anemia 2013    Low blood plates current is 37     Arthritis      BPH (benign prostatic hyperplasia)      Cholelithiasis      Conductive hearing loss 8/16/2017    Have a lump on my right side of my face.  Had wax discharge     Depressive disorder 1986    Suffer effects throughout life     Gastroesophageal reflux disease 12/1/2014    Being treated with Prilosac     HCC (hepatocellular carcinoma) (H) 1/22/2019     Hepatitis 2014    Diagnosed with schrosis ESTRADA in 2014.  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       Past Surgical History:   Procedure Laterality Date     COLONOSCOPY      2015     CV HEART CATHETERIZATION WITH POSSIBLE INTERVENTION N/A 2/26/2019    Procedure: CORS;  Surgeon: Jagdish Hoyt MD;  Location:  HEART CARDIAC CATH LAB     ESOPHAGOSCOPY, GASTROSCOPY, DUODENOSCOPY (EGD), COMBINED N/A 11/17/2016    Procedure: COMBINED ESOPHAGOSCOPY, GASTROSCOPY, DUODENOSCOPY (EGD);  Surgeon: Santi Rosas MD;  Location:  GI     ESOPHAGOSCOPY, GASTROSCOPY, DUODENOSCOPY (EGD), COMBINED N/A 11/17/2017    Procedure: COMBINED  ESOPHAGOSCOPY, GASTROSCOPY, DUODENOSCOPY (EGD);  EGD;  Surgeon: Santi Rosas MD;  Location: [facility] GI     ESOPHAGOSCOPY, GASTROSCOPY, DUODENOSCOPY (EGD), COMBINED N/A 12/28/2018    Procedure: EGD;  Surgeon: Santi Rosas MD;  Location:  OR     HEAD & NECK SURGERY      12/2017 at [facility].      IMPLANT GOLD WEIGHT EYELID Right 11/16/2017    Procedure: IMPLANT WEIGHT EYELID;  Right Upper Eyelid Weight, right tarsal strip lower eyelid;  Surgeon: Milana Malave MD;  Location:  OR     IR CHEMO EMBOLIZATION  1/22/2019     KNEE SURGERY Left      ORTHOPEDIC SURGERY       PAROTIDECTOMY, RADICAL NECK DISSECTION Right 11/2/2017    Procedure: PAROTIDECTOMY, RADICAL NECK DISSECTION;  Right Superfacial Parotidectomy , Facial nerve repair. with [facility] facial nerve monitor.;  Surgeon: Asiya Morgan MD;  Location: [facility] OR     PICC INSERTION Left 11/06/2017    4fr SL BioFlo PICC, 44cm in the L basilic vein w/ tip in the low SVC     VASCULAR SURGERY            Medications  Prior to Admission medications    Medication Sig Start Date End Date Taking? Authorizing Provider   ACCU-CHEK EDINSON PLUS test strip USE TO TEST BLOOD SUGARS FOUR TIMES DAILY OR AS DIRECTED 11/13/18  Yes Natalie Chun MD   alpha-lipoic acid 600 MG capsule Take 600 mg by mouth daily   Yes Reported, Patient   Artificial Tear Solution (SM ARTIFICIAL TEARS) SOLN Place 1 drop into the right eye every hour as needed Apply at least 4 times daily and as needed for dry eye 7/2/18  Yes Milana Malave MD   aspirin (ASPIRIN LOW DOSE) 81 MG EC tablet Take 1 tablet (81 mg) by mouth daily 6/6/18  Yes Brenda Delgadillo MD   BD VIKTORIA U/F 32G X 4 MM insulin pen needle Use 5 per day 4/11/18  Yes Jennifer Wilcox MD   blood glucose monitoring (ACCU-CHEK EDINSON PLUS) test strip USE TO TEST BLOOD SUGARS FOUR TIMES DAILY OR AS DIRECTED 12/15/18  Yes Jennifer Wilcox MD   blood glucose monitoring (ACCU-CHEK EDINSON PLUS)  test strip USE TO TEST BLOOD SUGARS FOUR TIMES DAILY OR AS DIRECTED 9/18/18  Yes Jennifer Wilcox MD   blood glucose monitoring (ACCU-CHEK EDINSON PLUS) test strip Use to test blood sugar 4 times daily 10/13/17  Yes Natalie Chun MD   blood glucose monitoring (ACCU-CHEK FASTCLIX) lancets Use to test blood sugar 4 times daily or as directed.  1 box = 102 lancets 10/13/17  Yes Natalie Chun MD   carvedilol (COREG) 12.5 MG tablet TAKE 1 TABLET(12.5 MG) BY MOUTH TWICE DAILY WITH MEALS 10/3/18  Yes Natalie Chun MD   Cholecalciferol (VITAMIN D-3 PO) Take 2,000 Units by mouth every morning    Yes Reported, Patient   cyanocobalamin (RA VITAMIN B-12 TR) 1000 MCG TBCR Take 5,000 mcg by mouth every morning  3/14/16  Yes Reported, Patient   dapagliflozin (FARXIGA) 10 MG TABS tablet Take 1 tablet (10 mg) by mouth daily 8/23/18  Yes Jennifer Wilcox MD   diclofenac (VOLTAREN) 1 % topical gel Place 2 g onto the skin 4 times daily 3/18/19  Yes Natalie Chun MD   econazole nitrate 1 % external cream APPLY TOPICALLY DAILY TO FEET AND TOENAILS 1/15/19  Yes Lamin Gonzalez DPM   ferrous sulfate (FEROSUL) 325 (65 Fe) MG tablet Take 325 mg by mouth 3 times daily (with meals)    Yes Reported, Patient   furosemide (LASIX) 20 MG tablet Take 1 tablet (20 mg) by mouth daily 4/1/19 6/30/19 Yes Santi Rosas MD   insulin degludec (TRESIBA) 200 UNIT/ML pen Take 100 units daily.  Patient taking differently: Take 90 units daily. 7/26/18  Yes Jennifer Wilcox MD   lactulose (CHRONULAC) 10 GM/15ML solution Take 45 mLs (30 g) by mouth 4 times daily 12/27/18  Yes Santi Rosas MD   metFORMIN (GLUCOPHAGE-XR) 500 MG 24 hr tablet Take 1 tablet (500 mg) by mouth daily (with breakfast) 3/4/19  Yes Jennifer Wilcox MD   Multiple Vitamin (THERAVITE PO) Take 1 tablet by mouth every morning  3/14/16  Yes Reported, Patient  "  NOVOLOG FLEXPEN 100 UNIT/ML soln INJECT 1 UNIT PER 4 GRAMS OF CARBS AT MEALS AND SNACKS. CORRECTION SCALE OF 1 UNITS PER 25 OVER [address]. AVE DOSE 75 UNITERS PER DAY 11/21/18  Yes Natalie Chun MD   omeprazole (PRILOSEC) 40 MG DR capsule Take 1 capsule (40 mg) by mouth daily 3/18/19  Yes Natalie Chun MD   potassium chloride ER (K-DUR/KLOR-CON M) 20 MEQ CR tablet Take 1 tablet (20 mEq) by mouth daily 3/18/19  Yes Natalie Chun MD   pravastatin (PRAVACHOL) 20 MG tablet Take 1 tablet (20 mg) by mouth daily 5/21/18  Yes Jennifer Wilcox MD   rifaximin (XIFAXAN) 550 MG TABS tablet TAKE 1 TABLET(550 MG) BY MOUTH TWICE DAILY 2/19/19  Yes Santi Rosas MD   rosuvastatin (CRESTOR) 20 MG tablet Take 1 tablet (20 mg) by mouth daily 4/1/19 9/28/19 Yes Santi Rosas MD   spironolactone (ALDACTONE) 50 MG tablet Take 1 tablet (50 mg) by mouth daily 4/1/19 6/30/19 Yes Santi Rosas MD   tamsulosin (FLOMAX) 0.4 MG capsule Take 1 capsule (0.4 mg) by mouth daily 6/20/18  Yes Natalie Cuhn MD   traZODone (DESYREL) 50 MG tablet Take 1 tablet (50 mg) by mouth At Bedtime 3/10/19  Yes Natalie Chun MD       Allergies  Allergies   Allergen Reactions     Codeine Other (See Comments)     Cannot take due to liver  Cannot tolerate oral narcotics       Review of systems  A 10-point review of systems was negative    Examination  /70   Pulse 71   Ht 1.753 m (5' 9\")   Wt 85 kg (187 lb 6.4 oz)   SpO2 95%   BMI 27.67 kg/m     Body mass index is 27.67 kg/m .    Gen- well, NAD, A+Ox3, normal color  CVS- RRR  RS- reduced air entry right base with reduced vocal resonance and dullness to percuss, no wheeze or crackles  Abd- obese, non-distended, soft, non-tender, no ascites or organomegaly on palpation or percussion, BS+  Extr- hands normal, no LEILA  Skin- no rash or jaundice  Neuro- no asterixis  Psych- normal " mood    Laboratory  Lab Results   Component Value Date     04/01/2019    POTASSIUM 4.3 04/01/2019    CHLORIDE 109 04/01/2019    CO2 24 04/01/2019    BUN 19 04/01/2019    CR 1.12 04/01/2019       Lab Results   Component Value Date    BILITOTAL 1.8 04/01/2019    ALT 44 04/01/2019    AST 51 04/01/2019    ALKPHOS 114 04/01/2019       Lab Results   Component Value Date    ALBUMIN 2.9 04/01/2019    PROTTOTAL 7.3 04/01/2019        Lab Results   Component Value Date    WBC 4.2 04/01/2019    HGB 13.7 04/01/2019     04/01/2019    PLT 41 04/01/2019       Lab Results   Component Value Date    INR 1.34 04/01/2019     Hemoglobin A1c 2/11/19= 6.1%    Radiology  MRI liver Feb 2019 reviewed  EGD Dec 2018 personally reviewed    Assessment  54 year old male under evaluation for liver transplant for decompensated ESTRADA cirrhosis complicated with hepatic encephalopathy, history of ascites and HCC.  MELD-Na= 13 (stable).  Will start diuretics for pleural effusion on physical examination in addition to presence of ascites on MRI liver.  Hepatic encephalopathy controlled on current medication regimen.  Will switch pravastatin to rosuvastatin per cardiology recommendations to optimize management of coronary artery disease.  Up to date with surveillance of esophageal varices.  Awaiting follow-up MRI liver for surveillance of HCC later this month.    Plan  1.  Discontinue pravastatin  2.  Start rosuvastatin 20mg PO Q24  3.  Start furosemide 20mg PO Q24  4.  Start spironolactone 50mg PO Q24  5.  Check BMP next week  6.  Continue lactulose and rifaximin  7.  MRI liver as scheduled  8.  Follow-up in 3 months    Santi Banuelos MD  Hepatology  [facility]    Again, thank you for allowing me to participate in the care of your patient.      Sincerely,    Santi Banuelos MD

## 2019-04-01 NOTE — LETTER
4/1/2019      RE: Frandy Workman  [address]  [facility] [address]       [facility]    Hepatology follow-up    Follow-up visit for cirrhosis    Subjective:  54 year old male    Cirrhosis  - dx 2013  - ESTRADA, A1-AT phenotype- PiMZ  - hx HE  - hx ascites  - no hx variceal bleed  - last EGD Dec 2018- small EV, portal hypertensive gastropathy  - HCC screening- see below    HCC  - dx Dec 2018  - MRI liver 12/23/18- 3.1cm lesion segment IVB, 1.1cm lesion segment III  - AFP 12/28/18= 5.2  - bone scan 1/4/19  - CT chest 1/4/19  - TACE 1/22/19 (seg IV)  - Microwave ablation 1/23/19 (seg III)  - last MRI liver 2/22/19  - last AFP 2/26/19= 5.5    The patient comes to clinic this afternoon for follow-up of cirrhosis.  Last clinic visit 10/2018.  The patient underwent EGD in 12/2018 which showed small esophageal varices.  Abdominal ultrasound in Dec 2018 showed a mass in the left lobe of the liver.  MRI liver later in December showed 3.1cm, arterially enhancing lesion in segment 4B of the liver and a 1.1cm lesion in segment 3.  Bone scan and CT chest were negative for malignant disease.  The patient underwent TACE and CT-guided microwave ablation in Jan 2019.  The patient has since been evaluated for a liver transplant which has included coronary angiogram on 02/12/2019: this showed a 40%-50% stenosed lesion in the left main that did not require stenting.  The patient was started on trazodone for insomnia.  He has also been receiving eye injections for management of diabetic nephropathy. The patient denies ER visits or hospital admissions since last clinic visit.      The patient is doing okay today.  He has recently noticed a cough with mild dyspnea at nighttime.  He also reports some right shoulder pain that has not responded to symptomatic treatment.     The patient is taking lactulose twice per day in addition to rifaximin and reports that his HE is well-controlled.        The patient denies  abdominal distention, abdominal discomfort, jaundice, lower extremity edema, lethargy or confusion.      The patient denies chest pain, palpitations or syncope.      The patient denies melena, hematemesis or hematochezia.      The patient denies any fevers, sweats or chills.      Weight has decreased 6lbs which the patient attributes to a change in his eating habits.  Appetite remains the same.       The patient does not drink alcohol.       Medical hx Surgical hx   Past Medical History:   Diagnosis Date     Anemia 2013    Low blood plates current is 37     Arthritis      BPH (benign prostatic hyperplasia)      Cholelithiasis      Conductive hearing loss 8/16/2017    Have a lump on my right side of my face.  Had wax discharge     Depressive disorder 1986    Suffer effects throughout life     Gastroesophageal reflux disease 12/1/2014    Being treated with Prilosac     HCC (hepatocellular carcinoma) (H) 1/22/2019     Hepatitis 2014    Diagnosed with schrosis ESTRADA in 2014.  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       Past Surgical History:   Procedure Laterality Date     COLONOSCOPY      2015     CV HEART CATHETERIZATION WITH POSSIBLE INTERVENTION N/A 2/26/2019    Procedure: CORS;  Surgeon: Jagdish Hoyt MD;  Location:  HEART CARDIAC CATH LAB     ESOPHAGOSCOPY, GASTROSCOPY, DUODENOSCOPY (EGD), COMBINED N/A 11/17/2016    Procedure: COMBINED ESOPHAGOSCOPY, GASTROSCOPY, DUODENOSCOPY (EGD);  Surgeon: Santi Rosas MD;  Location:  GI     ESOPHAGOSCOPY, GASTROSCOPY, DUODENOSCOPY (EGD), COMBINED N/A 11/17/2017    Procedure: COMBINED ESOPHAGOSCOPY, GASTROSCOPY, DUODENOSCOPY (EGD);  EGD;  Surgeon: Santi Rosas MD;  Location:  GI     ESOPHAGOSCOPY, GASTROSCOPY, DUODENOSCOPY (EGD), COMBINED N/A 12/28/2018    Procedure: EGD;   Surgeon: Santi Rosas MD;  Location:  OR     HEAD & NECK SURGERY      12/2017 at [facility].      IMPLANT GOLD WEIGHT EYELID Right 11/16/2017    Procedure: IMPLANT WEIGHT EYELID;  Right Upper Eyelid Weight, right tarsal strip lower eyelid;  Surgeon: Milana Malave MD;  Location:  OR     IR CHEMO EMBOLIZATION  1/22/2019     KNEE SURGERY Left      ORTHOPEDIC SURGERY       PAROTIDECTOMY, RADICAL NECK DISSECTION Right 11/2/2017    Procedure: PAROTIDECTOMY, RADICAL NECK DISSECTION;  Right Superfacial Parotidectomy , Facial nerve repair. with [facility] facial nerve monitor.;  Surgeon: Asiya Morgan MD;  Location: [facility] OR     PICC INSERTION Left 11/06/2017    4fr SL BioFlo PICC, 44cm in the L basilic vein w/ tip in the low SVC     VASCULAR SURGERY            Medications  Prior to Admission medications    Medication Sig Start Date End Date Taking? Authorizing Provider   ACCU-CHEK EDINSON PLUS test strip USE TO TEST BLOOD SUGARS FOUR TIMES DAILY OR AS DIRECTED 11/13/18  Yes Natalie Chun MD   alpha-lipoic acid 600 MG capsule Take 600 mg by mouth daily   Yes Reported, Patient   Artificial Tear Solution (SM ARTIFICIAL TEARS) SOLN Place 1 drop into the right eye every hour as needed Apply at least 4 times daily and as needed for dry eye 7/2/18  Yes Milana Malave MD   aspirin (ASPIRIN LOW DOSE) 81 MG EC tablet Take 1 tablet (81 mg) by mouth daily 6/6/18  Yes Brenda Delgadillo MD   BD VIKTORIA U/F 32G X 4 MM insulin pen needle Use 5 per day 4/11/18  Yes Jennifer Wilcox MD   blood glucose monitoring (ACCU-CHEK EDINSON PLUS) test strip USE TO TEST BLOOD SUGARS FOUR TIMES DAILY OR AS DIRECTED 12/15/18  Yes Jennifer Wilcox MD   blood glucose monitoring (ACCU-CHEK EDINSON PLUS) test strip USE TO TEST BLOOD SUGARS FOUR TIMES DAILY OR AS DIRECTED 9/18/18  Yes Jennifer Wilcox MD   blood glucose monitoring (ACCU-CHEK EDINSON PLUS) test strip Use to test blood sugar 4 times  daily 10/13/17  Yes Natalie Chun MD   blood glucose monitoring (ACCU-CHEK FASTCLIX) lancets Use to test blood sugar 4 times daily or as directed.  1 box = 102 lancets 10/13/17  Yes aNtalie Chun MD   carvedilol (COREG) 12.5 MG tablet TAKE 1 TABLET(12.5 MG) BY MOUTH TWICE DAILY WITH MEALS 10/3/18  Yes Natalie Chun MD   Cholecalciferol (VITAMIN D-3 PO) Take 2,000 Units by mouth every morning    Yes Reported, Patient   cyanocobalamin (RA VITAMIN B-12 TR) 1000 MCG TBCR Take 5,000 mcg by mouth every morning  3/14/16  Yes Reported, Patient   dapagliflozin (FARXIGA) 10 MG TABS tablet Take 1 tablet (10 mg) by mouth daily 8/23/18  Yes Jennifer Wilcox MD   diclofenac (VOLTAREN) 1 % topical gel Place 2 g onto the skin 4 times daily 3/18/19  Yes Natalie Chun MD   econazole nitrate 1 % external cream APPLY TOPICALLY DAILY TO FEET AND TOENAILS 1/15/19  Yes Lamin Gonzalez DPM   ferrous sulfate (FEROSUL) 325 (65 Fe) MG tablet Take 325 mg by mouth 3 times daily (with meals)    Yes Reported, Patient   furosemide (LASIX) 20 MG tablet Take 1 tablet (20 mg) by mouth daily 4/1/19 6/30/19 Yes Santi Rosas MD   insulin degludec (TRESIBA) 200 UNIT/ML pen Take 100 units daily.  Patient taking differently: Take 90 units daily. 7/26/18  Yes Jennifer Wilcox MD   lactulose (CHRONULAC) 10 GM/15ML solution Take 45 mLs (30 g) by mouth 4 times daily 12/27/18  Yes Santi Rosas MD   metFORMIN (GLUCOPHAGE-XR) 500 MG 24 hr tablet Take 1 tablet (500 mg) by mouth daily (with breakfast) 3/4/19  Yes Jennifer Wilcox MD   Multiple Vitamin (THERAVITE PO) Take 1 tablet by mouth every morning  3/14/16  Yes Reported, Patient   NOVOLOG FLEXPEN 100 UNIT/ML soln INJECT 1 UNIT PER 4 GRAMS OF CARBS AT MEALS AND SNACKS. CORRECTION SCALE OF 1 UNITS PER 25 OVER [address] DOSE 75 UNITERS PER DAY 11/21/18  Yes Natalie Chun MD  "  omeprazole (PRILOSEC) 40 MG DR capsule Take 1 capsule (40 mg) by mouth daily 3/18/19  Yes Natalie Chun MD   potassium chloride ER (K-DUR/KLOR-CON M) 20 MEQ CR tablet Take 1 tablet (20 mEq) by mouth daily 3/18/19  Yes Natalie Chun MD   pravastatin (PRAVACHOL) 20 MG tablet Take 1 tablet (20 mg) by mouth daily 5/21/18  Yes Jennifer Wilcox MD   rifaximin (XIFAXAN) 550 MG TABS tablet TAKE 1 TABLET(550 MG) BY MOUTH TWICE DAILY 2/19/19  Yes Santi Rosas MD   rosuvastatin (CRESTOR) 20 MG tablet Take 1 tablet (20 mg) by mouth daily 4/1/19 9/28/19 Yes Santi Rosas MD   spironolactone (ALDACTONE) 50 MG tablet Take 1 tablet (50 mg) by mouth daily 4/1/19 6/30/19 Yes Santi Rosas MD   tamsulosin (FLOMAX) 0.4 MG capsule Take 1 capsule (0.4 mg) by mouth daily 6/20/18  Yes Natalie Chun MD   traZODone (DESYREL) 50 MG tablet Take 1 tablet (50 mg) by mouth At Bedtime 3/10/19  Yes Natalie Chun MD       Allergies  Allergies   Allergen Reactions     Codeine Other (See Comments)     Cannot take due to liver  Cannot tolerate oral narcotics       Review of systems  A 10-point review of systems was negative    Examination  /70   Pulse 71   Ht 1.753 m (5' 9\")   Wt 85 kg (187 lb 6.4 oz)   SpO2 95%   BMI 27.67 kg/m     Body mass index is 27.67 kg/m .    Gen- well, NAD, A+Ox3, normal color  CVS- RRR  RS- reduced air entry right base with reduced vocal resonance and dullness to percuss, no wheeze or crackles  Abd- obese, non-distended, soft, non-tender, no ascites or organomegaly on palpation or percussion, BS+  Extr- hands normal, no LEILA  Skin- no rash or jaundice  Neuro- no asterixis  Psych- normal mood    Laboratory  Lab Results   Component Value Date     04/01/2019    POTASSIUM 4.3 04/01/2019    CHLORIDE 109 04/01/2019    CO2 24 04/01/2019    BUN 19 04/01/2019    CR 1.12 04/01/2019       Lab Results   Component " Value Date    BILITOTAL 1.8 04/01/2019    ALT 44 04/01/2019    AST 51 04/01/2019    ALKPHOS 114 04/01/2019       Lab Results   Component Value Date    ALBUMIN 2.9 04/01/2019    PROTTOTAL 7.3 04/01/2019        Lab Results   Component Value Date    WBC 4.2 04/01/2019    HGB 13.7 04/01/2019     04/01/2019    PLT 41 04/01/2019       Lab Results   Component Value Date    INR 1.34 04/01/2019     Hemoglobin A1c 2/11/19= 6.1%    Radiology  MRI liver Feb 2019 reviewed  EGD Dec 2018 personally reviewed    Assessment  54 year old male under evaluation for liver transplant for decompensated ESTRADA cirrhosis complicated with hepatic encephalopathy, history of ascites and HCC.  MELD-Na= 13 (stable).  Will start diuretics for pleural effusion on physical examination in addition to presence of ascites on MRI liver.  Hepatic encephalopathy controlled on current medication regimen.  Will switch pravastatin to rosuvastatin per cardiology recommendations to optimize management of coronary artery disease.  Up to date with surveillance of esophageal varices.  Awaiting follow-up MRI liver for surveillance of HCC later this month.    Plan  1.  Discontinue pravastatin  2.  Start rosuvastatin 20mg PO Q24  3.  Start furosemide 20mg PO Q24  4.  Start spironolactone 50mg PO Q24  5.  Check BMP next week  6.  Continue lactulose and rifaximin  7.  MRI liver as scheduled  8.  Follow-up in 3 months    Santi Banuelos MD  Hepatology  [facility]    Santi Banuelos MD

## 2019-04-01 NOTE — TELEPHONE ENCOUNTER
DATE:  4/1/2019   TIME OF RECEIPT FROM LAB:  12:08  LAB TEST:  Platelets  LAB VALUE:  41  RESULTS GIVEN WITH READ-BACK TO (PROVIDER):  Text paged Mauri Torres @ 12:09   TIME LAB VALUE REPORTED TO PROVIDER:   12:10

## 2019-04-01 NOTE — PATIENT INSTRUCTIONS
Recommendations from today's MTM visit:                                                    MTM (medication therapy management) is a service provided by a clinical pharmacist designed to help you get the most of out of your medicines.     1. I'll talk with endocrine concerning lowering your Tresiba or Farxiga.     2. Start following a low sodium diet less than 2000mg daily    Next MTM visit: as needed with med changes.     To schedule another MTM appointment, please call the clinic directly or you may call the MTM scheduling line at [phone] or toll-free at [phone].     My Clinical Pharmacist's contact information:                                                      It was a pleasure talking with you today!  Please feel free to contact me with any questions or concerns you have.      Donald Bradley, PharmALEX  MTM Pharmacist    Phone: [phone]     You may receive a survey about the MTM services you received by email and/or US Mail.  I would appreciate your feedback to help me serve you better in the future. Your comments will be anonymous.

## 2019-04-01 NOTE — NURSING NOTE
"Chief Complaint   Patient presents with     RECHECK     6 month follow up      /70   Pulse 71   Ht 1.753 m (5' 9\")   Wt 85 kg (187 lb 6.4 oz)   SpO2 95%   BMI 27.67 kg/m      Kacy Valiente    "

## 2019-04-01 NOTE — PROGRESS NOTES
SUBJECTIVE/OBJECTIVE:                Frandy Workman is a 54 year old male coming in for a follow up MTM appt.     Chief Complaint:   Follow up from our recent [facility] visit.   1. Talk to Dr. Banuelos and Dr. Wilcox about potentially starting metformin.- started, some diarrhea.   2. Talk to Dr. Bowen about switching from olanzapine to trazodone for insomnia. Started- Trazodone has been a little better than the olanzapine    Allergies/ADRs: Reviewed in Epic  Tobacco: History of tobacco dependence - quit 2017   Alcohol: not currently using  Caffeine: 1-3 cups/day of coffee  Activity: none, pt is disabled due to liver disease. Pt is working towards getting on the transplant list.     Medication Adherence/Access:  Patient uses pill box(es).  Per patient, misses medication 0 times per week. Rarely misses doses.     ESTRADA/ Ascites: Currently Child-Clark A. Pt received angiogram to evaluate liver transplant candidacy. Started on Furosemide 20mg and Spironolactone 50mg daily today. Pt is not following a low sodium diet.     Hepatic Encephalopathy: Pt is taking Lactulose 45mL BID, Rifamixin 550mg BID, if he gets foggy he increases it to 4x times daily. Every other month this occurs. BM: 3-5.    Insomnia: Current medications include: Trazodone 50mg daily- changed from Olanzapine 2.5mg. Pt states that trazodone is moderately more effective than Olanzapine.     Hyperlipidemia: Current therapy includes Crestor 20mg daily, switched from Pravastatin today.  Pt reports muscle pains from time to time in thighs. Severe cramping in thighs overnight once every 3 weeks.   The 10-year ASCVD risk score (Gayathri DC Jr., et al., 2013) is: 8.3%    Values used to calculate the score:      Age: 54 years      Sex: Male      Is Non- : No      Diabetic: Yes      Tobacco smoker: No      Systolic Blood Pressure: 97 mmHg      Is BP treated: Yes      HDL Cholesterol: 26 mg/dL      Total Cholesterol: 131 mg/dL    Diabetes:  Pt currently  taking Farxiga 10mg daily, Metformin ER 500mg daily, Tresiba 90 units daily (reduces this by 20 units when fasting), Novolog sliding scale . Pt is experiencing some loose stools since starting Metformin, but nothing severe.   SMBG: two times daily, four times daily. Ranges (patient reported):  AM   PM   Patient is experiencing hypoglycemia. Frequency of hypoglycemia? 4 times this week. Symptoms of low blood sugar? Sweaty, lightheadedness.  Recent symptoms of high blood sugar? none  Lab Results   Component Value Date    UMALCR 6.48 08/08/2018    Aspirin: Taking 81mg daily and denies side effects  Lab Results   Component Value Date    A1C 6.6 08/08/2018    A1C 6.5 06/09/2017    A1C 7.8 10/25/2016     Today's Vitals: There were no vitals taken for this visit.    ASSESSMENT:                 Current medications were reviewed today.      Medication Adherence: good, no issues identified    ESTRADA/ Ascites: Discussed the importance of a low sodium diet to help control ascites today. Goal <2000mg daily.     Hepatic Encephalopathy: Stable.     Insomnia: Improved on Trazodone vs Olanzapine.     Hyperlipidemia: Stable.     Diabetes:  Needs improvement. Pt having some hypoglycemia in the morning, may need a dose decrease of Farxiga or Tresiba. Will connect endo.    PLAN:                Dr. Jeri DAVILA...  1. Pt has been running from  in the morning over the past week, would you like to decrease Tresiba or Farxiga at this point?     Pt to start following a low sodium diet.     I spent 15 minutes with this patient today. I offer these suggestions for consideration by PCP/ Hepatology/ Endocrine. A copy of the visit note was provided to the patient's primary care provider.    Will follow up 2-3 weeks.    The patient was given a summary of these recommendations as an after visit summary.    Donald Bradley, Aubrey  [facility] Pharmacist    Phone: [phone]

## 2019-04-01 NOTE — PROGRESS NOTES
[facility]    Hepatology follow-up    Follow-up visit for cirrhosis    Subjective:  54 year old male    Cirrhosis  - dx 2013  - ESTRADA, A1-AT phenotype- PiMZ  - hx HE  - hx ascites  - no hx variceal bleed  - last EGD Dec 2018- small EV, portal hypertensive gastropathy  - HCC screening- see below    HCC  - dx Dec 2018  - MRI liver 12/23/18- 3.1cm lesion segment IVB, 1.1cm lesion segment III  - AFP 12/28/18= 5.2  - bone scan 1/4/19  - CT chest 1/4/19  - TACE 1/22/19 (seg IV)  - Microwave ablation 1/23/19 (seg III)  - last MRI liver 2/22/19  - last AFP 2/26/19= 5.5    The patient comes to clinic this afternoon for follow-up of cirrhosis.  Last clinic visit 10/2018.  The patient underwent EGD in 12/2018 which showed small esophageal varices.  Abdominal ultrasound in Dec 2018 showed a mass in the left lobe of the liver.  MRI liver later in December showed 3.1cm, arterially enhancing lesion in segment 4B of the liver and a 1.1cm lesion in segment 3.  Bone scan and CT chest were negative for malignant disease.  The patient underwent TACE and CT-guided microwave ablation in Jan 2019.  The patient has since been evaluated for a liver transplant which has included coronary angiogram on 02/12/2019: this showed a 40%-50% stenosed lesion in the left main that did not require stenting.  The patient was started on trazodone for insomnia.  He has also been receiving eye injections for management of diabetic nephropathy. The patient denies ER visits or hospital admissions since last clinic visit.      The patient is doing okay today.  He has recently noticed a cough with mild dyspnea at nighttime.  He also reports some right shoulder pain that has not responded to symptomatic treatment.     The patient is taking lactulose twice per day in addition to rifaximin and reports that his HE is well-controlled.        The patient denies abdominal distention, abdominal discomfort, jaundice, lower extremity edema,  lethargy or confusion.      The patient denies chest pain, palpitations or syncope.      The patient denies melena, hematemesis or hematochezia.      The patient denies any fevers, sweats or chills.      Weight has decreased 6lbs which the patient attributes to a change in his eating habits.  Appetite remains the same.       The patient does not drink alcohol.       Medical hx Surgical hx   Past Medical History:   Diagnosis Date     Anemia 2013    Low blood plates current is 37     Arthritis      BPH (benign prostatic hyperplasia)      Cholelithiasis      Conductive hearing loss 8/16/2017    Have a lump on my right side of my face.  Had wax discharge     Depressive disorder 1986    Suffer effects throughout life     Gastroesophageal reflux disease 12/1/2014    Being treated with Prilosac     HCC (hepatocellular carcinoma) (H) 1/22/2019     Hepatitis 2014    Diagnosed with schrosis ESTRADA in 2014.  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       Past Surgical History:   Procedure Laterality Date     COLONOSCOPY      2015     CV HEART CATHETERIZATION WITH POSSIBLE INTERVENTION N/A 2/26/2019    Procedure: CORS;  Surgeon: Jagdish Hoyt MD;  Location:  HEART CARDIAC CATH LAB     ESOPHAGOSCOPY, GASTROSCOPY, DUODENOSCOPY (EGD), COMBINED N/A 11/17/2016    Procedure: COMBINED ESOPHAGOSCOPY, GASTROSCOPY, DUODENOSCOPY (EGD);  Surgeon: Santi Rosas MD;  Location:  GI     ESOPHAGOSCOPY, GASTROSCOPY, DUODENOSCOPY (EGD), COMBINED N/A 11/17/2017    Procedure: COMBINED ESOPHAGOSCOPY, GASTROSCOPY, DUODENOSCOPY (EGD);  EGD;  Surgeon: Santi Rosas MD;  Location:  GI     ESOPHAGOSCOPY, GASTROSCOPY, DUODENOSCOPY (EGD), COMBINED N/A 12/28/2018    Procedure: EGD;  Surgeon: Santi Rosas MD;  Location:  OR     HEAD & NECK SURGERY       12/2017 at [facility].      IMPLANT GOLD WEIGHT EYELID Right 11/16/2017    Procedure: IMPLANT WEIGHT EYELID;  Right Upper Eyelid Weight, right tarsal strip lower eyelid;  Surgeon: Milana Malave MD;  Location: [facility] OR     IR CHEMO EMBOLIZATION  1/22/2019     KNEE SURGERY Left      ORTHOPEDIC SURGERY       PAROTIDECTOMY, RADICAL NECK DISSECTION Right 11/2/2017    Procedure: PAROTIDECTOMY, RADICAL NECK DISSECTION;  Right Superfacial Parotidectomy , Facial nerve repair. with [facility] facial nerve monitor.;  Surgeon: Asiya Morgan MD;  Location: [facility] OR     PICC INSERTION Left 11/06/2017    4fr SL BioFlo PICC, 44cm in the L basilic vein w/ tip in the low SVC     VASCULAR SURGERY            Medications  Prior to Admission medications    Medication Sig Start Date End Date Taking? Authorizing Provider   ACCU-CHEK EDINSON PLUS test strip USE TO TEST BLOOD SUGARS FOUR TIMES DAILY OR AS DIRECTED 11/13/18  Yes Natalie Chun MD   alpha-lipoic acid 600 MG capsule Take 600 mg by mouth daily   Yes Reported, Patient   Artificial Tear Solution (SM ARTIFICIAL TEARS) SOLN Place 1 drop into the right eye every hour as needed Apply at least 4 times daily and as needed for dry eye 7/2/18  Yes Milana Malave MD   aspirin (ASPIRIN LOW DOSE) 81 MG EC tablet Take 1 tablet (81 mg) by mouth daily 6/6/18  Yes Brenda Delgadillo MD   BD VIKTORIA U/F 32G X 4 MM insulin pen needle Use 5 per day 4/11/18  Yes Jennifer Wilcox MD   blood glucose monitoring (ACCU-CHEK EDINSON PLUS) test strip USE TO TEST BLOOD SUGARS FOUR TIMES DAILY OR AS DIRECTED 12/15/18  Yes Jennifer Wilcox MD   blood glucose monitoring (ACCU-CHEK EDINSON PLUS) test strip USE TO TEST BLOOD SUGARS FOUR TIMES DAILY OR AS DIRECTED 9/18/18  Yes Jennifer Wilcox MD   blood glucose monitoring (ACCU-CHEK EDINSON PLUS) test strip Use to test blood sugar 4 times daily 10/13/17  Yes Natalie Chun MD   blood glucose monitoring  (ACCU-CHEK FASTCLIX) lancets Use to test blood sugar 4 times daily or as directed.  1 box = 102 lancets 10/13/17  Yes Natalie Chun MD   carvedilol (COREG) 12.5 MG tablet TAKE 1 TABLET(12.5 MG) BY MOUTH TWICE DAILY WITH MEALS 10/3/18  Yes Natalie Chun MD   Cholecalciferol (VITAMIN D-3 PO) Take 2,000 Units by mouth every morning    Yes Reported, Patient   cyanocobalamin (RA VITAMIN B-12 TR) 1000 MCG TBCR Take 5,000 mcg by mouth every morning  3/14/16  Yes Reported, Patient   dapagliflozin (FARXIGA) 10 MG TABS tablet Take 1 tablet (10 mg) by mouth daily 8/23/18  Yes Jennifer Wilcox MD   diclofenac (VOLTAREN) 1 % topical gel Place 2 g onto the skin 4 times daily 3/18/19  Yes Natalie Chun MD   econazole nitrate 1 % external cream APPLY TOPICALLY DAILY TO FEET AND TOENAILS 1/15/19  Yes Lamin Gonzalez DPM   ferrous sulfate (FEROSUL) 325 (65 Fe) MG tablet Take 325 mg by mouth 3 times daily (with meals)    Yes Reported, Patient   furosemide (LASIX) 20 MG tablet Take 1 tablet (20 mg) by mouth daily 4/1/19 6/30/19 Yes Santi Rosas MD   insulin degludec (TRESIBA) 200 UNIT/ML pen Take 100 units daily.  Patient taking differently: Take 90 units daily. 7/26/18  Yes Jennifer Wilcox MD   lactulose (CHRONULAC) 10 GM/15ML solution Take 45 mLs (30 g) by mouth 4 times daily 12/27/18  Yes Santi Rosas MD   metFORMIN (GLUCOPHAGE-XR) 500 MG 24 hr tablet Take 1 tablet (500 mg) by mouth daily (with breakfast) 3/4/19  Yes Jennifer Wilcox MD   Multiple Vitamin (THERAVITE PO) Take 1 tablet by mouth every morning  3/14/16  Yes Reported, Patient   NOVOLOG FLEXPEN 100 UNIT/ML soln INJECT 1 UNIT PER 4 GRAMS OF CARBS AT MEALS AND SNACKS. CORRECTION SCALE OF 1 UNITS PER 25 OVER [address] DOSE 75 UNITERS PER DAY 11/21/18  Yes Natalie Chun MD   omeprazole (PRILOSEC) 40 MG DR capsule Take 1 capsule (40 mg) by mouth daily  "3/18/19  Yes Natalie Chun MD   potassium chloride ER (K-DUR/KLOR-CON M) 20 MEQ CR tablet Take 1 tablet (20 mEq) by mouth daily 3/18/19  Yes Natalie Chun MD   pravastatin (PRAVACHOL) 20 MG tablet Take 1 tablet (20 mg) by mouth daily 5/21/18  Yes Jennifer Wiclox MD   rifaximin (XIFAXAN) 550 MG TABS tablet TAKE 1 TABLET(550 MG) BY MOUTH TWICE DAILY 2/19/19  Yes Santi Rosas MD   rosuvastatin (CRESTOR) 20 MG tablet Take 1 tablet (20 mg) by mouth daily 4/1/19 9/28/19 Yes Santi Rosas MD   spironolactone (ALDACTONE) 50 MG tablet Take 1 tablet (50 mg) by mouth daily 4/1/19 6/30/19 Yes Santi Rosas MD   tamsulosin (FLOMAX) 0.4 MG capsule Take 1 capsule (0.4 mg) by mouth daily 6/20/18  Yes Natalie Chun MD   traZODone (DESYREL) 50 MG tablet Take 1 tablet (50 mg) by mouth At Bedtime 3/10/19  Yes Natalie Chun MD       Allergies  Allergies   Allergen Reactions     Codeine Other (See Comments)     Cannot take due to liver  Cannot tolerate oral narcotics       Review of systems  A 10-point review of systems was negative    Examination  /70   Pulse 71   Ht 1.753 m (5' 9\")   Wt 85 kg (187 lb 6.4 oz)   SpO2 95%   BMI 27.67 kg/m    Body mass index is 27.67 kg/m .    Gen- well, NAD, A+Ox3, normal color  CVS- RRR  RS- reduced air entry right base with reduced vocal resonance and dullness to percuss, no wheeze or crackles  Abd- obese, non-distended, soft, non-tender, no ascites or organomegaly on palpation or percussion, BS+  Extr- hands normal, no LEILA  Skin- no rash or jaundice  Neuro- no asterixis  Psych- normal mood    Laboratory  Lab Results   Component Value Date     04/01/2019    POTASSIUM 4.3 04/01/2019    CHLORIDE 109 04/01/2019    CO2 24 04/01/2019    BUN 19 04/01/2019    CR 1.12 04/01/2019       Lab Results   Component Value Date    BILITOTAL 1.8 04/01/2019    ALT 44 04/01/2019    AST 51 04/01/2019    " ALKPHOS 114 04/01/2019       Lab Results   Component Value Date    ALBUMIN 2.9 04/01/2019    PROTTOTAL 7.3 04/01/2019        Lab Results   Component Value Date    WBC 4.2 04/01/2019    HGB 13.7 04/01/2019     04/01/2019    PLT 41 04/01/2019       Lab Results   Component Value Date    INR 1.34 04/01/2019     Hemoglobin A1c 2/11/19= 6.1%    Radiology  MRI liver Feb 2019 reviewed  EGD Dec 2018 personally reviewed    Assessment  54 year old male under evaluation for liver transplant for decompensated ESTRADA cirrhosis complicated with hepatic encephalopathy, history of ascites and HCC.  MELD-Na= 13 (stable).  Will start diuretics for pleural effusion on physical examination in addition to presence of ascites on MRI liver.  Hepatic encephalopathy controlled on current medication regimen.  Will switch pravastatin to rosuvastatin per cardiology recommendations to optimize management of coronary artery disease.  Up to date with surveillance of esophageal varices.  Awaiting follow-up MRI liver for surveillance of HCC later this month.    Plan  1.  Discontinue pravastatin  2.  Start rosuvastatin 20mg PO Q24  3.  Start furosemide 20mg PO Q24  4.  Start spironolactone 50mg PO Q24  5.  Check BMP next week  6.  Continue lactulose and rifaximin  7.  MRI liver as scheduled  8.  Follow-up in 3 months    Santi Banuelos MD  Hepatology  [facility]

## 2019-04-02 ENCOUNTER — COMMITTEE REVIEW (OUTPATIENT)
Dept: TRANSPLANT | Facility: CLINIC | Age: 54
End: 2019-04-02

## 2019-04-02 NOTE — COMMITTEE REVIEW
Abdominal Committee Review Note     Evaluation Date: 2/5/2019  Committee Review Date: 4/2/2019    Organ being evaluated for: Liver    Transplant Phase: Evaluation  Transplant Status: Active    Transplant Coordinator: Gustavo Torres Jr.  Transplant Surgeon:   Uma Moreno    Referring Physician: Natalie Russell    Primary Diagnosis: Primary Liver Malignancy: Hepatoma (HCC) and Cirrhosis  Secondary Diagnosis: Cirrhosis: Fatty Liver (Bergeron)    Committee Review Members:  Nutrition Alisha Snatiago, RD   Pharmacy Juanpablo Clark, [facility]    - Clinical ALEXYS Wang, Maday Kulkarni, [facility]   Transplant Chelita Novak, CHAVEZ, Radha Ndiaye, CHAVEZ, Jr Gustavo Torres RN, Jennifer Jara MD, Shaniqua Dillon, APRN CNP, Mary Beth Gonzales, RN, Rani Perez, RN, Kacey Mcdonnell MD, Yonathan Chino MD, Berlin Hernandez MD       Transplant Eligibility: Cirrhosis with MELD, BERGERON, HCC    Committee Review Decision: Approved    Relative Contraindications: None    Absolute Contraindications: None    Committee Chair Jennifer Jara MD verbally attested to the committee's decision.    Committee Discussion Details: Approved

## 2019-04-03 NOTE — PROGRESS NOTES
Jennifer Wilcox MD Griffin, Peter Alberto, [facility]             I would go with tresiba first by about 10%   Thanks   Jennifer Wilcox MD   [id number]   Endocrinology Service]    Previous Messages      ----- Message -----   From: Donald Bradley, [facility]   Sent: 4/1/2019   2:46 PM   To: MD Dr. Jeri Arguelles,     [facility]'s fasting blood sugars have been running from  in the morning for the past week, would you like to taper Tresiba or Farxiga?     Thanks, let me know!     Donald Bradley PharmD   [facility] Pharmacist     Phone: [phone]             Spoke with Miller- will reduce Tresiba to 81 Units daily.     Donald Bradley PharmALEX  [facility] Pharmacist    Phone: [phone]

## 2019-04-08 ASSESSMENT — ENCOUNTER SYMPTOMS
DYSPNEA ON EXERTION: 0
HEMOPTYSIS: 0
WHEEZING: 0
COUGH DISTURBING SLEEP: 1
POSTURAL DYSPNEA: 1
SPUTUM PRODUCTION: 0
SNORES LOUDLY: 0
SHORTNESS OF BREATH: 1
COUGH: 1

## 2019-04-09 ENCOUNTER — DOCUMENTATION ONLY (OUTPATIENT)
Dept: TRANSPLANT | Facility: CLINIC | Age: 54
End: 2019-04-09

## 2019-04-09 NOTE — PROGRESS NOTES
Following submitted for HCC points    This is an initial application that does not meet criteria for automatic approval. This is a 54 year old with ESTRADA cirrhosis who had a MRI on 12/23/18 that showed a 3.1 5B and a 1.1 5A with an AFP of 5 on 12/28/18. The patient underwent TACE on 1/22/19 and MWA on 1/23/19. Follow up MRI on 2/22/19 shows no HCC and an AFP of 6 on 2/26/19. This patient has had a great response to treatment and is within Andrew criteria. We are requesting the patient be granted the initial HCC MELD exception

## 2019-04-09 NOTE — LETTER
April 9, 2019    Frandy Workman  [address] [address]    Dear Mr. Workman,    This letter is sent to confirm that you have completed your transplant work-up and you are a candidate in the liver transplant program at the [facility].  You were placed on the liver active waitlist on 4/9/19.      When you are active on the waitlist and an organ becomes available, a coordinator will need to speak to you immediately 24 hours a day, 7 days a week.  You could be contacted at any time during the day or night as an organ could become available at any time.  Please make certain our office always has your current telephone numbers and address.      Items we will need from you:      We have received approval from your insurance company for the transplant procedure.  It is critical that you notify us if there is any change in your insurance.  It is also important that you familiarize yourself with the details of your specific insurance policy.  Our patient  is available to assist you if you should have any questions regarding your coverage.      During this waiting period, we may request additional periodic laboratory tests with your primary physician.  It will be your responsibility to remind your physician to forward your results to the Transplant Office.      We need to be kept informed of any changes in your medical condition such as:    o changes in your medications,   o significant changes in your health  o significant infections (such as pneumonia or abscesses)  o blood transfusions  o any condition which requires hospitalization  o any surgery      Remember to complete any routine cancer screening tests required before your transplant.  This includes colonoscopy; prostate screening for men, and mammogram and gynecologic testing for women, as well as dental work.  Your primary care clinic can assist you with arranging for these exams.  Remind your  caregivers to forward copies of the records and final reports.    We want you to know that our program has physician and surgeon coverage 24 hours a day, 365 days a year. If this coverage changes or there are substantial program changes, you will be notified in writing by letter.     Attached is a letter from the [employer] ([employer]). It describes the services and information offered to patients by [employer] and the Organ Procurement and Transplantation Network.    We appreciate having had the opportunity to participate in your care.  If you have questions, please feel free to call the Transplant Office at [phone] or [phone].    Sincerely,    Gustavo Torres Jr., BSN, RN  Liver Transplant Coordinator  [phone]    Solid Organ Transplant  [facility]    Enclosures: ANJANA Letter  cc: Care Team

## 2019-04-10 ENCOUNTER — DOCUMENTATION ONLY (OUTPATIENT)
Dept: TRANSPLANT | Facility: CLINIC | Age: 54
End: 2019-04-10

## 2019-04-10 NOTE — PHARMACY-MEDICATION REGIMEN REVIEW
Pharmacy Liver Pre-Transplant Medication Evaluation    This patient is a 54 year old male being considered for liver transplantation.   As part of the liver pre-transplant patient evaluation, pharmacy has screened this patient s electronic medical record for medication related concerns.     Assessment/Plan:  The following medication related issues may be of possible concern for this patient post transplant, based on the medical record medication list review:     Spironolactone: K+ sparing. Increased risk of hyperkalemia in environment of tacrolimus immunosuppression. Consider monitoring or alternative diuretic if needed post transplant.     Pharmacy will continue to participate in this patient's care throughout the transplant course. Please contact pharmacy with any further medication related questions or concerns.    Juanpablo Clark R.Ph.  [facility]  [phone] pager

## 2019-04-11 PROBLEM — I81 PORTAL VEIN THROMBOSIS: Status: ACTIVE | Noted: 2019-04-11

## 2019-04-11 NOTE — PROGRESS NOTES
Spoke to patient about entertaining HBV and HCV offers.    He is wanting to be open to those offers and will also plan on discussing more at next Dr. Banuelos appt.     Patient now open for HBV and HCV offers.

## 2019-04-12 ENCOUNTER — ANCILLARY PROCEDURE (OUTPATIENT)
Dept: MRI IMAGING | Facility: CLINIC | Age: 54
End: 2019-04-12
Attending: RADIOLOGY
Payer: MEDICARE

## 2019-04-12 DIAGNOSIS — K74.60 LIVER CIRRHOSIS SECONDARY TO NASH (H): ICD-10-CM

## 2019-04-12 DIAGNOSIS — C22.0 HCC (HEPATOCELLULAR CARCINOMA) (H): ICD-10-CM

## 2019-04-12 DIAGNOSIS — K75.81 LIVER CIRRHOSIS SECONDARY TO NASH (H): ICD-10-CM

## 2019-04-12 LAB
ALBUMIN SERPL-MCNC: 3 G/DL (ref 3.4–5)
ALP SERPL-CCNC: 120 U/L (ref 40–150)
ALT SERPL W P-5'-P-CCNC: 34 U/L (ref 0–70)
ANION GAP SERPL CALCULATED.3IONS-SCNC: 7 MMOL/L (ref 3–14)
AST SERPL W P-5'-P-CCNC: 38 U/L (ref 0–45)
BILIRUB SERPL-MCNC: 1.3 MG/DL (ref 0.2–1.3)
BUN SERPL-MCNC: 28 MG/DL (ref 7–30)
CALCIUM SERPL-MCNC: 9.2 MG/DL (ref 8.5–10.1)
CHLORIDE SERPL-SCNC: 104 MMOL/L (ref 94–109)
CO2 SERPL-SCNC: 27 MMOL/L (ref 20–32)
CREAT SERPL-MCNC: 1.49 MG/DL (ref 0.66–1.25)
GFR SERPL CREATININE-BSD FRML MDRD: 52 ML/MIN/{1.73_M2}
GLUCOSE SERPL-MCNC: 148 MG/DL (ref 70–99)
POTASSIUM SERPL-SCNC: 4 MMOL/L (ref 3.4–5.3)
PROT SERPL-MCNC: 7.4 G/DL (ref 6.8–8.8)
SODIUM SERPL-SCNC: 138 MMOL/L (ref 133–144)

## 2019-04-12 RX ORDER — GADOBUTROL 604.72 MG/ML
7.5 INJECTION INTRAVENOUS ONCE
Status: COMPLETED | OUTPATIENT
Start: 2019-04-12 | End: 2019-04-12

## 2019-04-12 RX ADMIN — GADOBUTROL 7.5 ML: 604.72 INJECTION INTRAVENOUS at 07:42

## 2019-04-15 ENCOUNTER — OFFICE VISIT (OUTPATIENT)
Dept: VASCULAR SURGERY | Facility: CLINIC | Age: 54
End: 2019-04-15
Payer: MEDICARE

## 2019-04-15 VITALS — DIASTOLIC BLOOD PRESSURE: 53 MMHG | HEART RATE: 64 BPM | SYSTOLIC BLOOD PRESSURE: 102 MMHG

## 2019-04-15 DIAGNOSIS — C22.0 HEPATOCELLULAR CARCINOMA (H): Primary | ICD-10-CM

## 2019-04-15 ASSESSMENT — PAIN SCALES - GENERAL: PAINLEVEL: NO PAIN (0)

## 2019-04-15 NOTE — LETTER
4/15/2019       RE: Frandy Workman  [address] [facility] [place] [facility] [address]     Dear Colleague,    Thank you for referring your patient, Frandy Workman, to the [facility] at [facility]. Please see a copy of my visit note below.    S: Mr. Workman is a pleasant 55 yo with ESTRADA induced HCC.  He returns after TACE (1/22) and ablation (1/23) of a large segment 4A lesion and ablation (1/23) of a segment 3 lesion.  He had significant pain afterwards which required admission.   He has no current complaints, other than long standing generalized fatigue.  He was activated on the transplant list last week.    O:  /53   Pulse 64      Lab Results   Component Value Date    WBC 4.2 04/01/2019     Lab Results   Component Value Date    RBC 3.91 04/01/2019     Lab Results   Component Value Date    HGB 13.7 04/01/2019     Lab Results   Component Value Date    HCT 42.4 04/01/2019     Lab Results   Component Value Date     04/01/2019     Lab Results   Component Value Date    MCH 35.0 04/01/2019     Lab Results   Component Value Date    MCHC 32.3 04/01/2019     Lab Results   Component Value Date    RDW 14.5 04/01/2019     Lab Results   Component Value Date    PLT 41 04/01/2019     Last Comprehensive Metabolic Panel:  Sodium   Date Value Ref Range Status   04/12/2019 138 133 - 144 mmol/L Final     Potassium   Date Value Ref Range Status   04/12/2019 4.0 3.4 - 5.3 mmol/L Final     Chloride   Date Value Ref Range Status   04/12/2019 104 94 - 109 mmol/L Final     Carbon Dioxide   Date Value Ref Range Status   04/12/2019 27 20 - 32 mmol/L Final     Anion Gap   Date Value Ref Range Status   04/12/2019 7 3 - 14 mmol/L Final     Glucose   Date Value Ref Range Status   04/12/2019 148 (H) 70 - 99 mg/dL Final     Urea Nitrogen   Date Value Ref Range Status   04/12/2019 28 7 - 30 mg/dL Final     Creatinine   Date Value Ref Range Status   04/12/2019 1.49 (H) 0.66 - 1.25 mg/dL Final      GFR Estimate   Date Value Ref Range Status   04/12/2019 52 (L) >60 mL/min/[1.73_m2] Final     Comment:     Non  GFR Calc  Starting 12/18/2018, serum creatinine based estimated GFR (eGFR) will be   calculated using the Chronic Kidney Disease Epidemiology Collaboration   (CKD-EPI) equation.       Calcium   Date Value Ref Range Status   04/12/2019 9.2 8.5 - 10.1 mg/dL Final     Bilirubin Total   Date Value Ref Range Status   04/12/2019 1.3 0.2 - 1.3 mg/dL Final     Alkaline Phosphatase   Date Value Ref Range Status   04/12/2019 120 40 - 150 U/L Final     ALT   Date Value Ref Range Status   04/12/2019 34 0 - 70 U/L Final     AST   Date Value Ref Range Status   04/12/2019 38 0 - 45 U/L Final     MRI: I reviewed the MRI from 4/12/2019 which demonstrated no evidence of residual or recurrent disease.    A/P:  Mr. Workman is doing well following TACE (1/22) and ablation (1/23) of a large segment 4A lesion and ablation (1/23) of a segment 3 lesion.  He is doing well without evidence of residual or recurrent disease.  At this time we discussed he does not need to come back to see me, he will continue to follow with Dr. Banuelos and I will follow his MRI results.  If a new lesion pops up prior to transplant we will see him back to discuss his options.    A total of 15 minutes was spent in care for the patient, of which >50% was spent in counseling and co-ordination of care.      Again, thank you for allowing me to participate in the care of your patient.      Sincerely,    Benigno Scott MD

## 2019-04-15 NOTE — PROGRESS NOTES
S: Mr. 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  He returns after TACE (1/22) and ablation (1/23) of a large segment 4A lesion and ablation (1/23) of a segment 3 lesion.  He had significant pain afterwards which required admission.  He has no current complaints, other than long standing generalized fatigue.  He was activated on the transplant list last week.    O:  /53   Pulse 64      Lab Results   Component Value Date    WBC 4.2 04/01/2019     Lab Results   Component Value Date    RBC 3.91 04/01/2019     Lab Results   Component Value Date    HGB 13.7 04/01/2019     Lab Results   Component Value Date    HCT 42.4 04/01/2019     Lab Results   Component Value Date     04/01/2019     Lab Results   Component Value Date    MCH 35.0 04/01/2019     Lab Results   Component Value Date    MCHC 32.3 04/01/2019     Lab Results   Component Value Date    RDW 14.5 04/01/2019     Lab Results   Component Value Date    PLT 41 04/01/2019     Last Comprehensive Metabolic Panel:  Sodium   Date Value Ref Range Status   04/12/2019 138 133 - 144 mmol/L Final     Potassium   Date Value Ref Range Status   04/12/2019 4.0 3.4 - 5.3 mmol/L Final     Chloride   Date Value Ref Range Status   04/12/2019 104 94 - 109 mmol/L Final     Carbon Dioxide   Date Value Ref Range Status   04/12/2019 27 20 - 32 mmol/L Final     Anion Gap   Date Value Ref Range Status   04/12/2019 7 3 - 14 mmol/L Final     Glucose   Date Value Ref Range Status   04/12/2019 148 (H) 70 - 99 mg/dL Final     Urea Nitrogen   Date Value Ref Range Status   04/12/2019 28 7 - 30 mg/dL Final     Creatinine   Date Value Ref Range Status   04/12/2019 1.49 (H) 0.66 - 1.25 mg/dL Final     GFR Estimate   Date Value Ref Range Status   04/12/2019 52 (L) >60 mL/min/[1.73_m2] Final     Comment:     Non  GFR Calc  Starting 12/18/2018, serum creatinine based estimated GFR (eGFR) will be   calculated using the Chronic Kidney Disease Epidemiology Collaboration    (CKD-EPI) equation.       Calcium   Date Value Ref Range Status   04/12/2019 9.2 8.5 - 10.1 mg/dL Final     Bilirubin Total   Date Value Ref Range Status   04/12/2019 1.3 0.2 - 1.3 mg/dL Final     Alkaline Phosphatase   Date Value Ref Range Status   04/12/2019 120 40 - 150 U/L Final     ALT   Date Value Ref Range Status   04/12/2019 34 0 - 70 U/L Final     AST   Date Value Ref Range Status   04/12/2019 38 0 - 45 U/L Final     MRI: I reviewed the MRI from 4/12/2019 which demonstrated no evidence of residual or recurrent disease.    A/P:  Mr. Workman is doing well following TACE (1/22) and ablation (1/23) of a large segment 4A lesion and ablation (1/23) of a segment 3 lesion.  He is doing well without evidence of residual or recurrent disease.  At this time we discussed he does not need to come back to see me, he will continue to follow with Dr. Banuelos and I will follow his MRI results.  If a new lesion pops up prior to transplant we will see him back to discuss his options.    A total of 15 minutes was spent in care for the patient, of which >50% was spent in counseling and co-ordination of care.

## 2019-04-15 NOTE — NURSING NOTE
Chief Complaint   Patient presents with     RECHECK     3 month follow up s/p tace/ablate     IRMA Hendricks

## 2019-04-16 ENCOUNTER — TELEPHONE (OUTPATIENT)
Dept: GASTROENTEROLOGY | Facility: CLINIC | Age: 54
End: 2019-04-16

## 2019-04-16 DIAGNOSIS — K75.81 LIVER CIRRHOSIS SECONDARY TO NASH (H): Primary | ICD-10-CM

## 2019-04-16 DIAGNOSIS — K74.60 LIVER CIRRHOSIS SECONDARY TO NASH (H): Primary | ICD-10-CM

## 2019-04-16 NOTE — TELEPHONE ENCOUNTER
Called patient.  Let him know, Dr. Leventhal, who is covering for Dr. Banuelos, reviewed his labs.  His creatinine is elevated.  So he would like for him to stop the lasix, CONTINUE the spironnolactone, drink plenty of water, and repeat BMP in 2 weeks.     Patient expressed understanding and had no further questions or concerns.

## 2019-04-17 ENCOUNTER — OFFICE VISIT (OUTPATIENT)
Dept: PHARMACY | Facility: CLINIC | Age: 54
End: 2019-04-17
Payer: COMMERCIAL

## 2019-04-17 DIAGNOSIS — E11.8 TYPE 2 DIABETES MELLITUS WITH COMPLICATION, UNSPECIFIED WHETHER LONG TERM INSULIN USE: Primary | ICD-10-CM

## 2019-04-17 DIAGNOSIS — E78.5 HYPERLIPIDEMIA LDL GOAL <100: ICD-10-CM

## 2019-04-17 PROCEDURE — 99607 MTMS BY PHARM ADDL 15 MIN: CPT | Performed by: PHARMACIST

## 2019-04-17 PROCEDURE — 99606 MTMS BY PHARM EST 15 MIN: CPT | Performed by: PHARMACIST

## 2019-04-17 NOTE — Clinical Note
FYI- patient experiencing nightly headaches and itching on his legs since switching from Pravastatin to Crestor. Per patient he was instructed that he could switch back to Pravastatin if he has any issues. Before he does that, I am having him cut his Crestor dose in half and assess symptoms in 2 weeks. He may then switch back. EAGLEI.

## 2019-04-17 NOTE — PATIENT INSTRUCTIONS
Recommendations from today's MTM visit:                                                    MTM (medication therapy management) is a service provided by a clinical pharmacist designed to help you get the most of out of your medicines.     1. Stop on your way out and schedule a lab appointment.     2. Cut your Crestor in half, if your symptoms do not improve in 2 weeks, go back to your Pravastatin.     3. Decrease your Tresiba to 72 units daily. Every week if the majority of your blood sugars are less than 100, decrease by another 3 units. Your blood sugar goal is .     Next MTM visit: 5/23/19 at 2:00pm.    To schedule another MTM appointment, please call the clinic directly or you may call the MTM scheduling line at [phone] or toll-free at [phone].     My Clinical Pharmacist's contact information:                                                      It was a pleasure talking with you today!  Please feel free to contact me with any questions or concerns you have.      Donald Bradley, Aubrey  MTM Pharmacist    Phone: [phone]     You may receive a survey about the MTM services you received by email and/or US Mail.  I would appreciate your feedback to help me serve you better in the future. Your comments will be anonymous.

## 2019-04-17 NOTE — PROGRESS NOTES
SUBJECTIVE/OBJECTIVE:                Frandy Workman is a 54 year old male coming in for a follow up MTM appt to discuss diabetes.     Chief Complaint: Complains of headaches, leg itching since starting Crestor 20mg.     Allergies/ADRs: Reviewed in Epic  Tobacco: History of tobacco dependence - quit 2017   Alcohol: not currently using  Caffeine: 1-3 cups/day of coffee  Activity: none, pt is disabled due to liver disease. Pt is working towards getting on the transplant list.     Medication Adherence/Access:  Patient uses pill box(es).  Per patient, misses medication 0 times per week. Rarely misses doses.     Hyperlipidemia: Current therapy includes Crestor 20mg daily (changed from Pravastatin last visit by Dr. Banuelos).  Pt reports his legs itching and headaches over night since switching to Crestor from Pravastatin.   The 10-year ASCVD risk score (Gayathriedison HAWKINS Jr., et al., 2013) is: 9.1%    Values used to calculate the score:      Age: 54 years      Sex: Male      Is Non- : No      Diabetic: Yes      Tobacco smoker: No      Systolic Blood Pressure: 102 mmHg      Is BP treated: Yes      HDL Cholesterol: 26 mg/dL      Total Cholesterol: 131 mg/dL    Diabetes:  Pt currently taking Farxiga 10mg daily, Metformin ER 500mg daily, Tresiba 90 units daily, , Novolog sliding scale . Pt's diarrhea has resolved.   SMBG: two times daily. Ranges (per BGM):  Date FBG/ 2hours post Lunch/2hours post Dinner /2hours post   4/17 82     4/16 121  136   4/15 145 (snacked at nigh)  77   4/14 92 130    4/13 80 140 67, 89   4/12 168, 117     4/11 69  161   4/10 108  86   4/9 75  105   4/8 96  109   4/7 86  101   Patient is experiencing hypoglycemia. Frequency of hypoglycemia? 2 times this week. Symptoms of low blood sugar? Sweaty, lightheadedness.  Recent symptoms of high blood sugar? none   Aspirin: Taking 81mg daily and denies side effects  Lab Results   Component Value Date    A1C 6.6 08/08/2018    A1C 6.5 06/09/2017     A1C 7.8 10/25/2016     Today's Vitals: There were no vitals taken for this visit.    ASSESSMENT:                 Current medications were reviewed today.      Medication Adherence: good, no issues identified    Hyperlipidemia: Pt complains of new onset headaches and pruritis of lower extremities since starting Crestor. He had no such issues on Pravastatin. I will have patient reduce Crestor dose to 10mg, and assess symptoms. If they still occur, patient may go back to taking Pravastatin as directed by Dr. Banuelos. Crestor 10mg is still more potent than Pravastatin 20mg daily and would be preferred if tolerated.     Diabetes:  Needs improvement. FBG still running below 80 occasionally and below 100 the majority of the time. Will drop Tresiba another 10% as okayed by Dr. Wilcox last visit. Patient will taper by 3 units every week if majority of BG remains <100 as well. Pt's diarrhea has resolved and we could consider increasing Metformin to 1000mg, will discuss with Dr. Wilcox.     PLAN:                Dr. Jeri DAVILA...  1. Fasting blood sugars ranging , majority are <100, had him drop Tresiba by another 10%. He will continue to taper by 3 units every week if the majority of his fasting BG are <100. I will see him in 1 month.  2. Patient is tolerating Metformin ER 500mg daily. Recommend increasing to 1000mg daily.    Dr. Vin DAVILA...  1. Pt has been experiencing nightly headaches and itching on his legs. I am having him decrease Crestor to 10mg to see if these symptoms resolve.     Pt to...  1. Reduce Tresiba to 72 units daily, decrease by another 3 units every week if fasting BG is <100.     I spent 30 minutes with this patient today. I offer these suggestions for consideration by PCP/ Hepatology/ Endocrine. A copy of the visit note was provided to the patient's primary care provider.    Will follow up 4 weeks.    The patient was given a summary of these recommendations as an after visit summary.    Donald  Kirk, PharmD  [facility] Pharmacist    Phone: [phone]

## 2019-04-18 ENCOUNTER — DOCUMENTATION ONLY (OUTPATIENT)
Dept: TRANSPLANT | Facility: CLINIC | Age: 54
End: 2019-04-18

## 2019-04-18 NOTE — PROGRESS NOTES
Frandy Workman discussed at tumor conference on 4/12/19.    4/12/19 Imaging:  Stable changes  No HCC noted  Stable PVT      Recommendations:  Follow up in 3 months     Trish Church RN, BSN  Liver transplant coordinator  [phone] Office

## 2019-04-19 DIAGNOSIS — E11.69 TYPE 2 DIABETES MELLITUS WITH DIABETIC FOOT DEFORMITY (H): Primary | ICD-10-CM

## 2019-04-19 DIAGNOSIS — M21.969 TYPE 2 DIABETES MELLITUS WITH DIABETIC FOOT DEFORMITY (H): Primary | ICD-10-CM

## 2019-04-21 NOTE — TELEPHONE ENCOUNTER
alpha-lipoic acid 600 MG capsule  Last Written Prescription Date:  unknown  Last Fill Quantity: unknown,   # refills: unknown  Last Office Visit : 2/11/19  Future Office visit:  8/14/19    Routing refill request to provider for review/approval because:  historical. Not on protocol.

## 2019-04-22 ENCOUNTER — ONCOLOGY VISIT (OUTPATIENT)
Dept: ONCOLOGY | Facility: CLINIC | Age: 54
End: 2019-04-22
Attending: INTERNAL MEDICINE
Payer: MEDICARE

## 2019-04-22 VITALS
HEIGHT: 69 IN | RESPIRATION RATE: 18 BRPM | DIASTOLIC BLOOD PRESSURE: 49 MMHG | WEIGHT: 179 LBS | OXYGEN SATURATION: 93 % | HEART RATE: 67 BPM | SYSTOLIC BLOOD PRESSURE: 91 MMHG | TEMPERATURE: 98.3 F | BODY MASS INDEX: 26.51 KG/M2

## 2019-04-22 DIAGNOSIS — C22.0 HCC (HEPATOCELLULAR CARCINOMA) (H): Primary | ICD-10-CM

## 2019-04-22 PROCEDURE — G0463 HOSPITAL OUTPT CLINIC VISIT: HCPCS | Mod: ZF

## 2019-04-22 PROCEDURE — 99214 OFFICE O/P EST MOD 30 MIN: CPT | Mod: ZP | Performed by: INTERNAL MEDICINE

## 2019-04-22 RX ORDER — PERPHENAZINE 16 MG
600 TABLET ORAL DAILY
Qty: 90 CAPSULE | Refills: 3 | Status: ON HOLD | OUTPATIENT
Start: 2019-04-22 | End: 2019-12-17

## 2019-04-22 ASSESSMENT — PAIN SCALES - GENERAL: PAINLEVEL: MODERATE PAIN (5)

## 2019-04-22 ASSESSMENT — MIFFLIN-ST. JEOR: SCORE: 1637.57

## 2019-04-22 NOTE — LETTER
4/22/2019       RE: Frandy Workman  [address]  [facility] [address]     Dear Colleague,    Thank you for referring your patient, Frandy Workman, to the [facility]. Please see a copy of my visit note below.    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   He used to live in [place], but moved to [place] to be closer to his daughter, although he seems to be estranged from her now, as well as the rest of his family.  He has cirrhosis felt secondary to ESTRADA, and says that he was on the transplant list at [place] when he lived in [place].  He follows with Dr. Banuelos here in the hepatology clinic now.  He underwent routine screening ultrasound on 12/10/18, which showed a non-specific left hepatic lobe lesion.  MRI abdomen on 12/23/19 showed 2 lesions, measuring 3.1 cm and 1.1 cm in hepatic segments 4b and 3, respectively, that were LIRADS 5.  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      He underwent TACE on 1/22/19 to segment IV lesion and CT-guided microwave ablation on 1/23/19 to segment III lesion.    He is on liver transplant list.      He does not smoke, but used to chew tobacco.  He had a parotid gland mass removed previously that was benign.  He does not drink alcohol.    He notes that he has no family or friends here.  He is estranged from his family and daughter.  He notes that his neighbor can give him rides to appointments.          INTERIM HISTORY: Miller is here for follow-up today.  He says that he is doing well. He says that his IR procedures went well.  He is on the liver transplant list.        REVIEW OF SYSTEMS:   14 point ROS was reviewed and is negative other  than as noted above in HPI.       HOME MEDICATIONS:  Current Outpatient Medications   Medication Sig Dispense Refill     ACCU-CHEK EDINSON PLUS test strip USE TO TEST BLOOD SUGARS FOUR TIMES DAILY OR AS DIRECTED 150 strip 11     alpha-lipoic acid 600 MG capsule Take 600 mg by mouth daily       Artificial Tear Solution (SM ARTIFICIAL TEARS) SOLN Place 1 drop into the right eye every hour as needed Apply at least 4 times daily and as needed for dry eye 1 Bottle 3     aspirin (ASPIRIN LOW DOSE) 81 MG EC tablet Take 1 tablet (81 mg) by mouth daily 90 tablet 2     BD VIKTORIA U/F 32G X 4 MM insulin pen needle Use 5 per day 300 each 3     blood glucose monitoring (ACCU-CHEK EDINSON PLUS) test strip USE TO TEST BLOOD SUGARS FOUR TIMES DAILY OR AS DIRECTED 400 strip 3     blood glucose monitoring (ACCU-CHEK EDINSON PLUS) test strip USE TO TEST BLOOD SUGARS FOUR TIMES DAILY OR AS DIRECTED 300 strip 3     blood glucose monitoring (ACCU-CHEK EDINSON PLUS) test strip Use to test blood sugar 4 times daily 400 each 3     blood glucose monitoring (ACCU-CHEK FASTCLIX) lancets Use to test blood sugar 4 times daily or as directed.  1 box = 102 lancets 408 each 3     carvedilol (COREG) 12.5 MG tablet TAKE 1 TABLET(12.5 MG) BY MOUTH TWICE DAILY WITH MEALS 180 tablet 3     Cholecalciferol (VITAMIN D-3 PO) Take 2,000 Units by mouth every morning        cyanocobalamin (RA VITAMIN B-12 TR) 1000 MCG TBCR Take 5,000 mcg by mouth every morning        dapagliflozin (FARXIGA) 10 MG TABS tablet Take 1 tablet (10 mg) by mouth daily 90 tablet 3     diclofenac (VOLTAREN) 1 % topical gel Place 2 g onto the skin 4 times daily 100 g 3     econazole nitrate 1 % external cream APPLY TOPICALLY DAILY TO FEET AND TOENAILS 85 g 0     ferrous sulfate (FEROSUL) 325 (65 Fe) MG tablet Take 325 mg by mouth 3 times daily (with meals)        insulin degludec (TRESIBA) 200 UNIT/ML pen Take 100 units daily. (Patient taking differently: Take 90 units daily.) 100 mL 3      lactulose (CHRONULAC) 10 GM/15ML solution Take 45 mLs (30 g) by mouth 4 times daily 5000 mL 11     metFORMIN (GLUCOPHAGE-XR) 500 MG 24 hr tablet Take 1 tablet (500 mg) by mouth daily (with breakfast) 90 tablet 1     Multiple Vitamin (THERAVITE PO) Take 1 tablet by mouth every morning        NOVOLOG FLEXPEN 100 UNIT/ML soln INJECT 1 UNIT PER 4 GRAMS OF CARBS AT MEALS AND SNACKS. CORRECTION SCALE OF 1 UNITS PER 25 OVER [address]. AVE DOSE 75 UNITERS PER DAY 30 mL 3     omeprazole (PRILOSEC) 40 MG DR capsule Take 1 capsule (40 mg) by mouth daily 90 capsule 0     potassium chloride ER (K-DUR/KLOR-CON M) 20 MEQ CR tablet Take 1 tablet (20 mEq) by mouth daily 90 tablet 3     rifaximin (XIFAXAN) 550 MG TABS tablet TAKE 1 TABLET(550 MG) BY MOUTH TWICE DAILY 60 tablet 3     rosuvastatin (CRESTOR) 20 MG tablet Take 1 tablet (20 mg) by mouth daily 60 tablet 2     spironolactone (ALDACTONE) 50 MG tablet Take 1 tablet (50 mg) by mouth daily 90 tablet 0     tamsulosin (FLOMAX) 0.4 MG capsule Take 1 capsule (0.4 mg) by mouth daily 90 capsule 3     traZODone (DESYREL) 50 MG tablet Take 1 tablet (50 mg) by mouth At Bedtime 90 tablet 0     furosemide (LASIX) 20 MG tablet Take 1 tablet (20 mg) by mouth daily (Patient not taking: Reported on 4/22/2019) 90 tablet 0         ALLERGIES:  Allergies   Allergen Reactions     Codeine Other (See Comments)     Cannot take due to liver  Cannot tolerate oral narcotics         PAST MEDICAL HISTORY:  Past Medical History:   Diagnosis Date     Anemia 2013    Low blood plates current is 37     Arthritis      BPH (benign prostatic hyperplasia)      CAD (coronary artery disease) 4/1/2019     Cholelithiasis      Conductive hearing loss 8/16/2017    Have a lump on my right side of my face.  Had wax discharge     Depressive disorder 1986    Suffer effects throughout life     Gastroesophageal reflux disease 12/1/2014    Being treated with Prilosac     HCC (hepatocellular carcinoma) (H) 1/22/2019     Hepatitis  2014    Diagnosed with schrosis ESTRADA in 2014.  Suffer from hepatatie     HTN (hypertension)      Hyperlipidemia      Liver cirrhosis secondary to ESTRADA (H)      Portal vein thrombosis 4/11/2019     Type II diabetes mellitus (H)     Insulin adminstered BID daily.          PAST SURGICAL HISTORY:  Past Surgical History:   Procedure Laterality Date     COLONOSCOPY      2015     CV HEART CATHETERIZATION WITH POSSIBLE INTERVENTION N/A 2/26/2019    Procedure: CORS;  Surgeon: Jagdish Hoyt MD;  Location:  HEART CARDIAC CATH LAB     ESOPHAGOSCOPY, GASTROSCOPY, DUODENOSCOPY (EGD), COMBINED N/A 11/17/2016    Procedure: COMBINED ESOPHAGOSCOPY, GASTROSCOPY, DUODENOSCOPY (EGD);  Surgeon: Santi Rosas MD;  Location:  GI     ESOPHAGOSCOPY, GASTROSCOPY, DUODENOSCOPY (EGD), COMBINED N/A 11/17/2017    Procedure: COMBINED ESOPHAGOSCOPY, GASTROSCOPY, DUODENOSCOPY (EGD);  EGD;  Surgeon: Santi Rosas MD;  Location:  GI     ESOPHAGOSCOPY, GASTROSCOPY, DUODENOSCOPY (EGD), COMBINED N/A 12/28/2018    Procedure: EGD;  Surgeon: Santi Rosas MD;  Location:  OR     HEAD & NECK SURGERY      12/2017 at [facility].      IMPLANT GOLD WEIGHT EYELID Right 11/16/2017    Procedure: IMPLANT WEIGHT EYELID;  Right Upper Eyelid Weight, right tarsal strip lower eyelid;  Surgeon: Milana Malave MD;  Location:  OR     IR CHEMO EMBOLIZATION  1/22/2019     KNEE SURGERY Left      ORTHOPEDIC SURGERY       PAROTIDECTOMY, RADICAL NECK DISSECTION Right 11/2/2017    Procedure: PAROTIDECTOMY, RADICAL NECK DISSECTION;  Right Superfacial Parotidectomy , Facial nerve repair. with [facility] facial nerve monitor.;  Surgeon: Asiya Morgan MD;  Location: [facility] OR     PICC INSERTION Left 11/06/2017    4fr SL BioFlo PICC, 44cm in the L basilic vein w/ tip in the low SVC     VASCULAR SURGERY           SOCIAL HISTORY:  Social History     Socioeconomic History     Marital status:      Spouse name: Not on file     Number of  children: Not on file     Years of education: Not on file     Highest education level: Not on file   Occupational History     Not on file   Social Needs     Financial resource strain: Not on file     Food insecurity:     Worry: Not on file     Inability: Not on file     Transportation needs:     Medical: Not on file     Non-medical: Not on file   Tobacco Use     Smoking status: Never Smoker     Smokeless tobacco: Former User     Types: Chew     Tobacco comment: 1 tin per week   Substance and Sexual Activity     Alcohol use: No     Alcohol/week: 0.0 oz     Comment: quit Sept. 1996     Drug use: No     Sexual activity: Not Currently     Partners: Female     Birth control/protection: Condom   Lifestyle     Physical activity:     Days per week: Not on file     Minutes per session: Not on file     Stress: Not on file   Relationships     Social connections:     Talks on phone: Not on file     Gets together: Not on file     Attends Zoroastrianism service: Not on file     Active member of club or organization: Not on file     Attends meetings of clubs or organizations: Not on file     Relationship status: Not on file     Intimate partner violence:     Fear of current or ex partner: Not on file     Emotionally abused: Not on file     Physically abused: Not on file     Forced sexual activity: Not on file   Other Topics Concern     Parent/sibling w/ CABG, MI or angioplasty before 65F 55M? Yes   Social History Narrative     Not on file         FAMILY HISTORY:  Family History   Problem Relation Age of Onset     Prostate Cancer Maternal Grandfather      Substance Abuse Maternal Grandfather         Alcohol     Colon Cancer Father 60     Pancreatic Cancer Father 60     Prostate Cancer Father      Colorectal Cancer Father      Macular Degeneration Father      Cancer Father      Glaucoma Father      Skin Cancer Father      Colorectal Cancer Maternal Grandmother      Cancer Maternal Grandmother      Substance Abuse Maternal Grandmother      "    Alcohol     Colorectal Cancer Paternal Grandmother      Cancer Mother      Diabetes Mother          3/2016     Cerebrovascular Disease Mother         Passed away in Feb of this year, 80 years old.     Thyroid Disease Mother      Depression Mother      Asthma Sister         Had since birth     Thyroid Disease Sister      Depression Sister      Liver Disease No family hx of      Melanoma No family hx of          PHYSICAL EXAM:  Vital signs:  BP 91/49 (BP Location: Left arm, Patient Position: Sitting, Cuff Size: Adult Regular)   Pulse 67   Temp 98.3  F (36.8  C) (Oral)   Resp 18   Ht 1.753 m (5' 9.02\")   Wt 81.2 kg (179 lb)   SpO2 93%   BMI 26.42 kg/m      ECO  GENERAL/CONSTITUTIONAL: No acute distress.  EYES: No scleral icterus.  RESPIRATORY: Clear to auscultation bilaterally. No crackles or wheezing.   CARDIOVASCULAR: Regular rate and rhythm without murmurs, gallops, or rubs.  GASTROINTESTINAL: No tenderness. The patient has normal bowel sounds. No guarding.    MUSCULOSKELETAL: Warm and well-perfused.  NEUROLOGIC: Alert, oriented, answers questions appropriately.  INTEGUMENTARY: No jaundice.      LABS:  CBC RESULTS:   Recent Labs   Lab Test 19  1154   WBC 4.2   RBC 3.91*   HGB 13.7   HCT 42.4   *   MCH 35.0*   MCHC 32.3   RDW 14.5   PLT 41*     Recent Labs   Lab Test 19  0711 19  1154    139   POTASSIUM 4.0 4.3   CHLORIDE 104 109   CO2 27 24   ANIONGAP 7 6   * 148*   BUN 28 19   CR 1.49* 1.12   DEBORAH 9.2 9.0     Lab Results   Component Value Date    AST 38 2019     Lab Results   Component Value Date    ALT 34 2019     No results found for: BILICONJ   Lab Results   Component Value Date    BILITOTAL 1.3 2019     Lab Results   Component Value Date    ALBUMIN 3.0 2019     Lab Results   Component Value Date    PROTTOTAL 7.4 2019      Lab Results   Component Value Date    ALKPHOS 120 2019     Component      Latest Ref Rng & Units " 12/28/2018 2/4/2019 2/26/2019   Alpha Fetoprotein      0 - 8 ug/L 5.2 4.2 5.5           IMAGING:  MRI abdomen 4/12/19:  1. Redemonstrated treatment and changes in the left lobe of the liver  without evidence of recurrence. LIRADS TR nonviable. No new lesion.   2. Cirrhotic morphology of the liver and evidence of portal  hypertension, including splenomegaly and large paraesophageal varices  and splenorenal shunts.  3. Resolution of previously seen ascites.  4. New small right pleural effusion.  5. No significant change in nonocclusive bland appearing SMV  thrombosis.  6. Based on this exam alone, the patient is within [place] criteria.      ASSESSMENT/PLAN:  Frandy Workman is a 53 year old male with:    1) Hepatocellular carcinoma: Child-Clark A (6).  Patient has a 3.1 cm lesion in segment 4b and 1.1 cm lesion in segment 3.  His CT chest and bone scan were negative for metastatic disease.    He underwent TACE on 1/22/19 to segment IV lesion and CT-guided microwave ablation on 1/23/19 to segment III lesion.    MRI abdomen on 4/12/19 showed treatment changes without evidence of recurrence.  No new lesions.    He is on liver transplant list.  He no longer follows with IR  -MRI and labs/AFP in 3 months  -RTC in 3 months    2) Cirrhosis: secondary to ESTRADA  -follows with hepatology - Dr. Banuelos    3) Thrombocytopenia: This has been a chronic issue and likely secondary to his history of liver disease.  Platelet count at baseline.    4) Diabetes:   -follows with endocrinology      I spent a total of 25 minutes with the patient, with over >50% of the time in counseling and/or coordination of care.       Amanda Lees MD  Hematology/Oncology  [facility]

## 2019-04-22 NOTE — PROGRESS NOTES
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   He used to live in [place], but moved to [place] to be closer to his daughter, although he seems to be estranged from her now, as well as the rest of his family.  He has cirrhosis felt secondary to ESTRADA, and says that he was on the transplant list at [place] when he lived in [place].  He follows with Dr. Banuelos here in the hepatology clinic now.  He underwent routine screening ultrasound on 12/10/18, which showed a non-specific left hepatic lobe lesion.  MRI abdomen on 12/23/19 showed 2 lesions, measuring 3.1 cm and 1.1 cm in hepatic segments 4b and 3, respectively, that were LIRADS 5.  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      He underwent TACE on 1/22/19 to segment IV lesion and CT-guided microwave ablation on 1/23/19 to segment III lesion.    He is on liver transplant list.      He does not smoke, but used to chew tobacco.  He had a parotid gland mass removed previously that was benign.  He does not drink alcohol.    He notes that he has no family or friends here.  He is estranged from his family and daughter.  He notes that his neighbor can give him rides to appointments.          INTERIM HISTORY: Miller is here for follow-up today.  He says that he is doing well. He says that his IR procedures went well.  He is on the liver transplant list.        REVIEW OF SYSTEMS:   14 point ROS was reviewed and is negative other than as noted above in HPI.       HOME MEDICATIONS:  Current Outpatient Medications   Medication Sig Dispense Refill     ACCU-CHEK EDINSON PLUS test strip USE TO TEST BLOOD SUGARS FOUR TIMES DAILY OR AS DIRECTED 150 strip 11      alpha-lipoic acid 600 MG capsule Take 600 mg by mouth daily       Artificial Tear Solution (SM ARTIFICIAL TEARS) SOLN Place 1 drop into the right eye every hour as needed Apply at least 4 times daily and as needed for dry eye 1 Bottle 3     aspirin (ASPIRIN LOW DOSE) 81 MG EC tablet Take 1 tablet (81 mg) by mouth daily 90 tablet 2     BD VIKTORIA U/F 32G X 4 MM insulin pen needle Use 5 per day 300 each 3     blood glucose monitoring (ACCU-CHEK EDINSON PLUS) test strip USE TO TEST BLOOD SUGARS FOUR TIMES DAILY OR AS DIRECTED 400 strip 3     blood glucose monitoring (ACCU-CHEK EDINSON PLUS) test strip USE TO TEST BLOOD SUGARS FOUR TIMES DAILY OR AS DIRECTED 300 strip 3     blood glucose monitoring (ACCU-CHEK EDINSON PLUS) test strip Use to test blood sugar 4 times daily 400 each 3     blood glucose monitoring (ACCU-CHEK FASTCLIX) lancets Use to test blood sugar 4 times daily or as directed.  1 box = 102 lancets 408 each 3     carvedilol (COREG) 12.5 MG tablet TAKE 1 TABLET(12.5 MG) BY MOUTH TWICE DAILY WITH MEALS 180 tablet 3     Cholecalciferol (VITAMIN D-3 PO) Take 2,000 Units by mouth every morning        cyanocobalamin (RA VITAMIN B-12 TR) 1000 MCG TBCR Take 5,000 mcg by mouth every morning        dapagliflozin (FARXIGA) 10 MG TABS tablet Take 1 tablet (10 mg) by mouth daily 90 tablet 3     diclofenac (VOLTAREN) 1 % topical gel Place 2 g onto the skin 4 times daily 100 g 3     econazole nitrate 1 % external cream APPLY TOPICALLY DAILY TO FEET AND TOENAILS 85 g 0     ferrous sulfate (FEROSUL) 325 (65 Fe) MG tablet Take 325 mg by mouth 3 times daily (with meals)        insulin degludec (TRESIBA) 200 UNIT/ML pen Take 100 units daily. (Patient taking differently: Take 90 units daily.) 100 mL 3     lactulose (CHRONULAC) 10 GM/15ML solution Take 45 mLs (30 g) by mouth 4 times daily 5000 mL 11     metFORMIN (GLUCOPHAGE-XR) 500 MG 24 hr tablet Take 1 tablet (500 mg) by mouth daily (with breakfast) 90 tablet 1     Multiple Vitamin  (THERAVITE PO) Take 1 tablet by mouth every morning        NOVOLOG FLEXPEN 100 UNIT/ML soln INJECT 1 UNIT PER 4 GRAMS OF CARBS AT MEALS AND SNACKS. CORRECTION SCALE OF 1 UNITS PER 25 OVER [address] DOSE 75 UNITERS PER DAY 30 mL 3     omeprazole (PRILOSEC) 40 MG DR capsule Take 1 capsule (40 mg) by mouth daily 90 capsule 0     potassium chloride ER (K-DUR/KLOR-CON M) 20 MEQ CR tablet Take 1 tablet (20 mEq) by mouth daily 90 tablet 3     rifaximin (XIFAXAN) 550 MG TABS tablet TAKE 1 TABLET(550 MG) BY MOUTH TWICE DAILY 60 tablet 3     rosuvastatin (CRESTOR) 20 MG tablet Take 1 tablet (20 mg) by mouth daily 60 tablet 2     spironolactone (ALDACTONE) 50 MG tablet Take 1 tablet (50 mg) by mouth daily 90 tablet 0     tamsulosin (FLOMAX) 0.4 MG capsule Take 1 capsule (0.4 mg) by mouth daily 90 capsule 3     traZODone (DESYREL) 50 MG tablet Take 1 tablet (50 mg) by mouth At Bedtime 90 tablet 0     furosemide (LASIX) 20 MG tablet Take 1 tablet (20 mg) by mouth daily (Patient not taking: Reported on 4/22/2019) 90 tablet 0         ALLERGIES:  Allergies   Allergen Reactions     Codeine Other (See Comments)     Cannot take due to liver  Cannot tolerate oral narcotics         PAST MEDICAL HISTORY:  Past Medical History:   Diagnosis Date     Anemia 2013    Low blood plates current is 37     Arthritis      BPH (benign prostatic hyperplasia)      CAD (coronary artery disease) 4/1/2019     Cholelithiasis      Conductive hearing loss 8/16/2017    Have a lump on my right side of my face.  Had wax discharge     Depressive disorder 1986    Suffer effects throughout life     Gastroesophageal reflux disease 12/1/2014    Being treated with Prilosac     HCC (hepatocellular carcinoma) (H) 1/22/2019     Hepatitis 2014    Diagnosed with schrosis ESTRADA in 2014.  Suffer from hepatatie     HTN (hypertension)      Hyperlipidemia      Liver cirrhosis secondary to ESTRADA (H)      Portal vein thrombosis 4/11/2019     Type II diabetes mellitus (H)      Insulin adminstered BID daily.          PAST SURGICAL HISTORY:  Past Surgical History:   Procedure Laterality Date     COLONOSCOPY      2015     CV HEART CATHETERIZATION WITH POSSIBLE INTERVENTION N/A 2/26/2019    Procedure: CORS;  Surgeon: Jagdish Hoyt MD;  Location:  HEART CARDIAC CATH LAB     ESOPHAGOSCOPY, GASTROSCOPY, DUODENOSCOPY (EGD), COMBINED N/A 11/17/2016    Procedure: COMBINED ESOPHAGOSCOPY, GASTROSCOPY, DUODENOSCOPY (EGD);  Surgeon: Santi Rosas MD;  Location:  GI     ESOPHAGOSCOPY, GASTROSCOPY, DUODENOSCOPY (EGD), COMBINED N/A 11/17/2017    Procedure: COMBINED ESOPHAGOSCOPY, GASTROSCOPY, DUODENOSCOPY (EGD);  EGD;  Surgeon: Santi Rosas MD;  Location:  GI     ESOPHAGOSCOPY, GASTROSCOPY, DUODENOSCOPY (EGD), COMBINED N/A 12/28/2018    Procedure: EGD;  Surgeon: Santi Rosas MD;  Location:  OR     HEAD & NECK SURGERY      12/2017 at [facility].      IMPLANT GOLD WEIGHT EYELID Right 11/16/2017    Procedure: IMPLANT WEIGHT EYELID;  Right Upper Eyelid Weight, right tarsal strip lower eyelid;  Surgeon: Milana Malave MD;  Location:  OR     IR CHEMO EMBOLIZATION  1/22/2019     KNEE SURGERY Left      ORTHOPEDIC SURGERY       PAROTIDECTOMY, RADICAL NECK DISSECTION Right 11/2/2017    Procedure: PAROTIDECTOMY, RADICAL NECK DISSECTION;  Right Superfacial Parotidectomy , Facial nerve repair. with [facility] facial nerve monitor.;  Surgeon: Asiya Morgan MD;  Location: [facility] OR     PICC INSERTION Left 11/06/2017    4fr SL BioFlo PICC, 44cm in the L basilic vein w/ tip in the low SVC     VASCULAR SURGERY           SOCIAL HISTORY:  Social History     Socioeconomic History     Marital status:      Spouse name: Not on file     Number of children: Not on file     Years of education: Not on file     Highest education level: Not on file   Occupational History     Not on file   Social Needs     Financial resource strain: Not on file     Food insecurity:     Worry: Not on  file     Inability: Not on file     Transportation needs:     Medical: Not on file     Non-medical: Not on file   Tobacco Use     Smoking status: Never Smoker     Smokeless tobacco: Former User     Types: Chew     Tobacco comment: 1 tin per week   Substance and Sexual Activity     Alcohol use: No     Alcohol/week: 0.0 oz     Comment: quit 1996     Drug use: No     Sexual activity: Not Currently     Partners: Female     Birth control/protection: Condom   Lifestyle     Physical activity:     Days per week: Not on file     Minutes per session: Not on file     Stress: Not on file   Relationships     Social connections:     Talks on phone: Not on file     Gets together: Not on file     Attends Yazidism service: Not on file     Active member of club or organization: Not on file     Attends meetings of clubs or organizations: Not on file     Relationship status: Not on file     Intimate partner violence:     Fear of current or ex partner: Not on file     Emotionally abused: Not on file     Physically abused: Not on file     Forced sexual activity: Not on file   Other Topics Concern     Parent/sibling w/ CABG, MI or angioplasty before 65F 55M? Yes   Social History Narrative     Not on file         FAMILY HISTORY:  Family History   Problem Relation Age of Onset     Prostate Cancer Maternal Grandfather      Substance Abuse Maternal Grandfather         Alcohol     Colon Cancer Father 60     Pancreatic Cancer Father 60     Prostate Cancer Father      Colorectal Cancer Father      Macular Degeneration Father      Cancer Father      Glaucoma Father      Skin Cancer Father      Colorectal Cancer Maternal Grandmother      Cancer Maternal Grandmother      Substance Abuse Maternal Grandmother         Alcohol     Colorectal Cancer Paternal Grandmother      Cancer Mother      Diabetes Mother          3/2016     Cerebrovascular Disease Mother         Passed away in Feb of this year, 80 years old.     Thyroid Disease  "Mother      Depression Mother      Asthma Sister         Had since birth     Thyroid Disease Sister      Depression Sister      Liver Disease No family hx of      Melanoma No family hx of          PHYSICAL EXAM:  Vital signs:  BP 91/49 (BP Location: Left arm, Patient Position: Sitting, Cuff Size: Adult Regular)   Pulse 67   Temp 98.3  F (36.8  C) (Oral)   Resp 18   Ht 1.753 m (5' 9.02\")   Wt 81.2 kg (179 lb)   SpO2 93%   BMI 26.42 kg/m     ECO  GENERAL/CONSTITUTIONAL: No acute distress.  EYES: No scleral icterus.  RESPIRATORY: Clear to auscultation bilaterally. No crackles or wheezing.   CARDIOVASCULAR: Regular rate and rhythm without murmurs, gallops, or rubs.  GASTROINTESTINAL: No tenderness. The patient has normal bowel sounds. No guarding.    MUSCULOSKELETAL: Warm and well-perfused.  NEUROLOGIC: Alert, oriented, answers questions appropriately.  INTEGUMENTARY: No jaundice.      LABS:  CBC RESULTS:   Recent Labs   Lab Test 19  1154   WBC 4.2   RBC 3.91*   HGB 13.7   HCT 42.4   *   MCH 35.0*   MCHC 32.3   RDW 14.5   PLT 41*     Recent Labs   Lab Test 19  0711 19  1154    139   POTASSIUM 4.0 4.3   CHLORIDE 104 109   CO2 27 24   ANIONGAP 7 6   * 148*   BUN 28 19   CR 1.49* 1.12   DEBORAH 9.2 9.0     Lab Results   Component Value Date    AST 38 2019     Lab Results   Component Value Date    ALT 34 2019     No results found for: BILICONJ   Lab Results   Component Value Date    BILITOTAL 1.3 2019     Lab Results   Component Value Date    ALBUMIN 3.0 2019     Lab Results   Component Value Date    PROTTOTAL 7.4 2019      Lab Results   Component Value Date    ALKPHOS 120 2019     Component      Latest Ref Rng & Units 2018   Alpha Fetoprotein      0 - 8 ug/L 5.2 4.2 5.5           IMAGING:  MRI abdomen 19:  1. Redemonstrated treatment and changes in the left lobe of the liver  without evidence of recurrence. LIRADS " TR nonviable. No new lesion.   2. Cirrhotic morphology of the liver and evidence of portal  hypertension, including splenomegaly and large paraesophageal varices  and splenorenal shunts.  3. Resolution of previously seen ascites.  4. New small right pleural effusion.  5. No significant change in nonocclusive bland appearing SMV  thrombosis.  6. Based on this exam alone, the patient is within [place] criteria.      ASSESSMENT/PLAN:  Frandy Workman is a 53 year old male with:    1) Hepatocellular carcinoma: Child-Clark A (6).  Patient has a 3.1 cm lesion in segment 4b and 1.1 cm lesion in segment 3.  His CT chest and bone scan were negative for metastatic disease.    He underwent TACE on 1/22/19 to segment IV lesion and CT-guided microwave ablation on 1/23/19 to segment III lesion.    MRI abdomen on 4/12/19 showed treatment changes without evidence of recurrence.  No new lesions.    He is on liver transplant list.  He no longer follows with IR  -MRI and labs/AFP in 3 months  -RTC in 3 months    2) Cirrhosis: secondary to ESTRADA  -follows with hepatology - Dr. Banuelos    3) Thrombocytopenia: This has been a chronic issue and likely secondary to his history of liver disease.  Platelet count at baseline.    4) Diabetes:   -follows with endocrinology      I spent a total of 25 minutes with the patient, with over >50% of the time in counseling and/or coordination of care.       Amanda Lees MD  Hematology/Oncology  [facility]

## 2019-04-22 NOTE — PROGRESS NOTES
Manny Bennett,     Given the end stage liver disease, I think current dose of Metformin is reasonable as long as BG is well controlled.   Agree with Tresiba adjustments.     Thanks   Yogesh    Previous Messages      ----- Message -----   From: Donald Bradley, [facility]   Sent: 4/17/2019  11:25 AM   To: MD Dr. Jeri Arguelles,     I saw patient for a follow up concerning his diabetes, here is my recommendations/ FYI:     1. Fasting blood sugars ranging , majority are <100, had him drop Tresiba by another 10%. He will continue to taper by 3 units every week if the majority of his fasting BG are <100. I will see him in 1 month.   2. Patient is tolerating Metformin ER 500mg daily. Recommend increasing to 1000mg daily.     Let me know if you agree with the Metformin dose increase or want me to change anything else! Note on 4/17 has BG in it.     Thanks!     Donald Bradley, PharmD   [facility] Pharmacist     Phone: [phone]

## 2019-04-22 NOTE — NURSING NOTE
"Oncology Rooming Note    April 22, 2019 11:10 AM   Frandy Workman is a 54 year old male who presents for:    Chief Complaint   Patient presents with     Oncology Clinic Visit     New patient visit related to cirrhosis of Liver     Initial Vitals: BP 91/49 (BP Location: Left arm, Patient Position: Sitting, Cuff Size: Adult Regular)   Pulse 67   Temp 98.3  F (36.8  C) (Oral)   Resp 18   Ht 1.753 m (5' 9.02\")   Wt 81.2 kg (179 lb)   SpO2 93%   BMI 26.42 kg/m   Estimated body mass index is 26.42 kg/m  as calculated from the following:    Height as of this encounter: 1.753 m (5' 9.02\").    Weight as of this encounter: 81.2 kg (179 lb). Body surface area is 1.99 meters squared.  Moderate Pain (5) Comment: Data Unavailable   No LMP for male patient.  Allergies reviewed: Yes  Medications reviewed: Yes    Medications: Medication refills not needed today.  Pharm[facility] entered into [facility]: [facility] [address] [place] [address]    Clinical concerns: No new concerns. Provider was notified.      Raquel Kerr LPN            "

## 2019-04-24 ENCOUNTER — OFFICE VISIT (OUTPATIENT)
Dept: OPHTHALMOLOGY | Facility: CLINIC | Age: 54
End: 2019-04-24
Attending: OPHTHALMOLOGY
Payer: MEDICARE

## 2019-04-24 DIAGNOSIS — E11.3213 TYPE 2 DIABETES MELLITUS WITH MILD NONPROLIFERATIVE RETINOPATHY OF BOTH EYES AND MACULAR EDEMA, UNSPECIFIED WHETHER LONG TERM INSULIN USE (H): Primary | ICD-10-CM

## 2019-04-24 PROCEDURE — 25000128 H RX IP 250 OP 636: Mod: ZF | Performed by: OPHTHALMOLOGY

## 2019-04-24 PROCEDURE — 67028 INJECTION EYE DRUG: CPT | Mod: 50,ZF | Performed by: OPHTHALMOLOGY

## 2019-04-24 PROCEDURE — G0463 HOSPITAL OUTPT CLINIC VISIT: HCPCS | Mod: ZF

## 2019-04-24 RX ADMIN — AFLIBERCEPT 2 MG: 40 INJECTION, SOLUTION INTRAVITREAL at 14:09

## 2019-04-24 ASSESSMENT — REFRACTION_WEARINGRX
SPECS_TYPE: PAL
OD_SPHERE: -7.25
OD_CYLINDER: +0.75
OD_ADD: +2.00
OD_AXIS: 132
OS_ADD: +2.00
OS_AXIS: 040
OS_CYLINDER: +0.75
OS_SPHERE: -7.25

## 2019-04-24 ASSESSMENT — VISUAL ACUITY
OD_CC+: -1
CORRECTION_TYPE: GLASSES
OS_CC: 20/25
OD_CC: 20/30
METHOD: SNELLEN - LINEAR

## 2019-04-24 ASSESSMENT — TONOMETRY
IOP_METHOD: ICARE
OS_IOP_MMHG: 14
OD_IOP_MMHG: 15

## 2019-04-24 NOTE — NURSING NOTE
Chief Complaints and History of Present Illnesses   Patient presents with     Diabetic Retinopathy Follow Up     Chief Complaint(s) and History of Present Illness(es)     Diabetic Retinopathy Follow Up     Laterality: both eyes    Onset: 1 month ago              Comments     Pt. States that he did have pain for a week following last injection.  VA has improved BE since last injection.  Come itching BE this morning.   Yvonne Teena COT 1:39 PM April 24, 2019

## 2019-04-24 NOTE — PROGRESS NOTES
CC: blurry vision  HPI: No significant changes in vision, no flashes and floaters   Plan for Injection only today changing to eylea  Follow up in 4 weeks Optical Coherence Tomography and possible eylea both eyes, no dilate  Post-op injection instructions given to the patient     ~~~~~~~~~~~~~~~~~~~~~~~~~~~~~~~~~~   Complete documentation of historical and exam elements from today's encounter can be found in the full encounter summary report (not reduplicated in this progress note).  I personally obtained the chief complaint(s) and history of present illness.  I confirmed and edited as necessary the review of systems, past medical/surgical history, family history, social history, and examination findings as documented by others; and I examined the patient myself.  I personally reviewed the relevant tests, images, and reports as documented above.  I formulated and edited as necessary the assessment and plan and discussed the findings and management plan with the patient and family and No resident or fellow assisted with the procedures performed.  I performed the procedures myself.    Enriqueta Martin MD  .  Retina Service   Department of Ophthalmology and Visual Neurosciences   [facility]  Phone: [phone]   Fax: [phone]

## 2019-05-01 DIAGNOSIS — K74.60 LIVER CIRRHOSIS SECONDARY TO NASH (H): ICD-10-CM

## 2019-05-01 DIAGNOSIS — K75.81 LIVER CIRRHOSIS SECONDARY TO NASH (H): ICD-10-CM

## 2019-05-01 LAB
ANION GAP SERPL CALCULATED.3IONS-SCNC: 5 MMOL/L (ref 3–14)
BUN SERPL-MCNC: 30 MG/DL (ref 7–30)
CALCIUM SERPL-MCNC: 10.2 MG/DL (ref 8.5–10.1)
CHLORIDE SERPL-SCNC: 108 MMOL/L (ref 94–109)
CO2 SERPL-SCNC: 25 MMOL/L (ref 20–32)
CREAT SERPL-MCNC: 1.65 MG/DL (ref 0.66–1.25)
GFR SERPL CREATININE-BSD FRML MDRD: 46 ML/MIN/{1.73_M2}
GLUCOSE SERPL-MCNC: 116 MG/DL (ref 70–99)
POTASSIUM SERPL-SCNC: 4.8 MMOL/L (ref 3.4–5.3)
SODIUM SERPL-SCNC: 139 MMOL/L (ref 133–144)

## 2019-05-03 ENCOUNTER — TELEPHONE (OUTPATIENT)
Dept: GASTROENTEROLOGY | Facility: CLINIC | Age: 54
End: 2019-05-03

## 2019-05-03 DIAGNOSIS — K74.60 LIVER CIRRHOSIS SECONDARY TO NASH (H): Primary | ICD-10-CM

## 2019-05-03 DIAGNOSIS — K75.81 LIVER CIRRHOSIS SECONDARY TO NASH (H): Primary | ICD-10-CM

## 2019-05-03 NOTE — TELEPHONE ENCOUNTER
Called patient to let him know per Dr. cMdonnell's (covering for Dr. Banuelos), patient lab's show worsening kidney function.  The plan is to hold diuretics and schedule with nephrology.      Referral placed and message sent to schedulers to help patient get scheduled.      Patient expressed understanding and had no further questions.

## 2019-05-09 ENCOUNTER — TELEPHONE (OUTPATIENT)
Dept: TRANSPLANT | Facility: CLINIC | Age: 54
End: 2019-05-09

## 2019-05-09 NOTE — TELEPHONE ENCOUNTER
"Transplant Social Work Services Phone Call      Data: Miller was listed for a Liver Transplant on 4/9/19.  Intervention: I called patient and provided supportive counseling.  Assessment: Miller asked appropriate questions about liver disease and the transplant process.  He reports fatigue and feeling cold.  He reports \"getting frustrated being so tired.\"  Education provided by SW: Liver Transplant Support Group  Plan: I will remain available for Miller's psychosocial needs.      ALEXYS Mcnair, [facility]  Liver Transplant   Phone [phone]  Pager [phone]   "

## 2019-05-23 ENCOUNTER — OFFICE VISIT (OUTPATIENT)
Dept: PHARMACY | Facility: CLINIC | Age: 54
End: 2019-05-23
Payer: COMMERCIAL

## 2019-05-23 ENCOUNTER — OFFICE VISIT (OUTPATIENT)
Dept: OPHTHALMOLOGY | Facility: CLINIC | Age: 54
End: 2019-05-23
Attending: OPHTHALMOLOGY
Payer: MEDICARE

## 2019-05-23 ENCOUNTER — OFFICE VISIT (OUTPATIENT)
Dept: ENDOCRINOLOGY | Facility: CLINIC | Age: 54
End: 2019-05-23
Payer: MEDICARE

## 2019-05-23 DIAGNOSIS — E11.3213 TYPE 2 DIABETES MELLITUS WITH MILD NONPROLIFERATIVE RETINOPATHY OF BOTH EYES AND MACULAR EDEMA, UNSPECIFIED WHETHER LONG TERM INSULIN USE (H): ICD-10-CM

## 2019-05-23 DIAGNOSIS — E11.8 TYPE 2 DIABETES MELLITUS WITH COMPLICATION, UNSPECIFIED WHETHER LONG TERM INSULIN USE: Primary | ICD-10-CM

## 2019-05-23 DIAGNOSIS — E78.5 HYPERLIPIDEMIA LDL GOAL <100: ICD-10-CM

## 2019-05-23 DIAGNOSIS — S90.811A ABRASION OF RIGHT FOOT, INITIAL ENCOUNTER: ICD-10-CM

## 2019-05-23 DIAGNOSIS — E11.49 TYPE II OR UNSPECIFIED TYPE DIABETES MELLITUS WITH NEUROLOGICAL MANIFESTATIONS, NOT STATED AS UNCONTROLLED(250.60) (H): ICD-10-CM

## 2019-05-23 DIAGNOSIS — L60.2 ONYCHAUXIS: Primary | ICD-10-CM

## 2019-05-23 PROCEDURE — G0463 HOSPITAL OUTPT CLINIC VISIT: HCPCS | Mod: ZF

## 2019-05-23 PROCEDURE — 67028 INJECTION EYE DRUG: CPT | Mod: 50,ZF | Performed by: OPHTHALMOLOGY

## 2019-05-23 PROCEDURE — 25000128 H RX IP 250 OP 636: Mod: ZF | Performed by: OPHTHALMOLOGY

## 2019-05-23 PROCEDURE — 99606 MTMS BY PHARM EST 15 MIN: CPT | Performed by: PHARMACIST

## 2019-05-23 PROCEDURE — 99607 MTMS BY PHARM ADDL 15 MIN: CPT | Performed by: PHARMACIST

## 2019-05-23 PROCEDURE — 92134 CPTRZ OPH DX IMG PST SGM RTA: CPT | Mod: ZF | Performed by: OPHTHALMOLOGY

## 2019-05-23 PROCEDURE — 40000269 ZZH STATISTIC NO CHARGE FACILITY FEE: Mod: ZF

## 2019-05-23 RX ADMIN — AFLIBERCEPT 2 MG: 40 INJECTION, SOLUTION INTRAVITREAL at 13:18

## 2019-05-23 ASSESSMENT — TONOMETRY
OD_IOP_MMHG: 14
OS_IOP_MMHG: 16
IOP_METHOD: TONOPEN

## 2019-05-23 ASSESSMENT — REFRACTION_WEARINGRX
SPECS_TYPE: PAL
OS_AXIS: 040
OS_CYLINDER: +0.75
OD_ADD: +2.00
OS_SPHERE: -7.25
OS_ADD: +2.00
OD_CYLINDER: +0.75
OD_SPHERE: -7.25
OD_AXIS: 132

## 2019-05-23 ASSESSMENT — VISUAL ACUITY
OS_CC+: -2
OS_CC: 20/25
METHOD: SNELLEN - LINEAR
CORRECTION_TYPE: GLASSES
OD_CC: 20/40

## 2019-05-23 ASSESSMENT — CONF VISUAL FIELD
OD_NORMAL: 1
OS_NORMAL: 1

## 2019-05-23 NOTE — PROGRESS NOTES
CC:  Follow up dme left eye     HPI: Frandy Workman is a  54 year old year-old patient with history of Diabetes mellitus for 24 ys . Patient on insulin.  HBA1c 6.6 08/08/18. Patient was evaluated by dr. Amezquita and sent to the retina clinic for management of Diabetic macular edema     Interval History: Feels that left eye vision is getting worse. Last HbA1C 6.1 on 2/11/19.    Retinal Imaging:  OCT 5-23-19  RE: resolved Diabetic macular edema  LE: resolved Diabetic macular edema    fluorescein angiography transits left eye 3-28-19  Right eye: diffuse microaneurysms, late macula leakage; moderate peripheral capillary non perfusion; significant vessel leakage in late phase, no NVD/NVE  Left eye: diffuse microaneurysms, late macula leakage; moderate peripheral capillary non perfusion;  significant vessel leakage in late phase, no NVD/NVE    Optos consistnet with clinical exam    Assessment & Plan:  1.  Severe nonproliferative diabetic retinopathy of both eyes    - Blood pressure (<120/80) and blood glucose (HbA1c <7.0) control discussed with patient. Patient advised  that failure to adequately control each may lead to vision loss. The patient expressed understanding.    2. Diabetic macular edema left eye    - status post avastin x 4. Switch to Eylea last eye examination with resolution of Diabetic macular edema      3. Diabetic macular edema right eye   - status post avastin. Switch to Eylea last eye examination with resolution of Diabetic macular edema           3. Myopia, bilateral  Prescription given last yuval     4. Senile nuclear sclerosis, bilateral  Comment: Not visually significant OU  Plan:  No treatment indicated. Monitor annually.    Plan: follow up 6 weeks for inj only both eyes   Could consider T&E     ~~~~~~~~~~~~~~~~~~~~~~~~~~~~~~~~~~   Complete documentation of historical and exam elements from today's encounter can be found in the full encounter summary report (not reduplicated in this progress  note).  I personally obtained the chief complaint(s) and history of present illness.  I confirmed and edited as necessary the review of systems, past medical/surgical history, family history, social history, and examination findings as documented by others; and I examined the patient myself.  I personally reviewed the relevant tests, images, and reports as documented above.  I personally reviewed the ophthalmic test(s) associated with this encounter, agree with the interpretation(s) as documented by the resident/fellow, and have edited the corresponding report(s) as necessary.   I formulated and edited as necessary the assessment and plan and discussed the findings and management plan with the patient and family and No resident or fellow assisted with the procedures performed.  I performed the procedures myself.    Enriqueta Martin MD   of Ophthalmology.  Retina Service   Department of Ophthalmology and Visual Neurosciences   [facility]  Phone: [phone]   Fax: [phone]

## 2019-05-23 NOTE — PROGRESS NOTES
SUBJECTIVE/OBJECTIVE:                Frandy Workman is a 54 year old male coming in for a follow up MTM appt to discuss diabetes.     Chief Complaint: Has been snacking heavily in the middle of the night, leading to high BG in the morning. Headaches and majority or pruritis in lower extremities has resolved.     Allergies/ADRs: Reviewed in Epic  Tobacco: History of tobacco dependence - quit 2017   Alcohol: not currently using  Caffeine: 1-3 cups/day of coffee  Activity: none, pt is disabled due to liver disease. Pt is working towards getting on the transplant list.     Medication Adherence/Access:  Patient uses pill box(es).  Per patient, misses medication 0 times per week. Rarely misses doses.     Hyperlipidemia: Current therapy includes Crestor 20mg daily . Side effects have pretty much resolved. Itching still there but reduced. Decreasing Crestor to 10mg had no effect, so he increased it back to 20mg which he is tolerating.   The 10-year ASCVD risk score (Gayathri HAWKINS Jr., et al., 2013) is: 7.5%    Values used to calculate the score:      Age: 54 years      Sex: Male      Is Non- : No      Diabetic: Yes      Tobacco smoker: No      Systolic Blood Pressure: 91 mmHg      Is BP treated: Yes      HDL Cholesterol: 26 mg/dL      Total Cholesterol: 131 mg/dL    Diabetes:  Pt currently taking Farxiga 10mg daily, Metformin ER 500mg daily, Tresiba 80 units daily, Novolog sliding scale . Pt's diarrhea has resolved.   SMBG: two times daily. Ranges (per BGM):  Date FBG/ 2hours post Dinner /2hours post   5/23 170    5/22 97 99   5/21 144 94   5/20 171 137   5/19 165 108   5/18 138 99   5/17 150 86   5/16 132 144   5/15 192 105   5/14 224 139   5/13 151 160     Date FBG/ 2hours post Lunch/2hours post Dinner /2hours post   4/17 82     4/16 121  136   4/15 145 (snacked at nigh)  77   4/14 92 130    4/13 80 140 67, 89   4/12 168, 117     4/11 69  161   4/10 108  86   4/9 75  105   4/8 96  109   4/7 86  101    Patient is experiencing hypoglycemia. Frequency of hypoglycemia? None in the past week. Symptoms of low blood sugar? Sweaty, lightheadedness.   Diet: Pt has been waking up over night snacking: Oranges, cranberry shakeel juice (5 calories per serving), other high carb snacks.   Recent symptoms of high blood sugar? none   Aspirin: Taking 81mg daily and denies side effects      Today's Vitals: There were no vitals taken for this visit.    ASSESSMENT:                 Current medications were reviewed today.      Medication Adherence: good, no issues identified    Hyperlipidemia: SE resolved. Tolerating 20mg of Crestor well.      Diabetes:  Needs improvement. FBG has increased greatly since our last visit, likely due to his over night high carb snacks. Instructed patient to replace these snacks with low carb options and move Metformin back to evening. Evening blood sugars are still very well controlled. Last A1c well controlled at 6.1%.     PLAN:                Pt to...  1. Avoiding snacking overnight or replace high carb snacks with low carb options.  2. Move Metformin to bedtime.     I spent 30 minutes with this patient today. I offer these suggestions for consideration by PCP/ Hepatology/ Endocrine. A copy of the visit note was provided to the patient's primary care provider.    Will follow up in 4 months.    The patient was given a summary of these recommendations as an after visit summary.    Donald Bradley, PharmD  [facility] Pharmacist    Phone: [phone]

## 2019-05-23 NOTE — NURSING NOTE
Chief Complaints and History of Present Illnesses   Patient presents with     Diabetic Retinopathy Follow Up     Chief Complaint(s) and History of Present Illness(es)     Diabetic Retinopathy Follow Up     Laterality: both eyes    Associated symptoms: floaters and photophobia.  Negative for eye pain and flashes (at times)              Comments     1 month follow up  Blood sugar 170 this am, last A1C 6.3 taken 4-2019  BE itchy   Catherine LEONG 12:27 PM May 23, 2019

## 2019-05-23 NOTE — PROGRESS NOTES
Past Medical History:   Diagnosis Date     Anemia 2013    Low blood plates current is 37     Arthritis      BPH (benign prostatic hyperplasia)      CAD (coronary artery disease) 4/1/2019     Cholelithiasis      Conductive hearing loss 8/16/2017    Have a lump on my right side of my face.  Had wax discharge     Depressive disorder 1986    Suffer effects throughout life     Gastroesophageal reflux disease 12/1/2014    Being treated with Prilosac     HCC (hepatocellular carcinoma) (H) 1/22/2019     Hepatitis 2014    Diagnosed with schrosis ESTRADA in 2014.  Suffer from hepatatie     HTN (hypertension)      Hyperlipidemia      Liver cirrhosis secondary to ESTRADA (H)      Portal vein thrombosis 4/11/2019     Type II diabetes mellitus (H)     Insulin adminstered BID daily.      Patient Active Problem List   Diagnosis     Hepatic cirrhosis (H)     Type II diabetes mellitus (H)     Bipolar affective disorder in remission (H)     Esophageal varices (H)     Nonalcoholic steatohepatitis (ESTRADA)     Brow ptosis     Paralytic lagophthalmos of right upper eyelid     Erectile dysfunction due to diseases classified elsewhere     HCC (hepatocellular carcinoma) (H)     Acute pain of both shoulders     Equivocal stress echocardiogram     Type 2 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, unspecified whether long term insulin use (H)     Abnormal findings diagnostic imaging of heart and coronary circulation     Status post coronary angiogram     CAD (coronary artery disease)     Portal vein thrombosis     Past Surgical History:   Procedure Laterality Date     COLONOSCOPY      2015     CV HEART CATHETERIZATION WITH POSSIBLE INTERVENTION N/A 2/26/2019    Procedure: CORS;  Surgeon: Jagdish Hoyt MD;  Location:  HEART CARDIAC CATH LAB     ESOPHAGOSCOPY, GASTROSCOPY, DUODENOSCOPY (EGD), COMBINED N/A 11/17/2016    Procedure: COMBINED ESOPHAGOSCOPY, GASTROSCOPY, DUODENOSCOPY (EGD);   Surgeon: Santi Rosas MD;  Location: [facility]     ESOPHAGOSCOPY, GASTROSCOPY, DUODENOSCOPY (EGD), COMBINED N/A 11/17/2017    Procedure: COMBINED ESOPHAGOSCOPY, GASTROSCOPY, DUODENOSCOPY (EGD);  EGD;  Surgeon: Santi Rosas MD;  Location: [facility] GI     ESOPHAGOSCOPY, GASTROSCOPY, DUODENOSCOPY (EGD), COMBINED N/A 12/28/2018    Procedure: EGD;  Surgeon: Santi Rosas MD;  [facility]: [facility]     HEAD & NECK SURGERY      12/2017 at [facility].      IMPLANT GOLD WEIGHT EYELID Right 11/16/2017    Procedure: IMPLANT WEIGHT EYELID;  Right Upper Eyelid Weight, right tarsal strip lower eyelid;  Surgeon: Milana Malave MD;  Location:  OR     IR CHEMO EMBOLIZATION  1/22/2019     KNEE SURGERY Left      ORTHOPEDIC SURGERY       PAROTIDECTOMY, RADICAL NECK DISSECTION Right 11/2/2017    Procedure: PAROTIDECTOMY, RADICAL NECK DISSECTION;  Right Superfacial Parotidectomy , Facial nerve repair. with [facility] facial nerve monitor.;  Surgeon: Asiya Morgan MD;  Location: [facility] OR     PICC INSERTION Left 11/06/2017    4fr SL BioFlo PICC, 44cm in the L basilic vein w/ tip in the low SVC     VASCULAR SURGERY       Social History     Socioeconomic History     Marital status:      Spouse name: Not on file     Number of children: Not on file     Years of education: Not on file     Highest education level: Not on file   Occupational History     Not on file   Social Needs     Financial resource strain: Not on file     Food insecurity:     Worry: Not on file     Inability: Not on file     Transportation needs:     Medical: Not on file     Non-medical: Not on file   Tobacco Use     Smoking status: Never Smoker     Smokeless tobacco: Former User     Types: Chew     Tobacco comment: 1 tin per week   Substance and Sexual Activity     Alcohol use: No     Alcohol/week: 0.0 oz     Comment: quit Sept. 1996     Drug use: No     Sexual activity: Not Currently     Partners: Female     Birth control/protection: Condom   Lifestyle      Physical activity:     Days per week: Not on file     Minutes per session: Not on file     Stress: Not on file   Relationships     Social connections:     Talks on phone: Not on file     Gets together: Not on file     Attends Islam service: Not on file     Active member of club or organization: Not on file     Attends meetings of clubs or organizations: Not on file     Relationship status: Not on file     Intimate partner violence:     Fear of current or ex partner: Not on file     Emotionally abused: Not on file     Physically abused: Not on file     Forced sexual activity: Not on file   Other Topics Concern     Parent/sibling w/ CABG, MI or angioplasty before 65F 55M? Yes   Social History Narrative     Not on file     Family History   Problem Relation Age of Onset     Prostate Cancer Maternal Grandfather      Substance Abuse Maternal Grandfather         Alcohol     Colon Cancer Father 60     Pancreatic Cancer Father 60     Prostate Cancer Father      Colorectal Cancer Father      Macular Degeneration Father      Cancer Father      Glaucoma Father      Skin Cancer Father      Colorectal Cancer Maternal Grandmother      Cancer Maternal Grandmother      Substance Abuse Maternal Grandmother         Alcohol     Colorectal Cancer Paternal Grandmother      Cancer Mother      Diabetes Mother          3/2016     Cerebrovascular Disease Mother         Passed away in Feb of this year, 80 years old.     Thyroid Disease Mother      Depression Mother      Asthma Sister         Had since birth     Thyroid Disease Sister      Depression Sister      Liver Disease No family hx of      Melanoma No family hx of      Lab Results   Component Value Date    A1C 6.6 2018    A1C 6.5 2017    A1C 7.8 10/25/2016     Lab Results   Component Value Date    WBC 4.2 2019     Lab Results   Component Value Date    RBC 3.91 2019     Lab Results   Component Value Date    HGB 13.7 2019     Lab Results    Component Value Date    HCT 42.4 04/01/2019     No components found for: MCT  Lab Results   Component Value Date     04/01/2019     Lab Results   Component Value Date    MCH 35.0 04/01/2019     Lab Results   Component Value Date    MCHC 32.3 04/01/2019     Lab Results   Component Value Date    RDW 14.5 04/01/2019     Lab Results   Component Value Date    PLT 41 04/01/2019     Last Comprehensive Metabolic Panel:  Sodium   Date Value Ref Range Status   05/01/2019 139 133 - 144 mmol/L Final     Potassium   Date Value Ref Range Status   05/01/2019 4.8 3.4 - 5.3 mmol/L Final     Chloride   Date Value Ref Range Status   05/01/2019 108 94 - 109 mmol/L Final     Carbon Dioxide   Date Value Ref Range Status   05/01/2019 25 20 - 32 mmol/L Final     Anion Gap   Date Value Ref Range Status   05/01/2019 5 3 - 14 mmol/L Final     Glucose   Date Value Ref Range Status   05/01/2019 116 (H) 70 - 99 mg/dL Final     Urea Nitrogen   Date Value Ref Range Status   05/01/2019 30 7 - 30 mg/dL Final     Creatinine   Date Value Ref Range Status   05/01/2019 1.65 (H) 0.66 - 1.25 mg/dL Final     GFR Estimate   Date Value Ref Range Status   05/01/2019 46 (L) >60 mL/min/[1.73_m2] Final     Comment:     Non  GFR Calc  Starting 12/18/2018, serum creatinine based estimated GFR (eGFR) will be   calculated using the Chronic Kidney Disease Epidemiology Collaboration   (CKD-EPI) equation.       Calcium   Date Value Ref Range Status   05/01/2019 10.2 (H) 8.5 - 10.1 mg/dL Final     Lab Results   Component Value Date    AST 38 04/12/2019     Lab Results   Component Value Date    ALT 34 04/12/2019     No results found for: BILICONJ   Lab Results   Component Value Date    BILITOTAL 1.3 04/12/2019     Lab Results   Component Value Date    ALBUMIN 3.0 04/12/2019     Lab Results   Component Value Date    PROTTOTAL 7.4 04/12/2019      Lab Results   Component Value Date    ALKPHOS 120 04/12/2019     SUBJECTIVE FINDINGS:  A 54-year-old  male who returns to clinic for diabetic foot check and toenail care.  He relates that he is doing well.  He relates he gets numbness and tingling in toes and his feet feel warm at night, no ulcers or sores since we seen him last.  Relates he tripped and got an abrasion on his right foot a couple of months ago that seems to be healing.     OBJECTIVE FINDINGS:  DP and PT are 2/4 bilaterally.  He has dorsally contracted digits 2 through 5 bilaterally.  He has dorsomedial first MPJ prominence and laterally deviated hallux bilaterally.  He has minimal hyperkeratotic tissue build up plantar 2-4 mpjs bilaterally.  He has a yuniel eschar dorsal right 2-3 mpj.  There is no erythema, drainage, no odor, no calor bilaterally.  There are no gross tendon voids bilaterally.  He has incurvated nails 1 through 5 bilaterally.        ASSESSMENT AND PLAN:  Onychauxis bilaterally.  Diabetes with peripheral neuropathy.  Abrasion right dorsal foot.  Diagnosis and treatment discussed with him.  Betadine dispensed and use discussed with him for right foot abrasion.  He will return to clinic and see me in about 3 months.  All the nails were reduced bilaterally upon consent.

## 2019-05-23 NOTE — PATIENT INSTRUCTIONS
Recommendations from today's MTM visit:                                                      1. Change your middle of the night snacking to low carb and low sugar snacks such as nuts, peanut butter, small amounts of blueberries, veggies, lean meats. Cut back on the juice.     2. Move Metformin to bedtime again to help with your morning sugars. You can continue taking Farxiga in the morning.     Next MTM visit: 9/19/19 at 1:30pm.     To schedule another MTM appointment, please call the clinic directly or you may call the MTM scheduling line at [phone] or toll-free at [phone].     My Clinical Pharmacist's contact information:                                                      It was a pleasure talking with you today!  Please feel free to contact me with any questions or concerns you have.      Donald Bradley, PharmD  [facility] Pharmacist    Phone: [phone]     You may receive a survey about the MTM services you received by email and/or US Mail.  I would appreciate your feedback to help me serve you better in the future. Your comments will be anonymous.

## 2019-05-23 NOTE — LETTER
5/23/2019     RE: Frandy Workman  [address] [facility] [address]     Dear Colleague,    Thank you for referring your patient, Frandy Workman, to the [facility] ENDOCRINOLOGY at [facility]. Please see a copy of my visit note below.    Past Medical History:   Diagnosis Date     Anemia 2013    Low blood plates current is 37     Arthritis      BPH (benign prostatic hyperplasia)      CAD (coronary artery disease) 4/1/2019     Cholelithiasis      Conductive hearing loss 8/16/2017    Have a lump on my right side of my face.  Had wax discharge     Depressive disorder 1986    Suffer effects throughout life     Gastroesophageal reflux disease 12/1/2014    Being treated with Prilosac     HCC (hepatocellular carcinoma) (H) 1/22/2019     Hepatitis 2014    Diagnosed with schrosis ESTRADA in 2014.  Suffer from hepatatie     HTN (hypertension)      Hyperlipidemia      Liver cirrhosis secondary to ESTRADA (H)      Portal vein thrombosis 4/11/2019     Type II diabetes mellitus (H)     Insulin adminstered BID daily.      Patient Active Problem List   Diagnosis     Hepatic cirrhosis (H)     Type II diabetes mellitus (H)     Bipolar affective disorder in remission (H)     Esophageal varices (H)     Nonalcoholic steatohepatitis (ESTRADA)     Brow ptosis     Paralytic lagophthalmos of right upper eyelid     Erectile dysfunction due to diseases classified elsewhere     HCC (hepatocellular carcinoma) (H)     Acute pain of both shoulders     Equivocal stress echocardiogram     Type 2 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, unspecified whether long term insulin use (H)     Abnormal findings diagnostic imaging of heart and coronary circulation     Status post coronary angiogram     CAD (coronary artery disease)     Portal vein thrombosis     Past Surgical History:   Procedure Laterality Date     COLONOSCOPY      2015     CV HEART CATHETERIZATION WITH  POSSIBLE INTERVENTION N/A 2/26/2019    Procedure: CORS;  Surgeon: Jagdish Hoyt MD;  Location:  HEART CARDIAC CATH LAB     ESOPHAGOSCOPY, GASTROSCOPY, DUODENOSCOPY (EGD), COMBINED N/A 11/17/2016    Procedure: COMBINED ESOPHAGOSCOPY, GASTROSCOPY, DUODENOSCOPY (EGD);  Surgeon: Santi Rosas MD;  [facility]     ESOPHAGOSCOPY, GASTROSCOPY, DUODENOSCOPY (EGD), COMBINED N/A 11/17/2017    Procedure: COMBINED ESOPHAGOSCOPY, GASTROSCOPY, DUODENOSCOPY (EGD);  EGD;  Surgeon: Santi Rosas MD;  Location:  GI     ESOPHAGOSCOPY, GASTROSCOPY, DUODENOSCOPY (EGD), COMBINED N/A 12/28/2018    Procedure: EGD;  Surgeon: Santi Rosas MD;  Location:  OR     HEAD & NECK SURGERY      12/2017 at [facility].      IMPLANT GOLD WEIGHT EYELID Right 11/16/2017    Procedure: IMPLANT WEIGHT EYELID;  Right Upper Eyelid Weight, right tarsal strip lower eyelid;  Surgeon: Milana Malave MD;  Location:  OR     IR CHEMO EMBOLIZATION  1/22/2019     KNEE SURGERY Left      ORTHOPEDIC SURGERY       PAROTIDECTOMY, RADICAL NECK DISSECTION Right 11/2/2017    Procedure: PAROTIDECTOMY, RADICAL NECK DISSECTION;  Right Superfacial Parotidectomy , Facial nerve repair. with [facility] facial nerve monitor.;  Surgeon: Asiya Morgan MD;  Location: [facility] OR     PICC INSERTION Left 11/06/2017    4fr SL BioFlo PICC, 44cm in the L basilic vein w/ tip in the low SVC     VASCULAR SURGERY       Social History     Socioeconomic History     Marital status:      Spouse name: Not on file     Number of children: Not on file     Years of education: Not on file     Highest education level: Not on file   Occupational History     Not on file   Social Needs     Financial resource strain: Not on file     Food insecurity:     Worry: Not on file     Inability: Not on file     Transportation needs:     Medical: Not on file     Non-medical: Not on file   Tobacco Use     Smoking status: Never Smoker     Smokeless tobacco: Former User      Types: Chew     Tobacco comment: 1 tin per week   Substance and Sexual Activity     Alcohol use: No     Alcohol/week: 0.0 oz     Comment: quit 1996     Drug use: No     Sexual activity: Not Currently     Partners: Female     Birth control/protection: Condom   Lifestyle     Physical activity:     Days per week: Not on file     Minutes per session: Not on file     Stress: Not on file   Relationships     Social connections:     Talks on phone: Not on file     Gets together: Not on file     Attends Christian service: Not on file     Active member of club or organization: Not on file     Attends meetings of clubs or organizations: Not on file     Relationship status: Not on file     Intimate partner violence:     Fear of current or ex partner: Not on file     Emotionally abused: Not on file     Physically abused: Not on file     Forced sexual activity: Not on file   Other Topics Concern     Parent/sibling w/ CABG, MI or angioplasty before 65F 55M? Yes   Social History Narrative     Not on file     Family History   Problem Relation Age of Onset     Prostate Cancer Maternal Grandfather      Substance Abuse Maternal Grandfather         Alcohol     Colon Cancer Father 60     Pancreatic Cancer Father 60     Prostate Cancer Father      Colorectal Cancer Father      Macular Degeneration Father      Cancer Father      Glaucoma Father      Skin Cancer Father      Colorectal Cancer Maternal Grandmother      Cancer Maternal Grandmother      Substance Abuse Maternal Grandmother         Alcohol     Colorectal Cancer Paternal Grandmother      Cancer Mother      Diabetes Mother          3/2016     Cerebrovascular Disease Mother         Passed away in Feb of this year, 80 years old.     Thyroid Disease Mother      Depression Mother      Asthma Sister         Had since birth     Thyroid Disease Sister      Depression Sister      Liver Disease No family hx of      Melanoma No family hx of      Lab Results   Component Value  Date    A1C 6.6 08/08/2018    A1C 6.5 06/09/2017    A1C 7.8 10/25/2016     Lab Results   Component Value Date    WBC 4.2 04/01/2019     Lab Results   Component Value Date    RBC 3.91 04/01/2019     Lab Results   Component Value Date    HGB 13.7 04/01/2019     Lab Results   Component Value Date    HCT 42.4 04/01/2019     No components found for: MCT  Lab Results   Component Value Date     04/01/2019     Lab Results   Component Value Date    MCH 35.0 04/01/2019     Lab Results   Component Value Date    MCHC 32.3 04/01/2019     Lab Results   Component Value Date    RDW 14.5 04/01/2019     Lab Results   Component Value Date    PLT 41 04/01/2019     Last Comprehensive Metabolic Panel:  Sodium   Date Value Ref Range Status   05/01/2019 139 133 - 144 mmol/L Final     Potassium   Date Value Ref Range Status   05/01/2019 4.8 3.4 - 5.3 mmol/L Final     Chloride   Date Value Ref Range Status   05/01/2019 108 94 - 109 mmol/L Final     Carbon Dioxide   Date Value Ref Range Status   05/01/2019 25 20 - 32 mmol/L Final     Anion Gap   Date Value Ref Range Status   05/01/2019 5 3 - 14 mmol/L Final     Glucose   Date Value Ref Range Status   05/01/2019 116 (H) 70 - 99 mg/dL Final     Urea Nitrogen   Date Value Ref Range Status   05/01/2019 30 7 - 30 mg/dL Final     Creatinine   Date Value Ref Range Status   05/01/2019 1.65 (H) 0.66 - 1.25 mg/dL Final     GFR Estimate   Date Value Ref Range Status   05/01/2019 46 (L) >60 mL/min/[1.73_m2] Final     Comment:     Non  GFR Calc  Starting 12/18/2018, serum creatinine based estimated GFR (eGFR) will be   calculated using the Chronic Kidney Disease Epidemiology Collaboration   (CKD-EPI) equation.       Calcium   Date Value Ref Range Status   05/01/2019 10.2 (H) 8.5 - 10.1 mg/dL Final     Lab Results   Component Value Date    AST 38 04/12/2019     Lab Results   Component Value Date    ALT 34 04/12/2019     No results found for: BILICONJ   Lab Results   Component Value  Date    BILITOTAL 1.3 04/12/2019     Lab Results   Component Value Date    ALBUMIN 3.0 04/12/2019     Lab Results   Component Value Date    PROTTOTAL 7.4 04/12/2019      Lab Results   Component Value Date    ALKPHOS 120 04/12/2019     SUBJECTIVE FINDINGS:  A 54-year-old male who returns to clinic for diabetic foot check and toenail care.  He relates that he is doing well.  He relates he gets numbness and tingling in toes and his feet feel warm at night, no ulcers or sores since we seen him last.  Relates he tripped and got an abrasion on his right foot a couple of months ago that seems to be healing.     OBJECTIVE FINDINGS:  DP and PT are 2/4 bilaterally.  He has dorsally contracted digits 2 through 5 bilaterally.  He has dorsomedial first MPJ prominence and laterally deviated hallux bilaterally.  He has minimal hyperkeratotic tissue build up plantar 2-4 mpjs bilaterally.   He has a yuniel eschar dorsal right 2-3 mpj.  There is no erythema, drainage, no odor, no calor bilaterally.  There are no gross tendon voids bilaterally.  He has incurvated nails 1 through 5 bilaterally.        ASSESSMENT AND PLAN:  Onychauxis bilaterally.  Diabetes with peripheral neuropathy.  Abrasion right dorsal foot.  Diagnosis and treatment discussed with him.  Betadine dispensed and use discussed with him for right foot abrasion.  He will return to clinic and see me in about 3 months.  All the nails were reduced bilaterally upon consent.     Again, thank you for allowing me to participate in the care of your patient.      Sincerely,  Lamin Gonzalez DPM

## 2019-05-26 DIAGNOSIS — Z79.4 TYPE 2 DIABETES MELLITUS WITHOUT COMPLICATION, WITH LONG-TERM CURRENT USE OF INSULIN (H): ICD-10-CM

## 2019-05-26 DIAGNOSIS — E11.9 TYPE 2 DIABETES MELLITUS WITHOUT COMPLICATION, WITH LONG-TERM CURRENT USE OF INSULIN (H): ICD-10-CM

## 2019-05-28 DIAGNOSIS — B35.3 TINEA PEDIS OF BOTH FEET: ICD-10-CM

## 2019-06-02 DIAGNOSIS — Z79.4 TYPE 2 DIABETES MELLITUS WITHOUT COMPLICATION, WITH LONG-TERM CURRENT USE OF INSULIN (H): ICD-10-CM

## 2019-06-02 DIAGNOSIS — E11.9 TYPE 2 DIABETES MELLITUS WITHOUT COMPLICATION, WITH LONG-TERM CURRENT USE OF INSULIN (H): ICD-10-CM

## 2019-06-04 RX ORDER — ECONAZOLE NITRATE 10 MG/G
CREAM TOPICAL DAILY
Qty: 85 G | Refills: 0 | Status: SHIPPED | OUTPATIENT
Start: 2019-06-04 | End: 2019-06-28

## 2019-06-06 DIAGNOSIS — G47.00 INSOMNIA, UNSPECIFIED TYPE: ICD-10-CM

## 2019-06-07 RX ORDER — TRAZODONE HYDROCHLORIDE 50 MG/1
50 TABLET, FILM COATED ORAL AT BEDTIME
Qty: 90 TABLET | Refills: 1 | Status: SHIPPED | OUTPATIENT
Start: 2019-06-07 | End: 2019-11-23

## 2019-06-12 DIAGNOSIS — N40.1 BENIGN PROSTATIC HYPERPLASIA WITH LOWER URINARY TRACT SYMPTOMS: ICD-10-CM

## 2019-06-12 RX ORDER — TAMSULOSIN HYDROCHLORIDE 0.4 MG/1
CAPSULE ORAL
Qty: 90 CAPSULE | Refills: 0 | Status: SHIPPED | OUTPATIENT
Start: 2019-06-12 | End: 2019-10-01

## 2019-06-12 NOTE — TELEPHONE ENCOUNTER
tamsulosin (FLOMAX) 0.4 MG capsule      Last Written Prescription Date:  6/20/18  Last Fill Quantity: 90,   # refills: 3  Last Office Visit : 3/18/19  Future Office visit: 9/19/19    90 day to pharmacy.

## 2019-06-13 DIAGNOSIS — K21.9 GASTROESOPHAGEAL REFLUX DISEASE, ESOPHAGITIS PRESENCE NOT SPECIFIED: ICD-10-CM

## 2019-06-14 RX ORDER — OMEPRAZOLE 40 MG/1
CAPSULE, DELAYED RELEASE ORAL
Qty: 90 CAPSULE | Refills: 0 | Status: SHIPPED | OUTPATIENT
Start: 2019-06-14 | End: 2019-09-10

## 2019-06-14 NOTE — TELEPHONE ENCOUNTER
omeprazole (PRILOSEC) 40 MG DR capsule      Last Written Prescription Date:  3/18/19  Last Fill Quantity: 90,   # refills: 0  Last Office Visit : 3/18/19  Future Office visit:  9/19/19    90 day to pharmacy.

## 2019-06-27 ENCOUNTER — OFFICE VISIT (OUTPATIENT)
Dept: OPHTHALMOLOGY | Facility: CLINIC | Age: 54
End: 2019-06-27
Attending: OPHTHALMOLOGY
Payer: MEDICARE

## 2019-06-27 ENCOUNTER — TELEPHONE (OUTPATIENT)
Dept: GASTROENTEROLOGY | Facility: CLINIC | Age: 54
End: 2019-06-27

## 2019-06-27 DIAGNOSIS — K74.60 LIVER CIRRHOSIS SECONDARY TO NASH (H): Primary | ICD-10-CM

## 2019-06-27 DIAGNOSIS — E11.3213 TYPE 2 DIABETES MELLITUS WITH MILD NONPROLIFERATIVE RETINOPATHY OF BOTH EYES AND MACULAR EDEMA, UNSPECIFIED WHETHER LONG TERM INSULIN USE (H): Primary | ICD-10-CM

## 2019-06-27 DIAGNOSIS — K75.81 LIVER CIRRHOSIS SECONDARY TO NASH (H): Primary | ICD-10-CM

## 2019-06-27 PROCEDURE — G0463 HOSPITAL OUTPT CLINIC VISIT: HCPCS | Mod: ZF,25

## 2019-06-27 PROCEDURE — 40000269 ZZH STATISTIC NO CHARGE FACILITY FEE: Mod: ZF

## 2019-06-27 PROCEDURE — 25000128 H RX IP 250 OP 636: Mod: ZF | Performed by: OPHTHALMOLOGY

## 2019-06-27 PROCEDURE — 67028 INJECTION EYE DRUG: CPT | Mod: 50,ZF | Performed by: OPHTHALMOLOGY

## 2019-06-27 RX ADMIN — AFLIBERCEPT 2 MG: 40 INJECTION, SOLUTION INTRAVITREAL at 13:03

## 2019-06-27 ASSESSMENT — VISUAL ACUITY
OS_CC: 20/30
OD_CC: 20/50
METHOD: SNELLEN - LINEAR
CORRECTION_TYPE: GLASSES
OD_PH_CC: 20/40

## 2019-06-27 ASSESSMENT — SLIT LAMP EXAM - LIDS
COMMENTS: SMALL PAPILLOMA ALONG LL MARGIN
COMMENTS: NORMAL

## 2019-06-27 ASSESSMENT — EXTERNAL EXAM - RIGHT EYE: OD_EXAM: NORMAL

## 2019-06-27 ASSESSMENT — CUP TO DISC RATIO
OS_RATIO: 0.3
OD_RATIO: 0.25

## 2019-06-27 ASSESSMENT — TONOMETRY
IOP_METHOD: TONOPEN
OS_IOP_MMHG: 15
OD_IOP_MMHG: 17

## 2019-06-27 ASSESSMENT — EXTERNAL EXAM - LEFT EYE: OS_EXAM: NORMAL

## 2019-06-27 ASSESSMENT — REFRACTION_WEARINGRX
OS_AXIS: 040
OS_ADD: +2.00
OD_ADD: +2.00
OD_SPHERE: -7.25
OD_CYLINDER: +0.75
OD_AXIS: 132
OS_CYLINDER: +0.75
OS_SPHERE: -7.25
SPECS_TYPE: PAL

## 2019-06-27 ASSESSMENT — CONF VISUAL FIELD
METHOD: COUNTING FINGERS
OD_NORMAL: 1
OS_NORMAL: 1

## 2019-06-27 NOTE — PROGRESS NOTES
CC:  Follow up dme left eye     HPI: Frandy Workman is a  54 year old year-old patient with history of Diabetes mellitus for 24 ys . Patient on insulin.  HBA1c 6.6 08/08/18. Patient was evaluated by dr. Amezquita and sent to the retina clinic for management of Diabetic macular edema     Interval History: VA Stable no new flashes and floaters. Last HbA1C 6.1 on 2/11/19.    Retinal Imaging:  OCT 5-23-19  RE: resolved Diabetic macular edema  LE: resolved Diabetic macular edema    fluorescein angiography transits left eye 3-28-19  Right eye: diffuse microaneurysms, late macula leakage; moderate peripheral capillary non perfusion; significant vessel leakage in late phase, no NVD/NVE  Left eye: diffuse microaneurysms, late macula leakage; moderate peripheral capillary non perfusion;  significant vessel leakage in late phase, no NVD/NVE    Optos consistent with clinical exam    Assessment & Plan:  1.  Severe nonproliferative diabetic retinopathy of both eyes    - Blood pressure (<120/80) and blood glucose (HbA1c <7.0) control discussed with patient. Patient advised  that failure to adequately control each may lead to vision loss. The patient expressed understanding.    2. Diabetic macular edema left eye    - status post avastin x 4. Switch to Eylea last eye examination with improvement of Diabetic macular edema      3. Diabetic macular edema right eye   - status post avastin. Switch to Eylea last eye examination with  improvement of Diabetic macular edema           3. Myopia, bilateral  Prescription given last yuval     4. Senile nuclear sclerosis, bilateral  Comment: Not visually significant OU  Plan:  No treatment indicated. Monitor annually.    Plan: follow up 6 weeks for inj only both eyes and with Optical Coherence Tomography   Could consider T&E     ~~~~~~~~~~~~~~~~~~~~~~~~~~~~~~~~~~   Complete documentation of historical and exam elements from today's encounter can be found in the full encounter summary report (not  reduplicated in this progress note).  I personally obtained the chief complaint(s) and history of present illness.  I confirmed and edited as necessary the review of systems, past medical/surgical history, family history, social history, and examination findings as documented by others; and I examined the patient myself.  I personally reviewed the relevant tests, images, and reports as documented above.  I personally reviewed the ophthalmic test(s) associated with this encounter, agree with the interpretation(s) as documented by the resident/fellow, and have edited the corresponding report(s) as necessary.   I formulated and edited as necessary the assessment and plan and discussed the findings and management plan with the patient and family and No resident or fellow assisted with the procedures performed.  I performed the procedures myself.    Enriqueta Martin MD   of Ophthalmology.  Retina Service   Department of Ophthalmology and Visual Neurosciences   [facility]  Phone: [phone]   Fax: [phone]

## 2019-06-27 NOTE — TELEPHONE ENCOUNTER
Received refill request for both spironolactone and lasix.  Called patient to confirm he was holding those diuretics due to elevated creatinine and to remind him to check a BMP.  Patient was supposed to check BMP two weeks after stopping diuretics but never did.  Patient states he feels fine off of diuretics.  No extra fluid noted.  Patient has appointment 7/8/19 in Nephrology and would like to get labs then.  If needed, will go sooner.

## 2019-06-27 NOTE — NURSING NOTE
Chief Complaints and History of Present Illnesses   Patient presents with     Diabetic Retinopathy Evaluation     Chief Complaint(s) and History of Present Illness(es)     Diabetic Retinopathy Evaluation     Laterality: both eyes    Onset: gradual    Onset: months ago    Quality: States va is the same since last visit      Frequency: constantly    Associated symptoms: floaters (with the last injection) (+itchy)    Pain scale: 0/10              Comments     Severe nonproliferative diabetic retinopathy   BS 81  Lab Results       Component                Value               Date                       A1C                      6.6                 08/08/2018                 A1C                      6.5                 06/09/2017                 A1C                      7.8                 10/25/2016            Shawanda Noriega COT 12:03 PM June 27, 2019

## 2019-06-28 DIAGNOSIS — B35.3 TINEA PEDIS OF BOTH FEET: ICD-10-CM

## 2019-06-28 RX ORDER — ECONAZOLE NITRATE 10 MG/G
CREAM TOPICAL DAILY
Qty: 85 G | Refills: 0 | Status: SHIPPED | OUTPATIENT
Start: 2019-06-28 | End: 2019-07-28

## 2019-07-03 ENCOUNTER — TELEPHONE (OUTPATIENT)
Dept: NEPHROLOGY | Facility: CLINIC | Age: 54
End: 2019-07-03

## 2019-07-03 NOTE — TELEPHONE ENCOUNTER
Patient contacted and reminded of upcoming appointment.  Patient confirmed they will be attending.  Patient instructed to bring updated medications list to appointment.      Ayesha Schneider CMA    7/3/2019 5:22 PM

## 2019-07-05 DIAGNOSIS — E55.9 VITAMIN D DEFICIENCY: ICD-10-CM

## 2019-07-05 DIAGNOSIS — R18.8 CIRRHOSIS OF LIVER WITH ASCITES, UNSPECIFIED HEPATIC CIRRHOSIS TYPE (H): Primary | ICD-10-CM

## 2019-07-05 DIAGNOSIS — Z76.82 AWAITING LIVER TRANSPLANT: ICD-10-CM

## 2019-07-05 DIAGNOSIS — K74.60 CIRRHOSIS OF LIVER WITH ASCITES, UNSPECIFIED HEPATIC CIRRHOSIS TYPE (H): Primary | ICD-10-CM

## 2019-07-05 DIAGNOSIS — C22.0 HCC (HEPATOCELLULAR CARCINOMA) (H): ICD-10-CM

## 2019-07-05 NOTE — TELEPHONE ENCOUNTER
RECORDS RECEIVED FROM: (Internal)  worsening kidney function, referral from Dr. Banuelos in hepatology   DATE RECEIVED: 07.08.2019   NOTES STATUS DETAILS   OFFICE NOTE from referring provider Internal 05.03.2019   OFFICE NOTE from other specialist  N/A    *Only VASCULITIS or LUPUS gather office notes for the following N/A    *PULMONARY   N/A    *ENT N/A    *DERMATOLOGY N/A    *RHEUMATOLOGY N/A    DISCHARGE SUMMARY from hospital N/A    DISCHARGE REPORT from the ER N/A    MEDICATION LIST Internal    IMAGING  (NEED IMAGES AND REPORTS)     KIDNEY CT SCAN N/A    KIDNEY ULTRASOUND Internal 02.04.2019   LABS     CBC Internal 06.27.2019   CMP Internal 06.27.2019   BMP Internal 06.27.2019   UA Internal 06.27.2019   URINE PROTEIN Internal 02.04.2019   RENAL PANEL N/A    BIOPSY     KIDNEY BIOPSY  N/A

## 2019-07-08 ENCOUNTER — OFFICE VISIT (OUTPATIENT)
Dept: NEPHROLOGY | Facility: CLINIC | Age: 54
End: 2019-07-08
Attending: INTERNAL MEDICINE
Payer: MEDICARE

## 2019-07-08 ENCOUNTER — PRE VISIT (OUTPATIENT)
Dept: NEPHROLOGY | Facility: CLINIC | Age: 54
End: 2019-07-08

## 2019-07-08 VITALS
SYSTOLIC BLOOD PRESSURE: 95 MMHG | WEIGHT: 181.4 LBS | OXYGEN SATURATION: 98 % | HEART RATE: 62 BPM | BODY MASS INDEX: 26.78 KG/M2 | DIASTOLIC BLOOD PRESSURE: 57 MMHG | TEMPERATURE: 97.6 F

## 2019-07-08 DIAGNOSIS — K74.60 CIRRHOSIS OF LIVER WITH ASCITES, UNSPECIFIED HEPATIC CIRRHOSIS TYPE (H): ICD-10-CM

## 2019-07-08 DIAGNOSIS — K74.60 LIVER CIRRHOSIS SECONDARY TO NASH (H): ICD-10-CM

## 2019-07-08 DIAGNOSIS — K75.81 LIVER CIRRHOSIS SECONDARY TO NASH (H): ICD-10-CM

## 2019-07-08 DIAGNOSIS — C22.0 HCC (HEPATOCELLULAR CARCINOMA) (H): ICD-10-CM

## 2019-07-08 DIAGNOSIS — N17.9 ACUTE KIDNEY INJURY SUPERIMPOSED ON CKD (H): Primary | ICD-10-CM

## 2019-07-08 DIAGNOSIS — N18.9 ACUTE KIDNEY INJURY SUPERIMPOSED ON CKD (H): Primary | ICD-10-CM

## 2019-07-08 DIAGNOSIS — R18.8 CIRRHOSIS OF LIVER WITH ASCITES, UNSPECIFIED HEPATIC CIRRHOSIS TYPE (H): ICD-10-CM

## 2019-07-08 DIAGNOSIS — E55.9 VITAMIN D DEFICIENCY: ICD-10-CM

## 2019-07-08 DIAGNOSIS — Z76.82 AWAITING LIVER TRANSPLANT: ICD-10-CM

## 2019-07-08 LAB
AFP SERPL-MCNC: 4.3 UG/L (ref 0–8)
ALBUMIN SERPL-MCNC: 3 G/DL (ref 3.4–5)
ALBUMIN UR-MCNC: NEGATIVE MG/DL
ALP SERPL-CCNC: 138 U/L (ref 40–150)
ALT SERPL W P-5'-P-CCNC: 50 U/L (ref 0–70)
ANION GAP SERPL CALCULATED.3IONS-SCNC: 8 MMOL/L (ref 3–14)
APPEARANCE UR: CLEAR
AST SERPL W P-5'-P-CCNC: 47 U/L (ref 0–45)
BACTERIA #/AREA URNS HPF: ABNORMAL /HPF
BILIRUB DIRECT SERPL-MCNC: 0.3 MG/DL (ref 0–0.2)
BILIRUB SERPL-MCNC: 1.1 MG/DL (ref 0.2–1.3)
BILIRUB UR QL STRIP: NEGATIVE
BUN SERPL-MCNC: 27 MG/DL (ref 7–30)
CALCIUM SERPL-MCNC: 8.9 MG/DL (ref 8.5–10.1)
CHLORIDE SERPL-SCNC: 110 MMOL/L (ref 94–109)
CO2 SERPL-SCNC: 24 MMOL/L (ref 20–32)
COLOR UR AUTO: YELLOW
CREAT SERPL-MCNC: 1.25 MG/DL (ref 0.66–1.25)
CREAT UR-MCNC: 63 MG/DL
ERYTHROCYTE [DISTWIDTH] IN BLOOD BY AUTOMATED COUNT: 14.6 % (ref 10–15)
GFR SERPL CREATININE-BSD FRML MDRD: 64 ML/MIN/{1.73_M2}
GLUCOSE SERPL-MCNC: 131 MG/DL (ref 70–99)
GLUCOSE UR STRIP-MCNC: >499 MG/DL
HCT VFR BLD AUTO: 35.7 % (ref 40–53)
HGB BLD-MCNC: 12.1 G/DL (ref 13.3–17.7)
HGB UR QL STRIP: NEGATIVE
INR PPP: 1.27 (ref 0.86–1.14)
KETONES UR STRIP-MCNC: NEGATIVE MG/DL
LEUKOCYTE ESTERASE UR QL STRIP: NEGATIVE
MCH RBC QN AUTO: 36.8 PG (ref 26.5–33)
MCHC RBC AUTO-ENTMCNC: 33.9 G/DL (ref 31.5–36.5)
MCV RBC AUTO: 109 FL (ref 78–100)
MICROALBUMIN UR-MCNC: 6 MG/L
MICROALBUMIN/CREAT UR: 9.21 MG/G CR (ref 0–17)
NITRATE UR QL: NEGATIVE
OSMOLALITY UR: 544 MMOL/KG (ref 100–1200)
PH UR STRIP: 5 PH (ref 5–7)
PHOSPHATE SERPL-MCNC: 3.1 MG/DL (ref 2.5–4.5)
PLATELET # BLD AUTO: 36 10E9/L (ref 150–450)
POTASSIUM SERPL-SCNC: 3.6 MMOL/L (ref 3.4–5.3)
PROT SERPL-MCNC: 7.2 G/DL (ref 6.8–8.8)
PROT UR-MCNC: 0.07 G/L
PROT/CREAT 24H UR: 0.11 G/G CR (ref 0–0.2)
PTH-INTACT SERPL-MCNC: 54 PG/ML (ref 18–80)
RBC # BLD AUTO: 3.29 10E12/L (ref 4.4–5.9)
RBC #/AREA URNS AUTO: <1 /HPF (ref 0–2)
SODIUM SERPL-SCNC: 141 MMOL/L (ref 133–144)
SODIUM UR-SCNC: 27 MMOL/L
SOURCE: ABNORMAL
SP GR UR STRIP: 1.02 (ref 1–1.03)
UROBILINOGEN UR STRIP-MCNC: 0 MG/DL (ref 0–2)
WBC # BLD AUTO: 3.1 10E9/L (ref 4–11)
WBC #/AREA URNS AUTO: 3 /HPF (ref 0–5)

## 2019-07-08 PROCEDURE — 84460 ALANINE AMINO (ALT) (SGPT): CPT | Performed by: INTERNAL MEDICINE

## 2019-07-08 PROCEDURE — 82105 ALPHA-FETOPROTEIN SERUM: CPT | Performed by: INTERNAL MEDICINE

## 2019-07-08 PROCEDURE — 83935 ASSAY OF URINE OSMOLALITY: CPT | Performed by: INTERNAL MEDICINE

## 2019-07-08 PROCEDURE — 82248 BILIRUBIN DIRECT: CPT | Performed by: INTERNAL MEDICINE

## 2019-07-08 PROCEDURE — 82247 BILIRUBIN TOTAL: CPT | Performed by: INTERNAL MEDICINE

## 2019-07-08 PROCEDURE — G0463 HOSPITAL OUTPT CLINIC VISIT: HCPCS | Mod: ZF

## 2019-07-08 PROCEDURE — 80069 RENAL FUNCTION PANEL: CPT | Performed by: INTERNAL MEDICINE

## 2019-07-08 PROCEDURE — 36415 COLL VENOUS BLD VENIPUNCTURE: CPT | Performed by: INTERNAL MEDICINE

## 2019-07-08 PROCEDURE — 83970 ASSAY OF PARATHORMONE: CPT | Performed by: INTERNAL MEDICINE

## 2019-07-08 PROCEDURE — 82043 UR ALBUMIN QUANTITATIVE: CPT | Performed by: INTERNAL MEDICINE

## 2019-07-08 PROCEDURE — 84155 ASSAY OF PROTEIN SERUM: CPT | Performed by: INTERNAL MEDICINE

## 2019-07-08 PROCEDURE — 84450 TRANSFERASE (AST) (SGOT): CPT | Performed by: INTERNAL MEDICINE

## 2019-07-08 PROCEDURE — 85027 COMPLETE CBC AUTOMATED: CPT | Performed by: INTERNAL MEDICINE

## 2019-07-08 PROCEDURE — 84075 ASSAY ALKALINE PHOSPHATASE: CPT | Performed by: INTERNAL MEDICINE

## 2019-07-08 PROCEDURE — 84300 ASSAY OF URINE SODIUM: CPT | Performed by: INTERNAL MEDICINE

## 2019-07-08 PROCEDURE — 85610 PROTHROMBIN TIME: CPT | Performed by: INTERNAL MEDICINE

## 2019-07-08 PROCEDURE — 84156 ASSAY OF PROTEIN URINE: CPT | Performed by: INTERNAL MEDICINE

## 2019-07-08 PROCEDURE — 82306 VITAMIN D 25 HYDROXY: CPT | Performed by: INTERNAL MEDICINE

## 2019-07-08 PROCEDURE — 81001 URINALYSIS AUTO W/SCOPE: CPT | Performed by: INTERNAL MEDICINE

## 2019-07-08 ASSESSMENT — PAIN SCALES - GENERAL: PAINLEVEL: MODERATE PAIN (5)

## 2019-07-08 NOTE — NURSING NOTE
"Chief Complaint   Patient presents with     Consult     Referred for elevated labs     Vital signs:  Temp: 97.6  F (36.4  C)   BP: 95/57 Pulse: 62     SpO2: 98 %       Weight: 82.3 kg (181 lb 6.4 oz)  Estimated body mass index is 26.78 kg/m  as calculated from the following:    Height as of 4/22/19: 1.753 m (5' 9.02\").    Weight as of this encounter: 82.3 kg (181 lb 6.4 oz).        Maggie Lewis CMA    "

## 2019-07-08 NOTE — PROGRESS NOTES
Nephrology Clinic    Eloy Rao MD  2019     Name: Frandy Workman  MRN: [id number]  Age: 54 year old  : 1965  Referring provider: Santi Dotson     Assessment and Plan:  Frandy Workman is a 54 year old man with ESTRADA cirrhosis (complicated by past ascites and hepatic encephalopathy on lactulose and rifaximin), hepatocellular carcinoma (s/p TACE and microwave ablation 2019), long standing DM 2 (complicated by nonproliferative diabetic retinopathy, macular edema and peripheral neuropathy), bipolar disorder (previously on lithium for 15 years), HTN, hyperlipidemia and CAD (by angiography not requiring PCI) who presents for evaluation of recent JERONIMO on CKD.     1. JERONIMO on CKD, stage 2: Baseline Cr ~1.1 mg/dL with eGFR of 75 ml/min. Recent JERONIMO event likely pre-renal and related to initiation of diuretics.  Fortunately, his renal function is back to baseline and he remains euvolemic after cessation of diuretic therapy.  He does appear to have CKD though mild.  I suspect he likely has some degree of diabetic changes though not albuminuric further complicated by chronic changes from past lithium use (for 15 years).  These underlying changes likely made him susceptible to pre-renal rise in creatinine from diuretics.    -agree with avoiding diuretics for now  -no pressing need to start RAAS blockade with ACEi/ARB therapy  -he otherwise has no major electrolyte, CKD-MBD or anemia issues that need to be addressed   -would favor higher blood pressures to avoid renal hypoperfusion (BP 95/57 today)  -RTC in 6 months    2. HTN/Volume status: Hypotensive (BP 95/57) in clinic today though with no overt symptoms.  Euvolemic on exam despite discontinuation of diuretics.  Currently on carvedilol (12.5 mg BID) likely for esophogeal varices.      -would favor high blood pressures to assure adequate renal perfusion in the setting of cirrhosis.  Having said that, he seems to be tolerating it  relatively well though I do have a low threshold to decreasing this medication provided that his risk of bleeding esophogeal varices does not increase.        Follow-up: Return in about 6 months (around 1/8/2020).     Reason For Visit: CKD    HPI:   Frandy Workman is a 54 year old man who present for evaluation CKD.  His past medical history is remarkable for ESTRADA cirrhosis (complicated by past ascites and hepatic encephalopathy on lactulose and rifaximin), hepatocellular carcinoma (s/p TACE and microwave ablation 01/2019) long standing DM 2 (complicated by nonproliferative diabetic retinopathy, macular edema and peripheral neuropathy), bipolar disorder (previously on lithium for 15 years), HTN, hyperlipidemia and CAD by angiography not requiring PCI.     In terms of CKD, he appears to have a baseline of ~1.1 mg/dL over the past year.  However, he was noted to have a rise in creatinine to ~1.7 mg/dL in May, 2019 after initiation of diuretic therapy.  He has since discontinued diuretics and his Cr is nearly back to baseline.  He denies excessive use of NSAIDs.  He had no history of nephrolithiasis or frequent UTIs.  He has no significant family history of CKD.  He       Today, the patient reports that he is doing well. He does complain of neuropathic pain in his feet at night. He appears to be on carvedilol for esophogeal varices. He reports occasional dizziness upon standing but denies orthostatic symptoms today.  He reports being on lithium for 15 years, which may have resulted in a 2 week coma in December 2014 and a small set back in February 2015. He denies dysuria, gross hematuria, flank pain.      Review of Systems:   Pertinent items are noted in HPI or as below, remainder of complete ROS is negative.      Active Medications:     Current Outpatient Medications:      ACCU-CHEK EDINSON PLUS test strip, USE TO TEST BLOOD SUGARS FOUR TIMES DAILY OR AS DIRECTED, Disp: 150 strip, Rfl: 11     alpha-lipoic acid 600 MG  capsule, Take 1 capsule (600 mg) by mouth daily, Disp: 90 capsule, Rfl: 3     Artificial Tear Solution (SM ARTIFICIAL TEARS) SOLN, Place 1 drop into the right eye every hour as needed Apply at least 4 times daily and as needed for dry eye, Disp: 1 Bottle, Rfl: 3     aspirin (ASA) 81 MG EC tablet, Take 1 tablet (81 mg) by mouth daily, Disp: 90 tablet, Rfl: 3     BD VIKTORIA U/F 32G X 4 MM insulin pen needle, Use 5 per day, Disp: 300 each, Rfl: 3     blood glucose monitoring (ACCU-CHEK EDINSON PLUS) test strip, USE TO TEST BLOOD SUGARS FOUR TIMES DAILY OR AS DIRECTED, Disp: 400 strip, Rfl: 3     blood glucose monitoring (ACCU-CHEK EDINSON PLUS) test strip, USE TO TEST BLOOD SUGARS FOUR TIMES DAILY OR AS DIRECTED, Disp: 300 strip, Rfl: 3     blood glucose monitoring (ACCU-CHEK EDINSON PLUS) test strip, Use to test blood sugar 4 times daily, Disp: 400 each, Rfl: 3     blood glucose monitoring (ACCU-CHEK FASTCLIX) lancets, Use to test blood sugar 4 times daily or as directed.  1 box = 102 lancets, Disp: 408 each, Rfl: 3     carvedilol (COREG) 12.5 MG tablet, TAKE 1 TABLET(12.5 MG) BY MOUTH TWICE DAILY WITH MEALS, Disp: 180 tablet, Rfl: 3     Cholecalciferol (VITAMIN D-3 PO), Take 2,000 Units by mouth every morning , Disp: , Rfl:      cyanocobalamin (RA VITAMIN B-12 TR) 1000 MCG TBCR, Take 5,000 mcg by mouth every morning , Disp: , Rfl:      dapagliflozin (FARXIGA) 10 MG TABS tablet, Take 1 tablet (10 mg) by mouth daily, Disp: 90 tablet, Rfl: 3     diclofenac (VOLTAREN) 1 % topical gel, Place 2 g onto the skin 4 times daily, Disp: 100 g, Rfl: 3     econazole nitrate 1 % external cream, APPLY TOPICALLY DAILY TO FEET AND TOENAILS, Disp: 85 g, Rfl: 0     ferrous sulfate (FEROSUL) 325 (65 Fe) MG tablet, Take 325 mg by mouth 3 times daily (with meals) , Disp: , Rfl:      insulin degludec (TRESIBA) 200 UNIT/ML pen, Take 100 units daily. (Patient taking differently: Take 90 units daily.), Disp: 100 mL, Rfl: 3     insulin pen needle (BD  VIKTORIA U/F) 32G X 4 MM miscellaneous, USE 5 PER DAY, Disp: 300 each, Rfl: 2     lactulose (CHRONULAC) 10 GM/15ML solution, Take 45 mLs (30 g) by mouth 4 times daily, Disp: 5000 mL, Rfl: 11     metFORMIN (GLUCOPHAGE-XR) 500 MG 24 hr tablet, Take 1 tablet (500 mg) by mouth daily (with breakfast), Disp: 90 tablet, Rfl: 1     Multiple Vitamin (THERAVITE PO), Take 1 tablet by mouth every morning , Disp: , Rfl:      NOVOLOG FLEXPEN 100 UNIT/ML soln, INJECT 1 UNIT PER 4 GRAMS OF CARBS AT MEALS AND SNACKS. CORRECTION SCALE OF 1 UNITS PER 25 OVER [address] DOSE 75 UNITERS PER DAY, Disp: 30 mL, Rfl: 3     omeprazole (PRILOSEC) 40 MG DR capsule, TAKE 1 CAPSULE(40 MG) BY MOUTH DAILY, Disp: 90 capsule, Rfl: 0     potassium chloride ER (K-DUR/KLOR-CON M) 20 MEQ CR tablet, Take 1 tablet (20 mEq) by mouth daily, Disp: 90 tablet, Rfl: 3     rifaximin (XIFAXAN) 550 MG TABS tablet, TAKE 1 TABLET(550 MG) BY MOUTH TWICE DAILY, Disp: 60 tablet, Rfl: 3     rosuvastatin (CRESTOR) 20 MG tablet, Take 1 tablet (20 mg) by mouth daily, Disp: 60 tablet, Rfl: 2     tamsulosin (FLOMAX) 0.4 MG capsule, TAKE 1 CAPSULE(0.4 MG) BY MOUTH DAILY, Disp: 90 capsule, Rfl: 0     traZODone (DESYREL) 50 MG tablet, Take 1 tablet (50 mg) by mouth At Bedtime, Disp: 90 tablet, Rfl: 1     furosemide (LASIX) 20 MG tablet, Take 1 tablet (20 mg) by mouth daily, Disp: 90 tablet, Rfl: 0     spironolactone (ALDACTONE) 50 MG tablet, Take 1 tablet (50 mg) by mouth daily, Disp: 90 tablet, Rfl: 0    Current Facility-Administered Medications:      Aflibercept (EYLEA) injection 2 mg, 2 mg, Intravitreal, Q28 Days, AbramsEnriqueta damico MD, 2 mg at 06/27/19 1303     Aflibercept (EYLEA) injection 2 mg, 2 mg, Intravitreal, Q28 Days, Abrams, Enriqueta Selina, MD, 2 mg at 06/27/19 1303     bevacizumab (AVASTIN) intravitreal inj 1.25 mg, 1.25 mg, Intravitreal, Q28 Days, Enriqueta Martin MD, 1.25 mg at 03/28/19 1224     bevacizumab (AVASTIN) intravitreal inj 1.25 mg, 1.25 mg,  Intravitreal, Q28 Days, Enriqueta Martin MD, 1.25 mg at 03/28/19 1224     ranibizumab (LUCENTIS) injection syringe 0.5 mg, 0.5 mg, Intravitreal, Q28 Days, Enriqueta Martin MD     Allergies:   Codeine      Past Medical History:  Past Medical History:   Diagnosis Date     Anemia 2013    Low blood plates current is 37     Arthritis      BPH (benign prostatic hyperplasia)      CAD (coronary artery disease) 4/1/2019     Cholelithiasis      Conductive hearing loss 8/16/2017    Have a lump on my right side of my face.  Had wax discharge     Depressive disorder 1986    Suffer effects throughout life     Gastroesophageal reflux disease 12/1/2014    Being treated with Prilosac     HCC (hepatocellular carcinoma) (H) 1/22/2019     Hepatitis 2014    Diagnosed with schrosis ESTRADA in 2014.  Suffer from hepatatie     HTN (hypertension)      Hyperlipidemia      Liver cirrhosis secondary to ESTRADA (H)      Portal vein thrombosis 4/11/2019     Type II diabetes mellitus (H)     Insulin adminstered BID daily.      Patient Active Problem List   Diagnosis     Hepatic cirrhosis (H)     Type II diabetes mellitus (H)     Bipolar affective disorder in remission (H)     Esophageal varices (H)     Nonalcoholic steatohepatitis (ESTRADA)     Brow ptosis     Paralytic lagophthalmos of right upper eyelid     Erectile dysfunction due to diseases classified elsewhere     HCC (hepatocellular carcinoma) (H)     Acute pain of both shoulders     Equivocal stress echocardiogram     Type 2 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, unspecified whether long term insulin use (H)     Abnormal findings diagnostic imaging of heart and coronary circulation     Status post coronary angiogram     CAD (coronary artery disease)     Portal vein thrombosis     Past Surgical History:  Past Surgical History:   Procedure Laterality Date     COLONOSCOPY      2015     CV HEART CATHETERIZATION WITH POSSIBLE  INTERVENTION N/A 2/26/2019    Procedure: CORS;  Surgeon: Jagdish Hoyt MD;  Location:  HEART CARDIAC CATH LAB     ESOPHAGOSCOPY, GASTROSCOPY, DUODENOSCOPY (EGD), COMBINED N/A 11/17/2016    Procedure: COMBINED ESOPHAGOSCOPY, GASTROSCOPY, DUODENOSCOPY (EGD);  Surgeon: Santi Rosas MD;  Location:  GI     ESOPHAGOSCOPY, GASTROSCOPY, DUODENOSCOPY (EGD), COMBINED N/A 11/17/2017    Procedure: COMBINED ESOPHAGOSCOPY, GASTROSCOPY, DUODENOSCOPY (EGD);  EGD;  Surgeon: Santi Rosas MD;  Location:  GI     ESOPHAGOSCOPY, GASTROSCOPY, DUODENOSCOPY (EGD), COMBINED N/A 12/28/2018    Procedure: EGD;  Surgeon: Santi Rosas MD;  Location:  OR     HEAD & NECK SURGERY      12/2017 at [facility].      IMPLANT GOLD WEIGHT EYELID Right 11/16/2017    Procedure: IMPLANT WEIGHT EYELID;  Right Upper Eyelid Weight, right tarsal strip lower eyelid;  Surgeon: Milana Malave MD;  Location:  OR     IR CHEMO EMBOLIZATION  1/22/2019     KNEE SURGERY Left      ORTHOPEDIC SURGERY       PAROTIDECTOMY, RADICAL NECK DISSECTION Right 11/2/2017    Procedure: PAROTIDECTOMY, RADICAL NECK DISSECTION;  Right Superfacial Parotidectomy , Facial nerve repair. with [facility] facial nerve monitor.;  Surgeon: Asiya Morgan MD;  Location: [facility] OR     PICC INSERTION Left 11/06/2017    4fr SL BioFlo PICC, 44cm in the L basilic vein w/ tip in the low SVC     VASCULAR SURGERY       Family History:   - Cousin had to have kidneys replaced  Family History   Problem Relation Age of Onset     Prostate Cancer Maternal Grandfather      Substance Abuse Maternal Grandfather         Alcohol     Colon Cancer Father 60     Pancreatic Cancer Father 60     Prostate Cancer Father      Colorectal Cancer Father      Macular Degeneration Father      Cancer Father      Glaucoma Father      Skin Cancer Father      Colorectal Cancer Maternal Grandmother      Cancer Maternal Grandmother      Substance Abuse Maternal Grandmother         Alcohol      Colorectal Cancer Paternal Grandmother      Cancer Mother      Diabetes Mother          3/2016     Cerebrovascular Disease Mother         Passed away in Feb of this year, 80 years old.     Thyroid Disease Mother      Depression Mother      Asthma Sister         Had since birth     Thyroid Disease Sister      Depression Sister      Liver Disease No family hx of      Melanoma No family hx of       Social History:   Social History     Socioeconomic History     Marital status:      Spouse name: Not on file     Number of children: Not on file     Years of education: Not on file     Highest education level: Not on file   Occupational History     Not on file   Social Needs     Financial resource strain: Not on file     Food insecurity:     Worry: Not on file     Inability: Not on file     Transportation needs:     Medical: Not on file     Non-medical: Not on file   Tobacco Use     Smoking status: Never Smoker     Smokeless tobacco: Former User     Types: Chew     Tobacco comment: 1 tin per week   Substance and Sexual Activity     Alcohol use: No     Alcohol/week: 0.0 oz     Comment: quit 1996     Drug use: No     Sexual activity: Not Currently     Partners: Female     Birth control/protection: Condom   Lifestyle     Physical activity:     Days per week: Not on file     Minutes per session: Not on file     Stress: Not on file   Relationships     Social connections:     Talks on phone: Not on file     Gets together: Not on file     Attends Christian service: Not on file     Active member of club or organization: Not on file     Attends meetings of clubs or organizations: Not on file     Relationship status: Not on file     Intimate partner violence:     Fear of current or ex partner: Not on file     Emotionally abused: Not on file     Physically abused: Not on file     Forced sexual activity: Not on file   Other Topics Concern     Parent/sibling w/ CABG, MI or angioplasty before 65F 55M? Yes   Social  History Narrative     Not on file     Physical Exam:  BP 95/57   Pulse 62   Temp 97.6  F (36.4  C)   Wt 82.3 kg (181 lb 6.4 oz)   SpO2 98%   BMI 26.78 kg/m     GA: NAD  HEENT: neck supple, no cervical LAD  CV: regular, normal rate, no rub, no edema  PULM: lungs CTA B  GI: abdomen soft, NT, ND  : no CVA tenderness  MSK: no joint effusions  SKIN: no concerning rash  NEURO: no gross focal deficit  PSYCH: mood/affect appropriate    Laboratory:  CMP  Recent Labs   Lab Test 07/08/19  1020 05/01/19  1129 04/12/19  0711 04/01/19  1154 02/26/19  0715  01/26/19  0552 01/25/19  0445  11/06/17  1050 11/05/17  1000 11/04/17  0745   NA PENDING 139 138 139 144   < >  --  139   < > 139 142 142   POTASSIUM PENDING 4.8 4.0 4.3 4.1   < >  --  4.0   < > 5.3 4.7 4.0   CHLORIDE PENDING 108 104 109 112*   < >  --  106   < > 110* 111* 112*   CO2 24 25 27 24 22   < >  --  26   < > 23 23 23   ANIONGAP PENDING 5 7 6 9   < >  --  6   < > 7 8 8   * 116* 148* 148* 87   < >  --  196*   < > 365* 240* 158*   BUN 27 30 28 19 22   < >  --  36*   < > 23 20 19   CR 1.25 1.65* 1.49* 1.12 1.20   < >  --  1.31*   < > 0.93 0.94 0.91   GFRESTIMATED 64 46* 52* 73 68   < >  --  61   < > 85 84 87   GFRESTBLACK 74 53* 60* 85 78   < >  --  71   < > >90 >90 >90   DEBORAH 8.9 10.2* 9.2 9.0 8.3*   < >  --  7.6*   < > 8.9 8.9 9.1   MAG  --   --   --   --   --   --   --  2.0  --  1.8 1.7 1.8   PHOS 3.1  --   --   --   --   --  3.4 2.1*  --  2.7 3.1 2.6   PROTTOTAL 7.2  --  7.4 7.3 7.2   < >  --   --    < > 6.4* 6.6* 6.7*   ALBUMIN 3.0*  --  3.0* 2.9* 2.8*   < >  --   --    < > 2.7* 2.9* 2.9*   BILITOTAL 1.1  --  1.3 1.8* 1.4*   < >  --   --    < > 0.7 1.3 1.3   ALKPHOS 138  --  120 114 122   < >  --   --    < > 131 109 79   AST 47*  --  38 51* 39   < >  --   --    < > 40 44 37   ALT 50  --  34 44 32   < >  --   --    < > 42 41 34    < > = values in this interval not displayed.     CBC  Recent Labs   Lab Test 07/08/19  1020 04/01/19  1154 02/26/19  0715  02/04/19  0649   HGB 12.1* 13.7 13.4 13.1*   WBC 3.1* 4.2 3.9* 3.6*   RBC 3.29* 3.91* 3.64* 3.54*   HCT 35.7* 42.4 40.2 36.8*   * 108* 110* 104*   MCH 36.8* 35.0* 36.8* 37.0*   MCHC 33.9 32.3 33.3 35.6   RDW 14.6 14.5 15.5* 15.0   PLT 36* 41* 57* 62*     INR  Recent Labs   Lab Test 07/08/19  1020 04/01/19  1154 02/26/19  0715 02/04/19  0649 01/22/19  1040   INR 1.27* 1.34* 1.36* 1.39* 1.24*   PTT  --   --   --   --  32     ABG  No lab results found.   URINE STUDIES  Recent Labs   Lab Test 07/08/19  1017 02/04/19  0705 01/25/19  0520   COLOR Yellow Yellow Yellow   APPEARANCE Clear Clear Clear   URINEGLC >499* 150* >1000*   URINEBILI Negative Negative Negative   URINEKETONE Negative Negative Negative   SG 1.017 1.016 1.020   UBLD Negative Negative Negative   URINEPH 5.0 5.0 5.0   PROTEIN Negative Negative Negative   NITRITE Negative Negative Negative   LEUKEST Negative Negative Negative   RBCU <1 1 <1   WBCU 3 1 <1     Recent Labs   Lab Test 07/08/19  1017 02/04/19  0705   [facility] 0.11 0.14     PTH  No lab results found.  IRON STUDIES   Recent Labs   Lab Test 12/28/18  1108 09/15/18  1215 06/09/17  1250   IRON  --  52  --    FEB  --  403  --    IRONSAT  --  13*  --    QUAN 90 10* 450*     Scribe Disclosure:   I, Trish Nolasco, am serving as a scribe to document services personally performed by Eloy Rao MD at this visit, based upon the provider's statements to me. All documentation has been reviewed by the aforementioned provider prior to being entered into the official medical record.     Total time spent was >60 minutes, and more than 50% of face to face time was spent in counseling and/or coordination of care regarding principles of multidisciplinary care, medication management, and chronic kidney disease education.  Eloy Rao MD

## 2019-07-08 NOTE — Clinical Note
JERONIMO resolved after diuretics discontinued.  Hypotensive in clinic but he is doing fine.  Just keep it on your radar to reduce carvedilol should he remain hypotensive.  Thank you. Angela

## 2019-07-08 NOTE — LETTER
2019      RE: Frandy Workman  [address]  [place] [address]         Nephrology Clinic    Eloy Rao MD  2019     Name: Frandy Workman  MRN: [id number]  Age: 54 year old  : 1965  Referring provider: Santi Dotson     Assessment and Plan:  Frandy Workman is a 54 year old man with ESTRADA cirrhosis (complicated by past ascites and hepatic encephalopathy on lactulose and rifaximin), hepatocellular carcinoma (s/p TACE and microwave ablation 2019), long standing DM 2 (complicated by nonproliferative diabetic retinopathy, macular edema and peripheral neuropathy), bipolar disorder (previously on lithium for 15 years), HTN, hyperlipidemia and CAD (by angiography not requiring PCI) who presents for evaluation of recent JERONIMO on CKD.     1. JERONIMO on CKD, stage 2: Baseline Cr ~1.1 mg/dL with eGFR of 75 ml/min. Recent JERONIMO event likely pre-renal and related to initiation of diuretics.  Fortunately, his renal function is back to baseline and he remains euvolemic after cessation of diuretic therapy.  He does appear to have CKD though mild.  I suspect he likely has some degree of diabetic changes though not albuminuric further complicated by chronic changes from past lithium use (for 15 years).  These underlying changes likely made him susceptible to pre-renal rise in creatinine from diuretics.    -agree with avoiding diuretics for now  -no pressing need to start RAAS blockade with ACEi/ARB therapy  -he otherwise has no major electrolyte, CKD-MBD or anemia issues that need to be addressed   -would favor higher blood pressures to avoid renal hypoperfusion (BP 95/57 today)  -RTC in 6 months    2. HTN/Volume status: Hypotensive (BP 95/57) in clinic today though with no overt symptoms.  Euvolemic on exam despite discontinuation of diuretics.  Currently on carvedilol (12.5 mg BID) likely for esophogeal varices.      -would favor high blood pressures to assure adequate renal perfusion in the  setting of cirrhosis.  Having said that, he seems to be tolerating it relatively well though I do have a low threshold to decreasing this medication provided that his risk of bleeding esophogeal varices does not increase.        Follow-up: Return in about 6 months (around 1/8/2020).     Reason For Visit: CKD    HPI:   Frandy Workman is a 54 year old man who present for evaluation CKD.  His past medical history is remarkable for ESTRADA cirrhosis (complicated by past ascites and hepatic encephalopathy on lactulose and rifaximin), hepatocellular carcinoma (s/p TACE and microwave ablation 01/2019) long standing DM 2 (complicated by nonproliferative diabetic retinopathy, macular edema and peripheral neuropathy), bipolar disorder (previously on lithium for 15 years), HTN, hyperlipidemia and CAD by angiography not requiring PCI.     In terms of CKD, he appears to have a baseline of ~1.1 mg/dL over the past year.  However, he was noted to have a rise in creatinine to ~1.7 mg/dL in May, 2019 after initiation of diuretic therapy.  He has since discontinued diuretics and his Cr is nearly back to baseline.  He denies excessive use of NSAIDs.  He had no history of nephrolithiasis or frequent UTIs.  He has no significant family history of CKD.  He       Today, the patient reports that he is doing well. He does complain of neuropathic pain in his feet at night. He appears to be on carvedilol for esophogeal varices. He reports occasional dizziness upon standing but denies orthostatic symptoms today.  He reports being on lithium for 15 years, which may have resulted in a 2 week coma in December 2014 and a small set back in February 2015. He denies dysuria, gross hematuria, flank pain.      Review of Systems:   Pertinent items are noted in HPI or as below, remainder of complete ROS is negative.      Active Medications:     Current Outpatient Medications:      ACCU-CHEK EDINSON PLUS test strip, USE TO TEST BLOOD SUGARS FOUR TIMES DAILY  OR AS DIRECTED, Disp: 150 strip, Rfl: 11     alpha-lipoic acid 600 MG capsule, Take 1 capsule (600 mg) by mouth daily, Disp: 90 capsule, Rfl: 3     Artificial Tear Solution (SM ARTIFICIAL TEARS) SOLN, Place 1 drop into the right eye every hour as needed Apply at least 4 times daily and as needed for dry eye, Disp: 1 Bottle, Rfl: 3     aspirin (ASA) 81 MG EC tablet, Take 1 tablet (81 mg) by mouth daily, Disp: 90 tablet, Rfl: 3     BD VIKTORIA U/F 32G X 4 MM insulin pen needle, Use 5 per day, Disp: 300 each, Rfl: 3     blood glucose monitoring (ACCU-CHEK EDINSON PLUS) test strip, USE TO TEST BLOOD SUGARS FOUR TIMES DAILY OR AS DIRECTED, Disp: 400 strip, Rfl: 3     blood glucose monitoring (ACCU-CHEK EDINSON PLUS) test strip, USE TO TEST BLOOD SUGARS FOUR TIMES DAILY OR AS DIRECTED, Disp: 300 strip, Rfl: 3     blood glucose monitoring (ACCU-CHEK EDISNON PLUS) test strip, Use to test blood sugar 4 times daily, Disp: 400 each, Rfl: 3     blood glucose monitoring (ACCU-CHEK FASTCLIX) lancets, Use to test blood sugar 4 times daily or as directed.  1 box = 102 lancets, Disp: 408 each, Rfl: 3     carvedilol (COREG) 12.5 MG tablet, TAKE 1 TABLET(12.5 MG) BY MOUTH TWICE DAILY WITH MEALS, Disp: 180 tablet, Rfl: 3     Cholecalciferol (VITAMIN D-3 PO), Take 2,000 Units by mouth every morning , Disp: , Rfl:      cyanocobalamin (RA VITAMIN B-12 TR) 1000 MCG TBCR, Take 5,000 mcg by mouth every morning , Disp: , Rfl:      dapagliflozin (FARXIGA) 10 MG TABS tablet, Take 1 tablet (10 mg) by mouth daily, Disp: 90 tablet, Rfl: 3     diclofenac (VOLTAREN) 1 % topical gel, Place 2 g onto the skin 4 times daily, Disp: 100 g, Rfl: 3     econazole nitrate 1 % external cream, APPLY TOPICALLY DAILY TO FEET AND TOENAILS, Disp: 85 g, Rfl: 0     ferrous sulfate (FEROSUL) 325 (65 Fe) MG tablet, Take 325 mg by mouth 3 times daily (with meals) , Disp: , Rfl:      insulin degludec (TRESIBA) 200 UNIT/ML pen, Take 100 units daily. (Patient taking differently:  Take 90 units daily.), Disp: 100 mL, Rfl: 3     insulin pen needle (BD VIKTORIA U/F) 32G X 4 MM miscellaneous, USE 5 PER DAY, Disp: 300 each, Rfl: 2     lactulose (CHRONULAC) 10 GM/15ML solution, Take 45 mLs (30 g) by mouth 4 times daily, Disp: 5000 mL, Rfl: 11     metFORMIN (GLUCOPHAGE-XR) 500 MG 24 hr tablet, Take 1 tablet (500 mg) by mouth daily (with breakfast), Disp: 90 tablet, Rfl: 1     Multiple Vitamin (THERAVITE PO), Take 1 tablet by mouth every morning , Disp: , Rfl:      NOVOLOG FLEXPEN 100 UNIT/ML soln, INJECT 1 UNIT PER 4 GRAMS OF CARBS AT MEALS AND SNACKS. CORRECTION SCALE OF 1 UNITS PER 25 OVER [address] AVE DOSE 75 UNITERS PER DAY, Disp: 30 mL, Rfl: 3     omeprazole (PRILOSEC) 40 MG DR capsule, TAKE 1 CAPSULE(40 MG) BY MOUTH DAILY, Disp: 90 capsule, Rfl: 0     potassium chloride ER (K-DUR/KLOR-CON M) 20 MEQ CR tablet, Take 1 tablet (20 mEq) by mouth daily, Disp: 90 tablet, Rfl: 3     rifaximin (XIFAXAN) 550 MG TABS tablet, TAKE 1 TABLET(550 MG) BY MOUTH TWICE DAILY, Disp: 60 tablet, Rfl: 3     rosuvastatin (CRESTOR) 20 MG tablet, Take 1 tablet (20 mg) by mouth daily, Disp: 60 tablet, Rfl: 2     tamsulosin (FLOMAX) 0.4 MG capsule, TAKE 1 CAPSULE(0.4 MG) BY MOUTH DAILY, Disp: 90 capsule, Rfl: 0     traZODone (DESYREL) 50 MG tablet, Take 1 tablet (50 mg) by mouth At Bedtime, Disp: 90 tablet, Rfl: 1     furosemide (LASIX) 20 MG tablet, Take 1 tablet (20 mg) by mouth daily, Disp: 90 tablet, Rfl: 0     spironolactone (ALDACTONE) 50 MG tablet, Take 1 tablet (50 mg) by mouth daily, Disp: 90 tablet, Rfl: 0    Current Facility-Administered Medications:      Aflibercept (EYLEA) injection 2 mg, 2 mg, Intravitreal, Q28 Days, Enriqueta Martin MD, 2 mg at 06/27/19 1303     Aflibercept (EYLEA) injection 2 mg, 2 mg, Intravitreal, Q28 Days, Enriqueta Martin MD, 2 mg at 06/27/19 1303     bevacizumab (AVASTIN) intravitreal inj 1.25 mg, 1.25 mg, Intravitreal, Q28 Days, Enriqueta Martin MD, 1.25 mg at  03/28/19 1224     bevacizumab (AVASTIN) intravitreal inj 1.25 mg, 1.25 mg, Intravitreal, Q28 Days, Enriqueta Martin MD, 1.25 mg at 03/28/19 1224     ranibizumab (LUCENTIS) injection syringe 0.5 mg, 0.5 mg, Intravitreal, Q28 Days, Enriqueta Martin MD     Allergies:   Codeine      Past Medical History:  Past Medical History:   Diagnosis Date     Anemia 2013    Low blood plates current is 37     Arthritis      BPH (benign prostatic hyperplasia)      CAD (coronary artery disease) 4/1/2019     Cholelithiasis      Conductive hearing loss 8/16/2017    Have a lump on my right side of my face.  Had wax discharge     Depressive disorder 1986    Suffer effects throughout life     Gastroesophageal reflux disease 12/1/2014    Being treated with Prilosac     HCC (hepatocellular carcinoma) (H) 1/22/2019     Hepatitis 2014    Diagnosed with schrosis ESTRADA in 2014.  Suffer from hepatatie     HTN (hypertension)      Hyperlipidemia      Liver cirrhosis secondary to ESTRADA (H)      Portal vein thrombosis 4/11/2019     Type II diabetes mellitus (H)     Insulin adminstered BID daily.      Patient Active Problem List   Diagnosis     Hepatic cirrhosis (H)     Type II diabetes mellitus (H)     Bipolar affective disorder in remission (H)     Esophageal varices (H)     Nonalcoholic steatohepatitis (ESTRADA)     Brow ptosis     Paralytic lagophthalmos of right upper eyelid     Erectile dysfunction due to diseases classified elsewhere     HCC (hepatocellular carcinoma) (H)     Acute pain of both shoulders     Equivocal stress echocardiogram     Type 2 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, unspecified whether long term insulin use (H)     Abnormal findings diagnostic imaging of heart and coronary circulation     Status post coronary angiogram     CAD (coronary artery disease)     Portal vein thrombosis     Past Surgical History:  Past Surgical History:   Procedure Laterality Date      COLONOSCOPY      2015     CV HEART CATHETERIZATION WITH POSSIBLE INTERVENTION N/A 2/26/2019    Procedure: CORS;  Surgeon: Jagdish Hoyt MD;  Location:  HEART CARDIAC CATH LAB     ESOPHAGOSCOPY, GASTROSCOPY, DUODENOSCOPY (EGD), COMBINED N/A 11/17/2016    Procedure: COMBINED ESOPHAGOSCOPY, GASTROSCOPY, DUODENOSCOPY (EGD);  Surgeon: Santi Rosas MD;  Location:  GI     ESOPHAGOSCOPY, GASTROSCOPY, DUODENOSCOPY (EGD), COMBINED N/A 11/17/2017    Procedure: COMBINED ESOPHAGOSCOPY, GASTROSCOPY, DUODENOSCOPY (EGD);  EGD;  Surgeon: Santi Rosas MD;  Location:  GI     ESOPHAGOSCOPY, GASTROSCOPY, DUODENOSCOPY (EGD), COMBINED N/A 12/28/2018    Procedure: EGD;  Surgeon: Santi Rosas MD;  Location:  OR     HEAD & NECK SURGERY      12/2017 at [facility].      IMPLANT GOLD WEIGHT EYELID Right 11/16/2017    Procedure: IMPLANT WEIGHT EYELID;  Right Upper Eyelid Weight, right tarsal strip lower eyelid;  Surgeon: Milana Malave MD;  Location:  OR     IR CHEMO EMBOLIZATION  1/22/2019     KNEE SURGERY Left      ORTHOPEDIC SURGERY       PAROTIDECTOMY, RADICAL NECK DISSECTION Right 11/2/2017    Procedure: PAROTIDECTOMY, RADICAL NECK DISSECTION;  Right Superfacial Parotidectomy , Facial nerve repair. with [facility] facial nerve monitor.;  Surgeon: Asiya Morgan MD;  Location: [facility] OR     PICC INSERTION Left 11/06/2017    4fr SL BioFlo PICC, 44cm in the L basilic vein w/ tip in the low SVC     VASCULAR SURGERY       Family History:   - Cousin had to have kidneys replaced  Family History   Problem Relation Age of Onset     Prostate Cancer Maternal Grandfather      Substance Abuse Maternal Grandfather         Alcohol     Colon Cancer Father 60     Pancreatic Cancer Father 60     Prostate Cancer Father      Colorectal Cancer Father      Macular Degeneration Father      Cancer Father      Glaucoma Father      Skin Cancer Father      Colorectal Cancer Maternal Grandmother      Cancer Maternal  Grandmother      Substance Abuse Maternal Grandmother         Alcohol     Colorectal Cancer Paternal Grandmother      Cancer Mother      Diabetes Mother          3/2016     Cerebrovascular Disease Mother         Passed away in Feb of this year, 80 years old.     Thyroid Disease Mother      Depression Mother      Asthma Sister         Had since birth     Thyroid Disease Sister      Depression Sister      Liver Disease No family hx of      Melanoma No family hx of       Social History:   Social History     Socioeconomic History     Marital status:      Spouse name: Not on file     Number of children: Not on file     Years of education: Not on file     Highest education level: Not on file   Occupational History     Not on file   Social Needs     Financial resource strain: Not on file     Food insecurity:     Worry: Not on file     Inability: Not on file     Transportation needs:     Medical: Not on file     Non-medical: Not on file   Tobacco Use     Smoking status: Never Smoker     Smokeless tobacco: Former User     Types: Chew     Tobacco comment: 1 tin per week   Substance and Sexual Activity     Alcohol use: No     Alcohol/week: 0.0 oz     Comment: quit 1996     Drug use: No     Sexual activity: Not Currently     Partners: Female     Birth control/protection: Condom   Lifestyle     Physical activity:     Days per week: Not on file     Minutes per session: Not on file     Stress: Not on file   Relationships     Social connections:     Talks on phone: Not on file     Gets together: Not on file     Attends Restorationist service: Not on file     Active member of club or organization: Not on file     Attends meetings of clubs or organizations: Not on file     Relationship status: Not on file     Intimate partner violence:     Fear of current or ex partner: Not on file     Emotionally abused: Not on file     Physically abused: Not on file     Forced sexual activity: Not on file   Other Topics Concern      Parent/sibling w/ CABG, MI or angioplasty before 65F 55M? Yes   Social History Narrative     Not on file     Physical Exam:  BP 95/57   Pulse 62   Temp 97.6  F (36.4  C)   Wt 82.3 kg (181 lb 6.4 oz)   SpO2 98%   BMI 26.78 kg/m      GA: NAD  HEENT: neck supple, no cervical LAD  CV: regular, normal rate, no rub, no edema  PULM: lungs CTA B  GI: abdomen soft, NT, ND  : no CVA tenderness  MSK: no joint effusions  SKIN: no concerning rash  NEURO: no gross focal deficit  PSYCH: mood/affect appropriate    Laboratory:  CMP  Recent Labs   Lab Test 07/08/19  1020 05/01/19  1129 04/12/19  0711 04/01/19  1154 02/26/19  0715  01/26/19  0552 01/25/19  0445  11/06/17  1050 11/05/17  1000 11/04/17  0745   NA PENDING 139 138 139 144   < >  --  139   < > 139 142 142   POTASSIUM PENDING 4.8 4.0 4.3 4.1   < >  --  4.0   < > 5.3 4.7 4.0   CHLORIDE PENDING 108 104 109 112*   < >  --  106   < > 110* 111* 112*   CO2 24 25 27 24 22   < >  --  26   < > 23 23 23   ANIONGAP PENDING 5 7 6 9   < >  --  6   < > 7 8 8   * 116* 148* 148* 87   < >  --  196*   < > 365* 240* 158*   BUN 27 30 28 19 22   < >  --  36*   < > 23 20 19   CR 1.25 1.65* 1.49* 1.12 1.20   < >  --  1.31*   < > 0.93 0.94 0.91   GFRESTIMATED 64 46* 52* 73 68   < >  --  61   < > 85 84 87   GFRESTBLACK 74 53* 60* 85 78   < >  --  71   < > >90 >90 >90   DEBORAH 8.9 10.2* 9.2 9.0 8.3*   < >  --  7.6*   < > 8.9 8.9 9.1   MAG  --   --   --   --   --   --   --  2.0  --  1.8 1.7 1.8   PHOS 3.1  --   --   --   --   --  3.4 2.1*  --  2.7 3.1 2.6   PROTTOTAL 7.2  --  7.4 7.3 7.2   < >  --   --    < > 6.4* 6.6* 6.7*   ALBUMIN 3.0*  --  3.0* 2.9* 2.8*   < >  --   --    < > 2.7* 2.9* 2.9*   BILITOTAL 1.1  --  1.3 1.8* 1.4*   < >  --   --    < > 0.7 1.3 1.3   ALKPHOS 138  --  120 114 122   < >  --   --    < > 131 109 79   AST 47*  --  38 51* 39   < >  --   --    < > 40 44 37   ALT 50  --  34 44 32   < >  --   --    < > 42 41 34    < > = values in this interval not displayed.      CBC  Recent Labs   Lab Test 07/08/19  1020 04/01/19  1154 02/26/19  0715 02/04/19  0649   HGB 12.1* 13.7 13.4 13.1*   WBC 3.1* 4.2 3.9* 3.6*   RBC 3.29* 3.91* 3.64* 3.54*   HCT 35.7* 42.4 40.2 36.8*   * 108* 110* 104*   MCH 36.8* 35.0* 36.8* 37.0*   MCHC 33.9 32.3 33.3 35.6   RDW 14.6 14.5 15.5* 15.0   PLT 36* 41* 57* 62*     INR  Recent Labs   Lab Test 07/08/19  1020 04/01/19  1154 02/26/19  0715 02/04/19  0649 01/22/19  1040   INR 1.27* 1.34* 1.36* 1.39* 1.24*   PTT  --   --   --   --  32     ABG  No lab results found.   URINE STUDIES  Recent Labs   Lab Test 07/08/19  1017 02/04/19  0705 01/25/19  0520   COLOR Yellow Yellow Yellow   APPEARANCE Clear Clear Clear   URINEGLC >499* 150* >1000*   URINEBILI Negative Negative Negative   URINEKETONE Negative Negative Negative   SG 1.017 1.016 1.020   UBLD Negative Negative Negative   URINEPH 5.0 5.0 5.0   PROTEIN Negative Negative Negative   NITRITE Negative Negative Negative   LEUKEST Negative Negative Negative   RBCU <1 1 <1   WBCU 3 1 <1     Recent Labs   Lab Test 07/08/19  1017 02/04/19  0705   [facility] 0.11 0.14     PTH  No lab results found.  IRON STUDIES   Recent Labs   Lab Test 12/28/18  1108 09/15/18  1215 06/09/17  1250   IRON  --  52  --    FEB  --  403  --    IRONSAT  --  13*  --    QUAN 90 10* 450*     Scribe Disclosure:   I, Trish Nolasco, am serving as a scribe to document services personally performed by Eloy Rao MD at this visit, based upon the provider's statements to me. All documentation has been reviewed by the aforementioned provider prior to being entered into the official medical record.     Total time spent was >60 minutes, and more than 50% of face to face time was spent in counseling and/or coordination of care regarding principles of multidisciplinary care, medication management, and chronic kidney disease education.  MD Eloy Nelson MD

## 2019-07-10 ENCOUNTER — DOCUMENTATION ONLY (OUTPATIENT)
Dept: TRANSPLANT | Facility: CLINIC | Age: 54
End: 2019-07-10

## 2019-07-10 LAB
DEPRECATED CALCIDIOL+CALCIFEROL SERPL-MC: <78 UG/L (ref 20–75)
VITAMIN D2 SERPL-MCNC: <5 UG/L
VITAMIN D3 SERPL-MCNC: 73 UG/L

## 2019-07-10 NOTE — PROGRESS NOTES
Following submitted for HCC points    This is that first extension for a previously approved initial HCC exception. This is a 54 year old with ESTRADA cirrhosis who had a MRI on 12/23/18 that showed a 3.1 5B and a 1.1 5A with an AFP of 5 on 12/28/18. The patient underwent TACE on 1/22/19 and MWA on 1/23/19. Follow up MRI on 4/12/19 shows no HCC and an AFP of 4 on 7/8/19. This patient has had a great response to treatment and is within [place] criteria. We are requesting the patient be granted the first HCC MELD exception extension.

## 2019-07-10 NOTE — NURSING NOTE
"Chief Complaint   Patient presents with     Procedure     right upper eyelid weight removal     Blood pressure 112/68, pulse 70, height 1.753 m (5' 9\"), weight 86.6 kg (191 lb).    Dave Yadav    " no

## 2019-07-18 ENCOUNTER — ANCILLARY PROCEDURE (OUTPATIENT)
Dept: MRI IMAGING | Facility: CLINIC | Age: 54
End: 2019-07-18
Attending: INTERNAL MEDICINE
Payer: MEDICARE

## 2019-07-18 DIAGNOSIS — C22.0 HCC (HEPATOCELLULAR CARCINOMA) (H): ICD-10-CM

## 2019-07-18 RX ORDER — GADOBUTROL 604.72 MG/ML
7.5 INJECTION INTRAVENOUS ONCE
Status: COMPLETED | OUTPATIENT
Start: 2019-07-18 | End: 2019-07-18

## 2019-07-18 RX ADMIN — GADOBUTROL 7.5 ML: 604.72 INJECTION INTRAVENOUS at 07:34

## 2019-07-18 NOTE — DISCHARGE INSTRUCTIONS
MRI Contrast Discharge Instructions    The IV contrast you received today will pass out of your body in your  urine. This will happen in the next 24 hours. You will not feel this process.  Your urine will not change color.    Drink at least 4 extra glasses of water or juice today (unless your doctor  has restricted your fluids). This reduces the stress on your kidneys.  You may take your regular medicines.    If you are on dialysis: It is best to have dialysis today.    If you have a reaction: Most reactions happen right away. If you have  any new symptoms after leaving the hospital (such as hives or swelling),  call your hospital at the correct number below. Or call your family doctor.  If you have breathing distress or wheezing, call 911.    Special instructions: ***    I have read and understand the above information.    Signature:______________________________________ Date:___________    Staff:__________________________________________ Date:___________     Time:__________    [facility] Departments:    ___[place]: [phone]  ___[facility]: [phone]  ___[place]: [phone] ___[facility]: [phone]  ___[facility]: [phone]  ___[facility]: [phone]  ___[phone]  ___[facility]: [phone]  ___[phone]

## 2019-07-21 DIAGNOSIS — N40.1 BENIGN PROSTATIC HYPERPLASIA WITH LOWER URINARY TRACT SYMPTOMS: ICD-10-CM

## 2019-07-22 ENCOUNTER — ONCOLOGY VISIT (OUTPATIENT)
Dept: ONCOLOGY | Facility: CLINIC | Age: 54
End: 2019-07-22
Attending: INTERNAL MEDICINE
Payer: MEDICARE

## 2019-07-22 ENCOUNTER — OFFICE VISIT (OUTPATIENT)
Dept: GASTROENTEROLOGY | Facility: CLINIC | Age: 54
End: 2019-07-22
Attending: INTERNAL MEDICINE
Payer: MEDICARE

## 2019-07-22 VITALS
OXYGEN SATURATION: 99 % | BODY MASS INDEX: 27.25 KG/M2 | TEMPERATURE: 97.8 F | WEIGHT: 184 LBS | DIASTOLIC BLOOD PRESSURE: 72 MMHG | SYSTOLIC BLOOD PRESSURE: 115 MMHG | HEIGHT: 69 IN | HEART RATE: 70 BPM

## 2019-07-22 VITALS
BODY MASS INDEX: 27.27 KG/M2 | HEIGHT: 69 IN | DIASTOLIC BLOOD PRESSURE: 72 MMHG | RESPIRATION RATE: 18 BRPM | TEMPERATURE: 97.8 F | OXYGEN SATURATION: 99 % | SYSTOLIC BLOOD PRESSURE: 115 MMHG | HEART RATE: 70 BPM | WEIGHT: 184.08 LBS

## 2019-07-22 DIAGNOSIS — C22.0 HCC (HEPATOCELLULAR CARCINOMA) (H): ICD-10-CM

## 2019-07-22 DIAGNOSIS — Z76.82 LIVER TRANSPLANT CANDIDATE: ICD-10-CM

## 2019-07-22 DIAGNOSIS — C22.0 HCC (HEPATOCELLULAR CARCINOMA) (H): Primary | ICD-10-CM

## 2019-07-22 DIAGNOSIS — K74.60 LIVER CIRRHOSIS SECONDARY TO NASH (H): Primary | ICD-10-CM

## 2019-07-22 DIAGNOSIS — K75.81 LIVER CIRRHOSIS SECONDARY TO NASH (H): Primary | ICD-10-CM

## 2019-07-22 PROBLEM — M25.512 ACUTE PAIN OF BOTH SHOULDERS: Status: RESOLVED | Noted: 2019-01-24 | Resolved: 2019-07-22

## 2019-07-22 PROBLEM — M25.511 ACUTE PAIN OF BOTH SHOULDERS: Status: RESOLVED | Noted: 2019-01-24 | Resolved: 2019-07-22

## 2019-07-22 LAB
ALBUMIN SERPL-MCNC: 2.9 G/DL (ref 3.4–5)
ALP SERPL-CCNC: 134 U/L (ref 40–150)
ALT SERPL W P-5'-P-CCNC: 52 U/L (ref 0–70)
ANION GAP SERPL CALCULATED.3IONS-SCNC: 3 MMOL/L (ref 3–14)
AST SERPL W P-5'-P-CCNC: 52 U/L (ref 0–45)
BASOPHILS # BLD AUTO: 0 10E9/L (ref 0–0.2)
BASOPHILS NFR BLD AUTO: 0.6 %
BILIRUB DIRECT SERPL-MCNC: 0.4 MG/DL (ref 0–0.2)
BILIRUB SERPL-MCNC: 1.1 MG/DL (ref 0.2–1.3)
BUN SERPL-MCNC: 23 MG/DL (ref 7–30)
CALCIUM SERPL-MCNC: 8.7 MG/DL (ref 8.5–10.1)
CHLORIDE SERPL-SCNC: 114 MMOL/L (ref 94–109)
CO2 SERPL-SCNC: 25 MMOL/L (ref 20–32)
CREAT SERPL-MCNC: 1.35 MG/DL (ref 0.66–1.25)
DIFFERENTIAL METHOD BLD: ABNORMAL
EOSINOPHIL # BLD AUTO: 0.1 10E9/L (ref 0–0.7)
EOSINOPHIL NFR BLD AUTO: 4.3 %
ERYTHROCYTE [DISTWIDTH] IN BLOOD BY AUTOMATED COUNT: 14.1 % (ref 10–15)
GFR SERPL CREATININE-BSD FRML MDRD: 58 ML/MIN/{1.73_M2}
GLUCOSE SERPL-MCNC: 80 MG/DL (ref 70–99)
HCT VFR BLD AUTO: 36.6 % (ref 40–53)
HGB BLD-MCNC: 12.2 G/DL (ref 13.3–17.7)
IMM GRANULOCYTES # BLD: 0 10E9/L (ref 0–0.4)
IMM GRANULOCYTES NFR BLD: 0.3 %
INR PPP: 1.31 (ref 0.86–1.14)
LYMPHOCYTES # BLD AUTO: 0.6 10E9/L (ref 0.8–5.3)
LYMPHOCYTES NFR BLD AUTO: 18.5 %
MCH RBC QN AUTO: 36.6 PG (ref 26.5–33)
MCHC RBC AUTO-ENTMCNC: 33.3 G/DL (ref 31.5–36.5)
MCV RBC AUTO: 110 FL (ref 78–100)
MONOCYTES # BLD AUTO: 0.3 10E9/L (ref 0–1.3)
MONOCYTES NFR BLD AUTO: 9 %
NEUTROPHILS # BLD AUTO: 2.2 10E9/L (ref 1.6–8.3)
NEUTROPHILS NFR BLD AUTO: 67.3 %
NRBC # BLD AUTO: 0 10*3/UL
NRBC BLD AUTO-RTO: 0 /100
PLATELET # BLD AUTO: 34 10E9/L (ref 150–450)
PLATELET # BLD EST: ABNORMAL 10*3/UL
POTASSIUM SERPL-SCNC: 4.2 MMOL/L (ref 3.4–5.3)
PROT SERPL-MCNC: 6.9 G/DL (ref 6.8–8.8)
RBC # BLD AUTO: 3.33 10E12/L (ref 4.4–5.9)
SODIUM SERPL-SCNC: 142 MMOL/L (ref 133–144)
WBC # BLD AUTO: 3.2 10E9/L (ref 4–11)

## 2019-07-22 PROCEDURE — 99214 OFFICE O/P EST MOD 30 MIN: CPT | Mod: ZP | Performed by: INTERNAL MEDICINE

## 2019-07-22 PROCEDURE — G0463 HOSPITAL OUTPT CLINIC VISIT: HCPCS | Mod: ZF

## 2019-07-22 ASSESSMENT — PAIN SCALES - GENERAL
PAINLEVEL: MODERATE PAIN (5)
PAINLEVEL: MODERATE PAIN (5)

## 2019-07-22 ASSESSMENT — MIFFLIN-ST. JEOR
SCORE: 1660
SCORE: 1660.63

## 2019-07-22 NOTE — LETTER
7/22/2019       RE: Frandy Workman  [address]  [facility] [address]     Dear Colleague,    Thank you for referring your patient, Frandy Workman, to the [facility]. Please see a copy of my visit note below.    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   He used to live in [place], but moved to [place] to be closer to his daughter, although he seems to be estranged from her now, as well as the rest of his family.  He has cirrhosis felt secondary to ESTRADA, and says that he was on the transplant list at [place] when he lived in [place].  He follows with Dr. Banuelos here in the hepatology clinic now.  He underwent routine screening ultrasound on 12/10/18, which showed a non-specific left hepatic lobe lesion.  MRI abdomen on 12/23/19 showed 2 lesions, measuring 3.1 cm and 1.1 cm in hepatic segments 4b and 3, respectively, that were LIRADS 5.  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      He underwent TACE on 1/22/19 to segment IV lesion and CT-guided microwave ablation on 1/23/19 to segment III lesion.    He is on liver transplant list.      He does not smoke, but used to chew tobacco.  He had a parotid gland mass removed previously that was benign.  He does not drink alcohol.    He notes that he has no family or friends here.  He is estranged from his family and daughter.  He notes that his neighbor can give him rides to appointments.          INTERIM HISTORY: Miller is here for follow-up today.  He says that he feels well, other than feeling tired.  He says that he hasn't exercised as much, but will try to walk more.      REVIEW OF SYSTEMS:   14 point ROS was reviewed and is  negative other than as noted above in HPI.       HOME MEDICATIONS:  Current Outpatient Medications   Medication Sig Dispense Refill     alpha-lipoic acid 600 MG capsule Take 1 capsule (600 mg) by mouth daily 90 capsule 3     Artificial Tear Solution (SM ARTIFICIAL TEARS) SOLN Place 1 drop into the right eye every hour as needed Apply at least 4 times daily and as needed for dry eye 1 Bottle 3     aspirin (ASA) 81 MG EC tablet Take 1 tablet (81 mg) by mouth daily 90 tablet 3     BD VIKTORIA U/F 32G X 4 MM insulin pen needle Use 5 per day 300 each 3     blood glucose monitoring (ACCU-CHEK EDINSON PLUS) test strip USE TO TEST BLOOD SUGARS FOUR TIMES DAILY OR AS DIRECTED 400 strip 3     blood glucose monitoring (ACCU-CHEK EDINSON PLUS) test strip USE TO TEST BLOOD SUGARS FOUR TIMES DAILY OR AS DIRECTED 300 strip 3     blood glucose monitoring (ACCU-CHEK EDINSON PLUS) test strip Use to test blood sugar 4 times daily 400 each 3     blood glucose monitoring (ACCU-CHEK FASTCLIX) lancets Use to test blood sugar 4 times daily or as directed.  1 box = 102 lancets 408 each 3     carvedilol (COREG) 12.5 MG tablet TAKE 1 TABLET(12.5 MG) BY MOUTH TWICE DAILY WITH MEALS 180 tablet 3     Cholecalciferol (VITAMIN D-3 PO) Take 2,000 Units by mouth every morning        cyanocobalamin (RA VITAMIN B-12 TR) 1000 MCG TBCR Take 5,000 mcg by mouth every morning        dapagliflozin (FARXIGA) 10 MG TABS tablet Take 1 tablet (10 mg) by mouth daily 90 tablet 3     diclofenac (VOLTAREN) 1 % topical gel Place 2 g onto the skin 4 times daily 100 g 3     econazole nitrate 1 % external cream APPLY TOPICALLY DAILY TO FEET AND TOENAILS 85 g 0     ferrous sulfate (FEROSUL) 325 (65 Fe) MG tablet Take 325 mg by mouth 3 times daily (with meals)        insulin degludec (TRESIBA) 200 UNIT/ML pen Take 100 units daily. (Patient taking differently: Take 90 units daily.) 100 mL 3     insulin pen needle (BD VIKTORIA U/F) 32G X 4 MM miscellaneous USE 5 PER  each 2      lactulose (CHRONULAC) 10 GM/15ML solution Take 45 mLs (30 g) by mouth 4 times daily 5000 mL 11     metFORMIN (GLUCOPHAGE-XR) 500 MG 24 hr tablet Take 1 tablet (500 mg) by mouth daily (with breakfast) 90 tablet 1     Multiple Vitamin (THERAVITE PO) Take 1 tablet by mouth every morning        NOVOLOG FLEXPEN 100 UNIT/ML soln INJECT 1 UNIT PER 4 GRAMS OF CARBS AT MEALS AND SNACKS. CORRECTION SCALE OF 1 UNITS PER 25 OVER [address]. AVE DOSE 75 UNITERS PER DAY 30 mL 3     omeprazole (PRILOSEC) 40 MG DR capsule TAKE 1 CAPSULE(40 MG) BY MOUTH DAILY 90 capsule 0     potassium chloride ER (K-DUR/KLOR-CON M) 20 MEQ CR tablet Take 1 tablet (20 mEq) by mouth daily 90 tablet 3     rifaximin (XIFAXAN) 550 MG TABS tablet TAKE 1 TABLET(550 MG) BY MOUTH TWICE DAILY 60 tablet 3     rosuvastatin (CRESTOR) 20 MG tablet Take 1 tablet (20 mg) by mouth daily 60 tablet 2     tamsulosin (FLOMAX) 0.4 MG capsule TAKE 1 CAPSULE(0.4 MG) BY MOUTH DAILY 90 capsule 0     traZODone (DESYREL) 50 MG tablet Take 1 tablet (50 mg) by mouth At Bedtime 90 tablet 1     ACCU-CHEK EDINSON PLUS test strip USE TO TEST BLOOD SUGARS FOUR TIMES DAILY OR AS DIRECTED 150 strip 11     furosemide (LASIX) 20 MG tablet Take 1 tablet (20 mg) by mouth daily 90 tablet 0     spironolactone (ALDACTONE) 50 MG tablet Take 1 tablet (50 mg) by mouth daily 90 tablet 0         ALLERGIES:  Allergies   Allergen Reactions     Codeine Other (See Comments)     Cannot take due to liver  Cannot tolerate oral narcotics     Seasonal Allergies      Sneezing, coughing, runny and itchy eyes         PAST MEDICAL HISTORY:  Past Medical History:   Diagnosis Date     Anemia 2013    Low blood plates current is 37     Arthritis      BPH (benign prostatic hyperplasia)      CAD (coronary artery disease) 4/1/2019     Cholelithiasis      Conductive hearing loss 8/16/2017    Have a lump on my right side of my face.  Had wax discharge     Depressive disorder 1986    Suffer effects throughout life      Gastroesophageal reflux disease 12/1/2014    Being treated with Prilosac     HCC (hepatocellular carcinoma) (H) 1/22/2019     History of diabetic retinopathy 07/2018     HTN (hypertension)      Hyperlipidemia      Liver cirrhosis secondary to ESTRADA (H)      Portal vein thrombosis 4/11/2019     Type II diabetes mellitus (H)     Insulin adminstered BID daily.          PAST SURGICAL HISTORY:  Past Surgical History:   Procedure Laterality Date     COLONOSCOPY      2015     CV HEART CATHETERIZATION WITH POSSIBLE INTERVENTION N/A 2/26/2019    Procedure: CORS;  Surgeon: Jagdish Hoyt MD;  Location:  HEART CARDIAC CATH LAB     ESOPHAGOSCOPY, GASTROSCOPY, DUODENOSCOPY (EGD), COMBINED N/A 11/17/2016    Procedure: COMBINED ESOPHAGOSCOPY, GASTROSCOPY, DUODENOSCOPY (EGD);  Surgeon: Santi Rosas MD;  Location:  GI     ESOPHAGOSCOPY, GASTROSCOPY, DUODENOSCOPY (EGD), COMBINED N/A 11/17/2017    Procedure: COMBINED ESOPHAGOSCOPY, GASTROSCOPY, DUODENOSCOPY (EGD);  EGD;  Surgeon: Santi Rosas MD;  Location:  GI     ESOPHAGOSCOPY, GASTROSCOPY, DUODENOSCOPY (EGD), COMBINED N/A 12/28/2018    Procedure: EGD;  Surgeon: Santi Rosas MD;  Location:  OR     HEAD & NECK SURGERY      12/2017 at [facility].      IMPLANT GOLD WEIGHT EYELID Right 11/16/2017    Procedure: IMPLANT WEIGHT EYELID;  Right Upper Eyelid Weight, right tarsal strip lower eyelid;  Surgeon: Milana Malave MD;  Location:  OR     IR CHEMO EMBOLIZATION  1/22/2019     KNEE SURGERY Left      ORTHOPEDIC SURGERY       PAROTIDECTOMY, RADICAL NECK DISSECTION Right 11/2/2017    Procedure: PAROTIDECTOMY, RADICAL NECK DISSECTION;  Right Superfacial Parotidectomy , Facial nerve repair. with [facility] facial nerve monitor.;  Surgeon: Asiya Morgan MD;  Location: [facility] OR     PICC INSERTION Left 11/06/2017    4fr SL BioFlo PICC, 44cm in the L basilic vein w/ tip in the low SVC     VASCULAR SURGERY           SOCIAL HISTORY:  Social History      Socioeconomic History     Marital status:      Spouse name: Not on file     Number of children: Not on file     Years of education: Not on file     Highest education level: Not on file   Occupational History     Not on file   Social Needs     Financial resource strain: Not on file     Food insecurity:     Worry: Not on file     Inability: Not on file     Transportation needs:     Medical: Not on file     Non-medical: Not on file   Tobacco Use     Smoking status: Never Smoker     Smokeless tobacco: Former User     Types: Chew     Tobacco comment: 1 tin per week   Substance and Sexual Activity     Alcohol use: No     Alcohol/week: 0.0 oz     Comment: quit Sept. 1996     Drug use: No     Sexual activity: Not Currently     Partners: Female     Birth control/protection: Condom   Lifestyle     Physical activity:     Days per week: Not on file     Minutes per session: Not on file     Stress: Not on file   Relationships     Social connections:     Talks on phone: Not on file     Gets together: Not on file     Attends [employer] service: Not on file     Active member of club or organization: Not on file     Attends meetings of clubs or organizations: Not on file     Relationship status: Not on file     Intimate partner violence:     Fear of current or ex partner: Not on file     Emotionally abused: Not on file     Physically abused: Not on file     Forced sexual activity: Not on file   Other Topics Concern     Parent/sibling w/ CABG, MI or angioplasty before 65F 55M? Yes   Social History Narrative     Not on file         FAMILY HISTORY:  Family History   Problem Relation Age of Onset     Prostate Cancer Maternal Grandfather      Substance Abuse Maternal Grandfather         Alcohol     Colon Cancer Father 60     Pancreatic Cancer Father 60     Prostate Cancer Father      Colorectal Cancer Father      Macular Degeneration Father      Cancer Father      Glaucoma Father      Skin Cancer Father      Colorectal Cancer  "Maternal Grandmother      Cancer Maternal Grandmother      Substance Abuse Maternal Grandmother         Alcohol     Colorectal Cancer Paternal Grandmother      Cancer Mother      Diabetes Mother          3/2016     Cerebrovascular Disease Mother         Passed away in Feb of this year, 80 years old.     Thyroid Disease Mother      Depression Mother      Asthma Sister         Had since birth     Thyroid Disease Sister      Depression Sister      Liver Disease No family hx of      Melanoma No family hx of          PHYSICAL EXAM:  Vital signs:  /72 (BP Location: Right arm, Patient Position: Sitting, Cuff Size: Adult Large)   Pulse 70   Temp 97.8  F (36.6  C) (Oral)   Resp 18   Ht 1.753 m (5' 9.02\")   Wt 83.5 kg (184 lb 1.4 oz)   SpO2 99%   BMI 27.17 kg/m      ECO  GENERAL/CONSTITUTIONAL: No acute distress.  EYES: No scleral icterus.  RESPIRATORY: Clear to auscultation bilaterally. No crackles or wheezing.   CARDIOVASCULAR: Regular rate and rhythm without murmurs, gallops, or rubs.  GASTROINTESTINAL: No tenderness. The patient has normal bowel sounds. No guarding.    MUSCULOSKELETAL: Warm and well-perfused.  NEUROLOGIC: Alert, oriented, answers questions appropriately.  INTEGUMENTARY: No jaundice.      LABS:  CBC RESULTS:   Recent Labs   Lab Test 19  1226   WBC 3.2*   RBC 3.33*   HGB 12.2*   HCT 36.6*   *   MCH 36.6*   MCHC 33.3   RDW 14.1   PLT 34*     Recent Labs   Lab Test 19  1226 19  1020    141   POTASSIUM 4.2 3.6   CHLORIDE 114* 110*   CO2 25 24   ANIONGAP 3 8   GLC 80 131*   BUN 23 27   CR 1.35* 1.25   DEBORAH 8.7 8.9     Lab Results   Component Value Date    AST 52 2019     Lab Results   Component Value Date    ALT 52 2019     No results found for: BILICONJ   Lab Results   Component Value Date    BILITOTAL 1.1 2019     Lab Results   Component Value Date    ALBUMIN 2.9 2019     Lab Results   Component Value Date    PROTTOTAL 6.9 " 07/22/2019      Lab Results   Component Value Date    ALKPHOS 134 07/22/2019     INR 1.31    Component      Latest Ref Rng & Units 12/28/2018 2/4/2019 2/26/2019 7/8/2019   Alpha Fetoprotein      0 - 8 ug/L 5.2 4.2 5.5 4.3         IMAGING:  MRI abdomen 7/18/19:  1a. Posttreatment changes in hepatic segments 4a and 4b without any  evidence of recurrent disease. LIRADS TR nonviable.    1b. Posttreatment changes in hepatic segment 3 with more conspicuous  area of arterial enhancement along its inferior margin with no washout  or pseudocapsule, however with restricted diffusion, indeterminate,  could be related to posttreatment changes. Attention on follow-up  studies.  2. Cirrhotic configuration of the liver parenchyma with sequela of  portal hypertension including splenomegaly, gastroesophageal varices  mild colonic wall edema and splenorenal shunts. Small volume of  ascites, slightly increased from the prior study.  3. Moderate increased right pleural effusion with overlying  atelectasis.  4. Stable nonocclusive bland appearing SMV thrombosis.  5. Based on this exam alone, the patient is within Henriette criteria.  6. Cholelithiasis.      ASSESSMENT/PLAN:  Frandy Workman is a 53 year old male with:    1) Hepatocellular carcinoma: Child-Clark A (6).  Patient has a 3.1 cm lesion in segment 4b and 1.1 cm lesion in segment 3.  His CT chest and bone scan were negative for metastatic disease.    He underwent TACE on 1/22/19 to segment IV lesion and CT-guided microwave ablation on 1/23/19 to segment III lesion.    MRI abdomen on 7/18/19 shows post-treatment changes in hepatic segments 4a and 4b without evidence of recurrent disease.  There are post-treatment changes in segment 3, which is indeterminate, but could be related to post-treatment changes.  Will follow-up on this on the next scan.  There is moderate increased right pleural effusion, stable non-occlusive bland appearing SMV thrombus, changes of portal  hypertension.    He is on liver transplant list.  He no longer follows with IR    -MRI and labs/AFP in 3 months  -RTC in 3 months    2) Cirrhosis: secondary to ESTRADA  -follows with hepatology - Dr. Banuelos    3) Thrombocytopenia: This has been a chronic issue and likely secondary to his history of liver disease.  Platelet count at baseline.    4) Diabetes:   -follows with endocrinology      I spent a total of 25 minutes with the patient, with over >50% of the time in counseling and/or coordination of care.       Amanda Lees MD  Hematology/Oncology  [facility]

## 2019-07-22 NOTE — PROGRESS NOTES
[facility]    Hepatology follow-up    Follow-up visit for cirrhosis    Subjective:  54 year old male    Cirrhosis  - dx 2013  - ESTRADA, A1-AT phenotype- PiMZ  - hx HE  - hx ascites  - no hx variceal bleed  - last EGD Dec 2018- small EV, portal hypertensive gastropathy  - HCC screening- see below     HCC  - dx Dec 2018  - MRI liver 12/23/18- 3.1cm lesion segment IVB, 1.1cm lesion segment III  - AFP 12/28/18= 5.2  - bone scan 1/4/19  - CT chest 1/4/19  - TACE 1/22/19 (seg IV)  - Microwave ablation 1/23/19 (seg III)  - last MRI liver 7/18/19   - last AFP 7/8/19= 4.3    Patient comes to clinic this afternoon for follow-up of cirrhosis.  Last clinic visit April 2019.  Patient saw nephrology earlier this month for acute on chronic kidney disease.  He has continued to get intravitreal injections for diabetic macular edema.  Patient denies new medications, ER visits or hospital admissions since last clinic visit.    Patient is well today.  He continues to have fatigue.  He denies any symptoms specific to liver disease.    Patient continues to take lactulose (BID) and rifaximin with 2-3 bowel movements per day.  He denies any overt confusion or lethargy.    Patient denies jaundice, lower extremity edema or abdominal distension.    Patient denies chest pain or dyspnea.  He occasionally has a cough at night.    Patient denies melena, hematemesis or hematochezia.    Patient denies fevers, sweats or chills.      Weight stable.  Appetite is good.    Blood sugars run between  in the morning and 120 in the evening.      Patient does not drink alcohol.  He has no trips planned this summer.      Medical hx Surgical hx   Past Medical History:   Diagnosis Date     Anemia 2013    Low blood plates current is 37     Arthritis      BPH (benign prostatic hyperplasia)      CAD (coronary artery disease) 4/1/2019     Cholelithiasis      Conductive hearing loss 8/16/2017    Have a lump on my right side of my  face.  Had wax discharge     Depressive disorder 1986    Suffer effects throughout life     Gastroesophageal reflux disease 12/1/2014    Being treated with Prilosac     HCC (hepatocellular carcinoma) (H) 1/22/2019     History of diabetic retinopathy      HTN (hypertension)      Hyperlipidemia      Liver cirrhosis secondary to ESTRADA (H)      Portal vein thrombosis 4/11/2019     Type II diabetes mellitus (H)     Insulin adminstered BID daily.       Past Surgical History:   Procedure Laterality Date     COLONOSCOPY      2015     CV HEART CATHETERIZATION WITH POSSIBLE INTERVENTION N/A 2/26/2019    Procedure: CORS;  Surgeon: Jagdish Hoyt MD;  Location:  HEART CARDIAC CATH LAB     ESOPHAGOSCOPY, GASTROSCOPY, DUODENOSCOPY (EGD), COMBINED N/A 11/17/2016    Procedure: COMBINED ESOPHAGOSCOPY, GASTROSCOPY, DUODENOSCOPY (EGD);  Surgeon: Santi Rosas MD;  Location:  GI     ESOPHAGOSCOPY, GASTROSCOPY, DUODENOSCOPY (EGD), COMBINED N/A 11/17/2017    Procedure: COMBINED ESOPHAGOSCOPY, GASTROSCOPY, DUODENOSCOPY (EGD);  EGD;  Surgeon: Santi Rosas MD;  Location:  GI     ESOPHAGOSCOPY, GASTROSCOPY, DUODENOSCOPY (EGD), COMBINED N/A 12/28/2018    Procedure: EGD;  Surgeon: Santi Rosas MD;  Location:  OR     HEAD & NECK SURGERY      12/2017 at [facility].      IMPLANT GOLD WEIGHT EYELID Right 11/16/2017    Procedure: IMPLANT WEIGHT EYELID;  Right Upper Eyelid Weight, right tarsal strip lower eyelid;  Surgeon: Milana Malave MD;  Location:  OR     IR CHEMO EMBOLIZATION  1/22/2019     KNEE SURGERY Left      ORTHOPEDIC SURGERY       PAROTIDECTOMY, RADICAL NECK DISSECTION Right 11/2/2017    Procedure: PAROTIDECTOMY, RADICAL NECK DISSECTION;  Right Superfacial Parotidectomy , Facial nerve repair. with [facility] facial nerve monitor.;  Surgeon: Asiya Morgan MD;  Location: [facility] OR     PICC INSERTION Left 11/06/2017    4fr SL BioFlo PICC, 44cm in the L basilic vein w/ tip in the low SVC      VASCULAR SURGERY            Medications  Prior to Admission medications    Medication Sig Start Date End Date Taking? Authorizing Provider   ACCU-CHEK EDINSON PLUS test strip USE TO TEST BLOOD SUGARS FOUR TIMES DAILY OR AS DIRECTED 11/13/18  Yes Natalie Chun MD   alpha-lipoic acid 600 MG capsule Take 1 capsule (600 mg) by mouth daily 4/22/19  Yes Jennifer Wilcox MD   Artificial Tear Solution (SM ARTIFICIAL TEARS) SOLN Place 1 drop into the right eye every hour as needed Apply at least 4 times daily and as needed for dry eye 7/2/18  Yes Milana Malave MD   aspirin (ASA) 81 MG EC tablet Take 1 tablet (81 mg) by mouth daily 5/30/19  Yes Brenda Delgadillo MD   BD VIKTORIA U/F 32G X 4 MM insulin pen needle Use 5 per day 4/11/18  Yes Jennifer Wilcox MD   blood glucose monitoring (ACCU-CHEK EDINSON PLUS) test strip USE TO TEST BLOOD SUGARS FOUR TIMES DAILY OR AS DIRECTED 12/15/18  Yes Jennifer Wilcox MD   blood glucose monitoring (ACCU-CHEK EDINSON PLUS) test strip USE TO TEST BLOOD SUGARS FOUR TIMES DAILY OR AS DIRECTED 9/18/18  Yes Jennifer Wilcox MD   blood glucose monitoring (ACCU-CHEK EDINSON PLUS) test strip Use to test blood sugar 4 times daily 10/13/17  Yes Natalie Chnu MD   blood glucose monitoring (ACCU-CHEK FASTCLIX) lancets Use to test blood sugar 4 times daily or as directed.  1 box = 102 lancets 10/13/17  Yes Natalie Chun MD   carvedilol (COREG) 12.5 MG tablet TAKE 1 TABLET(12.5 MG) BY MOUTH TWICE DAILY WITH MEALS 10/3/18  Yes Natalie Chun MD   Cholecalciferol (VITAMIN D-3 PO) Take 2,000 Units by mouth every morning    Yes Reported, Patient   cyanocobalamin (RA VITAMIN B-12 TR) 1000 MCG TBCR Take 5,000 mcg by mouth every morning  3/14/16  Yes Reported, Patient   dapagliflozin (FARXIGA) 10 MG TABS tablet Take 1 tablet (10 mg) by mouth daily 8/23/18  Yes Jennifer Wilcox MD    diclofenac (VOLTAREN) 1 % topical gel Place 2 g onto the skin 4 times daily 3/18/19  Yes Natalie Chun MD   econazole nitrate 1 % external cream APPLY TOPICALLY DAILY TO FEET AND TOENAILS 6/28/19  Yes Lamin Gonzalez DPM   ferrous sulfate (FEROSUL) 325 (65 Fe) MG tablet Take 325 mg by mouth 3 times daily (with meals)    Yes Reported, Patient   insulin degludec (TRESIBA) 200 UNIT/ML pen Take 100 units daily.  Patient taking differently: Take 90 units daily. 7/26/18  Yes Jennifer Wilcox MD   insulin pen needle (BD VIKTORIA U/F) 32G X 4 MM miscellaneous USE 5 PER DAY 6/4/19  Yes Jennifer Wilcox MD   lactulose (CHRONULAC) 10 GM/15ML solution Take 45 mLs (30 g) by mouth 4 times daily 12/27/18  Yes Santi Rosas MD   metFORMIN (GLUCOPHAGE-XR) 500 MG 24 hr tablet Take 1 tablet (500 mg) by mouth daily (with breakfast) 3/4/19  Yes Jennifer Wilcox MD   Multiple Vitamin (THERAVITE PO) Take 1 tablet by mouth every morning  3/14/16  Yes Reported, Patient   NOVOLOG FLEXPEN 100 UNIT/ML soln INJECT 1 UNIT PER 4 GRAMS OF CARBS AT MEALS AND SNACKS. CORRECTION SCALE OF 1 UNITS PER 25 OVER [address]. AVE DOSE 75 UNITERS PER DAY 11/21/18  Yes Natalie Chun MD   omeprazole (PRILOSEC) 40 MG DR capsule TAKE 1 CAPSULE(40 MG) BY MOUTH DAILY 6/14/19  Yes Natalie Chun MD   potassium chloride ER (K-DUR/KLOR-CON M) 20 MEQ CR tablet Take 1 tablet (20 mEq) by mouth daily 3/18/19  Yes Natalie Chun MD   rifaximin (XIFAXAN) 550 MG TABS tablet TAKE 1 TABLET(550 MG) BY MOUTH TWICE DAILY 2/19/19  Yes Santi Rosas MD   rosuvastatin (CRESTOR) 20 MG tablet Take 1 tablet (20 mg) by mouth daily 4/1/19 9/28/19 Yes Santi Rosas MD   tamsulosin (FLOMAX) 0.4 MG capsule TAKE 1 CAPSULE(0.4 MG) BY MOUTH DAILY 6/12/19  Yes Natalie Chun MD   traZODone (DESYREL) 50 MG tablet Take 1 tablet (50 mg) by mouth At Bedtime 6/7/19   "Yes Natalie Chun MD   furosemide (LASIX) 20 MG tablet Take 1 tablet (20 mg) by mouth daily 4/1/19 6/30/19  Santi Rosas MD   spironolactone (ALDACTONE) 50 MG tablet Take 1 tablet (50 mg) by mouth daily 4/1/19 6/30/19  Sanit Rosas MD       Allergies  Allergies   Allergen Reactions     Codeine Other (See Comments)     Cannot take due to liver  Cannot tolerate oral narcotics     Seasonal Allergies      Sneezing, coughing, runny and itchy eyes       Review of systems  A 10-point review of systems was negative    Examination  /72 (BP Location: Right arm, Patient Position: Sitting, Cuff Size: Adult Regular)   Pulse 70   Temp 97.8  F (36.6  C) (Oral)   Ht 1.753 m (5' 9\")   Wt 83.5 kg (184 lb)   SpO2 99%   BMI 27.17 kg/m    Body mass index is 27.17 kg/m .    Gen- well, NAD, A+Ox3, normal color  CVS- RRR  RS- normal air entry, no crackles or wheeze  Abd- central obesity, soft, non-tender, no ascites or organomegaly on palpation or percussion, BS+  Extr- hands normal, no LEILA  Skin- no rash or jaundice  Neuro- no asterixis  Psych- normal mood    Laboratory  Lab Results   Component Value Date     07/08/2019    POTASSIUM 3.6 07/08/2019    CHLORIDE 110 07/08/2019    CO2 24 07/08/2019    BUN 27 07/08/2019    CR 1.25 07/08/2019       Lab Results   Component Value Date    BILITOTAL 1.1 07/08/2019    ALT 50 07/08/2019    AST 47 07/08/2019    ALKPHOS 138 07/08/2019       Lab Results   Component Value Date    ALBUMIN 3.0 07/08/2019    PROTTOTAL 7.2 07/08/2019        Lab Results   Component Value Date    WBC 3.2 07/22/2019    HGB 12.2 07/22/2019     07/22/2019    PLT 34 07/22/2019       Lab Results   Component Value Date    INR 1.31 07/22/2019       Radiology  MRI liver 7/18/19 reviewed    Assessment  54 year old male listed for liver transplant for decompensated ESTRADA cirrhosis complicated with ascites, hepatic encephalopathy and HCC.  MELD-Na= 13 (stable).  Ascites " well-controlled on current diuretics.  Pleural effusion is asymptomatic.  Hepatic encephalopathy adequately controlled on current medications.  Renal function improved since acute kidney injury in Spring.  HCC within Balfour criteria with MELD update expected in October.  Up to date with surveillance of esophageal varices.    Plan  1.  Low Na diet  2.  Increase exercise  3.  Continue lactulose and rifaximin  4.  Continue diuretics at current doses  5.  Review MRI in liver tumor conference  6.  Follow-up in 3 months    Santi Banuelos MD  Hepatology  [facility]

## 2019-07-22 NOTE — NURSING NOTE
"Oncology Rooming Note    July 22, 2019 2:50 PM   Frandy Workman is a 54 year old male who presents for:    Chief Complaint   Patient presents with     Oncology Clinic Visit     Return visit related to Cirrhosis of Liver     Initial Vitals: /72 (BP Location: Right arm, Patient Position: Sitting, Cuff Size: Adult Large)   Pulse 70   Temp 97.8  F (36.6  C) (Oral)   Ht 1.753 m (5' 9.02\")   Wt 83.5 kg (184 lb 1.4 oz)   SpO2 99%   BMI 27.17 kg/m   Estimated body mass index is 27.17 kg/m  as calculated from the following:    Height as of this encounter: 1.753 m (5' 9.02\").    Weight as of this encounter: 83.5 kg (184 lb 1.4 oz). Body surface area is 2.02 meters squared.  Moderate Pain (5) Comment: Data Unavailable   No LMP for male patient.  Allergies reviewed: Yes  Medications reviewed: Yes    Medications: Medication refills not needed today.  Pharmacy name entered into [employer]:    [id number] - [place], [place] - [address] [id number] - [place], [place] - [address]    Clinical concerns: No new concerns. Provider was notified.      Raquel Kerr LPN            "

## 2019-07-22 NOTE — LETTER
7/22/2019      RE: Frandy Workman  [address] [facility] [address]       [facility]    Hepatology follow-up    Follow-up visit for cirrhosis    Subjective:  54 year old male    Cirrhosis  - dx 2013  - ESTRADA, A1-AT phenotype- PiMZ  - hx HE  - hx ascites  - no hx variceal bleed  - last EGD Dec 2018- small EV, portal hypertensive gastropathy  - HCC screening- see below     HCC  - dx Dec 2018  - MRI liver 12/23/18- 3.1cm lesion segment IVB, 1.1cm lesion segment III  - AFP 12/28/18= 5.2  - bone scan 1/4/19  - CT chest 1/4/19  - TACE 1/22/19 (seg IV)  - Microwave ablation 1/23/19 (seg III)  - last MRI liver 7/18/19   - last AFP 7/8/19= 4.3    Patient comes to clinic this afternoon for follow-up of cirrhosis.  Last clinic visit April 2019.  Patient saw nephrology earlier this month for acute on chronic kidney disease.  He has continued to get intravitreal injections for diabetic macular edema.  Patient denies new medications, ER visits or hospital admissions since last clinic visit.    Patient is well today.  He continues to have fatigue.  He denies any symptoms specific to liver disease.    Patient continues to take lactulose (BID) and rifaximin with 2-3 bowel movements per day.  He denies any overt confusion or lethargy.    Patient denies jaundice, lower extremity edema or abdominal distension.    Patient denies chest pain or dyspnea.  He occasionally has a cough at night.    Patient denies melena, hematemesis or hematochezia.    Patient denies fevers, sweats or chills.      Weight stable.  Appetite is good.    Blood sugars run between  in the morning and 120 in the evening.      Patient does not drink alcohol.  He has no trips planned this summer.      Medical hx Surgical hx   Past Medical History:   Diagnosis Date     Anemia 2013    Low blood plates current is 37     Arthritis      BPH (benign prostatic hyperplasia)      CAD (coronary artery disease) 4/1/2019     Cholelithiasis       Conductive hearing loss 8/16/2017    Have a lump on my right side of my face.  Had wax discharge     Depressive disorder 1986    Suffer effects throughout life     Gastroesophageal reflux disease 12/1/2014    Being treated with Prilosac     HCC (hepatocellular carcinoma) (H) 1/22/2019     History of diabetic retinopathy      HTN (hypertension)      Hyperlipidemia      Liver cirrhosis secondary to ESTRADA (H)      Portal vein thrombosis 4/11/2019     Type II diabetes mellitus (H)     Insulin adminstered BID daily.       Past Surgical History:   Procedure Laterality Date     COLONOSCOPY      2015     CV HEART CATHETERIZATION WITH POSSIBLE INTERVENTION N/A 2/26/2019    Procedure: CORS;  Surgeon: Jagdish Hoyt MD;  Location:  HEART CARDIAC CATH LAB     ESOPHAGOSCOPY, GASTROSCOPY, DUODENOSCOPY (EGD), COMBINED N/A 11/17/2016    Procedure: COMBINED ESOPHAGOSCOPY, GASTROSCOPY, DUODENOSCOPY (EGD);  Surgeon: Santi Rosas MD;  Location:  GI     ESOPHAGOSCOPY, GASTROSCOPY, DUODENOSCOPY (EGD), COMBINED N/A 11/17/2017    Procedure: COMBINED ESOPHAGOSCOPY, GASTROSCOPY, DUODENOSCOPY (EGD);  EGD;  Surgeon: Santi Rosas MD;  Location:  GI     ESOPHAGOSCOPY, GASTROSCOPY, DUODENOSCOPY (EGD), COMBINED N/A 12/28/2018    Procedure: EGD;  Surgeon: Santi Rosas MD;  Location:  OR     HEAD & NECK SURGERY      12/2017 at [facility].      IMPLANT GOLD WEIGHT EYELID Right 11/16/2017    Procedure: IMPLANT WEIGHT EYELID;  Right Upper Eyelid Weight, right tarsal strip lower eyelid;  Surgeon: Milana Malave MD;  Location:  OR     IR CHEMO EMBOLIZATION  1/22/2019     KNEE SURGERY Left      ORTHOPEDIC SURGERY       PAROTIDECTOMY, RADICAL NECK DISSECTION Right 11/2/2017    Procedure: PAROTIDECTOMY, RADICAL NECK DISSECTION;  Right Superfacial Parotidectomy , Facial nerve repair. with [facility] facial nerve monitor.;  Surgeon: Asiya Morgan MD;  Location: [facility] OR     PICC INSERTION Left 11/06/2017    4fr  SL BioFlo PICC, 44cm in the L basilic vein w/ tip in the low SVC     VASCULAR SURGERY            Medications  Prior to Admission medications    Medication Sig Start Date End Date Taking? Authorizing Provider   ACCU-CHEK EDINSON PLUS test strip USE TO TEST BLOOD SUGARS FOUR TIMES DAILY OR AS DIRECTED 11/13/18  Yes Natalie Chun MD   alpha-lipoic acid 600 MG capsule Take 1 capsule (600 mg) by mouth daily 4/22/19  Yes Jennifer Wilcox MD   Artificial Tear Solution (SM ARTIFICIAL TEARS) SOLN Place 1 drop into the right eye every hour as needed Apply at least 4 times daily and as needed for dry eye 7/2/18  Yes Milana Malave MD   aspirin (ASA) 81 MG EC tablet Take 1 tablet (81 mg) by mouth daily 5/30/19  Yes Brenda Delgadillo MD   BD VIKTORIA U/F 32G X 4 MM insulin pen needle Use 5 per day 4/11/18  Yes Jennifer Wilcox MD   blood glucose monitoring (ACCU-CHEK EDINSON PLUS) test strip USE TO TEST BLOOD SUGARS FOUR TIMES DAILY OR AS DIRECTED 12/15/18  Yes Jennifer Wilcox MD   blood glucose monitoring (ACCU-CHEK EDINSON PLUS) test strip USE TO TEST BLOOD SUGARS FOUR TIMES DAILY OR AS DIRECTED 9/18/18  Yes Jennifer Wilcox MD   blood glucose monitoring (ACCU-CHEK EDINSON PLUS) test strip Use to test blood sugar 4 times daily 10/13/17  Yes Natalie Chun MD   blood glucose monitoring (ACCU-CHEK FASTCLIX) lancets Use to test blood sugar 4 times daily or as directed.  1 box = 102 lancets 10/13/17  Yes Natalie Chun MD   carvedilol (COREG) 12.5 MG tablet TAKE 1 TABLET(12.5 MG) BY MOUTH TWICE DAILY WITH MEALS 10/3/18  Yes Natalie Chun MD   Cholecalciferol (VITAMIN D-3 PO) Take 2,000 Units by mouth every morning    Yes Reported, Patient   cyanocobalamin (RA VITAMIN B-12 TR) 1000 MCG TBCR Take 5,000 mcg by mouth every morning  3/14/16  Yes Reported, Patient   dapagliflozin (FARXIGA) 10 MG TABS tablet Take 1 tablet (10 mg)  by mouth daily 8/23/18  Yes Jennifer Wilcox MD   diclofenac (VOLTAREN) 1 % topical gel Place 2 g onto the skin 4 times daily 3/18/19  Yes Natalie Chun MD   econazole nitrate 1 % external cream APPLY TOPICALLY DAILY TO FEET AND TOENAILS 6/28/19  Yes Lamin Gonzalez DPM   ferrous sulfate (FEROSUL) 325 (65 Fe) MG tablet Take 325 mg by mouth 3 times daily (with meals)    Yes Reported, Patient   insulin degludec (TRESIBA) 200 UNIT/ML pen Take 100 units daily.  Patient taking differently: Take 90 units daily. 7/26/18  Yes Jennifer Wilcox MD   insulin pen needle (BD VIKTORIA U/F) 32G X 4 MM miscellaneous USE 5 PER DAY 6/4/19  Yes Jennifer Wilcox MD   lactulose (CHRONULAC) 10 GM/15ML solution Take 45 mLs (30 g) by mouth 4 times daily 12/27/18  Yes Santi Rosas MD   metFORMIN (GLUCOPHAGE-XR) 500 MG 24 hr tablet Take 1 tablet (500 mg) by mouth daily (with breakfast) 3/4/19  Yes Jennifer Wilcox MD   Multiple Vitamin (THERAVITE PO) Take 1 tablet by mouth every morning  3/14/16  Yes Reported, Patient   NOVOLOG FLEXPEN 100 UNIT/ML soln INJECT 1 UNIT PER 4 GRAMS OF CARBS AT MEALS AND SNACKS. CORRECTION SCALE OF 1 UNITS PER 25 OVER [address]. AVE DOSE 75 UNITERS PER DAY 11/21/18  Yes Natalie Chun MD   omeprazole (PRILOSEC) 40 MG DR capsule TAKE 1 CAPSULE(40 MG) BY MOUTH DAILY 6/14/19  Yes Natalie Chun MD   potassium chloride ER (K-DUR/KLOR-CON M) 20 MEQ CR tablet Take 1 tablet (20 mEq) by mouth daily 3/18/19  Yes Natalie Chun MD   rifaximin (XIFAXAN) 550 MG TABS tablet TAKE 1 TABLET(550 MG) BY MOUTH TWICE DAILY 2/19/19  Yes Santi Rosas MD   rosuvastatin (CRESTOR) 20 MG tablet Take 1 tablet (20 mg) by mouth daily 4/1/19 9/28/19 Yes Santi Rosas MD   tamsulosin (FLOMAX) 0.4 MG capsule TAKE 1 CAPSULE(0.4 MG) BY MOUTH DAILY 6/12/19  Yes Natalie Chun MD   traZODone (DESYREL) 50  "MG tablet Take 1 tablet (50 mg) by mouth At Bedtime 6/7/19  Yes Natalie Chun MD   furosemide (LASIX) 20 MG tablet Take 1 tablet (20 mg) by mouth daily 4/1/19 6/30/19  Santi Rosas MD   spironolactone (ALDACTONE) 50 MG tablet Take 1 tablet (50 mg) by mouth daily 4/1/19 6/30/19  Santi Rosas MD       Allergies  Allergies   Allergen Reactions     Codeine Other (See Comments)     Cannot take due to liver  Cannot tolerate oral narcotics     Seasonal Allergies      Sneezing, coughing, runny and itchy eyes       Review of systems  A 10-point review of systems was negative    Examination  /72 (BP Location: Right arm, Patient Position: Sitting, Cuff Size: Adult Regular)   Pulse 70   Temp 97.8  F (36.6  C) (Oral)   Ht 1.753 m (5' 9\")   Wt 83.5 kg (184 lb)   SpO2 99%   BMI 27.17 kg/m     Body mass index is 27.17 kg/m .    Gen- well, NAD, A+Ox3, normal color  CVS- RRR  RS- normal air entry, no crackles or wheeze  Abd- central obesity, soft, non-tender, no ascites or organomegaly on palpation or percussion, BS+  Extr- hands normal, no LEILA  Skin- no rash or jaundice  Neuro- no asterixis  Psych- normal mood    Laboratory  Lab Results   Component Value Date     07/08/2019    POTASSIUM 3.6 07/08/2019    CHLORIDE 110 07/08/2019    CO2 24 07/08/2019    BUN 27 07/08/2019    CR 1.25 07/08/2019       Lab Results   Component Value Date    BILITOTAL 1.1 07/08/2019    ALT 50 07/08/2019    AST 47 07/08/2019    ALKPHOS 138 07/08/2019       Lab Results   Component Value Date    ALBUMIN 3.0 07/08/2019    PROTTOTAL 7.2 07/08/2019        Lab Results   Component Value Date    WBC 3.2 07/22/2019    HGB 12.2 07/22/2019     07/22/2019    PLT 34 07/22/2019       Lab Results   Component Value Date    INR 1.31 07/22/2019       Radiology  MRI liver 7/18/19 reviewed    Assessment  54 year old male listed for liver transplant for decompensated ESTRADA cirrhosis complicated with ascites, hepatic " encephalopathy and HCC.  MELD-Na= 13 (stable).  Ascites well-controlled on current diuretics.  Pleural effusion is asymptomatic.  Hepatic encephalopathy adequately controlled on current medications.  Renal function improved since acute kidney injury in [place].  HCC within Nelson criteria with MELD update expected in October.  Up to date with surveillance of esophageal varices.    Plan  1.  Low Na diet  2.  Increase exercise  3.  Continue lactulose and rifaximin  4.  Continue diuretics at current doses  5.  Review MRI in liver tumor conference  6.  Follow-up in 3 months    Santi Banuelos MD  Hepatology  [facility]

## 2019-07-22 NOTE — NURSING NOTE
"Chief Complaint   Patient presents with     RECHECK     ESTRADA cirrhosis     /72 (BP Location: Right arm, Patient Position: Sitting, Cuff Size: Adult Regular)   Pulse 70   Temp 97.8  F (36.6  C) (Oral)   Ht 1.753 m (5' 9\")   Wt 83.5 kg (184 lb)   SpO2 99%   BMI 27.17 kg/m        Ayesha Schneider CMA    7/22/2019 1:08 PM      "

## 2019-07-23 RX ORDER — TAMSULOSIN HYDROCHLORIDE 0.4 MG/1
CAPSULE ORAL
Qty: 90 CAPSULE | Refills: 0 | OUTPATIENT
Start: 2019-07-23

## 2019-07-23 NOTE — TELEPHONE ENCOUNTER
Duplicate.     tamsulosin (FLOMAX) 0.4 MG capsule 90 capsule 0 6/12/2019  No   Sig: TAKE 1 CAPSULE(0.4 MG) BY MOUTH DAILY   Sent to pharmacy as: tamsulosin (FLOMAX) 0.4 MG capsule   Class: E-Prescribe   Order: [id number]   E-Prescribing Status: Receipt confirmed by pharmacy (6/12/2019  3:03 PM CDT)     Printed original order above and faxed to pharmacy.     [facility] [address] [place] [address] [phone] (Phone)  [phone] (Fax)

## 2019-07-25 ENCOUNTER — OFFICE VISIT (OUTPATIENT)
Dept: OPHTHALMOLOGY | Facility: CLINIC | Age: 54
End: 2019-07-25
Attending: OPHTHALMOLOGY
Payer: MEDICARE

## 2019-07-25 DIAGNOSIS — E11.3213 TYPE 2 DIABETES MELLITUS WITH MILD NONPROLIFERATIVE RETINOPATHY OF BOTH EYES AND MACULAR EDEMA, UNSPECIFIED WHETHER LONG TERM INSULIN USE (H): ICD-10-CM

## 2019-07-25 DIAGNOSIS — H04.123 DRY EYES, BILATERAL: Primary | ICD-10-CM

## 2019-07-25 PROCEDURE — 68761 CLOSE TEAR DUCT OPENING: CPT | Mod: ZF,50 | Performed by: OPHTHALMOLOGY

## 2019-07-25 PROCEDURE — 92134 CPTRZ OPH DX IMG PST SGM RTA: CPT | Mod: ZF | Performed by: OPHTHALMOLOGY

## 2019-07-25 PROCEDURE — G0463 HOSPITAL OUTPT CLINIC VISIT: HCPCS | Mod: ZF,25

## 2019-07-25 ASSESSMENT — CONF VISUAL FIELD
OS_NORMAL: 1
OD_NORMAL: 1
METHOD: COUNTING FINGERS

## 2019-07-25 ASSESSMENT — REFRACTION_WEARINGRX
SPECS_TYPE: PAL
OD_SPHERE: -7.25
OS_SPHERE: -7.25
OD_CYLINDER: +0.75
OS_ADD: +2.00
OS_AXIS: 040
OS_CYLINDER: +0.75
OD_AXIS: 132
OD_ADD: +2.00

## 2019-07-25 ASSESSMENT — VISUAL ACUITY
OD_CC: 20/40
OD_CC+: -2
CORRECTION_TYPE: GLASSES
METHOD: SNELLEN - LINEAR
OS_CC: 20/30

## 2019-07-25 ASSESSMENT — EXTERNAL EXAM - RIGHT EYE: OD_EXAM: NORMAL

## 2019-07-25 ASSESSMENT — TONOMETRY
IOP_METHOD: TONOPEN
OD_IOP_MMHG: 12
OS_IOP_MMHG: 11

## 2019-07-25 ASSESSMENT — SLIT LAMP EXAM - LIDS
COMMENTS: NORMAL
COMMENTS: SMALL PAPILLOMA ALONG LL MARGIN

## 2019-07-25 ASSESSMENT — CUP TO DISC RATIO
OS_RATIO: 0.3
OD_RATIO: 0.25

## 2019-07-25 ASSESSMENT — EXTERNAL EXAM - LEFT EYE: OS_EXAM: NORMAL

## 2019-07-25 NOTE — PROGRESS NOTES
CC:  Follow up Diabetic macular edema  left eye     HPI: Frandy Workman is a  54 year old year-old patient with history of Diabetes mellitus for 24 ys . Patient on insulin.  HBA1c 6.6 08/08/18. Patient was evaluated by dr. Amezquita and sent to the retina clinic for management of Diabetic macular edema     Interval History: [facility] Stable no new flashes and floaters. Last HbA1C 6.1 on 2/11/19.    Retinal Imaging:  OCT 07/25/19   RE: resolved Diabetic macular edema  LE: resolved Diabetic macular edema    fluorescein angiography transits left eye 3-28-19  Right eye: diffuse microaneurysms, late macula leakage; moderate peripheral capillary non perfusion; significant vessel leakage in late phase, no NVD/NVE  Left eye: diffuse microaneurysms, late macula leakage; moderate peripheral capillary non perfusion;  significant vessel leakage in late phase, no NVD/NVE    Optos consistent with clinical exam    Assessment & Plan:  1.  Severe nonproliferative diabetic retinopathy of both eyes    - Blood pressure (<120/80) and blood glucose (HbA1c <7.0) control discussed with patient. Patient advised  that failure to adequately control each may lead to vision loss. The patient expressed understanding.    2. Diabetic macular edema both eyes    - status post avastin x 4. Switch to Eylea last eye examination with improvement of Diabetic macular edema    - now with resolution of Diabetic macular edema.   - recommend to extend inj to 6-7 weeks. Last inj 4 weeks ago. Patient will return in 3 weeks with Optical Coherence Tomography and possible inj           3. Myopia, bilateral  Prescription given last yuval     4. Senile nuclear sclerosis, bilateral  Comment: Not visually significant OU  Plan:  No treatment indicated. Monitor annually.    5. Dry eye syndrome   Left eye worse than right eye   warm compresses and artificial tears  As needed  Plan for punctal plugs today left eye     Plan: follow up 3 weeks for inj only both eyes and with  Optical Coherence Tomography   Could consider T&E     ~~~~~~~~~~~~~~~~~~~~~~~~~~~~~~~~~~   Complete documentation of historical and exam elements from today's encounter can be found in the full encounter summary report (not reduplicated in this progress note).  I personally obtained the chief complaint(s) and history of present illness.  I confirmed and edited as necessary the review of systems, past medical/surgical history, family history, social history, and examination findings as documented by others; and I examined the patient myself.  I personally reviewed the relevant tests, images, and reports as documented above.  I personally reviewed the ophthalmic test(s) associated with this encounter, agree with the interpretation(s) as documented by the resident/fellow, and have edited the corresponding report(s) as necessary.   I formulated and edited as necessary the assessment and plan and discussed the findings and management plan with the patient and family and No resident or fellow assisted with the procedures performed.  I performed the procedures myself.    Enriqueta Martin MD   of Ophthalmology.  Retina Service   Department of Ophthalmology and Visual Neurosciences   [facility]  Phone: [phone]   Fax: [phone]

## 2019-07-26 ENCOUNTER — DOCUMENTATION ONLY (OUTPATIENT)
Dept: TRANSPLANT | Facility: CLINIC | Age: 54
End: 2019-07-26

## 2019-07-26 NOTE — Clinical Note
The AUA Symptom Score Tool    Symptom Score   1. Incomplete Emptying 1   2. Frequency 1   3. Intermittency 2   4. Urgency 2   5. Weak Stream 2   6. Straining 1   7. Nocturia 2     Total  I-PSS Score:     11   Quality of Life Due to Urinary Symptoms   0 Delighted  1 Pleased  2 Mostly Satisfied  3 Mixed  4 Mostly Dissatisfied  5 Unhappy  6 Terrible 3     Score:   1-7 Mild Symptoms  8-19 Moderate Symptoms  10-35 Severe Symptoms    Developed by: American [employer] Association Measurement Committee                                  FYI only

## 2019-07-28 DIAGNOSIS — B35.3 TINEA PEDIS OF BOTH FEET: ICD-10-CM

## 2019-07-29 NOTE — PROGRESS NOTES
7/18/19  1.) treatment changes x 2  2.) seg 3 lesion with marginal enhancement that could be treatment changes or possible tumor    Recs:  - 3 mo follow up images

## 2019-07-31 RX ORDER — ECONAZOLE NITRATE 10 MG/G
CREAM TOPICAL DAILY
Qty: 85 G | Refills: 0 | Status: SHIPPED | OUTPATIENT
Start: 2019-07-31 | End: 2020-07-09

## 2019-08-08 ASSESSMENT — ENCOUNTER SYMPTOMS
COUGH DISTURBING SLEEP: 1
MYALGIAS: 1
CHILLS: 0
EYE IRRITATION: 1
WEIGHT LOSS: 0
MUSCLE CRAMPS: 0
NECK MASS: 0
POSTURAL DYSPNEA: 0
NAUSEA: 0
MUSCLE WEAKNESS: 1
SINUS PAIN: 1
NIGHT SWEATS: 1
DECREASED APPETITE: 0
FATIGUE: 1
DOUBLE VISION: 0
RECTAL PAIN: 0
VOMITING: 0
COUGH: 1
POLYPHAGIA: 1
EYE REDNESS: 0
WHEEZING: 0
SMELL DISTURBANCE: 0
DYSPNEA ON EXERTION: 1
BRUISES/BLEEDS EASILY: 1
HOARSE VOICE: 0
WEIGHT GAIN: 1
HEARTBURN: 0
JOINT SWELLING: 0
TASTE DISTURBANCE: 0
HALLUCINATIONS: 0
SNORES LOUDLY: 0
SINUS CONGESTION: 1
BLOOD IN STOOL: 0
JAUNDICE: 0
SPUTUM PRODUCTION: 0
HEMOPTYSIS: 0
NECK PAIN: 0
BLOATING: 0
BOWEL INCONTINENCE: 0
POLYDIPSIA: 1
SORE THROAT: 0
SHORTNESS OF BREATH: 1
EYE WATERING: 1
TROUBLE SWALLOWING: 0
ALTERED TEMPERATURE REGULATION: 1
STIFFNESS: 1
DIARRHEA: 1
SWOLLEN GLANDS: 0
ARTHRALGIAS: 1
INCREASED ENERGY: 1
BACK PAIN: 0
ABDOMINAL PAIN: 1
CONSTIPATION: 0
FEVER: 0
EYE PAIN: 1

## 2019-08-14 ENCOUNTER — ALLIED HEALTH/NURSE VISIT (OUTPATIENT)
Dept: OPHTHALMOLOGY | Facility: CLINIC | Age: 54
End: 2019-08-14
Attending: OPHTHALMOLOGY
Payer: MEDICARE

## 2019-08-14 ENCOUNTER — OFFICE VISIT (OUTPATIENT)
Dept: ENDOCRINOLOGY | Facility: CLINIC | Age: 54
End: 2019-08-14
Payer: MEDICARE

## 2019-08-14 DIAGNOSIS — Z79.4 TYPE 2 DIABETES MELLITUS WITHOUT COMPLICATION, WITH LONG-TERM CURRENT USE OF INSULIN (H): Primary | ICD-10-CM

## 2019-08-14 DIAGNOSIS — E11.3293 TYPE 2 DIABETES MELLITUS WITH MILD NONPROLIFERATIVE RETINOPATHY OF BOTH EYES WITHOUT MACULAR EDEMA, UNSPECIFIED WHETHER LONG TERM INSULIN USE (H): ICD-10-CM

## 2019-08-14 DIAGNOSIS — H35.81 RETINAL EDEMA: ICD-10-CM

## 2019-08-14 DIAGNOSIS — E11.3313 TYPE 2 DIABETES MELLITUS WITH MODERATE NONPROLIFERATIVE RETINOPATHY OF BOTH EYES AND MACULAR EDEMA, UNSPECIFIED WHETHER LONG TERM INSULIN USE (H): Primary | ICD-10-CM

## 2019-08-14 DIAGNOSIS — E11.9 TYPE 2 DIABETES MELLITUS WITHOUT COMPLICATION, WITH LONG-TERM CURRENT USE OF INSULIN (H): Primary | ICD-10-CM

## 2019-08-14 LAB — HBA1C MFR BLD: 5.4 % (ref 4.3–6)

## 2019-08-14 PROCEDURE — 92134 CPTRZ OPH DX IMG PST SGM RTA: CPT | Mod: ZF | Performed by: OPHTHALMOLOGY

## 2019-08-14 PROCEDURE — G0463 HOSPITAL OUTPT CLINIC VISIT: HCPCS | Mod: ZF

## 2019-08-14 ASSESSMENT — TONOMETRY
OS_IOP_MMHG: 10
IOP_METHOD: TONOPEN
OD_IOP_MMHG: 14

## 2019-08-14 ASSESSMENT — MIFFLIN-ST. JEOR: SCORE: 1658.64

## 2019-08-14 ASSESSMENT — REFRACTION_WEARINGRX
OD_ADD: +2.00
OS_SPHERE: -7.25
OS_ADD: +2.00
SPECS_TYPE: PAL
OD_CYLINDER: +0.75
OS_CYLINDER: +0.75
OS_AXIS: 040
OD_SPHERE: -7.25
OD_AXIS: 132

## 2019-08-14 ASSESSMENT — CONF VISUAL FIELD
OS_NORMAL: 1
METHOD: COUNTING FINGERS
OD_NORMAL: 1

## 2019-08-14 ASSESSMENT — CUP TO DISC RATIO
OD_RATIO: 0.25
OS_RATIO: 0.3

## 2019-08-14 ASSESSMENT — VISUAL ACUITY
OS_CC: 20/25-3/+
METHOD: SNELLEN - LINEAR
OD_CC: 20/30-2

## 2019-08-14 ASSESSMENT — EXTERNAL EXAM - RIGHT EYE: OD_EXAM: NORMAL

## 2019-08-14 ASSESSMENT — SLIT LAMP EXAM - LIDS
COMMENTS: NORMAL
COMMENTS: SMALL PAPILLOMA ALONG LL MARGIN

## 2019-08-14 ASSESSMENT — EXTERNAL EXAM - LEFT EYE: OS_EXAM: NORMAL

## 2019-08-14 ASSESSMENT — PAIN SCALES - GENERAL: PAINLEVEL: MODERATE PAIN (5)

## 2019-08-14 NOTE — PROGRESS NOTES
Endocrine clinic visit    Interval history  Has been doing well.  Has been checking his blood sugars very regularly.    Average blood sugar 109, SD 46, range .  Average blood sugar checks 2.6 times a day  He is not having typical symptoms of hypoglycemia.  He usually feels very tired and that is when he checks.  Working with transplant team, getting review in October.       Assessment/Treatment Plan:      Miller is a 54 year old male here for a follow up.    Type 2 Diabetes since early 30s with hypoglycemia and neuropathy.   A1c 5.4 with several hypoglycemia.     Regimen:   Reduce Tresiba 70 units .  Novolog 1 unit per 8 gm carb [at present 15] plus 1 unit per 30 over 120(start at meals again)   Farxiga 10 mg daily   Take orange juice for hypoglycemia    Benjamin Flash -- he does all the dose calculations from the present meter and he does not want to change it.      Barriers: Stress eating, reduced due to poor appetite.   Lack of testing and bolusing.  He is doing more testing now.  BG testing 3 times a day, with meal time and correction insulin     Diabetic neuropathy  Alpha lipoic acid 600 mg daily.     Erectile dysfunction  Sildenafil PRN    Hyperlipidemia   Pravastatin 20 mg daily   Check LDL on follow up.     Jennifer Wilcox MD  [id number]  Endocrinology Service        =================================================    Endocrinology Clinic Visit    Chief Complaint: Follow-up visit for DM     HPI:  .54 year old male with history of ESTRADA with liver cirrhosis who is seen in follow up for T2DM - uncontrolled.     He has had diabetes since 2001. He was intially started on Metformin but after diagnosis of liver failure, this was discontinued.     Neuropathy - tingling in feet bilateral - worse since last visit.   Nephropathy - none  Retinopathy - no.  He will be seeing a retina specialist for a retinal hemorrhage on the left eye.  Hypoglycemia - none    Prevention  On Statin.   No change in Bowel habits. On lactulose  I spoke with patient and message from PA Scheuermann relayed.  Future testing and f/u scheduled as ordered; reminder notices mailed.     Appetite unchanged.   Weight gain  No neck pain or difficulty swallowing.     Barriers:   Hypoglycemia d/t poor appetite, BID meals and pre dinner values are often low.   A1c is not a reliable indicator of glycemic control: Anemia, does not correlate with BG levels.      Insulin regimen  Tresiba 80 units daily  Aspart 1 units per 10 gm CHO not doing regularly  Correction unclear what he is following. [? 50 mg/dl over 100]      Allergies   Allergen Reactions     Codeine Other (See Comments)     Cannot take due to liver  Cannot tolerate oral narcotics     Seasonal Allergies      Sneezing, coughing, runny and itchy eyes       Current Outpatient Medications   Medication Sig Dispense Refill     ACCU-CHEK EDINSON PLUS test strip USE TO TEST BLOOD SUGARS FOUR TIMES DAILY OR AS DIRECTED 150 strip 11     alpha-lipoic acid 600 MG capsule Take 1 capsule (600 mg) by mouth daily 90 capsule 3     Artificial Tear Solution (SM ARTIFICIAL TEARS) SOLN Place 1 drop into the right eye every hour as needed Apply at least 4 times daily and as needed for dry eye 1 Bottle 3     aspirin (ASA) 81 MG EC tablet Take 1 tablet (81 mg) by mouth daily 90 tablet 3     BD VIKTORIA U/F 32G X 4 MM insulin pen needle Use 5 per day 300 each 3     blood glucose monitoring (ACCU-CHEK EDINSON PLUS) test strip USE TO TEST BLOOD SUGARS FOUR TIMES DAILY OR AS DIRECTED 400 strip 3     blood glucose monitoring (ACCU-CHEK EDINSON PLUS) test strip USE TO TEST BLOOD SUGARS FOUR TIMES DAILY OR AS DIRECTED 300 strip 3     blood glucose monitoring (ACCU-CHEK EDINSON PLUS) test strip Use to test blood sugar 4 times daily 400 each 3     blood glucose monitoring (ACCU-CHEK FASTCLIX) lancets Use to test blood sugar 4 times daily or as directed.  1 box = 102 lancets 408 each 3     carvedilol (COREG) 12.5 MG tablet TAKE 1 TABLET(12.5 MG) BY MOUTH TWICE DAILY WITH MEALS 180 tablet 3     Cholecalciferol (VITAMIN D-3 PO) Take 2,000 Units by mouth every morning        cyanocobalamin (RA  VITAMIN B-12 TR) 1000 MCG TBCR Take 5,000 mcg by mouth every morning        dapagliflozin (FARXIGA) 10 MG TABS tablet Take 1 tablet (10 mg) by mouth daily 90 tablet 3     diclofenac (VOLTAREN) 1 % topical gel Place 2 g onto the skin 4 times daily 100 g 3     econazole nitrate 1 % external cream Apply topically daily To feet and toenails. 85 g 0     ferrous sulfate (FEROSUL) 325 (65 Fe) MG tablet Take 325 mg by mouth 3 times daily (with meals)        insulin degludec (TRESIBA) 200 UNIT/ML pen Take 100 units daily. (Patient taking differently: Take 90 units daily.) 100 mL 3     insulin pen needle (BD VIKTORIA U/F) 32G X 4 MM miscellaneous USE 5 PER  each 2     lactulose (CHRONULAC) 10 GM/15ML solution Take 45 mLs (30 g) by mouth 4 times daily 5000 mL 11     metFORMIN (GLUCOPHAGE-XR) 500 MG 24 hr tablet Take 1 tablet (500 mg) by mouth daily (with breakfast) 90 tablet 1     Multiple Vitamin (THERAVITE PO) Take 1 tablet by mouth every morning        NOVOLOG FLEXPEN 100 UNIT/ML soln INJECT 1 UNIT PER 4 GRAMS OF CARBS AT MEALS AND SNACKS. CORRECTION SCALE OF 1 UNITS PER 25 OVER [address]. AVE DOSE 75 UNITERS PER DAY 30 mL 3     omeprazole (PRILOSEC) 40 MG DR capsule TAKE 1 CAPSULE(40 MG) BY MOUTH DAILY 90 capsule 0     potassium chloride ER (K-DUR/KLOR-CON M) 20 MEQ CR tablet Take 1 tablet (20 mEq) by mouth daily 90 tablet 3     rifaximin (XIFAXAN) 550 MG TABS tablet TAKE 1 TABLET(550 MG) BY MOUTH TWICE DAILY 60 tablet 3     rosuvastatin (CRESTOR) 20 MG tablet Take 1 tablet (20 mg) by mouth daily 60 tablet 2     tamsulosin (FLOMAX) 0.4 MG capsule TAKE 1 CAPSULE(0.4 MG) BY MOUTH DAILY 90 capsule 0     traZODone (DESYREL) 50 MG tablet Take 1 tablet (50 mg) by mouth At Bedtime 90 tablet 1     furosemide (LASIX) 20 MG tablet Take 1 tablet (20 mg) by mouth daily 90 tablet 0     spironolactone (ALDACTONE) 50 MG tablet Take 1 tablet (50 mg) by mouth daily 90 tablet 0       Review of Systems   No eye symptoms  No headache, change in  vision, loss of peripheral vision  No neck pain, no other lumps in the neck  No change in breathing    No change in swallowing.    No swelling in extremities  No change in mental status.    ROS that were obtained were negative.         Past Medical History:   Diagnosis Date     Anemia 2013    Low blood plates current is 37     Arthritis      BPH (benign prostatic hyperplasia)      CAD (coronary artery disease) 4/1/2019     Cholelithiasis      Conductive hearing loss 8/16/2017    Have a lump on my right side of my face.  Had wax discharge     Depressive disorder 1986    Suffer effects throughout life     Gastroesophageal reflux disease 12/1/2014    Being treated with Prilosac     HCC (hepatocellular carcinoma) (H) 1/22/2019     History of diabetic retinopathy 07/2018     HTN (hypertension)      Hyperlipidemia      Liver cirrhosis secondary to ESTRADA (H)      Portal vein thrombosis 4/11/2019     Type II diabetes mellitus (H)     Insulin adminstered BID daily.        Past Surgical History:   Procedure Laterality Date     COLONOSCOPY      2015     CV HEART CATHETERIZATION WITH POSSIBLE INTERVENTION N/A 2/26/2019    Procedure: CORS;  Surgeon: Jagdish Hoyt MD;  Location:  HEART CARDIAC CATH LAB     ESOPHAGOSCOPY, GASTROSCOPY, DUODENOSCOPY (EGD), COMBINED N/A 11/17/2016    Procedure: COMBINED ESOPHAGOSCOPY, GASTROSCOPY, DUODENOSCOPY (EGD);  Surgeon: Santi Rosas MD;  Location:  GI     ESOPHAGOSCOPY, GASTROSCOPY, DUODENOSCOPY (EGD), COMBINED N/A 11/17/2017    Procedure: COMBINED ESOPHAGOSCOPY, GASTROSCOPY, DUODENOSCOPY (EGD);  EGD;  Surgeon: Santi Rosas MD;  Location:  GI     ESOPHAGOSCOPY, GASTROSCOPY, DUODENOSCOPY (EGD), COMBINED N/A 12/28/2018    Procedure: EGD;  Surgeon: Santi Rosas MD;  Location:  OR     HEAD & NECK SURGERY      12/2017 at [facility].      IMPLANT GOLD WEIGHT EYELID Right 11/16/2017    Procedure: IMPLANT WEIGHT EYELID;  Right Upper Eyelid Weight, right  tarsal strip lower eyelid;  Surgeon: Milana Malave MD;  Location: [facility] OR     IR CHEMO EMBOLIZATION  2019     KNEE SURGERY Left      ORTHOPEDIC SURGERY       PAROTIDECTOMY, RADICAL NECK DISSECTION Right 2017    Procedure: PAROTIDECTOMY, RADICAL NECK DISSECTION;  Right Superfacial Parotidectomy , Facial nerve repair. with [facility] facial nerve monitor.;  Surgeon: Asiya Morgan MD;  Location: [facility] OR     PICC INSERTION Left 2017    4fr SL BioFlo PICC, 44cm in the L basilic vein w/ tip in the low SVC     VASCULAR SURGERY         Family History   Problem Relation Age of Onset     Prostate Cancer Maternal Grandfather      Substance Abuse Maternal Grandfather         Alcohol     Colon Cancer Father 60     Pancreatic Cancer Father 60     Prostate Cancer Father      Colorectal Cancer Father      Macular Degeneration Father      Cancer Father      Glaucoma Father      Skin Cancer Father      Colorectal Cancer Maternal Grandmother      Cancer Maternal Grandmother      Substance Abuse Maternal Grandmother         Alcohol     Colorectal Cancer Paternal Grandmother      Cancer Mother      Diabetes Mother          3/2016     Cerebrovascular Disease Mother         Passed away in Feb of this year, 80 years old.     Thyroid Disease Mother      Depression Mother      Asthma Sister         Had since birth     Thyroid Disease Sister      Depression Sister      Liver Disease No family hx of      Melanoma No family hx of      Social History     Socioeconomic History     Marital status:      Spouse name: Not on file     Number of children: Not on file     Years of education: Not on file     Highest education level: Not on file   Occupational History     Not on file   Social Needs     Financial resource strain: Not on file     Food insecurity:     Worry: Not on file     Inability: Not on file     Transportation needs:     Medical: Not on file     Non-medical: Not on file   Tobacco Use     Smoking status:  "Never Smoker     Smokeless tobacco: Former User     Types: Chew     Tobacco comment: 1 tin per week   Substance and Sexual Activity     Alcohol use: No     Alcohol/week: 0.0 oz     Comment: quit Sept. 1996     Drug use: No     Sexual activity: Not Currently     Partners: Female     Birth control/protection: Condom   Lifestyle     Physical activity:     Days per week: Not on file     Minutes per session: Not on file     Stress: Not on file   Relationships     Social connections:     Talks on phone: Not on file     Gets together: Not on file     Attends Denominational service: Not on file     Active member of club or organization: Not on file     Attends meetings of clubs or organizations: Not on file     Relationship status: Not on file     Intimate partner violence:     Fear of current or ex partner: Not on file     Emotionally abused: Not on file     Physically abused: Not on file     Forced sexual activity: Not on file   Other Topics Concern     Parent/sibling w/ CABG, MI or angioplasty before 65F 55M? Yes   Social History Narrative     Not on file           Objective:   BP (!) (P) 147/82   Pulse (P) 62   Ht (P) 1.753 m (5' 9\")   Wt (P) 83.3 kg (183 lb 11.2 oz)   BMI (P) 27.13 kg/m      Constitutional: no distress.   EYES: anicteric.   HEENT:  Assymmetry in parotid region, muscle wasting.   Cardiovascular: RRR, S1, S2 normal. No m/g/r   Pulmonary/Chest: CTAB. No wheezing or rales   Abdominal: +BS. Distended.   Neurological: Alert.  Extremities: No clubbing, cyanosis or inflammation   Skin: normal texture, color  Feet: Tingling +. Sensation is intact.  Due Aug 2020  Lymphatic: no cervical lymphadenopathy.  Psychological: appropriate mood     In House Labs:   Lab Results   Component Value Date    A1C 6.5 06/09/2017    A1C 7.8 10/25/2016          TSH   Date Value Ref Range Status   08/08/2018 0.49 0.40 - 4.00 mU/L Final   02/08/2018 0.71 0.40 - 4.00 mU/L Final   06/09/2017 0.42 0.40 - 4.00 mU/L Final     T4 Free "   Date Value Ref Range Status   08/08/2018 1.21 0.76 - 1.46 ng/dL Final       Creatinine   Date Value Ref Range Status   07/22/2019 1.35 (H) 0.66 - 1.25 mg/dL Final   ]    Recent Labs   Lab Test  10/25/16   1731   CHOL  164   HDL  33*   LDL  90   TRIG  205*       Answers for HPI/ROS submitted by the patient on 8/8/2019   General Symptoms: Yes  Skin Symptoms: No  HENT Symptoms: Yes  EYE SYMPTOMS: Yes  HEART SYMPTOMS: No  LUNG SYMPTOMS: Yes  INTESTINAL SYMPTOMS: Yes  URINARY SYMPTOMS: No  REPRODUCTIVE SYMPTOMS: Yes  SKELETAL SYMPTOMS: Yes  BLOOD SYMPTOMS: Yes  NERVOUS SYSTEM SYMPTOMS: No  MENTAL HEALTH SYMPTOMS: No  Fever: No  Loss of appetite: No  Weight loss: No  Weight gain: Yes  Fatigue: Yes  Night sweats: Yes  Chills: No  Increased stress: No  Excessive hunger: Yes  Excessive thirst: Yes  Feeling hot or cold when others believe the temperature is normal: Yes  Loss of height: No  Post-operative complications: No  Surgical site pain: No  Hallucinations: No  Change in or Loss of Energy: Yes  Hyperactivity: No  Confusion: No  Ear pain: No  Ear discharge: Yes  Hearing loss: No  Tinnitus: Yes  Nosebleeds: Yes  Congestion: Yes  Sinus pain: Yes  Trouble swallowing: No   Voice hoarseness: No  Mouth sores: No  Sore throat: No  Tooth pain: No  Gum tenderness: No  Bleeding gums: No  Change in taste: No  Change in sense of smell: No  Dry mouth: Yes  Hearing aid used: No  Neck lump: No  Eye pain: Yes  Vision loss: Yes  Dry eyes: Yes  Watery eyes: Yes  Eye bulging: No  Double vision: No  Flashing of lights: No  Spots: No  Floaters: No  Redness: No  Crossed eyes: No  Tunnel Vision: No  Yellowing of eyes: No  Eye irritation: Yes  Cough: Yes  Sputum or phlegm: No  Coughing up blood: No  Difficulty breating or shortness of breath: Yes  Snoring: No  Wheezing: No  Difficulty breathing on exertion: Yes  Nighttime Cough: Yes  Difficulty breathing when lying flat: No  Heart burn or indigestion: No  Nausea: No  Vomiting: No  Abdominal  pain: Yes  Bloating: No  Constipation: No  Diarrhea: Yes  Blood in stool: No  Black stools: No  Rectal or Anal pain: No  Fecal incontinence: No  Yellowing of skin or eyes: No  Vomit with blood: No  Change in stools: No  Back pain: No  Muscle aches: Yes  Neck pain: No  Swollen joints: No  Joint pain: Yes  Bone pain: Yes  Muscle cramps: No  Muscle weakness: Yes  Joint stiffness: Yes  Bone fracture: No  Anemia: Yes  Swollen glands: No  Easy bleeding or bruising: Yes  Edema or swelling: No  Scrotal pain or swelling: No  Erectile dysfunction: Yes  Penile discharge: No  Genital ulcers: No  Reduced libido: Yes

## 2019-08-14 NOTE — PROGRESS NOTES
CC:  Follow up Diabetic macular edema  left eye     HPI: Frandy Workman is a  54 year old year-old patient with history of Diabetes mellitus for 24 ys . Patient on insulin.  HBA1c 6.6 08/08/18. Patient was evaluated by dr. Amezquita and sent to the retina clinic for management of Diabetic macular edema     Interval History: VA Stable no new flashes and floaters. Last HbA1C 6.1 on 2/11/19.    Retinal Imaging:  OCT 08/14/19   RE: resolved Diabetic macular edema  LE: resolved Diabetic macular edema    fluorescein angiography transits left eye 3-28-19  Right eye: diffuse microaneurysms, late macula leakage; moderate peripheral capillary non perfusion; significant vessel leakage in late phase, no NVD/NVE  Left eye: diffuse microaneurysms, late macula leakage; moderate peripheral capillary non perfusion;  significant vessel leakage in late phase, no NVD/NVE    Optos consistent with clinical exam    Assessment & Plan:  1.  Severe nonproliferative diabetic retinopathy of both eyes    - Blood pressure (<120/80) and blood glucose (HbA1c <7.0) control discussed with patient. Patient advised  that failure to adequately control each may lead to vision loss. The patient expressed understanding.    2. Diabetic macular edema both eyes    - status post avastin x 4. Switch to Eylea 4/24/19 (s/p 3 injections) with improvement of Diabetic macular edema    - now with resolution of Diabetic macular edema.   - Last Eylea injection was 6/27/19 (7 weeks)   - patient has not had recurrence with/ Eylea   - extend inj interval further. Today is 7 weeks. Recommed to extend another 3 weeks. Last inj 7 weeks ago. Patient will return in 3 weeks with Optical Coherence Tomography and possible inj OU          3. Myopia, bilateral  Prescription given last yuval     4. Senile nuclear sclerosis, bilateral  Comment: Not visually significant OU  Plan:  No treatment indicated. Monitor annually.    5. Dry eye syndrome   Left eye worse than right eye   warm  compresses and artificial tears  As needed  Plan for punctal plugs today left eye     Plan: follow up 3 weeks for Optical Coherence Tomography both eyes and fluorescein angiography transits right eye  and possible Eylea PETERSON Ayon MD PGY3 Ophthalmology Resident    ~~~~~~~~~~~~~~~~~~~~~~~~~~~~~~~~~~   Complete documentation of historical and exam elements from today's encounter can be found in the full encounter summary report (not reduplicated in this progress note).  I personally obtained the chief complaint(s) and history of present illness.  I confirmed and edited as necessary the review of systems, past medical/surgical history, family history, social history, and examination findings as documented by others; and I examined the patient myself.  I personally reviewed the relevant tests, images, and reports as documented above.  I personally reviewed the ophthalmic test(s) associated with this encounter, agree with the interpretation(s) as documented by the resident/fellow, and have edited the corresponding report(s) as necessary.   I formulated and edited as necessary the assessment and plan and discussed the findings and management plan with the patient and family    Enriqueta Martin MD   of Ophthalmology.  Retina Service   Department of Ophthalmology and Visual Neurosciences   [facility]  Phone: [phone]   Fax: [phone]

## 2019-08-14 NOTE — LETTER
8/14/2019       RE: Frandy Workman  [address]  [facility] [address]     Dear Colleague,    Thank you for referring your patient, Frandy Workman, to the [facility] ENDOCRINOLOGY at [facility]. Please see a copy of my visit note below.    Endocrine clinic visit    Interval history  Has been doing well.  Has been checking his blood sugars very regularly.    Average blood sugar 109, SD 46, range .  Average blood sugar checks 2.6 times a day  He is not having typical symptoms of hypoglycemia.  He usually feels very tired and that is when he checks.  Working with transplant team, getting review in October.       Assessment/Treatment Plan:      Miller is a 54 year old male here for a follow up.    Type 2 Diabetes since early 30s with hypoglycemia and neuropathy.   A1c 5.4 with several hypoglycemia.     Regimen:   Reduce Tresiba 70 units .  Novolog 1 unit per 8 gm carb [at present 15] plus 1 unit per 30 over 120(start at meals again)   Farxiga 10 mg daily   Take orange juice for hypoglycemia    Benjamin Flash -- he does all the dose calculations from the present meter and he does not want to change it.      Barriers: Stress eating, reduced due to poor appetite.   Lack of testing and bolusing.  He is doing more testing now.  BG testing 3 times a day, with meal time and correction insulin     Diabetic neuropathy  Alpha lipoic acid 600 mg daily.     Erectile dysfunction  Sildenafil PRN    Hyperlipidemia   Pravastatin 20 mg daily   Check LDL on follow up.     Jennifer Wilcox MD  0008  Endocrinology Service        =================================================    Endocrinology Clinic Visit    Chief Complaint: Follow-up visit for DM     HPI:  .54 year old male with history of ESTRADA with liver cirrhosis who is seen in follow up for T2DM - uncontrolled.     He has had diabetes since 2001. He was intially started on Metformin but after diagnosis of liver failure, this was discontinued.      Neuropathy - tingling in feet bilateral - worse since last visit.   Nephropathy - none  Retinopathy - no.  He will be seeing a retina specialist for a retinal hemorrhage on the left eye.  Hypoglycemia - none    Prevention  On Statin.   No change in Bowel habits. On lactulose   Appetite unchanged.   Weight gain  No neck pain or difficulty swallowing.     Barriers:   Hypoglycemia d/t poor appetite, BID meals and pre dinner values are often low.   A1c is not a reliable indicator of glycemic control: Anemia, does not correlate with BG levels.      Insulin regimen  Tresiba 80 units daily  Aspart 1 units per 10 gm CHO not doing regularly  Correction unclear what he is following. [? 50 mg/dl over 100]      Allergies   Allergen Reactions     Codeine Other (See Comments)     Cannot take due to liver  Cannot tolerate oral narcotics     Seasonal Allergies      Sneezing, coughing, runny and itchy eyes       Current Outpatient Medications   Medication Sig Dispense Refill     ACCU-CHEK EDINSON PLUS test strip USE TO TEST BLOOD SUGARS FOUR TIMES DAILY OR AS DIRECTED 150 strip 11     alpha-lipoic acid 600 MG capsule Take 1 capsule (600 mg) by mouth daily 90 capsule 3     Artificial Tear Solution (SM ARTIFICIAL TEARS) SOLN Place 1 drop into the right eye every hour as needed Apply at least 4 times daily and as needed for dry eye 1 Bottle 3     aspirin (ASA) 81 MG EC tablet Take 1 tablet (81 mg) by mouth daily 90 tablet 3     BD VIKTORIA U/F 32G X 4 MM insulin pen needle Use 5 per day 300 each 3     blood glucose monitoring (ACCU-CHEK EDINSON PLUS) test strip USE TO TEST BLOOD SUGARS FOUR TIMES DAILY OR AS DIRECTED 400 strip 3     blood glucose monitoring (ACCU-CHEK EDINSON PLUS) test strip USE TO TEST BLOOD SUGARS FOUR TIMES DAILY OR AS DIRECTED 300 strip 3     blood glucose monitoring (ACCU-CHEK EDINSON PLUS) test strip Use to test blood sugar 4 times daily 400 each 3     blood glucose monitoring (ACCU-CHEK FASTCLIX) lancets Use to test  blood sugar 4 times daily or as directed.  1 box = 102 lancets 408 each 3     carvedilol (COREG) 12.5 MG tablet TAKE 1 TABLET(12.5 MG) BY MOUTH TWICE DAILY WITH MEALS 180 tablet 3     Cholecalciferol (VITAMIN D-3 PO) Take 2,000 Units by mouth every morning        cyanocobalamin (RA VITAMIN B-12 TR) 1000 MCG TBCR Take 5,000 mcg by mouth every morning        dapagliflozin (FARXIGA) 10 MG TABS tablet Take 1 tablet (10 mg) by mouth daily 90 tablet 3     diclofenac (VOLTAREN) 1 % topical gel Place 2 g onto the skin 4 times daily 100 g 3     econazole nitrate 1 % external cream Apply topically daily To feet and toenails. 85 g 0     ferrous sulfate (FEROSUL) 325 (65 Fe) MG tablet Take 325 mg by mouth 3 times daily (with meals)        insulin degludec (TRESIBA) 200 UNIT/ML pen Take 100 units daily. (Patient taking differently: Take 90 units daily.) 100 mL 3     insulin pen needle (BD VIKTORIA U/F) 32G X 4 MM miscellaneous USE 5 PER  each 2     lactulose (CHRONULAC) 10 GM/15ML solution Take 45 mLs (30 g) by mouth 4 times daily 5000 mL 11     metFORMIN (GLUCOPHAGE-XR) 500 MG 24 hr tablet Take 1 tablet (500 mg) by mouth daily (with breakfast) 90 tablet 1     Multiple Vitamin (THERAVITE PO) Take 1 tablet by mouth every morning        NOVOLOG FLEXPEN 100 UNIT/ML soln INJECT 1 UNIT PER 4 GRAMS OF CARBS AT MEALS AND SNACKS. CORRECTION SCALE OF 1 UNITS PER 25 OVER 125. AVE DOSE 75 UNITERS PER DAY 30 mL 3     omeprazole (PRILOSEC) 40 MG DR capsule TAKE 1 CAPSULE(40 MG) BY MOUTH DAILY 90 capsule 0     potassium chloride ER (K-DUR/KLOR-CON M) 20 MEQ CR tablet Take 1 tablet (20 mEq) by mouth daily 90 tablet 3     rifaximin (XIFAXAN) 550 MG TABS tablet TAKE 1 TABLET(550 MG) BY MOUTH TWICE DAILY 60 tablet 3     rosuvastatin (CRESTOR) 20 MG tablet Take 1 tablet (20 mg) by mouth daily 60 tablet 2     tamsulosin (FLOMAX) 0.4 MG capsule TAKE 1 CAPSULE(0.4 MG) BY MOUTH DAILY 90 capsule 0     traZODone (DESYREL) 50 MG tablet Take 1 tablet  (50 mg) by mouth At Bedtime 90 tablet 1     furosemide (LASIX) 20 MG tablet Take 1 tablet (20 mg) by mouth daily 90 tablet 0     spironolactone (ALDACTONE) 50 MG tablet Take 1 tablet (50 mg) by mouth daily 90 tablet 0       Review of Systems   No eye symptoms  No headache, change in vision, loss of peripheral vision  No neck pain, no other lumps in the neck  No change in breathing    No change in swallowing.    No swelling in extremities  No change in mental status.    ROS that were obtained were negative.         Past Medical History:   Diagnosis Date     Anemia 2013    Low blood plates current is 37     Arthritis      BPH (benign prostatic hyperplasia)      CAD (coronary artery disease) 4/1/2019     Cholelithiasis      Conductive hearing loss 8/16/2017    Have a lump on my right side of my face.  Had wax discharge     Depressive disorder 1986    Suffer effects throughout life     Gastroesophageal reflux disease 12/1/2014    Being treated with Prilosac     HCC (hepatocellular carcinoma) (H) 1/22/2019     History of diabetic retinopathy 07/2018     HTN (hypertension)      Hyperlipidemia      Liver cirrhosis secondary to ESTRADA (H)      Portal vein thrombosis 4/11/2019     Type II diabetes mellitus (H)     Insulin adminstered BID daily.        Past Surgical History:   Procedure Laterality Date     COLONOSCOPY      2015     CV HEART CATHETERIZATION WITH POSSIBLE INTERVENTION N/A 2/26/2019    Procedure: CORS;  Surgeon: Jagdish Hoyt MD;  Location: [facility] CARDIAC CATH LAB     ESOPHAGOSCOPY, GASTROSCOPY, DUODENOSCOPY (EGD), COMBINED N/A 11/17/2016    Procedure: COMBINED ESOPHAGOSCOPY, GASTROSCOPY, DUODENOSCOPY (EGD);  Surgeon: Santi Rosas MD;  Location:  GI     ESOPHAGOSCOPY, GASTROSCOPY, DUODENOSCOPY (EGD), COMBINED N/A 11/17/2017    Procedure: COMBINED ESOPHAGOSCOPY, GASTROSCOPY, DUODENOSCOPY (EGD);  EGD;  Surgeon: Santi Rosas MD;  Location:  GI     ESOPHAGOSCOPY, GASTROSCOPY,  DUODENOSCOPY (EGD), COMBINED N/A 2018    Procedure: EGD;  Surgeon: Santi Rosas MD;  Location:  OR     HEAD & NECK SURGERY      2017 at [facility].      IMPLANT GOLD WEIGHT EYELID Right 2017    Procedure: IMPLANT WEIGHT EYELID;  Right Upper Eyelid Weight, right tarsal strip lower eyelid;  Surgeon: Milana Malave MD;  Location: [facility] OR     IR CHEMO EMBOLIZATION  2019     KNEE SURGERY Left      ORTHOPEDIC SURGERY       PAROTIDECTOMY, RADICAL NECK DISSECTION Right 2017    Procedure: PAROTIDECTOMY, RADICAL NECK DISSECTION;  Right Superfacial Parotidectomy , Facial nerve repair. with NIM facial nerve monitor.;  Surgeon: Asiya Morgan MD;  Location: [facility] OR     PICC INSERTION Left 2017    4fr SL BioFlo PICC, 44cm in the L basilic vein w/ tip in the low SVC     VASCULAR SURGERY         Family History   Problem Relation Age of Onset     Prostate Cancer Maternal Grandfather      Substance Abuse Maternal Grandfather         Alcohol     Colon Cancer Father 60     Pancreatic Cancer Father 60     Prostate Cancer Father      Colorectal Cancer Father      Macular Degeneration Father      Cancer Father      Glaucoma Father      Skin Cancer Father      Colorectal Cancer Maternal Grandmother      Cancer Maternal Grandmother      Substance Abuse Maternal Grandmother         Alcohol     Colorectal Cancer Paternal Grandmother      Cancer Mother      Diabetes Mother          3/2016     Cerebrovascular Disease Mother         Passed away in Feb of this year, 80 years old.     Thyroid Disease Mother      Depression Mother      Asthma Sister         Had since birth     Thyroid Disease Sister      Depression Sister      Liver Disease No family hx of      Melanoma No family hx of      Social History     Socioeconomic History     Marital status:      Spouse name: Not on file     Number of children: Not on file     Years of education: Not on file     Highest education level: Not on file  "  Occupational History     Not on file   Social Needs     Financial resource strain: Not on file     Food insecurity:     Worry: Not on file     Inability: Not on file     Transportation needs:     Medical: Not on file     Non-medical: Not on file   Tobacco Use     Smoking status: Never Smoker     Smokeless tobacco: Former User     Types: Chew     Tobacco comment: 1 tin per week   Substance and Sexual Activity     Alcohol use: No     Alcohol/week: 0.0 oz     Comment: quit Sept. 1996     Drug use: No     Sexual activity: Not Currently     Partners: Female     Birth control/protection: Condom   Lifestyle     Physical activity:     Days per week: Not on file     Minutes per session: Not on file     Stress: Not on file   Relationships     Social connections:     Talks on phone: Not on file     Gets together: Not on file     Attends L[employer]-day service: Not on file     Active member of club or organization: Not on file     Attends meetings of clubs or organizations: Not on file     Relationship status: Not on file     Intimate partner violence:     Fear of current or ex partner: Not on file     Emotionally abused: Not on file     Physically abused: Not on file     Forced sexual activity: Not on file   Other Topics Concern     Parent/sibling w/ CABG, MI or angioplasty before 65F 55M? Yes   Social History Narrative     Not on file           Objective:   BP (!) (P) 147/82   Pulse (P) 62   Ht (P) 1.753 m (5' 9\")   Wt (P) 83.3 kg (183 lb 11.2 oz)   BMI (P) 27.13 kg/m       Constitutional: no distress.   EYES: anicteric.   HEENT:  Assymmetry in parotid region, muscle wasting.   Cardiovascular: RRR, S1, S2 normal. No m/g/r   Pulmonary/Chest: CTAB. No wheezing or rales   Abdominal: +BS. Distended.   Neurological: Alert.  Extremities: No clubbing, cyanosis or inflammation   Skin: normal texture, color  Feet: Tingling +. Sensation is intact.  Due Aug 2020  Lymphatic: no cervical lymphadenopathy.  Psychological: appropriate mood "     In House Labs:   Lab Results   Component Value Date    A1C 6.5 06/09/2017    A1C 7.8 10/25/2016          TSH   Date Value Ref Range Status   08/08/2018 0.49 0.40 - 4.00 mU/L Final   02/08/2018 0.71 0.40 - 4.00 mU/L Final   06/09/2017 0.42 0.40 - 4.00 mU/L Final     T4 Free   Date Value Ref Range Status   08/08/2018 1.21 0.76 - 1.46 ng/dL Final       Creatinine   Date Value Ref Range Status   07/22/2019 1.35 (H) 0.66 - 1.25 mg/dL Final   ]    Recent Labs   Lab Test  10/25/16   1731   CHOL  164   HDL  33*   LDL  90   TRIG  205*       Again, thank you for allowing me to participate in the care of your patient.      Sincerely,    Jennifer Wilcox MD

## 2019-08-16 DIAGNOSIS — K76.82 HEPATIC ENCEPHALOPATHY (H): ICD-10-CM

## 2019-08-16 RX ORDER — RIFAXIMIN 550 MG/1
TABLET ORAL
Qty: 60 TABLET | Refills: 0 | Status: SHIPPED | OUTPATIENT
Start: 2019-08-16 | End: 2019-09-21

## 2019-08-27 DIAGNOSIS — Z79.4 TYPE 2 DIABETES MELLITUS WITH HYPERGLYCEMIA, WITH LONG-TERM CURRENT USE OF INSULIN (H): ICD-10-CM

## 2019-08-27 DIAGNOSIS — E11.65 TYPE 2 DIABETES MELLITUS WITH HYPERGLYCEMIA, WITH LONG-TERM CURRENT USE OF INSULIN (H): ICD-10-CM

## 2019-08-27 DIAGNOSIS — E11.8 TYPE 2 DIABETES MELLITUS WITH COMPLICATION, UNSPECIFIED WHETHER LONG TERM INSULIN USE: ICD-10-CM

## 2019-08-28 RX ORDER — DAPAGLIFLOZIN 10 MG/1
10 TABLET, FILM COATED ORAL DAILY
Qty: 90 TABLET | Refills: 3 | Status: ON HOLD | OUTPATIENT
Start: 2019-08-28 | End: 2019-11-20

## 2019-08-28 RX ORDER — METFORMIN HCL 500 MG
500 TABLET, EXTENDED RELEASE 24 HR ORAL
Qty: 90 TABLET | Refills: 3 | Status: ON HOLD | OUTPATIENT
Start: 2019-08-28 | End: 2019-11-20

## 2019-09-05 ENCOUNTER — OFFICE VISIT (OUTPATIENT)
Dept: OPHTHALMOLOGY | Facility: CLINIC | Age: 54
End: 2019-09-05
Attending: OPHTHALMOLOGY
Payer: MEDICARE

## 2019-09-05 DIAGNOSIS — K74.60 LIVER CIRRHOSIS SECONDARY TO NASH (H): Primary | ICD-10-CM

## 2019-09-05 DIAGNOSIS — H35.81 RETINAL EDEMA: ICD-10-CM

## 2019-09-05 DIAGNOSIS — E11.3493 SEVERE NONPROLIFERATIVE DIABETIC RETINOPATHY OF BOTH EYES WITHOUT MACULAR EDEMA ASSOCIATED WITH TYPE 2 DIABETES MELLITUS (H): Primary | ICD-10-CM

## 2019-09-05 DIAGNOSIS — E11.3313 TYPE 2 DIABETES MELLITUS WITH MODERATE NONPROLIFERATIVE RETINOPATHY OF BOTH EYES AND MACULAR EDEMA, UNSPECIFIED WHETHER LONG TERM INSULIN USE (H): ICD-10-CM

## 2019-09-05 DIAGNOSIS — K75.81 LIVER CIRRHOSIS SECONDARY TO NASH (H): Primary | ICD-10-CM

## 2019-09-05 PROCEDURE — 92235 FLUORESCEIN ANGRPH MLTIFRAME: CPT | Mod: ZF | Performed by: OPHTHALMOLOGY

## 2019-09-05 PROCEDURE — G0463 HOSPITAL OUTPT CLINIC VISIT: HCPCS | Mod: ZF,25

## 2019-09-05 PROCEDURE — 92134 CPTRZ OPH DX IMG PST SGM RTA: CPT | Mod: ZF | Performed by: OPHTHALMOLOGY

## 2019-09-05 RX ORDER — FERROUS SULFATE 325(65) MG
325 TABLET ORAL
Qty: 90 TABLET | Refills: 3 | Status: ON HOLD | OUTPATIENT
Start: 2019-09-05 | End: 2019-12-17

## 2019-09-05 ASSESSMENT — VISUAL ACUITY
OD_PH_CC+: -1
OD_PH_CC: 20/30
OS_CC: 20/30
OS_PH_CC: 20/25
OD_CC: 20/40
METHOD: SNELLEN - LINEAR
OS_PH_CC+: -1
CORRECTION_TYPE: GLASSES
OD_CC+: -2

## 2019-09-05 ASSESSMENT — CONF VISUAL FIELD
OD_NORMAL: 1
OS_NORMAL: 1
METHOD: COUNTING FINGERS

## 2019-09-05 ASSESSMENT — REFRACTION_WEARINGRX
OS_SPHERE: -7.25
SPECS_TYPE: PAL
OD_ADD: +2.00
OS_AXIS: 040
OD_SPHERE: -7.25
OS_CYLINDER: +0.75
OD_AXIS: 132
OS_ADD: +2.00
OD_CYLINDER: +0.75

## 2019-09-05 ASSESSMENT — TONOMETRY
OS_IOP_MMHG: 17
OD_IOP_MMHG: 18
IOP_METHOD: TONOPEN

## 2019-09-05 ASSESSMENT — CUP TO DISC RATIO
OD_RATIO: 0.25
OS_RATIO: 0.3

## 2019-09-05 ASSESSMENT — SLIT LAMP EXAM - LIDS
COMMENTS: NORMAL
COMMENTS: SMALL PAPILLOMA ALONG LL MARGIN

## 2019-09-05 ASSESSMENT — EXTERNAL EXAM - RIGHT EYE: OD_EXAM: NORMAL

## 2019-09-05 ASSESSMENT — EXTERNAL EXAM - LEFT EYE: OS_EXAM: NORMAL

## 2019-09-05 NOTE — NURSING NOTE
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     Chief Complaint(s) and History of Present Illness(es)     Follow Up     Laterality: both eyes    Associated symptoms: eye pain (right eye, throbbign and itching), dryness and tearing.  Negative for redness.  Comments: (flashes: mild)    Comments: Type 2 diabetes mellitus with moderate nonproliferative retinopathy of both eyes and macular edema              Comments     He states that he is struggling with fall allergies.  His right eye is quite itchy and he has trouble not rubbing his eye.   He is using artificial tears, but states that they do not work very well.    His vision in th right eye seems a bit blurred since his last exam.    Marie Miguel, COT 1:33 PM  September 5, 2019

## 2019-09-05 NOTE — PROGRESS NOTES
CC:  Follow up Diabetic macular edema  left eye     HPI: Frandy Workman is a  54 year old year-old patient with history of Diabetes mellitus for 24 ys . Patient on insulin.  HBA1c 6.6 08/08/18. Patient was evaluated by dr. Amezquita and sent to the retina clinic for management of Diabetic macular edema     Interval History: VA in right eye seems worsened (he associates this w/ allergies), left eye stable. No new flashes and floaters. Last HbA1C 5.4 on 8/14/19.    Retinal Imaging:  OCT 9-5-19  RE: resolved Diabetic macular edema  LE: tiny cystic perifoveal Diabetic macular edema    fluorescein angiography transits right eye 9-5-19  Right eye: diffuse microaneurysms, late mild macular leakage; mild peripheral capillary non perfusion;  mild vessel leakage in late phase, no NVD/NVE  Left eye: diffuse microaneurysms, late mild macula leakage; mild peripheral capillary non perfusion;  mild vessel leakage in late phase, no NVD/NVE    Optos consistent with clinical exam    Assessment & Plan:  1.  Moderate nonproliferative diabetic retinopathy of both eyes    - No NV on FA today each eye - improved leakage on fluorescein angiography compared to march, 2019   - Blood pressure (<120/80) and blood glucose (HbA1c <7.0) control discussed with patient. Patient advised  that failure to adequately control each may lead to vision loss. The patient expressed understanding.    2. Diabetic macular edema both eyes    - status post avastin x 4. Switch to Eylea 4/24/19 (s/p 3 injections) with improvement of Diabetic macular edema    - now with resolution of Diabetic macular edema.   - Last Eylea injection was 6/27/19 (10 weeks)   - patient has minimal cystic edema in left eye, no recurrence in right eye with Eylea   - minimal recurrent fluid left eye today with no effect on vision, no fluid today right eye - extend inj interval further. Today is 10 weeks.           3. Myopia, bilateral  Prescription given previously     4. Senile nuclear  sclerosis, bilateral  Comment: Not visually significant OU  Plan:  No treatment indicated. Monitor annually.    5. Dry eye syndrome   Left eye worse than right eye   warm compresses and artificial tears  As needed  -punctal plugs previously left eye - reports this is better     Plan: follow up 4-6 weeks for Optical Coherence Tomography both eyes and possible Eylea each eye. Possible manifest refraction  Will consider fluorescein angiography in 4 months    Layton Ayon MD - PGY 3  Ophthalmology resident    ~~~~~~~~~~~~~~~~~~~~~~~~~~~~~~~~~~   Complete documentation of historical and exam elements from today's encounter can be found in the full encounter summary report (not reduplicated in this progress note).  I personally obtained the chief complaint(s) and history of present illness.  I confirmed and edited as necessary the review of systems, past medical/surgical history, family history, social history, and examination findings as documented by others; and I examined the patient myself.  I personally reviewed the relevant tests, images, and reports as documented above.  I personally reviewed the ophthalmic test(s) associated with this encounter, agree with the interpretation(s) as documented by the resident/fellow, and have edited the corresponding report(s) as necessary.   I formulated and edited as necessary the assessment and plan and discussed the findings and management plan with the patient and family    Enriqueta Martin MD   of Ophthalmology.  Retina Service   Department of Ophthalmology and Visual Neurosciences   [facility]  Phone: [phone]   Fax: [phone]

## 2019-09-06 ENCOUNTER — PRE VISIT (OUTPATIENT)
Dept: INTERNAL MEDICINE | Facility: CLINIC | Age: 54
End: 2019-09-06

## 2019-09-06 NOTE — TELEPHONE ENCOUNTER
[facility] PRIMARY CARE CLINIC  Dept: [phone]     Patient: Frandy Workman   : 1965  MRN: [id number]  Encounter: 19       Pre Visit Assessment    Health Maintenance Due   Topic Date Due     HIV SCREENING  1979     MEDICARE ANNUAL WELLNESS VISIT  1982     INFLUENZA VACCINE (1) 2019     Chart Reviewed for Labs needed/Health Maintenance: Yes   If labs needed, orders placed:  No labs needed ,   Lab appointment scheduled:  No    Notes:  Patient confirmed they will attend appointment:  No  Appointment rescheduled?  No      Adrian Valdez at 4:36 PM on 2019

## 2019-09-10 DIAGNOSIS — K21.9 GASTROESOPHAGEAL REFLUX DISEASE, ESOPHAGITIS PRESENCE NOT SPECIFIED: ICD-10-CM

## 2019-09-12 ENCOUNTER — OFFICE VISIT (OUTPATIENT)
Dept: ENDOCRINOLOGY | Facility: CLINIC | Age: 54
End: 2019-09-12
Payer: MEDICARE

## 2019-09-12 DIAGNOSIS — L60.2 ONYCHAUXIS: ICD-10-CM

## 2019-09-12 DIAGNOSIS — M77.8 CAPSULITIS OF RIGHT FOOT: ICD-10-CM

## 2019-09-12 DIAGNOSIS — Z76.82 AWAITING LIVER TRANSPLANT: ICD-10-CM

## 2019-09-12 DIAGNOSIS — K74.60 CIRRHOSIS OF LIVER WITH ASCITES, UNSPECIFIED HEPATIC CIRRHOSIS TYPE (H): ICD-10-CM

## 2019-09-12 DIAGNOSIS — R18.8 CIRRHOSIS OF LIVER WITH ASCITES, UNSPECIFIED HEPATIC CIRRHOSIS TYPE (H): ICD-10-CM

## 2019-09-12 DIAGNOSIS — E11.49 TYPE II OR UNSPECIFIED TYPE DIABETES MELLITUS WITH NEUROLOGICAL MANIFESTATIONS, NOT STATED AS UNCONTROLLED(250.60) (H): Primary | ICD-10-CM

## 2019-09-12 DIAGNOSIS — C22.0 HCC (HEPATOCELLULAR CARCINOMA) (H): ICD-10-CM

## 2019-09-12 DIAGNOSIS — M77.8 CAPSULITIS OF LEFT FOOT: ICD-10-CM

## 2019-09-12 LAB
AFP SERPL-MCNC: 5.6 UG/L (ref 0–8)
ALBUMIN SERPL-MCNC: 3.2 G/DL (ref 3.4–5)
BILIRUB SERPL-MCNC: 1.6 MG/DL (ref 0.2–1.3)
CREAT SERPL-MCNC: 1.17 MG/DL (ref 0.66–1.25)
GFR SERPL CREATININE-BSD FRML MDRD: 69 ML/MIN/{1.73_M2}
INR PPP: 1.31 (ref 0.86–1.14)
SODIUM SERPL-SCNC: 143 MMOL/L (ref 133–144)

## 2019-09-12 PROCEDURE — 82105 ALPHA-FETOPROTEIN SERUM: CPT | Performed by: INTERNAL MEDICINE

## 2019-09-12 NOTE — PROGRESS NOTES
Past Medical History:   Diagnosis Date     Anemia 2013    Low blood plates current is 37     Arthritis      BPH (benign prostatic hyperplasia)      CAD (coronary artery disease) 4/1/2019     Cholelithiasis      Conductive hearing loss 8/16/2017    Have a lump on my right side of my face.  Had wax discharge     Depressive disorder 1986    Suffer effects throughout life     Gastroesophageal reflux disease 12/1/2014    Being treated with Prilosac     HCC (hepatocellular carcinoma) (H) 1/22/2019     History of diabetic retinopathy 07/2018     HTN (hypertension)      Hyperlipidemia      Liver cirrhosis secondary to ESTRADA (H)      Portal vein thrombosis 4/11/2019     Type II diabetes mellitus (H)     Insulin adminstered BID daily.      Patient Active Problem List   Diagnosis     Hepatic cirrhosis (H)     Type II diabetes mellitus (H)     Bipolar affective disorder in remission (H)     Esophageal varices (H)     Nonalcoholic steatohepatitis (ESTRADA)     Brow ptosis     Paralytic lagophthalmos of right upper eyelid     Erectile dysfunction due to diseases classified elsewhere     HCC (hepatocellular carcinoma) (H)     Equivocal stress echocardiogram     Type 2 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, unspecified whether long term insulin use (H)     Abnormal findings diagnostic imaging of heart and coronary circulation     Status post coronary angiogram     CAD (coronary artery disease)     Portal vein thrombosis     Past Surgical History:   Procedure Laterality Date     COLONOSCOPY      2015     CV HEART CATHETERIZATION WITH POSSIBLE INTERVENTION N/A 2/26/2019    Procedure: CORS;  Surgeon: Jagdish Hoyt MD;  Location:  HEART CARDIAC CATH LAB     ESOPHAGOSCOPY, GASTROSCOPY, DUODENOSCOPY (EGD), COMBINED N/A 11/17/2016    Procedure: COMBINED ESOPHAGOSCOPY, GASTROSCOPY, DUODENOSCOPY (EGD);  Surgeon: Santi Rosas MD;  Location:  GI     ESOPHAGOSCOPY,  GASTROSCOPY, DUODENOSCOPY (EGD), COMBINED N/A 11/17/2017    Procedure: COMBINED ESOPHAGOSCOPY, GASTROSCOPY, DUODENOSCOPY (EGD);  EGD;  Surgeon: Santi Rosas MD;  Location: [facility] GI     ESOPHAGOSCOPY, GASTROSCOPY, DUODENOSCOPY (EGD), COMBINED N/A 12/28/2018    Procedure: EGD;  Surgeon: Santi Rosas MD;  Location: [facility] OR     HEAD & NECK SURGERY      12/2017 at [facility].      IMPLANT GOLD WEIGHT EYELID Right 11/16/2017    Procedure: IMPLANT WEIGHT EYELID;  Right Upper Eyelid Weight, right tarsal strip lower eyelid;  Surgeon: Milana Malave MD;  Location: [facility] OR     IR CHEMO EMBOLIZATION  1/22/2019     KNEE SURGERY Left      ORTHOPEDIC SURGERY       PAROTIDECTOMY, RADICAL NECK DISSECTION Right 11/2/2017    Procedure: PAROTIDECTOMY, RADICAL NECK DISSECTION;  Right Superfacial Parotidectomy , Facial nerve repair. with NIM facial nerve monitor.;  Surgeon: Asiya Morgan MD;  Location: [facility] OR     PICC INSERTION Left 11/06/2017    4fr SL BioFlo PICC, 44cm in the L basilic vein w/ tip in the low SVC     VASCULAR SURGERY       Social History     Socioeconomic History     Marital status:      Spouse name: Not on file     Number of children: Not on file     Years of education: Not on file     Highest education level: Not on file   Occupational History     Not on file   Social Needs     Financial resource strain: Not on file     Food insecurity:     Worry: Not on file     Inability: Not on file     Transportation needs:     Medical: Not on file     Non-medical: Not on file   Tobacco Use     Smoking status: Never Smoker     Smokeless tobacco: Former User     Types: Chew     Tobacco comment: 1 tin per week   Substance and Sexual Activity     Alcohol use: No     Alcohol/week: 0.0 oz     Comment: quit Sept. 1996     Drug use: No     Sexual activity: Not Currently     Partners: Female     Birth control/protection: Condom   Lifestyle     Physical activity:     Days per week: Not on file     Minutes per  session: Not on file     Stress: Not on file   Relationships     Social connections:     Talks on phone: Not on file     Gets together: Not on file     Attends Jewish service: Not on file     Active member of club or organization: Not on file     Attends meetings of clubs or organizations: Not on file     Relationship status: Not on file     Intimate partner violence:     Fear of current or ex partner: Not on file     Emotionally abused: Not on file     Physically abused: Not on file     Forced sexual activity: Not on file   Other Topics Concern     Parent/sibling w/ CABG, MI or angioplasty before 65F 55M? Yes   Social History Narrative     Not on file     Family History   Problem Relation Age of Onset     Prostate Cancer Maternal Grandfather      Substance Abuse Maternal Grandfather         Alcohol     Colon Cancer Father 60     Pancreatic Cancer Father 60     Prostate Cancer Father      Colorectal Cancer Father      Macular Degeneration Father      Cancer Father      Glaucoma Father      Skin Cancer Father      Colorectal Cancer Maternal Grandmother      Cancer Maternal Grandmother      Substance Abuse Maternal Grandmother         Alcohol     Colorectal Cancer Paternal Grandmother      Cancer Mother      Diabetes Mother          3/2016     Cerebrovascular Disease Mother         Passed away in Feb of this year, 80 years old.     Thyroid Disease Mother      Depression Mother      Asthma Sister         Had since birth     Thyroid Disease Sister      Depression Sister      Liver Disease No family hx of      Melanoma No family hx of      Lab Results   Component Value Date    A1C 6.6 2018    A1C 6.5 2017    A1C 7.8 10/25/2016     Inr            1.31               2019  A1c            5.4                2019  Lab Results   Component Value Date    WBC 3.2 2019     Lab Results   Component Value Date    RBC 3.33 2019     Lab Results   Component Value Date    HGB 12.2 2019      Lab Results   Component Value Date    HCT 36.6 07/22/2019     No components found for: MCT  Lab Results   Component Value Date     07/22/2019     Lab Results   Component Value Date    MCH 36.6 07/22/2019     Lab Results   Component Value Date    MCHC 33.3 07/22/2019     Lab Results   Component Value Date    RDW 14.1 07/22/2019     Lab Results   Component Value Date    PLT 34 07/22/2019     Last Comprehensive Metabolic Panel:  Sodium   Date Value Ref Range Status   07/22/2019 142 133 - 144 mmol/L Final     Potassium   Date Value Ref Range Status   07/22/2019 4.2 3.4 - 5.3 mmol/L Final     Chloride   Date Value Ref Range Status   07/22/2019 114 (H) 94 - 109 mmol/L Final     Carbon Dioxide   Date Value Ref Range Status   07/22/2019 25 20 - 32 mmol/L Final     Anion Gap   Date Value Ref Range Status   07/22/2019 3 3 - 14 mmol/L Final     Glucose   Date Value Ref Range Status   07/22/2019 80 70 - 99 mg/dL Final     Urea Nitrogen   Date Value Ref Range Status   07/22/2019 23 7 - 30 mg/dL Final     Creatinine   Date Value Ref Range Status   07/22/2019 1.35 (H) 0.66 - 1.25 mg/dL Final     GFR Estimate   Date Value Ref Range Status   07/22/2019 58 (L) >60 mL/min/[1.73_m2] Final     Comment:     Non  GFR Calc  Starting 12/18/2018, serum creatinine based estimated GFR (eGFR) will be   calculated using the Chronic Kidney Disease Epidemiology Collaboration   (CKD-EPI) equation.       Calcium   Date Value Ref Range Status   07/22/2019 8.7 8.5 - 10.1 mg/dL Final     SUBJECTIVE FINDINGS:  A 54-year-old male returns to clinic for diabetic foot check and toenail care.  He relates he is getting pain across the MPJs, usually at night or right away in the morning.  When he walks, it feels a little bit better.  Relates no injuries.  No specific relieving or aggravating factors.  He relates he gets numbness, tingling and neuropathy in his feet.  No ulcers or sore since we have seen him last.  He is wearing his  diabetic shoes.        OBJECTIVE FINDINGS:  DP and PT 2/4 bilaterally.  He has dorsally contracted digits 2-5 bilaterally.  There is no pain on palpation. There is no pain on range of motion bilaterally.  No gross tendon voids bilaterally.  No palpable click or shooting pain in the interspaces bilaterally.  He relates it hurts across the 3-5 MPJs when it hurts.  He has a little tightness in there today on the left.  He relates the pain is worse on the left foot than the right.  There is no erythema, no drainage, no odor, no calor bilaterally.  He has incurvated long nails 1-5 bilaterally.        ASSESSMENT/PLAN:  Onychauxis bilaterally, capsulitis MPJs with hammertoes.  He is diabetic with peripheral neuropathy.  Diagnosis and treatment options discussed with him.  I advised him on stretching exercises.  Continue the diabetic shoes.  All the nails were reduced bilaterally upon consent.  He will return to clinic and see me in about 3 months.  He relates he has been using the Voltaren gel.  He is not sure if that is helping.  I discussed diagnosis and treatment options discussed with him.

## 2019-09-12 NOTE — LETTER
9/12/2019       RE: Frandy Workman  [address] [place] [facility] [id number]     Dear Colleague,    Thank you for referring your patient, Frandy Workman, to the [facility] ENDOCRINOLOGY at [facility]. Please see a copy of my visit note below.    Past Medical History:   Diagnosis Date     Anemia 2013    Low blood plates current is 37     Arthritis      BPH (benign prostatic hyperplasia)      CAD (coronary artery disease) 4/1/2019     Cholelithiasis      Conductive hearing loss 8/16/2017    Have a lump on my right side of my face.  Had wax discharge     Depressive disorder 1986    Suffer effects throughout life     Gastroesophageal reflux disease 12/1/2014    Being treated with Prilosac     HCC (hepatocellular carcinoma) (H) 1/22/2019     History of diabetic retinopathy 07/2018     HTN (hypertension)      Hyperlipidemia      Liver cirrhosis secondary to ESTRADA (H)      Portal vein thrombosis 4/11/2019     Type II diabetes mellitus (H)     Insulin adminstered BID daily.      Patient Active Problem List   Diagnosis     Hepatic cirrhosis (H)     Type II diabetes mellitus (H)     Bipolar affective disorder in remission (H)     Esophageal varices (H)     Nonalcoholic steatohepatitis (ESTRADA)     Brow ptosis     Paralytic lagophthalmos of right upper eyelid     Erectile dysfunction due to diseases classified elsewhere     HCC (hepatocellular carcinoma) (H)     Equivocal stress echocardiogram     Type 2 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, unspecified whether long term insulin use (H)     Abnormal findings diagnostic imaging of heart and coronary circulation     Status post coronary angiogram     CAD (coronary artery disease)     Portal vein thrombosis     Past Surgical History:   Procedure Laterality Date     COLONOSCOPY      2015     CV HEART CATHETERIZATION WITH POSSIBLE INTERVENTION N/A 2/26/2019    Procedure: CORS;  Surgeon:  Jagdish Hoyt MD;  Location:  HEART CARDIAC CATH LAB     ESOPHAGOSCOPY, GASTROSCOPY, DUODENOSCOPY (EGD), COMBINED N/A 11/17/2016    Procedure: COMBINED ESOPHAGOSCOPY, GASTROSCOPY, DUODENOSCOPY (EGD);  Surgeon: Santi Rosas MD;  Location:  GI     ESOPHAGOSCOPY, GASTROSCOPY, DUODENOSCOPY (EGD), COMBINED N/A 11/17/2017    Procedure: COMBINED ESOPHAGOSCOPY, GASTROSCOPY, DUODENOSCOPY (EGD);  EGD;  Surgeon: Santi Rosas MD;  Location:  GI     ESOPHAGOSCOPY, GASTROSCOPY, DUODENOSCOPY (EGD), COMBINED N/A 12/28/2018    Procedure: EGD;  Surgeon: Santi Rosas MD;  Location:  OR     HEAD & NECK SURGERY      12/2017 at [facility].      IMPLANT GOLD WEIGHT EYELID Right 11/16/2017    Procedure: IMPLANT WEIGHT EYELID;  Right Upper Eyelid Weight, right tarsal strip lower eyelid;  Surgeon: Milana Malave MD;  Location:  OR     IR CHEMO EMBOLIZATION  1/22/2019     KNEE SURGERY Left      ORTHOPEDIC SURGERY       PAROTIDECTOMY, RADICAL NECK DISSECTION Right 11/2/2017    Procedure: PAROTIDECTOMY, RADICAL NECK DISSECTION;  Right Superfacial Parotidectomy , Facial nerve repair. with [facility] facial nerve monitor.;  Surgeon: Asiya Morgan MD;  Location: [facility] OR     PICC INSERTION Left 11/06/2017    4fr SL BioFlo PICC, 44cm in the L basilic vein w/ tip in the low SVC     VASCULAR SURGERY       Social History     Socioeconomic History     Marital status:      Spouse name: Not on file     Number of children: Not on file     Years of education: Not on file     Highest education level: Not on file   Occupational History     Not on file   Social Needs     Financial resource strain: Not on file     Food insecurity:     Worry: Not on file     Inability: Not on file     Transportation needs:     Medical: Not on file     Non-medical: Not on file   Tobacco Use     Smoking status: Never Smoker     Smokeless tobacco: Former User     Types: Chew     Tobacco comment: 1 tin per week   Substance and  Sexual Activity     Alcohol use: No     Alcohol/week: 0.0 oz     Comment: quit 1996     Drug use: No     Sexual activity: Not Currently     Partners: Female     Birth control/protection: Condom   Lifestyle     Physical activity:     Days per week: Not on file     Minutes per session: Not on file     Stress: Not on file   Relationships     Social connections:     Talks on phone: Not on file     Gets together: Not on file     Attends Hoahaoism service: Not on file     Active member of club or organization: Not on file     Attends meetings of clubs or organizations: Not on file     Relationship status: Not on file     Intimate partner violence:     Fear of current or ex partner: Not on file     Emotionally abused: Not on file     Physically abused: Not on file     Forced sexual activity: Not on file   Other Topics Concern     Parent/sibling w/ CABG, MI or angioplasty before 65F 55M? Yes   Social History Narrative     Not on file     Family History   Problem Relation Age of Onset     Prostate Cancer Maternal Grandfather      Substance Abuse Maternal Grandfather         Alcohol     Colon Cancer Father 60     Pancreatic Cancer Father 60     Prostate Cancer Father      Colorectal Cancer Father      Macular Degeneration Father      Cancer Father      Glaucoma Father      Skin Cancer Father      Colorectal Cancer Maternal Grandmother      Cancer Maternal Grandmother      Substance Abuse Maternal Grandmother         Alcohol     Colorectal Cancer Paternal Grandmother      Cancer Mother      Diabetes Mother          3/2016     Cerebrovascular Disease Mother         Passed away in Feb of this year, 80 years old.     Thyroid Disease Mother      Depression Mother      Asthma Sister         Had since birth     Thyroid Disease Sister      Depression Sister      Liver Disease No family hx of      Melanoma No family hx of      Lab Results   Component Value Date    A1C 6.6 2018    A1C 6.5 2017    A1C 7.8  10/25/2016     Inr            1.31               9/12/2019  A1c            5.4                8/14/2019  Lab Results   Component Value Date    WBC 3.2 07/22/2019     Lab Results   Component Value Date    RBC 3.33 07/22/2019     Lab Results   Component Value Date    HGB 12.2 07/22/2019     Lab Results   Component Value Date    HCT 36.6 07/22/2019     No components found for: MCT  Lab Results   Component Value Date     07/22/2019     Lab Results   Component Value Date    MCH 36.6 07/22/2019     Lab Results   Component Value Date    MCHC 33.3 07/22/2019     Lab Results   Component Value Date    RDW 14.1 07/22/2019     Lab Results   Component Value Date    PLT 34 07/22/2019     Last Comprehensive Metabolic Panel:  Sodium   Date Value Ref Range Status   07/22/2019 142 133 - 144 mmol/L Final     Potassium   Date Value Ref Range Status   07/22/2019 4.2 3.4 - 5.3 mmol/L Final     Chloride   Date Value Ref Range Status   07/22/2019 114 (H) 94 - 109 mmol/L Final     Carbon Dioxide   Date Value Ref Range Status   07/22/2019 25 20 - 32 mmol/L Final     Anion Gap   Date Value Ref Range Status   07/22/2019 3 3 - 14 mmol/L Final     Glucose   Date Value Ref Range Status   07/22/2019 80 70 - 99 mg/dL Final     Urea Nitrogen   Date Value Ref Range Status   07/22/2019 23 7 - 30 mg/dL Final     Creatinine   Date Value Ref Range Status   07/22/2019 1.35 (H) 0.66 - 1.25 mg/dL Final     GFR Estimate   Date Value Ref Range Status   07/22/2019 58 (L) >60 mL/min/[1.73_m2] Final     Comment:     Non  GFR Calc  Starting 12/18/2018, serum creatinine based estimated GFR (eGFR) will be   calculated using the Chronic Kidney Disease Epidemiology Collaboration   (CKD-EPI) equation.       Calcium   Date Value Ref Range Status   07/22/2019 8.7 8.5 - 10.1 mg/dL Final     SUBJECTIVE FINDINGS:  A 54-year-old male returns to clinic for diabetic foot check and toenail care.  He relates he is getting pain across the MPJs, usually at  night or right away in the morning.  When he walks, it feels a little bit better.  Relates no injuries.  No specific relieving or aggravating factors.  He relates he gets numbness, tingling and neuropathy in his feet.  No ulcers or sore since we have seen him last.  He is wearing his diabetic shoes.        OBJECTIVE FINDINGS:  DP and PT 2/4 bilaterally.  He has dorsally contracted digits 2-5 bilaterally.  There is no pain on palpation. There is no pain on range of motion bilaterally.  No gross tendon voids bilaterally.  No palpable click or shooting pain in the interspaces bilaterally.  He relates it hurts across the 3-5 MPJs when it hurts.  He has a little tightness in there today on the left.  He relates the pain is worse on the left foot than the right.  There is no erythema, no drainage, no odor, no calor bilaterally.  He has incurvated long nails 1-5 bilaterally.        ASSESSMENT/PLAN:  Onychauxis bilaterally, capsulitis MPJs with hammertoes.  He is diabetic with peripheral neuropathy.  Diagnosis and treatment options discussed with him.  I advised him on stretching exercises.  Continue the diabetic shoes.  All the nails were reduced bilaterally upon consent.  He will return to clinic and see me in about 3 months.  He relates he has been using the Voltaren gel.  He is not sure if that is helping.  I discussed diagnosis and treatment options discussed with him.         Again, thank you for allowing me to participate in the care of your patient.      Sincerely,    Lamin Gonzalez DPM

## 2019-09-13 RX ORDER — OMEPRAZOLE 40 MG/1
40 CAPSULE, DELAYED RELEASE ORAL DAILY
Qty: 90 CAPSULE | Refills: 2 | Status: ON HOLD | OUTPATIENT
Start: 2019-09-13 | End: 2019-12-17

## 2019-09-17 ENCOUNTER — DOCUMENTATION ONLY (OUTPATIENT)
Dept: ENDOCRINOLOGY | Facility: CLINIC | Age: 54
End: 2019-09-17

## 2019-09-17 NOTE — PROGRESS NOTES
Forms received from: Fernando     Forms were [facility] for diabetic testing supplies     Faxed Date:9/19/19

## 2019-09-18 ENCOUNTER — MEDICAL CORRESPONDENCE (OUTPATIENT)
Dept: HEALTH INFORMATION MANAGEMENT | Facility: CLINIC | Age: 54
End: 2019-09-18

## 2019-09-19 ENCOUNTER — OFFICE VISIT (OUTPATIENT)
Dept: PHARMACY | Facility: CLINIC | Age: 54
End: 2019-09-19
Payer: COMMERCIAL

## 2019-09-19 DIAGNOSIS — K76.82 HEPATIC ENCEPHALOPATHY (H): ICD-10-CM

## 2019-09-19 DIAGNOSIS — E11.8 TYPE 2 DIABETES MELLITUS WITH COMPLICATION, UNSPECIFIED WHETHER LONG TERM INSULIN USE: Primary | ICD-10-CM

## 2019-09-19 DIAGNOSIS — K75.81 NASH (NONALCOHOLIC STEATOHEPATITIS): ICD-10-CM

## 2019-09-19 PROCEDURE — 99607 MTMS BY PHARM ADDL 15 MIN: CPT | Performed by: PHARMACIST

## 2019-09-19 PROCEDURE — 99606 MTMS BY PHARM EST 15 MIN: CPT | Performed by: PHARMACIST

## 2019-09-19 NOTE — PROGRESS NOTES
SUBJECTIVE/OBJECTIVE:                Frandy Workman is a 54 year old male coming in for a follow up MTM appt to discuss diabetes.     Chief Complaint: discuss blood sugars and make a med list today.     Allergies/ADRs: Reviewed in Epic  Tobacco: History of tobacco dependence - quit 2017   Alcohol: not currently using  Caffeine: 1-3 cups/day of coffee  Activity: none, pt is disabled due to liver disease. Pt is working towards getting on the transplant list.     Medication Adherence/Access:  Patient uses pill box(es).  Per patient, misses medication 0 times per week. Rarely misses doses.     Diabetes:  Pt currently taking Farxiga 10mg QD, Metformin ER 500mg QD, Tresiba 65 units daily reduced last week, Novolog carb counting. Patient flip flops Metformin and Farxiga in the evening and morning. Currently taking Farxiga in thevening and Metformin in the morning. Pt's diarrhea has resolved.   SMBG: two times daily. Ranges (per BGM):  Date FBG/ 2hours post Dinner /2hours post   9/19 128    9/18 120 84   9/17 53 105   9/16 57 82   9/15 62 85   9/14 60 80   9/13 76 81     Date FBG/ 2hours post Dinner /2hours post   5/23 170    5/22 97 99   5/21 144 94   5/20 171 137   5/19 165 108   5/18 138 99   5/17 150 86   5/16 132 144   5/15 192 105   5/14 224 139   5/13 151 160     Patient is  experiencing hypoglycemia. Frequency of hypoglycemia? None in the past week. Symptoms of low blood sugar? Sweaty, lightheadedness.   Diet: Pt has been waking up over night snacking: Oranges, cranberry shakeel juice (5 calories per serving), other high carb snacks.   Recent symptoms of high blood sugar? none   Aspirin: Taking 81mg daily and denies side effects  A1c 8/14/19: 5.4%    ESTRADA/ Ascites: Currently Child-Clark A. Pt stopped Furosemide 20mg and Spironolactone 50mg daily due to increase creatinine.     Hepatic Encephalopathy: Pt is taking Lactulose 45mL increasing to QID due to increased fogginess lately, Rifamixin 550mg BID. Every other  month this occurs. BM: 3-5.    Today's Vitals: There were no vitals taken for this visit.    ASSESSMENT:                 Current medications were reviewed today.  Created a dosing list for him today to help with med set up.     Medication Adherence: good, no issues identified    Diabetes:  Needs improvement. Pt to move Farxiga to the morning and metformin in the evening. Farxiga in the evening may be contributing to his morning lows. A1c at 5.4%, may be reasonable to reduce Farxiga to 5mg as well, or just continue tapering Tresiba. Pt would like to stop one of his brand name medications as they are expensive. Will discuss with Endocrine.     ESTRADA/ Ascites: Pt no longer taking diuretic therapy, denies abdominal bloating. Will forward to hepatology team.     Hepatic Encephalopathy: Stable. Pt appropriately increasing lactulose.     PLAN:                Pt to...  1. Take Farxiga in the morning and Metformin in the evening.  2. Decrease Tresiba further if blood sugars return to the 50-60s in the morning.     Endocrine consider...  1. Reducing Farxiga to 5mg daily.     I spent 30 minutes with this patient today. I offer these suggestions for consideration by PCP/ Hepatology/ Endocrine. A copy of the visit note was provided to the patient's primary care provider.    Will follow up in 2 months.    The patient was given a summary of these recommendations as an after visit summary.    Donald Bradley, Aubrey  [facility] Pharmacist    Phone: [phone]

## 2019-09-19 NOTE — PATIENT INSTRUCTIONS
Recommendations from today's MTM visit:                                                      1. Move Metformin to the evening and Farxiga to the morning tomorrow.     2. If your fasting blood sugars end up in the 50-60s again in the morning, reduce your Tresiba further.     It was great to speak with you today.  I value your experience and would be very thankful for your time with providing feedback on our clinic survey. You may receive a survey via email or text message in the next few days.     Next MTM visit: 12/2/19 11:30am    To schedule another MTM appointment, please call the clinic directly or you may call the MTM scheduling line at [phone] or toll-free at [phone].     My Clinical Pharmacist's contact information:                                                      It was a pleasure talking with you today!  Please feel free to contact me with any questions or concerns you have.      Donald Bradley, PharmD  [facility] Pharmacist    Phone: [phone]

## 2019-09-20 DIAGNOSIS — K74.60 LIVER CIRRHOSIS SECONDARY TO NASH (H): ICD-10-CM

## 2019-09-20 DIAGNOSIS — C22.0 HCC (HEPATOCELLULAR CARCINOMA) (H): ICD-10-CM

## 2019-09-20 DIAGNOSIS — K75.81 LIVER CIRRHOSIS SECONDARY TO NASH (H): ICD-10-CM

## 2019-09-20 RX ORDER — SPIRONOLACTONE 50 MG/1
50 TABLET, FILM COATED ORAL DAILY
Qty: 90 TABLET | Refills: 0 | Status: SHIPPED | OUTPATIENT
Start: 2019-09-20 | End: 2019-10-01

## 2019-09-20 NOTE — PROGRESS NOTES
Reduce Tresiba further by 10% of current dose. Continue to taper Tresiba to keep the fasting BG over 100.   Reduce Novolog 1 unit per 12 gm carb plus 1 unit per 30 over 120   Farxiga 10 mg daily      Jennifer Wilcox MD  Endocrinology Service

## 2019-09-21 DIAGNOSIS — K76.82 HEPATIC ENCEPHALOPATHY (H): ICD-10-CM

## 2019-09-23 RX ORDER — RIFAXIMIN 550 MG/1
TABLET ORAL
Qty: 60 TABLET | Refills: 0 | Status: SHIPPED | OUTPATIENT
Start: 2019-09-23 | End: 2019-10-23

## 2019-09-24 DIAGNOSIS — E78.5 HYPERLIPIDEMIA, UNSPECIFIED HYPERLIPIDEMIA TYPE: ICD-10-CM

## 2019-09-24 DIAGNOSIS — E11.65 TYPE 2 DIABETES MELLITUS WITH HYPERGLYCEMIA, WITH LONG-TERM CURRENT USE OF INSULIN (H): ICD-10-CM

## 2019-09-24 DIAGNOSIS — Z79.4 TYPE 2 DIABETES MELLITUS WITH HYPERGLYCEMIA, WITH LONG-TERM CURRENT USE OF INSULIN (H): ICD-10-CM

## 2019-09-24 RX ORDER — ROSUVASTATIN CALCIUM 20 MG/1
20 TABLET, COATED ORAL DAILY
Qty: 60 TABLET | Refills: 2 | Status: ON HOLD | OUTPATIENT
Start: 2019-09-24 | End: 2019-11-20

## 2019-09-24 NOTE — TELEPHONE ENCOUNTER
Rx Authorization:  Requested Medication/ Dose 100units  Date last refill ordered: 7/26/18  Quantity ordered: 100ml  # refills: 3  Date of last clinic visit with ordering provider: 9/12/19  Date of next clinic visit with ordering provider: 1/9/20

## 2019-09-26 ENCOUNTER — TELEPHONE (OUTPATIENT)
Dept: TRANSPLANT | Facility: CLINIC | Age: 54
End: 2019-09-26

## 2019-09-26 NOTE — TELEPHONE ENCOUNTER
Following submitted for 29 HCC points starting 109/:    This is that 2nd extension for a previously approved initial HCC exception. This is a 54 year old with ESTRADA cirrhosis who had a MRI on 12/23/18 that showed a 3.1 5B and a 1.1 5A with an AFP of 5 on 12/28/18. The patient underwent TACE on 1/22/19 and MWA on 1/23/19. Follow up MRI on 7/18/19 shows no HCC and an AFP of 6 on 9/12/19. This patient has had a great response to treatment and is within [place] criteria. We are requesting the patient be granted an extension of 30 HCC MELD exception points.

## 2019-09-30 ENCOUNTER — MYC MEDICAL ADVICE (OUTPATIENT)
Dept: GASTROENTEROLOGY | Facility: CLINIC | Age: 54
End: 2019-09-30

## 2019-10-01 ENCOUNTER — OFFICE VISIT (OUTPATIENT)
Dept: INTERNAL MEDICINE | Facility: CLINIC | Age: 54
End: 2019-10-01
Payer: MEDICARE

## 2019-10-01 VITALS
DIASTOLIC BLOOD PRESSURE: 66 MMHG | BODY MASS INDEX: 26.05 KG/M2 | OXYGEN SATURATION: 98 % | RESPIRATION RATE: 16 BRPM | HEART RATE: 67 BPM | SYSTOLIC BLOOD PRESSURE: 115 MMHG | WEIGHT: 176.5 LBS

## 2019-10-01 DIAGNOSIS — K74.60 LIVER CIRRHOSIS SECONDARY TO NASH (H): ICD-10-CM

## 2019-10-01 DIAGNOSIS — N40.1 BENIGN PROSTATIC HYPERPLASIA WITH LOWER URINARY TRACT SYMPTOMS, SYMPTOM DETAILS UNSPECIFIED: ICD-10-CM

## 2019-10-01 DIAGNOSIS — I85.10 SECONDARY ESOPHAGEAL VARICES WITHOUT BLEEDING (H): ICD-10-CM

## 2019-10-01 DIAGNOSIS — Z23 NEED FOR VACCINATION FOR STREP PNEUMONIAE: Primary | ICD-10-CM

## 2019-10-01 DIAGNOSIS — K75.81 LIVER CIRRHOSIS SECONDARY TO NASH (H): ICD-10-CM

## 2019-10-01 RX ORDER — TAMSULOSIN HYDROCHLORIDE 0.4 MG/1
CAPSULE ORAL
Qty: 90 CAPSULE | Refills: 3 | Status: ON HOLD | OUTPATIENT
Start: 2019-10-01 | End: 2019-12-17

## 2019-10-01 RX ORDER — CARVEDILOL 12.5 MG/1
TABLET ORAL
Qty: 180 TABLET | Refills: 3 | Status: ON HOLD | OUTPATIENT
Start: 2019-10-01 | End: 2019-11-20

## 2019-10-01 ASSESSMENT — PAIN SCALES - GENERAL: PAINLEVEL: MODERATE PAIN (5)

## 2019-10-01 NOTE — PATIENT INSTRUCTIONS
[facility] Medication Refill Request Information:  * Please contact your pharmacy regarding ANY request for medication refills.  ** [facility] Prescription Fax = [phone]  * Please allow 3 business days for routine medication refills.  * Please allow 5 business days for controlled substance medication refills.     [facility] Test Result notification information:  *You will be notified with in 7-10 days of your appointment day regarding the results of your test.  If you are on MyChart you will be notified as soon as the provider has reviewed the results and signed off on them.    Dr. Nerissa Moe

## 2019-10-01 NOTE — NURSING NOTE
Patient received Prevnar 13 vaccine. Vaccine was given under the supervision of Dr. Bowen. See immunization list for administration details. Pt was advised to remain in [facility] lobby for 15 minutes in case of an averse reaction. Given by Eda Atkinson, EMT 4:57 PM 10/1/2019

## 2019-10-01 NOTE — PROGRESS NOTES
"[facility]  [facility] [place]   Natalie Russell MD  10/01/2019      Chief Complaint:   Recheck Medication       History of Present Illness:   Frandy Workman is a 54 year old male with a history of type 2 diabetes, liver cirrhosis secondary to ESTRADA, hepatocellular carcinoma, hypertension, and hyperlipidemia who presents alone for recheck of medication. He reports he has been \"up and down\" in general.     Type 2 diabetes: His August 2019 A1c dropped to 5.4%. He had been having some lows overnight, but notes his blood sugars are now getting to be above 90 in the mornings, noting today it was in the 120's.     Healthcare Maintenance: He is up to date on his healthcare maintenance items. His blood pressure is well-controlled. He is wondering whether or not he needs a pneumonia vaccination. He did receive one dose of Pneurmovax in 2015. He will be due for Shingrix in January 2020. He has not been exercising at all as he has been too tired.     Other concerns discussed:  1. Upcoming MRI on 10/23 to follow-up on hepatocellular cancer; active on the transplant list   2. Dr. Banuelos is managing his disability and insurance coverage   3. Eye is being treated  4. Received flu shot on Sunday     Review of Systems:   Pertinent items are noted in HPI or as in patient entered ROS below, remainder of complete ROS is negative.     Active Medications:      alpha-lipoic acid 600 MG capsule, Take 1 capsule (600 mg) by mouth daily, Disp: 90 capsule, Rfl: 3     Artificial Tear Solution (SM ARTIFICIAL TEARS) SOLN, Place 1 drop into the right eye every hour as needed Apply at least 4 times daily and as needed for dry eye, Disp: 1 Bottle, Rfl: 3     aspirin (ASA) 81 MG EC tablet, Take 1 tablet (81 mg) by mouth daily, Disp: 90 tablet, Rfl: 3     carvedilol (COREG) 12.5 MG tablet, TAKE 1 TABLET(12.5 MG) BY MOUTH TWICE DAILY WITH MEALS, Disp: 180 tablet, Rfl: 3     Cholecalciferol (VITAMIN D-3 PO), Take 2,000 Units by mouth every morning " , Disp: , Rfl:      cyanocobalamin (RA VITAMIN B-12 TR) 1000 MCG TBCR, Take 5,000 mcg by mouth every morning , Disp: , Rfl:      dapagliflozin (FARXIGA) 10 MG TABS tablet, Take 1 tablet (10 mg) by mouth daily, Disp: 90 tablet, Rfl: 3     diclofenac (VOLTAREN) 1 % topical gel, Place 2 g onto the skin 4 times daily, Disp: 100 g, Rfl: 3     econazole nitrate 1 % external cream, Apply topically daily To feet and toenails., Disp: 85 g, Rfl: 0     ferrous sulfate (FEROSUL) 325 (65 Fe) MG tablet, Take 1 tablet (325 mg) by mouth 3 times daily (with meals), Disp: 90 tablet, Rfl: 3     insulin degludec (TRESIBA) 200 UNIT/ML pen, Take 70 units daily., Disp: 100 mL, Rfl: 3     lactulose (CHRONULAC) 10 GM/15ML solution, Take 45 mLs (30 g) by mouth 4 times daily, Disp: 5000 mL, Rfl: 11     metFORMIN (GLUCOPHAGE-XR) 500 MG 24 hr tablet, Take 1 tablet (500 mg) by mouth daily (with breakfast), Disp: 90 tablet, Rfl: 3     Multiple Vitamin (THERAVITE PO), Take 1 tablet by mouth every morning , Disp: , Rfl:      NOVOLOG FLEXPEN 100 UNIT/ML soln, INJECT 1 UNIT PER 4 GRAMS OF CARBS AT MEALS AND SNACKS. CORRECTION SCALE OF 1 UNITS PER 25 OVER [address] AVE DOSE 75 UNITERS PER DAY, Disp: 30 mL, Rfl: 3     omeprazole (PRILOSEC) 40 MG DR capsule, Take 1 capsule (40 mg) by mouth daily, Disp: 90 capsule, Rfl: 2     potassium chloride ER (K-DUR/KLOR-CON M) 20 MEQ CR tablet, Take 1 tablet (20 mEq) by mouth daily, Disp: 90 tablet, Rfl: 3     rosuvastatin (CRESTOR) 20 MG tablet, Take 1 tablet (20 mg) by mouth daily, Disp: 60 tablet, Rfl: 2     tamsulosin (FLOMAX) 0.4 MG capsule, TAKE 1 CAPSULE(0.4 MG) BY MOUTH DAILY, Disp: 90 capsule, Rfl: 0     traZODone (DESYREL) 50 MG tablet, Take 1 tablet (50 mg) by mouth At Bedtime, Disp: 90 tablet, Rfl: 1     XIFAXAN 550 MG TABS tablet, TAKE 1 TABLET(550 MG) BY MOUTH TWICE DAILY, Disp: 60 tablet, Rfl: 0     furosemide (LASIX) 20 MG tablet, Take 1 tablet (20 mg) by mouth daily, Disp: 90 tablet, Rfl: 0      Allergies:   Codeine   Seasonal allergies      Past Medical History:  Anemia  Arthritis  Benign prostatic hyperplasia  Coronary artery disease  Cholelithiasis  Conductive hearing loss  Depressive disorder  Gastroesophageal reflux disease   Hepatocellular carcinoma   Diabetic retinopathy  Hypertension   Hyperlipidemia    Liver cirrhosis secondary to ESTRADA  Portal vein thrombosis  Type 2 diabetes mellitus   Bipolar affective disorder  Esophageal varices  Brow ptosis  Paralytic lagophthalmos of right upper eyelid  Erectile dysfunction  Coronary artery disease      Past Surgical History:  Heart catheterization with possible intervention - 2/26/19  Head & neck surgery - 12/2017  Implant gold weight eyelid, right - 11/16/2017  Chemo embolization - 1/22/19  Left knee surgery   Parotidectomy, radical neck dissection, right - 11/2/17  Vascular surgery     Family History:   Prostate cancer - maternal grandfather,  father  Alcohol abuse - maternal grandmother   Colon cancer - father  Pancreatic cancer - father  Macular degeneration - father  Glaucoma - father  Skin cancer - father  Colorectal cancer - maternal grandmother   Cancer - maternal grandmother   Diabetes - mother  Cerebrovascular disease - mother  Thyroid disease - mother, sister  Asthma - sister  Depression - mother, sister      Social History:   The patient is , a nonsmoker, and does not consume alcohol (quit 09/1996).      Physical Exam:   /66 (BP Location: Right arm, Patient Position: Sitting, Cuff Size: Adult Regular)   Pulse 67   Resp 16   Wt 80.1 kg (176 lb 8 oz)   SpO2 98%   BMI (P) 26.06 kg/m     General: Pleasant male, in no apparent distress  ENT: TMs normal bilaterally, oropharynx clear, mucous membranes are moist.   Neck:  No lymphadenopathy, no thyromegaly, no carotid bruits  Resp: lungs clear to ausculation bilaterally  CV: Heart regular rate and rhythm, no murmur, rub, or gallop  Abd: Soft, mild RLQ tenderness, nondistended, normal  bowel sounds, no HSM   Ext: Warm and well perfused, no LE edema  Skin: warm, dry, no rash  Neuro: Alert and oriented x 3, no focal deficits       Assessment and Plan:  1. Need for vaccination for Strep pneumoniae  Pneumonia vaccine received in clinic today due to hx of HCC. Recommended he receive Shingrix when he is due for it in January 2020 after first checking with his insurance. Encouraged him to incorporate 5 minutes of walking per day with subsequent increase as his energy level allows.   - Pneumococcal vaccine 13 valent PCV13 IM (Prevnar) [[id number]]    2. Liver cirrhosis secondary to ESTRADA   Extensive discussion with patient today regarding hepatocellular carcinoma and monitoring moving forward. MRI scheduled for 10/23. Refill provided.  - carvedilol (COREG) 12.5 MG tablet  Dispense: 180 tablet; Refill: 3    3. Secondary esophageal varices without bleeding  Refill provided.   - carvedilol (COREG) 12.5 MG tablet  Dispense: 180 tablet; Refill: 3    4. Benign prostatic hyperplasia with lower urinary tract symptoms  Refill provided.   - tamsulosin (FLOMAX) 0.4 MG capsule  Dispense: 90 capsule; Refill: 3      Follow-up:  Follow-up in 6 months with Dr. Moe.         Scribe Disclosure:  I, Paula Spicer, am serving as a scribe to document services personally performed by Natalie Russell MD at this visit, based upon the provider's statements to me. All documentation has been reviewed by the aforementioned provider prior to being entered into the official medical record.     Portions of this medical record were completed by a scribe. UPON MY REVIEW AND AUTHENTICATION BY ELECTRONIC SIGNATURE, this confirms (a) I performed the applicable clinical services, and (b) the record is accurate.     Natalie Russell MD

## 2019-10-07 DIAGNOSIS — E11.3493 SEVERE NONPROLIFERATIVE DIABETIC RETINOPATHY OF BOTH EYES WITHOUT MACULAR EDEMA ASSOCIATED WITH TYPE 2 DIABETES MELLITUS (H): Primary | ICD-10-CM

## 2019-10-09 ENCOUNTER — OFFICE VISIT (OUTPATIENT)
Dept: OPHTHALMOLOGY | Facility: CLINIC | Age: 54
End: 2019-10-09
Attending: OPHTHALMOLOGY
Payer: MEDICARE

## 2019-10-09 DIAGNOSIS — E11.3493 SEVERE NONPROLIFERATIVE DIABETIC RETINOPATHY OF BOTH EYES WITHOUT MACULAR EDEMA ASSOCIATED WITH TYPE 2 DIABETES MELLITUS (H): ICD-10-CM

## 2019-10-09 PROCEDURE — 92134 CPTRZ OPH DX IMG PST SGM RTA: CPT | Mod: ZF | Performed by: OPHTHALMOLOGY

## 2019-10-09 PROCEDURE — 92015 DETERMINE REFRACTIVE STATE: CPT | Mod: GY,ZF

## 2019-10-09 PROCEDURE — G0463 HOSPITAL OUTPT CLINIC VISIT: HCPCS | Mod: ZF

## 2019-10-09 ASSESSMENT — CONF VISUAL FIELD
METHOD: COUNTING FINGERS
OD_NORMAL: 1
OS_NORMAL: 1

## 2019-10-09 ASSESSMENT — VISUAL ACUITY
OS_CC: 20/25
OD_PH_CC+: -3
OD_CC+: +2
OD_CC: 20/30
METHOD: SNELLEN - LINEAR
OS_PH_CC: 20/25
OD_PH_CC: 20/25
OS_PH_CC+: +1
CORRECTION_TYPE: GLASSES
OS_CC+: -1

## 2019-10-09 ASSESSMENT — TONOMETRY
OD_IOP_MMHG: 14
OS_IOP_MMHG: 12
IOP_METHOD: TONOPEN

## 2019-10-09 ASSESSMENT — REFRACTION_WEARINGRX
OD_CYLINDER: +0.75
OD_SPHERE: -7.25
OS_ADD: +2.00
OS_AXIS: 040
OD_ADD: +2.00
OS_CYLINDER: +0.75
OS_SPHERE: -7.25
OD_AXIS: 132
SPECS_TYPE: PAL

## 2019-10-09 ASSESSMENT — REFRACTION_MANIFEST
OS_ADD: +2.50
OS_CYLINDER: +0.50
OD_AXIS: 120
OD_CYLINDER: +1.00
OS_SPHERE: -7.50
OD_SPHERE: -8.00
OD_ADD: +2.50
OS_AXIS: 045

## 2019-10-09 ASSESSMENT — SLIT LAMP EXAM - LIDS
COMMENTS: NORMAL
COMMENTS: SMALL PAPILLOMA ALONG LL MARGIN

## 2019-10-09 ASSESSMENT — EXTERNAL EXAM - LEFT EYE: OS_EXAM: NORMAL

## 2019-10-09 ASSESSMENT — CUP TO DISC RATIO
OS_RATIO: 0.3
OD_RATIO: 0.25

## 2019-10-09 ASSESSMENT — EXTERNAL EXAM - RIGHT EYE: OD_EXAM: NORMAL

## 2019-10-09 NOTE — PROGRESS NOTES
CC:  Follow up Diabetic macular edema  left eye     HPI: Frandy Workman is a  54 year old year-old patient with history of Diabetes mellitus for 24 ys . Patient on insulin.  HBA1c 6.6 08/08/18. Patient was evaluated by dr. Amezquita and sent to the retina clinic for management of Diabetic macular edema     Interval History: VA in right eye seems worsened (he associates this w/ allergies), left eye stable. No new flashes and floaters. Last HbA1C 5.4 on 8/14/19.    Retinal Imaging:  OCT 10-9-19  RE: resolved Diabetic macular edema; 1 small cysts parafovea  LE: resolved DME     fluorescein angiography transits right eye 9-5-19  Right eye: diffuse microaneurysms, late mild macular leakage; mild peripheral capillary non perfusion;  mild vessel leakage in late phase, no NVD/NVE  Left eye: diffuse microaneurysms, late mild macula leakage; mild peripheral capillary non perfusion;  mild vessel leakage in late phase, no NVD/NVE    Optos consistent with clinical exam    Assessment & Plan:    1.  Moderate nonproliferative diabetic retinopathy of both eyes    - No NV on FA today each eye - improved leakage on fluorescein angiography compared to march, 2019   - Blood pressure (<120/80) and blood glucose (HbA1c <7.0) control discussed with patient. Patient advised  that failure to adequately control each may lead to vision loss. The patient expressed understanding.    2. Diabetic macular edema both eyes    - status post avastin x 4. Switch to Eylea 4/24/19 (s/p 3 injections) with improvement of Diabetic macular edema    - now with resolution of Diabetic macular edema.   - Last Eylea injection was 6/27/19 (15 weeks)   - patient has minimal cystic edema in left eye, no recurrence in right eye with Eylea   - minimal recurrent fluid left eye today with no effect on vision, no fluid today right eye - extend inj interval further. Today is 15 weeks.  Could treat as needed           3. Myopia, bilateral  Prescription given today  (10/9/19)     4. Senile nuclear sclerosis, bilateral  Comment: Not visually significant OU    5. Dry eye syndrome   Left eye worse than right eye   warm compresses and artificial tears  As needed  -punctal plugs previously left eye - reports this is better     Plan: follow up 4-6weeks for Optical Coherence Tomography both eyes and possible Eylea each eye.   Will consider fluorescein angiography in 4 months    Rakesh Garduno MD  Ophthalmology Resident  PGY-3     ~~~~~~~~~~~~~~~~~~~~~~~~~~~~~~~~~~   Complete documentation of historical and exam elements from today's encounter can be found in the full encounter summary report (not reduplicated in this progress note).  I personally obtained the chief complaint(s) and history of present illness.  I confirmed and edited as necessary the review of systems, past medical/surgical history, family history, social history, and examination findings as documented by others; and I examined the patient myself.  I personally reviewed the relevant tests, images, and reports as documented above.  I personally reviewed the ophthalmic test(s) associated with this encounter, agree with the interpretation(s) as documented by the resident/fellow, and have edited the corresponding report(s) as necessary.   I formulated and edited as necessary the assessment and plan and discussed the findings and management plan with the patient and family    Enriqueta Martin MD   of Ophthalmology.  Retina Service   Department of Ophthalmology and Visual Neurosciences   [facility] [place]  Phone: [phone]   Fax: [phone]

## 2019-10-09 NOTE — NURSING NOTE
Chief Complaints and History of Present Illnesses   Patient presents with     Follow Up     5 week follow up  Moderate nonproliferative diabetic retinopathy of both eyes      Chief Complaint(s) and History of Present Illness(es)     Follow Up     Comments: 5 week follow up  Moderate nonproliferative diabetic retinopathy of both eyes               Comments     Pt states vision is the same as last visit. No eye pain today.   No new flashes or floaters.  DM2 BS: 125 this morning.  Lab Results       Component                Value               Date                  A1C: 5.4 taken 06/2019 per pt       A1C                      6.6                 08/08/2018                 A1C                      6.5                 06/09/2017                 A1C                      7.8                 10/25/2016              LEX Arceo October 9, 2019 12:16 PM

## 2019-10-17 ENCOUNTER — MYC MEDICAL ADVICE (OUTPATIENT)
Dept: GASTROENTEROLOGY | Facility: CLINIC | Age: 54
End: 2019-10-17

## 2019-10-17 NOTE — TELEPHONE ENCOUNTER
Refaxed disability forms to number provided.  Patient is aware.  Will call [employer] to confirm forms were received.

## 2019-10-23 ENCOUNTER — TELEPHONE (OUTPATIENT)
Dept: GASTROENTEROLOGY | Facility: CLINIC | Age: 54
End: 2019-10-23

## 2019-10-23 ENCOUNTER — ANCILLARY PROCEDURE (OUTPATIENT)
Dept: MRI IMAGING | Facility: CLINIC | Age: 54
End: 2019-10-23
Attending: INTERNAL MEDICINE
Payer: MEDICARE

## 2019-10-23 DIAGNOSIS — C22.0 HCC (HEPATOCELLULAR CARCINOMA) (H): ICD-10-CM

## 2019-10-23 DIAGNOSIS — K76.82 HEPATIC ENCEPHALOPATHY (H): ICD-10-CM

## 2019-10-23 LAB
AFP SERPL-MCNC: 3.7 UG/L (ref 0–8)
ALBUMIN SERPL-MCNC: 2.8 G/DL (ref 3.4–5)
ALP SERPL-CCNC: 138 U/L (ref 40–150)
ALT SERPL W P-5'-P-CCNC: 54 U/L (ref 0–70)
ANION GAP SERPL CALCULATED.3IONS-SCNC: 5 MMOL/L (ref 3–14)
AST SERPL W P-5'-P-CCNC: 51 U/L (ref 0–45)
BASOPHILS # BLD AUTO: 0 10E9/L (ref 0–0.2)
BASOPHILS NFR BLD AUTO: 0.6 %
BILIRUB DIRECT SERPL-MCNC: 0.4 MG/DL (ref 0–0.2)
BILIRUB SERPL-MCNC: 1 MG/DL (ref 0.2–1.3)
BUN SERPL-MCNC: 25 MG/DL (ref 7–30)
CALCIUM SERPL-MCNC: 8.5 MG/DL (ref 8.5–10.1)
CHLORIDE SERPL-SCNC: 112 MMOL/L (ref 94–109)
CO2 SERPL-SCNC: 23 MMOL/L (ref 20–32)
CREAT SERPL-MCNC: 1.09 MG/DL (ref 0.66–1.25)
DIFFERENTIAL METHOD BLD: ABNORMAL
EOSINOPHIL # BLD AUTO: 0.1 10E9/L (ref 0–0.7)
EOSINOPHIL NFR BLD AUTO: 4.1 %
ERYTHROCYTE [DISTWIDTH] IN BLOOD BY AUTOMATED COUNT: 14 % (ref 10–15)
GFR SERPL CREATININE-BSD FRML MDRD: 76 ML/MIN/{1.73_M2}
GLUCOSE SERPL-MCNC: 96 MG/DL (ref 70–99)
HCT VFR BLD AUTO: 37.7 % (ref 40–53)
HGB BLD-MCNC: 12.6 G/DL (ref 13.3–17.7)
IMM GRANULOCYTES # BLD: 0 10E9/L (ref 0–0.4)
IMM GRANULOCYTES NFR BLD: 0.3 %
INR PPP: 1.29 (ref 0.86–1.14)
LYMPHOCYTES # BLD AUTO: 0.6 10E9/L (ref 0.8–5.3)
LYMPHOCYTES NFR BLD AUTO: 19.4 %
MCH RBC QN AUTO: 35.8 PG (ref 26.5–33)
MCHC RBC AUTO-ENTMCNC: 33.4 G/DL (ref 31.5–36.5)
MCV RBC AUTO: 107 FL (ref 78–100)
MONOCYTES # BLD AUTO: 0.3 10E9/L (ref 0–1.3)
MONOCYTES NFR BLD AUTO: 10.5 %
NEUTROPHILS # BLD AUTO: 2 10E9/L (ref 1.6–8.3)
NEUTROPHILS NFR BLD AUTO: 65.1 %
NRBC # BLD AUTO: 0 10*3/UL
NRBC BLD AUTO-RTO: 0 /100
PLATELET # BLD AUTO: 40 10E9/L (ref 150–450)
POTASSIUM SERPL-SCNC: 4 MMOL/L (ref 3.4–5.3)
PROT SERPL-MCNC: 7 G/DL (ref 6.8–8.8)
RBC # BLD AUTO: 3.52 10E12/L (ref 4.4–5.9)
SODIUM SERPL-SCNC: 140 MMOL/L (ref 133–144)
WBC # BLD AUTO: 3.1 10E9/L (ref 4–11)

## 2019-10-23 PROCEDURE — 82105 ALPHA-FETOPROTEIN SERUM: CPT | Performed by: INTERNAL MEDICINE

## 2019-10-23 RX ORDER — RIFAXIMIN 550 MG/1
TABLET ORAL
Qty: 60 TABLET | Refills: 0 | Status: ON HOLD | OUTPATIENT
Start: 2019-10-23 | End: 2019-11-20

## 2019-10-23 RX ORDER — GADOBUTROL 604.72 MG/ML
7.5 INJECTION INTRAVENOUS ONCE
Status: COMPLETED | OUTPATIENT
Start: 2019-10-23 | End: 2019-10-23

## 2019-10-23 RX ADMIN — GADOBUTROL 7.5 ML: 604.72 INJECTION INTRAVENOUS at 09:03

## 2019-10-23 NOTE — TELEPHONE ENCOUNTER
Prior Authorization Specialty Medication Request    Medication/Dose: Xifaxan 550 mg tab  ICD code (if different than what is on RX):  K72.90     Previously Tried and Failed:  Lactulose    Important Lab Values:   Rationale: Xifaxan helps to lower the toxin build up in the blood while lactulose works by cleansing the toxin build up in the liver. Adjunctive therapy.      Insurance Name:   Insurance ID:   Insurance Phone Number:     Pharmacy Information (if different than what is on RX)  Name:    Phone:

## 2019-10-23 NOTE — TELEPHONE ENCOUNTER
Prior Authorization Not Needed per Insurance    Medication: Xifaxan 550 mg tab-PA Not Needed  Insurance Company: [employer] - Phone [phone] Fax [phone]  Expected CoPay:      Pharmacy Filling the Rx: [id number] - [place], [place] - [address]  Pharmacy Notified: Yes-Pharmacy processed through insurance successfully. They will need to order the medication in for next day. Patient will be notified when ready.  Patient Notified: No

## 2019-10-25 ENCOUNTER — DOCUMENTATION ONLY (OUTPATIENT)
Dept: TRANSPLANT | Facility: CLINIC | Age: 54
End: 2019-10-25

## 2019-10-28 ENCOUNTER — OFFICE VISIT (OUTPATIENT)
Dept: GASTROENTEROLOGY | Facility: CLINIC | Age: 54
End: 2019-10-28
Attending: INTERNAL MEDICINE
Payer: MEDICARE

## 2019-10-28 ENCOUNTER — ONCOLOGY VISIT (OUTPATIENT)
Dept: ONCOLOGY | Facility: CLINIC | Age: 54
End: 2019-10-28
Attending: INTERNAL MEDICINE
Payer: MEDICARE

## 2019-10-28 VITALS
HEART RATE: 63 BPM | TEMPERATURE: 97.7 F | WEIGHT: 172.8 LBS | OXYGEN SATURATION: 99 % | DIASTOLIC BLOOD PRESSURE: 56 MMHG | SYSTOLIC BLOOD PRESSURE: 99 MMHG | RESPIRATION RATE: 14 BRPM | BODY MASS INDEX: 25.59 KG/M2 | HEIGHT: 69 IN

## 2019-10-28 VITALS
HEIGHT: 69 IN | DIASTOLIC BLOOD PRESSURE: 56 MMHG | OXYGEN SATURATION: 99 % | BODY MASS INDEX: 25.48 KG/M2 | SYSTOLIC BLOOD PRESSURE: 99 MMHG | HEART RATE: 63 BPM | TEMPERATURE: 97.7 F | WEIGHT: 172 LBS

## 2019-10-28 DIAGNOSIS — M62.529 ATROPHY OF MUSCLE OF UPPER ARM, UNSPECIFIED LATERALITY: Primary | ICD-10-CM

## 2019-10-28 DIAGNOSIS — C22.0 HCC (HEPATOCELLULAR CARCINOMA) (H): Primary | ICD-10-CM

## 2019-10-28 PROCEDURE — G0463 HOSPITAL OUTPT CLINIC VISIT: HCPCS | Mod: ZF

## 2019-10-28 PROCEDURE — 99214 OFFICE O/P EST MOD 30 MIN: CPT | Mod: ZP | Performed by: INTERNAL MEDICINE

## 2019-10-28 PROCEDURE — G0463 HOSPITAL OUTPT CLINIC VISIT: HCPCS | Mod: 27,ZF

## 2019-10-28 RX ORDER — OLANZAPINE 2.5 MG/1
TABLET, FILM COATED ORAL
Status: ON HOLD | COMMUNITY
Start: 2016-10-24 | End: 2019-11-13

## 2019-10-28 ASSESSMENT — PAIN SCALES - GENERAL
PAINLEVEL: NO PAIN (0)
PAINLEVEL: MODERATE PAIN (5)

## 2019-10-28 ASSESSMENT — MIFFLIN-ST. JEOR
SCORE: 1605.57
SCORE: 1609.2

## 2019-10-28 NOTE — PROGRESS NOTES
10/23/19 MRI  1.) no HCC  2.) liver thrombus stable      Recs:  - 3 mo follow up   - push for live donor

## 2019-10-28 NOTE — PROGRESS NOTES
[facility]    Hepatology follow-up    Follow-up visit for cirrhosis    Subjective:  54 year old male    Cirrhosis  - dx 2013  - ESTRADA, A1-AT phenotype- PiMZ  - hx HE  - hx ascites  - no hx variceal bleed  - last EGD Dec 2018- small EV, portal hypertensive gastropathy  - HCC screening- see below     HCC  - dx Dec 2018  - MRI liver 12/23/18- 3.1cm lesion segment IVB, 1.1cm lesion segment III  - AFP 12/28/18= 5.2  - bone scan 1/4/19  - CT chest 1/4/19  - TACE 1/22/19 (seg IV)  - Microwave ablation 1/23/19 (seg III)  - last MRI liver 10/23/19   - last AFP 10/23/19= 3.7    Patient comes to clinic this afternoon for follow-up of cirrhosis.  Patient denies new medications, ER visits or hospital admissions since last clinic visit.    Patient is well today.  He reports some loss of muscle mass in his arms over the past few months.  He denies any weakness or sensation changes.  He denies any symptoms specific to liver disease.    Patient denies jaundice, lower extremity edema, abdominal distension, lethargy or confusion.    Patient denies melena, hematemesis or hematochezia.    Patient denies fevers, sweats or chills.  Weight stable.    Patient does not drink alcohol.  He has friends coming in from [place] for the holiday season.      Medical hx Surgical hx   Past Medical History:   Diagnosis Date     Anemia 2013    Low blood plates current is 37     Arthritis      BPH (benign prostatic hyperplasia)      CAD (coronary artery disease) 4/1/2019     Cholelithiasis      Conductive hearing loss 8/16/2017    Have a lump on my right side of my face.  Had wax discharge     Depressive disorder 1986    Suffer effects throughout life     Gastroesophageal reflux disease 12/1/2014    Being treated with Prilosac     HCC (hepatocellular carcinoma) (H) 1/22/2019     History of diabetic retinopathy 07/2018     HTN (hypertension)      Hyperlipidemia      Liver cirrhosis secondary to ESTRADA (H)      Portal vein thrombosis  4/11/2019     Type II diabetes mellitus (H)     Insulin adminstered BID daily.       Past Surgical History:   Procedure Laterality Date     COLONOSCOPY      2015     CV HEART CATHETERIZATION WITH POSSIBLE INTERVENTION N/A 2/26/2019    Procedure: CORS;  Surgeon: Jagdish Hoyt MD;  Location:  HEART CARDIAC CATH LAB     ESOPHAGOSCOPY, GASTROSCOPY, DUODENOSCOPY (EGD), COMBINED N/A 11/17/2016    Procedure: COMBINED ESOPHAGOSCOPY, GASTROSCOPY, DUODENOSCOPY (EGD);  Surgeon: Santi Rosas MD;  Location:  GI     ESOPHAGOSCOPY, GASTROSCOPY, DUODENOSCOPY (EGD), COMBINED N/A 11/17/2017    Procedure: COMBINED ESOPHAGOSCOPY, GASTROSCOPY, DUODENOSCOPY (EGD);  EGD;  Surgeon: Santi Rosas MD;  Location:  GI     ESOPHAGOSCOPY, GASTROSCOPY, DUODENOSCOPY (EGD), COMBINED N/A 12/28/2018    Procedure: EGD;  Surgeon: Santi Rosas MD;  Location:  OR     HEAD & NECK SURGERY      12/2017 at [facility].      IMPLANT GOLD WEIGHT EYELID Right 11/16/2017    Procedure: IMPLANT WEIGHT EYELID;  Right Upper Eyelid Weight, right tarsal strip lower eyelid;  Surgeon: Milana Malave MD;  Location:  OR     IR CHEMO EMBOLIZATION  1/22/2019     KNEE SURGERY Left      ORTHOPEDIC SURGERY       PAROTIDECTOMY, RADICAL NECK DISSECTION Right 11/2/2017    Procedure: PAROTIDECTOMY, RADICAL NECK DISSECTION;  Right Superfacial Parotidectomy , Facial nerve repair. with [facility] facial nerve monitor.;  Surgeon: Asiya Morgan MD;  Location: [facility] OR     PICC INSERTION Left 11/06/2017    4fr SL BioFlo PICC, 44cm in the L basilic vein w/ tip in the low SVC     VASCULAR SURGERY            Medications  Prior to Admission medications    Medication Sig Start Date End Date Taking? Authorizing Provider   alpha-lipoic acid 600 MG capsule Take 1 capsule (600 mg) by mouth daily 4/22/19  Yes Jennifer Wilcox MD   Artificial Tear Solution (SM ARTIFICIAL TEARS) SOLN Place 1 drop into the right eye every hour as needed Apply  at least 4 times daily and as needed for dry eye 7/2/18  Yes Milana Malave MD   aspirin (ASA) 81 MG EC tablet Take 1 tablet (81 mg) by mouth daily 5/30/19  Yes Brenda Delgadillo MD   BD VIKTORIA U/F 32G X 4 MM insulin pen needle Use 5 per day 4/11/18  Yes Jennifer Wilcox MD   blood glucose monitoring (ACCU-CHEK EDINSON PLUS) test strip Use to test blood sugar 4 times daily 10/13/17  Yes Natalie Chun MD   blood glucose monitoring (ACCU-CHEK FASTCLIX) lancets Use to test blood sugar 4 times daily or as directed.  1 box = 102 lancets 10/13/17  Yes Natalie Chun MD   carvedilol (COREG) 12.5 MG tablet TAKE 1 TABLET(12.5 MG) BY MOUTH TWICE DAILY WITH MEALS 10/1/19  Yes Natalie Chun MD   Cholecalciferol (VITAMIN D-3 PO) Take 2,000 Units by mouth every morning    Yes Reported, Patient   cyanocobalamin (RA VITAMIN B-12 TR) 1000 MCG TBCR Take 5,000 mcg by mouth every morning  3/14/16  Yes Reported, Patient   dapagliflozin (FARXIGA) 10 MG TABS tablet Take 1 tablet (10 mg) by mouth daily 8/28/19  Yes Jennifer Wilcox MD   diclofenac (VOLTAREN) 1 % topical gel Place 2 g onto the skin 4 times daily 3/18/19  Yes Natalie Chun MD   econazole nitrate 1 % external cream Apply topically daily To feet and toenails. 7/31/19  Yes Lamin Gonzalez DPM   ferrous sulfate (FEROSUL) 325 (65 Fe) MG tablet Take 1 tablet (325 mg) by mouth 3 times daily (with meals) 9/5/19  Yes Santi Rosas MD   insulin degludec (TRESIBA) 200 UNIT/ML pen Take 70 units daily. 9/25/19  Yes Jennifer Wilcox MD   lactulose (CHRONULAC) 10 GM/15ML solution Take 45 mLs (30 g) by mouth 4 times daily 12/27/18  Yes Santi Rosas MD   metFORMIN (GLUCOPHAGE-XR) 500 MG 24 hr tablet Take 1 tablet (500 mg) by mouth daily (with breakfast) 8/28/19  Yes Jennifer Wilcox MD   Multiple Vitamin (THERAVITE PO) Take 1 tablet by mouth every morning  " 3/14/16  Yes Reported, Patient   NOVOLOG FLEXPEN 100 UNIT/ML soln INJECT 1 UNIT PER 4 GRAMS OF CARBS AT MEALS AND SNACKS. CORRECTION SCALE OF 1 UNITS PER 25 OVER [address]. AVE DOSE 75 UNITERS PER DAY 11/21/18  Yes Natalie Chun MD   omeprazole (PRILOSEC) 40 MG DR capsule Take 1 capsule (40 mg) by mouth daily 9/13/19  Yes Natalie Chun MD   potassium chloride ER (K-DUR/KLOR-CON M) 20 MEQ CR tablet Take 1 tablet (20 mEq) by mouth daily 3/18/19  Yes Natalie Chun MD   rosuvastatin (CRESTOR) 20 MG tablet Take 1 tablet (20 mg) by mouth daily 9/24/19  Yes Santi Rosas MD   tamsulosin (FLOMAX) 0.4 MG capsule TAKE 1 CAPSULE(0.4 MG) BY MOUTH DAILY 10/1/19  Yes Natalie Chun MD   traZODone (DESYREL) 50 MG tablet Take 1 tablet (50 mg) by mouth At Bedtime 6/7/19  Yes Natalie Chun MD   XIFAXAN 550 MG TABS tablet TAKE 1 TABLET(550 MG) BY MOUTH TWICE DAILY 10/23/19  Yes Santi Rosas MD   OLANZapine (ZYPREXA) 2.5 MG tablet TAKE 1 TABLET BY MOUTH EVERY NIGHT AT BEDTIME 10/24/16   Reported, Patient       Allergies  Allergies   Allergen Reactions     Codeine Other (See Comments)     Cannot take due to liver  Cannot tolerate oral narcotics     Seasonal Allergies      Sneezing, coughing, runny and itchy eyes       Review of systems  A 10-point review of systems was negative    Examination  BP 99/56   Pulse 63   Temp 97.7  F (36.5  C) (Oral)   Ht 1.753 m (5' 9\")   Wt 78 kg (172 lb)   SpO2 99%   BMI 25.40 kg/m    Body mass index is 25.4 kg/m .    Gen- well, NAD, A+Ox3, normal color  CVS- RRR  RS- CTA  Abd- central obesity, striae, soft, non-tender, no ascites or organomegaly on palpation or percussion, BS+  Extr- hands normal, no LEILA  Skin- no rash or jaundice  Neuro- no asterixis  Psych- normal mood    Laboratory  Lab Results   Component Value Date     10/23/2019    POTASSIUM 4.0 10/23/2019    CHLORIDE 112 10/23/2019    CO2 23 " 10/23/2019    BUN 25 10/23/2019    CR 1.09 10/23/2019       Lab Results   Component Value Date    BILITOTAL 1.0 10/23/2019    ALT 54 10/23/2019    AST 51 10/23/2019    ALKPHOS 138 10/23/2019       Lab Results   Component Value Date    ALBUMIN 2.8 10/23/2019    PROTTOTAL 7.0 10/23/2019        Lab Results   Component Value Date    WBC 3.1 10/23/2019    HGB 12.6 10/23/2019     10/23/2019    PLT 40 10/23/2019       Lab Results   Component Value Date    INR 1.29 10/23/2019       Radiology  MRI liver 10/23/19 reviewed    Assessment  54 year old male listed for liver transplant who presents for routine follow-up of decompensated ESTRADA cirrhosis complicated with ascites, hepatic encephalopathy and HCC.  MELD-Na= 10 (stable).  UNOS MELD-Na= 29 with tumor exception points.  No evidence of ascites.  Hepatic encephalopathy controlled on current medications. Renal function back to normal off diuretics.  Will check serum total testosterone for muscle loss in context of history of cirrhosis.  Up to date with surveillance of esophageal varices.    Plan  1.  Check serum total testosterone (between 8 and 10am)  2.  Low Na diet  3.  Continue lactulose and rifaximin  4.  Follow-up in 3 months    Santi Banuelos MD  Hepatology  [facility]

## 2019-10-28 NOTE — PROGRESS NOTES
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   He used to live in [place], but moved to [place] to be closer to his daughter, although he seems to be estranged from her now, as well as the rest of his family.  He has cirrhosis felt secondary to ESTRADA, and says that he was on the transplant list at [place] when he lived in [place].  He follows with Dr. Banuelos here in the hepatology clinic now.  He underwent routine screening ultrasound on 12/10/18, which showed a non-specific left hepatic lobe lesion.  MRI abdomen on 12/23/19 showed 2 lesions, measuring 3.1 cm and 1.1 cm in hepatic segments 4b and 3, respectively, that were LIRADS 5.  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      He underwent TACE on 1/22/19 to segment IV lesion and CT-guided microwave ablation on 1/23/19 to segment III lesion.    He is on liver transplant list.      He does not smoke, but used to chew tobacco.  He had a parotid gland mass removed previously that was benign.  He does not drink alcohol.    He notes that he has no family or friends here.  He is estranged from his family and daughter.  He notes that his neighbor can give him rides to appointments.          INTERIM HISTORY: Miller is here for follow-up today.  He says that he feels well and denies any new symptoms today.          REVIEW OF SYSTEMS:   14 point ROS was reviewed and is negative other than as noted above in HPI.       HOME MEDICATIONS:  Current Outpatient Medications   Medication Sig Dispense Refill     alpha-lipoic acid 600 MG capsule Take 1 capsule (600 mg) by mouth daily 90 capsule 3     Artificial Tear Solution (SM ARTIFICIAL TEARS) SOLN Place 1 drop into  the right eye every hour as needed Apply at least 4 times daily and as needed for dry eye 1 Bottle 3     aspirin (ASA) 81 MG EC tablet Take 1 tablet (81 mg) by mouth daily 90 tablet 3     BD VIKTORIA U/F 32G X 4 MM insulin pen needle Use 5 per day 300 each 3     blood glucose monitoring (ACCU-CHEK EDINSON PLUS) test strip Use to test blood sugar 4 times daily 400 each 3     blood glucose monitoring (ACCU-CHEK FASTCLIX) lancets Use to test blood sugar 4 times daily or as directed.  1 box = 102 lancets 408 each 3     carvedilol (COREG) 12.5 MG tablet TAKE 1 TABLET(12.5 MG) BY MOUTH TWICE DAILY WITH MEALS 180 tablet 3     Cholecalciferol (VITAMIN D-3 PO) Take 2,000 Units by mouth every morning        cyanocobalamin (RA VITAMIN B-12 TR) 1000 MCG TBCR Take 5,000 mcg by mouth every morning        dapagliflozin (FARXIGA) 10 MG TABS tablet Take 1 tablet (10 mg) by mouth daily 90 tablet 3     diclofenac (VOLTAREN) 1 % topical gel Place 2 g onto the skin 4 times daily 100 g 3     econazole nitrate 1 % external cream Apply topically daily To feet and toenails. 85 g 0     ferrous sulfate (FEROSUL) 325 (65 Fe) MG tablet Take 1 tablet (325 mg) by mouth 3 times daily (with meals) 90 tablet 3     insulin degludec (TRESIBA) 200 UNIT/ML pen Take 70 units daily. 100 mL 3     lactulose (CHRONULAC) 10 GM/15ML solution Take 45 mLs (30 g) by mouth 4 times daily 5000 mL 11     metFORMIN (GLUCOPHAGE-XR) 500 MG 24 hr tablet Take 1 tablet (500 mg) by mouth daily (with breakfast) 90 tablet 3     Multiple Vitamin (THERAVITE PO) Take 1 tablet by mouth every morning        NOVOLOG FLEXPEN 100 UNIT/ML soln INJECT 1 UNIT PER 4 GRAMS OF CARBS AT MEALS AND SNACKS. CORRECTION SCALE OF 1 UNITS PER 25 OVER [address] DOSE 75 UNITERS PER DAY 30 mL 3     OLANZapine (ZYPREXA) 2.5 MG tablet TAKE 1 TABLET BY MOUTH EVERY NIGHT AT BEDTIME       omeprazole (PRILOSEC) 40 MG DR capsule Take 1 capsule (40 mg) by mouth daily 90 capsule 2     potassium chloride ER  (K-DUR/KLOR-CON M) 20 MEQ CR tablet Take 1 tablet (20 mEq) by mouth daily 90 tablet 3     rosuvastatin (CRESTOR) 20 MG tablet Take 1 tablet (20 mg) by mouth daily 60 tablet 2     tamsulosin (FLOMAX) 0.4 MG capsule TAKE 1 CAPSULE(0.4 MG) BY MOUTH DAILY 90 capsule 3     traZODone (DESYREL) 50 MG tablet Take 1 tablet (50 mg) by mouth At Bedtime 90 tablet 1     XIFAXAN 550 MG TABS tablet TAKE 1 TABLET(550 MG) BY MOUTH TWICE DAILY 60 tablet 0     furosemide (LASIX) 20 MG tablet Take 1 tablet (20 mg) by mouth daily 90 tablet 0         ALLERGIES:  Allergies   Allergen Reactions     Codeine Other (See Comments)     Cannot take due to liver  Cannot tolerate oral narcotics     Seasonal Allergies      Sneezing, coughing, runny and itchy eyes         PAST MEDICAL HISTORY:  Past Medical History:   Diagnosis Date     Anemia 2013    Low blood plates current is 37     Arthritis      BPH (benign prostatic hyperplasia)      CAD (coronary artery disease) 4/1/2019     Cholelithiasis      Conductive hearing loss 8/16/2017    Have a lump on my right side of my face.  Had wax discharge     Depressive disorder 1986    Suffer effects throughout life     Gastroesophageal reflux disease 12/1/2014    Being treated with Prilosac     HCC (hepatocellular carcinoma) (H) 1/22/2019     History of diabetic retinopathy 07/2018     HTN (hypertension)      Hyperlipidemia      Liver cirrhosis secondary to ESTRADA (H)      Portal vein thrombosis 4/11/2019     Type II diabetes mellitus (H)     Insulin adminstered BID daily.          PAST SURGICAL HISTORY:  Past Surgical History:   Procedure Laterality Date     COLONOSCOPY      2015     CV HEART CATHETERIZATION WITH POSSIBLE INTERVENTION N/A 2/26/2019    Procedure: CORS;  Surgeon: Jagdish Hoyt MD;  Location: [facility] CARDIAC CATH LAB     ESOPHAGOSCOPY, GASTROSCOPY, DUODENOSCOPY (EGD), COMBINED N/A 11/17/2016    Procedure: COMBINED ESOPHAGOSCOPY, GASTROSCOPY, DUODENOSCOPY (EGD);  Surgeon: Vin Gaviria  Santi Dotson MD;  Location: [facility] GI     ESOPHAGOSCOPY, GASTROSCOPY, DUODENOSCOPY (EGD), COMBINED N/A 11/17/2017    Procedure: COMBINED ESOPHAGOSCOPY, GASTROSCOPY, DUODENOSCOPY (EGD);  EGD;  Surgeon: Santi Rosas MD;  Location: [facility] GI     ESOPHAGOSCOPY, GASTROSCOPY, DUODENOSCOPY (EGD), COMBINED N/A 12/28/2018    Procedure: EGD;  Surgeon: Santi Rosas MD;  Location: [facility] OR     HEAD & NECK SURGERY      12/2017 at [facility].      IMPLANT GOLD WEIGHT EYELID Right 11/16/2017    Procedure: IMPLANT WEIGHT EYELID;  Right Upper Eyelid Weight, right tarsal strip lower eyelid;  Surgeon: Milana Malave MD;  Location:  OR     IR CHEMO EMBOLIZATION  1/22/2019     KNEE SURGERY Left      ORTHOPEDIC SURGERY       PAROTIDECTOMY, RADICAL NECK DISSECTION Right 11/2/2017    Procedure: PAROTIDECTOMY, RADICAL NECK DISSECTION;  Right Superfacial Parotidectomy , Facial nerve repair. with [facility] facial nerve monitor.;  Surgeon: Asiya Morgan MD;  Location: [facility] OR     PICC INSERTION Left 11/06/2017    4fr SL BioFlo PICC, 44cm in the L basilic vein w/ tip in the low SVC     VASCULAR SURGERY           SOCIAL HISTORY:  Social History     Socioeconomic History     Marital status:      Spouse name: Not on file     Number of children: Not on file     Years of education: Not on file     Highest education level: Not on file   Occupational History     Not on file   Social Needs     Financial resource strain: Not on file     Food insecurity:     Worry: Not on file     Inability: Not on file     Transportation needs:     Medical: Not on file     Non-medical: Not on file   Tobacco Use     Smoking status: Never Smoker     Smokeless tobacco: Former User     Types: Chew     Tobacco comment: 1 tin per week   Substance and Sexual Activity     Alcohol use: No     Alcohol/week: 0.0 standard drinks     Comment: quit Sept. 1996     Drug use: No     Sexual activity: Not Currently     Partners: Female     Birth control/protection:  "Condom   Lifestyle     Physical activity:     Days per week: Not on file     Minutes per session: Not on file     Stress: Not on file   Relationships     Social connections:     Talks on phone: Not on file     Gets together: Not on file     Attends [employer] service: Not on file     Active member of club or organization: Not on file     Attends meetings of clubs or organizations: Not on file     Relationship status: Not on file     Intimate partner violence:     Fear of current or ex partner: Not on file     Emotionally abused: Not on file     Physically abused: Not on file     Forced sexual activity: Not on file   Other Topics Concern     Parent/sibling w/ CABG, MI or angioplasty before 65F 55M? Yes   Social History Narrative     Not on file         FAMILY HISTORY:  Family History   Problem Relation Age of Onset     Prostate Cancer Maternal Grandfather      Substance Abuse Maternal Grandfather         Alcohol     Colon Cancer Father 60     Pancreatic Cancer Father 60     Prostate Cancer Father      Colorectal Cancer Father      Macular Degeneration Father      Cancer Father      Glaucoma Father      Skin Cancer Father      Colorectal Cancer Maternal Grandmother      Cancer Maternal Grandmother      Substance Abuse Maternal Grandmother         Alcohol     Colorectal Cancer Paternal Grandmother      Cancer Mother      Diabetes Mother          3/2016     Cerebrovascular Disease Mother         Passed away in Feb of this year, 80 years old.     Thyroid Disease Mother      Depression Mother      Asthma Sister         Had since birth     Thyroid Disease Sister      Depression Sister      Liver Disease No family hx of      Melanoma No family hx of          PHYSICAL EXAM:  Vital signs:  BP 99/56   Pulse 63   Temp 97.7  F (36.5  C) (Oral)   Resp 14   Ht 1.753 m (5' 9\")   Wt 78.4 kg (172 lb 12.8 oz)   SpO2 99%   BMI 25.52 kg/m     ECO  GENERAL/CONSTITUTIONAL: No acute distress.  EYES: No scleral " icterus.  MUSCULOSKELETAL: Warm and well-perfused.  NEUROLOGIC: Alert, oriented, answers questions appropriately.  INTEGUMENTARY: No jaundice.      LABS:  CBC RESULTS:   Recent Labs   Lab Test 10/23/19  0847   WBC 3.1*   RBC 3.52*   HGB 12.6*   HCT 37.7*   *   MCH 35.8*   MCHC 33.4   RDW 14.0   PLT 40*     Recent Labs   Lab Test 10/23/19  0847 09/12/19  1300 07/22/19  1226    143 142   POTASSIUM 4.0  --  4.2   CHLORIDE 112*  --  114*   CO2 23  --  25   ANIONGAP 5  --  3   GLC 96  --  80   BUN 25  --  23   CR 1.09 1.17 1.35*   DEBORAH 8.5  --  8.7     Lab Results   Component Value Date    AST 51 10/23/2019     Lab Results   Component Value Date    ALT 54 10/23/2019     No results found for: BILICONJ   Lab Results   Component Value Date    BILITOTAL 1.0 10/23/2019     Lab Results   Component Value Date    ALBUMIN 2.8 10/23/2019     Lab Results   Component Value Date    PROTTOTAL 7.0 10/23/2019      Lab Results   Component Value Date    ALKPHOS 138 10/23/2019     INR 1.29    Component      Latest Ref Rng & Units 2/26/2019 7/8/2019 9/12/2019 10/23/2019   Alpha Fetoprotein      0 - 8 ug/L 5.5 4.3 5.6 3.7         IMAGING:  MRI abdomen 10/12/19:  1a. Posttreatment changes in segment 4 and segment 3 without any  evidence of recurrent disease LIRADS TR nonviable.    1b. No new worrisome lesion in the liver.  2. Cirrhosis with portal hypertension (ascites, portosystemic  collaterals and splenomegaly)  3. Stable right pleural effusion.  4. Nonocclusive thrombosis in the superior mesenteric vein and the  anterior aspect of the main portal vein, unchanged compared to prior  exams.  5. Based on this exam alone, the patient is within Andrew criteria.      ASSESSMENT/PLAN:  Frandy Workman is a 53 year old male with:    1) Hepatocellular carcinoma: Child-Clark B (7).  Patient has a 3.1 cm lesion in segment 4b and 1.1 cm lesion in segment 3.  His CT chest and bone scan were negative for metastatic disease.    He underwent  TACE on 1/22/19 to segment IV lesion and CT-guided microwave ablation on 1/23/19 to segment III lesion.    I reviewed the MRI abdomen done on 10/23/19 and reviewed with the patient today.  There are post-treatment changes without any evidence of recurrence disease.  There is non-occlusive thrombosis in the SMV and anterior aspect of the main portal vein that is unchanged.    He is on liver transplant list.  He no longer follows with IR    -MRI and labs/AFP in 3 months  -RTC in 3 months with GI oncology MD    2) Cirrhosis: secondary to ESTRADA  -follows with hepatology - Dr. Banuelos    3) Pancytopenia: This has been a chronic issue and likely secondary to his history of liver disease.  Platelet count at baseline.    4) Diabetes:   -follows with endocrinology      Amanda Lees MD  Hematology/Oncology  [facility]

## 2019-10-28 NOTE — NURSING NOTE
"Oncology Rooming Note    October 28, 2019 1:21 PM   Frandy Workman is a 54 year old male who presents for:    Chief Complaint   Patient presents with     Oncology Clinic Visit     Return - HCC     Initial Vitals: Pulse 63   Temp 97.7  F (36.5  C) (Oral)   Resp 14   Ht 1.753 m (5' 9\")   Wt 78.4 kg (172 lb 12.8 oz)   SpO2 99%   BMI 25.52 kg/m   Estimated body mass index is 25.52 kg/m  as calculated from the following:    Height as of this encounter: 1.753 m (5' 9\").    Weight as of this encounter: 78.4 kg (172 lb 12.8 oz). Body surface area is 1.95 meters squared.  Moderate Pain (5) Comment: liver pain   No LMP for male patient.  Allergies reviewed: Yes  Medications reviewed: Yes    Medications: Medication refills not needed today.  Pharmacy name entered into [employer]:    [id number] - [place], [place] - [address] [id number] - [place], [place] - [address]    Clinical concerns: MRI Results       Sunil Owens, EMT              "

## 2019-10-28 NOTE — LETTER
RE: Frandy Workman  [address] [facility] [address]     Dear Colleague,    Thank you for referring your patient, Frandy Workman, to the [facility]. Please see a copy of my visit note below.    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   He used to live in [place], but moved to [place] to be closer to his daughter, although he seems to be estranged from her now, as well as the rest of his family.  He has cirrhosis felt secondary to ESTRADA, and says that he was on the transplant list at [facility] when he lived in [place].  He follows with Dr. Banuelos here in the hepatology clinic now.  He underwent routine screening ultrasound on 12/10/18, which showed a non-specific left hepatic lobe lesion.  MRI abdomen on 12/23/19 showed 2 lesions, measuring 3.1 cm and 1.1 cm in hepatic segments 4b and 3, respectively, that were LIRADS 5.  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      He underwent TACE on 1/22/19 to segment IV lesion and CT-guided microwave ablation on 1/23/19 to segment III lesion.    He is on liver transplant list.      He does not smoke, but used to chew tobacco.  He had a parotid gland mass removed previously that was benign.  He does not drink alcohol.    He notes that he has no family or friends here.  He is estranged from his family and daughter.  He notes that his neighbor can give him rides to appointments.          INTERIM HISTORY: Miller is here for follow-up today.  He says that he feels well and denies any new symptoms today.        REVIEW OF SYSTEMS:   14 point ROS was reviewed and is negative other than as noted above in HPI.       HOME MEDICATIONS:  Current  Outpatient Medications   Medication Sig Dispense Refill     alpha-lipoic acid 600 MG capsule Take 1 capsule (600 mg) by mouth daily 90 capsule 3     Artificial Tear Solution (SM ARTIFICIAL TEARS) SOLN Place 1 drop into the right eye every hour as needed Apply at least 4 times daily and as needed for dry eye 1 Bottle 3     aspirin (ASA) 81 MG EC tablet Take 1 tablet (81 mg) by mouth daily 90 tablet 3     BD VIKTORIA U/F 32G X 4 MM insulin pen needle Use 5 per day 300 each 3     blood glucose monitoring (ACCU-CHEK EDINSON PLUS) test strip Use to test blood sugar 4 times daily 400 each 3     blood glucose monitoring (ACCU-CHEK FASTCLIX) lancets Use to test blood sugar 4 times daily or as directed.  1 box = 102 lancets 408 each 3     carvedilol (COREG) 12.5 MG tablet TAKE 1 TABLET(12.5 MG) BY MOUTH TWICE DAILY WITH MEALS 180 tablet 3     Cholecalciferol (VITAMIN D-3 PO) Take 2,000 Units by mouth every morning        cyanocobalamin (RA VITAMIN B-12 TR) 1000 MCG TBCR Take 5,000 mcg by mouth every morning        dapagliflozin (FARXIGA) 10 MG TABS tablet Take 1 tablet (10 mg) by mouth daily 90 tablet 3     diclofenac (VOLTAREN) 1 % topical gel Place 2 g onto the skin 4 times daily 100 g 3     econazole nitrate 1 % external cream Apply topically daily To feet and toenails. 85 g 0     ferrous sulfate (FEROSUL) 325 (65 Fe) MG tablet Take 1 tablet (325 mg) by mouth 3 times daily (with meals) 90 tablet 3     insulin degludec (TRESIBA) 200 UNIT/ML pen Take 70 units daily. 100 mL 3     lactulose (CHRONULAC) 10 GM/15ML solution Take 45 mLs (30 g) by mouth 4 times daily 5000 mL 11     metFORMIN (GLUCOPHAGE-XR) 500 MG 24 hr tablet Take 1 tablet (500 mg) by mouth daily (with breakfast) 90 tablet 3     Multiple Vitamin (THERAVITE PO) Take 1 tablet by mouth every morning        NOVOLOG FLEXPEN 100 UNIT/ML soln INJECT 1 UNIT PER 4 GRAMS OF CARBS AT MEALS AND SNACKS. CORRECTION SCALE OF 1 UNITS PER 25 OVER [address]. AVE DOSE 75 UNITERS PER DAY 30 mL  3     OLANZapine (ZYPREXA) 2.5 MG tablet TAKE 1 TABLET BY MOUTH EVERY NIGHT AT BEDTIME       omeprazole (PRILOSEC) 40 MG DR capsule Take 1 capsule (40 mg) by mouth daily 90 capsule 2     potassium chloride ER (K-DUR/KLOR-CON M) 20 MEQ CR tablet Take 1 tablet (20 mEq) by mouth daily 90 tablet 3     rosuvastatin (CRESTOR) 20 MG tablet Take 1 tablet (20 mg) by mouth daily 60 tablet 2     tamsulosin (FLOMAX) 0.4 MG capsule TAKE 1 CAPSULE(0.4 MG) BY MOUTH DAILY 90 capsule 3     traZODone (DESYREL) 50 MG tablet Take 1 tablet (50 mg) by mouth At Bedtime 90 tablet 1     XIFAXAN 550 MG TABS tablet TAKE 1 TABLET(550 MG) BY MOUTH TWICE DAILY 60 tablet 0     furosemide (LASIX) 20 MG tablet Take 1 tablet (20 mg) by mouth daily 90 tablet 0         ALLERGIES:  Allergies   Allergen Reactions     Codeine Other (See Comments)     Cannot take due to liver  Cannot tolerate oral narcotics     Seasonal Allergies      Sneezing, coughing, runny and itchy eyes         PAST MEDICAL HISTORY:  Past Medical History:   Diagnosis Date     Anemia 2013    Low blood plates current is 37     Arthritis      BPH (benign prostatic hyperplasia)      CAD (coronary artery disease) 4/1/2019     Cholelithiasis      Conductive hearing loss 8/16/2017    Have a lump on my right side of my face.  Had wax discharge     Depressive disorder 1986    Suffer effects throughout life     Gastroesophageal reflux disease 12/1/2014    Being treated with Prilosac     HCC (hepatocellular carcinoma) (H) 1/22/2019     History of diabetic retinopathy 07/2018     HTN (hypertension)      Hyperlipidemia      Liver cirrhosis secondary to ESTRADA (H)      Portal vein thrombosis 4/11/2019     Type II diabetes mellitus (H)     Insulin adminstered BID daily.          PAST SURGICAL HISTORY:  Past Surgical History:   Procedure Laterality Date     COLONOSCOPY      2015     CV HEART CATHETERIZATION WITH POSSIBLE INTERVENTION N/A 2/26/2019    Procedure: CORS;  Surgeon: Jagdish Hoyt  MD;  Location:  HEART CARDIAC CATH LAB     ESOPHAGOSCOPY, GASTROSCOPY, DUODENOSCOPY (EGD), COMBINED N/A 11/17/2016    Procedure: COMBINED ESOPHAGOSCOPY, GASTROSCOPY, DUODENOSCOPY (EGD);  Surgeon: Santi Rosas MD;  Location:  GI     ESOPHAGOSCOPY, GASTROSCOPY, DUODENOSCOPY (EGD), COMBINED N/A 11/17/2017    Procedure: COMBINED ESOPHAGOSCOPY, GASTROSCOPY, DUODENOSCOPY (EGD);  EGD;  Surgeon: Santi Rosas MD;  Location:  GI     ESOPHAGOSCOPY, GASTROSCOPY, DUODENOSCOPY (EGD), COMBINED N/A 12/28/2018    Procedure: EGD;  Surgeon: Santi Rosas MD;  Location:  OR     HEAD & NECK SURGERY      12/2017 at [facility].      IMPLANT GOLD WEIGHT EYELID Right 11/16/2017    Procedure: IMPLANT WEIGHT EYELID;  Right Upper Eyelid Weight, right tarsal strip lower eyelid;  Surgeon: Milana Malave MD;  Location:  OR     IR CHEMO EMBOLIZATION  1/22/2019     KNEE SURGERY Left      ORTHOPEDIC SURGERY       PAROTIDECTOMY, RADICAL NECK DISSECTION Right 11/2/2017    Procedure: PAROTIDECTOMY, RADICAL NECK DISSECTION;  Right Superfacial Parotidectomy , Facial nerve repair. with [facility] facial nerve monitor.;  Surgeon: Asiya Morgan MD;  Location: [facility] OR     PICC INSERTION Left 11/06/2017    4fr SL BioFlo PICC, 44cm in the L basilic vein w/ tip in the low SVC     VASCULAR SURGERY           SOCIAL HISTORY:  Social History     Socioeconomic History     Marital status:      Spouse name: Not on file     Number of children: Not on file     Years of education: Not on file     Highest education level: Not on file   Occupational History     Not on file   Social Needs     Financial resource strain: Not on file     Food insecurity:     Worry: Not on file     Inability: Not on file     Transportation needs:     Medical: Not on file     Non-medical: Not on file   Tobacco Use     Smoking status: Never Smoker     Smokeless tobacco: Former User     Types: Chew     Tobacco comment: 1 tin per week   Substance and  Sexual Activity     Alcohol use: No     Alcohol/week: 0.0 standard drinks     Comment: quit 1996     Drug use: No     Sexual activity: Not Currently     Partners: Female     Birth control/protection: Condom   Lifestyle     Physical activity:     Days per week: Not on file     Minutes per session: Not on file     Stress: Not on file   Relationships     Social connections:     Talks on phone: Not on file     Gets together: Not on file     Attends Zoroastrian service: Not on file     Active member of club or organization: Not on file     Attends meetings of clubs or organizations: Not on file     Relationship status: Not on file     Intimate partner violence:     Fear of current or ex partner: Not on file     Emotionally abused: Not on file     Physically abused: Not on file     Forced sexual activity: Not on file   Other Topics Concern     Parent/sibling w/ CABG, MI or angioplasty before 65F 55M? Yes   Social History Narrative     Not on file         FAMILY HISTORY:  Family History   Problem Relation Age of Onset     Prostate Cancer Maternal Grandfather      Substance Abuse Maternal Grandfather         Alcohol     Colon Cancer Father 60     Pancreatic Cancer Father 60     Prostate Cancer Father      Colorectal Cancer Father      Macular Degeneration Father      Cancer Father      Glaucoma Father      Skin Cancer Father      Colorectal Cancer Maternal Grandmother      Cancer Maternal Grandmother      Substance Abuse Maternal Grandmother         Alcohol     Colorectal Cancer Paternal Grandmother      Cancer Mother      Diabetes Mother          3/2016     Cerebrovascular Disease Mother         Passed away in Feb of this year, 80 years old.     Thyroid Disease Mother      Depression Mother      Asthma Sister         Had since birth     Thyroid Disease Sister      Depression Sister      Liver Disease No family hx of      Melanoma No family hx of          PHYSICAL EXAM:  Vital signs:  BP 99/56   Pulse 63    "Temp 97.7  F (36.5  C) (Oral)   Resp 14   Ht 1.753 m (5' 9\")   Wt 78.4 kg (172 lb 12.8 oz)   SpO2 99%   BMI 25.52 kg/m      ECO  GENERAL/CONSTITUTIONAL: No acute distress.  EYES: No scleral icterus.  MUSCULOSKELETAL: Warm and well-perfused.  NEUROLOGIC: Alert, oriented, answers questions appropriately.  INTEGUMENTARY: No jaundice.      LABS:  CBC RESULTS:   Recent Labs   Lab Test 10/23/19  0847   WBC 3.1*   RBC 3.52*   HGB 12.6*   HCT 37.7*   *   MCH 35.8*   MCHC 33.4   RDW 14.0   PLT 40*     Recent Labs   Lab Test 10/23/19  0847 19  1300 19  1226    143 142   POTASSIUM 4.0  --  4.2   CHLORIDE 112*  --  114*   CO2 23  --  25   ANIONGAP 5  --  3   GLC 96  --  80   BUN 25  --  23   CR 1.09 1.17 1.35*   DEBORAH 8.5  --  8.7     Lab Results   Component Value Date    AST 51 10/23/2019     Lab Results   Component Value Date    ALT 54 10/23/2019     No results found for: BILICONJ   Lab Results   Component Value Date    BILITOTAL 1.0 10/23/2019     Lab Results   Component Value Date    ALBUMIN 2.8 10/23/2019     Lab Results   Component Value Date    PROTTOTAL 7.0 10/23/2019      Lab Results   Component Value Date    ALKPHOS 138 10/23/2019     INR 1.29    Component      Latest Ref Rng & Units 2019 2019 2019 10/23/2019   Alpha Fetoprotein      0 - 8 ug/L 5.5 4.3 5.6 3.7         IMAGING:  MRI abdomen 10/12/19:  1a. Posttreatment changes in segment 4 and segment 3 without any  evidence of recurrent disease LIRADS TR nonviable.    1b. No new worrisome lesion in the liver.  2. Cirrhosis with portal hypertension (ascites, portosystemic  collaterals and splenomegaly)  3. Stable right pleural effusion.  4. Nonocclusive thrombosis in the superior mesenteric vein and the  anterior aspect of the main portal vein, unchanged compared to prior  exams.  5. Based on this exam alone, the patient is within Watts criteria.    ASSESSMENT/PLAN:  Frandy Workman is a 53 year old male with:    1) " Hepatocellular carcinoma: Child-Clark B (7).  Patient has a 3.1 cm lesion in segment 4b and 1.1 cm lesion in segment 3.  His CT chest and bone scan were negative for metastatic disease.    He underwent TACE on 1/22/19 to segment IV lesion and CT-guided microwave ablation on 1/23/19 to segment III lesion.    I reviewed the MRI abdomen done on 10/23/19 and reviewed with the patient today.  There are post-treatment changes without any evidence of recurrence disease.  There is non-occlusive thrombosis in the SMV and anterior aspect of the main portal vein that is unchanged.    He is on liver transplant list.  He no longer follows with IR    -MRI and labs/AFP in 3 months  -RTC in 3 months with GI oncology MD    2) Cirrhosis: secondary to ESTRADA  -follows with hepatology - Dr. Banuelos    3) Pancytopenia: This has been a chronic issue and likely secondary to his history of liver disease.  Platelet count at baseline.    4) Diabetes:   -follows with endocrinology    Amanda Lees MD  Hematology/Oncology  [facility]

## 2019-10-28 NOTE — LETTER
10/28/2019       RE: Franyd Workman  [address] [facility] [address]     Dear Colleague,    Thank you for referring your patient, Frandy Workman, to the [facility] HEPATOLOGY at [facility]. Please see a copy of my visit note below.    [facility]    Hepatology follow-up    Follow-up visit for cirrhosis    Subjective:  54 year old male    Cirrhosis  - dx 2013  - ESTRADA, A1-AT phenotype- PiMZ  - hx HE  - hx ascites  - no hx variceal bleed  - last EGD Dec 2018- small EV, portal hypertensive gastropathy  - HCC screening- see below     HCC  - dx Dec 2018  - MRI liver 12/23/18- 3.1cm lesion segment IVB, 1.1cm lesion segment III  - AFP 12/28/18= 5.2  - bone scan 1/4/19  - CT chest 1/4/19  - TACE 1/22/19 (seg IV)  - Microwave ablation 1/23/19 (seg III)  - last MRI liver  10/23/19   - last AFP  10/23/19=  3.7    Patient comes to clinic this afternoon for follow-up of cirrhosis.  Patient denies new medications, ER visits or hospital admissions since last clinic visit.    Patient is well today.  He reports some loss of muscle mass in his arms over the past few months.  He denies any weakness or sensation changes.  He denies any symptoms specific to liver disease.    Patient denies jaundice, lower extremity edema, abdominal distension, lethargy or confusion.    Patient denies melena, hematemesis or hematochezia.    Patient denies fevers, sweats or chills.  Weight stable.    Patient does not drink alcohol.  He has friends coming in from [place] for the holiday season.      Medical hx Surgical hx   Past Medical History:   Diagnosis Date     Anemia 2013    Low blood plates current is 37     Arthritis      BPH (benign prostatic hyperplasia)      CAD (coronary artery disease) 4/1/2019     Cholelithiasis      Conductive hearing loss 8/16/2017    Have a lump on my right side of my face.  Had wax discharge     Depressive disorder 1986    Suffer effects throughout life      Gastroesophageal reflux disease 12/1/2014    Being treated with Prilosac     HCC (hepatocellular carcinoma) (H) 1/22/2019     History of diabetic retinopathy 07/2018     HTN (hypertension)      Hyperlipidemia      Liver cirrhosis secondary to ESTRADA (H)      Portal vein thrombosis 4/11/2019     Type II diabetes mellitus (H)     Insulin adminstered BID daily.       Past Surgical History:   Procedure Laterality Date     COLONOSCOPY      2015     CV HEART CATHETERIZATION WITH POSSIBLE INTERVENTION N/A 2/26/2019    Procedure: CORS;  Surgeon: Jagdish Hoyt MD;  Location:  HEART CARDIAC CATH LAB     ESOPHAGOSCOPY, GASTROSCOPY, DUODENOSCOPY (EGD), COMBINED N/A 11/17/2016    Procedure: COMBINED ESOPHAGOSCOPY, GASTROSCOPY, DUODENOSCOPY (EGD);  Surgeon: Santi Rosas MD;  Location:  GI     ESOPHAGOSCOPY, GASTROSCOPY, DUODENOSCOPY (EGD), COMBINED N/A 11/17/2017    Procedure: COMBINED ESOPHAGOSCOPY, GASTROSCOPY, DUODENOSCOPY (EGD);  EGD;  Surgeon: Santi Rosas MD;  Location:  GI     ESOPHAGOSCOPY, GASTROSCOPY, DUODENOSCOPY (EGD), COMBINED N/A 12/28/2018    Procedure: EGD;  Surgeon: Santi Rosas MD;  Location:  OR     HEAD & NECK SURGERY      12/2017 at [facility].      IMPLANT GOLD WEIGHT EYELID Right 11/16/2017    Procedure: IMPLANT WEIGHT EYELID;  Right Upper Eyelid Weight, right tarsal strip lower eyelid;  Surgeon: Milana Malave MD;  Location:  OR     IR CHEMO EMBOLIZATION  1/22/2019     KNEE SURGERY Left      ORTHOPEDIC SURGERY       PAROTIDECTOMY, RADICAL NECK DISSECTION Right 11/2/2017    Procedure: PAROTIDECTOMY, RADICAL NECK DISSECTION;  Right Superfacial Parotidectomy , Facial nerve repair. with [facility] facial nerve monitor.;  Surgeon: Asiya Morgan MD;  Location: [facility] OR     PICC INSERTION Left 11/06/2017    4fr SL BioFlo PICC, 44cm in the L basilic vein w/ tip in the low SVC     VASCULAR SURGERY            Medications  Prior to Admission medications    Medication Sig  Start Date End Date Taking? Authorizing Provider   alpha-lipoic acid 600 MG capsule Take 1 capsule (600 mg) by mouth daily 4/22/19  Yes Jennifer Wilcox MD   Artificial Tear Solution (SM ARTIFICIAL TEARS) SOLN Place 1 drop into the right eye every hour as needed Apply at least 4 times daily and as needed for dry eye 7/2/18  Yes Milana Malave MD   aspirin (ASA) 81 MG EC tablet Take 1 tablet (81 mg) by mouth daily 5/30/19  Yes Brenda Delgadillo MD   BD VIKTORIA U/F 32G X 4 MM insulin pen needle Use 5 per day 4/11/18  Yes Jennifer Wilcox MD   blood glucose monitoring (ACCU-CHEK EDINSON PLUS) test strip Use to test blood sugar 4 times daily 10/13/17  Yes Natalie Chun MD   blood glucose monitoring (ACCU-CHEK FASTCLIX) lancets Use to test blood sugar 4 times daily or as directed.  1 box = 102 lancets 10/13/17  Yes Natalie Chun MD   carvedilol (COREG) 12.5 MG tablet TAKE 1 TABLET(12.5 MG) BY MOUTH TWICE DAILY WITH MEALS 10/1/19  Yes Natalie Chun MD   Cholecalciferol (VITAMIN D-3 PO) Take 2,000 Units by mouth every morning    Yes Reported, Patient   cyanocobalamin (RA VITAMIN B-12 TR) 1000 MCG TBCR Take 5,000 mcg by mouth every morning  3/14/16  Yes Reported, Patient   dapagliflozin (FARXIGA) 10 MG TABS tablet Take 1 tablet (10 mg) by mouth daily 8/28/19  Yes Jennifer Wilcox MD   diclofenac (VOLTAREN) 1 % topical gel Place 2 g onto the skin 4 times daily 3/18/19  Yes Natalie Chun MD   econazole nitrate 1 % external cream Apply topically daily To feet and toenails. 7/31/19  Yes Lamin Gonzalez DPM   ferrous sulfate (FEROSUL) 325 (65 Fe) MG tablet Take 1 tablet (325 mg) by mouth 3 times daily (with meals) 9/5/19  Yes Santi Rosas MD   insulin degludec (TRESIBA) 200 UNIT/ML pen Take 70 units daily. 9/25/19  Yes Jennifer Wilcox MD   lactulose (CHRONULAC) 10 GM/15ML solution Take 45 mLs (30 g)  "by mouth 4 times daily 12/27/18  Yes Santi Rosas MD   metFORMIN (GLUCOPHAGE-XR) 500 MG 24 hr tablet Take 1 tablet (500 mg) by mouth daily (with breakfast) 8/28/19  Yes Jennifer Wilcox MD   Multiple Vitamin (THERAVITE PO) Take 1 tablet by mouth every morning  3/14/16  Yes Reported, Patient   NOVOLOG FLEXPEN 100 UNIT/ML soln INJECT 1 UNIT PER 4 GRAMS OF CARBS AT MEALS AND SNACKS. CORRECTION SCALE OF 1 UNITS PER 25 OVER [address]. AVE DOSE 75 UNITERS PER DAY 11/21/18  Yes Natalie Chun MD   omeprazole (PRILOSEC) 40 MG DR capsule Take 1 capsule (40 mg) by mouth daily 9/13/19  Yes Natalie Chun MD   potassium chloride ER (K-DUR/KLOR-CON M) 20 MEQ CR tablet Take 1 tablet (20 mEq) by mouth daily 3/18/19  Yes Natalie Chun MD   rosuvastatin (CRESTOR) 20 MG tablet Take 1 tablet (20 mg) by mouth daily 9/24/19  Yes Snati Rosas MD   tamsulosin (FLOMAX) 0.4 MG capsule TAKE 1 CAPSULE(0.4 MG) BY MOUTH DAILY 10/1/19  Yes Natalie Chun MD   traZODone (DESYREL) 50 MG tablet Take 1 tablet (50 mg) by mouth At Bedtime 6/7/19  Yes Natalie Chun MD   XIFAXAN 550 MG TABS tablet TAKE 1 TABLET(550 MG) BY MOUTH TWICE DAILY 10/23/19  Yes Santi Rosas MD   OLANZapine (ZYPREXA) 2.5 MG tablet TAKE 1 TABLET BY MOUTH EVERY NIGHT AT BEDTIME 10/24/16   Reported, Patient       Allergies  Allergies   Allergen Reactions     Codeine Other (See Comments)     Cannot take due to liver  Cannot tolerate oral narcotics     Seasonal Allergies      Sneezing, coughing, runny and itchy eyes       Review of systems  A 10-point review of systems was negative    Examination  BP 99/56   Pulse 63   Temp 97.7  F (36.5  C) (Oral)   Ht 1.753 m (5' 9\")   Wt 78 kg (172 lb)   SpO2 99%   BMI 25.40 kg/m     Body mass index is 25.4 kg/m .    Gen- well, NAD, A+Ox3, normal color  CVS- RRR  RS- CTA  Abd- central obesity, striae, soft, non-tender, no ascites " or organomegaly on palpation or percussion, BS+  Extr- hands normal, no LEILA  Skin- no rash or jaundice  Neuro- no asterixis  Psych- normal mood    Laboratory  Lab Results   Component Value Date     10/23/2019    POTASSIUM 4.0 10/23/2019    CHLORIDE 112 10/23/2019    CO2 23 10/23/2019    BUN 25 10/23/2019    CR 1.09 10/23/2019       Lab Results   Component Value Date    BILITOTAL 1.0 10/23/2019    ALT 54 10/23/2019    AST 51 10/23/2019    ALKPHOS 138 10/23/2019       Lab Results   Component Value Date    ALBUMIN 2.8 10/23/2019    PROTTOTAL 7.0 10/23/2019        Lab Results   Component Value Date    WBC 3.1 10/23/2019    HGB 12.6 10/23/2019     10/23/2019    PLT 40 10/23/2019       Lab Results   Component Value Date    INR 1.29 10/23/2019       Radiology  MRI liver 10/23/19 reviewed    Assessment  54 year old male listed for liver transplant who presents for routine follow-up of decompensated ESTRADA cirrhosis complicated with ascites, hepatic encephalopathy and HCC.  MELD-Na= 10 (stable).  UNOS MELD-Na= 29 with tumor exception points.  No evidence of ascites.  Hepatic encephalopathy controlled on current medications. Renal function back to normal off diuretics.  Will check serum total testosterone for muscle loss in context of history of cirrhosis.  Up to date with surveillance of esophageal varices.    Plan  1.  Check serum total testosterone (between 8 and 10am)  2.  Low Na diet  3.  Continue lactulose and rifaximin  4.  Follow-up in 3 months    Santi Banuelos MD  Hepatology  [facility]    Again, thank you for allowing me to participate in the care of your patient.      Sincerely,    Santi Banuelos MD

## 2019-10-28 NOTE — LETTER
10/28/2019      RE: Frandy Workman  [address]  [facility] [address]       [facility]    Hepatology follow-up    Follow-up visit for cirrhosis    Subjective:  54 year old male    Cirrhosis  - dx 2013  - ESTRADA, A1-AT phenotype- PiMZ  - hx HE  - hx ascites  - no hx variceal bleed  - last EGD Dec 2018- small EV, portal hypertensive gastropathy  - HCC screening- see below     HCC  - dx Dec 2018  - MRI liver 12/23/18- 3.1cm lesion segment IVB, 1.1cm lesion segment III  - AFP 12/28/18= 5.2  - bone scan 1/4/19  - CT chest 1/4/19  - TACE 1/22/19 (seg IV)  - Microwave ablation 1/23/19 (seg III)  - last MRI liver  10/23/19   - last AFP  10/23/19=  3.7    Patient comes to clinic this afternoon for follow-up of cirrhosis.  Patient denies new medications, ER visits or hospital admissions since last clinic visit.    Patient is well today.  He reports some loss of muscle mass in his arms over the past few months.  He denies any weakness or sensation changes.  He denies any symptoms specific to liver disease.    Patient denies jaundice, lower extremity edema, abdominal distension, lethargy or confusion.    Patient denies melena, hematemesis or hematochezia.    Patient denies fevers, sweats or chills.  Weight stable.    Patient does not drink alcohol.  He has friends coming in from [place] for the holiday season.      Medical hx Surgical hx   Past Medical History:   Diagnosis Date     Anemia 2013    Low blood plates current is 37     Arthritis      BPH (benign prostatic hyperplasia)      CAD (coronary artery disease) 4/1/2019     Cholelithiasis      Conductive hearing loss 8/16/2017    Have a lump on my right side of my face.  Had wax discharge     Depressive disorder 1986    Suffer effects throughout life     Gastroesophageal reflux disease 12/1/2014    Being treated with Prilosac     HCC (hepatocellular carcinoma) (H) 1/22/2019     History of diabetic retinopathy 07/2018     HTN (hypertension)       Hyperlipidemia      Liver cirrhosis secondary to ESTRADA (H)      Portal vein thrombosis 4/11/2019     Type II diabetes mellitus (H)     Insulin adminstered BID daily.       Past Surgical History:   Procedure Laterality Date     COLONOSCOPY      2015     CV HEART CATHETERIZATION WITH POSSIBLE INTERVENTION N/A 2/26/2019    Procedure: CORS;  Surgeon: Jagdish Hoyt MD;  Location:  HEART CARDIAC CATH LAB     ESOPHAGOSCOPY, GASTROSCOPY, DUODENOSCOPY (EGD), COMBINED N/A 11/17/2016    Procedure: COMBINED ESOPHAGOSCOPY, GASTROSCOPY, DUODENOSCOPY (EGD);  Surgeon: Santi Rosas MD;  Location:  GI     ESOPHAGOSCOPY, GASTROSCOPY, DUODENOSCOPY (EGD), COMBINED N/A 11/17/2017    Procedure: COMBINED ESOPHAGOSCOPY, GASTROSCOPY, DUODENOSCOPY (EGD);  EGD;  Surgeon: Satni Rosas MD;  Location:  GI     ESOPHAGOSCOPY, GASTROSCOPY, DUODENOSCOPY (EGD), COMBINED N/A 12/28/2018    Procedure: EGD;  Surgeon: Santi Rosas MD;  Location:  OR     HEAD & NECK SURGERY      12/2017 at [facility].      IMPLANT GOLD WEIGHT EYELID Right 11/16/2017    Procedure: IMPLANT WEIGHT EYELID;  Right Upper Eyelid Weight, right tarsal strip lower eyelid;  Surgeon: Milana Malave MD;  Location:  OR     IR CHEMO EMBOLIZATION  1/22/2019     KNEE SURGERY Left      ORTHOPEDIC SURGERY       PAROTIDECTOMY, RADICAL NECK DISSECTION Right 11/2/2017    Procedure: PAROTIDECTOMY, RADICAL NECK DISSECTION;  Right Superfacial Parotidectomy , Facial nerve repair. with [facility] facial nerve monitor.;  Surgeon: Asiya Morgan MD;  Location: [facility] OR     PICC INSERTION Left 11/06/2017    4fr SL BioFlo PICC, 44cm in the L basilic vein w/ tip in the low SVC     VASCULAR SURGERY            Medications  Prior to Admission medications    Medication Sig Start Date End Date Taking? Authorizing Provider   alpha-lipoic acid 600 MG capsule Take 1 capsule (600 mg) by mouth daily 4/22/19  Yes Jennifer Wilcox MD   Artificial Tear Solution  (SM ARTIFICIAL TEARS) SOLN Place 1 drop into the right eye every hour as needed Apply at least 4 times daily and as needed for dry eye 7/2/18  Yes Milana Malave MD   aspirin (ASA) 81 MG EC tablet Take 1 tablet (81 mg) by mouth daily 5/30/19  Yes Brenda Delgadillo MD   BD VIKTORIA U/F 32G X 4 MM insulin pen needle Use 5 per day 4/11/18  Yes Jennifer Wilcox MD   blood glucose monitoring (ACCU-CHEK EDINSON PLUS) test strip Use to test blood sugar 4 times daily 10/13/17  Yes Natalie Chun MD   blood glucose monitoring (ACCU-CHEK FASTCLIX) lancets Use to test blood sugar 4 times daily or as directed.  1 box = 102 lancets 10/13/17  Yes Natalie Chun MD   carvedilol (COREG) 12.5 MG tablet TAKE 1 TABLET(12.5 MG) BY MOUTH TWICE DAILY WITH MEALS 10/1/19  Yes Natalie Chun MD   Cholecalciferol (VITAMIN D-3 PO) Take 2,000 Units by mouth every morning    Yes Reported, Patient   cyanocobalamin (RA VITAMIN B-12 TR) 1000 MCG TBCR Take 5,000 mcg by mouth every morning  3/14/16  Yes Reported, Patient   dapagliflozin (FARXIGA) 10 MG TABS tablet Take 1 tablet (10 mg) by mouth daily 8/28/19  Yes Jennifer Wilcox MD   diclofenac (VOLTAREN) 1 % topical gel Place 2 g onto the skin 4 times daily 3/18/19  Yes Natalie Chun MD   econazole nitrate 1 % external cream Apply topically daily To feet and toenails. 7/31/19  Yes Lamin Gonzalez DPM   ferrous sulfate (FEROSUL) 325 (65 Fe) MG tablet Take 1 tablet (325 mg) by mouth 3 times daily (with meals) 9/5/19  Yes Santi Rosas MD   insulin degludec (TRESIBA) 200 UNIT/ML pen Take 70 units daily. 9/25/19  Yes Jennifer Wilcox MD   lactulose (CHRONULAC) 10 GM/15ML solution Take 45 mLs (30 g) by mouth 4 times daily 12/27/18  Yes Santi Rosas MD   metFORMIN (GLUCOPHAGE-XR) 500 MG 24 hr tablet Take 1 tablet (500 mg) by mouth daily (with breakfast) 8/28/19  Yes Jeri  "Jennifer Varner MD   Multiple Vitamin (THERAVITE PO) Take 1 tablet by mouth every morning  3/14/16  Yes Reported, Patient   NOVOLOG FLEXPEN 100 UNIT/ML soln INJECT 1 UNIT PER 4 GRAMS OF CARBS AT MEALS AND SNACKS. CORRECTION SCALE OF 1 UNITS PER 25 OVER [address]. AVE DOSE 75 UNITERS PER DAY 11/21/18  Yes Natalie Chun MD   omeprazole (PRILOSEC) 40 MG DR capsule Take 1 capsule (40 mg) by mouth daily 9/13/19  Yes Natalie Chun MD   potassium chloride ER (K-DUR/KLOR-CON M) 20 MEQ CR tablet Take 1 tablet (20 mEq) by mouth daily 3/18/19  Yes Natalie hCun MD   rosuvastatin (CRESTOR) 20 MG tablet Take 1 tablet (20 mg) by mouth daily 9/24/19  Yes Santi Rosas MD   tamsulosin (FLOMAX) 0.4 MG capsule TAKE 1 CAPSULE(0.4 MG) BY MOUTH DAILY 10/1/19  Yes Natalie Chun MD   traZODone (DESYREL) 50 MG tablet Take 1 tablet (50 mg) by mouth At Bedtime 6/7/19  Yes Natalie Chun MD   XIFAXAN 550 MG TABS tablet TAKE 1 TABLET(550 MG) BY MOUTH TWICE DAILY 10/23/19  Yes Santi Rosas MD   OLANZapine (ZYPREXA) 2.5 MG tablet TAKE 1 TABLET BY MOUTH EVERY NIGHT AT BEDTIME 10/24/16   Reported, Patient       Allergies  Allergies   Allergen Reactions     Codeine Other (See Comments)     Cannot take due to liver  Cannot tolerate oral narcotics     Seasonal Allergies      Sneezing, coughing, runny and itchy eyes       Review of systems  A 10-point review of systems was negative    Examination  BP 99/56   Pulse 63   Temp 97.7  F (36.5  C) (Oral)   Ht 1.753 m (5' 9\")   Wt 78 kg (172 lb)   SpO2 99%   BMI 25.40 kg/m     Body mass index is 25.4 kg/m .    Gen- well, NAD, A+Ox3, normal color  CVS- RRR  RS- CTA  Abd- central obesity, striae, soft, non-tender, no ascites or organomegaly on palpation or percussion, BS+  Extr- hands normal, no LEILA  Skin- no rash or jaundice  Neuro- no asterixis  Psych- normal mood    Laboratory  Lab Results   Component Value " Date     10/23/2019    POTASSIUM 4.0 10/23/2019    CHLORIDE 112 10/23/2019    CO2 23 10/23/2019    BUN 25 10/23/2019    CR 1.09 10/23/2019       Lab Results   Component Value Date    BILITOTAL 1.0 10/23/2019    ALT 54 10/23/2019    AST 51 10/23/2019    ALKPHOS 138 10/23/2019       Lab Results   Component Value Date    ALBUMIN 2.8 10/23/2019    PROTTOTAL 7.0 10/23/2019        Lab Results   Component Value Date    WBC 3.1 10/23/2019    HGB 12.6 10/23/2019     10/23/2019    PLT 40 10/23/2019       Lab Results   Component Value Date    INR 1.29 10/23/2019       Radiology  MRI liver 10/23/19 reviewed    Assessment  54 year old male listed for liver transplant who presents for routine follow-up of decompensated ESTRADA cirrhosis complicated with ascites, hepatic encephalopathy and HCC.  MELD-Na= 10 (stable).  UNOS MELD-Na= 29 with tumor exception points.  No evidence of ascites.  Hepatic encephalopathy controlled on current medications. Renal function back to normal off diuretics.  Will check serum total testosterone for muscle loss in context of history of cirrhosis.  Up to date with surveillance of esophageal varices.    Plan  1.  Check serum total testosterone (between 8 and 10am)  2.  Low Na diet  3.  Continue lactulose and rifaximin  4.  Follow-up in 3 months    Santi Banuelos MD  Hepatology  [facility]    Santi Banuelos MD

## 2019-11-10 ENCOUNTER — APPOINTMENT (OUTPATIENT)
Dept: GENERAL RADIOLOGY | Facility: CLINIC | Age: 54
DRG: 005 | End: 2019-11-10
Attending: SURGERY
Payer: MEDICARE

## 2019-11-10 ENCOUNTER — ANESTHESIA EVENT (OUTPATIENT)
Dept: SURGERY | Facility: CLINIC | Age: 54
DRG: 005 | End: 2019-11-10
Payer: MEDICARE

## 2019-11-10 ENCOUNTER — ORGAN (OUTPATIENT)
Dept: TRANSPLANT | Facility: CLINIC | Age: 54
End: 2019-11-10

## 2019-11-10 ENCOUNTER — HOSPITAL ENCOUNTER (INPATIENT)
Facility: CLINIC | Age: 54
LOS: 9 days | Discharge: HOME HEALTH | DRG: 005 | End: 2019-11-20
Attending: SURGERY | Admitting: SURGERY
Payer: MEDICARE

## 2019-11-10 DIAGNOSIS — E11.65 TYPE 2 DIABETES MELLITUS WITH HYPERGLYCEMIA, WITH LONG-TERM CURRENT USE OF INSULIN (H): ICD-10-CM

## 2019-11-10 DIAGNOSIS — E78.5 HYPERLIPIDEMIA, UNSPECIFIED HYPERLIPIDEMIA TYPE: ICD-10-CM

## 2019-11-10 DIAGNOSIS — Z79.4 TYPE 2 DIABETES MELLITUS WITH HYPERGLYCEMIA, WITH LONG-TERM CURRENT USE OF INSULIN (H): ICD-10-CM

## 2019-11-10 DIAGNOSIS — Z94.4 LIVER TRANSPLANT RECIPIENT (H): Primary | ICD-10-CM

## 2019-11-10 LAB
ALBUMIN SERPL-MCNC: 2.8 G/DL (ref 3.4–5)
ALP SERPL-CCNC: 180 U/L (ref 40–150)
ALT SERPL W P-5'-P-CCNC: 64 U/L (ref 0–70)
AMYLASE SERPL-CCNC: 41 U/L (ref 30–110)
ANION GAP SERPL CALCULATED.3IONS-SCNC: 4 MMOL/L (ref 3–14)
APTT PPP: 32 SEC (ref 22–37)
AST SERPL W P-5'-P-CCNC: 64 U/L (ref 0–45)
BASOPHILS # BLD AUTO: 0 10E9/L (ref 0–0.2)
BASOPHILS NFR BLD AUTO: 0.3 %
BILIRUB SERPL-MCNC: 1.4 MG/DL (ref 0.2–1.3)
BUN SERPL-MCNC: 18 MG/DL (ref 7–30)
CALCIUM SERPL-MCNC: 9.3 MG/DL (ref 8.5–10.1)
CHLORIDE SERPL-SCNC: 112 MMOL/L (ref 94–109)
CO2 SERPL-SCNC: 24 MMOL/L (ref 20–32)
CREAT SERPL-MCNC: 1.18 MG/DL (ref 0.66–1.25)
DIFFERENTIAL METHOD BLD: ABNORMAL
EOSINOPHIL # BLD AUTO: 0.1 10E9/L (ref 0–0.7)
EOSINOPHIL NFR BLD AUTO: 3.2 %
ERYTHROCYTE [DISTWIDTH] IN BLOOD BY AUTOMATED COUNT: 14 % (ref 10–15)
FIBRINOGEN PPP-MCNC: 219 MG/DL (ref 200–420)
GFR SERPL CREATININE-BSD FRML MDRD: 69 ML/MIN/{1.73_M2}
GLUCOSE BLDC GLUCOMTR-MCNC: 167 MG/DL (ref 70–99)
GLUCOSE BLDC GLUCOMTR-MCNC: 172 MG/DL (ref 70–99)
GLUCOSE BLDC GLUCOMTR-MCNC: 87 MG/DL (ref 70–99)
GLUCOSE SERPL-MCNC: 154 MG/DL (ref 70–99)
HCT VFR BLD AUTO: 36.9 % (ref 40–53)
HGB BLD-MCNC: 12.4 G/DL (ref 13.3–17.7)
IMM GRANULOCYTES # BLD: 0 10E9/L (ref 0–0.4)
IMM GRANULOCYTES NFR BLD: 0.3 %
INR PPP: 1.31 (ref 0.86–1.14)
LYMPHOCYTES # BLD AUTO: 0.5 10E9/L (ref 0.8–5.3)
LYMPHOCYTES NFR BLD AUTO: 14.6 %
MAGNESIUM SERPL-MCNC: 2.1 MG/DL (ref 1.6–2.3)
MCH RBC QN AUTO: 36.4 PG (ref 26.5–33)
MCHC RBC AUTO-ENTMCNC: 33.6 G/DL (ref 31.5–36.5)
MCV RBC AUTO: 108 FL (ref 78–100)
MONOCYTES # BLD AUTO: 0.3 10E9/L (ref 0–1.3)
MONOCYTES NFR BLD AUTO: 10.5 %
NEUTROPHILS # BLD AUTO: 2.2 10E9/L (ref 1.6–8.3)
NEUTROPHILS NFR BLD AUTO: 71.1 %
NRBC # BLD AUTO: 0 10*3/UL
NRBC BLD AUTO-RTO: 0 /100
PHOSPHATE SERPL-MCNC: 2.5 MG/DL (ref 2.5–4.5)
PLATELET # BLD AUTO: 35 10E9/L (ref 150–450)
PLATELET # BLD EST: ABNORMAL 10*3/UL
POTASSIUM SERPL-SCNC: 4.3 MMOL/L (ref 3.4–5.3)
PROT SERPL-MCNC: 7.2 G/DL (ref 6.8–8.8)
RBC # BLD AUTO: 3.41 10E12/L (ref 4.4–5.9)
SODIUM SERPL-SCNC: 140 MMOL/L (ref 133–144)
WBC # BLD AUTO: 3.2 10E9/L (ref 4–11)

## 2019-11-10 PROCEDURE — 86706 HEP B SURFACE ANTIBODY: CPT | Performed by: STUDENT IN AN ORGANIZED HEALTH CARE EDUCATION/TRAINING PROGRAM

## 2019-11-10 PROCEDURE — 40000065 ZZH STATISTIC EKG NON-CHARGEABLE

## 2019-11-10 PROCEDURE — 82962 GLUCOSE BLOOD TEST: CPT

## 2019-11-10 PROCEDURE — 85730 THROMBOPLASTIN TIME PARTIAL: CPT | Performed by: STUDENT IN AN ORGANIZED HEALTH CARE EDUCATION/TRAINING PROGRAM

## 2019-11-10 PROCEDURE — 86645 CMV ANTIBODY IGM: CPT | Performed by: STUDENT IN AN ORGANIZED HEALTH CARE EDUCATION/TRAINING PROGRAM

## 2019-11-10 PROCEDURE — 80053 COMPREHEN METABOLIC PANEL: CPT | Performed by: STUDENT IN AN ORGANIZED HEALTH CARE EDUCATION/TRAINING PROGRAM

## 2019-11-10 PROCEDURE — 85384 FIBRINOGEN ACTIVITY: CPT | Performed by: STUDENT IN AN ORGANIZED HEALTH CARE EDUCATION/TRAINING PROGRAM

## 2019-11-10 PROCEDURE — 82150 ASSAY OF AMYLASE: CPT | Performed by: STUDENT IN AN ORGANIZED HEALTH CARE EDUCATION/TRAINING PROGRAM

## 2019-11-10 PROCEDURE — 86850 RBC ANTIBODY SCREEN: CPT | Performed by: STUDENT IN AN ORGANIZED HEALTH CARE EDUCATION/TRAINING PROGRAM

## 2019-11-10 PROCEDURE — 86704 HEP B CORE ANTIBODY TOTAL: CPT | Performed by: STUDENT IN AN ORGANIZED HEALTH CARE EDUCATION/TRAINING PROGRAM

## 2019-11-10 PROCEDURE — 25000132 ZZH RX MED GY IP 250 OP 250 PS 637: Mod: GY | Performed by: STUDENT IN AN ORGANIZED HEALTH CARE EDUCATION/TRAINING PROGRAM

## 2019-11-10 PROCEDURE — 87389 HIV-1 AG W/HIV-1&-2 AB AG IA: CPT | Performed by: STUDENT IN AN ORGANIZED HEALTH CARE EDUCATION/TRAINING PROGRAM

## 2019-11-10 PROCEDURE — 85025 COMPLETE CBC W/AUTO DIFF WBC: CPT | Performed by: STUDENT IN AN ORGANIZED HEALTH CARE EDUCATION/TRAINING PROGRAM

## 2019-11-10 PROCEDURE — 86665 EPSTEIN-BARR CAPSID VCA: CPT | Performed by: STUDENT IN AN ORGANIZED HEALTH CARE EDUCATION/TRAINING PROGRAM

## 2019-11-10 PROCEDURE — 40000141 ZZH STATISTIC PERIPHERAL IV START W/O US GUIDANCE

## 2019-11-10 PROCEDURE — 85610 PROTHROMBIN TIME: CPT | Performed by: STUDENT IN AN ORGANIZED HEALTH CARE EDUCATION/TRAINING PROGRAM

## 2019-11-10 PROCEDURE — 36415 COLL VENOUS BLD VENIPUNCTURE: CPT | Performed by: STUDENT IN AN ORGANIZED HEALTH CARE EDUCATION/TRAINING PROGRAM

## 2019-11-10 PROCEDURE — 86900 BLOOD TYPING SEROLOGIC ABO: CPT | Performed by: STUDENT IN AN ORGANIZED HEALTH CARE EDUCATION/TRAINING PROGRAM

## 2019-11-10 PROCEDURE — 86923 COMPATIBILITY TEST ELECTRIC: CPT | Performed by: STUDENT IN AN ORGANIZED HEALTH CARE EDUCATION/TRAINING PROGRAM

## 2019-11-10 PROCEDURE — 86644 CMV ANTIBODY: CPT | Performed by: STUDENT IN AN ORGANIZED HEALTH CARE EDUCATION/TRAINING PROGRAM

## 2019-11-10 PROCEDURE — 86901 BLOOD TYPING SEROLOGIC RH(D): CPT | Performed by: STUDENT IN AN ORGANIZED HEALTH CARE EDUCATION/TRAINING PROGRAM

## 2019-11-10 PROCEDURE — G0472 HEP C SCREEN HIGH RISK/OTHER: HCPCS | Performed by: STUDENT IN AN ORGANIZED HEALTH CARE EDUCATION/TRAINING PROGRAM

## 2019-11-10 PROCEDURE — 83735 ASSAY OF MAGNESIUM: CPT | Performed by: STUDENT IN AN ORGANIZED HEALTH CARE EDUCATION/TRAINING PROGRAM

## 2019-11-10 PROCEDURE — 84100 ASSAY OF PHOSPHORUS: CPT | Performed by: STUDENT IN AN ORGANIZED HEALTH CARE EDUCATION/TRAINING PROGRAM

## 2019-11-10 PROCEDURE — 71046 X-RAY EXAM CHEST 2 VIEWS: CPT

## 2019-11-10 PROCEDURE — 93010 ELECTROCARDIOGRAM REPORT: CPT | Performed by: INTERNAL MEDICINE

## 2019-11-10 RX ORDER — PIPERACILLIN SODIUM, TAZOBACTAM SODIUM 3; .375 G/15ML; G/15ML
3.38 INJECTION, POWDER, LYOPHILIZED, FOR SOLUTION INTRAVENOUS ONCE
Status: COMPLETED | OUTPATIENT
Start: 2019-11-10 | End: 2019-11-11

## 2019-11-10 RX ORDER — TRAZODONE HYDROCHLORIDE 50 MG/1
50 TABLET, FILM COATED ORAL AT BEDTIME
Status: DISCONTINUED | OUTPATIENT
Start: 2019-11-10 | End: 2019-11-15

## 2019-11-10 RX ORDER — NICOTINE POLACRILEX 4 MG
15-30 LOZENGE BUCCAL
Status: DISCONTINUED | OUTPATIENT
Start: 2019-11-10 | End: 2019-11-20 | Stop reason: HOSPADM

## 2019-11-10 RX ORDER — NOREPINEPHRINE BITARTRATE 0.06 MG/ML
0.03-0.4 INJECTION, SOLUTION INTRAVENOUS CONTINUOUS
Status: DISCONTINUED | OUTPATIENT
Start: 2019-11-10 | End: 2019-11-11

## 2019-11-10 RX ORDER — PIPERACILLIN SODIUM, TAZOBACTAM SODIUM 2; .25 G/10ML; G/10ML
2.25 INJECTION, POWDER, LYOPHILIZED, FOR SOLUTION INTRAVENOUS
Status: DISCONTINUED | OUTPATIENT
Start: 2019-11-10 | End: 2019-11-11 | Stop reason: HOSPADM

## 2019-11-10 RX ORDER — DEXTROSE MONOHYDRATE 25 G/50ML
25-50 INJECTION, SOLUTION INTRAVENOUS
Status: DISCONTINUED | OUTPATIENT
Start: 2019-11-10 | End: 2019-11-20 | Stop reason: HOSPADM

## 2019-11-10 RX ORDER — CARVEDILOL 12.5 MG/1
12.5 TABLET ORAL 2 TIMES DAILY WITH MEALS
Status: DISCONTINUED | OUTPATIENT
Start: 2019-11-10 | End: 2019-11-14

## 2019-11-10 RX ORDER — LIDOCAINE 40 MG/G
CREAM TOPICAL
Status: DISCONTINUED | OUTPATIENT
Start: 2019-11-10 | End: 2019-11-11 | Stop reason: HOSPADM

## 2019-11-10 RX ORDER — OLANZAPINE 2.5 MG/1
2.5 TABLET, FILM COATED ORAL AT BEDTIME
Status: DISCONTINUED | OUTPATIENT
Start: 2019-11-10 | End: 2019-11-18

## 2019-11-10 RX ORDER — DAPAGLIFLOZIN 10 MG/1
10 TABLET, FILM COATED ORAL DAILY
Status: DISCONTINUED | OUTPATIENT
Start: 2019-11-10 | End: 2019-11-11

## 2019-11-10 RX ADMIN — Medication 600 MG: at 23:07

## 2019-11-10 RX ADMIN — CARVEDILOL 12.5 MG: 12.5 TABLET, FILM COATED ORAL at 23:07

## 2019-11-10 RX ADMIN — TRAZODONE HYDROCHLORIDE 50 MG: 50 TABLET ORAL at 23:07

## 2019-11-10 ASSESSMENT — PAIN DESCRIPTION - DESCRIPTORS
DESCRIPTORS: ACHING
DESCRIPTORS: ACHING

## 2019-11-10 NOTE — H&P
[facility] [place]    Transplant Surgery  History and Physical    Frandy Workman  : 1965  MRN # [id number]    ADMIT DATE: 11/10/2019    PCP: Natalie Chun    CHIEF COMPLAINT: Possible liver transplant recipient    HPI: Frandy Workman is a 54 year old male with PMHx significant for cirrhosis secondary to ESTRADA diagnosed in , HCC 2018, HTN, HLD, GERD, BPH and DMII who presents to [facility] for a possible liver transplant. He follows with Dr. Banuelos of Hepatology here at the  and received a call this morning with a possible  donor for liver transplantation. He denies any recent acute illnesses. Feels okay overall. Chronic cough at night. Generalized weakness. Otherwise no complaints.     The following taken from last visit with Dr. Storey 10/28/19:  Cirrhosis  - dx   - ESTRADA, A1-AT phenotype- PiMZ  - hx HE  - hx ascites  - no hx variceal bleed  - last EGD Dec 2018- small EV, portal hypertensive gastropathy  - HCC screening- see below     HCC  - dx Dec 2018  - MRI liver 18- 3.1cm lesion segment IVB, 1.1cm lesion segment III  - AFP 18= 5.2  - bone scan 19  - CT chest 19  - TACE 19 (seg IV)  - Microwave ablation 19 (seg III)  - last MRI liver 10/23/19   - last AFP 10/23/19= 3.7    ROS:   CONSTITUTIONAL: Denies fever, chills  HEAD: Denies headache  EENT: Denies vision changes, sore throat  NECK: Denies neck pain  CV: Denies chest pain, palpitations  PULMONARY: + cough. Denies sob.   GI: Denies nausea, vomiting, diarrhea, and abdominal pain  : Denies urinary changes  EXT: Denies lower extremity edema.   SKIN: Denies abnormal rashes   MUSCULOSKELETAL: Denies msk upper or lower extremity pain  NEUROLOGIC: Denies dizziness, focal weakness  HEMATOLOGICAL: No abnormal bleeding  PSYCHIATRIC: Denies any mood alterations.     PMH:  Past Medical History:   Diagnosis Date     Anemia 2013    Low blood plates current is 37     Arthritis      BPH  (benign prostatic hyperplasia)      CAD (coronary artery disease) 4/1/2019     Cholelithiasis      Conductive hearing loss 8/16/2017    Have a lump on my right side of my face.  Had wax discharge     Depressive disorder 1986    Suffer effects throughout life     Gastroesophageal reflux disease 12/1/2014    Being treated with Prilosac     HCC (hepatocellular carcinoma) (H) 1/22/2019     History of diabetic retinopathy 07/2018     HTN (hypertension)      Hyperlipidemia      Liver cirrhosis secondary to ESTRADA (H)      Portal vein thrombosis 4/11/2019     Type II diabetes mellitus (H)     Insulin adminstered BID daily.        PSH:  Past Surgical History:   Procedure Laterality Date     COLONOSCOPY      2015     CV HEART CATHETERIZATION WITH POSSIBLE INTERVENTION N/A 2/26/2019    Procedure: CORS;  Surgeon: Jagdish Hoyt MD;  Location:  HEART CARDIAC CATH LAB     ESOPHAGOSCOPY, GASTROSCOPY, DUODENOSCOPY (EGD), COMBINED N/A 11/17/2016    Procedure: COMBINED ESOPHAGOSCOPY, GASTROSCOPY, DUODENOSCOPY (EGD);  Surgeon: Santi Rosas MD;  Location:  GI     ESOPHAGOSCOPY, GASTROSCOPY, DUODENOSCOPY (EGD), COMBINED N/A 11/17/2017    Procedure: COMBINED ESOPHAGOSCOPY, GASTROSCOPY, DUODENOSCOPY (EGD);  EGD;  Surgeon: Santi Rosas MD;  Location:  GI     ESOPHAGOSCOPY, GASTROSCOPY, DUODENOSCOPY (EGD), COMBINED N/A 12/28/2018    Procedure: EGD;  Surgeon: Santi Rosas MD;  Location:  OR     HEAD & NECK SURGERY      12/2017 at [facility].      IMPLANT GOLD WEIGHT EYELID Right 11/16/2017    Procedure: IMPLANT WEIGHT EYELID;  Right Upper Eyelid Weight, right tarsal strip lower eyelid;  Surgeon: Milana Malave MD;  Location:  OR     IR CHEMO EMBOLIZATION  1/22/2019     KNEE SURGERY Left      ORTHOPEDIC SURGERY       PAROTIDECTOMY, RADICAL NECK DISSECTION Right 11/2/2017    Procedure: PAROTIDECTOMY, RADICAL NECK DISSECTION;  Right Superfacial Parotidectomy , Facial nerve repair. with [facility] facial  nerve monitor.;  Surgeon: Asiya Morgan MD;  Location: [facility] OR     PICC INSERTION Left 11/06/2017    4fr SL BioFlo PICC, 44cm in the L basilic vein w/ tip in the low SVC     VASCULAR SURGERY         MEDICATIONS:  Prior to Admission Medications   Prescriptions Last Dose Informant Patient Reported? Taking?   Artificial Tear Solution (SM ARTIFICIAL TEARS) SOLN   No No   Sig: Place 1 drop into the right eye every hour as needed Apply at least 4 times daily and as needed for dry eye   BD VIKTORIA U/F 32G X 4 MM insulin pen needle   No No   Sig: Use 5 per day   Cholecalciferol (VITAMIN D-3 PO)   Yes No   Sig: Take 2,000 Units by mouth every morning    Multiple Vitamin (THERAVITE PO)   Yes No   Sig: Take 1 tablet by mouth every morning    NOVOLOG FLEXPEN 100 UNIT/ML soln   No No   Sig: INJECT 1 UNIT PER 4 GRAMS OF CARBS AT MEALS AND SNACKS. CORRECTION SCALE OF 1 UNITS PER 25 OVER [address]. AVE DOSE 75 UNITERS PER DAY   OLANZapine (ZYPREXA) 2.5 MG tablet   Yes No   Sig: TAKE 1 TABLET BY MOUTH EVERY NIGHT AT BEDTIME   XIFAXAN 550 MG TABS tablet   No No   Sig: TAKE 1 TABLET(550 MG) BY MOUTH TWICE DAILY   alpha-lipoic acid 600 MG capsule   No No   Sig: Take 1 capsule (600 mg) by mouth daily   aspirin (ASA) 81 MG EC tablet   No No   Sig: Take 1 tablet (81 mg) by mouth daily   blood glucose monitoring (ACCU-CHEK EDINSON PLUS) test strip   No No   Sig: Use to test blood sugar 4 times daily   blood glucose monitoring (ACCU-CHEK FASTCLIX) lancets   No No   Sig: Use to test blood sugar 4 times daily or as directed.  1 box = 102 lancets   carvedilol (COREG) 12.5 MG tablet   No No   Sig: TAKE 1 TABLET(12.5 MG) BY MOUTH TWICE DAILY WITH MEALS   cyanocobalamin (RA VITAMIN B-12 TR) 1000 MCG TBCR   Yes No   Sig: Take 5,000 mcg by mouth every morning    dapagliflozin (FARXIGA) 10 MG TABS tablet   No No   Sig: Take 1 tablet (10 mg) by mouth daily   diclofenac (VOLTAREN) 1 % topical gel   No No   Sig: Place 2 g onto the skin 4 times daily    econazole nitrate 1 % external cream   No No   Sig: Apply topically daily To feet and toenails.   ferrous sulfate (FEROSUL) 325 (65 Fe) MG tablet   No No   Sig: Take 1 tablet (325 mg) by mouth 3 times daily (with meals)   insulin degludec (TRESIBA) 200 UNIT/ML pen   No No   Sig: Take 70 units daily.   lactulose (CHRONULAC) 10 GM/15ML solution   No No   Sig: Take 45 mLs (30 g) by mouth 4 times daily   metFORMIN (GLUCOPHAGE-XR) 500 MG 24 hr tablet   No No   Sig: Take 1 tablet (500 mg) by mouth daily (with breakfast)   omeprazole (PRILOSEC) 40 MG DR capsule   No No   Sig: Take 1 capsule (40 mg) by mouth daily   potassium chloride ER (K-DUR/KLOR-CON M) 20 MEQ CR tablet   No No   Sig: Take 1 tablet (20 mEq) by mouth daily   rosuvastatin (CRESTOR) 20 MG tablet   No No   Sig: Take 1 tablet (20 mg) by mouth daily   tamsulosin (FLOMAX) 0.4 MG capsule   No No   Sig: TAKE 1 CAPSULE(0.4 MG) BY MOUTH DAILY   traZODone (DESYREL) 50 MG tablet   No No   Sig: Take 1 tablet (50 mg) by mouth At Bedtime      Facility-Administered Medications Last Administration Doses Remaining   Aflibercept (EYLEA) injection 2 mg 6/27/2019  1:03 PM 9   Aflibercept (EYLEA) injection 2 mg 6/27/2019  1:03 PM 9   ranibizumab (LUCENTIS) injection syringe 0.5 mg None recorded 12           ALLERGIES:     Allergies   Allergen Reactions     Codeine Other (See Comments)     Cannot take due to liver  Cannot tolerate oral narcotics     Seasonal Allergies      Sneezing, coughing, runny and itchy eyes       FAMILY HISTORY:  Family History   Problem Relation Age of Onset     Prostate Cancer Maternal Grandfather      Substance Abuse Maternal Grandfather         Alcohol     Colon Cancer Father 60     Pancreatic Cancer Father 60     Prostate Cancer Father      Colorectal Cancer Father      Macular Degeneration Father      Cancer Father      Glaucoma Father      Skin Cancer Father      Colorectal Cancer Maternal Grandmother      Cancer Maternal Grandmother       Substance Abuse Maternal Grandmother         Alcohol     Colorectal Cancer Paternal Grandmother      Cancer Mother      Diabetes Mother          3/2016     Cerebrovascular Disease Mother         Passed away in Feb of this year, 80 years old.     Thyroid Disease Mother      Depression Mother      Asthma Sister         Had since birth     Thyroid Disease Sister      Depression Sister      Liver Disease No family hx of      Melanoma No family hx of        SOCIAL HISTORY:  Social History     Socioeconomic History     Marital status:      Spouse name: Not on file     Number of children: Not on file     Years of education: Not on file     Highest education level: Not on file   Occupational History     Not on file   Social Needs     Financial resource strain: Not on file     Food insecurity:     Worry: Not on file     Inability: Not on file     Transportation needs:     Medical: Not on file     Non-medical: Not on file   Tobacco Use     Smoking status: Never Smoker     Smokeless tobacco: Former User     Types: Chew     Tobacco comment: 1 tin per week   Substance and Sexual Activity     Alcohol use: No     Alcohol/week: 0.0 standard drinks     Comment: quit 1996     Drug use: No     Sexual activity: Not Currently     Partners: Female     Birth control/protection: Condom   Lifestyle     Physical activity:     Days per week: Not on file     Minutes per session: Not on file     Stress: Not on file   Relationships     Social connections:     Talks on phone: Not on file     Gets together: Not on file     Attends Judaism service: Not on file     Active member of club or organization: Not on file     Attends meetings of clubs or organizations: Not on file     Relationship status: Not on file     Intimate partner violence:     Fear of current or ex partner: Not on file     Emotionally abused: Not on file     Physically abused: Not on file     Forced sexual activity: Not on file   Other Topics Concern      Parent/sibling w/ CABG, MI or angioplasty before 65F 55M? Yes   Social History Narrative     Not on file       PHYSICAL EXAM:  Blood pressure 103/58, pulse 64, temperature 95.7  F (35.4  C), temperature source Oral, resp. rate 16, weight 79.4 kg (175 lb 1.6 oz), SpO2 97 %.  GENERAL: Appears alert and oriented times three.   HEENT: Eye symmetrical and free of discharge bilaterally. Mucous membranes moist and without lesions.  NECK: Supple and without lymphadenopathy.  CV: RRR, S1S2 present without murmur, rub, or gallop.   RESPIRATORY: Respirations regular, even, and unlabored. Lungs CTA throughout.   GI: Soft and non distended with normoactive bowel sounds present in all quadrants. No tenderness, rebound, guarding. No organomegaly.   EXTREMITIES: No peripheral edema. 2+ bilateral pedal pulses.   NEUROLOGIC: Alert and orientated x 3. CN II-XII grossly intact. No focal deficits.   MUSCULOSKELETAL: No joint swelling or tenderness.   SKIN: No jaundice. No rashes or lesions.     LABS:  CBC:  Recent Labs   Lab Test 10/23/19  0847   WBC 3.1*   RBC 3.52*   HGB 12.6*   HCT 37.7*   *   MCH 35.8*   MCHC 33.4   RDW 14.0   PLT 40*       CMP:  Recent Labs   Lab Test 10/23/19  0847      POTASSIUM 4.0   CHLORIDE 112*   DEBORAH 8.5   CO2 23   BUN 25   CR 1.09   GLC 96   AST 51*   ALT 54   BILITOTAL 1.0   ALBUMIN 2.8*   PROTTOTAL 7.0   ALKPHOS 138       ASSESSMENT & PLAN:  Frandy Workman is a 54 year old male with PMHx significant for cirrhosis secondary to ESTRADA, HCC, HTN, HLD, GERD, BPH and DMII who presents to [facility] for a possible  donor liver transplant.     -NPO  -preop orders  -pending OR for transplant    Tresa Mckeon MD  PGY-1 Surgery  [phone]    I have reviewed history, examined patient and discussed plan with the fellow/resident/SERA.    I concur with the findings in this note.    Time spent on admission activities: 45 minutes.

## 2019-11-10 NOTE — SIGNIFICANT EVENT
SPIRITUAL HEALTH SERVICES Significant Event  Sikhism Sacrament of ANOINTING  [facility]    Pt requested anointing and Communion prior to his liver transplant.    PATIENT ANOINTED and given tiny speck of Communion (which was cleared by his nurse) by Father Gonzalo Mojica 11/10/2019.    Gonzalo Mojica M.Div.     Pager [phone]

## 2019-11-10 NOTE — LETTER
Transition Communication Hand-off for Care Transitions to Next Level of Care Provider    Name: Frandy Workman  : 1965  MRN #: [id number]  Primary Care Provider: Natalie Russell     Primary Clinic: [address] [place] [id number]     Reason for Hospitalization:  liver transplant, end stage liver disease  Liver transplant recipient (H)  HCC (hepatocellular carcinoma) (H)  Status post liver transplantation (H)  Admit Date/Time: 11/10/2019  1:30 PM  Discharge Date:   Payor Source: Payor: MEDICARE / Plan: MEDICARE / Product Type: Medicare /              Reason for Communication Hand-off Referral: Other LT    Discharge Plan: home with his friend ART--Art is here from [place] and will come to [facility]  then will go back to [place]  Pt has new PCP appointment on  in [facility]----he will Uber or ask friends for rides       Concern for non-adherence with plan of care:   Y/N Yes--he is single with primary caregiver that lives in [place]--he plans to private pay for extra services he needs  Discharge Needs Assessment:  Needs      Most Recent Value   Home Care  [facility] [phone], Fax: [phone]            Follow-up specialty is recommended: Yes    Follow-up plan:    Future Appointments   Date Time Provider Department Center   2019  9:00 AM UC SPEC INFUSION [facility]   2019  9:30 AM Yonathan Chino MD [facility]   2019 12:35 PM Maximo Tucker DO [facility]   2019  9:30 AM  LAB [facility]   2019 10:30 AM Eloy Rao MD [facility]   2019 11:30 AM Donald Bradley, [facility]   2019  3:00 PM Jennifer Wilcox MD [facility]   2020  1:30 PM Lamin Gonzalez DPM [facility]   2020  9:00 AM [address]   2020 10:00 AM  [facility]   2020  9:45 AM Miguel Villarreal MD [facility]   2020 11:00 AM Santi Rosas [facility]   4/1/2020 12:20 PM Nerissa Moe MD [facility]       Any outstanding tests or procedures:              Key Recommendations:      Desirae William RN    AVS/Discharge Summary is the source of truth; this is a helpful guide for improved communication of patient story

## 2019-11-10 NOTE — PROGRESS NOTES
"SPIRITUAL HEALTH SERVICES  SPIRITUAL ASSESSMENT Progress Note  [facility] ([place]) 7B     REFERRAL SOURCE: Hospital  request    Pt Miller identifies as Buddhism. He says that \"I have lost half of my raquel\" through his trials with his health and family. Miller shared that he was called in this morning after waiting for 5 years as a liver transplant recipient.     He lost his wife years ago and was to raise his daughter alone. He mentioned that \"my brother and sister took my daughter away from me\" who is now 18 years-old attending her last year of highschool. He has not seen his daughter in 2.5 years.     Miller also requested some sacraments from the  .      PLAN: I will contact the   for follow up and anointing. Spiritual Health is available to Miller per request.    Lamont Sheffield  Chaplain Resident  Pager [phone]    "

## 2019-11-10 NOTE — TELEPHONE ENCOUNTER
TRANSPLANT OR REPORT    Organ: Liver   Laterality (if known): N/A  Organ Location: pre-precurement    UNOS ID:[id number]  Donor OR Time: 2000  Expected/Actual Cross Clamp Time: TBD  Expected Organ Arrival Time: 0600    Surgeon: Rosa Rodriguez  Time in OR: 0600  Time in 3C (N/A for LI): N/A    Recipient Details  Admission ETA: On 7A  Unit: 7A  Isolation: none  Latex Allergy: none  : none  Diagnosis: ESLD    Liver Transplants  Bypass: yes  Hemodialysis: no  ~ Medicine Renal Staff: n/a  ~ CRRT Resource Nurse:n/a  (Telephone Number for CRRT [phone])    Kidney/Panc Transplants  XM Status (Need to wait for XM?):n/A    Liver or KP/PA Recipients:  Can Vessels be Banked: NO    Spoke with Perri at OR control desk     Transplant Coordinator Contact Info: Jie Maria until 1900 11/10/2019 [phone]      Vessel Bank Information  Transplant hospitals must not store a donor s extra vessels if the donor has tested positive for any of the following:   - HIV by antibody, antigen, or nucleic acid test (LEONA)   - Hepatitis B surface antigen (HBsAg)   - Hepatitis B (HBV) by LEONA   - Hepatitis C (HCV) by antibody or LEONA     Extra vessels from donors that do not test positive for HIV, HBV, or HCV as above may be stored      Jie Maria RN on 11/10/2019 at 3:32 PM

## 2019-11-10 NOTE — PROGRESS NOTES
Patient admitted to unit for pre-op liver transplant. Pre-op order set released and initiated. Shower x1 completed. PIV SL'd. Up independently. AVSS on RA. C/o chronic abdominal pain which requires no intervention. Denies nausea. NPO since 1100 this AM. Will facilitate remainder of pre-op orders.

## 2019-11-11 ENCOUNTER — DOCUMENTATION ONLY (OUTPATIENT)
Dept: TRANSPLANT | Facility: CLINIC | Age: 54
End: 2019-11-11

## 2019-11-11 ENCOUNTER — ANESTHESIA (OUTPATIENT)
Dept: SURGERY | Facility: CLINIC | Age: 54
DRG: 005 | End: 2019-11-11
Payer: MEDICARE

## 2019-11-11 ENCOUNTER — APPOINTMENT (OUTPATIENT)
Dept: GENERAL RADIOLOGY | Facility: CLINIC | Age: 54
DRG: 005 | End: 2019-11-11
Attending: SURGERY
Payer: MEDICARE

## 2019-11-11 PROBLEM — Z94.4 STATUS POST LIVER TRANSPLANTATION (H): Status: ACTIVE | Noted: 2019-11-11

## 2019-11-11 LAB
ALBUMIN SERPL-MCNC: 2.3 G/DL (ref 3.4–5)
ALP SERPL-CCNC: 90 U/L (ref 40–150)
ALT SERPL W P-5'-P-CCNC: 127 U/L (ref 0–70)
ANGLE RATE OF CLOT GROWTH: 41.1 DEG (ref 59–74)
ANGLE RATE OF CLOT GROWTH: 50.2 DEG (ref 59–74)
ANGLE RATE OF CLOT GROWTH: 52.1 DEG (ref 59–74)
ANGLE RATE OF CLOT GROWTH: 54 DEG (ref 59–74)
ANION GAP SERPL CALCULATED.3IONS-SCNC: 4 MMOL/L (ref 3–14)
ANION GAP SERPL CALCULATED.3IONS-SCNC: 4 MMOL/L (ref 3–14)
ANISOCYTOSIS BLD QL SMEAR: SLIGHT
APTT PPP: 35 SEC (ref 22–37)
APTT PPP: 40 SEC (ref 22–37)
APTT PPP: 42 SEC (ref 22–37)
APTT PPP: 44 SEC (ref 22–37)
APTT PPP: 48 SEC (ref 22–37)
APTT PPP: 48 SEC (ref 22–37)
APTT PPP: 54 SEC (ref 22–37)
APTT PPP: 63 SEC (ref 22–37)
APTT PPP: 66 SEC (ref 22–37)
APTT PPP: 92 SEC (ref 22–37)
AST SERPL W P-5'-P-CCNC: 382 U/L (ref 0–45)
BASE DEFICIT BLDA-SCNC: 0.1 MMOL/L
BASE DEFICIT BLDA-SCNC: 0.4 MMOL/L
BASE DEFICIT BLDA-SCNC: 0.6 MMOL/L
BASE DEFICIT BLDA-SCNC: 1.7 MMOL/L
BASE DEFICIT BLDA-SCNC: 2.3 MMOL/L
BASE DEFICIT BLDA-SCNC: 2.5 MMOL/L
BASE DEFICIT BLDA-SCNC: 2.8 MMOL/L
BASE DEFICIT BLDA-SCNC: 3.9 MMOL/L
BASE EXCESS BLDA CALC-SCNC: 1.9 MMOL/L
BASE EXCESS BLDA CALC-SCNC: 2 MMOL/L
BASE EXCESS BLDA CALC-SCNC: 2.5 MMOL/L
BASE EXCESS BLDV CALC-SCNC: 1.5 MMOL/L
BASOPHILS # BLD AUTO: 0 10E9/L (ref 0–0.2)
BASOPHILS NFR BLD AUTO: 0 %
BASOPHILS NFR BLD AUTO: 0.1 %
BASOPHILS NFR BLD AUTO: 0.6 %
BILIRUB DIRECT SERPL-MCNC: 1.1 MG/DL (ref 0–0.2)
BILIRUB SERPL-MCNC: 3.5 MG/DL (ref 0.2–1.3)
BLD PROD TYP BPU: NORMAL
BLD UNIT ID BPU: 0
BLOOD PRODUCT CODE: NORMAL
BPU ID: NORMAL
BUN SERPL-MCNC: 21 MG/DL (ref 7–30)
BUN SERPL-MCNC: 23 MG/DL (ref 7–30)
CA-I BLD-MCNC: 4.6 MG/DL (ref 4.4–5.2)
CA-I BLD-MCNC: 4.6 MG/DL (ref 4.4–5.2)
CA-I BLD-MCNC: 5.1 MG/DL (ref 4.4–5.2)
CA-I BLD-MCNC: 5.2 MG/DL (ref 4.4–5.2)
CA-I BLD-MCNC: 5.2 MG/DL (ref 4.4–5.2)
CA-I BLD-MCNC: 5.3 MG/DL (ref 4.4–5.2)
CA-I BLD-MCNC: 5.8 MG/DL (ref 4.4–5.2)
CA-I BLD-MCNC: 5.9 MG/DL (ref 4.4–5.2)
CA-I BLD-MCNC: 6 MG/DL (ref 4.4–5.2)
CA-I BLD-MCNC: 6.2 MG/DL (ref 4.4–5.2)
CALCIUM SERPL-MCNC: 9.1 MG/DL (ref 8.5–10.1)
CALCIUM SERPL-MCNC: 9.7 MG/DL (ref 8.5–10.1)
CHLORIDE SERPL-SCNC: 113 MMOL/L (ref 94–109)
CHLORIDE SERPL-SCNC: 114 MMOL/L (ref 94–109)
CI HYPOCOAGULATION INDEX: 4.3 RATIO (ref 0–3)
CI HYPOCOAGULATION INDEX: 5 RATIO (ref 0–3)
CI HYPOCOAGULATION INDEX: 6.3 RATIO (ref 0–3)
CI HYPOCOAGULATION INDEX: 8.8 RATIO (ref 0–3)
CLOT LYSIS 30M P MA LENFR BLD TEG: 0 % (ref 0–8)
CLOT STRENGTH BLD TEG: 3.4 KD/SC (ref 5.3–13.2)
CLOT STRENGTH BLD TEG: 4.1 KD/SC (ref 5.3–13.2)
CLOT STRENGTH BLD TEG: 4.3 KD/SC (ref 5.3–13.2)
CLOT STRENGTH BLD TEG: 5.1 KD/SC (ref 5.3–13.2)
CMV IGG SERPL QL IA: 0.4 AI (ref 0–0.8)
CMV IGM SERPL QL IA: <0.2 AI (ref 0–0.8)
CO2 SERPL-SCNC: 26 MMOL/L (ref 20–32)
CO2 SERPL-SCNC: 27 MMOL/L (ref 20–32)
CREAT SERPL-MCNC: 1.15 MG/DL (ref 0.66–1.25)
CREAT SERPL-MCNC: 1.18 MG/DL (ref 0.66–1.25)
DIFFERENTIAL METHOD BLD: ABNORMAL
EBV VCA IGG SER QL IA: >8 AI (ref 0–0.8)
EBV VCA IGM SER QL IA: <0.2 AI (ref 0–0.8)
EOSINOPHIL # BLD AUTO: 0 10E9/L (ref 0–0.7)
EOSINOPHIL # BLD AUTO: 0 10E9/L (ref 0–0.7)
EOSINOPHIL # BLD AUTO: 0.1 10E9/L (ref 0–0.7)
EOSINOPHIL NFR BLD AUTO: 0 %
EOSINOPHIL NFR BLD AUTO: 0.1 %
EOSINOPHIL NFR BLD AUTO: 3.3 %
ERYTHROCYTE [DISTWIDTH] IN BLOOD BY AUTOMATED COUNT: 17.5 % (ref 10–15)
ERYTHROCYTE [DISTWIDTH] IN BLOOD BY AUTOMATED COUNT: 17.6 % (ref 10–15)
FIBRINOGEN PPP-MCNC: 131 MG/DL (ref 200–420)
FIBRINOGEN PPP-MCNC: 156 MG/DL (ref 200–420)
FIBRINOGEN PPP-MCNC: 161 MG/DL (ref 200–420)
FIBRINOGEN PPP-MCNC: 163 MG/DL (ref 200–420)
FIBRINOGEN PPP-MCNC: 163 MG/DL (ref 200–420)
FIBRINOGEN PPP-MCNC: 174 MG/DL (ref 200–420)
FIBRINOGEN PPP-MCNC: 192 MG/DL (ref 200–420)
FIBRINOGEN PPP-MCNC: 199 MG/DL (ref 200–420)
FIBRINOGEN PPP-MCNC: 199 MG/DL (ref 200–420)
FIBRINOGEN PPP-MCNC: 203 MG/DL (ref 200–420)
FIBRINOGEN PPP-MCNC: 207 MG/DL (ref 200–420)
GFR SERPL CREATININE-BSD FRML MDRD: 69 ML/MIN/{1.73_M2}
GFR SERPL CREATININE-BSD FRML MDRD: 71 ML/MIN/{1.73_M2}
GLUCOSE BLD-MCNC: 128 MG/DL (ref 70–99)
GLUCOSE BLD-MCNC: 133 MG/DL (ref 70–99)
GLUCOSE BLD-MCNC: 147 MG/DL (ref 70–99)
GLUCOSE BLD-MCNC: 159 MG/DL (ref 70–99)
GLUCOSE BLD-MCNC: 168 MG/DL (ref 70–99)
GLUCOSE BLD-MCNC: 170 MG/DL (ref 70–99)
GLUCOSE BLD-MCNC: 174 MG/DL (ref 70–99)
GLUCOSE BLD-MCNC: 174 MG/DL (ref 70–99)
GLUCOSE BLD-MCNC: 183 MG/DL (ref 70–99)
GLUCOSE BLD-MCNC: 197 MG/DL (ref 70–99)
GLUCOSE BLDC GLUCOMTR-MCNC: 131 MG/DL (ref 70–99)
GLUCOSE BLDC GLUCOMTR-MCNC: 140 MG/DL (ref 70–99)
GLUCOSE BLDC GLUCOMTR-MCNC: 156 MG/DL (ref 70–99)
GLUCOSE BLDC GLUCOMTR-MCNC: 163 MG/DL (ref 70–99)
GLUCOSE BLDC GLUCOMTR-MCNC: 167 MG/DL (ref 70–99)
GLUCOSE BLDC GLUCOMTR-MCNC: 173 MG/DL (ref 70–99)
GLUCOSE BLDC GLUCOMTR-MCNC: 190 MG/DL (ref 70–99)
GLUCOSE BLDC GLUCOMTR-MCNC: 197 MG/DL (ref 70–99)
GLUCOSE SERPL-MCNC: 142 MG/DL (ref 70–99)
GLUCOSE SERPL-MCNC: 212 MG/DL (ref 70–99)
HBV CORE AB SERPL QL IA: NONREACTIVE
HBV SURFACE AB SERPL IA-ACNC: 431.07 M[IU]/ML
HCO3 BLD-SCNC: 21 MMOL/L (ref 21–28)
HCO3 BLD-SCNC: 22 MMOL/L (ref 21–28)
HCO3 BLD-SCNC: 24 MMOL/L (ref 21–28)
HCO3 BLD-SCNC: 26 MMOL/L (ref 21–28)
HCO3 BLD-SCNC: 26 MMOL/L (ref 21–28)
HCO3 BLD-SCNC: 27 MMOL/L (ref 21–28)
HCO3 BLDV-SCNC: 27 MMOL/L (ref 21–28)
HCT VFR BLD AUTO: 24.1 % (ref 40–53)
HCT VFR BLD AUTO: 25.1 % (ref 40–53)
HCV AB SERPL QL IA: NONREACTIVE
HGB BLD-MCNC: 10.2 G/DL (ref 13.3–17.7)
HGB BLD-MCNC: 11.8 G/DL (ref 13.3–17.7)
HGB BLD-MCNC: 12.5 G/DL (ref 13.3–17.7)
HGB BLD-MCNC: 8.6 G/DL (ref 13.3–17.7)
HGB BLD-MCNC: 8.8 G/DL (ref 13.3–17.7)
HGB BLD-MCNC: 8.8 G/DL (ref 13.3–17.7)
HGB BLD-MCNC: 8.9 G/DL (ref 13.3–17.7)
HGB BLD-MCNC: 9.2 G/DL (ref 13.3–17.7)
HGB BLD-MCNC: 9.4 G/DL (ref 13.3–17.7)
HGB BLD-MCNC: 9.5 G/DL (ref 13.3–17.7)
HGB BLD-MCNC: 9.6 G/DL (ref 13.3–17.7)
HGB BLD-MCNC: 9.9 G/DL (ref 13.3–17.7)
HIV 1+2 AB+HIV1 P24 AG SERPL QL IA: NONREACTIVE
IMM GRANULOCYTES # BLD: 0 10E9/L (ref 0–0.4)
IMM GRANULOCYTES # BLD: 0.1 10E9/L (ref 0–0.4)
IMM GRANULOCYTES NFR BLD: 0.3 %
IMM GRANULOCYTES NFR BLD: 0.6 %
INR PPP: 1.46 (ref 0.86–1.14)
INR PPP: 1.49 (ref 0.86–1.14)
INR PPP: 1.55 (ref 0.86–1.14)
INR PPP: 1.57 (ref 0.86–1.14)
INR PPP: 1.6 (ref 0.86–1.14)
INR PPP: 1.62 (ref 0.86–1.14)
INR PPP: 1.66 (ref 0.86–1.14)
INR PPP: 1.71 (ref 0.86–1.14)
INR PPP: 1.81 (ref 0.86–1.14)
INR PPP: 2.06 (ref 0.86–1.14)
INTERPRETATION ECG - MUSE: NORMAL
K TIME TO SPEC CLOT STRENGTH: 3 MIN (ref 1–3)
K TIME TO SPEC CLOT STRENGTH: 3.2 MIN (ref 1–3)
K TIME TO SPEC CLOT STRENGTH: 3.2 MIN (ref 1–3)
K TIME TO SPEC CLOT STRENGTH: 4.7 MIN (ref 1–3)
LACTATE BLD-SCNC: 1.3 MMOL/L (ref 0.7–2)
LACTATE BLD-SCNC: 1.5 MMOL/L (ref 0.7–2)
LACTATE BLD-SCNC: 1.5 MMOL/L (ref 0.7–2)
LACTATE BLD-SCNC: 1.9 MMOL/L (ref 0.7–2)
LACTATE BLD-SCNC: 2.2 MMOL/L (ref 0.7–2)
LACTATE BLD-SCNC: 2.2 MMOL/L (ref 0.7–2)
LACTATE BLD-SCNC: 2.8 MMOL/L (ref 0.7–2)
LACTATE BLD-SCNC: 2.8 MMOL/L (ref 0.7–2)
LACTATE BLD-SCNC: 3 MMOL/L (ref 0.7–2)
LACTATE BLD-SCNC: 3.3 MMOL/L (ref 0.7–2)
LACTATE BLD-SCNC: 3.6 MMOL/L (ref 0.7–2)
LY60 LYSIS AT 60 MINUTES: 0 % (ref 0–15)
LY60 LYSIS AT 60 MINUTES: 0.2 % (ref 0–15)
LYMPHOCYTES # BLD AUTO: 0.2 10E9/L (ref 0.8–5.3)
LYMPHOCYTES # BLD AUTO: 0.7 10E9/L (ref 0.8–5.3)
LYMPHOCYTES # BLD AUTO: 0.9 10E9/L (ref 0.8–5.3)
LYMPHOCYTES NFR BLD AUTO: 1.7 %
LYMPHOCYTES NFR BLD AUTO: 18 %
LYMPHOCYTES NFR BLD AUTO: 8.7 %
MA MAXIMUM CLOT STRENGTH: 40.6 MM (ref 55–74)
MA MAXIMUM CLOT STRENGTH: 45 MM (ref 55–74)
MA MAXIMUM CLOT STRENGTH: 46.4 MM (ref 55–74)
MA MAXIMUM CLOT STRENGTH: 50.3 MM (ref 55–74)
MAGNESIUM SERPL-MCNC: 1.8 MG/DL (ref 1.6–2.3)
MCH RBC QN AUTO: 32.1 PG (ref 26.5–33)
MCH RBC QN AUTO: 32.3 PG (ref 26.5–33)
MCHC RBC AUTO-ENTMCNC: 35.1 G/DL (ref 31.5–36.5)
MCHC RBC AUTO-ENTMCNC: 35.7 G/DL (ref 31.5–36.5)
MCV RBC AUTO: 91 FL (ref 78–100)
MCV RBC AUTO: 92 FL (ref 78–100)
MICROCYTES BLD QL SMEAR: PRESENT
MONOCYTES # BLD AUTO: 0.1 10E9/L (ref 0–1.3)
MONOCYTES # BLD AUTO: 0.3 10E9/L (ref 0–1.3)
MONOCYTES # BLD AUTO: 0.3 10E9/L (ref 0–1.3)
MONOCYTES NFR BLD AUTO: 0.8 %
MONOCYTES NFR BLD AUTO: 3.1 %
MONOCYTES NFR BLD AUTO: 9.4 %
MRSA DNA SPEC QL NAA+PROBE: NEGATIVE
NEUTROPHILS # BLD AUTO: 2.5 10E9/L (ref 1.6–8.3)
NEUTROPHILS # BLD AUTO: 9.4 10E9/L (ref 1.6–8.3)
NEUTROPHILS # BLD AUTO: 9.4 10E9/L (ref 1.6–8.3)
NEUTROPHILS NFR BLD AUTO: 68.4 %
NEUTROPHILS NFR BLD AUTO: 87.4 %
NEUTROPHILS NFR BLD AUTO: 97.5 %
NRBC # BLD AUTO: 0 10*3/UL
NRBC # BLD AUTO: 0 10*3/UL
NRBC BLD AUTO-RTO: 0 /100
NRBC BLD AUTO-RTO: 0 /100
NUM BPU REQUESTED: 15
O2/TOTAL GAS SETTING VFR VENT: 30 %
O2/TOTAL GAS SETTING VFR VENT: 32 %
O2/TOTAL GAS SETTING VFR VENT: 34 %
O2/TOTAL GAS SETTING VFR VENT: 35 %
O2/TOTAL GAS SETTING VFR VENT: 44 %
O2/TOTAL GAS SETTING VFR VENT: 44 %
O2/TOTAL GAS SETTING VFR VENT: 45 %
O2/TOTAL GAS SETTING VFR VENT: 46 %
O2/TOTAL GAS SETTING VFR VENT: 46 %
O2/TOTAL GAS SETTING VFR VENT: 50 %
OVALOCYTES BLD QL SMEAR: SLIGHT
OXYHGB MFR BLDV: 63 %
PCO2 BLD: 34 MM HG (ref 35–45)
PCO2 BLD: 35 MM HG (ref 35–45)
PCO2 BLD: 36 MM HG (ref 35–45)
PCO2 BLD: 37 MM HG (ref 35–45)
PCO2 BLD: 37 MM HG (ref 35–45)
PCO2 BLD: 39 MM HG (ref 35–45)
PCO2 BLD: 39 MM HG (ref 35–45)
PCO2 BLD: 42 MM HG (ref 35–45)
PCO2 BLDV: 44 MM HG (ref 40–50)
PH BLD: 7.37 PH (ref 7.35–7.45)
PH BLD: 7.37 PH (ref 7.35–7.45)
PH BLD: 7.39 PH (ref 7.35–7.45)
PH BLD: 7.4 PH (ref 7.35–7.45)
PH BLD: 7.4 PH (ref 7.35–7.45)
PH BLD: 7.41 PH (ref 7.35–7.45)
PH BLD: 7.42 PH (ref 7.35–7.45)
PH BLD: 7.42 PH (ref 7.35–7.45)
PH BLD: 7.43 PH (ref 7.35–7.45)
PH BLD: 7.47 PH (ref 7.35–7.45)
PH BLD: 7.48 PH (ref 7.35–7.45)
PH BLDV: 7.39 PH (ref 7.32–7.43)
PHOSPHATE SERPL-MCNC: 2.6 MG/DL (ref 2.5–4.5)
PLATELET # BLD AUTO: 31 10E9/L (ref 150–450)
PLATELET # BLD AUTO: 40 10E9/L (ref 150–450)
PLATELET # BLD AUTO: 40 10E9/L (ref 150–450)
PLATELET # BLD AUTO: 41 10E9/L (ref 150–450)
PLATELET # BLD AUTO: 53 10E9/L (ref 150–450)
PLATELET # BLD AUTO: 56 10E9/L (ref 150–450)
PLATELET # BLD AUTO: 57 10E9/L (ref 150–450)
PLATELET # BLD AUTO: 59 10E9/L (ref 150–450)
PLATELET # BLD AUTO: 68 10E9/L (ref 150–450)
PLATELET # BLD AUTO: 71 10E9/L (ref 150–450)
PLATELET # BLD EST: ABNORMAL 10*3/UL
PO2 BLD: 103 MM HG (ref 80–105)
PO2 BLD: 108 MM HG (ref 80–105)
PO2 BLD: 127 MM HG (ref 80–105)
PO2 BLD: 131 MM HG (ref 80–105)
PO2 BLD: 157 MM HG (ref 80–105)
PO2 BLD: 163 MM HG (ref 80–105)
PO2 BLD: 171 MM HG (ref 80–105)
PO2 BLD: 178 MM HG (ref 80–105)
PO2 BLD: 187 MM HG (ref 80–105)
PO2 BLD: 81 MM HG (ref 80–105)
PO2 BLD: 98 MM HG (ref 80–105)
PO2 BLDV: 34 MM HG (ref 25–47)
POIKILOCYTOSIS BLD QL SMEAR: SLIGHT
POTASSIUM BLD-SCNC: 4 MMOL/L (ref 3.4–5.3)
POTASSIUM BLD-SCNC: 4 MMOL/L (ref 3.4–5.3)
POTASSIUM BLD-SCNC: 4.1 MMOL/L (ref 3.4–5.3)
POTASSIUM BLD-SCNC: 4.3 MMOL/L (ref 3.4–5.3)
POTASSIUM BLD-SCNC: 4.3 MMOL/L (ref 3.4–5.3)
POTASSIUM BLD-SCNC: 4.4 MMOL/L (ref 3.4–5.3)
POTASSIUM BLD-SCNC: 4.5 MMOL/L (ref 3.4–5.3)
POTASSIUM BLD-SCNC: 4.5 MMOL/L (ref 3.4–5.3)
POTASSIUM BLD-SCNC: 4.7 MMOL/L (ref 3.4–5.3)
POTASSIUM BLD-SCNC: 4.7 MMOL/L (ref 3.4–5.3)
POTASSIUM SERPL-SCNC: 4.1 MMOL/L (ref 3.4–5.3)
POTASSIUM SERPL-SCNC: 4.2 MMOL/L (ref 3.4–5.3)
PROT SERPL-MCNC: 4.5 G/DL (ref 6.8–8.8)
R TIME UNTIL CLOT FORMS: 10.2 MIN (ref 4–9)
R TIME UNTIL CLOT FORMS: 11.3 MIN (ref 4–9)
R TIME UNTIL CLOT FORMS: 8.3 MIN (ref 4–9)
R TIME UNTIL CLOT FORMS: 8.9 MIN (ref 4–9)
RBC # BLD AUTO: 2.66 10E12/L (ref 4.4–5.9)
RBC # BLD AUTO: 2.74 10E12/L (ref 4.4–5.9)
SODIUM BLD-SCNC: 142 MMOL/L (ref 133–144)
SODIUM BLD-SCNC: 143 MMOL/L (ref 133–144)
SODIUM BLD-SCNC: 144 MMOL/L (ref 133–144)
SODIUM BLD-SCNC: 144 MMOL/L (ref 133–144)
SODIUM SERPL-SCNC: 143 MMOL/L (ref 133–144)
SODIUM SERPL-SCNC: 145 MMOL/L (ref 133–144)
SPECIMEN SOURCE: NORMAL
TRANSFUSION STATUS PATIENT QL: NORMAL
WBC # BLD AUTO: 10.7 10E9/L (ref 4–11)
WBC # BLD AUTO: 9.6 10E9/L (ref 4–11)

## 2019-11-11 PROCEDURE — 84295 ASSAY OF SERUM SODIUM: CPT | Performed by: SURGERY

## 2019-11-11 PROCEDURE — 87641 MR-STAPH DNA AMP PROBE: CPT | Performed by: SURGERY

## 2019-11-11 PROCEDURE — 25000132 ZZH RX MED GY IP 250 OP 250 PS 637: Mod: GY | Performed by: SURGERY

## 2019-11-11 PROCEDURE — 88305 TISSUE EXAM BY PATHOLOGIST: CPT | Performed by: SURGERY

## 2019-11-11 PROCEDURE — 82803 BLOOD GASES ANY COMBINATION: CPT | Performed by: SURGERY

## 2019-11-11 PROCEDURE — 27210448 ZZH PACK RI-RAPID INFUSION: Performed by: SURGERY

## 2019-11-11 PROCEDURE — 85730 THROMBOPLASTIN TIME PARTIAL: CPT | Performed by: SURGERY

## 2019-11-11 PROCEDURE — 40000986 XR CHEST PORT 1 VW

## 2019-11-11 PROCEDURE — 36000068 ZZH SURGERY LEVEL 5 1ST 30 MIN - UMMC: Performed by: SURGERY

## 2019-11-11 PROCEDURE — 36000070 ZZH SURGERY LEVEL 5 EA 15 ADDTL MIN - UMMC: Performed by: SURGERY

## 2019-11-11 PROCEDURE — 40000014 ZZH STATISTIC ARTERIAL MONITORING DAILY

## 2019-11-11 PROCEDURE — 41000019 ZZH PERA-PERFUSION EACH ADDTL 15 MIN: Performed by: SURGERY

## 2019-11-11 PROCEDURE — 80048 BASIC METABOLIC PNL TOTAL CA: CPT | Performed by: SURGERY

## 2019-11-11 PROCEDURE — 85004 AUTOMATED DIFF WBC COUNT: CPT | Performed by: SURGERY

## 2019-11-11 PROCEDURE — 25000128 H RX IP 250 OP 636: Performed by: STUDENT IN AN ORGANIZED HEALTH CARE EDUCATION/TRAINING PROGRAM

## 2019-11-11 PROCEDURE — 25000125 ZZHC RX 250: Performed by: NURSE ANESTHETIST, CERTIFIED REGISTERED

## 2019-11-11 PROCEDURE — 85610 PROTHROMBIN TIME: CPT | Performed by: SURGERY

## 2019-11-11 PROCEDURE — 85384 FIBRINOGEN ACTIVITY: CPT | Performed by: SURGERY

## 2019-11-11 PROCEDURE — 82947 ASSAY GLUCOSE BLOOD QUANT: CPT | Performed by: SURGERY

## 2019-11-11 PROCEDURE — 88309 TISSUE EXAM BY PATHOLOGIST: CPT | Performed by: SURGERY

## 2019-11-11 PROCEDURE — 25000125 ZZHC RX 250: Performed by: SURGERY

## 2019-11-11 PROCEDURE — P9041 ALBUMIN (HUMAN),5%, 50ML: HCPCS | Performed by: STUDENT IN AN ORGANIZED HEALTH CARE EDUCATION/TRAINING PROGRAM

## 2019-11-11 PROCEDURE — 25000128 H RX IP 250 OP 636: Performed by: NURSE ANESTHETIST, CERTIFIED REGISTERED

## 2019-11-11 PROCEDURE — 37000008 ZZH ANESTHESIA TECHNICAL FEE, 1ST 30 MIN: Performed by: SURGERY

## 2019-11-11 PROCEDURE — 25000132 ZZH RX MED GY IP 250 OP 250 PS 637: Mod: GY | Performed by: STUDENT IN AN ORGANIZED HEALTH CARE EDUCATION/TRAINING PROGRAM

## 2019-11-11 PROCEDURE — 25800030 ZZH RX IP 258 OP 636: Performed by: SURGERY

## 2019-11-11 PROCEDURE — P9037 PLATE PHERES LEUKOREDU IRRAD: HCPCS | Performed by: SURGERY

## 2019-11-11 PROCEDURE — 99291 CRITICAL CARE FIRST HOUR: CPT | Mod: GC | Performed by: SURGERY

## 2019-11-11 PROCEDURE — 80076 HEPATIC FUNCTION PANEL: CPT | Performed by: SURGERY

## 2019-11-11 PROCEDURE — 25800030 ZZH RX IP 258 OP 636: Performed by: NURSE ANESTHETIST, CERTIFIED REGISTERED

## 2019-11-11 PROCEDURE — 85025 COMPLETE CBC W/AUTO DIFF WBC: CPT

## 2019-11-11 PROCEDURE — 0FY00Z0 TRANSPLANTATION OF LIVER, ALLOGENEIC, OPEN APPROACH: ICD-10-PCS | Performed by: SURGERY

## 2019-11-11 PROCEDURE — 25000128 H RX IP 250 OP 636: Performed by: SURGERY

## 2019-11-11 PROCEDURE — 27211024 ZZHC OR SUPPLY OTHER OPNP: Performed by: SURGERY

## 2019-11-11 PROCEDURE — 25800030 ZZH RX IP 258 OP 636

## 2019-11-11 PROCEDURE — P9016 RBC LEUKOCYTES REDUCED: HCPCS | Performed by: STUDENT IN AN ORGANIZED HEALTH CARE EDUCATION/TRAINING PROGRAM

## 2019-11-11 PROCEDURE — 0FB00ZX EXCISION OF LIVER, OPEN APPROACH, DIAGNOSTIC: ICD-10-PCS | Performed by: SURGERY

## 2019-11-11 PROCEDURE — 40000986 XR ABDOMEN PORT 1 VW

## 2019-11-11 PROCEDURE — 25800030 ZZH RX IP 258 OP 636: Performed by: STUDENT IN AN ORGANIZED HEALTH CARE EDUCATION/TRAINING PROGRAM

## 2019-11-11 PROCEDURE — P9041 ALBUMIN (HUMAN),5%, 50ML: HCPCS | Performed by: NURSE ANESTHETIST, CERTIFIED REGISTERED

## 2019-11-11 PROCEDURE — 06B40ZZ EXCISION OF HEPATIC VEIN, OPEN APPROACH: ICD-10-PCS | Performed by: SURGERY

## 2019-11-11 PROCEDURE — 00000146 ZZHCL STATISTIC GLUCOSE BY METER IP

## 2019-11-11 PROCEDURE — 82330 ASSAY OF CALCIUM: CPT | Performed by: SURGERY

## 2019-11-11 PROCEDURE — 37000009 ZZH ANESTHESIA TECHNICAL FEE, EACH ADDTL 15 MIN: Performed by: SURGERY

## 2019-11-11 PROCEDURE — P9012 CRYOPRECIPITATE EACH UNIT: HCPCS | Performed by: SURGERY

## 2019-11-11 PROCEDURE — 84132 ASSAY OF SERUM POTASSIUM: CPT | Performed by: SURGERY

## 2019-11-11 PROCEDURE — 40000344 ZZHCL STATISTIC THAWING COMPONENT: Performed by: SURGERY

## 2019-11-11 PROCEDURE — P9059 PLASMA, FRZ BETWEEN 8-24HOUR: HCPCS | Performed by: SURGERY

## 2019-11-11 PROCEDURE — 94640 AIRWAY INHALATION TREATMENT: CPT

## 2019-11-11 PROCEDURE — 41000018 ZZH PER-PERFUSION 1ST 30 MIN: Performed by: SURGERY

## 2019-11-11 PROCEDURE — 88304 TISSUE EXAM BY PATHOLOGIST: CPT | Performed by: SURGERY

## 2019-11-11 PROCEDURE — 94002 VENT MGMT INPAT INIT DAY: CPT

## 2019-11-11 PROCEDURE — 83735 ASSAY OF MAGNESIUM: CPT | Performed by: SURGERY

## 2019-11-11 PROCEDURE — 27210794 ZZH OR GENERAL SUPPLY STERILE: Performed by: SURGERY

## 2019-11-11 PROCEDURE — 81200005 ZZH ACQUISITION LIVER CADAVER DONOR

## 2019-11-11 PROCEDURE — 82805 BLOOD GASES W/O2 SATURATION: CPT

## 2019-11-11 PROCEDURE — 85049 AUTOMATED PLATELET COUNT: CPT | Performed by: SURGERY

## 2019-11-11 PROCEDURE — 84100 ASSAY OF PHOSPHORUS: CPT | Performed by: SURGERY

## 2019-11-11 PROCEDURE — 83605 ASSAY OF LACTIC ACID: CPT | Performed by: SURGERY

## 2019-11-11 PROCEDURE — 85025 COMPLETE CBC W/AUTO DIFF WBC: CPT | Performed by: SURGERY

## 2019-11-11 PROCEDURE — 40000196 ZZH STATISTIC RAPCV CVP MONITORING

## 2019-11-11 PROCEDURE — 20000004 ZZH R&B ICU UMMC

## 2019-11-11 PROCEDURE — 25000125 ZZHC RX 250: Performed by: STUDENT IN AN ORGANIZED HEALTH CARE EDUCATION/TRAINING PROGRAM

## 2019-11-11 PROCEDURE — 83605 ASSAY OF LACTIC ACID: CPT

## 2019-11-11 PROCEDURE — 40000048 ZZH STATISTIC DAILY SWAN MONITORING

## 2019-11-11 PROCEDURE — 87640 STAPH A DNA AMP PROBE: CPT | Performed by: SURGERY

## 2019-11-11 PROCEDURE — 85396 CLOTTING ASSAY WHOLE BLOOD: CPT | Performed by: SURGERY

## 2019-11-11 PROCEDURE — 88331 PATH CONSLTJ SURG 1 BLK 1SPC: CPT | Performed by: SURGERY

## 2019-11-11 PROCEDURE — 25800025 ZZH RX 258: Performed by: SURGERY

## 2019-11-11 PROCEDURE — 25000565 ZZH ISOFLURANE, EA 15 MIN: Performed by: SURGERY

## 2019-11-11 PROCEDURE — 27210447 ZZH PACK CELL SAVER CSP: Performed by: SURGERY

## 2019-11-11 PROCEDURE — 40000275 ZZH STATISTIC RCP TIME EA 10 MIN

## 2019-11-11 PROCEDURE — 25000131 ZZH RX MED GY IP 250 OP 636 PS 637: Mod: GY | Performed by: SURGERY

## 2019-11-11 RX ORDER — SODIUM CHLORIDE, SODIUM LACTATE, POTASSIUM CHLORIDE, CALCIUM CHLORIDE 600; 310; 30; 20 MG/100ML; MG/100ML; MG/100ML; MG/100ML
INJECTION, SOLUTION INTRAVENOUS CONTINUOUS PRN
Status: DISCONTINUED | OUTPATIENT
Start: 2019-11-11 | End: 2019-11-11

## 2019-11-11 RX ORDER — HEPARIN SODIUM 10000 [USP'U]/100ML
400 INJECTION, SOLUTION INTRAVENOUS CONTINUOUS
Status: DISCONTINUED | OUTPATIENT
Start: 2019-11-11 | End: 2019-11-12

## 2019-11-11 RX ORDER — PROPOFOL 10 MG/ML
10-20 INJECTION, EMULSION INTRAVENOUS EVERY 30 MIN PRN
Status: CANCELLED | OUTPATIENT
Start: 2019-11-11

## 2019-11-11 RX ORDER — PROPOFOL 10 MG/ML
5-75 INJECTION, EMULSION INTRAVENOUS CONTINUOUS
Status: CANCELLED | OUTPATIENT
Start: 2019-11-11

## 2019-11-11 RX ORDER — POTASSIUM CHLORIDE 7.45 MG/ML
10 INJECTION INTRAVENOUS
Status: DISCONTINUED | OUTPATIENT
Start: 2019-11-11 | End: 2019-11-14

## 2019-11-11 RX ORDER — SODIUM CHLORIDE, SODIUM LACTATE, POTASSIUM CHLORIDE, CALCIUM CHLORIDE 600; 310; 30; 20 MG/100ML; MG/100ML; MG/100ML; MG/100ML
INJECTION, SOLUTION INTRAVENOUS CONTINUOUS
Status: CANCELLED | OUTPATIENT
Start: 2019-11-11

## 2019-11-11 RX ORDER — MYCOPHENOLATE MOFETIL 250 MG/1
750 CAPSULE ORAL
Status: DISCONTINUED | OUTPATIENT
Start: 2019-11-11 | End: 2019-11-20 | Stop reason: HOSPADM

## 2019-11-11 RX ORDER — DEXTROSE MONOHYDRATE 25 G/50ML
25-50 INJECTION, SOLUTION INTRAVENOUS
Status: DISCONTINUED | OUTPATIENT
Start: 2019-11-11 | End: 2019-11-12

## 2019-11-11 RX ORDER — AMOXICILLIN 250 MG
1 CAPSULE ORAL 2 TIMES DAILY PRN
Status: CANCELLED | OUTPATIENT
Start: 2019-11-11

## 2019-11-11 RX ORDER — ONDANSETRON 4 MG/1
4 TABLET, ORALLY DISINTEGRATING ORAL EVERY 6 HOURS PRN
Status: CANCELLED | OUTPATIENT
Start: 2019-11-11

## 2019-11-11 RX ORDER — NALOXONE HYDROCHLORIDE 0.4 MG/ML
.1-.4 INJECTION, SOLUTION INTRAMUSCULAR; INTRAVENOUS; SUBCUTANEOUS
Status: DISCONTINUED | OUTPATIENT
Start: 2019-11-11 | End: 2019-11-11

## 2019-11-11 RX ORDER — SODIUM CHLORIDE, SODIUM GLUCONATE, SODIUM ACETATE, POTASSIUM CHLORIDE AND MAGNESIUM CHLORIDE 526; 502; 368; 37; 30 MG/100ML; MG/100ML; MG/100ML; MG/100ML; MG/100ML
INJECTION, SOLUTION INTRAVENOUS CONTINUOUS PRN
Status: DISCONTINUED | OUTPATIENT
Start: 2019-11-11 | End: 2019-11-11

## 2019-11-11 RX ORDER — NICOTINE POLACRILEX 4 MG
15-30 LOZENGE BUCCAL
Status: DISCONTINUED | OUTPATIENT
Start: 2019-11-11 | End: 2019-11-11

## 2019-11-11 RX ORDER — DEXTROSE MONOHYDRATE 25 G/50ML
25-50 INJECTION, SOLUTION INTRAVENOUS
Status: DISCONTINUED | OUTPATIENT
Start: 2019-11-11 | End: 2019-11-11

## 2019-11-11 RX ORDER — PROCHLORPERAZINE 25 MG
25 SUPPOSITORY, RECTAL RECTAL EVERY 12 HOURS PRN
Status: DISCONTINUED | OUTPATIENT
Start: 2019-11-11 | End: 2019-11-20 | Stop reason: HOSPADM

## 2019-11-11 RX ORDER — ALBUMIN, HUMAN INJ 5% 5 %
12.5 SOLUTION INTRAVENOUS ONCE
Status: COMPLETED | OUTPATIENT
Start: 2019-11-12 | End: 2019-11-12

## 2019-11-11 RX ORDER — NOREPINEPHRINE BITARTRATE 0.06 MG/ML
0.03-0.4 INJECTION, SOLUTION INTRAVENOUS CONTINUOUS
Status: DISCONTINUED | OUTPATIENT
Start: 2019-11-11 | End: 2019-11-13

## 2019-11-11 RX ORDER — ALBUMIN, HUMAN INJ 5% 5 %
SOLUTION INTRAVENOUS CONTINUOUS PRN
Status: DISCONTINUED | OUTPATIENT
Start: 2019-11-11 | End: 2019-11-11

## 2019-11-11 RX ORDER — SODIUM CHLORIDE, SODIUM LACTATE, POTASSIUM CHLORIDE, CALCIUM CHLORIDE 600; 310; 30; 20 MG/100ML; MG/100ML; MG/100ML; MG/100ML
INJECTION, SOLUTION INTRAVENOUS CONTINUOUS
Status: DISCONTINUED | OUTPATIENT
Start: 2019-11-12 | End: 2019-11-14

## 2019-11-11 RX ORDER — METHYLPREDNISOLONE SODIUM SUCCINATE 125 MG/2ML
100 INJECTION, POWDER, LYOPHILIZED, FOR SOLUTION INTRAMUSCULAR; INTRAVENOUS ONCE
Status: COMPLETED | OUTPATIENT
Start: 2019-11-13 | End: 2019-11-13

## 2019-11-11 RX ORDER — ONDANSETRON 2 MG/ML
4-8 INJECTION INTRAMUSCULAR; INTRAVENOUS EVERY 6 HOURS PRN
Status: DISCONTINUED | OUTPATIENT
Start: 2019-11-11 | End: 2019-11-18

## 2019-11-11 RX ORDER — SULFAMETHOXAZOLE AND TRIMETHOPRIM 400; 80 MG/1; MG/1
1 TABLET ORAL DAILY
Status: DISCONTINUED | OUTPATIENT
Start: 2019-11-11 | End: 2019-11-15

## 2019-11-11 RX ORDER — POLYETHYLENE GLYCOL 3350 17 G/17G
17 POWDER, FOR SOLUTION ORAL 2 TIMES DAILY
Status: DISCONTINUED | OUTPATIENT
Start: 2019-11-11 | End: 2019-11-20 | Stop reason: HOSPADM

## 2019-11-11 RX ORDER — SODIUM CHLORIDE, SODIUM LACTATE, POTASSIUM CHLORIDE, CALCIUM CHLORIDE 600; 310; 30; 20 MG/100ML; MG/100ML; MG/100ML; MG/100ML
INJECTION, SOLUTION INTRAVENOUS CONTINUOUS
Status: DISCONTINUED | OUTPATIENT
Start: 2019-11-11 | End: 2019-11-11

## 2019-11-11 RX ORDER — POTASSIUM CL/LIDO/0.9 % NACL 10MEQ/0.1L
10 INTRAVENOUS SOLUTION, PIGGYBACK (ML) INTRAVENOUS
Status: DISCONTINUED | OUTPATIENT
Start: 2019-11-11 | End: 2019-11-14

## 2019-11-11 RX ORDER — CALCIUM CHLORIDE 100 MG/ML
INJECTION INTRAVENOUS; INTRAVENTRICULAR PRN
Status: DISCONTINUED | OUTPATIENT
Start: 2019-11-11 | End: 2019-11-11

## 2019-11-11 RX ORDER — AMOXICILLIN 250 MG
1 CAPSULE ORAL 2 TIMES DAILY
Status: DISCONTINUED | OUTPATIENT
Start: 2019-11-11 | End: 2019-11-20 | Stop reason: HOSPADM

## 2019-11-11 RX ORDER — PROPOFOL 10 MG/ML
10-20 INJECTION, EMULSION INTRAVENOUS EVERY 30 MIN PRN
Status: DISCONTINUED | OUTPATIENT
Start: 2019-11-11 | End: 2019-11-11

## 2019-11-11 RX ORDER — PIPERACILLIN SODIUM, TAZOBACTAM SODIUM 3; .375 G/15ML; G/15ML
3.38 INJECTION, POWDER, LYOPHILIZED, FOR SOLUTION INTRAVENOUS EVERY 6 HOURS
Status: DISCONTINUED | OUTPATIENT
Start: 2019-11-11 | End: 2019-11-13

## 2019-11-11 RX ORDER — PROPOFOL 10 MG/ML
5-75 INJECTION, EMULSION INTRAVENOUS CONTINUOUS
Status: DISCONTINUED | OUTPATIENT
Start: 2019-11-11 | End: 2019-11-11

## 2019-11-11 RX ORDER — ONDANSETRON 4 MG/1
4-8 TABLET, ORALLY DISINTEGRATING ORAL EVERY 6 HOURS PRN
Status: DISCONTINUED | OUTPATIENT
Start: 2019-11-11 | End: 2019-11-20 | Stop reason: HOSPADM

## 2019-11-11 RX ORDER — ONDANSETRON 2 MG/ML
4 INJECTION INTRAMUSCULAR; INTRAVENOUS EVERY 6 HOURS PRN
Status: CANCELLED | OUTPATIENT
Start: 2019-11-11

## 2019-11-11 RX ORDER — ALBUMIN, HUMAN INJ 5% 5 %
12.5 SOLUTION INTRAVENOUS ONCE
Status: COMPLETED | OUTPATIENT
Start: 2019-11-11 | End: 2019-11-11

## 2019-11-11 RX ORDER — AMOXICILLIN 250 MG
2 CAPSULE ORAL 2 TIMES DAILY
Status: DISCONTINUED | OUTPATIENT
Start: 2019-11-11 | End: 2019-11-20 | Stop reason: HOSPADM

## 2019-11-11 RX ORDER — OXYCODONE HYDROCHLORIDE 5 MG/1
5-10 TABLET ORAL
Status: DISCONTINUED | OUTPATIENT
Start: 2019-11-11 | End: 2019-11-13

## 2019-11-11 RX ORDER — POTASSIUM CHLORIDE 29.8 MG/ML
20 INJECTION INTRAVENOUS
Status: DISCONTINUED | OUTPATIENT
Start: 2019-11-11 | End: 2019-11-14

## 2019-11-11 RX ORDER — NYSTATIN 100000/ML
10 SUSPENSION, ORAL (FINAL DOSE FORM) ORAL 4 TIMES DAILY
Status: DISCONTINUED | OUTPATIENT
Start: 2019-11-11 | End: 2019-11-12

## 2019-11-11 RX ORDER — DEXMEDETOMIDINE HYDROCHLORIDE 4 UG/ML
0.2-0.7 INJECTION, SOLUTION INTRAVENOUS CONTINUOUS
Status: DISCONTINUED | OUTPATIENT
Start: 2019-11-12 | End: 2019-11-12

## 2019-11-11 RX ORDER — NICOTINE POLACRILEX 4 MG
15-30 LOZENGE BUCCAL
Status: DISCONTINUED | OUTPATIENT
Start: 2019-11-11 | End: 2019-11-12

## 2019-11-11 RX ORDER — VALGANCICLOVIR HYDROCHLORIDE 50 MG/ML
450 POWDER, FOR SOLUTION ORAL DAILY
Status: DISCONTINUED | OUTPATIENT
Start: 2019-11-11 | End: 2019-11-18

## 2019-11-11 RX ORDER — PROPOFOL 10 MG/ML
INJECTION, EMULSION INTRAVENOUS PRN
Status: DISCONTINUED | OUTPATIENT
Start: 2019-11-11 | End: 2019-11-11

## 2019-11-11 RX ORDER — MAGNESIUM SULFATE HEPTAHYDRATE 40 MG/ML
4 INJECTION, SOLUTION INTRAVENOUS EVERY 4 HOURS PRN
Status: DISCONTINUED | OUTPATIENT
Start: 2019-11-11 | End: 2019-11-14

## 2019-11-11 RX ORDER — PROCHLORPERAZINE MALEATE 10 MG
10 TABLET ORAL EVERY 6 HOURS PRN
Status: CANCELLED | OUTPATIENT
Start: 2019-11-11

## 2019-11-11 RX ORDER — NALOXONE HYDROCHLORIDE 0.4 MG/ML
.1-.4 INJECTION, SOLUTION INTRAMUSCULAR; INTRAVENOUS; SUBCUTANEOUS
Status: DISCONTINUED | OUTPATIENT
Start: 2019-11-11 | End: 2019-11-20 | Stop reason: HOSPADM

## 2019-11-11 RX ORDER — POTASSIUM CHLORIDE 750 MG/1
20-40 TABLET, EXTENDED RELEASE ORAL
Status: DISCONTINUED | OUTPATIENT
Start: 2019-11-11 | End: 2019-11-14

## 2019-11-11 RX ORDER — METHYLPREDNISOLONE SODIUM SUCCINATE 125 MG/2ML
50 INJECTION, POWDER, LYOPHILIZED, FOR SOLUTION INTRAMUSCULAR; INTRAVENOUS ONCE
Status: COMPLETED | OUTPATIENT
Start: 2019-11-14 | End: 2019-11-14

## 2019-11-11 RX ORDER — FENTANYL CITRATE 50 UG/ML
INJECTION, SOLUTION INTRAMUSCULAR; INTRAVENOUS PRN
Status: DISCONTINUED | OUTPATIENT
Start: 2019-11-11 | End: 2019-11-11

## 2019-11-11 RX ORDER — TACROLIMUS 0.5 MG/1
2 CAPSULE, GELATIN COATED ORAL
Status: DISCONTINUED | OUTPATIENT
Start: 2019-11-11 | End: 2019-11-13

## 2019-11-11 RX ORDER — PROCHLORPERAZINE 25 MG
25 SUPPOSITORY, RECTAL RECTAL EVERY 12 HOURS PRN
Status: CANCELLED | OUTPATIENT
Start: 2019-11-11

## 2019-11-11 RX ORDER — PROCHLORPERAZINE MALEATE 5 MG
10 TABLET ORAL EVERY 6 HOURS PRN
Status: DISCONTINUED | OUTPATIENT
Start: 2019-11-11 | End: 2019-11-18

## 2019-11-11 RX ORDER — LIDOCAINE HYDROCHLORIDE 20 MG/ML
INJECTION, SOLUTION INFILTRATION; PERINEURAL PRN
Status: DISCONTINUED | OUTPATIENT
Start: 2019-11-11 | End: 2019-11-11

## 2019-11-11 RX ORDER — AMOXICILLIN 250 MG
2 CAPSULE ORAL 2 TIMES DAILY PRN
Status: CANCELLED | OUTPATIENT
Start: 2019-11-11

## 2019-11-11 RX ORDER — POTASSIUM CHLORIDE 1.5 G/1.58G
20-40 POWDER, FOR SOLUTION ORAL
Status: DISCONTINUED | OUTPATIENT
Start: 2019-11-11 | End: 2019-11-14

## 2019-11-11 RX ORDER — GLYCOPYRROLATE 0.2 MG/ML
INJECTION, SOLUTION INTRAMUSCULAR; INTRAVENOUS PRN
Status: DISCONTINUED | OUTPATIENT
Start: 2019-11-11 | End: 2019-11-11

## 2019-11-11 RX ORDER — NALOXONE HYDROCHLORIDE 0.4 MG/ML
.1-.4 INJECTION, SOLUTION INTRAMUSCULAR; INTRAVENOUS; SUBCUTANEOUS
Status: CANCELLED | OUTPATIENT
Start: 2019-11-11

## 2019-11-11 RX ORDER — PREDNISONE 5 MG/1
10 TABLET ORAL ONCE
Status: COMPLETED | OUTPATIENT
Start: 2019-11-16 | End: 2019-11-16

## 2019-11-11 RX ORDER — PROPOFOL 10 MG/ML
INJECTION, EMULSION INTRAVENOUS CONTINUOUS PRN
Status: DISCONTINUED | OUTPATIENT
Start: 2019-11-11 | End: 2019-11-11

## 2019-11-11 RX ORDER — MYCOPHENOLATE MOFETIL 200 MG/ML
750 POWDER, FOR SUSPENSION ORAL
Status: DISCONTINUED | OUTPATIENT
Start: 2019-11-11 | End: 2019-11-18

## 2019-11-11 RX ORDER — VALGANCICLOVIR 450 MG/1
450 TABLET, FILM COATED ORAL DAILY
Status: DISCONTINUED | OUTPATIENT
Start: 2019-11-11 | End: 2019-11-20 | Stop reason: HOSPADM

## 2019-11-11 RX ORDER — HYDROMORPHONE HYDROCHLORIDE 1 MG/ML
.3-.5 INJECTION, SOLUTION INTRAMUSCULAR; INTRAVENOUS; SUBCUTANEOUS
Status: DISCONTINUED | OUTPATIENT
Start: 2019-11-11 | End: 2019-11-15

## 2019-11-11 RX ADMIN — PHENYLEPHRINE HYDROCHLORIDE 200 MCG: 10 INJECTION INTRAVENOUS at 08:53

## 2019-11-11 RX ADMIN — SENNOSIDES AND DOCUSATE SODIUM 1 TABLET: 8.6; 5 TABLET ORAL at 19:47

## 2019-11-11 RX ADMIN — PHENYLEPHRINE HYDROCHLORIDE 200 MCG: 10 INJECTION INTRAVENOUS at 12:50

## 2019-11-11 RX ADMIN — SODIUM CHLORIDE 500 ML: 9 INJECTION, SOLUTION INTRAVENOUS at 04:14

## 2019-11-11 RX ADMIN — PIPERACILLIN AND TAZOBACTAM 2.25 G: 3; .375 INJECTION, POWDER, LYOPHILIZED, FOR SOLUTION INTRAVENOUS at 09:40

## 2019-11-11 RX ADMIN — PIPERACILLIN AND TAZOBACTAM 3.38 G: 3; .375 INJECTION, POWDER, LYOPHILIZED, FOR SOLUTION INTRAVENOUS at 07:38

## 2019-11-11 RX ADMIN — VALGANCICLOVIR HYDROCHLORIDE 450 MG: 50 POWDER, FOR SOLUTION ORAL at 21:37

## 2019-11-11 RX ADMIN — ROCURONIUM BROMIDE 20 MG: 10 INJECTION INTRAVENOUS at 07:51

## 2019-11-11 RX ADMIN — CALCIUM CHLORIDE 0.1 G: 100 INJECTION, SOLUTION INTRAVENOUS at 08:49

## 2019-11-11 RX ADMIN — HUMAN INSULIN 4 UNITS/HR: 100 INJECTION, SOLUTION SUBCUTANEOUS at 19:54

## 2019-11-11 RX ADMIN — FENTANYL CITRATE 50 MCG: 50 INJECTION, SOLUTION INTRAMUSCULAR; INTRAVENOUS at 16:28

## 2019-11-11 RX ADMIN — ROCURONIUM BROMIDE 20 MG: 10 INJECTION INTRAVENOUS at 12:02

## 2019-11-11 RX ADMIN — SULFAMETHOXAZOLE AND TRIMETHOPRIM 1 TABLET: 400; 80 TABLET ORAL at 19:47

## 2019-11-11 RX ADMIN — Medication 2 MG: at 19:39

## 2019-11-11 RX ADMIN — ROCURONIUM BROMIDE 20 MG: 10 INJECTION INTRAVENOUS at 10:06

## 2019-11-11 RX ADMIN — SODIUM PHOSPHATE, MONOBASIC, MONOHYDRATE AND SODIUM PHOSPHATE, DIBASIC, ANHYDROUS 10 MMOL: 276; 142 INJECTION, SOLUTION INTRAVENOUS at 21:38

## 2019-11-11 RX ADMIN — CALCIUM CHLORIDE 1 G: 100 INJECTION, SOLUTION INTRAVENOUS at 13:00

## 2019-11-11 RX ADMIN — SODIUM BICARBONATE 50 MEQ: 84 INJECTION, SOLUTION INTRAVENOUS at 12:35

## 2019-11-11 RX ADMIN — Medication 2 G: at 21:09

## 2019-11-11 RX ADMIN — ROCURONIUM BROMIDE 20 MG: 10 INJECTION INTRAVENOUS at 13:42

## 2019-11-11 RX ADMIN — PHENYLEPHRINE HYDROCHLORIDE 100 MCG: 10 INJECTION INTRAVENOUS at 10:52

## 2019-11-11 RX ADMIN — SODIUM CHLORIDE, SODIUM GLUCONATE, SODIUM ACETATE, POTASSIUM CHLORIDE AND MAGNESIUM CHLORIDE: 526; 502; 368; 37; 30 INJECTION, SOLUTION INTRAVENOUS at 07:10

## 2019-11-11 RX ADMIN — SODIUM CHLORIDE, SODIUM GLUCONATE, SODIUM ACETATE, POTASSIUM CHLORIDE AND MAGNESIUM CHLORIDE: 526; 502; 368; 37; 30 INJECTION, SOLUTION INTRAVENOUS at 14:27

## 2019-11-11 RX ADMIN — FENTANYL CITRATE 50 MCG: 50 INJECTION, SOLUTION INTRAMUSCULAR; INTRAVENOUS at 15:07

## 2019-11-11 RX ADMIN — ROCURONIUM BROMIDE 20 MG: 10 INJECTION INTRAVENOUS at 08:43

## 2019-11-11 RX ADMIN — FENTANYL CITRATE 200 MCG: 50 INJECTION, SOLUTION INTRAMUSCULAR; INTRAVENOUS at 08:12

## 2019-11-11 RX ADMIN — CALCIUM CHLORIDE 1 G: 100 INJECTION, SOLUTION INTRAVENOUS at 11:00

## 2019-11-11 RX ADMIN — NOREPINEPHRINE BITARTRATE 9.6 MCG: 1 INJECTION INTRAVENOUS at 12:35

## 2019-11-11 RX ADMIN — GLYCOPYRROLATE 0.1 MG: 0.2 INJECTION, SOLUTION INTRAMUSCULAR; INTRAVENOUS at 09:55

## 2019-11-11 RX ADMIN — PHENYLEPHRINE HYDROCHLORIDE 100 MCG: 10 INJECTION INTRAVENOUS at 12:25

## 2019-11-11 RX ADMIN — PROPOFOL 15 MCG/KG/MIN: 10 INJECTION, EMULSION INTRAVENOUS at 20:07

## 2019-11-11 RX ADMIN — FENTANYL CITRATE 100 MCG: 50 INJECTION, SOLUTION INTRAMUSCULAR; INTRAVENOUS at 07:57

## 2019-11-11 RX ADMIN — PIPERACILLIN AND TAZOBACTAM 2.25 G: 3; .375 INJECTION, POWDER, LYOPHILIZED, FOR SOLUTION INTRAVENOUS at 13:36

## 2019-11-11 RX ADMIN — FENTANYL CITRATE 50 MCG: 50 INJECTION, SOLUTION INTRAMUSCULAR; INTRAVENOUS at 10:05

## 2019-11-11 RX ADMIN — HUMAN INSULIN 2 UNITS/HR: 100 INJECTION, SOLUTION SUBCUTANEOUS at 08:19

## 2019-11-11 RX ADMIN — NYSTATIN 1000000 UNITS: 500000 SUSPENSION ORAL at 20:29

## 2019-11-11 RX ADMIN — PIPERACILLIN AND TAZOBACTAM 2.25 G: 3; .375 INJECTION, POWDER, LYOPHILIZED, FOR SOLUTION INTRAVENOUS at 15:38

## 2019-11-11 RX ADMIN — ROCURONIUM BROMIDE 20 MG: 10 INJECTION INTRAVENOUS at 12:42

## 2019-11-11 RX ADMIN — SODIUM CHLORIDE 250 MG: 9 INJECTION, SOLUTION INTRAVENOUS at 13:44

## 2019-11-11 RX ADMIN — ALBUMIN HUMAN: 0.05 INJECTION, SOLUTION INTRAVENOUS at 10:56

## 2019-11-11 RX ADMIN — CALCIUM CHLORIDE 1 G: 100 INJECTION, SOLUTION INTRAVENOUS at 12:35

## 2019-11-11 RX ADMIN — ALBUMIN HUMAN: 0.05 INJECTION, SOLUTION INTRAVENOUS at 09:56

## 2019-11-11 RX ADMIN — ROCURONIUM BROMIDE 10 MG: 10 INJECTION INTRAVENOUS at 07:36

## 2019-11-11 RX ADMIN — Medication 0.03 MCG/KG/MIN: at 07:39

## 2019-11-11 RX ADMIN — NOREPINEPHRINE BITARTRATE 3.2 MCG: 1 INJECTION INTRAVENOUS at 12:58

## 2019-11-11 RX ADMIN — LIDOCAINE HYDROCHLORIDE 40 MG: 20 INJECTION, SOLUTION INFILTRATION; PERINEURAL at 06:35

## 2019-11-11 RX ADMIN — POLYETHYLENE GLYCOL 3350 17 G: 17 POWDER, FOR SOLUTION ORAL at 19:58

## 2019-11-11 RX ADMIN — FENTANYL CITRATE 50 MCG: 50 INJECTION, SOLUTION INTRAMUSCULAR; INTRAVENOUS at 16:09

## 2019-11-11 RX ADMIN — MYCOPHENOLATE MOFETIL 750 MG: 200 POWDER, FOR SUSPENSION ORAL at 19:39

## 2019-11-11 RX ADMIN — Medication 0.5 UNITS: at 13:12

## 2019-11-11 RX ADMIN — Medication 50 MCG/HR: at 18:32

## 2019-11-11 RX ADMIN — ROCURONIUM BROMIDE 70 MG: 10 INJECTION INTRAVENOUS at 06:35

## 2019-11-11 RX ADMIN — PHENYLEPHRINE HYDROCHLORIDE 200 MCG: 10 INJECTION INTRAVENOUS at 08:24

## 2019-11-11 RX ADMIN — PROPOFOL 25 MCG/KG/MIN: 10 INJECTION, EMULSION INTRAVENOUS at 14:48

## 2019-11-11 RX ADMIN — PROPOFOL 150 MG: 10 INJECTION, EMULSION INTRAVENOUS at 06:35

## 2019-11-11 RX ADMIN — DEXTROSE AND SODIUM CHLORIDE: 5; 450 INJECTION, SOLUTION INTRAVENOUS at 17:41

## 2019-11-11 RX ADMIN — SODIUM CHLORIDE, SODIUM GLUCONATE, SODIUM ACETATE, POTASSIUM CHLORIDE AND MAGNESIUM CHLORIDE: 526; 502; 368; 37; 30 INJECTION, SOLUTION INTRAVENOUS at 06:23

## 2019-11-11 RX ADMIN — PIPERACILLIN SODIUM AND TAZOBACTAM SODIUM 3.38 G: 3; .375 INJECTION, POWDER, LYOPHILIZED, FOR SOLUTION INTRAVENOUS at 22:21

## 2019-11-11 RX ADMIN — SODIUM CHLORIDE, POTASSIUM CHLORIDE, SODIUM LACTATE AND CALCIUM CHLORIDE 500 ML: 600; 310; 30; 20 INJECTION, SOLUTION INTRAVENOUS at 18:04

## 2019-11-11 RX ADMIN — CALCIUM CHLORIDE 0.9 G: 100 INJECTION, SOLUTION INTRAVENOUS at 10:00

## 2019-11-11 RX ADMIN — SODIUM CHLORIDE, POTASSIUM CHLORIDE, SODIUM LACTATE AND CALCIUM CHLORIDE: 600; 310; 30; 20 INJECTION, SOLUTION INTRAVENOUS at 07:47

## 2019-11-11 RX ADMIN — ALBUMIN HUMAN 12.5 G: 0.05 INJECTION, SOLUTION INTRAVENOUS at 22:03

## 2019-11-11 RX ADMIN — SODIUM CHLORIDE 250 MG: 9 INJECTION, SOLUTION INTRAVENOUS at 12:35

## 2019-11-11 RX ADMIN — CALCIUM CHLORIDE 1 G: 100 INJECTION, SOLUTION INTRAVENOUS at 12:00

## 2019-11-11 RX ADMIN — SODIUM CHLORIDE, SODIUM GLUCONATE, SODIUM ACETATE, POTASSIUM CHLORIDE AND MAGNESIUM CHLORIDE: 526; 502; 368; 37; 30 INJECTION, SOLUTION INTRAVENOUS at 14:48

## 2019-11-11 RX ADMIN — ALBUMIN HUMAN: 0.05 INJECTION, SOLUTION INTRAVENOUS at 14:59

## 2019-11-11 RX ADMIN — Medication 0.5 UNITS: at 13:28

## 2019-11-11 RX ADMIN — VASOPRESSIN 0.5 UNITS/HR: 20 INJECTION INTRAVENOUS at 14:27

## 2019-11-11 RX ADMIN — ALBUMIN HUMAN: 0.05 INJECTION, SOLUTION INTRAVENOUS at 09:40

## 2019-11-11 RX ADMIN — PIPERACILLIN AND TAZOBACTAM 2.25 G: 3; .375 INJECTION, POWDER, LYOPHILIZED, FOR SOLUTION INTRAVENOUS at 11:48

## 2019-11-11 ASSESSMENT — ACTIVITIES OF DAILY LIVING (ADL): ADLS_ACUITY_SCORE: 14

## 2019-11-11 NOTE — PLAN OF CARE
BP 99/58 (BP Location: Right arm)   Pulse 65   Temp 98.2  F (36.8  C) (Oral)   Resp 16   Wt 77.1 kg (169 lb 14.4 oz)   SpO2 95%   BMI 25.09 kg/m      Pt. hypotensive overnight & c/o lightheaded (see v.s. flowsheet) & surgery on-call notified & assessed pt. MD ordered NS 500cc bolus x1 & administered & recheck BP 99/58 & lightheaded resolved. AOVSS.  Pt. took 2nd pre-op shower tonight. B. Pt. up ad karely. Pt. went to OR around 0615. Pt's belongings on 7A.

## 2019-11-11 NOTE — ANESTHESIA PROCEDURE NOTES
PA Catheter Insertion Note  Anesthesiologist: James Cobb DO      Introducer: Introducer already in place.   Skin prep:  Chloraprep Cap, Full body drape, hand hygiene, Mask, Sterile gloves and Sterile gown    PA Catheter type:  Thermodilution    Distance catheter advanced:  45 cm.    Appropriate RA, RV, PA  waveforms?: Yes    Dressing:  Biopatch and Tegaderm    Complications:  None apparent        PA catheter notes:  All port ws flushed appropriately. The balloon is down after it was placed in the PA.

## 2019-11-11 NOTE — CONSULTS
SURGICAL ICU ADMISSION NOTE  11/11/2019    PRIMARY TEAM: Transplant Surgery  PRIMARY PHYSICIAN: Dr. Ramos     REASON FOR CRITICAL CARE ADMISSION: Postop liver transplant    ADMITTING PHYSICIAN: Dr. 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 Ramos      PLAN:   Neuro/ pain/ sedation:  #Acute postop pain  - Monitor neurological status. Notify the MD for any acute changes in exam.  - Propofol gtt for sedation  - Fentanyl gtt for pain       Pulmonary care:   -Supplemental oxygen to keep saturation above 92 %.  -VC//12/5/40  -Plan to PST in the AM and extubate if tolerating well     Cardiovascular:    #HTN  #HLD  #CAD  #Acute blood loss anemia  - Monitor hemodynamic status.   - vasopressin gtt at 0.5units/hr and norepinephrine gtt at 0.1 mcg/hr. Will wean off as able  - Intra-operative blood loss of 2400cc, resuscitated with 6U PRBC, 8 FFP, 1U plts, 1U cryo, 3300cc crystalloid, 1L albumin  - PTA carvedilol - held      GI care:   #ESTRADA  #HCC s/p DDLT  #GERD  - NPO except ice chips and medications.  - Plan for feeding tube placement tomorrow  - Bowel regimen with senokot and miralax      Fluids/ Electrolytes/ Nutrition:   - D5 1/2 NS at 100 ml/hr for IV fluid hydration  - ICU electrolyte replacement protocol  - No indication for parenteral nutrition.  - Nutrition consulted. Appreciate recs     Renal/ Fluid Balance:    #BPH  - Urine output has been adequate so far  - Will continue to monitor intake and output.  - Carroll in place for strict I/Os     Endocrine:    #DMII  #steroid induced hyperglycemia  - Insulin gtt for BG goals < 180      ID/ Antibiotics:  - Zosyn 3.375g IV q6hr  - Bactrim/Septra qDay  - Valcyte 900mg qDay  - Methylprednisolone taper   - Tacrolimus   - Nystatin suspension   - Mycophenolate      Heme:     #Portal vein  thrombosis  - Will begin therapeutic heparin gtt at 400U /hr POD1  - Post op: hgb 9.2 plt 57 INR 1.60 fibrinogen 192   -- Transfusion goals: hgb < 8.0; plts < 30; fibrinogen > 200; INR < 2.0       Prophylaxis:    - Mechanical prophylaxis for DVT.   - Heparin gtt  - No indication for GI prophylaxis     MSK:    - PT and OT consulted. Appreciate recs.       Lines/ tubes/ drains:  - PIV, A-line, Carroll, ETT, PA catheter, RIJ triple MAC x2     Disposition:  - Surgical ICU.     Patient seen, findings and plan discussed with surgical ICU staff Dr. Elda Klely MD  Anesthesiology PGY1  [phone]    - - - - - - - - - - - - - - - - - - - - - - - - - - - - - - - - - - - - - - - - - - - - - - - - - - - - - - - - - - - - - - - - - - - - - - - -     HISTORY PRESENTING ILLNESS: Frandy Workman is a 54 year old male with PMHx of DMII, cirrhosis secondary to ESTRADA, HLD, HTN, GERD, BPH who presents with hepatocellular carcinoma. He used to live in [place], but moved to [place] to be closer to his daughter, although he seems to be estranged from her now, as well as the rest of his family.  He has cirrhosis felt secondary to ESTRADA, and says that he was on the transplant list at [place] when he lived in [place].  He follows with Dr. Banuelos here in the hepatology clinic now.  He underwent routine screening ultrasound on 12/10/18, which showed a non-specific left hepatic lobe lesion.  MRI abdomen on 12/23/19 showed 2 lesions, measuring 3.1 cm and 1.1 cm in hepatic segments 4b and 3, respectively, that were LIRADS 5.  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 He underwent TACE on 1/22/19 to segment IV lesion and CT-guided microwave ablation on 1/23/19 to segment III lesion. He is scheduled to undergo liver transplant 11/11/2019.     REVIEW OF SYSTEMS: 10 point ROS neg other than the symptoms noted above in the HPI.    PAST MEDICAL HISTORY:   Past Medical History:   Diagnosis Date     Anemia 2013    Low blood  plates current is 37     Arthritis      BPH (benign prostatic hyperplasia)      CAD (coronary artery disease) 4/1/2019     Cholelithiasis      Conductive hearing loss 8/16/2017    Have a lump on my right side of my face.  Had wax discharge     Depressive disorder 1986    Suffer effects throughout life     Gastroesophageal reflux disease 12/1/2014    Being treated with Prilosac     HCC (hepatocellular carcinoma) (H) 1/22/2019     History of diabetic retinopathy 07/2018     HTN (hypertension)      Hyperlipidemia      Liver cirrhosis secondary to ESTRADA (H)      Portal vein thrombosis 4/11/2019     Type II diabetes mellitus (H)     Insulin adminstered BID daily.        SURGICAL HISTORY:   Past Surgical History:   Procedure Laterality Date     COLONOSCOPY      2015     CV HEART CATHETERIZATION WITH POSSIBLE INTERVENTION N/A 2/26/2019    Procedure: CORS;  Surgeon: Jagdish Hoyt MD;  Location:  HEART CARDIAC CATH LAB     ESOPHAGOSCOPY, GASTROSCOPY, DUODENOSCOPY (EGD), COMBINED N/A 11/17/2016    Procedure: COMBINED ESOPHAGOSCOPY, GASTROSCOPY, DUODENOSCOPY (EGD);  Surgeon: Santi Rosas MD;  Location:  GI     ESOPHAGOSCOPY, GASTROSCOPY, DUODENOSCOPY (EGD), COMBINED N/A 11/17/2017    Procedure: COMBINED ESOPHAGOSCOPY, GASTROSCOPY, DUODENOSCOPY (EGD);  EGD;  Surgeon: Santi Rosas MD;  Location:  GI     ESOPHAGOSCOPY, GASTROSCOPY, DUODENOSCOPY (EGD), COMBINED N/A 12/28/2018    Procedure: EGD;  Surgeon: Santi Rosas MD;  Location:  OR     HEAD & NECK SURGERY      12/2017 at [facility].      IMPLANT GOLD WEIGHT EYELID Right 11/16/2017    Procedure: IMPLANT WEIGHT EYELID;  Right Upper Eyelid Weight, right tarsal strip lower eyelid;  Surgeon: Milana Malave MD;  Location:  OR     IR CHEMO EMBOLIZATION  1/22/2019     KNEE SURGERY Left      ORTHOPEDIC SURGERY       PAROTIDECTOMY, RADICAL NECK DISSECTION Right 11/2/2017    Procedure: PAROTIDECTOMY, RADICAL NECK DISSECTION;  Right  Superfacial Parotidectomy , Facial nerve repair. with [facility] facial nerve monitor.;  Surgeon: Asiya Morgan MD;  Location: [facility] OR     PICC INSERTION Left 11/06/2017    4fr SL BioFlo PICC, 44cm in the L basilic vein w/ tip in the low SVC     VASCULAR SURGERY         SOCIAL HISTORY:   Social History     Socioeconomic History     Marital status:      Spouse name: Not on file     Number of children: Not on file     Years of education: Not on file     Highest education level: Not on file   Occupational History     Not on file   Social Needs     Financial resource strain: Not on file     Food insecurity:     Worry: Not on file     Inability: Not on file     Transportation needs:     Medical: Not on file     Non-medical: Not on file   Tobacco Use     Smoking status: Never Smoker     Smokeless tobacco: Former User     Types: Chew     Tobacco comment: 1 tin per week   Substance and Sexual Activity     Alcohol use: No     Alcohol/week: 0.0 standard drinks     Comment: quit Sept. 1996     Drug use: No     Sexual activity: Not Currently     Partners: Female     Birth control/protection: Condom   Lifestyle     Physical activity:     Days per week: Not on file     Minutes per session: Not on file     Stress: Not on file   Relationships     Social connections:     Talks on phone: Not on file     Gets together: Not on file     Attends [employer] service: Not on file     Active member of club or organization: Not on file     Attends meetings of clubs or organizations: Not on file     Relationship status: Not on file     Intimate partner violence:     Fear of current or ex partner: Not on file     Emotionally abused: Not on file     Physically abused: Not on file     Forced sexual activity: Not on file   Other Topics Concern     Parent/sibling w/ CABG, MI or angioplasty before 65F 55M? Yes   Social History Narrative     Not on file       FAMILY HISTORY: No bleeding/clotting disorders nor problems with anesthesia.      ALLERGIES:      Allergies   Allergen Reactions     Codeine Other (See Comments)     Cannot take due to liver  Cannot tolerate oral narcotics     Seasonal Allergies      Sneezing, coughing, runny and itchy eyes       MEDICATIONS:  No current facility-administered medications on file prior to encounter.   alpha-lipoic acid 600 MG capsule, Take 1 capsule (600 mg) by mouth daily  Artificial Tear Solution (SM ARTIFICIAL TEARS) SOLN, Place 1 drop into the right eye every hour as needed Apply at least 4 times daily and as needed for dry eye  aspirin (ASA) 81 MG EC tablet, Take 1 tablet (81 mg) by mouth daily  BD VIKTORIA U/F 32G X 4 MM insulin pen needle, Use 5 per day  carvedilol (COREG) 12.5 MG tablet, TAKE 1 TABLET(12.5 MG) BY MOUTH TWICE DAILY WITH MEALS  Cholecalciferol (VITAMIN D-3 PO), Take 2,000 Units by mouth every morning   dapagliflozin (FARXIGA) 10 MG TABS tablet, Take 1 tablet (10 mg) by mouth daily  econazole nitrate 1 % external cream, Apply topically daily To feet and toenails.  ferrous sulfate (FEROSUL) 325 (65 Fe) MG tablet, Take 1 tablet (325 mg) by mouth 3 times daily (with meals)  insulin degludec (TRESIBA) 200 UNIT/ML pen, Take 70 units daily.  metFORMIN (GLUCOPHAGE-XR) 500 MG 24 hr tablet, Take 1 tablet (500 mg) by mouth daily (with breakfast)  Multiple Vitamin (THERAVITE PO), Take 1 tablet by mouth every morning   NOVOLOG FLEXPEN 100 UNIT/ML soln, INJECT 1 UNIT PER 4 GRAMS OF CARBS AT MEALS AND SNACKS. CORRECTION SCALE OF 1 UNITS PER 25 OVER [address] AVE DOSE 75 UNITERS PER DAY  omeprazole (PRILOSEC) 40 MG DR capsule, Take 1 capsule (40 mg) by mouth daily  potassium chloride ER (K-DUR/KLOR-CON M) 20 MEQ CR tablet, Take 1 tablet (20 mEq) by mouth daily  rosuvastatin (CRESTOR) 20 MG tablet, Take 1 tablet (20 mg) by mouth daily  tamsulosin (FLOMAX) 0.4 MG capsule, TAKE 1 CAPSULE(0.4 MG) BY MOUTH DAILY  traZODone (DESYREL) 50 MG tablet, Take 1 tablet (50 mg) by mouth At Bedtime  XIFAXAN 550 MG TABS tablet,  TAKE 1 TABLET(550 MG) BY MOUTH TWICE DAILY  blood glucose monitoring (ACCU-CHEK EDINSON PLUS) test strip, Use to test blood sugar 4 times daily  blood glucose monitoring (ACCU-CHEK FASTCLIX) lancets, Use to test blood sugar 4 times daily or as directed.  1 box = 102 lancets  cyanocobalamin (RA VITAMIN B-12 TR) 1000 MCG TBCR, Take 5,000 mcg by mouth every morning   diclofenac (VOLTAREN) 1 % topical gel, Place 2 g onto the skin 4 times daily  lactulose (CHRONULAC) 10 GM/15ML solution, Take 45 mLs (30 g) by mouth 4 times daily  OLANZapine (ZYPREXA) 2.5 MG tablet, TAKE 1 TABLET BY MOUTH EVERY NIGHT AT BEDTIME        PHYSICAL EXAMINATION:  Temp:  [95.7  F (35.4  C)-98.2  F (36.8  C)] 98.2  F (36.8  C)  Pulse:  [64-67] 65  Heart Rate:  [71-91] 71  Resp:  [16] 16  BP: ()/(54-69) 99/58  SpO2:  [95 %-99 %] 95 %    General: Sedated, laying in bed  HEENT: RIJ MAC x2. No signs of edema or hematoma  Neuro: sedated with a RAAS -4  CV: RRR. No m/r/g. Currently on 2 vasoactive medications for BP control  Pulm: Mech vent.  Abd: Soft obese abdomen. Non-distended. Incisions c/d/i. Mild serosangenous drainage.   Extremities: Pulses palpable in both upper and lower extremities b/l. 2+ pulses.  Skin: Pale skin, no visible wounds, warm extremities with adequate cap refill  Incisions: c/d/i    LABS: Reviewed.   Arterial Blood Gases   Recent Labs   Lab 11/11/19 0843 11/11/19  0725   PH 7.42 7.40   PCO2 37 39   PO2 127* 157*   HCO3 24 24     Complete Blood Count   Recent Labs   Lab 11/11/19  0843 11/11/19  0725 11/10/19  1714   WBC  --   --  3.2*   HGB 11.8* 12.5* 12.4*   PLT  --   --  35*     Basic Metabolic Panel  Recent Labs   Lab 11/11/19  0843 11/11/19  0725 11/10/19  1714    142 140   POTASSIUM 4.5 4.0 4.3   CHLORIDE  --   --  112*   CO2  --   --  24   BUN  --   --  18   CR  --   --  1.18   * 170* 154*     Liver Function Tests  Recent Labs   Lab 11/11/19  0700 11/10/19  1714   AST  --  64*   ALT  --  64   ALKPHOS  --   180*   BILITOTAL  --  1.4*   ALBUMIN  --  2.8*   INR 1.46* 1.31*     Pancreatic Enzymes  Recent Labs   Lab 11/10/19  1714   AMYLASE 41     Coagulation Profile  Recent Labs   Lab 11/11/19  0700 11/10/19  1714   INR 1.46* 1.31*   PTT 35 32       IMAGING:  Recent Results (from the past 24 hour(s))   XR Chest 2 Views    Narrative    Exam: XR CHEST 2 VW, 11/10/2019 5:49 PM    Indication: preop    Comparison: 2/4/2018    Findings:   PA and lateral upright view of the chest. The cardiomediastinal  silhouette and upper abdomen are unremarkable.    There is no pleural effusion. No pneumothorax. No focal airspace  opacities.      Impression    IMPRESSION: No acute cardiopulmonary findings.    I have personally reviewed the examination and initial interpretation  and I agree with the findings.    ORALIA DURAN MD         @[email or web address] [email or web address]@

## 2019-11-11 NOTE — PROVIDER NOTIFICATION
Notified Resident at 0345 AM regarding hypotension.      Spoke with:  RADHIKA Livingston MD    Orders were obtained.    Comments:  MD notified of pt's BPs: 85/57, 92/54, HR: 70's-80's & pt. stating he feels lightheaded. B. Pt. triggered sepsis & will get Lactic acid drawn. MD assessed pt. & ordered NS 500cc bolus x1 which was administered. Will monitor pt's BP closely & notify MD if no response post treatment.

## 2019-11-11 NOTE — ANESTHESIA PREPROCEDURE EVALUATION
Anesthesia Pre-Procedure Evaluation    Patient: Frandy Workman   MRN:     [id number] Gender:   male   Age:    54 year old :      1965        Preoperative Diagnosis: * No pre-op diagnosis entered *   Procedure(s):  TRANSPLANT, LIVER, RECIPIENT,  DONOR     Past Medical History:   Diagnosis Date     Anemia     Low blood plates current is 37     Arthritis      BPH (benign prostatic hyperplasia)      CAD (coronary artery disease) 2019     Cholelithiasis      Conductive hearing loss 2017    Have a lump on my right side of my face.  Had wax discharge     Depressive disorder 1986    Suffer effects throughout life     Gastroesophageal reflux disease 2014    Being treated with Prilosac     HCC (hepatocellular carcinoma) (H) 2019     History of diabetic retinopathy 2018     HTN (hypertension)      Hyperlipidemia      Liver cirrhosis secondary to ESTRADA (H)      Portal vein thrombosis 2019     Type II diabetes mellitus (H)     Insulin adminstered BID daily.       Past Surgical History:   Procedure Laterality Date     COLONOSCOPY           CV HEART CATHETERIZATION WITH POSSIBLE INTERVENTION N/A 2019    Procedure: CORS;  Surgeon: Jagdish Hoyt MD;  Location: [facility] CATH [facility]     ESOPHAGOSCOPY, GASTROSCOPY, DUODENOSCOPY (EGD), COMBINED N/A 2016    Procedure: COMBINED ESOPHAGOSCOPY, GASTROSCOPY, DUODENOSCOPY (EGD);  Surgeon: Santi Rosas MD;  Location:  GI     ESOPHAGOSCOPY, GASTROSCOPY, DUODENOSCOPY (EGD), COMBINED N/A 2017    Procedure: COMBINED ESOPHAGOSCOPY, GASTROSCOPY, DUODENOSCOPY (EGD);  EGD;  Surgeon: Santi Rosas MD;  Location:  GI     ESOPHAGOSCOPY, GASTROSCOPY, DUODENOSCOPY (EGD), COMBINED N/A 2018    Procedure: EGD;  Surgeon: Santi Rosas MD;  Location:  OR     HEAD & NECK SURGERY      2017 at [facility].      IMPLANT GOLD WEIGHT EYELID Right 2017    Procedure: IMPLANT WEIGHT EYELID;   Right Upper Eyelid Weight, right tarsal strip lower eyelid;  Surgeon: Milana Malave MD;  Location: [facility] OR     IR CHEMO EMBOLIZATION  1/22/2019     KNEE SURGERY Left      ORTHOPEDIC SURGERY       PAROTIDECTOMY, RADICAL NECK DISSECTION Right 11/2/2017    Procedure: PAROTIDECTOMY, RADICAL NECK DISSECTION;  Right Superfacial Parotidectomy , Facial nerve repair. with NIM facial nerve monitor.;  Surgeon: Asiya Morgan MD;  Location: [facility] OR     PICC INSERTION Left 11/06/2017    4fr SL BioFlo PICC, 44cm in the L basilic vein w/ tip in the low SVC     VASCULAR SURGERY            Anesthesia Evaluation     . Pt has had prior anesthetic. Type: General           ROS/MED HX    ENT/Pulmonary:  - neg pulmonary ROS     Neurologic:  - neg neurologic ROS     Cardiovascular: Comment: Stress Test: 2/2019  Conclusions: dobutamine stress echocardiogram test inconclusive for ischemia  given inability to achieve target HR (66% MPHR).     Patient experienced no chest pain with peak stress.  Test terminated due to maximum infusion without HR response.  Normal BP and blunted HR response to dobutamine.  At baseline, LVEF 65%, normal wall motion.  At low and peak dose dobutamine, walls recruit normally. LV function augments  to 75%.  EKG at rest and with stress with no ischemic changes.  Screening 2D echocardiogram with Doppler interrogation demonstrated no  significant valvular disease. Aortic root is dilated (4.0 cm at Sinuses of  Valsalva, indexed 2.0 cm/m2). Normal PA systolic pressure (17 mmHg over RA  pressure).    (+) Dyslipidemia, hypertension----. : . . . :. .       METS/Exercise Tolerance:     Hematologic: Comments: THrombocytopenia due to splenic sequestration    (+) Anemia, History of Transfusion -      Musculoskeletal:  - neg musculoskeletal ROS       GI/Hepatic: Comment: Patient with history of ESTRADA and HCC  History of hepatic encephalopathy. Currently no symptoms. No GI bleed  History of esophageal varies  Portal HTN     (+) GERD hepatitis type Other, liver disease,       Renal/Genitourinary:         Endo:     (+) type II DM .      Psychiatric:     (+) psychiatric history depression      Infectious Disease:         Malignancy:   (+) Malignancy History of GI  Hepaticellular carcinoma        Other:    - neg other ROS                     PHYSICAL EXAM:   Mental Status/Neuro: A/A/O   Airway: Facies: Feasible  Mallampati: I  Mouth/Opening: Full  TM distance: > 6 cm  Neck ROM: Full   Respiratory: Auscultation: CTAB     Resp. Rate: Normal     Resp. Effort: Normal      CV: Rhythm: Regular  Rate: Age appropriate  Heart: Normal Sounds  Edema: None   Comments:      Dental: Normal Dentition                LABS:  CBC:   Lab Results   Component Value Date    WBC 3.2 (L) 11/10/2019    WBC 3.1 (L) 10/23/2019    HGB 12.4 (L) 11/10/2019    HGB 12.6 (L) 10/23/2019    HCT 36.9 (L) 11/10/2019    HCT 37.7 (L) 10/23/2019    PLT 35 (LL) 11/10/2019    PLT 40 (LL) 10/23/2019     BMP:   Lab Results   Component Value Date     11/10/2019     10/23/2019    POTASSIUM 4.3 11/10/2019    POTASSIUM 4.0 10/23/2019    CHLORIDE 112 (H) 11/10/2019    CHLORIDE 112 (H) 10/23/2019    CO2 24 11/10/2019    CO2 23 10/23/2019    BUN 18 11/10/2019    BUN 25 10/23/2019    CR 1.18 11/10/2019    CR 1.09 10/23/2019     (H) 11/10/2019    GLC 96 10/23/2019     COAGS:   Lab Results   Component Value Date    PTT 32 11/10/2019    INR 1.31 (H) 11/10/2019    FIBR 219 11/10/2019     POC:   Lab Results   Component Value Date    BGM 87 11/10/2019     OTHER:   Lab Results   Component Value Date    A1C 6.6 (H) 08/08/2018    DEBORAH 9.3 11/10/2019    PHOS 2.5 11/10/2019    MAG 2.1 11/10/2019    ALBUMIN 2.8 (L) 11/10/2019    PROTTOTAL 7.2 11/10/2019    ALT 64 11/10/2019    AST 64 (H) 11/10/2019    ALKPHOS 180 (H) 11/10/2019    BILITOTAL 1.4 (H) 11/10/2019    AMYLASE 41 11/10/2019    JAMARI 28 01/24/2019    TSH 0.49 08/08/2018    T4 1.21 08/08/2018        Preop Vitals    BP  "Readings from Last 3 Encounters:   11/10/19 124/69   10/28/19 99/56   10/28/19 99/56    Pulse Readings from Last 3 Encounters:   11/10/19 65   10/28/19 63   10/28/19 63      Resp Readings from Last 3 Encounters:   11/10/19 16   10/28/19 14   10/01/19 16    SpO2 Readings from Last 3 Encounters:   11/10/19 99%   10/28/19 99%   10/28/19 99%      Temp Readings from Last 1 Encounters:   11/10/19 36.8  C (98.2  F) (Oral)    Ht Readings from Last 1 Encounters:   10/28/19 1.753 m (5' 9\")      Wt Readings from Last 1 Encounters:   11/10/19 79.4 kg (175 lb 1.6 oz)    Estimated body mass index is 25.86 kg/m  as calculated from the following:    Height as of 10/28/19: 1.753 m (5' 9\").    Weight as of this encounter: 79.4 kg (175 lb 1.6 oz).     LDA:  Peripheral IV 11/10/19 Left;Anterior Lower forearm (Active)   Number of days: 0       Right Radial Interventional Procedure Access (Active)   Site Assessment WDL 2/26/2019 12:15 PM   Hemostasis management Radial hemostasis device on patient 2/26/2019 12:15 PM   Radial device volume remaining 0 mL 2/26/2019 12:15 PM   CMS Right Arm WDL 2/26/2019 12:15 PM   Radial Pulse - Right Arm Normal 2/26/2019 12:15 PM   Number of days: 257        Assessment:   ASA SCORE: 4    H&P: History and physical reviewed and following examination; no interval change.   Smoking Status:  Non-Smoker/Unknown   NPO Status: NPO Appropriate     Plan:   Anes. Type:  General   Pre-Medication: None   Induction:  IV (RSI)   Airway: ETT; Oral   Access/Monitoring: PIV; 2nd PIV; A-Line; MAC-Line; PAC   Maintenance: Balanced     Blood products: FFP; Cell Saver; PRBC; PLT; Blood in Room     Drips/Meds: Norepi; Vasopressin; Epinephrine     Advanced Monitoring: NIRS (cerebral)     Postop Plan:   Postop Pain: Opioids; Long Acting Opioids  Postop Sedation/Airway: SECURED AIRWAY likely  Disposition: ICU     PONV Management:   Adult Risk Factors:, Non-Smoker, Postop Opioids   Prevention: Ondansetron     CONSENT: Direct " conversation   Plan and risks discussed with: Patient   Blood Products: Consented (ALL Blood Products)                   James Cobb DO

## 2019-11-11 NOTE — OP NOTE
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 Donation after Brain Death liver transplant   2. End-to-end Choledochocholedochostomy over an 8 pediatric feeding tube   3. Liver biopsy   4. Venovenous bypass       Surgeon:  Surgeon(s) and Role:     * Александр Ramos MD - Primary        Fellow/assistant:   * Nadeem Lyons MD - Fellow - Assisting     * Gonzalo Rodriguez MD - Fellow - Assisting There was no qualified resident to assist with this procedure    Anesthesia:  General    Specimen:   recipient liver and donor gallbladder    Drains:  LUANNE X2- right in right retrohepatic space, left in retroportal space    Urine output:  1500 mls    Estimated blood loss:  2000 ml     Intraoperative Events: none    Complications: None.    Findings: A replaced right hepatic artery was found in both recipient and donor. The donor aberrant right hepatic was anastomosed end to end to the celiac origin on the aortic cuff. 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 A suitable donor offer has become available. After discussing the risks and benefits of proceeding, the patient agreed to proceed with surgery and provided informed consent.    Final ABO/Crossmatch verification: After the donor organ arrived to the operating room and prior to anastomosis, I participated in the transplant pre-verification upon organ receipt timeout by visually verifying the donor ID, organ and laterality, donor blood type, recipient unique identifier, recipient blood  type, and that the donor and recipient are blood type compatible.     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 hepatocellular carcinoma    Postoperative diagnosis:  Same,     Surgeon:  Surgeon(s) and Role:     * Александр Ramos MD - Primary        Fellow/Assistant:    * Nadeem Lyons MD - Fellow - Assisting     * Gonzalo Rodriguez MD - Fellow - Assisting There was no qualified resident to assist with this procedure    Anesthesia:  None    Graft biopsy:  Yes-      Macroscopic steatosis:  Mild    Back table reconstruction:  Reconstruction    Intimal flap repair:  No    # of hepatic arteries:  2    # of portal veins:  1    Accessory arteries:  Yes    # of hepatic veins:      # of bile ducts:  1    Graft weight:       Findings:  Liver laceration:  Overall quality of liver:    Back Table Procedure: The liver allograft was received and inspected and the aforementioned findings were noted. It was flushed with preservation solution. The donor liver was placed in fresh ice-cold preservation solution. The inferior vena cava was identified. Two stay sutures were placed on the supra-hepatic portion of the cava. Two stay sutures were placed on the infra-hepatic portion of the cava. The fibro-fatty tissue and adrenal gland was cleared of inferior vena cava. The phrenic vein was ligated. The adrenal vein was ligated. The IVC was tested for leaks by  using a bulb syringe. All the leaks identified were suture ligated. The portal vein was identified. All the fibro-fatty area or tissue around the portal vein was removed and the portal vein was dissected up to its bifurcation. An 8-Japanese cannula was placed in the portal vein and fixed with a stitch. The portal vein was tested for leaks. All the leaks identified were suture ligated. The cannula was left in place to be used for flushing the liver at the time of implantation. The hepatic artery anatomy was identified. The celiac axis  was traced all the way from the aortic patch to the level of the gastro-duodenal artery. Dissection was stopped at the level of the gastro-duodenal artery. All the leaks in the hepatic artery tributaries were suture ligated. The bile duct was inspected and flushed. A replaced right hepatic artery was found in both recipient and donor. The donor aberrant right hepatic was anastomosed end to end to the celiac origin on the aortic cuff. The liver was placed back in ice-cold preservation solution until ready for transplantation. Faculty was present for the critical portions of the procedure.    Findings: A replaced right hepatic artery was found in both recipient and donor. The donor aberrant right hepatic was anastomosed end to end to the celiac origin on the aortic cuff. Subsequently an anastomosis between the donor splenic artery and the recipient aberrant right hepatic artery was performed    Operative Procedure:   Arterial anastomosis start:  11/11/2019 11:56 AM    Recipient arterial unclamp:  11/11/2019  1:07 PM    Extra vessels used:  no    Extra vessels banked:  no    Previous upper abd surgery?  No    Previous cholecystectomy?  No    Portal vein:  Thrombus? Yes-    Patent? Yes-          On portal bypass?  Yes-     Arterial flow:  Sufficient? Yes-         Bile duct anastomosis:  To bowel? No   Specify:    To duct? Yes-     Specify:      Frandy Workman was brought to the operating  room, placed in a supine position, and a time out was performed. Sequential compression devices were placed on both lower extremities and general endotracheal anesthesia was induced.  The patient was given IV antibiotics, and  Solumedrol. A Carroll catheter was placed. A central line was placed by Anesthesia service. The abdomen was then shaved, prepped, and draped in the usual sterile fashion.  The backtable preparation occurred prior to implantation.    We entered the peritoneal cavity and made a Vertical Extension (Ana) incision. Upon opening the abdomen, we visualized liver. The falciform ligament was divided. The left triangular ligament was divided, following by division of the gastrohepatic omentum. The right and left hepatic arteries were identified and divided, followed by identification and division of the bile duct. The abdomen was examined. There were adhesions to the gallbladder which were taken down with electrocautery. Coagulopathy and bleeding was encountered due to ESLD.    We cleared off all the fibrofatty areolar tissue to expose the portal vein. The portal vein was ligated at the hepatic hilum and we went on portal bypass. The right hepatic lobe was mobilized.     Next, we mobilized the right lobe of the liver, dividing small branches off the IVC. We also mobilized the caudate lobe. The middle and left hepatic veins were divided using the white load RANDY followed by division of the right hepatic vein. At this point, the remainder of the attachments were divided and the liver was passed off the operative field.     We then brought the donor liver into the field. We constructed the side-to-side anastomoses between the IVC of the recipient and the donor using 4-0 Prolene. Next, we removed the portal venous cannula. We freshened the edges of both donor and recipient portal veins and performed end-to-end anastomosis using continuous 6-0 Prolene with a growth factor.     We then released the clamps  and flushed the liver with 800 mL of blood followed by reclamping both jeff and IVC. The inferior portion of the donor IVC was ligated. Liver was reperfused and we started arterial anastomoses. We performed arterial anastomosis between donor common hepatic artery using Carrel patch and the bifurcation of the left and right recipient hepatic arteries.     The donor cholecystectomy was performed. Next, we freshened the edges of the donor bile duct and performed a duct-to-duct anastomosis using 6-0 PDS over pediatric feeding tube. Complete hemostasis was achieved and 2 Martin drains were placed in 1 under the right diaphragm and the other nearby biliary anastomosis.    The fascia was closed in 2 layers using 0 looped PDS followed by closure of the skin with the stapler. All needle, sponge, and instrument counts were accurate. The patient tolerated the procedure well without apparent complications and was transferred to the PACU in good condition. Faculty was present for critical portions of the procedure.    I was present during the key portions of the procedure, and I was immediately available for the entire procedure.

## 2019-11-11 NOTE — ANESTHESIA PROCEDURE NOTES
Central Line Procedure Note  Staff:     Anesthesiologist:  James Cobb DO  Location: In OR after induction  Procedure Start/Stop Times:     patient identified, IV checked, site marked, risks and benefits discussed, informed consent, monitors and equipment checked, pre-op evaluation and at physician/surgeon's request      Correct Patient: Yes      Correct Position: Yes      Correct Site: Yes      Correct Procedure: Yes      Correct Laterality:  Yes    Site Marked:  Yes  Line Placement:     Procedure:  Central Line    Insertion laterality:  Right    Insertion site:  Internal Jugular      Maximal Sterile Barriers: All elements of maximal sterile barrier technique followed      (Maximal sterile barriers include:   Sterile gown, Sterile Gloves, Mask, Cap, Whole body draped, hand hygiene and acceptable skin prep).Skin Prep: Chloraprep         Injection Technique:  Ultrasound guided    Sterile Ultrasound Technique:  Sterile probe cover and Sterile gel    Vein evaluated via U/S for patency/adequacy of catheter insertion and is adequate.  Using realtime U/S imaging the vein was punctured, and needle was observed entering vein on U/S      Permanent Image entered into patient's record      Local skin infiltration:  None    Catheter size:  9 Fr, 2 lumen 11.5 cm (MAC)    Cath secured with: suture      Dressing:  Tegaderm    Complications:  None obvious    Blood aspirated all lumens: Yes      All Lumens Flushed: Yes    Assessment/Narrative:      First MAC line placed distally compared to the other MAC line in the same internal jugular (right)

## 2019-11-11 NOTE — PLAN OF CARE
7A PT: Cancel, pt currently in OR for liver transplant. Will reschedule PT evaluation and follow-up post-operatively.

## 2019-11-11 NOTE — ANESTHESIA CARE TRANSFER NOTE
Patient: Frandy Workman    Procedure(s):  TRANSPLANT, LIVER, RECIPIENT,  DONOR    Diagnosis: * No pre-op diagnosis entered *  Diagnosis Additional Information: No value filed.    Anesthesia Type:   General     Note:  Airway :ETT and Ventilator  Patient transferred to:ICU  Comments: Pt tx'd to 4A. Ambu'd on 10L/min. VSS en route. Report to RN on arrival. Placed on vent.ICU Handoff: Call for PAUSE to initiate/utilize ICU HANDOFF, Identified Patient, Identified Responsible Provider, Reviewed the Pertinent Medical History, Discussed Surgical Course, Reviewed Intra-OP Anesthesia Management and Issues during Anesthesia, Set Expectations for Post Procedure Period and Allowed Opportunity for Questions and Acknowledgement of Understanding      Vitals: (Last set prior to Anesthesia Care Transfer)    [facility] VITALS  2019 1558 - 2019 1652      2019             ART BP:  96/63    Ht Rate:  78                Electronically Signed By: SHANIQUE Ramesh CRNA  2019  4:52 PM

## 2019-11-11 NOTE — ANESTHESIA PROCEDURE NOTES
Arterial Line Procedure Note  Staff:     Anesthesiologist:  James Cobb DO  Location: In OR After Induction  Procedure Start/Stop Times:     patient identified, IV checked, site marked, risks and benefits discussed, informed consent, monitors and equipment checked, pre-op evaluation and at physician/surgeon's request      Correct Patient: Yes      Correct Position: Yes      Correct Site: Yes      Correct Procedure: Yes      Correct Laterality:  Yes    Site Marked:  Yes  Line Placement:     Procedure:  Arterial Line    Insertion Site:  Radial    Insertion laterality:  Left    Skin Prep: Chloraprep      Patient Prep: mask, sterile gloves and hat      Local skin infiltration:  None    Ultrasound Guided?: Yes      Artery evaluated via ultrasound confirming patency.   Using realtime imaging, the artery was punctured and the needle was observed entering the artery.      A permanent image is entered into patient's chart.      Catheter size:  20 gauge, Quick cath    Cath secured with: other (comment)      Dressing:  Tegaderm    Complications:  None obvious    Arterial waveform: Yes      IBP within 10% of NIBP: Yes    Assessment/Narrative:      The first attempt was done in the same arm (Left radial) but unsuccessful, unable to tread catheter. Removed and press held

## 2019-11-11 NOTE — ANESTHESIA POSTPROCEDURE EVALUATION
Anesthesia POST Procedure Evaluation    Patient: Frandy Workman   MRN:     [id number] Gender:   male   Age:    54 year old :      1965        Preoperative Diagnosis: * No pre-op diagnosis entered *   Procedure(s):  TRANSPLANT, LIVER, RECIPIENT,  DONOR   Postop Comments: No value filed.       Anesthesia Type:  Not documented  General    Reportable Event: NO     PAIN: Uncomplicated   Sign Out status: Comfortable, Well controlled pain     PONV: No PONV   Sign Out status:  No Nausea or Vomiting     Neuro/Psych: Uneventful perioperative course   Sign Out Status: Preoperative baseline; Age appropriate mentation     Airway/Resp.: Uneventful perioperative course   Sign Out Status: Airway Device present     CV: Uneventful perioperative course   Sign Out status: Appropriate BP and perfusion indices; Appropriate HR/Rhythm     Disposition:   Sign Out in:  ICU  Disposition:  ICU  Recovery Course: Uneventful  Follow-Up: Not required     Comments/Narrative:  Patient transported to the ICU without any issues, report given to ICU nurses. Patient kept intubated           Last Anesthesia Record Vitals:  [facility] VITALS  2019 1558 - 2019 1636      2019             Resp Rate (set):  15          Last PACU Vitals:  No vitals data found for the desired time range.        Electronically Signed By: James Cobb DO, 2019, 4:36 PM

## 2019-11-11 NOTE — ANESTHESIA PROCEDURE NOTES
Central Line Procedure Note  Staff:     Anesthesiologist:  James Cobb DO  Location: In OR after induction  Procedure Start/Stop Times:     patient identified, IV checked, site marked, risks and benefits discussed, informed consent, monitors and equipment checked, pre-op evaluation and at physician/surgeon's request      Correct Patient: Yes      Correct Position: Yes      Correct Site: Yes      Correct Procedure: Yes      Correct Laterality:  Yes    Site Marked:  Yes  Line Placement:     Procedure:  Central Line    Insertion laterality:  Right    Insertion site:  Internal Jugular      Maximal Sterile Barriers: All elements of maximal sterile barrier technique followed      (Maximal sterile barriers include:   Sterile gown, Sterile Gloves, Mask, Cap, Whole body draped, hand hygiene and acceptable skin prep).Skin Prep: Chloraprep         Injection Technique:  Ultrasound guided    Sterile Ultrasound Technique:  Sterile probe cover and Sterile gel    Vein evaluated via U/S for patency/adequacy of catheter insertion and is adequate.  Using realtime U/S imaging the vein was punctured, and needle was observed entering vein on U/S      Permanent Image entered into patient's record      Local skin infiltration:  None    Catheter size:  9 Fr, 2 lumen 11.5 cm (MAC)    Cath secured with: suture      Dressing:  Tegaderm, Occlusive dressing and Biopatch    Complications:  None obvious    Blood aspirated all lumens: Yes      All Lumens Flushed: Yes    Assessment/Narrative:      This is the 2nd MAC placed more proximal. Both wired were visulized using US and comfirmed in the right IJ

## 2019-11-12 ENCOUNTER — ANESTHESIA (OUTPATIENT)
Dept: SURGERY | Facility: CLINIC | Age: 54
DRG: 005 | End: 2019-11-12
Payer: MEDICARE

## 2019-11-12 ENCOUNTER — APPOINTMENT (OUTPATIENT)
Dept: GENERAL RADIOLOGY | Facility: CLINIC | Age: 54
DRG: 005 | End: 2019-11-12
Attending: SURGERY
Payer: MEDICARE

## 2019-11-12 ENCOUNTER — ANESTHESIA EVENT (OUTPATIENT)
Dept: SURGERY | Facility: CLINIC | Age: 54
DRG: 005 | End: 2019-11-12
Payer: MEDICARE

## 2019-11-12 ENCOUNTER — APPOINTMENT (OUTPATIENT)
Dept: ULTRASOUND IMAGING | Facility: CLINIC | Age: 54
DRG: 005 | End: 2019-11-12
Attending: SURGERY
Payer: MEDICARE

## 2019-11-12 ENCOUNTER — DOCUMENTATION ONLY (OUTPATIENT)
Dept: TRANSPLANT | Facility: CLINIC | Age: 54
End: 2019-11-12

## 2019-11-12 DIAGNOSIS — E11.3313 TYPE 2 DIABETES MELLITUS WITH MODERATE NONPROLIFERATIVE RETINOPATHY OF BOTH EYES AND MACULAR EDEMA, UNSPECIFIED WHETHER LONG TERM INSULIN USE (H): Primary | ICD-10-CM

## 2019-11-12 LAB
ALBUMIN SERPL-MCNC: 2.4 G/DL (ref 3.4–5)
ALP SERPL-CCNC: 64 U/L (ref 40–150)
ALT SERPL W P-5'-P-CCNC: 111 U/L (ref 0–70)
AMYLASE SERPL-CCNC: 26 U/L (ref 30–110)
ANGLE RATE OF CLOT GROWTH: 39.9 DEG (ref 59–74)
ANGLE RATE OF CLOT STRENGTH: 37 DEGREES (ref 53–72)
ANION GAP SERPL CALCULATED.3IONS-SCNC: 5 MMOL/L (ref 3–14)
ANION GAP SERPL CALCULATED.3IONS-SCNC: 6 MMOL/L (ref 3–14)
ANISOCYTOSIS BLD QL SMEAR: SLIGHT
APTT PPP: 39 SEC (ref 22–37)
APTT PPP: 57 SEC (ref 22–37)
AST SERPL W P-5'-P-CCNC: 222 U/L (ref 0–45)
BASE DEFICIT BLDA-SCNC: 1 MMOL/L
BASE DEFICIT BLDA-SCNC: 1.3 MMOL/L
BASOPHILS # BLD AUTO: 0 10E9/L (ref 0–0.2)
BASOPHILS # BLD AUTO: 0 10E9/L (ref 0–0.2)
BASOPHILS NFR BLD AUTO: 0 %
BASOPHILS NFR BLD AUTO: 0.2 %
BILIRUB DIRECT SERPL-MCNC: 0.9 MG/DL (ref 0–0.2)
BILIRUB SERPL-MCNC: 1.8 MG/DL (ref 0.2–1.3)
BLD PROD TYP BPU: NORMAL
BLD UNIT ID BPU: 0
BLOOD PRODUCT CODE: NORMAL
BPU ID: NORMAL
BUN SERPL-MCNC: 24 MG/DL (ref 7–30)
BUN SERPL-MCNC: 29 MG/DL (ref 7–30)
CA-I BLD-MCNC: 4.8 MG/DL (ref 4.4–5.2)
CA-I BLD-MCNC: 4.9 MG/DL (ref 4.4–5.2)
CA-I BLD-MCNC: 5.1 MG/DL (ref 4.4–5.2)
CALCIUM SERPL-MCNC: 8.2 MG/DL (ref 8.5–10.1)
CALCIUM SERPL-MCNC: 9.1 MG/DL (ref 8.5–10.1)
CHLORIDE SERPL-SCNC: 114 MMOL/L (ref 94–109)
CHLORIDE SERPL-SCNC: 114 MMOL/L (ref 94–109)
CI HYPOCOAGULATION INDEX: 7.3 RATIO (ref 0–3)
CI HYPOCOAGULATION INDEX: 8.4 RATIO (ref 0–3)
CLOT LYSIS 30M P MA LENFR BLD TEG: 0 % (ref 0–8)
CLOT STRENGTH BLD TEG: 3.1 KD/SC (ref 5.3–13.2)
CO2 SERPL-SCNC: 25 MMOL/L (ref 20–32)
CO2 SERPL-SCNC: 26 MMOL/L (ref 20–32)
CREAT SERPL-MCNC: 1.22 MG/DL (ref 0.66–1.25)
CREAT SERPL-MCNC: 1.25 MG/DL (ref 0.66–1.25)
DIFFERENTIAL METHOD BLD: ABNORMAL
DIFFERENTIAL METHOD BLD: ABNORMAL
EOSINOPHIL # BLD AUTO: 0 10E9/L (ref 0–0.7)
EOSINOPHIL # BLD AUTO: 0 10E9/L (ref 0–0.7)
EOSINOPHIL NFR BLD AUTO: 0 %
EOSINOPHIL NFR BLD AUTO: 0 %
ERYTHROCYTE [DISTWIDTH] IN BLOOD BY AUTOMATED COUNT: 15 % (ref 10–15)
ERYTHROCYTE [DISTWIDTH] IN BLOOD BY AUTOMATED COUNT: 15.3 % (ref 10–15)
ERYTHROCYTE [DISTWIDTH] IN BLOOD BY AUTOMATED COUNT: 15.5 % (ref 10–15)
ERYTHROCYTE [DISTWIDTH] IN BLOOD BY AUTOMATED COUNT: 16.6 % (ref 10–15)
ERYTHROCYTE [DISTWIDTH] IN BLOOD BY AUTOMATED COUNT: 16.7 % (ref 10–15)
ERYTHROCYTE [DISTWIDTH] IN BLOOD BY AUTOMATED COUNT: 17.2 % (ref 10–15)
ERYTHROCYTE [DISTWIDTH] IN BLOOD BY AUTOMATED COUNT: 17.4 % (ref 10–15)
FIBRINOGEN PPP-MCNC: 157 MG/DL (ref 200–420)
FIBRINOGEN PPP-MCNC: 159 MG/DL (ref 200–420)
FIBRINOGEN PPP-MCNC: 187 MG/DL (ref 200–420)
FIBRINOGEN PPP-MCNC: 190 MG/DL (ref 200–420)
FIBRINOGEN PPP-MCNC: 204 MG/DL (ref 200–420)
FIBRINOGEN PPP-MCNC: 219 MG/DL (ref 200–420)
G ACTUAL CLOT STRENGTH: 3.1 KD/SC (ref 4.5–11)
GFR SERPL CREATININE-BSD FRML MDRD: 64 ML/MIN/{1.73_M2}
GFR SERPL CREATININE-BSD FRML MDRD: 66 ML/MIN/{1.73_M2}
GLUCOSE BLD-MCNC: 147 MG/DL (ref 70–99)
GLUCOSE BLD-MCNC: 156 MG/DL (ref 70–99)
GLUCOSE BLDC GLUCOMTR-MCNC: 105 MG/DL (ref 70–99)
GLUCOSE BLDC GLUCOMTR-MCNC: 112 MG/DL (ref 70–99)
GLUCOSE BLDC GLUCOMTR-MCNC: 114 MG/DL (ref 70–99)
GLUCOSE BLDC GLUCOMTR-MCNC: 114 MG/DL (ref 70–99)
GLUCOSE BLDC GLUCOMTR-MCNC: 117 MG/DL (ref 70–99)
GLUCOSE BLDC GLUCOMTR-MCNC: 118 MG/DL (ref 70–99)
GLUCOSE BLDC GLUCOMTR-MCNC: 121 MG/DL (ref 70–99)
GLUCOSE BLDC GLUCOMTR-MCNC: 123 MG/DL (ref 70–99)
GLUCOSE BLDC GLUCOMTR-MCNC: 127 MG/DL (ref 70–99)
GLUCOSE BLDC GLUCOMTR-MCNC: 127 MG/DL (ref 70–99)
GLUCOSE BLDC GLUCOMTR-MCNC: 128 MG/DL (ref 70–99)
GLUCOSE BLDC GLUCOMTR-MCNC: 141 MG/DL (ref 70–99)
GLUCOSE BLDC GLUCOMTR-MCNC: 144 MG/DL (ref 70–99)
GLUCOSE BLDC GLUCOMTR-MCNC: 148 MG/DL (ref 70–99)
GLUCOSE BLDC GLUCOMTR-MCNC: 150 MG/DL (ref 70–99)
GLUCOSE BLDC GLUCOMTR-MCNC: 151 MG/DL (ref 70–99)
GLUCOSE BLDC GLUCOMTR-MCNC: 152 MG/DL (ref 70–99)
GLUCOSE BLDC GLUCOMTR-MCNC: 181 MG/DL (ref 70–99)
GLUCOSE BLDC GLUCOMTR-MCNC: 192 MG/DL (ref 70–99)
GLUCOSE SERPL-MCNC: 123 MG/DL (ref 70–99)
GLUCOSE SERPL-MCNC: 157 MG/DL (ref 70–99)
HCO3 BLD-SCNC: 24 MMOL/L (ref 21–28)
HCO3 BLD-SCNC: 24 MMOL/L (ref 21–28)
HCT VFR BLD AUTO: 16.1 % (ref 40–53)
HCT VFR BLD AUTO: 16.6 % (ref 40–53)
HCT VFR BLD AUTO: 19.2 % (ref 40–53)
HCT VFR BLD AUTO: 20 % (ref 40–53)
HCT VFR BLD AUTO: 22.2 % (ref 40–53)
HCT VFR BLD AUTO: 25.6 % (ref 40–53)
HCT VFR BLD AUTO: 26 % (ref 40–53)
HGB BLD-MCNC: 5.6 G/DL (ref 13.3–17.7)
HGB BLD-MCNC: 5.7 G/DL (ref 13.3–17.7)
HGB BLD-MCNC: 6.8 G/DL (ref 13.3–17.7)
HGB BLD-MCNC: 6.9 G/DL (ref 13.3–17.7)
HGB BLD-MCNC: 7.2 G/DL (ref 13.3–17.7)
HGB BLD-MCNC: 7.9 G/DL (ref 13.3–17.7)
HGB BLD-MCNC: 8.8 G/DL (ref 13.3–17.7)
HGB BLD-MCNC: 9 G/DL (ref 13.3–17.7)
HGB BLD-MCNC: 9 G/DL (ref 13.3–17.7)
HYPOCHROMIA BLD QL: PRESENT
IMM GRANULOCYTES # BLD: 0.1 10E9/L (ref 0–0.4)
IMM GRANULOCYTES NFR BLD: 0.5 %
INR PPP: 1.32 (ref 0.86–1.14)
INR PPP: 1.36 (ref 0.86–1.14)
INR PPP: 1.38 (ref 0.86–1.14)
INR PPP: 1.46 (ref 0.86–1.14)
INR PPP: 1.46 (ref 0.86–1.14)
INR PPP: 1.47 (ref 0.86–1.14)
K TIME TO SPEC CLOT STRENGTH: 5.1 MIN (ref 1–3)
K TIME TO SPEC CLOT STRENGTH: 5.8 MINUTE (ref 1–3)
LACTATE BLD-SCNC: 1 MMOL/L (ref 0.7–2)
LIPASE SERPL-CCNC: 32 U/L (ref 73–393)
LY30 LYSIS AT 30 MINUTES: 0 % (ref 0–8)
LY60 LYSIS AT 60 MINUTES: 0 % (ref 0–15)
LY60 LYSIS AT 60 MINUTES: 0 % (ref 0–15)
LYMPHOCYTES # BLD AUTO: 0.1 10E9/L (ref 0.8–5.3)
LYMPHOCYTES # BLD AUTO: 1 10E9/L (ref 0.8–5.3)
LYMPHOCYTES NFR BLD AUTO: 10.1 %
LYMPHOCYTES NFR BLD AUTO: 6.1 %
MA MAXIMUM CLOT STRENGTH: 38 MM (ref 50–70)
MA MAXIMUM CLOT STRENGTH: 38.1 MM (ref 55–74)
MAGNESIUM SERPL-MCNC: 2.2 MG/DL (ref 1.6–2.3)
MCH RBC QN AUTO: 31.3 PG (ref 26.5–33)
MCH RBC QN AUTO: 31.5 PG (ref 26.5–33)
MCH RBC QN AUTO: 31.6 PG (ref 26.5–33)
MCH RBC QN AUTO: 32.1 PG (ref 26.5–33)
MCH RBC QN AUTO: 32.4 PG (ref 26.5–33)
MCHC RBC AUTO-ENTMCNC: 34 G/DL (ref 31.5–36.5)
MCHC RBC AUTO-ENTMCNC: 34.3 G/DL (ref 31.5–36.5)
MCHC RBC AUTO-ENTMCNC: 34.4 G/DL (ref 31.5–36.5)
MCHC RBC AUTO-ENTMCNC: 34.6 G/DL (ref 31.5–36.5)
MCHC RBC AUTO-ENTMCNC: 34.8 G/DL (ref 31.5–36.5)
MCHC RBC AUTO-ENTMCNC: 35.6 G/DL (ref 31.5–36.5)
MCHC RBC AUTO-ENTMCNC: 35.9 G/DL (ref 31.5–36.5)
MCV RBC AUTO: 89 FL (ref 78–100)
MCV RBC AUTO: 90 FL (ref 78–100)
MCV RBC AUTO: 91 FL (ref 78–100)
MCV RBC AUTO: 93 FL (ref 78–100)
MONOCYTES # BLD AUTO: 0 10E9/L (ref 0–1.3)
MONOCYTES # BLD AUTO: 0.4 10E9/L (ref 0–1.3)
MONOCYTES NFR BLD AUTO: 0.9 %
MONOCYTES NFR BLD AUTO: 4.3 %
NEUTROPHILS # BLD AUTO: 2.1 10E9/L (ref 1.6–8.3)
NEUTROPHILS # BLD AUTO: 8.2 10E9/L (ref 1.6–8.3)
NEUTROPHILS NFR BLD AUTO: 84.9 %
NEUTROPHILS NFR BLD AUTO: 93 %
NRBC # BLD AUTO: 0 10*3/UL
NRBC BLD AUTO-RTO: 0 /100
NUM BPU REQUESTED: 1
NUM BPU REQUESTED: 1
NUM BPU REQUESTED: 3
NUM BPU REQUESTED: 4
O2/TOTAL GAS SETTING VFR VENT: ABNORMAL %
O2/TOTAL GAS SETTING VFR VENT: ABNORMAL %
PCO2 BLD: 42 MM HG (ref 35–45)
PCO2 BLD: 43 MM HG (ref 35–45)
PH BLD: 7.36 PH (ref 7.35–7.45)
PH BLD: 7.37 PH (ref 7.35–7.45)
PHOSPHATE SERPL-MCNC: 3.5 MG/DL (ref 2.5–4.5)
PLATELET # BLD AUTO: 14 10E9/L (ref 150–450)
PLATELET # BLD AUTO: 19 10E9/L (ref 150–450)
PLATELET # BLD AUTO: 20 10E9/L (ref 150–450)
PLATELET # BLD AUTO: 24 10E9/L (ref 150–450)
PLATELET # BLD AUTO: 26 10E9/L (ref 150–450)
PLATELET # BLD AUTO: 26 10E9/L (ref 150–450)
PLATELET # BLD AUTO: 55 10E9/L (ref 150–450)
PLATELET # BLD AUTO: 64 10E9/L (ref 150–450)
PLATELET # BLD EST: ABNORMAL 10*3/UL
PO2 BLD: 114 MM HG (ref 80–105)
PO2 BLD: 92 MM HG (ref 80–105)
POIKILOCYTOSIS BLD QL SMEAR: SLIGHT
POLYCHROMASIA BLD QL SMEAR: SLIGHT
POTASSIUM BLD-SCNC: 4.2 MMOL/L (ref 3.4–5.3)
POTASSIUM BLD-SCNC: 4.2 MMOL/L (ref 3.4–5.3)
POTASSIUM SERPL-SCNC: 3.8 MMOL/L (ref 3.4–5.3)
POTASSIUM SERPL-SCNC: 3.9 MMOL/L (ref 3.4–5.3)
PROT SERPL-MCNC: 4.6 G/DL (ref 6.8–8.8)
R TIME UNTIL CLOT FORMS: 7.2 MINUTE (ref 5–10)
R TIME UNTIL CLOT FORMS: 9.8 MIN (ref 4–9)
RBC # BLD AUTO: 1.79 10E12/L (ref 4.4–5.9)
RBC # BLD AUTO: 1.82 10E12/L (ref 4.4–5.9)
RBC # BLD AUTO: 2.15 10E12/L (ref 4.4–5.9)
RBC # BLD AUTO: 2.15 10E12/L (ref 4.4–5.9)
RBC # BLD AUTO: 2.44 10E12/L (ref 4.4–5.9)
RBC # BLD AUTO: 2.81 10E12/L (ref 4.4–5.9)
RBC # BLD AUTO: 2.86 10E12/L (ref 4.4–5.9)
SODIUM BLD-SCNC: 144 MMOL/L (ref 133–144)
SODIUM BLD-SCNC: 145 MMOL/L (ref 133–144)
SODIUM SERPL-SCNC: 145 MMOL/L (ref 133–144)
SODIUM SERPL-SCNC: 145 MMOL/L (ref 133–144)
TRANSFUSION STATUS PATIENT QL: NORMAL
WBC # BLD AUTO: 1.3 10E9/L (ref 4–11)
WBC # BLD AUTO: 2 10E9/L (ref 4–11)
WBC # BLD AUTO: 2.3 10E9/L (ref 4–11)
WBC # BLD AUTO: 2.6 10E9/L (ref 4–11)
WBC # BLD AUTO: 3.2 10E9/L (ref 4–11)
WBC # BLD AUTO: 9.4 10E9/L (ref 4–11)
WBC # BLD AUTO: 9.7 10E9/L (ref 4–11)

## 2019-11-12 PROCEDURE — 40000344 ZZHCL STATISTIC THAWING COMPONENT: Performed by: STUDENT IN AN ORGANIZED HEALTH CARE EDUCATION/TRAINING PROGRAM

## 2019-11-12 PROCEDURE — 85027 COMPLETE CBC AUTOMATED: CPT | Performed by: SURGERY

## 2019-11-12 PROCEDURE — 25000125 ZZHC RX 250: Performed by: NURSE ANESTHETIST, CERTIFIED REGISTERED

## 2019-11-12 PROCEDURE — 25000128 H RX IP 250 OP 636: Performed by: STUDENT IN AN ORGANIZED HEALTH CARE EDUCATION/TRAINING PROGRAM

## 2019-11-12 PROCEDURE — 84100 ASSAY OF PHOSPHORUS: CPT | Performed by: SURGERY

## 2019-11-12 PROCEDURE — 71000016 ZZH RECOVERY PHASE 1 LEVEL 3 FIRST HR: Performed by: SURGERY

## 2019-11-12 PROCEDURE — 86022 PLATELET ANTIBODIES: CPT | Performed by: STUDENT IN AN ORGANIZED HEALTH CARE EDUCATION/TRAINING PROGRAM

## 2019-11-12 PROCEDURE — 85610 PROTHROMBIN TIME: CPT | Performed by: SURGERY

## 2019-11-12 PROCEDURE — 40000014 ZZH STATISTIC ARTERIAL MONITORING DAILY

## 2019-11-12 PROCEDURE — 76700 US EXAM ABDOM COMPLETE: CPT

## 2019-11-12 PROCEDURE — 25000132 ZZH RX MED GY IP 250 OP 250 PS 637: Mod: GY | Performed by: STUDENT IN AN ORGANIZED HEALTH CARE EDUCATION/TRAINING PROGRAM

## 2019-11-12 PROCEDURE — P9041 ALBUMIN (HUMAN),5%, 50ML: HCPCS | Performed by: STUDENT IN AN ORGANIZED HEALTH CARE EDUCATION/TRAINING PROGRAM

## 2019-11-12 PROCEDURE — P9059 PLASMA, FRZ BETWEEN 8-24HOUR: HCPCS | Performed by: SURGERY

## 2019-11-12 PROCEDURE — 40000275 ZZH STATISTIC RCP TIME EA 10 MIN

## 2019-11-12 PROCEDURE — 82330 ASSAY OF CALCIUM: CPT | Performed by: SURGERY

## 2019-11-12 PROCEDURE — 82150 ASSAY OF AMYLASE: CPT | Performed by: SURGERY

## 2019-11-12 PROCEDURE — 25800029 ZZH RX IP 258 OP 250: Performed by: SURGERY

## 2019-11-12 PROCEDURE — P9012 CRYOPRECIPITATE EACH UNIT: HCPCS | Performed by: STUDENT IN AN ORGANIZED HEALTH CARE EDUCATION/TRAINING PROGRAM

## 2019-11-12 PROCEDURE — 44500 INTRO GASTROINTESTINAL TUBE: CPT

## 2019-11-12 PROCEDURE — 40000986 XR ABDOMEN PORT 1 VW

## 2019-11-12 PROCEDURE — 25000132 ZZH RX MED GY IP 250 OP 250 PS 637: Mod: GY | Performed by: SURGERY

## 2019-11-12 PROCEDURE — 20000004 ZZH R&B ICU UMMC

## 2019-11-12 PROCEDURE — 3E0G76Z INTRODUCTION OF NUTRITIONAL SUBSTANCE INTO UPPER GI, VIA NATURAL OR ARTIFICIAL OPENING: ICD-10-PCS

## 2019-11-12 PROCEDURE — 00000146 ZZHCL STATISTIC GLUCOSE BY METER IP

## 2019-11-12 PROCEDURE — P9016 RBC LEUKOCYTES REDUCED: HCPCS | Performed by: STUDENT IN AN ORGANIZED HEALTH CARE EDUCATION/TRAINING PROGRAM

## 2019-11-12 PROCEDURE — 83605 ASSAY OF LACTIC ACID: CPT | Performed by: SURGERY

## 2019-11-12 PROCEDURE — 82947 ASSAY GLUCOSE BLOOD QUANT: CPT | Performed by: SURGERY

## 2019-11-12 PROCEDURE — P9047 ALBUMIN (HUMAN), 25%, 50ML: HCPCS | Performed by: SURGERY

## 2019-11-12 PROCEDURE — 27210995 ZZH RX 272: Performed by: SURGERY

## 2019-11-12 PROCEDURE — 40000048 ZZH STATISTIC DAILY SWAN MONITORING

## 2019-11-12 PROCEDURE — 37000008 ZZH ANESTHESIA TECHNICAL FEE, 1ST 30 MIN: Performed by: SURGERY

## 2019-11-12 PROCEDURE — 25800030 ZZH RX IP 258 OP 636: Performed by: NURSE ANESTHETIST, CERTIFIED REGISTERED

## 2019-11-12 PROCEDURE — 25800030 ZZH RX IP 258 OP 636: Performed by: STUDENT IN AN ORGANIZED HEALTH CARE EDUCATION/TRAINING PROGRAM

## 2019-11-12 PROCEDURE — 85049 AUTOMATED PLATELET COUNT: CPT | Performed by: SURGERY

## 2019-11-12 PROCEDURE — 25000566 ZZH SEVOFLURANE, EA 15 MIN: Performed by: SURGERY

## 2019-11-12 PROCEDURE — 85396 CLOTTING ASSAY WHOLE BLOOD: CPT | Performed by: STUDENT IN AN ORGANIZED HEALTH CARE EDUCATION/TRAINING PROGRAM

## 2019-11-12 PROCEDURE — 25000125 ZZHC RX 250: Performed by: STUDENT IN AN ORGANIZED HEALTH CARE EDUCATION/TRAINING PROGRAM

## 2019-11-12 PROCEDURE — 84295 ASSAY OF SERUM SODIUM: CPT | Performed by: SURGERY

## 2019-11-12 PROCEDURE — 99291 CRITICAL CARE FIRST HOUR: CPT | Mod: GC | Performed by: SURGERY

## 2019-11-12 PROCEDURE — 94003 VENT MGMT INPAT SUBQ DAY: CPT

## 2019-11-12 PROCEDURE — 84132 ASSAY OF SERUM POTASSIUM: CPT | Performed by: SURGERY

## 2019-11-12 PROCEDURE — P9037 PLATE PHERES LEUKOREDU IRRAD: HCPCS | Performed by: STUDENT IN AN ORGANIZED HEALTH CARE EDUCATION/TRAINING PROGRAM

## 2019-11-12 PROCEDURE — 25000125 ZZHC RX 250

## 2019-11-12 PROCEDURE — 83690 ASSAY OF LIPASE: CPT | Performed by: SURGERY

## 2019-11-12 PROCEDURE — 25000128 H RX IP 250 OP 636: Performed by: NURSE ANESTHETIST, CERTIFIED REGISTERED

## 2019-11-12 PROCEDURE — 80048 BASIC METABOLIC PNL TOTAL CA: CPT | Performed by: SURGERY

## 2019-11-12 PROCEDURE — 85384 FIBRINOGEN ACTIVITY: CPT

## 2019-11-12 PROCEDURE — 36000070 ZZH SURGERY LEVEL 5 EA 15 ADDTL MIN - UMMC: Performed by: SURGERY

## 2019-11-12 PROCEDURE — 25000128 H RX IP 250 OP 636: Performed by: PHYSICIAN ASSISTANT

## 2019-11-12 PROCEDURE — 85025 COMPLETE CBC W/AUTO DIFF WBC: CPT | Performed by: SURGERY

## 2019-11-12 PROCEDURE — 25000128 H RX IP 250 OP 636: Performed by: ANESTHESIOLOGY

## 2019-11-12 PROCEDURE — 83735 ASSAY OF MAGNESIUM: CPT | Performed by: SURGERY

## 2019-11-12 PROCEDURE — 3E033XZ INTRODUCTION OF VASOPRESSOR INTO PERIPHERAL VEIN, PERCUTANEOUS APPROACH: ICD-10-PCS | Performed by: TRANSPLANT SURGERY

## 2019-11-12 PROCEDURE — 80076 HEPATIC FUNCTION PANEL: CPT | Performed by: SURGERY

## 2019-11-12 PROCEDURE — 85730 THROMBOPLASTIN TIME PARTIAL: CPT | Performed by: SURGERY

## 2019-11-12 PROCEDURE — 25800030 ZZH RX IP 258 OP 636: Performed by: SURGERY

## 2019-11-12 PROCEDURE — 85610 PROTHROMBIN TIME: CPT | Performed by: PHYSICIAN ASSISTANT

## 2019-11-12 PROCEDURE — 40000344 ZZHCL STATISTIC THAWING COMPONENT: Performed by: SURGERY

## 2019-11-12 PROCEDURE — 85396 CLOTTING ASSAY WHOLE BLOOD: CPT | Performed by: SURGERY

## 2019-11-12 PROCEDURE — 85384 FIBRINOGEN ACTIVITY: CPT | Performed by: SURGERY

## 2019-11-12 PROCEDURE — 71000017 ZZH RECOVERY PHASE 1 LEVEL 3 EA ADDTL HR: Performed by: SURGERY

## 2019-11-12 PROCEDURE — 85027 COMPLETE CBC AUTOMATED: CPT

## 2019-11-12 PROCEDURE — 25000128 H RX IP 250 OP 636: Performed by: SURGERY

## 2019-11-12 PROCEDURE — 37000009 ZZH ANESTHESIA TECHNICAL FEE, EACH ADDTL 15 MIN: Performed by: SURGERY

## 2019-11-12 PROCEDURE — 36000068 ZZH SURGERY LEVEL 5 1ST 30 MIN - UMMC: Performed by: SURGERY

## 2019-11-12 PROCEDURE — 25000125 ZZHC RX 250: Performed by: SURGERY

## 2019-11-12 PROCEDURE — 85384 FIBRINOGEN ACTIVITY: CPT | Performed by: PHYSICIAN ASSISTANT

## 2019-11-12 PROCEDURE — 27210794 ZZH OR GENERAL SUPPLY STERILE: Performed by: SURGERY

## 2019-11-12 PROCEDURE — 82803 BLOOD GASES ANY COMBINATION: CPT | Performed by: SURGERY

## 2019-11-12 PROCEDURE — 25000131 ZZH RX MED GY IP 250 OP 636 PS 637: Mod: GY | Performed by: SURGERY

## 2019-11-12 PROCEDURE — 0DCW0ZZ EXTIRPATION OF MATTER FROM PERITONEUM, OPEN APPROACH: ICD-10-PCS | Performed by: SURGERY

## 2019-11-12 RX ORDER — FENTANYL CITRATE 50 UG/ML
INJECTION, SOLUTION INTRAMUSCULAR; INTRAVENOUS PRN
Status: DISCONTINUED | OUTPATIENT
Start: 2019-11-12 | End: 2019-11-12

## 2019-11-12 RX ORDER — HYDRALAZINE HYDROCHLORIDE 20 MG/ML
10 INJECTION INTRAMUSCULAR; INTRAVENOUS EVERY 4 HOURS PRN
Status: DISCONTINUED | OUTPATIENT
Start: 2019-11-12 | End: 2019-11-12 | Stop reason: HOSPADM

## 2019-11-12 RX ORDER — ONDANSETRON 2 MG/ML
INJECTION INTRAMUSCULAR; INTRAVENOUS PRN
Status: DISCONTINUED | OUTPATIENT
Start: 2019-11-12 | End: 2019-11-12

## 2019-11-12 RX ORDER — METOCLOPRAMIDE HYDROCHLORIDE 5 MG/ML
5 INJECTION INTRAMUSCULAR; INTRAVENOUS ONCE
Status: COMPLETED | OUTPATIENT
Start: 2019-11-12 | End: 2019-11-12

## 2019-11-12 RX ORDER — NALOXONE HYDROCHLORIDE 0.4 MG/ML
.1-.4 INJECTION, SOLUTION INTRAMUSCULAR; INTRAVENOUS; SUBCUTANEOUS
Status: DISCONTINUED | OUTPATIENT
Start: 2019-11-12 | End: 2019-11-12

## 2019-11-12 RX ORDER — ALBUMIN, HUMAN INJ 5% 5 %
12.5 SOLUTION INTRAVENOUS ONCE
Status: COMPLETED | OUTPATIENT
Start: 2019-11-12 | End: 2019-11-12

## 2019-11-12 RX ORDER — SODIUM CHLORIDE, SODIUM GLUCONATE, SODIUM ACETATE, POTASSIUM CHLORIDE AND MAGNESIUM CHLORIDE 526; 502; 368; 37; 30 MG/100ML; MG/100ML; MG/100ML; MG/100ML; MG/100ML
INJECTION, SOLUTION INTRAVENOUS CONTINUOUS PRN
Status: DISCONTINUED | OUTPATIENT
Start: 2019-11-12 | End: 2019-11-12

## 2019-11-12 RX ORDER — HYDROMORPHONE HYDROCHLORIDE 1 MG/ML
.3-.5 INJECTION, SOLUTION INTRAMUSCULAR; INTRAVENOUS; SUBCUTANEOUS EVERY 5 MIN PRN
Status: DISCONTINUED | OUTPATIENT
Start: 2019-11-12 | End: 2019-11-12 | Stop reason: HOSPADM

## 2019-11-12 RX ORDER — FENTANYL CITRATE 50 UG/ML
25-50 INJECTION, SOLUTION INTRAMUSCULAR; INTRAVENOUS
Status: DISCONTINUED | OUTPATIENT
Start: 2019-11-12 | End: 2019-11-12 | Stop reason: HOSPADM

## 2019-11-12 RX ORDER — SODIUM CHLORIDE 9 MG/ML
INJECTION, SOLUTION INTRAVENOUS CONTINUOUS PRN
Status: DISCONTINUED | OUTPATIENT
Start: 2019-11-12 | End: 2019-11-12

## 2019-11-12 RX ORDER — NYSTATIN 100000/ML
10 SUSPENSION, ORAL (FINAL DOSE FORM) ORAL 4 TIMES DAILY
Status: DISCONTINUED | OUTPATIENT
Start: 2019-11-12 | End: 2019-11-18

## 2019-11-12 RX ORDER — HYDRALAZINE HYDROCHLORIDE 20 MG/ML
10-20 INJECTION INTRAMUSCULAR; INTRAVENOUS EVERY 6 HOURS PRN
Status: DISCONTINUED | OUTPATIENT
Start: 2019-11-12 | End: 2019-11-17

## 2019-11-12 RX ORDER — LIDOCAINE HYDROCHLORIDE 20 MG/ML
SOLUTION OROPHARYNGEAL
Status: COMPLETED
Start: 2019-11-12 | End: 2019-11-12

## 2019-11-12 RX ORDER — ONDANSETRON 2 MG/ML
4 INJECTION INTRAMUSCULAR; INTRAVENOUS EVERY 30 MIN PRN
Status: DISCONTINUED | OUTPATIENT
Start: 2019-11-12 | End: 2019-11-12 | Stop reason: HOSPADM

## 2019-11-12 RX ORDER — SODIUM CHLORIDE, SODIUM LACTATE, POTASSIUM CHLORIDE, CALCIUM CHLORIDE 600; 310; 30; 20 MG/100ML; MG/100ML; MG/100ML; MG/100ML
INJECTION, SOLUTION INTRAVENOUS CONTINUOUS
Status: DISCONTINUED | OUTPATIENT
Start: 2019-11-12 | End: 2019-11-12 | Stop reason: HOSPADM

## 2019-11-12 RX ORDER — LABETALOL 20 MG/4 ML (5 MG/ML) INTRAVENOUS SYRINGE
5-10 EVERY 4 HOURS PRN
Status: COMPLETED | OUTPATIENT
Start: 2019-11-12 | End: 2019-11-12

## 2019-11-12 RX ORDER — ALBUMIN (HUMAN) 12.5 G/50ML
50 SOLUTION INTRAVENOUS ONCE
Status: COMPLETED | OUTPATIENT
Start: 2019-11-12 | End: 2019-11-12

## 2019-11-12 RX ORDER — ONDANSETRON 4 MG/1
4 TABLET, ORALLY DISINTEGRATING ORAL EVERY 30 MIN PRN
Status: DISCONTINUED | OUTPATIENT
Start: 2019-11-12 | End: 2019-11-12 | Stop reason: HOSPADM

## 2019-11-12 RX ORDER — LABETALOL 20 MG/4 ML (5 MG/ML) INTRAVENOUS SYRINGE
10-20 EVERY 6 HOURS PRN
Status: DISCONTINUED | OUTPATIENT
Start: 2019-11-12 | End: 2019-11-12

## 2019-11-12 RX ADMIN — HEPARIN SODIUM 400 UNITS/HR: 10000 INJECTION, SOLUTION INTRAVENOUS at 00:19

## 2019-11-12 RX ADMIN — POLYETHYLENE GLYCOL 3350 17 G: 17 POWDER, FOR SOLUTION ORAL at 08:05

## 2019-11-12 RX ADMIN — LABETALOL 20 MG/4 ML (5 MG/ML) INTRAVENOUS SYRINGE 10 MG: at 16:54

## 2019-11-12 RX ADMIN — NYSTATIN 1000000 UNITS: 500000 SUSPENSION ORAL at 08:15

## 2019-11-12 RX ADMIN — SENNOSIDES AND DOCUSATE SODIUM 2 TABLET: 8.6; 5 TABLET ORAL at 08:05

## 2019-11-12 RX ADMIN — LIDOCAINE HYDROCHLORIDE 15 ML: 20 SOLUTION ORAL; TOPICAL at 09:36

## 2019-11-12 RX ADMIN — Medication 2 MG: at 20:28

## 2019-11-12 RX ADMIN — HYDROMORPHONE HYDROCHLORIDE 0.5 MG: 1 INJECTION, SOLUTION INTRAMUSCULAR; INTRAVENOUS; SUBCUTANEOUS at 22:36

## 2019-11-12 RX ADMIN — ALBUMIN HUMAN 50 G: 0.25 SOLUTION INTRAVENOUS at 07:59

## 2019-11-12 RX ADMIN — SODIUM CHLORIDE: 4.5 INJECTION, SOLUTION INTRAVENOUS at 01:53

## 2019-11-12 RX ADMIN — ONDANSETRON 4 MG: 2 INJECTION INTRAMUSCULAR; INTRAVENOUS at 15:27

## 2019-11-12 RX ADMIN — ALBUMIN HUMAN 12.5 G: 0.05 INJECTION, SOLUTION INTRAVENOUS at 00:01

## 2019-11-12 RX ADMIN — ROCURONIUM BROMIDE 50 MG: 10 INJECTION INTRAVENOUS at 14:00

## 2019-11-12 RX ADMIN — POLYETHYLENE GLYCOL 3350 17 G: 17 POWDER, FOR SOLUTION ORAL at 20:26

## 2019-11-12 RX ADMIN — SENNOSIDES AND DOCUSATE SODIUM 1 TABLET: 8.6; 5 TABLET ORAL at 20:26

## 2019-11-12 RX ADMIN — FENTANYL CITRATE 150 MCG: 50 INJECTION, SOLUTION INTRAMUSCULAR; INTRAVENOUS at 14:00

## 2019-11-12 RX ADMIN — OXYCODONE HYDROCHLORIDE 5 MG: 5 TABLET ORAL at 23:44

## 2019-11-12 RX ADMIN — OXYCODONE HYDROCHLORIDE 5 MG: 5 TABLET ORAL at 20:26

## 2019-11-12 RX ADMIN — LABETALOL 20 MG/4 ML (5 MG/ML) INTRAVENOUS SYRINGE 10 MG: at 17:36

## 2019-11-12 RX ADMIN — PIPERACILLIN SODIUM AND TAZOBACTAM SODIUM 3.38 G: 3; .375 INJECTION, POWDER, LYOPHILIZED, FOR SOLUTION INTRAVENOUS at 03:53

## 2019-11-12 RX ADMIN — POTASSIUM CHLORIDE 20 MEQ: 1.5 POWDER, FOR SOLUTION ORAL at 05:58

## 2019-11-12 RX ADMIN — NYSTATIN 1000000 UNITS: 500000 SUSPENSION ORAL at 20:26

## 2019-11-12 RX ADMIN — FENTANYL CITRATE 50 MCG: 50 INJECTION, SOLUTION INTRAMUSCULAR; INTRAVENOUS at 16:29

## 2019-11-12 RX ADMIN — HYDROMORPHONE HYDROCHLORIDE 0.5 MG: 1 INJECTION, SOLUTION INTRAMUSCULAR; INTRAVENOUS; SUBCUTANEOUS at 17:02

## 2019-11-12 RX ADMIN — SODIUM CHLORIDE, POTASSIUM CHLORIDE, SODIUM LACTATE AND CALCIUM CHLORIDE: 600; 310; 30; 20 INJECTION, SOLUTION INTRAVENOUS at 23:42

## 2019-11-12 RX ADMIN — OLANZAPINE 2.5 MG: 2.5 TABLET, FILM COATED ORAL at 21:51

## 2019-11-12 RX ADMIN — PIPERACILLIN SODIUM AND TAZOBACTAM SODIUM 3.38 G: 3; .375 INJECTION, POWDER, LYOPHILIZED, FOR SOLUTION INTRAVENOUS at 21:51

## 2019-11-12 RX ADMIN — HUMAN INSULIN 12 UNITS/HR: 100 INJECTION, SOLUTION SUBCUTANEOUS at 00:09

## 2019-11-12 RX ADMIN — TRAZODONE HYDROCHLORIDE 50 MG: 50 TABLET ORAL at 21:51

## 2019-11-12 RX ADMIN — HYDROMORPHONE HYDROCHLORIDE 0.5 MG: 1 INJECTION, SOLUTION INTRAMUSCULAR; INTRAVENOUS; SUBCUTANEOUS at 17:38

## 2019-11-12 RX ADMIN — SULFAMETHOXAZOLE AND TRIMETHOPRIM 1 TABLET: 400; 80 TABLET ORAL at 08:05

## 2019-11-12 RX ADMIN — HYDROMORPHONE HYDROCHLORIDE 0.5 MG: 1 INJECTION, SOLUTION INTRAMUSCULAR; INTRAVENOUS; SUBCUTANEOUS at 16:38

## 2019-11-12 RX ADMIN — FENTANYL CITRATE 50 MCG: 50 INJECTION, SOLUTION INTRAMUSCULAR; INTRAVENOUS at 15:10

## 2019-11-12 RX ADMIN — HYDROMORPHONE HYDROCHLORIDE 0.5 MG: 1 INJECTION, SOLUTION INTRAMUSCULAR; INTRAVENOUS; SUBCUTANEOUS at 10:24

## 2019-11-12 RX ADMIN — HYDRALAZINE HYDROCHLORIDE 10 MG: 20 INJECTION INTRAMUSCULAR; INTRAVENOUS at 17:54

## 2019-11-12 RX ADMIN — ALBUMIN HUMAN 12.5 G: 0.05 INJECTION, SOLUTION INTRAVENOUS at 02:10

## 2019-11-12 RX ADMIN — DEXMEDETOMIDINE 0.2 MCG/KG/HR: 100 INJECTION, SOLUTION, CONCENTRATE INTRAVENOUS at 00:10

## 2019-11-12 RX ADMIN — SUGAMMADEX 200 MG: 100 INJECTION, SOLUTION INTRAVENOUS at 16:02

## 2019-11-12 RX ADMIN — SODIUM CHLORIDE, POTASSIUM CHLORIDE, SODIUM LACTATE AND CALCIUM CHLORIDE: 600; 310; 30; 20 INJECTION, SOLUTION INTRAVENOUS at 00:01

## 2019-11-12 RX ADMIN — SODIUM CHLORIDE, POTASSIUM CHLORIDE, SODIUM LACTATE AND CALCIUM CHLORIDE: 600; 310; 30; 20 INJECTION, SOLUTION INTRAVENOUS at 09:34

## 2019-11-12 RX ADMIN — SODIUM CHLORIDE, SODIUM GLUCONATE, SODIUM ACETATE, POTASSIUM CHLORIDE AND MAGNESIUM CHLORIDE: 526; 502; 368; 37; 30 INJECTION, SOLUTION INTRAVENOUS at 13:04

## 2019-11-12 RX ADMIN — SODIUM CHLORIDE: 9 INJECTION, SOLUTION INTRAVENOUS at 13:04

## 2019-11-12 RX ADMIN — METHYLPREDNISOLONE SODIUM SUCCINATE 200 MG: 125 INJECTION, POWDER, LYOPHILIZED, FOR SOLUTION INTRAMUSCULAR; INTRAVENOUS at 08:13

## 2019-11-12 RX ADMIN — MYCOPHENOLATE MOFETIL 750 MG: 200 POWDER, FOR SUSPENSION ORAL at 20:27

## 2019-11-12 RX ADMIN — VALGANCICLOVIR HYDROCHLORIDE 450 MG: 50 POWDER, FOR SOLUTION ORAL at 08:05

## 2019-11-12 RX ADMIN — HUMAN INSULIN 10 UNITS/HR: 100 INJECTION, SOLUTION SUBCUTANEOUS at 02:06

## 2019-11-12 RX ADMIN — METOCLOPRAMIDE 5 MG: 5 INJECTION, SOLUTION INTRAMUSCULAR; INTRAVENOUS at 10:40

## 2019-11-12 RX ADMIN — PIPERACILLIN SODIUM AND TAZOBACTAM SODIUM 3.38 G: 3; .375 INJECTION, POWDER, LYOPHILIZED, FOR SOLUTION INTRAVENOUS at 10:04

## 2019-11-12 RX ADMIN — MYCOPHENOLATE MOFETIL 750 MG: 200 POWDER, FOR SUSPENSION ORAL at 08:05

## 2019-11-12 RX ADMIN — Medication 2 MG: at 08:05

## 2019-11-12 ASSESSMENT — PAIN DESCRIPTION - DESCRIPTORS
DESCRIPTORS: SORE
DESCRIPTORS: ACHING
DESCRIPTORS: ACHING;SORE
DESCRIPTORS: ACHING;SORE
DESCRIPTORS: ACHING

## 2019-11-12 ASSESSMENT — ACTIVITIES OF DAILY LIVING (ADL)
ADLS_ACUITY_SCORE: 14

## 2019-11-12 NOTE — PROGRESS NOTES
SURGICAL ICU PROGRESS NOTE  November 12, 2019      ASSESSMENT: Frandy Workman is a 54M with PMHx significant for cirrhosis 2/2 ESTRADA diagnosed in 2013, HCC 2018, HTN, HLD, GERD, BPH and DMII who is admitted to the SICU s/p DDLT on 11/11/19 with Dr. Ramos      CHANGES TODAY:  discontinue heparin gtt  2U PRBC  500cc albumin   Discontinue precedex  Discontinue fentanyl  Discontinue vasopressin  PST > extubate  Remove MAC  Re-wire MAC  Remove PA Catheter  NJ tube and start tube feeds  Discontinue gan       PLAN:   Neuro/ pain/ sedation:  #Acute postop pain  - Monitor neurological status. Notify the MD for any acute changes in exam.  - Oxy q3 prn, dilaudid q2 IV prn for pain control      Pulmonary care:   -Supplemental oxygen to keep saturation above 92 %.  -VC//12/5/40  -Plan to PST in the AM and extubate if tolerating well      Cardiovascular:    #HTN  #HLD  #CAD  #Acute blood loss anemia  - Monitor hemodynamic status.   - norepinephrine gtt at 0.01 mcg/hr. Plan to wean off today if able  - Intra-operative blood loss of 2400cc, resuscitated with 6U PRBC, 8 FFP, 1U plts, 1U cryo, 3300cc crystalloid, 1L albumin  - PTA carvedilol - held      GI care:   #ESTRADA  #HCC s/p DDLT  #GERD  - NPO except ice chips and medications.  - Plan for feeding tube placement tomorrow  - Bowel regimen with senokot and miralax       Fluids/ Electrolytes/ Nutrition:   - LR @ 100 ml/hr for IV fluid hydration  - ICU electrolyte replacement protocol  - No indication for parenteral nutrition.  - Nutrition consulted. Appreciate recs     Renal/ Fluid Balance:    #BPH  - Urine output has been adequate so far  - Will continue to monitor intake and output.  - discontinue gan      Endocrine:    #DMII  #steroid induced hyperglycemia  - Insulin gtt for BG goals < 180       ID/ Antibiotics:  #transplant immunosuppression   - Zosyn 3.375g IV q6hr  - Bactrim/Septra qDay  - Valcyte 900mg qDay  - Methylprednisolone taper   - Tacrolimus   -  Nystatin suspension   - Mycophenolate       Heme:     #Portal vein thrombosis  #Acute blood loss anemia  - Received a total of 2U PRBC today, will monitor labs and correct to goals accordingly  - No plans for chemoprophylaxis as concern for ongoing bleeding  - Post op: hgb 9.2 plt 57 INR 1.60 fibrinogen 192   -- Transfusion goals: hgb < 8.0; plts < 30; fibrinogen > 200; INR < 2.0         Prophylaxis:    - Mechanical prophylaxis for DVT.   - No chemoprophylaxis 2/2 concern for bleeding risk  - No indication for GI prophylaxis      MSK:    - PT and OT consulted. Appreciate recs.      Lines/ tubes/ drains:  - PIV, ETT, Arterial Line, RIJ MAC       Disposition:  - Surgical ICU.      Patient seen, findings and plan discussed with surgical ICU staff Dr. Elda Kelly MD  Anesthesiology PGY1  [phone]    ====================================    TODAY'S PROGRESS:   SUBJECTIVE:   Overnight was hypotensive, was given 750 ml albumin which he responded to well. In the AM his hgb was 6.9 so he received a unit of PRBC. No acute issues otherwise. Pain well managed. Denies CP, SOB, ab pain/tenderness, calf pain/tenderness, able to follow commands appropriately.     OBJECTIVE:   1. VITAL SIGNS:   Temp:  [97.9  F (36.6  C)-99.3  F (37.4  C)] 97.9  F (36.6  C)  Pulse:  [73] 73  Heart Rate:  [] 76  Resp:  [] 19  BP: (85)/(55) 85/55  MAP:  [51 mmHg-98 mmHg] 58 mmHg  Arterial Line BP: ()/(29-68) 85/29  FiO2 (%):  [30 %] 30 %  SpO2:  [96 %-100 %] 99 %  Ventilation Mode: SIMV/PS  (Synchronized Intermittent Mandatory Ventilation with Pressure Support)  FiO2 (%): 30 %  Rate Set (breaths/minute): 12 breaths/min  Tidal Volume Set (mL): 500 mL  PEEP (cm H2O): 5 cmH2O  Pressure Support (cm H2O): 7 cmH2O  Oxygen Concentration (%): 30 %  Resp: 19      2. INTAKE/ OUTPUT:   I/O last 3 completed shifts:  In: 08020.06 [I.V.:3286.06; NG/GT:270; IV Piggyback:500]  Out: 5545 [Urine:1810; Emesis/NG output:350; Drains:985;  Blood:2400]    3. PHYSICAL EXAMINATION:   General: Laying in bed comfortably  Neuro: responds to commands appropriately  Resp: mech vent. LCTAB  CV: RRR. No m/r/g  Abdomen: Soft, Non-distended, Non-tender.   Incisions: c/d/i. Mild serosangenous drainage. No signs of hematoma, dehiscence.  Extremities: warm and well perfused. Moves all extremities spontaneously. Dp/PT palpable b/l.    4. INVESTIGATIONS:   Arterial Blood Gases   Recent Labs   Lab 11/11/19  1651 11/11/19  1600 11/11/19  1502 11/11/19  1403   PH 7.41 7.48* 7.47* 7.43   PCO2 42 36 36 36   PO2 81 98 103 108*   HCO3 27 26 26 24     Complete Blood Count   Recent Labs   Lab 11/12/19  0346 11/12/19  0007 11/11/19 2213 11/11/19  1854   WBC 9.7 9.4 10.7 9.6   HGB 6.9* 7.9* 8.6* 8.8*   PLT 55* 64* 71* 68*     Basic Metabolic Panel  Recent Labs   Lab 11/12/19  0346 11/11/19 2213 11/11/19  1651 11/11/19  1600  11/10/19  1714   * 143 145* 144   < > 140   POTASSIUM 3.8 4.2 4.1 4.0   < > 4.3   CHLORIDE 114* 113* 114*  --   --  112*   CO2 26 26 27  --   --  24   BUN 24 23 21  --   --  18   CR 1.22 1.18 1.15  --   --  1.18   * 212* 142* 159*   < > 154*    < > = values in this interval not displayed.     Liver Function Tests  Recent Labs   Lab 11/12/19  0346 11/11/19 2213 11/11/19  1651 11/11/19  1600 11/11/19  1500  11/10/19  1714   *  --  382*  --   --   --  64*   *  --  127*  --   --   --  64   ALKPHOS 64  --  90  --   --   --  180*   BILITOTAL 1.8*  --  3.5*  --   --   --  1.4*   ALBUMIN 2.4*  --  2.3*  --   --   --  2.8*   INR 1.47* 1.49*  --  1.60* 1.57*   < > 1.31*    < > = values in this interval not displayed.     Pancreatic Enzymes  Recent Labs   Lab 11/12/19  0346 11/10/19  1714   LIPASE 32*  --    AMYLASE 26* 41     Coagulation Profile  Recent Labs   Lab 11/12/19  0346 11/11/19  2213 11/11/19  1651 11/11/19  1600 11/11/19  1500   INR 1.47* 1.49*  --  1.60* 1.57*   PTT 57*  --  48* 48* 54*         5. RADIOLOGY:   Recent Results  (from the past 24 hour(s))   XR Chest Port 1 View    Narrative    EXAMINATION:  XR CHEST PORT 1 VW 11/11/2019 5:26 PM.    COMPARISON: 11/10/2019.    HISTORY:  cvl placement, pneumothorax    FINDINGS: Portable AP view of the chest. Endotracheal tube tip  projects over the mid thoracic trachea. Right IJ [place] catheter  tip projects over the right pulmonary artery. Enteric tube courses  inferior left hemidiaphragm and beyond field-of-view.    Cardiomediastinal silhouette is stable. Mild pulmonary venous  congestion. Left retrocardiac opacities. Small left pleural effusion.  No pneumothorax. Partially visualized right upper quadrant drain.      Impression    IMPRESSION:   1. Interval intubation with support devices in appropriate position as  described above.  2. Mild pulmonary venous congestion and small left pleural effusion.  3. Left retrocardiac opacities likely subsegmental atelectasis.    I have personally reviewed the examination and initial interpretation  and I agree with the findings.    AHSAN AMEZCUA MD   XR Abdomen Port 1 View    Narrative    Single view of the abdomen    Comparisons: None.    HISTORY: Nasogastric tube placement    FINDINGS: There is nasogastric tube with the tip and sidehole in the  stomach. Right upper quadrant and mid abdominal drains. Biliary stent  is located to the right of the spine midabdomen. Surgical clips in the  right upper quadrant. No dilated loop of bowel is identified. Patchy  left basilar opacities with air bronchograms.      Impression    IMPRESSION:  1. Nasogastric tube in good position.  2. Left basilar consolidation/atelectasis.  3. Nonobstructive bowel gas pattern.    AHSAN AMEZCUA MD       =========================================      [facility] [place]

## 2019-11-12 NOTE — PLAN OF CARE
Admitted/transferred from: OR  Reason for admission/transfer: s/p DDLT  Patient status upon admission/transfer: on levo and vaso, propofol, insulin gtts. SR, stable BPs, sedated. Most recent labs stable. Softening BPs requiring decreased sedation and increased pressors. Increased left LUANNE output. MD aware of changes.    Interventions: 1 liter total LR boluses. BPs continue to be soft (MAPs 60-65). Hemoglobin stable 8.8.    2 RN skin assessment: completed by Jesika Morrow and Isabell Tucker.  Result of skin assessment and interventions/actions: no existing pressure injury. Abdominal incision WDL. Bruising.   Height, weight, drug calc weight: done  Patient belongings: no belongings sent with patient from OR.  MDRO education (if applicable): MRSA swab done.

## 2019-11-12 NOTE — PROGRESS NOTES
Patient removed from [facility] waitlist after  donor liver transplant. [facility] ID [id number].   Donor PHS increased risk: No.   If Yes, MD documentation N/A.

## 2019-11-12 NOTE — PROGRESS NOTES
Final positive donor urine and sputum culture results have been uploaded to [facility].  Donor ID [id number].  Dr. Rodriguez and Josh notified of results.

## 2019-11-12 NOTE — PROVIDER NOTIFICATION
SICU contacted regarding systolics in 150's-160's per art line, cuff pressures systolics in mid 140's. Order obtained to administer labetalol and continue to monitor. Notify team of any changes in status.

## 2019-11-12 NOTE — PROGRESS NOTES
Transplant Surgery  Inpatient Daily Progress Note  11/12/2019    Assessment & Plan: Frandy Workman is a 54 year old male with a past medical history of cirrhosis secondary to ESTRADA diagnosed in 2013, HCC 2018, HTN, HLD, GERD, BPH, and DM2 who underwent a DBD liver transplant on 11/11/19 with Dr. Ramos.      Graft function: LFTs downtrending POD 1. Tbili 1.8 (from 3.5 yesterday). INR 1.46.  POD1 U/S pending.  JPs with sanguinous output, L>R. Return to OR this afternoon for bleeding.  Immunosuppression management:   Induction: Solumedrol taper per protocol  MMF: 750mg BID  FK:  2mg BID; tac level ordered for tomorrow (goal 10-12)  Complexity of management:Medium. Contributing factors: thrombocytopenia, anemia and induction  Hematology:   Acute blood loss anemia: Hgb 6.9 this morning; 1unit PRBC replaced and heparin gtt discontinued. Recheck 5.7; recheck unchanged; plan to transfuse with 3 units PRBCs. Patient to return to OR for washout today.  Thrombocytopenia: Plt 19 (from 55). Recheck unchanged.  2 units platelets ordered.  Hypofibrinogenemia: <200; 5 units cryoprecipitate ordered.  Leukopenia: WBC 2.3.  Monitor.  Cardiorespiratory: CVPs 8-12. Was stable on vent; PS 7.  Extubated this morning. Re intubate for OR.  GI/Nutrition: NGT to LIS. Bridled NJ placed; plan to initiate TF today at 10mL/hr (goal 60mL/hr).  Bowel regimen ordered.  Endocrine:   DM2 with steroid-induced hyperglycemia: -181; insulin drip titrated per nursing.  Fluid/Electrolytes: MIVF: LR 100mL/hr. Received 75 grams albumin since midnight. Electrolytes replaced per ICU protocol.  : Carroll to be removed today.  Infectious disease: Tmax 99.3. Continue zosyn x48 hours.   Neuro: Patient alert; off sedation. C/O minimal pain.  Prophylaxis: DVT (SCDs), fall, fungal (Nystatin), viral (Valcyte), pneumocystis (Bactrim)  Disposition: SICU    Medical Decision Making: High  Subsequent visit 17549 (high level decision  making)    SERA/Fellow/Resident Provider: Mellisa Nelson NP [id number]    Faculty: Uma Moreno M.D.    __________________________________________________________________  Transplant History:    11/11/2019 (Liver), Postoperative day: 1     Interval History: Unable to obtain a history from the patient due to intubation  Overnight events: No acute events overnight.  Patient seen in SICU; intubated but off sedation.  He is alert and follows commands. Shakes his head no when asked if he is experiencing pain.       ROS:   A 10-point review of systems was negative except as noted above.    Curent Meds:    alpha-lipoic acid  600 mg Oral Daily     [Held by provider] carvedilol  12.5 mg Oral BID w/meals     [START ON 11/13/2019] methylPREDNISolone  100 mg Intravenous Once    Followed by     [START ON 11/14/2019] methylPREDNISolone  50 mg Intravenous Once    Followed by     [START ON 11/15/2019] predniSONE  25 mg Oral Once    Followed by     [START ON 11/16/2019] predniSONE  10 mg Oral Once     metoclopramide  5 mg Intravenous Once     mycophenolate  750 mg Oral BID IS    Or     mycophenolate  750 mg Oral or NG Tube BID IS     nystatin  10 mL Mouth/Throat 4x Daily     OLANZapine  2.5 mg Oral At Bedtime     piperacillin-tazobactam  3.375 g Intravenous Q6H     polyethylene glycol  17 g Oral BID     senna-docusate  1 tablet Oral BID    Or     senna-docusate  2 tablet Oral BID     sodium chloride (PF)  3 mL Intravenous Q8H     sulfamethoxazole-trimethoprim  1 tablet Oral Daily     tacrolimus  2 mg Oral BID IS    Or     tacrolimus  2 mg Oral or NG Tube BID IS     traZODone  50 mg Oral At Bedtime     valGANciclovir  450 mg Oral Daily    Or     valGANciclovir  450 mg Oral or NG Tube Daily       Physical Exam:     Admit Weight: 79.4 kg (175 lb 1.6 oz)    Current Vitals:   BP (!) 85/55   Pulse 73   Temp 97.9  F (36.6  C) (Axillary)   Resp 20   Wt 83.1 kg (183 lb 3.2 oz)   SpO2 100%   BMI 27.05 kg/m      Vital sign ranges:    Temp:   [97.9  F (36.6  C)-99.3  F (37.4  C)] 97.9  F (36.6  C)  Pulse:  [73] 73  Heart Rate:  [] 73  Resp:  [] 20  BP: (85)/(55) 85/55  MAP:  [51 mmHg-98 mmHg] 60 mmHg  Arterial Line BP: ()/(29-68) 94/43  FiO2 (%):  [30 %] 30 %  SpO2:  [96 %-100 %] 100 %    General Appearance: in no apparent distress.   Skin: normal, warm, dry  Heart: NSR; well-perfused  Lungs: on vent, 30% SIMV P5  Abdomen: operative dressing in place. Left and right JPs with sanguinous output.  : Carroll in place; patent.  Extremities: edema: none noted in BLE.  Neurologic: Alert; follows commands.     Frailty Scores     There is no flowsheet data to display.          Data:   CMP  Recent Labs   Lab 11/12/19  0346 11/11/19  2213 11/11/19  1651 11/11/19  1600  11/10/19  1714   * 143 145* 144   < > 140   POTASSIUM 3.8 4.2 4.1 4.0   < > 4.3   CHLORIDE 114* 113* 114*  --   --  112*   CO2 26 26 27  --   --  24   * 212* 142* 159*   < > 154*   BUN 24 23 21  --   --  18   CR 1.22 1.18 1.15  --   --  1.18   GFRESTIMATED 66 69 71  --   --  69   GFRESTBLACK 76 80 82  --   --  80   DEBORAH 9.1 9.1 9.7  --   --  9.3   ICAW 5.1  --   --  5.9*   < >  --    MAG 2.2  --  1.8  --   --  2.1   PHOS 3.5  --  2.6  --   --  2.5   AMYLASE 26*  --   --   --   --  41   LIPASE 32*  --   --   --   --   --    ALBUMIN 2.4*  --  2.3*  --   --  2.8*   BILITOTAL 1.8*  --  3.5*  --   --  1.4*   ALKPHOS 64  --  90  --   --  180*   *  --  382*  --   --  64*   *  --  127*  --   --  64    < > = values in this interval not displayed.     CBC  Recent Labs   Lab 11/12/19  0950 11/12/19  0346   HGB 5.7* 6.9*   WBC 2.3* 9.7   PLT 19* 55*

## 2019-11-12 NOTE — OP NOTE
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 Exploratory laparotomy   2. Hematoma evacuation   3. Abdominal washout       Surgeon:  Surgeon(s) and Role:     * Александр Ramos MD - Primary        Fellow/assistant:   Gonzalo Rodriguez MD - Fellow -     Juan Moore MD PGY-5    Anesthesia:  General    Specimen:   none    Estimated blood loss:  100 ml     Intraoperative Events: none    Complications: None.    Findings: perihepatic hematoma. 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 Overnight his hemoglobin dropped acutely to and he was also thrombocytopenic. Abdominal ultrasound was performed and revealed intra-abdominal hematomas. We elected to proceed with exploratory laparotomy after discussing the risks and benefits of proceeding. The patient agreed to proceed with surgery and provided informed consent.    Procedure:  Frandy Workman was brought to the operating room, placed in a supine position, and a time out was performed. Sequential compression devices were placed on both lower extremities and general endotracheal anesthesia was induced.  The patient was given IV antibiotics, and  Solumedrol. A Carroll catheter was placed. A central line was placed by Anesthesia service. The abdomen was then prepped, and draped in the usual sterile fashion.    We begin by removing the skin staples and cutting through the sutures in the patient's wound to access the abdominal cavity. We evacuated some hematoma and unclogged our LUANNE drains. We then had a thorough look at the abdominal cavity and copiously washed the abdomen. There was some raw  surface hemorrhage in the bare area of the liver and along the retroperitoneum. We used Argon Beam Coagulator and topical hemostatic agents to achieve hemostasis. The patient also received blood and platelets. After holding pressure. We once again irrigated the abdomen copiously and hemostasis was achieved.The fascia was closed in 2 layers using 0 PDS followed by closure of the skin with the stapler. All needle, sponge, and instrument counts were accurate. The patient tolerated the procedure well without apparent complications and was transferred to the PACU in good condition. Faculty was present for critical portions of the procedure.    I was present during the key portions of the procedure, and I was immediately available for the entire procedure.

## 2019-11-12 NOTE — PLAN OF CARE
4A - Pt returned to OR this afternoon and not available for PT. Will re-assess as medically appropriate.

## 2019-11-12 NOTE — ANESTHESIA PREPROCEDURE EVALUATION
Anesthesia Pre-Procedure Evaluation    Patient: Frandy Workman   MRN:     [id number] Gender:   male   Age:    54 year old :      1965        Preoperative Diagnosis: Status post liver transplant (H) [Z94.4]   Procedure(s):  RETURN TO LIVER     Past Medical History:   Diagnosis Date     Anemia     Low blood plates current is 37     Arthritis      BPH (benign prostatic hyperplasia)      CAD (coronary artery disease) 2019     Cholelithiasis      Conductive hearing loss 2017    Have a lump on my right side of my face.  Had wax discharge     Depressive disorder 1986    Suffer effects throughout life     Gastroesophageal reflux disease 2014    Being treated with Prilosac     HCC (hepatocellular carcinoma) (H) 2019     History of diabetic retinopathy 2018     HTN (hypertension)      Hyperlipidemia      Liver cirrhosis secondary to ESTRADA (H)      Portal vein thrombosis 2019     Type II diabetes mellitus (H)     Insulin adminstered BID daily.       Past Surgical History:   Procedure Laterality Date     COLONOSCOPY           CV HEART CATHETERIZATION WITH POSSIBLE INTERVENTION N/A 2019    Procedure: CORS;  Surgeon: Jagdish Hoyt MD;  Location: [facility] CARDIAC CATH LAB     ESOPHAGOSCOPY, GASTROSCOPY, DUODENOSCOPY (EGD), COMBINED N/A 2016    Procedure: COMBINED ESOPHAGOSCOPY, GASTROSCOPY, DUODENOSCOPY (EGD);  Surgeon: Santi Rosas MD;  Location:  GI     ESOPHAGOSCOPY, GASTROSCOPY, DUODENOSCOPY (EGD), COMBINED N/A 2017    Procedure: COMBINED ESOPHAGOSCOPY, GASTROSCOPY, DUODENOSCOPY (EGD);  EGD;  Surgeon: Santi Rosas MD;  Location:  GI     ESOPHAGOSCOPY, GASTROSCOPY, DUODENOSCOPY (EGD), COMBINED N/A 2018    Procedure: EGD;  Surgeon: Santi Rosas MD;  Location:  OR     HEAD & NECK SURGERY      2017 at [facility].      IMPLANT GOLD WEIGHT EYELID Right 2017    Procedure: IMPLANT WEIGHT EYELID;  Right Upper Eyelid  Weight, right tarsal strip lower eyelid;  Surgeon: Milana Malave MD;  Location: [facility] OR     IR CHEMO EMBOLIZATION  2019     KNEE SURGERY Left      ORTHOPEDIC SURGERY       PAROTIDECTOMY, RADICAL NECK DISSECTION Right 2017    Procedure: PAROTIDECTOMY, RADICAL NECK DISSECTION;  Right Superfacial Parotidectomy , Facial nerve repair. with NIM facial nerve monitor.;  Surgeon: Asiya Morgan MD;  Location: [facility] OR     PICC INSERTION Left 2017    4fr SL BioFlo PICC, 44cm in the L basilic vein w/ tip in the low SVC     TRANSPLANT LIVER RECIPIENT  DONOR N/A 2019    Procedure: TRANSPLANT, LIVER, RECIPIENT,  DONOR;  Surgeon: Александр Ramos MD;  Location: [facility] OR     VASCULAR SURGERY            Anesthesia Evaluation     . Pt has had prior anesthetic. Type: General           ROS/MED HX    ENT/Pulmonary:  - neg pulmonary ROS     Neurologic:  - neg neurologic ROS     Cardiovascular: Comment: Stress Test: 2019  Conclusions: dobutamine stress echocardiogram test inconclusive for ischemia  given inability to achieve target HR (66% MPHR).     Patient experienced no chest pain with peak stress.  Test terminated due to maximum infusion without HR response.  Normal BP and blunted HR response to dobutamine.  At baseline, LVEF 65%, normal wall motion.  At low and peak dose dobutamine, walls recruit normally. LV function augments  to 75%.  EKG at rest and with stress with no ischemic changes.  Screening 2D echocardiogram with Doppler interrogation demonstrated no  significant valvular disease. Aortic root is dilated (4.0 cm at Sinuses of  Valsalva, indexed 2.0 cm/m2). Normal PA systolic pressure (17 mmHg over RA  pressure).    (+) Dyslipidemia, hypertension----. : . . . :. .       METS/Exercise Tolerance:     Hematologic: Comments: THrombocytopenia due to splenic sequestration    (+) Anemia, History of Transfusion -      Musculoskeletal:  - neg musculoskeletal ROS       GI/Hepatic: Comment:  Patient with history of ESTRADA and HCC  History of hepatic encephalopathy. Currently no symptoms. No GI bleed  History of esophageal varies  Portal HTN    (+) GERD hepatitis type Other, liver disease,       Renal/Genitourinary:         Endo:     (+) type II DM .      Psychiatric:     (+) psychiatric history depression      Infectious Disease:         Malignancy:   (+) Malignancy History of GI  Hepaticellular carcinoma        Other:    - neg other ROS                     PHYSICAL EXAM:   Mental Status/Neuro: A/A/O   Airway: Facies: Feasible  Mallampati: I  Mouth/Opening: Full  TM distance: > 6 cm  Neck ROM: Full   Respiratory: Auscultation: CTAB     Resp. Rate: Normal     Resp. Effort: Normal      CV: Rhythm: Regular  Rate: Age appropriate  Heart: Normal Sounds  Edema: None   Comments:      Dental: Normal Dentition                LABS:  CBC:   Lab Results   Component Value Date    WBC 2.0 (L) 11/12/2019    WBC 2.3 (L) 11/12/2019    HGB 5.6 (LL) 11/12/2019    HGB 5.7 (LL) 11/12/2019    HCT 16.1 (L) 11/12/2019    HCT 16.6 (L) 11/12/2019    PLT 20 (LL) 11/12/2019    PLT 19 (LL) 11/12/2019     BMP:   Lab Results   Component Value Date     (H) 11/12/2019     11/11/2019    POTASSIUM 3.8 11/12/2019    POTASSIUM 4.2 11/11/2019    CHLORIDE 114 (H) 11/12/2019    CHLORIDE 113 (H) 11/11/2019    CO2 26 11/12/2019    CO2 26 11/11/2019    BUN 24 11/12/2019    BUN 23 11/11/2019    CR 1.22 11/12/2019    CR 1.18 11/11/2019     (H) 11/12/2019     (H) 11/11/2019     COAGS:   Lab Results   Component Value Date    PTT 57 (H) 11/12/2019    INR 1.46 (H) 11/12/2019    FIBR 159 (L) 11/12/2019     POC:   Lab Results   Component Value Date     (H) 11/12/2019     OTHER:   Lab Results   Component Value Date    PH 7.41 11/11/2019    LACT 1.5 11/11/2019    A1C 6.6 (H) 08/08/2018    DEBORAH 9.1 11/12/2019    PHOS 3.5 11/12/2019    MAG 2.2 11/12/2019    ALBUMIN 2.4 (L) 11/12/2019    PROTTOTAL 4.6 (L) 11/12/2019      "(H) 11/12/2019     (H) 11/12/2019    ALKPHOS 64 11/12/2019    BILITOTAL 1.8 (H) 11/12/2019    LIPASE 32 (L) 11/12/2019    AMYLASE 26 (L) 11/12/2019    JAMARI 28 01/24/2019    TSH 0.49 08/08/2018    T4 1.21 08/08/2018        Preop Vitals    BP Readings from Last 3 Encounters:   11/11/19 (!) 85/55   10/28/19 99/56   10/28/19 99/56    Pulse Readings from Last 3 Encounters:   11/12/19 73   10/28/19 63   10/28/19 63      Resp Readings from Last 3 Encounters:   11/12/19 11   10/28/19 14   10/01/19 16    SpO2 Readings from Last 3 Encounters:   11/12/19 100%   10/28/19 99%   10/28/19 99%      Temp Readings from Last 1 Encounters:   11/12/19 36.5  C (97.7  F) (Axillary)    Ht Readings from Last 1 Encounters:   10/28/19 1.753 m (5' 9\")      Wt Readings from Last 1 Encounters:   11/12/19 83.1 kg (183 lb 3.2 oz)    Estimated body mass index is 27.05 kg/m  as calculated from the following:    Height as of 10/28/19: 1.753 m (5' 9\").    Weight as of this encounter: 83.1 kg (183 lb 3.2 oz).     LDA:  Peripheral IV 11/10/19 Left;Anterior Lower forearm (Active)   Site Assessment [facility] 11/12/2019  8:00 AM   Line Status Saline locked;Checked every 1-2 hour 11/12/2019  8:00 AM   Phlebitis Scale 0-->no symptoms 11/12/2019  8:00 AM   Infiltration Scale 0 11/12/2019  8:00 AM   Infiltration Site Treatment Method  None 11/11/2019 12:00 AM   Extravasation? No 11/12/2019  8:00 AM   Number of days: 2       Peripheral IV 11/11/19 Right Hand (Active)   Site Assessment [facility] 11/12/2019  8:00 AM   Line Status Infusing 11/12/2019  8:00 AM   Phlebitis Scale 0-->no symptoms 11/12/2019  8:00 AM   Infiltration Scale 0 11/12/2019  8:00 AM   Extravasation? No 11/12/2019  8:00 AM   Number of days: 1       Peripheral IV 11/11/19 Left Hand (Active)   Site Assessment [facility] 11/12/2019  8:00 AM   Line Status Saline locked;Checked every 1-2 hour 11/12/2019  8:00 AM   Phlebitis Scale 0-->no symptoms 11/12/2019  8:00 AM   Infiltration Scale 0 11/12/2019  8:00 AM "   Extravasation? No 11/12/2019  8:00 AM   Number of days: 1       Arterial Line 11/11/19 Radial (Active)   Site Assessment [facility] 11/12/2019  8:00 AM   Line Status Pulsatile blood flow 11/12/2019  8:00 AM   Arterine Line Cap Change Due 11/15/19 11/12/2019  8:00 AM   Art Line Waveform Appropriate 11/12/2019  8:00 AM   Art Line Interventions Leveled;Flushed per protocol 11/12/2019  8:00 AM   Color/Movement/Sensation Capillary refill less than 3 sec 11/12/2019  8:00 AM   Line Necessity Yes, meets criteria 11/12/2019  8:00 AM   Dressing Type Transparent 11/12/2019  8:00 AM   Dressing Status Clean, dry, intact 11/12/2019  8:00 AM   Dressing Change Due 11/17/19 11/12/2019  8:00 AM   Number of days: 1       CVC Double Lumen 11/11/19 Right Internal jugular (Active)   Site Assessment [facility] 11/12/2019  8:00 AM   Dressing Intervention Chlorhexidine sponge;Transparent 11/12/2019  8:00 AM   Dressing Change Due 11/17/19 11/12/2019  8:00 AM   CVC Comment Proximal 11/12/2019  8:00 AM   Lumen A - Color BROWN 11/12/2019  8:00 AM   Lumen A - Status infusing 11/12/2019  8:00 AM   Lumen A - Cap Change Due 11/15/19 11/12/2019  8:00 AM   Lumen B - Color WHITE 11/12/2019  8:00 AM   Lumen B - Status infusing 11/12/2019  8:00 AM   Lumen B - Cap Change Due 11/15/19 11/12/2019  8:00 AM   Extravasation? No 11/12/2019  8:00 AM   Number of days: 1       CVC Double Lumen 11/11/19 Right Internal jugular (Active)   Site Assessment [facility] 11/12/2019  8:00 AM   Dressing Intervention Chlorhexidine sponge;Transparent 11/12/2019  8:00 AM   Dressing Change Due 11/17/19 11/12/2019  8:00 AM   CVC Comment Distal 11/12/2019  8:00 AM   Lumen A - Color WHITE 11/12/2019  8:00 AM   Lumen A - Status infusing 11/12/2019  8:00 AM   Lumen A - Cap Change Due 11/15/19 11/12/2019  8:00 AM   Lumen B - Color BROWN 11/12/2019  8:00 AM   Lumen B - Status infusing 11/12/2019  8:00 AM   Lumen B - Cap Change Due 11/15/19 11/12/2019  8:00 AM   Extravasation? No 11/12/2019  8:00 AM    Number of days: 1       Right Radial Interventional Procedure Access (Active)   Site Assessment [facility] 2/26/2019 12:15 PM   Hemostasis management Radial hemostasis device on patient 2/26/2019 12:15 PM   Radial device volume remaining 0 mL 2/26/2019 12:15 PM   CMS Right Arm [facility] 2/26/2019 12:15 PM   Radial Pulse - Right Arm Normal 2/26/2019 12:15 PM   Number of days: 259       ETT (adult) 7 (Active)   Secured By Commercial tube sahin 11/12/2019  8:00 AM   Site Appearance Clean and dry 11/12/2019  8:00 AM   Secured at (cm) to lip 24 cm 11/12/2019  8:00 AM   Site of ET Tube Securement At lip 11/12/2019  8:00 AM   Cuff Pressure - Type minimal occluding volume 11/12/2019  7:26 AM   Tube Care/Reposition repositioned tube left side of mouth 11/12/2019  8:00 AM   Bite Block Secure and Patent 11/12/2019  8:00 AM   Safety Measures manual resuscitator/mask/valve in room 11/12/2019  8:00 AM   Number of days: 1       Closed/Suction Drain 1 Left LLQ Bulb 19 [place] (Active)   Site Description [facility] 11/12/2019  8:00 AM   Dressing Status Normal: Clean, Dry & Intact 11/12/2019  8:00 AM   Dressing Change Due 11/13/19 11/12/2019  8:00 AM   Drainage Appearance Bloody/Bright Red 11/12/2019  8:00 AM   Status To bulb suction 11/12/2019  8:00 AM   Output (ml) 35 ml 11/12/2019 10:00 AM   Number of days: 1       Closed/Suction Drain 2 RLQ Bulb 19 [place] (Active)   Site Description [facility] 11/12/2019  8:00 AM   Dressing Status Normal: Clean, Dry & Intact 11/12/2019  8:00 AM   Dressing Change Due 11/13/19 11/12/2019  8:00 AM   Drainage Appearance Bloody/Bright Red 11/12/2019  8:00 AM   Status To bulb suction 11/12/2019  8:00 AM   Output (ml) 5 ml 11/12/2019 10:00 AM   Number of days: 16       Urethral Catheter Latex;Temperature probe;Straight-tip 16 fr (Active)   Tube Description Positional 11/12/2019  8:00 AM   Catheter Care Done;Catheter wipes 11/12/2019 12:00 AM   Collection Container Standard;Patent 11/12/2019  8:00 AM   Securement Method Securing  device (Describe) 11/12/2019  8:00 AM   Rationale for Continued Use Strict 1-2 Hour I&O 11/12/2019  8:00 AM   Urine Output 100 mL 11/12/2019 10:00 AM   Number of days: 1        Assessment:   ASA SCORE: 4 emergent   H&P: History and physical reviewed and following examination; no interval change.   Smoking Status:  Non-Smoker/Unknown   NPO Status: NPO Appropriate     Plan:   Anes. Type:  General   Pre-Medication: None   Induction:  IV (Standard)   Airway: ETT; Oral   Access/Monitoring: PIV; A-Line; 2nd PIV; MAC-Line   Maintenance: Balanced     Blood products: Cell Saver; FFP; PLT; Cryo; PRBC; Blood in Room     Drips/Meds: Norepi; Vasopressin     Postop Plan:   Postop Pain: Opioids  Postop Sedation/Airway: Not planned  Disposition: ICU     PONV Management:   Adult Risk Factors:, Non-Smoker, Postop Opioids   Prevention: Ondansetron     CONSENT: Direct conversation   Plan and risks discussed with: Patient   Blood Products: Consented (ALL Blood Products)       Comments for Plan/Consent:  Patient slowly dropping hematocrit, thus need to come back and re-explore                 James Cobb DO

## 2019-11-12 NOTE — PROGRESS NOTES
Patient suctioned and electively extubated per physician order. Placed on LFNC @ 3 LPM, breath sounds were diminished, labs pending. Patient tolerated procedure well without any immediate complications.    Latanya Mckeon, RT, RT  11/12/2019 11:22 AM

## 2019-11-12 NOTE — ANESTHESIA POSTPROCEDURE EVALUATION
Anesthesia POST Procedure Evaluation    Patient: Frandy Workman   MRN:     [id number] Gender:   male   Age:    54 year old :      1965        Preoperative Diagnosis: Status post liver transplant (H) [Z94.4]   Procedure(s):  Exploratory laparotomy, hematoma evacuation, abdominal washout   Postop Comments: No value filed.       Anesthesia Type:  Not documented  General    Reportable Event: NO     PAIN: Uncomplicated   Sign Out status: Comfortable, Well controlled pain     PONV: No PONV   Sign Out status:  No Nausea or Vomiting     Neuro/Psych: Uneventful perioperative course   Sign Out Status: Preoperative baseline; Age appropriate mentation     Airway/Resp.: Uneventful perioperative course   Sign Out Status: Non labored breathing, age appropriate RR; Resp. Status within EXPECTED Parameters     CV: Uneventful perioperative course   Sign Out status: OTHER     Blood pressure:  HypERtension (mild/mod.)     Disposition:   Sign Out in:  PACU  Disposition:  ICU  Recovery Course: Uneventful  Follow-Up: Not required     Comments/Narrative:  HTN requiring labetalol, hydralazine. Will continue close monitoring in ICU.           Last Anesthesia Record Vitals:  CRNA VITALS  2019 1549 - 2019 1649      2019             Resp Rate (observed):  (!) 1          Last PACU Vitals:  Vitals Value Taken Time   /84 2019  5:40 PM   Temp 36.3  C (97.4  F) 2019  4:45 PM   Pulse 91 2019  5:40 PM   Resp 8 2019  5:15 PM   SpO2 99 % 2019  5:43 PM   Temp src     NIBP     Pulse     SpO2     Resp     Temp     Ht Rate     Temp 2     Vitals shown include unvalidated device data.      Electronically Signed By: Arie Grider DO, 2019, 5:45 PM

## 2019-11-12 NOTE — PROCEDURES
Small Bowel Feeding Tube Placement Assessment  Reason for Feeding Tube Placement: post pyloric feeding tube per transplant team  Cortrak Start Time: 9:44   Cortrak End Time: 10:02  Medicine Delivered During Procedure: lidocaine  Placement Successful:  Presume GASTRIC (pending AXR confirmation).    Procedure Complications: none ( FT kinking while near pylorus)  Final Placement Vini at exit of nare 90 cm  Face to Face time with patient: 30 min      Brenda Carrillo, RD, MS, LD  SICU: [phone]

## 2019-11-12 NOTE — PHARMACY-TRANSPLANT NOTE
Adult Liver Transplant Post Operative Note    54 year old male s/p  donor liver transplant on 19 for ESTRADA and HCC.      Planned immunosuppression regimen to include:   INDUCTION with: methylprednisolone/prednisone taper through POD #5.  MAINTENANCE to include mycophenolate and tacrolimus with initial goal trough levels of 10-12 mcg/L for 0-3 months post-transplant.  Patient may continue prednisone at 5 mg PO daily until tacrolimus level is therapeutic.     Surgical prophylaxis includes: piperacillin-tazobactam IV for 48 hours.    Opportunistic pathogen prophylaxis includes: trimethoprim/sulfamethoxazole, valganciclovir, and nystatin.    Patient is not enrolled in medication study.    Pharmacy will monitor for medication interactions and immunosuppression levels in conjunction with the team. Medication therapy needs for discharge planning will continue to be addressed throughout the current admission via multidisciplinary rounds and order review.  Pharmacy will make recommendations as appropriate.

## 2019-11-12 NOTE — PLAN OF CARE
OT: Per discussion w/ RN, plan for weaning sedation and extubating later in the day as able. OT will check back as appropriate or reschedule for 11/13/19.

## 2019-11-12 NOTE — PROVIDER NOTIFICATION
Surgical MD at bedside, informed of persistent hypertension into 170's despite administration of a total of 20mg of labetalol. MD to order an additional PRN antihypertensive. Continue to monitor.

## 2019-11-12 NOTE — PROGRESS NOTES
CLINICAL NUTRITION SERVICES - ASSESSMENT NOTE     Nutrition Prescription    RECOMMENDATIONS FOR MDs/PROVIDERS TO ORDER:  Fluids and bowel regimen per team     Malnutrition Status:     Non-severe malnutrition in the context of chronic illness    Recommendations already ordered by Registered Dietitian (RD):  1. Once new TF is placed and confirmed by XR, begin TF with Nutren 1.5 @ 10 ml/hr and adv per providers request, ( ONLY advance if K+/mg++ WNL and Phos >1.9) to goal @ 60 ml/hr.   - Access: NGT ( reglan given) Abdominal xray for this afternoon once extubated      - Nutren 1.5 @ 60 mL/hr to provide 2160 kcals (26 kcal/kg/day), 98 g PRO (1.2 g/kg/day), 1094 mL H2O, 253 g CHO and no fiber daily.+ 2 pkts Prosource   TF+ Prosource: 2240 kcals ( 27 kcals/kg), 120 g pro( 1.4 g/kg pro)      2. Monitor K+/Mg++/Phos daily with TF start and advancement to goal infusion to evaluate for refeeding risk, replace per protocol       3. Order free water flushes 30 ml every 4 hours for tube patency.      4. Recommend Certavite (15 ml/day via FT) to ensure adequate micronutrient needs being met.      5. Once started TF, Order baseline (CRP) and (prealbumin), with Recheck every Monday to evaluate trend with immune-modulating TF therapy Received.    6. Order H2O flushes of 30 mL q 4 hrs (180 mL + TF = 1366 mL/day) for tube patency.  Note this does not meet pt's full hydration needs. (If TF to meet full hydration needs flush with 140 mL q3hrs or 95 mL q2hrs pending tolerance).      Future/Additional Recommendations:  Tolerance to TF and advancement.     If need for renal formula pending electrolytes, RD to recommend: Nepro @ goal 55 ml/hr (1320 ml/day) to provide 2376 kcals (29 kcal/kg/day), 107 g PRO (1.3 g/kg/day), 964 ml free H2O, 213 g CHO and 17 g Fiber daily.     REASON FOR ASSESSMENT  Frandy Workman is a/an 54 year old male assessed by the dietitian for Provider Order - Registered Dietitian to Assess and Order TF per Medical  "Nutrition Therapy Protocol    NUTRITION HISTORY  Unable to obtain diet hx per pt. Pt was last seen by outpatient RD on 2/5/19. At this time pt noted cooking for himself w/o salt, and a stable weight, however did notice some arm and leg muscle loss.     11/11: Liver transplant   DM2, steroid induced hyperglycemia   - NGT to LIS with about 350 ml bile, yellow output overnight.    CURRENT NUTRITION ORDERS  Diet: NPO    LABS  Na: 145 (H), Tbili: 3.5>>1.8 (H), Lipase: 32 (L), Labs reviewed    MEDICATIONS  Mycophenolate, Medications reviewed    ANTHROPOMETRICS  Height: 5' 9\"  Most Recent Weight: 83.1 kg (183 lb 3.2 oz)    IBW: 72.7 kg  BMI: Overweight BMI 25-29.9  Weight History:   Wt Readings from Last 10 Encounters:   11/12/19 83.1 kg (183 lb 3.2 oz)   10/28/19 78 kg (172 lb)   10/28/19 78.4 kg (172 lb 12.8 oz)   10/01/19 80.1 kg (176 lb 8 oz)   08/14/19 (P) 83.3 kg (183 lb 11.2 oz)   07/22/19 83.5 kg (184 lb 1.4 oz)   07/22/19 83.5 kg (184 lb)   07/08/19 82.3 kg (181 lb 6.4 oz)   04/22/19 81.2 kg (179 lb)   04/01/19 85 kg (187 lb 6.4 oz)     Dosing Weight: 83 kg (actual)    ASSESSED NUTRITION NEEDS  Estimated Energy Needs: 1646-7021 kcals/day (25 - 30 kcals/kg)  Justification: Increased needs and Post-op  Estimated Protein Needs: 108-149 grams protein/day (1.3 - 1.8 grams of pro/kg)  Justification: Hypercatabolism with critical illness, Increased needs and Post-op  Estimated Fluid Needs: 1 mL/kcal/day  Justification: Maintenance and Per provider pending fluid status    PHYSICAL FINDINGS  See malnutrition section below.    MALNUTRITION  % Intake: Unable to assess  % Weight Loss: None noted  Subcutaneous Fat Loss: Upper arm and Lower arm: mild  Muscle Loss: Temporal, Thoracic region (clavicle, acromium bone, deltoid, trapezius, pectoral), Upper arm (bicep, tricep), Lower arm  (forearm), Dorsal hand, Upper leg (quadricep, hamstring) and Posterior calf: mild-moderate  Fluid Accumulation/Edema: None noted  Malnutrition " Diagnosis: Non-severe malnutrition in the context of chronic illness    NUTRITION DIAGNOSIS  Inadequate oral intake related to post op liver transplant and intubation inhibiting PO intakes as evidenced by NPO x 24 hours and need for EN.     INTERVENTIONS  Implementation  Nutrition Education: Not appropriate at this time due to patient condition   Enteral Nutrition - Initiate     Goals  Total avg nutritional intake to meet a minimum of 25 kcal/kg and 1.3 g PRO/kg daily (per dosing wt 83 kg).     Monitoring/Evaluation  Progress toward goals will be monitored and evaluated per protocol.      Brenda Carrillo RD, MS, LD  SICU: [phone]

## 2019-11-12 NOTE — ANESTHESIA CARE TRANSFER NOTE
Patient: Frandy Workman    Procedure(s):  Exploratory laparotomy, hematoma evacuation, abdominal washout    Diagnosis: Status post liver transplant (H) [Z94.4]  Diagnosis Additional Information: No value filed.    Anesthesia Type:   General     Note:  Airway :Face Mask  Patient transferred to:PACU  Handoff Report: Identifed the Patient, Identified the Reponsible Provider, Reviewed the pertinent medical history, Discussed the surgical course, Reviewed Intra-OP anesthesia mangement and issues during anesthesia, Set expectations for post-procedure period and Allowed opportunity for questions and acknowledgement of understanding      Vitals: (Last set prior to Anesthesia Care Transfer)    CRNA VITALS  11/12/2019 1549 - 11/12/2019 1629      11/12/2019             Resp Rate (observed):  (!) 1                Electronically Signed By: SHANIQUE Wiseman CRNA  November 12, 2019  4:29 PM

## 2019-11-12 NOTE — PROVIDER NOTIFICATION
1000 Hemoglobin 5.7 and platelet count 19, MD notified, awaiting orders for intervention.     1 Unit PRBC's ordered and labs to be redrawn to double check.    Upon recheck at 1030 hemoglobin came back at 5.6 and platelets 20, relayed message to SICU team, patient is receiving 1 unit PRBC's now.    Patient was sent down to the OR with anesthesia at 1300, he had at that point received 1 unit PRBC's with another infusing as he traveled, 1 unit platelets, I called blood bank to send the second unit of platelets to OR, and 1 unit cryo was infusing during travel as well.

## 2019-11-12 NOTE — PROGRESS NOTES
RN received pt belongings from 7A. All are in patient's room on 4A. Belongings include: clothing, jacket, shoes, glasses x2, dentures, wallet, change, and toiletries.    Bailey Watts RN  11/11/2019  10:34 PM

## 2019-11-12 NOTE — PLAN OF CARE
-Neuro: sedated on 0.3 of precedex and 75mcg/hr of fentanyl.  Patient seems to be tolerating precedex much better than propofol. Drowsy, opens eyes to voice. Follows commands inconsistently, moves all extremities. Pupils 2 mm, round, sluggish.  -Cardiac: HR normal sinus rhythm 70-80s. No ectopy noted. MAP goal >65 achieved with levo. Titrating down. Vasopressin titrated off. Also received 750 ml of albumin overnight. PA pressures 20/10s. Afebrile  -Resp: SIMV/PS 30%, , RR 12, PS 7, PEEP 5. Lungs clear, dim in bases.   -GI: NGT to LIS with about 350 ml bile, yellow output overnight. Replacing 1:1 with 0.45 NS. Hypoactive bowel sounds. Abdomen soft  -: gan with adequate urine output  -Pain: fentanyl at 100 mcg/hr. When asked patient has denied pain  -Skin: clamshell incision covered with primapore with scant shadowing.   -Drains: LUANNE x2 to bulb suction. Both with bloody/bright red drainage. L output > R. MD aware  -Access: L radial arterial line. R internal jugular MAC line x2. Redwood catheter at 48, PIV x3  -Gtts: LR at 100, insulin at 2, precedex at 0.3, fentanyl at 75 mcg/hr, vaso OFF, levo at 0.04, heparin at 400  -Labs: Hgb 6.9 (received 1 unit PRBCs), Plt 55, INR 1.47, Fibrinogen 187 (MD aware, no orders to transfuse yet),  K 3.8 (replaced), LFTs trending down.    Plan: wean sedation, monitor labs and vital signs, extubate, pain control    Bailey Watts, RN  11/12/2019  6:34 AM

## 2019-11-12 NOTE — TELEPHONE ENCOUNTER
Organ Offer Encounter Information    Organ Offer Information  Organ offer date & time:  11/10/2019 10:40 AM  Coordinator/Fellow/Attending name:  Jie Richardson RN   Organ(s):   Organ UNOS ID Match Run ID Comment Organ Laterality   Liver [id number]        Recent infections?:  No    New medications?:  No Recent pregnancy?:  No   Angicoagulation medications?:  No Recent vaccinations?:  Yes (Comment: Flu and Pnuemonia 4-6 weeks ago )    Recent hospitalizations?:  No   Has your insurance changed in the last 6-12 months?:  Neg    Discussed organ offer with:  Patient  Discussed risk category with Patient/Other:  KDPI > 85  Understood donor criteria, verbalized understanding  Discussed program-specific outcomes:  Did not have questions regarding SRTR  Organ offer decision status Patient/Other:  Accepted Offer  Organ disposition:  Transplanted  Additional Comments:    11/10/2019 11:40 AM  Liver: [id number]  MD: Jennifer and Richard   OPO Contact: Stacey [phone]  Donor/Recip HCV Status: Hep B core positive  Plan (NPO, Donor OR): NPO since 1100, donor OR being set for afternoon TBD time    Admissions: Hannah in admissions @1206  Unit: 7A [facility] @1135  Update Provider Entering Orders: Richard/Jennifer  Immunology: Anne-Marie Mehta OR: 0600Perri at control desk   Vessel Storage Confirmation: none  Blood Bank: Manjeet  Research: N/A  TransNet/ABO Verification: done  Add Organ: done     Jie Richardson RN on 11/10/2019 at 12:12 PM    Donor OR set for 0200, travel for recovery team  Pending being set up by [facility] at [facility] on site. Plan for intra op liver biopsies to be read on site and slides to be available to return to [facility]. Confirmed wit Dr Rodriguez no CRRT intra-op.  Band and paper work printed to Perri CADET at control desk confirmed and was updated. Jie Richardson RN on 11/10/2019 at 1536    [facility] Ariella updated  transport to  for  recovery team and [facility] ED via Cab at 2345, flight  leaves [place] 0015, Flight number [id number]. Dr Lyons updated, and he will update remainder of team. Jie Richardson RN on 11/10/2019 at 1744                   Attestation I have discussed all of the above with the Patient/Legal Guardian/Caregiver regarding this organ offer.:  Yes  Coordinator/Fellow/Attending name:  Jie Richardson RN

## 2019-11-12 NOTE — PROGRESS NOTES
Transplant Social Work Services Progress Note      Date of Initial Social Work Evaluation: 2/5/19  Collaborated with: Liver Transplant Team    Data: Miller is currently in the OR.  Intervention: Called patient's friend/primary health care agent Davi Saunders and made arrangements to meet with him tomorrow at 10am.  Assessment: Transplant admission psychosocial assessment will be completed tomorrow.  Davi is patient's primary health care agent and one of his identified post transplant care givers.  Education provided by SW: 4A telephone contace  Plan: I will meet with patient and his friend Davi Saunders tomorrow.        ALEXYS Mcnair, [facility]  Liver Transplant   Phone [phone]  Pager [phone]

## 2019-11-12 NOTE — PROCEDURES
Bridle Placement:   Reason for bridle placement: securement of feeding tube  Medicine delivered during procedure: lubricating jelly   Procedure: Successful Unsuccessful  Location of top of clip on FT: @ 91 cm marker   Condition of nose/skin at time of bridle placement: Unremarkable, small red mikel on outside or nare near nostril entrance.  Face to Face time with patient: <5 minutes.    Brenda Carrillo, RD, MS, LD  SICU: [phone]

## 2019-11-13 ENCOUNTER — APPOINTMENT (OUTPATIENT)
Dept: GENERAL RADIOLOGY | Facility: CLINIC | Age: 54
DRG: 005 | End: 2019-11-13
Attending: SURGERY
Payer: MEDICARE

## 2019-11-13 ENCOUNTER — APPOINTMENT (OUTPATIENT)
Dept: PHYSICAL THERAPY | Facility: CLINIC | Age: 54
DRG: 005 | End: 2019-11-13
Attending: SURGERY
Payer: MEDICARE

## 2019-11-13 ENCOUNTER — APPOINTMENT (OUTPATIENT)
Dept: ULTRASOUND IMAGING | Facility: CLINIC | Age: 54
DRG: 005 | End: 2019-11-13
Attending: SURGERY
Payer: MEDICARE

## 2019-11-13 LAB
ABO + RH BLD: NORMAL
ABO + RH BLD: NORMAL
ALBUMIN SERPL-MCNC: 2.7 G/DL (ref 3.4–5)
ALP SERPL-CCNC: 96 U/L (ref 40–150)
ALT SERPL W P-5'-P-CCNC: 103 U/L (ref 0–70)
ANION GAP SERPL CALCULATED.3IONS-SCNC: 7 MMOL/L (ref 3–14)
AST SERPL W P-5'-P-CCNC: 144 U/L (ref 0–45)
BASOPHILS # BLD AUTO: 0 10E9/L (ref 0–0.2)
BASOPHILS NFR BLD AUTO: 0 %
BILIRUB DIRECT SERPL-MCNC: 0.6 MG/DL (ref 0–0.2)
BILIRUB SERPL-MCNC: 1.1 MG/DL (ref 0.2–1.3)
BLD GP AB SCN SERPL QL: NORMAL
BLD PROD TYP BPU: NORMAL
BLD UNIT ID BPU: 0
BLD UNIT ID BPU: 0
BLOOD BANK CMNT PATIENT-IMP: NORMAL
BLOOD PRODUCT CODE: NORMAL
BLOOD PRODUCT CODE: NORMAL
BPU ID: NORMAL
BPU ID: NORMAL
BUN SERPL-MCNC: 32 MG/DL (ref 7–30)
CA-I SERPL ISE-MCNC: 4.9 MG/DL (ref 4.4–5.2)
CALCIUM SERPL-MCNC: 8.3 MG/DL (ref 8.5–10.1)
CHLORIDE SERPL-SCNC: 115 MMOL/L (ref 94–109)
CO2 SERPL-SCNC: 25 MMOL/L (ref 20–32)
CREAT SERPL-MCNC: 1.31 MG/DL (ref 0.66–1.25)
DIFFERENTIAL METHOD BLD: ABNORMAL
EOSINOPHIL # BLD AUTO: 0 10E9/L (ref 0–0.7)
EOSINOPHIL NFR BLD AUTO: 0 %
ERYTHROCYTE [DISTWIDTH] IN BLOOD BY AUTOMATED COUNT: 15.5 % (ref 10–15)
ERYTHROCYTE [DISTWIDTH] IN BLOOD BY AUTOMATED COUNT: 15.7 % (ref 10–15)
ERYTHROCYTE [DISTWIDTH] IN BLOOD BY AUTOMATED COUNT: 15.7 % (ref 10–15)
ERYTHROCYTE [DISTWIDTH] IN BLOOD BY AUTOMATED COUNT: 15.8 % (ref 10–15)
ERYTHROCYTE [DISTWIDTH] IN BLOOD BY AUTOMATED COUNT: 15.9 % (ref 10–15)
FIBRINOGEN PPP-MCNC: 218 MG/DL (ref 200–420)
FIBRINOGEN PPP-MCNC: 222 MG/DL (ref 200–420)
FIBRINOGEN PPP-MCNC: 249 MG/DL (ref 200–420)
FIBRINOGEN PPP-MCNC: 261 MG/DL (ref 200–420)
GFR SERPL CREATININE-BSD FRML MDRD: 61 ML/MIN/{1.73_M2}
GLUCOSE BLDC GLUCOMTR-MCNC: 102 MG/DL (ref 70–99)
GLUCOSE BLDC GLUCOMTR-MCNC: 105 MG/DL (ref 70–99)
GLUCOSE BLDC GLUCOMTR-MCNC: 110 MG/DL (ref 70–99)
GLUCOSE BLDC GLUCOMTR-MCNC: 117 MG/DL (ref 70–99)
GLUCOSE BLDC GLUCOMTR-MCNC: 118 MG/DL (ref 70–99)
GLUCOSE BLDC GLUCOMTR-MCNC: 122 MG/DL (ref 70–99)
GLUCOSE BLDC GLUCOMTR-MCNC: 127 MG/DL (ref 70–99)
GLUCOSE BLDC GLUCOMTR-MCNC: 130 MG/DL (ref 70–99)
GLUCOSE BLDC GLUCOMTR-MCNC: 137 MG/DL (ref 70–99)
GLUCOSE BLDC GLUCOMTR-MCNC: 175 MG/DL (ref 70–99)
GLUCOSE BLDC GLUCOMTR-MCNC: 182 MG/DL (ref 70–99)
GLUCOSE BLDC GLUCOMTR-MCNC: 183 MG/DL (ref 70–99)
GLUCOSE BLDC GLUCOMTR-MCNC: 198 MG/DL (ref 70–99)
GLUCOSE BLDC GLUCOMTR-MCNC: 202 MG/DL (ref 70–99)
GLUCOSE BLDC GLUCOMTR-MCNC: 202 MG/DL (ref 70–99)
GLUCOSE BLDC GLUCOMTR-MCNC: 207 MG/DL (ref 70–99)
GLUCOSE BLDC GLUCOMTR-MCNC: 34 MG/DL (ref 70–99)
GLUCOSE BLDC GLUCOMTR-MCNC: 86 MG/DL (ref 70–99)
GLUCOSE BLDC GLUCOMTR-MCNC: 97 MG/DL (ref 70–99)
GLUCOSE SERPL-MCNC: 128 MG/DL (ref 70–99)
HCT VFR BLD AUTO: 22.8 % (ref 40–53)
HCT VFR BLD AUTO: 24.1 % (ref 40–53)
HCT VFR BLD AUTO: 24.9 % (ref 40–53)
HCT VFR BLD AUTO: 26.1 % (ref 40–53)
HCT VFR BLD AUTO: 28 % (ref 40–53)
HGB BLD-MCNC: 8 G/DL (ref 13.3–17.7)
HGB BLD-MCNC: 8.1 G/DL (ref 13.3–17.7)
HGB BLD-MCNC: 8.5 G/DL (ref 13.3–17.7)
HGB BLD-MCNC: 8.9 G/DL (ref 13.3–17.7)
HGB BLD-MCNC: 9.5 G/DL (ref 13.3–17.7)
IMM GRANULOCYTES # BLD: 0 10E9/L (ref 0–0.4)
IMM GRANULOCYTES NFR BLD: 0.8 %
INR PPP: 1.26 (ref 0.86–1.14)
INR PPP: 1.26 (ref 0.86–1.14)
INR PPP: 1.27 (ref 0.86–1.14)
INR PPP: 1.28 (ref 0.86–1.14)
LYMPHOCYTES # BLD AUTO: 0.2 10E9/L (ref 0.8–5.3)
LYMPHOCYTES NFR BLD AUTO: 6.5 %
MAGNESIUM SERPL-MCNC: 2.1 MG/DL (ref 1.6–2.3)
MCH RBC QN AUTO: 30.6 PG (ref 26.5–33)
MCH RBC QN AUTO: 30.7 PG (ref 26.5–33)
MCH RBC QN AUTO: 31 PG (ref 26.5–33)
MCH RBC QN AUTO: 31.2 PG (ref 26.5–33)
MCH RBC QN AUTO: 31.4 PG (ref 26.5–33)
MCHC RBC AUTO-ENTMCNC: 33.6 G/DL (ref 31.5–36.5)
MCHC RBC AUTO-ENTMCNC: 33.9 G/DL (ref 31.5–36.5)
MCHC RBC AUTO-ENTMCNC: 34.1 G/DL (ref 31.5–36.5)
MCHC RBC AUTO-ENTMCNC: 34.1 G/DL (ref 31.5–36.5)
MCHC RBC AUTO-ENTMCNC: 35.1 G/DL (ref 31.5–36.5)
MCV RBC AUTO: 89 FL (ref 78–100)
MCV RBC AUTO: 90 FL (ref 78–100)
MCV RBC AUTO: 90 FL (ref 78–100)
MCV RBC AUTO: 92 FL (ref 78–100)
MCV RBC AUTO: 93 FL (ref 78–100)
MONOCYTES # BLD AUTO: 0.2 10E9/L (ref 0–1.3)
MONOCYTES NFR BLD AUTO: 5.1 %
NEUTROPHILS # BLD AUTO: 3.1 10E9/L (ref 1.6–8.3)
NEUTROPHILS NFR BLD AUTO: 87.6 %
NRBC # BLD AUTO: 0 10*3/UL
NRBC BLD AUTO-RTO: 0 /100
NUM BPU REQUESTED: 1
NUM BPU REQUESTED: 13
PF4 HEPARIN CMPLX AB SER QL: NEGATIVE
PHOSPHATE SERPL-MCNC: 4.1 MG/DL (ref 2.5–4.5)
PLATELET # BLD AUTO: 43 10E9/L (ref 150–450)
PLATELET # BLD AUTO: 44 10E9/L (ref 150–450)
PLATELET # BLD AUTO: 49 10E9/L (ref 150–450)
PLATELET # BLD AUTO: 52 10E9/L (ref 150–450)
PLATELET # BLD AUTO: 53 10E9/L (ref 150–450)
POTASSIUM SERPL-SCNC: 4 MMOL/L (ref 3.4–5.3)
PROT SERPL-MCNC: 4.8 G/DL (ref 6.8–8.8)
RBC # BLD AUTO: 2.55 10E12/L (ref 4.4–5.9)
RBC # BLD AUTO: 2.6 10E12/L (ref 4.4–5.9)
RBC # BLD AUTO: 2.77 10E12/L (ref 4.4–5.9)
RBC # BLD AUTO: 2.91 10E12/L (ref 4.4–5.9)
RBC # BLD AUTO: 3.06 10E12/L (ref 4.4–5.9)
SODIUM SERPL-SCNC: 147 MMOL/L (ref 133–144)
SPECIMEN EXP DATE BLD: NORMAL
TACROLIMUS BLD-MCNC: <3 UG/L (ref 5–15)
TME LAST DOSE: ABNORMAL H
TRANSFUSION STATUS PATIENT QL: NORMAL
WBC # BLD AUTO: 2.2 10E9/L (ref 4–11)
WBC # BLD AUTO: 3.3 10E9/L (ref 4–11)
WBC # BLD AUTO: 3.4 10E9/L (ref 4–11)
WBC # BLD AUTO: 3.6 10E9/L (ref 4–11)
WBC # BLD AUTO: 3.8 10E9/L (ref 4–11)

## 2019-11-13 PROCEDURE — 85025 COMPLETE CBC W/AUTO DIFF WBC: CPT | Performed by: SURGERY

## 2019-11-13 PROCEDURE — 40000986 XR CHEST PORT 1 VW

## 2019-11-13 PROCEDURE — 83735 ASSAY OF MAGNESIUM: CPT | Performed by: SURGERY

## 2019-11-13 PROCEDURE — 85610 PROTHROMBIN TIME: CPT | Performed by: PHYSICIAN ASSISTANT

## 2019-11-13 PROCEDURE — 27210429 ZZH NUTRITION PRODUCT INTERMEDIATE LITER

## 2019-11-13 PROCEDURE — 97530 THERAPEUTIC ACTIVITIES: CPT | Mod: GP

## 2019-11-13 PROCEDURE — 25000128 H RX IP 250 OP 636: Performed by: NURSE PRACTITIONER

## 2019-11-13 PROCEDURE — 25000128 H RX IP 250 OP 636: Performed by: STUDENT IN AN ORGANIZED HEALTH CARE EDUCATION/TRAINING PROGRAM

## 2019-11-13 PROCEDURE — 25000132 ZZH RX MED GY IP 250 OP 250 PS 637: Mod: GY | Performed by: STUDENT IN AN ORGANIZED HEALTH CARE EDUCATION/TRAINING PROGRAM

## 2019-11-13 PROCEDURE — 25000128 H RX IP 250 OP 636: Performed by: SURGERY

## 2019-11-13 PROCEDURE — 82330 ASSAY OF CALCIUM: CPT | Performed by: SURGERY

## 2019-11-13 PROCEDURE — 99231 SBSQ HOSP IP/OBS SF/LOW 25: CPT | Mod: GC | Performed by: SURGERY

## 2019-11-13 PROCEDURE — 20000004 ZZH R&B ICU UMMC

## 2019-11-13 PROCEDURE — 00000146 ZZHCL STATISTIC GLUCOSE BY METER IP

## 2019-11-13 PROCEDURE — P9016 RBC LEUKOCYTES REDUCED: HCPCS | Performed by: STUDENT IN AN ORGANIZED HEALTH CARE EDUCATION/TRAINING PROGRAM

## 2019-11-13 PROCEDURE — 25800025 ZZH RX 258: Performed by: STUDENT IN AN ORGANIZED HEALTH CARE EDUCATION/TRAINING PROGRAM

## 2019-11-13 PROCEDURE — P9037 PLATE PHERES LEUKOREDU IRRAD: HCPCS | Performed by: STUDENT IN AN ORGANIZED HEALTH CARE EDUCATION/TRAINING PROGRAM

## 2019-11-13 PROCEDURE — 85384 FIBRINOGEN ACTIVITY: CPT | Performed by: PHYSICIAN ASSISTANT

## 2019-11-13 PROCEDURE — 85027 COMPLETE CBC AUTOMATED: CPT | Performed by: PHYSICIAN ASSISTANT

## 2019-11-13 PROCEDURE — 25000131 ZZH RX MED GY IP 250 OP 636 PS 637: Mod: GY | Performed by: NURSE PRACTITIONER

## 2019-11-13 PROCEDURE — 93975 VASCULAR STUDY: CPT | Mod: TC

## 2019-11-13 PROCEDURE — 97161 PT EVAL LOW COMPLEX 20 MIN: CPT | Mod: GP

## 2019-11-13 PROCEDURE — 25000132 ZZH RX MED GY IP 250 OP 250 PS 637: Mod: GY | Performed by: NURSE PRACTITIONER

## 2019-11-13 PROCEDURE — 86923 COMPATIBILITY TEST ELECTRIC: CPT | Performed by: STUDENT IN AN ORGANIZED HEALTH CARE EDUCATION/TRAINING PROGRAM

## 2019-11-13 PROCEDURE — 86900 BLOOD TYPING SEROLOGIC ABO: CPT | Performed by: STUDENT IN AN ORGANIZED HEALTH CARE EDUCATION/TRAINING PROGRAM

## 2019-11-13 PROCEDURE — 25000132 ZZH RX MED GY IP 250 OP 250 PS 637: Mod: GY

## 2019-11-13 PROCEDURE — 25000125 ZZHC RX 250: Performed by: SURGERY

## 2019-11-13 PROCEDURE — 84100 ASSAY OF PHOSPHORUS: CPT | Performed by: SURGERY

## 2019-11-13 PROCEDURE — 80076 HEPATIC FUNCTION PANEL: CPT | Performed by: SURGERY

## 2019-11-13 PROCEDURE — 80048 BASIC METABOLIC PNL TOTAL CA: CPT | Performed by: SURGERY

## 2019-11-13 PROCEDURE — 86850 RBC ANTIBODY SCREEN: CPT | Performed by: STUDENT IN AN ORGANIZED HEALTH CARE EDUCATION/TRAINING PROGRAM

## 2019-11-13 PROCEDURE — 25000132 ZZH RX MED GY IP 250 OP 250 PS 637: Mod: GY | Performed by: SURGERY

## 2019-11-13 PROCEDURE — 97116 GAIT TRAINING THERAPY: CPT | Mod: GP

## 2019-11-13 PROCEDURE — 80197 ASSAY OF TACROLIMUS: CPT | Performed by: NURSE PRACTITIONER

## 2019-11-13 PROCEDURE — 25800030 ZZH RX IP 258 OP 636

## 2019-11-13 PROCEDURE — 86901 BLOOD TYPING SEROLOGIC RH(D): CPT | Performed by: STUDENT IN AN ORGANIZED HEALTH CARE EDUCATION/TRAINING PROGRAM

## 2019-11-13 PROCEDURE — 25000131 ZZH RX MED GY IP 250 OP 636 PS 637: Mod: GY | Performed by: SURGERY

## 2019-11-13 RX ORDER — CHOLECALCIFEROL (VITAMIN D3) 50 MCG
1 TABLET ORAL DAILY
Status: ON HOLD | COMMUNITY
End: 2019-12-17

## 2019-11-13 RX ORDER — OXYCODONE HYDROCHLORIDE 5 MG/1
10 TABLET ORAL EVERY 4 HOURS PRN
Status: DISCONTINUED | OUTPATIENT
Start: 2019-11-13 | End: 2019-11-14

## 2019-11-13 RX ORDER — OMEPRAZOLE
40 KIT
Status: DISCONTINUED | OUTPATIENT
Start: 2019-11-13 | End: 2019-11-18

## 2019-11-13 RX ORDER — PIPERACILLIN SODIUM, TAZOBACTAM SODIUM 3; .375 G/15ML; G/15ML
3.38 INJECTION, POWDER, LYOPHILIZED, FOR SOLUTION INTRAVENOUS EVERY 6 HOURS
Status: COMPLETED | OUTPATIENT
Start: 2019-11-13 | End: 2019-11-18

## 2019-11-13 RX ORDER — TENOFOVIR DISOPROXIL FUMARATE 300 MG/1
300 TABLET, FILM COATED ORAL DAILY
Status: DISCONTINUED | OUTPATIENT
Start: 2019-11-13 | End: 2019-11-20 | Stop reason: HOSPADM

## 2019-11-13 RX ORDER — TACROLIMUS 0.5 MG/1
3 CAPSULE, GELATIN COATED ORAL
Status: DISCONTINUED | OUTPATIENT
Start: 2019-11-13 | End: 2019-11-14

## 2019-11-13 RX ORDER — AMINO AC/PROTEIN HYDR/WHEY PRO 10G-100/30
2 LIQUID (ML) ORAL DAILY
Status: DISCONTINUED | OUTPATIENT
Start: 2019-11-13 | End: 2019-11-19

## 2019-11-13 RX ORDER — OXYCODONE HYDROCHLORIDE 5 MG/1
5-10 TABLET ORAL
Status: DISCONTINUED | OUTPATIENT
Start: 2019-11-13 | End: 2019-11-13

## 2019-11-13 RX ADMIN — CARVEDILOL 12.5 MG: 12.5 TABLET, FILM COATED ORAL at 17:39

## 2019-11-13 RX ADMIN — HUMAN INSULIN 2 UNITS/HR: 100 INJECTION, SOLUTION SUBCUTANEOUS at 13:42

## 2019-11-13 RX ADMIN — Medication 2 MG: at 07:33

## 2019-11-13 RX ADMIN — SODIUM CHLORIDE, POTASSIUM CHLORIDE, SODIUM LACTATE AND CALCIUM CHLORIDE: 600; 310; 30; 20 INJECTION, SOLUTION INTRAVENOUS at 23:08

## 2019-11-13 RX ADMIN — POLYETHYLENE GLYCOL 3350 17 G: 17 POWDER, FOR SOLUTION ORAL at 07:31

## 2019-11-13 RX ADMIN — HYDROMORPHONE HYDROCHLORIDE 0.5 MG: 1 INJECTION, SOLUTION INTRAMUSCULAR; INTRAVENOUS; SUBCUTANEOUS at 08:43

## 2019-11-13 RX ADMIN — HYDROMORPHONE HYDROCHLORIDE 0.5 MG: 1 INJECTION, SOLUTION INTRAMUSCULAR; INTRAVENOUS; SUBCUTANEOUS at 14:52

## 2019-11-13 RX ADMIN — MYCOPHENOLATE MOFETIL 750 MG: 200 POWDER, FOR SUSPENSION ORAL at 17:40

## 2019-11-13 RX ADMIN — DEXTROSE MONOHYDRATE: 100 INJECTION, SOLUTION INTRAVENOUS at 09:58

## 2019-11-13 RX ADMIN — PIPERACILLIN SODIUM AND TAZOBACTAM SODIUM 3.38 G: 3; .375 INJECTION, POWDER, LYOPHILIZED, FOR SOLUTION INTRAVENOUS at 04:23

## 2019-11-13 RX ADMIN — OXYCODONE HYDROCHLORIDE 10 MG: 5 TABLET ORAL at 22:05

## 2019-11-13 RX ADMIN — OXYCODONE HYDROCHLORIDE 10 MG: 5 TABLET ORAL at 12:20

## 2019-11-13 RX ADMIN — METHYLPREDNISOLONE SODIUM SUCCINATE 100 MG: 125 INJECTION, POWDER, FOR SOLUTION INTRAMUSCULAR; INTRAVENOUS at 07:31

## 2019-11-13 RX ADMIN — CARVEDILOL 12.5 MG: 12.5 TABLET, FILM COATED ORAL at 07:31

## 2019-11-13 RX ADMIN — POLYETHYLENE GLYCOL 3350 17 G: 17 POWDER, FOR SOLUTION ORAL at 20:42

## 2019-11-13 RX ADMIN — MULTIVITAMIN 15 ML: LIQUID ORAL at 11:05

## 2019-11-13 RX ADMIN — SULFAMETHOXAZOLE AND TRIMETHOPRIM 1 TABLET: 400; 80 TABLET ORAL at 07:31

## 2019-11-13 RX ADMIN — HUMAN INSULIN 6 UNITS/HR: 100 INJECTION, SOLUTION SUBCUTANEOUS at 15:36

## 2019-11-13 RX ADMIN — SENNOSIDES AND DOCUSATE SODIUM 1 TABLET: 8.6; 5 TABLET ORAL at 20:42

## 2019-11-13 RX ADMIN — OMEPRAZOLE 40 MG: KIT at 09:55

## 2019-11-13 RX ADMIN — PIPERACILLIN SODIUM AND TAZOBACTAM SODIUM 3.38 G: 3; .375 INJECTION, POWDER, LYOPHILIZED, FOR SOLUTION INTRAVENOUS at 09:55

## 2019-11-13 RX ADMIN — MYCOPHENOLATE MOFETIL 750 MG: 200 POWDER, FOR SUSPENSION ORAL at 07:33

## 2019-11-13 RX ADMIN — OXYCODONE HYDROCHLORIDE 5 MG: 5 TABLET ORAL at 04:20

## 2019-11-13 RX ADMIN — HUMAN INSULIN 1 UNITS/HR: 100 INJECTION, SOLUTION SUBCUTANEOUS at 21:53

## 2019-11-13 RX ADMIN — Medication 3 MG: at 17:40

## 2019-11-13 RX ADMIN — OXYCODONE HYDROCHLORIDE 5 MG: 5 TABLET ORAL at 07:32

## 2019-11-13 RX ADMIN — HYDROMORPHONE HYDROCHLORIDE 0.5 MG: 1 INJECTION, SOLUTION INTRAMUSCULAR; INTRAVENOUS; SUBCUTANEOUS at 18:40

## 2019-11-13 RX ADMIN — TRAZODONE HYDROCHLORIDE 50 MG: 50 TABLET ORAL at 22:05

## 2019-11-13 RX ADMIN — TENOFOVIR DISOPROXIL FUMARATE 300 MG: 300 TABLET ORAL at 12:17

## 2019-11-13 RX ADMIN — NYSTATIN 1000000 UNITS: 500000 SUSPENSION ORAL at 07:32

## 2019-11-13 RX ADMIN — VALGANCICLOVIR HYDROCHLORIDE 450 MG: 50 POWDER, FOR SOLUTION ORAL at 07:33

## 2019-11-13 RX ADMIN — OLANZAPINE 2.5 MG: 2.5 TABLET, FILM COATED ORAL at 22:07

## 2019-11-13 RX ADMIN — SENNOSIDES AND DOCUSATE SODIUM 2 TABLET: 8.6; 5 TABLET ORAL at 07:32

## 2019-11-13 RX ADMIN — SODIUM CHLORIDE, POTASSIUM CHLORIDE, SODIUM LACTATE AND CALCIUM CHLORIDE: 600; 310; 30; 20 INJECTION, SOLUTION INTRAVENOUS at 16:34

## 2019-11-13 RX ADMIN — OXYCODONE HYDROCHLORIDE 10 MG: 5 TABLET ORAL at 17:39

## 2019-11-13 RX ADMIN — PIPERACILLIN SODIUM AND TAZOBACTAM SODIUM 3.38 G: 3; .375 INJECTION, POWDER, LYOPHILIZED, FOR SOLUTION INTRAVENOUS at 15:33

## 2019-11-13 RX ADMIN — SODIUM CHLORIDE, POTASSIUM CHLORIDE, SODIUM LACTATE AND CALCIUM CHLORIDE: 600; 310; 30; 20 INJECTION, SOLUTION INTRAVENOUS at 08:43

## 2019-11-13 RX ADMIN — HYDROMORPHONE HYDROCHLORIDE 0.5 MG: 1 INJECTION, SOLUTION INTRAMUSCULAR; INTRAVENOUS; SUBCUTANEOUS at 02:01

## 2019-11-13 RX ADMIN — HYDROMORPHONE HYDROCHLORIDE 0.5 MG: 1 INJECTION, SOLUTION INTRAMUSCULAR; INTRAVENOUS; SUBCUTANEOUS at 04:18

## 2019-11-13 RX ADMIN — DEXTROSE 50 % IN WATER (D50W) INTRAVENOUS SYRINGE 50 ML: at 09:58

## 2019-11-13 RX ADMIN — NYSTATIN 1000000 UNITS: 500000 SUSPENSION ORAL at 20:42

## 2019-11-13 RX ADMIN — Medication 2 PACKET: at 11:44

## 2019-11-13 RX ADMIN — PIPERACILLIN SODIUM AND TAZOBACTAM SODIUM 3.38 G: 3; .375 INJECTION, POWDER, LYOPHILIZED, FOR SOLUTION INTRAVENOUS at 22:05

## 2019-11-13 ASSESSMENT — PAIN DESCRIPTION - DESCRIPTORS
DESCRIPTORS: ACHING

## 2019-11-13 ASSESSMENT — ACTIVITIES OF DAILY LIVING (ADL)
ADLS_ACUITY_SCORE: 13
ADLS_ACUITY_SCORE: 14
ADLS_ACUITY_SCORE: 13
ADLS_ACUITY_SCORE: 13
ADLS_ACUITY_SCORE: 14
ADLS_ACUITY_SCORE: 14

## 2019-11-13 NOTE — PHARMACY-ADMISSION MEDICATION HISTORY
"Admission medication history interview status for the 11/10/2019 admission is complete. See Epic admission navigator for allergy information, pharmacy, prior to admission medications and immunization status.     Medication history interview sources:  Patient, Chart Review, Care Everywhere, and Dispense Records    Changes made to PTA medication list (reason)  Added: None  Deleted: Diclofenac 1% topical gel (Per patient, not using this medication)  Olanzapine 2.5 mg tablet (Per patient, this was replaced with trazodone)  Changed: None    Additional medication history information: Patient was a reliable historian.  Of note:    Patient stated that his insulin degludec is more of a \"flexible\" scale, and most recently has been injecting 60 units daily.    Patient was unaware of his directions for the insulin aspart when asked about his sliding scale.    Patient also has Afilbercept (Eylea) injections every 28 days and believes that he would be due for his next injection on 11/14/19    Prior to Admission medications    Medication Sig Last Dose Taking? Auth Provider   alpha-lipoic acid 600 MG capsule Take 1 capsule (600 mg) by mouth daily 11/9/2019 at 1700 Yes Jennifer Wilcox MD   Artificial Tear Solution (SM ARTIFICIAL TEARS) SOLN Place 1 drop into the right eye every hour as needed Apply at least 4 times daily and as needed for dry eye 11/9/2019 at Unknown time Yes Milana Malave MD   aspirin (ASA) 81 MG EC tablet Take 1 tablet (81 mg) by mouth daily 11/10/2019 at 1030time Yes Brenda Delgadillo MD   BD VIKTORIA U/F 32G X 4 MM insulin pen needle Use 5 per day 11/9/2019 at Unknown time Yes Jennifer Wilcox MD   carvedilol (COREG) 12.5 MG tablet TAKE 1 TABLET(12.5 MG) BY MOUTH TWICE DAILY WITH MEALS 11/10/2019 at 1030 Yes Natalie Chun MD   Cyanocobalamin 5000 MCG TBDP Take 5,000 mcg by mouth daily 11/10/2019 at 1030 Yes Unknown, Entered By History   dapagliflozin (FARXIGA) 10 " "MG TABS tablet Take 1 tablet (10 mg) by mouth daily 11/10/2019 at 1030 Yes Jennifer Wilcox MD   econazole nitrate 1 % external cream Apply topically daily To feet and toenails. 11/9/2019 at Unknown time Yes Lamin Gonzalez DPM   ferrous sulfate (FEROSUL) 325 (65 Fe) MG tablet Take 1 tablet (325 mg) by mouth 3 times daily (with meals) 11/10/2019 at 1030 Yes Santi Rosas MD   insulin degludec (TRESIBA) 200 UNIT/ML pen Take 70 units daily.  Patient taking differently: Take 70 units daily. Patient reports its a \"flexible\" scale and is currently injecting 60 units daily. 11/10/2019 at 1030 Yes Jennifer Wilcox MD   lactulose (CHRONULAC) 10 GM/15ML solution Take 45 mLs (30 g) by mouth 4 times daily Past Month at Unknown time Yes Santi Rosas MD   metFORMIN (GLUCOPHAGE-XR) 500 MG 24 hr tablet Take 1 tablet (500 mg) by mouth daily (with breakfast) 11/10/2019 at 1730 Yes Jennifer Wilcox MD   Multiple Vitamin (THERAVITE PO) Take 1 tablet by mouth every morning  11/10/2019 at 1030 Yes Reported, Patient   NOVOLOG FLEXPEN 100 UNIT/ML soln INJECT 1 UNIT PER 4 GRAMS OF CARBS AT MEALS AND SNACKS. CORRECTION SCALE OF 1 UNITS PER 25 [address] DOSE 75 UNITERS PER DAY 11/10/2019 at Unknown time Yes Natalie Chun MD   omeprazole (PRILOSEC) 40 MG DR capsule Take 1 capsule (40 mg) by mouth daily 11/10/2019 at 1030 Yes Natalie Chun MD   potassium chloride ER (K-DUR/KLOR-CON M) 20 MEQ CR tablet Take 1 tablet (20 mEq) by mouth daily 11/10/2019 at 1030 Yes Natalie Chun MD   rosuvastatin (CRESTOR) 20 MG tablet Take 1 tablet (20 mg) by mouth daily 11/9/2019 at 1730 Yes Santi Rosas MD   tamsulosin (FLOMAX) 0.4 MG capsule TAKE 1 CAPSULE(0.4 MG) BY MOUTH DAILY 11/9/2019 at 1730 Yes Natalie Chun MD   traZODone (DESYREL) 50 MG tablet Take 1 tablet (50 mg) by mouth At Bedtime 11/9/2019 at 2145 Yes Andrés " Natalie Russell MD   vitamin D3 (CHOLECALCIFEROL) 2000 units (50 mcg) tablet Take 1 tablet by mouth daily 11/10/2019 at 1030 Yes Unknown, Entered By History   XIFAXAN 550 MG TABS tablet TAKE 1 TABLET(550 MG) BY MOUTH TWICE DAILY 11/3/2019 at 1030 Yes Santi Rosas MD   blood glucose monitoring (ACCU-CHEK EDINSON PLUS) test strip Use to test blood sugar 4 times daily   Natalie Chun MD   blood glucose monitoring (ACCU-CHEK FASTCLIX) lancets Use to test blood sugar 4 times daily or as directed.  1 box = 102 lancets   Natalie Chun MD       Medication history completed by:  Tawny Odom  Pharm D Candidate  November 13, 2019

## 2019-11-13 NOTE — PROVIDER NOTIFICATION
SICU notified of BG =34, pt asymptomatic. 50 mL D50 given and D10 infusion initiated at 40 mL/hr. Trickle feeds ordered to start. Will repeat BG frequently.

## 2019-11-13 NOTE — PROGRESS NOTES
Transplant Surgery  Inpatient Daily Progress Note  11/13/2019    Assessment & Plan: Frandy Workman is a 54 year old male with a past medical history of cirrhosis secondary to ESTRADA diagnosed in 2013, HCC 2018, HTN, HLD, GERD, BPH, and DM2 who underwent a DBD liver transplant on 11/11/19 with Dr. Ramos.  He returned to OR POD1 for ex lap, hematoma evacuation, and washout.     Graft function: Patient returned to OR yesterday for washout. LFTs continue to trend down POD 2. Tbili 1.1 (from 1.8 yesterday). INR 1.26.  POD1 U/S demonstrating patent vessels.  JPs with sanguinous output, L>R.   Immunosuppression management:   Induction: Solumedrol taper per protocol  MMF: 750mg BID  FK:  2mg BID; tac level pending (goal 10-12)  Complexity of management:Medium. Contributing factors: thrombocytopenia, anemia and induction  Hematology:   Acute blood loss anemia: Hgb as low as 5.6 yesterday prior to washout; received 4 units PRBCs.  Hgb this morning 8.9 (from 8.5).  Stable.  Thrombocytopenia: Plt as low as 19 yesterday; 4 units platelets transfused.  Plt today 52.  HIT panel pending; low suspicion.  Hypofibrinogenemia: <200; 5 units cryoprecipitate given early post-op. Today fibrinogen 222.   Cardiorespiratory: Extubated yesterday after OR; now on RA.    Hypertension: Restarted PTA Coreg 12.5mg BID.  PRN hydralazine available but pt is tachycardic. Consider labetalol; possibly rebound tachycardia.   GI/Nutrition: NGT to LCS overnight; removed today. NJ in place; plan to start TF today at 10mL/hr (goal 60mL/hr).  Bowel regimen ordered.  Endocrine:   DM2 with steroid-induced hyperglycemia: BG ; insulin drip titrated per nursing.  Fluid/Electrolytes: Electrolytes replaced per ICU protocol.  Hypernatremia: Na 147; MIVF increased to 150mL/hr.    : Carroll to be removed today.  Infectious disease: Tmax 99.1. Continue zosyn x48 hours. Donor was Hep B core positive; tenofovir initiated.   Neuro: Patient alert; off sedation.  "Oxycodone and IV dilaudid for abdominal pain.  Prophylaxis: DVT (SCDs), GI (protonix), fall, fungal (Nystatin), viral (Valcyte), pneumocystis (Bactrim)  Disposition: SICU; probable transfer to  tomorrow    Medical Decision Making: High  Subsequent visit 85648 (high level decision making)    SERA/Fellow/Resident Provider: Mellisa Nelson NP [id number]    Faculty: Uma Moreno M.D.    __________________________________________________________________  Transplant History:    11/11/2019 (Liver), Postoperative day: 2     Interval History: History obtained from patient.  Overnight events: No acute events overnight.  Patient seen in SICU; extubated and off sedation.  He is alert and follows commands. Denies chest pain, SOB, fever, nausea, vomiting.  His biggest complaint is feeling like \"an animal in the zoo\" with the large ICU teams.  He reports current pain regimen is adequate; pain is worst with repositioning.     ROS:   A 10-point review of systems was negative except as noted above.    Curent Meds:    alpha-lipoic acid  600 mg Oral Daily     carvedilol  12.5 mg Oral BID w/meals     [START ON 11/14/2019] methylPREDNISolone  50 mg Intravenous Once    Followed by     [START ON 11/15/2019] predniSONE  25 mg Oral Once    Followed by     [START ON 11/16/2019] predniSONE  10 mg Oral Once     mycophenolate  750 mg Oral BID IS    Or     mycophenolate  750 mg Oral or NG Tube BID IS     nystatin  10 mL Mouth/Throat 4x Daily     OLANZapine  2.5 mg Oral At Bedtime     omeprazole  40 mg Oral or Feeding Tube QAM AC     piperacillin-tazobactam  3.375 g Intravenous Q6H     polyethylene glycol  17 g Oral BID     senna-docusate  1 tablet Oral BID    Or     senna-docusate  2 tablet Oral BID     sodium chloride (PF)  3 mL Intravenous Q8H     sulfamethoxazole-trimethoprim  1 tablet Oral Daily     tacrolimus  2 mg Oral BID IS    Or     tacrolimus  2 mg Oral or NG Tube BID IS     traZODone  50 mg Oral At Bedtime     valGANciclovir  450 mg Oral " Daily    Or     valGANciclovir  450 mg Oral or NG Tube Daily       Physical Exam:     Admit Weight: 79.4 kg (175 lb 1.6 oz)    Current Vitals:   /76   Pulse 94   Temp 98.5  F (36.9  C) (Oral)   Resp 14   Wt 85.4 kg (188 lb 4.4 oz)   SpO2 91%   BMI 27.80 kg/m      Vital sign ranges:    Temp:  [96.8  F (36  C)-99.1  F (37.3  C)] 98.5  F (36.9  C)  Pulse:  [90-94] 94  Heart Rate:  [] 114  Resp:  [8-20] 14  BP: (131-161)/(73-89) 135/76  MAP:  [54 mmHg-118 mmHg] 112 mmHg  Arterial Line BP: ()/(32-78) 165/71  SpO2:  [91 %-100 %] 91 %    General Appearance: in no apparent distress.   Skin: normal, warm, dry  Heart: sinus tachycardia; well perfused  Lungs: non-labored on RA  Abdomen: operative dressing in place; marked and not extending borders. Left and right JPs with sanguinous output.  : Carroll in place; patent.  Extremities: edema: trace noted in BLE.  Neurologic: Alert; follows commands. Pupils 2mm.    Frailty Scores     There is no flowsheet data to display.          Data:   CMP  Recent Labs   Lab 11/13/19  0407 11/12/19  1645 11/12/19  1513 11/12/19  1400 11/12/19  0346  11/10/19  1714   * 145* 144 145* 145*   < > 140   POTASSIUM 4.0 3.9 4.2 4.2 3.8   < > 4.3   CHLORIDE 115* 114*  --   --  114*   < > 112*   CO2 25 25  --   --  26   < > 24   * 157* 156* 147* 123*   < > 154*   BUN 32* 29  --   --  24   < > 18   CR 1.31* 1.25  --   --  1.22   < > 1.18   GFRESTIMATED 61 64  --   --  66   < > 69   GFRESTBLACK 70 74  --   --  76   < > 80   DEBORAH 8.3* 8.2*  --   --  9.1   < > 9.3   ICAW  --   --  4.8 4.9 5.1   < >  --    MAG 2.1  --   --   --  2.2   < > 2.1   PHOS 4.1  --   --   --  3.5   < > 2.5   AMYLASE  --   --   --   --  26*  --  41   LIPASE  --   --   --   --  32*  --   --    ALBUMIN 2.7*  --   --   --  2.4*   < > 2.8*   BILITOTAL 1.1  --   --   --  1.8*   < > 1.4*   ALKPHOS 96  --   --   --  64   < > 180*   *  --   --   --  222*   < > 64*   *  --   --   --  111*   < >  64    < > = values in this interval not displayed.     CBC  Recent Labs   Lab 11/13/19  0653 11/13/19  0407   HGB 8.9* 8.5*   WBC 3.4* 3.6*   PLT 52* 49*

## 2019-11-13 NOTE — PLAN OF CARE
Neuro: A&Ox4. Pupils equal/reactive, follows commands. Pain controlled with prn oxycodone and dilaudid.   Cardiac: NSR-ST. Goal SBP <170. Restarted home coreg dose. No prn medications given. Pulses palpable, denies any CP/dizziness or lightheadedness. Q 6 hr labs. Goals: hgb >8, plt >44, INR <2, fib >200.   Resp: Needs 1L oxymask while sleeping, otherwise sats mid 90s on RA. Lung sounds clear/diminished. Nonproductive cough. Initial IS instructions provided, continue to reinforce importance of pulmonary toilet.   GI: Continue with bowel regimen. Hypoactive bowel sounds. Restarted TF via NGT, advance to goal as ordered.   : Carroll removed at 1000, has voided x1 post removal.   Skin: Clamshell incision dry and intact, strike-through marked. Turn q2 hrs to maintain skin integrity. Isolibrium mattress. Pillows between bony prominences. Heels floated.   Musc: PT/OT following. Up walking in hallway this afternoon with aid of walker and gait belt. Tolerated fairly well. OOB in chair for 1 hour. Continue to increase activity as pt tolerates.   LADs: R TL internal jugular. L PIV x2. L abdominal LUANNE. R abdominal LUANNE. LR @ 150 mL/hr. Insulin gtt @ 6 units/hr on Algorithm 2. TKO + PGBK.  ID: Continue with IV abx as ordered.     Plan: Monitor in ICU overnight, possible transfer to floor tomorrow if no acute events overnight.

## 2019-11-13 NOTE — PROGRESS NOTES
SURGICAL ICU PROGRESS NOTE  November 13, 2019      ASSESSMENT: Frandy Workman is a 54M with PMHx significant for cirrhosis 2/2 ESTRADA diagnosed in 2013, HCC 2018, HTN, HLD, GERD, BPH and DMII who is admitted to the SICU s/p DDLT on 11/11/19 with Dr. Ramos  and exploratory laparotomy 11/12/2019.    Overnight: 3U plts, 1U pRBC    CHANGES TODAY:  Discontinue gan  Discontinue Arterial line  Discontinue PA catheter  Discontinue MAC  Re-wire MAC  Resume PTA coreg  Increase mIVF to 150ml/hr to correct hyperNa of 147  Trickle TF, follow-up with transplant re NG vs NJ  Platelet goal incr to <50  Oxy prn increase 10mg q4hr  Protonix suspension   Follow-up transplant re chemo ppx         PLAN:   Neuro/ pain/ sedation:  #Acute postop pain  - Monitor neurological status. Notify the MD for any acute changes in exam.  - Oxy q3 prn, dilaudid q2 IV prn for pain control      Pulmonary care:   -Supplemental oxygen to keep saturation above 92 %.  - Encourage incentive spirometry      Cardiovascular:    #HTN  #HLD  #CAD  #Acute blood loss anemia  - Monitor hemodynamic status.   - PTA carvedilol   - Hydralazine prn for uncontrolled hypertension      GI care:   #ESTRADA  #HCC s/p DDLT  #GERD  - Plan to begin trickle tube feeds today  -- will follow-up with transplant regarding NG vs NJ  - Bowel regimen with senokot and miralax       Fluids/ Electrolytes/ Nutrition:   #hypernatremia  - Free water deficit of 2.6L, will correct with mIVF  - LR @ 150 ml/hr for IV fluid hydration  - ICU electrolyte replacement protocol  - No indication for parenteral nutrition.  - Nutrition consulted. Appreciate recs     Renal/ Fluid Balance:    #BPH  - Urine output has been adequate so far  - Will continue to monitor intake and output.  - discontinue gan      Endocrine:    #DMII  #steroid induced hyperglycemia  - Insulin gtt for BG goals < 180       ID/ Antibiotics:  #transplant immunosuppression   - Zosyn 3.375g IV q6hr  - Bactrim/Septra qDay  -  Valcyte 900mg qDay  - Methylprednisolone taper   - Tacrolimus   - Nystatin suspension   - Mycophenolate       Heme:     #Portal vein thrombosis  #Acute blood loss anemia  #HIT  - HIT panel pending  - No plans for chemoprophylaxis as concern for ongoing bleeding  - Post op: hgb 9.2 plt 57 INR 1.60 fibrinogen 192   -- Transfusion goals: hgb < 8.0; plts < 30; fibrinogen > 200; INR < 2.0      Prophylaxis:    - Mechanical prophylaxis for DVT.   - No chemoprophylaxis 2/2 concern for bleeding risk  - No indication for GI prophylaxis      MSK:    - PT and OT consulted. Appreciate recs.      Lines/ tubes/ drains:  - PIV, NG, RIJ MAC, LUANNE     Patient seen, findings and plan discussed during rounds with SICU staff Dr. Elda Kelly MD  Anesthesiology PGY1  [phone]       ====================================    TODAY'S PROGRESS:   SUBJECTIVE:   Patient received 3U plts and 1U PRBC overnight to meet transfusion goals. Otherwise, no acute events overnight. Pain well managed. Denies CP, SOB, ab pain, calf pain/tenderness, N/V.    OBJECTIVE:   1. VITAL SIGNS:   Temp:  [96.8  F (36  C)-99.1  F (37.3  C)] 98.4  F (36.9  C)  Pulse:  [90-94] 94  Heart Rate:  [] 110  Resp:  [8-20] 14  BP: (131-161)/(73-89) 135/76  MAP:  [54 mmHg-118 mmHg] 105 mmHg  Arterial Line BP: ()/(29-78) 157/64  FiO2 (%):  [30 %] 30 %  SpO2:  [91 %-100 %] 93 %  Ventilation Mode: SIMV/PS  (Synchronized Intermittent Mandatory Ventilation with Pressure Support)  FiO2 (%): 30 %  Rate Set (breaths/minute): 12 breaths/min  Tidal Volume Set (mL): 500 mL  PEEP (cm H2O): 5 cmH2O  Pressure Support (cm H2O): 7 cmH2O  Oxygen Concentration (%): 30 %  Resp: 14      2. INTAKE/ OUTPUT:   I/O last 3 completed shifts:  In: 7091.44 [I.V.:4347.44; NG/GT:190]  Out: 4032 [Urine:1845; Emesis/NG output:850; Drains:1237; Blood:100]    3. PHYSICAL EXAMINATION:   General: Pleasant man sitting comfortably in bed  Neuro: A&Ox3, NAD  Resp: Breathing non-labored. LCATB  CV:  Tachy. No m/g/r  Abdomen: Soft obese, Non-distended, Non-tender.   Incisions: c/d/i. Mild serosang oozing from dressings. No signs of dehisence, erythema, hematoma  Extremities: warm and well perfused. Moves extremities spontaneously. Dp/pt palpable b/l    4. INVESTIGATIONS:   Arterial Blood Gases   Recent Labs   Lab 11/12/19  1513 11/12/19  1400 11/11/19  1651 11/11/19  1600   PH 7.37 7.36 7.41 7.48*   PCO2 42 43 42 36   PO2 114* 92 81 98   HCO3 24 24 27 26     Complete Blood Count   Recent Labs   Lab 11/13/19 0407 11/13/19  0135 11/12/19  1850 11/12/19  1645   WBC 3.6* 3.8* 3.2* 2.6*   HGB 8.5* 8.0* 8.8* 9.0*   PLT 49* 53* 26* 26*     Basic Metabolic Panel  Recent Labs   Lab 11/13/19  0407 11/12/19  1645 11/12/19  1513 11/12/19  1400 11/12/19  0346 11/11/19  2213   * 145* 144 145* 145* 143   POTASSIUM 4.0 3.9 4.2 4.2 3.8 4.2   CHLORIDE 115* 114*  --   --  114* 113*   CO2 25 25  --   --  26 26   BUN 32* 29  --   --  24 23   CR 1.31* 1.25  --   --  1.22 1.18   * 157* 156* 147* 123* 212*     Liver Function Tests  Recent Labs   Lab 11/13/19  0407 11/13/19  0135 11/12/19  1945 11/12/19  1850 11/12/19  1645  11/12/19  0346  11/11/19  1651  11/10/19  1714   *  --   --   --   --   --  222*  --  382*  --  64*   *  --   --   --   --   --  111*  --  127*  --  64   ALKPHOS 96  --   --   --   --   --  64  --  90  --  180*   BILITOTAL 1.1  --   --   --   --   --  1.8*  --  3.5*  --  1.4*   ALBUMIN 2.7*  --   --   --   --   --  2.4*  --  2.3*  --  2.8*   INR  --  1.28* 1.32* 1.38* 1.36*   < > 1.47*   < >  --    < > 1.31*    < > = values in this interval not displayed.     Pancreatic Enzymes  Recent Labs   Lab 11/12/19  0346 11/10/19  1714   LIPASE 32*  --    AMYLASE 26* 41     Coagulation Profile  Recent Labs   Lab 11/13/19  0135 11/12/19  1945 11/12/19  1850 11/12/19  1645 11/12/19  1400  11/12/19  0346  11/11/19  1651 11/11/19  1600   INR 1.28* 1.32* 1.38* 1.36* 1.46*   < > 1.47*   < >  --  1.60*    PTT  --   --   --   --  39*  --  57*  --  48* 48*    < > = values in this interval not displayed.         5. RADIOLOGY:   Recent Results (from the past 24 hour(s))   US Liver Transplant Portable    Narrative    EXAMINATION: US LIVER TRANSPLANT PORTABLE, 2019 8:44 AM     COMPARISON: Abdominal ultrasound 2019, abdominal MRI 10/23/2019    HISTORY: 54-year-old male who is POD#1 s/p  donor liver  transplant.    TECHNIQUE:  Gray-scale, color Doppler and spectral flow analysis.    FINDINGS:   Fluid: There is a right-sided pleural effusion. Small amount of  ascites in the pelvis.    Liver:   The liver demonstrates normal homogeneous echotexture. No  evidence of a focal hepatic mass. There are 2 perihepatic fluid  collections, one is inferior to the right lobe, the other is posterior  to the right lobe. The inferior fluid collection is avascular and  hypoechoic, measuring 7.7 x 1.3 x 8.2 cm. The posterior fluid  collection is avascular and heterogeneous, measuring 8.3 x 2.0 x 7.4  cm and contains a surgical drain.    Bile Ducts: Both the intra- and extrahepatic biliary system are of  normal caliber.  The common bile duct measures 4 mm in diameter.    Gallbladder: The gallbladder is surgically absent.    Kidneys:   Right kidney: The right kidney measures 10.7 cm. Limited evaluation  shows no obvious hydronephrosis, shadowing renal stone, or renal mass.  Left kidney:  The right kidney measures 10.1 cm. Limited evaluation  shows no obvious hydronephrosis, shadowing renal stone, or renal mass.    Pancreas: Not well-visualized on this exam.    Spleen:  The spleen is enlarged, measuring 15.6 cm.    Visualized portions of the aorta are unremarkable.    LIVER DOPPLER:  Splenic vein at the hilum:  Patent continuous normal antegrade  direction flow towards the liver, 19 cm/sec.  Extrahepatic portal vein:  Patent continuous antegrade flow, 62  cm/sec.  Portal vein at anastomosis: Patent continuous antegrade flow,  108  cm/sec.  Intrahepatic portal vein:  Patent continuous antegrade flow, 92  cm/sec.  Right portal vein flow is antegrade, measuring 61 cm/sec.  Left portal vein flow is antegrade, measuring 31 cm/sec.    Inferior vena cava: patent with flow toward the heart throughout..  IVC above anastomosis:  162 cm/sec.  IVC at anastomosis:  114 cm/sec.  Intrahepatic IVC:  18 cm/sec.  Extrahepatic IVC:  22 cm/sec.    Right, mid, left hepatic veins: Patent with flow towards the inferior  vena cava.    Extrahepatic hepatic artery: Low resistance waveform with flow towards  the liver. 36 cm/sec with resistive index 0.77.  Right hepatic artery: 32 cm/sec with resistive index 0.80.  Left hepatic artery: 31 cm/sec with resistive index 0.88.      Impression    Impression:   1.  There are 2 perihepatic fluid collections, likely representing  hematomas versus seromas. One is inferior to the right hepatic lobe,  measuring 8.2 cm, the other is posterior to the right hepatic lobe  measuring 8.3 cm and contains a surgical drain.  2.  Elevated resistive indices in the right and left hepatic arteries.  Attention required on follow-up.  3.  Splenomegaly with spleen measuring 15.6 cm.  4.  Right-sided pleural effusion and mild ascites in the pelvis.  5.  Pancreas is not well visualized on this exam.   XR Abdomen Port 1 View    Narrative    Exam: XR ABDOMEN PORT 1 VW, 11/12/2019 12:29 PM    Indication: NJ placement    Comparison: 11/11/2019    Findings:   Feeding tube has been placed. The tip is second portion duodenum. Loop  in the fundus with a kink. 2 surgical drains and postoperative changes  of liver transplant. Biliary stent looped in the duodenum. No longer  appears to be going up into the biliary ductal system.    Nonobstructive bowel gas pattern. No free air or pneumatosis.      Impression    Impression: Nasogastric tube is been placed. The tip is in the second  portion of duodenum.    DINO LAWS MD   XR Abdomen Port 1 View     Narrative    EXAMINATION:  XR ABDOMEN PORT 1 VW 11/12/2019 7:36 PM.    COMPARISON: 11/12/2019.    HISTORY:  Evaluate NJ position after extubation    FINDINGS: Feeding tube tip projects over the second portion of the  duodenum. Gastric tube tip projects over the second portion of the  duodenum. Postsurgical changes with stable right upper quadrant  surgical drains, right upper quadrant clips and midline skin staples.  Biliary stent appears to be looped within the duodenum. Nonobstructive  bowel gas pattern. No pneumatosis or portal venous gas.       Impression    IMPRESSION:   1. Enteric tube tip projects over the second portion of the duodenum.  2. Gastric tube tip projects over antrum or proximal duodenum;  consider retraction.    I have personally reviewed the examination and initial interpretation  and I agree with the findings.    JAVAD PITT MD       =========================================      [place] [place]

## 2019-11-13 NOTE — PROGRESS NOTES
Pt back from PACU around 18:45 s/p Exp. Lap/abd washout.  Pt alert and oriented x 1-2, calm/pleasant but bit confused to times and situation.  HR 90-100s  -160.. on room air saturating mid 90s. pt c/o abd /incisional pain. Medicated with PRN oxycodone and dilaudid for breakthrough pain with good relief. PLT 26, notified MD.   HIT/TEG study sent.   Order to give 2 pk PLT per transplant MD. Plan to check labs after the transfusion.  Right LUANNE output > left LUANNE. (see I&O)Report given to night RN and will resume with cares and update MD as needed.

## 2019-11-13 NOTE — PLAN OF CARE
PT on 4A  Shift 8415-2384  D/I/A    2 units of platelets given for platelet count of 26 @1850, increased to 53@ 0130 recheck, 1 additional unit given   1 unit RBCs given for 0135 Hgb 8.0  Order to replace NG output 1:1 with 1/2 NS discussed with MD, instructed to not give overnight pending confirmation with transplant team in AM.     Neuro: Alert, intermittently confused, disoriented to time, VIRK, follows commands, pupils equal/reactive, PRN oxy and dilaudid for abdominal pain  CV: ST, L Radial A-line, SBP goal >170, no interventions needed  Pulm: RA overnight, LS clear  GI: NG to LCS, NJ clamped   : Carroll, good output  Access: R internal jugular, PIVs  LR @150  Insulin stopped 0700, alg 2-       Plan: Continue to Monitor, update MD with changes and concerns.   See flowsheets for additional documentation and intervention.

## 2019-11-13 NOTE — PLAN OF CARE
4A - PT evaluation completed and treatment initiated.   Discharge Planner PT   Patient plan for discharge: not discussed  Current status: Pt educated on abdominal precautions and fitted with abdominal binder for improved comfort and decreased pain. Pt completed supine > sit with min A, sit <> stand with CGA and FWW. Pt ambulated ~300' with FWW and CGA-min A, decreased step length, poor balance and impaired ability to navigate walker at times. Please encourage pt to count out low with going from standing to sitting to improve control and prevent holding breath.   Barriers to return to prior living situation: medical status, impaired functional mobility  Recommendations for discharge: TCU  Rationale for recommendations: Pt with deficits in strength, balance, activity tolerance, pain impacting overall functional mobility. Pt would benefit from continued therapy to address above deficits.       Entered by: Rosi Serrano 11/13/2019 1:47 PM

## 2019-11-13 NOTE — PROGRESS NOTES
Transplant Admission Psychosocial Assessment    Patient Name: Frandy Workman  : 1965  Age: 54 year old  MRN: [id number]  Date of Initial Social Work Evaluation: 19    Patient underwent a  donor liver transplant on 19.  Met with Miller and his friend/health care agent Davi Saunders to update psychosocial assessment and provide education about SW role while inpatient, and to begin discussion of expectations/requirements, caregiver needs and follow up needs post-transplant.     Presenting Information   Living Situation: [facility] [place].   If not local, plans for short term stay:  N/A  Previous Functional Status: independent with ambulation, medication management and personal cares.  Cultural/Language/Spiritual Considerations: Cheondoism, English    Support System  Primary Support Person: Davi Saunders  Other support: friends for emotional support  Plan for support in immediate post-transplant period: Art plans to stay with patient at his residence in [place], [place].  They may privately hire additional care giving services.    Health Care Directive  Decision Maker: patient  Alternate Decision Maker: Davi Saunders Jr. (friend) is named as the primary agent and Alfredo Rubio (friend) is named as the alternate agent.  Health Care Directive: Copy in Chart    Mental Health/Coping:   History of Mental Health: Trazadon for sleep, no other mental health history  History of Chemical Health: no significant history, no alcohol since 1996, no history of illicit drug use or pain medication abuse  Current status: stable  Coping: appropriate to situation  Services Needed/Recommended: Liver Transplant Support Group-Art plans to attend tomorrow    Financial   Income: long-term disability through his former employer and SSDI  Impact of transplant on income: increased health care costs  Insurance and medication coverage: Medicare and AARP, immunosuppression medications will  be billed through Medicare Part B  Financial concerns: none  Resources needed: none    Education provided by SW: Social Work role inpatient setting, availability of support group, parking information     Assessment and recommendations and plan:  Miller had originally named his friends Davi and Alley as his primary care givers.  They both reside in [place].  Unfortunately, Alley suffered a stroke in August of this year and will not be able to provide care giving.  Davi confirms his willingness to provide post transplant care giving for whatever time period is recommended.  He may decide to privately hire additional in-home services as needed.     Referral message left for Gayle Jones with [facility] to refer patient for private duty home care.    We discussed the potential need for inpatient rehab, and options [place].  Patient/friend Art prefer [facility].  Referral made to [facility].     ALEXYS Mcnair, [facility]  Liver Transplant   Phone [phone]  Pager [phone]

## 2019-11-13 NOTE — PROGRESS NOTES
"SPIRITUAL HEALTH SERVICES  SPIRITUAL ASSESSMENT Progress Note  [facility] 4A     I visited with the pt. was accompanied by his nurse. Asking him how his surgery went he related, \"They said I did real well.\" The pt was sitting in his chair. He went on to say, \"...but I am tired.\" We joined together in a brief prayer.    PLAN: Will visit weekly.    Gonzalo Mojica M.Div.     Pager [phone]    "

## 2019-11-14 ENCOUNTER — APPOINTMENT (OUTPATIENT)
Dept: ULTRASOUND IMAGING | Facility: CLINIC | Age: 54
DRG: 005 | End: 2019-11-14
Attending: PHYSICIAN ASSISTANT
Payer: MEDICARE

## 2019-11-14 ENCOUNTER — APPOINTMENT (OUTPATIENT)
Dept: SPEECH THERAPY | Facility: CLINIC | Age: 54
DRG: 005 | End: 2019-11-14
Attending: PHYSICIAN ASSISTANT
Payer: MEDICARE

## 2019-11-14 ENCOUNTER — APPOINTMENT (OUTPATIENT)
Dept: OCCUPATIONAL THERAPY | Facility: CLINIC | Age: 54
DRG: 005 | End: 2019-11-14
Attending: SURGERY
Payer: MEDICARE

## 2019-11-14 ENCOUNTER — APPOINTMENT (OUTPATIENT)
Dept: PHYSICAL THERAPY | Facility: CLINIC | Age: 54
DRG: 005 | End: 2019-11-14
Attending: SURGERY
Payer: MEDICARE

## 2019-11-14 ENCOUNTER — APPOINTMENT (OUTPATIENT)
Dept: GENERAL RADIOLOGY | Facility: CLINIC | Age: 54
DRG: 005 | End: 2019-11-14
Attending: PHYSICIAN ASSISTANT
Payer: MEDICARE

## 2019-11-14 LAB
ABO + RH BLD: NORMAL
ABO + RH BLD: NORMAL
ALBUMIN SERPL-MCNC: 2.4 G/DL (ref 3.4–5)
ALP SERPL-CCNC: 275 U/L (ref 40–150)
ALT SERPL W P-5'-P-CCNC: 110 U/L (ref 0–70)
ANION GAP SERPL CALCULATED.3IONS-SCNC: 2 MMOL/L (ref 3–14)
AST SERPL W P-5'-P-CCNC: 122 U/L (ref 0–45)
BASOPHILS # BLD AUTO: 0 10E9/L (ref 0–0.2)
BASOPHILS NFR BLD AUTO: 0.5 %
BILIRUB DIRECT SERPL-MCNC: 0.5 MG/DL (ref 0–0.2)
BILIRUB SERPL-MCNC: 1.1 MG/DL (ref 0.2–1.3)
BLD GP AB SCN SERPL QL: NORMAL
BLD PROD TYP BPU: NORMAL
BLD PROD TYP BPU: NORMAL
BLD UNIT ID BPU: 0
BLOOD BANK CMNT PATIENT-IMP: NORMAL
BLOOD PRODUCT CODE: NORMAL
BPU ID: NORMAL
BUN SERPL-MCNC: 38 MG/DL (ref 7–30)
CA-I SERPL ISE-MCNC: 4.7 MG/DL (ref 4.4–5.2)
CALCIUM SERPL-MCNC: 7.9 MG/DL (ref 8.5–10.1)
CHLORIDE SERPL-SCNC: 116 MMOL/L (ref 94–109)
CO2 SERPL-SCNC: 30 MMOL/L (ref 20–32)
COPATH REPORT: NORMAL
CREAT SERPL-MCNC: 1.29 MG/DL (ref 0.66–1.25)
DIFFERENTIAL METHOD BLD: ABNORMAL
EOSINOPHIL # BLD AUTO: 0 10E9/L (ref 0–0.7)
EOSINOPHIL NFR BLD AUTO: 1.9 %
ERYTHROCYTE [DISTWIDTH] IN BLOOD BY AUTOMATED COUNT: 15.6 % (ref 10–15)
ERYTHROCYTE [DISTWIDTH] IN BLOOD BY AUTOMATED COUNT: 15.6 % (ref 10–15)
ERYTHROCYTE [DISTWIDTH] IN BLOOD BY AUTOMATED COUNT: 15.8 % (ref 10–15)
FIBRINOGEN PPP-MCNC: 245 MG/DL (ref 200–420)
FIBRINOGEN PPP-MCNC: 260 MG/DL (ref 200–420)
GFR SERPL CREATININE-BSD FRML MDRD: 62 ML/MIN/{1.73_M2}
GLUCOSE BLDC GLUCOMTR-MCNC: 102 MG/DL (ref 70–99)
GLUCOSE BLDC GLUCOMTR-MCNC: 115 MG/DL (ref 70–99)
GLUCOSE BLDC GLUCOMTR-MCNC: 115 MG/DL (ref 70–99)
GLUCOSE BLDC GLUCOMTR-MCNC: 117 MG/DL (ref 70–99)
GLUCOSE BLDC GLUCOMTR-MCNC: 119 MG/DL (ref 70–99)
GLUCOSE BLDC GLUCOMTR-MCNC: 127 MG/DL (ref 70–99)
GLUCOSE BLDC GLUCOMTR-MCNC: 130 MG/DL (ref 70–99)
GLUCOSE BLDC GLUCOMTR-MCNC: 134 MG/DL (ref 70–99)
GLUCOSE BLDC GLUCOMTR-MCNC: 137 MG/DL (ref 70–99)
GLUCOSE BLDC GLUCOMTR-MCNC: 138 MG/DL (ref 70–99)
GLUCOSE BLDC GLUCOMTR-MCNC: 142 MG/DL (ref 70–99)
GLUCOSE BLDC GLUCOMTR-MCNC: 145 MG/DL (ref 70–99)
GLUCOSE BLDC GLUCOMTR-MCNC: 149 MG/DL (ref 70–99)
GLUCOSE BLDC GLUCOMTR-MCNC: 199 MG/DL (ref 70–99)
GLUCOSE BLDC GLUCOMTR-MCNC: 234 MG/DL (ref 70–99)
GLUCOSE SERPL-MCNC: 119 MG/DL (ref 70–99)
HCT VFR BLD AUTO: 23.1 % (ref 40–53)
HCT VFR BLD AUTO: 27.3 % (ref 40–53)
HCT VFR BLD AUTO: 27.4 % (ref 40–53)
HGB BLD-MCNC: 10.3 G/DL (ref 13.3–17.7)
HGB BLD-MCNC: 7.7 G/DL (ref 13.3–17.7)
HGB BLD-MCNC: 9.1 G/DL (ref 13.3–17.7)
HGB BLD-MCNC: 9.1 G/DL (ref 13.3–17.7)
IMM GRANULOCYTES # BLD: 0 10E9/L (ref 0–0.4)
IMM GRANULOCYTES NFR BLD: 1.4 %
INR PPP: 1.19 (ref 0.86–1.14)
INR PPP: 1.24 (ref 0.86–1.14)
LACTATE BLD-SCNC: 2.4 MMOL/L (ref 0.7–2)
LYMPHOCYTES # BLD AUTO: 0.3 10E9/L (ref 0.8–5.3)
LYMPHOCYTES NFR BLD AUTO: 12.9 %
MAGNESIUM SERPL-MCNC: 2.1 MG/DL (ref 1.6–2.3)
MCH RBC QN AUTO: 30.3 PG (ref 26.5–33)
MCH RBC QN AUTO: 30.5 PG (ref 26.5–33)
MCH RBC QN AUTO: 30.7 PG (ref 26.5–33)
MCHC RBC AUTO-ENTMCNC: 33.2 G/DL (ref 31.5–36.5)
MCHC RBC AUTO-ENTMCNC: 33.3 G/DL (ref 31.5–36.5)
MCHC RBC AUTO-ENTMCNC: 33.3 G/DL (ref 31.5–36.5)
MCV RBC AUTO: 91 FL (ref 78–100)
MCV RBC AUTO: 92 FL (ref 78–100)
MCV RBC AUTO: 92 FL (ref 78–100)
MONOCYTES # BLD AUTO: 0.1 10E9/L (ref 0–1.3)
MONOCYTES NFR BLD AUTO: 5.7 %
NEUTROPHILS # BLD AUTO: 1.6 10E9/L (ref 1.6–8.3)
NEUTROPHILS NFR BLD AUTO: 77.6 %
NRBC # BLD AUTO: 0 10*3/UL
NRBC BLD AUTO-RTO: 0 /100
NUM BPU REQUESTED: 2
PHOSPHATE SERPL-MCNC: 2.8 MG/DL (ref 2.5–4.5)
PLATELET # BLD AUTO: 38 10E9/L (ref 150–450)
PLATELET # BLD AUTO: 39 10E9/L (ref 150–450)
PLATELET # BLD AUTO: 40 10E9/L (ref 150–450)
POTASSIUM SERPL-SCNC: 3.8 MMOL/L (ref 3.4–5.3)
PROT SERPL-MCNC: 4.8 G/DL (ref 6.8–8.8)
RBC # BLD AUTO: 2.51 10E12/L (ref 4.4–5.9)
RBC # BLD AUTO: 2.98 10E12/L (ref 4.4–5.9)
RBC # BLD AUTO: 3 10E12/L (ref 4.4–5.9)
SODIUM SERPL-SCNC: 148 MMOL/L (ref 133–144)
SPECIMEN EXP DATE BLD: NORMAL
TACROLIMUS BLD-MCNC: 3.3 UG/L (ref 5–15)
TME LAST DOSE: ABNORMAL H
TRANSFUSION STATUS PATIENT QL: NORMAL
TRANSFUSION STATUS PATIENT QL: NORMAL
WBC # BLD AUTO: 2 10E9/L (ref 4–11)
WBC # BLD AUTO: 2.1 10E9/L (ref 4–11)
WBC # BLD AUTO: 2.3 10E9/L (ref 4–11)

## 2019-11-14 PROCEDURE — 83735 ASSAY OF MAGNESIUM: CPT | Performed by: SURGERY

## 2019-11-14 PROCEDURE — 25000132 ZZH RX MED GY IP 250 OP 250 PS 637: Mod: GY | Performed by: PHYSICIAN ASSISTANT

## 2019-11-14 PROCEDURE — 25000132 ZZH RX MED GY IP 250 OP 250 PS 637: Mod: GY | Performed by: STUDENT IN AN ORGANIZED HEALTH CARE EDUCATION/TRAINING PROGRAM

## 2019-11-14 PROCEDURE — 97165 OT EVAL LOW COMPLEX 30 MIN: CPT | Mod: GO | Performed by: OCCUPATIONAL THERAPIST

## 2019-11-14 PROCEDURE — 25800030 ZZH RX IP 258 OP 636

## 2019-11-14 PROCEDURE — 25000125 ZZHC RX 250: Performed by: PHYSICIAN ASSISTANT

## 2019-11-14 PROCEDURE — 82330 ASSAY OF CALCIUM: CPT | Performed by: SURGERY

## 2019-11-14 PROCEDURE — 25000131 ZZH RX MED GY IP 250 OP 636 PS 637: Mod: GY | Performed by: SURGERY

## 2019-11-14 PROCEDURE — 97530 THERAPEUTIC ACTIVITIES: CPT | Mod: GP

## 2019-11-14 PROCEDURE — 85610 PROTHROMBIN TIME: CPT | Performed by: PHYSICIAN ASSISTANT

## 2019-11-14 PROCEDURE — 25000132 ZZH RX MED GY IP 250 OP 250 PS 637: Mod: GY

## 2019-11-14 PROCEDURE — 80076 HEPATIC FUNCTION PANEL: CPT | Performed by: SURGERY

## 2019-11-14 PROCEDURE — 85384 FIBRINOGEN ACTIVITY: CPT | Performed by: PHYSICIAN ASSISTANT

## 2019-11-14 PROCEDURE — 25000131 ZZH RX MED GY IP 250 OP 636 PS 637: Mod: GY | Performed by: NURSE PRACTITIONER

## 2019-11-14 PROCEDURE — 80197 ASSAY OF TACROLIMUS: CPT | Performed by: NURSE PRACTITIONER

## 2019-11-14 PROCEDURE — 85025 COMPLETE CBC W/AUTO DIFF WBC: CPT | Performed by: SURGERY

## 2019-11-14 PROCEDURE — P9016 RBC LEUKOCYTES REDUCED: HCPCS | Performed by: STUDENT IN AN ORGANIZED HEALTH CARE EDUCATION/TRAINING PROGRAM

## 2019-11-14 PROCEDURE — 25000131 ZZH RX MED GY IP 250 OP 636 PS 637: Mod: GY | Performed by: PHYSICIAN ASSISTANT

## 2019-11-14 PROCEDURE — 85027 COMPLETE CBC AUTOMATED: CPT | Performed by: PHYSICIAN ASSISTANT

## 2019-11-14 PROCEDURE — 25000128 H RX IP 250 OP 636: Performed by: SURGERY

## 2019-11-14 PROCEDURE — 40000986 XR ABDOMEN PORT 1 VW

## 2019-11-14 PROCEDURE — 25000132 ZZH RX MED GY IP 250 OP 250 PS 637: Mod: GY | Performed by: SURGERY

## 2019-11-14 PROCEDURE — 25000128 H RX IP 250 OP 636: Performed by: PHYSICIAN ASSISTANT

## 2019-11-14 PROCEDURE — 97535 SELF CARE MNGMENT TRAINING: CPT | Mod: GO | Performed by: OCCUPATIONAL THERAPIST

## 2019-11-14 PROCEDURE — 80048 BASIC METABOLIC PNL TOTAL CA: CPT | Performed by: SURGERY

## 2019-11-14 PROCEDURE — 25000132 ZZH RX MED GY IP 250 OP 250 PS 637: Mod: GY | Performed by: NURSE PRACTITIONER

## 2019-11-14 PROCEDURE — 27210429 ZZH NUTRITION PRODUCT INTERMEDIATE LITER

## 2019-11-14 PROCEDURE — 25000128 H RX IP 250 OP 636: Performed by: NURSE PRACTITIONER

## 2019-11-14 PROCEDURE — 00000146 ZZHCL STATISTIC GLUCOSE BY METER IP

## 2019-11-14 PROCEDURE — 25000128 H RX IP 250 OP 636: Performed by: STUDENT IN AN ORGANIZED HEALTH CARE EDUCATION/TRAINING PROGRAM

## 2019-11-14 PROCEDURE — 36592 COLLECT BLOOD FROM PICC: CPT | Performed by: PHYSICIAN ASSISTANT

## 2019-11-14 PROCEDURE — 97116 GAIT TRAINING THERAPY: CPT | Mod: GP

## 2019-11-14 PROCEDURE — 92610 EVALUATE SWALLOWING FUNCTION: CPT | Mod: GN

## 2019-11-14 PROCEDURE — 12000026 ZZH R&B TRANSPLANT

## 2019-11-14 PROCEDURE — 84100 ASSAY OF PHOSPHORUS: CPT | Performed by: SURGERY

## 2019-11-14 PROCEDURE — 83605 ASSAY OF LACTIC ACID: CPT

## 2019-11-14 PROCEDURE — 93975 VASCULAR STUDY: CPT | Mod: TC

## 2019-11-14 PROCEDURE — 85018 HEMOGLOBIN: CPT | Performed by: PHYSICIAN ASSISTANT

## 2019-11-14 PROCEDURE — 36592 COLLECT BLOOD FROM PICC: CPT | Performed by: SURGERY

## 2019-11-14 RX ORDER — CARVEDILOL 25 MG/1
25 TABLET ORAL 2 TIMES DAILY WITH MEALS
Status: DISCONTINUED | OUTPATIENT
Start: 2019-11-14 | End: 2019-11-15

## 2019-11-14 RX ORDER — AMLODIPINE BESYLATE 10 MG/1
10 TABLET ORAL DAILY
Status: DISCONTINUED | OUTPATIENT
Start: 2019-11-15 | End: 2019-11-20 | Stop reason: HOSPADM

## 2019-11-14 RX ORDER — LIDOCAINE HYDROCHLORIDE 20 MG/ML
10 SOLUTION OROPHARYNGEAL ONCE
Status: COMPLETED | OUTPATIENT
Start: 2019-11-14 | End: 2019-11-14

## 2019-11-14 RX ORDER — LABETALOL 20 MG/4 ML (5 MG/ML) INTRAVENOUS SYRINGE
10 EVERY 6 HOURS PRN
Status: DISCONTINUED | OUTPATIENT
Start: 2019-11-14 | End: 2019-11-17

## 2019-11-14 RX ORDER — BISACODYL 10 MG
10 SUPPOSITORY, RECTAL RECTAL DAILY PRN
Status: DISCONTINUED | OUTPATIENT
Start: 2019-11-14 | End: 2019-11-20 | Stop reason: HOSPADM

## 2019-11-14 RX ORDER — AMLODIPINE BESYLATE 5 MG/1
5 TABLET ORAL 2 TIMES DAILY
Status: DISCONTINUED | OUTPATIENT
Start: 2019-11-14 | End: 2019-11-14

## 2019-11-14 RX ORDER — OXYCODONE HYDROCHLORIDE 5 MG/1
5-10 TABLET ORAL EVERY 4 HOURS PRN
Status: DISCONTINUED | OUTPATIENT
Start: 2019-11-14 | End: 2019-11-17

## 2019-11-14 RX ORDER — TACROLIMUS 0.5 MG/1
4 CAPSULE, GELATIN COATED ORAL
Status: DISCONTINUED | OUTPATIENT
Start: 2019-11-14 | End: 2019-11-18

## 2019-11-14 RX ADMIN — Medication 2 PACKET: at 08:14

## 2019-11-14 RX ADMIN — HYDROMORPHONE HYDROCHLORIDE 0.5 MG: 1 INJECTION, SOLUTION INTRAMUSCULAR; INTRAVENOUS; SUBCUTANEOUS at 11:07

## 2019-11-14 RX ADMIN — PIPERACILLIN SODIUM AND TAZOBACTAM SODIUM 3.38 G: 3; .375 INJECTION, POWDER, LYOPHILIZED, FOR SOLUTION INTRAVENOUS at 04:50

## 2019-11-14 RX ADMIN — HYDROMORPHONE HYDROCHLORIDE 0.3 MG: 1 INJECTION, SOLUTION INTRAMUSCULAR; INTRAVENOUS; SUBCUTANEOUS at 20:25

## 2019-11-14 RX ADMIN — SENNOSIDES AND DOCUSATE SODIUM 2 TABLET: 8.6; 5 TABLET ORAL at 08:13

## 2019-11-14 RX ADMIN — TRAZODONE HYDROCHLORIDE 50 MG: 50 TABLET ORAL at 22:33

## 2019-11-14 RX ADMIN — OLANZAPINE 2.5 MG: 2.5 TABLET, FILM COATED ORAL at 22:33

## 2019-11-14 RX ADMIN — NYSTATIN 1000000 UNITS: 500000 SUSPENSION ORAL at 08:13

## 2019-11-14 RX ADMIN — Medication 4 MG: at 17:57

## 2019-11-14 RX ADMIN — OXYCODONE HYDROCHLORIDE 10 MG: 5 TABLET ORAL at 08:12

## 2019-11-14 RX ADMIN — POTASSIUM CHLORIDE 20 MEQ: 1.5 POWDER, FOR SOLUTION ORAL at 06:50

## 2019-11-14 RX ADMIN — AMLODIPINE BESYLATE 5 MG: 5 TABLET ORAL at 08:13

## 2019-11-14 RX ADMIN — HYDROMORPHONE HYDROCHLORIDE 0.5 MG: 1 INJECTION, SOLUTION INTRAMUSCULAR; INTRAVENOUS; SUBCUTANEOUS at 05:46

## 2019-11-14 RX ADMIN — NYSTATIN 1000000 UNITS: 500000 SUSPENSION ORAL at 12:45

## 2019-11-14 RX ADMIN — Medication 600 MG: at 08:16

## 2019-11-14 RX ADMIN — Medication 3 MG: at 08:14

## 2019-11-14 RX ADMIN — POLYETHYLENE GLYCOL 3350 17 G: 17 POWDER, FOR SOLUTION ORAL at 19:38

## 2019-11-14 RX ADMIN — PIPERACILLIN SODIUM AND TAZOBACTAM SODIUM 3.38 G: 3; .375 INJECTION, POWDER, LYOPHILIZED, FOR SOLUTION INTRAVENOUS at 09:33

## 2019-11-14 RX ADMIN — VALGANCICLOVIR 450 MG: 450 TABLET, FILM COATED ORAL at 08:12

## 2019-11-14 RX ADMIN — BISACODYL 10 MG: 10 SUPPOSITORY RECTAL at 14:21

## 2019-11-14 RX ADMIN — MYCOPHENOLATE MOFETIL 750 MG: 200 POWDER, FOR SUSPENSION ORAL at 17:59

## 2019-11-14 RX ADMIN — MYCOPHENOLATE MOFETIL 750 MG: 200 POWDER, FOR SUSPENSION ORAL at 08:14

## 2019-11-14 RX ADMIN — MULTIVITAMIN 15 ML: LIQUID ORAL at 08:13

## 2019-11-14 RX ADMIN — NYSTATIN 1000000 UNITS: 500000 SUSPENSION ORAL at 19:38

## 2019-11-14 RX ADMIN — PIPERACILLIN SODIUM AND TAZOBACTAM SODIUM 3.38 G: 3; .375 INJECTION, POWDER, LYOPHILIZED, FOR SOLUTION INTRAVENOUS at 16:13

## 2019-11-14 RX ADMIN — CARVEDILOL 25 MG: 25 TABLET, FILM COATED ORAL at 17:57

## 2019-11-14 RX ADMIN — OXYCODONE HYDROCHLORIDE 10 MG: 5 TABLET ORAL at 02:49

## 2019-11-14 RX ADMIN — SULFAMETHOXAZOLE AND TRIMETHOPRIM 1 TABLET: 400; 80 TABLET ORAL at 08:12

## 2019-11-14 RX ADMIN — PIPERACILLIN SODIUM AND TAZOBACTAM SODIUM 3.38 G: 3; .375 INJECTION, POWDER, LYOPHILIZED, FOR SOLUTION INTRAVENOUS at 22:13

## 2019-11-14 RX ADMIN — METHYLPREDNISOLONE SODIUM SUCCINATE 50 MG: 125 INJECTION, POWDER, FOR SOLUTION INTRAMUSCULAR; INTRAVENOUS at 09:33

## 2019-11-14 RX ADMIN — OXYCODONE HYDROCHLORIDE 10 MG: 5 TABLET ORAL at 17:57

## 2019-11-14 RX ADMIN — CARVEDILOL 12.5 MG: 12.5 TABLET, FILM COATED ORAL at 08:13

## 2019-11-14 RX ADMIN — OMEPRAZOLE 40 MG: KIT at 08:14

## 2019-11-14 RX ADMIN — LIDOCAINE HYDROCHLORIDE 10 ML: 20 SOLUTION ORAL; TOPICAL at 13:59

## 2019-11-14 RX ADMIN — TENOFOVIR DISOPROXIL FUMARATE 300 MG: 300 TABLET ORAL at 08:15

## 2019-11-14 RX ADMIN — SODIUM CHLORIDE, POTASSIUM CHLORIDE, SODIUM LACTATE AND CALCIUM CHLORIDE: 600; 310; 30; 20 INJECTION, SOLUTION INTRAVENOUS at 05:46

## 2019-11-14 RX ADMIN — NYSTATIN 1000000 UNITS: 500000 SUSPENSION ORAL at 16:13

## 2019-11-14 RX ADMIN — HYDROMORPHONE HYDROCHLORIDE 0.5 MG: 1 INJECTION, SOLUTION INTRAMUSCULAR; INTRAVENOUS; SUBCUTANEOUS at 16:13

## 2019-11-14 RX ADMIN — POLYETHYLENE GLYCOL 3350 17 G: 17 POWDER, FOR SOLUTION ORAL at 08:13

## 2019-11-14 RX ADMIN — SENNOSIDES AND DOCUSATE SODIUM 2 TABLET: 8.6; 5 TABLET ORAL at 19:38

## 2019-11-14 ASSESSMENT — ACTIVITIES OF DAILY LIVING (ADL)
ADLS_ACUITY_SCORE: 15
ADLS_ACUITY_SCORE: 15
ADLS_ACUITY_SCORE: 17
ADLS_ACUITY_SCORE: 17
PREVIOUS_RESPONSIBILITIES: MEAL PREP;HOUSEKEEPING;LAUNDRY;SHOPPING;MEDICATION MANAGEMENT;FINANCES;DRIVING
ADLS_ACUITY_SCORE: 15
ADLS_ACUITY_SCORE: 17

## 2019-11-14 ASSESSMENT — PAIN DESCRIPTION - DESCRIPTORS
DESCRIPTORS: ACHING
DESCRIPTORS: ACHING

## 2019-11-14 NOTE — PROGRESS NOTES
Transplant Surgery  Inpatient Daily Progress Note  11/14/2019    Assessment & Plan: Frandy Workman is a 54 year old male with a past medical history of cirrhosis secondary to ESTRADA diagnosed in 2013, HCC 2018, HTN, HLD, GERD, BPH, and DM2 who underwent a DBD liver transplant on 11/11/19 with Dr. Ramos.  He returned to OR POD1 for ex lap, hematoma evacuation, and washout.     Graft function: Tbili 1.1 (from 1.1). Alk phos 275 (from 96). INR 1.19. Liver U/S yesterday demonstrating patent vessels. JPs with serosanguinous output.   Immunosuppression management:   Induction: Solumedrol taper per protocol  MMF: 750mg BID  FK:  3mg BID; tac level pending (goal 10-12)  Complexity of management:Medium. Contributing factors: thrombocytopenia, anemia and induction  Hematology:   Acute blood loss anemia: Hgb as low as 5.6 prior to washout; received 4 units PRBCs.  Hgb 7.7 overnight; 1unit PRBC transfused.  Recheck Hgb 9.1.  Thrombocytopenia: Plt 19 POD1; 4 units platelets transfused. HIT panel negative.  Plt 39 today.  Hypofibrinogenemia: <200 POD1; 5 units cryoprecipitate given. Today fibrinogen 260. Resolved.  Cardiorespiratory: Stable on RA; requiring 2L while sleeping.  Hypertension: SBPs 140-160's. PTA Coreg 12.5mg BID.  Amlodipine 5mg BID initiated.    GI/Nutrition: No flatus or BM. Bowel regimen ordered.  Non-severe malnutrition in the context of chronic illness: NJ with TF at 30mL/hr (goal 60mL/hr).  Multivitamin daily. SLP to evaluate today for initiation of diet recommendations.   Endocrine:   DM2 with steroid-induced hyperglycemia: BG ; insulin drip titrated per nursing. PTA was on Tresiba 70 units and Metformin.   Fluid/Electrolytes:  Hypernatremia: Na 148 (from 147); LR increased to 150mL/hr yesterday, now discontinued. Free water flushes ordered 100cc q1h.   : No acute issues.  Infectious disease: Donor UC +E. coli; continue zosyn x7 days. Donor Hep B core positive; tenofovir initiated POD2.  Recipient Hep B negative, positive surface antibody.   Neuro: Oxycodone and IV dilaudid for abdominal pain.  Prophylaxis: DVT (SCDs), GI (prilosec), fall, fungal (Nystatin), viral (Valcyte), pneumocystis (Bactrim)  Disposition: PT/OT ordered; Transfer to     Medical Decision Making: High  Subsequent visit 98581 (high level decision making)    SERA/Fellow/Resident Provider: Maryanne Recio PA-C [id number]    Faculty: Uma Moreno M.D.    __________________________________________________________________  Transplant History:    11/11/2019 (Liver), Postoperative day: 3     Interval History: History obtained from patient.  Overnight events: No acute events overnight.  Patient seen in SICU sitting upright in chair.  He is alert and follows commands. He reports mild nausea after morning medications. He reports his abdominal pain is a 6-7 and improving.  He has not passed gas or had BM.      ROS:   A 10-point review of systems was negative except as noted above.    Curent Meds:    alpha-lipoic acid  600 mg Oral Daily     amLODIPine  5 mg Oral BID     carvedilol  12.5 mg Oral BID w/meals     methylPREDNISolone  50 mg Intravenous Once    Followed by     [START ON 11/15/2019] predniSONE  25 mg Oral Once    Followed by     [START ON 11/16/2019] predniSONE  10 mg Oral Once     multivitamins w/minerals  15 mL Per Feeding Tube Daily     mycophenolate  750 mg Oral BID IS    Or     mycophenolate  750 mg Oral or NG Tube BID IS     nystatin  10 mL Mouth/Throat 4x Daily     OLANZapine  2.5 mg Oral At Bedtime     omeprazole  40 mg Oral or Feeding Tube QAM AC     piperacillin-tazobactam  3.375 g Intravenous Q6H     polyethylene glycol  17 g Oral BID     protein modular  2 packet Per Feeding Tube Daily     senna-docusate  1 tablet Oral BID    Or     senna-docusate  2 tablet Oral BID     sodium chloride (PF)  3 mL Intravenous Q8H     sulfamethoxazole-trimethoprim  1 tablet Oral Daily     tacrolimus  3 mg Oral BID IS    Or     tacrolimus  3  mg Oral or NG Tube BID IS     tenofovir  300 mg Oral Daily     traZODone  50 mg Oral At Bedtime     valGANciclovir  450 mg Oral Daily    Or     valGANciclovir  450 mg Oral or NG Tube Daily       Physical Exam:     Admit Weight: 79.4 kg (175 lb 1.6 oz)    Current Vitals:   BP (!) 153/80 (BP Location: Right arm, Cuff Size: Adult Regular)   Pulse 106   Temp 98.3  F (36.8  C) (Oral)   Resp 17   Wt 85.4 kg (188 lb 4.4 oz)   SpO2 96%   BMI 27.80 kg/m      Vital sign ranges:    Temp:  [97.6  F (36.4  C)-98.4  F (36.9  C)] 98.3  F (36.8  C)  Pulse:  [] 106  Heart Rate:  [] 106  Resp:  [8-18] 17  BP: (121-166)/(65-91) 153/80  SpO2:  [89 %-100 %] 96 %    General Appearance: in no apparent distress.   Skin: normal, warm, dry  Heart: sinus tachycardia; well-perfused  Lungs: non-labored on RA  Abdomen: operative dressing removed; incision stapled, well-approximated, without erythema.  Left and right JPs with serosanguinous output.  : Carroll removed  Extremities: edema: +1 noted in BLE.  Neurologic: Alert; follows commands. Pupils 2mm.    Frailty Scores     There is no flowsheet data to display.          Data:   CMP  Recent Labs   Lab 11/14/19  0443 11/13/19  0407  11/12/19  1513 11/12/19  1400 11/12/19  0346  11/10/19  1714   * 147*   < > 144 145* 145*   < > 140   POTASSIUM 3.8 4.0   < > 4.2 4.2 3.8   < > 4.3   CHLORIDE 116* 115*   < >  --   --  114*   < > 112*   CO2 30 25   < >  --   --  26   < > 24   * 128*   < > 156* 147* 123*   < > 154*   BUN 38* 32*   < >  --   --  24   < > 18   CR 1.29* 1.31*   < >  --   --  1.22   < > 1.18   GFRESTIMATED 62 61   < >  --   --  66   < > 69   GFRESTBLACK 71 70   < >  --   --  76   < > 80   DEBORAH 7.9* 8.3*   < >  --   --  9.1   < > 9.3   ICAW  --   --   --  4.8 4.9 5.1   < >  --    MAG 2.1 2.1  --   --   --  2.2   < > 2.1   PHOS 2.8 4.1  --   --   --  3.5   < > 2.5   AMYLASE  --   --   --   --   --  26*  --  41   LIPASE  --   --   --   --   --  32*  --   --     ALBUMIN 2.4* 2.7*  --   --   --  2.4*   < > 2.8*   BILITOTAL 1.1 1.1  --   --   --  1.8*   < > 1.4*   ALKPHOS 275* 96  --   --   --  64   < > 180*   * 144*  --   --   --  222*   < > 64*   * 103*  --   --   --  111*   < > 64    < > = values in this interval not displayed.     CBC  Recent Labs   Lab 11/14/19  0551 11/14/19  0443   HGB 9.1* 9.1*   WBC 2.3* 2.1*   PLT 39* 38*

## 2019-11-14 NOTE — PROGRESS NOTES
Neuro: A/o x4, confused at times to time/place but easily reoriented. PERRL, follows commands, pain controlled with PRN oxycodone and dilaudid.     CV: NSR-ST. SBP goal <170. No PRNs needed. Pulses are palpable. Complained of tingling in fingers and toes overnight, MD aware. Lab goals- hgb >8 (1 unit RBC transfused overnight), plt >50, INR <2, fib >200.     Resp: 2L oxymask overnight while sleeping. Sats in the 90s. Lung sounds are clear/diminished. IS follow up.     GI: Hypoactive bowel sounds. TF @ 30 ml/hr with q 1 hr 100 ml free water flushes. Goal of 60 ml/hr.     : Voiding spontaneously, 800 ml out over night.     Skin: Clamshell incision, dry and intact. Turned 2 q hrs.     Gtt: Insulin @ 1 unit/hr, BG have been stable overnight (on algorithm #2)  LR @ 75 ml/hr    Plan: Muscle strengthening, independence and encouragement. Possible transfer to the floor. Continue to monitor and assess for changes.

## 2019-11-14 NOTE — PROGRESS NOTES
SURGICAL ICU PROGRESS NOTE  November 14, 2019      ASSESSMENT: Frandy Workman is a 54M with PMHx significant for cirrhosis 2/2 ESTRADA diagnosed in 2013, HCC 2018, HTN, HLD, GERD, BPH and DMII who is admitted to the SICU s/p DDLT on 11/11/19 with Dr. Ramos  and exploratory laparotomy 11/12/2019.    CHANGES TODAY:  Free water flushes 100cc q1hr for Na+ correction  Discontinue LR  Amlodipine 5mg BID  Follow-up transplant re DVT ppx  Labetolol 10mg q6h prn  Bisacodyl added, can use if need be  TTF  Advance diet      PLAN:   Neuro/ pain/ sedation:  #Acute postop pain  - Monitor neurological status. Notify the MD for any acute changes in exam.  - Oxy q3 prn, dilaudid q2 IV prn for pain control      Pulmonary care:   -Supplemental oxygen to keep saturation above 92 %.  - Encourage incentive spirometry       Cardiovascular:    #HTN  #HLD  #CAD  #Acute blood loss anemia  - Monitor hemodynamic status.   - Amlodipine 5mg BID  - PTA carvedilol   - Hydralazine prn for uncontrolled hypertension   - Labetolol prn for uncontrolled hypertension     GI care:   #ESTRADA  #HCC s/p DDLT  #GERD  - Advance TF to goal  - Bowel regimen with senokot and miralax   - Bisacodyl added, can use if need be  - RUQ US: Increased elevation of the resistive index of the right hepatic  artery measuring up to 1.0 cm. Recommend attention on follow-up      Fluids/ Electrolytes/ Nutrition:   #hypernatremia  - Free water deficit of 2.9L, correct with FWF 100c q1h  - discontinue LR  - ICU electrolyte replacement protocol  - No indication for parenteral nutrition.  - Nutrition consulted. Appreciate recs    Renal/ Fluid Balance:    #BPH  - Urine output has been adequate so far  - Will continue to monitor intake and output.      Endocrine:    #DMII  #steroid induced hyperglycemia  - Insulin gtt for BG goals < 180       ID/ Antibiotics:  #transplant immunosuppression   - Zosyn 3.375g IV q6hr  - Bactrim/Septra qDay  - Valcyte 900mg qDay  - Tenofovir HbCab+    - Methylprednisolone taper   - Tacrolimus   - Nystatin suspension   - Mycophenolate       Heme:     #Portal vein thrombosis  #Acute blood loss anemia  #HIT  - HIT panel -ve  - follow-up transplant re DVT ppx  - Post op: hgb 9.2 plt 57 INR 1.60 fibrinogen 192   -- Transfusion goals: hgb < 8.0; plts < 30; fibrinogen > 200; INR < 2.0      Prophylaxis:    - Mechanical prophylaxis for DVT.   - follow-up transplant re chemo ppx  - Protonix 40mg      MSK:    - PT and OT consulted. Appreciate recs.      Lines/ tubes/ drains:  - PIV, NG, RIJ MAC, LUANNE     Disposition:  Possible transfer to floor today    Patient seen, findings and plan discussed during SICU rounds with staff Dr. Elda Kelly MD  Anesthesiology PGY1  [phone]  ====================================    TODAY'S PROGRESS:   SUBJECTIVE:   Received 1U PRBC ON for hgb 7.7. Otherwise no acute events. Pain well managed. Denies CP, SOB, N/V, ab pain, calf pain/tenderness/swelling.    OBJECTIVE:   1. VITAL SIGNS:   Temp:  [97.6  F (36.4  C)-98.8  F (37.1  C)] 97.6  F (36.4  C)  Pulse:  [73-99] 95  Heart Rate:  [] 89  Resp:  [8-18] 9  BP: (121-166)/(65-98) 152/83  MAP:  [106 mmHg-117 mmHg] 117 mmHg  Arterial Line BP: (157-172)/(67-75) 172/75  SpO2:  [89 %-100 %] 100 %  Resp: 9      2. INTAKE/ OUTPUT:   I/O last 3 completed shifts:  In: 5127.09 [I.V.:3838.09; NG/GT:210]  Out: 3150 [Urine:2095; Emesis/NG output:100; Drains:955]    3. PHYSICAL EXAMINATION:   General: Pleasant man sitting comfortably in a chair  Neuro: A&Ox3, NAD  Resp: Breathing non-labored. LCTAB  CV: RRR. No m/r/g  Abdomen: Soft obese, Non-distended, Non-tender.   Incisions: c/d/i/. Minimal serosang drainage. No signs of dehiscence, hematoma, infection, erythema  Extremities: warm and well perfused. Moves all extremities spontaneously. Dp/Pt palpable b/l.    4. INVESTIGATIONS:   Arterial Blood Gases   Recent Labs   Lab 11/12/19  1513 11/12/19  1400 11/11/19  1651 11/11/19  1600   PH 7.37 7.36  7.41 7.48*   PCO2 42 43 42 36   PO2 114* 92 81 98   HCO3 24 24 27 26     Complete Blood Count   Recent Labs   Lab 11/14/19 0443 11/14/19 0018 11/13/19 1755 11/13/19  1217   WBC 2.1* 2.0* 2.2* 3.3*   HGB 9.1* 7.7* 8.1* 9.5*   PLT 38* 40* 43* 44*     Basic Metabolic Panel  Recent Labs   Lab 11/14/19 0443 11/13/19 0407 11/12/19  1645 11/12/19  1513  11/12/19  0346   * 147* 145* 144   < > 145*   POTASSIUM 3.8 4.0 3.9 4.2   < > 3.8   CHLORIDE 116* 115* 114*  --   --  114*   CO2 30 25 25  --   --  26   BUN 38* 32* 29  --   --  24   CR 1.29* 1.31* 1.25  --   --  1.22   * 128* 157* 156*   < > 123*    < > = values in this interval not displayed.     Liver Function Tests  Recent Labs   Lab 11/14/19 0443 11/14/19 0018 11/13/19  1755 11/13/19  1217 11/13/19  0653 11/13/19  0407  11/12/19  0346  11/11/19  1651   *  --   --   --   --  144*  --  222*  --  382*   *  --   --   --   --  103*  --  111*  --  127*   ALKPHOS 275*  --   --   --   --  96  --  64  --  90   BILITOTAL 1.1  --   --   --   --  1.1  --  1.8*  --  3.5*   ALBUMIN 2.4*  --   --   --   --  2.7*  --  2.4*  --  2.3*   INR  --  1.24* 1.27* 1.26* 1.26*  --    < > 1.47*   < >  --     < > = values in this interval not displayed.     Pancreatic Enzymes  Recent Labs   Lab 11/12/19  0346 11/10/19  1714   LIPASE 32*  --    AMYLASE 26* 41     Coagulation Profile  Recent Labs   Lab 11/14/19 0018 11/13/19  1755 11/13/19  1217 11/13/19  0653  11/12/19  1400  11/12/19  0346  11/11/19  1651 11/11/19  1600   INR 1.24* 1.27* 1.26* 1.26*   < > 1.46*   < > 1.47*   < >  --  1.60*   PTT  --   --   --   --   --  39*  --  57*  --  48* 48*    < > = values in this interval not displayed.         5. RADIOLOGY:   Recent Results (from the past 24 hour(s))   XR Chest Port 1 View    Narrative    XR CHEST PORT 1 VW  11/13/2019 1:56 PM      HISTORY: CVC line exchange    COMPARISON: Chest radiograph from 11/11/2019    FINDINGS: AP view of the chest. Enteric tube  courses beyond the field  of view but appears looped in the stomach. Right IJ CVC tip is at the  cavoatrial junction. Interval removal of Renfrew-Matheus catheter and  endotracheal tube.     Cardiac silhouette is stable. Increased bilateral interstitial and  airspace opacities. No significant pleural effusion or pneumothorax.  Surgical clips over the right upper quadrant.      Impression    IMPRESSION:   1. Increased bilateral interstitial and airspace opacities, likely  pulmonary edema.  2. Right IJ CVC tip is at the cavoatrial junction.    I have personally reviewed the examination and initial interpretation  and I agree with the findings.    RODERICK HENDERSON MD    Liver Transplant Follow Up Portable    Narrative    EXAMINATION: US LIVER TRANSPLANT FOLLOW UP PORTABLE, 11/13/2019 5:39  PM     COMPARISON: 11/12/2019    HISTORY: Liver transplant, postop day 2    TECHNIQUE:  Gray-scale, color Doppler and spectral flow analysis.    FINDINGS:   There is no ascites. Small right pleural effusion.    Liver:   The liver demonstrates normal homogeneous echotexture. No  evidence of a focal hepatic mass. One of the eighth 2 previously  identified fluid collections was demonstrated on today's examination--  located inferior to the right lobe is a avascular, hypoechoic fluid  collection measuring 3.7 x 0.7 x 4.0 cm, previously measuring 7.7 x  1.8 x 8.2 cm.    Bile Ducts: Both the intra- and extrahepatic biliary system are of  normal caliber.  The common bile duct measures 4.0 mm in diameter.    Gallbladder: The gallbladder is surgically absent.    Kidneys:   Right kidney:  The right kidney demonstrates normal echotexture with  no evidence of a shadowing stone, focal mass or hydronephrosis.   9.0  cm in long axis dimension.    Pancreas: Visualized portions of the pancreas are normal in  appearance.    Visualized portions of the aorta are unremarkable.    LIVER DOPPLER:  Splenic vein:  Patent continuous normal antegrade direction  flow  towards the liver, 76 cm/sec.  Extrahepatic portal vein:  Patent continuous antegrade flow, 71  cm/sec.  Portal vein at anastomosis: Patent continuous antegrade flow, 93  cm/sec.  Intrahepatic portal vein:  Patent continuous antegrade flow, 83  cm/sec.  Right portal vein flow is antegrade, measuring 89 cm/sec.  Left portal vein flow is antegrade, measuring 55 cm/sec.    Inferior vena cava: patent with flow toward the heart throughout..  IVC above anastomosis:  204 cm/sec.  IVC at anastomosis:  176 cm/sec.  Intrahepatic IVC:  44 cm/sec.  Extrahepatic IVC:  23 cm/sec.    Right, mid, left hepatic veins: Patent with flow towards the inferior  vena cava.    Extrahepatic hepatic artery: Low resistance waveform with flow towards  the liver. 88 cm/sec with resistive index 0.27.  Right hepatic artery: 37 cm/sec with resistive index 1.0.  Left hepatic artery: 42 cm/sec with resistive index 0.79.      Impression    Impression:   1.  Unremarkable grayscale appearance of the transplant liver.  2.  Decreased size of the anechoic avascular fluid collection inferior  to the right hepatic lobe. The posterior fluid collection containing  hepatic drain is not demonstrated on this examination.  3.  Increased elevation of the resistive index of the right hepatic  artery measuring up to 1.0 cm. Recommend attention on follow-up.   4.  Right pleural effusion.    [Result: Increased elevation of the right hepatic artery resistive  index, recommend short-term follow-up in one day]    Finding was identified on 11/13/2019 5:40 PM.     Dr Rodriguez was contacted by me on 11/13/2019 6:00 PM and verbalized  understanding of the result.    I have personally reviewed the examination and initial interpretation  and I agree with the findings.    GIORGI RODRIGUEZ MD       =========================================      @[email or web address] [email or web address]@

## 2019-11-14 NOTE — PLAN OF CARE
Discharge Planner OT   Patient plan for discharge: home if able  Current status: pt min assist standing for basic ADL's.   Barriers to return to prior living situation: post surgical precautions and general deconditioning.   Recommendations for discharge: TCU  Rationale for recommendations: pt below baseline in ADL I.        Entered by: Kain Guzman 11/14/2019 4:31 PM

## 2019-11-14 NOTE — PLAN OF CARE
4A  Discharge Planner PT   Patient plan for discharge: not discussed  Current status: Pt ambulated ~450' with FWW and CGA, cues for step length, posture, and breathing technique. Pt required 2-3 standing rest breaks, SpO2 >94% while on room air. Pt requires CGA-min A with sit <> stands, improved control with counting out loud. Pt sit > Supine with min A at LEs and cues for technique and breathing.  Barriers to return to prior living situation: medical status, impaired functional mobility  Recommendations for discharge: TCU  Rationale for recommendations: Pt with deficits in strength, activity tolerance, balance, cognition impacting overall functional mobility. Pt would benefit from continued therapy to address above deficits.       Entered by: Rosi Serrano 11/14/2019 12:50 PM

## 2019-11-14 NOTE — PLAN OF CARE
Discharge Planner SLP   Patient plan for discharge: Unknown  Current status: Clinical swallow eval completed per MD order. Pt presents with functional oropharyngeal swallow mechanism. Oral mech exam remarkable for lack of dentition; pt owns dentures but does not wear them to eat/drink. Assessed with thin liquids, puree, and semisolids (per pt preference; he avoids higher texture solids d/t dentition). Oral phase WFL. Pharyngeal phase WFL, no overt s/sx of aspiration. Recommend dental soft diet/thin liquids. Ensure pt is fully alert and upright for all PO, taking small bites/sips at slow rate. No additional SLP services indicated.  Barriers to return to prior living situation: None from ST perspective  Recommendations for discharge: Defer to PT/OT  Rationale for recommendations: Functional oropharyngeal swallow mechanism       Entered by: Stacey Richards 11/14/2019 4:35 PM

## 2019-11-14 NOTE — PROGRESS NOTES
11/14/19 1630   General Information   Onset Date 11/10/19   Start of Care Date 11/14/19   Referring Physician Maryanne Recio PA-C   Patient Profile Review/OT: Additional Occupational Profile Info See Profile for full history and prior level of function   Patient/Family Goals Statement None stated   Swallowing Evaluation Bedside swallow evaluation   Behaviorial Observations Alert  (Oriented)   Mode of current nutrition NPO;NJ   Respiratory Status Room air   Comments SLP: Pt is a 54 year old male with a past medical history of cirrhosis secondary to ESTRADA diagnosed in 2013, HCC 2018, HTN, HLD, GERD, BPH, and DM2 who underwent a DBD liver transplant on 11/11/19 with Dr. Ramos.  He returned to OR POD1 for ex lap, hematoma evacuation, and washout. Pt denies hx of dysphagia. Per chart review, pt was seen by OP ST for facial nerve weakness in 2018 after parotidectomy. Clinical swallow eval completed per MD order.   Clinical Swallow Evaluation   Oral Musculature generally intact   Structural Abnormalities none present   Dentition upper and lower dentures  (does not use dentures to eat)   Mucosal Quality dry   Mandibular Strength and Mobility intact   Oral Labial Strength and Mobility WFL   Lingual Strength and Mobility WFL   Buccal Strength and Mobility intact   Laryngeal Function Cough;Throat clear;Swallow;Voicing initiated   Oral Musculature Comments Grossly functional, no apparent abnormal weakness   Swallow Eval   Feeding Assistance no assistance needed   Clinical Swallow Eval: Thin Liquid Texture Trial   Mode of Presentation, Thin Liquids cup;self-fed;straw   Volume of Liquid or Food Presented 4oz water   Oral Phase of Swallow WFL   Pharyngeal Phase of Swallow intact   Diagnostic Statement WFL, no overt s/sx of aspiration   Clinical Swallow Eval: Puree Solid Texture Trial   Mode of Presentation, Puree spoon;self-fed   Volume of Puree Presented 3oz pudding   Oral Phase, Puree WFL   Pharyngeal Phase, Puree  intact   Diagnostic Statement WFL, no overt s/sx of aspiration   Clinical Swallow Eval: Semisolid Texture Trial   Mode of Presentation, Semisolid self-fed   Volume of Semisolid Food Presented 1 cracker in pudding - pt self-moistened cracker in pudding to accommodate for lack of dentition   Oral Phase, Semisolid WFL   Pharyngeal Phase, Semisolid intact   Diagnostic Statement WFL, no overt s/sx of aspiration   Esophageal Phase of Swallow   Patient reports or presents with symptoms of esophageal dysphagia No   Swallow Eval: Clinical Impressions   Skilled Criteria for Therapy Intervention No problems identified which require skilled intervention   Functional Assessment Scale (FAS) 7   Treatment Diagnosis Functional oropharyngeal swallow mechanism   Diet texture recommendations Thin liquids  (Dental soft)   Recommended Feeding/Eating Techniques small sips/bites  (upright for all PO, slow rate)   Demonstrates Need for Referral to Another Service dietitian   Risks and Benefits of Treatment have been explained. Yes   Patient, family and/or staff in agreement with Plan of Care Yes   Clinical Impression Comments SLP: Clinical swallow eval completed per MD order. Pt presents with functional oropharyngeal swallow mechanism. Oral mech exam remarkable for lack of dentition; pt owns dentures but does not wear them to eat/drink. Assessed with thin liquids, puree, and semisolids (per pt preference; he avoids higher texture solids d/t dentition). Oral phase WFL. Pharyngeal phase WFL, no overt s/sx of aspiration. Recommend dental soft diet/thin liquids. Ensure pt is fully alert and upright for all PO, taking small bites/sips at slow rate. No additional SLP services indicated.   Total Evaluation Time   Total Evaluation Time (Minutes) 16

## 2019-11-14 NOTE — PROCEDURES
Small Bowel Feeding Tube Placement Assessment  Reason for Feeding Tube Placement: replacement of ppFT 2/2 to previous ppFT clogging and unable to unclog with multiple difference attempts   Cortrak Start Time: 2:04   Cortrak End Time: 2:11  Medicine Delivered During Procedure: lidocaine  Placement Successful: Presume post-pyloric (pending AXR confirmation).  Procedure Complications: none  Final Placement Vini at exit of nare 88 cm  Face to Face time with patient: 15 min      Brenda Carrillo, RD, MS, LD  SICU: [phone]

## 2019-11-15 ENCOUNTER — APPOINTMENT (OUTPATIENT)
Dept: PHYSICAL THERAPY | Facility: CLINIC | Age: 54
DRG: 005 | End: 2019-11-15
Attending: SURGERY
Payer: MEDICARE

## 2019-11-15 ENCOUNTER — APPOINTMENT (OUTPATIENT)
Dept: OCCUPATIONAL THERAPY | Facility: CLINIC | Age: 54
DRG: 005 | End: 2019-11-15
Attending: SURGERY
Payer: MEDICARE

## 2019-11-15 ENCOUNTER — APPOINTMENT (OUTPATIENT)
Dept: GENERAL RADIOLOGY | Facility: CLINIC | Age: 54
DRG: 005 | End: 2019-11-15
Attending: SURGERY
Payer: MEDICARE

## 2019-11-15 LAB
ALBUMIN SERPL-MCNC: 2.2 G/DL (ref 3.4–5)
ALBUMIN SERPL-MCNC: 2.3 G/DL (ref 3.4–5)
ALBUMIN UR-MCNC: NEGATIVE MG/DL
ALP SERPL-CCNC: 313 U/L (ref 40–150)
ALP SERPL-CCNC: 336 U/L (ref 40–150)
ALT SERPL W P-5'-P-CCNC: 129 U/L (ref 0–70)
ALT SERPL W P-5'-P-CCNC: 132 U/L (ref 0–70)
AMMONIA PLAS-SCNC: 31 UMOL/L (ref 10–50)
ANION GAP SERPL CALCULATED.3IONS-SCNC: 5 MMOL/L (ref 3–14)
ANION GAP SERPL CALCULATED.3IONS-SCNC: 6 MMOL/L (ref 3–14)
APPEARANCE UR: CLEAR
AST SERPL W P-5'-P-CCNC: 108 U/L (ref 0–45)
AST SERPL W P-5'-P-CCNC: 117 U/L (ref 0–45)
BASOPHILS # BLD AUTO: 0 10E9/L (ref 0–0.2)
BASOPHILS NFR BLD AUTO: 0.3 %
BILIRUB DIRECT SERPL-MCNC: 0.5 MG/DL (ref 0–0.2)
BILIRUB SERPL-MCNC: 1 MG/DL (ref 0.2–1.3)
BILIRUB SERPL-MCNC: 1.2 MG/DL (ref 0.2–1.3)
BILIRUB UR QL STRIP: NEGATIVE
BUN SERPL-MCNC: 37 MG/DL (ref 7–30)
BUN SERPL-MCNC: 38 MG/DL (ref 7–30)
CALCIUM SERPL-MCNC: 7.6 MG/DL (ref 8.5–10.1)
CALCIUM SERPL-MCNC: 7.8 MG/DL (ref 8.5–10.1)
CHLORIDE SERPL-SCNC: 112 MMOL/L (ref 94–109)
CHLORIDE SERPL-SCNC: 113 MMOL/L (ref 94–109)
CO2 SERPL-SCNC: 25 MMOL/L (ref 20–32)
CO2 SERPL-SCNC: 26 MMOL/L (ref 20–32)
COLOR UR AUTO: ABNORMAL
CREAT SERPL-MCNC: 1.26 MG/DL (ref 0.66–1.25)
CREAT SERPL-MCNC: 1.28 MG/DL (ref 0.66–1.25)
DIFFERENTIAL METHOD BLD: ABNORMAL
EOSINOPHIL # BLD AUTO: 0.2 10E9/L (ref 0–0.7)
EOSINOPHIL NFR BLD AUTO: 5.7 %
ERYTHROCYTE [DISTWIDTH] IN BLOOD BY AUTOMATED COUNT: 15.3 % (ref 10–15)
GFR SERPL CREATININE-BSD FRML MDRD: 62 ML/MIN/{1.73_M2}
GFR SERPL CREATININE-BSD FRML MDRD: 63 ML/MIN/{1.73_M2}
GLUCOSE BLDC GLUCOMTR-MCNC: 115 MG/DL (ref 70–99)
GLUCOSE BLDC GLUCOMTR-MCNC: 125 MG/DL (ref 70–99)
GLUCOSE BLDC GLUCOMTR-MCNC: 125 MG/DL (ref 70–99)
GLUCOSE BLDC GLUCOMTR-MCNC: 127 MG/DL (ref 70–99)
GLUCOSE BLDC GLUCOMTR-MCNC: 128 MG/DL (ref 70–99)
GLUCOSE BLDC GLUCOMTR-MCNC: 128 MG/DL (ref 70–99)
GLUCOSE BLDC GLUCOMTR-MCNC: 130 MG/DL (ref 70–99)
GLUCOSE BLDC GLUCOMTR-MCNC: 135 MG/DL (ref 70–99)
GLUCOSE BLDC GLUCOMTR-MCNC: 138 MG/DL (ref 70–99)
GLUCOSE BLDC GLUCOMTR-MCNC: 141 MG/DL (ref 70–99)
GLUCOSE BLDC GLUCOMTR-MCNC: 144 MG/DL (ref 70–99)
GLUCOSE BLDC GLUCOMTR-MCNC: 153 MG/DL (ref 70–99)
GLUCOSE BLDC GLUCOMTR-MCNC: 162 MG/DL (ref 70–99)
GLUCOSE BLDC GLUCOMTR-MCNC: 171 MG/DL (ref 70–99)
GLUCOSE BLDC GLUCOMTR-MCNC: 175 MG/DL (ref 70–99)
GLUCOSE BLDC GLUCOMTR-MCNC: 190 MG/DL (ref 70–99)
GLUCOSE BLDC GLUCOMTR-MCNC: 192 MG/DL (ref 70–99)
GLUCOSE BLDC GLUCOMTR-MCNC: 194 MG/DL (ref 70–99)
GLUCOSE BLDC GLUCOMTR-MCNC: 199 MG/DL (ref 70–99)
GLUCOSE BLDC GLUCOMTR-MCNC: 202 MG/DL (ref 70–99)
GLUCOSE SERPL-MCNC: 134 MG/DL (ref 70–99)
GLUCOSE SERPL-MCNC: 134 MG/DL (ref 70–99)
GLUCOSE UR STRIP-MCNC: 300 MG/DL
HCT VFR BLD AUTO: 28.5 % (ref 40–53)
HGB BLD-MCNC: 9.4 G/DL (ref 13.3–17.7)
HGB UR QL STRIP: ABNORMAL
IMM GRANULOCYTES # BLD: 0 10E9/L (ref 0–0.4)
IMM GRANULOCYTES NFR BLD: 1 %
INR PPP: 1.12 (ref 0.86–1.14)
KETONES UR STRIP-MCNC: NEGATIVE MG/DL
LACTATE BLD-SCNC: 0.5 MMOL/L (ref 0.7–2)
LEUKOCYTE ESTERASE UR QL STRIP: NEGATIVE
LYMPHOCYTES # BLD AUTO: 0.4 10E9/L (ref 0.8–5.3)
LYMPHOCYTES NFR BLD AUTO: 13.1 %
MAGNESIUM SERPL-MCNC: 2 MG/DL (ref 1.6–2.3)
MCH RBC QN AUTO: 30.8 PG (ref 26.5–33)
MCHC RBC AUTO-ENTMCNC: 33 G/DL (ref 31.5–36.5)
MCV RBC AUTO: 93 FL (ref 78–100)
MONOCYTES # BLD AUTO: 0.2 10E9/L (ref 0–1.3)
MONOCYTES NFR BLD AUTO: 6.7 %
NEUTROPHILS # BLD AUTO: 2.2 10E9/L (ref 1.6–8.3)
NEUTROPHILS NFR BLD AUTO: 73.2 %
NITRATE UR QL: NEGATIVE
NRBC # BLD AUTO: 0 10*3/UL
NRBC BLD AUTO-RTO: 1 /100
PH UR STRIP: 6.5 PH (ref 5–7)
PHOSPHATE SERPL-MCNC: 1.5 MG/DL (ref 2.5–4.5)
PLATELET # BLD AUTO: 41 10E9/L (ref 150–450)
POTASSIUM SERPL-SCNC: 3.6 MMOL/L (ref 3.4–5.3)
POTASSIUM SERPL-SCNC: 3.7 MMOL/L (ref 3.4–5.3)
PROT SERPL-MCNC: 4.7 G/DL (ref 6.8–8.8)
PROT SERPL-MCNC: 4.9 G/DL (ref 6.8–8.8)
RBC # BLD AUTO: 3.05 10E12/L (ref 4.4–5.9)
RBC #/AREA URNS AUTO: 0 /HPF (ref 0–2)
SODIUM SERPL-SCNC: 143 MMOL/L (ref 133–144)
SODIUM SERPL-SCNC: 144 MMOL/L (ref 133–144)
SOURCE: ABNORMAL
SP GR UR STRIP: 1.01 (ref 1–1.03)
TACROLIMUS BLD-MCNC: 5.2 UG/L (ref 5–15)
TME LAST DOSE: NORMAL H
UROBILINOGEN UR STRIP-MCNC: NORMAL MG/DL (ref 0–2)
WBC # BLD AUTO: 3 10E9/L (ref 4–11)
WBC #/AREA URNS AUTO: <1 /HPF (ref 0–5)

## 2019-11-15 PROCEDURE — 00000146 ZZHCL STATISTIC GLUCOSE BY METER IP

## 2019-11-15 PROCEDURE — 36592 COLLECT BLOOD FROM PICC: CPT | Performed by: STUDENT IN AN ORGANIZED HEALTH CARE EDUCATION/TRAINING PROGRAM

## 2019-11-15 PROCEDURE — 80076 HEPATIC FUNCTION PANEL: CPT | Performed by: PHYSICIAN ASSISTANT

## 2019-11-15 PROCEDURE — 85610 PROTHROMBIN TIME: CPT | Performed by: PHYSICIAN ASSISTANT

## 2019-11-15 PROCEDURE — 97530 THERAPEUTIC ACTIVITIES: CPT | Mod: GO

## 2019-11-15 PROCEDURE — 25000132 ZZH RX MED GY IP 250 OP 250 PS 637: Mod: GY | Performed by: PHYSICIAN ASSISTANT

## 2019-11-15 PROCEDURE — 83605 ASSAY OF LACTIC ACID: CPT | Performed by: STUDENT IN AN ORGANIZED HEALTH CARE EDUCATION/TRAINING PROGRAM

## 2019-11-15 PROCEDURE — 36415 COLL VENOUS BLD VENIPUNCTURE: CPT | Performed by: STUDENT IN AN ORGANIZED HEALTH CARE EDUCATION/TRAINING PROGRAM

## 2019-11-15 PROCEDURE — 36592 COLLECT BLOOD FROM PICC: CPT | Performed by: PHYSICIAN ASSISTANT

## 2019-11-15 PROCEDURE — 25000125 ZZHC RX 250: Performed by: PHYSICIAN ASSISTANT

## 2019-11-15 PROCEDURE — 27210429 ZZH NUTRITION PRODUCT INTERMEDIATE LITER

## 2019-11-15 PROCEDURE — 25000132 ZZH RX MED GY IP 250 OP 250 PS 637: Mod: GY

## 2019-11-15 PROCEDURE — 97116 GAIT TRAINING THERAPY: CPT | Mod: GP

## 2019-11-15 PROCEDURE — 87517 HEPATITIS B DNA QUANT: CPT | Performed by: PHYSICIAN ASSISTANT

## 2019-11-15 PROCEDURE — 25000128 H RX IP 250 OP 636: Performed by: PHYSICIAN ASSISTANT

## 2019-11-15 PROCEDURE — 80048 BASIC METABOLIC PNL TOTAL CA: CPT | Performed by: PHYSICIAN ASSISTANT

## 2019-11-15 PROCEDURE — 80197 ASSAY OF TACROLIMUS: CPT | Performed by: PHYSICIAN ASSISTANT

## 2019-11-15 PROCEDURE — 80053 COMPREHEN METABOLIC PANEL: CPT | Performed by: STUDENT IN AN ORGANIZED HEALTH CARE EDUCATION/TRAINING PROGRAM

## 2019-11-15 PROCEDURE — 87040 BLOOD CULTURE FOR BACTERIA: CPT | Performed by: STUDENT IN AN ORGANIZED HEALTH CARE EDUCATION/TRAINING PROGRAM

## 2019-11-15 PROCEDURE — 25000125 ZZHC RX 250

## 2019-11-15 PROCEDURE — 84100 ASSAY OF PHOSPHORUS: CPT | Performed by: PHYSICIAN ASSISTANT

## 2019-11-15 PROCEDURE — G0499 HEPB SCREEN HIGH RISK INDIV: HCPCS | Performed by: PHYSICIAN ASSISTANT

## 2019-11-15 PROCEDURE — 36415 COLL VENOUS BLD VENIPUNCTURE: CPT | Performed by: PHYSICIAN ASSISTANT

## 2019-11-15 PROCEDURE — 81001 URINALYSIS AUTO W/SCOPE: CPT | Performed by: STUDENT IN AN ORGANIZED HEALTH CARE EDUCATION/TRAINING PROGRAM

## 2019-11-15 PROCEDURE — 97535 SELF CARE MNGMENT TRAINING: CPT | Mod: GO

## 2019-11-15 PROCEDURE — 97530 THERAPEUTIC ACTIVITIES: CPT | Mod: GP

## 2019-11-15 PROCEDURE — 25000131 ZZH RX MED GY IP 250 OP 636 PS 637: Mod: GY | Performed by: PHYSICIAN ASSISTANT

## 2019-11-15 PROCEDURE — 12000026 ZZH R&B TRANSPLANT

## 2019-11-15 PROCEDURE — 25000132 ZZH RX MED GY IP 250 OP 250 PS 637: Mod: GY | Performed by: STUDENT IN AN ORGANIZED HEALTH CARE EDUCATION/TRAINING PROGRAM

## 2019-11-15 PROCEDURE — 85025 COMPLETE CBC W/AUTO DIFF WBC: CPT | Performed by: PHYSICIAN ASSISTANT

## 2019-11-15 PROCEDURE — 82140 ASSAY OF AMMONIA: CPT | Performed by: STUDENT IN AN ORGANIZED HEALTH CARE EDUCATION/TRAINING PROGRAM

## 2019-11-15 PROCEDURE — 85027 COMPLETE CBC AUTOMATED: CPT | Performed by: STUDENT IN AN ORGANIZED HEALTH CARE EDUCATION/TRAINING PROGRAM

## 2019-11-15 PROCEDURE — 71045 X-RAY EXAM CHEST 1 VIEW: CPT

## 2019-11-15 PROCEDURE — 83735 ASSAY OF MAGNESIUM: CPT | Performed by: PHYSICIAN ASSISTANT

## 2019-11-15 PROCEDURE — 87086 URINE CULTURE/COLONY COUNT: CPT | Performed by: STUDENT IN AN ORGANIZED HEALTH CARE EDUCATION/TRAINING PROGRAM

## 2019-11-15 RX ORDER — ASPIRIN 81 MG/1
81 TABLET ORAL DAILY
Status: DISCONTINUED | OUTPATIENT
Start: 2019-11-15 | End: 2019-11-18

## 2019-11-15 RX ORDER — SULFAMETHOXAZOLE AND TRIMETHOPRIM 200; 40 MG/5ML; MG/5ML
80 SUSPENSION ORAL DAILY
Status: DISCONTINUED | OUTPATIENT
Start: 2019-11-15 | End: 2019-11-18

## 2019-11-15 RX ADMIN — HUMAN INSULIN 7 UNITS/HR: 100 INJECTION, SOLUTION SUBCUTANEOUS at 18:14

## 2019-11-15 RX ADMIN — SULFAMETHOXAZOLE AND TRIMETHOPRIM 80 MG: 200; 40 SUSPENSION ORAL at 09:22

## 2019-11-15 RX ADMIN — Medication 4 MG: at 18:15

## 2019-11-15 RX ADMIN — DIBASIC SODIUM PHOSPHATE, MONOBASIC POTASSIUM PHOSPHATE AND MONOBASIC SODIUM PHOSPHATE 250 MG: 852; 155; 130 TABLET ORAL at 11:56

## 2019-11-15 RX ADMIN — PIPERACILLIN SODIUM AND TAZOBACTAM SODIUM 3.38 G: 3; .375 INJECTION, POWDER, LYOPHILIZED, FOR SOLUTION INTRAVENOUS at 04:05

## 2019-11-15 RX ADMIN — Medication 4 MG: at 09:17

## 2019-11-15 RX ADMIN — MYCOPHENOLATE MOFETIL 750 MG: 200 POWDER, FOR SUSPENSION ORAL at 09:45

## 2019-11-15 RX ADMIN — ASPIRIN 81 MG: 81 TABLET, COATED ORAL at 09:47

## 2019-11-15 RX ADMIN — OXYCODONE HYDROCHLORIDE 5 MG: 5 TABLET ORAL at 22:03

## 2019-11-15 RX ADMIN — OXYCODONE HYDROCHLORIDE 5 MG: 5 TABLET ORAL at 13:41

## 2019-11-15 RX ADMIN — Medication 600 MG: at 20:05

## 2019-11-15 RX ADMIN — TENOFOVIR DISOPROXIL FUMARATE 300 MG: 300 TABLET ORAL at 09:26

## 2019-11-15 RX ADMIN — OLANZAPINE 2.5 MG: 2.5 TABLET, FILM COATED ORAL at 22:03

## 2019-11-15 RX ADMIN — CARVEDILOL 25 MG: 25 TABLET, FILM COATED ORAL at 20:17

## 2019-11-15 RX ADMIN — MULTIVITAMIN 15 ML: LIQUID ORAL at 09:25

## 2019-11-15 RX ADMIN — PREDNISONE 25 MG: 5 TABLET ORAL at 09:47

## 2019-11-15 RX ADMIN — Medication 1 MG: at 02:12

## 2019-11-15 RX ADMIN — NYSTATIN 1000000 UNITS: 500000 SUSPENSION ORAL at 16:47

## 2019-11-15 RX ADMIN — PIPERACILLIN SODIUM AND TAZOBACTAM SODIUM 3.38 G: 3; .375 INJECTION, POWDER, LYOPHILIZED, FOR SOLUTION INTRAVENOUS at 10:36

## 2019-11-15 RX ADMIN — OMEPRAZOLE 40 MG: KIT at 09:17

## 2019-11-15 RX ADMIN — PIPERACILLIN SODIUM AND TAZOBACTAM SODIUM 3.38 G: 3; .375 INJECTION, POWDER, LYOPHILIZED, FOR SOLUTION INTRAVENOUS at 22:03

## 2019-11-15 RX ADMIN — HUMAN INSULIN 4 UNITS/HR: 100 INJECTION, SOLUTION SUBCUTANEOUS at 22:32

## 2019-11-15 RX ADMIN — OXYCODONE HYDROCHLORIDE 10 MG: 5 TABLET ORAL at 02:13

## 2019-11-15 RX ADMIN — CARVEDILOL 25 MG: 25 TABLET, FILM COATED ORAL at 09:45

## 2019-11-15 RX ADMIN — PIPERACILLIN SODIUM AND TAZOBACTAM SODIUM 3.38 G: 3; .375 INJECTION, POWDER, LYOPHILIZED, FOR SOLUTION INTRAVENOUS at 16:47

## 2019-11-15 RX ADMIN — NYSTATIN 1000000 UNITS: 500000 SUSPENSION ORAL at 11:56

## 2019-11-15 RX ADMIN — NYSTATIN 1000000 UNITS: 500000 SUSPENSION ORAL at 20:05

## 2019-11-15 RX ADMIN — DIBASIC SODIUM PHOSPHATE, MONOBASIC POTASSIUM PHOSPHATE AND MONOBASIC SODIUM PHOSPHATE 250 MG: 852; 155; 130 TABLET ORAL at 20:06

## 2019-11-15 RX ADMIN — VALGANCICLOVIR HYDROCHLORIDE 450 MG: 50 POWDER, FOR SOLUTION ORAL at 09:16

## 2019-11-15 RX ADMIN — TRAZODONE HYDROCHLORIDE 50 MG: 100 TABLET ORAL at 22:03

## 2019-11-15 RX ADMIN — Medication 2 PACKET: at 09:18

## 2019-11-15 RX ADMIN — NYSTATIN 1000000 UNITS: 500000 SUSPENSION ORAL at 09:22

## 2019-11-15 RX ADMIN — HUMAN INSULIN 5.5 UNITS/HR: 100 INJECTION, SOLUTION SUBCUTANEOUS at 12:07

## 2019-11-15 RX ADMIN — AMLODIPINE BESYLATE 10 MG: 10 TABLET ORAL at 09:24

## 2019-11-15 RX ADMIN — MYCOPHENOLATE MOFETIL 750 MG: 200 POWDER, FOR SUSPENSION ORAL at 18:15

## 2019-11-15 ASSESSMENT — ACTIVITIES OF DAILY LIVING (ADL)
ADLS_ACUITY_SCORE: 15
ADLS_ACUITY_SCORE: 15
ADLS_ACUITY_SCORE: 14
ADLS_ACUITY_SCORE: 15

## 2019-11-15 NOTE — PLAN OF CARE
OT/7A - Discharge Planner OT   Patient plan for discharge: hopeful for home with assist from friend who plans to stay with friend for a few months post-op  Current status: Facilitated functional mobility to/from bathroom while pushing IV pole and contact guard assist on gait belt.  SBA and verbal cues for toilet transfer to limit pulling with UE. Pt scores 22/30 on MOCA version 8.1 indicating cognitive impairment (a score of 26 or greater is considered normal).   Barriers to return to prior living situation: cognition, post surgical precautions, activity tolerance  Recommendations for discharge: TCU at this time, pt may progress to home with assist pending LOS  Rationale for recommendations: pt would benefit from TCU stay at this time for increased safety and IND with ADL/IADL IND prior to return home       Entered by: Josefa Acuna 11/15/2019 11:36 AM

## 2019-11-15 NOTE — PROGRESS NOTES
12/26/18 1955   Oxygen Therapy   O2 Sat (%) 97 %   Pulse via Oximetry 114 beats per minute   O2 Device Heated; Hi flow nasal cannula   O2 Flow Rate (L/min) 4 l/min   O2 Temperature 87.6 °F (30.9 °C)   baby placed back on 4L [facility]NC per Dr. Saul Marsh due to periods of apnea. Baby tolerating well and shows no signs of distress at this time. Transferred to: 7-210 bed #2, from Surg/Neuro ICU (4A) at 2000. Report given to 7A RN by the AM shift. 2000 meds given.   Belongings: Sent with patient, now at bedside.   Central line removed? No, in place and infusing.   Chart and medications sent with patient: Yes, at patient bedside.   Family notified: Yes.

## 2019-11-15 NOTE — PROVIDER NOTIFICATION
2100 paged provider about lactic acid of 2.4, provider up to see patient, no interventions ordered.    0030 page provider about new onset visual hallucinations. Provider ordered new labs and prescribed melatonin. Labs WNL

## 2019-11-15 NOTE — PLAN OF CARE
Neuro: Oriented x 4 with some confusion and short term memory impairment.  Follows commands and moves all extremities equally.  Using call light appropriately.    CV: SR/ST.  BP stable once PO antihypertensives received.  SBP goal <170.  Afebrile.    Pulm: On room air.  Lungs clear.    GI: Passed by speech and advanced to mechanical soft/thin liquids.  NJ exchanged due to clogging with meds this am.  Ok to use per order.  Suppository given in addition to bowel regimen and patient had continent BM x 2.  Denies nausea.    : Some retention earlier while using urinal in bed - relieved by patient standing to void.  Continent and using commode.    Skin: Clamshell staples across abdomen open to air.  Patient declining abdominal binder.  Bilateral LUANNE drains draining serosanguinous output.    IV/Gtt: Fluids @ TKO.  Insulin gtt infusing - increased from Algorithm 2 to Algorithm 3 this evening once TF restarted and patient ate dinner.      Friend Art at bedside earlier today and updated by transplant surgeon as well as nursing staff.  All questions answered.  Report given to 7A - will transfer patient once bed is clean.

## 2019-11-15 NOTE — PLAN OF CARE
BP (!) 140/79 (BP Location: Right arm)   Pulse 90   Temp 98.1  F (36.7  C) (Oral)   Resp 18   Wt 85.8 kg (189 lb 3.2 oz)   SpO2 97%   BMI 27.94 kg/m         0700 - 1530  Hypertensive. All other VS stable on room air. Patient has intermittent incisional pain. PRN 5 mg oxy given. (see MAR). Patient denies nausea. BG insulin drip. Using algorithm 3. Urine Output - voiding adequately. Bowel Function - loose, watery stool x4. Nutrition - Tube feeds at 50 ml/hr and 30 ml flush q4h. Pt on regular diet. Drains - bilateral abdominal LUANNE drains. Both LUANNE drains have moderate amount of serosanguinous output. LUANNE dressings changed. CVC - triple lumen internal jugular dressing changed. Activity - SBA with walker. Education - Pt and friend educated on dressing cares. Plan of Care - will continue with plan of care and notify care team of any changes.

## 2019-11-15 NOTE — PROGRESS NOTES
Transplant Surgery  Inpatient Daily Progress Note  11/15/2019    Assessment & Plan: Frandy Workman is a 54 year old male with a past medical history of cirrhosis secondary to ESTRADA diagnosed in 2013, HCC 2018, HTN, HLD, GERD, BPH, and DM2 who underwent a DBD liver transplant on 11/11/19 with Dr. Ramos.  He returned to OR POD1 for ex lap, hematoma evacuation, and washout.     Graft function: Tbili 1 (from 1.1). Alk phos 313 (from 275). INR 1.12. Liver U/S yesterday demonstrating patent vessels with decreased resistive index of R HA. JPs with serosanguinous output.   Immunosuppression management:   Induction: Solumedrol taper per protocol  MMF: 750mg BID  FK:  Continue 4mg BID; tac level 5.2 (goal 10-12)  Complexity of management:Medium. Contributing factors: thrombocytopenia, anemia and induction  Hematology:   Acute blood loss anemia: Hgb as low as 5.6 prior to washout on POD 1; received 4 units PRBCs.  Hgb 7.7 POD#3; 1unit PRBC transfused. Hgb 9.4(from 10.3). Stable.  Thrombocytopenia: Plt 19 POD1; 4 units platelets transfused. HIT panel negative.  Plt 41 today; stable.  Hypofibrinogenemia: <200 POD1; 5 units cryoprecipitate given. Last fibrinogen 260. Resolved.  Hepatic artery prophylaxis: Was initially on heparin drip; discontinued d/t bleeding. ASA ordered at 81mg; plan to increase to 325mg when Plt >50.  Cardiorespiratory:   Hypertension: SBPs 140-170's. PTA Coreg increased to 25mg BID. Amlodipine 10mg daily. Monitor.  GI/Nutrition: Return of bowel function. Bowel regimen ordered: senokot-s and miralax BID.  Non-severe malnutrition in the context of chronic illness: NJ with TF at 50mL/hr (goal 60mL/hr).  Multivitamin daily. SLP evaluated yesterday; no s/sx of aspiration.  Advance to regular diet today with flynn counts.  Endocrine:   DM2 with steroid-induced hyperglycemia: -234; insulin drip titrated per nursing. PTA was on Tresiba 70 units and Metformin. Plan to transition to subcutaneous insulin  tomorrow when TF reach goal rate.   Fluid/Electrolytes:  Hypernatremia: Na up to 148 (from 147); free water flushes ordered 100cc q1h. Na 143 today; discontinue hourly flushes. Resolved.  Hypophosphatemia: Phos 1.5; replace PO 250mg BID.  : Bladder distended on U/S; PVR 12mL. No acute issues.  Infectious disease: Donor UC +E. coli; continue zosyn x7 days. Donor Hep B core positive; tenofovir initiated POD2. Recipient Hep B negative, positive surface antibody. Recheck Hep B Surface Agn and viral DNA today.  Elevated lactic acid with hallucinations: Lactic acid 2.4 and pt was experiencing visual hallucinations overnight. CXR(no e/o PNA), Blood cx (NGTD), UA(-)/UC(pending). Afebrile; VSS on RA. Recheck lactic acid 0.5. Melatonin ordered, continue trazadone, and sleep hygiene encouraged. IV dilaudid discontinued.  Neuro: Hallucinations; pt now intermittently confused (see above). Oxycodone available for pain control.  Prophylaxis: DVT (SCDs), GI (prilosec), fall, fungal (Nystatin), viral (Valcyte), pneumocystis (Bactrim)  Disposition: PT/OT ordered; 7A    Medical Decision Making: High  Subsequent visit 77976 (high level decision making)    SERA/Fellow/Resident Provider: Maryanne Recio PA-C [id number]    Faculty: Uma Moreno M.D.    __________________________________________________________________  Transplant History:    11/11/2019 (Liver), Postoperative day: 4     Interval History: History obtained from patient.  Overnight events: Patient experienced visual hallucinations overnight. He is alert and follows commands but intermittently confused. Denies nausea or vomiting. Reports BMx3 today. He reports his abdominal pain is improving.      ROS:   A 10-point review of systems was negative except as noted above.    Curent Meds:    alpha-lipoic acid  600 mg Oral Daily     amLODIPine  10 mg Oral Daily     aspirin  81 mg Oral Daily     carvedilol  25 mg Oral BID     multivitamins w/minerals  15 mL Per Feeding Tube Daily      mycophenolate  750 mg Oral BID IS    Or     mycophenolate  750 mg Oral or NG Tube BID IS     nystatin  10 mL Mouth/Throat 4x Daily     OLANZapine  2.5 mg Oral At Bedtime     omeprazole  40 mg Oral or Feeding Tube QAM AC     phosphorus tablet 250 mg  250 mg Oral BID     piperacillin-tazobactam  3.375 g Intravenous Q6H     polyethylene glycol  17 g Oral BID     [START ON 11/16/2019] predniSONE  10 mg Oral Once     protein modular  2 packet Per Feeding Tube Daily     senna-docusate  1 tablet Oral BID    Or     senna-docusate  2 tablet Oral BID     sodium chloride (PF)  3 mL Intravenous Q8H     sulfamethoxazole-trimethoprim  80 mg Oral Daily     tacrolimus  4 mg Oral BID IS    Or     tacrolimus  4 mg Oral or NG Tube BID IS     tenofovir  300 mg Oral Daily     traZODone  50 mg Oral At Bedtime     valGANciclovir  450 mg Oral Daily    Or     valGANciclovir  450 mg Oral or NG Tube Daily       Physical Exam:     Admit Weight: 79.4 kg (175 lb 1.6 oz)    Current Vitals:   BP (!) 172/91 (BP Location: Right arm)   Pulse 90   Temp 98.1  F (36.7  C) (Oral)   Resp 18   Wt 85.8 kg (189 lb 3.2 oz)   SpO2 97%   BMI 27.94 kg/m      Vital sign ranges:    Temp:  [97.7  F (36.5  C)-98.3  F (36.8  C)] 98.1  F (36.7  C)  Pulse:  [] 90  Heart Rate:  [] 96  Resp:  [18-20] 18  BP: (128-172)/() 172/91  SpO2:  [94 %-100 %] 97 %    General Appearance: in no apparent distress.   Skin: normal, warm, dry  Heart: well-perfused  Lungs: non-labored on RA  Abdomen: incision stapled, well-approximated, without erythema.  Left and right JPs with serosanguinous output.  : Carroll removed  Extremities: edema: +1 noted in BLE.  Neurologic: Alert; follows commands. Pupils 2mm.    Frailty Scores     There is no flowsheet data to display.          Data:   CMP  Recent Labs   Lab 11/15/19  0449 11/15/19  0158 11/14/19  0443  11/12/19  1513 11/12/19  1400 11/12/19  0346  11/10/19  1714    144 148*   < > 144 145* 145*   < > 140    POTASSIUM 3.6 3.7 3.8   < > 4.2 4.2 3.8   < > 4.3   CHLORIDE 112* 113* 116*   < >  --   --  114*   < > 112*   CO2 25 26 30   < >  --   --  26   < > 24   * 134* 119*   < > 156* 147* 123*   < > 154*   BUN 37* 38* 38*   < >  --   --  24   < > 18   CR 1.26* 1.28* 1.29*   < >  --   --  1.22   < > 1.18   GFRESTIMATED 63 62 62   < >  --   --  66   < > 69   GFRESTBLACK 74 72 71   < >  --   --  76   < > 80   DEBORAH 7.8* 7.6* 7.9*   < >  --   --  9.1   < > 9.3   ICAW  --   --   --   --  4.8 4.9 5.1   < >  --    MAG 2.0  --  2.1   < >  --   --  2.2   < > 2.1   PHOS 1.5*  --  2.8   < >  --   --  3.5   < > 2.5   AMYLASE  --   --   --   --   --   --  26*  --  41   LIPASE  --   --   --   --   --   --  32*  --   --    ALBUMIN 2.2* 2.3* 2.4*   < >  --   --  2.4*   < > 2.8*   BILITOTAL 1.0 1.2 1.1   < >  --   --  1.8*   < > 1.4*   ALKPHOS 313* 336* 275*   < >  --   --  64   < > 180*   * 117* 122*   < >  --   --  222*   < > 64*   * 132* 110*   < >  --   --  111*   < > 64    < > = values in this interval not displayed.     CBC  Recent Labs   Lab 11/15/19  4529 11/14/19  1241 11/14/19  0548   HGB 9.4* 10.3* 9.1*   WBC 3.0*  --  2.3*   PLT 41*  --  39*

## 2019-11-15 NOTE — PLAN OF CARE
Transferred from  at     BP (!) 151/83 (BP Location: Right arm)   Pulse 90   Temp 97.7  F (36.5  C) (Oral)   Resp 18   Wt 85.8 kg (189 lb 3.2 oz)   SpO2 94%   BMI 27.94 kg/m      VSS on RA, pt having visual hallucinations and increasingly forgetful but answers orientation questions appropriately, bed alarm on  B - 149  Labs: Triggered sepsis 2.4, no interventions. Provider reordered labs when hallucinations started. AST increased from AM, Lactic acid 0.5  Pain/Nausea: IV dilaudid x 1 and PO oxycodone x 1 for abdominal and back pain, some relief noted. Denied nausea  Diet: Mechanical/dental soft with continuous TF at 40 mL/hr, goal is 60 mL/hr  LDA: I.J. to TKO for antibiotics and insulin gtt, R LUANNE 155 mL, L LUANNE 210 mL, NJ bloody with continuous TF  GI: BM x 2  : Voiding not saving, pending UA/UC pt unable to void in urinal  Skin: incision stapled open to air  Mobility: Assist x 1 with walker  Plan: Med card and lab book started, continue to monitor

## 2019-11-16 ENCOUNTER — APPOINTMENT (OUTPATIENT)
Dept: OCCUPATIONAL THERAPY | Facility: CLINIC | Age: 54
DRG: 005 | End: 2019-11-16
Attending: SURGERY
Payer: MEDICARE

## 2019-11-16 LAB
ALBUMIN SERPL-MCNC: 2.3 G/DL (ref 3.4–5)
ALP SERPL-CCNC: 294 U/L (ref 40–150)
ALT SERPL W P-5'-P-CCNC: 146 U/L (ref 0–70)
ANION GAP SERPL CALCULATED.3IONS-SCNC: 4 MMOL/L (ref 3–14)
AST SERPL W P-5'-P-CCNC: 83 U/L (ref 0–45)
BACTERIA SPEC CULT: NO GROWTH
BASOPHILS # BLD AUTO: 0 10E9/L (ref 0–0.2)
BASOPHILS NFR BLD AUTO: 0.2 %
BILIRUB DIRECT SERPL-MCNC: 0.4 MG/DL (ref 0–0.2)
BILIRUB SERPL-MCNC: 0.9 MG/DL (ref 0.2–1.3)
BUN SERPL-MCNC: 30 MG/DL (ref 7–30)
CALCIUM SERPL-MCNC: 7.6 MG/DL (ref 8.5–10.1)
CHLORIDE SERPL-SCNC: 113 MMOL/L (ref 94–109)
CO2 SERPL-SCNC: 26 MMOL/L (ref 20–32)
CREAT SERPL-MCNC: 1.25 MG/DL (ref 0.66–1.25)
DIFFERENTIAL METHOD BLD: ABNORMAL
EOSINOPHIL # BLD AUTO: 0.2 10E9/L (ref 0–0.7)
EOSINOPHIL NFR BLD AUTO: 4.8 %
ERYTHROCYTE [DISTWIDTH] IN BLOOD BY AUTOMATED COUNT: 15 % (ref 10–15)
GFR SERPL CREATININE-BSD FRML MDRD: 64 ML/MIN/{1.73_M2}
GLUCOSE BLDC GLUCOMTR-MCNC: 102 MG/DL (ref 70–99)
GLUCOSE BLDC GLUCOMTR-MCNC: 110 MG/DL (ref 70–99)
GLUCOSE BLDC GLUCOMTR-MCNC: 116 MG/DL (ref 70–99)
GLUCOSE BLDC GLUCOMTR-MCNC: 116 MG/DL (ref 70–99)
GLUCOSE BLDC GLUCOMTR-MCNC: 117 MG/DL (ref 70–99)
GLUCOSE BLDC GLUCOMTR-MCNC: 122 MG/DL (ref 70–99)
GLUCOSE BLDC GLUCOMTR-MCNC: 128 MG/DL (ref 70–99)
GLUCOSE BLDC GLUCOMTR-MCNC: 128 MG/DL (ref 70–99)
GLUCOSE BLDC GLUCOMTR-MCNC: 130 MG/DL (ref 70–99)
GLUCOSE BLDC GLUCOMTR-MCNC: 137 MG/DL (ref 70–99)
GLUCOSE BLDC GLUCOMTR-MCNC: 147 MG/DL (ref 70–99)
GLUCOSE BLDC GLUCOMTR-MCNC: 150 MG/DL (ref 70–99)
GLUCOSE BLDC GLUCOMTR-MCNC: 151 MG/DL (ref 70–99)
GLUCOSE BLDC GLUCOMTR-MCNC: 152 MG/DL (ref 70–99)
GLUCOSE BLDC GLUCOMTR-MCNC: 153 MG/DL (ref 70–99)
GLUCOSE BLDC GLUCOMTR-MCNC: 154 MG/DL (ref 70–99)
GLUCOSE BLDC GLUCOMTR-MCNC: 154 MG/DL (ref 70–99)
GLUCOSE BLDC GLUCOMTR-MCNC: 157 MG/DL (ref 70–99)
GLUCOSE BLDC GLUCOMTR-MCNC: 158 MG/DL (ref 70–99)
GLUCOSE BLDC GLUCOMTR-MCNC: 164 MG/DL (ref 70–99)
GLUCOSE BLDC GLUCOMTR-MCNC: 166 MG/DL (ref 70–99)
GLUCOSE BLDC GLUCOMTR-MCNC: 166 MG/DL (ref 70–99)
GLUCOSE BLDC GLUCOMTR-MCNC: 169 MG/DL (ref 70–99)
GLUCOSE BLDC GLUCOMTR-MCNC: 172 MG/DL (ref 70–99)
GLUCOSE SERPL-MCNC: 124 MG/DL (ref 70–99)
HCT VFR BLD AUTO: 29.7 % (ref 40–53)
HGB BLD-MCNC: 10.1 G/DL (ref 13.3–17.7)
IMM GRANULOCYTES # BLD: 0.1 10E9/L (ref 0–0.4)
IMM GRANULOCYTES NFR BLD: 1.9 %
INR PPP: 1.11 (ref 0.86–1.14)
LYMPHOCYTES # BLD AUTO: 0.5 10E9/L (ref 0.8–5.3)
LYMPHOCYTES NFR BLD AUTO: 12.6 %
Lab: NORMAL
MAGNESIUM SERPL-MCNC: 2 MG/DL (ref 1.6–2.3)
MCH RBC QN AUTO: 31 PG (ref 26.5–33)
MCHC RBC AUTO-ENTMCNC: 34 G/DL (ref 31.5–36.5)
MCV RBC AUTO: 91 FL (ref 78–100)
MONOCYTES # BLD AUTO: 0.3 10E9/L (ref 0–1.3)
MONOCYTES NFR BLD AUTO: 7.2 %
NEUTROPHILS # BLD AUTO: 3 10E9/L (ref 1.6–8.3)
NEUTROPHILS NFR BLD AUTO: 73.3 %
NRBC # BLD AUTO: 0 10*3/UL
NRBC BLD AUTO-RTO: 0 /100
PHOSPHATE SERPL-MCNC: 1.8 MG/DL (ref 2.5–4.5)
PLATELET # BLD AUTO: 48 10E9/L (ref 150–450)
POTASSIUM SERPL-SCNC: 3.6 MMOL/L (ref 3.4–5.3)
PROT SERPL-MCNC: 5.1 G/DL (ref 6.8–8.8)
RBC # BLD AUTO: 3.26 10E12/L (ref 4.4–5.9)
SODIUM SERPL-SCNC: 143 MMOL/L (ref 133–144)
SPECIMEN SOURCE: NORMAL
WBC # BLD AUTO: 4.1 10E9/L (ref 4–11)

## 2019-11-16 PROCEDURE — 83735 ASSAY OF MAGNESIUM: CPT | Performed by: PHYSICIAN ASSISTANT

## 2019-11-16 PROCEDURE — 25000125 ZZHC RX 250: Performed by: PHYSICIAN ASSISTANT

## 2019-11-16 PROCEDURE — 00000146 ZZHCL STATISTIC GLUCOSE BY METER IP

## 2019-11-16 PROCEDURE — 25000131 ZZH RX MED GY IP 250 OP 636 PS 637: Mod: GY | Performed by: NURSE PRACTITIONER

## 2019-11-16 PROCEDURE — 25000132 ZZH RX MED GY IP 250 OP 250 PS 637: Mod: GY | Performed by: PHYSICIAN ASSISTANT

## 2019-11-16 PROCEDURE — 97535 SELF CARE MNGMENT TRAINING: CPT | Mod: GO

## 2019-11-16 PROCEDURE — 12000026 ZZH R&B TRANSPLANT

## 2019-11-16 PROCEDURE — 84100 ASSAY OF PHOSPHORUS: CPT | Performed by: PHYSICIAN ASSISTANT

## 2019-11-16 PROCEDURE — 85025 COMPLETE CBC W/AUTO DIFF WBC: CPT | Performed by: PHYSICIAN ASSISTANT

## 2019-11-16 PROCEDURE — 25000131 ZZH RX MED GY IP 250 OP 636 PS 637: Mod: GY | Performed by: PHYSICIAN ASSISTANT

## 2019-11-16 PROCEDURE — 36592 COLLECT BLOOD FROM PICC: CPT | Performed by: PHYSICIAN ASSISTANT

## 2019-11-16 PROCEDURE — 80048 BASIC METABOLIC PNL TOTAL CA: CPT | Performed by: PHYSICIAN ASSISTANT

## 2019-11-16 PROCEDURE — 27210429 ZZH NUTRITION PRODUCT INTERMEDIATE LITER

## 2019-11-16 PROCEDURE — 25000132 ZZH RX MED GY IP 250 OP 250 PS 637: Mod: GY | Performed by: NURSE PRACTITIONER

## 2019-11-16 PROCEDURE — 25000125 ZZHC RX 250

## 2019-11-16 PROCEDURE — 25000132 ZZH RX MED GY IP 250 OP 250 PS 637: Mod: GY

## 2019-11-16 PROCEDURE — 97530 THERAPEUTIC ACTIVITIES: CPT | Mod: GO

## 2019-11-16 PROCEDURE — 85610 PROTHROMBIN TIME: CPT | Performed by: PHYSICIAN ASSISTANT

## 2019-11-16 PROCEDURE — 80076 HEPATIC FUNCTION PANEL: CPT | Performed by: PHYSICIAN ASSISTANT

## 2019-11-16 PROCEDURE — 25000128 H RX IP 250 OP 636: Performed by: PHYSICIAN ASSISTANT

## 2019-11-16 RX ADMIN — TRAZODONE HYDROCHLORIDE 50 MG: 100 TABLET ORAL at 22:02

## 2019-11-16 RX ADMIN — OXYCODONE HYDROCHLORIDE 5 MG: 5 TABLET ORAL at 18:15

## 2019-11-16 RX ADMIN — Medication 4 MG: at 17:08

## 2019-11-16 RX ADMIN — NYSTATIN 1000000 UNITS: 500000 SUSPENSION ORAL at 20:18

## 2019-11-16 RX ADMIN — HUMAN INSULIN 5 UNITS/HR: 100 INJECTION, SOLUTION SUBCUTANEOUS at 18:32

## 2019-11-16 RX ADMIN — HUMAN INSULIN 6 UNITS/HR: 100 INJECTION, SOLUTION SUBCUTANEOUS at 09:10

## 2019-11-16 RX ADMIN — CARVEDILOL 25 MG: 25 TABLET, FILM COATED ORAL at 08:16

## 2019-11-16 RX ADMIN — MULTIVITAMIN 15 ML: LIQUID ORAL at 08:17

## 2019-11-16 RX ADMIN — Medication 2 PACKET: at 08:18

## 2019-11-16 RX ADMIN — OXYCODONE HYDROCHLORIDE 5 MG: 5 TABLET ORAL at 01:16

## 2019-11-16 RX ADMIN — SULFAMETHOXAZOLE AND TRIMETHOPRIM 80 MG: 200; 40 SUSPENSION ORAL at 08:16

## 2019-11-16 RX ADMIN — DIBASIC SODIUM PHOSPHATE, MONOBASIC POTASSIUM PHOSPHATE AND MONOBASIC SODIUM PHOSPHATE 250 MG: 852; 155; 130 TABLET ORAL at 08:16

## 2019-11-16 RX ADMIN — HUMAN INSULIN 9 UNITS/HR: 100 INJECTION, SOLUTION SUBCUTANEOUS at 03:42

## 2019-11-16 RX ADMIN — INSULIN ASPART: 100 INJECTION, SOLUTION INTRAVENOUS; SUBCUTANEOUS at 17:06

## 2019-11-16 RX ADMIN — HUMAN INSULIN 7 UNITS/HR: 100 INJECTION, SOLUTION SUBCUTANEOUS at 14:06

## 2019-11-16 RX ADMIN — NYSTATIN 1000000 UNITS: 500000 SUSPENSION ORAL at 13:31

## 2019-11-16 RX ADMIN — HUMAN INSULIN 4 UNITS/HR: 100 INJECTION, SOLUTION SUBCUTANEOUS at 23:09

## 2019-11-16 RX ADMIN — OXYCODONE HYDROCHLORIDE 5 MG: 5 TABLET ORAL at 09:26

## 2019-11-16 RX ADMIN — OXYCODONE HYDROCHLORIDE 5 MG: 5 TABLET ORAL at 22:03

## 2019-11-16 RX ADMIN — AMLODIPINE BESYLATE 10 MG: 10 TABLET ORAL at 08:17

## 2019-11-16 RX ADMIN — MYCOPHENOLATE MOFETIL 750 MG: 200 POWDER, FOR SUSPENSION ORAL at 17:07

## 2019-11-16 RX ADMIN — VALGANCICLOVIR HYDROCHLORIDE 450 MG: 50 POWDER, FOR SOLUTION ORAL at 08:16

## 2019-11-16 RX ADMIN — Medication 4 MG: at 08:16

## 2019-11-16 RX ADMIN — OLANZAPINE 2.5 MG: 2.5 TABLET, FILM COATED ORAL at 22:00

## 2019-11-16 RX ADMIN — MYCOPHENOLATE MOFETIL 750 MG: 200 POWDER, FOR SUSPENSION ORAL at 08:17

## 2019-11-16 RX ADMIN — PIPERACILLIN SODIUM AND TAZOBACTAM SODIUM 3.38 G: 3; .375 INJECTION, POWDER, LYOPHILIZED, FOR SOLUTION INTRAVENOUS at 11:08

## 2019-11-16 RX ADMIN — PIPERACILLIN SODIUM AND TAZOBACTAM SODIUM 3.38 G: 3; .375 INJECTION, POWDER, LYOPHILIZED, FOR SOLUTION INTRAVENOUS at 22:01

## 2019-11-16 RX ADMIN — NYSTATIN 1000000 UNITS: 500000 SUSPENSION ORAL at 17:07

## 2019-11-16 RX ADMIN — NYSTATIN 1000000 UNITS: 500000 SUSPENSION ORAL at 08:17

## 2019-11-16 RX ADMIN — Medication 600 MG: at 20:17

## 2019-11-16 RX ADMIN — INSULIN GLARGINE 60 UNITS: 100 INJECTION, SOLUTION SUBCUTANEOUS at 11:08

## 2019-11-16 RX ADMIN — ASPIRIN 81 MG: 81 TABLET, COATED ORAL at 08:17

## 2019-11-16 RX ADMIN — TENOFOVIR DISOPROXIL FUMARATE 300 MG: 300 TABLET ORAL at 08:16

## 2019-11-16 RX ADMIN — PIPERACILLIN SODIUM AND TAZOBACTAM SODIUM 3.38 G: 3; .375 INJECTION, POWDER, LYOPHILIZED, FOR SOLUTION INTRAVENOUS at 04:19

## 2019-11-16 RX ADMIN — PREDNISONE 10 MG: 5 TABLET ORAL at 08:17

## 2019-11-16 RX ADMIN — DIBASIC SODIUM PHOSPHATE, MONOBASIC POTASSIUM PHOSPHATE AND MONOBASIC SODIUM PHOSPHATE 500 MG: 852; 155; 130 TABLET ORAL at 20:19

## 2019-11-16 RX ADMIN — CARVEDILOL 25 MG: 25 TABLET, FILM COATED ORAL at 20:23

## 2019-11-16 RX ADMIN — OMEPRAZOLE 40 MG: KIT at 08:16

## 2019-11-16 RX ADMIN — PIPERACILLIN SODIUM AND TAZOBACTAM SODIUM 3.38 G: 3; .375 INJECTION, POWDER, LYOPHILIZED, FOR SOLUTION INTRAVENOUS at 17:06

## 2019-11-16 ASSESSMENT — ACTIVITIES OF DAILY LIVING (ADL)
ADLS_ACUITY_SCORE: 15

## 2019-11-16 ASSESSMENT — PAIN DESCRIPTION - DESCRIPTORS: DESCRIPTORS: ACHING;DISCOMFORT

## 2019-11-16 NOTE — PROGRESS NOTES
Transplant Surgery  Inpatient Daily Progress Note  11/16/2019    Assessment & Plan: Frandy Workman is a 54 year old male with a past medical history of cirrhosis secondary to ESTRADA diagnosed in 2013, HCC 2018, HTN, HLD, GERD, BPH, and DM2 who underwent a DBD liver transplant on 11/11/19 with Dr. Ramos.  He returned to OR POD1 for ex lap, hematoma evacuation, and washout.     Graft function: POD #5. LFTs generally trending down. Liver U/S yesterday demonstrating patent vessels with decreased resistive index of R HA. JPs with serosanguinous output.   Immunosuppression management:   Induction: Solumedrol taper per protocol  MMF: 750mg BID  FK:  Goal 10-12  Complexity of management: Medium. Contributing factors: thrombocytopenia, anemia and induction  Hematology:   Acute blood loss anemia: Hgb 10.1, last transfusion 11/14.  Thrombocytopenia: Plt 48. HIT panel negative.    Hepatic artery prophylaxis: Was initially on heparin drip; discontinued d/t bleeding. ASA ordered at 81mg; plan to increase to 325mg when Plt >50.  Cardiorespiratory:   Hypertension: SBPs 140-170's. PTA Coreg increased to 25mg BID. Amlodipine 10mg daily. Monitor.  GI/Nutrition: Return of bowel function. Bowel regimen senokot-s and miralax BID.  Non-severe malnutrition in the context of chronic illness: NJ with TF at 60mL/hr.  Multivitamin daily. Regular diet with flynn counts.  Endocrine:   DM2 with steroid-induced hyperglycemia: On insulin gtt with high rates. PTA was on Tresiba 60 units and Metformin. Will give 60 lantus this morning and leave gtt running. Add carb coverage. Start flynn counts.   Fluid/Electrolytes:  Hypernatremia: Resolved.  Hypophosphatemia: Phos 1.8; increase Phospha.  : Bladder distended on U/S; PVR 12mL. No acute issues.  Infectious disease: Afebrile  Donor UC +E. coli; continue zosyn x7 days. Donor Hep B core positive; tenofovir initiated POD2. Recipient Hep B negative, positive surface antibody. Hep B Surface Agn and  viral DNA pending.  Neuro:   Delirium: No sign of infection. Poor sleep. Melatonin ordered, continue trazadone, zyprexa, and sleep hygiene encouraged.  Prophylaxis: DVT (SCDs), GI (prilosec), fall, fungal (Nystatin), viral (Valcyte), pneumocystis (Bactrim)  Disposition: PT/OT ordered; 7A    Medical Decision Making: Medium    SERA/Fellow/Resident Provider: Mellisa Nelson NP     Faculty: Uma Moreno M.D.    __________________________________________________________________  Transplant History:    11/11/2019 (Liver), Postoperative day: 5     Interval History: History obtained from patient.  Still having intermittent hallucinations. Loose stools. Right sided incisional pain.    ROS:   A 10-point review of systems was negative except as noted above.    Curent Meds:    alpha-lipoic acid  600 mg Oral Daily     amLODIPine  10 mg Oral Daily     aspirin  81 mg Oral Daily     carvedilol  25 mg Oral BID     insulin aspart   Subcutaneous TID w/meals     insulin glargine  60 Units Subcutaneous QAM AC     multivitamins w/minerals  15 mL Per Feeding Tube Daily     mycophenolate  750 mg Oral BID IS    Or     mycophenolate  750 mg Oral or NG Tube BID IS     nystatin  10 mL Mouth/Throat 4x Daily     OLANZapine  2.5 mg Oral At Bedtime     omeprazole  40 mg Oral or Feeding Tube QAM AC     phosphorus tablet 250 mg  500 mg Oral BID     piperacillin-tazobactam  3.375 g Intravenous Q6H     polyethylene glycol  17 g Oral BID     protein modular  2 packet Per Feeding Tube Daily     senna-docusate  1 tablet Oral BID    Or     senna-docusate  2 tablet Oral BID     sodium chloride (PF)  3 mL Intravenous Q8H     sulfamethoxazole-trimethoprim  80 mg Oral Daily     tacrolimus  4 mg Oral BID IS    Or     tacrolimus  4 mg Oral or NG Tube BID IS     tenofovir  300 mg Oral Daily     traZODone  50 mg Oral At Bedtime     valGANciclovir  450 mg Oral Daily    Or     valGANciclovir  450 mg Oral or NG Tube Daily       Physical Exam:     Admit Weight: 79.4  kg (175 lb 1.6 oz)    Current Vitals:   /61 (BP Location: Right arm)   Pulse 74   Temp 97.4  F (36.3  C) (Oral)   Resp 16   Wt 82.5 kg (181 lb 14.4 oz)   SpO2 96%   BMI 26.86 kg/m      Vital sign ranges:    Temp:  [97.4  F (36.3  C)-98  F (36.7  C)] 97.4  F (36.3  C)  Pulse:  [74-83] 74  Heart Rate:  [74-92] 74  Resp:  [16] 16  BP: (102-177)/(61-91) 102/61  SpO2:  [95 %-98 %] 96 %    General Appearance: in no apparent distress.   Skin: normal, warm, dry  Heart: well-perfused  Lungs: non-labored on RA  Abdomen: incision stapled, well-approximated, without erythema.  Left and right JPs with serosanguinous output.  : Carroll removed  Extremities: edema: trace  Neurologic: A&Ox4, occasional hallucinations     Frailty Scores     There is no flowsheet data to display.          Data:   CMP  Recent Labs   Lab 11/16/19  0648 11/15/19  0449  11/12/19  1513 11/12/19  1400 11/12/19  0346  11/10/19  1714    143   < > 144 145* 145*   < > 140   POTASSIUM 3.6 3.6   < > 4.2 4.2 3.8   < > 4.3   CHLORIDE 113* 112*   < >  --   --  114*   < > 112*   CO2 26 25   < >  --   --  26   < > 24   * 134*   < > 156* 147* 123*   < > 154*   BUN 30 37*   < >  --   --  24   < > 18   CR 1.25 1.26*   < >  --   --  1.22   < > 1.18   GFRESTIMATED 64 63   < >  --   --  66   < > 69   GFRESTBLACK 74 74   < >  --   --  76   < > 80   DEBORAH 7.6* 7.8*   < >  --   --  9.1   < > 9.3   ICAW  --   --   --  4.8 4.9 5.1   < >  --    MAG 2.0 2.0   < >  --   --  2.2   < > 2.1   PHOS 1.8* 1.5*   < >  --   --  3.5   < > 2.5   AMYLASE  --   --   --   --   --  26*  --  41   LIPASE  --   --   --   --   --  32*  --   --    ALBUMIN 2.3* 2.2*   < >  --   --  2.4*   < > 2.8*   BILITOTAL 0.9 1.0   < >  --   --  1.8*   < > 1.4*   ALKPHOS 294* 313*   < >  --   --  64   < > 180*   AST 83* 108*   < >  --   --  222*   < > 64*   * 129*   < >  --   --  111*   < > 64    < > = values in this interval not displayed.     CBC  Recent Labs   Lab 11/16/19  0698  11/15/19  0449   HGB 10.1* 9.4*   WBC 4.1 3.0*   PLT 48* 41*

## 2019-11-16 NOTE — PLAN OF CARE
[id number] VS are stable. Patient endorses pain localized around ecchymosis on RLQ of abdomen and the pt was given oxycodone to reduce his pain. Patient denies nausea. Pt continues to have mild confusion. Urine Output - Pt is voiding independently and not saving. Bowel Function - Pt continues to have loose stools. Nutrition - Pt is on a regular diet with carb count in conjunction with his NJ tube feeding. Drains - Pt has NG tube infusing at 60ml/hr. Pt also has one right internal jugular with continuous insulin infusion and one TKO. Pt also has right and left LUANNE drains. Pt also has left PIV saline locked. Activity - Pt is on a bed/chair alarm but uses IV pole during ambulation but is otherwise SBA. Will continue to monitor and notify team with concerns.

## 2019-11-16 NOTE — PLAN OF CARE
OT/7A - Discharge Planner OT   Patient plan for discharge: pt hopeful for home, but appears open to rehab if needed  Current status: Facilitated functional mobility ~200 feet with FWW and contact guard on gait belt, working to increase activity tolerance needed for ADL/IADL. Facilitated standing ADL with set up and SBA required for shaving face, oral cares, and toilet transfer.  Educated on compensatory figure four method for LB dressing for increased compliance with post surgical abdominal precautions.   Barriers to return to prior living situation: post surgical precautions, cognition, activity tolerance  Recommendations for discharge: TCU at this time, pt may progress to home with assist pending LOS  Rationale for recommendations:  pt would benefit from TCU stay at this time for increased safety and IND with ADL/IADL IND prior to return home; increased confusion noted overnight        Entered by: Josefa Acuna 11/16/2019 11:12 AM

## 2019-11-16 NOTE — PLAN OF CARE
BP (!) 140/79 (BP Location: Right arm)   Pulse 90   Temp 98  F (36.7  C) (Oral)   Resp 16   Wt 82.5 kg (181 lb 14.4 oz)   SpO2 98%   BMI 26.86 kg/m       VSS on RA, afebrile. A&O x 4, forgetful at times. After 0000, pt became confused and slightly anxious. Pt expressed worry to male RN about something suggestive of sexual nature having happened with this writer (female). This writer clarified with pt that he was confused and that confusion can occur post transplant paired with lack of sleep. Pt was given the option of continuing care with male RN and he was agreeable to care from either this writer or male RN. Pt settled down when television turned off and he was able to sleep between BG checks. Endorses abdominal pain, given PRN oxycodone 5 mg x 2 with relief. Denies nausea & SOB. Clamshell incision stapled, SUDHA, no drainage. R & L JPs covered with 2x2 split gauze, CDI. Output bright red, R 325 mL, L 200 mL. Q1H BGs 102 - 171;  @ 0600 on Alg 4+. R internal jugular infusing LR TKO + insulin, NS TKO for scheduled zosyn, brown lumen NS locked + blood return. L PIV NS locked. Continuous TF @ 60 mL/hr + Q4H 30 mL free water flush. Regular diet. UO 1600 mL. Scheduled bowel maintenance meds held for loose stools. Up with walker to bathroom. Will continue to monitor and update liver team with acute changes.

## 2019-11-17 ENCOUNTER — APPOINTMENT (OUTPATIENT)
Dept: PHYSICAL THERAPY | Facility: CLINIC | Age: 54
DRG: 005 | End: 2019-11-17
Attending: SURGERY
Payer: MEDICARE

## 2019-11-17 LAB
ALBUMIN SERPL-MCNC: 2.1 G/DL (ref 3.4–5)
ALP SERPL-CCNC: 257 U/L (ref 40–150)
ALT SERPL W P-5'-P-CCNC: 127 U/L (ref 0–70)
ANION GAP SERPL CALCULATED.3IONS-SCNC: 4 MMOL/L (ref 3–14)
AST SERPL W P-5'-P-CCNC: 61 U/L (ref 0–45)
BILIRUB SERPL-MCNC: 1 MG/DL (ref 0.2–1.3)
BUN SERPL-MCNC: 30 MG/DL (ref 7–30)
CALCIUM SERPL-MCNC: 7.5 MG/DL (ref 8.5–10.1)
CHLORIDE SERPL-SCNC: 116 MMOL/L (ref 94–109)
CO2 SERPL-SCNC: 25 MMOL/L (ref 20–32)
CREAT SERPL-MCNC: 1.22 MG/DL (ref 0.66–1.25)
ERYTHROCYTE [DISTWIDTH] IN BLOOD BY AUTOMATED COUNT: 15.5 % (ref 10–15)
GFR SERPL CREATININE-BSD FRML MDRD: 66 ML/MIN/{1.73_M2}
GLUCOSE BLDC GLUCOMTR-MCNC: 109 MG/DL (ref 70–99)
GLUCOSE BLDC GLUCOMTR-MCNC: 115 MG/DL (ref 70–99)
GLUCOSE BLDC GLUCOMTR-MCNC: 116 MG/DL (ref 70–99)
GLUCOSE BLDC GLUCOMTR-MCNC: 117 MG/DL (ref 70–99)
GLUCOSE BLDC GLUCOMTR-MCNC: 118 MG/DL (ref 70–99)
GLUCOSE BLDC GLUCOMTR-MCNC: 120 MG/DL (ref 70–99)
GLUCOSE BLDC GLUCOMTR-MCNC: 122 MG/DL (ref 70–99)
GLUCOSE BLDC GLUCOMTR-MCNC: 124 MG/DL (ref 70–99)
GLUCOSE BLDC GLUCOMTR-MCNC: 124 MG/DL (ref 70–99)
GLUCOSE BLDC GLUCOMTR-MCNC: 128 MG/DL (ref 70–99)
GLUCOSE BLDC GLUCOMTR-MCNC: 132 MG/DL (ref 70–99)
GLUCOSE BLDC GLUCOMTR-MCNC: 132 MG/DL (ref 70–99)
GLUCOSE BLDC GLUCOMTR-MCNC: 134 MG/DL (ref 70–99)
GLUCOSE BLDC GLUCOMTR-MCNC: 136 MG/DL (ref 70–99)
GLUCOSE BLDC GLUCOMTR-MCNC: 144 MG/DL (ref 70–99)
GLUCOSE BLDC GLUCOMTR-MCNC: 150 MG/DL (ref 70–99)
GLUCOSE BLDC GLUCOMTR-MCNC: 156 MG/DL (ref 70–99)
GLUCOSE BLDC GLUCOMTR-MCNC: 159 MG/DL (ref 70–99)
GLUCOSE BLDC GLUCOMTR-MCNC: 166 MG/DL (ref 70–99)
GLUCOSE BLDC GLUCOMTR-MCNC: 182 MG/DL (ref 70–99)
GLUCOSE BLDC GLUCOMTR-MCNC: 189 MG/DL (ref 70–99)
GLUCOSE BLDC GLUCOMTR-MCNC: 199 MG/DL (ref 70–99)
GLUCOSE BLDC GLUCOMTR-MCNC: 98 MG/DL (ref 70–99)
GLUCOSE SERPL-MCNC: 136 MG/DL (ref 70–99)
HCT VFR BLD AUTO: 30.1 % (ref 40–53)
HGB BLD-MCNC: 9.8 G/DL (ref 13.3–17.7)
MAGNESIUM SERPL-MCNC: 2 MG/DL (ref 1.6–2.3)
MCH RBC QN AUTO: 30.5 PG (ref 26.5–33)
MCHC RBC AUTO-ENTMCNC: 32.6 G/DL (ref 31.5–36.5)
MCV RBC AUTO: 94 FL (ref 78–100)
PHOSPHATE SERPL-MCNC: 1.5 MG/DL (ref 2.5–4.5)
PLATELET # BLD AUTO: 45 10E9/L (ref 150–450)
POTASSIUM SERPL-SCNC: 4 MMOL/L (ref 3.4–5.3)
PROT SERPL-MCNC: 5 G/DL (ref 6.8–8.8)
RBC # BLD AUTO: 3.21 10E12/L (ref 4.4–5.9)
SODIUM SERPL-SCNC: 145 MMOL/L (ref 133–144)
WBC # BLD AUTO: 5 10E9/L (ref 4–11)

## 2019-11-17 PROCEDURE — 25000132 ZZH RX MED GY IP 250 OP 250 PS 637: Mod: GY

## 2019-11-17 PROCEDURE — 25000132 ZZH RX MED GY IP 250 OP 250 PS 637: Mod: GY | Performed by: PHYSICIAN ASSISTANT

## 2019-11-17 PROCEDURE — 25000128 H RX IP 250 OP 636: Performed by: PHYSICIAN ASSISTANT

## 2019-11-17 PROCEDURE — 25000125 ZZHC RX 250: Performed by: PHYSICIAN ASSISTANT

## 2019-11-17 PROCEDURE — 83735 ASSAY OF MAGNESIUM: CPT | Performed by: NURSE PRACTITIONER

## 2019-11-17 PROCEDURE — 25000125 ZZHC RX 250

## 2019-11-17 PROCEDURE — 97116 GAIT TRAINING THERAPY: CPT | Mod: GP

## 2019-11-17 PROCEDURE — 00000146 ZZHCL STATISTIC GLUCOSE BY METER IP

## 2019-11-17 PROCEDURE — 36592 COLLECT BLOOD FROM PICC: CPT | Performed by: NURSE PRACTITIONER

## 2019-11-17 PROCEDURE — 25000132 ZZH RX MED GY IP 250 OP 250 PS 637: Mod: GY | Performed by: STUDENT IN AN ORGANIZED HEALTH CARE EDUCATION/TRAINING PROGRAM

## 2019-11-17 PROCEDURE — 27210429 ZZH NUTRITION PRODUCT INTERMEDIATE LITER

## 2019-11-17 PROCEDURE — 12000026 ZZH R&B TRANSPLANT

## 2019-11-17 PROCEDURE — 84100 ASSAY OF PHOSPHORUS: CPT | Performed by: NURSE PRACTITIONER

## 2019-11-17 PROCEDURE — 97530 THERAPEUTIC ACTIVITIES: CPT | Mod: GP

## 2019-11-17 PROCEDURE — 25000131 ZZH RX MED GY IP 250 OP 636 PS 637: Mod: GY | Performed by: PHYSICIAN ASSISTANT

## 2019-11-17 PROCEDURE — 80053 COMPREHEN METABOLIC PANEL: CPT | Performed by: NURSE PRACTITIONER

## 2019-11-17 PROCEDURE — 25000132 ZZH RX MED GY IP 250 OP 250 PS 637: Mod: GY | Performed by: NURSE PRACTITIONER

## 2019-11-17 PROCEDURE — 85027 COMPLETE CBC AUTOMATED: CPT | Performed by: NURSE PRACTITIONER

## 2019-11-17 RX ORDER — SIMETHICONE 80 MG
80 TABLET,CHEWABLE ORAL EVERY 6 HOURS PRN
Status: DISCONTINUED | OUTPATIENT
Start: 2019-11-17 | End: 2019-11-20 | Stop reason: HOSPADM

## 2019-11-17 RX ORDER — OXYCODONE HYDROCHLORIDE 5 MG/1
5 TABLET ORAL EVERY 4 HOURS PRN
Status: DISCONTINUED | OUTPATIENT
Start: 2019-11-17 | End: 2019-11-20 | Stop reason: HOSPADM

## 2019-11-17 RX ADMIN — Medication 2 PACKET: at 08:47

## 2019-11-17 RX ADMIN — MYCOPHENOLATE MOFETIL 750 MG: 200 POWDER, FOR SUSPENSION ORAL at 18:09

## 2019-11-17 RX ADMIN — OXYCODONE HYDROCHLORIDE 5 MG: 5 TABLET ORAL at 04:31

## 2019-11-17 RX ADMIN — ASPIRIN 81 MG: 81 TABLET, COATED ORAL at 08:46

## 2019-11-17 RX ADMIN — HUMAN INSULIN 6 UNITS/HR: 100 INJECTION, SOLUTION SUBCUTANEOUS at 13:28

## 2019-11-17 RX ADMIN — VALGANCICLOVIR HYDROCHLORIDE 450 MG: 50 POWDER, FOR SOLUTION ORAL at 08:44

## 2019-11-17 RX ADMIN — OXYCODONE HYDROCHLORIDE 5 MG: 5 TABLET ORAL at 22:26

## 2019-11-17 RX ADMIN — DIBASIC SODIUM PHOSPHATE, MONOBASIC POTASSIUM PHOSPHATE AND MONOBASIC SODIUM PHOSPHATE 500 MG: 852; 155; 130 TABLET ORAL at 21:03

## 2019-11-17 RX ADMIN — PIPERACILLIN SODIUM AND TAZOBACTAM SODIUM 3.38 G: 3; .375 INJECTION, POWDER, LYOPHILIZED, FOR SOLUTION INTRAVENOUS at 10:37

## 2019-11-17 RX ADMIN — Medication 4 MG: at 17:06

## 2019-11-17 RX ADMIN — SIMETHICONE CHEW TAB 80 MG 80 MG: 80 TABLET ORAL at 21:10

## 2019-11-17 RX ADMIN — CARVEDILOL 25 MG: 25 TABLET, FILM COATED ORAL at 08:44

## 2019-11-17 RX ADMIN — NYSTATIN 1000000 UNITS: 500000 SUSPENSION ORAL at 08:46

## 2019-11-17 RX ADMIN — OLANZAPINE 2.5 MG: 2.5 TABLET, FILM COATED ORAL at 21:09

## 2019-11-17 RX ADMIN — Medication 600 MG: at 21:03

## 2019-11-17 RX ADMIN — Medication 4 MG: at 08:44

## 2019-11-17 RX ADMIN — INSULIN ASPART 14 UNITS: 100 INJECTION, SOLUTION INTRAVENOUS; SUBCUTANEOUS at 18:10

## 2019-11-17 RX ADMIN — INSULIN ASPART 3 UNITS: 100 INJECTION, SOLUTION INTRAVENOUS; SUBCUTANEOUS at 08:46

## 2019-11-17 RX ADMIN — NYSTATIN 1000000 UNITS: 500000 SUSPENSION ORAL at 13:31

## 2019-11-17 RX ADMIN — DIBASIC SODIUM PHOSPHATE, MONOBASIC POTASSIUM PHOSPHATE AND MONOBASIC SODIUM PHOSPHATE 500 MG: 852; 155; 130 TABLET ORAL at 13:31

## 2019-11-17 RX ADMIN — SULFAMETHOXAZOLE AND TRIMETHOPRIM 80 MG: 200; 40 SUSPENSION ORAL at 08:45

## 2019-11-17 RX ADMIN — MULTIVITAMIN 15 ML: LIQUID ORAL at 08:45

## 2019-11-17 RX ADMIN — PIPERACILLIN SODIUM AND TAZOBACTAM SODIUM 3.38 G: 3; .375 INJECTION, POWDER, LYOPHILIZED, FOR SOLUTION INTRAVENOUS at 21:49

## 2019-11-17 RX ADMIN — OMEPRAZOLE 40 MG: KIT at 08:46

## 2019-11-17 RX ADMIN — PIPERACILLIN SODIUM AND TAZOBACTAM SODIUM 3.38 G: 3; .375 INJECTION, POWDER, LYOPHILIZED, FOR SOLUTION INTRAVENOUS at 04:26

## 2019-11-17 RX ADMIN — HUMAN INSULIN 7 UNITS/HR: 100 INJECTION, SOLUTION SUBCUTANEOUS at 06:52

## 2019-11-17 RX ADMIN — TENOFOVIR DISOPROXIL FUMARATE 300 MG: 300 TABLET ORAL at 08:45

## 2019-11-17 RX ADMIN — TRAZODONE HYDROCHLORIDE 50 MG: 100 TABLET ORAL at 21:10

## 2019-11-17 RX ADMIN — PIPERACILLIN SODIUM AND TAZOBACTAM SODIUM 3.38 G: 3; .375 INJECTION, POWDER, LYOPHILIZED, FOR SOLUTION INTRAVENOUS at 17:05

## 2019-11-17 RX ADMIN — CARVEDILOL 25 MG: 25 TABLET, FILM COATED ORAL at 21:03

## 2019-11-17 RX ADMIN — MYCOPHENOLATE MOFETIL 750 MG: 200 POWDER, FOR SUSPENSION ORAL at 08:45

## 2019-11-17 RX ADMIN — DIBASIC SODIUM PHOSPHATE, MONOBASIC POTASSIUM PHOSPHATE AND MONOBASIC SODIUM PHOSPHATE 500 MG: 852; 155; 130 TABLET ORAL at 17:05

## 2019-11-17 RX ADMIN — NYSTATIN 1000000 UNITS: 500000 SUSPENSION ORAL at 21:04

## 2019-11-17 RX ADMIN — DIBASIC SODIUM PHOSPHATE, MONOBASIC POTASSIUM PHOSPHATE AND MONOBASIC SODIUM PHOSPHATE 500 MG: 852; 155; 130 TABLET ORAL at 08:45

## 2019-11-17 RX ADMIN — NYSTATIN 1000000 UNITS: 500000 SUSPENSION ORAL at 17:05

## 2019-11-17 RX ADMIN — OXYCODONE HYDROCHLORIDE 5 MG: 5 TABLET ORAL at 21:04

## 2019-11-17 RX ADMIN — AMLODIPINE BESYLATE 10 MG: 10 TABLET ORAL at 08:46

## 2019-11-17 ASSESSMENT — ACTIVITIES OF DAILY LIVING (ADL)
ADLS_ACUITY_SCORE: 15

## 2019-11-17 NOTE — CONSULTS
Brief diabetes team note: full consult to follow tomorrow AM.    54 year old male with history of TIIDM on Tresiba, Novolog 1:4g CHO and 1/25>125 AC, HS, Farxiga and Metformin PTA.     Admitted for liver transplant 11/11.   Completed steroids, last dose of Prednisone PO 10mg 11/16.   Remains on continuous tube feeds (Nutren 1.5) at 60/hour (253g CHO daily)  Diet advanced, eating some (did not eat anything for lunch today per RN). 661 cals eaten yesterday per carb counts.     Is on IV insulin with 1:15 g CHO. Primary team added Lantus with attempt to transition off, however insulin needs are still quite high:  With 80 of Lantus on board this morning, and minimal PO intake, still averaging an additional 5 units per hour (thus, total estimated basal requirement is around 200 units daily).     For today:   -increased aspart from 1:15g CHO to 1:4g CHO with meals and snacks (this is based on PTA insulin needs, likely will need even further increase to 1:3)  -continue on IV insulin, will hold further Lantus at this time and establish a new baseline with steroids having worn off, and more balanced mealtime insulin dosing.   -tomorrow, diabetes team will initiate transition off if appropriate, will need two doses of Lantus on board based upon current level of insulin requirement.  -have paged primary team with request for clarification on whether tube feeds may be cycling or discontinuing in the coming days.     Lanie Atkinson PA-C  Diabetes Management Service  Pager [phone]

## 2019-11-17 NOTE — PROGRESS NOTES
Calorie Count  Intake recorded for: 11/16  Total Kcals: 661 Total Protein: 40g  Kcals from Hospital Food: 661  Protein: 40g  Kcals from Outside Food (average):0 Protein: 0g  # Meals Recorded: 2 meals (First - 100% garden vegetable soup)      (Second - 100% scrambled eggs, oatmeal with 1% milk, brown sugar, greek yogurt, coffee)  # Supplements Recorded: 0

## 2019-11-17 NOTE — PLAN OF CARE
7A PT  Discharge Planner PT   Patient plan for discharge: Home with home care services, open to home therapies; per discussion with pt and friend Davi, they are willing to hire any form of care that is needed for pt and funds are not a concern. Davi lives in [place] and will be here until ~11/25, will then return to [place] and fly back here every few weeks  Current status: Pt with good functional progress this date. SBA for sit<>stand and transfer to chair. Pt ambulates 300ft + 200ft with SBA and no UE support, 1 minor LOB though pt able to self correct. Pt ascends/descends 24 stairs [place] with 1-2 hand rails. Seated rest break taken after stair performance 2/2 fatigue.  Barriers to return to prior living situation: Medical status, cognition, weakness  Recommendations for discharge: Home with assist + HHPT/OT  Rationale for recommendations: Pt is progressing well in regards to post-op functional mobility, would recommend assist for safe ADL/IADL performance. Pt would benefit from HHPT/OT to progress strength, endurance, balance, and safety and independence with functional mobility and ADLs.       Entered by: Na Palencia 11/17/2019 1:04 PM

## 2019-11-17 NOTE — PLAN OF CARE
/66 (BP Location: Left arm)   Pulse 74   Temp 98.3  F (36.8  C) (Oral)   Resp 16   Wt 82.5 kg (181 lb 14.4 oz)   SpO2 98%   BMI 26.86 kg/m       [id number]  VSS on RA, afebrile. A&O x 4 @ 1900, became disoriented to time and situation at ~0200. Oriented x 4 at 0445. Pt seems much more clear after getting a better night's sleep. Endorses EULALIO lateral flank pain, given PRN oxycodone 5 mg x 2 with relief. Denies nausea. Q1H BGs  on Alg 4-4+; lantus 20 units given @ 2200. Voiding spontaneously, declining use of urinal; urine occurrence x 2. BM x 1, loose brown-orange. Regular diet; continuous TF @ 60 mL/hr +Q4H 30 mL free flush. L & R LUANNE dressings CDI, output bright/bloody. L 125 mL, R 75 mL. Clamshell incision stapled, scabbing SUDHA. R internal jugular dressing reinforced with tegaderm, recommend complete replacement on day shift when pt is awake. R internal jugular infusing TKO + insulin drip, TKO for scheduled zosyn & brown lumen NS locked + blood return. L PIV removed due to discomfort with flush. Up with SBA + walker to bathroom. Continue to monitor and update liver team with acute changes.

## 2019-11-17 NOTE — PROGRESS NOTES
Transplant Surgery  Inpatient Daily Progress Note  11/17/2019    Assessment & Plan: Frandy Workman is a 54 year old male with a past medical history of cirrhosis secondary to ESTRADA diagnosed in 2013, HCC 2018, HTN, HLD, GERD, BPH, and DM2 who underwent a DBD liver transplant on 11/11/19 with Dr. Ramos.  He returned to OR POD1 for ex lap, hematoma evacuation, and washout.     Graft function: POD #6. LFTs generally trending down. Liver U/S on 11/14 demonstrated patent vessels with decreased resistive index of R HA. JPs with serosanguinous output.   Immunosuppression management:   Induction: Solumedrol taper per protocol  MMF: 750mg BID  FK:  Goal 10-12  Complexity of management: Medium. Contributing factors: thrombocytopenia, anemia and induction  Hematology:   Acute blood loss anemia: Hgb 10.1, last transfusion 11/14.  Thrombocytopenia: Plt 45. HIT panel negative.    Hepatic artery prophylaxis: Was initially on heparin drip; discontinued d/t bleeding. ASA ordered at 81mg; plan to increase to 325mg when Plt >50.  Cardiorespiratory:   Hypertension: SBPs 100-120's. PTA Coreg 25mg BID. Amlodipine 10mg daily. Monitor.  GI/Nutrition: Return of bowel function. Bowel regimen senokot-s and miralax BID.  Non-severe malnutrition in the context of chronic illness: NJ with TF at 60mL/hr.  Multivitamin daily. Regular diet with flynn counts. Encourage PO intake  Endocrine:   DM2 with steroid-induced hyperglycemia: On insulin gtt with high rates. PTA was on Tresiba 60 units and Metformin. Will give 60 lantus this morning and leave gtt running. Add carb coverage. Start flynn counts. May need endocrine consult  Fluid/Electrolytes:  Hypernatremia: Resolved.  Hypophosphatemia: Phos 1.5; increase Phospha.  : Bladder distended on U/S; PVR 12mL. No acute issues.  Infectious disease: Afebrile  Donor UC +E. coli; continue zosyn x7 days. Donor Hep B core positive; tenofovir initiated POD2. Recipient Hep B negative, positive surface  antibody. Hep B Surface Agn and viral DNA pending.  Neuro:   Delirium: No sign of infection. Poor sleep. Melatonin ordered, continue trazadone, zyprexa, and sleep hygiene encouraged.  Prophylaxis: DVT (SCDs), GI (prilosec), fall, fungal (Nystatin), viral (Valcyte), pneumocystis (Bactrim)  Disposition: PT/OT ordered; 7A    Medical Decision Making: Medium    SERA/Fellow/Resident Provider: Gonzalo Rodriguez MD    Faculty: Uma Moreno M.D.    __________________________________________________________________  Transplant History:    11/11/2019 (Liver), Postoperative day: 6     Interval History: History obtained from patient.  Hallucinations improved. Loose stools. Right sided incisional pain. Feels better overall    ROS:   A 10-point review of systems was negative except as noted above.    Curent Meds:    alpha-lipoic acid  600 mg Oral Daily     amLODIPine  10 mg Oral Daily     aspirin  81 mg Oral Daily     carvedilol  25 mg Oral BID     insulin aspart   Subcutaneous TID w/meals     insulin glargine  20 Units Subcutaneous Q24H     insulin glargine  80 Units Subcutaneous QAM AC     multivitamins w/minerals  15 mL Per Feeding Tube Daily     mycophenolate  750 mg Oral BID IS    Or     mycophenolate  750 mg Oral or NG Tube BID IS     nystatin  10 mL Mouth/Throat 4x Daily     OLANZapine  2.5 mg Oral At Bedtime     omeprazole  40 mg Oral or Feeding Tube QAM AC     phosphorus tablet 250 mg  500 mg Oral 4x Daily     piperacillin-tazobactam  3.375 g Intravenous Q6H     polyethylene glycol  17 g Oral BID     protein modular  2 packet Per Feeding Tube Daily     senna-docusate  1 tablet Oral BID    Or     senna-docusate  2 tablet Oral BID     sodium chloride (PF)  3 mL Intravenous Q8H     sulfamethoxazole-trimethoprim  80 mg Oral Daily     tacrolimus  4 mg Oral BID IS    Or     tacrolimus  4 mg Oral or NG Tube BID IS     tenofovir  300 mg Oral Daily     traZODone  50 mg Oral At Bedtime     valGANciclovir  450 mg Oral Daily     Or     valGANciclovir  450 mg Oral or NG Tube Daily       Physical Exam:     Admit Weight: 79.4 kg (175 lb 1.6 oz)    Current Vitals:   /67 (BP Location: Right arm)   Pulse 90   Temp 98.1  F (36.7  C) (Oral)   Resp 16   Wt 80.2 kg (176 lb 12.8 oz)   SpO2 98%   BMI 26.11 kg/m      Vital sign ranges:    Temp:  [97.4  F (36.3  C)-98.3  F (36.8  C)] 98.1  F (36.7  C)  Pulse:  [74-90] 90  Heart Rate:  [72-91] 72  Resp:  [16-18] 16  BP: (102-128)/(61-74) 120/67  SpO2:  [96 %-99 %] 98 %    General Appearance: in no apparent distress.   Skin: normal, warm, dry  Heart: well-perfused  Lungs: non-labored on RA  Abdomen: incision stapled, well-approximated, without erythema.  Left and right JPs with serosanguinous output.  : Carroll removed  Extremities: edema: trace  Neurologic: A&Ox4, occasional hallucinations     Frailty Scores     There is no flowsheet data to display.          Data:   CMP  Recent Labs   Lab 11/17/19  0706 11/16/19  0648  11/12/19  1513 11/12/19  1400 11/12/19  0346  11/10/19  1714   * 143   < > 144 145* 145*   < > 140   POTASSIUM 4.0 3.6   < > 4.2 4.2 3.8   < > 4.3   CHLORIDE 116* 113*   < >  --   --  114*   < > 112*   CO2 25 26   < >  --   --  26   < > 24   * 124*   < > 156* 147* 123*   < > 154*   BUN 30 30   < >  --   --  24   < > 18   CR 1.22 1.25   < >  --   --  1.22   < > 1.18   GFRESTIMATED 66 64   < >  --   --  66   < > 69   GFRESTBLACK 76 74   < >  --   --  76   < > 80   DEBORAH 7.5* 7.6*   < >  --   --  9.1   < > 9.3   ICAW  --   --   --  4.8 4.9 5.1   < >  --    MAG 2.0 2.0   < >  --   --  2.2   < > 2.1   PHOS 1.5* 1.8*   < >  --   --  3.5   < > 2.5   AMYLASE  --   --   --   --   --  26*  --  41   LIPASE  --   --   --   --   --  32*  --   --    ALBUMIN 2.1* 2.3*   < >  --   --  2.4*   < > 2.8*   BILITOTAL 1.0 0.9   < >  --   --  1.8*   < > 1.4*   ALKPHOS 257* 294*   < >  --   --  64   < > 180*   AST 61* 83*   < >  --   --  222*   < > 64*   * 146*   < >  --   --  111*    < > 64    < > = values in this interval not displayed.     CBC  Recent Labs   Lab 11/17/19  0706 11/16/19  0648   HGB 9.8* 10.1*   WBC 5.0 4.1   PLT 45* 48*

## 2019-11-18 ENCOUNTER — APPOINTMENT (OUTPATIENT)
Dept: OCCUPATIONAL THERAPY | Facility: CLINIC | Age: 54
DRG: 005 | End: 2019-11-18
Attending: SURGERY
Payer: MEDICARE

## 2019-11-18 ENCOUNTER — TELEPHONE (OUTPATIENT)
Dept: TRANSPLANT | Facility: CLINIC | Age: 54
End: 2019-11-18

## 2019-11-18 DIAGNOSIS — Z94.4 LIVER REPLACED BY TRANSPLANT (H): ICD-10-CM

## 2019-11-18 DIAGNOSIS — Z13.220 LIPID SCREENING: ICD-10-CM

## 2019-11-18 LAB
ALBUMIN SERPL-MCNC: 2.1 G/DL (ref 3.4–5)
ALP SERPL-CCNC: 258 U/L (ref 40–150)
ALT SERPL W P-5'-P-CCNC: 120 U/L (ref 0–70)
ANION GAP SERPL CALCULATED.3IONS-SCNC: 6 MMOL/L (ref 3–14)
AST SERPL W P-5'-P-CCNC: 59 U/L (ref 0–45)
BILIRUB SERPL-MCNC: 1.2 MG/DL (ref 0.2–1.3)
BUN SERPL-MCNC: 33 MG/DL (ref 7–30)
C DIFF TOX B STL QL: NEGATIVE
CALCIUM SERPL-MCNC: 7.5 MG/DL (ref 8.5–10.1)
CHLORIDE SERPL-SCNC: 112 MMOL/L (ref 94–109)
CO2 SERPL-SCNC: 23 MMOL/L (ref 20–32)
CREAT SERPL-MCNC: 1.37 MG/DL (ref 0.66–1.25)
ERYTHROCYTE [DISTWIDTH] IN BLOOD BY AUTOMATED COUNT: 16 % (ref 10–15)
GFR SERPL CREATININE-BSD FRML MDRD: 57 ML/MIN/{1.73_M2}
GLUCOSE BLDC GLUCOMTR-MCNC: 102 MG/DL (ref 70–99)
GLUCOSE BLDC GLUCOMTR-MCNC: 104 MG/DL (ref 70–99)
GLUCOSE BLDC GLUCOMTR-MCNC: 108 MG/DL (ref 70–99)
GLUCOSE BLDC GLUCOMTR-MCNC: 116 MG/DL (ref 70–99)
GLUCOSE BLDC GLUCOMTR-MCNC: 120 MG/DL (ref 70–99)
GLUCOSE BLDC GLUCOMTR-MCNC: 121 MG/DL (ref 70–99)
GLUCOSE BLDC GLUCOMTR-MCNC: 124 MG/DL (ref 70–99)
GLUCOSE BLDC GLUCOMTR-MCNC: 126 MG/DL (ref 70–99)
GLUCOSE BLDC GLUCOMTR-MCNC: 131 MG/DL (ref 70–99)
GLUCOSE BLDC GLUCOMTR-MCNC: 135 MG/DL (ref 70–99)
GLUCOSE BLDC GLUCOMTR-MCNC: 135 MG/DL (ref 70–99)
GLUCOSE BLDC GLUCOMTR-MCNC: 137 MG/DL (ref 70–99)
GLUCOSE BLDC GLUCOMTR-MCNC: 139 MG/DL (ref 70–99)
GLUCOSE BLDC GLUCOMTR-MCNC: 149 MG/DL (ref 70–99)
GLUCOSE BLDC GLUCOMTR-MCNC: 157 MG/DL (ref 70–99)
GLUCOSE BLDC GLUCOMTR-MCNC: 163 MG/DL (ref 70–99)
GLUCOSE BLDC GLUCOMTR-MCNC: 166 MG/DL (ref 70–99)
GLUCOSE BLDC GLUCOMTR-MCNC: 166 MG/DL (ref 70–99)
GLUCOSE BLDC GLUCOMTR-MCNC: 206 MG/DL (ref 70–99)
GLUCOSE BLDC GLUCOMTR-MCNC: 222 MG/DL (ref 70–99)
GLUCOSE BLDC GLUCOMTR-MCNC: 77 MG/DL (ref 70–99)
GLUCOSE BLDC GLUCOMTR-MCNC: 81 MG/DL (ref 70–99)
GLUCOSE BLDC GLUCOMTR-MCNC: 85 MG/DL (ref 70–99)
GLUCOSE BLDC GLUCOMTR-MCNC: 88 MG/DL (ref 70–99)
GLUCOSE BLDC GLUCOMTR-MCNC: 92 MG/DL (ref 70–99)
GLUCOSE SERPL-MCNC: 89 MG/DL (ref 70–99)
HBV SURFACE AG SERPL QL IA: NONREACTIVE
HCT VFR BLD AUTO: 30.7 % (ref 40–53)
HGB BLD-MCNC: 9.9 G/DL (ref 13.3–17.7)
MAGNESIUM SERPL-MCNC: 2 MG/DL (ref 1.6–2.3)
MCH RBC QN AUTO: 30.7 PG (ref 26.5–33)
MCHC RBC AUTO-ENTMCNC: 32.2 G/DL (ref 31.5–36.5)
MCV RBC AUTO: 95 FL (ref 78–100)
PHOSPHATE SERPL-MCNC: 1.7 MG/DL (ref 2.5–4.5)
PLATELET # BLD AUTO: 50 10E9/L (ref 150–450)
POTASSIUM SERPL-SCNC: 4.1 MMOL/L (ref 3.4–5.3)
PROT SERPL-MCNC: 5.2 G/DL (ref 6.8–8.8)
RBC # BLD AUTO: 3.22 10E12/L (ref 4.4–5.9)
SODIUM SERPL-SCNC: 141 MMOL/L (ref 133–144)
SPECIMEN SOURCE: NORMAL
TACROLIMUS BLD-MCNC: 7.5 UG/L (ref 5–15)
TME LAST DOSE: NORMAL H
WBC # BLD AUTO: 5.7 10E9/L (ref 4–11)

## 2019-11-18 PROCEDURE — 25000132 ZZH RX MED GY IP 250 OP 250 PS 637: Mod: GY | Performed by: STUDENT IN AN ORGANIZED HEALTH CARE EDUCATION/TRAINING PROGRAM

## 2019-11-18 PROCEDURE — 25000132 ZZH RX MED GY IP 250 OP 250 PS 637: Mod: GY | Performed by: PHYSICIAN ASSISTANT

## 2019-11-18 PROCEDURE — 36415 COLL VENOUS BLD VENIPUNCTURE: CPT | Performed by: NURSE PRACTITIONER

## 2019-11-18 PROCEDURE — 25000125 ZZHC RX 250: Performed by: PHYSICIAN ASSISTANT

## 2019-11-18 PROCEDURE — 25000132 ZZH RX MED GY IP 250 OP 250 PS 637: Mod: GY | Performed by: NURSE PRACTITIONER

## 2019-11-18 PROCEDURE — 25000132 ZZH RX MED GY IP 250 OP 250 PS 637: Mod: GY | Performed by: SURGERY

## 2019-11-18 PROCEDURE — 83735 ASSAY OF MAGNESIUM: CPT | Performed by: NURSE PRACTITIONER

## 2019-11-18 PROCEDURE — 80197 ASSAY OF TACROLIMUS: CPT | Performed by: PHYSICIAN ASSISTANT

## 2019-11-18 PROCEDURE — 97530 THERAPEUTIC ACTIVITIES: CPT | Mod: GO

## 2019-11-18 PROCEDURE — 25000131 ZZH RX MED GY IP 250 OP 636 PS 637: Mod: GY | Performed by: PHYSICIAN ASSISTANT

## 2019-11-18 PROCEDURE — 85027 COMPLETE CBC AUTOMATED: CPT | Performed by: NURSE PRACTITIONER

## 2019-11-18 PROCEDURE — 12000026 ZZH R&B TRANSPLANT

## 2019-11-18 PROCEDURE — 25000131 ZZH RX MED GY IP 250 OP 636 PS 637: Mod: GY | Performed by: SURGERY

## 2019-11-18 PROCEDURE — 00000146 ZZHCL STATISTIC GLUCOSE BY METER IP

## 2019-11-18 PROCEDURE — 84100 ASSAY OF PHOSPHORUS: CPT | Performed by: NURSE PRACTITIONER

## 2019-11-18 PROCEDURE — 80053 COMPREHEN METABOLIC PANEL: CPT | Performed by: NURSE PRACTITIONER

## 2019-11-18 PROCEDURE — 87493 C DIFF AMPLIFIED PROBE: CPT | Performed by: PHYSICIAN ASSISTANT

## 2019-11-18 PROCEDURE — 25000128 H RX IP 250 OP 636: Performed by: PHYSICIAN ASSISTANT

## 2019-11-18 PROCEDURE — 40000141 ZZH STATISTIC PERIPHERAL IV START W/O US GUIDANCE

## 2019-11-18 RX ORDER — TRAZODONE HYDROCHLORIDE 50 MG/1
50 TABLET, FILM COATED ORAL AT BEDTIME
Status: DISCONTINUED | OUTPATIENT
Start: 2019-11-18 | End: 2019-11-20 | Stop reason: HOSPADM

## 2019-11-18 RX ORDER — GUAR GUM
1 PACKET (EA) ORAL 2 TIMES DAILY
Status: DISCONTINUED | OUTPATIENT
Start: 2019-11-18 | End: 2019-11-19

## 2019-11-18 RX ORDER — PREDNISONE 5 MG/1
5 TABLET ORAL DAILY
Status: DISCONTINUED | OUTPATIENT
Start: 2019-11-18 | End: 2019-11-20

## 2019-11-18 RX ORDER — MULTIPLE VITAMINS W/ MINERALS TAB 9MG-400MCG
1 TAB ORAL DAILY
Status: DISCONTINUED | OUTPATIENT
Start: 2019-11-18 | End: 2019-11-20 | Stop reason: HOSPADM

## 2019-11-18 RX ORDER — TACROLIMUS 1 MG/1
4 CAPSULE ORAL
Status: DISCONTINUED | OUTPATIENT
Start: 2019-11-18 | End: 2019-11-18

## 2019-11-18 RX ORDER — CARVEDILOL 25 MG/1
25 TABLET ORAL 2 TIMES DAILY WITH MEALS
Status: DISCONTINUED | OUTPATIENT
Start: 2019-11-18 | End: 2019-11-20 | Stop reason: HOSPADM

## 2019-11-18 RX ORDER — TAMSULOSIN HYDROCHLORIDE 0.4 MG/1
0.4 CAPSULE ORAL AT BEDTIME
Status: DISCONTINUED | OUTPATIENT
Start: 2019-11-18 | End: 2019-11-20 | Stop reason: HOSPADM

## 2019-11-18 RX ORDER — NYSTATIN 100000/ML
500000 SUSPENSION, ORAL (FINAL DOSE FORM) ORAL 4 TIMES DAILY
Status: DISCONTINUED | OUTPATIENT
Start: 2019-11-18 | End: 2019-11-20 | Stop reason: HOSPADM

## 2019-11-18 RX ORDER — SULFAMETHOXAZOLE AND TRIMETHOPRIM 400; 80 MG/1; MG/1
1 TABLET ORAL DAILY
Status: DISCONTINUED | OUTPATIENT
Start: 2019-11-18 | End: 2019-11-20 | Stop reason: HOSPADM

## 2019-11-18 RX ADMIN — TACROLIMUS 5.5 MG: 5 CAPSULE ORAL at 17:59

## 2019-11-18 RX ADMIN — HUMAN INSULIN 2 UNITS/HR: 100 INJECTION, SOLUTION SUBCUTANEOUS at 01:11

## 2019-11-18 RX ADMIN — HUMAN INSULIN 4 UNITS/HR: 100 INJECTION, SOLUTION SUBCUTANEOUS at 04:59

## 2019-11-18 RX ADMIN — PIPERACILLIN SODIUM AND TAZOBACTAM SODIUM 3.38 G: 3; .375 INJECTION, POWDER, LYOPHILIZED, FOR SOLUTION INTRAVENOUS at 03:59

## 2019-11-18 RX ADMIN — SIMETHICONE CHEW TAB 80 MG 80 MG: 80 TABLET ORAL at 12:44

## 2019-11-18 RX ADMIN — INSULIN HUMAN 45 UNITS: 100 INJECTION, SUSPENSION SUBCUTANEOUS at 20:12

## 2019-11-18 RX ADMIN — INSULIN ASPART 4 UNITS: 100 INJECTION, SOLUTION INTRAVENOUS; SUBCUTANEOUS at 17:56

## 2019-11-18 RX ADMIN — CARVEDILOL 25 MG: 25 TABLET, FILM COATED ORAL at 17:59

## 2019-11-18 RX ADMIN — PREDNISONE 5 MG: 5 TABLET ORAL at 12:28

## 2019-11-18 RX ADMIN — MYCOPHENOLATE MOFETIL 750 MG: 250 CAPSULE ORAL at 17:59

## 2019-11-18 RX ADMIN — HUMAN INSULIN 4 UNITS/HR: 100 INJECTION, SOLUTION SUBCUTANEOUS at 20:22

## 2019-11-18 RX ADMIN — DIBASIC SODIUM PHOSPHATE, MONOBASIC POTASSIUM PHOSPHATE AND MONOBASIC SODIUM PHOSPHATE 500 MG: 852; 155; 130 TABLET ORAL at 08:38

## 2019-11-18 RX ADMIN — PIPERACILLIN SODIUM AND TAZOBACTAM SODIUM 3.38 G: 3; .375 INJECTION, POWDER, LYOPHILIZED, FOR SOLUTION INTRAVENOUS at 16:19

## 2019-11-18 RX ADMIN — ASPIRIN 325 MG: 325 TABLET, DELAYED RELEASE ORAL at 08:45

## 2019-11-18 RX ADMIN — TAMSULOSIN HYDROCHLORIDE 0.4 MG: 0.4 CAPSULE ORAL at 22:13

## 2019-11-18 RX ADMIN — CARVEDILOL 25 MG: 25 TABLET, FILM COATED ORAL at 08:42

## 2019-11-18 RX ADMIN — Medication 1 PACKET: at 20:14

## 2019-11-18 RX ADMIN — INSULIN ASPART 20 UNITS: 100 INJECTION, SOLUTION INTRAVENOUS; SUBCUTANEOUS at 08:28

## 2019-11-18 RX ADMIN — TENOFOVIR DISOPROXIL FUMARATE 300 MG: 300 TABLET ORAL at 08:38

## 2019-11-18 RX ADMIN — TACROLIMUS 4 MG: 1 CAPSULE ORAL at 08:43

## 2019-11-18 RX ADMIN — TRAZODONE HYDROCHLORIDE 50 MG: 50 TABLET ORAL at 22:13

## 2019-11-18 RX ADMIN — Medication 2 PACKET: at 08:51

## 2019-11-18 RX ADMIN — INSULIN ASPART 8 UNITS: 100 INJECTION, SOLUTION INTRAVENOUS; SUBCUTANEOUS at 12:29

## 2019-11-18 RX ADMIN — NYSTATIN 500000 UNITS: 100000 SUSPENSION ORAL at 16:19

## 2019-11-18 RX ADMIN — MYCOPHENOLATE MOFETIL 750 MG: 250 CAPSULE ORAL at 08:43

## 2019-11-18 RX ADMIN — NYSTATIN 500000 UNITS: 100000 SUSPENSION ORAL at 20:12

## 2019-11-18 RX ADMIN — DIBASIC SODIUM PHOSPHATE, MONOBASIC POTASSIUM PHOSPHATE AND MONOBASIC SODIUM PHOSPHATE 500 MG: 852; 155; 130 TABLET ORAL at 16:19

## 2019-11-18 RX ADMIN — MULTIPLE VITAMINS W/ MINERALS TAB 1 TABLET: TAB at 08:42

## 2019-11-18 RX ADMIN — OMEPRAZOLE 40 MG: 20 CAPSULE, DELAYED RELEASE ORAL at 08:45

## 2019-11-18 RX ADMIN — DIBASIC SODIUM PHOSPHATE, MONOBASIC POTASSIUM PHOSPHATE AND MONOBASIC SODIUM PHOSPHATE 500 MG: 852; 155; 130 TABLET ORAL at 12:28

## 2019-11-18 RX ADMIN — NYSTATIN 1000000 UNITS: 500000 SUSPENSION ORAL at 12:29

## 2019-11-18 RX ADMIN — DIBASIC SODIUM PHOSPHATE, MONOBASIC POTASSIUM PHOSPHATE AND MONOBASIC SODIUM PHOSPHATE 500 MG: 852; 155; 130 TABLET ORAL at 20:11

## 2019-11-18 RX ADMIN — SULFAMETHOXAZOLE AND TRIMETHOPRIM 1 TABLET: 400; 80 TABLET ORAL at 08:42

## 2019-11-18 RX ADMIN — VALGANCICLOVIR 450 MG: 450 TABLET, FILM COATED ORAL at 08:42

## 2019-11-18 RX ADMIN — HUMAN INSULIN 5 UNITS/HR: 100 INJECTION, SOLUTION SUBCUTANEOUS at 11:14

## 2019-11-18 RX ADMIN — PIPERACILLIN SODIUM AND TAZOBACTAM SODIUM 3.38 G: 3; .375 INJECTION, POWDER, LYOPHILIZED, FOR SOLUTION INTRAVENOUS at 10:27

## 2019-11-18 RX ADMIN — AMLODIPINE BESYLATE 10 MG: 10 TABLET ORAL at 08:39

## 2019-11-18 RX ADMIN — Medication 600 MG: at 20:11

## 2019-11-18 RX ADMIN — NYSTATIN 1000000 UNITS: 500000 SUSPENSION ORAL at 08:39

## 2019-11-18 ASSESSMENT — ACTIVITIES OF DAILY LIVING (ADL)
ADLS_ACUITY_SCORE: 14
ADLS_ACUITY_SCORE: 15
ADLS_ACUITY_SCORE: 14

## 2019-11-18 NOTE — CONSULTS
"Diabetes/Hyperglycemia Management Consult    Chief Complaint type 2 diabetes, high insulin needs and enteral feeds  Consult requested by: Mellisa Nelson NP  History of Present Illness Mr. Miller Workman is a 53 yo man with a history of type 2 diabetes complicated by peripheral neuropathy and retinopathy, cirrhosis secondary to ESTRADA, HCC, HTN, HLD, GERD, BPH, who is s/p DBD liver transplant on 11/11/19.  He returned to OR on POD1 for ex lap, hematoma evacuation and washout.    Miller follows with Dr. Wilcox at [facility] for his diabetes.  Prior to admission he was taking Tresiba 60-70 units qAM, aspart 1unit/4g CHO, 1unit/25 mg/dL for BG >125, Farxiga 10mg daily and metformin ER 500mg with supper.  His glucoses on this regimen have ranged 80s-300s, but usually run in the low 100s.  His hemoglobin A1c was 6.6% in August (at that point he had been having more frequent hypoglycemia and his Tresiba was decreased from 80 to 70 units, but since his carb coverage has increased considerably from 1unit/8g to 1unit/4g CHO).    This admission he was started on IV Insulin and aspart 1 unit/15g CHO.  He is also on continuous TFs: Nutren 1.5 at 60ml/h continuous.  IV insulin rates were higher (often 8 units/h) so glargine was added in attempts to transition to subcutaneous insulin.  With glargine 80 units on board yesterday, and meal aspart increased to 1unit/4g CHO in the afternoon, his IV insulin rates were still often at 8-9 units/h but at times as low as 2 units/h with BG 80s- 120s.  Today TFs went off around 0800 and will cycled tonight: Nutren 1.5 at 60ml/h x 12h from 8p-8a.  With TFs off today IV insulin needs seem to be decreasing (3units/h currently).    Miller is feeling pretty good, \"just a little sore\".  Appetite is good but he estimates his po intake is a little below his baseline at home b/c he is getting full from TFs.    Recent Labs   Lab 11/18/19  1300 11/18/19  1204 11/18/19  1056 11/18/19  1002 " 11/18/19  0905 11/18/19  0830  11/18/19  0437  11/17/19  0706  11/16/19  0648  11/15/19  0449  11/15/19  0158  11/14/19  0443   GLC  --   --   --   --   --   --   --  89  --  136*  --  124*  --  134*  --  134*  --  119*   * 88 137* 157* 222* 206*   < >  --    < >  --    < >  --    < >  --    < >  --    < >  --     < > = values in this interval not displayed.         Diabetes Type:   Type 2 Diabetes  Diabetes Duration: diagnosed age 29 yr (x25y), started on insulin 18 years ago  Usual Diabetes Regimen:   Tresiba 60-70 units qAM  aspart 1unit/4g CHO  aspart 1unit/25 for BG >125  Metformin ER 500mg with supper  Farxiga 10mg daily  Benjamin Flash for glucose monitoring and calculating aspart doses- aiming for before meals and bedtime.  Ability to [place] Prescribed Regimen: good  Diabetes Control:   Lab Results   Component Value Date    A1C 6.6 08/08/2018    A1C 6.5 06/09/2017    A1C 7.8 10/25/2016     Diabetes Complications: retinopathy, peripheral neuropathy  Able to Detect Hypoglycemia: yes, with BG of 60 or less  Usual Diabetes Care Provider: Dr. Wilcox at [facility]  Factors Impacting Glucose Control: continuous enteral feeds- cycling starting today, insulin resistance at baseline with increased insulin needs off home Farixga and metformin, and now on tacrolimus , Cellcept and prednisone (tapered to 5mg daily as of today).      Review of Systems  10 point ROS completed with pertinent positives and negatives noted in the HPI    Past medical, family and social histories are reviewed and updated.    Past Medical History  Past Medical History:   Diagnosis Date     Anemia 2013    Low blood plates current is 37     Arthritis      BPH (benign prostatic hyperplasia)      CAD (coronary artery disease) 4/1/2019     Cholelithiasis      Conductive hearing loss 8/16/2017    Have a lump on my right side of my face.  Had wax discharge     Depressive disorder 1986    Suffer effects throughout life      Gastroesophageal reflux disease 2014    Being treated with Prilosac     HCC (hepatocellular carcinoma) (H) 2019     History of diabetic retinopathy 2018     HTN (hypertension)      Hyperlipidemia      Liver cirrhosis secondary to ESTRADA (H)      Portal vein thrombosis 2019     Type II diabetes mellitus (H)     Insulin adminstered BID daily.        Family History  Family History   Problem Relation Age of Onset     Prostate Cancer Maternal Grandfather      Substance Abuse Maternal Grandfather         Alcohol     Colon Cancer Father 60     Pancreatic Cancer Father 60     Prostate Cancer Father      Colorectal Cancer Father      Macular Degeneration Father      Cancer Father      Glaucoma Father      Skin Cancer Father      Colorectal Cancer Maternal Grandmother      Cancer Maternal Grandmother      Substance Abuse Maternal Grandmother         Alcohol     Colorectal Cancer Paternal Grandmother      Cancer Mother      Diabetes Mother          3/2016     Cerebrovascular Disease Mother         Passed away in Feb of this year, 80 years old.     Thyroid Disease Mother      Depression Mother      Asthma Sister         Had since birth     Thyroid Disease Sister      Depression Sister      Liver Disease No family hx of      Melanoma No family hx of        Social History  Social History     Socioeconomic History     Marital status:      Spouse name: Not on file     Number of children: Not on file     Years of education: Not on file     Highest education level: Not on file   Occupational History     Not on file   Social Needs     Financial resource strain: Not on file     Food insecurity:     Worry: Not on file     Inability: Not on file     Transportation needs:     Medical: Not on file     Non-medical: Not on file   Tobacco Use     Smoking status: Never Smoker     Smokeless tobacco: Former User     Types: Chew     Tobacco comment: 1 tin per week   Substance and Sexual Activity     Alcohol use: No      Alcohol/week: 0.0 standard drinks     Comment: quit Sept. 1996     Drug use: No     Sexual activity: Not Currently     Partners: Female     Birth control/protection: Condom   Lifestyle     Physical activity:     Days per week: Not on file     Minutes per session: Not on file     Stress: Not on file   Relationships     Social connections:     Talks on phone: Not on file     Gets together: Not on file     Attends Quaker service: Not on file     Active member of club or organization: Not on file     Attends meetings of clubs or organizations: Not on file     Relationship status: Not on file     Intimate partner violence:     Fear of current or ex partner: Not on file     Emotionally abused: Not on file     Physically abused: Not on file     Forced sexual activity: Not on file   Other Topics Concern     Parent/sibling w/ CABG, MI or angioplasty before 65F 55M? Yes   Social History Narrative     Not on file   Miller lives alone in [place].  He is a .  He has a daughter who lives nearby but is not in contact with him.  He has been on disability since 2014, prior to this he was the  for different start ups.      Physical Exam  /63 (BP Location: Left arm)   Pulse 86   Temp 98.2  F (36.8  C) (Oral)   Resp 18   Wt 80.2 kg (176 lb 12.8 oz)   SpO2 96%   BMI 26.11 kg/m      General:  pleasant man, resting in bed, in no distress.   HEENT: NC/AT, PER and anicteric, non-injected, oral mucous membranes moist.   Lungs: unlabored respiration, no cough  Skin: warm and dry, no obvious lesions  MSK:  fluid movement of all extremities  Lymp: bilateral LE edema   Mental status:  alert, oriented x3, communicating clearly  Psych:  calm, even mood    Laboratory  Recent Labs   Lab Test 11/18/19 0437 11/17/19  0706    145*   POTASSIUM 4.1 4.0   CHLORIDE 112* 116*   CO2 23 25   ANIONGAP 6 4   GLC 89 136*   BUN 33* 30   CR 1.37* 1.22   DEBORAH 7.5* 7.5*     CBC RESULTS:   Recent Labs   Lab Test 11/18/19 0437   WBC 5.7    RBC 3.22*   HGB 9.9*   HCT 30.7*   MCV 95   MCH 30.7   MCHC 32.2   RDW 16.0*   PLT 50*       Liver Function Studies -   Recent Labs   Lab Test 11/18/19  0437   PROTTOTAL 5.2*   ALBUMIN 2.1*   BILITOTAL 1.2   ALKPHOS 258*   AST 59*   *       Active Medications  Current Facility-Administered Medications   Medication     alpha-lipoic acid capsule 600 mg     amLODIPine (NORVASC) tablet 10 mg     aspirin (ASA) EC tablet 325 mg     bisacodyl (DULCOLAX) Suppository 10 mg     carvedilol (COREG) tablet 25 mg     dextrose 10 % 1,000 mL infusion     glucose gel 15-30 g    Or     dextrose 50 % injection 25-50 mL    Or     glucagon injection 1 mg     fiber modular (NUTRISOURCE FIBER) packet 1 packet     insulin 1 unit/mL in saline (NovoLIN, HumuLIN Regular) drip - ADULT IV Infusion     insulin aspart (NovoLOG) inj (RAPID ACTING)     insulin aspart (NovoLOG) inj (RAPID ACTING)     multivitamin w/minerals (THERA-VIT-M) tablet 1 tablet     mycophenolate (GENERIC EQUIVALENT) capsule 750 mg     naloxone (NARCAN) injection 0.1-0.4 mg     nystatin (MYCOSTATIN) suspension 1,000,000 Units     OLANZapine (zyPREXA) tablet 2.5 mg     omeprazole (priLOSEC) CR capsule 40 mg     ondansetron (ZOFRAN-ODT) ODT tab 4-8 mg     oxyCODONE (ROXICODONE) tablet 5 mg     phosphorus tablet 250 mg (PHOSPHA 250 NEUTRAL) per tablet 500 mg     piperacillin-tazobactam (ZOSYN) 3.375 g vial to attach to  mL bag     polyethylene glycol (MIRALAX/GLYCOLAX) Packet 17 g     predniSONE (DELTASONE) tablet 5 mg     prochlorperazine (COMPAZINE) injection 10 mg    Or     prochlorperazine (COMPAZINE) Suppository 25 mg     protein modular (PROSOURCE TF) 2 packet     senna-docusate (SENOKOT-S/PERICOLACE) 8.6-50 MG per tablet 1 tablet    Or     senna-docusate (SENOKOT-S/PERICOLACE) 8.6-50 MG per tablet 2 tablet     simethicone (MYLICON) chewable tablet 80 mg     sodium chloride (PF) 0.9% PF flush 3 mL     sodium chloride (PF) 0.9% PF flush 3 mL      sulfamethoxazole-trimethoprim (BACTRIM/SEPTRA) 400-80 MG per tablet 1 tablet     tacrolimus (GENERIC EQUIVALENT) capsule 5.5 mg     tenofovir (VIREAD) tablet 300 mg     traZODone (DESYREL) tablet 50 mg     valGANciclovir (VALCYTE) tablet 450 mg     No current outpatient medications on file.       Current Diet  Orders Placed This Encounter      Regular Diet Adult        Assessment    Mr. Miller Workman is a 55 yo man with a history of type 2 diabetes complicated by peripheral neuropathy and retinopathy, cirrhosis secondary to ESTRADA, HCC, HTN, HLD, GERD, BPH, who is s/p DBD liver transplant on 11/11/19.     Insulin resistant at baseline, on higher doses of insulin and 2 oral DM agents PTA.  Now with higher IV Insulin requirements while on enteral feeds and anti-rejection medications.    Plan  -NPH 45 units qPM at 2000 start of cycled TFs, please hold if TFs do not run.  -Please keep IV Insulin infusion running overnight, we will plan to transition to subcutaneous insulin tomorrow morning.  So far, it appears pt may be settling down to close to her home basal insulin dose (~60 units).  -aspart for meals and snacks: continue 1unit/4g CHO at this time  - we will start correction aspart tomorrow when we transition to subcutaneous insulin.  -hourly glucose checks.    We will continue to follow.    She Soares PA-C [phone]  Diabetes Management Team Job Code [id number]  I spent a total of 80 minutes bedside and on the inpatient unit managing the glycemic care of Frandy Workman. Over 50% of my time on the unit was spent counseling the patient and/or coordinating care regarding glucose management in the context of insulin resistant type 2 diabetes, now on steroids and cycled TFs with IV Insulin needs, starting transition from IV to subcutaneous insulin.  See note for details.

## 2019-11-18 NOTE — PROGRESS NOTES
Transplant Social Work Services Progress Note      Date of Initial Social Work Evaluation: 19  Collaborated with: Desirae Case RN Care Coordinator, Gayle Jones-[facility] liaison, Bam Clark-Pharmacy and Radha Ndiaye RN Transplant Coordinator    Data: Miller is 7 days post  donor liver transplant.  Intervention/education: I met with Miller and his friend/care giver Davi Saunders.  We reviewed care giver expectations post discharge from the hospital and expected clinic follow up.  We discussed skilled home care services and private pay.  I provided education about medication copays for immunosuppression medications and valganciclovir.  Assessment: Art is asking appropriately questions trying to understand how long to stay in [place].  I strongly encouraged him to stay with patient  the first week Miller is home from the hospital, and to attend his first two appointments in clinic.  I discussed the limitations of private hire home care services.    Plan:    Discharge Plans in Progress: anticipating home with skilled home care and care giving provided by friend Davi    Barriers to d/c plan: medical stability    Follow up Plan: I will remain involved throughout patient's hospitalization, and will also meet with patient when he returns to [facility].      ALEXYS Mcnair, [facility]  Liver Transplant   Phone [phone]  Pager [phone]

## 2019-11-18 NOTE — PROGRESS NOTES
Calorie Count  Intake recorded for: 11/17  Total Kcals: 973 Total Protein: 58g  Kcals from Hospital Food: 973  Protein: 58g  Kcals from Outside Food (average):0 Protein: 0g  # Meals Recorded: 3 meals (First - 100% coffee, milk, oatmeal, Greek yogurt, scrambled eggs)      (Second - 100% iced tea, 50% scrambled eggs, 25% orange, less than 25% English muffin)      (Third - 100% ice cream, diet lemonade, garden vegetable soup, side mac & cheese, 50% mashed potatoes w/ gravy)  # Supplements Recorded: 0

## 2019-11-18 NOTE — PROGRESS NOTES
Transplant Surgery  Inpatient Daily Progress Note  11/18/2019    Assessment & Plan: Frandy Workman is a 54 year old male with a past medical history of cirrhosis secondary to ESTRADA diagnosed in 2013, HCC 2018, HTN, HLD, GERD, BPH, and DM2 who underwent a DBD liver transplant on 11/11/19 with Dr. Ramos.  He returned to OR POD1 for ex lap, hematoma evacuation, and washout.     Graft function: POD #8. LFTs stable. Tbili 1.2(from 1). Liver U/S on 11/14 demonstrated patent vessels with decreased resistive index of R HA. JPs with serosanguinous output.   Immunosuppression management:   Induction: Solumedrol taper per protocol. Restart prednisone 5mg daily until tac level therapeutic.   MMF: 750mg BID  FK: Increase to 5.5mg BID. Tac level 7.5 (11.5hr trough); Goal 10-12.  Complexity of management: Medium. Contributing factors: thrombocytopenia, anemia and induction  Hematology:   Acute blood loss anemia: Hgb 9.9 (from 9.8), last transfusion 11/14.  Thrombocytopenia: Plt 50. HIT panel negative.    Hepatic artery prophylaxis: Was initially on heparin drip; discontinued d/t bleeding. ASA ordered at 81mg; plan to increase to 325mg when Plt >50.  Cardiorespiratory:   Hypertension: SBPs 100-140's. PTA Coreg 25mg BID. Amlodipine 10mg daily. Monitor.  GI/Nutrition:   Diarrhea: Likely TF-induced vs. Antibiotic-induced vs. Infectious. Bowel regimen senokot-s and miralax BID on hold. Fiber added. C-diff ordered; result pending.  Encouraged increased water intake today due to increase in Cr 1.37 (from 1.22) with diarrhea.   Non-severe malnutrition in the context of chronic illness: NJ with TF at 60mL/hr; plan to cycle today.  Multivitamin daily. Regular diet with flynn counts (973kcals yesterday). Encourage PO intake.  Endocrine:   DM2 with steroid-induced hyperglycemia: PTA was on Tresiba 60 units and Metformin. On insulin gtt with high rates. Endocrine consulted; appreciate recommendations.   Fluid/Electrolytes:  Hypernatremia:  Resolved.  Hypophosphatemia: Phos 1.7; continue Phospha to QID.  : Bladder distended on U/S; PVR 12mL. No acute issues.  Infectious disease: Tmax 99.6 x2 overnight.  Remove internal jugular line today.  Donor UC +E. coli; zosyn x7 days completed today. Donor Hep B core positive; tenofovir initiated POD2. Recipient Hep B negative, positive surface antibody. Hep B Surface Agn and viral DNA pending.  Neuro:   Delirium: Poor sleep. Continue melatonin, trazadone, and zyprexa. Sleep hygiene encouraged. Now resolved.  Prophylaxis: DVT (SCDs), GI (prilosec), fall, fungal (Nystatin), viral (Valcyte), pneumocystis (Bactrim)  Disposition: PT/OT ordered; 7A    Medical Decision Making: Medium    SERA/Fellow/Resident Provider: Maryanne Recio PA-C    Faculty: Berlin Hernandez M.D.    __________________________________________________________________  Transplant History:    11/11/2019 (Liver), Postoperative day: 7     Interval History: History obtained from patient.  Patient is no longer experiencing hallucinations. He reports increased pain overnight related to diarrhea. He denies nausea, vomiting. Reports increased belching. Appetite improving; eating four small meals per day.    ROS:   A 10-point review of systems was negative except as noted above.    Curent Meds:    alpha-lipoic acid  600 mg Oral Daily     amLODIPine  10 mg Oral Daily     aspirin  325 mg Oral Daily     carvedilol  25 mg Oral BID w/meals     fiber modular (NUTRISOURCE FIBER)  1 packet Per Feeding Tube BID     insulin aspart   Subcutaneous TID w/meals     multivitamin w/minerals  1 tablet Oral Daily     mycophenolate  750 mg Oral BID IS     nystatin  10 mL Mouth/Throat 4x Daily     OLANZapine  2.5 mg Oral At Bedtime     omeprazole  40 mg Oral QAM AC     phosphorus tablet 250 mg  500 mg Oral 4x Daily     piperacillin-tazobactam  3.375 g Intravenous Q6H     polyethylene glycol  17 g Oral BID     protein modular  2 packet Per Feeding Tube Daily     senna-docusate  1  tablet Oral BID    Or     senna-docusate  2 tablet Oral BID     sodium chloride (PF)  3 mL Intravenous Q8H     sulfamethoxazole-trimethoprim  1 tablet Oral Daily     tacrolimus  4 mg Oral BID IS     tenofovir  300 mg Oral Daily     traZODone  50 mg Oral At Bedtime     valGANciclovir  450 mg Oral Daily       Physical Exam:     Admit Weight: 79.4 kg (175 lb 1.6 oz)    Current Vitals:   /66 (BP Location: Left arm)   Pulse 86   Temp 98.4  F (36.9  C) (Oral)   Resp 18   Wt 80.2 kg (176 lb 12.8 oz)   SpO2 96%   BMI 26.11 kg/m      Vital sign ranges:    Temp:  [98  F (36.7  C)-99.6  F (37.6  C)] 98.4  F (36.9  C)  Pulse:  [86] 86  Heart Rate:  [83-96] 95  Resp:  [14-18] 18  BP: (100-145)/(54-71) 110/66  SpO2:  [94 %-100 %] 96 %    General Appearance: in no apparent distress.   Skin: normal, warm, dry  Heart: well-perfused  Lungs: non-labored on RA  Abdomen: incision stapled, well-approximated, without erythema.  Left and right JPs with serosanguinous output.  : Carroll removed  Extremities: edema: trace  Neurologic: A&Ox4    Frailty Scores     There is no flowsheet data to display.          Data:   CMP  Recent Labs   Lab 11/18/19  0437 11/17/19  0706  11/12/19  1513 11/12/19  1400 11/12/19  0346    145*   < > 144 145* 145*   POTASSIUM 4.1 4.0   < > 4.2 4.2 3.8   CHLORIDE 112* 116*   < >  --   --  114*   CO2 23 25   < >  --   --  26   GLC 89 136*   < > 156* 147* 123*   BUN 33* 30   < >  --   --  24   CR 1.37* 1.22   < >  --   --  1.22   GFRESTIMATED 57* 66   < >  --   --  66   GFRESTBLACK 66 76   < >  --   --  76   DEBORAH 7.5* 7.5*   < >  --   --  9.1   ICAW  --   --   --  4.8 4.9 5.1   MAG 2.0 2.0   < >  --   --  2.2   PHOS 1.7* 1.5*   < >  --   --  3.5   AMYLASE  --   --   --   --   --  26*   LIPASE  --   --   --   --   --  32*   ALBUMIN 2.1* 2.1*   < >  --   --  2.4*   BILITOTAL 1.2 1.0   < >  --   --  1.8*   ALKPHOS 258* 257*   < >  --   --  64   AST 59* 61*   < >  --   --  222*   * 127*   < >  --    --  111*    < > = values in this interval not displayed.     CBC  Recent Labs   Lab 11/18/19  0437 11/17/19  0706   HGB 9.9* 9.8*   WBC 5.7 5.0   PLT 50* 45*     Attestation:    The patient has been seen and evaluated by me.   Vital signs, labs, medications and orders were reviewed.   When obtained, diagnostic images were reviewed by me and interpreted as above.    The care plan was discussed with the multidisciplinary team and I agree with the findings and plan in this note, with any differences recorded in blue.    Immunosuppressive medication management was reviewed and adjusted as reflected in the note and orders.     .

## 2019-11-18 NOTE — PLAN OF CARE
/54 (BP Location: Right arm)   Pulse 86   Temp 99.6  F (37.6  C) (Oral)   Resp 16   Wt 80.2 kg (176 lb 12.8 oz)   SpO2 94%   BMI 26.11 kg/m       [id number]:  Tmax 99.6, AOVSS on RA. A&O x 4. Endorses abdominal discomfort, given PRN oxy 5 mg x 2 with relief. Denies nausea. Q1H BGs  on Alg 4+. L & R LUANNE dressings changed, CDI. L 200 mL, R 125 mL bright/bloody output. Clamshell incision SUDHA, stapled; cleansed with microklenz. Up with SBA to bathroom, urine occurrence overnight x 3+, BM x 3. BM loose/watery, orange-brown. TF @ 60 mL/hr + Q4H 40 mL free water flush. Regular diet. R internal jugular TKO + insulin, TKO for scheduled Q6H IV zosyn. Regular diet, calorie counts, CHO counts with supplemental insulin coverage. Continue to monitor and update liver team with acute changes.

## 2019-11-18 NOTE — TELEPHONE ENCOUNTER
Pharmacist provided medication teaching for discharge with a focus on new medications/dose changes.  The discharge medication list was reviewed with the patient/family and the following points were discussed, as applicable: Name, description, purpose, dose/strength, duration of medications, common side effects, food/medications to avoid, action to be taken if dose is missed, when to call MD, safe disposal of unused medications and how to obtain refills.  Also present was friend, Art.     Patient chooses to receive medications from  specialty pharmacy.    Pharmacist gave bp monitor, thermometer and 7 day pill organizer.       Clinical Pharmacy Consult:                                                      Transplant Specific:   Date of Transplant: 11/11/2019  Type of Transplant: liver  First Transplant: yes  History of rejection: no    Immunosuppression Regimen   TAC 4mg qAM & 4mg qPM and MMF 750mg qAM & 750mg qPM  Immunosuppressant Levels:  Subtherapeutic  Patient specific goal: tac: 10-12, mmf: 1-3.5  Pt adherent to lab draws: yes  Scr:   Lab Results   Component Value Date    CR 1.37 11/18/2019     Side effects: Diarrhea    Prophylactic Medications  Antibacterial:  Bactrim 400/80 once daily  Scheduled Discontinue Date: 6 months    Antifungal: Nystatin  Scheduled Discontinue Date: at time of discharge from hospital post liver transplant.     Antiviral: CrCl >/=60 mL/minute: Valcyte 450mg daily per program guidelines.   Scheduled Discontinue Date: 6 months    Acid Reducer: Prilosec (omeprazole)  Scheduled Reviewed Date: 8 weeks.     Thrombosis Prevention: Aspirin 325 mg PO daily  Scheduled Discontinue Date: per md     Blood Pressure Management  Frequency of home Blood Pressure checks: once daily  Most recent home BP: 117/63  Patient Blood pressure goal: <130/80  Patient blood pressure at goal:  yes  Hospitalizations/ER visits since last assessment: 0      Med rec/DUR performed: yes  Med Rec Discrepancies:  no    Reminders:    1.  Bring to first clinic appt: med box, med card, bp monitor, all medications being taken, and lab book.  2.    MTM pharmacist visit on first clinic appt and if ok, again in 3 to 4 months during follow up appt.  3.   Avoid Grapefruit and Grapefruit juice.   4.   Avoid herbal supplements. If wish to take other medications or supplements, call your coordinator.   5.   Keep lab appts.   6.  Can use apps on phone like [employer] to help manage medication lists and reminders.   7.  Make sure you are protecting your skin by wearing long sleeves and applying sunscreen to exposed skin, for any significant time in the sun.     Transplant Coordinator is Radha Clark RPH

## 2019-11-18 NOTE — PLAN OF CARE
VSS.  Sats well on ra.  Continues on insulin drip with hourly checks.  BGs labile.  Getting carb. coverage as well as Lantus dose BID.  Eating well.  Wants to get off TF so has been eating 100% of trays.  TF at goal of 60 ml/hr.  Multiple liquid stools today.  Up frequently with 1 assist to get to BR.  C/o gas pains.  Voiding with BMs.  Continues on bed alarm and chair alarm although mentation improved today.  Less confusion noted.,  Wants to be off alarms but urgency may cause impulsive behavior.  Working with therapy.  Moving well with SBA.  Friend Art at bedside supportive.  Continue to monitor, support.

## 2019-11-18 NOTE — PROGRESS NOTES
"CLINICAL NUTRITION SERVICES - REASSESSMENT NOTE     Nutrition Prescription    RECOMMENDATIONS FOR MDs/PROVIDERS TO ORDER:  Recommend patient to consistently consume at least 1500 kcal, 75 grams of protein daily (~60% of needs) prior to discontinuing FT/TF.      Malnutrition Status:    Non-severe malnutrition in the context of chronic illness    Recommendations already ordered by Registered Dietitian (RD):  Change TF to cycled regimen: Nutren 1.5 @ 60 ml/hr x 12 hours (8p-8a)  Continue 2 pkts Prosource TF daily  -> provides ~50% of assessed needs    Add Nutrisource Fiber 1 pkt BID (adds 6 grams soluble fiber)    Boost Glucose Control with lunch meal + HS snack times    Continue calorie counts 11/18-11/21       EVALUATION OF THE PROGRESS TOWARD GOALS   Diet: Regular  Nutrition Support: Nutren 1.5 @ 60 ml/hr continuous    Intake: Patient has received a 5 day average of 780 ml TF daily + 2 pkts Prosource TF, providing 1250 kcal and 75 grams protein (~50% needs)     PO intake: He reports poor appetite and early satiety.  Calorie counts yesterday show patient consumed 973 kcal and 58 grams protein (~40% needs).  PO intake has been improving and patient is attempting to eat 3 meals/day.      NEW FINDINGS   Labs: -250's, Phos 1.7 (low)    Meds: Insulin gtt, Novolog 1 unit per 4 grams CHO at meals/snacks, MVI with minerals    Nutrition History:  Patient reports poor appetite leading up to surgery.  His typical PO intake is very regimented.  He generally eats a egg/coy sandwich for breakfast along with a lean cuisine for lunch and dinner.  He has tried Boost/Ensure in the past but \"it wasn't worth it\" for what it did to his blood sugars at the time.    Weight: Current weight close to admission weight.  Driest BW this admission was 77.1 kg (11/11).  Will update nutrition DW to this.  Weight loss of ~20# (10.5%) over the last 9 months prior to admission.     GI: Patient with loose stools (4-9x/day) per I/O. "     MALNUTRITION  % Intake: < 75% for > 7 days (non-severe)  % Weight Loss: Up to 20% in 1 year (non-severe)  Subcutaneous Fat Loss: Facial region, Upper arm and Lower arm: mild-moderate  Muscle Loss: Facial & jaw region:  Mild-moderate, Upper arm (bicep, tricep): mild-moderate, Lower arm  (forearm): moderate, Upper leg (quadricep, hamstring), Patellar region and Posterior calf: moderate  Fluid Accumulation/Edema: None noted  Malnutrition Diagnosis: Non-severe malnutrition in the context of chronic illness    Previous Goals   Total avg nutritional intake to meet a minimum of 25 kcal/kg and 1.3 g PRO/kg daily (per dosing wt 83 kg).  Evaluation: Not met    Previous Nutrition Diagnosis  Inadequate oral intake  Evaluation: Improving    CURRENT NUTRITION DIAGNOSIS  Inadequate oral intake related to poor appetite, early satiety as evidenced by patient consuming 40% of assessed needs per calorie counts.     INTERVENTIONS  Implementation  Collaboration with other providers - Discussed PO intake, cycle TF recs with team    Enteral Nutrition - Modify schedule - see above    Medical food supplement therapy - see above    Nutrition education for recommended modifications - provided post-transplant diet guidelines education. Discussed high calorie/protein needs (provided tips for ways to increase intake of these without elevated BG too high), appropriate oral supplements, heart healthy diet guidelines - encouraged low sodium, low saturated fat food choices, food safety precautions discussed.      Goals  Total avg nutritional intake to meet a minimum of 30 kcal/kg and 1.5 g PRO/kg daily (per dosing wt 77 kg).    PO intake of at least 1500 kcal and 75 grams of protein daily (~60% needs)    Monitoring/Evaluation  Progress toward goals will be monitored and evaluated per protocol.    Wendy Crandall MS, RD, LD  Pager [phone]

## 2019-11-18 NOTE — PLAN OF CARE
OT/7A - Discharge Planner OT   Patient plan for discharge: home with hired help  Current status: Facilitated functional mobility to/from rehab gym with contact guard assist on gait belt. Pt able to independently and correctly set up 4/5 medications; pt requires assist for remaining 1/5 medications for set up.   Barriers to return to prior living situation: post surgical precautions, activity tolerance, cognition  Recommendations for discharge: home with increased assist and home therapy, would recommend supervision with medication set up   Rationale for recommendations: Pt reports comfort with plan for discharge to home. Pt has demonstrated significant progress during inpatient therapy, but would benefit from home therapy to increase safety and IND with ADL/IADL upon return home.       Entered by: Josefa Acuna 11/18/2019 8:36 AM

## 2019-11-18 NOTE — PROGRESS NOTES
Care Coordinator  D: Frandy Workman is a 54 year old male with a past medical history of cirrhosis secondary to ESTRADA diagnosed in 2013, HCC 2018, HTN, HLD, GERD, BPH, and DM2 who underwent a DBD liver transplant on 11/11/19 with Dr. Ramos.  He returned to OR POD1 for ex lap, hematoma evacuation, and washout--note per LILLIAN Oconnor, today.  I: Per chart review, PT and OT are recommending home with  PT and HH OT. Pt has an NJT with 60ml/hour--cyccled. Caden counts are low.  Pt on IVAB for +UC.  P: I called [facility] [phone] June--asked her to check enteral and IVAB coverage for home. Per June, pt does NOT meet criteria for enteral at this time--estimate for self pay quote per day is $26.61 and pt does NOT meet criteria for home iVAB and self quote is $47.81 for Zosyn. If pt is not homebound it is $155/RN visit.  I will inform LILLIAN Oconnor.  Per chart, pt has a friend, Art, that is here to stay with him until 11/15 and then he will return to [place]. I will meet with pt today.

## 2019-11-18 NOTE — PLAN OF CARE
Status: s/p DBD liver transplant on 11/11/19 (per PA note).  Vitals: VSS, RA.  Neuros: A&O x4, forgetful.   IV: PIV SL. Triple lumen CVC. Insulin drip continued. BG ranged from . Insulin drip currently stopped since 1400 BG check.  Resp/trach: LS clear & equal. Lower LS diminished.  Diet: Regular diet, fair appetite. Continuous enteric feedings discontinued. The plan is to do cycled feedings at night and allow pt to eat during the day.  Bowel status: BS+, loose BM's. Held bowel meds. C. diff results came back (-).  : Voiding spontaneously.  Skin: Incisional staples SUDHA, CDI. LUANNE x 2 dressing CDI. Bilateral nares are dry with dried blood. Providers paged.  Pain: Dull and constant incisional pain, declined interventions. PRN simethicone given after lunch for cramping with relief.  Activity: SBA. Ambulated the halls with PT today. Up to the chair for meals. Ambulated to the bathroom in room.  Social: No visitors.  Plan: NPH to be started per endocrine team. Continue to monitor and follow POC.

## 2019-11-19 ENCOUNTER — APPOINTMENT (OUTPATIENT)
Dept: PHYSICAL THERAPY | Facility: CLINIC | Age: 54
DRG: 005 | End: 2019-11-19
Attending: SURGERY
Payer: MEDICARE

## 2019-11-19 ENCOUNTER — TELEPHONE (OUTPATIENT)
Dept: TRANSPLANT | Facility: CLINIC | Age: 54
End: 2019-11-19

## 2019-11-19 ENCOUNTER — TELEPHONE (OUTPATIENT)
Dept: ENDOCRINOLOGY | Facility: CLINIC | Age: 54
End: 2019-11-19

## 2019-11-19 ENCOUNTER — APPOINTMENT (OUTPATIENT)
Dept: OCCUPATIONAL THERAPY | Facility: CLINIC | Age: 54
DRG: 005 | End: 2019-11-19
Attending: SURGERY
Payer: MEDICARE

## 2019-11-19 LAB
ALBUMIN SERPL-MCNC: 2.1 G/DL (ref 3.4–5)
ALP SERPL-CCNC: 203 U/L (ref 40–150)
ALT SERPL W P-5'-P-CCNC: 87 U/L (ref 0–70)
ANION GAP SERPL CALCULATED.3IONS-SCNC: 7 MMOL/L (ref 3–14)
AST SERPL W P-5'-P-CCNC: 40 U/L (ref 0–45)
BILIRUB SERPL-MCNC: 0.8 MG/DL (ref 0.2–1.3)
BUN SERPL-MCNC: 32 MG/DL (ref 7–30)
CALCIUM SERPL-MCNC: 7.6 MG/DL (ref 8.5–10.1)
CHLORIDE SERPL-SCNC: 113 MMOL/L (ref 94–109)
CO2 SERPL-SCNC: 21 MMOL/L (ref 20–32)
CREAT SERPL-MCNC: 1.46 MG/DL (ref 0.66–1.25)
ERYTHROCYTE [DISTWIDTH] IN BLOOD BY AUTOMATED COUNT: 16.4 % (ref 10–15)
GFR SERPL CREATININE-BSD FRML MDRD: 53 ML/MIN/{1.73_M2}
GLUCOSE BLDC GLUCOMTR-MCNC: 107 MG/DL (ref 70–99)
GLUCOSE BLDC GLUCOMTR-MCNC: 110 MG/DL (ref 70–99)
GLUCOSE BLDC GLUCOMTR-MCNC: 119 MG/DL (ref 70–99)
GLUCOSE BLDC GLUCOMTR-MCNC: 120 MG/DL (ref 70–99)
GLUCOSE BLDC GLUCOMTR-MCNC: 122 MG/DL (ref 70–99)
GLUCOSE BLDC GLUCOMTR-MCNC: 123 MG/DL (ref 70–99)
GLUCOSE BLDC GLUCOMTR-MCNC: 126 MG/DL (ref 70–99)
GLUCOSE BLDC GLUCOMTR-MCNC: 133 MG/DL (ref 70–99)
GLUCOSE BLDC GLUCOMTR-MCNC: 134 MG/DL (ref 70–99)
GLUCOSE BLDC GLUCOMTR-MCNC: 134 MG/DL (ref 70–99)
GLUCOSE BLDC GLUCOMTR-MCNC: 155 MG/DL (ref 70–99)
GLUCOSE BLDC GLUCOMTR-MCNC: 157 MG/DL (ref 70–99)
GLUCOSE BLDC GLUCOMTR-MCNC: 159 MG/DL (ref 70–99)
GLUCOSE BLDC GLUCOMTR-MCNC: 169 MG/DL (ref 70–99)
GLUCOSE BLDC GLUCOMTR-MCNC: 186 MG/DL (ref 70–99)
GLUCOSE BLDC GLUCOMTR-MCNC: 202 MG/DL (ref 70–99)
GLUCOSE BLDC GLUCOMTR-MCNC: 79 MG/DL (ref 70–99)
GLUCOSE SERPL-MCNC: 141 MG/DL (ref 70–99)
HBV DNA SERPL NAA+PROBE-ACNC: NORMAL [IU]/ML
HBV DNA SERPL NAA+PROBE-LOG IU: NORMAL {LOG_IU}/ML
HCT VFR BLD AUTO: 27.4 % (ref 40–53)
HGB BLD-MCNC: 9 G/DL (ref 13.3–17.7)
MAGNESIUM SERPL-MCNC: 2.2 MG/DL (ref 1.6–2.3)
MCH RBC QN AUTO: 30.8 PG (ref 26.5–33)
MCHC RBC AUTO-ENTMCNC: 32.8 G/DL (ref 31.5–36.5)
MCV RBC AUTO: 94 FL (ref 78–100)
PHOSPHATE SERPL-MCNC: 3.2 MG/DL (ref 2.5–4.5)
PLATELET # BLD AUTO: 59 10E9/L (ref 150–450)
POTASSIUM SERPL-SCNC: 4.1 MMOL/L (ref 3.4–5.3)
PROT SERPL-MCNC: 5.4 G/DL (ref 6.8–8.8)
RBC # BLD AUTO: 2.92 10E12/L (ref 4.4–5.9)
SODIUM SERPL-SCNC: 141 MMOL/L (ref 133–144)
WBC # BLD AUTO: 4.7 10E9/L (ref 4–11)

## 2019-11-19 PROCEDURE — 36415 COLL VENOUS BLD VENIPUNCTURE: CPT | Performed by: NURSE PRACTITIONER

## 2019-11-19 PROCEDURE — 25000132 ZZH RX MED GY IP 250 OP 250 PS 637: Mod: GY | Performed by: NURSE PRACTITIONER

## 2019-11-19 PROCEDURE — 84100 ASSAY OF PHOSPHORUS: CPT | Performed by: NURSE PRACTITIONER

## 2019-11-19 PROCEDURE — 12000026 ZZH R&B TRANSPLANT

## 2019-11-19 PROCEDURE — 25000132 ZZH RX MED GY IP 250 OP 250 PS 637: Mod: GY | Performed by: PHYSICIAN ASSISTANT

## 2019-11-19 PROCEDURE — 25000132 ZZH RX MED GY IP 250 OP 250 PS 637: Mod: GY | Performed by: SURGERY

## 2019-11-19 PROCEDURE — 83735 ASSAY OF MAGNESIUM: CPT | Performed by: NURSE PRACTITIONER

## 2019-11-19 PROCEDURE — 00000146 ZZHCL STATISTIC GLUCOSE BY METER IP

## 2019-11-19 PROCEDURE — 25000131 ZZH RX MED GY IP 250 OP 636 PS 637: Mod: GY | Performed by: PHYSICIAN ASSISTANT

## 2019-11-19 PROCEDURE — 85027 COMPLETE CBC AUTOMATED: CPT | Performed by: NURSE PRACTITIONER

## 2019-11-19 PROCEDURE — 97535 SELF CARE MNGMENT TRAINING: CPT | Mod: GO

## 2019-11-19 PROCEDURE — 80053 COMPREHEN METABOLIC PANEL: CPT | Performed by: NURSE PRACTITIONER

## 2019-11-19 PROCEDURE — 97116 GAIT TRAINING THERAPY: CPT | Mod: GP

## 2019-11-19 PROCEDURE — 25800030 ZZH RX IP 258 OP 636: Performed by: PHYSICIAN ASSISTANT

## 2019-11-19 PROCEDURE — 25000125 ZZHC RX 250: Performed by: PHYSICIAN ASSISTANT

## 2019-11-19 PROCEDURE — 97750 PHYSICAL PERFORMANCE TEST: CPT | Mod: GP

## 2019-11-19 RX ADMIN — PREDNISONE 5 MG: 5 TABLET ORAL at 08:19

## 2019-11-19 RX ADMIN — DIBASIC SODIUM PHOSPHATE, MONOBASIC POTASSIUM PHOSPHATE AND MONOBASIC SODIUM PHOSPHATE 500 MG: 852; 155; 130 TABLET ORAL at 20:15

## 2019-11-19 RX ADMIN — TAMSULOSIN HYDROCHLORIDE 0.4 MG: 0.4 CAPSULE ORAL at 21:53

## 2019-11-19 RX ADMIN — HUMAN INSULIN 4 UNITS/HR: 100 INJECTION, SOLUTION SUBCUTANEOUS at 04:56

## 2019-11-19 RX ADMIN — INSULIN ASPART 21 UNITS: 100 INJECTION, SOLUTION INTRAVENOUS; SUBCUTANEOUS at 15:56

## 2019-11-19 RX ADMIN — NYSTATIN 500000 UNITS: 100000 SUSPENSION ORAL at 20:15

## 2019-11-19 RX ADMIN — NYSTATIN 500000 UNITS: 100000 SUSPENSION ORAL at 08:19

## 2019-11-19 RX ADMIN — DIBASIC SODIUM PHOSPHATE, MONOBASIC POTASSIUM PHOSPHATE AND MONOBASIC SODIUM PHOSPHATE 500 MG: 852; 155; 130 TABLET ORAL at 14:00

## 2019-11-19 RX ADMIN — ASPIRIN 325 MG: 325 TABLET, DELAYED RELEASE ORAL at 08:21

## 2019-11-19 RX ADMIN — CARVEDILOL 25 MG: 25 TABLET, FILM COATED ORAL at 08:20

## 2019-11-19 RX ADMIN — DIBASIC SODIUM PHOSPHATE, MONOBASIC POTASSIUM PHOSPHATE AND MONOBASIC SODIUM PHOSPHATE 500 MG: 852; 155; 130 TABLET ORAL at 08:19

## 2019-11-19 RX ADMIN — AMLODIPINE BESYLATE 10 MG: 10 TABLET ORAL at 08:19

## 2019-11-19 RX ADMIN — OMEPRAZOLE 40 MG: 20 CAPSULE, DELAYED RELEASE ORAL at 08:20

## 2019-11-19 RX ADMIN — NYSTATIN 500000 UNITS: 100000 SUSPENSION ORAL at 13:59

## 2019-11-19 RX ADMIN — NYSTATIN 500000 UNITS: 100000 SUSPENSION ORAL at 15:46

## 2019-11-19 RX ADMIN — MYCOPHENOLATE MOFETIL 750 MG: 250 CAPSULE ORAL at 08:21

## 2019-11-19 RX ADMIN — Medication 600 MG: at 20:20

## 2019-11-19 RX ADMIN — INSULIN ASPART 14 UNITS: 100 INJECTION, SOLUTION INTRAVENOUS; SUBCUTANEOUS at 19:35

## 2019-11-19 RX ADMIN — MYCOPHENOLATE MOFETIL 750 MG: 250 CAPSULE ORAL at 18:00

## 2019-11-19 RX ADMIN — TACROLIMUS 5.5 MG: 5 CAPSULE ORAL at 18:00

## 2019-11-19 RX ADMIN — CARVEDILOL 25 MG: 25 TABLET, FILM COATED ORAL at 18:00

## 2019-11-19 RX ADMIN — TENOFOVIR DISOPROXIL FUMARATE 300 MG: 300 TABLET ORAL at 08:19

## 2019-11-19 RX ADMIN — TACROLIMUS 5.5 MG: 5 CAPSULE ORAL at 08:21

## 2019-11-19 RX ADMIN — MULTIPLE VITAMINS W/ MINERALS TAB 1 TABLET: TAB at 08:21

## 2019-11-19 RX ADMIN — INSULIN ASPART 6 UNITS: 100 INJECTION, SOLUTION INTRAVENOUS; SUBCUTANEOUS at 09:34

## 2019-11-19 RX ADMIN — TRAZODONE HYDROCHLORIDE 50 MG: 50 TABLET ORAL at 21:53

## 2019-11-19 RX ADMIN — SULFAMETHOXAZOLE AND TRIMETHOPRIM 1 TABLET: 400; 80 TABLET ORAL at 08:21

## 2019-11-19 RX ADMIN — SODIUM CHLORIDE 1000 ML: 9 INJECTION, SOLUTION INTRAVENOUS at 09:22

## 2019-11-19 RX ADMIN — VALGANCICLOVIR 450 MG: 450 TABLET, FILM COATED ORAL at 08:21

## 2019-11-19 RX ADMIN — Medication: at 09:39

## 2019-11-19 ASSESSMENT — PAIN DESCRIPTION - DESCRIPTORS: DESCRIPTORS: ACHING;OTHER (COMMENT)

## 2019-11-19 ASSESSMENT — ACTIVITIES OF DAILY LIVING (ADL)
ADLS_ACUITY_SCORE: 14

## 2019-11-19 NOTE — PROGRESS NOTES
"IP Diabetes Management  Daily Note           Assessment and Plan:   HPI: Mr. Miller Workman is a 53 yo man with a history of type 2 diabetes complicated by peripheral neuropathy and retinopathy, cirrhosis secondary to ESTRADA, HCC, HTN, HLD, GERD, BPH, who is s/p DBD liver transplant on 11/11/19.  He returned to OR on POD1 for ex lap, hematoma evacuation and washout.    Assessment:   1)Type II Diabetes Mellitus with neuropathy and retinopathy   2) Enteral feed induced hyperglycemia, now resolved with discontinuation of tube feeds.     Plan:    -Lantus 55 units this morning, transition off IV insulin 2 hours later.    -discontinue evening NPH (cycled tube feeds discontinued)    -aspart 1:5g CHO coverage with meals and snacks/supplements   -aspart custom 1:20 intensity sliding scale >140 TID AC and >200 HS   -BG monitoring TID AC, HS, 0200   -hypoglycemia protocol   -regular diet with carb counting protocol   -holding PTA metformin and farxiga (resume when appropriate in the outpatient setting)     Outpatient follow up: with [facility], 1-2 weeks post discharge. Will send appt request to schedule (he has one with Dr. Wilcox 12/18, but should be seen in ~2 weeks post hospital discharge).   Plan discussed with patient, bedside RN.     Interval History and Assessment: interval glucose trend reviewed:   Averaging 2.8 units per hour overnight with NPH 45 units on board for cycled tube feeds, ~ 68 units basal insulin daily. With 20% reduction off IV insulin, ~55 units.     Tube feeds cycled one night, and he has been eating well. Tube was dislodged yesterday, and pulled. No plans to resume tube feeds.     Miller endorses good appetite since TF stopped. Given TF stopped about two hours early with NPH on board, due to tube dislodgement, BG to 79 this morning.     Miller endorses that he needs a new \"meter\"; per chart review referring to camilo flash. He utilizes this to calculate his aspart carb coverage and sliding scale " dosing. Will clarify exactly what supplies he needs prior to discharge (reader, sensors, or help with the yuval).     Current nutritional intake and type: Orders Placed This Encounter      Regular Diet Adult      PTA Diabetes Regimen:   Tresiba 60-70 units qAM  aspart 1unit/4g CHO  aspart 1unit/25 for BG >125  Metformin ER 500mg with supper  Farxiga 10mg daily  Benjamin Flash for glucose monitoring and calculating aspart doses- aiming for before meals and bedtime.    Discharge Planning: home in 1-2 days.           Diabetes History:   Type of Diabetes: Type 2 Diabetes Mellitus  Lab Results   Component Value Date    A1C 6.6 08/08/2018    A1C 6.5 06/09/2017    A1C 7.8 10/25/2016              Review of Systems:   The Review of Systems is negative other than noted in the Interval History.           Medications:     Current Facility-Administered Medications   Medication     alpha-lipoic acid capsule 600 mg     amLODIPine (NORVASC) tablet 10 mg     aspirin (ASA) EC tablet 325 mg     bisacodyl (DULCOLAX) Suppository 10 mg     carvedilol (COREG) tablet 25 mg     dextrose 10 % 1,000 mL infusion     glucose gel 15-30 g    Or     dextrose 50 % injection 25-50 mL    Or     glucagon injection 1 mg     insulin aspart (NovoLOG) inj (RAPID ACTING)     insulin aspart (NovoLOG) inj (RAPID ACTING)     insulin aspart (NovoLOG) inj (RAPID ACTING)     insulin aspart (NovoLOG) inj (RAPID ACTING)     insulin glargine (LANTUS PEN) injection 55 Units     insulin isophane human (HumuLIN N PEN) injection 45 Units     multivitamin w/minerals (THERA-VIT-M) tablet 1 tablet     mycophenolate (GENERIC EQUIVALENT) capsule 750 mg     naloxone (NARCAN) injection 0.1-0.4 mg     nystatin (MYCOSTATIN) suspension 500,000 Units     omeprazole (priLOSEC) CR capsule 40 mg     ondansetron (ZOFRAN-ODT) ODT tab 4-8 mg     oxyCODONE (ROXICODONE) tablet 5 mg     phosphorus tablet 250 mg (PHOSPHA 250 NEUTRAL) per tablet 500 mg     polyethylene glycol (MIRALAX/GLYCOLAX)  Packet 17 g     predniSONE (DELTASONE) tablet 5 mg     prochlorperazine (COMPAZINE) injection 10 mg    Or     prochlorperazine (COMPAZINE) Suppository 25 mg     senna-docusate (SENOKOT-S/PERICOLACE) 8.6-50 MG per tablet 1 tablet    Or     senna-docusate (SENOKOT-S/PERICOLACE) 8.6-50 MG per tablet 2 tablet     simethicone (MYLICON) chewable tablet 80 mg     sodium chloride (PF) 0.9% PF flush 3 mL     sodium chloride (PF) 0.9% PF flush 3 mL     sulfamethoxazole-trimethoprim (BACTRIM/SEPTRA) 400-80 MG per tablet 1 tablet     tacrolimus (GENERIC EQUIVALENT) capsule 5.5 mg     tamsulosin (FLOMAX) capsule 0.4 mg     tenofovir (VIREAD) tablet 300 mg     traZODone (DESYREL) tablet 50 mg     valGANciclovir (VALCYTE) tablet 450 mg            Physical Exam:    BP 96/64 (BP Location: Left arm)   Pulse 86   Temp 98  F (36.7  C) (Oral)   Resp 18   Wt 80.2 kg (176 lb 12.8 oz)   SpO2 93%   BMI 26.11 kg/m    General: pleasant, in no distress.   HEENT: normocephalic, atraumatic. Oral mucous membranes moist.   Lungs: unlabored respiration, no cough  ABD: rounded, soft, no lipodystrophy noted  Skin: warm and dry, no obvious lesions  MSK:  moves all extremities  Lymp:  no LE edema   Mental status:  alert, oriented to self, place, time  Psych:  affect, calm and appropriate interaction             Data:     Recent Labs   Lab 11/19/19  1051 11/19/19  0958 11/19/19  0915 11/19/19  0807 11/19/19  0657 11/19/19  0611 11/19/19  0602  11/18/19  0437  11/17/19  0706  11/16/19  0648  11/15/19  0449  11/15/19  0158   GLC  --   --   --   --   --  141*  --   --  89  --  136*  --  124*  --  134*  --  134*   * 155* 123* 79 120*  --  126*   < >  --    < >  --    < >  --    < >  --    < >  --     < > = values in this interval not displayed.     Lab Results   Component Value Date    WBC 4.7 11/19/2019    WBC 5.7 11/18/2019    WBC 5.0 11/17/2019    HGB 9.0 (L) 11/19/2019    HGB 9.9 (L) 11/18/2019    HGB 9.8 (L) 11/17/2019    HCT 27.4 (L)  11/19/2019    HCT 30.7 (L) 11/18/2019    HCT 30.1 (L) 11/17/2019    MCV 94 11/19/2019    MCV 95 11/18/2019    MCV 94 11/17/2019    PLT 59 (L) 11/19/2019    PLT 50 (L) 11/18/2019    PLT 45 (LL) 11/17/2019     Lab Results   Component Value Date     11/19/2019     11/18/2019     (H) 11/17/2019    POTASSIUM 4.1 11/19/2019    POTASSIUM 4.1 11/18/2019    POTASSIUM 4.0 11/17/2019    CHLORIDE 113 (H) 11/19/2019    CHLORIDE 112 (H) 11/18/2019    CHLORIDE 116 (H) 11/17/2019    CO2 21 11/19/2019    CO2 23 11/18/2019    CO2 25 11/17/2019     (H) 11/19/2019    GLC 89 11/18/2019     (H) 11/17/2019     Lab Results   Component Value Date    BUN 32 (H) 11/19/2019    BUN 33 (H) 11/18/2019    BUN 30 11/17/2019     Lab Results   Component Value Date    TSH 0.49 08/08/2018    TSH 0.71 02/08/2018    TSH 0.42 06/09/2017     Lab Results   Component Value Date    AST 40 11/19/2019    AST 59 (H) 11/18/2019    AST 61 (H) 11/17/2019    ALT 87 (H) 11/19/2019     (H) 11/18/2019     (H) 11/17/2019    ALKPHOS 203 (H) 11/19/2019    ALKPHOS 258 (H) 11/18/2019    ALKPHOS 257 (H) 11/17/2019       Diabetes Management Team job code: [id number]  I spent a total of 25 minutes bedside and on the inpatient unit managing glycemic care. Over 50% of my time on the unit was spent counseling the patient and/or coordinating care regarding acute hyperglycemia management.  See note for details.    Lanie Atkinson PA-C  Inpatient Diabetes Management Service  Pager [phone]

## 2019-11-19 NOTE — TELEPHONE ENCOUNTER
Please schedule Miller for endocrine/diabetes follow up in 2 weeks if possible. Has appt with Dr Wilcox 12/18, but has been hospitalized following liver transplant, with medication and insulin adjustments. Needs to be seen sooner than currently scheduled appt.     Thank you   Lanie Atkinson.

## 2019-11-19 NOTE — PLAN OF CARE
BP 96/64 (BP Location: Left arm)   Pulse 86   Temp 98  F (36.7  C) (Oral)   Resp 18   Wt 80.2 kg (176 lb 12.8 oz)   SpO2 93%   BMI 26.11 kg/m      Patient BP's soft, AOVSS on RA, afebrile. BG  on insulin gtt, 55 units of lantus given @ 0945, insulin gtt stopped ~1145,  prior to lunch, carb coverage still to be given. Denies pain. Tolerating regular diet with good appetite, 30 carbs at breakfast and 23 carbs with boost snack, carb coverage given. NJ removed. PIV-SL, 1L NS bolus given. BM today, pt having loose stools, pt declined miralax and senna. Pt voiding but not saving. Incision stapled, CDI, R LUANNE removed, L LUANNE with 100 ml output serosang. Up ad karely. OT up with patient for shower. Educated for medication regimen with morning medications, med card and lab book still need updating. Plan to continue to educate and monitor blood glucose. Will continue with POC and notify MD with changes or concerns.

## 2019-11-19 NOTE — PROGRESS NOTES
11/19/19 1521   Signing Clinician's Name / Credentials   Signing clinician's name / credentials Alisha Huitron DPT   Dynamic Gait Index (Chris and Rojas Nasrin, 1995)   Gait Level Surface 3   Change in Gait Speed 3   Gait and Horizontal Head Turns 1   Gait with Vertical Head Turns 2   Gait and Pivot Turns 1   Step Over Obstacle 2   Step Around Obstacles 3   Steps 2   Total Dynamic Gait Index Score  (A score of 19 or less has been correlated to an increased risk of falls in community dwelling older adults, patients with vestibular disorders, and patients with MS.)   Total Score (out of 24) 17     Dynamic Gait Index (DGI):The DGI is a measure of balance during gait that is reliable and valid for the elderly and individuals with Parkinson's disease, MS, vestibular disorders, or s/p stroke. Gait assistive device used: none     Patient score: 17/24  Scores ?19/24 indicate an increased risk for falls according to Renate et al 2000  Minimal Detectable Change = 2.9 in community dwelling elderly according to Davin et al 2011    Assessment (rationale for performing, application to patient s function & care plan): AD needs at discharge  Minutes billed as physical performance test: 10

## 2019-11-19 NOTE — PROVIDER NOTIFICATION
Pt's NJ bridle noted to be lower and loop of NJ was out in front of pt's face. Candelaria CANCHOLA was notified. She pushed it back in.

## 2019-11-19 NOTE — PLAN OF CARE
Post Liver Transplant Coordinator Discharge Visit       Patient aware of the need for follow up visit in Specialty Infusion and Procedure Room:Pt and friend, Art are aware to bring medication box, bottles, book and medication card with to first appt.     Patient informed of the need for primary care and surgeon appointments within 2 weeks if being discharged to home:yes    Class attendance and educational needs have been addressed:Pt looking at handbook and will attempt to look at my transplant place.     Patient has transplant handbooks,  and other supplies as needed: using handbook and med card.     Patient knows the names of immunosuppressive medications:learning    Transition plan:home with assistance    Pharmacy to be used after discharge:[facility]    Home Health Care:will need    Organ specific education completed, and discharge planning has been conducted with multidisciplinary transplant care team including physicians, pharmacy, nutrition, and social work.     Pt does not cook nor know how to. He has lived on  Frozen meals. Pt aware no grapefruit or grapefruit juice. Pt aware fresca and squirt pop have grapefruit juice in them. Encouraged to use healthy food choices  and keep a food log.

## 2019-11-19 NOTE — PROGRESS NOTES
Care Coordinator Progress Note    Admission Date/Time:  11/10/2019  Attending MD:  Александр Ramos MD    Data  Chart reviewed, discussed with interdisciplinary team.   Patient was admitted for: Liver transplant recipient (H).    Concerns with insurance coverage for discharge needs: None.  Current Living Situation: Patient lives alone.  Support System: Supportive and Involved, friend Davi who lives in [place] but flies back frequently  Services Involved: none  Transportation at Discharge: Taxi or friend  Transportation to Medical Appointments: taxi or friend  Barriers to Discharge: medical stability    Coordination of Care   Met with patient to introduce RNCC role and to discuss discharge planning.   Patient will need to visit ATC the Monday after discharge, 11/25/19 at 9:00AM for lab draws and physician visit. Inpatient rehab has recommended HH PT/OT. In addition patient would like to have RN/HHA as well. Pt given Medicare Compare list with associated star ratings to assist with choice for home care agency and prefers [facility] (orders complete, referral sent).     Pt's Outpatient Care Coordinator is: Radha Ndiaye . Pt is to call OP CC with questions or concerns and notify them if they develop vomiting or diarrhea right away, as pt may need to be readmitted to determine cause and get IV immunosuppression/ or hydration.No lifting over 10 lbs x 6 weeks. Stay hydrated with 1-2 liters of water QD. Patient confirmed he does not have any pets.    Patient's PCP at  left the practice and his next new patient apt with Dr. Moe isn't until April. She doesn't have any openings next week. Pt is okay with seeing a different provider. Appointment set up for Tuesday 11/26 at 12:35PM with Dr. [facility]  Phone: [phone]  Fax: [phone]      Plan  Anticipated Discharge Date:  11/19/19  Anticipated Discharge Plan:  Home with [facility], ATC apt, new PCP follow up    Celeste Mcmullen RN, [place]  [facility]  Care Coordinator  Pager: [phone]    Weekend RNCC  dial * * [phone] from [facility] phone only  enter code [phone] at prompt.

## 2019-11-19 NOTE — PROGRESS NOTES
Transplant Surgery  Inpatient Daily Progress Note  11/19/2019    Assessment & Plan: Frandy Workman is a 54 year old male with a past medical history of cirrhosis secondary to ESTRADA diagnosed in 2013, HCC 2018, HTN, HLD, GERD, BPH, and DM2 who underwent a DBD liver transplant on 11/11/19 with Dr. Ramos.  He returned to OR POD1 for ex lap, hematoma evacuation, and washout.     Graft function: POD #8. LFTs trending down. Tbili 0.8(from 1.2). Liver U/S on 11/14 demonstrated patent vessels with decreased resistive index of R HA. JPs with serosanguinous output; LUANNE #1 removed today.  Immunosuppression management:   Induction: Solumedrol taper per protocol. Restart prednisone 5mg daily until tac level therapeutic (last level 7.5).  MMF: 750mg BID  FK: 5.5mg BID. Tac levels MWF. Goal 10-12.  Complexity of management: Medium. Contributing factors: thrombocytopenia, anemia and induction  Hematology:   Acute blood loss anemia: Hgb 9 (from 9.9), last transfusion 11/14. Stable.  Thrombocytopenia: Plt 59. HIT panel negative.    Hepatic artery prophylaxis: Was initially on heparin drip; discontinued d/t bleeding. ASA ordered at 81mg; increased to 325mg POD #7.   Cardiorespiratory:   Hypertension: SBPs 's. PTA Coreg 25mg BID. Amlodipine 10mg daily. Monitor.  GI/Nutrition:   Diarrhea: Likely TF-induced vs. Antibiotic-induced. Bowel regimen senokot-s and miralax BID on hold. Fiber added yesterday. C-diff negative.  Increase in Cr 1.46 (from 1.37) with diarrhea; 1L bolus administered.   Non-severe malnutrition in the context of chronic illness: NJ removed and TF discontinued.  Multivitamin daily. Regular diet with supplements and flynn counts (1100kcals and 80 grams protein yesterday). Encourage PO intake.  Endocrine:   DM2 with steroid-induced hyperglycemia: PTA was on Tresiba 60 units and Metformin. On insulin gtt with high rates post-op. Endocrine consulted. Discontinue insulin gtt today and transition to Lantus and  Novolog (sliding scale + carb coverage).  Fluid/Electrolytes:  Hypernatremia: Resolved.  Hypophosphatemia: Phos 3.2; decrease Phospha to TID.  : Bladder distended on U/S; PVR 12mL. No acute issues.  Infectious disease: Afebrile.  Donor UC +E. coli; zosyn x7 days completed. Donor Hep B core positive; tenofovir initiated POD2. Recipient Hep B negative, positive surface antibody. Hep B Surface Agn and viral DNA negative.  Neuro:   Delirium: Poor sleep. Continue melatonin and trazadone; zyprexa discontinued yesterday. Sleep hygiene encouraged. Now resolved.  Prophylaxis: DVT (SCDs), GI (prilosec), fall, fungal (Nystatin), viral (Valcyte), pneumocystis (Bactrim)  Disposition: PT/OT ordered; 7A with discharge to home in 1-2 days    Medical Decision Making: Medium    SERA/Fellow/Resident Provider: Maryanne Recio PA-C    Faculty: Berlin Hernandez M.D.    __________________________________________________________________  Transplant History:    11/11/2019 (Liver), Postoperative day: 8     Interval History: History obtained from patient.  Overnight NJ become dislodged and coiled in mouth; now removed. Patient reports eating well and will continue with supplements to maintain calorie intake. Denies nausea or vomiting. Diarrhea continues. Poor sleep overnight related to insulin gtt. Motivated to return home. Friend Art at bedside; supportive of patient and plans to be with him 24 hours/day through Monday ATC clinic appointment upon discharge.    ROS:   A 10-point review of systems was negative except as noted above.    Curent Meds:    alpha-lipoic acid  600 mg Oral Daily     amLODIPine  10 mg Oral Daily     aspirin  325 mg Oral Daily     carvedilol  25 mg Oral BID w/meals     insulin aspart  1-10 Units Subcutaneous TID w/meals     insulin aspart  1-11 Units Subcutaneous At Bedtime     insulin aspart   Subcutaneous TID w/meals     insulin glargine  55 Units Subcutaneous QAM     multivitamin w/minerals  1 tablet Oral Daily      mycophenolate  750 mg Oral BID IS     nystatin  500,000 Units Mouth/Throat 4x Daily     omeprazole  40 mg Oral QAM AC     phosphorus tablet 250 mg  500 mg Oral TID     polyethylene glycol  17 g Oral BID     predniSONE  5 mg Oral Daily     senna-docusate  1 tablet Oral BID    Or     senna-docusate  2 tablet Oral BID     sodium chloride (PF)  3 mL Intravenous Q8H     sulfamethoxazole-trimethoprim  1 tablet Oral Daily     tacrolimus  5.5 mg Oral BID IS     tamsulosin  0.4 mg Oral At Bedtime     tenofovir  300 mg Oral Daily     traZODone  50 mg Oral At Bedtime     valGANciclovir  450 mg Oral Daily       Physical Exam:     Admit Weight: 79.4 kg (175 lb 1.6 oz)    Current Vitals:   BP 96/64 (BP Location: Left arm)   Pulse 86   Temp 98  F (36.7  C) (Oral)   Resp 18   Wt 80.2 kg (176 lb 12.8 oz)   SpO2 93%   BMI 26.11 kg/m      Vital sign ranges:    Temp:  [97.5  F (36.4  C)-98.5  F (36.9  C)] 98  F (36.7  C)  Heart Rate:  [67-92] 67  Resp:  [16-18] 18  BP: ()/(48-73) 96/64  SpO2:  [93 %-99 %] 93 %    General Appearance: in no apparent distress.   Skin: normal, warm, dry  Heart: well-perfused  Lungs: non-labored on RA  Abdomen: incision stapled, well-approximated, without erythema.  Left LUANNE with serosanguinous output. Right LUANNE removed.  : Carroll removed  Extremities: edema: trace  Neurologic: A&Ox4    Frailty Scores     There is no flowsheet data to display.          Data:   CMP  Recent Labs   Lab 11/19/19  0611 11/18/19  0437  11/12/19  1513 11/12/19  1400    141   < > 144 145*   POTASSIUM 4.1 4.1   < > 4.2 4.2   CHLORIDE 113* 112*   < >  --   --    CO2 21 23   < >  --   --    * 89   < > 156* 147*   BUN 32* 33*   < >  --   --    CR 1.46* 1.37*   < >  --   --    GFRESTIMATED 53* 57*   < >  --   --    GFRESTBLACK 62 66   < >  --   --    DEBORAH 7.6* 7.5*   < >  --   --    ICAW  --   --   --  4.8 4.9   MAG 2.2 2.0   < >  --   --    PHOS 3.2 1.7*   < >  --   --    ALBUMIN 2.1* 2.1*   < >  --   --     BILITOTAL 0.8 1.2   < >  --   --    ALKPHOS 203* 258*   < >  --   --    AST 40 59*   < >  --   --    ALT 87* 120*   < >  --   --     < > = values in this interval not displayed.     CBC  Recent Labs   Lab 11/19/19  0611 11/18/19  0437   HGB 9.0* 9.9*   WBC 4.7 5.7   PLT 59* 50*     Attestation:    The patient has been seen and evaluated by me.   Vital signs, labs, medications and orders were reviewed.   When obtained, diagnostic images were reviewed by me and interpreted as above.    The care plan was discussed with the multidisciplinary team and I agree with the findings and plan in this note, with any differences recorded in blue.    Immunosuppressive medication management was reviewed and adjusted as reflected in the note and orders.     .

## 2019-11-19 NOTE — PLAN OF CARE
Discharge Planner PT   Patient plan for discharge: home with assist until 11/25 and HH services  Current status: Pt up IND in room, ambulates x300' no ad with SBA and mild unsteadiness but able to self-correct throughout. Administered DGI balance assessment (see previous note) with pt scoring at falls risk level. Pt agreeable to use SEC at home and ambulates x300' with SEC and greatly improved stability. Will distribute SEC to pt prior to discharge.  Barriers to return to prior living situation: medical needs, impaired balance, reduced activity tolerance  Recommendations for discharge: home with assist and  PT/OT + SEC  Rationale for recommendations: Pt currently below baseline level of function and would benefit from ongoing therapy to address the above deficits in order to progress towards PLOF and promote IND mobility.       Entered by: Alisha Huitron 11/19/2019 4:42 PM

## 2019-11-19 NOTE — PROGRESS NOTES
Calorie Count  Intake recorded for: 11/18  Total Kcals: 1106 Total Protein: 80g  Kcals from Hospital Food: 1106  Protein: 80g  Kcals from Outside Food (average):0 Protein: 0g  # Meals Recorded: 3 meals (First - 100% diet lemonade, 50% roast turkey w/ gravy, green beans, 25% mashed potatoes w/ gravy)      (Second - 100% pancake w/ butter & syrup, 2 milks, raisin bran, strawberry yogurt, coffee)      (Third - 100% iced tea w/ lemon, baked cod, 50% side mac & cheese, 25% carrots)  # Supplements Recorded: 100% 1 Boost Glucose Control

## 2019-11-19 NOTE — PLAN OF CARE
/48   Pulse 86   Temp 98.5  F (36.9  C) (Oral)   Resp 16   Wt 80.2 kg (176 lb 12.8 oz)   SpO2 98%   BMI 26.11 kg/m   AVSS on RA. Patient has mild pain. Declines pain meds. Patient denies nausea, BG ranging 107-134. On Insulin drip. Checking BS hourly. Urine Output - void x 2. Bowel Function - diarrhea x 2.  Nutrition - cycled TF at 60 ml/hr. Pt states he has not had much of an appetite.  Drains - LUANNE x 2. R LUANNE-50 ml out. L LUANNE- 100 ml out. Activity - SBA.

## 2019-11-19 NOTE — PROVIDER NOTIFICATION
Pt noted to be coughing and gagging for about 10 minutes. NJ noted to be coiled in the back of his throat. Candelaria NP was notified. TF was stopped. Report to day shift nurse.

## 2019-11-19 NOTE — PLAN OF CARE
OT/7A - Discharge Planner OT   Patient plan for discharge: home with assist from Art until 11/25 (Monday) and then home care  Current status: Facilitated seated/standing shower with verbal cues for safety and compliance with post surgical precautions. Recommended use of shower chair and stand by assist during shower upon return home for increased safety. Pt requires cueing for safety during LB dressing while seated on toilet due to abdominal precautions. Increased confusion noted on this date, pt reports due to limited sleep last night.  Barriers to return to prior living situation: cognition, post surgical precautions  Recommendations for discharge: home with increased assist; would recommend supervision with bathing and medication management  Rationale for recommendations: Pt reports comfort with plan for discharge to home. Pt has demonstrated significant progress during inpatient therapy, but would benefit from home therapy to increase safety and IND with ADL/IADL upon return home.       Entered by: Josefa Acuna 11/19/2019 3:34 PM

## 2019-11-19 NOTE — PLAN OF CARE
/53   Pulse 86   Temp 98  F (36.7  C) (Oral)   Resp 16   Wt 80.2 kg (176 lb 12.8 oz)   SpO2 97%   BMI 26.11 kg/m         1500 - 2330  All VS stable. Pt on room air. Patient has intermittent pain. Patient denies nausea. BG - Pt on insulin drip using algorithm 4. Urine Output - Pt voiding adequately. Bowel Function - Pt passing loose, watery stool x 2. Nutrition - regular diet. Drains - Bilateral LUANNE drains with minimal serosanguinous output. Activity - Up with assistance. Education - Pt educated about med card. Plan of Care - will continue with plan of care and notify care team of any changes.

## 2019-11-20 ENCOUNTER — APPOINTMENT (OUTPATIENT)
Dept: PHYSICAL THERAPY | Facility: CLINIC | Age: 54
DRG: 005 | End: 2019-11-20
Attending: SURGERY
Payer: MEDICARE

## 2019-11-20 ENCOUNTER — APPOINTMENT (OUTPATIENT)
Dept: OCCUPATIONAL THERAPY | Facility: CLINIC | Age: 54
DRG: 005 | End: 2019-11-20
Attending: SURGERY
Payer: MEDICARE

## 2019-11-20 ENCOUNTER — TELEPHONE (OUTPATIENT)
Dept: TRANSPLANT | Facility: CLINIC | Age: 54
End: 2019-11-20

## 2019-11-20 VITALS
HEART RATE: 91 BPM | TEMPERATURE: 98.1 F | DIASTOLIC BLOOD PRESSURE: 72 MMHG | WEIGHT: 176.8 LBS | BODY MASS INDEX: 26.11 KG/M2 | OXYGEN SATURATION: 100 % | RESPIRATION RATE: 16 BRPM | SYSTOLIC BLOOD PRESSURE: 102 MMHG

## 2019-11-20 PROBLEM — D62 ANEMIA DUE TO BLOOD LOSS, ACUTE: Status: ACTIVE | Noted: 2019-11-20

## 2019-11-20 PROBLEM — R41.0 DELIRIUM: Status: ACTIVE | Noted: 2019-11-20

## 2019-11-20 PROBLEM — E83.39 HYPOPHOSPHATASIA: Status: ACTIVE | Noted: 2019-11-20

## 2019-11-20 PROBLEM — D69.6 THROMBOCYTOPENIA (H): Status: ACTIVE | Noted: 2019-11-20

## 2019-11-20 PROBLEM — E46 MALNUTRITION RELATED TO CHRONIC DISEASE (H): Status: ACTIVE | Noted: 2019-11-20

## 2019-11-20 PROBLEM — D84.9 IMMUNOSUPPRESSED STATUS (H): Status: ACTIVE | Noted: 2019-11-20

## 2019-11-20 PROBLEM — R19.7 DIARRHEA: Status: ACTIVE | Noted: 2019-11-20

## 2019-11-20 PROBLEM — E87.0 HYPERNATREMIA: Status: ACTIVE | Noted: 2019-11-20

## 2019-11-20 LAB
ALBUMIN SERPL-MCNC: 2.2 G/DL (ref 3.4–5)
ALP SERPL-CCNC: 184 U/L (ref 40–150)
ALT SERPL W P-5'-P-CCNC: 66 U/L (ref 0–70)
ANION GAP SERPL CALCULATED.3IONS-SCNC: 6 MMOL/L (ref 3–14)
AST SERPL W P-5'-P-CCNC: 22 U/L (ref 0–45)
BILIRUB SERPL-MCNC: 0.6 MG/DL (ref 0.2–1.3)
BUN SERPL-MCNC: 30 MG/DL (ref 7–30)
CALCIUM SERPL-MCNC: 7.9 MG/DL (ref 8.5–10.1)
CHLORIDE SERPL-SCNC: 115 MMOL/L (ref 94–109)
CO2 SERPL-SCNC: 21 MMOL/L (ref 20–32)
CREAT SERPL-MCNC: 1.47 MG/DL (ref 0.66–1.25)
ERYTHROCYTE [DISTWIDTH] IN BLOOD BY AUTOMATED COUNT: 17.5 % (ref 10–15)
GFR SERPL CREATININE-BSD FRML MDRD: 53 ML/MIN/{1.73_M2}
GLUCOSE BLDC GLUCOMTR-MCNC: 142 MG/DL (ref 70–99)
GLUCOSE BLDC GLUCOMTR-MCNC: 150 MG/DL (ref 70–99)
GLUCOSE BLDC GLUCOMTR-MCNC: 178 MG/DL (ref 70–99)
GLUCOSE SERPL-MCNC: 116 MG/DL (ref 70–99)
HCT VFR BLD AUTO: 28.4 % (ref 40–53)
HGB BLD-MCNC: 9.2 G/DL (ref 13.3–17.7)
MAGNESIUM SERPL-MCNC: 2.3 MG/DL (ref 1.6–2.3)
MCH RBC QN AUTO: 30.9 PG (ref 26.5–33)
MCHC RBC AUTO-ENTMCNC: 32.4 G/DL (ref 31.5–36.5)
MCV RBC AUTO: 95 FL (ref 78–100)
PHOSPHATE SERPL-MCNC: 3.7 MG/DL (ref 2.5–4.5)
PLATELET # BLD AUTO: 93 10E9/L (ref 150–450)
POTASSIUM SERPL-SCNC: 4.6 MMOL/L (ref 3.4–5.3)
PROT SERPL-MCNC: 5.7 G/DL (ref 6.8–8.8)
RBC # BLD AUTO: 2.98 10E12/L (ref 4.4–5.9)
SODIUM SERPL-SCNC: 142 MMOL/L (ref 133–144)
TACROLIMUS BLD-MCNC: 16.4 UG/L (ref 5–15)
TME LAST DOSE: ABNORMAL H
WBC # BLD AUTO: 6.5 10E9/L (ref 4–11)

## 2019-11-20 PROCEDURE — 36415 COLL VENOUS BLD VENIPUNCTURE: CPT | Performed by: NURSE PRACTITIONER

## 2019-11-20 PROCEDURE — 25000132 ZZH RX MED GY IP 250 OP 250 PS 637: Mod: GY | Performed by: PHYSICIAN ASSISTANT

## 2019-11-20 PROCEDURE — 85027 COMPLETE CBC AUTOMATED: CPT | Performed by: NURSE PRACTITIONER

## 2019-11-20 PROCEDURE — 97116 GAIT TRAINING THERAPY: CPT | Mod: GP

## 2019-11-20 PROCEDURE — 83735 ASSAY OF MAGNESIUM: CPT | Performed by: PHYSICIAN ASSISTANT

## 2019-11-20 PROCEDURE — 84100 ASSAY OF PHOSPHORUS: CPT | Performed by: PHYSICIAN ASSISTANT

## 2019-11-20 PROCEDURE — 25000131 ZZH RX MED GY IP 250 OP 636 PS 637: Mod: GY | Performed by: PHYSICIAN ASSISTANT

## 2019-11-20 PROCEDURE — 97530 THERAPEUTIC ACTIVITIES: CPT | Mod: GP

## 2019-11-20 PROCEDURE — 40000141 ZZH STATISTIC PERIPHERAL IV START W/O US GUIDANCE

## 2019-11-20 PROCEDURE — 97530 THERAPEUTIC ACTIVITIES: CPT | Mod: GO | Performed by: OCCUPATIONAL THERAPIST

## 2019-11-20 PROCEDURE — 25000132 ZZH RX MED GY IP 250 OP 250 PS 637: Mod: GY | Performed by: SURGERY

## 2019-11-20 PROCEDURE — 25800030 ZZH RX IP 258 OP 636: Performed by: PHYSICIAN ASSISTANT

## 2019-11-20 PROCEDURE — 80053 COMPREHEN METABOLIC PANEL: CPT | Performed by: NURSE PRACTITIONER

## 2019-11-20 PROCEDURE — 97535 SELF CARE MNGMENT TRAINING: CPT | Mod: GO | Performed by: OCCUPATIONAL THERAPIST

## 2019-11-20 PROCEDURE — 00000146 ZZHCL STATISTIC GLUCOSE BY METER IP

## 2019-11-20 PROCEDURE — 80197 ASSAY OF TACROLIMUS: CPT | Performed by: PHYSICIAN ASSISTANT

## 2019-11-20 RX ORDER — TENOFOVIR DISOPROXIL FUMARATE 300 MG/1
300 TABLET, FILM COATED ORAL DAILY
Qty: 30 TABLET | Refills: 11 | Status: ON HOLD | OUTPATIENT
Start: 2019-11-21 | End: 2019-12-17

## 2019-11-20 RX ORDER — TACROLIMUS 0.5 MG/1
0.5 CAPSULE ORAL 2 TIMES DAILY
Qty: 60 CAPSULE | Refills: 11 | Status: SHIPPED | OUTPATIENT
Start: 2019-11-20 | End: 2019-11-22

## 2019-11-20 RX ORDER — VALGANCICLOVIR 450 MG/1
450 TABLET, FILM COATED ORAL DAILY
Qty: 30 TABLET | Refills: 5 | Status: ON HOLD | OUTPATIENT
Start: 2019-11-21 | End: 2019-12-17

## 2019-11-20 RX ORDER — LOPERAMIDE HCL 2 MG
2 CAPSULE ORAL 4 TIMES DAILY PRN
Qty: 20 CAPSULE | Refills: 1 | Status: ON HOLD | OUTPATIENT
Start: 2019-11-20 | End: 2019-12-18

## 2019-11-20 RX ORDER — ROSUVASTATIN CALCIUM 5 MG/1
5 TABLET, COATED ORAL DAILY
Qty: 30 TABLET | Refills: 3 | Status: ON HOLD | OUTPATIENT
Start: 2019-11-20 | End: 2019-12-17

## 2019-11-20 RX ORDER — TACROLIMUS 0.5 MG/1
0.5 CAPSULE ORAL 2 TIMES DAILY
Qty: 60 CAPSULE | Refills: 11 | Status: SHIPPED | OUTPATIENT
Start: 2019-11-20 | End: 2019-11-20

## 2019-11-20 RX ORDER — CARVEDILOL 25 MG/1
25 TABLET ORAL 2 TIMES DAILY WITH MEALS
Qty: 60 TABLET | Refills: 3 | Status: SHIPPED | OUTPATIENT
Start: 2019-11-20 | End: 2019-12-02

## 2019-11-20 RX ORDER — TACROLIMUS 1 MG/1
4 CAPSULE ORAL 2 TIMES DAILY
Qty: 240 CAPSULE | Refills: 11 | Status: SHIPPED | OUTPATIENT
Start: 2019-11-20 | End: 2019-11-22

## 2019-11-20 RX ORDER — ONDANSETRON 4 MG/1
4 TABLET, ORALLY DISINTEGRATING ORAL EVERY 6 HOURS PRN
Qty: 10 TABLET | Refills: 0 | Status: ON HOLD | OUTPATIENT
Start: 2019-11-20 | End: 2019-12-10

## 2019-11-20 RX ORDER — PREDNISONE 5 MG/1
5 TABLET ORAL DAILY
Qty: 7 TABLET | Refills: 0 | Status: SHIPPED | OUTPATIENT
Start: 2019-11-21 | End: 2019-11-20

## 2019-11-20 RX ORDER — TACROLIMUS 0.5 MG/1
4 CAPSULE ORAL 2 TIMES DAILY
Qty: 480 CAPSULE | Refills: 11 | Status: SHIPPED | OUTPATIENT
Start: 2019-11-20 | End: 2019-11-20

## 2019-11-20 RX ORDER — LOPERAMIDE HCL 2 MG
2 CAPSULE ORAL 4 TIMES DAILY PRN
Status: DISCONTINUED | OUTPATIENT
Start: 2019-11-20 | End: 2019-11-20 | Stop reason: HOSPADM

## 2019-11-20 RX ORDER — AMLODIPINE BESYLATE 10 MG/1
10 TABLET ORAL DAILY
Qty: 30 TABLET | Refills: 3 | Status: SHIPPED | OUTPATIENT
Start: 2019-11-21 | End: 2019-11-25

## 2019-11-20 RX ORDER — TACROLIMUS 1 MG/1
5 CAPSULE ORAL 2 TIMES DAILY
Qty: 300 CAPSULE | Refills: 11 | Status: SHIPPED | OUTPATIENT
Start: 2019-11-20 | End: 2019-11-20

## 2019-11-20 RX ORDER — SULFAMETHOXAZOLE AND TRIMETHOPRIM 400; 80 MG/1; MG/1
1 TABLET ORAL DAILY
Qty: 30 TABLET | Refills: 11 | Status: ON HOLD | OUTPATIENT
Start: 2019-11-21 | End: 2019-12-17

## 2019-11-20 RX ORDER — MYCOPHENOLATE MOFETIL 250 MG/1
750 CAPSULE ORAL 2 TIMES DAILY
Qty: 240 CAPSULE | Refills: 11 | Status: SHIPPED | OUTPATIENT
Start: 2019-11-20 | End: 2019-12-02

## 2019-11-20 RX ORDER — OXYCODONE HYDROCHLORIDE 5 MG/1
5 TABLET ORAL EVERY 6 HOURS PRN
Qty: 20 TABLET | Refills: 0 | Status: ON HOLD | OUTPATIENT
Start: 2019-11-20 | End: 2019-12-10

## 2019-11-20 RX ORDER — ASPIRIN 325 MG
325 TABLET, DELAYED RELEASE (ENTERIC COATED) ORAL DAILY
Qty: 30 TABLET | Refills: 3 | Status: ON HOLD | OUTPATIENT
Start: 2019-11-21 | End: 2019-12-17

## 2019-11-20 RX ORDER — MYCOPHENOLATE MOFETIL 250 MG/1
750 CAPSULE ORAL 2 TIMES DAILY
Qty: 240 CAPSULE | Refills: 11 | Status: SHIPPED | OUTPATIENT
Start: 2019-11-20 | End: 2019-11-20

## 2019-11-20 RX ADMIN — SODIUM CHLORIDE 500 ML: 9 INJECTION, SOLUTION INTRAVENOUS at 12:22

## 2019-11-20 RX ADMIN — MULTIPLE VITAMINS W/ MINERALS TAB 1 TABLET: TAB at 08:55

## 2019-11-20 RX ADMIN — NYSTATIN 500000 UNITS: 100000 SUSPENSION ORAL at 08:48

## 2019-11-20 RX ADMIN — CARVEDILOL 25 MG: 25 TABLET, FILM COATED ORAL at 08:50

## 2019-11-20 RX ADMIN — PREDNISONE 5 MG: 5 TABLET ORAL at 08:55

## 2019-11-20 RX ADMIN — SODIUM CHLORIDE 500 ML: 9 INJECTION, SOLUTION INTRAVENOUS at 09:05

## 2019-11-20 RX ADMIN — INSULIN ASPART 12 UNITS: 100 INJECTION, SOLUTION INTRAVENOUS; SUBCUTANEOUS at 12:51

## 2019-11-20 RX ADMIN — TACROLIMUS 5.5 MG: 5 CAPSULE ORAL at 08:55

## 2019-11-20 RX ADMIN — NYSTATIN 500000 UNITS: 100000 SUSPENSION ORAL at 12:24

## 2019-11-20 RX ADMIN — AMLODIPINE BESYLATE 10 MG: 10 TABLET ORAL at 08:55

## 2019-11-20 RX ADMIN — VALGANCICLOVIR 450 MG: 450 TABLET, FILM COATED ORAL at 08:55

## 2019-11-20 RX ADMIN — ASPIRIN 325 MG: 325 TABLET, DELAYED RELEASE ORAL at 08:55

## 2019-11-20 RX ADMIN — MYCOPHENOLATE MOFETIL 750 MG: 250 CAPSULE ORAL at 08:54

## 2019-11-20 RX ADMIN — OMEPRAZOLE 40 MG: 20 CAPSULE, DELAYED RELEASE ORAL at 08:49

## 2019-11-20 RX ADMIN — SULFAMETHOXAZOLE AND TRIMETHOPRIM 1 TABLET: 400; 80 TABLET ORAL at 08:55

## 2019-11-20 RX ADMIN — INSULIN ASPART 4 UNITS: 100 INJECTION, SOLUTION INTRAVENOUS; SUBCUTANEOUS at 09:56

## 2019-11-20 RX ADMIN — LOPERAMIDE HYDROCHLORIDE 2 MG: 2 CAPSULE ORAL at 09:55

## 2019-11-20 RX ADMIN — DIBASIC SODIUM PHOSPHATE, MONOBASIC POTASSIUM PHOSPHATE AND MONOBASIC SODIUM PHOSPHATE 500 MG: 852; 155; 130 TABLET ORAL at 08:55

## 2019-11-20 RX ADMIN — TENOFOVIR DISOPROXIL FUMARATE 300 MG: 300 TABLET ORAL at 08:48

## 2019-11-20 ASSESSMENT — ACTIVITIES OF DAILY LIVING (ADL)
ADLS_ACUITY_SCORE: 14

## 2019-11-20 NOTE — DISCHARGE SUMMARY
[facility] Discharge Summary    Frandy Workman  [id number]    54 year old  1965      Date of Admission:  11/10/2019  Date of Discharge::  11/20/2019   Admitting Physician:  Александр Ramos MD  Discharge Physician:  Berlin Hernandez MD     Home clinic:   Natalie Chun  [address] [facility] / [facility] [address]  Fax: [phone]  Phone: [phone]          Admission Diagnoses:     Liver transplant recipient (H)          Discharge Diagnosis:     Principal Problem:    Status post liver transplantation (H)  Active Problems:    Type II diabetes mellitus (H)    Liver transplant recipient (H)    Immunosuppressed status (H)    Anemia due to blood loss, acute    Thrombocytopenia (H)    HTN (hypertension)    Diarrhea    Delirium    Malnutrition related to chronic disease (H)    Hypernatremia    Hypophosphatasia            Procedures:   Procedure(s): Liver transplant 11/11/19  1. Donation after Brain Death liver transplant  2. End-to-end Choledochocholedochostomy over an 8 pediatric feeding tube  3. Liver biopsy  4. Venovenous bypass    Return to OR 11/12/19  1. Exploratory laparotomy  2. Hematoma evacuation  3. Abdominal washout    Feeding Tube Placement 11/12/19            Imaging:     Results for orders placed or performed during the hospital encounter of 11/10/19   XR Chest 2 Views    Impression    IMPRESSION: No acute cardiopulmonary findings.    I have personally reviewed the examination and initial interpretation  and I agree with the findings.    ORALIA DURAN MD   XR Chest Port 1 View    Impression    IMPRESSION:   1. Interval intubation with support devices in appropriate position as  described above.  2. Mild pulmonary venous congestion and small left pleural effusion.  3. Left retrocardiac opacities likely subsegmental atelectasis.    I have personally reviewed the examination and initial interpretation  and I agree with the findings.    AHSAN AMEZCUA MD   XR Abdomen Port 1 View     Impression    IMPRESSION:  1. Nasogastric tube in good position.  2. Left basilar consolidation/atelectasis.  3. Nonobstructive bowel gas pattern.    AHSAN AMEZCUA MD   US Liver Transplant Portable    Impression    Impression:   1.  There are 2 perihepatic fluid collections, likely representing  hematomas versus seromas. One is inferior to the right hepatic lobe,  measuring 8.2 cm, the other is posterior to the right hepatic lobe  measuring 8.3 cm and contains a surgical drain.  2.  Elevated resistive indices in the right and left hepatic arteries.  Attention required on follow-up.  3.  Splenomegaly with spleen measuring 15.6 cm.  4.  Right-sided pleural effusion and mild ascites in the pelvis.  5.  Pancreas is not well visualized on this exam.    I have personally reviewed the examination and initial interpretation  and I agree with the findings.    DINO LAWS MD   XR Abdomen Port 1 View    Impression    Impression: Nasogastric tube is been placed. The tip is in the second  portion of duodenum.    DINO LAWS MD   XR Abdomen Port 1 View    Impression    IMPRESSION:   1. Enteric tube tip projects over the second portion of the duodenum.  2. Gastric tube tip projects over antrum or proximal duodenum;  consider retraction.    I have personally reviewed the examination and initial interpretation  and I agree with the findings.    JAVAD PITT MD   XR Chest Port 1 View    Impression    IMPRESSION:   1. Increased bilateral interstitial and airspace opacities, likely  pulmonary edema.  2. Right IJ CVC tip is at the cavoatrial junction.    I have personally reviewed the examination and initial interpretation  and I agree with the findings.    RODERICK HENDERSON MD    Liver Transplant Follow Up Portable    Impression    Impression:   1.  Unremarkable grayscale appearance of the transplant liver.  2.  Decreased size of the anechoic avascular fluid collection inferior  to the right hepatic lobe. The  posterior fluid collection containing  hepatic drain is not demonstrated on this examination.  3.  Increased elevation of the resistive index of the right hepatic  artery measuring up to 1.0 cm. Recommend attention on follow-up.   4.  Right pleural effusion.    [Result: Increased elevation of the right hepatic artery resistive  index, recommend short-term follow-up in one day]    Finding was identified on 11/13/2019 5:40 PM.     Dr Rodriguez was contacted by me on 11/13/2019 6:00 PM and verbalized  understanding of the result.    I have personally reviewed the examination and initial interpretation  and I agree with the findings.    GIORGI RODRIGUEZ MD    Liver Transplant Follow Up Portable    Impression    Impression:   1.  Patent hepatic vasculature.  2.   There are 3 perihepatic fluid collections, likely representing  postoperative seroma/hematomas/bilomas.  3.  Right-sided pleural effusion.  4.  Bladder appears distended.  5.  The pancreas and common bile duct are not visualized on this exam.    I have personally reviewed the examination and initial interpretation  and I agree with the findings.    GIORGI RODRIGUEZ MD   XR Abdomen Port 1 View    Impression    IMPRESSION: Enteric tube tip projects chest proximal to ligament of  Treitz, in appropriate position.    I have personally reviewed the examination and initial interpretation  and I agree with the findings.    TRACY GUEVARA MD   XR Chest Port 1 View    Impression    IMPRESSION:   1. Little change in the bilateral interstitial and airspace opacities.  2. Trace pleural effusions.    I have personally reviewed the examination and initial interpretation  and I agree with the findings.    GIORGI RODRIGUEZ MD           Medications Prior to Admission:     Facility-Administered Medications Prior to Admission   Medication Dose Route Frequency Provider Last Rate Last Dose     Aflibercept (EYLEA) injection 2 mg  2 mg Intravitreal Q28 Days Enriqueta Martin  MD Selina   2 mg at 06/27/19 1303     ranibizumab (LUCENTIS) injection syringe 0.5 mg  0.5 mg Intravitreal Q28 Days Enriqueta Martin MD         [DISCONTINUED] Aflibercept (EYLEA) injection 2 mg  2 mg Intravitreal Q28 Days Enriqueta Martin MD   2 mg at 06/27/19 1303     Medications Prior to Admission   Medication Sig Dispense Refill Last Dose     alpha-lipoic acid 600 MG capsule Take 1 capsule (600 mg) by mouth daily 90 capsule 3 11/9/2019 at 1700     Artificial Tear Solution (SM ARTIFICIAL TEARS) SOLN Place 1 drop into the right eye every hour as needed Apply at least 4 times daily and as needed for dry eye 1 Bottle 3 11/9/2019 at Unknown time     BD VIKTORIA U/F 32G X 4 MM insulin pen needle Use 5 per day 300 each 3 11/9/2019 at Unknown time     Cyanocobalamin 5000 MCG TBDP Take 5,000 mcg by mouth daily   11/10/2019 at 1030     econazole nitrate 1 % external cream Apply topically daily To feet and toenails. 85 g 0 11/9/2019 at Unknown time     ferrous sulfate (FEROSUL) 325 (65 Fe) MG tablet Take 1 tablet (325 mg) by mouth 3 times daily (with meals) 90 tablet 3 11/10/2019 at 1030     Multiple Vitamin (THERAVITE PO) Take 1 tablet by mouth every morning    11/10/2019 at 1030     omeprazole (PRILOSEC) 40 MG DR capsule Take 1 capsule (40 mg) by mouth daily 90 capsule 2 11/10/2019 at 1030     tamsulosin (FLOMAX) 0.4 MG capsule TAKE 1 CAPSULE(0.4 MG) BY MOUTH DAILY 90 capsule 3 11/9/2019 at 1730     traZODone (DESYREL) 50 MG tablet Take 1 tablet (50 mg) by mouth At Bedtime 90 tablet 1 11/9/2019 at 2145     vitamin D3 (CHOLECALCIFEROL) 2000 units (50 mcg) tablet Take 1 tablet by mouth daily   11/10/2019 at 1030     blood glucose monitoring (ACCU-CHEK EDINSON PLUS) test strip Use to test blood sugar 4 times daily 400 each 3 Taking     blood glucose monitoring (ACCU-CHEK FASTCLIX) lancets Use to test blood sugar 4 times daily or as directed.  1 box = 102 lancets 408 each 3 Taking     [DISCONTINUED] aspirin (ASA) 81  "MG EC tablet Take 1 tablet (81 mg) by mouth daily 90 tablet 3 11/10/2019 at 1030time     [DISCONTINUED] carvedilol (COREG) 12.5 MG tablet TAKE 1 TABLET(12.5 MG) BY MOUTH TWICE DAILY WITH MEALS 180 tablet 3 11/10/2019 at 1030     [DISCONTINUED] dapagliflozin (FARXIGA) 10 MG TABS tablet Take 1 tablet (10 mg) by mouth daily 90 tablet 3 11/10/2019 at 1030     [DISCONTINUED] insulin degludec (TRESIBA) 200 UNIT/ML pen Take 70 units daily. (Patient taking differently: Take 70 units daily. Patient reports its a \"flexible\" scale and is currently injecting 60 units daily.) 100 mL 3 11/10/2019 at 1030     [DISCONTINUED] lactulose (CHRONULAC) 10 GM/15ML solution Take 45 mLs (30 g) by mouth 4 times daily 5000 mL 11 Past Month at Unknown time     [DISCONTINUED] metFORMIN (GLUCOPHAGE-XR) 500 MG 24 hr tablet Take 1 tablet (500 mg) by mouth daily (with breakfast) 90 tablet 3 11/10/2019 at 1730     [DISCONTINUED] NOVOLOG FLEXPEN 100 UNIT/ML soln INJECT 1 UNIT PER 4 GRAMS OF CARBS AT MEALS AND SNACKS. CORRECTION SCALE OF 1 UNITS PER 25 OVER [address] DOSE 75 UNITERS PER DAY 30 mL 3 11/10/2019 at Unknown time     [DISCONTINUED] potassium chloride ER (K-DUR/KLOR-CON M) 20 MEQ CR tablet Take 1 tablet (20 mEq) by mouth daily 90 tablet 3 11/10/2019 at 1030     [DISCONTINUED] rosuvastatin (CRESTOR) 20 MG tablet Take 1 tablet (20 mg) by mouth daily 60 tablet 2 11/9/2019 at 1730     [DISCONTINUED] XIFAXAN 550 MG TABS tablet TAKE 1 TABLET(550 MG) BY MOUTH TWICE DAILY 60 tablet 0 11/3/2019 at 1030             Discharge Medications:     Current Discharge Medication List      START taking these medications    Details   amLODIPine (NORVASC) 10 MG tablet Take 1 tablet (10 mg) by mouth daily  Qty: 30 tablet, Refills: 3    Associated Diagnoses: Liver transplant recipient (H)      loperamide (IMODIUM) 2 MG capsule Take 1 capsule (2 mg) by mouth 4 times daily as needed for diarrhea  Qty: 20 capsule, Refills: 1    Associated Diagnoses: Liver transplant " recipient (H)      mycophenolate (GENERIC EQUIVALENT) 250 MG capsule Take 3 capsules (750 mg) by mouth 2 times daily  Qty: 240 capsule, Refills: 11    Associated Diagnoses: Liver transplant recipient (H)      ondansetron (ZOFRAN-ODT) 4 MG ODT tab Take 1 tablet (4 mg) by mouth every 6 hours as needed for nausea or vomiting  Qty: 10 tablet, Refills: 0    Associated Diagnoses: Liver transplant recipient (H)      order for DME Equipment being ordered: Cane ()  Treatment Diagnosis: impaired gait  Qty: 1 each, Refills: 0    Associated Diagnoses: Liver transplant recipient (H)      oxyCODONE (ROXICODONE) 5 MG tablet Take 1 tablet (5 mg) by mouth every 6 hours as needed for moderate to severe pain  Qty: 20 tablet, Refills: 0    Associated Diagnoses: Liver transplant recipient (H)      sulfamethoxazole-trimethoprim (BACTRIM/SEPTRA) 400-80 MG tablet Take 1 tablet by mouth daily  Qty: 30 tablet, Refills: 11    Associated Diagnoses: Liver transplant recipient (H)      !! tacrolimus (GENERIC EQUIVALENT) 0.5 MG capsule Take 1 capsule (0.5 mg) by mouth 2 times daily Take a total of 4.5 mg BID  Qty: 60 capsule, Refills: 11    Associated Diagnoses: Liver transplant recipient (H)      !! tacrolimus (GENERIC EQUIVALENT) 0.5 MG capsule Take 8 capsules (4 mg) by mouth 2 times daily  Qty: 480 capsule, Refills: 11    Comments: Take a total dose of 4.5 mg BID.  Associated Diagnoses: Liver transplant recipient (H)      tacrolimus (GENERIC EQUIVALENT) 1 MG capsule Take 5 capsules (5 mg) by mouth 2 times daily  Qty: 300 capsule, Refills: 11    Comments: Take a total of 5.5 mg twice daily.  Associated Diagnoses: Liver transplant recipient (H)      tenofovir (VIREAD) 300 MG tablet Take 1 tablet (300 mg) by mouth daily  Qty: 30 tablet, Refills: 11    Associated Diagnoses: Liver transplant recipient (H)      valGANciclovir (VALCYTE) 450 MG tablet Take 1 tablet (450 mg) by mouth daily  Qty: 30 tablet, Refills: 5    Associated Diagnoses: Liver  transplant recipient (H)       !! - Potential duplicate medications found. Please discuss with provider.      CONTINUE these medications which have CHANGED    Details   aspirin (ASA) 325 MG EC tablet Take 1 tablet (325 mg) by mouth daily  Qty: 30 tablet, Refills: 3    Associated Diagnoses: Liver transplant recipient (H)      carvedilol (COREG) 25 MG tablet Take 1 tablet (25 mg) by mouth 2 times daily (with meals)  Qty: 60 tablet, Refills: 3    Associated Diagnoses: Liver transplant recipient (H)      !! insulin aspart (NOVOLOG PEN) 100 UNIT/ML pen Inject 1-7 Units Subcutaneous 3 times daily (with meals) DOSE:  1 units per 5 grams of carbohydrate.  Only chart total amount of units given.  Do not give if pre-prandial glucose is less than 60 mg/dL. If given at mealtime, administer within 30 minutes of start of meal  Qty: 3 mL, Refills: 3    Associated Diagnoses: Liver transplant recipient (H)      !! insulin aspart (NOVOLOG PEN) 100 UNIT/ML pen Inject 1-7 Units Subcutaneous Take with snacks or supplements for high blood sugar DOSE:  1 units per 5 grams of carbohydrate. Only chart total amount of units given.  Do not give if pre-prandial glucose is less than 60 mg/dL. If given at mealtime, administer within 30 minutes of start of meal    Associated Diagnoses: Liver transplant recipient (H)      !! insulin aspart (NOVOLOG PEN) 100 UNIT/ML pen Inject 1-10 Units Subcutaneous 3 times daily (with meals) Correction Scale - custom DOSING     Do Not give Correction Insulin if Pre-Meal BG less than 140    to 159 give 1 units.    to 179 give 2 units.    to 199 give 3 units.    to 219 give 4 units.    to 239 give 5 units.    to 259 give 6 units.    to 279 give 7 units.    to 299 give 8 units.    to 319 give 9 units.    to 339 give 10 units.   To be given with prandial insulin, and based on pre-meal blood glucose.   Notify provider if glucose greater than or equal 340  mg/dL after administration. If given at mealtime, administer within 30 minutes of start of meal  Qty: 3 mL    Associated Diagnoses: Liver transplant recipient (H)      !! insulin aspart (NOVOLOG PEN) 100 UNIT/ML pen Inject 1-11 Units Subcutaneous At Bedtime Correction Scale - custom DOSING (1:20)   to 219 give 1 units.    to 239 give 2 units.    to 259 give 3 units.    to 279 give 4 units.    to 299 give 5 units.    to 319 give 6 units.    to 339 give 7 units.    to 359 give 8 units    to 379 give 9 units.   to 399 give 10 units.  BG >/=400 give 11 units.  Notify provider if glucose greater than or equal to 400 mg/dL after administration. If given at mealtime, administer within 30 minutes of start of meal    Associated Diagnoses: Liver transplant recipient (H)      insulin degludec (TRESIBA) 200 UNIT/ML pen Take 55 units daily.  Qty: 100 mL, Refills: 3    Associated Diagnoses: Type 2 diabetes mellitus with hyperglycemia, with long-term current use of insulin (H)      rosuvastatin (CRESTOR) 5 MG tablet Take 1 tablet (5 mg) by mouth daily  Qty: 30 tablet, Refills: 3    Associated Diagnoses: Hyperlipidemia, unspecified hyperlipidemia type       !! - Potential duplicate medications found. Please discuss with provider.      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  Qty: 85 g, Refills: 0    Associated Diagnoses: Tinea pedis of both feet      ferrous sulfate (FEROSUL) 325 (65 Fe) MG tablet Take 1 tablet (325 mg) by mouth 3 times daily (with meals)  Qty: 90 tablet, Refills: 3    Associated Diagnoses: Liver cirrhosis secondary to ESTRADA (H)      Multiple Vitamin (THERAVITE PO) Take 1 tablet by mouth every morning       omeprazole (PRILOSEC) 40 MG DR capsule Take 1 capsule (40 mg) by mouth daily  Qty: 90 capsule, Refills: 2    Associated Diagnoses: Gastroesophageal reflux disease, esophagitis presence not specified      tamsulosin (FLOMAX) 0.4 MG capsule TAKE 1 CAPSULE(0.4 MG) BY MOUTH DAILY  Qty: 90 capsule, Refills: 3    Associated Diagnoses: Benign prostatic hyperplasia with lower urinary tract symptoms, symptom details unspecified      traZODone (DESYREL) 50 MG tablet Take 1 tablet (50 mg) by mouth At Bedtime  Qty: 90 tablet, Refills: 1    Associated Diagnoses: Insomnia, unspecified type      vitamin D3 (CHOLECALCIFEROL) 2000 units (50 mcg) tablet Take 1 tablet by mouth daily      blood glucose monitoring (ACCU-CHEK EDINSON PLUS) test strip Use to test blood sugar 4 times daily  Qty: 400 each, Refills: 3    Associated Diagnoses: Type II diabetes mellitus (H)      blood glucose monitoring (ACCU-CHEK FASTCLIX) lancets Use to test blood sugar 4 times daily or as directed.  1 box = 102 lancets  Qty: 408 each, Refills: 3    Associated Diagnoses: Type II diabetes mellitus (H)         STOP taking these medications       dapagliflozin (FARXIGA) 10 MG TABS tablet Comments:   Reason for Stopping:         lactulose (CHRONULAC) 10 GM/15ML solution Comments:   Reason for Stopping:         metFORMIN (GLUCOPHAGE-XR) 500 MG 24 hr tablet Comments:   Reason for Stopping:         potassium chloride ER (K-DUR/KLOR-CON M) 20  MEQ CR tablet Comments:   Reason for Stopping:         XIFAXAN 550 MG TABS tablet Comments:   Reason for Stopping:                     Consultations:   Consultation during this admission received from endocrinology, nutrition, pharmacy, occupational therapy, physical therapy           Brief History of Illness:   Frandy Workman is a 54 year old male with PMHx significant for cirrhosis secondary to ESTRADA diagnosed in 2013, HCC 2018, HTN, HLD, GERD, BPH and DMII who presented to [facility] for a liver transplant.         Hospital Course:   Patient underwent a DBD liver transplant on 11/11/19 with Dr. Ramos.  He returned to OR POD1 for ex lap, hematoma evacuation, and washout.    Liver transplant recipient:  LFTs  continue to trend down. Tbili 0.6 on discharge. Liver U/S on 11/14 demonstrated patent vessels with decreased resistive index of R HA. Was initially on heparin drip for hepatic artery prophylaxis; discontinued d/t bleeding. ASA ordered at 81mg; increased to 325mg POD #7 for ongoing prophylaxis. LUANNE #1 removed POD8. LUANNE #2 with decreasing serosanguinous output, plan to reassess in clinic on Monday with probable removal at that time. Incision approximated, sealed with staples and open to air.     Donor Hep B core positive: Donor Hep B core positive; tenofovir initiated POD2 (to continue indefinitely). Recipient Hep B negative, positive surface antibody. Hep B Surface Agn and viral DNA negative.    Donor UC +E. coli:  Zosyn x7 days completed.    Immunosuppressed status:   Induction: Solumedrol taper per protocol. Prednisone 5mg daily restarted POD7 while tac level was subtherapeutic; discontinued on discharge.  MMF: 750mg BID  FK: Tac level 16.4 (goal 10-12).  Dosed decreased to 4.5mg BID.    Type II diabetes mellitus + steroid-induced hyperglycemia: PTA was on Tresiba 60 units and Metformin. On insulin gtt with high rates post-op. Endocrine consulted. Patient is discharging home on 55 units Tresiba daily, aspart  1:5g CHO coverage with meals/snacks/supplements, aspart custom 1:20 intensity sliding scale >140 TID AC and >200 HS with BG monitoring AC & HS. [facility]-2 weeks post discharge 11/19 and subsequently with Dr. Montgomery 12/18.    Anemia due to blood loss, acute: Hgb 9.2 upon discharge. Last transfusion 11/14. Stable.    Thrombocytopenia: Plt 93 on discharge. HIT panel negative.      HTN (hypertension): SBPs  100-110's. PTA Coreg 25mg BID. Amlodipine 10mg daily.    Diarrhea: Likely TF-induced vs. Antibiotic-induced. Bowel regimen senokot-s and miralax BID discontinued and fiber added while patient was on TF. C-diff negative. Increase in  to Cr 1.47 with diarrhea; 1L bolus administered 11/19 and 11/20. Imodium ordered on discharge and patient encouraged to drink fluids.     Delirium with hallucinations: POD 3. Infectious workup negative. IV dilaudid discontinued. Likely related to poor sleep. Now resolved. Continue melatonin and trazadone.      Non-severe malnutrition related to chronic disease: Patient was on tube feeds post-operatively; NJ removed and TF now discontinued.  Multivitamin  daily. RD recommending High protein food choices with meals to help meet high needs post-transplant over the next 6-8 weeks, then heart-healthy diet (low saturated fat, low sodium, high fiber) and food safety precautions long term due to immunosuppression regimen post-transplant      Hypernatremia: Na up to 148  post-operatively; free water flushes ordered 100cc q1h via NJ. Now resolved.  Na 142 at discharge.    Hypophosphatasia: Supplemented with Phospha post-operatively. Now resolved with improved nutrition. Na 3.7 at discharge.             Physical Exam on the Day of Discharge:   Vital sign ranges:    Temp:  [97.8  F (36.6  C)-98.4  F (36.9  C)] 98.1  F (36.7  C)  Pulse:  [91] 91  Heart Rate:  [79-90] 79  Resp:  [16] 16  BP: (102-125)/(54-72) 102/72  SpO2:  [97 %-100 %] 100 %  Patient Vitals for the past  24 hrs:   BP Temp Temp src Pulse Heart Rate Resp SpO2   11/20/19 1121 102/72 98.1  F (36.7  C) Oral -- 79 16 100 %   11/20/19 1032 113/64 -- -- -- -- -- --   11/20/19 0747 114/59 98.2  F (36.8  C) Oral -- 90 16 100 %   11/20/19 0337 125/66 98.4  F (36.9  C) Oral -- 89 16 99 %   11/20/19 0015 116/63 98.2  F (36.8  C) Oral -- 89 16 97 %   11/19/19 1931 113/54 97.8  F (36.6  C) Oral 91 -- 16 100 %   11/19/19 1553 106/62 98  F (36.7  C) Oral -- 83 16 99 %       Constitutional: anxious  HEENT: no icterus  Lungs: non-labored breathing on RA  Cardiovascular: well-perfused  Abdomen: Incision stapled, approximated, no erythema or ecchymosis. Left LUANNE with serosanguinous output.  Genitourinary: Carroll removed.  Neurologic: A&Ox4.  Skin: warm, well perfused, no rashes noted  Lines: all vascular access lines removed upon discharge         Discharge Instructions and Follow-Up:   Discharge diet: Regular, practice food safety precautions   Discharge activity: Lifting restricted to <10 pounds. No driving while on narcotics or until you are able to depress gas pedal safely.   Discharge follow-up: Follow-up in [facility] on Monday. Follow-up with PCP in 1 week. Follow-up with endocrine as scheduled. Follow-up with Dr. Ramos, transplant coordinator to schedule.   Wound care: Keep wound clean and dry  Change LUANNE dressing daily and monitor and record output.           Discharge Disposition:   Discharged to home with 24 hour assistance         I spent a total of >30 minutes bedside and on the inpatient unit today managing the care of of Frandy Workman.  OVer 50% of my unit was spent counseling the patient and/or coordinating care regarding patient's dicharge.  See note for details.  Attestation:    The patient has been seen and evaluated by me.   Vital signs, labs, medications and orders were reviewed.   When obtained, diagnostic images were reviewed by me and interpreted as above.    The care plan was discussed with the multidisciplinary  team and I agree with the findings and plan in this note, with any differences recorded in blue.    Immunosuppressive medication management was reviewed and adjusted as reflected in the note and orders.     .

## 2019-11-20 NOTE — PROGRESS NOTES
[facility] Liaison met with pt to discuss plans for HC.  Pt to be discharged home 11/20  and has agreed to have [facility] follow with services of SN, HA, PT, and OT. Patient care support center processing referral.  Pt verbalized understanding that initial visit is scheduled for  11/21.   Pt has 24 hour phone number for [facility] for any questions or concerns.    Thank you  Gayle Jones RN, BSN  [facility]iaison  [facility]  [phone]

## 2019-11-20 NOTE — PLAN OF CARE
Discharge Planner OT   Patient plan for discharge: home  Current status: OT educated pt on AE for home set up and EC techniques. Pt ind UB and LB dressing, including dynamic reaching for clothing from pt's bag.   Barriers to return to prior living situation: medical status  Recommendations for discharge: Home w/ A  Rationale for recommendations: A prn for heavy lifting to adhere to precautions       Entered by: Shelby Patterson 11/20/2019 3:05 PM

## 2019-11-20 NOTE — PLAN OF CARE
/54 (BP Location: Left arm)   Pulse 91   Temp 97.8  F (36.6  C) (Oral)   Resp 16   Wt 80.2 kg (176 lb 12.8 oz)   SpO2 100%   BMI 26.11 kg/m       1500 - 2330  VS stable. Pt on room air. Patient denies pain. Patient denies nausea. BG - Sliding scale insulin and carbohydrate coverage insulin given. Urine Output - voiding adequately. Bowel Function - passing loose stool. Last BM on 11/19/19. Nutrition - Pt tolerating regular diet. Drains - Left abdominal LUANNE producing minimal output. Activity - Up independently in room. Education - Pt educated on Med card. Plan of Care - will continue with plan of care and notify care team of any changes.

## 2019-11-20 NOTE — PHARMACY-TRANSPLANT NOTE
Solid Organ Transplant Recipient Prior to Discharge Note    54 year old male s/p liver transplant on 11/11/19.    Pharmacy has monitored for medication interactions and immunosuppression levels in conjunction with the multidisciplinary team. In anticipation for discharge, medication therapy needs have been addressed daily throughout the current admission via multidisciplinary rounds and/or discussions, order verification, daily clinical pharmacy review, and communication with prescribers.  Malena Hicks, Pharm.D., [facility]  Pager [phone]

## 2019-11-20 NOTE — DISCHARGE INSTRUCTIONS
________________________________________________________  Discharge RN please fax discharge orders to home care agency: [facility]  ________________________________________________________      IP Diabetes Management Team Discharge Instructions    Glucose Control Regimen:   1) Hold your Metformin and Farxiga for now. Your blood sugars are in good control without them. They will be restarted when appropriate in outpatient follow up  2) Tresiba 55 units daily  3) Novolog mealtime insulin- use your AccuCheck meter recommendations for dosing as you were doing before    Blood Glucose Checks: three times daily before meals, and at bedtime.    Endocrinology Outpatient follow up: An appointment request was sent to the [facility] coordinator to schedule your outpatient diabetes appointment 1-2 weeks from discharge. Please call the clinic at [phone] if you do not have an appointment scheduled on discharge, or if you have non-urgent questions regarding your blood sugars or insulin.     If you have urgent questions or concerns regarding your blood sugars or insulin, you may contact [phone] (the main hospital ). Ask to speak with the endocrinologist on call.      Thank you for letting the Diabetes Management Team be involved in your care!

## 2019-11-20 NOTE — PLAN OF CARE
VS: /59 (BP Location: Left arm)   Pulse 91   Temp 98.2  F (36.8  C) (Oral)   Resp 16   Wt 80.2 kg (176 lb 12.8 oz)   SpO2 100%   BMI 26.11 kg/m      Current condition: Stable on RA  Neuro: WDL  Cardio: WDL  Respiratory: WDL  GI/: Voiding spontaneously, BM during shift  Diet: Regular  B at 0200  Skin: WDL  LDA:  LJP with 125 output. LPIV Saline locked.   Mobility: Independent  PRN medications: None given  Education:  Call light education  Plan of Care: Will Continue to monitor.

## 2019-11-20 NOTE — PROGRESS NOTES
Calorie Count  Intake recorded for: 11/19  Total Kcals: 1489 Total Protein: 79g  Kcals from Hospital Food: 1489  Protein: 79g  Kcals from Outside Food (average):0 Protein: 0g  # Meals Recorded: 3 meals (First - 100% coffee, milk, Greek yogurt, scrambled eggs w/ cheese, hash browns)      (Second - 100% vegetable soup, ice cream, 50% penne pasta w/ meat sauce)      (Third - 100% grilled cheese, potato soup w/ crackers, iced bong, blueberry muffin)  # Supplements Recorded: 100% 2 Boost Glucose Control

## 2019-11-20 NOTE — PROGRESS NOTES
IP Diabetes Management  Daily Note           Assessment and Plan:   HPI: Mr. 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  He returned to OR on POD1 for ex lap, hematoma evacuation and washout.    Assessment:   1)Type II Diabetes Mellitus with neuropathy and retinopathy   2) Enteral feed induced hyperglycemia, now resolved with discontinuation of tube feeds.     Plan:    -continue Lantus 55 units daily   -aspart 1:5g CHO coverage with meals and snacks/supplements   -aspart custom 1:20 intensity sliding scale >140 TID AC and >200 HS   -BG monitoring TID AC, HS, 0200   -hypoglycemia protocol   -regular diet with carb counting protocol    Reccs for Discharge: posted for patient in discharge AVS   -supplies needed: Tresiba and Novolog pens. He reports having meter and all supplies on hand   -resume Tresiba (substituted Lantus while hospitalized)- recommend continuing 55 units daily.   -may resume home Novolog mealtime and sliding scale dosing (1/25>125) per his meter settings.   -recommend continuing to hold  PTA metformin and farxiga (resume when appropriate in the outpatient setting)     Outpatient follow up: with [facility], 1-2 weeks post discharge (sent appt request 11/19 to clinic coordinator). Subsequent follow up with Dr. Wilcox 12/18  Plan discussed with patient, bedside RN, primary team.     Interval History and Assessment: interval glucose trend reviewed:   BG remaining stable on current insulin regimen.   Primary team endorsing plan to discharge today.   Patient reports that he found his meter; it is an Accucheck meter that gives bolus advice based upon BG values and amount of carbs documented for a meal. Reviewed the settings (calculates doses based upon BG rise of 50 along with carbs). Decided to keep bolus settings same for  discharge.   He is eating less than PTA currently, discussed with him that his Tresiba may need to be increased as his oral intake improves.   His caregiver Art is at bedside, reviewed recommendations. He is preparing to meet with dietician so he can grocery shop for appropriate foods for Miller.     Current nutritional intake and type: Orders Placed This Encounter      Regular Diet Adult      PTA Diabetes Regimen:   Tresiba 60-70 units qAM  aspart 1unit/4g CHO  aspart 1unit/25 for BG >125  Metformin ER 500mg with supper  Farxiga 10mg daily  Benjamin Flash for glucose monitoring and calculating aspart doses- aiming for before meals and bedtime.    Discharge Planning: home today.            Diabetes History:   Type of Diabetes: Type 2 Diabetes Mellitus  Lab Results   Component Value Date    A1C 6.6 08/08/2018    A1C 6.5 06/09/2017    A1C 7.8 10/25/2016              Review of Systems:   The Review of Systems is negative other than noted in the Interval History.           Medications:     Current Facility-Administered Medications   Medication     alpha-lipoic acid capsule 600 mg     amLODIPine (NORVASC) tablet 10 mg     aspirin (ASA) EC tablet 325 mg     bisacodyl (DULCOLAX) Suppository 10 mg     carvedilol (COREG) tablet 25 mg     dextrose 10 % 1,000 mL infusion     glucose gel 15-30 g    Or     dextrose 50 % injection 25-50 mL    Or     glucagon injection 1 mg     insulin aspart (NovoLOG) inj (RAPID ACTING)     insulin aspart (NovoLOG) inj (RAPID ACTING)     insulin aspart (NovoLOG) inj (RAPID ACTING)     insulin aspart (NovoLOG) inj (RAPID ACTING)     insulin glargine (LANTUS PEN) injection 55 Units     loperamide (IMODIUM) capsule 2 mg     multivitamin w/minerals (THERA-VIT-M) tablet 1 tablet     mycophenolate (GENERIC EQUIVALENT) capsule 750 mg     naloxone (NARCAN) injection 0.1-0.4 mg     nystatin (MYCOSTATIN) suspension 500,000 Units     omeprazole (priLOSEC) CR capsule 40 mg     ondansetron (ZOFRAN-ODT) ODT tab 4-8  mg     oxyCODONE (ROXICODONE) tablet 5 mg     phosphorus tablet 250 mg (PHOSPHA 250 NEUTRAL) per tablet 500 mg     polyethylene glycol (MIRALAX/GLYCOLAX) Packet 17 g     predniSONE (DELTASONE) tablet 5 mg     prochlorperazine (COMPAZINE) injection 10 mg    Or     prochlorperazine (COMPAZINE) Suppository 25 mg     senna-docusate (SENOKOT-S/PERICOLACE) 8.6-50 MG per tablet 1 tablet    Or     senna-docusate (SENOKOT-S/PERICOLACE) 8.6-50 MG per tablet 2 tablet     simethicone (MYLICON) chewable tablet 80 mg     sodium chloride (PF) 0.9% PF flush 3 mL     sodium chloride (PF) 0.9% PF flush 3 mL     sulfamethoxazole-trimethoprim (BACTRIM/SEPTRA) 400-80 MG per tablet 1 tablet     tacrolimus (GENERIC EQUIVALENT) capsule 5.5 mg     tamsulosin (FLOMAX) capsule 0.4 mg     tenofovir (VIREAD) tablet 300 mg     traZODone (DESYREL) tablet 50 mg     valGANciclovir (VALCYTE) tablet 450 mg            Physical Exam:    /59 (BP Location: Left arm)   Pulse 91   Temp 98.2  F (36.8  C) (Oral)   Resp 16   Wt 80.2 kg (176 lb 12.8 oz)   SpO2 100%   BMI 26.11 kg/m    General: pleasant, in no distress. Friend Art is at bedside and attentive.   HEENT: normocephalic, atraumatic. Oral mucous membranes moist.   Lungs: unlabored respiration, no cough  ABD: rounded, soft, no lipodystrophy noted  Skin: warm and dry, no obvious lesions  MSK:  moves all extremities  Lymp:  no LE edema   Mental status:  alert, oriented to self, place, time  Psych:  affect, calm and appropriate interaction             Data:     Recent Labs   Lab 11/20/19  0827 11/20/19  0636 11/20/19  0201 11/19/19  2154 11/19/19  1753 11/19/19  1710 11/19/19  1359  11/19/19  0611  11/18/19  0437  11/17/19  0706  11/16/19  0648  11/15/19  0449   GLC  --  116*  --   --   --   --   --   --  141*  --  89  --  136*  --  124*  --  134*   *  --  150* 133* 186* 202* 169*   < >  --    < >  --    < >  --    < >  --    < >  --     < > = values in this interval not displayed.      Lab Results   Component Value Date    WBC 6.5 11/20/2019    WBC 4.7 11/19/2019    WBC 5.7 11/18/2019    HGB 9.2 (L) 11/20/2019    HGB 9.0 (L) 11/19/2019    HGB 9.9 (L) 11/18/2019    HCT 28.4 (L) 11/20/2019    HCT 27.4 (L) 11/19/2019    HCT 30.7 (L) 11/18/2019    MCV 95 11/20/2019    MCV 94 11/19/2019    MCV 95 11/18/2019    PLT 93 (L) 11/20/2019    PLT 59 (L) 11/19/2019    PLT 50 (L) 11/18/2019     Lab Results   Component Value Date     11/20/2019     11/19/2019     11/18/2019    POTASSIUM 4.6 11/20/2019    POTASSIUM 4.1 11/19/2019    POTASSIUM 4.1 11/18/2019    CHLORIDE 115 (H) 11/20/2019    CHLORIDE 113 (H) 11/19/2019    CHLORIDE 112 (H) 11/18/2019    CO2 21 11/20/2019    CO2 21 11/19/2019    CO2 23 11/18/2019     (H) 11/20/2019     (H) 11/19/2019    GLC 89 11/18/2019     Lab Results   Component Value Date    BUN 30 11/20/2019    BUN 32 (H) 11/19/2019    BUN 33 (H) 11/18/2019     Lab Results   Component Value Date    TSH 0.49 08/08/2018    TSH 0.71 02/08/2018    TSH 0.42 06/09/2017     Lab Results   Component Value Date    AST 22 11/20/2019    AST 40 11/19/2019    AST 59 (H) 11/18/2019    ALT 66 11/20/2019    ALT 87 (H) 11/19/2019     (H) 11/18/2019    ALKPHOS 184 (H) 11/20/2019    ALKPHOS 203 (H) 11/19/2019    ALKPHOS 258 (H) 11/18/2019       Diabetes Management Team job code: [id number]  I spent a total of 25 minutes bedside and on the inpatient unit managing glycemic care. Over 50% of my time on the unit was spent counseling the patient and/or coordinating care regarding acute hyperglycemia management.  See note for details.    Lanie Atkinson PA-C  Inpatient Diabetes Management Service  Pager [phone]

## 2019-11-20 NOTE — PROGRESS NOTES
CLINICAL NUTRITION SERVICES - BRIEF/DISCHARGE NOTE     Nutrition Prescription    Future/Additional Recommendations:  Minimize diet restrictions as able d/t high calorie/protein needs post-transplant.  Oral supplements as needed to help meet nutritional needs.     High protein food choices with meals to help meet high needs post-transplant over the next 6-8 weeks.     Heart-healthy diet (low saturated fat, low sodium, high fiber) and food safety precautions long term due to immunosuppression regimen post-transplant         EVALUATION OF THE PROGRESS TOWARD GOALS     INTERVENTIONS:  Spoke with patient's friend Davi re: an appropriate diet for patient.  Emphasized the need for high calorie, high protein diet x 6-8 weeks.  Reviewed high calorie, high protein foods that his usual intake can be supplemented with.  The patient and Art do not cook, so they rely on frozen meals.  Encouraged supplementing these meals with milk, oral supplements (Boost Glucose Control), nuts/seeds, cheese, and yogurt to get more calories/protein.      Patient s discharge needs assessed and discharge planning has been conducted with the multidisciplinary transplant care team including physicians, pharmacy, social work and transplant coordinator.     Monitoring/Evaluation  Progress toward goals will be monitored and evaluated per protocol.    Once discharged, place outpatient nutrition consult via the transplant team if nutrition concerns arise.    Wendy Crandall MS, RD, LD  Pager [phone]

## 2019-11-20 NOTE — PLAN OF CARE
Discharge Planner PT   Patient plan for discharge: Home with assist from friend for household management  Current status: Pt is anxious about returning home.  He is mobilizing well using a cane, this improves his balance and gait.  Will issue cane for discharge.  Reviewed abdominal precautions.  Barriers to return to prior living situation: medical status  Recommendations for discharge: Home with assist from friend for laundry, groceries, transportation, heavy household chores  Rationale for recommendations: Pt is mobilizing adequately for home discharge.  Will need assist for above things to promote healing and adhere to post-op precautions.        Entered by: Kaylin Gupta 11/20/2019 11:00 AM

## 2019-11-20 NOTE — PROGRESS NOTES
Transplant Social Work Service Discharge Note      Patient Name:  Frandy Workman     Anticipated Discharge Date:  11/20/2019     Discharge Disposition:   Home Care:  [facility]    Plan for 24 hour care for immediate post transplant period: Patient will be cared for by a friend Art in his home    If not local, plans for short term stay:  N/A    Additional Services/Equipment Arranged:  RNCC set up appropriate home care through [facility] visits     Persons notified of above discharge plan:  Patient, patient's caregiver, transplant team, nursing staff, homecare    Patient / Family response to discharge plan:  Agreeable    Education and resources provided by  at discharge: role of transplant  in out patient setting and contact info for     Discussed anticipated pharmacy out of pocket costs: YES    Provided [employer] Donor Letter Writing packet : YES    Plan: Patient to discharge home and receive care from his friend Art. Patient will then go to [facility] and receive home care through [facility]. Patient agreeable to plan.     ALEXYS Kee, FARHAD  7A   Ph: [phone]  Pager: [phone]

## 2019-11-21 ENCOUNTER — TELEPHONE (OUTPATIENT)
Dept: TRANSPLANT | Facility: CLINIC | Age: 54
End: 2019-11-21

## 2019-11-21 ENCOUNTER — MEDICAL CORRESPONDENCE (OUTPATIENT)
Dept: HEALTH INFORMATION MANAGEMENT | Facility: CLINIC | Age: 54
End: 2019-11-21

## 2019-11-21 LAB
ALBUMIN SERPL-MCNC: 2.4 G/DL (ref 3.4–5)
ALP SERPL-CCNC: 210 U/L (ref 40–150)
ALT SERPL W P-5'-P-CCNC: 53 U/L (ref 0–70)
ANION GAP SERPL CALCULATED.3IONS-SCNC: 5 MMOL/L (ref 3–14)
AST SERPL W P-5'-P-CCNC: 21 U/L (ref 0–45)
BACTERIA SPEC CULT: NO GROWTH
BACTERIA SPEC CULT: NO GROWTH
BILIRUB DIRECT SERPL-MCNC: 0.2 MG/DL (ref 0–0.2)
BILIRUB SERPL-MCNC: 0.8 MG/DL (ref 0.2–1.3)
BUN SERPL-MCNC: 25 MG/DL (ref 7–30)
CALCIUM SERPL-MCNC: 8 MG/DL (ref 8.5–10.1)
CHLORIDE SERPL-SCNC: 114 MMOL/L (ref 94–109)
CO2 SERPL-SCNC: 21 MMOL/L (ref 20–32)
CREAT SERPL-MCNC: 1.33 MG/DL (ref 0.66–1.25)
ERYTHROCYTE [DISTWIDTH] IN BLOOD BY AUTOMATED COUNT: 18 % (ref 10–15)
GFR SERPL CREATININE-BSD FRML MDRD: 59 ML/MIN/{1.73_M2}
GLUCOSE SERPL-MCNC: 129 MG/DL (ref 70–99)
HCT VFR BLD AUTO: 29.5 % (ref 40–53)
HGB BLD-MCNC: 9.6 G/DL (ref 13.3–17.7)
Lab: NORMAL
Lab: NORMAL
MAGNESIUM SERPL-MCNC: 2.5 MG/DL (ref 1.6–2.3)
MCH RBC QN AUTO: 31.2 PG (ref 26.5–33)
MCHC RBC AUTO-ENTMCNC: 32.5 G/DL (ref 31.5–36.5)
MCV RBC AUTO: 96 FL (ref 78–100)
PHOSPHATE SERPL-MCNC: 3.6 MG/DL (ref 2.5–4.5)
PLATELET # BLD AUTO: 129 10E9/L (ref 150–450)
POTASSIUM SERPL-SCNC: 4 MMOL/L (ref 3.4–5.3)
PROT SERPL-MCNC: 6 G/DL (ref 6.8–8.8)
RBC # BLD AUTO: 3.08 10E12/L (ref 4.4–5.9)
SODIUM SERPL-SCNC: 140 MMOL/L (ref 133–144)
SPECIMEN SOURCE: NORMAL
SPECIMEN SOURCE: NORMAL
TACROLIMUS BLD-MCNC: 19.5 UG/L (ref 5–15)
TME LAST DOSE: ABNORMAL H
WBC # BLD AUTO: 7.4 10E9/L (ref 4–11)

## 2019-11-21 PROCEDURE — 80076 HEPATIC FUNCTION PANEL: CPT | Performed by: SURGERY

## 2019-11-21 PROCEDURE — 80048 BASIC METABOLIC PNL TOTAL CA: CPT | Performed by: SURGERY

## 2019-11-21 PROCEDURE — 83735 ASSAY OF MAGNESIUM: CPT | Performed by: SURGERY

## 2019-11-21 PROCEDURE — 85027 COMPLETE CBC AUTOMATED: CPT | Performed by: SURGERY

## 2019-11-21 PROCEDURE — 84100 ASSAY OF PHOSPHORUS: CPT | Performed by: SURGERY

## 2019-11-21 PROCEDURE — 80197 ASSAY OF TACROLIMUS: CPT | Performed by: SURGERY

## 2019-11-21 NOTE — PLAN OF CARE
Physical Therapy Discharge Summary    Reason for therapy discharge:    Discharged to home.    Progress towards therapy goal(s). See goals on Care Plan in [facility] electronic health record for goal details.  Goals partially met.  Barriers to achieving goals:   discharge from facility.    Therapy recommendation(s):    No further therapy is recommended. Recommend home with assist from friend for laundry, groceries, transportation, heavy household chores. Single end cane issued for use at discharge.

## 2019-11-21 NOTE — TELEPHONE ENCOUNTER
Patient chooses to receive medications from  specialty pharmacy.  Updated pharmacy patient call list. Notified pharmacy.      Juanpablo Clark, R.Ph.  [facility]  [phone] pager

## 2019-11-21 NOTE — PLAN OF CARE
Occupational Therapy Discharge Summary    Reason for therapy discharge:    Discharged to home with home therapy.    Progress towards therapy goal(s). See goals on Care Plan in [facility] electronic health record for goal details.  Goals partially met.  Barriers to achieving goals:   discharge from facility.    Therapy recommendation(s):    Continued therapy is recommended.  Rationale/Recommendations:  home therapy recommended to increase safety and IND with ADL/IADL upon return home.

## 2019-11-21 NOTE — PLAN OF CARE
DISCHARGE:  D: Patient with orders to discharge to home.  I: Discharge instructions, medications & follow ups reviewed with patient. LUANNE drain care education at bedside with caregiver present, supplies sent home for cares, pt to record output in back of lab book. Medication card updated, thorough review of medication regimen done at bedside with caregiver present. Pt to demonstrate blood glucose monitoring, lab value recording, LUANNE drain cares and recording output and worked through blood glucose sliding scale and carb coverage scenarios for practice. Copy of discharge summary given to Patient. 1L NS bolus completed, PIV removed. All belongings packed & sent with patient. Medications filled & picked up at discharge pharmacy. Paperwork faxed.   A: Patient in stable condition. AVSS. Patient had no further questions regarding discharge instructions and medications. Patient transferred out by wheelchair& left with caregiver, Art.  P: Plan for follow-up appointment at [facility] Monday.

## 2019-11-22 ENCOUNTER — TELEPHONE (OUTPATIENT)
Dept: TRANSPLANT | Facility: CLINIC | Age: 54
End: 2019-11-22

## 2019-11-22 DIAGNOSIS — Z94.4 LIVER TRANSPLANT RECIPIENT (H): ICD-10-CM

## 2019-11-22 DIAGNOSIS — E11.9 TYPE II DIABETES MELLITUS (H): ICD-10-CM

## 2019-11-22 DIAGNOSIS — K76.82 HEPATIC ENCEPHALOPATHY (H): ICD-10-CM

## 2019-11-22 RX ORDER — RIFAXIMIN 550 MG/1
TABLET ORAL
Qty: 60 TABLET | Refills: 0 | OUTPATIENT
Start: 2019-11-22

## 2019-11-22 RX ORDER — TACROLIMUS 1 MG/1
2 CAPSULE ORAL 2 TIMES DAILY
Qty: 120 CAPSULE | Refills: 11 | Status: ON HOLD | OUTPATIENT
Start: 2019-11-22 | End: 2019-12-10

## 2019-11-22 NOTE — TELEPHONE ENCOUNTER
Patient Call: Voicemail  Date/Time: 11/22/2019  0753  Reason for call: Patient returning call  Ready to take his meds this AM  Call him rob

## 2019-11-22 NOTE — TELEPHONE ENCOUNTER
Pt calls to state that he was having his regular semi formed stool this morning when he noticed it ws black in color. No abdominal pain outside of incisional, no temp. After reviewing pt's med list, confirmed with pt that he taking an iron supplement TID. Informed pt that iron supplements will cause dark stools. Suggested pt keep monitoring. GIOVANNI.

## 2019-11-22 NOTE — TELEPHONE ENCOUNTER
Left message for patient to return call regarding tacrolimus level.   Pt's tacrolimus level 19.5. need to check trough level. Patient currently taking 4.5 mg every 12 hours.    If accurate trough will hold this mornings dose and decrease to 2 mg every 12 hours.

## 2019-11-22 NOTE — TELEPHONE ENCOUNTER
Spoke with Miller. Pt already took his dose this morning. Patient will decrease to 2 mg every 12 hours. Pt repeated dose change. Reported that he updated his medication card and would updated his medication box. Patient will call back with any questions or concerns when he updates his medication box.

## 2019-11-22 NOTE — TELEPHONE ENCOUNTER
ACCU-CHEK EDINSON PLUS STRIPS 100'S   Last Written Prescription Date:  10/13/2017  Last Fill Quantity: 400,   # refills: 3  Last Office Visit : 10/1/2019  Future Office visit:  11/26/2019  Sending order to alternative pharmacy per Pt request.  400 each, 6 Refills sent to pharmacy 11/22/2019    Marsha Chung RN  Central Triage Red Flags/Med Refills

## 2019-11-23 DIAGNOSIS — G47.00 INSOMNIA, UNSPECIFIED TYPE: ICD-10-CM

## 2019-11-25 ENCOUNTER — INFUSION THERAPY VISIT (OUTPATIENT)
Dept: INFUSION THERAPY | Facility: CLINIC | Age: 54
End: 2019-11-25
Attending: TRANSPLANT SURGERY
Payer: MEDICARE

## 2019-11-25 ENCOUNTER — ALLIED HEALTH/NURSE VISIT (OUTPATIENT)
Dept: TRANSPLANT | Facility: CLINIC | Age: 54
End: 2019-11-25

## 2019-11-25 ENCOUNTER — OFFICE VISIT (OUTPATIENT)
Dept: PHARMACY | Facility: CLINIC | Age: 54
End: 2019-11-25
Payer: COMMERCIAL

## 2019-11-25 VITALS
WEIGHT: 169.6 LBS | BODY MASS INDEX: 25.05 KG/M2 | RESPIRATION RATE: 16 BRPM | SYSTOLIC BLOOD PRESSURE: 129 MMHG | OXYGEN SATURATION: 98 % | DIASTOLIC BLOOD PRESSURE: 70 MMHG | TEMPERATURE: 98 F

## 2019-11-25 DIAGNOSIS — Z94.4 LIVER TRANSPLANT RECIPIENT (H): Primary | ICD-10-CM

## 2019-11-25 DIAGNOSIS — Z94.4 STATUS POST LIVER TRANSPLANTATION (H): Primary | ICD-10-CM

## 2019-11-25 DIAGNOSIS — D84.9 IMMUNOSUPPRESSION (H): Primary | ICD-10-CM

## 2019-11-25 LAB
ALBUMIN SERPL-MCNC: 2.7 G/DL (ref 3.4–5)
ALP SERPL-CCNC: 229 U/L (ref 40–150)
ALT SERPL W P-5'-P-CCNC: 32 U/L (ref 0–70)
ANION GAP SERPL CALCULATED.3IONS-SCNC: 7 MMOL/L (ref 3–14)
AST SERPL W P-5'-P-CCNC: 28 U/L (ref 0–45)
BASOPHILS # BLD AUTO: 0 10E9/L (ref 0–0.2)
BASOPHILS NFR BLD AUTO: 0.5 %
BILIRUB DIRECT SERPL-MCNC: 0.2 MG/DL (ref 0–0.2)
BILIRUB SERPL-MCNC: 0.6 MG/DL (ref 0.2–1.3)
BUN SERPL-MCNC: 40 MG/DL (ref 7–30)
CALCIUM SERPL-MCNC: 9.6 MG/DL (ref 8.5–10.1)
CHLORIDE SERPL-SCNC: 112 MMOL/L (ref 94–109)
CO2 SERPL-SCNC: 18 MMOL/L (ref 20–32)
CREAT SERPL-MCNC: 1.81 MG/DL (ref 0.66–1.25)
DIFFERENTIAL METHOD BLD: ABNORMAL
EOSINOPHIL # BLD AUTO: 0.3 10E9/L (ref 0–0.7)
EOSINOPHIL NFR BLD AUTO: 3.4 %
ERYTHROCYTE [DISTWIDTH] IN BLOOD BY AUTOMATED COUNT: 19.8 % (ref 10–15)
GFR SERPL CREATININE-BSD FRML MDRD: 41 ML/MIN/{1.73_M2}
GLUCOSE SERPL-MCNC: 58 MG/DL (ref 70–99)
HCT VFR BLD AUTO: 29.1 % (ref 40–53)
HGB BLD-MCNC: 9.6 G/DL (ref 13.3–17.7)
IMM GRANULOCYTES # BLD: 0.1 10E9/L (ref 0–0.4)
IMM GRANULOCYTES NFR BLD: 1.4 %
LYMPHOCYTES # BLD AUTO: 0.4 10E9/L (ref 0.8–5.3)
LYMPHOCYTES NFR BLD AUTO: 5.7 %
MAGNESIUM SERPL-MCNC: 2.2 MG/DL (ref 1.6–2.3)
MCH RBC QN AUTO: 31.6 PG (ref 26.5–33)
MCHC RBC AUTO-ENTMCNC: 33 G/DL (ref 31.5–36.5)
MCV RBC AUTO: 96 FL (ref 78–100)
MONOCYTES # BLD AUTO: 0.6 10E9/L (ref 0–1.3)
MONOCYTES NFR BLD AUTO: 7.8 %
NEUTROPHILS # BLD AUTO: 5.9 10E9/L (ref 1.6–8.3)
NEUTROPHILS NFR BLD AUTO: 81.2 %
NRBC # BLD AUTO: 0 10*3/UL
NRBC BLD AUTO-RTO: 0 /100
PHOSPHATE SERPL-MCNC: 3.6 MG/DL (ref 2.5–4.5)
PLATELET # BLD AUTO: 187 10E9/L (ref 150–450)
POTASSIUM SERPL-SCNC: 5.2 MMOL/L (ref 3.4–5.3)
PROT SERPL-MCNC: 7.1 G/DL (ref 6.8–8.8)
RBC # BLD AUTO: 3.04 10E12/L (ref 4.4–5.9)
SODIUM SERPL-SCNC: 136 MMOL/L (ref 133–144)
TACROLIMUS BLD-MCNC: 9.3 UG/L (ref 5–15)
TME LAST DOSE: NORMAL H
WBC # BLD AUTO: 7.3 10E9/L (ref 4–11)

## 2019-11-25 PROCEDURE — 85025 COMPLETE CBC W/AUTO DIFF WBC: CPT | Performed by: TRANSPLANT SURGERY

## 2019-11-25 PROCEDURE — 84100 ASSAY OF PHOSPHORUS: CPT | Performed by: TRANSPLANT SURGERY

## 2019-11-25 PROCEDURE — 80048 BASIC METABOLIC PNL TOTAL CA: CPT | Performed by: TRANSPLANT SURGERY

## 2019-11-25 PROCEDURE — 36415 COLL VENOUS BLD VENIPUNCTURE: CPT

## 2019-11-25 PROCEDURE — G0463 HOSPITAL OUTPT CLINIC VISIT: HCPCS

## 2019-11-25 PROCEDURE — 83735 ASSAY OF MAGNESIUM: CPT | Performed by: TRANSPLANT SURGERY

## 2019-11-25 PROCEDURE — 99606 MTMS BY PHARM EST 15 MIN: CPT | Performed by: PHARMACIST

## 2019-11-25 PROCEDURE — 80076 HEPATIC FUNCTION PANEL: CPT | Performed by: TRANSPLANT SURGERY

## 2019-11-25 PROCEDURE — 80197 ASSAY OF TACROLIMUS: CPT | Performed by: TRANSPLANT SURGERY

## 2019-11-25 PROCEDURE — 99607 MTMS BY PHARM ADDL 15 MIN: CPT | Performed by: PHARMACIST

## 2019-11-25 RX ORDER — TRAZODONE HYDROCHLORIDE 50 MG/1
50 TABLET, FILM COATED ORAL AT BEDTIME
Qty: 90 TABLET | Refills: 1 | Status: ON HOLD | OUTPATIENT
Start: 2019-11-25 | End: 2019-12-10

## 2019-11-25 RX ORDER — AMLODIPINE BESYLATE 5 MG/1
5 TABLET ORAL DAILY
Qty: 30 TABLET | Refills: 3 | Status: SHIPPED | OUTPATIENT
Start: 2019-11-25 | End: 2019-12-02

## 2019-11-25 NOTE — Clinical Note
Recommend drawing iron indices, patient has been taking Iron TID for quite awhile without monitoring.

## 2019-11-25 NOTE — PROGRESS NOTES
[facility]       SUBJECTIVE:  Frandy Workman is a 54 year old male with PMH notable for DM type II, ESTRADA cirrhosis, hepaocellular carcinoma s/p TACE (1/2018), now s/p liver transplant \who presents today for hospital follow up.      Patient was recently hospitalized from 11/20-11/20 during which time he underwent a liver transplant. Liver donor was noted have E coli on urine culture and hep B core positive. Patient has completed a 7 day course of zosyn and was initiated on tenofovir on POD2 which will be continued indefinitely. Current immunosuppression is  mg BID, tacrolimus 2 mg BID. He is currently on bactrim and valganciclovir for ppx.     Prior to admission, patient did carry dx of DMII, and was previously on treisba 60 U and metformin. In the setting of high dose steroids, he was ultimately discharged on Tresiba 55 U daily, aspart 1:5g CHO coverage with meals/snacks and aspart custom 1:20 sliding scale insulin >140 TID AC and >200 HS.     Patient reports that since discharge, his blood sugars have stayed consistently between 50s-80s with frequent daily lows documented on his glucometer that he provided in clinic today. He is symptomatic during episodes and has woken up several times from lows at home. Has used his sliding scale once since discharge but continues to use 55 U Tresiba QAM.     He reports his intake inadequate and has been frequently missing meals due to appointments and having no food on hand to eat. His friend Davi recently returned to [place] who had been staying with him since he was discharged. Patient is very nervous about what will happen to him in the near future and voiced concern about not being able to care for himself after Monday. His concerns primarily lie in a great deal of confusion and concern that he is unable to follow instructions. He otherwise denies any fevers, chills, shortness of breath, pain or drainage from surgical site, syncope,  change in bowel or bladder habits or headache.       Medications and allergies reviewed by me today.     ROS:   Constitutional, neuro, ENT, endocrine, pulmonary, cardiac, gastrointestinal, genitourinary, musculoskeletal, integument and psychiatric systems are negative, except as otherwise noted.    Patient Active Problem List   Diagnosis     Hepatic cirrhosis (H)     Type II diabetes mellitus (H)     Bipolar affective disorder in remission (H)     Esophageal varices (H)     Nonalcoholic steatohepatitis (ESTRADA)     Brow ptosis     Paralytic lagophthalmos of right upper eyelid     Erectile dysfunction due to diseases classified elsewhere     HCC (hepatocellular carcinoma) (H)     Equivocal stress echocardiogram     Type 2 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, unspecified whether long term insulin use (H)     Abnormal findings diagnostic imaging of heart and coronary circulation     Status post coronary angiogram     CAD (coronary artery disease)     Portal vein thrombosis     Liver transplant recipient (H)     Status post liver transplantation (H)     Immunosuppressed status (H)     Anemia due to blood loss, acute     Thrombocytopenia (H)     HTN (hypertension)     Diarrhea     Delirium     Malnutrition related to chronic disease (H)     Hypernatremia     Hypophosphatasia     JERONIMO (acute kidney injury) (H)     Past Surgical History:   Procedure Laterality Date     COLONOSCOPY      2015     CV HEART CATHETERIZATION WITH POSSIBLE INTERVENTION N/A 2/26/2019    Procedure: CORS;  Surgeon: Jagdish Hoyt MD;  Location:  HEART CARDIAC CATH LAB     ESOPHAGOSCOPY, GASTROSCOPY, DUODENOSCOPY (EGD), COMBINED N/A 11/17/2016    Procedure: COMBINED ESOPHAGOSCOPY, GASTROSCOPY, DUODENOSCOPY (EGD);  Surgeon: Santi Rosas MD;  Location:  GI     ESOPHAGOSCOPY, GASTROSCOPY, DUODENOSCOPY (EGD), COMBINED N/A 11/17/2017    Procedure: COMBINED ESOPHAGOSCOPY,  GASTROSCOPY, DUODENOSCOPY (EGD);  EGD;  Surgeon: Santi Rosas MD;  Location: [facility] GI     ESOPHAGOSCOPY, GASTROSCOPY, DUODENOSCOPY (EGD), COMBINED N/A 2018    Procedure: EGD;  Surgeon: Santi Rosas MD;  Location:  OR     HEAD & NECK SURGERY      2017 at [facility].      IMPLANT GOLD WEIGHT EYELID Right 2017    Procedure: IMPLANT WEIGHT EYELID;  Right Upper Eyelid Weight, right tarsal strip lower eyelid;  Surgeon: Milana Malave MD;  Location:  OR     IR CHEMO EMBOLIZATION  2019     KNEE SURGERY Left      ORTHOPEDIC SURGERY       PAROTIDECTOMY, RADICAL NECK DISSECTION Right 2017    Procedure: PAROTIDECTOMY, RADICAL NECK DISSECTION;  Right Superfacial Parotidectomy , Facial nerve repair. with [facility] facial nerve monitor.;  Surgeon: Asiya Morgan MD;  Location: [facility] OR     PICC INSERTION Left 2017    4fr SL BioFlo PICC, 44cm in the L basilic vein w/ tip in the low SVC     RETURN LIVER TRANSPLANT N/A 2019    Procedure: Exploratory laparotomy, hematoma evacuation, abdominal washout;  Surgeon: Александр Ramos MD;  Location: [facility] OR     TRANSPLANT LIVER RECIPIENT  DONOR N/A 2019    Procedure: TRANSPLANT, LIVER, RECIPIENT,  DONOR;  Surgeon: Александр Ramos MD;  Location: [facility] OR     VASCULAR SURGERY       Family History   Problem Relation Age of Onset     Prostate Cancer Maternal Grandfather      Substance Abuse Maternal Grandfather         Alcohol     Colon Cancer Father 60     Pancreatic Cancer Father 60     Prostate Cancer Father      Colorectal Cancer Father      Macular Degeneration Father      Cancer Father      Glaucoma Father      Skin Cancer Father      Colorectal Cancer Maternal Grandmother      Cancer Maternal Grandmother      Substance Abuse Maternal Grandmother         Alcohol     Colorectal Cancer Paternal Grandmother      Cancer Mother      Diabetes Mother          3/2016     Cerebrovascular Disease Mother         Passed  away in Feb of this year, 80 years old.     Thyroid Disease Mother      Depression Mother      Asthma Sister         Had since birth     Thyroid Disease Sister      Depression Sister      Liver Disease No family hx of      Melanoma No family hx of      Social History     Tobacco Use     Smoking status: Never Smoker     Smokeless tobacco: Former User     Types: Chew     Tobacco comment: 1 tin per week   Substance Use Topics     Alcohol use: No     Alcohol/week: 0.0 standard drinks     Comment: quit Sept. 1996     Drug use: No     meds reviewed.    Allergies   Allergen Reactions     Codeine Other (See Comments)     Cannot take due to liver  Cannot tolerate oral narcotics     Seasonal Allergies      Sneezing, coughing, runny and itchy eyes       .  OBJECTIVE:  BP 99/60   Pulse 86   Temp 98.1  F (36.7  C) (Oral)   Resp 16   Wt 78.3 kg (172 lb 11.2 oz)   SpO2 98%   BMI 25.50 kg/m     Wt Readings from Last 1 Encounters:   11/26/19 78.3 kg (172 lb 11.2 oz)     Body mass index is 25.5 kg/m .      GENERAL APPEARANCE: temporal wasting, ill appearing     EYES: EOMI,  PERRL, anicteric     HENT: nose and mouth without ulcers or lesions     NECK: no adenopathy, no asymmetry, or masses; Past CVC site c/d/i      RESP: lungs clear to auscultation - no rales, rhonchi or wheezes. Non-labored     CV: regular rates and rhythm, normal S1 S2, no murmur, click or rub     ABDOMEN:  Surgical site c/d/i, staples in place, mild RUQ ttp, +BS     MS: thin appearing extremities, no evidence of inflammation in joints, FROM in all extremities.     SKIN: no suspicious lesions or rashes over exposed areas, surgical site as above      NEURO: Frequently repeating questions and answers, aao x3     ASSESSMENT/PLAN:    Frandy was seen today for establish care.    Diagnoses and all orders for this visit:    #. Type 2 diabetes mellitus with other specified complication, with long-term current use of insulin (H)  #. Recurrent hypoglycemia  -      glucose (BD GLUCOSE) 4 g chewable tablet; Take 1 tablet by mouth every hour as needed for low blood sugar  - Decrease Tresiba to 36 U per day  - Will hold sliding scale insulin at this time  - Continue monitoring BG QAC and HS  - Pharmacist provided education and instruction on insulin use  - Endocrine follow up on 12/18  - Will RTC next Monday to re-evaluate insulin regimen    #. S/P DBD liver transplant (11/11/19  Recently met with transplant surgery yesterday and LUANNE drain was pulled at that time.  - Continue cellcept 750 mg BID, tacro 2 mg BID  - Continue tenofovir indefinitely   - Staples currently in place, will discuss with coordinator and transplant sx regarding removal  - Will discuss with txp coordinator re: additional need for resources in near future       Pt should return to clinic for f/u with me in 7 days    Options for treatment and follow-up care were reviewed with the patient. Frandy Workman engaged in the decision making process and verbalized understanding of the options discussed and agreed with the final plan.    Maximo Tucker, DO  Internal Medicine, PGY3  Nov 26, 2019    Patient seen and plan of care discussed with Dr. Moe  Teaching Physician Note:  I was present during the visit and the patient was seen and examined by me.   I discussed the case with the resident and agree with the note as documented by the resident with the following exceptions:  None.    Nerissa Moe M.D.  Internal Medicine   pager [phone]

## 2019-11-25 NOTE — PROGRESS NOTES
Transplant Surgery -OUTPATIENT IMMUNOSUPPRESSION PROGRESS NOTE    Date of Visit: 11/25/2019  Immunosuppression Note:    Frandy Workman is a 54 year old male who is seen today  for immunosuppression management     I, Yonathan Chino MD, I have examined the patient with the resident/PA/Fellow, discussed and agree with the note and findings.  I have reviewed today's vital signs, medications, labs and imaging. I reviewed the immunosuppression medications and levels. I spoke to the patient/family and explained below clinical details and answered all the questions      Transplants:  11/11/2019 (Liver); Postoperative day:  14  ASSESMENT AND PLAN:  1.Graft Function: elevated alk phos  2.Immunosuppression Management: continue cellcept 750mg BID and prograf  2mg BID   3.Hypertension: stable  4.Renal Function: elevated   5.Lab frequency: twice weekly  6.Other: LUANNE:  Pull drain.      Date: November 25, 2019    Transplant:  [x]                             Liver [x]                              Kidney []                             Pancreas []                              Other:             Chief Complaint:  Doing well     History of Present Illness: Frandy Workman is a 54 year old male with a past medical history of cirrhosis secondary to ESTRADA diagnosed in 2013, HCC 2018, HTN, HLD, GERD, BPH, and DM2 who underwent a DBD liver transplant on 11/11/19 with Dr. Ramos.  He returned to OR POD1 for ex lap, hematoma evacuation, and washout.   Patient Active Problem List   Diagnosis     Hepatic cirrhosis (H)     Type II diabetes mellitus (H)     Bipolar affective disorder in remission (H)     Esophageal varices (H)     Nonalcoholic steatohepatitis (ESTRADA)     Brow ptosis     Paralytic lagophthalmos of right upper eyelid     Erectile dysfunction due to diseases classified elsewhere     HCC (hepatocellular carcinoma) (H)     Equivocal stress echocardiogram     Type 2 diabetes mellitus with mild nonproliferative retinopathy of  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 macular edema, unspecified whether long term insulin use (H)     Abnormal findings diagnostic imaging of heart and coronary circulation     Status post coronary angiogram     CAD (coronary artery disease)     Portal vein thrombosis     Liver transplant recipient (H)     Status post liver transplantation (H)     Immunosuppressed status (H)     Anemia due to blood loss, acute     Thrombocytopenia (H)     HTN (hypertension)     Diarrhea     Delirium     Malnutrition related to chronic disease (H)     Hypernatremia     Hypophosphatasia     SOCIAL /FAMILY HISTORY: [x]                  No recent change    Past Medical History:   Diagnosis Date     Anemia 2013    Low blood plates current is 37     Arthritis      BPH (benign prostatic hyperplasia)      CAD (coronary artery disease) 4/1/2019     Cholelithiasis      Conductive hearing loss 8/16/2017    Have a lump on my right side of my face.  Had wax discharge     Depressive disorder 1986    Suffer effects throughout life     Gastroesophageal reflux disease 12/1/2014    Being treated with Prilosac     HCC (hepatocellular carcinoma) (H) 1/22/2019     History of diabetic retinopathy 07/2018     HTN (hypertension)      HTN (hypertension) 11/20/2019     Hyperlipidemia      Liver cirrhosis secondary to ESTRADA (H)      Portal vein thrombosis 4/11/2019     Type II diabetes mellitus (H)     Insulin adminstered BID daily.      Past Surgical History:   Procedure Laterality Date     COLONOSCOPY      2015     CV HEART CATHETERIZATION WITH POSSIBLE INTERVENTION N/A 2/26/2019    Procedure: CORS;  Surgeon: Jagdish Hoyt MD;  Location:  HEART CARDIAC CATH LAB     ESOPHAGOSCOPY, GASTROSCOPY, DUODENOSCOPY (EGD), COMBINED N/A 11/17/2016    Procedure: COMBINED ESOPHAGOSCOPY, GASTROSCOPY, DUODENOSCOPY (EGD);  Surgeon: Santi Rosas MD;  Location:  GI     ESOPHAGOSCOPY, GASTROSCOPY, DUODENOSCOPY (EGD), COMBINED N/A 11/17/2017    Procedure: COMBINED  ESOPHAGOSCOPY, GASTROSCOPY, DUODENOSCOPY (EGD);  EGD;  Surgeon: Santi Rosas MD;  Location: [facility] GI     ESOPHAGOSCOPY, GASTROSCOPY, DUODENOSCOPY (EGD), COMBINED N/A 2018    Procedure: EGD;  Surgeon: Santi Rosas MD;  Location:  OR     HEAD & NECK SURGERY      2017 at [facility].      IMPLANT GOLD WEIGHT EYELID Right 2017    Procedure: IMPLANT WEIGHT EYELID;  Right Upper Eyelid Weight, right tarsal strip lower eyelid;  Surgeon: Milana Malvae MD;  Location: [facility] OR     IR CHEMO EMBOLIZATION  2019     KNEE SURGERY Left      ORTHOPEDIC SURGERY       PAROTIDECTOMY, RADICAL NECK DISSECTION Right 2017    Procedure: PAROTIDECTOMY, RADICAL NECK DISSECTION;  Right Superfacial Parotidectomy , Facial nerve repair. with [facility] facial nerve monitor.;  Surgeon: Asiya Morgan MD;  Location: [facility] OR     PICC INSERTION Left 2017    4fr SL BioFlo PICC, 44cm in the L basilic vein w/ tip in the low SVC     RETURN LIVER TRANSPLANT N/A 2019    Procedure: Exploratory laparotomy, hematoma evacuation, abdominal washout;  Surgeon: Александр Ramos MD;  Location: [facility] OR     TRANSPLANT LIVER RECIPIENT  DONOR N/A 2019    Procedure: TRANSPLANT, LIVER, RECIPIENT,  DONOR;  Surgeon: Александр Ramos MD;  Location: [facility] OR     VASCULAR SURGERY       Social History     Socioeconomic History     Marital status:      Spouse name: Not on file     Number of children: Not on file     Years of education: Not on file     Highest education level: Not on file   Occupational History     Not on file   Social Needs     Financial resource strain: Not on file     Food insecurity:     Worry: Not on file     Inability: Not on file     Transportation needs:     Medical: Not on file     Non-medical: Not on file   Tobacco Use     Smoking status: Never Smoker     Smokeless tobacco: Former User     Types: Chew     Tobacco comment: 1 tin per week   Substance and Sexual Activity      Alcohol use: No     Alcohol/week: 0.0 standard drinks     Comment: quit Sept. 1996     Drug use: No     Sexual activity: Not Currently     Partners: Female     Birth control/protection: Condom   Lifestyle     Physical activity:     Days per week: Not on file     Minutes per session: Not on file     Stress: Not on file   Relationships     Social connections:     Talks on phone: Not on file     Gets together: Not on file     Attends [employer]ism service: Not on file     Active member of club or organization: Not on file     Attends meetings of clubs or organizations: Not on file     Relationship status: Not on file     Intimate partner violence:     Fear of current or ex partner: Not on file     Emotionally abused: Not on file     Physically abused: Not on file     Forced sexual activity: Not on file   Other Topics Concern     Parent/sibling w/ CABG, MI or angioplasty before 65F 55M? Yes   Social History Narrative     Not on file     Prescription Medications as of 11/25/2019       Rx Number Disp Refills Start End Last Dispensed Date Next Fill Date Owning Pharmacy    alpha-lipoic acid 600 MG capsule  90 capsule 3 4/22/2019    [facility] #[id number] - [place], [place] - [address]    Sig: Take 1 capsule (600 mg) by mouth daily    Class: E-Prescribe    Route: Oral    amLODIPine (NORVASC) 10 MG tablet  30 tablet 3 11/21/2019    [place], [place] - [address] [place] [address]    Sig: Take 1 tablet (10 mg) by mouth daily    Class: E-Prescribe    Route: Oral    Artificial Tear Solution (SM ARTIFICIAL TEARS) SOLN  1 Bottle 3 7/2/2018    [id number] - [place], [place] - [address]    Sig: Place 1 drop into the right eye every hour as needed Apply at least 4 times daily and as needed for dry eye    Class: E-Prescribe    Route: Right Eye    aspirin (ASA) 325 MG EC tablet  30 tablet 3 11/21/2019    [facility]  [address]    Sig: Take 1 tablet (325 mg) by mouth daily    Class: E-Prescribe    Route: Oral    BD VIKTORIA U/F 32G X 4 MM insulin pen needle  300 each 3 4/11/2018    [facility] [employer] [id number] - [place], [place] - [address]    Sig: Use 5 per day    Class: E-Prescribe    Renewals     Renewal provider:  Natalie Chun MD          blood glucose (ACCU-CHEK EDINSON PLUS) test strip  400 strip 6 11/22/2019    [id number] - [place]    Sig: USE TO TEST FOUR TIMES DAILY OR AS DIRECTED    Class: E-Prescribe    blood glucose monitoring (ACCU-CHEK EDINSON PLUS) test strip  400 each 3 10/13/2017    [facility] [employer] [facility] #[id number] - [place], [place] - [address]    Sig: Use to test blood sugar 4 times daily    Class: E-Prescribe    blood glucose monitoring (ACCU-CHEK FASTCLIX) lancets  408 each 3 10/13/2017    [facility] [employer] [facility] #[id number] - [place], [place] - [address]    Sig: Use to test blood sugar 4 times daily or as directed.  1 box = 102 lancets    Class: E-Prescribe    carvedilol (COREG) 25 MG tablet  60 tablet 3 11/20/2019    [address]    Sig: Take 1 tablet (25 mg) by mouth 2 times daily (with meals)    Class: E-Prescribe    Route: Oral    Cyanocobalamin 5000 MCG TBDP            Sig: Take 5,000 mcg by mouth daily    Class: Historical    Route: Oral    econazole nitrate 1 % external cream  85 g 0 7/31/2019    [id number] - [place]    Sig: Apply topically daily To feet and toenails.    Class: E-Prescribe    Route: Topical    ferrous sulfate (FEROSUL) 325 (65 Fe) MG tablet  90 tablet 3 9/5/2019    [id number] - [place]    Sig: Take 1 tablet (325  mg) by mouth 3 times daily (with meals)    Class: E-Prescribe    Route: Oral    insulin aspart (NOVOLOG PEN) 100 UNIT/ML pen  3 mL 3 11/20/2019    [facility] [place], [place] - [address]    Sig: Inject 1-7 Units Subcutaneous 3 times daily (with meals) DOSE:  1 units per 5 grams of carbohydrate.  Only chart total amount of units given.  Do not give if pre-prandial glucose is less than 60 mg/dL. If given at mealtime, administer within 30 minutes of start of meal    Class: Local Print    Route: Subcutaneous    insulin aspart (NOVOLOG PEN) 100 UNIT/ML pen    11/20/2019        Sig: Inject 1-7 Units Subcutaneous Take with snacks or supplements for high blood sugar DOSE:  1 units per 5 grams of carbohydrate. Only chart total amount of units given.  Do not give if pre-prandial glucose is less than 60 mg/dL. If given at mealtime, administer within 30 minutes of start of meal    Class: No Print Out    Route: Subcutaneous    insulin aspart (NOVOLOG PEN) 100 UNIT/ML pen  3 mL  11/20/2019        Sig: Inject 1-10 Units Subcutaneous 3 times daily (with meals) Correction Scale - custom DOSING     Do Not give Correction Insulin if Pre-Meal BG less than 140    to 159 give 1 units.    to 179 give 2 units.    to 199 give 3 units.    to 219 give 4 units.    to 239 give 5 units.    to 259 give 6 units.    to 279 give 7 units.    to 299 give 8 units.    to 319 give 9 units.    to 339 give 10 units.   To be given with prandial insulin, and based on pre-meal blood glucose.   Notify provider if glucose greater than or equal 340 mg/dL after administration. If given at mealtime, administer within 30 minutes of start of meal    Class: No Print Out    Route: Subcutaneous    insulin aspart (NOVOLOG PEN) 100 UNIT/ML pen    11/20/2019        Sig: Inject 1-11 Units Subcutaneous At Bedtime Correction Scale - custom DOSING (1:20)   to 219 give 1 units.     to 239 give 2 units.    to 259 give 3 units.    to 279 give 4 units.    to 299 give 5 units.    to 319 give 6 units.    to 339 give 7 units.    to 359 give 8 units    to 379 give 9 units.   to 399 give 10 units.  BG >/=400 give 11 units.  Notify provider if glucose greater than or equal to 400 mg/dL after administration. If given at mealtime, administer within 30 minutes of start of meal    Class: No Print Out    Route: Subcutaneous    insulin degludec (TRESIBA) 200 UNIT/ML pen  100 mL 3 11/20/2019    [address]    Sig: Take 55 units daily.    Class: E-Prescribe    loperamide (IMODIUM) 2 MG capsule  20 capsule 1 11/20/2019    [address]    Sig: Take 1 capsule (2 mg) by mouth 4 times daily as needed for diarrhea    Class: E-Prescribe    Route: Oral    Multiple Vitamin (THERAVITE PO)    3/14/2016        Sig: Take 1 tablet by mouth every morning     Class: Historical    Route: Oral    mycophenolate (GENERIC EQUIVALENT) 250 MG capsule  240 capsule 11 11/20/2019    [address]    Sig: Take 3 capsules (750 mg) by mouth 2 times daily    Class: E-Prescribe    Route: Oral    omeprazole (PRILOSEC) 40 MG DR capsule  90 capsule 2 9/13/2019    [id number] - [place], [place] - [address] [facility]    Sig: Take 1 capsule (40 mg) by mouth daily    Class: E-Prescribe    Route: Oral    ondansetron (ZOFRAN-ODT) 4 MG ODT tab  10 tablet 0 11/20/2019    [address]    Sig: Take 1 tablet (4 mg) by mouth every 6 hours as needed for nausea or vomiting    Class: E-Prescribe    Route: Oral    order for DME  1 each 0 11/19/2019        Sig: Equipment being ordered: Cane ()  Treatment Diagnosis: impaired gait     Class: Local Print    oxyCODONE (ROXICODONE) 5 MG tablet  20 tablet 0 11/20/2019    [address]    Sig: Take 1 tablet (5 mg) by mouth every 6 hours as needed for moderate to severe pain    Class: Local Print    Earliest Fill Date: 11/20/2019    Route: Oral    rosuvastatin (CRESTOR) 5 MG tablet  30 tablet 3 11/20/2019 12/20/2019   [address]    Sig: Take 1 tablet (5 mg) by mouth daily    Class: E-Prescribe    Route: Oral    sulfamethoxazole-trimethoprim (BACTRIM/SEPTRA) 400-80 MG tablet  30 tablet 11 11/21/2019    [address]    Sig: Take 1 tablet by mouth daily    Class: E-Prescribe    Route: Oral    tacrolimus (GENERIC EQUIVALENT) 1 MG capsule  120 capsule 11 11/22/2019    [facility] [place] [address]    Sig: Take 2 capsules (2 mg) by mouth 2 times daily    Class: E-Prescribe    Notes to Pharmacy: TXP DT 11/11/2019 (Liver) TXP Dischg DT 11/20/2019 DX Liver replaced by transplant Z94.4    Route: Oral    tamsulosin (FLOMAX) 0.4 MG capsule  90 capsule 3 10/1/2019    [facility] #[id number] - [place], [address]    Sig: TAKE 1 CAPSULE(0.4 MG) BY MOUTH DAILY    Class: E-Prescribe    tenofovir (VIREAD) 300 MG tablet  30 tablet 11 11/21/2019    [address]    Sig: Take 1 tablet (300 mg) by mouth daily    Class: E-Prescribe    Route: Oral    traZODone (DESYREL) 50 MG tablet  90 tablet 1 11/25/2019    [id number] - [place], [address]    Sig: Take 1 tablet (50 mg) by mouth At Bedtime    Class: E-Prescribe    Route: Oral    valGANciclovir (VALCYTE) 450 MG tablet  30 tablet 5 11/21/2019    [address]    Sig: Take 1 tablet (450 mg) by mouth daily    Class: E-Prescribe    Route: Oral    vitamin D3 (CHOLECALCIFEROL) 2000 units (50 mcg) tablet            Sig: Take 1 tablet by mouth daily    Class: Historical    Route: Oral      Clinic-Administered Medications as of 11/25/2019       Dose Frequency Start End    Aflibercept (EYLEA) injection 2 mg 2 mg EVERY 28 DAYS 3/28/2019 2/27/2020    Admin Instructions: As needed 3-52 weeks<BR>RE    Route: Intravitreal    ranibizumab (LUCENTIS) injection syringe 0.5 mg 0.5 mg EVERY 28 DAYS 3/28/2019 2/27/2020    Admin Instructions: As needed 3-52 weekd<BR>RE    Route: Intravitreal        Codeine and Seasonal allergies   REVIEW OF SYSTEMS (check box if normal)  [x]               GENERAL  [x]                 PULMONARY [x]                GENITOURINARY  [x]                CNS                 [x]                 CARDIAC  [x]                 ENDOCRINE  [x]                EARS,NOSE,THROAT [x]                 GASTROINTESTINAL [x]                 NEUROLOGIC    [x]                MUSCLOSKELTAL  [x]                  HEMATOLOGY      PHYSICAL EXAM (check box if normal)There were no vitals taken for this visit.      [x]            GENERAL:    [x]       EYES:  ICTERIC   []        YES  []                    NO  [x]           EXTREMITIES: Clubbing []       Y     [x]           N    [x]           EARS, NOSE, THROAT: Membranes Moist    YES   [x]                   NO []                  [x]           LUNGS:  CLEAR    YES       [x]                  NO    []                                [x]           SKIN: Jaundice           YES       []                  NO    [x]                   Rash: YES       []                  NO    [x]                                     [x]             HEART: Regular Rate          YES       [x]                  NO    []                   Incision Clean:  YES       [x]                  NO    []                                [x]                    ABDOMEN: Organomegaly YES        []                  NO    [x]                       [x]                    NEUROLOGICAL:  Nonfocal  YES       [x]                  NO    []                       [x]                    Hernia YES       []                  NO    [x]                   PSYCHIATRIC:  Appropriate  YES       [x]                  NO    []                       OTHER:                                                                                                   PAIN SCALE:: 3  HPI      ROS      Physical Exam

## 2019-11-25 NOTE — TELEPHONE ENCOUNTER
Last Clinic Visit: 10/1/2019  [facility]    *re-establishing care with new provider 4/1/2020 Dr Moe.

## 2019-11-25 NOTE — PROGRESS NOTES
Transplant Social Work Services Progress Note      Date of Liver Transplant: 11/11/2019  Collaborated with: Mr. Workman and his friend Davi Saunders    Data: Mr. Workman was seen during his first post transplant clinic visit. He discharged to his home in [place], [place] last Wednesday. He has steps in his home with a railing on one side. His friend Davi has been staying with him, but will be flying back to [place] tonight. They have hired an agency ([phone]) to provide  24 hour care if needed. The person with him most of the time will also be driving him to medical appointments. [facility] is following. The nurse will assist Mr. Workman in setting up his medications and learning them. He was tentative about his medications in clinic today. Physical therapy has also been ordered via home care. It will be helpful for them to see how he navigates steps, and help determine the safest way to do so. [employer] will be delivering his groceries all the way to his kitchen, and unpacking them. They had a trial run of this yesterday that went well. Davi asked when Miller will be able to work out again, as he is trained in martial arts and wants to start doing isometrics with Miller when medically okay. They are aware of his current lifting restrictions. Davi and Miller has some questions related to insurance coverage and what bills might be anticipated.   Intervention: Supportive counseling. Assessment of resources in place to help at home, as his friend will be returning to [place]. Referral to [facility] in transplant finance or [facility] for additional information related to insurance.   Assessment: Mr. Layne is glad to be home and able to sleep in his own bed. His friend Davi has been here to assist him through the transplant process, but will be leaving today. Caregivers have been hired to assist Mr. Workman at home. I have some concern that they have not been educated about post transplant recovery. They will be able to  assist with meal preparation, personal care needs, reminding him to take his medications and getting him to and from medical appointments. Miller and Art were reminded that Medicare Part B is covering 80% of the cost of him immunosuppression meds, with Part D covering his other medications. With his [facility] supplement his immunosuppression should be fully covered, as well as most other medical expenses. Mr. Layne expressed interest in writing to his donor family, but likely not for another six months.   Education provided by : Support group, insurance coverage. Writing to Donor Families packet and verbal education. Social work availability.   Plan:      Follow up Plan: I will update Mr. Workman's , Dilan Pratt, as she will follow for most needs. If she is not available, I can continue to be of assistance.     ALEXYS Zapata, [facility]  [phone]

## 2019-11-25 NOTE — PATIENT INSTRUCTIONS
Recommendations from today's MTM visit:                                                      1. [facility] mail order will call you three weeks post discharge. If you are running low on any medications before then, call the number on the back of the pill box. ([phone])    It was great to speak with you today.  I value your experience and would be very thankful for your time with providing feedback on our clinic survey. You may receive a survey via email or text message in the next few days.     Next MTM visit: 12/2/19     To schedule another MTM appointment, please call the clinic directly or you may call the MTM scheduling line at [phone] or toll-free at [phone].     My Clinical Pharmacist's contact information:                                                      It was a pleasure talking with you today!  Please feel free to contact me with any questions or concerns you have.      Donald Bradley, PharmD  [facility] Pharmacist    Phone: [phone]

## 2019-11-25 NOTE — PROGRESS NOTES
SUBJECTIVE/OBJECTIVE:                Frandy Workman is a 54 year old male coming in for a transitions of care visit.  He was discharged from [facility] on Wednseday for liver txp.     Chief Complaint: Initial post txp med review. Pt was hypoglycemic this morning, so some juice was given to him.     Allergies/ADRs: Reviewed in Epic  Tobacco:  reports that he has never smoked. He quit smokeless tobacco use about 2 years ago.  His smokeless tobacco use included chew.  Alcohol: not currently using  Caffeine: 0-1 cups/day of coffee  PMH: Reviewed in Epic    Medication Adherence/Access:  Patient uses pill box(es). Patient has homecare coming twice weekly to help with meds.   Patient takes medications 3 time(s) per day. 8, 6, 8  Per patient, misses medication 0 times per week.   Medication barriers: none.   The patient fills medications at [place]: YES.  Refills: discussed  Pt seen in [facility] today: yes  Patient seen in hospital by [facility]: yes  Patient receive supplies: yes  Stools: loose until Saturday night., took imodium, this morning had another hard BM.   Reviewed Anti-rejection doses with bottles: yes    Liver Transplant:  Current immunosuppressants include TAC 2mg BID and MMF 750mg BID.  Pt reports no side effects  Transplant date: 11/11/19  Estimated Creatinine Clearance: 50.2 mL/min (A) (based on SCr of 1.81 mg/dL (H)).  CMV prophylaxis: CrCl 40 to 59 mL/minute: Valcyte 450 mg once daily Donor (+), Recipient (-), treat 6 months post tx   PCP prophylaxis: Bactrim S S daily for 6 months post txp  PPI use: Omeprazole 40mg daily  Current supplements for electrolyte replacement: MVI daily, Ferrous suflate 325mg TID.   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   Immunizations: annual flu shot 2019; [id number]:  2015; Prevnar 13: 2019; TDaP:  2015; Shingrix: unknown    Today's Vitals: There were no vitals taken for this visit.    ASSESSMENT:                  Medication Adherence: good, no issues identified    Liver Transplant:  Pt has not recent iron labs, recommend drawing these as he has been taking Iron TID, may contribute to constipation.     PLAN:                Post Discharge Medication Reconciliation Status: discharge medications reconciled, continue medications without change.    Txp team consider...  1. Checking Iron indices. Pt taking Ferrous sulfate TID, want to assure this is necessary.      I spent 30 minutes with this patient today. I offer these suggestions for consideration by txp team. A copy of the visit note was provided to the patient's referring provider.    Will follow up in 1 week to discuss blood sugars.    The patient was given a summary of these recommendations as an after visit summary.    Donald Bradley, PharmD  [facility] Pharmacist    Phone: [phone]

## 2019-11-25 NOTE — PROGRESS NOTES
"Frandy Workman came to [facility] today for a lab and assess following a Liver transplant on 11/11/19.      Discharge date: 11/24/2019  Transplant coordinator: Radha  Phone number patient can be reached at: [phone]      Physical Assessment:  See physical assessment located under \"Document Flowsheets\".  Incision site: C/D/I clam shell, stapled, gave mircoklenz to patient to use daily.  Lines: J/P drain 25 mls daily output. Drain removed by Betty today, steri-stripe applied.  Carroll: N/A  Urine clarity: clear yellow per patient  Hydration: drinking adequate water. Normal diet  Nutrition: good appetite.   Last BM: first normal stool this AM, otherwise diarrhea  Pain: 2/10 incisional pain, has oxycodone if needed, currently not taking anything.     Labs drawn by [facility] staff Yes    Plan of care for today: labs and assessment reviewed by nurse, coordinator and Dr. Chino. Patient received copy of labs.    Medication changes: decreased Norvasc to 5 mg HS.    Medications administered:      Patient education:    The following teaching topics were addressed: Importance of drinking 2L of non-caffeinated fluids daily, Incisional care, Signs/symptoms of infection, Good handwashing, Medications (purposes, doses and times of administration), Phone numbers to call with concenrs (Transplant coordinator, [facility] and [facility]), 7A discharge check list and Plan of care   Patient and Other verbalized understanding and all questions answered.    Drug level:  Tact level will be addressed by coordinator.    Face to face time: 120  Discharge Plan    Pt will follow up with surgery as directed  Discharge instructions reviewed with patient: YES  Patient/Representative verbalized understanding, all questions answered: YES    Discharged from unit at 1230 with whom: friend to home.    Mary Beth Waggoner, RN, RN   "

## 2019-11-25 NOTE — PATIENT INSTRUCTIONS
Dear Frandy Workman    Thank you for choosing [facility] ([facility]) for your transplant cares.  The following information is a summary of our appointment as well as important reminders.      Please make sure your phone is available today because I will call to update you with your anti-rejection drug levels and possibly make changes to your anti-rejection dosages.    We look forward in seeing you on your next appointment here at [facility] ([facility]).  Please don t hesitate to call us at [phone] to reschedule any of your appointments or to speak with one of the [facility] registered nurses.  It was a pleasure taking care of you today.    Sincerely,    [facility]  [facility]  [address] [place] [address]  [id number]  Phone:  [phone]

## 2019-11-26 ENCOUNTER — OFFICE VISIT (OUTPATIENT)
Dept: PHARMACY | Facility: CLINIC | Age: 54
End: 2019-11-26
Payer: COMMERCIAL

## 2019-11-26 ENCOUNTER — OFFICE VISIT (OUTPATIENT)
Dept: INTERNAL MEDICINE | Facility: CLINIC | Age: 54
End: 2019-11-26
Payer: MEDICARE

## 2019-11-26 VITALS
BODY MASS INDEX: 25.5 KG/M2 | WEIGHT: 172.7 LBS | TEMPERATURE: 98.1 F | DIASTOLIC BLOOD PRESSURE: 60 MMHG | OXYGEN SATURATION: 98 % | HEART RATE: 86 BPM | SYSTOLIC BLOOD PRESSURE: 99 MMHG | RESPIRATION RATE: 16 BRPM

## 2019-11-26 DIAGNOSIS — Z79.4 TYPE 2 DIABETES MELLITUS WITH OTHER SPECIFIED COMPLICATION, WITH LONG-TERM CURRENT USE OF INSULIN (H): Primary | ICD-10-CM

## 2019-11-26 DIAGNOSIS — Z94.4 STATUS POST LIVER TRANSPLANTATION (H): ICD-10-CM

## 2019-11-26 DIAGNOSIS — E11.69 TYPE 2 DIABETES MELLITUS WITH OTHER SPECIFIED COMPLICATION, WITH LONG-TERM CURRENT USE OF INSULIN (H): Primary | ICD-10-CM

## 2019-11-26 DIAGNOSIS — E11.65 TYPE 2 DIABETES MELLITUS WITH HYPERGLYCEMIA, WITH LONG-TERM CURRENT USE OF INSULIN (H): ICD-10-CM

## 2019-11-26 DIAGNOSIS — Z79.4 TYPE 2 DIABETES MELLITUS WITH HYPERGLYCEMIA, WITH LONG-TERM CURRENT USE OF INSULIN (H): ICD-10-CM

## 2019-11-26 DIAGNOSIS — E11.3293 TYPE 2 DIABETES MELLITUS WITH MILD NONPROLIFERATIVE RETINOPATHY OF BOTH EYES WITHOUT MACULAR EDEMA, UNSPECIFIED WHETHER LONG TERM INSULIN USE (H): Primary | ICD-10-CM

## 2019-11-26 PROCEDURE — 99605 MTMS BY PHARM NP 15 MIN: CPT | Performed by: PHARMACIST

## 2019-11-26 PROCEDURE — 99607 MTMS BY PHARM ADDL 15 MIN: CPT | Performed by: PHARMACIST

## 2019-11-26 ASSESSMENT — PAIN SCALES - GENERAL: PAINLEVEL: MODERATE PAIN (5)

## 2019-11-26 NOTE — PATIENT INSTRUCTIONS
Primary Care Center Medication Refill Request Information:  * Please contact your pharmacy regarding ANY request for medication refills.  ** [facility] Prescription Fax = [phone]  * Please allow 3 business days for routine medication refills.  * Please allow 5 business days for controlled substance medication refills.     Primary Care Center Test Result notification information:  *You will be notified with in 7-10 days of your appointment day regarding the results of your test.  If you are on MyChart you will be notified as soon as the provider has reviewed the results and signed off on them.      - Please decrease you Tresiba to 36 U per day  - Take a glucose tablet if your blood sugar is less than 70

## 2019-11-26 NOTE — LETTER
Miller Workman   Home Medication Instructions NIDHI:    Printed on:11/27/19 7121   Medication Information     Indication   alpha-lipoic acid 600 MG capsule  Take 1 capsule (600 mg) by mouth daily   Vitamin supplement   amLODIPine (NORVASC) 5 MG tablet  Take 1 tablet (5 mg) by mouth daily   High blood pressure   Artificial Tear Solution (SM ARTIFICIAL TEARS) SOLN  Place 1 drop into the right eye every hour as needed Apply at least 4 times daily and as needed for dry eye   Dry eyes   aspirin (ASA) 325 MG EC tablet  Take 1 tablet (325 mg) by mouth daily   Prevent blood clots   carvedilol (COREG) 25 MG tablet  Take 1 tablet (25 mg) by mouth 2 times daily (with meals)   High blood pressure   Cyanocobalamin 5000 MCG TBDP  Take 5,000 mcg by mouth daily   Low vitamin B12   econazole nitrate 1 % external cream  Apply topically daily To feet and toenails.   Foot fungus   ferrous sulfate (FEROSUL) 325 (65 Fe) MG tablet  Take 1 tablet (325 mg) by mouth 3 times daily (with meals)   Low iron   insulin aspart (NOVOLOG PEN) 100 UNIT/ML pen  Inject 1-7 Units Subcutaneous 3 times daily (with meals) DOSE:  1 units per 5 grams of carbohydrate **HOLD UNTIL YOU SEE RANJANA on 12/18**   Diabetes - HOLD until you see Dr. Wilcox on 12/18   insulin degludec (TRESIBA) 200 UNIT/ML pen  Take 36 units daily.   Diabetes   loperamide (IMODIUM) 2 MG capsule  Take 1 capsule (2 mg) by mouth 4 times daily as needed for diarrhea   Diarrhea    Multiple Vitamin (THERAVITE PO)  Take 1 tablet by mouth every morning    General health   mycophenolate (GENERIC EQUIVALENT) 250 MG capsule  Take 3 capsules (750 mg) by mouth 2 times daily   Liver transplant, prevent rejection   omeprazole (PRILOSEC) 40 MG DR capsule  Take 1 capsule (40 mg) by mouth daily   Liver transplant, protect stomach   ondansetron (ZOFRAN-ODT) 4 MG ODT tab  Take 1 tablet (4 mg) by mouth every 6 hours as needed for nausea or vomiting   Nausea   oxyCODONE (ROXICODONE) 5 MG tablet  Take 1 tablet  (5 mg) by mouth every 6 hours as needed for moderate to severe pain   Pain   rosuvastatin (CRESTOR) 5 MG tablet  Take 1 tablet (5 mg) by mouth daily    High cholesterol, prevent strokes and heart attacks   sulfamethoxazole-trimethoprim (BACTRIM/SEPTRA) 400-80 MG tablet  Take 1 tablet by mouth daily   Prevent bacterial infections after liver transplant   tacrolimus (GENERIC EQUIVALENT) 1 MG capsule  Take 2 capsules (2 mg) by mouth 2 times daily   Liver transplant, prevent rejection   tamsulosin (FLOMAX) 0.4 MG capsule  TAKE 1 CAPSULE(0.4 MG) BY MOUTH DAILY   Prostate, makes it easier to empty bladder   tenofovir (VIREAD) 300 MG tablet  Take 1 tablet (300 mg) by mouth daily   Hepatitis B infection from liver transplant   traZODone (DESYREL) 50 MG tablet  Take 1 tablet (50 mg) by mouth At Bedtime   Insomnia   valGANciclovir (VALCYTE) 450 MG tablet  Take 1 tablet (450 mg) by mouth daily   Prevent viral infections   vitamin D3 (CHOLECALCIFEROL) 2000 units (50 mcg) tablet  Take 1 tablet by mouth daily   Low vitamin D

## 2019-11-26 NOTE — NURSING NOTE
Chief Complaint   Patient presents with     Establish Care     Post liver transplant, patient looking for a primary care provider       Eda Atkinson EMT

## 2019-11-26 NOTE — PROGRESS NOTES
SUBJECTIVE/OBJECTIVE:                           Frandy Workman is a 54 year old male coming in for an initial visit for Medication Therapy Management.  He was referred to me from Maximo Tucker.    Chief Complaint: Hypoglycemia, medication education.    Allergies/ADRs: Reviewed in Epic  Tobacco:  reports that he has never smoked. He quit smokeless tobacco use about 2 years ago.  His smokeless tobacco use included chew.  Alcohol: not currently using  Caffeine: not assessed  Activity: ADLs  PMH: Reviewed in Epic    Medication Adherence/Access:  Services are unclear. He has [facility] home care at this time but also a private agency, as documented in the social work note in SOT on . The patient is fairly certain he will be on his own for taking care of his medications starting on . This is in disagreement with the note by social work. He does not know how to get medication refills. He does not know where his medications are being refill. He does not know what any of his medications are for. We spend the majority of our visit discussing medication access and trying to find out how pharmacy can help him. As a result, we are unable to review his medications in total today.     Diabetes:  Pt currently taking Tresiba 55 units daily and Novolog 1 unit per 5 g carbs + sliding scale insulin. He has historically been managed by Dr. Wilcox in endocrinology. Today, he presents with persistent fasting BG in the 40-60's. He hasn't been hungry since transplant and hasn't been eating much.   SMB-3 times daily.   Ranges (based on glucometer readings):     FP-60's, a few in the low 100s  Pre-lunch: 130s  Patient is experiencing hypoglycemia. Frequency of hypoglycemia? daily. Symptoms of low blood sugar? shaky, dizzy, weak, sweaty.   Recent symptoms of high blood sugar? none  Eye exam: up to date  Foot exam: up to date  ACEi/ARB: No.   Urine Albumin:   Lab Results   Component Value Date    UMALCR 9.21 2019     "  Aspirin: Taking 325mg daily and denies side effects  Diet/Exercise: none specified     Lab Results   Component Value Date    A1C 6.6 08/08/2018    A1C 6.5 06/09/2017    A1C 7.8 10/25/2016     S/p Liver Transplant/HBV: Current medications include tacrolimus 2 mg twice daily, tenofovir 300 mg daily, valganciclovir 450 mg daily, SMTZ/-80 mg daily, omeprazole 40 mg daily,  mg twice daily. He is concerned about missing medication doses today because he thinks he will die with one missed dose. He is also expressing a great deal of concern about home care because he feels his \"life is in someone else's hands\". When asked what would be most helpful for him today, he responds that he would like to know what all of his medications are for and to be able to fill his pill boxes independently if he needed to. Later, he also expresses concerns about his omperazole and that he needs to take it earlier than the rest of his medications. He isn't sure how to do this. It isn't clear that he knows he can take his ondansetron for his symptoms of nausea. He isn't clear about how often he is taking this.     Today's Vitals: There were no vitals taken for this visit.  - taken with PCP today    BP Readings from Last 1 Encounters:   11/26/19 99/60     Pulse Readings from Last 1 Encounters:   11/26/19 86     Wt Readings from Last 1 Encounters:   11/26/19 172 lb 11.2 oz (78.3 kg)     Ht Readings from Last 1 Encounters:   10/28/19 5' 9\" (1.753 m)     Estimated body mass index is 25.5 kg/m  as calculated from the following:    Height as of 10/28/19: 5' 9\" (1.753 m).    Weight as of an earlier encounter on 11/26/19: 172 lb 11.2 oz (78.3 kg).    Temp Readings from Last 1 Encounters:   11/26/19 98.1  F (36.7  C) (Oral)     ASSESSMENT:                          Medication Adherence: not enough information to assess; patient is able to take the medications in his pillbox as directed    Diabetes: Needs Improvement. Patient is meeting A1c " goal of < 7%. Self monitoring of blood glucose is not at goal of fasting  mg/dL. He is likely experiencing hypoglycemia as a result of Tresiba dose too high. Pt would benefit from lower dose of Tresiba and holding Novolog until he can see endo again to improve the safety of his current regimen.    S/p Liver Transplant/HBV: Plan in place with SOT. Patient would benefit from taking his omeprazole as directed and following up with Donald Bradley PharmD as scheduled.     PLAN:                          Post Discharge Medication Reconciliation Status: discharge medications reconciled, continue medications without change.    1. Reduce Tresiba to 36 units daily.  2. HOLD Novolog.   3. Take omeprazole 30 minutes prior to breakfast. Patient is assisted in setting an alarm today.   4. A letter is sent to the patient with a table of all of his medications and their associated indications.     I spent 45 minutes with this patient today. All changes were made via verbal approval with Maximo Tucker and Nerissa Moe. A copy of the visit note was provided to the patient's primary care provider.    Will follow up in 4-5 days to ensure hypoglycemia resolves.    The patient declined a summary of these recommendations as an after visit summary.     Elsa Welsh, Pharm.D., [facility]  Medication Therapy Management Pharmacist  Page/VM:  [phone]

## 2019-11-27 ENCOUNTER — TELEPHONE (OUTPATIENT)
Dept: ENDOCRINOLOGY | Facility: CLINIC | Age: 54
End: 2019-11-27

## 2019-11-27 ENCOUNTER — PATIENT OUTREACH (OUTPATIENT)
Dept: INTERNAL MEDICINE | Facility: CLINIC | Age: 54
End: 2019-11-27

## 2019-11-27 ENCOUNTER — TELEPHONE (OUTPATIENT)
Dept: TRANSPLANT | Facility: CLINIC | Age: 54
End: 2019-11-27

## 2019-11-27 ENCOUNTER — MEDICAL CORRESPONDENCE (OUTPATIENT)
Dept: HEALTH INFORMATION MANAGEMENT | Facility: CLINIC | Age: 54
End: 2019-11-27

## 2019-11-27 LAB
ALBUMIN SERPL-MCNC: 3 G/DL (ref 3.4–5)
ALP SERPL-CCNC: 214 U/L (ref 40–150)
ALT SERPL W P-5'-P-CCNC: 33 U/L (ref 0–70)
ANION GAP SERPL CALCULATED.3IONS-SCNC: 7 MMOL/L (ref 3–14)
AST SERPL W P-5'-P-CCNC: 24 U/L (ref 0–45)
BILIRUB DIRECT SERPL-MCNC: 0.2 MG/DL (ref 0–0.2)
BILIRUB SERPL-MCNC: 0.7 MG/DL (ref 0.2–1.3)
BUN SERPL-MCNC: 47 MG/DL (ref 7–30)
CALCIUM SERPL-MCNC: 9.9 MG/DL (ref 8.5–10.1)
CHLORIDE SERPL-SCNC: 110 MMOL/L (ref 94–109)
CO2 SERPL-SCNC: 18 MMOL/L (ref 20–32)
CREAT SERPL-MCNC: 1.88 MG/DL (ref 0.66–1.25)
ERYTHROCYTE [DISTWIDTH] IN BLOOD BY AUTOMATED COUNT: 19.5 % (ref 10–15)
GFR SERPL CREATININE-BSD FRML MDRD: 39 ML/MIN/{1.73_M2}
GLUCOSE SERPL-MCNC: 51 MG/DL (ref 70–99)
HCT VFR BLD AUTO: 29.2 % (ref 40–53)
HGB BLD-MCNC: 9.6 G/DL (ref 13.3–17.7)
MAGNESIUM SERPL-MCNC: 2.2 MG/DL (ref 1.6–2.3)
MCH RBC QN AUTO: 31.3 PG (ref 26.5–33)
MCHC RBC AUTO-ENTMCNC: 32.9 G/DL (ref 31.5–36.5)
MCV RBC AUTO: 95 FL (ref 78–100)
PHOSPHATE SERPL-MCNC: 4 MG/DL (ref 2.5–4.5)
PLATELET # BLD AUTO: 222 10E9/L (ref 150–450)
POTASSIUM SERPL-SCNC: 5.1 MMOL/L (ref 3.4–5.3)
PROT SERPL-MCNC: 7.7 G/DL (ref 6.8–8.8)
RBC # BLD AUTO: 3.07 10E12/L (ref 4.4–5.9)
SODIUM SERPL-SCNC: 135 MMOL/L (ref 133–144)
TACROLIMUS BLD-MCNC: 9.1 UG/L (ref 5–15)
TME LAST DOSE: NORMAL H
WBC # BLD AUTO: 7.6 10E9/L (ref 4–11)

## 2019-11-27 PROCEDURE — 83735 ASSAY OF MAGNESIUM: CPT | Performed by: SURGERY

## 2019-11-27 PROCEDURE — 80048 BASIC METABOLIC PNL TOTAL CA: CPT | Performed by: SURGERY

## 2019-11-27 PROCEDURE — 85027 COMPLETE CBC AUTOMATED: CPT | Performed by: SURGERY

## 2019-11-27 PROCEDURE — 84100 ASSAY OF PHOSPHORUS: CPT | Performed by: SURGERY

## 2019-11-27 PROCEDURE — 80197 ASSAY OF TACROLIMUS: CPT | Performed by: SURGERY

## 2019-11-27 PROCEDURE — 80076 HEPATIC FUNCTION PANEL: CPT | Performed by: SURGERY

## 2019-11-27 NOTE — PROGRESS NOTES
Hi Ms. Kulkarni,   I had the pleasure of seeing Mr Workman today in primary care clinic and it looks like you were able to have a conversation with him yesterday. As you know his friend Davi recently headed back to [place] and today Mr Workman voiced his concerns about his ability to care for himself alone in his house. I saw that he had been connected with home cares, but he told me that he would be losing some of these services in the next week or so. He has also been persistently hypoglycemic without any sliding scale insulin use since discharge and I will be reducing his insulin dose in half. It seems like he has poor appetite with poor intake and may require additional nutritional supports in the future.     I have worries that he will be able to adequately care for himself at home and think that he may require the 24 hour care service that you had mentioned in your note. I'm unsure who to contact about arranging this but will reach out to our nurse as well to help try and coordinate resources for him.   Thanks for your message and concerns. They are some of the same ones we have had. I am copying this to Dilan Pratt, who Mr. Workman's usual transplant  , and very aware of his situation. She will let me know if I have additional follow up to do on Friday.     Left message for pt to call back.  Dennise Augustin RN 8:42 AM on 12/31/2019.        Met with pt in clinic today to follow up in PCP following a liver transplant.  assigned to pt has set up nurses to come to pt's home to set up medication and to help with ADL's. Pt's friend Davi left for [place] for 4 weeks. Pt states he is getting stronger everyday. He welcomes the help from others. Clinic numbers given for questions or concerns.  Dennise Augustin RN 6:13 AM on 11/27/2019.

## 2019-11-27 NOTE — TELEPHONE ENCOUNTER
symptom clarification      Nerissa Moe MD  You; Jennifer Wilcox MD; Wendy Donahue NP 5 minutes ago (3:34 PM)      Primary care is not taking over diabetes care.  SB    Routing comment

## 2019-11-27 NOTE — TELEPHONE ENCOUNTER
Chichi  Home Care Nurse at Pts home to do Transplant labs this [phone]  At House this AM waking BS was 65  Has not taken ordered insulin; after 1 hour, after eating sandwich BS was 76  When nurse arrived PT was Anxious and Sweaty, was fasting for labs this am although he did not need to, when she left Pt was alert and oriented and comfortable, states Therapist coming to see Pt at 10:00am this morning.     Spoke w/ Pt: BS is now 80. Pt states he has peanut butter and crackers to snack on and glucose pills when he feels low. Confirms understanding of when to check BS, when to take glucose tabs, and when to be seen in ER.     Provider notified.   Abby Cespedes RN on 2019 at 9:59 AM

## 2019-11-27 NOTE — TELEPHONE ENCOUNTER
Reduce Tresiba to 15 units daily. No tresiba today.    Carb ratio 1 unit for every 10 gm carb.   Correction 1 unit per 40 over BG of 140.   Monitor BG premeal, and bedtime.   Hypoglycemia management and report if BG continues to be low.   Jennifer Wilcox MD  Endocrinology Service

## 2019-11-27 NOTE — TELEPHONE ENCOUNTER
[facility] Call Center    Phone Message    May a detailed message be left on voicemail: yes    Reason for Call: Other: Crystal with transplant clinic requesting a call back from nurse Mora. Please advise.     Action Taken: Message routed to:  [facility] ([facility]): Endocrinology

## 2019-11-27 NOTE — TELEPHONE ENCOUNTER
Spoke w/ Radha at Transplant. States Pt is overwhelmed as his caregiver has left.     Seeing new clinic provider (Lanie Atkinson) on Monday.     Spoke w/ Pt: Confirms understanding that he has appt with Lanie Atkinson on Monday 12/02/2019. States he would like Dr Wilcox to manage his diabetes and insulin. Will keep appt with Dr Wilcox on 12/18/2019. States he is going low because he does not want to eat. Clarified the importance of eating even if he does not have appetite. Confirms understanding of meals and snacks.   Providers notified.   Abby Cespedes RN on 11/27/2019 at 12:44 PM

## 2019-11-27 NOTE — TELEPHONE ENCOUNTER
Received call from pt's homecare RNChichi. Pt not feeling well. Pt's sweating, clammy, headache, nausea. Requested patient to check his blood sugar it is 65. Patient is going to take zofran and eat something this morning.   Attempted to reach endocrine clinic, but received a voicemail for the nurse line. Requested the patient hold his insulin this morning until endocrine returns call. Patient will eat something and see how he is feeling.   Left message for endocrine RN to return call to Chichi's call to see what patient should do with his insulin.     0945am. Chichi returned call that she still has not heard from Endocrine clinic. Attempted to reach endocrine clinic, but RN unavailable and endocrine will request RN call Chichi directly. Pt's blood sugar 76 currently and he is starting to feel better. His nausea is better.

## 2019-11-27 NOTE — TELEPHONE ENCOUNTER
"Chichi  Home Care Nurse:  [phone]  Left message for Transplant Coordinator Crystal: 3 different insulin orders from 3 different providers. Please clarify.       Spoke w/ Pt: ASked Pt to clarify why he saw Dr Tucker for insulin/diabetes management 11/26/2019 and contacted Dr Wilcox this AM for clarification on low blood sugar. States he is not going to see Dr Wilcox until 12/18/2019, and then Dr Wilcox is leaving clinic. States he saw Dr Tucker yesterday, who \"took it over\". Asked Pt why he did not provide this information this AM states he is unsure of who should be providing diabetes care. Asked PT to please contact Dr Tucker (number provided) and determine whom (one provider) he wishes to provide diabetes management. Transplant nurse notified. Home Care nurse notified. Dr Moe notified (for Dr Tucker). Dr Wilcox notified. Wendy Donahue, SUSHANT notified.   Abby Cespedes RN on 11/27/2019 at 12:16 PM             RE:   Reduce Tresiba to 15 units daily. No tresiba today.    Carb ratio 1 unit for every 10 gm carb.   Correction 1 unit per 40 over BG of 140.   Monitor BG premeal, and bedtime.   Hypoglycemia management and report if BG continues to be low.   Jennifer Wilcox MD  Endocrinology Service  "

## 2019-11-27 NOTE — TELEPHONE ENCOUNTER
Please ask Miller to follow up my recommendation to avoid low sugars.   He can see Lanie sooner to further manage his sugars.   I think it was a good step to follow-up with PCP in the interim and getting recs. We would have advised similarly as they did.   We will continue to watch sugars and make adjustments.   Jennifer Wilcox MD  Endocrinology Service

## 2019-11-29 ENCOUNTER — ALLIED HEALTH/NURSE VISIT (OUTPATIENT)
Dept: PHARMACY | Facility: CLINIC | Age: 54
End: 2019-11-29
Payer: COMMERCIAL

## 2019-11-29 DIAGNOSIS — E11.3293 TYPE 2 DIABETES MELLITUS WITH MILD NONPROLIFERATIVE RETINOPATHY OF BOTH EYES WITHOUT MACULAR EDEMA, UNSPECIFIED WHETHER LONG TERM INSULIN USE (H): Primary | ICD-10-CM

## 2019-11-29 PROCEDURE — 99606 MTMS BY PHARM EST 15 MIN: CPT | Performed by: PHARMACIST

## 2019-11-29 NOTE — Clinical Note
Manny Bennett - hypoglycemia is improving with Miller. Let me know if I can help going forward and if follow-up with me is a good idea.

## 2019-11-29 NOTE — PROGRESS NOTES
"SUBJECTIVE/OBJECTIVE:                Frandy Workman is a 54 year old male called for a follow-up visit for Medication Therapy Management.  He was referred to me from Maximo Tucker. When I call, Miller tells me that he is having problems with his phone and he is at [employer] getting it replaced. He doesn't have much time to talk as a result.     Chief Complaint: Follow up from MTM visit on 19.  We are following up on hypoglycemia    Tobacco:  reports that he has never smoked. He quit smokeless tobacco use about 2 years ago.  His smokeless tobacco use included chew.  Alcohol: not currently using    Medication Adherence/Access:  no issues reported    Diabetes:  Pt currently taking Tresiba 15 units daily and Novolog 1 unit per 40 g of CHO (per Dr. Wilcox's recommendations). Pt is not experiencing side effects.We review that he will see endocrinology on Monday and they will manage his insulin ongoing.   SMB-3 times daily.   Ranges (patient reported): Lows are better, per patient report    Recent symptoms of high blood sugar? none  Eye exam: up to date  Foot exam: up to date  ACEi/ARB: No.   Urine Albumin:   Lab Results   Component Value Date    UMALCR 9.21 2019      Aspirin: Taking 325mg daily and denies side effects  Diet/Exercise: he is still struggling with appetite but is doing his best to follow endo's directions.     Today's Vitals: There were no vitals taken for this visit. - telemed     BP Readings from Last 1 Encounters:   19 99/60     Pulse Readings from Last 1 Encounters:   19 86     Wt Readings from Last 1 Encounters:   19 172 lb 11.2 oz (78.3 kg)     Ht Readings from Last 1 Encounters:   10/28/19 5' 9\" (1.753 m)     Estimated body mass index is 25.5 kg/m  as calculated from the following:    Height as of 10/28/19: 5' 9\" (1.753 m).    Weight as of 19: 172 lb 11.2 oz (78.3 kg).    Temp Readings from Last 1 Encounters:   19 98.1  F (36.7  C) (Oral)     ASSESSMENT:         "          Medication Adherence: good, no issues identified    Diabetes: Improved. Hypoglycemia is improving with dose adjustment in insulin. He would benefit from following up with endocrinology as directed.      PLAN:                  No recommendations    I spent 15 minutes with this patient today. A copy of the visit note was provided to the patient's primary care provider.     Will follow up in 2-4 weeks after he sees Donald Bradley.    The patient declined a summary of these recommendations as an after visit summary.    Elsa Welsh, Pharm.D., [facility]  Medication Therapy Management Pharmacist  Page/VM:  [phone]

## 2019-11-30 ASSESSMENT — ENCOUNTER SYMPTOMS
NIGHT SWEATS: 0
DYSURIA: 0
LOSS OF CONSCIOUSNESS: 0
VOMITING: 1
POLYPHAGIA: 0
TREMORS: 0
FLANK PAIN: 0
FATIGUE: 1
ABDOMINAL PAIN: 1
SINUS CONGESTION: 1
JAUNDICE: 0
PANIC: 1
TROUBLE SWALLOWING: 0
SKIN CHANGES: 0
COUGH: 1
EYE IRRITATION: 1
DECREASED CONCENTRATION: 1
WEIGHT LOSS: 1
ALTERED TEMPERATURE REGULATION: 1
NERVOUS/ANXIOUS: 1
INCREASED ENERGY: 1
DIZZINESS: 0
HEMOPTYSIS: 0
DECREASED APPETITE: 1
JOINT SWELLING: 0
EYE PAIN: 0
SINUS PAIN: 0
NAIL CHANGES: 0
CHILLS: 1
BACK PAIN: 1
HOARSE VOICE: 0
HALLUCINATIONS: 1
WEIGHT GAIN: 0
TINGLING: 1
SPEECH CHANGE: 0
DOUBLE VISION: 0
NAUSEA: 1
DIARRHEA: 1
DIFFICULTY URINATING: 0
MUSCLE CRAMPS: 0
RECTAL PAIN: 0
NECK PAIN: 1
SNORES LOUDLY: 0
ARTHRALGIAS: 0
BRUISES/BLEEDS EASILY: 0
POLYDIPSIA: 1
TASTE DISTURBANCE: 1
DISTURBANCES IN COORDINATION: 0
WEAKNESS: 1
SORE THROAT: 0
HEARTBURN: 0
NUMBNESS: 0
DEPRESSION: 1
MYALGIAS: 1
PARALYSIS: 0
NECK MASS: 0
HEADACHES: 0
FEVER: 0
SHORTNESS OF BREATH: 1
WHEEZING: 0
INSOMNIA: 1
BLOOD IN STOOL: 0
DYSPNEA ON EXERTION: 1
SMELL DISTURBANCE: 1
EYE REDNESS: 0
SWOLLEN GLANDS: 0
COUGH DISTURBING SLEEP: 1
STIFFNESS: 0
MEMORY LOSS: 1
BLOATING: 0
SEIZURES: 0
MUSCLE WEAKNESS: 1
POOR WOUND HEALING: 0
HEMATURIA: 0
CONSTIPATION: 0
BOWEL INCONTINENCE: 1
SPUTUM PRODUCTION: 0
EYE WATERING: 0

## 2019-12-01 ENCOUNTER — TELEPHONE (OUTPATIENT)
Dept: TRANSPLANT | Facility: CLINIC | Age: 54
End: 2019-12-01

## 2019-12-01 DIAGNOSIS — Z94.4 STATUS POST LIVER TRANSPLANTATION (H): Primary | ICD-10-CM

## 2019-12-01 RX ORDER — MYCOPHENOLIC ACID 180 MG/1
540 TABLET, DELAYED RELEASE ORAL EVERY 12 HOURS
Qty: 180 TABLET | Refills: 3 | Status: ON HOLD | OUTPATIENT
Start: 2019-12-01 | End: 2019-12-17

## 2019-12-01 RX ORDER — MYCOPHENOLIC ACID 180 MG/1
540 TABLET, DELAYED RELEASE ORAL EVERY 12 HOURS
Qty: 42 TABLET | Refills: 0 | Status: SHIPPED | OUTPATIENT
Start: 2019-12-01 | End: 2019-12-01

## 2019-12-01 NOTE — TELEPHONE ENCOUNTER
Patient is feeling nauseated and seems to be getting worse. Most likely related to cellcept. Requested Miller to take zofran.   Message to dr Gongora.   Per DR Gongora patient to change to myfortic 540 mg every 12 hours. Patient to follow up with Dr Ramos on 12/2.   Spoke with patient and he was super nervous about being so nauseated. He preferred to  medication tonight. Contacted his local [facility] and they did not carry. Contacted 2 other pharmacies and [facility] had a supply to give him a weeks worth. Contacted patient to discuss plan. Patient became too anxious that it was not at his regular [facility]. Gabrielle, caretaker, told him it was ok and she was willing to drive him there and he would not calm down so order was canceled.   Patient was given plan and he wrote it down.   12/2: stop at lab, stop at pharmacy and then attend his appointments and dr ramos appt was added at 245 pm.

## 2019-12-02 ENCOUNTER — OFFICE VISIT (OUTPATIENT)
Dept: ENDOCRINOLOGY | Facility: CLINIC | Age: 54
End: 2019-12-02
Payer: MEDICARE

## 2019-12-02 ENCOUNTER — OFFICE VISIT (OUTPATIENT)
Dept: PHARMACY | Facility: CLINIC | Age: 54
End: 2019-12-02
Payer: COMMERCIAL

## 2019-12-02 ENCOUNTER — TELEPHONE (OUTPATIENT)
Dept: TRANSPLANT | Facility: CLINIC | Age: 54
End: 2019-12-02

## 2019-12-02 ENCOUNTER — OFFICE VISIT (OUTPATIENT)
Dept: NEPHROLOGY | Facility: CLINIC | Age: 54
DRG: 682 | End: 2019-12-02
Attending: INTERNAL MEDICINE
Payer: MEDICARE

## 2019-12-02 ENCOUNTER — HOSPITAL ENCOUNTER (INPATIENT)
Facility: CLINIC | Age: 54
LOS: 8 days | Discharge: NURSING, REHAB OR LONG-TERM CARE | DRG: 682 | End: 2019-12-10
Attending: TRANSPLANT SURGERY | Admitting: TRANSPLANT SURGERY
Payer: MEDICARE

## 2019-12-02 VITALS
OXYGEN SATURATION: 100 % | TEMPERATURE: 97.6 F | DIASTOLIC BLOOD PRESSURE: 66 MMHG | BODY MASS INDEX: 24.4 KG/M2 | HEART RATE: 91 BPM | WEIGHT: 165.2 LBS | SYSTOLIC BLOOD PRESSURE: 102 MMHG

## 2019-12-02 VITALS
WEIGHT: 165 LBS | HEART RATE: 91 BPM | HEIGHT: 69 IN | DIASTOLIC BLOOD PRESSURE: 66 MMHG | SYSTOLIC BLOOD PRESSURE: 102 MMHG | BODY MASS INDEX: 24.44 KG/M2

## 2019-12-02 DIAGNOSIS — C22.0 HCC (HEPATOCELLULAR CARCINOMA) (H): ICD-10-CM

## 2019-12-02 DIAGNOSIS — R19.7 DIARRHEA, UNSPECIFIED TYPE: ICD-10-CM

## 2019-12-02 DIAGNOSIS — K74.60 CIRRHOSIS OF LIVER WITH ASCITES, UNSPECIFIED HEPATIC CIRRHOSIS TYPE (H): ICD-10-CM

## 2019-12-02 DIAGNOSIS — Z94.4 LIVER REPLACED BY TRANSPLANT (H): ICD-10-CM

## 2019-12-02 DIAGNOSIS — E83.39 HYPOPHOSPHATASIA: ICD-10-CM

## 2019-12-02 DIAGNOSIS — Z79.4 TYPE 2 DIABETES MELLITUS WITH HYPERGLYCEMIA, WITH LONG-TERM CURRENT USE OF INSULIN (H): Primary | ICD-10-CM

## 2019-12-02 DIAGNOSIS — Z94.4 STATUS POST LIVER TRANSPLANTATION (H): Primary | ICD-10-CM

## 2019-12-02 DIAGNOSIS — Z13.220 LIPID SCREENING: ICD-10-CM

## 2019-12-02 DIAGNOSIS — E11.65 TYPE 2 DIABETES MELLITUS WITH HYPERGLYCEMIA, WITH LONG-TERM CURRENT USE OF INSULIN (H): Primary | ICD-10-CM

## 2019-12-02 DIAGNOSIS — N18.2 CKD (CHRONIC KIDNEY DISEASE) STAGE 2, GFR 60-89 ML/MIN: ICD-10-CM

## 2019-12-02 DIAGNOSIS — Z76.82 AWAITING LIVER TRANSPLANT: ICD-10-CM

## 2019-12-02 DIAGNOSIS — Z94.4 LIVER TRANSPLANT RECIPIENT (H): ICD-10-CM

## 2019-12-02 DIAGNOSIS — N17.9 AKI (ACUTE KIDNEY INJURY) (H): ICD-10-CM

## 2019-12-02 DIAGNOSIS — R18.8 CIRRHOSIS OF LIVER WITH ASCITES, UNSPECIFIED HEPATIC CIRRHOSIS TYPE (H): ICD-10-CM

## 2019-12-02 DIAGNOSIS — N17.9 AKI (ACUTE KIDNEY INJURY) (H): Primary | ICD-10-CM

## 2019-12-02 DIAGNOSIS — Z94.4 LIVER TRANSPLANT RECIPIENT (H): Primary | ICD-10-CM

## 2019-12-02 LAB
AFP SERPL-MCNC: 2 UG/L (ref 0–8)
ALBUMIN SERPL-MCNC: 3 G/DL (ref 3.4–5)
ALP SERPL-CCNC: 185 U/L (ref 40–150)
ALT SERPL W P-5'-P-CCNC: 26 U/L (ref 0–70)
ANION GAP SERPL CALCULATED.3IONS-SCNC: 11 MMOL/L (ref 3–14)
AST SERPL W P-5'-P-CCNC: 16 U/L (ref 0–45)
BILIRUB DIRECT SERPL-MCNC: 0.2 MG/DL (ref 0–0.2)
BILIRUB SERPL-MCNC: 0.5 MG/DL (ref 0.2–1.3)
BUN SERPL-MCNC: 86 MG/DL (ref 7–30)
C DIFF TOX B STL QL: NEGATIVE
CALCIUM SERPL-MCNC: 9.4 MG/DL (ref 8.5–10.1)
CHLORIDE SERPL-SCNC: 106 MMOL/L (ref 94–109)
CO2 SERPL-SCNC: 14 MMOL/L (ref 20–32)
CREAT SERPL-MCNC: 3.17 MG/DL (ref 0.66–1.25)
CREAT UR-MCNC: 58 MG/DL
ENTERIC PATHOGEN COMMENT: NORMAL
ERYTHROCYTE [DISTWIDTH] IN BLOOD BY AUTOMATED COUNT: 19.9 % (ref 10–15)
FERRITIN SERPL-MCNC: 1353 NG/ML (ref 26–388)
GFR SERPL CREATININE-BSD FRML MDRD: 21 ML/MIN/{1.73_M2}
GLUCOSE BLDC GLUCOMTR-MCNC: 117 MG/DL (ref 70–99)
GLUCOSE BLDC GLUCOMTR-MCNC: 143 MG/DL (ref 70–99)
GLUCOSE BLDC GLUCOMTR-MCNC: 149 MG/DL (ref 70–99)
GLUCOSE SERPL-MCNC: 194 MG/DL (ref 70–99)
HBA1C MFR BLD: 5.1 % (ref 4.3–6)
HCT VFR BLD AUTO: 23.4 % (ref 40–53)
HGB BLD-MCNC: 7.6 G/DL (ref 13.3–17.7)
IRON SATN MFR SERPL: 36 % (ref 15–46)
IRON SERPL-MCNC: 88 UG/DL (ref 35–180)
LACTOFERRIN STL QL IA: POSITIVE
MAGNESIUM SERPL-MCNC: 2.4 MG/DL (ref 1.6–2.3)
MCH RBC QN AUTO: 31.5 PG (ref 26.5–33)
MCHC RBC AUTO-ENTMCNC: 32.5 G/DL (ref 31.5–36.5)
MCV RBC AUTO: 97 FL (ref 78–100)
MICROALBUMIN UR-MCNC: 60 MG/L
MICROALBUMIN/CREAT UR: 102.4 MG/G CR (ref 0–17)
PHOSPHATE SERPL-MCNC: 4.7 MG/DL (ref 2.5–4.5)
PLATELET # BLD AUTO: 164 10E9/L (ref 150–450)
POTASSIUM SERPL-SCNC: 5.5 MMOL/L (ref 3.4–5.3)
POTASSIUM SERPL-SCNC: 5.5 MMOL/L (ref 3.4–5.3)
PROT SERPL-MCNC: 7.5 G/DL (ref 6.8–8.8)
PROT UR-MCNC: 0.71 G/L
PROT/CREAT 24H UR: 1.22 G/G CR (ref 0–0.2)
RBC # BLD AUTO: 2.41 10E12/L (ref 4.4–5.9)
RVA NSP5 STL QL NAA+PROBE: NORMAL
SODIUM SERPL-SCNC: 131 MMOL/L (ref 133–144)
SPECIMEN SOURCE: NORMAL
TACROLIMUS BLD-MCNC: 10.1 UG/L (ref 5–15)
TIBC SERPL-MCNC: 248 UG/DL (ref 240–430)
TME LAST DOSE: NORMAL H
WBC # BLD AUTO: 6.7 10E9/L (ref 4–11)

## 2019-12-02 PROCEDURE — 25000131 ZZH RX MED GY IP 250 OP 636 PS 637: Mod: GY | Performed by: PHYSICIAN ASSISTANT

## 2019-12-02 PROCEDURE — 84132 ASSAY OF SERUM POTASSIUM: CPT | Performed by: PHYSICIAN ASSISTANT

## 2019-12-02 PROCEDURE — 80197 ASSAY OF TACROLIMUS: CPT | Performed by: SURGERY

## 2019-12-02 PROCEDURE — 82043 UR ALBUMIN QUANTITATIVE: CPT | Performed by: INTERNAL MEDICINE

## 2019-12-02 PROCEDURE — 36415 COLL VENOUS BLD VENIPUNCTURE: CPT | Performed by: PHYSICIAN ASSISTANT

## 2019-12-02 PROCEDURE — 82248 BILIRUBIN DIRECT: CPT | Performed by: INTERNAL MEDICINE

## 2019-12-02 PROCEDURE — 84075 ASSAY ALKALINE PHOSPHATASE: CPT | Performed by: INTERNAL MEDICINE

## 2019-12-02 PROCEDURE — 82247 BILIRUBIN TOTAL: CPT | Performed by: INTERNAL MEDICINE

## 2019-12-02 PROCEDURE — 25800030 ZZH RX IP 258 OP 636: Performed by: PHYSICIAN ASSISTANT

## 2019-12-02 PROCEDURE — 84155 ASSAY OF PROTEIN SERUM: CPT | Performed by: INTERNAL MEDICINE

## 2019-12-02 PROCEDURE — 84460 ALANINE AMINO (ALT) (SGPT): CPT | Performed by: INTERNAL MEDICINE

## 2019-12-02 PROCEDURE — 87449 NOS EACH ORGANISM AG IA: CPT | Performed by: PHYSICIAN ASSISTANT

## 2019-12-02 PROCEDURE — G0463 HOSPITAL OUTPT CLINIC VISIT: HCPCS | Mod: ZF

## 2019-12-02 PROCEDURE — 25000128 H RX IP 250 OP 636: Performed by: PHYSICIAN ASSISTANT

## 2019-12-02 PROCEDURE — 80069 RENAL FUNCTION PANEL: CPT | Performed by: INTERNAL MEDICINE

## 2019-12-02 PROCEDURE — 83630 LACTOFERRIN FECAL (QUAL): CPT | Performed by: PHYSICIAN ASSISTANT

## 2019-12-02 PROCEDURE — 12000026 ZZH R&B TRANSPLANT

## 2019-12-02 PROCEDURE — 84450 TRANSFERASE (AST) (SGOT): CPT | Performed by: INTERNAL MEDICINE

## 2019-12-02 PROCEDURE — 99607 MTMS BY PHARM ADDL 15 MIN: CPT | Performed by: PHARMACIST

## 2019-12-02 PROCEDURE — 82105 ALPHA-FETOPROTEIN SERUM: CPT | Performed by: INTERNAL MEDICINE

## 2019-12-02 PROCEDURE — 87506 IADNA-DNA/RNA PROBE TQ 6-11: CPT | Performed by: PHYSICIAN ASSISTANT

## 2019-12-02 PROCEDURE — 85027 COMPLETE CBC AUTOMATED: CPT | Performed by: INTERNAL MEDICINE

## 2019-12-02 PROCEDURE — 40000141 ZZH STATISTIC PERIPHERAL IV START W/O US GUIDANCE

## 2019-12-02 PROCEDURE — 87493 C DIFF AMPLIFIED PROBE: CPT | Performed by: PHYSICIAN ASSISTANT

## 2019-12-02 PROCEDURE — 00000146 ZZHCL STATISTIC GLUCOSE BY METER IP

## 2019-12-02 PROCEDURE — 84156 ASSAY OF PROTEIN URINE: CPT | Performed by: INTERNAL MEDICINE

## 2019-12-02 PROCEDURE — 99606 MTMS BY PHARM EST 15 MIN: CPT | Performed by: PHARMACIST

## 2019-12-02 PROCEDURE — 83735 ASSAY OF MAGNESIUM: CPT | Performed by: INTERNAL MEDICINE

## 2019-12-02 PROCEDURE — 83540 ASSAY OF IRON: CPT | Performed by: INTERNAL MEDICINE

## 2019-12-02 PROCEDURE — 83550 IRON BINDING TEST: CPT | Performed by: INTERNAL MEDICINE

## 2019-12-02 PROCEDURE — 36415 COLL VENOUS BLD VENIPUNCTURE: CPT | Performed by: INTERNAL MEDICINE

## 2019-12-02 PROCEDURE — 25000132 ZZH RX MED GY IP 250 OP 250 PS 637: Mod: GY | Performed by: PHYSICIAN ASSISTANT

## 2019-12-02 PROCEDURE — 82728 ASSAY OF FERRITIN: CPT | Performed by: INTERNAL MEDICINE

## 2019-12-02 RX ORDER — TAMSULOSIN HYDROCHLORIDE 0.4 MG/1
0.4 CAPSULE ORAL DAILY
Status: DISCONTINUED | OUTPATIENT
Start: 2019-12-02 | End: 2019-12-10 | Stop reason: HOSPADM

## 2019-12-02 RX ORDER — SULFAMETHOXAZOLE AND TRIMETHOPRIM 400; 80 MG/1; MG/1
1 TABLET ORAL DAILY
Status: DISCONTINUED | OUTPATIENT
Start: 2019-12-02 | End: 2019-12-03

## 2019-12-02 RX ORDER — LANOLIN ALCOHOL/MO/W.PET/CERES
5000 CREAM (GRAM) TOPICAL DAILY
Status: DISCONTINUED | OUTPATIENT
Start: 2019-12-02 | End: 2019-12-03

## 2019-12-02 RX ORDER — ROSUVASTATIN CALCIUM 5 MG/1
5 TABLET, COATED ORAL DAILY
Status: DISCONTINUED | OUTPATIENT
Start: 2019-12-02 | End: 2019-12-10 | Stop reason: HOSPADM

## 2019-12-02 RX ORDER — LIDOCAINE 40 MG/G
CREAM TOPICAL
Status: DISCONTINUED | OUTPATIENT
Start: 2019-12-02 | End: 2019-12-10 | Stop reason: HOSPADM

## 2019-12-02 RX ORDER — TENOFOVIR DISOPROXIL FUMARATE 300 MG/1
300 TABLET, FILM COATED ORAL DAILY
Status: DISCONTINUED | OUTPATIENT
Start: 2019-12-03 | End: 2019-12-03

## 2019-12-02 RX ORDER — DEXTROSE MONOHYDRATE 25 G/50ML
25-50 INJECTION, SOLUTION INTRAVENOUS
Status: DISCONTINUED | OUTPATIENT
Start: 2019-12-02 | End: 2019-12-10 | Stop reason: HOSPADM

## 2019-12-02 RX ORDER — TACROLIMUS 1 MG/1
2 CAPSULE ORAL 2 TIMES DAILY
Status: DISCONTINUED | OUTPATIENT
Start: 2019-12-02 | End: 2019-12-03

## 2019-12-02 RX ORDER — CARVEDILOL 25 MG/1
12.5 TABLET ORAL 2 TIMES DAILY WITH MEALS
Qty: 60 TABLET | Refills: 3 | Status: ON HOLD | COMMUNITY
Start: 2019-12-02 | End: 2019-12-17

## 2019-12-02 RX ORDER — ONDANSETRON 4 MG/1
4 TABLET, ORALLY DISINTEGRATING ORAL 3 TIMES DAILY
Status: DISCONTINUED | OUTPATIENT
Start: 2019-12-02 | End: 2019-12-05

## 2019-12-02 RX ORDER — VITAMIN B COMPLEX
2000 TABLET ORAL DAILY
Status: DISCONTINUED | OUTPATIENT
Start: 2019-12-02 | End: 2019-12-10 | Stop reason: HOSPADM

## 2019-12-02 RX ORDER — ONDANSETRON 4 MG/1
4 TABLET, ORALLY DISINTEGRATING ORAL EVERY 6 HOURS PRN
Status: DISCONTINUED | OUTPATIENT
Start: 2019-12-02 | End: 2019-12-10 | Stop reason: HOSPADM

## 2019-12-02 RX ORDER — SODIUM CHLORIDE 9 MG/ML
INJECTION, SOLUTION INTRAVENOUS CONTINUOUS
Status: DISCONTINUED | OUTPATIENT
Start: 2019-12-02 | End: 2019-12-04

## 2019-12-02 RX ORDER — VALGANCICLOVIR 450 MG/1
450 TABLET, FILM COATED ORAL DAILY
Status: DISCONTINUED | OUTPATIENT
Start: 2019-12-03 | End: 2019-12-03

## 2019-12-02 RX ORDER — TRAZODONE HYDROCHLORIDE 50 MG/1
50 TABLET, FILM COATED ORAL AT BEDTIME
Status: DISCONTINUED | OUTPATIENT
Start: 2019-12-02 | End: 2019-12-03

## 2019-12-02 RX ORDER — NICOTINE POLACRILEX 4 MG
15-30 LOZENGE BUCCAL
Status: DISCONTINUED | OUTPATIENT
Start: 2019-12-02 | End: 2019-12-10 | Stop reason: HOSPADM

## 2019-12-02 RX ORDER — FERROUS SULFATE 325(65) MG
325 TABLET ORAL
Status: DISCONTINUED | OUTPATIENT
Start: 2019-12-02 | End: 2019-12-10 | Stop reason: HOSPADM

## 2019-12-02 RX ADMIN — SODIUM CHLORIDE 1000 ML: 9 INJECTION, SOLUTION INTRAVENOUS at 17:30

## 2019-12-02 RX ADMIN — MELATONIN 2000 UNITS: at 18:44

## 2019-12-02 RX ADMIN — CYANOCOBALAMIN TAB 1000 MCG 5000 MCG: 1000 TAB at 18:44

## 2019-12-02 RX ADMIN — MYCOPHENOLIC ACID 540 MG: 360 TABLET, DELAYED RELEASE ORAL at 20:23

## 2019-12-02 RX ADMIN — TRAZODONE HYDROCHLORIDE 50 MG: 50 TABLET ORAL at 22:13

## 2019-12-02 RX ADMIN — SODIUM CHLORIDE: 9 INJECTION, SOLUTION INTRAVENOUS at 17:30

## 2019-12-02 RX ADMIN — TACROLIMUS 2 MG: 1 CAPSULE ORAL at 20:24

## 2019-12-02 RX ADMIN — ONDANSETRON 4 MG: 4 TABLET, ORALLY DISINTEGRATING ORAL at 20:24

## 2019-12-02 RX ADMIN — ROSUVASTATIN CALCIUM 5 MG: 5 TABLET, FILM COATED ORAL at 20:24

## 2019-12-02 RX ADMIN — FERROUS SULFATE TAB 325 MG (65 MG ELEMENTAL FE) 325 MG: 325 (65 FE) TAB at 18:44

## 2019-12-02 ASSESSMENT — ACTIVITIES OF DAILY LIVING (ADL): ADLS_ACUITY_SCORE: 15

## 2019-12-02 ASSESSMENT — PAIN SCALES - GENERAL
PAINLEVEL: NO PAIN (0)
PAINLEVEL: NO PAIN (0)

## 2019-12-02 ASSESSMENT — MIFFLIN-ST. JEOR: SCORE: 1573.82

## 2019-12-02 NOTE — PATIENT INSTRUCTIONS
Recommendations from today's MTM visit:                                                    1. I made all the changes in the pill box today.    2. Make an appointment with me after you are discharged from the hospital.     It was great to speak with you today.  I value your experience and would be very thankful for your time with providing feedback on our clinic survey. You may receive a survey via email or text message in the next few days.     Next MTM visit: when you are discharged    To schedule another MTM appointment, please call the clinic directly or you may call the MTM scheduling line at [phone] or toll-free at [phone].     My Clinical Pharmacist's contact information:                                                      It was a pleasure talking with you today!  Please feel free to contact me with any questions or concerns you have.      Donald Bradley, PharmD  [facility] Pharmacist    Phone: [phone]

## 2019-12-02 NOTE — LETTER
2019      RE: rFandy Workman  [address]  [facility] [address]         Nephrology Clinic    Eloy Rao MD  2019     Name: Frandy Workman  MRN: [id number]  Age: 54 year old  : 1965  Referring provider: Natalie Bennett*     Assessment and Plan: Frandy Workman is a 54 year old man with  recent liver transplant (19) for  ESTRADA cirrhosis (complicated by past ascites and hepatic encephalopathy on lactulose and rifaximin), hepatocellular carcinoma (s/p TACE and microwave ablation 2019), long standing DM 2 (complicated by nonproliferative diabetic retinopathy, macular edema and peripheral neuropathy), bipolar disorder (previously on lithium for 15 years), HTN, hyperlipidemia and CAD (by angiography not requiring PCI) who presents with JERONIMO on CKD.      1. JERONIMO on CKD, stage 2:  Prior to recent liver transplant baseline Cr ~1.1 mg/dL with eGFR of 75 ml/min. However, Cr noted to be slightly higher at ~1.3 mg/dL at the time of discharge and is now 3.2 mg/dL.  Recent JERONIMO event likely pre-renal from volume depletion and further complicated by decreased renal perfusion due to tacrolimus.   At baseline prior to transplant, he does appear to have CKD though mild.  I suspect he likely has some degree of diabetic changes though not albuminuric further complicated by chronic changes from past lithium use (for 15 years).  These underlying changes likely made him susceptible to pre-renal rise in creatinine from volume depletion and tacrolimus.  He is at risk of progressive CKD due to tacrolimus and diabetes as well.      -will attempt to arrange for fluid hydration with isotonic crystalloid should he not be hospitalized for JERONIMO on CKD   -would favor lower tacrolimus levels as able and potentially changing to another immunosuppressive in the near future  -would prefer higher blood pressures to allow for renal perfusion.  Will therefore decrease carvedilol from 25 to 12.5 mg BID and  discontinue amlodipine  -no pressing need to start RAAS blockade with ACEi/ARB therapy anytime soon especially given current JERONIMO     2. HTN/Volume status: Hypotensive (/66) in clinic today.  Hypovolemic on exam.    -would favor high blood pressures to assure adequate renal perfusion especially given recent JERONIMO.      -decrease carvedilol to 12.5 mg bid *which may be more for esophageal varices) and discontinue amlodipine    3.  Electrolytes:  Potassium on higher side.  Suspect multifactorial in nature and due to JERONIMO on CKD, hyperglycemia, tacrolimus and potentially Bactrim.    -should improve with IVF, improved kidney function, normoglycemia and lower tacrolimus levels     Follow-up: Return in about 6 months       Reason For Visit: CKD     HPI:   Frandy Workman is a 54 year old man who presents CKD f/u.  His past medical history is remarkable for recent liver transplant (November, 2019) for ESTRADA cirrhosis (complicated by past ascites and hepatic encephalopathy on lactulose and rifaximin in the past), hepatocellular carcinoma (s/p TACE and microwave ablation 01/2019), long standing DM 2 (complicated by nonproliferative diabetic retinopathy, macular edema and peripheral neuropathy), bipolar disorder (previously on lithium for 15 years), HTN, hyperlipidemia and CAD by angiography not requiring PCI.      In terms of CKD, he appears to have a baseline of ~1.1 mg/dL over the past year.  However, he was noted to have a rise in creatinine to ~1.7 mg/dL in May, 2019 after initiation of diuretic therapy.  He discontinued diuretics and his Cr came  back to baseline.  He denies excessive use of NSAIDs.  He had no history of nephrolithiasis or frequent UTIs.  He has no significant family history of CKD.      Today the patient reports recent nausea and vomiting (yesterday) possibly due to his CellCept medication. Yesterday was the first episode of vomiting after a week of nausea. His appetite and drinking have been  decreased since his transplant surgery. He was expected to have his stiches removed today. He is anxious about all his new medications and his ability to manage at home.  We talked at length regarding his rise in creatinine since hospital discharge and potential causes including decreased PO intake and high tacrolimus levels.     Review of Systems:   Pertinent items are noted in HPI or as below, remainder of complete ROS is negative.      Active Medications:     Current Outpatient Medications:      alpha-lipoic acid 600 MG capsule, Take 1 capsule (600 mg) by mouth daily, Disp: 90 capsule, Rfl: 3     amLODIPine (NORVASC) 5 MG tablet, Take 1 tablet (5 mg) by mouth daily, Disp: 30 tablet, Rfl: 3     Artificial Tear Solution (SM ARTIFICIAL TEARS) SOLN, Place 1 drop into the right eye every hour as needed Apply at least 4 times daily and as needed for dry eye, Disp: 1 Bottle, Rfl: 3     aspirin (ASA) 325 MG EC tablet, Take 1 tablet (325 mg) by mouth daily, Disp: 30 tablet, Rfl: 3     BD VIKTORIA U/F 32G X 4 MM insulin pen needle, Use 5 per day, Disp: 300 each, Rfl: 3     blood glucose (ACCU-CHEK EDINSON PLUS) test strip, USE TO TEST FOUR TIMES DAILY OR AS DIRECTED, Disp: 400 strip, Rfl: 6     blood glucose monitoring (ACCU-CHEK EDINSON PLUS) test strip, Use to test blood sugar 4 times daily, Disp: 400 each, Rfl: 3     blood glucose monitoring (ACCU-CHEK FASTCLIX) lancets, Use to test blood sugar 4 times daily or as directed.  1 box = 102 lancets, Disp: 408 each, Rfl: 3     carvedilol (COREG) 25 MG tablet, Take 1 tablet (25 mg) by mouth 2 times daily (with meals), Disp: 60 tablet, Rfl: 3     Cyanocobalamin 5000 MCG TBDP, Take 5,000 mcg by mouth daily, Disp: , Rfl:      econazole nitrate 1 % external cream, Apply topically daily To feet and toenails., Disp: 85 g, Rfl: 0     ferrous sulfate (FEROSUL) 325 (65 Fe) MG tablet, Take 1 tablet (325 mg) by mouth 3 times daily (with meals), Disp: 90 tablet, Rfl: 3     glucose (BD GLUCOSE) 4  g chewable tablet, Take 1 tablet by mouth every hour as needed for low blood sugar, Disp: 60 tablet, Rfl: 1     insulin aspart (NOVOLOG PEN) 100 UNIT/ML pen, Inject 1-7 Units Subcutaneous 3 times daily (with meals) DOSE:  1 units per 5 grams of carbohydrate.  Only chart total amount of units given.  Do not give if pre-prandial glucose is less than 60 mg/dL. If given at mealtime, administer within 30 minutes of start of meal, Disp: 3 mL, Rfl: 3     insulin aspart (NOVOLOG PEN) 100 UNIT/ML pen, Inject 1-7 Units Subcutaneous Take with snacks or supplements for high blood sugar DOSE:  1 units per 5 grams of carbohydrate. Only chart total amount of units given.  Do not give if pre-prandial glucose is less than 60 mg/dL. If given at mealtime, administer within 30 minutes of start of meal, Disp: , Rfl:      insulin aspart (NOVOLOG PEN) 100 UNIT/ML pen, Inject 1-10 Units Subcutaneous 3 times daily (with meals) Correction Scale - custom DOSING    Do Not give Correction Insulin if Pre-Meal BG less than 140   to 159 give 1 units.   to 179 give 2 units.   to 199 give 3 units.   to 219 give 4 units.   to 239 give 5 units.   to 259 give 6 units.   to 279 give 7 units.   to 299 give 8 units.   to 319 give 9 units.   to 339 give 10 units.  To be given with prandial insulin, and based on pre-meal blood glucose.  Notify provider if glucose greater than or equal 340 mg/dL after administration. If given at mealtime, administer within 30 minutes of start of meal, Disp: 3 mL, Rfl:      insulin aspart (NOVOLOG PEN) 100 UNIT/ML pen, Inject 1-11 Units Subcutaneous At Bedtime Correction Scale - custom DOSING (1:20)  to 219 give 1 units.   to 239 give 2 units.   to 259 give 3 units.   to 279 give 4 units.   to 299 give 5 units.   to 319 give 6 units.   to 339 give 7 units.   to 359 give 8 units   to 379 give 9 units.  to 399  give 10 units. BG >/=400 give 11 units. Notify provider if glucose greater than or equal to 400 mg/dL after administration. If given at mealtime, administer within 30 minutes of start of meal, Disp: , Rfl:      insulin degludec (TRESIBA) 200 UNIT/ML pen, Inject 15 Units Subcutaneous daily , Disp: 100 mL, Rfl: 3     loperamide (IMODIUM) 2 MG capsule, Take 1 capsule (2 mg) by mouth 4 times daily as needed for diarrhea, Disp: 20 capsule, Rfl: 1     Multiple Vitamin (THERAVITE PO), Take 1 tablet by mouth every morning , Disp: , Rfl:      mycophenolic acid (GENERIC EQUIVALENT) 180 MG EC tablet, Take 3 tablets (540 mg) by mouth every 12 hours, Disp: 180 tablet, Rfl: 3     omeprazole (PRILOSEC) 40 MG DR capsule, Take 1 capsule (40 mg) by mouth daily, Disp: 90 capsule, Rfl: 2     ondansetron (ZOFRAN-ODT) 4 MG ODT tab, Take 1 tablet (4 mg) by mouth every 6 hours as needed for nausea or vomiting, Disp: 10 tablet, Rfl: 0     order for DME, Equipment being ordered: Cane () Treatment Diagnosis: impaired gait, Disp: 1 each, Rfl: 0     oxyCODONE (ROXICODONE) 5 MG tablet, Take 1 tablet (5 mg) by mouth every 6 hours as needed for moderate to severe pain, Disp: 20 tablet, Rfl: 0     rosuvastatin (CRESTOR) 5 MG tablet, Take 1 tablet (5 mg) by mouth daily, Disp: 30 tablet, Rfl: 3     sulfamethoxazole-trimethoprim (BACTRIM/SEPTRA) 400-80 MG tablet, Take 1 tablet by mouth daily, Disp: 30 tablet, Rfl: 11     tacrolimus (GENERIC EQUIVALENT) 1 MG capsule, Take 2 capsules (2 mg) by mouth 2 times daily, Disp: 120 capsule, Rfl: 11     tamsulosin (FLOMAX) 0.4 MG capsule, TAKE 1 CAPSULE(0.4 MG) BY MOUTH DAILY, Disp: 90 capsule, Rfl: 3     tenofovir (VIREAD) 300 MG tablet, Take 1 tablet (300 mg) by mouth daily, Disp: 30 tablet, Rfl: 11     traZODone (DESYREL) 50 MG tablet, Take 1 tablet (50 mg) by mouth At Bedtime, Disp: 90 tablet, Rfl: 1     valGANciclovir (VALCYTE) 450 MG tablet, Take 1 tablet (450 mg) by mouth daily, Disp: 30 tablet, Rfl:  5     vitamin D3 (CHOLECALCIFEROL) 2000 units (50 mcg) tablet, Take 1 tablet by mouth daily, Disp: , Rfl:     Current Facility-Administered Medications:      Aflibercept (EYLEA) injection 2 mg, 2 mg, Intravitreal, Q28 Days, Enriqueta Martin MD, 2 mg at 06/27/19 1303     ranibizumab (LUCENTIS) injection syringe 0.5 mg, 0.5 mg, Intravitreal, Q28 Days, Enriqueta Martin MD     Allergies:   Codeine and Seasonal allergies      Past Medical History:  Past Medical History:   Diagnosis Date     Anemia 2013    Low blood plates current is 37     Arthritis      BPH (benign prostatic hyperplasia)      CAD (coronary artery disease) 4/1/2019     Cholelithiasis      Conductive hearing loss 8/16/2017    Have a lump on my right side of my face.  Had wax discharge     Depressive disorder 1986    Suffer effects throughout life     Gastroesophageal reflux disease 12/1/2014    Being treated with Prilosac     HCC (hepatocellular carcinoma) (H) 1/22/2019     History of diabetic retinopathy 07/2018     HTN (hypertension)      HTN (hypertension) 11/20/2019     Hyperlipidemia      Liver cirrhosis secondary to ESTRADA (H)      Portal vein thrombosis 4/11/2019     Type II diabetes mellitus (H)     Insulin adminstered BID daily.      Patient Active Problem List   Diagnosis     Hepatic cirrhosis (H)     Type II diabetes mellitus (H)     Bipolar affective disorder in remission (H)     Esophageal varices (H)     Nonalcoholic steatohepatitis (ESTRADA)     Brow ptosis     Paralytic lagophthalmos of right upper eyelid     Erectile dysfunction due to diseases classified elsewhere     HCC (hepatocellular carcinoma) (H)     Equivocal stress echocardiogram     Type 2 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, unspecified whether long term insulin use (H)     Abnormal findings diagnostic imaging of heart and coronary circulation     Status post coronary angiogram     CAD (coronary artery disease)      Portal vein thrombosis     Liver transplant recipient (H)     Status post liver transplantation (H)     Immunosuppressed status (H)     Anemia due to blood loss, acute     Thrombocytopenia (H)     HTN (hypertension)     Diarrhea     Delirium     Malnutrition related to chronic disease (H)     Hypernatremia     Hypophosphatasia        Past Surgical History:  Past Surgical History:   Procedure Laterality Date     COLONOSCOPY      2015     CV HEART CATHETERIZATION WITH POSSIBLE INTERVENTION N/A 2/26/2019    Procedure: CORS;  Surgeon: Jagdish Hoyt MD;  Location:  HEART CARDIAC CATH LAB     ESOPHAGOSCOPY, GASTROSCOPY, DUODENOSCOPY (EGD), COMBINED N/A 11/17/2016    Procedure: COMBINED ESOPHAGOSCOPY, GASTROSCOPY, DUODENOSCOPY (EGD);  Surgeon: Santi Rosas MD;  Location:  GI     ESOPHAGOSCOPY, GASTROSCOPY, DUODENOSCOPY (EGD), COMBINED N/A 11/17/2017    Procedure: COMBINED ESOPHAGOSCOPY, GASTROSCOPY, DUODENOSCOPY (EGD);  EGD;  Surgeon: Santi Rosas MD;  Location:  GI     ESOPHAGOSCOPY, GASTROSCOPY, DUODENOSCOPY (EGD), COMBINED N/A 12/28/2018    Procedure: EGD;  Surgeon: Santi Rosas MD;  Location:  OR     HEAD & NECK SURGERY      12/2017 at [facility].      IMPLANT GOLD WEIGHT EYELID Right 11/16/2017    Procedure: IMPLANT WEIGHT EYELID;  Right Upper Eyelid Weight, right tarsal strip lower eyelid;  Surgeon: Milana Malave MD;  Location:  OR     IR CHEMO EMBOLIZATION  1/22/2019     KNEE SURGERY Left      ORTHOPEDIC SURGERY       PAROTIDECTOMY, RADICAL NECK DISSECTION Right 11/2/2017    Procedure: PAROTIDECTOMY, RADICAL NECK DISSECTION;  Right Superfacial Parotidectomy , Facial nerve repair. with [facility] facial nerve monitor.;  Surgeon: Asiya Morgan MD;  Location: [facility] OR     PICC INSERTION Left 11/06/2017    4fr SL BioFlo PICC, 44cm in the L basilic vein w/ tip in the low SVC     RETURN LIVER TRANSPLANT N/A 11/12/2019    Procedure: Exploratory laparotomy, hematoma  evacuation, abdominal washout;  Surgeon: Александр Ramos MD;  Location: [facility] OR     TRANSPLANT LIVER RECIPIENT  DONOR N/A 2019    Procedure: TRANSPLANT, LIVER, RECIPIENT,  DONOR;  Surgeon: Александр Ramos MD;  Location: [facility] OR     VASCULAR SURGERY         Family History:   Family History   Problem Relation Age of Onset     Prostate Cancer Maternal Grandfather      Substance Abuse Maternal Grandfather         Alcohol     Colon Cancer Father 60     Pancreatic Cancer Father 60     Prostate Cancer Father      Colorectal Cancer Father      Macular Degeneration Father      Cancer Father      Glaucoma Father      Skin Cancer Father      Colorectal Cancer Maternal Grandmother      Cancer Maternal Grandmother      Substance Abuse Maternal Grandmother         Alcohol     Colorectal Cancer Paternal Grandmother      Cancer Mother      Diabetes Mother          3/2016     Cerebrovascular Disease Mother         Passed away in Feb of this year, 80 years old.     Thyroid Disease Mother      Depression Mother      Asthma Sister         Had since birth     Thyroid Disease Sister      Depression Sister      Liver Disease No family hx of      Melanoma No family hx of          Social History:   Social History     Tobacco Use     Smoking status: Never Smoker     Smokeless tobacco: Former User     Types: Chew     Tobacco comment: 1 tin per week   Substance Use Topics     Alcohol use: No     Alcohol/week: 0.0 standard drinks     Comment: quit 1996     Drug use: No        Physical Exam:  /66   Pulse 91   Temp 97.6  F (36.4  C)   Wt 74.9 kg (165 lb 3.2 oz)   SpO2 100%   BMI 24.40 kg/m      GA: anxious but in NAD  HEENT: no scleral icterus, no cervical LAD  CV: regular, no rub, no JVD, no edema  PULM: Lungs CTA B  GI: abd soft, NT, ND  : no CVA tenderness  MSK: no joint effusions  SKIN: no concerning rash  NEURO: no gross focal deficit  PSYCH: anxious    Laboratory:  CMP  Recent Labs   Lab Test  12/02/19  1034 11/27/19  0844 11/25/19  0905 11/21/19  0835   * 135 136 140   POTASSIUM 5.5* 5.1 5.2 4.0   CHLORIDE 106 110* 112* 114*   CO2 14* 18* 18* 21   ANIONGAP 11 7 7 5   * 51* 58* 129*   BUN 86* 47* 40* 25   CR 3.17* 1.88* 1.81* 1.33*   GFRESTIMATED 21* 39* 41* 59*   GFRESTBLACK 24* 45* 47* 69   DEBORAH 9.4 9.9 9.6 8.0*   MAG 2.4* 2.2 2.2 2.5*   PHOS 4.7* 4.0 3.6 3.6   PROTTOTAL 7.5 7.7 7.1 6.0*   ALBUMIN 3.0* 3.0* 2.7* 2.4*   BILITOTAL 0.5 0.7 0.6 0.8   ALKPHOS 185* 214* 229* 210*   AST 16 24 28 21   ALT 26 33 32 53     CBC  Recent Labs   Lab Test 12/02/19  1034 11/27/19  0844 11/25/19  0905 11/21/19  0835   HGB 7.6* 9.6* 9.6* 9.6*   WBC 6.7 7.6 7.3 7.4   RBC 2.41* 3.07* 3.04* 3.08*   HCT 23.4* 29.2* 29.1* 29.5*   MCV 97 95 96 96   MCH 31.5 31.3 31.6 31.2   MCHC 32.5 32.9 33.0 32.5   RDW 19.9* 19.5* 19.8* 18.0*    222 187 129*     INR  Recent Labs   Lab Test 11/16/19  0648 11/15/19  0449 11/14/19  0551 11/14/19  0018  11/12/19  1400  11/12/19  0346  11/11/19  1651 11/11/19  1600   INR 1.11 1.12 1.19* 1.24*   < > 1.46*   < > 1.47*   < >  --  1.60*   PTT  --   --   --   --   --  39*  --  57*  --  48* 48*    < > = values in this interval not displayed.     ABG  Recent Labs   Lab Test 11/12/19  1513 11/12/19  1400 11/11/19  1735 11/11/19  1651 11/11/19  1600   PH 7.37 7.36  --  7.41 7.48*   PCO2 42 43  --  42 36   PO2 114* 92  --  81 98   HCO3 24 24  --  27 26   O2PER 44% 41% 30 32 32.0      URINE STUDIES  Recent Labs   Lab Test 11/15/19  1123 07/08/19  1017 02/04/19  0705 01/25/19  0520   COLOR Light Yellow Yellow Yellow Yellow   APPEARANCE Clear Clear Clear Clear   URINEGLC 300* >499* 150* >1000*   URINEBILI Negative Negative Negative Negative   URINEKETONE Negative Negative Negative Negative   SG 1.007 1.017 1.016 1.020   UBLD Small* Negative Negative Negative   URINEPH 6.5 5.0 5.0 5.0   PROTEIN Negative Negative Negative Negative   NITRITE Negative Negative Negative Negative   LEUKEST  Negative Negative Negative Negative   RBCU 0 <1 1 <1   WBCU <1 3 1 <1     Recent Labs   Lab Test 07/08/19  1017 02/04/19  0705   [facility]PG 0.11 0.14     PTH  Recent Labs   Lab Test 07/08/19  1020   PTHI 54     IRON STUDIES   Recent Labs   Lab Test 12/28/18  1108 09/15/18  1215 06/09/17  1250   IRON  --  52  --    FEB  --  403  --    IRONSAT  --  13*  --    QUAN 90 10* 450*       Imaging:     Scribe Disclosure:   Ga HUA, am serving as a scribe to document services personally performed by Eloy Rao MD at this visit, based upon the provider's statements to me. All documentation has been reviewed by the aforementioned provider prior to being entered into the official medical record.     Ga HUA, served as a scribe preparing the chart for the clinic encounter through chart review for the provider team.     Total time spent was >60 minutes, and more than 50% of face to face time was spent in counseling and/or coordination of care regarding principles of multidisciplinary care, medication management, and JERONIMO and chronic kidney disease education.  Eloy Rao MD

## 2019-12-02 NOTE — PROGRESS NOTES
SUBJECTIVE/OBJECTIVE:                Frandy Workman is a 54 year old male coming in for a follow up MTM visit.  He was referred post txp.     Chief Complaint: Follow up to discuss blood sugars, but patient was very concerned about not knowing what his medications were for. He is worried if he takes them wrong he will die. He does have home care come over to help him set up. Dr. Rao is admitting him today because his creatinine spiked. He is reducing his blood pressure medications today too.     Allergies/ADRs: Reviewed in Epic  Tobacco:  reports that he has never smoked. He quit smokeless tobacco use about 2 years ago.  His smokeless tobacco use included chew.  Alcohol: not currently using  Caffeine: 0-1 cups/day of coffee  PMH: Reviewed in Epic    Medication Adherence/Access:  Patient uses pill box(es). Patient has homecare coming twice weekly to help with meds.   Patient takes medications 3 time(s) per day. 8, 6, 8  Per patient, misses medication 0 times per week.   Medication barriers: none.   The patient fills medications at [facility]: YES.    Liver Transplant:  Current immunosuppressants include TAC 2mg BID and Myfortic 540mg BID (changed recently, not updated yet in pill box).  Pt reports no side effects  Transplant date: 11/11/19  Estimated Creatinine Clearance: 29.2 mL/min (A) (based on SCr of 3.17 mg/dL (H)).  CMV prophylaxis: CrCl 40 to 59 mL/minute: Valcyte 450 mg once daily Donor (+), Recipient (-), treat 6 months post tx   PCP prophylaxis: Bactrim S S daily for 6 months post txp  PPI use: Omeprazole 40mg daily  Current supplements for electrolyte replacement: MVI daily, Ferrous suflate 325mg TID.   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   Immunizations: annual flu shot 2019; [id number]:  2015; Prevnar 13: 2019; TDaP:  2015; Shingrix: unknown    Today's Vitals: There were no vitals taken for this  visit.    ASSESSMENT:                 Medication Adherence: good, no issues identified. Reviewed indications and all medications today. Did not have time to discuss blood sugar as patient is being admitted. Changes were made to his pill box today as well.     Liver Transplant:  No changes.     PLAN:                1. Pt to make appt with MTM for further blood sugar and medication discussion post discharge.    I spent 30 minutes with this patient today. I offer these suggestions for consideration by txp team. A copy of the visit note was provided to the patient's referring provider.    Will follow up post discharge.    The patient was given a summary of these recommendations as an after visit summary.    Donald Bradley, PharmD  [facility] Pharmacist    Phone: [phone]

## 2019-12-02 NOTE — PROGRESS NOTES
Care Coordinator  D: Patient admitted from the clinic for JERONIMO following a Liver transplant on 11/11/2019. Patient states he was having Nausea and vomiting yesterday, however he states it is better today after changing his cellcept to myfortic--per Maggie SUN bedside RN.  Per LILLIAN Grewal, pt is failing at home as he has no caregiver--his friend Art went back to [facility] and pt is paying for a pca 3hrs/day at home.  Will order PT/OT, needs enteral feeds  I: Pt was referred to [facility] at discharge on 11/20--I have let kathya Jones know and sent office and email, per pt request.  A: s/p LT  P: Wait for PT/OT input--I will call [facility] to check enteral coverage. 7A sw, will let  TCU kathya know about him. Will follow. OPCC: Radha Ndiaye.

## 2019-12-02 NOTE — PLAN OF CARE
Patient admitted from the clinic for JERONIMO following a Liver transplant on 11/11/2019. Patient states he was having Nausea and vomiting yesterday, however he states it is better today after changing his cellcept to myfortic. Patient's VSS, afebrile, denies pain. Patient oriented to room and call light, will place PIV and start IV fluids. Will do required admission documentation and notify MD with any changes or concerns.   Patient having diarrhea; enteric panel, C-diff and other labs ordered, will move to private room when one becomes available.   Patient encouraged to eat; calorie counts ordered, Sliding scale insulin and carb coverage ordered.

## 2019-12-02 NOTE — PROGRESS NOTES
Nephrology Clinic    Eloy Rao MD  2019     Name: Frandy Workman  MRN: [id number]  Age: 54 year old  : 1965  Referring provider: Natalie Bennett*     Assessment and Plan: Frandy Workman is a 54 year old man with recent liver transplant (19) for  ESTRADA cirrhosis (complicated by past ascites and hepatic encephalopathy on lactulose and rifaximin), hepatocellular carcinoma (s/p TACE and microwave ablation 2019), long standing DM 2 (complicated by nonproliferative diabetic retinopathy, macular edema and peripheral neuropathy), bipolar disorder (previously on lithium for 15 years), HTN, hyperlipidemia and CAD (by angiography not requiring PCI) who presents with JERONIMO on CKD.      1. JERONIMO on CKD, stage 2: Prior to recent liver transplant baseline Cr ~1.1 mg/dL with eGFR of 75 ml/min. However, Cr noted to be slightly higher at ~1.3 mg/dL at the time of discharge and is now 3.2 mg/dL.  Recent JERONIMO event likely pre-renal from volume depletion and further complicated by decreased renal perfusion due to tacrolimus.  At baseline prior to transplant, he does appear to have CKD though mild.  I suspect he likely has some degree of diabetic changes though not albuminuric further complicated by chronic changes from past lithium use (for 15 years).  These underlying changes likely made him susceptible to pre-renal rise in creatinine from volume depletion and tacrolimus.  He is at risk of progressive CKD due to tacrolimus and diabetes as well.      -will attempt to arrange for fluid hydration with isotonic crystalloid should he not be hospitalized for JERONIMO on CKD   -would favor lower tacrolimus levels as able and potentially changing to another immunosuppressive in the near future  -would prefer higher blood pressures to allow for renal perfusion.  Will therefore decrease carvedilol from 25 to 12.5 mg BID and discontinue amlodipine  -no pressing need to start RAAS blockade with ACEi/ARB  therapy anytime soon especially given current JERONIMO     2. HTN/Volume status: Hypotensive (/66) in clinic today.  Hypovolemic on exam.    -would favor high blood pressures to assure adequate renal perfusion especially given recent JERONIMO.      -decrease carvedilol to 12.5 mg bid *which may be more for esophageal varices) and discontinue amlodipine    3.  Electrolytes:  Potassium on higher side.  Suspect multifactorial in nature and due to JERONIMO on CKD, hyperglycemia, tacrolimus and potentially Bactrim.    -should improve with IVF, improved kidney function, normoglycemia and lower tacrolimus levels     Follow-up: Return in about 6 months       Reason For Visit: CKD     HPI:   Frandy Workman is a 54 year old man who presents CKD f/u.  His past medical history is remarkable for recent liver transplant (November, 2019) for ESTRADA cirrhosis (complicated by past ascites and hepatic encephalopathy on lactulose and rifaximin in the past), hepatocellular carcinoma (s/p TACE and microwave ablation 01/2019), long standing DM 2 (complicated by nonproliferative diabetic retinopathy, macular edema and peripheral neuropathy), bipolar disorder (previously on lithium for 15 years), HTN, hyperlipidemia and CAD by angiography not requiring PCI.      In terms of CKD, he appears to have a baseline of ~1.1 mg/dL over the past year.  However, he was noted to have a rise in creatinine to ~1.7 mg/dL in May, 2019 after initiation of diuretic therapy.  He discontinued diuretics and his Cr came  back to baseline.  He denies excessive use of NSAIDs.  He had no history of nephrolithiasis or frequent UTIs.  He has no significant family history of CKD.      Today the patient reports recent nausea and vomiting (yesterday) possibly due to his CellCept medication. Yesterday was the first episode of vomiting after a week of nausea. His appetite and drinking have been decreased since his transplant surgery. He was expected to have his stiches removed  today. He is anxious about all his new medications and his ability to manage at home.  We talked at length regarding his rise in creatinine since hospital discharge and potential causes including decreased PO intake and high tacrolimus levels.     Review of Systems:   Pertinent items are noted in HPI or as below, remainder of complete ROS is negative.      Active Medications:     Current Outpatient Medications:      alpha-lipoic acid 600 MG capsule, Take 1 capsule (600 mg) by mouth daily, Disp: 90 capsule, Rfl: 3     amLODIPine (NORVASC) 5 MG tablet, Take 1 tablet (5 mg) by mouth daily, Disp: 30 tablet, Rfl: 3     Artificial Tear Solution (SM ARTIFICIAL TEARS) SOLN, Place 1 drop into the right eye every hour as needed Apply at least 4 times daily and as needed for dry eye, Disp: 1 Bottle, Rfl: 3     aspirin (ASA) 325 MG EC tablet, Take 1 tablet (325 mg) by mouth daily, Disp: 30 tablet, Rfl: 3     BD VIKOTRIA U/F 32G X 4 MM insulin pen needle, Use 5 per day, Disp: 300 each, Rfl: 3     blood glucose (ACCU-CHEK EDINSON PLUS) test strip, USE TO TEST FOUR TIMES DAILY OR AS DIRECTED, Disp: 400 strip, Rfl: 6     blood glucose monitoring (ACCU-CHEK EDINSON PLUS) test strip, Use to test blood sugar 4 times daily, Disp: 400 each, Rfl: 3     blood glucose monitoring (ACCU-CHEK FASTCLIX) lancets, Use to test blood sugar 4 times daily or as directed.  1 box = 102 lancets, Disp: 408 each, Rfl: 3     carvedilol (COREG) 25 MG tablet, Take 1 tablet (25 mg) by mouth 2 times daily (with meals), Disp: 60 tablet, Rfl: 3     Cyanocobalamin 5000 MCG TBDP, Take 5,000 mcg by mouth daily, Disp: , Rfl:      econazole nitrate 1 % external cream, Apply topically daily To feet and toenails., Disp: 85 g, Rfl: 0     ferrous sulfate (FEROSUL) 325 (65 Fe) MG tablet, Take 1 tablet (325 mg) by mouth 3 times daily (with meals), Disp: 90 tablet, Rfl: 3     glucose (BD GLUCOSE) 4 g chewable tablet, Take 1 tablet by mouth every hour as needed for low blood sugar,  Disp: 60 tablet, Rfl: 1     insulin aspart (NOVOLOG PEN) 100 UNIT/ML pen, Inject 1-7 Units Subcutaneous 3 times daily (with meals) DOSE:  1 units per 5 grams of carbohydrate.  Only chart total amount of units given.  Do not give if pre-prandial glucose is less than 60 mg/dL. If given at mealtime, administer within 30 minutes of start of meal, Disp: 3 mL, Rfl: 3     insulin aspart (NOVOLOG PEN) 100 UNIT/ML pen, Inject 1-7 Units Subcutaneous Take with snacks or supplements for high blood sugar DOSE:  1 units per 5 grams of carbohydrate. Only chart total amount of units given.  Do not give if pre-prandial glucose is less than 60 mg/dL. If given at mealtime, administer within 30 minutes of start of meal, Disp: , Rfl:      insulin aspart (NOVOLOG PEN) 100 UNIT/ML pen, Inject 1-10 Units Subcutaneous 3 times daily (with meals) Correction Scale - custom DOSING    Do Not give Correction Insulin if Pre-Meal BG less than 140   to 159 give 1 units.   to 179 give 2 units.   to 199 give 3 units.   to 219 give 4 units.   to 239 give 5 units.   to 259 give 6 units.   to 279 give 7 units.   to 299 give 8 units.   to 319 give 9 units.   to 339 give 10 units.  To be given with prandial insulin, and based on pre-meal blood glucose.  Notify provider if glucose greater than or equal 340 mg/dL after administration. If given at mealtime, administer within 30 minutes of start of meal, Disp: 3 mL, Rfl:      insulin aspart (NOVOLOG PEN) 100 UNIT/ML pen, Inject 1-11 Units Subcutaneous At Bedtime Correction Scale - custom DOSING (1:20)  to 219 give 1 units.   to 239 give 2 units.   to 259 give 3 units.   to 279 give 4 units.   to 299 give 5 units.   to 319 give 6 units.   to 339 give 7 units.   to 359 give 8 units   to 379 give 9 units.  to 399 give 10 units. BG >/=400 give 11 units. Notify provider if glucose greater than or  equal to 400 mg/dL after administration. If given at mealtime, administer within 30 minutes of start of meal, Disp: , Rfl:      insulin degludec (TRESIBA) 200 UNIT/ML pen, Inject 15 Units Subcutaneous daily , Disp: 100 mL, Rfl: 3     loperamide (IMODIUM) 2 MG capsule, Take 1 capsule (2 mg) by mouth 4 times daily as needed for diarrhea, Disp: 20 capsule, Rfl: 1     Multiple Vitamin (THERAVITE PO), Take 1 tablet by mouth every morning , Disp: , Rfl:      mycophenolic acid (GENERIC EQUIVALENT) 180 MG EC tablet, Take 3 tablets (540 mg) by mouth every 12 hours, Disp: 180 tablet, Rfl: 3     omeprazole (PRILOSEC) 40 MG DR capsule, Take 1 capsule (40 mg) by mouth daily, Disp: 90 capsule, Rfl: 2     ondansetron (ZOFRAN-ODT) 4 MG ODT tab, Take 1 tablet (4 mg) by mouth every 6 hours as needed for nausea or vomiting, Disp: 10 tablet, Rfl: 0     order for DME, Equipment being ordered: Cane () Treatment Diagnosis: impaired gait, Disp: 1 each, Rfl: 0     oxyCODONE (ROXICODONE) 5 MG tablet, Take 1 tablet (5 mg) by mouth every 6 hours as needed for moderate to severe pain, Disp: 20 tablet, Rfl: 0     rosuvastatin (CRESTOR) 5 MG tablet, Take 1 tablet (5 mg) by mouth daily, Disp: 30 tablet, Rfl: 3     sulfamethoxazole-trimethoprim (BACTRIM/SEPTRA) 400-80 MG tablet, Take 1 tablet by mouth daily, Disp: 30 tablet, Rfl: 11     tacrolimus (GENERIC EQUIVALENT) 1 MG capsule, Take 2 capsules (2 mg) by mouth 2 times daily, Disp: 120 capsule, Rfl: 11     tamsulosin (FLOMAX) 0.4 MG capsule, TAKE 1 CAPSULE(0.4 MG) BY MOUTH DAILY, Disp: 90 capsule, Rfl: 3     tenofovir (VIREAD) 300 MG tablet, Take 1 tablet (300 mg) by mouth daily, Disp: 30 tablet, Rfl: 11     traZODone (DESYREL) 50 MG tablet, Take 1 tablet (50 mg) by mouth At Bedtime, Disp: 90 tablet, Rfl: 1     valGANciclovir (VALCYTE) 450 MG tablet, Take 1 tablet (450 mg) by mouth daily, Disp: 30 tablet, Rfl: 5     vitamin D3 (CHOLECALCIFEROL) 2000 units (50 mcg) tablet, Take 1 tablet by  mouth daily, Disp: , Rfl:     Current Facility-Administered Medications:      Aflibercept (EYLEA) injection 2 mg, 2 mg, Intravitreal, Q28 Days, Enriqueta Martin MD, 2 mg at 06/27/19 1303     ranibizumab (LUCENTIS) injection syringe 0.5 mg, 0.5 mg, Intravitreal, Q28 Days, Enriqueta Matrin MD     Allergies:   Codeine and Seasonal allergies      Past Medical History:  Past Medical History:   Diagnosis Date     Anemia 2013    Low blood plates current is 37     Arthritis      BPH (benign prostatic hyperplasia)      CAD (coronary artery disease) 4/1/2019     Cholelithiasis      Conductive hearing loss 8/16/2017    Have a lump on my right side of my face.  Had wax discharge     Depressive disorder 1986    Suffer effects throughout life     Gastroesophageal reflux disease 12/1/2014    Being treated with Prilosac     HCC (hepatocellular carcinoma) (H) 1/22/2019     History of diabetic retinopathy 07/2018     HTN (hypertension)      HTN (hypertension) 11/20/2019     Hyperlipidemia      Liver cirrhosis secondary to ESTRADA (H)      Portal vein thrombosis 4/11/2019     Type II diabetes mellitus (H)     Insulin adminstered BID daily.      Patient Active Problem List   Diagnosis     Hepatic cirrhosis (H)     Type II diabetes mellitus (H)     Bipolar affective disorder in remission (H)     Esophageal varices (H)     Nonalcoholic steatohepatitis (ESTRADA)     Brow ptosis     Paralytic lagophthalmos of right upper eyelid     Erectile dysfunction due to diseases classified elsewhere     HCC (hepatocellular carcinoma) (H)     Equivocal stress echocardiogram     Type 2 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, unspecified whether long term insulin use (H)     Abnormal findings diagnostic imaging of heart and coronary circulation     Status post coronary angiogram     CAD (coronary artery disease)     Portal vein thrombosis     Liver transplant recipient (H)     Status post  liver transplantation (H)     Immunosuppressed status (H)     Anemia due to blood loss, acute     Thrombocytopenia (H)     HTN (hypertension)     Diarrhea     Delirium     Malnutrition related to chronic disease (H)     Hypernatremia     Hypophosphatasia        Past Surgical History:  Past Surgical History:   Procedure Laterality Date     COLONOSCOPY      2015     CV HEART CATHETERIZATION WITH POSSIBLE INTERVENTION N/A 2/26/2019    Procedure: CORS;  Surgeon: Jagdish Hoyt MD;  Location:  HEART CARDIAC CATH LAB     ESOPHAGOSCOPY, GASTROSCOPY, DUODENOSCOPY (EGD), COMBINED N/A 11/17/2016    Procedure: COMBINED ESOPHAGOSCOPY, GASTROSCOPY, DUODENOSCOPY (EGD);  Surgeon: Santi Rosas MD;  Location:  GI     ESOPHAGOSCOPY, GASTROSCOPY, DUODENOSCOPY (EGD), COMBINED N/A 11/17/2017    Procedure: COMBINED ESOPHAGOSCOPY, GASTROSCOPY, DUODENOSCOPY (EGD);  EGD;  Surgeon: Santi Rosas MD;  Location:  GI     ESOPHAGOSCOPY, GASTROSCOPY, DUODENOSCOPY (EGD), COMBINED N/A 12/28/2018    Procedure: EGD;  Surgeon: Santi Rosas MD;  Location:  OR     HEAD & NECK SURGERY      12/2017 at [facility].      IMPLANT GOLD WEIGHT EYELID Right 11/16/2017    Procedure: IMPLANT WEIGHT EYELID;  Right Upper Eyelid Weight, right tarsal strip lower eyelid;  Surgeon: Milana Malave MD;  Location:  OR     IR CHEMO EMBOLIZATION  1/22/2019     KNEE SURGERY Left      ORTHOPEDIC SURGERY       PAROTIDECTOMY, RADICAL NECK DISSECTION Right 11/2/2017    Procedure: PAROTIDECTOMY, RADICAL NECK DISSECTION;  Right Superfacial Parotidectomy , Facial nerve repair. with [facility] facial nerve monitor.;  Surgeon: Asiya Morgan MD;  Location: [facility] OR     PICC INSERTION Left 11/06/2017    4fr SL BioFlo PICC, 44cm in the L basilic vein w/ tip in the low SVC     RETURN LIVER TRANSPLANT N/A 11/12/2019    Procedure: Exploratory laparotomy, hematoma evacuation, abdominal washout;  Surgeon: Александр Ramos MD;  Location: [facility] OR      TRANSPLANT LIVER RECIPIENT  DONOR N/A 2019    Procedure: TRANSPLANT, LIVER, RECIPIENT,  DONOR;  Surgeon: Александр Ramos MD;  Location: [facility] OR     VASCULAR SURGERY         Family History:   Family History   Problem Relation Age of Onset     Prostate Cancer Maternal Grandfather      Substance Abuse Maternal Grandfather         Alcohol     Colon Cancer Father 60     Pancreatic Cancer Father 60     Prostate Cancer Father      Colorectal Cancer Father      Macular Degeneration Father      Cancer Father      Glaucoma Father      Skin Cancer Father      Colorectal Cancer Maternal Grandmother      Cancer Maternal Grandmother      Substance Abuse Maternal Grandmother         Alcohol     Colorectal Cancer Paternal Grandmother      Cancer Mother      Diabetes Mother          3/2016     Cerebrovascular Disease Mother         Passed away in Feb of this year, 80 years old.     Thyroid Disease Mother      Depression Mother      Asthma Sister         Had since birth     Thyroid Disease Sister      Depression Sister      Liver Disease No family hx of      Melanoma No family hx of          Social History:   Social History     Tobacco Use     Smoking status: Never Smoker     Smokeless tobacco: Former User     Types: Chew     Tobacco comment: 1 tin per week   Substance Use Topics     Alcohol use: No     Alcohol/week: 0.0 standard drinks     Comment: quit 1996     Drug use: No        Physical Exam:  /66   Pulse 91   Temp 97.6  F (36.4  C)   Wt 74.9 kg (165 lb 3.2 oz)   SpO2 100%   BMI 24.40 kg/m     GA: anxious but in NAD  HEENT: no scleral icterus, no cervical LAD  CV: regular, no rub, no JVD, no edema  PULM: Lungs CTA B  GI: abd soft, NT, ND  : no CVA tenderness  MSK: no joint effusions  SKIN: no concerning rash  NEURO: no gross focal deficit  PSYCH: anxious    Laboratory:  CMP  Recent Labs   Lab Test 19  1034 19  0844 19  0905 19  0835   * 135 136 140    POTASSIUM 5.5* 5.1 5.2 4.0   CHLORIDE 106 110* 112* 114*   CO2 14* 18* 18* 21   ANIONGAP 11 7 7 5   * 51* 58* 129*   BUN 86* 47* 40* 25   CR 3.17* 1.88* 1.81* 1.33*   GFRESTIMATED 21* 39* 41* 59*   GFRESTBLACK 24* 45* 47* 69   DEBORAH 9.4 9.9 9.6 8.0*   MAG 2.4* 2.2 2.2 2.5*   PHOS 4.7* 4.0 3.6 3.6   PROTTOTAL 7.5 7.7 7.1 6.0*   ALBUMIN 3.0* 3.0* 2.7* 2.4*   BILITOTAL 0.5 0.7 0.6 0.8   ALKPHOS 185* 214* 229* 210*   AST 16 24 28 21   ALT 26 33 32 53     CBC  Recent Labs   Lab Test 12/02/19  1034 11/27/19  0844 11/25/19  0905 11/21/19  0835   HGB 7.6* 9.6* 9.6* 9.6*   WBC 6.7 7.6 7.3 7.4   RBC 2.41* 3.07* 3.04* 3.08*   HCT 23.4* 29.2* 29.1* 29.5*   MCV 97 95 96 96   MCH 31.5 31.3 31.6 31.2   MCHC 32.5 32.9 33.0 32.5   RDW 19.9* 19.5* 19.8* 18.0*    222 187 129*     INR  Recent Labs   Lab Test 11/16/19  0648 11/15/19  0449 11/14/19  0551 11/14/19  0018  11/12/19  1400  11/12/19  0346  11/11/19  1651 11/11/19  1600   INR 1.11 1.12 1.19* 1.24*   < > 1.46*   < > 1.47*   < >  --  1.60*   PTT  --   --   --   --   --  39*  --  57*  --  48* 48*    < > = values in this interval not displayed.     ABG  Recent Labs   Lab Test 11/12/19  1513 11/12/19  1400 11/11/19  1735 11/11/19  1651 11/11/19  1600   PH 7.37 7.36  --  7.41 7.48*   PCO2 42 43  --  42 36   PO2 114* 92  --  81 98   HCO3 24 24  --  27 26   O2PER 44% 41% 30 32 32.0      URINE STUDIES  Recent Labs   Lab Test 11/15/19  1123 07/08/19  1017 02/04/19  0705 01/25/19  0520   COLOR Light Yellow Yellow Yellow Yellow   APPEARANCE Clear Clear Clear Clear   URINEGLC 300* >499* 150* >1000*   URINEBILI Negative Negative Negative Negative   URINEKETONE Negative Negative Negative Negative   SG 1.007 1.017 1.016 1.020   UBLD Small* Negative Negative Negative   URINEPH 6.5 5.0 5.0 5.0   PROTEIN Negative Negative Negative Negative   NITRITE Negative Negative Negative Negative   LEUKEST Negative Negative Negative Negative   RBCU 0 <1 1 <1   WBCU <1 3 1 <1     Recent Labs    Lab Test 07/08/19  1017 02/04/19  0705   UTPG 0.11 0.14     PTH  Recent Labs   Lab Test 07/08/19  1020   PTHI 54     IRON STUDIES   Recent Labs   Lab Test 12/28/18  1108 09/15/18  1215 06/09/17  1250   IRON  --  52  --    FEB  --  403  --    IRONSAT  --  13*  --    QUAN 90 10* 450*       Imaging:     Scribe Disclosure:   IGa, am serving as a scribe to document services personally performed by Eloy Rao MD at this visit, based upon the provider's statements to me. All documentation has been reviewed by the aforementioned provider prior to being entered into the official medical record.     IGa, served as a scribe preparing the chart for the clinic encounter through chart review for the provider team.     Total time spent was >60 minutes, and more than 50% of face to face time was spent in counseling and/or coordination of care regarding principles of multidisciplinary care, medication management, and JERONIMO and chronic kidney disease education.  Eloy Rao MD

## 2019-12-02 NOTE — PROGRESS NOTES
[facility]   Patient is currently open to home care services with [facility].  The patient is currently receiving RN/PT/OT services.  [facility]  and team have been notified of patient admission.  [facility] liaison will continue to follow patient during stay.  If appropriate provide orders to resume home care at time of discharge.    Thank you  Gayle Jones RN, BSN  [facility] Liaison  [facility]  [phone]

## 2019-12-02 NOTE — PATIENT INSTRUCTIONS
-discontinue amlodipine (norvasc)  -decrease carvedilol to 12.5 mg 2x/day (1/2 tab 2x/day)  -we will attempt to get you intravenous fluid  -return to clinic in 6 months

## 2019-12-02 NOTE — TELEPHONE ENCOUNTER
"Discussed pt's increase creatinine with Александр Ramos. He stated patient needs to be admitted and \"cancel his appt this afternoon  Patient can get taken care of inpatient. \"    Spoke with Eloy Rao that dr Lane wants patient to be admitted.    Admissions is working on admit.   "

## 2019-12-02 NOTE — NURSING NOTE
"Chief Complaint   Patient presents with     RECHECK     Follow Up       Vital signs:  Temp: 97.6  F (36.4  C)   BP: 102/66 Pulse: 91     SpO2: 100 %       Weight: 74.9 kg (165 lb 3.2 oz)  Estimated body mass index is 24.4 kg/m  as calculated from the following:    Height as of 10/28/19: 1.753 m (5' 9\").    Weight as of this encounter: 74.9 kg (165 lb 3.2 oz).       Maggie Lewis, CMA    "

## 2019-12-02 NOTE — LETTER
"12/2/2019       RE: Frandy Workman  [address] [facility] [address]     Dear Colleague,    Thank you for referring your patient, Frandy Workman, to the [facility] ENDOCRINOLOGY at [facility]. Please see a copy of my visit note below.    [facility] End[facility]ology- Outpatient Diabetes Follow up    Miller is a 54 year old male with TIIDM complicated by peripheral neuropathy and retinopathy, cirrhosis secondary to ESTRADA, HCC, HTN, HLD, GERD, BPH, who is s/p DBD liver transplant on 11/11/19. He is following up post hospitalization.   The inpatient Diabetes service was consulted in his hospitalization. He temporarily required enteral feeding while hospitalized. His appetite remains poor post hospital discharge.     Recommendations at time of discharge were for patient to resume Tresiba at lower than PTA dose (55 units). He was instructed to resume his PTA mealtime/bolus insulin as per the previously input targets on his meter. He utilizes an [employer] expert meter which provides bolus recommendations based upon previously input settings. He was to hold Metformin and Farxiga for the time being.     There was some confusion upon discharge with regard to bolus dosing. He reports he was told not to take \"fast acting insulin\" on discharge.   He has had frequent episodes of hypoglycemia, related to poor PO intake and skipping meals.  His caregiver went back to [place], and he is worried about his ability to care for himself.   Upon transplant follow up 11/26 with PCP/[facility] pharmacist, they reduced his Tresiba to 36 units daily, and asked him to hold sliding scale insulin.   Was discussed with Dr. Wilcox 11/27 with continued lows: recommended reduction in Tresiba to 15 units daily (and to hold dosing for one day). His bolus insulin was also reduced to 1:10g CHO and 1/40>140.     Upon follow up with [facility] pharmacist- he reported he was taking 1 unit per 40 grams CHO (incorrect)    His " renal function is significantly worse today; nephrologist (Dr. Rao) believes related to hypovolemia and tacro.   Notes state he is to be admitted today for evaluation and treatment. Confirmed with Dr. Rao this afternoon.  He has lost 7 lbs since his hospital discharge.    Miller is present today with a caregiver who has been assisting him. He is very confused. He told me he took 15 units of Tresiba this morning. He told his caregiver earlier this morning he did not take any medications or insulin before she arrived. He told [facility] pharmacist today that he took 36 units of Tresiba this morning. He told me he was still taking 1:5 g CHO for carb coverage. His caregiver is worried that he is giving himself inappropriate amounts of insulin. She states that he will bolus at the start of a meal, but lately has not been finishing meals. He told me his meter was not adjusted for change in daylight savings. His caregiver reminded him he changed this. I showed him that he did (time was accurate).     Miller initially did not remember that he met with other providers today, and seemed concerned with my discussion about him going to the hospital. His caregiver reoriented/reminded him of all this and he subsequently recalled conversations. He asked who would be seeing him in the hospital and why he was going. He wanted to know if his confusion was related to his surgery.     He reports not having eaten anything today. His caregiver stated that he ate breakfast and administered insulin for it. BG checked in our clinic and was 157. He was preparing to eat a turkey burger. He asked me if we could resume Metformin and Farxiga today.    Pt denies headaches, visual changes, n/v, SOB at rest, chest pain, abd pain, nausea, diarrhea, dysuria, hematuria or foot ulcers.  He had been having some nausea and vomiting, felt related to CellCept.    Current Diabetes Regimen: unknown, with multiple regimen adjustments since discharge by multiple  "providers, as well as patient's confusion.  Tresiba (to best of my knowledge/investigation, 15 units daily, last taken 12/2 in the morning)- he tells me its actually 14 units, as Tresiba pen goes up by 2's. (U-200 pen?)   Novolog- patient continued to report 1:5g CHO was his current dose   HOLDING Metformin and Farxiga post surgery.    Glucometer Settings  Glucometer type: Accucheck jo ann expert (not camilo flash as has been previously documented)  Settings: \"meal rise\"- 50mg/dL. Snack size-10g, active insulin time 3 hrs.  Checking 2 times per day on average   Average glucose: 126  SD: 58  Pattern of hypoglycemia from 530-0800 daily (50-80)  Highest -170's between 1100-1230PM  Drops again consistently from 2130-midnight ()    Weight: 165, compared to recent of 172 lbs.   Physical Activity: poor  Nutrition: poor, no appetite     Diabetes Care  Retinopathy: no     Nephropathy: BP low in clinic today. + Hx microalbuminuria. Creatinine 3.17(stable). Taking ACEi/ARB: no (renal dysfunction)    Neuropathy: yes    Foot Exam: not done today.     Lipids: Taking ASA: yes, statin: yes  LDL Cholesterol Calculated   Date Value Ref Range Status   02/04/2019 81 <100 mg/dL Final     Comment:     Desirable:       <100 mg/dl      ROS: 10 point review of systems completed; pertinent positives and negatives documented in HPI    Allergies  Allergies   Allergen Reactions     Codeine Other (See Comments)     Cannot take due to liver  Cannot tolerate oral narcotics     Seasonal Allergies      Sneezing, coughing, runny and itchy eyes       Medications  Current Outpatient Medications   Medication Sig Dispense Refill     alpha-lipoic acid 600 MG capsule Take 1 capsule (600 mg) by mouth daily 90 capsule 3     Artificial Tear Solution (SM ARTIFICIAL TEARS) SOLN Place 1 drop into the right eye every hour as needed Apply at least 4 times daily and as needed for dry eye 1 Bottle 3     aspirin (ASA) 325 MG EC tablet Take 1 tablet (325 mg) " by mouth daily 30 tablet 3     BD VIKTORIA U/F 32G X 4 MM insulin pen needle Use 5 per day 300 each 3     blood glucose (ACCU-CHEK EDINSON PLUS) test strip USE TO TEST FOUR TIMES DAILY OR AS DIRECTED 400 strip 6     blood glucose monitoring (ACCU-CHEK EDINSON PLUS) test strip Use to test blood sugar 4 times daily 400 each 3     blood glucose monitoring (ACCU-CHEK FASTCLIX) lancets Use to test blood sugar 4 times daily or as directed.  1 box = 102 lancets 408 each 3     carvedilol (COREG) 25 MG tablet Take 0.5 tablets (12.5 mg) by mouth 2 times daily (with meals) 60 tablet 3     Cyanocobalamin 5000 MCG TBDP Take 5,000 mcg by mouth daily       econazole nitrate 1 % external cream Apply topically daily To feet and toenails. 85 g 0     ferrous sulfate (FEROSUL) 325 (65 Fe) MG tablet Take 1 tablet (325 mg) by mouth 3 times daily (with meals) 90 tablet 3     glucose (BD GLUCOSE) 4 g chewable tablet Take 1 tablet by mouth every hour as needed for low blood sugar 60 tablet 1     insulin aspart (NOVOLOG PEN) 100 UNIT/ML pen Inject 1-7 Units Subcutaneous 3 times daily (with meals) DOSE:  1 units per 5 grams of carbohydrate.  Only chart total amount of units given.  Do not give if pre-prandial glucose is less than 60 mg/dL. If given at mealtime, administer within 30 minutes of start of meal 3 mL 3     insulin aspart (NOVOLOG PEN) 100 UNIT/ML pen Inject 1-7 Units Subcutaneous Take with snacks or supplements for high blood sugar DOSE:  1 units per 5 grams of carbohydrate. Only chart total amount of units given.  Do not give if pre-prandial glucose is less than 60 mg/dL. If given at mealtime, administer within 30 minutes of start of meal       insulin aspart (NOVOLOG PEN) 100 UNIT/ML pen Inject 1-10 Units Subcutaneous 3 times daily (with meals) Correction Scale - custom DOSING     Do Not give Correction Insulin if Pre-Meal BG less than 140    to 159 give 1 units.    to 179 give 2 units.    to 199 give 3 units.     to 219 give 4 units.    to 239 give 5 units.    to 259 give 6 units.    to 279 give 7 units.    to 299 give 8 units.    to 319 give 9 units.    to 339 give 10 units.   To be given with prandial insulin, and based on pre-meal blood glucose.   Notify provider if glucose greater than or equal 340 mg/dL after administration. If given at mealtime, administer within 30 minutes of start of meal 3 mL      insulin aspart (NOVOLOG PEN) 100 UNIT/ML pen Inject 1-11 Units Subcutaneous At Bedtime Correction Scale - custom DOSING (1:20)   to 219 give 1 units.    to 239 give 2 units.    to 259 give 3 units.    to 279 give 4 units.    to 299 give 5 units.    to 319 give 6 units.    to 339 give 7 units.    to 359 give 8 units    to 379 give 9 units.   to 399 give 10 units.  BG >/=400 give 11 units.  Notify provider if glucose greater than or equal to 400 mg/dL after administration. If given at mealtime, administer within 30 minutes of start of meal       insulin degludec (TRESIBA) 200 UNIT/ML pen Inject 15 Units Subcutaneous daily  100 mL 3     loperamide (IMODIUM) 2 MG capsule Take 1 capsule (2 mg) by mouth 4 times daily as needed for diarrhea 20 capsule 1     Multiple Vitamin (THERAVITE PO) Take 1 tablet by mouth every morning        mycophenolic acid (GENERIC EQUIVALENT) 180 MG EC tablet Take 3 tablets (540 mg) by mouth every 12 hours 180 tablet 3     omeprazole (PRILOSEC) 40 MG DR capsule Take 1 capsule (40 mg) by mouth daily 90 capsule 2     ondansetron (ZOFRAN-ODT) 4 MG ODT tab Take 1 tablet (4 mg) by mouth every 6 hours as needed for nausea or vomiting 10 tablet 0     order for DME Equipment being ordered: Cane ()  Treatment Diagnosis: impaired gait 1 each 0     oxyCODONE (ROXICODONE) 5 MG tablet Take 1 tablet (5 mg) by mouth every 6 hours as needed for moderate to severe pain 20 tablet 0     rosuvastatin (CRESTOR) 5 MG tablet  Take 1 tablet (5 mg) by mouth daily 30 tablet 3     sulfamethoxazole-trimethoprim (BACTRIM/SEPTRA) 400-80 MG tablet Take 1 tablet by mouth daily 30 tablet 11     tacrolimus (GENERIC EQUIVALENT) 1 MG capsule Take 2 capsules (2 mg) by mouth 2 times daily 120 capsule 11     tamsulosin (FLOMAX) 0.4 MG capsule TAKE 1 CAPSULE(0.4 MG) BY MOUTH DAILY 90 capsule 3     tenofovir (VIREAD) 300 MG tablet Take 1 tablet (300 mg) by mouth daily 30 tablet 11     traZODone (DESYREL) 50 MG tablet Take 1 tablet (50 mg) by mouth At Bedtime 90 tablet 1     valGANciclovir (VALCYTE) 450 MG tablet Take 1 tablet (450 mg) by mouth daily 30 tablet 5     vitamin D3 (CHOLECALCIFEROL) 2000 units (50 mcg) tablet Take 1 tablet by mouth daily         Family History  family history includes Asthma in his sister; Cancer in his father, maternal grandmother, and mother; Cerebrovascular Disease in his mother; Colon Cancer (age of onset: 60) in his father; Colorectal Cancer in his father, maternal grandmother, and paternal grandmother; Depression in his mother and sister; Diabetes in his mother; Glaucoma in his father; Macular Degeneration in his father; Pancreatic Cancer (age of onset: 60) in his father; Prostate Cancer in his father and maternal grandfather; Skin Cancer in his father; Substance Abuse in his maternal grandfather and maternal grandmother; Thyroid Disease in his mother and sister.    Social History   reports that he has never smoked. He quit smokeless tobacco use about 2 years ago.  His smokeless tobacco use included chew. He reports that he does not drink alcohol or use drugs.     Past Medical History  Past Medical History:   Diagnosis Date     Anemia 2013    Low blood plates current is 37     Arthritis      BPH (benign prostatic hyperplasia)      CAD (coronary artery disease) 4/1/2019     Cholelithiasis      Conductive hearing loss 8/16/2017    Have a lump on my right side of my face.  Had wax discharge     Depressive disorder 1986     Suffer effects throughout life     Gastroesophageal reflux disease 12/1/2014    Being treated with Prilosac     HCC (hepatocellular carcinoma) (H) 1/22/2019     History of diabetic retinopathy 07/2018     HTN (hypertension)      HTN (hypertension) 11/20/2019     Hyperlipidemia      Liver cirrhosis secondary to ESTRADA (H)      Portal vein thrombosis 4/11/2019     Type II diabetes mellitus (H)     Insulin adminstered BID daily.        Past Surgical History:   Procedure Laterality Date     COLONOSCOPY      2015     CV HEART CATHETERIZATION WITH POSSIBLE INTERVENTION N/A 2/26/2019    Procedure: CORS;  Surgeon: Jagdish Hoyt MD;  Location:  HEART CARDIAC CATH LAB     ESOPHAGOSCOPY, GASTROSCOPY, DUODENOSCOPY (EGD), COMBINED N/A 11/17/2016    Procedure: COMBINED ESOPHAGOSCOPY, GASTROSCOPY, DUODENOSCOPY (EGD);  Surgeon: Santi Rosas MD;  Location:  GI     ESOPHAGOSCOPY, GASTROSCOPY, DUODENOSCOPY (EGD), COMBINED N/A 11/17/2017    Procedure: COMBINED ESOPHAGOSCOPY, GASTROSCOPY, DUODENOSCOPY (EGD);  EGD;  Surgeon: Santi Rosas MD;  Location:  GI     ESOPHAGOSCOPY, GASTROSCOPY, DUODENOSCOPY (EGD), COMBINED N/A 12/28/2018    Procedure: EGD;  Surgeon: Santi Rosas MD;  Location:  OR     HEAD & NECK SURGERY      12/2017 at [facility].      IMPLANT GOLD WEIGHT EYELID Right 11/16/2017    Procedure: IMPLANT WEIGHT EYELID;  Right Upper Eyelid Weight, right tarsal strip lower eyelid;  Surgeon: Milana Malave MD;  Location:  OR     IR CHEMO EMBOLIZATION  1/22/2019     KNEE SURGERY Left      ORTHOPEDIC SURGERY       PAROTIDECTOMY, RADICAL NECK DISSECTION Right 11/2/2017    Procedure: PAROTIDECTOMY, RADICAL NECK DISSECTION;  Right Superfacial Parotidectomy , Facial nerve repair. with [facility] facial nerve monitor.;  Surgeon: Asiya Morgan MD;  Location: [facility] OR     PICC INSERTION Left 11/06/2017    4fr SL BioFlo PICC, 44cm in the L basilic vein w/ tip in the low SVC     RETURN LIVER  "TRANSPLANT N/A 2019    Procedure: Exploratory laparotomy, hematoma evacuation, abdominal washout;  Surgeon: Александр Ramos MD;  Location: [facility] OR     TRANSPLANT LIVER RECIPIENT  DONOR N/A 2019    Procedure: TRANSPLANT, LIVER, RECIPIENT,  DONOR;  Surgeon: Александр Ramos MD;  Location: [facility] OR     VASCULAR SURGERY         Physical Exam  /66   Pulse 91   Ht 1.753 m (5' 9\")   Wt 74.8 kg (165 lb)   BMI 24.37 kg/m     Body mass index is 24.37 kg/m .    GENERAL : In no apparent distress. Confused to situation and aspects of medical care  SKIN: Normal color, normal temperature, texture.  No  suspicious lesions or rashes  EYES: PERRLA.  No proptosis.  MOUTH: Moist, pink; pharynx clear  NECK: No visible masses. No palpable adenopathy, or masses. No goiter.  RESP: normal respiratory effort.  cough  ABDOMEN: soft, nontender to palpation   NEURO: awake, alert, responds appropriately to questions.  Moves all extremities; Gait normal.     EXTREMITIES: No ulcers or open wounds.     RESULTS    Lab Results   Component Value Date    A1C 6.6 2018    A1C 6.5 2017    A1C 7.8 10/25/2016       Creatinine   Date Value Ref Range Status   2019 3.17 (H) 0.66 - 1.25 mg/dL Final     GFR Estimate   Date Value Ref Range Status   2019 21 (L) >60 mL/min/[1.73_m2] Final     Comment:     Non  GFR Calc  Starting 2018, serum creatinine based estimated GFR (eGFR) will be   calculated using the Chronic Kidney Disease Epidemiology Collaboration   (CKD-EPI) equation.       Hemoglobin A1C   Date Value Ref Range Status   2018 6.6 (H) 0 - 5.6 % Final     Comment:     Normal <5.7% Prediabetes 5.7-6.4%  Diabetes 6.5% or higher - adopted from ADA   consensus guidelines.       Potassium   Date Value Ref Range Status   2019 5.5 (H) 3.4 - 5.3 mmol/L Final     ALT   Date Value Ref Range Status   2019 26 0 - 70 U/L Final     AST   Date Value Ref Range Status "   12/02/2019 16 0 - 45 U/L Final     TSH   Date Value Ref Range Status   08/08/2018 0.49 0.40 - 4.00 mU/L Final     T4 Free   Date Value Ref Range Status   08/08/2018 1.21 0.76 - 1.46 ng/dL Final       TSH   Date Value Ref Range Status   08/08/2018 0.49 0.40 - 4.00 mU/L Final   02/08/2018 0.71 0.40 - 4.00 mU/L Final   06/09/2017 0.42 0.40 - 4.00 mU/L Final     T4 Free   Date Value Ref Range Status   08/08/2018 1.21 0.76 - 1.46 ng/dL Final       Creatinine   Date Value Ref Range Status   12/02/2019 3.17 (H) 0.66 - 1.25 mg/dL Final       Recent Labs   Lab Test 02/04/19  0649 08/08/18  1246   CHOL 131 133   HDL 26* 30*   LDL 81 68   TRIG 117 172*     No results found for: [id number], [id number], [id number]    ASSESSMENT/PLAN:    1. Diabetes type II,  uncontrolled, with hypoglycemia, peripheral neuropathy   -most recent A1c 5.1%   -he is having frequent hypoglycemic events; multifactorial, including poor PO intake, taking full Novolog dose for a meal but then not finishing meal, frequent insulin dose adjustments with subsequent confusion, acute renal failure with altered mentation   -please consult inpatient diabetes service upon admission for further management of BG while hospitalized.    -on admission: hold long acting insulin upon admission, we will substitute Lantus for his PTA Tresiba and resume when appropriate   -on admission: initiate Novolog 1:10g CHO with meals and snacks   -on admission: initiate Novolog moderate intensity sliding scale before meals and at bedtime.       2. Renal dysfunction with microalbuminuria: Cr 3.17 on labs drawn today, with K+ 5.5   -he followed up with Dr. Rao this morning; planning admission for further evaluation.    Follow up:  1. With [facility] with Dr. Espinoza as previously planned   2. Diabetes Educator follow up: will plan for while patient is hospitalized.     The patient is enrolled in [employer] services    This patient has met [place] community measures for diabetes control:  no (D5: Control blood pressure ,Lower bad cholesterol Maintain blood sugar, Be tobacco-free, Take aspirin as recommended)    This patient is eligible for graduation from [facility] Endocrinology clinic: no    I spent 25 minutes with this patient face to face and explained the conditions and plans (more than 50% of time was counseling/coordination of care, diabetes management, follow up plan for worsening hyper and hypoglycemia) . The patient understood and is satisfied with today's visit.     ELIO Mari, PA-C  [facility]   Pager [phone]

## 2019-12-02 NOTE — PROGRESS NOTES
"[facility] Endocrinology- Outpatient Diabetes Follow up    Miller is a 54 year old male with TIIDM complicated by peripheral neuropathy and retinopathy, cirrhosis secondary to ESTRADA, HCC, HTN, HLD, GERD, BPH, who is s/p DBD liver transplant on 11/11/19. He is following up post hospitalization.   The inpatient Diabetes service was consulted in his hospitalization. He temporarily required enteral feeding while hospitalized. His appetite remains poor post hospital discharge.     Recommendations at time of discharge were for patient to resume Tresiba at lower than PTA dose (55 units). He was instructed to resume his PTA mealtime/bolus insulin as per the previously input targets on his meter. He utilizes an [employer] Fidelia expert meter which provides bolus recommendations based upon previously input settings. He was to hold Metformin and Farxiga for the time being.     There was some confusion upon discharge with regard to bolus dosing. He reports he was told not to take \"fast acting insulin\" on discharge.   He has had frequent episodes of hypoglycemia, related to poor PO intake and skipping meals.  His caregiver went back to [place], and he is worried about his ability to care for himself.   Upon transplant follow up 11/26 with PCP/[facility] pharmacist, they reduced his Tresiba to 36 units daily, and asked him to hold sliding scale insulin.   Was discussed with Dr. Wilcox 11/27 with continued lows: recommended reduction in Tresiba to 15 units daily (and to hold dosing for one day). His bolus insulin was also reduced to 1:10g CHO and 1/40>140.     Upon follow up with [facility] pharmacist- he reported he was taking 1 unit per 40 grams CHO (incorrect)    His renal function is significantly worse today; nephrologist (Dr. Rao) believes related to hypovolemia and tacro.   Notes state he is to be admitted today for evaluation and treatment. Confirmed with Dr. Rao this afternoon.  He has lost 7 lbs since his hospital " discharge.    Miller is present today with a caregiver who has been assisting him. He is very confused. He told me he took 15 units of Tresiba this morning. He told his caregiver earlier this morning he did not take any medications or insulin before she arrived. He told [facility] pharmacist today that he took 36 units of Tresiba this morning. He told me he was still taking 1:5 g CHO for carb coverage. His caregiver is worried that he is giving himself inappropriate amounts of insulin. She states that he will bolus at the start of a meal, but lately has not been finishing meals. He told me his meter was not adjusted for change in daylight savings. His caregiver reminded him he changed this. I showed him that he did (time was accurate).     Miller initially did not remember that he met with other providers today, and seemed concerned with my discussion about him going to the hospital. His caregiver reoriented/reminded him of all this and he subsequently recalled conversations. He asked who would be seeing him in the hospital and why he was going. He wanted to know if his confusion was related to his surgery.     He reports not having eaten anything today. His caregiver stated that he ate breakfast and administered insulin for it. BG checked in our clinic and was 157. He was preparing to eat a turkey burger. He asked me if we could resume Metformin and Farxiga today.    Pt denies headaches, visual changes, n/v, SOB at rest, chest pain, abd pain, nausea, diarrhea, dysuria, hematuria or foot ulcers.  He had been having some nausea and vomiting, felt related to CellCept.    Current Diabetes Regimen: unknown, with multiple regimen adjustments since discharge by multiple providers, as well as patient's confusion.  Tresiba (to best of my knowledge/investigation, 15 units daily, last taken 12/2 in the morning)- he tells me its actually 14 units, as Tresiba pen goes up by 2's. (U-200 pen?)   Novolog- patient continued to report 1:5g CHO  "was his current dose   HOLDING Metformin and Farxiga post surgery.    Glucometer Settings  Glucometer type: Accucheck edinson expert (not camiol flash as has been previously documented)  Settings: \"meal rise\"- 50mg/dL. Snack size-10g, active insulin time 3 hrs.  Checking 2 times per day on average   Average glucose: 126  SD: 58  Pattern of hypoglycemia from 530-0800 daily (50-80)  Highest -170's between 1100-1230PM  Drops again consistently from 2130-midnight ()    Weight: 165, compared to recent of 172 lbs.   Physical Activity: poor  Nutrition: poor, no appetite     Diabetes Care  Retinopathy: no     Nephropathy: BP low in clinic today. + Hx microalbuminuria. Creatinine 3.17(stable). Taking ACEi/ARB: no (renal dysfunction)    Neuropathy: yes    Foot Exam: not done today.     Lipids: Taking ASA: yes, statin: yes  LDL Cholesterol Calculated   Date Value Ref Range Status   02/04/2019 81 <100 mg/dL Final     Comment:     Desirable:       <100 mg/dl      ROS: 10 point review of systems completed; pertinent positives and negatives documented in HPI    Allergies  Allergies   Allergen Reactions     Codeine Other (See Comments)     Cannot take due to liver  Cannot tolerate oral narcotics     Seasonal Allergies      Sneezing, coughing, runny and itchy eyes       Medications  Current Outpatient Medications   Medication Sig Dispense Refill     alpha-lipoic acid 600 MG capsule Take 1 capsule (600 mg) by mouth daily 90 capsule 3     Artificial Tear Solution (SM ARTIFICIAL TEARS) SOLN Place 1 drop into the right eye every hour as needed Apply at least 4 times daily and as needed for dry eye 1 Bottle 3     aspirin (ASA) 325 MG EC tablet Take 1 tablet (325 mg) by mouth daily 30 tablet 3     BD VIKTORIA U/F 32G X 4 MM insulin pen needle Use 5 per day 300 each 3     blood glucose (ACCU-CHEK EDINSON PLUS) test strip USE TO TEST FOUR TIMES DAILY OR AS DIRECTED 400 strip 6     blood glucose monitoring (ACCU-CHEK EDINSON PLUS) test " strip Use to test blood sugar 4 times daily 400 each 3     blood glucose monitoring (ACCU-CHEK FASTCLIX) lancets Use to test blood sugar 4 times daily or as directed.  1 box = 102 lancets 408 each 3     carvedilol (COREG) 25 MG tablet Take 0.5 tablets (12.5 mg) by mouth 2 times daily (with meals) 60 tablet 3     Cyanocobalamin 5000 MCG TBDP Take 5,000 mcg by mouth daily       econazole nitrate 1 % external cream Apply topically daily To feet and toenails. 85 g 0     ferrous sulfate (FEROSUL) 325 (65 Fe) MG tablet Take 1 tablet (325 mg) by mouth 3 times daily (with meals) 90 tablet 3     glucose (BD GLUCOSE) 4 g chewable tablet Take 1 tablet by mouth every hour as needed for low blood sugar 60 tablet 1     insulin aspart (NOVOLOG PEN) 100 UNIT/ML pen Inject 1-7 Units Subcutaneous 3 times daily (with meals) DOSE:  1 units per 5 grams of carbohydrate.  Only chart total amount of units given.  Do not give if pre-prandial glucose is less than 60 mg/dL. If given at mealtime, administer within 30 minutes of start of meal 3 mL 3     insulin aspart (NOVOLOG PEN) 100 UNIT/ML pen Inject 1-7 Units Subcutaneous Take with snacks or supplements for high blood sugar DOSE:  1 units per 5 grams of carbohydrate. Only chart total amount of units given.  Do not give if pre-prandial glucose is less than 60 mg/dL. If given at mealtime, administer within 30 minutes of start of meal       insulin aspart (NOVOLOG PEN) 100 UNIT/ML pen Inject 1-10 Units Subcutaneous 3 times daily (with meals) Correction Scale - custom DOSING     Do Not give Correction Insulin if Pre-Meal BG less than 140    to 159 give 1 units.    to 179 give 2 units.    to 199 give 3 units.    to 219 give 4 units.    to 239 give 5 units.    to 259 give 6 units.    to 279 give 7 units.    to 299 give 8 units.    to 319 give 9 units.    to 339 give 10 units.   To be given with prandial insulin, and based on pre-meal  blood glucose.   Notify provider if glucose greater than or equal 340 mg/dL after administration. If given at mealtime, administer within 30 minutes of start of meal 3 mL      insulin aspart (NOVOLOG PEN) 100 UNIT/ML pen Inject 1-11 Units Subcutaneous At Bedtime Correction Scale - custom DOSING (1:20)   to 219 give 1 units.    to 239 give 2 units.    to 259 give 3 units.    to 279 give 4 units.    to 299 give 5 units.    to 319 give 6 units.    to 339 give 7 units.    to 359 give 8 units    to 379 give 9 units.   to 399 give 10 units.  BG >/=400 give 11 units.  Notify provider if glucose greater than or equal to 400 mg/dL after administration. If given at mealtime, administer within 30 minutes of start of meal       insulin degludec (TRESIBA) 200 UNIT/ML pen Inject 15 Units Subcutaneous daily  100 mL 3     loperamide (IMODIUM) 2 MG capsule Take 1 capsule (2 mg) by mouth 4 times daily as needed for diarrhea 20 capsule 1     Multiple Vitamin (THERAVITE PO) Take 1 tablet by mouth every morning        mycophenolic acid (GENERIC EQUIVALENT) 180 MG EC tablet Take 3 tablets (540 mg) by mouth every 12 hours 180 tablet 3     omeprazole (PRILOSEC) 40 MG DR capsule Take 1 capsule (40 mg) by mouth daily 90 capsule 2     ondansetron (ZOFRAN-ODT) 4 MG ODT tab Take 1 tablet (4 mg) by mouth every 6 hours as needed for nausea or vomiting 10 tablet 0     order for DME Equipment being ordered: Cane ()  Treatment Diagnosis: impaired gait 1 each 0     oxyCODONE (ROXICODONE) 5 MG tablet Take 1 tablet (5 mg) by mouth every 6 hours as needed for moderate to severe pain 20 tablet 0     rosuvastatin (CRESTOR) 5 MG tablet Take 1 tablet (5 mg) by mouth daily 30 tablet 3     sulfamethoxazole-trimethoprim (BACTRIM/SEPTRA) 400-80 MG tablet Take 1 tablet by mouth daily 30 tablet 11     tacrolimus (GENERIC EQUIVALENT) 1 MG capsule Take 2 capsules (2 mg) by mouth 2 times daily 120  capsule 11     tamsulosin (FLOMAX) 0.4 MG capsule TAKE 1 CAPSULE(0.4 MG) BY MOUTH DAILY 90 capsule 3     tenofovir (VIREAD) 300 MG tablet Take 1 tablet (300 mg) by mouth daily 30 tablet 11     traZODone (DESYREL) 50 MG tablet Take 1 tablet (50 mg) by mouth At Bedtime 90 tablet 1     valGANciclovir (VALCYTE) 450 MG tablet Take 1 tablet (450 mg) by mouth daily 30 tablet 5     vitamin D3 (CHOLECALCIFEROL) 2000 units (50 mcg) tablet Take 1 tablet by mouth daily         Family History  family history includes Asthma in his sister; Cancer in his father, maternal grandmother, and mother; Cerebrovascular Disease in his mother; Colon Cancer (age of onset: 60) in his father; Colorectal Cancer in his father, maternal grandmother, and paternal grandmother; Depression in his mother and sister; Diabetes in his mother; Glaucoma in his father; Macular Degeneration in his father; Pancreatic Cancer (age of onset: 60) in his father; Prostate Cancer in his father and maternal grandfather; Skin Cancer in his father; Substance Abuse in his maternal grandfather and maternal grandmother; Thyroid Disease in his mother and sister.    Social History   reports that he has never smoked. He quit smokeless tobacco use about 2 years ago.  His smokeless tobacco use included chew. He reports that he does not drink alcohol or use drugs.     Past Medical History  Past Medical History:   Diagnosis Date     Anemia 2013    Low blood plates current is 37     Arthritis      BPH (benign prostatic hyperplasia)      CAD (coronary artery disease) 4/1/2019     Cholelithiasis      Conductive hearing loss 8/16/2017    Have a lump on my right side of my face.  Had wax discharge     Depressive disorder 1986    Suffer effects throughout life     Gastroesophageal reflux disease 12/1/2014    Being treated with Prilosac     HCC (hepatocellular carcinoma) (H) 1/22/2019     History of diabetic retinopathy 07/2018     HTN (hypertension)      HTN (hypertension) 11/20/2019      Hyperlipidemia      Liver cirrhosis secondary to ESTRADA (H)      Portal vein thrombosis 2019     Type II diabetes mellitus (H)     Insulin adminstered BID daily.        Past Surgical History:   Procedure Laterality Date     COLONOSCOPY           CV HEART CATHETERIZATION WITH POSSIBLE INTERVENTION N/A 2019    Procedure: CORS;  Surgeon: Jagdish Hoyt MD;  Location:  HEART CARDIAC CATH LAB     ESOPHAGOSCOPY, GASTROSCOPY, DUODENOSCOPY (EGD), COMBINED N/A 2016    Procedure: COMBINED ESOPHAGOSCOPY, GASTROSCOPY, DUODENOSCOPY (EGD);  Surgeon: Santi Rosas MD;  Location:  GI     ESOPHAGOSCOPY, GASTROSCOPY, DUODENOSCOPY (EGD), COMBINED N/A 2017    Procedure: COMBINED ESOPHAGOSCOPY, GASTROSCOPY, DUODENOSCOPY (EGD);  EGD;  Surgeon: Santi Rosas MD;  Location:  GI     ESOPHAGOSCOPY, GASTROSCOPY, DUODENOSCOPY (EGD), COMBINED N/A 2018    Procedure: EGD;  Surgeon: Santi Rosas MD;  Location:  OR     HEAD & NECK SURGERY      2017 at [facility].      IMPLANT GOLD WEIGHT EYELID Right 2017    Procedure: IMPLANT WEIGHT EYELID;  Right Upper Eyelid Weight, right tarsal strip lower eyelid;  Surgeon: Milana Malave MD;  Location:  OR     IR CHEMO EMBOLIZATION  2019     KNEE SURGERY Left      ORTHOPEDIC SURGERY       PAROTIDECTOMY, RADICAL NECK DISSECTION Right 2017    Procedure: PAROTIDECTOMY, RADICAL NECK DISSECTION;  Right Superfacial Parotidectomy , Facial nerve repair. with [facility] facial nerve monitor.;  Surgeon: Asiya Morgan MD;  Location: [facility] OR     PICC INSERTION Left 2017    4fr SL BioFlo PICC, 44cm in the L basilic vein w/ tip in the low SVC     RETURN LIVER TRANSPLANT N/A 2019    Procedure: Exploratory laparotomy, hematoma evacuation, abdominal washout;  Surgeon: Александр Ramos MD;  Location: [facility] OR     TRANSPLANT LIVER RECIPIENT  DONOR N/A 2019    Procedure: TRANSPLANT, LIVER, RECIPIENT,   "DONOR;  Surgeon: Александр Ramos MD;  Location: [facility] OR     VASCULAR SURGERY         Physical Exam  /66   Pulse 91   Ht 1.753 m (5' 9\")   Wt 74.8 kg (165 lb)   BMI 24.37 kg/m    Body mass index is 24.37 kg/m .    GENERAL : In no apparent distress. Confused to situation and aspects of medical care  SKIN: Normal color, normal temperature, texture.  No  suspicious lesions or rashes  EYES: PERRLA.  No proptosis.  MOUTH: Moist, pink; pharynx clear  NECK: No visible masses. No palpable adenopathy, or masses. No goiter.  RESP: normal respiratory effort.  cough  ABDOMEN: soft, nontender to palpation   NEURO: awake, alert, responds appropriately to questions.  Moves all extremities; Gait normal.     EXTREMITIES: No ulcers or open wounds.     RESULTS    Lab Results   Component Value Date    A1C 6.6 08/08/2018    A1C 6.5 06/09/2017    A1C 7.8 10/25/2016       Creatinine   Date Value Ref Range Status   12/02/2019 3.17 (H) 0.66 - 1.25 mg/dL Final     GFR Estimate   Date Value Ref Range Status   12/02/2019 21 (L) >60 mL/min/[1.73_m2] Final     Comment:     Non  GFR Calc  Starting 12/18/2018, serum creatinine based estimated GFR (eGFR) will be   calculated using the Chronic Kidney Disease Epidemiology Collaboration   (CKD-EPI) equation.       Hemoglobin A1C   Date Value Ref Range Status   08/08/2018 6.6 (H) 0 - 5.6 % Final     Comment:     Normal <5.7% Prediabetes 5.7-6.4%  Diabetes 6.5% or higher - adopted from ADA   consensus guidelines.       Potassium   Date Value Ref Range Status   12/02/2019 5.5 (H) 3.4 - 5.3 mmol/L Final     ALT   Date Value Ref Range Status   12/02/2019 26 0 - 70 U/L Final     AST   Date Value Ref Range Status   12/02/2019 16 0 - 45 U/L Final     TSH   Date Value Ref Range Status   08/08/2018 0.49 0.40 - 4.00 mU/L Final     T4 Free   Date Value Ref Range Status   08/08/2018 1.21 0.76 - 1.46 ng/dL Final       TSH   Date Value Ref Range Status   08/08/2018 0.49 0.40 - 4.00 mU/L " Final   02/08/2018 0.71 0.40 - 4.00 mU/L Final   06/09/2017 0.42 0.40 - 4.00 mU/L Final     T4 Free   Date Value Ref Range Status   08/08/2018 1.21 0.76 - 1.46 ng/dL Final       Creatinine   Date Value Ref Range Status   12/02/2019 3.17 (H) 0.66 - 1.25 mg/dL Final       Recent Labs   Lab Test 02/04/19  0649 08/08/18  1246   CHOL 131 133   HDL 26* 30*   LDL 81 68   TRIG 117 172*     No results found for: [id number], [id number], [id number]    ASSESSMENT/PLAN:    1. Diabetes type II,  uncontrolled, with hypoglycemia, peripheral neuropathy   -most recent A1c 5.1%   -he is having frequent hypoglycemic events; multifactorial, including poor PO intake, taking full Novolog dose for a meal but then not finishing meal, frequent insulin dose adjustments with subsequent confusion, acute renal failure with altered mentation   -please consult inpatient diabetes service upon admission for further management of BG while hospitalized.    -on admission: hold long acting insulin upon admission, we will substitute Lantus for his PTA Tresiba and resume when appropriate   -on admission: initiate Novolog 1:10g CHO with meals and snacks   -on admission: initiate Novolog moderate intensity sliding scale before meals and at bedtime.       2. Renal dysfunction with microalbuminuria: Cr 3.17 on labs drawn today, with K+ 5.5   -he followed up with Dr. Rao this morning; planning admission for further evaluation.    Follow up:  1. With [facility] with Dr. Espinoza as previously planned   2. Diabetes Educator follow up: will plan for while patient is hospitalized.     The patient is enrolled in [facility] services    This patient has met [place] community measures for diabetes control: no (D5: Control blood pressure ,Lower bad cholesterol Maintain blood sugar, Be tobacco-free, Take aspirin as recommended)    This patient is eligible for [facility] from [facility]: no    I spent 25 minutes with this patient face to face and explained  the conditions and plans (more than 50% of time was counseling/coordination of care, diabetes management, follow up plan for worsening hyper and hypoglycemia) . The patient understood and is satisfied with today's visit.     ELIO Mari, PAChakaC  [facility]   Pager [phone]

## 2019-12-02 NOTE — H&P
[facility] History and Physical    Frandy Workman MRN# [id number]   Age: 54 year old YOB: 1965     Date of Admission:  12/2/2019    Primary care provider: Nerissa Moe          Assessment and Plan:      Frandy Workman is a 54 year old male with PMHx significant for cirrhosis secondary to ESTRADA diagnosed in 2013, HCC 2018, HTN, HLD, GERD, BPH and DMII. S/p liver transplant 11/11/19 complicated by hematoma evacuation on POD 1. Now admitted with JERONIMO, nausea, diarrhea, confusion and weakness.        Graft function:   S/p liver transplant: DD OLT 11/11/19. LFTs stable.   Immunosuppression management:   CC: Myfortic 540 mg BID, changed on 12/2 due to nausea  FK: Tacrolimus 2 mg BID; goal 10-12 (12 hr trough). Last level 11/27 9.1.   Complexity of management: Medium  Hematology:   Stable.   Cardiorespiratory:   Stable on room air  Hypotensive: Discontinue Coreg and give IVF.   GI/Nutrition:   Non-severe malnutrition related to chronic disease: Minimal intake outpatient due to confusion, no appetite, food not tasting right, etc. Will place FT, consult dietician and start calorie counts.   Endocrine:   Type II diabetes mellitus: PTA was on Tresiba, carb coverage and sliding scale insulin. Hypoglycemia due to poor PO intake. Restarted sliding scale insulin and carb coverage. Hold Tresiba at this time. Consult endocrinology for further recommendations.   Fluid/Electrolytes:   JERONIMO: Likely due to decreased PO intake. Cr 3.2 from 1.9. Will give IVF and monitor.   Hyperkalemia: Give IVF and recheck.   Neuro:   Confusion: Continued confusion s/p transplant.   Infectious disease: Afebrile. WBC WNL.  Diarrhea: Worsening diarrhea over the past couple of days. Will send stool cultures.   Hep B positive donor: Continue PTA Tenofovir indefinitely.   Prophylaxis: DVT (mechanical), fall, GI (protonix), viral (Valcyte), pneumocystis (Bactrim)  Activity: OOB as much as possible and ambulate QID.   Psych:    Patient told RN that sometime he thinks about not taking medications to end his life. Will consult psychiatry for depression, altered mental status.   Disposition: 7A, PT/OT evaluate for home safety vs TCU          Chief Complaint:   JERONIMO, nausea, failure to thrive.     History is obtained from the patient         History of Present Illness:   This patient is a 54 year old male with PMHx significant for cirrhosis secondary to ESTRADA diagnosed in 2013, HCC 2018, HTN, HLD, GERD, BPH and DMII. S/p liver transplant 11/11/19 complicated by hematoma evacuation on POD 1. Now admitted with JERONIMO, nausea, diarrhea, confusion and weakness over the past week.     Patient's care giver left the state approximately one week ago and patient has PCA for 3 hours daily. PCA is present on admission and agrees patient is not safe at home.      Denies SOB, CP, dysphagia.          Past Medical History:     Past Medical History:   Diagnosis Date     Anemia 2013    Low blood plates current is 37     Arthritis      BPH (benign prostatic hyperplasia)      CAD (coronary artery disease) 4/1/2019     Cholelithiasis      Conductive hearing loss 8/16/2017    Have a lump on my right side of my face.  Had wax discharge     Depressive disorder 1986    Suffer effects throughout life     Gastroesophageal reflux disease 12/1/2014    Being treated with Prilosac     HCC (hepatocellular carcinoma) (H) 1/22/2019     History of diabetic retinopathy 07/2018     HTN (hypertension)      HTN (hypertension) 11/20/2019     Hyperlipidemia      Liver cirrhosis secondary to ESTRADA (H)      Portal vein thrombosis 4/11/2019     Type II diabetes mellitus (H)     Insulin adminstered BID daily.             Past Surgical History:     Past Surgical History:   Procedure Laterality Date     COLONOSCOPY      2015     CV HEART CATHETERIZATION WITH POSSIBLE INTERVENTION N/A 2/26/2019    Procedure: CORS;  Surgeon: Jagdish Hoyt MD;  Location:  HEART CARDIAC CATH LAB      ESOPHAGOSCOPY, GASTROSCOPY, DUODENOSCOPY (EGD), COMBINED N/A 2016    Procedure: COMBINED ESOPHAGOSCOPY, GASTROSCOPY, DUODENOSCOPY (EGD);  Surgeon: Santi Rosas MD;  Location:  GI     ESOPHAGOSCOPY, GASTROSCOPY, DUODENOSCOPY (EGD), COMBINED N/A 2017    Procedure: COMBINED ESOPHAGOSCOPY, GASTROSCOPY, DUODENOSCOPY (EGD);  EGD;  Surgeon: Santi Rosas MD;  Location:  GI     ESOPHAGOSCOPY, GASTROSCOPY, DUODENOSCOPY (EGD), COMBINED N/A 2018    Procedure: EGD;  Surgeon: Santi Rosas MD;  Location:  OR     HEAD & NECK SURGERY      2017 at [facility].      IMPLANT GOLD WEIGHT EYELID Right 2017    Procedure: IMPLANT WEIGHT EYELID;  Right Upper Eyelid Weight, right tarsal strip lower eyelid;  Surgeon: Milana Malave MD;  Location:  OR     IR CHEMO EMBOLIZATION  2019     KNEE SURGERY Left      ORTHOPEDIC SURGERY       PAROTIDECTOMY, RADICAL NECK DISSECTION Right 2017    Procedure: PAROTIDECTOMY, RADICAL NECK DISSECTION;  Right Superfacial Parotidectomy , Facial nerve repair. with [facility] facial nerve monitor.;  Surgeon: Asiya Morgan MD;  Location: [facility] OR     PICC INSERTION Left 2017    4fr SL BioFlo PICC, 44cm in the L basilic vein w/ tip in the low SVC     RETURN LIVER TRANSPLANT N/A 2019    Procedure: Exploratory laparotomy, hematoma evacuation, abdominal washout;  Surgeon: Александр Ramos MD;  Location: [facility] OR     TRANSPLANT LIVER RECIPIENT  DONOR N/A 2019    Procedure: TRANSPLANT, LIVER, RECIPIENT,  DONOR;  Surgeon: Александр Ramos MD;  Location: [facility] OR     VASCULAR SURGERY            Social History:     Social History        Patient lives alone and has no close friends or family members for support.     Tobacco Use     Smoking status: Never Smoker     Smokeless tobacco: Former User     Types: Chew     Tobacco comment: 1 tin per week   Substance Use Topics     Alcohol use: No     Alcohol/week: 0.0 standard  drinks     Comment: quit 1996          Family History:     Family History   Problem Relation Age of Onset     Prostate Cancer Maternal Grandfather      Substance Abuse Maternal Grandfather         Alcohol     Colon Cancer Father 60     Pancreatic Cancer Father 60     Prostate Cancer Father      Colorectal Cancer Father      Macular Degeneration Father      Cancer Father      Glaucoma Father      Skin Cancer Father      Colorectal Cancer Maternal Grandmother      Cancer Maternal Grandmother      Substance Abuse Maternal Grandmother         Alcohol     Colorectal Cancer Paternal Grandmother      Cancer Mother      Diabetes Mother          3/2016     Cerebrovascular Disease Mother         Passed away in Feb of this year, 80 years old.     Thyroid Disease Mother      Depression Mother      Asthma Sister         Had since birth     Thyroid Disease Sister      Depression Sister      Liver Disease No family hx of      Melanoma No family hx of      Family history reviewed         Immunizations:   Immunization status is unknown         Allergies:     Allergies   Allergen Reactions     Codeine Other (See Comments)     Cannot take due to liver  Cannot tolerate oral narcotics     Seasonal Allergies      Sneezing, coughing, runny and itchy eyes            Medications:     Facility-Administered Medications Prior to Admission   Medication Dose Route Frequency Provider Last Rate Last Dose     Aflibercept (EYLEA) injection 2 mg  2 mg Intravitreal Q28 Days Enriqueta Martin MD   2 mg at 19 1303     ranibizumab (LUCENTIS) injection syringe 0.5 mg  0.5 mg Intravitreal Q28 Days Enriqueta Martin MD         Medications Prior to Admission   Medication Sig Dispense Refill Last Dose     alpha-lipoic acid 600 MG capsule Take 1 capsule (600 mg) by mouth daily 90 capsule 3 Taking     Artificial Tear Solution (SM ARTIFICIAL TEARS) SOLN Place 1 drop into the right eye every hour as needed Apply at least 4 times  daily and as needed for dry eye 1 Bottle 3 Taking     aspirin (ASA) 325 MG EC tablet Take 1 tablet (325 mg) by mouth daily 30 tablet 3 Taking     BD VIKTORIA U/F 32G X 4 MM insulin pen needle Use 5 per day 300 each 3 Taking     blood glucose (ACCU-CHEK EDINSON PLUS) test strip USE TO TEST FOUR TIMES DAILY OR AS DIRECTED 400 strip 6 Taking     blood glucose monitoring (ACCU-CHEK EDINSON PLUS) test strip Use to test blood sugar 4 times daily 400 each 3 Taking     blood glucose monitoring (ACCU-CHEK FASTCLIX) lancets Use to test blood sugar 4 times daily or as directed.  1 box = 102 lancets 408 each 3 Taking     carvedilol (COREG) 25 MG tablet Take 0.5 tablets (12.5 mg) by mouth 2 times daily (with meals) 60 tablet 3 Taking     Cyanocobalamin 5000 MCG TBDP Take 5,000 mcg by mouth daily   Taking     econazole nitrate 1 % external cream Apply topically daily To feet and toenails. 85 g 0 Taking     ferrous sulfate (FEROSUL) 325 (65 Fe) MG tablet Take 1 tablet (325 mg) by mouth 3 times daily (with meals) 90 tablet 3 Taking     glucose (BD GLUCOSE) 4 g chewable tablet Take 1 tablet by mouth every hour as needed for low blood sugar 60 tablet 1 Taking     insulin aspart (NOVOLOG PEN) 100 UNIT/ML pen Inject 1-7 Units Subcutaneous 3 times daily (with meals) DOSE:  1 units per 5 grams of carbohydrate.  Only chart total amount of units given.  Do not give if pre-prandial glucose is less than 60 mg/dL. If given at mealtime, administer within 30 minutes of start of meal 3 mL 3 Taking     insulin aspart (NOVOLOG PEN) 100 UNIT/ML pen Inject 1-7 Units Subcutaneous Take with snacks or supplements for high blood sugar DOSE:  1 units per 5 grams of carbohydrate. Only chart total amount of units given.  Do not give if pre-prandial glucose is less than 60 mg/dL. If given at mealtime, administer within 30 minutes of start of meal   Taking     insulin aspart (NOVOLOG PEN) 100 UNIT/ML pen Inject 1-10 Units Subcutaneous 3 times daily (with meals)  Correction Scale - custom DOSING     Do Not give Correction Insulin if Pre-Meal BG less than 140    to 159 give 1 units.    to 179 give 2 units.    to 199 give 3 units.    to 219 give 4 units.    to 239 give 5 units.    to 259 give 6 units.    to 279 give 7 units.    to 299 give 8 units.    to 319 give 9 units.    to 339 give 10 units.   To be given with prandial insulin, and based on pre-meal blood glucose.   Notify provider if glucose greater than or equal 340 mg/dL after administration. If given at mealtime, administer within 30 minutes of start of meal 3 mL  Taking     insulin aspart (NOVOLOG PEN) 100 UNIT/ML pen Inject 1-11 Units Subcutaneous At Bedtime Correction Scale - custom DOSING (1:20)   to 219 give 1 units.    to 239 give 2 units.    to 259 give 3 units.    to 279 give 4 units.    to 299 give 5 units.    to 319 give 6 units.    to 339 give 7 units.    to 359 give 8 units    to 379 give 9 units.   to 399 give 10 units.  BG >/=400 give 11 units.  Notify provider if glucose greater than or equal to 400 mg/dL after administration. If given at mealtime, administer within 30 minutes of start of meal   Taking     insulin degludec (TRESIBA) 200 UNIT/ML pen Inject 15 Units Subcutaneous daily  100 mL 3 Taking     loperamide (IMODIUM) 2 MG capsule Take 1 capsule (2 mg) by mouth 4 times daily as needed for diarrhea 20 capsule 1 Taking     Multiple Vitamin (THERAVITE PO) Take 1 tablet by mouth every morning    Taking     mycophenolic acid (GENERIC EQUIVALENT) 180 MG EC tablet Take 3 tablets (540 mg) by mouth every 12 hours 180 tablet 3 Taking     omeprazole (PRILOSEC) 40 MG DR capsule Take 1 capsule (40 mg) by mouth daily 90 capsule 2 Taking     ondansetron (ZOFRAN-ODT) 4 MG ODT tab Take 1 tablet (4 mg) by mouth every 6 hours as needed for nausea or vomiting 10 tablet 0 Taking     order for DME  Equipment being ordered: Cane ()  Treatment Diagnosis: impaired gait 1 each 0 Taking     oxyCODONE (ROXICODONE) 5 MG tablet Take 1 tablet (5 mg) by mouth every 6 hours as needed for moderate to severe pain 20 tablet 0 Taking     rosuvastatin (CRESTOR) 5 MG tablet Take 1 tablet (5 mg) by mouth daily 30 tablet 3 Taking     sulfamethoxazole-trimethoprim (BACTRIM/SEPTRA) 400-80 MG tablet Take 1 tablet by mouth daily 30 tablet 11 Taking     tacrolimus (GENERIC EQUIVALENT) 1 MG capsule Take 2 capsules (2 mg) by mouth 2 times daily 120 capsule 11 Taking     tamsulosin (FLOMAX) 0.4 MG capsule TAKE 1 CAPSULE(0.4 MG) BY MOUTH DAILY 90 capsule 3 Taking     tenofovir (VIREAD) 300 MG tablet Take 1 tablet (300 mg) by mouth daily 30 tablet 11 Taking     traZODone (DESYREL) 50 MG tablet Take 1 tablet (50 mg) by mouth At Bedtime 90 tablet 1 Taking     valGANciclovir (VALCYTE) 450 MG tablet Take 1 tablet (450 mg) by mouth daily 30 tablet 5 Taking     vitamin D3 (CHOLECALCIFEROL) 2000 units (50 mcg) tablet Take 1 tablet by mouth daily   Taking             Review of Systems:   The Review of Systems is negative other than noted in the HPI         Physical Exam:   Vitals were reviewed  Temp: 98  F (36.7  C) Temp src: Oral BP: 99/64 Pulse: 87   Resp: 18 SpO2: 99 % O2 Device: None (Room air)    Constitutional:   awake, alert, cooperative, no apparent distress, and appears stated age     ENT:   Normocephalic, without obvious abnormality, atraumatic, sinuses nontender on palpation, external ears without lesions, oral pharynx with moist mucous membranes, tonsils without erythema or exudates, gums normal and no teeth.     Lungs:   No increased work of breathing, good air exchange, clear to auscultation bilaterally, no crackles or wheezing     Cardiovascular:   Regular rate and rhythm, normal S1 and S2, no S3 or S4, and no murmur noted     Abdomen:   Scar noted transverse upper abdomen with staples in place.      Neurologic:   Awake,  alert, oriented to name, place and time.       Skin:   no jaundice and ecchymosis scattered            Data:     Lab Results   Component Value Date    WBC 6.7 12/02/2019    HGB 7.6 (L) 12/02/2019    HCT 23.4 (L) 12/02/2019     12/02/2019     (L) 12/02/2019    POTASSIUM 5.5 (H) 12/02/2019    CHLORIDE 106 12/02/2019    CO2 14 (L) 12/02/2019    BUN 86 (H) 12/02/2019    CR 3.17 (H) 12/02/2019     (H) 12/02/2019    TROPI <0.015 01/24/2019    AST 16 12/02/2019    ALT 26 12/02/2019    ALKPHOS 185 (H) 12/02/2019    BILITOTAL 0.5 12/02/2019    JAMARI 31 11/15/2019    INR 1.11 11/16/2019      Maryanne Recio PA-C     Attestation:    The patient has not been seen and evaluated by me.   Vital signs, labs, medications and orders were reviewed.   When obtained, diagnostic images were reviewed by me and interpreted as above.    The care plan was discussed with the multidisciplinary team and I agree with the findings and plan in this note, with any differences recorded in blue.    Immunosuppressive medication management was reviewed and adjusted as reflected in the note and orders.     .

## 2019-12-03 ENCOUNTER — APPOINTMENT (OUTPATIENT)
Dept: PHYSICAL THERAPY | Facility: CLINIC | Age: 54
DRG: 682 | End: 2019-12-03
Attending: PHYSICIAN ASSISTANT
Payer: MEDICARE

## 2019-12-03 ENCOUNTER — APPOINTMENT (OUTPATIENT)
Dept: GENERAL RADIOLOGY | Facility: CLINIC | Age: 54
DRG: 682 | End: 2019-12-03
Attending: PHYSICIAN ASSISTANT
Payer: MEDICARE

## 2019-12-03 ENCOUNTER — APPOINTMENT (OUTPATIENT)
Dept: GENERAL RADIOLOGY | Facility: CLINIC | Age: 54
DRG: 682 | End: 2019-12-03
Attending: TRANSPLANT SURGERY
Payer: MEDICARE

## 2019-12-03 ENCOUNTER — APPOINTMENT (OUTPATIENT)
Dept: OCCUPATIONAL THERAPY | Facility: CLINIC | Age: 54
DRG: 682 | End: 2019-12-03
Attending: PHYSICIAN ASSISTANT
Payer: MEDICARE

## 2019-12-03 LAB
ALBUMIN SERPL-MCNC: 2.4 G/DL (ref 3.4–5)
ALP SERPL-CCNC: 148 U/L (ref 40–150)
ALT SERPL W P-5'-P-CCNC: 21 U/L (ref 0–70)
ANION GAP SERPL CALCULATED.3IONS-SCNC: 9 MMOL/L (ref 3–14)
AST SERPL W P-5'-P-CCNC: 13 U/L (ref 0–45)
BASOPHILS # BLD AUTO: 0 10E9/L (ref 0–0.2)
BASOPHILS NFR BLD AUTO: 0.2 %
BILIRUB DIRECT SERPL-MCNC: 0.1 MG/DL (ref 0–0.2)
BILIRUB SERPL-MCNC: 0.3 MG/DL (ref 0.2–1.3)
BUN SERPL-MCNC: 75 MG/DL (ref 7–30)
C COLI+JEJUNI+LARI FUSA STL QL NAA+PROBE: NOT DETECTED
CALCIUM SERPL-MCNC: 8.9 MG/DL (ref 8.5–10.1)
CHLORIDE SERPL-SCNC: 117 MMOL/L (ref 94–109)
CMV DNA SPEC NAA+PROBE-ACNC: NORMAL [IU]/ML
CMV DNA SPEC NAA+PROBE-LOG#: NORMAL {LOG_IU}/ML
CO2 SERPL-SCNC: 12 MMOL/L (ref 20–32)
CREAT SERPL-MCNC: 2.94 MG/DL (ref 0.66–1.25)
DIFFERENTIAL METHOD BLD: ABNORMAL
EC STX1 GENE STL QL NAA+PROBE: NOT DETECTED
EC STX2 GENE STL QL NAA+PROBE: NOT DETECTED
ENTERIC PATHOGEN COMMENT: NORMAL
EOSINOPHIL # BLD AUTO: 0.1 10E9/L (ref 0–0.7)
EOSINOPHIL NFR BLD AUTO: 2.5 %
ERYTHROCYTE [DISTWIDTH] IN BLOOD BY AUTOMATED COUNT: 20.3 % (ref 10–15)
GFR SERPL CREATININE-BSD FRML MDRD: 23 ML/MIN/{1.73_M2}
GLUCOSE BLDC GLUCOMTR-MCNC: 135 MG/DL (ref 70–99)
GLUCOSE BLDC GLUCOMTR-MCNC: 143 MG/DL (ref 70–99)
GLUCOSE BLDC GLUCOMTR-MCNC: 150 MG/DL (ref 70–99)
GLUCOSE BLDC GLUCOMTR-MCNC: 151 MG/DL (ref 70–99)
GLUCOSE BLDC GLUCOMTR-MCNC: 159 MG/DL (ref 70–99)
GLUCOSE SERPL-MCNC: 173 MG/DL (ref 70–99)
HADV AG STL QL IA: NEGATIVE
HCT VFR BLD AUTO: 20.4 % (ref 40–53)
HGB BLD-MCNC: 6.4 G/DL (ref 13.3–17.7)
HGB BLD-MCNC: 7.3 G/DL (ref 13.3–17.7)
IMM GRANULOCYTES # BLD: 0.1 10E9/L (ref 0–0.4)
IMM GRANULOCYTES NFR BLD: 1.1 %
LYMPHOCYTES # BLD AUTO: 0.4 10E9/L (ref 0.8–5.3)
LYMPHOCYTES NFR BLD AUTO: 8.5 %
MAGNESIUM SERPL-MCNC: 2.1 MG/DL (ref 1.6–2.3)
MCH RBC QN AUTO: 30.8 PG (ref 26.5–33)
MCHC RBC AUTO-ENTMCNC: 31.4 G/DL (ref 31.5–36.5)
MCV RBC AUTO: 98 FL (ref 78–100)
MONOCYTES # BLD AUTO: 0.3 10E9/L (ref 0–1.3)
MONOCYTES NFR BLD AUTO: 6.2 %
NEUTROPHILS # BLD AUTO: 3.5 10E9/L (ref 1.6–8.3)
NEUTROPHILS NFR BLD AUTO: 81.5 %
NOROV GI+II ORF1-ORF2 JNC STL QL NAA+PR: NOT DETECTED
NRBC # BLD AUTO: 0 10*3/UL
NRBC BLD AUTO-RTO: 0 /100
PHOSPHATE SERPL-MCNC: 4.1 MG/DL (ref 2.5–4.5)
PLATELET # BLD AUTO: 142 10E9/L (ref 150–450)
POTASSIUM SERPL-SCNC: 4.9 MMOL/L (ref 3.4–5.3)
POTASSIUM SERPL-SCNC: 5.1 MMOL/L (ref 3.4–5.3)
POTASSIUM SERPL-SCNC: 5.4 MMOL/L (ref 3.4–5.3)
POTASSIUM SERPL-SCNC: 6 MMOL/L (ref 3.4–5.3)
PROT SERPL-MCNC: 6.4 G/DL (ref 6.8–8.8)
RBC # BLD AUTO: 2.08 10E12/L (ref 4.4–5.9)
RVA NSP5 STL QL NAA+PROBE: NOT DETECTED
SALMONELLA SP RPOD STL QL NAA+PROBE: NOT DETECTED
SHIGELLA SP+EIEC IPAH STL QL NAA+PROBE: NOT DETECTED
SODIUM SERPL-SCNC: 138 MMOL/L (ref 133–144)
SPECIMEN SOURCE: NORMAL
TACROLIMUS BLD-MCNC: 8.8 UG/L (ref 5–15)
TME LAST DOSE: NORMAL H
V CHOL+PARA RFBL+TRKH+TNAA STL QL NAA+PR: NOT DETECTED
WBC # BLD AUTO: 4.4 10E9/L (ref 4–11)
Y ENTERO RECN STL QL NAA+PROBE: NOT DETECTED

## 2019-12-03 PROCEDURE — 97535 SELF CARE MNGMENT TRAINING: CPT | Mod: GO

## 2019-12-03 PROCEDURE — 25000132 ZZH RX MED GY IP 250 OP 250 PS 637: Mod: GY | Performed by: NURSE PRACTITIONER

## 2019-12-03 PROCEDURE — 25000132 ZZH RX MED GY IP 250 OP 250 PS 637: Mod: GY | Performed by: STUDENT IN AN ORGANIZED HEALTH CARE EDUCATION/TRAINING PROGRAM

## 2019-12-03 PROCEDURE — 71045 X-RAY EXAM CHEST 1 VIEW: CPT

## 2019-12-03 PROCEDURE — 97530 THERAPEUTIC ACTIVITIES: CPT | Mod: GP | Performed by: REHABILITATION PRACTITIONER

## 2019-12-03 PROCEDURE — 86901 BLOOD TYPING SEROLOGIC RH(D): CPT | Performed by: NURSE PRACTITIONER

## 2019-12-03 PROCEDURE — 80197 ASSAY OF TACROLIMUS: CPT | Performed by: PHYSICIAN ASSISTANT

## 2019-12-03 PROCEDURE — 00000146 ZZHCL STATISTIC GLUCOSE BY METER IP

## 2019-12-03 PROCEDURE — 86900 BLOOD TYPING SEROLOGIC ABO: CPT | Performed by: NURSE PRACTITIONER

## 2019-12-03 PROCEDURE — 36415 COLL VENOUS BLD VENIPUNCTURE: CPT | Performed by: NURSE PRACTITIONER

## 2019-12-03 PROCEDURE — 97161 PT EVAL LOW COMPLEX 20 MIN: CPT | Mod: GP | Performed by: REHABILITATION PRACTITIONER

## 2019-12-03 PROCEDURE — 12000026 ZZH R&B TRANSPLANT

## 2019-12-03 PROCEDURE — 97110 THERAPEUTIC EXERCISES: CPT | Mod: GP | Performed by: REHABILITATION PRACTITIONER

## 2019-12-03 PROCEDURE — 25800030 ZZH RX IP 258 OP 636: Performed by: PHYSICIAN ASSISTANT

## 2019-12-03 PROCEDURE — 25000132 ZZH RX MED GY IP 250 OP 250 PS 637: Mod: GY | Performed by: PHYSICIAN ASSISTANT

## 2019-12-03 PROCEDURE — 27210432 ZZH NUTRITION PRODUCT RENAL BASIC LITER

## 2019-12-03 PROCEDURE — 25000131 ZZH RX MED GY IP 250 OP 636 PS 637: Mod: GY | Performed by: NURSE PRACTITIONER

## 2019-12-03 PROCEDURE — 25000128 H RX IP 250 OP 636: Performed by: STUDENT IN AN ORGANIZED HEALTH CARE EDUCATION/TRAINING PROGRAM

## 2019-12-03 PROCEDURE — 27211246 XR FEEDING TUBE PLACEMENT

## 2019-12-03 PROCEDURE — 84132 ASSAY OF SERUM POTASSIUM: CPT | Performed by: STUDENT IN AN ORGANIZED HEALTH CARE EDUCATION/TRAINING PROGRAM

## 2019-12-03 PROCEDURE — 85025 COMPLETE CBC W/AUTO DIFF WBC: CPT | Performed by: PHYSICIAN ASSISTANT

## 2019-12-03 PROCEDURE — 97530 THERAPEUTIC ACTIVITIES: CPT | Mod: GO

## 2019-12-03 PROCEDURE — 99221 1ST HOSP IP/OBS SF/LOW 40: CPT | Performed by: NURSE PRACTITIONER

## 2019-12-03 PROCEDURE — 83735 ASSAY OF MAGNESIUM: CPT | Performed by: PHYSICIAN ASSISTANT

## 2019-12-03 PROCEDURE — 86850 RBC ANTIBODY SCREEN: CPT | Performed by: NURSE PRACTITIONER

## 2019-12-03 PROCEDURE — 36415 COLL VENOUS BLD VENIPUNCTURE: CPT | Performed by: PHYSICIAN ASSISTANT

## 2019-12-03 PROCEDURE — 80076 HEPATIC FUNCTION PANEL: CPT | Performed by: PHYSICIAN ASSISTANT

## 2019-12-03 PROCEDURE — 25000128 H RX IP 250 OP 636: Performed by: PHYSICIAN ASSISTANT

## 2019-12-03 PROCEDURE — 80048 BASIC METABOLIC PNL TOTAL CA: CPT | Performed by: PHYSICIAN ASSISTANT

## 2019-12-03 PROCEDURE — 36415 COLL VENOUS BLD VENIPUNCTURE: CPT | Performed by: STUDENT IN AN ORGANIZED HEALTH CARE EDUCATION/TRAINING PROGRAM

## 2019-12-03 PROCEDURE — 84132 ASSAY OF SERUM POTASSIUM: CPT | Performed by: NURSE PRACTITIONER

## 2019-12-03 PROCEDURE — 97165 OT EVAL LOW COMPLEX 30 MIN: CPT | Mod: GO

## 2019-12-03 PROCEDURE — 85018 HEMOGLOBIN: CPT | Performed by: NURSE PRACTITIONER

## 2019-12-03 PROCEDURE — 97116 GAIT TRAINING THERAPY: CPT | Mod: GP | Performed by: REHABILITATION PRACTITIONER

## 2019-12-03 PROCEDURE — 25000131 ZZH RX MED GY IP 250 OP 636 PS 637: Mod: GY | Performed by: PHYSICIAN ASSISTANT

## 2019-12-03 PROCEDURE — 84100 ASSAY OF PHOSPHORUS: CPT | Performed by: PHYSICIAN ASSISTANT

## 2019-12-03 PROCEDURE — 86923 COMPATIBILITY TEST ELECTRIC: CPT | Performed by: NURSE PRACTITIONER

## 2019-12-03 PROCEDURE — 25000125 ZZHC RX 250: Performed by: RADIOLOGY

## 2019-12-03 RX ORDER — MIRTAZAPINE 15 MG/1
15 TABLET, FILM COATED ORAL AT BEDTIME
Status: DISCONTINUED | OUTPATIENT
Start: 2019-12-03 | End: 2019-12-10 | Stop reason: HOSPADM

## 2019-12-03 RX ORDER — TACROLIMUS 1 MG/1
2 CAPSULE ORAL 2 TIMES DAILY
Status: DISCONTINUED | OUTPATIENT
Start: 2019-12-03 | End: 2019-12-04

## 2019-12-03 RX ORDER — SODIUM POLYSTYRENE SULFONATE 15 G/60ML
30 SUSPENSION ORAL; RECTAL ONCE
Status: COMPLETED | OUTPATIENT
Start: 2019-12-03 | End: 2019-12-03

## 2019-12-03 RX ORDER — SIMETHICONE 80 MG
80 TABLET,CHEWABLE ORAL EVERY 6 HOURS PRN
Status: DISCONTINUED | OUTPATIENT
Start: 2019-12-03 | End: 2019-12-10 | Stop reason: HOSPADM

## 2019-12-03 RX ORDER — HYDROMORPHONE HCL/0.9% NACL/PF 0.2MG/0.2
0.2 SYRINGE (ML) INTRAVENOUS ONCE
Status: COMPLETED | OUTPATIENT
Start: 2019-12-03 | End: 2019-12-03

## 2019-12-03 RX ORDER — LIDOCAINE 4 G/G
1 PATCH TOPICAL
Status: DISCONTINUED | OUTPATIENT
Start: 2019-12-03 | End: 2019-12-10 | Stop reason: HOSPADM

## 2019-12-03 RX ORDER — SULFAMETHOXAZOLE AND TRIMETHOPRIM 400; 80 MG/1; MG/1
1 TABLET ORAL
Status: DISCONTINUED | OUTPATIENT
Start: 2019-12-05 | End: 2019-12-05

## 2019-12-03 RX ORDER — LANOLIN ALCOHOL/MO/W.PET/CERES
1000 CREAM (GRAM) TOPICAL DAILY
Status: DISCONTINUED | OUTPATIENT
Start: 2019-12-03 | End: 2019-12-10 | Stop reason: HOSPADM

## 2019-12-03 RX ORDER — ACETAMINOPHEN 325 MG/1
650 TABLET ORAL ONCE
Status: COMPLETED | OUTPATIENT
Start: 2019-12-03 | End: 2019-12-03

## 2019-12-03 RX ORDER — LIDOCAINE HYDROCHLORIDE 20 MG/ML
5 SOLUTION OROPHARYNGEAL ONCE
Status: COMPLETED | OUTPATIENT
Start: 2019-12-03 | End: 2019-12-03

## 2019-12-03 RX ORDER — VALGANCICLOVIR 450 MG/1
450 TABLET, FILM COATED ORAL EVERY OTHER DAY
Status: DISCONTINUED | OUTPATIENT
Start: 2019-12-05 | End: 2019-12-05

## 2019-12-03 RX ORDER — TENOFOVIR DISOPROXIL FUMARATE 300 MG/1
300 TABLET, FILM COATED ORAL
Status: DISCONTINUED | OUTPATIENT
Start: 2019-12-05 | End: 2019-12-07

## 2019-12-03 RX ADMIN — TACROLIMUS 2 MG: 1 CAPSULE ORAL at 07:36

## 2019-12-03 RX ADMIN — INSULIN ASPART 1 UNITS: 100 INJECTION, SOLUTION INTRAVENOUS; SUBCUTANEOUS at 07:37

## 2019-12-03 RX ADMIN — ROSUVASTATIN CALCIUM 5 MG: 5 TABLET, FILM COATED ORAL at 20:00

## 2019-12-03 RX ADMIN — MIRTAZAPINE 15 MG: 15 TABLET, FILM COATED ORAL at 21:58

## 2019-12-03 RX ADMIN — ONDANSETRON 4 MG: 4 TABLET, ORALLY DISINTEGRATING ORAL at 13:31

## 2019-12-03 RX ADMIN — SODIUM CHLORIDE: 9 INJECTION, SOLUTION INTRAVENOUS at 13:00

## 2019-12-03 RX ADMIN — CYANOCOBALAMIN TAB 1000 MCG 1000 MCG: 1000 TAB at 17:53

## 2019-12-03 RX ADMIN — MYCOPHENOLIC ACID 540 MG: 360 TABLET, DELAYED RELEASE ORAL at 07:37

## 2019-12-03 RX ADMIN — ASPIRIN 325 MG: 325 TABLET, DELAYED RELEASE ORAL at 07:37

## 2019-12-03 RX ADMIN — SULFAMETHOXAZOLE AND TRIMETHOPRIM 1 TABLET: 400; 80 TABLET ORAL at 07:37

## 2019-12-03 RX ADMIN — FERROUS SULFATE TAB 325 MG (65 MG ELEMENTAL FE) 325 MG: 325 (65 FE) TAB at 11:43

## 2019-12-03 RX ADMIN — TENOFOVIR DISOPROXIL FUMARATE 300 MG: 300 TABLET ORAL at 07:37

## 2019-12-03 RX ADMIN — LIDOCAINE 1 PATCH: 560 PATCH PERCUTANEOUS; TOPICAL; TRANSDERMAL at 02:03

## 2019-12-03 RX ADMIN — Medication 0.2 MG: at 04:07

## 2019-12-03 RX ADMIN — ACETAMINOPHEN 650 MG: 325 TABLET, FILM COATED ORAL at 06:45

## 2019-12-03 RX ADMIN — TAMSULOSIN HYDROCHLORIDE 0.4 MG: 0.4 CAPSULE ORAL at 07:37

## 2019-12-03 RX ADMIN — ONDANSETRON 4 MG: 4 TABLET, ORALLY DISINTEGRATING ORAL at 20:00

## 2019-12-03 RX ADMIN — Medication 600 MG: at 17:53

## 2019-12-03 RX ADMIN — TACROLIMUS 2 MG: 1 CAPSULE ORAL at 17:53

## 2019-12-03 RX ADMIN — FERROUS SULFATE TAB 325 MG (65 MG ELEMENTAL FE) 325 MG: 325 (65 FE) TAB at 17:53

## 2019-12-03 RX ADMIN — MYCOPHENOLIC ACID 540 MG: 360 TABLET, DELAYED RELEASE ORAL at 20:00

## 2019-12-03 RX ADMIN — INSULIN ASPART 1 UNITS: 100 INJECTION, SOLUTION INTRAVENOUS; SUBCUTANEOUS at 11:46

## 2019-12-03 RX ADMIN — MELATONIN 2000 UNITS: at 17:53

## 2019-12-03 RX ADMIN — ONDANSETRON 4 MG: 4 TABLET, ORALLY DISINTEGRATING ORAL at 07:36

## 2019-12-03 RX ADMIN — OMEPRAZOLE 40 MG: 20 CAPSULE, DELAYED RELEASE ORAL at 07:37

## 2019-12-03 RX ADMIN — FERROUS SULFATE TAB 325 MG (65 MG ELEMENTAL FE) 325 MG: 325 (65 FE) TAB at 07:36

## 2019-12-03 RX ADMIN — SODIUM POLYSTYRENE SULFONATE 30 G: 15 SUSPENSION ORAL; RECTAL at 07:38

## 2019-12-03 RX ADMIN — VALGANCICLOVIR 450 MG: 450 TABLET, FILM COATED ORAL at 07:36

## 2019-12-03 RX ADMIN — LIDOCAINE HYDROCHLORIDE 10 ML: 20 SOLUTION ORAL; TOPICAL at 10:35

## 2019-12-03 ASSESSMENT — ACTIVITIES OF DAILY LIVING (ADL)
ADLS_ACUITY_SCORE: 13
ADLS_ACUITY_SCORE: 14
ADLS_ACUITY_SCORE: 13
ADLS_ACUITY_SCORE: 13
ADLS_ACUITY_SCORE: 14
ADLS_ACUITY_SCORE: 15
PREVIOUS_RESPONSIBILITIES: MEAL PREP;HOUSEKEEPING;LAUNDRY;SHOPPING;MEDICATION MANAGEMENT;FINANCES;DRIVING

## 2019-12-03 ASSESSMENT — PAIN DESCRIPTION - DESCRIPTORS: DESCRIPTORS: SHARP;SHOOTING

## 2019-12-03 NOTE — CONSULTS
"NEW INPATIENT DIABETES MANAGEMENT CONSULT  Frandy Workman  Age: 54 year old  MRN # [id number]   YOB: 1965    Chief Complaint: confusion, JERONIMO   Reason for Consult: glycemic management  Consulting Provider: Maryanne Recio PA-C    History of Present Illness:   Mr. Miller Workman is a 55 yo man with a history of type 2 diabetes complicated by peripheral neuropathy and retinopathy, cirrhosis secondary to ESTRADA, HCC, HTN, HLD, GERD, BPH, who is s/p DBD liver transplant on 11/11/19, who was readmitted 12/2/19 with acute renal failure, confusion, and elevated tacrolimus level.     The Inpatient Diabetes service was consulted in his prior hospitalization. He temporarily required enteral feeding while hospitalized.  Recommendations at time of discharge were for patient to resume Tresiba at lower than PTA dose (55 units). He was instructed to resume his PTA mealtime/bolus insulin as per the previously input targets on his meter. He utilizes an [employer] expert meter which provides bolus recommendations based upon previously input settings. He was to hold Metformin and Farxiga until instructed to resume in follow up.      There was some confusion immediately upon discharge with regard to bolus dosing. He reports he was told not to take \"fast acting insulin\" on discharge. He has had frequent episodes of hypoglycemia, related to poor PO intake and skipping meals.    His caregiver went back to [place], and he is worried about his ability to care for himself. He was notably confused on all aspects of his care when seen in endocrine clinic 12/2. His insulin doses were reduced by multiple providers following discharge, leading to additional confusion:  -Tresiba was reduced from 55 to 36 units.   -Tresiba was reduced again from 36 to 15 units, and -he was instructed to reduce to 1:10g CHO and hold Tresiba for one day.  (of note, his Tresiba pen goes up by two unit increments (U-200 pen), so he was actually using " "14.)  -His sliding scale was reduced to 1/40>140 scale.     See endocrine clinic note 12/2 for additional details. His PCA was present at this visit, and had concerns that he was not correctly calculating or administering his insulin.     Last Tresiba dose was 14 units on 12/2 in the morning.      This admission:   Tresiba was held 12/3.   >149>159>173>150 with moderate intensity sliding scale, and 4 units for a Boost given at 2200.   He had 20g CHO for breakfast today, received 2 units aspart for this. Pre-lunch BG stable at 151.     Per RD, planning NGT placement and will start Nepro tube feeds at 10cc/hour today, titrating q6h to goal 60cc/hour. Pending calorie counts, he may transition rather quickly to cycled 12 hour tube feeds overnight only.     Diabetes Type:   Type 2 Diabetes  Diabetes Duration: diagnosed age 29, started on insulin 18 years ago  Usual Diabetes Regimen:   [employer] Expert for glucose monitoring and calculating aspart doses-(settings input into meter for calculation, he only has to enter his BG and CHO intake). Settings: \"meal rise\"- 50mg/dL. Snack size-10g, active insulin time 3 hrs.    Insulin regimen: (most recently, though with multiple dose changes since hospital discharge due to hypoglycemia)  Tresiba 15 units daily AM   aspart 1unit/10 g CHO  aspart 1unit/40 for BG >140    Held since transplant:   Metformin ER 500mg with supper  Farxiga 10mg daily    Ability to [place] Prescribed Regimen: poor since transplant, due to confusion and multiple regimen changes since discharge.   Diabetes Control:         Lab Results   Component Value Date     A1C 6.6 08/08/2018     A1C 6.5 06/09/2017     A1C 7.8 10/25/2016      Diabetes Complications: retinopathy, peripheral neuropathy  Able to Detect Hypoglycemia: yes, with BG of 60 or less  Usual Diabetes Care Provider: anti rejection drugs, poor PO intake and activity, plans to start enteral feeds.      10 point ROS completed with " pertinent positives and negatives noted in the HPI  Past medical, family and social histories are reviewed and updated.    Social History    Tobacco: never    Alcohol: no    Marital Status:     Place of Residence: [place], [place]    Family History  mother with TIIDM  Sister with thyroid disease     Physical Exam   /67 (BP Location: Right arm)   Pulse 91   Temp 98.3  F (36.8  C) (Oral)   Resp 16   Wt 74.3 kg (163 lb 11.2 oz)   SpO2 100%   BMI 24.17 kg/m    General: pleasant, in no distress. Sitting up in chair. Able to follow conversation today.  HEENT: normocephalic, atraumatic. Oral mucous membranes moist.   Lungs: unlabored respiration, no cough  ABD: rounded, soft, no lipodystrophy noted  Skin: warm and dry, no obvious lesions  MSK:  moves all extremities  Lymp:  no LE edema   Mental status:  alert, oriented to self, place, time, still foggy with regard to some details of recent medical care.   Psych:  affect, calm and appropriate interaction     Most Recent Laboratory Tests:  Recent Labs   Lab 12/03/19  0644   HGB 7.3*     No results for input(s): A1C in the last 168 hours.  Recent Labs   Lab 12/03/19  0536   CR 2.94*     Recent Labs   Lab 12/03/19  1146 12/03/19  0717 12/03/19  0536 12/03/19  0201 12/02/19  2204 12/02/19  2029 12/02/19  1648 12/02/19  1034 11/27/19  0844   GLC  --   --  173*  --   --   --   --  194* 51*   * 150*  --  159* 149* 143* 117*  --   --        Assessment:   1) TIIDM  2) recent liver transplant, with admission for JERONIMO, confusion.     Glycemic control is stable and within target, with last dose of Tresiba taken 12/2/19 in the morning (per patient, 14 units). Tresiba will still be washing out over next ~24 hours.     Anticipating hyperglycemia as tube feeds started and titrating q6h. In previous hospitalization, received NPH 45 units for cycled Nutren 1.5 (127g CHO) while on IV insulin, with IV insulin running ~2 units per hour representing basal requirement and while  on Prednisone). At 10cc/hour, will be receiving 38.6g CHO daily (1.6g CHO per hour), thus will not likely require basal insulin initiation until running at higher rate.     Agree with TCU discharge; he is not felt to be able to self manage insulin regimen in his current state.     Plan:    -hold basal insulin today (Tresiba still on board).    -anticipate basal insulin resumption tomorrow AM, with tube feeds titrating. Given plans ultimately to cycle tube feeds, anticipate utilizing NPH insulin BID in place of Lantus or Tresiba for now.    -aspart 1:10g CHO coverage with meals and snacks    -moderate intensity sliding scale AC/HS   -PRN IV insulin ordered for BG >250 x2 as tube feeds are titrating    -please inform diabetes service of plans to cycle enteral feeds in the coming days    -BG monitoring TID AC, HS, 0200   -hypoglycemia protocol   -diabetes education will be recommended prior to his discharge home.   -on discharge, will recommend outpatient follow up with [facility] endocrinology service     Discussed plan of care with patient, nursing. Primary team paged today.   Thank you for this consult; Inpatient Diabetes will continue to follow.     Diabetes Management Team job code: [id number]    I spent a total of 80 minutes bedside and on the inpatient unit managing glycemic care. Over 50% of my time on the unit was spent counseling the patient and/or coordinating care regarding acute hyperglycemia management.  See note for details.    Lanie Atkinson PA-C  Inpatient Diabetes Management Service  Pager [phone]

## 2019-12-03 NOTE — PROGRESS NOTES
Transplant Surgery  Inpatient Daily Progress Note  12/03/2019    Assessment & Plan: Frandy Workman is a 54 year old male with PMHx significant for cirrhosis secondary to ESTRADA diagnosed in 2013, HCC 2018, HTN, HLD, GERD, BPH and DMII. S/p liver transplant 11/11/19 complicated by hematoma evacuation on POD 1. Now admitted with JERONIMO, nausea, diarrhea, confusion and weakness.     Graft function:   S/p liver transplant: DD OLT 11/11/19. LFTs stable.  Immunosuppression management:   CC: Myfortic 540 mg BID, changed from Cellcept on 12/2 due to nausea.  FK: Tacrolimus 2 mg BID; goal 10-12 (12 hr trough). Level today 8.8 (9hr trough); will continue at current dose and recheck tomorrow.  Complexity of management:Medium. Contributing factors: organ dysfunction and confusion  Hematology:   Anemia due to acute blood loss: Hgb 6.4 with morning labs; recheck 7.3. No evidence of bleeding. Monitor.   Cardiorespiratory:   Hypotensive: PTA Coreg discontinued on admission. Continue IVF.  GI/Nutrition:   Severe malnutrition in the context of acute on chronic illness: Minimal intake outpatient due to confusion, no appetite. NJ placed for TF initiation. RD consulted. Supplements and flynn counts ordered.   Endocrine:   Type II diabetes mellitus: PTA was on Tresiba, carb coverage and sliding scale insulin. Hypoglycemia at home due to poor PO intake. Sliding scale insulin and carb coverage restarted. Hold Tresiba at this time. Endocrinology consulted; recommend continuing to hold basal insulin today, as Tresiba is still on board.   Fluid/Electrolytes: MIVF: NS 125mL/hr.  JERONIMO: likely r/t decreased PO intake outpatient. Cr 3.2 on admission from 1.9; recheck today 2.9. Continue IVF.   Hyperkalemia: K 5; 30G kayexalate administered. Tele and low K diet ordered. Recheck 5.4. Continue fluids and recheck at 20:00.   : No acute issues  Infectious disease: Afebrile; WBC 4.4 (from 6.7). CMV 12/2 not detected.   Diarrhea: Worsening diarrhea prior to  "admission. C. Diff, enteric viral panel, and rotavirus negative. Fecal lactoferrin positive. Continue to monitor.  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   Neuro:   Confusion: Patient intermittently confused at home. MOCA indicating cognitive impairment. Patient feels \"the fog is lifting.\"  Anxiety: Health psychology and psych consulted. Remeron initiated at bedtime per psych recommendations.  Prophylaxis: DVT (mechanical), fall, GI (protonix), viral (Valcyte), pneumocystis (Bactrim)  Disposition: 7A; PT/OT evaluating. Plan for discharge to TCU in 1-2 days.    Medical Decision Making: Medium  Subsequent visit 03859 (moderate level decision making)    SERA/Fellow/Resident Provider: Wendy Donahue NP     Faculty: Berlin Hernandez M.D.    __________________________________________________________________  Transplant History:    11/11/2019 (Liver), Postoperative day: 22     Interval History: History is obtained from the patient  Overnight events: Patient was seen sitting up in a chair this morning.  He reports feeling better today. He has insight into episodes of confusion; reports the \"fog is lifting.\" He denies fever, chills, nausea, vomiting. He is agreeable to going to TCU.    ROS:   A 10-point review of systems was negative except as noted above.    Curent Meds:    alpha-lipoic acid  600 mg Oral Daily     aspirin  325 mg Oral Daily     cyanocobalamin  5,000 mcg Oral Daily     ferrous sulfate  325 mg Oral TID w/meals     insulin aspart  1-7 Units Subcutaneous TID AC     insulin aspart  1-5 Units Subcutaneous At Bedtime     insulin aspart   Subcutaneous TID w/meals     lidocaine   Transdermal Q8H     mirtazapine  15 mg Oral At Bedtime     mycophenolic acid  540 mg Oral Q12H     omeprazole  40 mg Oral Daily     ondansetron  4 mg Oral TID     rosuvastatin  5 mg Oral Daily     sodium chloride (PF)  3 mL Intracatheter Q8H     [START ON 12/5/2019] " sulfamethoxazole-trimethoprim  1 tablet Oral Once per day on Tue Thu Sat     tacrolimus  2.5 mg Oral BID     tamsulosin  0.4 mg Oral Daily     tenofovir  300 mg Oral Daily     [START ON 12/5/2019] valGANciclovir  450 mg Oral Every Other Day     vitamin D3  2,000 Units Oral Daily       Physical Exam:     Admit Weight: 73.8 kg (162 lb 11.2 oz)    Current Vitals:   /67 (BP Location: Right arm)   Pulse 91   Temp 98.3  F (36.8  C) (Oral)   Resp 16   Wt 74.3 kg (163 lb 11.2 oz)   SpO2 100%   BMI 24.17 kg/m      Vital sign ranges:    Temp:  [97.6  F (36.4  C)-98.7  F (37.1  C)] 98.3  F (36.8  C)  Pulse:  [83-94] 91  Heart Rate:  [84-91] 91  Resp:  [16-20] 16  BP: ()/(60-67) 123/67  SpO2:  [98 %-100 %] 100 %    General Appearance: in no apparent distress.   Skin: normal, warm, dry, No rashes, induration, or jaundice  Heart: NSR, well-perfused  Lungs: non-labored breathing on RA  Abdomen: Incision stapled and open to air. Abdomen rounded.  : Carroll is not present.  Extremities: edema: none  Neurologic: alert, oriented. Tremor absent.     Frailty Scores     There is no flowsheet data to display.          Data:   CMP  Recent Labs   Lab 12/03/19  0536 12/03/19  0026  12/02/19  1034     --   --  131*   POTASSIUM 6.0* 5.1   < > 5.5*   CHLORIDE 117*  --   --  106   CO2 12*  --   --  14*   *  --   --  194*   BUN 75*  --   --  86*   CR 2.94*  --   --  3.17*   GFRESTIMATED 23*  --   --  21*   GFRESTBLACK 26*  --   --  24*   DEBORAH 8.9  --   --  9.4   MAG 2.1  --   --  2.4*   PHOS 4.1  --   --  4.7*   ALBUMIN 2.4*  --   --  3.0*   BILITOTAL 0.3  --   --  0.5   ALKPHOS 148  --   --  185*   AST 13  --   --  16   ALT 21  --   --  26    < > = values in this interval not displayed.     CBC  Recent Labs   Lab 12/03/19  0644 12/03/19  0536 12/02/19  1034   HGB 7.3* 6.4* 7.6*   WBC  --  4.4 6.7   PLT  --  142* 164     Attestation:    The patient has been seen and evaluated by me.   Vital signs, labs, medications  and orders were reviewed.   When obtained, diagnostic images were reviewed by me and interpreted as above.    The care plan was discussed with the multidisciplinary team and I agree with the findings and plan in this note, with any differences recorded in blue.    Immunosuppressive medication management was reviewed and adjusted as reflected in the note and orders.     .

## 2019-12-03 NOTE — PHARMACY-ADMISSION MEDICATION HISTORY
"Admission medication history interview status for the 12/2/2019 admission is complete. See Epic admission navigator for allergy information, pharmacy, prior to admission medications and immunization status.     Medication history interview sources:  Patient, home med list (filled out by [facility] pharmacist and physicians), outside meds fill history    Changes made to PTA medication list (reason)  Added: None  Deleted: None  Changed:   -Insulin degludec changed from \"15 units daily\" to \"14 units daily\" per patient - pen works in multiples of 2  -Mealtime and snack time insulin aspart carb coverage changed from \"1 unit per 5 grams carbs\" to \"1 unit per 10 grams carbs\" - verified per [facility] office visit note on 12/2/19  -Cyanocobalamin 5000 mcg tab changed to 1000 mcg tabs per patient - purchases OTC    Additional medication history information:  -Patient was a moderate historian. Was somewhat unfamiliar with his meds, but states he uses his home med list to help manage his medications. Also sees an [facility] pharmacist, which he states helps him manage his meds.   -Patient unsure if he has one or two eye injection, but states he knows he is late to receive the injection(s) (Aflibercept) as he had to miss his November eye appointment due to liver transplant surgery.  -Patient filled oxycodone 5 mg tablets (#20) on 11/20/19, but states he has not been using this medication often.   -Of note, patient was on amlodipine 10 mg daily, however this was discontinued yesterday at a clinic visit.   -Patient affirmed that he is not taking dapagliflozin, rifaximin or metformin.      Prior to Admission medications    Medication Sig Last Dose Taking? Auth Provider   alpha-lipoic acid 600 MG capsule Take 1 capsule (600 mg) by mouth daily 12/2/2019 at Unknown time Yes Jennifer Wilcox MD   Artificial Tear Solution (SM ARTIFICIAL TEARS) SOLN Place 1 drop into the right eye every hour as needed Apply at least 4 times " daily and as needed for dry eye 12/2/2019 at Unknown time Yes Milana Malave MD   aspirin (ASA) 325 MG EC tablet Take 1 tablet (325 mg) by mouth daily 12/2/2019 at Unknown time Yes Wendy Donahue NP   carvedilol (COREG) 25 MG tablet Take 0.5 tablets (12.5 mg) by mouth 2 times daily (with meals) 12/2/2019 at Unknown time Yes Eloy Rao MD   econazole nitrate 1 % external cream Apply topically daily To feet and toenails. Past Month at Unknown time Yes Lamin Gonzalez DPM   ferrous sulfate (FEROSUL) 325 (65 Fe) MG tablet Take 1 tablet (325 mg) by mouth 3 times daily (with meals) 12/2/2019 at Unknown time Yes Santi Rosas MD   glucose (BD GLUCOSE) 4 g chewable tablet Take 1 tablet by mouth every hour as needed for low blood sugar Past Month at Unknown time Yes Nerissa Moe MD   insulin aspart (NOVOLOG FLEXPEN) 100 UNIT/ML pen Inject 1-7 Units Subcutaneous 3 times daily (with meals) DOSE:  1 units per 10 grams of carbohydrate.  Only chart total amount of units given.  Do not give if pre-prandial glucose is less than 60 mg/dL. If given at mealtime, administer within 30 minutes of start of meal 12/2/2019 at Unknown time Yes Unknown, Entered By History   insulin aspart (NOVOLOG PEN) 100 UNIT/ML pen Inject 1-7 Units Subcutaneous Take with snacks or supplements for high blood sugar DOSE:  1 units per 5 grams of carbohydrate. Only chart total amount of units given.  Do not give if pre-prandial glucose is less than 60 mg/dL. If given at mealtime, administer within 30 minutes of start of meal  Yes Unknown, Entered By History   insulin aspart (NOVOLOG PEN) 100 UNIT/ML pen Inject 1-10 Units Subcutaneous 3 times daily (with meals) Correction Scale - custom DOSING     Do Not give Correction Insulin if Pre-Meal BG less than 140    to 159 give 1 units.    to 179 give 2 units.    to 199 give 3 units.    to 219 give 4 units.    to 239 give 5 units.    to  259 give 6 units.    to 279 give 7 units.    to 299 give 8 units.    to 319 give 9 units.    to 339 give 10 units.   To be given with prandial insulin, and based on pre-meal blood glucose.   Notify provider if glucose greater than or equal 340 mg/dL after administration. If given at mealtime, administer within 30 minutes of start of meal 12/2/2019 at Unknown time Yes Wendy Donahue NP   insulin degludec (TRESIBA) 200 UNIT/ML pen Inject 14 Units Subcutaneous daily  12/2/2019 at Unknown time Yes Maximo Tucker DO   loperamide (IMODIUM) 2 MG capsule Take 1 capsule (2 mg) by mouth 4 times daily as needed for diarrhea Past Month at Unknown time Yes Wendy Donahue NP   Multiple Vitamin (THERAVITE PO) Take 1 tablet by mouth every morning  12/2/2019 at Unknown time Yes Reported, Patient   mycophenolic acid (GENERIC EQUIVALENT) 180 MG EC tablet Take 3 tablets (540 mg) by mouth every 12 hours 12/2/2019 at Unknown time Yes Александр Ramos MD   omeprazole (PRILOSEC) 40 MG DR capsule Take 1 capsule (40 mg) by mouth daily 12/2/2019 at Unknown time Yes Natalie Chun MD   ondansetron (ZOFRAN-ODT) 4 MG ODT tab Take 1 tablet (4 mg) by mouth every 6 hours as needed for nausea or vomiting Past Week at Unknown time Yes Wendy Donahue NP   oxyCODONE (ROXICODONE) 5 MG tablet Take 1 tablet (5 mg) by mouth every 6 hours as needed for moderate to severe pain Past Week at Unknown time Yes eWndy Donahue NP   sulfamethoxazole-trimethoprim (BACTRIM/SEPTRA) 400-80 MG tablet Take 1 tablet by mouth daily 12/2/2019 at Unknown time Yes Wendy Donahue NP   tacrolimus (GENERIC EQUIVALENT) 1 MG capsule Take 2 capsules (2 mg) by mouth 2 times daily 12/2/2019 at Unknown time Yes Mohamud Lu MD   tamsulosin (FLOMAX) 0.4 MG capsule TAKE 1 CAPSULE(0.4 MG) BY MOUTH DAILY 12/2/2019 at Unknown time Yes Natalie Chun MD   tenofovir (VIREAD) 300 MG tablet Take 1  tablet (300 mg) by mouth daily 12/2/2019 at Unknown time Yes Wendy Donahue NP   traZODone (DESYREL) 50 MG tablet Take 1 tablet (50 mg) by mouth At Bedtime 12/2/2019 at Unknown time Yes Natalie Chun MD   valGANciclovir (VALCYTE) 450 MG tablet Take 1 tablet (450 mg) by mouth daily 12/2/2019 at Unknown time Yes Wendy Donahue NP   vitamin B-12 (CYANOCOBALAMIN) 1000 MCG tablet Take 1,000 mcg by mouth daily  12/2/2019 at Unknown time Yes Unknown, Entered By History   vitamin D3 (CHOLECALCIFEROL) 2000 units (50 mcg) tablet Take 1 tablet by mouth daily 12/2/2019 at Unknown time Yes Unknown, Entered By History   BD VIKTORIA U/F 32G X 4 MM insulin pen needle Use 5 per day   Jennifer Wilcox MD   blood glucose (ACCU-CHEK EDINSON PLUS) test strip USE TO TEST FOUR TIMES DAILY OR AS DIRECTED   Jennifer Wilcox MD   blood glucose monitoring (ACCU-CHEK EDINSON PLUS) test strip Use to test blood sugar 4 times daily   Natalie Chun MD   blood glucose monitoring (ACCU-CHEK FASTCLIX) lancets Use to test blood sugar 4 times daily or as directed.  1 box = 102 lancets   Natalie Chun MD   insulin aspart (NOVOLOG PEN) 100 UNIT/ML pen Inject 1-11 Units Subcutaneous At Bedtime Correction Scale - custom DOSING (1:20)   to 219 give 1 units.    to 239 give 2 units.    to 259 give 3 units.    to 279 give 4 units.    to 299 give 5 units.    to 319 give 6 units.    to 339 give 7 units.    to 359 give 8 units    to 379 give 9 units.   to 399 give 10 units.  BG >/=400 give 11 units.  Notify provider if glucose greater than or equal to 400 mg/dL after administration. If given at mealtime, administer within 30 minutes of start of meal 12/1/2019  Wendy Donahue NP   order for DME Equipment being ordered: Cane ()  Treatment Diagnosis: impaired gait   Sarumi, Maryanne Mitzi, PA-C   rosuvastatin (CRESTOR) 5  MG tablet Take 1 tablet (5 mg) by mouth daily 12/2/2019 at Unknown time Yes Wendy Donahue NP         Medication history completed by:   Emma Leija, [facility] [employer], PD4  December 3, 2019     I have reviewed, edited, discussed and agree with above medication history.  Malena Hicks, Pharm.D., [facility]  Pager [phone]

## 2019-12-03 NOTE — PROGRESS NOTES
"CLINICAL NUTRITION SERVICES - ASSESSMENT NOTE     Nutrition Prescription    RECOMMENDATIONS FOR MDs/PROVIDERS TO ORDER:  Recommend lowering vitamin B12 dose to 500 mcg (standard dose) as recent levels are above normal limits.     Malnutrition Status:    Severe malnutrition in the context of acute on chronic illness    Recommendations already ordered by Registered Dietitian (RD):  Ordered to start Nepro (d/t hyperkalemia) @ 10 ml/hr and advancing by 10 ml/hr q 6 hours to goal rate of 60 ml/hr.     Check zinc level (d/t dysgeusia impacting PO intake)    Continue calorie counts (12/3-12/5)    Change oral supplement to Boost Glucose Control with meals (vanilla/marquis)    Future/Additional Recommendations:  Once TF at goal rate with good tolerance, will plan to cycle TF during evening hours (6p-6a) per patient's preference.  Rate will depend on calorie count values.      Boost Glucose Control should be the ONLY supplement patient consumes at discharge home.  He has been counseled on in detail today.  He has been consuming Glucerna 4x/day (each contains 470 mg K+).      REASON FOR ASSESSMENT  Frandy Workman is a/an 54 year old male assessed by the dietitian for Provider Order - Registered Dietitian to Assess and Order TF per Medical Nutrition Therapy Protocol    NUTRITION HISTORY  Patient well known to writer from recent hospitalization following his liver transplant 11/11/19.      Patient was discharged with his friend and caregiver, Art.  However Art left to go back to [place] and patient does not feel he is able to care for himself.  He is eating poorly and skipping meals (no food on hand).  He has been giving his insulin at the start of his meals, but not eating his full meal and therefore dealing with hypoglycemia.  He has been nauseated.      Patient reports he has been drinking Glucerna supplements (180 kcal, 10 grams protein, 16 grams CHO, 470 mg K+) 4 times per day lately.  He reports he was told to \"eat " "whatever he wants\".  He has been eating Edin Hakeem breakfast sandwiches for breakfast but recently ran out of those.  He drinks milk, eats yogurt, has crackers with PB and bananas for snacks.  He has been consuming high amounts of K+ (likely 3-4 grams) lately. He reports being very hungry when he first wakes up but it gets harder to eat as the day goes on.  Food doesn't taste as it used to and patient also has some nausea that impacts eating.  He does not cook and gets food/supplements delivered to him.  He mainly relies on lean cuisine meals and packaged breakfast sandwiches. He does have some help with setting up his meals/snacks from a friend.     CURRENT NUTRITION ORDERS  Diet: 2 g Potassium  Calorie counts 12/3-12/5    FT was placed today.  He understands the need for it, but is concerned about his BG and his ability to eat well with TF running.      LABS  K+ 6.0 (high) - 12/3  Cre up from baseline    MEDICATIONS  Med sliding scale insulin + Novolog 1 unit per 10 grams CHO  Vitamin D 2000 IU  Ferrous Sulfate 325 mg TID with meals  Vitamin B12 5000 mcg daily  Alpha-lipoic acid 600 mg    ANTHROPOMETRICS  Height: 5'9\"  Most Recent Weight: 74.3 kg (163 lb 11.2 oz)    IBW: 72.7 kg  BMI: Normal BMI  Weight History: Weight loss of 2.8 kg (3.6%) over the last 3 weeks.  Overall, has lost ~26# in the last 10 months (14%)  Dosing Weight: 74 kg    ASSESSED NUTRITION NEEDS  Estimated Energy Needs: 3625-6716 kcals/day (30 - 35 kcals/kg )  Justification: Increased needs post-op liver transplant  Estimated Protein Needs: 111-148 grams protein/day (1.5 - 2 grams of pro/kg)  Justification: Increased needs post-op liver transplant  Estimated Fluid Needs: per MD     PHYSICAL FINDINGS  See malnutrition section below.    MALNUTRITION  % Intake: < 75% for > 7 days (non-severe)  % Weight Loss: Up to 1-2% in 1 week (non-severe)  Subcutaneous Fat Loss: Facial region:  moderate and Lower arm: moderate  Muscle Loss: Temporal: " moderate-severe, Facial & jaw region: moderate-severe, Upper arm (bicep, tricep): moderate, Lower arm  (forearm): moderate, Upper leg (quadricep, hamstring):  moderate, Patellar region: moderate and Posterior calf: mild-moderate  Fluid Accumulation/Edema: None noted  Malnutrition Diagnosis: Severe malnutrition in the context of acute on chronic illness    NUTRITION DIAGNOSIS  Inadequate oral intake related to nausea, dysgeusia, lack of access to food as evidenced by patient with weight loss of 3.6% in 3 weeks.      INTERVENTIONS  Implementation  Nutrition Education: Provided education on RD role, K+ content in foods/oral supplements.  Reviewed post-transplant diet guidelines.  Discussed TF role/plan.       Enteral Nutrition - Initiate (see above)    Goals  Total avg nutritional intake to meet a minimum of 30 kcal/kg and 1.5 g PRO/kg daily (per dosing wt 74 kg).     Monitoring/Evaluation  Progress toward goals will be monitored and evaluated per protocol.    Wendy Crandall MS, RD, LD  Pager [phone]

## 2019-12-03 NOTE — PLAN OF CARE
Discharge Planner PT   Patient plan for discharge: TCU  Current status: Pt amb ~ 300 feet with CGA, mildy unsteady and difficulty at times sequencing SPC. Pt tx supine->sit with logroll technique with SBA. Pt participated in standing LE Therex x 10 reps.   Barriers to return to prior living situation: decreased strength, impaired balance, activity intolerance, lives alone, home with 20 stairs to bedroom level  Recommendations for discharge: TCU  Rationale for recommendations: Pt would benefit from additional skilled PT to address functional mobility, transfers, gait, balance, safety and stairs.        Entered by: Wendy Huntley 12/03/2019 4:41 PM

## 2019-12-03 NOTE — CONSULTS
12/03/19 1601 Lisa Ordonez, CHAVEZ       Patient seen at bedside for review of CHO counting , sliding scale and insulin administration. Reviewed several examples of CHO counting with meals and snack and sliding scale. All examples he calculated correct insulin dose. States his biggest concern is taking insulin before he eats then doesn't finish his meal, stating he was told by his care team  prior to  transplant to take insulin before he eats. Discussed that plan is ok once he is off tube feedings and has his appetite back. He understands he can take insulin  immediately after his meal once he calculates what  he actually ate. Discussed long and short acting insulin, basic diabetic skills and some meal examples with carbs. Seems to understand all information discussed. Was encouraged to practice correction scale and CHO counting with nursing staff based on current orders. Literature given: Information on Long and short acting insulin, Understanding  Diabetes, Diabetes Management Flow Sheet

## 2019-12-03 NOTE — PHARMACY-CONSULT NOTE
Pharmacy Tube Feeding Consult    Medication reviewed for administration by feeding tube and for potential food/drug interactions.    If patient requires medications be administered via feeding tube the following medications should NOT be crushed:  mycophenolic acid, tacrolimus, valganciclovir, aspirin-EC and omeprazole.  Tamsulosin is not recommended to be given via feeding tubes due to clogging of tubes. Consider changing to doxazosin 1mg via FT daily if liquid formulation required.    Please contact pharmacy for assistance in changing medications to liquid or crushable form if needed for feeding tube administration. Pharmacy will continue to follow as new medications are ordered.    Emma Leija, [facility] [employer], PD4  December 3, 2019       I have edited, reviewed, and agree with above assessment/recommendation.  Malena Hikcs, Pharm.D., [facility]  Pager [phone]

## 2019-12-03 NOTE — PROGRESS NOTES
Care Coordinator  D/I: LILLIAN Zayas, on 12/2 informed me to plan for pt having a NJT placed on 12/3.  I called [facility] this morning [phone]--Pt has medicare and [employer]ARP and will NOT meet criteria for payment--pt will be self pay--I have informed 7A dietician Giuliano Crandall and he needs to be on Nepro for high potassium.  A: pt with inadequate nutrition post LT and will need enteral feeds for healing and maximum nutrition.  PT/OT to work with pt--best plan is for a TCU stay otherwise need to obtain self pay quote, and [facility] for teaching.

## 2019-12-03 NOTE — CONSULTS
"  [facility], [place]   Initial Psychiatric Consult   Consult date: December 3, 2019         Reason for Consult, requesting source:    Thoughts to \"end it all\" by ceasing pharmacotherapy   Requesting source: Berlin Hernandez        HPI:     Mr. Frandy Workman is a 54-year-old male admitted on 12/2/19 for JERONIMO, nausea, diarrhea, confusion, and weakness. Received liver transplant on 11/11/19, which was complicated by hematoma evacuation. PMH significant for cirrhosis secondary to ESTRADA dx in 2013, HCC 2018, HTN, HLD, GERD, BPH, DMII. Past psychiatric history notable for possible bipolar disorder.     Mr. Workman is now managing himself at home with 3 hours of PCA per day. His post-transplant caregiver, Miller, returned to [place] about a week ago. Patient acknowledges making a comment to his nurse yesterday that he has had thoughts of stopping his immunosuppressants to \"end it all.\" He denies that he would ever act on these thoughts. He reports having some difficulty adjusting to life after liver transplantation. He has been experiencing some cognitive deficits, which has made it difficult to function at the same level as before the transplant. He notices some confusion during the day which seems to wax and wane. He is normally a very organized person but now has difficulty with simple things, such as making sure bills are paid. He feels as though he would not even be able to write a check in his current state. This, in turn, has led to him feeling more down in regard to mood. He also complains of feeling quite anxious due to the various unknowns with his medication regimen and physical health. He says he tends to worry about what will happen \"8 days from now.\"     In terms of his current symptoms, he is sleeping about 8 to 10 hours per night, in four hour increments. He will be awake for about 1-2 hours during the middle of the night. His appetite has been very low and has been attempting " "to force himself to eat. He denies any loss of interest in his usual activities, stating he enjoys following college sports. He is having more difficulty with concentration since the transplant. He denies any thoughts of wanting to end his life. He does feel rather isolated, does not have contact with family, and his post-transplant care-taker has returned to [place]. His main complaint at this time appears to be his excessive worry and anxiety, which he finds difficult to control.     His psychiatric history is a bit unclear. 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 He would often work 100 hours a week for up to 2 months. He reports during these times, he would only require about 3 hours of sleep per night. He reports engaging in risky and impulsive behavior, such as sexual promiscuity and would go on spending sprees. After these episodes, he reports he would then \"crash\" and begin sleeping for 15 hours per night and have difficulty functioning. He has been off and on various medications since his 20s, including mood stabilizers, antidepressants, and antipsychotics. He had been treated with lithium for about 15 years and said fluoxetine would be added and stopped for episodes of depression. It is a bit difficult to gather an accurate timeline and psychiatric notes are not found in Care Everywhere.         Past Psychiatric History:     Psychiatric hospitalizations: x 1 in 2014 in [place] - states he \"could not handle my dad and brother\"    Suicide/self harm attempts: possible intentional lithium overdose x 1 in 2014     Prescriber: No current psychiatric provider    Therapist: No current therapist    Medication trials:  Mood stabilizers: lithium, depakote  Antipsychotics: olanzapine, risperidone  Antidepressants: fluoxetine, trazodone        Substance Use " "and History:     Denies history of use of street drugs or unprescribed medication. Went on alcohol \"benders\" with friends in his 20s. No current alcohol use. Former smokeless tobacco user.         Past Medical History:   PAST MEDICAL HISTORY:   Past Medical History:   Diagnosis Date     Anemia 2013    Low blood plates current is 37     Arthritis      BPH (benign prostatic hyperplasia)      CAD (coronary artery disease) 4/1/2019     Cholelithiasis      Conductive hearing loss 8/16/2017    Have a lump on my right side of my face.  Had wax discharge     Depressive disorder 1986    Suffer effects throughout life     Gastroesophageal reflux disease 12/1/2014    Being treated with Prilosac     HCC (hepatocellular carcinoma) (H) 1/22/2019     History of diabetic retinopathy 07/2018     HTN (hypertension)      HTN (hypertension) 11/20/2019     Hyperlipidemia      Liver cirrhosis secondary to ESTRADA (H)      Portal vein thrombosis 4/11/2019     Type II diabetes mellitus (H)     Insulin adminstered BID daily.        PAST SURGICAL HISTORY:   Past Surgical History:   Procedure Laterality Date     COLONOSCOPY      2015     CV HEART CATHETERIZATION WITH POSSIBLE INTERVENTION N/A 2/26/2019    Procedure: CORS;  Surgeon: Jagdish Hoyt MD;  Location: [facility] CARDIAC CATH LAB     ESOPHAGOSCOPY, GASTROSCOPY, DUODENOSCOPY (EGD), COMBINED N/A 11/17/2016    Procedure: COMBINED ESOPHAGOSCOPY, GASTROSCOPY, DUODENOSCOPY (EGD);  Surgeon: Santi Rosas MD;  Location:  GI     ESOPHAGOSCOPY, GASTROSCOPY, DUODENOSCOPY (EGD), COMBINED N/A 11/17/2017    Procedure: COMBINED ESOPHAGOSCOPY, GASTROSCOPY, DUODENOSCOPY (EGD);  EGD;  Surgeon: Santi Rosas MD;  Location:  GI     ESOPHAGOSCOPY, GASTROSCOPY, DUODENOSCOPY (EGD), COMBINED N/A 12/28/2018    Procedure: EGD;  Surgeon: Santi Rosas MD;  Location:  OR     HEAD & NECK SURGERY      12/2017 at [facility].      IMPLANT GOLD WEIGHT EYELID Right 11/16/2017    " Procedure: IMPLANT WEIGHT EYELID;  Right Upper Eyelid Weight, right tarsal strip lower eyelid;  Surgeon: Milana Malave MD;  Location: [facility] OR     IR CHEMO EMBOLIZATION  2019     KNEE SURGERY Left      ORTHOPEDIC SURGERY       PAROTIDECTOMY, RADICAL NECK DISSECTION Right 2017    Procedure: PAROTIDECTOMY, RADICAL NECK DISSECTION;  Right Superfacial Parotidectomy , Facial nerve repair. with NIM facial nerve monitor.;  Surgeon: Asiya Morgan MD;  Location: [facility] OR     PICC INSERTION Left 2017    4fr SL BioFlo PICC, 44cm in the L basilic vein w/ tip in the low SVC     RETURN LIVER TRANSPLANT N/A 2019    Procedure: Exploratory laparotomy, hematoma evacuation, abdominal washout;  Surgeon: Александр Ramos MD;  Location: [facility] OR     TRANSPLANT LIVER RECIPIENT  DONOR N/A 2019    Procedure: TRANSPLANT, LIVER, RECIPIENT,  DONOR;  Surgeon: Александр Ramos MD;  Location: [facility] OR     VASCULAR SURGERY               Family History:   FAMILY HISTORY:   Family History   Problem Relation Age of Onset     Prostate Cancer Maternal Grandfather      Substance Abuse Maternal Grandfather         Alcohol     Colon Cancer Father 60     Pancreatic Cancer Father 60     Prostate Cancer Father      Colorectal Cancer Father      Macular Degeneration Father      Cancer Father      Glaucoma Father      Skin Cancer Father      Colorectal Cancer Maternal Grandmother      Cancer Maternal Grandmother      Substance Abuse Maternal Grandmother         Alcohol     Colorectal Cancer Paternal Grandmother      Cancer Mother      Diabetes Mother          3/2016     Cerebrovascular Disease Mother         Passed away in Feb of this year, 80 years old.     Thyroid Disease Mother      Depression Mother      Asthma Sister         Had since birth     Thyroid Disease Sister      Depression Sister      Liver Disease No family hx of      Melanoma No family hx of      Mother - depression  Brother -  depression  Sister - depression        Social History:   SOCIAL HISTORY:     Mr. Workman lives alone here in [place]. Used to live in [place], however moved to [place] to be closer to daughter, who is age 18. Daughter now does not talk to him. He does not have contact with other family members. His wife passed away about 10 years ago. He used to work as a CPA.          Physical ROS:   The patient endorsed loss of appetite, cognitive slowing, occasional incisional pain. The remainder of 10-point review of systems was negative except as noted in HPI.         PTA Medications:     Facility-Administered Medications Prior to Admission   Medication Dose Route Frequency Provider Last Rate Last Dose     Aflibercept (EYLEA) injection 2 mg  2 mg Intravitreal Q28 Days Enriqueta Martin MD   2 mg at 06/27/19 1303     ranibizumab (LUCENTIS) injection syringe 0.5 mg  0.5 mg Intravitreal Q28 Days Enriqueta Martin MD         Medications Prior to Admission   Medication Sig Dispense Refill Last Dose     alpha-lipoic acid 600 MG capsule Take 1 capsule (600 mg) by mouth daily 90 capsule 3 12/2/2019 at Unknown time     Artificial Tear Solution (SM ARTIFICIAL TEARS) SOLN Place 1 drop into the right eye every hour as needed Apply at least 4 times daily and as needed for dry eye 1 Bottle 3 12/2/2019 at Unknown time     aspirin (ASA) 325 MG EC tablet Take 1 tablet (325 mg) by mouth daily 30 tablet 3 12/2/2019 at Unknown time     carvedilol (COREG) 25 MG tablet Take 0.5 tablets (12.5 mg) by mouth 2 times daily (with meals) 60 tablet 3 12/2/2019 at Unknown time     econazole nitrate 1 % external cream Apply topically daily To feet and toenails. 85 g 0 Past Month at Unknown time     ferrous sulfate (FEROSUL) 325 (65 Fe) MG tablet Take 1 tablet (325 mg) by mouth 3 times daily (with meals) 90 tablet 3 12/2/2019 at Unknown time     glucose (BD GLUCOSE) 4 g chewable tablet Take 1 tablet by mouth every hour as needed for low  blood sugar 60 tablet 1 Past Month at Unknown time     insulin aspart (NOVOLOG FLEXPEN) 100 UNIT/ML pen Inject 1-7 Units Subcutaneous 3 times daily (with meals) DOSE:  1 units per 10 grams of carbohydrate.  Only chart total amount of units given.  Do not give if pre-prandial glucose is less than 60 mg/dL. If given at mealtime, administer within 30 minutes of start of meal   12/2/2019 at Unknown time     insulin aspart (NOVOLOG PEN) 100 UNIT/ML pen Inject 1-7 Units Subcutaneous Take with snacks or supplements for high blood sugar DOSE:  1 units per 5 grams of carbohydrate. Only chart total amount of units given.  Do not give if pre-prandial glucose is less than 60 mg/dL. If given at mealtime, administer within 30 minutes of start of meal        insulin aspart (NOVOLOG PEN) 100 UNIT/ML pen Inject 1-10 Units Subcutaneous 3 times daily (with meals) Correction Scale - custom DOSING     Do Not give Correction Insulin if Pre-Meal BG less than 140    to 159 give 1 units.    to 179 give 2 units.    to 199 give 3 units.    to 219 give 4 units.    to 239 give 5 units.    to 259 give 6 units.    to 279 give 7 units.    to 299 give 8 units.    to 319 give 9 units.    to 339 give 10 units.   To be given with prandial insulin, and based on pre-meal blood glucose.   Notify provider if glucose greater than or equal 340 mg/dL after administration. If given at mealtime, administer within 30 minutes of start of meal 3 mL  12/2/2019 at Unknown time     insulin degludec (TRESIBA) 200 UNIT/ML pen Inject 14 Units Subcutaneous daily  100 mL 3 12/2/2019 at Unknown time     loperamide (IMODIUM) 2 MG capsule Take 1 capsule (2 mg) by mouth 4 times daily as needed for diarrhea 20 capsule 1 Past Month at Unknown time     Multiple Vitamin (THERAVITE PO) Take 1 tablet by mouth every morning    12/2/2019 at Unknown time     mycophenolic acid (GENERIC EQUIVALENT) 180 MG EC tablet Take 3  tablets (540 mg) by mouth every 12 hours 180 tablet 3 12/2/2019 at Unknown time     omeprazole (PRILOSEC) 40 MG DR capsule Take 1 capsule (40 mg) by mouth daily 90 capsule 2 12/2/2019 at Unknown time     ondansetron (ZOFRAN-ODT) 4 MG ODT tab Take 1 tablet (4 mg) by mouth every 6 hours as needed for nausea or vomiting 10 tablet 0 Past Week at Unknown time     oxyCODONE (ROXICODONE) 5 MG tablet Take 1 tablet (5 mg) by mouth every 6 hours as needed for moderate to severe pain 20 tablet 0 Past Week at Unknown time     rosuvastatin (CRESTOR) 5 MG tablet Take 1 tablet (5 mg) by mouth daily 30 tablet 3 12/2/2019 at Unknown time     sulfamethoxazole-trimethoprim (BACTRIM/SEPTRA) 400-80 MG tablet Take 1 tablet by mouth daily 30 tablet 11 12/2/2019 at Unknown time     tacrolimus (GENERIC EQUIVALENT) 1 MG capsule Take 2 capsules (2 mg) by mouth 2 times daily 120 capsule 11 12/2/2019 at Unknown time     tamsulosin (FLOMAX) 0.4 MG capsule TAKE 1 CAPSULE(0.4 MG) BY MOUTH DAILY 90 capsule 3 12/2/2019 at Unknown time     tenofovir (VIREAD) 300 MG tablet Take 1 tablet (300 mg) by mouth daily 30 tablet 11 12/2/2019 at Unknown time     traZODone (DESYREL) 50 MG tablet Take 1 tablet (50 mg) by mouth At Bedtime 90 tablet 1 12/2/2019 at Unknown time     valGANciclovir (VALCYTE) 450 MG tablet Take 1 tablet (450 mg) by mouth daily 30 tablet 5 12/2/2019 at Unknown time     vitamin B-12 (CYANOCOBALAMIN) 1000 MCG tablet Take 1,000 mcg by mouth daily    12/2/2019 at Unknown time     vitamin D3 (CHOLECALCIFEROL) 2000 units (50 mcg) tablet Take 1 tablet by mouth daily   12/2/2019 at Unknown time     BD VIKTORIA U/F 32G X 4 MM insulin pen needle Use 5 per day 300 each 3 Taking     blood glucose (ACCU-CHEK EDINSON PLUS) test strip USE TO TEST FOUR TIMES DAILY OR AS DIRECTED 400 strip 6 Taking     blood glucose monitoring (ACCU-CHEK EDINSON PLUS) test strip Use to test blood sugar 4 times daily 400 each 3 Taking     blood glucose monitoring (ACCU-CHEK  FASTCLIX) lancets Use to test blood sugar 4 times daily or as directed.  1 box = 102 lancets 408 each 3 Taking     insulin aspart (NOVOLOG PEN) 100 UNIT/ML pen Inject 1-11 Units Subcutaneous At Bedtime Correction Scale - custom DOSING (1:20)   to 219 give 1 units.    to 239 give 2 units.    to 259 give 3 units.    to 279 give 4 units.    to 299 give 5 units.    to 319 give 6 units.    to 339 give 7 units.    to 359 give 8 units    to 379 give 9 units.   to 399 give 10 units.  BG >/=400 give 11 units.  Notify provider if glucose greater than or equal to 400 mg/dL after administration. If given at mealtime, administer within 30 minutes of start of meal   12/1/2019     order for DME Equipment being ordered: Cane ()  Treatment Diagnosis: impaired gait 1 each 0 Taking          Allergies:     Allergies   Allergen Reactions     Codeine Other (See Comments)     Cannot take due to liver  Cannot tolerate oral narcotics     Seasonal Allergies      Sneezing, coughing, runny and itchy eyes          Labs:     Recent Results (from the past 48 hour(s))   Hemoglobin A1c POCT    Collection Time: 12/02/19 12:00 AM   Result Value Ref Range    Hemoglobin A1C 5.1 4.3 - 6.0 %   Iron and iron binding capacity    Collection Time: 12/02/19 10:34 AM   Result Value Ref Range    Iron 88 35 - 180 ug/dL    Iron Binding Cap 248 240 - 430 ug/dL    Iron Saturation Index 36 15 - 46 %   Ferritin    Collection Time: 12/02/19 10:34 AM   Result Value Ref Range    Ferritin 1,353 (H) 26 - 388 ng/mL   CBC with platelets    Collection Time: 12/02/19 10:34 AM   Result Value Ref Range    WBC 6.7 4.0 - 11.0 10e9/L    RBC Count 2.41 (L) 4.4 - 5.9 10e12/L    Hemoglobin 7.6 (L) 13.3 - 17.7 g/dL    Hematocrit 23.4 (L) 40.0 - 53.0 %    MCV 97 78 - 100 fl    MCH 31.5 26.5 - 33.0 pg    MCHC 32.5 31.5 - 36.5 g/dL    RDW 19.9 (H) 10.0 - 15.0 %    Platelet Count 164 150 - 450 10e9/L   Renal panel     Collection Time: 12/02/19 10:34 AM   Result Value Ref Range    Sodium 131 (L) 133 - 144 mmol/L    Potassium 5.5 (H) 3.4 - 5.3 mmol/L    Chloride 106 94 - 109 mmol/L    Carbon Dioxide 14 (L) 20 - 32 mmol/L    Anion Gap 11 3 - 14 mmol/L    Glucose 194 (H) 70 - 99 mg/dL    Urea Nitrogen 86 (H) 7 - 30 mg/dL    Creatinine 3.17 (H) 0.66 - 1.25 mg/dL    GFR Estimate 21 (L) >60 mL/min/[1.73_m2]    GFR Estimate If Black 24 (L) >60 mL/min/[1.73_m2]    Calcium 9.4 8.5 - 10.1 mg/dL    Phosphorus 4.7 (H) 2.5 - 4.5 mg/dL    Albumin 3.0 (L) 3.4 - 5.0 g/dL   Tacrolimus level    Collection Time: 12/02/19 10:34 AM   Result Value Ref Range    Tacrolimus Last Dose 9pm 12     Tacrolimus Level 10.1 5.0 - 15.0 ug/L   Magnesium    Collection Time: 12/02/19 10:34 AM   Result Value Ref Range    Magnesium 2.4 (H) 1.6 - 2.3 mg/dL   AFP tumor marker    Collection Time: 12/02/19 10:34 AM   Result Value Ref Range    Alpha Fetoprotein 2.0 0 - 8 ug/L   Alkaline phosphatase    Collection Time: 12/02/19 10:34 AM   Result Value Ref Range    Alkaline Phosphatase 185 (H) 40 - 150 U/L   ALT    Collection Time: 12/02/19 10:34 AM   Result Value Ref Range    ALT 26 0 - 70 U/L   AST    Collection Time: 12/02/19 10:34 AM   Result Value Ref Range    AST 16 0 - 45 U/L   Bilirubin  total    Collection Time: 12/02/19 10:34 AM   Result Value Ref Range    Bilirubin Total 0.5 0.2 - 1.3 mg/dL   Bilirubin direct    Collection Time: 12/02/19 10:34 AM   Result Value Ref Range    Bilirubin Direct 0.2 0.0 - 0.2 mg/dL   Protein total    Collection Time: 12/02/19 10:34 AM   Result Value Ref Range    Protein Total 7.5 6.8 - 8.8 g/dL   Albumin Random Urine Quantitative with Creat Ratio    Collection Time: 12/02/19 10:40 AM   Result Value Ref Range    Creatinine Urine 58 mg/dL    Albumin Urine mg/L 60 mg/L    Albumin Urine mg/g Cr 102.40 (H) 0 - 17 mg/g Cr   Protein  random urine with Creat Ratio    Collection Time: 12/02/19 10:40 AM   Result Value Ref Range    Protein Random  Urine 0.71 g/L    Protein Total Urine g/gr Creatinine 1.22 (H) 0 - 0.2 g/g Cr   Glucose by meter    Collection Time: 12/02/19  4:48 PM   Result Value Ref Range    Glucose 117 (H) 70 - 99 mg/dL   Potassium    Collection Time: 12/02/19  6:18 PM   Result Value Ref Range    Potassium 5.5 (H) 3.4 - 5.3 mmol/L   Enteric Bacteria and Virus Panel by LEONA Stool    Collection Time: 12/02/19  6:45 PM   Result Value Ref Range    Campylobacter group by LEONA Not Detected NDET^Not Detected    Salmonella species by LEONA Not Detected NDET^Not Detected    Shigella species by LEONA Not Detected NDET^Not Detected    Vibrio group by LEONA Not Detected NDET^Not Detected    Rotavirus A by LEONA Not Detected NDET^Not Detected    Shiga toxin 1 gene by LEONA Not Detected NDET^Not Detected    Shiga toxin 2 gene by LEONA Not Detected NDET^Not Detected    Norovirus I and II by LEONA Not Detected NDET^Not Detected    Yersinia enterocolitica by LEONA Not Detected NDET^Not Detected    Enteric pathogen comment       Testing performed by multiplexed, qualitative PCR using the [employer] Enteric   Pathogens Nucleic Acid Test. Results should not be used as the sole basis for diagnosis,   treatment, or other patient management decisions.     Clostridium difficile toxin B PCR    Collection Time: 12/02/19  6:45 PM   Result Value Ref Range    Specimen Description Feces     C Diff Toxin B PCR Negative NEG^Negative   Rotavirus A by PCR Stool    Collection Time: 12/02/19  6:45 PM   Result Value Ref Range    Rotavirus A by LEONA Canceled, Test credited NDET^Not Detected    Enteric Pathogen Comment Canceled, Test credited    Fecal Lactoferrin    Collection Time: 12/02/19  6:45 PM   Result Value Ref Range    Fecal Lactoferrin Positive (A) NEG^Negative   Glucose by meter    Collection Time: 12/02/19  8:29 PM   Result Value Ref Range    Glucose 143 (H) 70 - 99 mg/dL   Glucose by meter    Collection Time: 12/02/19 10:04 PM   Result Value Ref Range    Glucose 149 (H) 70 - 99  mg/dL   Potassium    Collection Time: 12/03/19 12:26 AM   Result Value Ref Range    Potassium 5.1 3.4 - 5.3 mmol/L   Glucose by meter    Collection Time: 12/03/19  2:01 AM   Result Value Ref Range    Glucose 159 (H) 70 - 99 mg/dL   CBC with platelets differential    Collection Time: 12/03/19  5:36 AM   Result Value Ref Range    WBC 4.4 4.0 - 11.0 10e9/L    RBC Count 2.08 (L) 4.4 - 5.9 10e12/L    Hemoglobin 6.4 (LL) 13.3 - 17.7 g/dL    Hematocrit 20.4 (L) 40.0 - 53.0 %    MCV 98 78 - 100 fl    MCH 30.8 26.5 - 33.0 pg    MCHC 31.4 (L) 31.5 - 36.5 g/dL    RDW 20.3 (H) 10.0 - 15.0 %    Platelet Count 142 (L) 150 - 450 10e9/L    Diff Method Automated Method     % Neutrophils 81.5 %    % Lymphocytes 8.5 %    % Monocytes 6.2 %    % Eosinophils 2.5 %    % Basophils 0.2 %    % Immature Granulocytes 1.1 %    Nucleated RBCs 0 0 /100    Absolute Neutrophil 3.5 1.6 - 8.3 10e9/L    Absolute Lymphocytes 0.4 (L) 0.8 - 5.3 10e9/L    Absolute Monocytes 0.3 0.0 - 1.3 10e9/L    Absolute Eosinophils 0.1 0.0 - 0.7 10e9/L    Absolute Basophils 0.0 0.0 - 0.2 10e9/L    Abs Immature Granulocytes 0.1 0 - 0.4 10e9/L    Absolute Nucleated RBC 0.0    Basic metabolic panel    Collection Time: 12/03/19  5:36 AM   Result Value Ref Range    Sodium 138 133 - 144 mmol/L    Potassium 6.0 (H) 3.4 - 5.3 mmol/L    Chloride 117 (H) 94 - 109 mmol/L    Carbon Dioxide 12 (L) 20 - 32 mmol/L    Anion Gap 9 3 - 14 mmol/L    Glucose 173 (H) 70 - 99 mg/dL    Urea Nitrogen 75 (H) 7 - 30 mg/dL    Creatinine 2.94 (H) 0.66 - 1.25 mg/dL    GFR Estimate 23 (L) >60 mL/min/[1.73_m2]    GFR Estimate If Black 26 (L) >60 mL/min/[1.73_m2]    Calcium 8.9 8.5 - 10.1 mg/dL   Magnesium    Collection Time: 12/03/19  5:36 AM   Result Value Ref Range    Magnesium 2.1 1.6 - 2.3 mg/dL   Phosphorus    Collection Time: 12/03/19  5:36 AM   Result Value Ref Range    Phosphorus 4.1 2.5 - 4.5 mg/dL   Hepatic panel    Collection Time: 12/03/19  5:36 AM   Result Value Ref Range    Bilirubin  "Direct 0.1 0.0 - 0.2 mg/dL    Bilirubin Total 0.3 0.2 - 1.3 mg/dL    Albumin 2.4 (L) 3.4 - 5.0 g/dL    Protein Total 6.4 (L) 6.8 - 8.8 g/dL    Alkaline Phosphatase 148 40 - 150 U/L    ALT 21 0 - 70 U/L    AST 13 0 - 45 U/L   ABO/Rh type and screen    Collection Time: 12/03/19  6:30 AM   Result Value Ref Range    ABO O     RH(D) Pos     Antibody Screen Neg     Test Valid Only At          [facility]    Specimen Expires 12/06/2019    Hemoglobin    Collection Time: 12/03/19  6:44 AM   Result Value Ref Range    Hemoglobin 7.3 (L) 13.3 - 17.7 g/dL   Glucose by meter    Collection Time: 12/03/19  7:17 AM   Result Value Ref Range    Glucose 150 (H) 70 - 99 mg/dL          Physical and Psychiatric Examination:     /60   Pulse 94   Temp 97.9  F (36.6  C)   Resp 18   Wt 74.3 kg (163 lb 11.2 oz)   SpO2 100%   BMI 24.17 kg/m    Weight is 163 lbs 11.2 oz  Body mass index is 24.17 kg/m .    Physical Exam:  I have reviewed the physical exam as documented by by the medical team and agree with findings and assessment and have no additional findings to add at this time.    Mental Status Exam:  Appearance: calmly seated in chair, wearing hospital gown, in no acute distress  Behavior: calm and cooperative throughout interview  Orientation: awake and oriented to time, place and person  Mood: \"a bit down\"  Affect: calm, neutral, within a normal range  Speech: normal rate, tone, and volume  Memory: grossly intact as evidenced by ability to recall recent and remote events  Concentration: attentive to interview  Thought process and content: a bit circumstantial, overall linear and goal-directed. Denies auditory or visual hallucinations. No delusions or paranoia.  Insight: good, knowledgeable about condition and medications  Judgement: good, compliant with treatment  Safety: denies any thoughts to harm self or others           DSM-5 Diagnosis:   #1 Adjustment disorder with anxiety and " depressed mood  #2 History of bipolar I disorder  #3 Malnutrition          Assessment:   Mr. Frandy Workman is a 54-year-old male admitted on 12/2/19 for JERONIMO, nausea, diarrhea, confusion, and weakness. Received liver transplant on 11/11/19 and since that time has been complaining of some cognitive deficits and episodes of confusion. Mr. Workman did not appear delirious or confused at the time of interview.     Mr. Workman seems to be experiencing some difficulty with adjusting to life after his transplant. He says the whole process was very quick and he did not have time to think about the decision. At this point, he is willing to take the immunosuppressant drugs and has not intention of stopping them. It seems that he does have quite a bit of anxiety currently and worries about what might happen next. 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 He has been struggling more with maintaining the level of organization he used to be familiar with given some of his recent confusion and cognitive difficulties. It is possible that the immunosuppressant agents could contribute to this.     He does have a unclear history of bipolar disorder. It sounds like most of his manic symptoms occurred in his 20s. He has been on and off medications and has seemed to struggle more with depression over the last 20 years rather than amber, although had been taking lithium for about 15 years.     In light of his more recent symptoms of anxiety, low mood, low appetite, and difficulty maintaining sleep, he may benefit from a trial of mirtazapine, which is less likely to lead to mood cycling if he truly is bipolar. He would also benefit from meeting with a psychotherapist on a regular basis.           Summary of Recommendations:   1) Consider a trial of mirtazapine 15 mg at bedtime to target anxiety, mood, sleep, and appetite.     2) Patient would benefit from psychotherapy to address difficulties adjusting to life  after transplant.     3) Patient appears safe, no intention to act on thoughts of stopping pharmacotherapies, however would benefit from more support in the home to help manage his medical needs given his cognitive difficulties.       Thank you for this consult. Please contact us if any further needs arise. [phone]

## 2019-12-03 NOTE — PLAN OF CARE
OT/7A - Discharge Planner OT   Patient plan for discharge: pt appears open to TCU recommendation  Current status: Reviewed post surgical abdominal precautions. Facilitated functional mobility within room to bathroom with IV pole and stand by assist. Stand by assist for toilet transfer and washing hands at sink. Pt scores 21/30 on MOCA version 8.1 indicating cognitive impairment (a score of 26 or greater is considered normal). Pt endorses insight into cognitive deficits and reports his goals are to be able to pay his own bills and manage his own medications.   Barriers to return to prior living situation: pt lives alone, cognition, activity tolerance  Recommendations for discharge: TCU  Rationale for recommendations: pt would benefit from continued skilled therapy to increase ADL/IADL IND prior to return home, as pt lives alone and will need to be independent before discharge for safety       Entered by: Josefa Acuna 12/03/2019 11:43 AM

## 2019-12-03 NOTE — PLAN OF CARE
/64 (BP Location: Right arm)   Pulse 94   Temp 98.5  F (36.9  C) (Oral)   Resp 18   Wt 73.8 kg (162 lb 11.2 oz)   SpO2 98%   BMI 24.03 kg/m      [id number]. AVSS on RA. Denies nausea. Pt reporting incisional pain, not relieved by heat/cold, lidocane patches, or IV dilaudid.  & 159. RPIV w/ NS @ 125. Fair appetite, regular diet w/ flynn counts. Voiding adequate amounts. No BM overnight. Diabetic education needed. PT/OT consult for today. Up with SBA. Will continue to monitor and update with any changes.

## 2019-12-03 NOTE — PLAN OF CARE
AVSS, denies pain, and blood [id number]=550, 151  Patient up with standby assist, voiding, having loose stools, and tolerating diet with fair appetite (calorie counts documented).  Patient had NJ placed in xray, feeds initiated per order at 10ml/hr.  K=6.0, kayexalate given, placed on telemetry, recheck=5.4, and recheck scheduled for 2000  Staples removed and steri strips placed.  Scheduled meds given as ordered.  Continue to monitor, treat as ordered, notify team with changes.  Patient to have diabetes edu at 1500 with PLC.  Feeds to be increased by 10ml/hr q6hrs to goal rate of 60ml/hr.  Next increased at 8pm to 20ml/hr.  Conditional order to start insulin gtt if blood glucose>250 x2

## 2019-12-03 NOTE — PROGRESS NOTES
12/03/19 1520   Quick Adds   Type of Visit Initial Occupational Therapy Evaluation   Living Environment   Lives With alone   Living Arrangements condominium   Home Accessibility stairs to enter home;stairs within home   Living Environment Comment Per chart review, pt must use stairs to enter and get to bedroom. Per chart review, Art (post transplant caregiver has returned to [place]) and is  no longer staying with pt to assist and pt has been receiving a few hours of PCA services daily.   Self-Care   Usual Activity Tolerance good   Current Activity Tolerance fair   Regular Exercise No   Activity/Exercise/Self-Care Comment Decreased activity tolerance upon return home prior to this admission   Functional Level   Ambulation 1-->assistive equipment   Transferring 0-->independent   Toileting 0-->independent   Dressing 0-->independent   Eating 0-->independent   Communication 0-->understands/communicates without difficulty   Swallowing 0-->swallows foods/liquids without difficulty   Cognition 1 - attention or memory deficits   Which of the above functional risks had a recent onset or change? cognition;bathing   Prior Functional Level Comment Pt reports IND/MOD I for all ADL prior to admission, although increased difficulty.   General Information   Onset of Illness/Injury or Date of Surgery - Date 12/02/19   Referring Physician Maryanne Recio PA-C   Patient/Family Goals Statement wants to be able to pay his own bills and manage his own medications    Additional Occupational Profile Info/Pertinent History of Current Problem Mr. Miller Workman is a 53 yo man with a history of type 2 diabetes complicated by peripheral neuropathy and retinopathy, cirrhosis secondary to ESTRADA, HCC, HTN, HLD, GERD, BPH, who is s/p DBD liver transplant on 11/11/19, who was readmitted 12/2/19 with acute renal failure, confusion, and elevated tacrolimus level.   Precautions/Limitations fall precautions;abdominal precautions   General Observations  "Pt sitting in bedside chair upon arrival, agreeable to therapy.   General Info Comments Activity: up ad karely   Cognitive Status Examination   Orientation orientation to person, place and time   Level of Consciousness alert   Follows Commands (Cognition) WFL   Memory impaired   Organization/Problem Solving Reports problems with problem solving during evaluation;Reports problems with organization   Cognitive Comment Pt reports increased confusion and feeling \"foggy\"   Visual Perception   Visual Perception Wears glasses   Sensory Examination   Sensory Quick Adds No deficits were identified   Pain Assessment   Patient Currently in Pain No   Range of Motion (ROM)   ROM Comment UE WFL   Strength   Strength Comments Generalized deconditioning, not formally tested due to abdominal precautions   Instrumental Activities of Daily Living (IADL)   Previous Responsibilities meal prep;housekeeping;laundry;shopping;medication management;finances;driving   IADL Comments Per chart review, pt has PCA services for a few hours each day.   Activities of Daily Living Analysis   Impairments Contributing to Impaired Activities of Daily Living cognition impaired;balance impaired;strength decreased;post surgical precautions   General Therapy Interventions   Planned Therapy Interventions IADL retraining;cognition;strengthening;transfer training;home program guidelines;progressive activity/exercise;risk factor education;ADL retraining   Clinical Impression   Criteria for Skilled Therapeutic Interventions Met yes, treatment indicated   OT Diagnosis OT order for \"deconditioning, FTT at home\"   Influenced by the following impairments cognition, activity tolerance, post surgical precautions   Assessment of Occupational Performance 1-3 Performance Deficits   Identified Performance Deficits bathing, home mgmt   Clinical Decision Making (Complexity) Low complexity   Therapy Frequency 5x/week   Predicted Duration of Therapy Intervention (days/wks) 1 " "week   Anticipated Equipment Needs at Discharge   (TBD with further therapy)   Anticipated Discharge Disposition Transitional Care Facility;Home with Home Therapy   Risks and Benefits of Treatment have been explained. Yes   Patient, Family & other staff in agreement with plan of care Yes   [facility] AM-PAC  \"6 Clicks\" Daily Activity Inpatient Short Form   1. Putting on and taking off regular lower body clothing? 3 - A Little   2. Bathing (including washing, rinsing, drying)? 3 - A Little   3. Toileting, which includes using toilet, bedpan or urinal? 3 - A Little   4. Putting on and taking off regular upper body clothing? 4 - None   5. Taking care of personal grooming such as brushing teeth? 3 - A Little   6. Eating meals? 4 - None   Daily Activity Raw Score (Score out of 24.Lower scores equate to lower levels of function) 20   Total Evaluation Time   Total Evaluation Time (Minutes) 7     "

## 2019-12-03 NOTE — PROGRESS NOTES
"   12/03/19 1615   Quick Adds   Type of Visit Initial PT Evaluation   Living Environment   Lives With alone   Living Arrangements condominium   Home Accessibility stairs to enter home;stairs within home   Number of Stairs, Main Entrance other (see comments)  (about 20 stairs from garage to main level )   Stair Railings, Main Entrance railings on both sides of stairs   Number of Stairs, Within Home, Primary other (see comments)  (about 20 stairs from main level to bedroom)   Stair Railings, Within Home, Primary railings on both sides of stairs   Transportation Anticipated car, drives self;public transportation   Living Environment Comment Pt lives alone at baseline, after transplant and initial hospital discharge had friend Art staying with him to assist, but Art has returned to [place] now. Pt has 3 hours of PCA services daily.    Self-Care   Usual Activity Tolerance good   Current Activity Tolerance fair   Regular Exercise No   Equipment Currently Used at Home cane, straight   Activity/Exercise/Self-Care Comment Pt was issued an SPC after last hospital admission, has been consistently using, but reports he uses it more at the end of the day, as that is when he \"feels more tired\".    Functional Level Prior   Ambulation 1-->assistive equipment   Transferring 0-->independent   Toileting 0-->independent   Bathing 0-->independent   Communication 0-->understands/communicates without difficulty   Swallowing 0-->swallows foods/liquids without difficulty   Cognition 1 - attention or memory deficits   Fall history within last six months no   Which of the above functional risks had a recent onset or change? ambulation;transferring;toileting;bathing;cognition   Prior Functional Level Comment Pt reports he was previously IND with all functional mobility and transfers, currently uses SPC since transplant.   General Information   Onset of Illness/Injury or Date of Surgery - Date 12/02/19   Referring Physician Maryanne Recio " CLAUDIA Cartagena   Pertinent History of Current Problem (include personal factors and/or comorbidities that impact the POC) Pt is a 54 year old male with PMHx significant for cirrhosis secondary to ESTRADA diagnosed in 2013, HCC 2018, HTN, HLD, GERD, BPH and DMII. S/p liver transplant 11/11/19 complicated by hematoma evacuation on POD 1. Now admitted with JERONIMO, nausea, diarrhea, confusion and weakness over the past week.    Precautions/Limitations abdominal precautions;fall precautions   General Observations Pt resting in supine with eyeglasses donned at PT arrival.    Cognitive Status Examination   Orientation orientation to person, place and time   Level of Consciousness alert   Follows Commands and Answers Questions 100% of the time   Personal Safety and Judgment intact   Memory intact   Integumentary/Edema   Integumentary/Edema no deficits were identifed   Posture    Posture Forward head position;Protracted shoulders   Range of Motion (ROM)   ROM Quick Adds No deficits were identified   Strength   Strength Comments Formal MMT not tested due to abdominal precautions, 3/5 strength observed with functional transfers.    Bed Mobility   Bed Mobility Comments Pt tx supine->sit with SBA.    Transfer Skills   Transfer Comments Pt tx sit->stand with CGA.    Gait   Gait Comments Pt amb ~ 50 feet with SPC and CGA.    Balance   Balance other (describe)   Balance Comments Moderate sway in static standing.    Sensory Examination   Sensory Perception no deficits were identified   General Therapy Interventions   Planned Therapy Interventions bed mobility training;gait training;neuromuscular re-education;strengthening;transfer training;progressive activity/exercise   Clinical Impression   Criteria for Skilled Therapeutic Intervention yes, treatment indicated   PT Diagnosis Impaired Functional Mobility   Influenced by the following impairments decreased strength, activity intolerance, impaired balance   Functional limitations due to  "impairments gait, stairs, transfers, bed mob   Clinical Presentation Stable/Uncomplicated   Clinical Presentation Rationale PMHx, clinical judgement, current presentation   Clinical Decision Making (Complexity) Low complexity   Therapy Frequency 5x/week   Predicted Duration of Therapy Intervention (days/wks) 12/10/19   Anticipated Discharge Disposition Transitional Care Facility   Risk & Benefits of therapy have been explained Yes   Patient, Family & other staff in agreement with plan of care Yes   [facility] AM-PAC  \"6 Clicks\" V.2 Basic Mobility Inpatient Short Form   1. Turning from your back to your side while in a flat bed without using bedrails? 4 - None   2. Moving from lying on your back to sitting on the side of a flat bed without using bedrails? 4 - None   3. Moving to and from a bed to a chair (including a wheelchair)? 3 - A Little   4. Standing up from a chair using your arms (e.g., wheelchair, or bedside chair)? 3 - A Little   5. To walk in hospital room? 3 - A Little   6. Climbing 3-5 steps with a railing? 3 - A Little   Basic Mobility Raw Score (Score out of 24.Lower scores equate to lower levels of function) 20   Total Evaluation Time   Total Evaluation Time (Minutes) 10     "

## 2019-12-04 ENCOUNTER — APPOINTMENT (OUTPATIENT)
Dept: ULTRASOUND IMAGING | Facility: CLINIC | Age: 54
DRG: 682 | End: 2019-12-04
Attending: NURSE PRACTITIONER
Payer: MEDICARE

## 2019-12-04 LAB
ALBUMIN SERPL-MCNC: 2.4 G/DL (ref 3.4–5)
ALBUMIN UR-MCNC: 30 MG/DL
ALP SERPL-CCNC: 160 U/L (ref 40–150)
ALT SERPL W P-5'-P-CCNC: 18 U/L (ref 0–70)
ANION GAP SERPL CALCULATED.3IONS-SCNC: 7 MMOL/L (ref 3–14)
APPEARANCE UR: CLEAR
AST SERPL W P-5'-P-CCNC: 14 U/L (ref 0–45)
BASOPHILS # BLD AUTO: 0 10E9/L (ref 0–0.2)
BASOPHILS # BLD AUTO: 0 10E9/L (ref 0–0.2)
BASOPHILS NFR BLD AUTO: 0.3 %
BASOPHILS NFR BLD AUTO: 0.5 %
BILIRUB DIRECT SERPL-MCNC: 0.1 MG/DL (ref 0–0.2)
BILIRUB SERPL-MCNC: 0.4 MG/DL (ref 0.2–1.3)
BILIRUB UR QL STRIP: NEGATIVE
BLD PROD TYP BPU: NORMAL
BLD UNIT ID BPU: 0
BLOOD PRODUCT CODE: NORMAL
BPU ID: NORMAL
BUN SERPL-MCNC: 61 MG/DL (ref 7–30)
CALCIUM SERPL-MCNC: 8.4 MG/DL (ref 8.5–10.1)
CHLORIDE SERPL-SCNC: 124 MMOL/L (ref 94–109)
CO2 SERPL-SCNC: 15 MMOL/L (ref 20–32)
COLOR UR AUTO: ABNORMAL
CREAT SERPL-MCNC: 2.46 MG/DL (ref 0.66–1.25)
DIFFERENTIAL METHOD BLD: ABNORMAL
DIFFERENTIAL METHOD BLD: ABNORMAL
EOSINOPHIL # BLD AUTO: 0.1 10E9/L (ref 0–0.7)
EOSINOPHIL # BLD AUTO: 0.1 10E9/L (ref 0–0.7)
EOSINOPHIL NFR BLD AUTO: 2.7 %
EOSINOPHIL NFR BLD AUTO: 2.8 %
ERYTHROCYTE [DISTWIDTH] IN BLOOD BY AUTOMATED COUNT: 20.7 % (ref 10–15)
GFR SERPL CREATININE-BSD FRML MDRD: 28 ML/MIN/{1.73_M2}
GLUCOSE BLDC GLUCOMTR-MCNC: 150 MG/DL (ref 70–99)
GLUCOSE BLDC GLUCOMTR-MCNC: 159 MG/DL (ref 70–99)
GLUCOSE BLDC GLUCOMTR-MCNC: 168 MG/DL (ref 70–99)
GLUCOSE BLDC GLUCOMTR-MCNC: 171 MG/DL (ref 70–99)
GLUCOSE BLDC GLUCOMTR-MCNC: 196 MG/DL (ref 70–99)
GLUCOSE SERPL-MCNC: 181 MG/DL (ref 70–99)
GLUCOSE UR STRIP-MCNC: 30 MG/DL
HCT VFR BLD AUTO: 19.1 % (ref 40–53)
HEMOCCULT STL QL IA: POSITIVE
HGB BLD-MCNC: 6 G/DL (ref 13.3–17.7)
HGB BLD-MCNC: 6.5 G/DL (ref 13.3–17.7)
HGB UR QL STRIP: NEGATIVE
IMM GRANULOCYTES # BLD: 0.1 10E9/L (ref 0–0.4)
IMM GRANULOCYTES # BLD: 0.1 10E9/L (ref 0–0.4)
IMM GRANULOCYTES NFR BLD: 1.3 %
IMM GRANULOCYTES NFR BLD: 1.8 %
KETONES UR STRIP-MCNC: NEGATIVE MG/DL
LACTATE BLD-SCNC: 0.5 MMOL/L (ref 0.7–2)
LEUKOCYTE ESTERASE UR QL STRIP: NEGATIVE
LYMPHOCYTES # BLD AUTO: 0.3 10E9/L (ref 0.8–5.3)
LYMPHOCYTES # BLD AUTO: 0.4 10E9/L (ref 0.8–5.3)
LYMPHOCYTES NFR BLD AUTO: 8.6 %
LYMPHOCYTES NFR BLD AUTO: 8.8 %
MAGNESIUM SERPL-MCNC: 1.9 MG/DL (ref 1.6–2.3)
MCH RBC QN AUTO: 31.4 PG (ref 26.5–33)
MCHC RBC AUTO-ENTMCNC: 31.4 G/DL (ref 31.5–36.5)
MCV RBC AUTO: 100 FL (ref 78–100)
MONOCYTES # BLD AUTO: 0.2 10E9/L (ref 0–1.3)
MONOCYTES # BLD AUTO: 0.2 10E9/L (ref 0–1.3)
MONOCYTES NFR BLD AUTO: 4.8 %
MONOCYTES NFR BLD AUTO: 5.3 %
MUCOUS THREADS #/AREA URNS LPF: PRESENT /LPF
NEUTROPHILS # BLD AUTO: 2.8 10E9/L (ref 1.6–8.3)
NEUTROPHILS # BLD AUTO: 3.2 10E9/L (ref 1.6–8.3)
NEUTROPHILS NFR BLD AUTO: 81.3 %
NEUTROPHILS NFR BLD AUTO: 81.8 %
NITRATE UR QL: NEGATIVE
NRBC # BLD AUTO: 0 10*3/UL
NRBC # BLD AUTO: 0 10*3/UL
NRBC BLD AUTO-RTO: 0 /100
NRBC BLD AUTO-RTO: 0 /100
PH UR STRIP: 5 PH (ref 5–7)
PHOSPHATE SERPL-MCNC: 3.6 MG/DL (ref 2.5–4.5)
PLATELET # BLD AUTO: 132 10E9/L (ref 150–450)
POTASSIUM SERPL-SCNC: 4.8 MMOL/L (ref 3.4–5.3)
PROT SERPL-MCNC: 6.2 G/DL (ref 6.8–8.8)
RBC # BLD AUTO: 1.91 10E12/L (ref 4.4–5.9)
RBC #/AREA URNS AUTO: 0 /HPF (ref 0–2)
RETICS # AUTO: 30.9 10E9/L (ref 25–95)
RETICS/RBC NFR AUTO: 1.5 % (ref 0.5–2)
SODIUM SERPL-SCNC: 144 MMOL/L (ref 133–144)
SODIUM SERPL-SCNC: 146 MMOL/L (ref 133–144)
SOURCE: ABNORMAL
SP GR UR STRIP: 1.01 (ref 1–1.03)
TACROLIMUS BLD-MCNC: 7.1 UG/L (ref 5–15)
TME LAST DOSE: NORMAL H
TRANSFUSION STATUS PATIENT QL: NORMAL
TRANSFUSION STATUS PATIENT QL: NORMAL
UROBILINOGEN UR STRIP-MCNC: NORMAL MG/DL (ref 0–2)
WBC # BLD AUTO: 3.4 10E9/L (ref 4–11)
WBC #/AREA URNS AUTO: 1 /HPF (ref 0–5)

## 2019-12-04 PROCEDURE — 25000132 ZZH RX MED GY IP 250 OP 250 PS 637: Mod: GY | Performed by: STUDENT IN AN ORGANIZED HEALTH CARE EDUCATION/TRAINING PROGRAM

## 2019-12-04 PROCEDURE — 80197 ASSAY OF TACROLIMUS: CPT | Performed by: NURSE PRACTITIONER

## 2019-12-04 PROCEDURE — 83735 ASSAY OF MAGNESIUM: CPT | Performed by: PHYSICIAN ASSISTANT

## 2019-12-04 PROCEDURE — 25000132 ZZH RX MED GY IP 250 OP 250 PS 637: Mod: GY | Performed by: NURSE PRACTITIONER

## 2019-12-04 PROCEDURE — 25000131 ZZH RX MED GY IP 250 OP 636 PS 637: Mod: GY | Performed by: NURSE PRACTITIONER

## 2019-12-04 PROCEDURE — 85018 HEMOGLOBIN: CPT | Performed by: NURSE PRACTITIONER

## 2019-12-04 PROCEDURE — 36415 COLL VENOUS BLD VENIPUNCTURE: CPT | Performed by: PHYSICIAN ASSISTANT

## 2019-12-04 PROCEDURE — 84100 ASSAY OF PHOSPHORUS: CPT | Performed by: PHYSICIAN ASSISTANT

## 2019-12-04 PROCEDURE — 36415 COLL VENOUS BLD VENIPUNCTURE: CPT | Performed by: NURSE PRACTITIONER

## 2019-12-04 PROCEDURE — 00000146 ZZHCL STATISTIC GLUCOSE BY METER IP

## 2019-12-04 PROCEDURE — 84295 ASSAY OF SERUM SODIUM: CPT | Performed by: NURSE PRACTITIONER

## 2019-12-04 PROCEDURE — 80048 BASIC METABOLIC PNL TOTAL CA: CPT | Performed by: PHYSICIAN ASSISTANT

## 2019-12-04 PROCEDURE — 12000026 ZZH R&B TRANSPLANT

## 2019-12-04 PROCEDURE — 85025 COMPLETE CBC W/AUTO DIFF WBC: CPT | Performed by: PHYSICIAN ASSISTANT

## 2019-12-04 PROCEDURE — 25000132 ZZH RX MED GY IP 250 OP 250 PS 637: Mod: GY | Performed by: PHYSICIAN ASSISTANT

## 2019-12-04 PROCEDURE — 25000128 H RX IP 250 OP 636: Performed by: PHYSICIAN ASSISTANT

## 2019-12-04 PROCEDURE — 81001 URINALYSIS AUTO W/SCOPE: CPT | Performed by: NURSE PRACTITIONER

## 2019-12-04 PROCEDURE — 25800029 ZZH RX IP 258 OP 250: Performed by: NURSE PRACTITIONER

## 2019-12-04 PROCEDURE — 25800030 ZZH RX IP 258 OP 636: Performed by: PHYSICIAN ASSISTANT

## 2019-12-04 PROCEDURE — 83605 ASSAY OF LACTIC ACID: CPT

## 2019-12-04 PROCEDURE — 84630 ASSAY OF ZINC: CPT | Performed by: PHYSICIAN ASSISTANT

## 2019-12-04 PROCEDURE — 40000141 ZZH STATISTIC PERIPHERAL IV START W/O US GUIDANCE

## 2019-12-04 PROCEDURE — 80076 HEPATIC FUNCTION PANEL: CPT | Performed by: PHYSICIAN ASSISTANT

## 2019-12-04 PROCEDURE — P9016 RBC LEUKOCYTES REDUCED: HCPCS | Performed by: NURSE PRACTITIONER

## 2019-12-04 PROCEDURE — 27210432 ZZH NUTRITION PRODUCT RENAL BASIC LITER

## 2019-12-04 PROCEDURE — 25000131 ZZH RX MED GY IP 250 OP 636 PS 637: Mod: GY | Performed by: PHYSICIAN ASSISTANT

## 2019-12-04 PROCEDURE — 93975 VASCULAR STUDY: CPT | Mod: TC

## 2019-12-04 PROCEDURE — 85045 AUTOMATED RETICULOCYTE COUNT: CPT | Performed by: NURSE PRACTITIONER

## 2019-12-04 PROCEDURE — 82274 ASSAY TEST FOR BLOOD FECAL: CPT | Performed by: NURSE PRACTITIONER

## 2019-12-04 PROCEDURE — 85004 AUTOMATED DIFF WBC COUNT: CPT | Performed by: NURSE PRACTITIONER

## 2019-12-04 RX ORDER — DEXTROSE MONOHYDRATE 100 MG/ML
INJECTION, SOLUTION INTRAVENOUS CONTINUOUS PRN
Status: DISCONTINUED | OUTPATIENT
Start: 2019-12-04 | End: 2019-12-10 | Stop reason: HOSPADM

## 2019-12-04 RX ORDER — SODIUM CHLORIDE 450 MG/100ML
INJECTION, SOLUTION INTRAVENOUS CONTINUOUS
Status: DISCONTINUED | OUTPATIENT
Start: 2019-12-04 | End: 2019-12-08

## 2019-12-04 RX ORDER — NALOXONE HYDROCHLORIDE 0.4 MG/ML
.1-.4 INJECTION, SOLUTION INTRAMUSCULAR; INTRAVENOUS; SUBCUTANEOUS
Status: DISCONTINUED | OUTPATIENT
Start: 2019-12-04 | End: 2019-12-10 | Stop reason: HOSPADM

## 2019-12-04 RX ADMIN — ASPIRIN 325 MG: 325 TABLET, DELAYED RELEASE ORAL at 08:46

## 2019-12-04 RX ADMIN — CYANOCOBALAMIN TAB 1000 MCG 1000 MCG: 1000 TAB at 18:21

## 2019-12-04 RX ADMIN — MYCOPHENOLIC ACID 540 MG: 360 TABLET, DELAYED RELEASE ORAL at 19:51

## 2019-12-04 RX ADMIN — SODIUM CHLORIDE: 4.5 INJECTION, SOLUTION INTRAVENOUS at 23:30

## 2019-12-04 RX ADMIN — TACROLIMUS 2.5 MG: 1 CAPSULE ORAL at 18:22

## 2019-12-04 RX ADMIN — LIDOCAINE 1 PATCH: 560 PATCH PERCUTANEOUS; TOPICAL; TRANSDERMAL at 09:06

## 2019-12-04 RX ADMIN — FERROUS SULFATE TAB 325 MG (65 MG ELEMENTAL FE) 325 MG: 325 (65 FE) TAB at 18:22

## 2019-12-04 RX ADMIN — INSULIN ASPART 1 UNITS: 100 INJECTION, SOLUTION INTRAVENOUS; SUBCUTANEOUS at 15:19

## 2019-12-04 RX ADMIN — MIRTAZAPINE 15 MG: 15 TABLET, FILM COATED ORAL at 22:06

## 2019-12-04 RX ADMIN — INSULIN HUMAN 7 UNITS: 100 INJECTION, SUSPENSION SUBCUTANEOUS at 08:58

## 2019-12-04 RX ADMIN — INSULIN ASPART 1 UNITS: 100 INJECTION, SOLUTION INTRAVENOUS; SUBCUTANEOUS at 17:27

## 2019-12-04 RX ADMIN — SODIUM CHLORIDE: 4.5 INJECTION, SOLUTION INTRAVENOUS at 10:02

## 2019-12-04 RX ADMIN — Medication 600 MG: at 18:22

## 2019-12-04 RX ADMIN — ROSUVASTATIN CALCIUM 5 MG: 5 TABLET, FILM COATED ORAL at 19:53

## 2019-12-04 RX ADMIN — ONDANSETRON 4 MG: 4 TABLET, ORALLY DISINTEGRATING ORAL at 19:51

## 2019-12-04 RX ADMIN — INSULIN ASPART 2 UNITS: 100 INJECTION, SOLUTION INTRAVENOUS; SUBCUTANEOUS at 08:58

## 2019-12-04 RX ADMIN — ONDANSETRON 4 MG: 4 TABLET, ORALLY DISINTEGRATING ORAL at 13:57

## 2019-12-04 RX ADMIN — FERROUS SULFATE TAB 325 MG (65 MG ELEMENTAL FE) 325 MG: 325 (65 FE) TAB at 13:56

## 2019-12-04 RX ADMIN — MELATONIN 2000 UNITS: at 18:21

## 2019-12-04 RX ADMIN — SODIUM CHLORIDE: 9 INJECTION, SOLUTION INTRAVENOUS at 05:09

## 2019-12-04 RX ADMIN — FERROUS SULFATE TAB 325 MG (65 MG ELEMENTAL FE) 325 MG: 325 (65 FE) TAB at 08:47

## 2019-12-04 RX ADMIN — ONDANSETRON 4 MG: 4 TABLET, ORALLY DISINTEGRATING ORAL at 08:47

## 2019-12-04 RX ADMIN — TACROLIMUS 2 MG: 1 CAPSULE ORAL at 08:45

## 2019-12-04 RX ADMIN — MYCOPHENOLIC ACID 540 MG: 360 TABLET, DELAYED RELEASE ORAL at 08:45

## 2019-12-04 RX ADMIN — OMEPRAZOLE 40 MG: 20 CAPSULE, DELAYED RELEASE ORAL at 08:47

## 2019-12-04 ASSESSMENT — ACTIVITIES OF DAILY LIVING (ADL)
ADLS_ACUITY_SCORE: 14
ADLS_ACUITY_SCORE: 16
ADLS_ACUITY_SCORE: 14

## 2019-12-04 ASSESSMENT — PAIN DESCRIPTION - DESCRIPTORS
DESCRIPTORS: ACHING
DESCRIPTORS: ACHING

## 2019-12-04 NOTE — PROGRESS NOTES
Calorie Count  Intake recorded for: 12/3  Total Kcals: 1030 Total Protein: 57g  Kcals from Hospital Food: 1030  Protein: 57g  Kcals from Outside Food (average):0 Protein: 0g  # Meals Recorded: 3 meals (First - 100% scrambled eggs, 50% milk, banana bread, diet lemonade)      (Second - 100% diet lemonade, 50% crumb cod, 25% garden salad)      (Third - 100% pineapple, 50% blueberry muffin)  # Supplements Recorded: 100% 1 Boost Glucose Control

## 2019-12-04 NOTE — PROGRESS NOTES
IP Diabetes Management  Daily Note           Assessment and Plan:   HPI: Mr. Miller Workman is a 53 yo man with a history of type 2 diabetes complicated by peripheral neuropathy and retinopathy, cirrhosis secondary to ESTRADA, HCC, HTN, HLD, GERD, BPH, who is s/p DBD liver transplant on 11/11/19, who was readmitted 12/2/19 with acute renal failure, confusion, and elevated tacrolimus level.     Assessment:   1)Type II Diabetes Mellitus  2)Enteral feed hyperglycemia     Last Tresiba 12/2. BG trending up as TF titrated overnight and tolerating.      Plan:    -NPH 7 units this AM (current estimated basal requirement)   -NPH 19 units this PM (basal plus cont TF)   -for tomorrow: Lantus 14 units daily AM (basal requirement resuming), with evening NPH only to cover cycled tube feeds.    -continue aspart 1:10g CHO coverage with meals and snacks/supplements   -aspart moderate intensity sliding scale AC during the day, and at 2200/0200 overnight (continuous enteral feeds)   -PRN D10% to run at nutritional rate if tube feeds are interrupted, with NPH on board, beginning tonight.    -BG monitoring AC, HS, 0200   -hypoglycemia protocol   -mod CHO diet with carb counting protocol   -diabetes education completed by [facility] upon this admission; may require follow up CDE visit when insulin regimen is known prior to discharge home.     Outpatient follow up: with [facility]  Plan discussed with patient, ISRA.     Interval History and Assessment: interval glucose trend reviewed:  Enteral feeds initiated last afternoon, currently tolerating at 40/hour beginning at 0800.   BG overnight 150>190's with enteral feeds running.   Eating well here. Plan is for tube feeds to reach goal of 60cc/hour tonight at 8PM; will run at 60cc/hour tonight and then shut off tomorrow morning in preparation for 12 hour cycle beginning at 6PM tomorrow.     NPH this morning was dosed only to cover his basal requirement. Anticipate higher BG this afternoon  "relative to continued titration of tube feeds to goal.    Running at goal rate, 9.6g CHO per hour.   For 12 hours, 115g CHO.   For 1/10g CHO ratio, ~12 units NPH to cover continuous feeds running at goal overnight  Plus estimated 7 units basal requirement now that tresiba wearing off= 19 units NPH will be ordered for tonight.     Current nutritional intake and type: Orders Placed This Encounter      2 Gram K Diet      PTA Diabetes Regimen:   [employer] for glucose monitoring and calculating aspart doses-(settings input into meter for calculation, he only has to enter his BG and CHO intake). Settings: \"meal rise\"- 50mg/dL. Snack size-10g, active insulin time 3 hrs.     Insulin regimen: (most recently, though with multiple dose changes since hospital discharge due to hypoglycemia)  Tresiba 15 units daily AM   aspart 1unit/10 g CHO  aspart 1unit/40 for BG >140     Held since transplant:   Metformin ER 500mg with supper  Farxiga 10mg daily    Discharge Planning: unknown, anticipating discharge to TCU.         Diabetes History:   Type of Diabetes: Type 2 Diabetes Mellitus, TPN/Enteral Feeding Induced Hyperglycemia  Lab Results   Component Value Date    A1C 6.6 08/08/2018    A1C 6.5 06/09/2017    A1C 7.8 10/25/2016              Review of Systems:   The Review of Systems is negative other than noted in the Interval History.           Medications:     Current Facility-Administered Medications   Medication     alpha-lipoic acid capsule 600 mg     aspirin (ASA) EC tablet 325 mg     cyanocobalamin (VITAMIN B-12) tablet 1,000 mcg     dextrose 10 % 1,000 mL infusion     glucose gel 15-30 g    Or     dextrose 50 % injection 25-50 mL    Or     glucagon injection 1 mg     ferrous sulfate (FEROSUL) tablet 325 mg     hypromellose-dextran (ARTIFICAL TEARS) 0.1-0.3 % ophthalmic solution 1 drop     insulin 1 unit/mL in saline (NovoLIN, HumuLIN Regular) drip - ADULT IV Infusion     insulin aspart (NovoLOG) inj (RAPID ACTING) "     insulin aspart (NovoLOG) inj (RAPID ACTING)     insulin aspart (NovoLOG) inj (RAPID ACTING)     insulin aspart (NovoLOG) inj (RAPID ACTING)     insulin isophane human (HumuLIN N PEN) injection 7 Units     Lidocaine (LIDOCARE) 4 % Patch 1 patch     lidocaine (LMX4) cream     lidocaine 1 % 1 mL     lidocaine patch in PLACE     lidocaine patch REMOVAL     mirtazapine (REMERON) tablet 15 mg     mycophenolic acid (GENERIC EQUIVALENT) EC tablet 540 mg     omeprazole (priLOSEC) CR capsule 40 mg     ondansetron (ZOFRAN-ODT) ODT tab 4 mg     ondansetron (ZOFRAN-ODT) ODT tab 4 mg     rosuvastatin (CRESTOR) tablet 5 mg     simethicone (MYLICON) chewable tablet 80 mg     sodium chloride (PF) 0.9% PF flush 3 mL     sodium chloride (PF) 0.9% PF flush 3 mL     sodium chloride 0.9% infusion     [START ON 12/5/2019] sulfamethoxazole-trimethoprim (BACTRIM/SEPTRA) 400-80 MG per tablet 1 tablet     tacrolimus (GENERIC EQUIVALENT) capsule 2 mg     tamsulosin (FLOMAX) capsule 0.4 mg     [START ON 12/5/2019] tenofovir (VIREAD) tablet 300 mg     [START ON 12/5/2019] valGANciclovir (VALCYTE) tablet 450 mg     Vitamin D3 (CHOLECALCIFEROL) 25 mcg (1000 units) tablet 2,000 Units            Physical Exam:    /73 (BP Location: Right arm)   Pulse 91   Temp 99  F (37.2  C) (Oral)   Resp 16   Wt 75.4 kg (166 lb 3.2 oz)   SpO2 100%   BMI 24.54 kg/m    General: pleasant, in no distress. Sleeping comfortably   HEENT: normocephalic, atraumatic. Oral mucous membranes moist. NGT bridled, in place.   Lungs: unlabored respiration, no cough  ABD: rounded, soft, no lipodystrophy noted  Skin: warm and dry, no obvious lesions  MSK:  moves all extremities  Lymp:  no LE edema   Mental status: sleeping on visit attempts x2  Psych: sleeping on visit attempts x2            Data:     Recent Labs   Lab 12/04/19  1404 12/04/19  0834 12/04/19  0446 12/04/19  0159 12/03/19  2157 12/03/19  1826 12/03/19  1146  12/03/19  0536  12/02/19  1034   GLC  --   --   181*  --   --   --   --   --  173*  --  194*   * 196*  --  150* 143* 135* 151*   < >  --    < >  --     < > = values in this interval not displayed.     Lab Results   Component Value Date    WBC 3.4 (L) 12/04/2019    WBC 4.4 12/03/2019    WBC 6.7 12/02/2019    HGB 6.0 (LL) 12/04/2019    HGB 7.3 (L) 12/03/2019    HGB 6.4 (LL) 12/03/2019    HCT 19.1 (L) 12/04/2019    HCT 20.4 (L) 12/03/2019    HCT 23.4 (L) 12/02/2019     12/04/2019    MCV 98 12/03/2019    MCV 97 12/02/2019     (L) 12/04/2019     (L) 12/03/2019     12/02/2019     Lab Results   Component Value Date     12/04/2019     (H) 12/04/2019     12/03/2019    POTASSIUM 4.8 12/04/2019    POTASSIUM 4.9 12/03/2019    POTASSIUM 5.4 (H) 12/03/2019    CHLORIDE 124 (H) 12/04/2019    CHLORIDE 117 (H) 12/03/2019    CHLORIDE 106 12/02/2019    CO2 15 (L) 12/04/2019    CO2 12 (L) 12/03/2019    CO2 14 (L) 12/02/2019     (H) 12/04/2019     (H) 12/03/2019     (H) 12/02/2019     Lab Results   Component Value Date    BUN 61 (H) 12/04/2019    BUN 75 (H) 12/03/2019    BUN 86 (H) 12/02/2019     Lab Results   Component Value Date    TSH 0.49 08/08/2018    TSH 0.71 02/08/2018    TSH 0.42 06/09/2017     Lab Results   Component Value Date    AST 14 12/04/2019    AST 13 12/03/2019    AST 16 12/02/2019    ALT 18 12/04/2019    ALT 21 12/03/2019    ALT 26 12/02/2019    ALKPHOS 160 (H) 12/04/2019    ALKPHOS 148 12/03/2019    ALKPHOS 185 (H) 12/02/2019       Diabetes Management Team job code: [id number]  I spent a total of 25 minutes bedside and on the inpatient unit managing glycemic care. Over 50% of my time on the unit was spent counseling the patient and/or coordinating care regarding acute hyperglycemia management.  See note for details.    Lanie Atkinson PA-C  Inpatient Diabetes Management Service  Pager [phone]

## 2019-12-04 NOTE — PLAN OF CARE
/73 (BP Location: Right arm)   Pulse 91   Temp 99  F (37.2  C) (Oral)   Resp 16   Wt 74.3 kg (163 lb 11.2 oz)   SpO2 100%   BMI 24.17 kg/m      T-max: 99.5 oral, tachy 100's, OVSS on RA. Pt. on telemetry & in sinus tachy.  Pt. denies pain or nausea. Pt. up with assist of 1.  Pt. voiding spontaneously, no stools reported this shift. Tube feeds at 30cc/hour into NJT. Increase feeds to 40cc/hour at 0800. Increase tube feeds by 10cc/hour q 6 hours; goal rate is 60cc/hour.  Pt. slept well between cares. Call light within reach. Continue to follow POC & notify provider with change in status.      Addendum:    Pt's Hgb 6.0 this morning & SUSHANT Nelson, notified & ordered stat Hgb.     Recheck Hgb: 6.5 & Omar notified & no orders to transfuse.

## 2019-12-04 NOTE — PROGRESS NOTES
CLINICAL NUTRITION SERVICES - Brief Note    Per calorie counts pt consumed 1030 kcal and 57 g protein yesterday.  Patient TF @ 40 ml/hr (goal = 60 ml/hr).  Should reach goal rate this evening.  Starting tomorrow, will plan to cycle TF @ 60mL/hr x 12 hours (6pm-6am).     New TF regimen will be the following:   Nepro @ goal 60 ml/hr x 12 hrs (720 ml/day) to provide 1296 kcals (18 kcal/kg/day), 58 g PRO (0.8 g/kg/day), 525 ml free H2O, 116 g CHO and 9g Fiber daily.  This will provide 50-60% of patient's assessed needs.      Discussed plan with team, endocrinology and RN.     Continue calorie counts.    Will continue to follow patient to monitor tolerance/adequacy of TF and oral intake.     Lili Hickman  Dietetic Intern    Wendy Crandall MS, RD, LD  Pager [phone]

## 2019-12-04 NOTE — PLAN OF CARE
/67 (BP Location: Right arm)   Pulse 91   Temp 98.7  F (37.1  C) (Oral)   Resp 16   Wt 74.3 kg (163 lb 11.2 oz)   SpO2 100%   BMI 24.17 kg/m    [id number]  Afebrile, intermittently tachycardic 90s-low 100s, all other VSS on RA. K+ recheck was 4.9. BGs 135 & 143, covered per orders. Pt did very well with carb counting this evening. Denied pain and nausea throughout shift. Fair appetite on 2g K+ diet, flynn counts recorded. RPIV going at 125/hr. Voiding adequate amounts, 500 ml recorded with one additional void. Pt had one small BM this shift. Abdominal incision well approximated with steri strips. Up with SBA, ambulated to the bathroom. Please continue to encourage activity. Notify team with any changes.

## 2019-12-04 NOTE — PLAN OF CARE
"/76 (BP Location: Right arm)   Pulse 91   Temp 98.6  F (37  C) (Oral)   Resp 18   Wt 75.4 kg (166 lb 3.2 oz)   SpO2 99%   BMI 24.54 kg/m      Patient tachycardic and hypertensive on RA, BP 130s/70s, heart rate low 100s. afebrile.  and 171 this shift, sliding scale and carb coverage, 7 units of humulin this AM. Reported URQ pain in AM, resolved with rest in PM. Reported dizziness and \"tingling in his head\" this afternoon that resolved with rest. Hg 6.5, 1 unit of PRBC given this AM. Tolerating 2 gram K+ diet with poor appetite. Tube feeds were advanced from 30 ml / hr to 40 ml / hr at 1500, due to increase to goal of 60 ml / hr at 2100. Plan for cycled tube feeds for 6p-6a starting 12/5. Right PIV infusing 1/2 NS at 75 ml / hr. Left PIV SD'd. Voiding adequately, creatinine down to 2.46 from 2.94 today. UA and UC sent to lab. Stool x1 this morning and 1x this afternoon, stool sample sent to lab. Skin is dry and flaky. Blanchable redness noted on buttocks as well as bruising on stomach. Clamshell incision well approximated with adhesive strips. No drainage. Up with an assist of 1, gait belt, and walker. Ambulated with nursing student in [place] today. Liver US done this afternoon. Health psych consulted today. Patient educated on nutrition and Zofran today. Plan for discharge to rehab. Will continue with POC and notify MD with changes or concerns.    "

## 2019-12-04 NOTE — PLAN OF CARE
7A    PT: cancel. Pt not appropriate for PT today, hgb 6.0 and 6.5 on re-check, will reschedule for tomorrow.

## 2019-12-04 NOTE — PROGRESS NOTES
Transplant Surgery  Inpatient Daily Progress Note  12/04/2019    Assessment & Plan: Frandy Workman is a 54 year old male with PMHx significant for cirrhosis secondary to ESTRADA diagnosed in 2013, HCC 2018, HTN, HLD, GERD, BPH and DMII. S/p liver transplant 11/11/19 complicated by hematoma evacuation on POD 1. Now admitted with JERONIMO, nausea, diarrhea, confusion and weakness.     Graft function:   S/p liver transplant: DD OLT 11/11/19. LFTs stable. US liver today d/t RUQ tenderness.  Immunosuppression management:   CC: Myfortic 540 mg BID, changed from Cellcept on 12/2 due to nausea.  FK: Tacrolimus 2 mg BID; goal 10-12 (12 hr trough). Level today 7.1 (11hr trough); increase today.  Complexity of management:Medium. Contributing factors: organ dysfunction and confusion  Hematology:   Anemia: Hgb 6.5. No evidence of bleeding. Check hemoccult stool and retic count. Transfuse 1u RBC.  Cardiorespiratory:   Hypotensive: PTA Coreg discontinued on admission. Continue IVF.  GI/Nutrition:   Severe malnutrition in the context of acute on chronic illness: Minimal intake outpatient due to confusion, no appetite. NJ placed for TF initiation. RD consulted. Supplements and flynn counts ordered. 12/3 flynn count 1030. Plan to cycle TF tomorrow.  Endocrine:   Type II diabetes mellitus: PTA was on Tresiba, carb coverage and sliding scale insulin. Hypoglycemia at home due to poor PO intake. Sliding scale insulin and carb coverage restarted. Hold Tresiba at this time. Endocrinology consulted. -181.   Fluid/Electrolytes:   JERONIMO: Likely r/t decreased PO intake outpatient. Cr 3.2 on admission from 1.9; recheck today 2.5.   Hyperkalemia: D/t tacro, acidosis, JERONIMO. K 6 on 12/3; 30G kayexalate administered. Down to 4.8 today. Continue low K diet.  Hyperchloremia: Cl 117->124. Stop NS and start 1/2 NS @ 75. Add free water to TF. Oral fluid intake is good.   Hypernatremia: Na 138->146. Recheck this afternoon. Question accuracy of lab result. Free  "water started.  Low bicarb: Due to JERONIMO and diarrhea. Improving CO2 12->15.  : No acute issues  Infectious disease: Tmax 99.5F; WBC 3.4. CMV 12/2 not detected. Check UA. Will culture if temp increases.  Diarrhea: Worsening diarrhea prior to admission. C. Diff, enteric viral panel, and rotavirus negative. Fecal lactoferrin positive. Diarrhea improved aside from post-kayexalate stools.  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   Neuro:   Confusion: Patient intermittently confused at home. MOCA indicating cognitive impairment. No confusion today.  Anxiety: Health psychology and psych consulted. Remeron initiated at bedtime per psych recommendations.  Prophylaxis: DVT (mechanical), fall, GI (protonix), viral (Valcyte), pneumocystis (Bactrim)  Disposition: 7A; PT/OT evaluating. Plan for discharge to TCU in 1-2 days.    Medical Decision Making: Medium  Subsequent visit 43660 (moderate level decision making)    SERA/Fellow/Resident Provider: Mellisa Nelson NP     Faculty: Berlin Hernandez M.D.    __________________________________________________________________  Transplant History:    11/11/2019 (Liver), Postoperative day: 23     Interval History: History is obtained from the patient  Fatigued today. RUQ tenderness x3 days. Reporting good oral fluid intake. Feels \"full\" with TF running.     ROS:   A 10-point review of systems was negative except as noted above.    Curent Meds:    alpha-lipoic acid  600 mg Oral Daily     aspirin  325 mg Oral Daily     cyanocobalamin  1,000 mcg Oral Daily     ferrous sulfate  325 mg Oral TID w/meals     insulin aspart  1-5 Units Subcutaneous BID     insulin aspart  1-7 Units Subcutaneous TID AC     insulin aspart   Subcutaneous TID w/meals     [START ON 12/5/2019] insulin glargine  14 Units Subcutaneous QAM     insulin isophane human  26 Units Subcutaneous QPM     lidocaine   Transdermal Q8H     mirtazapine  15 mg Oral At " Bedtime     mycophenolic acid  540 mg Oral Q12H     omeprazole  40 mg Oral Daily     ondansetron  4 mg Oral TID     rosuvastatin  5 mg Oral Daily     sodium chloride (PF)  3 mL Intracatheter Q8H     [START ON 12/5/2019] sulfamethoxazole-trimethoprim  1 tablet Oral Once per day on Tue Thu Sat     tacrolimus  2 mg Oral BID     tamsulosin  0.4 mg Oral Daily     [START ON 12/5/2019] tenofovir  300 mg Oral Q48H     [START ON 12/5/2019] valGANciclovir  450 mg Oral Every Other Day     vitamin D3  2,000 Units Oral Daily       Physical Exam:     Admit Weight: 73.8 kg (162 lb 11.2 oz)    Current Vitals:   /72   Pulse 91   Temp 98.1  F (36.7  C) (Oral)   Resp 18   Wt 75.4 kg (166 lb 3.2 oz)   SpO2 100%   BMI 24.54 kg/m      Vital sign ranges:    Temp:  [97.8  F (36.6  C)-99.5  F (37.5  C)] 98.1  F (36.7  C)  Heart Rate:  [] 102  Resp:  [16-21] 18  BP: (119-135)/(67-73) 129/72  SpO2:  [97 %-100 %] 100 %    General Appearance: in no apparent distress.   Skin: normal, warm, dry, No rashes, induration, or jaundice  Heart: NSR, well-perfused  Lungs: non-labored breathing on RA  Abdomen: Incision stapled and open to air. Abdomen rounded. Mildly tender RUQ.  : Carroll is not present.  Extremities: edema: none  Neurologic: alert, oriented x4. Tremor absent.     Data:   CMP  Recent Labs   Lab 12/04/19  0446 12/03/19  1941  12/03/19  0536   *  --   --  138   POTASSIUM 4.8 4.9   < > 6.0*   CHLORIDE 124*  --   --  117*   CO2 15*  --   --  12*   *  --   --  173*   BUN 61*  --   --  75*   CR 2.46*  --   --  2.94*   GFRESTIMATED 28*  --   --  23*   GFRESTBLACK 33*  --   --  26*   DEBORAH 8.4*  --   --  8.9   MAG 1.9  --   --  2.1   PHOS 3.6  --   --  4.1   ALBUMIN 2.4*  --   --  2.4*   BILITOTAL 0.4  --   --  0.3   ALKPHOS 160*  --   --  148   AST 14  --   --  13   ALT 18  --   --  21    < > = values in this interval not displayed.     CBC  Recent Labs   Lab 12/04/19  0625 12/04/19  0446  12/03/19  0536   HGB 6.5*  6.0*   < > 6.4*   WBC  --  3.4*  --  4.4   PLT  --  132*  --  142*    < > = values in this interval not displayed.     Attestation:    The patient has been seen and evaluated by me.   Vital signs, labs, medications and orders were reviewed.   When obtained, diagnostic images were reviewed by me and interpreted as above.    The care plan was discussed with the multidisciplinary team and I agree with the findings and plan in this note, with any differences recorded in blue.    Immunosuppressive medication management was reviewed and adjusted as reflected in the note and orders.     .

## 2019-12-04 NOTE — CONSULTS
"  Health Psychology                  Clinic    Department of Medicine  Alexus Riley, PhD, LP [phone]                          [facility]  [facility] Carin Franco, PhD, LP [phone]                  3rd Floor  [place] Mail Code [address]41   Eagle Pardo, PhD, ABPP, LP [phone]     [facility], [address],  Lili Lima,  PhD, LP [phone]            [place] [address] Puja Chen, PhD, LP [phone]     Confidential Summary of Inpatient Health Psychology Consultation*    Date of Service:  12/04/19    Referring Provider:  Wendy Donahue NP    Reason for Consultation:  Adjustment following transplant    Sources of Information:  Information was obtained from a clinical interview with the patient and review of medical record.    History of Present Illness:  Frandy Workman is a 54 year old male with PMHx significant for cirrhosis secondary to ESTRADA diagnosed in 2013, HCC 2018, and multiple other medical conditions s/p liver transplant 11/11/19 now admitted with JERONIMO, nausea, diarrhea, confusion and weakness.     He reported significant anxiety following transplant that manifests in worry about ability to manage medications and ability to take care of other ADLs. He also reported physical and cognitive manifestations of anxiety including shortness of breath, dizziness, and difficulty thinking clearly/confusion. He reported that once he begins to feel anxious, he notices confusion, which subsequently feels more anxious and confused. Reports he was previously able to manage finances and other affairs with ease, and the transition s/p transplant has been quite tough. He also reported difficulty transitioning from independence to needing care from others. Has endorsed thoughts of stopping immunosuppressants to \"end it all\" without intent to act on these thoughts. Is isolated as a long-term friend left him about 5 days post " care giving, and while he has a nurse who visits him at home 2 x per week, he worries he cannot cares at home independently. He reports low appetite, spells of dizziness and confusion, and low energy.       Medical History:  See below lists for past medical history, past surgical history, and current medications    Past Medical History:   Diagnosis Date     Anemia 2013     Arthritis      BPH (benign prostatic hyperplasia)      CAD (coronary artery disease) 4/1/2019     Cholelithiasis      Conductive hearing loss 08/16/2017     Depressive disorder 1986    Suffer effects throughout life     Gastroesophageal reflux disease 12/01/2014     HCC (hepatocellular carcinoma) (H) 1/22/2019     History of diabetic retinopathy 07/2018     HTN (hypertension)      HTN (hypertension) 11/20/2019     Hyperlipidemia      Liver cirrhosis secondary to ESTRADA (H)      Liver transplanted (H) 11/11/2019     Portal vein thrombosis 4/11/2019     Type II diabetes mellitus (H)        Past Surgical History:   Procedure Laterality Date     COLONOSCOPY      2015     CV HEART CATHETERIZATION WITH POSSIBLE INTERVENTION N/A 2/26/2019    Procedure: CORS;  Surgeon: Jagdish Hoyt MD;  Location: [facility] CARDIAC CATH LAB     ESOPHAGOSCOPY, GASTROSCOPY, DUODENOSCOPY (EGD), COMBINED N/A 11/17/2016    Procedure: COMBINED ESOPHAGOSCOPY, GASTROSCOPY, DUODENOSCOPY (EGD);  Surgeon: Santi Rosas MD;  Location:  GI     ESOPHAGOSCOPY, GASTROSCOPY, DUODENOSCOPY (EGD), COMBINED N/A 11/17/2017    Procedure: COMBINED ESOPHAGOSCOPY, GASTROSCOPY, DUODENOSCOPY (EGD);  EGD;  Surgeon: Santi Rosas MD;  Location:  GI     ESOPHAGOSCOPY, GASTROSCOPY, DUODENOSCOPY (EGD), COMBINED N/A 12/28/2018    Procedure: EGD;  Surgeon: Santi Rosas MD;  Location:  OR     HEAD & NECK SURGERY      12/2017 at [facility].      IMPLANT GOLD WEIGHT EYELID Right 11/16/2017    Procedure: IMPLANT WEIGHT EYELID;  Right Upper Eyelid Weight, right tarsal strip  lower eyelid;  Surgeon: Milana Malave MD;  Location: [facility] OR     IR CHEMO EMBOLIZATION  2019     KNEE SURGERY Left      ORTHOPEDIC SURGERY       PAROTIDECTOMY, RADICAL NECK DISSECTION Right 2017    Procedure: PAROTIDECTOMY, RADICAL NECK DISSECTION;  Right Superfacial Parotidectomy , Facial nerve repair. with [facility] facial nerve monitor.;  Surgeon: Asiya Morgan MD;  Location: [facility] OR     PICC INSERTION Left 2017    4fr SL BioFlo PICC, 44cm in the L basilic vein w/ tip in the low SVC     RETURN LIVER TRANSPLANT N/A 2019    Procedure: Exploratory laparotomy, hematoma evacuation, abdominal washout;  Surgeon: Александр Ramos MD;  Location: [facility] OR     TRANSPLANT LIVER RECIPIENT  DONOR N/A 2019    Procedure: TRANSPLANT, LIVER, RECIPIENT,  DONOR;  Surgeon: Александр Ramos MD;  Location: [facility] OR     VASCULAR SURGERY         Current Facility-Administered Medications   Medication     0.9% sodium chloride BOLUS     alpha-lipoic acid capsule 600 mg     aspirin (ASA) EC tablet 325 mg     cyanocobalamin (VITAMIN B-12) tablet 1,000 mcg     dextrose 10 % 1,000 mL infusion     glucose gel 15-30 g    Or     dextrose 50 % injection 25-50 mL    Or     glucagon injection 1 mg     ferrous sulfate (FEROSUL) tablet 325 mg     hypromellose-dextran (ARTIFICAL TEARS) 0.1-0.3 % ophthalmic solution 1 drop     insulin 1 unit/mL in saline (NovoLIN, HumuLIN Regular) drip - ADULT IV Infusion     insulin aspart (NovoLOG) inj (RAPID ACTING)     insulin aspart (NovoLOG) inj (RAPID ACTING)     insulin aspart (NovoLOG) inj (RAPID ACTING)     insulin aspart (NovoLOG) inj (RAPID ACTING)     [START ON 2019] insulin glargine (LANTUS PEN) injection 14 Units     insulin isophane human (HumuLIN N PEN) injection 26 Units     Lidocaine (LIDOCARE) 4 % Patch 1 patch     lidocaine (LMX4) cream     lidocaine 1 % 1 mL     lidocaine patch in PLACE     lidocaine patch REMOVAL     mirtazapine (REMERON) tablet 15 mg      mycophenolic acid (GENERIC EQUIVALENT) EC tablet 540 mg     omeprazole (priLOSEC) CR capsule 40 mg     ondansetron (ZOFRAN-ODT) ODT tab 4 mg     ondansetron (ZOFRAN-ODT) ODT tab 4 mg     rosuvastatin (CRESTOR) tablet 5 mg     simethicone (MYLICON) chewable tablet 80 mg     sodium chloride (PF) 0.9% PF flush 3 mL     sodium chloride (PF) 0.9% PF flush 3 mL     sodium chloride 0.45% infusion     [START ON 12/5/2019] sulfamethoxazole-trimethoprim (BACTRIM/SEPTRA) 400-80 MG per tablet 1 tablet     tacrolimus (GENERIC EQUIVALENT) capsule 2.5 mg     tamsulosin (FLOMAX) capsule 0.4 mg     [START ON 12/5/2019] tenofovir (VIREAD) tablet 300 mg     [START ON 12/5/2019] valGANciclovir (VALCYTE) tablet 450 mg     Vitamin D3 (CHOLECALCIFEROL) 25 mcg (1000 units) tablet 2,000 Units         Psychiatric History:  Seen by psychiatry on 12/3 by Dino Vick CNP, who documents psychiatric history well. Briefly, there has been reference of past bipolar disorder diagnosis with mostly episodes of depression in his adult life treated with multiple trials of psychotropics.     Social History:  Lives alone in [place], not in contact with family. Previously worked as CPA.     Mental Status/Interview:  Appearance/Behavior/Orientation: Alert and oriented to person, place, time, and situation. No evidence of psychomotor agitation.    Cooperation/Reliability: Patient appeared to honestly respond to questions about psychosocial functioning and is deemed a reliable historian.   Cognition/Memory/Judgment:  Not formally assessed, yet no difficulties apparent upon interview. Fund of knowledge consistent with age, level of education, and life experience. Abstract reasoning appropriate, no difficulties with judgment apparent.  Speech/Language: Speech was clear, logical and coherent, of normal rate, rhythm and volume.   Thought Content/Form:  Appropriate to interview and situation. Overall logical and organized.  Mood/Affect: Mood anxious; affect was  mood congruent.      Impression:  Mr. Workman presents with adjustment difficulties that primarily manifest with anxiety about ability to care for himself. He also appears to be isolated and struggling to manage higher level cognitive tasks, which results in anxiety, and then results in more confusion. Also appears to have unrealistic expectations about recovery following transplant, which results in depressed mood and frustration. Appears to be a good candidate for therapy to support adjustment s/p transplant.     Diagnosis:  Adjustment disorder with anxiety and depressed mood  History of bipolar I disorder      Recommendation/Plan:  Provided supportive and CBT interventions including diaphragmatic breathing and cognitive interventions focused on adjusting expectations. Will follow approx 1x per week while in hospital and recommended outpatient follow-up. He agreed and I provided outpatient scheduling info.     Lili Lima, PhD,   Clinical Health Psychologist  Pager: [phone]    *In accordance with the Rules of the [place] [employer], it is noted that psychological descriptions and scientific procedures underlying psychological evaluations have limitations.  Absolute predictions cannot be made based on information in this report.    This note was completed using Dragon voice recognition software.  Although reviewed after completion, some word and grammatical errors may occur.

## 2019-12-05 ENCOUNTER — APPOINTMENT (OUTPATIENT)
Dept: OCCUPATIONAL THERAPY | Facility: CLINIC | Age: 54
DRG: 682 | End: 2019-12-05
Attending: TRANSPLANT SURGERY
Payer: MEDICARE

## 2019-12-05 ENCOUNTER — APPOINTMENT (OUTPATIENT)
Dept: PHYSICAL THERAPY | Facility: CLINIC | Age: 54
DRG: 682 | End: 2019-12-05
Attending: TRANSPLANT SURGERY
Payer: MEDICARE

## 2019-12-05 ENCOUNTER — TELEPHONE (OUTPATIENT)
Dept: INTERNAL MEDICINE | Facility: CLINIC | Age: 54
End: 2019-12-05

## 2019-12-05 PROBLEM — N18.2 CKD (CHRONIC KIDNEY DISEASE) STAGE 2, GFR 60-89 ML/MIN: Status: ACTIVE | Noted: 2019-12-05

## 2019-12-05 LAB
ABO + RH BLD: NORMAL
ABO + RH BLD: NORMAL
ALBUMIN SERPL-MCNC: 2.3 G/DL (ref 3.4–5)
ALP SERPL-CCNC: 164 U/L (ref 40–150)
ALT SERPL W P-5'-P-CCNC: 21 U/L (ref 0–70)
ANION GAP SERPL CALCULATED.3IONS-SCNC: 8 MMOL/L (ref 3–14)
AST SERPL W P-5'-P-CCNC: 15 U/L (ref 0–45)
BILIRUB DIRECT SERPL-MCNC: 0.1 MG/DL (ref 0–0.2)
BILIRUB SERPL-MCNC: 0.6 MG/DL (ref 0.2–1.3)
BLD GP AB SCN SERPL QL: NORMAL
BLD PROD TYP BPU: NORMAL
BLD PROD TYP BPU: NORMAL
BLD UNIT ID BPU: 0
BLOOD BANK CMNT PATIENT-IMP: NORMAL
BLOOD PRODUCT CODE: NORMAL
BPU ID: NORMAL
BUN SERPL-MCNC: 49 MG/DL (ref 7–30)
C PARVUM AG STL QL IA: NORMAL
CALCIUM SERPL-MCNC: 8.5 MG/DL (ref 8.5–10.1)
CHLORIDE SERPL-SCNC: 119 MMOL/L (ref 94–109)
CO2 SERPL-SCNC: 15 MMOL/L (ref 20–32)
CREAT SERPL-MCNC: 2.1 MG/DL (ref 0.66–1.25)
ERYTHROCYTE [DISTWIDTH] IN BLOOD BY AUTOMATED COUNT: 19 % (ref 10–15)
G LAMBLIA AG STL QL IA: NORMAL
GFR SERPL CREATININE-BSD FRML MDRD: 34 ML/MIN/{1.73_M2}
GLUCOSE BLDC GLUCOMTR-MCNC: 111 MG/DL (ref 70–99)
GLUCOSE BLDC GLUCOMTR-MCNC: 114 MG/DL (ref 70–99)
GLUCOSE BLDC GLUCOMTR-MCNC: 145 MG/DL (ref 70–99)
GLUCOSE BLDC GLUCOMTR-MCNC: 153 MG/DL (ref 70–99)
GLUCOSE BLDC GLUCOMTR-MCNC: 178 MG/DL (ref 70–99)
GLUCOSE BLDC GLUCOMTR-MCNC: 219 MG/DL (ref 70–99)
GLUCOSE SERPL-MCNC: 226 MG/DL (ref 70–99)
HCT VFR BLD AUTO: 21.3 % (ref 40–53)
HGB BLD-MCNC: 6.9 G/DL (ref 13.3–17.7)
INR PPP: 1.14 (ref 0.86–1.14)
LACTATE BLD-SCNC: 0.6 MMOL/L (ref 0.7–2)
LIPASE SERPL-CCNC: 70 U/L (ref 73–393)
MAGNESIUM SERPL-MCNC: 1.6 MG/DL (ref 1.6–2.3)
MCH RBC QN AUTO: 30.9 PG (ref 26.5–33)
MCHC RBC AUTO-ENTMCNC: 32.4 G/DL (ref 31.5–36.5)
MCV RBC AUTO: 96 FL (ref 78–100)
NUM BPU REQUESTED: 2
PLATELET # BLD AUTO: 117 10E9/L (ref 150–450)
POTASSIUM SERPL-SCNC: 4.7 MMOL/L (ref 3.4–5.3)
PROT SERPL-MCNC: 5.9 G/DL (ref 6.8–8.8)
RBC # BLD AUTO: 2.23 10E12/L (ref 4.4–5.9)
SODIUM SERPL-SCNC: 143 MMOL/L (ref 133–144)
SPECIMEN EXP DATE BLD: NORMAL
SPECIMEN SOURCE: NORMAL
TACROLIMUS BLD-MCNC: 6 UG/L (ref 5–15)
TME LAST DOSE: NORMAL H
TRANSFUSION STATUS PATIENT QL: NORMAL
TRANSFUSION STATUS PATIENT QL: NORMAL
WBC # BLD AUTO: 3.5 10E9/L (ref 4–11)

## 2019-12-05 PROCEDURE — 87209 SMEAR COMPLEX STAIN: CPT | Performed by: NURSE PRACTITIONER

## 2019-12-05 PROCEDURE — 80048 BASIC METABOLIC PNL TOTAL CA: CPT | Performed by: PHYSICIAN ASSISTANT

## 2019-12-05 PROCEDURE — P9016 RBC LEUKOCYTES REDUCED: HCPCS | Performed by: NURSE PRACTITIONER

## 2019-12-05 PROCEDURE — 83690 ASSAY OF LIPASE: CPT | Performed by: PHYSICIAN ASSISTANT

## 2019-12-05 PROCEDURE — 36415 COLL VENOUS BLD VENIPUNCTURE: CPT | Performed by: NURSE PRACTITIONER

## 2019-12-05 PROCEDURE — 25000128 H RX IP 250 OP 636: Performed by: PHYSICIAN ASSISTANT

## 2019-12-05 PROCEDURE — 97530 THERAPEUTIC ACTIVITIES: CPT | Mod: GO

## 2019-12-05 PROCEDURE — 25000131 ZZH RX MED GY IP 250 OP 636 PS 637: Mod: GY | Performed by: NURSE PRACTITIONER

## 2019-12-05 PROCEDURE — 25000132 ZZH RX MED GY IP 250 OP 250 PS 637: Mod: GY | Performed by: NURSE PRACTITIONER

## 2019-12-05 PROCEDURE — 85610 PROTHROMBIN TIME: CPT | Performed by: NURSE PRACTITIONER

## 2019-12-05 PROCEDURE — 25000132 ZZH RX MED GY IP 250 OP 250 PS 637: Mod: GY | Performed by: PHYSICIAN ASSISTANT

## 2019-12-05 PROCEDURE — 85027 COMPLETE CBC AUTOMATED: CPT | Performed by: PHYSICIAN ASSISTANT

## 2019-12-05 PROCEDURE — 36415 COLL VENOUS BLD VENIPUNCTURE: CPT | Performed by: PHYSICIAN ASSISTANT

## 2019-12-05 PROCEDURE — 83605 ASSAY OF LACTIC ACID: CPT

## 2019-12-05 PROCEDURE — 25000132 ZZH RX MED GY IP 250 OP 250 PS 637: Mod: GY | Performed by: TRANSPLANT SURGERY

## 2019-12-05 PROCEDURE — 25000131 ZZH RX MED GY IP 250 OP 636 PS 637: Mod: GY | Performed by: PHYSICIAN ASSISTANT

## 2019-12-05 PROCEDURE — 87177 OVA AND PARASITES SMEARS: CPT | Performed by: NURSE PRACTITIONER

## 2019-12-05 PROCEDURE — 80197 ASSAY OF TACROLIMUS: CPT | Performed by: NURSE PRACTITIONER

## 2019-12-05 PROCEDURE — 87328 CRYPTOSPORIDIUM AG IA: CPT | Performed by: INTERNAL MEDICINE

## 2019-12-05 PROCEDURE — 97116 GAIT TRAINING THERAPY: CPT | Mod: GP

## 2019-12-05 PROCEDURE — 87207 SMEAR SPECIAL STAIN: CPT | Performed by: PHYSICIAN ASSISTANT

## 2019-12-05 PROCEDURE — 87329 GIARDIA AG IA: CPT | Performed by: INTERNAL MEDICINE

## 2019-12-05 PROCEDURE — 25000132 ZZH RX MED GY IP 250 OP 250 PS 637: Mod: GY | Performed by: STUDENT IN AN ORGANIZED HEALTH CARE EDUCATION/TRAINING PROGRAM

## 2019-12-05 PROCEDURE — 80076 HEPATIC FUNCTION PANEL: CPT | Performed by: PHYSICIAN ASSISTANT

## 2019-12-05 PROCEDURE — 12000026 ZZH R&B TRANSPLANT

## 2019-12-05 PROCEDURE — 97530 THERAPEUTIC ACTIVITIES: CPT | Mod: GP

## 2019-12-05 PROCEDURE — 00000146 ZZHCL STATISTIC GLUCOSE BY METER IP

## 2019-12-05 PROCEDURE — 83735 ASSAY OF MAGNESIUM: CPT | Performed by: PHYSICIAN ASSISTANT

## 2019-12-05 PROCEDURE — 27210432 ZZH NUTRITION PRODUCT RENAL BASIC LITER

## 2019-12-05 RX ORDER — PROCHLORPERAZINE MALEATE 5 MG
5 TABLET ORAL EVERY 6 HOURS PRN
Status: DISCONTINUED | OUTPATIENT
Start: 2019-12-05 | End: 2019-12-10 | Stop reason: HOSPADM

## 2019-12-05 RX ORDER — SULFAMETHOXAZOLE AND TRIMETHOPRIM 400; 80 MG/1; MG/1
1 TABLET ORAL DAILY
Status: DISCONTINUED | OUTPATIENT
Start: 2019-12-06 | End: 2019-12-10 | Stop reason: HOSPADM

## 2019-12-05 RX ORDER — ONDANSETRON 4 MG/1
4 TABLET, FILM COATED ORAL EVERY 6 HOURS PRN
Status: DISCONTINUED | OUTPATIENT
Start: 2019-12-05 | End: 2019-12-10 | Stop reason: HOSPADM

## 2019-12-05 RX ORDER — PRENATAL VIT/IRON FUM/FOLIC AC 27MG-0.8MG
1 TABLET ORAL DAILY
Status: DISCONTINUED | OUTPATIENT
Start: 2019-12-05 | End: 2019-12-10 | Stop reason: HOSPADM

## 2019-12-05 RX ORDER — VALGANCICLOVIR 450 MG/1
450 TABLET, FILM COATED ORAL DAILY
Status: DISCONTINUED | OUTPATIENT
Start: 2019-12-06 | End: 2019-12-10 | Stop reason: HOSPADM

## 2019-12-05 RX ORDER — CARVEDILOL 12.5 MG/1
12.5 TABLET ORAL 2 TIMES DAILY WITH MEALS
Status: DISCONTINUED | OUTPATIENT
Start: 2019-12-05 | End: 2019-12-10 | Stop reason: HOSPADM

## 2019-12-05 RX ORDER — SODIUM BICARBONATE 650 MG/1
650 TABLET ORAL 2 TIMES DAILY
Status: DISCONTINUED | OUTPATIENT
Start: 2019-12-05 | End: 2019-12-10 | Stop reason: HOSPADM

## 2019-12-05 RX ADMIN — SULFAMETHOXAZOLE AND TRIMETHOPRIM 1 TABLET: 400; 80 TABLET ORAL at 07:46

## 2019-12-05 RX ADMIN — MIRTAZAPINE 15 MG: 15 TABLET, FILM COATED ORAL at 22:26

## 2019-12-05 RX ADMIN — TENOFOVIR DISOPROXIL FUMARATE 300 MG: 300 TABLET ORAL at 07:46

## 2019-12-05 RX ADMIN — ONDANSETRON 4 MG: 4 TABLET, ORALLY DISINTEGRATING ORAL at 20:14

## 2019-12-05 RX ADMIN — TACROLIMUS 2.5 MG: 1 CAPSULE ORAL at 07:45

## 2019-12-05 RX ADMIN — VALGANCICLOVIR 450 MG: 450 TABLET, FILM COATED ORAL at 07:46

## 2019-12-05 RX ADMIN — INSULIN ASPART 2 UNITS: 100 INJECTION, SOLUTION INTRAVENOUS; SUBCUTANEOUS at 08:19

## 2019-12-05 RX ADMIN — SODIUM BICARBONATE 650 MG TABLET 650 MG: at 20:14

## 2019-12-05 RX ADMIN — Medication 600 MG: at 18:00

## 2019-12-05 RX ADMIN — FERROUS SULFATE TAB 325 MG (65 MG ELEMENTAL FE) 325 MG: 325 (65 FE) TAB at 07:46

## 2019-12-05 RX ADMIN — ASPIRIN 325 MG: 325 TABLET, DELAYED RELEASE ORAL at 07:46

## 2019-12-05 RX ADMIN — OMEPRAZOLE 40 MG: 20 CAPSULE, DELAYED RELEASE ORAL at 07:46

## 2019-12-05 RX ADMIN — ONDANSETRON 4 MG: 4 TABLET, ORALLY DISINTEGRATING ORAL at 07:47

## 2019-12-05 RX ADMIN — ROSUVASTATIN CALCIUM 5 MG: 5 TABLET, FILM COATED ORAL at 20:16

## 2019-12-05 RX ADMIN — LIDOCAINE 1 PATCH: 560 PATCH PERCUTANEOUS; TOPICAL; TRANSDERMAL at 07:52

## 2019-12-05 RX ADMIN — POLYETHYLENE GLYCOL 3350, SODIUM SULFATE ANHYDROUS, SODIUM BICARBONATE, SODIUM CHLORIDE, POTASSIUM CHLORIDE 4000 ML: 236; 22.74; 6.74; 5.86; 2.97 POWDER, FOR SOLUTION ORAL at 18:00

## 2019-12-05 RX ADMIN — TACROLIMUS 2.5 MG: 1 CAPSULE ORAL at 18:00

## 2019-12-05 RX ADMIN — CYANOCOBALAMIN TAB 1000 MCG 1000 MCG: 1000 TAB at 18:01

## 2019-12-05 RX ADMIN — MYCOPHENOLIC ACID 540 MG: 360 TABLET, DELAYED RELEASE ORAL at 20:14

## 2019-12-05 RX ADMIN — MYCOPHENOLIC ACID 540 MG: 360 TABLET, DELAYED RELEASE ORAL at 07:45

## 2019-12-05 RX ADMIN — CARVEDILOL 12.5 MG: 12.5 TABLET, FILM COATED ORAL at 18:00

## 2019-12-05 RX ADMIN — TAMSULOSIN HYDROCHLORIDE 0.4 MG: 0.4 CAPSULE ORAL at 20:14

## 2019-12-05 RX ADMIN — INSULIN ASPART 1 UNITS: 100 INJECTION, SOLUTION INTRAVENOUS; SUBCUTANEOUS at 14:04

## 2019-12-05 RX ADMIN — Medication 25 MG: at 00:20

## 2019-12-05 RX ADMIN — INSULIN GLARGINE 14 UNITS: 100 INJECTION, SOLUTION SUBCUTANEOUS at 07:53

## 2019-12-05 RX ADMIN — PRENATAL VIT W/ FE FUMARATE-FA TAB 27-0.8 MG 1 TABLET: 27-0.8 TAB at 18:00

## 2019-12-05 RX ADMIN — MELATONIN 2000 UNITS: at 18:01

## 2019-12-05 ASSESSMENT — ACTIVITIES OF DAILY LIVING (ADL)
ADLS_ACUITY_SCORE: 16
ADLS_ACUITY_SCORE: 17
ADLS_ACUITY_SCORE: 16
ADLS_ACUITY_SCORE: 17

## 2019-12-05 ASSESSMENT — PAIN DESCRIPTION - DESCRIPTORS: DESCRIPTORS: SHARP

## 2019-12-05 NOTE — TELEPHONE ENCOUNTER
Pt called and after updating his chart was informed he is inpatient at the [facility].  Will follow up as needed.  Dennise Augustin RN 2:47 PM on 12/5/2019.

## 2019-12-05 NOTE — PLAN OF CARE
"Discharge Planner PT   Patient plan for discharge: Rehab  Current status: Focus on IND with mobility within precautions.  Pt IND with bed mob with log roll and IND with sit<>stand transfers.  Pt ambulated 100' with 1 UE support on IV pole followed by 700\" no UE support.  Continue to encourage ambulation and sitting up in chair.   Barriers to return to prior living situation: Need for increased assist with ambulation and stairs, balance  Recommendations for discharge: TCU vs Home with assist  Rationale for recommendations: Pending progress and assist at home       Entered by: Cyndi Miller 12/05/2019 4:36 PM       "

## 2019-12-05 NOTE — PROGRESS NOTES
IP Diabetes Management  Daily Note           Assessment and Plan:   HPI: Mr. Miller Workman is a 53 yo man with a history of type 2 diabetes complicated by peripheral neuropathy and retinopathy, cirrhosis secondary to ESTRADA, HCC, HTN, HLD, GERD, BPH, who is s/p DBD liver transplant on 11/11/19, who was readmitted 12/2/19 with acute renal failure, confusion, and elevated tacrolimus level.     Assessment:   1)Type II Diabetes Mellitus  2)Enteral feed hyperglycemia     Transitioning to cycled tube feeds beginning tonight, will run 2714-6020.    Plan:    -Lantus 14 units daily AM   -NPH to be given at start of tube feed cycle (1800): tentatively 15 units (1 per 8g CHO ratio)   -continue aspart 1:10g CHO coverage with meals and snacks/supplements   -aspart moderate intensity sliding scale AC during the day, and at 2200/0200 overnight (cycled enteral feeds)   -PRN D10% to run at nutritional rate if tube feeds are interrupted, with NPH on board   -BG monitoring AC, HS, 0200   -hypoglycemia protocol   -mod CHO diet with carb counting protocol   -please alert IP Diabetes team with plans for NPO status, procedures    -diabetes education completed by [facility] upon this admission; may require follow up CDE visit when insulin regimen is known prior to discharge home.     Outpatient follow up: with [facility]  Plan discussed with patient.     Interval History and Assessment: interval glucose trend reviewed:  BG remaining in good control until this morning, now trending to 200's  Possibly related to higher NPH need with tube feeds running overnight at goal rate. Denied snacking overnight   PO intake remains poor, reports he vomited with attempt to eat this AM  Required PRBC for anemia. He reports GI to be consulted for ? GIB.   He endorses confusion with current insulin plan and frequent changes. Assured him that his plan for discharge will be simpler, will be discussed with him in detail, and will be different that current  "plan, and that he is not required to memorize his insulin plan at this time.     Current nutritional intake and type: Orders Placed This Encounter      2 Gram K Diet    PTA Diabetes Regimen:   Christian Mistry Expert for glucose monitoring and calculating aspart doses-(settings input into meter for calculation, he only has to enter his BG and CHO intake). Settings: \"meal rise\"- 50mg/dL. Snack size-10g, active insulin time 3 hrs.     Insulin regimen: (most recently, though with multiple dose changes since hospital discharge due to hypoglycemia)  Tresiba 15 units daily AM   aspart 1unit/10 g CHO  aspart 1unit/40 for BG >140     Held since transplant:   Metformin ER 500mg with supper  Farxiga 10mg daily    Discharge Planning: unknown, anticipating discharge to TCU.         Diabetes History:   Type of Diabetes: Type 2 Diabetes Mellitus, TPN/Enteral Feeding Induced Hyperglycemia  Lab Results   Component Value Date    A1C 6.6 08/08/2018    A1C 6.5 06/09/2017    A1C 7.8 10/25/2016              Review of Systems:   The Review of Systems is negative other than noted in the Interval History.           Medications:     Current Facility-Administered Medications   Medication     0.9% sodium chloride BOLUS     alpha-lipoic acid capsule 600 mg     aspirin (ASA) EC tablet 325 mg     cyanocobalamin (VITAMIN B-12) tablet 1,000 mcg     dextrose 10 % 1,000 mL infusion     dextrose 10% infusion     glucose gel 15-30 g    Or     dextrose 50 % injection 25-50 mL    Or     glucagon injection 1 mg     ferrous sulfate (FEROSUL) tablet 325 mg     hypromellose-dextran (ARTIFICAL TEARS) 0.1-0.3 % ophthalmic solution 1 drop     insulin 1 unit/mL in saline (NovoLIN, HumuLIN Regular) drip - ADULT IV Infusion     insulin aspart (NovoLOG) inj (RAPID ACTING)     insulin aspart (NovoLOG) inj (RAPID ACTING)     insulin aspart (NovoLOG) inj (RAPID ACTING)     insulin aspart (NovoLOG) inj (RAPID ACTING)     insulin glargine (LANTUS PEN) injection 14 Units "     insulin isophane human (HumuLIN N PEN) injection 12 Units     Lidocaine (LIDOCARE) 4 % Patch 1 patch     lidocaine (LMX4) cream     lidocaine 1 % 1 mL     lidocaine patch in PLACE     lidocaine patch REMOVAL     mirtazapine (REMERON) tablet 15 mg     mycophenolic acid (GENERIC EQUIVALENT) EC tablet 540 mg     naloxone (NARCAN) injection 0.1-0.4 mg     omeprazole (priLOSEC) CR capsule 40 mg     ondansetron (ZOFRAN-ODT) ODT tab 4 mg     ondansetron (ZOFRAN-ODT) ODT tab 4 mg     rosuvastatin (CRESTOR) tablet 5 mg     simethicone (MYLICON) chewable tablet 80 mg     sodium chloride (PF) 0.9% PF flush 3 mL     sodium chloride (PF) 0.9% PF flush 3 mL     sodium chloride 0.45% infusion     sulfamethoxazole-trimethoprim (BACTRIM/SEPTRA) 400-80 MG per tablet 1 tablet     tacrolimus (GENERIC EQUIVALENT) capsule 2.5 mg     tamsulosin (FLOMAX) capsule 0.4 mg     tenofovir (VIREAD) tablet 300 mg     traMADol (ULTRAM) half-tab 25 mg     valGANciclovir (VALCYTE) tablet 450 mg     Vitamin D3 (CHOLECALCIFEROL) 25 mcg (1000 units) tablet 2,000 Units            Physical Exam:    /69   Pulse 91   Temp 98.4  F (36.9  C) (Oral)   Resp 20   Wt 75.8 kg (167 lb)   SpO2 98%   BMI 24.66 kg/m    General: pleasant, in no distress. Sleeping comfortably   HEENT: normocephalic, atraumatic. Oral mucous membranes moist. NGT bridled, in place.   Lungs: unlabored respiration, no cough  ABD: rounded, soft, no lipodystrophy noted  Skin: warm and dry, no obvious lesions  MSK:  moves all extremities  Lymp:  no LE edema   Mental status: sleeping on visit attempts x2  Psych: sleeping on visit attempts x2            Data:     Recent Labs   Lab 12/05/19  0818 12/05/19  0605 12/05/19  0414 12/05/19  0200 12/04/19  2153 12/04/19  1742 12/04/19  1404  12/04/19  0446  12/03/19  0536  12/02/19  1034   GLC  --  226*  --   --   --   --   --   --  181*  --  173*  --  194*   *  --  178* 153* 159* 171* 168*   < >  --    < >  --    < >  --     < >  = values in this interval not displayed.     Lab Results   Component Value Date    WBC 3.5 (L) 12/05/2019    WBC 3.4 (L) 12/04/2019    WBC 4.4 12/03/2019    HGB 6.9 (LL) 12/05/2019    HGB 6.5 (LL) 12/04/2019    HGB 6.0 (LL) 12/04/2019    HCT 21.3 (L) 12/05/2019    HCT 19.1 (L) 12/04/2019    HCT 20.4 (L) 12/03/2019    MCV 96 12/05/2019     12/04/2019    MCV 98 12/03/2019     (L) 12/05/2019     (L) 12/04/2019     (L) 12/03/2019     Lab Results   Component Value Date     12/05/2019     12/04/2019     (H) 12/04/2019    POTASSIUM 4.7 12/05/2019    POTASSIUM 4.8 12/04/2019    POTASSIUM 4.9 12/03/2019    CHLORIDE 119 (H) 12/05/2019    CHLORIDE 124 (H) 12/04/2019    CHLORIDE 117 (H) 12/03/2019    CO2 15 (L) 12/05/2019    CO2 15 (L) 12/04/2019    CO2 12 (L) 12/03/2019     (H) 12/05/2019     (H) 12/04/2019     (H) 12/03/2019     Lab Results   Component Value Date    BUN 49 (H) 12/05/2019    BUN 61 (H) 12/04/2019    BUN 75 (H) 12/03/2019     Lab Results   Component Value Date    TSH 0.49 08/08/2018    TSH 0.71 02/08/2018    TSH 0.42 06/09/2017     Lab Results   Component Value Date    AST 15 12/05/2019    AST 14 12/04/2019    AST 13 12/03/2019    ALT 21 12/05/2019    ALT 18 12/04/2019    ALT 21 12/03/2019    ALKPHOS 164 (H) 12/05/2019    ALKPHOS 160 (H) 12/04/2019    ALKPHOS 148 12/03/2019       Diabetes Management Team job code: [id number]  I spent a total of 25 minutes bedside and on the inpatient unit managing glycemic care. Over 50% of my time on the unit was spent counseling the patient and/or coordinating care regarding acute hyperglycemia management.  See note for details.    Lanie Atkinson PA-C  Inpatient Diabetes Management Service  Pager [phone]

## 2019-12-05 NOTE — PROGRESS NOTES
Calorie Count  Intake recorded for: 12/4  Total Kcals: 196 Total Protein: 15g  Kcals from Hospital Food: 196  Protein: 15g  Kcals from Outside Food (average):0 Protein: 0g  # Meals Recorded: 100% 2 diet lemonades, 50% omelet w/ cheese, 25% chicken noodle soup  # Supplements Recorded: 0

## 2019-12-05 NOTE — PLAN OF CARE
/68 (BP Location: Right arm)   Pulse 91   Temp 98.8  F (37.1  C) (Oral)   Resp 16   Wt 75.8 kg (167 lb)   SpO2 97%   BMI 24.66 kg/m      Pt. tachy 102, AOVSS on RA. B, 178-1unit per sliding scale insulin. Pt. c/o increased RLQ pain radiating to right flank unrelieved with heating pad & repositioning. On-call, Dr. Figueroa, notified & ordered prn Tramadol & administered x1 with relief. Pt. up with SBA & cane. Voided 700cc, no stools this shift. Tube feeds at 60cc/hour (goal rate) into NJ & tolerating well. 2gm K+ diet with calorie counts. Right PIV with 1/2 NS at 75cc/hour.  Pt. resting well between cares. Call light in reach. Continue to follow POC & notify team with change in status.      Addendum:    Pt's Hgb: 6.9 this morning & SUSHANT Donahue, notified & ordered 1unit PRBCs.     Pt. triggered sepsis & Lactic acid 0.6.

## 2019-12-05 NOTE — PLAN OF CARE
OT/7A - Discharge Planner OT   Patient plan for discharge: open to rehab  Current status: Facilitated simulated medication management/set up activity.  Pt able to correctly and independently set up 6/6 simulated medications. Pt reports having difficulty with medication management at home due to having many (22) medications.   Barriers to return to prior living situation: limited support, cognition, post surgical precautions  Recommendations for discharge: TCU  Rationale for recommendations: to increase ADL/IADL IND and safety prior to return home       Entered by: Josefa Acuna 12/05/2019 10:12 AM

## 2019-12-05 NOTE — CONSULTS
Diabetes Education  Patient seen by [facility] RN on 12/3/19.  Will sign off.  Sera Steiner MS, APRN, CNS, CDE, CDTC  [phone]

## 2019-12-05 NOTE — PROGRESS NOTES
Transplant Surgery  Inpatient Daily Progress Note  12/05/2019    Assessment & Plan: Frandy Workman is a 54 year old male with PMHx significant for cirrhosis secondary to ESTRADA diagnosed in 2013, HCC 2018, HTN, HLD, GERD, BPH and DMII. S/p liver transplant 11/11/19 complicated by hematoma evacuation on POD 1. Now admitted with JERONIMO, nausea, diarrhea, confusion and weakness.     Graft function:   S/p liver transplant: DD OLT 11/11/19. LFTs stable. 12/4 US liver d/t RUQ tenderness revealed multiple small fluid collections around liver; patent vasculature.   Immunosuppression management:   CC: Myfortic 540 mg BID, changed from Cellcept on 12/2 due to nausea.  FK: Tacrolimus 2.5 mg BID; goal 10-12 (12 hr trough). Level today 6 (~12hr trough); continue current dose d/t recent increase.  Complexity of management:Medium. Contributing factors: organ dysfunction and confusion  Hematology:   Anemia: Hgb 6 yesterday, transfused 1unit PRBC. Hgb today 6.9; transfuse 1unit PRBC. Hemoccult stool positive. % Retic 1.5. INR 1.14. LDH and haptoglobin ordered. Recheck hgb tomorrow.   Cardiorespiratory:   Hypotensive: PTA Coreg discontinued on admission; will restart. Continue IVF. Resolved.   GI/Nutrition:   Severe malnutrition in the context of acute on chronic illness: Minimal intake outpatient due to confusion, no appetite. NJ placed for TF initiation. RD consulted. Supplements and flynn counts ordered. 12/4 flynn count 196. TF now cycled.  Endocrine:   Type II diabetes mellitus: PTA was on Tresiba, carb coverage and sliding scale insulin. Hypoglycemia at home due to poor PO intake. Sliding scale insulin and carb coverage restarted. Hold Tresiba at this time. Endocrinology consulted. -219.   Fluid/Electrolytes:   JERONIMO: Likely r/t decreased PO intake outpatient. Cr 3.2 on admission from 1.9; recheck today 2.1.   Hyperkalemia: D/t tacro, acidosis, JERONIMO. K 6 on 12/3; 30G kayexalate administered. Down to 4.7 today. Continue low K  diet.  Hyperchloremia: Cl 117->124->119. Stop NS and start 1/2 NS @ 75. Add free water to TF. Oral fluid intake is good.   Hypernatremia: Na 138->146->143. Continue free water.  Low bicarb: Due to JERONIMO and diarrhea. CO2 12->15->15. Will initiate bicarb BID.  : No acute issues  Infectious disease: Tmax yesterday 99.5F; WBC 3.4. UA unremarkable. Today afebrile. CMV 12/2 not detected.   Diarrhea: Worsening diarrhea prior to admission. C. Diff, enteric viral panel, and rotavirus negative. Fecal lactoferrin positive. Diarrhea improving.  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   Neuro:   Confusion: Patient intermittently confused at home. MOCA indicating cognitive impairment. No confusion today.  Anxiety: Health psychology and psych consulted. Remeron initiated at bedtime per psych recommendations. Health psychology to see patient weekly while inpatient.  Prophylaxis: DVT (mechanical), fall, GI (protonix), viral (Valcyte), pneumocystis (Bactrim)  Disposition: 7A; PT/OT evaluating. Plan for discharge to TCU in 1-2 days.    Medical Decision Making: Medium  Subsequent visit 39383 (moderate level decision making)    SERA/Fellow/Resident Provider: Wendy Donahue NP    Faculty: Berlin Hernandez M.D.    __________________________________________________________________  Transplant History:    11/11/2019 (Liver), Postoperative day: 24     Interval History: History is obtained from the patient  No acute events overnight. Patient seen lying comfortably in bed. He continues to c/o RUQ pain relieved with heating pad. He is nauseous and vomited x1 this morning. He has no other complaints. He is motivated to work with PT to improve strength.    ROS:   A 10-point review of systems was negative except as noted above.    Curent Meds:    alpha-lipoic acid  600 mg Oral Daily     aspirin  325 mg Oral Daily     cyanocobalamin  1,000 mcg Oral Daily     ferrous sulfate  325 mg  Oral TID w/meals     insulin aspart  1-5 Units Subcutaneous BID     insulin aspart  1-7 Units Subcutaneous TID AC     insulin aspart   Subcutaneous TID w/meals     insulin glargine  14 Units Subcutaneous QAM     insulin isophane human  15 Units Subcutaneous QPM     lidocaine   Transdermal Q8H     mirtazapine  15 mg Oral At Bedtime     mycophenolic acid  540 mg Oral Q12H     omeprazole  40 mg Oral Daily     ondansetron  4 mg Oral TID     prenatal multivitamin w/iron  1 tablet Oral Daily     rosuvastatin  5 mg Oral Daily     sodium chloride (PF)  3 mL Intracatheter Q8H     [START ON 12/6/2019] sulfamethoxazole-trimethoprim  1 tablet Oral Daily     tacrolimus  2.5 mg Oral BID     tamsulosin  0.4 mg Oral Daily     tenofovir  300 mg Oral Q48H     [START ON 12/6/2019] valGANciclovir  450 mg Oral Daily     vitamin D3  2,000 Units Oral Daily       Physical Exam:     Admit Weight: 73.8 kg (162 lb 11.2 oz)    Current Vitals:   /72   Pulse 91   Temp 98.6  F (37  C) (Oral)   Resp 24   Wt 75.8 kg (167 lb)   SpO2 98%   BMI 24.66 kg/m      Vital sign ranges:    Temp:  [97.8  F (36.6  C)-98.8  F (37.1  C)] 98.6  F (37  C)  Heart Rate:  [] 92  Resp:  [16-24] 24  BP: (130-160)/(68-79) 137/72  SpO2:  [97 %-100 %] 98 %    General Appearance: in no apparent distress.   Skin: normal, warm, dry, No rashes, induration, or jaundice. Pallor noted.  Heart: well-perfused  Lungs: non-labored breathing on RA  Abdomen: Incision stapled and open to air. Abdomen flat. Mildly tender RUQ.  : Carroll is not present.  Extremities: edema: none  Neurologic: alert, oriented x4. Tremor absent.     Data:   CMP  Recent Labs   Lab 12/05/19  0605 12/04/19  1342 12/04/19  0446  12/03/19  0536    144 146*  --  138   POTASSIUM 4.7  --  4.8   < > 6.0*   CHLORIDE 119*  --  124*  --  117*   CO2 15*  --  15*  --  12*   *  --  181*  --  173*   BUN 49*  --  61*  --  75*   CR 2.10*  --  2.46*  --  2.94*   GFRESTIMATED 34*  --  28*  --  23*    GFRESTBLACK 40*  --  33*  --  26*   DEBORAH 8.5  --  8.4*  --  8.9   MAG 1.6  --  1.9  --  2.1   PHOS  --   --  3.6  --  4.1   LIPASE 70*  --   --   --   --    ALBUMIN 2.3*  --  2.4*  --  2.4*   BILITOTAL 0.6  --  0.4  --  0.3   ALKPHOS 164*  --  160*  --  148   AST 15  --  14  --  13   ALT 21  --  18  --  21    < > = values in this interval not displayed.     CBC  Recent Labs   Lab 12/05/19  0605 12/04/19  0625 12/04/19  0446   HGB 6.9* 6.5* 6.0*   WBC 3.5*  --  3.4*   *  --  132*     Attestation:    The patient has been seen and evaluated by me.   Vital signs, labs, medications and orders were reviewed.   When obtained, diagnostic images were reviewed by me and interpreted as above.    The care plan was discussed with the multidisciplinary team and I agree with the findings and plan in this note, with any differences recorded in blue.    Immunosuppressive medication management was reviewed and adjusted as reflected in the note and orders.     .

## 2019-12-05 NOTE — PLAN OF CARE
/72   Pulse 91   Temp 98.6  F (37  C) (Oral)   Resp 24   Wt 75.8 kg (167 lb)   SpO2 98%   BMI 24.66 kg/m      Patient VSS on RA, afebrile.  & 143, managed with sliding scale. R low back/flank pain managed with lidocaine patch. Tolerating 2G K diet with no appetite. Pt had one chocolate boost. TF cycled from 7641-6077, NJ clamped. PIV in left arm infusing 1/2 NS @ 75 ml/hr. R PIV infiltrated, removed.  2x loose BM's today, ova and parasite stool sample needed. Voiding adequate amounts. Liver incision approximated and steri-strips present. Up SBA with cane. Pt down to liver support group today. Educated for low hgb (6.9) 1 unit PRBCs today. GI consulting, plan to encourage activity and food and monitor blood glucose. Will continue with POC and notify MD with changes or concerns.

## 2019-12-05 NOTE — CONSULTS
"Gastroenterology Consultation  Frandy Workman [id number] 54 year old 1965 12/5/2019        Date of Admission: 12/2/2019  Reason for consult: \"Nausea, emesis, diarrhea, Hgb drop, and positive occult blood\"  Requesting physician: Dr. Hernandez, Berlin Cartagena MD       Reason for Consultation:   \"Nausea, emesis, diarrhea, Hgb drop, and positive occult blood\"         Assessment and Plan:   Frandy Workman is a 54 year old male with a PMHx of ESTRADA cirrhosis s/p OLT (11/11/2019) c/b hematoma evacuation (CMV + donor, CMV - recipient, donor had HBV+ thus on tenofovir),  HCC (s/p TACE and microwave ablation 01/2019 prior to OLT), type 2 diabetes, CAD, and GERD, BPH, who presented with acute renal failure, confusion, and elevated tacrolimus level.     #. Nausea: Denies nausea prior to admission, started this AM. The patient's nausea is likely multifactorial.  Patient with elevated tacrolimus levels and elevated BUN on admission; uremia could be contributing to this patient's nausea. Patient with a fluid collection surrounding the liver transplant w/increase in the total amount of fluid, compared to prior.   - Plan for EGD on 12/6/2019 AM  - Recommend further workup of fluid collection per transplant team, given concern that this collection may be contributing to the patient's RUQ and narcotic use.   - Recommend switching from zofran to either reglan or compazine IV given patient reports no change in sx w/zofran  - Electrolyte management per primary team  - Management of uremia, JERONIMO and tacrolimus dosing per transplant team  - Agree with daily calorie counts per nutrition    #. Loose stools, non-bloody, afebrile: Patient with 1-2 loose stools prior to admission. Common causes of post transplant loose stools include infectious etiologies - although stool Enteric pathogen panel: pan negative. C. Diff: Negative. Adenovirus: Negative. CMV DNA: Not detected.  The patient is on several new medications - common medications " associated with post-transplant diarrhea including cellcept, tacrolimus, tenofovir, PPIs and degludec.   - Recommend microsporidia, giardia, cryptosporidium and O&P x 3  - Plan for colonoscopy on 12/6/2019 AM   * NPO at [place]   * GoLytely prep starting today, ending before 7am.   - Recommend reducing all un-necessary medications, including PPI  - Continue supportive care, including fluid replacement, per primary team  - Please monitor stool output closely - document color, form and amount per shift. Order for strict I&Os placed for you.      #. Anemia, normocytic:   Patient with Hgb of 9.6 on discharge 11/27, now with Hgb 7.6 on admission. MCV 97 on admission. Patient on bactrim, which can contribute to macrocytosis. Based on retic index, patient is hypoproliferative in the recent setting of liver transplant and immunosuppression. Has required 1u pRBC on 12/4 and 1u PRBC on 12/5. Rectal exam without melena or hematochezia. Per patient, no other signs of overt bleeding.   - Consider further work up of anemia, including peripheral smear and hemolysis, such as LDH.     FOBT in acute bleeding  Despite the fact that FOBT testing was developed for the purpose of colorectal cancer screening, over the past several decades physicians have used FOBT cards in the workup of altered stool color (e.g. melena or hematochezia). This practice, however, is not supported by current guidelines and, perhaps most importantly, is not cost-effective. False positive results using FOBT can be due to (epistaxis, swallowed hemoptysis), mucosal inflammation without bleeding (inflammatory bowel disease), certain foods (vegetables containing peroxidase, and meats), toxins (such as alcohol), or clinically insignificant bleeding caused by anti-inflammatory drugs. False negative results using FOBT can be due to slow or intermittent bleeding, ingestion of antioxidants such as vitamin C, or upper gastrointestinal bleeding in which globin is  denatured.    Please remember, that as of 2017 each FOBT card costs the hospital money and is of no diagnostic value in the setting of acute gastrointestinal bleeding.  References:  - Wilfrido et al. Eliminating In-Hospital Fecal Occult Blood Testing: Our experience in disinvestment. The American Journal of Medicine 2018;131:760-763.  - Zbigniew et al. Fecal occult blood testing in hospitalized patients with upper gastrointestinal bleeding. J Hosp Med 2017;12(7):567-569.  - Cyndi et al. Fecal occult blood testing as a diagnostic test in symptomatic patients is not useful: a retrospective chart review.?[place] Journal of Gastroenterology and Hepatology 2014;28:421-426.    It has been a pleasure to participate in the care of this patient.  Patient discussed with GI staff, Dr. Cisneros.  Please do not hesitate to contact the GI service with any questions or concerns.     Cecilia Amaya (Lizzie)  Gastroenterology Fellow  Pager [phone]         HPI:   Frandy Workman is a 54 year old male with a PMHx of ESTRADA cirrhosis s/p OLT (11/11/2019) c/b hematoma evacuation (CMV + donor, CMV - recipient, donor had HBV+ thus on tenofovir),  HCC (s/p TACE and microwave ablation 01/2019 prior to OLT), type 2 diabetes, CAD, and GERD, BPH, who presented with acute renal failure, confusion, and elevated tacrolimus level.     Patient denies any nausea or emesis prior to admission. On the day after admission an NJ tube was placed and since starting TF he has reported nausea with tube feeding. This AM he tried to eat a bite of food and had a small amount of emesis, denies hematemesis.     Since transplant he has been having 1-2 liquid stools per day, but can't remember ever having a day where he had three or more. The stools are varied, loose to soft in nature. Reports dark brown, but denies melena or hematochezia. Reports taking chronic iron supplementation. Since transplant he has also had decreased appetite, he thinks the food doesn't take  good and it just doesn't seem appealing. Denies acid reflux, dysphagia or odynophagia. In terms of medications, cellcept was changed to myfortic on 12/2/2019 due to nausea. In the setting of JERONIMO, the patient has had hyperkalemia requiring intermittent kayexalate. His Viread was decreased on admission in the setting of his JERONIMO. He has received narcotics PRN pain.    He reports RLQ/RUQ pain that radiates to the back for the last three days, steady in nature, improved with heat, no change with eating or bowel movements.     Of note, after his caregiver left (prior to admission), he has been having issues with medication reconciliation. He had issues with hypoglycemia thought to be related to poor PO intake and skipping meals. When his blood sugars < 90, he feels tremulous and shaky.    Denies fevers, chills, chest pain or shortness of breath.          Past Medical History:     Past Medical History:   Diagnosis Date     Anemia 2013     Arthritis      BPH (benign prostatic hyperplasia)      CAD (coronary artery disease) 4/1/2019     Cholelithiasis      Conductive hearing loss 08/16/2017     Depressive disorder 1986    Suffer effects throughout life     Gastroesophageal reflux disease 12/01/2014     HCC (hepatocellular carcinoma) (H) 1/22/2019     History of diabetic retinopathy 07/2018     HTN (hypertension)      HTN (hypertension) 11/20/2019     Hyperlipidemia      Liver cirrhosis secondary to ESTRADA (H)      Liver transplanted (H) 11/11/2019     Portal vein thrombosis 4/11/2019     Type II diabetes mellitus (H)           Past Surgical History:     Past Surgical History:   Procedure Laterality Date     COLONOSCOPY      2015     CV HEART CATHETERIZATION WITH POSSIBLE INTERVENTION N/A 2/26/2019    Procedure: CORS;  Surgeon: Jagdish Hoyt MD;  Location: [facility] [place] CARDIAC CATH LAB     ESOPHAGOSCOPY, GASTROSCOPY, DUODENOSCOPY (EGD), COMBINED N/A 11/17/2016    Procedure: COMBINED ESOPHAGOSCOPY, GASTROSCOPY, DUODENOSCOPY  (EGD);  Surgeon: Santi Rosas MD;  Location: [facility] GI     ESOPHAGOSCOPY, GASTROSCOPY, DUODENOSCOPY (EGD), COMBINED N/A 2017    Procedure: COMBINED ESOPHAGOSCOPY, GASTROSCOPY, DUODENOSCOPY (EGD);  EGD;  Surgeon: Santi Rosas MD;  Location:  GI     ESOPHAGOSCOPY, GASTROSCOPY, DUODENOSCOPY (EGD), COMBINED N/A 2018    Procedure: EGD;  Surgeon: Santi Rosas MD;  Location:  OR     HEAD & NECK SURGERY      2017 at [facility].      IMPLANT GOLD WEIGHT EYELID Right 2017    Procedure: IMPLANT WEIGHT EYELID;  Right Upper Eyelid Weight, right tarsal strip lower eyelid;  Surgeon: Milana Malave MD;  Location:  OR     IR CHEMO EMBOLIZATION  2019     KNEE SURGERY Left      ORTHOPEDIC SURGERY       PAROTIDECTOMY, RADICAL NECK DISSECTION Right 2017    Procedure: PAROTIDECTOMY, RADICAL NECK DISSECTION;  Right Superfacial Parotidectomy , Facial nerve repair. with [facility] facial nerve monitor.;  Surgeon: Asiya Morgan MD;  Location: [facility] OR     PICC INSERTION Left 2017    4fr SL BioFlo PICC, 44cm in the L basilic vein w/ tip in the low SVC     RETURN LIVER TRANSPLANT N/A 2019    Procedure: Exploratory laparotomy, hematoma evacuation, abdominal washout;  Surgeon: Александр Ramos MD;  Location: [facility] OR     TRANSPLANT LIVER RECIPIENT  DONOR N/A 2019    Procedure: TRANSPLANT, LIVER, RECIPIENT,  DONOR;  Surgeon: Александр Ramos MD;  Location: [facility] OR     VASCULAR SURGERY            Medications:     Current Facility-Administered Medications   Medication     0.9% sodium chloride BOLUS     alpha-lipoic acid capsule 600 mg     aspirin (ASA) EC tablet 325 mg     cyanocobalamin (VITAMIN B-12) tablet 1,000 mcg     dextrose 10 % 1,000 mL infusion     dextrose 10% infusion     glucose gel 15-30 g    Or     dextrose 50 % injection 25-50 mL    Or     glucagon injection 1 mg     ferrous sulfate (FEROSUL) tablet 325 mg     hypromellose-dextran (ARTIFICAL  TEARS) 0.1-0.3 % ophthalmic solution 1 drop     insulin 1 unit/mL in saline (NovoLIN, HumuLIN Regular) drip - ADULT IV Infusion     insulin aspart (NovoLOG) inj (RAPID ACTING)     insulin aspart (NovoLOG) inj (RAPID ACTING)     insulin aspart (NovoLOG) inj (RAPID ACTING)     insulin aspart (NovoLOG) inj (RAPID ACTING)     insulin glargine (LANTUS PEN) injection 14 Units     insulin isophane human (HumuLIN N PEN) injection 15 Units     Lidocaine (LIDOCARE) 4 % Patch 1 patch     lidocaine (LMX4) cream     lidocaine 1 % 1 mL     lidocaine patch in PLACE     lidocaine patch REMOVAL     mirtazapine (REMERON) tablet 15 mg     mycophenolic acid (GENERIC EQUIVALENT) EC tablet 540 mg     naloxone (NARCAN) injection 0.1-0.4 mg     omeprazole (priLOSEC) CR capsule 40 mg     ondansetron (ZOFRAN-ODT) ODT tab 4 mg     ondansetron (ZOFRAN-ODT) ODT tab 4 mg     rosuvastatin (CRESTOR) tablet 5 mg     simethicone (MYLICON) chewable tablet 80 mg     sodium chloride (PF) 0.9% PF flush 3 mL     sodium chloride (PF) 0.9% PF flush 3 mL     sodium chloride 0.45% infusion     [START ON 12/6/2019] sulfamethoxazole-trimethoprim (BACTRIM/SEPTRA) 400-80 MG per tablet 1 tablet     tacrolimus (GENERIC EQUIVALENT) capsule 2.5 mg     tamsulosin (FLOMAX) capsule 0.4 mg     tenofovir (VIREAD) tablet 300 mg     traMADol (ULTRAM) half-tab 25 mg     [START ON 12/6/2019] valGANciclovir (VALCYTE) tablet 450 mg     Vitamin D3 (CHOLECALCIFEROL) 25 mcg (1000 units) tablet 2,000 Units          Allergies     Allergies   Allergen Reactions     Codeine Other (See Comments)     Cannot take due to liver  Cannot tolerate oral narcotics     Seasonal Allergies      Sneezing, coughing, runny and itchy eyes          Social History:   Reviewed and edited as appropriate  Social History     Socioeconomic History     Marital status:      Spouse name: Not on file     Number of children: Not on file     Years of education: Not on file     Highest education level: Not  on file   Occupational History     Not on file   Social Needs     Financial resource strain: Not on file     Food insecurity:     Worry: Not on file     Inability: Not on file     Transportation needs:     Medical: Not on file     Non-medical: Not on file   Tobacco Use     Smoking status: Never Smoker     Smokeless tobacco: Former User     Types: Chew     Tobacco comment: 1 tin per week   Substance and Sexual Activity     Alcohol use: No     Alcohol/week: 0.0 standard drinks     Comment: quit Sept. 1996     Drug use: No     Sexual activity: Not Currently     Partners: Female     Birth control/protection: Condom   Lifestyle     Physical activity:     Days per week: Not on file     Minutes per session: Not on file     Stress: Not on file   Relationships     Social connections:     Talks on phone: Not on file     Gets together: Not on file     Attends [employer] service: Not on file     Active member of club or organization: Not on file     Attends meetings of clubs or organizations: Not on file     Relationship status: Not on file     Intimate partner violence:     Fear of current or ex partner: Not on file     Emotionally abused: Not on file     Physically abused: Not on file     Forced sexual activity: Not on file   Other Topics Concern     Parent/sibling w/ CABG, MI or angioplasty before 65F 55M? Yes   Social History Narrative     Not on file   - Previous smoker (smokeless), quit  - Alcohol: Denies  - Lives alone, used to live in [place]  - Daughter in [place]  - Wife passed away  - Used to be a CPA        Family History:   Reviewed and edited as appropriate  Family History   Problem Relation Age of Onset     Prostate Cancer Maternal Grandfather      Substance Abuse Maternal Grandfather         Alcohol     Colon Cancer Father 60     Pancreatic Cancer Father 60     Prostate Cancer Father      Colorectal Cancer Father      Macular Degeneration Father      Cancer Father      Glaucoma Father      Skin Cancer Father       Colorectal Cancer Maternal Grandmother      Cancer Maternal Grandmother      Substance Abuse Maternal Grandmother         Alcohol     Colorectal Cancer Paternal Grandmother      Cancer Mother      Diabetes Mother          3/2016     Cerebrovascular Disease Mother         Passed away in Feb of this year, 80 years old.     Thyroid Disease Mother      Depression Mother      Asthma Sister         Had since birth     Thyroid Disease Sister      Depression Sister      Liver Disease No family hx of      Melanoma No family hx of           Review of Systems:   A complete 10 point review of systems was obtained.  Please see the HPI for pertinent positives and negatives.  All other systems were reviewed and were found to be negative.          Physical Exam:   VS:  /72   Pulse 91   Temp 98.6  F (37  C) (Oral)   Resp 24   Wt 75.8 kg (167 lb)   SpO2 98%   BMI 24.66 kg/m      Wt:   Wt Readings from Last 2 Encounters:   19 75.8 kg (167 lb)   19 74.8 kg (165 lb)      Constitutional: cooperative, pleasant, no acute distress  Eyes: Sclera anicteric/injected  HEENT: Normal oropharynx without ulcers or exudate, mucus membranes moist. NJ tube in place.   CV: RRR, no m/r/g  Respiratory: CTAB  Abd: Surgical scar with steri strips in place. Nondistended, bowel sounds present. TTP in RLQ and RUQ. No rebound.   Skin: warm, perfused. No Irene edema.   Rectal: Two small raised areas noted < 0.5, erythema around rectal area, no melena or hematochezia upon examination of stool in vault. No external hemorrhoids.   Neuro: AAO x 3  - Tremulous against gravity in bilateral lower extremities.   Psych: Normal affect         Laboratory:   BMP  Recent Labs   Lab 19  0605 19  1342 19  0446 19  1941 19  1238 19  0536  19  1034    144 146*  --   --  138  --  131*   POTASSIUM 4.7  --  4.8 4.9 5.4* 6.0*   < > 5.5*   CHLORIDE 119*  --  124*  --   --  117*  --  106   DEBORAH 8.5  --  8.4*   --   --  8.9  --  9.4   CO2 15*  --  15*  --   --  12*  --  14*   BUN 49*  --  61*  --   --  75*  --  86*   CR 2.10*  --  2.46*  --   --  2.94*  --  3.17*   *  --  181*  --   --  173*  --  194*    < > = values in this interval not displayed.     CBC  Recent Labs   Lab 12/05/19  0605 12/04/19  0625 12/04/19 0446 12/03/19  0644 12/03/19  0536 12/02/19  1034   WBC 3.5*  --  3.4*  --  4.4 6.7   RBC 2.23*  --  1.91*  --  2.08* 2.41*   HGB 6.9* 6.5* 6.0* 7.3* 6.4* 7.6*   HCT 21.3*  --  19.1*  --  20.4* 23.4*   MCV 96  --  100  --  98 97   MCH 30.9  --  31.4  --  30.8 31.5   MCHC 32.4  --  31.4*  --  31.4* 32.5   RDW 19.0*  --  20.7*  --  20.3* 19.9*   *  --  132*  --  142* 164     INR  Recent Labs   Lab 12/05/19  0824   INR 1.14     LFTs  Recent Labs   Lab 12/05/19  0605 12/04/19 0446 12/03/19  0536 12/02/19  1034   ALKPHOS 164* 160* 148 185*   AST 15 14 13 16   ALT 21 18 21 26   BILITOTAL 0.6 0.4 0.3 0.5   PROTTOTAL 5.9* 6.2* 6.4* 7.5   ALBUMIN 2.3* 2.4* 2.4* 3.0*      Lactate 0.6  Tacrolimus 6.0 (12/5/2019)    Stool Enteric pathogen panel: pan negative  C. Diff: Negative  Adenovirus: Negative  CMV DNA: Not detected         Imaging/Procedures/Studies:   Abdominal US 12/4/2019:  1.  Shifting fluid collection surrounding the liver transplant with  increase in the total amount of fluid surrounding the liver when  compared to prior. Hepatic parenchyma is normal in appearance.  2. Patent liver vasculature.

## 2019-12-05 NOTE — PROVIDER NOTIFICATION
Notified Resident at 2334 PM regarding pain.      Spoke with:  TRACE Figueroa MD    Orders were obtained.    Comments: MD notified of pt's increased abdominal pain radiating to right flank unrelieved with heating pad & repositioning. MD ordered prn Tramadol & administered. Will continue to monitor pt's pain & treat accordingly.

## 2019-12-06 ENCOUNTER — APPOINTMENT (OUTPATIENT)
Dept: OCCUPATIONAL THERAPY | Facility: CLINIC | Age: 54
DRG: 682 | End: 2019-12-06
Attending: TRANSPLANT SURGERY
Payer: MEDICARE

## 2019-12-06 ENCOUNTER — APPOINTMENT (OUTPATIENT)
Dept: GENERAL RADIOLOGY | Facility: CLINIC | Age: 54
DRG: 682 | End: 2019-12-06
Attending: PHYSICIAN ASSISTANT
Payer: MEDICARE

## 2019-12-06 PROBLEM — D64.9 ANEMIA: Status: ACTIVE | Noted: 2019-12-06

## 2019-12-06 PROBLEM — E46 MALNUTRITION (H): Status: ACTIVE | Noted: 2019-12-06

## 2019-12-06 PROBLEM — K31.9 GASTROPATHY: Status: ACTIVE | Noted: 2019-12-06

## 2019-12-06 LAB
ALBUMIN SERPL-MCNC: 2.3 G/DL (ref 3.4–5)
ALP SERPL-CCNC: 130 U/L (ref 40–150)
ALT SERPL W P-5'-P-CCNC: 19 U/L (ref 0–70)
ANION GAP SERPL CALCULATED.3IONS-SCNC: 11 MMOL/L (ref 3–14)
AST SERPL W P-5'-P-CCNC: 13 U/L (ref 0–45)
BASOPHILS # BLD AUTO: 0 10E9/L (ref 0–0.2)
BASOPHILS NFR BLD AUTO: 0.6 %
BILIRUB DIRECT SERPL-MCNC: 0.1 MG/DL (ref 0–0.2)
BILIRUB SERPL-MCNC: 0.3 MG/DL (ref 0.2–1.3)
BUN SERPL-MCNC: 33 MG/DL (ref 7–30)
C PARVUM AG STL QL IA: NEGATIVE
CALCIUM SERPL-MCNC: 8.4 MG/DL (ref 8.5–10.1)
CHLORIDE SERPL-SCNC: 116 MMOL/L (ref 94–109)
CO2 SERPL-SCNC: 18 MMOL/L (ref 20–32)
COLONOSCOPY: NORMAL
COPATH REPORT: NORMAL
CREAT SERPL-MCNC: 1.87 MG/DL (ref 0.66–1.25)
DIFFERENTIAL METHOD BLD: ABNORMAL
EOSINOPHIL # BLD AUTO: 0.2 10E9/L (ref 0–0.7)
EOSINOPHIL NFR BLD AUTO: 5 %
ERYTHROCYTE [DISTWIDTH] IN BLOOD BY AUTOMATED COUNT: 17.9 % (ref 10–15)
G LAMBLIA AG STL QL IA: NEGATIVE
GFR SERPL CREATININE-BSD FRML MDRD: 39 ML/MIN/{1.73_M2}
GLUCOSE BLDC GLUCOMTR-MCNC: 110 MG/DL (ref 70–99)
GLUCOSE BLDC GLUCOMTR-MCNC: 125 MG/DL (ref 70–99)
GLUCOSE BLDC GLUCOMTR-MCNC: 128 MG/DL (ref 70–99)
GLUCOSE BLDC GLUCOMTR-MCNC: 140 MG/DL (ref 70–99)
GLUCOSE BLDC GLUCOMTR-MCNC: 184 MG/DL (ref 70–99)
GLUCOSE SERPL-MCNC: 134 MG/DL (ref 70–99)
HAPTOGLOB SERPL-MCNC: 206 MG/DL (ref 15–200)
HCT VFR BLD AUTO: 22.4 % (ref 40–53)
HGB BLD-MCNC: 7.4 G/DL (ref 13.3–17.7)
IMM GRANULOCYTES # BLD: 0.1 10E9/L (ref 0–0.4)
IMM GRANULOCYTES NFR BLD: 2 %
LACTATE BLD-SCNC: 0.4 MMOL/L (ref 0.7–2)
LDH SERPL L TO P-CCNC: 124 U/L (ref 85–227)
LYMPHOCYTES # BLD AUTO: 0.4 10E9/L (ref 0.8–5.3)
LYMPHOCYTES NFR BLD AUTO: 11.7 %
MAGNESIUM SERPL-MCNC: 1.3 MG/DL (ref 1.6–2.3)
MCH RBC QN AUTO: 30.8 PG (ref 26.5–33)
MCHC RBC AUTO-ENTMCNC: 33 G/DL (ref 31.5–36.5)
MCV RBC AUTO: 93 FL (ref 78–100)
MONOCYTES # BLD AUTO: 0.2 10E9/L (ref 0–1.3)
MONOCYTES NFR BLD AUTO: 5.3 %
NEUTROPHILS # BLD AUTO: 2.6 10E9/L (ref 1.6–8.3)
NEUTROPHILS NFR BLD AUTO: 75.4 %
NRBC # BLD AUTO: 0 10*3/UL
NRBC BLD AUTO-RTO: 0 /100
O+P STL MICRO: NORMAL
O+P STL MICRO: NORMAL
PHOSPHATE SERPL-MCNC: 1.8 MG/DL (ref 2.5–4.5)
PLATELET # BLD AUTO: 110 10E9/L (ref 150–450)
POTASSIUM SERPL-SCNC: 3.8 MMOL/L (ref 3.4–5.3)
PROT SERPL-MCNC: 5.8 G/DL (ref 6.8–8.8)
RBC # BLD AUTO: 2.4 10E12/L (ref 4.4–5.9)
RETICS # AUTO: 30.7 10E9/L (ref 25–95)
RETICS/RBC NFR AUTO: 1.3 % (ref 0.5–2)
SODIUM SERPL-SCNC: 144 MMOL/L (ref 133–144)
SPECIMEN SOURCE: NORMAL
SPECIMEN SOURCE: NORMAL
TACROLIMUS BLD-MCNC: 7.9 UG/L (ref 5–15)
TME LAST DOSE: NORMAL H
UPPER GI ENDOSCOPY: NORMAL
WBC # BLD AUTO: 3.4 10E9/L (ref 4–11)
ZINC SERPL-MCNC: 49.9 UG/DL (ref 60–120)

## 2019-12-06 PROCEDURE — 83615 LACTATE (LD) (LDH) ENZYME: CPT | Performed by: PHYSICIAN ASSISTANT

## 2019-12-06 PROCEDURE — 12000026 ZZH R&B TRANSPLANT

## 2019-12-06 PROCEDURE — 83010 ASSAY OF HAPTOGLOBIN QUANT: CPT | Performed by: PHYSICIAN ASSISTANT

## 2019-12-06 PROCEDURE — G0500 MOD SEDAT ENDO SERVICE >5YRS: HCPCS | Performed by: INTERNAL MEDICINE

## 2019-12-06 PROCEDURE — 97530 THERAPEUTIC ACTIVITIES: CPT | Mod: GO

## 2019-12-06 PROCEDURE — 80076 HEPATIC FUNCTION PANEL: CPT | Performed by: PHYSICIAN ASSISTANT

## 2019-12-06 PROCEDURE — 88342 IMHCHEM/IMCYTCHM 1ST ANTB: CPT | Performed by: INTERNAL MEDICINE

## 2019-12-06 PROCEDURE — 80048 BASIC METABOLIC PNL TOTAL CA: CPT | Performed by: PHYSICIAN ASSISTANT

## 2019-12-06 PROCEDURE — 00000146 ZZHCL STATISTIC GLUCOSE BY METER IP

## 2019-12-06 PROCEDURE — 85045 AUTOMATED RETICULOCYTE COUNT: CPT | Performed by: PHYSICIAN ASSISTANT

## 2019-12-06 PROCEDURE — 0DBE8ZX EXCISION OF LARGE INTESTINE, VIA NATURAL OR ARTIFICIAL OPENING ENDOSCOPIC, DIAGNOSTIC: ICD-10-PCS | Performed by: INTERNAL MEDICINE

## 2019-12-06 PROCEDURE — 25000131 ZZH RX MED GY IP 250 OP 636 PS 637: Mod: GY | Performed by: PHYSICIAN ASSISTANT

## 2019-12-06 PROCEDURE — 25000132 ZZH RX MED GY IP 250 OP 250 PS 637: Mod: GY | Performed by: INTERNAL MEDICINE

## 2019-12-06 PROCEDURE — 40000611 ZZHCL STATISTIC MORPHOLOGY W/INTERP HEMEPATH TC 85060: Performed by: PHYSICIAN ASSISTANT

## 2019-12-06 PROCEDURE — 0DB78ZX EXCISION OF STOMACH, PYLORUS, VIA NATURAL OR ARTIFICIAL OPENING ENDOSCOPIC, DIAGNOSTIC: ICD-10-PCS | Performed by: INTERNAL MEDICINE

## 2019-12-06 PROCEDURE — 25800029 ZZH RX IP 258 OP 250: Performed by: NURSE PRACTITIONER

## 2019-12-06 PROCEDURE — 25000132 ZZH RX MED GY IP 250 OP 250 PS 637: Mod: GY | Performed by: TRANSPLANT SURGERY

## 2019-12-06 PROCEDURE — 43239 EGD BIOPSY SINGLE/MULTIPLE: CPT | Performed by: INTERNAL MEDICINE

## 2019-12-06 PROCEDURE — 25000132 ZZH RX MED GY IP 250 OP 250 PS 637: Mod: GY | Performed by: NURSE PRACTITIONER

## 2019-12-06 PROCEDURE — 36415 COLL VENOUS BLD VENIPUNCTURE: CPT | Performed by: PHYSICIAN ASSISTANT

## 2019-12-06 PROCEDURE — 25000128 H RX IP 250 OP 636: Performed by: PHYSICIAN ASSISTANT

## 2019-12-06 PROCEDURE — 25000125 ZZHC RX 250: Performed by: INTERNAL MEDICINE

## 2019-12-06 PROCEDURE — 99153 MOD SED SAME PHYS/QHP EA: CPT | Performed by: INTERNAL MEDICINE

## 2019-12-06 PROCEDURE — 85004 AUTOMATED DIFF WBC COUNT: CPT | Performed by: PHYSICIAN ASSISTANT

## 2019-12-06 PROCEDURE — 25000128 H RX IP 250 OP 636: Performed by: INTERNAL MEDICINE

## 2019-12-06 PROCEDURE — 80197 ASSAY OF TACROLIMUS: CPT | Performed by: NURSE PRACTITIONER

## 2019-12-06 PROCEDURE — 25000131 ZZH RX MED GY IP 250 OP 636 PS 637: Mod: GY | Performed by: NURSE PRACTITIONER

## 2019-12-06 PROCEDURE — 0DB98ZX EXCISION OF DUODENUM, VIA NATURAL OR ARTIFICIAL OPENING ENDOSCOPIC, DIAGNOSTIC: ICD-10-PCS | Performed by: INTERNAL MEDICINE

## 2019-12-06 PROCEDURE — 88305 TISSUE EXAM BY PATHOLOGIST: CPT | Performed by: INTERNAL MEDICINE

## 2019-12-06 PROCEDURE — 45380 COLONOSCOPY AND BIOPSY: CPT | Performed by: INTERNAL MEDICINE

## 2019-12-06 PROCEDURE — 25000132 ZZH RX MED GY IP 250 OP 250 PS 637: Mod: GY | Performed by: PHYSICIAN ASSISTANT

## 2019-12-06 PROCEDURE — 40000986 XR ABDOMEN 1 VW

## 2019-12-06 PROCEDURE — 83605 ASSAY OF LACTIC ACID: CPT

## 2019-12-06 PROCEDURE — 83735 ASSAY OF MAGNESIUM: CPT | Performed by: PHYSICIAN ASSISTANT

## 2019-12-06 PROCEDURE — 84100 ASSAY OF PHOSPHORUS: CPT | Performed by: PHYSICIAN ASSISTANT

## 2019-12-06 PROCEDURE — 85027 COMPLETE CBC AUTOMATED: CPT | Performed by: PHYSICIAN ASSISTANT

## 2019-12-06 PROCEDURE — 88313 SPECIAL STAINS GROUP 2: CPT | Performed by: INTERNAL MEDICINE

## 2019-12-06 RX ORDER — SIMETHICONE
LIQUID (ML) MISCELLANEOUS PRN
Status: DISCONTINUED | OUTPATIENT
Start: 2019-12-06 | End: 2019-12-06 | Stop reason: HOSPADM

## 2019-12-06 RX ORDER — MAGNESIUM OXIDE 400 MG/1
400 TABLET ORAL 2 TIMES DAILY
Status: DISCONTINUED | OUTPATIENT
Start: 2019-12-06 | End: 2019-12-10 | Stop reason: HOSPADM

## 2019-12-06 RX ORDER — FENTANYL CITRATE 50 UG/ML
INJECTION, SOLUTION INTRAMUSCULAR; INTRAVENOUS PRN
Status: DISCONTINUED | OUTPATIENT
Start: 2019-12-06 | End: 2019-12-06 | Stop reason: HOSPADM

## 2019-12-06 RX ADMIN — VALGANCICLOVIR 450 MG: 450 TABLET, FILM COATED ORAL at 11:31

## 2019-12-06 RX ADMIN — CARVEDILOL 12.5 MG: 12.5 TABLET, FILM COATED ORAL at 18:16

## 2019-12-06 RX ADMIN — Medication 400 MG: at 14:08

## 2019-12-06 RX ADMIN — OMEPRAZOLE 40 MG: 20 CAPSULE, DELAYED RELEASE ORAL at 11:29

## 2019-12-06 RX ADMIN — ROSUVASTATIN CALCIUM 5 MG: 5 TABLET, FILM COATED ORAL at 20:04

## 2019-12-06 RX ADMIN — MIRTAZAPINE 15 MG: 15 TABLET, FILM COATED ORAL at 22:41

## 2019-12-06 RX ADMIN — DIBASIC SODIUM PHOSPHATE, MONOBASIC POTASSIUM PHOSPHATE AND MONOBASIC SODIUM PHOSPHATE 250 MG: 852; 155; 130 TABLET ORAL at 20:04

## 2019-12-06 RX ADMIN — FERROUS SULFATE TAB 325 MG (65 MG ELEMENTAL FE) 325 MG: 325 (65 FE) TAB at 14:08

## 2019-12-06 RX ADMIN — ASPIRIN 325 MG: 325 TABLET, DELAYED RELEASE ORAL at 11:30

## 2019-12-06 RX ADMIN — SODIUM CHLORIDE: 4.5 INJECTION, SOLUTION INTRAVENOUS at 03:02

## 2019-12-06 RX ADMIN — SODIUM BICARBONATE 650 MG TABLET 650 MG: at 20:03

## 2019-12-06 RX ADMIN — MYCOPHENOLIC ACID 540 MG: 360 TABLET, DELAYED RELEASE ORAL at 11:32

## 2019-12-06 RX ADMIN — INSULIN ASPART 1 UNITS: 100 INJECTION, SOLUTION INTRAVENOUS; SUBCUTANEOUS at 18:16

## 2019-12-06 RX ADMIN — POLYETHYLENE GLYCOL 3350, SODIUM SULFATE ANHYDROUS, SODIUM BICARBONATE, SODIUM CHLORIDE, POTASSIUM CHLORIDE 2000 ML: 236; 22.74; 6.74; 5.86; 2.97 POWDER, FOR SOLUTION ORAL at 03:01

## 2019-12-06 RX ADMIN — FERROUS SULFATE TAB 325 MG (65 MG ELEMENTAL FE) 325 MG: 325 (65 FE) TAB at 11:31

## 2019-12-06 RX ADMIN — SULFAMETHOXAZOLE AND TRIMETHOPRIM 1 TABLET: 400; 80 TABLET ORAL at 11:29

## 2019-12-06 RX ADMIN — PRENATAL VIT W/ FE FUMARATE-FA TAB 27-0.8 MG 1 TABLET: 27-0.8 TAB at 11:31

## 2019-12-06 RX ADMIN — TACROLIMUS 2.5 MG: 1 CAPSULE ORAL at 11:30

## 2019-12-06 RX ADMIN — ONDANSETRON 4 MG: 4 TABLET, ORALLY DISINTEGRATING ORAL at 03:01

## 2019-12-06 RX ADMIN — ONDANSETRON 4 MG: 4 TABLET, ORALLY DISINTEGRATING ORAL at 18:31

## 2019-12-06 RX ADMIN — CYANOCOBALAMIN TAB 1000 MCG 1000 MCG: 1000 TAB at 18:16

## 2019-12-06 RX ADMIN — DIBASIC SODIUM PHOSPHATE, MONOBASIC POTASSIUM PHOSPHATE AND MONOBASIC SODIUM PHOSPHATE 250 MG: 852; 155; 130 TABLET ORAL at 11:45

## 2019-12-06 RX ADMIN — MELATONIN 2000 UNITS: at 18:16

## 2019-12-06 RX ADMIN — Medication 600 MG: at 18:16

## 2019-12-06 RX ADMIN — CARVEDILOL 12.5 MG: 12.5 TABLET, FILM COATED ORAL at 11:29

## 2019-12-06 RX ADMIN — TAMSULOSIN HYDROCHLORIDE 0.4 MG: 0.4 CAPSULE ORAL at 20:05

## 2019-12-06 RX ADMIN — SODIUM CHLORIDE: 4.5 INJECTION, SOLUTION INTRAVENOUS at 15:21

## 2019-12-06 RX ADMIN — MYCOPHENOLIC ACID 540 MG: 360 TABLET, DELAYED RELEASE ORAL at 20:03

## 2019-12-06 RX ADMIN — TACROLIMUS 2.5 MG: 1 CAPSULE ORAL at 18:31

## 2019-12-06 RX ADMIN — SODIUM BICARBONATE 650 MG TABLET 650 MG: at 11:33

## 2019-12-06 RX ADMIN — Medication 400 MG: at 20:03

## 2019-12-06 ASSESSMENT — ACTIVITIES OF DAILY LIVING (ADL)
ADLS_ACUITY_SCORE: 16

## 2019-12-06 NOTE — OR NURSING
Procedure: EGD with biopsies and colonoscopy with biopsies  Sedation: conscious sedation   Specimens: x3, sent to lab.   O2: 2L/NC   Tolerated procedure: well  Pt to recovery area in stable condition accompanied by RN.   Other:  Will call report to 7A. Pt to return to inpt bed when transport available.     Fallon Marroquin RN

## 2019-12-06 NOTE — PROGRESS NOTES
IP Diabetes Management  Daily Note           Assessment and Plan:   HPI: Mr. Miller Workman is a 55 yo man with a history of type 2 diabetes complicated by peripheral neuropathy and retinopathy, cirrhosis secondary to ESTRADA, HCC, HTN, HLD, GERD, BPH, who is s/p DBD liver transplant on 11/11/19, who was readmitted 12/2/19 with acute renal failure, confusion, and elevated tacrolimus level.     Assessment:   1)Type II Diabetes Mellitus  2)Enteral feed hyperglycemia     Beginning cycled tube feeds beginning tonight, will run [id number] (did not run as planned last night related to NPO/bowel prep).    Plan:    -Lantus 14 units daily AM   -NPH to be given at start of tube feed cycle (1800): starting with 15 units (1 per 8g CHO ratio)   -continue aspart 1:10g CHO coverage with meals and snacks/supplements   -aspart moderate intensity sliding scale AC during the day, and at 2200/0200 overnight (cycled enteral feeds)   -PRN D10% to run at nutritional rate if tube feeds are interrupted, with NPH on board   -BG monitoring AC, HS, 0200   -hypoglycemia protocol   -mod CHO diet with carb counting protocol   -please alert IP Diabetes team with plans for NPO status, procedures    -diabetes education completed by PLC upon this admission; may require follow up CDE visit when insulin regimen is known prior to discharge home.     Outpatient follow up: with [facility]  Plan discussed with patient.     Interval History and Assessment: interval glucose trend reviewed:  Cycled TF did not run overnight as decision was made to pursue EGD/colonoscopy. NPH was appropriately held.  Mild gastropathy noted. PPI recommended.   Fluid collections surrounding liver transplant noted, worse than previous imaging; obtaining MRI today.   Saw Miller in hallway en route to imaging, not back upon two additional attempts to visit.     Glycemic control remains excellent currently; Miller is not eating, feels nauseated, and TF had not yet been cycled.  "    Current nutritional intake and type: Orders Placed This Encounter      Regular Diet Adult    PTA Diabetes Regimen:   [employer] Expert for glucose monitoring and calculating aspart doses-(settings input into meter for calculation, he only has to enter his BG and CHO intake). Settings: \"meal rise\"- 50mg/dL. Snack size-10g, active insulin time 3 hrs.     Insulin regimen: (most recently, though with multiple dose changes since hospital discharge due to hypoglycemia)  Tresiba 15 units daily AM   aspart 1unit/10 g CHO  aspart 1unit/40 for BG >140     Held since transplant:   Metformin ER 500mg with supper  Farxiga 10mg daily    Discharge Planning: unknown, anticipating discharge to TCU.         Diabetes History:   Type of Diabetes: Type 2 Diabetes Mellitus, TPN/Enteral Feeding Induced Hyperglycemia  Lab Results   Component Value Date    A1C 6.6 08/08/2018    A1C 6.5 06/09/2017    A1C 7.8 10/25/2016              Review of Systems:   The Review of Systems is negative other than noted in the Interval History.           Medications:     Current Facility-Administered Medications   Medication     0.9% sodium chloride BOLUS     alpha-lipoic acid capsule 600 mg     aspirin (ASA) EC tablet 325 mg     carvedilol (COREG) tablet 12.5 mg     cyanocobalamin (VITAMIN B-12) tablet 1,000 mcg     dextrose 10 % 1,000 mL infusion     dextrose 10% infusion     glucose gel 15-30 g    Or     dextrose 50 % injection 25-50 mL    Or     glucagon injection 1 mg     ferrous sulfate (FEROSUL) tablet 325 mg     hypromellose-dextran (ARTIFICAL TEARS) 0.1-0.3 % ophthalmic solution 1 drop     insulin 1 unit/mL in saline (NovoLIN, HumuLIN Regular) drip - ADULT IV Infusion     insulin aspart (NovoLOG) inj (RAPID ACTING)     insulin aspart (NovoLOG) inj (RAPID ACTING)     insulin aspart (NovoLOG) inj (RAPID ACTING)     insulin aspart (NovoLOG) inj (RAPID ACTING)     insulin glargine (LANTUS PEN) injection 14 Units     insulin isophane human " (HumuLIN N PEN) injection 15 Units     Lidocaine (LIDOCARE) 4 % Patch 1 patch     lidocaine (LMX4) cream     lidocaine 1 % 1 mL     lidocaine patch in PLACE     lidocaine patch REMOVAL     magnesium oxide (MAG-OX) tablet 400 mg     mirtazapine (REMERON) tablet 15 mg     mycophenolic acid (GENERIC EQUIVALENT) EC tablet 540 mg     naloxone (NARCAN) injection 0.1-0.4 mg     omeprazole (priLOSEC) CR capsule 40 mg     ondansetron (ZOFRAN) tablet 4 mg     ondansetron (ZOFRAN-ODT) ODT tab 4 mg     phosphorus tablet 250 mg (PHOSPHA 250 NEUTRAL) per tablet 250 mg     prenatal multivitamin w/iron per tablet 1 tablet     prochlorperazine (COMPAZINE) tablet 5 mg     rosuvastatin (CRESTOR) tablet 5 mg     simethicone (MYLICON) chewable tablet 80 mg     sodium bicarbonate tablet 650 mg     sodium chloride (PF) 0.9% PF flush 3 mL     sodium chloride (PF) 0.9% PF flush 3 mL     sodium chloride 0.45% infusion     sulfamethoxazole-trimethoprim (BACTRIM/SEPTRA) 400-80 MG per tablet 1 tablet     tacrolimus (GENERIC EQUIVALENT) capsule 2.5 mg     tamsulosin (FLOMAX) capsule 0.4 mg     tenofovir (VIREAD) tablet 300 mg     traMADol (ULTRAM) half-tab 25 mg     valGANciclovir (VALCYTE) tablet 450 mg     Vitamin D3 (CHOLECALCIFEROL) 25 mcg (1000 units) tablet 2,000 Units            Physical Exam:    /74 (BP Location: Right arm)   Pulse 86   Temp 98.4  F (36.9  C) (Oral)   Resp 20   Wt 77 kg (169 lb 11.2 oz)   SpO2 97%   BMI 25.06 kg/m    General: pleasant, in no distress, being wheeled to imaging.  HEENT: normocephalic, atraumatic. Oral mucous membranes moist. NGT bridled, in place.   Lungs: unlabored respiration, no cough  ABD: rounded, soft, no lipodystrophy noted  Skin: warm and dry, no obvious lesions  MSK:  moves all extremities  Lymp:  no LE edema   Mental status: awake, alert.  Psych: normal affect, calm and appropriate interaction.             Data:     Recent Labs   Lab 12/06/19  1139 12/06/19  0803 12/06/19  6255  12/06/19  0211 12/05/19  2135 12/05/19  1640 12/05/19  1400  12/05/19  0605  12/04/19  0446  12/03/19  0536  12/02/19  1034   GLC  --   --  134*  --   --   --   --   --  226*  --  181*  --  173*  --  194*   * 125*  --  110* 111* 114* 145*   < >  --    < >  --    < >  --    < >  --     < > = values in this interval not displayed.     Lab Results   Component Value Date    WBC 3.4 (L) 12/06/2019    WBC 3.5 (L) 12/05/2019    WBC 3.4 (L) 12/04/2019    HGB 7.4 (L) 12/06/2019    HGB 6.9 (LL) 12/05/2019    HGB 6.5 (LL) 12/04/2019    HCT 22.4 (L) 12/06/2019    HCT 21.3 (L) 12/05/2019    HCT 19.1 (L) 12/04/2019    MCV 93 12/06/2019    MCV 96 12/05/2019     12/04/2019     (L) 12/06/2019     (L) 12/05/2019     (L) 12/04/2019     Lab Results   Component Value Date     12/06/2019     12/05/2019     12/04/2019    POTASSIUM 3.8 12/06/2019    POTASSIUM 4.7 12/05/2019    POTASSIUM 4.8 12/04/2019    CHLORIDE 116 (H) 12/06/2019    CHLORIDE 119 (H) 12/05/2019    CHLORIDE 124 (H) 12/04/2019    CO2 18 (L) 12/06/2019    CO2 15 (L) 12/05/2019    CO2 15 (L) 12/04/2019     (H) 12/06/2019     (H) 12/05/2019     (H) 12/04/2019     Lab Results   Component Value Date    BUN 33 (H) 12/06/2019    BUN 49 (H) 12/05/2019    BUN 61 (H) 12/04/2019     Lab Results   Component Value Date    TSH 0.49 08/08/2018    TSH 0.71 02/08/2018    TSH 0.42 06/09/2017     Lab Results   Component Value Date    AST 13 12/06/2019    AST 15 12/05/2019    AST 14 12/04/2019    ALT 19 12/06/2019    ALT 21 12/05/2019    ALT 18 12/04/2019    ALKPHOS 130 12/06/2019    ALKPHOS 164 (H) 12/05/2019    ALKPHOS 160 (H) 12/04/2019       Diabetes Management Team job code: [id number]  I spent a total of 25 minutes bedside and on the inpatient unit managing glycemic care. Over 50% of my time on the unit was spent counseling the patient and/or coordinating care regarding acute hyperglycemia management.  See note for  details.    Lanie Atkinson PA-C  Inpatient Diabetes Management Service  Pager [phone]

## 2019-12-06 NOTE — PLAN OF CARE
PT-7A: attempted x 2 to see pt for PT session; procedure in am and eating lunch in pm. Have rescheduled PT session to 12/9/19 per PT POC

## 2019-12-06 NOTE — PLAN OF CARE
OT/7A - Discharge Planner OT   Patient plan for discharge: open to rehab  Current status: Facilitated functional mobility ~200 feet x2 while pushing IV pole with B hand hold, working to increase activity tolerance for ADL/IADL. Implemented sequencing activity sheet; pt accurately and independently completes 20/20 sequencing questions. Fatigue noted throughout as pt reports limited sleep overnight due to bowel prep.  Barriers to return to prior living situation: limited support, cognition, activity tolerance, post surgical precautions  Recommendations for discharge: TCU  Rationale for recommendations: Pt recently discharged to home following liver transplant and was readmitted due to JERONIMO, nausea, diarrhea, confusion and weakness per chart review. Would recommend TCU stay for increased IND and safety with all ADL/IADL prior to return home, as pt has limited social support.        Entered by: Josefa Acuna 12/06/2019 8:04 AM

## 2019-12-06 NOTE — DISCHARGE SUMMARY
I evaluated the patient today.  I reviewed the discharge plan with the fellow, and agree with the final assessment and plan for discharge as noted in the discharge summary.  I personally spent  30 minutes or less  today on discharge activities for this patient.      Yonathan Chino MD, JALEESA  Professor of Surgery  [employer]  Surgical Director of Liver Transplant Program  Executive Medical Director [facility]    Frandy Workman MRN# [id number]   Age: 54 year old YOB: 1965     Date of Admission:  12/2/2019  Date of Discharge::  12/10/2019  Admitting Physician:  Berlin Hernandez MD  Discharge Physician:  Maryanne Recio PA-C/Yonathan Chino MD         Admission Diagnoses:   JERONIMO  Confusion  Dehydration          Discharge Diagnosis:   Active Problems:    Immunosuppressed status (H)    Diarrhea    JERONIMO (acute kidney injury) (H)    Malnutrition (H)    Gastropathy    Anemia         Procedures:   Gastroenterology Endoscopy Suite Brief Operative Note     Procedure:  Upper endoscopy + Colonoscopy   Post-operative diagnosis: - Gastropathy, mild  - Mild ileal erythema   Staff Physician:  Dr. Morton   Fellow/Assistant(s):  Dr. Amaya   Specimens:  Please see final procedure note for further details.   Findings:  - Gastropathy, mild; otherwise normal esophagus and duodenum  - Mild ileal erythema; normal colon including retroflexion   Complications:  None.   Condition:  Stable   Recommendations  Diet:  Return to previous diet  PPI:  PPI po once daily  Anti-coagulants/platelets:  N/A  Discharge Planning:   - F/u pathology     Feeding tube placement          Medications Prior to Admission:     Facility-Administered Medications Prior to Admission   Medication Dose Route Frequency Provider Last Rate Last Dose     Aflibercept (EYLEA) injection 2 mg  2 mg Intravitreal Q28 Days Enriqueta Martin MD   2 mg at 06/27/19 7007      ranibizumab (LUCENTIS) injection syringe 0.5 mg  0.5 mg Intravitreal Q28 Days Enriqueta Martin MD         Medications Prior to Admission   Medication Sig Dispense Refill Last Dose     alpha-lipoic acid 600 MG capsule Take 1 capsule (600 mg) by mouth daily 90 capsule 3 12/2/2019 at Unknown time     Artificial Tear Solution (SM ARTIFICIAL TEARS) SOLN Place 1 drop into the right eye every hour as needed Apply at least 4 times daily and as needed for dry eye 1 Bottle 3 12/2/2019 at Unknown time     aspirin (ASA) 325 MG EC tablet Take 1 tablet (325 mg) by mouth daily 30 tablet 3 12/2/2019 at Unknown time     carvedilol (COREG) 25 MG tablet Take 0.5 tablets (12.5 mg) by mouth 2 times daily (with meals) 60 tablet 3 12/2/2019 at Unknown time     econazole nitrate 1 % external cream Apply topically daily To feet and toenails. 85 g 0 Past Month at Unknown time     ferrous sulfate (FEROSUL) 325 (65 Fe) MG tablet Take 1 tablet (325 mg) by mouth 3 times daily (with meals) 90 tablet 3 12/2/2019 at Unknown time     glucose (BD GLUCOSE) 4 g chewable tablet Take 1 tablet by mouth every hour as needed for low blood sugar 60 tablet 1 Past Month at Unknown time     insulin aspart (NOVOLOG FLEXPEN) 100 UNIT/ML pen Inject 1-7 Units Subcutaneous 3 times daily (with meals) DOSE:  1 units per 10 grams of carbohydrate.  Only chart total amount of units given.  Do not give if pre-prandial glucose is less than 60 mg/dL. If given at mealtime, administer within 30 minutes of start of meal   12/2/2019 at Unknown time     insulin aspart (NOVOLOG PEN) 100 UNIT/ML pen Inject 1-7 Units Subcutaneous Take with snacks or supplements for high blood sugar DOSE:  1 units per 5 grams of carbohydrate. Only chart total amount of units given.  Do not give if pre-prandial glucose is less than 60 mg/dL. If given at mealtime, administer within 30 minutes of start of meal        insulin aspart (NOVOLOG PEN) 100 UNIT/ML pen Inject 1-10 Units Subcutaneous 3  times daily (with meals) Correction Scale - custom DOSING     Do Not give Correction Insulin if Pre-Meal BG less than 140    to 159 give 1 units.    to 179 give 2 units.    to 199 give 3 units.    to 219 give 4 units.    to 239 give 5 units.    to 259 give 6 units.    to 279 give 7 units.    to 299 give 8 units.    to 319 give 9 units.    to 339 give 10 units.   To be given with prandial insulin, and based on pre-meal blood glucose.   Notify provider if glucose greater than or equal 340 mg/dL after administration. If given at mealtime, administer within 30 minutes of start of meal 3 mL  12/2/2019 at Unknown time     insulin degludec (TRESIBA) 200 UNIT/ML pen Inject 14 Units Subcutaneous daily  100 mL 3 12/2/2019 at Unknown time     loperamide (IMODIUM) 2 MG capsule Take 1 capsule (2 mg) by mouth 4 times daily as needed for diarrhea 20 capsule 1 Past Month at Unknown time     Multiple Vitamin (THERAVITE PO) Take 1 tablet by mouth every morning    12/2/2019 at Unknown time     mycophenolic acid (GENERIC EQUIVALENT) 180 MG EC tablet Take 3 tablets (540 mg) by mouth every 12 hours 180 tablet 3 12/2/2019 at Unknown time     omeprazole (PRILOSEC) 40 MG DR capsule Take 1 capsule (40 mg) by mouth daily 90 capsule 2 12/2/2019 at Unknown time     ondansetron (ZOFRAN-ODT) 4 MG ODT tab Take 1 tablet (4 mg) by mouth every 6 hours as needed for nausea or vomiting 10 tablet 0 Past Week at Unknown time     oxyCODONE (ROXICODONE) 5 MG tablet Take 1 tablet (5 mg) by mouth every 6 hours as needed for moderate to severe pain 20 tablet 0 Past Week at Unknown time     rosuvastatin (CRESTOR) 5 MG tablet Take 1 tablet (5 mg) by mouth daily 30 tablet 3 12/2/2019 at Unknown time     sulfamethoxazole-trimethoprim (BACTRIM/SEPTRA) 400-80 MG tablet Take 1 tablet by mouth daily 30 tablet 11 12/2/2019 at Unknown time     tacrolimus (GENERIC EQUIVALENT) 1 MG capsule Take 2 capsules (2 mg)  by mouth 2 times daily 120 capsule 11 12/2/2019 at Unknown time     tamsulosin (FLOMAX) 0.4 MG capsule TAKE 1 CAPSULE(0.4 MG) BY MOUTH DAILY 90 capsule 3 12/2/2019 at Unknown time     tenofovir (VIREAD) 300 MG tablet Take 1 tablet (300 mg) by mouth daily 30 tablet 11 12/2/2019 at Unknown time     traZODone (DESYREL) 50 MG tablet Take 1 tablet (50 mg) by mouth At Bedtime 90 tablet 1 12/2/2019 at Unknown time     valGANciclovir (VALCYTE) 450 MG tablet Take 1 tablet (450 mg) by mouth daily 30 tablet 5 12/2/2019 at Unknown time     vitamin B-12 (CYANOCOBALAMIN) 1000 MCG tablet Take 1,000 mcg by mouth daily    12/2/2019 at Unknown time     vitamin D3 (CHOLECALCIFEROL) 2000 units (50 mcg) tablet Take 1 tablet by mouth daily   12/2/2019 at Unknown time     BD VIKTORIA U/F 32G X 4 MM insulin pen needle Use 5 per day 300 each 3 Taking     blood glucose (ACCU-CHEK EDINSON PLUS) test strip USE TO TEST FOUR TIMES DAILY OR AS DIRECTED 400 strip 6 Taking     blood glucose monitoring (ACCU-CHEK EDINSON PLUS) test strip Use to test blood sugar 4 times daily 400 each 3 Taking     blood glucose monitoring (ACCU-CHEK FASTCLIX) lancets Use to test blood sugar 4 times daily or as directed.  1 box = 102 lancets 408 each 3 Taking     insulin aspart (NOVOLOG PEN) 100 UNIT/ML pen Inject 1-11 Units Subcutaneous At Bedtime Correction Scale - custom DOSING (1:20)   to 219 give 1 units.    to 239 give 2 units.    to 259 give 3 units.    to 279 give 4 units.    to 299 give 5 units.    to 319 give 6 units.    to 339 give 7 units.    to 359 give 8 units    to 379 give 9 units.   to 399 give 10 units.  BG >/=400 give 11 units.  Notify provider if glucose greater than or equal to 400 mg/dL after administration. If given at mealtime, administer within 30 minutes of start of meal   12/1/2019     order for DME Equipment being ordered: Cane ()  Treatment Diagnosis: impaired gait 1 each 0 Taking           Discharge Medications:     Current Discharge Medication List      START taking these medications    Details   insulin glargine (LANTUS PEN) 100 UNIT/ML pen Inject 14 Units Subcutaneous every morning    Comments: If Lantus is not covered by insurance, may substitute Basaglar at same dose and frequency.    Associated Diagnoses: JERONIMO (acute kidney injury) (H)      insulin isophane human (HUMULIN N PEN) 100 UNIT/ML injection Inject 20 Units Subcutaneous every evening    Associated Diagnoses: JERONIMO (acute kidney injury) (H)      Lidocaine (LIDOCARE) 4 % Patch Place 1 patch onto the skin every 24 hours To prevent lidocaine toxicity, patient should be patch free for 12 hrs daily.    Associated Diagnoses: JERONIMO (acute kidney injury) (H)      magnesium oxide (MAG-OX) 400 MG tablet Take 1 tablet (400 mg) by mouth 2 times daily    Associated Diagnoses: JERONIMO (acute kidney injury) (H)      mirtazapine (REMERON) 15 MG tablet Take 1 tablet (15 mg) by mouth At Bedtime    Associated Diagnoses: JERONIMO (acute kidney injury) (H)      ondansetron (ZOFRAN) 4 MG tablet Take 1 tablet (4 mg) by mouth every 6 hours as needed for nausea or vomiting    Associated Diagnoses: JERONIMO (acute kidney injury) (H)      phosphorus tablet 250 mg (PHOSPHA 250 NEUTRAL) 250 MG per tablet Take 1 tablet (250 mg) by mouth 2 times daily    Associated Diagnoses: JERONIMO (acute kidney injury) (H)      Prenatal Vit-Fe Fumarate-FA (PRENATAL MULTIVITAMIN W/IRON) 27-0.8 MG tablet Take 1 tablet by mouth daily  Qty: 90 tablet, Refills: 3    Associated Diagnoses: JERONIMO (acute kidney injury) (H)      prochlorperazine (COMPAZINE) 5 MG tablet Take 1 tablet (5 mg) by mouth every 6 hours as needed for nausea or vomiting (use after Zofran)    Associated Diagnoses: JERONIMO (acute kidney injury) (H)      simethicone (MYLICON) 80 MG chewable tablet Take 1 tablet (80 mg) by mouth every 6 hours as needed for cramping    Associated Diagnoses: JERONIMO (acute kidney injury) (H)      sodium  bicarbonate 650 MG tablet Take 1 tablet (650 mg) by mouth 2 times daily    Associated Diagnoses: JERONIMO (acute kidney injury) (H)      sodium chloride (OCEAN) 0.65 % nasal spray Spray 1 spray into both nostrils every hour as needed for congestion    Associated Diagnoses: JERONIMO (acute kidney injury) (H)         CONTINUE these medications which have CHANGED    Details   !! insulin aspart (NOVOLOG PEN) 100 UNIT/ML pen Inject 1-7 Units Subcutaneous 3 times daily (before meals)    Associated Diagnoses: JERONIMO (acute kidney injury) (H)      !! insulin aspart (NOVOLOG PEN) 100 UNIT/ML pen Inject 1-7 Units Subcutaneous 3 times daily (with meals) DOSE:  1 units per 10 grams of carbohydrate.  Only chart total amount of units given.  Do not give if pre-prandial glucose is less than 60 mg/dL. If given at mealtime, administer within 30 minutes of start of meal    Associated Diagnoses: JERONIMO (acute kidney injury) (H)      !! insulin aspart (NOVOLOG PEN) 100 UNIT/ML pen Inject 1-7 Units Subcutaneous Take with snacks or supplements for high blood sugar DOSE:  1 units per 10 grams of carbohydrate. Only chart total amount of units given.  Do not give if pre-prandial glucose is less than 60 mg/dL. If given at mealtime, administer within 30 minutes of start of meal    Associated Diagnoses: JERONIMO (acute kidney injury) (H)      !! insulin aspart (NOVOLOG PEN) 100 UNIT/ML pen Inject 1-5 Units Subcutaneous 2 times daily    Associated Diagnoses: JERONIMO (acute kidney injury) (H)      !! insulin aspart (NOVOLOG PEN) 100 UNIT/ML pen Inject 2 Units Subcutaneous every 24 hours    Associated Diagnoses: JERONIMO (acute kidney injury) (H)      tacrolimus (GENERIC EQUIVALENT) 1 MG capsule Take 4 capsules (4 mg) by mouth 2 times daily    Associated Diagnoses: Liver transplant recipient (H)       !! - Potential duplicate medications found. Please discuss with provider.      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)      Artificial Tear Solution (SM ARTIFICIAL TEARS) SOLN Place 1 drop into the right eye every hour as needed Apply at least 4 times daily and as needed for dry eye  Qty: 1 Bottle, Refills: 3    Associated Diagnoses: Dry eyes      aspirin (ASA) 325 MG EC tablet Take 1 tablet (325 mg) by mouth daily  Qty: 30 tablet, Refills: 3    Associated Diagnoses: Liver transplant recipient (H)      carvedilol (COREG) 25 MG tablet Take 0.5 tablets (12.5 mg) by mouth 2 times daily (with meals)  Qty: 60 tablet, Refills: 3    Associated Diagnoses: Liver transplant recipient (H)      econazole nitrate 1 % external cream Apply topically daily To feet and toenails.  Qty: 85 g, Refills: 0    Associated Diagnoses: Tinea pedis of both feet      ferrous sulfate (FEROSUL) 325 (65 Fe) MG tablet Take 1 tablet (325 mg) by mouth 3 times daily (with meals)  Qty: 90 tablet, Refills: 3    Associated Diagnoses: Liver cirrhosis secondary to ESTRADA (H)      glucose (BD GLUCOSE) 4 g chewable tablet Take 1 tablet by mouth every hour as needed for low blood sugar  Qty: 60 tablet, Refills: 1    Associated Diagnoses: Type 2 diabetes mellitus with other specified complication, with long-term current use of insulin (H)      loperamide (IMODIUM) 2 MG capsule Take 1 capsule (2 mg) by mouth 4 times daily as needed for diarrhea  Qty: 20 capsule, Refills: 1    Associated Diagnoses: Liver transplant recipient (H)      Multiple Vitamin (THERAVITE PO) Take 1 tablet by mouth every morning       mycophenolic acid (GENERIC EQUIVALENT) 180 MG EC tablet Take 3 tablets (540 mg) by mouth every 12 hours  Qty: 180 tablet, Refills: 3    Comments: TXP DT 11/11/2019 (Liver) TXP Dischg DT 11/20/2019 DX Liver replaced by transplant Z94.4 [facility] ([place], [place])  Associated  Diagnoses: Status post liver transplantation (H)      omeprazole (PRILOSEC) 40 MG DR capsule Take 1 capsule (40 mg) by mouth daily  Qty: 90 capsule, Refills: 2    Associated Diagnoses: Gastroesophageal reflux disease, esophagitis presence not specified      rosuvastatin (CRESTOR) 5 MG tablet Take 1 tablet (5 mg) by mouth daily  Qty: 30 tablet, Refills: 3    Associated Diagnoses: Hyperlipidemia, unspecified hyperlipidemia type      sulfamethoxazole-trimethoprim (BACTRIM/SEPTRA) 400-80 MG tablet Take 1 tablet by mouth daily  Qty: 30 tablet, Refills: 11    Associated Diagnoses: Liver transplant recipient (H)      tamsulosin (FLOMAX) 0.4 MG capsule TAKE 1 CAPSULE(0.4 MG) BY MOUTH DAILY  Qty: 90 capsule, Refills: 3    Associated Diagnoses: Benign prostatic hyperplasia with lower urinary tract symptoms, symptom details unspecified      tenofovir (VIREAD) 300 MG tablet Take 1 tablet (300 mg) by mouth daily  Qty: 30 tablet, Refills: 11    Associated Diagnoses: Liver transplant recipient (H)      valGANciclovir (VALCYTE) 450 MG tablet Take 1 tablet (450 mg) by mouth daily  Qty: 30 tablet, Refills: 5    Associated Diagnoses: Liver transplant recipient (H)      vitamin B-12 (CYANOCOBALAMIN) 1000 MCG tablet Take 1,000 mcg by mouth daily       vitamin D3 (CHOLECALCIFEROL) 2000 units (50 mcg) tablet Take 1 tablet by mouth daily      BD VIKTORIA U/F 32G X 4 MM insulin pen needle Use 5 per day  Qty: 300 each, Refills: 3    Associated Diagnoses: Type 2 diabetes mellitus without complication, with long-term current use of insulin (H)      !! blood glucose (ACCU-CHEK EDINSON PLUS) test strip USE TO TEST FOUR TIMES DAILY OR AS DIRECTED  Qty: 400 strip, Refills: 6    Associated Diagnoses: Type II diabetes mellitus (H)      !! blood glucose monitoring (ACCU-CHEK EDINSON PLUS) test strip Use to test blood sugar 4 times daily  Qty: 400 each, Refills: 3    Associated Diagnoses: Type II diabetes mellitus (H)      blood glucose monitoring (ACCU-CHEK  FASTCLIX) lancets Use to test blood sugar 4 times daily or as directed.  1 box = 102 lancets  Qty: 408 each, Refills: 3    Associated Diagnoses: Type II diabetes mellitus (H)      order for DME Equipment being ordered: Cane ()  Treatment Diagnosis: impaired gait  Qty: 1 each, Refills: 0    Associated Diagnoses: Liver transplant recipient (H)       !! - Potential duplicate medications found. Please discuss with provider.      STOP taking these medications       insulin degludec (TRESIBA) 200 UNIT/ML pen Comments:   Reason for Stopping:         ondansetron (ZOFRAN-ODT) 4 MG ODT tab Comments:   Reason for Stopping:         oxyCODONE (ROXICODONE) 5 MG tablet Comments:   Reason for Stopping:         traZODone (DESYREL) 50 MG tablet Comments:   Reason for Stopping:                     Consultations:   Consultation during this admission received from endocrinology, gastroenterology and nutrition          Brief History of Illness:   54 year old male with PMHx significant for cirrhosis secondary to ESTRADA diagnosed in 2013, HCC 2018, HTN, HLD, GERD, BPH and DMII. S/p liver transplant 11/11/19 complicated by hematoma evacuation on POD 1. Admitted with JERONIMO, nausea, diarrhea, confusion and weakness.           Hospital Course:   Graft function:   S/p liver transplant: DD OLT 11/11/19. LFTs stable. 12/4 US liver d/t RUQ tenderness revealed multiple small fluid collections around liver; patent vasculature.     Immunosuppression management:   CC: Myfortic 540 mg BID, changed from Cellcept on 12/2 due to nausea.  FK: Tacrolimus 4 mg BID; goal 10-12 (12 hr trough). 12/9 level 6.3, dose increased to 4 mg BID at that time. 12/10 level pending on discharge.     Hematology:   Anemia: Hgb 7.1 on the day of discharge. Transfused 1unit PRBC on 12/4 & 12/5. Hemoccult stool positive, likely due to gastritis. % Retic 1.5. . Haptoglobin 206. PBS normochromic/normocytic.     Cardiorespiratory:   Hypotensive: PTA Coreg discontinued on  admission due to hypotension. Hypotension resolved with hydration and Coreg was restarted.      GI/Nutrition:   Severe malnutrition in the context of acute on chronic illness: Minimal intake outpatient due to confusion, nausea and anorexia. NJ placed for TF initiation. RD consulted. Supplements and flynn counts ordered. TF cycled.   Mild gastropathy: GI consulted due to nausea and diarrhea. Mild gastropathy noted on 12/6 EGD. Continue PPI daily per GI. Pathology pending.     Endocrine:   Type II diabetes mellitus: PTA was on Tresiba, carb coverage and sliding scale insulin. Hypoglycemia at home due to poor PO intake. Sliding scale insulin and carb coverage restarted. Hold Tresiba at this time. Endocrinology consulted and started Lantus 14 units daily and NPH 20 units every evening with tube feeds.     Fluid/Electrolytes:   JERONIMO: Likely r/t decreased PO intake outpatient. Cr 3.2 on admission, decreased to 1.5 on the day of discharge post hydration.   Hyperkalemia: D/t tacro, acidosis, JERONIMO. K 6 on 12/3; 30G kayexalate administered. Resolved.   Hypernatremia: Due to dehydration, resolved.   Low bicarb: Due to JERONIMO and diarrhea. CO2 12, increased to WNL. Initiated bicarb BID.    Infectious disease: AF; WBC WNL. UA unremarkable. CMV 12/2 not detected.   Diarrhea: Worsening diarrhea prior to admission. C. Diff, enteric viral panel, and rotavirus negative. Fecal lactoferrin positive. Diarrhea improving.  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     Neuro:   Confusion: Patient intermittently confused at home. MOCA indicating cognitive impairment.   Anxiety: Health psychology and psych consulted. Remeron initiated at bedtime per psych recommendations. Health psychology to see patient weekly while inpatient.    PE:   General Appearance: in no apparent distress.   Skin: normal, warm, dry, No rashes, induration, or jaundice.  Heart: RRR  Lungs: non-labored breathing on  RA  Abdomen: Incision c/d/i. Abdomen flat. Mildly tender RUQ.  : Carroll is not present.  Extremities: edema: none  Neurologic: alert, oriented x4. Tremor absent.           Discharge Instructions and Follow-Up:   Discharge diet: Regular  Heart healthy dietary habits long term (low saturated/trans fat, low sodium). High protein diet x 8 weeks. Practice food safety precautions.   Discharge activity: Walk at least four times a day, lift no greater than 10 pounds for 6-8 weeks from the time of surgery.  No driving while taking narcotics or 3 weeks after surgery.   Discharge follow-up: Labs at TCU: CBC, BMP, Hepatic panel, Mg, Phos & Tacrolimus level every Monday and Thursday.     After discharge from TCU:  On discharge from TCU will require same day home care services for medication set up.     DO NOT take tacrolimus (Prograf) until after your labs are drawn. Remember to bring all of your medications with you to this appointment.    LABS:  Transplant labs (CBC, BMP, hepatic panel, mag, phos, and  tacrolimus levels (12 hours after administration)) to be drawn every Monday and Thursday for 4 weeks.  FOLLOW UP APPOINTMENTS:  Remember to always bring an updated medication list to all appointments.     Follow up with your transplant surgeon, Dr. Ramos, in 1-2 weeks.  Follow up with transplant hepatology at 1 month.    Follow up with oncology in 1-2 months  Follow up with primary care provider in 2-4 weeks. (Pt to schedule)  You have a biliary stent in place.  Your coordinator will follow up in 6-8 weeks.  Call scheduling at [phone] if you have not heard about your appointments within 48 hours after discharge.  WHEN TO CONTACT YOUR  COORDINATOR:  Transplant Coordinator [phone]  Notify your coordinator if you have abdominal pain, increased redness or drainage from your incision, or fever greater than 100.5F.  Notify your coordinator immediately if you are ever unable to take your immunosuppressive medications  for any reason.  If it is outside of office hours, please call the hospital switchboard at [phone] and ask to have the liver transplant surgery fellow paged for urgent medical questions, or present to the emergency department.    OTHER DISCHARGE INSTRUCTIONS:  Monitor blood glucose levels 4 times a day           Discharge Disposition:   Discharged to rehabilitation facility      Maryanne Recio PA-C

## 2019-12-06 NOTE — PROGRESS NOTES
Gastroenterology Endoscopy Suite Brief Operative Note    Procedure:  Upper endoscopy + Colonoscopy   Post-operative diagnosis: - Gastropathy, mild  - Mild ileal erythema   Staff Physician:  Dr. Morton   Fellow/Assistant(s):  Dr. Amaya   Specimens:  Please see final procedure note for further details.   Findings:  - Gastropathy, mild; otherwise normal esophagus and duodenum  - Mild ileal erythema; normal colon including retroflexion   Complications:  None.   Condition:  Stable   Recommendations  Diet:  Return to previous diet  PPI:  PPI po once daily  Anti-coagulants/platelets:  N/A  Discharge Planning:   - F/u pathology

## 2019-12-06 NOTE — PLAN OF CARE
/74 (BP Location: Right arm)   Pulse 86   Temp (P) 97.6  F (36.4  C)   Resp 20   Wt 77 kg (169 lb 11.2 oz)   SpO2 97%   BMI 25.06 kg/m     Mental status: A/Ox3, c/o feeling tired and sleep deprived  VS: VSS on RA  GI: NPO most of the morning for EGD. TB feed resumed at 1400 after verifying NJ tube placement. BMx1. Nausea/vomit but refused zofran  Pain: Denies  : Voiding without difficulty  PIV infusing 1/2 NS at 75ml/h  Activity - SBA. [place] at bedside  Continue with POC

## 2019-12-06 NOTE — PROGRESS NOTES
Transplant Surgery  Inpatient Daily Progress Note  12/06/2019    Assessment & Plan: Frandy Workman is a 54 year old male with PMHx significant for cirrhosis secondary to ESTRADA diagnosed in 2013, HCC 2018, HTN, HLD, GERD, BPH and DMII. S/p liver transplant 11/11/19 complicated by hematoma evacuation on POD 1. Now admitted with JERONIMO, nausea, diarrhea, confusion and weakness.     Graft function:   S/p liver transplant: DD OLT 11/11/19. LFTs stable. 12/4 US liver d/t RUQ tenderness revealed multiple small fluid collections around liver; patent vasculature.   Immunosuppression management:   CC: Myfortic 540 mg BID, changed from Cellcept on 12/2 due to nausea.  FK: Tacrolimus 2.5 mg BID; goal 10-12 (12 hr trough). Level 12/5 6 (~12hr trough). 12/6 level pending.   Complexity of management:Medium. Contributing factors: organ dysfunction and confusion  Hematology:   Anemia: Hgb 7.4<-6.9<-6.5. Transfused 1unit PRBC on 12/4 & 12/5. Hemoccult stool positive. % Retic 1.5. . Haptoglobin ordered.   Cardiorespiratory:   Hypotensive: PTA Coreg discontinued on admission; will restart. Continue IVF. Resolved.   GI/Nutrition:   Severe malnutrition in the context of acute on chronic illness: Minimal intake outpatient due to confusion, no appetite. NJ placed for TF initiation. RD consulted. Supplements and flynn counts ordered. 12/4 flynn count 196, 12/5 0 kcal. TF now cycled.  Endocrine:   Type II diabetes mellitus: PTA was on Tresiba, carb coverage and sliding scale insulin. Hypoglycemia at home due to poor PO intake. Sliding scale insulin and carb coverage restarted. Hold Tresiba at this time. Endocrinology consulted and started Lantus 14 units daily and PNH every evening with tube feeds.  this AM.   Fluid/Electrolytes:   JERONIMO: Likely r/t decreased PO intake outpatient. Cr 3.2 on admission from 1.9; recheck today 1.9.   Hyperkalemia: D/t tacro, acidosis, JERONIMO. K 6 on 12/3; 30G kayexalate administered. Down to 3.8 today. Was on  low K diet, will advance to regular diet today.  Hyperchloremia: Cl 117->124->119->116. Stopped NS and started 1/2 NS @ 75. Added free water to TF. Oral fluid intake is good.   Hypernatremia: Na 138->146->143->144. Continue free water.  Low bicarb: Due to JERONIMO and diarrhea. CO2 12->15->15->18. Initiated bicarb BID.  : No acute issues  Infectious disease: Tmax yesterday 99.2F; WBC 3.4. UA unremarkable. Today afebrile. CMV 12/2 not detected.   Diarrhea: Worsening diarrhea prior to admission. C. Diff, enteric viral panel, and rotavirus negative. Fecal lactoferrin positive. Diarrhea improving.  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   Neuro:   Confusion: Patient intermittently confused at home. MOCA indicating cognitive impairment. No confusion today.  Anxiety: Health psychology and psych consulted. Remeron initiated at bedtime per psych recommendations. Health psychology to see patient weekly while inpatient.  Prophylaxis: DVT (mechanical), fall, GI (protonix), viral (Valcyte), pneumocystis (Bactrim)  Disposition: 7A; PT/OT evaluating. Medically ready since 12/5, awaiting TCU transfer.     Medical Decision Making: Medium  Subsequent visit 38460 (moderate level decision making)    SERA/Fellow/Resident Provider: Maryanne Recio PA-C     Faculty: Berlin Hernandez M.D.    __________________________________________________________________  Transplant History:    11/11/2019 (Liver), Postoperative day: 25     Interval History: History is obtained from the patient  No acute events overnight. Patient seen lying comfortably in bed. Nausea continues.     ROS:   A 10-point review of systems was negative except as noted above.    Curent Meds:    alpha-lipoic acid  600 mg Oral Daily     aspirin  325 mg Oral Daily     carvedilol  12.5 mg Oral BID w/meals     cyanocobalamin  1,000 mcg Oral Daily     ferrous sulfate  325 mg Oral TID w/meals     insulin aspart  1-5 Units  Subcutaneous BID     insulin aspart  1-7 Units Subcutaneous TID AC     insulin aspart   Subcutaneous TID w/meals     insulin glargine  14 Units Subcutaneous QAM     insulin isophane human  15 Units Subcutaneous QPM     lidocaine   Transdermal Q8H     magnesium oxide  400 mg Oral BID     mirtazapine  15 mg Oral At Bedtime     mycophenolic acid  540 mg Oral Q12H     omeprazole  40 mg Oral Daily     phosphorus tablet 250 mg  250 mg Oral BID     prenatal multivitamin w/iron  1 tablet Oral Daily     rosuvastatin  5 mg Oral Daily     sodium bicarbonate  650 mg Oral BID     sodium chloride (PF)  3 mL Intracatheter Q8H     sulfamethoxazole-trimethoprim  1 tablet Oral Daily     tacrolimus  2.5 mg Oral BID     tamsulosin  0.4 mg Oral Daily     tenofovir  300 mg Oral Q48H     valGANciclovir  450 mg Oral Daily     vitamin D3  2,000 Units Oral Daily       Physical Exam:     Admit Weight: 73.8 kg (162 lb 11.2 oz)    Current Vitals:   /60   Pulse 86   Temp 97.7  F (36.5  C) (Oral)   Resp 21   Wt 77 kg (169 lb 11.2 oz)   SpO2 97%   BMI 25.06 kg/m      Vital sign ranges:    Temp:  [97.7  F (36.5  C)-99.2  F (37.3  C)] 97.7  F (36.5  C)  Pulse:  [81-94] 86  Heart Rate:  [80-96] 88  Resp:  [14-34] 21  BP: (110-147)/(53-84) 126/60  Arterial Line BP: (133)/(83) 133/83  SpO2:  [96 %-100 %] 97 %    General Appearance: in no apparent distress.   Skin: normal, warm, dry, No rashes, induration, or jaundice. Pallor noted.  Heart: well-perfused  Lungs: non-labored breathing on RA  Abdomen: Incision c/d/i. Abdomen flat. Mildly tender RUQ.  : Carroll is not present.  Extremities: edema: none  Neurologic: alert, oriented x4. Tremor absent.     Data:   CMP  Recent Labs   Lab 12/06/19  0552 12/05/19  0605  12/04/19  0446    143   < > 146*   POTASSIUM 3.8 4.7  --  4.8   CHLORIDE 116* 119*  --  124*   CO2 18* 15*  --  15*   * 226*  --  181*   BUN 33* 49*  --  61*   CR 1.87* 2.10*  --  2.46*   GFRESTIMATED 39* 34*  --  28*    GFRESTBLACK 46* 40*  --  33*   DEBORAH 8.4* 8.5  --  8.4*   MAG 1.3* 1.6  --  1.9   PHOS 1.8*  --   --  3.6   LIPASE  --  70*  --   --    ALBUMIN 2.3* 2.3*  --  2.4*   BILITOTAL 0.3 0.6  --  0.4   ALKPHOS 130 164*  --  160*   AST 13 15  --  14   ALT 19 21  --  18    < > = values in this interval not displayed.     CBC  Recent Labs   Lab 12/06/19  0552 12/05/19  0605   HGB 7.4* 6.9*   WBC 3.4* 3.5*   * 117*     Attestation:    The patient has been seen and evaluated by me.   Vital signs, labs, medications and orders were reviewed.   When obtained, diagnostic images were reviewed by me and interpreted as above.    The care plan was discussed with the multidisciplinary team and I agree with the findings and plan in this note, with any differences recorded in blue.    Immunosuppressive medication management was reviewed and adjusted as reflected in the note and orders.     .

## 2019-12-06 NOTE — PROGRESS NOTES
Calorie Count    Intake recorded for: 12/5/19  Total Kcals: 0 Total Protein: 0g    Kcals from Hospital Food: 0   Protein: 0g    Kcals from Outside Food (average):0 Protein: 0g    # Meals Recorded: 1 meal ordered from kitchen. 0% intake documented for meal.     # Supplements Recorded: no intake recorded

## 2019-12-06 NOTE — PLAN OF CARE
[id number]  /75 (BP Location: Right arm)   Pulse 91   Temp 97.7  F (36.5  C) (Oral)   Resp 16   Wt 77 kg (169 lb 11.2 oz)   SpO2 99%   BMI 25.06 kg/m       Pt was slightly hypertensive earlier on this shift 143/76, but was vitally stable with 0400 vitals on RA. Pt continues to have R flank pain, pt only using heat pad at this time, declined medication intervention. Zofran SL given x 1 this shift. Pt had 2L of Golytely overnight and was able to finish the other 1.5L that he needed to finish from evening shift around 0500 this morning, BM clear and yellow in color. BG ACHS, 110 @ 0200. Hgb 7.4 today. NPO for endoscopy/ colonoscopy. L PIV running 1/2 NS @ 75 ml/hr. NJ clamped. Clamshell incision approximated with sterile-strips. A-1 with gait belt. PT/OT to work with patient this morning. Will continue to monitor and follow plan of care.

## 2019-12-06 NOTE — PLAN OF CARE
"  BP (!) 141/84 (BP Location: Right arm)   Pulse 91   Temp 97.8  F (36.6  C) (Oral)   Resp 16   Wt 75.8 kg (167 lb)   SpO2 100%   BMI 24.66 kg/m      [id number]  VSS on RA, no s/s of distress. A/Ox4, pleasant and cooperative with most cares; pt only grew somewhat agitated later in shift d/t golytely prep--\"how am I going to finish all that!?\". Endorsed right side/flank pain--using heat pad with adequate relief of pain. Pt endorsed intermittent nausea thru shift; given scheduled zofran per orders. No emesis. Plan for pt to have endoscopy and colonoscopy sometime tomorrow morning per GI, NPO since 1630 (TF/longacting insulin held per MD orders). Pt started on golytely prep around 1800 and pt struggling with prep d/t nausea. Pt needing much encouragement. Ying TRIANA paged--ordered for some prep to be administered via NJ at pt's TF rate; some golytely prep currently at 60ml/hr via NJ (started around 2100); pt has finished about 2L thus far (mixed with gatorade or applejuice) and still needs to complete the rest of prep. Another 2L of prep ordered for 3am; will make night RN aware. Pt has had 3-4 liquid brown BMs this shift--samples sent to [facility] results pending. PIV patent with 1/2 NS @75ml/hr. AC/HS BG, 110s, no sliding scale needed. Plan to discharge to rehab once GI work up is completed. Pt watching television most of shift and taking intermittent naps between cares; pt resting quietly now, call light and belongings within reach. Will continue to monitor and continue POC/notify team as needed.   " I have reviewed and confirmed nurses' notes...

## 2019-12-06 NOTE — PROGRESS NOTES
Post Transplant Patient Social Work Assessment     Patient Name: Frandy Workman  : 1965  Age: 54 year old  MRN: [id number]  Date of transplant: 2019    Patient followed by SANTOS Mcnair in the liver transplant program.  Admitted on  for JERONIMO, nausea, diarrhea, confusion and weakness. .  Saw today to update assessment and facilitate discharge plan. Patient was admitted from home.  Short term rehab stay now needed for continued tube feedings and PT/OT. Patient prefers placement at [facility] due to recent transplant and is agreeable to placement. [facility].  Patient will be 4th on their wait list to admit tomorrow.      Presenting Information   Living Situation: [place] [facility] [place] [facility] [place] [facility]   If not local, plans for short term stay:  N/A  Previous Functional Status: per PT, decreased strength, impaired balance, activity intolerance  Cultural/Language/Spiritual Considerations: Jehovah's witness, English     Support System  Primary Support Person: Davi Saunders  Other support: friends for emotional support  Plan for support in immediate post-transplant period: initially care for by Davi estrada and then had 24 hour care private pay.  Patient can resume this care at discharge from ARU if needed.       Health Care Directive  Decision Maker: patient  Alternate Decision Maker: Davi Saunders Jr. (friend) is named as the primary agent and Alfredo Rubio (friend) is named as the alternate agent.  Health Care Directive: Copy in Chart     Mental Health/Coping:   History of Mental Health: Trazadon for sleep, no other mental health history  History of Chemical Health: no significant history, no alcohol since 1996, no history of illicit drug use or pain medication abuse  Current status: stable  Coping: appropriate to situation  Services Needed/Recommended: n/a     Financial   Income: long-term disability through his former employer and SSDI  Impact of  transplant on income: increased health care costs  Insurance and medication coverage: Medicare and AARP, immunosuppression medications will be billed through Medicare Part B  Financial concerns: none  Resources needed: none    Discharge Plan   Patient and family discharge goal: ARU for continued tube feedings, and PT/OT  Barriers to discharge: medical stability.  Tube feedings resumed today.  Will be ready tomorrow.      Education provided by SW: Social Work role inpatient setting, availability of support groups, parking information, options for discharge.      Assessment and recommendations and plan: patient ready for TCU tomorrow.  Have asked weekend  to check bed availability. PAS complete [id number]. Please page SERA, [place], [phone] if patient can discharge.

## 2019-12-07 LAB
ALBUMIN SERPL-MCNC: 2.3 G/DL (ref 3.4–5)
ALP SERPL-CCNC: 145 U/L (ref 40–150)
ALT SERPL W P-5'-P-CCNC: 22 U/L (ref 0–70)
ANION GAP SERPL CALCULATED.3IONS-SCNC: 7 MMOL/L (ref 3–14)
AST SERPL W P-5'-P-CCNC: 18 U/L (ref 0–45)
BILIRUB DIRECT SERPL-MCNC: 0.1 MG/DL (ref 0–0.2)
BILIRUB SERPL-MCNC: 0.4 MG/DL (ref 0.2–1.3)
BUN SERPL-MCNC: 31 MG/DL (ref 7–30)
CALCIUM SERPL-MCNC: 8.3 MG/DL (ref 8.5–10.1)
CHLORIDE SERPL-SCNC: 116 MMOL/L (ref 94–109)
CO2 SERPL-SCNC: 20 MMOL/L (ref 20–32)
CREAT SERPL-MCNC: 1.68 MG/DL (ref 0.66–1.25)
ERYTHROCYTE [DISTWIDTH] IN BLOOD BY AUTOMATED COUNT: 17.7 % (ref 10–15)
GFR SERPL CREATININE-BSD FRML MDRD: 45 ML/MIN/{1.73_M2}
GLUCOSE BLDC GLUCOMTR-MCNC: 128 MG/DL (ref 70–99)
GLUCOSE BLDC GLUCOMTR-MCNC: 145 MG/DL (ref 70–99)
GLUCOSE BLDC GLUCOMTR-MCNC: 165 MG/DL (ref 70–99)
GLUCOSE BLDC GLUCOMTR-MCNC: 181 MG/DL (ref 70–99)
GLUCOSE BLDC GLUCOMTR-MCNC: 87 MG/DL (ref 70–99)
GLUCOSE SERPL-MCNC: 203 MG/DL (ref 70–99)
HCT VFR BLD AUTO: 22.7 % (ref 40–53)
HGB BLD-MCNC: 7.4 G/DL (ref 13.3–17.7)
MAGNESIUM SERPL-MCNC: 1.3 MG/DL (ref 1.6–2.3)
MCH RBC QN AUTO: 30.3 PG (ref 26.5–33)
MCHC RBC AUTO-ENTMCNC: 32.6 G/DL (ref 31.5–36.5)
MCV RBC AUTO: 93 FL (ref 78–100)
MICROSPORID STL TRI STN: NORMAL
MICROSPORID STL TRI STN: NORMAL
PHOSPHATE SERPL-MCNC: 2 MG/DL (ref 2.5–4.5)
PLATELET # BLD AUTO: 99 10E9/L (ref 150–450)
POTASSIUM SERPL-SCNC: 4.2 MMOL/L (ref 3.4–5.3)
PROT SERPL-MCNC: 5.8 G/DL (ref 6.8–8.8)
RBC # BLD AUTO: 2.44 10E12/L (ref 4.4–5.9)
SODIUM SERPL-SCNC: 142 MMOL/L (ref 133–144)
SPECIMEN SOURCE: NORMAL
TACROLIMUS BLD-MCNC: 7.9 UG/L (ref 5–15)
TME LAST DOSE: NORMAL H
WBC # BLD AUTO: 3.7 10E9/L (ref 4–11)

## 2019-12-07 PROCEDURE — 25800030 ZZH RX IP 258 OP 636: Performed by: SURGERY

## 2019-12-07 PROCEDURE — 25000132 ZZH RX MED GY IP 250 OP 250 PS 637: Mod: GY | Performed by: PHYSICIAN ASSISTANT

## 2019-12-07 PROCEDURE — 80076 HEPATIC FUNCTION PANEL: CPT | Performed by: PHYSICIAN ASSISTANT

## 2019-12-07 PROCEDURE — 85027 COMPLETE CBC AUTOMATED: CPT | Performed by: PHYSICIAN ASSISTANT

## 2019-12-07 PROCEDURE — 36415 COLL VENOUS BLD VENIPUNCTURE: CPT | Performed by: PHYSICIAN ASSISTANT

## 2019-12-07 PROCEDURE — 84100 ASSAY OF PHOSPHORUS: CPT | Performed by: PHYSICIAN ASSISTANT

## 2019-12-07 PROCEDURE — 25000132 ZZH RX MED GY IP 250 OP 250 PS 637: Mod: GY | Performed by: NURSE PRACTITIONER

## 2019-12-07 PROCEDURE — 00000146 ZZHCL STATISTIC GLUCOSE BY METER IP

## 2019-12-07 PROCEDURE — 25000132 ZZH RX MED GY IP 250 OP 250 PS 637: Mod: GY | Performed by: TRANSPLANT SURGERY

## 2019-12-07 PROCEDURE — 25000128 H RX IP 250 OP 636: Performed by: PHYSICIAN ASSISTANT

## 2019-12-07 PROCEDURE — 25800029 ZZH RX IP 258 OP 250: Performed by: NURSE PRACTITIONER

## 2019-12-07 PROCEDURE — 25000131 ZZH RX MED GY IP 250 OP 636 PS 637: Mod: GY | Performed by: NURSE PRACTITIONER

## 2019-12-07 PROCEDURE — 12000026 ZZH R&B TRANSPLANT

## 2019-12-07 PROCEDURE — 25000131 ZZH RX MED GY IP 250 OP 636 PS 637: Mod: GY | Performed by: PHYSICIAN ASSISTANT

## 2019-12-07 PROCEDURE — 80048 BASIC METABOLIC PNL TOTAL CA: CPT | Performed by: PHYSICIAN ASSISTANT

## 2019-12-07 PROCEDURE — 27210432 ZZH NUTRITION PRODUCT RENAL BASIC LITER

## 2019-12-07 PROCEDURE — 25000131 ZZH RX MED GY IP 250 OP 636 PS 637: Mod: GY | Performed by: SURGERY

## 2019-12-07 PROCEDURE — 80197 ASSAY OF TACROLIMUS: CPT | Performed by: NURSE PRACTITIONER

## 2019-12-07 PROCEDURE — 25000125 ZZHC RX 250: Performed by: SURGERY

## 2019-12-07 PROCEDURE — 83735 ASSAY OF MAGNESIUM: CPT | Performed by: PHYSICIAN ASSISTANT

## 2019-12-07 RX ORDER — TENOFOVIR DISOPROXIL FUMARATE 300 MG/1
300 TABLET, FILM COATED ORAL DAILY
Status: DISCONTINUED | OUTPATIENT
Start: 2019-12-08 | End: 2019-12-10 | Stop reason: HOSPADM

## 2019-12-07 RX ORDER — TACROLIMUS 1 MG/1
3 CAPSULE ORAL 2 TIMES DAILY
Status: DISCONTINUED | OUTPATIENT
Start: 2019-12-07 | End: 2019-12-08

## 2019-12-07 RX ADMIN — ROSUVASTATIN CALCIUM 5 MG: 5 TABLET, FILM COATED ORAL at 20:42

## 2019-12-07 RX ADMIN — ONDANSETRON 4 MG: 4 TABLET, ORALLY DISINTEGRATING ORAL at 18:31

## 2019-12-07 RX ADMIN — MYCOPHENOLIC ACID 540 MG: 360 TABLET, DELAYED RELEASE ORAL at 20:43

## 2019-12-07 RX ADMIN — MIRTAZAPINE 15 MG: 15 TABLET, FILM COATED ORAL at 22:25

## 2019-12-07 RX ADMIN — CYANOCOBALAMIN TAB 1000 MCG 1000 MCG: 1000 TAB at 17:49

## 2019-12-07 RX ADMIN — INSULIN ASPART 1 UNITS: 100 INJECTION, SOLUTION INTRAVENOUS; SUBCUTANEOUS at 12:50

## 2019-12-07 RX ADMIN — Medication 600 MG: at 17:49

## 2019-12-07 RX ADMIN — SODIUM CHLORIDE: 4.5 INJECTION, SOLUTION INTRAVENOUS at 04:40

## 2019-12-07 RX ADMIN — FERROUS SULFATE TAB 325 MG (65 MG ELEMENTAL FE) 325 MG: 325 (65 FE) TAB at 07:48

## 2019-12-07 RX ADMIN — OMEPRAZOLE 40 MG: 20 CAPSULE, DELAYED RELEASE ORAL at 07:50

## 2019-12-07 RX ADMIN — CARVEDILOL 12.5 MG: 12.5 TABLET, FILM COATED ORAL at 07:48

## 2019-12-07 RX ADMIN — PRENATAL VIT W/ FE FUMARATE-FA TAB 27-0.8 MG 1 TABLET: 27-0.8 TAB at 07:59

## 2019-12-07 RX ADMIN — Medication 2 G: at 09:51

## 2019-12-07 RX ADMIN — SULFAMETHOXAZOLE AND TRIMETHOPRIM 1 TABLET: 400; 80 TABLET ORAL at 07:51

## 2019-12-07 RX ADMIN — MELATONIN 2000 UNITS: at 17:49

## 2019-12-07 RX ADMIN — DIBASIC SODIUM PHOSPHATE, MONOBASIC POTASSIUM PHOSPHATE AND MONOBASIC SODIUM PHOSPHATE 250 MG: 852; 155; 130 TABLET ORAL at 07:50

## 2019-12-07 RX ADMIN — VALGANCICLOVIR 450 MG: 450 TABLET, FILM COATED ORAL at 07:51

## 2019-12-07 RX ADMIN — MYCOPHENOLIC ACID 540 MG: 360 TABLET, DELAYED RELEASE ORAL at 07:49

## 2019-12-07 RX ADMIN — SODIUM BICARBONATE 650 MG TABLET 650 MG: at 07:50

## 2019-12-07 RX ADMIN — FERROUS SULFATE TAB 325 MG (65 MG ELEMENTAL FE) 325 MG: 325 (65 FE) TAB at 17:49

## 2019-12-07 RX ADMIN — SODIUM PHOSPHATE, MONOBASIC, MONOHYDRATE AND SODIUM PHOSPHATE, DIBASIC, ANHYDROUS 20 MMOL: 276; 142 INJECTION, SOLUTION INTRAVENOUS at 12:05

## 2019-12-07 RX ADMIN — INSULIN ASPART 1 UNITS: 100 INJECTION, SOLUTION INTRAVENOUS; SUBCUTANEOUS at 07:43

## 2019-12-07 RX ADMIN — ASPIRIN 325 MG: 325 TABLET, DELAYED RELEASE ORAL at 07:47

## 2019-12-07 RX ADMIN — CARVEDILOL 12.5 MG: 12.5 TABLET, FILM COATED ORAL at 17:49

## 2019-12-07 RX ADMIN — TAMSULOSIN HYDROCHLORIDE 0.4 MG: 0.4 CAPSULE ORAL at 20:42

## 2019-12-07 RX ADMIN — Medication 400 MG: at 12:13

## 2019-12-07 RX ADMIN — Medication 400 MG: at 20:43

## 2019-12-07 RX ADMIN — TACROLIMUS 3 MG: 1 CAPSULE ORAL at 17:48

## 2019-12-07 RX ADMIN — TACROLIMUS 2.5 MG: 1 CAPSULE ORAL at 07:51

## 2019-12-07 RX ADMIN — DIBASIC SODIUM PHOSPHATE, MONOBASIC POTASSIUM PHOSPHATE AND MONOBASIC SODIUM PHOSPHATE 250 MG: 852; 155; 130 TABLET ORAL at 20:42

## 2019-12-07 RX ADMIN — SODIUM CHLORIDE: 4.5 INJECTION, SOLUTION INTRAVENOUS at 23:19

## 2019-12-07 RX ADMIN — TENOFOVIR DISOPROXIL FUMARATE 300 MG: 300 TABLET ORAL at 07:59

## 2019-12-07 RX ADMIN — FERROUS SULFATE TAB 325 MG (65 MG ELEMENTAL FE) 325 MG: 325 (65 FE) TAB at 12:13

## 2019-12-07 ASSESSMENT — ACTIVITIES OF DAILY LIVING (ADL)
ADLS_ACUITY_SCORE: 16

## 2019-12-07 NOTE — PROGRESS NOTES
GASTROENTEROLOGY PROGRESS NOTE          ASSESSMENT AND RECOMMENDATIONS:   Assessment:  Frandy Workman is a 54 year old male with a PMHx of ESTRADA cirrhosis s/p OLT (11/11/2019) c/b hematoma evacuation (CMV + donor, CMV - recipient, donor had HBV+ thus on tenofovir),  HCC (s/p TACE and microwave ablation 01/2019 prior to OLT), type 2 diabetes, CAD, and GERD, BPH, who presented with acute renal failure, confusion, and elevated tacrolimus level.      #. Nausea, resolved  - Recommend further workup of fluid collection per transplant team, given concern that this collection may be contributing to the patient's RUQ and narcotic use.   - Electrolyte management per primary team  - Management of JERONIMO and tacrolimus dosing per transplant team  - Continue daily calorie counts per nutrition     #. Loose stools, non-bloody, afebrile:   Patient with 1-2 loose stools prior to admission. Infectious workup negative thus far (enteric pathogen panel, c diff, adenovirus, DMV DNA, microsporidia, giardia, cryptosporidium, and O&P). The patient is on several new medications - common medications associated with post-transplant diarrhea including cellcept, tacrolimus, tenofovir, PPIs and degludec. Suspect etiology of loose stools is multifactorial including medication induced and tube feeds.   - Recommend reducing all un-necessary medications, including PPI  - Continue supportive care, including fluid replacement, per primary team  - Please monitor stool output closely - document color, form and amount per shift    #. Anemia, normocytic  Patient with Hgb of 9.6 on discharge 11/27, now with Hgb 7.6 on admission. MCV 97 on admission. Patient on bactrim, which can contribute to macrocytosis. Based on retic index, patient is hypoproliferative in the recent setting of liver transplant and immunosuppression. Has required 1u pRBC on 12/4 and 1u PRBC on 12/5. Rectal exam without melena or hematochezia. No evidence of hemolysis on labs or smear.   Per patient, no other signs of overt bleeding      Thank you for involving us in this patient's care. Please do not hesitate to contact the GI service with any questions or concerns.     Pt care plan discussed with Dr. Morton, GI staff physician.    Maximo Tucker,   Internal Medicine, PGY-2  Pager [phone]          Interval History:     Feels like he is continuing to improve. Does continue to have multiple loose bowel movements and reports 4 non-bloody bowel movements yesterday. Denies any abdominal pain, fever, chills, or shortness of breath.     4 point ROS performed and negative unless noted above          Medications:     Current Facility-Administered Medications   Medication     0.9% sodium chloride BOLUS     alpha-lipoic acid capsule 600 mg     aspirin (ASA) EC tablet 325 mg     carvedilol (COREG) tablet 12.5 mg     cyanocobalamin (VITAMIN B-12) tablet 1,000 mcg     dextrose 10 % 1,000 mL infusion     dextrose 10% infusion     glucose gel 15-30 g    Or     dextrose 50 % injection 25-50 mL    Or     glucagon injection 1 mg     ferrous sulfate (FEROSUL) tablet 325 mg     hypromellose-dextran (ARTIFICAL TEARS) 0.1-0.3 % ophthalmic solution 1 drop     insulin 1 unit/mL in saline (NovoLIN, HumuLIN Regular) drip - ADULT IV Infusion     insulin aspart (NovoLOG) inj (RAPID ACTING)     insulin aspart (NovoLOG) inj (RAPID ACTING)     insulin aspart (NovoLOG) inj (RAPID ACTING)     insulin aspart (NovoLOG) inj (RAPID ACTING)     insulin glargine (LANTUS PEN) injection 14 Units     insulin isophane human (HumuLIN N PEN) injection 15 Units     Lidocaine (LIDOCARE) 4 % Patch 1 patch     lidocaine (LMX4) cream     lidocaine 1 % 1 mL     lidocaine patch in PLACE     lidocaine patch REMOVAL     magnesium oxide (MAG-OX) tablet 400 mg     mirtazapine (REMERON) tablet 15 mg     mycophenolic acid (GENERIC EQUIVALENT) EC tablet 540 mg     naloxone (NARCAN) injection 0.1-0.4 mg     omeprazole (priLOSEC) CR capsule 40 mg      ondansetron (ZOFRAN) tablet 4 mg     ondansetron (ZOFRAN-ODT) ODT tab 4 mg     phosphorus tablet 250 mg (PHOSPHA 250 NEUTRAL) per tablet 250 mg     prenatal multivitamin w/iron per tablet 1 tablet     prochlorperazine (COMPAZINE) tablet 5 mg     rosuvastatin (CRESTOR) tablet 5 mg     simethicone (MYLICON) chewable tablet 80 mg     sodium bicarbonate tablet 650 mg     sodium chloride (PF) 0.9% PF flush 3 mL     sodium chloride (PF) 0.9% PF flush 3 mL     sodium chloride 0.45% infusion     sulfamethoxazole-trimethoprim (BACTRIM/SEPTRA) 400-80 MG per tablet 1 tablet     tacrolimus (GENERIC EQUIVALENT) capsule 2.5 mg     tamsulosin (FLOMAX) capsule 0.4 mg     tenofovir (VIREAD) tablet 300 mg     traMADol (ULTRAM) half-tab 25 mg     valGANciclovir (VALCYTE) tablet 450 mg     Vitamin D3 (CHOLECALCIFEROL) 25 mcg (1000 units) tablet 2,000 Units        Physical Exam   Blood pressure (!) 147/80, pulse 86, temperature 98.2  F (36.8  C), resp. rate 18, weight 76.4 kg (168 lb 8 oz), SpO2 97 %.  Constitutional: cooperative, pleasant, not dyspneic/diaphoretic, no acute distress  Eyes: Sclera anicteric/injected  Ears/nose/mouth/throat: Normal oropharynx without ulcers or exudate, mucus membranes moist  CV: No edema  Respiratory: Unlabored breathing  Abd: Nondistended, +bs, no hepatosplenomegaly, nontender, no peritoneal signs  Skin: warm, perfused, no jaundice  Neuro: AAO x 3  Psych: Normal affect  MSK: No gross deformities    Data   Current Labs  CBC  Recent Labs   Lab 12/07/19  0555 12/06/19  0552 12/05/19  0605 12/04/19  0625 12/04/19  0446   WBC 3.7* 3.4* 3.5*  --  3.4*   RBC 2.44* 2.40* 2.23*  --  1.91*   HGB 7.4* 7.4* 6.9* 6.5* 6.0*   HCT 22.7* 22.4* 21.3*  --  19.1*   MCV 93 93 96  --  100   MCH 30.3 30.8 30.9  --  31.4   MCHC 32.6 33.0 32.4  --  31.4*   RDW 17.7* 17.9* 19.0*  --  20.7*   PLT 99* 110* 117*  --  132*     BMP  Recent Labs   Lab 12/07/19  0555 12/06/19  0552 12/05/19  0605 12/04/19  1342 12/04/19  8712   12/03/19  0536    144 143 144 146*  --  138   POTASSIUM 4.2 3.8 4.7  --  4.8   < > 6.0*   CHLORIDE 116* 116* 119*  --  124*  --  117*   CO2 20 18* 15*  --  15*  --  12*   ANIONGAP 7 11 8  --  7  --  9   * 134* 226*  --  181*  --  173*   BUN 31* 33* 49*  --  61*  --  75*   CR 1.68* 1.87* 2.10*  --  2.46*  --  2.94*   GFRESTIMATED 45* 39* 34*  --  28*  --  23*   GFRESTBLACK 52* 46* 40*  --  33*  --  26*   DEBORAH 8.3* 8.4* 8.5  --  8.4*  --  8.9   MAG 1.3* 1.3* 1.6  --  1.9  --  2.1   PHOS 2.0* 1.8*  --   --  3.6  --  4.1    < > = values in this interval not displayed.      INR  Recent Labs   Lab 12/05/19  0824   INR 1.14     Liver panel  Recent Labs   Lab 12/07/19  0555 12/06/19  0552 12/05/19  0605 12/04/19  0446   PROTTOTAL 5.8* 5.8* 5.9* 6.2*   ALBUMIN 2.3* 2.3* 2.3* 2.4*   BILITOTAL 0.4 0.3 0.6 0.4   ALKPHOS 145 130 164* 160*   AST 18 13 15 14   ALT 22 19 21 18

## 2019-12-07 NOTE — PLAN OF CARE
Slightly hypertensive, OVSS on RA.  Patient c/o left lower back pain but declined any medication- heating pad applied with some relief.   and 165- covered with sliding scale and carb coverage.  Patient also receiving long acting insulin.  Patient with fair appetite- Cycled TF from 9160-8900.  Magnesium 1.3 and Phos 2.9-replaced as ordered.  Lab book and med card updated.  Up with SBA.  Patient had small loose stool x 1.        Will continue to monitor and will notify MD of any changes or concerns.  Plan is to discharge to TCU when able.

## 2019-12-07 NOTE — PROGRESS NOTES
Transplant Surgery  Inpatient Daily Progress Note  12/07/2019    Assessment & Plan: Frandy Workman is a 54 year old male with PMHx significant for cirrhosis secondary to ESTRADA diagnosed in 2013, HCC 2018, HTN, HLD, GERD, BPH and DMII. S/p liver transplant 11/11/19 complicated by hematoma evacuation on POD 1. Now admitted with JERONIMO, nausea, diarrhea, confusion and weakness.     Graft function:   S/p liver transplant: DD OLT 11/11/19. LFTs stable. 12/4 US liver d/t RUQ tenderness revealed multiple small fluid collections around liver; patent vasculature.   Immunosuppression management:   CC: Myfortic 540 mg BID, changed from Cellcept on 12/2 due to nausea.  FK: Tacrolimus 2.5 mg BID; goal 10-12 (12 hr trough). Level 12/5 6 (~12hr trough). 12/6 level was 7.9 with 12 hour through, increase prograft to 3 mg BID  Complexity of management:Medium. Contributing factors: organ dysfunction and confusion  Hematology:   Anemia: Hgb 7.4<-7.4<-6.9<-6.5. Transfused 1unit PRBC on 12/4 & 12/5. Hemoccult stool positive. % Retic 1.5. . EGD and Colonoscopy revealed gastritis and ileitis.   Cardiorespiratory:   Hypotensive: PTA Coreg discontinued on admission; will restart. Continue IVF. Resolved.   GI/Nutrition:   Severe malnutrition in the context of acute on chronic illness: Minimal intake outpatient due to confusion, no appetite. NJ placed for TF initiation. RD consulted. Supplements and flynn counts ordered. 12/4 flynn count 196, 12/5 0 kcal. TF now cycled.  Endocrine:   Type II diabetes mellitus: PTA was on Tresiba, carb coverage and sliding scale insulin. Hypoglycemia at home due to poor PO intake. Sliding scale insulin and carb coverage restarted. Hold Tresiba at this time. Endocrinology consulted and started Lantus 14 units daily and PNH every evening with tube feeds.  this AM.   Fluid/Electrolytes:   JERONIMO: Likely r/t decreased PO intake outpatient. Cr 3.2 on admission from 1.9; creatinine 1.68 today  Hyperkalemia: D/t  tacro, acidosis, JERONIMO. K 6 on 12/3; 30G kayexalate administered. Down to 4.2 today. Was on low K diet, will advance to regular diet today.  Hyperchloremia: Cl 117->124->119->116. Stopped NS and started 1/2 NS @ 75. Added free water to TF. Oral fluid intake is good.   Hypernatremia: Na 138->146->143->144->142. Continue free water.  Low bicarb: Due to JERONIMO and diarrhea. CO2 12->15->15->18->20. Initiated bicarb BID.  Replete magnesium and phosphorus today  : No acute issues  Infectious disease: Tmax yesterday 98.9F; WBC 3.7. UA unremarkable. Today afebrile. CMV 12/2 not detected.   Diarrhea: Worsening diarrhea prior to admission. C. Diff, enteric viral panel, and rotavirus negative. Fecal lactoferrin positive. Diarrhea improving.  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   Neuro:   Confusion: Patient intermittently confused at home. MOCA indicating cognitive impairment. No confusion today.  Anxiety: Health psychology and psych consulted. Remeron initiated at bedtime per psych recommendations. Health psychology to see patient weekly while inpatient.  Prophylaxis: DVT (mechanical), fall, GI (protonix), viral (Valcyte), pneumocystis (Bactrim)  Disposition: 7A; PT/OT evaluating. Medically ready since 12/5, awaiting TCU transfer.     Medical Decision Making: Medium  Subsequent visit 43433 (moderate level decision making)    SERA/Fellow/Resident Provider: Gonzalo Rodriguez MD    Faculty: Berlin Hernandez M.D.    __________________________________________________________________  Transplant History:    11/11/2019 (Liver), Postoperative day: 26     Interval History: History is obtained from the patient  No acute events overnight. Patient seen lying comfortably in bed. Denies nausea. Feels better today    ROS:   A 10-point review of systems was negative except as noted above.    Curent Meds:    alpha-lipoic acid  600 mg Oral Daily     aspirin  325 mg Oral Daily      carvedilol  12.5 mg Oral BID w/meals     cyanocobalamin  1,000 mcg Oral Daily     ferrous sulfate  325 mg Oral TID w/meals     insulin aspart  1-5 Units Subcutaneous BID     insulin aspart  1-7 Units Subcutaneous TID AC     insulin aspart   Subcutaneous TID w/meals     insulin glargine  14 Units Subcutaneous QAM     insulin isophane human  15 Units Subcutaneous QPM     lidocaine   Transdermal Q8H     magnesium oxide  400 mg Oral BID     mirtazapine  15 mg Oral At Bedtime     mycophenolic acid  540 mg Oral Q12H     omeprazole  40 mg Oral Daily     phosphorus tablet 250 mg  250 mg Oral BID     prenatal multivitamin w/iron  1 tablet Oral Daily     rosuvastatin  5 mg Oral Daily     sodium bicarbonate  650 mg Oral BID     sodium chloride (PF)  3 mL Intracatheter Q8H     sodium phosphate  20 mmol Intravenous Once     sulfamethoxazole-trimethoprim  1 tablet Oral Daily     tacrolimus  3 mg Oral BID     tamsulosin  0.4 mg Oral Daily     tenofovir  300 mg Oral Q48H     valGANciclovir  450 mg Oral Daily     vitamin D3  2,000 Units Oral Daily       Physical Exam:     Admit Weight: 73.8 kg (162 lb 11.2 oz)    Current Vitals:   BP (!) 147/80   Pulse 86   Temp 98.2  F (36.8  C)   Resp 18   Wt 76.4 kg (168 lb 8 oz)   SpO2 97%   BMI 24.88 kg/m      Vital sign ranges:    Temp:  [97.6  F (36.4  C)-98.9  F (37.2  C)] 98.2  F (36.8  C)  Pulse:  [81-89] 86  Heart Rate:  [80-98] 94  Resp:  [14-30] 18  BP: (110-147)/(53-80) 147/80  SpO2:  [94 %-100 %] 97 %    General Appearance: in no apparent distress.   Skin: normal, warm, dry, No rashes, induration, or jaundice. Pallor noted.  Heart: well-perfused  Lungs: non-labored breathing on RA  Abdomen: Incision c/d/i. Abdomen flat. Mildly tender RUQ.  : Carroll is not present.  Extremities: edema: none  Neurologic: alert, oriented x4. Tremor absent.     Data:   CMP  Recent Labs   Lab 12/07/19  0555 12/06/19  0552 12/05/19  0605    144 143   POTASSIUM 4.2 3.8 4.7   CHLORIDE 116*  116* 119*   CO2 20 18* 15*   * 134* 226*   BUN 31* 33* 49*   CR 1.68* 1.87* 2.10*   GFRESTIMATED 45* 39* 34*   GFRESTBLACK 52* 46* 40*   DEBORAH 8.3* 8.4* 8.5   MAG 1.3* 1.3* 1.6   PHOS 2.0* 1.8*  --    LIPASE  --   --  70*   ALBUMIN 2.3* 2.3* 2.3*   BILITOTAL 0.4 0.3 0.6   ALKPHOS 145 130 164*   AST 18 13 15   ALT 22 19 21     CBC  Recent Labs   Lab 12/07/19  0555 12/06/19  0552   HGB 7.4* 7.4*   WBC 3.7* 3.4*   PLT 99* 110*     Attestation:    The patient has been seen and evaluated by me.   Vital signs, labs, medications and orders were reviewed.   When obtained, diagnostic images were reviewed by me and interpreted as above.    The care plan was discussed with the multidisciplinary team and I agree with the findings and plan in this note, with any differences recorded in blue.    Immunosuppressive medication management was reviewed and adjusted as reflected in the note and orders.     .
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
## 2019-12-07 NOTE — PROGRESS NOTES
"IP Diabetes Management  Daily Note           Assessment and Plan:   HPI: Mr. Miller Workman is a 53 yo man with a history of type 2 diabetes complicated by peripheral neuropathy and retinopathy, cirrhosis secondary to ESTRADA, HCC, HTN, HLD, GERD, BPH, who is s/p DBD liver transplant on 11/11/19, who was readmitted 12/2/19 with acute renal failure, confusion, and elevated tacrolimus level.     Assessment:   1)Type II Diabetes Mellitus  2)Enteral feed hyperglycemia   Blood sugar has been well controlled, however morning still running a little bit high    Plan:   -Lantus (sub for tresiba) 14 units daily  1:10g CHO  MDSSI AC, HS, 0200  NPH 15 units with cycled tube feeds---> increased to 20 units(ordered for you)    Outpatient follow up: with [facility]  Plan discussed with patient.     I have seen and examined the patient and agree with the fellow's plan of care as noted. Also reminded pt that he can eat the items available to him on his diet to encourage po intake  December 7, 2019    Dr. Samira Gaviria [phone]    Interval History and Assessment:   interval glucose trend reviewed: Blood sugar well controlled but running slightly on higher side.    Current nutritional intake and type: Orders Placed This Encounter      Regular Diet Adult    PTA Diabetes Regimen:   AccGro Fidelia Expert for glucose monitoring and calculating aspart doses-(settings input into meter for calculation, he only has to enter his BG and CHO intake). Settings: \"meal rise\"- 50mg/dL. Snack size-10g, active insulin time 3 hrs.     Current regimen:   -Lantus (sub for tresiba) 14 units daily  1:10g CHO  MDSSI AC, HS, 0200  NPH 15 units with cycled tube feeds  Held since transplant:   Metformin ER 500mg with supper  Farxiga 10mg daily    Discharge Planning: unknown, anticipating discharge to TCU.         Diabetes History:   Type of Diabetes: Type 2 Diabetes Mellitus, TPN/Enteral Feeding Induced Hyperglycemia  Lab Results   Component Value Date    " A1C 6.6 08/08/2018    A1C 6.5 06/09/2017    A1C 7.8 10/25/2016              Review of Systems:   The Review of Systems is negative other than noted in the Interval History.           Medications:     Current Facility-Administered Medications   Medication     0.9% sodium chloride BOLUS     alpha-lipoic acid capsule 600 mg     aspirin (ASA) EC tablet 325 mg     carvedilol (COREG) tablet 12.5 mg     cyanocobalamin (VITAMIN B-12) tablet 1,000 mcg     dextrose 10 % 1,000 mL infusion     dextrose 10% infusion     glucose gel 15-30 g    Or     dextrose 50 % injection 25-50 mL    Or     glucagon injection 1 mg     ferrous sulfate (FEROSUL) tablet 325 mg     hypromellose-dextran (ARTIFICAL TEARS) 0.1-0.3 % ophthalmic solution 1 drop     insulin 1 unit/mL in saline (NovoLIN, HumuLIN Regular) drip - ADULT IV Infusion     insulin aspart (NovoLOG) inj (RAPID ACTING)     insulin aspart (NovoLOG) inj (RAPID ACTING)     insulin aspart (NovoLOG) inj (RAPID ACTING)     insulin aspart (NovoLOG) inj (RAPID ACTING)     insulin glargine (LANTUS PEN) injection 14 Units     insulin isophane human (HumuLIN N PEN) injection 15 Units     Lidocaine (LIDOCARE) 4 % Patch 1 patch     lidocaine (LMX4) cream     lidocaine 1 % 1 mL     lidocaine patch in PLACE     lidocaine patch REMOVAL     magnesium oxide (MAG-OX) tablet 400 mg     mirtazapine (REMERON) tablet 15 mg     mycophenolic acid (GENERIC EQUIVALENT) EC tablet 540 mg     naloxone (NARCAN) injection 0.1-0.4 mg     omeprazole (priLOSEC) CR capsule 40 mg     ondansetron (ZOFRAN) tablet 4 mg     ondansetron (ZOFRAN-ODT) ODT tab 4 mg     phosphorus tablet 250 mg (PHOSPHA 250 NEUTRAL) per tablet 250 mg     prenatal multivitamin w/iron per tablet 1 tablet     prochlorperazine (COMPAZINE) tablet 5 mg     rosuvastatin (CRESTOR) tablet 5 mg     simethicone (MYLICON) chewable tablet 80 mg     sodium bicarbonate tablet 650 mg     sodium chloride (PF) 0.9% PF flush 3 mL     sodium chloride (PF) 0.9% PF  flush 3 mL     sodium chloride 0.45% infusion     sulfamethoxazole-trimethoprim (BACTRIM/SEPTRA) 400-80 MG per tablet 1 tablet     tacrolimus (GENERIC EQUIVALENT) capsule 3 mg     tamsulosin (FLOMAX) capsule 0.4 mg     [START ON 12/8/2019] tenofovir (VIREAD) tablet 300 mg     traMADol (ULTRAM) half-tab 25 mg     valGANciclovir (VALCYTE) tablet 450 mg     Vitamin D3 (CHOLECALCIFEROL) 25 mcg (1000 units) tablet 2,000 Units            Physical Exam:    BP (!) 145/75 (BP Location: Right arm)   Pulse 86   Temp 97.8  F (36.6  C) (Oral)   Resp 18   Wt 76.4 kg (168 lb 8 oz)   SpO2 97%   BMI 24.88 kg/m    General: pleasant, in no distress, being wheeled to imaging.  HEENT: normocephalic, atraumatic. Oral mucous membranes moist. NGT bridled, in place.   Lungs: unlabored respiration, no cough  ABD: rounded, soft, no lipodystrophy noted  Skin: warm and dry, no obvious lesions  MSK:  moves all extremities  Lymp:  no LE edema   Mental status: awake, alert.  Psych: normal affect, calm and appropriate interaction.             Data:     Recent Labs   Lab 12/07/19  1730 12/07/19  1216 12/07/19  0712 12/07/19  0555 12/07/19  0158 12/06/19  2150 12/06/19  1728  12/06/19  0552  12/05/19  0605  12/04/19  0446  12/03/19  0536  12/02/19  1034   GLC  --   --   --  203*  --   --   --   --  134*  --  226*  --  181*  --  173*  --  194*   * 165* 181*  --  145* 140* 184*   < >  --    < >  --    < >  --    < >  --    < >  --     < > = values in this interval not displayed.       Ascencion Bonilla   Endocrinology Fellow PGY-4  Discussed with staff endocrinologist Samira Art

## 2019-12-08 ENCOUNTER — APPOINTMENT (OUTPATIENT)
Dept: GENERAL RADIOLOGY | Facility: CLINIC | Age: 54
DRG: 682 | End: 2019-12-08
Attending: STUDENT IN AN ORGANIZED HEALTH CARE EDUCATION/TRAINING PROGRAM
Payer: MEDICARE

## 2019-12-08 LAB
ALBUMIN SERPL-MCNC: 2.2 G/DL (ref 3.4–5)
ALP SERPL-CCNC: 142 U/L (ref 40–150)
ALT SERPL W P-5'-P-CCNC: 24 U/L (ref 0–70)
ANION GAP SERPL CALCULATED.3IONS-SCNC: 8 MMOL/L (ref 3–14)
AST SERPL W P-5'-P-CCNC: 22 U/L (ref 0–45)
BILIRUB DIRECT SERPL-MCNC: 0.1 MG/DL (ref 0–0.2)
BILIRUB SERPL-MCNC: 0.5 MG/DL (ref 0.2–1.3)
BUN SERPL-MCNC: 28 MG/DL (ref 7–30)
CALCIUM SERPL-MCNC: 8.2 MG/DL (ref 8.5–10.1)
CHLORIDE SERPL-SCNC: 114 MMOL/L (ref 94–109)
CO2 SERPL-SCNC: 20 MMOL/L (ref 20–32)
CREAT SERPL-MCNC: 1.53 MG/DL (ref 0.66–1.25)
ERYTHROCYTE [DISTWIDTH] IN BLOOD BY AUTOMATED COUNT: 17.3 % (ref 10–15)
GFR SERPL CREATININE-BSD FRML MDRD: 50 ML/MIN/{1.73_M2}
GLUCOSE BLDC GLUCOMTR-MCNC: 121 MG/DL (ref 70–99)
GLUCOSE BLDC GLUCOMTR-MCNC: 132 MG/DL (ref 70–99)
GLUCOSE BLDC GLUCOMTR-MCNC: 135 MG/DL (ref 70–99)
GLUCOSE BLDC GLUCOMTR-MCNC: 172 MG/DL (ref 70–99)
GLUCOSE BLDC GLUCOMTR-MCNC: 190 MG/DL (ref 70–99)
GLUCOSE BLDC GLUCOMTR-MCNC: 204 MG/DL (ref 70–99)
GLUCOSE BLDC GLUCOMTR-MCNC: 215 MG/DL (ref 70–99)
GLUCOSE SERPL-MCNC: 205 MG/DL (ref 70–99)
HCT VFR BLD AUTO: 21.4 % (ref 40–53)
HGB BLD-MCNC: 7.2 G/DL (ref 13.3–17.7)
MAGNESIUM SERPL-MCNC: 1.5 MG/DL (ref 1.6–2.3)
MCH RBC QN AUTO: 30.6 PG (ref 26.5–33)
MCHC RBC AUTO-ENTMCNC: 33.6 G/DL (ref 31.5–36.5)
MCV RBC AUTO: 91 FL (ref 78–100)
PHOSPHATE SERPL-MCNC: 2.6 MG/DL (ref 2.5–4.5)
PLATELET # BLD AUTO: 79 10E9/L (ref 150–450)
POTASSIUM SERPL-SCNC: 4 MMOL/L (ref 3.4–5.3)
PROT SERPL-MCNC: 5.7 G/DL (ref 6.8–8.8)
RBC # BLD AUTO: 2.35 10E12/L (ref 4.4–5.9)
SODIUM SERPL-SCNC: 142 MMOL/L (ref 133–144)
TACROLIMUS BLD-MCNC: 6.9 UG/L (ref 5–15)
TME LAST DOSE: NORMAL H
WBC # BLD AUTO: 4.3 10E9/L (ref 4–11)

## 2019-12-08 PROCEDURE — 83735 ASSAY OF MAGNESIUM: CPT | Performed by: PHYSICIAN ASSISTANT

## 2019-12-08 PROCEDURE — 25000131 ZZH RX MED GY IP 250 OP 636 PS 637: Mod: GY | Performed by: SURGERY

## 2019-12-08 PROCEDURE — 84100 ASSAY OF PHOSPHORUS: CPT | Performed by: PHYSICIAN ASSISTANT

## 2019-12-08 PROCEDURE — 00000146 ZZHCL STATISTIC GLUCOSE BY METER IP

## 2019-12-08 PROCEDURE — 25000128 H RX IP 250 OP 636: Performed by: NURSE PRACTITIONER

## 2019-12-08 PROCEDURE — 25000125 ZZHC RX 250: Performed by: NURSE PRACTITIONER

## 2019-12-08 PROCEDURE — 80048 BASIC METABOLIC PNL TOTAL CA: CPT | Performed by: PHYSICIAN ASSISTANT

## 2019-12-08 PROCEDURE — 25000132 ZZH RX MED GY IP 250 OP 250 PS 637: Mod: GY | Performed by: NURSE PRACTITIONER

## 2019-12-08 PROCEDURE — 25000132 ZZH RX MED GY IP 250 OP 250 PS 637: Mod: GY | Performed by: PHYSICIAN ASSISTANT

## 2019-12-08 PROCEDURE — 25000132 ZZH RX MED GY IP 250 OP 250 PS 637: Mod: GY | Performed by: TRANSPLANT SURGERY

## 2019-12-08 PROCEDURE — 80197 ASSAY OF TACROLIMUS: CPT | Performed by: NURSE PRACTITIONER

## 2019-12-08 PROCEDURE — 25000132 ZZH RX MED GY IP 250 OP 250 PS 637: Mod: GY | Performed by: STUDENT IN AN ORGANIZED HEALTH CARE EDUCATION/TRAINING PROGRAM

## 2019-12-08 PROCEDURE — 36415 COLL VENOUS BLD VENIPUNCTURE: CPT | Performed by: PHYSICIAN ASSISTANT

## 2019-12-08 PROCEDURE — 25000128 H RX IP 250 OP 636: Performed by: PHYSICIAN ASSISTANT

## 2019-12-08 PROCEDURE — 93010 ELECTROCARDIOGRAM REPORT: CPT | Performed by: INTERNAL MEDICINE

## 2019-12-08 PROCEDURE — 85027 COMPLETE CBC AUTOMATED: CPT | Performed by: PHYSICIAN ASSISTANT

## 2019-12-08 PROCEDURE — 27210432 ZZH NUTRITION PRODUCT RENAL BASIC LITER

## 2019-12-08 PROCEDURE — 80076 HEPATIC FUNCTION PANEL: CPT | Performed by: PHYSICIAN ASSISTANT

## 2019-12-08 PROCEDURE — 25000132 ZZH RX MED GY IP 250 OP 250 PS 637: Mod: GY

## 2019-12-08 PROCEDURE — 12000026 ZZH R&B TRANSPLANT

## 2019-12-08 PROCEDURE — 25000131 ZZH RX MED GY IP 250 OP 636 PS 637: Mod: GY | Performed by: NURSE PRACTITIONER

## 2019-12-08 PROCEDURE — 93005 ELECTROCARDIOGRAM TRACING: CPT

## 2019-12-08 PROCEDURE — 36415 COLL VENOUS BLD VENIPUNCTURE: CPT | Performed by: NURSE PRACTITIONER

## 2019-12-08 PROCEDURE — 71046 X-RAY EXAM CHEST 2 VIEWS: CPT

## 2019-12-08 PROCEDURE — 25000131 ZZH RX MED GY IP 250 OP 636 PS 637: Mod: GY | Performed by: PHYSICIAN ASSISTANT

## 2019-12-08 RX ORDER — FUROSEMIDE 10 MG/ML
20 INJECTION INTRAMUSCULAR; INTRAVENOUS ONCE
Status: COMPLETED | OUTPATIENT
Start: 2019-12-08 | End: 2019-12-08

## 2019-12-08 RX ADMIN — FUROSEMIDE 20 MG: 10 INJECTION, SOLUTION INTRAVENOUS at 10:39

## 2019-12-08 RX ADMIN — OMEPRAZOLE 40 MG: 20 CAPSULE, DELAYED RELEASE ORAL at 08:09

## 2019-12-08 RX ADMIN — Medication 400 MG: at 19:42

## 2019-12-08 RX ADMIN — MIRTAZAPINE 15 MG: 15 TABLET, FILM COATED ORAL at 22:49

## 2019-12-08 RX ADMIN — SULFAMETHOXAZOLE AND TRIMETHOPRIM 1 TABLET: 400; 80 TABLET ORAL at 08:08

## 2019-12-08 RX ADMIN — SALINE NASAL SPRAY 1 SPRAY: 1.5 SOLUTION NASAL at 06:14

## 2019-12-08 RX ADMIN — MYCOPHENOLIC ACID 540 MG: 360 TABLET, DELAYED RELEASE ORAL at 19:42

## 2019-12-08 RX ADMIN — TACROLIMUS 3 MG: 1 CAPSULE ORAL at 08:08

## 2019-12-08 RX ADMIN — INSULIN ASPART 1 UNITS: 100 INJECTION, SOLUTION INTRAVENOUS; SUBCUTANEOUS at 15:48

## 2019-12-08 RX ADMIN — ONDANSETRON 4 MG: 4 TABLET, ORALLY DISINTEGRATING ORAL at 08:07

## 2019-12-08 RX ADMIN — DIBASIC SODIUM PHOSPHATE, MONOBASIC POTASSIUM PHOSPHATE AND MONOBASIC SODIUM PHOSPHATE 250 MG: 852; 155; 130 TABLET ORAL at 08:08

## 2019-12-08 RX ADMIN — CYANOCOBALAMIN TAB 1000 MCG 1000 MCG: 1000 TAB at 18:28

## 2019-12-08 RX ADMIN — FERROUS SULFATE TAB 325 MG (65 MG ELEMENTAL FE) 325 MG: 325 (65 FE) TAB at 18:28

## 2019-12-08 RX ADMIN — PRENATAL VIT W/ FE FUMARATE-FA TAB 27-0.8 MG 1 TABLET: 27-0.8 TAB at 08:08

## 2019-12-08 RX ADMIN — ROSUVASTATIN CALCIUM 5 MG: 5 TABLET, FILM COATED ORAL at 19:42

## 2019-12-08 RX ADMIN — TACROLIMUS 3.5 MG: 1 CAPSULE ORAL at 18:27

## 2019-12-08 RX ADMIN — MYCOPHENOLIC ACID 540 MG: 360 TABLET, DELAYED RELEASE ORAL at 08:08

## 2019-12-08 RX ADMIN — Medication 600 MG: at 18:29

## 2019-12-08 RX ADMIN — SODIUM BICARBONATE 650 MG TABLET 650 MG: at 19:42

## 2019-12-08 RX ADMIN — Medication 2 G: at 08:15

## 2019-12-08 RX ADMIN — DIBASIC SODIUM PHOSPHATE, MONOBASIC POTASSIUM PHOSPHATE AND MONOBASIC SODIUM PHOSPHATE 250 MG: 852; 155; 130 TABLET ORAL at 19:42

## 2019-12-08 RX ADMIN — VALGANCICLOVIR 450 MG: 450 TABLET, FILM COATED ORAL at 08:08

## 2019-12-08 RX ADMIN — TENOFOVIR DISOPROXIL FUMARATE 300 MG: 300 TABLET ORAL at 08:08

## 2019-12-08 RX ADMIN — INSULIN ASPART 2 UNITS: 100 INJECTION, SOLUTION INTRAVENOUS; SUBCUTANEOUS at 11:54

## 2019-12-08 RX ADMIN — ASPIRIN 325 MG: 325 TABLET, DELAYED RELEASE ORAL at 08:09

## 2019-12-08 RX ADMIN — FERROUS SULFATE TAB 325 MG (65 MG ELEMENTAL FE) 325 MG: 325 (65 FE) TAB at 08:08

## 2019-12-08 RX ADMIN — INSULIN ASPART 2 UNITS: 100 INJECTION, SOLUTION INTRAVENOUS; SUBCUTANEOUS at 08:09

## 2019-12-08 RX ADMIN — CARVEDILOL 12.5 MG: 12.5 TABLET, FILM COATED ORAL at 08:08

## 2019-12-08 RX ADMIN — MELATONIN 2000 UNITS: at 18:28

## 2019-12-08 RX ADMIN — FERROUS SULFATE TAB 325 MG (65 MG ELEMENTAL FE) 325 MG: 325 (65 FE) TAB at 11:57

## 2019-12-08 RX ADMIN — Medication 400 MG: at 11:54

## 2019-12-08 RX ADMIN — CARVEDILOL 12.5 MG: 12.5 TABLET, FILM COATED ORAL at 18:28

## 2019-12-08 RX ADMIN — TAMSULOSIN HYDROCHLORIDE 0.4 MG: 0.4 CAPSULE ORAL at 19:42

## 2019-12-08 RX ADMIN — SODIUM BICARBONATE 650 MG TABLET 650 MG: at 08:09

## 2019-12-08 ASSESSMENT — ACTIVITIES OF DAILY LIVING (ADL)
ADLS_ACUITY_SCORE: 16

## 2019-12-08 ASSESSMENT — PAIN DESCRIPTION - DESCRIPTORS
DESCRIPTORS: DISCOMFORT
DESCRIPTORS: DISCOMFORT

## 2019-12-08 NOTE — PLAN OF CARE
Pt is A&O x 4, VSS, on RA.  Patient c/o left lower back pain but declined any medication- heating pad applied with some relief.  Patient with fair appetite- Cycled TF from 7799-5926. Voiding adequately using urinal. Up with SBA. Will continue to monitor. Plan is to discharge to TCU when able.    Care hours 1900 -2330

## 2019-12-08 NOTE — PROGRESS NOTES
Transplant Surgery  Inpatient Daily Progress Note  12/08/2019    Assessment & Plan: Frandy Workman is a 54 year old male with PMHx significant for cirrhosis secondary to ESTRADA diagnosed in 2013, HCC 2018, HTN, HLD, GERD, BPH and DMII. S/p liver transplant 11/11/19 complicated by hematoma evacuation on POD 1. Now admitted with JERONIMO, nausea, diarrhea, confusion and weakness.     Graft function:   S/p liver transplant: DD OLT 11/11/19. LFTs stable. 12/4 US liver d/t RUQ tenderness revealed multiple small fluid collections around liver; patent vasculature.   Immunosuppression management:   CC: Myfortic 540 mg BID, changed from Cellcept on 12/2 due to nausea.  FK: Tacrolimus 3 mg BID; goal 10-12 (12 hr trough). Level 12/7= 8 (10.5 hr trough), increase prograf to 3 mg BID. FK level today= 7-(12 hr trough) -increase to 3.5mg.  Complexity of management:Medium. Contributing factors: organ dysfunction and confusion  Hematology:   Normocytic Anemia: Hgb stable at 7. Received 1unit PRBC on 12/4 & 12/5. Hemoccult stool positive. % Retic 1.5. . EGD and Colonoscopy revealed gastritis and ileitis.   Cardiorespiratory:   Hypertension: PTA Coreg held on admission due to hypotension. Restarted Coreg 12.5mg BID.  Shortness of Breath: CXR this am. New small right pleural effusion with associated right lower lung opacities, which likely represent atelectasis versus consolidation. CIV today. Lasix 20mg x1, CDB/IS   GI/Nutrition:   Severe malnutrition in the context of acute on chronic illness: Minimal intake outpatient due to confusion, no appetite. NJ placed for TF initiation. RD consulted.  TF now cycled. Restart calorie counts  Endocrine:   Type II diabetes mellitus: PTA was on Tresiba, carb coverage and sliding scale insulin. Hypoglycemia at home due to poor PO intake. Sliding scale insulin and carb coverage restarted. HoldingTresiba at this time. Endocrinology consulted and started Lantus 14 units daily and NPH 20 every  evening with tube feeds.   Fluid/Electrolytes:   JERONIMO: Likely r/t dehydration/hypovolemia. SCr 3.2 on admission, improving to 1.5 today  Hyperkalemia: D/t tacro, acidosis, JERONIMO on admission-responded to Kayexalate. 4 today.   Hyperchloremia: improved with change in IVF. Cl 114 today.  Added free water to TF. Oral fluid intake is good.   Hypernatremia: Na 142. Continue free water.  Low bicarb: Due to JERONIMO and diarrhea. CO2 improved to 20 with BID bicarb.   Hypomagnesemia: mag 1.5, give 2gm IV today, continue PO supplement  Hypophosphatemia: improved, Phos 2.6  : No acute issues  Infectious disease: Low grade temp, Tmax 100F; WBC 4.3. UA unremarkable. CMV 12/2 not detected.   Diarrhea: Worsening diarrhea prior to admission. C. Diff, enteric viral panel, and rotavirus negative. Fecal lactoferrin positive. Diarrhea improving.  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   Neuro:   Confusion: Patient intermittently confused at home. MOCA indicating cognitive impairment. No confusion today.  Anxiety: Health psychology and psych consulted. Remeron initiated at bedtime per psych recommendations. Health psychology to see patient weekly while inpatient.  Prophylaxis: DVT (mechanical), fall, GI (protonix), viral (Valcyte), pneumocystis (Bactrim)  Disposition: 7A; PT/OT evaluating. Medically ready since 12/5, awaiting TCU transfer.     Medical Decision Making: Medium  Subsequent visit [id number] (moderate level decision making)    SERA/Fellow/Resident Provider: Candelaria Chong NP      Faculty: Berlin Hernandez M.D.    __________________________________________________________________  Transplant History:    11/11/2019 (Liver), Postoperative day: 27     Interval History: History is obtained from the patient  C/o new chest pain, +nasal drainage and NP cough.  Not requiring oxygen.  UP to chair. Reports feeling full on TF. Attempted to eat breakfast this am and vomited.      ROS:   A 10-point review of systems was negative except as noted above.    Curent Meds:    alpha-lipoic acid  600 mg Oral Daily     aspirin  325 mg Oral Daily     carvedilol  12.5 mg Oral BID w/meals     cyanocobalamin  1,000 mcg Oral Daily     ferrous sulfate  325 mg Oral TID w/meals     insulin aspart  1-5 Units Subcutaneous BID     insulin aspart  1-7 Units Subcutaneous TID AC     insulin aspart   Subcutaneous TID w/meals     insulin glargine  14 Units Subcutaneous QAM     insulin isophane human  20 Units Subcutaneous QPM     lidocaine   Transdermal Q8H     magnesium oxide  400 mg Oral BID     mirtazapine  15 mg Oral At Bedtime     mycophenolic acid  540 mg Oral Q12H     omeprazole  40 mg Oral Daily     phosphorus tablet 250 mg  250 mg Oral BID     prenatal multivitamin w/iron  1 tablet Oral Daily     rosuvastatin  5 mg Oral Daily     sodium bicarbonate  650 mg Oral BID     sodium chloride (PF)  3 mL Intracatheter Q8H     sulfamethoxazole-trimethoprim  1 tablet Oral Daily     tacrolimus  3 mg Oral BID     tamsulosin  0.4 mg Oral Daily     tenofovir  300 mg Oral Daily     valGANciclovir  450 mg Oral Daily     vitamin D3  2,000 Units Oral Daily       Physical Exam:     Admit Weight: 73.8 kg (162 lb 11.2 oz)    Current Vitals:   BP (!) 154/78   Pulse 84   Temp 98.2  F (36.8  C)   Resp 18   Wt 77.3 kg (170 lb 6.4 oz)   SpO2 96%   BMI 25.16 kg/m      Vital sign ranges:    Temp:  [97.8  F (36.6  C)-100  F (37.8  C)] 98.2  F (36.8  C)  Pulse:  [84] 84  Heart Rate:  [85-99] 96  Resp:  [16-22] 18  BP: (134-154)/(67-78) 154/78  SpO2:  [94 %-97 %] 96 %    General Appearance: in no apparent distress.   Skin: normal, warm, dry, No rashes, induration, or jaundice. Pallor noted.  Heart: RRR  Lungs: non-labored breathing on RA, shallow respirations, , +NP cough, diminished to bilateral bases,   Abdomen: Incision c/d/i. Abdomen flat. Mildly tender RUQ.  : Carroll is not present.  Extremities: edema:  none  Neurologic: alert, oriented x4. Tremor absent.     Data:   CMP  Recent Labs   Lab 12/08/19  0522 12/07/19  0555  12/05/19  0605    142   < > 143   POTASSIUM 4.0 4.2   < > 4.7   CHLORIDE 114* 116*   < > 119*   CO2 20 20   < > 15*   * 203*   < > 226*   BUN 28 31*   < > 49*   CR 1.53* 1.68*   < > 2.10*   GFRESTIMATED 50* 45*   < > 34*   GFRESTBLACK 58* 52*   < > 40*   DEBORAH 8.2* 8.3*   < > 8.5   MAG 1.5* 1.3*   < > 1.6   PHOS 2.6 2.0*   < >  --    LIPASE  --   --   --  70*   ALBUMIN 2.2* 2.3*   < > 2.3*   BILITOTAL 0.5 0.4   < > 0.6   ALKPHOS 142 145   < > 164*   AST 22 18   < > 15   ALT 24 22   < > 21    < > = values in this interval not displayed.     CBC  Recent Labs   Lab 12/08/19  0522 12/07/19  0555   HGB 7.2* 7.4*   WBC 4.3 3.7*   PLT 79* 99*     Attestation:    The patient has been seen and evaluated by me.   Vital signs, labs, medications and orders were reviewed.   When obtained, diagnostic images were reviewed by me and interpreted as above.    The care plan was discussed with the multidisciplinary team and I agree with the findings and plan in this note, with any differences recorded in blue.    Immunosuppressive medication management was reviewed and adjusted as reflected in the note and orders.     .

## 2019-12-08 NOTE — PLAN OF CARE
AVSS, reporting some chest discomfort, but states improving overall.  Blood gybqyecn=897, 190  Patient up independently, voiding not saving, passing flatus, reported BM this AM, and tolerating diet with fair appetite.  Magnesium=1.5 and replaced per one time order.  Scheduled meds given as ordered.  Continue to monitor, treat as ordered, notify team with changes.

## 2019-12-08 NOTE — PLAN OF CARE
[id number]  /72 (BP Location: Right arm)   Pulse 84   Temp 98.9  F (37.2  C) (Oral)   Resp 18   Wt 77.3 kg (170 lb 6.4 oz)   SpO2 94%   BMI 25.16 kg/m      Pt vitally stable on RA. Denied pain or nausea this shift. BG ACHS, was 135 at 0200, no coverage needed. Mg replaced yesterday, level to be rechecked this morning. On regular diet but poor appetite per report. Cycled feeding tube (6P-6A) through NJ running at a goal of 60 ml/hr with 20 ml of flush Q 1 hour. L PIV running 1/2 NS @ 75 ml/hr. NJ running FT. Clamshell incision approximated with sterile-strips & CDI. A-1 with gait belt. Last BM yesterday, voiding well, uses urinal. SBA with gait belt and cane for mobility. Possibly to discharge to TCU on Monday. Will continue to monitor and follow plan of care.

## 2019-12-08 NOTE — PLAN OF CARE
"Pt had a temp of 100.0 when checked vitals at 6 am. /73. OVSS on RA. Pt stated that he was slightly SOB and feels lots of \"pressure\" on his chest. Pt was also anxious and was a little confused suddenly, asking \"what should I order for breakfast\", \"how much facial hair do I have\". BG was 205 with labs this morning. Provider notified, EKG ordered and was negative. Pt to have chest XR ordered. Incentive spirometer given and instructed pt how to use it. Pt did c/o nasal congestion at the  beginning of the shift due to seasonal allergies. Provider was notified and had ocean spray order. Updated day team provider and AM nurse.   "

## 2019-12-08 NOTE — PROGRESS NOTES
"IP Diabetes Management  Daily Note           Assessment and Plan:   HPI: Mr. Miller Workman is a 55 yo man with a history of type 2 diabetes complicated by peripheral neuropathy and retinopathy, cirrhosis secondary to ESTRADA, HCC, HTN, HLD, GERD, BPH, who is s/p DBD liver transplant on 11/11/19, who was readmitted 12/2/19 with acute renal failure, confusion, and elevated tacrolimus level.     Assessment:   1)Type II Diabetes Mellitus  2)Enteral feed hyperglycemia   Blood sugar has been well controlled, however morning still running a little bit high    Plan:   -Lantus (sub for tresiba) 14 units daily  1:10g CHO  MDSSI AC, HS, 0200  NPH 15 units with cycled tube feeds---> increased to 20 units(12/8/2019)    Outpatient follow up: with [facility]  Plan discussed with patient.     I have seen and examined the patient and agree with the fellow's plan of care as noted.  December 8, 2019    Dr. Samira Gaviria [phone]      Interval History and Assessment:   interval glucose trend reviewed: Blood sugar well controlled but running slightly on higher side.    Current nutritional intake and type: Orders Placed This Encounter      Regular Diet Adult    PTA Diabetes Regimen:   [id number] Expert for glucose monitoring and calculating aspart doses-(settings input into meter for calculation, he only has to enter his BG and CHO intake). Settings: \"meal rise\"- 50mg/dL. Snack size-10g, active insulin time 3 hrs.     Current regimen:   -Lantus (sub for tresiba) 14 units daily  1:10g CHO  MDSSI AC, HS, 0200  NPH 20 units with cycled tube feeds  Held since transplant:   Metformin ER 500mg with supper  Farxiga 10mg daily    Discharge Planning: unknown, anticipating discharge to TCU.         Diabetes History:   Type of Diabetes: Type 2 Diabetes Mellitus, TPN/Enteral Feeding Induced Hyperglycemia  Lab Results   Component Value Date    A1C 6.6 08/08/2018    A1C 6.5 06/09/2017    A1C 7.8 10/25/2016              Review of Systems:   The " Review of Systems is negative other than noted in the Interval History.           Medications:     Current Facility-Administered Medications   Medication     0.9% sodium chloride BOLUS     alpha-lipoic acid capsule 600 mg     aspirin (ASA) EC tablet 325 mg     carvedilol (COREG) tablet 12.5 mg     cyanocobalamin (VITAMIN B-12) tablet 1,000 mcg     dextrose 10 % 1,000 mL infusion     dextrose 10% infusion     glucose gel 15-30 g    Or     dextrose 50 % injection 25-50 mL    Or     glucagon injection 1 mg     ferrous sulfate (FEROSUL) tablet 325 mg     hypromellose-dextran (ARTIFICAL TEARS) 0.1-0.3 % ophthalmic solution 1 drop     insulin 1 unit/mL in saline (NovoLIN, HumuLIN Regular) drip - ADULT IV Infusion     insulin aspart (NovoLOG) inj (RAPID ACTING)     insulin aspart (NovoLOG) inj (RAPID ACTING)     insulin aspart (NovoLOG) inj (RAPID ACTING)     insulin aspart (NovoLOG) inj (RAPID ACTING)     insulin glargine (LANTUS PEN) injection 14 Units     insulin isophane human (HumuLIN N PEN) injection 20 Units     Lidocaine (LIDOCARE) 4 % Patch 1 patch     lidocaine (LMX4) cream     lidocaine 1 % 1 mL     lidocaine patch in PLACE     lidocaine patch REMOVAL     magnesium oxide (MAG-OX) tablet 400 mg     mirtazapine (REMERON) tablet 15 mg     mycophenolic acid (GENERIC EQUIVALENT) EC tablet 540 mg     naloxone (NARCAN) injection 0.1-0.4 mg     omeprazole (priLOSEC) CR capsule 40 mg     ondansetron (ZOFRAN) tablet 4 mg     ondansetron (ZOFRAN-ODT) ODT tab 4 mg     phosphorus tablet 250 mg (PHOSPHA 250 NEUTRAL) per tablet 250 mg     prenatal multivitamin w/iron per tablet 1 tablet     prochlorperazine (COMPAZINE) tablet 5 mg     rosuvastatin (CRESTOR) tablet 5 mg     simethicone (MYLICON) chewable tablet 80 mg     sodium bicarbonate tablet 650 mg     sodium chloride (OCEAN) 0.65 % nasal spray 1 spray     sodium chloride (PF) 0.9% PF flush 3 mL     sodium chloride (PF) 0.9% PF flush 3 mL     sulfamethoxazole-trimethoprim  (BACTRIM/SEPTRA) 400-80 MG per tablet 1 tablet     tacrolimus (GENERIC EQUIVALENT) capsule 3 mg     tamsulosin (FLOMAX) capsule 0.4 mg     tenofovir (VIREAD) tablet 300 mg     traMADol (ULTRAM) half-tab 25 mg     valGANciclovir (VALCYTE) tablet 450 mg     Vitamin D3 (CHOLECALCIFEROL) 25 mcg (1000 units) tablet 2,000 Units            Physical Exam:    /70   Pulse 84   Temp 98  F (36.7  C)   Resp 20   Wt 77.3 kg (170 lb 6.4 oz)   SpO2 99%   BMI 25.16 kg/m    General: pleasant, in no distress, being wheeled to imaging.  HEENT: normocephalic, atraumatic. Oral mucous membranes moist. NGT bridled, in place.   Lungs: unlabored respiration, no cough  ABD: rounded, soft, no lipodystrophy noted  Skin: warm and dry, no obvious lesions  MSK:  moves all extremities  Lymp:  no LE edema   Mental status: awake, alert.  Psych: normal affect, calm and appropriate interaction.             Data:     Recent Labs   Lab 12/08/19  0727 12/08/19  0522 12/08/19  0122 12/07/19  2224 12/07/19  1730 12/07/19  1216 12/07/19  0712 12/07/19  0555  12/06/19  0552  12/05/19  0605  12/04/19  0446  12/03/19  0536   GLC  --  205*  --   --   --   --   --  203*  --  134*  --  226*  --  181*  --  173*   *  --  135* 87 128* 165* 181*  --    < >  --    < >  --    < >  --    < >  --     < > = values in this interval not displayed.       Ascencion Bonilla   Endocrinology Fellow PGY-4  Discussed with staff endocrinologist Dr. Gaviria, Samira

## 2019-12-09 ENCOUNTER — APPOINTMENT (OUTPATIENT)
Dept: OCCUPATIONAL THERAPY | Facility: CLINIC | Age: 54
DRG: 682 | End: 2019-12-09
Attending: TRANSPLANT SURGERY
Payer: MEDICARE

## 2019-12-09 ENCOUNTER — APPOINTMENT (OUTPATIENT)
Dept: PHYSICAL THERAPY | Facility: CLINIC | Age: 54
DRG: 682 | End: 2019-12-09
Attending: TRANSPLANT SURGERY
Payer: MEDICARE

## 2019-12-09 LAB
ALBUMIN SERPL-MCNC: 2.3 G/DL (ref 3.4–5)
ALP SERPL-CCNC: 155 U/L (ref 40–150)
ALT SERPL W P-5'-P-CCNC: 28 U/L (ref 0–70)
ANION GAP SERPL CALCULATED.3IONS-SCNC: 6 MMOL/L (ref 3–14)
AST SERPL W P-5'-P-CCNC: 16 U/L (ref 0–45)
BILIRUB DIRECT SERPL-MCNC: <0.1 MG/DL (ref 0–0.2)
BILIRUB SERPL-MCNC: 0.2 MG/DL (ref 0.2–1.3)
BUN SERPL-MCNC: 30 MG/DL (ref 7–30)
CALCIUM SERPL-MCNC: 8.7 MG/DL (ref 8.5–10.1)
CHLORIDE SERPL-SCNC: 110 MMOL/L (ref 94–109)
CO2 SERPL-SCNC: 22 MMOL/L (ref 20–32)
CREAT SERPL-MCNC: 1.58 MG/DL (ref 0.66–1.25)
ERYTHROCYTE [DISTWIDTH] IN BLOOD BY AUTOMATED COUNT: 17.3 % (ref 10–15)
GFR SERPL CREATININE-BSD FRML MDRD: 48 ML/MIN/{1.73_M2}
GLUCOSE BLDC GLUCOMTR-MCNC: 111 MG/DL (ref 70–99)
GLUCOSE BLDC GLUCOMTR-MCNC: 152 MG/DL (ref 70–99)
GLUCOSE BLDC GLUCOMTR-MCNC: 165 MG/DL (ref 70–99)
GLUCOSE BLDC GLUCOMTR-MCNC: 172 MG/DL (ref 70–99)
GLUCOSE BLDC GLUCOMTR-MCNC: 204 MG/DL (ref 70–99)
GLUCOSE BLDC GLUCOMTR-MCNC: 234 MG/DL (ref 70–99)
GLUCOSE SERPL-MCNC: 232 MG/DL (ref 70–99)
HCT VFR BLD AUTO: 21.7 % (ref 40–53)
HGB BLD-MCNC: 7.1 G/DL (ref 13.3–17.7)
INTERPRETATION ECG - MUSE: NORMAL
MAGNESIUM SERPL-MCNC: 1.7 MG/DL (ref 1.6–2.3)
MCH RBC QN AUTO: 30.3 PG (ref 26.5–33)
MCHC RBC AUTO-ENTMCNC: 32.7 G/DL (ref 31.5–36.5)
MCV RBC AUTO: 93 FL (ref 78–100)
PLATELET # BLD AUTO: 78 10E9/L (ref 150–450)
POTASSIUM SERPL-SCNC: 4.2 MMOL/L (ref 3.4–5.3)
PROT SERPL-MCNC: 5.9 G/DL (ref 6.8–8.8)
RBC # BLD AUTO: 2.34 10E12/L (ref 4.4–5.9)
SODIUM SERPL-SCNC: 138 MMOL/L (ref 133–144)
TACROLIMUS BLD-MCNC: 6.3 UG/L (ref 5–15)
TME LAST DOSE: NORMAL H
WBC # BLD AUTO: 4.6 10E9/L (ref 4–11)

## 2019-12-09 PROCEDURE — 25000132 ZZH RX MED GY IP 250 OP 250 PS 637: Mod: GY | Performed by: NURSE PRACTITIONER

## 2019-12-09 PROCEDURE — 80048 BASIC METABOLIC PNL TOTAL CA: CPT | Performed by: PHYSICIAN ASSISTANT

## 2019-12-09 PROCEDURE — 97110 THERAPEUTIC EXERCISES: CPT | Mod: GP | Performed by: REHABILITATION PRACTITIONER

## 2019-12-09 PROCEDURE — 12000026 ZZH R&B TRANSPLANT

## 2019-12-09 PROCEDURE — 83735 ASSAY OF MAGNESIUM: CPT | Performed by: PHYSICIAN ASSISTANT

## 2019-12-09 PROCEDURE — 25000132 ZZH RX MED GY IP 250 OP 250 PS 637: Mod: GY | Performed by: TRANSPLANT SURGERY

## 2019-12-09 PROCEDURE — 85027 COMPLETE CBC AUTOMATED: CPT | Performed by: PHYSICIAN ASSISTANT

## 2019-12-09 PROCEDURE — 36415 COLL VENOUS BLD VENIPUNCTURE: CPT | Performed by: PHYSICIAN ASSISTANT

## 2019-12-09 PROCEDURE — 97535 SELF CARE MNGMENT TRAINING: CPT | Mod: GO

## 2019-12-09 PROCEDURE — 97530 THERAPEUTIC ACTIVITIES: CPT | Mod: GP | Performed by: REHABILITATION PRACTITIONER

## 2019-12-09 PROCEDURE — 25000131 ZZH RX MED GY IP 250 OP 636 PS 637: Mod: GY | Performed by: PHYSICIAN ASSISTANT

## 2019-12-09 PROCEDURE — 97116 GAIT TRAINING THERAPY: CPT | Mod: GP | Performed by: REHABILITATION PRACTITIONER

## 2019-12-09 PROCEDURE — 80197 ASSAY OF TACROLIMUS: CPT | Performed by: NURSE PRACTITIONER

## 2019-12-09 PROCEDURE — 80076 HEPATIC FUNCTION PANEL: CPT | Performed by: PHYSICIAN ASSISTANT

## 2019-12-09 PROCEDURE — 25000132 ZZH RX MED GY IP 250 OP 250 PS 637: Mod: GY | Performed by: STUDENT IN AN ORGANIZED HEALTH CARE EDUCATION/TRAINING PROGRAM

## 2019-12-09 PROCEDURE — 27210432 ZZH NUTRITION PRODUCT RENAL BASIC LITER

## 2019-12-09 PROCEDURE — 25000131 ZZH RX MED GY IP 250 OP 636 PS 637: Mod: GY | Performed by: NURSE PRACTITIONER

## 2019-12-09 PROCEDURE — 00000146 ZZHCL STATISTIC GLUCOSE BY METER IP

## 2019-12-09 PROCEDURE — 25000132 ZZH RX MED GY IP 250 OP 250 PS 637: Mod: GY | Performed by: PHYSICIAN ASSISTANT

## 2019-12-09 PROCEDURE — 25000132 ZZH RX MED GY IP 250 OP 250 PS 637: Mod: GY

## 2019-12-09 RX ORDER — TACROLIMUS 1 MG/1
4 CAPSULE ORAL 2 TIMES DAILY
Status: DISCONTINUED | OUTPATIENT
Start: 2019-12-09 | End: 2019-12-10 | Stop reason: HOSPADM

## 2019-12-09 RX ADMIN — DIBASIC SODIUM PHOSPHATE, MONOBASIC POTASSIUM PHOSPHATE AND MONOBASIC SODIUM PHOSPHATE 250 MG: 852; 155; 130 TABLET ORAL at 08:49

## 2019-12-09 RX ADMIN — SODIUM BICARBONATE 650 MG TABLET 650 MG: at 20:07

## 2019-12-09 RX ADMIN — TACROLIMUS 3.5 MG: 1 CAPSULE ORAL at 08:47

## 2019-12-09 RX ADMIN — INSULIN ASPART 2 UNITS: 100 INJECTION, SOLUTION INTRAVENOUS; SUBCUTANEOUS at 08:50

## 2019-12-09 RX ADMIN — CYANOCOBALAMIN TAB 1000 MCG 1000 MCG: 1000 TAB at 17:32

## 2019-12-09 RX ADMIN — CARVEDILOL 12.5 MG: 12.5 TABLET, FILM COATED ORAL at 08:49

## 2019-12-09 RX ADMIN — Medication 400 MG: at 12:56

## 2019-12-09 RX ADMIN — CARVEDILOL 12.5 MG: 12.5 TABLET, FILM COATED ORAL at 17:32

## 2019-12-09 RX ADMIN — SODIUM BICARBONATE 650 MG TABLET 650 MG: at 08:49

## 2019-12-09 RX ADMIN — SIMETHICONE CHEW TAB 80 MG 80 MG: 80 TABLET ORAL at 00:01

## 2019-12-09 RX ADMIN — FERROUS SULFATE TAB 325 MG (65 MG ELEMENTAL FE) 325 MG: 325 (65 FE) TAB at 08:49

## 2019-12-09 RX ADMIN — TENOFOVIR DISOPROXIL FUMARATE 300 MG: 300 TABLET ORAL at 08:50

## 2019-12-09 RX ADMIN — TACROLIMUS 4 MG: 1 CAPSULE ORAL at 17:29

## 2019-12-09 RX ADMIN — MELATONIN 2000 UNITS: at 17:34

## 2019-12-09 RX ADMIN — Medication 25 MG: at 04:47

## 2019-12-09 RX ADMIN — MYCOPHENOLIC ACID 540 MG: 360 TABLET, DELAYED RELEASE ORAL at 08:47

## 2019-12-09 RX ADMIN — TAMSULOSIN HYDROCHLORIDE 0.4 MG: 0.4 CAPSULE ORAL at 20:07

## 2019-12-09 RX ADMIN — OMEPRAZOLE 40 MG: 20 CAPSULE, DELAYED RELEASE ORAL at 08:48

## 2019-12-09 RX ADMIN — MIRTAZAPINE 15 MG: 15 TABLET, FILM COATED ORAL at 22:03

## 2019-12-09 RX ADMIN — DIBASIC SODIUM PHOSPHATE, MONOBASIC POTASSIUM PHOSPHATE AND MONOBASIC SODIUM PHOSPHATE 250 MG: 852; 155; 130 TABLET ORAL at 20:07

## 2019-12-09 RX ADMIN — INSULIN ASPART 1 UNITS: 100 INJECTION, SOLUTION INTRAVENOUS; SUBCUTANEOUS at 12:47

## 2019-12-09 RX ADMIN — PRENATAL VIT W/ FE FUMARATE-FA TAB 27-0.8 MG 1 TABLET: 27-0.8 TAB at 08:49

## 2019-12-09 RX ADMIN — Medication 600 MG: at 17:30

## 2019-12-09 RX ADMIN — Medication 400 MG: at 20:07

## 2019-12-09 RX ADMIN — MYCOPHENOLIC ACID 540 MG: 360 TABLET, DELAYED RELEASE ORAL at 20:07

## 2019-12-09 RX ADMIN — VALGANCICLOVIR 450 MG: 450 TABLET, FILM COATED ORAL at 08:48

## 2019-12-09 RX ADMIN — ASPIRIN 325 MG: 325 TABLET, DELAYED RELEASE ORAL at 08:48

## 2019-12-09 RX ADMIN — SULFAMETHOXAZOLE AND TRIMETHOPRIM 1 TABLET: 400; 80 TABLET ORAL at 08:48

## 2019-12-09 RX ADMIN — FERROUS SULFATE TAB 325 MG (65 MG ELEMENTAL FE) 325 MG: 325 (65 FE) TAB at 12:56

## 2019-12-09 RX ADMIN — ROSUVASTATIN CALCIUM 5 MG: 5 TABLET, FILM COATED ORAL at 20:11

## 2019-12-09 RX ADMIN — FERROUS SULFATE TAB 325 MG (65 MG ELEMENTAL FE) 325 MG: 325 (65 FE) TAB at 17:32

## 2019-12-09 ASSESSMENT — ACTIVITIES OF DAILY LIVING (ADL)
ADLS_ACUITY_SCORE: 14
ADLS_ACUITY_SCORE: 14
ADLS_ACUITY_SCORE: 16
ADLS_ACUITY_SCORE: 14

## 2019-12-09 ASSESSMENT — PAIN DESCRIPTION - DESCRIPTORS
DESCRIPTORS: ACHING
DESCRIPTORS: ACHING

## 2019-12-09 NOTE — PROGRESS NOTES
CLINICAL NUTRITION SERVICES - DISCHARGE NOTE    Patient s discharge needs assessed and discharge planning has been conducted with the multidisciplinary transplant care team including physicians, pharmacy, social work and transplant coordinator.    Follow up/Monitoring:  Once discharged, place outpatient nutrition consult via the transplant team if nutrition concerns arise.    Danielle Beavers, MS/RD/ORLIN/CNSC  7A RD Pager: [phone]

## 2019-12-09 NOTE — PLAN OF CARE
OT/7A - Discharge Planner OT   Patient plan for discharge: TCU today  Current status: Facilitated standing shower with frequent cues for safety and compliance with post surgical abdominal precautions. Would recommend use of shower chair and supervision for bathing for increased safety. Difficulty noted with using compensatory figure four method during LB bathing. Pt denies incisional pain.   Barriers to return to prior living situation: limited support, post surgical precautions, higher level cognition, strength  Recommendations for discharge: TCU  Rationale for recommendations: to increase ADL/IADL IND prior to return home       Entered by: Josefa Acuna 12/09/2019 11:50 AM

## 2019-12-09 NOTE — PLAN OF CARE
[id number]    VSS. BG AC & HS, on sliding scale insulin and carb coverage, lantus, and humulin during TFs. TFs are cycled from 8066-1910, although, writer made and error and started TFs at 2000. Please have them run until 0800. Humilin was given at 2000, but retimed for 1800 for tomorrow's dose. Pt is doing well. Ambulated in the [place] independently several times this shift. Is engaging in IS independently. Experienced some SOB this morning which he says has improved after a dose of lasix. Pt is planning on discharging to TCU tomorrow. Lab card is up to day. Pt is very involved in making sure medications are correct. Asking lots of good questions. Is anxious about discharging tomorrow. Reassurance provided. Pt has a friend that is his main support that lives in [place]. He calls him every day and is very involved in his care.

## 2019-12-09 NOTE — PROGRESS NOTES
Transplant Social Work Service Discharge Note      Patient Name:  Frandy Workman     Anticipated Discharge Date:  12/9/2019    Discharge Disposition:   TCU:  [facility]    Plan for 24 hour care for immediate post transplant period: Patient will receive care from  TCU staff    If not local, plans for short term stay:  N/A    Additional Services/Equipment Arranged:  [id number]  IMM signed, given to patient, and copy in chart  [facility] ([phone]) set up for 1700     Persons notified of above discharge plan:  Patient, treatment team, nursing staff,  TCU staff    Patient / Family response to discharge plan:  Agreeable    Education and resources provided by SW at discharge: role of transplant  in out patient setting and provided contact info for , availability of support groups    Plan: Pending staffing, patient will discharge to  TCU today at 1700 via [facility]. Patient will need [facility] to come out the day of discharge from TCU to set up his medicine. Patient's friend Art will be coming to [place] on 12/28/19 and will hopefully be able to help patient. No further SW needs at discharge.     Addendum: Patient unable to go to TCU today due to staffing issues. SW rescheduled the ride for 12/10 at 2:45 PM. SW updated team, nursing staff, and patient.    ALEXYS Kee, FARHAD  7A   Ph: [phone]  Pager: [phone]

## 2019-12-09 NOTE — PLAN OF CARE
[id number]  /78 (BP Location: Right arm)   Pulse 86   Temp 100.1  F (37.8  C) (Oral)   Resp 22   Wt 77.5 kg (170 lb 14.4 oz)   SpO2 94%   BMI 25.24 kg/m      Pt had T-max of 100.1. OVSS on RA. Pt c/o body ache and headache, PRN Tramadol 25 mg and pt has been sleeping since. Will f/up. Pt also c/o sore throat and feeling sweaty. BG was 172 at 0200 and 204 at the time when he was c/o these symptoms. Provider was notified as pt was anxious about his fever, even though he did have work-up for that yesterday morning, there was no new order. Denied nausea this shift. Pt did c/o being full at the beginning of the shift, had PRN Simethicone and was effective. On regular diet but poor appetite. Cycled feeding tube (6P-6A) through NJ running at a goal of 60 ml/hr with 20 ml of flush Q 1 hour. L PIV Sl'd and patent. Clamshell incision approximated with sterile-strips & CDI. SBA using the IV pole for mobility. Last BM yesterday, voiding well, uses urinal.  Possibly discharging to TCU today, Will continue to monitor and follow plan of care.

## 2019-12-09 NOTE — PROGRESS NOTES
"IP Diabetes Management  Daily Note           Assessment and Plan:   HPI: Mr. Miller Workman is a 53 yo man with a history of type 2 diabetes complicated by peripheral neuropathy and retinopathy, cirrhosis secondary to ESTRADA, HCC, HTN, HLD, GERD, BPH, who is s/p DBD liver transplant on 11/11/19, who was readmitted 12/2/19 with acute renal failure, confusion, and elevated tacrolimus level.     Assessment:   1)Type II Diabetes Mellitus  2)Enteral feed hyperglycemia       BG still trending up at end of TF cycle    Plan:   -Lantus (sub for tresiba) 14 units daily  1:10g CHO  MDSSI AC, HS, 0200  NPH 20 units at start of cycled tube feeds  Add aspart 2 units at 0200, to counter end-of-cycle hyperglycemia    Outpatient follow up: with [facility]  Plan discussed with patient., RN, paged to primary, RD          Interval History:  Miller has questions about insulin matching carbohydrates.  Also wonders about ambulatory insulin pumps.  Asking about calories needed to be eaten to get rid of TF (1440 kcal, 70 grams protein).  RN states he needs much prompting, guidance to eat/drink.  Miller says trying real food, then using supps as backup  Hopeful for TCU ad;mission this afternoon.  RN will try to send bag of feed, so can start on time this evening (1800)  Discussed reconciling needs and home meds as he nears discharge, ie may not be carb counting at home.    Current nutritional intake and type: Orders Placed This Encounter      Regular Diet Adult    PTA Diabetes Regimen:   Accucheck [employer] Expert for glucose monitoring and calculating aspart doses-(settings input into meter for calculation, he only has to enter his BG and CHO intake). Settings: \"meal rise\"- 50mg/dL. Snack size-10g, active insulin time 3 hrs.     Current regimen:   -Lantus (sub for tresiba) 14 units daily  1:10g CHO  MDSSI AC, HS, 0200  NPH 20 units with cycled tube feeds  Held since transplant:   Metformin ER 500mg with supper  Farxiga 10mg daily    Discharge " Planning: unknown, anticipating discharge to TCU today        Diabetes History:   Type of Diabetes: Type 2 Diabetes Mellitus, TPN/Enteral Feeding Induced Hyperglycemia  Lab Results   Component Value Date    A1C 6.6 08/08/2018    A1C 6.5 06/09/2017    A1C 7.8 10/25/2016              Review of Systems:   The Review of Systems is negative other than noted in the Interval History.           Medications:     Current Facility-Administered Medications   Medication     0.9% sodium chloride BOLUS     alpha-lipoic acid capsule 600 mg     aspirin (ASA) EC tablet 325 mg     carvedilol (COREG) tablet 12.5 mg     cyanocobalamin (VITAMIN B-12) tablet 1,000 mcg     dextrose 10 % 1,000 mL infusion     dextrose 10% infusion     glucose gel 15-30 g    Or     dextrose 50 % injection 25-50 mL    Or     glucagon injection 1 mg     ferrous sulfate (FEROSUL) tablet 325 mg     hypromellose-dextran (ARTIFICAL TEARS) 0.1-0.3 % ophthalmic solution 1 drop     insulin 1 unit/mL in saline (NovoLIN, HumuLIN Regular) drip - ADULT IV Infusion     insulin aspart (NovoLOG) inj (RAPID ACTING)     insulin aspart (NovoLOG) inj (RAPID ACTING)     insulin aspart (NovoLOG) inj (RAPID ACTING)     insulin aspart (NovoLOG) inj (RAPID ACTING)     insulin aspart (NovoLOG) inj (RAPID ACTING)     insulin glargine (LANTUS PEN) injection 14 Units     insulin isophane human (HumuLIN N PEN) injection 20 Units     Lidocaine (LIDOCARE) 4 % Patch 1 patch     lidocaine (LMX4) cream     lidocaine 1 % 1 mL     lidocaine patch in PLACE     lidocaine patch REMOVAL     magnesium oxide (MAG-OX) tablet 400 mg     mirtazapine (REMERON) tablet 15 mg     mycophenolic acid (GENERIC EQUIVALENT) EC tablet 540 mg     naloxone (NARCAN) injection 0.1-0.4 mg     omeprazole (priLOSEC) CR capsule 40 mg     ondansetron (ZOFRAN) tablet 4 mg     ondansetron (ZOFRAN-ODT) ODT tab 4 mg     phosphorus tablet 250 mg (PHOSPHA 250 NEUTRAL) per tablet 250 mg     prenatal multivitamin w/iron per tablet  1 tablet     prochlorperazine (COMPAZINE) tablet 5 mg     rosuvastatin (CRESTOR) tablet 5 mg     simethicone (MYLICON) chewable tablet 80 mg     sodium bicarbonate tablet 650 mg     sodium chloride (OCEAN) 0.65 % nasal spray 1 spray     sodium chloride (PF) 0.9% PF flush 3 mL     sodium chloride (PF) 0.9% PF flush 3 mL     sulfamethoxazole-trimethoprim (BACTRIM/SEPTRA) 400-80 MG per tablet 1 tablet     tacrolimus (GENERIC EQUIVALENT) capsule 3.5 mg     tamsulosin (FLOMAX) capsule 0.4 mg     tenofovir (VIREAD) tablet 300 mg     traMADol (ULTRAM) half-tab 25 mg     valGANciclovir (VALCYTE) tablet 450 mg     Vitamin D3 (CHOLECALCIFEROL) 25 mcg (1000 units) tablet 2,000 Units            Physical Exam:    /73   Pulse 86   Temp 97.9  F (36.6  C) (Oral)   Resp 20   Wt 77.5 kg (170 lb 14.4 oz)   SpO2 98%   BMI 25.24 kg/m    General: pleasant, in no distress, resting in bed  HEENT NC/AT: NGT bridled, in place.   Lungs: unlabored respiration, no cough  ABD: rounded  Skin: warm and dry, no obvious lesions  MSK:  moves all extremities  Lymp:  Mental status: awake, alert, communicating clearly, asking questions  Psych: normal affect, calm and appropriate interaction.             Data:     Recent Labs   Lab 12/09/19  1220 12/09/19  0853 12/09/19  0620 12/09/19  0440 12/09/19  0204 12/08/19  2221 12/08/19  1858  12/08/19  0522  12/07/19  0555  12/06/19  0552  12/05/19  0605  12/04/19  0446   GLC  --   --  232*  --   --   --   --   --  205*  --  203*  --  134*  --  226*  --  181*   * 234*  --  204* 172* 132* 121*   < >  --    < >  --    < >  --    < >  --    < >  --     < > = values in this interval not displayed.       Recent Labs   Lab Test 12/09/19  0620 12/08/19  0522    142   POTASSIUM 4.2 4.0   CHLORIDE 110* 114*   CO2 22 20   ANIONGAP 6 8   * 205*   BUN 30 28   CR 1.58* 1.53*   DEBORAH 8.7 8.2*     CBC RESULTS:   Recent Labs   Lab Test 12/09/19  0620   WBC 4.6   RBC 2.34*   HGB 7.1*   HCT 21.7*    MCV 93   MCH 30.3   MCHC 32.7   RDW 17.3*   PLT 78*     Wendy Levy APRN [facility] [phone]  Diabetes Management Team job code: [id number]  I spent a total of 35 minutes bedside and on the inpatient unit managing the glycemic care of Frandy Workman. Over 50% of my time on the unit was spent counseling the patient  and/or coordinating care regarding acute and future BG managment.  See note for details.

## 2019-12-09 NOTE — PROGRESS NOTES
Immunosuppression Note:    Frandy Workman is a 54 year old male who is seen today  for immunosuppression management     I, Yonathan Chino MD, I have examined the patient with the resident/PA/Fellow, discussed and agree with the note and findings.  I have reviewed today's vital signs, medications, labs and imaging. I reviewed the immunosuppression medications and levels. I spoke to the patient/family and explained below clinical details and answered all the questions      Transplant Surgery  Inpatient Daily Progress Note  12/09/2019    Assessment & Plan: Frandy Workman is a 54 year old male with PMHx significant for cirrhosis secondary to ESTRADA diagnosed in 2013, HCC 2018, HTN, HLD, GERD, BPH and DMII. S/p liver transplant 11/11/19 complicated by hematoma evacuation on POD 1. Now admitted with JERONIMO, nausea, diarrhea, confusion and weakness.     Graft function:   S/p liver transplant: DD OLT 11/11/19. LFTs stable. 12/4 US liver d/t RUQ tenderness revealed multiple small fluid collections around liver; patent vasculature.   Immunosuppression management:   CC: Myfortic 540 mg BID, changed from Cellcept on 12/2 due to nausea.  FK: Tacrolimus 4 mg BID; goal 10-12 (12 hr trough). Level 12/9= 6.3 (12 hr trough), increase prograf to 4 mg BID.  Complexity of management: Medium. Contributing factors: organ dysfunction and confusion  Hematology:   Normocytic Anemia: Hgb stable at 7.1. Received 1unit PRBC on 12/4 & 12/5. Hemoccult stool positive. % Retic 1.5. . EGD and Colonoscopy revealed gastritis and ileitis. Pathology pending.   Cardiorespiratory:   Hypertension: PTA Coreg held on admission due to hypotension. Restarted Coreg 12.5mg BID.  Shortness of Breath: CXR: New small right pleural effusion with associated right lower lung opacities, which likely represent atelectasis versus consolidation. CIV. Lasix 20mg x1, CDB/IS   GI/Nutrition:   Severe malnutrition in the context of acute on chronic illness: Minimal  intake outpatient due to confusion, no appetite. NJ placed for TF initiation. RD consulted.  TF now cycled. Restart calorie counts  Endocrine:   Type II diabetes mellitus: PTA was on Tresiba, carb coverage and sliding scale insulin. Hypoglycemia at home due to poor PO intake. Sliding scale insulin and carb coverage restarted. HoldingTresiba at this time. Endocrinology consulted and started Lantus 14 units daily and NPH 20 every evening with tube feeds.   Fluid/Electrolytes:   JERONIMO: Likely r/t dehydration/hypovolemia. SCr 3.2 on admission, improving to 1.6 today  Hyperkalemia: D/t tacro, acidosis, JERONIMO on admission-responded to Kayexalate. 4.2 today.   Hyperchloremia: improved with change in IVF. Cl 110 today.  Added free water to TF. Oral fluid intake is good.   Hypernatremia: Na 138. Continue free water.  Low bicarb: Due to JERONIMO and diarrhea. CO2 improved to 22 with BID bicarb.   Hypomagnesemia: mag 1.7, continue PO supplement  Hypophosphatemia: improved, Phos 2.6  : No acute issues  Infectious disease: Low grade temp, Tmax 100.1F; WBC 4.6. UA unremarkable. CMV 12/2 not detected.   Diarrhea: Worsening diarrhea prior to admission. C. Diff, enteric viral panel, and rotavirus negative. Fecal lactoferrin positive. Diarrhea improving.  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   Neuro:   Confusion: Patient intermittently confused at home. MOCA indicating cognitive impairment. No confusion today.  Anxiety: Health psychology and psych consulted. Remeron initiated at bedtime per psych recommendations. Health psychology to see patient weekly while inpatient.  Prophylaxis: DVT (mechanical), fall, GI (protonix), viral (Valcyte), pneumocystis (Bactrim)  Disposition: 7A; PT/OT evaluating. Medically ready since 12/5, awaiting TCU transfer.     Medical Decision Making: Medium  Subsequent visit 71667 (moderate level decision making)    SERA/Fellow/Resident Provider:  Maryanne Recio PA-C     Faculty: Yonathan Chino M.D.    __________________________________________________________________  Transplant History:    11/11/2019 (Liver), Postoperative day: 28     Interval History: History is obtained from the patient  No complaints this AM. Denies SOB or nausea.     ROS:   A 10-point review of systems was negative except as noted above.    Curent Meds:    alpha-lipoic acid  600 mg Oral Daily     aspirin  325 mg Oral Daily     carvedilol  12.5 mg Oral BID w/meals     cyanocobalamin  1,000 mcg Oral Daily     ferrous sulfate  325 mg Oral TID w/meals     insulin aspart  2 Units Subcutaneous Q24H     insulin aspart  1-5 Units Subcutaneous BID     insulin aspart  1-7 Units Subcutaneous TID AC     insulin aspart   Subcutaneous TID w/meals     insulin glargine  14 Units Subcutaneous QAM     insulin isophane human  20 Units Subcutaneous QPM     lidocaine   Transdermal Q8H     magnesium oxide  400 mg Oral BID     mirtazapine  15 mg Oral At Bedtime     mycophenolic acid  540 mg Oral Q12H     omeprazole  40 mg Oral Daily     phosphorus tablet 250 mg  250 mg Oral BID     prenatal multivitamin w/iron  1 tablet Oral Daily     rosuvastatin  5 mg Oral Daily     sodium bicarbonate  650 mg Oral BID     sodium chloride (PF)  3 mL Intracatheter Q8H     sulfamethoxazole-trimethoprim  1 tablet Oral Daily     tacrolimus  4 mg Oral BID     tamsulosin  0.4 mg Oral Daily     tenofovir  300 mg Oral Daily     valGANciclovir  450 mg Oral Daily     vitamin D3  2,000 Units Oral Daily       Physical Exam:     Admit Weight: 73.8 kg (162 lb 11.2 oz)    Current Vitals:   /73   Pulse 86   Temp 97.9  F (36.6  C) (Oral)   Resp 20   Wt 77.5 kg (170 lb 14.4 oz)   SpO2 98%   BMI 25.24 kg/m      Vital sign ranges:    Temp:  [97.4  F (36.3  C)-100.1  F (37.8  C)] 97.9  F (36.6  C)  Pulse:  [86] 86  Heart Rate:  [] 83  Resp:  [18-22] 20  BP: (117-145)/(69-78) 117/73  SpO2:  [94 %-100 %] 98 %    General  Appearance: in no apparent distress.   Skin: normal, warm, dry, No rashes, induration, or jaundice.  Heart: RRR  Lungs: non-labored breathing on RA  Abdomen: Incision c/d/i. Abdomen flat. Mildly tender RUQ.  : Carroll is not present.  Extremities: edema: none  Neurologic: alert, oriented x4. Tremor absent.     Data:   CMP  Recent Labs   Lab 12/09/19 0620 12/08/19 0522 12/07/19  0555  12/05/19  0605    142 142   < > 143   POTASSIUM 4.2 4.0 4.2   < > 4.7   CHLORIDE 110* 114* 116*   < > 119*   CO2 22 20 20   < > 15*   * 205* 203*   < > 226*   BUN 30 28 31*   < > 49*   CR 1.58* 1.53* 1.68*   < > 2.10*   GFRESTIMATED 48* 50* 45*   < > 34*   GFRESTBLACK 56* 58* 52*   < > 40*   DEBORAH 8.7 8.2* 8.3*   < > 8.5   MAG 1.7 1.5* 1.3*   < > 1.6   PHOS  --  2.6 2.0*   < >  --    LIPASE  --   --   --   --  70*   ALBUMIN 2.3* 2.2* 2.3*   < > 2.3*   BILITOTAL 0.2 0.5 0.4   < > 0.6   ALKPHOS 155* 142 145   < > 164*   AST 16 22 18   < > 15   ALT 28 24 22   < > 21    < > = values in this interval not displayed.     CBC  Recent Labs   Lab 12/09/19 0620 12/08/19 0522   HGB 7.1* 7.2*   WBC 4.6 4.3   PLT 78* 79*

## 2019-12-09 NOTE — PLAN OF CARE
Discharge Planner PT   Patient plan for discharge: TCU  Current status: Pt amb ~ 300 feet x 2 with SPC and CGA, pt with poor balance, ambulates with minimal path deviation, unsteady at times. Pt performed basic transfers with SBA->CGA. Pt performed standing LE Therex, but with poor balance during single leg balance activities.   Barriers to return to prior living situation: impaired balance, decreased strength, activity intolerance, impaired endurance  Recommendations for discharge: TCU  Rationale for recommendations: Pt would benefit from additional skilled PT to address functional mobility, transfers, gait, balance and safety.       Entered by: Wendy Huntley 12/09/2019 9:33 AM

## 2019-12-10 ENCOUNTER — HOSPITAL ENCOUNTER (INPATIENT)
Facility: SKILLED NURSING FACILITY | Age: 54
LOS: 8 days | Discharge: HOME HEALTH | DRG: 947 | End: 2019-12-18
Attending: HOSPITALIST | Admitting: HOSPITALIST
Payer: MEDICARE

## 2019-12-10 ENCOUNTER — APPOINTMENT (OUTPATIENT)
Dept: PHYSICAL THERAPY | Facility: CLINIC | Age: 54
DRG: 682 | End: 2019-12-10
Attending: TRANSPLANT SURGERY
Payer: MEDICARE

## 2019-12-10 VITALS
BODY MASS INDEX: 24.97 KG/M2 | TEMPERATURE: 98.4 F | SYSTOLIC BLOOD PRESSURE: 120 MMHG | DIASTOLIC BLOOD PRESSURE: 71 MMHG | HEART RATE: 89 BPM | RESPIRATION RATE: 16 BRPM | WEIGHT: 169.1 LBS | OXYGEN SATURATION: 95 %

## 2019-12-10 DIAGNOSIS — M21.969 TYPE 2 DIABETES MELLITUS WITH DIABETIC FOOT DEFORMITY (H): ICD-10-CM

## 2019-12-10 DIAGNOSIS — Z79.4 TYPE 2 DIABETES MELLITUS WITH OTHER SPECIFIED COMPLICATION, WITH LONG-TERM CURRENT USE OF INSULIN (H): ICD-10-CM

## 2019-12-10 DIAGNOSIS — Z94.4 LIVER TRANSPLANT RECIPIENT (H): Primary | ICD-10-CM

## 2019-12-10 DIAGNOSIS — E11.69 TYPE 2 DIABETES MELLITUS WITH OTHER SPECIFIED COMPLICATION, WITH LONG-TERM CURRENT USE OF INSULIN (H): ICD-10-CM

## 2019-12-10 DIAGNOSIS — E11.3293 TYPE 2 DIABETES MELLITUS WITH MILD NONPROLIFERATIVE RETINOPATHY OF BOTH EYES WITHOUT MACULAR EDEMA, UNSPECIFIED WHETHER LONG TERM INSULIN USE (H): ICD-10-CM

## 2019-12-10 DIAGNOSIS — E11.69 TYPE 2 DIABETES MELLITUS WITH DIABETIC FOOT DEFORMITY (H): ICD-10-CM

## 2019-12-10 DIAGNOSIS — N40.1 BENIGN PROSTATIC HYPERPLASIA WITH LOWER URINARY TRACT SYMPTOMS, SYMPTOM DETAILS UNSPECIFIED: ICD-10-CM

## 2019-12-10 DIAGNOSIS — K21.9 GASTROESOPHAGEAL REFLUX DISEASE, ESOPHAGITIS PRESENCE NOT SPECIFIED: ICD-10-CM

## 2019-12-10 DIAGNOSIS — N17.9 AKI (ACUTE KIDNEY INJURY) (H): ICD-10-CM

## 2019-12-10 DIAGNOSIS — K74.60 LIVER CIRRHOSIS SECONDARY TO NASH (H): ICD-10-CM

## 2019-12-10 DIAGNOSIS — K75.81 LIVER CIRRHOSIS SECONDARY TO NASH (H): ICD-10-CM

## 2019-12-10 DIAGNOSIS — H04.123 DRY EYES: ICD-10-CM

## 2019-12-10 DIAGNOSIS — E78.5 HYPERLIPIDEMIA, UNSPECIFIED HYPERLIPIDEMIA TYPE: ICD-10-CM

## 2019-12-10 LAB
ALBUMIN SERPL-MCNC: 2.4 G/DL (ref 3.4–5)
ALP SERPL-CCNC: 162 U/L (ref 40–150)
ALT SERPL W P-5'-P-CCNC: 29 U/L (ref 0–70)
ANION GAP SERPL CALCULATED.3IONS-SCNC: 6 MMOL/L (ref 3–14)
AST SERPL W P-5'-P-CCNC: 17 U/L (ref 0–45)
BILIRUB DIRECT SERPL-MCNC: 0.1 MG/DL (ref 0–0.2)
BILIRUB SERPL-MCNC: 0.2 MG/DL (ref 0.2–1.3)
BUN SERPL-MCNC: 34 MG/DL (ref 7–30)
CALCIUM SERPL-MCNC: 8.6 MG/DL (ref 8.5–10.1)
CHLORIDE SERPL-SCNC: 107 MMOL/L (ref 94–109)
CO2 SERPL-SCNC: 24 MMOL/L (ref 20–32)
COPATH REPORT: NORMAL
CREAT SERPL-MCNC: 1.52 MG/DL (ref 0.66–1.25)
ERYTHROCYTE [DISTWIDTH] IN BLOOD BY AUTOMATED COUNT: 17.2 % (ref 10–15)
GFR SERPL CREATININE-BSD FRML MDRD: 51 ML/MIN/{1.73_M2}
GLUCOSE BLDC GLUCOMTR-MCNC: 106 MG/DL (ref 70–99)
GLUCOSE BLDC GLUCOMTR-MCNC: 152 MG/DL (ref 70–99)
GLUCOSE BLDC GLUCOMTR-MCNC: 163 MG/DL (ref 70–99)
GLUCOSE BLDC GLUCOMTR-MCNC: 184 MG/DL (ref 70–99)
GLUCOSE BLDC GLUCOMTR-MCNC: 233 MG/DL (ref 70–99)
GLUCOSE BLDC GLUCOMTR-MCNC: 90 MG/DL (ref 70–99)
GLUCOSE SERPL-MCNC: 203 MG/DL (ref 70–99)
HCT VFR BLD AUTO: 21.5 % (ref 40–53)
HGB BLD-MCNC: 7.1 G/DL (ref 13.3–17.7)
MAGNESIUM SERPL-MCNC: 1.6 MG/DL (ref 1.6–2.3)
MCH RBC QN AUTO: 30.2 PG (ref 26.5–33)
MCHC RBC AUTO-ENTMCNC: 33 G/DL (ref 31.5–36.5)
MCV RBC AUTO: 92 FL (ref 78–100)
PHOSPHATE SERPL-MCNC: 2.2 MG/DL (ref 2.5–4.5)
PLATELET # BLD AUTO: 79 10E9/L (ref 150–450)
POTASSIUM SERPL-SCNC: 4.2 MMOL/L (ref 3.4–5.3)
PROT SERPL-MCNC: 6 G/DL (ref 6.8–8.8)
RBC # BLD AUTO: 2.35 10E12/L (ref 4.4–5.9)
SODIUM SERPL-SCNC: 138 MMOL/L (ref 133–144)
TACROLIMUS BLD-MCNC: 7.9 UG/L (ref 5–15)
TME LAST DOSE: NORMAL H
WBC # BLD AUTO: 5 10E9/L (ref 4–11)

## 2019-12-10 PROCEDURE — 25000132 ZZH RX MED GY IP 250 OP 250 PS 637: Mod: GY | Performed by: PHYSICIAN ASSISTANT

## 2019-12-10 PROCEDURE — 27210432 ZZH NUTRITION PRODUCT RENAL BASIC LITER

## 2019-12-10 PROCEDURE — 80048 BASIC METABOLIC PNL TOTAL CA: CPT | Performed by: PHYSICIAN ASSISTANT

## 2019-12-10 PROCEDURE — 97110 THERAPEUTIC EXERCISES: CPT | Mod: GP | Performed by: REHABILITATION PRACTITIONER

## 2019-12-10 PROCEDURE — 97116 GAIT TRAINING THERAPY: CPT | Mod: GP | Performed by: REHABILITATION PRACTITIONER

## 2019-12-10 PROCEDURE — 80197 ASSAY OF TACROLIMUS: CPT | Performed by: NURSE PRACTITIONER

## 2019-12-10 PROCEDURE — 25000132 ZZH RX MED GY IP 250 OP 250 PS 637: Mod: GY

## 2019-12-10 PROCEDURE — 12000022 ZZH R&B SNF

## 2019-12-10 PROCEDURE — 83735 ASSAY OF MAGNESIUM: CPT | Performed by: PHYSICIAN ASSISTANT

## 2019-12-10 PROCEDURE — 80076 HEPATIC FUNCTION PANEL: CPT | Performed by: PHYSICIAN ASSISTANT

## 2019-12-10 PROCEDURE — 85027 COMPLETE CBC AUTOMATED: CPT | Performed by: PHYSICIAN ASSISTANT

## 2019-12-10 PROCEDURE — 25000131 ZZH RX MED GY IP 250 OP 636 PS 637: Mod: GY | Performed by: PHYSICIAN ASSISTANT

## 2019-12-10 PROCEDURE — 97530 THERAPEUTIC ACTIVITIES: CPT | Mod: GP | Performed by: REHABILITATION PRACTITIONER

## 2019-12-10 PROCEDURE — 36415 COLL VENOUS BLD VENIPUNCTURE: CPT | Performed by: PHYSICIAN ASSISTANT

## 2019-12-10 PROCEDURE — 25000132 ZZH RX MED GY IP 250 OP 250 PS 637: Mod: GY | Performed by: NURSE PRACTITIONER

## 2019-12-10 PROCEDURE — 87493 C DIFF AMPLIFIED PROBE: CPT | Performed by: NURSE PRACTITIONER

## 2019-12-10 PROCEDURE — 00000146 ZZHCL STATISTIC GLUCOSE BY METER IP

## 2019-12-10 PROCEDURE — 84100 ASSAY OF PHOSPHORUS: CPT | Performed by: PHYSICIAN ASSISTANT

## 2019-12-10 PROCEDURE — 25000132 ZZH RX MED GY IP 250 OP 250 PS 637: Mod: GY | Performed by: TRANSPLANT SURGERY

## 2019-12-10 PROCEDURE — 25000131 ZZH RX MED GY IP 250 OP 636 PS 637: Mod: GY | Performed by: INTERNAL MEDICINE

## 2019-12-10 PROCEDURE — 25000131 ZZH RX MED GY IP 250 OP 636 PS 637: Mod: GY | Performed by: NURSE PRACTITIONER

## 2019-12-10 RX ORDER — TENOFOVIR DISOPROXIL FUMARATE 300 MG/1
300 TABLET, FILM COATED ORAL DAILY
Status: DISCONTINUED | OUTPATIENT
Start: 2019-12-11 | End: 2019-12-18 | Stop reason: HOSPADM

## 2019-12-10 RX ORDER — SULFAMETHOXAZOLE AND TRIMETHOPRIM 400; 80 MG/1; MG/1
1 TABLET ORAL DAILY
Status: DISCONTINUED | OUTPATIENT
Start: 2019-12-11 | End: 2019-12-18 | Stop reason: HOSPADM

## 2019-12-10 RX ORDER — ACETAMINOPHEN 650 MG/1
650 SUPPOSITORY RECTAL EVERY 4 HOURS PRN
Status: DISCONTINUED | OUTPATIENT
Start: 2019-12-10 | End: 2019-12-18 | Stop reason: HOSPADM

## 2019-12-10 RX ORDER — TACROLIMUS 1 MG/1
4 CAPSULE ORAL 2 TIMES DAILY
Status: DISCONTINUED | OUTPATIENT
Start: 2019-12-10 | End: 2019-12-12

## 2019-12-10 RX ORDER — LIDOCAINE 4 G/G
1 PATCH TOPICAL EVERY 24 HOURS
Status: DISCONTINUED | OUTPATIENT
Start: 2019-12-11 | End: 2019-12-11

## 2019-12-10 RX ORDER — SIMETHICONE 80 MG
80 TABLET,CHEWABLE ORAL EVERY 6 HOURS PRN
Status: DISCONTINUED | OUTPATIENT
Start: 2019-12-10 | End: 2019-12-18 | Stop reason: HOSPADM

## 2019-12-10 RX ORDER — ONDANSETRON 4 MG/1
4 TABLET, ORALLY DISINTEGRATING ORAL EVERY 6 HOURS PRN
Status: DISCONTINUED | OUTPATIENT
Start: 2019-12-10 | End: 2019-12-18 | Stop reason: HOSPADM

## 2019-12-10 RX ORDER — DEXTROSE MONOHYDRATE 25 G/50ML
25-50 INJECTION, SOLUTION INTRAVENOUS
Status: DISCONTINUED | OUTPATIENT
Start: 2019-12-10 | End: 2019-12-10

## 2019-12-10 RX ORDER — ONDANSETRON 2 MG/ML
4 INJECTION INTRAMUSCULAR; INTRAVENOUS EVERY 6 HOURS PRN
Status: DISCONTINUED | OUTPATIENT
Start: 2019-12-10 | End: 2019-12-18 | Stop reason: HOSPADM

## 2019-12-10 RX ORDER — ROSUVASTATIN CALCIUM 5 MG/1
5 TABLET, COATED ORAL AT BEDTIME
Status: DISCONTINUED | OUTPATIENT
Start: 2019-12-10 | End: 2019-12-18 | Stop reason: HOSPADM

## 2019-12-10 RX ORDER — PRENATAL VIT/IRON FUM/FOLIC AC 27MG-0.8MG
1 TABLET ORAL DAILY
Qty: 90 TABLET | Refills: 3 | Status: ON HOLD | DISCHARGE
Start: 2019-12-11 | End: 2019-12-17

## 2019-12-10 RX ORDER — LIDOCAINE 4 G/G
1 PATCH TOPICAL EVERY 24 HOURS
DISCHARGE
Start: 2019-12-10 | End: 2020-01-13

## 2019-12-10 RX ORDER — MIRTAZAPINE 15 MG/1
15 TABLET, FILM COATED ORAL AT BEDTIME
Status: ON HOLD | DISCHARGE
Start: 2019-12-10 | End: 2019-12-17

## 2019-12-10 RX ORDER — SODIUM BICARBONATE 650 MG/1
650 TABLET ORAL 2 TIMES DAILY
Status: DISCONTINUED | OUTPATIENT
Start: 2019-12-10 | End: 2019-12-11

## 2019-12-10 RX ORDER — MAGNESIUM OXIDE 400 MG/1
400 TABLET ORAL 2 TIMES DAILY
Status: ON HOLD | DISCHARGE
Start: 2019-12-10 | End: 2019-12-17

## 2019-12-10 RX ORDER — NICOTINE POLACRILEX 4 MG
15-30 LOZENGE BUCCAL
Status: DISCONTINUED | OUTPATIENT
Start: 2019-12-10 | End: 2019-12-18 | Stop reason: HOSPADM

## 2019-12-10 RX ORDER — MIRTAZAPINE 7.5 MG/1
15 TABLET, FILM COATED ORAL AT BEDTIME
Status: DISCONTINUED | OUTPATIENT
Start: 2019-12-10 | End: 2019-12-11

## 2019-12-10 RX ORDER — TAMSULOSIN HYDROCHLORIDE 0.4 MG/1
0.4 CAPSULE ORAL EVERY EVENING
Status: DISCONTINUED | OUTPATIENT
Start: 2019-12-10 | End: 2019-12-11

## 2019-12-10 RX ORDER — ONDANSETRON 4 MG/1
4 TABLET, FILM COATED ORAL EVERY 6 HOURS PRN
Status: ON HOLD | DISCHARGE
Start: 2019-12-10 | End: 2019-12-18

## 2019-12-10 RX ORDER — MYCOPHENOLIC ACID 180 MG/1
540 TABLET, DELAYED RELEASE ORAL 2 TIMES DAILY
Status: DISCONTINUED | OUTPATIENT
Start: 2019-12-10 | End: 2019-12-11

## 2019-12-10 RX ORDER — ACETAMINOPHEN 325 MG/1
650 TABLET ORAL EVERY 4 HOURS PRN
Status: DISCONTINUED | OUTPATIENT
Start: 2019-12-10 | End: 2019-12-18 | Stop reason: HOSPADM

## 2019-12-10 RX ORDER — NICOTINE POLACRILEX 4 MG
15-30 LOZENGE BUCCAL
Status: DISCONTINUED | OUTPATIENT
Start: 2019-12-10 | End: 2019-12-10

## 2019-12-10 RX ORDER — PROCHLORPERAZINE MALEATE 5 MG
5 TABLET ORAL EVERY 6 HOURS PRN
Status: DISCONTINUED | OUTPATIENT
Start: 2019-12-10 | End: 2019-12-18 | Stop reason: HOSPADM

## 2019-12-10 RX ORDER — MULTIVITAMIN,THERAPEUTIC
1 TABLET ORAL DAILY
Status: DISCONTINUED | OUTPATIENT
Start: 2019-12-11 | End: 2019-12-11

## 2019-12-10 RX ORDER — VALGANCICLOVIR 450 MG/1
450 TABLET, FILM COATED ORAL DAILY
Status: DISCONTINUED | OUTPATIENT
Start: 2019-12-11 | End: 2019-12-18 | Stop reason: HOSPADM

## 2019-12-10 RX ORDER — SODIUM BICARBONATE 650 MG/1
650 TABLET ORAL 2 TIMES DAILY
Status: ON HOLD | DISCHARGE
Start: 2019-12-10 | End: 2019-12-17

## 2019-12-10 RX ORDER — AZATHIOPRINE 50 MG/1
100 TABLET ORAL DAILY
Status: DISCONTINUED | OUTPATIENT
Start: 2019-12-11 | End: 2019-12-11

## 2019-12-10 RX ORDER — MICONAZOLE NITRATE 20 MG/G
CREAM TOPICAL 2 TIMES DAILY
Status: DISCONTINUED | OUTPATIENT
Start: 2019-12-10 | End: 2019-12-18 | Stop reason: HOSPADM

## 2019-12-10 RX ORDER — CARVEDILOL 3.12 MG/1
12.5 TABLET ORAL 2 TIMES DAILY WITH MEALS
Status: DISCONTINUED | OUTPATIENT
Start: 2019-12-10 | End: 2019-12-18 | Stop reason: HOSPADM

## 2019-12-10 RX ORDER — VITAMIN B COMPLEX
2000 TABLET ORAL DAILY
Status: DISCONTINUED | OUTPATIENT
Start: 2019-12-11 | End: 2019-12-11

## 2019-12-10 RX ORDER — GUAR GUM
1 PACKET (EA) ORAL 2 TIMES DAILY
Status: DISCONTINUED | OUTPATIENT
Start: 2019-12-10 | End: 2019-12-17

## 2019-12-10 RX ORDER — SIMETHICONE 80 MG
80 TABLET,CHEWABLE ORAL EVERY 6 HOURS PRN
DISCHARGE
Start: 2019-12-10 | End: 2020-01-13

## 2019-12-10 RX ORDER — MAGNESIUM OXIDE 400 MG/1
400 TABLET ORAL 2 TIMES DAILY
Status: DISCONTINUED | OUTPATIENT
Start: 2019-12-10 | End: 2019-12-18 | Stop reason: HOSPADM

## 2019-12-10 RX ORDER — PROCHLORPERAZINE MALEATE 5 MG
5 TABLET ORAL EVERY 6 HOURS PRN
DISCHARGE
Start: 2019-12-10 | End: 2020-02-11

## 2019-12-10 RX ORDER — FERROUS SULFATE 325(65) MG
325 TABLET ORAL
Status: DISCONTINUED | OUTPATIENT
Start: 2019-12-10 | End: 2019-12-18 | Stop reason: HOSPADM

## 2019-12-10 RX ORDER — LANOLIN ALCOHOL/MO/W.PET/CERES
1000 CREAM (GRAM) TOPICAL DAILY
Status: DISCONTINUED | OUTPATIENT
Start: 2019-12-11 | End: 2019-12-11

## 2019-12-10 RX ORDER — PRENATAL VIT/IRON FUM/FOLIC AC 27MG-0.8MG
1 TABLET ORAL DAILY
Status: DISCONTINUED | OUTPATIENT
Start: 2019-12-11 | End: 2019-12-18 | Stop reason: HOSPADM

## 2019-12-10 RX ORDER — TACROLIMUS 1 MG/1
4 CAPSULE ORAL 2 TIMES DAILY
Status: ON HOLD | DISCHARGE
Start: 2019-12-10 | End: 2019-12-17

## 2019-12-10 RX ORDER — DEXTROSE MONOHYDRATE 25 G/50ML
25-50 INJECTION, SOLUTION INTRAVENOUS
Status: DISCONTINUED | OUTPATIENT
Start: 2019-12-10 | End: 2019-12-18 | Stop reason: HOSPADM

## 2019-12-10 RX ADMIN — MIRTAZAPINE 15 MG: 7.5 TABLET, FILM COATED ORAL at 21:41

## 2019-12-10 RX ADMIN — SULFAMETHOXAZOLE AND TRIMETHOPRIM 1 TABLET: 400; 80 TABLET ORAL at 08:12

## 2019-12-10 RX ADMIN — VALGANCICLOVIR 450 MG: 450 TABLET, FILM COATED ORAL at 08:13

## 2019-12-10 RX ADMIN — MYCOPHENOLIC ACID 540 MG: 180 TABLET, DELAYED RELEASE ORAL at 21:41

## 2019-12-10 RX ADMIN — INSULIN ASPART 1 UNITS: 100 INJECTION, SOLUTION INTRAVENOUS; SUBCUTANEOUS at 09:37

## 2019-12-10 RX ADMIN — TAMSULOSIN HYDROCHLORIDE 0.4 MG: 0.4 CAPSULE ORAL at 20:24

## 2019-12-10 RX ADMIN — OMEPRAZOLE 40 MG: 20 CAPSULE, DELAYED RELEASE ORAL at 08:14

## 2019-12-10 RX ADMIN — Medication 400 MG: at 12:46

## 2019-12-10 RX ADMIN — DIBASIC SODIUM PHOSPHATE, MONOBASIC POTASSIUM PHOSPHATE AND MONOBASIC SODIUM PHOSPHATE 250 MG: 852; 155; 130 TABLET ORAL at 08:12

## 2019-12-10 RX ADMIN — DIBASIC SODIUM PHOSPHATE, MONOBASIC POTASSIUM PHOSPHATE AND MONOBASIC SODIUM PHOSPHATE 250 MG: 852; 155; 130 TABLET ORAL at 20:24

## 2019-12-10 RX ADMIN — TACROLIMUS 4 MG: 1 CAPSULE ORAL at 08:13

## 2019-12-10 RX ADMIN — Medication 1 PACKET: at 20:24

## 2019-12-10 RX ADMIN — TENOFOVIR DISOPROXIL FUMARATE 300 MG: 300 TABLET ORAL at 08:14

## 2019-12-10 RX ADMIN — TACROLIMUS 4 MG: 1 CAPSULE ORAL at 20:22

## 2019-12-10 RX ADMIN — FERROUS SULFATE TAB 325 MG (65 MG ELEMENTAL FE) 325 MG: 325 (65 FE) TAB at 08:12

## 2019-12-10 RX ADMIN — SODIUM BICARBONATE 650 MG TABLET 650 MG: at 20:23

## 2019-12-10 RX ADMIN — MAGNESIUM OXIDE TAB 400 MG (241.3 MG ELEMENTAL MG) 400 MG: 400 (241.3 MG) TAB at 20:23

## 2019-12-10 RX ADMIN — CARVEDILOL 12.5 MG: 12.5 TABLET, FILM COATED ORAL at 08:13

## 2019-12-10 RX ADMIN — INSULIN ASPART 2 UNITS: 100 INJECTION, SOLUTION INTRAVENOUS; SUBCUTANEOUS at 12:50

## 2019-12-10 RX ADMIN — ASPIRIN 325 MG: 325 TABLET, DELAYED RELEASE ORAL at 08:14

## 2019-12-10 RX ADMIN — MYCOPHENOLIC ACID 540 MG: 360 TABLET, DELAYED RELEASE ORAL at 08:13

## 2019-12-10 RX ADMIN — SODIUM BICARBONATE 650 MG TABLET 650 MG: at 08:13

## 2019-12-10 RX ADMIN — FERROUS SULFATE TAB 325 MG (65 MG ELEMENTAL FE) 325 MG: 325 (65 FE) TAB at 18:50

## 2019-12-10 RX ADMIN — INSULIN HUMAN 20 UNITS: 100 INJECTION, SUSPENSION SUBCUTANEOUS at 20:21

## 2019-12-10 RX ADMIN — CARVEDILOL 12.5 MG: 3.12 TABLET, FILM COATED ORAL at 18:50

## 2019-12-10 RX ADMIN — ROSUVASTATIN CALCIUM 5 MG: 5 TABLET, FILM COATED ORAL at 21:41

## 2019-12-10 RX ADMIN — MICONAZOLE NITRATE: 20 CREAM TOPICAL at 20:24

## 2019-12-10 RX ADMIN — PRENATAL VIT W/ FE FUMARATE-FA TAB 27-0.8 MG 1 TABLET: 27-0.8 TAB at 08:14

## 2019-12-10 RX ADMIN — FERROUS SULFATE TAB 325 MG (65 MG ELEMENTAL FE) 325 MG: 325 (65 FE) TAB at 12:46

## 2019-12-10 ASSESSMENT — ACTIVITIES OF DAILY LIVING (ADL)
ADLS_ACUITY_SCORE: 14
TRANSFERRING: 1-->ASSISTIVE EQUIPMENT
RETIRED_COMMUNICATION: 0-->UNDERSTANDS/COMMUNICATES WITHOUT DIFFICULTY
BATHING: 3-->ASSISTIVE EQUIPMENT AND PERSON
ADLS_ACUITY_SCORE: 14
AMBULATION: 1-->ASSISTIVE EQUIPMENT
RETIRED_EATING: 0-->INDEPENDENT
SWALLOWING: 0-->SWALLOWS FOODS/LIQUIDS WITHOUT DIFFICULTY
ADLS_ACUITY_SCORE: 14
ADLS_ACUITY_SCORE: 14
DRESS: 2-->ASSISTIVE PERSON
COGNITION: 1 - ATTENTION OR MEMORY DEFICITS
WHICH_OF_THE_ABOVE_FUNCTIONAL_RISKS_HAD_A_RECENT_ONSET_OR_CHANGE?: AMBULATION;TRANSFERRING;BATHING;DRESSING
FALL_HISTORY_WITHIN_LAST_SIX_MONTHS: NO
TOILETING: 0-->INDEPENDENT

## 2019-12-10 ASSESSMENT — PAIN DESCRIPTION - DESCRIPTORS: DESCRIPTORS: ACHING

## 2019-12-10 ASSESSMENT — MIFFLIN-ST. JEOR: SCORE: 1601.03

## 2019-12-10 NOTE — PLAN OF CARE
Discharge Planner PT   Patient plan for discharge: TCU  Current status: Pt amb ~ 175 feet x 2 with CGA, pt amb up and down 4 stairs x 3 trials with single handrail and CGA. Pt performed 5 minutes on NuStep to improve functional endurance.   Barriers to return to prior living situation: medical status, decreased strength, activity intolerance, impaired balance  Recommendations for discharge: TCU  Rationale for recommendations: Pt would benefit from additional skilled PT to address functional mobility, transfers, gait and balance       Entered by: Wendy Huntley 12/10/2019 2:16 PM

## 2019-12-10 NOTE — PLAN OF CARE
Occupational Therapy Discharge Summary    Reason for therapy discharge:    Discharged to transitional care facility.    Progress towards therapy goal(s). See goals on Care Plan in [facility] electronic health record for goal details.  Goals partially met.  Barriers to achieving goals:   discharge from facility.    Therapy recommendation(s):    Continued therapy is recommended.  Rationale/Recommendations:  to increase ADL/IADL independence and safety prior to return home, where pt has limited support available.

## 2019-12-10 NOTE — PLAN OF CARE
/70   Pulse 86   Temp 98.4  F (36.9  C) (Oral)   Resp 18   Wt 77.5 kg (170 lb 14.4 oz)   SpO2 94%   BMI 25.24 kg/m      Patient VSS on RA, afebrile. -152-111, managed with sliding scale insulin and 14 units of Lantus this AM. Generalized aches, managed with rest. Tolerating regular diet with fair appetite, carb coverage given, flynn count. NJ to cycled TF from 2866-1980 @ 60 ml/hr, 20 units of Humulin given prior. PIV-SL. BM today. Voiding adequate UOP. Incision approximated with steri strips. Up ad karely, multiple times ambulating in mroeno today and showered, seen by PT/OT. Updated med card and lab book, review of anti-rejection medications during each administration. Plan for [facility] TCU tomorrow @ 7623. Will continue with POC and notify MD with changes or concerns.

## 2019-12-10 NOTE — PLAN OF CARE
/71   Pulse 89   Temp 98.4  F (36.9  C)   Resp 16   Wt 76.7 kg (169 lb 1.6 oz)   SpO2 95%   BMI 24.97 kg/m      Patient VSS on RA, afebrile.  & 233, managed with sliding scale insulin. Denies pain. Tolerating regular diet with fair appetite, flynn count, carb coverage given. NJ to cycled TF from 0176-7382. PIV-removed. 3x loose BM today, still need to send C-Diff. Voiding adequate amounts. Incision approximated. Up ad karely. Med card and lab book updated.     DISCHARGE:  D: Patient with orders to discharge to  Rehab.  I: Discharge instructions, medications & follow ups reviewed with patient. Copy of discharge summary given to patient. PIV removed. All belongings packed & sent with patient. Report given to  TCU nurse.  A: Patient in stable condition. AVSS. Patient had no further questions regarding discharge instructions and medications. Patient transferred out by wheelchair & left with [facility] transport.  P: Plan for continuation of POC at  TCU.

## 2019-12-10 NOTE — PROGRESS NOTES
"IP Diabetes Management  Daily Note           Assessment and Plan:   HPI: Mr. Miller Workman is a 53 yo man with a history of type 2 diabetes complicated by peripheral neuropathy and retinopathy, cirrhosis secondary to ESTRADA, HCC, HTN, HLD, GERD, BPH, who is s/p DBD liver transplant on 11/11/19, who was readmitted 12/2/19 with acute renal failure, confusion, and elevated tacrolimus level.     Assessment:   1)Type II Diabetes Mellitus  2)Enteral feed hyperglycemia       Better glucose control at end of TF cycle, with addition scheduled aspart.    Plan:   -glargine (sub for tresiba) 14 units daily AM  1:10g CHO  MDSSI AC, HS, 0200  NPH 20 units at start of cycled tube feeds (1800)  Increase aspart to 3 units at 0200, to counter end-of-cycle hyperglycemia    Outpatient follow up: with [facility]  Plan discussed with patient., RN          Interval History:  Miller is working to eat more regular food.  Notes some things go down better than others.  Had a pretty big breakfast today (oatmeal w/ brown sugar, half muffin, Boost ).  BG >200 at 2 hours post.  Miller asks again to review the different insulins he is on.  He recalls correctly each one and what they are for.  Reviewed renal function improvement.  Discussed metformin and invokana.    Continues Nepro at 60 x 12 hours until can get 1440 kcal and 70 g protein/day.  Expect calorie counts at TCU    Has transport set up for TCU at 1445 today.  Was wondering about whethe r he needed any money. . .    Current nutritional intake and type: Orders Placed This Encounter      Regular Diet Adult      Diet    PTA Diabetes Regimen:   [employer] Expert for glucose monitoring and calculating aspart doses-(settings input into meter for calculation, he only has to enter his BG and CHO intake). Settings: \"meal rise\"- 50mg/dL. Snack size-10g, active insulin time 3 hrs.         Current regimen:   -Lantus (sub for tresiba) 14 units daily  1:10g CHO  MDSSI AC, HS, 0200  NPH 20 units " with cycled tube feeds  Held since transplant:   Metformin ER 500mg with supper  Farxiga 10mg daily    Discharge Planning: unknown, anticipating discharge to TCU today        Diabetes History:   Type of Diabetes: Type 2 Diabetes Mellitus, TPN/Enteral Feeding Induced Hyperglycemia  Lab Results   Component Value Date    A1C 6.6 08/08/2018    A1C 6.5 06/09/2017    A1C 7.8 10/25/2016              Review of Systems:   The Review of Systems is negative other than noted in the Interval History.           Medications:     Current Facility-Administered Medications   Medication     0.9% sodium chloride BOLUS     alpha-lipoic acid capsule 600 mg     aspirin (ASA) EC tablet 325 mg     carvedilol (COREG) tablet 12.5 mg     cyanocobalamin (VITAMIN B-12) tablet 1,000 mcg     dextrose 10 % 1,000 mL infusion     dextrose 10% infusion     glucose gel 15-30 g    Or     dextrose 50 % injection 25-50 mL    Or     glucagon injection 1 mg     ferrous sulfate (FEROSUL) tablet 325 mg     hypromellose-dextran (ARTIFICAL TEARS) 0.1-0.3 % ophthalmic solution 1 drop     [START ON 12/11/2019] insulin aspart (NovoLOG) inj (RAPID ACTING)     insulin aspart (NovoLOG) inj (RAPID ACTING)     insulin aspart (NovoLOG) inj (RAPID ACTING)     insulin aspart (NovoLOG) inj (RAPID ACTING)     insulin aspart (NovoLOG) inj (RAPID ACTING)     insulin glargine (LANTUS PEN) injection 14 Units     insulin isophane human (HumuLIN N PEN) injection 20 Units     Lidocaine (LIDOCARE) 4 % Patch 1 patch     lidocaine (LMX4) cream     lidocaine 1 % 1 mL     lidocaine patch in PLACE     lidocaine patch REMOVAL     magnesium oxide (MAG-OX) tablet 400 mg     mirtazapine (REMERON) tablet 15 mg     mycophenolic acid (GENERIC EQUIVALENT) EC tablet 540 mg     naloxone (NARCAN) injection 0.1-0.4 mg     omeprazole (priLOSEC) CR capsule 40 mg     ondansetron (ZOFRAN) tablet 4 mg     ondansetron (ZOFRAN-ODT) ODT tab 4 mg     phosphorus tablet 250 mg (PHOSPHA 250 NEUTRAL) per tablet  250 mg     prenatal multivitamin w/iron per tablet 1 tablet     prochlorperazine (COMPAZINE) tablet 5 mg     rosuvastatin (CRESTOR) tablet 5 mg     simethicone (MYLICON) chewable tablet 80 mg     sodium bicarbonate tablet 650 mg     sodium chloride (OCEAN) 0.65 % nasal spray 1 spray     sodium chloride (PF) 0.9% PF flush 3 mL     sodium chloride (PF) 0.9% PF flush 3 mL     sulfamethoxazole-trimethoprim (BACTRIM/SEPTRA) 400-80 MG per tablet 1 tablet     tacrolimus (GENERIC EQUIVALENT) capsule 4 mg     tamsulosin (FLOMAX) capsule 0.4 mg     tenofovir (VIREAD) tablet 300 mg     traMADol (ULTRAM) half-tab 25 mg     valGANciclovir (VALCYTE) tablet 450 mg     Vitamin D3 (CHOLECALCIFEROL) 25 mcg (1000 units) tablet 2,000 Units            Physical Exam:    /71   Pulse 89   Temp 98.4  F (36.9  C)   Resp 16   Wt 76.7 kg (169 lb 1.6 oz)   SpO2 95%   BMI 24.97 kg/m    General: pleasant, in no distress, resting in bed  HEENT NC/AT: NGT bridled, in place.   Lungs: unlabored respiration, no cough  ABD: rounded  Skin: warm and dry, no obvious lesions  MSK:  moves all extremities  Lymp:  Mental status: awake, alert, communicating clearly, asking questions  Psych: normal affect, calm and appropriate interaction.             Data:     Recent Labs   Lab 12/10/19  1143 12/10/19  0849 12/10/19  0517 12/10/19  0212 12/09/19  2133 12/09/19  1613 12/09/19  1220  12/09/19  0620  12/08/19  0522  12/07/19  0555  12/06/19  0552  12/05/19  0605   GLC  --   --  203*  --   --   --   --   --  232*  --  205*  --  203*  --  134*  --  226*   * 184*  --  163* 165* 111* 152*   < >  --    < >  --    < >  --    < >  --    < >  --     < > = values in this interval not displayed.       Recent Labs   Lab Test 12/10/19  0517 12/09/19  0620    138   POTASSIUM 4.2 4.2   CHLORIDE 107 110*   CO2 24 22   ANIONGAP 6 6   * 232*   BUN 34* 30   CR 1.52* 1.58*   DEBORAH 8.6 8.7     CBC RESULTS:   Recent Labs   Lab Test 12/10/19  0517   WBC  5.0   RBC 2.35*   HGB 7.1*   HCT 21.5*   MCV 92   MCH 30.2   MCHC 33.0   RDW 17.2*   PLT 79*       Wendy Levy APRN [facility] [phone]  Diabetes Management Team job code: [phone]

## 2019-12-10 NOTE — PLAN OF CARE
Physical Therapy Discharge Summary    Reason for therapy discharge:    Discharged to transitional care facility.    Progress towards therapy goal(s). See goals on Care Plan in [facility] electronic health record for goal details.  Goals partially met.  Barriers to achieving goals:   discharge from facility.    Therapy recommendation(s):    Continued therapy is recommended.  Rationale/Recommendations:  Pt would benefit from additional skilled PT to address functional mobility, transfers, gait and balance.

## 2019-12-10 NOTE — PLAN OF CARE
/68 (BP Location: Right arm)   Pulse 90   Temp 99.3  F (37.4  C) (Oral)   Resp 16   Wt 77.5 kg (170 lb 14.4 oz)   SpO2 94%   BMI 25.24 kg/m      AVSS on RA. B-2units Novolog per order.  Pt. c/o RLQ pain with some relief from warm packs, declined analgesics. No c/o's nausea. Cycled tube feeds at 60cc/hour into NJT from 6pm-6am. Incision with adhesive strips & dry/intact. Left PIV  saline locked.Pt. up with SBA & cane. Voided adequate amounts, no stools this shift.  Discharge to TCU with ride at 1445 today. Call light in reach. Continue to follow POC & notify team with change in status.

## 2019-12-11 LAB
C DIFF TOX B STL QL: NEGATIVE
GLUCOSE BLDC GLUCOMTR-MCNC: 153 MG/DL (ref 70–99)
GLUCOSE BLDC GLUCOMTR-MCNC: 171 MG/DL (ref 70–99)
GLUCOSE BLDC GLUCOMTR-MCNC: 180 MG/DL (ref 70–99)
GLUCOSE BLDC GLUCOMTR-MCNC: 192 MG/DL (ref 70–99)
GLUCOSE BLDC GLUCOMTR-MCNC: 210 MG/DL (ref 70–99)
GLUCOSE BLDC GLUCOMTR-MCNC: 240 MG/DL (ref 70–99)
SPECIMEN SOURCE: NORMAL

## 2019-12-11 PROCEDURE — 25000132 ZZH RX MED GY IP 250 OP 250 PS 637: Mod: GY | Performed by: NURSE PRACTITIONER

## 2019-12-11 PROCEDURE — 97116 GAIT TRAINING THERAPY: CPT | Mod: GP | Performed by: PHYSICAL THERAPIST

## 2019-12-11 PROCEDURE — 25000125 ZZHC RX 250: Performed by: NURSE PRACTITIONER

## 2019-12-11 PROCEDURE — 25000131 ZZH RX MED GY IP 250 OP 636 PS 637: Mod: GY | Performed by: PHYSICIAN ASSISTANT

## 2019-12-11 PROCEDURE — 97162 PT EVAL MOD COMPLEX 30 MIN: CPT | Mod: GP | Performed by: PHYSICAL THERAPIST

## 2019-12-11 PROCEDURE — 97530 THERAPEUTIC ACTIVITIES: CPT | Mod: GP | Performed by: PHYSICAL THERAPIST

## 2019-12-11 PROCEDURE — 25000128 H RX IP 250 OP 636: Performed by: NURSE PRACTITIONER

## 2019-12-11 PROCEDURE — 25000131 ZZH RX MED GY IP 250 OP 636 PS 637: Mod: GY | Performed by: NURSE PRACTITIONER

## 2019-12-11 PROCEDURE — 97166 OT EVAL MOD COMPLEX 45 MIN: CPT | Mod: GO | Performed by: OCCUPATIONAL THERAPIST

## 2019-12-11 PROCEDURE — 12000022 ZZH R&B SNF

## 2019-12-11 PROCEDURE — 00000146 ZZHCL STATISTIC GLUCOSE BY METER IP

## 2019-12-11 PROCEDURE — 25000131 ZZH RX MED GY IP 250 OP 636 PS 637: Mod: GY | Performed by: INTERNAL MEDICINE

## 2019-12-11 PROCEDURE — 99309 SBSQ NF CARE MODERATE MDM 30: CPT | Performed by: NURSE PRACTITIONER

## 2019-12-11 PROCEDURE — 25000132 ZZH RX MED GY IP 250 OP 250 PS 637: Mod: GY | Performed by: HOSPITALIST

## 2019-12-11 PROCEDURE — 97535 SELF CARE MNGMENT TRAINING: CPT | Mod: GO | Performed by: OCCUPATIONAL THERAPIST

## 2019-12-11 RX ORDER — MULTIVITAMIN,THERAPEUTIC
1 TABLET ORAL DAILY
Status: DISCONTINUED | OUTPATIENT
Start: 2019-12-12 | End: 2019-12-18 | Stop reason: HOSPADM

## 2019-12-11 RX ORDER — LANOLIN ALCOHOL/MO/W.PET/CERES
1000 CREAM (GRAM) TOPICAL DAILY
Status: DISCONTINUED | OUTPATIENT
Start: 2019-12-12 | End: 2019-12-18 | Stop reason: HOSPADM

## 2019-12-11 RX ORDER — VITAMIN B COMPLEX
2000 TABLET ORAL DAILY
Status: DISCONTINUED | OUTPATIENT
Start: 2019-12-12 | End: 2019-12-18 | Stop reason: HOSPADM

## 2019-12-11 RX ORDER — MIRTAZAPINE 7.5 MG/1
15 TABLET, FILM COATED ORAL AT BEDTIME
Status: DISCONTINUED | OUTPATIENT
Start: 2019-12-11 | End: 2019-12-18 | Stop reason: HOSPADM

## 2019-12-11 RX ORDER — TAMSULOSIN HYDROCHLORIDE 0.4 MG/1
0.4 CAPSULE ORAL EVERY EVENING
Status: DISCONTINUED | OUTPATIENT
Start: 2019-12-11 | End: 2019-12-18 | Stop reason: HOSPADM

## 2019-12-11 RX ORDER — AZATHIOPRINE 50 MG/1
100 TABLET ORAL DAILY
Status: DISCONTINUED | OUTPATIENT
Start: 2019-12-12 | End: 2019-12-16

## 2019-12-11 RX ORDER — SODIUM BICARBONATE 650 MG/1
650 TABLET ORAL 2 TIMES DAILY
Status: DISCONTINUED | OUTPATIENT
Start: 2019-12-11 | End: 2019-12-18 | Stop reason: HOSPADM

## 2019-12-11 RX ADMIN — CARVEDILOL 12.5 MG: 3.12 TABLET, FILM COATED ORAL at 08:08

## 2019-12-11 RX ADMIN — INSULIN ASPART 3 UNITS: 100 INJECTION, SOLUTION INTRAVENOUS; SUBCUTANEOUS at 10:39

## 2019-12-11 RX ADMIN — MELATONIN 2000 UNITS: at 08:07

## 2019-12-11 RX ADMIN — INSULIN ASPART 2 UNITS: 100 INJECTION, SOLUTION INTRAVENOUS; SUBCUTANEOUS at 18:43

## 2019-12-11 RX ADMIN — ONDANSETRON 4 MG: 4 TABLET, ORALLY DISINTEGRATING ORAL at 20:32

## 2019-12-11 RX ADMIN — ROSUVASTATIN CALCIUM 5 MG: 5 TABLET, FILM COATED ORAL at 20:57

## 2019-12-11 RX ADMIN — VALGANCICLOVIR 450 MG: 450 TABLET, FILM COATED ORAL at 08:08

## 2019-12-11 RX ADMIN — SODIUM BICARBONATE 650 MG TABLET 650 MG: at 08:09

## 2019-12-11 RX ADMIN — TUBERCULIN PURIFIED PROTEIN DERIVATIVE 5 UNITS: 5 INJECTION, SOLUTION INTRADERMAL at 14:33

## 2019-12-11 RX ADMIN — CYANOCOBALAMIN TAB 1000 MCG 1000 MCG: 1000 TAB at 08:07

## 2019-12-11 RX ADMIN — Medication 1 PACKET: at 20:25

## 2019-12-11 RX ADMIN — ASPIRIN 325 MG: 325 TABLET, DELAYED RELEASE ORAL at 08:07

## 2019-12-11 RX ADMIN — MAGNESIUM OXIDE TAB 400 MG (241.3 MG ELEMENTAL MG) 400 MG: 400 (241.3 MG) TAB at 08:09

## 2019-12-11 RX ADMIN — Medication 600 MG: at 08:07

## 2019-12-11 RX ADMIN — FERROUS SULFATE TAB 325 MG (65 MG ELEMENTAL FE) 325 MG: 325 (65 FE) TAB at 18:32

## 2019-12-11 RX ADMIN — CARVEDILOL 12.5 MG: 3.12 TABLET, FILM COATED ORAL at 18:32

## 2019-12-11 RX ADMIN — INSULIN ASPART 1 UNITS: 100 INJECTION, SOLUTION INTRAVENOUS; SUBCUTANEOUS at 14:32

## 2019-12-11 RX ADMIN — SODIUM BICARBONATE 650 MG TABLET 650 MG: at 20:24

## 2019-12-11 RX ADMIN — MAGNESIUM OXIDE TAB 400 MG (241.3 MG ELEMENTAL MG) 400 MG: 400 (241.3 MG) TAB at 20:23

## 2019-12-11 RX ADMIN — SIMETHICONE CHEW TAB 80 MG 80 MG: 80 TABLET ORAL at 20:32

## 2019-12-11 RX ADMIN — PRENATAL VITAMINS-IRON FUMARATE 27 MG IRON-FOLIC ACID 0.8 MG TABLET 1 TABLET: at 08:08

## 2019-12-11 RX ADMIN — THERA TABS 1 TABLET: TAB at 08:08

## 2019-12-11 RX ADMIN — TAMSULOSIN HYDROCHLORIDE 0.4 MG: 0.4 CAPSULE ORAL at 20:23

## 2019-12-11 RX ADMIN — MIRTAZAPINE 15 MG: 7.5 TABLET, FILM COATED ORAL at 20:57

## 2019-12-11 RX ADMIN — AZATHIOPRINE 100 MG: 50 TABLET ORAL at 08:08

## 2019-12-11 RX ADMIN — TENOFOVIR DISOPROXIL FUMARATE 300 MG: 300 TABLET, COATED ORAL at 08:07

## 2019-12-11 RX ADMIN — MICONAZOLE NITRATE: 20 CREAM TOPICAL at 20:35

## 2019-12-11 RX ADMIN — TACROLIMUS 4 MG: 1 CAPSULE ORAL at 20:33

## 2019-12-11 RX ADMIN — DIBASIC SODIUM PHOSPHATE, MONOBASIC POTASSIUM PHOSPHATE AND MONOBASIC SODIUM PHOSPHATE 250 MG: 852; 155; 130 TABLET ORAL at 20:24

## 2019-12-11 RX ADMIN — SULFAMETHOXAZOLE AND TRIMETHOPRIM 1 TABLET: 400; 80 TABLET ORAL at 08:08

## 2019-12-11 RX ADMIN — TACROLIMUS 4 MG: 1 CAPSULE ORAL at 08:09

## 2019-12-11 RX ADMIN — INSULIN GLARGINE 14 UNITS: 100 INJECTION, SOLUTION SUBCUTANEOUS at 10:39

## 2019-12-11 RX ADMIN — DIBASIC SODIUM PHOSPHATE, MONOBASIC POTASSIUM PHOSPHATE AND MONOBASIC SODIUM PHOSPHATE 250 MG: 852; 155; 130 TABLET ORAL at 08:09

## 2019-12-11 RX ADMIN — MYCOPHENOLIC ACID 540 MG: 180 TABLET, DELAYED RELEASE ORAL at 08:09

## 2019-12-11 RX ADMIN — FERROUS SULFATE TAB 325 MG (65 MG ELEMENTAL FE) 325 MG: 325 (65 FE) TAB at 14:26

## 2019-12-11 RX ADMIN — FERROUS SULFATE TAB 325 MG (65 MG ELEMENTAL FE) 325 MG: 325 (65 FE) TAB at 08:08

## 2019-12-11 RX ADMIN — OMEPRAZOLE 40 MG: 20 CAPSULE, DELAYED RELEASE ORAL at 08:07

## 2019-12-11 ASSESSMENT — ACTIVITIES OF DAILY LIVING (ADL): PREVIOUS_RESPONSIBILITIES: MEAL PREP;HOUSEKEEPING;LAUNDRY;SHOPPING;MEDICATION MANAGEMENT;FINANCES;DRIVING

## 2019-12-11 NOTE — PROGRESS NOTES
"   12/11/19 4924   Quick Adds   Quick Adds Certification   Type of Visit Initial Occupational Therapy Evaluation   Living Environment   Lives With alone   Living Arrangements condominium   Home Accessibility stairs to enter home;stairs within home   Number of Stairs, Main Entrance other (see comments)  (20 steps from garage to main level)   Stair Railings, Main Entrance railings on both sides of stairs   Number of Stairs, Within Home, Primary other (see comments)  (about 20 steps from main level to bedroom)   Stair Railings, Within Home, Primary railings on both sides of stairs   Transportation Anticipated car, drives self;public transportation   Living Environment Comment patient needs to use stairs to enter and get to bedroom. Patient friend (Art) (post transplant caregiver has returned to [place]) and is no longer staying with patient to assist - patient has been receiving a few (3) hours of PCA services daily    Self-Care   Usual Activity Tolerance good   Current Activity Tolerance fair   Regular Exercise No   Equipment Currently Used at Home cane, straight;shower chair   Activity/Exercise/Self-Care Comment decreased activity tolerance upon return to home prior to latest admit - patient was issued an SPC after last hospital admission, has been consistently using, but reports he uses it more at the end of the day, as that is when he feels \"more tired\" reportshas [employer] for cleaning once a month, has groceries delivered to his door, was doing own fianaces and drives (not for 3 months yet), has a nurse come in two times a wekk and sets up meds and checks his vitals   Functional Level   Ambulation 1-->assistive equipment   Transferring 0-->independent   Toileting 0-->independent   Bathing 2-->assistive person   Dressing 2-->assistive person   Eating 0-->independent   Communication 0-->understands/communicates without difficulty   Swallowing 0-->swallows foods/liquids without difficulty   Cognition 1 - attention or " memory deficits   Fall history within last six months no   Which of the above functional risks had a recent onset or change? ambulation;transferring;toileting;bathing;dressing   Prior Functional Level Comment patient reportsIND/M'od I for all ADL/self cares, although increased difficulty - currently using SPC since transplant       Present no   Language English   General Information   Onset of Illness/Injury or Date of Surgery - Date 12/02/19   Referring Physician Dr. Yasmine Menon, Dr. Flaquita [place]   Patient/Family Goals Statement to be able to pay bills and manage medications   Additional Occupational Profile Info/Pertinent History of Current Problem patient is a 54 y.o. male with PMHX significant for cirrhosis secondary to ESTRADA diagnosed in 2013, HCC 2018, HTN, GERD, BPH and DMII. S/p liver transplant 11/11/19 complicated by hematoma evacuation on POD 1 - recedntly admitted with JERONIMO, nausea, diarrhea, confusion and weakness , and elevated tacrolimus level   Precautions/Limitations fall precautions;abdominal precautions  (neutropenic precautions)   Weight-Bearing Status - LUE other (see comments)  (no lifting > 10#'s)   Weight-Bearing Status - RUE other (see comments)  (no lifting > 10#'s)   Weight-Bearing Status - LLE full weight-bearing   Weight-Bearing Status - RLE full weight-bearing   Heart Disease Risk Factors Diabetes;High blood pressure;Lack of physical activity;Medical history;Gender;Age   General Observations sitting up in bed / pleasant   General Info Comments activity  ; up ad karely   Cognitive Status Examination   Orientation orientation to person, place and time   Level of Consciousness alert   Follows Commands (Cognition) WNL   Memory impaired   Attention Distractible during evaluation;Quiet environment required;Sustained attention impaired   Organization/Problem Solving Sequencing impaired;Problem solving impaired   Executive Function Impulsive;Cognitive flexibility  "impaired;Planning ability impaired   Cognitive Comment appears good with STM - reports \"still foggy\" - will further assess - needs repetition of some tasks   Visual Perception   Visual Perception Wears glasses   Visual Perception Comments reports \"lots of changes\" in vision since admit - reports having shots for cataracts prior to admit and having some blurriness / wears glasses all the time   Sensory Examination   Sensory Comments reports tingling in feet/toes \"sometimes\"    Integumentary/Edema   Integumentary/Edema no deficits were identifed   Integumentary/Edema Comments slight edema noted at incision site   Posture   Posture forward head position;protracted shoulders   Range of Motion (ROM)   ROM Comment B UE's WNL's   Strength   Strength Comments generalized deconditioning, NT d/t abdominal precautions   Hand Strength   Hand Strength Comments good grasp B UE's for holding items   Muscle Tone Assessment   Muscle Tone Quick Adds No deficits were identified   Coordination   Coordination Comments B Ue's slow movement with finger/thumb opposition    Mobility   Bed Mobility Bed mobility skill: Sit to supine;Bed mobility skill: Supine to sit   Bed Mobility Skill: Sit to Supine   Level of [place]: Sit/Supine independent   Physical Assist/Nonphysical Assist: Sit/Supine set-up required   Assistive Device: Sit/Supine bedrail   Bed Mobility Skill: Supine to Sit   Level of [place]: Supine/Sit independent   Physical Assist/Nonphysical Assist: Supine/Sit set-up required   Assistive Device: Supine/Sit bedrail   Transfer Skill: Bed to Chair/Chair to Bed   Level of [place]: Bed to Chair stand-by assist   Physical Assist/Nonphysical Assist: Bed to Chair set-up required   Weight-Bearing Restrictions full weight-bearing   Assistive Device - Transfer Skill Bed to Chair Chair to Bed Rehab Eval straight cane   Transfer Skill: Sit to Stand   Level of [place]: Sit/Stand stand-by assist   Physical Assist/Nonphysical " Assist: Sit/Stand set-up required   Transfer Skill: Sit to Stand full weight-bearing   Assistive Device for Transfer: Sit/Stand straight cane   Transfer Skill: Toilet Transfer   Level of [place]: Toilet stand-by assist   Physical Assist/Nonphysical Assist: Toilet set-up required   Weight-Bearing Restrictions: Toilet full weight-bearing   Assistive Device straight cane   Tub/Shower Transfer   Tub/Shower Transfer Comments NT- has a tub/shower combo with a chair no bars will further assess   Balance   Balance Comments appears steady in static and dynamic balance - will further assess   Bathing   Level of [place] stand-by assist   Physical Assist/Nonphysical Assist set-up required   Upper Body Dressing   Level of [place]: Dress Upper Body independent   Physical Assist/Nonphysical Assist: Dress Upper Body set-up required   Lower Body Dressing   Level of [place]: Dress Lower Body stand-by assist   Physical Assist/Nonphysical Assist: Dress Lower Body set-up required   Toileting   Level of [place]: Toilet stand-by assist   Physical Assist/Nonphysical Assist: Toilet set-up required   Grooming   Level of [place]: Grooming stand-by assist   Physical Assist/Nonphysical Assist: Grooming set-up required   Eating/Self Feeding   Level of [place]: Eating independent  (NG tube in place - reports poor appetite)   Instrumental Activities of Daily Living (IADL)   Previous Responsibilities meal prep;housekeeping;laundry;shopping;medication management;finances;driving   IADL Comments patient has 3 hours of PCA assist daily    Activities of Daily Living Analysis   Impairments Contributing to Impaired Activities of Daily Living balance impaired;cognition impaired;flexibility decreased;pain;post surgical precautions;strength decreased   General Therapy Interventions   Planned Therapy Interventions ADL retraining;IADL retraining;balance training;bed mobility  training;cognition;groups;strengthening;stretching;transfer training;visual perception;home program guidelines;progressive activity/exercise;risk factor education   Clinical Impression   Criteria for Skilled Therapeutic Interventions Met yes, treatment indicated   OT Diagnosis impairaed ADL/self cares/deconditioning   Influenced by the following impairments cognition, activity tolerance, post surgical precautions,, deconditioning   Assessment of Occupational Performance 3-5 Performance Deficits   Identified Performance Deficits bathing, dressing, toileting, home management,   Clinical Decision Making (Complexity) Moderate complexity   Therapy Frequency 6x/week   Predicted Duration of Therapy Intervention (days/wks) one week   Anticipated Equipment Needs at Discharge other (see comments)  (grab bar)   Anticipated Discharge Disposition Home;Home with Assist;Home with Home Therapy   Risks and Benefits of Treatment have been explained. Yes   Patient, Family & other staff in agreement with plan of care Yes   Clinical Impression Comments patient would benefit from skilled OT to address basic ADL/self cares and IADL's for a safe home discharge plan with PCA assist and other services   Therapy Certification   Start of Care Date 12/02/19   Certification date from 12/11/19   Certification date to 01/11/20   Medical Diagnosis see above for PMHX   Certification I certify the need for these services furnished under this plan of treatment and while under my care.  (Physician co-signature of this document indicates review and certification of the therapy plan).   Total Evaluation Time   Total Evaluation Time (Minutes) 30

## 2019-12-11 NOTE — PLAN OF CARE
Pt new admission this shift from 7A. Pt a/o. VSS. Denied pain. Pt came with belongings, with the exception of his transplant book. RN called 7A requesting call back but not found. Pt very frustrated by this. Skin check done, intact except clamshell incision w/ scabs.  and 152, humulin given at TF start. TF started late-1930, due to pt wanting to eat dinner and start TF after, now running per orders. Stool sample sent, stool was soft and green. Voiding adequately using urinal. Poor PO intake. Pt anxious about medication regimen, no mycophenalic acid was ordered this evening, on call provider notified and MD ordered as previous. Sticky notes left for MD regarding pt questions/needs, see chart. RN oriented pt to unit, including therapy schedule and use of call light, meals and answered questions as able. Will continue to monitor. PT/OT to eval 12/11.

## 2019-12-11 NOTE — PLAN OF CARE
PT: eval completed and treatment initiated. Pt s/p liver transplant with post-op complications. Pt lives alone in a condo with stairs to get to main living level and bedroom. Pt receives PCA services 3 hours per day. Pt has been using SEC since transplant for all mobility.     Mobility: pt ambulates >300ft with SEC, SBA. Pt performs all transfers and bed mobility, SBA. Performs 18 stairs, SBA. Pt becomes fatigued with activity requiring seated rest breaks.     Discharge Planner PT   Patient plan for discharge: Home  Current status: see above  Barriers to return to prior living situation: stairs, fatigue, lives alone       Entered by: Tre Og 12/11/2019 10:39 AM

## 2019-12-11 NOTE — PROGRESS NOTES
TCU Care Coordinator Progress Note    Admission date: 12/10/2019    Data: Miller Workman is a 53 yo male on TCU for rehab and tube feeding following hospitalization for JERONIMO and weakness. He had a liver transplant on 11/11/2019 and was home for about 10 days before being re-hospitalized.    Intervention: Met with patient to introduce the role of Care Coordinator and to begin discussion of anticipated discharge planning needs. Initiated enteral referral with [facility] for home tube feeding. I previously received enteral referral and said patient did not meet Medicare criteria for coverage. PLC enteral teaching referral entered.    Assessment: Patient is a current home care patient with [facility] for SN/PT/OT services. He also pays for PCA services 2-3 hours a day for assistance around the house. He lives alone, had a friend staying with him, but no longer. He is hoping to get off enteral feeding, but need to be prepared for home enteral in case he is unable. Patient is aware he would need to self pay for home enteral, states he can pay for it, but doesn't feel comfortable with the idea of doing tube feeding at home. He is diabetic, on insulin, has glucometer, states he was managing well at home, no new teaching needs.    Plan: Will continue to follow discharge planning needs throughout TCU stay. Potential discharge in about a week if he meets TCU therapy goals and remains medically stable.    Soto Zaragoza RN, BSN, Patient Care Management Coordinator  [facility]  [address], [address] [place] [address]  [email or web address]  [email or web address]   Desk: [phone] TCU Main [phone] Fax [phone] Pager [phone]

## 2019-12-11 NOTE — PROGRESS NOTES
Attestation:    MELITA, Beto Garcias, [facility] staff  have reviewed and agree with the following  student note on 12/12/2019 at 2:05 PM.         [facility] HEALTH SERVICES  SPIRITUAL ASSESSMENT Progress Note  [facility] ([facility] R TRTC     REFERRAL SOURCE: Hospital  Request/Communion    I called and consulted with  staff who also consulted with Frandy's nurse. The  staff said pt  wanted to do communion today or tomorrow. [facility] the staff [facility] and if the patient does not receive communion they should follow up with [facility].    PLAN: [facility] remains available to follow-up with pt's need for communion    Barbara Jacob  Chaplain Resident  Pager [phone]

## 2019-12-11 NOTE — PROGRESS NOTES
Social Work: Initial Assessment with Discharge Plan    Patient Name: Frandy Workman  : 1965  Age: 54 year old  MRN: [id number]  Completed assessment with: Patient  Admitted to TCU: 12/10/2019    Presenting Information   Date of SW assessment: 2019  Health Care Directive: Copy in Chart  Primary Health Care Agent: Patient  Secondary Health Care Agent: Davi Saunders (friend) is named as primary health care agent. Bebeto Rubio (friend) is named as alternate health care agent.  [facility] Situation: [facility] [place] 20 steps from garage to main level.  Previous Functional Status: Decreased strength per patient.  DME available: See therapy notes  Patient and family understanding of hospitalization: Rehab  Cultural/Language/Spiritual Considerations: English speaking. [facility]  Abuse concerns: None indicated  BIMS: Pt scored 15 on BIMS indicating cognitively intact  PHQ-9: Pt scored 3 on PHQ-9 indicating minimal depressive symptoms  PAS: confirmation number- [id number]  Has there been a level II screen?  No  Were there any recommendations in the screen? N/A  If yes, will the recommendations we incorporated into the Plan of Care?  N/A  Physical Health  Reason for admission: Patient is a 54 y.o. male with PMHX significant for cirrhosis secondary to ESTRADA diagnosed in , HCC , HTN, GERD, BPH and DMII. S/p liver transplant 19 complicated by hematoma evacuation on POD 1 - recedntly admitted with JERONIMO, nausea, diarrhea, confusion and weakness , and elevated tacrolimus level    Provider Information   Primary Care Physician:Maximo Tucker   : None    Mental Health:   Diagnosis: None reported  Current Support/Services: NA  Previous Services: NA  Services Needed/Recommended: Spiritual health services and health psychology available during TCU stay if interested.    Substance Use:  Diagnosis: None reported  Current Support/Services: NA  Previous  Services: NA  Services Needed/Recommended: NA    Support System  Marital Status:   Family support: None reported  Other support available: Friend, Art, was staying with patient, but is no longer due to work in [place]. Patient pays for PCA services 2-3 hours a day for assistance around the house. Transportation services with [employer].  Gaps in support system: Limited support system.    Community Resources  Current in home services: [facility] SN/PT/OT  Previous services: See above    Financial/Employment/Education  Employment Status: Disability  Income Source: Long term Disability through his former employer ($4,164.12) and SSDI ($2,463.88).  Education: Did not discuss  Financial Concerns:  Increased health care costs  Insurance: Medicare and [facility]      Discharge Plan   Patient and family discharge goal: Patient's goal is to return home with recommended services. Patient expressed not wanting to return home with tube feed as he feels it would be unmanageable.   Provided Education on discharge plan: YES  Patient agreeable to discharge plan:  YES  A list of Medicare Certified Facilities was provided to the patient and/or family to encourage patient choice. Based on location and rating, patient would like referrals made to: NA  General information regarding anticipated insurance coverage and possible out of pocket cost was discussed. Patient and patient's family are aware patient may incur the cost of transportation to the facility, pending insurance payment: YES  Barriers to discharge: Medical clearance, therapy goals met, safe discharge plan.    Discharge Recommendations   Disposition: Home  Transportation Needs: Patient reports he uses [employer] service  Name of Transportation Company and Phone: See above    Additional comments   SW introduced self and role of SW in TCU. Patient will receive care plan goals and orders tomorrow 12/13. SW will continue to follow and  assist as needed.    ALEXYS Montez,[facility]  TCU    Phone: [phone]  Pager: [phone]

## 2019-12-11 NOTE — PROGRESS NOTES
"   12/11/19 0759   Quick Adds   Quick Adds Certification   Type of Visit Initial PT Evaluation   Living Environment   Lives With alone   Living Arrangements condominium   Home Accessibility stairs to enter home;stairs within home   Number of Stairs, Main Entrance other (see comments)  (16 steps from garage to main level)   Stair Railings, Main Entrance railings on both sides of stairs   Number of Stairs, Within Home, Primary other (see comments)  (about 16 steps from main level to bedroom)   Stair Railings, Within Home, Primary railings on both sides of stairs   Transportation Anticipated other (see comments)  (see living environment comment)   Living Environment Comment PT: patient needs to use stairs to enter and get to bedroom. Patient friend (Art) (post transplant caregiver has returned to [place]) and is no longer staying with patient to assist - patient has been receiving a few (3) hours of PCA services daily - washing clothes, groceries. Uses PCA for transportation to get to appointments.   Self-Care   Usual Activity Tolerance good   Current Activity Tolerance moderate   Regular Exercise No   Equipment Currently Used at Home cane, straight;shower chair   Activity/Exercise/Self-Care Comment PT: decreased activity tolerance upon return to home prior to latest admit - patient was issued an SPC after last hospital admission, has been consistently using, but reports he uses it more at the end of the day, as that is when he feels \"more tired\"   Functional Level Prior   Ambulation 1-->assistive equipment   Transferring 0-->independent   Toileting 0-->independent   Bathing 1-->assistive equipment   Communication 0-->understands/communicates without difficulty   Swallowing 0-->swallows foods/liquids without difficulty   Cognition 1 - attention or memory deficits   Fall history within last six months no   Which of the above functional risks had a recent onset or change? ambulation;transferring;toileting;bathing;dressing " "  Prior Functional Level Comment PT: patient reports IND/Mod I for all ADL/self cares, although increased difficulty - currently using SPC since transplant   General Information   Onset of Illness/Injury or Date of Surgery - Date 12/02/19   Referring Physician Referral: Melissa Larry APRN CNP, Dr. Flaquita Austin MD   Patient/Family Goals Statement PT: improve strength and balance in legs and arms   Pertinent History of Current Problem (include personal factors and/or comorbidities that impact the POC) Per chart review \" 54 year old male with PMHx significant for cirrhosis secondary to ESTRADA diagnosed in 2013, HCC 2018, HTN, HLD, GERD, BPH and DMII. S/p liver transplant 11/11/19 complicated by hematoma evacuation on POD 1. Admitted with JERONIMO, nausea, diarrhea, confusion and weakness\"   Precautions/Limitations fall precautions;abdominal precautions   General Observations PT: pt greeted in supine, communicates verbally with no concerns.    Cognitive Status Examination   Orientation orientation to person, place and time   Level of Consciousness alert   Follows Commands and Answers Questions able to follow multistep instructions;100% of the time   Personal Safety and Judgment intact   Memory intact   Pain Assessment   Patient Currently in Pain   (5/10 in supine)   Integumentary/Edema   Integumentary/Edema Comments PT: no edema noted in LE. Pt transplant scar appears to be healing well, some light scabbing noted on edges of scar.    Posture    Posture Forward head position   Posture Comments PT: mild forward head posture noted in sitting.    Range of Motion (ROM)   ROM Comment PT: all LE joints are grossly WFLs.    Strength   Strength Comments PT: LE strength grossly 4/5 strength and symetrical.    Bed Mobility   Bed Mobility Comments PT: pt performs supine<>sit, SBA using log roll technique.    Transfer Skills   Transfer Comments PT: pt performs sit<>stand, SBA.    Gait   Gait Comments PT: pt ambulates " "343ft with SEC, SBA. Using cane lightly for support, pt reports increased fatigue and feeling \"winded\" at the end of ambulation.    Balance   Balance Comments PT: pt able to sit and stand statically without UE support. Pt requires SEC for balance with dynamic movement when fatigued.    Sensory Examination   Sensory Perception Comments PT: pt reports B peripheral neuropathy symptoms in LE with occasional numbness and some tingling. Light touch sesation intact B.    Coordination   Coordination Comments PT: heel to shin intact and accurate bilaterally.    Muscle Tone   Muscle Tone Comments PT: not formally assessed, no deficits noted with mobility.    General Therapy Interventions   Planned Therapy Interventions ADL retraining;balance training;bed mobility training;gait training;joint mobilization;manual therapy;neuromuscular re-education;ROM;strengthening;stretching;transfer training;home program guidelines;progressive activity/exercise   Clinical Impression   Criteria for Skilled Therapeutic Intervention yes, treatment indicated   PT Diagnosis PT: deconditioning and gait instability    Influenced by the following impairments PT: pain, decreased strength, endurance, abdominal precautions   Functional limitations due to impairments PT: impaired bed mobility, gait, and transfers   Clinical Presentation Evolving/Changing   Clinical Presentation Rationale PT: PMH, comorbidities, recent hospitalization, clinical judgement of mobility    Clinical Decision Making (Complexity) Moderate complexity   Therapy Frequency 6x/week   Predicted Duration of Therapy Intervention (days/wks) 12/17/19   Anticipated Discharge Disposition Home with Home Therapy   Risk & Benefits of therapy have been explained Yes   Patient, Family & other staff in agreement with plan of care Yes   Clinical Impression Comments PT: pt is a 54 year-old male s/p liver transplant with complications. PT eval revealed fatigue with functional mobility as well as " deficits with LE strength. Pt would benefit from skilled PT in order to optimize recovery and functional independence.    Therapy Certification   Start of care date 12/11/19   Certification date from 12/11/19   Certification date to 01/03/20   Medical Diagnosis S/P liver transplant    Certification I certify the need for these services furnished under this plan of treatment and while under my care.  (Physician co-signature of this document indicates review and certification of the therapy plan).    Total Evaluation Time   Total Evaluation Time (Minutes) 20

## 2019-12-11 NOTE — PROGRESS NOTES
[facility]  Patient is currently open to home care services with [facility].  The patient is currently receiving      RN PT and OT  services.  [facility]  and team have been notified of patient admission.  [facility] liaison will continue to follow patient during stay.  If appropriate provide orders to resume home care at time of discharge.

## 2019-12-11 NOTE — PLAN OF CARE
A &O x 4, able to communicate needs. Denies SOB, chest pain, nausea, or any discomfort. Mod I in room w/ cane. Enteric isolations discontinued. TF stopped at 0800 due to late start last night.  and 171, sliding scale given per orders. Pt vomited this morning after a few bites of breakfast. Concerned about medications changing and times of medications being different from what is on his personal med list. After discussion with provider pt verbalized lessened anxiety and feeling more comfortable with the plan going forward. Infrequent cough noted. Called [facility] about pt's missing lab book, they are to call back with any news. Pt managed to eat a small late lunch, carb coverage given. Will continue with plan of care.

## 2019-12-11 NOTE — PROGRESS NOTES
CLINICAL NUTRITION SERVICES - ASSESSMENT NOTE     Nutrition Prescription    RECOMMENDATIONS FOR MDs/PROVIDERS TO ORDER:  None at this time    Malnutrition Status:    Non-severe malnutrition in the context of acute on chronic illness and disease.     Recommendations already ordered by Registered Dietitian (RD):  Ordered room service assistance to help ordering high protein and high calorie food from menu.    Future/Additional Recommendations:  -Monitor TF tolerance. Consider switch to standardized formula if allowed.   -Monitor PO intake, if meeting >50% of his needs through oral intake, consider decrease TF regimen.      REASON FOR ASSESSMENT  Frandy Workman is a/an 54 year old male assessed by the dietitian for Provider Order - Registered Dietitian to Assess and Order TF per Medical Nutrition Therapy Protocol    NUTRITION HISTORY  - Per chart review, pt was previously admitted on 12/02 with acute renal failure, confusion, and elevated tacrolimus level. Pt has h/o type 2 DM complicated with peripheral neuropathy and retinopathy, cirrhosis. He had liver transplant on 11/11/19. Pt had Glucema @ home. Feeding tube was placed on 12/3. Cycled tube feeding regimen started on 12/4 due to poor PO intake. Started Nepro due to hyperkalemia. Decreased PO intake to 0-200 kcal on 12/5-12/6 from calorie count.   - Per RN's notes, pt had delayed tube feeding started yesterday at 1930 and completed this morning at 0800.    CURRENT NUTRITION ORDERS  Diet: High Kcal/High Protein  Nutrition Support: through   - Nepro  via NJ-tube @  60mL/hr x 12 hrs (6pm-6am) providing 720 mL, 1296 kcal (17 kcal/kg), 58 g protein (0.7 g/kg), 116 g CHO, 6 g fiber, and 526 mL free water per DW of 77.6 kg.  - Water Flushes: 60 mL before and after the cycle (TF + Flushes = 646 mL water; meeting 23% hydration needs).  Supplements: Boost glucose control (chocolate/vanilla) TID with meals.    Intake/Tolerance: 25% of meals per flowsheet. On average, pt is  "ordering 530 kcal and 32 g protein for dinner since admit on 12/10 per [facility]. This is likely meeting 27% minimum energy and 34% protein needs.  -One bowel movement on 12/10  -Pt no any food restrictions or preferences that severely limit their menu options and feel their menu choices are adequate.   -Pt has low appetite since feeling nauseous and vomited after two bites of breakfast per pt. Pt felt it might be secondary to new medications he has started combined with feeling full after the tube feeding.   -Per pt, his usual weight was 185-192 lbs (84-87 kg). He felt getting weak lately.     LABS  Labs reviewed  BUN (H, 34 on 12/10), Creatinine (H, 1.52 on 12/10), GFR (L, 51 on 12/10), Phosphorus (L, 2.2 on 12/10). Alkaline phosphatase (H, 162 on 12/10), glucose (153-210 on 12/11), C-diff (Negative)    MEDICATIONS  Medications reviewed  Vitamin B-12 1,000 mcg/d; Vitamin D3, multivitamin; prenatal mulitavitamin with iron   Insulin regimen; phosphorus     ANTHROPOMETRICS  Ht Readings from Last 1 Encounters:   12/10/19 1.753 m (5' 9\")     Most Recent Weight: 77.6 kg (171 lb)  IBW: 72.7 kg (107% IBW)  BMI: Overweight BMI 25-29.9  Weight History: Patient has lost 6 lbs (3%) over the last ~1 month, overall lost 10 lbs (6%) over the last 5 months. Patient reports UBW of 185-192 lbs.  Wt Readings from Last 10 Encounters:   12/10/19 77.6 kg (171 lb)   12/10/19 76.7 kg (169 lb 1.6 oz)   12/02/19 74.8 kg (165 lb)   12/02/19 74.9 kg (165 lb 3.2 oz)   11/26/19 78.3 kg (172 lb 11.2 oz)   11/25/19 76.9 kg (169 lb 9.6 oz)   11/17/19 80.2 kg (176 lb 12.8 oz)   10/28/19 78 kg (172 lb)   10/28/19 78.4 kg (172 lb 12.8 oz)   10/01/19 80.1 kg (176 lb 8 oz)   07/08/19 82.3 kg (181 lb 6.4 oz)     Dosing Weight: 78 kg (lowest wt since admit on 12/10)    ASSESSED NUTRITION NEEDS  Estimated Energy Needs: 8614-5675 kcals/day (30 - 35 kcals/kg )  Justification: Increased needs and Post-op  Estimated Protein Needs:  grams protein/day " (1.2 - 1.5 grams of pro/kg)  Justification: Increased needs and Post-op  Estimated Fluid Needs: Per MD    PHYSICAL FINDINGS  See malnutrition section below.     MALNUTRITION  % Intake: Decreased intake does not meet criteria  % Weight Loss: Up to 10% in 6 months (non-severe)  Subcutaneous Fat Loss: Facial region:  mild and Upper arm:  mild  Muscle Loss: Temporal:  mild, Facial & jaw region:  mild, Scapular bone:  moderate, Thoracic region (clavicle bone):  moderate, Upper arm (bicep, tricep):  Mild to moderate, Upper leg (quadricep, hamstring):  Moderate and Posterior calf:  moderate  Fluid Accumulation/Edema: None noted  Malnutrition Diagnosis: Non-severe malnutrition in the context of acute on chronic illness and disease.     NUTRITION DIAGNOSIS  Inadequate oral intake related to slowed gastric emptying due to delayed tube feeding from previous night and nauseous from new started medications as evidenced by 0% intake for this morning's breakfast.     INTERVENTIONS  Implementation  Nutrition Education: Provided education on   -Increasing PO intake to help weaning from tube feeding.   -Order cold and liquid food if feeling nauseous.   -Obtain protein source from meat, dairy, and eggs.     Ordered room service assistance to help ordering high protein and high calorie food from menu.    Goals  Total avg nutritional intake to meet a minimum of 30 kcal/kg and 1.2 g PRO/kg daily (per dosing wt 78 kg).     Monitoring/Evaluation  Progress toward goals will be monitored and evaluated per protocol.    Ailyn Hickman  Dietetic Intern

## 2019-12-11 NOTE — PLAN OF CARE
"Discharge Planner OT   Patient plan for discharge: home with services - has Sandra Martinez for cleaning once a month, groceries delivered, nurse twice a week for med setup and vitals,PCA 3 hours per day for meal setup (mostly microwave) and other home management tasks  Current status: patient is now Mod I in room with SEC unless hooked to NG (then SBA), SBA  in hallways, SBA/Mod I full shower, SBA/IND dressing, SBA/IND toilet transfers, SBA/IND hygiene/grooming - patient with \"fogginess\" reported and requires cues for some safety and abdominal precautions at this time  Barriers to return to prior living situation: higher level cognition, limited support at home, post surgicval precautions, strength  Recommendations for discharge: home with continued services including SN/HHA/PT/OT  Rationale for recommendations: to increase ADL/IADL IND prior to home alone with PCA services       Entered by: Sara Garcia 12/11/2019 10:41 AM      "

## 2019-12-11 NOTE — PROGRESS NOTES
Transitional Care Unit  Extended Progress Note           Assessment and Plan:   Frandy Workman is a 54 year old male with a history of liver cirrhosis 2/2 ESTRADA, HCC (dx 2018), now s/p liver transplant on 11/11/19, HTN, HLD, type 2 DM, GERD, and BPH admitted to [facility] 12/2-12/10 for JERONIMO, nausea, diarrhea, confusion, and weakness.      # Weakness, deconditioning, confusion, FTT - Admitted from clinic on 12/2 after labs significant for JERONIMO and reports of nausea, vomiting, weakness, and poor appetite x 1 week.  His primary caregiver left town after he was discharged from the hospital following his transplant surgery, and now only has PCA care about 3hr/week.  Also seen by Psychiatry during hospitalization d/t thoughts of passive SI and difficulties w/ resuming his life post transplant though denied active plan.  Evaluated by OT during admission, MOCA score indicated cognitive impairment.     - PT, OT consults   - Nutrition plan as below     # S/p orthotopic liver transplant (11/11/19) 2/2 ESLD r/t ESTRADA, HCC  LFTs wnl, though w/ slight rise in AP to 162.  Donor HBcAb+, on tenofovir 100mg indefinitely.  Underwent upper endoscopy and colonoscopy on 12/6 d/t persistent nausea and diarrhea and found to have mild gastropathy, with duodenal, stomach, and colon bx + mycophenolate toxicity as of 12/10 per discussion with Transplant Team.  Negative for CMV and H.pylori.  Mycophenolate stopped as of evening 12/10 and started on Imuran 100mg daily per Transplant.  Last tacro level 7.9 on 12/10.             - IS:  Tacrolimus 4mg BID (goal 10-12) and Imuran 100mg daily   - Ppx:  Continue Bactrim, Tenofovir, and Valcyte   - Tacro levels M, Thur   - Continue PPI daily    # Type 2 DM - Last Hgb A1c 5.1% on 12/2.  Dx at age 29 and has required insulin since 2001.  Additionally, prior to transplant had been maintained on Tresiba 55 units, Metformin ER 500mg w/ supper, and Farxiga 10mg daily.  PTA regimen on hold, currently on Lantus 14  units daily, carb coverage 1u: 10g CHO, MSSI, and NPH in setting of resolving JERONIMO and malnutrition requiring TFs.     - Endocrine following, appreciate recommendations   - Continue Lantus 14 units daily  - Continue MSSI   - Continue carb coverage 1u: 10g CHO  - Continue NPH 20 units to be given w/ start of TF    - Insulin aspart 3 units at 0200 for AM hyperglycemia   - BG checks ac/hs and 0200   - Hypoglycemia protocol in place     # Diarrhea - Ongoing.  Possibly 2/2 mycophenolate toxicity given results of recent bx, as above.  CMV, H.pylori neg.  Previous stool LEONA negative.  C diff negative x 2 (12/2, 12/10).      # Severe protein calorie malnutrition in setting of acute on chronic illness - Poor PO intake prior to admission d/t nausea and confusion.  Started TF via NJ during hospitalization.    - Continue cycled TFs w/ Nepro   - Calorie counts per Nutrition   - Divided supplementat  - Continue supplementation w/ B12, multivitamins   - Encourage PO hydration   - BMP, Mag, Phos q Mon, Thur     # Adjustment disorder w/ depressed mood  # Hx bipolar I disorder   # Anxiety   Per chart review, had been on and off various mood stabilizers, antidepressants, and antipsychotics during early 20s and treated with lithium for ~ 15 years.  Seen by Psychiatry during hospitalization and started on Mirtazapine 15mg at HS.  Seen by Health Psychology during hospital stay, recommended CBT interventions and outpt follow up.    - Continue Mirtazapine 15mg at HS   - Will reconsult Health Psychology pending rehab course       Stable/chronic medical issues:   # HTN - BPs well controlled today.  Continue Coreg 12.5mg BID.    # HLD - Continue PTA statin and daily ASA.  # GERD - Continue daily PPI.    # BPH - Continue Flomax 0.4mg daily.    # Vitamin D deficiency - Continue supplementation 2000 units daily.   # Electrolyte derangements - In setting of recovering JERONIMO, malnutrition, diarrhea.  Continue neutra phos BID, sodium bicarb BID, and  "mag oxide BID.      Resolved hospital problems:  # JERONIMO - Cr elevated to 3.2 on hospital admission, improved to 1.52 as of 12/10.  Will continue to monitor renal function.  Trend BMP q Mon, Thur.  Avoid/minimize nephrotoxic agents and hypotension as able.  Encourage PO hydration.           Diet and/or tube feedings: Regular diet as tolerated and Nepro TF cycled 6p-6a  Lines, tubes, drains: NJ tube  DVT/GI prophylaxis: PCDs, mechanical   Indications for psychotropic medications: Bipolar 1 disorder, anxiety, adjustment disorder w/ depression   Pneumococcal Vaccination Status: PCV 13 given on 10/1/2019  Code status discussed on admission: Full Code      Melissa Larry, CNP, APRN  Internal Medicine [facility]  Pager [phone]         Consults:   PT, OT, Nutrition          History of Present Illness:   Frandy Workman is a 54 year old male with a history of liver cirrhosis 2/2 ESTRADA, HCC (dx 2018), now s/p liver transplant on 11/11/19, HTN, HLD, type 2 DM, GERD, and BPH admitted to [facility] 12/2-12/10 for JERONIMO, nausea, diarrhea, confusion, and weakness.      Currently, Miller is resting in bed.  He reports large emesis this AM due to pill burden.  He expressed frustration that he was not informed of changes to his immunosuppression regimen in a timely manner.  Also requesting up to date medication list.  Currently he feels okay but is apprehensive about attempting PO intake.  He denies cough, dyspnea, chest pain, and dizziness.  Still has small abdominal discomfort.  Continues to have loose stools.  No dysuria.           Physical Exam:   Blood pressure 127/63, temperature 96.7  F (35.9  C), temperature source Oral, resp. rate 16, height 1.753 m (5' 9\"), weight 77.6 kg (171 lb), SpO2 99 %.    GENERAL: Alert and oriented x 3. Well nourished, well developed.  No acute distress.    HEENT: Normocephalic, atraumatic. Anicteric sclera. Mucous membranes moist.   CV: RRR. S1, S2. No murmurs appreciated. "   RESPIRATORY: Effort normal on room air. Lungs CTAB with no wheezing, rales, or rhonchi.   GI: Abdomen soft and slightly distended, bowel sounds present x all 4 quadrants. No tenderness, rebound, or guarding. Incision site is well healing w/ pink granulation tissue.     NEUROLOGICAL: No focal deficits. Follows commands.  Strength equal in upper and lower extremities.   MUSCULOSKELETAL: No joint swelling or tenderness. Moves all extremities.   EXTREMITIES: No gross deformities. No peripheral edema.   SKIN: Grossly warm, dry, and intact. No jaundice. No rashes.            Past Medical History:     Past Medical History:   Diagnosis Date     Anemia 2013     Arthritis      BPH (benign prostatic hyperplasia)      CAD (coronary artery disease) 4/1/2019     Cholelithiasis      Conductive hearing loss 08/16/2017     Depressive disorder 1986    Suffer effects throughout life     Gastroesophageal reflux disease 12/01/2014     HCC (hepatocellular carcinoma) (H) 1/22/2019     History of diabetic retinopathy 07/2018     HTN (hypertension)      HTN (hypertension) 11/20/2019     Hyperlipidemia      Liver cirrhosis secondary to ESTRADA (H)      Liver transplanted (H) 11/11/2019     Portal vein thrombosis 4/11/2019     Type II diabetes mellitus (H)              Past Surgical History:      Past Surgical History:   Procedure Laterality Date     COLONOSCOPY      2015     COLONOSCOPY N/A 12/6/2019    Procedure: COLONOSCOPY, WITH POLYPECTOMY AND BIOPSY;  Surgeon: Adam Morton MD;  Location:  GI     CV HEART CATHETERIZATION WITH POSSIBLE INTERVENTION N/A 2/26/2019    Procedure: CORS;  Surgeon: Jagdish Hoyt MD;  Location:  HEART CARDIAC CATH LAB     ESOPHAGOSCOPY, GASTROSCOPY, DUODENOSCOPY (EGD), COMBINED N/A 11/17/2016    Procedure: COMBINED ESOPHAGOSCOPY, GASTROSCOPY, DUODENOSCOPY (EGD);  Surgeon: Santi Rosas MD;  Location:  GI     ESOPHAGOSCOPY, GASTROSCOPY, DUODENOSCOPY (EGD), COMBINED N/A 11/17/2017     Procedure: COMBINED ESOPHAGOSCOPY, GASTROSCOPY, DUODENOSCOPY (EGD);  EGD;  Surgeon: Santi Rosas MD;  Location:  GI     ESOPHAGOSCOPY, GASTROSCOPY, DUODENOSCOPY (EGD), COMBINED N/A 2018    Procedure: EGD;  Surgeon: Santi Rosas MD;  [facility]: [facility]     ESOPHAGOSCOPY, GASTROSCOPY, DUODENOSCOPY (EGD), COMBINED N/A 2019    Procedure: ESOPHAGOGASTRODUODENOSCOPY, WITH BIOPSY;  Surgeon: Adam Morton MD;  Location: [facility] GI     HEAD & NECK SURGERY      2017 at [facility].      IMPLANT GOLD WEIGHT EYELID Right 2017    Procedure: IMPLANT WEIGHT EYELID;  Right Upper Eyelid Weight, right tarsal strip lower eyelid;  Surgeon: Milana Malave MD;  Location: [facility] OR     IR CHEMO EMBOLIZATION  2019     KNEE SURGERY Left      ORTHOPEDIC SURGERY       PAROTIDECTOMY, RADICAL NECK DISSECTION Right 2017    Procedure: PAROTIDECTOMY, RADICAL NECK DISSECTION;  Right Superfacial Parotidectomy , Facial nerve repair. with [facility] facial nerve monitor.;  Surgeon: Asiya Morgan MD;  Location[facility] OR     PICC INSERTION Left 2017    4fr SL BioFlo PICC, 44cm in the L basilic vein w/ tip in the low SVC     RETURN LIVER TRANSPLANT N/A 2019    Procedure: Exploratory laparotomy, hematoma evacuation, abdominal washout;  Surgeon: Александр Ramos MD;  Location: [facility] OR     TRANSPLANT LIVER RECIPIENT  DONOR N/A 2019    Procedure: TRANSPLANT, LIVER, RECIPIENT,  DONOR;  Surgeon: Александр Ramos MD;  Location: [facility] OR     VASCULAR SURGERY               Family History:     Family History   Problem Relation Age of Onset     Prostate Cancer Maternal Grandfather      Substance Abuse Maternal Grandfather         Alcohol     Colon Cancer Father 60     Pancreatic Cancer Father 60     Prostate Cancer Father      Colorectal Cancer Father      Macular Degeneration Father      Cancer Father      Glaucoma Father      Skin Cancer Father      Colorectal Cancer Maternal Grandmother       Cancer Maternal Grandmother      Substance Abuse Maternal Grandmother         Alcohol     Colorectal Cancer Paternal Grandmother      Cancer Mother      Diabetes Mother          3/2016     Cerebrovascular Disease Mother         Passed away in Feb of this year, 80 years old.     Thyroid Disease Mother      Depression Mother      Asthma Sister         Had since birth     Thyroid Disease Sister      Depression Sister      Liver Disease No family hx of      Melanoma No family hx of              Social History:     Social History     Tobacco Use     Smoking status: Never Smoker     Smokeless tobacco: Former User     Types: Chew     Tobacco comment: 1 tin per week   Substance Use Topics     Alcohol use: No     Alcohol/week: 0.0 standard drinks     Comment: quit 1996             Medications:   No current facility-administered medications on file prior to encounter.   alpha-lipoic acid 600 MG capsule, Take 1 capsule (600 mg) by mouth daily  Artificial Tear Solution (SM ARTIFICIAL TEARS) SOLN, Place 1 drop into the right eye every hour as needed Apply at least 4 times daily and as needed for dry eye  aspirin (ASA) 325 MG EC tablet, Take 1 tablet (325 mg) by mouth daily  BD VIKTORIA U/F 32G X 4 MM insulin pen needle, Use 5 per day  blood glucose (ACCU-CHEK EDINSON PLUS) test strip, USE TO TEST FOUR TIMES DAILY OR AS DIRECTED  blood glucose monitoring (ACCU-CHEK EDINSON PLUS) test strip, Use to test blood sugar 4 times daily  blood glucose monitoring (ACCU-CHEK FASTCLIX) lancets, Use to test blood sugar 4 times daily or as directed.  1 box = 102 lancets  carvedilol (COREG) 25 MG tablet, Take 0.5 tablets (12.5 mg) by mouth 2 times daily (with meals)  econazole nitrate 1 % external cream, Apply topically daily To feet and toenails.  ferrous sulfate (FEROSUL) 325 (65 Fe) MG tablet, Take 1 tablet (325 mg) by mouth 3 times daily (with meals)  glucose (BD GLUCOSE) 4 g chewable tablet, Take 1 tablet by mouth every hour as  needed for low blood sugar  insulin aspart (NOVOLOG PEN) 100 UNIT/ML pen, Inject 1-7 Units Subcutaneous 3 times daily (before meals)  insulin aspart (NOVOLOG PEN) 100 UNIT/ML pen, Inject 1-7 Units Subcutaneous 3 times daily (with meals) DOSE:  1 units per 10 grams of carbohydrate.  Only chart total amount of units given.  Do not give if pre-prandial glucose is less than 60 mg/dL. If given at mealtime, administer within 30 minutes of start of meal  insulin aspart (NOVOLOG PEN) 100 UNIT/ML pen, Inject 1-7 Units Subcutaneous Take with snacks or supplements for high blood sugar DOSE:  1 units per 10 grams of carbohydrate. Only chart total amount of units given.  Do not give if pre-prandial glucose is less than 60 mg/dL. If given at mealtime, administer within 30 minutes of start of meal  insulin aspart (NOVOLOG PEN) 100 UNIT/ML pen, Inject 1-5 Units Subcutaneous 2 times daily  insulin aspart (NOVOLOG PEN) 100 UNIT/ML pen, Inject 2 Units Subcutaneous every 24 hours  insulin glargine (LANTUS PEN) 100 UNIT/ML pen, Inject 14 Units Subcutaneous every morning  insulin isophane human (HUMULIN N PEN) 100 UNIT/ML injection, Inject 20 Units Subcutaneous every evening  Lidocaine (LIDOCARE) 4 % Patch, Place 1 patch onto the skin every 24 hours To prevent lidocaine toxicity, patient should be patch free for 12 hrs daily.  loperamide (IMODIUM) 2 MG capsule, Take 1 capsule (2 mg) by mouth 4 times daily as needed for diarrhea  magnesium oxide (MAG-OX) 400 MG tablet, Take 1 tablet (400 mg) by mouth 2 times daily  mirtazapine (REMERON) 15 MG tablet, Take 1 tablet (15 mg) by mouth At Bedtime  Multiple Vitamin (THERAVITE PO), Take 1 tablet by mouth every morning   mycophenolic acid (GENERIC EQUIVALENT) 180 MG EC tablet, Take 3 tablets (540 mg) by mouth every 12 hours  omeprazole (PRILOSEC) 40 MG DR capsule, Take 1 capsule (40 mg) by mouth daily  ondansetron (ZOFRAN) 4 MG tablet, Take 1 tablet (4 mg) by mouth every 6 hours as needed for  nausea or vomiting  order for DME, Equipment being ordered: Cane ()  Treatment Diagnosis: impaired gait  phosphorus tablet 250 mg (PHOSPHA 250 NEUTRAL) 250 MG per tablet, Take 1 tablet (250 mg) by mouth 2 times daily  Prenatal Vit-Fe Fumarate-FA (PRENATAL MULTIVITAMIN W/IRON) 27-0.8 MG tablet, Take 1 tablet by mouth daily  prochlorperazine (COMPAZINE) 5 MG tablet, Take 1 tablet (5 mg) by mouth every 6 hours as needed for nausea or vomiting (use after Zofran)  rosuvastatin (CRESTOR) 5 MG tablet, Take 1 tablet (5 mg) by mouth daily  simethicone (MYLICON) 80 MG chewable tablet, Take 1 tablet (80 mg) by mouth every 6 hours as needed for cramping  sodium bicarbonate 650 MG tablet, Take 1 tablet (650 mg) by mouth 2 times daily  sodium chloride (OCEAN) 0.65 % nasal spray, Spray 1 spray into both nostrils every hour as needed for congestion  sulfamethoxazole-trimethoprim (BACTRIM/SEPTRA) 400-80 MG tablet, Take 1 tablet by mouth daily  tacrolimus (GENERIC EQUIVALENT) 1 MG capsule, Take 4 capsules (4 mg) by mouth 2 times daily  tamsulosin (FLOMAX) 0.4 MG capsule, TAKE 1 CAPSULE(0.4 MG) BY MOUTH DAILY  tenofovir (VIREAD) 300 MG tablet, Take 1 tablet (300 mg) by mouth daily  valGANciclovir (VALCYTE) 450 MG tablet, Take 1 tablet (450 mg) by mouth daily  vitamin B-12 (CYANOCOBALAMIN) 1000 MCG tablet, Take 1,000 mcg by mouth daily   vitamin D3 (CHOLECALCIFEROL) 2000 units (50 mcg) tablet, Take 1 tablet by mouth daily             Allergies:     Allergies   Allergen Reactions     Codeine Other (See Comments)     Cannot take due to liver  Cannot tolerate oral narcotics     Seasonal Allergies      Sneezing, coughing, runny and itchy eyes             Labs:     ROUTINE IP LABS (Last four results)  CMP   Recent Labs   Lab 12/10/19  0517 12/09/19  0620 12/08/19  0522 12/07/19  0555 12/06/19  0552    138 142 142 144   POTASSIUM 4.2 4.2 4.0 4.2 3.8   CHLORIDE 107 110* 114* 116* 116*   CO2 24 22 20 20 18*   ANIONGAP 6 6 8 7 11    * 232* 205* 203* 134*   BUN 34* 30 28 31* 33*   CR 1.52* 1.58* 1.53* 1.68* 1.87*   DEBORAH 8.6 8.7 8.2* 8.3* 8.4*   MAG 1.6 1.7 1.5* 1.3* 1.3*   PHOS 2.2*  --  2.6 2.0* 1.8*   PROTTOTAL 6.0* 5.9* 5.7* 5.8* 5.8*   ALBUMIN 2.4* 2.3* 2.2* 2.3* 2.3*   BILITOTAL 0.2 0.2 0.5 0.4 0.3   ALKPHOS 162* 155* 142 145 130   AST 17 16 22 18 13   ALT 29 28 24 22 19     CBC   Recent Labs   Lab 12/10/19  0517 12/09/19  0620 12/08/19  0522 12/07/19  0555   WBC 5.0 4.6 4.3 3.7*   RBC 2.35* 2.34* 2.35* 2.44*   HGB 7.1* 7.1* 7.2* 7.4*   HCT 21.5* 21.7* 21.4* 22.7*   MCV 92 93 91 93   MCH 30.2 30.3 30.6 30.3   MCHC 33.0 32.7 33.6 32.6   RDW 17.2* 17.3* 17.3* 17.7*   PLT 79* 78* 79* 99*     INR   Recent Labs   Lab 12/05/19  0824   INR 1.14

## 2019-12-11 NOTE — PLAN OF CARE
RN: New admit yesterday. Pt is alert and oriented. Has some minimal incisional pain r/t liver txp in November but denies need for pain med so far this shift. Initially complained of difficulty sleeping. Accepting to try environmental modification  (dim lights, quiet, door closed) and CARE channel- sleeping sound during succeeding rounds. Tolerating TF at 60 ml/hr via NJT - will be completed around 0800- instead of 0600 d/t delay start last evening. BG at 0200= 158- Has scheduled 3 unit(s) pf Insulin at 0200 as well to counteract hyperglycemia effects of overnight TF. Cdiff test came back neg. Continue with plan of care.

## 2019-12-11 NOTE — PROGRESS NOTES
"                     Diabetes Consult Daily  Progress Note          Assessment/Plan:     Mr. Miller Workman is a 55 yo man with a history of type 2 diabetes complicated by peripheral neuropathy and retinopathy, cirrhosis secondary to ESTRADA, HCC, HTN, HLD, GERD, BPH, who is s/p DBD liver transplant on 11/11/19, who was readmitted 12/2/19 with acute renal failure, confusion, and elevated tacrolimus level.   Transferred to TCU 12/22     Assessment:   1)Type II Diabetes Mellitus  2)Enteral feed hyperglycemia           Plan:   -glargine (sub for tresiba) 14 units daily AM  1:10g CHO  Novolog medium intensity sliding scale before meals and at bedtime  NPH 20 units at start of cycled tube feeds (1800)  Increase aspart to 3 units at 0200, to counter end-of-cycle hyperglycemia     Outpatient follow up: with [facility]  Plan discussed with patient.                                  Interval History:   The last 24 hours progress and nursing notes reviewed.  Blood sugars are variably and higher this am from previous.  TF were started at 8 pm versus 6 pm which is a contributing factor  Oral intake is minimal 2/2 feeling too full with cycled TF  Is working with therapies    Nutrition:  High calorie diet  -supplements with meals   Cycled TF: Nepro at 60 x 12 hours (6-6), providing 116 grams of CHO        PTA Diabetes Regimen:   Cat Amaniava Expert for glucose monitoring and calculating aspart doses-(settings input into meter for calculation, he only has to enter his BG and CHO intake). Settings: \"meal rise\"- 50mg/dL. Snack size-10g, active insulin time 3 hrs.           Current regimen:   -Lantus (sub for tresiba) 14 units daily  1:10g CHO  MDSSI AC, HS, 0200  NPH 20 units with cycled tube feeds  Held since transplant:   Metformin ER 500mg with supper  Farxiga 10mg daily    Recent Labs   Lab 12/11/19  1038 12/11/19  0805 12/11/19  0156 12/10/19  2111 12/10/19  1914 12/10/19  1655  12/10/19  0517  12/09/19  0620  " "12/08/19  0522  12/07/19  0555  12/06/19  0552  12/05/19  0605   GLC  --   --   --   --   --   --   --  203*  --  232*  --  205*  --  203*  --  134*  --  226*   * 210* 153* 152* 106* 90   < >  --    < >  --    < >  --    < >  --    < >  --    < >  --     < > = values in this interval not displayed.               Review of Systems:   See interval hx          Medications:     Orders Placed This Encounter      High Kcal/High Protein Diet, ADULT       Physical Exam:  Gen: Alert, resting in bed, in NAD   HEENT: mucous membranes are moist  Resp: non-labored  Ext: moving all extremities  Neuro:oriented x3, communicating clearly  BP (!) 146/67 (BP Location: Left arm)   Pulse 87   Temp 96.7  F (35.9  C) (Oral)   Resp 16   Ht 1.753 m (5' 9\")   Wt 77.6 kg (171 lb)   SpO2 99%   BMI 25.25 kg/m             Data:     Lab Results   Component Value Date    A1C 6.6 08/08/2018    A1C 6.5 06/09/2017    A1C 7.8 10/25/2016              CBC RESULTS:   Recent Labs   Lab Test 12/10/19  0517   WBC 5.0   RBC 2.35*   HGB 7.1*   HCT 21.5*   MCV 92   MCH 30.2   MCHC 33.0   RDW 17.2*   PLT 79*     Recent Labs   Lab Test 12/10/19  0517 12/09/19  0620    138   POTASSIUM 4.2 4.2   CHLORIDE 107 110*   CO2 24 22   ANIONGAP 6 6   * 232*   BUN 34* 30   CR 1.52* 1.58*   DEBORAH 8.6 8.7     Liver Function Studies -   Recent Labs   Lab Test 12/10/19  0517   PROTTOTAL 6.0*   ALBUMIN 2.4*   BILITOTAL 0.2   ALKPHOS 162*   AST 17   ALT 29     Lab Results   Component Value Date    INR 1.14 12/05/2019    INR 1.11 11/16/2019    INR 1.12 11/15/2019    INR 1.19 11/14/2019    INR 1.24 11/14/2019    INR 1.27 11/13/2019    INR 1.26 11/13/2019    INR 1.26 11/13/2019    INR 1.28 11/13/2019    INR 1.32 11/12/2019    INR 1.38 11/12/2019    INR 1.36 11/12/2019             Mechelle Kimball -[phone]  Diabetes Management job code [id number]            " +burning in throat and chest

## 2019-12-11 NOTE — PHARMACY-CONSULT NOTE
Pharmacy Tube Feeding Consult    Medication reviewed for administration by feeding tube and for potential food/drug interactions.    Recommendation: No changes are needed at this time.     Pharmacy will continue to follow as new medications are ordered.    Aracely Torres, LesD, BCPS

## 2019-12-12 ENCOUNTER — MYC MEDICAL ADVICE (OUTPATIENT)
Dept: INTERNAL MEDICINE | Facility: CLINIC | Age: 54
End: 2019-12-12

## 2019-12-12 LAB
ALBUMIN SERPL-MCNC: 2.6 G/DL (ref 3.4–5)
ALP SERPL-CCNC: 165 U/L (ref 40–150)
ALT SERPL W P-5'-P-CCNC: 32 U/L (ref 0–70)
ANION GAP SERPL CALCULATED.3IONS-SCNC: 6 MMOL/L (ref 3–14)
AST SERPL W P-5'-P-CCNC: 20 U/L (ref 0–45)
BILIRUB SERPL-MCNC: 0.4 MG/DL (ref 0.2–1.3)
BUN SERPL-MCNC: 38 MG/DL (ref 7–30)
CALCIUM SERPL-MCNC: 8.9 MG/DL (ref 8.5–10.1)
CHLORIDE SERPL-SCNC: 106 MMOL/L (ref 94–109)
CO2 SERPL-SCNC: 26 MMOL/L (ref 20–32)
CREAT SERPL-MCNC: 1.49 MG/DL (ref 0.66–1.25)
ERYTHROCYTE [DISTWIDTH] IN BLOOD BY AUTOMATED COUNT: 17.3 % (ref 10–15)
GFR SERPL CREATININE-BSD FRML MDRD: 52 ML/MIN/{1.73_M2}
GLUCOSE BLDC GLUCOMTR-MCNC: 124 MG/DL (ref 70–99)
GLUCOSE BLDC GLUCOMTR-MCNC: 149 MG/DL (ref 70–99)
GLUCOSE BLDC GLUCOMTR-MCNC: 175 MG/DL (ref 70–99)
GLUCOSE BLDC GLUCOMTR-MCNC: 181 MG/DL (ref 70–99)
GLUCOSE BLDC GLUCOMTR-MCNC: 187 MG/DL (ref 70–99)
GLUCOSE BLDC GLUCOMTR-MCNC: 198 MG/DL (ref 70–99)
GLUCOSE BLDC GLUCOMTR-MCNC: 226 MG/DL (ref 70–99)
GLUCOSE SERPL-MCNC: 198 MG/DL (ref 70–99)
HCT VFR BLD AUTO: 23.4 % (ref 40–53)
HGB BLD-MCNC: 7.5 G/DL (ref 13.3–17.7)
MAGNESIUM SERPL-MCNC: 1.7 MG/DL (ref 1.6–2.3)
MCH RBC QN AUTO: 30.1 PG (ref 26.5–33)
MCHC RBC AUTO-ENTMCNC: 32.1 G/DL (ref 31.5–36.5)
MCV RBC AUTO: 94 FL (ref 78–100)
PHOSPHATE SERPL-MCNC: 2.8 MG/DL (ref 2.5–4.5)
PLATELET # BLD AUTO: 85 10E9/L (ref 150–450)
POTASSIUM SERPL-SCNC: 5.1 MMOL/L (ref 3.4–5.3)
PROT SERPL-MCNC: 6.5 G/DL (ref 6.8–8.8)
RBC # BLD AUTO: 2.49 10E12/L (ref 4.4–5.9)
SODIUM SERPL-SCNC: 138 MMOL/L (ref 133–144)
TACROLIMUS BLD-MCNC: 8.2 UG/L (ref 5–15)
TME LAST DOSE: NORMAL H
WBC # BLD AUTO: 4.9 10E9/L (ref 4–11)

## 2019-12-12 PROCEDURE — 12000022 ZZH R&B SNF

## 2019-12-12 PROCEDURE — 85027 COMPLETE CBC AUTOMATED: CPT | Performed by: NURSE PRACTITIONER

## 2019-12-12 PROCEDURE — 36415 COLL VENOUS BLD VENIPUNCTURE: CPT | Performed by: NURSE PRACTITIONER

## 2019-12-12 PROCEDURE — 97535 SELF CARE MNGMENT TRAINING: CPT | Mod: GO | Performed by: OCCUPATIONAL THERAPIST

## 2019-12-12 PROCEDURE — 80197 ASSAY OF TACROLIMUS: CPT | Performed by: NURSE PRACTITIONER

## 2019-12-12 PROCEDURE — 25000132 ZZH RX MED GY IP 250 OP 250 PS 637: Mod: GY | Performed by: NURSE PRACTITIONER

## 2019-12-12 PROCEDURE — 97116 GAIT TRAINING THERAPY: CPT | Mod: GP

## 2019-12-12 PROCEDURE — 83735 ASSAY OF MAGNESIUM: CPT | Performed by: NURSE PRACTITIONER

## 2019-12-12 PROCEDURE — 25000131 ZZH RX MED GY IP 250 OP 636 PS 637: Mod: GY | Performed by: PHYSICIAN ASSISTANT

## 2019-12-12 PROCEDURE — 25000131 ZZH RX MED GY IP 250 OP 636 PS 637: Mod: GY | Performed by: HOSPITALIST

## 2019-12-12 PROCEDURE — 25000131 ZZH RX MED GY IP 250 OP 636 PS 637: Mod: GY | Performed by: NURSE PRACTITIONER

## 2019-12-12 PROCEDURE — 84100 ASSAY OF PHOSPHORUS: CPT | Performed by: NURSE PRACTITIONER

## 2019-12-12 PROCEDURE — 97110 THERAPEUTIC EXERCISES: CPT | Mod: GP

## 2019-12-12 PROCEDURE — 00000146 ZZHCL STATISTIC GLUCOSE BY METER IP

## 2019-12-12 PROCEDURE — 25000132 ZZH RX MED GY IP 250 OP 250 PS 637: Mod: GY | Performed by: HOSPITALIST

## 2019-12-12 PROCEDURE — 99305 1ST NF CARE MODERATE MDM 35: CPT | Performed by: HOSPITALIST

## 2019-12-12 PROCEDURE — 80053 COMPREHEN METABOLIC PANEL: CPT | Performed by: NURSE PRACTITIONER

## 2019-12-12 PROCEDURE — 25000128 H RX IP 250 OP 636: Performed by: NURSE PRACTITIONER

## 2019-12-12 RX ADMIN — CYANOCOBALAMIN TAB 1000 MCG 1000 MCG: 1000 TAB at 17:29

## 2019-12-12 RX ADMIN — FERROUS SULFATE TAB 325 MG (65 MG ELEMENTAL FE) 325 MG: 325 (65 FE) TAB at 12:48

## 2019-12-12 RX ADMIN — INSULIN ASPART 2 UNITS: 100 INJECTION, SOLUTION INTRAVENOUS; SUBCUTANEOUS at 12:48

## 2019-12-12 RX ADMIN — MAGNESIUM OXIDE TAB 400 MG (241.3 MG ELEMENTAL MG) 400 MG: 400 (241.3 MG) TAB at 21:32

## 2019-12-12 RX ADMIN — ONDANSETRON 4 MG: 4 TABLET, ORALLY DISINTEGRATING ORAL at 08:28

## 2019-12-12 RX ADMIN — INSULIN GLARGINE 14 UNITS: 100 INJECTION, SOLUTION SUBCUTANEOUS at 08:39

## 2019-12-12 RX ADMIN — VALGANCICLOVIR 450 MG: 450 TABLET, FILM COATED ORAL at 08:34

## 2019-12-12 RX ADMIN — ROSUVASTATIN CALCIUM 5 MG: 5 TABLET, FILM COATED ORAL at 21:31

## 2019-12-12 RX ADMIN — TACROLIMUS 4 MG: 1 CAPSULE ORAL at 09:25

## 2019-12-12 RX ADMIN — SODIUM BICARBONATE 650 MG TABLET 650 MG: at 21:31

## 2019-12-12 RX ADMIN — THERA TABS 1 TABLET: TAB at 17:29

## 2019-12-12 RX ADMIN — DIBASIC SODIUM PHOSPHATE, MONOBASIC POTASSIUM PHOSPHATE AND MONOBASIC SODIUM PHOSPHATE 250 MG: 852; 155; 130 TABLET ORAL at 08:34

## 2019-12-12 RX ADMIN — OMEPRAZOLE 40 MG: 20 CAPSULE, DELAYED RELEASE ORAL at 08:34

## 2019-12-12 RX ADMIN — INSULIN ASPART 1 UNITS: 100 INJECTION, SOLUTION INTRAVENOUS; SUBCUTANEOUS at 08:39

## 2019-12-12 RX ADMIN — FERROUS SULFATE TAB 325 MG (65 MG ELEMENTAL FE) 325 MG: 325 (65 FE) TAB at 08:34

## 2019-12-12 RX ADMIN — MICONAZOLE NITRATE: 20 CREAM TOPICAL at 21:35

## 2019-12-12 RX ADMIN — TAMSULOSIN HYDROCHLORIDE 0.4 MG: 0.4 CAPSULE ORAL at 21:32

## 2019-12-12 RX ADMIN — MICONAZOLE NITRATE: 20 CREAM TOPICAL at 08:40

## 2019-12-12 RX ADMIN — CARVEDILOL 12.5 MG: 3.12 TABLET, FILM COATED ORAL at 17:33

## 2019-12-12 RX ADMIN — Medication 1 PACKET: at 08:40

## 2019-12-12 RX ADMIN — MELATONIN 2000 UNITS: at 17:29

## 2019-12-12 RX ADMIN — SULFAMETHOXAZOLE AND TRIMETHOPRIM 1 TABLET: 400; 80 TABLET ORAL at 08:34

## 2019-12-12 RX ADMIN — DIBASIC SODIUM PHOSPHATE, MONOBASIC POTASSIUM PHOSPHATE AND MONOBASIC SODIUM PHOSPHATE 250 MG: 852; 155; 130 TABLET ORAL at 21:31

## 2019-12-12 RX ADMIN — MAGNESIUM OXIDE TAB 400 MG (241.3 MG ELEMENTAL MG) 400 MG: 400 (241.3 MG) TAB at 08:33

## 2019-12-12 RX ADMIN — ACETAMINOPHEN 650 MG: 325 TABLET, FILM COATED ORAL at 21:32

## 2019-12-12 RX ADMIN — MIRTAZAPINE 15 MG: 7.5 TABLET, FILM COATED ORAL at 21:31

## 2019-12-12 RX ADMIN — FERROUS SULFATE TAB 325 MG (65 MG ELEMENTAL FE) 325 MG: 325 (65 FE) TAB at 17:29

## 2019-12-12 RX ADMIN — SODIUM BICARBONATE 650 MG TABLET 650 MG: at 08:33

## 2019-12-12 RX ADMIN — Medication 600 MG: at 17:28

## 2019-12-12 RX ADMIN — CARVEDILOL 12.5 MG: 3.12 TABLET, FILM COATED ORAL at 08:34

## 2019-12-12 RX ADMIN — TENOFOVIR DISOPROXIL FUMARATE 300 MG: 300 TABLET, COATED ORAL at 08:34

## 2019-12-12 RX ADMIN — TACROLIMUS 4.5 MG: 1 CAPSULE ORAL at 21:30

## 2019-12-12 RX ADMIN — AZATHIOPRINE 100 MG: 50 TABLET ORAL at 08:33

## 2019-12-12 RX ADMIN — PRENATAL VITAMINS-IRON FUMARATE 27 MG IRON-FOLIC ACID 0.8 MG TABLET 1 TABLET: at 08:33

## 2019-12-12 RX ADMIN — ASPIRIN 325 MG: 325 TABLET, DELAYED RELEASE ORAL at 08:33

## 2019-12-12 ASSESSMENT — MIFFLIN-ST. JEOR: SCORE: 1581.53

## 2019-12-12 NOTE — H&P
H&P by Yasmine Menon MD was reviewed. I agree with assessment and plan as documented.       Melissa Larry CNP, APRN  Internal Medicine [facility]  Pager [phone]

## 2019-12-12 NOTE — H&P
Transitional Care Unit  Extended Progress Note           Assessment and Plan:   Frandy Workman is a 54 year old male with a history of liver cirrhosis 2/2 ESTRADA, HCC (dx 2018), now s/p liver transplant on 11/11/19, HTN, HLD, type 2 DM, GERD, and BPH admitted to [facility] 12/2-12/10 for JERONIMO, nausea, diarrhea, confusion, and weakness.      # Weakness, deconditioning, confusion, FTT - Admitted from clinic on 12/2 after labs significant for JERONIMO and reports of nausea, vomiting, weakness, and poor appetite x 1 week.  His primary caregiver left town after he was discharged from the hospital following his transplant surgery, and now only has PCA care about 3hr/week.  Also seen by Psychiatry during hospitalization d/t thoughts of passive SI and difficulties w/ resuming his life post transplant though denied active plan.  Evaluated by OT during admission, MOCA score indicated cognitive impairment.       - I wonder if nausea is related to Mycophenolate GI toxicity or medication related  - Drink fluids  - PT, OT consults   - Nutrition plan as below     # S/p orthotopic liver transplant (11/11/19) 2/2 ESLD r/t ESTRADA, HCC  LFTs wnl, though w/ slight rise in AP to 162.  Donor HBcAb+, on tenofovir 100mg indefinitely.  Underwent upper endoscopy and colonoscopy on 12/6 d/t persistent nausea and diarrhea and found to have mild gastropathy, with duodenal, stomach, and colon bx + mycophenolate toxicity as of 12/10 per discussion with Transplant Team.  Negative for CMV and H.pylori.  Mycophenolate stopped as of evening 12/10 and started on Imuran 100mg daily per Transplant.  Last tacro level 7.9 on 12/10.      - Off mycophenolate           - IS:  Tacrolimus 4mg BID (goal 10-12) and Imuran 100mg daily   - Ppx:  Continue Bactrim, Tenofovir, and Valcyte   - Tacro levels M, Thur   - Continue PPI daily    # Type 2 DM - Last Hgb A1c 5.1% on 12/2.  Dx at age 29 and has required insulin since 2001.  Additionally, prior to transplant had been  maintained on Tresiba 55 units, Metformin ER 500mg w/ supper, and Farxiga 10mg daily.  PTA regimen on hold, currently on Lantus 14 units daily, carb coverage 1u: 10g CHO, MSSI, and NPH in setting of resolving JERONIMO and malnutrition requiring TFs.     - Endocrine following, appreciate recommendations   - Per endocrine    # Diarrhea - Ongoing.  Possibly 2/2 mycophenolate toxicity given results of recent bx, as above.  CMV, H.pylori neg.  Previous stool LEONA negative.  C diff negative x 2 (12/2, 12/10).  Some time the TF can do it too. On modular fiber    # Severe protein calorie malnutrition in setting of acute on chronic illness - Poor PO intake prior to admission d/t nausea and confusion.  Started TF via NJ during hospitalization.    - Continue cycled TFs w/ Nepro   - Calorie counts per Nutrition   - Divided supplementat  - Continue supplementation w/ B12, multivitamins   - Encourage PO hydration   - BMP, Mag, Phos q Mon, Thur     # Adjustment disorder w/ depressed mood  # Hx bipolar I disorder   # Anxiety   Per chart review, had been on and off various mood stabilizers, antidepressants, and antipsychotics during early 20s and treated with lithium for ~ 15 years.  Seen by Psychiatry during hospitalization and started on Mirtazapine 15mg at HS.  Seen by Health Psychology during hospital stay, recommended CBT interventions and outpt follow up.    - Continue Mirtazapine 15mg at HS   - Will reconsult Health Psychology pending rehab course       Stable/chronic medical issues:   # HTN - BPs well controlled today.  Continue Coreg 12.5mg BID.    # HLD - Continue PTA statin and daily ASA.  # GERD - Continue daily PPI.    # BPH - Continue Flomax 0.4mg daily.    # Vitamin D deficiency - Continue supplementation 2000 units daily.   # Electrolyte derangements - In setting of recovering JERONIMO, malnutrition, diarrhea.  Continue neutra phos BID, sodium bicarb BID, and mag oxide BID.      Resolved hospital problems:  # JERONIMO - Cr elevated  "to 3.2 on hospital admission, improved to 1.52 as of 12/10.  Will continue to monitor renal function.  Trend BMP q Mon, Thur.  Avoid/minimize nephrotoxic agents and hypotension as able.  Encourage PO hydration.           Diet and/or tube feedings: Regular diet as tolerated and Nepro TF cycled 6p-6a  Lines, tubes, drains: NJ tube  DVT/GI prophylaxis: PCDs, would want to add lovenox but low platelet count and recent GI biopsy and CKD, he is not best candidate. Keep on SCDs, mobilize, drink fluids.   Indications for psychotropic medications: Bipolar 1 disorder, anxiety, adjustment disorder w/ depression   Pneumococcal Vaccination Status: PCV 13 given on 10/1/2019  Code status discussed on admission: Full Code             Consults:   PT, OT, Nutrition          History of Present Illness:   Frandy Workman is a 54 year old male with a history of liver cirrhosis 2/2 ESTRADA, HCC (dx 2018), now s/p liver transplant on 11/11/19, HTN, HLD, type 2 DM, GERD, and BPH admitted to [facility] 12/2-12/10 for JERONIMO, nausea, diarrhea, confusion, and weakness.      Currently, Miller is resting in bed.  He reports large emesis this AM due to pill burden.  He expressed frustration that he was not informed of changes to his immunosuppression regimen in a timely manner.  Also requesting up to date medication list.  Currently he feels okay but is apprehensive about attempting PO intake.  He denies cough, dyspnea, chest pain, and dizziness.  Still has small abdominal discomfort.  Continues to have loose stools.  No dysuria.           Physical Exam:   Blood pressure 123/69, pulse 92, temperature 98.7  F (37.1  C), temperature source Oral, resp. rate 18, height 1.753 m (5' 9\"), weight 75.6 kg (166 lb 11.2 oz), SpO2 96 %.    GENERAL: Alert and oriented x 3. Well nourished, well developed.  No acute distress.    HEENT: Normocephalic, atraumatic. Anicteric sclera. Mucous membranes moist.   CV: RRR. S1, S2. No murmurs appreciated.   RESPIRATORY: Effort normal " on room air. Lungs CTAB with no wheezing, rales, or rhonchi.   GI: Abdomen soft and slightly distended, bowel sounds present x all 4 quadrants. No tenderness, rebound, or guarding. Incision site is well healing w/ pink granulation tissue.     NEUROLOGICAL: No focal deficits. Follows commands.  Strength equal in upper and lower extremities.   MUSCULOSKELETAL: No joint swelling or tenderness. Moves all extremities.   EXTREMITIES: No gross deformities. No peripheral edema.   SKIN: Grossly warm, dry, and intact. No jaundice. No rashes.            Past Medical History:     Past Medical History:   Diagnosis Date     Anemia 2013     Arthritis      BPH (benign prostatic hyperplasia)      CAD (coronary artery disease) 4/1/2019     Cholelithiasis      Conductive hearing loss 08/16/2017     Depressive disorder 1986    Suffer effects throughout life     Gastroesophageal reflux disease 12/01/2014     HCC (hepatocellular carcinoma) (H) 1/22/2019     History of diabetic retinopathy 07/2018     HTN (hypertension)      HTN (hypertension) 11/20/2019     Hyperlipidemia      Liver cirrhosis secondary to ESTRADA (H)      Liver transplanted (H) 11/11/2019     Portal vein thrombosis 4/11/2019     Type II diabetes mellitus (H)              Past Surgical History:      Past Surgical History:   Procedure Laterality Date     COLONOSCOPY      2015     COLONOSCOPY N/A 12/6/2019    Procedure: COLONOSCOPY, WITH POLYPECTOMY AND BIOPSY;  Surgeon: Adam Morton MD;  Location:  GI     CV HEART CATHETERIZATION WITH POSSIBLE INTERVENTION N/A 2/26/2019    Procedure: CORS;  Surgeon: Jagdish Hoyt MD;  Location:  HEART CARDIAC CATH LAB     ESOPHAGOSCOPY, GASTROSCOPY, DUODENOSCOPY (EGD), COMBINED N/A 11/17/2016    Procedure: COMBINED ESOPHAGOSCOPY, GASTROSCOPY, DUODENOSCOPY (EGD);  Surgeon: Santi Rosas MD;  Location:  GI     ESOPHAGOSCOPY, GASTROSCOPY, DUODENOSCOPY (EGD), COMBINED N/A 11/17/2017    Procedure: COMBINED  ESOPHAGOSCOPY, GASTROSCOPY, DUODENOSCOPY (EGD);  EGD;  Surgeon: Santi Rosas MD;  Location: [facility] GI     ESOPHAGOSCOPY, GASTROSCOPY, DUODENOSCOPY (EGD), COMBINED N/A 2018    Procedure: EGD;  Surgeon: Santi Rosas MD;  Location: [facility] OR     ESOPHAGOSCOPY, GASTROSCOPY, DUODENOSCOPY (EGD), COMBINED N/A 2019    Procedure: ESOPHAGOGASTRODUODENOSCOPY, WITH BIOPSY;  Surgeon: Adam Morton MD;  Location: [facility] GI     HEAD & NECK SURGERY      2017 at [facility].      IMPLANT GOLD WEIGHT EYELID Right 2017    Procedure: IMPLANT WEIGHT EYELID;  Right Upper Eyelid Weight, right tarsal strip lower eyelid;  Surgeon: Milana Malave MD;  Location: [facility] OR     IR CHEMO EMBOLIZATION  2019     KNEE SURGERY Left      ORTHOPEDIC SURGERY       PAROTIDECTOMY, RADICAL NECK DISSECTION Right 2017    Procedure: PAROTIDECTOMY, RADICAL NECK DISSECTION;  Right Superfacial Parotidectomy , Facial nerve repair. with [facility] facial nerve monitor.;  Surgeon: Asiya Morgan MD;  Location: [facility] OR     PICC INSERTION Left 2017    4fr SL BioFlo PICC, 44cm in the L basilic vein w/ tip in the low SVC     RETURN LIVER TRANSPLANT N/A 2019    Procedure: Exploratory laparotomy, hematoma evacuation, abdominal washout;  Surgeon: Александр Ramos MD;  Location: [facility] OR     TRANSPLANT LIVER RECIPIENT  DONOR N/A 2019    Procedure: TRANSPLANT, LIVER, RECIPIENT,  DONOR;  Surgeon: Александр Ramos MD;  Location: [facility] OR     VASCULAR SURGERY               Family History:     Family History   Problem Relation Age of Onset     Prostate Cancer Maternal Grandfather      Substance Abuse Maternal Grandfather         Alcohol     Colon Cancer Father 60     Pancreatic Cancer Father 60     Prostate Cancer Father      Colorectal Cancer Father      Macular Degeneration Father      Cancer Father      Glaucoma Father      Skin Cancer Father      Colorectal Cancer Maternal Grandmother      Cancer Maternal  Grandmother      Substance Abuse Maternal Grandmother         Alcohol     Colorectal Cancer Paternal Grandmother      Cancer Mother      Diabetes Mother          3/2016     Cerebrovascular Disease Mother         Passed away in Feb of this year, 80 years old.     Thyroid Disease Mother      Depression Mother      Asthma Sister         Had since birth     Thyroid Disease Sister      Depression Sister      Liver Disease No family hx of      Melanoma No family hx of              Social History:     Social History     Tobacco Use     Smoking status: Never Smoker     Smokeless tobacco: Former User     Types: Chew     Tobacco comment: 1 tin per week   Substance Use Topics     Alcohol use: No     Alcohol/week: 0.0 standard drinks     Comment: quit 1996             Medications:   No current facility-administered medications on file prior to encounter.   alpha-lipoic acid 600 MG capsule, Take 1 capsule (600 mg) by mouth daily  Artificial Tear Solution (SM ARTIFICIAL TEARS) SOLN, Place 1 drop into the right eye every hour as needed Apply at least 4 times daily and as needed for dry eye  aspirin (ASA) 325 MG EC tablet, Take 1 tablet (325 mg) by mouth daily  BD VIKTORIA U/F 32G X 4 MM insulin pen needle, Use 5 per day  blood glucose (ACCU-CHEK EDINSON PLUS) test strip, USE TO TEST FOUR TIMES DAILY OR AS DIRECTED  blood glucose monitoring (ACCU-CHEK EDINSON PLUS) test strip, Use to test blood sugar 4 times daily  blood glucose monitoring (ACCU-CHEK FASTCLIX) lancets, Use to test blood sugar 4 times daily or as directed.  1 box = 102 lancets  carvedilol (COREG) 25 MG tablet, Take 0.5 tablets (12.5 mg) by mouth 2 times daily (with meals)  econazole nitrate 1 % external cream, Apply topically daily To feet and toenails.  ferrous sulfate (FEROSUL) 325 (65 Fe) MG tablet, Take 1 tablet (325 mg) by mouth 3 times daily (with meals)  glucose (BD GLUCOSE) 4 g chewable tablet, Take 1 tablet by mouth every hour as needed for low blood  sugar  insulin aspart (NOVOLOG PEN) 100 UNIT/ML pen, Inject 1-7 Units Subcutaneous 3 times daily (before meals)  insulin aspart (NOVOLOG PEN) 100 UNIT/ML pen, Inject 1-7 Units Subcutaneous 3 times daily (with meals) DOSE:  1 units per 10 grams of carbohydrate.  Only chart total amount of units given.  Do not give if pre-prandial glucose is less than 60 mg/dL. If given at mealtime, administer within 30 minutes of start of meal  insulin aspart (NOVOLOG PEN) 100 UNIT/ML pen, Inject 1-7 Units Subcutaneous Take with snacks or supplements for high blood sugar DOSE:  1 units per 10 grams of carbohydrate. Only chart total amount of units given.  Do not give if pre-prandial glucose is less than 60 mg/dL. If given at mealtime, administer within 30 minutes of start of meal  insulin aspart (NOVOLOG PEN) 100 UNIT/ML pen, Inject 1-5 Units Subcutaneous 2 times daily  insulin aspart (NOVOLOG PEN) 100 UNIT/ML pen, Inject 2 Units Subcutaneous every 24 hours  insulin glargine (LANTUS PEN) 100 UNIT/ML pen, Inject 14 Units Subcutaneous every morning  insulin isophane human (HUMULIN N PEN) 100 UNIT/ML injection, Inject 20 Units Subcutaneous every evening  Lidocaine (LIDOCARE) 4 % Patch, Place 1 patch onto the skin every 24 hours To prevent lidocaine toxicity, patient should be patch free for 12 hrs daily.  loperamide (IMODIUM) 2 MG capsule, Take 1 capsule (2 mg) by mouth 4 times daily as needed for diarrhea  magnesium oxide (MAG-OX) 400 MG tablet, Take 1 tablet (400 mg) by mouth 2 times daily  mirtazapine (REMERON) 15 MG tablet, Take 1 tablet (15 mg) by mouth At Bedtime  Multiple Vitamin (THERAVITE PO), Take 1 tablet by mouth every morning   mycophenolic acid (GENERIC EQUIVALENT) 180 MG EC tablet, Take 3 tablets (540 mg) by mouth every 12 hours  omeprazole (PRILOSEC) 40 MG DR capsule, Take 1 capsule (40 mg) by mouth daily  ondansetron (ZOFRAN) 4 MG tablet, Take 1 tablet (4 mg) by mouth every 6 hours as needed for nausea or  vomiting  order for DME, Equipment being ordered: Cane ()  Treatment Diagnosis: impaired gait  phosphorus tablet 250 mg (PHOSPHA 250 NEUTRAL) 250 MG per tablet, Take 1 tablet (250 mg) by mouth 2 times daily  Prenatal Vit-Fe Fumarate-FA (PRENATAL MULTIVITAMIN W/IRON) 27-0.8 MG tablet, Take 1 tablet by mouth daily  prochlorperazine (COMPAZINE) 5 MG tablet, Take 1 tablet (5 mg) by mouth every 6 hours as needed for nausea or vomiting (use after Zofran)  rosuvastatin (CRESTOR) 5 MG tablet, Take 1 tablet (5 mg) by mouth daily  simethicone (MYLICON) 80 MG chewable tablet, Take 1 tablet (80 mg) by mouth every 6 hours as needed for cramping  sodium bicarbonate 650 MG tablet, Take 1 tablet (650 mg) by mouth 2 times daily  sodium chloride (OCEAN) 0.65 % nasal spray, Spray 1 spray into both nostrils every hour as needed for congestion  sulfamethoxazole-trimethoprim (BACTRIM/SEPTRA) 400-80 MG tablet, Take 1 tablet by mouth daily  tacrolimus (GENERIC EQUIVALENT) 1 MG capsule, Take 4 capsules (4 mg) by mouth 2 times daily  tamsulosin (FLOMAX) 0.4 MG capsule, TAKE 1 CAPSULE(0.4 MG) BY MOUTH DAILY  tenofovir (VIREAD) 300 MG tablet, Take 1 tablet (300 mg) by mouth daily  valGANciclovir (VALCYTE) 450 MG tablet, Take 1 tablet (450 mg) by mouth daily  vitamin B-12 (CYANOCOBALAMIN) 1000 MCG tablet, Take 1,000 mcg by mouth daily   vitamin D3 (CHOLECALCIFEROL) 2000 units (50 mcg) tablet, Take 1 tablet by mouth daily             Allergies:     Allergies   Allergen Reactions     Codeine Other (See Comments)     Cannot take due to liver  Cannot tolerate oral narcotics     Seasonal Allergies      Sneezing, coughing, runny and itchy eyes             Labs:     ROUTINE IP LABS (Last four results)  CMP   Recent Labs   Lab 12/12/19  0730 12/10/19  0517 12/09/19  0620 12/08/19  0522 12/07/19  0555    138 138 142 142   POTASSIUM 5.1 4.2 4.2 4.0 4.2   CHLORIDE 106 107 110* 114* 116*   CO2 26 24 22 20 20   ANIONGAP 6 6 6 8 7   * 203*  232* 205* 203*   BUN 38* 34* 30 28 31*   CR 1.49* 1.52* 1.58* 1.53* 1.68*   DEBORAH 8.9 8.6 8.7 8.2* 8.3*   MAG 1.7 1.6 1.7 1.5* 1.3*   PHOS 2.8 2.2*  --  2.6 2.0*   PROTTOTAL 6.5* 6.0* 5.9* 5.7* 5.8*   ALBUMIN 2.6* 2.4* 2.3* 2.2* 2.3*   BILITOTAL 0.4 0.2 0.2 0.5 0.4   ALKPHOS 165* 162* 155* 142 145   AST 20 17 16 22 18   ALT 32 29 28 24 22     CBC   Recent Labs   Lab 12/12/19  0730 12/10/19  0517 12/09/19  0620 12/08/19  0522   WBC 4.9 5.0 4.6 4.3   RBC 2.49* 2.35* 2.34* 2.35*   HGB 7.5* 7.1* 7.1* 7.2*   HCT 23.4* 21.5* 21.7* 21.4*   MCV 94 92 93 91   MCH 30.1 30.2 30.3 30.6   MCHC 32.1 33.0 32.7 33.6   RDW 17.3* 17.2* 17.3* 17.3*   PLT 85* 79* 78* 79*     INR   No lab results found in last 7 days.

## 2019-12-12 NOTE — TELEPHONE ENCOUNTER
Chart reviewed. Pt in [facility] at this time.Will follow.  .Dennise Augustin RN 7:24 AM on 12/12/2019.    Had a fever was 98.9 from 01/01 to 01/06/20. BS on 01/04/20 up and down-.   Pt states he feels better every day. States it is hard to eat additional 800 calories a day.  No other c/o's.  Clinic numbers given for questions or concerns.  Dennise Augustin RN 9:27 AM on 1/7/2020.

## 2019-12-12 NOTE — PLAN OF CARE
RN: Pt has no c/o pain or discomfort so far this shift. Tolerating TF at 60 ml/hr via NJT--- will be completed at 0600. BG at 0200= 187- Has scheduled 3 unit(s) pf Insulin at 0200  to counteract hyperglycemia effects of overnight TF. Cdiff test came back neg. Continue with plan of care.

## 2019-12-12 NOTE — PROGRESS NOTES
Immunosuppression Note:     ESLD 2/2 ESTRADA/HCC. DD OLT 11/11/19. LFTs stable.     IS:   Imuran 100 mg daily. Will plan to increase next week if WBC stable. Changed from Cellcept due to MMF toxicity on duodenal biopsy.   Tacrolimus 4.5 mg BID. 12/12 level 8.2 (11 hour trough). Increased dose from 4 to 4.5 mg BID.     Maryanne Recio PA-C [id number]

## 2019-12-12 NOTE — PLAN OF CARE
Pt A&Ox4, able to make needs known. Denies pain and SOB. Given PRN zofran for nausea with morning meds. Fair appetite- ate most of breakfast and lunch without complaint of nausea. BG checks 181, 226 and 149. LBM this am. Independent in room. Call light within reach, will continue to monitor.

## 2019-12-12 NOTE — PLAN OF CARE
Pt VSS. Denied pain today. C/o nausea and fullness, given PRN zofran and simethicone x1 with evening meds. TF started at 1830, running per orders. Pt did not eat dinner, as ate late lunch. Drank 1 boost this shift. Pt is one calorie counts, keeping track. Pt had 2 BM this shift. Urinating into urinal, good output. Pt  and 180, treated with sliding scale insulin and carb coverage. Pt calls appropriately.

## 2019-12-12 NOTE — PROGRESS NOTES
Calorie Counts    12/10: 132 kcal and 8 g protein (1 meals, 1 supplements)  12/11: 690 kcal and 37 g protein (2 meals, 2 supplements)  12/12: 400 kcal and 27 g protein (Breakfast only. Pt was eating lunch per visit. 2 meals, 2 supplements)    Ailyn Hickman  Dietetic Intern

## 2019-12-12 NOTE — PROGRESS NOTES
12/12/19 1400   General Information   Patient Profile Review See Profile for full history and prior level of function   Observations Upon arrival patient having frustrations with medications and appointments. Unable to complete full assessment d/t pt not focused on recreational activities.   Media   Computer Will use patient computer;Will use tablet/phone   Treatment Plan   Interested in Unit [place]? No   Assessment Assessment completed. Pt preoccupied during assessment with medical concerns. Pt does not want to focus on recreational activities at this time. Pt may use patient computer to pay bills while on unit. Will continue to monitor recreational needs.

## 2019-12-12 NOTE — PROGRESS NOTES
"                     Diabetes Consult Daily  Progress Note          Assessment/Plan:   Miller Workman is a 55 yo man with a history of type 2 diabetes complicated by peripheral neuropathy and retinopathy, cirrhosis secondary to ESTRADA, HCC, HTN, HLD, GERD, BPH, who is s/p DBD liver transplant on 11/11/19, who was readmitted 12/2/19 with acute renal failure, confusion, and elevated tacrolimus level.   Transferred to TCU 12/22     Assessment:   1)Type II Diabetes Mellitus  2)Enteral feed hyperglycemia            Plan:   -glargine (sub for tresiba) 14 units daily AM  1:10g CHO  Novolog medium intensity sliding scale before meals and at bedtime  NPH 20 units at start of cycled tube feeds (1800), increase to 22 units  Increase aspart to 3 units at 0200, to counter end-of-cycle hyperglycemia     Outpatient follow up: with [facility]  Plan discussed with patient and RD                                                   Interval History:   The last 24 hours progress and nursing notes reviewed.  Blood Sai during cycled --> 187--> 198,  Complaining of feeling full and having nausea, no emesis.  Was able to eat a breakfast this am  Discussed with RD, oral intake not at a point where the TF rate can be decreased.  Is working with therapies        Nutrition:  High calorie diet  -supplements with meals   Cycled TF: Nepro at 60 x 12 hours (6-6), providing 116 grams of CHO     PTA Diabetes Regimen:   Accucheck Fidelia Expert for glucose monitoring and calculating aspart doses-(settings input into meter for calculation, he only has to enter his BG and CHO intake). Settings: \"meal rise\"- 50mg/dL. Snack size-10g, active insulin time 3 hrs.     Current regimen:   -Lantus (sub for tresiba) 14 units daily  1:10g CHO  MDSSI AC, HS, 0200  NPH 20 units with cycled tube feeds  Held since transplant:   Metformin ER 500mg with supper  Farxiga 10mg daily       Recent Labs   Lab 12/12/19  1224 12/12/19  0816 12/12/19  0730 " "12/12/19  0549 12/12/19  0158 12/11/19  2232 12/11/19  1735  12/10/19  0517  12/09/19  0620  12/08/19  0522  12/07/19  0555  12/06/19  0552   GLC  --   --  198*  --   --   --   --   --  203*  --  232*  --  205*  --  203*  --  134*   * 181*  --  198* 187* 180* 192*   < >  --    < >  --    < >  --    < >  --    < >  --     < > = values in this interval not displayed.               Review of Systems:   See interval hx          Medications:     Orders Placed This Encounter      High Kcal/High Protein Diet, ADULT       Physical Exam:  Gen: Alert, resting in bed, in NAD   HEENT: NC/AT, mucous membranes are moist  Resp: Unlabored  Ext: No lower extremity edema   Neuro:oriented x3, communicating clearly  /69 (BP Location: Left arm)   Pulse 92   Temp 98.7  F (37.1  C) (Oral)   Resp 18   Ht 1.753 m (5' 9\")   Wt 75.6 kg (166 lb 11.2 oz)   SpO2 96%   BMI 24.62 kg/m             Data:     Lab Results   Component Value Date    A1C 6.6 08/08/2018    A1C 6.5 06/09/2017    A1C 7.8 10/25/2016              CBC RESULTS:   Recent Labs   Lab Test 12/12/19  0730   WBC 4.9   RBC 2.49*   HGB 7.5*   HCT 23.4*   MCV 94   MCH 30.1   MCHC 32.1   RDW 17.3*   PLT 85*     Recent Labs   Lab Test 12/12/19  0730 12/10/19  0517    138   POTASSIUM 5.1 4.2   CHLORIDE 106 107   CO2 26 24   ANIONGAP 6 6   * 203*   BUN 38* 34*   CR 1.49* 1.52*   DEBORAH 8.9 8.6     Liver Function Studies -   Recent Labs   Lab Test 12/12/19  0730   PROTTOTAL 6.5*   ALBUMIN 2.6*   BILITOTAL 0.4   ALKPHOS 165*   AST 20   ALT 32     Lab Results   Component Value Date    INR 1.14 12/05/2019    INR 1.11 11/16/2019    INR 1.12 11/15/2019    INR 1.19 11/14/2019    INR 1.24 11/14/2019    INR 1.27 11/13/2019    INR 1.26 11/13/2019    INR 1.26 11/13/2019    INR 1.28 11/13/2019    INR 1.32 11/12/2019    INR 1.38 11/12/2019    INR 1.36 11/12/2019             Mechelle Kimblal -[phone]  Diabetes Management job code [id number]            "

## 2019-12-13 ENCOUNTER — TELEPHONE (OUTPATIENT)
Dept: TRANSPLANT | Facility: CLINIC | Age: 54
End: 2019-12-13

## 2019-12-13 LAB
GLUCOSE BLDC GLUCOMTR-MCNC: 100 MG/DL (ref 70–99)
GLUCOSE BLDC GLUCOMTR-MCNC: 147 MG/DL (ref 70–99)
GLUCOSE BLDC GLUCOMTR-MCNC: 155 MG/DL (ref 70–99)
GLUCOSE BLDC GLUCOMTR-MCNC: 179 MG/DL (ref 70–99)
GLUCOSE BLDC GLUCOMTR-MCNC: 204 MG/DL (ref 70–99)
GLUCOSE BLDC GLUCOMTR-MCNC: 62 MG/DL (ref 70–99)

## 2019-12-13 PROCEDURE — 97110 THERAPEUTIC EXERCISES: CPT | Mod: GP

## 2019-12-13 PROCEDURE — 00000146 ZZHCL STATISTIC GLUCOSE BY METER IP

## 2019-12-13 PROCEDURE — 97116 GAIT TRAINING THERAPY: CPT | Mod: GP

## 2019-12-13 PROCEDURE — 25000132 ZZH RX MED GY IP 250 OP 250 PS 637: Mod: GY | Performed by: NURSE PRACTITIONER

## 2019-12-13 PROCEDURE — 25000132 ZZH RX MED GY IP 250 OP 250 PS 637: Mod: GY | Performed by: HOSPITALIST

## 2019-12-13 PROCEDURE — 25000131 ZZH RX MED GY IP 250 OP 636 PS 637: Mod: GY | Performed by: HOSPITALIST

## 2019-12-13 PROCEDURE — 97535 SELF CARE MNGMENT TRAINING: CPT | Mod: GO | Performed by: OCCUPATIONAL THERAPIST

## 2019-12-13 PROCEDURE — 12000022 ZZH R&B SNF

## 2019-12-13 PROCEDURE — 25000131 ZZH RX MED GY IP 250 OP 636 PS 637: Mod: GY | Performed by: PHYSICIAN ASSISTANT

## 2019-12-13 RX ADMIN — TAMSULOSIN HYDROCHLORIDE 0.4 MG: 0.4 CAPSULE ORAL at 21:31

## 2019-12-13 RX ADMIN — MELATONIN 2000 UNITS: at 18:12

## 2019-12-13 RX ADMIN — TACROLIMUS 4.5 MG: 1 CAPSULE ORAL at 08:57

## 2019-12-13 RX ADMIN — MAGNESIUM OXIDE TAB 400 MG (241.3 MG ELEMENTAL MG) 400 MG: 400 (241.3 MG) TAB at 08:58

## 2019-12-13 RX ADMIN — FERROUS SULFATE TAB 325 MG (65 MG ELEMENTAL FE) 325 MG: 325 (65 FE) TAB at 09:00

## 2019-12-13 RX ADMIN — FERROUS SULFATE TAB 325 MG (65 MG ELEMENTAL FE) 325 MG: 325 (65 FE) TAB at 13:08

## 2019-12-13 RX ADMIN — ROSUVASTATIN CALCIUM 5 MG: 5 TABLET, FILM COATED ORAL at 21:31

## 2019-12-13 RX ADMIN — Medication 1 PACKET: at 21:33

## 2019-12-13 RX ADMIN — ASPIRIN 325 MG: 325 TABLET, DELAYED RELEASE ORAL at 09:01

## 2019-12-13 RX ADMIN — CARVEDILOL 12.5 MG: 3.12 TABLET, FILM COATED ORAL at 18:14

## 2019-12-13 RX ADMIN — THERA TABS 1 TABLET: TAB at 18:12

## 2019-12-13 RX ADMIN — INSULIN ASPART 1 UNITS: 100 INJECTION, SOLUTION INTRAVENOUS; SUBCUTANEOUS at 13:09

## 2019-12-13 RX ADMIN — TACROLIMUS 4.5 MG: 1 CAPSULE ORAL at 21:30

## 2019-12-13 RX ADMIN — AZATHIOPRINE 100 MG: 50 TABLET ORAL at 08:58

## 2019-12-13 RX ADMIN — SODIUM BICARBONATE 650 MG TABLET 650 MG: at 09:00

## 2019-12-13 RX ADMIN — SODIUM BICARBONATE 650 MG TABLET 650 MG: at 21:31

## 2019-12-13 RX ADMIN — DIBASIC SODIUM PHOSPHATE, MONOBASIC POTASSIUM PHOSPHATE AND MONOBASIC SODIUM PHOSPHATE 250 MG: 852; 155; 130 TABLET ORAL at 09:00

## 2019-12-13 RX ADMIN — SULFAMETHOXAZOLE AND TRIMETHOPRIM 1 TABLET: 400; 80 TABLET ORAL at 09:00

## 2019-12-13 RX ADMIN — VALGANCICLOVIR 450 MG: 450 TABLET, FILM COATED ORAL at 09:00

## 2019-12-13 RX ADMIN — TENOFOVIR DISOPROXIL FUMARATE 300 MG: 300 TABLET, COATED ORAL at 08:58

## 2019-12-13 RX ADMIN — DIBASIC SODIUM PHOSPHATE, MONOBASIC POTASSIUM PHOSPHATE AND MONOBASIC SODIUM PHOSPHATE 250 MG: 852; 155; 130 TABLET ORAL at 21:31

## 2019-12-13 RX ADMIN — PRENATAL VITAMINS-IRON FUMARATE 27 MG IRON-FOLIC ACID 0.8 MG TABLET 1 TABLET: at 09:00

## 2019-12-13 RX ADMIN — CYANOCOBALAMIN TAB 1000 MCG 1000 MCG: 1000 TAB at 18:12

## 2019-12-13 RX ADMIN — MIRTAZAPINE 15 MG: 7.5 TABLET, FILM COATED ORAL at 21:30

## 2019-12-13 RX ADMIN — INSULIN GLARGINE 14 UNITS: 100 INJECTION, SOLUTION SUBCUTANEOUS at 09:06

## 2019-12-13 RX ADMIN — INSULIN ASPART 2 UNITS: 100 INJECTION, SOLUTION INTRAVENOUS; SUBCUTANEOUS at 09:03

## 2019-12-13 RX ADMIN — OMEPRAZOLE 40 MG: 20 CAPSULE, DELAYED RELEASE ORAL at 09:01

## 2019-12-13 RX ADMIN — MICONAZOLE NITRATE: 20 CREAM TOPICAL at 21:36

## 2019-12-13 RX ADMIN — CARVEDILOL 12.5 MG: 3.12 TABLET, FILM COATED ORAL at 08:58

## 2019-12-13 RX ADMIN — FERROUS SULFATE TAB 325 MG (65 MG ELEMENTAL FE) 325 MG: 325 (65 FE) TAB at 18:12

## 2019-12-13 RX ADMIN — ACETAMINOPHEN 650 MG: 325 TABLET, FILM COATED ORAL at 02:25

## 2019-12-13 RX ADMIN — MAGNESIUM OXIDE TAB 400 MG (241.3 MG ELEMENTAL MG) 400 MG: 400 (241.3 MG) TAB at 21:31

## 2019-12-13 ASSESSMENT — MIFFLIN-ST. JEOR: SCORE: 1581.53

## 2019-12-13 NOTE — PLAN OF CARE
A &Ox4, able to communicate needs. Denies SOB, chest pain, nausea, or any discomfort at this time. Mod I in room. NG clamped, WDL. High flynn/high protein diet, good appetite today.   and 147, sliding scale and carb coverage given per orders. Calorie counts continued. Continent of bowel and bladder, LBM 12/12. Dry, nonproductive cough noted. Pleasant and cooperative w/ cares. Will continue with plan of care.

## 2019-12-13 NOTE — PLAN OF CARE
Patient alert and oriented x 4. Complained of headache @ beginning of the shift, requested tylenol but when this RN came back in his room he was sleeping. Tylenol given @ 0225, BG-155, scheduled insulin given as ordered. Patient voided 900 ml @ 0330. TF running @ 40 ml/hr, rate requested by patient to run @ 40 ml/hr. Mod I in the room. No respiratory distress noted. Will continue with current plan of care.

## 2019-12-13 NOTE — PHARMACY-MEDICATION REGIMEN REVIEW
Pharmacy Medication Regimen Review  Frandy Workman is a 54 year old male who is currently in the Transitional Care Unit.    Assessment: All medications have an appropriate indications, durations and no unnecessary use was found    Plan:   Continue medications as ordered.  Monitor renal function & dose renally excreted medications appropriately.  Attending provider will be sent this note for review.  If there are any emergent issues noted above, pharmacist will contact provider directly by phone.      Pharmacy will periodically review the resident's medication regimen for any PRN medications not administered in > 72 hours and discontinue them. The pharmacist will discuss gradual dose reductions of psychopharmacologic medications with interdisciplinary team on a regular basis.    Please contact pharmacy if the above does not answer specific medication questions/concerns.    Background:  A pharmacist has reviewed all medications and pertinent medical history today.  Medications were reviewed for appropriate use and any irregularities found are listed with recommendations.      Current Facility-Administered Medications:      - Skin Test Reading -, , Does not apply, Q21 Days, Melissa Larry APRN CNP     acetaminophen (TYLENOL) Suppository 650 mg, 650 mg, Rectal, Q4H PRN, Melissa Larry APRN CNP     acetaminophen (TYLENOL) tablet 650 mg, 650 mg, Oral, Q4H PRN, Melissa Larry APRN CNP, 650 mg at 12/13/19 0225     alpha-lipoic acid capsule 600 mg, 600 mg, Oral, Daily, Yasmine Menon MD, 600 mg at 12/12/19 1728     aspirin (ASA) EC tablet 325 mg, 325 mg, Oral, Daily, Yasmine Menon MD, 325 mg at 12/13/19 0901     azaTHIOprine (IMURAN) tablet 100 mg, 100 mg, Oral, Daily, Yasmine Menon MD, 100 mg at 12/13/19 0858     carvedilol (COREG) tablet 12.5 mg, 12.5 mg, Oral, BID w/meals, Melissa Larry APRN CNP, 12.5 mg at 12/13/19 0858     cyanocobalamin (VITAMIN B-12)  tablet 1,000 mcg, 1,000 mcg, Oral, Daily, Melissa Larry APRN CNP, 1,000 mcg at 12/12/19 1729     dextrose 10 % 1,000 mL infusion, , Intravenous, Continuous PRN, Melissa Larry APRN CNP     glucose gel 15-30 g, 15-30 g, Oral, Q15 Min PRN **OR** dextrose 50 % injection 25-50 mL, 25-50 mL, Intravenous, Q15 Min PRN **OR** glucagon injection 1 mg, 1 mg, Subcutaneous, Q15 Min PRN, Melissa Larry APRN CNP     ferrous sulfate (FEROSUL) tablet 325 mg, 325 mg, Oral, TID w/meals, Melissa Larry APRN CNP, 325 mg at 12/13/19 0900     fiber modular (NUTRISOURCE FIBER) packet 1 packet, 1 packet, Per Feeding Tube, BID, Melissa Larry APRN CNP, 1 packet at 12/12/19 0840     insulin aspart (NovoLOG) inj (RAPID ACTING), 1-7 Units, Subcutaneous, TID AC, Melissa Larry APRN CNP, 2 Units at 12/13/19 0903     insulin aspart (NovoLOG) inj (RAPID ACTING), , Subcutaneous, TID AC, Melissa Larry APRN CNP, 6 Units at 12/13/19 0902     insulin aspart (NovoLOG) inj (RAPID ACTING), , Subcutaneous, With Snacks or Supplements, Melissa Larry APRN CNP, 2 Units at 12/11/19 2053     insulin aspart (NovoLOG) inj (RAPID ACTING), 1-5 Units, Subcutaneous, At Bedtime, Melissa Larry APRN CNP     insulin aspart (NovoLOG) inj (RAPID ACTING), 3 Units, Subcutaneous, Daily, Melissa Larry APRN CNP, 3 Units at 12/13/19 0225     insulin glargine (LANTUS PEN) injection 14 Units, 14 Units, Subcutaneous, QAM, Melissa Larry APRN CNP, 14 Units at 12/13/19 0906     insulin isophane human (HumuLIN N PEN) injection 22 Units, 22 Units, Subcutaneous, QPM, Mechelle Kimball APRN CNP, 22 Units at 12/12/19 1821     magnesium oxide (MAG-OX) tablet 400 mg, 400 mg, Oral, BID, Melissa Larry APRN CNP, 400 mg at 12/13/19 0858     miconazole (MICATIN) 2 % cream, , Topical, BID, Melissa Larry APRN CNP      mirtazapine (REMERON) tablet TABS 15 mg, 15 mg, Oral, At Bedtime, Melissa Larry APRN CNP, 15 mg at 12/12/19 2131     multivitamin, therapeutic (THERA-VIT) tablet 1 tablet, 1 tablet, Oral, Daily, Melissa Larry APRN CNP, 1 tablet at 12/12/19 1729     Nurse may request from Pharmacy a change of form of medication (e.g. Liquid to tablet)., , Does not apply, Continuous PRN, Melissa Larry APRN CNP     omeprazole (priLOSEC) CR capsule 40 mg, 40 mg, Oral, QAM AC, Melissa Larry APRN CNP, 40 mg at 12/13/19 0901     ondansetron (ZOFRAN-ODT) ODT tab 4 mg, 4 mg, Oral, Q6H PRN, 4 mg at 12/12/19 0828 **OR** ondansetron (ZOFRAN) injection 4 mg, 4 mg, Intravenous, Q6H PRN, Melissa Larry APRN CNP     phosphorus tablet 250 mg (PHOSPHA 250 NEUTRAL) per tablet 250 mg, 250 mg, Oral, BID, Melissa Larry APRN CNP, 250 mg at 12/13/19 0900     prenatal multivitamin w/iron per tablet 1 tablet, 1 tablet, Oral, Daily, Melissa Larry APRN CNP, 1 tablet at 12/13/19 0900     prochlorperazine (COMPAZINE) tablet 5 mg, 5 mg, Oral, Q6H PRN, Melissa Larry APRN CNP     rosuvastatin (CRESTOR) tablet 5 mg, 5 mg, Oral, At Bedtime, Melissa Larry APRN CNP, 5 mg at 12/12/19 2131     simethicone (MYLICON) chewable tablet 80 mg, 80 mg, Oral, Q6H PRN, Melissa Larry APRN CNP, 80 mg at 12/11/19 2032     sodium bicarbonate tablet 650 mg, 650 mg, Oral, BID, Yasmine Menon MD, 650 mg at 12/13/19 0900     sodium chloride (OCEAN) 0.65 % nasal spray 1 spray, 1 spray, Both Nostrils, Q1H PRN, Melsisa Larry APRN CNP     sulfamethoxazole-trimethoprim (BACTRIM/SEPTRA) 400-80 MG per tablet 1 tablet, 1 tablet, Oral, Daily, Melissa Larry APRN CNP, 1 tablet at 12/13/19 0900     tacrolimus (GENERIC EQUIVALENT) capsule 4.5 mg, 4.5 mg, Oral, BID, Maryanne Recio PA-C, 4.5 mg at 12/13/19 0857     tamsulosin  (FLOMAX) capsule 0.4 mg, 0.4 mg, Oral, QPM, Melissa Larry APRN CNP, 0.4 mg at 12/12/19 2132     tenofovir (VIREAD) tablet 300 mg, 300 mg, Oral, Daily, Melissa Larry APRN CNP, 300 mg at 12/13/19 0858     tuberculin injection 5 Units, 5 Units, Intradermal, Q21 Days, Melissa Larry APRN CNP, 5 Units at 12/11/19 1433     valGANciclovir (VALCYTE) tablet 450 mg, 450 mg, Oral, Daily, Melissa Larry APRN CNP, 450 mg at 12/13/19 0900     Vitamin D3 (CHOLECALCIFEROL) 25 mcg (1000 units) tablet 2,000 Units, 2,000 Units, Oral, Daily, Melissa Larry APRN CNP, 2,000 Units at 12/12/19 1729  No current outpatient prescriptions on file.   PMH: S/p liver transplant 11/11/19
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
## 2019-12-13 NOTE — PLAN OF CARE
"Alert and oriented, VSS. Independent in room. No respiratory distress. BG readings were 124, 175. Appetite fair, meal receipt saved for calorie count. TF started via NG tube at 1815. Around 2000 pt reported feeling warm with a headache, temperature 99.1. Later rechecked, temperature reduced but still feeling generalized aches. PRN tylenol given. About an hour after tylenol, pt reported feeling abdominal fullness; declined offer of PRN zofran, writer decreased TF rate to 40 mL/hr temporarily. Charge RN and next RN notified, continue to monitor. Continent of B/B; LBM 12/12. Writer spent about an hour with pt talking about recent changes to medications based on notes. He reported frustration/anxiety, and continued to say, \"Nobody is telling me anything,\" \"None of this makes sense to me.\" Note left for provider.     "

## 2019-12-13 NOTE — PROGRESS NOTES
Calorie Counts    12/12: 1289 kcal and 80 g protein (3 meals, 2 supplements)  12/13: 363 kcal and 33 g protein (Breakfast only, did not drink supplement)    Ailyn Hickman  Dietetic Intern

## 2019-12-13 NOTE — PROGRESS NOTES
"                     Diabetes Consult Daily  Progress Note          Assessment/Plan:                          Miller Workman is a 55 yo man with a history of type 2 diabetes complicated by peripheral neuropathy and retinopathy, cirrhosis secondary to ESTRADA, HCC, HTN, HLD, GERD, BPH, who is s/p DBD liver transplant on 11/11/19, who was readmitted 12/2/19 with acute renal failure, confusion, and elevated tacrolimus level.   Transferred to TCU 12/22     Assessment:   1)Type II Diabetes Mellitus  2)Enteral feed hyperglycemia            Plan:   -glargine (sub for tresiba) 14 units daily AM  1:10g CHO  Novolog medium intensity sliding scale before meals and at bedtime  NPH 22 units at start of cycled tube feeds (1800), ( discontinue if TF are stopped)  - aspart to 3 units at 0200, to counter end-of-cycle hyperglycemia ( discontinue if TF are stopped or discontinued)     Outpatient follow up: with [facility]  Plan discussed with primary team                                             Inpatient Diabetes Team will sign-off, discussed with Rossi Contreras CNP, please contact via job code pager [phone] for questions.           Interval History:   The last 24 hours progress and nursing notes reviewed.    Increase NPH at the start of cycled TF, patient requested the TF to run at 40 ml versus 60 ml.  Blood sugars were elevated 175--> 155--> 204 ( despite the increase in insulin and the decrease in TF rate).  Do not feel comfortable making adjustments to diabetes regime with Mr. Workman requesting decrease in TF rate.  Oral intake improving  Is working with therapies            Nutrition:  High calorie diet  -supplements with meals   Cycled TF: Nepro at 60 x 12 hours (6-6), providing 116 grams of CHO     PTA Diabetes Regimen:   NextUser Expert for glucose monitoring and calculating aspart doses-(settings input into meter for calculation, he only has to enter his BG and CHO intake). Settings: \"meal rise\"- 50mg/dL. Snack " "size-10g, active insulin time 3 hrs.     Current regimen:   -Lantus (sub for tresiba) 14 units daily  1:10g CHO  MDSSI AC, HS, 0200  NPH 20 units with cycled tube feeds  Held since transplant:   Metformin ER 500mg with supper  Farxiga 10mg daily    Recent Labs   Lab 12/13/19  1237 12/13/19  0736 12/13/19  0153 12/12/19  2132 12/12/19  1651 12/12/19  1433  12/12/19  0730  12/10/19  0517  12/09/19  0620  12/08/19  0522  12/07/19  0555   GLC  --   --   --   --   --   --   --  198*  --  203*  --  232*  --  205*  --  203*   * 204* 155* 175* 124* 149*   < >  --    < >  --    < >  --    < >  --    < >  --     < > = values in this interval not displayed.               Review of Systems:   See interval hx          Medications:     Orders Placed This Encounter      High Kcal/High Protein Diet, ADULT       Physical Exam:  Gen: NAD   HEENT:  mucous membranes are moist  Resp: non-labored  Neuro: sleeping  /71 (BP Location: Left arm)   Pulse 88   Temp 97.6  F (36.4  C) (Oral)   Resp 18   Ht 1.753 m (5' 9\")   Wt 75.6 kg (166 lb 11.2 oz)   SpO2 96%   BMI 24.62 kg/m             Data:     Lab Results   Component Value Date    A1C 6.6 08/08/2018    A1C 6.5 06/09/2017    A1C 7.8 10/25/2016              CBC RESULTS:   Recent Labs   Lab Test 12/12/19 0730   WBC 4.9   RBC 2.49*   HGB 7.5*   HCT 23.4*   MCV 94   MCH 30.1   MCHC 32.1   RDW 17.3*   PLT 85*     Recent Labs   Lab Test 12/12/19  0730 12/10/19  0517    138   POTASSIUM 5.1 4.2   CHLORIDE 106 107   CO2 26 24   ANIONGAP 6 6   * 203*   BUN 38* 34*   CR 1.49* 1.52*   DEBORAH 8.9 8.6     Liver Function Studies -   Recent Labs   Lab Test 12/12/19  0730   PROTTOTAL 6.5*   ALBUMIN 2.6*   BILITOTAL 0.4   ALKPHOS 165*   AST 20   ALT 32     Lab Results   Component Value Date    INR 1.14 12/05/2019    INR 1.11 11/16/2019    INR 1.12 11/15/2019    INR 1.19 11/14/2019    INR 1.24 11/14/2019    INR 1.27 11/13/2019    INR 1.26 11/13/2019    INR 1.26 11/13/2019    " INR 1.28 11/13/2019    INR 1.32 11/12/2019    INR 1.38 11/12/2019    INR 1.36 11/12/2019             Mechelle Kimball [phone]  Diabetes Management job code [id number]

## 2019-12-14 LAB
GLUCOSE BLDC GLUCOMTR-MCNC: 146 MG/DL (ref 70–99)
GLUCOSE BLDC GLUCOMTR-MCNC: 196 MG/DL (ref 70–99)
GLUCOSE BLDC GLUCOMTR-MCNC: 201 MG/DL (ref 70–99)
GLUCOSE BLDC GLUCOMTR-MCNC: 201 MG/DL (ref 70–99)
GLUCOSE BLDC GLUCOMTR-MCNC: 230 MG/DL (ref 70–99)

## 2019-12-14 PROCEDURE — 25000131 ZZH RX MED GY IP 250 OP 636 PS 637: Mod: GY | Performed by: HOSPITALIST

## 2019-12-14 PROCEDURE — 25000131 ZZH RX MED GY IP 250 OP 636 PS 637: Mod: GY | Performed by: PHYSICIAN ASSISTANT

## 2019-12-14 PROCEDURE — 00000146 ZZHCL STATISTIC GLUCOSE BY METER IP

## 2019-12-14 PROCEDURE — 25000132 ZZH RX MED GY IP 250 OP 250 PS 637: Mod: GY | Performed by: HOSPITALIST

## 2019-12-14 PROCEDURE — 25800025 ZZH RX 258: Performed by: NURSE PRACTITIONER

## 2019-12-14 PROCEDURE — 25000132 ZZH RX MED GY IP 250 OP 250 PS 637: Mod: GY | Performed by: NURSE PRACTITIONER

## 2019-12-14 PROCEDURE — 12000022 ZZH R&B SNF

## 2019-12-14 PROCEDURE — 97110 THERAPEUTIC EXERCISES: CPT | Mod: GP | Performed by: PHYSICAL THERAPIST

## 2019-12-14 RX ADMIN — ASPIRIN 325 MG: 325 TABLET, DELAYED RELEASE ORAL at 08:31

## 2019-12-14 RX ADMIN — TAMSULOSIN HYDROCHLORIDE 0.4 MG: 0.4 CAPSULE ORAL at 20:40

## 2019-12-14 RX ADMIN — MAGNESIUM OXIDE TAB 400 MG (241.3 MG ELEMENTAL MG) 400 MG: 400 (241.3 MG) TAB at 20:40

## 2019-12-14 RX ADMIN — MIRTAZAPINE 15 MG: 7.5 TABLET, FILM COATED ORAL at 21:02

## 2019-12-14 RX ADMIN — MICONAZOLE NITRATE: 20 CREAM TOPICAL at 21:02

## 2019-12-14 RX ADMIN — AZATHIOPRINE 100 MG: 50 TABLET ORAL at 08:31

## 2019-12-14 RX ADMIN — Medication 500 MG: at 21:03

## 2019-12-14 RX ADMIN — SODIUM BICARBONATE 650 MG TABLET 650 MG: at 20:40

## 2019-12-14 RX ADMIN — INSULIN GLARGINE 14 UNITS: 100 INJECTION, SOLUTION SUBCUTANEOUS at 08:31

## 2019-12-14 RX ADMIN — SODIUM BICARBONATE 650 MG TABLET 650 MG: at 08:31

## 2019-12-14 RX ADMIN — FERROUS SULFATE TAB 325 MG (65 MG ELEMENTAL FE) 325 MG: 325 (65 FE) TAB at 13:04

## 2019-12-14 RX ADMIN — Medication 1 PACKET: at 08:31

## 2019-12-14 RX ADMIN — DIBASIC SODIUM PHOSPHATE, MONOBASIC POTASSIUM PHOSPHATE AND MONOBASIC SODIUM PHOSPHATE 250 MG: 852; 155; 130 TABLET ORAL at 20:40

## 2019-12-14 RX ADMIN — SULFAMETHOXAZOLE AND TRIMETHOPRIM 1 TABLET: 400; 80 TABLET ORAL at 08:30

## 2019-12-14 RX ADMIN — ROSUVASTATIN CALCIUM 5 MG: 5 TABLET, FILM COATED ORAL at 21:03

## 2019-12-14 RX ADMIN — INSULIN ASPART 2 UNITS: 100 INJECTION, SOLUTION INTRAVENOUS; SUBCUTANEOUS at 08:32

## 2019-12-14 RX ADMIN — TACROLIMUS 4.5 MG: 1 CAPSULE ORAL at 20:40

## 2019-12-14 RX ADMIN — DIBASIC SODIUM PHOSPHATE, MONOBASIC POTASSIUM PHOSPHATE AND MONOBASIC SODIUM PHOSPHATE 250 MG: 852; 155; 130 TABLET ORAL at 08:31

## 2019-12-14 RX ADMIN — FERROUS SULFATE TAB 325 MG (65 MG ELEMENTAL FE) 325 MG: 325 (65 FE) TAB at 08:31

## 2019-12-14 RX ADMIN — OMEPRAZOLE 40 MG: 20 CAPSULE, DELAYED RELEASE ORAL at 08:30

## 2019-12-14 RX ADMIN — TACROLIMUS 4.5 MG: 1 CAPSULE ORAL at 08:30

## 2019-12-14 RX ADMIN — MICONAZOLE NITRATE: 20 CREAM TOPICAL at 08:31

## 2019-12-14 RX ADMIN — INSULIN ASPART 1 UNITS: 100 INJECTION, SOLUTION INTRAVENOUS; SUBCUTANEOUS at 17:43

## 2019-12-14 RX ADMIN — THERA TABS 1 TABLET: TAB at 17:42

## 2019-12-14 RX ADMIN — CARVEDILOL 12.5 MG: 3.12 TABLET, FILM COATED ORAL at 08:30

## 2019-12-14 RX ADMIN — MAGNESIUM OXIDE TAB 400 MG (241.3 MG ELEMENTAL MG) 400 MG: 400 (241.3 MG) TAB at 08:31

## 2019-12-14 RX ADMIN — VALGANCICLOVIR 450 MG: 450 TABLET, FILM COATED ORAL at 08:31

## 2019-12-14 RX ADMIN — CYANOCOBALAMIN TAB 1000 MCG 1000 MCG: 1000 TAB at 17:42

## 2019-12-14 RX ADMIN — DEXTROSE MONOHYDRATE: 100 INJECTION, SOLUTION INTRAVENOUS at 19:50

## 2019-12-14 RX ADMIN — CARVEDILOL 12.5 MG: 3.12 TABLET, FILM COATED ORAL at 17:42

## 2019-12-14 RX ADMIN — MELATONIN 2000 UNITS: at 17:42

## 2019-12-14 RX ADMIN — INSULIN ASPART 2 UNITS: 100 INJECTION, SOLUTION INTRAVENOUS; SUBCUTANEOUS at 13:05

## 2019-12-14 RX ADMIN — PRENATAL VITAMINS-IRON FUMARATE 27 MG IRON-FOLIC ACID 0.8 MG TABLET 1 TABLET: at 08:30

## 2019-12-14 RX ADMIN — FERROUS SULFATE TAB 325 MG (65 MG ELEMENTAL FE) 325 MG: 325 (65 FE) TAB at 17:42

## 2019-12-14 RX ADMIN — TENOFOVIR DISOPROXIL FUMARATE 300 MG: 300 TABLET, COATED ORAL at 08:30

## 2019-12-14 ASSESSMENT — MIFFLIN-ST. JEOR: SCORE: 1585.16

## 2019-12-14 NOTE — PLAN OF CARE
Patient tube feed would not flush. Attempted to unclog the tube with coke, 15ml of coke administered although still would not flush. Encouraged PO intake.

## 2019-12-14 NOTE — PLAN OF CARE
Pt A&ox4, able to make needs known. Denies pain, SOB and nausea this shift. Mod I in room. NG clamped, WDL.  and 196. Calorie counts. Good appetite. Continent of bowel and bladder. Call light within reach, will continue to monitor.

## 2019-12-14 NOTE — PLAN OF CARE
RN: Pt AA&O x3, able to make needs known. NG bridled; pt denies pain in nares. MOD I in room; walked w/ pt in hallway. TF started at 1830 as pt was eating dinner. BG 62, then 100 after pt had juice and dinner; flynn ct in folder.  at HS. Pt expressed concern about not having any support once he leaves TCU. Con't POC.

## 2019-12-14 NOTE — PROGRESS NOTES
Calorie Count Assessment:  12/13: 1263 kcal and 65 g protein (3 meals recorded, no supplements recorded)    Plan: Continue cycled TF/diet/supplements and calorie count as ordered. Re-assess calorie counts and TF plan of care on Monday(12/16).     Traci Kim RD, LD  Weekend pager: [phone]  TCU RD week day pager: [phone]

## 2019-12-14 NOTE — PLAN OF CARE
RN: Pt appears to be sleeping most of shift; waking briefly for cares. . Tube feeding running in NJ at 60 mL/h w/ no s/s of complications. Pt MOD I in room w/ cane. TF turned off round 0630; water flushed via Kangaroo and would not flush via syringe; day RN updated and will follow up w/ new syringe and troubleshoot. Con't POC.

## 2019-12-15 LAB
GLUCOSE BLDC GLUCOMTR-MCNC: 139 MG/DL (ref 70–99)
GLUCOSE BLDC GLUCOMTR-MCNC: 163 MG/DL (ref 70–99)
GLUCOSE BLDC GLUCOMTR-MCNC: 202 MG/DL (ref 70–99)
GLUCOSE BLDC GLUCOMTR-MCNC: 256 MG/DL (ref 70–99)
GLUCOSE BLDC GLUCOMTR-MCNC: 261 MG/DL (ref 70–99)
GLUCOSE BLDC GLUCOMTR-MCNC: 307 MG/DL (ref 70–99)

## 2019-12-15 PROCEDURE — 12000022 ZZH R&B SNF

## 2019-12-15 PROCEDURE — 99309 SBSQ NF CARE MODERATE MDM 30: CPT | Performed by: NURSE PRACTITIONER

## 2019-12-15 PROCEDURE — 25000131 ZZH RX MED GY IP 250 OP 636 PS 637: Mod: GY | Performed by: HOSPITALIST

## 2019-12-15 PROCEDURE — 25000131 ZZH RX MED GY IP 250 OP 636 PS 637: Mod: GY | Performed by: PHYSICIAN ASSISTANT

## 2019-12-15 PROCEDURE — 25000132 ZZH RX MED GY IP 250 OP 250 PS 637: Mod: GY | Performed by: NURSE PRACTITIONER

## 2019-12-15 PROCEDURE — 00000146 ZZHCL STATISTIC GLUCOSE BY METER IP

## 2019-12-15 PROCEDURE — 25000132 ZZH RX MED GY IP 250 OP 250 PS 637: Mod: GY | Performed by: HOSPITALIST

## 2019-12-15 RX ADMIN — INSULIN ASPART 1 UNITS: 100 INJECTION, SOLUTION INTRAVENOUS; SUBCUTANEOUS at 17:28

## 2019-12-15 RX ADMIN — SULFAMETHOXAZOLE AND TRIMETHOPRIM 1 TABLET: 400; 80 TABLET ORAL at 08:13

## 2019-12-15 RX ADMIN — INSULIN ASPART 3 UNITS: 100 INJECTION, SOLUTION INTRAVENOUS; SUBCUTANEOUS at 12:39

## 2019-12-15 RX ADMIN — TENOFOVIR DISOPROXIL FUMARATE 300 MG: 300 TABLET, COATED ORAL at 08:13

## 2019-12-15 RX ADMIN — TACROLIMUS 4.5 MG: 1 CAPSULE ORAL at 21:02

## 2019-12-15 RX ADMIN — MAGNESIUM OXIDE TAB 400 MG (241.3 MG ELEMENTAL MG) 400 MG: 400 (241.3 MG) TAB at 21:04

## 2019-12-15 RX ADMIN — Medication 600 MG: at 20:58

## 2019-12-15 RX ADMIN — ROSUVASTATIN CALCIUM 5 MG: 5 TABLET, FILM COATED ORAL at 21:04

## 2019-12-15 RX ADMIN — TAMSULOSIN HYDROCHLORIDE 0.4 MG: 0.4 CAPSULE ORAL at 21:05

## 2019-12-15 RX ADMIN — FERROUS SULFATE TAB 325 MG (65 MG ELEMENTAL FE) 325 MG: 325 (65 FE) TAB at 17:06

## 2019-12-15 RX ADMIN — DIBASIC SODIUM PHOSPHATE, MONOBASIC POTASSIUM PHOSPHATE AND MONOBASIC SODIUM PHOSPHATE 250 MG: 852; 155; 130 TABLET ORAL at 08:13

## 2019-12-15 RX ADMIN — MIRTAZAPINE 15 MG: 7.5 TABLET, FILM COATED ORAL at 21:04

## 2019-12-15 RX ADMIN — TACROLIMUS 4.5 MG: 1 CAPSULE ORAL at 08:13

## 2019-12-15 RX ADMIN — SODIUM BICARBONATE 650 MG TABLET 650 MG: at 08:13

## 2019-12-15 RX ADMIN — DIBASIC SODIUM PHOSPHATE, MONOBASIC POTASSIUM PHOSPHATE AND MONOBASIC SODIUM PHOSPHATE 250 MG: 852; 155; 130 TABLET ORAL at 21:04

## 2019-12-15 RX ADMIN — CARVEDILOL 12.5 MG: 3.12 TABLET, FILM COATED ORAL at 17:05

## 2019-12-15 RX ADMIN — INSULIN GLARGINE 14 UNITS: 100 INJECTION, SOLUTION SUBCUTANEOUS at 08:43

## 2019-12-15 RX ADMIN — MAGNESIUM OXIDE TAB 400 MG (241.3 MG ELEMENTAL MG) 400 MG: 400 (241.3 MG) TAB at 08:13

## 2019-12-15 RX ADMIN — THERA TABS 1 TABLET: TAB at 17:06

## 2019-12-15 RX ADMIN — AZATHIOPRINE 100 MG: 50 TABLET ORAL at 08:13

## 2019-12-15 RX ADMIN — VALGANCICLOVIR 450 MG: 450 TABLET, FILM COATED ORAL at 08:14

## 2019-12-15 RX ADMIN — ASPIRIN 325 MG: 325 TABLET, DELAYED RELEASE ORAL at 08:13

## 2019-12-15 RX ADMIN — PRENATAL VITAMINS-IRON FUMARATE 27 MG IRON-FOLIC ACID 0.8 MG TABLET 1 TABLET: at 08:13

## 2019-12-15 RX ADMIN — MICONAZOLE NITRATE: 20 CREAM TOPICAL at 08:14

## 2019-12-15 RX ADMIN — MELATONIN 2000 UNITS: at 17:05

## 2019-12-15 RX ADMIN — SODIUM BICARBONATE 650 MG TABLET 650 MG: at 21:04

## 2019-12-15 RX ADMIN — MICONAZOLE NITRATE: 20 CREAM TOPICAL at 22:15

## 2019-12-15 RX ADMIN — CYANOCOBALAMIN TAB 1000 MCG 1000 MCG: 1000 TAB at 17:06

## 2019-12-15 RX ADMIN — FERROUS SULFATE TAB 325 MG (65 MG ELEMENTAL FE) 325 MG: 325 (65 FE) TAB at 12:39

## 2019-12-15 RX ADMIN — FERROUS SULFATE TAB 325 MG (65 MG ELEMENTAL FE) 325 MG: 325 (65 FE) TAB at 08:13

## 2019-12-15 RX ADMIN — OMEPRAZOLE 40 MG: 20 CAPSULE, DELAYED RELEASE ORAL at 08:13

## 2019-12-15 RX ADMIN — INSULIN ASPART 4 UNITS: 100 INJECTION, SOLUTION INTRAVENOUS; SUBCUTANEOUS at 08:14

## 2019-12-15 RX ADMIN — Medication 1 PACKET: at 22:15

## 2019-12-15 RX ADMIN — CARVEDILOL 12.5 MG: 3.12 TABLET, FILM COATED ORAL at 08:13

## 2019-12-15 ASSESSMENT — MIFFLIN-ST. JEOR: SCORE: 1578.35

## 2019-12-15 NOTE — PROGRESS NOTES
[facility]  Internal Medicine Progress Note    TCU Day # 5    Assessment & Plan: Frandy Workman is a 54 year old male with a history of liver cirrhosis 2/2 ESTRADA, HCC (dx 2018), now s/p liver transplant on 11/11/19, HTN, HLD, type 2 DM, GERD, and BPH admitted to [facility] 12/2-12/10 for JERONIMO, nausea, diarrhea, confusion, and weakness.      # Weakness, deconditioning, confusion, FTT - Admitted from clinic on 12/2 after labs significant for JERONIMO and reports of nausea, vomiting, weakness, and poor appetite x 1 week.  His primary caregiver left town after he was discharged from the hospital following his transplant surgery, and now only has PCA care about 3hr/week.  Overall is improving w/ therapies and motivated.  Eager to stop TFs.      - PT, OT consults      # S/p orthotopic liver transplant (11/11/19) 2/2 ESLD r/t ESTRADA, HCC  LFTs wnl, though w/ slight rise in AP to 162.  Donor HBcAb+, on tenofovir 100mg indefinitely.  Underwent upper endoscopy and colonoscopy on 12/6 d/t persistent nausea and diarrhea and found to have mild gastropathy; duodenal, stomach, and colon bx + mycophenolate toxicity.  Negative for CMV and H.pylori.  Mycophenolate stopped as of evening 12/10 and started on Imuran 100mg daily per Transplant.  Last tacro level 8.2 on 12/12.             - IS:  Tacrolimus 4.5mg BID (goal 10-12; last adjusted on 12/12) and Imuran 100mg daily   - Ppx:  Continue Bactrim, Tenofovir, and Valcyte   - Continue to check Tacro levels M, Thur   - Continue PPI daily    # Type 2 DM   # Hyperglycemia 2/2 dextrose infusion   Last Hgb A1c 5.1% on 12/2.  Dx at age 29 and has required insulin since 2001.  Additionally, prior to transplant had been maintained on Tresiba 55 units, Metformin ER 500mg w/ supper, and Farxiga 10mg daily.  PTA regimen on hold, currently on Lantus 14 units daily, carb coverage 1u: 10g CHO, MSSI, and NPH in setting of resolving JERONIMO and malnutrition requiring TFs.  Overnight 12/14 TFs  held d/t clogged NJ; NPH held, however RN still gave PRN dextrose infusion.     - Continue Lantus 14 units daily for now   - Continue MSSI   - Continue carb coverage 1u: 10g CHO  - Continue NPH 20 units to be given w/ start of TF; please do not give if TF not started   - Insulin aspart 3 units at 0200 for AM hyperglycemia   - BG checks ac/hs and 0200   - Hypoglycemia protocol in place   - Will need outpatient follow up with Endocrine      # Diarrhea - Ongoing.  Possibly 2/2 mycophenolate toxicity given results of recent bx, as above.  CMV, H.pylori neg.  Previous stool LEONA negative.  C diff negative x 2 (12/2, 12/10).       # Severe protein calorie malnutrition in setting of acute on chronic illness - Improving.  Poor PO intake prior to admission d/t nausea and confusion.  Started TF via NJ during hospitalization but eager to have NJ removed.      - Okay to hold TFs tonight if NJ continued to be clogged   - Reassess on 12/16 if TFs still appropriate based on calorie counts    - Calorie counts per Nutrition  - Continue supplementation w/ B12, multivitamins   - Encourage PO hydration   - BMP, Mag, Phos q Mon, Thur    # Adjustment disorder w/ depressed mood  # Hx bipolar I disorder   # Anxiety   Per chart review, had been on and off various mood stabilizers, antidepressants, and antipsychotics during early 20s and treated with lithium for ~ 15 years.  Seen by Psychiatry during hospitalization and started on Mirtazapine 15mg at HS.  Seen by Health Psychology during hospital stay, recommended CBT interventions and outpt follow up.    - Continue Mirtazapine 15mg at HS   - Will reconsult Health Psychology pending rehab course         Stable/chronic medical issues:   # HTN - BPs well controlled today.  Continue Coreg 12.5mg BID.    # HLD - Continue PTA statin and daily ASA.  # GERD - Continue daily PPI.    # BPH - Continue Flomax 0.4mg daily.    # Vitamin D deficiency - Continue supplementation 2000 units daily.   #  "Electrolyte derangements - In setting of recovering JERONIMO, malnutrition, diarrhea.  Continue neutra phos BID, sodium bicarb BID, and mag oxide BID.    # Moderate non-proliferative diabetic retinopathy of both eyes - Also w/ hx of macular edema and chronic dry eyes.  Will need to follow up with Dr. Martin after discharge, missed last appointment due to hospitalization.         Resolved hospital problems:  # JERONIMO - Cr elevated to 3.2 on hospital admission, improved to 1.52 as of 12/10.  Will continue to monitor renal function.  Trend BMP q Mon, Thur.  Avoid/minimize nephrotoxic agents and hypotension as able.  Encourage PO hydration.    Consulting Services: PT, OT      CODE: FULL   DVT: PCDs, mechanical   Diet: Regular diet, cycled TF w/ Nepro  Indications for Psychotropic Medications: Bipolar 1 disorder, anxiety, adjustment disorder w/ depression  Vaccinations: PCV 13 given on 10/1/2019  Disposition: Anticipate discharge home on 12/18       Melissa Larry, CNP, APRN  Internal Medicine [facility] Hospitalist  [facility]  Pager [phone]    ________________________________________________________________    Subjective & Interval History:      Miller is resting in bed.  Reports feeling unwell and has a headache, which he attributes to the dextrose infusion.  Eager to have NJ tube removed.  Will try and increase PO intake.  Otherwise feels okay, no acute medical complaints.      Last 24 hour care team notes reviewed.   ROS: 4 point ROS (including Respiratory, CV, GI and ) was performed and negative unless otherwise noted in HPI.     Medications: Reviewed in EPIC.    Physical Exam:    Blood pressure 134/64, pulse 90, temperature 97.6  F (36.4  C), temperature source Oral, resp. rate 18, height 1.753 m (5' 9\"), weight 76 kg (167 lb 8 oz), SpO2 97 %.    GENERAL: Alert and oriented x 3. Well nourished, well developed.  No acute distress.    HEENT: Normocephalic, atraumatic. Anicteric sclera. Mucous membranes moist. "   CV: RRR. S1, S2. No murmurs appreciated.   RESPIRATORY: Effort normal on room air. Lungs CTAB with no wheezing, rales, or rhonchi.   GI: Abdomen soft and non distended, bowel sounds present x all 4 quadrants. Slight TTP that is not new over right upper/lower quadrants.    NEUROLOGICAL: No focal deficits. Follows commands.  Strength equal in upper and lower extremities.   MUSCULOSKELETAL: No joint swelling or tenderness. Moves all extremities.   EXTREMITIES: No gross deformities. No peripheral edema.   SKIN: Grossly warm, dry, and intact. No jaundice. No rashes.       Lines/Tubes/Drains:   Peripheral IV 12/14/19 Left Upper arm (Active)   Number of days: 1           ROUTINE IP LABS (Last four results)  CMP   Recent Labs   Lab 12/12/19  0730 12/10/19  0517 12/09/19  0620    138 138   POTASSIUM 5.1 4.2 4.2   CHLORIDE 106 107 110*   CO2 26 24 22   ANIONGAP 6 6 6   * 203* 232*   BUN 38* 34* 30   CR 1.49* 1.52* 1.58*   DEBORAH 8.9 8.6 8.7   MAG 1.7 1.6 1.7   PHOS 2.8 2.2*  --    PROTTOTAL 6.5* 6.0* 5.9*   ALBUMIN 2.6* 2.4* 2.3*   BILITOTAL 0.4 0.2 0.2   ALKPHOS 165* 162* 155*   AST 20 17 16   ALT 32 29 28     CBC   Recent Labs   Lab 12/12/19  0730 12/10/19  0517 12/09/19  0620   WBC 4.9 5.0 4.6   RBC 2.49* 2.35* 2.34*   HGB 7.5* 7.1* 7.1*   HCT 23.4* 21.5* 21.7*   MCV 94 92 93   MCH 30.1 30.2 30.3   MCHC 32.1 33.0 32.7   RDW 17.3* 17.2* 17.3*   PLT 85* 79* 78*     INR No lab results found in last 7 days.

## 2019-12-15 NOTE — PLAN OF CARE
"Patient woke up confused stated \" what's going on\". Patient questioned this RN why TF was off and dextrose 10 % 1,000 mL infusing the patient stated that makes his blood sugar elevated, he felt that he was dehydrated. Explained to patient TF was clogged that's why D10 is running to prevent hypoglycemia while the TF if off. Encouraged patient to drink water and he responded \"is that all you can tell me, I feel dehydrated\". BG- 261, insulin given 3 units as ordered. Patient wanted to talk to MD. Sticky note left to MD. PIV intact to L upper arm, D10 running without difficulty. No respiratory distress noted. Patient is discharging on Wednesday. Patient uses urinal @ night. Will continue with current plan of care.  "

## 2019-12-15 NOTE — PLAN OF CARE
Focus: Physio  D: Alert and orientated times. Forgetful.  NJ tube is now unplugged and flushes well. He states his appetite is improving and that he wants his tube out as soon as he can have it out. Up with standby assist and gait belt. Incisions are healing up nicely. Continent of bowel and bladder. Last bm today after lunch. P: Continue current plan of care.

## 2019-12-15 NOTE — PLAN OF CARE
Patient alert and oriented x4, able to make needs known. NJ tube clogged at beginning of shift, RN flyer came to try and declog tube with coke, unsuccessful. Tube feeding started, only able to get in 25 mL until tube clogged again. D10 started at rate of 60mL/hr (tube feeding rate) for 12 hours to prevent hypoglycemia. PIV placed by flyer in L upper forearm.  and 201, carb coverage and sliding scale insulin given. Humulin insulin not given due to not being able to administer tube feeding.   Complaints of headache at start of shift, declined PRN tylenol, encouraged to drink water. Pt hoping to get NJ tube pulled prior to discharge on Wednesday. ABD incision CDI, SUDHA. No other complaints of pain, SOB, chest pain or nausea. BG checks remain Q4hrs. No other concerns.

## 2019-12-16 ENCOUNTER — TELEPHONE (OUTPATIENT)
Dept: TRANSPLANT | Facility: CLINIC | Age: 54
End: 2019-12-16

## 2019-12-16 LAB
ALBUMIN SERPL-MCNC: 2.9 G/DL (ref 3.4–5)
ALP SERPL-CCNC: 220 U/L (ref 40–150)
ALT SERPL W P-5'-P-CCNC: 43 U/L (ref 0–70)
ANION GAP SERPL CALCULATED.3IONS-SCNC: 4 MMOL/L (ref 3–14)
AST SERPL W P-5'-P-CCNC: 24 U/L (ref 0–45)
BILIRUB SERPL-MCNC: 0.4 MG/DL (ref 0.2–1.3)
BUN SERPL-MCNC: 43 MG/DL (ref 7–30)
CALCIUM SERPL-MCNC: 9.2 MG/DL (ref 8.5–10.1)
CHLORIDE SERPL-SCNC: 103 MMOL/L (ref 94–109)
CO2 SERPL-SCNC: 28 MMOL/L (ref 20–32)
CREAT SERPL-MCNC: 1.65 MG/DL (ref 0.66–1.25)
ERYTHROCYTE [DISTWIDTH] IN BLOOD BY AUTOMATED COUNT: 17.5 % (ref 10–15)
GFR SERPL CREATININE-BSD FRML MDRD: 46 ML/MIN/{1.73_M2}
GLUCOSE BLDC GLUCOMTR-MCNC: 144 MG/DL (ref 70–99)
GLUCOSE BLDC GLUCOMTR-MCNC: 188 MG/DL (ref 70–99)
GLUCOSE BLDC GLUCOMTR-MCNC: 210 MG/DL (ref 70–99)
GLUCOSE BLDC GLUCOMTR-MCNC: 248 MG/DL (ref 70–99)
GLUCOSE BLDC GLUCOMTR-MCNC: 292 MG/DL (ref 70–99)
GLUCOSE BLDC GLUCOMTR-MCNC: 308 MG/DL (ref 70–99)
GLUCOSE SERPL-MCNC: 309 MG/DL (ref 70–99)
HCT VFR BLD AUTO: 22.9 % (ref 40–53)
HGB BLD-MCNC: 7.5 G/DL (ref 13.3–17.7)
MAGNESIUM SERPL-MCNC: 1.7 MG/DL (ref 1.6–2.3)
MCH RBC QN AUTO: 30.9 PG (ref 26.5–33)
MCHC RBC AUTO-ENTMCNC: 32.8 G/DL (ref 31.5–36.5)
MCV RBC AUTO: 94 FL (ref 78–100)
PHOSPHATE SERPL-MCNC: 2.2 MG/DL (ref 2.5–4.5)
PLATELET # BLD AUTO: 125 10E9/L (ref 150–450)
POTASSIUM SERPL-SCNC: 5.3 MMOL/L (ref 3.4–5.3)
PROT SERPL-MCNC: 7.1 G/DL (ref 6.8–8.8)
RBC # BLD AUTO: 2.43 10E12/L (ref 4.4–5.9)
SODIUM SERPL-SCNC: 135 MMOL/L (ref 133–144)
TACROLIMUS BLD-MCNC: 7.7 UG/L (ref 5–15)
TME LAST DOSE: NORMAL H
WBC # BLD AUTO: 4.2 10E9/L (ref 4–11)

## 2019-12-16 PROCEDURE — 25000131 ZZH RX MED GY IP 250 OP 636 PS 637: Mod: GY | Performed by: PHYSICIAN ASSISTANT

## 2019-12-16 PROCEDURE — 84100 ASSAY OF PHOSPHORUS: CPT | Performed by: NURSE PRACTITIONER

## 2019-12-16 PROCEDURE — 97116 GAIT TRAINING THERAPY: CPT | Mod: GP

## 2019-12-16 PROCEDURE — 25000131 ZZH RX MED GY IP 250 OP 636 PS 637: Mod: GY | Performed by: NURSE PRACTITIONER

## 2019-12-16 PROCEDURE — 25000132 ZZH RX MED GY IP 250 OP 250 PS 637: Mod: GY | Performed by: NURSE PRACTITIONER

## 2019-12-16 PROCEDURE — 25000131 ZZH RX MED GY IP 250 OP 636 PS 637: Mod: GY | Performed by: HOSPITALIST

## 2019-12-16 PROCEDURE — 85027 COMPLETE CBC AUTOMATED: CPT | Performed by: NURSE PRACTITIONER

## 2019-12-16 PROCEDURE — 80197 ASSAY OF TACROLIMUS: CPT | Performed by: NURSE PRACTITIONER

## 2019-12-16 PROCEDURE — 12000022 ZZH R&B SNF

## 2019-12-16 PROCEDURE — 83735 ASSAY OF MAGNESIUM: CPT | Performed by: NURSE PRACTITIONER

## 2019-12-16 PROCEDURE — 25000132 ZZH RX MED GY IP 250 OP 250 PS 637: Mod: GY | Performed by: HOSPITALIST

## 2019-12-16 PROCEDURE — 36415 COLL VENOUS BLD VENIPUNCTURE: CPT | Performed by: NURSE PRACTITIONER

## 2019-12-16 PROCEDURE — 00000146 ZZHCL STATISTIC GLUCOSE BY METER IP

## 2019-12-16 PROCEDURE — 97110 THERAPEUTIC EXERCISES: CPT | Mod: GP

## 2019-12-16 PROCEDURE — 97535 SELF CARE MNGMENT TRAINING: CPT | Mod: GO

## 2019-12-16 PROCEDURE — 80053 COMPREHEN METABOLIC PANEL: CPT | Performed by: NURSE PRACTITIONER

## 2019-12-16 RX ORDER — PREDNISONE 5 MG/1
5 TABLET ORAL DAILY
Status: DISCONTINUED | OUTPATIENT
Start: 2019-12-16 | End: 2019-12-18 | Stop reason: HOSPADM

## 2019-12-16 RX ORDER — AZATHIOPRINE 50 MG/1
150 TABLET ORAL DAILY
Status: DISCONTINUED | OUTPATIENT
Start: 2019-12-17 | End: 2019-12-18 | Stop reason: HOSPADM

## 2019-12-16 RX ADMIN — THERA TABS 1 TABLET: TAB at 17:23

## 2019-12-16 RX ADMIN — DIBASIC SODIUM PHOSPHATE, MONOBASIC POTASSIUM PHOSPHATE AND MONOBASIC SODIUM PHOSPHATE 250 MG: 852; 155; 130 TABLET ORAL at 08:17

## 2019-12-16 RX ADMIN — TENOFOVIR DISOPROXIL FUMARATE 300 MG: 300 TABLET, COATED ORAL at 08:17

## 2019-12-16 RX ADMIN — MAGNESIUM OXIDE TAB 400 MG (241.3 MG ELEMENTAL MG) 400 MG: 400 (241.3 MG) TAB at 21:57

## 2019-12-16 RX ADMIN — SIMETHICONE CHEW TAB 80 MG 80 MG: 80 TABLET ORAL at 20:58

## 2019-12-16 RX ADMIN — OMEPRAZOLE 40 MG: 20 CAPSULE, DELAYED RELEASE ORAL at 08:18

## 2019-12-16 RX ADMIN — Medication 1 PACKET: at 21:58

## 2019-12-16 RX ADMIN — PRENATAL VITAMINS-IRON FUMARATE 27 MG IRON-FOLIC ACID 0.8 MG TABLET 1 TABLET: at 08:17

## 2019-12-16 RX ADMIN — MICONAZOLE NITRATE: 20 CREAM TOPICAL at 09:00

## 2019-12-16 RX ADMIN — FERROUS SULFATE TAB 325 MG (65 MG ELEMENTAL FE) 325 MG: 325 (65 FE) TAB at 08:18

## 2019-12-16 RX ADMIN — CYANOCOBALAMIN TAB 1000 MCG 1000 MCG: 1000 TAB at 17:23

## 2019-12-16 RX ADMIN — INSULIN ASPART 4 UNITS: 100 INJECTION, SOLUTION INTRAVENOUS; SUBCUTANEOUS at 08:30

## 2019-12-16 RX ADMIN — INSULIN ASPART 1 UNITS: 100 INJECTION, SOLUTION INTRAVENOUS; SUBCUTANEOUS at 18:06

## 2019-12-16 RX ADMIN — INSULIN ASPART 3 UNITS: 100 INJECTION, SOLUTION INTRAVENOUS; SUBCUTANEOUS at 13:44

## 2019-12-16 RX ADMIN — SULFAMETHOXAZOLE AND TRIMETHOPRIM 1 TABLET: 400; 80 TABLET ORAL at 08:17

## 2019-12-16 RX ADMIN — Medication 1 PACKET: at 08:59

## 2019-12-16 RX ADMIN — FERROUS SULFATE TAB 325 MG (65 MG ELEMENTAL FE) 325 MG: 325 (65 FE) TAB at 13:43

## 2019-12-16 RX ADMIN — DIBASIC SODIUM PHOSPHATE, MONOBASIC POTASSIUM PHOSPHATE AND MONOBASIC SODIUM PHOSPHATE 250 MG: 852; 155; 130 TABLET ORAL at 21:58

## 2019-12-16 RX ADMIN — PREDNISONE 5 MG: 5 TABLET ORAL at 18:03

## 2019-12-16 RX ADMIN — CARVEDILOL 12.5 MG: 3.12 TABLET, FILM COATED ORAL at 17:22

## 2019-12-16 RX ADMIN — TACROLIMUS 4.5 MG: 1 CAPSULE ORAL at 08:18

## 2019-12-16 RX ADMIN — SODIUM BICARBONATE 650 MG TABLET 650 MG: at 21:57

## 2019-12-16 RX ADMIN — CARVEDILOL 12.5 MG: 3.12 TABLET, FILM COATED ORAL at 08:17

## 2019-12-16 RX ADMIN — AZATHIOPRINE 100 MG: 50 TABLET ORAL at 08:17

## 2019-12-16 RX ADMIN — ASPIRIN 325 MG: 325 TABLET, DELAYED RELEASE ORAL at 08:18

## 2019-12-16 RX ADMIN — TAMSULOSIN HYDROCHLORIDE 0.4 MG: 0.4 CAPSULE ORAL at 21:57

## 2019-12-16 RX ADMIN — MIRTAZAPINE 15 MG: 7.5 TABLET, FILM COATED ORAL at 21:57

## 2019-12-16 RX ADMIN — TACROLIMUS 5.5 MG: 5 CAPSULE ORAL at 17:22

## 2019-12-16 RX ADMIN — ROSUVASTATIN CALCIUM 5 MG: 5 TABLET, FILM COATED ORAL at 21:58

## 2019-12-16 RX ADMIN — SODIUM BICARBONATE 650 MG TABLET 650 MG: at 08:18

## 2019-12-16 RX ADMIN — VALGANCICLOVIR 450 MG: 450 TABLET, FILM COATED ORAL at 08:18

## 2019-12-16 RX ADMIN — MAGNESIUM OXIDE TAB 400 MG (241.3 MG ELEMENTAL MG) 400 MG: 400 (241.3 MG) TAB at 08:18

## 2019-12-16 RX ADMIN — INSULIN GLARGINE 14 UNITS: 100 INJECTION, SOLUTION SUBCUTANEOUS at 08:28

## 2019-12-16 RX ADMIN — MELATONIN 2000 UNITS: at 17:23

## 2019-12-16 RX ADMIN — FERROUS SULFATE TAB 325 MG (65 MG ELEMENTAL FE) 325 MG: 325 (65 FE) TAB at 17:23

## 2019-12-16 ASSESSMENT — MIFFLIN-ST. JEOR: SCORE: 1581.98

## 2019-12-16 NOTE — PROGRESS NOTES
Pt is A&Ox4, VSS and denies any pain. Pt is MOD I in room and is continent using toilet. Pt has a patent and SL L upper arm PIV and a patent NG tube. Pt has an incision on abd that is healing and free of bleeding/drainage or Sx of infection.

## 2019-12-16 NOTE — PROGRESS NOTES
Met with patient and Tereza from [facility] this morning to discuss discharge plan. Plan for patient to discharge home this Wednesday 12/18 with [facility] SN/PT/OT and resume/increase PCA services with [facility]. Patient was pleased to hear he will likely not go home with tube feed. Fallon from [facility] will plan to pick patient up on 12/18 at 11 AM for discharge. SW will continue to follow and assist as needed.    ALEXYS Montez,FARHAD  TCU    Phone: [phone]  Pager: [phone]

## 2019-12-16 NOTE — TELEPHONE ENCOUNTER
Out of the office. Spoke with patient but he is discussing with Steven, care builders, about his discharge. And prefers to have this writer call back.

## 2019-12-16 NOTE — PROGRESS NOTES
Calorie Counts    12/14:1084 kcal and 51 g protein (2 meals, 0 supplements, ordered dinner but no documentation)  12/15:1618 kcal and 79 g protein (3 meals, 1 supplements)    2 day average PO intake = 1351 kcal (58% minimum energy needs) and 65 g protein (69% minimum protein needs)      -On 12/14  feeding tube clogged per RN at 1754. Start D10 @ 60 ml/hr for 12 hours starting at night of 12/14 per RN   -NJ tube unplugged on 12/15 per RN's note at 1438.    Intervention:   - Pt has good appetite and improving PO intake, hold TF. Discuss with provider and endocrine  - Continue calorie counts  - If PO intake continues improving, pull the feeding tube prior to discharge    Ailyn Hickman  Dietetic Intern

## 2019-12-16 NOTE — TELEPHONE ENCOUNTER
Spoke with patient. He is confused on what really happened. He is scared about the tube feeding and doesn't want to bring that home as he doesn't feel he can manage it, plus take his pills.  Discussed why patient changed from cellcept to imuran.

## 2019-12-16 NOTE — PLAN OF CARE
OT: Pt CGA progressing to ind for step-in tub transfer, education in safety provided.  Pt able to retrieve and transport items in the kitchen mod I with SEC.  On track for discharge 12/18.

## 2019-12-16 NOTE — TELEPHONE ENCOUNTER
Patient's care giver called has questions regarding patient's discharging from the TCU on this 12/18/19

## 2019-12-16 NOTE — PLAN OF CARE
Pt A&Ox4, able to make needs known. Denies pain and SOB. TF started at 1830, running at 60mL/hr. NJ tube flushed without problems. Good appetite. Blood sugars 163 and 139. Modified independent in room. Call light within reach, will continue to monitor.

## 2019-12-16 NOTE — UTILIZATION REVIEW
1. Have you had pain or hurting at any time in the last 5 days?     [x]YES  []NO  []UNABLE TO ANSWER    2. How much of the time have you experienced pain or hurting over the last 5 days?    []ALMOST CONSTANTLY [x]FREQUENTLY []OCCASIONALLY []RARELY []UNABLE TO ANSWER    3. Over the past 5 days, has pain made it hard for you to sleep at night?     [x]YES  []NO  []UNABLE TO ANSWER    4. Over the past 5 days, have you limited your day-to-day activities because of pain?     []YES  [x]NO  []UNABLE TO ANSWER    5. Pain intensity (Numeric scale used first-if patient unable to answer, verbal scale to be used.)    Numeric Scale: Please rate your worst pain over the last 5 days on a zero to ten scale, with zero being no pain and ten being the worst pain you can imagine.     Number:         7 /10    Verbal Scale: Please rate the intensity of your worst pain over the last 5 days.     []MILD []MODERATE []SEVERE []VERY SEVERE/HORRIBLE []UNABLE TO ANSWER

## 2019-12-16 NOTE — PROGRESS NOTES
"Met with patient to update the discharge planning discussion. He will discharge to home 12/18/2019 if he remains medically stable. He is eating better and states he will not be able to manage tube feeding at home, is very determined to get off the tube feeding. Per RD notes, tube feeding is being held, and if he continues to do well the tube can be pulled. PLC enteral teaching happened today, and he stated it \"scared the hell out of me\". He will resume home care SN/PT/OT services with [facility], requesting RN visit on Thursday 12/19 for labs and help with med review and setup. Updated [facility] and [facility] on patient status and discharge plan, though hopefully will not need the home enteral with [facility]. Updated transplant coordinator Crystal on patient status and discharge plan. TCU SW working with patient on transportation needs for discharge.  "

## 2019-12-16 NOTE — PLAN OF CARE
Brief medicine note:  - D/w nutrition and hold NPH tonight as we are trialing pt off TFs. Continue with other insulin regimen as ordered.       Willie Park PA-C  Internal Medicine Hospitalist   [facility]  [phone]

## 2019-12-16 NOTE — DISCHARGE INSTRUCTIONS
Your Transplant Coordinator RN is Radha Ndiaye [phone]    Transplant lab letter-         Solid Organ Transplant        [facility] [address]  [address]  [phone] or [phone]         OUTPATIENT OR TRANSITIONAL CARE  LABORATORY TEST ORDER     Patient Name:    Frandy Workman                              Transplant Date:   2019   YOB: 1965                                               Issue Date:            19   [facility] MR#:    [id number]                                           Expiration Date:   2020         Diagnoses:            [x]?      Liver Transplant (ICD-10 Z94.4)                                 [x]?      Long term use of medications (ICD-10 Z79.899)      Please fax results to [phone]     Frequency: twice weekly and PRN        [x]?         CBC with Platelets   [x]?         Basic Metabolic Panel (Sodium, Potassium, Chloride, CO2, Creatinine, Urea Nitrogen, Glucose, Calcium)  [x]?         Phosphorous, Magnesium  [x]?         Hepatic panel (Albumin, Alk Phos, ALT, AST, Direct and Total Bili)  [x]?         Tacrolimus drug level - 12 hour trough, please document time of last dose            If you have questions, please call [phone] or [phone].     .  [facility] represents a collaboration between [facility] and [facility].        RE: Frandy Workman    MRN: [id number]   : 1965   ENC DATE: 2019   PAGE:

## 2019-12-16 NOTE — PROGRESS NOTES
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    Graft function: Alk phos 220 (from 165); LFTs otherwise stable. Monitor.    Immunosuppression:   -Imuran 100mg daily. 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 WBC stable; increase to 150mg daily.  -Prograf 4.5 BID. Tac level today 7.7 (~9.5hr trough). Goal 10-12 (12hr trough). Increase to 5.5mg BID.  -Initiate prednisone 5mg daily as tacrolimus is subtherapeutic.    Prophylaxis: PJP (Bactrim), viral (Valcyte)    Wendy Donahue NP [id number]  Transplant Surgery

## 2019-12-16 NOTE — PLAN OF CARE
Pt alert and oriented x4. Able to make needs known. Denies pain, denies SOB. Had TF running @ 65ml/hr throughout the night. Stopped at 0630, disconnected from pt and flushed.  and 292 this shift. Call light in reach. Continue w/ POC.

## 2019-12-16 NOTE — PLAN OF CARE
PTA: AM PT session cx'd as pt busy with nsg cares, breakfast. Escorted pt down to his PLC appt. Pt amb room>PLC using SEC [place].

## 2019-12-17 LAB
GLUCOSE BLDC GLUCOMTR-MCNC: 177 MG/DL (ref 70–99)
GLUCOSE BLDC GLUCOMTR-MCNC: 217 MG/DL (ref 70–99)
GLUCOSE BLDC GLUCOMTR-MCNC: 219 MG/DL (ref 70–99)
GLUCOSE BLDC GLUCOMTR-MCNC: 271 MG/DL (ref 70–99)
GLUCOSE BLDC GLUCOMTR-MCNC: 276 MG/DL (ref 70–99)

## 2019-12-17 PROCEDURE — 25000132 ZZH RX MED GY IP 250 OP 250 PS 637: Mod: GY | Performed by: NURSE PRACTITIONER

## 2019-12-17 PROCEDURE — 97535 SELF CARE MNGMENT TRAINING: CPT | Mod: GO

## 2019-12-17 PROCEDURE — 97110 THERAPEUTIC EXERCISES: CPT | Mod: GP

## 2019-12-17 PROCEDURE — 00220000 ZZH SNF RUG CODE OPNP

## 2019-12-17 PROCEDURE — 25000132 ZZH RX MED GY IP 250 OP 250 PS 637: Mod: GY | Performed by: HOSPITALIST

## 2019-12-17 PROCEDURE — 25000131 ZZH RX MED GY IP 250 OP 636 PS 637: Mod: GY | Performed by: NURSE PRACTITIONER

## 2019-12-17 PROCEDURE — 00000146 ZZHCL STATISTIC GLUCOSE BY METER IP

## 2019-12-17 PROCEDURE — 12000022 ZZH R&B SNF

## 2019-12-17 PROCEDURE — 99316 NF DSCHRG MGMT 30 MIN+: CPT | Performed by: PHYSICIAN ASSISTANT

## 2019-12-17 PROCEDURE — 97116 GAIT TRAINING THERAPY: CPT | Mod: GP

## 2019-12-17 RX ORDER — OMEPRAZOLE 40 MG/1
40 CAPSULE, DELAYED RELEASE ORAL DAILY
Qty: 90 CAPSULE | Refills: 2 | Status: ON HOLD | OUTPATIENT
Start: 2019-12-17 | End: 2020-06-25

## 2019-12-17 RX ORDER — VALGANCICLOVIR 450 MG/1
450 TABLET, FILM COATED ORAL DAILY
Qty: 30 TABLET | Refills: 5 | Status: SHIPPED | OUTPATIENT
Start: 2019-12-17 | End: 2020-01-31

## 2019-12-17 RX ORDER — PERPHENAZINE 16 MG
600 TABLET ORAL DAILY
Qty: 90 CAPSULE | Refills: 3 | Status: ON HOLD | OUTPATIENT
Start: 2019-12-17 | End: 2020-06-12

## 2019-12-17 RX ORDER — AZATHIOPRINE 75 MG/1
150 TABLET ORAL DAILY
Qty: 60 TABLET | Refills: 0 | Status: SHIPPED | OUTPATIENT
Start: 2019-12-18 | End: 2019-12-18

## 2019-12-17 RX ORDER — ACETAMINOPHEN 325 MG/1
650 TABLET ORAL EVERY 4 HOURS PRN
COMMUNITY
Start: 2019-12-17 | End: 2019-12-18

## 2019-12-17 RX ORDER — SODIUM BICARBONATE 650 MG/1
650 TABLET ORAL 2 TIMES DAILY
Qty: 60 TABLET | Refills: 0 | Status: SHIPPED | OUTPATIENT
Start: 2019-12-17 | End: 2020-02-11

## 2019-12-17 RX ORDER — ASPIRIN 325 MG
325 TABLET, DELAYED RELEASE (ENTERIC COATED) ORAL DAILY
Qty: 30 TABLET | Refills: 3 | Status: SHIPPED | OUTPATIENT
Start: 2019-12-17 | End: 2020-02-11

## 2019-12-17 RX ORDER — MIRTAZAPINE 15 MG/1
15 TABLET, FILM COATED ORAL AT BEDTIME
Qty: 30 TABLET | Refills: 0 | Status: SHIPPED | OUTPATIENT
Start: 2019-12-17 | End: 2019-12-30

## 2019-12-17 RX ORDER — CARVEDILOL 12.5 MG/1
12.5 TABLET ORAL 2 TIMES DAILY WITH MEALS
Qty: 60 TABLET | Refills: 0 | Status: SHIPPED | OUTPATIENT
Start: 2019-12-17 | End: 2020-02-11 | Stop reason: SINTOL

## 2019-12-17 RX ORDER — TAMSULOSIN HYDROCHLORIDE 0.4 MG/1
CAPSULE ORAL
Qty: 90 CAPSULE | Refills: 3 | Status: SHIPPED | OUTPATIENT
Start: 2019-12-17 | End: 2020-07-09

## 2019-12-17 RX ORDER — CHOLECALCIFEROL (VITAMIN D3) 50 MCG
2000 TABLET ORAL DAILY
Qty: 30 TABLET | Refills: 0 | Status: SHIPPED | OUTPATIENT
Start: 2019-12-17 | End: 2020-03-30

## 2019-12-17 RX ORDER — ROSUVASTATIN CALCIUM 5 MG/1
5 TABLET, COATED ORAL DAILY
Qty: 30 TABLET | Refills: 3 | Status: SHIPPED | OUTPATIENT
Start: 2019-12-17 | End: 2020-07-09

## 2019-12-17 RX ORDER — TACROLIMUS 0.5 MG/1
5.5 CAPSULE ORAL 2 TIMES DAILY
Qty: 660 CAPSULE | Refills: 0 | Status: SHIPPED | OUTPATIENT
Start: 2019-12-17 | End: 2019-12-24

## 2019-12-17 RX ORDER — MAGNESIUM OXIDE 400 MG/1
400 TABLET ORAL 2 TIMES DAILY
Qty: 60 TABLET | Refills: 0 | Status: SHIPPED | OUTPATIENT
Start: 2019-12-17 | End: 2019-12-30

## 2019-12-17 RX ORDER — TENOFOVIR DISOPROXIL FUMARATE 300 MG/1
300 TABLET, FILM COATED ORAL DAILY
Qty: 30 TABLET | Refills: 11 | Status: SHIPPED | OUTPATIENT
Start: 2019-12-17 | End: 2020-01-31

## 2019-12-17 RX ORDER — SULFAMETHOXAZOLE AND TRIMETHOPRIM 400; 80 MG/1; MG/1
1 TABLET ORAL DAILY
Qty: 30 TABLET | Refills: 11 | Status: SHIPPED | OUTPATIENT
Start: 2019-12-17 | End: 2020-02-05

## 2019-12-17 RX ORDER — FERROUS SULFATE 325(65) MG
325 TABLET ORAL
Qty: 90 TABLET | Refills: 3 | Status: SHIPPED | OUTPATIENT
Start: 2019-12-17 | End: 2020-02-11

## 2019-12-17 RX ORDER — PREDNISONE 5 MG/1
5 TABLET ORAL DAILY
Qty: 30 TABLET | Refills: 0 | Status: SHIPPED | OUTPATIENT
Start: 2019-12-18 | End: 2020-01-13

## 2019-12-17 RX ADMIN — TACROLIMUS 5.5 MG: 5 CAPSULE ORAL at 07:47

## 2019-12-17 RX ADMIN — TAMSULOSIN HYDROCHLORIDE 0.4 MG: 0.4 CAPSULE ORAL at 21:12

## 2019-12-17 RX ADMIN — TACROLIMUS 5.5 MG: 5 CAPSULE ORAL at 18:09

## 2019-12-17 RX ADMIN — ASPIRIN 325 MG: 325 TABLET, DELAYED RELEASE ORAL at 07:48

## 2019-12-17 RX ADMIN — PREDNISONE 5 MG: 5 TABLET ORAL at 07:47

## 2019-12-17 RX ADMIN — CARVEDILOL 12.5 MG: 3.12 TABLET, FILM COATED ORAL at 18:09

## 2019-12-17 RX ADMIN — INSULIN GLARGINE 14 UNITS: 100 INJECTION, SOLUTION SUBCUTANEOUS at 07:55

## 2019-12-17 RX ADMIN — PRENATAL VITAMINS-IRON FUMARATE 27 MG IRON-FOLIC ACID 0.8 MG TABLET 1 TABLET: at 07:47

## 2019-12-17 RX ADMIN — MICONAZOLE NITRATE: 20 CREAM TOPICAL at 07:56

## 2019-12-17 RX ADMIN — THERA TABS 1 TABLET: TAB at 16:44

## 2019-12-17 RX ADMIN — OMEPRAZOLE 40 MG: 20 CAPSULE, DELAYED RELEASE ORAL at 07:47

## 2019-12-17 RX ADMIN — SODIUM BICARBONATE 650 MG TABLET 650 MG: at 07:54

## 2019-12-17 RX ADMIN — INSULIN ASPART 2 UNITS: 100 INJECTION, SOLUTION INTRAVENOUS; SUBCUTANEOUS at 12:32

## 2019-12-17 RX ADMIN — INSULIN ASPART 2 UNITS: 100 INJECTION, SOLUTION INTRAVENOUS; SUBCUTANEOUS at 18:12

## 2019-12-17 RX ADMIN — MAGNESIUM OXIDE TAB 400 MG (241.3 MG ELEMENTAL MG) 400 MG: 400 (241.3 MG) TAB at 21:12

## 2019-12-17 RX ADMIN — FERROUS SULFATE TAB 325 MG (65 MG ELEMENTAL FE) 325 MG: 325 (65 FE) TAB at 12:32

## 2019-12-17 RX ADMIN — MIRTAZAPINE 15 MG: 7.5 TABLET, FILM COATED ORAL at 21:12

## 2019-12-17 RX ADMIN — CYANOCOBALAMIN TAB 1000 MCG 1000 MCG: 1000 TAB at 16:44

## 2019-12-17 RX ADMIN — AZATHIOPRINE 150 MG: 50 TABLET ORAL at 07:47

## 2019-12-17 RX ADMIN — SULFAMETHOXAZOLE AND TRIMETHOPRIM 1 TABLET: 400; 80 TABLET ORAL at 07:47

## 2019-12-17 RX ADMIN — FERROUS SULFATE TAB 325 MG (65 MG ELEMENTAL FE) 325 MG: 325 (65 FE) TAB at 07:47

## 2019-12-17 RX ADMIN — ROSUVASTATIN CALCIUM 5 MG: 5 TABLET, FILM COATED ORAL at 21:12

## 2019-12-17 RX ADMIN — MICONAZOLE NITRATE: 20 CREAM TOPICAL at 21:13

## 2019-12-17 RX ADMIN — VALGANCICLOVIR 450 MG: 450 TABLET, FILM COATED ORAL at 07:48

## 2019-12-17 RX ADMIN — DIBASIC SODIUM PHOSPHATE, MONOBASIC POTASSIUM PHOSPHATE AND MONOBASIC SODIUM PHOSPHATE 250 MG: 852; 155; 130 TABLET ORAL at 07:55

## 2019-12-17 RX ADMIN — INSULIN ASPART 3 UNITS: 100 INJECTION, SOLUTION INTRAVENOUS; SUBCUTANEOUS at 07:52

## 2019-12-17 RX ADMIN — CARVEDILOL 12.5 MG: 3.12 TABLET, FILM COATED ORAL at 07:46

## 2019-12-17 RX ADMIN — DIBASIC SODIUM PHOSPHATE, MONOBASIC POTASSIUM PHOSPHATE AND MONOBASIC SODIUM PHOSPHATE 250 MG: 852; 155; 130 TABLET ORAL at 21:12

## 2019-12-17 RX ADMIN — MAGNESIUM OXIDE TAB 400 MG (241.3 MG ELEMENTAL MG) 400 MG: 400 (241.3 MG) TAB at 07:55

## 2019-12-17 RX ADMIN — FERROUS SULFATE TAB 325 MG (65 MG ELEMENTAL FE) 325 MG: 325 (65 FE) TAB at 18:09

## 2019-12-17 RX ADMIN — MELATONIN 2000 UNITS: at 16:44

## 2019-12-17 RX ADMIN — Medication 1 PACKET: at 07:56

## 2019-12-17 RX ADMIN — TENOFOVIR DISOPROXIL FUMARATE 300 MG: 300 TABLET, COATED ORAL at 07:48

## 2019-12-17 RX ADMIN — Medication 600 MG: at 16:46

## 2019-12-17 RX ADMIN — SODIUM BICARBONATE 650 MG TABLET 650 MG: at 21:12

## 2019-12-17 ASSESSMENT — MIFFLIN-ST. JEOR
SCORE: 1570.38
SCORE: 1577.9

## 2019-12-17 NOTE — PLAN OF CARE
OT: Shower assessment completed with mod I alternating sitting/standing. Pt independent with dressing.

## 2019-12-17 NOTE — PLAN OF CARE
Pt !&)x4, able to make needs known. C/o abdominal pain around old incision, declined pain medication. Incision CDI.  and 217, insulin given per orders. NJ tube intact and patent. Modified independent in room. Continent of bowel and bladder. Log book updated. Plan to discharge tomorrow at 11 am. Call light within reach, will continue to monitor.

## 2019-12-17 NOTE — PROGRESS NOTES
Calorie Counts    12/15:1618 kcal and 79 g protein (3 meals, 1 supplements)  12/16: 1569 kcal and 74 g protein (3 meals, 2 supplements)    2 day average PO intake = 1595 kcal (68% minimum energy needs) and 77 g protein (81% minimum protein needs)    Interventions:  Recommend removing TF given 2 days of improved intakes and likely discharging tomorrow. D/w pt and provider. Encourage pt to continue drinking 2-3 Boost daily.       Celena Mcfarland, ISRA, LD  Unit Pager: [phone]

## 2019-12-17 NOTE — DISCHARGE SUMMARY
[facility]  Hospitalist Discharge Summary       Date of Admission:  12/10/2019  Date of Discharge:  12/18/2019  Discharging Provider: Bebeto Park PA-C      Discharge Diagnoses   Weakness  S/p orthotopic liver transplant (11/11/19) 2/2 ESLD r/t ESTRADA, HCC  Type 2 DM   Diarrhea  Severe protein calorie malnutrition in setting of acute on chronic illness  Adjustment disorder w/ depressed mood  Hx bipolar I disorder   Anxiety   HTN   HLD  GERD  BPH   Vitamin D deficiency  Moderate non-proliferative diabetic retinopathy of both eyes  JERONIMO on CKD    Follow-ups Needed After Discharge   Follow-up Appointments     Adult [facility] Follow-up and recommended labs and tests      Follow up with Dr. Ramos in 1-2 weeks    Follow up with transplant hepatology in 1 month.     Follow up with Oncology in 1-2 months.     Follow up with your family physician in 2-4 weeks.       Appointments on [place] and/or [facility] (with [facility] or [facility]   provider or service). Call [phone] if you haven't heard regarding   these appointments within 7 days of discharge.             Discharge Disposition   Discharged to home  Condition at discharge: Stable    Hospital Course   Frandy Workman is a 54 year old male with a history of liver cirrhosis 2/2 ESTRADA, HCC (dx 2018), now s/p liver transplant on 11/11/19, HTN, HLD, type 2 DM, GERD, CKD and BPH admitted to [facility] 12/2-12/10 for JERONIMO, nausea, diarrhea, confusion, and weakness.       # Weakness: Admitted from clinic on 12/2 after labs significant for JERONIMO and reports of nausea, vomiting, weakness, and poor appetite x 1 week.  His primary caregiver left town after he was discharged from the hospital following his transplant surgery, and now only has PCA care about 3hr/week.  Overall improved significantly w/ therapies and motivated.          # S/p orthotopic liver transplant (11/11/19) 2/2 ESLD r/t ESTRADA, HCC  LFTs stable.  Donor HBcAb+, on tenofovir 100mg  indefinitely.  Underwent upper endoscopy and colonoscopy on 12/6 d/t persistent nausea and diarrhea and found to have mild gastropathy; duodenal, stomach, and colon bx + mycophenolate toxicity. Negative for CMV and H.pylori.  Mycophenolate stopped as of evening 12/10 and started on Imuran 150mg daily per Transplant.  Last tacro level subtherapeutic on 12/16 so dose increased.              - IS:  Tacrolimus 5.5mg BID (goal 10-12; last adjusted on 12/16), Imuran 150mg daily and prednisone 5 mg daily  - Ppx:  Continue Bactrim, Tenofovir, and Valcyte   - Continue post transplant labs qMR: CBC, BMP, hepatic panel, Mag, Phos and tacro level  - Continue PPI daily     # Type 2 DM: Last Hgb A1c 5.1% on 12/2.  Dx at age 29 and has required insulin since 2001.  Additionally, prior to transplant had been maintained on Tresiba 55 units, Metformin ER 500mg w/ supper, and Farxiga 10mg daily.  PTA regimen discontinued. BGs stable now off TFs. Continue Lantus 14 units daily, carb coverage 1u: 10g CHO, MSSI. Follow up with Endocrine after discharge.      # Diarrhea: Ongoing. Possibly 2/2 mycophenolate toxicity given results of recent bx, as above. CMV, H.pylori neg. Previous stool LEONA negative. C diff negative x 2 (12/2, 12/10).      # Severe protein calorie malnutrition in setting of acute on chronic illness: Poor PO intake prior to admission d/t nausea and confusion. Started TF via NJ during hospitalization but removed 12/17 due to significantly improved PO intake per nutrition. Continue supplementation w/ B12, multivitamins.      # Adjustment disorder w/ depressed mood  # Hx bipolar I disorder   # Anxiety   Per chart review, had been on and off various mood stabilizers, antidepressants, and antipsychotics during early 20s and treated with lithium for ~ 15 years.  Seen by Psychiatry during hospitalization and started on Mirtazapine 15mg at HS.  Seen by Health Psychology during hospital stay, recommended CBT interventions and outpt  follow up.    - Continue Mirtazapine 15mg at HS      Stable/chronic medical issues:   # HTN - BPs controlled.  Continue Coreg 12.5mg BID.    # HLD - Continue PTA statin and daily ASA.  # GERD - Continue daily PPI.    # BPH - Continue Flomax 0.4mg daily.    # Vitamin D deficiency - Continue supplementation 2000 units daily.   # Moderate non-proliferative diabetic retinopathy of both eyes - Also w/ hx of macular edema and chronic dry eyes.  Will need to follow up with Dr. Martin after discharge, missed last appointment due to hospitalization.      # JERONIMO on CKD - Cr elevated to 3.2 on hospital admission, improved to 1.65 as of 12/16.  Saw Dr. Rao 12/2 and please refer to his note for full details. After discharge trend BMP q Mon, Thur.  Avoid/minimize nephrotoxic agents and hypotension as able.  Encourage PO hydration. Follow up with Dr. Rao again in six months.      Consultations This Hospital Stay   SOCIAL WORK IP CONSULT  PHYSICAL THERAPY ADULT IP CONSULT  OCCUPATIONAL THERAPY ADULT IP CONSULT  NUTRITION SERVICES ADULT IP CONSULT  PHARMACY IP CONSULT  PATIENT LEARNING CENTER IP CONSULT    Code Status   Full Code    Time Spent on this Encounter   I, Bebeto Park PA-C, personally saw the patient today in anticipation for discharge tomorrow am and spent greater than 30 minutes discharging this patient.       Bebeto Park PA-C  [facility] ([facility])  ______________________________________________________________________    Physical Exam   Vital Signs: Temp: 96.7  F (35.9  C) Temp src: Oral BP: 125/59 Pulse: 93 Heart Rate: 89 Resp: 16 SpO2: 98 % O2 Device: None (Room air)    Weight: 165 lbs 14.4 oz  GEN: In NAD  HEENT: NCAT; PERRL; sclerae non-icteric  LUNGS: CTAB  CV: RRR  ABD: +BSs; SNTND; incision healing well with no e/o infxn or dehiscence.   EXT: No BLE edema  SKIN: No acute rashes noted on exposed areas.  NEURO: AAOx3; CNs grossly intact; No acute focal deficits noted.          Primary Care Physician   Maximo Tucker    Discharge Orders      Home care nursing referral      Home Care PT Referral for Hospital Discharge      Home Care OT Referral for Hospital Discharge      Reason for your hospital stay    Rehabilitation and nutritional support after hospitalization for treatment of acute kidney injury, confusion and dehydration.     Adult [facility] Follow-up and recommended labs and tests    Follow up with Dr. Ramos in 1-2 weeks    Follow up with transplant hepatology in 1 month.     Follow up with Oncology in 1-2 months.     Follow up with your family physician in 2-4 weeks.       Appointments on [place] and/or [facility] (with [facility] or [facility] provider or service). Call [phone] if you haven't heard regarding these appointments within 7 days of discharge.     Activity    Your activity upon discharge: activity as tolerated     Discharge Instructions    Please obtain routine CBC, BMP, hepatic panel, Mag, Phos, and tacrolimus level every Monday and Thursday.     Full Code     Diet    Follow this diet upon discharge: Orders Placed This Encounter      Snacks/Supplements Adult: Boost Glucose Control; With Meals      Room Service      High Kcal/High Protein Diet, ADULT       Significant Results and Procedures   CMP  Recent Labs   Lab 12/16/19  0625 12/12/19  0730    138   POTASSIUM 5.3 5.1   CHLORIDE 103 106   CO2 28 26   ANIONGAP 4 6   * 198*   BUN 43* 38*   CR 1.65* 1.49*   GFRESTIMATED 46* 52*   GFRESTBLACK 53* 60*   DEBORAH 9.2 8.9   MAG 1.7 1.7   PHOS 2.2* 2.8   PROTTOTAL 7.1 6.5*   ALBUMIN 2.9* 2.6*   BILITOTAL 0.4 0.4   ALKPHOS 220* 165*   AST 24 20   ALT 43 32     CBC  Recent Labs   Lab 12/16/19  0625 12/12/19  0730   WBC 4.2 4.9   RBC 2.43* 2.49*   HGB 7.5* 7.5*   HCT 22.9* 23.4*   MCV 94 94   MCH 30.9 30.1   MCHC 32.8 32.1   RDW 17.5* 17.3*   * 85*       Discharge Medications   Current Discharge Medication List      START taking these medications     Details   Acetaminophen (TYLENOL) 325 MG CAPS Take 325-650 mg by mouth every 4 hours as needed (pain, fever)  Qty: 100 capsule, Refills: 3    Associated Diagnoses: Liver transplant recipient (H)      azaTHIOprine (IMURAN) 50 MG tablet Take 3 tablets (150 mg) by mouth daily  Qty: 90 tablet, Refills: 3    Associated Diagnoses: Liver transplant recipient (H)      predniSONE (DELTASONE) 5 MG tablet Take 1 tablet (5 mg) by mouth daily  Qty: 30 tablet, Refills: 0    Associated Diagnoses: Liver transplant recipient (H)         CONTINUE these medications which have CHANGED    Details   alpha-lipoic acid 600 MG capsule Take 1 capsule (600 mg) by mouth daily  Qty: 90 capsule, Refills: 3    Associated Diagnoses: Type 2 diabetes mellitus with diabetic foot deformity (H)      Artificial Tear Solution (SM ARTIFICIAL TEARS) SOLN Place 1 drop into the right eye every hour as needed (dry eyes) Apply at least 4 times daily and as needed for dry eye  Qty: 1 Bottle, Refills: 3    Associated Diagnoses: Dry eyes      aspirin (ASA) 325 MG EC tablet Take 1 tablet (325 mg) by mouth daily  Qty: 30 tablet, Refills: 3    Associated Diagnoses: Liver transplant recipient (H)      carvedilol (COREG) 12.5 MG tablet Take 1 tablet (12.5 mg) by mouth 2 times daily (with meals)  Qty: 60 tablet, Refills: 0    Associated Diagnoses: Liver transplant recipient (H)      ferrous sulfate (FEROSUL) 325 (65 Fe) MG tablet Take 1 tablet (325 mg) by mouth 3 times daily (with meals)  Qty: 90 tablet, Refills: 3    Associated Diagnoses: Liver cirrhosis secondary to ESTRADA (H)      glucose (BD GLUCOSE) 4 g chewable tablet Take 1 tablet by mouth every hour as needed for low blood sugar  Qty: 60 tablet, Refills: 1    Associated Diagnoses: Type 2 diabetes mellitus with other specified complication, with long-term current use of insulin (H)      !! insulin aspart (NOVOLOG PEN) 100 UNIT/ML pen Give with meals and snacks  Do Not give Correction Insulin if Pre-Meal BG less  than 140.   For Pre-Meal  - 189 give 1 unit.   For Pre-Meal  - 239 give 2 units.   For Pre-Meal  - 289 give 3 units.   For Pre-Meal  - 339 give 4 units.   For Pre-Meal - 399 give 5 units.   For Pre-Meal -449 give 6 units  For Pre-Meal BG greater than or equal to 450 give 7 units.   To be given with prandial insulin, and based on pre-meal blood glucose.   If given at mealtime, administer within 30 minutes of start of meal  Qty: 3 mL, Refills: 1    Associated Diagnoses: Type 2 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, unspecified whether long term insulin use (H)      !! insulin aspart (NOVOLOG PEN) 100 UNIT/ML pen Dose = 1 unit per 10 grams of carbohydrate with meals and snacks, administer within 30 minutes of start of meal  Qty: 3 mL, Refills: 1    Associated Diagnoses: Type 2 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, unspecified whether long term insulin use (H)      insulin glargine (LANTUS PEN) 100 UNIT/ML pen Inject 14 Units Subcutaneous every morning  Qty: 3 mL, Refills: 3    Comments: If Lantus is not covered by insurance, may substitute Basaglar at same dose and frequency.    Associated Diagnoses: JERONIMO (acute kidney injury) (H)      loperamide (IMODIUM) 2 MG capsule Take 1 capsule (2 mg) by mouth 4 times daily as needed for diarrhea  Qty: 90 capsule, Refills: 1    Associated Diagnoses: Liver transplant recipient (H)      magnesium oxide (MAG-OX) 400 MG tablet Take 1 tablet (400 mg) by mouth 2 times daily  Qty: 60 tablet, Refills: 0    Associated Diagnoses: JERONIMO (acute kidney injury) (H)      mirtazapine (REMERON) 15 MG tablet Take 1 tablet (15 mg) by mouth At Bedtime  Qty: 30 tablet, Refills: 0    Associated Diagnoses: JERONIMO (acute kidney injury) (H)      omeprazole (PRILOSEC) 40 MG DR capsule Take 1 capsule (40 mg) by mouth daily  Qty: 90 capsule, Refills: 2    Associated  Diagnoses: Gastroesophageal reflux disease, esophagitis presence not specified      ondansetron (ZOFRAN) 4 MG tablet Take 1 tablet (4 mg) by mouth every 6 hours as needed for nausea or vomiting  Qty: 30 tablet, Refills: 3    Associated Diagnoses: JERONIMO (acute kidney injury) (H)      phosphorus tablet 250 mg (PHOSPHA 250 NEUTRAL) 250 MG per tablet Take 1 tablet (250 mg) by mouth 2 times daily  Qty: 60 tablet, Refills: 0    Associated Diagnoses: JERONIMO (acute kidney injury) (H)      rosuvastatin (CRESTOR) 5 MG tablet Take 1 tablet (5 mg) by mouth daily  Qty: 30 tablet, Refills: 3    Associated Diagnoses: Hyperlipidemia, unspecified hyperlipidemia type      sodium bicarbonate 650 MG tablet Take 1 tablet (650 mg) by mouth 2 times daily  Qty: 60 tablet, Refills: 0    Associated Diagnoses: JERONIMO (acute kidney injury) (H)      sulfamethoxazole-trimethoprim (BACTRIM/SEPTRA) 400-80 MG tablet Take 1 tablet by mouth daily  Qty: 30 tablet, Refills: 11    Associated Diagnoses: Liver transplant recipient (H)      tacrolimus (GENERIC EQUIVALENT) 0.5 MG capsule Take 11 capsules (5.5 mg) by mouth 2 times daily  Qty: 660 capsule, Refills: 0    Associated Diagnoses: Liver transplant recipient (H)      tamsulosin (FLOMAX) 0.4 MG capsule TAKE 1 CAPSULE(0.4 MG) BY MOUTH DAILY  Qty: 90 capsule, Refills: 3    Associated Diagnoses: Benign prostatic hyperplasia with lower urinary tract symptoms, symptom details unspecified      tenofovir (VIREAD) 300 MG tablet Take 1 tablet (300 mg) by mouth daily  Qty: 30 tablet, Refills: 11    Associated Diagnoses: Liver transplant recipient (H)      valGANciclovir (VALCYTE) 450 MG tablet Take 1 tablet (450 mg) by mouth daily  Qty: 30 tablet, Refills: 5    Associated Diagnoses: Liver transplant recipient (H)      vitamin B-12 (CYANOCOBALAMIN) 1000 MCG tablet Take 1 tablet (1,000 mcg) by mouth daily  Qty: 30 tablet, Refills: 0    Associated Diagnoses: Liver transplant recipient (H)      vitamin D3  (CHOLECALCIFEROL) 2000 units (50 mcg) tablet Take 1 tablet (2,000 Units) by mouth daily  Qty: 30 tablet, Refills: 0    Associated Diagnoses: Liver transplant recipient (H)       !! - Potential duplicate medications found. Please discuss with provider.      CONTINUE these medications which have NOT CHANGED    Details   BD VIKTORIA U/F 32G X 4 MM insulin pen needle Use 5 per day  Qty: 300 each, Refills: 3    Associated Diagnoses: Type 2 diabetes mellitus without complication, with long-term current use of insulin (H)      !! blood glucose (ACCU-CHEK EDINSON PLUS) test strip USE TO TEST FOUR TIMES DAILY OR AS DIRECTED  Qty: 400 strip, Refills: 6    Associated Diagnoses: Type II diabetes mellitus (H)      !! blood glucose monitoring (ACCU-CHEK EDINSON PLUS) test strip Use to test blood sugar 4 times daily  Qty: 400 each, Refills: 3    Associated Diagnoses: Type II diabetes mellitus (H)      blood glucose monitoring (ACCU-CHEK FASTCLIX) lancets Use to test blood sugar 4 times daily or as directed.  1 box = 102 lancets  Qty: 408 each, Refills: 3    Associated Diagnoses: Type II diabetes mellitus (H)      econazole nitrate 1 % external cream Apply topically daily To feet and toenails.  Qty: 85 g, Refills: 0    Associated Diagnoses: Tinea pedis of both feet      Lidocaine (LIDOCARE) 4 % Patch Place 1 patch onto the skin every 24 hours To prevent lidocaine toxicity, patient should be patch free for 12 hrs daily.    Associated Diagnoses: JERONIMO (acute kidney injury) (H)      Multiple Vitamin (THERAVITE PO) Take 1 tablet by mouth every morning       order for DME Equipment being ordered: Cane ()  Treatment Diagnosis: impaired gait  Qty: 1 each, Refills: 0    Associated Diagnoses: Liver transplant recipient (H)      prochlorperazine (COMPAZINE) 5 MG tablet Take 1 tablet (5 mg) by mouth every 6 hours as needed for nausea or vomiting (use after Zofran)    Associated Diagnoses: JERONIMO (acute kidney injury) (H)      simethicone (MYLICON) 80  MG chewable tablet Take 1 tablet (80 mg) by mouth every 6 hours as needed for cramping    Associated Diagnoses: JERONIMO (acute kidney injury) (H)      sodium chloride (OCEAN) 0.65 % nasal spray Spray 1 spray into both nostrils every hour as needed for congestion    Associated Diagnoses: JERONIMO (acute kidney injury) (H)       !! - Potential duplicate medications found. Please discuss with provider.      STOP taking these medications       insulin isophane human (HUMULIN N PEN) 100 UNIT/ML injection Comments:   Reason for Stopping:         mycophenolic acid (GENERIC EQUIVALENT) 180 MG EC tablet Comments:   Reason for Stopping:         Prenatal Vit-Fe Fumarate-FA (PRENATAL MULTIVITAMIN W/IRON) 27-0.8 MG tablet Comments:   Reason for Stopping:         tacrolimus (GENERIC EQUIVALENT) 1 MG capsule Comments:   Reason for Stopping:             Allergies   Allergies   Allergen Reactions     Codeine Other (See Comments)     Cannot take due to liver  Cannot tolerate oral narcotics     Seasonal Allergies      Sneezing, coughing, runny and itchy eyes

## 2019-12-17 NOTE — PROGRESS NOTES
Physical Therapy Discharge Summary    Reason for therapy discharge:    Discharged to home with home therapy.    Progress towards therapy goal(s). See goals on Care Plan in [facility] electronic health record for goal details.  Goals met    Therapy recommendation(s):    Continued therapy is recommended.  Rationale/Recommendations:  Home PT is warranted to aide in safe discharge home, aide in long term healing outcomes, and reduce risk of hospital readmission..

## 2019-12-17 NOTE — PLAN OF CARE
Pt was A&Ox4, VSS and reported abd pain that was relieved w/ saltine crackers and simethicone PRN. Pt is MOD I in room w/ cane and continent using toilet, had BM this shift. Pt has an approximated incision to abd that is free of bleeding/drainage or Sx of infection and is SUDHA. Pt has a patent SL PIV to L upper arm and a patent, clamped NG tube, tube feedings are suspended for tonight. Pt continues on Kcal count.

## 2019-12-17 NOTE — PLAN OF CARE
Alert and oriented X 4. Able to make needs known. Call light in reach. Offers no C/O pain/discomfort so far this shift. Mod I in room with cane. Continent of bowel and bladder. Last BM on 12/16. NG patent, clamped. 0200 BG was 276. Appears to be sleeping during rounds. Continue with plan of care.,

## 2019-12-17 NOTE — PLAN OF CARE
Occupational Therapy Discharge Summary    Reason for therapy discharge:    All goals and outcomes met, no further needs identified.    Progress towards therapy goal(s). See goals on Care Plan in Epic electronic health record for goal details.  Goals met    Therapy recommendation(s):    Continue home exercise program.Patient has et all goals - recommend home PT/OT safety eval, SN for med set up and monitor Patient has UE HEP that he can utilize for home at this time, No Ae/assistive device needs identified at this time - can get a shower chair from neighbor

## 2019-12-18 VITALS
BODY MASS INDEX: 24.04 KG/M2 | SYSTOLIC BLOOD PRESSURE: 129 MMHG | OXYGEN SATURATION: 99 % | HEIGHT: 69 IN | DIASTOLIC BLOOD PRESSURE: 72 MMHG | WEIGHT: 162.3 LBS | RESPIRATION RATE: 18 BRPM | HEART RATE: 95 BPM | TEMPERATURE: 97.5 F

## 2019-12-18 LAB
GLUCOSE BLDC GLUCOMTR-MCNC: 174 MG/DL (ref 70–99)
GLUCOSE BLDC GLUCOMTR-MCNC: 228 MG/DL (ref 70–99)
GLUCOSE BLDC GLUCOMTR-MCNC: 338 MG/DL (ref 70–99)

## 2019-12-18 PROCEDURE — 25000132 ZZH RX MED GY IP 250 OP 250 PS 637: Mod: GY | Performed by: HOSPITALIST

## 2019-12-18 PROCEDURE — 00000146 ZZHCL STATISTIC GLUCOSE BY METER IP

## 2019-12-18 PROCEDURE — 25000131 ZZH RX MED GY IP 250 OP 636 PS 637: Mod: GY | Performed by: NURSE PRACTITIONER

## 2019-12-18 PROCEDURE — 25000132 ZZH RX MED GY IP 250 OP 250 PS 637: Mod: GY | Performed by: NURSE PRACTITIONER

## 2019-12-18 RX ORDER — ONDANSETRON 4 MG/1
4 TABLET, FILM COATED ORAL EVERY 6 HOURS PRN
Qty: 30 TABLET | Refills: 3 | Status: SHIPPED | OUTPATIENT
Start: 2019-12-18 | End: 2020-02-11 | Stop reason: SINTOL

## 2019-12-18 RX ORDER — LOPERAMIDE HCL 2 MG
2 CAPSULE ORAL 4 TIMES DAILY PRN
Qty: 90 CAPSULE | Refills: 1 | Status: SHIPPED | OUTPATIENT
Start: 2019-12-18 | End: 2020-02-11

## 2019-12-18 RX ORDER — POLYVINYL ALCOHOL/POVIDONE 0.5%-0.6%
1 DROPS OPHTHALMIC (EYE)
Qty: 1 BOTTLE | Refills: 3 | Status: SHIPPED | OUTPATIENT
Start: 2019-12-18 | End: 2020-07-09

## 2019-12-18 RX ORDER — AZATHIOPRINE 50 MG/1
150 TABLET ORAL DAILY
Qty: 90 TABLET | Refills: 3 | Status: SHIPPED | OUTPATIENT
Start: 2019-12-18 | End: 2020-01-13

## 2019-12-18 RX ADMIN — MICONAZOLE NITRATE: 20 CREAM TOPICAL at 08:47

## 2019-12-18 RX ADMIN — PRENATAL VITAMINS-IRON FUMARATE 27 MG IRON-FOLIC ACID 0.8 MG TABLET 1 TABLET: at 08:37

## 2019-12-18 RX ADMIN — INSULIN ASPART 2 UNITS: 100 INJECTION, SOLUTION INTRAVENOUS; SUBCUTANEOUS at 08:40

## 2019-12-18 RX ADMIN — FERROUS SULFATE TAB 325 MG (65 MG ELEMENTAL FE) 325 MG: 325 (65 FE) TAB at 08:37

## 2019-12-18 RX ADMIN — TACROLIMUS 5.5 MG: 5 CAPSULE ORAL at 08:33

## 2019-12-18 RX ADMIN — CARVEDILOL 12.5 MG: 3.12 TABLET, FILM COATED ORAL at 08:38

## 2019-12-18 RX ADMIN — FERROUS SULFATE TAB 325 MG (65 MG ELEMENTAL FE) 325 MG: 325 (65 FE) TAB at 12:40

## 2019-12-18 RX ADMIN — AZATHIOPRINE 150 MG: 50 TABLET ORAL at 08:30

## 2019-12-18 RX ADMIN — INSULIN ASPART 4 UNITS: 100 INJECTION, SOLUTION INTRAVENOUS; SUBCUTANEOUS at 11:22

## 2019-12-18 RX ADMIN — INSULIN GLARGINE 14 UNITS: 100 INJECTION, SOLUTION SUBCUTANEOUS at 08:45

## 2019-12-18 RX ADMIN — TENOFOVIR DISOPROXIL FUMARATE 300 MG: 300 TABLET, COATED ORAL at 08:36

## 2019-12-18 RX ADMIN — VALGANCICLOVIR 450 MG: 450 TABLET, FILM COATED ORAL at 08:38

## 2019-12-18 RX ADMIN — PREDNISONE 5 MG: 5 TABLET ORAL at 08:33

## 2019-12-18 RX ADMIN — OMEPRAZOLE 40 MG: 20 CAPSULE, DELAYED RELEASE ORAL at 08:34

## 2019-12-18 RX ADMIN — MAGNESIUM OXIDE TAB 400 MG (241.3 MG ELEMENTAL MG) 400 MG: 400 (241.3 MG) TAB at 08:37

## 2019-12-18 RX ADMIN — SULFAMETHOXAZOLE AND TRIMETHOPRIM 1 TABLET: 400; 80 TABLET ORAL at 08:35

## 2019-12-18 RX ADMIN — DIBASIC SODIUM PHOSPHATE, MONOBASIC POTASSIUM PHOSPHATE AND MONOBASIC SODIUM PHOSPHATE 250 MG: 852; 155; 130 TABLET ORAL at 08:34

## 2019-12-18 RX ADMIN — ASPIRIN 325 MG: 325 TABLET, DELAYED RELEASE ORAL at 08:34

## 2019-12-18 RX ADMIN — SODIUM BICARBONATE 650 MG TABLET 650 MG: at 08:36

## 2019-12-18 ASSESSMENT — MIFFLIN-ST. JEOR: SCORE: 1561.57

## 2019-12-18 NOTE — PLAN OF CARE
Pt alert and oriented x 4, able to make needs known & conversant. Mod I in room. Pain comfortably manageable no PRN given/requesed. JJ removed/discontinued per order. PIV to CHRIST discontinued per protocol. Pt discharging home tomorrow around/at 1100. Continent of B & B. BG  219 & 177. VSS on RA call within reach.

## 2019-12-18 NOTE — PLAN OF CARE
Patient independent in room, plans for discharge today, no complained of pain and no respiratory distress noted. Call light is within reach, patient indep in room, continue plan of care.

## 2019-12-18 NOTE — PROGRESS NOTES
Met with patient to update the discharge planning discussion. He will go home today around 11, PCA to provide transport. [facility] to resume SN/PT/OT, with RN visit tomorrow for labs and med setup. [facility] referral cancelled, since no longer on tube feeding. PCA services to resume, SW made arrangements. Updated Transplant Coordinator RN on plan, she stated scheduling will call patient to set up follow up appointment with transplant team.

## 2019-12-18 NOTE — PLAN OF CARE
RN: Pt AA&O x3 w/ some confusion (repeating questions) anxious about going home. Discharge papers and meds reviewed extensively. PCA arrived; writer spoke to  Home Care RN who agreed to visit pt tonight to assist w/ harpreet meds. Unit phone number given to pt in case of further questions. Pt left unit at 1245.

## 2019-12-19 ENCOUNTER — MEDICAL CORRESPONDENCE (OUTPATIENT)
Dept: HEALTH INFORMATION MANAGEMENT | Facility: CLINIC | Age: 54
End: 2019-12-19

## 2019-12-19 ENCOUNTER — VIRTUAL VISIT (OUTPATIENT)
Dept: ENDOCRINOLOGY | Facility: CLINIC | Age: 54
End: 2019-12-19

## 2019-12-19 ENCOUNTER — TELEPHONE (OUTPATIENT)
Dept: ENDOCRINOLOGY | Facility: CLINIC | Age: 54
End: 2019-12-19

## 2019-12-19 ENCOUNTER — NURSE TRIAGE (OUTPATIENT)
Dept: NURSING | Facility: CLINIC | Age: 54
End: 2019-12-19

## 2019-12-19 DIAGNOSIS — E11.65 TYPE 2 DIABETES MELLITUS WITH HYPERGLYCEMIA, WITH LONG-TERM CURRENT USE OF INSULIN (H): Primary | ICD-10-CM

## 2019-12-19 DIAGNOSIS — Z79.4 TYPE 2 DIABETES MELLITUS WITH HYPERGLYCEMIA, WITH LONG-TERM CURRENT USE OF INSULIN (H): Primary | ICD-10-CM

## 2019-12-19 LAB
ALBUMIN SERPL-MCNC: 3.4 G/DL (ref 3.4–5)
ALP SERPL-CCNC: 180 U/L (ref 40–150)
ALT SERPL W P-5'-P-CCNC: 34 U/L (ref 0–70)
ANION GAP SERPL CALCULATED.3IONS-SCNC: 6 MMOL/L (ref 3–14)
AST SERPL W P-5'-P-CCNC: 15 U/L (ref 0–45)
BILIRUB DIRECT SERPL-MCNC: 0.1 MG/DL (ref 0–0.2)
BILIRUB SERPL-MCNC: 0.5 MG/DL (ref 0.2–1.3)
BUN SERPL-MCNC: 46 MG/DL (ref 7–30)
CALCIUM SERPL-MCNC: 9.4 MG/DL (ref 8.5–10.1)
CHLORIDE SERPL-SCNC: 101 MMOL/L (ref 94–109)
CO2 SERPL-SCNC: 26 MMOL/L (ref 20–32)
CREAT SERPL-MCNC: 1.87 MG/DL (ref 0.66–1.25)
ERYTHROCYTE [DISTWIDTH] IN BLOOD BY AUTOMATED COUNT: 18.1 % (ref 10–15)
GFR SERPL CREATININE-BSD FRML MDRD: 39 ML/MIN/{1.73_M2}
GLUCOSE SERPL-MCNC: 302 MG/DL (ref 70–99)
HCT VFR BLD AUTO: 24.7 % (ref 40–53)
HGB BLD-MCNC: 8.3 G/DL (ref 13.3–17.7)
MAGNESIUM SERPL-MCNC: 1.9 MG/DL (ref 1.6–2.3)
MCH RBC QN AUTO: 31.7 PG (ref 26.5–33)
MCHC RBC AUTO-ENTMCNC: 33.6 G/DL (ref 31.5–36.5)
MCV RBC AUTO: 94 FL (ref 78–100)
PHOSPHATE SERPL-MCNC: 3.8 MG/DL (ref 2.5–4.5)
PLATELET # BLD AUTO: 189 10E9/L (ref 150–450)
POTASSIUM SERPL-SCNC: 4.8 MMOL/L (ref 3.4–5.3)
PROT SERPL-MCNC: 7.6 G/DL (ref 6.8–8.8)
RBC # BLD AUTO: 2.62 10E12/L (ref 4.4–5.9)
SODIUM SERPL-SCNC: 133 MMOL/L (ref 133–144)
TACROLIMUS BLD-MCNC: 10.6 UG/L (ref 5–15)
TME LAST DOSE: NORMAL H
WBC # BLD AUTO: 3.4 10E9/L (ref 4–11)

## 2019-12-19 PROCEDURE — 83735 ASSAY OF MAGNESIUM: CPT | Performed by: SURGERY

## 2019-12-19 PROCEDURE — 80076 HEPATIC FUNCTION PANEL: CPT | Performed by: SURGERY

## 2019-12-19 PROCEDURE — 85027 COMPLETE CBC AUTOMATED: CPT | Performed by: SURGERY

## 2019-12-19 PROCEDURE — 84100 ASSAY OF PHOSPHORUS: CPT | Performed by: SURGERY

## 2019-12-19 PROCEDURE — 80197 ASSAY OF TACROLIMUS: CPT | Performed by: SURGERY

## 2019-12-19 PROCEDURE — 80048 BASIC METABOLIC PNL TOTAL CA: CPT | Performed by: SURGERY

## 2019-12-19 NOTE — PROGRESS NOTES
Received a call from clinic; Miller had called with concerns about his blood sugars.     He discharged home from [facility] yesterday.     While on ARU, he was followed by inpatient diabetes service; signed off on 12/13 as BG control was reasonable on regimen, and there were no further plans to adjust tube feeds.     He was getting cycled tube feeds, and was on NPH insulin to cover these tube feeds. He had been increasing his PO intake in order to have tube feeds discontinued at time of discharge, and they were.     He was resumed on Prednisone 5 mg daily on 12/16. He was discharged on regimen recommended by diabetes providers as of 12/13: Lantus 14 units daily, Aspart 1:10g CHO, and moderate intensity sliding scale AC and HS.(No increases in insulin were made to account for the steroids resuming, nor for persistent hyperglycemia in the setting of higher PO intake). In the 24 hours prior to discharge, BG ranging 170-338. He expressed BG concerns to primary team prior to his discahrge.    Miller is reporting BG as follows, since he discharged:  6PM on 12/18: 246  8:48PM on 12/18: 285  8:54 AM on 12/19: 326.     He endorses compliance with his insulin regimen, gave 14 of Lantus this morning, and gave 4 units for 39 grams of CHO with breakfast plus 4 of sliding scale for the morning BG (correct doses).     He is likely experiencing hyperglycemia in the setting of increasing PO intake and resumption in steroids.     Instructed Miller to increase to 20 units of Lantus qAM (he will give an additional 6 units now to make today's total dose to 20, which is a 30% increase in basal insulin). He will increase to 1 unit per 8 g CHO with meals, beginning with lunch. He will continue moderate intensity sliding scale.     I sent him an email of the above instructions, as well as the endocrine on call number that he will call if BG not improving on this regimen. I will put further instruction to increase Lantus to 24 units if fasting BG tomorrow  remains >200.  He may require further increase in mealtime insulin due to steroid effect, however with 30% increase in basal insulin as well today, went with a more conservative increase in bolus insulin of 20%.     His next diabetes follow up is 1/9; this may need to be moved up.    Lanie Atkinson PA-C  Diabetes Management Service  Pager [phone]

## 2019-12-19 NOTE — TELEPHONE ENCOUNTER
Spoke w/ Provider, states she will contact Pt with review and recommendations.   Abby Cespedes RN on 12/19/2019 at 10:35 AM

## 2019-12-19 NOTE — TELEPHONE ENCOUNTER
Pt received a liver transplant on 11/11/2019.    Pt was released on 11/20/2019.      Then he was sent home.   Then he went back to the hospital on 12/2/2019.    Then Pt was sent to a TCU for a couple of weeks.  Pt was discharged from the TCU yesterday 12/18/2019.     Since being in and out of the hospital and the TCU.     Pt has been having issues with his blood sugars.     Blood Sugars have been ranging in the 300 hundreds.   This morning Blood sugar was 326.   Pt took 14 Units of insulin and Blood Sugar went down to 214.     Pt has been taking 14 Units of insulin in the morning and then a sliding scale throughout the day.      But, Pt is not able to keep his blood sugars under control.      Pt is just a little thirsty.   But, no other symptoms at the present time.       Pt would like to know what he can do for now.   If he needs to be seen in the clinic.   I suggested an office visit for today.    But, Pt mentioned he needs to find a ride and needs a least 24 hours to get a ride lined up.       I transferred the call to the Endo Nurse in the clinic.    Plan of Care  Transferred the call to the Nurse in the Endo clinic for further assistance.    Marsha Chung RN  Central Triage Red Flags/Med Refills      Additional Information    Negative: Unconscious or difficult to awaken    Negative: Acting confused (e.g., disoriented, slurred speech)    Negative: Very weak (can't stand)    Negative: Sounds like a life-threatening emergency to the triager    Negative: Blood glucose > 500 mg/dl (27.5 mmol/l)    Negative: Blood glucose > 240 mg/dl (13 mmol/l) AND urine ketones moderate-large (or more than 1+)    Negative: Blood glucose > 240 mg/dl (13 mmol/l) and blood ketones > 1.5 mmol/l    Negative: Blood glucose > 240 mg/dl (13 mmol/l) AND vomiting AND unable to check for ketones (in blood or urine)    Negative: Vomiting lasting > 4 hours    Negative: Patient sounds very sick or weak to the triager    Negative: Vomiting and  signs of dehydration (e.g., very dry mouth, lightheaded, etc.)    Negative: Blood glucose > 240 mg/dl (13 mmol/l) and rapid breathing    Negative: Fever > 100.5 F (38.1 C)    Negative: Caller has URGENT medication or insulin pump question and triager unable to answer question    Negative: Blood glucose > 400 mg/dl (22 mmol/l)    Negative: Blood glucose > 300 mg/dl (16.7 mmol/l) AND two or more times in a row    Negative: Urine ketones moderate - large (or blood ketones > 1.5 mmol/l)    Negative: New-onset diabetes suspected (e.g., frequent urination, weak, weight loss)    Negative: Symptoms of high blood sugar (e.g., frequent urination, weak, weight loss) and not able to test blood glucose    Patient wants to be seen    Protocols used: DIABETES - HIGH BLOOD SUGAR-A-OH

## 2019-12-19 NOTE — TELEPHONE ENCOUNTER
Spoke w/ PT:   Pt was in hospital and now that he is out BS results as high as 300, Pt states: little thirsty, other than that just fine.   Pt states: Nov 11 liver transplant; released Nov 20; monitor BS 4x daily, back in hospital for 10 days Dec 2nd, went to Transitional Care until Dec 18, now at home alone, was on tube feeding/ intermediate insulin.   Lantus 14u in the morning  Sliding Scale 3x daily w/ meals  Also, coverage 1u per 10G CHO  Combination of Lantus and novolog  Prior to that he had been on Tresiba up to 71 unit(s) daily, adjusted 3 or 4 times.   Lives in [place], does not drive, cannot come in to clinic.  Needs new Plan.   Most recent BS was 326 @0900AM  285 at HOS last night  Back from [facility] was 246 yesterday evening.   Prefers to phone to [place] as he is having problems with MyChart, ok to leave message on VM.   Sent to provider for review. Provider paged.  Abby Cespedes RN on 12/19/2019 at 10:29 AM

## 2019-12-23 ENCOUNTER — MEDICAL CORRESPONDENCE (OUTPATIENT)
Dept: HEALTH INFORMATION MANAGEMENT | Facility: CLINIC | Age: 54
End: 2019-12-23

## 2019-12-23 ENCOUNTER — TELEPHONE (OUTPATIENT)
Dept: ENDOCRINOLOGY | Facility: CLINIC | Age: 54
End: 2019-12-23

## 2019-12-23 ENCOUNTER — TELEPHONE (OUTPATIENT)
Dept: TRANSPLANT | Facility: CLINIC | Age: 54
End: 2019-12-23

## 2019-12-23 DIAGNOSIS — N17.9 AKI (ACUTE KIDNEY INJURY) (H): ICD-10-CM

## 2019-12-23 DIAGNOSIS — E11.3293 TYPE 2 DIABETES MELLITUS WITH MILD NONPROLIFERATIVE RETINOPATHY OF BOTH EYES WITHOUT MACULAR EDEMA, UNSPECIFIED WHETHER LONG TERM INSULIN USE (H): ICD-10-CM

## 2019-12-23 LAB
ALBUMIN SERPL-MCNC: 4 G/DL (ref 3.4–5)
ALP SERPL-CCNC: 165 U/L (ref 40–150)
ALT SERPL W P-5'-P-CCNC: 28 U/L (ref 0–70)
ANION GAP SERPL CALCULATED.3IONS-SCNC: 6 MMOL/L (ref 3–14)
AST SERPL W P-5'-P-CCNC: 16 U/L (ref 0–45)
BILIRUB DIRECT SERPL-MCNC: 0.1 MG/DL (ref 0–0.2)
BILIRUB SERPL-MCNC: 0.4 MG/DL (ref 0.2–1.3)
BUN SERPL-MCNC: 50 MG/DL (ref 7–30)
CALCIUM SERPL-MCNC: 9.6 MG/DL (ref 8.5–10.1)
CHLORIDE SERPL-SCNC: 107 MMOL/L (ref 94–109)
CO2 SERPL-SCNC: 25 MMOL/L (ref 20–32)
CREAT SERPL-MCNC: 2.09 MG/DL (ref 0.66–1.25)
ERYTHROCYTE [DISTWIDTH] IN BLOOD BY AUTOMATED COUNT: 19.1 % (ref 10–15)
GFR SERPL CREATININE-BSD FRML MDRD: 34 ML/MIN/{1.73_M2}
GLUCOSE SERPL-MCNC: 233 MG/DL (ref 70–99)
HCT VFR BLD AUTO: 25.9 % (ref 40–53)
HGB BLD-MCNC: 8.4 G/DL (ref 13.3–17.7)
MAGNESIUM SERPL-MCNC: 2.1 MG/DL (ref 1.6–2.3)
MCH RBC QN AUTO: 31.3 PG (ref 26.5–33)
MCHC RBC AUTO-ENTMCNC: 32.4 G/DL (ref 31.5–36.5)
MCV RBC AUTO: 97 FL (ref 78–100)
PHOSPHATE SERPL-MCNC: 3.8 MG/DL (ref 2.5–4.5)
PLATELET # BLD AUTO: 235 10E9/L (ref 150–450)
POTASSIUM SERPL-SCNC: 5.2 MMOL/L (ref 3.4–5.3)
PROT SERPL-MCNC: 7.9 G/DL (ref 6.8–8.8)
RBC # BLD AUTO: 2.68 10E12/L (ref 4.4–5.9)
SODIUM SERPL-SCNC: 138 MMOL/L (ref 133–144)
TACROLIMUS BLD-MCNC: 11.5 UG/L (ref 5–15)
TME LAST DOSE: NORMAL H
WBC # BLD AUTO: 3.4 10E9/L (ref 4–11)

## 2019-12-23 PROCEDURE — 80076 HEPATIC FUNCTION PANEL: CPT | Performed by: SURGERY

## 2019-12-23 PROCEDURE — 84100 ASSAY OF PHOSPHORUS: CPT | Performed by: SURGERY

## 2019-12-23 PROCEDURE — 83735 ASSAY OF MAGNESIUM: CPT | Performed by: SURGERY

## 2019-12-23 PROCEDURE — 85027 COMPLETE CBC AUTOMATED: CPT | Performed by: SURGERY

## 2019-12-23 PROCEDURE — 80048 BASIC METABOLIC PNL TOTAL CA: CPT | Performed by: SURGERY

## 2019-12-23 PROCEDURE — 80197 ASSAY OF TACROLIMUS: CPT | Performed by: SURGERY

## 2019-12-23 NOTE — TELEPHONE ENCOUNTER
[facility] Call Center    Phone Message    May a detailed message be left on voicemail: yes    Reason for Call: Medication Question or concern regarding medication   Prescription Clarification  Name of Medication: insulin glargine (LANTUS PEN) 100 UNIT/ML pen - 20 units if under 200 blood sugar, and 24 units if greater than 200 blood sugar  Prescribing Provider: Dr Wilcox   Pharmacy:[facility] -  mail order specialty pharmacy   What on the order needs clarification? Needs New Rx sent to New Pharmacy - old Rx was lower dosing and old pharmacy was at hospital - wants to use mail order pharmacy and needs correct higher dosing    Call [facility], Ph # [phone] if any questions - Thanks      Action Taken: Message routed to:  [facility] ([facility]): Endocrine

## 2019-12-23 NOTE — TELEPHONE ENCOUNTER
New order, previous order signed by Dr Park. Per clinic notes 12/19/2019.  Abby Cespedes RN on 12/23/2019 at 3:26 PM

## 2019-12-24 ENCOUNTER — ALLIED HEALTH/NURSE VISIT (OUTPATIENT)
Dept: PHARMACY | Facility: CLINIC | Age: 54
End: 2019-12-24
Payer: COMMERCIAL

## 2019-12-24 ENCOUNTER — TELEPHONE (OUTPATIENT)
Dept: TRANSPLANT | Facility: CLINIC | Age: 54
End: 2019-12-24

## 2019-12-24 DIAGNOSIS — E11.3293 TYPE 2 DIABETES MELLITUS WITH MILD NONPROLIFERATIVE RETINOPATHY OF BOTH EYES WITHOUT MACULAR EDEMA, UNSPECIFIED WHETHER LONG TERM INSULIN USE (H): ICD-10-CM

## 2019-12-24 DIAGNOSIS — Z94.4 LIVER TRANSPLANT RECIPIENT (H): ICD-10-CM

## 2019-12-24 DIAGNOSIS — I10 BENIGN ESSENTIAL HYPERTENSION: ICD-10-CM

## 2019-12-24 DIAGNOSIS — G62.9 NEUROPATHY: ICD-10-CM

## 2019-12-24 DIAGNOSIS — E78.2 MIXED HYPERLIPIDEMIA: ICD-10-CM

## 2019-12-24 DIAGNOSIS — Z94.4 STATUS POST LIVER TRANSPLANTATION (H): Primary | ICD-10-CM

## 2019-12-24 PROCEDURE — 99606 MTMS BY PHARM EST 15 MIN: CPT | Mod: GY | Performed by: PHARMACIST

## 2019-12-24 PROCEDURE — 99607 MTMS BY PHARM ADDL 15 MIN: CPT | Mod: GY | Performed by: PHARMACIST

## 2019-12-24 RX ORDER — TACROLIMUS 1 MG/1
5 CAPSULE ORAL 2 TIMES DAILY
Qty: 300 CAPSULE | Refills: 3 | Status: SHIPPED | OUTPATIENT
Start: 2019-12-24 | End: 2020-02-24

## 2019-12-24 NOTE — TELEPHONE ENCOUNTER
Spoke with patient and he says he is feeling a lot better than last night. He is unsure what caused him to get anxious.

## 2019-12-24 NOTE — PATIENT INSTRUCTIONS
Recommendations from today's MTM visit:                                                      1. [facility] mail order will call you three weeks post discharge. If you are running low on any medications before then, call the number on the back of the pill box. ([phone])    2.     It was great to speak with you today.  I value your experience and would be very thankful for your time with providing feedback on our clinic survey. You may receive a survey via email or text message in the next few days.     Next MTM visit: 1/13    To schedule another MTM appointment, please call the clinic directly or you may call the MTM scheduling line at [phone] or toll-free at [phone].     My Clinical Pharmacist's contact information:                                                      It was a pleasure talking with you today!  Please feel free to contact me with any questions or concerns you have.      Donald Bradley, PharmD  [facility] Pharmacist    Phone: [phone]

## 2019-12-24 NOTE — TELEPHONE ENCOUNTER
"8:19 PM  December 23, 2019  Returned Miller's call.  Very concerned re BP of 160/80.  Spoke with his caregiver, states he is extremely anxious, maybe even having a panic attack.  \"Restless, thinks he is going to die.\"  Offered ER assessment tonight or discuss with coordinator in AM.  Will probably wait until AM.  Katie Miller, RN, BA  On Call Organ Coordinator    "

## 2019-12-24 NOTE — TELEPHONE ENCOUNTER
Pt currently taking 5.5 mg every 12 hours. Patient current level 11. Patient will decrease to 5 mg every 12 hours.     Pt wrote down dose change and repeated dose change.  Pt knows to take out the small 0.5 mg capsule and feels he is able to do so.

## 2019-12-24 NOTE — PROGRESS NOTES
SUBJECTIVE/OBJECTIVE:                Frandy Workman is a 54 year old male coming in for a follow up MTM visit.  He was referred post txp.     Chief Complaint: Follow up post hospitalization, discharged on 12/18/19. Still struggling with sugars. Appetite has been improving, eating more.  Requests another med review today.     Allergies/ADRs: Reviewed in Epic  Tobacco:  reports that he has never smoked. He quit smokeless tobacco use about 2 years ago.  His smokeless tobacco use included chew.  Alcohol: not currently using  Caffeine: 0-1 cups/day of coffee  PMH: Reviewed in Epic    Medication Adherence/Access:  Patient uses pill box(es). Patient has homecare coming twice weekly to help with meds.   Patient takes medications 3 time(s) per day. 8, 6, 8  Per patient, misses medication 0 times per week.   Medication barriers: none.   The patient fills medications at [place]: YES.    Liver Transplant:  Current immunosuppressants include TAC 5mg BID (goal dose 8-9), Azathioprine 150mg daily, Prednisone 5mg daily.  Pt reports no side effects, no diarrhea.  Other meds: Aspirin 325mg daily  Transplant date: 11/11/19  Estimated Creatinine Clearance: 41.6 mL/min (A) (based on SCr of 2.09 mg/dL (H)).  CMV prophylaxis: CrCl 40 to 59 mL/minute: Valcyte 450 mg once daily Donor (+), Recipient (-), treat 6 months post tx   PCP prophylaxis: Bactrim S S daily for 6 months post txp  PPI use: Omeprazole 40mg daily. Having some burping, once daily after meal.   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   Immunizations: annual flu shot 2019; [id number]:  2015; Prevnar 13: 2019; TDaP:  2015; Shingrix:  unknown    Diabetes:  Pt currently taking Lantus 24 units daily and Novolog 1 unit per 8 g of CHO with sliding scale. Pt is not experiencing side effects. Followed by Endocrine.   SMB-3 times daily.   Ranges (patient reported):   Date FBG/ 2hours post Lunch/2hours post Dinner /2hours post Bedtime    226       259 326 170 226    202 200 139 168    332 (cookies) 195 142 144    (increased to Lantus 24 units)    133   No lows recently.   Recent symptoms of high blood sugar? none  Eye exam: up to date  Foot exam: up to date  ACEi/ARB: No.   Urine Albumin:         Lab Results   Component Value Date     UMALCR 9.21 2019      Aspirin: Taking 325mg daily and denies side effects  Diet/Exercise: he is still struggling with appetite but is doing his best to follow endo's directions.     Neuropathy: Pt is taking Alpha Lipoic acid 600mg once daily. Toes are the most painful. He is unsure if he is getting benefit from this.     Hypertension: Current medications include Carvedilol 12.5mg BID.  Patient does self-monitor BP. 126/76, 137/72, 110/65, 132/75, 125/67, 128/72, 101/60, 131/66, 168/84, 139/73.  Patient reports no current medication side effects.    Hyperlipidemia: Current therapy includes Rosuvastatin 5mg once daily.  Pt reports no significant myalgias or other side effects.  The 10-year ASCVD risk score (Gilbert ROSS Jr., et al., 2013) is: 13.5%    Values used to calculate the score:      Age: 54 years      Sex: Male      Is Non- : No      Diabetic: Yes      Tobacco smoker: No      Systolic Blood Pressure: 129 mmHg      Is BP treated: Yes      HDL Cholesterol: 26 mg/dL      Total Cholesterol: 131 mg/dL    Today's Vitals: There were no vitals taken for this visit.    ASSESSMENT:                 Medication Adherence: good, no issues identified.    Liver Transplant:  Phos has been >2 for the entire month, supplementation likely not necessary. Will discuss with txp team.  CO2 has been normal as well, but potassium on the high end. Reasonable to continue Bicarb.     Diabetes:  BG Still >200 in the AM, recommend increase Lantus to 28 units daily. Will discuss with endocrine.     Neuropathy: Stable.     Hypertension: Stable.     Hyperlipidemia: Stable.    PLAN:                LILLIAN Mari, consider...  1. Fasting BG still >200, Lantus increased to 24 units 4-5 days ago. Recommend increasing to 28 units now.     Txp team consider...  1. Holding Phosphorus, likely an unnecessary med, phos levels have been >2 for ~3 weeks, and >3 for the last two values.     I spent 35 minutes with this patient today. I offer these suggestions for consideration by txp team. A copy of the visit note was provided to the patient's referring provider.    Will follow up 2 weeks.    The patient was given a summary of these recommendations as an after visit summary.    Donald Bradley, PharmD  [facility] Pharmacist    Phone: [phone]

## 2019-12-26 ENCOUNTER — TELEPHONE (OUTPATIENT)
Dept: ENDOCRINOLOGY | Facility: CLINIC | Age: 54
End: 2019-12-26

## 2019-12-26 ENCOUNTER — TELEPHONE (OUTPATIENT)
Dept: TRANSPLANT | Facility: CLINIC | Age: 54
End: 2019-12-26

## 2019-12-26 DIAGNOSIS — Z94.4 LIVER TRANSPLANT RECIPIENT (H): Primary | ICD-10-CM

## 2019-12-26 LAB
ALBUMIN SERPL-MCNC: 3.8 G/DL (ref 3.4–5)
ALP SERPL-CCNC: 137 U/L (ref 40–150)
ALT SERPL W P-5'-P-CCNC: 23 U/L (ref 0–70)
ANION GAP SERPL CALCULATED.3IONS-SCNC: 5 MMOL/L (ref 3–14)
AST SERPL W P-5'-P-CCNC: 19 U/L (ref 0–45)
BILIRUB DIRECT SERPL-MCNC: <0.1 MG/DL (ref 0–0.2)
BILIRUB SERPL-MCNC: 0.4 MG/DL (ref 0.2–1.3)
BUN SERPL-MCNC: 46 MG/DL (ref 7–30)
CALCIUM SERPL-MCNC: 9.3 MG/DL (ref 8.5–10.1)
CHLORIDE SERPL-SCNC: 108 MMOL/L (ref 94–109)
CO2 SERPL-SCNC: 26 MMOL/L (ref 20–32)
CREAT SERPL-MCNC: 2.05 MG/DL (ref 0.66–1.25)
ERYTHROCYTE [DISTWIDTH] IN BLOOD BY AUTOMATED COUNT: 19.6 % (ref 10–15)
GFR SERPL CREATININE-BSD FRML MDRD: 35 ML/MIN/{1.73_M2}
GLUCOSE SERPL-MCNC: 191 MG/DL (ref 70–99)
HCT VFR BLD AUTO: 23.5 % (ref 40–53)
HGB BLD-MCNC: 7.6 G/DL (ref 13.3–17.7)
MAGNESIUM SERPL-MCNC: 1.9 MG/DL (ref 1.6–2.3)
MCH RBC QN AUTO: 32.3 PG (ref 26.5–33)
MCHC RBC AUTO-ENTMCNC: 32.3 G/DL (ref 31.5–36.5)
MCV RBC AUTO: 100 FL (ref 78–100)
PHOSPHATE SERPL-MCNC: 3.3 MG/DL (ref 2.5–4.5)
PLATELET # BLD AUTO: 188 10E9/L (ref 150–450)
POTASSIUM SERPL-SCNC: 4.8 MMOL/L (ref 3.4–5.3)
PROT SERPL-MCNC: 7.6 G/DL (ref 6.8–8.8)
RBC # BLD AUTO: 2.35 10E12/L (ref 4.4–5.9)
SODIUM SERPL-SCNC: 139 MMOL/L (ref 133–144)
WBC # BLD AUTO: 2.9 10E9/L (ref 4–11)

## 2019-12-26 PROCEDURE — 80076 HEPATIC FUNCTION PANEL: CPT | Performed by: SURGERY

## 2019-12-26 PROCEDURE — 80197 ASSAY OF TACROLIMUS: CPT | Performed by: SURGERY

## 2019-12-26 PROCEDURE — 84100 ASSAY OF PHOSPHORUS: CPT | Performed by: SURGERY

## 2019-12-26 PROCEDURE — 85027 COMPLETE CBC AUTOMATED: CPT | Performed by: SURGERY

## 2019-12-26 PROCEDURE — 83735 ASSAY OF MAGNESIUM: CPT | Performed by: SURGERY

## 2019-12-26 PROCEDURE — 80048 BASIC METABOLIC PNL TOTAL CA: CPT | Performed by: SURGERY

## 2019-12-26 NOTE — TELEPHONE ENCOUNTER
----- Message from Radha Ndiaye RN sent at 12/26/2019  2:30 PM CST -----  Regarding: follow up appt  Hey,    I just got off the phone with Miller.     Can you assist in getting him a new follow up with a diabetes doctor. He said his regular endocrine doc is no longer in the clinic and he missed his appt Dec 18th because he was admitted.     Thank you a jessi Garcia

## 2019-12-26 NOTE — PROGRESS NOTES
Radha Ndiaye, Donald Sauceda, [facility]             We are seeing him in clinic on Monday :)    Previous Messages      ----- Message -----   From: Donald Bradley [facility]   Sent: 12/26/2019  12:24 PM CST   To: CHAVEZ Dobbins,     Patient's phosphorus has been looking good for the past 3 weeks or so. Recommend having him finish off his phos, then stop. Could you run it past whoever his current txp MD is? Thank you,     Donald Bradley, PharmD   [facility] Pharmacist     Phone: [phone]

## 2019-12-26 NOTE — TELEPHONE ENCOUNTER
Provider Call: Transplant Provider  Route to RN  Facility Name[facility] # [phone]  Discuss about Oxy that the patient stated he had extra bottle of Oxycodone.   [facility] cannot find it and is filling out a vulnerable adult form      Callback needed? Yes    Return Call Needed  Same as documented in contacts section  When to return call?: Same day: Route High Priority

## 2019-12-26 NOTE — TELEPHONE ENCOUNTER
Orders for lantus; dextrose;novolog; [facility] to  [facility] . Copy labeled to scan.   Abby Cespedes RN on 12/26/2019 at 11:18 AM

## 2019-12-26 NOTE — TELEPHONE ENCOUNTER
PLEASE HELP SCHEDULE THE FOLLOWING APPT(S): Return Appointment    TIME FRAME NEEDED BY: First available.       SCHEDULING COMMENTS (optional): with new provider, previously Pt of Dr Wilcox.   Abby Cespedes RN on 12/26/2019 at 2:54 PM

## 2019-12-26 NOTE — TELEPHONE ENCOUNTER
PLEASE HELP SCHEDULE THE FOLLOWING APPT(S): Return Appointment    TIME FRAME NEEDED BY: First available.       SCHEDULING COMMENTS (optional): needs appt with new provider, previously Dr Jeri Alas.   Abby Cespedes RN on 12/26/2019 at 2:53 PM

## 2019-12-26 NOTE — TELEPHONE ENCOUNTER
"Spoke with patient. He admits that he is battling issues with anxiety and he seems to be having some depression since post transplant. He has been staying on top of his medications, but is getting very overwhelmed. He does report \"not having a great support system, really makes it harder and I guess it's my fault as I didn't realize I didn't have a big support system.\" patient encouraged to attend liver support group on Thursdays.     Discussed with Dr Chino. Pt to start zoloft 50 mg daily. And discussed with pharmacist, norberto marion, regarding remeron wean. Patient can be on both remeron and zoloft, but will wean off remeron. Remeron 1 every other day for a week then 1 every 3 day for a week then discontinue. Pt is aware, but he does not have a vehicle to  locally. Patient will  on Monday and will write down wean process for him. Pt agreeable or he will get to anxious.   "

## 2019-12-26 NOTE — PROGRESS NOTES
Message   Received: Today   Message Contents   Lanie Atkinson PA-C [place], Peter Alberto [facility]             Thank you for seeing him. I agree with the increase in Lantus to 28 units daily, and I would also recommend increasing his insulin to carb ratio further from 1:8g to 1:6 g CHO.     Looks like he has diabetes follow up on 1/9 with Dr. Carlos Dela Cruz    Previous Messages      ----- Message -----   From: Donald Bradley [facility]   Sent: 12/26/2019  12:26 PM CST   To: CLAUDIA Wolff,     I saw your patient for a post discharge medication review on 12/24. His fasting BG were still running >200 at that time. I recommend increasing lantus to 28 units daily. Last dose increase was last week.     Let me know if you approve and I will contact patient.     Donald Bradley PharmD   [facility] Pharmacist     Phone: [phone]           Date FBG/ 2hours post Lunch/2hours post Dinner /2hours post    12/26 (Lantus 20 U) 190 196/123     12/25 (lantus 20 U) 156 233 178/130    12/24(Lantus 24 U) 226 95 106 108     Talked with patient 12/26. He has been been taking Lantus 20 units due to confusion surrounding dose (he thought if BG was <200 he was supposed to decreased to 20 units). I will have him increase back to 24 units of Lantus, and continue current Novolog 1 unit per 8 g carbs as he had some fairly low values this week. I will possibly follow through with the increase next Tuesday when I talk with him.     Donald Bradley PharmD  [facility] Pharmacist    Phone: [phone]

## 2019-12-27 ENCOUNTER — TELEPHONE (OUTPATIENT)
Dept: TRANSPLANT | Facility: CLINIC | Age: 54
End: 2019-12-27

## 2019-12-27 ENCOUNTER — MEDICAL CORRESPONDENCE (OUTPATIENT)
Dept: HEALTH INFORMATION MANAGEMENT | Facility: CLINIC | Age: 54
End: 2019-12-27

## 2019-12-27 LAB
TACROLIMUS BLD-MCNC: 8.4 UG/L (ref 5–15)
TME LAST DOSE: 2000 H

## 2019-12-27 NOTE — TELEPHONE ENCOUNTER
Patient Call: General  Route to LPN    Reason for call: Please connect with pt regarding some follow up appt with certain care pcp     Call back needed? Yes    Return Call Needed  Same as documented in contacts section  When to return call?: Greater than one day: Route standard priority

## 2019-12-30 ENCOUNTER — OFFICE VISIT (OUTPATIENT)
Dept: TRANSPLANT | Facility: CLINIC | Age: 54
End: 2019-12-30
Attending: TRANSPLANT SURGERY
Payer: MEDICARE

## 2019-12-30 ENCOUNTER — TELEPHONE (OUTPATIENT)
Dept: TRANSPLANT | Facility: CLINIC | Age: 54
End: 2019-12-30

## 2019-12-30 ENCOUNTER — MEDICAL CORRESPONDENCE (OUTPATIENT)
Dept: HEALTH INFORMATION MANAGEMENT | Facility: CLINIC | Age: 54
End: 2019-12-30

## 2019-12-30 ENCOUNTER — ANCILLARY PROCEDURE (OUTPATIENT)
Dept: GENERAL RADIOLOGY | Facility: CLINIC | Age: 54
End: 2019-12-30
Attending: INTERNAL MEDICINE
Payer: MEDICARE

## 2019-12-30 ENCOUNTER — TELEPHONE (OUTPATIENT)
Dept: ENDOCRINOLOGY | Facility: CLINIC | Age: 54
End: 2019-12-30

## 2019-12-30 VITALS
TEMPERATURE: 97.5 F | OXYGEN SATURATION: 99 % | DIASTOLIC BLOOD PRESSURE: 67 MMHG | WEIGHT: 167.4 LBS | SYSTOLIC BLOOD PRESSURE: 137 MMHG | BODY MASS INDEX: 24.72 KG/M2 | HEART RATE: 96 BPM

## 2019-12-30 DIAGNOSIS — N17.9 AKI (ACUTE KIDNEY INJURY) (H): ICD-10-CM

## 2019-12-30 DIAGNOSIS — Z94.4 LIVER TRANSPLANT RECIPIENT (H): ICD-10-CM

## 2019-12-30 DIAGNOSIS — D84.9 IMMUNOSUPPRESSION (H): Primary | ICD-10-CM

## 2019-12-30 DIAGNOSIS — E11.3293 TYPE 2 DIABETES MELLITUS WITH MILD NONPROLIFERATIVE RETINOPATHY OF BOTH EYES WITHOUT MACULAR EDEMA, UNSPECIFIED WHETHER LONG TERM INSULIN USE (H): Primary | ICD-10-CM

## 2019-12-30 LAB
ALBUMIN SERPL-MCNC: 4.1 G/DL (ref 3.4–5)
ALP SERPL-CCNC: 127 U/L (ref 40–150)
ALT SERPL W P-5'-P-CCNC: 24 U/L (ref 0–70)
ANION GAP SERPL CALCULATED.3IONS-SCNC: 6 MMOL/L (ref 3–14)
AST SERPL W P-5'-P-CCNC: 26 U/L (ref 0–45)
BILIRUB DIRECT SERPL-MCNC: <0.1 MG/DL (ref 0–0.2)
BILIRUB SERPL-MCNC: 0.6 MG/DL (ref 0.2–1.3)
BUN SERPL-MCNC: 52 MG/DL (ref 7–30)
CALCIUM SERPL-MCNC: 9.3 MG/DL (ref 8.5–10.1)
CHLORIDE SERPL-SCNC: 108 MMOL/L (ref 94–109)
CO2 SERPL-SCNC: 23 MMOL/L (ref 20–32)
CREAT SERPL-MCNC: 1.93 MG/DL (ref 0.66–1.25)
ERYTHROCYTE [DISTWIDTH] IN BLOOD BY AUTOMATED COUNT: 20.3 % (ref 10–15)
GFR SERPL CREATININE-BSD FRML MDRD: 38 ML/MIN/{1.73_M2}
GLUCOSE SERPL-MCNC: 201 MG/DL (ref 70–99)
HCT VFR BLD AUTO: 25 % (ref 40–53)
HGB BLD-MCNC: 8.1 G/DL (ref 13.3–17.7)
MAGNESIUM SERPL-MCNC: 2 MG/DL (ref 1.6–2.3)
MCH RBC QN AUTO: 31.6 PG (ref 26.5–33)
MCHC RBC AUTO-ENTMCNC: 32.4 G/DL (ref 31.5–36.5)
MCV RBC AUTO: 98 FL (ref 78–100)
PHOSPHATE SERPL-MCNC: 3.5 MG/DL (ref 2.5–4.5)
PLATELET # BLD AUTO: 132 10E9/L (ref 150–450)
POTASSIUM SERPL-SCNC: 5 MMOL/L (ref 3.4–5.3)
PROT SERPL-MCNC: 8 G/DL (ref 6.8–8.8)
RBC # BLD AUTO: 2.56 10E12/L (ref 4.4–5.9)
SODIUM SERPL-SCNC: 137 MMOL/L (ref 133–144)
TACROLIMUS BLD-MCNC: 8.2 UG/L (ref 5–15)
TME LAST DOSE: NORMAL H
WBC # BLD AUTO: 5.2 10E9/L (ref 4–11)

## 2019-12-30 PROCEDURE — 80076 HEPATIC FUNCTION PANEL: CPT | Performed by: SURGERY

## 2019-12-30 PROCEDURE — 80048 BASIC METABOLIC PNL TOTAL CA: CPT | Performed by: SURGERY

## 2019-12-30 PROCEDURE — 83735 ASSAY OF MAGNESIUM: CPT | Performed by: SURGERY

## 2019-12-30 PROCEDURE — 80197 ASSAY OF TACROLIMUS: CPT | Performed by: SURGERY

## 2019-12-30 PROCEDURE — 84100 ASSAY OF PHOSPHORUS: CPT | Performed by: SURGERY

## 2019-12-30 PROCEDURE — G0463 HOSPITAL OUTPT CLINIC VISIT: HCPCS | Mod: ZF

## 2019-12-30 PROCEDURE — 85027 COMPLETE CBC AUTOMATED: CPT | Performed by: SURGERY

## 2019-12-30 RX ORDER — MAGNESIUM OXIDE 400 MG/1
400 TABLET ORAL 2 TIMES DAILY
Qty: 60 TABLET | Refills: 11 | Status: SHIPPED | OUTPATIENT
Start: 2019-12-30 | End: 2020-01-14

## 2019-12-30 RX ORDER — MIRTAZAPINE 15 MG/1
15 TABLET, FILM COATED ORAL EVERY OTHER DAY
Qty: 7 TABLET | Refills: 0 | Status: SHIPPED | OUTPATIENT
Start: 2019-12-30 | End: 2020-01-13

## 2019-12-30 ASSESSMENT — PAIN SCALES - GENERAL: PAINLEVEL: MODERATE PAIN (5)

## 2019-12-30 NOTE — LETTER
12/30/2019    RE: Frandy Workman  [address]  [place] [address]     Transplant Surgery -OUTPATIENT IMMUNOSUPPRESSION PROGRESS NOTE    Date of Visit: 12/30/2019    Transplants:  11/11/2019 (Liver); Postoperative day:  49  ASSESMENT AND PLAN:  1.Graft Function:Liver allograft: no rejection or technical problems.    2.Immunosuppression Management: keep tacrolimus levels at 5-6 ng/dL  3.Hypertension:ok  4.Renal Function: elevated creatinine, would recommend to see a nephrologist  5.Lab frequency:  6.Other:  Has back pain taking tylenol with releif    Date: December 30, 2019    Transplant:  [x]                             Liver [x]                              Kidney []                             Pancreas []                              Other:             Chief Complaint:  Doing well, has depression, would recommend  Change from remeron to Zoloft  History of Present Illness:  Post liver transplant    Patient Active Problem List   Diagnosis     Hepatic cirrhosis (H)     Type II diabetes mellitus (H)     Bipolar affective disorder in remission (H)     Esophageal varices (H)     Nonalcoholic steatohepatitis (ESTRADA)     Brow ptosis     Paralytic lagophthalmos of right upper eyelid     Erectile dysfunction due to diseases classified elsewhere     HCC (hepatocellular carcinoma) (H)     Equivocal stress echocardiogram     Type 2 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, unspecified whether long term insulin use (H)     Abnormal findings diagnostic imaging of heart and coronary circulation     Status post coronary angiogram     CAD (coronary artery disease)     Portal vein thrombosis     Liver transplant recipient (H)     Status post liver transplantation (H)     Immunosuppressed status (H)     Anemia due to blood loss, acute     Thrombocytopenia (H)     HTN (hypertension)     Diarrhea     Delirium     Malnutrition related to chronic disease (H)     Hypernatremia      Hypophosphatasia     JERONIMO (acute kidney injury) (H)     CKD (chronic kidney disease) stage 2, GFR 60-89 ml/min     Malnutrition (H)     Gastropathy     Anemia     SOCIAL /FAMILY HISTORY: [x]                  No recent change    Past Medical History:   Diagnosis Date     Anemia 2013     Arthritis      BPH (benign prostatic hyperplasia)      CAD (coronary artery disease) 4/1/2019     Cholelithiasis      Conductive hearing loss 08/16/2017     Depressive disorder 1986    Suffer effects throughout life     Gastroesophageal reflux disease 12/01/2014     HCC (hepatocellular carcinoma) (H) 1/22/2019     History of diabetic retinopathy 07/2018     HTN (hypertension)      HTN (hypertension) 11/20/2019     Hyperlipidemia      Liver cirrhosis secondary to ESTRADA (H)      Liver transplanted (H) 11/11/2019     Portal vein thrombosis 4/11/2019     Type II diabetes mellitus (H)      Past Surgical History:   Procedure Laterality Date     COLONOSCOPY      2015     COLONOSCOPY N/A 12/6/2019    Procedure: COLONOSCOPY, WITH POLYPECTOMY AND BIOPSY;  Surgeon: Adam Morton MD;  Location:  GI     CV HEART CATHETERIZATION WITH POSSIBLE INTERVENTION N/A 2/26/2019    Procedure: CORS;  Surgeon: Jagdish Hoyt MD;  Location:  HEART CARDIAC CATH LAB     ESOPHAGOSCOPY, GASTROSCOPY, DUODENOSCOPY (EGD), COMBINED N/A 11/17/2016    Procedure: COMBINED ESOPHAGOSCOPY, GASTROSCOPY, DUODENOSCOPY (EGD);  Surgeon: Santi Rosas MD;  Location:  GI     ESOPHAGOSCOPY, GASTROSCOPY, DUODENOSCOPY (EGD), COMBINED N/A 11/17/2017    Procedure: COMBINED ESOPHAGOSCOPY, GASTROSCOPY, DUODENOSCOPY (EGD);  EGD;  Surgeon: Santi Rosas MD;  Location:  GI     ESOPHAGOSCOPY, GASTROSCOPY, DUODENOSCOPY (EGD), COMBINED N/A 12/28/2018    Procedure: EGD;  Surgeon: Santi Rosas MD;  Location:  OR     ESOPHAGOSCOPY, GASTROSCOPY, DUODENOSCOPY (EGD), COMBINED N/A 12/6/2019    Procedure: ESOPHAGOGASTRODUODENOSCOPY, WITH BIOPSY;   Surgeon: Adam Morton MD;  Location: [facility] GI     HEAD & NECK SURGERY      2017 at [facility].      IMPLANT GOLD WEIGHT EYELID Right 2017    Procedure: IMPLANT WEIGHT EYELID;  Right Upper Eyelid Weight, right tarsal strip lower eyelid;  Surgeon: Milana Malave MD;  Location: [facility]     IR CHEMO EMBOLIZATION  2019     KNEE SURGERY Left      ORTHOPEDIC SURGERY       PAROTIDECTOMY, RADICAL NECK DISSECTION Right 2017    Procedure: PAROTIDECTOMY, RADICAL NECK DISSECTION;  Right Superfacial Parotidectomy , Facial nerve repair. with [facility] facial nerve monitor.;  Surgeon: Asiya Morgan MD;  Location: [facility] OR     PICC INSERTION Left 2017    4fr SL BioFlo PICC, 44cm in the L basilic vein w/ tip in the low SVC     RETURN LIVER TRANSPLANT N/A 2019    Procedure: Exploratory laparotomy, hematoma evacuation, abdominal washout;  Surgeon: Александр Ramos MD;  Location: [facility] OR     TRANSPLANT LIVER RECIPIENT  DONOR N/A 2019    Procedure: TRANSPLANT, LIVER, RECIPIENT,  DONOR;  Surgeon: Александр Ramos MD;  Location: [facility] OR     VASCULAR SURGERY       Social History     Socioeconomic History     Marital status:      Spouse name: Not on file     Number of children: Not on file     Years of education: Not on file     Highest education level: Not on file   Occupational History     Not on file   Social Needs     Financial resource strain: Not on file     Food insecurity:     Worry: Not on file     Inability: Not on file     Transportation needs:     Medical: Not on file     Non-medical: Not on file   Tobacco Use     Smoking status: Never Smoker     Smokeless tobacco: Former User     Types: Chew     Tobacco comment: 1 tin per week   Substance and Sexual Activity     Alcohol use: No     Alcohol/week: 0.0 standard drinks     Comment: quit 1996     Drug use: No     Sexual activity: Not Currently     Partners: Female     Birth control/protection: Condom   Lifestyle     Physical  activity:     Days per week: Not on file     Minutes per session: Not on file     Stress: Not on file   Relationships     Social connections:     Talks on phone: Not on file     Gets together: Not on file     Attends Yarsani service: Not on file     Active member of club or organization: Not on file     Attends meetings of clubs or organizations: Not on file     Relationship status: Not on file     Intimate partner violence:     Fear of current or ex partner: Not on file     Emotionally abused: Not on file     Physically abused: Not on file     Forced sexual activity: Not on file   Other Topics Concern     Parent/sibling w/ CABG, MI or angioplasty before 65F 55M? Yes   Social History Narrative     Not on file     Prescription Medications as of 12/30/2019       Rx Number Disp Refills Start End Last Dispensed Date Next Fill Date Owning Pharmacy    Acetaminophen (TYLENOL) 325 MG CAPS  100 capsule 3 12/18/2019    [place] [address]    Sig: Take 325-650 mg by mouth every 4 hours as needed (pain, fever)    Class: E-Prescribe    Route: Oral    alpha-lipoic acid 600 MG capsule  90 capsule 3 12/17/2019    [place] [address]    Sig: Take 1 capsule (600 mg) by mouth daily    Class: E-Prescribe    Route: Oral    Artificial Tear Solution (SM ARTIFICIAL TEARS) SOLN  1 Bottle 3 12/18/2019    [place] [address]    Sig: Place 1 drop into the right eye every hour as needed (dry eyes) Apply at least 4 times daily and as needed for dry eye    Class: E-Prescribe    Route: Right Eye    aspirin (ASA) 325 MG EC tablet  30 tablet 3 12/17/2019    [place] [address]    Sig: Take 1 tablet (325 mg) by mouth daily    Class: E-Prescribe    Route: Oral    azaTHIOprine (IMURAN) 50 MG tablet  90 tablet 3 12/18/2019    [place] [address]    Sig: Take 3 tablets (150 mg) by mouth daily    Class: E-Prescribe    Route: Oral    BD VIKTORIA U/F 32G X 4 MM insulin pen needle  300 each 3 4/11/2018    [id number] - [place], [place] - [address]    Sig: Use 5 per day    Class: E-Prescribe    Renewals     Renewal provider:  Natalie Chun MD          blood glucose (ACCU-CHEK EDINSON PLUS) test strip  400 strip 6 11/22/2019    [id number] - [place], [place] - [address] [facility]    Sig: USE TO TEST FOUR TIMES DAILY OR AS DIRECTED    Class: E-Prescribe    blood glucose monitoring (ACCU-CHEK EDINSON PLUS) test strip  400 each 3 10/13/2017    [id number] - [place], [place] - [address]    Sig: Use to test blood sugar 4 times daily    Class: E-Prescribe    blood glucose monitoring (ACCU-CHEK FASTCLIX) lancets  408 each 3 10/13/2017    [id number] - [place], [place] - [address]    Sig: Use to test blood sugar 4 times daily or as directed.  1 box = 102 lancets    Class: E-Prescribe    carvedilol (COREG) 12.5 MG tablet  60 tablet 0 12/17/2019    [facility] [address]    Sig: Take 1 tablet (12.5 mg) by mouth 2 times daily (with meals)    Class: E-Prescribe    Route: Oral    econazole nitrate 1 % external cream  85 g 0 7/31/2019    [id number] - [place], [place] - [address] [facility]    Sig: Apply topically daily To feet and toenails.    Class: E-Prescribe    Route: Topical    ferrous sulfate (FEROSUL) 325 (65 Fe) MG tablet  90 tablet 3 12/17/2019    [facility] [address]    Sig: Take 1 tablet (325 mg) by mouth 3 times daily (with meals)    Class: E-Prescribe    Route: Oral    glucose (BD GLUCOSE) 4 g chewable tablet  60 tablet 1 12/18/2019    [facility]  [place] [address]    Sig: Take 1 tablet by mouth every hour as needed for low blood sugar    Class: E-Prescribe    Route: Oral    insulin aspart (NOVOLOG PEN) 100 UNIT/ML pen  3 mL 1 12/23/2019    [place] [address]    Sig: Dose = 1 unit per 8 grams of carbohydrate with meals and snacks, administer within 30 minutes of start of meal    Class: E-Prescribe    insulin aspart (NOVOLOG PEN) 100 UNIT/ML pen  3 mL 1 12/17/2019    [place] [address]    Sig: Give with meals and snacks  Do Not give Correction Insulin if Pre-Meal BG less than 140.   For Pre-Meal  - 189 give 1 unit.   For Pre-Meal  - 239 give 2 units.   For Pre-Meal  - 289 give 3 units.   For Pre-Meal  - 339 give 4 units.   For Pre-Meal - 399 give 5 units.   For Pre-Meal -449 give 6 units  For Pre-Meal BG greater than or equal to 450 give 7 units.   To be given with prandial insulin, and based on pre-meal blood glucose.   If given at mealtime, administer within 30 minutes of start of meal    Class: Local Print    insulin glargine (LANTUS PEN) 100 UNIT/ML pen  3 mL 3 12/23/2019    [place] [address]    Sig: Inject 20 Units Subcutaneous every morning    Class: E-Prescribe    Notes to Pharmacy: If Lantus is not covered by insurance, may substitute Basaglar at same dose and frequency.      Route: Subcutaneous    Lidocaine (LIDOCARE) 4 % Patch    12/10/2019        Sig: Place 1 patch onto the skin every 24 hours To prevent lidocaine toxicity, patient should be patch free for 12 hrs daily.    Class: Transitional    Route: Transdermal    loperamide (IMODIUM) 2 MG capsule  90 capsule 1 12/18/2019    [place] [address]    Sig: Take 1 capsule (2 mg) by mouth 4 times daily as needed for diarrhea    Class: E-Prescribe    Route: Oral     magnesium oxide (MAG-OX) 400 MG tablet  60 tablet 0 12/17/2019    [facility] [address]    Sig: Take 1 tablet (400 mg) by mouth 2 times daily    Class: E-Prescribe    Route: Oral    mirtazapine (REMERON) 15 MG tablet  30 tablet 0 12/17/2019    [facility] [address]    Sig: Take 1 tablet (15 mg) by mouth At Bedtime    Class: E-Prescribe    Route: Oral    Multiple Vitamin (THERAVITE PO)    3/14/2016        Sig: Take 1 tablet by mouth every morning     Class: Historical    Route: Oral    omeprazole (PRILOSEC) 40 MG DR capsule  90 capsule 2 12/17/2019    [place] [address]    Sig: Take 1 capsule (40 mg) by mouth daily    Class: E-Prescribe    Route: Oral    Renewals     Renewal requests to authorizing provider ([place] PA-C) <b>prohibited</b>    Renewal provider:  Natalie Chun MD          ondansetron (ZOFRAN) 4 MG tablet  30 tablet 3 12/18/2019    [facility] [address]    Sig: Take 1 tablet (4 mg) by mouth every 6 hours as needed for nausea or vomiting    Class: E-Prescribe    Route: Oral    order for DME  1 each 0 11/19/2019        Sig: Equipment being ordered: Cane ()  Treatment Diagnosis: impaired gait    Class: Local Print    phosphorus tablet 250 mg (PHOSPHA 250 NEUTRAL) 250 MG per tablet  60 tablet 0 12/17/2019    [place] [address]    Sig: Take 1 tablet (250 mg) by mouth 2 times daily    Class: E-Prescribe    Route: Oral    predniSONE (DELTASONE) 5 MG tablet  30 tablet 0 12/18/2019    [place] [address]    Sig: Take 1 tablet (5 mg) by mouth daily    Class: E-Prescribe    Route: Oral    prochlorperazine (COMPAZINE) 5 MG tablet    12/10/2019        Sig: Take 1 tablet (5 mg) by mouth every 6 hours as needed for nausea or vomiting  (use after Zofran)    Class: Transitional    Route: Oral    rosuvastatin (CRESTOR) 5 MG tablet  30 tablet 3 12/17/2019    [facility] [address]    Sig: Take 1 tablet (5 mg) by mouth daily    Class: E-Prescribe    Route: Oral    sertraline (ZOLOFT) 50 MG tablet  30 tablet 1 12/26/2019    [id number]    Sig: Take 1 tablet (50 mg) by mouth daily    Class: E-Prescribe    Route: Oral    simethicone (MYLICON) 80 MG chewable tablet    12/10/2019        Sig: Take 1 tablet (80 mg) by mouth every 6 hours as needed for cramping    Class: Transitional    Route: Oral    sodium bicarbonate 650 MG tablet  60 tablet 0 12/17/2019    [facility] [address]    Sig: Take 1 tablet (650 mg) by mouth 2 times daily    Class: E-Prescribe    Route: Oral    sodium chloride (OCEAN) 0.65 % nasal spray    12/10/2019        Sig: Spray 1 spray into both nostrils every hour as needed for congestion    Class: Transitional    Route: Both Nostrils    sulfamethoxazole-trimethoprim (BACTRIM/SEPTRA) 400-80 MG tablet  30 tablet 11 12/17/2019    [facility] [address]    Sig: Take 1 tablet by mouth daily    Class: E-Prescribe    Route: Oral    tacrolimus (GENERIC EQUIVALENT) 1 MG capsule  300 capsule 3 12/24/2019    [facility] [address]    Sig: Take 5 capsules (5 mg) by mouth 2 times daily    Class: E-Prescribe    Notes to Pharmacy: TXP DT 11/11/2019 (Liver) TXP Dischg DT 11/20/2019 DX Liver replaced by transplant Z94.4 TX Center [facility] ([place], [place])    Route: Oral    Prior authorization:  Closed - Other    tamsulosin (FLOMAX) 0.4 MG capsule  90 capsule 3 12/17/2019    [facility] [address]    Sig: TAKE 1 CAPSULE(0.4 MG) BY MOUTH  DAILY    Class: E-Prescribe    Renewals     Renewal requests to authorizing provider ([place] PA-C) <b>prohibited</b>    Renewal provider:  Natalie Chun MD          tenofovir (VIREAD) 300 MG tablet  30 tablet 11 12/17/2019    [facility] [address]    Sig: Take 1 tablet (300 mg) by mouth daily    Class: E-Prescribe    Route: Oral    valGANciclovir (VALCYTE) 450 MG tablet  30 tablet 5 12/17/2019    [place] [id number]    Sig: Take 1 tablet (450 mg) by mouth daily    Class: E-Prescribe    Route: Oral    vitamin B-12 (CYANOCOBALAMIN) 1000 MCG tablet  30 tablet 0 12/17/2019    [place] [address]    Sig: Take 1 tablet (1,000 mcg) by mouth daily    Class: E-Prescribe    Route: Oral    vitamin D3 (CHOLECALCIFEROL) 2000 units (50 mcg) tablet  30 tablet 0 12/17/2019    [facility] [address]    Sig: Take 1 tablet (2,000 Units) by mouth daily    Class: E-Prescribe    Route: Oral      Clinic-Administered Medications as of 12/30/2019       Dose Frequency Start End    Aflibercept (EYLEA) injection 2 mg 2 mg EVERY 28 DAYS 3/28/2019 2/27/2020    Admin Instructions: As needed 3-52 weeksRE    Route: Intravitreal    ranibizumab (LUCENTIS) injection syringe 0.5 mg 0.5 mg EVERY 28 DAYS 3/28/2019 2/27/2020    Admin Instructions: As needed 3-52 weekdRE    Route: Intravitreal        Codeine and Seasonal allergies   REVIEW OF SYSTEMS (check box if normal)  [x]               GENERAL  [x]                 PULMONARY [x]                GENITOURINARY  [x]                CNS                 [x]                 CARDIAC  [x]                 ENDOCRINE  [x]                EARS,NOSE,THROAT [x]                 GASTROINTESTINAL [x]                 NEUROLOGIC    [x]                MUSCLOSKELTAL  [x]                  HEMATOLOGY      PHYSICAL EXAM (check box if  normal)/67 (BP Location: Right arm, Patient Position: Sitting, Cuff Size: Adult Regular)   Pulse 96   Temp 97.5  F (36.4  C) (Oral)   Wt 75.9 kg (167 lb 6.4 oz)   SpO2 99%   BMI 24.72 kg/m           [x]            GENERAL:    [x]       EYES:  ICTERIC   []        YES  []                    NO  [x]           EXTREMITIES: Clubbing []       Y     [x]           N    [x]           EARS, NOSE, THROAT: Membranes Moist    YES   [x]                   NO []                  [x]           LUNGS:  CLEAR    YES       [x]                  NO    []                                [x]           SKIN: Jaundice           YES       []                  NO    [x]                   Rash: YES       []                  NO    [x]                                     [x]             HEART: Regular Rate          YES       [x]                  NO    []                   Incision Clean:  YES       [x]                  NO    []                                [x]                    ABDOMEN: Organomegaly YES       []                  NO    [x]                       [x]                    NEUROLOGICAL:  Nonfocal  YES       [x]                  NO    []                       [x]                    Hernia YES       []                  NO    [x]                   PSYCHIATRIC:  Appropriate  YES       [x]                  NO    []                       OTHER:                                                                                                   PAIN SCALE:: 3    Yonathan Chino MD

## 2019-12-30 NOTE — NURSING NOTE
Chief Complaint   Patient presents with     RECHECK     Liver post op     Blood pressure 137/67, pulse 96, temperature 97.5  F (36.4  C), temperature source Oral, weight 75.9 kg (167 lb 6.4 oz), SpO2 99 %.    Yvonne Moore on 12/30/2019 at 10:48 AM

## 2019-12-30 NOTE — TELEPHONE ENCOUNTER
[facility] Call Center    Phone Message    May a detailed message be left on voicemail: yes    Reason for Call: Medication Question or concern regarding medication   Prescription Clarification  Name of Medication: insulin glargine (LANTUS PEN) 100 UNIT/ML pen  Prescribing Provider: Lanie Atkinson   Pharmacy: [place], [place] - [address]   What on the order needs clarification? Miller calling to get clarification regarding his prescription.  He states he is supposed to receive 28 units of insulin but the recent Rx is only for 20 units.  Please call him back to discuss          Action Taken: Message routed to:  [facility] ([facility] ZAK Burden

## 2019-12-30 NOTE — TELEPHONE ENCOUNTER
Miller requested to speak with a nurse I did inform him per his last visit on 12/19 dose was increased to 20 units of Lantus not 28 units.

## 2019-12-30 NOTE — PROGRESS NOTES
HPI      ROS      Physical Exam    Transplant Surgery -OUTPATIENT IMMUNOSUPPRESSION PROGRESS NOTE    Date of Visit: 12/30/2019    Transplants:  11/11/2019 (Liver); Postoperative day:  49  ASSESMENT AND PLAN:  1.Graft Function:Liver allograft: no rejection or technical problems.    2.Immunosuppression Management: keep tacrolimus levels at 5-6 ng/dL  3.Hypertension:ok  4.Renal Function: elevated creatinine, would recommend to see a nephrologist  5.Lab frequency:  6.Other:  Has back pain taking tylenol with releif    Date: December 30, 2019    Transplant:  [x]                             Liver [x]                              Kidney []                             Pancreas []                              Other:             Chief Complaint:  Doing well, has depression, would recommend  Change from remeron to Zoloft  History of Present Illness:  Post liver transplant    Patient Active Problem List   Diagnosis     Hepatic cirrhosis (H)     Type II diabetes mellitus (H)     Bipolar affective disorder in remission (H)     Esophageal varices (H)     Nonalcoholic steatohepatitis (ESTRADA)     Brow ptosis     Paralytic lagophthalmos of right upper eyelid     Erectile dysfunction due to diseases classified elsewhere     HCC (hepatocellular carcinoma) (H)     Equivocal stress echocardiogram     Type 2 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, unspecified whether long term insulin use (H)     Abnormal findings diagnostic imaging of heart and coronary circulation     Status post coronary angiogram     CAD (coronary artery disease)     Portal vein thrombosis     Liver transplant recipient (H)     Status post liver transplantation (H)     Immunosuppressed status (H)     Anemia due to blood loss, acute     Thrombocytopenia (H)     HTN (hypertension)     Diarrhea     Delirium     Malnutrition related to chronic disease (H)     Hypernatremia     Hypophosphatasia     JERONIMO (acute kidney  injury) (H)     CKD (chronic kidney disease) stage 2, GFR 60-89 ml/min     Malnutrition (H)     Gastropathy     Anemia     SOCIAL /FAMILY HISTORY: [x]                  No recent change    Past Medical History:   Diagnosis Date     Anemia 2013     Arthritis      BPH (benign prostatic hyperplasia)      CAD (coronary artery disease) 4/1/2019     Cholelithiasis      Conductive hearing loss 08/16/2017     Depressive disorder 1986    Suffer effects throughout life     Gastroesophageal reflux disease 12/01/2014     HCC (hepatocellular carcinoma) (H) 1/22/2019     History of diabetic retinopathy 07/2018     HTN (hypertension)      HTN (hypertension) 11/20/2019     Hyperlipidemia      Liver cirrhosis secondary to ESTRADA (H)      Liver transplanted (H) 11/11/2019     Portal vein thrombosis 4/11/2019     Type II diabetes mellitus (H)      Past Surgical History:   Procedure Laterality Date     COLONOSCOPY      2015     COLONOSCOPY N/A 12/6/2019    Procedure: COLONOSCOPY, WITH POLYPECTOMY AND BIOPSY;  Surgeon: Adam Morton MD;  Location:  GI     CV HEART CATHETERIZATION WITH POSSIBLE INTERVENTION N/A 2/26/2019    Procedure: CORS;  Surgeon: Jagdish Hoyt MD;  Location:  HEART CARDIAC CATH LAB     ESOPHAGOSCOPY, GASTROSCOPY, DUODENOSCOPY (EGD), COMBINED N/A 11/17/2016    Procedure: COMBINED ESOPHAGOSCOPY, GASTROSCOPY, DUODENOSCOPY (EGD);  Surgeon: Santi Rosas MD;  Location:  GI     ESOPHAGOSCOPY, GASTROSCOPY, DUODENOSCOPY (EGD), COMBINED N/A 11/17/2017    Procedure: COMBINED ESOPHAGOSCOPY, GASTROSCOPY, DUODENOSCOPY (EGD);  EGD;  Surgeon: Santi Rosas MD;  Location:  GI     ESOPHAGOSCOPY, GASTROSCOPY, DUODENOSCOPY (EGD), COMBINED N/A 12/28/2018    Procedure: EGD;  Surgeon: Santi Rosas MD;  [facility]     ESOPHAGOSCOPY, GASTROSCOPY, DUODENOSCOPY (EGD), COMBINED N/A 12/6/2019    Procedure: ESOPHAGOGASTRODUODENOSCOPY, WITH BIOPSY;  Surgeon: Adam Morton MD;  Location:   GI     HEAD & NECK SURGERY      2017 at [facility].      IMPLANT GOLD WEIGHT EYELID Right 2017    Procedure: IMPLANT WEIGHT EYELID;  Right Upper Eyelid Weight, right tarsal strip lower eyelid;  Surgeon: Milana Malave MD;  Location: [facility]     IR CHEMO EMBOLIZATION  2019     KNEE SURGERY Left      ORTHOPEDIC SURGERY       PAROTIDECTOMY, RADICAL NECK DISSECTION Right 2017    Procedure: PAROTIDECTOMY, RADICAL NECK DISSECTION;  Right Superfacial Parotidectomy , Facial nerve repair. with [facility] facial nerve monitor.;  Surgeon: Asiya Morgan MD;  Location: [facility] OR     PICC INSERTION Left 2017    4fr SL BioFlo PICC, 44cm in the L basilic vein w/ tip in the low SVC     RETURN LIVER TRANSPLANT N/A 2019    Procedure: Exploratory laparotomy, hematoma evacuation, abdominal washout;  Surgeon: Александр Ramos MD;  Location: [facility] OR     TRANSPLANT LIVER RECIPIENT  DONOR N/A 2019    Procedure: TRANSPLANT, LIVER, RECIPIENT,  DONOR;  Surgeon: Александр Ramos MD;  Location: [facility] OR     VASCULAR SURGERY       Social History     Socioeconomic History     Marital status:      Spouse name: Not on file     Number of children: Not on file     Years of education: Not on file     Highest education level: Not on file   Occupational History     Not on file   Social Needs     Financial resource strain: Not on file     Food insecurity:     Worry: Not on file     Inability: Not on file     Transportation needs:     Medical: Not on file     Non-medical: Not on file   Tobacco Use     Smoking status: Never Smoker     Smokeless tobacco: Former User     Types: Chew     Tobacco comment: 1 tin per week   Substance and Sexual Activity     Alcohol use: No     Alcohol/week: 0.0 standard drinks     Comment: quit 1996     Drug use: No     Sexual activity: Not Currently     Partners: Female     Birth control/protection: Condom   Lifestyle     Physical activity:     Days per week: Not on file      Minutes per session: Not on file     Stress: Not on file   Relationships     Social connections:     Talks on phone: Not on file     Gets together: Not on file     Attends [employer] service: Not on file     Active member of club or organization: Not on file     Attends meetings of clubs or organizations: Not on file     Relationship status: Not on file     Intimate partner violence:     Fear of current or ex partner: Not on file     Emotionally abused: Not on file     Physically abused: Not on file     Forced sexual activity: Not on file   Other Topics Concern     Parent/sibling w/ CABG, MI or angioplasty before 65F 55M? Yes   Social History Narrative     Not on file     Prescription Medications as of 12/30/2019       Rx Number Disp Refills Start End Last Dispensed Date Next Fill Date Owning Pharmacy    Acetaminophen (TYLENOL) 325 MG CAPS  100 capsule 3 12/18/2019    [facility] [address]    Sig: Take 325-650 mg by mouth every 4 hours as needed (pain, fever)    Class: E-Prescribe    Route: Oral    alpha-lipoic acid 600 MG capsule  90 capsule 3 12/17/2019    [facility] [address]    Sig: Take 1 capsule (600 mg) by mouth daily    Class: E-Prescribe    Route: Oral    Artificial Tear Solution (SM ARTIFICIAL TEARS) SOLN  1 Bottle 3 12/18/2019    [place] [address]    Sig: Place 1 drop into the right eye every hour as needed (dry eyes) Apply at least 4 times daily and as needed for dry eye    Class: E-Prescribe    Route: Right Eye    aspirin (ASA) 325 MG EC tablet  30 tablet 3 12/17/2019    [facility] [address]    Sig: Take 1 tablet (325 mg) by mouth daily    Class: E-Prescribe    Route: Oral    azaTHIOprine (IMURAN) 50 MG tablet  90 tablet 3 12/18/2019    [facility] [address]    Sig: Take 3 tablets (150 mg) by  mouth daily    Class: E-Prescribe    Route: Oral    BD VIKTORIA U/F 32G X 4 MM insulin pen needle  300 each 3 4/11/2018    [id number] - [place], [place] - [address]    Sig: Use 5 per day    Class: E-Prescribe    Renewals     Renewal provider:  Natalie Chun MD          blood glucose (ACCU-CHEK EDINSON PLUS) test strip  400 strip 6 11/22/2019    [id number] - [place] [id number] [facility]    Sig: USE TO TEST FOUR TIMES DAILY OR AS DIRECTED    Class: E-Prescribe    blood glucose monitoring (ACCU-CHEK EDINSON PLUS) test strip  400 each 3 10/13/2017    [id number] - [place], [place] - [address]    Sig: Use to test blood sugar 4 times daily    Class: E-Prescribe    blood glucose monitoring (ACCU-CHEK FASTCLIX) lancets  408 each 3 10/13/2017    [id number] - [place], [place] - [address]    Sig: Use to test blood sugar 4 times daily or as directed.  1 box = 102 lancets    Class: E-Prescribe    carvedilol (COREG) 12.5 MG tablet  60 tablet 0 12/17/2019    [facility] [address]    Sig: Take 1 tablet (12.5 mg) by mouth 2 times daily (with meals)    Class: E-Prescribe    Route: Oral    econazole nitrate 1 % external cream  85 g 0 7/31/2019    [id number] - [place], [place] - [address] [facility]    Sig: Apply topically daily To feet and toenails.    Class: E-Prescribe    Route: Topical    ferrous sulfate (FEROSUL) 325 (65 Fe) MG tablet  90 tablet 3 12/17/2019    [facility] [address]    Sig: Take 1 tablet (325 mg) by mouth 3 times daily (with meals)    Class: E-Prescribe    Route: Oral    glucose (BD GLUCOSE) 4 g chewable tablet  60 tablet 1 12/18/2019    [facility] [place], [place] - [address]     Sig: Take 1 tablet by mouth every hour as needed for low blood sugar    Class: E-Prescribe    Route: Oral    insulin aspart (NOVOLOG PEN) 100 UNIT/ML pen  3 mL 1 12/23/2019    [facility] [address]    Sig: Dose = 1 unit per 8 grams of carbohydrate with meals and snacks, administer within 30 minutes of start of meal    Class: E-Prescribe    insulin aspart (NOVOLOG PEN) 100 UNIT/ML pen  3 mL 1 12/17/2019    [place] [address]    Sig: Give with meals and snacks  Do Not give Correction Insulin if Pre-Meal BG less than 140.   For Pre-Meal  - 189 give 1 unit.   For Pre-Meal  - 239 give 2 units.   For Pre-Meal  - 289 give 3 units.   For Pre-Meal  - 339 give 4 units.   For Pre-Meal - 399 give 5 units.   For Pre-Meal -449 give 6 units  For Pre-Meal BG greater than or equal to 450 give 7 units.   To be given with prandial insulin, and based on pre-meal blood glucose.   If given at mealtime, administer within 30 minutes of start of meal    Class: Local Print    insulin glargine (LANTUS PEN) 100 UNIT/ML pen  3 mL 3 12/23/2019    [facility] [address]    Sig: Inject 20 Units Subcutaneous every morning    Class: E-Prescribe    Notes to Pharmacy: If Lantus is not covered by insurance, may substitute Basaglar at same dose and frequency.      Route: Subcutaneous    Lidocaine (LIDOCARE) 4 % Patch    12/10/2019        Sig: Place 1 patch onto the skin every 24 hours To prevent lidocaine toxicity, patient should be patch free for 12 hrs daily.    Class: Transitional    Route: Transdermal    loperamide (IMODIUM) 2 MG capsule  90 capsule 1 12/18/2019    [facility] [address]    Sig: Take 1 capsule (2 mg) by mouth 4 times daily as needed for diarrhea    Class: E-Prescribe    Route: Oral    magnesium oxide (MAG-OX) 400 MG tablet  60  tablet 0 12/17/2019    [facility] [address]    Sig: Take 1 tablet (400 mg) by mouth 2 times daily    Class: E-Prescribe    Route: Oral    mirtazapine (REMERON) 15 MG tablet  30 tablet 0 12/17/2019    [facility] [address]    Sig: Take 1 tablet (15 mg) by mouth At Bedtime    Class: E-Prescribe    Route: Oral    Multiple Vitamin (THERAVITE PO)    3/14/2016        Sig: Take 1 tablet by mouth every morning     Class: Historical    Route: Oral    omeprazole (PRILOSEC) 40 MG DR capsule  90 capsule 2 12/17/2019    [place] [address]    Sig: Take 1 capsule (40 mg) by mouth daily    Class: E-Prescribe    Route: Oral    Renewals     Renewal requests to authorizing provider ([place] PA-C) <b>prohibited</b>    Renewal provider:  Natalie Chun MD          ondansetron (ZOFRAN) 4 MG tablet  30 tablet 3 12/18/2019    [facility] [address]    Sig: Take 1 tablet (4 mg) by mouth every 6 hours as needed for nausea or vomiting    Class: E-Prescribe    Route: Oral    order for DME  1 each 0 11/19/2019        Sig: Equipment being ordered: Cane ()  Treatment Diagnosis: impaired gait    Class: Local Print    phosphorus tablet 250 mg (PHOSPHA 250 NEUTRAL) 250 MG per tablet  60 tablet 0 12/17/2019    [facility] [address]    Sig: Take 1 tablet (250 mg) by mouth 2 times daily    Class: E-Prescribe    Route: Oral    predniSONE (DELTASONE) 5 MG tablet  30 tablet 0 12/18/2019    [facility] [address]    Sig: Take 1 tablet (5 mg) by mouth daily    Class: E-Prescribe    Route: Oral    prochlorperazine (COMPAZINE) 5 MG tablet    12/10/2019        Sig: Take 1 tablet (5 mg) by mouth every 6 hours as needed for nausea or vomiting (use after Zofran)    Class: Transitional     Route: Oral    rosuvastatin (CRESTOR) 5 MG tablet  30 tablet 3 12/17/2019    [facility] [address]    Sig: Take 1 tablet (5 mg) by mouth daily    Class: E-Prescribe    Route: Oral    sertraline (ZOLOFT) 50 MG tablet  30 tablet 1 12/26/2019    [facility] [address] [place] [address] [id number]    Sig: Take 1 tablet (50 mg) by mouth daily    Class: E-Prescribe    Route: Oral    simethicone (MYLICON) 80 MG chewable tablet    12/10/2019        Sig: Take 1 tablet (80 mg) by mouth every 6 hours as needed for cramping    Class: Transitional    Route: Oral    sodium bicarbonate 650 MG tablet  60 tablet 0 12/17/2019    [facility] [address]    Sig: Take 1 tablet (650 mg) by mouth 2 times daily    Class: E-Prescribe    Route: Oral    sodium chloride (OCEAN) 0.65 % nasal spray    12/10/2019        Sig: Spray 1 spray into both nostrils every hour as needed for congestion    Class: Transitional    Route: Both Nostrils    sulfamethoxazole-trimethoprim (BACTRIM/SEPTRA) 400-80 MG tablet  30 tablet 11 12/17/2019    [facility] [address]    Sig: Take 1 tablet by mouth daily    Class: E-Prescribe    Route: Oral    tacrolimus (GENERIC EQUIVALENT) 1 MG capsule  300 capsule 3 12/24/2019    [facility] - [place], [place] - [address]    Sig: Take 5 capsules (5 mg) by mouth 2 times daily    Class: E-Prescribe    Notes to Pharmacy: TXP DT 11/11/2019 (Liver) TXP Dischg DT 11/20/2019 DX Liver replaced by transplant Z94.4 [facility] ([place], [place])    Route: Oral    Prior authorization:  Closed - Other    tamsulosin (FLOMAX) 0.4 MG capsule  90 capsule 3 12/17/2019    [facility] [address]    Sig: TAKE 1 CAPSULE(0.4 MG) BY MOUTH DAILY    Class: E-Prescribe    Renewals      Renewal requests to authorizing provider ([place]ebeto [place] PA-C) <b>prohibited</b>    Renewal provider:  Natalie Chun MD          tenofovir (VIREAD) 300 MG tablet  30 tablet 11 12/17/2019    [facility] [address]    Sig: Take 1 tablet (300 mg) by mouth daily    Class: E-Prescribe    Route: Oral    valGANciclovir (VALCYTE) 450 MG tablet  30 tablet 5 12/17/2019    [place] [address]    Sig: Take 1 tablet (450 mg) by mouth daily    Class: E-Prescribe    Route: Oral    vitamin B-12 (CYANOCOBALAMIN) 1000 MCG tablet  30 tablet 0 12/17/2019    [place] [address]    Sig: Take 1 tablet (1,000 mcg) by mouth daily    Class: E-Prescribe    Route: Oral    vitamin D3 (CHOLECALCIFEROL) 2000 units (50 mcg) tablet  30 tablet 0 12/17/2019    [place] [address]    Sig: Take 1 tablet (2,000 Units) by mouth daily    Class: E-Prescribe    Route: Oral      Clinic-Administered Medications as of 12/30/2019       Dose Frequency Start End    Aflibercept (EYLEA) injection 2 mg 2 mg EVERY 28 DAYS 3/28/2019 2/27/2020    Admin Instructions: As needed 3-52 weeks<BR>RE    Route: Intravitreal    ranibizumab (LUCENTIS) injection syringe 0.5 mg 0.5 mg EVERY 28 DAYS 3/28/2019 2/27/2020    Admin Instructions: As needed 3-52 weekd<BR>RE    Route: Intravitreal        Codeine and Seasonal allergies   REVIEW OF SYSTEMS (check box if normal)  [x]               GENERAL  [x]                 PULMONARY [x]                GENITOURINARY  [x]                CNS                 [x]                 CARDIAC  [x]                 ENDOCRINE  [x]                EARS,NOSE,THROAT [x]                 GASTROINTESTINAL [x]                 NEUROLOGIC    [x]                MUSCLOSKELTAL  [x]                  HEMATOLOGY      PHYSICAL EXAM (check box if normal)/67 (BP Location: Right  arm, Patient Position: Sitting, Cuff Size: Adult Regular)   Pulse 96   Temp 97.5  F (36.4  C) (Oral)   Wt 75.9 kg (167 lb 6.4 oz)   SpO2 99%   BMI 24.72 kg/m          [x]            GENERAL:    [x]       EYES:  ICTERIC   []        YES  []                    NO  [x]           EXTREMITIES: Clubbing []       Y     [x]           N    [x]           EARS, NOSE, THROAT: Membranes Moist    YES   [x]                   NO []                  [x]           LUNGS:  CLEAR    YES       [x]                  NO    []                                [x]           SKIN: Jaundice           YES       []                  NO    [x]                   Rash: YES       []                  NO    [x]                                     [x]             HEART: Regular Rate          YES       [x]                  NO    []                   Incision Clean:  YES       [x]                  NO    []                                [x]                    ABDOMEN: Organomegaly YES       []                  NO    [x]                       [x]                    NEUROLOGICAL:  Nonfocal  YES       [x]                  NO    []                       [x]                    Hernia YES       []                  NO    [x]                   PSYCHIATRIC:  Appropriate  YES       [x]                  NO    []                       OTHER:                                                                                                   PAIN SCALE:: 3

## 2019-12-30 NOTE — TELEPHONE ENCOUNTER
Pt's phosphorus discontinued per Dr bob. Discussed with patient on the phone. Helped him identify the phosphorus and he removed the tablets from med box. Pt updated med box with adding zoloft and removing remeron for every other day.   Patient reports that he has new insurance starting Jan 1st. Pt will call with homecare to ensure which pharmacy he can use and also update [facility] with info.   Left message for homecare RN to return call to discuss.

## 2019-12-30 NOTE — TELEPHONE ENCOUNTER
Spoke w/ Pt: Requests Lanie to review and determine correct dose for lantus and send correct dose to pharmacy. Cannot refill until January 2020. Has 2 pens left. Please use [facility].     insulin glargine (LANTUS PEN) 100 UNIT/ML pen 3 mL 3 12/23/2019  No   Sig - Route: Inject 20 Units Subcutaneous every morning - Subcutaneous   Patient taking differently: Inject 24 Units Subcutaneous every morning

## 2019-12-31 ENCOUNTER — ALLIED HEALTH/NURSE VISIT (OUTPATIENT)
Dept: PHARMACY | Facility: CLINIC | Age: 54
End: 2019-12-31
Payer: COMMERCIAL

## 2019-12-31 DIAGNOSIS — E11.3293 TYPE 2 DIABETES MELLITUS WITH MILD NONPROLIFERATIVE RETINOPATHY OF BOTH EYES WITHOUT MACULAR EDEMA, UNSPECIFIED WHETHER LONG TERM INSULIN USE (H): Primary | ICD-10-CM

## 2019-12-31 DIAGNOSIS — N17.9 AKI (ACUTE KIDNEY INJURY) (H): ICD-10-CM

## 2019-12-31 PROCEDURE — 99606 MTMS BY PHARM EST 15 MIN: CPT | Performed by: PHARMACIST

## 2019-12-31 NOTE — PROGRESS NOTES
SUBJECTIVE/OBJECTIVE:                Frandy Workman is a 54 year old male coming in for a follow up MTM visit.  He was referred post txp.     Chief Complaint: discussing BG today.     Allergies/ADRs: Reviewed in Epic  Tobacco:  reports that he has never smoked. He quit smokeless tobacco use about 2 years ago.  His smokeless tobacco use included chew.  Alcohol: not currently using  Caffeine: 0-1 cups/day of coffee  PMH: Reviewed in Epic    Medication Adherence/Access:  Patient uses pill box(es). Patient has homecare coming twice weekly to help with meds.   Patient takes medications 3 time(s) per day. 8, 6, 8  Per patient, misses medication 0 times per week.   Medication barriers: none.   The patient fills medications at [place]: YES.    Diabetes:  Pt currently taking Lantus 24 units daily and Novolog 1 unit per 8 g of CHO with sliding scale. Pt is not experiencing side effects. Followed by Endocrine. As seen in my telephone discussion with him on , there was some confusion about what dose of Lantus he should be giving. He has used 24 units since that date.   SMB-3 times daily.   Ranges (patient reported):   Date FBG/ 2hours post Lunch/2hours post Dinner /2hours post Bedtime    24 U 206       24 U 195 140 129 184    24 U 253 79 211 83    24U 181 148      Lantus 24U 182 171 138 150     Lantus 20U 190 182 123 108    Lantus 20 U 156 123 178 130     Date FBG/ 2hours post Lunch/2hours post Dinner /2hours post Bedtime    226 95 106 108    259 326 170 226    202 200 139 168    332 (cookies) 195 142 144    (increased to Lantus 24 units)    133   No lows recently.   Recent symptoms of high blood sugar? none  Eye exam: up to date  Foot exam: up to date  ACEi/ARB: No.   Urine Albumin:         Lab Results   Component Value Date     UMALCR 9.21 2019    Aspirin: Taking 325mg daily and denies side effects  Diet/Exercise: he is still struggling with  appetite but is doing his best to follow endo's directions.     Today's Vitals: There were no vitals taken for this visit.    ASSESSMENT:                 Medication Adherence: good, no issues identified.    Diabetes:  BG Still 150-200 in the AM, increasing Lantus to 28 units daily as approved by LILLIAN Mari, at our last visit.     PLAN:                Pt to...  1. Increase Lantus to 28 units once daily.     I spent 15 minutes with this patient today. I offer these suggestions for consideration by txp team. A copy of the visit note was provided to the patient's referring provider.    Will follow up 2 weeks.    The patient was given a summary of these recommendations as an after visit summary.    Donald Bradley, PharmD  [facility] Pharmacist    Phone: [phone]

## 2019-12-31 NOTE — TELEPHONE ENCOUNTER
Lanie Atkinson PA-C Miller, Deanne M, RN   Caller: Unspecified (Yesterday,  1:02 PM)             Adjusted Lantus order to 24 units as per patient report of use, and filled through preferred mail pharmacy.   Lanie

## 2019-12-31 NOTE — Clinical Note
GIOVANNI- you will be seeing patient on 1/9/20. Currently takign Lantus 28 units once daily, and Novolog 1 unit per 8 g carbs 

## 2020-01-02 ENCOUNTER — TELEPHONE (OUTPATIENT)
Dept: TRANSPLANT | Facility: CLINIC | Age: 55
End: 2020-01-02

## 2020-01-02 ENCOUNTER — MEDICAL CORRESPONDENCE (OUTPATIENT)
Dept: HEALTH INFORMATION MANAGEMENT | Facility: CLINIC | Age: 55
End: 2020-01-02

## 2020-01-02 LAB
ALBUMIN SERPL-MCNC: 3.9 G/DL (ref 3.4–5)
ALP SERPL-CCNC: 112 U/L (ref 40–150)
ALT SERPL W P-5'-P-CCNC: 19 U/L (ref 0–70)
ANION GAP SERPL CALCULATED.3IONS-SCNC: 5 MMOL/L (ref 3–14)
AST SERPL W P-5'-P-CCNC: 14 U/L (ref 0–45)
BILIRUB DIRECT SERPL-MCNC: 0.1 MG/DL (ref 0–0.2)
BILIRUB SERPL-MCNC: 0.5 MG/DL (ref 0.2–1.3)
BUN SERPL-MCNC: 56 MG/DL (ref 7–30)
CALCIUM SERPL-MCNC: 9 MG/DL (ref 8.5–10.1)
CHLORIDE SERPL-SCNC: 111 MMOL/L (ref 94–109)
CO2 SERPL-SCNC: 24 MMOL/L (ref 20–32)
CREAT SERPL-MCNC: 1.92 MG/DL (ref 0.66–1.25)
ERYTHROCYTE [DISTWIDTH] IN BLOOD BY AUTOMATED COUNT: 21.4 % (ref 10–15)
GFR SERPL CREATININE-BSD FRML MDRD: 38 ML/MIN/{1.73_M2}
GLUCOSE SERPL-MCNC: 101 MG/DL (ref 70–99)
HCT VFR BLD AUTO: 23.3 % (ref 40–53)
HGB BLD-MCNC: 7.7 G/DL (ref 13.3–17.7)
MAGNESIUM SERPL-MCNC: 2.2 MG/DL (ref 1.6–2.3)
MCH RBC QN AUTO: 32.5 PG (ref 26.5–33)
MCHC RBC AUTO-ENTMCNC: 33 G/DL (ref 31.5–36.5)
MCV RBC AUTO: 98 FL (ref 78–100)
PHOSPHATE SERPL-MCNC: 3.1 MG/DL (ref 2.5–4.5)
PLATELET # BLD AUTO: 92 10E9/L (ref 150–450)
POTASSIUM SERPL-SCNC: 4.4 MMOL/L (ref 3.4–5.3)
PROT SERPL-MCNC: 7.4 G/DL (ref 6.8–8.8)
RBC # BLD AUTO: 2.37 10E12/L (ref 4.4–5.9)
SODIUM SERPL-SCNC: 140 MMOL/L (ref 133–144)
TACROLIMUS BLD-MCNC: 6.5 UG/L (ref 5–15)
TME LAST DOSE: NORMAL H
WBC # BLD AUTO: 4.4 10E9/L (ref 4–11)

## 2020-01-02 PROCEDURE — 83735 ASSAY OF MAGNESIUM: CPT | Performed by: SURGERY

## 2020-01-02 PROCEDURE — 80197 ASSAY OF TACROLIMUS: CPT | Performed by: SURGERY

## 2020-01-02 PROCEDURE — 80048 BASIC METABOLIC PNL TOTAL CA: CPT | Performed by: SURGERY

## 2020-01-02 PROCEDURE — 84100 ASSAY OF PHOSPHORUS: CPT | Performed by: SURGERY

## 2020-01-02 PROCEDURE — 80076 HEPATIC FUNCTION PANEL: CPT | Performed by: SURGERY

## 2020-01-02 PROCEDURE — 85027 COMPLETE CBC AUTOMATED: CPT | Performed by: SURGERY

## 2020-01-02 NOTE — TELEPHONE ENCOUNTER
Provider Call: General  Route to LPN    Reason for call: Call from HHN re interactions re new med Zoloft and ASA      Call back needed? Yes    Return Call Needed  Same as documented in contacts section  When to return call?: Greater than one day: Route standard priority

## 2020-01-06 ENCOUNTER — MEDICAL CORRESPONDENCE (OUTPATIENT)
Dept: HEALTH INFORMATION MANAGEMENT | Facility: CLINIC | Age: 55
End: 2020-01-06

## 2020-01-06 LAB
ALBUMIN SERPL-MCNC: 4.2 G/DL (ref 3.4–5)
ALP SERPL-CCNC: 123 U/L (ref 40–150)
ALT SERPL W P-5'-P-CCNC: 23 U/L (ref 0–70)
ANION GAP SERPL CALCULATED.3IONS-SCNC: 7 MMOL/L (ref 3–14)
AST SERPL W P-5'-P-CCNC: 17 U/L (ref 0–45)
BILIRUB DIRECT SERPL-MCNC: 0.1 MG/DL (ref 0–0.2)
BILIRUB SERPL-MCNC: 0.5 MG/DL (ref 0.2–1.3)
BUN SERPL-MCNC: 45 MG/DL (ref 7–30)
CALCIUM SERPL-MCNC: 9.3 MG/DL (ref 8.5–10.1)
CHLORIDE SERPL-SCNC: 108 MMOL/L (ref 94–109)
CO2 SERPL-SCNC: 25 MMOL/L (ref 20–32)
CREAT SERPL-MCNC: 1.95 MG/DL (ref 0.66–1.25)
ERYTHROCYTE [DISTWIDTH] IN BLOOD BY AUTOMATED COUNT: 21.5 % (ref 10–15)
GFR SERPL CREATININE-BSD FRML MDRD: 37 ML/MIN/{1.73_M2}
GLUCOSE SERPL-MCNC: 161 MG/DL (ref 70–99)
HCT VFR BLD AUTO: 24.4 % (ref 40–53)
HGB BLD-MCNC: 8.2 G/DL (ref 13.3–17.7)
MAGNESIUM SERPL-MCNC: 2.2 MG/DL (ref 1.6–2.3)
MCH RBC QN AUTO: 32.8 PG (ref 26.5–33)
MCHC RBC AUTO-ENTMCNC: 33.6 G/DL (ref 31.5–36.5)
MCV RBC AUTO: 98 FL (ref 78–100)
PHOSPHATE SERPL-MCNC: 2.8 MG/DL (ref 2.5–4.5)
PLATELET # BLD AUTO: 95 10E9/L (ref 150–450)
POTASSIUM SERPL-SCNC: 4.8 MMOL/L (ref 3.4–5.3)
PROT SERPL-MCNC: 8 G/DL (ref 6.8–8.8)
RBC # BLD AUTO: 2.5 10E12/L (ref 4.4–5.9)
SODIUM SERPL-SCNC: 140 MMOL/L (ref 133–144)
TACROLIMUS BLD-MCNC: 7.2 UG/L (ref 5–15)
TME LAST DOSE: NORMAL H
WBC # BLD AUTO: 5.6 10E9/L (ref 4–11)

## 2020-01-06 PROCEDURE — 83735 ASSAY OF MAGNESIUM: CPT | Performed by: SURGERY

## 2020-01-06 PROCEDURE — 80197 ASSAY OF TACROLIMUS: CPT | Performed by: SURGERY

## 2020-01-06 PROCEDURE — 84100 ASSAY OF PHOSPHORUS: CPT | Performed by: SURGERY

## 2020-01-06 PROCEDURE — 85027 COMPLETE CBC AUTOMATED: CPT | Performed by: SURGERY

## 2020-01-06 PROCEDURE — 80076 HEPATIC FUNCTION PANEL: CPT | Performed by: SURGERY

## 2020-01-06 PROCEDURE — 80048 BASIC METABOLIC PNL TOTAL CA: CPT | Performed by: SURGERY

## 2020-01-08 NOTE — PROGRESS NOTES
[facility]  Endocrinology  Tish Toscano PA-C  01/09/2020      Chief Complaint:   Diabetes    History of Present Illness:   Frandy Workman is a 54 year old male with a history of liver cirrhosis secondary to ESTRADA, HCC 2018, S/p liver transplant 11/11/19 complicated by hematoma evacuation on POD 1, coronary artery disease, type 2 diabetes, hyperlipidemia, and hypertension who presents with his PCA Gabrielle for follow-up.     He was admitted to [facility] 12/2-12/18 with nausea, vomiting, weakness, confusion and poor appetite x 1 week and labs revealing JERONIMO. Underwent upper endoscopy and colonoscopy on 12/6 and found to have mild gastropathy; duodenal, stomach, and colon bx + mycophenolate toxicity. Mycophenolate stopped as of evening 12/10 and started on Imuran 150mg daily per Transplant team. Last tacro level subtherapeutic on 12/16 so dose increased. Hemoglobin A1c was 5.1% on 12/2/19. He has been diagnosed with type 2 diabetes since age 29 and has required insulin since 2001. He was on Tresiba 55 units, Metformin ER 500mg w/ supper, and Farxiga 10mg daily prior to transplant. He was recommended to continue on Lantus 14 units daily, carb coverage 1u: 10g CHO, MSSI, and follow-up with endocrinology at time of discharge.     He had a virtual visit with Lanie Atkinson on 12/19/19 where they discussed that he was resumed on Prednisone 5 mg daily on 12/16 and was on cycled tube feeds with NPH insulin while at [facility]. He reported compliance with the diabetes regimen recommended at discharge. She instructed Miller to increase to 20 units of Lantus qAM, increase to 1 unit per 8 g CHO with meals, beginning with lunch. He was to continue moderate intensity sliding scale. He saw Donald Bradley RPH on 12/31/19 with BG still 150-200 in the AM, so increased Lantus to 28 units daily.     He reviews that he first met with Dr. Wilcox 3 years ago to try to fix his A1c, most recently onTresiba 55 units, Metformin ER 500mg w/ supper, and  "Farxiga 10mg daily before the transplant. He was also taking Novolog 1 unit for every 4g of carbs. Since his liver transplant in November 2019, his diabetes has been difficult to control as they were pushing him to eat afterwards for recovery. He was taken off metformin and Farxiga and switched him from Tresiba to Lantus.     Today:  He is now on Basaglar 28 units in the morning, Novolog sliding scale (1 unit for every 50 greater than 140 before meals, no bedtime scale) and carb coverage (1 unit of every 8g carbs). He does not have glucagon at home and wonders if he could have this to keep on hand.     He finds it difficult to manage his diet, as his liver transplant team wants him to be eating 0122-5231 calories a day with 100g of protein and do not care how many carbs he eats, which he needs to watch to manage his diabetes. He has not been on a feeding tube since [facility] discharge on 12/18/19.     In regards to the low of 50mg/dL on Sunday (1/5), he was vomiting and constipated and went low after he dosed for a full plate of spaghetti, but had emesis after eating half.  He did actually finish the other half, but felt low about an hour later. He did not feel like eating lunch and waiting until dinner 5 hours later. He was eating spaghetti and meatballs, vomited, then finished the meal. He took enough insulin for 70g of carbs, but thinks he only ingested around 20g. His PCA notes that he will often dose before meals and not finish them or more commonly dose for both a meal that he is eating now and a snack he will eat in two hours to avoid another poke later. He reports that he will always eat breakfast but his lunch or dinner is \"hit or miss.\" He also drinks Boost as needed throughout the day.      He has also been having hot flashes and night sweats with his body temperature gradually increasing throughout the day. He wonders if this is from glycemic control. He does nap often during the day and reports some insomnia " "at night. He notes struggling with his mental health with getting his \"life back\" but defers seeing health psychology.     He wonders if he is okay to continue with his OT given recent blood work and if he should ask his liver specialist about this.    Blood Glucose Monitoring:  We reviewed glucometer data together. He did not bring in his actual glucometer but brought in recorded data logs that also included insulin doses.      He is faithfully checking BG and giving Novolog 3 x/day.      Last week:  Low of 50 in the evening on 1/5/20 and high of 206 (morning before breakfast)    Fasting blood glucose measures in the AM:   183, 178, 185, 143, 198, 178, 135, 117      Diabetes monitoring and complications:  CAD: Yes  Last eye exam results: 10/9/19 with Dr. Martin,  moderate nonproliferative diabetic retinopathy of both eyes   Microalbuminuria: 102.4 on 12/2/19   Neuropathy: Yes  HTN: Yes  On Statin: Yes, Crestor   On Aspirin: Yes  Depression: Yes  Erectile dysfunction: Yes    Patient Supplied Answers To Diabetic Questionnaire  including 1/8/2020   1. Since your last visit to our clinic (or if this your first visit, since you last saw your primary care provider), have you experienced any of the following symptoms that may be related to low blood sugars? Sweating that wasn't due to exertion, Shaking or trembling, Lightheadedness   2. Since your last visit to our clinic (or if this your first visit, since you last saw your primary care provider), have you experienced any of the following symptoms that may be related to prolonged high blood sugars? Fatigue   4. Do you have any female family members who have had heart attacks or strokes before age 60 or male relatives who have had heart attacks or strokes before age 50? Yes   5. Do you have any family members who have had complications from diabetes such as kidney disease, heart disease or strokes, retinopathy (a vision problem), or amputations? Yes   6. Who do you " live with?  Self   Little interest or pleasure in doing things? More than half the days   Feeling down, depressed, or hopeless? Nearly everyday      Review of Systems:   Pertinent items are noted in HPI.  All other systems are negative.    Active Medications:      Acetaminophen (TYLENOL) 325 MG CAPS, Take 325-650 mg by mouth every 4 hours as needed (pain, fever), Disp: 100 capsule, Rfl: 3     alpha-lipoic acid 600 MG capsule, Take 1 capsule (600 mg) by mouth daily, Disp: 90 capsule, Rfl: 3     Artificial Tear Solution (SM ARTIFICIAL TEARS) SOLN, Place 1 drop into the right eye every hour as needed (dry eyes) Apply at least 4 times daily and as needed for dry eye, Disp: 1 Bottle, Rfl: 3     aspirin (ASA) 325 MG EC tablet, Take 1 tablet (325 mg) by mouth daily, Disp: 30 tablet, Rfl: 3     azaTHIOprine (IMURAN) 50 MG tablet, Take 3 tablets (150 mg) by mouth daily, Disp: 90 tablet, Rfl: 3     carvedilol (COREG) 12.5 MG tablet, Take 1 tablet (12.5 mg) by mouth 2 times daily (with meals), Disp: 60 tablet, Rfl: 0     econazole nitrate 1 % external cream, Apply topically daily To feet and toenails., Disp: 85 g, Rfl: 0     ferrous sulfate (FEROSUL) 325 (65 Fe) MG tablet, Take 1 tablet (325 mg) by mouth 3 times daily (with meals), Disp: 90 tablet, Rfl: 3     insulin aspart (NOVOLOG PEN) 100 UNIT/ML pen, Dose = 1 unit per 8 grams of carbohydrate with meals and snacks, administer within 30 minutes of start of meal, Disp: 3 mL, Rfl: 1     insulin aspart (NOVOLOG PEN) 100 UNIT/ML pen, Give with meals and snacks Do Not give Correction Insulin if Pre-Meal BG less than 140.  For Pre-Meal  - 189 give 1 unit.  For Pre-Meal  - 239 give 2 units.  For Pre-Meal  - 289 give 3 units.  For Pre-Meal  - 339 give 4 units.  For Pre-Meal - 399 give 5 units.  For Pre-Meal -449 give 6 units For Pre-Meal BG greater than or equal to 450 give 7 units.  To be given with prandial insulin, and based on pre-meal blood  glucose.   If given at mealtime, administer within 30 minutes of start of meal, Disp: 3 mL, Rfl: 1     insulin glargine (LANTUS PEN) 100 UNIT/ML pen, Inject 24 Units Subcutaneous every morning, Disp: 3 mL, Rfl: 3     Lidocaine (LIDOCARE) 4 % Patch, Place 1 patch onto the skin every 24 hours To prevent lidocaine toxicity, patient should be patch free for 12 hrs daily., Disp: , Rfl:      loperamide (IMODIUM) 2 MG capsule, Take 1 capsule (2 mg) by mouth 4 times daily as needed for diarrhea, Disp: 90 capsule, Rfl: 1     magnesium oxide (MAG-OX) 400 MG tablet, Take 1 tablet (400 mg) by mouth 2 times daily, Disp: 60 tablet, Rfl: 11     Multiple Vitamin (THERAVITE PO), Take 1 tablet by mouth every morning , Disp: , Rfl:      omeprazole (PRILOSEC) 40 MG DR capsule, Take 1 capsule (40 mg) by mouth daily, Disp: 90 capsule, Rfl: 2     ondansetron (ZOFRAN) 4 MG tablet, Take 1 tablet (4 mg) by mouth every 6 hours as needed for nausea or vomiting, Disp: 30 tablet, Rfl: 3     predniSONE (DELTASONE) 5 MG tablet, Take 1 tablet (5 mg) by mouth daily, Disp: 30 tablet, Rfl: 0     prochlorperazine (COMPAZINE) 5 MG tablet, Take 1 tablet (5 mg) by mouth every 6 hours as needed for nausea or vomiting (use after Zofran), Disp: , Rfl:      rosuvastatin (CRESTOR) 5 MG tablet, Take 1 tablet (5 mg) by mouth daily, Disp: 30 tablet, Rfl: 3     sertraline (ZOLOFT) 50 MG tablet, Take 1 tablet (50 mg) by mouth daily, Disp: 30 tablet, Rfl: 1     simethicone (MYLICON) 80 MG chewable tablet, Take 1 tablet (80 mg) by mouth every 6 hours as needed for cramping, Disp: , Rfl:      sodium bicarbonate 650 MG tablet, Take 1 tablet (650 mg) by mouth 2 times daily, Disp: 60 tablet, Rfl: 0     sodium chloride (OCEAN) 0.65 % nasal spray, Spray 1 spray into both nostrils every hour as needed for congestion, Disp: , Rfl:      sulfamethoxazole-trimethoprim (BACTRIM/SEPTRA) 400-80 MG tablet, Take 1 tablet by mouth daily, Disp: 30 tablet, Rfl: 11     tacrolimus  (GENERIC EQUIVALENT) 1 MG capsule, Take 5 capsules (5 mg) by mouth 2 times daily, Disp: 300 capsule, Rfl: 3     tamsulosin (FLOMAX) 0.4 MG capsule, TAKE 1 CAPSULE(0.4 MG) BY MOUTH DAILY, Disp: 90 capsule, Rfl: 3     tenofovir (VIREAD) 300 MG tablet, Take 1 tablet (300 mg) by mouth daily, Disp: 30 tablet, Rfl: 11     valGANciclovir (VALCYTE) 450 MG tablet, Take 1 tablet (450 mg) by mouth daily, Disp: 30 tablet, Rfl: 5     vitamin B-12 (CYANOCOBALAMIN) 1000 MCG tablet, Take 1 tablet (1,000 mcg) by mouth daily, Disp: 30 tablet, Rfl: 0     vitamin D3 (CHOLECALCIFEROL) 2000 units (50 mcg) tablet, Take 1 tablet (2,000 Units) by mouth daily, Disp: 30 tablet, Rfl: 0     glucose (BD GLUCOSE) 4 g chewable tablet, Take 1 tablet by mouth every hour as needed for low blood sugar (Patient not taking: Reported on 2019), Disp: 60 tablet, Rfl: 1     mirtazapine (REMERON) 15 MG tablet, Take 1 tablet (15 mg) by mouth every other day for 7 days Pt weaning off, Disp: 7 tablet, Rfl: 0    Allergies:   Codeine and Seasonal allergies      Past Medical History:  Anemia   Arthritis    BPH   Coronary artery disease   Cholelithiasis    Depression   GERD   HCC  Diabetic retinopathy   Conducive hearing loss   Hypertension   Hyperlipidemia   Liver cirrhosis secondary to ESTRADA   Liver transplant   Portal vein thrombosis   Type 2 diabetes   Bipolar affective disorder in remission   Brow ptosis   Erectile dysfunction   Thrombocytopenia   Esophageal varices   CKD stage 2   JERONIMO    Malnutrition      Past Surgical History:  Colonoscopy x2   Heart cath with possible intervention, 2019   Esophagoscopy, gastroscopy, duodenoscopy, combined x4  Head and neck surgery   Implant gold weight eyelid, right   Chemo embolization   Knee surgery, left   Orthopedic surgery   Parotidectomy, radical neck dissection, right   PICC insertion, left   Return liver transplant, 2019  Transplant liver,  donor, 201   Vascular surgery     Family History:   Maternal  "grandfather - prostate cancer, alcohol abuse   Father - colon cancer, pancreatic cancer, prostate cancer, colorectal cancer, macular degeneration, glacuoma, skin cancer   Maternal grandmother - colorectal cancer, alcohol abuse   Paternal grandmother - colorectal cancer   Mother - cancer, diabetes, cerebrovascular disease  ( age 80), thyroid disease, depression   Sister - asthma, thyroid disease, depression       Social History:   The patient was accompanied to the appointment by: GERARDO Anthony   Smoking Status: Never smoker    Smokeless Tobacco: Former user of chew, 1 tin per week   Alcohol Use: No, quit 1996      Physical Exam:   /65   Pulse 86   Ht 1.765 m (5' 9.5\")   Wt 73.9 kg (162 lb 14.4 oz)   BMI 23.71 kg/m       Wt Readings from Last 10 Encounters:   20 73.9 kg (162 lb 14.4 oz)   19 75.9 kg (167 lb 6.4 oz)   19 73.6 kg (162 lb 4.8 oz)   12/10/19 76.7 kg (169 lb 1.6 oz)   19 74.8 kg (165 lb)   19 74.9 kg (165 lb 3.2 oz)   19 78.3 kg (172 lb 11.2 oz)   19 76.9 kg (169 lb 9.6 oz)   19 80.2 kg (176 lb 12.8 oz)   10/28/19 78 kg (172 lb)      General: Pleasant, somewhat frail appearing male in NAD. Accompanied by Gabrielle.  Both have many questions about insulin use.    Psych:  Mood is \"good,\" affect is appropriate.  Thought form and content are fluid and coherent.    HEENT: Eyes and sclera are clear. Extraocular movements are grossly intact without proptosis.  Nares are patent, mucous membranes moist.  Neck: No masses or JVD are noted.    Resp: Easy and unlabored breathing.   Neuro: Alert and oriented, communicating clearly.   Ext: No swelling or edema         Data:  Lab Results   Component Value Date     2020    POTASSIUM 4.3 2020    CHLORIDE 108 2020    CO2 26 2020    ANIONGAP 5 2020     (H) 2020    BUN 43 (H) 2020    CR 2.01 (H) 2020    DEBORAH 9.4 2020     Lab Results   Component " Value Date    GFRESTIMATED 36 (L) 01/09/2020    GFRESTIMATED 37 (L) 01/06/2020    GFRESTIMATED 38 (L) 01/02/2020    GFRESTBLACK 42 (L) 01/09/2020    GFRESTBLACK 43 (L) 01/06/2020    GFRESTBLACK 44 (L) 01/02/2020      Lab Results   Component Value Date    MICROL 60 12/02/2019    UMALCR 102.40 (H) 12/02/2019      Lab Results   Component Value Date    A1C 6.6 (H) 08/08/2018    A1C 6.5 (H) 06/09/2017    A1C 7.8 (H) 10/25/2016    HEMOGLOBINA1 5.1 12/02/2019    HEMOGLOBINA1 5.4 08/14/2019    HEMOGLOBINA1 6.1 (A) 02/11/2019    HEMOGLOBINA1 8.1 (A) 04/11/2018    HEMOGLOBINA1 7.6 (A) 10/16/2017     Lab Results   Component Value Date    CHOL 131 02/04/2019    CHOL 133 08/08/2018    TRIG 117 02/04/2019    TRIG 172 (H) 08/08/2018    HDL 26 (L) 02/04/2019    HDL 30 (L) 08/08/2018    LDL 81 02/04/2019    LDL 68 08/08/2018    NHDL 105 02/04/2019    NHDL 103 08/08/2018     Assessment and Plan:   Type 2 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, unspecified whether long term insulin use (H)  Miller is a 54 year old male with complex medical history including liver disease and nephropathy as well as type 2 diabetes, recently exacerbated with steroid use. Currently his doses seem to be working quite well and I do not think they need to be changed, however he does have a lot of confusion surrounding what is carb coverage, how the sliding scale works, how often this can be given, and when he can resume other medications, as well as how his regimen should be modified for rehab exercises. We spent an extensive amount of time, over 30 minutes, in education. I am encouraging him to see diabetes education in the coming weeks, which he is agreeable to  and I will see him back in one month due to his dynamic medical condition. He does need to establish care with a new endocrinologist since Dr. Wilcox left.    - TSH with free T4 reflex  - Glucagon 3 MG/DOSE POWD  Dispense: 1 each; Refill: 1  -  insulin glargine (BASAGLAR KWIKPEN) 100 UNIT/ML pen  Dispense: 30 mL; Refill: 3  - insulin aspart (NOVOLOG PEN) 100 UNIT/ML pen  Dispense: 3 mL; Refill: 1      Follow-up: Return in about 1 month (around 2/9/2020).       I think your insulin doses are appropriate and do not need to change.      Please continue:   - Basaglar 28 units once daily   - Novolog 1 unit: 8 g carbohydrate   - 1 unit 50 mg/dl  >140 as you have been.    We expect these doses will decrease when your Prednisone dose decreases. Please advise us when this happens and watch BG closely.    If you increase your physical activity, such as rehab - you will need less insulin.  Please subtract 2 units from your dose prior to any rehab session to prevent low blood sugar interrupting work out.    If you feel nausea prior to a meal, please hold dose until after eating. Take as soon as you see you will keep the meal down.  You may need to correct more at the next meal.     - If you feel low blood sugar at any time, please CHECK.  If <70, give 15 grams of carbohydrate and recheck in 15 minutes.          >50% of 40 minute visit spent in face to face counseling, education and coordination of care related to options for better glycemic control as well as preventing, detecting, and treating hypoglycemia.      It is my privilege to be involved in the care of the above patient.     Tish Toscano PA-C, [facility]  [facility]  Diabetes, Endocrinology, and Metabolism  [phone] Appointments/Nurse  [phone] Fax  [phone] pager  [phone] nurse line             Scribe Disclosure:  I, Prachi Lainez, am serving as a scribe to document services personally performed by Tish Toscano PA-C at this visit, based upon the provider's statements to me. All documentation has been reviewed by the aforementioned provider prior to being entered into the official medical record.     Portions of this medical record were completed by a scribe. UPON MY  REVIEW AND AUTHENTICATION BY ELECTRONIC SIGNATURE, this confirms (a) I performed the applicable clinical services, and (b) the record is accurate.

## 2020-01-09 ENCOUNTER — OFFICE VISIT (OUTPATIENT)
Dept: ENDOCRINOLOGY | Facility: CLINIC | Age: 55
End: 2020-01-09
Payer: MEDICARE

## 2020-01-09 ENCOUNTER — TELEPHONE (OUTPATIENT)
Dept: ENDOCRINOLOGY | Facility: CLINIC | Age: 55
End: 2020-01-09

## 2020-01-09 VITALS
BODY MASS INDEX: 23.32 KG/M2 | SYSTOLIC BLOOD PRESSURE: 105 MMHG | WEIGHT: 162.9 LBS | HEART RATE: 86 BPM | DIASTOLIC BLOOD PRESSURE: 65 MMHG | HEIGHT: 70 IN

## 2020-01-09 DIAGNOSIS — L60.2 ONYCHAUXIS: ICD-10-CM

## 2020-01-09 DIAGNOSIS — E11.49 TYPE II OR UNSPECIFIED TYPE DIABETES MELLITUS WITH NEUROLOGICAL MANIFESTATIONS, NOT STATED AS UNCONTROLLED(250.60) (H): Primary | ICD-10-CM

## 2020-01-09 DIAGNOSIS — E11.3293 TYPE 2 DIABETES MELLITUS WITH MILD NONPROLIFERATIVE RETINOPATHY OF BOTH EYES WITHOUT MACULAR EDEMA, UNSPECIFIED WHETHER LONG TERM INSULIN USE (H): Primary | ICD-10-CM

## 2020-01-09 LAB
ALBUMIN SERPL-MCNC: 4.4 G/DL (ref 3.4–5)
ALP SERPL-CCNC: 124 U/L (ref 40–150)
ALT SERPL W P-5'-P-CCNC: 22 U/L (ref 0–70)
ANION GAP SERPL CALCULATED.3IONS-SCNC: 5 MMOL/L (ref 3–14)
AST SERPL W P-5'-P-CCNC: 17 U/L (ref 0–45)
BILIRUB DIRECT SERPL-MCNC: 0.1 MG/DL (ref 0–0.2)
BILIRUB SERPL-MCNC: 0.6 MG/DL (ref 0.2–1.3)
BUN SERPL-MCNC: 43 MG/DL (ref 7–30)
CALCIUM SERPL-MCNC: 9.4 MG/DL (ref 8.5–10.1)
CHLORIDE SERPL-SCNC: 108 MMOL/L (ref 94–109)
CO2 SERPL-SCNC: 26 MMOL/L (ref 20–32)
CREAT SERPL-MCNC: 2.01 MG/DL (ref 0.66–1.25)
ERYTHROCYTE [DISTWIDTH] IN BLOOD BY AUTOMATED COUNT: 21.9 % (ref 10–15)
GFR SERPL CREATININE-BSD FRML MDRD: 36 ML/MIN/{1.73_M2}
GLUCOSE SERPL-MCNC: 203 MG/DL (ref 70–99)
HCT VFR BLD AUTO: 24 % (ref 40–53)
HGB BLD-MCNC: 8 G/DL (ref 13.3–17.7)
MAGNESIUM SERPL-MCNC: 2.2 MG/DL (ref 1.6–2.3)
MCH RBC QN AUTO: 33.3 PG (ref 26.5–33)
MCHC RBC AUTO-ENTMCNC: 33.3 G/DL (ref 31.5–36.5)
MCV RBC AUTO: 100 FL (ref 78–100)
PHOSPHATE SERPL-MCNC: 3 MG/DL (ref 2.5–4.5)
PLATELET # BLD AUTO: 96 10E9/L (ref 150–450)
POTASSIUM SERPL-SCNC: 4.3 MMOL/L (ref 3.4–5.3)
PROT SERPL-MCNC: 8 G/DL (ref 6.8–8.8)
RBC # BLD AUTO: 2.4 10E12/L (ref 4.4–5.9)
SODIUM SERPL-SCNC: 139 MMOL/L (ref 133–144)
TACROLIMUS BLD-MCNC: 6.3 UG/L (ref 5–15)
TME LAST DOSE: NORMAL H
WBC # BLD AUTO: 4.7 10E9/L (ref 4–11)

## 2020-01-09 PROCEDURE — 80076 HEPATIC FUNCTION PANEL: CPT | Performed by: SURGERY

## 2020-01-09 PROCEDURE — 83735 ASSAY OF MAGNESIUM: CPT | Performed by: SURGERY

## 2020-01-09 PROCEDURE — 85027 COMPLETE CBC AUTOMATED: CPT | Performed by: SURGERY

## 2020-01-09 PROCEDURE — 80197 ASSAY OF TACROLIMUS: CPT | Performed by: SURGERY

## 2020-01-09 PROCEDURE — 80048 BASIC METABOLIC PNL TOTAL CA: CPT | Performed by: SURGERY

## 2020-01-09 PROCEDURE — 84100 ASSAY OF PHOSPHORUS: CPT | Performed by: SURGERY

## 2020-01-09 RX ORDER — INSULIN GLARGINE 100 [IU]/ML
28 INJECTION, SOLUTION SUBCUTANEOUS DAILY
Qty: 30 ML | Refills: 3 | Status: ON HOLD | OUTPATIENT
Start: 2020-01-09 | End: 2020-06-12

## 2020-01-09 ASSESSMENT — MIFFLIN-ST. JEOR: SCORE: 1572.22

## 2020-01-09 ASSESSMENT — PAIN SCALES - GENERAL: PAINLEVEL: MODERATE PAIN (5)

## 2020-01-09 ASSESSMENT — PATIENT HEALTH QUESTIONNAIRE - PHQ9: SUM OF ALL RESPONSES TO PHQ QUESTIONS 1-9: 16

## 2020-01-09 NOTE — NURSING NOTE
Depression Response    Patient completed the PHQ-9 assessment for depression and scored >9? Yes  Question 9 on the PHQ-9 was positive for suicidality? No    I personally notified the following: clinic nurse ashley

## 2020-01-09 NOTE — PATIENT INSTRUCTIONS
It is good to meet you!    I think your insulin doses are appropriate and do not need to change.      Please continue:   - Basaglar 28 units once daily   - Novolog 1 unit: 8 g carbohydrate   - 1 unit 50 mg/dl  >140 as you have been.    We expect these doses will decrease when your Prednisone dose decreases. Please advise us when this happens and watch BG closely.    If you increase your physical activity, such as rehab - you will need less insulin.  Please subtract 2 units from your dose prior to any rehab session to prevent low blood sugar interrupting work out.    If you feel nausea prior to a meal, please hold dose until after eating. Take as soon as you see you will keep the meal down.  You may need to correct more at the next meal.     - If you feel low blood sugar at any time, please CHECK.  If <70, give 15 grams of carbohydrate and recheck in 15 minutes.      My best wishes,    Tish Toscano PA-C, MPAS  [facility]  Diabetes, Endocrinology, and Metabolism  [phone] Appointments/Nurse  [phone] Fax  [phone] nurse line  [phone] URGENT after hours/weekend Endocrinologist on call

## 2020-01-09 NOTE — LETTER
1/9/2020       RE: Frandy Workman  [address] [facility] [address]     Dear Colleague,    Thank you for referring your patient, Frandy Workman, to the [facility] ENDOCRINOLOGY at [facility]. Please see a copy of my visit note below.    Past Medical History:   Diagnosis Date     Anemia 2013     Arthritis      BPH (benign prostatic hyperplasia)      CAD (coronary artery disease) 4/1/2019     Cholelithiasis      Conductive hearing loss 08/16/2017     Depressive disorder 1986    Suffer effects throughout life     Gastroesophageal reflux disease 12/01/2014     HCC (hepatocellular carcinoma) (H) 1/22/2019     History of diabetic retinopathy 07/2018     HTN (hypertension)      HTN (hypertension) 11/20/2019     Hyperlipidemia      Liver cirrhosis secondary to ESTRADA (H)      Liver transplanted (H) 11/11/2019     Portal vein thrombosis 4/11/2019     Type II diabetes mellitus (H)      Patient Active Problem List   Diagnosis     Hepatic cirrhosis (H)     Type II diabetes mellitus (H)     Bipolar affective disorder in remission (H)     Esophageal varices (H)     Nonalcoholic steatohepatitis (ESTRADA)     Brow ptosis     Paralytic lagophthalmos of right upper eyelid     Erectile dysfunction due to diseases classified elsewhere     HCC (hepatocellular carcinoma) (H)     Equivocal stress echocardiogram     Type 2 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, unspecified whether long term insulin use (H)     Abnormal findings diagnostic imaging of heart and coronary circulation     Status post coronary angiogram     CAD (coronary artery disease)     Portal vein thrombosis     Liver transplant recipient (H)     Status post liver transplantation (H)     Immunosuppressed status (H)     Anemia due to blood loss, acute     Thrombocytopenia (H)     HTN (hypertension)     Diarrhea     Delirium     Malnutrition related to chronic disease (H)      Hypernatremia     Hypophosphatasia     JERONIMO (acute kidney injury) (H)     CKD (chronic kidney disease) stage 2, GFR 60-89 ml/min     Malnutrition (H)     Gastropathy     Anemia     Past Surgical History:   Procedure Laterality Date     COLONOSCOPY      2015     COLONOSCOPY N/A 12/6/2019    Procedure: COLONOSCOPY, WITH POLYPECTOMY AND BIOPSY;  Surgeon: Adam Morton MD;  Location:  GI     CV HEART CATHETERIZATION WITH POSSIBLE INTERVENTION N/A 2/26/2019    Procedure: CORS;  Surgeon: Jagdish Hoyt MD;  Location:  HEART CARDIAC CATH LAB     ESOPHAGOSCOPY, GASTROSCOPY, DUODENOSCOPY (EGD), COMBINED N/A 11/17/2016    Procedure: COMBINED ESOPHAGOSCOPY, GASTROSCOPY, DUODENOSCOPY (EGD);  Surgeon: Santi Rosas MD;  Location:  GI     ESOPHAGOSCOPY, GASTROSCOPY, DUODENOSCOPY (EGD), COMBINED N/A 11/17/2017    Procedure: COMBINED ESOPHAGOSCOPY, GASTROSCOPY, DUODENOSCOPY (EGD);  EGD;  Surgeon: Santi Rosas MD;  Location: [facility] GI     ESOPHAGOSCOPY, GASTROSCOPY, DUODENOSCOPY (EGD), COMBINED N/A 12/28/2018    Procedure: EGD;  Surgeon: Santi Rosas MD;  [facility]     ESOPHAGOSCOPY, GASTROSCOPY, DUODENOSCOPY (EGD), COMBINED N/A 12/6/2019    Procedure: ESOPHAGOGASTRODUODENOSCOPY, WITH BIOPSY;  Surgeon: Adam Morton MD;  Location:  GI     HEAD & NECK SURGERY      12/2017 at [facility].      IMPLANT GOLD WEIGHT EYELID Right 11/16/2017    Procedure: IMPLANT WEIGHT EYELID;  Right Upper Eyelid Weight, right tarsal strip lower eyelid;  Surgeon: Milana Malave MD;  [facility] OR     IR CHEMO EMBOLIZATION  1/22/2019     KNEE SURGERY Left      ORTHOPEDIC SURGERY       PAROTIDECTOMY, RADICAL NECK DISSECTION Right 11/2/2017    Procedure: PAROTIDECTOMY, RADICAL NECK DISSECTION;  Right Superfacial Parotidectomy , Facial nerve repair. with NIM facial nerve monitor.;  Surgeon: Asiya Morgan MD;  Location: [facility] OR     PICC INSERTION Left 11/06/2017    4fr SL BioFlo PICC, 44cm in the L  basilic vein w/ tip in the low SVC     RETURN LIVER TRANSPLANT N/A 2019    Procedure: Exploratory laparotomy, hematoma evacuation, abdominal washout;  Surgeon: Александр Ramos MD;  [facility]: [facility] OR     TRANSPLANT LIVER RECIPIENT  DONOR N/A 2019    Procedure: TRANSPLANT, LIVER, RECIPIENT,  DONOR;  Surgeon: Александр Ramos MD;  Location: [facility] OR     VASCULAR SURGERY       Social History     Socioeconomic History     Marital status:      Spouse name: Not on file     Number of children: Not on file     Years of education: Not on file     Highest education level: Not on file   Occupational History     Not on file   Social Needs     Financial resource strain: Not on file     Food insecurity:     Worry: Not on file     Inability: Not on file     Transportation needs:     Medical: Not on file     Non-medical: Not on file   Tobacco Use     Smoking status: Never Smoker     Smokeless tobacco: Former User     Types: Chew     Tobacco comment: 1 tin per week   Substance and Sexual Activity     Alcohol use: No     Alcohol/week: 0.0 standard drinks     Comment: quit 1996     Drug use: No     Sexual activity: Not Currently     Partners: Female     Birth control/protection: Condom   Lifestyle     Physical activity:     Days per week: Not on file     Minutes per session: Not on file     Stress: Not on file   Relationships     Social connections:     Talks on phone: Not on file     Gets together: Not on file     Attends Sabianism service: Not on file     Active member of club or organization: Not on file     Attends meetings of clubs or organizations: Not on file     Relationship status: Not on file     Intimate partner violence:     Fear of current or ex partner: Not on file     Emotionally abused: Not on file     Physically abused: Not on file     Forced sexual activity: Not on file   Other Topics Concern     Parent/sibling w/ CABG, MI or angioplasty before 65F 55M? Yes   Social History  Narrative     Not on file     Family History   Problem Relation Age of Onset     Prostate Cancer Maternal Grandfather      Substance Abuse Maternal Grandfather         Alcohol     Colon Cancer Father 60     Pancreatic Cancer Father 60     Prostate Cancer Father      Colorectal Cancer Father      Macular Degeneration Father      Cancer Father      Glaucoma Father      Skin Cancer Father      Colorectal Cancer Maternal Grandmother      Cancer Maternal Grandmother      Substance Abuse Maternal Grandmother         Alcohol     Colorectal Cancer Paternal Grandmother      Cancer Mother      Diabetes Mother          3/2016     Cerebrovascular Disease Mother         Passed away in Feb of this year, 80 years old.     Thyroid Disease Mother      Depression Mother      Asthma Sister         Had since birth     Thyroid Disease Sister      Depression Sister      Liver Disease No family hx of      Melanoma No family hx of      Lab Results   Component Value Date    AST 17 2020     Lab Results   Component Value Date    ALT 22 2020     No results found for: BILICONJ   Lab Results   Component Value Date    BILITOTAL 0.6 2020     Lab Results   Component Value Date    ALBUMIN 4.4 2020     Lab Results   Component Value Date    PROTTOTAL 8.0 2020      Lab Results   Component Value Date    ALKPHOS 124 2020     Last Comprehensive Metabolic Panel:  Sodium   Date Value Ref Range Status   2020 139 133 - 144 mmol/L Final     Potassium   Date Value Ref Range Status   2020 4.3 3.4 - 5.3 mmol/L Final     Chloride   Date Value Ref Range Status   2020 108 94 - 109 mmol/L Final     Carbon Dioxide   Date Value Ref Range Status   2020 26 20 - 32 mmol/L Final     Anion Gap   Date Value Ref Range Status   2020 5 3 - 14 mmol/L Final     Glucose   Date Value Ref Range Status   2020 203 (H) 70 - 99 mg/dL Final     Urea Nitrogen   Date Value Ref Range Status   2020 43 (H) 7  - 30 mg/dL Final     Creatinine   Date Value Ref Range Status   01/09/2020 2.01 (H) 0.66 - 1.25 mg/dL Final     GFR Estimate   Date Value Ref Range Status   01/09/2020 36 (L) >60 mL/min/[1.73_m2] Final     Comment:     Non  GFR Calc  Starting 12/18/2018, serum creatinine based estimated GFR (eGFR) will be   calculated using the Chronic Kidney Disease Epidemiology Collaboration   (CKD-EPI) equation.       Calcium   Date Value Ref Range Status   01/09/2020 9.4 8.5 - 10.1 mg/dL Final     Lab Results   Component Value Date    WBC 4.7 01/09/2020     Lab Results   Component Value Date    RBC 2.40 01/09/2020     Lab Results   Component Value Date    HGB 8.0 01/09/2020     Lab Results   Component Value Date    HCT 24.0 01/09/2020     No components found for: MCT  Lab Results   Component Value Date     01/09/2020     Lab Results   Component Value Date    MCH 33.3 01/09/2020     Lab Results   Component Value Date    MCHC 33.3 01/09/2020     Lab Results   Component Value Date    RDW 21.9 01/09/2020     Lab Results   Component Value Date    PLT 96 01/09/2020     A1c             5.1              12/02/2019  SUBJECTIVE FINDINGS:  A 54-year-old male returns to clinic for foot check.  He relates he is doing okay.  He has had his liver transplanted since we saw him last.  He relates his feet feel hot and he gets some neuropathy in the toes.  Relates no ulcers or sores since we have seen him last.  Relates he needs his toenails trimmed.  No other specific relieving or aggravating factors.  He does have diabetic shoes that he wears.       OBJECTIVE FINDINGS:  DP and PT 2/4 bilaterally.  He has dorsally contracted digits 2-5 bilaterally.  He has incurvated nails with some thickening and dystrophy 1-5 bilaterally to differing degrees.  There is no erythema, no drainage, no odor, no calor bilaterally.  Sharp/dull is intact with 5.07 [place]-Daya monofilament bilaterally.  Deep tendon reflexes are intact  bilaterally.  There are no gross tendon voids bilaterally.  He has a laterally deviated hallux bilaterally.       ASSESSMENT/PLAN:  Onychauxis bilaterally.  He is diabetic with peripheral neuropathy.  His protective sensation is intact.  He has some hallux valgus that is mild.  Diagnosis and treatment options discussed with him.  All the nails were reduced bilaterally upon consent.  He will return to clinic and see me in about 3 months.         Again, thank you for allowing me to participate in the care of your patient.      Sincerely,    Laimn Gonzalez DPM

## 2020-01-09 NOTE — PROGRESS NOTES
Past Medical History:   Diagnosis Date     Anemia 2013     Arthritis      BPH (benign prostatic hyperplasia)      CAD (coronary artery disease) 4/1/2019     Cholelithiasis      Conductive hearing loss 08/16/2017     Depressive disorder 1986    Suffer effects throughout life     Gastroesophageal reflux disease 12/01/2014     HCC (hepatocellular carcinoma) (H) 1/22/2019     History of diabetic retinopathy 07/2018     HTN (hypertension)      HTN (hypertension) 11/20/2019     Hyperlipidemia      Liver cirrhosis secondary to ESTRADA (H)      Liver transplanted (H) 11/11/2019     Portal vein thrombosis 4/11/2019     Type II diabetes mellitus (H)      Patient Active Problem List   Diagnosis     Hepatic cirrhosis (H)     Type II diabetes mellitus (H)     Bipolar affective disorder in remission (H)     Esophageal varices (H)     Nonalcoholic steatohepatitis (ESTRADA)     Brow ptosis     Paralytic lagophthalmos of right upper eyelid     Erectile dysfunction due to diseases classified elsewhere     HCC (hepatocellular carcinoma) (H)     Equivocal stress echocardiogram     Type 2 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, unspecified whether long term insulin use (H)     Abnormal findings diagnostic imaging of heart and coronary circulation     Status post coronary angiogram     CAD (coronary artery disease)     Portal vein thrombosis     Liver transplant recipient (H)     Status post liver transplantation (H)     Immunosuppressed status (H)     Anemia due to blood loss, acute     Thrombocytopenia (H)     HTN (hypertension)     Diarrhea     Delirium     Malnutrition related to chronic disease (H)     Hypernatremia     Hypophosphatasia     JERONIMO (acute kidney injury) (H)     CKD (chronic kidney disease) stage 2, GFR 60-89 ml/min     Malnutrition (H)     Gastropathy     Anemia     Past Surgical History:   Procedure Laterality Date     COLONOSCOPY      2015     COLONOSCOPY N/A 12/6/2019     Procedure: COLONOSCOPY, WITH POLYPECTOMY AND BIOPSY;  Surgeon: Adam Morton MD;  Location:  GI     CV HEART CATHETERIZATION WITH POSSIBLE INTERVENTION N/A 2019    Procedure: CORS;  Surgeon: Jagdish Hoyt MD;  Location:  HEART CARDIAC CATH LAB     ESOPHAGOSCOPY, GASTROSCOPY, DUODENOSCOPY (EGD), COMBINED N/A 2016    Procedure: COMBINED ESOPHAGOSCOPY, GASTROSCOPY, DUODENOSCOPY (EGD);  Surgeon: Santi Rosas MD;  Location:  GI     ESOPHAGOSCOPY, GASTROSCOPY, DUODENOSCOPY (EGD), COMBINED N/A 2017    Procedure: COMBINED ESOPHAGOSCOPY, GASTROSCOPY, DUODENOSCOPY (EGD);  EGD;  Surgeon: Santi Rosas MD;  Location:  GI     ESOPHAGOSCOPY, GASTROSCOPY, DUODENOSCOPY (EGD), COMBINED N/A 2018    Procedure: EGD;  Surgeon: Santi Rosas MD;  [facility]     ESOPHAGOSCOPY, GASTROSCOPY, DUODENOSCOPY (EGD), COMBINED N/A 2019    Procedure: ESOPHAGOGASTRODUODENOSCOPY, WITH BIOPSY;  Surgeon: Adam Morton MD;  Location:  GI     HEAD & NECK SURGERY      2017 at [facility].      IMPLANT GOLD WEIGHT EYELID Right 2017    Procedure: IMPLANT WEIGHT EYELID;  Right Upper Eyelid Weight, right tarsal strip lower eyelid;  Surgeon: Milana Malave MD;  [facility] OR     IR CHEMO EMBOLIZATION  2019     KNEE SURGERY Left      ORTHOPEDIC SURGERY       PAROTIDECTOMY, RADICAL NECK DISSECTION Right 2017    Procedure: PAROTIDECTOMY, RADICAL NECK DISSECTION;  Right Superfacial Parotidectomy , Facial nerve repair. with [facility] facial nerve monitor.;  Surgeon: Asiya Morgan MD;  Location: [facility] OR     PICC INSERTION Left 2017    4fr SL BioFlo PICC, 44cm in the L basilic vein w/ tip in the low SVC     RETURN LIVER TRANSPLANT N/A 2019    Procedure: Exploratory laparotomy, hematoma evacuation, abdominal washout;  Surgeon: Александр Ramos MD;  Location: [facility] OR     TRANSPLANT LIVER RECIPIENT  DONOR N/A 2019    Procedure: TRANSPLANT,  LIVER, RECIPIENT,  DONOR;  Surgeon: Александр Ramos MD;  Location: [facility] OR     VASCULAR SURGERY       Social History     Socioeconomic History     Marital status:      Spouse name: Not on file     Number of children: Not on file     Years of education: Not on file     Highest education level: Not on file   Occupational History     Not on file   Social Needs     Financial resource strain: Not on file     Food insecurity:     Worry: Not on file     Inability: Not on file     Transportation needs:     Medical: Not on file     Non-medical: Not on file   Tobacco Use     Smoking status: Never Smoker     Smokeless tobacco: Former User     Types: Chew     Tobacco comment: 1 tin per week   Substance and Sexual Activity     Alcohol use: No     Alcohol/week: 0.0 standard drinks     Comment: quit 1996     Drug use: No     Sexual activity: Not Currently     Partners: Female     Birth control/protection: Condom   Lifestyle     Physical activity:     Days per week: Not on file     Minutes per session: Not on file     Stress: Not on file   Relationships     Social connections:     Talks on phone: Not on file     Gets together: Not on file     Attends [employer] service: Not on file     Active member of club or organization: Not on file     Attends meetings of clubs or organizations: Not on file     Relationship status: Not on file     Intimate partner violence:     Fear of current or ex partner: Not on file     Emotionally abused: Not on file     Physically abused: Not on file     Forced sexual activity: Not on file   Other Topics Concern     Parent/sibling w/ CABG, MI or angioplasty before 65F 55M? Yes   Social History Narrative     Not on file     Family History   Problem Relation Age of Onset     Prostate Cancer Maternal Grandfather      Substance Abuse Maternal Grandfather         Alcohol     Colon Cancer Father 60     Pancreatic Cancer Father 60     Prostate Cancer Father      Colorectal Cancer Father       Macular Degeneration Father      Cancer Father      Glaucoma Father      Skin Cancer Father      Colorectal Cancer Maternal Grandmother      Cancer Maternal Grandmother      Substance Abuse Maternal Grandmother         Alcohol     Colorectal Cancer Paternal Grandmother      Cancer Mother      Diabetes Mother          3/2016     Cerebrovascular Disease Mother         Passed away in Feb of this year, 80 years old.     Thyroid Disease Mother      Depression Mother      Asthma Sister         Had since birth     Thyroid Disease Sister      Depression Sister      Liver Disease No family hx of      Melanoma No family hx of      Lab Results   Component Value Date    AST 17 2020     Lab Results   Component Value Date    ALT 22 2020     No results found for: BILICONJ   Lab Results   Component Value Date    BILITOTAL 0.6 2020     Lab Results   Component Value Date    ALBUMIN 4.4 2020     Lab Results   Component Value Date    PROTTOTAL 8.0 2020      Lab Results   Component Value Date    ALKPHOS 124 2020     Last Comprehensive Metabolic Panel:  Sodium   Date Value Ref Range Status   2020 139 133 - 144 mmol/L Final     Potassium   Date Value Ref Range Status   2020 4.3 3.4 - 5.3 mmol/L Final     Chloride   Date Value Ref Range Status   2020 108 94 - 109 mmol/L Final     Carbon Dioxide   Date Value Ref Range Status   2020 26 20 - 32 mmol/L Final     Anion Gap   Date Value Ref Range Status   2020 5 3 - 14 mmol/L Final     Glucose   Date Value Ref Range Status   2020 203 (H) 70 - 99 mg/dL Final     Urea Nitrogen   Date Value Ref Range Status   2020 43 (H) 7 - 30 mg/dL Final     Creatinine   Date Value Ref Range Status   2020 2.01 (H) 0.66 - 1.25 mg/dL Final     GFR Estimate   Date Value Ref Range Status   2020 36 (L) >60 mL/min/[1.73_m2] Final     Comment:     Non  GFR Calc  Starting 2018, serum creatinine based  estimated GFR (eGFR) will be   calculated using the Chronic Kidney Disease Epidemiology Collaboration   (CKD-EPI) equation.       Calcium   Date Value Ref Range Status   01/09/2020 9.4 8.5 - 10.1 mg/dL Final     Lab Results   Component Value Date    WBC 4.7 01/09/2020     Lab Results   Component Value Date    RBC 2.40 01/09/2020     Lab Results   Component Value Date    HGB 8.0 01/09/2020     Lab Results   Component Value Date    HCT 24.0 01/09/2020     No components found for: MCT  Lab Results   Component Value Date     01/09/2020     Lab Results   Component Value Date    MCH 33.3 01/09/2020     Lab Results   Component Value Date    MCHC 33.3 01/09/2020     Lab Results   Component Value Date    RDW 21.9 01/09/2020     Lab Results   Component Value Date    PLT 96 01/09/2020     A1c             5.1              12/02/2019  SUBJECTIVE FINDINGS:  A 54-year-old male returns to clinic for foot check.  He relates he is doing okay.  He has had his liver transplanted since we saw him last.  He relates his feet feel hot and he gets some neuropathy in the toes.  Relates no ulcers or sores since we have seen him last.  Relates he needs his toenails trimmed.  No other specific relieving or aggravating factors.  He does have diabetic shoes that he wears.       OBJECTIVE FINDINGS:  DP and PT 2/4 bilaterally.  He has dorsally contracted digits 2-5 bilaterally.  He has incurvated nails with some thickening and dystrophy 1-5 bilaterally to differing degrees.  There is no erythema, no drainage, no odor, no calor bilaterally.  Sharp/dull is intact with 5.07 [place] monofilament bilaterally.  Deep tendon reflexes are intact bilaterally.  There are no gross tendon voids bilaterally.  He has a laterally deviated hallux bilaterally.       ASSESSMENT/PLAN:  Onychauxis bilaterally.  He is diabetic with peripheral neuropathy.  His protective sensation is intact.  He has some hallux valgus that is mild.  Diagnosis and  treatment options discussed with him.  All the nails were reduced bilaterally upon consent.  He will return to clinic and see me in about 3 months.

## 2020-01-09 NOTE — LETTER
"[facility]:  PHQ-9 Screening Note  SITUATION/BACKGROUND                                                    Frandy Workman is a 54 year old male who completed the PHQ-9 assessment for depression.    Onset of symptoms: Post Surgery Nov 11, 2019  Trigger: surgery  Recent related events:   Prior history of suicide attempt or self harm: No  Risk Factors:   History of depression or mental illness: See notes  Medications reviewed:      ASSESSMENT      A. Are any of the following present?      Suicidal thoughts with a plan and means to carry out the plan?    Intent to harm others    Altered mental status: confusion, delusional, psychotic States \"its been tough\" challenging.   B. Are any of the following present?      Suicidal thoughts without a plan or means to carry out the plan    New onset of delusional ideas    Past inpatient admission for depression    New onset and recent change or addition of new medication No   C. Are any of the following present?      Previous suicide attempts    Depression interfering with ability to work or function    Loss of appetite and eating poorly    Abrupt cessation of drugs (OTC or RX), alcohol or caffeine    Drug or alcohol abuse No   D. Are several of the following present?      Difficulty concentrating    Difficulty sleeping    Reduced interest in sexual activity or impotency    Irregular or absent menstruation    No interest in activity    Change in interpersonal relationships    Increased use/abuse of alcohol or drugs    Pregnant or recent child birth    Recent major life change    History of depression   No       PLAN      Home Care Instructions:       Report the following to your PCP:       Seek emergency care immediately if any of the following occur:       BEHAVIORAL HEALTH TEAMS      [facility] [place]    [facility] Pager: [phone]    [place]  - Behavioral Health Team    Pager number: [phone]    Referral to Behavioral Health   [facility] SOWQ [[id number]}    RESOURCES    Declines additional resources for mental health at this time.     Abby Cespedes RN        Copyright 2016 [facility]

## 2020-01-09 NOTE — TELEPHONE ENCOUNTER
"[facility] Nurse working  With Pt RE depression; Zainab with OT with [facility], learning techniques on how to cope with depression, new ways of living post surgery, independence is very limited. Pre-surgery did not have problems, re incorporated skills in daily life. While in hospital a psychiatrist came and spoke w/him for 20 minutes put him on Zoloft in January 2020 50mg nightly. States mMainly anxiety and sleeping too much. Coping with poly-med requirements is \"challenging\". Extremely anxious about medication changes. Declines additional mental health resources at this time. Understands he can reach out at any time to request resources. Provider notified.   Abby Cespeeds RN on 1/9/2020 at 1:13 PM     "

## 2020-01-09 NOTE — LETTER
1/9/2020     RE: Frandy Workman  [address]  [facility] [address]     Dear Colleague,    Thank you for referring your patient, Frandy Workman, to the [facility] ENDOCRINOLOGY at [facility]. Please see a copy of my visit note below.    [facility]  Endocrinology  Tish Toscano PA-C  01/09/2020      Chief Complaint:   Diabetes    History of Present Illness:   Frandy Workman is a 54 year old male with a history of liver cirrhosis secondary to ESTRADA, HCC 2018, S/p liver transplant 11/11/19 complicated by hematoma evacuation on POD 1, coronary artery disease, type 2 diabetes, hyperlipidemia, and hypertension who presents with his PCA Gabrielle for follow-up.     He was admitted to [facility] 12/2-12/18 with nausea, vomiting, weakness, confusion and poor appetite x 1 week and labs revealing JERONIMO. Underwent upper endoscopy and colonoscopy on 12/6 and found to have mild gastropathy; duodenal, stomach, and colon bx + mycophenolate toxicity. Mycophenolate stopped as of evening 12/10 and started on Imuran 150mg daily per Transplant team. Last tacro level subtherapeutic on 12/16 so dose increased.  Hemoglobin A1c was 5.1% on 12/2/19. He has been diagnosed with type 2 diabetes since age 29 and has required insulin since 2001. He was on Tresiba 55 units, Metformin ER 500mg w/ supper, and Farxiga 10mg daily prior to transplant. He was recommended to continue on Lantus 14 units daily, carb coverage 1u: 10g CHO, MSSI, and follow-up with endocrinology at time of discharge.     He had a virtual visit with Lanie Atkinson on 12/19/19 where they discussed that he was resumed on Prednisone 5 mg daily on 12/16 and was on cycled tube feeds with NPH insulin while at [facility]. He reported compliance with the diabetes regimen recommended at discharge. She instructed Miller to increase to 20 units of Lantus qAM, increase to 1 unit per 8 g CHO with meals, beginning with lunch. He was to continue moderate intensity  "sliding scale. He saw [facility] on 12/31/19 with BG still 150-200 in the AM, so increased Lantus to 28 units daily.     He reviews that he first met with Dr. Wilcox 3 years ago to try to fix his A1c, most recently onTresiba 55 units, Metformin ER 500mg w/ supper, and Farxiga 10mg daily before the transplant. He was also taking Novolog 1 unit for every 4g of carbs. Since his liver transplant in November 2019, his diabetes has been difficult to control as they were pushing him to eat afterwards for recovery. He was taken off metformin and Farxiga and switched him from Tresiba to Lantus.     Today:  He is now on Basaglar 28 units in the morning, Novolog sliding scale (1 unit for every 50 greater than 140 before meals, no bedtime scale) and carb coverage (1 unit of every 8g carbs). He does not have glucagon at home and wonders if he could have this to keep on hand.     He finds it difficult to manage his diet, as his liver transplant team wants him to be eating 8109-6943 calories a day with 100g of protein and do not care how many carbs he eats, which he needs to watch to manage his diabetes. He has not been on a feeding tube since [facility] discharge on 12/18/19.     In regards to the low of 50mg/dL on Sunday (1/5), he was vomiting and constipated and went low after he dosed for a full plate of spaghetti, but had emesis after eating half.  He did actually finish the other half, but felt low about an hour later. He did not feel like eating lunch and waiting until dinner 5 hours later. He was eating spaghetti and meatballs, vomited, then finished the meal. He took enough insulin for 70g of carbs, but thinks he only ingested around 20g. His PCA notes that he will often dose before meals and not finish them or more commonly dose for both a meal that he is eating now and a snack he will eat in two hours to avoid another poke later. He reports that he will always eat breakfast but his lunch or dinner is \"hit or miss.\" " "He also drinks Boost as needed throughout the day.      He has also been having hot flashes and night sweats with his body temperature gradually increasing throughout the day. He wonders if this is from glycemic control. He does nap often during the day and reports some insomnia at night. He notes struggling with his mental health with getting his \"life back\" but defers seeing health psychology.     He wonders if he is okay to continue with his OT given recent blood work and if he should ask his liver specialist about this.    Blood Glucose Monitoring:  We reviewed glucometer data together. He did not bring in his actual glucometer but brought in recorded data logs that also included insulin doses.      He is faithfully checking BG and giving Novolog 3 x/day.      Last week:  Low of 50 in the evening on 1/5/20 and high of 206 (morning before breakfast)    Fasting blood glucose measures in the AM:   183, 178, 185, 143, 198, 178, 135, 117      Diabetes monitoring and complications:  CAD: Yes  Last eye exam results: 10/9/19 with michelle Gale nonproliferative diabetic retinopathy of both eyes   Microalbuminuria: 102.4 on 12/2/19   Neuropathy: Yes  HTN: Yes  On Statin: Yes, Crestor   On Aspirin: Yes  Depression: Yes  Erectile dysfunction: Yes    Patient Supplied Answers To Diabetic Questionnaire  including 1/8/2020   1. Since your last visit to our clinic (or if this your first visit, since you last saw your primary care provider), have you experienced any of the following symptoms that may be related to low blood sugars? Sweating that wasn't due to exertion, Shaking or trembling, Lightheadedness   2. Since your last visit to our clinic (or if this your first visit, since you last saw your primary care provider), have you experienced any of the following symptoms that may be related to prolonged high blood sugars? Fatigue   4. Do you have any female family members who have had heart attacks or strokes before " age 60 or male relatives who have had heart attacks or strokes before age 50? Yes   5. Do you have any family members who have had complications from diabetes such as kidney disease, heart disease or strokes, retinopathy (a vision problem), or amputations? Yes   6. Who do you live with?  Self   Little interest or pleasure in doing things? More than half the days   Feeling down, depressed, or hopeless? Nearly everyday      Review of Systems:   Pertinent items are noted in HPI.  All other systems are negative.    Active Medications:      Acetaminophen (TYLENOL) 325 MG CAPS, Take 325-650 mg by mouth every 4 hours as needed (pain, fever), Disp: 100 capsule, Rfl: 3     alpha-lipoic acid 600 MG capsule, Take 1 capsule (600 mg) by mouth daily, Disp: 90 capsule, Rfl: 3     Artificial Tear Solution (SM ARTIFICIAL TEARS) SOLN, Place 1 drop into the right eye every hour as needed (dry eyes) Apply at least 4 times daily and as needed for dry eye, Disp: 1 Bottle, Rfl: 3     aspirin (ASA) 325 MG EC tablet, Take 1 tablet (325 mg) by mouth daily, Disp: 30 tablet, Rfl: 3     azaTHIOprine (IMURAN) 50 MG tablet, Take 3 tablets (150 mg) by mouth daily, Disp: 90 tablet, Rfl: 3     carvedilol (COREG) 12.5 MG tablet, Take 1 tablet (12.5 mg) by mouth 2 times daily (with meals), Disp: 60 tablet, Rfl: 0     econazole nitrate 1 % external cream, Apply topically daily To feet and toenails., Disp: 85 g, Rfl: 0     ferrous sulfate (FEROSUL) 325 (65 Fe) MG tablet, Take 1 tablet (325 mg) by mouth 3 times daily (with meals), Disp: 90 tablet, Rfl: 3     insulin aspart (NOVOLOG PEN) 100 UNIT/ML pen, Dose = 1 unit per 8 grams of carbohydrate with meals and snacks, administer within 30 minutes of start of meal, Disp: 3 mL, Rfl: 1     insulin aspart (NOVOLOG PEN) 100 UNIT/ML pen, Give with meals and snacks Do Not give Correction Insulin if Pre-Meal BG less than 140.  For Pre-Meal  - 189 give 1 unit.  For Pre-Meal  - 239 give 2 units.  For  Pre-Meal  - 289 give 3 units.  For Pre-Meal  - 339 give 4 units.  For Pre-Meal - 399 give 5 units.  For Pre-Meal -449 give 6 units For Pre-Meal BG greater than or equal to 450 give 7 units.  To be given with prandial insulin, and based on pre-meal blood glucose.   If given at mealtime, administer within 30 minutes of start of meal, Disp: 3 mL, Rfl: 1     insulin glargine (LANTUS PEN) 100 UNIT/ML pen, Inject 24 Units Subcutaneous every morning, Disp: 3 mL, Rfl: 3     Lidocaine (LIDOCARE) 4 % Patch, Place 1 patch onto the skin every 24 hours To prevent lidocaine toxicity, patient should be patch free for 12 hrs daily., Disp: , Rfl:      loperamide (IMODIUM) 2 MG capsule, Take 1 capsule (2 mg) by mouth 4 times daily as needed for diarrhea, Disp: 90 capsule, Rfl: 1     magnesium oxide (MAG-OX) 400 MG tablet, Take 1 tablet (400 mg) by mouth 2 times daily, Disp: 60 tablet, Rfl: 11     Multiple Vitamin (THERAVITE PO), Take 1 tablet by mouth every morning , Disp: , Rfl:      omeprazole (PRILOSEC) 40 MG DR capsule, Take 1 capsule (40 mg) by mouth daily, Disp: 90 capsule, Rfl: 2     ondansetron (ZOFRAN) 4 MG tablet, Take 1 tablet (4 mg) by mouth every 6 hours as needed for nausea or vomiting, Disp: 30 tablet, Rfl: 3     predniSONE (DELTASONE) 5 MG tablet, Take 1 tablet (5 mg) by mouth daily, Disp: 30 tablet, Rfl: 0     prochlorperazine (COMPAZINE) 5 MG tablet, Take 1 tablet (5 mg) by mouth every 6 hours as needed for nausea or vomiting (use after Zofran), Disp: , Rfl:      rosuvastatin (CRESTOR) 5 MG tablet, Take 1 tablet (5 mg) by mouth daily, Disp: 30 tablet, Rfl: 3     sertraline (ZOLOFT) 50 MG tablet, Take 1 tablet (50 mg) by mouth daily, Disp: 30 tablet, Rfl: 1     simethicone (MYLICON) 80 MG chewable tablet, Take 1 tablet (80 mg) by mouth every 6 hours as needed for cramping, Disp: , Rfl:      sodium bicarbonate 650 MG tablet, Take 1 tablet (650 mg) by mouth 2 times daily, Disp: 60 tablet, Rfl:  0     sodium chloride (OCEAN) 0.65 % nasal spray, Spray 1 spray into both nostrils every hour as needed for congestion, Disp: , Rfl:      sulfamethoxazole-trimethoprim (BACTRIM/SEPTRA) 400-80 MG tablet, Take 1 tablet by mouth daily, Disp: 30 tablet, Rfl: 11     tacrolimus (GENERIC EQUIVALENT) 1 MG capsule, Take 5 capsules (5 mg) by mouth 2 times daily, Disp: 300 capsule, Rfl: 3     tamsulosin (FLOMAX) 0.4 MG capsule, TAKE 1 CAPSULE(0.4 MG) BY MOUTH DAILY, Disp: 90 capsule, Rfl: 3     tenofovir (VIREAD) 300 MG tablet, Take 1 tablet (300 mg) by mouth daily, Disp: 30 tablet, Rfl: 11     valGANciclovir (VALCYTE) 450 MG tablet, Take 1 tablet (450 mg) by mouth daily, Disp: 30 tablet, Rfl: 5     vitamin B-12 (CYANOCOBALAMIN) 1000 MCG tablet, Take 1 tablet (1,000 mcg) by mouth daily, Disp: 30 tablet, Rfl: 0     vitamin D3 (CHOLECALCIFEROL) 2000 units (50 mcg) tablet, Take 1 tablet (2,000 Units) by mouth daily, Disp: 30 tablet, Rfl: 0     glucose (BD GLUCOSE) 4 g chewable tablet, Take 1 tablet by mouth every hour as needed for low blood sugar (Patient not taking: Reported on 12/24/2019), Disp: 60 tablet, Rfl: 1     mirtazapine (REMERON) 15 MG tablet, Take 1 tablet (15 mg) by mouth every other day for 7 days Pt weaning off, Disp: 7 tablet, Rfl: 0    Allergies:   Codeine and Seasonal allergies      Past Medical History:  Anemia   Arthritis    BPH   Coronary artery disease   Cholelithiasis    Depression   GERD   HCC  Diabetic retinopathy   Conducive hearing loss   Hypertension   Hyperlipidemia   Liver cirrhosis secondary to ESTRADA   Liver transplant   Portal vein thrombosis   Type 2 diabetes   Bipolar affective disorder in remission   Brow ptosis   Erectile dysfunction   Thrombocytopenia   Esophageal varices   CKD stage 2   JERONIMO    Malnutrition      Past Surgical History:  Colonoscopy x2   Heart cath with possible intervention, 2019   Esophagoscopy, gastroscopy, duodenoscopy, combined x4  Head and neck surgery   Implant gold  "weight eyelid, right   Chemo embolization   Knee surgery, left   Orthopedic surgery   Parotidectomy, radical neck dissection, right   PICC insertion, left   Return liver transplant, 2019  Transplant liver,  donor, 201   Vascular surgery     Family History:   Maternal grandfather - prostate cancer, alcohol abuse   Father - colon cancer, pancreatic cancer, prostate cancer, colorectal cancer, macular degeneration, glacuoma, skin cancer   Maternal grandmother - colorectal cancer, alcohol abuse   Paternal grandmother - colorectal cancer   Mother - cancer, diabetes, cerebrovascular disease  ( age 80), thyroid disease, depression   Sister - asthma, thyroid disease, depression       Social History:   The patient was accompanied to the appointment by: GERARDO Anthony   Smoking Status: Never smoker    Smokeless Tobacco: Former user of chew, 1 tin per week   Alcohol Use: No, quit 1996      Physical Exam:   /65   Pulse 86   Ht 1.765 m (5' 9.5\")   Wt 73.9 kg (162 lb 14.4 oz)   BMI 23.71 kg/m        Wt Readings from Last 10 Encounters:   20 73.9 kg (162 lb 14.4 oz)   19 75.9 kg (167 lb 6.4 oz)   19 73.6 kg (162 lb 4.8 oz)   12/10/19 76.7 kg (169 lb 1.6 oz)   19 74.8 kg (165 lb)   19 74.9 kg (165 lb 3.2 oz)   19 78.3 kg (172 lb 11.2 oz)   19 76.9 kg (169 lb 9.6 oz)   19 80.2 kg (176 lb 12.8 oz)   10/28/19 78 kg (172 lb)      General: Pleasant, somewhat frail appearing male in NAD. Accompanied by Gabrielle.  Both have many questions about insulin use.    Psych:  Mood is \"good,\" affect is appropriate.  Thought form and content are fluid and coherent.    HEENT: Eyes and sclera are clear. Extraocular movements are grossly intact without proptosis.  Nares are patent, mucous membranes moist.  Neck: No masses or JVD are noted.    Resp: Easy and unlabored breathing.   Neuro: Alert and oriented, communicating clearly.   Ext: No swelling or edema         Data:  Lab Results "   Component Value Date     01/09/2020    POTASSIUM 4.3 01/09/2020    CHLORIDE 108 01/09/2020    CO2 26 01/09/2020    ANIONGAP 5 01/09/2020     (H) 01/09/2020    BUN 43 (H) 01/09/2020    CR 2.01 (H) 01/09/2020    DEBORAH 9.4 01/09/2020     Lab Results   Component Value Date    GFRESTIMATED 36 (L) 01/09/2020    GFRESTIMATED 37 (L) 01/06/2020    GFRESTIMATED 38 (L) 01/02/2020    GFRESTBLACK 42 (L) 01/09/2020    GFRESTBLACK 43 (L) 01/06/2020    GFRESTBLACK 44 (L) 01/02/2020      Lab Results   Component Value Date    MICROL 60 12/02/2019    UMALCR 102.40 (H) 12/02/2019      Lab Results   Component Value Date    A1C 6.6 (H) 08/08/2018    A1C 6.5 (H) 06/09/2017    A1C 7.8 (H) 10/25/2016    HEMOGLOBINA1 5.1 12/02/2019    HEMOGLOBINA1 5.4 08/14/2019    HEMOGLOBINA1 6.1 (A) 02/11/2019    HEMOGLOBINA1 8.1 (A) 04/11/2018    HEMOGLOBINA1 7.6 (A) 10/16/2017     Lab Results   Component Value Date    CHOL 131 02/04/2019    CHOL 133 08/08/2018    TRIG 117 02/04/2019    TRIG 172 (H) 08/08/2018    HDL 26 (L) 02/04/2019    HDL 30 (L) 08/08/2018    LDL 81 02/04/2019    LDL 68 08/08/2018    NHDL 105 02/04/2019    NHDL 103 08/08/2018     Assessment and Plan:   Type 2 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, unspecified whether long term insulin use (H)  Miller is a 54 year old male with complex medical history including liver disease and nephropathy as well as type 2 diabetes, recently exacerbated with steroid use. Currently his doses seem to be working quite well and I do not think they need to be changed, however he does have a lot of confusion surrounding what is carb coverage, how the sliding scale works, how often this can be given, and when he can resume other medications, as well as how his regimen should be modified for rehab exercises. We spent an extensive amount of time, over 30 minutes, in education. I am encouraging him to see diabetes education in the coming weeks,  which he is agreeable to  and I will see him back in one month due to his dynamic medical condition. He does need to establish care with a new endocrinologist since Dr. Wilcox left.    - TSH with free T4 reflex  - Glucagon 3 MG/DOSE POWD  Dispense: 1 each; Refill: 1  - insulin glargine (BASAGLAR KWIKPEN) 100 UNIT/ML pen  Dispense: 30 mL; Refill: 3  - insulin aspart (NOVOLOG PEN) 100 UNIT/ML pen  Dispense: 3 mL; Refill: 1      Follow-up: Return in about 1 month (around 2/9/2020).       I think your insulin doses are appropriate and do not need to change.      Please continue:   - Basaglar 28 units once daily   - Novolog 1 unit: 8 g carbohydrate   - 1 unit 50 mg/dl  >140 as you have been.    We expect these doses will decrease when your Prednisone dose decreases. Please advise us when this happens and watch BG closely.    If you increase your physical activity, such as rehab - you will need less insulin.  Please subtract 2 units from your dose prior to any rehab session to prevent low blood sugar interrupting work out.    If you feel nausea prior to a meal, please hold dose until after eating. Take as soon as you see you will keep the meal down.  You may need to correct more at the next meal.     - If you feel low blood sugar at any time, please CHECK.  If <70, give 15 grams of carbohydrate and recheck in 15 minutes.          >50% of 40 minute visit spent in face to face counseling, education and coordination of care related to options for better glycemic control as well as preventing, detecting, and treating hypoglycemia.      It is my privilege to be involved in the care of the above patient.     Tish Toscano PA-C, MPAS  [facility]  Diabetes, Endocrinology, and Metabolism  [phone] Appointments/Nurse  [phone] Fax  [phone] pager  [phone] nurse line             Scribe Disclosure:  I, Prachi Lainez, am serving as a scribe to document services personally performed by Tish SKINNER  CLAUDIA Toscano at this visit, based upon the provider's statements to me. All documentation has been reviewed by the aforementioned provider prior to being entered into the official medical record.     Portions of this medical record were completed by a scribe. UPON MY REVIEW AND AUTHENTICATION BY ELECTRONIC SIGNATURE, this confirms (a) I performed the applicable clinical services, and (b) the record is accurate.      Again, thank you for allowing me to participate in the care of your patient.      Sincerely,    Tish Toscano PA-C

## 2020-01-10 ENCOUNTER — TELEPHONE (OUTPATIENT)
Dept: ENDOCRINOLOGY | Facility: CLINIC | Age: 55
End: 2020-01-10

## 2020-01-10 NOTE — TELEPHONE ENCOUNTER
Tish Toscano PA-C  You 21 hours ago (3:38 PM)      Thank you for meeting with him and for letting me know!    Routing comment

## 2020-01-10 NOTE — TELEPHONE ENCOUNTER
[facility] Call Center    Phone Message    May a detailed message be left on voicemail: yes    Reason for Call: Medication Question or concern regarding medication   Prescription Clarification  Name of Medication: insulin aspart (NOVOLOG PEN) 100 UNIT/ML pen  Prescribing Provider: Dorcas   Pharmacy:   [facility] - [place], [place] - [address]       What on the order needs clarification? Megan, pharmacist from  Specialty pharmacy callled and would like to know pt's intake per day. Please call back Megan Thanks.          Action Taken: Message routed to:  [facility] ([facility] Nette

## 2020-01-13 ENCOUNTER — INFUSION THERAPY VISIT (OUTPATIENT)
Dept: INFUSION THERAPY | Facility: CLINIC | Age: 55
End: 2020-01-13
Attending: SURGERY
Payer: MEDICARE

## 2020-01-13 ENCOUNTER — OFFICE VISIT (OUTPATIENT)
Dept: TRANSPLANT | Facility: CLINIC | Age: 55
End: 2020-01-13
Attending: SURGERY
Payer: MEDICARE

## 2020-01-13 ENCOUNTER — MEDICAL CORRESPONDENCE (OUTPATIENT)
Dept: HEALTH INFORMATION MANAGEMENT | Facility: CLINIC | Age: 55
End: 2020-01-13

## 2020-01-13 ENCOUNTER — OFFICE VISIT (OUTPATIENT)
Dept: PHARMACY | Facility: CLINIC | Age: 55
End: 2020-01-13
Payer: COMMERCIAL

## 2020-01-13 VITALS
SYSTOLIC BLOOD PRESSURE: 108 MMHG | BODY MASS INDEX: 23.86 KG/M2 | HEART RATE: 82 BPM | OXYGEN SATURATION: 98 % | TEMPERATURE: 97.5 F | DIASTOLIC BLOOD PRESSURE: 61 MMHG | WEIGHT: 163.9 LBS

## 2020-01-13 VITALS — DIASTOLIC BLOOD PRESSURE: 62 MMHG | SYSTOLIC BLOOD PRESSURE: 132 MMHG | HEART RATE: 90 BPM

## 2020-01-13 DIAGNOSIS — E78.2 MIXED HYPERLIPIDEMIA: ICD-10-CM

## 2020-01-13 DIAGNOSIS — Z94.4 LIVER TRANSPLANT RECIPIENT (H): ICD-10-CM

## 2020-01-13 DIAGNOSIS — N40.0 BENIGN PROSTATIC HYPERPLASIA WITHOUT LOWER URINARY TRACT SYMPTOMS: ICD-10-CM

## 2020-01-13 DIAGNOSIS — R11.2 NAUSEA AND VOMITING, INTRACTABILITY OF VOMITING NOT SPECIFIED, UNSPECIFIED VOMITING TYPE: ICD-10-CM

## 2020-01-13 DIAGNOSIS — K21.9 GASTROESOPHAGEAL REFLUX DISEASE, ESOPHAGITIS PRESENCE NOT SPECIFIED: ICD-10-CM

## 2020-01-13 DIAGNOSIS — N18.2 CKD (CHRONIC KIDNEY DISEASE) STAGE 2, GFR 60-89 ML/MIN: Primary | ICD-10-CM

## 2020-01-13 DIAGNOSIS — E11.3293 TYPE 2 DIABETES MELLITUS WITH MILD NONPROLIFERATIVE RETINOPATHY OF BOTH EYES WITHOUT MACULAR EDEMA, UNSPECIFIED WHETHER LONG TERM INSULIN USE (H): ICD-10-CM

## 2020-01-13 DIAGNOSIS — Z94.4 LIVER TRANSPLANT RECIPIENT (H): Primary | ICD-10-CM

## 2020-01-13 DIAGNOSIS — K59.00 CONSTIPATION, UNSPECIFIED CONSTIPATION TYPE: ICD-10-CM

## 2020-01-13 DIAGNOSIS — I10 BENIGN ESSENTIAL HYPERTENSION: ICD-10-CM

## 2020-01-13 DIAGNOSIS — N17.9 AKI (ACUTE KIDNEY INJURY) (H): ICD-10-CM

## 2020-01-13 DIAGNOSIS — Z94.4 STATUS POST LIVER TRANSPLANTATION (H): Primary | ICD-10-CM

## 2020-01-13 LAB
ALBUMIN SERPL-MCNC: 4.2 G/DL (ref 3.4–5)
ALP SERPL-CCNC: 118 U/L (ref 40–150)
ALT SERPL W P-5'-P-CCNC: 22 U/L (ref 0–70)
ANION GAP SERPL CALCULATED.3IONS-SCNC: 6 MMOL/L (ref 3–14)
AST SERPL W P-5'-P-CCNC: 18 U/L (ref 0–45)
BILIRUB DIRECT SERPL-MCNC: 0.1 MG/DL (ref 0–0.2)
BILIRUB SERPL-MCNC: 0.5 MG/DL (ref 0.2–1.3)
BUN SERPL-MCNC: 41 MG/DL (ref 7–30)
CALCIUM SERPL-MCNC: 9.1 MG/DL (ref 8.5–10.1)
CHLORIDE SERPL-SCNC: 108 MMOL/L (ref 94–109)
CO2 SERPL-SCNC: 25 MMOL/L (ref 20–32)
CREAT SERPL-MCNC: 2.14 MG/DL (ref 0.66–1.25)
ERYTHROCYTE [DISTWIDTH] IN BLOOD BY AUTOMATED COUNT: 21.9 % (ref 10–15)
GFR SERPL CREATININE-BSD FRML MDRD: 33 ML/MIN/{1.73_M2}
GLUCOSE SERPL-MCNC: 177 MG/DL (ref 70–99)
HCT VFR BLD AUTO: 21.5 % (ref 40–53)
HGB BLD-MCNC: 7.2 G/DL (ref 13.3–17.7)
MAGNESIUM SERPL-MCNC: 2.4 MG/DL (ref 1.6–2.3)
MCH RBC QN AUTO: 33 PG (ref 26.5–33)
MCHC RBC AUTO-ENTMCNC: 33.5 G/DL (ref 31.5–36.5)
MCV RBC AUTO: 99 FL (ref 78–100)
PHOSPHATE SERPL-MCNC: 3.3 MG/DL (ref 2.5–4.5)
PLATELET # BLD AUTO: 108 10E9/L (ref 150–450)
POTASSIUM SERPL-SCNC: 4.9 MMOL/L (ref 3.4–5.3)
PROT SERPL-MCNC: 7.6 G/DL (ref 6.8–8.8)
RBC # BLD AUTO: 2.18 10E12/L (ref 4.4–5.9)
SODIUM SERPL-SCNC: 139 MMOL/L (ref 133–144)
TACROLIMUS BLD-MCNC: 6.5 UG/L (ref 5–15)
TME LAST DOSE: NORMAL H
WBC # BLD AUTO: 3.5 10E9/L (ref 4–11)

## 2020-01-13 PROCEDURE — 80197 ASSAY OF TACROLIMUS: CPT | Performed by: SURGERY

## 2020-01-13 PROCEDURE — 85027 COMPLETE CBC AUTOMATED: CPT | Performed by: SURGERY

## 2020-01-13 PROCEDURE — 80076 HEPATIC FUNCTION PANEL: CPT | Performed by: SURGERY

## 2020-01-13 PROCEDURE — 99607 MTMS BY PHARM ADDL 15 MIN: CPT | Performed by: PHARMACIST

## 2020-01-13 PROCEDURE — 83735 ASSAY OF MAGNESIUM: CPT | Performed by: SURGERY

## 2020-01-13 PROCEDURE — 84100 ASSAY OF PHOSPHORUS: CPT | Performed by: SURGERY

## 2020-01-13 PROCEDURE — 25800030 ZZH RX IP 258 OP 636: Mod: ZF | Performed by: SURGERY

## 2020-01-13 PROCEDURE — 99605 MTMS BY PHARM NP 15 MIN: CPT | Performed by: PHARMACIST

## 2020-01-13 PROCEDURE — 80048 BASIC METABOLIC PNL TOTAL CA: CPT | Performed by: SURGERY

## 2020-01-13 PROCEDURE — 96360 HYDRATION IV INFUSION INIT: CPT

## 2020-01-13 PROCEDURE — G0463 HOSPITAL OUTPT CLINIC VISIT: HCPCS | Mod: ZF

## 2020-01-13 RX ORDER — AZATHIOPRINE 50 MG/1
100 TABLET ORAL DAILY
Qty: 60 TABLET | Refills: 0 | Status: SHIPPED | OUTPATIENT
Start: 2020-01-13 | End: 2020-02-14

## 2020-01-13 RX ORDER — SODIUM BICARBONATE 650 MG/1
650 TABLET ORAL 2 TIMES DAILY
Qty: 60 TABLET | Refills: 3 | OUTPATIENT
Start: 2020-01-13

## 2020-01-13 RX ORDER — PREDNISONE 5 MG/1
5 TABLET ORAL DAILY
Qty: 30 TABLET | Refills: 3 | Status: SHIPPED | OUTPATIENT
Start: 2020-01-13 | End: 2020-02-11

## 2020-01-13 RX ADMIN — SODIUM CHLORIDE 1000 ML: 9 INJECTION, SOLUTION INTRAVENOUS at 15:57

## 2020-01-13 ASSESSMENT — PAIN SCALES - GENERAL: PAINLEVEL: SEVERE PAIN (6)

## 2020-01-13 NOTE — Clinical Note
GIOVANNI Cornejo, I moved patient's insulin to after meals because he has been vomiting frequently through the day leading to low blood sugars. Although not optimal dosing, may be necessary until the root cause of his nausea is solved.

## 2020-01-13 NOTE — PROGRESS NOTES
Transplant Surgery  Clinic Note  01/13/2020    Assessment & Plan:   Frandy Workman is a 54 year old male with PMHx significant for cirrhosis secondary to ESTRADA diagnosed in 2013, HCC 2018, HTN, HLD, GERD, BPH and DMII. S/p liver transplant 11/11/19 complicated by hematoma evacuation on POD 1. He presents to the clinic today due to nausea and emesis. His emesis is likely due to medications but given his diabetes history, it may be related to gastroparesis.    -will obtain gastric emptying study today  -will decreased Imuran to 100 mg  -will check with insurance about starting Envarsus   -will give patient 1 liter of IV fluid today and schedule him for weekly IV fluids  -will consider starting reglan pending gastric emptying study    SERA/Fellow/Resident Provider: Gonzalo Rodriguez MD    Faculty: Александр Ramos M.D.    I have reviewed history, examined patient and discussed plan with the fellow/resident/SERA.  I concur with the findings in this note.    Immunosuppressive regimen, management and long term risks discussed in detail. Changes, when applicable made as per orders.      Total time: 15 min  Counseling time: 10 min               _________________________________________________________________  Transplant History:   11/11/2019 (Liver), Postoperative day: 63     Interval History: History is obtained from the patient    The patient presents to clinic and he continues to complain of nausea and emesis. The nausea episodes are not related to the timing of his medications. He is able to keep some food down. His blood glucose ranges from . He denies any fevers, chills, or abdominal pain    ROS:   A 10-point review of systems was negative except as noted above.    Meds:    Aflibercept  2 mg Intravitreal Q28 Days     ranibizumab  0.5 mg Intravitreal Q28 Days       Physical Exam:     Admit Weight: 74.3 kg (163 lb 14.4 oz)    Current vitals:   /61 (BP Location: Left arm, Patient Position: Sitting, Cuff Size:  Adult Regular)   Pulse 82   Temp 97.5  F (36.4  C) (Oral)   Wt 74.3 kg (163 lb 14.4 oz)   SpO2 98%   BMI 23.86 kg/m      Vital sign ranges:    Temp:  [97.5  F (36.4  C)] 97.5  F (36.4  C)  Pulse:  [82] 82  BP: (108)/(61) 108/61  SpO2:  [98 %] 98 %    General Appearance: in no apparent distress.   Skin: Warm, perfused  Heart: RRR  Lungs: non-labored breathing on room air  Abdomen: The abdomen is soft, non-tender, non-distended  Extremities: edema: absent, strength normal  Neurologic: awake, alert and oriented. Tremor absent..

## 2020-01-13 NOTE — NURSING NOTE
Chief Complaint   Patient presents with     RECHECK     Liver post op     Blood pressure 108/61, pulse 82, temperature 97.5  F (36.4  C), temperature source Oral, weight 74.3 kg (163 lb 14.4 oz), SpO2 98 %.    Yvonne Moore on 1/13/2020 at 1:11 PM

## 2020-01-13 NOTE — PATIENT INSTRUCTIONS
Recommendations from today's MTM visit:                                                      1. Start Miralax 17g (capful) once daily for at least a week. See if taking this every day improves your constipation.     2. Make the gastric emptying appointment at the  today.     3. Per the transplant team reduce your Azathioprine (Imuran) to 100mg (2 x 50mg tablets) once daily.     4. We are moving your Valcyte and your Aspirin to the evening to reduce your morning doses.    5. You can give your insulin after your meal to make sure you are able to keep down your food.     It was great to speak with you today.  I value your experience and would be very thankful for your time with providing feedback on our clinic survey. You may receive a survey via email or text message in the next few days.     Next MTM visit: 2/11 at 12PM.     To schedule another MTM appointment, please call the clinic directly or you may call the MTM scheduling line at [phone] or toll-free at [phone].     My Clinical Pharmacist's contact information:                                                      It was a pleasure talking with you today!  Please feel free to contact me with any questions or concerns you have.      Donald Bradley, PharmD  [facility] Pharmacist    Phone: [phone]

## 2020-01-13 NOTE — PROGRESS NOTES
SUBJECTIVE/OBJECTIVE:                Frandy Workman is a 54 year old male coming in for a follow up MTM visit.  He was referred post txp.     Chief Complaint: general med review.  Pt's main concern is his frequent nausea and vomiting post eating. He has had a few BG lows due to throwing up his meal after taking insulin. The transplant team reduced Azathioprine to 100mg daily today and would like patient to set up a gastric emptying appt. Additionally he feels like he is taking too many medications in the morning and would like to spread them out a bit better.     Allergies/ADRs: Reviewed in Epic  Tobacco:  reports that he has never smoked. He quit smokeless tobacco use about 2 years ago.  His smokeless tobacco use included chew.  Alcohol: not currently using  Caffeine: 0-1 cups/day of coffee  PMH: Reviewed in Epic    Medication Adherence/Access:  Patient uses pill box(es). Patient has homecare coming twice weekly to help with meds, but patient is setting them up supervised.   Patient takes medications 3 time(s) per day. 8, 6, 8  Per patient, misses medication 0 times per week.   Medication barriers: none.   The patient fills medications at [facility]: YES.    Liver Transplant:  Current immunosuppressants include TAC 5mg BID (goal dose 6-8), Azathioprine mg daily, Prednisone 5mg daily.  Pt reports no side effects, no diarrhea.  Other meds: Aspirin 325mg daily  Transplant date: 11/11/19  Estimated Creatinine Clearance: 41 mL/min (A) (based on SCr of 2.14 mg/dL (H)).  CMV prophylaxis: CrCl 40 to 59 mL/minute: Valcyte 450 mg once daily Donor (+), Recipient (-), treat 6 months post tx   PCP prophylaxis: Bactrim S S daily for 6 months post txp  Current supplements for electrolyte replacement: MVI daily, Ferrous suflate 325mg TID, Sodium Bicarb 650mg BID (this refill was denied by transplant, likely not being continued as CO2>22 for the past month, magnesium 400mg BID, Phosphorus 250mg BID, Vitamin D 2000 units  daily  Magnesium   Date Value Ref Range Status   2020 2.4 (H) 1.6 - 2.3 mg/dL Final   tx Coordinator: Radha Matthews, Using Med Card: Yes  Infection: 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   Immunizations: annual flu shot 2019; [id number]:  2015; Prevnar 13: 2019; TDaP:  2015; Shingrix: unknown    Diabetes:  Pt currently taking Lantus 24 units daily and Novolog 1 unit per 8 g of CHO with sliding scale. Pt is not experiencing side effects. Followed by Endocrine.   SMB-3 times daily.   Ranges (patient reported): Pt has appt with endocrine on 1/15, did not review blood sugars in depth today.   Low BG: Having a few lows post meal due to vomiting during the week (50-60s).   Recent symptoms of high blood sugar? none  Eye exam: up to date  Foot exam: up to date  ACEi/ARB: No.   Aspirin: Taking 325mg daily and denies side effects  Diet/Exercise: he is still struggling with appetite but is doing his best to follow endo's directions.     Constipation: Pt is taking Lactulose last Monday, took Miralax 17g one time as well with no effect.. Having 1 small hard BM daily    Hyperlipidemia: Current therapy includes Rosuvastatin 5mg daily.  Pt reports some muscle aches after working out. No unexplained myalgias.   The 10-year ASCVD risk score (Gayathri ROSS Jr., et al., 2013) is: 10%    Values used to calculate the score:      Age: 54 years      Sex: Male      Is Non- : No      Diabetic: Yes      Tobacco smoker: No      Systolic Blood Pressure: 108 mmHg      Is BP treated: Yes      HDL Cholesterol: 26 mg/dL      Total Cholesterol: 131 mg/dL    BPH: Pt is taking Tamsulosin 0.4mg daily. Denies sx of urinary hesitancy, incomplete voiding, weak stream etc.    Nausea vomiting: Pt has been taking Compazine or Zofran, he is not sure which. Throwing up once or twice daily. Normally after lunch, sometimes after dinner. He states the anti nausea medications work fairly well.     GERD:  Current medications include: Prilosec (omeprazole) 40mg daily. Pt c/o no current symptoms.  Patient feels that current regimen is effective.    Hypertension: Current medications include Carvedilol 12.5mg BID.  Patient does self-monitor BP. 117/68.  Patient reports no current medication dizziness. Pt is dehydrated today.   BP Readings from Last 3 Encounters:   01/13/20 108/61   01/09/20 105/65   12/30/19 137/67     Today's Vitals: There were no vitals taken for this visit.    ASSESSMENT:                 Medication Adherence: We will move Valcyte and Aspirin to the evening to reduce tablets taken in the morning.    Liver Transplant:  Magnesium has been >2, recommend reducing or holding magnesium to reduce pill burden.     Diabetes:  Considering patient's low blood sugars due to vomiting during the day, we will have patient hold his mealtime insulin until after he eats to assure he is keeping his food down. Although not a perfect solution, it will reduce his risk for lows. Following up with endocrine on Thursday.     Constipation: Pt to try taking Miralax daily for 1-2 weeks, see if this improves his BMs.     Hyperlipidemia: No changes today.     BPH: Stable.     Nausea Vomiting: Pt reminded to set up gastric emptying appt today.     GERD: No changes.     Hypertension: BP likely low due to dehydration.     PLAN:                Pt to...  1. Start Miralax 17g (capful) once daily for at least a week. See if taking this every day improves your constipation.   2. Make the gastric emptying appointment at the  today.   3. Per the transplant team reduce your Azathioprine (Imuran) to 100mg (2 x 50mg tablets) once daily.   4. We are moving your Valcyte and your Aspirin to the evening to reduce your morning doses.  5. You can give your insulin after your meal to make sure you are able to keep down your food.     Txp team...   1. Recommend reducing Magnesium to once daily. Mag levels have been >2 for the past 2 weeks,  elevated level today at 2.4.     I spent 40 minutes with this patient today. I offer these suggestions for consideration by txp team. A copy of the visit note was provided to the patient's referring provider.    Will follow up 4 weeks.    The patient was given a summary of these recommendations as an after visit summary.    Donald Bradley, PharmD  [facility] Pharmacist    Phone: [phone]

## 2020-01-13 NOTE — LETTER
1/13/2020      RE: Frandy Workman  [address]  [place] [address]       Transplant Surgery  Clinic Note  01/13/2020    Assessment & Plan:   Frandy Workman is a 54 year old male with PMHx significant for cirrhosis secondary to ESTRADA diagnosed in 2013, HCC 2018, HTN, HLD, GERD, BPH and DMII. S/p liver transplant 11/11/19 complicated by hematoma evacuation on POD 1. He presents to the clinic today due to nausea and emesis. His emesis is likely due to medications but given his diabetes history, it may be related to gastroparesis.    -will obtain gastric emptying study today  -will decreased Imuran to 100 mg  -will check with insurance about starting Envarsus   -will give patient 1 liter of IV fluid today and schedule him for weekly IV fluids  -will consider starting reglan pending gastric emptying study    SERA/Fellow/Resident Provider: Gonzalo Rodriguez MD    Faculty: Александр Ramos M.D.    I have reviewed history, examined patient and discussed plan with the fellow/resident/SERA.  I concur with the findings in this note.    Immunosuppressive regimen, management and long term risks discussed in detail. Changes, when applicable made as per orders.      Total time: 15 min  Counseling time: 10 min               _________________________________________________________________  Transplant History:   11/11/2019 (Liver), Postoperative day: 63     Interval History: History is obtained from the patient    The patient presents to clinic and he continues to complain of nausea and emesis. The nausea episodes are not related to the timing of his medications. He is able to keep some food down. His blood glucose ranges from . He denies any fevers, chills, or abdominal pain    ROS:   A 10-point review of systems was negative except as noted above.    Meds:    Aflibercept  2 mg Intravitreal Q28 Days     ranibizumab  0.5 mg Intravitreal Q28 Days       Physical Exam:     Admit Weight: 74.3 kg (163 lb 14.4 oz)    Current  vitals:   /61 (BP Location: Left arm, Patient Position: Sitting, Cuff Size: Adult Regular)   Pulse 82   Temp 97.5  F (36.4  C) (Oral)   Wt 74.3 kg (163 lb 14.4 oz)   SpO2 98%   BMI 23.86 kg/m       Vital sign ranges:    Temp:  [97.5  F (36.4  C)] 97.5  F (36.4  C)  Pulse:  [82] 82  BP: (108)/(61) 108/61  SpO2:  [98 %] 98 %    General Appearance: in no apparent distress.   Skin: Warm, perfused  Heart: RRR  Lungs: non-labored breathing on room air  Abdomen: The abdomen is soft, non-tender, non-distended  Extremities: edema: absent, strength normal  Neurologic: awake, alert and oriented. Tremor absent..         Александр Ramos MD

## 2020-01-13 NOTE — Clinical Note
Claudia Garcia, Patient's Magnesium has been above 2 for the past 2 weeks, had an elevated level today, possibly due to dehydration of course. Regardless I would recommend reducing Magnesium to once daily.

## 2020-01-13 NOTE — PROGRESS NOTES
Nursing Note  Frandy Workman presents [facility] for:   Chief Complaint   Patient presents with     Infusion     1 liter IV fluids     During today's [facility] appointment, orders from Dr. Ramos were completed.  Frequency: weekly, patient wondering how long he had to come in once a week, sent msg to coordinator to touch base with patient, then gave our number to schedule appointments    Progress note:  Patient identification verified by name and date of birth.  Assessment completed.  Vitals recorded in Doc Flowsheets.  Patient was provided with education regarding medication/procedure and possible side effects.  Patient verbalized understanding.     present during visit today: Not Applicable.    Treatment Conditions: Non-applicable.    Premedications: were not ordered.  Drug Waste Record: No  Infusion length and rate:  999 ml/hr., over one hour  Labs: were not ordered for this appointment.  Vascular access: peripheral IV placed today.    Post Infusion Assessment:  Patient tolerated infusion without incident.     Discharge Plan:   Follow up plan of care with: ongoing infusions at [facility].  Discharge instructions were reviewed with patient.  Patient/representative verbalized understanding of discharge instructions and all questions answered.  Patient discharged from [facility] in stable condition.    Marie Gomez RN    Administrations This Visit     0.9% sodium chloride BOLUS     Admin Date  01/13/2020 Action  New Bag Dose  1000 mL Route  Intravenous Administered By  Marie Gomez RN                /62   Pulse 90

## 2020-01-14 ENCOUNTER — TELEPHONE (OUTPATIENT)
Dept: TRANSPLANT | Facility: CLINIC | Age: 55
End: 2020-01-14

## 2020-01-14 DIAGNOSIS — N17.9 AKI (ACUTE KIDNEY INJURY) (H): ICD-10-CM

## 2020-01-14 DIAGNOSIS — E11.3293 TYPE 2 DIABETES MELLITUS WITH MILD NONPROLIFERATIVE RETINOPATHY OF BOTH EYES WITHOUT MACULAR EDEMA, UNSPECIFIED WHETHER LONG TERM INSULIN USE (H): ICD-10-CM

## 2020-01-14 RX ORDER — MAGNESIUM OXIDE 400 MG/1
400 TABLET ORAL DAILY
Qty: 60 TABLET | Refills: 0 | Status: SHIPPED | OUTPATIENT
Start: 2020-01-14 | End: 2020-05-12

## 2020-01-14 NOTE — TELEPHONE ENCOUNTER
Tihs Toscano PA-C Betts, Regina 21 hours ago (5:44 PM)      Max DD 80 units daily.   Thanks    Routing comment       Beatris Bonner Alison C, PA-C 4 days ago      Manny Winston,     What is the pts max units per day of Novolog? Need to know for Medicare.     Thank you!     Routing comment      New orders for Novolog pen  routed to  Tish Toscano for increased daily dose of 80 units. Old order was 30 units . Brisa Woods RN on 1/14/2020 at 2:45 PM

## 2020-01-14 NOTE — TELEPHONE ENCOUNTER
"Spoke with patient. He is reporting feeling \"backed up\" patient encouraged to use miralax twice daily to get stool moving. Patient will decrease magnesium to once a day as magnesium level within normal limits. Pt repeated dose change.  "

## 2020-01-15 ENCOUNTER — HOSPITAL ENCOUNTER (OUTPATIENT)
Dept: NUCLEAR MEDICINE | Facility: CLINIC | Age: 55
Setting detail: NUCLEAR MEDICINE
Discharge: HOME | End: 2020-01-15
Attending: SURGERY | Admitting: SURGERY
Payer: MEDICARE

## 2020-01-15 DIAGNOSIS — Z94.4 LIVER TRANSPLANT RECIPIENT (H): ICD-10-CM

## 2020-01-15 PROCEDURE — A9541 TC99M SULFUR COLLOID: HCPCS | Performed by: SURGERY

## 2020-01-15 PROCEDURE — 34300033 ZZH RX 343: Performed by: SURGERY

## 2020-01-15 PROCEDURE — 78264 GASTRIC EMPTYING IMG STUDY: CPT

## 2020-01-15 RX ADMIN — Medication 2.2 MILLICURIE: at 07:49

## 2020-01-16 ENCOUNTER — TELEPHONE (OUTPATIENT)
Dept: TRANSPLANT | Facility: CLINIC | Age: 55
End: 2020-01-16

## 2020-01-16 LAB
ALBUMIN SERPL-MCNC: 4.5 G/DL (ref 3.4–5)
ALP SERPL-CCNC: 112 U/L (ref 40–150)
ALT SERPL W P-5'-P-CCNC: 20 U/L (ref 0–70)
ANION GAP SERPL CALCULATED.3IONS-SCNC: 7 MMOL/L (ref 3–14)
AST SERPL W P-5'-P-CCNC: 15 U/L (ref 0–45)
BILIRUB DIRECT SERPL-MCNC: 0.2 MG/DL (ref 0–0.2)
BILIRUB SERPL-MCNC: 0.6 MG/DL (ref 0.2–1.3)
BUN SERPL-MCNC: 39 MG/DL (ref 7–30)
CALCIUM SERPL-MCNC: 9.1 MG/DL (ref 8.5–10.1)
CHLORIDE SERPL-SCNC: 110 MMOL/L (ref 94–109)
CO2 SERPL-SCNC: 25 MMOL/L (ref 20–32)
CREAT SERPL-MCNC: 1.95 MG/DL (ref 0.66–1.25)
ERYTHROCYTE [DISTWIDTH] IN BLOOD BY AUTOMATED COUNT: 22.2 % (ref 10–15)
GFR SERPL CREATININE-BSD FRML MDRD: 37 ML/MIN/{1.73_M2}
GLUCOSE SERPL-MCNC: 106 MG/DL (ref 70–99)
HCT VFR BLD AUTO: 21.4 % (ref 40–53)
HGB BLD-MCNC: 7.3 G/DL (ref 13.3–17.7)
MAGNESIUM SERPL-MCNC: 2.2 MG/DL (ref 1.6–2.3)
MCH RBC QN AUTO: 33.8 PG (ref 26.5–33)
MCHC RBC AUTO-ENTMCNC: 34.1 G/DL (ref 31.5–36.5)
MCV RBC AUTO: 99 FL (ref 78–100)
PHOSPHATE SERPL-MCNC: 2.9 MG/DL (ref 2.5–4.5)
PLATELET # BLD AUTO: 125 10E9/L (ref 150–450)
POTASSIUM SERPL-SCNC: 4.6 MMOL/L (ref 3.4–5.3)
PROT SERPL-MCNC: 7.7 G/DL (ref 6.8–8.8)
RBC # BLD AUTO: 2.16 10E12/L (ref 4.4–5.9)
SODIUM SERPL-SCNC: 142 MMOL/L (ref 133–144)
TACROLIMUS BLD-MCNC: 7.3 UG/L (ref 5–15)
TME LAST DOSE: NORMAL H
WBC # BLD AUTO: 3.7 10E9/L (ref 4–11)

## 2020-01-16 PROCEDURE — 80076 HEPATIC FUNCTION PANEL: CPT | Performed by: SURGERY

## 2020-01-16 PROCEDURE — 80048 BASIC METABOLIC PNL TOTAL CA: CPT | Performed by: SURGERY

## 2020-01-16 PROCEDURE — 80197 ASSAY OF TACROLIMUS: CPT | Performed by: SURGERY

## 2020-01-16 PROCEDURE — 84100 ASSAY OF PHOSPHORUS: CPT | Performed by: SURGERY

## 2020-01-16 PROCEDURE — 85027 COMPLETE CBC AUTOMATED: CPT | Performed by: SURGERY

## 2020-01-16 PROCEDURE — 83735 ASSAY OF MAGNESIUM: CPT | Performed by: SURGERY

## 2020-01-16 NOTE — TELEPHONE ENCOUNTER
Received a call from [facility] requesting renewal orders for home care. Writer gave verbal ok.

## 2020-01-16 NOTE — TELEPHONE ENCOUNTER
Provider Call: General  Route to LPN    Reason for call: Need OT orders to continue care  2 x a wk x 3 wks     Call back needed? Yes    Return Call Needed  Same as documented in contacts section  When to return call?: Greater than one day: Route standard priority

## 2020-01-20 ENCOUNTER — MEDICAL CORRESPONDENCE (OUTPATIENT)
Dept: HEALTH INFORMATION MANAGEMENT | Facility: CLINIC | Age: 55
End: 2020-01-20

## 2020-01-20 LAB
ALBUMIN SERPL-MCNC: 4.4 G/DL (ref 3.4–5)
ALP SERPL-CCNC: 108 U/L (ref 40–150)
ALT SERPL W P-5'-P-CCNC: 18 U/L (ref 0–70)
ANION GAP SERPL CALCULATED.3IONS-SCNC: 9 MMOL/L (ref 3–14)
AST SERPL W P-5'-P-CCNC: 15 U/L (ref 0–45)
BILIRUB DIRECT SERPL-MCNC: 0.1 MG/DL (ref 0–0.2)
BILIRUB SERPL-MCNC: 0.4 MG/DL (ref 0.2–1.3)
BUN SERPL-MCNC: 53 MG/DL (ref 7–30)
CALCIUM SERPL-MCNC: 9 MG/DL (ref 8.5–10.1)
CHLORIDE SERPL-SCNC: 110 MMOL/L (ref 94–109)
CO2 SERPL-SCNC: 21 MMOL/L (ref 20–32)
CREAT SERPL-MCNC: 1.93 MG/DL (ref 0.66–1.25)
ERYTHROCYTE [DISTWIDTH] IN BLOOD BY AUTOMATED COUNT: 22.8 % (ref 10–15)
GFR SERPL CREATININE-BSD FRML MDRD: 38 ML/MIN/{1.73_M2}
GLUCOSE SERPL-MCNC: 146 MG/DL (ref 70–99)
HCT VFR BLD AUTO: 20.1 % (ref 40–53)
HGB BLD-MCNC: 6.8 G/DL (ref 13.3–17.7)
MAGNESIUM SERPL-MCNC: 2 MG/DL (ref 1.6–2.3)
MCH RBC QN AUTO: 33.8 PG (ref 26.5–33)
MCHC RBC AUTO-ENTMCNC: 33.8 G/DL (ref 31.5–36.5)
MCV RBC AUTO: 100 FL (ref 78–100)
PHOSPHATE SERPL-MCNC: 3.1 MG/DL (ref 2.5–4.5)
PLATELET # BLD AUTO: 135 10E9/L (ref 150–450)
POTASSIUM SERPL-SCNC: 4.7 MMOL/L (ref 3.4–5.3)
PROT SERPL-MCNC: 7.6 G/DL (ref 6.8–8.8)
RBC # BLD AUTO: 2.01 10E12/L (ref 4.4–5.9)
SODIUM SERPL-SCNC: 140 MMOL/L (ref 133–144)
TACROLIMUS BLD-MCNC: 6.5 UG/L (ref 5–15)
TME LAST DOSE: NORMAL H
WBC # BLD AUTO: 2.6 10E9/L (ref 4–11)

## 2020-01-20 PROCEDURE — 80076 HEPATIC FUNCTION PANEL: CPT | Performed by: SURGERY

## 2020-01-20 PROCEDURE — 83735 ASSAY OF MAGNESIUM: CPT | Performed by: SURGERY

## 2020-01-20 PROCEDURE — 85027 COMPLETE CBC AUTOMATED: CPT | Performed by: SURGERY

## 2020-01-20 PROCEDURE — 80197 ASSAY OF TACROLIMUS: CPT | Performed by: SURGERY

## 2020-01-20 PROCEDURE — 84100 ASSAY OF PHOSPHORUS: CPT | Performed by: SURGERY

## 2020-01-20 PROCEDURE — 80048 BASIC METABOLIC PNL TOTAL CA: CPT | Performed by: SURGERY

## 2020-01-21 ENCOUNTER — OFFICE VISIT (OUTPATIENT)
Dept: INTERNAL MEDICINE | Facility: CLINIC | Age: 55
End: 2020-01-21
Payer: MEDICARE

## 2020-01-21 ENCOUNTER — INFUSION THERAPY VISIT (OUTPATIENT)
Dept: INFUSION THERAPY | Facility: CLINIC | Age: 55
End: 2020-01-21
Attending: SURGERY
Payer: MEDICARE

## 2020-01-21 ENCOUNTER — TELEPHONE (OUTPATIENT)
Dept: ENDOCRINOLOGY | Facility: CLINIC | Age: 55
End: 2020-01-21

## 2020-01-21 VITALS
DIASTOLIC BLOOD PRESSURE: 68 MMHG | OXYGEN SATURATION: 100 % | SYSTOLIC BLOOD PRESSURE: 135 MMHG | RESPIRATION RATE: 18 BRPM | HEART RATE: 90 BPM | TEMPERATURE: 97.7 F

## 2020-01-21 VITALS
SYSTOLIC BLOOD PRESSURE: 104 MMHG | HEART RATE: 85 BPM | BODY MASS INDEX: 23.39 KG/M2 | OXYGEN SATURATION: 98 % | WEIGHT: 160.7 LBS | TEMPERATURE: 97.6 F | DIASTOLIC BLOOD PRESSURE: 72 MMHG

## 2020-01-21 DIAGNOSIS — F41.9 ANXIETY: Primary | ICD-10-CM

## 2020-01-21 DIAGNOSIS — Z94.4 LIVER TRANSPLANT RECIPIENT (H): ICD-10-CM

## 2020-01-21 DIAGNOSIS — E11.3293 TYPE 2 DIABETES MELLITUS WITH MILD NONPROLIFERATIVE RETINOPATHY OF BOTH EYES WITHOUT MACULAR EDEMA, UNSPECIFIED WHETHER LONG TERM INSULIN USE (H): ICD-10-CM

## 2020-01-21 DIAGNOSIS — E11.9 TYPE 2 DIABETES MELLITUS WITHOUT COMPLICATION, WITH LONG-TERM CURRENT USE OF INSULIN (H): ICD-10-CM

## 2020-01-21 DIAGNOSIS — Z79.4 TYPE 2 DIABETES MELLITUS WITHOUT COMPLICATION, WITH LONG-TERM CURRENT USE OF INSULIN (H): ICD-10-CM

## 2020-01-21 DIAGNOSIS — N18.2 CKD (CHRONIC KIDNEY DISEASE) STAGE 2, GFR 60-89 ML/MIN: Primary | ICD-10-CM

## 2020-01-21 DIAGNOSIS — F33.0 MILD RECURRENT MAJOR DEPRESSION (H): ICD-10-CM

## 2020-01-21 PROCEDURE — 25800030 ZZH RX IP 258 OP 636: Mod: ZF | Performed by: SURGERY

## 2020-01-21 RX ORDER — METOCLOPRAMIDE 5 MG/1
5 TABLET ORAL 3 TIMES DAILY PRN
Qty: 90 TABLET | Refills: 0 | Status: CANCELLED | OUTPATIENT
Start: 2020-01-21

## 2020-01-21 RX ORDER — PEN NEEDLE, DIABETIC 32GX 5/32"
NEEDLE, DISPOSABLE MISCELLANEOUS
Qty: 300 EACH | Refills: 3 | Status: SHIPPED | OUTPATIENT
Start: 2020-01-21 | End: 2020-07-09

## 2020-01-21 RX ADMIN — SODIUM CHLORIDE 1000 ML: 9 INJECTION, SOLUTION INTRAVENOUS at 14:08

## 2020-01-21 ASSESSMENT — PAIN SCALES - GENERAL: PAINLEVEL: NO PAIN (0)

## 2020-01-21 NOTE — PATIENT INSTRUCTIONS
[facility] Medication Refill Request Information:  * Please contact your pharmacy regarding ANY request for medication refills.  ** [facility] Prescription Fax = [phone]  * Please allow 3 business days for routine medication refills.  * Please allow 5 business days for controlled substance medication refills.     [facility] Test Result notification information:  *You will be notified with in 7-10 days of your appointment day regarding the results of your test.  If you are on MyChart you will be notified as soon as the provider has reviewed the results and signed off on them.    [facility]: [phone]          [facility]         Internal Medicine Resident                   Continuity Clinic    Who We Are    Resident Continuity Clinic is a part of the [facility] Primary Care Clinic.  Resident physicians see patients independently and establish a relationship with them over the course of their three-year residency program.  As with the Primary Care Clinic, our Resident Continuity Clinic models a group practice.  If your doctor is not available, you will be able to see another resident physician.  At the end of a resident s training, patients will be transitioned to a new resident physician for ongoing care.     We treat patients with a wide array of medical needs from routine physicals, to acute illnesses, to diabetes and blood pressure management, to complex medical illness.  What is a Resident Physician?    Resident physicians hold medical degrees and are doctors. They are training to become specialists in Internal Medicine. They work under the supervision of board-certified faculty physicians.  Expectations for Your Care    We strive to provide accessible, quality care at all times.    In order to provide this care, it is best to see your primary care resident doctor consistently rather switching between providers.  In the event you do see another physician, you should schedule  a follow-up visit with your usual primary care doctor.    If you are transitioning your care from another clinic, it is helpful to have your records available for your doctor to review.    We do not prescribe controlled substances, such as ADD medications or narcotic pain medications, on your first visit.  We will review your health records and concerns prior to devising a treatment plan with you in order to provide the best care.      Clinic Services     Extended clinic hours; patient  to help navigate your visit;  parking; laboratory and imaging services with evening and weekend hours    Multiple medical and surgical specialties in one building    Complementary services, including Nutrition, Integrative Medicine, Pharmacy consultations, Mental and Behavioral Health, Sports Medicine and Physical Therapy    Thank You    We would like to thank you for choosing the [facility] Internal Medicine Resident Continuity Clinic for your primary care. You are making a priceless contribution to the training of the next generation of health care practitioners.     Contact us at [phone] for appointments or questions.    Resident Clinic Hours are Tuesdays and Thursdays, 7:30am-5:00pm    Residents   Wilmer Tovar MD  (Male)   Phil Tiwari MD   (Male)   Lennie Torres MD  (Female)  Pily Kennedy MD   (Female)   Celena Castillo MD   (Female)    Coral Almaraz MD    (Female)   Sean Coronel MD  (Male)   Kenny Méndez MD  (Male)    Malena Burton MD  (Female)   Ashok Doty MD  (Female)   Carey Lilly MD    (Female)   Maximo Tucker MD  (Male)   Jarrett Mckay MD  (Male)   Mohamud Tavarez MD  (Male)   FLAKO Bacon MD   (Male)   Leland Hobbs MD  (Male)    Cristin Cao MD (Female)   Ovidio Smith MD  (Male)   Luis F Manrique MD  (Male)   Whitney Benitez MD  (Male)   Venice Sanabria MD    (Female)   Jamarcus Rebolledo MD  (Female)     Supervising Physicians   MD Julián Ortiz,  MD Natalie Russell, MD Castro Markham, MD Dieter Borja, MD Rani German, MD Brenda Delgadillo, MD Jose Luis Sanderson, MD Charlene Clark MD

## 2020-01-21 NOTE — PROGRESS NOTES
PRIMARY CARE CENTER       SUBJECTIVE:  Frandy Workman is a 54 year old male with PMH notable for DM type II, ESTRADA cirrhosis, hepaocellular carcinoma s/p TACE (1/2018), s/p liver transplant (11/11/19) who presents today for routine follow up and re-assessment of anxiety and depression. Patient feels that his mood has significantly improved since he has started sertraline. Remains somewhat frustrated by slow progress in post transplant recovery. He has struggled with nausea and vomiting which has resolved in occasional hypoglycemic events. These have been improved when he started dosing insulin after eating. Concern for delayed gastric emptying and is following up with GI in near future. Otherwise denies any fevers, chills, headache, shortness of breath, melena, hematochezia, abdominal pain or chest pain.     Of note he did have labs recently drawn on 1/20 at which time he was noted to have decreased hgb to 6.8 and WBC 2.6. No new medications have been started and he has continued on valcyte and bactrim. No signs or symptoms of bleeding.     Medications and allergies reviewed by me today.     ROS:   Constitutional, neuro, ENT, endocrine, pulmonary, cardiac, gastrointestinal, genitourinary, musculoskeletal, integument and psychiatric systems are negative, except as otherwise noted.    OBJECTIVE:  /72 (BP Location: Right arm, Patient Position: Sitting, Cuff Size: Adult Regular)   Pulse 85   Temp 97.6  F (36.4  C) (Oral)   Wt 72.9 kg (160 lb 11.2 oz)   SpO2 98%   BMI 23.39 kg/m     Wt Readings from Last 1 Encounters:   01/21/20 72.9 kg (160 lb 11.2 oz)     Body mass index is 23.39 kg/m .  Vitals were reviewed      GENERAL APPEARANCE: healthy, pale, alert and no distress     EYES: EOMI,  PERRL     HENT: nose and mouth without ulcers or lesions     NECK: no adenopathy, no asymmetry, masses, or scars      RESP: lungs clear to auscultation - no rales, rhonchi or wheezes     CV: regular rates  and rhythm, normal S1 S2, no murmur, click or rub     ABDOMEN:  soft, mild tenderness over upper quadrants, normoactive bowel sounds     SKIN: abdominal surgical scar well healed      NEURO: Normal strength and tone, sensory exam grossly normal, mentation intact and speech normal     PSYCH: mentation appears normal. and affect normal/bright     LYMPHATICS: No cervical adenopathy     ASSESSMENT/PLAN:    Frandy was seen today for anxiety.    Diagnoses and all orders for this visit:    Depression  Anxiety  - Continue sertraline 50 mg daily    Macrocytic anemia  Leukopenia   WBC noted to be 2.6 and hgb 6.8 on 1/20. No overt signs or symptoms of bleeding. Discussed findings with transplant coordinator and labs have already been sent to transplant surgery for further evaluation.      Pt should return to clinic for f/u with me in 6 weeks .     Options for treatment and follow-up care were reviewed with the patient. Frandy Workman engaged in the decision making process and verbalized understanding of the options discussed and agreed with the final plan.    Maximo Tucker,   Internal Medicine, PGY3  Jan 21, 2020    Plan of care discussed with Dr. Moe   Teaching Physician Note:  I was present during the visit and the patient was seen and examined by me.   I discussed the case with the resident and agree with the note as documented by the resident with the following exceptions:  None.    Nerissa Moe M.D.  Internal Medicine   pager [phone]

## 2020-01-21 NOTE — NURSING NOTE
Chief Complaint   Patient presents with     Anxiety     Pt comes in for follow up on anxiety and depression      Valerie [place] EMT at 12:43 PM sign on 1/21/2020

## 2020-01-21 NOTE — TELEPHONE ENCOUNTER
[facility] Call Center    Phone Message    May a detailed message be left on voicemail: yes    Reason for Call: Medication Question or concern regarding medication   Prescription Clarification  Name of Medication: insulin aspart (NOVOLOG PEN) 100 UNIT/ML pen  Prescribing Provider: Dorcas   Pharmacy: [facility] - [place], [place] - [address]   What on the order needs clarification? Directions/ max total daily units needed. Call back ASAP [phone]          Action Taken: Message routed to:  [facility] ([facility]): Endo/Diabetes

## 2020-01-21 NOTE — TELEPHONE ENCOUNTER
Sent Novolog pen again with Max units per day of 80 instead of approx. Brisa Woods RN on 1/21/2020 at 5:45 PM

## 2020-01-21 NOTE — PROGRESS NOTES
Nursing Note  Frandy Workman presents [facility] for:   Chief Complaint   Patient presents with     Infusion     1L IVF     During today's [facility] appointment, orders from Dr. Ramos were completed.  Frequency: PRN    Progress note:  Patient identification verified by name and date of birth.  Assessment completed.  Vitals recorded in Doc Flowsheets.  Patient was provided with education regarding medication/procedure and possible side effects.  Patient verbalized understanding.     present during visit today: Not Applicable.    Treatment Conditions: Non-applicable.    Premedications: were not ordered.  Drug Waste Record: No  Infusion length and rate:  999 ml/hr  over one hour  Labs: were not ordered for this appointment.  Vascular access: peripheral IV placed today.    Post Infusion Assessment:  Patient tolerated infusion without incident.     Discharge Plan:   Follow up plan of care with: ongoing infusions at [facility].  Discharge instructions were reviewed with patient.  Patient/representative verbalized understanding of discharge instructions and all questions answered.  Patient discharged from [facility] in stable condition.    Mary Beth Irvin RN    Administrations This Visit     0.9% sodium chloride BOLUS     Admin Date  01/21/2020 Action  New Bag Dose  1000 mL Route  Intravenous Administered By  Mary Beth Irvin RN                /63   Pulse 75   Temp 97.7  F (36.5  C) (Oral)   Resp 18   SpO2 100%

## 2020-01-22 ENCOUNTER — TELEPHONE (OUTPATIENT)
Dept: TRANSPLANT | Facility: CLINIC | Age: 55
End: 2020-01-22

## 2020-01-22 PROBLEM — F41.9 ANXIETY: Status: ACTIVE | Noted: 2020-01-22

## 2020-01-22 PROBLEM — F33.0 MILD RECURRENT MAJOR DEPRESSION (H): Status: ACTIVE | Noted: 2020-01-22

## 2020-01-22 NOTE — TELEPHONE ENCOUNTER
"Spoke with patient to check in. He reports that he is only vomiting every other day and feels the fluids have helped with this. He is still feeling constipated. He is using miralax daily, but he is \"struggling to stool and it's hard pellets.\" Patient will increase to twice a day miralax for a few days to get things moving along.   "

## 2020-01-23 ENCOUNTER — TELEPHONE (OUTPATIENT)
Dept: TRANSPLANT | Facility: CLINIC | Age: 55
End: 2020-01-23

## 2020-01-23 DIAGNOSIS — D64.9 ANEMIA: Primary | ICD-10-CM

## 2020-01-23 LAB
ALBUMIN SERPL-MCNC: 4 G/DL (ref 3.4–5)
ALP SERPL-CCNC: 103 U/L (ref 40–150)
ALT SERPL W P-5'-P-CCNC: 19 U/L (ref 0–70)
ANION GAP SERPL CALCULATED.3IONS-SCNC: 7 MMOL/L (ref 3–14)
AST SERPL W P-5'-P-CCNC: 13 U/L (ref 0–45)
BILIRUB DIRECT SERPL-MCNC: 0.1 MG/DL (ref 0–0.2)
BILIRUB SERPL-MCNC: 0.5 MG/DL (ref 0.2–1.3)
BUN SERPL-MCNC: 45 MG/DL (ref 7–30)
CALCIUM SERPL-MCNC: 8.8 MG/DL (ref 8.5–10.1)
CHLORIDE SERPL-SCNC: 114 MMOL/L (ref 94–109)
CO2 SERPL-SCNC: 22 MMOL/L (ref 20–32)
CREAT SERPL-MCNC: 1.77 MG/DL (ref 0.66–1.25)
ERYTHROCYTE [DISTWIDTH] IN BLOOD BY AUTOMATED COUNT: 23.4 % (ref 10–15)
GFR SERPL CREATININE-BSD FRML MDRD: 42 ML/MIN/{1.73_M2}
GLUCOSE SERPL-MCNC: 103 MG/DL (ref 70–99)
HCT VFR BLD AUTO: 18.8 % (ref 40–53)
HGB BLD-MCNC: 6.5 G/DL (ref 13.3–17.7)
MAGNESIUM SERPL-MCNC: 2.1 MG/DL (ref 1.6–2.3)
MCH RBC QN AUTO: 34.8 PG (ref 26.5–33)
MCHC RBC AUTO-ENTMCNC: 34.6 G/DL (ref 31.5–36.5)
MCV RBC AUTO: 101 FL (ref 78–100)
PHOSPHATE SERPL-MCNC: 3.2 MG/DL (ref 2.5–4.5)
PLATELET # BLD AUTO: 123 10E9/L (ref 150–450)
POTASSIUM SERPL-SCNC: 4.4 MMOL/L (ref 3.4–5.3)
PROT SERPL-MCNC: 7.3 G/DL (ref 6.8–8.8)
RBC # BLD AUTO: 1.87 10E12/L (ref 4.4–5.9)
SODIUM SERPL-SCNC: 143 MMOL/L (ref 133–144)
TACROLIMUS BLD-MCNC: 4.5 UG/L (ref 5–15)
TME LAST DOSE: ABNORMAL H
WBC # BLD AUTO: 1.9 10E9/L (ref 4–11)

## 2020-01-23 PROCEDURE — 80048 BASIC METABOLIC PNL TOTAL CA: CPT | Performed by: SURGERY

## 2020-01-23 PROCEDURE — 80076 HEPATIC FUNCTION PANEL: CPT | Performed by: SURGERY

## 2020-01-23 PROCEDURE — 84100 ASSAY OF PHOSPHORUS: CPT | Performed by: SURGERY

## 2020-01-23 PROCEDURE — 83735 ASSAY OF MAGNESIUM: CPT | Performed by: SURGERY

## 2020-01-23 PROCEDURE — 85045 AUTOMATED RETICULOCYTE COUNT: CPT | Performed by: SURGERY

## 2020-01-23 PROCEDURE — 80197 ASSAY OF TACROLIMUS: CPT | Performed by: SURGERY

## 2020-01-23 PROCEDURE — 85027 COMPLETE CBC AUTOMATED: CPT | Performed by: SURGERY

## 2020-01-23 NOTE — PROGRESS NOTES
Reviewed with Dr Bob that patient was not taking his iron correctly. Patient stopped iron due to his constipation. Per dr bob patient to restart iron three times a day and only have 1 unit of PRBCs. Patient aware of plan. Patient will start taking his iron three times daily and will set up his 1 unit of blood when [facility] calls him.

## 2020-01-23 NOTE — TELEPHONE ENCOUNTER
DATE:  1/23/2020   TIME OF RECEIPT FROM LAB:  10:03a  LAB TEST:  hgb  LAB VALUE:  6.5  RESULTS GIVEN WITH READ-BACK TO (PROVIDER):  Radha Ndiaye RN  TIME LAB VALUE REPORTED TO PROVIDER:   10:06a

## 2020-01-23 NOTE — TELEPHONE ENCOUNTER
Provider Call: General  Route to LPN    Reason for call: Call from N- she noticed pt has been missing noon dose of Iron  Knows his Hgb is still low  Per pt he is used to taking 1 tab in Am and 2 pills in evening  Wonders if he would be more compliant      Call back needed? Yes    Return Call Needed  Same as documented in contacts section  When to return call?: Greater than one day: Route standard priority

## 2020-01-24 ENCOUNTER — ANCILLARY PROCEDURE (OUTPATIENT)
Dept: MRI IMAGING | Facility: CLINIC | Age: 55
End: 2020-01-24
Attending: INTERNAL MEDICINE
Payer: MEDICARE

## 2020-01-24 ENCOUNTER — TELEPHONE (OUTPATIENT)
Dept: TRANSPLANT | Facility: CLINIC | Age: 55
End: 2020-01-24

## 2020-01-24 DIAGNOSIS — Z13.220 LIPID SCREENING: ICD-10-CM

## 2020-01-24 DIAGNOSIS — Z94.4 LIVER TRANSPLANT RECIPIENT (H): Primary | ICD-10-CM

## 2020-01-24 DIAGNOSIS — Z94.4 LIVER REPLACED BY TRANSPLANT (H): ICD-10-CM

## 2020-01-24 DIAGNOSIS — E11.3293 TYPE 2 DIABETES MELLITUS WITH MILD NONPROLIFERATIVE RETINOPATHY OF BOTH EYES WITHOUT MACULAR EDEMA, UNSPECIFIED WHETHER LONG TERM INSULIN USE (H): ICD-10-CM

## 2020-01-24 DIAGNOSIS — C22.0 HCC (HEPATOCELLULAR CARCINOMA) (H): ICD-10-CM

## 2020-01-24 LAB
AFP SERPL-MCNC: <1.5 UG/L (ref 0–8)
ALBUMIN SERPL-MCNC: 4.1 G/DL (ref 3.4–5)
ALP SERPL-CCNC: 104 U/L (ref 40–150)
ALT SERPL W P-5'-P-CCNC: 20 U/L (ref 0–70)
ANION GAP SERPL CALCULATED.3IONS-SCNC: 7 MMOL/L (ref 3–14)
AST SERPL W P-5'-P-CCNC: 14 U/L (ref 0–45)
BASOPHILS # BLD AUTO: 0 10E9/L (ref 0–0.2)
BASOPHILS NFR BLD AUTO: 0.6 %
BILIRUB DIRECT SERPL-MCNC: 0.1 MG/DL (ref 0–0.2)
BILIRUB SERPL-MCNC: 0.5 MG/DL (ref 0.2–1.3)
BUN SERPL-MCNC: 46 MG/DL (ref 7–30)
CALCIUM SERPL-MCNC: 9.1 MG/DL (ref 8.5–10.1)
CHLORIDE SERPL-SCNC: 114 MMOL/L (ref 94–109)
CO2 SERPL-SCNC: 21 MMOL/L (ref 20–32)
CREAT SERPL-MCNC: 1.9 MG/DL (ref 0.66–1.25)
DIFFERENTIAL METHOD BLD: ABNORMAL
EOSINOPHIL # BLD AUTO: 0 10E9/L (ref 0–0.7)
EOSINOPHIL NFR BLD AUTO: 1.1 %
ERYTHROCYTE [DISTWIDTH] IN BLOOD BY AUTOMATED COUNT: 22.7 % (ref 10–15)
GFR SERPL CREATININE-BSD FRML MDRD: 39 ML/MIN/{1.73_M2}
GLUCOSE SERPL-MCNC: 101 MG/DL (ref 70–99)
HCT VFR BLD AUTO: 18.1 % (ref 40–53)
HGB BLD-MCNC: 6 G/DL (ref 13.3–17.7)
IMM GRANULOCYTES # BLD: 0 10E9/L (ref 0–0.4)
IMM GRANULOCYTES NFR BLD: 0.6 %
INR PPP: 1.09 (ref 0.86–1.14)
LYMPHOCYTES # BLD AUTO: 0.5 10E9/L (ref 0.8–5.3)
LYMPHOCYTES NFR BLD AUTO: 29 %
MAGNESIUM SERPL-MCNC: 2.1 MG/DL (ref 1.6–2.3)
MCH RBC QN AUTO: 33.9 PG (ref 26.5–33)
MCHC RBC AUTO-ENTMCNC: 33.1 G/DL (ref 31.5–36.5)
MCV RBC AUTO: 102 FL (ref 78–100)
MONOCYTES # BLD AUTO: 0.1 10E9/L (ref 0–1.3)
MONOCYTES NFR BLD AUTO: 6.3 %
NEUTROPHILS # BLD AUTO: 1.1 10E9/L (ref 1.6–8.3)
NEUTROPHILS NFR BLD AUTO: 62.4 %
NRBC # BLD AUTO: 0 10*3/UL
NRBC BLD AUTO-RTO: 0 /100
PHOSPHATE SERPL-MCNC: 3.3 MG/DL (ref 2.5–4.5)
PLATELET # BLD AUTO: 118 10E9/L (ref 150–450)
POTASSIUM SERPL-SCNC: 5 MMOL/L (ref 3.4–5.3)
PROT SERPL-MCNC: 7.3 G/DL (ref 6.8–8.8)
RBC # BLD AUTO: 1.77 10E12/L (ref 4.4–5.9)
RETICS # AUTO: 97.7 10E9/L (ref 25–95)
RETICS/RBC NFR AUTO: 5.3 % (ref 0.5–2)
SODIUM SERPL-SCNC: 142 MMOL/L (ref 133–144)
TACROLIMUS BLD-MCNC: 5.9 UG/L (ref 5–15)
TME LAST DOSE: 2005 H
TSH SERPL DL<=0.005 MIU/L-ACNC: 1.91 MU/L (ref 0.4–4)
WBC # BLD AUTO: 1.8 10E9/L (ref 4–11)

## 2020-01-24 PROCEDURE — 80197 ASSAY OF TACROLIMUS: CPT | Performed by: SURGERY

## 2020-01-24 PROCEDURE — 82105 ALPHA-FETOPROTEIN SERUM: CPT | Performed by: INTERNAL MEDICINE

## 2020-01-24 RX ORDER — GADOBUTROL 604.72 MG/ML
7.5 INJECTION INTRAVENOUS ONCE
Status: COMPLETED | OUTPATIENT
Start: 2020-01-24 | End: 2020-01-24

## 2020-01-24 RX ADMIN — GADOBUTROL 7 ML: 604.72 INJECTION INTRAVENOUS at 09:16

## 2020-01-24 NOTE — TELEPHONE ENCOUNTER
DATE:  1/24/2020   TIME OF RECEIPT FROM LAB:  8:57a  LAB TEST:  hgb  LAB VALUE:  6.0  RESULTS GIVEN WITH READ-BACK TO (PROVIDER):  Radha Ndiaye RN  TIME LAB VALUE REPORTED TO PROVIDER:   8:59a

## 2020-01-24 NOTE — DISCHARGE INSTRUCTIONS
MRI Contrast Discharge Instructions    The IV contrast you received today will pass out of your body in your  urine. This will happen in the next 24 hours. You will not feel this process.  Your urine will not change color.    Drink at least 4 extra glasses of water or juice today (unless your doctor  has restricted your fluids). This reduces the stress on your kidneys.  You may take your regular medicines.    If you are on dialysis: It is best to have dialysis today.    If you have a reaction: Most reactions happen right away. If you have  any new symptoms after leaving the hospital (such as hives or swelling),  call your hospital at the correct number below. Or call your family doctor.  If you have breathing distress or wheezing, call 911.    Special instructions: ***    I have read and understand the above information.    Signature:______________________________________ Date:___________    Staff:__________________________________________ Date:___________     Time:__________    [facility] Departments:    ___[facility]: [phone]  ___[facility]: [phone]  ___[place]: [phone] ___[facility]: [phone]  ___[facility]: [phone]  ___[facility]: [phone]  ___[phone]  ___[facility]: [phone]  ___[phone]

## 2020-01-27 ENCOUNTER — ONCOLOGY VISIT (OUTPATIENT)
Dept: ONCOLOGY | Facility: CLINIC | Age: 55
End: 2020-01-27
Attending: INTERNAL MEDICINE
Payer: MEDICARE

## 2020-01-27 ENCOUNTER — MEDICAL CORRESPONDENCE (OUTPATIENT)
Dept: HEALTH INFORMATION MANAGEMENT | Facility: CLINIC | Age: 55
End: 2020-01-27

## 2020-01-27 ENCOUNTER — INFUSION THERAPY VISIT (OUTPATIENT)
Dept: INFUSION THERAPY | Facility: CLINIC | Age: 55
End: 2020-01-27
Attending: TRANSPLANT SURGERY
Payer: MEDICARE

## 2020-01-27 VITALS
TEMPERATURE: 97.7 F | SYSTOLIC BLOOD PRESSURE: 111 MMHG | BODY MASS INDEX: 23.46 KG/M2 | DIASTOLIC BLOOD PRESSURE: 68 MMHG | WEIGHT: 161.2 LBS | RESPIRATION RATE: 18 BRPM | HEART RATE: 94 BPM | OXYGEN SATURATION: 100 %

## 2020-01-27 VITALS
OXYGEN SATURATION: 100 % | SYSTOLIC BLOOD PRESSURE: 120 MMHG | HEART RATE: 86 BPM | DIASTOLIC BLOOD PRESSURE: 65 MMHG | RESPIRATION RATE: 16 BRPM | TEMPERATURE: 98.5 F

## 2020-01-27 DIAGNOSIS — Z13.220 LIPID SCREENING: ICD-10-CM

## 2020-01-27 DIAGNOSIS — C22.0 HCC (HEPATOCELLULAR CARCINOMA) (H): Primary | ICD-10-CM

## 2020-01-27 DIAGNOSIS — D64.9 ANEMIA: ICD-10-CM

## 2020-01-27 DIAGNOSIS — I81 PORTAL VEIN THROMBOSIS: ICD-10-CM

## 2020-01-27 DIAGNOSIS — D69.6 THROMBOCYTOPENIA (H): ICD-10-CM

## 2020-01-27 DIAGNOSIS — D63.1 ANEMIA DUE TO STAGE 3 CHRONIC KIDNEY DISEASE (H): ICD-10-CM

## 2020-01-27 DIAGNOSIS — D84.9 IMMUNOSUPPRESSED STATUS (H): ICD-10-CM

## 2020-01-27 DIAGNOSIS — N18.30 ANEMIA DUE TO STAGE 3 CHRONIC KIDNEY DISEASE (H): ICD-10-CM

## 2020-01-27 DIAGNOSIS — K31.9 GASTROPATHY: ICD-10-CM

## 2020-01-27 DIAGNOSIS — Z94.4 STATUS POST LIVER TRANSPLANTATION (H): ICD-10-CM

## 2020-01-27 DIAGNOSIS — N18.30 CKD (CHRONIC KIDNEY DISEASE), STAGE III (H): ICD-10-CM

## 2020-01-27 DIAGNOSIS — D64.9 ANEMIA: Primary | ICD-10-CM

## 2020-01-27 DIAGNOSIS — Z94.4 LIVER REPLACED BY TRANSPLANT (H): ICD-10-CM

## 2020-01-27 PROBLEM — R41.0 DELIRIUM: Status: RESOLVED | Noted: 2019-11-20 | Resolved: 2020-01-27

## 2020-01-27 PROBLEM — N17.9 AKI (ACUTE KIDNEY INJURY) (H): Status: RESOLVED | Noted: 2019-12-02 | Resolved: 2020-01-27

## 2020-01-27 PROBLEM — D62 ANEMIA DUE TO BLOOD LOSS, ACUTE: Status: RESOLVED | Noted: 2019-11-20 | Resolved: 2020-01-27

## 2020-01-27 LAB
ALBUMIN SERPL-MCNC: 4.4 G/DL (ref 3.4–5)
ALP SERPL-CCNC: 106 U/L (ref 40–150)
ALT SERPL W P-5'-P-CCNC: 19 U/L (ref 0–70)
ANION GAP SERPL CALCULATED.3IONS-SCNC: 8 MMOL/L (ref 3–14)
AST SERPL W P-5'-P-CCNC: 10 U/L (ref 0–45)
BILIRUB DIRECT SERPL-MCNC: 0.1 MG/DL (ref 0–0.2)
BILIRUB SERPL-MCNC: 0.5 MG/DL (ref 0.2–1.3)
BLD PROD TYP BPU: NORMAL
BLD UNIT ID BPU: 0
BLOOD PRODUCT CODE: NORMAL
BPU ID: NORMAL
BUN SERPL-MCNC: 54 MG/DL (ref 7–30)
CALCIUM SERPL-MCNC: 9.1 MG/DL (ref 8.5–10.1)
CHLORIDE SERPL-SCNC: 114 MMOL/L (ref 94–109)
CO2 SERPL-SCNC: 21 MMOL/L (ref 20–32)
CREAT SERPL-MCNC: 2.02 MG/DL (ref 0.66–1.25)
ERYTHROCYTE [DISTWIDTH] IN BLOOD BY AUTOMATED COUNT: 22.5 % (ref 10–15)
FERRITIN SERPL-MCNC: 690 NG/ML (ref 26–388)
GFR SERPL CREATININE-BSD FRML MDRD: 36 ML/MIN/{1.73_M2}
GLUCOSE SERPL-MCNC: 112 MG/DL (ref 70–99)
HCT VFR BLD AUTO: 17.2 % (ref 40–53)
HGB BLD-MCNC: 5.7 G/DL (ref 13.3–17.7)
MAGNESIUM SERPL-MCNC: 2.1 MG/DL (ref 1.6–2.3)
MCH RBC QN AUTO: 34.1 PG (ref 26.5–33)
MCHC RBC AUTO-ENTMCNC: 33.1 G/DL (ref 31.5–36.5)
MCV RBC AUTO: 103 FL (ref 78–100)
PHOSPHATE SERPL-MCNC: 3.3 MG/DL (ref 2.5–4.5)
PLATELET # BLD AUTO: 120 10E9/L (ref 150–450)
POTASSIUM SERPL-SCNC: 4.7 MMOL/L (ref 3.4–5.3)
PROT SERPL-MCNC: 7.5 G/DL (ref 6.8–8.8)
RBC # BLD AUTO: 1.67 10E12/L (ref 4.4–5.9)
RETICS # AUTO: 34.2 10E9/L (ref 25–95)
RETICS/RBC NFR AUTO: 2.2 % (ref 0.5–2)
SODIUM SERPL-SCNC: 143 MMOL/L (ref 133–144)
TACROLIMUS BLD-MCNC: 4.9 UG/L (ref 5–15)
TME LAST DOSE: ABNORMAL H
TRANSFUSION STATUS PATIENT QL: NORMAL
TRANSFUSION STATUS PATIENT QL: NORMAL
WBC # BLD AUTO: 1.5 10E9/L (ref 4–11)

## 2020-01-27 PROCEDURE — P9016 RBC LEUKOCYTES REDUCED: HCPCS | Performed by: TRANSPLANT SURGERY

## 2020-01-27 PROCEDURE — 82728 ASSAY OF FERRITIN: CPT | Performed by: INTERNAL MEDICINE

## 2020-01-27 PROCEDURE — 86900 BLOOD TYPING SEROLOGIC ABO: CPT | Performed by: TRANSPLANT SURGERY

## 2020-01-27 PROCEDURE — G0463 HOSPITAL OUTPT CLINIC VISIT: HCPCS | Mod: ZF

## 2020-01-27 PROCEDURE — 86850 RBC ANTIBODY SCREEN: CPT | Performed by: TRANSPLANT SURGERY

## 2020-01-27 PROCEDURE — 99214 OFFICE O/P EST MOD 30 MIN: CPT | Mod: ZP | Performed by: INTERNAL MEDICINE

## 2020-01-27 PROCEDURE — 82668 ASSAY OF ERYTHROPOIETIN: CPT | Performed by: INTERNAL MEDICINE

## 2020-01-27 PROCEDURE — 36415 COLL VENOUS BLD VENIPUNCTURE: CPT | Performed by: SURGERY

## 2020-01-27 PROCEDURE — 80076 HEPATIC FUNCTION PANEL: CPT | Performed by: SURGERY

## 2020-01-27 PROCEDURE — 85045 AUTOMATED RETICULOCYTE COUNT: CPT | Performed by: INTERNAL MEDICINE

## 2020-01-27 PROCEDURE — 83735 ASSAY OF MAGNESIUM: CPT | Performed by: SURGERY

## 2020-01-27 PROCEDURE — 80048 BASIC METABOLIC PNL TOTAL CA: CPT | Performed by: SURGERY

## 2020-01-27 PROCEDURE — 85027 COMPLETE CBC AUTOMATED: CPT | Performed by: SURGERY

## 2020-01-27 PROCEDURE — G0463 HOSPITAL OUTPT CLINIC VISIT: HCPCS | Mod: 25

## 2020-01-27 PROCEDURE — 86901 BLOOD TYPING SEROLOGIC RH(D): CPT | Performed by: TRANSPLANT SURGERY

## 2020-01-27 PROCEDURE — 86923 COMPATIBILITY TEST ELECTRIC: CPT | Performed by: TRANSPLANT SURGERY

## 2020-01-27 PROCEDURE — 80197 ASSAY OF TACROLIMUS: CPT | Performed by: SURGERY

## 2020-01-27 PROCEDURE — 84100 ASSAY OF PHOSPHORUS: CPT | Performed by: SURGERY

## 2020-01-27 PROCEDURE — 36430 TRANSFUSION BLD/BLD COMPNT: CPT | Mod: ZF

## 2020-01-27 RX ORDER — HEPARIN SODIUM (PORCINE) LOCK FLUSH IV SOLN 100 UNIT/ML 100 UNIT/ML
5 SOLUTION INTRAVENOUS
Status: CANCELLED | OUTPATIENT
Start: 2020-01-27

## 2020-01-27 RX ORDER — HEPARIN SODIUM,PORCINE 10 UNIT/ML
5 VIAL (ML) INTRAVENOUS
Status: CANCELLED | OUTPATIENT
Start: 2020-01-27

## 2020-01-27 ASSESSMENT — PAIN SCALES - GENERAL: PAINLEVEL: NO PAIN (0)

## 2020-01-27 NOTE — PROGRESS NOTES
Blood Product Transfusion Nursing Note:    Frandy Workman presents today to [facility] for a PRBC transfusion.  During today's [facility] appointment orders from Dr. Chino were completed.    Progress note:  ID verified by name and .  Assessment completed.  Vitals were stable throughout time in [facility].    verbal and printed education given to patient/representative regarding transfusion and possible side effects.  Patient/representative verbalized understanding.     present during visit today: Not Applicable.    All pertinent labs reviewed prior to infusion: YES    Date of consent or authorization: 19    Patient tolerated the procedure well    Discharge Plan:    Discharge instructions were reviewed with patient Yes  Patient/representative verbalized understanding of discharge instructions and all questions answered Yes.        Margaret Horn RN    /67 (BP Location: Right arm)   Pulse 80   Temp 98.2  F (36.8  C) (Oral)   Resp 18   SpO2 100%

## 2020-01-27 NOTE — PROGRESS NOTES
Miller Workman is a patient new to me with hepatocellular carcinoma.    He was diagnosed elsewhere sometime ago with at least 2 lesions in his liver with imaging characteristics typical of hepatocellular carcinoma and was treated with ablation.  We had seen him once here several months ago and then 2 months ago he underwent a successful liver transplant.  At the time of explant he had 2 lesions that were mostly necrotic and less than 3 cm with no clearly identifiable vascular invasion.  He has had a difficult recovery after his transplant with problems with confusion, vomiting, cytopenias, and difficulty with management of his blood glucose.  He is here today for planned surveillance for recurrence.  [facility] of [facility] personal care attendant.  He tells me he does very little due to his marked fatigue/deconditioning but feels like that is improving some. He has episodic vomiting and recently underwent an gastric emptying study which showed poor gastric emptying.  He has not noted any bleeding recently, but his hemoglobin has been steadily drifting down and he's scheduled for transfusion later today.  He reports minimal problems with edema.  He developed acute renal failure after his transplant and that is improved but not returned to normal.  He reports no significant pain at present.  He feels like his confusion is resolved.  He feels like his anxiety disorder is better controlled at present, but he still has some minor problems with that.  The remainder of his 10 point review of systems otherwise negative.    On physical exam he appears older than his stated age but otherwise well.  His vital signs are unremarkable.  He has no icterus.  He is edentulous and has no visible lesions in the oropharynx.  He has no adenopathy palpable in his neck or supraclavicular spaces.  His lungs are clear to auscultation without dullness to percussion.  His heart rate and rhythm are regular  without murmur gallop.  His abdomen is soft with minimal upper abdominal tenderness no discretely palpable masses.  His spleen tip is palpable.  He has a trace of pretibial edema bilaterally.  He has no tenderness in his calves or thighs.  His speech is fluent and his cranial nerves are grossly intact.    I reviewed with him his MR scan which shows no evidence of recurrence of his disease within the transplanted liver.  His electrolytes are notable for mildly elevated chloride and a disproportionate elevation of his BUN.  His creatinine of 2 is roughly stable compared to recent values.  His bilirubin, albumin and liver enzymes are all normal.  He has a total white count of 1.5, hemoglobin of 5.7 with an elevated MCV and platelets of 120,000.  His differential from a few days ago shows predominantly lymphopenia.    Assessment/plan:  1.  Hepatocellular carcinoma, now status post liver transplant and without any evidence of disease.  He is at only moderate risk of recurrence and we will plan on seeing him back in about 3 to 4 months with repeat imaging for ongoing surveillance.  2.  Pancytopenia, multifactorial; due to his immunosuppressive therapy, portal hypertension, and chronic kidney disease.  I think he can stop his oral iron.  Checking his reticulocyte count and erythropoietin level will help us make decisions about further treatment, particularly with consideration to give erythropoietin and/or reduce his antirejection therapy as his transplant team feels it is feasible.  3.  Gastric dysmotility due to underlying diabetes.  I am deferring management of this to the other members of his care team.

## 2020-01-27 NOTE — LETTER
1/27/2020      RE: Frandy Workman  [address] [facility] [address]       Miller Workman is a patient new to me with hepatocellular carcinoma.    He was diagnosed elsewhere sometime ago with at least 2 lesions in his liver with imaging characteristics typical of hepatocellular carcinoma and was treated with ablation.  We had seen him once here several months ago and then 2 months ago he underwent a successful liver transplant.  At the time of explant he had 2 lesions that were mostly necrotic and less than 3 cm with no clearly identifiable vascular invasion.  He has had a difficult recovery after his transplant with problems with confusion, vomiting, cytopenias, and difficulty with management of his blood glucose.  He is here today for planned surveillance for recurrence.  He is currently living in a condominium with the help of a personal care attendant.  He tells me he does very little due to his marked fatigue/deconditioning but feels like that is improving some. He has episodic vomiting and recently underwent an gastric emptying study which showed poor gastric emptying.  He has not noted any bleeding recently, but his hemoglobin has been steadily drifting down and he's scheduled for transfusion later today.  He reports minimal problems with edema.  He developed acute renal failure after his transplant and that is improved but not returned to normal.  He reports no significant pain at present.  He feels like his confusion is resolved.  He feels like his anxiety disorder is better controlled at present, but he still has some minor problems with that.  The remainder of his 10 point review of systems otherwise negative.    On physical exam he appears older than his stated age but otherwise well.  His vital signs are unremarkable.  He has no icterus.  He is edentulous and has no visible lesions in the oropharynx.  He has no adenopathy palpable in his neck or supraclavicular spaces.  His lungs are clear to  auscultation without dullness to percussion.  His heart rate and rhythm are regular without murmur gallop.  His abdomen is soft with minimal upper abdominal tenderness no discretely palpable masses.  His spleen tip is palpable.  He has a trace of pretibial edema bilaterally.  He has no tenderness in his calves or thighs.  His speech is fluent and his cranial nerves are grossly intact.    I reviewed with him his MR scan which shows no evidence of recurrence of his disease within the transplanted liver.  His electrolytes are notable for mildly elevated chloride and a disproportionate elevation of his BUN.  His creatinine of 2 is roughly stable compared to recent values.  His bilirubin, albumin and liver enzymes are all normal.  He has a total white count of 1.5, hemoglobin of 5.7 with an elevated MCV and platelets of 120,000.  His differential from a few days ago shows predominantly lymphopenia.    Assessment/plan:  1.  Hepatocellular carcinoma, now status post liver transplant and without any evidence of disease.  He is at only moderate risk of recurrence and we will plan on seeing him back in about 3 to 4 months with repeat imaging for ongoing surveillance.  2.  Pancytopenia, multifactorial; due to his immunosuppressive therapy, portal hypertension, and chronic kidney disease.  I think he can stop his oral iron.  Checking his reticulocyte count and erythropoietin level will help us make decisions about further treatment, particularly with consideration to give erythropoietin and/or reduce his antirejection therapy as his transplant team feels it is feasible.  3.  Gastric dysmotility due to underlying diabetes.  I am deferring management of this to the other members of his care team.    Miguel Villarreal MD

## 2020-01-27 NOTE — NURSING NOTE
"Oncology Rooming Note    January 27, 2020 10:02 AM   Frandy Workman is a 54 year old male who presents for:    Chief Complaint   Patient presents with     Oncology Clinic Visit     Pt is here for a rtn for HCC      Initial Vitals: Blood Pressure 111/68   Pulse 94   Temperature 97.7  F (36.5  C) (Oral)   Respiration 18   Weight 73.1 kg (161 lb 3.2 oz)   Oxygen Saturation 100%   Body Mass Index 23.46 kg/m   Estimated body mass index is 23.46 kg/m  as calculated from the following:    Height as of 1/9/20: 1.765 m (5' 9.5\").    Weight as of this encounter: 73.1 kg (161 lb 3.2 oz). Body surface area is 1.89 meters squared.  No Pain (0) Comment: Data Unavailable   No LMP for male patient.  Allergies reviewed: Yes  Medications reviewed: Yes    Medications: Medication refills not needed today.  Pharmacy name entered into [employer]:    [id number] [place], [place] - [address] [id number] [place], [place] - [address] [facility] - [place] - [address]  [id number] - [place], [place] - [address]  [facility] - [place], [place] - [address] [facility] [address]  [facility] - [place], [place] - [address]    Clinical concerns: none       Helen Jacome MA            "

## 2020-01-27 NOTE — PATIENT INSTRUCTIONS
Dear Frandy Workman    Thank you for choosing [facility] ([facility]) for your infusion.  The following information is a summary of our appointment as well as important reminders.      You received 1 unit of PRBC today       We look forward in seeing you on your next appointment here at [facility] ([facility]).  Please don t hesitate to call us at [phone] to reschedule any of your appointments or to speak with one of the [facility] registered nurses.  It was a pleasure taking care of you today.    Sincerely,    [facility]  [address] [place] [address]  [address]  Phone:  [phone]    HOME CARE FOR PATIENTS RECEIVING BLOOD PRODUCTS    You have just received a blood product.  During the next 48 hours please be aware of the following symptoms of a blood product reaction.    The possible symptoms of a blood reaction are:      Chills    Fever temperature above 100.0 orally    Headache    Nausea    Persistent non-productive cough    Hives    Itching    Facial swelling or flushing    Difficulty breathing or wheezing    Bloody urine      If you experience any of these symptoms contact:    [phone]  Emergency Room    [phone]  Transplant Center  7:00 am - 6:30 pm     If you do not live locally you should contact your local physician.

## 2020-01-28 ENCOUNTER — TELEPHONE (OUTPATIENT)
Dept: TRANSPLANT | Facility: CLINIC | Age: 55
End: 2020-01-28

## 2020-01-28 ENCOUNTER — INFUSION THERAPY VISIT (OUTPATIENT)
Dept: INFUSION THERAPY | Facility: CLINIC | Age: 55
End: 2020-01-28
Attending: SURGERY
Payer: MEDICARE

## 2020-01-28 VITALS
TEMPERATURE: 98.2 F | HEART RATE: 73 BPM | DIASTOLIC BLOOD PRESSURE: 64 MMHG | OXYGEN SATURATION: 98 % | RESPIRATION RATE: 18 BRPM | SYSTOLIC BLOOD PRESSURE: 114 MMHG

## 2020-01-28 DIAGNOSIS — N18.30 ANEMIA DUE TO STAGE 3 CHRONIC KIDNEY DISEASE (H): Primary | ICD-10-CM

## 2020-01-28 DIAGNOSIS — D63.1 ANEMIA DUE TO STAGE 3 CHRONIC KIDNEY DISEASE (H): Primary | ICD-10-CM

## 2020-01-28 DIAGNOSIS — D64.9 ANEMIA: ICD-10-CM

## 2020-01-28 DIAGNOSIS — Z94.4 LIVER TRANSPLANT RECIPIENT (H): Primary | ICD-10-CM

## 2020-01-28 LAB
ABO + RH BLD: NORMAL
ABO + RH BLD: NORMAL
BLD GP AB SCN SERPL QL: NORMAL
BLD PROD TYP BPU: NORMAL
BLD PROD TYP BPU: NORMAL
BLD UNIT ID BPU: 0
BLOOD BANK CMNT PATIENT-IMP: NORMAL
BLOOD PRODUCT CODE: NORMAL
BPU ID: NORMAL
EPO SERPL-ACNC: 1308 MU/ML (ref 4–27)
NUM BPU REQUESTED: 2
SPECIMEN EXP DATE BLD: NORMAL
TRANSFUSION STATUS PATIENT QL: NORMAL
TRANSFUSION STATUS PATIENT QL: NORMAL

## 2020-01-28 PROCEDURE — P9016 RBC LEUKOCYTES REDUCED: HCPCS | Performed by: TRANSPLANT SURGERY

## 2020-01-28 PROCEDURE — 36430 TRANSFUSION BLD/BLD COMPNT: CPT

## 2020-01-28 NOTE — PROGRESS NOTES
Blood Product Transfusion Nursing Note:    Frandy Workman presents today to [facility] for a PRBC transfusion.  During today's [facility] appointment orders from Dr. Chino were completed.    Progress note:  ID verified by name and .  Assessment completed.  Vitals were stable throughout time in [facility].    verbal and printed education given to patient/representative regarding transfusion and possible side effects.  Patient/representative verbalized understanding.     present during visit today: Not Applicable.    All pertinent labs reviewed prior to infusion: YES    Date of consent or authorization: 19    Patient tolerated the procedure well    Discharge Plan:    Discharge instructions were reviewed with patient Yes  Patient/representative verbalized understanding of discharge instructions and all questions answered Yes.        Margaret Horn RN    BP 96/43 (BP Location: Left arm)   Pulse 85   Temp 97.6  F (36.4  C) (Oral)   Resp 18   SpO2 98%

## 2020-01-28 NOTE — PATIENT INSTRUCTIONS
Dear Frandy Workman    Thank you for choosing [facility] ([facility]) for your infusion.  The following information is a summary of our appointment as well as important reminders.      You received 1 unit of PRBC today       We look forward in seeing you on your next appointment here at [facility] ([facility]).  Please don t hesitate to call us at [phone] to reschedule any of your appointments or to speak with one of the [facility] registered nurses.  It was a pleasure taking care of you today.    Sincerely,    [facility]  [address] [place] [address]  [address]  Phone:  [phone]    HOME CARE FOR PATIENTS RECEIVING BLOOD PRODUCTS    You have just received a blood product.  During the next 48 hours please be aware of the following symptoms of a blood product reaction.    The possible symptoms of a blood reaction are:      Chills    Fever temperature above 100.0 orally    Headache    Nausea    Persistent non-productive cough    Hives    Itching    Facial swelling or flushing    Difficulty breathing or wheezing    Bloody urine      If you experience any of these symptoms contact:    [phone]  Emergency Room    [phone]  Transplant Center  7:00 am - 6:30 pm     If you do not live locally you should contact your local physician.

## 2020-01-29 ENCOUNTER — OFFICE VISIT (OUTPATIENT)
Dept: OPHTHALMOLOGY | Facility: CLINIC | Age: 55
End: 2020-01-29
Attending: OPHTHALMOLOGY
Payer: MEDICARE

## 2020-01-29 DIAGNOSIS — E11.3313 TYPE 2 DIABETES MELLITUS WITH MODERATE NONPROLIFERATIVE RETINOPATHY OF BOTH EYES AND MACULAR EDEMA, UNSPECIFIED WHETHER LONG TERM INSULIN USE (H): Primary | ICD-10-CM

## 2020-01-29 DIAGNOSIS — H04.123 DRY EYES, BILATERAL: ICD-10-CM

## 2020-01-29 DIAGNOSIS — Z94.4 STATUS POST LIVER TRANSPLANT (H): ICD-10-CM

## 2020-01-29 DIAGNOSIS — N18.30 CKD (CHRONIC KIDNEY DISEASE), STAGE III (H): ICD-10-CM

## 2020-01-29 DIAGNOSIS — H35.81 RETINAL EDEMA: ICD-10-CM

## 2020-01-29 DIAGNOSIS — D84.9 IMMUNOSUPPRESSED STATUS (H): ICD-10-CM

## 2020-01-29 PROCEDURE — G0463 HOSPITAL OUTPT CLINIC VISIT: HCPCS | Mod: ZF

## 2020-01-29 PROCEDURE — 92134 CPTRZ OPH DX IMG PST SGM RTA: CPT | Mod: ZF | Performed by: OPHTHALMOLOGY

## 2020-01-29 ASSESSMENT — SLIT LAMP EXAM - LIDS
COMMENTS: NORMAL
COMMENTS: SMALL PAPILLOMA ALONG LL MARGIN

## 2020-01-29 ASSESSMENT — REFRACTION_WEARINGRX
OD_ADD: +2.00
SPECS_TYPE: PAL
OD_CYLINDER: +0.75
OS_CYLINDER: +0.75
OS_AXIS: 040
OS_SPHERE: -7.25
OS_ADD: +2.00
OD_AXIS: 132
OD_SPHERE: -7.25

## 2020-01-29 ASSESSMENT — EXTERNAL EXAM - RIGHT EYE: OD_EXAM: NORMAL

## 2020-01-29 ASSESSMENT — EXTERNAL EXAM - LEFT EYE: OS_EXAM: NORMAL

## 2020-01-29 ASSESSMENT — VISUAL ACUITY
METHOD: SNELLEN - LINEAR
OS_PH_CC: 20/40
CORRECTION_TYPE: GLASSES
OS_PH_CC+: +1
OS_CC: 20/40
OD_CC: 20/30

## 2020-01-29 ASSESSMENT — CONF VISUAL FIELD
OD_NORMAL: 1
OS_NORMAL: 1

## 2020-01-29 ASSESSMENT — TONOMETRY
OS_IOP_MMHG: 16
IOP_METHOD: TONOPEN
OD_IOP_MMHG: 18

## 2020-01-29 ASSESSMENT — CUP TO DISC RATIO
OD_RATIO: 0.25
OS_RATIO: 0.3

## 2020-01-29 NOTE — PROGRESS NOTES
CC:  Follow up Diabetic macular edema  left eye     HPI: Frandy Workman is a  54 year old year-old patient with history of Diabetes mellitus for 24 ys . Patient on insulin.  Patient was evaluated by dr. Amezquita and sent to the retina clinic for management of Diabetic macular edema     Interval History: VA in right eye seems worsened (he associates this w/ allergies), left eye stable. No new flashes and floaters. Last Hemoglobin A1c was 5.1% on 12/2/19.    He had a liver transplant on 11/11/2019 and has been inpatient for ~2 months. He is on immunosuppressive medications. He is anemic and received 2 packs of blood this week. He says his kidney is not working well.     Retinal Imaging:  OCT 1-29-20  RE: recurrent diffuse Diabetic macular edema  LE: recurrent diffuse DME with more involvement at fovea; small area of PAMM inferotemporal to fovea    fluorescein angiography transits right eye 9-5-19  Right eye: diffuse microaneurysms, late mild macular leakage; mild peripheral capillary non perfusion;  mild vessel leakage in late phase, no NVD/NVE  Left eye: diffuse microaneurysms, late mild macula leakage; mild peripheral capillary non perfusion;  mild vessel leakage in late phase, no NVD/NVE    Optos consistent with clinical exam    Assessment & Plan:    0. Status post liver transplant   -severe anemia; s/p transfusion   - being seen by his primary team    1.  Moderate nonproliferative diabetic retinopathy of both eyes    - exam stable with No NV on exam each eye    - last FA 9/5/19 improved leakage on fluorescein angiography compared to march, 2019   - Blood pressure (<120/80) and blood glucose (HbA1c <7.0) control discussed with patient. Patient advised that failure to adequately control each may lead to vision loss. The patient expressed understanding.       2. Diabetic macular edema both eyes    - status post avastin x 4. Switch to Eylea 4/24/19 (s/p 3 injections) with improvement of Diabetic macular edema    -  now with resolution of Diabetic macular edema.   - Last Eylea injection was 6/27/19 (15 weeks)   - patient has increased diffuse and cystic edema in both eyes; also a small area of possible PAMM left eye but patient has two many systemic issues now for a repeat FA;    - consider Eylea each eye vs observe closely for 4 w to see if edema improves as his anemia and systemic condition improves          3. Myopia, bilateral  Prescription given today (10/9/19)     4. Senile nuclear sclerosis, bilateral  Comment: Not visually significant OU    5. Dry eye syndrome   Left eye worse than right eye   warm compresses and artificial tears  As needed  -punctal plugs previously left eye - reports this is better     Plan: follow up 4 weeks for Optical Coherence Tomography both eyes and possible Eylea each eye and prescription .       Everardo Corona MD  Vitreoretinal surgery fellow  [facility]    ~~~~~~~~~~~~~~~~~~~~~~~~~~~~~~~~~~   Complete documentation of historical and exam elements from today's encounter can be found in the full encounter summary report (not reduplicated in this progress note).  I personally obtained the chief complaint(s) and history of present illness.  I confirmed and edited as necessary the review of systems, past medical/surgical history, family history, social history, and examination findings as documented by others; and I examined the patient myself.  I personally reviewed the relevant tests, images, and reports as documented above.  I personally reviewed the ophthalmic test(s) associated with this encounter, agree with the interpretation(s) as documented by the resident/fellow, and have edited the corresponding report(s) as necessary.   I formulated and edited as necessary the assessment and plan and discussed the findings and management plan with the patient and family    Enriqueta Martin MD   of Ophthalmology.  Retina Service   Department of Ophthalmology and  Visual Neurosciences   [facility]  Phone: [phone]   Fax: [phone]

## 2020-01-30 LAB
ALBUMIN SERPL-MCNC: 4.3 G/DL (ref 3.4–5)
ALP SERPL-CCNC: 110 U/L (ref 40–150)
ALT SERPL W P-5'-P-CCNC: 18 U/L (ref 0–70)
ANION GAP SERPL CALCULATED.3IONS-SCNC: 5 MMOL/L (ref 3–14)
AST SERPL W P-5'-P-CCNC: 13 U/L (ref 0–45)
BILIRUB DIRECT SERPL-MCNC: 0.1 MG/DL (ref 0–0.2)
BILIRUB SERPL-MCNC: 0.5 MG/DL (ref 0.2–1.3)
BUN SERPL-MCNC: 52 MG/DL (ref 7–30)
CALCIUM SERPL-MCNC: 8.8 MG/DL (ref 8.5–10.1)
CHLORIDE SERPL-SCNC: 113 MMOL/L (ref 94–109)
CO2 SERPL-SCNC: 22 MMOL/L (ref 20–32)
CREAT SERPL-MCNC: 1.86 MG/DL (ref 0.66–1.25)
ERYTHROCYTE [DISTWIDTH] IN BLOOD BY AUTOMATED COUNT: 20.4 % (ref 10–15)
GFR SERPL CREATININE-BSD FRML MDRD: 40 ML/MIN/{1.73_M2}
GLUCOSE SERPL-MCNC: 104 MG/DL (ref 70–99)
HCT VFR BLD AUTO: 24.1 % (ref 40–53)
HGB BLD-MCNC: 8.2 G/DL (ref 13.3–17.7)
MAGNESIUM SERPL-MCNC: 2.2 MG/DL (ref 1.6–2.3)
MCH RBC QN AUTO: 32.4 PG (ref 26.5–33)
MCHC RBC AUTO-ENTMCNC: 34 G/DL (ref 31.5–36.5)
MCV RBC AUTO: 95 FL (ref 78–100)
PHOSPHATE SERPL-MCNC: 2.9 MG/DL (ref 2.5–4.5)
PLATELET # BLD AUTO: 133 10E9/L (ref 150–450)
POTASSIUM SERPL-SCNC: 5.2 MMOL/L (ref 3.4–5.3)
PROT SERPL-MCNC: 7.6 G/DL (ref 6.8–8.8)
RBC # BLD AUTO: 2.53 10E12/L (ref 4.4–5.9)
SODIUM SERPL-SCNC: 140 MMOL/L (ref 133–144)
TACROLIMUS BLD-MCNC: 7.8 UG/L (ref 5–15)
TME LAST DOSE: NORMAL H
WBC # BLD AUTO: 1.4 10E9/L (ref 4–11)

## 2020-01-30 PROCEDURE — 80048 BASIC METABOLIC PNL TOTAL CA: CPT | Performed by: SURGERY

## 2020-01-30 PROCEDURE — 80076 HEPATIC FUNCTION PANEL: CPT | Performed by: SURGERY

## 2020-01-30 PROCEDURE — 84100 ASSAY OF PHOSPHORUS: CPT | Performed by: SURGERY

## 2020-01-30 PROCEDURE — 85027 COMPLETE CBC AUTOMATED: CPT | Performed by: SURGERY

## 2020-01-30 PROCEDURE — 83735 ASSAY OF MAGNESIUM: CPT | Performed by: SURGERY

## 2020-01-30 PROCEDURE — 80197 ASSAY OF TACROLIMUS: CPT | Performed by: SURGERY

## 2020-01-31 ENCOUNTER — TELEPHONE (OUTPATIENT)
Dept: TRANSPLANT | Facility: CLINIC | Age: 55
End: 2020-01-31

## 2020-01-31 ENCOUNTER — TELEPHONE (OUTPATIENT)
Dept: NUTRITION | Facility: CLINIC | Age: 55
End: 2020-01-31

## 2020-01-31 DIAGNOSIS — Z94.4 STATUS POST LIVER TRANSPLANTATION (H): Primary | ICD-10-CM

## 2020-01-31 DIAGNOSIS — B19.10 HEPATITIS B: ICD-10-CM

## 2020-01-31 RX ORDER — METOCLOPRAMIDE 5 MG/1
5 TABLET ORAL
Qty: 28 TABLET | Refills: 0 | Status: SHIPPED | OUTPATIENT
Start: 2020-01-31 | End: 2020-02-25

## 2020-01-31 RX ORDER — VALGANCICLOVIR 450 MG/1
450 TABLET, FILM COATED ORAL EVERY OTHER DAY
Qty: 15 TABLET | Refills: 1 | Status: SHIPPED | OUTPATIENT
Start: 2020-01-31 | End: 2020-05-12

## 2020-01-31 RX ORDER — LAMIVUDINE 100 MG/1
TABLET, FILM COATED ORAL
Qty: 15 TABLET | Refills: 1 | Status: SHIPPED | OUTPATIENT
Start: 2020-01-31 | End: 2020-02-05

## 2020-01-31 RX ORDER — TENOFOVIR DISOPROXIL FUMARATE 300 MG/1
300 TABLET, FILM COATED ORAL EVERY OTHER DAY
Qty: 15 TABLET | Refills: 11 | Status: SHIPPED | OUTPATIENT
Start: 2020-01-31 | End: 2020-02-05

## 2020-01-31 NOTE — TELEPHONE ENCOUNTER
Discussed Viread with Dr Mary Latif and DR Banuelos.  Patient to switch to Lamivudine 50 mg every 24 hours due to creatinine clearance being < 50. If creatinine improved patient to increase to 100 mg daily.   Pt will stop viread once lamivudine arrives.     Left detailed message for patient of the plan and how to spell new medication. Patient encouraged to call back.

## 2020-01-31 NOTE — TELEPHONE ENCOUNTER
Nutrition Services:     Called Miller today per his request to speak with a dietitian from oncology distress screening.    He reports that he doesn't cook and tends to eat convenience meals such as [employer].   He tries to focus on getting a lot of protein for recovery since he recently had a liver transplant (11/11/19).    He is looking for ideas for healthier foods/meals and simple recipes since he really does not like to cook.    He is not interested in [employer] [place] as he [place] not live in the delivery region and does not drive.     RD sent Miller educational resources in the mail and answered his questions.    Mailed: Sources of Protein, Small frequent meals and recipes for healthy-batch cooked meals.     Provided Miller with RD contact information for further inquiry as needed.     Maggie Perez RDN, [facility] [place]  [phone]

## 2020-01-31 NOTE — TELEPHONE ENCOUNTER
Spoke with patient. Vomiting is improving and decreasing in frequency. Patient is still having issues with hard stools. Patient is not using miralax. Patient will use miralax every day to every other day based on his stool. Patient to do an occult blood stool.     Patient creatine clearance reviewed with pharmacist, Donald, and patient to decrease viread to every other day. If creatinine goes below 1.85 then patient will go back to daily dosing.  Discussed with Carla HEATH Pt wbc decreasing so valcyte to every other day dosing and CMV to be drawn.   Pt wrote down medication changes and repeated back.

## 2020-02-04 ENCOUNTER — MEDICAL CORRESPONDENCE (OUTPATIENT)
Dept: HEALTH INFORMATION MANAGEMENT | Facility: CLINIC | Age: 55
End: 2020-02-04

## 2020-02-04 ENCOUNTER — TELEPHONE (OUTPATIENT)
Dept: TRANSPLANT | Facility: CLINIC | Age: 55
End: 2020-02-04

## 2020-02-04 ENCOUNTER — INFUSION THERAPY VISIT (OUTPATIENT)
Dept: INFUSION THERAPY | Facility: CLINIC | Age: 55
End: 2020-02-04
Attending: SURGERY
Payer: MEDICARE

## 2020-02-04 VITALS
TEMPERATURE: 98.2 F | OXYGEN SATURATION: 99 % | SYSTOLIC BLOOD PRESSURE: 130 MMHG | HEART RATE: 82 BPM | DIASTOLIC BLOOD PRESSURE: 66 MMHG

## 2020-02-04 DIAGNOSIS — Z12.11 ENCOUNTER FOR SCREENING FOR MALIGNANT NEOPLASM OF COLON: ICD-10-CM

## 2020-02-04 DIAGNOSIS — N18.30 ANEMIA DUE TO STAGE 3 CHRONIC KIDNEY DISEASE (H): ICD-10-CM

## 2020-02-04 DIAGNOSIS — D64.9 LOW HEMOGLOBIN: ICD-10-CM

## 2020-02-04 DIAGNOSIS — D64.9 ANEMIA, UNSPECIFIED TYPE: Primary | ICD-10-CM

## 2020-02-04 DIAGNOSIS — Z12.5 ENCOUNTER FOR SCREENING FOR MALIGNANT NEOPLASM OF PROSTATE: ICD-10-CM

## 2020-02-04 DIAGNOSIS — N18.30 CKD (CHRONIC KIDNEY DISEASE), STAGE III (H): Primary | ICD-10-CM

## 2020-02-04 DIAGNOSIS — D63.1 ANEMIA DUE TO STAGE 3 CHRONIC KIDNEY DISEASE (H): ICD-10-CM

## 2020-02-04 LAB
ABO + RH BLD: NORMAL
ABO + RH BLD: NORMAL
ALBUMIN SERPL-MCNC: 3.2 G/DL (ref 3.4–5)
ALP SERPL-CCNC: 112 U/L (ref 40–150)
ALT SERPL W P-5'-P-CCNC: 19 U/L (ref 0–70)
ANION GAP SERPL CALCULATED.3IONS-SCNC: 6 MMOL/L (ref 3–14)
AST SERPL W P-5'-P-CCNC: 14 U/L (ref 0–45)
BILIRUB DIRECT SERPL-MCNC: <0.1 MG/DL (ref 0–0.2)
BILIRUB SERPL-MCNC: 0.5 MG/DL (ref 0.2–1.3)
BLD GP AB SCN SERPL QL: NORMAL
BLD PROD TYP BPU: NORMAL
BLOOD BANK CMNT PATIENT-IMP: NORMAL
BUN SERPL-MCNC: 49 MG/DL (ref 7–30)
CALCIUM SERPL-MCNC: 9 MG/DL (ref 8.5–10.1)
CHLORIDE SERPL-SCNC: 116 MMOL/L (ref 94–109)
CO2 SERPL-SCNC: 22 MMOL/L (ref 20–32)
CREAT SERPL-MCNC: 1.77 MG/DL (ref 0.66–1.25)
ERYTHROCYTE [DISTWIDTH] IN BLOOD BY AUTOMATED COUNT: 20.4 % (ref 10–15)
GFR SERPL CREATININE-BSD FRML MDRD: 42 ML/MIN/{1.73_M2}
GLUCOSE SERPL-MCNC: 103 MG/DL (ref 70–99)
HCT VFR BLD AUTO: 20.1 % (ref 40–53)
HGB BLD-MCNC: 6.9 G/DL (ref 13.3–17.7)
MAGNESIUM SERPL-MCNC: 2.1 MG/DL (ref 1.6–2.3)
MCH RBC QN AUTO: 32.2 PG (ref 26.5–33)
MCHC RBC AUTO-ENTMCNC: 34.3 G/DL (ref 31.5–36.5)
MCV RBC AUTO: 94 FL (ref 78–100)
NUM BPU REQUESTED: 1
PHOSPHATE SERPL-MCNC: 3.1 MG/DL (ref 2.5–4.5)
PLATELET # BLD AUTO: 124 10E9/L (ref 150–450)
POTASSIUM SERPL-SCNC: 4.9 MMOL/L (ref 3.4–5.3)
PROT SERPL-MCNC: 7.1 G/DL (ref 6.8–8.8)
RBC # BLD AUTO: 2.14 10E12/L (ref 4.4–5.9)
SODIUM SERPL-SCNC: 144 MMOL/L (ref 133–144)
SPECIMEN EXP DATE BLD: NORMAL
TACROLIMUS BLD-MCNC: 6.2 UG/L (ref 5–15)
TME LAST DOSE: NORMAL H
WBC # BLD AUTO: 1.5 10E9/L (ref 4–11)

## 2020-02-04 PROCEDURE — 80197 ASSAY OF TACROLIMUS: CPT | Performed by: SURGERY

## 2020-02-04 PROCEDURE — 80048 BASIC METABOLIC PNL TOTAL CA: CPT | Performed by: SURGERY

## 2020-02-04 PROCEDURE — 80076 HEPATIC FUNCTION PANEL: CPT | Performed by: SURGERY

## 2020-02-04 PROCEDURE — 25800030 ZZH RX IP 258 OP 636: Mod: ZF | Performed by: SURGERY

## 2020-02-04 PROCEDURE — 83735 ASSAY OF MAGNESIUM: CPT | Performed by: SURGERY

## 2020-02-04 PROCEDURE — 86900 BLOOD TYPING SEROLOGIC ABO: CPT | Performed by: NURSE PRACTITIONER

## 2020-02-04 PROCEDURE — 86850 RBC ANTIBODY SCREEN: CPT | Performed by: NURSE PRACTITIONER

## 2020-02-04 PROCEDURE — 96360 HYDRATION IV INFUSION INIT: CPT

## 2020-02-04 PROCEDURE — 85027 COMPLETE CBC AUTOMATED: CPT | Performed by: SURGERY

## 2020-02-04 PROCEDURE — 86901 BLOOD TYPING SEROLOGIC RH(D): CPT | Performed by: NURSE PRACTITIONER

## 2020-02-04 PROCEDURE — 84100 ASSAY OF PHOSPHORUS: CPT | Performed by: SURGERY

## 2020-02-04 PROCEDURE — 86923 COMPATIBILITY TEST ELECTRIC: CPT | Performed by: NURSE PRACTITIONER

## 2020-02-04 RX ORDER — HEPARIN SODIUM,PORCINE 10 UNIT/ML
5 VIAL (ML) INTRAVENOUS
Status: CANCELLED | OUTPATIENT
Start: 2020-02-04

## 2020-02-04 RX ORDER — HEPARIN SODIUM (PORCINE) LOCK FLUSH IV SOLN 100 UNIT/ML 100 UNIT/ML
5 SOLUTION INTRAVENOUS
Status: CANCELLED | OUTPATIENT
Start: 2020-02-04

## 2020-02-04 RX ADMIN — SODIUM CHLORIDE 1000 ML: 9 INJECTION, SOLUTION INTRAVENOUS at 13:05

## 2020-02-04 NOTE — TELEPHONE ENCOUNTER
DATE:  2/4/2020   TIME OF RECEIPT FROM LAB:  3799  LAB TEST:  hgb  LAB VALUE:  6.9  RESULTS GIVEN WITH READ-BACK TO :Radha Ndiaye  TIME LAB VALUE REPORTED TO PROVIDER:   6670

## 2020-02-04 NOTE — PROGRESS NOTES
Nursing Note  Frandy Workman [facility]:   Chief Complaint   Patient presents with     Infusion     fluids     During today's [facility] appointment, orders from Dr. Ramos were completed.  Frequency: weekly    Progress note:  Patient identification verified by name and date of birth.  Assessment completed.  Vitals recorded in Doc Flowsheets.  Patient was provided with education regarding medication/procedure and possible side effects.  Patient verbalized understanding.     present during visit today: Not Applicable.    Treatment Conditions: Non-applicable. Hgb noted to be 6.9 from this morning. Writer was unable to contact coordinator so Carla Pineda was paged. Orders received for type and screen and 1 unit PRBCs. Unit of blood will not be available in the [facility] until 1800, not allowing enough time to transfuse within clinic hours. Pt was rescheduled for noon tomorrow. Provider Miriam updated.     Premedications: were not ordered.    Drug Waste Record: No    Infusion length and rate:  infusion given over approximately 60 minutes  999 ml/hr.    Labs: were drawn prior to appointment by homecare per pt.    Vascular access: peripheral IV placed today.    Post Infusion Assessment:  Patient tolerated infusion without incident.     Discharge Plan:   Follow up plan of care with: ongoing infusions at [facility]. and primary care provider,  Discharge instructions were reviewed with patient.  Patient/representative verbalized understanding of discharge instructions and all questions answered.  [facility] discharged [facility] in stable condition.    Becky Piper RN      Administrations This Visit     0.9% sodium chloride BOLUS     Admin Date  02/04/2020 Action  New Bag Dose  1000 mL Route  Intravenous Administered By  Becky Piper RN                /66   Pulse  82   Temp 98.2  F (36.8  C) (Oral)   SpO2 99%        [address] [place] [id number]

## 2020-02-04 NOTE — PROGRESS NOTES
"Blood Product Transfusion Nursing Note:    Frandy Workman presents today to [facility] for a 1 unit PRBC transfusion.  During today's [facility] appointment orders from   were completed.    Progress note:  ID verified by name and .  Assessment completed.  Vitals were stable throughout time in [facility].  {VERBAL/printed:807925} education given to patient/representative regarding transfusion and possible side effects.  Patient/representative verbalized understanding.    Type and Crossmatch completed on: 20      All pertinent labs reviewed prior to infusion: YES     Blood Product order verified and double checked for accuracy: YES  2nd : ***    Date of consent or authorization: ***    Patient tolerated the procedure {TOLERATE:264124:s}    Administrations This Visit     0.9% sodium chloride BOLUS     Admin Date  2020 Action  New Bag Dose  1000 mL Route  Intravenous Administered By  Becky Piper RN                /66   Pulse 82   Temp 98.2  F (36.8  C) (Oral)   SpO2 99%     Transfusion given over approximately  ***     Note: ***    Discharge Plan:    Discharge instructions were reviewed with patient { :893576::\"No\"}  Patient/representative verbalized understanding of discharge instructions and all questions answered { :403256::\"No\"}.    Discharged from [facility] at *** with *** to ***.    Becky Piper RN        "

## 2020-02-05 ENCOUNTER — TELEPHONE (OUTPATIENT)
Dept: TRANSPLANT | Facility: CLINIC | Age: 55
End: 2020-02-05

## 2020-02-05 ENCOUNTER — INFUSION THERAPY VISIT (OUTPATIENT)
Dept: INFUSION THERAPY | Facility: CLINIC | Age: 55
End: 2020-02-05
Attending: NURSE PRACTITIONER
Payer: MEDICARE

## 2020-02-05 VITALS
TEMPERATURE: 98.2 F | RESPIRATION RATE: 18 BRPM | DIASTOLIC BLOOD PRESSURE: 76 MMHG | OXYGEN SATURATION: 99 % | SYSTOLIC BLOOD PRESSURE: 133 MMHG

## 2020-02-05 DIAGNOSIS — N18.30 ANEMIA DUE TO STAGE 3 CHRONIC KIDNEY DISEASE (H): ICD-10-CM

## 2020-02-05 DIAGNOSIS — D63.1 ANEMIA DUE TO STAGE 3 CHRONIC KIDNEY DISEASE (H): ICD-10-CM

## 2020-02-05 DIAGNOSIS — B19.10 HEPATITIS B: ICD-10-CM

## 2020-02-05 DIAGNOSIS — D64.9 LOW HEMOGLOBIN: Primary | ICD-10-CM

## 2020-02-05 DIAGNOSIS — Z94.4 LIVER TRANSPLANT RECIPIENT (H): ICD-10-CM

## 2020-02-05 DIAGNOSIS — Z94.4 STATUS POST LIVER TRANSPLANTATION (H): ICD-10-CM

## 2020-02-05 LAB
BLD PROD TYP BPU: NORMAL
BLD UNIT ID BPU: 0
BLOOD PRODUCT CODE: NORMAL
BPU ID: NORMAL
TRANSFUSION STATUS PATIENT QL: NORMAL
TRANSFUSION STATUS PATIENT QL: NORMAL

## 2020-02-05 PROCEDURE — P9016 RBC LEUKOCYTES REDUCED: HCPCS | Performed by: NURSE PRACTITIONER

## 2020-02-05 PROCEDURE — 36430 TRANSFUSION BLD/BLD COMPNT: CPT

## 2020-02-05 RX ORDER — LAMIVUDINE 100 MG/1
100 TABLET, FILM COATED ORAL DAILY
Qty: 30 TABLET | Refills: 3 | Status: SHIPPED | OUTPATIENT
Start: 2020-02-05 | End: 2020-07-09

## 2020-02-05 RX ORDER — SULFAMETHOXAZOLE AND TRIMETHOPRIM 400; 80 MG/1; MG/1
1 TABLET ORAL
Qty: 30 TABLET | Refills: 11 | Status: SHIPPED | OUTPATIENT
Start: 2020-02-06 | End: 2020-05-12

## 2020-02-05 NOTE — PROGRESS NOTES
Blood Product Transfusion Nursing Note:    Frandy Workman presents today to [facility] for a 1 unit PRBCs transfusion.  During today's [facility] appointment orders from Carla Pineda were completed.    Progress note:  ID verified by name and .  Assessment completed.  Vitals were stable throughout time in [facility].  verbal and printed education given to patient/representative regarding transfusion and possible side effects.  Patient/representative verbalized understanding.    Type and Crossmatch completed on: 20    All pertinent labs reviewed prior to infusion: YES2    Blood Product order verified and double checked for accuracy: YES  2nd : Trish Jaquez RN     Date of consent or authorization: 19    Patient tolerated the procedure well        /63   Temp 98.2  F (36.8  C) (Oral)   Resp 18   SpO2 99%     Transfusion given over approximately  1.5hrs         Discharge Plan:    Discharge instructions were reviewed with patient No  Patient/representative verbalized understanding of discharge instructions and all questions answered No.    Discharged from [facility] at 1330 with self to home.    Becky iPper RN

## 2020-02-05 NOTE — TELEPHONE ENCOUNTER
Pt's creatinine clearance improved. Patient to change Epivir to 100 mg daily per Dr Banuelos discussion. Pt repeated and wrote down changes.    Pt wbc remains low so bactrim will be decreased to Monday and Thursday only. Pt repeated dose change.

## 2020-02-06 ENCOUNTER — TELEPHONE (OUTPATIENT)
Dept: GASTROENTEROLOGY | Facility: CLINIC | Age: 55
End: 2020-02-06

## 2020-02-06 ENCOUNTER — OFFICE VISIT (OUTPATIENT)
Dept: ENDOCRINOLOGY | Facility: CLINIC | Age: 55
End: 2020-02-06
Payer: MEDICARE

## 2020-02-06 VITALS
HEIGHT: 69 IN | SYSTOLIC BLOOD PRESSURE: 117 MMHG | DIASTOLIC BLOOD PRESSURE: 71 MMHG | WEIGHT: 158.5 LBS | BODY MASS INDEX: 23.47 KG/M2 | HEART RATE: 76 BPM

## 2020-02-06 DIAGNOSIS — E11.3293 TYPE 2 DIABETES MELLITUS WITH MILD NONPROLIFERATIVE RETINOPATHY OF BOTH EYES WITHOUT MACULAR EDEMA, UNSPECIFIED WHETHER LONG TERM INSULIN USE (H): Primary | ICD-10-CM

## 2020-02-06 DIAGNOSIS — D64.9 ANEMIA, UNSPECIFIED: Primary | ICD-10-CM

## 2020-02-06 LAB
ALBUMIN SERPL-MCNC: 4.6 G/DL (ref 3.4–5)
ALP SERPL-CCNC: 107 U/L (ref 40–150)
ALT SERPL W P-5'-P-CCNC: 22 U/L (ref 0–70)
ANION GAP SERPL CALCULATED.3IONS-SCNC: 6 MMOL/L (ref 3–14)
AST SERPL W P-5'-P-CCNC: 16 U/L (ref 0–45)
BILIRUB DIRECT SERPL-MCNC: 0.1 MG/DL (ref 0–0.2)
BILIRUB SERPL-MCNC: 0.6 MG/DL (ref 0.2–1.3)
BUN SERPL-MCNC: 45 MG/DL (ref 7–30)
CALCIUM SERPL-MCNC: 9.3 MG/DL (ref 8.5–10.1)
CHLORIDE SERPL-SCNC: 115 MMOL/L (ref 94–109)
CO2 SERPL-SCNC: 22 MMOL/L (ref 20–32)
CREAT SERPL-MCNC: 1.78 MG/DL (ref 0.66–1.25)
ERYTHROCYTE [DISTWIDTH] IN BLOOD BY AUTOMATED COUNT: 19.5 % (ref 10–15)
GFR SERPL CREATININE-BSD FRML MDRD: 42 ML/MIN/{1.73_M2}
GLUCOSE SERPL-MCNC: 85 MG/DL (ref 70–99)
HCT VFR BLD AUTO: 25.6 % (ref 40–53)
HEMOCCULT STL QL IA: NEGATIVE
HGB BLD-MCNC: 8.7 G/DL (ref 13.3–17.7)
MAGNESIUM SERPL-MCNC: 2 MG/DL (ref 1.6–2.3)
MCH RBC QN AUTO: 32.2 PG (ref 26.5–33)
MCHC RBC AUTO-ENTMCNC: 34 G/DL (ref 31.5–36.5)
MCV RBC AUTO: 95 FL (ref 78–100)
PHOSPHATE SERPL-MCNC: 2.8 MG/DL (ref 2.5–4.5)
PLATELET # BLD AUTO: 123 10E9/L (ref 150–450)
POTASSIUM SERPL-SCNC: 4.8 MMOL/L (ref 3.4–5.3)
PROT SERPL-MCNC: 7.6 G/DL (ref 6.8–8.8)
RBC # BLD AUTO: 2.7 10E12/L (ref 4.4–5.9)
SODIUM SERPL-SCNC: 143 MMOL/L (ref 133–144)
TACROLIMUS BLD-MCNC: 7.2 UG/L (ref 5–15)
TME LAST DOSE: NORMAL H
WBC # BLD AUTO: 1.8 10E9/L (ref 4–11)

## 2020-02-06 PROCEDURE — 80048 BASIC METABOLIC PNL TOTAL CA: CPT | Performed by: SURGERY

## 2020-02-06 PROCEDURE — 80197 ASSAY OF TACROLIMUS: CPT | Performed by: SURGERY

## 2020-02-06 PROCEDURE — 82274 ASSAY TEST FOR BLOOD FECAL: CPT | Performed by: NURSE PRACTITIONER

## 2020-02-06 PROCEDURE — 83735 ASSAY OF MAGNESIUM: CPT | Performed by: SURGERY

## 2020-02-06 PROCEDURE — 80076 HEPATIC FUNCTION PANEL: CPT | Performed by: SURGERY

## 2020-02-06 PROCEDURE — 85027 COMPLETE CBC AUTOMATED: CPT | Performed by: SURGERY

## 2020-02-06 PROCEDURE — 84100 ASSAY OF PHOSPHORUS: CPT | Performed by: SURGERY

## 2020-02-06 ASSESSMENT — PAIN SCALES - GENERAL: PAINLEVEL: NO PAIN (0)

## 2020-02-06 ASSESSMENT — MIFFLIN-ST. JEOR: SCORE: 1539.33

## 2020-02-06 NOTE — PROGRESS NOTES
[facility]  Endocrinology  Tish Toscano PA-C  02/06/2020      Chief Complaint:   Diabetes    History of Present Illness:   Frandy Workman is a 55 year old male with a history of liver cirrhosis secondary to ESTRADA, HCC dx in 2018 s/p liver transplant in 11/2019 which was complicated by hematoma evacuation on POD 1, stage III CKD, CAD, HTN, HLD and diabetes mellitus type 2 with retinopathy who presents for follow-up.     He has been diagnosed with type 2 diabetes since age 29 and has required insulin since 2001. Prior to his transplant in 11/2019 he was on Tresiba 55 units, Metformin ER 500mg w/ supper, Farxiga 10mg daily and  Novolog 1 unit for every 4g of carbs. Of note, he was admitted into the ED from 12/02-12/18/2019 with nausea, vomiting, weakness, confusion and poor appetite with labs showing JERONIMO. Upon admission his A1c was 5.1%. He underwent endoscopy and colonoscopy and was found to have mild gastropathy; duodenal, stomach, and colon bx + mycophenolate toxicity. Mycophenolate stopped as of evening 12/10 and started on Imuran 150mg daily per Transplant team.Last tacro level subtherapeutic on 12/16 so dose increased. On discharge, his Metformin and Farxiga was discontinued and his Tresiba was switched to Lantus at 14 units daily, carb coverage 1u: 10g CHO and MSSI. He was also discharged on Prednisone 5 mg daily on and was on cycled tube feeds with NPH insulin while at [facility].     He had a virtual visit with Lanie Atkinson on 12/19/19 where his Lantus was increased to 20 units in the morning and his carb coverage was increased to 1 unit per 8 g CHO with meals, beginning with lunch. On 12/31/2019 he visited Donald Bradley RPH on 12/31/19 with BG still 150-200 in the AM, so increased Lantus to 28 units daily.     When I last saw him on 01/09/2020 he seemed to being doing quite well in regards to his diabetes control however, he did have some confusion surrounding his carb coverage and sliding scale and he was  encouraged to visit with a diabetes educator.       Interval history:   Reports his hemoglobin has been variable and he has been doing infusions. Reports his diarrhea has resolved and he has not vomited in the past 4 days. He continues to loose weight. He is still on prednisone 5 mg daily and plans to be at this dose for some time. Feels like his BG is fairly well managed but can become high if he missed a dose of his Novolog. This morning he did have a fasting reading of 80, had breakfast and carb covered. He took his BG later and his number was in the 70's. He notes  his Accu Chek Guide meter is still not set up correctly - he checks dosing against his own math and has to reduce 1-2 units most of the time.    He does have lows occasionally overnight. Latest he eats in 7pm (normally 5:30-6pm) and snack around 8:30.     Current diabetes regimen:   -Novolog  1 unit : 8 grams carb  -Novolog correction scale (check BG 4 times daily). Prior to meal: 1 unit per 50 mg/dL >140. Only taking Novolog at bedtime if >190 mg/dL.    -Basaglar 28 units daily     Blood Glucose Monitoring:  We reviewed glucometer data together.  Last 30 days  B - 268  Avg 137  Low BG prior to lunch (Boost or yogurt)     Meter set up:    Was:   Carb 1:8  Target  overnight. 90 -120 day  ISF: 1:30    Targets  7am-8:00 pm:    8:00pm - 7 am: 110-140   Carb 1:8  ISF: 1:50    Diabetes monitoring and complications:  CAD: Yes  Last eye exam results: 2020; stable with no evidence of retinopathy.   Microalbuminuria: 20 in 2019   Neuropathy: Yes; alpha-lipoic acid 600 mg daily   HTN: Yes; carvedilol 12.5 mg BID   On Statin: Yes; rosuvastatin 5 mg daily   On Aspirin: Yes   Depression: Yes; Zoloft 50 mg daily   Erectile dysfunction: Yes    Patient Supplied Answers To Diabetic Questionnaire  including 2020   1. Since your last visit to our clinic (or if this your first visit, since you last saw your primary care provider), have you  experienced any of the following symptoms that may be related to low blood sugars? Shaking or trembling   2. Since your last visit to our clinic (or if this your first visit, since you last saw your primary care provider), have you experienced any of the following symptoms that may be related to prolonged high blood sugars? Blurry vision, Fatigue   4. Do you have any female family members who have had heart attacks or strokes before age 60 or male relatives who have had heart attacks or strokes before age 50? Yes   5. Do you have any family members who have had complications from diabetes such as kidney disease, heart disease or strokes, retinopathy (a vision problem), or amputations? No   6. Who do you live with?  Self   Little interest or pleasure in doing things? Several days   Feeling down, depressed, or hopeless? Several days        Review of Systems:   Pertinent items are noted in HPI.  All other systems are negative.    Active Medications:      Acetaminophen (TYLENOL) 325 MG CAPS, Take 325-650 mg by mouth every 4 hours as needed (pain, fever), Disp: 100 capsule, Rfl: 3     alpha-lipoic acid 600 MG capsule, Take 1 capsule (600 mg) by mouth daily, Disp: 90 capsule, Rfl: 3     Artificial Tear Solution (SM ARTIFICIAL TEARS) SOLN, Place 1 drop into the right eye every hour as needed (dry eyes) Apply at least 4 times daily and as needed for dry eye, Disp: 1 Bottle, Rfl: 3     aspirin (ASA) 325 MG EC tablet, Take 1 tablet (325 mg) by mouth daily, Disp: 30 tablet, Rfl: 3     azaTHIOprine (IMURAN) 50 MG tablet, Take 2 tablets (100 mg) by mouth daily, Disp: 60 tablet, Rfl: 0     BD VIKTORIA U/F 32G X 4 MM insulin pen needle, Use 5 per day, Disp: 300 each, Rfl: 3     blood glucose (ACCU-CHEK EDINSON PLUS) test strip, USE TO TEST FOUR TIMES DAILY OR AS DIRECTED, Disp: 400 strip, Rfl: 6     blood glucose monitoring (ACCU-CHEK EDINSON PLUS) test strip, Use to test blood sugar 4 times daily, Disp: 400 each, Rfl: 3     blood glucose  monitoring (ACCU-CHEK FASTCLIX) lancets, Use to test blood sugar 4 times daily or as directed.  1 box = 102 lancets, Disp: 408 each, Rfl: 3     carvedilol (COREG) 12.5 MG tablet, Take 1 tablet (12.5 mg) by mouth 2 times daily (with meals), Disp: 60 tablet, Rfl: 0     econazole nitrate 1 % external cream, Apply topically daily To feet and toenails., Disp: 85 g, Rfl: 0     ferrous sulfate (FEROSUL) 325 (65 Fe) MG tablet, Take 1 tablet (325 mg) by mouth 3 times daily (with meals), Disp: 90 tablet, Rfl: 3     Glucagon 3 MG/DOSE POWD, Spray 1 Dose in nostril as needed (for low blood sugar with sedation or emesis (unable to ingest glucose)), Disp: 1 each, Rfl: 1     glucose (BD GLUCOSE) 4 g chewable tablet, Take 1 tablet by mouth every hour as needed for low blood sugar, Disp: 60 tablet, Rfl: 1     insulin aspart (NOVOLOG PEN) 100 UNIT/ML pen, Inject 1 unit : 8 grams carb + sliding scale 4 times daily Max units per day   80 units daily ., Disp: 75 mL, Rfl: 3     insulin aspart (NOVOLOG PEN) 100 UNIT/ML pen, Give with meals and snacks Do Not give Correction Insulin if Pre-Meal BG less than 140.  For Pre-Meal  - 189 give 1 unit.  For Pre-Meal  - 239 give 2 units.  For Pre-Meal  - 289 give 3 units.  For Pre-Meal  - 339 give 4 units.  For Pre-Meal - 399 give 5 units.  For Pre-Meal -449 give 6 units For Pre-Meal BG greater than or equal to 450 give 7 units.  To be given with prandial insulin, and based on pre-meal blood glucose.   If given at mealtime, administer within 30 minutes of start of meal, Disp: 3 mL, Rfl: 1     insulin glargine (BASAGLAR KWIKPEN) 100 UNIT/ML pen, Inject 28 Units Subcutaneous daily, Disp: 30 mL, Rfl: 3     lamiVUDine (EPIVIR) 100 MG tablet, Take 1 tablet (100 mg) by mouth daily, Disp: 30 tablet, Rfl: 3     loperamide (IMODIUM) 2 MG capsule, Take 1 capsule (2 mg) by mouth 4 times daily as needed for diarrhea, Disp: 90 capsule, Rfl: 1     magnesium oxide (MAG-OX) 400  MG tablet, Take 1 tablet (400 mg) by mouth daily, Disp: 60 tablet, Rfl: 0     melatonin 5 MG tablet, Take 5 mg by mouth nightly as needed for sleep Take at 6 pm every night, Disp: , Rfl:      metoclopramide (REGLAN) 5 MG tablet, Take 1 tablet (5 mg) by mouth 2 times daily (before meals) for 14 days, Disp: 28 tablet, Rfl: 0     Multiple Vitamin (THERAVITE PO), Take 1 tablet by mouth every morning , Disp: , Rfl:      omeprazole (PRILOSEC) 40 MG DR capsule, Take 1 capsule (40 mg) by mouth daily, Disp: 90 capsule, Rfl: 2     ondansetron (ZOFRAN) 4 MG tablet, Take 1 tablet (4 mg) by mouth every 6 hours as needed for nausea or vomiting, Disp: 30 tablet, Rfl: 3     predniSONE (DELTASONE) 5 MG tablet, Take 1 tablet (5 mg) by mouth daily, Disp: 30 tablet, Rfl: 3     prochlorperazine (COMPAZINE) 5 MG tablet, Take 1 tablet (5 mg) by mouth every 6 hours as needed for nausea or vomiting (use after Zofran), Disp: , Rfl:      rosuvastatin (CRESTOR) 5 MG tablet, Take 1 tablet (5 mg) by mouth daily, Disp: 30 tablet, Rfl: 3     sertraline (ZOLOFT) 50 MG tablet, Take 1 tablet (50 mg) by mouth daily, Disp: 30 tablet, Rfl: 3     sodium bicarbonate 650 MG tablet, Take 1 tablet (650 mg) by mouth 2 times daily, Disp: 60 tablet, Rfl: 0     sodium chloride (OCEAN) 0.65 % nasal spray, Spray 1 spray into both nostrils every hour as needed for congestion, Disp: , Rfl:      sulfamethoxazole-trimethoprim (BACTRIM/SEPTRA) 400-80 MG tablet, Take 1 tablet by mouth twice a week Monday and Thursday only, Disp: 30 tablet, Rfl: 11     tacrolimus (GENERIC EQUIVALENT) 1 MG capsule, Take 5 capsules (5 mg) by mouth 2 times daily, Disp: 300 capsule, Rfl: 3     tamsulosin (FLOMAX) 0.4 MG capsule, TAKE 1 CAPSULE(0.4 MG) BY MOUTH DAILY, Disp: 90 capsule, Rfl: 3     valGANciclovir (VALCYTE) 450 MG tablet, Take 1 tablet (450 mg) by mouth every other day, Disp: 15 tablet, Rfl: 1     vitamin B-12 (CYANOCOBALAMIN) 1000 MCG tablet, Take 1 tablet (1,000 mcg) by mouth  "daily, Disp: 30 tablet, Rfl: 0     vitamin D3 (CHOLECALCIFEROL) 2000 units (50 mcg) tablet, Take 1 tablet (2,000 Units) by mouth daily, Disp: 30 tablet, Rfl: 0    Current Facility-Administered Medications:      Aflibercept (EYLEA) injection 2 mg, 2 mg, Intravitreal, Q28 Days, Enriqueta Martin MD     Aflibercept (EYLEA) injection 2 mg, 2 mg, Intravitreal, Q28 Days, Enriqueta Martin MD, 2 mg at 06/27/19 1303     ranibizumab (LUCENTIS) injection syringe 0.5 mg, 0.5 mg, Intravitreal, Q28 Days, Enriqueta Martin MD      Allergies:   Codeine and Seasonal allergies      Past Medical History:  Anemia   BPH   CAD   HTN   HLD   Depression   Conductive hearing loss   GERD   Hepatocellular carcinoma   Liver cirrhosis secondary to ESTRADA   Immunosuppressed    Malnutrition   Chronic CKD stage III   Portal vein thrombosis   Diabetes mellitus type 2   Retinopathy   Bipolar affective disorder   Esophageal varices   Erectile dysfunction   Anxiety     Past Surgical History:  Heart catheterization-02/2019   Gold weight eyelid implant; right-11/2017   Chemo embolization-12/2019   Left knee surgery   Right parotidectomy with radical neck dissection-11/2017   Liver transplant-11/11/2019   Exploratory laparotomy, hematoma evacuation and abdominal washout-11/12/2019     Family History:   Father: colon cancer, pancreatic cancer, prostate cancer, colorectal cancer, macular degeneration, glaucoma, skin cancer   Mother: cancer, diabetes, cerebrovascular disease, thyroid disease, depression   Sister: asthma, thyroid disease, depression   Maternal grandfather: prostate cancer, substance abuse   Maternal grandmother: colorectal cancer, substance abuse   Paternal grandmother: colorectal cancer     Social History:   The patient was alone   Smoking Status: never   Smokeless Tobacco: former; chew (1 tin per week)    Alcohol Use: former; quit in 1996       Physical Exam:   /71   Pulse 76   Ht 1.753 m (5' 9\")   Wt 71.9 kg " "(158 lb 8 oz)   BMI 23.41 kg/m       Wt Readings from Last 10 Encounters:   02/06/20 71.9 kg (158 lb 8 oz)   01/27/20 73.1 kg (161 lb 3.2 oz)   01/21/20 72.9 kg (160 lb 11.2 oz)   01/13/20 74.3 kg (163 lb 14.4 oz)   01/09/20 73.9 kg (162 lb 14.4 oz)   12/30/19 75.9 kg (167 lb 6.4 oz)   12/18/19 73.6 kg (162 lb 4.8 oz)   12/10/19 76.7 kg (169 lb 1.6 oz)   12/02/19 74.8 kg (165 lb)   12/02/19 74.9 kg (165 lb 3.2 oz)        General: Pleasant, well nourished and hydrated male in NAD.   Psych:  Mood is \"good,\" affect is appropriate.  Thought form and content are fluid and coherent.    HEENT: Eyes and sclera are clear. Extraocular movements are grossly intact without proptosis.  Nares are patent, mucous membranes moist.  Neck: No masses or JVD are noted.    Resp: Easy and unlabored breathing.   Neuro: Alert and oriented, communicating clearly.   Ext: no swelling or edema     Data:  Lab Results   Component Value Date     02/06/2020    POTASSIUM 4.8 02/06/2020    CHLORIDE 115 (H) 02/06/2020    CO2 22 02/06/2020    ANIONGAP 6 02/06/2020    GLC 85 02/06/2020    BUN 45 (H) 02/06/2020    CR 1.78 (H) 02/06/2020    DEBORAH 9.3 02/06/2020     Lab Results   Component Value Date    GFRESTIMATED 42 (L) 02/06/2020    GFRESTIMATED 42 (L) 02/04/2020    GFRESTIMATED 40 (L) 01/30/2020    GFRESTBLACK 48 (L) 02/06/2020    GFRESTBLACK 49 (L) 02/04/2020    GFRESTBLACK 46 (L) 01/30/2020      Lab Results   Component Value Date    MICROL 60 12/02/2019    UMALCR 102.40 (H) 12/02/2019        Lab Results   Component Value Date    A1C 6.6 (H) 08/08/2018    A1C 6.5 (H) 06/09/2017    A1C 7.8 (H) 10/25/2016    HEMOGLOBINA1 5.1 12/02/2019    HEMOGLOBINA1 5.4 08/14/2019    HEMOGLOBINA1 6.1 (A) 02/11/2019    HEMOGLOBINA1 8.1 (A) 04/11/2018    HEMOGLOBINA1 7.6 (A) 10/16/2017     Lab Results   Component Value Date    CHOL 131 02/04/2019    CHOL 133 08/08/2018    TRIG 117 02/04/2019    TRIG 172 (H) 08/08/2018    HDL 26 (L) 02/04/2019    HDL 30 (L) " 08/08/2018    LDL 81 02/04/2019    LDL 68 08/08/2018    NHDL 105 02/04/2019    NHDL 103 08/08/2018       Assessment and Plan:  Type 2 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 He is doing quite well with current bolus and basal regimen with limited hypoglycemia. I did update settings on expert meter so he can use it more reliably. Hopefully this can reduce number of low blood sugars as set at 1 unit per 30 mg/dL with generally subtracting 1-2 units of correction dosing rather then 1 unit per 50 mg/dL. In order to avoid overnight lows we did also change his sliding scale to 1 unit per 50 mg/dL with values >130 during the day and >140 from 8pm-7am. Unless he is having more low blood sugars we will plan to see in 3 months.     He recently had a gastric empty study which revealed delayed emptying at one hour. Otherwise results were normal. Unlikely this is contributing to his lows so will not change diabetes regimen at this time. I would be reluctant to add GLP2 agonist or possibly metformin therapies because of this and ongoing nausea with some emesis.    I do think that restarting Empagliflozin may be a good idea to limit post prandial hyperglycemia and need for Novolog risk, however with ongoing emesis I am concerned about dehydration. Reconsider at RTC.     Follow-up: Return in about 3 months (around 5/6/2020).     >50% of 30 minute visit spent in face to face counseling, education and coordination of care related to options for better glycemic control as well as preventing, detecting, and treating hypoglycemia.      It is my privilege to be involved in the care of the above patient.     Tish Toscano PA-C, MPAS  [facility]  Diabetes, Endocrinology, and  [facility]  [phone] Appointments/Nurse  [phone] Fax  [phone] pager  [phone] nurse line               Scribe Disclosure:  I, Ana Henning, am serving as a scribe to document services personally performed by Tish Toscano PA-C at this visit, based upon the provider's statements to me. All documentation has been reviewed by the aforementioned provider prior to being entered into the official medical record.     Portions of this medical record were completed by a scribe. UPON MY REVIEW AND AUTHENTICATION BY ELECTRONIC SIGNATURE, this confirms (a) I performed the applicable clinical services, and (b) the record is accurate.

## 2020-02-06 NOTE — TELEPHONE ENCOUNTER
Patient Name: Frandy Workman   : 1965  MRN: [id number]       :  N/A    [employer] Active    VM with information needed to complete pre-assessment call.  Request pt contact Endoscopy Pre-assessment RN to complete upcoming procedure information.  Telephone call-back number provided.    [x] Resent via [employer] - This includes: EGD  instructions, Conscious Sedation policy, procedure date/time/location/provider.    Raquel Cadet RN  [facility]    Additional Information regarding appointment:  _____________________________________________________      Patient scheduled for:  EGD    Indication for procedure. Anemia, unspecified type     Sedation Type: C/S    Procedure Provider:  Banuelos      Referring Provider. Carla Pineda; Maximo Tucker    Arrival time verified: 2020    Facility location verified:   [place] [address]  [address]st Floor, [address]    [x] N/A for this Payor ([place])    Pt meets medical necessity for outpatient procedure in hospital Endoscopy Unit: N/A      History & Physical: [x] N/A    Additional Information: Davi Saunders Jr (friend) CBO authorization confirmed prior to call.   __________________________________________________      Prep Type:   [x] NPO /p 0300, No solid food /p 2200 the night before      Patient taking any blood thinners ? Yes  Anticoagulants or blood thinners:           [x]  mg  - may continue   ................................................            Electronic implanted medical devices ? No      GI / Hepatology History: Yes: Cirrhosis, Multi, See EPIC      Heart disease ? Yes: CAD, HTN - See EPIC      Lung disease ? No      Sleep apnea ? No      Diabetic ? Yes, DM-2  [x] Advised to contact Provider re: DM management pre/mikaela prep      Kidney disease ? Yes, CKD, Stage III  Hemodialysis: No      Instructions given: [] Rec'd & Read   [] Reviewed                 Pre procedure teaching completed: [] Yes - Reviewed      IV Sedation: [] No questions regarding Sedation as ordered       : Transportation from procedure & responsible adult to be with patient following procedure for a minimum of 6 hrs (Conscious Sedation): [] ** - confirmed will have post-procedure companionship as required      Pt completed assessment via:  [] Return [employer] query responses   [] Return/Follow-up telephone call    _______________________________________________    Raquel Cadet RN  [facility]

## 2020-02-06 NOTE — LETTER
2/6/2020       RE: Frandy Workman  [address]  [facility] [address]     Dear Colleague,    Thank you for referring your patient, Frandy Workman, to the [facility] ENDOCRINOLOGY at [facility]. Please see a copy of my visit note below.    [facility]  Endocrinology  Tish Toscano PA-C  02/06/2020      Chief Complaint:   Diabetes    History of Present Illness:   Frandy Workman is a 55 year old male with a history of liver cirrhosis secondary to ESTRADA, HCC dx in 2018 s/p liver transplant in 11/2019 which was complicated by hematoma evacuation on POD 1, stage III CKD, CAD, HTN, HLD and diabetes mellitus type 2 with retinopathy who presents for follow-up.     He has been diagnosed with type 2 diabetes since age 29 and has required insulin since 2001. Prior to his transplant in 11/2019 he was on Tresiba 55 units, Metformin ER 500mg w/ supper, Farxiga 10mg daily and  Novolog 1 unit for every 4g of carbs. Of note, he was admitted into the ED from 12/02-12/18/2019 with nausea, vomiting, weakness, confusion and poor appetite with labs showing JERONIMO. Upon admission his A1c was 5.1%. He underwent endoscopy and colonoscopy and was found to have mild gastropathy; duodenal, stomach, and colon bx + mycophenolate toxicity. Mycophenolate stopped as of evening 12/10 and started on Imuran 150mg daily per Transplant team.Last tacro level subtherapeutic on 12/16 so dose increased. On discharge, his Metformin and Farxiga was discontinued and his Tresiba was switched to Lantus at 14 units daily, carb coverage 1u: 10g CHO and MSSI. He was also discharged on Prednisone 5 mg daily on and was on cycled tube feeds with NPH insulin while at [facility].     He had a virtual visit with Lanie Atkinson on 12/19/19 where his Lantus was increased to 20 units in the morning and his carb coverage was increased to 1 unit per 8 g CHO with meals, beginning with lunch. On 12/31/2019 he visited Donald Bradley RPH on  19 with BG still 150-200 in the AM, so increased Lantus to 28 units daily.     When I last saw him on 2020 he seemed to being doing quite well in regards to his diabetes control however, he did have some confusion surrounding his carb coverage and sliding scale and he was encouraged to visit with a diabetes educator.       Interval history:   Reports his hemoglobin has been variable and he has been doing infusions. Reports his diarrhea has resolved and he has not vomited in the past 4 days. He continues to loose weight. He is still on prednisone 5 mg daily and plans to be at this dose for some time. Feels like his BG is fairly well managed but can become high if he missed a dose of his Novolog. This morning he did have a fasting reading of 80, had breakfast and carb covered. He took his BG later and his number was in the 70's. He notes  his Accu Chek Guide meter is still not set up correctly - he checks dosing against his own math and has to reduce 1-2 units most of the time.    He does have lows occasionally overnight. Latest he eats in 7pm (normally 5:30-6pm) and snack around 8:30.     Current diabetes regimen:   -Novolog  1 unit : 8 grams carb  -Novolog correction scale (check BG 4 times daily). Prior to meal: 1 unit per 50 mg/dL >140. Only taking Novolog at bedtime if >190 mg/dL.    -Basaglar 28 units daily     Blood Glucose Monitoring:  We reviewed glucometer data together.  Last 30 days  B - 268  Avg 137  Low BG prior to lunch (Boost or yogurt)     Meter set up:    Was:   Carb 1:8  Target  overnight. 90 -120 day  ISF: 1:30    Targets  7am-8:00 pm:    8:00pm - 7 am: 110-140   Carb 1:8  ISF: 1:50    Diabetes monitoring and complications:  CAD: Yes  Last eye exam results: 2020; stable with no evidence of retinopathy.   Microalbuminuria: 20 in 2019   Neuropathy: Yes; alpha-lipoic acid 600 mg daily   HTN: Yes; carvedilol 12.5 mg BID   On Statin: Yes; rosuvastatin 5 mg daily    On Aspirin: Yes   Depression: Yes; Zoloft 50 mg daily   Erectile dysfunction: Yes    Patient Supplied Answers To Diabetic Questionnaire  including 1/31/2020   1. Since your last visit to our clinic (or if this your first visit, since you last saw your primary care provider), have you experienced any of the following symptoms that may be related to low blood sugars? Shaking or trembling   2. Since your last visit to our clinic (or if this your first visit, since you last saw your primary care provider), have you experienced any of the following symptoms that may be related to prolonged high blood sugars? Blurry vision, Fatigue   4. Do you have any female family members who have had heart attacks or strokes before age 60 or male relatives who have had heart attacks or strokes before age 50? Yes   5. Do you have any family members who have had complications from diabetes such as kidney disease, heart disease or strokes, retinopathy (a vision problem), or amputations? No   6. Who do you live with?  Self   Little interest or pleasure in doing things? Several days   Feeling down, depressed, or hopeless? Several days        Review of Systems:   Pertinent items are noted in HPI.  All other systems are negative.    Active Medications:      Acetaminophen (TYLENOL) 325 MG CAPS, Take 325-650 mg by mouth every 4 hours as needed (pain, fever), Disp: 100 capsule, Rfl: 3     alpha-lipoic acid 600 MG capsule, Take 1 capsule (600 mg) by mouth daily, Disp: 90 capsule, Rfl: 3     Artificial Tear Solution (SM ARTIFICIAL TEARS) SOLN, Place 1 drop into the right eye every hour as needed (dry eyes) Apply at least 4 times daily and as needed for dry eye, Disp: 1 Bottle, Rfl: 3     aspirin (ASA) 325 MG EC tablet, Take 1 tablet (325 mg) by mouth daily, Disp: 30 tablet, Rfl: 3     azaTHIOprine (IMURAN) 50 MG tablet, Take 2 tablets (100 mg) by mouth daily, Disp: 60 tablet, Rfl: 0     BD VIKTORIA U/F 32G X 4 MM insulin pen needle, Use 5 per day,  Disp: 300 each, Rfl: 3     blood glucose (ACCU-CHEK EDINSON PLUS) test strip, USE TO TEST FOUR TIMES DAILY OR AS DIRECTED, Disp: 400 strip, Rfl: 6     blood glucose monitoring (ACCU-CHEK EDINSON PLUS) test strip, Use to test blood sugar 4 times daily, Disp: 400 each, Rfl: 3     blood glucose monitoring (ACCU-CHEK FASTCLIX) lancets, Use to test blood sugar 4 times daily or as directed.  1 box = 102 lancets, Disp: 408 each, Rfl: 3     carvedilol (COREG) 12.5 MG tablet, Take 1 tablet (12.5 mg) by mouth 2 times daily (with meals), Disp: 60 tablet, Rfl: 0     econazole nitrate 1 % external cream, Apply topically daily To feet and toenails., Disp: 85 g, Rfl: 0     ferrous sulfate (FEROSUL) 325 (65 Fe) MG tablet, Take 1 tablet (325 mg) by mouth 3 times daily (with meals), Disp: 90 tablet, Rfl: 3     Glucagon 3 MG/DOSE POWD, Spray 1 Dose in nostril as needed (for low blood sugar with sedation or emesis (unable to ingest glucose)), Disp: 1 each, Rfl: 1     glucose (BD GLUCOSE) 4 g chewable tablet, Take 1 tablet by mouth every hour as needed for low blood sugar, Disp: 60 tablet, Rfl: 1     insulin aspart (NOVOLOG PEN) 100 UNIT/ML pen, Inject 1 unit : 8 grams carb + sliding scale 4 times daily Max units per day   80 units daily ., Disp: 75 mL, Rfl: 3     insulin aspart (NOVOLOG PEN) 100 UNIT/ML pen, Give with meals and snacks Do Not give Correction Insulin if Pre-Meal BG less than 140.  For Pre-Meal  - 189 give 1 unit.  For Pre-Meal  - 239 give 2 units.  For Pre-Meal  - 289 give 3 units.  For Pre-Meal  - 339 give 4 units.  For Pre-Meal - 399 give 5 units.  For Pre-Meal -449 give 6 units For Pre-Meal BG greater than or equal to 450 give 7 units.  To be given with prandial insulin, and based on pre-meal blood glucose.   If given at mealtime, administer within 30 minutes of start of meal, Disp: 3 mL, Rfl: 1     insulin glargine (BASAGLAR KWIKPEN) 100 UNIT/ML pen, Inject 28 Units Subcutaneous daily,  Disp: 30 mL, Rfl: 3     lamiVUDine (EPIVIR) 100 MG tablet, Take 1 tablet (100 mg) by mouth daily, Disp: 30 tablet, Rfl: 3     loperamide (IMODIUM) 2 MG capsule, Take 1 capsule (2 mg) by mouth 4 times daily as needed for diarrhea, Disp: 90 capsule, Rfl: 1     magnesium oxide (MAG-OX) 400 MG tablet, Take 1 tablet (400 mg) by mouth daily, Disp: 60 tablet, Rfl: 0     melatonin 5 MG tablet, Take 5 mg by mouth nightly as needed for sleep Take at 6 pm every night, Disp: , Rfl:      metoclopramide (REGLAN) 5 MG tablet, Take 1 tablet (5 mg) by mouth 2 times daily (before meals) for 14 days, Disp: 28 tablet, Rfl: 0     Multiple Vitamin (THERAVITE PO), Take 1 tablet by mouth every morning , Disp: , Rfl:      omeprazole (PRILOSEC) 40 MG DR capsule, Take 1 capsule (40 mg) by mouth daily, Disp: 90 capsule, Rfl: 2     ondansetron (ZOFRAN) 4 MG tablet, Take 1 tablet (4 mg) by mouth every 6 hours as needed for nausea or vomiting, Disp: 30 tablet, Rfl: 3     predniSONE (DELTASONE) 5 MG tablet, Take 1 tablet (5 mg) by mouth daily, Disp: 30 tablet, Rfl: 3     prochlorperazine (COMPAZINE) 5 MG tablet, Take 1 tablet (5 mg) by mouth every 6 hours as needed for nausea or vomiting (use after Zofran), Disp: , Rfl:      rosuvastatin (CRESTOR) 5 MG tablet, Take 1 tablet (5 mg) by mouth daily, Disp: 30 tablet, Rfl: 3     sertraline (ZOLOFT) 50 MG tablet, Take 1 tablet (50 mg) by mouth daily, Disp: 30 tablet, Rfl: 3     sodium bicarbonate 650 MG tablet, Take 1 tablet (650 mg) by mouth 2 times daily, Disp: 60 tablet, Rfl: 0     sodium chloride (OCEAN) 0.65 % nasal spray, Spray 1 spray into both nostrils every hour as needed for congestion, Disp: , Rfl:      sulfamethoxazole-trimethoprim (BACTRIM/SEPTRA) 400-80 MG tablet, Take 1 tablet by mouth twice a week Monday and Thursday only, Disp: 30 tablet, Rfl: 11     tacrolimus (GENERIC EQUIVALENT) 1 MG capsule, Take 5 capsules (5 mg) by mouth 2 times daily, Disp: 300 capsule, Rfl: 3     tamsulosin  (FLOMAX) 0.4 MG capsule, TAKE 1 CAPSULE(0.4 MG) BY MOUTH DAILY, Disp: 90 capsule, Rfl: 3     valGANciclovir (VALCYTE) 450 MG tablet, Take 1 tablet (450 mg) by mouth every other day, Disp: 15 tablet, Rfl: 1     vitamin B-12 (CYANOCOBALAMIN) 1000 MCG tablet, Take 1 tablet (1,000 mcg) by mouth daily, Disp: 30 tablet, Rfl: 0     vitamin D3 (CHOLECALCIFEROL) 2000 units (50 mcg) tablet, Take 1 tablet (2,000 Units) by mouth daily, Disp: 30 tablet, Rfl: 0    Current Facility-Administered Medications:      Aflibercept (EYLEA) injection 2 mg, 2 mg, Intravitreal, Q28 Days, Enriqueta Martin MD     Aflibercept (EYLEA) injection 2 mg, 2 mg, Intravitreal, Q28 Days, Enriqueta Martin MD, 2 mg at 06/27/19 1303     ranibizumab (LUCENTIS) injection syringe 0.5 mg, 0.5 mg, Intravitreal, Q28 Days, Enriqueta Martin MD      Allergies:   Codeine and Seasonal allergies      Past Medical History:  Anemia   BPH   CAD   HTN   HLD   Depression   Conductive hearing loss   GERD   Hepatocellular carcinoma   Liver cirrhosis secondary to ESTRADA   Immunosuppressed    Malnutrition   Chronic CKD stage III   Portal vein thrombosis   Diabetes mellitus type 2   Retinopathy   Bipolar affective disorder   Esophageal varices   Erectile dysfunction   Anxiety     Past Surgical History:  Heart catheterization-02/2019   Gold weight eyelid implant; right-11/2017   Chemo embolization-12/2019   Left knee surgery   Right parotidectomy with radical neck dissection-11/2017   Liver transplant-11/11/2019   Exploratory laparotomy, hematoma evacuation and abdominal washout-11/12/2019     Family History:   Father: colon cancer, pancreatic cancer, prostate cancer, colorectal cancer, macular degeneration, glaucoma, skin cancer   Mother: cancer, diabetes, cerebrovascular disease, thyroid disease, depression   Sister: asthma, thyroid disease, depression   Maternal grandfather: prostate cancer, substance abuse   Maternal grandmother: colorectal cancer,  "substance abuse   Paternal grandmother: colorectal cancer     Social History:   The patient was alone   Smoking Status: never   Smokeless Tobacco: former; chew (1 tin per week)    Alcohol Use: former; quit in 1996       Physical Exam:   /71   Pulse 76   Ht 1.753 m (5' 9\")   Wt 71.9 kg (158 lb 8 oz)   BMI 23.41 kg/m        Wt Readings from Last 10 Encounters:   02/06/20 71.9 kg (158 lb 8 oz)   01/27/20 73.1 kg (161 lb 3.2 oz)   01/21/20 72.9 kg (160 lb 11.2 oz)   01/13/20 74.3 kg (163 lb 14.4 oz)   01/09/20 73.9 kg (162 lb 14.4 oz)   12/30/19 75.9 kg (167 lb 6.4 oz)   12/18/19 73.6 kg (162 lb 4.8 oz)   12/10/19 76.7 kg (169 lb 1.6 oz)   12/02/19 74.8 kg (165 lb)   12/02/19 74.9 kg (165 lb 3.2 oz)        General: Pleasant, well nourished and hydrated male in NAD.   Psych:  Mood is \"good,\" affect is appropriate.  Thought form and content are fluid and coherent.    HEENT: Eyes and sclera are clear. Extraocular movements are grossly intact without proptosis.  Nares are patent, mucous membranes moist.  Neck: No masses or JVD are noted.    Resp: Easy and unlabored breathing.   Neuro: Alert and oriented, communicating clearly.   Ext: no swelling or edema     Data:  Lab Results   Component Value Date     02/06/2020    POTASSIUM 4.8 02/06/2020    CHLORIDE 115 (H) 02/06/2020    CO2 22 02/06/2020    ANIONGAP 6 02/06/2020    GLC 85 02/06/2020    BUN 45 (H) 02/06/2020    CR 1.78 (H) 02/06/2020    DEBORAH 9.3 02/06/2020     Lab Results   Component Value Date    GFRESTIMATED 42 (L) 02/06/2020    GFRESTIMATED 42 (L) 02/04/2020    GFRESTIMATED 40 (L) 01/30/2020    GFRESTBLACK 48 (L) 02/06/2020    GFRESTBLACK 49 (L) 02/04/2020    GFRESTBLACK 46 (L) 01/30/2020      Lab Results   Component Value Date    MICROL 60 12/02/2019    UMALCR 102.40 (H) 12/02/2019        Lab Results   Component Value Date    A1C 6.6 (H) 08/08/2018    A1C 6.5 (H) 06/09/2017    A1C 7.8 (H) 10/25/2016    HEMOGLOBINA1 5.1 12/02/2019    HEMOGLOBINA1 5.4 " 08/14/2019    HEMOGLOBINA1 6.1 (A) 02/11/2019    HEMOGLOBINA1 8.1 (A) 04/11/2018    HEMOGLOBINA1 7.6 (A) 10/16/2017     Lab Results   Component Value Date    CHOL 131 02/04/2019    CHOL 133 08/08/2018    TRIG 117 02/04/2019    TRIG 172 (H) 08/08/2018    HDL 26 (L) 02/04/2019    HDL 30 (L) 08/08/2018    LDL 81 02/04/2019    LDL 68 08/08/2018    NHDL 105 02/04/2019    NHDL 103 08/08/2018       Assessment and Plan:  Type 2 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 He is doing quite well with current bolus and basal regimen with limited hypoglycemia. I did update settings on expert meter so he can use it more reliably. Hopefully this can reduce number of low blood sugars as set at 1 unit per 30 mg/dL with generally subtracting 1-2 units of correction dosing rather then 1 unit per 50 mg/dL. In order to avoid overnight lows we did also change his sliding scale to 1 unit per 50 mg/dL with values >130 during the day and >140 from 8pm-7am. Unless he is having more low blood sugars we will plan to see in 3 months.     He recently had a gastric empty study which revealed delayed emptying at one hour. Otherwise results were normal. Unlikely this is contributing to his lows so will not change diabetes regimen at this time. I would be reluctant to add GLP2 agonist or possibly metformin therapies because of this and ongoing nausea with some emesis.    I do think that restarting Empagliflozin may be a good idea to limit post prandial hyperglycemia and need for Novolog risk, however with ongoing emesis I am concerned about dehydration. Reconsider at RTC.     Follow-up: Return in about 3 months (around 5/6/2020).     >50% of 30 minute visit spent in face to face counseling, education and  coordination of care related to options for better glycemic control as well as preventing, detecting, and treating hypoglycemia.      It is my privilege to be involved in the care of the above patient.     Tish Toscano PA-C, [facility]  [facility]  Diabetes, Endocrinology, and Metabolism  [phone] Appointments/Nurse  [phone] Fax  [phone] pager  [phone] nurse line               Scribe Disclosure:  I, Ana Henning, am serving as a scribe to document services personally performed by Tish Toscano PA-C at this visit, based upon the provider's statements to me. All documentation has been reviewed by the aforementioned provider prior to being entered into the official medical record.     Portions of this medical record were completed by a scribe. UPON MY REVIEW AND AUTHENTICATION BY ELECTRONIC SIGNATURE, this confirms (a) I performed the applicable clinical services, and (b) the record is accurate.        Again, thank you for allowing me to participate in the care of your patient.      Sincerely,    Tish Toscano PA-C

## 2020-02-06 NOTE — PATIENT INSTRUCTIONS
Call if ANY numbers <70  - or often <80 or >200 so we can adjust settings.    Otherwise things look quite good.      Keep up your excellent work!    My best wishes,    Tish Toscano PA-C, MPAS  [facility]  Diabetes, Endocrinology, and Metabolism  [phone] Appointments/Nurse  [phone] Fax  [phone] nurse line - call during day   [phone] URGENTafter hours/weekend Endocrinologist on call

## 2020-02-10 ENCOUNTER — TELEPHONE (OUTPATIENT)
Dept: GASTROENTEROLOGY | Facility: CLINIC | Age: 55
End: 2020-02-10

## 2020-02-10 LAB
ALBUMIN SERPL-MCNC: 4.5 G/DL (ref 3.4–5)
ALP SERPL-CCNC: 106 U/L (ref 40–150)
ALT SERPL W P-5'-P-CCNC: 21 U/L (ref 0–70)
ANION GAP SERPL CALCULATED.3IONS-SCNC: 7 MMOL/L (ref 3–14)
AST SERPL W P-5'-P-CCNC: 19 U/L (ref 0–45)
BILIRUB DIRECT SERPL-MCNC: 0.2 MG/DL (ref 0–0.2)
BILIRUB SERPL-MCNC: 0.7 MG/DL (ref 0.2–1.3)
BUN SERPL-MCNC: 41 MG/DL (ref 7–30)
CALCIUM SERPL-MCNC: 9 MG/DL (ref 8.5–10.1)
CHLORIDE SERPL-SCNC: 113 MMOL/L (ref 94–109)
CO2 SERPL-SCNC: 23 MMOL/L (ref 20–32)
CREAT SERPL-MCNC: 1.65 MG/DL (ref 0.66–1.25)
ERYTHROCYTE [DISTWIDTH] IN BLOOD BY AUTOMATED COUNT: 20.2 % (ref 10–15)
GFR SERPL CREATININE-BSD FRML MDRD: 46 ML/MIN/{1.73_M2}
GLUCOSE SERPL-MCNC: 171 MG/DL (ref 70–99)
HCT VFR BLD AUTO: 25.5 % (ref 40–53)
HGB BLD-MCNC: 8.6 G/DL (ref 13.3–17.7)
MAGNESIUM SERPL-MCNC: 1.9 MG/DL (ref 1.6–2.3)
MCH RBC QN AUTO: 32.1 PG (ref 26.5–33)
MCHC RBC AUTO-ENTMCNC: 33.7 G/DL (ref 31.5–36.5)
MCV RBC AUTO: 95 FL (ref 78–100)
PHOSPHATE SERPL-MCNC: 3.3 MG/DL (ref 2.5–4.5)
PLATELET # BLD AUTO: 118 10E9/L (ref 150–450)
POTASSIUM SERPL-SCNC: 4.6 MMOL/L (ref 3.4–5.3)
PROT SERPL-MCNC: 7.6 G/DL (ref 6.8–8.8)
RBC # BLD AUTO: 2.68 10E12/L (ref 4.4–5.9)
SODIUM SERPL-SCNC: 143 MMOL/L (ref 133–144)
TACROLIMUS BLD-MCNC: 7.2 UG/L (ref 5–15)
TME LAST DOSE: NORMAL H
WBC # BLD AUTO: 2.2 10E9/L (ref 4–11)

## 2020-02-10 PROCEDURE — 83735 ASSAY OF MAGNESIUM: CPT | Performed by: SURGERY

## 2020-02-10 PROCEDURE — 80076 HEPATIC FUNCTION PANEL: CPT | Performed by: SURGERY

## 2020-02-10 PROCEDURE — 84100 ASSAY OF PHOSPHORUS: CPT | Performed by: SURGERY

## 2020-02-10 PROCEDURE — 85027 COMPLETE CBC AUTOMATED: CPT | Performed by: SURGERY

## 2020-02-10 PROCEDURE — 80048 BASIC METABOLIC PNL TOTAL CA: CPT | Performed by: SURGERY

## 2020-02-10 PROCEDURE — 80197 ASSAY OF TACROLIMUS: CPT | Performed by: SURGERY

## 2020-02-11 ENCOUNTER — TELEPHONE (OUTPATIENT)
Dept: TRANSPLANT | Facility: CLINIC | Age: 55
End: 2020-02-11

## 2020-02-11 ENCOUNTER — OFFICE VISIT (OUTPATIENT)
Dept: GASTROENTEROLOGY | Facility: CLINIC | Age: 55
End: 2020-02-11
Attending: INTERNAL MEDICINE
Payer: MEDICARE

## 2020-02-11 ENCOUNTER — OFFICE VISIT (OUTPATIENT)
Dept: PHARMACY | Facility: CLINIC | Age: 55
End: 2020-02-11
Payer: COMMERCIAL

## 2020-02-11 VITALS
RESPIRATION RATE: 18 BRPM | TEMPERATURE: 97.9 F | BODY MASS INDEX: 23.92 KG/M2 | WEIGHT: 161.5 LBS | OXYGEN SATURATION: 100 % | HEART RATE: 71 BPM | DIASTOLIC BLOOD PRESSURE: 62 MMHG | HEIGHT: 69 IN | SYSTOLIC BLOOD PRESSURE: 106 MMHG

## 2020-02-11 DIAGNOSIS — K75.81 LIVER CIRRHOSIS SECONDARY TO NASH (H): ICD-10-CM

## 2020-02-11 DIAGNOSIS — Z94.4 STATUS POST LIVER TRANSPLANTATION (H): Primary | ICD-10-CM

## 2020-02-11 DIAGNOSIS — E11.9 TYPE 2 DIABETES MELLITUS WITHOUT COMPLICATION, WITH LONG-TERM CURRENT USE OF INSULIN (H): Primary | ICD-10-CM

## 2020-02-11 DIAGNOSIS — Z94.4 LIVER TRANSPLANT RECIPIENT (H): ICD-10-CM

## 2020-02-11 DIAGNOSIS — Z79.4 TYPE 2 DIABETES MELLITUS WITHOUT COMPLICATION, WITH LONG-TERM CURRENT USE OF INSULIN (H): Primary | ICD-10-CM

## 2020-02-11 DIAGNOSIS — K74.60 LIVER CIRRHOSIS SECONDARY TO NASH (H): ICD-10-CM

## 2020-02-11 PROBLEM — R93.1 ABNORMAL FINDINGS DIAGNOSTIC IMAGING OF HEART AND CORONARY CIRCULATION: Status: RESOLVED | Noted: 2019-02-12 | Resolved: 2020-02-11

## 2020-02-11 PROBLEM — I81 PORTAL VEIN THROMBOSIS: Status: RESOLVED | Noted: 2019-04-11 | Resolved: 2020-02-11

## 2020-02-11 PROBLEM — K31.9 GASTROPATHY: Status: RESOLVED | Noted: 2019-12-06 | Resolved: 2020-02-11

## 2020-02-11 PROBLEM — D69.6 THROMBOCYTOPENIA (H): Status: RESOLVED | Noted: 2019-11-20 | Resolved: 2020-02-11

## 2020-02-11 PROBLEM — R19.7 DIARRHEA: Status: RESOLVED | Noted: 2019-11-20 | Resolved: 2020-02-11

## 2020-02-11 PROBLEM — E87.0 HYPERNATREMIA: Status: RESOLVED | Noted: 2019-11-20 | Resolved: 2020-02-11

## 2020-02-11 PROCEDURE — 99606 MTMS BY PHARM EST 15 MIN: CPT | Performed by: PHARMACIST

## 2020-02-11 PROCEDURE — G0463 HOSPITAL OUTPT CLINIC VISIT: HCPCS | Mod: ZF

## 2020-02-11 PROCEDURE — 99607 MTMS BY PHARM ADDL 15 MIN: CPT | Performed by: PHARMACIST

## 2020-02-11 RX ORDER — FERROUS SULFATE 325(65) MG
325 TABLET ORAL
Qty: 90 TABLET | Refills: 3
Start: 2020-02-11 | End: 2020-11-24

## 2020-02-11 RX ORDER — ASPIRIN 81 MG/1
81 TABLET ORAL DAILY
Start: 2020-02-11 | End: 2020-07-09

## 2020-02-11 ASSESSMENT — PAIN SCALES - GENERAL: PAINLEVEL: MODERATE PAIN (5)

## 2020-02-11 ASSESSMENT — MIFFLIN-ST. JEOR: SCORE: 1552.94

## 2020-02-11 NOTE — NURSING NOTE
"Chief Complaint   Patient presents with     RECHECK     S/P Liver TX 11/11/2019       Vital signs:  Temp: 97.9  F (36.6  C) Temp src: Oral BP: 106/62 Pulse: 71   Resp: 18 SpO2: 100 %     Height: 175.3 cm (5' 9\") Weight: 73.3 kg (161 lb 8 oz)  Estimated body mass index is 23.85 kg/m  as calculated from the following:    Height as of this encounter: 1.753 m (5' 9\").    Weight as of this encounter: 73.3 kg (161 lb 8 oz).          Anne-Marie Boykin, [facility]  2/11/2020 9:54 AM        "

## 2020-02-11 NOTE — LETTER
2/11/2020      RE: Frandy Workman  [address]  [place] [address]       [facility]    Hepatology follow-up    Follow-up visit for liver transplant    Subjective:  55 year old male    OLT 11/11/19  - ESTRADA/HCC  - mikaela-op MELD= 12  - donor- donor age 63/local/DBD/ECD- hep B core positive/donor CMV(+)/recipient CMV(-)  - operation- CiT 7:39, EBL 2L, piggyback IVC, duct-to-duct biliary anastomosis  - post-op course- perihepatic hematoma; MMF colitis Dec 2019  - immunosuppression- FK, AZA    HCC  - dx Dec 2018  - MRI liver 12/23/18- 3.1cm lesion segment IVB, 1.1cm lesion segment III  - AFP 12/28/18= 5.2  - bone scan 1/4/19  - CT chest 1/4/19  - TACE 1/22/19 (seg IV); microwave ablation 1/23/19 (seg III)  - explant pathology- no viable tumor; moderately differentiated; no vascular invasion  - last MRI liver Jan 2020  - last AFP 10/23/19= 3.7    Patient comes to clinic this morning for follow-up after liver transplant.  Last clinic visit Oct 2019.  Patient underwent DDLT from HBV core antibody positive donor in November 2019.  Post-op course complicated with MMF colitis, JERONIMO, recurrent anemia, nausea due to delayed gastric emptying and failure to thrive.    Patient is doing OK today.  He feels that his thinking is clearer than before.  In fact, this is the only improvement he has noticed compared to pre-transplant.    Patient's nausea has improved with starting metoclopramide and has not vomited in a week.  This started in later December 2019 after switching MMF for azathioprine and occurs an hour or so after eating.  He has not been using prochlorperazine.  He is eating three square meals per day with difficulty.    Patient denies abdominal pain, jaundice or lower extremity edema.    Patient reports mild resting tremor that is affecting his ability to write.  He denies confusion or headache.    Patient denies fevers, sweats or chills.  He had an influenza vaccination earlier this  winter.    Weight is 11lbs lower than pre-transplant weight.  Appetite is good.  Oral intake has been limited by nausea as described above.    Patient does not drink alcohol.  His friend Davi had to go back to [place] soon after transplant.  The patient was in a TCU after his most recent hospital admission and has been home since Christmas.  His PCA is now only coming to his home for 2 hours per day (compared to 8 hours per day).      Medical hx Surgical hx   Past Medical History:   Diagnosis Date     Anemia 2013     Arthritis      BPH (benign prostatic hyperplasia)      CAD (coronary artery disease) 4/1/2019     Cholelithiasis      Conductive hearing loss 08/16/2017     Depressive disorder 1986    Suffer effects throughout life     Gastroesophageal reflux disease 12/01/2014     HCC (hepatocellular carcinoma) (H) 1/22/2019     History of diabetic retinopathy 07/2018     HTN (hypertension)      HTN (hypertension) 11/20/2019     Hyperlipidemia      Liver cirrhosis secondary to ESTRADA (H)      Liver transplanted (H) 11/11/2019     Portal vein thrombosis 4/11/2019     Type II diabetes mellitus (H)       Past Surgical History:   Procedure Laterality Date     COLONOSCOPY      2015     COLONOSCOPY N/A 12/6/2019    Procedure: COLONOSCOPY, WITH POLYPECTOMY AND BIOPSY;  Surgeon: Adam Morton MD;  Location:  GI     CV HEART CATHETERIZATION WITH POSSIBLE INTERVENTION N/A 2/26/2019    Procedure: CORS;  Surgeon: Jagdish Hoyt MD;  Location:  HEART CARDIAC CATH LAB     ESOPHAGOSCOPY, GASTROSCOPY, DUODENOSCOPY (EGD), COMBINED N/A 11/17/2016    Procedure: COMBINED ESOPHAGOSCOPY, GASTROSCOPY, DUODENOSCOPY (EGD);  Surgeon: Santi Rosas MD;  Location:  GI     ESOPHAGOSCOPY, GASTROSCOPY, DUODENOSCOPY (EGD), COMBINED N/A 11/17/2017    Procedure: COMBINED ESOPHAGOSCOPY, GASTROSCOPY, DUODENOSCOPY (EGD);  EGD;  Surgeon: Santi Rosas MD;  Location:  GI     ESOPHAGOSCOPY, GASTROSCOPY, DUODENOSCOPY (EGD),  COMBINED N/A 2018    Procedure: EGD;  Surgeon: Santi Rosas MD;  Location:  OR     ESOPHAGOSCOPY, GASTROSCOPY, DUODENOSCOPY (EGD), COMBINED N/A 2019    Procedure: ESOPHAGOGASTRODUODENOSCOPY, WITH BIOPSY;  Surgeon: Adam Morton MD;  Location: [facility] GI     HEAD & NECK SURGERY      2017 at [facility].      IMPLANT GOLD WEIGHT EYELID Right 2017    Procedure: IMPLANT WEIGHT EYELID;  Right Upper Eyelid Weight, right tarsal strip lower eyelid;  Surgeon: Milana Malave MD;  Location: [facility] OR     IR CHEMO EMBOLIZATION  2019     KNEE SURGERY Left      ORTHOPEDIC SURGERY       PAROTIDECTOMY, RADICAL NECK DISSECTION Right 2017    Procedure: PAROTIDECTOMY, RADICAL NECK DISSECTION;  Right Superfacial Parotidectomy , Facial nerve repair. with [facility] facial nerve monitor.;  Surgeon: Asiya Morgan MD;  Location: [facility] OR     PICC INSERTION Left 2017    4fr SL BioFlo PICC, 44cm in the L basilic vein w/ tip in the low SVC     RETURN LIVER TRANSPLANT N/A 2019    Procedure: Exploratory laparotomy, hematoma evacuation, abdominal washout;  Surgeon: Александр Ramos MD;  Location: [facility] OR     TRANSPLANT LIVER RECIPIENT  DONOR N/A 2019    Procedure: TRANSPLANT, LIVER, RECIPIENT,  DONOR;  Surgeon: Александр Ramos MD;  Location: [facility] OR     VASCULAR SURGERY            Medications  Prior to Admission medications    Medication Sig Start Date End Date Taking? Authorizing Provider   Acetaminophen (TYLENOL) 325 MG CAPS Take 325-650 mg by mouth every 4 hours as needed (pain, fever) 19  Yes Bebeto Park PA-C   alpha-lipoic acid 600 MG capsule Take 1 capsule (600 mg) by mouth daily 19  Yes Bebeto Park PA-C   Artificial Tear Solution (SM ARTIFICIAL TEARS) SOLN Place 1 drop into the right eye every hour as needed (dry eyes) Apply at least 4 times daily and as needed for dry eye 19  Yes Bebeto Park PA-C   aspirin (ASA) 325 MG EC  tablet Take 1 tablet (325 mg) by mouth daily 12/17/19  Yes Bebeto Park PA-C   azaTHIOprine (IMURAN) 50 MG tablet Take 2 tablets (100 mg) by mouth daily 1/13/20  Yes Александр Ramos MD   blood glucose (ACCU-CHEK EDINSON PLUS) test strip USE TO TEST FOUR TIMES DAILY OR AS DIRECTED 11/22/19  Yes Jennifer Wilcox MD   blood glucose monitoring (ACCU-CHEK FASTCLIX) lancets Use to test blood sugar 4 times daily or as directed.  1 box = 102 lancets 10/13/17  Yes Natalie Chun MD   econazole nitrate 1 % external cream Apply topically daily To feet and toenails. 7/31/19  Yes Lamin Gonzalez DPM   ferrous sulfate (FEROSUL) 325 (65 Fe) MG tablet Take 1 tablet (325 mg) by mouth daily (with breakfast) 2/11/20  Yes Santi Rosas MD   Glucagon 3 MG/DOSE POWD Spray 1 Dose in nostril as needed (for low blood sugar with sedation or emesis (unable to ingest glucose)) 1/9/20  Yes Tish Toscano PA-C   glucose (BD GLUCOSE) 4 g chewable tablet Take 1 tablet by mouth every hour as needed for low blood sugar 12/18/19  Yes Bebeto Park PA-C   insulin aspart (NOVOLOG PEN) 100 UNIT/ML pen Inject 1 unit : 8 grams carb + sliding scale 4 times daily Max units per day   80 units daily . 1/21/20  Yes Tish Toscano PA-C   insulin aspart (NOVOLOG PEN) 100 UNIT/ML pen Give with meals and snacks  Do Not give Correction Insulin if Pre-Meal BG less than 140.   For Pre-Meal  - 189 give 1 unit.   For Pre-Meal  - 239 give 2 units.   For Pre-Meal  - 289 give 3 units.   For Pre-Meal  - 339 give 4 units.   For Pre-Meal - 399 give 5 units.   For Pre-Meal -449 give 6 units  For Pre-Meal BG greater than or equal to 450 give 7 units.   To be given with prandial insulin, and based on pre-meal blood glucose.   If given at mealtime, administer within 30 minutes of start of meal 12/17/19  Yes Bebeto Park PA-C   insulin glargine (BASAGLAR KWIKPEN) 100  UNIT/ML pen Inject 28 Units Subcutaneous daily 1/9/20  Yes Tish Toscano PA-C   lamiVUDine (EPIVIR) 100 MG tablet Take 1 tablet (100 mg) by mouth daily 2/5/20  Yes Yonathan Chino MD   loperamide (IMODIUM) 2 MG capsule Take 1 capsule (2 mg) by mouth 4 times daily as needed for diarrhea 12/18/19  Yes Bebeto Park PA-C   magnesium oxide (MAG-OX) 400 MG tablet Take 1 tablet (400 mg) by mouth daily 1/14/20  Yes Yonathan Chino MD   melatonin 5 MG tablet Take 5 mg by mouth nightly as needed for sleep Take at 6 pm every night   Yes Reported, Patient   metoclopramide (REGLAN) 5 MG tablet Take 1 tablet (5 mg) by mouth 2 times daily (before meals) for 14 days 1/31/20 2/14/20 Yes Yonathan Chino MD   Multiple Vitamin (THERAVITE PO) Take 1 tablet by mouth every morning  3/14/16  Yes Reported, Patient   omeprazole (PRILOSEC) 40 MG DR capsule Take 1 capsule (40 mg) by mouth daily 12/17/19  Yes Bebeto Park PA-C   ondansetron (ZOFRAN) 4 MG tablet Take 1 tablet (4 mg) by mouth every 6 hours as needed for nausea or vomiting 12/18/19  Yes Bebeto Park PA-C   predniSONE (DELTASONE) 5 MG tablet Take 1 tablet (5 mg) by mouth daily 1/13/20  Yes Yonathan Chino MD   prochlorperazine (COMPAZINE) 5 MG tablet Take 1 tablet (5 mg) by mouth every 6 hours as needed for nausea or vomiting (use after Zofran) 12/10/19  Yes Maryanne Recio PA-C   rosuvastatin (CRESTOR) 5 MG tablet Take 1 tablet (5 mg) by mouth daily 12/17/19  Yes Bebeto Park PA-C   sertraline (ZOLOFT) 50 MG tablet Take 1 tablet (50 mg) by mouth daily 1/21/20  Yes Maximo Tucker DO   sodium bicarbonate 650 MG tablet Take 1 tablet (650 mg) by mouth 2 times daily 12/17/19  Yes Bebeto Park PA-C   sodium chloride (OCEAN) 0.65 % nasal spray Spray 1 spray into both nostrils every hour as needed for congestion 12/10/19  Yes Maryanne Recio PA-C   sulfamethoxazole-trimethoprim (BACTRIM/SEPTRA)  "400-80 MG tablet Take 1 tablet by mouth twice a week Monday and Thursday only 2/6/20  Yes Yonathan Chino MD   tacrolimus (GENERIC EQUIVALENT) 1 MG capsule Take 5 capsules (5 mg) by mouth 2 times daily 12/24/19  Yes Александр Ramos MD   tamsulosin (FLOMAX) 0.4 MG capsule TAKE 1 CAPSULE(0.4 MG) BY MOUTH DAILY 12/17/19  Yes Bebeto Park PA-C   valGANciclovir (VALCYTE) 450 MG tablet Take 1 tablet (450 mg) by mouth every other day 1/31/20  Yes Yonathan Chino MD   vitamin B-12 (CYANOCOBALAMIN) 1000 MCG tablet Take 1 tablet (1,000 mcg) by mouth daily 12/17/19  Yes Bebeto Park PA-C   vitamin D3 (CHOLECALCIFEROL) 2000 units (50 mcg) tablet Take 1 tablet (2,000 Units) by mouth daily 12/17/19  Yes Bebeto Park PA-C   BD VIKTORIA U/F 32G X 4 MM insulin pen needle Use 5 per day 1/21/20   Tish Toscano PA-C   blood glucose monitoring (ACCU-CHEK EDINSON PLUS) test strip Use to test blood sugar 4 times daily 10/13/17   Natalie Chun MD       Allergies  Allergies   Allergen Reactions     Codeine Other (See Comments)     Cannot take due to liver  Cannot tolerate oral narcotics     Seasonal Allergies      Sneezing, coughing, runny and itchy eyes       Review of systems  A 10-point review of systems was negative    Examination  /62 (BP Location: Left arm, Patient Position: Sitting, Cuff Size: Adult Regular)   Pulse 71   Temp 97.9  F (36.6  C) (Oral)   Resp 18   Ht 1.753 m (5' 9\")   Wt 73.3 kg (161 lb 8 oz)   SpO2 100%   BMI 23.85 kg/m     Body mass index is 23.85 kg/m .    Gen- well, NAD, A+Ox3, normal color  CVS- RRR  RS- CTA  Abd- OLT scar, striae, soft, non-tender, palpable splenomegaly, BS+  Extr- hands normal, no LEILA  Skin- no rash or jaundice  Neuro- fine tremor bilaterally  Psych- normal mood    Laboratory  Lab Results   Component Value Date     02/10/2020    POTASSIUM 4.6 02/10/2020    CHLORIDE 113 02/10/2020    CO2 23 02/10/2020    BUN 41 02/10/2020 "    CR 1.65 02/10/2020       Lab Results   Component Value Date    BILITOTAL 0.7 02/10/2020    ALT 21 02/10/2020    AST 19 02/10/2020    ALKPHOS 106 02/10/2020       Lab Results   Component Value Date    ALBUMIN 4.5 02/10/2020    PROTTOTAL 7.6 02/10/2020        Lab Results   Component Value Date    WBC 2.2 02/10/2020    HGB 8.6 02/10/2020    MCV 95 02/10/2020     02/10/2020       Lab Results   Component Value Date    INR 1.09 01/24/2020     Liver explant path Nov 2019 reviewed  Stomach, duodenal, colon bx Dec 2019 reviewed    Radiology  MRI liver Jan 2020 reviewed  AXR Dec 2019 reviewed- no biliary stent  EGD Dec 2019 personally reviewed  Colonoscopy Dec 2019 personally reviewed    Assessment  55 year old male who presents for post-transplant follow-up after liver transplant Nov 2019 for decompensated ESTRADA cirrhosis complicated with hepatic encephalopathy and HCC.  No evidence of viable HCC on explant pathology.  Liver function tests normal on current immunosuppression.  Renal function stable.  Delayed gastric emptying is related to medications (tacrolimus?, azathioprine?) in context of excellent glycemic control.  Recurrent anemia is multifactorial in etiology- will resume iron once daily until nutritional status improves.  Will also hold carvedilol for now- BP beyond goal and ongoing fatigue symptoms.    Plan  1.  Liver transplant  - continue FK, target trough 7-10  - wean azathioprine  - discontinue prednisone  - continue lamivudine 100mg PO Q24 for life  - reduce ASA to 81mg PO Q24 (for DM)    2.  Multifactorial anemia (inc medication-related)  - resume iron sulfate 325mg PO Q24 for now  - wean azathioprine  - wean Valcyte at 6 months post-transplant    3.  Delayed gastric emptying secondary to medications  - 6x Small, frequent meals daily  - Boost PO TID for now  - metoclopramide 5-10mg PO TID PRN   - stop ondansetron  - stop prochlorperazine    4.  Failure to thrive  - stop carvedilol  - stop outpatient  IV fluids    Follow-up in 3 months    Santi Banuelos MD  Hepatology  [facility]    I spent a total of 40 minutes face-to-face with Frandy Workman during today's office visit. Over 50% of this time was spent counseling the patient and/or coordinating care regarding immunosuppression, anemia, delayed gastric emptying, diabetes and kidney function.  See note for details.    Approximately 45 minutes of non face-to-face time were spent in review of the patient's medical record to date.  This included review of previous: clinic visits, hospital records, lab results, imaging studies, and procedural documentation.  The findings from this review are summarized in the above note.      Santi Banuelos MD

## 2020-02-11 NOTE — PROGRESS NOTES
[facility]    Hepatology follow-up    Follow-up visit for liver transplant    Subjective:  55 year old male    OLT 11/11/19  - ESTRADA/HCC  - mikaela-op MELD= 12  - donor- donor age 63/local/DBD/ECD- hep B core positive/donor CMV(+)/recipient CMV(-)  - operation- CiT 7:39, EBL 2L, piggyback IVC, duct-to-duct biliary anastomosis  - post-op course- perihepatic hematoma; MMF colitis Dec 2019  - immunosuppression- FK, AZA    HCC  - dx Dec 2018  - MRI liver 12/23/18- 3.1cm lesion segment IVB, 1.1cm lesion segment III  - AFP 12/28/18= 5.2  - bone scan 1/4/19  - CT chest 1/4/19  - TACE 1/22/19 (seg IV); microwave ablation 1/23/19 (seg III)  - explant pathology- no viable tumor; moderately differentiated; no vascular invasion  - last MRI liver Jan 2020  - last AFP 10/23/19= 3.7    Patient comes to clinic this morning for follow-up after liver transplant.  Last clinic visit Oct 2019.  Patient underwent DDLT from HBV core antibody positive donor in November 2019.  Post-op course complicated with MMF colitis, JERONIMO, recurrent anemia, nausea due to delayed gastric emptying and failure to thrive.    Patient is doing OK today.  He feels that his thinking is clearer than before.  In fact, this is the only improvement he has noticed compared to pre-transplant.    Patient's nausea has improved with starting metoclopramide and has not vomited in a week.  This started in later December 2019 after switching MMF for azathioprine and occurs an hour or so after eating.  He has not been using prochlorperazine.  He is eating three square meals per day with difficulty.    Patient denies abdominal pain, jaundice or lower extremity edema.    Patient reports mild resting tremor that is affecting his ability to write.  He denies confusion or headache.    Patient denies fevers, sweats or chills.  He had an influenza vaccination earlier this winter.    Weight is 11lbs lower than pre-transplant weight.  Appetite is good.  Oral  intake has been limited by nausea as described above.    Patient does not drink alcohol.  His friend Davi had to go back to [place] soon after transplant.  The patient was in a TCU after his most recent hospital admission and has been home since Christmas.  His PCA is now only coming to his home for 2 hours per day (compared to 8 hours per day).      Medical hx Surgical hx   Past Medical History:   Diagnosis Date     Anemia 2013     Arthritis      BPH (benign prostatic hyperplasia)      CAD (coronary artery disease) 4/1/2019     Cholelithiasis      Conductive hearing loss 08/16/2017     Depressive disorder 1986    Suffer effects throughout life     Gastroesophageal reflux disease 12/01/2014     HCC (hepatocellular carcinoma) (H) 1/22/2019     History of diabetic retinopathy 07/2018     HTN (hypertension)      HTN (hypertension) 11/20/2019     Hyperlipidemia      Liver cirrhosis secondary to ESTRADA (H)      Liver transplanted (H) 11/11/2019     Portal vein thrombosis 4/11/2019     Type II diabetes mellitus (H)       Past Surgical History:   Procedure Laterality Date     COLONOSCOPY      2015     COLONOSCOPY N/A 12/6/2019    Procedure: COLONOSCOPY, WITH POLYPECTOMY AND BIOPSY;  Surgeon: Adam Morton MD;  Location:  GI     CV HEART CATHETERIZATION WITH POSSIBLE INTERVENTION N/A 2/26/2019    Procedure: CORS;  Surgeon: Jagdish Hoyt MD;  Location:  HEART CARDIAC CATH LAB     ESOPHAGOSCOPY, GASTROSCOPY, DUODENOSCOPY (EGD), COMBINED N/A 11/17/2016    Procedure: COMBINED ESOPHAGOSCOPY, GASTROSCOPY, DUODENOSCOPY (EGD);  Surgeon: Santi Rosas MD;  Location:  GI     ESOPHAGOSCOPY, GASTROSCOPY, DUODENOSCOPY (EGD), COMBINED N/A 11/17/2017    Procedure: COMBINED ESOPHAGOSCOPY, GASTROSCOPY, DUODENOSCOPY (EGD);  EGD;  Surgeon: Santi Rosas MD;  Location:  GI     ESOPHAGOSCOPY, GASTROSCOPY, DUODENOSCOPY (EGD), COMBINED N/A 12/28/2018    Procedure: EGD;  Surgeon: Santi Rosas MD;   [facility]     ESOPHAGOSCOPY, GASTROSCOPY, DUODENOSCOPY (EGD), COMBINED N/A 2019    Procedure: ESOPHAGOGASTRODUODENOSCOPY, WITH BIOPSY;  Surgeon: Adam Morton MD;  Location: [facility] GI     HEAD & NECK SURGERY      2017 at [facility].      IMPLANT GOLD WEIGHT EYELID Right 2017    Procedure: IMPLANT WEIGHT EYELID;  Right Upper Eyelid Weight, right tarsal strip lower eyelid;  Surgeon: Milana Malave MD;  Location: [facility] OR     IR CHEMO EMBOLIZATION  2019     KNEE SURGERY Left      ORTHOPEDIC SURGERY       PAROTIDECTOMY, RADICAL NECK DISSECTION Right 2017    Procedure: PAROTIDECTOMY, RADICAL NECK DISSECTION;  Right Superfacial Parotidectomy , Facial nerve repair. with [facility] facial nerve monitor.;  Surgeon: Asiya Morgan MD;  Location: [facility] OR     PICC INSERTION Left 2017    4fr SL BioFlo PICC, 44cm in the L basilic vein w/ tip in the low SVC     RETURN LIVER TRANSPLANT N/A 2019    Procedure: Exploratory laparotomy, hematoma evacuation, abdominal washout;  Surgeon: Александр Ramos MD;  Location: [facility] OR     TRANSPLANT LIVER RECIPIENT  DONOR N/A 2019    Procedure: TRANSPLANT, LIVER, RECIPIENT,  DONOR;  Surgeon: Александр Ramos MD;  Location: [facility] OR     VASCULAR SURGERY            Medications  Prior to Admission medications    Medication Sig Start Date End Date Taking? Authorizing Provider   Acetaminophen (TYLENOL) 325 MG CAPS Take 325-650 mg by mouth every 4 hours as needed (pain, fever) 19  Yes Bebeto Park PA-C   alpha-lipoic acid 600 MG capsule Take 1 capsule (600 mg) by mouth daily 19  Yes Bebeto Park PA-C   Artificial Tear Solution (SM ARTIFICIAL TEARS) SOLN Place 1 drop into the right eye every hour as needed (dry eyes) Apply at least 4 times daily and as needed for dry eye 19  Yes Bebeto Park PA-C   aspirin (ASA) 325 MG EC tablet Take 1 tablet (325 mg) by mouth daily 19  Yes Bebeto Park  CLAUDIA   azaTHIOprine (IMURAN) 50 MG tablet Take 2 tablets (100 mg) by mouth daily 1/13/20  Yes Александр Ramos MD   blood glucose (ACCU-CHEK EDINSON PLUS) test strip USE TO TEST FOUR TIMES DAILY OR AS DIRECTED 11/22/19  Yes Jennifer Wilcox MD   blood glucose monitoring (ACCU-CHEK FASTCLIX) lancets Use to test blood sugar 4 times daily or as directed.  1 box = 102 lancets 10/13/17  Yes Natalie Chun MD   econazole nitrate 1 % external cream Apply topically daily To feet and toenails. 7/31/19  Yes Lamin Gonzalez DPM   ferrous sulfate (FEROSUL) 325 (65 Fe) MG tablet Take 1 tablet (325 mg) by mouth daily (with breakfast) 2/11/20  Yes Santi Rosas MD   Glucagon 3 MG/DOSE POWD Spray 1 Dose in nostril as needed (for low blood sugar with sedation or emesis (unable to ingest glucose)) 1/9/20  Yes Tish Toscano PA-C   glucose (BD GLUCOSE) 4 g chewable tablet Take 1 tablet by mouth every hour as needed for low blood sugar 12/18/19  Yes Bebeto Park PA-C   insulin aspart (NOVOLOG PEN) 100 UNIT/ML pen Inject 1 unit : 8 grams carb + sliding scale 4 times daily Max units per day   80 units daily . 1/21/20  Yes Tish Toscano PA-C   insulin aspart (NOVOLOG PEN) 100 UNIT/ML pen Give with meals and snacks  Do Not give Correction Insulin if Pre-Meal BG less than 140.   For Pre-Meal  - 189 give 1 unit.   For Pre-Meal  - 239 give 2 units.   For Pre-Meal  - 289 give 3 units.   For Pre-Meal  - 339 give 4 units.   For Pre-Meal - 399 give 5 units.   For Pre-Meal -449 give 6 units  For Pre-Meal BG greater than or equal to 450 give 7 units.   To be given with prandial insulin, and based on pre-meal blood glucose.   If given at mealtime, administer within 30 minutes of start of meal 12/17/19  Yes Bebeto Park PA-C   insulin glargine (BASAGLAR KWIKPEN) 100 UNIT/ML pen Inject 28 Units Subcutaneous daily 1/9/20  Yes Tish Toscano PA-C    lamiVUDine (EPIVIR) 100 MG tablet Take 1 tablet (100 mg) by mouth daily 2/5/20  Yes Yonathan Chino MD   loperamide (IMODIUM) 2 MG capsule Take 1 capsule (2 mg) by mouth 4 times daily as needed for diarrhea 12/18/19  Yes Bebeto Park PA-C   magnesium oxide (MAG-OX) 400 MG tablet Take 1 tablet (400 mg) by mouth daily 1/14/20  Yes Yonathan Chino MD   melatonin 5 MG tablet Take 5 mg by mouth nightly as needed for sleep Take at 6 pm every night   Yes Reported, Patient   metoclopramide (REGLAN) 5 MG tablet Take 1 tablet (5 mg) by mouth 2 times daily (before meals) for 14 days 1/31/20 2/14/20 Yes Yonathan Chino MD   Multiple Vitamin (THERAVITE PO) Take 1 tablet by mouth every morning  3/14/16  Yes Reported, Patient   omeprazole (PRILOSEC) 40 MG DR capsule Take 1 capsule (40 mg) by mouth daily 12/17/19  Yes Bebeto Park PA-C   ondansetron (ZOFRAN) 4 MG tablet Take 1 tablet (4 mg) by mouth every 6 hours as needed for nausea or vomiting 12/18/19  Yes Bebeto Park PA-C   predniSONE (DELTASONE) 5 MG tablet Take 1 tablet (5 mg) by mouth daily 1/13/20  Yes Yonathna Chino MD   prochlorperazine (COMPAZINE) 5 MG tablet Take 1 tablet (5 mg) by mouth every 6 hours as needed for nausea or vomiting (use after Zofran) 12/10/19  Yes Maryanne Recio PA-C   rosuvastatin (CRESTOR) 5 MG tablet Take 1 tablet (5 mg) by mouth daily 12/17/19  Yes Bebeto Park PA-C   sertraline (ZOLOFT) 50 MG tablet Take 1 tablet (50 mg) by mouth daily 1/21/20  Yes Maximo Tucker DO   sodium bicarbonate 650 MG tablet Take 1 tablet (650 mg) by mouth 2 times daily 12/17/19  Yes Bebeto Park PA-C   sodium chloride (OCEAN) 0.65 % nasal spray Spray 1 spray into both nostrils every hour as needed for congestion 12/10/19  Yes Maryanne Recio PA-C   sulfamethoxazole-trimethoprim (BACTRIM/SEPTRA) 400-80 MG tablet Take 1 tablet by mouth twice a week Monday and Thursday only  "2/6/20  Yes Yonathan Chino MD   tacrolimus (GENERIC EQUIVALENT) 1 MG capsule Take 5 capsules (5 mg) by mouth 2 times daily 12/24/19  Yes Александр Ramos MD   tamsulosin (FLOMAX) 0.4 MG capsule TAKE 1 CAPSULE(0.4 MG) BY MOUTH DAILY 12/17/19  Yes Bebeto Park PA-C   valGANciclovir (VALCYTE) 450 MG tablet Take 1 tablet (450 mg) by mouth every other day 1/31/20  Yes Yonathan Chino MD   vitamin B-12 (CYANOCOBALAMIN) 1000 MCG tablet Take 1 tablet (1,000 mcg) by mouth daily 12/17/19  Yes Bebeto Park PA-C   vitamin D3 (CHOLECALCIFEROL) 2000 units (50 mcg) tablet Take 1 tablet (2,000 Units) by mouth daily 12/17/19  Yes Bebeto Park PA-C   BD VIKTORIA U/F 32G X 4 MM insulin pen needle Use 5 per day 1/21/20   Tish Toscano PA-C   blood glucose monitoring (ACCU-CHEK EDINSON PLUS) test strip Use to test blood sugar 4 times daily 10/13/17   Natalie Chun MD       Allergies  Allergies   Allergen Reactions     Codeine Other (See Comments)     Cannot take due to liver  Cannot tolerate oral narcotics     Seasonal Allergies      Sneezing, coughing, runny and itchy eyes       Review of systems  A 10-point review of systems was negative    Examination  /62 (BP Location: Left arm, Patient Position: Sitting, Cuff Size: Adult Regular)   Pulse 71   Temp 97.9  F (36.6  C) (Oral)   Resp 18   Ht 1.753 m (5' 9\")   Wt 73.3 kg (161 lb 8 oz)   SpO2 100%   BMI 23.85 kg/m    Body mass index is 23.85 kg/m .    Gen- well, NAD, A+Ox3, normal color  CVS- RRR  RS- CTA  Abd- OLT scar, striae, soft, non-tender, palpable splenomegaly, BS+  Extr- hands normal, no LEILA  Skin- no rash or jaundice  Neuro- fine tremor bilaterally  Psych- normal mood    Laboratory  Lab Results   Component Value Date     02/10/2020    POTASSIUM 4.6 02/10/2020    CHLORIDE 113 02/10/2020    CO2 23 02/10/2020    BUN 41 02/10/2020    CR 1.65 02/10/2020       Lab Results   Component Value Date    BILITOTAL " 0.7 02/10/2020    ALT 21 02/10/2020    AST 19 02/10/2020    ALKPHOS 106 02/10/2020       Lab Results   Component Value Date    ALBUMIN 4.5 02/10/2020    PROTTOTAL 7.6 02/10/2020        Lab Results   Component Value Date    WBC 2.2 02/10/2020    HGB 8.6 02/10/2020    MCV 95 02/10/2020     02/10/2020       Lab Results   Component Value Date    INR 1.09 01/24/2020     Liver explant path Nov 2019 reviewed  Stomach, duodenal, colon bx Dec 2019 reviewed    Radiology  MRI liver Jan 2020 reviewed  AXR Dec 2019 reviewed- no biliary stent  EGD Dec 2019 personally reviewed  Colonoscopy Dec 2019 personally reviewed    Assessment  55 year old male who presents for post-transplant follow-up after liver transplant Nov 2019 for decompensated ESTRADA cirrhosis complicated with hepatic encephalopathy and HCC.  No evidence of viable HCC on explant pathology.  Liver function tests normal on current immunosuppression.  Renal function stable.  Delayed gastric emptying is related to medications (tacrolimus?, azathioprine?) in context of excellent glycemic control.  Recurrent anemia is multifactorial in etiology- will resume iron once daily until nutritional status improves.  Will also hold carvedilol for now- BP beyond goal and ongoing fatigue symptoms.    Plan  1.  Liver transplant  - continue FK, target trough 7-10  - wean azathioprine  - discontinue prednisone  - continue lamivudine 100mg PO Q24 for life  - reduce ASA to 81mg PO Q24 (for DM)    2.  Multifactorial anemia (inc medication-related)  - resume iron sulfate 325mg PO Q24 for now  - wean azathioprine  - wean Valcyte at 6 months post-transplant    3.  Delayed gastric emptying secondary to medications  - 6x Small, frequent meals daily  - Boost PO TID for now  - metoclopramide 5-10mg PO TID PRN   - stop ondansetron  - stop prochlorperazine    4.  Failure to thrive  - stop carvedilol  - stop outpatient IV fluids    Follow-up in 3 months    Santi Banuelos  MD  Hepatology  [facility]    I spent a total of 40 minutes face-to-face with Frandy Workman during today's office visit. Over 50% of this time was spent counseling the patient and/or coordinating care regarding immunosuppression, anemia, delayed gastric emptying, diabetes and kidney function.  See note for details.    Approximately 45 minutes of non face-to-face time were spent in review of the patient's medical record to date.  This included review of previous: clinic visits, hospital records, lab results, imaging studies, and procedural documentation.  The findings from this review are summarized in the above note.

## 2020-02-11 NOTE — LETTER
2/11/2020     RE: Frandy Workman  [address] [facility] [address]     Dear Colleague,    Thank you for referring your patient, Frandy Workman, to the [facility]PAT[facility] at [facility]. Please see a copy of my visit note below.    [facility]    Hepatology follow-up    Follow-up visit for liver transplant    Subjective:  55 year old male    OLT 11/11/19  - ESTRADA/HCC  - mikaela-op MELD= 12  - donor- donor age 63/local/DBD/ECD- hep B core positive/donor CMV(+)/recipient CMV(-)  - operation- CiT 7:39, EBL 2L, piggyback IVC, duct-to-duct biliary anastomosis  - post-op course- perihepatic hematoma; MMF colitis Dec 2019  - immunosuppression- FK, AZA    HCC  - dx Dec 2018  - MRI liver 12/23/18- 3.1cm lesion segment IVB, 1.1cm lesion segment III  - AFP 12/28/18= 5.2  - bone scan 1/4/19  - CT chest 1/4/19  - TACE 1/22/19 (seg IV); microwave ablation 1/23/19 (seg III)  - explant pathology- no viable tumor; moderately differentiated; no vascular invasion  - last MRI liver Jan 2020  - last AFP 10/23/19= 3.7    Patient comes to clinic this morning for follow-up after liver transplant.  Last clinic visit Oct 2019.  Patient underwent DDLT from HBV core antibody positive donor in November 2019.  Post-op course complicated with MMF colitis, JERONIMO, recurrent anemia, nausea due to delayed gastric emptying and failure to thrive.    Patient is doing OK today.  He feels that his thinking is clearer than before.  In fact, this is the only improvement he has noticed compared to pre-transplant.    Patient's nausea has improved with starting metoclopramide and has not vomited in a week.  This started in later December 2019 after switching MMF for azathioprine and occurs an hour or so after eating.  He has not been using prochlorperazine.  He is eating three square meals per day with difficulty.    Patient denies abdominal pain, jaundice or lower extremity edema.    Patient  reports mild resting tremor that is affecting his ability to write.  He denies confusion or headache.    Patient denies fevers, sweats or chills.  He had an influenza vaccination earlier this winter.    Weight is 11lbs lower than pre-transplant weight.  Appetite is good.  Oral intake has been limited by nausea as described above.    Patient does not drink alcohol.  His friend Davi had to go back to [place] soon after transplant.  The patient was in a TCU after his most recent hospital admission and has been home since Christmas.  His PCA is now only coming to his home for 2 hours per day (compared to 8 hours per day).      Medical hx Surgical hx   Past Medical History:   Diagnosis Date     Anemia 2013     Arthritis      BPH (benign prostatic hyperplasia)      CAD (coronary artery disease) 4/1/2019     Cholelithiasis      Conductive hearing loss 08/16/2017     Depressive disorder 1986    Suffer effects throughout life     Gastroesophageal reflux disease 12/01/2014     HCC (hepatocellular carcinoma) (H) 1/22/2019     History of diabetic retinopathy 07/2018     HTN (hypertension)      HTN (hypertension) 11/20/2019     Hyperlipidemia      Liver cirrhosis secondary to ESTRADA (H)      Liver transplanted (H) 11/11/2019     Portal vein thrombosis 4/11/2019     Type II diabetes mellitus (H)       Past Surgical History:   Procedure Laterality Date     COLONOSCOPY      2015     COLONOSCOPY N/A 12/6/2019    Procedure: COLONOSCOPY, WITH POLYPECTOMY AND BIOPSY;  Surgeon: Adam Morton MD;  Location:  GI     CV HEART CATHETERIZATION WITH POSSIBLE INTERVENTION N/A 2/26/2019    Procedure: CORS;  Surgeon: Jagdish Hoyt MD;  Location:  HEART CARDIAC CATH LAB     ESOPHAGOSCOPY, GASTROSCOPY, DUODENOSCOPY (EGD), COMBINED N/A 11/17/2016    Procedure: COMBINED ESOPHAGOSCOPY, GASTROSCOPY, DUODENOSCOPY (EGD);  Surgeon: Santi Rosas MD;  Location:  GI     ESOPHAGOSCOPY, GASTROSCOPY, DUODENOSCOPY (EGD), COMBINED N/A  2017    Procedure: COMBINED ESOPHAGOSCOPY, GASTROSCOPY, DUODENOSCOPY (EGD);  EGD;  Surgeon: Santi Rosas MD;  Location: [facility] GI     ESOPHAGOSCOPY, GASTROSCOPY, DUODENOSCOPY (EGD), COMBINED N/A 2018    Procedure: EGD;  Surgeon: Santi Rosas MD;  [facility]: [facility]     ESOPHAGOSCOPY, GASTROSCOPY, DUODENOSCOPY (EGD), COMBINED N/A 2019    Procedure: ESOPHAGOGASTRODUODENOSCOPY, WITH BIOPSY;  Surgeon: Adam Morton MD;  Location: [facility] GI     HEAD & NECK SURGERY      2017 at [facility].      IMPLANT GOLD WEIGHT EYELID Right 2017    Procedure: IMPLANT WEIGHT EYELID;  Right Upper Eyelid Weight, right tarsal strip lower eyelid;  Surgeon: Milana Malave MD;  Location: [facility] OR     IR CHEMO EMBOLIZATION  2019     KNEE SURGERY Left      ORTHOPEDIC SURGERY       PAROTIDECTOMY, RADICAL NECK DISSECTION Right 2017    Procedure: PAROTIDECTOMY, RADICAL NECK DISSECTION;  Right Superfacial Parotidectomy , Facial nerve repair. with [facility] facial nerve monitor.;  Surgeon: Asiya Morgan MD;  Location: [facility] OR     PICC INSERTION Left 2017    4fr SL BioFlo PICC, 44cm in the L basilic vein w/ tip in the low SVC     RETURN LIVER TRANSPLANT N/A 2019    Procedure: Exploratory laparotomy, hematoma evacuation, abdominal washout;  Surgeon: Александр Ramos MD;  Location: [facility] OR     TRANSPLANT LIVER RECIPIENT  DONOR N/A 2019    Procedure: TRANSPLANT, LIVER, RECIPIENT,  DONOR;  Surgeon: Александр Ramos MD;  Location: [facility] OR     VASCULAR SURGERY            Medications  Prior to Admission medications    Medication Sig Start Date End Date Taking? Authorizing Provider   Acetaminophen (TYLENOL) 325 MG CAPS Take 325-650 mg by mouth every 4 hours as needed (pain, fever) 19  Yes Bebeto Park PA-C   alpha-lipoic acid 600 MG capsule Take 1 capsule (600 mg) by mouth daily 19  Yes Bebeto Park PA-C   Artificial Tear Solution (SM ARTIFICIAL  TEARS) SOLN Place 1 drop into the right eye every hour as needed (dry eyes) Apply at least 4 times daily and as needed for dry eye 12/18/19  Yes Bebeto Park PA-C   aspirin (ASA) 325 MG EC tablet Take 1 tablet (325 mg) by mouth daily 12/17/19  Yes Bebeto Park PA-C   azaTHIOprine (IMURAN) 50 MG tablet Take 2 tablets (100 mg) by mouth daily 1/13/20  Yes Александр Ramos MD   blood glucose (ACCU-CHEK EDINSON PLUS) test strip USE TO TEST FOUR TIMES DAILY OR AS DIRECTED 11/22/19  Yes Jennifer Wilcox MD   blood glucose monitoring (ACCU-CHEK FASTCLIX) lancets Use to test blood sugar 4 times daily or as directed.  1 box = 102 lancets 10/13/17  Yes Natalie Chun MD   econazole nitrate 1 % external cream Apply topically daily To feet and toenails. 7/31/19  Yes Lamin Gonzalez DPM   ferrous sulfate (FEROSUL) 325 (65 Fe) MG tablet Take 1 tablet (325 mg) by mouth daily (with breakfast) 2/11/20  Yes Santi Rosas MD   Glucagon 3 MG/DOSE POWD Spray 1 Dose in nostril as needed (for low blood sugar with sedation or emesis (unable to ingest glucose)) 1/9/20  Yes Tish Toscano PA-C   glucose (BD GLUCOSE) 4 g chewable tablet Take 1 tablet by mouth every hour as needed for low blood sugar 12/18/19  Yes Bebeto Park PA-C   insulin aspart (NOVOLOG PEN) 100 UNIT/ML pen Inject 1 unit : 8 grams carb + sliding scale 4 times daily Max units per day   80 units daily . 1/21/20  Yes Tish Toscano PA-C   insulin aspart (NOVOLOG PEN) 100 UNIT/ML pen Give with meals and snacks  Do Not give Correction Insulin if Pre-Meal BG less than 140.   For Pre-Meal  - 189 give 1 unit.   For Pre-Meal  - 239 give 2 units.   For Pre-Meal  - 289 give 3 units.   For Pre-Meal  - 339 give 4 units.   For Pre-Meal - 399 give 5 units.   For Pre-Meal -449 give 6 units  For Pre-Meal BG greater than or equal to 450 give 7 units.   To be given with prandial  insulin, and based on pre-meal blood glucose.   If given at mealtime, administer within 30 minutes of start of meal 12/17/19  Yes Bebeto Park PA-C   insulin glargine (BASAGLAR KWIKPEN) 100 UNIT/ML pen Inject 28 Units Subcutaneous daily 1/9/20  Yes Tish Toscano PA-C   lamiVUDine (EPIVIR) 100 MG tablet Take 1 tablet (100 mg) by mouth daily 2/5/20  Yes Yonathan Chino MD   loperamide (IMODIUM) 2 MG capsule Take 1 capsule (2 mg) by mouth 4 times daily as needed for diarrhea 12/18/19  Yes Bebeto Park PA-C   magnesium oxide (MAG-OX) 400 MG tablet Take 1 tablet (400 mg) by mouth daily 1/14/20  Yes Yonathan Chino MD   melatonin 5 MG tablet Take 5 mg by mouth nightly as needed for sleep Take at 6 pm every night   Yes Reported, Patient   metoclopramide (REGLAN) 5 MG tablet Take 1 tablet (5 mg) by mouth 2 times daily (before meals) for 14 days 1/31/20 2/14/20 Yes Yonathan Chino MD   Multiple Vitamin (THERAVITE PO) Take 1 tablet by mouth every morning  3/14/16  Yes Reported, Patient   omeprazole (PRILOSEC) 40 MG DR capsule Take 1 capsule (40 mg) by mouth daily 12/17/19  Yes Bebeto Park PA-C   ondansetron (ZOFRAN) 4 MG tablet Take 1 tablet (4 mg) by mouth every 6 hours as needed for nausea or vomiting 12/18/19  Yes Bebeto Park PA-C   predniSONE (DELTASONE) 5 MG tablet Take 1 tablet (5 mg) by mouth daily 1/13/20  Yes Yonathan Chino MD   prochlorperazine (COMPAZINE) 5 MG tablet Take 1 tablet (5 mg) by mouth every 6 hours as needed for nausea or vomiting (use after Zofran) 12/10/19  Yes Maryanne Recio PA-C   rosuvastatin (CRESTOR) 5 MG tablet Take 1 tablet (5 mg) by mouth daily 12/17/19  Yes Bebeto Park PA-C   sertraline (ZOLOFT) 50 MG tablet Take 1 tablet (50 mg) by mouth daily 1/21/20  Yes Maximo Tucker DO   sodium bicarbonate 650 MG tablet Take 1 tablet (650 mg) by mouth 2 times daily 12/17/19  Yes Bebeto Park PA-C  "  sodium chloride (OCEAN) 0.65 % nasal spray Spray 1 spray into both nostrils every hour as needed for congestion 12/10/19  Yes Maryanne Recio PA-C   sulfamethoxazole-trimethoprim (BACTRIM/SEPTRA) 400-80 MG tablet Take 1 tablet by mouth twice a week Monday and Thursday only 2/6/20  Yes Yonathan Chino MD   tacrolimus (GENERIC EQUIVALENT) 1 MG capsule Take 5 capsules (5 mg) by mouth 2 times daily 12/24/19  Yes Александр Ramos MD   tamsulosin (FLOMAX) 0.4 MG capsule TAKE 1 CAPSULE(0.4 MG) BY MOUTH DAILY 12/17/19  Yes Bebeto Park PA-C   valGANciclovir (VALCYTE) 450 MG tablet Take 1 tablet (450 mg) by mouth every other day 1/31/20  Yes Yonathan Chino MD   vitamin B-12 (CYANOCOBALAMIN) 1000 MCG tablet Take 1 tablet (1,000 mcg) by mouth daily 12/17/19  Yes Bebeto Park PA-C   vitamin D3 (CHOLECALCIFEROL) 2000 units (50 mcg) tablet Take 1 tablet (2,000 Units) by mouth daily 12/17/19  Yes Bebeto Park PA-C   BD VIKTORIA U/F 32G X 4 MM insulin pen needle Use 5 per day 1/21/20   Tish Toscano PA-C   blood glucose monitoring (ACCU-CHEK EDINSON PLUS) test strip Use to test blood sugar 4 times daily 10/13/17   Natalie Chun MD       Allergies  Allergies   Allergen Reactions     Codeine Other (See Comments)     Cannot take due to liver  Cannot tolerate oral narcotics     Seasonal Allergies      Sneezing, coughing, runny and itchy eyes       Review of systems  A 10-point review of systems was negative    Examination  /62 (BP Location: Left arm, Patient Position: Sitting, Cuff Size: Adult Regular)   Pulse 71   Temp 97.9  F (36.6  C) (Oral)   Resp 18   Ht 1.753 m (5' 9\")   Wt 73.3 kg (161 lb 8 oz)   SpO2 100%   BMI 23.85 kg/m     Body mass index is 23.85 kg/m .    Gen- well, NAD, A+Ox3, normal color  CVS- RRR  RS- CTA  Abd- OLT scar, striae, soft, non-tender, palpable splenomegaly, BS+  Extr- hands normal, no LEILA  Skin- no rash or jaundice  Neuro- fine " tremor bilaterally  Psych- normal mood    Laboratory  Lab Results   Component Value Date     02/10/2020    POTASSIUM 4.6 02/10/2020    CHLORIDE 113 02/10/2020    CO2 23 02/10/2020    BUN 41 02/10/2020    CR 1.65 02/10/2020       Lab Results   Component Value Date    BILITOTAL 0.7 02/10/2020    ALT 21 02/10/2020    AST 19 02/10/2020    ALKPHOS 106 02/10/2020       Lab Results   Component Value Date    ALBUMIN 4.5 02/10/2020    PROTTOTAL 7.6 02/10/2020        Lab Results   Component Value Date    WBC 2.2 02/10/2020    HGB 8.6 02/10/2020    MCV 95 02/10/2020     02/10/2020       Lab Results   Component Value Date    INR 1.09 01/24/2020     Liver explant path Nov 2019 reviewed  Stomach, duodenal, colon bx Dec 2019 reviewed    Radiology  MRI liver Jan 2020 reviewed  AXR Dec 2019 reviewed- no biliary stent  EGD Dec 2019 personally reviewed  Colonoscopy Dec 2019 personally reviewed    Assessment  55 year old male who presents for post-transplant follow-up after liver transplant Nov 2019 for decompensated ESTRADA cirrhosis complicated with hepatic encephalopathy and HCC.  No evidence of viable HCC on explant pathology.  Liver function tests normal on current immunosuppression.  Renal function stable.  Delayed gastric emptying is related to medications (tacrolimus?, azathioprine?) in context of excellent glycemic control.  Recurrent anemia is multifactorial in etiology- will resume iron once daily until nutritional status improves.  Will also hold carvedilol for now- BP beyond goal and ongoing fatigue symptoms.    Plan  1.  Liver transplant  - continue FK, target trough 7-10  - wean azathioprine  - discontinue prednisone  - continue lamivudine 100mg PO Q24 for life  - reduce ASA to 81mg PO Q24 (for DM)    2.  Multifactorial anemia (inc medication-related)  - resume iron sulfate 325mg PO Q24 for now  - wean azathioprine  - wean Valcyte at 6 months post-transplant    3.  Delayed gastric emptying secondary to  medications  - 6x Small, frequent meals daily  - Boost PO TID for now  - metoclopramide 5-10mg PO TID PRN   - stop ondansetron  - stop prochlorperazine    4.  Failure to thrive  - stop carvedilol  - stop outpatient IV fluids    Follow-up in 3 months    Santi Banuelos MD  Hepatology  [facility]    I spent a total of 40 minutes face-to-face with Frandy Workman during today's office visit. Over 50% of this time was spent counseling the patient and/or coordinating care regarding immunosuppression, anemia, delayed gastric emptying, diabetes and kidney function.  See note for details.    Approximately 45 minutes of non face-to-face time were spent in review of the patient's medical record to date.  This included review of previous: clinic visits, hospital records, lab results, imaging studies, and procedural documentation.  The findings from this review are summarized in the above note.    Again, thank you for allowing me to participate in the care of your patient.      Sincerely,    Santi Banuelos MD

## 2020-02-11 NOTE — TELEPHONE ENCOUNTER
Patient Name: Frandy Workman   : 1965  MRN: [id number]        :  N/A    Jordan Active     Additional Information regarding appointment:  _____________________________________________________       Patient scheduled for:  EGD    Indication for procedure. Anemia, unspecified type     Sedation Type: C/S    Procedure Provider:  Vin                     Referring Provider. Carla Pineda; Maximo Tucker    Arrival time verified: .2020    Facility location verified:   [place] [address]  1st Floor, [address]    [x]? N/A for this Payor ([place])    Pt meets medical necessity for outpatient procedure in hospital Endoscopy Unit: N/A       History & Physical: [x]? N/A    Additional Information: Davi Saunders Jr (friend) CBO authorization confirmed prior to call.   __________________________________________________       Prep Type:   [x]? NPO /p 0300, No solid food /p 2200 the night before       Patient taking any blood thinners ? Yes  Anticoagulants or blood thinners:           [x]?  mg  - may continue              ................................................            Electronic implanted medical devices ? No       GI / Hepatology History: Yes: Cirrhosis, Multi, See EPIC       Heart disease ? Yes: CAD, HTN - See EPIC       Lung disease ? No       Sleep apnea ? No       Diabetic ? Yes, DM-2  [x]? Advised to contact Provider re: DM management pre/mikaela prep       Kidney disease ? Yes, CKD, Stage III  Hemodialysis: No       Instructions given: [x]? Rec'd & Read   [x]? Reviewed               Pre procedure teaching completed: [x]? Yes - Reviewed       IV Sedation: [x]? No questions regarding Sedation as ordered        : Transportation from procedure & responsible adult to be with patient following procedure for a minimum of 6 hrs (Conscious Sedation): [x]? Mauri Gentile - confirmed will have  post-procedure companionship as required       Pt completed assessment via:  [x]? Return MyChart query responses       _______________________________________________     Raquel Cadet RN  [facility]

## 2020-02-13 ENCOUNTER — HOSPITAL ENCOUNTER (OUTPATIENT)
Facility: CLINIC | Age: 55
Discharge: HOME | End: 2020-02-13
Attending: INTERNAL MEDICINE | Admitting: INTERNAL MEDICINE
Payer: MEDICARE

## 2020-02-13 ENCOUNTER — TELEPHONE (OUTPATIENT)
Dept: TRANSPLANT | Facility: CLINIC | Age: 55
End: 2020-02-13

## 2020-02-13 VITALS
RESPIRATION RATE: 21 BRPM | DIASTOLIC BLOOD PRESSURE: 60 MMHG | OXYGEN SATURATION: 98 % | HEART RATE: 67 BPM | SYSTOLIC BLOOD PRESSURE: 114 MMHG

## 2020-02-13 DIAGNOSIS — Z94.4 LIVER TRANSPLANT RECIPIENT (H): ICD-10-CM

## 2020-02-13 LAB
GLUCOSE BLDC GLUCOMTR-MCNC: 139 MG/DL (ref 70–99)
UPPER GI ENDOSCOPY: NORMAL

## 2020-02-13 PROCEDURE — 25000128 H RX IP 250 OP 636: Performed by: INTERNAL MEDICINE

## 2020-02-13 PROCEDURE — 87106 FUNGI IDENTIFICATION YEAST: CPT | Performed by: INTERNAL MEDICINE

## 2020-02-13 PROCEDURE — G0500 MOD SEDAT ENDO SERVICE >5YRS: HCPCS | Performed by: INTERNAL MEDICINE

## 2020-02-13 PROCEDURE — 43235 EGD DIAGNOSTIC BRUSH WASH: CPT | Performed by: INTERNAL MEDICINE

## 2020-02-13 PROCEDURE — 82962 GLUCOSE BLOOD TEST: CPT

## 2020-02-13 PROCEDURE — 25000125 ZZHC RX 250: Performed by: INTERNAL MEDICINE

## 2020-02-13 PROCEDURE — 87102 FUNGUS ISOLATION CULTURE: CPT | Performed by: INTERNAL MEDICINE

## 2020-02-13 PROCEDURE — 25000132 ZZH RX MED GY IP 250 OP 250 PS 637: Mod: GY | Performed by: INTERNAL MEDICINE

## 2020-02-13 RX ORDER — FENTANYL CITRATE 50 UG/ML
INJECTION, SOLUTION INTRAMUSCULAR; INTRAVENOUS PRN
Status: DISCONTINUED | OUTPATIENT
Start: 2020-02-13 | End: 2020-02-13 | Stop reason: HOSPADM

## 2020-02-13 RX ORDER — ONDANSETRON 2 MG/ML
4 INJECTION INTRAMUSCULAR; INTRAVENOUS EVERY 6 HOURS PRN
Status: CANCELLED | OUTPATIENT
Start: 2020-02-13

## 2020-02-13 RX ORDER — SIMETHICONE
LIQUID (ML) MISCELLANEOUS PRN
Status: DISCONTINUED | OUTPATIENT
Start: 2020-02-13 | End: 2020-02-13 | Stop reason: HOSPADM

## 2020-02-13 RX ORDER — NALOXONE HYDROCHLORIDE 0.4 MG/ML
.1-.4 INJECTION, SOLUTION INTRAMUSCULAR; INTRAVENOUS; SUBCUTANEOUS
Status: CANCELLED | OUTPATIENT
Start: 2020-02-13 | End: 2020-02-14

## 2020-02-13 RX ORDER — LIDOCAINE 40 MG/G
CREAM TOPICAL
Status: DISCONTINUED | OUTPATIENT
Start: 2020-02-13 | End: 2020-02-13 | Stop reason: HOSPADM

## 2020-02-13 RX ORDER — FLUMAZENIL 0.1 MG/ML
0.2 INJECTION, SOLUTION INTRAVENOUS
Status: CANCELLED | OUTPATIENT
Start: 2020-02-13 | End: 2020-02-13

## 2020-02-13 RX ORDER — ONDANSETRON 2 MG/ML
4 INJECTION INTRAMUSCULAR; INTRAVENOUS
Status: DISCONTINUED | OUTPATIENT
Start: 2020-02-13 | End: 2020-02-13 | Stop reason: HOSPADM

## 2020-02-13 RX ORDER — ONDANSETRON 4 MG/1
4 TABLET, ORALLY DISINTEGRATING ORAL EVERY 6 HOURS PRN
Status: CANCELLED | OUTPATIENT
Start: 2020-02-13

## 2020-02-13 NOTE — TELEPHONE ENCOUNTER
Attempted to leave patient a message to return call to discuss changes, but his phone went to dead air in the middle of the recording to leave a message. Called back same thing. Will attempt to reach patient later.

## 2020-02-14 ENCOUNTER — MEDICAL CORRESPONDENCE (OUTPATIENT)
Dept: HEALTH INFORMATION MANAGEMENT | Facility: CLINIC | Age: 55
End: 2020-02-14

## 2020-02-14 ENCOUNTER — TELEPHONE (OUTPATIENT)
Dept: TRANSPLANT | Facility: CLINIC | Age: 55
End: 2020-02-14

## 2020-02-14 DIAGNOSIS — B37.81 CANDIDA INFECTION, ESOPHAGEAL (H): Primary | ICD-10-CM

## 2020-02-14 LAB
ALBUMIN SERPL-MCNC: 4.4 G/DL (ref 3.4–5)
ALP SERPL-CCNC: 104 U/L (ref 40–150)
ALT SERPL W P-5'-P-CCNC: 24 U/L (ref 0–70)
ANION GAP SERPL CALCULATED.3IONS-SCNC: 4 MMOL/L (ref 3–14)
AST SERPL W P-5'-P-CCNC: 14 U/L (ref 0–45)
BILIRUB DIRECT SERPL-MCNC: 0.1 MG/DL (ref 0–0.2)
BILIRUB SERPL-MCNC: 0.6 MG/DL (ref 0.2–1.3)
BUN SERPL-MCNC: 49 MG/DL (ref 7–30)
CALCIUM SERPL-MCNC: 9.2 MG/DL (ref 8.5–10.1)
CHLORIDE SERPL-SCNC: 112 MMOL/L (ref 94–109)
CO2 SERPL-SCNC: 26 MMOL/L (ref 20–32)
CREAT SERPL-MCNC: 1.56 MG/DL (ref 0.66–1.25)
ERYTHROCYTE [DISTWIDTH] IN BLOOD BY AUTOMATED COUNT: 21.7 % (ref 10–15)
GFR SERPL CREATININE-BSD FRML MDRD: 49 ML/MIN/{1.73_M2}
GLUCOSE SERPL-MCNC: 134 MG/DL (ref 70–99)
HCT VFR BLD AUTO: 23.5 % (ref 40–53)
HGB BLD-MCNC: 8.1 G/DL (ref 13.3–17.7)
MAGNESIUM SERPL-MCNC: 2 MG/DL (ref 1.6–2.3)
MCH RBC QN AUTO: 32.8 PG (ref 26.5–33)
MCHC RBC AUTO-ENTMCNC: 34.5 G/DL (ref 31.5–36.5)
MCV RBC AUTO: 95 FL (ref 78–100)
PHOSPHATE SERPL-MCNC: 3.8 MG/DL (ref 2.5–4.5)
PLATELET # BLD AUTO: 93 10E9/L (ref 150–450)
POTASSIUM SERPL-SCNC: 4.8 MMOL/L (ref 3.4–5.3)
PROT SERPL-MCNC: 7.5 G/DL (ref 6.8–8.8)
RBC # BLD AUTO: 2.47 10E12/L (ref 4.4–5.9)
SODIUM SERPL-SCNC: 142 MMOL/L (ref 133–144)
TACROLIMUS BLD-MCNC: 5.7 UG/L (ref 5–15)
TME LAST DOSE: NORMAL H
WBC # BLD AUTO: 1.8 10E9/L (ref 4–11)

## 2020-02-14 PROCEDURE — 84100 ASSAY OF PHOSPHORUS: CPT | Performed by: SURGERY

## 2020-02-14 PROCEDURE — 83735 ASSAY OF MAGNESIUM: CPT | Performed by: SURGERY

## 2020-02-14 PROCEDURE — 80048 BASIC METABOLIC PNL TOTAL CA: CPT | Performed by: SURGERY

## 2020-02-14 PROCEDURE — 85027 COMPLETE CBC AUTOMATED: CPT | Performed by: SURGERY

## 2020-02-14 PROCEDURE — 80076 HEPATIC FUNCTION PANEL: CPT | Performed by: SURGERY

## 2020-02-14 PROCEDURE — 80197 ASSAY OF TACROLIMUS: CPT | Performed by: SURGERY

## 2020-02-14 RX ORDER — FLUCONAZOLE 200 MG/1
TABLET ORAL
Qty: 15 TABLET | Refills: 0 | Status: SHIPPED | OUTPATIENT
Start: 2020-02-14 | End: 2020-03-04

## 2020-02-14 RX ORDER — AZATHIOPRINE 50 MG/1
50 TABLET ORAL DAILY
Qty: 15 TABLET | Refills: 0 | Status: SHIPPED | OUTPATIENT
Start: 2020-02-14 | End: 2020-02-19

## 2020-02-14 NOTE — TELEPHONE ENCOUNTER
Pt has esophageal cathy. Per Dr Banuelos patient to start fluconazole 400 mg x1 day and then 200 mg x 13 days. Pt will receive via [facility]. Patient knows to call immediately when shipment arrives to make a plan with his tacrolimus as it will be easier for him to understand when medication arrives or he will go to overwhelmed. Patient agreeable with plan.

## 2020-02-14 NOTE — TELEPHONE ENCOUNTER
Discussed medication changes with patient. Patient to decrease ASA to 81 mg. Pt to stop prednisone and decrease imuran to 50 mg daily.  Pt updated medication card and took notes in his notebook for changes and verbalizes understanding.

## 2020-02-17 DIAGNOSIS — N18.2 CKD (CHRONIC KIDNEY DISEASE) STAGE 2, GFR 60-89 ML/MIN: Primary | ICD-10-CM

## 2020-02-18 ENCOUNTER — MEDICAL CORRESPONDENCE (OUTPATIENT)
Dept: HEALTH INFORMATION MANAGEMENT | Facility: CLINIC | Age: 55
End: 2020-02-18

## 2020-02-18 DIAGNOSIS — Z94.4 LIVER TRANSPLANT RECIPIENT (H): ICD-10-CM

## 2020-02-18 LAB
ALBUMIN SERPL-MCNC: 4.1 G/DL (ref 3.4–5)
ALP SERPL-CCNC: 113 U/L (ref 40–150)
ALT SERPL W P-5'-P-CCNC: 26 U/L (ref 0–70)
ANION GAP SERPL CALCULATED.3IONS-SCNC: 6 MMOL/L (ref 3–14)
AST SERPL W P-5'-P-CCNC: 14 U/L (ref 0–45)
BILIRUB DIRECT SERPL-MCNC: 0.1 MG/DL (ref 0–0.2)
BILIRUB SERPL-MCNC: 0.7 MG/DL (ref 0.2–1.3)
BUN SERPL-MCNC: 45 MG/DL (ref 7–30)
CALCIUM SERPL-MCNC: 9 MG/DL (ref 8.5–10.1)
CHLORIDE SERPL-SCNC: 112 MMOL/L (ref 94–109)
CO2 SERPL-SCNC: 24 MMOL/L (ref 20–32)
CREAT SERPL-MCNC: 1.58 MG/DL (ref 0.66–1.25)
ERYTHROCYTE [DISTWIDTH] IN BLOOD BY AUTOMATED COUNT: 23.8 % (ref 10–15)
GFR SERPL CREATININE-BSD FRML MDRD: 48 ML/MIN/{1.73_M2}
GLUCOSE SERPL-MCNC: 142 MG/DL (ref 70–99)
HCT VFR BLD AUTO: 22.2 % (ref 40–53)
HGB BLD-MCNC: 7.5 G/DL (ref 13.3–17.7)
MAGNESIUM SERPL-MCNC: 1.9 MG/DL (ref 1.6–2.3)
MCH RBC QN AUTO: 33 PG (ref 26.5–33)
MCHC RBC AUTO-ENTMCNC: 33.8 G/DL (ref 31.5–36.5)
MCV RBC AUTO: 98 FL (ref 78–100)
PHOSPHATE SERPL-MCNC: 3.5 MG/DL (ref 2.5–4.5)
PLATELET # BLD AUTO: 95 10E9/L (ref 150–450)
POTASSIUM SERPL-SCNC: 5.1 MMOL/L (ref 3.4–5.3)
PROT SERPL-MCNC: 7.2 G/DL (ref 6.8–8.8)
RBC # BLD AUTO: 2.27 10E12/L (ref 4.4–5.9)
SODIUM SERPL-SCNC: 142 MMOL/L (ref 133–144)
TACROLIMUS BLD-MCNC: 7.4 UG/L (ref 5–15)
TME LAST DOSE: NORMAL H
WBC # BLD AUTO: 1.5 10E9/L (ref 4–11)

## 2020-02-18 PROCEDURE — 85027 COMPLETE CBC AUTOMATED: CPT | Performed by: SURGERY

## 2020-02-18 PROCEDURE — 80076 HEPATIC FUNCTION PANEL: CPT | Performed by: SURGERY

## 2020-02-18 PROCEDURE — 80048 BASIC METABOLIC PNL TOTAL CA: CPT | Performed by: SURGERY

## 2020-02-18 PROCEDURE — 84100 ASSAY OF PHOSPHORUS: CPT | Performed by: SURGERY

## 2020-02-18 PROCEDURE — 83735 ASSAY OF MAGNESIUM: CPT | Performed by: SURGERY

## 2020-02-18 PROCEDURE — 80197 ASSAY OF TACROLIMUS: CPT | Performed by: SURGERY

## 2020-02-19 ENCOUNTER — TELEPHONE (OUTPATIENT)
Dept: TRANSPLANT | Facility: CLINIC | Age: 55
End: 2020-02-19

## 2020-02-19 NOTE — TELEPHONE ENCOUNTER
Spoke with patient. He is to stop imuran per Dr Banuelos and repeat labs next week. Patient wrote down plan and repeated it.     Patient did not receive fluconazole in the mail yet. He said it will arrive on Friday. Patient will call on Friday so we can make a plan with his tacrolimus so he is not feeling overwhelmed.   Patient reports that he feels his stomach is settling down, but fatigue is still bothersome, but slightly getting better.

## 2020-02-19 NOTE — TELEPHONE ENCOUNTER
Left message for patient to return call.    Left message for POOJA Cadet RN, that we are stopping imuran and that he can do weekly labs per DR Christy.

## 2020-02-21 ENCOUNTER — TELEPHONE (OUTPATIENT)
Dept: ENDOCRINOLOGY | Facility: CLINIC | Age: 55
End: 2020-02-21

## 2020-02-21 NOTE — TELEPHONE ENCOUNTER
Health Call Center    Phone Message    May a detailed message be left on voicemail: yes     Reason for Call:  Other:  Patient called as he got a letter from the state that his 's license will be revoked as of 3/5/20, as Tish Toscano had completed the DMV Insulin Report Form and faxed to DMV on 2/11/20.  Patient stated he noticed on question 4, that Tish had marked that patient was not qualified to drive a vehicle and no reason was given.  Please follow up with patient.  Thank you!    Action Taken: Message routed to:  [facility]    Travel Screening: Not Applicable

## 2020-02-24 ENCOUNTER — MEDICAL CORRESPONDENCE (OUTPATIENT)
Dept: HEALTH INFORMATION MANAGEMENT | Facility: CLINIC | Age: 55
End: 2020-02-24

## 2020-02-24 DIAGNOSIS — Z94.4 LIVER TRANSPLANT RECIPIENT (H): ICD-10-CM

## 2020-02-24 DIAGNOSIS — F41.9 ANXIETY: ICD-10-CM

## 2020-02-24 DIAGNOSIS — F32.A DEPRESSION: Primary | ICD-10-CM

## 2020-02-24 DIAGNOSIS — F31.70 BIPOLAR AFFECTIVE DISORDER IN REMISSION (H): ICD-10-CM

## 2020-02-24 LAB
ALBUMIN SERPL-MCNC: 4.1 G/DL (ref 3.4–5)
ALP SERPL-CCNC: 129 U/L (ref 40–150)
ALT SERPL W P-5'-P-CCNC: 23 U/L (ref 0–70)
ANION GAP SERPL CALCULATED.3IONS-SCNC: 6 MMOL/L (ref 3–14)
AST SERPL W P-5'-P-CCNC: 19 U/L (ref 0–45)
BILIRUB DIRECT SERPL-MCNC: <0.1 MG/DL (ref 0–0.2)
BILIRUB SERPL-MCNC: 0.6 MG/DL (ref 0.2–1.3)
BUN SERPL-MCNC: 43 MG/DL (ref 7–30)
CALCIUM SERPL-MCNC: 9.2 MG/DL (ref 8.5–10.1)
CHLORIDE SERPL-SCNC: 114 MMOL/L (ref 94–109)
CO2 SERPL-SCNC: 20 MMOL/L (ref 20–32)
CREAT SERPL-MCNC: 1.67 MG/DL (ref 0.66–1.25)
ERYTHROCYTE [DISTWIDTH] IN BLOOD BY AUTOMATED COUNT: 25.2 % (ref 10–15)
GFR SERPL CREATININE-BSD FRML MDRD: 45 ML/MIN/{1.73_M2}
GLUCOSE SERPL-MCNC: 125 MG/DL (ref 70–99)
HCT VFR BLD AUTO: 21.1 % (ref 40–53)
HGB BLD-MCNC: 7.1 G/DL (ref 13.3–17.7)
MAGNESIUM SERPL-MCNC: 2.1 MG/DL (ref 1.6–2.3)
MCH RBC QN AUTO: 34 PG (ref 26.5–33)
MCHC RBC AUTO-ENTMCNC: 33.6 G/DL (ref 31.5–36.5)
MCV RBC AUTO: 101 FL (ref 78–100)
PHOSPHATE SERPL-MCNC: 3.7 MG/DL (ref 2.5–4.5)
PLATELET # BLD AUTO: 86 10E9/L (ref 150–450)
POTASSIUM SERPL-SCNC: 4.7 MMOL/L (ref 3.4–5.3)
PROT SERPL-MCNC: 7.6 G/DL (ref 6.8–8.8)
RBC # BLD AUTO: 2.09 10E12/L (ref 4.4–5.9)
SODIUM SERPL-SCNC: 140 MMOL/L (ref 133–144)
WBC # BLD AUTO: 2.6 10E9/L (ref 4–11)

## 2020-02-24 PROCEDURE — 80076 HEPATIC FUNCTION PANEL: CPT | Performed by: SURGERY

## 2020-02-24 PROCEDURE — 80197 ASSAY OF TACROLIMUS: CPT | Performed by: SURGERY

## 2020-02-24 PROCEDURE — 80048 BASIC METABOLIC PNL TOTAL CA: CPT | Performed by: SURGERY

## 2020-02-24 PROCEDURE — 84100 ASSAY OF PHOSPHORUS: CPT | Performed by: SURGERY

## 2020-02-24 PROCEDURE — 85027 COMPLETE CBC AUTOMATED: CPT | Performed by: SURGERY

## 2020-02-24 PROCEDURE — 83735 ASSAY OF MAGNESIUM: CPT | Performed by: SURGERY

## 2020-02-24 RX ORDER — TACROLIMUS 1 MG/1
2 CAPSULE ORAL 2 TIMES DAILY
Qty: 120 CAPSULE | Refills: 0 | Status: SHIPPED | OUTPATIENT
Start: 2020-02-24 | End: 2020-02-25

## 2020-02-24 NOTE — TELEPHONE ENCOUNTER
We need a new rx for dose change on Tacrolimus 1mg taking 2ng BID    Thank you  Rose Hernández [facility]

## 2020-02-24 NOTE — TELEPHONE ENCOUNTER
Last Clinic Visit: 1/21/2020  [facility] (staffed by Dr Moe)    PHQ-9 above parameters.  Last visit note reviewed: Depression  Anxiety - Continue sertraline 50 mg daily   PHQ-9 score:    PHQ 1/9/2020   PHQ-9 Total Score 16   Q9: Thoughts of better off dead/self-harm past 2 weeks Not at all            Refill sent and FYI to provider.

## 2020-02-25 ENCOUNTER — TELEPHONE (OUTPATIENT)
Dept: TRANSPLANT | Facility: CLINIC | Age: 55
End: 2020-02-25

## 2020-02-25 ENCOUNTER — TELEPHONE (OUTPATIENT)
Dept: ENDOCRINOLOGY | Facility: CLINIC | Age: 55
End: 2020-02-25

## 2020-02-25 DIAGNOSIS — Z94.4 LIVER TRANSPLANT RECIPIENT (H): ICD-10-CM

## 2020-02-25 LAB
TACROLIMUS BLD-MCNC: 10.4 UG/L (ref 5–15)
TME LAST DOSE: NORMAL H

## 2020-02-25 RX ORDER — TACROLIMUS 1 MG/1
CAPSULE ORAL
Qty: 90 CAPSULE | Refills: 0 | Status: SHIPPED | OUTPATIENT
Start: 2020-02-25 | End: 2020-03-10 | Stop reason: DRUGHIGH

## 2020-02-25 NOTE — TELEPHONE ENCOUNTER
ISSUE:   Tacrolimus IR level 10.4 on 02/24/2020 goal 6-8, dose 2 mg every 12 hours.    PLAN:   Please call patient and confirm this was an accurate 12-hour trough. Verify Tacrolimus IR dose 2 mg every 12 hours. Confirm no new medications or illness. Confirm no missed doses. If accurate trough and accurate dose, decrease Tacrolimus IR dose to 2 mg AM and 1 mg PM and repeat labs in 1 week.     OUTCOME:   Spoke with patient, they confirm accurate trough level and current dose 2  mg every 12 hours. Patient confirmed dose change to 2 mg AM and 1 mg PM and to repeat labs in 1 week. Orders sent to [facility] pharmacy for dose change and lab for repeat labs. Patient voiced understanding of plan.

## 2020-02-25 NOTE — TELEPHONE ENCOUNTER
DMV form corrected by  Tish Toscano  stating he is competent to drive. Faxed X2 to [facility] and copy mailed to [facility] home address. Brisa Woods RN on 2/25/2020 at 12:03 PM

## 2020-02-28 ENCOUNTER — TELEPHONE (OUTPATIENT)
Dept: NEPHROLOGY | Facility: CLINIC | Age: 55
End: 2020-02-28

## 2020-02-28 ASSESSMENT — ENCOUNTER SYMPTOMS
DECREASED APPETITE: 0
HYPOTENSION: 1
SINUS PAIN: 1
LIGHT-HEADEDNESS: 1
POLYDIPSIA: 0
HEARTBURN: 0
SWOLLEN GLANDS: 0
SINUS CONGESTION: 0
CHILLS: 1
JAUNDICE: 0
BOWEL INCONTINENCE: 0
EXERCISE INTOLERANCE: 1
NECK MASS: 0
WEIGHT LOSS: 0
POOR WOUND HEALING: 0
DIARRHEA: 0
MYALGIAS: 1
FATIGUE: 1
EYE WATERING: 0
SKIN CHANGES: 0
HOARSE VOICE: 0
NAIL CHANGES: 0
NAUSEA: 1
BACK PAIN: 1
ABDOMINAL PAIN: 1
TASTE DISTURBANCE: 0
CONSTIPATION: 0
EYE IRRITATION: 1
BLOATING: 0
LEG PAIN: 0
PALPITATIONS: 1
STIFFNESS: 1
INCREASED ENERGY: 1
MUSCLE CRAMPS: 0
TROUBLE SWALLOWING: 0
SORE THROAT: 0
SLEEP DISTURBANCES DUE TO BREATHING: 0
MUSCLE WEAKNESS: 1
SYNCOPE: 0
ARTHRALGIAS: 0
RECTAL PAIN: 0
WEIGHT GAIN: 0
NECK PAIN: 1
FEVER: 0
EYE REDNESS: 0
ALTERED TEMPERATURE REGULATION: 1
BLOOD IN STOOL: 0
ORTHOPNEA: 0
JOINT SWELLING: 0
EYE PAIN: 0
SMELL DISTURBANCE: 0
HYPERTENSION: 0
DOUBLE VISION: 0
NIGHT SWEATS: 0
BRUISES/BLEEDS EASILY: 0
POLYPHAGIA: 0
HALLUCINATIONS: 0
VOMITING: 1

## 2020-02-28 NOTE — TELEPHONE ENCOUNTER
Patient contacted and reminded of upcoming appointment.  Patient confirmed they will be attending.  Patient instructed to bring updated medications list to appointment.Jean Kohler/SIRISHA  February 28, 2020 12:00 PM

## 2020-03-02 ENCOUNTER — OFFICE VISIT (OUTPATIENT)
Dept: NEPHROLOGY | Facility: CLINIC | Age: 55
End: 2020-03-02
Attending: INTERNAL MEDICINE
Payer: MEDICARE

## 2020-03-02 ENCOUNTER — NURSE TRIAGE (OUTPATIENT)
Dept: NURSING | Facility: CLINIC | Age: 55
End: 2020-03-02

## 2020-03-02 ENCOUNTER — MEDICAL CORRESPONDENCE (OUTPATIENT)
Dept: HEALTH INFORMATION MANAGEMENT | Facility: CLINIC | Age: 55
End: 2020-03-02

## 2020-03-02 VITALS
OXYGEN SATURATION: 100 % | BODY MASS INDEX: 24.41 KG/M2 | TEMPERATURE: 97.5 F | HEIGHT: 69 IN | DIASTOLIC BLOOD PRESSURE: 62 MMHG | SYSTOLIC BLOOD PRESSURE: 108 MMHG | WEIGHT: 164.8 LBS | HEART RATE: 98 BPM

## 2020-03-02 DIAGNOSIS — N18.30 CKD (CHRONIC KIDNEY DISEASE) STAGE 3, GFR 30-59 ML/MIN (H): Primary | ICD-10-CM

## 2020-03-02 DIAGNOSIS — D63.8 ANEMIA IN OTHER CHRONIC DISEASES CLASSIFIED ELSEWHERE: ICD-10-CM

## 2020-03-02 DIAGNOSIS — Z94.4 LIVER TRANSPLANT RECIPIENT (H): Primary | ICD-10-CM

## 2020-03-02 DIAGNOSIS — Z13.220 LIPID SCREENING: ICD-10-CM

## 2020-03-02 DIAGNOSIS — Z94.4 LIVER REPLACED BY TRANSPLANT (H): ICD-10-CM

## 2020-03-02 DIAGNOSIS — N18.2 CKD (CHRONIC KIDNEY DISEASE) STAGE 2, GFR 60-89 ML/MIN: ICD-10-CM

## 2020-03-02 DIAGNOSIS — Z94.4 LIVER TRANSPLANT RECIPIENT (H): ICD-10-CM

## 2020-03-02 DIAGNOSIS — E11.3313 TYPE 2 DIABETES MELLITUS WITH MODERATE NONPROLIFERATIVE RETINOPATHY OF BOTH EYES AND MACULAR EDEMA, UNSPECIFIED WHETHER LONG TERM INSULIN USE (H): Primary | ICD-10-CM

## 2020-03-02 LAB
ABO + RH BLD: NORMAL
ABO + RH BLD: NORMAL
ALBUMIN SERPL-MCNC: 4 G/DL (ref 3.4–5)
ALBUMIN SERPL-MCNC: 4.2 G/DL (ref 3.4–5)
ALP SERPL-CCNC: 146 U/L (ref 40–150)
ALP SERPL-CCNC: 157 U/L (ref 40–150)
ALT SERPL W P-5'-P-CCNC: 21 U/L (ref 0–70)
ALT SERPL W P-5'-P-CCNC: 25 U/L (ref 0–70)
ANION GAP SERPL CALCULATED.3IONS-SCNC: 6 MMOL/L (ref 3–14)
ANION GAP SERPL CALCULATED.3IONS-SCNC: 8 MMOL/L (ref 3–14)
AST SERPL W P-5'-P-CCNC: 13 U/L (ref 0–45)
AST SERPL W P-5'-P-CCNC: 15 U/L (ref 0–45)
BILIRUB DIRECT SERPL-MCNC: 0.1 MG/DL (ref 0–0.2)
BILIRUB DIRECT SERPL-MCNC: 0.1 MG/DL (ref 0–0.2)
BILIRUB SERPL-MCNC: 0.4 MG/DL (ref 0.2–1.3)
BILIRUB SERPL-MCNC: 0.5 MG/DL (ref 0.2–1.3)
BLD GP AB SCN SERPL QL: NORMAL
BLD PROD TYP BPU: NORMAL
BLOOD BANK CMNT PATIENT-IMP: NORMAL
BUN SERPL-MCNC: 54 MG/DL (ref 7–30)
BUN SERPL-MCNC: 56 MG/DL (ref 7–30)
CALCIUM SERPL-MCNC: 9.5 MG/DL (ref 8.5–10.1)
CALCIUM SERPL-MCNC: 9.5 MG/DL (ref 8.5–10.1)
CHLORIDE SERPL-SCNC: 112 MMOL/L (ref 94–109)
CHLORIDE SERPL-SCNC: 113 MMOL/L (ref 94–109)
CO2 SERPL-SCNC: 21 MMOL/L (ref 20–32)
CO2 SERPL-SCNC: 21 MMOL/L (ref 20–32)
CREAT SERPL-MCNC: 1.91 MG/DL (ref 0.66–1.25)
CREAT SERPL-MCNC: 1.92 MG/DL (ref 0.66–1.25)
CREAT UR-MCNC: 101 MG/DL
ERYTHROCYTE [DISTWIDTH] IN BLOOD BY AUTOMATED COUNT: ABNORMAL % (ref 10–15)
ERYTHROCYTE [DISTWIDTH] IN BLOOD BY AUTOMATED COUNT: ABNORMAL % (ref 10–15)
GFR SERPL CREATININE-BSD FRML MDRD: 38 ML/MIN/{1.73_M2}
GFR SERPL CREATININE-BSD FRML MDRD: 38 ML/MIN/{1.73_M2}
GLUCOSE SERPL-MCNC: 143 MG/DL (ref 70–99)
GLUCOSE SERPL-MCNC: 170 MG/DL (ref 70–99)
HCT VFR BLD AUTO: 20.2 % (ref 40–53)
HCT VFR BLD AUTO: 22.6 % (ref 40–53)
HGB BLD-MCNC: 6.6 G/DL (ref 13.3–17.7)
HGB BLD-MCNC: 7.6 G/DL (ref 13.3–17.7)
MAGNESIUM SERPL-MCNC: 2.1 MG/DL (ref 1.6–2.3)
MAGNESIUM SERPL-MCNC: 2.3 MG/DL (ref 1.6–2.3)
MCH RBC QN AUTO: 34.2 PG (ref 26.5–33)
MCH RBC QN AUTO: 34.9 PG (ref 26.5–33)
MCHC RBC AUTO-ENTMCNC: 32.7 G/DL (ref 31.5–36.5)
MCHC RBC AUTO-ENTMCNC: 33.6 G/DL (ref 31.5–36.5)
MCV RBC AUTO: 102 FL (ref 78–100)
MCV RBC AUTO: 107 FL (ref 78–100)
MICROALBUMIN UR-MCNC: 72 MG/L
MICROALBUMIN/CREAT UR: 70.89 MG/G CR (ref 0–17)
NUM BPU REQUESTED: 1
PHOSPHATE SERPL-MCNC: 4 MG/DL (ref 2.5–4.5)
PHOSPHATE SERPL-MCNC: 4.3 MG/DL (ref 2.5–4.5)
PLATELET # BLD AUTO: 75 10E9/L (ref 150–450)
PLATELET # BLD AUTO: 91 10E9/L (ref 150–450)
POTASSIUM SERPL-SCNC: 5 MMOL/L (ref 3.4–5.3)
POTASSIUM SERPL-SCNC: 5.6 MMOL/L (ref 3.4–5.3)
PROT SERPL-MCNC: 7.6 G/DL (ref 6.8–8.8)
PROT SERPL-MCNC: 8.3 G/DL (ref 6.8–8.8)
PROT UR-MCNC: 0.29 G/L
PROT/CREAT 24H UR: 0.29 G/G CR (ref 0–0.2)
RBC # BLD AUTO: 1.89 10E12/L (ref 4.4–5.9)
RBC # BLD AUTO: 2.22 10E12/L (ref 4.4–5.9)
SODIUM SERPL-SCNC: 141 MMOL/L (ref 133–144)
SODIUM SERPL-SCNC: 141 MMOL/L (ref 133–144)
SPECIMEN EXP DATE BLD: NORMAL
TACROLIMUS BLD-MCNC: 6.5 UG/L (ref 5–15)
TME LAST DOSE: NORMAL H
WBC # BLD AUTO: 3.4 10E9/L (ref 4–11)
WBC # BLD AUTO: 4 10E9/L (ref 4–11)

## 2020-03-02 PROCEDURE — 86901 BLOOD TYPING SEROLOGIC RH(D): CPT | Performed by: INTERNAL MEDICINE

## 2020-03-02 PROCEDURE — G0463 HOSPITAL OUTPT CLINIC VISIT: HCPCS | Mod: ZF

## 2020-03-02 PROCEDURE — 82248 BILIRUBIN DIRECT: CPT | Performed by: INTERNAL MEDICINE

## 2020-03-02 PROCEDURE — 84155 ASSAY OF PROTEIN SERUM: CPT | Mod: 91 | Performed by: INTERNAL MEDICINE

## 2020-03-02 PROCEDURE — 86850 RBC ANTIBODY SCREEN: CPT | Performed by: INTERNAL MEDICINE

## 2020-03-02 PROCEDURE — 84450 TRANSFERASE (AST) (SGOT): CPT | Mod: 91 | Performed by: INTERNAL MEDICINE

## 2020-03-02 PROCEDURE — 83735 ASSAY OF MAGNESIUM: CPT | Performed by: INTERNAL MEDICINE

## 2020-03-02 PROCEDURE — 84156 ASSAY OF PROTEIN URINE: CPT | Performed by: INTERNAL MEDICINE

## 2020-03-02 PROCEDURE — 86923 COMPATIBILITY TEST ELECTRIC: CPT | Performed by: INTERNAL MEDICINE

## 2020-03-02 PROCEDURE — 82043 UR ALBUMIN QUANTITATIVE: CPT | Performed by: INTERNAL MEDICINE

## 2020-03-02 PROCEDURE — 83735 ASSAY OF MAGNESIUM: CPT | Mod: 91 | Performed by: SURGERY

## 2020-03-02 PROCEDURE — 84100 ASSAY OF PHOSPHORUS: CPT | Mod: 91 | Performed by: SURGERY

## 2020-03-02 PROCEDURE — 36415 COLL VENOUS BLD VENIPUNCTURE: CPT | Performed by: INTERNAL MEDICINE

## 2020-03-02 PROCEDURE — 80076 HEPATIC FUNCTION PANEL: CPT | Performed by: SURGERY

## 2020-03-02 PROCEDURE — 80048 BASIC METABOLIC PNL TOTAL CA: CPT | Performed by: SURGERY

## 2020-03-02 PROCEDURE — 80197 ASSAY OF TACROLIMUS: CPT | Performed by: SURGERY

## 2020-03-02 PROCEDURE — 86900 BLOOD TYPING SEROLOGIC ABO: CPT | Performed by: INTERNAL MEDICINE

## 2020-03-02 PROCEDURE — 85027 COMPLETE CBC AUTOMATED: CPT | Mod: 91 | Performed by: INTERNAL MEDICINE

## 2020-03-02 PROCEDURE — 80069 RENAL FUNCTION PANEL: CPT | Performed by: INTERNAL MEDICINE

## 2020-03-02 PROCEDURE — 85027 COMPLETE CBC AUTOMATED: CPT | Performed by: SURGERY

## 2020-03-02 PROCEDURE — 82247 BILIRUBIN TOTAL: CPT | Performed by: INTERNAL MEDICINE

## 2020-03-02 PROCEDURE — 84460 ALANINE AMINO (ALT) (SGPT): CPT | Mod: 91 | Performed by: INTERNAL MEDICINE

## 2020-03-02 PROCEDURE — 84075 ASSAY ALKALINE PHOSPHATASE: CPT | Mod: 91 | Performed by: INTERNAL MEDICINE

## 2020-03-02 RX ORDER — HEPARIN SODIUM,PORCINE 10 UNIT/ML
5 VIAL (ML) INTRAVENOUS
Status: CANCELLED | OUTPATIENT
Start: 2020-03-02

## 2020-03-02 RX ORDER — HEPARIN SODIUM (PORCINE) LOCK FLUSH IV SOLN 100 UNIT/ML 100 UNIT/ML
5 SOLUTION INTRAVENOUS
Status: CANCELLED | OUTPATIENT
Start: 2020-03-02

## 2020-03-02 ASSESSMENT — PAIN SCALES - GENERAL: PAINLEVEL: MODERATE PAIN (5)

## 2020-03-02 ASSESSMENT — MIFFLIN-ST. JEOR: SCORE: 1567.91

## 2020-03-02 NOTE — PROGRESS NOTES
Patient seen in clinic to see Dr. Rao for follow up today.  Hgb 6.6, per Dr. Rao recommend blood transfusion, therapy plan already in place.  ABO/type and cross added on and in process.  Scheduled for 1 unit of PRBCs for tomorrow at 1 pm.  Patient is aware.  Lupe Owens LPN  Nephrology  [phone]

## 2020-03-02 NOTE — LETTER
3/2/2020      RE: Frandy Workman  [address]  [facility] [address]         Nephrology Clinic    Eloy Rao MD  2020     Name: Frandy Workman  MRN: [id number]  Age: 55 year old  : 1965  Referring provider: Natalie Russell     Assessment and Plan:  1. CKD, stage 3: Prior to recent liver transplant baseline Cr ~1.1 mg/dL with eGFR of 75 ml/min. Post-transplant creatinine is now ~1.5 - 1.7 mg/dL (eGFR of 45 ml/min).  I suspect he likely has some degree of diabetic changes though not albuminuric further complicated by chronic changes from past lithium use (for 15 years) and now primarily drive by tacrolimus toxicity.  He is at risk of progressive CKD due to tacrolimus and diabetes as well.      -would favor lower tacrolimus levels as able and potentially changing to another immunosuppressive in the near future  -would prefer higher blood pressures to allow for renal perfusion.  Not currently on antihypertensivves  -no pressing need to start RAAS blockade with ACEi/ARB therapy anytime soon      2. HTN/Volume status: Hypotensive (/66) in clinic today.  Hypovolemic on exam.    -would favor high blood pressures to assure adequate renal perfusion   -Not on antihypertensives     3.  Electrolytes:  Potassium on higher side.  Suspect multifactorial in nature and due to CKD, hyperglycemia, tacrolimus and potentially Bactrim.      4. Anemia: Possibly related to Imuran, which has been recently discontinued.  Iron stores are replete.  Anemia of chronic disease and renal insufficiency also likely contributing.    - He has standing orders for transfusion once his Hgb falls below 7, as it is today.  He will undergo a transfusion tomorrow.    - Referral to anemia management clinic with the goal of starting epogen therapy    Follow-up: Return in about 3 months (around 2020).     Reason For Visit:   CKD    HPI:   Frandy Workman is a 55 year old male who presents for CKD f/u.  His  past medical history is remarkable for liver transplant (November, 2019) for ESTRADA cirrhosis (complicated by past ascites and hepatic encephalopathy on lactulose and rifaximin in the past), hepatocellular carcinoma (s/p TACE and microwave ablation 01/2019), long standing DM 2 (complicated by nonproliferative diabetic retinopathy, macular edema and peripheral neuropathy), bipolar disorder (previously on lithium for 15 years), HTN, hyperlipidemia and CAD by angiography not requiring PCI.      He denies excessive use of NSAIDs.  He had no history of nephrolithiasis or frequent UTIs.  He has no significant family history of CKD.     In terms of CKD, he appears to have a baseline of ~1.1 mg/dL prior to his liver transplant in November 2019.  Creatinine after transplant was ~1.3 mg/dL at time of discharge and vikram to 3.2 mg/dL at time of his last appointment 12/2/2019.  This was thought to be prerenal from volume depletion.  He was admitted for IVF and creatinine improved to 1.5 mg/dL at tme of discharge.    He is feeling better than he was at time of last appointment.  He has been having issues with his blood counts and has required 5 units of RBCs in the past few months.  He has been more fatigued than usual.  Dr. Banuelos did take him off Imuran about 2 weeks ago.  He remains on tacrolimus only.  He denies any blood in his stool.    His appetite has improved and he is back to eating 3 meals a day.  No vomiting in the past 2 weeks, even off of Reglan.  He is having regular bowel movements.     Review of Systems:   Pertinent items are noted in HPI or as below, remainder of complete ROS is negative.      Active Medications:      Acetaminophen (TYLENOL) 325 MG CAPS, Take 325-650 mg by mouth every 4 hours as needed (pain, fever), Disp: 100 capsule, Rfl: 3     alpha-lipoic acid 600 MG capsule, Take 1 capsule (600 mg) by mouth daily, Disp: 90 capsule, Rfl: 3     Artificial Tear Solution (SM ARTIFICIAL TEARS) SOLN, Place 1 drop into  the right eye every hour as needed (dry eyes) Apply at least 4 times daily and as needed for dry eye, Disp: 1 Bottle, Rfl: 3     aspirin 81 MG EC tablet, Take 1 tablet (81 mg) by mouth daily, Disp: , Rfl:      econazole nitrate 1 % external cream, Apply topically daily To feet and toenails., Disp: 85 g, Rfl: 0     ferrous sulfate (FEROSUL) 325 (65 Fe) MG tablet, Take 1 tablet (325 mg) by mouth daily (with breakfast), Disp: 90 tablet, Rfl: 3     Glucagon 3 MG/DOSE POWD, Spray 1 Dose in nostril as needed (for low blood sugar with sedation or emesis (unable to ingest glucose)), Disp: 1 each, Rfl: 1     glucose (BD GLUCOSE) 4 g chewable tablet, Take 1 tablet by mouth every hour as needed for low blood sugar, Disp: 60 tablet, Rfl: 1     insulin aspart (NOVOLOG PEN) 100 UNIT/ML pen, Inject 1 unit : 8 grams carb + sliding scale 4 times daily Max units per day   80 units daily ., Disp: 75 mL, Rfl: 3     insulin aspart (NOVOLOG PEN) 100 UNIT/ML pen, Give with meals and snacks Do Not give Correction Insulin if Pre-Meal BG less than 140.  For Pre-Meal  - 189 give 1 unit.  For Pre-Meal  - 239 give 2 units.  For Pre-Meal  - 289 give 3 units.  For Pre-Meal  - 339 give 4 units.  For Pre-Meal - 399 give 5 units.  For Pre-Meal -449 give 6 units For Pre-Meal BG greater than or equal to 450 give 7 units.  To be given with prandial insulin, and based on pre-meal blood glucose.   If given at mealtime, administer within 30 minutes of start of meal, Disp: 3 mL, Rfl: 1     insulin glargine (BASAGLAR KWIKPEN) 100 UNIT/ML pen, Inject 28 Units Subcutaneous daily, Disp: 30 mL, Rfl: 3     lamiVUDine (EPIVIR) 100 MG tablet, Take 1 tablet (100 mg) by mouth daily, Disp: 30 tablet, Rfl: 3     magnesium oxide (MAG-OX) 400 MG tablet, Take 1 tablet (400 mg) by mouth daily, Disp: 60 tablet, Rfl: 0     melatonin 5 MG tablet, Take 5 mg by mouth nightly as needed for sleep Take at 6 pm every night, Disp: , Rfl:       Multiple Vitamin (THERAVITE PO), Take 1 tablet by mouth every morning , Disp: , Rfl:      omeprazole (PRILOSEC) 40 MG DR capsule, Take 1 capsule (40 mg) by mouth daily, Disp: 90 capsule, Rfl: 2     rosuvastatin (CRESTOR) 5 MG tablet, Take 1 tablet (5 mg) by mouth daily, Disp: 30 tablet, Rfl: 3     sertraline (ZOLOFT) 50 MG tablet, Take 1 tablet (50 mg) by mouth daily, Disp: 90 tablet, Rfl: 0     sulfamethoxazole-trimethoprim (BACTRIM/SEPTRA) 400-80 MG tablet, Take 1 tablet by mouth twice a week Monday and Thursday only, Disp: 30 tablet, Rfl: 11     tacrolimus (GENERIC EQUIVALENT) 1 MG capsule, Take 2 capsules (2 mg) by mouth every morning AND 1 capsule (1 mg) every evening., Disp: 90 capsule, Rfl: 0     tamsulosin (FLOMAX) 0.4 MG capsule, TAKE 1 CAPSULE(0.4 MG) BY MOUTH DAILY, Disp: 90 capsule, Rfl: 3     valGANciclovir (VALCYTE) 450 MG tablet, Take 1 tablet (450 mg) by mouth every other day, Disp: 15 tablet, Rfl: 1     vitamin B-12 (CYANOCOBALAMIN) 1000 MCG tablet, Take 1 tablet (1,000 mcg) by mouth daily, Disp: 30 tablet, Rfl: 0     vitamin D3 (CHOLECALCIFEROL) 2000 units (50 mcg) tablet, Take 1 tablet (2,000 Units) by mouth daily, Disp: 30 tablet, Rfl: 0     Allergies:   Codeine and Seasonal allergies      Past Medical History:  Chronic kidney disease, stage 3  Type 2 diabetes mellitus  Bipolar affective disorder   Brow ptosis  Hepatocellular carcinoma  Coronary artery disease   Hypertension   Anemia   Anxiety   Mild recurrent major depression  Mild nonproliferative retinopathy  Gastroesophageal reflux disease   Cholelithiasis   Benign prostatic hypertrophy   Portal vein thrombosis      Past Surgical History:  Liver transplant recipient   Coronary catheterization  Parotidectomy, radical neck dissection  Right eyelid weight placement  Orthopedic surgery    Family History:   Prostate cancer - maternal grandfather, father   Substance abuse - maternal grandfather, maternal grandmother   Colon cancer - father,  "paternal grandmother  Cancer - mother  Thyroid disease - mother, sister   Stroke - mother  Depression - mother, sister  Asthma - sister      Social History:   Presents to clinic alone  Tobacco Use: Former smoker, 180 pack year history, quit 2017.   Alcohol Use: quit September 1996  PCP: Maximo Tucker      Physical Exam:  /62   Pulse 98   Temp 97.5  F (36.4  C) (Oral)   Ht 1.753 m (5' 9\")   Wt 74.8 kg (164 lb 12.8 oz)   SpO2 100%   BMI 24.34 kg/m       GA: anxious but in NAD  HEENT: no scleral icterus, no cervical LAD  CV: regular, no rub, no JVD, no edema  PULM: Lungs CTA B  GI: abd soft, NT, ND  : no CVA tenderness  MSK: no joint effusions  SKIN: no concerning rash  NEURO: no gross focal deficit    Laboratory:  CMP  Recent Labs   Lab Test 03/02/20  1012 03/02/20  0813 02/24/20  0853 02/18/20  0810    141 140 142   POTASSIUM 5.6* 5.0 4.7 5.1   CHLORIDE 113* 112* 114* 112*   CO2 21 21 20 24   ANIONGAP 6 8 6 6   * 170* 125* 142*   BUN 56* 54* 43* 45*   CR 1.92* 1.91* 1.67* 1.58*   GFRESTIMATED 38* 38* 45* 48*   GFRESTBLACK 44* 44* 52* 56*   DEBORAH 9.5 9.5 9.2 9.0   MAG 2.1 2.3 2.1 1.9   PHOS 4.0 4.3 3.7 3.5   PROTTOTAL 7.6 8.3 7.6 7.2   ALBUMIN 4.0 4.2 4.1 4.1   BILITOTAL 0.4 0.5 0.6 0.7   ALKPHOS 146 157* 129 113   AST 15 13 19 14   ALT 21 25 23 26     CBC  Recent Labs   Lab Test 03/02/20  1012 03/02/20  0813 02/24/20  0853 02/18/20  0810   HGB 6.6* 7.6* 7.1* 7.5*   WBC 3.4* 4.0 2.6* 1.5*   RBC 1.89* 2.22* 2.09* 2.27*   HCT 20.2* 22.6* 21.1* 22.2*   * 102* 101* 98   MCH 34.9* 34.2* 34.0* 33.0   MCHC 32.7 33.6 33.6 33.8   RDW Dimorphic population - unable to calculate Dimorphic population - unable to calculate 25.2* 23.8*   PLT 75* 91* 86* 95*     INR  Recent Labs   Lab Test 01/24/20  0837 12/05/19  0824 11/16/19  0648 11/15/19  0449  11/12/19  1400  11/12/19  0346  11/11/19  1651 11/11/19  1600   INR 1.09 1.14 1.11 1.12   < > 1.46*   < > 1.47*   < >  --  1.60*   PTT  --   --   --   -- "   --  39*  --  57*  --  48* 48*    < > = values in this interval not displayed.     ABG  Recent Labs   Lab Test 11/12/19  1513 11/12/19  1400 11/11/19  1735 11/11/19  1651 11/11/19  1600   PH 7.37 7.36  --  7.41 7.48*   PCO2 42 43  --  42 36   PO2 114* 92  --  81 98   HCO3 24 24  --  27 26   O2PER 44% 41% 30 32 32.0      URINE STUDIES  Recent Labs   Lab Test 12/04/19  1400 11/15/19  1123 07/08/19  1017 02/04/19  0705   COLOR Light Yellow Light Yellow Yellow Yellow   APPEARANCE Clear Clear Clear Clear   URINEGLC 30* 300* >499* 150*   URINEBILI Negative Negative Negative Negative   URINEKETONE Negative Negative Negative Negative   SG 1.009 1.007 1.017 1.016   UBLD Negative Small* Negative Negative   URINEPH 5.0 6.5 5.0 5.0   PROTEIN 30* Negative Negative Negative   NITRITE Negative Negative Negative Negative   LEUKEST Negative Negative Negative Negative   RBCU 0 0 <1 1   WBCU 1 <1 3 1     Recent Labs   Lab Test 03/02/20  1013 12/02/19  1040 07/08/19  1017 02/04/19  0705   UTPG 0.29* 1.22* 0.11 0.14     PTH  Recent Labs   Lab Test 07/08/19  1020   PTHI 54     IRON STUDIES   Recent Labs   Lab Test 01/27/20  1340 12/02/19  1034 12/28/18  1108 09/15/18  1215 06/09/17  1250   IRON  --  88  --  52  --    FEB  --  248  --  403  --    IRONSAT  --  36  --  13*  --    QUAN 690* 1,353* 90 10* 450*       Scribe Disclosure:   I, Rani Barclay, am serving as a scribe to document services personally performed by Eloy Rao MD at this visit, based upon the provider's statements to me. All documentation has been reviewed by the aforementioned provider prior to being entered into the official medical record.     Total time spent was >40minutes, and more than 50% of face to face time was spent in counseling and/or coordination of care regarding principles of multidisciplinary care, medication management, and chronic kidney disease education.  MD Eloy Nelson MD

## 2020-03-02 NOTE — PROGRESS NOTES
Nephrology Clinic    Eloy Rao MD  2020     Name: Frandy Workman  MRN: [id number]  Age: 55 year old  : 1965  Referring provider: Natalie Russell     Assessment and Plan:  1. CKD, stage 3: Prior to recent liver transplant baseline Cr ~1.1 mg/dL with eGFR of 75 ml/min. Post-transplant creatinine is now ~1.5 - 1.7 mg/dL (eGFR of 45 ml/min).  I suspect he likely has some degree of diabetic changes though not albuminuric further complicated by chronic changes from past lithium use (for 15 years) and now primarily drive by tacrolimus toxicity.  He is at risk of progressive CKD due to tacrolimus and diabetes as well.      -would favor lower tacrolimus levels as able and potentially changing to another immunosuppressive in the near future  -would prefer higher blood pressures to allow for renal perfusion.  Not currently on antihypertensivves  -no pressing need to start RAAS blockade with ACEi/ARB therapy anytime soon      2. HTN/Volume status: Hypotensive (/66) in clinic today.  Hypovolemic on exam.    -would favor high blood pressures to assure adequate renal perfusion   -Not on antihypertensives     3.  Electrolytes:  Potassium on higher side.  Suspect multifactorial in nature and due to CKD, hyperglycemia, tacrolimus and potentially Bactrim.      4. Anemia: Possibly related to Imuran, which has been recently discontinued.  Iron stores are replete.  Anemia of chronic disease and renal insufficiency also likely contributing.    - He has standing orders for transfusion once his Hgb falls below 7, as it is today.  He will undergo a transfusion tomorrow.    - Referral to anemia management clinic with the goal of starting epogen therapy    Follow-up: Return in about 3 months (around 2020).     Reason For Visit:   CKD    HPI:   Frandy Workman is a 55 year old male who presents for CKD f/u.  His past medical history is remarkable for liver transplant () for  ESTRADA cirrhosis (complicated by past ascites and hepatic encephalopathy on lactulose and rifaximin in the past), hepatocellular carcinoma (s/p TACE and microwave ablation 01/2019), long standing DM 2 (complicated by nonproliferative diabetic retinopathy, macular edema and peripheral neuropathy), bipolar disorder (previously on lithium for 15 years), HTN, hyperlipidemia and CAD by angiography not requiring PCI.      He denies excessive use of NSAIDs.  He had no history of nephrolithiasis or frequent UTIs.  He has no significant family history of CKD.     In terms of CKD, he appears to have a baseline of ~1.1 mg/dL prior to his liver transplant in November 2019.  Creatinine after transplant was ~1.3 mg/dL at time of discharge and vikram to 3.2 mg/dL at time of his last appointment 12/2/2019.  This was thought to be prerenal from volume depletion.  He was admitted for IVF and creatinine improved to 1.5 mg/dL at tme of discharge.    He is feeling better than he was at time of last appointment.  He has been having issues with his blood counts and has required 5 units of RBCs in the past few months.  He has been more fatigued than usual.  Dr. Banuelos did take him off Imuran about 2 weeks ago.  He remains on tacrolimus only.  He denies any blood in his stool.    His appetite has improved and he is back to eating 3 meals a day.  No vomiting in the past 2 weeks, even off of Reglan.  He is having regular bowel movements.     Review of Systems:   Pertinent items are noted in HPI or as below, remainder of complete ROS is negative.      Active Medications:      Acetaminophen (TYLENOL) 325 MG CAPS, Take 325-650 mg by mouth every 4 hours as needed (pain, fever), Disp: 100 capsule, Rfl: 3     alpha-lipoic acid 600 MG capsule, Take 1 capsule (600 mg) by mouth daily, Disp: 90 capsule, Rfl: 3     Artificial Tear Solution (SM ARTIFICIAL TEARS) SOLN, Place 1 drop into the right eye every hour as needed (dry eyes) Apply at least 4 times daily  and as needed for dry eye, Disp: 1 Bottle, Rfl: 3     aspirin 81 MG EC tablet, Take 1 tablet (81 mg) by mouth daily, Disp: , Rfl:      econazole nitrate 1 % external cream, Apply topically daily To feet and toenails., Disp: 85 g, Rfl: 0     ferrous sulfate (FEROSUL) 325 (65 Fe) MG tablet, Take 1 tablet (325 mg) by mouth daily (with breakfast), Disp: 90 tablet, Rfl: 3     Glucagon 3 MG/DOSE POWD, Spray 1 Dose in nostril as needed (for low blood sugar with sedation or emesis (unable to ingest glucose)), Disp: 1 each, Rfl: 1     glucose (BD GLUCOSE) 4 g chewable tablet, Take 1 tablet by mouth every hour as needed for low blood sugar, Disp: 60 tablet, Rfl: 1     insulin aspart (NOVOLOG PEN) 100 UNIT/ML pen, Inject 1 unit : 8 grams carb + sliding scale 4 times daily Max units per day   80 units daily ., Disp: 75 mL, Rfl: 3     insulin aspart (NOVOLOG PEN) 100 UNIT/ML pen, Give with meals and snacks Do Not give Correction Insulin if Pre-Meal BG less than 140.  For Pre-Meal  - 189 give 1 unit.  For Pre-Meal  - 239 give 2 units.  For Pre-Meal  - 289 give 3 units.  For Pre-Meal  - 339 give 4 units.  For Pre-Meal - 399 give 5 units.  For Pre-Meal -449 give 6 units For Pre-Meal BG greater than or equal to 450 give 7 units.  To be given with prandial insulin, and based on pre-meal blood glucose.   If given at mealtime, administer within 30 minutes of start of meal, Disp: 3 mL, Rfl: 1     insulin glargine (BASAGLAR KWIKPEN) 100 UNIT/ML pen, Inject 28 Units Subcutaneous daily, Disp: 30 mL, Rfl: 3     lamiVUDine (EPIVIR) 100 MG tablet, Take 1 tablet (100 mg) by mouth daily, Disp: 30 tablet, Rfl: 3     magnesium oxide (MAG-OX) 400 MG tablet, Take 1 tablet (400 mg) by mouth daily, Disp: 60 tablet, Rfl: 0     melatonin 5 MG tablet, Take 5 mg by mouth nightly as needed for sleep Take at 6 pm every night, Disp: , Rfl:      Multiple Vitamin (THERAVITE PO), Take 1 tablet by mouth every morning , Disp:  , Rfl:      omeprazole (PRILOSEC) 40 MG DR capsule, Take 1 capsule (40 mg) by mouth daily, Disp: 90 capsule, Rfl: 2     rosuvastatin (CRESTOR) 5 MG tablet, Take 1 tablet (5 mg) by mouth daily, Disp: 30 tablet, Rfl: 3     sertraline (ZOLOFT) 50 MG tablet, Take 1 tablet (50 mg) by mouth daily, Disp: 90 tablet, Rfl: 0     sulfamethoxazole-trimethoprim (BACTRIM/SEPTRA) 400-80 MG tablet, Take 1 tablet by mouth twice a week Monday and Thursday only, Disp: 30 tablet, Rfl: 11     tacrolimus (GENERIC EQUIVALENT) 1 MG capsule, Take 2 capsules (2 mg) by mouth every morning AND 1 capsule (1 mg) every evening., Disp: 90 capsule, Rfl: 0     tamsulosin (FLOMAX) 0.4 MG capsule, TAKE 1 CAPSULE(0.4 MG) BY MOUTH DAILY, Disp: 90 capsule, Rfl: 3     valGANciclovir (VALCYTE) 450 MG tablet, Take 1 tablet (450 mg) by mouth every other day, Disp: 15 tablet, Rfl: 1     vitamin B-12 (CYANOCOBALAMIN) 1000 MCG tablet, Take 1 tablet (1,000 mcg) by mouth daily, Disp: 30 tablet, Rfl: 0     vitamin D3 (CHOLECALCIFEROL) 2000 units (50 mcg) tablet, Take 1 tablet (2,000 Units) by mouth daily, Disp: 30 tablet, Rfl: 0     Allergies:   Codeine and Seasonal allergies      Past Medical History:  Chronic kidney disease, stage 3  Type 2 diabetes mellitus  Bipolar affective disorder   Brow ptosis  Hepatocellular carcinoma  Coronary artery disease   Hypertension   Anemia   Anxiety   Mild recurrent major depression  Mild nonproliferative retinopathy  Gastroesophageal reflux disease   Cholelithiasis   Benign prostatic hypertrophy   Portal vein thrombosis      Past Surgical History:  Liver transplant recipient   Coronary catheterization  Parotidectomy, radical neck dissection  Right eyelid weight placement  Orthopedic surgery    Family History:   Prostate cancer - maternal grandfather, father   Substance abuse - maternal grandfather, maternal grandmother   Colon cancer - father, paternal grandmother  Cancer - mother  Thyroid disease - mother, sister   Stroke -  "mother  Depression - mother, sister  Asthma - sister      Social History:   Presents to clinic alone  Tobacco Use: Former smoker, 180 pack year history, quit 2017.   Alcohol Use: quit September 1996  PCP: Maximo Tucker      Physical Exam:  /62   Pulse 98   Temp 97.5  F (36.4  C) (Oral)   Ht 1.753 m (5' 9\")   Wt 74.8 kg (164 lb 12.8 oz)   SpO2 100%   BMI 24.34 kg/m       GA: anxious but in NAD  HEENT: no scleral icterus, no cervical LAD  CV: regular, no rub, no JVD, no edema  PULM: Lungs CTA B  GI: abd soft, NT, ND  : no CVA tenderness  MSK: no joint effusions  SKIN: no concerning rash  NEURO: no gross focal deficit    Laboratory:  CMP  Recent Labs   Lab Test 03/02/20  1012 03/02/20  0813 02/24/20  0853 02/18/20  0810    141 140 142   POTASSIUM 5.6* 5.0 4.7 5.1   CHLORIDE 113* 112* 114* 112*   CO2 21 21 20 24   ANIONGAP 6 8 6 6   * 170* 125* 142*   BUN 56* 54* 43* 45*   CR 1.92* 1.91* 1.67* 1.58*   GFRESTIMATED 38* 38* 45* 48*   GFRESTBLACK 44* 44* 52* 56*   DEBORAH 9.5 9.5 9.2 9.0   MAG 2.1 2.3 2.1 1.9   PHOS 4.0 4.3 3.7 3.5   PROTTOTAL 7.6 8.3 7.6 7.2   ALBUMIN 4.0 4.2 4.1 4.1   BILITOTAL 0.4 0.5 0.6 0.7   ALKPHOS 146 157* 129 113   AST 15 13 19 14   ALT 21 25 23 26     CBC  Recent Labs   Lab Test 03/02/20  1012 03/02/20  0813 02/24/20  0853 02/18/20  0810   HGB 6.6* 7.6* 7.1* 7.5*   WBC 3.4* 4.0 2.6* 1.5*   RBC 1.89* 2.22* 2.09* 2.27*   HCT 20.2* 22.6* 21.1* 22.2*   * 102* 101* 98   MCH 34.9* 34.2* 34.0* 33.0   MCHC 32.7 33.6 33.6 33.8   RDW Dimorphic population - unable to calculate Dimorphic population - unable to calculate 25.2* 23.8*   PLT 75* 91* 86* 95*     INR  Recent Labs   Lab Test 01/24/20  0837 12/05/19  0824 11/16/19  0648 11/15/19  0449  11/12/19  1400  11/12/19  0346  11/11/19  1651 11/11/19  1600   INR 1.09 1.14 1.11 1.12   < > 1.46*   < > 1.47*   < >  --  1.60*   PTT  --   --   --   --   --  39*  --  57*  --  48* 48*    < > = values in this interval not displayed. "     ABG  Recent Labs   Lab Test 11/12/19  1513 11/12/19  1400 11/11/19  1735 11/11/19  1651 11/11/19  1600   PH 7.37 7.36  --  7.41 7.48*   PCO2 42 43  --  42 36   PO2 114* 92  --  81 98   HCO3 24 24  --  27 26   O2PER 44% 41% 30 32 32.0      URINE STUDIES  Recent Labs   Lab Test 12/04/19  1400 11/15/19  1123 07/08/19  1017 02/04/19  0705   COLOR Light Yellow Light Yellow Yellow Yellow   APPEARANCE Clear Clear Clear Clear   URINEGLC 30* 300* >499* 150*   URINEBILI Negative Negative Negative Negative   URINEKETONE Negative Negative Negative Negative   SG 1.009 1.007 1.017 1.016   UBLD Negative Small* Negative Negative   URINEPH 5.0 6.5 5.0 5.0   PROTEIN 30* Negative Negative Negative   NITRITE Negative Negative Negative Negative   LEUKEST Negative Negative Negative Negative   RBCU 0 0 <1 1   WBCU 1 <1 3 1     Recent Labs   Lab Test 03/02/20  1013 12/02/19  1040 07/08/19  1017 02/04/19  0705   UTPG 0.29* 1.22* 0.11 0.14     PTH  Recent Labs   Lab Test 07/08/19  1020   PTHI 54     IRON STUDIES   Recent Labs   Lab Test 01/27/20  1340 12/02/19  1034 12/28/18  1108 09/15/18  1215 06/09/17  1250   IRON  --  88  --  52  --    FEB  --  248  --  403  --    IRONSAT  --  36  --  13*  --    QUAN 690* 1,353* 90 10* 450*       Scribe Disclosure:   I, Rani Barclay, am serving as a scribe to document services personally performed by Eloy Rao MD at this visit, based upon the provider's statements to me. All documentation has been reviewed by the aforementioned provider prior to being entered into the official medical record.     Total time spent was >40minutes, and more than 50% of face to face time was spent in counseling and/or coordination of care regarding principles of multidisciplinary care, medication management, and chronic kidney disease education.  Eloy Rao MD

## 2020-03-02 NOTE — LETTER
3/2/2020       RE: Frandy Workman  [address]  [facility] [address]     Dear Colleague,    Thank you for referring your patient, Frandy Workman, to the [facility] NEPHROLOGY at [facility]. Please see a copy of my visit note below.      Nephrology Clinic    Eloy Rao MD  2020     Name: Frandy Workman  MRN: [id number]  Age: 55 year old  : 1965  Referring provider: Natalie Russell     Assessment and Plan:  1. CKD, stage 3: Prior to recent liver transplant baseline Cr ~1.1 mg/dL with eGFR of 75 ml/min. Post-transplant creatinine is now ~1.5 - 1.7 mg/dL (eGFR of 45 ml/min).  I suspect he likely has some degree of diabetic changes though not albuminuric further complicated by chronic changes from past lithium use (for 15 years) and now primarily drive by tacrolimus toxicity.  He is at risk of progressive CKD due to tacrolimus and diabetes as well.      -would favor lower tacrolimus levels as able and potentially changing to another immunosuppressive in the near future  -would prefer higher blood pressures to allow for renal perfusion.  Not currently on antihypertensivves  -no pressing need to start RAAS blockade with ACEi/ARB therapy anytime soon      2. HTN/Volume status: Hypotensive (/66) in clinic today.  Hypovolemic on exam.    -would favor high blood pressures to assure adequate renal perfusion   -Not on antihypertensives     3.  Electrolytes:  Potassium on higher side.  Suspect multifactorial in nature and due to CKD, hyperglycemia, tacrolimus and potentially Bactrim.      4. Anemia: Possibly related to Imuran, which has been recently discontinued.  Iron stores are replete.  Anemia of chronic disease and renal insufficiency also likely contributing.    - He has standing orders for transfusion once his Hgb falls below 7, as it is today.  He will undergo a transfusion tomorrow.    - Referral to anemia management clinic with the  goal of starting epogen therapy    Follow-up: Return in about 3 months (around 6/2/2020).     Reason For Visit:   CKD    HPI:   Frandy Workman is a 55 year old male who presents for CKD f/u.  His past medical history is remarkable for liver transplant (November, 2019) for ESTRADA cirrhosis (complicated by past ascites and hepatic encephalopathy on lactulose and rifaximin in the past), hepatocellular carcinoma (s/p TACE and microwave ablation 01/2019), long standing DM 2 (complicated by nonproliferative diabetic retinopathy, macular edema and peripheral neuropathy), bipolar disorder (previously on lithium for 15 years), HTN, hyperlipidemia and CAD by angiography not requiring PCI.      He denies excessive use of NSAIDs.  He had no history of nephrolithiasis or frequent UTIs.  He has no significant family history of CKD.     In terms of CKD, he appears to have a baseline of ~1.1 mg/dL prior to his liver transplant in November 2019.  Creatinine after transplant was ~1.3 mg/dL at time of discharge and vikram to 3.2 mg/dL at time of his last appointment 12/2/2019.  This was thought to be prerenal from volume depletion.  He was admitted for IVF and creatinine improved to 1.5 mg/dL at tme of discharge.    He is feeling better than he was at time of last appointment.  He has been having issues with his blood counts and has required 5 units of RBCs in the past few months.  He has been more fatigued than usual.  Dr. Banuelos did take him off Imuran about 2 weeks ago.  He remains on tacrolimus only.  He denies any blood in his stool.    His appetite has improved and he is back to eating 3 meals a day.  No vomiting in the past 2 weeks, even off of Reglan.  He is having regular bowel movements.     Review of Systems:   Pertinent items are noted in HPI or as below, remainder of complete ROS is negative.      Active Medications:      Acetaminophen (TYLENOL) 325 MG CAPS, Take 325-650 mg by mouth every 4 hours as needed (pain, fever), Disp:  100 capsule, Rfl: 3     alpha-lipoic acid 600 MG capsule, Take 1 capsule (600 mg) by mouth daily, Disp: 90 capsule, Rfl: 3     Artificial Tear Solution (SM ARTIFICIAL TEARS) SOLN, Place 1 drop into the right eye every hour as needed (dry eyes) Apply at least 4 times daily and as needed for dry eye, Disp: 1 Bottle, Rfl: 3     aspirin 81 MG EC tablet, Take 1 tablet (81 mg) by mouth daily, Disp: , Rfl:      econazole nitrate 1 % external cream, Apply topically daily To feet and toenails., Disp: 85 g, Rfl: 0     ferrous sulfate (FEROSUL) 325 (65 Fe) MG tablet, Take 1 tablet (325 mg) by mouth daily (with breakfast), Disp: 90 tablet, Rfl: 3     Glucagon 3 MG/DOSE POWD, Spray 1 Dose in nostril as needed (for low blood sugar with sedation or emesis (unable to ingest glucose)), Disp: 1 each, Rfl: 1     glucose (BD GLUCOSE) 4 g chewable tablet, Take 1 tablet by mouth every hour as needed for low blood sugar, Disp: 60 tablet, Rfl: 1     insulin aspart (NOVOLOG PEN) 100 UNIT/ML pen, Inject 1 unit : 8 grams carb + sliding scale 4 times daily Max units per day   80 units daily ., Disp: 75 mL, Rfl: 3     insulin aspart (NOVOLOG PEN) 100 UNIT/ML pen, Give with meals and snacks Do Not give Correction Insulin if Pre-Meal BG less than 140.  For Pre-Meal  - 189 give 1 unit.  For Pre-Meal  - 239 give 2 units.  For Pre-Meal  - 289 give 3 units.  For Pre-Meal  - 339 give 4 units.  For Pre-Meal - 399 give 5 units.  For Pre-Meal -449 give 6 units For Pre-Meal BG greater than or equal to 450 give 7 units.  To be given with prandial insulin, and based on pre-meal blood glucose.   If given at mealtime, administer within 30 minutes of start of meal, Disp: 3 mL, Rfl: 1     insulin glargine (BASAGLAR KWIKPEN) 100 UNIT/ML pen, Inject 28 Units Subcutaneous daily, Disp: 30 mL, Rfl: 3     lamiVUDine (EPIVIR) 100 MG tablet, Take 1 tablet (100 mg) by mouth daily, Disp: 30 tablet, Rfl: 3     magnesium oxide (MAG-OX)  400 MG tablet, Take 1 tablet (400 mg) by mouth daily, Disp: 60 tablet, Rfl: 0     melatonin 5 MG tablet, Take 5 mg by mouth nightly as needed for sleep Take at 6 pm every night, Disp: , Rfl:      Multiple Vitamin (THERAVITE PO), Take 1 tablet by mouth every morning , Disp: , Rfl:      omeprazole (PRILOSEC) 40 MG DR capsule, Take 1 capsule (40 mg) by mouth daily, Disp: 90 capsule, Rfl: 2     rosuvastatin (CRESTOR) 5 MG tablet, Take 1 tablet (5 mg) by mouth daily, Disp: 30 tablet, Rfl: 3     sertraline (ZOLOFT) 50 MG tablet, Take 1 tablet (50 mg) by mouth daily, Disp: 90 tablet, Rfl: 0     sulfamethoxazole-trimethoprim (BACTRIM/SEPTRA) 400-80 MG tablet, Take 1 tablet by mouth twice a week Monday and Thursday only, Disp: 30 tablet, Rfl: 11     tacrolimus (GENERIC EQUIVALENT) 1 MG capsule, Take 2 capsules (2 mg) by mouth every morning AND 1 capsule (1 mg) every evening., Disp: 90 capsule, Rfl: 0     tamsulosin (FLOMAX) 0.4 MG capsule, TAKE 1 CAPSULE(0.4 MG) BY MOUTH DAILY, Disp: 90 capsule, Rfl: 3     valGANciclovir (VALCYTE) 450 MG tablet, Take 1 tablet (450 mg) by mouth every other day, Disp: 15 tablet, Rfl: 1     vitamin B-12 (CYANOCOBALAMIN) 1000 MCG tablet, Take 1 tablet (1,000 mcg) by mouth daily, Disp: 30 tablet, Rfl: 0     vitamin D3 (CHOLECALCIFEROL) 2000 units (50 mcg) tablet, Take 1 tablet (2,000 Units) by mouth daily, Disp: 30 tablet, Rfl: 0     Allergies:   Codeine and Seasonal allergies      Past Medical History:  Chronic kidney disease, stage 3  Type 2 diabetes mellitus  Bipolar affective disorder   Brow ptosis  Hepatocellular carcinoma  Coronary artery disease   Hypertension   Anemia   Anxiety   Mild recurrent major depression  Mild nonproliferative retinopathy  Gastroesophageal reflux disease   Cholelithiasis   Benign prostatic hypertrophy   Portal vein thrombosis      Past Surgical History:  Liver transplant recipient   Coronary catheterization  Parotidectomy, radical neck dissection  Right eyelid  "weight placement  Orthopedic surgery    Family History:   Prostate cancer - maternal grandfather, father   Substance abuse - maternal grandfather, maternal grandmother   Colon cancer - father, paternal grandmother  Cancer - mother  Thyroid disease - mother, sister   Stroke - mother  Depression - mother, sister  Asthma - sister      Social History:   Presents to clinic alone  Tobacco Use: Former smoker, 180 pack year history, quit 2017.   Alcohol Use: quit September 1996  PCP: Maximo Tucker      Physical Exam:  /62   Pulse 98   Temp 97.5  F (36.4  C) (Oral)   Ht 1.753 m (5' 9\")   Wt 74.8 kg (164 lb 12.8 oz)   SpO2 100%   BMI 24.34 kg/m       GA: anxious but in NAD  HEENT: no scleral icterus, no cervical LAD  CV: regular, no rub, no JVD, no edema  PULM: Lungs CTA B  GI: abd soft, NT, ND  : no CVA tenderness  MSK: no joint effusions  SKIN: no concerning rash  NEURO: no gross focal deficit    Laboratory:  CMP  Recent Labs   Lab Test 03/02/20  1012 03/02/20  0813 02/24/20  0853 02/18/20  0810    141 140 142   POTASSIUM 5.6* 5.0 4.7 5.1   CHLORIDE 113* 112* 114* 112*   CO2 21 21 20 24   ANIONGAP 6 8 6 6   * 170* 125* 142*   BUN 56* 54* 43* 45*   CR 1.92* 1.91* 1.67* 1.58*   GFRESTIMATED 38* 38* 45* 48*   GFRESTBLACK 44* 44* 52* 56*   DEBORAH 9.5 9.5 9.2 9.0   MAG 2.1 2.3 2.1 1.9   PHOS 4.0 4.3 3.7 3.5   PROTTOTAL 7.6 8.3 7.6 7.2   ALBUMIN 4.0 4.2 4.1 4.1   BILITOTAL 0.4 0.5 0.6 0.7   ALKPHOS 146 157* 129 113   AST 15 13 19 14   ALT 21 25 23 26     CBC  Recent Labs   Lab Test 03/02/20  1012 03/02/20  0813 02/24/20  0853 02/18/20  0810   HGB 6.6* 7.6* 7.1* 7.5*   WBC 3.4* 4.0 2.6* 1.5*   RBC 1.89* 2.22* 2.09* 2.27*   HCT 20.2* 22.6* 21.1* 22.2*   * 102* 101* 98   MCH 34.9* 34.2* 34.0* 33.0   MCHC 32.7 33.6 33.6 33.8   RDW Dimorphic population - unable to calculate Dimorphic population - unable to calculate 25.2* 23.8*   PLT 75* 91* 86* 95*     INR  Recent Labs   Lab Test 01/24/20  0837 " 12/05/19  0824 11/16/19  0648 11/15/19  0449  11/12/19  1400  11/12/19  0346  11/11/19  1651 11/11/19  1600   INR 1.09 1.14 1.11 1.12   < > 1.46*   < > 1.47*   < >  --  1.60*   PTT  --   --   --   --   --  39*  --  57*  --  48* 48*    < > = values in this interval not displayed.     ABG  Recent Labs   Lab Test 11/12/19  1513 11/12/19  1400 11/11/19  1735 11/11/19  1651 11/11/19  1600   PH 7.37 7.36  --  7.41 7.48*   PCO2 42 43  --  42 36   PO2 114* 92  --  81 98   HCO3 24 24  --  27 26   O2PER 44% 41% 30 32 32.0      URINE STUDIES  Recent Labs   Lab Test 12/04/19  1400 11/15/19  1123 07/08/19  1017 02/04/19  0705   COLOR Light Yellow Light Yellow Yellow Yellow   APPEARANCE Clear Clear Clear Clear   URINEGLC 30* 300* >499* 150*   URINEBILI Negative Negative Negative Negative   URINEKETONE Negative Negative Negative Negative   SG 1.009 1.007 1.017 1.016   UBLD Negative Small* Negative Negative   URINEPH 5.0 6.5 5.0 5.0   PROTEIN 30* Negative Negative Negative   NITRITE Negative Negative Negative Negative   LEUKEST Negative Negative Negative Negative   RBCU 0 0 <1 1   WBCU 1 <1 3 1     Recent Labs   Lab Test 03/02/20  1013 12/02/19  1040 07/08/19  1017 02/04/19  0705   UTPG 0.29* 1.22* 0.11 0.14     PTH  Recent Labs   Lab Test 07/08/19  1020   PTHI 54     IRON STUDIES   Recent Labs   Lab Test 01/27/20  1340 12/02/19  1034 12/28/18  1108 09/15/18  1215 06/09/17  1250   IRON  --  88  --  52  --    FEB  --  248  --  403  --    IRONSAT  --  36  --  13*  --    QUAN 690* 1,353* 90 10* 450*       Scribe Disclosure:   I, Rani Barclay, am serving as a scribe to document services personally performed by Eloy Rao MD at this visit, based upon the provider's statements to me. All documentation has been reviewed by the aforementioned provider prior to being entered into the official medical record.     Total time spent was >40minutes, and more than 50% of face to face time was spent in counseling and/or coordination  of care regarding principles of multidisciplinary care, medication management, and chronic kidney disease education.  Eloy Rao MD         Again, thank you for allowing me to participate in the care of your patient.      Sincerely,    Eloy Rao MD

## 2020-03-02 NOTE — PROGRESS NOTES
[facility]       SUBJECTIVE:  Frandy Workman is a 55 year old male with PMH notable for DM type II, ESTRADA cirrhosis, hepaocellular carcinoma s/p TACE (1/2018), s/p liver transplant (11/11/19)  who presents today for routine follow up. He has no acute complaints today. He has been having significantly decrease confusion and has been able to participate in his ADLs without difficulty. He was able to walk 1 mile without stopping several days ago which he has continued to do. Neuropathic pain in lower extremities has subsided, but still has some numbness. His mood appears dramatically improved compared to prior visits and is much less frustrated by his ongoing medical issues. He was evaluated in nephrology clinic yesterday at which time he was noted to have hgb of 6.6 and Cr 1.92, up from 1.67 on 2/24. He is scheduled for blood transfusion after clinic appt today.     He brings with him detailed logs of fasting blood sugars and vitals he had taken at home. He does endorse transient tachycardia in the AM which resolves spontaneously before breakfast. Fastest HR recorded was 124. Denies any chest pain, palpitations, lightheadedness, shortness of breath, falls, gait imbalance, fevers, or chills.        Medications and allergies reviewed by me today.     ROS:   Constitutional, neuro, ENT, endocrine, pulmonary, cardiac, gastrointestinal, genitourinary, musculoskeletal, integument and psychiatric systems are negative, except as otherwise noted.    OBJECTIVE:  /72 (BP Location: Right arm, Patient Position: Sitting, Cuff Size: Adult Regular)   Pulse 92   Wt 71.9 kg (158 lb 9.6 oz)   SpO2 100%   BMI 23.42 kg/m     Wt Readings from Last 1 Encounters:   03/03/20 71.9 kg (158 lb 9.6 oz)     Body mass index is 23.42 kg/m .  Vitals were reviewed      GENERAL APPEARANCE: healthy, alert and no distress     EYES: EOMI,  PERRL     HENT: nose and mouth without ulcers or lesions     NECK: no  adenopathy, no asymmetry, masses, or scars      RESP: Diminished in right lower base - no rales, rhonchi or wheezes     CV: regular rates and rhythm, normal S1 S2, no murmur, click or rub     ABDOMEN:  soft, nontender, no HSM or masses and bowel sounds normal     MS: extremities normal- no gross deformities noted, no evidence of inflammation in joints, FROM in all extremities.     SKIN: no suspicious lesions or rashes over exposed areas      NEURO: upper and lower extremity tremor, monofilament exam negative over dorsal and plantar surfaces, freely moving all extremities, stable gait, no focal weakness.      PSYCH: mentation appears normal. and affect normal/bright     LYMPHATICS: No cervical adenopathy     ASSESSMENT/PLAN:    Frandy was seen today for diabetes.    Diagnoses and all orders for this visit:    Type 2 diabetes with retinopathy    Healthcare maintenance  Lipid: Lipid panel reflex to direct LDL Fasting; Future today  Colonoscopy (50-75 yrs): 12/6/19  HIV/HCV if risk factors: 11/10/19, negative  Safety/Lifestyle: safe at home, no impairment to ADLs  Tob/EtOH: n/a  BP screening: Normotensive, avoid hypotension in CKD  Most Recent Immunizations   Administered Date(s) Administered     Flu 65+ Years 08/31/2017     Flu, Unspecified 09/28/2019     HEPA 08/16/2016     HepB 08/16/2016     Influenza Vaccine IM > 6 months Valent IIV4 09/08/2018     Influenza Vaccine, 6+MO IM (QUADRIVALENT W/PRESERVATIVES) 09/28/2019     Mantoux Tuberculin Skin Test 12/11/2019     Pneumo Conj 13-V (2010&after) 10/01/2019     Pneumococcal 23 valent 09/30/2015     TDAP Vaccine (Boostrix) 12/29/2015       Pt should return to clinic for f/u with me in 3 months for follow up    Options for treatment and follow-up care were reviewed with the patient. Frandy Workman engaged in the decision making process and verbalized understanding of the options discussed and agreed with the final plan.    Maximo Tucker, DO  Internal Medicine,  PGY3  Mar 3, 2020    Plan of care discussed with Dr. Moe    Teaching Physician Note:    While the patient was in clinic, I reviewed the patient's medical history and results, the resident's findings on physical examination, and the patient's diagnosis and treatment plan with the resident.  I agree with the information documented with the following exceptions: none.    Nerissa Moe M.D.  Internal Medicine  [facility] Center   pager [phone]

## 2020-03-03 ENCOUNTER — OFFICE VISIT (OUTPATIENT)
Dept: INTERNAL MEDICINE | Facility: CLINIC | Age: 55
End: 2020-03-03
Payer: MEDICARE

## 2020-03-03 ENCOUNTER — TELEPHONE (OUTPATIENT)
Dept: NEPHROLOGY | Facility: CLINIC | Age: 55
End: 2020-03-03

## 2020-03-03 ENCOUNTER — INFUSION THERAPY VISIT (OUTPATIENT)
Dept: INFUSION THERAPY | Facility: CLINIC | Age: 55
End: 2020-03-03
Attending: SURGERY
Payer: MEDICARE

## 2020-03-03 VITALS
OXYGEN SATURATION: 100 % | DIASTOLIC BLOOD PRESSURE: 72 MMHG | HEART RATE: 92 BPM | BODY MASS INDEX: 23.42 KG/M2 | SYSTOLIC BLOOD PRESSURE: 124 MMHG | WEIGHT: 158.6 LBS

## 2020-03-03 VITALS
RESPIRATION RATE: 20 BRPM | TEMPERATURE: 98.6 F | SYSTOLIC BLOOD PRESSURE: 113 MMHG | OXYGEN SATURATION: 99 % | DIASTOLIC BLOOD PRESSURE: 65 MMHG | HEART RATE: 100 BPM

## 2020-03-03 DIAGNOSIS — E11.3293 TYPE 2 DIABETES MELLITUS WITH MILD NONPROLIFERATIVE RETINOPATHY OF BOTH EYES WITHOUT MACULAR EDEMA, UNSPECIFIED WHETHER LONG TERM INSULIN USE (H): Primary | ICD-10-CM

## 2020-03-03 DIAGNOSIS — Z00.00 HEALTHCARE MAINTENANCE: ICD-10-CM

## 2020-03-03 DIAGNOSIS — N18.30 ANEMIA DUE TO STAGE 3 CHRONIC KIDNEY DISEASE (H): ICD-10-CM

## 2020-03-03 DIAGNOSIS — D64.9 LOW HEMOGLOBIN: Primary | ICD-10-CM

## 2020-03-03 DIAGNOSIS — D63.1 ANEMIA DUE TO STAGE 3 CHRONIC KIDNEY DISEASE (H): ICD-10-CM

## 2020-03-03 LAB
BLD PROD TYP BPU: NORMAL
BLD UNIT ID BPU: 0
BLOOD PRODUCT CODE: NORMAL
BPU ID: NORMAL
CMV DNA SPEC NAA+PROBE-ACNC: NORMAL [IU]/ML
CMV DNA SPEC NAA+PROBE-LOG#: NORMAL {LOG_IU}/ML
SPECIMEN SOURCE: NORMAL
TRANSFUSION STATUS PATIENT QL: NORMAL
TRANSFUSION STATUS PATIENT QL: NORMAL

## 2020-03-03 PROCEDURE — P9016 RBC LEUKOCYTES REDUCED: HCPCS

## 2020-03-03 PROCEDURE — 36430 TRANSFUSION BLD/BLD COMPNT: CPT

## 2020-03-03 ASSESSMENT — PAIN SCALES - GENERAL: PAINLEVEL: NO PAIN (0)

## 2020-03-03 NOTE — TELEPHONE ENCOUNTER
"Patient states he received information through [employer] regarding a clinic visit tomorrow 3/3/20 that is not as he understood it.  States he is to have an \"infusion tomorrow at 1:00pm.\"  Per [facility] Appointment tab this RN reviewed with Caller the Appointments as scheduled 3/3/20.  Advised to call Clinic tomorrow morning to discuss Patient questions. Also advised to keep appointments 3/3/20 as scheduled.    Protocol-  Information Only Call- Adult  Care advice reviewed.   Disposition-  Information given  Caller states understanding of the recommended disposition.   Advised to call back if further questions or concerns.     Puja Foley, ARMANDN RN  [facility]     Reason for Disposition    Health Information question, no triage required and triager able to answer question    Protocols used: INFORMATION ONLY CALL-A-AH    "

## 2020-03-03 NOTE — NURSING NOTE
Chief Complaint   Patient presents with     Diabetes     Pt comes in for diabetic follow up      Valerie Dallas EMT at 11:43 AM sign on 3/3/2020

## 2020-03-03 NOTE — PATIENT INSTRUCTIONS
Dear Frandy Workman    Thank you for choosing [facility] ([facility]) for your blood transfusion.  The following information is a summary of our appointment as well as important reminders.      Patient Education     Blood Transfusion  Questions and Answers  What is a transfusion?  During your treatment, we may need to give you blood. This is called a transfusion. Blood is given through a needle in the vein. It takes 1 to 4 hours. It may include:    Red blood cells. These help replace blood you may have lost through bleeding or illness. They will increase your blood s ability to carry oxygen.    Platelets. These help blood to clot. They are used for low platelet counts and some bleeding disorders.    Plasma and cryoprecipitate. These help the blood to clot. They are used to treat some bleeding disorders.    Granulocytes (a type of white blood cell). These help your body fight infection.  If you need a transfusion, your doctor will prescribe one. We will ask you to sign a consent form before your first transfusion. Do not sign this form until all your questions have been answered.  Before your transfusion, we will double check your identity for your safety.  What is the risk of getting a disease from a transfusion?  The chances are low. Donors are carefully screened before giving blood. Also, before any donated blood is used, it must be tested for infectious diseases.   Here are example of your risk for infection:    Hepatitis B: 1 in 200,000    Hepatitis C: 1 in 1.8 million    HIV: 1 in 2.3 million    HTLV-I: 1 in 3 million    Bacterial infection:  ? 1 in 500,000 if receiving red blood cells  ? 1 in 75,000 if receiving platelets    Other infections (such as West Nile virus): less than 1 in 7 million    Other diseases (such as Chagas s disease): very rare    Babesiosis: rare, but risk is higher in summer  What are some of the side effects?  While most  transfusions have no side effects, you could have some of the symptoms listed below. If youthink you may be having a reaction, tell your doctor or nurse right away.  Common reactions include:    Fever or chills    Skin rash, hives, itching or flushing    Wheezing or trouble breathing    Chest or back pain    Facial swelling    New or ongoing cough    Bruising    Red or brown urine, or less urine output    Jaundice (yellow eyes and skin)    Irritation at the infusion site.  Some symptoms may occur up to four weeks after a transfusion. If you have any kind of symptom, call your doctor.  What are the risks of not having a transfusion?    Anemia (low red blood cells). This might cause a fast heartbeat and make you feel weak, tired, and breathless. If severe, it might lead to organ failure and death.    Bleeding. If your blood doesn t clot well, blood loss might lead to anemia, brain damage or death.    Weaker immune system. Your body might be less able to fight infection or heal wounds.  Are there other options besides transfusion?  Medicine (such as erythropoietin or iron pills) can cause the body to make more red blood cells. It may take months to replace all of the red blood cells you have lost.  In some cases, you can donate your own blood before surgery. The blood may be given back to you during your surgery. This is called autologous donation.   For informational purposes only. Not to replace the advice of your health care provider.   Copyright ?  2005 [employer]. All rights reserved. [employer] [id number] - REV 04/16.    For informational purposes only. Not to replace the advice of your health care provider.  Copyright   2018 [employer]. All rights reserved.    Patient Education     After Your Blood Transfusion  Discharge Instructions  After you leave  After a blood transfusion (received red blood cells, platelets, plasma, cryo or granulocytes), you will need to watch for signs of a  reaction for the next 48 hours.  Call your clinic or 911 (or go to the Emergency room) if you have any signs of a reaction:    Shaking or chills    Fever (temperature above 100.0 F)    Headache    Nausea    Hives    Itching    Swelling of the face or feeling flushed    Ongoing dry cough (nothing is coughed up)    Trouble breathing or wheezing  Some signs of a reaction won't show up for a few [id number].  These may include:    Fatigue    Dizziness    Pink or red urine  Your clinic is:   ________________________________________  ________________________________________  For informational purposes only. Not to replace the advice of your health care provider.   Copyright   2015 [employer]. All rights reserved. [employer] 030423 - Rev 07/16.  For informational purposes only. Not to replace the advice of your health care provider.  Copyright   2018 [employer]. All rights reserved.                    We look forward in seeing you on your next appointment here at [facility] ([facility]).  Please don t hesitate to call us at [phone] to reschedule any of your appointments or to speak with one of the [facility] registered nurses.  It was a pleasure taking care of you today.    Sincerely,    [facility]  [address] [place] [address]  [id number]  Phone:  [phone]

## 2020-03-03 NOTE — PATIENT INSTRUCTIONS
[facility] Medication Refill Request Information:  * Please contact your pharmacy regarding ANY request for medication refills.  ** [facility] Prescription Fax = [phone]  * Please allow 3 business days for routine medication refills.  * Please allow 5 business days for controlled substance medication refills.     [facility] Test Result notification information:  *You will be notified with in 7-10 days of your appointment day regarding the results of your test.  If you are on MyChart you will be notified as soon as the provider has reviewed the results and signed off on them.    [facility]: [phone]          [facility]         Internal Medicine Resident                   Continuity Clinic    Who We Are    Resident Continuity Clinic is a part of the [facility] Primary Care Clinic.  Resident physicians see patients independently and establish a relationship with them over the course of their three-year residency program.  As with the Primary Care Clinic, our Resident Continuity Clinic models a group practice.  If your doctor is not available, you will be able to see another resident physician.  At the end of a resident s training, patients will be transitioned to a new resident physician for ongoing care.     We treat patients with a wide array of medical needs from routine physicals, to acute illnesses, to diabetes and blood pressure management, to complex medical illness.  What is a Resident Physician?    Resident physicians hold medical degrees and are doctors. They are training to become specialists in Internal Medicine. They work under the supervision of board-certified faculty physicians.  Expectations for Your Care    We strive to provide accessible, quality care at all times.    In order to provide this care, it is best to see your primary care resident doctor consistently rather switching between providers.  In the event you do see another physician, you should schedule  a follow-up visit with your usual primary care doctor.    If you are transitioning your care from another clinic, it is helpful to have your records available for your doctor to review.    We do not prescribe controlled substances, such as ADD medications or narcotic pain medications, on your first visit.  We will review your health records and concerns prior to devising a treatment plan with you in order to provide the best care.      Clinic Services     Extended clinic hours; patient  to help navigate your visit;  parking; laboratory and imaging services with evening and weekend hours    Multiple medical and surgical specialties in one building    Complementary services, including Nutrition, Integrative Medicine, Pharmacy consultations, Mental and Behavioral Health, Sports Medicine and Physical Therapy    Thank You    We would like to thank you for choosing the [facility] Resident Continuity Clinic for your primary care. You are making a priceless contribution to the training of the next generation of health care practitioners.     Contact us at [phone] for appointments or questions.    Resident Clinic Hours are Tuesdays and Thursdays, 7:30am-5:00pm    Residents   Wilmer Tovar MD  (Male)   Phil Tiwari MD   (Male)   Lennie Torres MD  (Female)  Pily Kennedy MD   (Female)   Celena Castillo MD   (Female)    Coral Almaraz MD    (Female)   Sean Coronel MD  (Male)   Kenny Méndez MD  (Male)    Malena Burton MD  (Female)   Ashok Doty MD  (Female)   Carey Lilly MD    (Female)   Maximo Tucker MD  (Male)   Jarrett Mckay MD  (Male)   Mohamud Tavarez MD  (Male)   FLAKO Bacon MD   (Male)   Leland Hobbs MD  (Male)    Cristin Cao MD (Female)   Ovidio Smith MD  (Male)   Luis F Manrique MD  (Male)   Wihtney Benitez MD  (Male)   Venice Sanabria MD    (Female)   Jamarcus Rebolledo MD  (Female)     Supervising Physicians   MD Julián Ortiz,  MD Natalie Russell, MD Castro Markham, MD Dieter Borja, MD Rani German, MD Brenda Delgadillo, MD Jose Luis Sanderson, MD Charlene Clark MD

## 2020-03-03 NOTE — PROGRESS NOTES
Blood Product Transfusion Nursing Note:    Frandy Workman presents today to [facility] for a 1 unit PRBC transfusion.  During today's [facility] appointment orders from Carla Pineda were completed.    Progress note:  ID verified by name and .  Assessment completed.  Vitals were stable throughout time in [facility].    Verbal education given to patient/representative regarding transfusion and possible side effects.  Patient/representative verbalized understanding.     present during visit today: Not Applicable.    All pertinent labs reviewed prior to infusion: YES, type and screen completed 3/2/2020.     Date of consent or authorization: 2019    Blood Product order verified and double checked for accuracy, 2nd : Em Amor RN    Patient tolerated the procedure well. Denied signs/symptoms of infusion reaction during transfusion and post transfusion completion while in [facility]. AVS printed and discharge instructions reviewed with patient. Denied questions/concerns. Discharged from [facility] in stable condition to home.    Transfusion given over approximately  2 hours.     Discharge Plan:    Discharge instructions were reviewed with patient Yes  Patient/representative verbalized understanding of discharge instructions and all questions answered Yes.        Mounika Geller RN    /65   Pulse 100   Temp 98.6  F (37  C) (Oral)   Resp 20   SpO2 99%

## 2020-03-03 NOTE — TELEPHONE ENCOUNTER
Writer spoke to patient, unsure why infusion appointment was scheduled for yesterday. Explained that if we do need, we can arrange but this was not ordered by Dr. Rao. Patient verbalized understanding. Currently receiving blood from transfusion.  Lupe Owens LPN  Nephrology  [phone]\

## 2020-03-04 ENCOUNTER — TELEPHONE (OUTPATIENT)
Dept: PHARMACY | Facility: CLINIC | Age: 55
End: 2020-03-04

## 2020-03-04 ENCOUNTER — OFFICE VISIT (OUTPATIENT)
Dept: OPHTHALMOLOGY | Facility: CLINIC | Age: 55
End: 2020-03-04
Attending: OPHTHALMOLOGY
Payer: MEDICARE

## 2020-03-04 ENCOUNTER — OFFICE VISIT (OUTPATIENT)
Dept: ENDOCRINOLOGY | Facility: CLINIC | Age: 55
End: 2020-03-04
Payer: MEDICARE

## 2020-03-04 ENCOUNTER — ALLIED HEALTH/NURSE VISIT (OUTPATIENT)
Dept: TRANSPLANT | Facility: CLINIC | Age: 55
End: 2020-03-04
Attending: INTERNAL MEDICINE
Payer: MEDICARE

## 2020-03-04 VITALS
HEIGHT: 69 IN | BODY MASS INDEX: 23.62 KG/M2 | HEART RATE: 87 BPM | SYSTOLIC BLOOD PRESSURE: 137 MMHG | WEIGHT: 159.5 LBS | DIASTOLIC BLOOD PRESSURE: 81 MMHG

## 2020-03-04 DIAGNOSIS — E11.22 TYPE 2 DIABETES MELLITUS WITH STAGE 3 CHRONIC KIDNEY DISEASE, WITH LONG-TERM CURRENT USE OF INSULIN (H): Primary | ICD-10-CM

## 2020-03-04 DIAGNOSIS — N18.4 CKD (CHRONIC KIDNEY DISEASE) STAGE 4, GFR 15-29 ML/MIN (H): Primary | ICD-10-CM

## 2020-03-04 DIAGNOSIS — N18.30 TYPE 2 DIABETES MELLITUS WITH STAGE 3 CHRONIC KIDNEY DISEASE, WITH LONG-TERM CURRENT USE OF INSULIN (H): Primary | ICD-10-CM

## 2020-03-04 DIAGNOSIS — N18.4 ANEMIA DUE TO STAGE 4 CHRONIC KIDNEY DISEASE (H): ICD-10-CM

## 2020-03-04 DIAGNOSIS — Z94.4 LIVER REPLACED BY TRANSPLANT (H): Primary | ICD-10-CM

## 2020-03-04 DIAGNOSIS — D63.1 ANEMIA DUE TO STAGE 4 CHRONIC KIDNEY DISEASE (H): ICD-10-CM

## 2020-03-04 DIAGNOSIS — E11.3313 TYPE 2 DIABETES MELLITUS WITH MODERATE NONPROLIFERATIVE RETINOPATHY OF BOTH EYES AND MACULAR EDEMA, UNSPECIFIED WHETHER LONG TERM INSULIN USE (H): ICD-10-CM

## 2020-03-04 DIAGNOSIS — Z79.4 TYPE 2 DIABETES MELLITUS WITH STAGE 3 CHRONIC KIDNEY DISEASE, WITH LONG-TERM CURRENT USE OF INSULIN (H): Primary | ICD-10-CM

## 2020-03-04 LAB — HBA1C MFR BLD: 4.8 % (ref 4.3–6)

## 2020-03-04 PROCEDURE — 25000128 H RX IP 250 OP 636: Mod: ZF | Performed by: OPHTHALMOLOGY

## 2020-03-04 PROCEDURE — G0463 HOSPITAL OUTPT CLINIC VISIT: HCPCS | Mod: ZF

## 2020-03-04 PROCEDURE — 92134 CPTRZ OPH DX IMG PST SGM RTA: CPT | Mod: ZF | Performed by: OPHTHALMOLOGY

## 2020-03-04 PROCEDURE — 67028 INJECTION EYE DRUG: CPT | Mod: RT,ZF | Performed by: OPHTHALMOLOGY

## 2020-03-04 RX ORDER — FLASH GLUCOSE SENSOR
KIT MISCELLANEOUS
Qty: 2 EACH | Refills: 11 | Status: ON HOLD | OUTPATIENT
Start: 2020-03-04 | End: 2020-06-12

## 2020-03-04 RX ORDER — FLASH GLUCOSE SCANNING READER
EACH MISCELLANEOUS
Qty: 1 EACH | Refills: 0 | Status: ON HOLD | OUTPATIENT
Start: 2020-03-04 | End: 2020-06-12

## 2020-03-04 RX ADMIN — AFLIBERCEPT 2 MG: 40 INJECTION, SOLUTION INTRAVITREAL at 14:05

## 2020-03-04 ASSESSMENT — VISUAL ACUITY
METHOD_MR: PT REQUESTS MRX TODAY.
CORRECTION_TYPE: GLASSES
OS_CC+: -2
OD_CC: 20/40
METHOD: SNELLEN - LINEAR
OD_CC+: -2
OD_PH_CC+: +2
OS_CC: 20/40
OD_PH_CC: 20/40

## 2020-03-04 ASSESSMENT — TONOMETRY
OD_IOP_MMHG: 16
IOP_METHOD: TONOPEN
OS_IOP_MMHG: 14

## 2020-03-04 ASSESSMENT — REFRACTION_WEARINGRX
OD_CYLINDER: +0.75
OS_ADD: +2.00
OS_SPHERE: -7.25
OD_AXIS: 132
SPECS_TYPE: PAL
OS_AXIS: 040
OS_CYLINDER: +0.75
OD_ADD: +2.00
OD_SPHERE: -7.25

## 2020-03-04 ASSESSMENT — EXTERNAL EXAM - RIGHT EYE: OD_EXAM: NORMAL

## 2020-03-04 ASSESSMENT — EXTERNAL EXAM - LEFT EYE: OS_EXAM: NORMAL

## 2020-03-04 ASSESSMENT — REFRACTION_MANIFEST
OD_AXIS: 125
OD_ADD: +2.50
OS_AXIS: 045
OS_SPHERE: -7.00
OS_CYLINDER: +0.50
OD_CYLINDER: +0.75
OD_SPHERE: -7.25
OS_ADD: +2.50

## 2020-03-04 ASSESSMENT — CUP TO DISC RATIO
OD_RATIO: 0.25
OS_RATIO: 0.3

## 2020-03-04 ASSESSMENT — CONF VISUAL FIELD
OS_NORMAL: 1
METHOD: COUNTING FINGERS
OD_NORMAL: 1

## 2020-03-04 ASSESSMENT — SLIT LAMP EXAM - LIDS
COMMENTS: SMALL PAPILLOMA ALONG LL MARGIN
COMMENTS: NORMAL

## 2020-03-04 ASSESSMENT — MIFFLIN-ST. JEOR: SCORE: 1543.87

## 2020-03-04 ASSESSMENT — PAIN SCALES - GENERAL: PAINLEVEL: NO PAIN (0)

## 2020-03-04 NOTE — LETTER
March 4, 2020      Frandy Workman  [address]   [place] [address]    Welcome to the Anemia Clinic!  You have been referred to our clinic by Eloy Rao MD for the monitoring of your anemia therapy.  The Anemia Clinic is a referral clinic staffed by [facility] pharmacists and nurses.    Our goals in monitoring your anemia therapy include:       Optimizing your anemia therapy.       Providing you with appropriate education and resources concerning your    anemia therapy.         Monitoring your lab values (Hemoglobin and iron) and adjusting your anemia                therapy as needed.  Your Hemoglobin Goal = 10-11.5 g/dl      If you use an outside lab to obtain blood draws, it will be your responsibility to report all lab results to the Anemia Clinic. Follow-up attempts will be made for one month, at that time if we have not heard back from you we will inactive your anemia prescriptions and refer your management back to the referring provider.    Please call the Anemia Clinic at [phone] if you have any questions.  Sincerely,    Jie Blum RN  Phone [phone]  March 4, 2020          Sincerely,        Vini Dexter RPH

## 2020-03-04 NOTE — PROGRESS NOTES
CC:  Follow up Diabetic macular edema  left eye     HPI: Frandy Workman is a  55 year old year-old patient with history of Diabetes mellitus for 24 ys . Patient on insulin.  Patient was evaluated by dr. Amezquita and sent to the retina clinic for management of Diabetic macular edema     Interval History: VA in right eye seems worsened (he associates this w/ allergies), left eye stable. No new flashes and floaters. Last Hemoglobin A1c was 5.1% on 12/2/19.    He had a liver transplant on 11/11/2019 and has been inpatient for ~2 months. He is on immunosuppressive medications. He is anemic and received 2 packs of blood this week. He says his kidney is not working well.     Retinal Imaging:  OCT 3-4-20  RE: diffuse Diabetic macular edema- worse   LE: diffuse DME with more involvement at fovea; small area of PAMM inferotemporal to fovea    fluorescein angiography transits right eye 9-5-19  Right eye: diffuse microaneurysms, late mild macular leakage; mild peripheral capillary non perfusion;  mild vessel leakage in late phase, no NVD/NVE  Left eye: diffuse microaneurysms, late mild macula leakage; mild peripheral capillary non perfusion;  mild vessel leakage in late phase, no NVD/NVE    Optos consistent with clinical exam    Manifest Refraction      Sphere Cylinder [place] Dist VA Add Near VA   Right -7.25 +0.75 125 20/30 +2.50 J2   Left -7.00 +0.50 045 20/25-2 +2.50 J1   Pt requests MRx today.       Assessment & Plan:    0. Status post liver transplant   -severe anemia; s/p transfusion   - being seen by his primary team    1.  Moderate nonproliferative diabetic retinopathy of both eyes    - exam stable with No NV on exam each eye    - last FA 9/5/19 improved leakage on fluorescein angiography compared to march, 2019   - Blood pressure (<120/80) and blood glucose (HbA1c <7.0, ~6.5 today) control discussed with patient. Patient advised that failure to adequately control each may lead to vision loss. The patient expressed  understanding.     2. Diabetic macular edema both eyes    - status post avastin x 4. Switch to Eylea 4/24/19 (s/p 3 injections) with improvement of Diabetic macular edema    - now with resolution of Diabetic macular edema.   - Last Eylea injection was 6/27/19 (15 weeks)   - patient has increased diffuse and cystic edema in both eyes right eye> left eye   - plan for Eylea right eye today   - observe left eye for now          3. Myopia, bilateral  Prescription given today (10/9/19)     4. Senile nuclear sclerosis, bilateral  Comment: Not visually significant OU    5. Dry eye syndrome   Left eye worse than right eye   warm compresses and artificial tears  As needed  -punctal plugs previously left eye - reports this is better     Plan: follow up 4 weeks for Optical Coherence Tomography both eyes and possible Eylea each eye and prescription (diagnostic MR)  No prescription given today        ~~~~~~~~~~~~~~~~~~~~~~~~~~~~~~~~~~   Complete documentation of historical and exam elements from today's encounter can be found in the full encounter summary report (not reduplicated in this progress note).  I personally obtained the chief complaint(s) and history of present illness.  I confirmed and edited as necessary the review of systems, past medical/surgical history, family history, social history, and examination findings as documented by others; and I examined the patient myself.  I personally reviewed the relevant tests, images, and reports as documented above.  I personally reviewed the ophthalmic test(s) associated with this encounter, agree with the interpretation(s) as documented by the resident/fellow, and have edited the corresponding report(s) as necessary.   I formulated and edited as necessary the assessment and plan and discussed the findings and management plan with the patient and family and No resident or fellow assisted with the procedures performed.  I performed the procedures myself.    Enriqueta Martin,  MD   of Ophthalmology.  Retina Service   Department of Ophthalmology and Visual Neurosciences   [facility]  Phone: [phone]   Fax: [phone]

## 2020-03-04 NOTE — PROGRESS NOTES
[facility]  Endocrinology  Kaley Cano MD  03/04/2020      Chief Complaint:   Diabetes    History of Present Illness:   Frandy Workman is a 55 year old male with a history of with a history of liver cirrhosis secondary to ESTRADA, HCC dx in 2018 s/p liver transplant in 11/2019 which was complicated by hematoma evacuation on POD 1, stage III CKD, CAD, HTN, HLD and diabetes mellitus type 2 with retinopathy who presents for follow-up.     He reports doing well but did have an hypoglycemic event yesterday where his BG was measured at 69 prior to dinner. He did not take his insulin at that time and later his BG elevated to 200's. He took 2 units of insulin prior to bed and woke up this morning with a BG at 129. When he goes low he does report symptoms of blurry eyes and fatigue. Notes his Tacrolimus will be increased to 5 mg BID tomorrow. Denies chest pain or shortness of breath.     Current diabetes regimen:   -Novolog  1 unit : 8 grams carb  -Novolog correction scale (check BG 4 times daily). Prior to meal: 1 unit per 50 mg/dL >140. Only taking Novolog at bedtime if >190 mg/dL.  Target 100-130 during the day.   -Basaglar 28 units daily     Example of meals during a day:   Tuesday March 3, 2020   Breakfast: 2 blueberry waffles, 1 cup milk, 1 blueberry yogurt, 2 tbs syrup. Total of 56 g and took 7 units   Snack: 1 Glucerna (16g)  Lunch: 1 Glucerna, 1 Boost, 1 strawberry cheesecake jello. 71 g and took 8 units   Snack: 1 Glucerna (16g)   Dinner 1 box pizza, 1 strawberry jello, 1 sprite 89 g and took 11 units   Snack: 1 Glucerna (16g)     Blood Glucose Monitoring:  We reviewed glucometer data together.  Average 14 day: 124   2.8 readings per day  Highest: 204   Lowest: 68; had a few BG in the low 70's prior to meals.      Average by time of day:   5-10am: 129   10-3pm: 129   3-9pm: 117     Targets   12am-7am: 100-140   7:00am-11:30am:    11:30am-8pm:    8pm-12am: 110-140     Diabetes monitoring and  complications:  CAD: Yes  Last eye exam results: 01/29/2020; stable with no evidence of retinopathy.   Microalbuminuria: 20 in 12/2019   Neuropathy: Yes; alpha-lipoic acid 600 mg daily   HTN: Yes; carvedilol 12.5 mg BID   On Statin: Yes; rosuvastatin 5 mg daily   On Aspirin: Yes   Depression: Yes; Zoloft 50 mg daily   Erectile dysfunction: Yes    Patient Supplied Answers To Diabetic Questionnaire  including 1/31/2020   1. Since your last visit to our clinic (or if this your first visit, since you last saw your primary care provider), have you experienced any of the following symptoms that may be related to low blood sugars? Shaking or trembling   2. Since your last visit to our clinic (or if this your first visit, since you last saw your primary care provider), have you experienced any of the following symptoms that may be related to prolonged high blood sugars? Blurry vision, Fatigue   4. Do you have any female family members who have had heart attacks or strokes before age 60 or male relatives who have had heart attacks or strokes before age 50? Yes   5. Do you have any family members who have had complications from diabetes such as kidney disease, heart disease or strokes, retinopathy (a vision problem), or amputations? No   6. Who do you live with?  Self   Little interest or pleasure in doing things? Several days   Feeling down, depressed, or hopeless? Several days      Review of Systems:   Pertinent items are noted in HPI.  All other systems are negative.    Active Medications:      Acetaminophen (TYLENOL) 325 MG CAPS, Take 325-650 mg by mouth every 4 hours as needed (pain, fever), Disp: 100 capsule, Rfl: 3     alpha-lipoic acid 600 MG capsule, Take 1 capsule (600 mg) by mouth daily, Disp: 90 capsule, Rfl: 3     Artificial Tear Solution (SM ARTIFICIAL TEARS) SOLN, Place 1 drop into the right eye every hour as needed (dry eyes) Apply at least 4 times daily and as needed for dry eye, Disp: 1 Bottle, Rfl: 3      aspirin 81 MG EC tablet, Take 1 tablet (81 mg) by mouth daily, Disp: , Rfl:      BD VIKTORIA U/F 32G X 4 MM insulin pen needle, Use 5 per day, Disp: 300 each, Rfl: 3     blood glucose (ACCU-CHEK EDINSON PLUS) test strip, USE TO TEST FOUR TIMES DAILY OR AS DIRECTED, Disp: 400 strip, Rfl: 6     blood glucose monitoring (ACCU-CHEK FASTCLIX) lancets, Use to test blood sugar 4 times daily or as directed.  1 box = 102 lancets, Disp: 408 each, Rfl: 3     econazole nitrate 1 % external cream, Apply topically daily To feet and toenails., Disp: 85 g, Rfl: 0     ferrous sulfate (FEROSUL) 325 (65 Fe) MG tablet, Take 1 tablet (325 mg) by mouth daily (with breakfast), Disp: 90 tablet, Rfl: 3     Glucagon 3 MG/DOSE POWD, Spray 1 Dose in nostril as needed (for low blood sugar with sedation or emesis (unable to ingest glucose)), Disp: 1 each, Rfl: 1     glucose (BD GLUCOSE) 4 g chewable tablet, Take 1 tablet by mouth every hour as needed for low blood sugar, Disp: 60 tablet, Rfl: 1     insulin aspart (NOVOLOG PEN) 100 UNIT/ML pen, Inject 1 unit : 8 grams carb + sliding scale 4 times daily Max units per day   80 units daily ., Disp: 75 mL, Rfl: 3     insulin glargine (BASAGLAR KWIKPEN) 100 UNIT/ML pen, Inject 28 Units Subcutaneous daily, Disp: 30 mL, Rfl: 3     lamiVUDine (EPIVIR) 100 MG tablet, Take 1 tablet (100 mg) by mouth daily, Disp: 30 tablet, Rfl: 3     magnesium oxide (MAG-OX) 400 MG tablet, Take 1 tablet (400 mg) by mouth daily, Disp: 60 tablet, Rfl: 0     melatonin 5 MG tablet, Take 5 mg by mouth nightly as needed for sleep Take at 6 pm every night, Disp: , Rfl:      Multiple Vitamin (THERAVITE PO), Take 1 tablet by mouth every morning , Disp: , Rfl:      omeprazole (PRILOSEC) 40 MG DR capsule, Take 1 capsule (40 mg) by mouth daily, Disp: 90 capsule, Rfl: 2     rosuvastatin (CRESTOR) 5 MG tablet, Take 1 tablet (5 mg) by mouth daily, Disp: 30 tablet, Rfl: 3     sertraline (ZOLOFT) 50 MG tablet, Take 1 tablet (50 mg) by mouth  daily, Disp: 90 tablet, Rfl: 0     sulfamethoxazole-trimethoprim (BACTRIM/SEPTRA) 400-80 MG tablet, Take 1 tablet by mouth twice a week Monday and Thursday only, Disp: 30 tablet, Rfl: 11     tacrolimus (GENERIC EQUIVALENT) 1 MG capsule, Take 2 capsules (2 mg) by mouth every morning AND 1 capsule (1 mg) every evening. (Patient taking differently: take 5 mg in the morning and 5 mg in the evening), Disp: 90 capsule, Rfl: 0     tamsulosin (FLOMAX) 0.4 MG capsule, TAKE 1 CAPSULE(0.4 MG) BY MOUTH DAILY, Disp: 90 capsule, Rfl: 3     valGANciclovir (VALCYTE) 450 MG tablet, Take 1 tablet (450 mg) by mouth every other day, Disp: 15 tablet, Rfl: 1     vitamin B-12 (CYANOCOBALAMIN) 1000 MCG tablet, Take 1 tablet (1,000 mcg) by mouth daily, Disp: 30 tablet, Rfl: 0     vitamin D3 (CHOLECALCIFEROL) 2000 units (50 mcg) tablet, Take 1 tablet (2,000 Units) by mouth daily, Disp: 30 tablet, Rfl: 0    Current Facility-Administered Medications:      Aflibercept (EYLEA) injection 2 mg, 2 mg, Intravitreal, Q28 Days, Enriqueta Martin MD      Allergies:   Codeine and Seasonal allergies      Past Medical History:  Anemia   BPH   CAD   HTN   HLD   Depression   Conductive hearing loss   GERD   Hepatocellular carcinoma   Liver cirrhosis secondary to ESTRADA   Immunosuppressed    Malnutrition   Chronic CKD stage III   Portal vein thrombosis   Diabetes mellitus type 2   Retinopathy   Bipolar affective disorder   Esophageal varices   Erectile dysfunction   Anxiety      Past Surgical History:  Heart catheterization-02/2019   Gold weight eyelid implant; right-11/2017   Chemo embolization-12/2019   Left knee surgery   Right parotidectomy with radical neck dissection-11/2017   Liver transplant-11/11/2019   Exploratory laparotomy, hematoma evacuation and abdominal washout-11/12/2019     Family History:   Father: colon cancer, pancreatic cancer, prostate cancer, colorectal cancer, macular degeneration, glaucoma, skin cancer   Mother: cancer,  "diabetes, cerebrovascular disease, thyroid disease, depression   Sister: asthma, thyroid disease, depression   Maternal grandfather: prostate cancer, substance abuse   Maternal grandmother: colorectal cancer, substance abuse   Paternal grandmother: colorectal cancer       Social History:   The patient was alone   Smoking Status: never   Smokeless Tobacco: former; chew (1 tin per week)    Alcohol Use: former; quit in 1996       Physical Exam:   /81   Pulse 87   Ht 1.753 m (5' 9\")   Wt 72.3 kg (159 lb 8 oz)   BMI 23.55 kg/m       Wt Readings from Last 10 Encounters:   03/04/20 72.3 kg (159 lb 8 oz)   03/03/20 71.9 kg (158 lb 9.6 oz)   03/02/20 74.8 kg (164 lb 12.8 oz)   02/11/20 73.3 kg (161 lb 8 oz)   02/06/20 71.9 kg (158 lb 8 oz)   01/27/20 73.1 kg (161 lb 3.2 oz)   01/21/20 72.9 kg (160 lb 11.2 oz)   01/13/20 74.3 kg (163 lb 14.4 oz)   01/09/20 73.9 kg (162 lb 14.4 oz)   12/30/19 75.9 kg (167 lb 6.4 oz)        Constitutional: Pleasant no acute cardiopulmonary distress.   EYES: anicteric, normal extra-ocular movements, no lid lag or retraction.  HEENT: Mouth/Throat: Mucous membrane is moist.   Cardiovascular: RRR, S1, S2 normal.   Pulmonary/Chest: CTAB. No wheezing or rales.   Abdominal: +BS. Non tender to palpation.    Neurological: Alert and oriented.    Extremities: No edema.  Psychological: appropriate mood and affect      Data:  Lab Results   Component Value Date     03/02/2020    POTASSIUM 5.6 (H) 03/02/2020    CHLORIDE 113 (H) 03/02/2020    CO2 21 03/02/2020    ANIONGAP 6 03/02/2020     (H) 03/02/2020    BUN 56 (H) 03/02/2020    CR 1.92 (H) 03/02/2020    DEBORAH 9.5 03/02/2020     Lab Results   Component Value Date    GFRESTIMATED 38 (L) 03/02/2020    GFRESTIMATED 38 (L) 03/02/2020    GFRESTIMATED 45 (L) 02/24/2020    GFRESTBLACK 44 (L) 03/02/2020    GFRESTBLACK 44 (L) 03/02/2020    GFRESTBLACK 52 (L) 02/24/2020      Lab Results   Component Value Date    MICROL 72 03/02/2020    UMALCR " 70.89 (H) 03/02/2020        Lab Results   Component Value Date    A1C 6.6 (H) 08/08/2018    A1C 6.5 (H) 06/09/2017    A1C 7.8 (H) 10/25/2016    HEMOGLOBINA1 4.8 03/04/2020    HEMOGLOBINA1 5.1 12/02/2019    HEMOGLOBINA1 5.4 08/14/2019    HEMOGLOBINA1 6.1 (A) 02/11/2019    HEMOGLOBINA1 8.1 (A) 04/11/2018     Lab Results   Component Value Date    CHOL 131 02/04/2019    CHOL 133 08/08/2018    TRIG 117 02/04/2019    TRIG 172 (H) 08/08/2018    HDL 26 (L) 02/04/2019    HDL 30 (L) 08/08/2018    LDL 81 02/04/2019    LDL 68 08/08/2018    NHDL 105 02/04/2019    NHDL 103 08/08/2018       Assessment:  Type 2 diabetes mellitus with stage 3 chronic kidney disease, with long-term current use of insulin (H)  Frandy is a 55 year old male who has a 25+ year history of type II diabetes which is now managed with insulin only. His A1c today was 4.8% (altered by recent blood transfusion). Doing well except for few mild hypoglycemia. Low BG's prior to meals therefore will reduce mealtime insulin. his mealtime insulin to 1 unit for every 10 grams of carbohydrates (instead of 1 unit for every 8 grams of carbohydrates). Will have him start on Freestyle Benjamin so we can have CGM data to review at his next appointment.      Plan:  -Continue with Lantus 28 units daily.   -Change mealtime insulin to 1 unit for every 10 grams of carbohydrates (instead of 1 unit for every 8 grams of carbohydrates). Updated the new meal CHO coverage in his meter.   No change in his correction scale.   -Contact us if any values <70.      Follow-up: Return for already scheduled with Tish and we will keep that .     >50% of 25 minute visit spent in face to face counseling, education and coordination of care related to options for better glycemic control as well as preventing, detecting, and treating hypoglycemia.         Scribe Disclosure:  I, Ana Henning, am serving as a scribe to document services personally performed by Kaley Cano MD at this visit,  based upon the provider's statements to me. All documentation has been reviewed by the aforementioned provider prior to being entered into the official medical record.     Portions of this medical record were completed by a scribe. UPON MY REVIEW AND AUTHENTICATION BY ELECTRONIC SIGNATURE, this confirms (a) I performed the applicable clinical services, and (b) the record is accurate.    Patient Instructions   Continue with Lants 28 units daily.     Change mealtime insulin to 1 unit for every 10 grams of carbohydrates (instead of 1 unit for every 8 grams of carbohydrates)     Please follow this correction scale three times with meals and bedtime  Don't give additional insulin if blood sugar less than 140.   For Pre-Meal  - 189 give 1 unit.   For Pre-Meal  - 239 give 2 units.   For Pre-Meal  - 289 give 3 units.   For Pre-Meal  - 339 give 4 units.   For Pre-Meal BG = or > 340 give 5 units.     If you get low blood sugars below 70 on more than two occasions       Kaley Cano MD  Staff Endocrinologist   Endocrinology and Metabolism  [facility]  Pager: [phone]

## 2020-03-04 NOTE — NURSING NOTE
Chief Complaint   Patient presents with     RECHECK     Type 2 Diabetes     Capillary puncture performed for Hemoglobin A1C test. Patient tolerated well.    Wendy Porter MA

## 2020-03-04 NOTE — LETTER
3/4/2020       RE: Frandy Workman  [address]  [facility] [address]     Dear Colleague,    Thank you for referring your patient, Frandy Workman, to the [facility] ENDOCRINOLOGY at [facility]. Please see a copy of my visit note below.    [facility]  Endocrinology  Kaley Cano MD  03/04/2020      Chief Complaint:   Diabetes    History of Present Illness:   Frandy Workman is a 55 year old male with a history of with a history of liver cirrhosis secondary to ESTRADA, HCC dx in 2018 s/p liver transplant in 11/2019 which was complicated by hematoma evacuation on POD 1, stage III CKD, CAD, HTN, HLD and diabetes mellitus type 2 with retinopathy who presents for follow-up.     He reports doing well but did have an hypoglycemic event yesterday where his BG was measured at 69 prior to dinner. He did not take his insulin at that time and later his BG elevated to 200's. He took 2 units of insulin prior to bed and woke up this morning with a BG at 129. When he goes low he does report symptoms of blurry eyes and fatigue. Notes his Tacrolimus will be increased to 5 mg BID tomorrow. Denies chest pain or shortness of breath.     Current diabetes regimen:   -Novolog  1 unit : 8 grams carb  -Novolog correction scale (check BG 4 times daily). Prior to meal: 1 unit per 50 mg/dL >140. Only taking Novolog at bedtime if >190 mg/dL.   Target 100-130 during the day.   -Basaglar 28 units daily     Example of meals during a day:   Tuesday March 3, 2020   Breakfast: 2 blueberry waffles, 1 cup milk, 1 blueberry yogurt, 2 tbs syrup. Total of 56 g and took 7 units   Snack: 1 Glucerna (16g)  Lunch: 1 Glucerna, 1 Boost, 1 strawberry cheesecake jello. 71 g and took 8 units   Snack: 1 Glucerna (16g)   Dinner 1 box pizza, 1 strawberry jello, 1 sprite 89 g and took 11 units   Snack: 1 Glucerna (16g)     Blood Glucose Monitoring:  We reviewed glucometer data together.  Average 14 day: 124   2.8 readings per  day  Highest: 204   Lowest: 68; had a few BG in the low 70's prior to meals.      Average by time of day:   5-10am: 129   10-3pm: 129   3-9pm: 117     Targets   12am-7am: 100-140   7:00am-11:30am:    11:30am-8pm:    8pm-12am: 110-140     Diabetes monitoring and complications:  CAD: Yes  Last eye exam results: 01/29/2020; stable with no evidence of retinopathy.   Microalbuminuria: 20 in 12/2019   Neuropathy: Yes; alpha-lipoic acid 600 mg daily   HTN: Yes; carvedilol 12.5 mg BID   On Statin: Yes; rosuvastatin 5 mg daily   On Aspirin: Yes   Depression: Yes; Zoloft 50 mg daily   Erectile dysfunction: Yes    Patient Supplied Answers To Diabetic Questionnaire  including 1/31/2020   1. Since your last visit to our clinic (or if this your first visit, since you last saw your primary care provider), have you experienced any of the following symptoms that may be related to low blood sugars? Shaking or trembling   2. Since your last visit to our clinic (or if this your first visit, since you last saw your primary care provider), have you experienced any of the following symptoms that may be related to prolonged high blood sugars? Blurry vision, Fatigue   4. Do you have any female family members who have had heart attacks or strokes before age 60 or male relatives who have had heart attacks or strokes before age 50? Yes   5. Do you have any family members who have had complications from diabetes such as kidney disease, heart disease or strokes, retinopathy (a vision problem), or amputations? No   6. Who do you live with?  Self   Little interest or pleasure in doing things? Several days   Feeling down, depressed, or hopeless? Several days      Review of Systems:   Pertinent items are noted in HPI.  All other systems are negative.    Active Medications:      Acetaminophen (TYLENOL) 325 MG CAPS, Take 325-650 mg by mouth every 4 hours as needed (pain, fever), Disp: 100 capsule, Rfl: 3     alpha-lipoic acid 600 MG  capsule, Take 1 capsule (600 mg) by mouth daily, Disp: 90 capsule, Rfl: 3     Artificial Tear Solution (SM ARTIFICIAL TEARS) SOLN, Place 1 drop into the right eye every hour as needed (dry eyes) Apply at least 4 times daily and as needed for dry eye, Disp: 1 Bottle, Rfl: 3     aspirin 81 MG EC tablet, Take 1 tablet (81 mg) by mouth daily, Disp: , Rfl:      BD VIKTORIA U/F 32G X 4 MM insulin pen needle, Use 5 per day, Disp: 300 each, Rfl: 3     blood glucose (ACCU-CHEK EDINSON PLUS) test strip, USE TO TEST FOUR TIMES DAILY OR AS DIRECTED, Disp: 400 strip, Rfl: 6     blood glucose monitoring (ACCU-CHEK FASTCLIX) lancets, Use to test blood sugar 4 times daily or as directed.  1 box = 102 lancets, Disp: 408 each, Rfl: 3     econazole nitrate 1 % external cream, Apply topically daily To feet and toenails., Disp: 85 g, Rfl: 0     ferrous sulfate (FEROSUL) 325 (65 Fe) MG tablet, Take 1 tablet (325 mg) by mouth daily (with breakfast), Disp: 90 tablet, Rfl: 3     Glucagon 3 MG/DOSE POWD, Spray 1 Dose in nostril as needed (for low blood sugar with sedation or emesis (unable to ingest glucose)), Disp: 1 each, Rfl: 1     glucose (BD GLUCOSE) 4 g chewable tablet, Take 1 tablet by mouth every hour as needed for low blood sugar, Disp: 60 tablet, Rfl: 1     insulin aspart (NOVOLOG PEN) 100 UNIT/ML pen, Inject 1 unit : 8 grams carb + sliding scale 4 times daily Max units per day   80 units daily ., Disp: 75 mL, Rfl: 3     insulin glargine (BASAGLAR KWIKPEN) 100 UNIT/ML pen, Inject 28 Units Subcutaneous daily, Disp: 30 mL, Rfl: 3     lamiVUDine (EPIVIR) 100 MG tablet, Take 1 tablet (100 mg) by mouth daily, Disp: 30 tablet, Rfl: 3     magnesium oxide (MAG-OX) 400 MG tablet, Take 1 tablet (400 mg) by mouth daily, Disp: 60 tablet, Rfl: 0     melatonin 5 MG tablet, Take 5 mg by mouth nightly as needed for sleep Take at 6 pm every night, Disp: , Rfl:      Multiple Vitamin (THERAVITE PO), Take 1 tablet by mouth every morning , Disp: , Rfl:       omeprazole (PRILOSEC) 40 MG DR capsule, Take 1 capsule (40 mg) by mouth daily, Disp: 90 capsule, Rfl: 2     rosuvastatin (CRESTOR) 5 MG tablet, Take 1 tablet (5 mg) by mouth daily, Disp: 30 tablet, Rfl: 3     sertraline (ZOLOFT) 50 MG tablet, Take 1 tablet (50 mg) by mouth daily, Disp: 90 tablet, Rfl: 0     sulfamethoxazole-trimethoprim (BACTRIM/SEPTRA) 400-80 MG tablet, Take 1 tablet by mouth twice a week Monday and Thursday only, Disp: 30 tablet, Rfl: 11     tacrolimus (GENERIC EQUIVALENT) 1 MG capsule, Take 2 capsules (2 mg) by mouth every morning AND 1 capsule (1 mg) every evening. (Patient taking differently: take 5 mg in the morning and 5 mg in the evening), Disp: 90 capsule, Rfl: 0     tamsulosin (FLOMAX) 0.4 MG capsule, TAKE 1 CAPSULE(0.4 MG) BY MOUTH DAILY, Disp: 90 capsule, Rfl: 3     valGANciclovir (VALCYTE) 450 MG tablet, Take 1 tablet (450 mg) by mouth every other day, Disp: 15 tablet, Rfl: 1     vitamin B-12 (CYANOCOBALAMIN) 1000 MCG tablet, Take 1 tablet (1,000 mcg) by mouth daily, Disp: 30 tablet, Rfl: 0     vitamin D3 (CHOLECALCIFEROL) 2000 units (50 mcg) tablet, Take 1 tablet (2,000 Units) by mouth daily, Disp: 30 tablet, Rfl: 0    Current Facility-Administered Medications:      Aflibercept (EYLEA) injection 2 mg, 2 mg, Intravitreal, Q28 Days, Enriqueta Martin MD      Allergies:   Codeine and Seasonal allergies      Past Medical History:  Anemia   BPH   CAD   HTN   HLD   Depression   Conductive hearing loss   GERD   Hepatocellular carcinoma   Liver cirrhosis secondary to ESTRADA   Immunosuppressed    Malnutrition   Chronic CKD stage III   Portal vein thrombosis   Diabetes mellitus type 2   Retinopathy   Bipolar affective disorder   Esophageal varices   Erectile dysfunction   Anxiety      Past Surgical History:  Heart catheterization-02/2019   Gold weight eyelid implant; right-11/2017   Chemo embolization-12/2019   Left knee surgery   Right parotidectomy with radical neck dissection-11/2017  "  Liver transplant-11/11/2019   Exploratory laparotomy, hematoma evacuation and abdominal washout-11/12/2019     Family History:   Father: colon cancer, pancreatic cancer, prostate cancer, colorectal cancer, macular degeneration, glaucoma, skin cancer   Mother: cancer, diabetes, cerebrovascular disease, thyroid disease, depression   Sister: asthma, thyroid disease, depression   Maternal grandfather: prostate cancer, substance abuse   Maternal grandmother: colorectal cancer, substance abuse   Paternal grandmother: colorectal cancer       Social History:   The patient was alone   Smoking Status: never   Smokeless Tobacco: former; chew (1 tin per week)    Alcohol Use: former; quit in 1996       Physical Exam:   /81   Pulse 87   Ht 1.753 m (5' 9\")   Wt 72.3 kg (159 lb 8 oz)   BMI 23.55 kg/m        Wt Readings from Last 10 Encounters:   03/04/20 72.3 kg (159 lb 8 oz)   03/03/20 71.9 kg (158 lb 9.6 oz)   03/02/20 74.8 kg (164 lb 12.8 oz)   02/11/20 73.3 kg (161 lb 8 oz)   02/06/20 71.9 kg (158 lb 8 oz)   01/27/20 73.1 kg (161 lb 3.2 oz)   01/21/20 72.9 kg (160 lb 11.2 oz)   01/13/20 74.3 kg (163 lb 14.4 oz)   01/09/20 73.9 kg (162 lb 14.4 oz)   12/30/19 75.9 kg (167 lb 6.4 oz)        Constitutional: Pleasant no acute cardiopulmonary distress.   EYES: anicteric, normal extra-ocular movements, no lid lag or retraction.  HEENT: Mouth/Throat: Mucous membrane is moist.   Cardiovascular: RRR, S1, S2 normal.   Pulmonary/Chest: CTAB. No wheezing or rales.   Abdominal: +BS. Non tender to palpation.    Neurological: Alert and oriented.    Extremities: No edema.  Psychological: appropriate mood and affect      Data:  Lab Results   Component Value Date     03/02/2020    POTASSIUM 5.6 (H) 03/02/2020    CHLORIDE 113 (H) 03/02/2020    CO2 21 03/02/2020    ANIONGAP 6 03/02/2020     (H) 03/02/2020    BUN 56 (H) 03/02/2020    CR 1.92 (H) 03/02/2020    DEBORAH 9.5 03/02/2020     Lab Results   Component Value Date    " GFRESTIMATED 38 (L) 03/02/2020    GFRESTIMATED 38 (L) 03/02/2020    GFRESTIMATED 45 (L) 02/24/2020    GFRESTBLACK 44 (L) 03/02/2020    GFRESTBLACK 44 (L) 03/02/2020    GFRESTBLACK 52 (L) 02/24/2020      Lab Results   Component Value Date    MICROL 72 03/02/2020    UMALCR 70.89 (H) 03/02/2020        Lab Results   Component Value Date    A1C 6.6 (H) 08/08/2018    A1C 6.5 (H) 06/09/2017    A1C 7.8 (H) 10/25/2016    HEMOGLOBINA1 4.8 03/04/2020    HEMOGLOBINA1 5.1 12/02/2019    HEMOGLOBINA1 5.4 08/14/2019    HEMOGLOBINA1 6.1 (A) 02/11/2019    HEMOGLOBINA1 8.1 (A) 04/11/2018     Lab Results   Component Value Date    CHOL 131 02/04/2019    CHOL 133 08/08/2018    TRIG 117 02/04/2019    TRIG 172 (H) 08/08/2018    HDL 26 (L) 02/04/2019    HDL 30 (L) 08/08/2018    LDL 81 02/04/2019    LDL 68 08/08/2018    NHDL 105 02/04/2019    NHDL 103 08/08/2018       Assessment:  Type 2 diabetes mellitus with stage 3 chronic kidney disease, with long-term current use of insulin (H)  Frandy is a 55 year old male who has a 25+ year history of type II diabetes which is now managed with insulin only. His A1c today was 4.8% (altered by recent blood transfusion). Doing well except for few mild hypoglycemia. Low BG's prior to meals therefore will reduce mealtime insulin. his mealtime insulin to 1 unit for every 10 grams of carbohydrates (instead of 1 unit for every 8 grams of carbohydrates). Will have him start on Freestyle Benjamin so we can have CGM data to review at his next appointment.      Plan:  -Continue with Lantus 28 units daily.   -Change mealtime insulin to 1 unit for every 10 grams of carbohydrates (instead of 1 unit for every 8 grams of carbohydrates). Updated the new meal CHO coverage in his meter.   No change in his correction scale.   -Contact us if any values <70.      Follow-up: Return for already scheduled with Tish and we will keep that .     >50% of 25 minute visit spent in face to face counseling, education and coordination  of care related to options for better glycemic control as well as preventing, detecting, and treating hypoglycemia.         Scribe Disclosure:  I, Ana Henning, am serving as a scribe to document services personally performed by Kaley Cano MD at this visit, based upon the provider's statements to me. All documentation has been reviewed by the aforementioned provider prior to being entered into the official medical record.     Portions of this medical record were completed by a scribe. UPON MY REVIEW AND AUTHENTICATION BY ELECTRONIC SIGNATURE, this confirms (a) I performed the applicable clinical services, and (b) the record is accurate.    Patient Instructions   Continue with Lants 28 units daily.     Change mealtime insulin to 1 unit for every 10 grams of carbohydrates (instead of 1 unit for every 8 grams of carbohydrates)     Please follow this correction scale three times with meals and bedtime  Don't give additional insulin if blood sugar less than 140.   For Pre-Meal  - 189 give 1 unit.   For Pre-Meal  - 239 give 2 units.   For Pre-Meal  - 289 give 3 units.   For Pre-Meal  - 339 give 4 units.   For Pre-Meal BG = or > 340 give 5 units.     If you get low blood sugars below 70 on more than two occasions       Kaley Cano MD  Staff Endocrinologist   Endocrinology and Metabolism  [facility]  Pager: [phone]

## 2020-03-04 NOTE — TELEPHONE ENCOUNTER
Anemia Management Note - Enrollment  SUBJECTIVE/OBJECTIVE:    Referred by Dr. Eloy Rao on 3/2/2020  Primary Diagnosis: Anemia in Chronic Kidney Disease (N18.4, D63.1)     Secondary Diagnosis:  Chronic Kidney Disease, Stage 4 (N18.4)    Liver Transplant: 2019  Hgb goal range:  10-11.5 per Dr. Rao's referral  Epo/Darbo: TBD - likely Aranesp at  0.45mcg/kg every 2 weeks  Iron regimen:  Ferrous Sulfate 325mg once daily - was taking three times daily, decreased in   Labs : Hemoglobin - has standing order for CBC/platelets twice weekly - expires 2020  Iron Labs: 3/3/2021  Recent IVELISSE use, transfusion, IV iron: PRBC on 3/3  RX/TX plans : TBD  No history of stroke, MI and blood clots.  History of hepatocellular carcinoma - s/p TACE 2019 and liver transplant 2019  Contact:  Ok to leave message regarding scheduling, medical, and billing per consent to communicate dated 10/2/19    OK to speak with Davi Saunders (friend/POA) regarding scheduling, medical, and billing per consent to communicate dated 10/2/19    Anemia Latest Ref Rng & Units 2/10/2020 2020 2020 2020 3/2/2020 3/2/2020 3/3/2020   Hemoglobin 13.3 - 17.7 g/dL 8.6(L) 8.1(L) 7.5(L) 7.1(L) 7.6(L) 6.6(LL) -   TSAT 15 - 46 % - - - - - - -   Ferritin 26 - 388 ng/mL - - - - - - -   PRBCs - - - - - - - 1 unit       BP Readings from Last 3 Encounters:   20 113/65   20 124/72   20 108/62     Wt Readings from Last 2 Encounters:   20 158 lb 9.6 oz (71.9 kg)   20 164 lb 12.8 oz (74.8 kg)     Current Outpatient Medications   Medication Sig Dispense Refill     Acetaminophen (TYLENOL) 325 MG CAPS Take 325-650 mg by mouth every 4 hours as needed (pain, fever) 100 capsule 3     alpha-lipoic acid 600 MG capsule Take 1 capsule (600 mg) by mouth daily 90 capsule 3     Artificial Tear Solution (SM ARTIFICIAL TEARS) SOLN Place 1 drop into the right eye every hour as needed (dry eyes) Apply at least  4 times daily and as needed for dry eye 1 Bottle 3     aspirin 81 MG EC tablet Take 1 tablet (81 mg) by mouth daily       BD VIKTORIA U/F 32G X 4 MM insulin pen needle Use 5 per day 300 each 3     blood glucose (ACCU-CHEK EDINSON PLUS) test strip USE TO TEST FOUR TIMES DAILY OR AS DIRECTED 400 strip 6     blood glucose monitoring (ACCU-CHEK EDINSON PLUS) test strip Use to test blood sugar 4 times daily 400 each 3     blood glucose monitoring (ACCU-CHEK FASTCLIX) lancets Use to test blood sugar 4 times daily or as directed.  1 box = 102 lancets 408 each 3     econazole nitrate 1 % external cream Apply topically daily To feet and toenails. 85 g 0     ferrous sulfate (FEROSUL) 325 (65 Fe) MG tablet Take 1 tablet (325 mg) by mouth daily (with breakfast) 90 tablet 3     Glucagon 3 MG/DOSE POWD Spray 1 Dose in nostril as needed (for low blood sugar with sedation or emesis (unable to ingest glucose)) 1 each 1     glucose (BD GLUCOSE) 4 g chewable tablet Take 1 tablet by mouth every hour as needed for low blood sugar 60 tablet 1     insulin aspart (NOVOLOG PEN) 100 UNIT/ML pen Inject 1 unit : 8 grams carb + sliding scale 4 times daily Max units per day   80 units daily . 75 mL 3     insulin aspart (NOVOLOG PEN) 100 UNIT/ML pen Give with meals and snacks  Do Not give Correction Insulin if Pre-Meal BG less than 140.   For Pre-Meal  - 189 give 1 unit.   For Pre-Meal  - 239 give 2 units.   For Pre-Meal  - 289 give 3 units.   For Pre-Meal  - 339 give 4 units.   For Pre-Meal - 399 give 5 units.   For Pre-Meal -449 give 6 units  For Pre-Meal BG greater than or equal to 450 give 7 units.   To be given with prandial insulin, and based on pre-meal blood glucose.   If given at mealtime, administer within 30 minutes of start of meal 3 mL 1     insulin glargine (BASAGLAR KWIKPEN) 100 UNIT/ML pen Inject 28 Units Subcutaneous daily 30 mL 3     lamiVUDine (EPIVIR) 100 MG tablet Take 1 tablet (100 mg) by mouth  daily 30 tablet 3     magnesium oxide (MAG-OX) 400 MG tablet Take 1 tablet (400 mg) by mouth daily 60 tablet 0     melatonin 5 MG tablet Take 5 mg by mouth nightly as needed for sleep Take at 6 pm every night       Multiple Vitamin (THERAVITE PO) Take 1 tablet by mouth every morning        omeprazole (PRILOSEC) 40 MG DR capsule Take 1 capsule (40 mg) by mouth daily 90 capsule 2     rosuvastatin (CRESTOR) 5 MG tablet Take 1 tablet (5 mg) by mouth daily 30 tablet 3     sertraline (ZOLOFT) 50 MG tablet Take 1 tablet (50 mg) by mouth daily 90 tablet 0     sulfamethoxazole-trimethoprim (BACTRIM/SEPTRA) 400-80 MG tablet Take 1 tablet by mouth twice a week Monday and Thursday only 30 tablet 11     tacrolimus (GENERIC EQUIVALENT) 1 MG capsule Take 2 capsules (2 mg) by mouth every morning AND 1 capsule (1 mg) every evening. 90 capsule 0     tamsulosin (FLOMAX) 0.4 MG capsule TAKE 1 CAPSULE(0.4 MG) BY MOUTH DAILY 90 capsule 3     valGANciclovir (VALCYTE) 450 MG tablet Take 1 tablet (450 mg) by mouth every other day 15 tablet 1     vitamin B-12 (CYANOCOBALAMIN) 1000 MCG tablet Take 1 tablet (1,000 mcg) by mouth daily 30 tablet 0     vitamin D3 (CHOLECALCIFEROL) 2000 units (50 mcg) tablet Take 1 tablet (2,000 Units) by mouth daily 30 tablet 0     Pt is on Homecare through [facility] until 3/16/20 - once weekly visits on Mondays.  Then he will have labs completed at the [facility].    ASSESSMENT:  Hgb Not at goal/Initiation of therapy   Ferritin: Due for labs  TSat: Due for labs  Iron regimen recommended: Continue daily ferrous sulfate  Recommended IVELISSE regimen to initiate when Hgb < 10 - awaiting iron lab results, then will proceed with ordering aranesp  Blood Pressure: Stable - Nephrology prefers higher goal at this time for better renal perfusion     PLAN:  1. Patient called today for enrollment in Anemia Management Service.  2. Discussed:  anemia overview, monitoring service and goal hemoglobin range and rationale and risks of IVELISSE  blood clots, stroke and increase in blood pressure  3. Dose location: in clinic at [facility]  4. Labs: [facility] until 3/16/20, then done at [facility]  5. Pharmacy: n/a  6. Sent new patient letter with medication guide to patient.     Patient verbalized understanding of the plan.     Next call date:  3/10/20    Anemia Services  Vini Dexter PharmD [facility]  Phone: [phone]  Fax: [phone]     [facility]

## 2020-03-04 NOTE — NURSING NOTE
Chief Complaints and History of Present Illnesses   Patient presents with     Diabetic Eye Exam Follow Up     5 week follow up both eyes.       Chief Complaint(s) and History of Present Illness(es)     Diabetic Eye Exam Follow Up     Comments: 5 week follow up both eyes.                Comments     Pressure like eye pain in LE over the past 2 weeks. Pain lasts about 30 minutes before going away.  No eye pain today. No new flashes or floaters.  DM2 BS: 126 this morning.  Lab Results       Component                Value               Date                  A1C: 4.8 this morning, but pt states that value is higher than that.       A1C                      6.6                 08/08/2018                 A1C                      6.5                 06/09/2017                 A1C                      7.8                 10/25/2016              LEX Arceo March 4, 2020 12:46 PM

## 2020-03-04 NOTE — PATIENT INSTRUCTIONS
Continue with Lants 28 units daily.     Change mealtime insulin to 1 unit for every 10 grams of carbohydrates (instead of 1 unit for every 8 grams of carbohydrates)     Please follow this correction scale three times with meals and bedtime  Don't give additional insulin if blood sugar less than 140.   For Pre-Meal  - 189 give 1 unit.   For Pre-Meal  - 239 give 2 units.   For Pre-Meal  - 289 give 3 units.   For Pre-Meal  - 339 give 4 units.   For Pre-Meal BG = or > 340 give 5 units.     If you get low blood sugars below 70 on more than two occasions

## 2020-03-04 NOTE — PROGRESS NOTES
Transplant Social Work Services Progress Note    Collaborated with: Maximo Tucker DO    Data: Miller is almost four months post  donor liver transplantation.     Intervention: I met with Miller and provided adjustment to illness counseling.    Assessment: Miller continues to make progress in the right direction, though some of his progress is limited by anemia.  He continues to ambulate with a cane, but did walk upwards of a mile a few days ago.  Miller has begun to drive locally, and uses Uber for rides to his medical appointments here.  He is hopeful to be granted permission to drive a further distance the end of this month.  Miller is setting realistic goals about his recovery, and he is hopeful about his recovery and future.      Miller continues to have four hours of PCA services daily.  His friend Art visits monthly for a weekend.  Art was just here this past weekend and they enjoyed going to a movie and a trip to a casino.   Education provided by SW: Writing to the Donor Family, Liver Transplant Support Group  Plan: I will remain available for the psychosocial needs of this patient.        ALEXYS Mcnair, [facility]  Liver Transplant   Phone [phone]  Pager [phone]

## 2020-03-05 NOTE — TELEPHONE ENCOUNTER
Labs faxed to [facility].     Jie Blum RN   Anemia Services  [facility]  [address] [place] [id number]   [email or web address]   Office : [phone]  Fax: [phone]

## 2020-03-09 ENCOUNTER — MEDICAL CORRESPONDENCE (OUTPATIENT)
Dept: HEALTH INFORMATION MANAGEMENT | Facility: CLINIC | Age: 55
End: 2020-03-09

## 2020-03-09 ENCOUNTER — TELEPHONE (OUTPATIENT)
Dept: TRANSPLANT | Facility: CLINIC | Age: 55
End: 2020-03-09

## 2020-03-09 LAB
ALBUMIN SERPL-MCNC: 4.3 G/DL (ref 3.4–5)
ALP SERPL-CCNC: 142 U/L (ref 40–150)
ALT SERPL W P-5'-P-CCNC: 19 U/L (ref 0–70)
ANION GAP SERPL CALCULATED.3IONS-SCNC: 6 MMOL/L (ref 3–14)
AST SERPL W P-5'-P-CCNC: 13 U/L (ref 0–45)
BILIRUB DIRECT SERPL-MCNC: <0.1 MG/DL (ref 0–0.2)
BILIRUB SERPL-MCNC: 0.4 MG/DL (ref 0.2–1.3)
BUN SERPL-MCNC: 56 MG/DL (ref 7–30)
CALCIUM SERPL-MCNC: 9.4 MG/DL (ref 8.5–10.1)
CHLORIDE SERPL-SCNC: 115 MMOL/L (ref 94–109)
CO2 SERPL-SCNC: 20 MMOL/L (ref 20–32)
CREAT SERPL-MCNC: 1.74 MG/DL (ref 0.66–1.25)
ERYTHROCYTE [DISTWIDTH] IN BLOOD BY AUTOMATED COUNT: 24.8 % (ref 10–15)
FERRITIN SERPL-MCNC: 859 NG/ML (ref 26–388)
GFR SERPL CREATININE-BSD FRML MDRD: 43 ML/MIN/{1.73_M2}
GLUCOSE SERPL-MCNC: 175 MG/DL (ref 70–99)
HCT VFR BLD AUTO: 26.1 % (ref 40–53)
HGB BLD-MCNC: 8.8 G/DL (ref 13.3–17.7)
IRON SATN MFR SERPL: 40 % (ref 15–46)
IRON SERPL-MCNC: 92 UG/DL (ref 35–180)
MAGNESIUM SERPL-MCNC: 2.2 MG/DL (ref 1.6–2.3)
MCH RBC QN AUTO: 33.6 PG (ref 26.5–33)
MCHC RBC AUTO-ENTMCNC: 33.7 G/DL (ref 31.5–36.5)
MCV RBC AUTO: 100 FL (ref 78–100)
PHOSPHATE SERPL-MCNC: 4 MG/DL (ref 2.5–4.5)
PLATELET # BLD AUTO: 84 10E9/L (ref 150–450)
POTASSIUM SERPL-SCNC: 5.1 MMOL/L (ref 3.4–5.3)
PROT SERPL-MCNC: 7.8 G/DL (ref 6.8–8.8)
RBC # BLD AUTO: 2.62 10E12/L (ref 4.4–5.9)
SODIUM SERPL-SCNC: 141 MMOL/L (ref 133–144)
TACROLIMUS BLD-MCNC: 14 UG/L (ref 5–15)
TIBC SERPL-MCNC: 231 UG/DL (ref 240–430)
TME LAST DOSE: NORMAL H
WBC # BLD AUTO: 2.9 10E9/L (ref 4–11)

## 2020-03-09 PROCEDURE — 80076 HEPATIC FUNCTION PANEL: CPT | Performed by: SURGERY

## 2020-03-09 PROCEDURE — 83540 ASSAY OF IRON: CPT | Performed by: SURGERY

## 2020-03-09 PROCEDURE — 83735 ASSAY OF MAGNESIUM: CPT | Performed by: SURGERY

## 2020-03-09 PROCEDURE — 85027 COMPLETE CBC AUTOMATED: CPT | Performed by: SURGERY

## 2020-03-09 PROCEDURE — 82728 ASSAY OF FERRITIN: CPT | Performed by: SURGERY

## 2020-03-09 PROCEDURE — 80197 ASSAY OF TACROLIMUS: CPT | Performed by: SURGERY

## 2020-03-09 PROCEDURE — 83550 IRON BINDING TEST: CPT | Performed by: SURGERY

## 2020-03-09 PROCEDURE — 80048 BASIC METABOLIC PNL TOTAL CA: CPT | Performed by: SURGERY

## 2020-03-09 PROCEDURE — 84100 ASSAY OF PHOSPHORUS: CPT | Performed by: SURGERY

## 2020-03-09 NOTE — TELEPHONE ENCOUNTER
Provider Call: Home Care  Route to N  Facility Name: [facility]  Facility Location: [place]  Reason for Call: would like to discuss discharging and lab location etc  Callback needed? Yes    Return Call Needed  Same as documented in contacts section  When to return call?: Same day: Route High Priority

## 2020-03-09 NOTE — TELEPHONE ENCOUNTER
Spoke to Chichi.  Asked her to encourage Miller to drink plenty of fluids.  His last potassium was 5.6, also encouraged her to talk w/ him about a low potassium diet.

## 2020-03-10 ENCOUNTER — HEALTH MAINTENANCE LETTER (OUTPATIENT)
Age: 55
End: 2020-03-10

## 2020-03-10 ENCOUNTER — TELEPHONE (OUTPATIENT)
Dept: PHARMACY | Facility: CLINIC | Age: 55
End: 2020-03-10

## 2020-03-10 DIAGNOSIS — N18.4 ANEMIA OF CHRONIC RENAL FAILURE, STAGE 4 (SEVERE) (H): ICD-10-CM

## 2020-03-10 DIAGNOSIS — Z94.4 LIVER TRANSPLANT RECIPIENT (H): ICD-10-CM

## 2020-03-10 DIAGNOSIS — Z94.4 LIVER TRANSPLANT RECIPIENT (H): Primary | ICD-10-CM

## 2020-03-10 DIAGNOSIS — N18.4 CKD (CHRONIC KIDNEY DISEASE) STAGE 4, GFR 15-29 ML/MIN (H): ICD-10-CM

## 2020-03-10 DIAGNOSIS — D63.1 ANEMIA OF CHRONIC RENAL FAILURE, STAGE 4 (SEVERE) (H): ICD-10-CM

## 2020-03-10 RX ORDER — TACROLIMUS 1 MG/1
4 CAPSULE ORAL EVERY 12 HOURS
Qty: 240 CAPSULE | Refills: 11 | Status: SHIPPED | OUTPATIENT
Start: 2020-03-10 | End: 2020-06-30

## 2020-03-10 NOTE — TELEPHONE ENCOUNTER
ISSUE:   Tacrolimus IR level 14 on 3/9 , goal 6-8, dose 5 mg BID.    PLAN:   Please call patient and confirm this was an accurate 12-hour trough. Verify Tacrolimus IR dose. Confirm no new medications or illness. Confirm no missed doses. If accurate trough and accurate dose, decrease Tacrolimus IR dose to 4 mg BID and repeat labs in 1 week.  Chelita Novak RN    OUTCOME:   Spoke with patient, they confirm accurate trough level and current dose 5 mg BID. Patient confirmed dose change to 4 mg BID and to repeat labs in 1 weeks. Orders sent to preferred pharmacy for dose change and lab for repeat labs. Patient voiced understanding of plan.     Deena Marshall LPN     Order faxed

## 2020-03-10 NOTE — TELEPHONE ENCOUNTER
Anemia Management Note  SUBJECTIVE/OBJECTIVE:  Referred by Dr. Eloy Rao on 3/2/2020  Primary Diagnosis: Anemia in Chronic Kidney Disease (N18.4, D63.1)     Secondary Diagnosis:  Chronic Kidney Disease, Stage 4 (N18.4)                 Liver Transplant: 2019  Hgb goal range:  10-11.5 per Dr. Rao's referral  Epo/Darbo: Aranesp 40mcg every 14 days for Hgb <11.5.  In Clinic.  Iron regimen:  Ferrous Sulfate 325mg once daily - was taking three times daily, decreased in   Labs : Hemoglobin - has standing order for CBC/platelets twice weekly - expires 2020  Iron Labs: 3/3/2021  Recent IVELISSE use, transfusion, IV iron: PRBC on 3/3  RX/TX plans : TBD  No history of stroke, MI and blood clots.  History of hepatocellular carcinoma - s/p TACE 2019 and liver transplant 2019    Contact:            Ok to leave message regarding scheduling, medical, and billing per consent to communicate dated 10/2/19                              OK to speak with Davi Saunders (friend/POA) regarding scheduling, medical, and billing per consent to communicate dated 10/2/19    Anemia Latest Ref Rng & Units 2020 2020 2020 3/2/2020 3/2/2020 3/3/2020 3/9/2020   Hemoglobin 13.3 - 17.7 g/dL 8.1(L) 7.5(L) 7.1(L) 7.6(L) 6.6(LL) - 8.8(L)   TSAT 15 - 46 % - - - - - - 40   Ferritin 26 - 388 ng/mL - - - - - - 859(H)   PRBCs - - - - - - 1 unit -     BP Readings from Last 3 Encounters:   20 137/81   20 113/65   20 124/72     Wt Readings from Last 2 Encounters:   20 72.3 kg (159 lb 8 oz)   20 71.9 kg (158 lb 9.6 oz)       Orders placed for Aranesp 40mcg every 14 days. LVM for Miller. Does he want to get started on the Aranesp and where would he like to go?    Miller called back; he does not want to start the Aranesp at this time d/t possible side effects.  He would like to have labs checked again next Monday and then decide on a plan.         ASSESSMENT:  Hgb:Not at goal but  improving.  TSat: at goal >30% Ferritin: At goal (>100ng/mL)    PLAN:  RTC for Hgb in 1 week(s)    Orders needed to be renewed (for next follow-up date) in LETTERS - for external labs: None    Iron labs due:  4 week    Plan discussed with:  Miller  Plan provided by:  adri    NEXT FOLLOW-UP DATE:  03/17/2020    Jie Blum RN   Anemia Services  [address] [address]   [email or web address]   Office : [phone]  Fax: [phone]

## 2020-03-12 LAB
FUNGUS SPEC CULT: ABNORMAL
FUNGUS SPEC CULT: ABNORMAL
SPECIMEN SOURCE: ABNORMAL

## 2020-03-17 ENCOUNTER — TELEPHONE (OUTPATIENT)
Dept: PHARMACY | Facility: CLINIC | Age: 55
End: 2020-03-17

## 2020-03-17 NOTE — TELEPHONE ENCOUNTER
Follow-up with anemia management service:    Lab will be drawn by his home care nurse tomorrow morning tomorrow.     Anemia Latest Ref Rng & Units 2/14/2020 2/18/2020 2/24/2020 3/2/2020 3/2/2020 3/3/2020 3/9/2020   Hemoglobin 13.3 - 17.7 g/dL 8.1(L) 7.5(L) 7.1(L) 7.6(L) 6.6(LL) - 8.8(L)   TSAT 15 - 46 % - - - - - - 40   Ferritin 26 - 388 ng/mL - - - - - - 859(H)   PRBCs - - - - - - 1 unit -         Follow-up call date: 3/19/2020    Jie Blum RN   Anemia Services  [address] [address]   [email or web address]   Office : [phone]  Fax: [phone]

## 2020-03-18 ENCOUNTER — TELEPHONE (OUTPATIENT)
Dept: FAMILY MEDICINE | Facility: OTHER | Age: 55
End: 2020-03-18

## 2020-03-18 NOTE — TELEPHONE ENCOUNTER
Reason for Call:  Other appointment    Detailed comments: Patient called clinic with home care nurse, trying to come to clinic to have labs done at our location because it is the closest to his home. Patient is positive for cough x 1 month. Please triage.     Phone Number Patient can be reached at: Home number on file [phone] (home)    Best Time: any    Can we leave a detailed message on this number? YES    Call taken on 3/18/2020 at 8:58 AM by Itzel Acosta

## 2020-03-18 NOTE — TELEPHONE ENCOUNTER
Spoke with patient.  Has orders for labs as his is a liver transplant patient and would like to schedule for Thursday.  Provider Moe.    He does have a cough that he has had for over a month and it has not changed.  Seems to be more allergy related and worse when laying down at night.  No fever.     Spoke with nursing supervisor.  He is ok to be scheduled at this time. Do to no new symtpoms.    Saleem Partida, RN, BSN

## 2020-03-19 ENCOUNTER — MEDICAL CORRESPONDENCE (OUTPATIENT)
Dept: HEALTH INFORMATION MANAGEMENT | Facility: CLINIC | Age: 55
End: 2020-03-19

## 2020-03-19 ENCOUNTER — TELEPHONE (OUTPATIENT)
Dept: PHARMACY | Facility: CLINIC | Age: 55
End: 2020-03-19

## 2020-03-19 DIAGNOSIS — Z13.220 LIPID SCREENING: ICD-10-CM

## 2020-03-19 DIAGNOSIS — Z94.4 LIVER REPLACED BY TRANSPLANT (H): ICD-10-CM

## 2020-03-19 LAB
ALBUMIN SERPL-MCNC: 4 G/DL (ref 3.4–5)
ALP SERPL-CCNC: 129 U/L (ref 40–150)
ALT SERPL W P-5'-P-CCNC: 20 U/L (ref 0–70)
ANION GAP SERPL CALCULATED.3IONS-SCNC: 5 MMOL/L (ref 3–14)
AST SERPL W P-5'-P-CCNC: 14 U/L (ref 0–45)
BILIRUB DIRECT SERPL-MCNC: <0.1 MG/DL (ref 0–0.2)
BILIRUB SERPL-MCNC: 0.4 MG/DL (ref 0.2–1.3)
BUN SERPL-MCNC: 36 MG/DL (ref 7–30)
CALCIUM SERPL-MCNC: 9.1 MG/DL (ref 8.5–10.1)
CHLORIDE SERPL-SCNC: 115 MMOL/L (ref 94–109)
CO2 SERPL-SCNC: 23 MMOL/L (ref 20–32)
CREAT SERPL-MCNC: 1.53 MG/DL (ref 0.66–1.25)
ERYTHROCYTE [DISTWIDTH] IN BLOOD BY AUTOMATED COUNT: 23.4 % (ref 10–15)
GFR SERPL CREATININE-BSD FRML MDRD: 50 ML/MIN/{1.73_M2}
GLUCOSE SERPL-MCNC: 186 MG/DL (ref 70–99)
HCT VFR BLD AUTO: 26.2 % (ref 40–53)
HGB BLD-MCNC: 8.7 G/DL (ref 13.3–17.7)
MAGNESIUM SERPL-MCNC: 1.8 MG/DL (ref 1.6–2.3)
MCH RBC QN AUTO: 34.1 PG (ref 26.5–33)
MCHC RBC AUTO-ENTMCNC: 33.2 G/DL (ref 31.5–36.5)
MCV RBC AUTO: 103 FL (ref 78–100)
PHOSPHATE SERPL-MCNC: 3.8 MG/DL (ref 2.5–4.5)
PLATELET # BLD AUTO: 126 10E9/L (ref 150–450)
POTASSIUM SERPL-SCNC: 5.2 MMOL/L (ref 3.4–5.3)
PROT SERPL-MCNC: 7.6 G/DL (ref 6.8–8.8)
RBC # BLD AUTO: 2.55 10E12/L (ref 4.4–5.9)
SODIUM SERPL-SCNC: 143 MMOL/L (ref 133–144)
TACROLIMUS BLD-MCNC: 7.8 UG/L (ref 5–15)
TME LAST DOSE: NORMAL H
WBC # BLD AUTO: 3.1 10E9/L (ref 4–11)

## 2020-03-19 PROCEDURE — 80076 HEPATIC FUNCTION PANEL: CPT | Performed by: SURGERY

## 2020-03-19 PROCEDURE — 36415 COLL VENOUS BLD VENIPUNCTURE: CPT | Performed by: SURGERY

## 2020-03-19 PROCEDURE — 80048 BASIC METABOLIC PNL TOTAL CA: CPT | Performed by: SURGERY

## 2020-03-19 PROCEDURE — 80197 ASSAY OF TACROLIMUS: CPT | Performed by: SURGERY

## 2020-03-19 PROCEDURE — 85027 COMPLETE CBC AUTOMATED: CPT | Performed by: SURGERY

## 2020-03-19 PROCEDURE — 84100 ASSAY OF PHOSPHORUS: CPT | Performed by: SURGERY

## 2020-03-19 PROCEDURE — 83735 ASSAY OF MAGNESIUM: CPT | Performed by: SURGERY

## 2020-03-19 NOTE — TELEPHONE ENCOUNTER
Anemia Management Note  SUBJECTIVE/OBJECTIVE:  Referred by Dr. Eloy Rao on 3/2/2020  Primary Diagnosis: Anemia in Chronic Kidney Disease (N18.4, D63.1)     Secondary Diagnosis:  Chronic Kidney Disease, Stage 4 (N18.4)                 Liver Transplant: 2019  Hgb goal range:  10-11.5 per Dr. Rao's referral  Epo/Darbo: Aranesp 40mcg every 14 days for Hgb <11.5.  In Clinic.  Iron regimen:  Ferrous Sulfate 325mg once daily - was taking three times daily, decreased in   Labs : Hemoglobin - has standing order for CBC/platelets twice weekly - expires 2020  Iron Labs: 3/3/2021  Recent IVELISSE use, transfusion, IV iron: PRBC on 3/3  RX/TX plans : TBD  No history of stroke, MI and blood clots.  History of hepatocellular carcinoma - s/p TACE 2019 and liver transplant 2019     Contact:            Ok to leave message regarding scheduling, medical, and billing per consent to communicate dated 10/2/19                              OK to speak with Davi Saunders (friend/POA) regarding scheduling, medical, and billing per consent to communicate dated 10/2/19    Anemia Latest Ref Rng & Units 2020 2020 3/2/2020 3/2/2020 3/3/2020 3/9/2020 3/19/2020   Hemoglobin 13.3 - 17.7 g/dL 7.5(L) 7.1(L) 7.6(L) 6.6(LL) - 8.8(L) 8.7(L)   TSAT 15 - 46 % - - - - - 40 -   Ferritin 26 - 388 ng/mL - - - - - 859(H) -   PRBCs - - - - - 1 unit - -     BP Readings from Last 3 Encounters:   20 137/81   20 113/65   20 124/72     Wt Readings from Last 2 Encounters:   20 72.3 kg (159 lb 8 oz)   20 71.9 kg (158 lb 9.6 oz)       Spoke with Miller, he wants to get his Aranesp. He will call  Infusion Center.     ASSESSMENT:  Hgb:Not at goal/Initiation of therapy  TSat: at goal >30% Ferritin: At goal (>100ng/mL)    PLAN:  Dose with aranesp and RTC for hgb then aranesp if needed in 2 week(s)    Orders needed to be renewed (for next follow-up date) in EPIC: None    Iron labs due:   4/6/2020    Plan discussed with:  Miller  Plan provided by:  adri    NEXT FOLLOW-UP DATE:  3/24/2020    Jie Blum RN   Anemia Services  [address] [address]   [email or web address]   Office : [phone]  Fax: [phone]

## 2020-03-23 NOTE — OR NURSING
EGD with esophageal brushing completed.  Pt tolerated well with conscious sedation.     
No significant past surgical history

## 2020-03-25 ENCOUNTER — TELEPHONE (OUTPATIENT)
Dept: PHARMACY | Facility: CLINIC | Age: 55
End: 2020-03-25

## 2020-03-25 ENCOUNTER — INFUSION THERAPY VISIT (OUTPATIENT)
Dept: INFUSION THERAPY | Facility: CLINIC | Age: 55
End: 2020-03-25
Payer: MEDICARE

## 2020-03-25 VITALS
SYSTOLIC BLOOD PRESSURE: 136 MMHG | DIASTOLIC BLOOD PRESSURE: 77 MMHG | RESPIRATION RATE: 16 BRPM | BODY MASS INDEX: 24.13 KG/M2 | HEART RATE: 82 BPM | TEMPERATURE: 97 F | WEIGHT: 163.4 LBS | OXYGEN SATURATION: 96 %

## 2020-03-25 DIAGNOSIS — N18.4 CKD (CHRONIC KIDNEY DISEASE) STAGE 4, GFR 15-29 ML/MIN (H): Primary | ICD-10-CM

## 2020-03-25 DIAGNOSIS — D63.1 ANEMIA OF CHRONIC RENAL FAILURE, STAGE 4 (SEVERE) (H): ICD-10-CM

## 2020-03-25 DIAGNOSIS — N18.4 ANEMIA OF CHRONIC RENAL FAILURE, STAGE 4 (SEVERE) (H): ICD-10-CM

## 2020-03-25 LAB
HCT VFR BLD AUTO: 25.9 % (ref 40–53)
HGB BLD-MCNC: 8.6 G/DL (ref 13.3–17.7)

## 2020-03-25 PROCEDURE — 85018 HEMOGLOBIN: CPT | Performed by: INTERNAL MEDICINE

## 2020-03-25 PROCEDURE — 96372 THER/PROPH/DIAG INJ SC/IM: CPT | Performed by: NURSE PRACTITIONER

## 2020-03-25 PROCEDURE — 36415 COLL VENOUS BLD VENIPUNCTURE: CPT | Performed by: INTERNAL MEDICINE

## 2020-03-25 PROCEDURE — 99207 ZZC NO CHARGE LOS: CPT

## 2020-03-25 PROCEDURE — 85014 HEMATOCRIT: CPT | Performed by: INTERNAL MEDICINE

## 2020-03-25 NOTE — PROGRESS NOTES
Infusion Nursing Note:  Frandy Workman presents today for Aranesp.    Patient seen by provider today: No   present during visit today: Not Applicable.    Note: N/A.    Intravenous Access:  No Intravenous access/labs at this visit.    Treatment Conditions:  Results reviewed, labs MET treatment parameters, ok to proceed with treatment.      Post Infusion Assessment:  Patient tolerated injection without incident.       Discharge Plan:   RTC in 2 wks for lab/injection    JAZ GONCALVES RN

## 2020-03-25 NOTE — TELEPHONE ENCOUNTER
Anemia Management Note  SUBJECTIVE/OBJECTIVE:  Referred by Dr. Eloy Rao on 3/2/2020  Primary Diagnosis: Anemia in Chronic Kidney Disease (N18.4, D63.1)     Secondary Diagnosis:  Chronic Kidney Disease, Stage 4 (N18.4)                 Liver Transplant: 2019  Hgb goal range:  10-11.5 per Dr. Rao's referral  Epo/Darbo: Aranesp 40mcg every 14 days for Hgb <11.5.  In Clinic.  Iron regimen:  Ferrous Sulfate 325mg once daily - was taking three times daily, decreased in   Labs : Hemoglobin - has standing order for CBC/platelets twice weekly - expires 2020  Iron Labs: 3/3/2021  Recent IVELISSE use, transfusion, IV iron: PRBC on 3/3  RX/TX plans : TBD  No history of stroke, MI and blood clots.  History of hepatocellular carcinoma - s/p TACE 2019 and liver transplant 2019     Contact:            Ok to leave message regarding scheduling, medical, and billing per consent to communicate dated 10/2/19                              OK to speak with Davi Saunders (friend/POA) regarding scheduling, medical, and billing per consent to communicate dated 10/2/19    Anemia Latest Ref Rng & Units 2020 3/2/2020 3/2/2020 3/3/2020 3/9/2020 3/19/2020 3/25/2020   IVELISSE Dose - - - - - - - 40mcg   Hemoglobin 13.3 - 17.7 g/dL 7.1(L) 7.6(L) 6.6(LL) - 8.8(L) 8.7(L) 8.6(L)   TSAT 15 - 46 % - - - - 40 - -   Ferritin 26 - 388 ng/mL - - - - 859(H) - -   PRBCs - - - - 1 unit - - -     BP Readings from Last 3 Encounters:   20 136/77   20 137/81   20 113/65     Wt Readings from Last 2 Encounters:   20 74.1 kg (163 lb 6.4 oz)   20 72.3 kg (159 lb 8 oz)           ASSESSMENT:  Hgb:Not at goal/Initiation of therapy  TSat: at goal >30% Ferritin: At goal (>100ng/mL)    PLAN:  Dose with aranesp and RTC for hgb then aranesp if needed in 2 week(s)    Orders needed to be renewed (for next follow-up date) in EPIC: None    Iron labs due:  2020    Plan discussed with:  No call made, appt  sched for 4/8/20  Plan provided by:  adri    NEXT FOLLOW-UP DATE:  4/8/2020    Jie Blum RN   Anemia Services  [address] [address]   [email or web address]   Office : [phone]  Fax: [phone]

## 2020-03-30 DIAGNOSIS — Z94.4 LIVER TRANSPLANT RECIPIENT (H): ICD-10-CM

## 2020-03-30 RX ORDER — CHOLECALCIFEROL (VITAMIN D3) 50 MCG
2000 TABLET ORAL DAILY
Qty: 90 TABLET | Refills: 3 | Status: SHIPPED | OUTPATIENT
Start: 2020-03-30 | End: 2021-04-19

## 2020-04-02 ENCOUNTER — TELEPHONE (OUTPATIENT)
Dept: TRANSPLANT | Facility: CLINIC | Age: 55
End: 2020-04-02

## 2020-04-02 NOTE — TELEPHONE ENCOUNTER
Pt called to report that he nicked himself shaving today and he didn't realize it. He went for a 2500 step walk and when he got home he washed his face and realized he was bleeding. Patient does not feel its a deep cut. Patient instructed to hold pressure on area for 10 mins and if it still continues to bleed then he needs to call back. Patient reports that he will comply.

## 2020-04-03 ENCOUNTER — TELEPHONE (OUTPATIENT)
Dept: GASTROENTEROLOGY | Facility: CLINIC | Age: 55
End: 2020-04-03

## 2020-04-03 NOTE — TELEPHONE ENCOUNTER
Spoke with patient's caregiver, Art. Pt missed his tacrolimus last night by accident. He is back on track today.

## 2020-04-03 NOTE — TELEPHONE ENCOUNTER
[facility] Call Center    Phone Message    May a detailed message be left on voicemail: yes     Reason for Call: Other: Pt calling in to discuss his INR order for [facility], his My Chart not allowing him to send provider messaages, and also that he had cut himself shaving, but it stopped bleeding. PLease follow up with pt when available. Thank you      Action Taken: Message routed to:  [facility] Nephrology    Travel Screening: Not Applicable

## 2020-04-03 NOTE — TELEPHONE ENCOUNTER
Patient Call: General  Route to LPN    Reason for call: Pt wanted to let Coordinator know the bleeding stopped-  everything was OK during the night  He will contact anemia clinic when they open today and give them updates     Call back needed? No

## 2020-04-03 NOTE — TELEPHONE ENCOUNTER
Care giver called regarding the incident last night that the patient cut himself shaving and would like to touch base with coordinator.

## 2020-04-07 ENCOUNTER — TELEPHONE (OUTPATIENT)
Dept: TRANSPLANT | Facility: CLINIC | Age: 55
End: 2020-04-07
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
## 2020-04-07 NOTE — TELEPHONE ENCOUNTER
Patient called stated he has lost 3LBS from yesterday and to make sure his lab orders are updated to tomorrow's lab. No call back needed if there are no concerns.

## 2020-04-08 ENCOUNTER — TELEPHONE (OUTPATIENT)
Dept: PHARMACY | Facility: CLINIC | Age: 55
End: 2020-04-08

## 2020-04-08 ENCOUNTER — INFUSION THERAPY VISIT (OUTPATIENT)
Dept: INFUSION THERAPY | Facility: CLINIC | Age: 55
End: 2020-04-08
Payer: MEDICARE

## 2020-04-08 DIAGNOSIS — N18.4 ANEMIA OF CHRONIC RENAL FAILURE, STAGE 4 (SEVERE) (H): ICD-10-CM

## 2020-04-08 DIAGNOSIS — Z94.4 LIVER REPLACED BY TRANSPLANT (H): ICD-10-CM

## 2020-04-08 DIAGNOSIS — D63.1 ANEMIA OF CHRONIC RENAL FAILURE, STAGE 4 (SEVERE) (H): ICD-10-CM

## 2020-04-08 DIAGNOSIS — N18.4 CKD (CHRONIC KIDNEY DISEASE) STAGE 4, GFR 15-29 ML/MIN (H): Primary | ICD-10-CM

## 2020-04-08 DIAGNOSIS — Z13.220 LIPID SCREENING: ICD-10-CM

## 2020-04-08 LAB
ALBUMIN SERPL-MCNC: 4.6 G/DL (ref 3.4–5)
ALP SERPL-CCNC: 147 U/L (ref 40–150)
ALT SERPL W P-5'-P-CCNC: 25 U/L (ref 0–70)
ANION GAP SERPL CALCULATED.3IONS-SCNC: 7 MMOL/L (ref 3–14)
AST SERPL W P-5'-P-CCNC: 26 U/L (ref 0–45)
BILIRUB DIRECT SERPL-MCNC: 0.2 MG/DL (ref 0–0.2)
BILIRUB SERPL-MCNC: 0.6 MG/DL (ref 0.2–1.3)
BUN SERPL-MCNC: 42 MG/DL (ref 7–30)
CALCIUM SERPL-MCNC: 9.7 MG/DL (ref 8.5–10.1)
CHLORIDE SERPL-SCNC: 104 MMOL/L (ref 94–109)
CO2 SERPL-SCNC: 25 MMOL/L (ref 20–32)
CREAT SERPL-MCNC: 1.47 MG/DL (ref 0.66–1.25)
ERYTHROCYTE [DISTWIDTH] IN BLOOD BY AUTOMATED COUNT: 17.7 % (ref 10–15)
GFR SERPL CREATININE-BSD FRML MDRD: 53 ML/MIN/{1.73_M2}
GLUCOSE SERPL-MCNC: 192 MG/DL (ref 70–99)
HCT VFR BLD AUTO: 27.1 % (ref 40–53)
HGB BLD-MCNC: 9.1 G/DL (ref 13.3–17.7)
MAGNESIUM SERPL-MCNC: 2.1 MG/DL (ref 1.6–2.3)
MCH RBC QN AUTO: 34.7 PG (ref 26.5–33)
MCHC RBC AUTO-ENTMCNC: 33.6 G/DL (ref 31.5–36.5)
MCV RBC AUTO: 103 FL (ref 78–100)
PHOSPHATE SERPL-MCNC: 3.4 MG/DL (ref 2.5–4.5)
PLATELET # BLD AUTO: 104 10E9/L (ref 150–450)
POTASSIUM SERPL-SCNC: 5.1 MMOL/L (ref 3.4–5.3)
PROT SERPL-MCNC: 8.3 G/DL (ref 6.8–8.8)
RBC # BLD AUTO: 2.62 10E12/L (ref 4.4–5.9)
SODIUM SERPL-SCNC: 136 MMOL/L (ref 133–144)
TACROLIMUS BLD-MCNC: 6.2 UG/L (ref 5–15)
TME LAST DOSE: NORMAL H
WBC # BLD AUTO: 4.7 10E9/L (ref 4–11)

## 2020-04-08 PROCEDURE — 99207 ZZC NO CHARGE NURSE ONLY: CPT

## 2020-04-08 PROCEDURE — 80076 HEPATIC FUNCTION PANEL: CPT | Performed by: SURGERY

## 2020-04-08 PROCEDURE — 80197 ASSAY OF TACROLIMUS: CPT | Performed by: SURGERY

## 2020-04-08 PROCEDURE — 96372 THER/PROPH/DIAG INJ SC/IM: CPT | Performed by: NURSE PRACTITIONER

## 2020-04-08 PROCEDURE — 36415 COLL VENOUS BLD VENIPUNCTURE: CPT | Performed by: SURGERY

## 2020-04-08 PROCEDURE — 83735 ASSAY OF MAGNESIUM: CPT | Performed by: SURGERY

## 2020-04-08 PROCEDURE — 84100 ASSAY OF PHOSPHORUS: CPT | Performed by: SURGERY

## 2020-04-08 PROCEDURE — 85027 COMPLETE CBC AUTOMATED: CPT | Performed by: SURGERY

## 2020-04-08 PROCEDURE — 80048 BASIC METABOLIC PNL TOTAL CA: CPT | Performed by: SURGERY

## 2020-04-08 NOTE — TELEPHONE ENCOUNTER
Anemia Management Note  SUBJECTIVE/OBJECTIVE:  Referred by Dr. Eloy Rao on 3/2/2020  Primary Diagnosis: Anemia in Chronic Kidney Disease (N18.4, D63.1)     Secondary Diagnosis:  Chronic Kidney Disease, Stage 4 (N18.4)                 Liver Transplant: 2019  Hgb goal range:  10-11.5 per Dr. Rao's referral  Epo/Darbo: Aranesp 40mcg every 14 days for Hgb <11.5.  In Clinic.  Iron regimen:  Ferrous Sulfate 325mg once daily - was taking three times daily, decreased in   Labs : Hemoglobin - has standing order for CBC/platelets twice weekly - expires 2020  Iron Labs: 3/3/2021  Recent IVELISSE use, transfusion, IV iron: PRBC on 3/3  RX/TX plans : TBD  No history of stroke, MI and blood clots.  History of hepatocellular carcinoma - s/p TACE 2019 and liver transplant 2019     Contact:            Ok to leave message regarding scheduling, medical, and billing per consent to communicate dated 10/2/19                              OK to speak with Davi Saunders (friend/POA) regarding scheduling, medical, and billing per consent to communicate dated 10/2/19    Anemia Latest Ref Rng & Units 3/2/2020 3/2/2020 3/3/2020 3/9/2020 3/19/2020 3/25/2020 2020   IVELISSE Dose - - - - - - 40mcg 40mcg   Hemoglobin 13.3 - 17.7 g/dL 7.6(L) 6.6(LL) - 8.8(L) 8.7(L) 8.6(L) 9.1(L)   TSAT 15 - 46 % - - - 40 - - -   Ferritin 26 - 388 ng/mL - - - 859(H) - - -   PRBCs - - - 1 unit - - - -     BP Readings from Last 3 Encounters:   20 136/77   20 137/81   20 113/65     Wt Readings from Last 2 Encounters:   20 74.1 kg (163 lb 6.4 oz)   20 72.3 kg (159 lb 8 oz)           ASSESSMENT:  Hgb:At goal - recommend dose  TSat: at goal >30% Ferritin: At goal (>100ng/mL)    PLAN:  Dose with aranesp and RTC for hgb then aranesp if needed in 2 week(s)    Orders needed to be renewed (for next follow-up date) in EPIC: None    Iron labs due:  Due    Plan discussed with:  No call made. Appt sched for  4/22/20  Plan provided by:  adri    NEXT FOLLOW-UP DATE:  4/22/20    Jie Blum RN   Anemia Services  [address] [address]   [email or web address]   Office : [phone]  Fax: [phone]

## 2020-04-08 NOTE — PROGRESS NOTES
Infusion Nursing Note:  Frandy Workman presents today for Aranesp.    Patient seen by provider today: No   present during visit today: Not Applicable.    Note: Pt c/o increased fatigue and bruising since starting aranesp, see flow sheet for assessment.    Intravenous Access:  No Intravenous access at this visit.    Treatment Conditions:  Lab Results   Component Value Date    HGB 9.1 04/08/2020     Lab Results   Component Value Date    WBC 4.7 04/08/2020      Lab Results   Component Value Date    ANEU 1.1 01/24/2020     Lab Results   Component Value Date     04/08/2020      Results reviewed, labs MET treatment parameters, ok to proceed with treatment.      Post Infusion Assessment:  Patient tolerated injection without incident.  Site patent and intact, free from redness, edema or discomfort.       Discharge Plan:   Patient discharged in stable condition accompanied by: self.  Departure Mode: Ambulatory.  Pt will RTC 4/22/20 for Aranesp.    Mitchell Daugherty RN

## 2020-04-10 ENCOUNTER — TELEPHONE (OUTPATIENT)
Dept: TRANSPLANT | Facility: CLINIC | Age: 55
End: 2020-04-10

## 2020-04-10 NOTE — TELEPHONE ENCOUNTER
Patient Call: General  Route to LPN    Reason for call: pt has personal   questions for coordinator     Call back needed? Yes    Return Call Needed  Same as documented in contacts section  When to return call?: Greater than one day: Route standard priority

## 2020-04-10 NOTE — TELEPHONE ENCOUNTER
Spoke with patient for 45 mins about his stressors and some questions that he had about his post recovery and if he can travel end of June to see his family.  Message to DR Banuelos to ensure. Patient aware of COVID19 lock down is happening that he is unable to travel.   Message to Dilan, , to reach out to patient

## 2020-04-13 ENCOUNTER — TELEPHONE (OUTPATIENT)
Dept: TRANSPLANT | Facility: CLINIC | Age: 55
End: 2020-04-13

## 2020-04-13 DIAGNOSIS — Z94.4 LIVER REPLACED BY TRANSPLANT (H): Primary | ICD-10-CM

## 2020-04-13 NOTE — TELEPHONE ENCOUNTER
"Transplant Social Work Services Phone Call      Data: Referral received from Radha Ndiaye, Liver Transplant Coordinator re: patient having personal issues and difficulty sleeping.  Intervention: I called Miller and evaluated his needs.  I made referrals to [facility]iat[facility] and [facility] Psychologist.    Assessment:  Miller reports difficulty sleeping since he had a phone conversation with his daughter Negra two weeks ago which did not go well.  Miller has had a conflicted relationship with his daughter for a number of years, and he has very infrequent contact with her.    Miller needs further mental health evaluation. He reports being diagnosed with bipolar disorder at [place] in 2015, though he feels this diagnosis was made incorrectly.  Miller is currently prescribed zoloft 50mg by Dr. Tucker.  He was prescribed trazadone for sleep in the past, but is now prescribed melatonin.  Miller also reports he was prescribed zyprexa in the past (stopped at the time of liver transplantation per patient?).   Miller was noticeably hypomanic.  He even reports he is constantly \"rambling.\"   Miller denies any thoughts of harming himself or others.    Education provided by SW: [facility] is open 24/7 for anyone in crisis, for assessment, evaluation, and care: [phone].   Plan: Miller will contact Dr. Tucker regarding his mental health.  I have also copied Dr. Tucker on this message.  Patient will be contacted about appointments with Dr. Davison and Dr. Murillo.  Miller agrees to contact the [facility] as needed.      ALEXYS Mcnair, [facility]  Liver Transplant   Phone [phone]  Pager [phone]   "

## 2020-04-20 ENCOUNTER — TELEPHONE (OUTPATIENT)
Dept: TRANSPLANT | Facility: CLINIC | Age: 55
End: 2020-04-20

## 2020-04-20 NOTE — TELEPHONE ENCOUNTER
Patient Call: General  Route to LPN    Reason for call: Please connect with pt regarding his BP and his weight, he needs an hour of the pt coordinators time.  He also took double dose of his tacrolimus   Also can would like to know if we can supply a boxes of masks as well(not sure if that is possible)        Call back needed? Yes    Return Call Needed  Same as documented in contacts section  When to return call?: Greater than one day: Route standard priority
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
## 2020-04-21 ENCOUNTER — TELEPHONE (OUTPATIENT)
Dept: TRANSPLANT | Facility: CLINIC | Age: 55
End: 2020-04-21

## 2020-04-21 NOTE — TELEPHONE ENCOUNTER
----- Message from Radha Ndiaye RN sent at 4/21/2020  1:32 PM CDT -----  Regarding: FW: Thoughts?  HI,    Can you let him know to discuss HIV and Erectile dysfunction with his PCP per DR Banuelos.    Thanks   ----- Message -----  From: Santi Rosas MD  Sent: 4/21/2020   1:29 PM CDT  To: Radha Ndiaye RN  Subject: RE: Thoughts?                                    His PCP can deal with ED and HIV testing.  He is immune to hep B.  ----- Message -----  From: Radha Ndiaye RN  Sent: 4/20/2020   5:48 PM CDT  To: Santi Dotson MD  Subject: Thoughts?                                        HI,    Patient called in a rambling over hour discussion.     He wants HIV and Hep B testing to be completed this week. He also wanted some testing that he said he spoke to you about regarding Erectile Dysfunction.     He thinks he is traveling to [place] and I asked him not to travel May 1st and he said that DR Banuelos can call him personally to discuss.    He wants his biopolar diagnosis removed. I told him he can talk with Dr Murillo regarding this diagnosis.     FYI: he has double dosed his tacro 3 times as he is not sleeping well and falling asleep in evenings and over sleeping his alarm. I did tell him he will ruin his kidneys by doing this.     Thank you  Radha

## 2020-04-22 ENCOUNTER — INFUSION THERAPY VISIT (OUTPATIENT)
Dept: INFUSION THERAPY | Facility: CLINIC | Age: 55
End: 2020-04-22
Payer: MEDICARE

## 2020-04-22 ENCOUNTER — TELEPHONE (OUTPATIENT)
Dept: PHARMACY | Facility: CLINIC | Age: 55
End: 2020-04-22

## 2020-04-22 VITALS
HEART RATE: 101 BPM | OXYGEN SATURATION: 100 % | WEIGHT: 154.3 LBS | SYSTOLIC BLOOD PRESSURE: 145 MMHG | TEMPERATURE: 98.1 F | DIASTOLIC BLOOD PRESSURE: 77 MMHG | RESPIRATION RATE: 16 BRPM | BODY MASS INDEX: 22.79 KG/M2

## 2020-04-22 DIAGNOSIS — D63.1 ANEMIA OF CHRONIC RENAL FAILURE, STAGE 4 (SEVERE) (H): ICD-10-CM

## 2020-04-22 DIAGNOSIS — N18.4 ANEMIA OF CHRONIC RENAL FAILURE, STAGE 4 (SEVERE) (H): ICD-10-CM

## 2020-04-22 DIAGNOSIS — Z94.4 LIVER REPLACED BY TRANSPLANT (H): ICD-10-CM

## 2020-04-22 DIAGNOSIS — N18.4 CKD (CHRONIC KIDNEY DISEASE) STAGE 4, GFR 15-29 ML/MIN (H): Primary | ICD-10-CM

## 2020-04-22 DIAGNOSIS — Z13.220 LIPID SCREENING: ICD-10-CM

## 2020-04-22 DIAGNOSIS — D63.1 ANEMIA DUE TO STAGE 4 CHRONIC KIDNEY DISEASE (H): ICD-10-CM

## 2020-04-22 DIAGNOSIS — N18.4 ANEMIA DUE TO STAGE 4 CHRONIC KIDNEY DISEASE (H): ICD-10-CM

## 2020-04-22 LAB
ALBUMIN SERPL-MCNC: 4.3 G/DL (ref 3.4–5)
ALP SERPL-CCNC: 130 U/L (ref 40–150)
ALT SERPL W P-5'-P-CCNC: 19 U/L (ref 0–70)
ANION GAP SERPL CALCULATED.3IONS-SCNC: 5 MMOL/L (ref 3–14)
AST SERPL W P-5'-P-CCNC: 14 U/L (ref 0–45)
BILIRUB DIRECT SERPL-MCNC: <0.1 MG/DL (ref 0–0.2)
BILIRUB SERPL-MCNC: 0.4 MG/DL (ref 0.2–1.3)
BUN SERPL-MCNC: 42 MG/DL (ref 7–30)
CALCIUM SERPL-MCNC: 9.5 MG/DL (ref 8.5–10.1)
CHLORIDE SERPL-SCNC: 109 MMOL/L (ref 94–109)
CO2 SERPL-SCNC: 25 MMOL/L (ref 20–32)
CREAT SERPL-MCNC: 1.58 MG/DL (ref 0.66–1.25)
ERYTHROCYTE [DISTWIDTH] IN BLOOD BY AUTOMATED COUNT: 16 % (ref 10–15)
FERRITIN SERPL-MCNC: 643 NG/ML (ref 26–388)
GFR SERPL CREATININE-BSD FRML MDRD: 48 ML/MIN/{1.73_M2}
GLUCOSE SERPL-MCNC: 258 MG/DL (ref 70–99)
HCT VFR BLD AUTO: 30.1 % (ref 40–53)
HGB BLD-MCNC: 10.2 G/DL (ref 13.3–17.7)
IRON SATN MFR SERPL: 30 % (ref 15–46)
IRON SERPL-MCNC: 65 UG/DL (ref 35–180)
MAGNESIUM SERPL-MCNC: 2.1 MG/DL (ref 1.6–2.3)
MCH RBC QN AUTO: 35.1 PG (ref 26.5–33)
MCHC RBC AUTO-ENTMCNC: 33.9 G/DL (ref 31.5–36.5)
MCV RBC AUTO: 103 FL (ref 78–100)
PHOSPHATE SERPL-MCNC: 3.4 MG/DL (ref 2.5–4.5)
PLATELET # BLD AUTO: 123 10E9/L (ref 150–450)
POTASSIUM SERPL-SCNC: 4.8 MMOL/L (ref 3.4–5.3)
PROT SERPL-MCNC: 8.1 G/DL (ref 6.8–8.8)
RBC # BLD AUTO: 2.91 10E12/L (ref 4.4–5.9)
SODIUM SERPL-SCNC: 139 MMOL/L (ref 133–144)
TACROLIMUS BLD-MCNC: 7.9 UG/L (ref 5–15)
TIBC SERPL-MCNC: 215 UG/DL (ref 240–430)
TME LAST DOSE: NORMAL H
WBC # BLD AUTO: 5 10E9/L (ref 4–11)

## 2020-04-22 PROCEDURE — 80197 ASSAY OF TACROLIMUS: CPT | Performed by: SURGERY

## 2020-04-22 PROCEDURE — 83735 ASSAY OF MAGNESIUM: CPT | Performed by: SURGERY

## 2020-04-22 PROCEDURE — 36415 COLL VENOUS BLD VENIPUNCTURE: CPT | Performed by: INTERNAL MEDICINE

## 2020-04-22 PROCEDURE — 99207 ZZC NO CHARGE NURSE ONLY: CPT

## 2020-04-22 PROCEDURE — 80048 BASIC METABOLIC PNL TOTAL CA: CPT | Performed by: SURGERY

## 2020-04-22 PROCEDURE — 84100 ASSAY OF PHOSPHORUS: CPT | Performed by: SURGERY

## 2020-04-22 PROCEDURE — 80076 HEPATIC FUNCTION PANEL: CPT | Performed by: SURGERY

## 2020-04-22 PROCEDURE — 96372 THER/PROPH/DIAG INJ SC/IM: CPT | Performed by: INTERNAL MEDICINE

## 2020-04-22 PROCEDURE — 85027 COMPLETE CBC AUTOMATED: CPT | Performed by: SURGERY

## 2020-04-22 PROCEDURE — 83540 ASSAY OF IRON: CPT | Performed by: INTERNAL MEDICINE

## 2020-04-22 PROCEDURE — 82728 ASSAY OF FERRITIN: CPT | Performed by: INTERNAL MEDICINE

## 2020-04-22 PROCEDURE — 83550 IRON BINDING TEST: CPT | Performed by: INTERNAL MEDICINE

## 2020-04-22 NOTE — TELEPHONE ENCOUNTER
Anemia Management Note  SUBJECTIVE/OBJECTIVE:  Referred by Dr. Eloy Rao on 3/2/2020  Primary Diagnosis: Anemia in Chronic Kidney Disease (N18.4, D63.1)     Secondary Diagnosis:  Chronic Kidney Disease, Stage 4 (N18.4)                 Liver Transplant: 2019  Hgb goal range:  10-11.5 per Dr. Rao's referral  Epo/Darbo: Aranesp 40mcg every 14 days for Hgb <11.5.  In Clinic.  Iron regimen:  Ferrous Sulfate 325mg once daily - was taking three times daily, decreased in   Labs : Hemoglobin - has standing order for CBC/platelets twice weekly - expires 2020  Iron Labs: 3/3/2021  Recent IVELISSE use, transfusion, IV iron: PRBC on 3/3  RX/TX plans : TBD  No history of stroke, MI and blood clots.  History of hepatocellular carcinoma - s/p TACE 2019 and liver transplant 2019     Contact:            Ok to leave message regarding scheduling, medical, and billing per consent to communicate dated 10/2/19                              OK to speak with Davi Saunders (friend/POA) regarding scheduling, medical, and billing per consent to communicate dated 10/2/19    Anemia Latest Ref Rng & Units 3/2/2020 3/3/2020 3/9/2020 3/19/2020 3/25/2020 2020 2020   IVELISSE Dose - - - - - 40mcg 40mcg 40mcg   Hemoglobin 13.3 - 17.7 g/dL 6.6(LL) - 8.8(L) 8.7(L) 8.6(L) 9.1(L) 10.2(L)   TSAT 15 - 46 % - - 40 - - - 30   Ferritin 26 - 388 ng/mL - - 859(H) - - - 643(H)   PRBCs - - 1 unit - - - - -     BP Readings from Last 3 Encounters:   20 (!) 145/77   20 136/77   20 137/81     Wt Readings from Last 2 Encounters:   20 70 kg (154 lb 4.8 oz)   20 74.1 kg (163 lb 6.4 oz)           ASSESSMENT:  Hgb:At goal - recommend dose  TSat: at goal >30% Ferritin: At goal (>100ng/mL)    PLAN:  Dose with aranesp and RTC for hgb then aranesp if needed in 2 week(s)    Orders needed to be renewed (for next follow-up date) in EPIC: None    Iron labs due:  2020    Plan discussed with:  No call   Made. Appt scheduled for 5/6/20 at 8am  Plan provided by:  adri    NEXT FOLLOW-UP DATE:  5/6/2020    Jie Blum RN   Anemia Services  [address] [address]   [email or web address]   Office : [phone]  Fax: [phone]

## 2020-04-22 NOTE — PROGRESS NOTES
Infusion Nursing Note:  Frandy Workman presents today for Aranesp.    Patient seen by provider today: No   present during visit today: Not Applicable.    Note: N/A.    Intravenous Access:  No Intravenous access/labs at this visit.    Treatment Conditions:  Lab Results   Component Value Date    HGB 10.2 04/22/2020     Lab Results   Component Value Date    WBC 5.0 04/22/2020      Lab Results   Component Value Date    ANEU 1.1 01/24/2020     Lab Results   Component Value Date     04/22/2020      Results reviewed, labs MET treatment parameters, ok to proceed with treatment.  Hold for Hgb <11.5  Today's /77. Hold for SBP >180  Confirms not receiving dialysis or IV iron.    Post Infusion Assessment:  Patient tolerated injection without incident.         Discharge Plan:   Patient discharged in stable condition accompanied by: self.  Departure Mode: Ambulatory.  Needs to make an appointment for two weeks from now-prompted patient.     Ekaterina Balbuena RN

## 2020-04-24 ENCOUNTER — TELEPHONE (OUTPATIENT)
Dept: INTERNAL MEDICINE | Facility: CLINIC | Age: 55
End: 2020-04-24

## 2020-04-24 NOTE — TELEPHONE ENCOUNTER
----- Message from Maximo Tucker,  sent at 4/24/2020  7:58 AM CDT -----  Regarding: Clinic appointment  Hello!   I apologize if this message should be directed elsewhere, but would it be possible to add Mr. Workman onto my schedule for next week via virtual visit or telephone visit? His transplant coordinator had reached out to me to see if he could be seen before 5/1.     Thank you and please let me know if there is someone else I should contact about this.     -Maximo

## 2020-04-24 NOTE — TELEPHONE ENCOUNTER
Called patient to schedule appointment with Dr. Tucker on Tuesday April 28 via virtual or telephone visit to follow up per patient's transplant coordinator. Left primary care number to call back provider hasf opening available.

## 2020-04-26 ENCOUNTER — TELEPHONE (OUTPATIENT)
Dept: TRANSPLANT | Facility: CLINIC | Age: 55
End: 2020-04-26

## 2020-04-26 NOTE — TELEPHONE ENCOUNTER
"Spoke with Art about concerns of Miller.    Art reports that Miller is in a \"reality shock\" stage. Art feels Miller's prevalent bipolar behaviors started day after he got a his card and check back from his biological daughter and she wrote void across check and sent it back. Miller was hoping to reconcile his relationship with Negra and it didn't happen. Negra told him she is changing her number and she graduates this year from high school. Negra did change her number. Art feels that Miller is hurt and he is facing reality and he doesn't like it and that is why he is having stress and having these bipolar behaviors. Art reports that he has known Miller for many many years over 40 and he has not seen Miller like this in many years. Art also did mention that Miller is going to be selling his house as it is 6 blocks away from his biological daughter. Davi did have this plan awhile ago when he did purchase this home. Art reports that he is flying in from [place] May 29th and they are meeting with efren on May 30th.   Art does report if he thinks the behavioral health doctor thinks he needs to be committed he would sign the papers tomorrow. And if he needs to be committed he thinks he could switch his plane ticket to sooner, but thought May 15th was soonest. He is running a business in [place], but he will do what is best for Miller. He just wants to ensure Miller's mental health is taken serious.     Art did report that Vijay Serrano is Miller's  and he is also is concerned about his mental health. They are working on changing his trust and Davi is the cosigner on trust. Vijay's phone numbers are [phone] or [phone]. email is: [email or web address].    Art also mentioned that things went really south with Jennifer, at [employer], and ties are severed. Art stated they were double billed and he is not happy and Art is one managing the bills. Art also knows there are two sides to the story, but Jennifer to bring over keys to house, " keys to new car, and bank card of Miller's today or tomorrow. Art reports he knows that Miller can be tough to deal with one he is on track mind of he is right and he is aware that sometimes makes things harder.     Art's email: [email or web address] if anything is needed for signature.     Art encouraged to call if he has any more info or concerns that he wants to discuss. Art would like an update after appt with Dr Murillo and also with PCP DR Schneider.     Forwarded message to DR Murillo and Dilan, .

## 2020-04-26 NOTE — TELEPHONE ENCOUNTER
"Miller, paged, to say he is agitated that he is not sleeping. Per patient he did have the police there to check on him. The police felt he was \"ok\" per patient. Patient reports that he called them himself to have them do a welfare check. Patient denies any suicidal ideations or wanting to harm himself.     Patient is agitated and has two boxes at his house that he wants delivered to his daughter. Patient did call his sister in law and she said for patient to lay down and she will help with daughter, Negra. Offered patient to reach out to behavioral health if he felt he was needing to discuss with them.   Patient is interested in selling his house.    Art, contacts patient on a regular basis to ensure that he is taking his medications.     Patient does have an appt with Dr Murillo on Monday and his PCP on Tuesday.    Patient reports that him venting was useful and he is going to lay down at this time and will call back. Patient refused to take down the number for behavioral health, but did except stating he would take it in a mychart message. Patient encouraged to use the behavioral health line if he feels he needs this.     Patient did talk to this writer for an hour.     "

## 2020-04-27 ENCOUNTER — VIRTUAL VISIT (OUTPATIENT)
Dept: BEHAVIORAL HEALTH | Facility: CLINIC | Age: 55
End: 2020-04-27
Payer: MEDICARE

## 2020-04-27 ENCOUNTER — TELEPHONE (OUTPATIENT)
Dept: TRANSPLANT | Facility: CLINIC | Age: 55
End: 2020-04-27

## 2020-04-27 DIAGNOSIS — F33.0 MILD RECURRENT MAJOR DEPRESSION (H): Primary | ICD-10-CM

## 2020-04-27 DIAGNOSIS — F31.70 BIPOLAR AFFECTIVE DISORDER IN REMISSION (H): ICD-10-CM

## 2020-04-27 NOTE — TELEPHONE ENCOUNTER
Transplant Social Work Services Phone Call        Data: Miller was seen by Dr. Murillo today.  He has declined a referral to Dr. Davison (psychiatry).  He has also stated he is unwilling to be started on any additional mental health medication(s).    Miller has been compliant with getting his transplant labs, and his coordinator reports his labs are within exceptable limits.  Radha does report concern about Miller not sleeping, his poor eating habits, double dosing of anti-rejection medication and diabetic management.  Intervention: I called Miller to discuss concerns related to his medication management and mental health.  We talked briefly and he needed to answer another call and said he would call me back.  He did say his appointment with Dr. Murillo went well.  His mood today is elated.    I consulted with Radha Ndiaye RN transplant coordinator, Dr. Murillo, [facility] Psychologist, and a social work colleague.  In-basket to Dr. Tucker who has an appointment with patient tomorrow.  Assessment:  Further assessment is needed.    Education provided by SW: deferred    Plan: Awaiting call back from Miller.  I will stay in communication with Miller's medical and mental health providers.  I plan to encourage Miller to accept an appointment with Dr. Davison.          ALEXYS Mcnair, [facility]  Liver Transplant   Phone [phone]  Pager [phone]

## 2020-04-27 NOTE — PROGRESS NOTES
"[facility]: Integrated Behavioral Health  April 27, 2020      Behavioral Health Clinician Progress Note    Patient Name: Frandy Workman           Service Type: Virtual Visit via [employer]      Service Location:  Virtual Visit via [employer]inglepharb      Session Start Time: 12:00  Session End Time: 12:55      Session Length: 53 - 60      Attendees: Patient    Visit Activities (Refresh list every visit): NEW and [facility] Only    Telemedicine Visit: The patient's condition can be safely assessed and treated via synchronous audio and visual telemedicine encounter.      Reason for Telemedicine Visit: COVID-19    Originating Site (Patient Location): Patient's home    Distant Site (Provider Location): Provider Remote Setting    Consent:  The patient/guardian has verbally consented to: the potential risks and benefits of telemedicine (video visit) versus in person care; bill my insurance or make self-payment for services provided; and responsibility for payment of non-covered services.     Mode of Communication:  Video Conference via Trov    As the provider I attest to compliance with applicable laws and regulations related to telemedicine.    Diagnostic Assessment Date: TBD  Treatment Plan Review Date: TBD  See Flowsheets for today's PHQ-9 and LIZZETTE-7 results  Previous PHQ-9:   PHQ-9 SCORE 1/9/2020   PHQ-9 Total Score 16     Previous LIZZETTE-7: No flowsheet data found.    HEATH LEVEL:  No flowsheet data found.    DATA  Extended Session (60+ minutes): No  Interactive Complexity: No  Crisis: No    Treatment Objective(s) Addressed in This Session:  Target Behavior(s): disease management/lifestyle changes      Discuss reasons for [facility] referral and determine treatment options.     Current Stressors / Issues:  Met with Miller today for about 55 minutes, 30 of which were spent helping him address technology issues in getting his video visit set up. For the remainder of the session, Miller stated he has been in \"pretty " "bad shape\" since 2014 and his liver transplant. He talked at length about several of his ongoing health issues, including kidney problems. Throughout the session, Miller's reporting appeared quite tangential and difficult to follow. He states having a mental health history of bipolar disorder, a diagnosis he strongly disagrees with. Regarding treatment history, Miller states he has met with a counselor/psychologist in the past and found that helpful. He seemed to appreciate our discussion today and was open to continuing with counseling.     Regarding psychiatry, discussed with Miller his recommendation to consult or establish with psychiatry for mood management. Miller insisted that a referral to psychiatry would be \"a waste of time\" for him. He shared a few experiences about trying medication in the past and this not going well for him. I explained how psychiatry services could explore several medication options for him and how new medications could help stabilize his mood. He eventually stated he would consider a psychiatry referral and was open to discussing this further with me at a later session.      Progress on Treatment Objective(s) / Homework:  No improvement - PRECONTEMPLATION (Not seeing need for change); Intervened by educating the patient about the effects of current behavior on health.  Evoked information about reasons to continue behavior, express concern / recommendations, and explored any change talk    Also provided psychoeducation about behavioral health condition, symptoms, and treatment options    Care Plan review completed: No    Medication Review:  No changes to current psychiatric medication(s)    Medication Compliance:  NA - not addressed today    Changes in Health Issues:   None reported    Chemical Use Review:   Substance Use: Chemical use reviewed, no active concerns identified      Tobacco Use: No current tobacco use.      Assessment: Current Emotional / Mental Status (status of significant " symptoms):  Risk status (Self / Other harm or suicidal ideation)  Patient denies a history of suicidal ideation, suicide attempts, self-injurious behavior, homicidal ideation, homicidal behavior and and other safety concerns  Patient denies current fears or concerns for personal safety.  Patient denies current or recent suicidal ideation or behaviors.  Patient denies current or recent homicidal ideation or behaviors.  Patient denies current or recent self injurious behavior or ideation.  Patient denies other safety concerns.  A safety and risk management plan has not been developed at this time, however patient was encouraged to call [place] [phone] should there be a change in any of these risk factors.    Appearance:   Appropriate   Eye Contact:   Good   Psychomotor Behavior: Normal   Attitude:   Cooperative  Guarded  Mychal  Orientation:   All  Speech   Rate / Production: Talkative   Volume:  Loud   Mood:    Hypomanic   Affect:    Appropriate   Thought Content:  Clear   Thought Form:  Tangential   Insight:    Poor     Diagnoses:  1. Mild recurrent major depression (H)    2. Bipolar affective disorder in remission (H)    (per records and reported history)     Collateral Reports Completed:  Not Applicable    Plan: (Homework, other):  Miller is scheduled to complete the diagnostic assessment for therapy in two weeks. A psychiatry consult is highly recommended; he would ideally benefit from establishing with a long-term psychiatrist. Dr. Davison for a consult could be a start, as he appears highly ambivalent and resistant about medications, however a referral to the psychiatry clinic would be ideal. A referral was not placed today as Miller declined the referral today. Psychotherapy is also recommended. He was quite open to this recommendation and is willing to continue with me for now.     Joseph Murillo, RED  4/29/2020

## 2020-04-27 NOTE — PROGRESS NOTES
"Frandy Workman is a 55 year old male who is being evaluated via a billable telephone visit.      The patient has been notified of following:     \"This telephone visit will be conducted via a call between you and your physician/provider. We have found that certain health care needs can be provided without the need for a physical exam.  This service lets us provide the care you need with a short phone conversation.  If a prescription is necessary we can send it directly to your pharmacy.  If lab work is needed we can place an order for that and you can then stop by our lab to have the test done at a later time.    Telephone visits are billed at different rates depending on your insurance coverage. During this emergency period, for some insurers they may be billed the same as an in-person visit.  Please reach out to your insurance provider with any questions.     If during the course of the call the physician/provider feels a telephone visit is not appropriate, you will not be charged for this service.\"    Patient has given verbal consent for Telephone visit?  Yes    How would you like to obtain your AVS? Mail a copy    Subjective   Frandy Workman is a 55 year old male with PMH notable for DM type II, ESTRADA cirrhosis, hepaocellular carcinoma s/p TACE (1/2018), s/p liver transplant (11/11/19). He presents today via phone visit at the request of his transplant coordinator after exhibiting manic behavior. Discussions with patient during this encounter were difficult due to rapid flight of ideas.      Patient had intended to fly to [place] to meet a woman he had never met but has been speaking with online. He states that he no longer plans to do this at the request of Dr. Banuelos and the transplant team not to fly. He plans on selling his house which per chart review had been planned for some time. However, he states that he plans for a \"significant upgrade\" and will cost him between $500-600k after selling his house. " "During our encounter he made several racially charged statements and frequent expletives which he has not done on prior encounters.  Reports significant interuptions in his sleep which he attributes to volitility. He has removed all knives (other than kitchen knives) from his home due to volatility but denies any thoughts of self harm or thoughts of him no longer existing.      Regarding his psychiatric history, patient reports being diagnosed with bipolar at an early age.  His only psychiatric hospitalization was in 2014 in [place] was noted to have a possible intentional lithium overdose at that time.  Past regimens have included lithium, Depakote, olanzapine, risperidone, fluoxetine and trazodone.  Patient is extremely hesitant to try any of these medications as he believes this is the primary cause of his underlying liver failure.     Patient self reports feeling extremely volatile. When asked about primary causes of this Miller's responses ranged from past pituitary surgery to his daughter's non[employer] schooling to various personal relationships. A common underlying theme was his frustration of having \"bipolar\" being included in his MyChart and repeatedly disputes this diagnosis. He states that he agrees with Dr. Murillo about an adjustment in his melatonin and inquires if his sertraline dose can be adjusted as well. He rarely skips medications but states he notifies the transplant team when this happens.  He reports having 2 aides coming to help him, one in the morning and one in the evening. He reports getting along well with them.      Reviewed and updated as needed this visit by Provider         Review of Systems   ROS COMP: Constitutional, HEENT, cardiovascular, pulmonary, GI, , musculoskeletal, neuro, skin, endocrine and psych systems are negative, except as otherwise noted.       Objective   Reported vitals:  There were no vitals taken for this visit.   alert  PSYCH: Alert and oriented times 3; flight " of ideas, pressured speech, no hallucinations or delusions  His affect is anxious  RESP: No cough, no audible wheezing, able to talk in full sentences  Remainder of exam unable to be completed due to telephone visits    Diagnostic Test Results:  Labs reviewed in Epic        Assessment/Plan:  There are no diagnoses linked to this encounter.    # Concern for manic behavior  Concerning behavior and comments throughout encounter with comments including removing knives in his house due to feeling volatile, planning to travel to [place] in May to visit a woman he has never met, and plans to spend large sounds on various expenses.  I am concerned that his sertraline may be contributing to amber and have asked that he discontinue this medication.    Although I do not feel patient would meet criteria for commitment at this time I do feel like he will need to initiate a mood stabilizer in the near future.  I have reached out to Dr. Brandon of psychiatry who patient is agreeable to meeting with. I have also reached out to Dr. Banuelos in the liver transplant coordinators for their assistance.  I have also spoken to Art at length today who is also very concerned about the patient's acutely worsening behavior.  Art plans to return to [place] at the end of May or May 15 at the earliest.  He is ultimately supportive if providers feel that patient meets criteria for commitment.      -Patient declining any mood stabilizer agent at this time (discussed Depakote, Seroquel, and risperidone); Would avoid lithium in the setting of CKD and possible past intentional overdose (2014)  -Pharmacy has been messaged by Dr. Banuelos regarding drug interaction between Prograf and Depakote  -Message sent to Dr. Brandon regarding arranging psychiatry appointment  -Will likely continue to need home cares if patient fires PCA services or if PCA services terminate patient  -Patient will need close follow-up with myself or another provider in primary care  clinic if I am not available      Phone call duration: 150 minutes    I was present for the critical portion of this video encounter.  I spent a total time of 40 minutes 100% in counseling and coordination of care this his mental health issues. I agree with the A/P outlined above.  Dr. Charlene Tucker,

## 2020-04-28 ENCOUNTER — VIRTUAL VISIT (OUTPATIENT)
Dept: INTERNAL MEDICINE | Facility: CLINIC | Age: 55
End: 2020-04-28
Payer: MEDICARE

## 2020-04-28 ENCOUNTER — TELEPHONE (OUTPATIENT)
Dept: TRANSPLANT | Facility: CLINIC | Age: 55
End: 2020-04-28

## 2020-04-28 DIAGNOSIS — F39 MOOD DISORDER (H): Primary | ICD-10-CM

## 2020-04-28 NOTE — NURSING NOTE
55 year old  Chief Complaint   Patient presents with     RECHECK     Follow up.       There were no vitals taken for this visit. There is no height or weight on file to calculate BMI.  BP completed using cuff size:      IRMA Seymour  April 28, 2020 9:51 AM

## 2020-04-28 NOTE — TELEPHONE ENCOUNTER
"Transplant Social Work Services Phone Call      Data/Intervention: I did not hear back from Miller yesterday so I called Miller today to check in with him.  I consulted with Dr. Tucker, Radha Ndiaye, Post Liver Transplant Coordinator, and social work colleagues.  There is ongoing concern about Miller's manic behavior.  Dr. Tucker has discontinued sertraline today.  I called Miller along with the [facility] Mobile Crisis Unit ([phone], Felipe) to complete a safety assessment.  His PCA/LPN Kathryn was included this call.  She has been involved in his care since just yesterday.    Assessment: Miller acknowledges he has made some medication mistakes, and he has hired new PCA providers to come in daily from 7am to 9pm. He does not have supervision from 9pm to 7am.  Miller says he would go to the emergency room right now if it were not for COVID.  He says he has \"never felt this way.\" He denies suicidal ideation.  He endorses homicidal ideation if someone says \"anything about my daughter.\"  His behavior is erratic and his speech is rapid.  Plan: The [facility] Mobile Crisis Unit will contact [facility] Police to have them complete a welfare check and determine if patient should be brought in for evaluation.      ALEXYS Mcnair, [facility]  Liver Transplant   Phone [phone]  Pager [phone]   "

## 2020-04-28 NOTE — TELEPHONE ENCOUNTER
Spoke with patient. He reports that he is stopping his zoloft and throwing it away. He said he is now concerned that his blood pressure is on the rise. Yesterday 149/88 . Today 163/88 and HR 97. Message to PCP, Dr Tucker.      Pt has a new caregiver that comes into his home to help. Kathryn [phone].     While on phone with Miller, he kept giving demands of things for her to do and was distracted with conversation. Instructed patient that this writer would send a message to PCP and return his call.

## 2020-04-29 ENCOUNTER — TELEPHONE (OUTPATIENT)
Dept: TRANSPLANT | Facility: CLINIC | Age: 55
End: 2020-04-29

## 2020-04-29 ENCOUNTER — TELEPHONE (OUTPATIENT)
Dept: GASTROENTEROLOGY | Facility: CLINIC | Age: 55
End: 2020-04-29

## 2020-04-29 DIAGNOSIS — E78.5 HYPERLIPIDEMIA, UNSPECIFIED HYPERLIPIDEMIA TYPE: ICD-10-CM

## 2020-04-29 NOTE — TELEPHONE ENCOUNTER
"Contacted caretaker, Kathryn, to ensure they arrived home safely. And She put phone on Speaker and handed it to Miller. Miller stated Art is landing at 4pm and this writer needs to \"figure your shit out by tomorrow at 10 am.\" he thinks this writer is stupid and doesn't care and kept repeating and then said \"You are stupid, DR Banuelos is GOD and he can talk to me only.\" then Miller hung up the phone.  "

## 2020-04-29 NOTE — TELEPHONE ENCOUNTER
Called patient this afternoon after not connecting for scheduled phone visit this AM.    Patient is manic and tangential with his speech.  He is considering moving and transferring his care to [facility] in [place].  He also has some paranoid ideas about his care team.    He has some insight that his mental health is suboptimal and is amenable to seeing a psychiatrist.  He would like to keep his transplant coordinator, PCP and Joseph Murillo in his care team in addition to seeing someone from psychiatry.      He denies any suicidal ideation or plans for self-harm.    Will refer to psychiatry ASAP.    Santi Banuelos MD  Hepatology  [facility]

## 2020-04-30 ENCOUNTER — TELEPHONE (OUTPATIENT)
Dept: TRANSPLANT | Facility: CLINIC | Age: 55
End: 2020-04-30

## 2020-04-30 DIAGNOSIS — Z94.4 LIVER TRANSPLANT RECIPIENT (H): Primary | ICD-10-CM

## 2020-04-30 RX ORDER — OLANZAPINE 5 MG/1
5 TABLET ORAL AT BEDTIME
Qty: 30 TABLET | Refills: 4 | Status: ON HOLD | OUTPATIENT
Start: 2020-04-30 | End: 2020-06-25

## 2020-04-30 NOTE — TELEPHONE ENCOUNTER
"Spoke with patient. He is talking about someone listening to our phone conversations.   He is fixated on what is in his house and his fridge. Art's root beer and some orange juice and that his house is a mess. It is usually put together and everything is scattered through out.   He did state that he has not slept in days. He says been up over 24 hours as this time, and before that it was 28 hours of awake time. Patient says most often he is pacing his house or sitting on his couch, but majority time he is pacing.   Patient says \"if you mention these names I will kill you. The names are Art/JR, david, Sahil, Kelsi.\" \"if you state I have bipolar. I am of sound mind. I will kill you.\"   Patient proceeded to state that he will take zyprexa but he is not safe to pick it up. Spoke with [facility] and they will deliver tonight. Patient states \"if you call the mobile crisis center again. I will kill you. I will accept a [place] police wellfare check.\" \"you need to cut ties with that crisis place they are fucking idiots.\"     When calling patient back he had a different speed in his tone and seemed calmer, but he did get ramped up and stated he missed his meds last night. He has not checked his blood sugar since yesterday and it was 91. Patient does report that he is nonstop pacing, but he is tired. Patient stated \"I will be dead in 4 days.\" when this writer asked what he meant by that he started to cry and he said \"I will stop taking my meds and I will be done.\" this writer told him that we care about him at the transplant center and want to ensure for his health and safety. He chucked phone at the wall and told this writer \"you are making me angry. Shut up. Just shut up. i'm hanging up before it gets worse.\"     Discussed with Dilan,. Will ask [place] police to do welfare check. Spoke with dispatcher and quoted patient's behaviors and he will kill people if they mention names or bipolar. Message to dr" jazmín, dr mohr, dr prince and dr Banuelos. with information.     [facility] will call this writer with an update.

## 2020-04-30 NOTE — TELEPHONE ENCOUNTER
Officer Ivania [phone] did go visit Miller.    Officer Ivania stated Miller answered door. He asked if she felt he was bipolar and she answered she didn't know and wanted to ensure that he was safe, taking his meds and if he needed anything. Patient said he is fine and asked her to throw away his klondike bars because aneta would not approve with me being diabetic. She said he looked ok and did get easily agitated. She asked that he call if he needed anything.  She was there probably about 10 mins.     Updated team.

## 2020-05-01 ENCOUNTER — DOCUMENTATION ONLY (OUTPATIENT)
Dept: TRANSPLANT | Facility: CLINIC | Age: 55
End: 2020-05-01

## 2020-05-01 NOTE — PROGRESS NOTES
Consulted with Radha Ndiaye RN Liver Transplant Coordinator and Risk Management. [facility] report made (report #[id number]).        ALEXYS Mcnair, [facility]  Liver Transplant   Phone [phone]  Pager [phone]

## 2020-05-04 ENCOUNTER — TELEPHONE (OUTPATIENT)
Dept: TRANSPLANT | Facility: CLINIC | Age: 55
End: 2020-05-04

## 2020-05-04 ENCOUNTER — DOCUMENTATION ONLY (OUTPATIENT)
Dept: LAB | Facility: CLINIC | Age: 55
End: 2020-05-04

## 2020-05-04 NOTE — TELEPHONE ENCOUNTER
Contacted [facility] to determine if patient qualifies. Left message for Hannah, to return call.

## 2020-05-04 NOTE — PROGRESS NOTES
Pt is requesting a Lipid panel to be done please place future lab order in  Thank you Pippa Duong

## 2020-05-04 NOTE — TELEPHONE ENCOUNTER
I received a letter from [facility] reporting patient's case has been reviewed and assigned for investigation.  Beto Tejeda is the adult protection  ([phone]).  Radha Ndiaye RN Transplant Coordinator and I spoke with him this morning, and he will be in contact with patient today.      Radha Ndiaye RN Transplant Coordinator will explore a referral to [facility] for RN visits.    This message will be routed to team members (Dr. Tucker, Dr. Murillo, Dr. Davison (referral pending), Dr. Banuelos, Radha Ndiaye and Maday Kulkarni.         ALEXYS Mcnair, [facility]  Liver Transplant   Phone [phone]  Pager [phone]

## 2020-05-05 ENCOUNTER — DOCUMENTATION ONLY (OUTPATIENT)
Dept: TRANSPLANT | Facility: CLINIC | Age: 55
End: 2020-05-05

## 2020-05-05 NOTE — PROGRESS NOTES
I spoke with Beto Tejeda with [facility] today ([phone]).  He spoke with patient this morning.  Miller declined to accept any additional services at this time.  Per Beto, Miller has housekeeping through [employer], and also continues to have a private hire care giver (Gabrielle from [employer]  Beto plans to contact [facility] to gather more information.      Beto reports he will keep patient's case open.  Any additional concerns should be reported to Beto at [phone].      Message routed to Dr. Tucker, Dr. Banuelos, Dr. Davison, Dr. Murillo, Radha Ndiaye and Maday Kulkarni, [facility].    ALEXYS Mcnair, [facility]  Liver Transplant   Phone [phone]  Pager [phone]

## 2020-05-06 ENCOUNTER — TELEPHONE (OUTPATIENT)
Dept: TRANSPLANT | Facility: CLINIC | Age: 55
End: 2020-05-06

## 2020-05-06 ENCOUNTER — NURSE TRIAGE (OUTPATIENT)
Dept: NURSING | Facility: CLINIC | Age: 55
End: 2020-05-06

## 2020-05-06 ENCOUNTER — DOCUMENTATION ONLY (OUTPATIENT)
Dept: TRANSPLANT | Facility: CLINIC | Age: 55
End: 2020-05-06

## 2020-05-06 LAB
HCT VFR BLD AUTO: 30.3 %
HEMOGLOBIN: 10.2 G/DL (ref 13.3–17.7)

## 2020-05-06 RX ORDER — ROSUVASTATIN CALCIUM 5 MG/1
5 TABLET, COATED ORAL DAILY
Qty: 30 TABLET | Refills: 3 | OUTPATIENT
Start: 2020-05-06

## 2020-05-06 NOTE — TELEPHONE ENCOUNTER
In light of the COVID 19 pandemic, [facility] cares about your safety and we are calling to convert your scheduled in-person clinic visit to a phone visit on 5/12/20 date.  The appointment will be on the same date and time.  What is the best number for the provider to call you at?  [phone]

## 2020-05-06 NOTE — TELEPHONE ENCOUNTER
"Noticed patient did not check in for labs and infusion this morning. Contacted patient and he was rude. Wanted this writer to call infusion center to let them know he is on his way and hung up.     Spoke with [facility], they will see patient when he arrives.     Contacted patient back. He has lost his car keys. He will see if his neighbor, Isaias, can drive him down. Patient then stated \"i want nothing to do you guys at the [facility]; you pissed me off so bad. I think I  had a heart attack on friday and i loaded myself up on aspirin.I will not go in anywhere. I do not want your fucking services. i'm in survival mode and i'm calling my neighbor. Bye.\"  Updated Dilan, . joby  Message to DR Banuelos and DR Tucker.   "

## 2020-05-06 NOTE — PROGRESS NOTES
I received a call from [facility] (Dr. Zheng, [phone]) reporting Miller called 911 and was brought to their emergency department. He was concerned about his blood sugars and blood pressure.  I contacted Radha Ndiaye, RN Transplant Coordinator and she also spoke with this Dr. Zheng.  We requested a psychiatry consult, and Carla agreed to order a virtual psychiatry consult.  I paged Dr. Tucker and requested he contact this ED provider to provide additional information and coordination.    This message will be routed to team members involved in Miller's care.  I updated [facility] (Beto, [phone]).    ALEXYS Mcnair, [facility]  Liver Transplant   Phone [phone]  Pager [phone]

## 2020-05-06 NOTE — TELEPHONE ENCOUNTER
Patient calling reports he was supposed to have a blood rest appointment and an injection this morning but missed his appointment because he was in the emergency department. He is calling to reschedule these at the infusion center in [place]. Advised patient to call the infusion center tomorrow morning to reschedule. Patient verbalized understanding and had no further questions.    Courtney Griffin, RN/[facility]    Reason for Disposition    Requesting regular office appointment    Additional Information    Negative: [1] Caller is not with the adult (patient) AND [2] reporting urgent symptoms    Negative: Lab result questions    Negative: Medication questions    Negative: Caller can't be reached by phone    Negative: Caller has already spoken to PCP or another triager    Negative: RN needs further essential information from caller in order to complete triage    Protocols used: INFORMATION ONLY CALL-A-

## 2020-05-07 ENCOUNTER — DOCUMENTATION ONLY (OUTPATIENT)
Dept: TRANSPLANT | Facility: CLINIC | Age: 55
End: 2020-05-07

## 2020-05-07 ENCOUNTER — TELEPHONE (OUTPATIENT)
Dept: INTERNAL MEDICINE | Facility: CLINIC | Age: 55
End: 2020-05-07

## 2020-05-07 ENCOUNTER — VIRTUAL VISIT (OUTPATIENT)
Dept: ENDOCRINOLOGY | Facility: CLINIC | Age: 55
End: 2020-05-07
Payer: MEDICARE

## 2020-05-07 DIAGNOSIS — N18.30 TYPE 2 DIABETES MELLITUS WITH STAGE 3 CHRONIC KIDNEY DISEASE, WITH LONG-TERM CURRENT USE OF INSULIN (H): Primary | ICD-10-CM

## 2020-05-07 DIAGNOSIS — E11.22 TYPE 2 DIABETES MELLITUS WITH STAGE 3 CHRONIC KIDNEY DISEASE, WITH LONG-TERM CURRENT USE OF INSULIN (H): Primary | ICD-10-CM

## 2020-05-07 DIAGNOSIS — Z79.4 TYPE 2 DIABETES MELLITUS WITH STAGE 3 CHRONIC KIDNEY DISEASE, WITH LONG-TERM CURRENT USE OF INSULIN (H): Primary | ICD-10-CM

## 2020-05-07 NOTE — PROGRESS NOTES
Miller was seen in the [place] ED yesterday.  The following is from Dr. Zheng's note:  He did consent to taking oral Zyprexa. 10 mg actually seemed to help fairly well. Recently he was prescribed 5 mg at bedtime. He is willing to increase that to 10 mg at bedtime and see if that helps. He was also amenable to talking with our acute psychiatric service, [facility]. Please see their assessment. However, despite finding that he is likely manic or hypomanic, he is coherent, understands his medical conditions, and is able to make his own medical decisions at this time. He declines any further psychiatric treatment or hospitalization.     I updated Beto with [facility] [place].  He has not heard back from [employer] to evaluate the level of assistance is being provided.  Patient did take an Uber home from the ER.  Patient's case will remain open at this time.        ALEXYS Mcnair, [facility]  Liver Transplant   Phone [phone]  Pager [phone]

## 2020-05-07 NOTE — PROGRESS NOTES
"Frandy Workman is a 55 year old male who is being evaluated via a billable video visit.      The patient has been notified of following:     \"This video visit will be conducted via a call between you and your physician/provider. We have found that certain health care needs can be provided without the need for an in-person physical exam.  This service lets us provide the care you need with a video conversation.  If a prescription is necessary we can send it directly to your pharmacy.  If lab work is needed we can place an order for that and you can then stop by our lab to have the test done at a later time.    Video visits are billed at different rates depending on your insurance coverage.  Please reach out to your insurance provider with any questions.    If during the course of the call the physician/provider feels a video visit is not appropriate, you will not be charged for this service.\"    Patient has given verbal consent for Video visit? Yes    How would you like to obtain your AVS? Jordan    Patient would like the video invitation sent by: Send to e-mail at: [email or web address]    Will anyone else be joining your video visit? No        Video-Visit Details    Type of service: transitioned to phone as unable to connect, disconnected.    Originating Location (pt. Location): Other patinet driving walking various locations.     Distant Location (provider location):  [facility] ENDOCRINOLOGY     Platform used for Video Visit: ruma Pino        [facility]  Endocrinology  Tish Toscano   5/7/2020    Chief Complaint:   Diabetes    History of Present Illness:   Frandy Workamn is a 55 year old male with a history of with a history of liver cirrhosis secondary to ESTRADA, HCC dx in 2018 s/p liver transplant in 11/2019 which was complicated by hematoma evacuation on POD 1, stage III CKD, CAD, HTN, HLD and diabetes mellitus type 2 with retinopathy who presents for follow-up.     When I last saw see him in " "clinic in February he was doing well with insulin therapy only using his Fidelia Accu-Chek meter to manage basal and bolus regimen with limited hypoglycemia.  In hopes of addressing his limited hypoglycemia we did make a couple of changes.   I did update settings on expert meter.  Corrections were adjusted from 1 unit per 30 mg/dL to  1 unit per 50 mg/dL as he was always subtracting units for what the meter was recommending.    -Novolog  1 unit : 8 grams carb  -Novolog correction scale (check BG 4 times daily). Prior to meal: 1 unit per 50 mg/dL >140. Only taking Novolog at bedtime if >190 mg/dL.  Target 100-130 during the day.   -Basaglar 28 units daily     I initially reach Khalif by phone and he asked me to call him back.  He then was concerned I did not have access to his records recently at [facility] and wanted me to get authorization and review those before we spoke further.  He expressed frustration about care with our facility and insinuations that he has bipolar affective disorder, which she does not believe is accurate.  He was rather tangential in his speak but was able to tell me that he has been using his Fidelia expert meter and currently has an average blood sugar of 143 in the last 60 days, with a standard deviation of 47 and 161 blood sugar checks.  He reports that recent bedtime blood sugars have been 129 and 179.    He is thinks that \"we do not know what were doing\" as he previously did better on Tresiba and he would like to return to [facility] as his blood sugar was much more even on that.  He notes at this point at times he takes no insulin and his blood sugar will rise to 188.  He states that it was recently at the [facility] and told that his A1c was just 6.5% so he is wondering if he even has diabetes.  At another point he says that yes every day he is taking insulin and using his Fidelia expert meter to guide exactly how much Humalog he takes.  It sounds like his Zyprexa dose has been " changing and he is blaming that for his blood sugar changes, but he feels that his mind is working better than ever.  With Basaglar he reports that he needs to take 48 units daily.  He feels when he takes Tresiba he needs less short acting insulin and has less lows, and his A1c was just 6.4 with this.  He tells me that the lowest blood sugar he is seen since I last saw him was 59.  He has reduced his Lantus to just 26 units and tells me he is now taking just 1 unit of insulin per 12 g of carbohydrate.  He tells me that yesterday his blood sugar initially was 153 x 1117 it was 263 and then sometime in the afternoon it was 383 and this is all without any Humalog or NovoLog.  He is planning to see Dr. Barba in 2 weeks about his feet.  He believes we are prescribing too much insulin he has 18 vials of it at home, but he does need test strips and camilo sensors and would like us to fill out some paperwork for him to continue getting his diabetic supplies.      BG data:  Patient rather tangential.  BG as ascertained above.  60 day avg 143 +/- 47, n + 163  Recalled range 59 - 383 (denies higher but initially denied higher than 179.)      Diabetes monitoring and complications:  CAD: Yes  Last eye exam results: 01/29/2020; stable with no evidence of retinopathy.   Microalbuminuria: 20 in 12/2019   Neuropathy: Yes; alpha-lipoic acid 600 mg daily   HTN: Yes; carvedilol 12.5 mg BID   On Statin: Yes; rosuvastatin 5 mg daily   On Aspirin: Yes   Depression: Yes; Zoloft 50 mg daily   Erectile dysfunction: Yes    Patient Supplied Answers To Diabetic Questionnaire  including 5/2/2020   1. Since your last visit to our clinic (or if this your first visit, since you last saw your primary care provider), have you experienced any of the following symptoms that may be related to low blood sugars? Shaking or trembling   2. Since your last visit to our clinic (or if this your first visit, since you last saw your primary care provider),  have you experienced any of the following symptoms that may be related to prolonged high blood sugars? No, I have not experienced any of these symptoms   3. Have you had any concerns that you would like to discuss today? Shortness of breath   4. Do you have any female family members who have had heart attacks or strokes before age 60 or male relatives who have had heart attacks or strokes before age 50? Yes   5. Do you have any family members who have had complications from diabetes such as kidney disease, heart disease or strokes, retinopathy (a vision problem), or amputations? Yes   6. Who do you live with?  Mom yes until i was 23   Little interest or pleasure in doing things? Not at all   Feeling down, depressed, or hopeless? Not at all      Review of Systems:   Pertinent items are noted in HPI.  All other systems are negative.    Active Medications:      Acetaminophen (TYLENOL) 325 MG CAPS, Take 325-650 mg by mouth every 4 hours as needed (pain, fever), Disp: 100 capsule, Rfl: 3     alpha-lipoic acid 600 MG capsule, Take 1 capsule (600 mg) by mouth daily, Disp: 90 capsule, Rfl: 3     Artificial Tear Solution (SM ARTIFICIAL TEARS) SOLN, Place 1 drop into the right eye every hour as needed (dry eyes) Apply at least 4 times daily and as needed for dry eye, Disp: 1 Bottle, Rfl: 3     aspirin 81 MG EC tablet, Take 1 tablet (81 mg) by mouth daily, Disp: , Rfl:      BD VIKTORIA U/F 32G X 4 MM insulin pen needle, Use 5 per day, Disp: 300 each, Rfl: 3     blood glucose (ACCU-CHEK EDINSON PLUS) test strip, USE TO TEST FOUR TIMES DAILY OR AS DIRECTED, Disp: 400 strip, Rfl: 6     blood glucose monitoring (ACCU-CHEK FASTCLIX) lancets, Use to test blood sugar 4 times daily or as directed.  1 box = 102 lancets, Disp: 408 each, Rfl: 3     econazole nitrate 1 % external cream, Apply topically daily To feet and toenails., Disp: 85 g, Rfl: 0     ferrous sulfate (FEROSUL) 325 (65 Fe) MG tablet, Take 1 tablet (325 mg) by mouth daily (with  breakfast), Disp: 90 tablet, Rfl: 3     Glucagon 3 MG/DOSE POWD, Spray 1 Dose in nostril as needed (for low blood sugar with sedation or emesis (unable to ingest glucose)), Disp: 1 each, Rfl: 1     glucose (BD GLUCOSE) 4 g chewable tablet, Take 1 tablet by mouth every hour as needed for low blood sugar, Disp: 60 tablet, Rfl: 1     insulin aspart (NOVOLOG PEN) 100 UNIT/ML pen, Inject 1 unit : 8 grams carb + sliding scale 4 times daily Max units per day   80 units daily ., Disp: 75 mL, Rfl: 3     insulin glargine (BASAGLAR KWIKPEN) 100 UNIT/ML pen, Inject 28 Units Subcutaneous daily, Disp: 30 mL, Rfl: 3     lamiVUDine (EPIVIR) 100 MG tablet, Take 1 tablet (100 mg) by mouth daily, Disp: 30 tablet, Rfl: 3     magnesium oxide (MAG-OX) 400 MG tablet, Take 1 tablet (400 mg) by mouth daily, Disp: 60 tablet, Rfl: 0     melatonin 5 MG tablet, Take 5 mg by mouth nightly as needed for sleep Take at 6 pm every night, Disp: , Rfl:      Multiple Vitamin (THERAVITE PO), Take 1 tablet by mouth every morning , Disp: , Rfl:      omeprazole (PRILOSEC) 40 MG DR capsule, Take 1 capsule (40 mg) by mouth daily, Disp: 90 capsule, Rfl: 2     rosuvastatin (CRESTOR) 5 MG tablet, Take 1 tablet (5 mg) by mouth daily, Disp: 30 tablet, Rfl: 3     sertraline (ZOLOFT) 50 MG tablet, Take 1 tablet (50 mg) by mouth daily, Disp: 90 tablet, Rfl: 0     sulfamethoxazole-trimethoprim (BACTRIM/SEPTRA) 400-80 MG tablet, Take 1 tablet by mouth twice a week Monday and Thursday only, Disp: 30 tablet, Rfl: 11     tacrolimus (GENERIC EQUIVALENT) 1 MG capsule, Take 2 capsules (2 mg) by mouth every morning AND 1 capsule (1 mg) every evening. (Patient taking differently: take 5 mg in the morning and 5 mg in the evening), Disp: 90 capsule, Rfl: 0     tamsulosin (FLOMAX) 0.4 MG capsule, TAKE 1 CAPSULE(0.4 MG) BY MOUTH DAILY, Disp: 90 capsule, Rfl: 3     valGANciclovir (VALCYTE) 450 MG tablet, Take 1 tablet (450 mg) by mouth every other day, Disp: 15 tablet, Rfl: 1      "vitamin B-12 (CYANOCOBALAMIN) 1000 MCG tablet, Take 1 tablet (1,000 mcg) by mouth daily, Disp: 30 tablet, Rfl: 0     vitamin D3 (CHOLECALCIFEROL) 2000 units (50 mcg) tablet, Take 1 tablet (2,000 Units) by mouth daily, Disp: 30 tablet, Rfl: 0    Current Facility-Administered Medications:      Aflibercept (EYLEA) injection 2 mg, 2 mg, Intravitreal, Q28 Days, Enriqueta Martin MD      Allergies:   Codeine and Seasonal allergies      Past Medical History:  Anemia   BPH   CAD   HTN   HLD   Depression   Conductive hearing loss   GERD   Hepatocellular carcinoma   Liver cirrhosis secondary to ESTRADA   Immunosuppressed    Malnutrition   Chronic CKD stage III   Portal vein thrombosis   Diabetes mellitus type 2   Retinopathy   Bipolar affective disorder   Esophageal varices   Erectile dysfunction   Anxiety      Past Surgical History:  Heart catheterization-02/2019   Gold weight eyelid implant; right-11/2017   Chemo embolization-12/2019   Left knee surgery   Right parotidectomy with radical neck dissection-11/2017   Liver transplant-11/11/2019   Exploratory laparotomy, hematoma evacuation and abdominal washout-11/12/2019     Family History:   Father: colon cancer, pancreatic cancer, prostate cancer, colorectal cancer, macular degeneration, glaucoma, skin cancer   Mother: cancer, diabetes, cerebrovascular disease, thyroid disease, depression   Sister: asthma, thyroid disease, depression   Maternal grandfather: prostate cancer, substance abuse   Maternal grandmother: colorectal cancer, substance abuse   Paternal grandmother: colorectal cancer       Social History:   The patient was alone   Smoking Status: never   Smokeless Tobacco: former; chew (1 tin per week)    Alcohol Use: former; quit in 1996       Physical Exam:   /81   Pulse 87   Ht 1.753 m (5' 9\")   Wt 72.3 kg (159 lb 8 oz)   BMI 23.55 kg/m       Wt Readings from Last 10 Encounters:   03/04/20 72.3 kg (159 lb 8 oz)   03/03/20 71.9 kg (158 lb 9.6 oz) "   03/02/20 74.8 kg (164 lb 12.8 oz)   02/11/20 73.3 kg (161 lb 8 oz)   02/06/20 71.9 kg (158 lb 8 oz)   01/27/20 73.1 kg (161 lb 3.2 oz)   01/21/20 72.9 kg (160 lb 11.2 oz)   01/13/20 74.3 kg (163 lb 14.4 oz)   01/09/20 73.9 kg (162 lb 14.4 oz)   12/30/19 75.9 kg (167 lb 6.4 oz)        Constitutional: Pleasant no acute cardiopulmonary distress.   EYES: anicteric, normal extra-ocular movements, no lid lag or retraction.  HEENT: Mouth/Throat: Mucous membrane is moist.   Cardiovascular: RRR, S1, S2 normal.   Pulmonary/Chest: CTAB. No wheezing or rales.   Abdominal: +BS. Non tender to palpation.    Neurological: Alert and oriented.    Extremities: No edema.  Psychological: appropriate mood and affect      Data:  Lab Results   Component Value Date     03/02/2020    POTASSIUM 5.6 (H) 03/02/2020    CHLORIDE 113 (H) 03/02/2020    CO2 21 03/02/2020    ANIONGAP 6 03/02/2020     (H) 03/02/2020    BUN 56 (H) 03/02/2020    CR 1.92 (H) 03/02/2020    DEBORAH 9.5 03/02/2020     Lab Results   Component Value Date    GFRESTIMATED 38 (L) 03/02/2020    GFRESTIMATED 38 (L) 03/02/2020    GFRESTIMATED 45 (L) 02/24/2020    GFRESTBLACK 44 (L) 03/02/2020    GFRESTBLACK 44 (L) 03/02/2020    GFRESTBLACK 52 (L) 02/24/2020      Lab Results   Component Value Date    MICROL 72 03/02/2020    UMALCR 70.89 (H) 03/02/2020        Lab Results   Component Value Date    A1C 6.6 (H) 08/08/2018    A1C 6.5 (H) 06/09/2017    A1C 7.8 (H) 10/25/2016    HEMOGLOBINA1 4.8 03/04/2020    HEMOGLOBINA1 5.1 12/02/2019    HEMOGLOBINA1 5.4 08/14/2019    HEMOGLOBINA1 6.1 (A) 02/11/2019    HEMOGLOBINA1 8.1 (A) 04/11/2018     Lab Results   Component Value Date    CHOL 131 02/04/2019    CHOL 133 08/08/2018    TRIG 117 02/04/2019    TRIG 172 (H) 08/08/2018    HDL 26 (L) 02/04/2019    HDL 30 (L) 08/08/2018    LDL 81 02/04/2019    LDL 68 08/08/2018    NHDL 105 02/04/2019    NHDL 103 08/08/2018       Assessment:  Type 2 diabetes mellitus with stage 3 chronic kidney  disease, with long-term current use of insulin (HOli Wise is a 55 year old male who has a 25+ year history of type II diabetes which is now managed with insulin only.  He is quite tangential, with dynamic mood today, but from the information I gather it does sound like he currently is managing his blood glucose insulin within a safe range for the most part.  He definitely wants to switch his long-acting insulin to Tresiba.  I think that is probably a good idea being that it is unclear whether he is able to give the insulin every 24 hours at this point.  We will slightly reduce it to 26 units daily.  I did advise him to continue to use his Fidelia Accu-Chek meter to dose his Humalog with the updated carbohydrate ratio of 1 unit per 12 g of carbohydrate as in reviewing the examples he gave today he clearly does still need some short needs some acting insulin.    Plan:  -He should Tresiba 26 units daily.   -Change mealtime insulin to 1 unit for every 12 grams of carbohydrates (instead of 1 unit for every 10 grams of carbohydrates).  He has updated the new meal CHO coverage in his meter.   No change in his correction scale.   -Contact us if any values <70.      Follow-up:     >50% of 35 minute visit spent in face to face counseling, education and coordination of care related to options for better glycemic control as well as preventing, detecting, and treating hypoglycemia.

## 2020-05-07 NOTE — TELEPHONE ENCOUNTER
"Paged by transplant coordinator that patient had called EMS for himself due to elevated BP (160s/80s). I did speak with the ED provider who felt that he was hemodynamically stable but offered to have psych evaluate patient.     Patient states that he feels like he is doing better than the last time we spoke. He reports he and Art got into a dispute and he will no longer be coming from [place] to visit [facility]. [facility] states he plans to \"cut him out\" of his life after this most recent dispute. He does report taking Zyprexa but notes that he has difficulty with sleep after taking this.      Over the phone, patients ability to hold a conversation was improved and he was significantly less tangential. He reports being frustrated by his providers and the focus on treating his mental health with medications. We were not able to continue the conversation further as he needed to check in for another appointment. I will alert transplant team of our conversation.   "

## 2020-05-08 ENCOUNTER — TELEPHONE (OUTPATIENT)
Dept: PHARMACY | Facility: CLINIC | Age: 55
End: 2020-05-08

## 2020-05-08 ENCOUNTER — INFUSION THERAPY VISIT (OUTPATIENT)
Dept: INFUSION THERAPY | Facility: CLINIC | Age: 55
End: 2020-05-08
Payer: MEDICARE

## 2020-05-08 VITALS
SYSTOLIC BLOOD PRESSURE: 155 MMHG | TEMPERATURE: 98.1 F | WEIGHT: 160.5 LBS | BODY MASS INDEX: 23.7 KG/M2 | HEART RATE: 114 BPM | RESPIRATION RATE: 16 BRPM | OXYGEN SATURATION: 100 % | DIASTOLIC BLOOD PRESSURE: 78 MMHG

## 2020-05-08 DIAGNOSIS — N18.4 CKD (CHRONIC KIDNEY DISEASE) STAGE 4, GFR 15-29 ML/MIN (H): Primary | ICD-10-CM

## 2020-05-08 DIAGNOSIS — N18.4 ANEMIA OF CHRONIC RENAL FAILURE, STAGE 4 (SEVERE) (H): ICD-10-CM

## 2020-05-08 DIAGNOSIS — D63.1 ANEMIA OF CHRONIC RENAL FAILURE, STAGE 4 (SEVERE) (H): ICD-10-CM

## 2020-05-08 PROCEDURE — 96372 THER/PROPH/DIAG INJ SC/IM: CPT | Performed by: PHYSICIAN ASSISTANT

## 2020-05-08 PROCEDURE — 99207 ZZC NO CHARGE NURSE ONLY: CPT

## 2020-05-08 ASSESSMENT — PAIN SCALES - GENERAL: PAINLEVEL: NO PAIN (0)

## 2020-05-08 NOTE — TELEPHONE ENCOUNTER
Anemia Management Note  SUBJECTIVE/OBJECTIVE:  Referred by Dr. Eloy Rao on 3/2/2020  Primary Diagnosis: Anemia in Chronic Kidney Disease (N18.4, D63.1)     Secondary Diagnosis:  Chronic Kidney Disease, Stage 4 (N18.4)                 Liver Transplant: 2019  Hgb goal range:  10-11.5 per Dr. Rao's referral  Epo/Darbo: Aranesp 40mcg every 14 days for Hgb <11.5.  In Clinic.  Iron regimen:  Ferrous Sulfate 325mg once daily - was taking three times daily, decreased in   Labs : Hemoglobin - has standing order for CBC/platelets twice weekly - expires 2020  Iron Labs: 3/3/2021  Recent IVELISSE use, transfusion, IV iron: PRBC on 3/3  RX/TX plans : TBD  No history of stroke, MI and blood clots.  History of hepatocellular carcinoma - s/p TACE 2019 and liver transplant 2019     Contact:            Ok to leave message regarding scheduling, medical, and billing per consent to communicate dated 10/2/19                              OK to speak with Davi Saunders (friend/POA) regarding scheduling, medical, and billing per consent to communicate dated 10/2/19    Anemia Latest Ref Rng & Units 3/9/2020 3/19/2020 3/25/2020 2020 2020 2020 2020   IVELISSE Dose - - - 40mcg 40mcg 40mcg - 40mcg   Hemoglobin 13.3 - 17.7 g/dL 8.8(L) 8.7(L) 8.6(L) 9.1(L) 10.2(L) 10.2(A) -   TSAT 15 - 46 % 40 - - - 30 - -   Ferritin 26 - 388 ng/mL 859(H) - - - 643(H) - -   PRBCs - - - - - - - -     BP Readings from Last 3 Encounters:   20 (!) 155/78   20 (!) 145/77   20 136/77     Wt Readings from Last 2 Encounters:   20 72.8 kg (160 lb 8 oz)   20 70 kg (154 lb 4.8 oz)           ASSESSMENT:  Hgb:At goal - recommend dose  TSat: at goal >30% Ferritin: At goal (>100ng/mL)    PLAN:  Dose with aranesp and RTC for hgb then aranesp if needed in 2 week(s)    Orders needed to be renewed (for next follow-up date) in EPIC: None    Iron labs due:  4 weeks    Plan discussed with:  No call  made. Appt sheduled for 5/20/20 at 8am  Plan provided by:  adri    NEXT FOLLOW-UP DATE:  5/20/20    Jie Blum RN   [facility]  [address] [address]   [email or web address]   Office : [phone]  Fax: [phone]

## 2020-05-08 NOTE — PROGRESS NOTES
Infusion Nursing Note:  Frandy Workman presents today for Aranesp injection.    Patient seen by provider today: No   present during visit today: Not Applicable.    Note: Assessment performed by Sandie PHELAN RN prior to injection today. Patient denies concerns following last injection. He states he thinks this will be the last injection he will need to be above the treatment threshold.     Intravenous Access:  No Intravenous access/labs at this visit.    Treatment Conditions:    Give if < 11.5, Hold if SBP >180  Lab Results   Component Value Date    HGB 10.2 05/06/2020     Lab Results   Component Value Date    WBC 5.0 04/22/2020      Lab Results   Component Value Date    ANEU 1.1 01/24/2020     Lab Results   Component Value Date     04/22/2020      Results reviewed, labs MET treatment parameters, ok to proceed with treatment.      Post Infusion Assessment:  Patient tolerated injection without incident.  Site patent and intact, free from redness, edema or discomfort.       Discharge Plan:   Patient discharged in stable condition accompanied by: self.  Departure Mode: Ambulatory.    Emilie Otero LPN

## 2020-05-11 ENCOUNTER — TELEPHONE (OUTPATIENT)
Dept: BEHAVIORAL HEALTH | Facility: CLINIC | Age: 55
End: 2020-05-11

## 2020-05-11 ENCOUNTER — VIRTUAL VISIT (OUTPATIENT)
Dept: BEHAVIORAL HEALTH | Facility: CLINIC | Age: 55
End: 2020-05-11
Payer: MEDICARE

## 2020-05-11 DIAGNOSIS — F33.0 MILD RECURRENT MAJOR DEPRESSION (H): Primary | ICD-10-CM

## 2020-05-11 NOTE — TELEPHONE ENCOUNTER
"I called Miller about a referral the [facility] Transplant team made for a psychiatric consultation with Dr. Davison. He asked where I was calling from and when I told him I was calling from Integrated Behavioral Health at the [facility] he asked \"Are you going to tell me I'm crazy too?! I've had 14 police officers here in the last week telling me that.\" He then hung up.    "

## 2020-05-11 NOTE — PROGRESS NOTES
"[facility]: Integrated Behavioral Health  May 11, 2020      Behavioral Health Clinician Progress Note    Patient Name: Frandy Workman           Service Type: Phone Visit      Service Location:  Phone visit      Session Start Time: 1:21  Session End Time: 2:05      Session Length: 38 - 52      Attendees: Patient    Visit Activities (Refresh list every visit): Phone Encounter  Frandy Workman is a 55 year old male who is being evaluated via a telephone visit.      The patient has been notified of the following:     \"We have found that certain health care needs can be provided without the need for a face to face visit.  This service lets us provide the care you need with a short phone conversation.      I will have full access to your [place] medical record during this entire phone call.   I will be taking notes for your medical record.     Since this is like an office visit, we will bill your insurance company for this service.  Please check with your medical insurance if this type of telephone visit/virtual care is covered.  You may be responsible for the cost of this service if insurance coverage is denied.      There are potential benefits and risks of telephone visits (e.g. limits to patient confidentiality) that differ from in-person visits.?  Confidentiality still applies for telephone services, and nobody will record the visit.  It is important to be in a quiet, private space that is free of distractions (including cell phone or other devices) during the visit.??     If during the course of the call I believe a telephone visit is not appropriate, you will not be charged for this service\"    Consent has been obtained for this service by care team member: yes.    Diagnostic Assessment Date: TBD  Treatment Plan Review Date: TBD  See Flowsheets for today's PHQ-9 and LIZZETTE-7 results  Previous PHQ-9:   PHQ-9 SCORE 1/9/2020   PHQ-9 Total Score 16     Previous LIZZETTE-7: No flowsheet " "data found.    HEATH LEVEL:  No flowsheet data found.    DATA  Extended Session (60+ minutes): No  Interactive Complexity: No  Crisis: No    Treatment Objective(s) Addressed in This Session:  Target Behavior(s): disease management/lifestyle changes      Discuss reasons for [facility] referral and determine treatment options.     Current Stressors / Issues:  Talked with Miller for approximately 45 minutes today. [facility] had called patient at 1:10 and Miller requested a call back in about 10 minutes. Miller discussed at length his dissatisfaction with his care at [facility], using various expletives toward staff and our health care system. Reviewed several of the events over the last several weeks and assessed for his level of insight to why certain safety precautions were taken (e.g. police being called). Miller continues to be insistent that bipolar disorder is not an appropriate diagnosis for him.  He reports wishing to discontinue services with [facility], however notes he is interested in continuing with me for one additional session and continuing with Dr. Banuelos.    At times, Miller went on a few tangents about politics and his thoughts about the COVID-19 pandemic.     We discussed options for long-term therapy and specialized care. Miller was agreeable to continue counseling with me for an additional session and requested referrals to providers outside the [facility]. I mainly provided Miller active listening today and supportive counseling to continue establishing a therapeutic relationship. Miller reported it was \"very helpful to vent\" and notes he believes he could continue to benefit from individual therapy. He is understanding about my role as a [facility] and being unable to do long-term therapy care.     Miller was insistent that suicidal or homicidal ideation is not a problem for him today or recently. He denied any safety concerns today, including having any homicidal or suicidal plans. While he did sound angry/upset on the phone, " he notes feeling positive about our interaction and appeared future-oriented.     Progress on Treatment Objective(s) / Homework:  No improvement - PRECONTEMPLATION (Not seeing need for change); Intervened by educating the patient about the effects of current behavior on health.  Evoked information about reasons to continue behavior, express concern / recommendations, and explored any change talk    Supportive Counseling with emphasis on active listening and rapport building today.     Care Plan review completed: No    Medication Review:  No changes to current psychiatric medication(s)    Medication Compliance:  NA - not addressed today    Changes in Health Issues:   None reported    Chemical Use Review:   Substance Use: Chemical use reviewed, no active concerns identified      Tobacco Use: No current tobacco use.      Assessment: Current Emotional / Mental Status (status of significant symptoms):  Risk status (Self / Other harm or suicidal ideation)  Patient denies a history of suicidal ideation, suicide attempts, self-injurious behavior, homicidal ideation, homicidal behavior and and other safety concerns  Patient denies current fears or concerns for personal safety.  Patient denies current or recent suicidal ideation or behaviors.  Patient denies current or recent homicidal ideation or behaviors.  Patient denies current or recent self injurious behavior or ideation.  Patient denies other safety concerns.  A safety and risk management plan has not been developed at this time, however patient was encouraged to call [place] [phone] should there be a change in any of these risk factors.    Appearance:   Appropriate   Eye Contact:   Good   Psychomotor Behavior: Agitated   Attitude:   Cooperative  Guarded  Mychal  Orientation:   All  Speech   Rate / Production: Talkative   Volume:  Loud   Mood:    Hypomanic   Affect:    Expansive   Thought Content:  Grandiose  Thought Form:  Tangential   Insight:    Poor      Diagnoses:  1. Mild recurrent major depression (H)     (per records and reported history)     Collateral Reports Completed:  Not Applicable    Plan: (Homework, other):  Will proceed with one additional [facility] session and provide referrals for mental health care, outside the [facility] as this was requested by Miller. As discussed during today's session, more specialized/long-term mental health care appears more appropriate for him. More in-depth psychological testing could also prove beneficial, as this could help objectively determine diagnoses and provide further treatment recommendations. There were no observed safety concerns today. Will continue monitoring for safety.     Joseph Murillo, RED  5/11/2020

## 2020-05-12 ENCOUNTER — TELEPHONE (OUTPATIENT)
Dept: TRANSPLANT | Facility: CLINIC | Age: 55
End: 2020-05-12

## 2020-05-12 DIAGNOSIS — Z94.4 LIVER TRANSPLANT RECIPIENT (H): ICD-10-CM

## 2020-05-12 DIAGNOSIS — N17.9 AKI (ACUTE KIDNEY INJURY) (H): ICD-10-CM

## 2020-05-12 RX ORDER — VALGANCICLOVIR 450 MG/1
450 TABLET, FILM COATED ORAL EVERY OTHER DAY
Qty: 15 TABLET | Refills: 1 | OUTPATIENT
Start: 2020-05-12

## 2020-05-12 RX ORDER — MAGNESIUM OXIDE 400 MG/1
400 TABLET ORAL EVERY EVENING
Qty: 90 TABLET | Refills: 3 | Status: SHIPPED | OUTPATIENT
Start: 2020-05-12 | End: 2021-01-20

## 2020-05-12 NOTE — TELEPHONE ENCOUNTER
Attempted to reach patient at 1130 am as he requested and patient did not answer. Left message for patient to return call to discuss stopping some medications.

## 2020-05-12 NOTE — TELEPHONE ENCOUNTER
Spoke with patient. He reports that he was served by a  with some paperwork that made him angry. Patient did send paperwork and his daughter, Negra, filed a restraining order on him. He is very upset because she labeled stuff that was false.     Patient was a lot calmer, but did use a lot of expletives but was not mad at this caller. He did state that he feels he mistreated Dilan, , and would like to chat with her tomorrow and asked that she call him tomorrow. Patient is working on buying a house to get out of [place] as it is near his daughter's high school and house and he and his Davi ORANTES feel this is the best option for them.     Did med review with Miller. His medication card is not easy to read. Will send new cards out to patient. Patient is agreeable and is having hard time with his med set up when it's like this. Patient says he is taking 5 mg zyprexa at this point. He slept from 9:30 pm to 8 am today.    Spoke with Davi ORANTES, and he reports him and Miller are talking and he is aware that Miller is looking at a house to get out of the neighborhood and feels the Zyprexa is making a difference.

## 2020-05-12 NOTE — TELEPHONE ENCOUNTER
Contacted patient to make medication changes now that he is 6 months post. Patient was offended that I did not call him yesterday when he was exactly 6 months. Patient was unable to find his med list and requested to call back in 5 mins. Contacted him 5 mins later and he is still unable to find his medication card. He requested to call him back at 930 am.     930 am contacted patient and he cannot find the med card and asked that this writer contact him again at 1130 am.

## 2020-05-13 ENCOUNTER — TELEPHONE (OUTPATIENT)
Dept: TRANSPLANT | Facility: CLINIC | Age: 55
End: 2020-05-13

## 2020-05-13 NOTE — TELEPHONE ENCOUNTER
Left message for patient to confirm scheduled phychiatric  appointments on 6/8/20  Asked patient to call back to confirm appointments dates and times.

## 2020-05-13 NOTE — TELEPHONE ENCOUNTER
Received a call from patient stating that he is in half-way, but when the call was transferred from admin the call was dropped. [facility]   Spoke with booking office.[phone] and talked with officer. Patient cannot be bailed out and has to attend court in the morning. Updated officer that he needs to his medications as they are life threatening if he goes without and he stated that someone needs to drop them off. Explained that he does not have any family in [place] to bring them to him. He transferred to medical division, but nobody answered and unable to leave a message. Called back and requested to speak to medical division and they did not answer was able to leave a message.     541pm: attempted to reach medical division and no answer.     626pm: attempted to reach medical division of half-way and no answer. No answer from booking dept either.     7:00 PM spoke with CHAVEZ Torres at [facility] and she stated only meds allowed are tacrolimus and insulin and if he stays longer than one night they will allow the psych meds.

## 2020-05-13 NOTE — TELEPHONE ENCOUNTER
Received a call from pharmacy that he needed a call from his coordinator right away. Patient did not answer. Left VM.

## 2020-05-13 NOTE — TELEPHONE ENCOUNTER
Transplant Social Work Services Phone Call      Data: Referral received from Radha Ndiaye RN Transplant Coordinator, to call patient.  Intervention: I attempted to reach patient but was unable.  I left him a voice message with my contact information.  Assessment: deferred  Education provided by SW: deferred  Plan: Awaiting call back from patient.      ALEXYS Mcnair, [facility]  Liver Transplant   Phone [phone]  Pager [phone]

## 2020-05-14 ENCOUNTER — DOCUMENTATION ONLY (OUTPATIENT)
Dept: TRANSPLANT | Facility: CLINIC | Age: 55
End: 2020-05-14

## 2020-05-14 ENCOUNTER — TELEPHONE (OUTPATIENT)
Dept: TRANSPLANT | Facility: CLINIC | Age: 55
End: 2020-05-14

## 2020-05-14 NOTE — TELEPHONE ENCOUNTER
Patient Call: Voicemail  Date/Time: 5/13/2020  0138  Reason for call: Pt stated he is being arrested for violating court order   They will feed him then book him  He stated he refused his meds due to them not being qualified

## 2020-05-14 NOTE — PROGRESS NOTES
I updated Beto with [facility] ([phone]) with patient's status.      ALEXYS Mcnair, [facility]  Liver Transplant   Phone [phone]  Pager [phone]

## 2020-05-14 NOTE — TELEPHONE ENCOUNTER
"Spoke with patient to let him know that can do heart monitor for his heart palpitations per DR welch. Patient also wants to be referred to a cardiologist with a specific name to manage his \"heart condition that he has.\"     Patient then stated he did take his medicine as the penitentiary nurse did tell him that she did speak with me. He did take it a little late though. Patient is up to date with his meds and his insulin.     Patient reports he is going on a plane tonight and will be back in 24 hours and he knows we do not approve him traveling, but he reports he just has to do this. Encouraged patient to not get on plane, but he was at the store when he was to get on plane in an hour.     Patient has next court date July 8th to follow up on violation of restraining order.   "

## 2020-05-15 DIAGNOSIS — R00.2 PALPITATIONS: Primary | ICD-10-CM

## 2020-05-16 ENCOUNTER — TELEPHONE (OUTPATIENT)
Dept: TRANSPLANT | Facility: CLINIC | Age: 55
End: 2020-05-16

## 2020-05-16 NOTE — TELEPHONE ENCOUNTER
"Received a call from pt's Davi ORANTES, that he just received an odd call that he feels Jess is being scammed for a lot of money. He said Jess told him that his \"lady friend\" is being kidnapped and he needs to pay 1,000 for her to be able to leave the area. She is to leave on a plane to come to [facility]. Davi is almost at [facility] to stop any charges. Davi also mentioned that patient was to leave for [place] on Friday and he asked the banker where he could go in [place] to withdrawal $10,000 to  Give to someone in need. Art said jess is swearing and not being the charles that he knows.   Contacted Jess. He is with Ansley and Sonny and they are going to be near him at his new place when he moves. They confirm that Jess did call [place] Police about Lo and had them discuss situation with Jess and the police told Jess this is a known scam and not do this steam card to them. Jess reports that he met Lo Tucker on [facility] yuval and if's a scam then oh well he doesn't care. He says they are demanding 1,000, but he will give 1,500 dollars to them. Patient is very angry and hung up on this caller.    Updated Art that he is using his platinum card to give these people 1,500 dollars and he is hoping the bank can stop these charges. Art would like Jess to be committed, but he does not know how. Patient was discharged from the ED with increased Zyprexa and arrested the next day and now he has this going on. Davi is frustrated and not knowing where to go.     Message to DR Banuelos, Dr Murillo, Dr Davison and DR Tucker and Dilan, . Left message for Beto adult protection.  "

## 2020-05-18 ENCOUNTER — DOCUMENTATION ONLY (OUTPATIENT)
Dept: TRANSPLANT | Facility: CLINIC | Age: 55
End: 2020-05-18

## 2020-05-18 NOTE — PROGRESS NOTES
Transplant Social Work Services Phone Call      Data: I received additional concerns about patient's behaviors over the weekend from Radha Ndiaye, Liver Transplant Coordinator.  Intervention: I contacted Beto with [facility] ([phone]) and reviewed documentation from patient's transplant coordinator, Radha Ndiaye. I sought consultation with my social work colleague.   Assessment: There are ongoing concerns about patient's physical and mental health.  [facility] is involved and actively working on patient's case.  Plan: Beto will contact [facility] intake and review patient's case for consideration of a petition for commitment.  Beto will also contact Davi Saunders, Health Care Agent, to provide an update and offer resources should Art want to pursue guardianship or conservatorship.    This message with be shared with team members: Dr. Tukcer, Dr. Banuelos, Dr. Davison, Dr. Murillo and Radha Ndiaye.      ALEXYS Mcnair, [facility]  Liver Transplant   Phone [phone]  Pager [phone]

## 2020-05-18 NOTE — PROGRESS NOTES
Addendum on 5/18/20: Received call back from Beto with [facility].  He did consult with [facility] intake.  They will not pursue commitment given the current information.  Beto also contacted Davi Saunders and provided him with education and resources.        ALEXYS Mcnair, [facility]  Liver Transplant   Phone [phone]  Pager [phone]

## 2020-05-19 ENCOUNTER — TELEPHONE (OUTPATIENT)
Dept: ENDOCRINOLOGY | Facility: CLINIC | Age: 55
End: 2020-05-19

## 2020-05-19 ENCOUNTER — TELEPHONE (OUTPATIENT)
Dept: TRANSPLANT | Facility: CLINIC | Age: 55
End: 2020-05-19

## 2020-05-19 NOTE — TELEPHONE ENCOUNTER
"Spoke with patient. He is very angry with this writer stating that \"you have ruined my life. You had me arrested against my family.\" \"I do not have biopolar. You need to fucking remove this from my record. Are you stupid? Do you know not know what bipolar is? You need to be on lithium to have diagnosis of bipolar. I am not fucking taking lithum.\"   \"I have a heart condition and you do not know how to handle it.\"  Nobody has scheduled his heart monitor appt last week.     Patient reports \" I am fucking done with you. I am having my  at your door on Monday morning. I will not  Deal with you as you will not remove bipolar off my list.\"    Message to DR Banuelos, DR Tucker, Dr Murillo, DR Davison and  Dilan.    "

## 2020-05-19 NOTE — TELEPHONE ENCOUNTER
[facility]    Phone Message    May a detailed message be left on voicemail: yes     Reason for Call: Order(s): Other:   Reason for requested: Needing to get a CMN Certificate for medically necessity  for [employer] medical equipment     Fax: [phone]  Date needed: asap  Provider name: Dorcas      Action Taken: Message routed to:  [facility] Endo    Travel Screening: Not Applicable

## 2020-05-19 NOTE — TELEPHONE ENCOUNTER
Patient Call: Voicemail  Date/Time: 5/18/2020  3634  Reason for call: Pt has questions to review with Crystal

## 2020-05-20 ENCOUNTER — VIRTUAL VISIT (OUTPATIENT)
Dept: BEHAVIORAL HEALTH | Facility: CLINIC | Age: 55
End: 2020-05-20
Payer: MEDICARE

## 2020-05-20 ENCOUNTER — TELEPHONE (OUTPATIENT)
Dept: TRANSPLANT | Facility: CLINIC | Age: 55
End: 2020-05-20

## 2020-05-20 DIAGNOSIS — F33.0 MILD RECURRENT MAJOR DEPRESSION (H): Primary | ICD-10-CM

## 2020-05-20 NOTE — PROGRESS NOTES
"[facility] Integrated Behavioral Health  May 20, 2020      Behavioral Health Clinician Progress Note    Patient Name: Frandy Workman           Service Type: Phone Visit      Service Location:  Phone visit      Session Start Time: 9:00  Session End Time: 9:35      Session Length: 16 - 37      Attendees: Patient    Visit Activities (Refresh list every visit): Phone Encounter  Frandy Workman is a 55 year old male who is being evaluated via a telephone visit.      The patient has been notified of the following:     \"We have found that certain health care needs can be provided without the need for a face to face visit.  This service lets us provide the care you need with a short phone conversation.      I will have full access to your [facility] medical record during this entire phone call.   I will be taking notes for your medical record.     Since this is like an office visit, we will bill your insurance company for this service.  Please check with your medical insurance if this type of telephone visit/virtual care is covered.  You may be responsible for the cost of this service if insurance coverage is denied.      There are potential benefits and risks of telephone visits (e.g. limits to patient confidentiality) that differ from in-person visits.?  Confidentiality still applies for telephone services, and nobody will record the visit.  It is important to be in a quiet, private space that is free of distractions (including cell phone or other devices) during the visit.??     If during the course of the call I believe a telephone visit is not appropriate, you will not be charged for this service\"    Consent has been obtained for this service by care team member: yes.    Diagnostic Assessment Date: NA  Treatment Plan Review Date: NA  See Flowsheets for today's PHQ-9 and LIZZETTE-7 results  Previous PHQ-9:   PHQ-9 SCORE 1/9/2020   PHQ-9 Total Score 16     Previous LIZZETTE-7: No flowsheet data " found.    HEATH LEVEL:  No flowsheet data found.    DATA  Extended Session (60+ minutes): No  Interactive Complexity: No  Crisis: No    Treatment Objective(s) Addressed in This Session:  Target Behavior(s): disease management/lifestyle changes      Discuss reasons for [facility] referral and determine treatment options.     Current Stressors / Issues:  Miller today reports doing well. He denied any safety concerns and shared how healthy he believes he is today. Spent time in the session today discussing treatment options for his mental health. Recommended to Miller neuropsychological testing to evaluate for cognitive and emotional factors that could be impacting his quality of life. Additionally explained what neuropsychological testing entails in terms of testing and average length of the exam. Miller agreed to this, noting he believes this is a good idea for him. Additionally discussed option for more specialized mental health care. Discussed option for a referral to health psychology and explained the difference between [facility] services and health psychology. Miller notes that while he likes talking to this writer, he believes health psychology would be helpful. He states talk therapy does seem to calm his mood down significantly.     Progress on Treatment Objective(s) / Homework:  No improvement - CONTEMPLATION (Considering change and yet undecided); Intervened by assessing the negative and positive thinking (ambivalence) about behavior change    Supportive Counseling with emphasis on active listening and rapport building today.     Discussed treatment options for more specialized care and further neuropsychological evaluation    Care Plan review completed: No    Medication Review:  No changes to current psychiatric medication(s)    Medication Compliance:  NA - not addressed today    Changes in Health Issues:   None reported    Chemical Use Review:   Substance Use: Chemical use reviewed, no active concerns identified      Tobacco Use:  No current tobacco use.      Assessment: Current Emotional / Mental Status (status of significant symptoms):  Risk status (Self / Other harm or suicidal ideation)  Patient denies a history of suicidal ideation, suicide attempts, self-injurious behavior, homicidal ideation, homicidal behavior and and other safety concerns  Patient denies current fears or concerns for personal safety.  Patient denies current or recent suicidal ideation or behaviors.  Patient denies current or recent homicidal ideation or behaviors.  Patient denies current or recent self injurious behavior or ideation.  Patient denies other safety concerns.     A safety and risk management plan has not been developed at this time, however patient was encouraged to call [place] [phone] should there be a change in any of these risk factors.    Appearance:   Appropriate   Eye Contact:   Good   Psychomotor Behavior: Agitated   Attitude:   Cooperative  Mychal  Orientation:   All  Speech   Rate / Production: Talkative   Volume:  Loud   Mood:    Hypomanic   Affect:    Expansive   Thought Content:  Grandiose  Thought Form:  Tangential   Insight:    Poor     Diagnoses:  1. Mild recurrent major depression (H)     (per records and reported history)     Collateral Reports Completed:  Not Applicable    Plan: (Homework, other):  Miller agreed to a referral for a neuropsychological evaluation and for more specialized mental health care with the health psychology department. Will consult with patient's care team about these options. No safety concerns or CD issues identified today.   .     Joseph Murillo LP  5/20/2020

## 2020-05-20 NOTE — TELEPHONE ENCOUNTER
Transplant Social Work Services Phone Call      Data: I received a voice message from Miller requesting a call back.  Intervention: I returned Miller's call.  Assessment: Miller called to apologize because he believes he mistreated me.  He thanked me for the impact I've had on his life.    Miller talked about recent legal problems he's had, and reports incite and understanding of the consequences of his behaviors. Miller also states he has accepted the relationship with his daughter Karrie is what it is.      Miller has rehired Gabrielle as a care giver, and she is providing care giving on Tuesdays, Thursdays and Sundays for 8 hours each day.  Gabrielle is assisting Miller with his medication management.  Miller had a second appointments with Dr. Murillo, [facility], today and he wants to continue with mental health therapy.  He has also agreed to neuropsychological testing.  Plan: I will remain involved for the psychosocial needs of this patient.        ALEXYS Mcnair, [facility]  Liver Transplant   Phone [phone]  Pager [phone]

## 2020-05-21 ENCOUNTER — INFUSION THERAPY VISIT (OUTPATIENT)
Dept: INFUSION THERAPY | Facility: CLINIC | Age: 55
End: 2020-05-21
Payer: MEDICARE

## 2020-05-21 ENCOUNTER — TELEPHONE (OUTPATIENT)
Dept: PHARMACY | Facility: CLINIC | Age: 55
End: 2020-05-21

## 2020-05-21 ENCOUNTER — TELEPHONE (OUTPATIENT)
Dept: INTERNAL MEDICINE | Facility: CLINIC | Age: 55
End: 2020-05-21

## 2020-05-21 VITALS
RESPIRATION RATE: 16 BRPM | OXYGEN SATURATION: 100 % | SYSTOLIC BLOOD PRESSURE: 134 MMHG | BODY MASS INDEX: 23.17 KG/M2 | WEIGHT: 156.9 LBS | TEMPERATURE: 97.3 F | HEART RATE: 79 BPM | DIASTOLIC BLOOD PRESSURE: 77 MMHG

## 2020-05-21 DIAGNOSIS — Z94.4 LIVER REPLACED BY TRANSPLANT (H): ICD-10-CM

## 2020-05-21 DIAGNOSIS — Z94.4 STATUS POST LIVER TRANSPLANTATION (H): ICD-10-CM

## 2020-05-21 DIAGNOSIS — D63.1 ANEMIA DUE TO STAGE 4 CHRONIC KIDNEY DISEASE (H): ICD-10-CM

## 2020-05-21 DIAGNOSIS — N18.4 CKD (CHRONIC KIDNEY DISEASE) STAGE 4, GFR 15-29 ML/MIN (H): Primary | ICD-10-CM

## 2020-05-21 DIAGNOSIS — Z13.220 LIPID SCREENING: ICD-10-CM

## 2020-05-21 DIAGNOSIS — N18.4 CKD (CHRONIC KIDNEY DISEASE) STAGE 4, GFR 15-29 ML/MIN (H): ICD-10-CM

## 2020-05-21 DIAGNOSIS — N18.4 ANEMIA OF CHRONIC RENAL FAILURE, STAGE 4 (SEVERE) (H): ICD-10-CM

## 2020-05-21 DIAGNOSIS — C22.0 HCC (HEPATOCELLULAR CARCINOMA) (H): ICD-10-CM

## 2020-05-21 DIAGNOSIS — N18.4 ANEMIA DUE TO STAGE 4 CHRONIC KIDNEY DISEASE (H): ICD-10-CM

## 2020-05-21 DIAGNOSIS — D63.1 ANEMIA OF CHRONIC RENAL FAILURE, STAGE 4 (SEVERE) (H): ICD-10-CM

## 2020-05-21 LAB
AFP SERPL-MCNC: <1.5 UG/L (ref 0–8)
ALBUMIN SERPL-MCNC: 4.1 G/DL (ref 3.4–5)
ALP SERPL-CCNC: 107 U/L (ref 40–150)
ALT SERPL W P-5'-P-CCNC: 20 U/L (ref 0–70)
ANION GAP SERPL CALCULATED.3IONS-SCNC: 6 MMOL/L (ref 3–14)
AST SERPL W P-5'-P-CCNC: 13 U/L (ref 0–45)
BASOPHILS # BLD AUTO: 0 10E9/L (ref 0–0.2)
BASOPHILS NFR BLD AUTO: 0.7 %
BILIRUB DIRECT SERPL-MCNC: <0.1 MG/DL (ref 0–0.2)
BILIRUB SERPL-MCNC: 0.3 MG/DL (ref 0.2–1.3)
BUN SERPL-MCNC: 40 MG/DL (ref 7–30)
CALCIUM SERPL-MCNC: 9.4 MG/DL (ref 8.5–10.1)
CHLORIDE SERPL-SCNC: 108 MMOL/L (ref 94–109)
CO2 SERPL-SCNC: 28 MMOL/L (ref 20–32)
CREAT SERPL-MCNC: 1.16 MG/DL (ref 0.66–1.25)
DIFFERENTIAL METHOD BLD: ABNORMAL
EOSINOPHIL # BLD AUTO: 0.1 10E9/L (ref 0–0.7)
EOSINOPHIL NFR BLD AUTO: 3.3 %
ERYTHROCYTE [DISTWIDTH] IN BLOOD BY AUTOMATED COUNT: 14.2 % (ref 10–15)
FERRITIN SERPL-MCNC: 344 NG/ML (ref 26–388)
GFR SERPL CREATININE-BSD FRML MDRD: 70 ML/MIN/{1.73_M2}
GLUCOSE SERPL-MCNC: 187 MG/DL (ref 70–99)
HCT VFR BLD AUTO: 32.7 % (ref 40–53)
HGB BLD-MCNC: 11.1 G/DL (ref 13.3–17.7)
IMM GRANULOCYTES # BLD: 0 10E9/L (ref 0–0.4)
IMM GRANULOCYTES NFR BLD: 0.7 %
IRON SATN MFR SERPL: 38 % (ref 15–46)
IRON SERPL-MCNC: 72 UG/DL (ref 35–180)
LYMPHOCYTES # BLD AUTO: 0.5 10E9/L (ref 0.8–5.3)
LYMPHOCYTES NFR BLD AUTO: 11.4 %
MAGNESIUM SERPL-MCNC: 1.7 MG/DL (ref 1.6–2.3)
MCH RBC QN AUTO: 34.9 PG (ref 26.5–33)
MCHC RBC AUTO-ENTMCNC: 33.9 G/DL (ref 31.5–36.5)
MCV RBC AUTO: 103 FL (ref 78–100)
MONOCYTES # BLD AUTO: 0.3 10E9/L (ref 0–1.3)
MONOCYTES NFR BLD AUTO: 6.5 %
NEUTROPHILS # BLD AUTO: 3.3 10E9/L (ref 1.6–8.3)
NEUTROPHILS NFR BLD AUTO: 77.4 %
PHOSPHATE SERPL-MCNC: 3 MG/DL (ref 2.5–4.5)
PLATELET # BLD AUTO: 123 10E9/L (ref 150–450)
POTASSIUM SERPL-SCNC: 4.1 MMOL/L (ref 3.4–5.3)
PROT SERPL-MCNC: 7.3 G/DL (ref 6.8–8.8)
RBC # BLD AUTO: 3.18 10E12/L (ref 4.4–5.9)
SODIUM SERPL-SCNC: 142 MMOL/L (ref 133–144)
TACROLIMUS BLD-MCNC: 4.7 UG/L (ref 5–15)
TIBC SERPL-MCNC: 192 UG/DL (ref 240–430)
TME LAST DOSE: ABNORMAL H
WBC # BLD AUTO: 4.3 10E9/L (ref 4–11)

## 2020-05-21 PROCEDURE — 80197 ASSAY OF TACROLIMUS: CPT | Performed by: SURGERY

## 2020-05-21 PROCEDURE — 36415 COLL VENOUS BLD VENIPUNCTURE: CPT | Performed by: INTERNAL MEDICINE

## 2020-05-21 PROCEDURE — 83540 ASSAY OF IRON: CPT | Performed by: INTERNAL MEDICINE

## 2020-05-21 PROCEDURE — 84100 ASSAY OF PHOSPHORUS: CPT | Performed by: INTERNAL MEDICINE

## 2020-05-21 PROCEDURE — 83735 ASSAY OF MAGNESIUM: CPT | Performed by: INTERNAL MEDICINE

## 2020-05-21 PROCEDURE — 80053 COMPREHEN METABOLIC PANEL: CPT | Performed by: INTERNAL MEDICINE

## 2020-05-21 PROCEDURE — 96372 THER/PROPH/DIAG INJ SC/IM: CPT | Performed by: NURSE PRACTITIONER

## 2020-05-21 PROCEDURE — 85025 COMPLETE CBC W/AUTO DIFF WBC: CPT | Performed by: INTERNAL MEDICINE

## 2020-05-21 PROCEDURE — 82728 ASSAY OF FERRITIN: CPT | Performed by: INTERNAL MEDICINE

## 2020-05-21 PROCEDURE — 82105 ALPHA-FETOPROTEIN SERUM: CPT | Performed by: INTERNAL MEDICINE

## 2020-05-21 PROCEDURE — 99207 ZZC NO CHARGE NURSE ONLY: CPT

## 2020-05-21 PROCEDURE — 82248 BILIRUBIN DIRECT: CPT | Performed by: INTERNAL MEDICINE

## 2020-05-21 PROCEDURE — 83550 IRON BINDING TEST: CPT | Performed by: INTERNAL MEDICINE

## 2020-05-21 ASSESSMENT — PAIN SCALES - GENERAL: PAINLEVEL: SEVERE PAIN (7)

## 2020-05-21 NOTE — PROGRESS NOTES
Infusion Nursing Note:  Frandy Workman presents today for Aranesp.    Patient seen by provider today: No   present during visit today: Not Applicable.    Note: Reports increased energy.    Intravenous Access:  No Intravenous access/labs at this visit.    Treatment Conditions:  Lab Results   Component Value Date    HGB 11.1 05/21/2020     Lab Results   Component Value Date    WBC 4.3 05/21/2020      Lab Results   Component Value Date    ANEU 3.3 05/21/2020     Lab Results   Component Value Date     05/21/2020      Results reviewed, labs MET treatment parameters, ok to proceed with treatment.  Give if hgb <11.5  Hold if SBP > 180. BP today 134/77      Post Infusion Assessment:  Patient tolerated injection without incident.       Discharge Plan:   Patient will return 6/3/20 for next appointment.   Patient discharged in stable condition accompanied by: self.  Departure Mode: Ambulatory.    Ekaterina Balbuena RN

## 2020-05-21 NOTE — TELEPHONE ENCOUNTER
----- Message from Valerie Dallas CMA sent at 5/21/2020  9:17 AM CDT -----  Regarding: FW: Zio patch  Hello,    Please see message below from Dr. Tucker about Zio patch scheduling.    Thank you!  Valerie   ----- Message -----  From: Maximo Tucker DO  Sent: 5/21/2020   1:27 AM CDT  To: [facility] Rooming Staff-  Subject: Zio patch                                        Hello,   I recently scheduled Miller to have a Zio patch for palpitations that he has been having. Would it be possible to help set this up?    He has recently been having a very hard time with social stressors and mental health and has been grossly manic over the past several weeks. He is having some mistrust in the medical system and may require several calls but I believe he will ultimately be agreeable (he was when I spoke with him).    Thank you for your help!

## 2020-05-21 NOTE — TELEPHONE ENCOUNTER
Form being faxed over for fourth time by vendor.  Please watch for it and take care of it right away when you have it.    Letter of medical necessity

## 2020-05-21 NOTE — TELEPHONE ENCOUNTER
Anemia Management Note  SUBJECTIVE/OBJECTIVE:  Referred by Dr. Eloy Rao on 3/2/2020  Primary Diagnosis: Anemia in Chronic Kidney Disease (N18.4, D63.1)     Secondary Diagnosis:  Chronic Kidney Disease, Stage 4 (N18.4)                 Liver Transplant: 2019  Hgb goal range:  10-11.5 per Dr. Rao's referral  Epo/Darbo: Aranesp 40mcg every 14 days for Hgb <11.5.  In Clinic.  Iron regimen:  Ferrous Sulfate 325mg once daily - was taking three times daily, decreased in   Labs : Hemoglobin - has standing order for CBC/platelets twice weekly - expires 2020  Iron Labs: 3/3/2021  Recent IVELISSE use, transfusion, IV iron: PRBC on 3/3  RX/TX plans : TBD  No history of stroke, MI and blood clots.  History of hepatocellular carcinoma - s/p TACE 2019 and liver transplant 2019     Contact:            Ok to leave message regarding scheduling, medical, and billing per consent to communicate dated 10/2/19                              OK to speak with Davi Saunders (friend/POA) regarding scheduling, medical, and billing per consent to communicate dated 10/2/19    Anemia Latest Ref Rng & Units 3/19/2020 3/25/2020 2020 2020 2020 2020 2020   IVELISSE Dose - - 40mcg 40mcg 40mcg - 40mcg 40mcg   Hemoglobin 13.3 - 17.7 g/dL 8.7(L) 8.6(L) 9.1(L) 10.2(L) 10.2(A) - 11.1(L)   TSAT 15 - 46 % - - - 30 - - 38   Ferritin 26 - 388 ng/mL - - - 643(H) - - 344   PRBCs - - - - - - - -     BP Readings from Last 3 Encounters:   20 134/77   20 (!) 155/78   20 (!) 145/77     Wt Readings from Last 2 Encounters:   20 71.2 kg (156 lb 14.4 oz)   20 72.8 kg (160 lb 8 oz)           ASSESSMENT:  Hgb:At goal - recommend dose  TSat: at goal >30% Ferritin: At goal (>100ng/mL)    PLAN:  Dose with aranesp and RTC for hgb then aranesp if needed in 2 week(s)    Orders needed to be renewed (for next follow-up date) in EPIC: None    Iron labs due:  4 weeks    Plan discussed with:  No  call made. Next appt scheduled for 6/3/20  Plan provided by:  adri    NEXT FOLLOW-UP DATE:  6/3/2020    Jie Blum RN   [facility]  [address] [address]   [email or web address]   Office : [phone]  Fax: [phone]

## 2020-05-21 NOTE — TELEPHONE ENCOUNTER
Clinical Pharmacy Consult:                                                      Transplant Specific:  6 Month Post Transplant Medication Review  Date of Transplant: 11/11/2019  Type of Transplant: liver  First Transplant: yes  History of rejection: no    Immunosuppression Regimen   TAC 4mg qAM & 4mg qPM  Patient specific goal: 6-8  Most recent level: 7.9, date 04/22/20  Immunosuppressant Levels:  Therapeutic  Pt adherent to lab draws: yes  Scr:   Lab Results   Component Value Date    CR 1.16 05/21/2020     Side effects: no side effects    Prophylactic Medications  Antibacterial:  Completed    Antifungal: Not needed thus far    Antiviral: Valcyte completed     Acid Reducer: Prilosec (omeprazole)  Scheduled Reviewed Date: up to MD    Thrombosis Prevention: Aspirin 81 mg PO daily  Scheduled Discontinue Date: up to MD    Blood Pressure Management  Frequency of home Blood Pressure checks: did not ask  Most recent home BP: did not ask  At clinic today 134/77  Patient Blood pressure goal: <140/90  Patient blood pressure at goal:  yes  Hospitalizations/ER visits since last assessment: 1 He went to ER.  He thought he was have a heart attack but was diagnosed with hypomania.       Med rec/DUR performed: yes  Med Rec Discrepancies: yes he said he was not taking magnesium or rosuvastatin.  Will contact coordinator.    He talked a lot.  He said he is feeling great.  It was difficult to review his meds because has them numbered and when I would mention one by name he would ask what number it was.  We made it thru.  He said he missed 5 tacro doses in 2 weeks.    Medication adherence flowsheet 5/21/2020   Patient medication administration: Has assistance with medications   Patient estimated adherence level: %   Pharmacist assessment of adherence: Poor   Patient reported doses missed per week: 5-7   Pharmacy MPR: -   Facilitators to medication adherence  Alarm;Medication dosing chart;Pill box   Patient reported barriers to  medication adherence  -   Adherence intervention(s): -      Medication access flowsheet 5/21/2020   Number of pharmacies used: 2   Pharmacy: [facility];Other   Other pharmacy: local [facility]   Enrolled in [facility] pharmacy? Yes   Patient reported barriers to accessing medications: -   Medication access interventions: -        Michael Fraire RP

## 2020-05-22 ENCOUNTER — ANCILLARY PROCEDURE (OUTPATIENT)
Dept: MRI IMAGING | Facility: CLINIC | Age: 55
End: 2020-05-22
Attending: INTERNAL MEDICINE
Payer: MEDICARE

## 2020-05-22 ENCOUNTER — ANCILLARY PROCEDURE (OUTPATIENT)
Dept: CT IMAGING | Facility: CLINIC | Age: 55
End: 2020-05-22
Attending: INTERNAL MEDICINE
Payer: MEDICARE

## 2020-05-22 ENCOUNTER — TELEPHONE (OUTPATIENT)
Dept: TRANSPLANT | Facility: CLINIC | Age: 55
End: 2020-05-22

## 2020-05-22 DIAGNOSIS — C22.0 HCC (HEPATOCELLULAR CARCINOMA) (H): ICD-10-CM

## 2020-05-22 DIAGNOSIS — Z94.4 STATUS POST LIVER TRANSPLANTATION (H): ICD-10-CM

## 2020-05-22 RX ORDER — GADOBUTROL 604.72 MG/ML
7.5 INJECTION INTRAVENOUS ONCE
Status: COMPLETED | OUTPATIENT
Start: 2020-05-22 | End: 2020-05-22

## 2020-05-22 RX ADMIN — GADOBUTROL 7 ML: 604.72 INJECTION INTRAVENOUS at 10:55

## 2020-05-22 NOTE — DISCHARGE INSTRUCTIONS
MRI Contrast Discharge Instructions    The IV contrast you received today will pass out of your body in your  urine. This will happen in the next 24 hours. You will not feel this process.  Your urine will not change color.    Drink at least 4 extra glasses of water or juice today (unless your doctor  has restricted your fluids). This reduces the stress on your kidneys.  You may take your regular medicines.    If you are on dialysis: It is best to have dialysis today.    If you have a reaction: Most reactions happen right away. If you have  any new symptoms after leaving the hospital (such as hives or swelling),  call your hospital at the correct number below. Or call your family doctor.  If you have breathing distress or wheezing, call 911.    Special instructions: ***    I have read and understand the above information.    Signature:______________________________________ Date:___________    Staff:__________________________________________ Date:___________     Time:__________    [place] [facility] Departments:    ___[place]: [phone]  ___[facility]: [phone]  ___[place]: [phone] ___[facility]: [phone]  ___[facility]: [phone]  ___[facility]: [phone]  ___[phone]  ___[facility]: [phone]  ___[phone]

## 2020-05-22 NOTE — TELEPHONE ENCOUNTER
Patient Call: Voicemail  Date/Time: 5/22/20 @ 2:21 PM  Reason for call: Called to discuss labs and a few other things

## 2020-05-22 NOTE — TELEPHONE ENCOUNTER
Attempted to reach Miller. His phone says it is not a working number. Sent a [employer] message to patient.    Bedside shift change report given to Amador RN (oncoming nurse) by April, RN (offgoing nurse). Report included the following information SBAR, Kardex, Intake/Output, MAR, Accordion and Cardiac Rhythm Sinus Tach.

## 2020-05-22 NOTE — TELEPHONE ENCOUNTER
Left Voicemail on 5/20/20 for this patient regarding moving in person visit to a virtual visit. Called again on 5/21/20 and the number [phone] is no longer in service. I am unable to reach this patient to convert this appointment.

## 2020-05-26 ENCOUNTER — ANCILLARY PROCEDURE (OUTPATIENT)
Dept: CARDIOLOGY | Facility: CLINIC | Age: 55
End: 2020-05-26
Attending: INTERNAL MEDICINE
Payer: MEDICARE

## 2020-05-26 ENCOUNTER — TELEPHONE (OUTPATIENT)
Dept: GASTROENTEROLOGY | Facility: CLINIC | Age: 55
End: 2020-05-26

## 2020-05-26 ENCOUNTER — ONCOLOGY VISIT (OUTPATIENT)
Dept: ONCOLOGY | Facility: CLINIC | Age: 55
End: 2020-05-26
Attending: NURSE PRACTITIONER
Payer: MEDICARE

## 2020-05-26 ENCOUNTER — VIRTUAL VISIT (OUTPATIENT)
Dept: GASTROENTEROLOGY | Facility: CLINIC | Age: 55
End: 2020-05-26
Attending: INTERNAL MEDICINE
Payer: MEDICARE

## 2020-05-26 VITALS
WEIGHT: 156.8 LBS | DIASTOLIC BLOOD PRESSURE: 78 MMHG | BODY MASS INDEX: 23.16 KG/M2 | SYSTOLIC BLOOD PRESSURE: 140 MMHG | RESPIRATION RATE: 16 BRPM | HEART RATE: 100 BPM | OXYGEN SATURATION: 100 %

## 2020-05-26 DIAGNOSIS — R00.2 PALPITATIONS: ICD-10-CM

## 2020-05-26 DIAGNOSIS — N18.30 CKD (CHRONIC KIDNEY DISEASE), STAGE III (H): ICD-10-CM

## 2020-05-26 DIAGNOSIS — Z94.4 LIVER TRANSPLANT RECIPIENT (H): Primary | ICD-10-CM

## 2020-05-26 DIAGNOSIS — Z94.4 STATUS POST LIVER TRANSPLANTATION (H): ICD-10-CM

## 2020-05-26 DIAGNOSIS — C22.0 HCC (HEPATOCELLULAR CARCINOMA) (H): Primary | ICD-10-CM

## 2020-05-26 PROCEDURE — 0298T ZIO PATCH HOLTER ADULT PEDIATRIC GREATER THAN 48 HRS: CPT | Mod: ZP | Performed by: INTERNAL MEDICINE

## 2020-05-26 PROCEDURE — 99213 OFFICE O/P EST LOW 20 MIN: CPT | Mod: ZP | Performed by: NURSE PRACTITIONER

## 2020-05-26 PROCEDURE — 0296T ZIO PATCH HOLTER ADULT PEDIATRIC GREATER THAN 48 HRS: CPT | Mod: ZF

## 2020-05-26 PROCEDURE — G0463 HOSPITAL OUTPT CLINIC VISIT: HCPCS

## 2020-05-26 ASSESSMENT — PAIN SCALES - GENERAL: PAINLEVEL: EXTREME PAIN (8)

## 2020-05-26 NOTE — LETTER
5/26/2020     RE: Frandy Workman  [address] [place] [id number]    Dear Colleague,    Thank you for referring your patient, Frandy Workman, to the [facility]. Please see a copy of my visit note below.    Reason for Visit: seen in f/u of hepatocellular carcinoma    Oncology HPI:   Miller Workman is a 55 year old man with a PMH of DMII, cirrhosis s/t ESTRADA, HLD, HTN, GERD, BPH. He as diagnosed with hepatocellular carcinoma in December 2018 when he was found to have 2 LIRADS 5 lesions on surveillance imaging. He underwent TACE on 1/22/19.  He is liver transplant on 11/11/2019. The explanted liver had 2 lesions that were mostly necrotic and less than 3 cm with no clearly identifiable vascular invasion.   He is here for routine surveillance today.    Interval history: Miller is feeling well.Feels his stamina and strength are not back to baseline, but improving. Appetite is good. Energy is good. No fevers/chills, cough, sob, cp, palpitation. Is not isolating or wearing a mask, feels the concerns over the pandemic are overblown. No bloating, abdominal pain. Bowel/bladder function are stable. Has some chronic ankle edema, no calf/thigh pain, but feels a little tenderness around the ankles.     Current Outpatient Medications   Medication Sig Dispense Refill     Acetaminophen (TYLENOL) 325 MG CAPS Take 325-650 mg by mouth every 4 hours as needed (pain, fever) 100 capsule 3     alpha-lipoic acid 600 MG capsule Take 1 capsule (600 mg) by mouth daily 90 capsule 3     Artificial Tear Solution (SM ARTIFICIAL TEARS) SOLN Place 1 drop into the right eye every hour as needed (dry eyes) Apply at least 4 times daily and as needed for dry eye 1 Bottle 3     aspirin 81 MG EC tablet Take 1 tablet (81 mg) by mouth daily       BD VIKTORIA U/F 32G X 4 MM insulin pen needle Use 5 per day 300 each 3     blood glucose (ACCU-CHEK EDINSON PLUS) test strip USE TO TEST FOUR TIMES DAILY OR AS DIRECTED 400 strip 6     blood  glucose monitoring (ACCU-CHEK FASTCLIX) lancets Use to test blood sugar 4 times daily or as directed.  1 box = 102 lancets 408 each 3     Continuous Blood Gluc  (FREESTYLE CLAUDIA 14 DAY READER) CECILIO Use to read blood sugars as per 's instructions. 1 each 0     Continuous Blood Gluc Sensor (FREESTYLE CLAUDIA 14 DAY SENSOR) MISC Change every 14 days. 2 each 11     econazole nitrate 1 % external cream Apply topically daily To feet and toenails. 85 g 0     ferrous sulfate (FEROSUL) 325 (65 Fe) MG tablet Take 1 tablet (325 mg) by mouth daily (with breakfast) 90 tablet 3     Glucagon 3 MG/DOSE POWD Spray 1 Dose in nostril as needed (for low blood sugar with sedation or emesis (unable to ingest glucose)) 1 each 1     glucose (BD GLUCOSE) 4 g chewable tablet Take 1 tablet by mouth every hour as needed for low blood sugar 60 tablet 1     insulin aspart (NOVOLOG PEN) 100 UNIT/ML pen Inject 1 unit : 8 grams carb + sliding scale 4 times daily Max units per day   80 units daily . 75 mL 3     insulin degludec (TRESIBA FLEXTOUCH) 100 UNIT/ML pen Inject 20 Units Subcutaneous daily 15 mL 0     insulin glargine (BASAGLAR KWIKPEN) 100 UNIT/ML pen Inject 28 Units Subcutaneous daily 30 mL 3     lamiVUDine (EPIVIR) 100 MG tablet Take 1 tablet (100 mg) by mouth daily 30 tablet 3     magnesium oxide (MAG-OX) 400 MG tablet Take 1 tablet (400 mg) by mouth every evening 90 tablet 3     Multiple Vitamin (THERAVITE PO) Take 1 tablet by mouth every morning        OLANZapine (ZYPREXA) 5 MG tablet Take 1 tablet (5 mg) by mouth At Bedtime 30 tablet 4     omeprazole (PRILOSEC) 40 MG DR capsule Take 1 capsule (40 mg) by mouth daily 90 capsule 2     rosuvastatin (CRESTOR) 5 MG tablet Take 1 tablet (5 mg) by mouth daily 30 tablet 3     tacrolimus (GENERIC EQUIVALENT) 1 MG capsule Take 4 capsules (4 mg) by mouth every 12 hours 240 capsule 11     tamsulosin (FLOMAX) 0.4 MG capsule TAKE 1 CAPSULE(0.4 MG) BY MOUTH DAILY 90 capsule 3      vitamin B-12 (CYANOCOBALAMIN) 1000 MCG tablet Take 1 tablet (1,000 mcg) by mouth daily 30 tablet 11     vitamin D3 (CHOLECALCIFEROL) 2000 units (50 mcg) tablet Take 1 tablet (2,000 Units) by mouth daily 90 tablet 3          Allergies   Allergen Reactions     Codeine Other (See Comments)     Cannot take due to liver  Cannot tolerate oral narcotics     Seasonal Allergies      Sneezing, coughing, runny and itchy eyes         Exam: alert, appears in NAD.  Blood pressure (!) 140/78, pulse 100, resp. rate 16, weight 71.1 kg (156 lb 12.8 oz), SpO2 100 %.  Wt Readings from Last 4 Encounters:   05/26/20 71.1 kg (156 lb 12.8 oz)   05/21/20 71.2 kg (156 lb 14.4 oz)   05/08/20 72.8 kg (160 lb 8 oz)   04/22/20 70 kg (154 lb 4.8 oz)     Oropharynx is moist and without lesion. Neck supple and without adenopathy. Lungs:CTA. Heart: RRR, no murmur or rub. Abdomen: soft, nontender, BS active, no masses or organomegaly.  Extremities: warm, 1+ BLE ankle edema. Speech is clear. CN wnl. Gait/station wnl. Skin: no rash on exposed skin       Labs: Results for MAGDY SANDERS (MRN [id number]) as of 5/26/2020 07:39   Ref. Range 5/21/2020 07:23   Sodium Latest Ref Range: 133 - 144 mmol/L 142   Potassium Latest Ref Range: 3.4 - 5.3 mmol/L 4.1   Chloride Latest Ref Range: 94 - 109 mmol/L 108   Carbon Dioxide Latest Ref Range: 20 - 32 mmol/L 28   Urea Nitrogen Latest Ref Range: 7 - 30 mg/dL 40 (H)   Creatinine Latest Ref Range: 0.66 - 1.25 mg/dL 1.16   GFR Estimate Latest Ref Range: >60 mL/min/1.73_m2 70   GFR Estimate If Black Latest Ref Range: >60 mL/min/1.73_m2 81   Calcium Latest Ref Range: 8.5 - 10.1 mg/dL 9.4   Anion Gap Latest Ref Range: 3 - 14 mmol/L 6   Magnesium Latest Ref Range: 1.6 - 2.3 mg/dL 1.7   Phosphorus Latest Ref Range: 2.5 - 4.5 mg/dL 3.0   Albumin Latest Ref Range: 3.4 - 5.0 g/dL 4.1   Protein Total Latest Ref Range: 6.8 - 8.8 g/dL 7.3   Bilirubin Total Latest Ref Range: 0.2 - 1.3 mg/dL 0.3   Alkaline Phosphatase Latest Ref  Range: 40 - 150 U/L 107   ALT Latest Ref Range: 0 - 70 U/L 20   AST Latest Ref Range: 0 - 45 U/L 13   Alpha Fetoprotein Latest Ref Range: 0 - 8 ug/L <1.5   Bilirubin Direct Latest Ref Range: 0.0 - 0.2 mg/dL <0.1   Ferritin Latest Ref Range: 26 - 388 ng/mL 344   Iron Latest Ref Range: 35 - 180 ug/dL 72   Iron Binding Cap Latest Ref Range: 240 - 430 ug/dL 192 (L)   Iron Saturation Index Latest Ref Range: 15 - 46 % 38   Glucose Latest Ref Range: 70 - 99 mg/dL 187 (H)   WBC Latest Ref Range: 4.0 - 11.0 10e9/L 4.3   Hemoglobin Latest Ref Range: 13.3 - 17.7 g/dL 11.1 (L)   Hematocrit Latest Ref Range: 40.0 - 53.0 % 32.7 (L)   Platelet Count Latest Ref Range: 150 - 450 10e9/L 123 (L)   RBC Count Latest Ref Range: 4.4 - 5.9 10e12/L 3.18 (L)   MCV Latest Ref Range: 78 - 100 fl 103 (H)   MCH Latest Ref Range: 26.5 - 33.0 pg 34.9 (H)   MCHC Latest Ref Range: 31.5 - 36.5 g/dL 33.9   RDW Latest Ref Range: 10.0 - 15.0 % 14.2   Diff Method Unknown Automated Method   % Neutrophils Latest Units: % 77.4   % Lymphocytes Latest Units: % 11.4   % Monocytes Latest Units: % 6.5   % Eosinophils Latest Units: % 3.3   % Basophils Latest Units: % 0.7   % Immature Granulocytes Latest Units: % 0.7   Absolute Neutrophil Latest Ref Range: 1.6 - 8.3 10e9/L 3.3   Absolute Lymphocytes Latest Ref Range: 0.8 - 5.3 10e9/L 0.5 (L)   Absolute Monocytes Latest Ref Range: 0.0 - 1.3 10e9/L 0.3   Absolute Eosinophils Latest Ref Range: 0.0 - 0.7 10e9/L 0.1   Absolute Basophils Latest Ref Range: 0.0 - 0.2 10e9/L 0.0   Abs Immature Granulocytes Latest Ref Range: 0 - 0.4 10e9/L 0.0       Imaging: EXAMINATION: CT CHEST W/O CONTRAST  5/22/2020 9:49 AM       CLINICAL HISTORY: Assess for recurrence of hepatocellular carcinoma.     COMPARISON: CT 1/4/2019.     TECHNIQUE: CT imaging obtained through the chest without intravenous  contrast. Coronal and axial MIP reformatted images obtained.     FINDINGS:  Small right-sided pleural effusion that is simple  attenuating.  Associated small amounts of right basilar atelectasis. 3 mm subpleural  nodule within the right middle lobe (series 4, image 165), this age be  a focal area of atelectasis given the associated pleural effusion. No  other new or enlarging pulmonary nodules.     Heart size is not enlarged. No pericardial effusion. Moderate to heavy  coronary artery calcifications. Slight distal esophageal wall  thickening. Configuration of the great vessels is unremarkable. Mild  calcific atherosclerotic disease.     Bones and soft tissues: No suspicious bone findings. Mild degenerative  changes of the spine.     Partially imaged upper abdomen: Limited. Numerous clips projecting  near the jeff hepatis where there may be some periportal edema..  Scattered dense surrounding the IVC, likely iatrogenic. Spleen is  enlarged. Transplant liver. Prominent splenic and perigastric varices.                                                                      IMPRESSION: In this patient with a history of hepatocellular  carcinoma:  1. Small right-sided pleural effusion consisting of simple attenuating  pleural fluid.  2. No evidence of metastatic disease within the chest.  3. Incidental findings as described.     I have personally reviewed the examination and initial interpretation  and I agree with the findings.     RODERICK HENDRESON MD    MRI ABDOMEN     CLINICAL HISTORY:  HCC. Post liver transplant.     TECHNIQUE:  Images were acquired with and without intravenous contrast  through the abdomen. The following MR images were acquired: TrueFISP,  multiplanar T2 weighted, axial T1 in/out of phase, axial fat-saturated  T1, diffusion-weighted. Multiplanar T1-weighted images with fat  saturation were before contrast administration and at multiple time  points following the administration of intravenous contrast. Contrast  dose: 7.0mL Gadavist     FINDINGS:     Comparison study: MRI abdomen 1/24/2020. CT chest 5/22/2020.     Liver: Patient  is status post liver transplant. Surface contours are  smooth. No significant hepatic steatosis or iron deposition. A T1  hyperintense collection posterior to segment 7 has decreased in size  from prior MRI consistent with resolving hematoma. This now measures  up to 2.8 cm, series 13 image 26 compared to 4.8 cm previously. Fluid  collection along the falciform ligament and inferior right hepatic  lobe have also improved.     Subcapsular area of arterial enhancement laterally in the inferior  right hepatic lobe, series 15 image 69 demonstrated slightly more  prominent than prior study likely due to difference in phase of  contrast enhancement. No correlate on other sequences with no area of  washout or restricted diffusion. No suspicious liver lesions.     Portal vein and hepatic vein/IVC confluence patent.     Gallbladder: Surgically resected. No biliary dilatation.     Spleen: Enlarged at 14.3 cm craniocaudad, stable.     Kidneys: No hydronephrosis. Tiny hemorrhagic/proteinaceous cyst in the  upper pole of the right kidney present, series 13 image 52. A simple  cyst medially in the mid left kidney measures 14 mm. A few additional  tiny renal cysts also present. Mild perinephric stranding.     Adrenal glands: Within normal limits.     Pancreas: Slightly atrophic. No ductal dilatation. A tiny T2  hyperintensity in the superior aspect of the pancreatic body noted,  series 4 image 30, stable from prior study.     Bowel: No dilated loops of bowel.     Lymph nodes: No enlarged lymph nodes.     Blood vessels: Midline vessels are normal in caliber with post  surgical changes from liver transplant. Sequelae of prior portal  venous hypertension with portosystemic collaterals in the upper  abdomen and paraesophageal varices stable to slightly decreased from  prior study. Nonocclusive thrombus in the SMV unchanged from prior  study. Areas of portal vein thrombus towards the portal splenic  confluence not as apparent on the  current study. A replaced common  hepatic artery arising from the SMA redemonstrated.     Lung bases: Small right pleural effusion demonstrated with adjacent  atelectasis. Please see separate CT chest same date.     Bones and soft tissues: Within normal limits. Small fat-containing  umbilical hernia.     Mesentery and abdominal wall: Post surgical changes in the anterior  abdominal wall.     Ascites: None                                                                      IMPRESSION:   1.  Postsurgical changes from liver transplant.  2.  Perihepatic fluid collections have decreased from prior study  including a small perihepatic hematoma along the posterior superior  right hepatic lobe.  3.  Area of shunting/perfusion related changes in the posterior  inferior right hepatic lobe. No suspicious liver lesions.  4.  Stable nonocclusive thrombus in the SMV. Portal vein appears  largely patent.  5.  Small right pleural effusion.     JUAN DAVID LOCKHART MD    Impression/plan:   HCC, s/p liver transplant on 11/11/19  -reviewed his imaging and labs with him, there is no evidence of recurrence. He questions why he needed to come today. Reviewed the rationale for surveillance of recurrence as he remains at moderate risk of recurrence. He was in agreement  -will schedule CT chest, MRI liver and labs in 4 months and have a visit with Dr. Villarreal after that time.  -he has f/u with the transplant team this afternoon        SHANIQUE Liz CNP

## 2020-05-26 NOTE — PROGRESS NOTES
Per Dr. Caitlin marc , patient to have 7 day Zio monitor placed.  Diagnosis: palpitations  Monitor placed: Yes  Patient Instructed: Yes  Patient verbalized understanding: Yes  Holter # [id number]  Documented by:Dorothy Sellers  Placed By:Dorothy Sellers

## 2020-05-26 NOTE — TELEPHONE ENCOUNTER
Called pt to set up virtual visit. Pt states he can not set up at this time, I will give him a call back in a half-hour.  Magda Sifuentes CMA  5/26/2020 10:28 AM

## 2020-05-26 NOTE — NURSING NOTE
"Oncology Rooming Note    May 26, 2020 10:26 AM   Frandy Workman is a 55 year old male who presents for:    Chief Complaint   Patient presents with     Oncology Clinic Visit     Pt is here for a rtn for CKD     Initial Vitals: Blood Pressure (Abnormal) 140/78   Pulse 100   Respiration 16   Weight 71.1 kg (156 lb 12.8 oz)   Oxygen Saturation 100%   Body Mass Index 23.16 kg/m   Estimated body mass index is 23.16 kg/m  as calculated from the following:    Height as of 3/4/20: 1.753 m (5' 9\").    Weight as of this encounter: 71.1 kg (156 lb 12.8 oz). Body surface area is 1.86 meters squared.  Data Unavailable Comment: Data Unavailable   No LMP for male patient.  Allergies reviewed: Yes  Medications reviewed: Yes    Medications: Medication refills not needed today.  Pharmacy name entered into [employer]: [facility] - [place], [place] - [address]    Clinical concerns: none       Helen Jacome MA            "

## 2020-05-26 NOTE — PROGRESS NOTES
Reason for Visit: seen in f/u of hepatocellular carcinoma    Oncology HPI:   Miller Workman is a 55 year old man with a PMH of DMII, cirrhosis s/t ESTRADA, HLD, HTN, GERD, BPH. He as diagnosed with hepatocellular carcinoma in December 2018 when he was found to have 2 LIRADS 5 lesions on surveillance imaging. He underwent TACE on 1/22/19.  He is liver transplant on 11/11/2019. The explanted liver had 2 lesions that were mostly necrotic and less than 3 cm with no clearly identifiable vascular invasion.   He is here for routine surveillance today.    Interval history: Miller is feeling well.Feels his stamina and strength are not back to baseline, but improving. Appetite is good. Energy is good. No fevers/chills, cough, sob, cp, palpitation. Is not isolating or wearing a mask, feels the concerns over the pandemic are overblown. No bloating, abdominal pain. Bowel/bladder function are stable. Has some chronic ankle edema, no calf/thigh pain, but feels a little tenderness around the ankles.     Current Outpatient Medications   Medication Sig Dispense Refill     Acetaminophen (TYLENOL) 325 MG CAPS Take 325-650 mg by mouth every 4 hours as needed (pain, fever) 100 capsule 3     alpha-lipoic acid 600 MG capsule Take 1 capsule (600 mg) by mouth daily 90 capsule 3     Artificial Tear Solution (SM ARTIFICIAL TEARS) SOLN Place 1 drop into the right eye every hour as needed (dry eyes) Apply at least 4 times daily and as needed for dry eye 1 Bottle 3     aspirin 81 MG EC tablet Take 1 tablet (81 mg) by mouth daily       BD VIKTORIA U/F 32G X 4 MM insulin pen needle Use 5 per day 300 each 3     blood glucose (ACCU-CHEK EDINSON PLUS) test strip USE TO TEST FOUR TIMES DAILY OR AS DIRECTED 400 strip 6     blood glucose monitoring (ACCU-CHEK FASTCLIX) lancets Use to test blood sugar 4 times daily or as directed.  1 box = 102 lancets 408 each 3     Continuous Blood Gluc  (FREESTYLE CLAUDIA 14 DAY READER) CECILIO Use to read blood sugars as per  's instructions. 1 each 0     Continuous Blood Gluc Sensor (FREESTYLE CLAUDIA 14 DAY SENSOR) MISC Change every 14 days. 2 each 11     econazole nitrate 1 % external cream Apply topically daily To feet and toenails. 85 g 0     ferrous sulfate (FEROSUL) 325 (65 Fe) MG tablet Take 1 tablet (325 mg) by mouth daily (with breakfast) 90 tablet 3     Glucagon 3 MG/DOSE POWD Spray 1 Dose in nostril as needed (for low blood sugar with sedation or emesis (unable to ingest glucose)) 1 each 1     glucose (BD GLUCOSE) 4 g chewable tablet Take 1 tablet by mouth every hour as needed for low blood sugar 60 tablet 1     insulin aspart (NOVOLOG PEN) 100 UNIT/ML pen Inject 1 unit : 8 grams carb + sliding scale 4 times daily Max units per day   80 units daily . 75 mL 3     insulin degludec (TRESIBA FLEXTOUCH) 100 UNIT/ML pen Inject 20 Units Subcutaneous daily 15 mL 0     insulin glargine (BASAGLAR KWIKPEN) 100 UNIT/ML pen Inject 28 Units Subcutaneous daily 30 mL 3     lamiVUDine (EPIVIR) 100 MG tablet Take 1 tablet (100 mg) by mouth daily 30 tablet 3     magnesium oxide (MAG-OX) 400 MG tablet Take 1 tablet (400 mg) by mouth every evening 90 tablet 3     Multiple Vitamin (THERAVITE PO) Take 1 tablet by mouth every morning        OLANZapine (ZYPREXA) 5 MG tablet Take 1 tablet (5 mg) by mouth At Bedtime 30 tablet 4     omeprazole (PRILOSEC) 40 MG DR capsule Take 1 capsule (40 mg) by mouth daily 90 capsule 2     rosuvastatin (CRESTOR) 5 MG tablet Take 1 tablet (5 mg) by mouth daily 30 tablet 3     tacrolimus (GENERIC EQUIVALENT) 1 MG capsule Take 4 capsules (4 mg) by mouth every 12 hours 240 capsule 11     tamsulosin (FLOMAX) 0.4 MG capsule TAKE 1 CAPSULE(0.4 MG) BY MOUTH DAILY 90 capsule 3     vitamin B-12 (CYANOCOBALAMIN) 1000 MCG tablet Take 1 tablet (1,000 mcg) by mouth daily 30 tablet 11     vitamin D3 (CHOLECALCIFEROL) 2000 units (50 mcg) tablet Take 1 tablet (2,000 Units) by mouth daily 90 tablet 3          Allergies    Allergen Reactions     Codeine Other (See Comments)     Cannot take due to liver  Cannot tolerate oral narcotics     Seasonal Allergies      Sneezing, coughing, runny and itchy eyes         Exam: alert, appears in NAD.  Blood pressure (!) 140/78, pulse 100, resp. rate 16, weight 71.1 kg (156 lb 12.8 oz), SpO2 100 %.  Wt Readings from Last 4 Encounters:   05/26/20 71.1 kg (156 lb 12.8 oz)   05/21/20 71.2 kg (156 lb 14.4 oz)   05/08/20 72.8 kg (160 lb 8 oz)   04/22/20 70 kg (154 lb 4.8 oz)     Oropharynx is moist and without lesion. Neck supple and without adenopathy. Lungs:CTA. Heart: RRR, no murmur or rub. Abdomen: soft, nontender, BS active, no masses or organomegaly.  Extremities: warm, 1+ BLE ankle edema. Speech is clear. CN wnl. Gait/station wnl. Skin: no rash on exposed skin       Labs: Results for MAGDY SANDERS (MRN [id number]) as of 5/26/2020 07:39   Ref. Range 5/21/2020 07:23   Sodium Latest Ref Range: 133 - 144 mmol/L 142   Potassium Latest Ref Range: 3.4 - 5.3 mmol/L 4.1   Chloride Latest Ref Range: 94 - 109 mmol/L 108   Carbon Dioxide Latest Ref Range: 20 - 32 mmol/L 28   Urea Nitrogen Latest Ref Range: 7 - 30 mg/dL 40 (H)   Creatinine Latest Ref Range: 0.66 - 1.25 mg/dL 1.16   GFR Estimate Latest Ref Range: >60 mL/min/1.73_m2 70   GFR Estimate If Black Latest Ref Range: >60 mL/min/1.73_m2 81   Calcium Latest Ref Range: 8.5 - 10.1 mg/dL 9.4   Anion Gap Latest Ref Range: 3 - 14 mmol/L 6   Magnesium Latest Ref Range: 1.6 - 2.3 mg/dL 1.7   Phosphorus Latest Ref Range: 2.5 - 4.5 mg/dL 3.0   Albumin Latest Ref Range: 3.4 - 5.0 g/dL 4.1   Protein Total Latest Ref Range: 6.8 - 8.8 g/dL 7.3   Bilirubin Total Latest Ref Range: 0.2 - 1.3 mg/dL 0.3   Alkaline Phosphatase Latest Ref Range: 40 - 150 U/L 107   ALT Latest Ref Range: 0 - 70 U/L 20   AST Latest Ref Range: 0 - 45 U/L 13   Alpha Fetoprotein Latest Ref Range: 0 - 8 ug/L <1.5   Bilirubin Direct Latest Ref Range: 0.0 - 0.2 mg/dL <0.1   Ferritin Latest Ref  Range: 26 - 388 ng/mL 344   Iron Latest Ref Range: 35 - 180 ug/dL 72   Iron Binding Cap Latest Ref Range: 240 - 430 ug/dL 192 (L)   Iron Saturation Index Latest Ref Range: 15 - 46 % 38   Glucose Latest Ref Range: 70 - 99 mg/dL 187 (H)   WBC Latest Ref Range: 4.0 - 11.0 10e9/L 4.3   Hemoglobin Latest Ref Range: 13.3 - 17.7 g/dL 11.1 (L)   Hematocrit Latest Ref Range: 40.0 - 53.0 % 32.7 (L)   Platelet Count Latest Ref Range: 150 - 450 10e9/L 123 (L)   RBC Count Latest Ref Range: 4.4 - 5.9 10e12/L 3.18 (L)   MCV Latest Ref Range: 78 - 100 fl 103 (H)   MCH Latest Ref Range: 26.5 - 33.0 pg 34.9 (H)   MCHC Latest Ref Range: 31.5 - 36.5 g/dL 33.9   RDW Latest Ref Range: 10.0 - 15.0 % 14.2   Diff Method Unknown Automated Method   % Neutrophils Latest Units: % 77.4   % Lymphocytes Latest Units: % 11.4   % Monocytes Latest Units: % 6.5   % Eosinophils Latest Units: % 3.3   % Basophils Latest Units: % 0.7   % Immature Granulocytes Latest Units: % 0.7   Absolute Neutrophil Latest Ref Range: 1.6 - 8.3 10e9/L 3.3   Absolute Lymphocytes Latest Ref Range: 0.8 - 5.3 10e9/L 0.5 (L)   Absolute Monocytes Latest Ref Range: 0.0 - 1.3 10e9/L 0.3   Absolute Eosinophils Latest Ref Range: 0.0 - 0.7 10e9/L 0.1   Absolute Basophils Latest Ref Range: 0.0 - 0.2 10e9/L 0.0   Abs Immature Granulocytes Latest Ref Range: 0 - 0.4 10e9/L 0.0       Imaging: EXAMINATION: CT CHEST W/O CONTRAST  5/22/2020 9:49 AM       CLINICAL HISTORY: Assess for recurrence of hepatocellular carcinoma.     COMPARISON: CT 1/4/2019.     TECHNIQUE: CT imaging obtained through the chest without intravenous  contrast. Coronal and axial MIP reformatted images obtained.     FINDINGS:  Small right-sided pleural effusion that is simple attenuating.  Associated small amounts of right basilar atelectasis. 3 mm subpleural  nodule within the right middle lobe (series 4, image 165), this age be  a focal area of atelectasis given the associated pleural effusion. No  other new or  enlarging pulmonary nodules.     Heart size is not enlarged. No pericardial effusion. Moderate to heavy  coronary artery calcifications. Slight distal esophageal wall  thickening. Configuration of the great vessels is unremarkable. Mild  calcific atherosclerotic disease.     Bones and soft tissues: No suspicious bone findings. Mild degenerative  changes of the spine.     Partially imaged upper abdomen: Limited. Numerous clips projecting  near the jeff hepatis where there may be some periportal edema..  Scattered dense surrounding the IVC, likely iatrogenic. Spleen is  enlarged. Transplant liver. Prominent splenic and perigastric varices.                                                                      IMPRESSION: In this patient with a history of hepatocellular  carcinoma:  1. Small right-sided pleural effusion consisting of simple attenuating  pleural fluid.  2. No evidence of metastatic disease within the chest.  3. Incidental findings as described.     I have personally reviewed the examination and initial interpretation  and I agree with the findings.     RODERICK HENDERSON MD    MRI ABDOMEN     CLINICAL HISTORY:  HCC. Post liver transplant.     TECHNIQUE:  Images were acquired with and without intravenous contrast  through the abdomen. The following MR images were acquired: TrueFISP,  multiplanar T2 weighted, axial T1 in/out of phase, axial fat-saturated  T1, diffusion-weighted. Multiplanar T1-weighted images with fat  saturation were before contrast administration and at multiple time  points following the administration of intravenous contrast. Contrast  dose: 7.0mL Gadavist     FINDINGS:     Comparison study: MRI abdomen 1/24/2020. CT chest 5/22/2020.     Liver: Patient is status post liver transplant. Surface contours are  smooth. No significant hepatic steatosis or iron deposition. A T1  hyperintense collection posterior to segment 7 has decreased in size  from prior MRI consistent with resolving  hematoma. This now measures  up to 2.8 cm, series 13 image 26 compared to 4.8 cm previously. Fluid  collection along the falciform ligament and inferior right hepatic  lobe have also improved.     Subcapsular area of arterial enhancement laterally in the inferior  right hepatic lobe, series 15 image 69 demonstrated slightly more  prominent than prior study likely due to difference in phase of  contrast enhancement. No correlate on other sequences with no area of  washout or restricted diffusion. No suspicious liver lesions.     Portal vein and hepatic vein/IVC confluence patent.     Gallbladder: Surgically resected. No biliary dilatation.     Spleen: Enlarged at 14.3 cm craniocaudad, stable.     Kidneys: No hydronephrosis. Tiny hemorrhagic/proteinaceous cyst in the  upper pole of the right kidney present, series 13 image 52. A simple  cyst medially in the mid left kidney measures 14 mm. A few additional  tiny renal cysts also present. Mild perinephric stranding.     Adrenal glands: Within normal limits.     Pancreas: Slightly atrophic. No ductal dilatation. A tiny T2  hyperintensity in the superior aspect of the pancreatic body noted,  series 4 image 30, stable from prior study.     Bowel: No dilated loops of bowel.     Lymph nodes: No enlarged lymph nodes.     Blood vessels: Midline vessels are normal in caliber with post  surgical changes from liver transplant. Sequelae of prior portal  venous hypertension with portosystemic collaterals in the upper  abdomen and paraesophageal varices stable to slightly decreased from  prior study. Nonocclusive thrombus in the SMV unchanged from prior  study. Areas of portal vein thrombus towards the portal splenic  confluence not as apparent on the current study. A replaced common  hepatic artery arising from the SMA redemonstrated.     Lung bases: Small right pleural effusion demonstrated with adjacent  atelectasis. Please see separate CT chest same date.     Bones and soft  tissues: Within normal limits. Small fat-containing  umbilical hernia.     Mesentery and abdominal wall: Post surgical changes in the anterior  abdominal wall.     Ascites: None                                                                      IMPRESSION:   1.  Postsurgical changes from liver transplant.  2.  Perihepatic fluid collections have decreased from prior study  including a small perihepatic hematoma along the posterior superior  right hepatic lobe.  3.  Area of shunting/perfusion related changes in the posterior  inferior right hepatic lobe. No suspicious liver lesions.  4.  Stable nonocclusive thrombus in the SMV. Portal vein appears  largely patent.  5.  Small right pleural effusion.     JUAN DAVID LOCKHART MD    Impression/plan:   HCC, s/p liver transplant on 11/11/19  -reviewed his imaging and labs with him, there is no evidence of recurrence. He questions why he needed to come today. Reviewed the rationale for surveillance of recurrence as he remains at moderate risk of recurrence. He was in agreement  -will schedule CT chest, MRI liver and labs in 4 months and have a visit with Dr. Villarreal after that time.  -he has f/u with the transplant team this afternoon

## 2020-05-26 NOTE — PROGRESS NOTES
"Frandy Workman is a 55 year old male who is being evaluated via a billable telephone visit.      The patient has been notified of following:     \"This telephone visit will be conducted via a call between you and your physician/provider. We have found that certain health care needs can be provided without the need for a physical exam.  This service lets us provide the care you need with a short phone conversation.  If a prescription is necessary we can send it directly to your pharmacy.  If lab work is needed we can place an order for that and you can then stop by our lab to have the test done at a later time.    Telephone visits are billed at different rates depending on your insurance coverage. During this emergency period, for some insurers they may be billed the same as an in-person visit.  Please reach out to your insurance provider with any questions.    If during the course of the call the physician/provider feels a telephone visit is not appropriate, you will not be charged for this service.\"    Patient has given verbal consent for Telephone visit?  Yes    What phone number would you like to be contacted at? [phone]    How would you like to obtain your AVS? Melaniehart    Phone call duration: 21 minutes  ______________________________________________    Telephone visit for liver transplant.    OLT 11/11/19  - ESTRADA/HCC  - mikaela-op MELD= 12  - donor- donor age 63/local/DBD/ECD- hep B core positive/donor CMV(+)/recipient CMV(-)  - operation- CiT 7:39, EBL 2L, piggyback IVC, duct-to-duct biliary anastomosis  - post-op course- perihepatic hematoma; MMF colitis Dec 2019  - immunosuppression- FK     HCC  - dx Dec 2018  - MRI liver 12/23/18- 3.1cm lesion segment IVB, 1.1cm lesion segment III  - AFP 12/28/18= 5.2  - bone scan 1/4/19  - CT chest 1/4/19  - TACE 1/22/19 (seg IV); microwave ablation 1/23/19 (seg III)  - explant pathology- no viable tumor; moderately differentiated; no vascular invasion  - last MRI " "liver May 2020  - last AFP 5/21/20<1.5    Last clinic visit Feb 2020.  Patient was in nursing home earlier this month related to issues with his daughter.  He was also in his local ER with mental health issues after [facility] officials were concerned with his well-being.  He was started on Zyprexa as a mood stabilizer as he was initially unwilling to see a mental health provider.  He recently saw Jsoeph Murillo from behavioral health who has referred the patient for neuropsychological testing.    Patient reports that he is \"great.\"  He continues to disagree that he may have bipolar disorder.  He remains tangential and contradictory in his speech- he thinks that COVID-19 is a biological weapon devised by [place] to destroy [place] and the [place].  He has not been taking Zyprexa regularly as it makes him sleep for 9 hours.    Patient reports some abdominal discomfort in his upper abdomen.  He also reports lower extremity edema of unclear duration.  He denies jaundice.    Patient reports occasional headache.  He reports slight mild tremor and denies any confusion.    No fevers, sweats or chills.  No cough or dyspnea.    Weight stable.  Appetite is good.    Patient denies any alcohol use.  He is moving to an apartment in [facility] tomorrow.    Medications reviewed.    Labs reviewed.    Imaging reviewed- MRI liver May 2020    Assessment  55 year old male with liver transplant for ESTRADA cirrhosis complicated with ascites, HE and HCC.  Liver function tests normal on current immunosuppression.  Hemoglobin improved with tapering off azathioprine.  Renal function stable.  No evidence of recurrent HCC on recent imaging.  Mental health is main concern at this time- will await results from neuropsychological testing in addition to psychiatry input.    Plan  1.  Liver transplant  - continue FK, target trough 7-10    2.  ?Bipolar disorder  - neuropsychology testing  - psychiatry eval    3.  Hx HCC  - repeat MRI liver in 6 " months    Follow-up in 3 months    Santi Banuelos MD  Hepatology  [facility]

## 2020-05-26 NOTE — LETTER
"    5/26/2020         RE: Frandy Workman  [address] [place] [id number]        Dear Colleague,    Thank you for referring your patient, Frandy Workman, to the [facility] HEPATOLOG[facility]. Please see a copy of my visit note below.    Frandy Workman is a 55 year old male who is being evaluated via a billable telephone visit.      The patient has been notified of following:     \"This telephone visit will be conducted via a call between you and your physician/provider. We have found that certain health care needs can be provided without the need for a physical exam.  This service lets us provide the care you need with a short phone conversation.  If a prescription is necessary we can send it directly to your pharmacy.  If lab work is needed we can place an order for that and you can then stop by our lab to have the test done at a later time.    Telephone visits are billed at different rates depending on your insurance coverage. During this emergency period, for some insurers they may be billed the same as an in-person visit.  Please reach out to your insurance provider with any questions.    If during the course of the call the physician/provider feels a telephone visit is not appropriate, you will not be charged for this service.\"    Patient has given verbal consent for Telephone visit?  Yes    What phone number would you like to be contacted at? [phone]    How would you like to obtain your AVS? Jordan    Phone call duration: 21 minutes  ______________________________________________    Telephone visit for liver transplant.    OLT 11/11/19  - ESTRADA/HCC  - mikaela-op MELD= 12  - donor- donor age 63/local/DBD/ECD- hep B core positive/donor CMV(+)/recipient CMV(-)  - operation- CiT 7:39, EBL 2L, piggyback IVC, duct-to-duct biliary anastomosis  - post-op course- perihepatic hematoma; MMF colitis Dec 2019  - immunosuppression- FK     HCC  - dx Dec 2018  - MRI liver 12/23/18- 3.1cm lesion segment IVB, 1.1cm lesion " "segment III  - AFP 12/28/18= 5.2  - bone scan 1/4/19  - CT chest 1/4/19  - TACE 1/22/19 (seg IV); microwave ablation 1/23/19 (seg III)  - explant pathology- no viable tumor; moderately differentiated; no vascular invasion  - last MRI liver May 2020  - last AFP 5/21/20<1.5    Last clinic visit Feb 2020.  Patient was in senior care earlier this month related to issues with his daughter.  He was also in his local ER with mental health issues after [facility] officials were concerned with his well-being.  He was started on Zyprexa as a mood stabilizer as he was initially unwilling to see a mental health provider.  He recently saw Joseph Murillo from behavioral health who has referred the patient for neuropsychological testing.    Patient reports that he is \"great.\"  He continues to disagree that he may have bipolar disorder.  He remains tangential and contradictory in his speech- he thinks that COVID-19 is a biological weapon devised by [place] to destroy [place] and the [place].  He has not been taking Zyprexa regularly as it makes him sleep for 9 hours.    Patient reports some abdominal discomfort in his upper abdomen.  He also reports lower extremity edema of unclear duration.  He denies jaundice.    Patient reports occasional headache.  He reports slight mild tremor and denies any confusion.    No fevers, sweats or chills.  No cough or dyspnea.    Weight stable.  Appetite is good.    Patient denies any alcohol use.  He is moving to an apartment in [place] tomorrow.    Medications reviewed.    Labs reviewed.    Imaging reviewed- MRI liver May 2020    Assessment  55 year old male with liver transplant for ESTRADA cirrhosis complicated with ascites, HE and HCC.  Liver function tests normal on current immunosuppression.  Hemoglobin improved with tapering off azathioprine.  Renal function stable.  No evidence of recurrent HCC on recent imaging.  Mental health is main concern at this time- will await results from " neuropsychological testing in addition to psychiatry input.    Plan  1.  Liver transplant  - continue FK, target trough 7-10    2.  ?Bipolar disorder  - neuropsychology testing  - psychiatry eval    3.  Hx HCC  - repeat MRI liver in 6 months    Follow-up in 3 months    Santi Banuelos MD  Hepatology  [facility]

## 2020-05-28 ENCOUNTER — TELEPHONE (OUTPATIENT)
Dept: TRANSPLANT | Facility: CLINIC | Age: 55
End: 2020-05-28

## 2020-05-28 NOTE — TELEPHONE ENCOUNTER
Transplant Social Work Services Phone Call      Data/Intervention: Medication adherance remains a concern.  I contacted Miller and recommended he be referred for a medication management machine.  I contacted [facility] and they will forward my request to [facility].  Assessment: Miller acknowledges he could use assistance with managing his medications.  He is agreeable to having a medication machine, and understands this would be private pay.   Plan: Awaiting call back from [facility].        ALEXYS Mncair, [facility]  Liver Transplant   Phone [phone]  Pager [phone]

## 2020-06-02 ENCOUNTER — VIRTUAL VISIT (OUTPATIENT)
Dept: INTERNAL MEDICINE | Facility: CLINIC | Age: 55
End: 2020-06-02
Payer: MEDICARE

## 2020-06-02 ENCOUNTER — MYC MEDICAL ADVICE (OUTPATIENT)
Dept: INTERNAL MEDICINE | Facility: CLINIC | Age: 55
End: 2020-06-02

## 2020-06-02 DIAGNOSIS — E11.3293 TYPE 2 DIABETES MELLITUS WITH MILD NONPROLIFERATIVE RETINOPATHY OF BOTH EYES WITHOUT MACULAR EDEMA, UNSPECIFIED WHETHER LONG TERM INSULIN USE (H): ICD-10-CM

## 2020-06-02 DIAGNOSIS — F39 MOOD DISORDER (H): Primary | ICD-10-CM

## 2020-06-02 DIAGNOSIS — R21 RASH AND NONSPECIFIC SKIN ERUPTION: ICD-10-CM

## 2020-06-02 DIAGNOSIS — Z94.4 LIVER TRANSPLANT RECIPIENT (H): ICD-10-CM
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
## 2020-06-02 NOTE — TELEPHONE ENCOUNTER
Addendum on 6/2/20: Completed referral to [employer] ([facility], [phone]) for a medication management machine.  [facility] will reach out to patient to schedule delivery and installation. Cost is $65 installation and $60/monthly fee.        ALEXYS Mcnair, [facility]  Liver Transplant   Phone [phone]  Pager [phone]

## 2020-06-02 NOTE — NURSING NOTE
Chief Complaint   Patient presents with     Diabetes     Pt is following up on diabetes.      Barb Bryant LPN at 9:53 AM on 6/2/2020.

## 2020-06-03 ENCOUNTER — TELEPHONE (OUTPATIENT)
Dept: PHARMACY | Facility: CLINIC | Age: 55
End: 2020-06-03

## 2020-06-03 ENCOUNTER — INFUSION THERAPY VISIT (OUTPATIENT)
Dept: INFUSION THERAPY | Facility: CLINIC | Age: 55
End: 2020-06-03
Payer: MEDICARE

## 2020-06-03 ENCOUNTER — DOCUMENTATION ONLY (OUTPATIENT)
Dept: TRANSPLANT | Facility: CLINIC | Age: 55
End: 2020-06-03

## 2020-06-03 VITALS
OXYGEN SATURATION: 100 % | BODY MASS INDEX: 21.87 KG/M2 | SYSTOLIC BLOOD PRESSURE: 122 MMHG | WEIGHT: 148.1 LBS | TEMPERATURE: 98.2 F | RESPIRATION RATE: 18 BRPM | DIASTOLIC BLOOD PRESSURE: 76 MMHG | HEART RATE: 90 BPM

## 2020-06-03 DIAGNOSIS — D63.1 ANEMIA OF CHRONIC RENAL FAILURE, STAGE 4 (SEVERE) (H): ICD-10-CM

## 2020-06-03 DIAGNOSIS — Z94.4 LIVER REPLACED BY TRANSPLANT (H): ICD-10-CM

## 2020-06-03 DIAGNOSIS — N18.4 CKD (CHRONIC KIDNEY DISEASE) STAGE 4, GFR 15-29 ML/MIN (H): Primary | ICD-10-CM

## 2020-06-03 DIAGNOSIS — N18.4 ANEMIA OF CHRONIC RENAL FAILURE, STAGE 4 (SEVERE) (H): ICD-10-CM

## 2020-06-03 DIAGNOSIS — Z13.220 LIPID SCREENING: ICD-10-CM

## 2020-06-03 LAB
ALBUMIN SERPL-MCNC: 4.2 G/DL (ref 3.4–5)
ALP SERPL-CCNC: 118 U/L (ref 40–150)
ALT SERPL W P-5'-P-CCNC: 26 U/L (ref 0–70)
ANION GAP SERPL CALCULATED.3IONS-SCNC: 4 MMOL/L (ref 3–14)
AST SERPL W P-5'-P-CCNC: 14 U/L (ref 0–45)
BILIRUB DIRECT SERPL-MCNC: 0.2 MG/DL (ref 0–0.2)
BILIRUB SERPL-MCNC: 0.8 MG/DL (ref 0.2–1.3)
BUN SERPL-MCNC: 50 MG/DL (ref 7–30)
CALCIUM SERPL-MCNC: 10 MG/DL (ref 8.5–10.1)
CHLORIDE SERPL-SCNC: 105 MMOL/L (ref 94–109)
CO2 SERPL-SCNC: 27 MMOL/L (ref 20–32)
CREAT SERPL-MCNC: 1.4 MG/DL (ref 0.66–1.25)
ERYTHROCYTE [DISTWIDTH] IN BLOOD BY AUTOMATED COUNT: 13.2 % (ref 10–15)
GFR SERPL CREATININE-BSD FRML MDRD: 56 ML/MIN/{1.73_M2}
GLUCOSE SERPL-MCNC: 241 MG/DL (ref 70–99)
HCT VFR BLD AUTO: 30.9 % (ref 40–53)
HGB BLD-MCNC: 10.7 G/DL (ref 13.3–17.7)
MAGNESIUM SERPL-MCNC: 2 MG/DL (ref 1.6–2.3)
MCH RBC QN AUTO: 34.6 PG (ref 26.5–33)
MCHC RBC AUTO-ENTMCNC: 34.6 G/DL (ref 31.5–36.5)
MCV RBC AUTO: 100 FL (ref 78–100)
PHOSPHATE SERPL-MCNC: 2.7 MG/DL (ref 2.5–4.5)
PLATELET # BLD AUTO: 107 10E9/L (ref 150–450)
POTASSIUM SERPL-SCNC: 4.7 MMOL/L (ref 3.4–5.3)
PROT SERPL-MCNC: 7.6 G/DL (ref 6.8–8.8)
RBC # BLD AUTO: 3.09 10E12/L (ref 4.4–5.9)
SODIUM SERPL-SCNC: 136 MMOL/L (ref 133–144)
TACROLIMUS BLD-MCNC: 5.8 UG/L (ref 5–15)
TME LAST DOSE: NORMAL H
WBC # BLD AUTO: 3 10E9/L (ref 4–11)

## 2020-06-03 PROCEDURE — 84100 ASSAY OF PHOSPHORUS: CPT | Performed by: SURGERY

## 2020-06-03 PROCEDURE — 36415 COLL VENOUS BLD VENIPUNCTURE: CPT | Performed by: SURGERY

## 2020-06-03 PROCEDURE — 85027 COMPLETE CBC AUTOMATED: CPT | Performed by: SURGERY

## 2020-06-03 PROCEDURE — 96372 THER/PROPH/DIAG INJ SC/IM: CPT | Performed by: NURSE PRACTITIONER

## 2020-06-03 PROCEDURE — 83735 ASSAY OF MAGNESIUM: CPT | Performed by: SURGERY

## 2020-06-03 PROCEDURE — 99207 ZZC NO CHARGE NURSE ONLY: CPT

## 2020-06-03 PROCEDURE — 80076 HEPATIC FUNCTION PANEL: CPT | Performed by: SURGERY

## 2020-06-03 PROCEDURE — 80048 BASIC METABOLIC PNL TOTAL CA: CPT | Performed by: SURGERY

## 2020-06-03 PROCEDURE — 80197 ASSAY OF TACROLIMUS: CPT | Performed by: SURGERY
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
## 2020-06-03 NOTE — PROGRESS NOTES
Infusion Nursing Note:  Frandy Workman presents today for Aranesp injection.    Patient seen by provider today: No   present during visit today: Not Applicable.    Note: Assessment performed by Ekaterina COTA RN prior to injection today. Patient denies issues/concerns follow in previous injection.    Intravenous Access:  No Intravenous access/labs at this visit.    Treatment Conditions:    If Hgb increased more than 1 point in 2 weeks call MD for dose adjustment. If Hgb is unavailable, hold dose. If Hgb <11.5 gm/dL, give dose. Hold dose if systolic blood pressure >180 mm Hg.     Lab Results   Component Value Date    HGB 10.7 06/03/2020     Lab Results   Component Value Date    WBC 3.0 06/03/2020      Lab Results   Component Value Date    ANEU 3.3 05/21/2020     Lab Results   Component Value Date     06/03/2020      Results reviewed, labs MET treatment parameters, ok to proceed with treatment.      Post Infusion Assessment:  Patient tolerated injection without incident.  Site patent and intact, free from redness, edema or discomfort.       Discharge Plan:   Patient discharged in stable condition accompanied by: self.  Departure Mode: Ambulatory.    Emilie Otero LPN
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
## 2020-06-03 NOTE — TELEPHONE ENCOUNTER
Anemia Management Note  SUBJECTIVE/OBJECTIVE:  Referred by Dr. Eloy Rao on 3/2/2020  Primary Diagnosis: Anemia in Chronic Kidney Disease (N18.4, D63.1)     Secondary Diagnosis:  Chronic Kidney Disease, Stage 4 (N18.4)                 Liver Transplant: 2019  Hgb goal range:  10-11.5 per Dr. Rao's referral  Epo/Darbo: Aranesp 40mcg every 14 days for Hgb <11.5.  In Clinic.  Iron regimen:  Ferrous Sulfate 325mg once daily - was taking three times daily, decreased in   Labs : Hemoglobin - has standing order for CBC/platelets twice weekly - expires 2020  Iron Labs: 3/3/2021  Recent IVELISSE use, transfusion, IV iron: PRBC on 3/3  RX/TX plans : TBD  No history of stroke, MI and blood clots.  History of hepatocellular carcinoma - s/p TACE 2019 and liver transplant 2019     Contact:            Ok to leave message regarding scheduling, medical, and billing per consent to communicate dated 10/2/19                              OK to speak with Davi Saunders (friend/POA) regarding scheduling, medical, and billing per consent to communicate dated 10/2/19    Anemia Latest Ref Rng & Units 2020 2020 2020 2020 2020 6/3/2020 2020   IVELISSE Dose - 40mcg 40mcg - 40mcg 40mcg 40mcg -   Hemoglobin 13.3 - 17.7 g/dL 9.1(L) 10.2(L) 10.2(A) - 11.1(L) 10.7(L) 9.2(L)   TSAT 15 - 46 % - 30 - - 38 - -   Ferritin 26 - 388 ng/mL - 643(H) - - 344 - -   PRBCs - - - - - - - -     BP Readings from Last 3 Encounters:   20 122/76   20 (!) 140/78   20 134/77     Wt Readings from Last 2 Encounters:   20 67.2 kg (148 lb 1.6 oz)   20 71.1 kg (156 lb 12.8 oz)           ASSESSMENT:  Hgb:At goal - recommend dose  TSat: at goal >30% Ferritin: At goal (>100ng/mL)    PLAN:  Dose with aranesp and RTC for hgb then aranesp if needed in 2 week(s)    Orders needed to be renewed (for next follow-up date) in EPIC: None    Iron labs due:  4 weeks    Plan discussed with:  No  call made. Next appt sched for 6/17/20  Plan provided by:  adri    NEXT FOLLOW-UP DATE:  6/17/20  8am appt    Jie Blum RN   [facility]  [address] [address]   [email or web address]   Office : [phone]  Fax: [phone]

## 2020-06-03 NOTE — PROGRESS NOTES
Today's /76. Hold dose if systolic blood pressure >180 mm Hg.  Today's Hgb 10.7. Give if Hgb <11.5.  Do NOT give if patient is receiving dialysis. Do NOT give if patient is receiving intravenous iron in the same day. Confirms he his not.   Patient had a visit with Dr. Tucker yesterday to discuss amber.   Ekaterina Balbuena RN

## 2020-06-05 ENCOUNTER — TELEPHONE (OUTPATIENT)
Dept: INTERNAL MEDICINE | Facility: CLINIC | Age: 55
End: 2020-06-05

## 2020-06-08 DIAGNOSIS — N18.30 CKD (CHRONIC KIDNEY DISEASE) STAGE 3, GFR 30-59 ML/MIN (H): Primary | ICD-10-CM

## 2020-06-11 ENCOUNTER — HOSPITAL ENCOUNTER (INPATIENT)
Facility: CLINIC | Age: 55
LOS: 14 days | Discharge: HOME HEALTH | DRG: 885 | End: 2020-06-26
Attending: EMERGENCY MEDICINE | Admitting: PSYCHIATRY & NEUROLOGY
Payer: MEDICARE

## 2020-06-11 ENCOUNTER — TELEPHONE (OUTPATIENT)
Dept: BEHAVIORAL HEALTH | Facility: CLINIC | Age: 55
End: 2020-06-11

## 2020-06-11 ENCOUNTER — TELEPHONE (OUTPATIENT)
Dept: TRANSPLANT | Facility: CLINIC | Age: 55
End: 2020-06-11

## 2020-06-11 DIAGNOSIS — Z94.4 LIVER TRANSPLANT RECIPIENT (H): ICD-10-CM

## 2020-06-11 DIAGNOSIS — F31.9 BIPOLAR I DISORDER (H): ICD-10-CM

## 2020-06-11 DIAGNOSIS — Z94.4 STATUS POST LIVER TRANSPLANTATION (H): ICD-10-CM

## 2020-06-11 DIAGNOSIS — D84.9 IMMUNOSUPPRESSED STATUS (H): ICD-10-CM

## 2020-06-11 DIAGNOSIS — E11.22 TYPE 2 DIABETES MELLITUS WITH STAGE 3 CHRONIC KIDNEY DISEASE, WITH LONG-TERM CURRENT USE OF INSULIN (H): ICD-10-CM

## 2020-06-11 DIAGNOSIS — E11.3293 TYPE 2 DIABETES MELLITUS WITH MILD NONPROLIFERATIVE RETINOPATHY OF BOTH EYES WITHOUT MACULAR EDEMA, UNSPECIFIED WHETHER LONG TERM INSULIN USE (H): ICD-10-CM

## 2020-06-11 DIAGNOSIS — Z20.822 2019-NCOV NOT DETECTED: ICD-10-CM

## 2020-06-11 DIAGNOSIS — F31.2 SEVERE MANIC BIPOLAR I DISORDER WITH PSYCHOTIC FEATURES (H): ICD-10-CM

## 2020-06-11 DIAGNOSIS — F30.9 MANIA (H): Primary | ICD-10-CM

## 2020-06-11 DIAGNOSIS — N18.30 TYPE 2 DIABETES MELLITUS WITH STAGE 3 CHRONIC KIDNEY DISEASE, WITH LONG-TERM CURRENT USE OF INSULIN (H): ICD-10-CM

## 2020-06-11 DIAGNOSIS — Z79.4 TYPE 2 DIABETES MELLITUS WITH STAGE 3 CHRONIC KIDNEY DISEASE, WITH LONG-TERM CURRENT USE OF INSULIN (H): ICD-10-CM

## 2020-06-11 LAB
ALBUMIN SERPL-MCNC: 3.9 G/DL (ref 3.4–5)
ALP SERPL-CCNC: 122 U/L (ref 40–150)
ALT SERPL W P-5'-P-CCNC: 26 U/L (ref 0–70)
ANION GAP SERPL CALCULATED.3IONS-SCNC: 5 MMOL/L (ref 3–14)
AST SERPL W P-5'-P-CCNC: 18 U/L (ref 0–45)
BASOPHILS # BLD AUTO: 0 10E9/L (ref 0–0.2)
BASOPHILS NFR BLD AUTO: 0 %
BILIRUB SERPL-MCNC: 0.9 MG/DL (ref 0.2–1.3)
BUN SERPL-MCNC: 36 MG/DL (ref 7–30)
CALCIUM SERPL-MCNC: 9.2 MG/DL (ref 8.5–10.1)
CHLORIDE SERPL-SCNC: 103 MMOL/L (ref 94–109)
CO2 SERPL-SCNC: 28 MMOL/L (ref 20–32)
CREAT SERPL-MCNC: 1.71 MG/DL (ref 0.66–1.25)
DIFFERENTIAL METHOD BLD: ABNORMAL
EOSINOPHIL # BLD AUTO: 0 10E9/L (ref 0–0.7)
EOSINOPHIL NFR BLD AUTO: 0.9 %
ERYTHROCYTE [DISTWIDTH] IN BLOOD BY AUTOMATED COUNT: 14.4 % (ref 10–15)
GFR SERPL CREATININE-BSD FRML MDRD: 44 ML/MIN/{1.73_M2}
GLUCOSE BLDC GLUCOMTR-MCNC: 350 MG/DL (ref 70–99)
GLUCOSE SERPL-MCNC: 305 MG/DL (ref 70–99)
HCT VFR BLD AUTO: 27.8 % (ref 40–53)
HGB BLD-MCNC: 9.2 G/DL (ref 13.3–17.7)
LYMPHOCYTES # BLD AUTO: 2 10E9/L (ref 0.8–5.3)
LYMPHOCYTES NFR BLD AUTO: 46.4 %
MACROCYTES BLD QL SMEAR: PRESENT
MCH RBC QN AUTO: 33.8 PG (ref 26.5–33)
MCHC RBC AUTO-ENTMCNC: 33.1 G/DL (ref 31.5–36.5)
MCV RBC AUTO: 102 FL (ref 78–100)
MONOCYTES # BLD AUTO: 0 10E9/L (ref 0–1.3)
MONOCYTES NFR BLD AUTO: 0.9 %
NEUTROPHILS # BLD AUTO: 2.2 10E9/L (ref 1.6–8.3)
NEUTROPHILS NFR BLD AUTO: 51.8 %
OVALOCYTES BLD QL SMEAR: SLIGHT
PLATELET # BLD AUTO: 95 10E9/L (ref 150–450)
PLATELET # BLD EST: ABNORMAL 10*3/UL
POIKILOCYTOSIS BLD QL SMEAR: SLIGHT
POTASSIUM SERPL-SCNC: 4.5 MMOL/L (ref 3.4–5.3)
PROT SERPL-MCNC: 7.2 G/DL (ref 6.8–8.8)
RBC # BLD AUTO: 2.72 10E12/L (ref 4.4–5.9)
SODIUM SERPL-SCNC: 136 MMOL/L (ref 133–144)
VARIANT LYMPHS BLD QL SMEAR: PRESENT
WBC # BLD AUTO: 4.3 10E9/L (ref 4–11)

## 2020-06-11 PROCEDURE — 99285 EMERGENCY DEPT VISIT HI MDM: CPT | Mod: Z6 | Performed by: EMERGENCY MEDICINE

## 2020-06-11 PROCEDURE — 25800030 ZZH RX IP 258 OP 636: Performed by: EMERGENCY MEDICINE

## 2020-06-11 PROCEDURE — 99285 EMERGENCY DEPT VISIT HI MDM: CPT | Mod: 25 | Performed by: EMERGENCY MEDICINE

## 2020-06-11 PROCEDURE — C9803 HOPD COVID-19 SPEC COLLECT: HCPCS | Performed by: EMERGENCY MEDICINE

## 2020-06-11 PROCEDURE — 80053 COMPREHEN METABOLIC PANEL: CPT | Performed by: EMERGENCY MEDICINE

## 2020-06-11 PROCEDURE — 85025 COMPLETE CBC W/AUTO DIFF WBC: CPT | Performed by: EMERGENCY MEDICINE

## 2020-06-11 PROCEDURE — 96372 THER/PROPH/DIAG INJ SC/IM: CPT | Performed by: EMERGENCY MEDICINE

## 2020-06-11 PROCEDURE — 96360 HYDRATION IV INFUSION INIT: CPT | Performed by: EMERGENCY MEDICINE

## 2020-06-11 PROCEDURE — 96361 HYDRATE IV INFUSION ADD-ON: CPT | Performed by: EMERGENCY MEDICINE

## 2020-06-11 PROCEDURE — 25000132 ZZH RX MED GY IP 250 OP 250 PS 637: Mod: GY | Performed by: EMERGENCY MEDICINE

## 2020-06-11 PROCEDURE — 00000146 ZZHCL STATISTIC GLUCOSE BY METER IP

## 2020-06-11 RX ORDER — OLANZAPINE 5 MG/1
10 TABLET, ORALLY DISINTEGRATING ORAL ONCE
Status: COMPLETED | OUTPATIENT
Start: 2020-06-11 | End: 2020-06-11

## 2020-06-11 RX ADMIN — OLANZAPINE 10 MG: 5 TABLET, ORALLY DISINTEGRATING ORAL at 22:13

## 2020-06-11 RX ADMIN — SODIUM CHLORIDE, POTASSIUM CHLORIDE, SODIUM LACTATE AND CALCIUM CHLORIDE 1000 ML: 600; 310; 30; 20 INJECTION, SOLUTION INTRAVENOUS at 22:35

## 2020-06-11 NOTE — TELEPHONE ENCOUNTER
I called Miller's number twice to room him for his appointment with Dr. Davison for a psychiatric consultation . Both times I received his voicemail. The second time it went straight to voice mail. I left him messages both times, the last time telling him he could call me to reschedule if he wished.

## 2020-06-12 ENCOUNTER — TELEPHONE (OUTPATIENT)
Dept: BEHAVIORAL HEALTH | Facility: CLINIC | Age: 55
End: 2020-06-12

## 2020-06-12 PROBLEM — F30.9 MANIA (H): Status: ACTIVE | Noted: 2020-06-12

## 2020-06-12 LAB
AMPHETAMINES UR QL SCN: NEGATIVE
BARBITURATES UR QL: NEGATIVE
BENZODIAZ UR QL: NEGATIVE
CANNABINOIDS UR QL SCN: NEGATIVE
COCAINE UR QL: NEGATIVE
ETHANOL UR QL SCN: NEGATIVE
GLUCOSE BLDC GLUCOMTR-MCNC: 179 MG/DL (ref 70–99)
GLUCOSE BLDC GLUCOMTR-MCNC: 268 MG/DL (ref 70–99)
GLUCOSE BLDC GLUCOMTR-MCNC: 297 MG/DL (ref 70–99)
GLUCOSE BLDC GLUCOMTR-MCNC: 327 MG/DL (ref 70–99)
GLUCOSE BLDC GLUCOMTR-MCNC: 385 MG/DL (ref 70–99)
OPIATES UR QL SCN: NEGATIVE
SARS-COV-2 PCR COMMENT: NORMAL
SARS-COV-2 RNA SPEC QL NAA+PROBE: NEGATIVE
SARS-COV-2 RNA SPEC QL NAA+PROBE: NORMAL
SPECIMEN SOURCE: NORMAL
SPECIMEN SOURCE: NORMAL

## 2020-06-12 PROCEDURE — 90791 PSYCH DIAGNOSTIC EVALUATION: CPT

## 2020-06-12 PROCEDURE — 25000132 ZZH RX MED GY IP 250 OP 250 PS 637: Mod: GY | Performed by: EMERGENCY MEDICINE

## 2020-06-12 PROCEDURE — 25000131 ZZH RX MED GY IP 250 OP 636 PS 637: Mod: GY | Performed by: PHYSICIAN ASSISTANT

## 2020-06-12 PROCEDURE — 25000132 ZZH RX MED GY IP 250 OP 250 PS 637: Mod: GY | Performed by: PHYSICIAN ASSISTANT

## 2020-06-12 PROCEDURE — 25000128 H RX IP 250 OP 636

## 2020-06-12 PROCEDURE — 00000146 ZZHCL STATISTIC GLUCOSE BY METER IP

## 2020-06-12 PROCEDURE — 25000131 ZZH RX MED GY IP 250 OP 636 PS 637: Mod: GY | Performed by: EMERGENCY MEDICINE

## 2020-06-12 PROCEDURE — 90853 GROUP PSYCHOTHERAPY: CPT

## 2020-06-12 PROCEDURE — 80320 DRUG SCREEN QUANTALCOHOLS: CPT | Performed by: EMERGENCY MEDICINE

## 2020-06-12 PROCEDURE — 80307 DRUG TEST PRSMV CHEM ANLYZR: CPT | Performed by: EMERGENCY MEDICINE

## 2020-06-12 PROCEDURE — 99223 1ST HOSP IP/OBS HIGH 75: CPT | Performed by: PHYSICIAN ASSISTANT

## 2020-06-12 PROCEDURE — 12400001 ZZH R&B MH UMMC

## 2020-06-12 PROCEDURE — 99207 ZZC CONSULT E&M CHANGED TO INITIAL LEVEL: CPT | Performed by: PHYSICIAN ASSISTANT

## 2020-06-12 PROCEDURE — 25000132 ZZH RX MED GY IP 250 OP 250 PS 637: Mod: GY | Performed by: PSYCHIATRY & NEUROLOGY

## 2020-06-12 RX ORDER — CLONAZEPAM 0.5 MG/1
1 TABLET ORAL
Status: DISCONTINUED | OUTPATIENT
Start: 2020-06-12 | End: 2020-06-26 | Stop reason: HOSPADM

## 2020-06-12 RX ORDER — ALUMINA, MAGNESIA, AND SIMETHICONE 2400; 2400; 240 MG/30ML; MG/30ML; MG/30ML
30 SUSPENSION ORAL EVERY 4 HOURS PRN
Status: DISCONTINUED | OUTPATIENT
Start: 2020-06-12 | End: 2020-06-26 | Stop reason: HOSPADM

## 2020-06-12 RX ORDER — TACROLIMUS 1 MG/1
4 CAPSULE ORAL EVERY 12 HOURS
Status: DISCONTINUED | OUTPATIENT
Start: 2020-06-12 | End: 2020-06-26 | Stop reason: HOSPADM

## 2020-06-12 RX ORDER — ASPIRIN 81 MG/1
81 TABLET, CHEWABLE ORAL ONCE
Status: COMPLETED | OUTPATIENT
Start: 2020-06-12 | End: 2020-06-12

## 2020-06-12 RX ORDER — VITAMIN B COMPLEX
2000 TABLET ORAL DAILY
Status: DISCONTINUED | OUTPATIENT
Start: 2020-06-12 | End: 2020-06-26 | Stop reason: HOSPADM

## 2020-06-12 RX ORDER — ROSUVASTATIN CALCIUM 5 MG/1
5 TABLET, COATED ORAL DAILY
Status: DISCONTINUED | OUTPATIENT
Start: 2020-06-12 | End: 2020-06-26 | Stop reason: HOSPADM

## 2020-06-12 RX ORDER — MULTIPLE VITAMINS W/ MINERALS TAB 9MG-400MCG
1 TAB ORAL ONCE
Status: COMPLETED | OUTPATIENT
Start: 2020-06-12 | End: 2020-06-12

## 2020-06-12 RX ORDER — NICOTINE POLACRILEX 4 MG
15-30 LOZENGE BUCCAL
Status: DISCONTINUED | OUTPATIENT
Start: 2020-06-12 | End: 2020-06-26 | Stop reason: HOSPADM

## 2020-06-12 RX ORDER — POLYETHYLENE GLYCOL 3350 17 G/17G
1 POWDER, FOR SOLUTION ORAL DAILY
COMMUNITY
End: 2023-01-04

## 2020-06-12 RX ORDER — OLANZAPINE 5 MG/1
5 TABLET ORAL AT BEDTIME
Status: DISCONTINUED | OUTPATIENT
Start: 2020-06-12 | End: 2020-06-15

## 2020-06-12 RX ORDER — ASPIRIN 81 MG/1
81 TABLET, CHEWABLE ORAL ONCE
Status: DISCONTINUED | OUTPATIENT
Start: 2020-06-12 | End: 2020-06-12

## 2020-06-12 RX ORDER — ACETAMINOPHEN 325 MG/1
650 TABLET ORAL EVERY 4 HOURS PRN
Status: DISCONTINUED | OUTPATIENT
Start: 2020-06-12 | End: 2020-06-12

## 2020-06-12 RX ORDER — FERROUS SULFATE 325(65) MG
325 TABLET ORAL DAILY
Status: DISCONTINUED | OUTPATIENT
Start: 2020-06-13 | End: 2020-06-26 | Stop reason: HOSPADM

## 2020-06-12 RX ORDER — BISACODYL 10 MG
10 SUPPOSITORY, RECTAL RECTAL DAILY PRN
Status: DISCONTINUED | OUTPATIENT
Start: 2020-06-12 | End: 2020-06-26 | Stop reason: HOSPADM

## 2020-06-12 RX ORDER — POLYETHYLENE GLYCOL 3350 17 G/17G
17 POWDER, FOR SOLUTION ORAL DAILY
Status: DISCONTINUED | OUTPATIENT
Start: 2020-06-12 | End: 2020-06-26 | Stop reason: HOSPADM

## 2020-06-12 RX ORDER — TAMSULOSIN HYDROCHLORIDE 0.4 MG/1
0.4 CAPSULE ORAL DAILY
Status: DISCONTINUED | OUTPATIENT
Start: 2020-06-12 | End: 2020-06-26 | Stop reason: HOSPADM

## 2020-06-12 RX ORDER — LAMIVUDINE 100 MG/1
100 TABLET, FILM COATED ORAL DAILY
Status: DISCONTINUED | OUTPATIENT
Start: 2020-06-12 | End: 2020-06-26 | Stop reason: HOSPADM

## 2020-06-12 RX ORDER — ACETAMINOPHEN 325 MG/1
650 TABLET ORAL EVERY 4 HOURS PRN
Status: DISCONTINUED | OUTPATIENT
Start: 2020-06-12 | End: 2020-06-26 | Stop reason: HOSPADM

## 2020-06-12 RX ORDER — OLANZAPINE 10 MG/2ML
10 INJECTION, POWDER, FOR SOLUTION INTRAMUSCULAR ONCE
Status: COMPLETED | OUTPATIENT
Start: 2020-06-12 | End: 2020-06-12

## 2020-06-12 RX ORDER — ASPIRIN 81 MG/1
81 TABLET ORAL DAILY
Status: DISCONTINUED | OUTPATIENT
Start: 2020-06-13 | End: 2020-06-26 | Stop reason: HOSPADM

## 2020-06-12 RX ORDER — OLANZAPINE 10 MG/2ML
INJECTION, POWDER, FOR SOLUTION INTRAMUSCULAR
Status: COMPLETED
Start: 2020-06-12 | End: 2020-06-12

## 2020-06-12 RX ORDER — HYDROXYZINE HYDROCHLORIDE 25 MG/1
25 TABLET, FILM COATED ORAL EVERY 4 HOURS PRN
Status: DISCONTINUED | OUTPATIENT
Start: 2020-06-12 | End: 2020-06-26 | Stop reason: HOSPADM

## 2020-06-12 RX ORDER — DEXTROSE MONOHYDRATE 25 G/50ML
25-50 INJECTION, SOLUTION INTRAVENOUS
Status: DISCONTINUED | OUTPATIENT
Start: 2020-06-12 | End: 2020-06-26 | Stop reason: HOSPADM

## 2020-06-12 RX ORDER — LANOLIN ALCOHOL/MO/W.PET/CERES
1000 CREAM (GRAM) TOPICAL DAILY
Status: DISCONTINUED | OUTPATIENT
Start: 2020-06-12 | End: 2020-06-26 | Stop reason: HOSPADM

## 2020-06-12 RX ORDER — OLANZAPINE 10 MG/1
10 TABLET ORAL
Status: DISCONTINUED | OUTPATIENT
Start: 2020-06-12 | End: 2020-06-26 | Stop reason: HOSPADM

## 2020-06-12 RX ORDER — TACROLIMUS 1 MG/1
4 CAPSULE ORAL
Status: DISCONTINUED | OUTPATIENT
Start: 2020-06-12 | End: 2020-06-12

## 2020-06-12 RX ORDER — OLANZAPINE 10 MG/2ML
10 INJECTION, POWDER, FOR SOLUTION INTRAMUSCULAR
Status: DISCONTINUED | OUTPATIENT
Start: 2020-06-12 | End: 2020-06-26 | Stop reason: HOSPADM

## 2020-06-12 RX ORDER — MULTIPLE VITAMINS W/ MINERALS TAB 9MG-400MCG
1 TAB ORAL ONCE
Status: DISCONTINUED | OUTPATIENT
Start: 2020-06-12 | End: 2020-06-12

## 2020-06-12 RX ORDER — CARBOXYMETHYLCELLULOSE SODIUM 5 MG/ML
1 SOLUTION/ DROPS OPHTHALMIC
Status: DISCONTINUED | OUTPATIENT
Start: 2020-06-12 | End: 2020-06-26 | Stop reason: HOSPADM

## 2020-06-12 RX ADMIN — TAMSULOSIN HYDROCHLORIDE 0.4 MG: 0.4 CAPSULE ORAL at 18:18

## 2020-06-12 RX ADMIN — OLANZAPINE 10 MG: 10 INJECTION, POWDER, FOR SOLUTION INTRAMUSCULAR at 02:29

## 2020-06-12 RX ADMIN — POLYETHYLENE GLYCOL 3350 17 G: 17 POWDER, FOR SOLUTION ORAL at 18:18

## 2020-06-12 RX ADMIN — ASPIRIN 81 MG CHEWABLE TABLET 81 MG: 81 TABLET CHEWABLE at 08:12

## 2020-06-12 RX ADMIN — INSULIN ASPART 6 UNITS: 100 INJECTION, SOLUTION INTRAVENOUS; SUBCUTANEOUS at 18:16

## 2020-06-12 RX ADMIN — TACROLIMUS 4 MG: 1 CAPSULE ORAL at 08:12

## 2020-06-12 RX ADMIN — LAMIVUDINE 100 MG: 100 TABLET, FILM COATED ORAL at 18:18

## 2020-06-12 RX ADMIN — INSULIN DEGLUDEC INJECTION 26 UNITS: 100 INJECTION, SOLUTION SUBCUTANEOUS at 20:47

## 2020-06-12 RX ADMIN — OLANZAPINE 10 MG: 10 TABLET ORAL at 22:43

## 2020-06-12 RX ADMIN — OMEPRAZOLE 40 MG: 20 CAPSULE, DELAYED RELEASE ORAL at 08:13

## 2020-06-12 RX ADMIN — MULTIPLE VITAMINS W/ MINERALS TAB 1 TABLET: TAB at 08:13

## 2020-06-12 RX ADMIN — ACETAMINOPHEN 650 MG: 325 TABLET, FILM COATED ORAL at 06:37

## 2020-06-12 RX ADMIN — TACROLIMUS 4 MG: 1 CAPSULE ORAL at 20:48

## 2020-06-12 RX ADMIN — ROSUVASTATIN CALCIUM 5 MG: 5 TABLET, FILM COATED ORAL at 18:18

## 2020-06-12 RX ADMIN — OLANZAPINE 5 MG: 5 TABLET, FILM COATED ORAL at 20:48

## 2020-06-12 ASSESSMENT — ACTIVITIES OF DAILY LIVING (ADL)
DRESS: 0-->INDEPENDENT
AMBULATION: 0-->INDEPENDENT
HYGIENE/GROOMING: INDEPENDENT
TRANSFERRING: 0-->INDEPENDENT
HYGIENE/GROOMING: INDEPENDENT
SWALLOWING: 0-->SWALLOWS FOODS/LIQUIDS WITHOUT DIFFICULTY
TOILETING: 0-->INDEPENDENT
RETIRED_EATING: 0-->INDEPENDENT
ORAL_HYGIENE: INDEPENDENT
ORAL_HYGIENE: INDEPENDENT
LAUNDRY: WITH SUPERVISION
DRESS: INDEPENDENT
LAUNDRY: WITH SUPERVISION
FALL_HISTORY_WITHIN_LAST_SIX_MONTHS: NO
RETIRED_COMMUNICATION: 0-->UNDERSTANDS/COMMUNICATES WITHOUT DIFFICULTY
BATHING: 0-->INDEPENDENT
DRESS: INDEPENDENT
COGNITION: 0 - NO COGNITION ISSUES REPORTED
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
## 2020-06-12 NOTE — PHARMACY-ADMISSION MEDICATION HISTORY
Admission Medication History Completed by Pharmacy    See [facility] Admission Navigator for allergy information, preferred outpatient pharmacy, prior to admission medications and immunization status.     Medication history sources:  patient interview via phone, [employer], [facility] dispense history    Changes made to PTA medication list (reason)  Added:   - Miralax (per patient)  Deleted:   - alpha lipoic acid 600 mg daily (per pt)  - insulin glargine 28 units daily (most recently taking Novolog + Tresiba)  Changed: N/A    Additional medication history information:   - Patient was a good historian with current medications. He states he takes his medications religiously (with the exception of Zyprexa which he reports throwing out).  - Patient reports self-discontinuing Novolog + Tresiba on 5/27/2020. He states he did this without his doctor's advice as he felt his blood sugars were under control and he didn't need it.  - Patient requests timing of his tacrolimus doses to be 0600/1800      Prior to Admission medications    Medication Sig Last Dose Taking? Auth Provider   Acetaminophen (TYLENOL) 325 MG CAPS Take 325-650 mg by mouth every 4 hours as needed (pain, fever)  Yes Bebeto Park PA-C   Artificial Tear Solution (SM ARTIFICIAL TEARS) SOLN Place 1 drop into the right eye every hour as needed (dry eyes) Apply at least 4 times daily and as needed for dry eye  Yes Bebeto Park PA-C   aspirin 81 MG EC tablet Take 1 tablet (81 mg) by mouth daily  Yes Santi Rosas MD   econazole nitrate 1 % external cream Apply topically daily To feet and toenails.  Yes Lamin Gonzalez DPM   ferrous sulfate (FEROSUL) 325 (65 Fe) MG tablet Take 1 tablet (325 mg) by mouth daily (with breakfast)  Yes Santi Rosas MD   insulin aspart (NOVOLOG PEN) 100 UNIT/ML pen Inject 1 unit : 8 grams carb + sliding scale 4 times daily Max units per day   80 units daily .  Yes Tish Toscano PA-C   insulin  degludec (TRESIBA FLEXTOUCH) 100 UNIT/ML pen Inject 20 Units Subcutaneous daily  Yes Tish Toscano PA-C   lamiVUDine (EPIVIR) 100 MG tablet Take 1 tablet (100 mg) by mouth daily  Yes Yonathan Chino MD   magnesium oxide (MAG-OX) 400 MG tablet Take 1 tablet (400 mg) by mouth every evening  Yes Santi Rosas MD   Multiple Vitamin (THERAVITE PO) Take 1 tablet by mouth every morning   Yes Reported, Patient   OLANZapine (ZYPREXA) 5 MG tablet Take 1 tablet (5 mg) by mouth At Bedtime  Yes Santi Rosas MD   omeprazole (PRILOSEC) 40 MG DR capsule Take 1 capsule (40 mg) by mouth daily  Yes Bebeto Park PA-C   polyethylene glycol (MIRALAX) 17 g packet Take 1 packet by mouth daily  Yes Unknown, Entered By History   rosuvastatin (CRESTOR) 5 MG tablet Take 1 tablet (5 mg) by mouth daily  Yes Bebeto Park PA-C   tacrolimus (GENERIC EQUIVALENT) 1 MG capsule Take 4 capsules (4 mg) by mouth every 12 hours 6/12/2020 Yes Yonathan Chino MD   tamsulosin (FLOMAX) 0.4 MG capsule TAKE 1 CAPSULE(0.4 MG) BY MOUTH DAILY  Yes Bebeto Park PA-C   vitamin B-12 (CYANOCOBALAMIN) 1000 MCG tablet Take 1 tablet (1,000 mcg) by mouth daily  Yes Santi Rosas MD   vitamin D3 (CHOLECALCIFEROL) 2000 units (50 mcg) tablet Take 1 tablet (2,000 Units) by mouth daily  Yes Maximo Tucker,        Date completed: 06/12/20    Medication history completed by:   Lamin Hurst, Aubrey  [facility]  Emergency Department: Ascom *[phone]

## 2020-06-12 NOTE — ED NOTES
Mental health unit and [facility] ANS are concerned about the safety of this patient being placed on the mental health unit related to pt's non-compliance with insulin, transplant hx, and BG of 268. [place] ANS informed.

## 2020-06-12 NOTE — PROGRESS NOTES
06/12/20 1432   Valuables   Patient Belongings locker;remains with patient;sent to security per site process   Patient Belongings Remaining with Patient clothing   Patient Belongings Put in Hospital Secure Location (Security or Locker, etc.) other (see comments);plastic bag;clothing;shoes;tote   Did you bring any home meds/supplements to the hospital?  Yes   Disposition of meds  Sent to security/pharmacy per site process   HOSPITAL SAFE:  (2) med minders with assorted medications (security envelope #[id number]).    UNIT LOCKER #7:  (1) backpack with assorted paperwork, (1) pair of shoes with laces, (1) jacket, (1) aerosol can of suntanning lotion.    GIVEN TO PATIENT:  Assorted appropriate clothing articles.      Gonzalo Zheng   06/12/2020    Addendum: Upper and lower dentures found in shoe bag and given to patient. Patient is now wearing them.    Roula Oquendo RN    A               Admission:  I am responsible for any personal items that are not sent to the safe or pharmacy.  [facility] is not responsible for loss, theft or damage of any property in my possession.    Signature:  _________________________________ Date: _______  Time: _____                                              Staff Signature:  ____________________________ Date: ________  Time: _____      2nd Staff person, if patient is unable/unwilling to sign:    Signature: ________________________________ Date: ________  Time: _____     Discharge:  [facility] has returned all of my personal belongings:    Signature: _________________________________ Date: ________  Time: _____                                          Staff Signature:  ____________________________ Date: ________  Time: _____
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
## 2020-06-12 NOTE — ED PROVIDER NOTES
Emergency Department Patient Sign-out       Brief HPI:  This is a 55 year old male signed out to me by Dr. Morel .  See initial ED Provider note for details of the presentation.     Exam:   Patient Vitals for the past 24 hrs:   BP Temp Temp src Pulse Resp SpO2   06/12/20 0004 116/61 -- -- -- 16 100 %   06/11/20 2115 (!) 150/68 -- -- -- -- --   06/11/20 2114 -- 98.2  F (36.8  C) Oral 70 -- 98 %         ED RESULTS:   Results for orders placed or performed during the hospital encounter of 06/11/20 (from the past 24 hour(s))   Glucose by meter     Status: Abnormal    Collection Time: 06/11/20  9:22 PM   Result Value Ref Range    Glucose 350 (H) 70 - 99 mg/dL   CBC with platelets differential     Status: Abnormal    Collection Time: 06/11/20 10:18 PM   Result Value Ref Range    WBC 4.3 4.0 - 11.0 10e9/L    RBC Count 2.72 (L) 4.4 - 5.9 10e12/L    Hemoglobin 9.2 (L) 13.3 - 17.7 g/dL    Hematocrit 27.8 (L) 40.0 - 53.0 %     (H) 78 - 100 fl    MCH 33.8 (H) 26.5 - 33.0 pg    MCHC 33.1 31.5 - 36.5 g/dL    RDW 14.4 10.0 - 15.0 %    Platelet Count 95 (L) 150 - 450 10e9/L    Diff Method Manual Differential     % Neutrophils 51.8 %    % Lymphocytes 46.4 %    % Monocytes 0.9 %    % Eosinophils 0.9 %    % Basophils 0.0 %    Absolute Neutrophil 2.2 1.6 - 8.3 10e9/L    Absolute Lymphocytes 2.0 0.8 - 5.3 10e9/L    Absolute Monocytes 0.0 0.0 - 1.3 10e9/L    Absolute Eosinophils 0.0 0.0 - 0.7 10e9/L    Absolute Basophils 0.0 0.0 - 0.2 10e9/L    Poikilocytosis Slight     Ovalocytes Slight     Macrocytes Present     Reactive Lymphs Present     Platelet Estimate Confirming automated cell count    Comprehensive metabolic panel     Status: Abnormal    Collection Time: 06/11/20 10:18 PM   Result Value Ref Range    Sodium 136 133 - 144 mmol/L    Potassium 4.5 3.4 - 5.3 mmol/L    Chloride 103 94 - 109 mmol/L    Carbon Dioxide 28 20 - 32 mmol/L    Anion Gap 5 3 - 14 mmol/L    Glucose 305 (H) 70 - 99 mg/dL    Urea Nitrogen 36 (H) 7 -  30 mg/dL    Creatinine 1.71 (H) 0.66 - 1.25 mg/dL    GFR Estimate 44 (L) >60 mL/min/[1.73_m2]    GFR Estimate If Black 51 (L) >60 mL/min/[1.73_m2]    Calcium 9.2 8.5 - 10.1 mg/dL    Bilirubin Total 0.9 0.2 - 1.3 mg/dL    Albumin 3.9 3.4 - 5.0 g/dL    Protein Total 7.2 6.8 - 8.8 g/dL    Alkaline Phosphatase 122 40 - 150 U/L    ALT 26 0 - 70 U/L    AST 18 0 - 45 U/L   Drug abuse screen 6 urine (tox)     Status: None    Collection Time: 06/12/20 12:00 AM   Result Value Ref Range    Amphetamine Qual Urine Negative NEG^Negative    Barbiturates Qual Urine Negative NEG^Negative    Benzodiazepine Qual Urine Negative NEG^Negative    Cannabinoids Qual Urine Negative NEG^Negative    Cocaine Qual Urine Negative NEG^Negative    Ethanol Qual Urine Negative NEG^Negative    Opiates Qualitative Urine Negative NEG^Negative       ED MEDICATIONS:   Medications   OLANZapine zydis (zyPREXA) ODT tab 10 mg (10 mg Oral Given 6/11/20 2213)   lactated ringers BOLUS 1,000 mL (1,000 mLs Intravenous New Bag 6/11/20 2235)         Impression:  No diagnosis found.    Plan:    Pending studies include Behavioral Health Assessment.  Behavioral health  evaluated the patient and will plan on admission for stabilization for bipolar, disorganized racing thoughts, grandiose thinking. Denies suicide ideation, homicidal ideation, or intent to self harm.  Patient will be placed on a 72-hour hold.      MD Andre Pretty Linsey Gail, MD  06/12/20 8705

## 2020-06-12 NOTE — TELEPHONE ENCOUNTER
S:  8:30 AM  Patient in [facility] ED awaiting IP MH placement    R:  Patient has now been swabbed for COVID-19.  Clarified w/ patient's nurse that patient is ambulatory, without the use of an assistive device.  He was put in restraints during the night for increased agitation-approx. 30 minutes and has since been sleeping and/or calm and cooperative.      BS at 7:30 AM  268--patient is refusing insulin offered by ED staff-stating he does not take insulin for his Type 2 diabetes-manages his BS with diet and exercise.    Call to Station 30 charge nurse-she is concerned that patient's BS levels are  high and he is refusing insulin.  Paged  Hannah-unit supervisor-awaiting call back.     R:  9 AM  Per Hannah-patient can be medically managed on station 30.  Writer paged Dr. Díaz to discuss before notifying ED that patient can transfer.    R:  10 AM  Per Dr. Díaz-she is going to consult with Internal Medicine to discuss management of patient's diabetes if goes to station 30 and then call Intake back with direction.    R:  10:15 AM  Call to ED to update-writer told that patient is going to a medical floor for stabilization.  Dr. Díaz notified.  Removed from station 30 queue.    R:  11:30 AM  Received call from [facility] ANS requesting that patient be transferred to station 30 and not a medical unit.     R:  11:35 AM  Received call from Dr. Díaz, writer updated her on status of patient.  She will consult with  Internal Medicine and call writer back with acceptance decision.    R:  12 PM  Dr. Díaz accepted patient-placed in station 30 queue-called and gave charge nurse disposition and she said patient can transfer after nurse to nurse at 12:45 PM.  Writer updated patient's ED RN.

## 2020-06-12 NOTE — ED NOTES
"Pt becomes very agitated when 72hr hold pt rights is explained to the pt. Pt yells \"fuck you\" and states \"this is a felony, get marialuisa trump and put me in custodial\". IM zyprexa ordered, restraints applied, 1:1 watch initiated.   "

## 2020-06-12 NOTE — TELEPHONE ENCOUNTER
"Patient cleared and ready for behavioral bed placement: Yes     S: 54 y/o male in the [place] ED presenting with altered mental status.    B: Hx bipolar d/o, type 2 diabetes, liver transplant, stage 3 CKD, hypertension.  BIB EMS from [facility] after he asked for EMS because his blood sugar was too low.  Pt's blood sugar was 250 when EMS checked at the scene and pt was observed to be having tangential speech and disorganized thoughts.  Pt has been perseverating on his hemoglobin levels and is also in denial about his bipolar dx, stating it is wrong and he wants it removed from his medical record.   reported pt is paranoid, grandiose and attempted to bargain with her to give him some food before he answered questions.  Pt stated he went \"toe-to-toe with Richard Mendieta and he  in  and saw the [employer].  Denied hx of aggression, HI or substance abuse.    A: Ramona  Medically cleared.  Pt had a liver transplant in 2019.  COVID swab has been ordered.  Drug screen negative  Glucose 305 at 2218  Urea nitrogen 36  Creatinine 1.71  GFR estimate 44  GFR estimate if Black 51  RBC 2.72  Hemoglobin 9.2  Hematocrit 27.8  MCH 33.8  Platelet count 95    R: 0242 On-Call paged.  Awaiting callback.    0249 On-Call accepts Kenzie/Bre.  Placed in queue at 0252.  Unit notified at 0258 and is requesting report once COVID swab has been collected.  ED notified at 0301 and reporting pt has been placed on a 72 hr hold.  Awaiting hold paperwork.     0309 Hold paperwork received and faxed to the unit.    0341 ANS called to say pt has refused the COVID, insulin and getting his glucose checked and is not medically stable for admission to behavioral.  ANS will speak with ED.  Awaiting callback.    0355 ANS recommending pt be admitted to medical or board in the ED until stabilized for admission to .  ANS will continue to follow and will notify Intake with pertinent updates.  "

## 2020-06-12 NOTE — PROGRESS NOTES
"Patient arrived  6/12/2020 to station 30N from [place] ED.    EPIC Admitting  DX: Bipolar I disorder (H) [F31.9]    Vital signs reviewed related to admission.  BP (!) 152/79 (BP Location: Left arm)   Pulse 82   Temp 97.9  F (36.6  C)   Resp 18   SpO2 100% .    Patient arrived on the unit and was cooperative with the clothing search and admission profile interview. Patient when asked what the reason for his admission was replied by saying, \" I have no fucken idea and I don't care!\". Patient was very upset with the ED physician who placed him on a 72 hour hold.     Doesn't believe he needs to be here stating, \" I'm a little paranoid and a little agitated but that's it\". Denies being depression but states that he feels \"sad\". Denies any auditory or visual hallucinations today but says on Wednesday morning he heard a voice telling him to run. Patient also talked at great lengths about being angry with [employer] for withholding his 1 million dollars. He says that he attempted to withdraw that amount but they denied him from doing that. Also talking about [employer] also refusing to give him money and the DMV not issuing him a new license. Denies suicidal ideation and self injurious thoughts. States that he has never been suicidal and that it is against his beliefs. Rates his anxiety 2/10 (1 low-10 high). States that his sleep has been poor for the last 2 months averages less than an hour per night. Patient's affect is tense and uses profanities but also apologetic for his irritibility and cooperative on unit. Made phone calls. Ate a small snack.     Requesting to talk with a  or a .     Patient was placed on a 72 hour hold at the [place] ED. Paperwork and rights were given to him.            "

## 2020-06-12 NOTE — CONSULTS
[facility]  Internal Medicine Consult     Frandy Workman MRN# [id number]   Age: 55 year old YOB: 1965     Date of Admission: 6/11/2020  Date of Consult:  6/12/2020    Requesting Service: Behavioral Health - Elsa Díaz MD         Reason for Consult:   Complex medical patient         Assessment and Recommendations:   Frandy Workman is a 55 year old male w/ a pmhx of ESTARDA cirrhosis c/b ascites, HE and HCC s/p liver transplant 11/11/19, HTN, HLD, DMII, GERD, BPH and CKD who was admitted to [facility] station 30N due to decompensated psychiatric illness.    # Bipolar I disorder.  Management per primary team, psychiatry.     S/p orthotopic liver transplant (11/11/19) 2/2 ESLD r/t ESTRADA, HCC. Follows with Dr. Banuelos of hepatology, most recently seen via virtual visit on 5/26/20. LFT's normal. Renal function stable. Seen by Oncology 5/26/20 and no evidence of recurrent HCC on recent imagining. Unclear how compliant pt has been with medications given recent decline in psychiatric state.  - Immunosuppression: tacro 4mg q12h, goal 6-8, most recent 5.8 on 6/3/20   Tacro level in am  - Continue lamivudine for ppx  - Follow up with Hepatology in 3 months as previously directed    DMII with retinopathy. Most recent Hgb A1C 4.8 on 3/4. Recent virtual visit with Endocrine provider on 5/7/20. Last seen by ophthalmology 3/4/20.   - Tresiba 26 units daily  - Novolog 1 unit per 12 grams CHO ac and with snacks  - MSSI  - BG TID ac, at bedtime, and 0200  - Hypoglycemia protocol  - Will need follow up with Ophthalmology on discharge (was supposed to f/u in April)    JERONIMO on CKD stage III. 2/2 diabetic nephropathy and tacrolimus therapy s/p transplant. Bl Cr ~ 1.5, 1.7 on admission s/p 1L LR bolus in ED.  Last seen by nephrology 3/2/20.  - Per nephrology, prefer higher blood pressures to ensure adequate renal perfusion, not on any BP meds  - Continue Iron supplementation for anemia of CKD  - Avoid  "nephrotoxins  - Is due for follow up with nephrology on discharge  - Repeat BMP in am to ensure improvement in Cr following IVF    BPH. Continue flomax.   HLD. Continue statin.       IM will follow for am labs as well as BG management.      Margaret Marin PA-C  Hospitalist Service  [phone]           History of Present Illness:   Frandy Workman is a 55 year old male w/ a pmhx of ESTRADA cirrhosis c/b ascites, HE and HCC s/p liver transplant 11/11/19, HTN, HLD, DMII, GERD, BPH and CKD who was admitted to [facility] station 30N due to decompensated psychiatric illness.  Pt with pressured speech on interview, states that we are holding him hostage, and demands that we take \"bipolar disorder\" out of his medical chart. He is also currently in denial of need for insulin, states that he spoke with an MD in [place] who told him that he no longer needs it. Does note sounds like he is checking his BG outpatient. States that he has not been taking his insulin since 5/20.   In regards to tacro regimen, he states that he is typically very consistent with his dosing, but does note that he missed a few doses earlier this week.   Currently he denies any chest pain, shortness of breath or difficulty breathing. He notes some abdominal pain in his RLQ but states that is secondary to a fall on his property early this week (also notes that he is in the process of suing the property owners). He denies any nausea, vomiting, cough, headaches or fevers.          Review of Systems:   A 10 point review of systems was performed and is negative unless otherwise noted in HPI.          Past Medical History:     Past Medical History:   Diagnosis Date     Anemia 2013     Arthritis      BPH (benign prostatic hyperplasia)      CAD (coronary artery disease) 4/1/2019     Cholelithiasis      Conductive hearing loss 08/16/2017     Depressive disorder 1986    Suffer effects throughout life     Gastroesophageal reflux disease 12/01/2014     HCC " (hepatocellular carcinoma) (H) 1/22/2019     History of diabetic retinopathy 07/2018     HTN (hypertension)      HTN (hypertension) 11/20/2019     Hyperlipidemia      Liver cirrhosis secondary to ESTRADA (H)      Liver transplanted (H) 11/11/2019     Portal vein thrombosis 4/11/2019     Type II diabetes mellitus (H)              Past Surgical History:      Past Surgical History:   Procedure Laterality Date     COLONOSCOPY      2015     COLONOSCOPY N/A 12/6/2019    Procedure: COLONOSCOPY, WITH POLYPECTOMY AND BIOPSY;  Surgeon: Adam Morton MD;  Location:  GI     CV HEART CATHETERIZATION WITH POSSIBLE INTERVENTION N/A 2/26/2019    Procedure: CORS;  Surgeon: Jagdish Hoyt MD;  Location:  HEART CARDIAC CATH LAB     ESOPHAGOSCOPY, GASTROSCOPY, DUODENOSCOPY (EGD), COMBINED N/A 11/17/2016    Procedure: COMBINED ESOPHAGOSCOPY, GASTROSCOPY, DUODENOSCOPY (EGD);  Surgeon: Santi Rosas MD;  Location:  GI     ESOPHAGOSCOPY, GASTROSCOPY, DUODENOSCOPY (EGD), COMBINED N/A 11/17/2017    Procedure: COMBINED ESOPHAGOSCOPY, GASTROSCOPY, DUODENOSCOPY (EGD);  EGD;  Surgeon: Santi Rosas MD;  Location:  GI     ESOPHAGOSCOPY, GASTROSCOPY, DUODENOSCOPY (EGD), COMBINED N/A 12/28/2018    Procedure: EGD;  Surgeon: Santi Rosas MD;  Location: [facility] OR     ESOPHAGOSCOPY, GASTROSCOPY, DUODENOSCOPY (EGD), COMBINED N/A 12/6/2019    Procedure: ESOPHAGOGASTRODUODENOSCOPY, WITH BIOPSY;  Surgeon: Adam Morton MD;  Location:  GI     ESOPHAGOSCOPY, GASTROSCOPY, DUODENOSCOPY (EGD), COMBINED N/A 2/13/2020    Procedure: ESOPHAGOGASTRODUODENOSCOPY (EGD);  Surgeon: Santi Rosas MD;  Location:  GI     HEAD & NECK SURGERY      12/2017 at [facility].      IMPLANT GOLD WEIGHT EYELID Right 11/16/2017    Procedure: IMPLANT WEIGHT EYELID;  Right Upper Eyelid Weight, right tarsal strip lower eyelid;  Surgeon: Milana Malave MD;  Location: [facility] OR     IR CHEMO EMBOLIZATION  1/22/2019     KNEE SURGERY  Left      ORTHOPEDIC SURGERY       PAROTIDECTOMY, RADICAL NECK DISSECTION Right 2017    Procedure: PAROTIDECTOMY, RADICAL NECK DISSECTION;  Right Superfacial Parotidectomy , Facial nerve repair. with [facility] facial nerve monitor.;  Surgeon: Asiya Morgan MD;  Location: [facility] OR     PICC INSERTION Left 2017    4fr SL BioFlo PICC, 44cm in the L basilic vein w/ tip in the low SVC     RETURN LIVER TRANSPLANT N/A 2019    Procedure: Exploratory laparotomy, hematoma evacuation, abdominal washout;  Surgeon: Александр Ramos MD;  Location: [facility] OR     TRANSPLANT LIVER RECIPIENT  DONOR N/A 2019    Procedure: TRANSPLANT, LIVER, RECIPIENT,  DONOR;  Surgeon: Александр Ramos MD;  Location: [facility] OR     VASCULAR SURGERY               Family History:   Family history was reviewed.      Family History   Problem Relation Age of Onset     Prostate Cancer Maternal Grandfather      Substance Abuse Maternal Grandfather         Alcohol     Colon Cancer Father 60     Pancreatic Cancer Father 60     Prostate Cancer Father      Colorectal Cancer Father      Macular Degeneration Father      Cancer Father      Glaucoma Father      Skin Cancer Father      Colorectal Cancer Maternal Grandmother      Cancer Maternal Grandmother      Substance Abuse Maternal Grandmother         Alcohol     Colorectal Cancer Paternal Grandmother      Cancer Mother      Diabetes Mother          3/2016     Cerebrovascular Disease Mother         Passed away in Feb of this year, 80 years old.     Thyroid Disease Mother      Depression Mother      Asthma Sister         Had since birth     Thyroid Disease Sister      Depression Sister      Liver Disease No family hx of      Melanoma No family hx of              Social History:     Social History     Tobacco Use     Smoking status: Former Smoker     Packs/day: 6.00     Years: 30.00     Pack years: 180.00     Types: Cigars     Start date: 2016     Last attempt to quit:  10/25/2017     Years since quittin.6     Smokeless tobacco: Former User     Types: Chew     Quit date: 10/31/2017     Tobacco comment: 1 tin per week   Substance Use Topics     Alcohol use: No     Alcohol/week: 0.0 standard drinks     Comment: quit 1996             Medications:   No current facility-administered medications on file prior to encounter.   Acetaminophen (TYLENOL) 325 MG CAPS, Take 325-650 mg by mouth every 4 hours as needed (pain, fever)  alpha-lipoic acid 600 MG capsule, Take 1 capsule (600 mg) by mouth daily  Artificial Tear Solution (SM ARTIFICIAL TEARS) SOLN, Place 1 drop into the right eye every hour as needed (dry eyes) Apply at least 4 times daily and as needed for dry eye  aspirin 81 MG EC tablet, Take 1 tablet (81 mg) by mouth daily  BD VIKTORIA U/F 32G X 4 MM insulin pen needle, Use 5 per day  blood glucose (ACCU-CHEK EDINSON PLUS) test strip, USE TO TEST FOUR TIMES DAILY OR AS DIRECTED  blood glucose monitoring (ACCU-CHEK FASTCLIX) lancets, Use to test blood sugar 4 times daily or as directed.  1 box = 102 lancets  Continuous Blood Gluc  (FREESTYLE CLAUDIA 14 DAY READER) CECILIO, Use to read blood sugars as per 's instructions.  Continuous Blood Gluc Sensor (FREESTYLE CLAUDIA 14 DAY SENSOR) [facility], Change every 14 days.  econazole nitrate 1 % external cream, Apply topically daily To feet and toenails.  ferrous sulfate (FEROSUL) 325 (65 Fe) MG tablet, Take 1 tablet (325 mg) by mouth daily (with breakfast)  Glucagon 3 MG/DOSE POWD, Spray 1 Dose in nostril as needed (for low blood sugar with sedation or emesis (unable to ingest glucose))  glucose (BD GLUCOSE) 4 g chewable tablet, Take 1 tablet by mouth every hour as needed for low blood sugar  insulin aspart (NOVOLOG PEN) 100 UNIT/ML pen, Inject 1 unit : 8 grams carb + sliding scale 4 times daily Max units per day   80 units daily .  insulin degludec (TRESIBA FLEXTOUCH) 100 UNIT/ML pen, Inject 20 Units Subcutaneous daily  insulin  glargine (BASAGLAR KWIKPEN) 100 UNIT/ML pen, Inject 28 Units Subcutaneous daily  lamiVUDine (EPIVIR) 100 MG tablet, Take 1 tablet (100 mg) by mouth daily  magnesium oxide (MAG-OX) 400 MG tablet, Take 1 tablet (400 mg) by mouth every evening  Multiple Vitamin (THERAVITE PO), Take 1 tablet by mouth every morning   OLANZapine (ZYPREXA) 5 MG tablet, Take 1 tablet (5 mg) by mouth At Bedtime  omeprazole (PRILOSEC) 40 MG DR capsule, Take 1 capsule (40 mg) by mouth daily  rosuvastatin (CRESTOR) 5 MG tablet, Take 1 tablet (5 mg) by mouth daily  tacrolimus (GENERIC EQUIVALENT) 1 MG capsule, Take 4 capsules (4 mg) by mouth every 12 hours  tamsulosin (FLOMAX) 0.4 MG capsule, TAKE 1 CAPSULE(0.4 MG) BY MOUTH DAILY  vitamin B-12 (CYANOCOBALAMIN) 1000 MCG tablet, Take 1 tablet (1,000 mcg) by mouth daily  vitamin D3 (CHOLECALCIFEROL) 2000 units (50 mcg) tablet, Take 1 tablet (2,000 Units) by mouth daily        Current Facility-Administered Medications   Medication     acetaminophen (TYLENOL) tablet 650 mg     tacrolimus (GENERIC EQUIVALENT) capsule 4 mg            Allergies:     Allergies   Allergen Reactions     Codeine Other (See Comments)     Cannot take due to liver  Cannot tolerate oral narcotics     Seasonal Allergies      Sneezing, coughing, runny and itchy eyes             Physical Exam:   BP (!) 152/79 (BP Location: Left arm)   Pulse 82   Temp 97.9  F (36.6  C)   Resp 18   SpO2 100%    GENERAL: Alert and oriented x 3. NAD. Pressured speech, hyperverbal.   HEENT: Anicteric sclera. PERRL. Mucous membranes moist.   CV: RRR. S1, S2. No murmurs appreciated.   RESPIRATORY: Effort normal. Lungs CTAB with no wheezing, rales, rhonchi.   GI: Abdomen soft and non distended with normoactive bowel sounds present in all quadrants. No tenderness, rebound, guarding.   MUSCULOSKELETAL: No joint swelling or tenderness.   NEUROLOGICAL: No focal deficits. Moves all extremities.   EXTREMITIES: No peripheral edema. Intact bilateral pedal  pulses.   SKIN: No jaundice. No rashes.          Labs:   CBC:  Recent Labs   Lab Test 06/11/20  2218   WBC 4.3   RBC 2.72*   HGB 9.2*   HCT 27.8*   *   MCH 33.8*   MCHC 33.1   RDW 14.4   PLT 95*       CMP:  Recent Labs   Lab Test 06/11/20  2218      POTASSIUM 4.5   CHLORIDE 103   DEBORAH 9.2   CO2 28   BUN 36*   CR 1.71*   *   AST 18   ALT 26   BILITOTAL 0.9   ALBUMIN 3.9   PROTTOTAL 7.2   ALKPHOS 122       TSH:  TSH   Date Value Ref Range Status   01/24/2020 1.91 0.40 - 4.00 mU/L Final             DARRELL MolinaC  Hospitalist Service  [phone]

## 2020-06-13 LAB
ANION GAP SERPL CALCULATED.3IONS-SCNC: 8 MMOL/L (ref 3–14)
BUN SERPL-MCNC: 34 MG/DL (ref 7–30)
CALCIUM SERPL-MCNC: 8.8 MG/DL (ref 8.5–10.1)
CHLORIDE SERPL-SCNC: 101 MMOL/L (ref 94–109)
CO2 SERPL-SCNC: 23 MMOL/L (ref 20–32)
CREAT SERPL-MCNC: 1.44 MG/DL (ref 0.66–1.25)
GFR SERPL CREATININE-BSD FRML MDRD: 54 ML/MIN/{1.73_M2}
GLUCOSE BLDC GLUCOMTR-MCNC: 170 MG/DL (ref 70–99)
GLUCOSE BLDC GLUCOMTR-MCNC: 187 MG/DL (ref 70–99)
GLUCOSE BLDC GLUCOMTR-MCNC: 193 MG/DL (ref 70–99)
GLUCOSE BLDC GLUCOMTR-MCNC: 197 MG/DL (ref 70–99)
GLUCOSE BLDC GLUCOMTR-MCNC: 219 MG/DL (ref 70–99)
GLUCOSE BLDC GLUCOMTR-MCNC: 234 MG/DL (ref 70–99)
GLUCOSE BLDC GLUCOMTR-MCNC: 261 MG/DL (ref 70–99)
GLUCOSE BLDC GLUCOMTR-MCNC: 275 MG/DL (ref 70–99)
GLUCOSE BLDC GLUCOMTR-MCNC: 286 MG/DL (ref 70–99)
GLUCOSE SERPL-MCNC: 245 MG/DL (ref 70–99)
HBA1C MFR BLD: 6.3 % (ref 0–5.6)
POTASSIUM SERPL-SCNC: 4.3 MMOL/L (ref 3.4–5.3)
SODIUM SERPL-SCNC: 132 MMOL/L (ref 133–144)
TACROLIMUS BLD-MCNC: 4.8 UG/L (ref 5–15)
TME LAST DOSE: ABNORMAL H
VIT B12 SERPL-MCNC: 682 PG/ML (ref 193–986)

## 2020-06-13 PROCEDURE — 00000146 ZZHCL STATISTIC GLUCOSE BY METER IP

## 2020-06-13 PROCEDURE — 25000132 ZZH RX MED GY IP 250 OP 250 PS 637: Mod: GY | Performed by: PHYSICIAN ASSISTANT

## 2020-06-13 PROCEDURE — 25000131 ZZH RX MED GY IP 250 OP 636 PS 637: Mod: GY | Performed by: PHYSICIAN ASSISTANT

## 2020-06-13 PROCEDURE — 90853 GROUP PSYCHOTHERAPY: CPT

## 2020-06-13 PROCEDURE — 99223 1ST HOSP IP/OBS HIGH 75: CPT | Mod: 95 | Performed by: NURSE PRACTITIONER

## 2020-06-13 PROCEDURE — 82607 VITAMIN B-12: CPT | Performed by: PSYCHIATRY & NEUROLOGY

## 2020-06-13 PROCEDURE — 12400001 ZZH R&B MH UMMC

## 2020-06-13 PROCEDURE — 80048 BASIC METABOLIC PNL TOTAL CA: CPT | Performed by: PSYCHIATRY & NEUROLOGY

## 2020-06-13 PROCEDURE — 80197 ASSAY OF TACROLIMUS: CPT | Performed by: PHYSICIAN ASSISTANT

## 2020-06-13 PROCEDURE — 25000132 ZZH RX MED GY IP 250 OP 250 PS 637: Mod: GY | Performed by: PSYCHIATRY & NEUROLOGY

## 2020-06-13 PROCEDURE — 36415 COLL VENOUS BLD VENIPUNCTURE: CPT | Performed by: PHYSICIAN ASSISTANT

## 2020-06-13 PROCEDURE — 83036 HEMOGLOBIN GLYCOSYLATED A1C: CPT | Performed by: PHYSICIAN ASSISTANT

## 2020-06-13 PROCEDURE — 36415 COLL VENOUS BLD VENIPUNCTURE: CPT | Performed by: PSYCHIATRY & NEUROLOGY

## 2020-06-13 RX ADMIN — INSULIN ASPART 5 UNITS: 100 INJECTION, SOLUTION INTRAVENOUS; SUBCUTANEOUS at 13:10

## 2020-06-13 RX ADMIN — MELATONIN 2000 UNITS: at 08:16

## 2020-06-13 RX ADMIN — TACROLIMUS 4 MG: 1 CAPSULE ORAL at 08:19

## 2020-06-13 RX ADMIN — CLONAZEPAM 1 MG: 0.5 TABLET ORAL at 22:11

## 2020-06-13 RX ADMIN — TAMSULOSIN HYDROCHLORIDE 0.4 MG: 0.4 CAPSULE ORAL at 08:16

## 2020-06-13 RX ADMIN — INSULIN ASPART 8 UNITS: 100 INJECTION, SOLUTION INTRAVENOUS; SUBCUTANEOUS at 08:51

## 2020-06-13 RX ADMIN — POLYETHYLENE GLYCOL 3350 17 G: 17 POWDER, FOR SOLUTION ORAL at 08:16

## 2020-06-13 RX ADMIN — ROSUVASTATIN CALCIUM 5 MG: 5 TABLET, FILM COATED ORAL at 08:17

## 2020-06-13 RX ADMIN — ACETAMINOPHEN 650 MG: 325 TABLET, FILM COATED ORAL at 04:38

## 2020-06-13 RX ADMIN — ASPIRIN 81 MG: 81 TABLET ORAL at 13:13

## 2020-06-13 RX ADMIN — INSULIN DEGLUDEC INJECTION 26 UNITS: 100 INJECTION, SOLUTION SUBCUTANEOUS at 21:35

## 2020-06-13 RX ADMIN — OLANZAPINE 5 MG: 5 TABLET, FILM COATED ORAL at 20:19

## 2020-06-13 RX ADMIN — INSULIN ASPART 9 UNITS: 100 INJECTION, SOLUTION INTRAVENOUS; SUBCUTANEOUS at 18:52

## 2020-06-13 RX ADMIN — CYANOCOBALAMIN TAB 1000 MCG 1000 MCG: 1000 TAB at 08:17

## 2020-06-13 RX ADMIN — TACROLIMUS 4 MG: 1 CAPSULE ORAL at 20:19

## 2020-06-13 RX ADMIN — LAMIVUDINE 100 MG: 100 TABLET, FILM COATED ORAL at 08:18

## 2020-06-13 RX ADMIN — FERROUS SULFATE TAB 325 MG (65 MG ELEMENTAL FE) 325 MG: 325 (65 FE) TAB at 08:19

## 2020-06-13 RX ADMIN — ACETAMINOPHEN 650 MG: 325 TABLET, FILM COATED ORAL at 13:13

## 2020-06-13 ASSESSMENT — ACTIVITIES OF DAILY LIVING (ADL)
HYGIENE/GROOMING: HANDWASHING;SHOWER;INDEPENDENT
LAUNDRY: WITH SUPERVISION
ORAL_HYGIENE: INDEPENDENT
DRESS: SCRUBS (BEHAVIORAL HEALTH);INDEPENDENT

## 2020-06-13 NOTE — PROGRESS NOTES
"SPIRITUAL HEALTH SERVICES  [facility] Station 30  ON-CALL VISIT     REFERRAL SOURCE: I did visit this morning patient Frandy parsons [facility]  referral. Pt is new to the unit and I introduced myself as the unit  and gave him all the info about the [facility].      Pt was so glad talking with this  and said, \"I am here for reason but I don't know why? I lost everything I had and I am broke. However, I do need God's help during my stay in the unit and even after I discharge home. I talked a lot against [employer] and I do need forgiveness for my sins and need your prayer.\" After I listened the pt reflectively and then I shared some up lifting and hope giving message from the Bible. At the end of our conversation, based on his prayer request, I offered him a prayer. Pt appreciated the  phone visit.     PLAN: The unit  will provide spiritual care for the pt as needed.     Janny Lewis M.Div. ([place]), M.Th., D.Min., [facility]  Staff   Pager [phone]    "
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
## 2020-06-13 NOTE — PROGRESS NOTES
"Pt appears to have slept only 2.25 hours overnight. Has been up pacing the halls and speaking with staff intermittently with frequent reminders to try to lay down and get some sleep. Pt refused all medication for sleep multiple times. Pt stated \"you know what, I changed my mind about that ED doc, I'm not gonna go after him, what I want him to know is he needs to be a doctor.\" Pt also stated when he speaks with the provider in the morning he didn't \"want to hear one word about mental health,\" this writer reminded him that he would be speaking with a psychiatrist so they will be focusing on mental health and have questions but all he needed to do was speak with them and answer honestly how he was feeling. Pt appeared accepting of this. Pt noted to have a fine tremor in his hands, also wrote a timeline of events he would like shared with staff in a notebook. There are several odd delusional statements written down, notebook has been placed with pt chart for review. Pt has otherwise been pleasant and polite upon interaction with staff, needs reminders to maintain appropriate boundaries and refrain from touching peers or staff. Will continue to monitor and support plan of care.   "

## 2020-06-13 NOTE — PLAN OF CARE
"Miller has been in community areas of the unit 70% of the time this evening. He has been cooperative with his blood sugar monitoring and Insulin administration. He remains, and verbalizes, being angry about being placed on a 72 hr. Hold. He also has expressed to this writer that \"Come Monday, if the words \"Mental Health Illness\" isn't erased from his chart he is going to blow.\" He has been Polite and pleasant at the medication window. His voice is loud and he appears tense. He is grandiose. He rambles, he is very concerned about current social issues. His appetite has been good. At times he can come across as being agitated. He has been  Over complimentary to the Nursing staff. His pants are too big and he has constantly pulling them up. He walked out of the shower x1 wearing only a towel. He has taken Zyprexa 5 mg. An hour ago and is requesting more at this time. Nursing to continue current plan of care.   "

## 2020-06-13 NOTE — PROGRESS NOTES
06/13/20 1300   Behavioral Health   Hallucinations denies / not responding to hallucinations   Thinking distractable;poor concentration   Memory other (see comment)  (Has difficulty retaining answers given to his questions.)   Insight denial of illness   Judgement impaired   Eye Contact at examiner   Affect full range affect   Mood anxious;other (see comments)  (Reports feeling like a ten out of ten.)   Physical Appearance/Attire appears older than stated age   Hygiene well groomed;other (see comment)  (Showered in early a.m. hours.)   Suicidality other (see comments)  (Denies.)   1. Wish to be Dead (Recent) No   2. Non-Specific Active Suicidal Thoughts (Recent) No   Elopement   (Remains on elopment precautions.)   Activity restless;other (see comment)   Speech other (see comments)  (Highly verbal.)   Medication Sensitivity no stated side effects;no observed side effects   Psychomotor / Gait balanced;steady

## 2020-06-13 NOTE — PROGRESS NOTES
Brief Medicine Note    Reviewed repeat labs this morning, notable for improvement in Cr to baseline (1.44). Hgb A1C slightly elevated at 6.3 (4.8), consistent with poor compliance with insulin regimen prior to admission.   Tacro level check this morning and low (4.8). Again, not suprising as he has been disorganized leading up to admission and even admitted to missing some doses this week. Discussed tacro level with transplant service, who recommended continuing PTA dose with recheck of level on Monday. This ordered was placed.     IM will continue to follow peripherally for BG management (which is improving with resumption of PTA insulin) as well as repeat tacro level on Monday 6/15.     Today's vital signs, medications, and nursing notes were reviewed.     /71   Pulse 99   Temp 97.4  F (36.3  C) (Oral)   Resp 18   SpO2 99%         Margaret Marin PA-C  Hospitalist Service  Pager [phone]

## 2020-06-13 NOTE — PROGRESS NOTES
"   06/12/20 1700   Group Therapy Session   Group Attendance attended group session   Total Time (minutes) 45   Group Type psychotherapeutic   Patient Participation/Contribution disorganized;cooperative with task;listened actively;verbalizations were off topic   Psychotherapy group goals: Identify and share responses to 4 questions (worries, loves/likes, things to let go, wants).    Miller reports feeling \"disrespected\" due to being on a 72 hour hold. He reports feeling anxious and irritated. He presents as irritated, hyperverbal, tangential (politics, \"the big 5-[employer], [employer], etc,\" \"[place]\"). Although agitated, he made it clear that he was not angry with anyone in the group. He was polite to others and offered to let others in the group share first.  He accepted redirection. Reported appreciating the group. He appropriately participated in the activities and shared with the group.      "

## 2020-06-14 LAB
GLUCOSE BLDC GLUCOMTR-MCNC: 109 MG/DL (ref 70–99)
GLUCOSE BLDC GLUCOMTR-MCNC: 112 MG/DL (ref 70–99)
GLUCOSE BLDC GLUCOMTR-MCNC: 115 MG/DL (ref 70–99)
GLUCOSE BLDC GLUCOMTR-MCNC: 121 MG/DL (ref 70–99)
GLUCOSE BLDC GLUCOMTR-MCNC: 135 MG/DL (ref 70–99)
GLUCOSE BLDC GLUCOMTR-MCNC: 151 MG/DL (ref 70–99)
GLUCOSE BLDC GLUCOMTR-MCNC: 155 MG/DL (ref 70–99)
GLUCOSE BLDC GLUCOMTR-MCNC: 214 MG/DL (ref 70–99)
GLUCOSE BLDC GLUCOMTR-MCNC: 90 MG/DL (ref 70–99)

## 2020-06-14 PROCEDURE — 12400001 ZZH R&B MH UMMC

## 2020-06-14 PROCEDURE — 25000132 ZZH RX MED GY IP 250 OP 250 PS 637: Mod: GY | Performed by: PSYCHIATRY & NEUROLOGY

## 2020-06-14 PROCEDURE — 25000132 ZZH RX MED GY IP 250 OP 250 PS 637: Mod: GY | Performed by: PHYSICIAN ASSISTANT

## 2020-06-14 PROCEDURE — 00000146 ZZHCL STATISTIC GLUCOSE BY METER IP

## 2020-06-14 PROCEDURE — 25000131 ZZH RX MED GY IP 250 OP 636 PS 637: Mod: GY | Performed by: PHYSICIAN ASSISTANT

## 2020-06-14 RX ADMIN — MELATONIN 2000 UNITS: at 08:28

## 2020-06-14 RX ADMIN — INSULIN ASPART 3 UNITS: 100 INJECTION, SOLUTION INTRAVENOUS; SUBCUTANEOUS at 13:09

## 2020-06-14 RX ADMIN — TACROLIMUS 4 MG: 1 CAPSULE ORAL at 18:28

## 2020-06-14 RX ADMIN — ROSUVASTATIN CALCIUM 5 MG: 5 TABLET, FILM COATED ORAL at 08:28

## 2020-06-14 RX ADMIN — TAMSULOSIN HYDROCHLORIDE 0.4 MG: 0.4 CAPSULE ORAL at 08:28

## 2020-06-14 RX ADMIN — POLYETHYLENE GLYCOL 3350 17 G: 17 POWDER, FOR SOLUTION ORAL at 08:28

## 2020-06-14 RX ADMIN — INSULIN ASPART 3 UNITS: 100 INJECTION, SOLUTION INTRAVENOUS; SUBCUTANEOUS at 08:41

## 2020-06-14 RX ADMIN — CYANOCOBALAMIN TAB 1000 MCG 1000 MCG: 1000 TAB at 08:28

## 2020-06-14 RX ADMIN — ASPIRIN 81 MG: 81 TABLET ORAL at 08:28

## 2020-06-14 RX ADMIN — INSULIN DEGLUDEC INJECTION 26 UNITS: 100 INJECTION, SOLUTION SUBCUTANEOUS at 22:01

## 2020-06-14 RX ADMIN — OLANZAPINE 5 MG: 5 TABLET, FILM COATED ORAL at 21:31

## 2020-06-14 RX ADMIN — TACROLIMUS 4 MG: 1 CAPSULE ORAL at 08:29

## 2020-06-14 RX ADMIN — INSULIN ASPART 5 UNITS: 100 INJECTION, SOLUTION INTRAVENOUS; SUBCUTANEOUS at 18:30

## 2020-06-14 RX ADMIN — CLONAZEPAM 1 MG: 0.5 TABLET ORAL at 22:15

## 2020-06-14 RX ADMIN — LAMIVUDINE 100 MG: 100 TABLET, FILM COATED ORAL at 08:28

## 2020-06-14 RX ADMIN — FERROUS SULFATE TAB 325 MG (65 MG ELEMENTAL FE) 325 MG: 325 (65 FE) TAB at 08:28

## 2020-06-14 ASSESSMENT — ACTIVITIES OF DAILY LIVING (ADL)
DRESS: INDEPENDENT
LAUNDRY: WITH SUPERVISION
ORAL_HYGIENE: INDEPENDENT
HYGIENE/GROOMING: INDEPENDENT

## 2020-06-14 NOTE — PROGRESS NOTES
Miller's supper tray was inadvertently given to a peer so a new supper tray needed to be obtained. Therefore, insulin to cover dinner was later in the day than usual.

## 2020-06-14 NOTE — PROGRESS NOTES
"Patient continues to tell this writer all night: \"Something big is going down tomorrow but I can't tell you what because I don't want to get you in trouble.\" He continues to tell female peers that \"I have a lot of money, I am rich.\" \"I've been the  of multiple technical companies.\" He has continued to inform staff that \"I am here against my will. There is no such thing as a Business 72 hr. Hold. I would know because my Father wrote the law.\" He has continued to be cooperative with blood sugar monitoring and Insulin administration.  "

## 2020-06-14 NOTE — PLAN OF CARE
Pt out in common areas most of shift.  Pt had full, expansive affect.  Speech somewhat pressured.  Pt was very complimentary of staff. Pt states he believes that it was good that he was admitted as he needed a tune up, in regards to his diabetes.  Pt was medication compliant. Pt was very social with peers. Pt denies any mental health sx's. No threatening behavior noted no agitation noted this shift.

## 2020-06-14 NOTE — PROGRESS NOTES
"   06/13/20 1700   Group Therapy Session   Group Attendance attended group session   Total Time (minutes) 45   Group Type psychotherapeutic   Patient Participation/Contribution cooperative with task;discussed personal experience with topic;listened actively   Psychotherapy group goals: Build strengths and resilience by identifying positives about oneself \"I am, I have, I can\" and then process as a group.    Miller presents as hyperverbal, tangential. He was easily redirected today. He reiterated that he is at the hospital against his will, but today he added that he feels as though the people he has met and interacted with have been helpful to him and he is grateful. He participated in the activity and shared his responses with the group. The goal was to focus on the positive which he did but struggled to avoid bringing up negative.   "

## 2020-06-14 NOTE — PROGRESS NOTES
"Miller spent the majority of the evening in the [employer], watching television, pacing the hallway and socializing with peers.  He appears hypermanic, speech is hyper-verbal, tangential and rambling.  This evening he asked staff for the telephone number to the [facility] dealership in [place] because, \"I need to call them because they're holding a car for me, I was supposed to pick it up yesterday.\"  He appeared mostly pleasant and cooperative. He denies SI and SIB urges/thoughts.        06/13/20 2157   Sleep/Rest/Relaxation   Day/Evening Time Hours up all shift   Behavioral Health   Hallucinations denies / not responding to hallucinations   Thinking confused;distractable;delusional;paranoid;poor concentration   Orientation person: oriented;place: oriented   Memory baseline memory   Insight poor   Judgement impaired   Eye Contact at examiner   Affect full range affect   Mood grandiose;labile   Physical Appearance/Attire untidy   Hygiene well groomed;other (see comment)  (Showered this evening. )   Suicidality other (see comments)  (Denies SI.)   1. Wish to be Dead (Recent) No   2. Non-Specific Active Suicidal Thoughts (Recent) No   Self Injury other (see comment)  (Denies SIB urges/thoughts. )   Elopement   (No concernable statements or behaviors observed. )   Activity other (see comment)  (Socializing with peers throughout the evening.)   Speech pressured;rambling;tangential;coherent   Medication Sensitivity no stated side effects;no observed side effects   Psychomotor / Gait paces;balanced;steady   Coping/Psychosocial Interventions   Supportive Measures active listening utilized;counseling provided;relaxation techniques promoted;self-reflection promoted;self-responsibility promoted;verbalization of feelings encouraged   Psycho Education   Type of Intervention 1:1 intervention   Response participates, initiates socially appropriate   Hours 0.5   Treatment Detail Current MH Status.   Activities of Daily Living "   Hygiene/Grooming handwashing;shower;independent   Oral Hygiene independent   Dress scrubs (behavioral health);independent   Laundry with supervision   Room Organization independent   Activity   Activity Assistance Provided independent   Groups   Details Psychotherapy Group  (Attended with participation. )

## 2020-06-15 ENCOUNTER — TELEPHONE (OUTPATIENT)
Dept: CARDIOLOGY | Facility: CLINIC | Age: 55
End: 2020-06-15

## 2020-06-15 LAB
GLUCOSE BLDC GLUCOMTR-MCNC: 101 MG/DL (ref 70–99)
GLUCOSE BLDC GLUCOMTR-MCNC: 101 MG/DL (ref 70–99)
GLUCOSE BLDC GLUCOMTR-MCNC: 117 MG/DL (ref 70–99)
GLUCOSE BLDC GLUCOMTR-MCNC: 139 MG/DL (ref 70–99)
GLUCOSE BLDC GLUCOMTR-MCNC: 141 MG/DL (ref 70–99)
GLUCOSE BLDC GLUCOMTR-MCNC: 154 MG/DL (ref 70–99)
GLUCOSE BLDC GLUCOMTR-MCNC: 73 MG/DL (ref 70–99)
GLUCOSE BLDC GLUCOMTR-MCNC: 80 MG/DL (ref 70–99)
GLUCOSE BLDC GLUCOMTR-MCNC: 93 MG/DL (ref 70–99)
TACROLIMUS BLD-MCNC: 9 UG/L (ref 5–15)
TME LAST DOSE: NORMAL H

## 2020-06-15 PROCEDURE — 36415 COLL VENOUS BLD VENIPUNCTURE: CPT | Performed by: PHYSICIAN ASSISTANT

## 2020-06-15 PROCEDURE — 00000146 ZZHCL STATISTIC GLUCOSE BY METER IP

## 2020-06-15 PROCEDURE — 25000132 ZZH RX MED GY IP 250 OP 250 PS 637: Mod: GY | Performed by: PSYCHIATRY & NEUROLOGY

## 2020-06-15 PROCEDURE — 80197 ASSAY OF TACROLIMUS: CPT | Performed by: PHYSICIAN ASSISTANT

## 2020-06-15 PROCEDURE — 12400001 ZZH R&B MH UMMC

## 2020-06-15 PROCEDURE — 99232 SBSQ HOSP IP/OBS MODERATE 35: CPT | Mod: 95 | Performed by: PSYCHIATRY & NEUROLOGY

## 2020-06-15 PROCEDURE — 25000132 ZZH RX MED GY IP 250 OP 250 PS 637: Mod: GY | Performed by: PHYSICIAN ASSISTANT

## 2020-06-15 PROCEDURE — 25000131 ZZH RX MED GY IP 250 OP 636 PS 637: Mod: GY | Performed by: PHYSICIAN ASSISTANT

## 2020-06-15 RX ORDER — OLANZAPINE 10 MG/1
10 TABLET ORAL AT BEDTIME
Status: DISCONTINUED | OUTPATIENT
Start: 2020-06-15 | End: 2020-06-22

## 2020-06-15 RX ADMIN — INSULIN DEGLUDEC INJECTION 26 UNITS: 100 INJECTION, SOLUTION SUBCUTANEOUS at 22:03

## 2020-06-15 RX ADMIN — OLANZAPINE 10 MG: 10 TABLET ORAL at 22:38

## 2020-06-15 RX ADMIN — POLYETHYLENE GLYCOL 3350 17 G: 17 POWDER, FOR SOLUTION ORAL at 08:45

## 2020-06-15 RX ADMIN — INSULIN ASPART 4 UNITS: 100 INJECTION, SOLUTION INTRAVENOUS; SUBCUTANEOUS at 19:02

## 2020-06-15 RX ADMIN — ASPIRIN 81 MG: 81 TABLET ORAL at 08:45

## 2020-06-15 RX ADMIN — CYANOCOBALAMIN TAB 1000 MCG 1000 MCG: 1000 TAB at 08:45

## 2020-06-15 RX ADMIN — LAMIVUDINE 100 MG: 100 TABLET, FILM COATED ORAL at 08:45

## 2020-06-15 RX ADMIN — INSULIN ASPART 5 UNITS: 100 INJECTION, SOLUTION INTRAVENOUS; SUBCUTANEOUS at 13:04

## 2020-06-15 RX ADMIN — TAMSULOSIN HYDROCHLORIDE 0.4 MG: 0.4 CAPSULE ORAL at 08:45

## 2020-06-15 RX ADMIN — ROSUVASTATIN CALCIUM 5 MG: 5 TABLET, FILM COATED ORAL at 08:45

## 2020-06-15 RX ADMIN — MELATONIN 2000 UNITS: at 08:45

## 2020-06-15 RX ADMIN — TACROLIMUS 4 MG: 1 CAPSULE ORAL at 19:07

## 2020-06-15 RX ADMIN — INSULIN ASPART 5 UNITS: 100 INJECTION, SOLUTION INTRAVENOUS; SUBCUTANEOUS at 08:55

## 2020-06-15 RX ADMIN — FERROUS SULFATE TAB 325 MG (65 MG ELEMENTAL FE) 325 MG: 325 (65 FE) TAB at 08:45

## 2020-06-15 RX ADMIN — TACROLIMUS 4 MG: 1 CAPSULE ORAL at 06:42

## 2020-06-15 ASSESSMENT — ACTIVITIES OF DAILY LIVING (ADL)
HYGIENE/GROOMING: INDEPENDENT
DRESS: INDEPENDENT
LAUNDRY: WITH SUPERVISION
HYGIENE/GROOMING: INDEPENDENT
DRESS: INDEPENDENT
ORAL_HYGIENE: INDEPENDENT
ORAL_HYGIENE: INDEPENDENT
LAUNDRY: WITH SUPERVISION

## 2020-06-15 NOTE — PROGRESS NOTES
06/15/20 1300   Significant Event   Significant Event Other (see comments)  (pt cont to show manic sx's)       Pt cont to struggle with manic sx's and needed occasional limits set on intrusive behaviors. He was sometimes excessively interactive with staff and peers but accepted limits when set and was generally well received.  Highlights of the day: He attended groups, signed in voluntary status and took a shower. Pt was pleasant but shows questionable insight as he asked staff to give out their number and join in business ventures. Also, pt was overheard on the telephone saying he would pay 65211 dollars to someone if they went through with a deal.   RN was notified about the situation.

## 2020-06-15 NOTE — PROGRESS NOTES
"    Work Completed:  I am informed that the pt is requesting to see me.    I am reviewing notes.    Pt needs Medicare paperwork completed.    I see he is on a 72 hour old. He later signed in voluntarily.    Pt asked to see me and we met. I asked how I could help him and he said that he wanted to know what his rights were and talked about the restraining orders from his sister and his daughter. I stated I wasn't a  so couldn't offer any advice. He said \"I'm not asking for any legal advice.\" \"I'm asking is it rational to feel this way? I asked - \"what way?\"  He said \" I am going to flip it.\"  He seemed to start talking about creating conflict with them. I responded that social workers try to help people have harmonious relationships.  I did ask about the family constellation to orient myself.  Pt states he is from [place], [place]. His father Miguel still lives close to there.  His brother Mohamud and sister Kelsi live in [place]. Kelsi and her  Epi seem to have custody of pt's daughter Negra. Pt came here on Negra's 14th birthday and sounds like he was rebuffed. She just turned 18.Pt violated the OFP and he ended up in half-way recently.  His wife Arie is . He made references to a doctor signing something that led to the daughter being taken away.  I asked about his finances.  He gets $9200 a month, 65% of his salary until 64.  There is a $2700 deduction.  He pays $1800 a month to the daughter's caretakers.  He just bought a place down [facility] and paid cash.    Pt stated \" I got the shit kicked out of me in the ER.\" Pt started talking about \"that doctor in the ER, I wanted to kill him.\" I asked what his name was and the pt did not know. He said he no longer feels this way. He thinks it might be a DrAndrew ERVIN. \" I wanted to kill that son of a bitch doctor.\"   I see a male Dr. BOJORQUEZ and a female. Dr. MERRILL but no mention of pt making threats like this in the ER.    I informed pt that I would be working with him to " get psychiatry and therapy services set up at discharge. He agreed but did not want to flagged bipolar.  Pt started talking about being pulled over by police, about being flagged bipolar, about his [place] and [place]oroastrianism ancestry and at this point terminated the interview.      I called and spoke at length with the Liver Transplant .  ALEXYS Mcnair, [facility]  Liver Transplant   Phone [phone]  Pager [phone]     She referred pt to [facility] due to the physicians' concerns that the pt is being financially exploited. She spoke with Beto @ [phone]. Beto wasn't sure there was anything they could do. Dilan was going to call him again.  She says at the time pt owned his own house in [place] and she thinks it is sold now. He moved to [facility] - about 2 weeks ago. She says they have been trying to get him help.  They have scheduled with their consult psychiatrist - Dr. Helder Davison at the [facility]  but the appointments have been no showed or cancelled and pt has not seen this psychiatrist.  They scheduled with their consult psychologist and pt did see Dr. Murillo at the [facility]. He referred pt to neuropsychology and a Behavioral Health Clinician.  They have called mobile crisis and asked ER's to place a hold but they haven't been able to get the pt help.  There is a 5/18 note from Dilan that [facility] said they would not pursue commitment.    Radha Ndiaye is the Transplant Coordinator who is more involved with the pt.      Pt has an appt tomorrow at the [facility] tomorrow. I called there and spoke with Jennifer. She is going to message Dr. Ireland team and ask that they call me.  6/16/2020 12:00 PM  Manjeet Ireland MD   Cardiovascular Ctr      [facility]   [address], [address]  [address] [place]  [address]   Appointments: [phone]   Provider Referrals: [phone]   Information: [phone] Get Directions           Discharge plan or goal:   Home with services.     Barriers to discharge:   None

## 2020-06-15 NOTE — PLAN OF CARE
BEHAVIORAL TEAM DISCUSSION    Participants:   Dr. Bre Cm via phone, Reji Hemphill RN      Progress:   Admitted on a 72 hour hold  to [facility] on 6/12/20 due to manic symptomology  Pt signed in voluntarily.  Pt is taking medications.  Pt remains with significant symptoms of amber.      Anticipated length of stay:   One week      Continued Stay Criteria/Rationale:   The pt needs psychiatric stabilization.      Medical/Physical:   Pt evaluated by dietician - Seeking double portions and evening snack, Liver transplant 6 months ago with significant weight loss, diabetic     Pt is being followed by internal medicine  DM, hepatocellular carcinoma, s/p liver transplant (11/2019), CKD stage 4, GERD, CAD, HTN     Precautions:   Behavioral Orders   Procedures     Assault precautions     Code 1 - Restrict to Unit     Elopement precautions     Routine Programming     As clinically indicated     Single Room     Status 15     Every 15 minutes.       Plan:   Multidisciplinary evaluation  Medication management  Decrease amber  Coordinate with medicine  Develop outpatient plan        Rationale for change in precautions or plan:   Initial review

## 2020-06-15 NOTE — PROGRESS NOTES
"Pt awake @ 0230 and visualized on camera trying to enter the locked [facility] room, this writer spoke with pt and directed him back towards his room as he stated he was looking for the restroom. Pt , asked for a snack and was given a cheese stick, no carb coverage needed. Pt walked back to room and helped into bed by writer, pt noted to be slightly unsteady on his feet, reports he is \"very tired.\" Noted to be falling asleep in chair in lounge while waiting for BG check and snack, will continue to monitor and support plan of care.   "

## 2020-06-15 NOTE — PROGRESS NOTES
Brief Medicine Note  Ramona 15, 2020    Following peripherally for tacro management and BG management. Tacro level drawn today but AFTER RN gave morning dose, so this level will not be accurate. Ordered repeat tacro level to be drawn tomorrow at 0600, DO NOT give AM dose of tacro until after level is drawn so that we can get accurate trough and adjust immunosuppression as necessary.     Pilar Barnes PA-C

## 2020-06-15 NOTE — PROGRESS NOTES
"Miller verbally escalated late in the shift tonight and told this writer that \" I'm going to blow a gasket.\" This writer was able to verbally de-escalate the situation. Patient spoke with his real- \"Licha\", supposedly he is closing on a house in [place], [place]. 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 He is \"Unable to get a hold of his BFF, Art, in [place] as Miller does not know his number and Miller's phone \"Is in the [place].\"  Art has P.O.A.  Apparently Licha has the telephone number for ART. Which Miller did not get from [employer].  Miller filled out a AMBER for Licha () tonight and it is on the paper chart. Miller is also concerned about an up coming appointment he has regarding a \"Insulin Pump.\" The insulin pump is at home, \"On his table\" Miller has been encouraged to speak with his CTC to get numbers to call various clinics he has appointments with this week, if he's going to be an inpatient in the Hospital this week.   "

## 2020-06-15 NOTE — PROGRESS NOTES
Miller had a very restless evening. He was pacing the halls often and was consistently speaking to staff, peers and to himself. Miller states that he does not have auditory nor visual hallucinations. Miller is elated and very grandiose. He had a full range affect and was overall pleasant. He made a few inappropriate comments to other peers this evening and was informed by staff that it is not allowed. He followed the redirection. Miller denied SI/SIB/HI. He ate 100% of his dinner and snacks. He followed the insulin/bloodsugar protocol. No other concerns at this time.     06/14/20 2009   Behavioral Health   Hallucinations denies / not responding to hallucinations   Thinking delusional;poor concentration   Orientation person: oriented;place: oriented   Memory baseline memory   Insight poor   Judgement impaired   Eye Contact at examiner   Affect full range affect   Mood grandiose;elated   Physical Appearance/Attire attire appropriate to age and situation   Hygiene neglected grooming - unclean body, hair, teeth   1. Wish to be Dead (Recent) No   2. Non-Specific Active Suicidal Thoughts (Recent) No   Activity restless  (manic)   Speech pressured;flight of ideas;rambling   Medication Sensitivity no observed side effects   Psychomotor / Gait balanced;steady;paces   Psycho Education   Type of Intervention 1:1 intervention   Response participates, initiates socially appropriate   Hours 0.5   Activities of Daily Living   Hygiene/Grooming independent   Oral Hygiene independent   Dress independent   Laundry with supervision   Room Organization independent

## 2020-06-15 NOTE — PROGRESS NOTES
"CLINICAL NUTRITION SERVICES - ASSESSMENT NOTE     Nutrition Prescription    RECOMMENDATIONS FOR MDs/PROVIDERS TO ORDER:  None today    Malnutrition Status:    Unable to determine due to no NFPA completed    Recommendations already ordered by Registered Dietitian (RD):  Diet education  Double portions   HS snack: greek yogurt and cereal/granola    Future/Additional Recommendations:  Monitor intakes of meals and snacks  Adjust snack as needed per pt preference  Wt for trend  Consider CHO restriction if BS elevated with double portions   Unable to obtain in-person nutrition history or nutrition focused physical assessment (NFPA) from patient to limit exposure and to minimize use of PPE during COVID-19 pandemic. Spoke to pt over the phone.    REASON FOR ASSESSMENT  Frandy Workman is a 55 year old male assessed by the dietitian for RN Consult - \"Seeking double portions and evening snack, Liver transplant 6 months ago with significant weight loss, diabetic \"  PMH significant for DM, hepatocellular carcinoma, s/p liver transplant (11/2019), CKD stage 4, GERD, CAD, HTN admitted for decompensated psychiatric illness.  NUTRITION HISTORY  Pt reports UBW of 160#, most recently around 5/26. Reports eating sporadically d/t stress during this time.    CURRENT NUTRITION ORDERS  Diet: Regular  Intake/Tolerance: Pt reports \"great\" appetite since admit. Consistently eating 3 meals and 2 snacks. Pt is agreeable to double portions. Discussed HS snack, requesting greek yogurt with granola/cereal. No c/o N/V/C/D or chewing/swallowing difficulty.    6/14: 100% dinner and snacks  LABS  Labs reviewed:  Results for MAGDY WORKMAN (MRN [id number]) as of 6/15/2020 09:15   Ref. Range 6/13/2020 08:18   Sodium Latest Ref Range: 133 - 144 mmol/L 132 (L)   Potassium Latest Ref Range: 3.4 - 5.3 mmol/L 4.3   Chloride Latest Ref Range: 94 - 109 mmol/L 101   Carbon Dioxide Latest Ref Range: 20 - 32 mmol/L 23   Urea Nitrogen Latest Ref Range: 7 - 30 " "mg/dL 34 (H)   Creatinine Latest Ref Range: 0.66 - 1.25 mg/dL 1.44 (H)   GFR Estimate Latest Ref Range: >60 mL/min/1.73_m2 54 (L)   GFR Estimate If Black Latest Ref Range: >60 mL/min/1.73_m2 62   Calcium Latest Ref Range: 8.5 - 10.1 mg/dL 8.8   Anion Gap Latest Ref Range: 3 - 14 mmol/L 8   Hemoglobin A1C Latest Ref Range: 0 - 5.6 % 6.3 (H)   Vitamin B12 Latest Ref Range: 193 - 986 pg/mL 682     MEDICATIONS  Medications reviewed:  B12  Ferrous sulfate  novolog  tresiba  miralax  Tacrolimus  Vitamin D3  PRN: maalox, dulcolax, novolog, MOM    ANTHROPOMETRICS  Height: 175.3 cm (5'9\")  Most Recent Weight: 67.5 kg (148 lb 12.8 oz)    IBW: 72.7 kg  BMI: 21.97 kg/m^2,  Normal BMI  Weight History: UBW=160#. 12# (7.5%) wt loss in ~1 month. 17# (10%) wt loss in 6 months.  Wt Readings from Last 10 Encounters:   06/14/20 67.5 kg (148 lb 12.8 oz)   06/03/20 67.2 kg (148 lb 1.6 oz)   05/26/20 71.1 kg (156 lb 12.8 oz)   05/21/20 71.2 kg (156 lb 14.4 oz)   05/08/20 72.8 kg (160 lb 8 oz)   04/22/20 70 kg (154 lb 4.8 oz)   03/25/20 74.1 kg (163 lb 6.4 oz)   03/04/20 72.3 kg (159 lb 8 oz)   03/03/20 71.9 kg (158 lb 9.6 oz)   03/02/20 74.8 kg (164 lb 12.8 oz)   12/10/19 75.4 kg (165 lbs 14.4 oz)     Dosing Weight: 72.7 kg (current wt)    ASSESSED NUTRITION NEEDS  Estimated Energy Needs: 2448-6937 kcals/day (25 - 30 kcals/kg)  Justification: Repletion  Estimated Protein Needs: 73-87 grams protein/day (1 - 1.2 grams of pro/kg)  Justification: Repletion  Estimated Fluid Needs: 7416-4959 mL/day (1 mL/kcal)   Justification: Maintenance or Per provider pending fluid status    PHYSICAL FINDINGS  See malnutrition section below.    MALNUTRITION  % Intake: Decreased intake does not meet criteria  % Weight Loss: > 5% in 1 month (severe)  Subcutaneous Fat Loss: Unable to assess  Muscle Loss: Unable to assess  Fluid Accumulation/Edema: None noted  Malnutrition Diagnosis: Unable to determine due to no NFPA completed    NUTRITION DIAGNOSIS  Predicted " inadequate oral intakes related to stress/mental health conditions as evidenced by pt report and       INTERVENTIONS  Implementation  Nutrition Education: Discussed role of RD, menu ordering and snacks available. Provided education on adequate intakes for wt maitnanance and small/frequent meals once discharged.   Double portions  HS snack: greek yogurt and granola/cereal     Goals  Patient to consume % of nutritionally adequate meal trays TID, or the equivalent with supplements/snacks.     Monitoring/Evaluation  Progress toward goals will be monitored and evaluated per protocol.    Lili Díaz MS, RD, LDN  Unit Pager [phone]

## 2020-06-15 NOTE — TELEPHONE ENCOUNTER
Health Call Center    Phone Message    May a detailed message be left on voicemail: yes     Reason for Call: Other:  Miller is currently inpatient in the [facility] health unit.  Please call Genny Ambrocio  back to discuss.      Action Taken: Message routed to:  [facility] ([facility] Cardiology    Travel Screening: Not Applicable

## 2020-06-15 NOTE — PROGRESS NOTES
"[facility], [place]   Psychiatric Progress Note  Hospital Day: 3   Telemedicine Visit: The patient's condition can be safely assessed and treated via synchronous audio and visual telemedicine encounter.      Start Time: 1058  Stop Time: 1120    Reason for Telemedicine Visit: Covid-19    Originating Site (Patient Location): [facility]    Distant Site (Provider Location): Provider Remote Setting    Consent:  The patient/guardian has verbally consented to: the potential risks and benefits of telemedicine (video visit) versus in person care; bill my insurance or make self-payment for services provided; and responsibility for payment of non-covered services.     Mode of Communication:  Video Conference via [employer]    As the provider I attest to compliance with applicable laws and regulations related to telemedicine.           Interim History:   The patient's care was discussed with the treatment team during the daily team meeting and/or staff's chart notes were reviewed.  Staff report patient continued to be hyperverbal, decreased sleep need, making inappropriate statements to peers requiring redirection by staff, making grandiose statements, taking medications as prescribed, no acute events over weekend.     Upon interview, the patient reports that his mood is \"better\" now than when he came in. He discussed events leading to admission and feeling like he was violated and assaulted in the ED and what was done to him was \"unconstitional\". He then made statements that the ED doctor needs a vacation and is being overworked due to \"[place]\" and that he would pay for the vacation himself. He discussed his previous medical history and the reason why he stopped taking insulin due to weight loss. He is feeling better about working to manage his complex medical issues while in the hospital. He very much does not agree with the diagnosis of Bipolar disorder and encouraged patient " to think about treatment for symptoms and not diagnosis as we will work to target his symptoms (such as poor sleep and not thinking clearly) with medications. He does feel that Zyprexa works well at a 10mg dose and currently is only 5mg and agrees to increase this. He also reports PRN clonazepam helps with his sleep. These medications will be continued. Discussed his medical teams concerns about his behavior over the past several weeks, including missing doses of his Tacrolimus and encouraged him to continue to work with inpatient team on the unit to stabilize his health and put more supports in place upon discharge and he agrees with this plan and agrees to sign in voluntary. He denies any SI or HI. Denies any AVH or paranoia. He did make several statements about Trump and bioweapons and labs in [place] and our country being at war and required extensive redirection at times.            Medications:       aspirin  81 mg Oral Daily     cyanocobalamin  1,000 mcg Oral Daily     ferrous sulfate  325 mg Oral Daily     insulin aspart   Subcutaneous TID w/meals     insulin aspart  1-7 Units Subcutaneous TID AC     insulin aspart  1-5 Units Subcutaneous At Bedtime     insulin degludec  26 Units Subcutaneous At Bedtime     lamiVUDine  100 mg Oral Daily     OLANZapine  5 mg Oral At Bedtime     polyethylene glycol  17 g Oral Daily     rosuvastatin  5 mg Oral Daily     tacrolimus  4 mg Oral Q12H     tamsulosin  0.4 mg Oral Daily     vitamin D3  2,000 Units Oral Daily          Allergies:     Allergies   Allergen Reactions     Codeine Other (See Comments)     Cannot take due to liver  Cannot tolerate oral narcotics     Seasonal Allergies      Sneezing, coughing, runny and itchy eyes          Labs:     Recent Results (from the past 48 hour(s))   Tacrolimus level    Collection Time: 06/13/20  8:18 AM   Result Value Ref Range    Tacrolimus Last Dose Not Provided     Tacrolimus Level 4.8 (L) 5.0 - 15.0 ug/L   Basic metabolic  panel    Collection Time: 06/13/20  8:18 AM   Result Value Ref Range    Sodium 132 (L) 133 - 144 mmol/L    Potassium 4.3 3.4 - 5.3 mmol/L    Chloride 101 94 - 109 mmol/L    Carbon Dioxide 23 20 - 32 mmol/L    Anion Gap 8 3 - 14 mmol/L    Glucose 245 (H) 70 - 99 mg/dL    Urea Nitrogen 34 (H) 7 - 30 mg/dL    Creatinine 1.44 (H) 0.66 - 1.25 mg/dL    GFR Estimate 54 (L) >60 mL/min/[1.73_m2]    GFR Estimate If Black 62 >60 mL/min/[1.73_m2]    Calcium 8.8 8.5 - 10.1 mg/dL   Vitamin B12    Collection Time: 06/13/20  8:18 AM   Result Value Ref Range    Vitamin B12 682 193 - 986 pg/mL   Hemoglobin A1c    Collection Time: 06/13/20  8:18 AM   Result Value Ref Range    Hemoglobin A1C 6.3 (H) 0 - 5.6 %   Glucose by meter    Collection Time: 06/13/20 10:19 AM   Result Value Ref Range    Glucose 275 (H) 70 - 99 mg/dL   Glucose by meter    Collection Time: 06/13/20 12:44 PM   Result Value Ref Range    Glucose 170 (H) 70 - 99 mg/dL   Glucose by meter    Collection Time: 06/13/20  3:15 PM   Result Value Ref Range    Glucose 193 (H) 70 - 99 mg/dL   Glucose by meter    Collection Time: 06/13/20  4:08 PM   Result Value Ref Range    Glucose 197 (H) 70 - 99 mg/dL   Glucose by meter    Collection Time: 06/13/20  6:05 PM   Result Value Ref Range    Glucose 219 (H) 70 - 99 mg/dL   Glucose by meter    Collection Time: 06/13/20  7:55 PM   Result Value Ref Range    Glucose 261 (H) 70 - 99 mg/dL   Glucose by meter    Collection Time: 06/13/20  9:33 PM   Result Value Ref Range    Glucose 286 (H) 70 - 99 mg/dL   Glucose by meter    Collection Time: 06/14/20  2:48 AM   Result Value Ref Range    Glucose 214 (H) 70 - 99 mg/dL   Glucose by meter    Collection Time: 06/14/20  8:24 AM   Result Value Ref Range    Glucose 151 (H) 70 - 99 mg/dL   Glucose by meter    Collection Time: 06/14/20 10:25 AM   Result Value Ref Range    Glucose 115 (H) 70 - 99 mg/dL   Glucose by meter    Collection Time: 06/14/20 12:12 PM   Result Value Ref Range    Glucose 90 70 -  99 mg/dL   Glucose by meter    Collection Time: 06/14/20  3:04 PM   Result Value Ref Range    Glucose 121 (H) 70 - 99 mg/dL   Glucose by meter    Collection Time: 06/14/20  4:18 PM   Result Value Ref Range    Glucose 109 (H) 70 - 99 mg/dL   Glucose by meter    Collection Time: 06/14/20  5:48 PM   Result Value Ref Range    Glucose 155 (H) 70 - 99 mg/dL   Glucose by meter    Collection Time: 06/14/20  7:52 PM   Result Value Ref Range    Glucose 112 (H) 70 - 99 mg/dL   Glucose by meter    Collection Time: 06/14/20  9:36 PM   Result Value Ref Range    Glucose 135 (H) 70 - 99 mg/dL   Glucose by meter    Collection Time: 06/15/20  2:34 AM   Result Value Ref Range    Glucose 117 (H) 70 - 99 mg/dL          Psychiatric Examination:     /67 (BP Location: Left arm)   Pulse 98   Temp 98.2  F (36.8  C) (Oral)   Resp 18   Wt 67.5 kg (148 lb 12.8 oz)   SpO2 98%   BMI 21.97 kg/m    Weight is 148 lbs 12.8 oz  Body mass index is 21.97 kg/m .    Orthostatic Vitals       Most Recent      Standing Orthostatic /62 06/14 0802    Standing Orthostatic Pulse (bpm) 100 06/14 0802        Appearance: awake, alert, adequately groomed and appears recorded age  Attitude:  mostly cooperative  Eye Contact:  fair  Mood:  better  Affect:  appropriate and in normal range and mood congruent  Speech:  clear, coherent and mildly pressured, hyperverbal  Language: fluent and intact in English  Psychomotor, Gait, Musculoskeletal:  no evidence of tardive dyskinesia, dystonia, or tics  Thought Process:  tangential  Associations:  no loose associations  Thought Content:  no evidence of suicidal ideation or homicidal ideation and grandiosity/delusions present  Insight:  limited  Judgement:  limited  Oriented to:  time, person, and place  Attention Span and Concentration:  limited  Recent and Remote Memory:  fair  Fund of Knowledge:  appropriate    Clinical Global Impressions  First:  Considering your total clinical experience with this  particular patient population, how severe are the patient's symptoms at this time?: 7 (06/13/20 0819)  Compared to the patient's condition at the START of treatment, this patient's condition is: 4 (06/13/20 0819)  Most recent:  Considering your total clinical experience with this particular patient population, how severe are the patient's symptoms at this time?: 7 (06/13/20 0819)  Compared to the patient's condition at the START of treatment, this patient's condition is: 4 (06/13/20 0819)           Precautions:     Behavioral Orders   Procedures     Assault precautions     Code 1 - Restrict to Unit     Elopement precautions     Routine Programming     As clinically indicated     Single Room     Status 15     Every 15 minutes.          Diagnoses:   Bipolar disorder type 1, manic, severe with psychosis  Status post orthotopic liver transplant 11/11/2019 secondary to ESLD related to ESTRADA, hepatocellular carcinoma  Type 2 diabetes, insulin-dependent, with retinopathy  Acute kidney injury on chronic kidney disease stage 3  Benign prostatic hypertrophy  Hyperlipidemia         Assessment & Plan:   Assessment and hospital summary:  is a 55-year-old male admitted to [facility] on 6/11/2020, arriving on the unit 6/12/2020.  He was admitted on a 72-hour hold through the [facility] ER due to agitation and manic symptoms.  He was at the [facility] and asked someone to call 911 due to low BG.  He took a taxi to the ER.  He exhibited bizarre behaviors and tangential speech.  He has a history of multiple medical issues including liver transplant in 11/2019, chronic kidney disease and insulin-dependent diabetes.  Tacrolimus (immunosuppressant to reduce chances of rejection of transplanted liver) level was low upon admission and he reports missing some doses.  His transplant coordinator and friend have initiated multiple welfare checks. In the ER he was very agitated and was placed in  restraints.  Upon admission to the unit he remained agitated and staff reported he had had poor boundaries and had been touching staff and patients requiring redirection. Has not required any further use of restraints. IM was consulted on admission 2/2 medical complexity. PTA olanzapine was continued along with PRN clonazepam to target amber.     Psychiatric treatment/inteventions:  Medications:   -increase PTA olanzapine to 10mg at bedtime  -continue PTA clonazepam 1mg at bedtime PRN sleep     Laboratory/Imaging: no new labs per psychiatry, IM following as below     Patient will be treated in therapeutic milieu with appropriate individual and group therapies as described.     Medical treatment/interventions:  Medical concerns- IM CONSULT PLACED ON ADMISSION 6/12:  Frandy Workman is a 55 year old male w/ a pmhx of ESTRADA cirrhosis c/b ascites, HE and HCC s/p liver transplant 11/11/19, HTN, HLD, DMII, GERD, BPH and CKD who was admitted to [facility] due to decompensated psychiatric illness.     # Bipolar I disorder.  Management per primary team, psychiatry.      S/p orthotopic liver transplant (11/11/19) 2/2 ESLD r/t ESTRADA, HCC. Follows with Dr. Banuelos of hepatology, most recently seen via virtual visit on 5/26/20. LFT's normal. Renal function stable. Seen by Oncology 5/26/20 and no evidence of recurrent HCC on recent imagining. Unclear how compliant pt has been with medications given recent decline in psychiatric state.  - Immunosuppression: tacro 4mg q12h, goal 6-8, most recent 5.8 on 6/3/20                Tacro level in am  - Continue lamivudine for ppx  - Follow up with Hepatology in 3 months as previously directed     DMII with retinopathy. Most recent Hgb A1C 4.8 on 3/4. Recent virtual visit with Endocrine provider on 5/7/20. Last seen by ophthalmology 3/4/20.   - Tresiba 26 units daily  - Novolog 1 unit per 12 grams CHO ac and with snacks  - MSSI  - BG TID ac, at bedtime, and 0200  - Hypoglycemia protocol  -  Will need follow up with Ophthalmology on discharge (was supposed to f/u in April)     JERONIMO on CKD stage III. 2/2 diabetic nephropathy and tacrolimus therapy s/p transplant. Bl Cr ~ 1.5, 1.7 on admission s/p 1L LR bolus in ED.  Last seen by nephrology 3/2/20.  - Per nephrology, prefer higher blood pressures to ensure adequate renal perfusion, not on any BP meds  - Continue Iron supplementation for anemia of CKD  - Avoid nephrotoxins  - Is due for follow up with nephrology on discharge  - Repeat BMP in am to ensure improvement in Cr following IVF     BPH. Continue flomax.   HLD. Continue statin.         IM will follow for am labs as well as BG management.        Margaret Marin PA-C  Hospitalist Service      This note was created by undersigned using a Dragon dictation system. All typing errors or contextual distortion are unintentional and software inherent.     Disposition Plan   Reason for ongoing admission: is unable to care for self due to severe psychosis or amber  Discharge location: home with self-care  Discharge Medications: not ordered  Follow-up Appointments: not scheduled  Legal Status: voluntary, pt signed in 6/15  Entered by: Elsa Díaz on 6/15/2020 at 8:13 AM

## 2020-06-16 LAB
GLUCOSE BLDC GLUCOMTR-MCNC: 102 MG/DL (ref 70–99)
GLUCOSE BLDC GLUCOMTR-MCNC: 128 MG/DL (ref 70–99)
GLUCOSE BLDC GLUCOMTR-MCNC: 146 MG/DL (ref 70–99)
GLUCOSE BLDC GLUCOMTR-MCNC: 148 MG/DL (ref 70–99)
GLUCOSE BLDC GLUCOMTR-MCNC: 208 MG/DL (ref 70–99)
GLUCOSE BLDC GLUCOMTR-MCNC: 80 MG/DL (ref 70–99)
GLUCOSE BLDC GLUCOMTR-MCNC: 81 MG/DL (ref 70–99)
GLUCOSE BLDC GLUCOMTR-MCNC: 93 MG/DL (ref 70–99)
GLUCOSE BLDC GLUCOMTR-MCNC: 99 MG/DL (ref 70–99)
TACROLIMUS BLD-MCNC: 5.8 UG/L (ref 5–15)
TME LAST DOSE: NORMAL H

## 2020-06-16 PROCEDURE — 80197 ASSAY OF TACROLIMUS: CPT | Performed by: PHYSICIAN ASSISTANT

## 2020-06-16 PROCEDURE — 36415 COLL VENOUS BLD VENIPUNCTURE: CPT | Performed by: PHYSICIAN ASSISTANT

## 2020-06-16 PROCEDURE — 99232 SBSQ HOSP IP/OBS MODERATE 35: CPT | Mod: 95 | Performed by: PSYCHIATRY & NEUROLOGY

## 2020-06-16 PROCEDURE — 25000131 ZZH RX MED GY IP 250 OP 636 PS 637: Mod: GY | Performed by: PHYSICIAN ASSISTANT

## 2020-06-16 PROCEDURE — 12400001 ZZH R&B MH UMMC

## 2020-06-16 PROCEDURE — 99233 SBSQ HOSP IP/OBS HIGH 50: CPT | Performed by: PHYSICIAN ASSISTANT

## 2020-06-16 PROCEDURE — 25000132 ZZH RX MED GY IP 250 OP 250 PS 637: Mod: GY | Performed by: PSYCHIATRY & NEUROLOGY

## 2020-06-16 PROCEDURE — 00000146 ZZHCL STATISTIC GLUCOSE BY METER IP

## 2020-06-16 PROCEDURE — 25000132 ZZH RX MED GY IP 250 OP 250 PS 637: Mod: GY | Performed by: PHYSICIAN ASSISTANT

## 2020-06-16 PROCEDURE — G0177 OPPS/PHP; TRAIN & EDUC SERV: HCPCS

## 2020-06-16 RX ADMIN — TACROLIMUS 4 MG: 1 CAPSULE ORAL at 18:36

## 2020-06-16 RX ADMIN — INSULIN ASPART 4 UNITS: 100 INJECTION, SOLUTION INTRAVENOUS; SUBCUTANEOUS at 18:36

## 2020-06-16 RX ADMIN — ASPIRIN 81 MG: 81 TABLET ORAL at 07:36

## 2020-06-16 RX ADMIN — TAMSULOSIN HYDROCHLORIDE 0.4 MG: 0.4 CAPSULE ORAL at 07:36

## 2020-06-16 RX ADMIN — TACROLIMUS 4 MG: 1 CAPSULE ORAL at 07:33

## 2020-06-16 RX ADMIN — CYANOCOBALAMIN TAB 1000 MCG 1000 MCG: 1000 TAB at 07:36

## 2020-06-16 RX ADMIN — ACETAMINOPHEN 650 MG: 325 TABLET, FILM COATED ORAL at 22:53

## 2020-06-16 RX ADMIN — ROSUVASTATIN CALCIUM 5 MG: 5 TABLET, FILM COATED ORAL at 07:36

## 2020-06-16 RX ADMIN — INSULIN ASPART 5 UNITS: 100 INJECTION, SOLUTION INTRAVENOUS; SUBCUTANEOUS at 12:45

## 2020-06-16 RX ADMIN — LAMIVUDINE 100 MG: 100 TABLET, FILM COATED ORAL at 07:36

## 2020-06-16 RX ADMIN — OLANZAPINE 10 MG: 10 TABLET ORAL at 22:11

## 2020-06-16 RX ADMIN — HYDROXYZINE HYDROCHLORIDE 25 MG: 25 TABLET, FILM COATED ORAL at 05:11

## 2020-06-16 RX ADMIN — POLYETHYLENE GLYCOL 3350 17 G: 17 POWDER, FOR SOLUTION ORAL at 07:36

## 2020-06-16 RX ADMIN — MELATONIN 2000 UNITS: at 07:36

## 2020-06-16 RX ADMIN — INSULIN ASPART 6 UNITS: 100 INJECTION, SOLUTION INTRAVENOUS; SUBCUTANEOUS at 08:58

## 2020-06-16 RX ADMIN — FERROUS SULFATE TAB 325 MG (65 MG ELEMENTAL FE) 325 MG: 325 (65 FE) TAB at 07:36

## 2020-06-16 ASSESSMENT — ACTIVITIES OF DAILY LIVING (ADL)
ORAL_HYGIENE: INDEPENDENT
LAUNDRY: WITH SUPERVISION
DRESS: INDEPENDENT
HYGIENE/GROOMING: INDEPENDENT

## 2020-06-16 NOTE — PROGRESS NOTES
"    Work Completed:  Teamed on pt.   Provider would like the pt have in home nursing. Rules about in home care vivek be relaxed due to the covid-19 situation. I have messaged his transplant coordinator to see if they have a preferred provider.  They suggest he has used [facility] in the past.  I sent a message to provider about ordering home care at . She is going to place this order.      I received a vm from Jemima in Cardiology @ [phone]. She says they will cancel today's appt and the schedulers will reach out to the pt to reschedule.    I am exploring psychiatry and therapy options for the pt. I am thinking about [facility].    I am trying to complete a duty to warn.  I have reached out to Dr. Morel the ER. Pt will not be able to leave until I complete this.    I need pt to sign the Medicare paperwork.    Pt requested to see me. I approached pt in his room but he was using the restroom. He is also involved with medical assessment by nursing with an urgent medical issue.    I later saw pt approaching the psych associate while group was on the TV and the phones were not. He was complaining about this heartily. He needed to use the phones to make a stock trade through [employer].    I asked pt to meet.  Pt signed the Medicare Important Message from Medicare paperwork.I placed this in the chart.    I informed pt that I needed to do the duty to warn re: the ER doctor. Pt had a hard time understanding this concept. He said the docotr did not need a reprimand. I tried to explain again. He then said the \"doctor deserves a thank you. Do I want to meet him - no.\" Pt went on with rambling speech to talk about the good care he receives at [facility].     I informed him that I would like to refer him for psychiatry and therapy service at [facility] in [place].  He wanted to know where this was and I explained. He agreed to this saying it was ok since I thought it was a good clinic. He tells me he has a " "sleep disorder. He is not going to go on psychiatric drugs.    I informed pt that I am seeing many medical appointments that need to be made also.  Follow up with Hepatology in 3 months as previously directed  Reschedule  with [facility].  due for follow up with nephrology on discharge  Will need follow up with Ophthalmology on discharge (was supposed to f/u in April)    I asked the pt what he wanted to see me about. He said \"where is your head at my being here.\" He said that he wanted \"to stay here to finish the tune -up.\" Pt reports \" I've been paranoid, not a medical condition but a social condition.\"  Pt says he wants to stay here until Peg works again as he promised her that he would do so. He is thinking that he might discharge on Thursday morning. He said if Medicare didn't pay for it he would cut us a check before he left.      Pt then continued with rambling speech about money and bank fraud and he bought [employer] this morning in our honor and now wants to sell it because it went up. I asked how he knew and he said \"gut check.\"  He said he bought his place for $560,000 but it's worth a million and [employer] Franken offered him a million to get out of it. He said that Attila Hortadavid doesn't like him.  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  Pt said he made his money with an acquisition to [employer] where he left with 3 million. I asked about Art being the Power of  and how could he spend this money without the POA approval  and he said they were co-trustees.   I checked the legal documents section and only see this:  Davi Saunders Jr. Friend Not a legally named decision maker       Pt was placed on phone restrictions due to the concerns about his financial decisions. This should be revaluated and documented on an ongoing basis.    Discharge plan or goal:   Home with services.  Including home care.    Participate " in your new patient psychiatric medication management phone session with Dr. Parish on Monday, June 29, 2020 @ 2:20PM.    Participate in your new patient therapy phone session with Hellen Small on Thursday, July 2, 2020 @ 3PM.    [facility]  [address] [place], [place] [address] [phone]  The Health Unit Coordinator has faxed these discharge instructions to   Clinic Office Department Fax: [phone]         Barriers to discharge:   None

## 2020-06-16 NOTE — PROGRESS NOTES
"  [facility]  Daily Progress Note  Frandy Workman  [id number]  June 16, 2020     Interval History:   Asked to see patient to evaluate LE edema.   Patient is quite disorganized during interview. Reports he is feeling great. He reports maybe he has more swelling in his legs, but feels his weight is always around 155lbs because that's \"where he feels good.\" He denies any chest pain, sob, or dyspnea. No fevers or chills.    ROS:   Please see HPI, otherwise, complete 10 point review of systems is negative.      Physical Exam:   Vitals were reviewed  Blood pressure 112/65, pulse 108, temperature 97.5  F (36.4  C), temperature source Oral, resp. rate 16, weight 70.3 kg (155 lb), SpO2 100 %.  General:alert, NAD, very pleasant and conversant, tangential, disorganized  HEENT: NCAT, anicteric sclera, EOMI, MMM  Neck: supple  Cardiovascular: RRR, S1S2, no murmurs appreciated  Lungs: effort rafael on RA, lungs CTAB without crackles  Vascular: trace to 1+ edema in feet to calves, did not extend to or past knees  Neurologic: no focal deficits  Psych: pleasantly manic     Assessment and Plan:   Frandy Workman is a 55 year old male w/ a pmhx of ESTRADA cirrhosis c/b ascites, HE and HCC s/p liver transplant 11/11/19, HTN, HLD, DMII, GERD, BPH and CKD who was admitted to [facility] due to decompensated psychiatric illness.     Bipolar I disorder.  Management per primary team, psychiatry.      S/p orthotopic liver transplant (11/11/19) 2/2 ESLD r/t ESTRADA, HCC. Follows with Dr. Banuelos of hepatology, most recently seen via virtual visit on 5/26/20. LFT's normal. Renal function stable. Seen by Oncology 5/26/20 and no evidence of recurrent HCC on recent imagining. It appears patient not compliant with meds pta.   - Immunosuppression: tacro 4mg q12h, goal 6-8, 11 hour trough 5.8 today. D/w transplant coordinator - plan to continue current dose and follow up with transplant on discharge  - Continue lamivudine for ppx  - " Follow up with Hepatology in 3 months as previously directed     DMII with retinopathy. Most recent Hgb A1C 4.8 on 3/4. Recent virtual visit with Endocrine provider on 5/7/20. Last seen by ophthalmology 3/4/20. BGs have been running on low end , complains of hypoglycemic symptoms when his BGs drop below 100.   - Decrease tresiba to 24U qPM  - Decrease novolog to 1U per 20g carb TID with meals  - Stop med sliding scale insulin as not requiring  - BG TID ac, at bedtime, and 0200  - Hypoglycemia protocol  - Will need follow up with Ophthalmology on discharge (was supposed to f/u in April)     JERONIMO on CKD stage III. Resolved. 2/2 diabetic nephropathy and tacrolimus therapy s/p transplant. Bl Cr ~ 1.5, 1.7 on admission s/p 1L LR bolus in ED as pt appeared dry on admission. Cr improved to BL of 1.44 on 6/13.  Last seen by nephrology 3/2/20.  - Per nephrology, prefer higher blood pressures to ensure adequate renal perfusion, not on any BP meds  - Continue Iron supplementation for anemia of CKD  - Avoid nephrotoxins  - Is due for follow up with nephrology on discharge    Peripheral edema. Weight on admission was recorded at 67.5kg, wt 70.3kg today. It appears admission weight is outlier after extensive chart review from appointments over the past 6 months where patients EDW has been ~70-71kg. Pt reports increased edema, on exam pt has trace-1+ edema in BLE feet to calves, does not extend past knees which appears consistent with exam on presentation to the ED. Cont to monitor.      BPH. Continue flomax.     HLD. Continue statin.     Attestation:  I have reviewed today's vital signs, notes, medications, labs and imaging.    Pilar Barnes PA-C  Internal Medicine [facility] Hospitalist  [phone]  June 16, 2020

## 2020-06-16 NOTE — TELEPHONE ENCOUNTER
Called Genny back and left message that we cancel the appointment and have the schedulers reschedule it.

## 2020-06-16 NOTE — PROGRESS NOTES
"Pt to desk and stating he is leaving. Showed patient the 12 hour intent to leave paperwork and reviewed possible outcomes with 12 hour intent.   Informed patient that his best option was to work with his physician and decide together about discharge and that he did not appear ready for discharge \"well I agree with you but the lord has said it's time to go.\"  When reviewing the options patient stated if he were put on a hold \"you all will not exist anymore. And that is not a threat.\"   Patient at this time did not sign the 12 hour intent. He would like review some information on amber first.       Met with patient to review 12 hour intent and POC. Noted patient to be tremulous and asked if this was related to blood sugar or if he always had tremor. Pt states he does have a tremor, but felt this was related to hypoglycemia. Checked BG and it was 81. Pt reports he has sx below 100. Pt given snack then continued discussion about POC and 12 hour intent.   Patient speech pressured, tangential, and grandiose. Endorsed feelings of paranoia and poor sleep as well. He was difficult to keep on track with discussion but eventually decided not to sign 12 hour intent though he wants to discuss this decision further with Mounika BOJORQUEZ his  and \"friend\". Allowed patient to call from interview room with writer present (also offered use of phone in hallway) and patient left her a voicemail. He continues to state that he will get the \"feds here with one phone call\" \"you all have gone past [place] law. This is federal laws you are violating.\"     Recheck of FSBG was 99. Patient requested additional snack, which was given. CHO for snacks not covered d/t reporting feelings of internal shakiness as well as observed tremors. Patient states he also feels slightly dizzy.     Patient may benefit from change to PRN insulin orders to the 20:1 cho to insulin (to match new parameters of scheduled insulin) as well as change to hold " parameters to higher level than 60 for both scheduled and PRN insulin coverage.

## 2020-06-16 NOTE — PROGRESS NOTES
"Pt noted to be ambulating in more[place] and observed by co-RN to stumble slightly and sway towards the wall, pt righted himself and did not touch the wall. Pt was escorted back to his room by staff and endorsed dizziness, vital signs taken at this time, /84, P 97, T 97.9, O2 100%. When pt asked if he was still dizzy he reported \"I'm tired as hell,\" writer encouraged him to stay in bed if he was feeling so tired and pt agreeable, rolled over to try to go back to sleep. Will continue to monitor and support plan of care.   "

## 2020-06-16 NOTE — PLAN OF CARE
"Patient participated in project group. He selected a project after general introduction to group given. He was social with others and encouraging of young male peer, occasionally clapping him on the shoulder saying \"you're doing a great job.\" Patient process with his chosen task was a bit disorganized, he explained what he was doing, how each addition was a representation, of his baseball career, of his aunt, of his mother. His thoughts became loose here. He continued to clean up when cued and instead of rinsing out his brush in the sink used a cup of water and was painting the cardboard table protector in a fashion that appeared unclear what he was doing. Writer asked him what his end game was and he said \"cleaning the brush.\" Writer showed him a technique. He was polite and enthusiastic. He was given feedback not to touch others in the hospital, and validation that he was trying to be supportive. He verbalized understanding saying \"that wasn't the right thing to do.\"     "

## 2020-06-16 NOTE — PROGRESS NOTES
"General acute hospital   Psychiatric Progress Note  Hospital Day: 4   Telemedicine Visit: The patient's condition can be safely assessed and treated via synchronous audio and visual telemedicine encounter.      Start Time: 1120  Stop Time: 1135    Reason for Telemedicine Visit: Covid-19    Originating Site (Patient Location): [facility]    Distant Site (Provider Location): Provider Remote Setting    Consent:  The patient/guardian has verbally consented to: the potential risks and benefits of telemedicine (video visit) versus in person care; bill my insurance or make self-payment for services provided; and responsibility for payment of non-covered services.     Mode of Communication:  Video Conference via [employer]    As the provider I attest to compliance with applicable laws and regulations related to telemedicine.           Interim History:   The patient's care was discussed with the treatment team during the daily team meeting and/or staff's chart notes were reviewed.  Staff report patient was visible in the milieu, did come out of room a few times overnight and needing to be redirected back to bed, continues to be grandiose, taking medications as prescribed, no acute events overnight.     Upon interview, the patient reports his mood is \"above expectations\", he denies it is elevated and instead describes it as \"rational\". He expressed his gratitude for care on unit, reports he feels he is making progress and feeling healthier. Encouraged him to continue to work with team to stabilize his health needs, including sleep and disorganization. He tolerated the increased olanzapine dose. He denies having any SI or HI, denies AVH or paranoia. Continues to make grandiose statements about money and will often bring up politics, needed to be redirected to treatment plan on several occasions. He also continues to request to have the words Bipolar Disorder removed from all of " his medical records/charts. Again encouraged him to focus on symptoms he is experiencing and not diagnosis. He is agreeable for in home nursing referral to assist in teaching him about managing his diabetes, carb counting, insulin, etc. He is aware his care will be transferred to another physician tomorrow, he is requesting it be passed along that he would like to go home on Thursday morning, discussed the assessment of his readiness for discharge would be done by new physician. No additional concerns at this time.     Writer later contacted by RN that patient observed making multiple phones calls attempting to trade stocks and make financial moves, placed phone restriction and encouraging patient to not make big life decisions while in the hospital.            Medications:       aspirin  81 mg Oral Daily     cyanocobalamin  1,000 mcg Oral Daily     ferrous sulfate  325 mg Oral Daily     insulin aspart   Subcutaneous TID w/meals     insulin aspart  1-7 Units Subcutaneous TID AC     insulin aspart  1-5 Units Subcutaneous At Bedtime     insulin degludec  26 Units Subcutaneous At Bedtime     lamiVUDine  100 mg Oral Daily     OLANZapine  10 mg Oral At Bedtime     polyethylene glycol  17 g Oral Daily     rosuvastatin  5 mg Oral Daily     tacrolimus  4 mg Oral Q12H     tamsulosin  0.4 mg Oral Daily     vitamin D3  2,000 Units Oral Daily          Allergies:     Allergies   Allergen Reactions     Codeine Other (See Comments)     Cannot take due to liver  Cannot tolerate oral narcotics     Seasonal Allergies      Sneezing, coughing, runny and itchy eyes          Labs:     Recent Results (from the past 48 hour(s))   Glucose by meter    Collection Time: 06/14/20  8:24 AM   Result Value Ref Range    Glucose 151 (H) 70 - 99 mg/dL   Glucose by meter    Collection Time: 06/14/20 10:25 AM   Result Value Ref Range    Glucose 115 (H) 70 - 99 mg/dL   Glucose by meter    Collection Time: 06/14/20 12:12 PM   Result Value Ref Range     Glucose 90 70 - 99 mg/dL   Glucose by meter    Collection Time: 06/14/20  3:04 PM   Result Value Ref Range    Glucose 121 (H) 70 - 99 mg/dL   Glucose by meter    Collection Time: 06/14/20  4:18 PM   Result Value Ref Range    Glucose 109 (H) 70 - 99 mg/dL   Glucose by meter    Collection Time: 06/14/20  5:48 PM   Result Value Ref Range    Glucose 155 (H) 70 - 99 mg/dL   Glucose by meter    Collection Time: 06/14/20  7:52 PM   Result Value Ref Range    Glucose 112 (H) 70 - 99 mg/dL   Glucose by meter    Collection Time: 06/14/20  9:36 PM   Result Value Ref Range    Glucose 135 (H) 70 - 99 mg/dL   Glucose by meter    Collection Time: 06/15/20  2:34 AM   Result Value Ref Range    Glucose 117 (H) 70 - 99 mg/dL   Tacrolimus level    Collection Time: 06/15/20  8:14 AM   Result Value Ref Range    Tacrolimus Last Dose Not Provided     Tacrolimus Level 9.0 5.0 - 15.0 ug/L   Glucose by meter    Collection Time: 06/15/20  8:22 AM   Result Value Ref Range    Glucose 93 70 - 99 mg/dL   Glucose by meter    Collection Time: 06/15/20 10:38 AM   Result Value Ref Range    Glucose 101 (H) 70 - 99 mg/dL   Glucose by meter    Collection Time: 06/15/20 12:24 PM   Result Value Ref Range    Glucose 73 70 - 99 mg/dL   Glucose by meter    Collection Time: 06/15/20  2:28 PM   Result Value Ref Range    Glucose 139 (H) 70 - 99 mg/dL   Glucose by meter    Collection Time: 06/15/20  5:41 PM   Result Value Ref Range    Glucose 154 (H) 70 - 99 mg/dL   Glucose by meter    Collection Time: 06/15/20  8:01 PM   Result Value Ref Range    Glucose 80 70 - 99 mg/dL   Glucose by meter    Collection Time: 06/15/20  9:37 PM   Result Value Ref Range    Glucose 101 (H) 70 - 99 mg/dL   Glucose by meter    Collection Time: 06/15/20 10:37 PM   Result Value Ref Range    Glucose 141 (H) 70 - 99 mg/dL   Glucose by meter    Collection Time: 06/16/20  3:16 AM   Result Value Ref Range    Glucose 128 (H) 70 - 99 mg/dL          Psychiatric Examination:     BP (!) 145/84  "(BP Location: Right arm)   Pulse 90   Temp 97.9  F (36.6  C) (Oral)   Resp 16   Wt 67.5 kg (148 lb 12.8 oz)   SpO2 100%   BMI 21.97 kg/m    Weight is 148 lbs 12.8 oz  Body mass index is 21.97 kg/m .    Orthostatic Vitals       Most Recent      Sitting Orthostatic /65 06/16 0758    Sitting Orthostatic Pulse (bpm) 108 06/16 0758    Standing Orthostatic /68 06/15 0700    Standing Orthostatic Pulse (bpm) 103 06/15 0700        Appearance: awake, alert, adequately groomed and appears recorded age  Attitude:  mostly cooperative  Eye Contact:  fair  Mood:  \"above expectations\"  Affect:  appropriate and in normal range and mood congruent  Speech:  clear, coherent and mildly pressured, hyperverbal but interruptible and improved from yesterday  Language: fluent and intact in English  Psychomotor, Gait, Musculoskeletal:  no evidence of tardive dyskinesia, dystonia, or tics  Thought Process:  tangential  Associations:  no loose associations  Thought Content:  no evidence of suicidal ideation or homicidal ideation and grandiosity/delusions present  Insight:  limited  Judgement:  limited, improving  Oriented to:  time, person, and place  Attention Span and Concentration:  fair  Recent and Remote Memory:  fair  Fund of Knowledge:  appropriate    Clinical Global Impressions  First:  Considering your total clinical experience with this particular patient population, how severe are the patient's symptoms at this time?: 7 (06/13/20 0819)  Compared to the patient's condition at the START of treatment, this patient's condition is: 4 (06/13/20 0819)  Most recent:  Considering your total clinical experience with this particular patient population, how severe are the patient's symptoms at this time?: 7 (06/13/20 0819)  Compared to the patient's condition at the START of treatment, this patient's condition is: 4 (06/13/20 0819)           Precautions:     Behavioral Orders   Procedures     Assault precautions     Code 1 - " Restrict to Unit     Elopement precautions     Routine Programming     As clinically indicated     Single Room     Status 15     Every 15 minutes.          Diagnoses:   Bipolar disorder type 1, manic, severe with psychosis  Status post orthotopic liver transplant 11/11/2019 secondary to ESLD related to ESTRADA, hepatocellular carcinoma  Type 2 diabetes, insulin-dependent, with retinopathy  Acute kidney injury on chronic kidney disease stage 3  Benign prostatic hypertrophy  Hyperlipidemia         Assessment & Plan:   Assessment and hospital summary:  is a 55-year-old male admitted to [address] on 6/11/2020, arriving on the unit 6/12/2020.  He was admitted on a 72-hour hold through the [facility] ER due to agitation and manic symptoms.  [facility] and asked someone to call 911 due to low BG.  He took a taxi to the ER.  He exhibited bizarre behaviors and tangential speech.  He has a history of multiple medical issues including liver transplant in 11/2019, chronic kidney disease and insulin-dependent diabetes.  Tacrolimus (immunosuppressant to reduce chances of rejection of transplanted liver) level was low upon admission and he reports missing some doses.  His transplant coordinator and friend have initiated multiple welfare checks. In the ER he was very agitated and was placed in restraints.  Upon admission to the unit he remained agitated and staff reported he had had poor boundaries and had been touching staff and patients requiring redirection. Has not required any further use of restraints. IM was consulted on admission 2/2 medical complexity. PTA olanzapine was continued along with PRN clonazepam to target amber. Pt agreed to sign in voluntary 6/12 and increase olanzapine to 10mg to further target poor sleep and disorganization. He continues to report he does not have bipolar disorder and show limited insight into mental health symptoms but continues to agree to  remain in the hospital and work with team to stabilize his health overall.     Psychiatric treatment/inteventions:  Medications:   -continue PTA olanzapine at increased dose of 10mg at bedtime  -continue PTA clonazepam 1mg at bedtime PRN sleep     Laboratory/Imaging: no new labs per psychiatry, IM following as below     Patient will be treated in therapeutic milieu with appropriate individual and group therapies as described.     Medical treatment/interventions:  IM CONSULT PLACED ON ADMISSION 6/12, PER MOST RECENT PROGRESS NOTE FROM 6/16:  Frandy Workman is a 55 year old male w/ a pmhx of ESTRADA cirrhosis c/b ascites, HE and HCC s/p liver transplant 11/11/19, HTN, HLD, DMII, GERD, BPH and CKD who was admitted to [facility] station [facility] due to decompensated psychiatric illness.     Bipolar I disorder.  Management per primary team, psychiatry.      S/p orthotopic liver transplant (11/11/19) 2/2 ESLD r/t ESTRADA, HCC. Follows with Dr. Banuelos of hepatology, most recently seen via virtual visit on 5/26/20. LFT's normal. Renal function stable. Seen by Oncology 5/26/20 and no evidence of recurrent HCC on recent imagining. It appears patient not compliant with meds pta.   - Immunosuppression: tacro 4mg q12h, goal 6-8, 11 hour trough 5.8 today. D/w transplant coordinator - plan to continue current dose and follow up with transplant on discharge  - Continue lamivudine for ppx  - Follow up with Hepatology in 3 months as previously directed     DMII with retinopathy. Most recent Hgb A1C 4.8 on 3/4. Recent virtual visit with Endocrine provider on 5/7/20. Last seen by ophthalmology 3/4/20. BGs have been running on low end , complains of hypoglycemic symptoms when his BGs drop below 100.   - Decrease tresiba to 24U qPM  - Decrease novolog to 1U per 20g carb TID with meals  - Stop med sliding scale insulin as not requiring  - BG TID ac, at bedtime, and 0200  - Hypoglycemia protocol  - Will need follow up with Ophthalmology on discharge  (was supposed to f/u in April)     JERONIMO on CKD stage III. Resolved. 2/2 diabetic nephropathy and tacrolimus therapy s/p transplant. Bl Cr ~ 1.5, 1.7 on admission s/p 1L LR bolus in ED as pt appeared dry on admission. Cr improved to BL of 1.44 on 6/13.  Last seen by nephrology 3/2/20.  - Per nephrology, prefer higher blood pressures to ensure adequate renal perfusion, not on any BP meds  - Continue Iron supplementation for anemia of CKD  - Avoid nephrotoxins  - Is due for follow up with nephrology on discharge     Peripheral edema. Weight on admission was recorded at 67.5kg, wt 70.3kg today. It appears admission weight is outlier after extensive chart review from appointments over the past 6 months where patients EDW has been ~70-71kg. Pt reports increased edema, on exam pt has trace-1+ edema in BLE feet to calves, does not extend past knees which appears consistent with exam on presentation to the ED. Cont to monitor.      BPH. Continue flomax.     HLD. Continue statin.      Attestation:  I have reviewed today's vital signs, notes, medications, labs and imaging.     Pilar Barnes PA-C  Internal [facility] Hospitalist      This note was created by undersigned using a Dragon dictation system. All typing errors or contextual distortion are unintentional and software inherent.     Disposition Plan   Reason for ongoing admission: is unable to care for self due to severe psychosis or amber  Discharge location: home with self-care and in home nursing referral placed today  Discharge Medications: not ordered  Follow-up Appointments: not scheduled  Legal Status: voluntary, pt signed in 6/15  Entered by: Elsa Díaz on 6/16/2020 at 7:21 AM

## 2020-06-16 NOTE — PROGRESS NOTES
Pt c/o weight gain and increase in edema. Writer checked weights and per our flowsheet pt gained slightly over 6lbs in 2 days (wt verified and rechecked).  Writer paged on-call Pilar Barnes and she dug deeper into chart and today's weight has been his baseline and he was dehydrated a couple of days ago.  On-call aware of pt and post-transplant status.      Writer also informed on call of insulin status.  Writer concerned of current carb count status due to pt have periods of being symptomatic with blood sugars under 100 and needing OJ, Pudding etc throughout today to keep him over 100.  On-call agreed and will reassess carb/insulin orders.      Pt had noticeable slurred speech and c/o dizzy spells when blood sugars are under 100 and appears somewhat unsteady on feet.     Pt discusses money frequently and stock with peers/staff.  Pt needs frequent reminders not to touch other peers or get to close, pt invades personal space.      On-call PA came to unit to assess pt.

## 2020-06-16 NOTE — PROGRESS NOTES
"Patient was visible on the milieu and interacted with peers and staff. He had a full range affect and was restless and hyper-verbal. Continues to be delusional and grandiose; has poor insight into his mental health problems telling writer \"I am here to catch up on sleep and my appetite\" when asked how he was doing. He denies anxiety, depression, SI/SIB or other mental health symptoms. Patient was compliant with medications, ate about 50% of his meal and performed personal hygiene. Had no incidence of agitation or aggression and was safe.    "

## 2020-06-16 NOTE — PROGRESS NOTES
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 Pt asked staff if he could use a phone or ipad to do this and \"if there was any amount of money that could make this happen.\" Pt verbally redirectable, reminded that any access to computers would need to go through his provider. Pt later asked for writer to assess his ankle which he reports is in pain, 5/10, +1 edema noted in bilateral lower extremities primarily towards the back of the foot and along the medial portion of his lower legs, pt also concerned about toenail on his right greater toe which appears to have a subungual hematoma which he attributes to his restraint episode in the ED prior to admission. Pt walked with writer back to his room and was assisted into bed and legs elevated at this time.   "

## 2020-06-17 ENCOUNTER — TELEPHONE (OUTPATIENT)
Dept: PHARMACY | Facility: CLINIC | Age: 55
End: 2020-06-17

## 2020-06-17 LAB
GLUCOSE BLDC GLUCOMTR-MCNC: 145 MG/DL (ref 70–99)
GLUCOSE BLDC GLUCOMTR-MCNC: 167 MG/DL (ref 70–99)
GLUCOSE BLDC GLUCOMTR-MCNC: 84 MG/DL (ref 70–99)
GLUCOSE BLDC GLUCOMTR-MCNC: 89 MG/DL (ref 70–99)
GLUCOSE BLDC GLUCOMTR-MCNC: 92 MG/DL (ref 70–99)
GLUCOSE BLDC GLUCOMTR-MCNC: 94 MG/DL (ref 70–99)
GLUCOSE BLDC GLUCOMTR-MCNC: 97 MG/DL (ref 70–99)

## 2020-06-17 PROCEDURE — 25000132 ZZH RX MED GY IP 250 OP 250 PS 637: Mod: GY | Performed by: PHYSICIAN ASSISTANT

## 2020-06-17 PROCEDURE — 25000132 ZZH RX MED GY IP 250 OP 250 PS 637: Mod: GY | Performed by: PSYCHIATRY & NEUROLOGY

## 2020-06-17 PROCEDURE — G0177 OPPS/PHP; TRAIN & EDUC SERV: HCPCS

## 2020-06-17 PROCEDURE — 25000131 ZZH RX MED GY IP 250 OP 636 PS 637: Mod: GY | Performed by: PHYSICIAN ASSISTANT

## 2020-06-17 PROCEDURE — 00000146 ZZHCL STATISTIC GLUCOSE BY METER IP

## 2020-06-17 PROCEDURE — 12400001 ZZH R&B MH UMMC

## 2020-06-17 PROCEDURE — 99232 SBSQ HOSP IP/OBS MODERATE 35: CPT | Mod: 95 | Performed by: PSYCHIATRY & NEUROLOGY

## 2020-06-17 RX ADMIN — ROSUVASTATIN CALCIUM 5 MG: 5 TABLET, FILM COATED ORAL at 08:28

## 2020-06-17 RX ADMIN — INSULIN ASPART 3 UNITS: 100 INJECTION, SOLUTION INTRAVENOUS; SUBCUTANEOUS at 18:00

## 2020-06-17 RX ADMIN — TAMSULOSIN HYDROCHLORIDE 0.4 MG: 0.4 CAPSULE ORAL at 08:28

## 2020-06-17 RX ADMIN — INSULIN ASPART 2 UNITS: 100 INJECTION, SOLUTION INTRAVENOUS; SUBCUTANEOUS at 13:44

## 2020-06-17 RX ADMIN — CLONAZEPAM 1 MG: 0.5 TABLET ORAL at 00:20

## 2020-06-17 RX ADMIN — MELATONIN 2000 UNITS: at 08:28

## 2020-06-17 RX ADMIN — TACROLIMUS 4 MG: 1 CAPSULE ORAL at 18:00

## 2020-06-17 RX ADMIN — FERROUS SULFATE TAB 325 MG (65 MG ELEMENTAL FE) 325 MG: 325 (65 FE) TAB at 08:28

## 2020-06-17 RX ADMIN — INSULIN ASPART 4 UNITS: 100 INJECTION, SOLUTION INTRAVENOUS; SUBCUTANEOUS at 08:56

## 2020-06-17 RX ADMIN — POLYETHYLENE GLYCOL 3350 17 G: 17 POWDER, FOR SOLUTION ORAL at 08:28

## 2020-06-17 RX ADMIN — OLANZAPINE 10 MG: 10 TABLET ORAL at 21:54

## 2020-06-17 RX ADMIN — ASPIRIN 81 MG: 81 TABLET ORAL at 08:28

## 2020-06-17 RX ADMIN — TACROLIMUS 4 MG: 1 CAPSULE ORAL at 06:42

## 2020-06-17 RX ADMIN — LAMIVUDINE 100 MG: 100 TABLET, FILM COATED ORAL at 08:28

## 2020-06-17 RX ADMIN — CYANOCOBALAMIN TAB 1000 MCG 1000 MCG: 1000 TAB at 08:28

## 2020-06-17 ASSESSMENT — ACTIVITIES OF DAILY LIVING (ADL)
ORAL_HYGIENE: INDEPENDENT
DRESS: STREET CLOTHES;INDEPENDENT
HYGIENE/GROOMING: INDEPENDENT
LAUNDRY: WITH SUPERVISION
HYGIENE/GROOMING: HANDWASHING;SHOWER;INDEPENDENT
ORAL_HYGIENE: INDEPENDENT
DRESS: INDEPENDENT

## 2020-06-17 NOTE — PROGRESS NOTES
"  Work Completed:  Teamed on pt. Reviewed notes.  Pt is expecting to leave tomorrow. We discussed the possibility of placing pt on a hold. Dr. Harris will assess.    Pt did attempt to attend my psycho-education group on Self Compassion but he said he had to take care of \"medical stuff.\" Additionally the group was too full.    I have been getting his medical services in order.    I called [facility] and they said he is open. [phone]      I spoke with Anemia Management and placed this on the AVS.  Expect a call from Jannet at the [facility], She is going to call you about your aranesp injections when your hemoglobin is below 11.5. You have wanted this in your home but this is not a viable plan. You will need to come into the clinic.  [facility]  [facility]  [address]  [place], [place]  P: [phone]  F: [phone]        Hepatology (Liver) Clinic [facility], [address]  [address] [place] [address]   Appointments: [phone]           You were due  for follow up with nephrology upon discharge  Return Visit with Eloy Rao MD   Monday Jun 29, 2020 10:30 AM (Arrive by 10:15 AM)  [facility]   [address] [place] [id number] [phone]      Nephrology (Kidney) Clinic [facility] [address], [address]  [address] [place] [id number]                 You missed your cardiology appointment. Reschedule  with [facility]. Jemima in Cardiology @ [phone] said that they will be calling you to reschedule.  [facility] Heart [facility] [address], [address] [place] [address]   Appointments: [phone]           Will need follow up with Ophthalmology on discharge (was supposed to f/u in April)  Eye " (Ophthalmology) Clinic [facility]   Floor 9, Clinic 9  [address] [place], [place] [address]                      Discharge plan or goal:           Barriers to discharge:     None

## 2020-06-17 NOTE — PROGRESS NOTES
Brief Medicine Note  June 17, 2020    Medicine following BGs peripherally.     DMII with retinopathy. Most recent Hgb A1C 4.8 on 3/4. Recent virtual visit with Endocrine provider on 5/7/20. Last seen by ophthalmology 3/4/20. BGs have been running on low end  prompting decrease in insulin requirements as patient complains of hypoglycemic symptoms when his BGs drop below 100.  - Decrease tresiba to 20U qPM  - Cont novolog to 1U per 20g carb TID with meals  - BG TID ac, at bedtime, and 0200  - Hypoglycemia protocol  - Will need follow up with Ophthalmology on discharge (was supposed to f/u in April)    Pilar Barnes PA-C

## 2020-06-17 NOTE — TELEPHONE ENCOUNTER
Corrected the Aranesp dose that was given on 6/3/20. Miller received 40mcg not 60mcg as previously documented.     Jie Blum RN   Anemia Services  [address] [address]   [email or web address]   Office : [phone]  Fax: [phone]

## 2020-06-17 NOTE — PLAN OF CARE
"Pt irritable at the beginning of the shift due to phone restrictions (see note) but spent the remainder of the evening socializing with peers.   During check in, patient displayed pressured, rambling speech with flight of ideas. He required redirection and cues to remain on topic. When discussing his symptoms of amber he replied \"that's me\" to the following: poor sleep, restless, impulsive, paranoid, making poor decisions, and grandiose.   Patient denies SI. Made impulsive vague statements about the unit staff \"not existing\" if he were placed on a hold but nothing specific and patient was not placed on a hold.   Pt cooperative with medications. Did not cover snacks with insulin due to lability of FSBG's today.   Length of stay: 4     Assessment of mood, thought process, response to medications and potential side effects continues.     Patient encouraged to participate in therapeutic groups and develop self-care skills.     Appropriate precautions maintained.    "

## 2020-06-17 NOTE — PROGRESS NOTES
06/17/20 1500   Significant Event   Significant Event Other (see comments)  (pt cont to struggle with mood stability and boundaries.)       Pt remains pleasant and very sociable, at times needing limits set as he has poor boundaries, ie, handshaking, personal questions and general problems with being overly-energetic. He always remains polite and accepts limits when set but seems persistent and repetitive with his conversation. He deniesMH sx's and is at times offended by his tx plan, pt believes his sx's are a result of low blood sugar. 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

## 2020-06-17 NOTE — PROGRESS NOTES
"Pt struggling with sleep and very restless at the beginning of the shift, PRN clonazepam administered at 0200. Pt re-directed several times to try to sleep and remain in bed but he continued to approach the desk every few minutes and appear restless despite being obviously tired (eyes half closed, multiple yawns). Pt also noted to be less steady on his feet, he reports this is from \"being so tired, when I'm at home I use my cane.\" Pt was assisted to his room and tucked into bed by this writer who then remained in doorway for pt restlessness and fall risk. Pt struggled to get comfortable and fall asleep, continues to request a chair he can sleep in. Pt finally fell asleep at 0245 and writer left his doorway, continued on 15 minute checks for safety but pt appears to be asleep at this time. Will continue to monitor and support plan of care.   "

## 2020-06-17 NOTE — TELEPHONE ENCOUNTER
Follow-up with anemia management service:    Admitted 6/11/20 for behavorial    Spoke with Marie barlow upcoming discharge and if Aranesp needs to be continued.    Miller needs to continue Aranep every 2 weeks at clinic. Typically goes to the  [facility]fusion [facility].     Anemia Latest Ref Rng & Units 4/8/2020 4/22/2020 5/6/2020 5/8/2020 5/21/2020 6/3/2020 6/11/2020   IVELISSE Dose - 40mcg 40mcg - 40mcg 40mcg 60 mcg -   Hemoglobin 13.3 - 17.7 g/dL 9.1(L) 10.2(L) 10.2(A) - 11.1(L) 10.7(L) 9.2(L)   TSAT 15 - 46 % - 30 - - 38 - -   Ferritin 26 - 388 ng/mL - 643(H) - - 344 - -   PRBCs - - - - - - - -           Follow-up call date: 6/24/2020    Jie Blum RN   Anemia Services  [address] [address]   [email or web address]   Office : [phone]  Fax: [phone]

## 2020-06-17 NOTE — PLAN OF CARE
"Patient participated in project group. He was focused on his project with prompts and suggestions. He made some odd assertions like \"my  said I can't even mention my daughters name or I'll have a felony.\" He cleaned up slowly once cues were given, distracted by music, details, conversation.   Topic group focused on kindness with education, discussion, and a structured activity to promote practice and reception of kindness. He required some prompts for making commanding-sounding statements to others. He required some general guidelines for appropriate contribution but he followed these with examples put on the board for reminders. He asked for writer's opinion if his messages were appropriate, which they were. He reported he enjoyed writing kind messages to others, it was easy, and he was touched to receive kind messages from others feeling people \"care about me.\"   "

## 2020-06-17 NOTE — PROGRESS NOTES
"[facility], [place]   Psychiatric Progress Note  Hospital Day: 5   Telemedicine Visit: The patient's condition can be safely assessed and treated via synchronous audio and visual telemedicine encounter.      Start Time: 1428  Stop Time: 1453    Reason for Telemedicine Visit: Covid-19    Originating Site (Patient Location): [facility]    Distant Site (Provider Location): Provider Remote Setting    Consent:  The patient/guardian has verbally consented to: the potential risks and benefits of telemedicine (video visit) versus in person care; bill my insurance or make self-payment for services provided; and responsibility for payment of non-covered services.     Mode of Communication:  Video Conference via [employer]    As the provider I attest to compliance with applicable laws and regulations related to telemedicine.           Interim History:   The patient's care was discussed with the treatment team during the daily team meeting and/or staff's chart notes were reviewed.  Staff reports that patient continues to show poor insight. He claims that he doesn't accept that he's bipolar as it is way to label people. He continues to show signs of amber, most notably making impulsive and unwise financial decisions.    Upon interview, the patient tells me that he is working on his \"anger problem.\" He does say that he has finally realized that he should use his insulin. He is extremely tangential, a bit grandiose, and notably irritable during our conversation. He eventually agrees that he needs more time in the hospital.             Medications:       aspirin  81 mg Oral Daily     cyanocobalamin  1,000 mcg Oral Daily     ferrous sulfate  325 mg Oral Daily     insulin aspart   Subcutaneous TID w/meals     insulin degludec  20 Units Subcutaneous At Bedtime     lamiVUDine  100 mg Oral Daily     OLANZapine  10 mg Oral At Bedtime     polyethylene glycol  17 g Oral Daily     " rosuvastatin  5 mg Oral Daily     tacrolimus  4 mg Oral Q12H     tamsulosin  0.4 mg Oral Daily     vitamin D3  2,000 Units Oral Daily          Allergies:     Allergies   Allergen Reactions     Codeine Other (See Comments)     Cannot take due to liver  Cannot tolerate oral narcotics     Seasonal Allergies      Sneezing, coughing, runny and itchy eyes          Labs:     Recent Results (from the past 48 hour(s))   Glucose by meter    Collection Time: 06/15/20  5:41 PM   Result Value Ref Range    Glucose 154 (H) 70 - 99 mg/dL   Glucose by meter    Collection Time: 06/15/20  8:01 PM   Result Value Ref Range    Glucose 80 70 - 99 mg/dL   Glucose by meter    Collection Time: 06/15/20  9:37 PM   Result Value Ref Range    Glucose 101 (H) 70 - 99 mg/dL   Glucose by meter    Collection Time: 06/15/20 10:37 PM   Result Value Ref Range    Glucose 141 (H) 70 - 99 mg/dL   Glucose by meter    Collection Time: 06/16/20  3:16 AM   Result Value Ref Range    Glucose 128 (H) 70 - 99 mg/dL   Tacrolimus level    Collection Time: 06/16/20  6:13 AM   Result Value Ref Range    Tacrolimus Last Dose Not Provided     Tacrolimus Level 5.8 5.0 - 15.0 ug/L   Glucose by meter    Collection Time: 06/16/20  7:31 AM   Result Value Ref Range    Glucose 93 70 - 99 mg/dL   Glucose by meter    Collection Time: 06/16/20  9:56 AM   Result Value Ref Range    Glucose 102 (H) 70 - 99 mg/dL   Glucose by meter    Collection Time: 06/16/20 12:14 PM   Result Value Ref Range    Glucose 80 70 - 99 mg/dL   Glucose by meter    Collection Time: 06/16/20  2:01 PM   Result Value Ref Range    Glucose 148 (H) 70 - 99 mg/dL   Glucose by meter    Collection Time: 06/16/20  4:33 PM   Result Value Ref Range    Glucose 81 70 - 99 mg/dL   Glucose by meter    Collection Time: 06/16/20  5:04 PM   Result Value Ref Range    Glucose 99 70 - 99 mg/dL   Glucose by meter    Collection Time: 06/16/20  5:34 PM   Result Value Ref Range    Glucose 146 (H) 70 - 99 mg/dL   Glucose by meter     Collection Time: 06/16/20 10:11 PM   Result Value Ref Range    Glucose 208 (H) 70 - 99 mg/dL   Glucose by meter    Collection Time: 06/17/20  2:00 AM   Result Value Ref Range    Glucose 145 (H) 70 - 99 mg/dL   Glucose by meter    Collection Time: 06/17/20  6:48 AM   Result Value Ref Range    Glucose 84 70 - 99 mg/dL   Glucose by meter    Collection Time: 06/17/20  8:15 AM   Result Value Ref Range    Glucose 92 70 - 99 mg/dL   Glucose by meter    Collection Time: 06/17/20 10:36 AM   Result Value Ref Range    Glucose 94 70 - 99 mg/dL   Glucose by meter    Collection Time: 06/17/20 12:34 PM   Result Value Ref Range    Glucose 89 70 - 99 mg/dL          Psychiatric Examination:     /65 (BP Location: Right arm)   Pulse 108   Temp 98.1  F (36.7  C) (Oral)   Resp 16   Wt 70.3 kg (155 lb)   SpO2 100%   BMI 22.89 kg/m    Weight is 155 lbs 0 oz  Body mass index is 22.89 kg/m .      Orthostatic Vitals       Most Recent      Sitting Orthostatic /72 06/17 0735    Sitting Orthostatic Pulse (bpm) 88 06/17 0735    Standing Orthostatic /70 06/17 0735    Standing Orthostatic Pulse (bpm) 97 06/17 0735            Appearance: awake, alert, adequately groomed and appears recorded age  Attitude:  cooperative  Eye Contact:  intense  Mood:  irritable  Affect:  appropriate and in normal range, mood congruent and reactive  Speech:  clear, coherent and mildly pressured, hyperverbal but interruptible  Language: fluent and intact in English  Psychomotor, Gait, Musculoskeletal:  no evidence of tardive dyskinesia, dystonia, or tics  Thought Process:  tangential  Associations:  no loose associations  Thought Content:  no evidence of suicidal ideation or homicidal ideation and grandiosity/delusions present  Insight:  limited  Judgement:  limited, improving  Oriented to:  time, person, and place  Attention Span and Concentration:  fair  Recent and Remote Memory:  fair  Fund of Knowledge:  appropriate      Clinical Global  Impressions  First:  Considering your total clinical experience with this particular patient population, how severe are the patient's symptoms at this time?: 7 (06/13/20 0819)  Compared to the patient's condition at the START of treatment, this patient's condition is: 4 (06/13/20 0819)  Most recent:  Considering your total clinical experience with this particular patient population, how severe are the patient's symptoms at this time?: 7 (06/13/20 0819)  Compared to the patient's condition at the START of treatment, this patient's condition is: 4 (06/13/20 0819)             Precautions:     Behavioral Orders   Procedures     Assault precautions     Code 1 - Restrict to Unit     Elopement precautions     Routine Programming     As clinically indicated     Single Room     Status 15     Every 15 minutes.          Diagnoses:   Bipolar disorder type 1, manic, severe with psychosis  Status post orthotopic liver transplant 11/11/2019 secondary to ESLD related to ESTRADA, hepatocellular carcinoma  Type 2 diabetes, insulin-dependent, with retinopathy  Acute kidney injury on chronic kidney disease stage 3  Benign prostatic hypertrophy  Hyperlipidemia         Assessment & Plan:   Assessment and hospital summary:  is a 55-year-old male admitted to [address] on 6/11/2020, arriving on the unit 6/12/2020.  He was admitted on a 72-hour hold through the [facility] ER due to agitation and manic symptoms.  He was at the [facility] and asked someone to call 911 due to low BG.  He took a taxi to the ER.  He exhibited bizarre behaviors and tangential speech.  He has a history of multiple medical issues including liver transplant in 11/2019, chronic kidney disease and insulin-dependent diabetes.  Tacrolimus (immunosuppressant to reduce chances of rejection of transplanted liver) level was low upon admission and he reports missing some doses.  His transplant coordinator and friend have initiated  multiple welfare checks. In the ER he was very agitated and was placed in restraints.  Upon admission to the unit he remained agitated and staff reported he had had poor boundaries and had been touching staff and patients requiring redirection. Has not required any further use of restraints. IM was consulted on admission 2/2 medical complexity. PTA olanzapine was continued along with PRN clonazepam to target amber. Pt agreed to sign in voluntary 6/12 and increase olanzapine to 10mg to further target poor sleep and disorganization. He continues to report he does not have bipolar disorder and show limited insight into mental health symptoms but continues to agree to remain in the hospital and work with team to stabilize his health overall.    Psychiatric treatment/inteventions:  Medications:   -continue PTA olanzapine at increased dose of 10mg at bedtime  -continue PTA clonazepam 1mg at bedtime PRN sleep     Laboratory/Imaging: no new labs per psychiatry, IM following as below     Patient will be treated in therapeutic milieu with appropriate individual and group therapies as described.     Medical treatment/interventions:  IM CONSULT PLACED ON ADMISSION 6/12, PER MOST RECENT PROGRESS NOTE FROM 6/16:  Frandy Workman is a 55 year old male w/ a pmhx of ESTRADA cirrhosis c/b ascites, HE and HCC s/p liver transplant 11/11/19, HTN, HLD, DMII, GERD, BPH and CKD who was admitted to [facility] due to decompensated psychiatric illness.     Bipolar I disorder.  Management per primary team, psychiatry.      S/p orthotopic liver transplant (11/11/19) 2/2 ESLD r/t ESTRADA, HCC. Follows with Dr. Banuelos of hepatology, most recently seen via virtual visit on 5/26/20. LFT's normal. Renal function stable. Seen by Oncology 5/26/20 and no evidence of recurrent HCC on recent imagining. It appears patient not compliant with meds pta.   - Immunosuppression: tacro 4mg q12h, goal 6-8, 11 hour trough 5.8 today. D/w transplant coordinator - plan  to continue current dose and follow up with transplant on discharge  - Continue lamivudine for ppx  - Follow up with Hepatology in 3 months as previously directed     DMII with retinopathy. Most recent Hgb A1C 4.8 on 3/4. Recent virtual visit with Endocrine provider on 5/7/20. Last seen by ophthalmology 3/4/20. BGs have been running on low end , complains of hypoglycemic symptoms when his BGs drop below 100.   - Decrease tresiba to 24U qPM  - Decrease novolog to 1U per 20g carb TID with meals  - Stop med sliding scale insulin as not requiring  - BG TID ac, at bedtime, and 0200  - Hypoglycemia protocol  - Will need follow up with Ophthalmology on discharge (was supposed to f/u in April)     JERONIMO on CKD stage III. Resolved. 2/2 diabetic nephropathy and tacrolimus therapy s/p transplant. Bl Cr ~ 1.5, 1.7 on admission s/p 1L LR bolus in ED as pt appeared dry on admission. Cr improved to BL of 1.44 on 6/13.  Last seen by nephrology 3/2/20.  - Per nephrology, prefer higher blood pressures to ensure adequate renal perfusion, not on any BP meds  - Continue Iron supplementation for anemia of CKD  - Avoid nephrotoxins  - Is due for follow up with nephrology on discharge     Peripheral edema. Weight on admission was recorded at 67.5kg, wt 70.3kg today. It appears admission weight is outlier after extensive chart review from appointments over the past 6 months where patients EDW has been ~70-71kg. Pt reports increased edema, on exam pt has trace-1+ edema in BLE feet to calves, does not extend past knees which appears consistent with exam on presentation to the ED. Cont to monitor.      BPH. Continue flomax.     HLD. Continue statin.      Attestation:  I have reviewed today's vital signs, notes, medications, labs and imaging.     Pilar Barnes PA-C  Internal Medicine [facility] Hospitalist          Disposition Plan   Reason for ongoing admission: is unable to care for self due to severe psychosis or amber  Discharge  location: home with self-care and in home nursing referral placed today  Discharge Medications: not ordered  Follow-up Appointments: not scheduled  Legal Status: voluntary, pt signed in 6/15  Entered by: Gustavo Harris on 6/17/2020 at 2:28 PM

## 2020-06-18 LAB
GLUCOSE BLDC GLUCOMTR-MCNC: 150 MG/DL (ref 70–99)
GLUCOSE BLDC GLUCOMTR-MCNC: 157 MG/DL (ref 70–99)
GLUCOSE BLDC GLUCOMTR-MCNC: 174 MG/DL (ref 70–99)
GLUCOSE BLDC GLUCOMTR-MCNC: 212 MG/DL (ref 70–99)
GLUCOSE BLDC GLUCOMTR-MCNC: 232 MG/DL (ref 70–99)
GLUCOSE BLDC GLUCOMTR-MCNC: 260 MG/DL (ref 70–99)
GLUCOSE BLDC GLUCOMTR-MCNC: 267 MG/DL (ref 70–99)
GLUCOSE BLDC GLUCOMTR-MCNC: 281 MG/DL (ref 70–99)

## 2020-06-18 PROCEDURE — 25000132 ZZH RX MED GY IP 250 OP 250 PS 637: Mod: GY | Performed by: PSYCHIATRY & NEUROLOGY

## 2020-06-18 PROCEDURE — 25000131 ZZH RX MED GY IP 250 OP 636 PS 637: Mod: GY | Performed by: PHYSICIAN ASSISTANT

## 2020-06-18 PROCEDURE — H2032 ACTIVITY THERAPY, PER 15 MIN: HCPCS

## 2020-06-18 PROCEDURE — G0177 OPPS/PHP; TRAIN & EDUC SERV: HCPCS

## 2020-06-18 PROCEDURE — 00000146 ZZHCL STATISTIC GLUCOSE BY METER IP

## 2020-06-18 PROCEDURE — 25000132 ZZH RX MED GY IP 250 OP 250 PS 637: Mod: GY | Performed by: PHYSICIAN ASSISTANT

## 2020-06-18 PROCEDURE — 12400001 ZZH R&B MH UMMC

## 2020-06-18 PROCEDURE — 99233 SBSQ HOSP IP/OBS HIGH 50: CPT | Mod: 95 | Performed by: PSYCHIATRY & NEUROLOGY

## 2020-06-18 RX ADMIN — ACETAMINOPHEN 650 MG: 325 TABLET, FILM COATED ORAL at 17:45

## 2020-06-18 RX ADMIN — TACROLIMUS 4 MG: 1 CAPSULE ORAL at 06:55

## 2020-06-18 RX ADMIN — ASPIRIN 81 MG: 81 TABLET ORAL at 07:47

## 2020-06-18 RX ADMIN — CYANOCOBALAMIN TAB 1000 MCG 1000 MCG: 1000 TAB at 07:47

## 2020-06-18 RX ADMIN — OLANZAPINE 10 MG: 10 TABLET ORAL at 21:17

## 2020-06-18 RX ADMIN — POLYETHYLENE GLYCOL 3350 17 G: 17 POWDER, FOR SOLUTION ORAL at 07:47

## 2020-06-18 RX ADMIN — FERROUS SULFATE TAB 325 MG (65 MG ELEMENTAL FE) 325 MG: 325 (65 FE) TAB at 07:47

## 2020-06-18 RX ADMIN — ROSUVASTATIN CALCIUM 5 MG: 5 TABLET, FILM COATED ORAL at 07:47

## 2020-06-18 RX ADMIN — TAMSULOSIN HYDROCHLORIDE 0.4 MG: 0.4 CAPSULE ORAL at 07:47

## 2020-06-18 RX ADMIN — CLONAZEPAM 1 MG: 0.5 TABLET ORAL at 22:30

## 2020-06-18 RX ADMIN — INSULIN ASPART 3 UNITS: 100 INJECTION, SOLUTION INTRAVENOUS; SUBCUTANEOUS at 08:40

## 2020-06-18 RX ADMIN — LAMIVUDINE 100 MG: 100 TABLET, FILM COATED ORAL at 07:47

## 2020-06-18 RX ADMIN — INSULIN ASPART 2 UNITS: 100 INJECTION, SOLUTION INTRAVENOUS; SUBCUTANEOUS at 13:24

## 2020-06-18 RX ADMIN — INSULIN ASPART 3 UNITS: 100 INJECTION, SOLUTION INTRAVENOUS; SUBCUTANEOUS at 18:57

## 2020-06-18 RX ADMIN — MELATONIN 2000 UNITS: at 07:47

## 2020-06-18 RX ADMIN — TACROLIMUS 4 MG: 1 CAPSULE ORAL at 17:45

## 2020-06-18 ASSESSMENT — ACTIVITIES OF DAILY LIVING (ADL)
HYGIENE/GROOMING: HANDWASHING;SHOWER;INDEPENDENT
LAUNDRY: WITH SUPERVISION
HYGIENE/GROOMING: INDEPENDENT
DRESS: INDEPENDENT;SCRUBS (BEHAVIORAL HEALTH)
DRESS: SCRUBS (BEHAVIORAL HEALTH);INDEPENDENT
LAUNDRY: WITH SUPERVISION
ORAL_HYGIENE: INDEPENDENT
ORAL_HYGIENE: INDEPENDENT

## 2020-06-18 NOTE — PROGRESS NOTES
"Pt cooperative with blood sugar check at 0200. B. Pt hyperverbal, tense, irritable. Pt requested staff to log into [employer]. Staff were able to tell pt most recent lab results, but pt got agitated that he hasn't gotten his monthly shot (aflibercept). Pt frustrated with communication between him and doctors. Pt requested many times for staff to phone [facility] hematologist. Pt difficult to redirect during this conversation. Additionally, pt states he has a very sore back and has for many days requested to sleep in a chair. Pt has been denied this request to sleep in the lounge. Pt again requests a chair for his room to sleep in. At end of conversation pt is very apologetic but states he is very frustrated and doesn't mean to take it out on the staff here. Pt continues to pace and write letters of his concerns. Will continue to monitor and support plan of care.    Pt continues to write notes, observed to be less agitated, verbally pt states \"I'm feeling much better, thank you.\" Pt refused offers for medication to help with sleep.   "

## 2020-06-18 NOTE — PROGRESS NOTES
Work Completed:  Reviewed notes:  Teamed on pt and continued consultation throughout the day with MD and RN.  MD will continue to assess pt to see if a  72 hour hold is warranted.  MD will reassess in the morning.    Prepared AVS in case of discharge today. It was going to be an AMA discharge if he went today.        Discharge plan or goal:   Home with services        Barriers to discharge:   None

## 2020-06-18 NOTE — PLAN OF CARE
Patient participated in partial groups and a full MHM group. He was engaged in some discussion about cognitive distortions, although was misinterpreting information and expressing tangential ideas. He was able to redirect and respond to what others were saying.   MHM group focused on intention. He declined brief meditation on intention setting. He completed the associated task but did not use an intention word, instead used nick names. He reported positive reaction to this group.   He participated in project group but was in and out of group. He completed project he has been working on and followed suggestions and verbal instructions. He was pleased with final result.

## 2020-06-18 NOTE — PROGRESS NOTES
[facility], [place]   Psychiatric Progress Note  Hospital Day: 6   Telemedicine Visit: The patient's condition can be safely assessed and treated via synchronous audio and visual telemedicine encounter.      Start Time: 1248  Stop Time: 1251    Reason for Telemedicine Visit: Covid-19    Originating Site (Patient Location): [facility] 30    Distant Site (Provider Location): Provider Remote Setting    Consent:  The patient/guardian has verbally consented to: the potential risks and benefits of telemedicine (video visit) versus in person care; bill my insurance or make self-payment for services provided; and responsibility for payment of non-covered services.     Mode of Communication:  Video Conference via [employer]    As the provider I attest to compliance with applicable laws and regulations related to telemedicine.           Interim History:   The patient's care was discussed with the treatment team during the daily team meeting and/or staff's chart notes were reviewed.  Staff reports that patient continues to show poor insight at times, but at least he is willing to accept the medical treatments. He still denies that he is bipolar and doesn't appear to have intention to follow-up with mental health providers.     Upon interview, the patient is pleasant at first. He tells me that he doesn't like that I believe he has bipolar, but he accepts that we will just agree to disagree. He made paranoid statements about people having access to his records to know that he is bipolar. He wondered how the police had the believe that he had a mental illness if they weren't told ahead of time. Patient was unwilling to accept that his presentation with the police clued them into that. Patient is adamant that he will be asking to leave tomorrow AM. I said that I still didn't think he was ready.             Medications:       aspirin  81 mg Oral Daily     cyanocobalamin  1,000 mcg Oral  Daily     darbepoetin alpha non-ESRD  40 mcg Subcutaneous Once     ferrous sulfate  325 mg Oral Daily     insulin aspart   Subcutaneous TID w/meals     insulin degludec  22 Units Subcutaneous At Bedtime     lamiVUDine  100 mg Oral Daily     OLANZapine  10 mg Oral At Bedtime     polyethylene glycol  17 g Oral Daily     rosuvastatin  5 mg Oral Daily     tacrolimus  4 mg Oral Q12H     tamsulosin  0.4 mg Oral Daily     vitamin D3  2,000 Units Oral Daily          Allergies:     Allergies   Allergen Reactions     Codeine Other (See Comments)     Cannot take due to liver  Cannot tolerate oral narcotics     Seasonal Allergies      Sneezing, coughing, runny and itchy eyes          Labs:     Recent Results (from the past 48 hour(s))   Glucose by meter    Collection Time: 06/16/20  4:33 PM   Result Value Ref Range    Glucose 81 70 - 99 mg/dL   Glucose by meter    Collection Time: 06/16/20  5:04 PM   Result Value Ref Range    Glucose 99 70 - 99 mg/dL   Glucose by meter    Collection Time: 06/16/20  5:34 PM   Result Value Ref Range    Glucose 146 (H) 70 - 99 mg/dL   Glucose by meter    Collection Time: 06/16/20 10:11 PM   Result Value Ref Range    Glucose 208 (H) 70 - 99 mg/dL   Glucose by meter    Collection Time: 06/17/20  2:00 AM   Result Value Ref Range    Glucose 145 (H) 70 - 99 mg/dL   Glucose by meter    Collection Time: 06/17/20  6:48 AM   Result Value Ref Range    Glucose 84 70 - 99 mg/dL   Glucose by meter    Collection Time: 06/17/20  8:15 AM   Result Value Ref Range    Glucose 92 70 - 99 mg/dL   Glucose by meter    Collection Time: 06/17/20 10:36 AM   Result Value Ref Range    Glucose 94 70 - 99 mg/dL   Glucose by meter    Collection Time: 06/17/20 12:34 PM   Result Value Ref Range    Glucose 89 70 - 99 mg/dL   Glucose by meter    Collection Time: 06/17/20  3:10 PM   Result Value Ref Range    Glucose 97 70 - 99 mg/dL   Glucose by meter    Collection Time: 06/17/20  6:36 PM   Result Value Ref Range    Glucose 167 (H)  70 - 99 mg/dL   Glucose by meter    Collection Time: 06/18/20  1:29 AM   Result Value Ref Range    Glucose 157 (H) 70 - 99 mg/dL   Glucose by meter    Collection Time: 06/18/20  8:14 AM   Result Value Ref Range    Glucose 150 (H) 70 - 99 mg/dL   Glucose by meter    Collection Time: 06/18/20 10:54 AM   Result Value Ref Range    Glucose 281 (H) 70 - 99 mg/dL   Glucose by meter    Collection Time: 06/18/20 12:20 PM   Result Value Ref Range    Glucose 267 (H) 70 - 99 mg/dL          Psychiatric Examination:     /74   Pulse 97   Temp 97.5  F (36.4  C) (Oral)   Resp 16   Wt 69.7 kg (153 lb 9.6 oz)   SpO2 100%   BMI 22.68 kg/m    Weight is 153 lbs 9.6 oz  Body mass index is 22.68 kg/m .        Orthostatic Vitals       Most Recent      Sitting Orthostatic /72 06/17 0735    Sitting Orthostatic Pulse (bpm) 88 06/17 0735    Standing Orthostatic /74 06/18 0759    Standing Orthostatic Pulse (bpm) 101 06/18 0759            Appearance: awake, alert, adequately groomed and appears recorded age  Attitude:  cooperative  Eye Contact:  intense  Mood:  irritable  Affect:  appropriate and in normal range, mood congruent and reactive  Speech:  clear, coherent and mildly pressured, hyperverbal but interruptible  Language: fluent and intact in English  Psychomotor, Gait, Musculoskeletal:  no evidence of tardive dyskinesia, dystonia, or tics  Thought Process:  tangential  Associations:  no loose associations  Thought Content:  no evidence of suicidal ideation or homicidal ideation and paranoia and grandiose delusions present  Insight:  poor  Judgement:  poor,   Oriented to:  time, person, and place  Attention Span and Concentration:  fair  Recent and Remote Memory:  fair  Fund of Knowledge:  appropriate      Clinical Global Impressions  First:  Considering your total clinical experience with this particular patient population, how severe are the patient's symptoms at this time?: 7 (06/13/20 0819)  Compared to the  patient's condition at the START of treatment, this patient's condition is: 4 (06/13/20 0819)  Most recent:  Considering your total clinical experience with this particular patient population, how severe are the patient's symptoms at this time?: 7 (06/13/20 0819)  Compared to the patient's condition at the START of treatment, this patient's condition is: 4 (06/13/20 0819)             Precautions:     Behavioral Orders   Procedures     Assault precautions     Code 1 - Restrict to Unit     Elopement precautions     Routine Programming     As clinically indicated     Single Room     Status 15     Every 15 minutes.          Diagnoses:   Bipolar disorder type 1, manic, severe with psychosis  Status post orthotopic liver transplant 11/11/2019 secondary to ESLD related to ESTRADA, hepatocellular carcinoma  Type 2 diabetes, insulin-dependent, with retinopathy  Acute kidney injury on chronic kidney disease stage 3  Benign prostatic hypertrophy  Hyperlipidemia         Assessment & Plan:   Assessment and hospital summary:  is a 55-year-old male admitted to [address] on 6/11/2020, arriving on the unit 6/12/2020.  He was admitted on a 72-hour hold through the [facility] ER due to agitation and manic symptoms.  He was at the [facility] and asked someone to call 911 due to low BG.  He took a taxi to the ER.  He exhibited bizarre behaviors and tangential speech.  He has a history of multiple medical issues including liver transplant in 11/2019, chronic kidney disease and insulin-dependent diabetes.  Tacrolimus (immunosuppressant to reduce chances of rejection of transplanted liver) level was low upon admission and he reports missing some doses.  His transplant coordinator and friend have initiated multiple welfare checks. In the ER he was very agitated and was placed in restraints.  Upon admission to the unit he remained agitated and staff reported he had had poor boundaries and had been  touching staff and patients requiring redirection. Has not required any further use of restraints. IM was consulted on admission 2/2 medical complexity. PTA olanzapine was continued along with PRN clonazepam to target amber. Pt agreed to sign in voluntary 6/12 and increase olanzapine to 10mg to further target poor sleep and disorganization. He continues to report he does not have bipolar disorder and show limited insight into his mental health symptoms but has remained in the hospital due to his belief that he needs medical treatments.     Psychiatric treatment/inteventions:  Medications:   -continue PTA olanzapine at increased dose of 10mg at bedtime  -continue PTA clonazepam 1mg at bedtime PRN sleep     Laboratory/Imaging: no new labs per psychiatry, IM following as below     Patient will be treated in therapeutic milieu with appropriate individual and group therapies as described.     Medical treatment/interventions:  I have reviewed internal medicine recommendations in the note from 6/18. Agree with current plan.    Disposition Plan   Reason for ongoing admission: is unable to care for self due to severe psychosis or amber  Discharge location: home with self-care and in home nursing referral placed today  Discharge Medications: not ordered  Follow-up Appointments: not scheduled  Legal Status: voluntary, pt signed in 6/15  Entered by: Gustavo Harris on 6/18/2020 at 2:38 PM

## 2020-06-18 NOTE — PLAN OF CARE
"Pt out in common areas most of shift.  Pt attended and participated in all available groups.  Pt continues to have pressured, tangential rambling speech. Pt medication compliant. Cooperative with blood sugar checks and all diabetic cares. Pt reiterates he is here for a\" tune up\" specifically for diabetic cares.  Pt is agreeable to insulin changes made by internal medicine. Pt reports had a full night sleep though on 4 hours documented observed. Pt states is planning on requesting discharge tomorrow.   "

## 2020-06-18 NOTE — PROGRESS NOTES
"Frandy \"Miller\" was in the milieu for majority of the shift, he was socializing with his peers and staff. Miller seems a bit rambling speech with fighting ideas, also has pressured speech. Miller stated that he purchased $10,00 worth of [employer] stock, and he is planing to buy the unit beats headphone.  He spent most of his evening speaking to different peers and rambling about different things. Miller rosalinda all mental health symptoms including SI/SIB/HI and hallucination. He is ADL independent and had all his meal/snack. Miller is hoping to discharge tomorrow.        06/17/20 2220   Behavioral Health   Hallucinations denies / not responding to hallucinations   Thinking poor concentration   Orientation time: oriented;date: oriented;person: oriented;place: oriented   Memory baseline memory   Insight insight appropriate to situation;insight appropriate to events   Judgement impaired   Eye Contact at examiner   Affect full range affect   Mood mood is calm   Physical Appearance/Attire appears stated age   Hygiene well groomed   1. Wish to be Dead (Recent) No   2. Non-Specific Active Suicidal Thoughts (Recent) No   Activity hyperactive (agitated, impulsive)   Speech pressured;rambling;flight of ideas   Medication Sensitivity no stated side effects;no observed side effects   Psycho Education   Type of Intervention 1:1 intervention   Response participates, initiates socially appropriate   Hours 0.5   Activities of Daily Living   Hygiene/Grooming handwashing;shower;independent   Oral Hygiene independent   Dress street clothes;independent   Room Organization independent       "

## 2020-06-19 LAB
ERYTHROCYTE [DISTWIDTH] IN BLOOD BY AUTOMATED COUNT: 15.5 % (ref 10–15)
GLUCOSE BLDC GLUCOMTR-MCNC: 113 MG/DL (ref 70–99)
GLUCOSE BLDC GLUCOMTR-MCNC: 118 MG/DL (ref 70–99)
GLUCOSE BLDC GLUCOMTR-MCNC: 135 MG/DL (ref 70–99)
GLUCOSE BLDC GLUCOMTR-MCNC: 157 MG/DL (ref 70–99)
GLUCOSE BLDC GLUCOMTR-MCNC: 163 MG/DL (ref 70–99)
GLUCOSE BLDC GLUCOMTR-MCNC: 230 MG/DL (ref 70–99)
GLUCOSE BLDC GLUCOMTR-MCNC: 98 MG/DL (ref 70–99)
HCT VFR BLD AUTO: 28.7 % (ref 40–53)
HGB BLD-MCNC: 9.3 G/DL (ref 13.3–17.7)
MCH RBC QN AUTO: 33.6 PG (ref 26.5–33)
MCHC RBC AUTO-ENTMCNC: 32.4 G/DL (ref 31.5–36.5)
MCV RBC AUTO: 104 FL (ref 78–100)
PLATELET # BLD AUTO: 116 10E9/L (ref 150–450)
RBC # BLD AUTO: 2.77 10E12/L (ref 4.4–5.9)
WBC # BLD AUTO: 5.1 10E9/L (ref 4–11)

## 2020-06-19 PROCEDURE — G0177 OPPS/PHP; TRAIN & EDUC SERV: HCPCS

## 2020-06-19 PROCEDURE — 85027 COMPLETE CBC AUTOMATED: CPT | Performed by: PHYSICIAN ASSISTANT

## 2020-06-19 PROCEDURE — 12400001 ZZH R&B MH UMMC

## 2020-06-19 PROCEDURE — 99232 SBSQ HOSP IP/OBS MODERATE 35: CPT | Mod: 95 | Performed by: PSYCHIATRY & NEUROLOGY

## 2020-06-19 PROCEDURE — 00000146 ZZHCL STATISTIC GLUCOSE BY METER IP

## 2020-06-19 PROCEDURE — 25000132 ZZH RX MED GY IP 250 OP 250 PS 637: Mod: GY | Performed by: PHYSICIAN ASSISTANT

## 2020-06-19 PROCEDURE — 36415 COLL VENOUS BLD VENIPUNCTURE: CPT | Performed by: PHYSICIAN ASSISTANT

## 2020-06-19 PROCEDURE — 25000131 ZZH RX MED GY IP 250 OP 636 PS 637: Mod: GY | Performed by: PHYSICIAN ASSISTANT

## 2020-06-19 PROCEDURE — 25000132 ZZH RX MED GY IP 250 OP 250 PS 637: Mod: GY | Performed by: PSYCHIATRY & NEUROLOGY

## 2020-06-19 PROCEDURE — 25000128 H RX IP 250 OP 636: Performed by: PHYSICIAN ASSISTANT

## 2020-06-19 RX ADMIN — LAMIVUDINE 100 MG: 100 TABLET, FILM COATED ORAL at 08:21

## 2020-06-19 RX ADMIN — FERROUS SULFATE TAB 325 MG (65 MG ELEMENTAL FE) 325 MG: 325 (65 FE) TAB at 08:21

## 2020-06-19 RX ADMIN — CYANOCOBALAMIN TAB 1000 MCG 1000 MCG: 1000 TAB at 08:21

## 2020-06-19 RX ADMIN — OLANZAPINE 10 MG: 10 TABLET ORAL at 22:14

## 2020-06-19 RX ADMIN — INSULIN ASPART 3 UNITS: 100 INJECTION, SOLUTION INTRAVENOUS; SUBCUTANEOUS at 18:18

## 2020-06-19 RX ADMIN — TACROLIMUS 4 MG: 1 CAPSULE ORAL at 06:07

## 2020-06-19 RX ADMIN — DARBEPOETIN ALFA 40 MCG: 40 INJECTION, SOLUTION INTRAVENOUS; SUBCUTANEOUS at 10:58

## 2020-06-19 RX ADMIN — POLYETHYLENE GLYCOL 3350 17 G: 17 POWDER, FOR SOLUTION ORAL at 08:21

## 2020-06-19 RX ADMIN — ROSUVASTATIN CALCIUM 5 MG: 5 TABLET, FILM COATED ORAL at 08:21

## 2020-06-19 RX ADMIN — INSULIN ASPART 4 UNITS: 100 INJECTION, SOLUTION INTRAVENOUS; SUBCUTANEOUS at 08:52

## 2020-06-19 RX ADMIN — TACROLIMUS 4 MG: 1 CAPSULE ORAL at 18:51

## 2020-06-19 RX ADMIN — TAMSULOSIN HYDROCHLORIDE 0.4 MG: 0.4 CAPSULE ORAL at 08:21

## 2020-06-19 RX ADMIN — MELATONIN 2000 UNITS: at 08:21

## 2020-06-19 RX ADMIN — INSULIN ASPART 2 UNITS: 100 INJECTION, SOLUTION INTRAVENOUS; SUBCUTANEOUS at 13:09

## 2020-06-19 RX ADMIN — INSULIN DEGLUDEC INJECTION 22 UNITS: 100 INJECTION, SOLUTION SUBCUTANEOUS at 22:14

## 2020-06-19 RX ADMIN — ASPIRIN 81 MG: 81 TABLET ORAL at 08:21

## 2020-06-19 ASSESSMENT — ACTIVITIES OF DAILY LIVING (ADL)
HYGIENE/GROOMING: HANDWASHING;SHOWER;INDEPENDENT
ORAL_HYGIENE: INDEPENDENT
DRESS: SCRUBS (BEHAVIORAL HEALTH);INDEPENDENT
LAUNDRY: WITH SUPERVISION
ORAL_HYGIENE: INDEPENDENT
HYGIENE/GROOMING: INDEPENDENT
DRESS: SCRUBS (BEHAVIORAL HEALTH);INDEPENDENT
LAUNDRY: WITH SUPERVISION

## 2020-06-19 NOTE — PLAN OF CARE
Pt out in common areas most of shift.  Pt has attended and participated in all available groups. Pt was hoping to be discharged today, but agreed  to remain in the hospital over the weekend to continue to stabilize his blood sugars.  Hopes to be discharged Monday.  Pt continues to have pressured, rambling and tangential speech. Pt is very pleasant and social with peers and staff. On pt has requested to use computer to transfer money from a bank account to pay on a loan today as condition for agreement to stay over the weekend.  spoke with pt to assess appropriateness of transaction. Order place for staff to supervise 1 x bank transaction on line. Pt denies any mental health sx's. Pt continue to be restless,  hyper verbal, grandiose.

## 2020-06-19 NOTE — PROGRESS NOTES
Pt attended the last 10 minutes of psycho education group IMR Building Social Support. Pt was reported that he had missed 's announcement for group earlier so he was arriving late. He asked to be brought up to speed. Pt volunteered to read the last page out loud and shared some of his thoughts with the group. Pt had appropriate engagement with  and other group members.

## 2020-06-19 NOTE — PROGRESS NOTES
Brief Medicine Note  June 19, 2020    Medicine following BGs peripherally.      DMII with retinopathy. Most recent Hgb A1C 4.8 on 3/4. Recent virtual visit with Endocrine provider on 5/7/20. Last seen by ophthalmology 3/4/20. BGs have been labile, adjusting insulin accordingly - pt gets very upset if BG <100 as he feels symptomatic with this. -157 today.   - Keep tresiba at 22U qPM  - Cont novolog to 1U per 20g carb TID with meals  - BG TID ac, at bedtime, and 0200  - Hypoglycemia protocol  - Will need follow up with Ophthalmology on discharge (was supposed to f/u in April)     Anemia of chronic disease. Maintained on daily iron supplementation and aranesp 40mcg injections every 2 weeks as long as Hgb <11.5 (last on 6/3/2020). BL Hgb appears to be ~8-9. Was due for aranesp 6/17. Hgb 9.3 today which meets OP infusion protocol guidelines to give 40mcg if Hgb <11.5 and HOLD if SBP >180, dose adjustments to be made if Hgb increased more than 1 point in 2 weeks - which did not occur.  - Cont ferrous sulfate 325mg daily  - Give aranesp 40mcg subcutaneous today     Pilar Barnes PA-C

## 2020-06-19 NOTE — PLAN OF CARE
Patient participated in project group and topic group. Project group he had a personal project that he wanted to complete with water colors. He worked with focus and requested some songs, needing a cue about one song which was not group appropriate. He wanted to explain his project to writer, very layered and complex explanation of a timeline that he wants to go to [place] in 2021 to visit [place] and go to the beach.   He participated in topic group focused on positive self-imagery using acrostic poetry. He participated in group brainstorming activity, identifying several words starting with each letter of the alphabet, but needing many cues to focus on ones that are positive attributes. He completed structured task and appreciated group.

## 2020-06-19 NOTE — PROGRESS NOTES
06/18/20 2200   Art Therapy   Type of Intervention structured groups   Response participates with encouragement/ redirection   Hours 1   Treatment Detail    Art Therapy    Objective- Patients use art media, the creative process & resulting artwork to:explore feelings,reconcile emotions, gain self-awareness/ self esteem, manage behaviors, develop social skills, improve reality orientation, & reduce anxiety /depression.     Outcome- pt was hyperverbal and restless. He left the room several times and returned.  He spoke a lot about his health issues. He needed redirection when he started to talk about politics and sexuality and conversations that he showed poor boundaries with and other pts were becoming uncomfortable. He was redirectable. His collage was about where he had met his doctor for his liver transplant.

## 2020-06-19 NOTE — PROGRESS NOTES
Miller seemed to have a good night. Pt appears restless and was observed socializing with staff and peers, or pacing the hallway. Pt ate during mealtimes and attended group. Pt denies SI/SIB, but endorses anxiety about his blood test tomorrow because he is concerned he will not be able to discharge. Pt's mood is elated and grandiose, stating how wealthy and smart he is. Pt is aware of having a financial call hold and came to staff to confess that he made a phone call to try and transfer money, but was unable to do so. During check in Pt praised and condemned the [facility] and rambled on about several subjects, difficult for writer to even track. Pt is overall cooperative and very pleasant. No other concerns noted.     06/18/20 2010   Behavioral Health   Hallucinations denies / not responding to hallucinations   Thinking distractable   Orientation person: oriented;place: oriented   Memory baseline memory   Insight denial of illness;other (see comment)  (Denies having bipolar, thinks he has other diagnosis)   Judgement impaired   Eye Contact at examiner   Affect full range affect   Mood grandiose;elated   Physical Appearance/Attire attire appropriate to age and situation;neat   Hygiene well groomed   Suicidality other (see comments)  (Pt denies SI)   1. Wish to be Dead (Recent) No   2. Non-Specific Active Suicidal Thoughts (Recent) No   Self Injury other (see comment)  (Pt denies SIB)   Elopement Statements about wanting to leave   Activity restless   Speech rambling;other (see comments);clear  (Rambling during check in; clear otherwise)   Medication Sensitivity no stated side effects   Psychomotor / Gait balanced;steady   Psycho Education   Type of Intervention 1:1 intervention   Response participates, initiates socially appropriate   Hours 1   Treatment Detail Check in   Daily Care   Activity up ad karely   Patient Performed Hygiene dressed   Activities of Daily Living   Hygiene/Grooming independent   Oral Hygiene  independent   Dress scrubs (behavioral health);independent   Laundry with supervision   Room Organization independent   Activity   Activity Assistance Provided independent

## 2020-06-19 NOTE — PROGRESS NOTES
Work Completed:  Teamed on pt.  Provider to assess whether pt can leave, will agree to stay voluntarily or be placed on a hold.  Provider has convinced the pt to stay at least over the weekend.      Discharge plan or goal:   Home with services.      Barriers to discharge:   None

## 2020-06-19 NOTE — PLAN OF CARE
Writer sat with pt as he attempted to make his mortgage payment. Pt was unable to complete this transaction,  but was able to speak with mortgage worker who was able to note attempt at payment and defer any late fee til Tuesday. Pt was satisfied with this

## 2020-06-19 NOTE — PLAN OF CARE
Pt awake at 2400 and had sugar-free yogurt at this time,0230 bg resulted 230 down from hs result of 260,asymtomatic,fall precautions added for unsteadiness at times as a safety measure,will awaken for 0600 med and assess status

## 2020-06-19 NOTE — PROGRESS NOTES
[facility], [place]   Psychiatric Progress Note  Hospital Day: 7   Telemedicine Visit: The patient's condition can be safely assessed and treated via synchronous audio and visual telemedicine encounter.      Start Time: 1155  Stop Time: 1209    Reason for Telemedicine Visit: Covid-19    Originating Site (Patient Location): [place] [id number]    Distant Site (Provider Location): Provider Remote Setting    Consent:  The patient/guardian has verbally consented to: the potential risks and benefits of telemedicine (video visit) versus in person care; bill my insurance or make self-payment for services provided; and responsibility for payment of non-covered services.     Mode of Communication:  Video Conference via [employer]    As the provider I attest to compliance with applicable laws and regulations related to telemedicine.           Interim History:   The patient's care was discussed with the treatment team during the daily team meeting and/or staff's chart notes were reviewed.  Staff reports that patient continues to be grandiose. He continues to try to make financial transactions. He lacks insight. He is cooperative with insulin treatment, however blood sugars remain labile.    Upon interview, the patient still denied need for hospitalization from a mental health standpoint, but did recognize that his medical issues required ongoing stabilizing. He said that he was in default on a loan and couldn't stay if we didn't allow him to make a loan payment. He agreed to speak with the RN manager about this to see if a one time exception from his restriction was warranted.             Medications:       aspirin  81 mg Oral Daily     cyanocobalamin  1,000 mcg Oral Daily     darbepoetin alpha non-ESRD  40 mcg Subcutaneous Once     ferrous sulfate  325 mg Oral Daily     insulin aspart   Subcutaneous TID w/meals     insulin degludec  24 Units Subcutaneous At Bedtime     lamiVUDine  100  mg Oral Daily     OLANZapine  10 mg Oral At Bedtime     polyethylene glycol  17 g Oral Daily     rosuvastatin  5 mg Oral Daily     tacrolimus  4 mg Oral Q12H     tamsulosin  0.4 mg Oral Daily     vitamin D3  2,000 Units Oral Daily          Allergies:     Allergies   Allergen Reactions     Codeine Other (See Comments)     Cannot take due to liver  Cannot tolerate oral narcotics     Seasonal Allergies      Sneezing, coughing, runny and itchy eyes          Labs:     Recent Results (from the past 48 hour(s))   Glucose by meter    Collection Time: 06/17/20 12:34 PM   Result Value Ref Range    Glucose 89 70 - 99 mg/dL   Glucose by meter    Collection Time: 06/17/20  3:10 PM   Result Value Ref Range    Glucose 97 70 - 99 mg/dL   Glucose by meter    Collection Time: 06/17/20  6:36 PM   Result Value Ref Range    Glucose 167 (H) 70 - 99 mg/dL   Glucose by meter    Collection Time: 06/18/20  1:29 AM   Result Value Ref Range    Glucose 157 (H) 70 - 99 mg/dL   Glucose by meter    Collection Time: 06/18/20  8:14 AM   Result Value Ref Range    Glucose 150 (H) 70 - 99 mg/dL   Glucose by meter    Collection Time: 06/18/20 10:54 AM   Result Value Ref Range    Glucose 281 (H) 70 - 99 mg/dL   Glucose by meter    Collection Time: 06/18/20 12:20 PM   Result Value Ref Range    Glucose 267 (H) 70 - 99 mg/dL   Glucose by meter    Collection Time: 06/18/20  3:06 PM   Result Value Ref Range    Glucose 212 (H) 70 - 99 mg/dL   Glucose by meter    Collection Time: 06/18/20  5:40 PM   Result Value Ref Range    Glucose 174 (H) 70 - 99 mg/dL   Glucose by meter    Collection Time: 06/18/20  9:13 PM   Result Value Ref Range    Glucose 232 (H) 70 - 99 mg/dL   Glucose by meter    Collection Time: 06/18/20 10:16 PM   Result Value Ref Range    Glucose 260 (H) 70 - 99 mg/dL   Glucose by meter    Collection Time: 06/19/20  2:58 AM   Result Value Ref Range    Glucose 230 (H) 70 - 99 mg/dL   CBC with platelets    Collection Time: 06/19/20  7:51 AM   Result  Value Ref Range    WBC 5.1 4.0 - 11.0 10e9/L    RBC Count 2.77 (L) 4.4 - 5.9 10e12/L    Hemoglobin 9.3 (L) 13.3 - 17.7 g/dL    Hematocrit 28.7 (L) 40.0 - 53.0 %     (H) 78 - 100 fl    MCH 33.6 (H) 26.5 - 33.0 pg    MCHC 32.4 31.5 - 36.5 g/dL    RDW 15.5 (H) 10.0 - 15.0 %    Platelet Count 116 (L) 150 - 450 10e9/L   Glucose by meter    Collection Time: 06/19/20  8:12 AM   Result Value Ref Range    Glucose 157 (H) 70 - 99 mg/dL          Psychiatric Examination:     /59 (BP Location: Left arm)   Pulse 98   Temp 98.2  F (36.8  C) (Oral)   Resp 16   Wt 69.7 kg (153 lb 9.6 oz)   SpO2 100%   BMI 22.68 kg/m    Weight is 153 lbs 9.6 oz  Body mass index is 22.68 kg/m .      Orthostatic Vitals       Most Recent      Standing Orthostatic /72 06/19 0830    Standing Orthostatic Pulse (bpm) 98 06/19 0830              Appearance: awake, alert, adequately groomed and appears recorded age  Attitude:  cooperative  Eye Contact:  good  Mood:  better  Affect:  appropriate and in normal range, mood congruent and reactive  Speech:  clear, coherent and mildly pressured, hyperverbal but interruptible  Language: fluent and intact in English  Psychomotor, Gait, Musculoskeletal:  no evidence of tardive dyskinesia, dystonia, or tics  Thought Process:  tangential  Associations:  no loose associations  Thought Content:  no evidence of suicidal ideation or homicidal ideation and paranoia and grandiose delusions present  Insight:  poor  Judgement:  poor,   Oriented to:  time, person, and place  Attention Span and Concentration:  fair  Recent and Remote Memory:  fair  Fund of Knowledge:  appropriate      Clinical Global Impressions  First:  Considering your total clinical experience with this particular patient population, how severe are the patient's symptoms at this time?: 7 (06/13/20 0819)  Compared to the patient's condition at the START of treatment, this patient's condition is: 4 (06/13/20 0819)  Most  recent:  Considering your total clinical experience with this particular patient population, how severe are the patient's symptoms at this time?: 7 (06/13/20 0819)  Compared to the patient's condition at the START of treatment, this patient's condition is: 4 (06/13/20 0819)             Precautions:     Behavioral Orders   Procedures     Assault precautions     Code 1 - Restrict to Unit     Elopement precautions     Fall precautions     Routine Programming     As clinically indicated     Single Room     Status 15     Every 15 minutes.          Diagnoses:   Bipolar disorder type 1, manic, severe with psychosis  Status post orthotopic liver transplant 11/11/2019 secondary to ESLD related to ESTRADA, hepatocellular carcinoma  Type 2 diabetes, insulin-dependent, with retinopathy  Acute kidney injury on chronic kidney disease stage 3  Benign prostatic hypertrophy  Hyperlipidemia         Assessment & Plan:   Assessment and hospital summary:  is a 55-year-old male admitted to [address] on 6/11/2020, arriving on the unit 6/12/2020.  He was admitted on a 72-hour hold through the [facility] ER due to agitation and manic symptoms.  He was at the [facility] and asked someone to call 911 due to low BG.  He took a taxi to the ER.  He exhibited bizarre behaviors and tangential speech.  He has a history of multiple medical issues including liver transplant in 11/2019, chronic kidney disease and insulin-dependent diabetes.  Tacrolimus (immunosuppressant to reduce chances of rejection of transplanted liver) level was low upon admission and he reports missing some doses.  His transplant coordinator and friend have initiated multiple welfare checks. In the ER he was very agitated and was placed in restraints.  Upon admission to the unit he remained agitated and staff reported he had had poor boundaries and had been touching staff and patients requiring redirection. Has not required any further use  of restraints. IM was consulted on admission 2/2 medical complexity. PTA olanzapine was continued along with PRN clonazepam to target amber. Pt agreed to sign in voluntary 6/12 and increase olanzapine to 10mg to further target poor sleep and disorganization. He continues to report he does not have bipolar disorder and show limited insight into his mental health symptoms but has remained in the hospital due to his belief that he needs medical treatments.     Psychiatric treatment/inteventions:  Medications:   -continue PTA olanzapine at increased dose of 10mg at bedtime  -continue PTA clonazepam 1mg at bedtime PRN sleep     Laboratory/Imaging: no new labs per psychiatry, IM following as below     Patient will be treated in therapeutic milieu with appropriate individual and group therapies as described.     Medical treatment/interventions:  I have reviewed internal medicine recommendations in the note from 6/18. Agree with current plan.    Placed patient care order to allow patient to make one time payment on his loan that he reports is in default. This will occur under staff supervision. Patient met with nurse manager to discuss this to determine it was appropriate.    Disposition Plan   Reason for ongoing admission: is unable to care for self due to severe psychosis or amber  Discharge location: home with self-care and in home nursing referral placed today  Discharge Medications: not ordered  Follow-up Appointments: not scheduled  Legal Status: voluntary, pt signed in 6/15  Entered by: Gustavo Harris on 6/19/2020 at 10:48 AM

## 2020-06-20 LAB
GLUCOSE BLDC GLUCOMTR-MCNC: 112 MG/DL (ref 70–99)
GLUCOSE BLDC GLUCOMTR-MCNC: 116 MG/DL (ref 70–99)
GLUCOSE BLDC GLUCOMTR-MCNC: 130 MG/DL (ref 70–99)
GLUCOSE BLDC GLUCOMTR-MCNC: 152 MG/DL (ref 70–99)
GLUCOSE BLDC GLUCOMTR-MCNC: 163 MG/DL (ref 70–99)
GLUCOSE BLDC GLUCOMTR-MCNC: 172 MG/DL (ref 70–99)
GLUCOSE BLDC GLUCOMTR-MCNC: 174 MG/DL (ref 70–99)
GLUCOSE BLDC GLUCOMTR-MCNC: 202 MG/DL (ref 70–99)

## 2020-06-20 PROCEDURE — 25000132 ZZH RX MED GY IP 250 OP 250 PS 637: Mod: GY | Performed by: PHYSICIAN ASSISTANT

## 2020-06-20 PROCEDURE — 12400001 ZZH R&B MH UMMC

## 2020-06-20 PROCEDURE — 00000146 ZZHCL STATISTIC GLUCOSE BY METER IP

## 2020-06-20 PROCEDURE — 25000132 ZZH RX MED GY IP 250 OP 250 PS 637: Mod: GY | Performed by: PSYCHIATRY & NEUROLOGY

## 2020-06-20 PROCEDURE — 25000131 ZZH RX MED GY IP 250 OP 636 PS 637: Mod: GY | Performed by: PHYSICIAN ASSISTANT

## 2020-06-20 PROCEDURE — H2032 ACTIVITY THERAPY, PER 15 MIN: HCPCS

## 2020-06-20 RX ADMIN — POLYETHYLENE GLYCOL 3350 17 G: 17 POWDER, FOR SOLUTION ORAL at 08:13

## 2020-06-20 RX ADMIN — ROSUVASTATIN CALCIUM 5 MG: 5 TABLET, FILM COATED ORAL at 08:13

## 2020-06-20 RX ADMIN — INSULIN DEGLUDEC INJECTION 22 UNITS: 100 INJECTION, SOLUTION SUBCUTANEOUS at 22:08

## 2020-06-20 RX ADMIN — ASPIRIN 81 MG: 81 TABLET ORAL at 08:13

## 2020-06-20 RX ADMIN — LAMIVUDINE 100 MG: 100 TABLET, FILM COATED ORAL at 08:13

## 2020-06-20 RX ADMIN — MELATONIN 2000 UNITS: at 08:13

## 2020-06-20 RX ADMIN — CYANOCOBALAMIN TAB 1000 MCG 1000 MCG: 1000 TAB at 08:13

## 2020-06-20 RX ADMIN — FERROUS SULFATE TAB 325 MG (65 MG ELEMENTAL FE) 325 MG: 325 (65 FE) TAB at 08:13

## 2020-06-20 RX ADMIN — ACETAMINOPHEN 650 MG: 325 TABLET, FILM COATED ORAL at 19:02

## 2020-06-20 RX ADMIN — INSULIN ASPART 4 UNITS: 100 INJECTION, SOLUTION INTRAVENOUS; SUBCUTANEOUS at 08:17

## 2020-06-20 RX ADMIN — INSULIN ASPART 3 UNITS: 100 INJECTION, SOLUTION INTRAVENOUS; SUBCUTANEOUS at 18:11

## 2020-06-20 RX ADMIN — OLANZAPINE 10 MG: 10 TABLET ORAL at 22:09

## 2020-06-20 RX ADMIN — TACROLIMUS 4 MG: 1 CAPSULE ORAL at 06:51

## 2020-06-20 RX ADMIN — CLONAZEPAM 1 MG: 0.5 TABLET ORAL at 23:04

## 2020-06-20 RX ADMIN — TACROLIMUS 4 MG: 1 CAPSULE ORAL at 17:44

## 2020-06-20 RX ADMIN — TAMSULOSIN HYDROCHLORIDE 0.4 MG: 0.4 CAPSULE ORAL at 08:13

## 2020-06-20 RX ADMIN — INSULIN ASPART 3 UNITS: 100 INJECTION, SOLUTION INTRAVENOUS; SUBCUTANEOUS at 13:06

## 2020-06-20 ASSESSMENT — ACTIVITIES OF DAILY LIVING (ADL)
HYGIENE/GROOMING: INDEPENDENT
DRESS: INDEPENDENT;SCRUBS (BEHAVIORAL HEALTH)
ORAL_HYGIENE: INDEPENDENT

## 2020-06-20 NOTE — PROGRESS NOTES
"Miller appeared to be in a good mood today, \"I'm doing great!\". Pt continues to display a bright, full range of affect, and was sociable with staff and peers. Pt denies SI/SIB and all MH symptoms, instead made medically related concerns about his blood glucose levels, etc. Pt stated he was telling his aunt to send cookies, cakes, etc. Staff encouraged Pt to not tell family members to do so, as staff cannot accept such items. Pt is cooperative and compliant. Pt speaks of himself in a grandiose manner, as in \"I'm going to buy the unit [employer] headphones, don't even worry about it. It's done, I invested $10,000\". Pt is staying through the weekend and appears content with his hospital stay thus far. No other concerns noted.     06/19/20 2136   Behavioral Health   Hallucinations denies / not responding to hallucinations   Thinking distractable;poor concentration   Orientation person: oriented;place: oriented   Memory baseline memory   Insight poor   Judgement impaired   Eye Contact at examiner   Affect full range affect   Mood grandiose;elated   Physical Appearance/Attire attire appropriate to age and situation   Hygiene well groomed   Suicidality other (see comments)  (Pt denies SI)   1. Wish to be Dead (Recent) No   2. Non-Specific Active Suicidal Thoughts (Recent) No   Self Injury other (see comment)  (Pt denies SIB)   Elopement   (No observed behaviors/statements of concern)   Activity restless   Speech tangential;rambling;pressured   Medication Sensitivity no stated side effects   Psychomotor / Gait steady;balanced   Psycho Education   Type of Intervention 1:1 intervention   Response participates, initiates socially appropriate   Hours 0.5   Treatment Detail Check in   Daily Care   Activity up ad karely   Patient Performed Hygiene dressed   Activities of Daily Living   Hygiene/Grooming independent   Oral Hygiene independent   Dress scrubs (behavioral health);independent   Laundry with supervision   Room Organization " independent   Activity   Activity Assistance Provided independent

## 2020-06-20 NOTE — PROGRESS NOTES
Brief Medicine Note  June 20, 2020    Frandy Workman is a 55 year old male w/ a pmhx of ESTRADA cirrhosis c/b ascites, HE and HCC s/p liver transplant 11/11/19, HTN, HLD, DMII, GERD, BPH and CKD who was admitted to [facility] due to decompensated psychiatric illness.    Medicine following BG peripherally.     DMII with retinopathy. Most recent Hgb A1C 4.8 on 3/4. Recent virtual visit with Endocrine provider on 5/7/20. Last seen by ophthalmology 3/4/20. BGs have been labile, adjusting insulin accordingly - pt gets very upset if BG <100 as he feels symptomatic with this. His BGs have been stable 98-160s over past 48h on current regimen. Recommend he continue this regimen while inpatient and on discharge. Per d/w previous discussion with transplant coordinator, patient has been very capable and compliant with carb counting when his amber is under control.   - Cont tresiba 22U qPM  - Cont novolog to 1U per 20g carb TID with meals  - Cont to monitor BG QID and QHS  - Will need follow up with Ophthalmology on discharge (was supposed to f/u in April)  - Notify medicine if BG persistently >250    S/p orthotopic liver transplant (11/11/19) 2/2 ESLD r/t ESTRADA, HCC. Follows with Dr. Banuelos of hepatology, most recently seen via virtual visit on 5/26/20. LFT's normal. Renal function stable. Seen by Oncology 5/26/20 and no evidence of recurrent HCC on recent imagining. It appears patient not compliant with meds pta for only a few days while acutely manic - thi was discussed/relayed to his transplant team.   - Immunosuppression: tacro 4mg q12h, goal 6-8, 11 hour trough 5.8 6/16 which was d/w transplant coordinator - plan to continue current dose and follow up with transplant on discharge  - Continue lamivudine for ppx  - Follow up with Hepatology in 3 months as previously directed    JERONIMO on CKD stage III. Resolved. 2/2 diabetic nephropathy and tacrolimus therapy s/p transplant. Bl Cr ~ 1.5, 1.7 on admission s/p 1L LR bolus in ED  as pt appeared dry on admission. Cr improved to BL of 1.44 on 6/13.  Last seen by nephrology 3/2/20.  - Per nephrology, prefer higher blood pressures to ensure adequate renal perfusion, not on any BP meds  - Continue daily iron supplementation and aranesp q2 weeks per OP infusion therapy protocol (given inpatient on 6/19 as pt met parameters) for anemia of CKD  - Avoid nephrotoxins  - Is due for follow up with nephrology on discharge      Medicine will sign off. Please page with any questions or concerns.     Pilar Barnes PA-C

## 2020-06-20 NOTE — PLAN OF CARE
Involved in all unit activities. Displays good concentration during group. Engaged verbally with staff and peers all shift. Fewer grandiose statements. Misinterprets some group content. Responsible with his diabetic care. Pleasant,calm mood. Good ADL's.

## 2020-06-21 LAB
GLUCOSE BLDC GLUCOMTR-MCNC: 109 MG/DL (ref 70–99)
GLUCOSE BLDC GLUCOMTR-MCNC: 110 MG/DL (ref 70–99)
GLUCOSE BLDC GLUCOMTR-MCNC: 122 MG/DL (ref 70–99)
GLUCOSE BLDC GLUCOMTR-MCNC: 135 MG/DL (ref 70–99)
GLUCOSE BLDC GLUCOMTR-MCNC: 145 MG/DL (ref 70–99)
GLUCOSE BLDC GLUCOMTR-MCNC: 161 MG/DL (ref 70–99)
GLUCOSE BLDC GLUCOMTR-MCNC: 165 MG/DL (ref 70–99)

## 2020-06-21 PROCEDURE — 25000132 ZZH RX MED GY IP 250 OP 250 PS 637: Mod: GY | Performed by: PHYSICIAN ASSISTANT

## 2020-06-21 PROCEDURE — 25000132 ZZH RX MED GY IP 250 OP 250 PS 637: Mod: GY | Performed by: PSYCHIATRY & NEUROLOGY

## 2020-06-21 PROCEDURE — 25000131 ZZH RX MED GY IP 250 OP 636 PS 637: Mod: GY | Performed by: PHYSICIAN ASSISTANT

## 2020-06-21 PROCEDURE — 00000146 ZZHCL STATISTIC GLUCOSE BY METER IP

## 2020-06-21 PROCEDURE — 12400001 ZZH R&B MH UMMC

## 2020-06-21 RX ADMIN — INSULIN DEGLUDEC INJECTION 22 UNITS: 100 INJECTION, SOLUTION SUBCUTANEOUS at 21:37

## 2020-06-21 RX ADMIN — INSULIN ASPART 3 UNITS: 100 INJECTION, SOLUTION INTRAVENOUS; SUBCUTANEOUS at 12:43

## 2020-06-21 RX ADMIN — TACROLIMUS 4 MG: 1 CAPSULE ORAL at 06:10

## 2020-06-21 RX ADMIN — ROSUVASTATIN CALCIUM 5 MG: 5 TABLET, FILM COATED ORAL at 08:48

## 2020-06-21 RX ADMIN — CYANOCOBALAMIN TAB 1000 MCG 1000 MCG: 1000 TAB at 08:48

## 2020-06-21 RX ADMIN — ASPIRIN 81 MG: 81 TABLET ORAL at 08:47

## 2020-06-21 RX ADMIN — FERROUS SULFATE TAB 325 MG (65 MG ELEMENTAL FE) 325 MG: 325 (65 FE) TAB at 08:48

## 2020-06-21 RX ADMIN — POLYETHYLENE GLYCOL 3350 17 G: 17 POWDER, FOR SOLUTION ORAL at 08:48

## 2020-06-21 RX ADMIN — INSULIN ASPART 4 UNITS: 100 INJECTION, SOLUTION INTRAVENOUS; SUBCUTANEOUS at 18:06

## 2020-06-21 RX ADMIN — OLANZAPINE 10 MG: 10 TABLET ORAL at 21:37

## 2020-06-21 RX ADMIN — TACROLIMUS 4 MG: 1 CAPSULE ORAL at 17:37

## 2020-06-21 RX ADMIN — TAMSULOSIN HYDROCHLORIDE 0.4 MG: 0.4 CAPSULE ORAL at 08:48

## 2020-06-21 RX ADMIN — INSULIN ASPART 3 UNITS: 100 INJECTION, SOLUTION INTRAVENOUS; SUBCUTANEOUS at 08:48

## 2020-06-21 RX ADMIN — MELATONIN 2000 UNITS: at 08:48

## 2020-06-21 RX ADMIN — LAMIVUDINE 100 MG: 100 TABLET, FILM COATED ORAL at 08:47

## 2020-06-21 ASSESSMENT — ACTIVITIES OF DAILY LIVING (ADL)
LAUNDRY: UNABLE TO COMPLETE
ORAL_HYGIENE: INDEPENDENT
DRESS: INDEPENDENT
HYGIENE/GROOMING: INDEPENDENT
LAUNDRY: WITH SUPERVISION
HYGIENE/GROOMING: INDEPENDENT
ORAL_HYGIENE: INDEPENDENT
DRESS: STREET CLOTHES;INDEPENDENT

## 2020-06-21 NOTE — PROGRESS NOTES
"Pt attended a recreation group (board game) he had never played. Once the game was explained,Pt began to overpower the group telling pts which cards were good and which were not. Pt was redirected and stopped for a bit then continued. This writer sat next to a young man whom needed a bit of help with what several words were. Pt asked him, \"how long have you been in this [place], did you go to school here?\" Although this pt did not appear to understand why he was asking that question, pt was redirected. One pt even left the group and stated, \" I am leaving because of Miller. Other patients appeared annoyed and upset with his constant interruption andtelling others how to play the game. It appeared to this writer this is a personality trait, pt continued to be grandiose talking about himself as it related to each card. At that point other pts began to redirect them and the game ended shortly after.    "

## 2020-06-21 NOTE — PLAN OF CARE
Pt has awakened intermittently, pt seems more confused immediately following awakening,asks where staff went while in their view and when bg would be measured shortly after treatment was completed  ,,03 bg [id number],pt c/o discomfort right side area,warm pack given per pt request with some relief of discomfort at this time,will assess upon next awakening

## 2020-06-21 NOTE — PROGRESS NOTES
Pt was in a calm mood for most of the evening, though he was irritable at times. He spent his time listening to music, watching movies, and socializing with peers. He took a shower and ate his dinner. He was hyper-verbal and tangential at times. He was upset for a brief period of time due to not receiving the food that he wanted. He feels like his medical needs aren't being met properly here in terms of managing his blood sugar and his liver disease. He feels like he doesn't need to be here and he'd like to leave soon. He denies any SI, SIB, or hallucinations. He hopes to leave on Monday.      06/20/20 1857   Behavioral Health   Hallucinations denies / not responding to hallucinations   Thinking poor concentration   Orientation person: oriented;place: oriented;date: oriented   Memory baseline memory   Insight poor   Judgement impaired   Eye Contact at examiner   Affect full range affect;irritable   Mood mood is calm;irritable   Physical Appearance/Attire appears stated age;attire appropriate to age and situation   Hygiene well groomed   Suicidality other (see comments)  (denies)   1. Wish to be Dead (Recent) No   2. Non-Specific Active Suicidal Thoughts (Recent) No   Self Injury other (see comment)  (denies)   Elopement   (none)   Activity other (see comment)  (visible in milieu)   Speech tangential;clear;coherent   Medication Sensitivity no stated side effects;no observed side effects   Psychomotor / Gait balanced;steady   Activities of Daily Living   Hygiene/Grooming independent   Oral Hygiene independent   Dress independent;scrubs (behavioral health)   Room Organization independent

## 2020-06-21 NOTE — PROGRESS NOTES
"Pt was in the milieu.  Was loud, but redirectable.  Talked in very elaborately about buying businesses + investing in a peers business.  \"I know you, I trust you.  I will invest whatever I can into our future.\"  Pt denies anx, dep, SI, SIB.  Pt said, \"I feel ready to go  I will wait until the Dr. Tells me I'm fit to go.\"  "

## 2020-06-21 NOTE — PROGRESS NOTES
06/20/20 1900   Therapeutic Recreation   Type of Intervention structured groups   Activity game   Response Participates with cues/redirection   Hours 0.5     Pt participated in Therapeutic Recreation group with focus on leisure participation, social engagement, and critical thinking. Engaged and focused in the group recreational activity via a group game.  Pt was a full participant throughout the entire duration of group. At times during the discussion, pt tried to direct who got to speak and when. Pt also would occasionally interrupt others in the discussion and in the activity, not giving adequate time to think or guess during their turns. Pt seemed very engaged in the group, but might have misunderstood that it was an activity therapy group. Pt kept asking about a separate association, in which a logo of a [place] [employer] was on this writer's folder. Throughout the group, pt was hyper-verbal and often grandiose with his interactions. Though, he was a supportive participant, often encouraging another patient during his turn.

## 2020-06-22 LAB
GLUCOSE BLDC GLUCOMTR-MCNC: 103 MG/DL (ref 70–99)
GLUCOSE BLDC GLUCOMTR-MCNC: 111 MG/DL (ref 70–99)
GLUCOSE BLDC GLUCOMTR-MCNC: 136 MG/DL (ref 70–99)
GLUCOSE BLDC GLUCOMTR-MCNC: 145 MG/DL (ref 70–99)
GLUCOSE BLDC GLUCOMTR-MCNC: 166 MG/DL (ref 70–99)
GLUCOSE BLDC GLUCOMTR-MCNC: 186 MG/DL (ref 70–99)
GLUCOSE BLDC GLUCOMTR-MCNC: 202 MG/DL (ref 70–99)

## 2020-06-22 PROCEDURE — 25000131 ZZH RX MED GY IP 250 OP 636 PS 637: Mod: GY | Performed by: PHYSICIAN ASSISTANT

## 2020-06-22 PROCEDURE — 99232 SBSQ HOSP IP/OBS MODERATE 35: CPT | Mod: 95 | Performed by: PSYCHIATRY & NEUROLOGY

## 2020-06-22 PROCEDURE — 25000132 ZZH RX MED GY IP 250 OP 250 PS 637: Mod: GY | Performed by: PHYSICIAN ASSISTANT

## 2020-06-22 PROCEDURE — 00000146 ZZHCL STATISTIC GLUCOSE BY METER IP

## 2020-06-22 PROCEDURE — 90853 GROUP PSYCHOTHERAPY: CPT

## 2020-06-22 PROCEDURE — G0177 OPPS/PHP; TRAIN & EDUC SERV: HCPCS

## 2020-06-22 PROCEDURE — 12400001 ZZH R&B MH UMMC

## 2020-06-22 PROCEDURE — 25000132 ZZH RX MED GY IP 250 OP 250 PS 637: Mod: GY | Performed by: PSYCHIATRY & NEUROLOGY

## 2020-06-22 RX ORDER — OLANZAPINE 2.5 MG/1
2.5 TABLET, FILM COATED ORAL DAILY
Status: DISCONTINUED | OUTPATIENT
Start: 2020-06-23 | End: 2020-06-23

## 2020-06-22 RX ADMIN — INSULIN DEGLUDEC INJECTION 22 UNITS: 100 INJECTION, SOLUTION SUBCUTANEOUS at 22:04

## 2020-06-22 RX ADMIN — TAMSULOSIN HYDROCHLORIDE 0.4 MG: 0.4 CAPSULE ORAL at 07:57

## 2020-06-22 RX ADMIN — INSULIN ASPART 3 UNITS: 100 INJECTION, SOLUTION INTRAVENOUS; SUBCUTANEOUS at 18:27

## 2020-06-22 RX ADMIN — MELATONIN 2000 UNITS: at 07:57

## 2020-06-22 RX ADMIN — ACETAMINOPHEN 650 MG: 325 TABLET, FILM COATED ORAL at 13:52

## 2020-06-22 RX ADMIN — HYDROXYZINE HYDROCHLORIDE 25 MG: 25 TABLET, FILM COATED ORAL at 23:57

## 2020-06-22 RX ADMIN — POLYETHYLENE GLYCOL 3350 17 G: 17 POWDER, FOR SOLUTION ORAL at 07:57

## 2020-06-22 RX ADMIN — INSULIN ASPART 3 UNITS: 100 INJECTION, SOLUTION INTRAVENOUS; SUBCUTANEOUS at 12:55

## 2020-06-22 RX ADMIN — LAMIVUDINE 100 MG: 100 TABLET, FILM COATED ORAL at 07:57

## 2020-06-22 RX ADMIN — TACROLIMUS 4 MG: 1 CAPSULE ORAL at 17:59

## 2020-06-22 RX ADMIN — INSULIN ASPART 5 UNITS: 100 INJECTION, SOLUTION INTRAVENOUS; SUBCUTANEOUS at 08:46

## 2020-06-22 RX ADMIN — OLANZAPINE 15 MG: 10 TABLET, FILM COATED ORAL at 22:04

## 2020-06-22 RX ADMIN — FERROUS SULFATE TAB 325 MG (65 MG ELEMENTAL FE) 325 MG: 325 (65 FE) TAB at 07:57

## 2020-06-22 RX ADMIN — TACROLIMUS 4 MG: 1 CAPSULE ORAL at 05:24

## 2020-06-22 RX ADMIN — ROSUVASTATIN CALCIUM 5 MG: 5 TABLET, FILM COATED ORAL at 07:57

## 2020-06-22 RX ADMIN — CYANOCOBALAMIN TAB 1000 MCG 1000 MCG: 1000 TAB at 07:57

## 2020-06-22 RX ADMIN — ASPIRIN 81 MG: 81 TABLET ORAL at 07:57

## 2020-06-22 ASSESSMENT — ACTIVITIES OF DAILY LIVING (ADL)
HYGIENE/GROOMING: INDEPENDENT
ORAL_HYGIENE: INDEPENDENT
LAUNDRY: WITH SUPERVISION
DRESS: SCRUBS (BEHAVIORAL HEALTH)

## 2020-06-22 NOTE — PROGRESS NOTES
Work Completed:  Reviewed notes. Areas of concern: Pt appears to be hypomanic - hyperverbal and grandiose..    Pt agrees to stay here for continued treatment. Pt's support staff continues to call the unit.    Discharge plan or goal:   Home with services      Barriers to discharge:   None

## 2020-06-22 NOTE — PROGRESS NOTES
"CLINICAL NUTRITION SERVICES - REASSESSMENT NOTE     Nutrition Prescription    RECOMMENDATIONS FOR MDs/PROVIDERS TO ORDER:  None today    Malnutrition Status:    Unable to determine    Recommendations already ordered by Registered Dietitian (RD):  Discontinue double portions  2x whole milk TID with meals    Future/Additional Recommendations:  Continue to adjust snacks per pt request     Received Patient/Family Request - Pt requesting to speak with nutrition about his meal orders and portion sizes.    Unable to obtain in-person nutrition history or nutrition focused physical assessment (NFPA) from patient as the number of staff going into rooms is restricted to limit pt exposure during this time. Information obtained via chart review and speaking with pt via phone.     EVALUATION OF THE PROGRESS TOWARD GOALS   Diet: Regular (double portions), HS snack: greek yogurt and cereal/granola  Intake: pt reports he is eating well but does not want double portions.        NEW FINDINGS   - Per PA note, \"patient has been very capable and compliant with carb counting when his amber is under control.\"  - Pt has gained 4 lbs since admission as he lost 4 lbs (2.5%) over the last 1 month, overall losing 9 lbs over the last 6 months.  06/21/20 : 69.4 kg (153 lb)  06/18/20 : 69.7 kg (153 lb 9.6 oz)   06/16/20 : 70.3 kg (155 lb)   06/14/20 : 67.5 kg (148 lb 12.8 oz)   06/03/20 : 67.2 kg (148 lb 1.6 oz)  05/26/20 : 71.1 kg (156 lb 12.8 oz)  05/21/20 : 71.2 kg (156 lb 14.4 oz)  12/18/19 : 73.6 kg (162 lb 4.8 oz)    MALNUTRITION  % Intake: No decreased intake noted  % Weight Loss: Weight loss does not meet criteria  Subcutaneous Fat Loss: Unable to assess  Muscle Loss: Unable to assess  Fluid Accumulation/Edema: None noted  Malnutrition Diagnosis: Unable to determine    Previous Goals   Patient to consume % of nutritionally adequate meal trays TID, or the equivalent with supplements/snacks.  Evaluation: Met    Previous Nutrition " Diagnosis  Predicted inadequate oral intakes related to stress/mental health conditions as evidenced by pt report  Evaluation: No change    CURRENT NUTRITION DIAGNOSIS  Predicted inadequate oral intakes related to stress/mental health conditions as evidenced by pt report    INTERVENTIONS  Implementation  Pt asking about what foods to eat at home, however pt frequently interrupted writer when speaking and unable to specify his questions about how to eat at home. Pt then asked writer to call back. Attempted to finish answering pts questions during visit, however pt became brief and wanted to hang up. Will attempt back as able.      Goals  Patient to consume % of nutritionally adequate meal trays TID, or the equivalent with supplements/snacks.    Monitoring/Evaluation  Progress toward goals will be monitored and evaluated per protocol.      Celena Mcfarland RD, LD  Unit Pager: [phone]

## 2020-06-22 NOTE — PLAN OF CARE
"Pt has been up in lounge intermittently this night manicy and intrusive behaviors continue,thought process has remained tangential and confused at times,pt stated during bg check that his body is \"rejecting his blood\",he also misplaced several items and needed oriented to time as well,he slept 4.5 hours this night,02 bg was 165,he states he is looking forward to talking to md about discharge planning this morning  "

## 2020-06-22 NOTE — PLAN OF CARE
BEHAVIORAL TEAM DISCUSSION    Participants:   Dr. Díaz via phone, Sahra Guillen RN, Genny GRAHAM    Progress:   This is day 10 of this admission.  Pt continues with symptoms of amber. Pt has intrusiveness and grandiosity and is hyperverbal.  Pt is doing well with his diabetic regimen.  Pt is sleeping poorly. RN asks for a discontinuation of the 2AM check in.  Pt has been upsetting the other patients on the unit.      Anticipated length of stay:   2 days    Continued Stay Criteria/Rationale:   The pt remains with symptoms of amber and needs further stabilization.      Medical/Physical:   Pt has multiple conditions evaluated by internal medicine.      Precautions:   Behavioral Orders   Procedures     Assault precautions     Code 1 - Restrict to Unit     Elopement precautions     Fall precautions     Routine Programming     As clinically indicated     Single Room     Status 15     Every 15 minutes.     Plan:   Provider will ask internal medicine about stopping the 2AM check in  Pt will not be placed a hold if he asks to leave  There will be no petition for commitment  Medication will be adjusted        Rationale for change in precautions or plan:   No changes
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
## 2020-06-22 NOTE — PROGRESS NOTES
"[facility]   Psychiatric Progress Note  Hospital Day: 10   Telemedicine Visit: The patient's condition can be safely assessed and treated via synchronous audio and visual telemedicine encounter.      Start Time:1100  Stop Time: 1115    Reason for Telemedicine Visit: Covid-19    Originating Site (Patient Location): [facility]    Distant Site (Provider Location): Provider Remote Setting    Consent:  The patient/guardian has verbally consented to: the potential risks and benefits of telemedicine (video visit) versus in person care; bill my insurance or make self-payment for services provided; and responsibility for payment of non-covered services.     Mode of Communication:  Video Conference via [employer]    As the provider I attest to compliance with applicable laws and regulations related to telemedicine.           Interim History:   The patient's care was discussed with the treatment team during the daily team meeting and/or staff's chart notes were reviewed.  Staff reports that patient continued to appear to have some amber symptoms over weekend, hyperverbal, pressured, tangential, taking medications as prescribed, can be disruptive and intrusive in the milieu but redirectable, no acute events over weekend.     Upon interview, the patient reports he owes writer and team an apology and went into an extended explanation of his symptoms and discussions had with staff stating \"maybe I do have some bipolarness\". He was agreeable to adjust medications to target ongoing symptoms including decreased sleep need, tangential/somewhat racing thoughts and paranoia. Discussed optimizing olanzapine as he reports side effects and concerns with mood stabilizers he has tried in the past. He has been sleeping around 5-7 hours per night, eating well, reports his BG have improved and he is happy with management of physical health needs. Denies SI/HI. Denies AVH, reports " "paranoia is \"there but improving\". He acknowledges importance of not making any big life decisions or financial transactions while in the hospital. He is agreeable to staying a few more days for ongoing adjustments. No additional concerns at this time.            Medications:       aspirin  81 mg Oral Daily     cyanocobalamin  1,000 mcg Oral Daily     ferrous sulfate  325 mg Oral Daily     insulin aspart   Subcutaneous TID w/meals     insulin degludec  22 Units Subcutaneous At Bedtime     lamiVUDine  100 mg Oral Daily     OLANZapine  10 mg Oral At Bedtime     polyethylene glycol  17 g Oral Daily     rosuvastatin  5 mg Oral Daily     tacrolimus  4 mg Oral Q12H     tamsulosin  0.4 mg Oral Daily     vitamin D3  2,000 Units Oral Daily          Allergies:     Allergies   Allergen Reactions     Codeine Other (See Comments)     Cannot take due to liver  Cannot tolerate oral narcotics     Seasonal Allergies      Sneezing, coughing, runny and itchy eyes          Labs:     Recent Results (from the past 48 hour(s))   Glucose by meter    Collection Time: 06/20/20  8:03 AM   Result Value Ref Range    Glucose 112 (H) 70 - 99 mg/dL   Glucose by meter    Collection Time: 06/20/20 10:39 AM   Result Value Ref Range    Glucose 202 (H) 70 - 99 mg/dL   Glucose by meter    Collection Time: 06/20/20 12:20 PM   Result Value Ref Range    Glucose 163 (H) 70 - 99 mg/dL   Glucose by meter    Collection Time: 06/20/20  2:57 PM   Result Value Ref Range    Glucose 116 (H) 70 - 99 mg/dL   Glucose by meter    Collection Time: 06/20/20  5:42 PM   Result Value Ref Range    Glucose 130 (H) 70 - 99 mg/dL   Glucose by meter    Collection Time: 06/20/20  7:00 PM   Result Value Ref Range    Glucose 174 (H) 70 - 99 mg/dL   Glucose by meter    Collection Time: 06/20/20 10:05 PM   Result Value Ref Range    Glucose 172 (H) 70 - 99 mg/dL   Glucose by meter    Collection Time: 06/21/20  3:15 AM   Result Value Ref Range    Glucose 145 (H) 70 - 99 mg/dL   Glucose " "by meter    Collection Time: 06/21/20  8:05 AM   Result Value Ref Range    Glucose 135 (H) 70 - 99 mg/dL   Glucose by meter    Collection Time: 06/21/20 10:43 AM   Result Value Ref Range    Glucose 161 (H) 70 - 99 mg/dL   Glucose by meter    Collection Time: 06/21/20 12:17 PM   Result Value Ref Range    Glucose 122 (H) 70 - 99 mg/dL   Glucose by meter    Collection Time: 06/21/20  2:52 PM   Result Value Ref Range    Glucose 109 (H) 70 - 99 mg/dL   Glucose by meter    Collection Time: 06/21/20  5:34 PM   Result Value Ref Range    Glucose 110 (H) 70 - 99 mg/dL   Glucose by meter    Collection Time: 06/21/20  9:36 PM   Result Value Ref Range    Glucose 165 (H) 70 - 99 mg/dL   Glucose by meter    Collection Time: 06/22/20  2:19 AM   Result Value Ref Range    Glucose 166 (H) 70 - 99 mg/dL          Psychiatric Examination:     /67 (BP Location: Right arm)   Pulse 97   Temp 98.1  F (36.7  C) (Oral)   Resp 16   Wt 69.4 kg (153 lb)   SpO2 100%   BMI 22.59 kg/m    Weight is 153 lbs 0 oz  Body mass index is 22.59 kg/m .    Orthostatic Vitals       Most Recent      Sitting Orthostatic /80 06/22 0742    Sitting Orthostatic Pulse (bpm) 93 06/22 0742    Standing Orthostatic /77 06/22 0742    Standing Orthostatic Pulse (bpm) 97 06/22 0742        Appearance: awake, alert, adequately groomed and appears recorded age  Attitude:  cooperative  Eye Contact:  good  Mood:  improving, \"pretty good\"  Affect:  appropriate and in normal range, mood congruent and reactive  Speech:  clear, coherent and mildly pressured, hyperverbal but interruptible  Language: fluent and intact in English  Psychomotor, Gait, Musculoskeletal:  no evidence of tardive dyskinesia, dystonia, or tics  Thought Process:  tangential  Associations:  no loose associations  Thought Content:  no evidence of suicidal ideation or homicidal ideation and paranoia and grandiose delusions present  Insight:  poor  Judgement:  limited, improving   Oriented " to:  time, person, and place  Attention Span and Concentration:  fair  Recent and Remote Memory:  fair  Fund of Knowledge:  appropriate      Clinical Global Impressions  First:  Considering your total clinical experience with this particular patient population, how severe are the patient's symptoms at this time?: 7 (06/13/20 0819)  Compared to the patient's condition at the START of treatment, this patient's condition is: 4 (06/13/20 0819)  Most recent:  Considering your total clinical experience with this particular patient population, how severe are the patient's symptoms at this time?: 7 (06/22/20 1514)  Compared to the patient's condition at the START of treatment, this patient's condition is: 3 (06/22/20 1514)         Precautions:     Behavioral Orders   Procedures     Assault precautions     Code 1 - Restrict to Unit     Elopement precautions     Fall precautions     Routine Programming     As clinically indicated     Single Room     Status 15     Every 15 minutes.          Diagnoses:   Bipolar disorder type 1, manic, severe with psychosis  Status post orthotopic liver transplant 11/11/2019 secondary to ESLD related to ESTRADA, hepatocellular carcinoma  Type 2 diabetes, insulin-dependent, with retinopathy  Acute kidney injury on chronic kidney disease stage 3  Benign prostatic hypertrophy  Hyperlipidemia         Assessment & Plan:   Assessment and hospital summary:  is a 55-year-old male admitted to [facility] on 6/11/2020, arriving on the unit 6/12/2020.  He was admitted on a 72-hour hold through the [facility] ER due to agitation and manic symptoms.  He was at the [facility] and asked someone to call 911 due to low BG.  He took a taxi to the ER.  He exhibited bizarre behaviors and tangential speech.  He has a history of multiple medical issues including liver transplant in 11/2019, chronic kidney disease and insulin-dependent diabetes.  Tacrolimus (immunosuppressant to  reduce chances of rejection of transplanted liver) level was low upon admission and he reports missing some doses.  His transplant coordinator and friend have initiated multiple welfare checks. In the ER he was very agitated and was placed in restraints.  Upon admission to the unit he remained agitated and staff reported he had had poor boundaries and had been touching staff and patients requiring redirection. Has not required any further use of restraints. IM was consulted on admission 2/2 medical complexity. PTA olanzapine was continued along with PRN clonazepam to target amber. Pt agreed to sign in voluntary 6/12 and increase olanzapine to 10mg to further target poor sleep and disorganization. He continues to report he does not have bipolar disorder and show limited insight into his mental health symptoms but has remained in the hospital due to his belief that he needs medical treatments.     Psychiatric treatment/inteventions:  Medications:   -increase PTA olanzapine to 2.5mg dailiy and 15mg at bedtime  -continue PTA clonazepam 1mg at bedtime PRN sleep     Laboratory/Imaging: no new labs per psychiatry, IM following as below     Patient will be treated in therapeutic milieu with appropriate individual and group therapies as described.     Medical treatment/interventions:  I have reviewed internal medicine recommendations in the note from 6/20. Agree with current plan.    Placed patient care order to allow patient to make one time payment on his loan that he reports is in default. This will occur under staff supervision. Patient met with nurse manager to discuss this to determine it was appropriate.    Disposition Plan   Reason for ongoing admission: is unable to care for self due to severe psychosis or amber  Discharge location: home with self-care and in home nursing referral placed last week  Discharge Medications: not ordered  Follow-up Appointments: not scheduled  Legal Status: voluntary, pt signed in  6/15  Entered by: Elsa Díaz on 6/22/2020 at 7:31 AM

## 2020-06-23 LAB
GLUCOSE BLDC GLUCOMTR-MCNC: 100 MG/DL (ref 70–99)
GLUCOSE BLDC GLUCOMTR-MCNC: 113 MG/DL (ref 70–99)
GLUCOSE BLDC GLUCOMTR-MCNC: 119 MG/DL (ref 70–99)
GLUCOSE BLDC GLUCOMTR-MCNC: 124 MG/DL (ref 70–99)
GLUCOSE BLDC GLUCOMTR-MCNC: 138 MG/DL (ref 70–99)
GLUCOSE BLDC GLUCOMTR-MCNC: 91 MG/DL (ref 70–99)
GLUCOSE BLDC GLUCOMTR-MCNC: 97 MG/DL (ref 70–99)

## 2020-06-23 PROCEDURE — 00000146 ZZHCL STATISTIC GLUCOSE BY METER IP

## 2020-06-23 PROCEDURE — 12400001 ZZH R&B MH UMMC

## 2020-06-23 PROCEDURE — 25000131 ZZH RX MED GY IP 250 OP 636 PS 637: Mod: GY | Performed by: PHYSICIAN ASSISTANT

## 2020-06-23 PROCEDURE — G0177 OPPS/PHP; TRAIN & EDUC SERV: HCPCS

## 2020-06-23 PROCEDURE — 25000132 ZZH RX MED GY IP 250 OP 250 PS 637: Mod: GY | Performed by: PSYCHIATRY & NEUROLOGY

## 2020-06-23 PROCEDURE — 25000132 ZZH RX MED GY IP 250 OP 250 PS 637: Mod: GY | Performed by: PHYSICIAN ASSISTANT

## 2020-06-23 PROCEDURE — 99232 SBSQ HOSP IP/OBS MODERATE 35: CPT | Mod: 95 | Performed by: PSYCHIATRY & NEUROLOGY

## 2020-06-23 RX ORDER — OLANZAPINE 2.5 MG/1
2.5 TABLET, FILM COATED ORAL DAILY
Status: DISCONTINUED | OUTPATIENT
Start: 2020-06-24 | End: 2020-06-25

## 2020-06-23 RX ORDER — PANTOPRAZOLE SODIUM 40 MG/1
40 TABLET, DELAYED RELEASE ORAL
Status: DISCONTINUED | OUTPATIENT
Start: 2020-06-23 | End: 2020-06-26 | Stop reason: HOSPADM

## 2020-06-23 RX ADMIN — TAMSULOSIN HYDROCHLORIDE 0.4 MG: 0.4 CAPSULE ORAL at 08:10

## 2020-06-23 RX ADMIN — POLYETHYLENE GLYCOL 3350 17 G: 17 POWDER, FOR SOLUTION ORAL at 08:10

## 2020-06-23 RX ADMIN — CYANOCOBALAMIN TAB 1000 MCG 1000 MCG: 1000 TAB at 08:10

## 2020-06-23 RX ADMIN — OLANZAPINE 15 MG: 10 TABLET, FILM COATED ORAL at 20:32

## 2020-06-23 RX ADMIN — PANTOPRAZOLE SODIUM 40 MG: 40 TABLET, DELAYED RELEASE ORAL at 11:28

## 2020-06-23 RX ADMIN — OLANZAPINE 2.5 MG: 2.5 TABLET, FILM COATED ORAL at 08:10

## 2020-06-23 RX ADMIN — FERROUS SULFATE TAB 325 MG (65 MG ELEMENTAL FE) 325 MG: 325 (65 FE) TAB at 08:10

## 2020-06-23 RX ADMIN — INSULIN ASPART 5 UNITS: 100 INJECTION, SOLUTION INTRAVENOUS; SUBCUTANEOUS at 18:03

## 2020-06-23 RX ADMIN — ASPIRIN 81 MG: 81 TABLET ORAL at 08:10

## 2020-06-23 RX ADMIN — INSULIN ASPART 5 UNITS: 100 INJECTION, SOLUTION INTRAVENOUS; SUBCUTANEOUS at 08:36

## 2020-06-23 RX ADMIN — ROSUVASTATIN CALCIUM 5 MG: 5 TABLET, FILM COATED ORAL at 08:10

## 2020-06-23 RX ADMIN — ACETAMINOPHEN 650 MG: 325 TABLET, FILM COATED ORAL at 23:31

## 2020-06-23 RX ADMIN — MELATONIN 2000 UNITS: at 08:10

## 2020-06-23 RX ADMIN — INSULIN DEGLUDEC INJECTION 22 UNITS: 100 INJECTION, SOLUTION SUBCUTANEOUS at 20:32

## 2020-06-23 RX ADMIN — LAMIVUDINE 100 MG: 100 TABLET, FILM COATED ORAL at 08:10

## 2020-06-23 RX ADMIN — TACROLIMUS 4 MG: 1 CAPSULE ORAL at 06:54

## 2020-06-23 RX ADMIN — INSULIN ASPART 2 UNITS: 100 INJECTION, SOLUTION INTRAVENOUS; SUBCUTANEOUS at 12:38

## 2020-06-23 RX ADMIN — TACROLIMUS 4 MG: 1 CAPSULE ORAL at 18:03

## 2020-06-23 ASSESSMENT — ACTIVITIES OF DAILY LIVING (ADL)
DRESS: SCRUBS (BEHAVIORAL HEALTH)
ORAL_HYGIENE: INDEPENDENT
HYGIENE/GROOMING: INDEPENDENT
LAUNDRY: WITH SUPERVISION

## 2020-06-23 NOTE — PLAN OF CARE
Pt actively participated in occupational therapy clinic. Pt was able to ask for assistance as needed, and engage in a novel, goal-directed task with assistance for task setup, organization, and sequencing. Pt demonstrated good focus and planning, with occasional assistance to problem solve during task completion. Pt appeared comfortable interacting with peers and writer, and socialized throughout his time in group. He was pleasant and engaged with a congruent affect.

## 2020-06-23 NOTE — PROGRESS NOTES
Pt social in the milieu, calm. Pt reported no depression however did report anxiety. Pt made grandiose statements, such as saying he has an IQ above 140. Pt was overall polite, however at times slightly odd. He had no questions and denied SI/SIB.         06/22/20 2139   Behavioral Health   Hallucinations denies / not responding to hallucinations   Thinking distractable   Orientation time: oriented;date: oriented;place: oriented;person: oriented   Memory confabulation   Insight poor   Judgement impaired   Eye Contact at examiner   Affect blunted, flat   Mood anxious   Physical Appearance/Attire appears stated age   Hygiene well groomed   1. Wish to be Dead (Recent) No   2. Non-Specific Active Suicidal Thoughts (Recent) No   Elopement   (no)   Activity   (participates)   Speech clear;coherent   Medication Sensitivity no stated side effects   Psychomotor / Gait balanced;steady   Activities of Daily Living   Hygiene/Grooming independent   Oral Hygiene independent   Dress scrubs (behavioral health)   Laundry with supervision   Room Organization independent
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
## 2020-06-23 NOTE — PROGRESS NOTES
06/22/20 1700   Group Therapy Session   Group Attendance attended group session   Total Time (minutes) 45   Group Type psychotherapeutic   Patient Participation/Contribution cooperative with task;discussed personal experience with topic;listened actively;offered helpful suggestions to peers   Patient participated in psychotherapy group which included a mindfulness activity focusing on the 5 senses and then processing as a group.    Miller reports feeling emotionally up and down all day. He presents in a calm, pleasant mood during group. He engaged in the activity, processed with the group, and was supportive of others. He was particularly kind and encouraging to one other patient (ASAD).

## 2020-06-23 NOTE — PROGRESS NOTES
06/23/20 1400   Behavioral Health   Hallucinations denies / not responding to hallucinations   Thinking intact   Orientation person: oriented;place: oriented;date: oriented;time: oriented   Memory baseline memory   Eye Contact at examiner   Affect full range affect   Mood other (see comments)  (Mildly elated.)   Physical Appearance/Attire attire appropriate to age and situation   Hygiene well groomed   Suicidality other (see comments)  (Denied.)   1. Wish to be Dead (Recent) No   2. Non-Specific Active Suicidal Thoughts (Recent) No   Elopement   (Pt does not appear to be a run risk.)   Activity other (see comment)  (Participated in several groups. Prosocial in the milieu.)   Speech clear;coherent   Medication Sensitivity no stated side effects;no observed side effects   Psychomotor / Gait balanced;steady

## 2020-06-23 NOTE — PROGRESS NOTES
Brief Nutrition Note  Followed up with diet education for pt. Pt with specific questions regarding sodium content of soups and foods to eat at home. Stated he will be getting meals made from a grocery store near his house. Discussed general healthy diet and planning meals using myplate. Handouts tubed to nurses station.   Lili Díaz MS, RD, LDN  Unit Pager [phone]

## 2020-06-23 NOTE — PLAN OF CARE
"Participated in 2 OT groups.   MHM focused on physical movement and wellness. Patient required cues not to do something if he felt pain, despite initial instructions of this. He modified when given options. He reported \"I haven't felt this good in 10 years.\" He was receiving excessive stretching advice from peer and expressed he just wanted to focus on the video.  Patient participated in topic group focused on gratitude. He completed associated activity with cues and check ins. He reported enjoying group.   "

## 2020-06-24 LAB
GLUCOSE BLDC GLUCOMTR-MCNC: 101 MG/DL (ref 70–99)
GLUCOSE BLDC GLUCOMTR-MCNC: 110 MG/DL (ref 70–99)
GLUCOSE BLDC GLUCOMTR-MCNC: 172 MG/DL (ref 70–99)
GLUCOSE BLDC GLUCOMTR-MCNC: 75 MG/DL (ref 70–99)
GLUCOSE BLDC GLUCOMTR-MCNC: 80 MG/DL (ref 70–99)
GLUCOSE BLDC GLUCOMTR-MCNC: 85 MG/DL (ref 70–99)
GLUCOSE BLDC GLUCOMTR-MCNC: 87 MG/DL (ref 70–99)
GLUCOSE BLDC GLUCOMTR-MCNC: 92 MG/DL (ref 70–99)

## 2020-06-24 PROCEDURE — 00000146 ZZHCL STATISTIC GLUCOSE BY METER IP

## 2020-06-24 PROCEDURE — G0177 OPPS/PHP; TRAIN & EDUC SERV: HCPCS

## 2020-06-24 PROCEDURE — 25000132 ZZH RX MED GY IP 250 OP 250 PS 637: Mod: GY | Performed by: PHYSICIAN ASSISTANT

## 2020-06-24 PROCEDURE — 25000132 ZZH RX MED GY IP 250 OP 250 PS 637: Mod: GY | Performed by: PSYCHIATRY & NEUROLOGY

## 2020-06-24 PROCEDURE — 12400001 ZZH R&B MH UMMC

## 2020-06-24 PROCEDURE — 25000131 ZZH RX MED GY IP 250 OP 636 PS 637: Mod: GY | Performed by: PHYSICIAN ASSISTANT

## 2020-06-24 RX ADMIN — ACETAMINOPHEN 650 MG: 325 TABLET, FILM COATED ORAL at 18:12

## 2020-06-24 RX ADMIN — INSULIN ASPART 5 UNITS: 100 INJECTION, SOLUTION INTRAVENOUS; SUBCUTANEOUS at 08:40

## 2020-06-24 RX ADMIN — OLANZAPINE 2.5 MG: 2.5 TABLET, FILM COATED ORAL at 08:09

## 2020-06-24 RX ADMIN — TACROLIMUS 4 MG: 1 CAPSULE ORAL at 17:22

## 2020-06-24 RX ADMIN — TAMSULOSIN HYDROCHLORIDE 0.4 MG: 0.4 CAPSULE ORAL at 08:09

## 2020-06-24 RX ADMIN — LAMIVUDINE 100 MG: 100 TABLET, FILM COATED ORAL at 08:09

## 2020-06-24 RX ADMIN — MELATONIN 2000 UNITS: at 08:09

## 2020-06-24 RX ADMIN — CYANOCOBALAMIN TAB 1000 MCG 1000 MCG: 1000 TAB at 08:10

## 2020-06-24 RX ADMIN — OLANZAPINE 15 MG: 10 TABLET, FILM COATED ORAL at 22:06

## 2020-06-24 RX ADMIN — INSULIN DEGLUDEC INJECTION 22 UNITS: 100 INJECTION, SOLUTION SUBCUTANEOUS at 22:06

## 2020-06-24 RX ADMIN — INSULIN ASPART 4 UNITS: 100 INJECTION, SOLUTION INTRAVENOUS; SUBCUTANEOUS at 18:10

## 2020-06-24 RX ADMIN — POLYETHYLENE GLYCOL 3350 17 G: 17 POWDER, FOR SOLUTION ORAL at 08:10

## 2020-06-24 RX ADMIN — HYDROXYZINE HYDROCHLORIDE 25 MG: 25 TABLET, FILM COATED ORAL at 01:07

## 2020-06-24 RX ADMIN — FERROUS SULFATE TAB 325 MG (65 MG ELEMENTAL FE) 325 MG: 325 (65 FE) TAB at 08:09

## 2020-06-24 RX ADMIN — INSULIN ASPART 5 UNITS: 100 INJECTION, SOLUTION INTRAVENOUS; SUBCUTANEOUS at 13:13

## 2020-06-24 RX ADMIN — TACROLIMUS 4 MG: 1 CAPSULE ORAL at 06:58

## 2020-06-24 RX ADMIN — ASPIRIN 81 MG: 81 TABLET ORAL at 08:10

## 2020-06-24 RX ADMIN — ROSUVASTATIN CALCIUM 5 MG: 5 TABLET, FILM COATED ORAL at 08:09

## 2020-06-24 RX ADMIN — PANTOPRAZOLE SODIUM 40 MG: 40 TABLET, DELAYED RELEASE ORAL at 08:10

## 2020-06-24 ASSESSMENT — ACTIVITIES OF DAILY LIVING (ADL)
ORAL_HYGIENE: INDEPENDENT
HYGIENE/GROOMING: INDEPENDENT;SHOWER
DRESS: SCRUBS (BEHAVIORAL HEALTH);INDEPENDENT
ORAL_HYGIENE: INDEPENDENT
DRESS: INDEPENDENT;SCRUBS (BEHAVIORAL HEALTH)
LAUNDRY: WITH SUPERVISION
HYGIENE/GROOMING: INDEPENDENT

## 2020-06-24 NOTE — PLAN OF CARE
Pt out in common areas most of shift.  Pt attended and participated in available groups. Pt had bright expansive affect. Pt was medication compliant.  Pt was cooperative with all diabetic cares.  Pt was able to correctly calculate carb coverage for meals. Speech is less pressured, but still rambling. Continues to need support to maintain appropriate boundaries.

## 2020-06-24 NOTE — PROGRESS NOTES
"Miller has some toenails that need to be trimmed. He just stopped at the Nurse's desk to informed this writer that \"I have been calling my hepatologist and left messages about getting my toenails trimmed.\"  Stated \"He hasn't called me back because he has been surgery.\" Patient informed that this writer will leave a message for day shift to have Internal med called to trim his toenails.  Miller's feet and ankles are edematous.  "
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
## 2020-06-24 NOTE — PLAN OF CARE
"Miller has been out and about on the unit. He continues to display behaviors consistent with Rekha. He continues to be hyper verbal, loud, his stories long winded and expansive. He continues to be intrusive with poor boundaries. He announced loudly at the Nurse's station, \"I am having great bowel movements. Nice and round, but I'm going twice a day. I want to know why I'm having terrible gas.\"  \"I just want all of the staff to know that when I leave here that I'm buying Beat headphones for patients to use up here instead of these cheap ones.\"  He went on too inform staff that he just had bought  some [employer] stock and some how came into money. He inform this writer that \"I'm only leaving Friday because that is when my 14 days on medicare are up. My insurance won't pay the hospital after that.\" He has been cooperative with blood sugar monitoring and insulin administration. He continues to insist he is only here for a \"Diabetes\" tune up. His plan is to discharge on Friday. Nursing to continue current plan of care.    "

## 2020-06-24 NOTE — PLAN OF CARE
Patient participated in MHM group focused on wellness and mood using physical movement. Patient was engaged and participatory and engaged others in group as well. He contributed to a positive group experience. He made an odd reference to politics, but was redirected away from it and recognized humor in the situation.

## 2020-06-25 LAB
GLUCOSE BLDC GLUCOMTR-MCNC: 148 MG/DL (ref 70–99)
GLUCOSE BLDC GLUCOMTR-MCNC: 153 MG/DL (ref 70–99)
GLUCOSE BLDC GLUCOMTR-MCNC: 164 MG/DL (ref 70–99)
GLUCOSE BLDC GLUCOMTR-MCNC: 179 MG/DL (ref 70–99)
GLUCOSE BLDC GLUCOMTR-MCNC: 198 MG/DL (ref 70–99)

## 2020-06-25 PROCEDURE — 25000132 ZZH RX MED GY IP 250 OP 250 PS 637: Mod: GY | Performed by: PHYSICIAN ASSISTANT

## 2020-06-25 PROCEDURE — 25000132 ZZH RX MED GY IP 250 OP 250 PS 637: Mod: GY | Performed by: PSYCHIATRY & NEUROLOGY

## 2020-06-25 PROCEDURE — 25000131 ZZH RX MED GY IP 250 OP 636 PS 637: Mod: GY | Performed by: PHYSICIAN ASSISTANT

## 2020-06-25 PROCEDURE — 00000146 ZZHCL STATISTIC GLUCOSE BY METER IP

## 2020-06-25 PROCEDURE — 12400001 ZZH R&B MH UMMC

## 2020-06-25 PROCEDURE — G0177 OPPS/PHP; TRAIN & EDUC SERV: HCPCS

## 2020-06-25 PROCEDURE — 90853 GROUP PSYCHOTHERAPY: CPT

## 2020-06-25 PROCEDURE — 99232 SBSQ HOSP IP/OBS MODERATE 35: CPT | Mod: 95 | Performed by: PSYCHIATRY & NEUROLOGY

## 2020-06-25 RX ORDER — PANTOPRAZOLE SODIUM 40 MG/1
40 TABLET, DELAYED RELEASE ORAL
Qty: 30 TABLET | Refills: 0 | Status: SHIPPED | OUTPATIENT
Start: 2020-06-26 | End: 2020-07-29

## 2020-06-25 RX ORDER — OLANZAPINE 2.5 MG/1
2.5 TABLET, FILM COATED ORAL DAILY
Qty: 30 TABLET | Refills: 0 | Status: SHIPPED | OUTPATIENT
Start: 2020-06-26 | End: 2020-06-25

## 2020-06-25 RX ORDER — OLANZAPINE 15 MG/1
15 TABLET ORAL AT BEDTIME
Qty: 30 TABLET | Refills: 0 | Status: SHIPPED | OUTPATIENT
Start: 2020-06-25 | End: 2020-08-20

## 2020-06-25 RX ORDER — CLONAZEPAM 1 MG/1
1 TABLET ORAL
Qty: 30 TABLET | Refills: 0 | Status: SHIPPED | OUTPATIENT
Start: 2020-06-25 | End: 2020-08-20

## 2020-06-25 RX ADMIN — INSULIN ASPART 2 UNITS: 100 INJECTION, SOLUTION INTRAVENOUS; SUBCUTANEOUS at 12:46

## 2020-06-25 RX ADMIN — TACROLIMUS 4 MG: 1 CAPSULE ORAL at 06:51

## 2020-06-25 RX ADMIN — CLONAZEPAM 0.5 MG: 0.5 TABLET ORAL at 02:04

## 2020-06-25 RX ADMIN — TACROLIMUS 4 MG: 1 CAPSULE ORAL at 18:22

## 2020-06-25 RX ADMIN — MELATONIN 2000 UNITS: at 08:11

## 2020-06-25 RX ADMIN — LAMIVUDINE 100 MG: 100 TABLET, FILM COATED ORAL at 08:11

## 2020-06-25 RX ADMIN — FERROUS SULFATE TAB 325 MG (65 MG ELEMENTAL FE) 325 MG: 325 (65 FE) TAB at 08:11

## 2020-06-25 RX ADMIN — PANTOPRAZOLE SODIUM 40 MG: 40 TABLET, DELAYED RELEASE ORAL at 08:11

## 2020-06-25 RX ADMIN — POLYETHYLENE GLYCOL 3350 17 G: 17 POWDER, FOR SOLUTION ORAL at 08:12

## 2020-06-25 RX ADMIN — INSULIN ASPART 5 UNITS: 100 INJECTION, SOLUTION INTRAVENOUS; SUBCUTANEOUS at 18:22

## 2020-06-25 RX ADMIN — ROSUVASTATIN CALCIUM 5 MG: 5 TABLET, FILM COATED ORAL at 08:11

## 2020-06-25 RX ADMIN — ACETAMINOPHEN 650 MG: 325 TABLET, FILM COATED ORAL at 00:18

## 2020-06-25 RX ADMIN — INSULIN DEGLUDEC INJECTION 22 UNITS: 100 INJECTION, SOLUTION SUBCUTANEOUS at 20:26

## 2020-06-25 RX ADMIN — HYDROXYZINE HYDROCHLORIDE 25 MG: 25 TABLET, FILM COATED ORAL at 00:18

## 2020-06-25 RX ADMIN — TAMSULOSIN HYDROCHLORIDE 0.4 MG: 0.4 CAPSULE ORAL at 08:11

## 2020-06-25 RX ADMIN — OLANZAPINE 2.5 MG: 2.5 TABLET, FILM COATED ORAL at 08:11

## 2020-06-25 RX ADMIN — ASPIRIN 81 MG: 81 TABLET ORAL at 08:11

## 2020-06-25 RX ADMIN — OLANZAPINE 15 MG: 10 TABLET, FILM COATED ORAL at 20:21

## 2020-06-25 RX ADMIN — INSULIN ASPART 3 UNITS: 100 INJECTION, SOLUTION INTRAVENOUS; SUBCUTANEOUS at 08:54

## 2020-06-25 RX ADMIN — CYANOCOBALAMIN TAB 1000 MCG 1000 MCG: 1000 TAB at 08:11

## 2020-06-25 ASSESSMENT — ACTIVITIES OF DAILY LIVING (ADL)
ORAL_HYGIENE: INDEPENDENT
ORAL_HYGIENE: INDEPENDENT
HYGIENE/GROOMING: INDEPENDENT
DRESS: INDEPENDENT
LAUNDRY: WITH SUPERVISION
HYGIENE/GROOMING: INDEPENDENT
DRESS: INDEPENDENT

## 2020-06-25 NOTE — PROGRESS NOTES
Patient visible and active in the milieu throughout the shift.  Overall, he has presented as moderately pressured and distractable, but pleasant on approach, cooperative with necessary interventions and protocols, and responsive to staff inquiries, feedback, and requests.  Observed peer interactions have been generally appropriate and not requiring redirection regarding boundaries (at the time of this note). Program engagement has been selective (2 of 3 groups).  Observed mood has been mildly anxious but stable.  Affect full-range and congruent overall with mood.  Verbalized thoughts impressed writer as a bit tangential and rambling but free of overt delusional content or other psychotic distortions.  Patient endorsed mild to moderate anxiety around discharge plans but denied all other MH concerns, issues, and anxieties at this time.  Expects to discharge back to his home late tomorrow morning.   Gonzalo Zheng   06/25/2020 06/25/20 1204   Sleep/Rest/Relaxation   Day/Evening Time Hours up all shift   Pain/Comfort/Sleep   Pain Body Location - Orientation lower   Pain Body Location back   Cognitive   Level Of Consciousness alert   Arousal Level arouses to voice   Orientation oriented x 4   Follows Commands yes   Speech clear;spontaneous   Best Language 0 - No aphasia   Mood/Behavior anxious;calm;cooperative;behavior appropriate to situation   Behavioral Health   Hallucinations denies / not responding to hallucinations   Thinking distractable   Orientation person: oriented;place: oriented;date: oriented;time: oriented   Memory baseline memory   Insight other (see comment)  (limited)   Judgement impaired   Eye Contact at examiner   Affect full range affect   Mood anxious   Physical Appearance/Attire appears stated age;attire appropriate to age and situation;neat   Hygiene well groomed   Suicidality other (see comments)  (denied SI)   1. Wish to be Dead (Recent) No   2. Non-Specific Active Suicidal Thoughts (Recent)  No   Self Injury other (see comment)  (denied SIB thoughts or urges)   Elopement   (no current indicators - discharging tomorrow)   Activity restless   Speech pressured;rambling   Psychomotor / Gait balanced;steady   Overt Aggression Scale   Verbal Aggression 0   Aggression against Property 0   Auto-Aggression 0   Physical Aggression 0   Overt Aggression Total Score 0   Perceptual State WDL   Hallucinations denies hallucinations   Psycho Education   Type of Intervention 1:1 intervention   Response participates with encouragement   Hours 0.5   Treatment Detail current MH status   Group Therapy Session   Group Attendance attended group session   Safety   Suicidality Status 15   Violence Risk   Feels Like Hurting Others no   Activities of Daily Living   Hygiene/Grooming independent   Oral Hygiene independent   Dress independent   Room Organization independent   Groups   Details all available

## 2020-06-25 NOTE — PROGRESS NOTES
"[facility], [place]   Psychiatric Progress Note  Hospital Day: 13   Telemedicine Visit: The patient's condition can be safely assessed and treated via synchronous audio and visual telemedicine encounter.      Start Time: 0930  Stop Time: 0945    Reason for Telemedicine Visit: Covid-19    Originating Site (Patient Location): [facility]    Distant Site (Provider Location): Provider Remote Setting    Consent:  The patient/guardian has verbally consented to: the potential risks and benefits of telemedicine (video visit) versus in person care; bill my insurance or make self-payment for services provided; and responsibility for payment of non-covered services.     Mode of Communication:  Video Conference via [employer]    As the provider I attest to compliance with applicable laws and regulations related to telemedicine.           Interim History:   The patient's care was discussed with the treatment team during the daily team meeting and/or staff's chart notes were reviewed.  Staff reports patient was visible in the milieu, did have episode of agitation overnight and was redirectable by staff and received PRN medications, taking medications as prescribed.     Upon interview, the patient reports his mood is good, he is worried about how he \"eploded\" last night and discussed how stabilization is not linear and he will likely have episodes of agitation but hopefully will have less with medication adherence, if it does not improve discussed he may need addition of mood stabilizer in future and he was in agreement with this. He is tolerating increased dose of olanzapine, feels it has been helpful for him to get better sleep and even took a nap yesterday. He had some paranoia around new patients coming on the unit but is feeling comfortable today. He denies SI ro HI. He is feeling ready to discharge tomorrow, has a friend who is an RN who is going to help him get groceries and " transition back to his home. He also has a cousin who lives in [place] who is an RN that will be flying in on Saturday to stay with him for a few days. He understands he has many appointments coming up upon discharge and reviewed some of them with CTC. No additional concerns at this time.     Writer contacted by RN later that patient reporting some dizziness and oversedation from 2.5mg AM olanzapine dose, will discontinue at this time.          Medications:       aspirin  81 mg Oral Daily     cyanocobalamin  1,000 mcg Oral Daily     ferrous sulfate  325 mg Oral Daily     insulin aspart   Subcutaneous TID w/meals     insulin degludec  22 Units Subcutaneous At Bedtime     lamiVUDine  100 mg Oral Daily     OLANZapine  15 mg Oral At Bedtime     OLANZapine  2.5 mg Oral Daily     pantoprazole  40 mg Oral QAM AC     polyethylene glycol  17 g Oral Daily     rosuvastatin  5 mg Oral Daily     tacrolimus  4 mg Oral Q12H     tamsulosin  0.4 mg Oral Daily     vitamin D3  2,000 Units Oral Daily          Allergies:     Allergies   Allergen Reactions     Codeine Other (See Comments)     Cannot take due to liver  Cannot tolerate oral narcotics     Seasonal Allergies      Sneezing, coughing, runny and itchy eyes          Labs:     Recent Results (from the past 48 hour(s))   Glucose by meter    Collection Time: 06/23/20 12:17 PM   Result Value Ref Range    Glucose 100 (H) 70 - 99 mg/dL   Glucose by meter    Collection Time: 06/23/20  2:32 PM   Result Value Ref Range    Glucose 119 (H) 70 - 99 mg/dL   Glucose by meter    Collection Time: 06/23/20  5:29 PM   Result Value Ref Range    Glucose 113 (H) 70 - 99 mg/dL   Glucose by meter    Collection Time: 06/23/20  7:47 PM   Result Value Ref Range    Glucose 138 (H) 70 - 99 mg/dL   Glucose by meter    Collection Time: 06/24/20  1:46 AM   Result Value Ref Range    Glucose 92 70 - 99 mg/dL   Glucose by meter    Collection Time: 06/24/20  8:12 AM   Result Value Ref Range    Glucose 75 70 - 99  mg/dL   Glucose by meter    Collection Time: 06/24/20 10:51 AM   Result Value Ref Range    Glucose 110 (H) 70 - 99 mg/dL   Glucose by meter    Collection Time: 06/24/20 12:01 PM   Result Value Ref Range    Glucose 80 70 - 99 mg/dL   Glucose by meter    Collection Time: 06/24/20 12:38 PM   Result Value Ref Range    Glucose 87 70 - 99 mg/dL   Glucose by meter    Collection Time: 06/24/20  2:57 PM   Result Value Ref Range    Glucose 85 70 - 99 mg/dL   Glucose by meter    Collection Time: 06/24/20  5:20 PM   Result Value Ref Range    Glucose 101 (H) 70 - 99 mg/dL   Glucose by meter    Collection Time: 06/24/20 10:06 PM   Result Value Ref Range    Glucose 172 (H) 70 - 99 mg/dL   Glucose by meter    Collection Time: 06/25/20  2:07 AM   Result Value Ref Range    Glucose 179 (H) 70 - 99 mg/dL   Glucose by meter    Collection Time: 06/25/20  8:16 AM   Result Value Ref Range    Glucose 153 (H) 70 - 99 mg/dL          Psychiatric Examination:     /71   Pulse 103   Temp 98.1  F (36.7  C) (Oral)   Resp 16   Wt 69.4 kg (153 lb)   SpO2 100%   BMI 22.59 kg/m    Weight is 153 lbs 0 oz  Body mass index is 22.59 kg/m .    Orthostatic Vitals       Most Recent      Sitting Orthostatic /74 06/25 0805    Sitting Orthostatic Pulse (bpm) 93 06/25 0805    Standing Orthostatic /72 06/25 0805    Standing Orthostatic Pulse (bpm) 96 06/25 0805        Appearance: awake, alert, adequately groomed and appears recorded age  Attitude:  cooperative  Eye Contact:  good  Mood:  better  Affect:  appropriate and in normal range, mood congruent and reactive  Speech:  clear, coherent and no longer pressured, less hyperverbal   Language: fluent and intact in English  Psychomotor, Gait, Musculoskeletal:  no evidence of tardive dyskinesia, dystonia, or tics  Thought Process:  tangential  Associations:  no loose associations  Thought Content:  no evidence of suicidal ideation or homicidal ideation and paranoia and grandiose delusions  present but improving  Insight:  partial  Judgement:  limited, improving   Oriented to:  time, person, and place  Attention Span and Concentration:  fair  Recent and Remote Memory:  fair  Fund of Knowledge:  appropriate    Clinical Global Impressions  First:  Considering your total clinical experience with this particular patient population, how severe are the patient's symptoms at this time?: 7 (06/13/20 0819)  Compared to the patient's condition at the START of treatment, this patient's condition is: 4 (06/13/20 0819)  Most recent:  Considering your total clinical experience with this particular patient population, how severe are the patient's symptoms at this time?: 7 (06/22/20 1514)  Compared to the patient's condition at the START of treatment, this patient's condition is: 3 (06/22/20 1514)           Precautions:     Behavioral Orders   Procedures     Assault precautions     Code 1 - Restrict to Unit     Elopement precautions     Fall precautions     Routine Programming     As clinically indicated     Single Room     Status 15     Every 15 minutes.          Diagnoses:   Bipolar disorder type 1, manic, severe with psychosis  Status post orthotopic liver transplant 11/11/2019 secondary to ESLD related to ESTRADA, hepatocellular carcinoma  Type 2 diabetes, insulin-dependent, with retinopathy  Acute kidney injury on chronic kidney disease stage 3  Benign prostatic hypertrophy  Hyperlipidemia         Assessment & Plan:   Assessment and hospital summary:  is a 55-year-old male admitted to [address] on 6/11/2020, arriving on the unit 6/12/2020.  He was admitted on a 72-hour hold through the [facility] ER due to agitation and manic symptoms.  He was at the [facility] and asked someone to call 911 due to low BG.  He took a taxi to the ER.  He exhibited bizarre behaviors and tangential speech.  He has a history of multiple medical issues including liver transplant in 11/2019, chronic  kidney disease and insulin-dependent diabetes.  Tacrolimus (immunosuppressant to reduce chances of rejection of transplanted liver) level was low upon admission and he reports missing some doses.  His transplant coordinator and friend have initiated multiple welfare checks. In the ER he was very agitated and was placed in restraints.  Upon admission to the unit he remained agitated and staff reported he had had poor boundaries and had been touching staff and patients requiring redirection. Has not required any further use of restraints. IM was consulted on admission 2/2 medical complexity. PTA olanzapine was continued along with PRN clonazepam to target amber. Pt agreed to sign in voluntary 6/12 and increase olanzapine to 10mg to further target poor sleep and disorganization. He continues to report he does not have bipolar disorder and show limited insight into his mental health symptoms but has remained in the hospital due to his belief that he needs medical treatments. He has started to show some insight into symptoms of bipolar disorder and more agreeable to discussing treatments to target his symptoms including increasing olanzapine further to target amber.     Psychiatric treatment/inteventions:  Medications:   -discontinue olanzapine 2.5mg in AM   -continue PTA olanzapine at increased dose of 15mg at bedtime  -continue PTA clonazepam 1mg at bedtime PRN sleep     Laboratory/Imaging: no new labs per psychiatry, IM was following as below     Patient will be treated in therapeutic milieu with appropriate individual and group therapies as described.     Medical treatment/interventions:  I have reviewed internal medicine recommendations in the note from 6/20. Agree with current plan. Also patient requested to be restarted on PTA omeprazole 6/23, after discussion with PharmD, will change this to pantoprazole starting 6/24 due to interaction with tacrolimus.       Disposition Plan   Reason for ongoing admission:  ongoing stabilization, will plan to discharge home 6/26  Discharge location: home with self-care and in home nursing referral placed last week  Discharge Medications: ordered. by psychiatry, will reach out to IM for medical orders  Follow-up Appointments: scheduled  Legal Status: voluntary, pt signed in 6/15  Entered by: Elsa Díaz on 6/25/2020 at 12:15 PM

## 2020-06-25 NOTE — PROGRESS NOTES
"Pt pleasant and joking at the beginning of the shift, requested Tylenol for back pain d/t \"working out too much earlier\" as well as hydroxyzine to help him sleep at 0015. Pt continued to alternate going to his room and then coming out to the desk and lounge area for the next hour and a half. At 0200 when this writer approached pt as he was standing at the desk he was very irritable and stated \"just drug me up then! I'm sick of this shit, just put me to sleep I want the Klonopin.\" When writer administered medication to pt he refused to take dose as it was ordered, stated \"I know what I'm doing, I'm only taking one (pill).\" Pt received 0.5mg Klonopin at this time, the other 0.5mg was wasted and witnessed by co-RN. Pt BG check at this time was 179, he then returned to his room. Within 30 minutes pt was back in the hallway and wearing his personal clothing, approached desk and stated \"that's it, I'm going, I'm ready to leave.\" Pt reminded that he couldn't discharge in the middle of the night and without talking to the doctor, pt responded \"fine I'll wait here til then,\" and went to sit in the lounge. Within 30 minutes pt returned to his room and lay down, appears to be asleep at this time. Will continue to monitor and support plan of care.   "

## 2020-06-25 NOTE — PROGRESS NOTES
Work Completed:  Teamed on pt. Pt will discharge tomorrow to home. He wants to go right after community meeting.    RN got an AMBER and a phone number for the nurse who is going to come live with him. The phone # didn't work so I called pt to the desk and he had the corrected phone.  Paty Gaviria @ [phone]  I called and left a vm.      Pt showed me the appointments he had changed on the last AVS with purple marker.  I printed a new one which had some of his changes but not all. He took the new one and was going to work on these appointments some more.    I am keeping the medical social worker and the transplant coordinator updated.    Discharge plan or goal:   Home tomorrow with services.      Barriers to discharge:   None

## 2020-06-25 NOTE — PROGRESS NOTES
"Pt appeared to be in a calm mood this evening, though he stated that he has been anxious. He spent his time listening to music, watching movies, and socializing with peers. He was cooperative and polite. He took a shower and ate his dinner. He was tangential at times and had difficulty staying on topic when talking to the writer. He believes the reason he is here is to adjust his blood sugar levels and increase his weight, both of which he feels he has accomplished. As such, he believes he is ready to discharge and hopes to leave on Friday. He said, \"I feel like my doctor will come up with a new reason to hold me here longer come Friday.\" He said he is going to get angry if he is not able to leave on Friday. He denies any SI, SIB, or hallucinations.     06/24/20 2100   Behavioral Health   Hallucinations denies / not responding to hallucinations   Thinking poor concentration   Orientation person: oriented;place: oriented   Memory baseline memory   Insight poor   Judgement impaired   Eye Contact at examiner   Affect full range affect   Mood anxious;grandiose   Physical Appearance/Attire appears older than stated age   Hygiene well groomed   Suicidality other (see comments)  (denies)   1. Wish to be Dead (Recent) No   2. Non-Specific Active Suicidal Thoughts (Recent) No   Self Injury other (see comment)  (denies)   Elopement   (none)   Activity other (see comment)  (visible in milieu)   Speech clear;tangential;flight of ideas   Medication Sensitivity no stated side effects;no observed side effects   Psychomotor / Gait balanced;steady   Activities of Daily Living   Hygiene/Grooming independent;shower   Oral Hygiene independent   Dress independent;scrubs (behavioral health)   Room Organization independent     "

## 2020-06-25 NOTE — PROGRESS NOTES
"   06/25/20 1700   Group Therapy Session   Group Attendance attended group session   Total Time (minutes) 50   Group Type psychotherapeutic   Patient Participation/Contribution cooperative with task;discussed personal experience with topic;listened actively;offered helpful suggestions to peers   Psychotherapy group goals: Identify and share responses to 4 questions (worries, loves/likes, things to let go, wants).    Miller reports feeling tired due to meds \"but other than that, great!\" He presents as pleasant and engaged. Hyperverbal though related to topic and easily redirected. He participated in the activity and processed with the group. Demonstrated positive group interaction. He reports worry about \"[place], the pandemic,\" and getting country up and running again.   "

## 2020-06-25 NOTE — PLAN OF CARE
Patient participated in topic group focused on self-compassion with an associated writing exercise. He was engaged in discussion and asked thoughtful questions. He completed writing exercise and shared with the group. He shared openly and that it was difficult for him to talk about feelings. Appropriate and pleasant in group.

## 2020-06-26 VITALS
HEART RATE: 88 BPM | RESPIRATION RATE: 16 BRPM | OXYGEN SATURATION: 100 % | WEIGHT: 153 LBS | SYSTOLIC BLOOD PRESSURE: 129 MMHG | TEMPERATURE: 97.9 F | BODY MASS INDEX: 22.59 KG/M2 | DIASTOLIC BLOOD PRESSURE: 69 MMHG

## 2020-06-26 DIAGNOSIS — E11.3293 TYPE 2 DIABETES MELLITUS WITH MILD NONPROLIFERATIVE RETINOPATHY OF BOTH EYES WITHOUT MACULAR EDEMA, UNSPECIFIED WHETHER LONG TERM INSULIN USE (H): Primary | ICD-10-CM

## 2020-06-26 LAB
GLUCOSE BLDC GLUCOMTR-MCNC: 115 MG/DL (ref 70–99)
GLUCOSE BLDC GLUCOMTR-MCNC: 179 MG/DL (ref 70–99)

## 2020-06-26 PROCEDURE — 99239 HOSP IP/OBS DSCHRG MGMT >30: CPT | Mod: 95 | Performed by: PSYCHIATRY & NEUROLOGY

## 2020-06-26 PROCEDURE — 25000132 ZZH RX MED GY IP 250 OP 250 PS 637: Mod: GY | Performed by: PSYCHIATRY & NEUROLOGY

## 2020-06-26 PROCEDURE — 00000146 ZZHCL STATISTIC GLUCOSE BY METER IP

## 2020-06-26 PROCEDURE — 25000131 ZZH RX MED GY IP 250 OP 636 PS 637: Mod: GY | Performed by: PHYSICIAN ASSISTANT

## 2020-06-26 PROCEDURE — 25000132 ZZH RX MED GY IP 250 OP 250 PS 637: Mod: GY | Performed by: PHYSICIAN ASSISTANT

## 2020-06-26 RX ADMIN — TAMSULOSIN HYDROCHLORIDE 0.4 MG: 0.4 CAPSULE ORAL at 08:15

## 2020-06-26 RX ADMIN — CYANOCOBALAMIN TAB 1000 MCG 1000 MCG: 1000 TAB at 08:15

## 2020-06-26 RX ADMIN — POLYETHYLENE GLYCOL 3350 17 G: 17 POWDER, FOR SOLUTION ORAL at 08:15

## 2020-06-26 RX ADMIN — FERROUS SULFATE TAB 325 MG (65 MG ELEMENTAL FE) 325 MG: 325 (65 FE) TAB at 08:15

## 2020-06-26 RX ADMIN — INSULIN ASPART 6 UNITS: 100 INJECTION, SOLUTION INTRAVENOUS; SUBCUTANEOUS at 09:02

## 2020-06-26 RX ADMIN — TACROLIMUS 4 MG: 1 CAPSULE ORAL at 06:53

## 2020-06-26 RX ADMIN — ASPIRIN 81 MG: 81 TABLET ORAL at 08:15

## 2020-06-26 RX ADMIN — PANTOPRAZOLE SODIUM 40 MG: 40 TABLET, DELAYED RELEASE ORAL at 06:53

## 2020-06-26 RX ADMIN — ROSUVASTATIN CALCIUM 5 MG: 5 TABLET, FILM COATED ORAL at 08:15

## 2020-06-26 RX ADMIN — LAMIVUDINE 100 MG: 100 TABLET, FILM COATED ORAL at 08:15

## 2020-06-26 RX ADMIN — MELATONIN 2000 UNITS: at 08:15

## 2020-06-26 ASSESSMENT — ACTIVITIES OF DAILY LIVING (ADL)
LAUNDRY: WITH SUPERVISION
DRESS: INDEPENDENT
ORAL_HYGIENE: INDEPENDENT
HYGIENE/GROOMING: INDEPENDENT

## 2020-06-26 NOTE — PROGRESS NOTES
Patient discharging 6/26/2020 accompanied by his former neighbor Bo and he will be going back to his own place.     Discharge paperwork reviewed and medications reviewed with Frandy Workman who verbalizes understanding.     Patient has verbalized understanding of discharge instructions and medication administration.     Patient states that he is ready for discharge. Felt a bit nervous and anxious about his discharge but still looking forward to it. Denies suicidal ideation and self injurious thoughts. Denies depression. Denies auditory and visual hallucinations. Affect is bright and pleasant on approach. Med compliant. Blood pressure was elevated this morning. Rechecked and was ../69   Pulse 88   Temp 97.9  F (36.6  C) (Oral)   Resp 16   Wt 69.4 kg (153 lb)   SpO2 100%   BMI 22.59 kg/m  .    Copy of AVS reviewed and signed.     Medications from pharmacy received and signed.     Security items received and signed.       Locker items reviewed and signed.      Survey provided

## 2020-06-26 NOTE — PLAN OF CARE
"Miller has been visible in the community areas of the unit the majority of the shift. He has been calmer and quieter than earlier in the week. He denies SI/SIB/AH/VH. He reports a good appetite. He explained he felt \"Sedated this evening from a small dose of Olanzapine given earlier during the day.\" The physician has already discontinued this dose. He plans on discharging in the morning. He is agreeable to future medical appointments arranged by his CTC  and has reviewed them. He is grateful for the care he has received here on [facility]. He has had a full range of affect. Nursing to continue current level of care.   "

## 2020-06-26 NOTE — DISCHARGE INSTRUCTIONS
Behavioral Discharge Planning and Instructions      Summary:  You were admitted on 6/11/2020  due to symptoms of amber..  You were treated by Dr. Bre MD.  Medications were initiated. Your symptoms improved.  You have a large medical team through [facility].  You came to understand your mental health diagnoses and need for care. You were discharged on 6/26/20 from [facility] to Home. You will need to buy a phone right away to participate in your tele-health visits.      Principal Diagnosis:   Bipolar disorder type 1, manic, severe with psychosis  Status post orthotopic liver transplant 11/11/2019 secondary to ESLD related to ESTRADA, hepatocellular carcinoma  Type 2 diabetes, insulin-dependent, with retinopathy  Acute kidney injury on chronic kidney disease stage 3  Benign prostatic hypertrophy  Hyperlipidemia      Health Care Follow-up Appointments:   You have Medicare and signed the Important Message from Medicare required paperwork.  You also have an [facility] Medicare supplement.      You are open with [facility] @ [phone]. They are going to resume care.  You are reporting that you have hired a nurse who is a relative to come live with you - Paty Gaviria @ [phone]. She did not return a call prior to your discharge.      Participate in your new patient psychiatric medication management phone session with Dr. Parish on Monday, June 29, 2020 @ 2:20PM.  Participate in your new patient therapy phone session with Hellen Small on Thursday, July 2, 2020 @ 3PM.  [facility]  [address] [place], [place] [address] [phone]  The Health Unit Coordinator has faxed these discharge instructions to Clinic Office Department Fax: [phone]      Radha Ndiaye is your  Liver Transplant Coordinator. Continue to work closely with her.  Hepatology (Liver) Clinic [facility] [place]   [address]oor [address], [address]  [address] [place] [address]   Appointments: [phone]       You missed your cardiology appointment because you were here. Reschedule  with [facility]. Jemima in Cardiology @ [phone] said that they will be calling you to reschedule.  [facility] [place]   [address], [address]  [address] [place] [address]   Appointments: [phone]       You need follow up with Ophthalmology on discharge . You were supposed to do this in April.  Eye (Ophthalmology) Clinic [facility]   Floor 9, [address]  [address] [place] [address]   Appointments: [phone]       Expect a call from Jannet at the [facility], She is going to call you about your aranesp injections when your hemoglobin is below 11.5. You have wanted this in your home but this is not a viable plan. You will need to come into the clinic.  [facility]  You get infusions at:  [address]  [place], [place]  P: [phone]  F: [phone]        Attend all scheduled appointments with your outpatient providers. Call at least 24 hours in advance if you need to reschedule an appointment to ensure continued access to your outpatient providers.   Major Treatments, Procedures and Findings:  You were provided with: a psychiatric assessment, assessed for medical stability, medication evaluation and/or management, group therapy, milieu management and medical interventions    You were followed by internal medicine while you were here.      Your covid-19 test was negative.  SARS-CoV-2 Virus Specimen Source  06/12/2020  8:46 AM  225    Amanda    SARS-CoV-2 PCR Result  06/12/2020  8:46 AM  225    NEGATIVE    Comment:    SARS-CoV2 (COVID-19) RNA not detected, presumed negative.        Symptoms to Report: elevated mood    Early warning signs can include: sleep disturbances    Safety and Wellness:  Take  all medicines as directed.  Make no changes unless your doctor suggests them.     Follow treatment recommendations.  Refrain from alcohol and non-prescribed drugs.  If there is a concern for safety, call 911.    Resources:   National [place] [employer] Mental Illness (www.[place].zenobia.org): 862.766.3737 or 517-230-4283.      All [facility] Support Groups  To Help Stop the Spread of Covid-19, [facility] Support Groups have Switched to Zoom. Updates and Additions to this List will be Made Regularly.  After you register to attend a support group, you will be emailed the link and password you will need to attend the group. Please check your email. Also, please note, the groups are specifically for those individuals suggested by the group s title. For example, Family Support Group is only for family members and ZENOBIA Connection is only for those who live with a mental illness and are over 18 years old, etc.          The treatment team has appreciated the opportunity to work with you.     If you have any questions or concerns our unit number is [phone]

## 2020-06-26 NOTE — DISCHARGE SUMMARY
"Psychiatric Discharge Summary    Frandy Workman MRN# [id number]   Age: 55 year old YOB: 1965     Date of Admission:  6/11/2020  Date of Discharge:  6/26/2020  Admitting Provider:  Amanda BAY CNP    Discharge Physician:  Elsa Díaz DO    Telemedicine Visit: The patient's condition can be safely assessed and treated via synchronous audio and visual telemedicine encounter.      Start Time: 0930  Stop Time: 0945    Reason for Telemedicine Visit: Covid-19    Originating Site (Patient Location): [place] [address]    Distant Site (Provider Location): Provider Remote Setting    Consent:  The patient/guardian has verbally consented to: the potential risks and benefits of telemedicine (video visit) versus in person care; bill my insurance or make self-payment for services provided; and responsibility for payment of non-covered services.     Mode of Communication:  Video Conference via [employer]    As the provider I attest to compliance with applicable laws and regulations related to telemedicine.            Event Leading to Hospitalization:   Frandy \"Miller\" Ji is a 55-year-old male admitted to [address] on 6/11/2020, arriving on the unit 6/12/2020.  He was admitted on a 72-hour hold through the [facility] ER due to agitation and manic symptoms.  He was at the [facility] and asked someone to call 911 due to low BG.  He took a taxi to the ER.  He exhibited bizarre behaviors and tangential speech.  He has a history of multiple medical issues including liver transplant in 11/2019, chronic kidney disease and insulin-dependent diabetes.  Tacrolimus (immunosuppressant to reduce chances of rejection of transplanted liver) level was low upon admission and he reports missing some doses.  His transplant coordinator and friend have initiated multiple welfare checks.  He has been sending money for first class airfare to [place] to someone " "who represents herself as a 25-year-old woman in [place].  He went to the airport to pick her up the day prior to admission and she was not there.  He said he has never met her, but it is \"in his genes to start a second family.\"  Per the DEC assessment he was recently arrested.  In the ER he was very agitated and was placed in restraints.  Upon admission to the unit he remained agitated and stated he was upset with [employer] for withholding 1 million dollars when he attempted to withdraw money.  Staff report he has had poor boundaries and has been touching staff and patients.         See Admission note by Amanda BAY CNP on 6/13/2020 for additional details.          Diagnoses:     Bipolar disorder type 1, manic, severe with psychosis  Status post orthotopic liver transplant 11/11/2019 secondary to ESLD related to ESTRADA, hepatocellular carcinoma  Type 2 diabetes, insulin-dependent, with retinopathy  Acute kidney injury on chronic kidney disease stage 3  Benign prostatic hypertrophy  Hyperlipidemia         Labs:     Recent Results (from the past 672 hour(s))   Tacrolimus level    Collection Time: 06/03/20  8:58 AM   Result Value Ref Range    Tacrolimus Last Dose Not Provided     Tacrolimus Level 5.8 5.0 - 15.0 ug/L   Phosphorus    Collection Time: 06/03/20  8:59 AM   Result Value Ref Range    Phosphorus 2.7 2.5 - 4.5 mg/dL   Magnesium    Collection Time: 06/03/20  8:59 AM   Result Value Ref Range    Magnesium 2.0 1.6 - 2.3 mg/dL   Hepatic panel    Collection Time: 06/03/20  8:59 AM   Result Value Ref Range    Bilirubin Direct 0.2 0.0 - 0.2 mg/dL    Bilirubin Total 0.8 0.2 - 1.3 mg/dL    Albumin 4.2 3.4 - 5.0 g/dL    Protein Total 7.6 6.8 - 8.8 g/dL    Alkaline Phosphatase 118 40 - 150 U/L    ALT 26 0 - 70 U/L    AST 14 0 - 45 U/L   CBC with platelets    Collection Time: 06/03/20  8:59 AM   Result Value Ref Range    WBC 3.0 (L) 4.0 - 11.0 10e9/L    RBC Count 3.09 (L) 4.4 - 5.9 10e12/L    Hemoglobin 10.7 (L) " 13.3 - 17.7 g/dL    Hematocrit 30.9 (L) 40.0 - 53.0 %     78 - 100 fl    MCH 34.6 (H) 26.5 - 33.0 pg    MCHC 34.6 31.5 - 36.5 g/dL    RDW 13.2 10.0 - 15.0 %    Platelet Count 107 (L) 150 - 450 10e9/L   Basic metabolic panel    Collection Time: 06/03/20  8:59 AM   Result Value Ref Range    Sodium 136 133 - 144 mmol/L    Potassium 4.7 3.4 - 5.3 mmol/L    Chloride 105 94 - 109 mmol/L    Carbon Dioxide 27 20 - 32 mmol/L    Anion Gap 4 3 - 14 mmol/L    Glucose 241 (H) 70 - 99 mg/dL    Urea Nitrogen 50 (H) 7 - 30 mg/dL    Creatinine 1.40 (H) 0.66 - 1.25 mg/dL    GFR Estimate 56 (L) >60 mL/min/[1.73_m2]    GFR Estimate If Black 64 >60 mL/min/[1.73_m2]    Calcium 10.0 8.5 - 10.1 mg/dL   Glucose by meter    Collection Time: 06/11/20  9:22 PM   Result Value Ref Range    Glucose 350 (H) 70 - 99 mg/dL   CBC with platelets differential    Collection Time: 06/11/20 10:18 PM   Result Value Ref Range    WBC 4.3 4.0 - 11.0 10e9/L    RBC Count 2.72 (L) 4.4 - 5.9 10e12/L    Hemoglobin 9.2 (L) 13.3 - 17.7 g/dL    Hematocrit 27.8 (L) 40.0 - 53.0 %     (H) 78 - 100 fl    MCH 33.8 (H) 26.5 - 33.0 pg    MCHC 33.1 31.5 - 36.5 g/dL    RDW 14.4 10.0 - 15.0 %    Platelet Count 95 (L) 150 - 450 10e9/L    Diff Method Manual Differential     % Neutrophils 51.8 %    % Lymphocytes 46.4 %    % Monocytes 0.9 %    % Eosinophils 0.9 %    % Basophils 0.0 %    Absolute Neutrophil 2.2 1.6 - 8.3 10e9/L    Absolute Lymphocytes 2.0 0.8 - 5.3 10e9/L    Absolute Monocytes 0.0 0.0 - 1.3 10e9/L    Absolute Eosinophils 0.0 0.0 - 0.7 10e9/L    Absolute Basophils 0.0 0.0 - 0.2 10e9/L    Poikilocytosis Slight     Ovalocytes Slight     Macrocytes Present     Reactive Lymphs Present     Platelet Estimate Confirming automated cell count    Comprehensive metabolic panel    Collection Time: 06/11/20 10:18 PM   Result Value Ref Range    Sodium 136 133 - 144 mmol/L    Potassium 4.5 3.4 - 5.3 mmol/L    Chloride 103 94 - 109 mmol/L    Carbon Dioxide 28 20 - 32  mmol/L    Anion Gap 5 3 - 14 mmol/L    Glucose 305 (H) 70 - 99 mg/dL    Urea Nitrogen 36 (H) 7 - 30 mg/dL    Creatinine 1.71 (H) 0.66 - 1.25 mg/dL    GFR Estimate 44 (L) >60 mL/min/[1.73_m2]    GFR Estimate If Black 51 (L) >60 mL/min/[1.73_m2]    Calcium 9.2 8.5 - 10.1 mg/dL    Bilirubin Total 0.9 0.2 - 1.3 mg/dL    Albumin 3.9 3.4 - 5.0 g/dL    Protein Total 7.2 6.8 - 8.8 g/dL    Alkaline Phosphatase 122 40 - 150 U/L    ALT 26 0 - 70 U/L    AST 18 0 - 45 U/L   Drug abuse screen 6 urine (tox)    Collection Time: 06/12/20 12:00 AM   Result Value Ref Range    Amphetamine Qual Urine Negative NEG^Negative    Barbiturates Qual Urine Negative NEG^Negative    Benzodiazepine Qual Urine Negative NEG^Negative    Cannabinoids Qual Urine Negative NEG^Negative    Cocaine Qual Urine Negative NEG^Negative    Ethanol Qual Urine Negative NEG^Negative    Opiates Qualitative Urine Negative NEG^Negative   Glucose by meter    Collection Time: 06/12/20  3:34 AM   Result Value Ref Range    Glucose 268 (H) 70 - 99 mg/dL   Asymptomatic COVID-19 Virus (Coronavirus) by PCR    Collection Time: 06/12/20  8:46 AM    Specimen: Nasopharyngeal   Result Value Ref Range    COVID-19 Virus PCR to [facility] - Source Nares     COVID-19 Virus PCR to [facility] - Result       Test received-See reflex to IDDL test SARS CoV2 (COVID-19) Virus RT-PCR   SARS-CoV-2 COVID-19 Virus (Coronavirus) RT-PCR Nasopharyngeal    Collection Time: 06/12/20  8:46 AM    Specimen: Nasopharyngeal   Result Value Ref Range    SARS-CoV-2 Virus Specimen Source Nares     SARS-CoV-2 PCR Result NEGATIVE     SARS-CoV-2 PCR Comment       The Simplexa COVID-19 direct PCR assay by [employer] on the Insights instrument has been   given Emergency Use Authorization (EUA) for the in vitro qualitative detection of RNA from   the SARS-CoV2 virus in nasopharyngeal swabs in viral transport medium from patients with   signs and symptoms of infection who are suspected of COVID-19. Performance is unknown  in   asymptomatic patients.     Glucose by meter    Collection Time: 06/12/20 12:38 PM   Result Value Ref Range    Glucose 297 (H) 70 - 99 mg/dL   Glucose by meter    Collection Time: 06/12/20  4:21 PM   Result Value Ref Range    Glucose 327 (H) 70 - 99 mg/dL   Glucose by meter    Collection Time: 06/12/20  5:57 PM   Result Value Ref Range    Glucose 385 (H) 70 - 99 mg/dL   Glucose by meter    Collection Time: 06/12/20  8:30 PM   Result Value Ref Range    Glucose 179 (H) 70 - 99 mg/dL   Glucose by meter    Collection Time: 06/13/20  2:01 AM   Result Value Ref Range    Glucose 187 (H) 70 - 99 mg/dL   Glucose by meter    Collection Time: 06/13/20  8:06 AM   Result Value Ref Range    Glucose 234 (H) 70 - 99 mg/dL   Tacrolimus level    Collection Time: 06/13/20  8:18 AM   Result Value Ref Range    Tacrolimus Last Dose Not Provided     Tacrolimus Level 4.8 (L) 5.0 - 15.0 ug/L   Basic metabolic panel    Collection Time: 06/13/20  8:18 AM   Result Value Ref Range    Sodium 132 (L) 133 - 144 mmol/L    Potassium 4.3 3.4 - 5.3 mmol/L    Chloride 101 94 - 109 mmol/L    Carbon Dioxide 23 20 - 32 mmol/L    Anion Gap 8 3 - 14 mmol/L    Glucose 245 (H) 70 - 99 mg/dL    Urea Nitrogen 34 (H) 7 - 30 mg/dL    Creatinine 1.44 (H) 0.66 - 1.25 mg/dL    GFR Estimate 54 (L) >60 mL/min/[1.73_m2]    GFR Estimate If Black 62 >60 mL/min/[1.73_m2]    Calcium 8.8 8.5 - 10.1 mg/dL   Vitamin B12    Collection Time: 06/13/20  8:18 AM   Result Value Ref Range    Vitamin B12 682 193 - 986 pg/mL   Hemoglobin A1c    Collection Time: 06/13/20  8:18 AM   Result Value Ref Range    Hemoglobin A1C 6.3 (H) 0 - 5.6 %   Glucose by meter    Collection Time: 06/13/20 10:19 AM   Result Value Ref Range    Glucose 275 (H) 70 - 99 mg/dL   Glucose by meter    Collection Time: 06/13/20 12:44 PM   Result Value Ref Range    Glucose 170 (H) 70 - 99 mg/dL   Glucose by meter    Collection Time: 06/13/20  3:15 PM   Result Value Ref Range    Glucose 193 (H) 70 - 99 mg/dL    Glucose by meter    Collection Time: 06/13/20  4:08 PM   Result Value Ref Range    Glucose 197 (H) 70 - 99 mg/dL   Glucose by meter    Collection Time: 06/13/20  6:05 PM   Result Value Ref Range    Glucose 219 (H) 70 - 99 mg/dL   Glucose by meter    Collection Time: 06/13/20  7:55 PM   Result Value Ref Range    Glucose 261 (H) 70 - 99 mg/dL   Glucose by meter    Collection Time: 06/13/20  9:33 PM   Result Value Ref Range    Glucose 286 (H) 70 - 99 mg/dL   Glucose by meter    Collection Time: 06/14/20  2:48 AM   Result Value Ref Range    Glucose 214 (H) 70 - 99 mg/dL   Glucose by meter    Collection Time: 06/14/20  8:24 AM   Result Value Ref Range    Glucose 151 (H) 70 - 99 mg/dL   Glucose by meter    Collection Time: 06/14/20 10:25 AM   Result Value Ref Range    Glucose 115 (H) 70 - 99 mg/dL   Glucose by meter    Collection Time: 06/14/20 12:12 PM   Result Value Ref Range    Glucose 90 70 - 99 mg/dL   Glucose by meter    Collection Time: 06/14/20  3:04 PM   Result Value Ref Range    Glucose 121 (H) 70 - 99 mg/dL   Glucose by meter    Collection Time: 06/14/20  4:18 PM   Result Value Ref Range    Glucose 109 (H) 70 - 99 mg/dL   Glucose by meter    Collection Time: 06/14/20  5:48 PM   Result Value Ref Range    Glucose 155 (H) 70 - 99 mg/dL   Glucose by meter    Collection Time: 06/14/20  7:52 PM   Result Value Ref Range    Glucose 112 (H) 70 - 99 mg/dL   Glucose by meter    Collection Time: 06/14/20  9:36 PM   Result Value Ref Range    Glucose 135 (H) 70 - 99 mg/dL   Glucose by meter    Collection Time: 06/15/20  2:34 AM   Result Value Ref Range    Glucose 117 (H) 70 - 99 mg/dL   Tacrolimus level    Collection Time: 06/15/20  8:14 AM   Result Value Ref Range    Tacrolimus Last Dose Not Provided     Tacrolimus Level 9.0 5.0 - 15.0 ug/L   Glucose by meter    Collection Time: 06/15/20  8:22 AM   Result Value Ref Range    Glucose 93 70 - 99 mg/dL   Glucose by meter    Collection Time: 06/15/20 10:38 AM   Result Value  Ref Range    Glucose 101 (H) 70 - 99 mg/dL   Glucose by meter    Collection Time: 06/15/20 12:24 PM   Result Value Ref Range    Glucose 73 70 - 99 mg/dL   Glucose by meter    Collection Time: 06/15/20  2:28 PM   Result Value Ref Range    Glucose 139 (H) 70 - 99 mg/dL   Glucose by meter    Collection Time: 06/15/20  5:41 PM   Result Value Ref Range    Glucose 154 (H) 70 - 99 mg/dL   Glucose by meter    Collection Time: 06/15/20  8:01 PM   Result Value Ref Range    Glucose 80 70 - 99 mg/dL   Glucose by meter    Collection Time: 06/15/20  9:37 PM   Result Value Ref Range    Glucose 101 (H) 70 - 99 mg/dL   Glucose by meter    Collection Time: 06/15/20 10:37 PM   Result Value Ref Range    Glucose 141 (H) 70 - 99 mg/dL   Glucose by meter    Collection Time: 06/16/20  3:16 AM   Result Value Ref Range    Glucose 128 (H) 70 - 99 mg/dL   Tacrolimus level    Collection Time: 06/16/20  6:13 AM   Result Value Ref Range    Tacrolimus Last Dose Not Provided     Tacrolimus Level 5.8 5.0 - 15.0 ug/L   Glucose by meter    Collection Time: 06/16/20  7:31 AM   Result Value Ref Range    Glucose 93 70 - 99 mg/dL   Glucose by meter    Collection Time: 06/16/20  9:56 AM   Result Value Ref Range    Glucose 102 (H) 70 - 99 mg/dL   Glucose by meter    Collection Time: 06/16/20 12:14 PM   Result Value Ref Range    Glucose 80 70 - 99 mg/dL   Glucose by meter    Collection Time: 06/16/20  2:01 PM   Result Value Ref Range    Glucose 148 (H) 70 - 99 mg/dL   Glucose by meter    Collection Time: 06/16/20  4:33 PM   Result Value Ref Range    Glucose 81 70 - 99 mg/dL   Glucose by meter    Collection Time: 06/16/20  5:04 PM   Result Value Ref Range    Glucose 99 70 - 99 mg/dL   Glucose by meter    Collection Time: 06/16/20  5:34 PM   Result Value Ref Range    Glucose 146 (H) 70 - 99 mg/dL   Glucose by meter    Collection Time: 06/16/20 10:11 PM   Result Value Ref Range    Glucose 208 (H) 70 - 99 mg/dL   Glucose by meter    Collection Time: 06/17/20   2:00 AM   Result Value Ref Range    Glucose 145 (H) 70 - 99 mg/dL   Glucose by meter    Collection Time: 06/17/20  6:48 AM   Result Value Ref Range    Glucose 84 70 - 99 mg/dL   Glucose by meter    Collection Time: 06/17/20  8:15 AM   Result Value Ref Range    Glucose 92 70 - 99 mg/dL   Glucose by meter    Collection Time: 06/17/20 10:36 AM   Result Value Ref Range    Glucose 94 70 - 99 mg/dL   Glucose by meter    Collection Time: 06/17/20 12:34 PM   Result Value Ref Range    Glucose 89 70 - 99 mg/dL   Glucose by meter    Collection Time: 06/17/20  3:10 PM   Result Value Ref Range    Glucose 97 70 - 99 mg/dL   Glucose by meter    Collection Time: 06/17/20  6:36 PM   Result Value Ref Range    Glucose 167 (H) 70 - 99 mg/dL   Glucose by meter    Collection Time: 06/18/20  1:29 AM   Result Value Ref Range    Glucose 157 (H) 70 - 99 mg/dL   Glucose by meter    Collection Time: 06/18/20  8:14 AM   Result Value Ref Range    Glucose 150 (H) 70 - 99 mg/dL   Glucose by meter    Collection Time: 06/18/20 10:54 AM   Result Value Ref Range    Glucose 281 (H) 70 - 99 mg/dL   Glucose by meter    Collection Time: 06/18/20 12:20 PM   Result Value Ref Range    Glucose 267 (H) 70 - 99 mg/dL   Glucose by meter    Collection Time: 06/18/20  3:06 PM   Result Value Ref Range    Glucose 212 (H) 70 - 99 mg/dL   Glucose by meter    Collection Time: 06/18/20  5:40 PM   Result Value Ref Range    Glucose 174 (H) 70 - 99 mg/dL   Glucose by meter    Collection Time: 06/18/20  9:13 PM   Result Value Ref Range    Glucose 232 (H) 70 - 99 mg/dL   Glucose by meter    Collection Time: 06/18/20 10:16 PM   Result Value Ref Range    Glucose 260 (H) 70 - 99 mg/dL   Glucose by meter    Collection Time: 06/19/20  2:58 AM   Result Value Ref Range    Glucose 230 (H) 70 - 99 mg/dL   CBC with platelets    Collection Time: 06/19/20  7:51 AM   Result Value Ref Range    WBC 5.1 4.0 - 11.0 10e9/L    RBC Count 2.77 (L) 4.4 - 5.9 10e12/L    Hemoglobin 9.3 (L) 13.3 -  17.7 g/dL    Hematocrit 28.7 (L) 40.0 - 53.0 %     (H) 78 - 100 fl    MCH 33.6 (H) 26.5 - 33.0 pg    MCHC 32.4 31.5 - 36.5 g/dL    RDW 15.5 (H) 10.0 - 15.0 %    Platelet Count 116 (L) 150 - 450 10e9/L   Glucose by meter    Collection Time: 06/19/20  8:12 AM   Result Value Ref Range    Glucose 157 (H) 70 - 99 mg/dL   Glucose by meter    Collection Time: 06/19/20 10:46 AM   Result Value Ref Range    Glucose 135 (H) 70 - 99 mg/dL   Glucose by meter    Collection Time: 06/19/20 12:36 PM   Result Value Ref Range    Glucose 113 (H) 70 - 99 mg/dL   Glucose by meter    Collection Time: 06/19/20  2:58 PM   Result Value Ref Range    Glucose 98 70 - 99 mg/dL   Glucose by meter    Collection Time: 06/19/20  5:16 PM   Result Value Ref Range    Glucose 118 (H) 70 - 99 mg/dL   Glucose by meter    Collection Time: 06/19/20 10:14 PM   Result Value Ref Range    Glucose 163 (H) 70 - 99 mg/dL   Glucose by meter    Collection Time: 06/20/20  3:03 AM   Result Value Ref Range    Glucose 152 (H) 70 - 99 mg/dL   Glucose by meter    Collection Time: 06/20/20  8:03 AM   Result Value Ref Range    Glucose 112 (H) 70 - 99 mg/dL   Glucose by meter    Collection Time: 06/20/20 10:39 AM   Result Value Ref Range    Glucose 202 (H) 70 - 99 mg/dL   Glucose by meter    Collection Time: 06/20/20 12:20 PM   Result Value Ref Range    Glucose 163 (H) 70 - 99 mg/dL   Glucose by meter    Collection Time: 06/20/20  2:57 PM   Result Value Ref Range    Glucose 116 (H) 70 - 99 mg/dL   Glucose by meter    Collection Time: 06/20/20  5:42 PM   Result Value Ref Range    Glucose 130 (H) 70 - 99 mg/dL   Glucose by meter    Collection Time: 06/20/20  7:00 PM   Result Value Ref Range    Glucose 174 (H) 70 - 99 mg/dL   Glucose by meter    Collection Time: 06/20/20 10:05 PM   Result Value Ref Range    Glucose 172 (H) 70 - 99 mg/dL   Glucose by meter    Collection Time: 06/21/20  3:15 AM   Result Value Ref Range    Glucose 145 (H) 70 - 99 mg/dL   Glucose by meter     Collection Time: 06/21/20  8:05 AM   Result Value Ref Range    Glucose 135 (H) 70 - 99 mg/dL   Glucose by meter    Collection Time: 06/21/20 10:43 AM   Result Value Ref Range    Glucose 161 (H) 70 - 99 mg/dL   Glucose by meter    Collection Time: 06/21/20 12:17 PM   Result Value Ref Range    Glucose 122 (H) 70 - 99 mg/dL   Glucose by meter    Collection Time: 06/21/20  2:52 PM   Result Value Ref Range    Glucose 109 (H) 70 - 99 mg/dL   Glucose by meter    Collection Time: 06/21/20  5:34 PM   Result Value Ref Range    Glucose 110 (H) 70 - 99 mg/dL   Glucose by meter    Collection Time: 06/21/20  9:36 PM   Result Value Ref Range    Glucose 165 (H) 70 - 99 mg/dL   Glucose by meter    Collection Time: 06/22/20  2:19 AM   Result Value Ref Range    Glucose 166 (H) 70 - 99 mg/dL   Glucose by meter    Collection Time: 06/22/20  7:54 AM   Result Value Ref Range    Glucose 136 (H) 70 - 99 mg/dL   Glucose by meter    Collection Time: 06/22/20 10:55 AM   Result Value Ref Range    Glucose 202 (H) 70 - 99 mg/dL   Glucose by meter    Collection Time: 06/22/20 12:17 PM   Result Value Ref Range    Glucose 145 (H) 70 - 99 mg/dL   Glucose by meter    Collection Time: 06/22/20  3:03 PM   Result Value Ref Range    Glucose 103 (H) 70 - 99 mg/dL   Glucose by meter    Collection Time: 06/22/20  6:00 PM   Result Value Ref Range    Glucose 111 (H) 70 - 99 mg/dL   Glucose by meter    Collection Time: 06/22/20 10:03 PM   Result Value Ref Range    Glucose 186 (H) 70 - 99 mg/dL   Glucose by meter    Collection Time: 06/23/20  3:32 AM   Result Value Ref Range    Glucose 91 70 - 99 mg/dL   Glucose by meter    Collection Time: 06/23/20  8:06 AM   Result Value Ref Range    Glucose 97 70 - 99 mg/dL   Glucose by meter    Collection Time: 06/23/20 10:38 AM   Result Value Ref Range    Glucose 124 (H) 70 - 99 mg/dL   Glucose by meter    Collection Time: 06/23/20 12:17 PM   Result Value Ref Range    Glucose 100 (H) 70 - 99 mg/dL   Glucose by meter     Collection Time: 06/23/20  2:32 PM   Result Value Ref Range    Glucose 119 (H) 70 - 99 mg/dL   Glucose by meter    Collection Time: 06/23/20  5:29 PM   Result Value Ref Range    Glucose 113 (H) 70 - 99 mg/dL   Glucose by meter    Collection Time: 06/23/20  7:47 PM   Result Value Ref Range    Glucose 138 (H) 70 - 99 mg/dL   Glucose by meter    Collection Time: 06/24/20  1:46 AM   Result Value Ref Range    Glucose 92 70 - 99 mg/dL   Glucose by meter    Collection Time: 06/24/20  8:12 AM   Result Value Ref Range    Glucose 75 70 - 99 mg/dL   Glucose by meter    Collection Time: 06/24/20 10:51 AM   Result Value Ref Range    Glucose 110 (H) 70 - 99 mg/dL   Glucose by meter    Collection Time: 06/24/20 12:01 PM   Result Value Ref Range    Glucose 80 70 - 99 mg/dL   Glucose by meter    Collection Time: 06/24/20 12:38 PM   Result Value Ref Range    Glucose 87 70 - 99 mg/dL   Glucose by meter    Collection Time: 06/24/20  2:57 PM   Result Value Ref Range    Glucose 85 70 - 99 mg/dL   Glucose by meter    Collection Time: 06/24/20  5:20 PM   Result Value Ref Range    Glucose 101 (H) 70 - 99 mg/dL   Glucose by meter    Collection Time: 06/24/20 10:06 PM   Result Value Ref Range    Glucose 172 (H) 70 - 99 mg/dL   Glucose by meter    Collection Time: 06/25/20  2:07 AM   Result Value Ref Range    Glucose 179 (H) 70 - 99 mg/dL   Glucose by meter    Collection Time: 06/25/20  8:16 AM   Result Value Ref Range    Glucose 153 (H) 70 - 99 mg/dL   Glucose by meter    Collection Time: 06/25/20 12:24 PM   Result Value Ref Range    Glucose 198 (H) 70 - 99 mg/dL   Glucose by meter    Collection Time: 06/25/20  5:59 PM   Result Value Ref Range    Glucose 164 (H) 70 - 99 mg/dL   Glucose by meter    Collection Time: 06/25/20  8:25 PM   Result Value Ref Range    Glucose 148 (H) 70 - 99 mg/dL   Glucose by meter    Collection Time: 06/26/20  2:59 AM   Result Value Ref Range    Glucose 179 (H) 70 - 99 mg/dL   Glucose by meter    Collection Time:  06/26/20  8:15 AM   Result Value Ref Range    Glucose 115 (H) 70 - 99 mg/dL            Consults:     IM Consult 6/12  Assessment and Recommendations:  Frandy Workman is a 55 year old male w/ a pmhx of ESTRADA cirrhosis c/b ascites, HE and HCC s/p liver transplant 11/11/19, HTN, HLD, DMII, GERD, BPH and CKD who was admitted to [facility] due to decompensated psychiatric illness.     # Bipolar I disorder.  Management per primary team, psychiatry.      S/p orthotopic liver transplant (11/11/19) 2/2 ESLD r/t ESTRADA, HCC. Follows with Dr. Banuelos of hepatology, most recently seen via virtual visit on 5/26/20. LFT's normal. Renal function stable. Seen by Oncology 5/26/20 and no evidence of recurrent HCC on recent imagining. Unclear how compliant pt has been with medications given recent decline in psychiatric state.  - Immunosuppression: tacro 4mg q12h, goal 6-8, most recent 5.8 on 6/3/20                Tacro level in am  - Continue lamivudine for ppx  - Follow up with Hepatology in 3 months as previously directed     DMII with retinopathy. Most recent Hgb A1C 4.8 on 3/4. Recent virtual visit with Endocrine provider on 5/7/20. Last seen by ophthalmology 3/4/20.   - Tresiba 26 units daily  - Novolog 1 unit per 12 grams CHO ac and with snacks  - MSSI  - BG TID ac, at bedtime, and 0200  - Hypoglycemia protocol  - Will need follow up with Ophthalmology on discharge (was supposed to f/u in April)     JERONIMO on CKD stage III. 2/2 diabetic nephropathy and tacrolimus therapy s/p transplant. Bl Cr ~ 1.5, 1.7 on admission s/p 1L LR bolus in ED.  Last seen by nephrology 3/2/20.  - Per nephrology, prefer higher blood pressures to ensure adequate renal perfusion, not on any BP meds  - Continue Iron supplementation for anemia of CKD  - Avoid nephrotoxins  - Is due for follow up with nephrology on discharge  - Repeat BMP in am to ensure improvement in Cr following IVF     BPH. Continue flomax.   HLD. Continue statin.         IM will follow  for am labs as well as BG management.        Margaret Marin PA-C  Hospitalist Service           Hospital Course:   Frandy Workman was admitted to [facility] with attending Elsa Díaz MD on a 72 hour mental health hold, but patient subsequently signed in voluntarily. The patient was placed under status 15 (15 minute checks) to ensure patient safety. Labs obtained as above. Tacrolimus (immunosuppressant to reduce chances of rejection of transplanted liver) level was low upon admission and he reported missing some doses.  His transplant coordinator and friend have initiated multiple welfare checks. In the ER he was very agitated and was placed in restraints.  Upon admission to the unit he remained agitated and staff reported he had had poor boundaries and had been touching staff and patients requiring redirection. He did not not required any further use of restraints. IM was consulted on admission 2/2 medical complexity as above in consult section and co-managed patient in regards to physical health medications and insulin regimen. PTA olanzapine was continued along with PRN clonazepam to target amber. Pt agreed to sign in voluntary 6/12 and increase olanzapine to 10mg to further target poor sleep and disorganization. He continued to report he does not have bipolar disorder and show limited insight into his mental health symptoms but was agreeable to remaining in the hospital due to his belief that he needs medical treatments. He started to show some insight into symptoms of bipolar disorder and was more agreeable to discussing treatments to target his symptoms including increasing olanzapine further to target amber. Olanzapine was increased to 15mg at bedtime and 2.5mg daily, the daily dose made him experience some orthostatic hypotension symptoms and was discontinued before discharge. He continued to make some grandiose statements throughout hospitalization but his pressured speech, decreased sleep need  and inappropriate boundaries improved. He participated in groups more appropriately and was visible in the milieu throughout his stay. He requested to discharge Friday 6/26 as he had a cousin who is an RN coming into town over the weekend to help him transition to being back at home.     Today Frandy Workman reports that he does not have any thoughts of hurting self or others and denies having any symptoms of psychosis. In addition, he has notable risk factors for self-harm, including age, single status and psychosis. However, risk is mitigated by commitment to family, absence of past attempts, ability to volunteer a safety plan and history of seeking help when needed. Therefore, based on all available evidence including the factors cited above, he does not appear to be at imminent risk for self-harm, does not meet criteria for a 72-hr hold, and therefore remains appropriate for ongoing outpatient level of care.     Frandy Workman was discharged to home. At the time of discharge Frandy Workman was determined to not be a danger to himself or others.          Discharge Medications:     Current Discharge Medication List      START taking these medications    Details   clonazePAM (KLONOPIN) 1 MG tablet Take 1 tablet (1 mg) by mouth nightly as needed for sleep  Qty: 30 tablet, Refills: 0    Associated Diagnoses: Rekha (H)      pantoprazole (PROTONIX) 40 MG EC tablet Take 1 tablet (40 mg) by mouth every morning (before breakfast)  Qty: 30 tablet, Refills: 0    Associated Diagnoses: Liver transplant recipient (H)         CONTINUE these medications which have CHANGED    Details   insulin aspart (NOVOLOG PEN) 100 UNIT/ML pen Inject 1-15 Units Subcutaneous 3 times daily (with meals)  Qty: 20 mL, Refills: 0      insulin degludec (TRESIBA FLEXTOUCH) 100 UNIT/ML pen Inject 22 Units Subcutaneous daily  Qty: 15 mL, Refills: 0    Associated Diagnoses: Type 2 diabetes mellitus with stage 3 chronic kidney disease, with  long-term current use of insulin (H)      OLANZapine (ZYPREXA) 15 MG tablet Take 1 tablet (15 mg) by mouth At Bedtime  Qty: 30 tablet, Refills: 0    Associated Diagnoses: Rekha (H)         CONTINUE these medications which have NOT CHANGED    Details   Acetaminophen (TYLENOL) 325 MG CAPS Take 325-650 mg by mouth every 4 hours as needed (pain, fever)  Qty: 100 capsule, Refills: 3    Associated Diagnoses: Liver transplant recipient (H)      Artificial Tear Solution (SM ARTIFICIAL TEARS) SOLN Place 1 drop into the right eye every hour as needed (dry eyes) Apply at least 4 times daily and as needed for dry eye  Qty: 1 Bottle, Refills: 3    Associated Diagnoses: Dry eyes      aspirin 81 MG EC tablet Take 1 tablet (81 mg) by mouth daily    Associated Diagnoses: Liver transplant recipient (H); Type 2 diabetes mellitus without complication, with long-term current use of insulin (H)      econazole nitrate 1 % external cream Apply topically daily To feet and toenails.  Qty: 85 g, Refills: 0    Associated Diagnoses: Tinea pedis of both feet      ferrous sulfate (FEROSUL) 325 (65 Fe) MG tablet Take 1 tablet (325 mg) by mouth daily (with breakfast)  Qty: 90 tablet, Refills: 3    Associated Diagnoses: Liver cirrhosis secondary to ESTRADA (H)      lamiVUDine (EPIVIR) 100 MG tablet Take 1 tablet (100 mg) by mouth daily  Qty: 30 tablet, Refills: 3    Associated Diagnoses: Status post liver transplantation (H); Hepatitis B      magnesium oxide (MAG-OX) 400 MG tablet Take 1 tablet (400 mg) by mouth every evening  Qty: 90 tablet, Refills: 3    Associated Diagnoses: JERONIMO (acute kidney injury) (H)      Multiple Vitamin (THERAVITE PO) Take 1 tablet by mouth every morning       polyethylene glycol (MIRALAX) 17 g packet Take 1 packet by mouth daily      rosuvastatin (CRESTOR) 5 MG tablet Take 1 tablet (5 mg) by mouth daily  Qty: 30 tablet, Refills: 3    Associated Diagnoses: Hyperlipidemia, unspecified hyperlipidemia type      tacrolimus  "(GENERIC EQUIVALENT) 1 MG capsule Take 4 capsules (4 mg) by mouth every 12 hours  Qty: 240 capsule, Refills: 11    Comments: Dose change  Associated Diagnoses: Liver transplant recipient (H)      tamsulosin (FLOMAX) 0.4 MG capsule TAKE 1 CAPSULE(0.4 MG) BY MOUTH DAILY  Qty: 90 capsule, Refills: 3    Associated Diagnoses: Benign prostatic hyperplasia with lower urinary tract symptoms, symptom details unspecified      vitamin B-12 (CYANOCOBALAMIN) 1000 MCG tablet Take 1 tablet (1,000 mcg) by mouth daily  Qty: 30 tablet, Refills: 11    Associated Diagnoses: Liver transplant recipient (H)      vitamin D3 (CHOLECALCIFEROL) 2000 units (50 mcg) tablet Take 1 tablet (2,000 Units) by mouth daily  Qty: 90 tablet, Refills: 3    Associated Diagnoses: Liver transplant recipient (H)      BD VIKTORIA U/F 32G X 4 MM insulin pen needle Use 5 per day  Qty: 300 each, Refills: 3    Associated Diagnoses: Type 2 diabetes mellitus without complication, with long-term current use of insulin (H)      Glucagon 3 MG/DOSE POWD Spray 1 Dose in nostril as needed (for low blood sugar with sedation or emesis (unable to ingest glucose))  Qty: 1 each, Refills: 1    Associated Diagnoses: Type 2 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, unspecified whether long term insulin use (H)      glucose (BD GLUCOSE) 4 g chewable tablet Take 1 tablet by mouth every hour as needed for low blood sugar  Qty: 60 tablet, Refills: 1    Associated Diagnoses: Type 2 diabetes mellitus with other specified complication, with long-term current use of insulin (H)         STOP taking these medications       omeprazole (PRILOSEC) 40 MG DR capsule Comments:   Reason for Stopping:                    Psychiatric Examination:   Appearance: awake, alert, adequately groomed and appears recorded age  Attitude:  cooperative  Eye Contact:  good  Mood:  \"good\"  Affect:  appropriate and in normal range, mood congruent and reactive  Speech: "  clear, coherent and no longer pressured, less hyperverbal   Language: fluent and intact in English  Psychomotor, Gait, Musculoskeletal:  no evidence of tardive dyskinesia, dystonia, or tics  Thought Process:  tangential at times but more logical and linear than on admission  Associations:  no loose associations  Thought Content:  no evidence of suicidal ideation or homicidal ideation and paranoia and grandiose delusions minimally present but much improved from admission  Insight:  partial  Judgement:  fair, adequate for safety  Oriented to:  time, person, and place  Attention Span and Concentration:  good  Recent and Remote Memory:  appears intact, not formally assessed  Fund of Knowledge:  appropriate         Discharge Plan:   Health Care Follow-up Appointments:   You have Medicare and signed the Important Message from Medicare required paperwork.  You also have an [facility] Medicare supplement.        You are open with [facility] @ [phone]. They are going to resume care.  You are reporting that you have hired a nurse who is a relative to come live with you - Paty Gaviria @ [phone]. She did not return a call prior to your discharge.        Participate in your new patient psychiatric medication management phone session with Dr. Parish on Monday, June 29, 2020 @ 2:20PM.  Participate in your new patient therapy phone session with Hellen Small on Thursday, July 2, 2020 @ 3PM.  [facility]  [address] [place] [id number] [phone]  The Health Unit Coordinator has faxed these discharge instructions to Clinic Office Department Fax: [phone]        Radha Ndiaye is your  Liver Transplant Coordinator. Continue to work closely with her.  Hepatology (Liver) Clinic [facility] [place]   [address], [address]  [address] [place] [address]   Appointments: [phone]         You missed your cardiology appointment  because you were here. Reschedule  with [facility]. Jemima in Cardiology @ [phone] said that they will be calling you to reschedule.  [facility] [place]   [address], [address]  [address] [place] [address]   Appointments: [phone]         You need follow up with Ophthalmology on discharge . You were supposed to do this in April.  [facility] ([facility]   Floor 9, Clinic 9  [address] [place] [address]   Appointments: [phone]         Expect a call from Jannet at the [facility], She is going to call you about your aranesp injections when your hemoglobin is below 11.5. You have wanted this in your home but this is not a viable plan. You will need to come into the clinic.  [facility]  [facility] [place]  You get infusions at:  [address]  [place], [place]  P: [phone]  F: [phone]           Attend all scheduled appointments with your outpatient providers. Call at least 24 hours in advance if you need to reschedule an appointment to ensure continued access to your outpatient providers.     Attestation:  Patient has been seen and evaluated by me, Elsa Díaz DO on day of discharge. 40 minutes were spent in coordination of discharge planning.

## 2020-06-26 NOTE — PROGRESS NOTES
I called  Home Care to resume services.  I called the Hepatology clinic to get the Dr. Vin caal on the AVS.         Behavioral Discharge Planning and Instructions      Summary:  You were admitted on 6/11/2020  due to symptoms of amber..  You were treated by Dr. Bre MD.  Medications were initiated. Your symptoms improved.  You have a large medical team through [facility].  You came to understand your mental health diagnoses and need for care. You were discharged on 6/26/20 from [facility]. You will need to buy a phone right away to participate in your tele-health visits.      Principal Diagnosis:   Bipolar disorder type 1, manic, severe with psychosis  Status post orthotopic liver transplant 11/11/2019 secondary to ESLD related to ESTRADA, hepatocellular carcinoma  Type 2 diabetes, insulin-dependent, with retinopathy  Acute kidney injury on chronic kidney disease stage 3  Benign prostatic hypertrophy  Hyperlipidemia      Health Care Follow-up Appointments:   You have Medicare and signed the Important Message from Medicare required paperwork.  You also have an [facility] Medicare supplement.      You are open with [facility] @ [phone]. They are going to resume care.  You are reporting that you have hired a nurse who is a relative to come live with you - Paty Gaviria @ [phone]. She did not return a call prior to your discharge.      Participate in your new patient psychiatric medication management phone session with Dr. Parish on Monday, June 29, 2020 @ 2:20PM.  Participate in your new patient therapy phone session with Hellen Small on Thursday, July 2, 2020 @ 3PM.  [facility]  [address] [place] [id number] [phone]  The Health Unit Coordinator has faxed these discharge instructions to Clinic Office Department Fax: [phone]      Radha dNiaye is your  Liver Transplant Coordinator. Continue to work closely with her.  Hepatology  (Liver) Clinic [facility] [place] [facility] [place]   [address], [address]  [address] [place] [address]   Appointments: [phone]       You missed your cardiology appointment because you were here. Reschedule  with [facility]. Jemima in Cardiology @ [phone] said that they will be calling you to reschedule.  [facility] [place] [facility] [place]   [address]  [address] [place] [address]   Appointments: [phone]       You need follow up with Ophthalmology on discharge . You were supposed to do this in April.  Eye (Ophthalmology) Clinic [facility]   Floor 9, Clinic 9  [address] [place] [address]   Appointments: [phone]       Expect a call from Jannet at the [facility], She is going to call you about your aranesp injections when your hemoglobin is below 11.5. You have wanted this in your home but this is not a viable plan. You will need to come into the clinic.  [facility]  [facility] [place]  You get infusions at:  [address]  [place], [place]  P: [phone]  F: [phone]        Attend all scheduled appointments with your outpatient providers. Call at least 24 hours in advance if you need to reschedule an appointment to ensure continued access to your outpatient providers.   Major Treatments, Procedures and Findings:  You were provided with: a psychiatric assessment, assessed for medical stability, medication evaluation and/or management, group therapy, milieu management and medical interventions    You were followed by internal medicine while you were here.      Your covid-19 test was negative.  SARS-CoV-2 Virus Specimen Source  06/12/2020  8:46 AM  225    Amanda    SARS-CoV-2 PCR Result  06/12/2020  8:46 AM  225    NEGATIVE    Comment:    SARS-CoV2 (COVID-19) RNA not detected, presumed negative.        Symptoms  to Report: elevated mood    Early warning signs can include: sleep disturbances    Safety and Wellness:  Take all medicines as directed.  Make no changes unless your doctor suggests them.     Follow treatment recommendations.  Refrain from alcohol and non-prescribed drugs.  If there is a concern for safety, call 911.    Resources:   National [place] [employer] Mental Illness (www.[place].zenobia.org): 705.386.7876 or 357-096-2550.      All [facility] Support Groups  To Help Stop the Spread of Covid-19, [facility] Support Groups have Switched to Zoom. Updates and Additions to this List will be Made Regularly.  After you register to attend a support group, you will be emailed the link and password you will need to attend the group. Please check your email. Also, please note, the groups are specifically for those individuals suggested by the group s title. For example, Family Support Group is only for family members and [facility] Connection is only for those who live with a mental illness and are over 18 years old, etc.          The treatment team has appreciated the opportunity to work with you.     If you have any questions or concerns our unit number is [phone]

## 2020-06-29 ENCOUNTER — VIRTUAL VISIT (OUTPATIENT)
Dept: NEPHROLOGY | Facility: CLINIC | Age: 55
End: 2020-06-29
Attending: INTERNAL MEDICINE
Payer: MEDICARE

## 2020-06-29 ENCOUNTER — MEDICAL CORRESPONDENCE (OUTPATIENT)
Dept: HEALTH INFORMATION MANAGEMENT | Facility: CLINIC | Age: 55
End: 2020-06-29

## 2020-06-29 DIAGNOSIS — Z94.4 LIVER REPLACED BY TRANSPLANT (H): ICD-10-CM

## 2020-06-29 DIAGNOSIS — D63.1 ANEMIA DUE TO STAGE 4 CHRONIC KIDNEY DISEASE (H): ICD-10-CM

## 2020-06-29 DIAGNOSIS — N18.4 CKD (CHRONIC KIDNEY DISEASE) STAGE 4, GFR 15-29 ML/MIN (H): ICD-10-CM

## 2020-06-29 DIAGNOSIS — Z13.220 LIPID SCREENING: ICD-10-CM

## 2020-06-29 DIAGNOSIS — N18.4 ANEMIA DUE TO STAGE 4 CHRONIC KIDNEY DISEASE (H): ICD-10-CM

## 2020-06-29 DIAGNOSIS — N18.30 CKD (CHRONIC KIDNEY DISEASE) STAGE 3, GFR 30-59 ML/MIN (H): ICD-10-CM

## 2020-06-29 DIAGNOSIS — N18.30 CKD (CHRONIC KIDNEY DISEASE), STAGE III (H): Primary | ICD-10-CM

## 2020-06-29 LAB
ALBUMIN SERPL-MCNC: 3.5 G/DL (ref 3.4–5)
ALP SERPL-CCNC: 115 U/L (ref 40–150)
ALT SERPL W P-5'-P-CCNC: 43 U/L (ref 0–70)
ANION GAP SERPL CALCULATED.3IONS-SCNC: 6 MMOL/L (ref 3–14)
AST SERPL W P-5'-P-CCNC: 26 U/L (ref 0–45)
BILIRUB DIRECT SERPL-MCNC: 0.1 MG/DL (ref 0–0.2)
BILIRUB SERPL-MCNC: 0.4 MG/DL (ref 0.2–1.3)
BUN SERPL-MCNC: 46 MG/DL (ref 7–30)
CALCIUM SERPL-MCNC: 8.2 MG/DL (ref 8.5–10.1)
CHLORIDE SERPL-SCNC: 113 MMOL/L (ref 94–109)
CO2 SERPL-SCNC: 23 MMOL/L (ref 20–32)
CREAT SERPL-MCNC: 1.33 MG/DL (ref 0.66–1.25)
CREAT UR-MCNC: 52 MG/DL
ERYTHROCYTE [DISTWIDTH] IN BLOOD BY AUTOMATED COUNT: 16.7 % (ref 10–15)
FERRITIN SERPL-MCNC: 352 NG/ML (ref 26–388)
GFR SERPL CREATININE-BSD FRML MDRD: 59 ML/MIN/{1.73_M2}
GLUCOSE SERPL-MCNC: 175 MG/DL (ref 70–99)
HCT VFR BLD AUTO: 27.2 % (ref 40–53)
HGB BLD-MCNC: 8.7 G/DL (ref 13.3–17.7)
IRON SATN MFR SERPL: 29 % (ref 15–46)
IRON SERPL-MCNC: 60 UG/DL (ref 35–180)
MAGNESIUM SERPL-MCNC: 1.8 MG/DL (ref 1.6–2.3)
MCH RBC QN AUTO: 34.4 PG (ref 26.5–33)
MCHC RBC AUTO-ENTMCNC: 32 G/DL (ref 31.5–36.5)
MCV RBC AUTO: 108 FL (ref 78–100)
MICROALBUMIN UR-MCNC: 196 MG/L
MICROALBUMIN/CREAT UR: 374.05 MG/G CR (ref 0–17)
PHOSPHATE SERPL-MCNC: 3.7 MG/DL (ref 2.5–4.5)
PLATELET # BLD AUTO: 118 10E9/L (ref 150–450)
POTASSIUM SERPL-SCNC: 4.3 MMOL/L (ref 3.4–5.3)
PROT SERPL-MCNC: 6.9 G/DL (ref 6.8–8.8)
PROT UR-MCNC: 0.47 G/L
PROT/CREAT 24H UR: 0.89 G/G CR (ref 0–0.2)
PTH-INTACT SERPL-MCNC: 61 PG/ML (ref 18–80)
RBC # BLD AUTO: 2.53 10E12/L (ref 4.4–5.9)
SODIUM SERPL-SCNC: 142 MMOL/L (ref 133–144)
TACROLIMUS BLD-MCNC: 3.6 UG/L (ref 5–15)
TIBC SERPL-MCNC: 204 UG/DL (ref 240–430)
TME LAST DOSE: ABNORMAL H
WBC # BLD AUTO: 3.5 10E9/L (ref 4–11)

## 2020-06-29 PROCEDURE — 80197 ASSAY OF TACROLIMUS: CPT | Performed by: SURGERY

## 2020-06-29 PROCEDURE — 82306 VITAMIN D 25 HYDROXY: CPT | Performed by: INTERNAL MEDICINE

## 2020-06-29 PROCEDURE — 82247 BILIRUBIN TOTAL: CPT | Performed by: INTERNAL MEDICINE

## 2020-06-29 PROCEDURE — 83540 ASSAY OF IRON: CPT | Performed by: INTERNAL MEDICINE

## 2020-06-29 PROCEDURE — 84075 ASSAY ALKALINE PHOSPHATASE: CPT | Performed by: INTERNAL MEDICINE

## 2020-06-29 PROCEDURE — 82728 ASSAY OF FERRITIN: CPT | Performed by: INTERNAL MEDICINE

## 2020-06-29 PROCEDURE — 83550 IRON BINDING TEST: CPT | Performed by: INTERNAL MEDICINE

## 2020-06-29 PROCEDURE — 84155 ASSAY OF PROTEIN SERUM: CPT | Performed by: INTERNAL MEDICINE

## 2020-06-29 PROCEDURE — 83735 ASSAY OF MAGNESIUM: CPT | Performed by: INTERNAL MEDICINE

## 2020-06-29 PROCEDURE — 36415 COLL VENOUS BLD VENIPUNCTURE: CPT | Performed by: INTERNAL MEDICINE

## 2020-06-29 PROCEDURE — 84156 ASSAY OF PROTEIN URINE: CPT | Performed by: INTERNAL MEDICINE

## 2020-06-29 PROCEDURE — 83970 ASSAY OF PARATHORMONE: CPT | Performed by: INTERNAL MEDICINE

## 2020-06-29 PROCEDURE — 82043 UR ALBUMIN QUANTITATIVE: CPT | Performed by: INTERNAL MEDICINE

## 2020-06-29 PROCEDURE — 84460 ALANINE AMINO (ALT) (SGPT): CPT | Performed by: INTERNAL MEDICINE

## 2020-06-29 PROCEDURE — 82248 BILIRUBIN DIRECT: CPT | Performed by: INTERNAL MEDICINE

## 2020-06-29 PROCEDURE — 84450 TRANSFERASE (AST) (SGOT): CPT | Performed by: INTERNAL MEDICINE

## 2020-06-29 PROCEDURE — 80069 RENAL FUNCTION PANEL: CPT | Performed by: INTERNAL MEDICINE

## 2020-06-29 PROCEDURE — 85027 COMPLETE CBC AUTOMATED: CPT | Performed by: INTERNAL MEDICINE

## 2020-06-29 ASSESSMENT — PAIN SCALES - GENERAL: PAINLEVEL: NO PAIN (0)

## 2020-06-29 NOTE — LETTER
2020       RE: Frandy Workman  [address] [facility] [address]     Dear Colleague,    Thank you for referring your patient, Frandy Workman, to the [facility] NEPHROLOGY at [facility]. Please see a copy of my visit note below.    Frandy Workman is a 55 year old male who is being evaluated via a billable telephone visit.            Phone call duration: 30 minutes    Eloy Rao MD          Nephrology Clinic    Eloy Rao MD  2020     Name: Frandy Workman  MRN: [id number]  Age: 55 year old  : 1965  Referring provider: Natalie Russell     Assessment and Plan:  1. CKD, stage 3: Prior to recent liver transplant baseline Cr ~1.1 mg/dL with eGFR of 75 ml/min. Post-transplant creatinine is now ~1.3 - 1.5 mg/dL (eGFR of 55 ml/min) with relatively low albuminuria (~375 mg/g).  I suspect he likely has some degree of diabetic changes complicated by chronic changes from past lithium use (for 15 years) and now primarily driven by tacrolimus toxicity.  He is at risk of progressive CKD due to tacrolimus and diabetes as well.      -would favor lower tacrolimus levels as able and potentially changing to another immunosuppressive in the near future  -would prefer higher blood pressures to allow for renal perfusion.  Not currently on antihypertensivves  -no pressing need to start RAAS blockade with ACEi/ARB therapy anytime soon   -RTC in 6 months     2. HTN/Volume status:  Hypotensive in past.    -would favor high blood pressures to assure adequate renal perfusion   -Not on antihypertensives at this visit but later started on lasiz 0 mg daily for worsening lower extremity edema     3.  Electrolytes:  Potassium on higher side in past.  Suspect multifactorial in nature and due to CKD, hyperglycemia, tacrolimus and potentially Bactrim. Not currently an issue at this time.       4. Anemia: Possibly related to Imuran in past, which  has been discontinued.  Iron stores are replete.  Anemia of chronic disease and renal insufficiency also likely contributing.  Hemoglboin now more stable around 9 after initiation of IVELISSE.      - He has standing orders for transfusion once his Hgb falls below 7  -continue iron supplements  - Continue aranesp per anemia management clinic        Reason For Visit:   CKD    HPI:   Frandy Workman is a 55 year old male who presents for CKD f/u.  His past medical history is remarkable for liver transplant (November, 2019) for ESTRADA cirrhosis (complicated by past ascites and hepatic encephalopathy on lactulose and rifaximin in the past), hepatocellular carcinoma (s/p TACE and microwave ablation 01/2019), long standing DM 2 (complicated by nonproliferative diabetic retinopathy, macular edema and peripheral neuropathy), bipolar disorder (previously on lithium for 15 years), HTN, hyperlipidemia and CAD by angiography not requiring PCI.      He denies excessive use of NSAIDs.  He had no history of nephrolithiasis or frequent UTIs.  He has no significant family history of CKD.     In terms of CKD, he appears to have a baseline of ~1.1 mg/dL prior to his liver transplant in November 2019.  Creatinine after transplant was ~1.3 mg/dL at time of discharge and vikram to 3.2 mg/dL at time of his last appointment 12/2/2019.  This was thought to be prerenal from volume depletion.  He was admitted for IVF and creatinine improved to 1.5 mg/dL at tme of discharge.    Today, he feels well and has no specific medical complaints.       Review of Systems:   Pertinent items are noted in HPI or as below, remainder of complete ROS is negative.      Current Outpatient Medications   Medication     clonazePAM (KLONOPIN) 1 MG tablet     ferrous sulfate (FEROSUL) 325 (65 Fe) MG tablet     Injection Device for insulin (INPEN 631-[id number]) CECILIO     magnesium oxide (MAG-OX) 400 MG tablet     Multiple Vitamin (THERAVITE PO)     OLANZapine (ZYPREXA) 15 MG  tablet     pantoprazole (PROTONIX) 40 MG EC tablet     polyethylene glycol (MIRALAX) 17 g packet     vitamin D3 (CHOLECALCIFEROL) 2000 units (50 mcg) tablet     Acetaminophen (TYLENOL) 325 MG CAPS     Artificial Tear Solution (SM ARTIFICIAL TEARS) SOLN     aspirin 81 MG EC tablet     BD VIKTORIA U/F 32G X 4 MM insulin pen needle     ciclopirox (LOPROX) 0.77 % cream     Continuous Blood Gluc  (FREESTYLE CLAUDIA 14 DAY READER) CECILIO     Continuous Blood Gluc  (FREESTYLE CLAUDIA 14 DAY READER) CECILIO     Continuous Blood Gluc Sensor (FREESTYLE CLAUDIA 14 DAY SENSOR) MISC     Continuous Blood Gluc Sensor (FREESTYLE CLAUDIA 14 DAY SENSOR) MISC     furosemide (LASIX) 20 MG tablet     glucose (BD GLUCOSE) 4 g chewable tablet     insulin aspart (NOVOLOG PEN) 100 UNIT/ML pen     insulin degludec (TRESIBA FLEXTOUCH) 100 UNIT/ML pen     lamiVUDine (EPIVIR) 100 MG tablet     order for DME     rosuvastatin (CRESTOR) 5 MG tablet     rosuvastatin (CRESTOR) 5 MG tablet     tacrolimus (GENERIC EQUIVALENT) 1 MG capsule     tamsulosin (FLOMAX) 0.4 MG capsule     vitamin B-12 (CYANOCOBALAMIN) 1000 MCG tablet     Current Facility-Administered Medications   Medication     Aflibercept (EYLEA) injection 2 mg        Allergies:   Codeine and Seasonal allergies      Past Medical History:  Chronic kidney disease, stage 3  Type 2 diabetes mellitus  Bipolar affective disorder   Brow ptosis  Hepatocellular carcinoma  Coronary artery disease   Hypertension   Anemia   Anxiety   Mild recurrent major depression  Mild nonproliferative retinopathy  Gastroesophageal reflux disease   Cholelithiasis   Benign prostatic hypertrophy   Portal vein thrombosis      Past Surgical History:  Liver transplant recipient   Coronary catheterization  Parotidectomy, radical neck dissection  Right eyelid weight placement  Orthopedic surgery    Family History:   Prostate cancer - maternal grandfather, father   Substance abuse - maternal grandfather, maternal grandmother    Colon cancer - father, paternal grandmother  Cancer - mother  Thyroid disease - mother, sister   Stroke - mother  Depression - mother, sister  Asthma - sister      Social History:   Presents to clinic alone  Tobacco Use: Former smoker, 180 pack year history, quit 2017.   Alcohol Use: quit September 1996  PCP: Maximo Tucker      Physical Exam:  Deferred as encounter was a telephone visit.      Laboratory:  CMP  Recent Labs   Lab Test 06/13/20  0818 06/11/20  2218 06/03/20  0859 05/21/20  0723 04/22/20  0833 04/08/20  1048   * 136 136 142 139  --    POTASSIUM 4.3 4.5 4.7 4.1 4.8  --    CHLORIDE 101 103 105 108 109  --    CO2 23 28 27 28 25  --    ANIONGAP 8 5 4 6 5  --    * 305* 241* 187* 258*  --    BUN 34* 36* 50* 40* 42*  --    CR 1.44* 1.71* 1.40* 1.16 1.58*  --    GFRESTIMATED 54* 44* 56* 70 48*  --    GFRESTBLACK 62 51* 64 81 56*  --    DEBORAH 8.8 9.2 10.0 9.4 9.5  --    MAG  --   --  2.0 1.7 2.1 2.1   PHOS  --   --  2.7 3.0 3.4 3.4   PROTTOTAL  --  7.2 7.6 7.3 8.1 8.3   ALBUMIN  --  3.9 4.2 4.1 4.3 4.6   BILITOTAL  --  0.9 0.8 0.3 0.4 0.6   ALKPHOS  --  122 118 107 130 147   AST  --  18 14 13 14 26   ALT  --  26 26 20 19 25     CBC  Recent Labs   Lab Test 06/29/20  1005 06/19/20  0751 06/11/20 2218 06/03/20  0859   HGB 8.7* 9.3* 9.2* 10.7*   WBC 3.5* 5.1 4.3 3.0*   RBC 2.53* 2.77* 2.72* 3.09*   HCT 27.2* 28.7* 27.8* 30.9*   * 104* 102* 100   MCH 34.4* 33.6* 33.8* 34.6*   MCHC 32.0 32.4 33.1 34.6   RDW 16.7* 15.5* 14.4 13.2   * 116* 95* 107*     INR  Recent Labs   Lab Test 01/24/20  0837 12/05/19  0824 11/16/19  0648 11/15/19  0449  11/12/19  1400  11/12/19  0346  11/11/19  1651 11/11/19  1600   INR 1.09 1.14 1.11 1.12   < > 1.46*   < > 1.47*   < >  --  1.60*   PTT  --   --   --   --   --  39*  --  57*  --  48* 48*    < > = values in this interval not displayed.     ABG  Recent Labs   Lab Test 11/12/19  1513 11/12/19  1400 11/11/19  1735 11/11/19  1651 11/11/19  1600   PH 7.37 7.36   --  7.41 7.48*   PCO2 42 43  --  42 36   PO2 114* 92  --  81 98   HCO3 24 24  --  27 26   O2PER 44% 41% 30 32 32.0      URINE STUDIES  Recent Labs   Lab Test 12/04/19  1400 11/15/19  1123 07/08/19  1017 02/04/19  0705   COLOR Light Yellow Light Yellow Yellow Yellow   APPEARANCE Clear Clear Clear Clear   URINEGLC 30* 300* >499* 150*   URINEBILI Negative Negative Negative Negative   URINEKETONE Negative Negative Negative Negative   SG 1.009 1.007 1.017 1.016   UBLD Negative Small* Negative Negative   URINEPH 5.0 6.5 5.0 5.0   PROTEIN 30* Negative Negative Negative   NITRITE Negative Negative Negative Negative   LEUKEST Negative Negative Negative Negative   RBCU 0 0 <1 1   WBCU 1 <1 3 1     Recent Labs   Lab Test 03/02/20  1013 12/02/19  1040 07/08/19  1017 02/04/19  0705   UTPG 0.29* 1.22* 0.11 0.14     PTH  Recent Labs   Lab Test 07/08/19  1020   PTHI 54     IRON STUDIES   Recent Labs   Lab Test 05/21/20  0723 04/22/20  0833 03/09/20  0823 01/27/20  1340 12/02/19  1034 12/28/18  1108 09/15/18  1215 06/09/17  1250   IRON 72 65 92  --  88  --  52  --    * 215* 231*  --  248  --  403  --    IRONSAT 38 30 40  --  36  --  13*  --    QUAN 344 643* 859* 690* 1,353* 90 10* 450*        Frandy Workman is a 55 year old male who is being evaluated via a billable telephone visit.          Phone call duration: 30 minutes  Total time spent was 30 minutes, and more than 50% of face to face time was spent in counseling and/or coordination of care regarding principles of multidisciplinary care, medication management, and chronic kidney disease education.  Eloy Rao MD

## 2020-06-29 NOTE — PROGRESS NOTES
"Frandy Workman is a 55 year old male who is being evaluated via a billable telephone visit.      The patient has been notified of following:     \"This telephone visit will be conducted via a call between you and your physician/provider. We have found that certain health care needs can be provided without the need for a physical exam.  This service lets us provide the care you need with a short phone conversation.  If a prescription is necessary we can send it directly to your pharmacy.  If lab work is needed we can place an order for that and you can then stop by our lab to have the test done at a later time.    Telephone visits are billed at different rates depending on your insurance coverage. During this emergency period, for some insurers they may be billed the same as an in-person visit.  Please reach out to your insurance provider with any questions.    If during the course of the call the physician/provider feels a telephone visit is not appropriate, you will not be charged for this service.\"    Patient has given verbal consent for Telephone visit?  Yes    What phone number would you like to be contacted at? [phone]    How would you like to obtain your AVS? Mail a copy    Phone call duration: 30 minutes    Eloy Rao MD      "

## 2020-06-29 NOTE — PROGRESS NOTES
Nephrology Clinic    Eloy Rao MD  2020     Name: Frandy Workman  MRN: [id number]  Age: 55 year old  : 1965  Referring provider: Natalie Russell     Assessment and Plan:  1. CKD, stage 3: Prior to recent liver transplant baseline Cr ~1.1 mg/dL with eGFR of 75 ml/min. Post-transplant creatinine is now ~1.3 - 1.5 mg/dL (eGFR of 55 ml/min) with relatively low albuminuria (~375 mg/g).  I suspect he likely has some degree of diabetic changes complicated by chronic changes from past lithium use (for 15 years) and now primarily driven by tacrolimus toxicity.  He is at risk of progressive CKD due to tacrolimus and diabetes as well.      -would favor lower tacrolimus levels as able and potentially changing to another immunosuppressive in the near future  -would prefer higher blood pressures to allow for renal perfusion.  Not currently on antihypertensivves  -no pressing need to start RAAS blockade with ACEi/ARB therapy anytime soon   -RTC in 6 months     2. HTN/Volume status:  Hypotensive in past.    -would favor high blood pressures to assure adequate renal perfusion   -Not on antihypertensives at this visit but later started on lasiz 0 mg daily for worsening lower extremity edema     3.  Electrolytes:  Potassium on higher side in past.  Suspect multifactorial in nature and due to CKD, hyperglycemia, tacrolimus and potentially Bactrim. Not currently an issue at this time.       4. Anemia: Possibly related to Imuran in past, which has been discontinued.  Iron stores are replete.  Anemia of chronic disease and renal insufficiency also likely contributing.  Hemoglboin now more stable around 9 after initiation of IVELISSE.      - He has standing orders for transfusion once his Hgb falls below 7  -continue iron supplements  - Continue aranesp per anemia management clinic        Reason For Visit:   CKD    HPI:   Frandy Workman is a 55 year old male who presents for CKD f/u.  His past  medical history is remarkable for liver transplant (November, 2019) for ESTRADA cirrhosis (complicated by past ascites and hepatic encephalopathy on lactulose and rifaximin in the past), hepatocellular carcinoma (s/p TACE and microwave ablation 01/2019), long standing DM 2 (complicated by nonproliferative diabetic retinopathy, macular edema and peripheral neuropathy), bipolar disorder (previously on lithium for 15 years), HTN, hyperlipidemia and CAD by angiography not requiring PCI.      He denies excessive use of NSAIDs.  He had no history of nephrolithiasis or frequent UTIs.  He has no significant family history of CKD.     In terms of CKD, he appears to have a baseline of ~1.1 mg/dL prior to his liver transplant in November 2019.  Creatinine after transplant was ~1.3 mg/dL at time of discharge and vikram to 3.2 mg/dL at time of his last appointment 12/2/2019.  This was thought to be prerenal from volume depletion.  He was admitted for IVF and creatinine improved to 1.5 mg/dL at tme of discharge.    Today, he feels well and has no specific medical complaints.       Review of Systems:   Pertinent items are noted in HPI or as below, remainder of complete ROS is negative.      Current Outpatient Medications   Medication     clonazePAM (KLONOPIN) 1 MG tablet     ferrous sulfate (FEROSUL) 325 (65 Fe) MG tablet     Injection Device for insulin (INPEN [id number]) CECILIO     magnesium oxide (MAG-OX) 400 MG tablet     Multiple Vitamin (THERAVITE PO)     OLANZapine (ZYPREXA) 15 MG tablet     pantoprazole (PROTONIX) 40 MG EC tablet     polyethylene glycol (MIRALAX) 17 g packet     vitamin D3 (CHOLECALCIFEROL) 2000 units (50 mcg) tablet     Acetaminophen (TYLENOL) 325 MG CAPS     Artificial Tear Solution (SM ARTIFICIAL TEARS) SOLN     aspirin 81 MG EC tablet     BD VIKTORIA U/F 32G X 4 MM insulin pen needle     ciclopirox (LOPROX) 0.77 % cream     Continuous Blood Gluc  (FREESTYLE CLAUDIA 14 DAY READER) CECILIO     Continuous  Blood Gluc  (FREESTYLE CLAUDIA 14 DAY READER) CECILIO     Continuous Blood Gluc Sensor (FREESTYLE CLAUDIA 14 DAY SENSOR) MISC     Continuous Blood Gluc Sensor (FREESTYLE CLAUDIA 14 DAY SENSOR) MISC     furosemide (LASIX) 20 MG tablet     glucose (BD GLUCOSE) 4 g chewable tablet     insulin aspart (NOVOLOG PEN) 100 UNIT/ML pen     insulin degludec (TRESIBA FLEXTOUCH) 100 UNIT/ML pen     lamiVUDine (EPIVIR) 100 MG tablet     order for DME     rosuvastatin (CRESTOR) 5 MG tablet     rosuvastatin (CRESTOR) 5 MG tablet     tacrolimus (GENERIC EQUIVALENT) 1 MG capsule     tamsulosin (FLOMAX) 0.4 MG capsule     vitamin B-12 (CYANOCOBALAMIN) 1000 MCG tablet     Current Facility-Administered Medications   Medication     Aflibercept (EYLEA) injection 2 mg        Allergies:   Codeine and Seasonal allergies      Past Medical History:  Chronic kidney disease, stage 3  Type 2 diabetes mellitus  Bipolar affective disorder   Brow ptosis  Hepatocellular carcinoma  Coronary artery disease   Hypertension   Anemia   Anxiety   Mild recurrent major depression  Mild nonproliferative retinopathy  Gastroesophageal reflux disease   Cholelithiasis   Benign prostatic hypertrophy   Portal vein thrombosis      Past Surgical History:  Liver transplant recipient   Coronary catheterization  Parotidectomy, radical neck dissection  Right eyelid weight placement  Orthopedic surgery    Family History:   Prostate cancer - maternal grandfather, father   Substance abuse - maternal grandfather, maternal grandmother   Colon cancer - father, paternal grandmother  Cancer - mother  Thyroid disease - mother, sister   Stroke - mother  Depression - mother, sister  Asthma - sister      Social History:   Presents to clinic alone  Tobacco Use: Former smoker, 180 pack year history, quit 2017.   Alcohol Use: quit September 1996  PCP: Maximo Tucker      Physical Exam:  Deferred as encounter was a telephone visit.      Laboratory:  CMP  Recent Labs   Lab Test  06/13/20  0818 06/11/20 2218 06/03/20  0859 05/21/20  0723 04/22/20  0833 04/08/20  1048   * 136 136 142 139  --    POTASSIUM 4.3 4.5 4.7 4.1 4.8  --    CHLORIDE 101 103 105 108 109  --    CO2 23 28 27 28 25  --    ANIONGAP 8 5 4 6 5  --    * 305* 241* 187* 258*  --    BUN 34* 36* 50* 40* 42*  --    CR 1.44* 1.71* 1.40* 1.16 1.58*  --    GFRESTIMATED 54* 44* 56* 70 48*  --    GFRESTBLACK 62 51* 64 81 56*  --    DEBORAH 8.8 9.2 10.0 9.4 9.5  --    MAG  --   --  2.0 1.7 2.1 2.1   PHOS  --   --  2.7 3.0 3.4 3.4   PROTTOTAL  --  7.2 7.6 7.3 8.1 8.3   ALBUMIN  --  3.9 4.2 4.1 4.3 4.6   BILITOTAL  --  0.9 0.8 0.3 0.4 0.6   ALKPHOS  --  122 118 107 130 147   AST  --  18 14 13 14 26   ALT  --  26 26 20 19 25     CBC  Recent Labs   Lab Test 06/29/20  1005 06/19/20  0751 06/11/20 2218 06/03/20  0859   HGB 8.7* 9.3* 9.2* 10.7*   WBC 3.5* 5.1 4.3 3.0*   RBC 2.53* 2.77* 2.72* 3.09*   HCT 27.2* 28.7* 27.8* 30.9*   * 104* 102* 100   MCH 34.4* 33.6* 33.8* 34.6*   MCHC 32.0 32.4 33.1 34.6   RDW 16.7* 15.5* 14.4 13.2   * 116* 95* 107*     INR  Recent Labs   Lab Test 01/24/20  0837 12/05/19  0824 11/16/19  0648 11/15/19  0449  11/12/19  1400  11/12/19  0346  11/11/19  1651 11/11/19  1600   INR 1.09 1.14 1.11 1.12   < > 1.46*   < > 1.47*   < >  --  1.60*   PTT  --   --   --   --   --  39*  --  57*  --  48* 48*    < > = values in this interval not displayed.     ABG  Recent Labs   Lab Test 11/12/19  1513 11/12/19  1400 11/11/19  1735 11/11/19  1651 11/11/19  1600   PH 7.37 7.36  --  7.41 7.48*   PCO2 42 43  --  42 36   PO2 114* 92  --  81 98   HCO3 24 24  --  27 26   O2PER 44% 41% 30 32 32.0      URINE STUDIES  Recent Labs   Lab Test 12/04/19  1400 11/15/19  1123 07/08/19  1017 02/04/19  0705   COLOR Light Yellow Light Yellow Yellow Yellow   APPEARANCE Clear Clear Clear Clear   URINEGLC 30* 300* >499* 150*   URINEBILI Negative Negative Negative Negative   URINEKETONE Negative Negative Negative Negative   SG  "1.009 1.007 1.017 1.016   UBLD Negative Small* Negative Negative   URINEPH 5.0 6.5 5.0 5.0   PROTEIN 30* Negative Negative Negative   NITRITE Negative Negative Negative Negative   LEUKEST Negative Negative Negative Negative   RBCU 0 0 <1 1   WBCU 1 <1 3 1     Recent Labs   Lab Test 03/02/20  1013 12/02/19  1040 07/08/19  1017 02/04/19  0705   UTPG 0.29* 1.22* 0.11 0.14     PTH  Recent Labs   Lab Test 07/08/19  1020   PTHI 54     IRON STUDIES   Recent Labs   Lab Test 05/21/20  0723 04/22/20  0833 03/09/20  0823 01/27/20  1340 12/02/19  1034 12/28/18  1108 09/15/18  1215 06/09/17  1250   IRON 72 65 92  --  88  --  52  --    * 215* 231*  --  248  --  403  --    IRONSAT 38 30 40  --  36  --  13*  --    QUAN 344 643* 859* 690* 1,353* 90 10* 450*        Frandy Workman is a 55 year old male who is being evaluated via a billable telephone visit.      The patient has been notified of following:     \"This telephone visit will be conducted via a call between you and your physician/provider. We have found that certain health care needs can be provided without the need for a physical exam.  This service lets us provide the care you need with a short phone conversation.  If a prescription is necessary we can send it directly to your pharmacy.  If lab work is needed we can place an order for that and you can then stop by our lab to have the test done at a later time.    Telephone visits are billed at different rates depending on your insurance coverage. During this emergency period, for some insurers they may be billed the same as an in-person visit.  Please reach out to your insurance provider with any questions.    If during the course of the call the physician/provider feels a telephone visit is not appropriate, you will not be charged for this service.\"    Patient has given verbal consent for Telephone visit?  Yes    What phone number would you like to be contacted at?     How would you like to obtain your AVS? " Jordan    Phone call duration: 30 minutes  Total time spent was 30 minutes, and more than 50% of face to face time was spent in counseling and/or coordination of care regarding principles of multidisciplinary care, medication management, and chronic kidney disease education.  Eloy Rao MD

## 2020-06-30 ENCOUNTER — TELEPHONE (OUTPATIENT)
Dept: PSYCHIATRY | Facility: CLINIC | Age: 55
End: 2020-06-30

## 2020-06-30 DIAGNOSIS — Z94.4 LIVER TRANSPLANT RECIPIENT (H): ICD-10-CM

## 2020-06-30 LAB
DEPRECATED CALCIDIOL+CALCIFEROL SERPL-MC: <67 UG/L (ref 20–75)
VITAMIN D2 SERPL-MCNC: <5 UG/L
VITAMIN D3 SERPL-MCNC: 62 UG/L

## 2020-06-30 RX ORDER — TACROLIMUS 1 MG/1
CAPSULE ORAL
Qty: 270 CAPSULE | Refills: 11 | Status: SHIPPED | OUTPATIENT
Start: 2020-06-30 | End: 2020-10-06

## 2020-06-30 NOTE — TELEPHONE ENCOUNTER
Pt requesting Rx for Clonazepam 0.5mg.  Pt states 1mg makes him feel hungover and he does not want to cut the 1mg in half  Thank you!  Desirae Rodriguez Zena  [facility]

## 2020-06-30 NOTE — TELEPHONE ENCOUNTER
ISSUE:   Tacrolimus IR level 3.6 on 6/29/2020, goal 6 dose 4 mg every 12 hours.    PLAN:   Please call patient and confirm this was an accurate 12-hour trough. Verify Tacrolimus IR dose 4 mg every 12 hours. Confirm no new medications or illness. Confirm no missed doses. If accurate trough and accurate dose, increase Tacrolimus IR dose to 4 mg AM and 5 mg PM and repeat labs in 2 weeks    OUTCOME:   Spoke with patient, they confirm accurate trough level and current dose 4 mg BID. Patient confirmed dose change to 4 mg am, 5 mg pm and to repeat labs in 2 weeks. Orders sent to preferred pharmacy for dose change and lab for repeat labs. Patient voiced understanding of plan.     Deena Marshall LPN

## 2020-07-01 ENCOUNTER — INFUSION THERAPY VISIT (OUTPATIENT)
Dept: INFUSION THERAPY | Facility: CLINIC | Age: 55
End: 2020-07-01
Payer: MEDICARE

## 2020-07-01 ENCOUNTER — APPOINTMENT (OUTPATIENT)
Dept: CT IMAGING | Facility: CLINIC | Age: 55
End: 2020-07-01
Attending: EMERGENCY MEDICINE
Payer: COMMERCIAL

## 2020-07-01 ENCOUNTER — TELEPHONE (OUTPATIENT)
Dept: TRANSPLANT | Facility: CLINIC | Age: 55
End: 2020-07-01

## 2020-07-01 ENCOUNTER — APPOINTMENT (OUTPATIENT)
Dept: GENERAL RADIOLOGY | Facility: CLINIC | Age: 55
End: 2020-07-01
Attending: STUDENT IN AN ORGANIZED HEALTH CARE EDUCATION/TRAINING PROGRAM
Payer: COMMERCIAL

## 2020-07-01 ENCOUNTER — HOSPITAL ENCOUNTER (EMERGENCY)
Facility: CLINIC | Age: 55
Discharge: HOME | End: 2020-07-01
Attending: EMERGENCY MEDICINE | Admitting: EMERGENCY MEDICINE
Payer: COMMERCIAL

## 2020-07-01 VITALS
DIASTOLIC BLOOD PRESSURE: 44 MMHG | TEMPERATURE: 98.2 F | SYSTOLIC BLOOD PRESSURE: 101 MMHG | HEART RATE: 89 BPM | OXYGEN SATURATION: 100 % | RESPIRATION RATE: 18 BRPM | BODY MASS INDEX: 22.92 KG/M2 | WEIGHT: 155.2 LBS

## 2020-07-01 VITALS
DIASTOLIC BLOOD PRESSURE: 72 MMHG | RESPIRATION RATE: 16 BRPM | TEMPERATURE: 98.7 F | SYSTOLIC BLOOD PRESSURE: 136 MMHG | HEART RATE: 89 BPM | BODY MASS INDEX: 22.96 KG/M2 | WEIGHT: 155 LBS | OXYGEN SATURATION: 99 % | HEIGHT: 69 IN

## 2020-07-01 DIAGNOSIS — N18.4 CKD (CHRONIC KIDNEY DISEASE) STAGE 4, GFR 15-29 ML/MIN (H): Primary | ICD-10-CM

## 2020-07-01 DIAGNOSIS — S20.219A CONTUSION OF CHEST WALL, UNSPECIFIED LATERALITY, INITIAL ENCOUNTER: ICD-10-CM

## 2020-07-01 DIAGNOSIS — D63.1 ANEMIA OF CHRONIC RENAL FAILURE, STAGE 4 (SEVERE) (H): ICD-10-CM

## 2020-07-01 DIAGNOSIS — Z94.4 LIVER REPLACED BY TRANSPLANT (H): ICD-10-CM

## 2020-07-01 DIAGNOSIS — N18.4 ANEMIA OF CHRONIC RENAL FAILURE, STAGE 4 (SEVERE) (H): ICD-10-CM

## 2020-07-01 DIAGNOSIS — Z13.220 LIPID SCREENING: ICD-10-CM

## 2020-07-01 LAB
ALBUMIN SERPL-MCNC: 3.4 G/DL (ref 3.4–5)
ALBUMIN SERPL-MCNC: 3.6 G/DL (ref 3.4–5)
ALP SERPL-CCNC: 110 U/L (ref 40–150)
ALP SERPL-CCNC: 125 U/L (ref 40–150)
ALT SERPL W P-5'-P-CCNC: 31 U/L (ref 0–70)
ALT SERPL W P-5'-P-CCNC: 37 U/L (ref 0–70)
ANION GAP SERPL CALCULATED.3IONS-SCNC: 2 MMOL/L (ref 3–14)
ANION GAP SERPL CALCULATED.3IONS-SCNC: 3 MMOL/L (ref 3–14)
AST SERPL W P-5'-P-CCNC: 14 U/L (ref 0–45)
AST SERPL W P-5'-P-CCNC: 19 U/L (ref 0–45)
BASOPHILS # BLD AUTO: 0 10E9/L (ref 0–0.2)
BASOPHILS NFR BLD AUTO: 0.6 %
BILIRUB DIRECT SERPL-MCNC: <0.1 MG/DL (ref 0–0.2)
BILIRUB SERPL-MCNC: 0.3 MG/DL (ref 0.2–1.3)
BILIRUB SERPL-MCNC: 0.3 MG/DL (ref 0.2–1.3)
BUN SERPL-MCNC: 37 MG/DL (ref 7–30)
BUN SERPL-MCNC: 38 MG/DL (ref 7–30)
CALCIUM SERPL-MCNC: 8.5 MG/DL (ref 8.5–10.1)
CALCIUM SERPL-MCNC: 8.8 MG/DL (ref 8.5–10.1)
CHLORIDE SERPL-SCNC: 112 MMOL/L (ref 94–109)
CHLORIDE SERPL-SCNC: 114 MMOL/L (ref 94–109)
CO2 SERPL-SCNC: 26 MMOL/L (ref 20–32)
CO2 SERPL-SCNC: 27 MMOL/L (ref 20–32)
CREAT SERPL-MCNC: 1.48 MG/DL (ref 0.66–1.25)
CREAT SERPL-MCNC: 1.51 MG/DL (ref 0.66–1.25)
DIFFERENTIAL METHOD BLD: ABNORMAL
EOSINOPHIL # BLD AUTO: 0.1 10E9/L (ref 0–0.7)
EOSINOPHIL NFR BLD AUTO: 1.8 %
ERYTHROCYTE [DISTWIDTH] IN BLOOD BY AUTOMATED COUNT: 16 % (ref 10–15)
ERYTHROCYTE [DISTWIDTH] IN BLOOD BY AUTOMATED COUNT: 16.2 % (ref 10–15)
GFR SERPL CREATININE-BSD FRML MDRD: 51 ML/MIN/{1.73_M2}
GFR SERPL CREATININE-BSD FRML MDRD: 52 ML/MIN/{1.73_M2}
GLUCOSE SERPL-MCNC: 245 MG/DL (ref 70–99)
GLUCOSE SERPL-MCNC: 248 MG/DL (ref 70–99)
HCT VFR BLD AUTO: 25.1 % (ref 40–53)
HCT VFR BLD AUTO: 27.7 % (ref 40–53)
HGB BLD-MCNC: 7.9 G/DL (ref 13.3–17.7)
HGB BLD-MCNC: 9 G/DL (ref 13.3–17.7)
IMM GRANULOCYTES # BLD: 0 10E9/L (ref 0–0.4)
IMM GRANULOCYTES NFR BLD: 0.9 %
INTERPRETATION ECG - MUSE: NORMAL
LYMPHOCYTES # BLD AUTO: 1.6 10E9/L (ref 0.8–5.3)
LYMPHOCYTES NFR BLD AUTO: 46.7 %
MAGNESIUM SERPL-MCNC: 1.8 MG/DL (ref 1.6–2.3)
MCH RBC QN AUTO: 33.3 PG (ref 26.5–33)
MCH RBC QN AUTO: 33.7 PG (ref 26.5–33)
MCHC RBC AUTO-ENTMCNC: 31.5 G/DL (ref 31.5–36.5)
MCHC RBC AUTO-ENTMCNC: 32.5 G/DL (ref 31.5–36.5)
MCV RBC AUTO: 104 FL (ref 78–100)
MCV RBC AUTO: 106 FL (ref 78–100)
MONOCYTES # BLD AUTO: 0.2 10E9/L (ref 0–1.3)
MONOCYTES NFR BLD AUTO: 6.3 %
NEUTROPHILS # BLD AUTO: 1.5 10E9/L (ref 1.6–8.3)
NEUTROPHILS NFR BLD AUTO: 43.7 %
NRBC # BLD AUTO: 0 10*3/UL
NRBC BLD AUTO-RTO: 0 /100
PHOSPHATE SERPL-MCNC: 2.6 MG/DL (ref 2.5–4.5)
PLATELET # BLD AUTO: 124 10E9/L (ref 150–450)
PLATELET # BLD AUTO: 88 10E9/L (ref 150–450)
POTASSIUM SERPL-SCNC: 5.1 MMOL/L (ref 3.4–5.3)
POTASSIUM SERPL-SCNC: 5.1 MMOL/L (ref 3.4–5.3)
PROT SERPL-MCNC: 6.4 G/DL (ref 6.8–8.8)
PROT SERPL-MCNC: 7 G/DL (ref 6.8–8.8)
RBC # BLD AUTO: 2.37 10E12/L (ref 4.4–5.9)
RBC # BLD AUTO: 2.67 10E12/L (ref 4.4–5.9)
SODIUM SERPL-SCNC: 142 MMOL/L (ref 133–144)
SODIUM SERPL-SCNC: 142 MMOL/L (ref 133–144)
TACROLIMUS BLD-MCNC: 6.1 UG/L (ref 5–15)
TME LAST DOSE: NORMAL H
TROPONIN I SERPL-MCNC: <0.015 UG/L (ref 0–0.04)
WBC # BLD AUTO: 3.4 10E9/L (ref 4–11)
WBC # BLD AUTO: 3.8 10E9/L (ref 4–11)

## 2020-07-01 PROCEDURE — 71120 X-RAY EXAM BREASTBONE 2/>VWS: CPT

## 2020-07-01 PROCEDURE — 99285 EMERGENCY DEPT VISIT HI MDM: CPT | Mod: GC | Performed by: EMERGENCY MEDICINE

## 2020-07-01 PROCEDURE — 76705 ECHO EXAM OF ABDOMEN: CPT | Performed by: EMERGENCY MEDICINE

## 2020-07-01 PROCEDURE — 74176 CT ABD & PELVIS W/O CONTRAST: CPT

## 2020-07-01 PROCEDURE — 84100 ASSAY OF PHOSPHORUS: CPT | Performed by: SURGERY

## 2020-07-01 PROCEDURE — 99285 EMERGENCY DEPT VISIT HI MDM: CPT | Mod: 25 | Performed by: EMERGENCY MEDICINE

## 2020-07-01 PROCEDURE — 85025 COMPLETE CBC W/AUTO DIFF WBC: CPT | Performed by: EMERGENCY MEDICINE

## 2020-07-01 PROCEDURE — 80048 BASIC METABOLIC PNL TOTAL CA: CPT | Performed by: SURGERY

## 2020-07-01 PROCEDURE — 96372 THER/PROPH/DIAG INJ SC/IM: CPT | Performed by: NURSE PRACTITIONER

## 2020-07-01 PROCEDURE — 84484 ASSAY OF TROPONIN QUANT: CPT | Performed by: EMERGENCY MEDICINE

## 2020-07-01 PROCEDURE — 80053 COMPREHEN METABOLIC PANEL: CPT | Performed by: EMERGENCY MEDICINE

## 2020-07-01 PROCEDURE — 85027 COMPLETE CBC AUTOMATED: CPT | Performed by: SURGERY

## 2020-07-01 PROCEDURE — 99207 ZZC NO CHARGE LOS: CPT

## 2020-07-01 PROCEDURE — 36415 COLL VENOUS BLD VENIPUNCTURE: CPT | Performed by: SURGERY

## 2020-07-01 PROCEDURE — 83735 ASSAY OF MAGNESIUM: CPT | Performed by: SURGERY

## 2020-07-01 PROCEDURE — 80197 ASSAY OF TACROLIMUS: CPT | Performed by: SURGERY

## 2020-07-01 PROCEDURE — 71046 X-RAY EXAM CHEST 2 VIEWS: CPT

## 2020-07-01 PROCEDURE — 80076 HEPATIC FUNCTION PANEL: CPT | Performed by: SURGERY

## 2020-07-01 PROCEDURE — 76705 ECHO EXAM OF ABDOMEN: CPT | Mod: 26 | Performed by: EMERGENCY MEDICINE

## 2020-07-01 PROCEDURE — 93005 ELECTROCARDIOGRAM TRACING: CPT | Performed by: EMERGENCY MEDICINE

## 2020-07-01 ASSESSMENT — MIFFLIN-ST. JEOR: SCORE: 1523.46
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
## 2020-07-01 NOTE — DISCHARGE INSTRUCTIONS
Please make an appointment to follow up with Your Primary Care Provider as soon as possible within the next week for a repeat hemoglobin check.     Return if dark stools, worsening chest pain, shortness of breath, dizziness
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
## 2020-07-01 NOTE — ED AVS SNAPSHOT
[facility], [place], Emergency Department  [address] [place] [id number]  Phone:  [phone]                                    Frandy Workman   MRN: [id number]    Department:  [facility], Emergency Department   Date of Visit:  7/1/2020           After Visit Summary Signature Page    I have received my discharge instructions, and my questions have been answered. I have discussed any challenges I see with this plan with the nurse or doctor.    ..........................................................................................................................................  Patient/Patient Representative Signature      ..........................................................................................................................................  Patient Representative Print Name and Relationship to Patient    ..................................................               ................................................  Date                                   Time    ..........................................................................................................................................  Reviewed by Signature/Title    ...................................................              ..............................................  Date                                               Time          22EPIC Rev 08/18
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
## 2020-07-01 NOTE — PROGRESS NOTES
"Infusion Nursing Note:  Frandy Workman presents today for Aranesp.    Patient seen by provider today: No   present during visit today: Not Applicable.    Note: Miller states the Klonopin 1 mg is too much and gives him a \"hangover\" the next day.  He is going to  call his transplant coordinator to discuss getting a 0.5 mg dose.      Intravenous Access:  No Intravenous access at this visit.    Treatment Conditions:  Lab Results   Component Value Date    HGB 9.0 07/01/2020     Lab Results   Component Value Date    WBC 3.8 07/01/2020      Lab Results   Component Value Date    ANEU 2.2 06/11/2020     Lab Results   Component Value Date     07/01/2020          Post Infusion Assessment:  Patient tolerated injection without incident.       Discharge Plan:     Patient discharged in stable condition accompanied by: self.  Departure Mode: Ambulatory.    Dalia Brewer RN                        "
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
## 2020-07-03 ENCOUNTER — DOCUMENTATION ONLY (OUTPATIENT)
Dept: CARE COORDINATION | Facility: CLINIC | Age: 55
End: 2020-07-03

## 2020-07-03 ENCOUNTER — TELEPHONE (OUTPATIENT)
Dept: INTERNAL MEDICINE | Facility: CLINIC | Age: 55
End: 2020-07-03

## 2020-07-03 NOTE — TELEPHONE ENCOUNTER
[facility] Call Center    Phone Message    May a detailed message be left on voicemail: yes     Reason for Call: Order(s): Home Care Orders: Skilled Nursing:  Private pay start of care orders for , current orders  on .     Action Taken: Message routed to:  [facility] pcc    Travel Screening: Not Applicable

## 2020-07-03 NOTE — TELEPHONE ENCOUNTER
I called to approve new start date as listed below.   Xiao Hernández, EMT at 3:28 PM on 7/3/2020. e

## 2020-07-03 NOTE — PROGRESS NOTES
[facility] now requests orders and shares plan of care/discharge summaries for some patients through [employer].  Please REPLY TO THIS MESSAGE OR ROUTE BACK TO THE AUTHOR in order to give authorization for orders when needed.  This is considered a verbal order, you will still receive a faxed copy of orders for signature.  Thank you for your assistance in improving collaboration for our patients.    ORDER    Private pay start of care orders for , current orders  on .     CHAVEZ Larson  [phone]

## 2020-07-07 ENCOUNTER — TELEPHONE (OUTPATIENT)
Dept: INTERNAL MEDICINE | Facility: CLINIC | Age: 55
End: 2020-07-07

## 2020-07-07 NOTE — TELEPHONE ENCOUNTER
Miller scheduled his Aranesp on 7/15/20 at 8:30a with labs prior.     Jie Blum RN   Anemia Services  [address] [address]   [email or web address]   Office : [phone]  Fax: [phone]

## 2020-07-08 DIAGNOSIS — E11.3293 TYPE 2 DIABETES MELLITUS WITH MILD NONPROLIFERATIVE RETINOPATHY OF BOTH EYES WITHOUT MACULAR EDEMA, UNSPECIFIED WHETHER LONG TERM INSULIN USE (H): Primary | ICD-10-CM

## 2020-07-08 RX ORDER — FLASH GLUCOSE SENSOR
KIT MISCELLANEOUS
Qty: 2 EACH | Refills: 11 | Status: SHIPPED | OUTPATIENT
Start: 2020-07-08 | End: 2020-07-09

## 2020-07-08 RX ORDER — FLASH GLUCOSE SCANNING READER
EACH MISCELLANEOUS
Qty: 1 EACH | Refills: 0 | Status: SHIPPED | OUTPATIENT
Start: 2020-07-08 | End: 2020-07-09

## 2020-07-08 NOTE — TELEPHONE ENCOUNTER
[facility] now requests orders and shares plan of care/discharge summaries for some patients through [employer].  Please REPLY TO THIS MESSAGE OR ROUTE BACK TO THE AUTHOR in order to give authorization for orders when needed.  This is considered a verbal order, you will still receive a faxed copy of orders for signature.  Thank you for your assistance in improving collaboration for our patients.    ORDER    Need extension please on SOC visit due to scheduling conflict with patient- 1 every 5 days skilled nurse for start of care for private pay medication set up and mgmt. Payer is patient    Tran Faye [phone]

## 2020-07-09 ENCOUNTER — MYC REFILL (OUTPATIENT)
Dept: ENDOCRINOLOGY | Facility: CLINIC | Age: 55
End: 2020-07-09

## 2020-07-09 ENCOUNTER — MYC REFILL (OUTPATIENT)
Dept: TRANSPLANT | Facility: CLINIC | Age: 55
End: 2020-07-09

## 2020-07-09 ENCOUNTER — MYC REFILL (OUTPATIENT)
Dept: INTERNAL MEDICINE | Facility: CLINIC | Age: 55
End: 2020-07-09

## 2020-07-09 ENCOUNTER — MYC REFILL (OUTPATIENT)
Dept: OTHER | Age: 55
End: 2020-07-09

## 2020-07-09 ENCOUNTER — OFFICE VISIT (OUTPATIENT)
Dept: ENDOCRINOLOGY | Facility: CLINIC | Age: 55
End: 2020-07-09
Payer: MEDICARE

## 2020-07-09 DIAGNOSIS — R60.0 PERIPHERAL EDEMA: ICD-10-CM

## 2020-07-09 DIAGNOSIS — L60.2 ONYCHAUXIS: ICD-10-CM

## 2020-07-09 DIAGNOSIS — Z94.4 LIVER TRANSPLANT RECIPIENT (H): ICD-10-CM

## 2020-07-09 DIAGNOSIS — E11.3293 TYPE 2 DIABETES MELLITUS WITH MILD NONPROLIFERATIVE RETINOPATHY OF BOTH EYES WITHOUT MACULAR EDEMA, UNSPECIFIED WHETHER LONG TERM INSULIN USE (H): ICD-10-CM

## 2020-07-09 DIAGNOSIS — E11.69 TYPE 2 DIABETES MELLITUS WITH OTHER SPECIFIED COMPLICATION, WITH LONG-TERM CURRENT USE OF INSULIN (H): ICD-10-CM

## 2020-07-09 DIAGNOSIS — R60.0 PERIPHERAL EDEMA: Primary | ICD-10-CM

## 2020-07-09 DIAGNOSIS — E11.49 TYPE II OR UNSPECIFIED TYPE DIABETES MELLITUS WITH NEUROLOGICAL MANIFESTATIONS, NOT STATED AS UNCONTROLLED(250.60) (H): ICD-10-CM

## 2020-07-09 DIAGNOSIS — Z79.4 TYPE 2 DIABETES MELLITUS WITHOUT COMPLICATION, WITH LONG-TERM CURRENT USE OF INSULIN (H): ICD-10-CM

## 2020-07-09 DIAGNOSIS — H04.123 DRY EYES: ICD-10-CM

## 2020-07-09 DIAGNOSIS — E11.9 TYPE 2 DIABETES MELLITUS WITHOUT COMPLICATION, WITH LONG-TERM CURRENT USE OF INSULIN (H): ICD-10-CM

## 2020-07-09 DIAGNOSIS — B35.3 TINEA PEDIS OF BOTH FEET: ICD-10-CM

## 2020-07-09 DIAGNOSIS — Z94.4 STATUS POST LIVER TRANSPLANTATION (H): ICD-10-CM

## 2020-07-09 DIAGNOSIS — B19.10 HEPATITIS B: ICD-10-CM

## 2020-07-09 DIAGNOSIS — E78.5 HYPERLIPIDEMIA, UNSPECIFIED HYPERLIPIDEMIA TYPE: ICD-10-CM

## 2020-07-09 DIAGNOSIS — N40.1 BENIGN PROSTATIC HYPERPLASIA WITH LOWER URINARY TRACT SYMPTOMS, SYMPTOM DETAILS UNSPECIFIED: ICD-10-CM

## 2020-07-09 DIAGNOSIS — Z79.4 TYPE 2 DIABETES MELLITUS WITH OTHER SPECIFIED COMPLICATION, WITH LONG-TERM CURRENT USE OF INSULIN (H): ICD-10-CM

## 2020-07-09 RX ORDER — CHOLECALCIFEROL (VITAMIN D3) 50 MCG
50 TABLET ORAL DAILY
Qty: 90 TABLET | Refills: 3 | Status: CANCELLED | OUTPATIENT
Start: 2020-07-09

## 2020-07-09 NOTE — LETTER
7/9/2020       RE: Frandy Workman  [address] [facility] [address]     Dear Colleague,    Thank you for referring your patient, Frandy Workman, to the [facility] ENDOCRINOLOGY at [facility]. Please see a copy of my visit note below.    Past Medical History:   Diagnosis Date     Anemia 2013     Arthritis      BPH (benign prostatic hyperplasia)      CAD (coronary artery disease) 4/1/2019     Cholelithiasis      Conductive hearing loss 08/16/2017     Depressive disorder 1986    Suffer effects throughout life     Gastroesophageal reflux disease 12/01/2014     HCC (hepatocellular carcinoma) (H) 1/22/2019     History of diabetic retinopathy 07/2018     HTN (hypertension)      HTN (hypertension) 11/20/2019     Hyperlipidemia      Liver cirrhosis secondary to ESTRADA (H)      Liver transplanted (H) 11/11/2019     Portal vein thrombosis 4/11/2019     Type II diabetes mellitus (H)      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, unspecified whether long term insulin use (H)     Status post coronary angiogram     CAD (coronary artery disease)     Liver transplant recipient (H)     Status post liver transplantation (H)     Immunosuppressed status (H)     HTN (hypertension)     Malnutrition related to chronic disease (H)     Hypophosphatasia     CKD (chronic kidney disease), stage III (H)     Malnutrition (H)     Anemia     Anxiety     Mild recurrent major depression (H)     Low hemoglobin     CKD (chronic kidney disease) stage 4, GFR 15-29 ml/min (H)     Anemia of chronic renal failure, stage 4 (severe)  (H)     Rekha (H)     Past Surgical History:   Procedure Laterality Date     COLONOSCOPY      2015     COLONOSCOPY N/A 12/6/2019    Procedure: COLONOSCOPY, WITH POLYPECTOMY AND BIOPSY;  Surgeon: Adam Morton MD;  Location:  GI     CV HEART CATHETERIZATION WITH POSSIBLE INTERVENTION N/A 2/26/2019    Procedure: CORS;  Surgeon: Jagdish Hoyt MD;  Location:  HEART CARDIAC CATH LAB     ESOPHAGOSCOPY, GASTROSCOPY, DUODENOSCOPY (EGD), COMBINED N/A 11/17/2016    Procedure: COMBINED ESOPHAGOSCOPY, GASTROSCOPY, DUODENOSCOPY (EGD);  Surgeon: Santi Rosas MD;  Location:  GI     ESOPHAGOSCOPY, GASTROSCOPY, DUODENOSCOPY (EGD), COMBINED N/A 11/17/2017    Procedure: COMBINED ESOPHAGOSCOPY, GASTROSCOPY, DUODENOSCOPY (EGD);  EGD;  Surgeon: Santi Rosas MD;  Location:  GI     ESOPHAGOSCOPY, GASTROSCOPY, DUODENOSCOPY (EGD), COMBINED N/A 12/28/2018    Procedure: EGD;  Surgeon: Santi Rosas MD;  [facility]     ESOPHAGOSCOPY, GASTROSCOPY, DUODENOSCOPY (EGD), COMBINED N/A 12/6/2019    Procedure: ESOPHAGOGASTRODUODENOSCOPY, WITH BIOPSY;  Surgeon: Adam Morton MD;  Location:  GI     ESOPHAGOSCOPY, GASTROSCOPY, DUODENOSCOPY (EGD), COMBINED N/A 2/13/2020    Procedure: ESOPHAGOGASTRODUODENOSCOPY (EGD);  Surgeon: Santi Rosas MD;  Location:  GI     HEAD & NECK SURGERY      12/2017 at [facility].      IMPLANT GOLD WEIGHT EYELID Right 11/16/2017    Procedure: IMPLANT WEIGHT EYELID;  Right Upper Eyelid Weight, right tarsal strip lower eyelid;  Surgeon: Milana Malave MD;  [facility]     IR CHEMO EMBOLIZATION  1/22/2019     KNEE SURGERY Left      ORTHOPEDIC SURGERY       PAROTIDECTOMY, RADICAL NECK DISSECTION Right 11/2/2017    Procedure: PAROTIDECTOMY, RADICAL NECK DISSECTION;  Right Superfacial Parotidectomy , Facial nerve repair. with [facility] facial nerve monitor.;  Surgeon: Asiya Morgan MD;  [facility] OR     PICC INSERTION Left 11/06/2017    [facility]  PICC, 44cm in the L basilic vein w/ tip in the low SVC     RETURN LIVER TRANSPLANT N/A 2019    Procedure: Exploratory laparotomy, hematoma evacuation, abdominal washout;  Surgeon: Александр Ramos MD;  Location: [facility] OR     TRANSPLANT LIVER RECIPIENT  DONOR N/A 2019    Procedure: TRANSPLANT, LIVER, RECIPIENT,  DONOR;  Surgeon: Александр Ramos MD;  Location: [facility] OR     VASCULAR SURGERY       Social History     Socioeconomic History     Marital status:      Spouse name: Not on file     Number of children: Not on file     Years of education: Not on file     Highest education level: Not on file   Occupational History     Not on file   Social Needs     Financial resource strain: Not on file     Food insecurity     Worry: Not on file     Inability: Not on file     Transportation needs     Medical: Not on file     Non-medical: Not on file   Tobacco Use     Smoking status: Former Smoker     Packs/day: 6.00     Years: 30.00     Pack years: 180.00     Types: Cigars     Start date: 2016     Last attempt to quit: 10/25/2017     Years since quittin.7     Smokeless tobacco: Former User     Types: Chew     Quit date: 10/31/2017     Tobacco comment: 1 tin per week   Substance and Sexual Activity     Alcohol use: No     Alcohol/week: 0.0 standard drinks     Comment: quit 1996     Drug use: No     Sexual activity: Not Currently     Partners: Female     Birth control/protection: Condom   Lifestyle     Physical activity     Days per week: Not on file     Minutes per session: Not on file     Stress: Not on file   Relationships     Social connections     Talks on phone: Not on file     Gets together: Not on file     Attends Anabaptist service: Not on file     Active member of club or organization: Not on file     Attends meetings of clubs or organizations: Not on file     Relationship status: Not on file     Intimate partner violence     Fear of current or ex partner: Not on file      Emotionally abused: Not on file     Physically abused: Not on file     Forced sexual activity: Not on file   Other Topics Concern     Parent/sibling w/ CABG, MI or angioplasty before 65F 55M? Yes   Social History Narrative     Not on file     Family History   Problem Relation Age of Onset     Prostate Cancer Maternal Grandfather      Substance Abuse Maternal Grandfather         Alcohol     Colon Cancer Father 60     Pancreatic Cancer Father 60     Prostate Cancer Father      Colorectal Cancer Father      Macular Degeneration Father      Cancer Father      Glaucoma Father      Skin Cancer Father      Colorectal Cancer Maternal Grandmother      Cancer Maternal Grandmother      Substance Abuse Maternal Grandmother         Alcohol     Colorectal Cancer Paternal Grandmother      Cancer Mother      Diabetes Mother          3/2016     Cerebrovascular Disease Mother         Passed away in Feb of this year, 80 years old.     Thyroid Disease Mother      Depression Mother      Asthma Sister         Had since birth     Thyroid Disease Sister      Depression Sister      Liver Disease No family hx of      Melanoma No family hx of      Lab Results   Component Value Date    A1C 6.3 2020    A1C 6.6 2018    A1C 6.5 2017    A1C 7.8 10/25/2016     Lab Results   Component Value Date    WBC 3.4 2020     Lab Results   Component Value Date    RBC 2.37 2020     Lab Results   Component Value Date    HGB 7.9 2020     Lab Results   Component Value Date    HCT 25.1 2020     No components found for: MCT  Lab Results   Component Value Date     2020     Lab Results   Component Value Date    MCH 33.3 2020     Lab Results   Component Value Date    MCHC 31.5 2020     Lab Results   Component Value Date    RDW 16.2 2020     Lab Results   Component Value Date    PLT 88 2020     Last Comprehensive Metabolic Panel:  Sodium   Date Value Ref Range Status   2020 142  133 - 144 mmol/L Final     Potassium   Date Value Ref Range Status   07/01/2020 5.1 3.4 - 5.3 mmol/L Final     Chloride   Date Value Ref Range Status   07/01/2020 114 (H) 94 - 109 mmol/L Final     Carbon Dioxide   Date Value Ref Range Status   07/01/2020 26 20 - 32 mmol/L Final     Anion Gap   Date Value Ref Range Status   07/01/2020 2 (L) 3 - 14 mmol/L Final     Glucose   Date Value Ref Range Status   07/01/2020 248 (H) 70 - 99 mg/dL Final     Urea Nitrogen   Date Value Ref Range Status   07/01/2020 37 (H) 7 - 30 mg/dL Final     Creatinine   Date Value Ref Range Status   07/01/2020 1.48 (H) 0.66 - 1.25 mg/dL Final     GFR Estimate   Date Value Ref Range Status   07/01/2020 52 (L) >60 mL/min/[1.73_m2] Final     Comment:     Non  GFR Calc  Starting 12/18/2018, serum creatinine based estimated GFR (eGFR) will be   calculated using the Chronic Kidney Disease Epidemiology Collaboration   (CKD-EPI) equation.       Calcium   Date Value Ref Range Status   07/01/2020 8.5 8.5 - 10.1 mg/dL Final     Lab Results   Component Value Date    AST 14 07/01/2020     Lab Results   Component Value Date    ALT 31 07/01/2020     No results found for: BILICONJ   Lab Results   Component Value Date    BILITOTAL 0.3 07/01/2020     Lab Results   Component Value Date    ALBUMIN 3.4 07/01/2020     Lab Results   Component Value Date    PROTTOTAL 6.4 07/01/2020      Lab Results   Component Value Date    ALKPHOS 110 07/01/2020       SUBJECTIVE FINDINGS:  A 65-year-old male returns to clinic for diabetic foot cares.  He relates he is doing well.  No ulcers or sores since we have seen him last.  He relates he does have neuropathy, numbness, tingling.  He relates he is wearing Nike shoes, but he is going to get some new diabetic shoes as well.  He relates that he needs his toenails cut.  He has got some right hallux bruising.  He relates he has been getting some edema.  He talked to his primary physician.  They will not put him on any  Lasix.  He relates he has been walking about 10 miles a day, and he is doing swimming as well.  He says he is doing well.      OBJECTIVE FINDINGS:  DP and PT are 2/4 bilaterally.  He has decreased hair growth bilaterally.  He has some peripheral edema bilaterally with some varicosities.  He has incurvated nails with some right hallux nail subungual dried ecchymosis.  This is growing out.  There is no erythema, no drainage, no odor, no calor bilaterally.  No gross tendon voids bilaterally.      ASSESSMENT AND PLAN:  Onychauxis bilaterally.  He has some subungual bruising on the right hallux nail.  That is growing out.  He has got peripheral edema.  He is diabetic with peripheral neuropathy and vascular disease.  Diagnosis and treatment options discussed with the patient.   All the nails were reduced bilaterally upon consent.  Prescription for compression socks 15-20 mmHg given and use discussed with him.  He will return to clinic and see me in about 3 months.         Again, thank you for allowing me to participate in the care of your patient.      Sincerely,    Lamin Gonzalez DPM

## 2020-07-09 NOTE — PROGRESS NOTES
Past Medical History:   Diagnosis Date     Anemia 2013     Arthritis      BPH (benign prostatic hyperplasia)      CAD (coronary artery disease) 4/1/2019     Cholelithiasis      Conductive hearing loss 08/16/2017     Depressive disorder 1986    Suffer effects throughout life     Gastroesophageal reflux disease 12/01/2014     HCC (hepatocellular carcinoma) (H) 1/22/2019     History of diabetic retinopathy 07/2018     HTN (hypertension)      HTN (hypertension) 11/20/2019     Hyperlipidemia      Liver cirrhosis secondary to ESTRADA (H)      Liver transplanted (H) 11/11/2019     Portal vein thrombosis 4/11/2019     Type II diabetes mellitus (H)      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, unspecified whether long term insulin use (H)     Status post coronary angiogram     CAD (coronary artery disease)     Liver transplant recipient (H)     Status post liver transplantation (H)     Immunosuppressed status (H)     HTN (hypertension)     Malnutrition related to chronic disease (H)     Hypophosphatasia     CKD (chronic kidney disease), stage III (H)     Malnutrition (H)     Anemia     Anxiety     Mild recurrent major depression (H)     Low hemoglobin     CKD (chronic kidney disease) stage 4, GFR 15-29 ml/min (H)     Anemia of chronic renal failure, stage 4 (severe) (H)     Rekha (H)     Past Surgical History:   Procedure Laterality Date     COLONOSCOPY      2015     COLONOSCOPY N/A 12/6/2019    Procedure: COLONOSCOPY, WITH POLYPECTOMY AND BIOPSY;  Surgeon: Adam Morton MD;  Location:  GI     CV HEART CATHETERIZATION WITH POSSIBLE  INTERVENTION N/A 2019    Procedure: CORS;  Surgeon: Jagdish Hoyt MD;  Location:  HEART CARDIAC CATH LAB     ESOPHAGOSCOPY, GASTROSCOPY, DUODENOSCOPY (EGD), COMBINED N/A 2016    Procedure: COMBINED ESOPHAGOSCOPY, GASTROSCOPY, DUODENOSCOPY (EGD);  Surgeon: Santi Rosas MD;  Location:  GI     ESOPHAGOSCOPY, GASTROSCOPY, DUODENOSCOPY (EGD), COMBINED N/A 2017    Procedure: COMBINED ESOPHAGOSCOPY, GASTROSCOPY, DUODENOSCOPY (EGD);  EGD;  Surgeon: Santi Rossa MD;  Location:  GI     ESOPHAGOSCOPY, GASTROSCOPY, DUODENOSCOPY (EGD), COMBINED N/A 2018    Procedure: EGD;  Surgeon: Santi Rosas MD;  Location:  OR     ESOPHAGOSCOPY, GASTROSCOPY, DUODENOSCOPY (EGD), COMBINED N/A 2019    Procedure: ESOPHAGOGASTRODUODENOSCOPY, WITH BIOPSY;  Surgeon: Adam Morton MD;  Location:  GI     ESOPHAGOSCOPY, GASTROSCOPY, DUODENOSCOPY (EGD), COMBINED N/A 2020    Procedure: ESOPHAGOGASTRODUODENOSCOPY (EGD);  Surgeon: Santi Rosas MD;  Location:  GI     HEAD & NECK SURGERY      2017 at [facility].      IMPLANT GOLD WEIGHT EYELID Right 2017    Procedure: IMPLANT WEIGHT EYELID;  Right Upper Eyelid Weight, right tarsal strip lower eyelid;  Surgeon: Milana Malave MD;  [facility]     IR CHEMO EMBOLIZATION  2019     KNEE SURGERY Left      ORTHOPEDIC SURGERY       PAROTIDECTOMY, RADICAL NECK DISSECTION Right 2017    Procedure: PAROTIDECTOMY, RADICAL NECK DISSECTION;  Right Superfacial Parotidectomy , Facial nerve repair. with [facility] facial nerve monitor.;  Surgeon: Asiya Morgan MD;  Location: [facility] OR     PICC INSERTION Left 2017    4fr [employer] PICC, 44cm in the L basilic vein w/ tip in the low SVC     RETURN LIVER TRANSPLANT N/A 2019    Procedure: Exploratory laparotomy, hematoma evacuation, abdominal washout;  Surgeon: Александр Ramos MD;  [facility]: [facility] OR     TRANSPLANT LIVER RECIPIENT  DONOR N/A  2019    Procedure: TRANSPLANT, LIVER, RECIPIENT,  DONOR;  Surgeon: Александр Ramos MD;  Location: [facility] OR     VASCULAR SURGERY       Social History     Socioeconomic History     Marital status:      Spouse name: Not on file     Number of children: Not on file     Years of education: Not on file     Highest education level: Not on file   Occupational History     Not on file   Social Needs     Financial resource strain: Not on file     Food insecurity     Worry: Not on file     Inability: Not on file     Transportation needs     Medical: Not on file     Non-medical: Not on file   Tobacco Use     Smoking status: Former Smoker     Packs/day: 6.00     Years: 30.00     Pack years: 180.00     Types: Cigars     Start date: 2016     Last attempt to quit: 10/25/2017     Years since quittin.7     Smokeless tobacco: Former User     Types: Chew     Quit date: 10/31/2017     Tobacco comment: 1 tin per week   Substance and Sexual Activity     Alcohol use: No     Alcohol/week: 0.0 standard drinks     Comment: quit 1996     Drug use: No     Sexual activity: Not Currently     Partners: Female     Birth control/protection: Condom   Lifestyle     Physical activity     Days per week: Not on file     Minutes per session: Not on file     Stress: Not on file   Relationships     Social connections     Talks on phone: Not on file     Gets together: Not on file     Attends Caodaism service: Not on file     Active member of club or organization: Not on file     Attends meetings of clubs or organizations: Not on file     Relationship status: Not on file     Intimate partner violence     Fear of current or ex partner: Not on file     Emotionally abused: Not on file     Physically abused: Not on file     Forced sexual activity: Not on file   Other Topics Concern     Parent/sibling w/ CABG, MI or angioplasty before 65F 55M? Yes   Social History Narrative     Not on file     Family History   Problem Relation Age  of Onset     Prostate Cancer Maternal Grandfather      Substance Abuse Maternal Grandfather         Alcohol     Colon Cancer Father 60     Pancreatic Cancer Father 60     Prostate Cancer Father      Colorectal Cancer Father      Macular Degeneration Father      Cancer Father      Glaucoma Father      Skin Cancer Father      Colorectal Cancer Maternal Grandmother      Cancer Maternal Grandmother      Substance Abuse Maternal Grandmother         Alcohol     Colorectal Cancer Paternal Grandmother      Cancer Mother      Diabetes Mother          3/2016     Cerebrovascular Disease Mother         Passed away in Feb of this year, 80 years old.     Thyroid Disease Mother      Depression Mother      Asthma Sister         Had since birth     Thyroid Disease Sister      Depression Sister      Liver Disease No family hx of      Melanoma No family hx of      Lab Results   Component Value Date    A1C 6.3 2020    A1C 6.6 2018    A1C 6.5 2017    A1C 7.8 10/25/2016     Lab Results   Component Value Date    WBC 3.4 2020     Lab Results   Component Value Date    RBC 2.37 2020     Lab Results   Component Value Date    HGB 7.9 2020     Lab Results   Component Value Date    HCT 25.1 2020     No components found for: MCT  Lab Results   Component Value Date     2020     Lab Results   Component Value Date    MCH 33.3 2020     Lab Results   Component Value Date    MCHC 31.5 2020     Lab Results   Component Value Date    RDW 16.2 2020     Lab Results   Component Value Date    PLT 88 2020     Last Comprehensive Metabolic Panel:  Sodium   Date Value Ref Range Status   2020 142 133 - 144 mmol/L Final     Potassium   Date Value Ref Range Status   2020 5.1 3.4 - 5.3 mmol/L Final     Chloride   Date Value Ref Range Status   2020 114 (H) 94 - 109 mmol/L Final     Carbon Dioxide   Date Value Ref Range Status   2020 26 20 - 32 mmol/L Final      Anion Gap   Date Value Ref Range Status   07/01/2020 2 (L) 3 - 14 mmol/L Final     Glucose   Date Value Ref Range Status   07/01/2020 248 (H) 70 - 99 mg/dL Final     Urea Nitrogen   Date Value Ref Range Status   07/01/2020 37 (H) 7 - 30 mg/dL Final     Creatinine   Date Value Ref Range Status   07/01/2020 1.48 (H) 0.66 - 1.25 mg/dL Final     GFR Estimate   Date Value Ref Range Status   07/01/2020 52 (L) >60 mL/min/[1.73_m2] Final     Comment:     Non  GFR Calc  Starting 12/18/2018, serum creatinine based estimated GFR (eGFR) will be   calculated using the Chronic Kidney Disease Epidemiology Collaboration   (CKD-EPI) equation.       Calcium   Date Value Ref Range Status   07/01/2020 8.5 8.5 - 10.1 mg/dL Final     Lab Results   Component Value Date    AST 14 07/01/2020     Lab Results   Component Value Date    ALT 31 07/01/2020     No results found for: BILICONJ   Lab Results   Component Value Date    BILITOTAL 0.3 07/01/2020     Lab Results   Component Value Date    ALBUMIN 3.4 07/01/2020     Lab Results   Component Value Date    PROTTOTAL 6.4 07/01/2020      Lab Results   Component Value Date    ALKPHOS 110 07/01/2020       SUBJECTIVE FINDINGS:  A 65-year-old male returns to clinic for diabetic foot cares.  He relates he is doing well.  No ulcers or sores since we have seen him last.  He relates he does have neuropathy, numbness, tingling.  He relates he is wearing Nike shoes, but he is going to get some new diabetic shoes as well.  He relates that he needs his toenails cut.  He has got some right hallux bruising.  He relates he has been getting some edema.  He talked to his primary physician.  They will not put him on any Lasix.  He relates he has been walking about 10 miles a day, and he is doing swimming as well.  He says he is doing well.      OBJECTIVE FINDINGS:  DP and PT are 2/4 bilaterally.  He has decreased hair growth bilaterally.  He has some peripheral edema bilaterally with some  varicosities.  He has incurvated nails with some right hallux nail subungual dried ecchymosis.  This is growing out.  There is no erythema, no drainage, no odor, no calor bilaterally.  No gross tendon voids bilaterally.      ASSESSMENT AND PLAN:  Onychauxis bilaterally.  He has some subungual bruising on the right hallux nail.  That is growing out.  He has got peripheral edema.  He is diabetic with peripheral neuropathy and vascular disease.  Diagnosis and treatment options discussed with the patient.   All the nails were reduced bilaterally upon consent.  Prescription for compression socks 15-20 mmHg given and use discussed with him.  He will return to clinic and see me in about 3 months.

## 2020-07-10 DIAGNOSIS — E11.3293 TYPE 2 DIABETES MELLITUS WITH MILD NONPROLIFERATIVE RETINOPATHY OF BOTH EYES WITHOUT MACULAR EDEMA, UNSPECIFIED WHETHER LONG TERM INSULIN USE (H): Primary | ICD-10-CM

## 2020-07-10 RX ORDER — FLASH GLUCOSE SCANNING READER
EACH MISCELLANEOUS
Qty: 1 EACH | Refills: 0 | Status: SHIPPED | OUTPATIENT
Start: 2020-07-10 | End: 2023-03-20

## 2020-07-10 RX ORDER — FLASH GLUCOSE SCANNING READER
EACH MISCELLANEOUS
Qty: 1 EACH | Refills: 0 | Status: SHIPPED | OUTPATIENT
Start: 2020-07-10 | End: 2020-07-18

## 2020-07-10 RX ORDER — PEN NEEDLE, DIABETIC 32GX 5/32"
NEEDLE, DISPOSABLE MISCELLANEOUS
Qty: 300 EACH | Refills: 3 | Status: SHIPPED | OUTPATIENT
Start: 2020-07-10 | End: 2020-07-18

## 2020-07-10 RX ORDER — FLASH GLUCOSE SENSOR
KIT MISCELLANEOUS
Qty: 2 EACH | Refills: 11 | Status: SHIPPED | OUTPATIENT
Start: 2020-07-10 | End: 2020-09-23

## 2020-07-10 RX ORDER — FLASH GLUCOSE SENSOR
KIT MISCELLANEOUS
Qty: 2 EACH | Refills: 11 | Status: SHIPPED | OUTPATIENT
Start: 2020-07-10 | End: 2020-07-16

## 2020-07-10 RX ORDER — LAMIVUDINE 100 MG/1
100 TABLET, FILM COATED ORAL DAILY
Qty: 30 TABLET | Refills: 3 | Status: SHIPPED | OUTPATIENT
Start: 2020-07-10 | End: 2020-11-23

## 2020-07-10 RX ORDER — ECONAZOLE NITRATE 10 MG/G
CREAM TOPICAL DAILY
Qty: 85 G | Refills: 0 | Status: SHIPPED | OUTPATIENT
Start: 2020-07-10 | End: 2020-07-20

## 2020-07-11 ENCOUNTER — MEDICAL CORRESPONDENCE (OUTPATIENT)
Dept: HEALTH INFORMATION MANAGEMENT | Facility: CLINIC | Age: 55
End: 2020-07-11

## 2020-07-13 ENCOUNTER — TELEPHONE (OUTPATIENT)
Dept: PSYCHIATRY | Facility: CLINIC | Age: 55
End: 2020-07-13

## 2020-07-13 DIAGNOSIS — Z79.4 TYPE 2 DIABETES MELLITUS WITH STAGE 3 CHRONIC KIDNEY DISEASE, WITH LONG-TERM CURRENT USE OF INSULIN (H): ICD-10-CM

## 2020-07-13 DIAGNOSIS — N18.30 TYPE 2 DIABETES MELLITUS WITH STAGE 3 CHRONIC KIDNEY DISEASE, WITH LONG-TERM CURRENT USE OF INSULIN (H): ICD-10-CM

## 2020-07-13 DIAGNOSIS — E11.22 TYPE 2 DIABETES MELLITUS WITH STAGE 3 CHRONIC KIDNEY DISEASE, WITH LONG-TERM CURRENT USE OF INSULIN (H): ICD-10-CM

## 2020-07-13 DIAGNOSIS — F30.9 MANIA (H): ICD-10-CM

## 2020-07-13 DIAGNOSIS — Z94.4 LIVER TRANSPLANT RECIPIENT (H): ICD-10-CM

## 2020-07-13 NOTE — TELEPHONE ENCOUNTER
Pt requested Zolpidem rx  Thank you!  Desirae Rodriguez CPhT  [facility]

## 2020-07-13 NOTE — TELEPHONE ENCOUNTER
"rosuvastatin (CRESTOR) 5 MG tablet       Last Written Prescription Date:  12/17/19 by Dr Bebeto Park  Last Fill Quantity: 30,   # refills: 3  Last Office Visit : 6/2/20 with Dr Tucker with recommended follow up with Dr Moe in 1 month  Future Office visit:  None scheduled    Routing refill request to provider for review/approval because:  Overdue for LDL, not prescribed by Dr Tucker,  cannot get additional refills from previous provider        tamsulosin (FLOMAX) 0.4 MG capsule       Last Written Prescription Date:  12/17/19 by Dr Bebeto Park, and shows \"prohibited\" behind his name  Last Fill Yfpr0osrs: 90,   # refills: 3  Last Office Visit : 6/2/20 with Dr Tucker with recommended follow up with Dr Moe in 1 month  Future Office visit:  None scheduled    Routing refill request to provider for review/approval because:  Not prescribed by Dr Tucker, cannot get additional refills from previous provider      Artificial Tear Solution (SM ARTIFICIAL TEARS) SOLN    Last Written Prescription Date:  12/18/19 by Dr Park  Last Fill Quantity: 1 bottle,   # refills: 3  Last Office Visit : 6/2/20 with Dr Tucker with recommended follow up with Dr Moe in 1 month  Future Office visit:  None scheduled    Routing refill request to provider for review/approval because:  Not prescribed by Dr Tucker, cannot get additional refills from previous provider       glucose (BD GLUCOSE) 4 g chewable tablet       Last Written Prescription Date:  12/18/19  Last Fill Quantity: 60,   # refills: 1  Last Office Visit : 6/2/20 with Dr Tucker with recommended follow up with Dr Moe in 1 month  Future Office visit:  None scheduled    Routing refill request to provider for review/approval because:  Not prescribed by Dr Tucker, cannot get additional refills from previous provider       Acetaminophen (TYLENOL) 325 MG CAPS       Last Written Prescription Date:  12/18/19  Last Fill Quantity: 100,   # refills: 3  Last Office " Visit : 6/2/20 with Dr Tucker with recommended follow up with Dr Moe in 1 month  Future Office visit:  None scheduled    Routing refill request to provider for review/approval because:  Not prescribed by Dr Tucker, cannot get additional refills from previous provider

## 2020-07-13 NOTE — TELEPHONE ENCOUNTER
insulin degludec (TRESIBA FLEXTOUCH) 100 UNIT/ML pen  Last Written Prescription Date:  6/25/20  Last Fill Quantity: 15ml,   # refills: 0  Last Office Visit :  5/7/20  Future Office visit:  None     insulin aspart (NOVOLOG PEN) 100 UNIT/ML pen   Last Written Prescription Date:  6/25/20  Last Fill Quantity: 20ml,   # refills: 0        Routing refill request to provider for review/approval because: endo triage to fill. previously from other provider. novolog dx?

## 2020-07-14 RX ORDER — TAMSULOSIN HYDROCHLORIDE 0.4 MG/1
CAPSULE ORAL
Qty: 90 CAPSULE | Refills: 1 | Status: SHIPPED | OUTPATIENT
Start: 2020-07-14 | End: 2020-07-18

## 2020-07-14 RX ORDER — ROSUVASTATIN CALCIUM 5 MG/1
5 TABLET, COATED ORAL DAILY
Qty: 30 TABLET | Refills: 1 | Status: SHIPPED | OUTPATIENT
Start: 2020-07-14 | End: 2020-07-18

## 2020-07-14 RX ORDER — POLYVINYL ALCOHOL/POVIDONE 0.5%-0.6%
1 DROPS OPHTHALMIC (EYE)
Qty: 1 BOTTLE | Refills: 1 | Status: SHIPPED | OUTPATIENT
Start: 2020-07-14 | End: 2020-07-18

## 2020-07-15 ENCOUNTER — INFUSION THERAPY VISIT (OUTPATIENT)
Dept: INFUSION THERAPY | Facility: CLINIC | Age: 55
End: 2020-07-15
Payer: MEDICARE

## 2020-07-15 ENCOUNTER — TELEPHONE (OUTPATIENT)
Dept: PSYCHIATRY | Facility: CLINIC | Age: 55
End: 2020-07-15

## 2020-07-15 ENCOUNTER — TELEPHONE (OUTPATIENT)
Dept: PHARMACY | Facility: CLINIC | Age: 55
End: 2020-07-15

## 2020-07-15 VITALS
DIASTOLIC BLOOD PRESSURE: 77 MMHG | SYSTOLIC BLOOD PRESSURE: 150 MMHG | BODY MASS INDEX: 23.89 KG/M2 | OXYGEN SATURATION: 100 % | HEART RATE: 92 BPM | WEIGHT: 161.8 LBS

## 2020-07-15 DIAGNOSIS — Z94.4 LIVER REPLACED BY TRANSPLANT (H): ICD-10-CM

## 2020-07-15 DIAGNOSIS — N18.4 ANEMIA OF CHRONIC RENAL FAILURE, STAGE 4 (SEVERE) (H): Primary | ICD-10-CM

## 2020-07-15 DIAGNOSIS — Z13.220 LIPID SCREENING: ICD-10-CM

## 2020-07-15 DIAGNOSIS — D63.1 ANEMIA OF CHRONIC RENAL FAILURE, STAGE 4 (SEVERE) (H): Primary | ICD-10-CM

## 2020-07-15 LAB
ALBUMIN SERPL-MCNC: 3.6 G/DL (ref 3.4–5)
ALP SERPL-CCNC: 145 U/L (ref 40–150)
ALT SERPL W P-5'-P-CCNC: 34 U/L (ref 0–70)
ANION GAP SERPL CALCULATED.3IONS-SCNC: 4 MMOL/L (ref 3–14)
AST SERPL W P-5'-P-CCNC: 17 U/L (ref 0–45)
BILIRUB DIRECT SERPL-MCNC: <0.1 MG/DL (ref 0–0.2)
BILIRUB SERPL-MCNC: 0.3 MG/DL (ref 0.2–1.3)
BUN SERPL-MCNC: 43 MG/DL (ref 7–30)
CALCIUM SERPL-MCNC: 8.8 MG/DL (ref 8.5–10.1)
CHLORIDE SERPL-SCNC: 113 MMOL/L (ref 94–109)
CO2 SERPL-SCNC: 25 MMOL/L (ref 20–32)
CREAT SERPL-MCNC: 1.33 MG/DL (ref 0.66–1.25)
ERYTHROCYTE [DISTWIDTH] IN BLOOD BY AUTOMATED COUNT: 14.5 % (ref 10–15)
GFR SERPL CREATININE-BSD FRML MDRD: 59 ML/MIN/{1.73_M2}
GLUCOSE SERPL-MCNC: 132 MG/DL (ref 70–99)
HCT VFR BLD AUTO: 30 % (ref 40–53)
HGB BLD-MCNC: 9.9 G/DL (ref 13.3–17.7)
MAGNESIUM SERPL-MCNC: 1.5 MG/DL (ref 1.6–2.3)
MCH RBC QN AUTO: 33.1 PG (ref 26.5–33)
MCHC RBC AUTO-ENTMCNC: 33 G/DL (ref 31.5–36.5)
MCV RBC AUTO: 100 FL (ref 78–100)
PHOSPHATE SERPL-MCNC: 2.6 MG/DL (ref 2.5–4.5)
PLATELET # BLD AUTO: 116 10E9/L (ref 150–450)
POTASSIUM SERPL-SCNC: 4.4 MMOL/L (ref 3.4–5.3)
PROT SERPL-MCNC: 7.3 G/DL (ref 6.8–8.8)
RBC # BLD AUTO: 2.99 10E12/L (ref 4.4–5.9)
SODIUM SERPL-SCNC: 142 MMOL/L (ref 133–144)
TACROLIMUS BLD-MCNC: 6.4 UG/L (ref 5–15)
TME LAST DOSE: NORMAL H
WBC # BLD AUTO: 4 10E9/L (ref 4–11)

## 2020-07-15 PROCEDURE — 85027 COMPLETE CBC AUTOMATED: CPT | Performed by: SURGERY

## 2020-07-15 PROCEDURE — 83735 ASSAY OF MAGNESIUM: CPT | Performed by: SURGERY

## 2020-07-15 PROCEDURE — 84100 ASSAY OF PHOSPHORUS: CPT | Performed by: SURGERY

## 2020-07-15 PROCEDURE — 36415 COLL VENOUS BLD VENIPUNCTURE: CPT | Performed by: SURGERY

## 2020-07-15 PROCEDURE — 80197 ASSAY OF TACROLIMUS: CPT | Performed by: SURGERY

## 2020-07-15 PROCEDURE — 80048 BASIC METABOLIC PNL TOTAL CA: CPT | Performed by: SURGERY

## 2020-07-15 PROCEDURE — 80076 HEPATIC FUNCTION PANEL: CPT | Performed by: SURGERY

## 2020-07-15 PROCEDURE — 99207 ZZC NO CHARGE NURSE ONLY: CPT

## 2020-07-15 RX ORDER — OLANZAPINE 2.5 MG/1
TABLET, FILM COATED ORAL
Qty: 30 TABLET | Refills: 0 | OUTPATIENT
Start: 2020-07-15

## 2020-07-15 RX ORDER — PANTOPRAZOLE SODIUM 40 MG/1
TABLET, DELAYED RELEASE ORAL
Qty: 30 TABLET | Refills: 0 | OUTPATIENT
Start: 2020-07-15

## 2020-07-15 NOTE — TELEPHONE ENCOUNTER
Health Call Center    Phone Message    May a detailed message be left on voicemail: yes     Reason for Call: Medication Refill Request    Has the patient contacted the pharmacy for the refill? Yes   Name of medication being requested: Zolpidem  Provider who prescribed the medication: Bre  Pharmacy: [facility] on [address]   Date medication is needed: Now   Pt and home care RN called to find out where this med went-he wants it filled at [facility] on [address]. Please call the pt with any questions.       Action Taken: Message routed to:  [facility]  psychiatry    Travel Screening: Not Applicable

## 2020-07-15 NOTE — TELEPHONE ENCOUNTER
I have never worked with this patient. I did provide general advice to some members of his care team in the period leading up to his hospitalization, but have never performed any official services or directly contributed to clinical decision making for the patient. I have also never prescribed any medications for him.     He is currently primarily managed by primary care. He was supposed to see Dr. Maldonado at [facility] on 6/29.     I would advise the following:   Request that PCP nursing staff call patient and verify whether he kept his appointment with Dr. Maldonado.   If he did, would advise him to contact Dr. Maldonado.   If he did not, would advise him to reschedule with Dr. Maldonado as soon as possible, AND PCP could talk to him about whether it might be appropriate to decrease olanzapine to 12.5mg, or 10mg at the lowest, if his  nursing staff feels that this is appropriate. If his behaviors have not been stable, would recommend staying at 15mg until he can be seen by Dr. Maldonado.

## 2020-07-15 NOTE — TELEPHONE ENCOUNTER
Refills denied   Not seen here only in ED/hospital  followed by Dr. Maldonado at [facility]

## 2020-07-15 NOTE — TELEPHONE ENCOUNTER
[facility] Call Center    Phone Message    May a detailed message be left on voicemail: yes     Reason for Call: Other: Pt called with  home nurse:  They stated that there was a concern with the pt;s olanzapine 15mg being too strong and that he was working with  for the medication so they were hoping to speak with him or his nurse. Pt stated he had a phone call with . The writer couldn't find any documentation of the conversation. They report that the medication is too strong and causing side effects.     Action Taken:  and Sharona Wilkins    Travel Screening: Not Applicable
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
## 2020-07-15 NOTE — TELEPHONE ENCOUNTER
Anemia Management Note  SUBJECTIVE/OBJECTIVE:  Referred by Dr. Eloy Rao on 3/2/2020  Primary Diagnosis: Anemia in Chronic Kidney Disease (N18.4, D63.1)     Secondary Diagnosis:  Chronic Kidney Disease, Stage 4 (N18.4)                 Liver Transplant: 2019  Hgb goal range:  10-11.5 per Dr. Rao's referral  Epo/Darbo: Aranesp 40mcg every 14 days for Hgb <11.5.  In Clinic.  Iron regimen:  Ferrous Sulfate 325mg once daily - was taking three times daily, decreased in   Labs : Hemoglobin - has standing order for CBC/platelets twice weekly - expires 2020  Iron Labs: 3/3/2021  Recent IVELISSE use, transfusion, IV iron: PRBC on 3/3  RX/TX plans : TBD  No history of stroke, MI and blood clots.  History of hepatocellular carcinoma - s/p TACE 2019 and liver transplant 2019     Contact:            Ok to leave message regarding scheduling, medical, and billing per consent to communicate dated 10/2/19                              OK to speak with Davi Saunders (friend/POA) regarding scheduling, medical, and billing per consent to communicate dated 10/2/19    Anemia Latest Ref Rng & Units 2020 2020 2020 2020 2020 2020 7/15/2020   IVELISSE Dose - - - - - 40mcg - -   Hemoglobin 13.3 - 17.7 g/dL 9.2(L) 9.3(L) 8.7(L) 9.0(L) - 7.9(L) 9.9(L)   TSAT 15 - 46 % - - 29 - - - -   Ferritin 26 - 388 ng/mL - - 352 - - - -   PRBCs - - - - - - - -     BP Readings from Last 3 Encounters:   07/15/20 (!) 150/77   20 136/72   20 101/44     Wt Readings from Last 2 Encounters:   07/15/20 73.4 kg (161 lb 12.8 oz)   20 70.3 kg (155 lb)           ASSESSMENT:  Hgb:At goal but rapid rise in hgb (>1pt/2 weeks) - recommend hold dose  TSat: not at goal of >30% Ferritin: At goal (>100ng/mL)    PLAN:  Hold Aranesp and RTC for hgb then aranesp if needed in 2 week(s)    Orders needed to be renewed (for next follow-up date) in EPIC: None    Iron labs due:  2020    Plan discussed  with:  Ekaterina at [facility].   Plan provided by:  adri    NEXT FOLLOW-UP DATE:  7/27/20  Schedule next aranesp with Miller.     Jie Blum RN   Anemia Services  [address] [address]   [email or web address]   Office : [phone]  Fax: [phone]

## 2020-07-16 ENCOUNTER — TELEPHONE (OUTPATIENT)
Dept: TRANSPLANT | Facility: CLINIC | Age: 55
End: 2020-07-16

## 2020-07-16 DIAGNOSIS — E11.3293 TYPE 2 DIABETES MELLITUS WITH MILD NONPROLIFERATIVE RETINOPATHY OF BOTH EYES WITHOUT MACULAR EDEMA, UNSPECIFIED WHETHER LONG TERM INSULIN USE (H): ICD-10-CM

## 2020-07-16 RX ORDER — FLASH GLUCOSE SENSOR
KIT MISCELLANEOUS
Qty: 2 EACH | Refills: 11 | Status: SHIPPED | OUTPATIENT
Start: 2020-07-16 | End: 2020-07-18

## 2020-07-16 NOTE — TELEPHONE ENCOUNTER
I sent Miller a mychart (he responds quickly to mycharts), he said that he did see a psychiatrist with whom he had discussed the medication with previously, likely Dr. Maldonado. I gave him the contact information to the [facility] and instructed the patient to call and get their next available appointment scheduled.   Xiao Hernández EMT at 11:41 AM on 7/16/2020.
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
## 2020-07-16 NOTE — TELEPHONE ENCOUNTER
Per Medicare, need new Rx for Freestyle Benjamin sensors from [facility]  Thank you!  Desirae Rodriguez CPhT  [facility]

## 2020-07-18 ENCOUNTER — MYC REFILL (OUTPATIENT)
Dept: ENDOCRINOLOGY | Facility: CLINIC | Age: 55
End: 2020-07-18

## 2020-07-18 ENCOUNTER — MYC REFILL (OUTPATIENT)
Dept: INTERNAL MEDICINE | Facility: CLINIC | Age: 55
End: 2020-07-18

## 2020-07-18 DIAGNOSIS — E78.5 HYPERLIPIDEMIA, UNSPECIFIED HYPERLIPIDEMIA TYPE: ICD-10-CM

## 2020-07-18 DIAGNOSIS — Z79.4 TYPE 2 DIABETES MELLITUS WITHOUT COMPLICATION, WITH LONG-TERM CURRENT USE OF INSULIN (H): ICD-10-CM

## 2020-07-18 DIAGNOSIS — E11.3293 TYPE 2 DIABETES MELLITUS WITH MILD NONPROLIFERATIVE RETINOPATHY OF BOTH EYES WITHOUT MACULAR EDEMA, UNSPECIFIED WHETHER LONG TERM INSULIN USE (H): ICD-10-CM

## 2020-07-18 DIAGNOSIS — N40.1 BENIGN PROSTATIC HYPERPLASIA WITH LOWER URINARY TRACT SYMPTOMS, SYMPTOM DETAILS UNSPECIFIED: ICD-10-CM

## 2020-07-18 DIAGNOSIS — H04.123 DRY EYES: ICD-10-CM

## 2020-07-18 DIAGNOSIS — E11.9 TYPE 2 DIABETES MELLITUS WITHOUT COMPLICATION, WITH LONG-TERM CURRENT USE OF INSULIN (H): ICD-10-CM

## 2020-07-20 ENCOUNTER — TELEPHONE (OUTPATIENT)
Dept: PODIATRY | Facility: CLINIC | Age: 55
End: 2020-07-20

## 2020-07-20 DIAGNOSIS — B35.3 TINEA PEDIS OF BOTH FEET: ICD-10-CM

## 2020-07-20 RX ORDER — FLASH GLUCOSE SENSOR
KIT MISCELLANEOUS
Qty: 2 EACH | Refills: 11 | Status: SHIPPED | OUTPATIENT
Start: 2020-07-20 | End: 2020-07-29

## 2020-07-20 RX ORDER — PEN NEEDLE, DIABETIC 32GX 5/32"
NEEDLE, DISPOSABLE MISCELLANEOUS
Qty: 300 EACH | Refills: 3 | Status: SHIPPED | OUTPATIENT
Start: 2020-07-20 | End: 2022-10-31

## 2020-07-20 RX ORDER — FLASH GLUCOSE SCANNING READER
EACH MISCELLANEOUS
Qty: 1 EACH | Refills: 0 | Status: SHIPPED | OUTPATIENT
Start: 2020-07-20 | End: 2020-07-29

## 2020-07-20 RX ORDER — ECONAZOLE NITRATE 10 MG/G
CREAM TOPICAL DAILY
Qty: 85 G | Refills: 0 | Status: SHIPPED | OUTPATIENT
Start: 2020-07-20 | End: 2020-07-21

## 2020-07-20 NOTE — TELEPHONE ENCOUNTER
PA Initiation    Medication: Econazole Nitrate 1% cream  Insurance Company: [employer] - Phone [phone] Fax [phone]  Pharmacy Filling the Rx: [facility] - [place], [place] - [facility] [address]  Filling Pharmacy Phone: [phone]  Filling Pharmacy Fax: [phone]  Start Date: 7/14/2020

## 2020-07-20 NOTE — TELEPHONE ENCOUNTER
PRIOR AUTHORIZATION DENIED    Medication: Econazole Nitrate 1% cream    Denial Date: 7/20/2020    Denial Rational:     Appeal Information:

## 2020-07-21 RX ORDER — CICLOPIROX OLAMINE 7.7 MG/G
CREAM TOPICAL 2 TIMES DAILY
Qty: 90 G | Refills: 5 | Status: SHIPPED | OUTPATIENT
Start: 2020-07-21 | End: 2022-12-15

## 2020-07-21 RX ORDER — POLYVINYL ALCOHOL/POVIDONE 0.5%-0.6%
1 DROPS OPHTHALMIC (EYE)
Qty: 15 ML | Refills: 1 | Status: SHIPPED | OUTPATIENT
Start: 2020-07-21 | End: 2021-10-04

## 2020-07-21 RX ORDER — TAMSULOSIN HYDROCHLORIDE 0.4 MG/1
CAPSULE ORAL
Qty: 90 CAPSULE | Refills: 1 | Status: SHIPPED | OUTPATIENT
Start: 2020-07-21 | End: 2021-02-22

## 2020-07-21 RX ORDER — ROSUVASTATIN CALCIUM 5 MG/1
5 TABLET, COATED ORAL DAILY
Qty: 30 TABLET | Refills: 1 | Status: SHIPPED | OUTPATIENT
Start: 2020-07-21 | End: 2020-08-20

## 2020-07-21 ASSESSMENT — ENCOUNTER SYMPTOMS
ARTHRALGIAS: 1
BACK PAIN: 1
SKIN CHANGES: 0
NECK MASS: 0
SORE THROAT: 0
POOR WOUND HEALING: 1
TROUBLE SWALLOWING: 1
HYPERTENSION: 1
HYPOTENSION: 0
SYNCOPE: 0
HOARSE VOICE: 1
MYALGIAS: 1
MUSCLE WEAKNESS: 0
TASTE DISTURBANCE: 0
LEG PAIN: 0
SWOLLEN GLANDS: 0
LIGHT-HEADEDNESS: 0
NAIL CHANGES: 1
SINUS CONGESTION: 1
NECK PAIN: 0
COUGH: 1
SMELL DISTURBANCE: 0
POSTURAL DYSPNEA: 0
BRUISES/BLEEDS EASILY: 0
DYSPNEA ON EXERTION: 1
HEMOPTYSIS: 0
SINUS PAIN: 0
SNORES LOUDLY: 0
STIFFNESS: 1
ORTHOPNEA: 0
COUGH DISTURBING SLEEP: 1
PALPITATIONS: 0
SHORTNESS OF BREATH: 1
SPUTUM PRODUCTION: 0
EXERCISE INTOLERANCE: 0
MUSCLE CRAMPS: 0
SLEEP DISTURBANCES DUE TO BREATHING: 0
WHEEZING: 0
JOINT SWELLING: 1

## 2020-07-21 NOTE — TELEPHONE ENCOUNTER
rosuvastatin (CRESTOR) 5 MG tablet   As local print on med list, sent to pharm per my chart request.

## 2020-07-21 NOTE — TELEPHONE ENCOUNTER
Artificial Tear Solution (SM ARTIFICIAL TEARS) SOLN  tamsulosin (FLOMAX) 0.4 MG capsule  As local print on med list. Sent to pharm per my chart request per WO..

## 2020-07-24 ENCOUNTER — TELEPHONE (OUTPATIENT)
Dept: INTERNAL MEDICINE | Facility: CLINIC | Age: 55
End: 2020-07-24

## 2020-07-24 NOTE — TELEPHONE ENCOUNTER
[facility] Call Center    Phone Message    May a detailed message be left on voicemail: yes     Reason for Call: Order(s): Home Care Orders: Other: .hourly nurse 2 times a week for 1 week, 1 time a week for 8 weeks, 3 as needed w/ orders to start on 7/11/20    FYI: pt is refusing to take OLANZapine (ZYPREXA) 15 MG tablet - makes him feel too druggy and he doesn't feel like he has a mental health problem. Pt is out of pantoprazole (PROTONIX) 40 MG EC tablet and will order is soon.    Action Taken: Message routed to:  [facility] pcc    Travel Screening: Not Applicable

## 2020-07-27 ENCOUNTER — TELEPHONE (OUTPATIENT)
Dept: TRANSPLANT | Facility: CLINIC | Age: 55
End: 2020-07-27

## 2020-07-27 DIAGNOSIS — R60.9 SWELLING: Primary | ICD-10-CM

## 2020-07-27 DIAGNOSIS — Z94.4 LIVER TRANSPLANT RECIPIENT (H): ICD-10-CM

## 2020-07-27 RX ORDER — FUROSEMIDE 20 MG
20 TABLET ORAL DAILY
Qty: 7 TABLET | Refills: 0 | Status: SHIPPED | OUTPATIENT
Start: 2020-07-27 | End: 2020-08-20

## 2020-07-27 NOTE — TELEPHONE ENCOUNTER
Patient has swelling in LE. Discussed with DR Christy and DR Rao. Patient to start lasix 20 mg daily. Pt prefers [facility] to fill lasix.

## 2020-07-27 NOTE — PROGRESS NOTES
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    He reports that since May started to have palpitations. They would occur every night. They would wake him from sleep. Occasionally occurring during the day. No lightheadedness, dizziness, or syncope. Frequency has decreased, but still occurs once in awhile at night. Last time was in the middle of the night when an ambulance driving by. No SOB or ROONEY, he lives on 4th floor and can walk up all 4 flights w/o SOB or CP.      PAST MEDICAL HISTORY:  Past Medical History:   Diagnosis Date     Anemia 2013     Arthritis      BPH (benign prostatic hyperplasia)      CAD (coronary artery disease) 4/1/2019     Cholelithiasis      Conductive hearing loss 08/16/2017     Depressive disorder 1986    Suffer effects throughout life     Gastroesophageal reflux disease 12/01/2014     HCC (hepatocellular carcinoma) (H) 1/22/2019     History of diabetic retinopathy 07/2018     HTN (hypertension)      HTN (hypertension) 11/20/2019     Hyperlipidemia      Liver cirrhosis secondary to ESTRADA (H)      Liver transplanted (H) 11/11/2019     Portal vein thrombosis 4/11/2019     Type II diabetes mellitus (H)        CURRENT MEDICATIONS:  Current Outpatient Medications   Medication Sig Dispense Refill     Acetaminophen (TYLENOL) 325 MG CAPS Take 325-650 mg by mouth every 4 hours as needed (pain, fever) 100 capsule 1     Artificial Tear Solution (SM ARTIFICIAL TEARS) SOLN Place 1 drop into the right eye every hour as needed (dry eyes) Apply at least 4 times daily and as needed for dry eye 15 mL 1     aspirin 81 MG EC tablet Take 1 tablet (81 mg) by mouth daily 90 tablet 1     BD VIKTORIA U/F 32G X 4 MM insulin pen needle Use 5 per day 300 each 3     ciclopirox (LOPROX) 0.77 % cream  Apply topically 2 times daily To feet and toenails. 90 g 5     clonazePAM (KLONOPIN) 1 MG tablet Take 1 tablet (1 mg) by mouth nightly as needed for sleep 30 tablet 0     Continuous Blood Gluc  (FREESTYLE CLAUDIA 14 DAY READER) CECILIO Use to read blood sugars as per 's instructions. 1 each 0     Continuous Blood Gluc Sensor (FREESTYLE CLAUDIA 14 DAY SENSOR) MISC Change every 14 days. 2 each 11     ferrous sulfate (FEROSUL) 325 (65 Fe) MG tablet Take 1 tablet (325 mg) by mouth daily (with breakfast) 90 tablet 3     furosemide (LASIX) 20 MG tablet Take 1 tablet (20 mg) by mouth daily for 7 days 7 tablet 0     glucose (BD GLUCOSE) 4 g chewable tablet Take 1 tablet by mouth every hour as needed for low blood sugar 60 tablet 1     insulin aspart (NOVOLOG PEN) 100 UNIT/ML pen Inject 1-15 Units Subcutaneous 3 times daily (with meals) 20 mL 3     insulin degludec (TRESIBA FLEXTOUCH) 100 UNIT/ML pen Inject 22 Units Subcutaneous daily 15 mL 3     lamiVUDine (EPIVIR) 100 MG tablet Take 1 tablet (100 mg) by mouth daily 30 tablet 3     magnesium oxide (MAG-OX) 400 MG tablet Take 1 tablet (400 mg) by mouth every evening 90 tablet 3     Multiple Vitamin (THERAVITE PO) Take 1 tablet by mouth every morning        order for DME 1 Device by Device route daily Knee high compression socks 15-20 mmhg. 1 Device 0     pantoprazole (PROTONIX) 40 MG EC tablet Take 1 tablet (40 mg) by mouth every morning (before breakfast) 30 tablet 0     polyethylene glycol (MIRALAX) 17 g packet Take 1 packet by mouth daily       tacrolimus (GENERIC EQUIVALENT) 1 MG capsule Take 4 capsules (4 mg) by mouth every morning AND 5 capsules (5 mg) every evening. 270 capsule 11     tamsulosin (FLOMAX) 0.4 MG capsule TAKE 1 CAPSULE(0.4 MG) BY MOUTH DAILY 90 capsule 1     vitamin B-12 (CYANOCOBALAMIN) 1000 MCG tablet Take 1 tablet (1,000 mcg) by mouth daily 30 tablet 11     vitamin D3 (CHOLECALCIFEROL) 2000 units (50 mcg) tablet Take 1 tablet (2,000  Units) by mouth daily 90 tablet 3     Continuous Blood Gluc  (FREESTYLE CLAUDIA 14 DAY READER) CECILIO Use to read blood sugars as per 's instructions. 1 each 0     Continuous Blood Gluc Sensor (FREESTYLE CLAUDIA 14 DAY SENSOR) MISC Change every 14 days. 2 each 11     Injection Device for insulin (INPEN 100-VALLEJO-NORY) CECILIO 1 each See Admin Instructions 1 each 1     OLANZapine (ZYPREXA) 15 MG tablet Take 1 tablet (15 mg) by mouth At Bedtime 30 tablet 0     rosuvastatin (CRESTOR) 5 MG tablet Take 1 tablet (5 mg) by mouth daily 30 tablet 1       PAST SURGICAL HISTORY:  Past Surgical History:   Procedure Laterality Date     COLONOSCOPY      2015     COLONOSCOPY N/A 12/6/2019    Procedure: COLONOSCOPY, WITH POLYPECTOMY AND BIOPSY;  Surgeon: Adam Morton MD;  Location:  GI     CV HEART CATHETERIZATION WITH POSSIBLE INTERVENTION N/A 2/26/2019    Procedure: CORS;  Surgeon: Jagdish Hoyt MD;  Location:  HEART CARDIAC CATH LAB     ESOPHAGOSCOPY, GASTROSCOPY, DUODENOSCOPY (EGD), COMBINED N/A 11/17/2016    Procedure: COMBINED ESOPHAGOSCOPY, GASTROSCOPY, DUODENOSCOPY (EGD);  Surgeon: Santi Rosas MD;  Location:  GI     ESOPHAGOSCOPY, GASTROSCOPY, DUODENOSCOPY (EGD), COMBINED N/A 11/17/2017    Procedure: COMBINED ESOPHAGOSCOPY, GASTROSCOPY, DUODENOSCOPY (EGD);  EGD;  Surgeon: Santi Rosas MD;  Location:  GI     ESOPHAGOSCOPY, GASTROSCOPY, DUODENOSCOPY (EGD), COMBINED N/A 12/28/2018    Procedure: EGD;  Surgeon: Santi Rosas MD;  Location: [facility]     ESOPHAGOSCOPY, GASTROSCOPY, DUODENOSCOPY (EGD), COMBINED N/A 12/6/2019    Procedure: ESOPHAGOGASTRODUODENOSCOPY, WITH BIOPSY;  Surgeon: Adam Morton MD;  Location:  GI     ESOPHAGOSCOPY, GASTROSCOPY, DUODENOSCOPY (EGD), COMBINED N/A 2/13/2020    Procedure: ESOPHAGOGASTRODUODENOSCOPY (EGD);  Surgeon: Santi Rsoas MD;  Location:  GI     HEAD & NECK SURGERY      12/2017 at [facility].      IMPLANT GOLD  WEIGHT EYELID Right 2017    Procedure: IMPLANT WEIGHT EYELID;  Right Upper Eyelid Weight, right tarsal strip lower eyelid;  Surgeon: Milana Malave MD;  Location: [facility] OR     IR CHEMO EMBOLIZATION  2019     KNEE SURGERY Left      ORTHOPEDIC SURGERY       PAROTIDECTOMY, RADICAL NECK DISSECTION Right 2017    Procedure: PAROTIDECTOMY, RADICAL NECK DISSECTION;  Right Superfacial Parotidectomy , Facial nerve repair. with [facility] facial nerve monitor.;  Surgeon: Asiya Morgan MD;  Location: [facility] OR     PICC INSERTION Left 2017    4fr SL BioFlo PICC, 44cm in the L basilic vein w/ tip in the low SVC     RETURN LIVER TRANSPLANT N/A 2019    Procedure: Exploratory laparotomy, hematoma evacuation, abdominal washout;  Surgeon: Александр Ramos MD;  Location: [facility] OR     TRANSPLANT LIVER RECIPIENT  DONOR N/A 2019    Procedure: TRANSPLANT, LIVER, RECIPIENT,  DONOR;  Surgeon: Александр Ramos MD;  Location: [facility] OR     VASCULAR SURGERY         ALLERGIES:     Allergies   Allergen Reactions     Codeine Other (See Comments)     Cannot take due to liver  Cannot tolerate oral narcotics     Seasonal Allergies      Sneezing, coughing, runny and itchy eyes       FAMILY HISTORY:  Family History   Problem Relation Age of Onset     Prostate Cancer Maternal Grandfather      Substance Abuse Maternal Grandfather         Alcohol     Colon Cancer Father 60     Pancreatic Cancer Father 60     Prostate Cancer Father      Colorectal Cancer Father      Macular Degeneration Father      Cancer Father      Glaucoma Father      Skin Cancer Father      Colorectal Cancer Maternal Grandmother      Cancer Maternal Grandmother      Substance Abuse Maternal Grandmother         Alcohol     Colorectal Cancer Paternal Grandmother      Cancer Mother      Diabetes Mother          3/2016     Cerebrovascular Disease Mother         Passed away in Feb of this year, 80 years old.     Thyroid Disease Mother       "Depression Mother      Asthma Sister         Had since birth     Thyroid Disease Sister      Depression Sister      Liver Disease No family hx of      Melanoma No family hx of      No family history of premature CAD or sudden death.    SOCIAL HISTORY:  Social History     Tobacco Use     Smoking status: Former Smoker     Packs/day: 6.00     Years: 30.00     Pack years: 180.00     Types: Cigars     Start date: 2016     Last attempt to quit: 10/25/2017     Years since quittin.7     Smokeless tobacco: Former User     Types: Chew     Quit date: 10/31/2017     Tobacco comment: 1 tin per week   Substance Use Topics     Alcohol use: No     Alcohol/week: 0.0 standard drinks     Comment: quit 1996     Drug use: No       ROS:   A comprehensive 14 point review of systems is negative other than as mentioned in HPI.    Exam:  BP (!) 145/82 (BP Location: Right arm, Patient Position: Chair, Cuff Size: Adult Regular)   Pulse 95   Ht 1.753 m (5' 9\")   Wt 75.8 kg (167 lb)   SpO2 100%   BMI 24.66 kg/m    GENERAL APPEARANCE: healthy, alert and no distress  EYES: no icterus, no xanthelasmas  ENT: normal palate, mucosa moist, no central cyanosis  NECK: JVP not elevated  RESPIRATORY: lungs clear to auscultation - no rales, rhonchi or wheezes, no use of accessory muscles, no retractions, respirations are unlabored, normal respiratory rate  CARDIOVASCULAR: regular rhythm, normal S1 with physiologic split S2, no S3 or S4 and no murmur, click or rub.  GI: soft, non tender, bowel sounds normal,no abdominal bruits  EXTREMITIES: trace pitting edema in BLE  NEURO: alert and oriented to person/place/time, normal speech, gait and affect  VASC: Radial, dorsalis pedis and posterior tibialis pulses 2+ bilaterally.  SKIN: no ecchymoses, no rashes.  PSYCH: cooperative, affect appropriate.     Labs:  Reviewed.       Testing/Procedures:    19 Coronary angiogram:  SUMMARY:   1. Severe liver disease undergoing transplant " evaluation.  2. Moderate coronary artery disease of the ostial left main (40-50% stenosis) which does not appear to be flow-limiting based on minimal luminal area of 7.2mm2 by IVUS.  3. Radial artery sheath removed and hemostasis achieved with a TR band.    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    #Palpitaitons:  Were occurring nightly in May and June, but were not associated with lightheadedness, syncope. Has improved over past month without intervention. Zio patch without rare isolated PVCs and couplets.    #Non-obstructive CAD:  As seen on pre-transplant coronary angiogram. No angina. Continue medical therapy with ASA 81 mg PO every day. Restart rosuvastatin 5 mg PO every day. Unclear why was discontinued on discharge on 6/24/20.     Plan discussed with Dr. Ireland  The patient states understanding and is agreeable with plan.     Navid Qureshi MD  Cardiology    ATTENDING ATTESTATION     Patient was seen and evaluated with Dr. Qureshi. History was confirmed personally by me. Labs, imaging studies, EKGs, and telemetry were reviewed. Agree with assessment and plan as outlined above. The note has been edited by me as needed to produce a single, cohesive document.     Total time spent 26 minutes, of which >50% was spent in face-to-face patient evaluation, reviewing data with patient, and coordination of care.     Manjeet Ireland MD  Staff Cardiologist          MANJEET DEL RIO        Answers for HPI/ROS submitted by the patient on 7/21/2020   General Symptoms: No  Skin Symptoms: Yes  HENT Symptoms: Yes  EYE SYMPTOMS: No  HEART SYMPTOMS: Yes  LUNG  SYMPTOMS: Yes  INTESTINAL SYMPTOMS: No  URINARY SYMPTOMS: No  REPRODUCTIVE SYMPTOMS: No  SKELETAL SYMPTOMS: Yes  BLOOD SYMPTOMS: Yes  NERVOUS SYSTEM SYMPTOMS: No  MENTAL HEALTH SYMPTOMS: No  Changes in hair: No  Changes in moles/birth marks: No  Itching: Yes  Rashes: Yes  Changes in nails: Yes  Acne: Yes  Change in facial hair: Yes  Warts: No  Non-healing sores: Yes  Scarring: No  Flaking of skin: No  Color changes of hands/feet in cold : Yes  Sun sensitivity: Yes  Skin thickening: No  Ear pain: No  Ear discharge: Yes  Hearing loss: No  Tinnitus: Yes  Nosebleeds: No  Congestion: Yes  Sinus pain: No  Trouble swallowing: Yes   Voice hoarseness: Yes  Mouth sores: No  Sore throat: No  Tooth pain: No  Gum tenderness: No  Bleeding gums: No  Change in taste: No  Change in sense of smell: No  Dry mouth: Yes  Hearing aid used: No  Neck lump: No  Cough: Yes  Sputum or phlegm: No  Coughing up blood: No  Difficulty breating or shortness of breath: Yes  Snoring: No  Wheezing: No  Difficulty breathing on exertion: Yes  Nighttime Cough: Yes  Difficulty breathing when lying flat: No  Chest pain or pressure: No  Fast or irregular heartbeat: No  Pain in legs with walking: No  Trouble breathing while lying down: No  Fingers or toes appear blue: No  High blood pressure: Yes  Low blood pressure: No  Fainting: No  Murmurs: No  Pacemaker: No  Varicose veins: No  Edema or swelling: Yes  Wake up at night with shortness of breath: No  Light-headedness: No  Exercise intolerance: No  Back pain: Yes  Muscle aches: Yes  Neck pain: No  Swollen joints: Yes  Joint pain: Yes  Bone pain: No  Muscle cramps: No  Muscle weakness: No  Joint stiffness: Yes  Bone fracture: No  Anemia: Yes  Swollen glands: No  Easy bleeding or bruising: No

## 2020-07-27 NOTE — TELEPHONE ENCOUNTER
Verbal orders given to Courtney from [facility], per , for Order(s): Home Care Orders: Other: .hourly nurse 2 times a week for 1 week, 1 time a week for 8 weeks, 3 as needed w/ orders to start on 7/11/20     FYI: pt is refusing to take OLANZapine (ZYPREXA) 15 MG tablet - makes him feel too druggy and he doesn't feel like he has a mental health problem. Pt is out of pantoprazole (PROTONIX) 40 MG EC tablet and will order is soon.  Rosalinda Tijerina LPN 7/27/2020 7:08 AM

## 2020-07-28 ENCOUNTER — MEDICAL CORRESPONDENCE (OUTPATIENT)
Dept: HEALTH INFORMATION MANAGEMENT | Facility: CLINIC | Age: 55
End: 2020-07-28

## 2020-07-28 ENCOUNTER — OFFICE VISIT (OUTPATIENT)
Dept: CARDIOLOGY | Facility: CLINIC | Age: 55
End: 2020-07-28
Attending: INTERNAL MEDICINE
Payer: MEDICARE

## 2020-07-28 VITALS
WEIGHT: 167 LBS | OXYGEN SATURATION: 100 % | DIASTOLIC BLOOD PRESSURE: 82 MMHG | HEIGHT: 69 IN | BODY MASS INDEX: 24.73 KG/M2 | SYSTOLIC BLOOD PRESSURE: 145 MMHG | HEART RATE: 95 BPM

## 2020-07-28 DIAGNOSIS — I25.10 CORONARY ARTERY DISEASE INVOLVING NATIVE CORONARY ARTERY OF NATIVE HEART WITHOUT ANGINA PECTORIS: Primary | ICD-10-CM

## 2020-07-28 PROCEDURE — G0463 HOSPITAL OUTPT CLINIC VISIT: HCPCS | Mod: ZF

## 2020-07-28 PROCEDURE — 99214 OFFICE O/P EST MOD 30 MIN: CPT | Mod: GC | Performed by: INTERNAL MEDICINE

## 2020-07-28 RX ORDER — ROSUVASTATIN CALCIUM 5 MG/1
5 TABLET, COATED ORAL DAILY
Status: DISCONTINUED | OUTPATIENT
Start: 2020-07-28 | End: 2020-07-28

## 2020-07-28 RX ORDER — ROSUVASTATIN CALCIUM 5 MG/1
5 TABLET, COATED ORAL DAILY
Qty: 90 TABLET | Refills: 3 | Status: SHIPPED | OUTPATIENT
Start: 2020-07-28 | End: 2020-07-28

## 2020-07-28 RX ORDER — ROSUVASTATIN CALCIUM 5 MG/1
5 TABLET, COATED ORAL DAILY
Qty: 90 TABLET | Refills: 3 | Status: SHIPPED | OUTPATIENT
Start: 2020-07-28 | End: 2021-08-03

## 2020-07-28 ASSESSMENT — MIFFLIN-ST. JEOR: SCORE: 1577.89

## 2020-07-28 ASSESSMENT — PAIN SCALES - GENERAL: PAINLEVEL: NO PAIN (0)

## 2020-07-28 NOTE — NURSING NOTE
Chief Complaint   Patient presents with     Follow Up      reason for visit: present for palpitations- discuss ZIO patch results     Vitals were taken and medications were reconciled.     Dorothy Sellers  3:25 PM

## 2020-07-28 NOTE — PATIENT INSTRUCTIONS
You were seen at the [facility] clinic today.  You saw Dr. Manjeet Ireland  Here are your Instructions:    1. We are restarting your cholesterol medication called Crestor.  2. Follow up with Dr. Ireland in 1 year.    If you have questions after this visit:  Send a [employer] message or contact Jodi Soriano LPN  Office:  [phone] option #1, then #4 & ask for Jodi (nurse line)  Fax:  [phone]  After Hours:  [phone] option #4 ask to speak to he on-call Cardiologist  Appointments:  [phone] option #1, then option #1

## 2020-07-28 NOTE — LETTER
7/28/2020      RE: Frandy Workman  [address] [facility] [address]       Dear Colleague,    Thank you for the opportunity to participate in the care of your patient, Frandy Workman, at the [facility] HEART [facility] at [facility]. Please see a copy of my visit note below.    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    He reports that since May started to have palpitations. They would occur every night. They would wake him from sleep. Occasionally occurring during the day. No lightheadedness, dizziness, or syncope. Frequency has decreased, but still occurs once in awhile at night. Last time was in the middle of the night when an ambulance driving by. No SOB or ROONEY, he lives on 4th floor and can walk up all 4 flights w/o SOB or CP.      PAST MEDICAL HISTORY:  Past Medical History:   Diagnosis Date     Anemia 2013     Arthritis      BPH (benign prostatic hyperplasia)      CAD (coronary artery disease) 4/1/2019     Cholelithiasis      Conductive hearing loss 08/16/2017     Depressive disorder 1986    Suffer effects throughout life     Gastroesophageal reflux disease 12/01/2014     HCC (hepatocellular carcinoma) (H) 1/22/2019     History of diabetic retinopathy 07/2018     HTN (hypertension)      HTN (hypertension) 11/20/2019     Hyperlipidemia      Liver cirrhosis secondary to ESTRADA (H)      Liver transplanted (H) 11/11/2019     Portal vein thrombosis 4/11/2019     Type II diabetes mellitus (H)        CURRENT MEDICATIONS:  Current Outpatient Medications   Medication Sig Dispense Refill     Acetaminophen (TYLENOL) 325 MG CAPS Take 325-650 mg by mouth every 4 hours as needed (pain, fever) 100 capsule 1     Artificial Tear Solution (SM ARTIFICIAL  TEARS) SOLN Place 1 drop into the right eye every hour as needed (dry eyes) Apply at least 4 times daily and as needed for dry eye 15 mL 1     aspirin 81 MG EC tablet Take 1 tablet (81 mg) by mouth daily 90 tablet 1     BD VIKTORIA U/F 32G X 4 MM insulin pen needle Use 5 per day 300 each 3     ciclopirox (LOPROX) 0.77 % cream Apply topically 2 times daily To feet and toenails. 90 g 5     clonazePAM (KLONOPIN) 1 MG tablet Take 1 tablet (1 mg) by mouth nightly as needed for sleep 30 tablet 0     Continuous Blood Gluc  (FREESTYLE CLAUDIA 14 DAY READER) CECILIO Use to read blood sugars as per 's instructions. 1 each 0     Continuous Blood Gluc Sensor (FREESTYLE CLAUDIA 14 DAY SENSOR) MISC Change every 14 days. 2 each 11     ferrous sulfate (FEROSUL) 325 (65 Fe) MG tablet Take 1 tablet (325 mg) by mouth daily (with breakfast) 90 tablet 3     furosemide (LASIX) 20 MG tablet Take 1 tablet (20 mg) by mouth daily for 7 days 7 tablet 0     glucose (BD GLUCOSE) 4 g chewable tablet Take 1 tablet by mouth every hour as needed for low blood sugar 60 tablet 1     insulin aspart (NOVOLOG PEN) 100 UNIT/ML pen Inject 1-15 Units Subcutaneous 3 times daily (with meals) 20 mL 3     insulin degludec (TRESIBA FLEXTOUCH) 100 UNIT/ML pen Inject 22 Units Subcutaneous daily 15 mL 3     lamiVUDine (EPIVIR) 100 MG tablet Take 1 tablet (100 mg) by mouth daily 30 tablet 3     magnesium oxide (MAG-OX) 400 MG tablet Take 1 tablet (400 mg) by mouth every evening 90 tablet 3     Multiple Vitamin (THERAVITE PO) Take 1 tablet by mouth every morning        order for DME 1 Device by Device route daily Knee high compression socks 15-20 mmhg. 1 Device 0     pantoprazole (PROTONIX) 40 MG EC tablet Take 1 tablet (40 mg) by mouth every morning (before breakfast) 30 tablet 0     polyethylene glycol (MIRALAX) 17 g packet Take 1 packet by mouth daily       tacrolimus (GENERIC EQUIVALENT) 1 MG capsule Take 4 capsules (4 mg) by mouth every morning AND 5  capsules (5 mg) every evening. 270 capsule 11     tamsulosin (FLOMAX) 0.4 MG capsule TAKE 1 CAPSULE(0.4 MG) BY MOUTH DAILY 90 capsule 1     vitamin B-12 (CYANOCOBALAMIN) 1000 MCG tablet Take 1 tablet (1,000 mcg) by mouth daily 30 tablet 11     vitamin D3 (CHOLECALCIFEROL) 2000 units (50 mcg) tablet Take 1 tablet (2,000 Units) by mouth daily 90 tablet 3     Continuous Blood Gluc  (FREESTYLE CLAUDIA 14 DAY READER) CECILIO Use to read blood sugars as per 's instructions. 1 each 0     Continuous Blood Gluc Sensor (FREESTYLE CLAUDIA 14 DAY SENSOR) MISC Change every 14 days. 2 each 11     Injection Device for insulin (INPEN 100-VALLEJO-NORY) CECILIO 1 each See Admin Instructions 1 each 1     OLANZapine (ZYPREXA) 15 MG tablet Take 1 tablet (15 mg) by mouth At Bedtime 30 tablet 0     rosuvastatin (CRESTOR) 5 MG tablet Take 1 tablet (5 mg) by mouth daily 30 tablet 1       PAST SURGICAL HISTORY:  Past Surgical History:   Procedure Laterality Date     COLONOSCOPY      2015     COLONOSCOPY N/A 12/6/2019    Procedure: COLONOSCOPY, WITH POLYPECTOMY AND BIOPSY;  Surgeon: Adam Morton MD;  Location:  GI     CV HEART CATHETERIZATION WITH POSSIBLE INTERVENTION N/A 2/26/2019    Procedure: CORS;  Surgeon: Jagdish Hoyt MD;  Location:  HEART CARDIAC CATH LAB     ESOPHAGOSCOPY, GASTROSCOPY, DUODENOSCOPY (EGD), COMBINED N/A 11/17/2016    Procedure: COMBINED ESOPHAGOSCOPY, GASTROSCOPY, DUODENOSCOPY (EGD);  Surgeon: Santi Rosas MD;  Location:  GI     ESOPHAGOSCOPY, GASTROSCOPY, DUODENOSCOPY (EGD), COMBINED N/A 11/17/2017    Procedure: COMBINED ESOPHAGOSCOPY, GASTROSCOPY, DUODENOSCOPY (EGD);  EGD;  Surgeon: Santi Rosas MD;  Location:  GI     ESOPHAGOSCOPY, GASTROSCOPY, DUODENOSCOPY (EGD), COMBINED N/A 12/28/2018    Procedure: EGD;  Surgeon: Santi Rosas MD;  Location: [facility]     ESOPHAGOSCOPY, GASTROSCOPY, DUODENOSCOPY (EGD), COMBINED N/A 12/6/2019    Procedure:  ESOPHAGOGASTRODUODENOSCOPY, WITH BIOPSY;  Surgeon: Adam Morton MD;  Location:  GI     ESOPHAGOSCOPY, GASTROSCOPY, DUODENOSCOPY (EGD), COMBINED N/A 2020    Procedure: ESOPHAGOGASTRODUODENOSCOPY (EGD);  Surgeon: Santi Rosas MD;  Location:  GI     HEAD & NECK SURGERY      2017 at [facility].      IMPLANT GOLD WEIGHT EYELID Right 2017    Procedure: IMPLANT WEIGHT EYELID;  Right Upper Eyelid Weight, right tarsal strip lower eyelid;  Surgeon: Milana Malave MD;  Location: [facility] OR     IR CHEMO EMBOLIZATION  2019     KNEE SURGERY Left      ORTHOPEDIC SURGERY       PAROTIDECTOMY, RADICAL NECK DISSECTION Right 2017    Procedure: PAROTIDECTOMY, RADICAL NECK DISSECTION;  Right Superfacial Parotidectomy , Facial nerve repair. with [facility] facial nerve monitor.;  Surgeon: Asiya Morgan MD;  Location: [facility] OR     PICC INSERTION Left 2017    4fr SL BioFlo PICC, 44cm in the L basilic vein w/ tip in the low SVC     RETURN LIVER TRANSPLANT N/A 2019    Procedure: Exploratory laparotomy, hematoma evacuation, abdominal washout;  Surgeon: Александр Ramos MD;  Location: [facility] OR     TRANSPLANT LIVER RECIPIENT  DONOR N/A 2019    Procedure: TRANSPLANT, LIVER, RECIPIENT,  DONOR;  Surgeon: Александр Ramos MD;  Location: [facility] OR     VASCULAR SURGERY         ALLERGIES:     Allergies   Allergen Reactions     Codeine Other (See Comments)     Cannot take due to liver  Cannot tolerate oral narcotics     Seasonal Allergies      Sneezing, coughing, runny and itchy eyes       FAMILY HISTORY:  Family History   Problem Relation Age of Onset     Prostate Cancer Maternal Grandfather      Substance Abuse Maternal Grandfather         Alcohol     Colon Cancer Father 60     Pancreatic Cancer Father 60     Prostate Cancer Father      Colorectal Cancer Father      Macular Degeneration Father      Cancer Father      Glaucoma Father      Skin Cancer Father      Colorectal Cancer Maternal  "Grandmother      Cancer Maternal Grandmother      Substance Abuse Maternal Grandmother         Alcohol     Colorectal Cancer Paternal Grandmother      Cancer Mother      Diabetes Mother          3/2016     Cerebrovascular Disease Mother         Passed away in Feb of this year, 80 years old.     Thyroid Disease Mother      Depression Mother      Asthma Sister         Had since birth     Thyroid Disease Sister      Depression Sister      Liver Disease No family hx of      Melanoma No family hx of      No family history of premature CAD or sudden death.    SOCIAL HISTORY:  Social History     Tobacco Use     Smoking status: Former Smoker     Packs/day: 6.00     Years: 30.00     Pack years: 180.00     Types: Cigars     Start date: 2016     Last attempt to quit: 10/25/2017     Years since quittin.7     Smokeless tobacco: Former User     Types: Chew     Quit date: 10/31/2017     Tobacco comment: 1 tin per week   Substance Use Topics     Alcohol use: No     Alcohol/week: 0.0 standard drinks     Comment: quit 1996     Drug use: No       ROS:   A comprehensive 14 point review of systems is negative other than as mentioned in HPI.    Exam:  BP (!) 145/82 (BP Location: Right arm, Patient Position: Chair, Cuff Size: Adult Regular)   Pulse 95   Ht 1.753 m (5' 9\")   Wt 75.8 kg (167 lb)   SpO2 100%   BMI 24.66 kg/m    GENERAL APPEARANCE: healthy, alert and no distress  EYES: no icterus, no xanthelasmas  ENT: normal palate, mucosa moist, no central cyanosis  NECK: JVP not elevated  RESPIRATORY: lungs clear to auscultation - no rales, rhonchi or wheezes, no use of accessory muscles, no retractions, respirations are unlabored, normal respiratory rate  CARDIOVASCULAR: regular rhythm, normal S1 with physiologic split S2, no S3 or S4 and no murmur, click or rub.  GI: soft, non tender, bowel sounds normal,no abdominal bruits  EXTREMITIES: trace pitting edema in BLE  NEURO: alert and oriented to " person/place/time, normal speech, gait and affect  VASC: Radial, dorsalis pedis and posterior tibialis pulses 2+ bilaterally.  SKIN: no ecchymoses, no rashes.  PSYCH: cooperative, affect appropriate.     Labs:  Reviewed.       Testing/Procedures:    2/26/19 Coronary angiogram:  SUMMARY:   1. Severe liver disease undergoing transplant evaluation.  2. Moderate coronary artery disease of the ostial left main (40-50% stenosis) which does not appear to be flow-limiting based on minimal luminal area of 7.2mm2 by IVUS.  3. Radial artery sheath removed and hemostasis achieved with a TR band.    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    #Palpitaitons:  Were occurring nightly in May and June, but were not associated with lightheadedness, syncope. Has improved over past month without intervention. Zio patch without rare isolated PVCs and couplets.    #Non-obstructive CAD:  As seen on pre-transplant coronary angiogram. No angina. Continue medical therapy with ASA 81 mg PO every day. Restart rosuvastatin 5 mg PO every day. Unclear why was discontinued on discharge on 6/24/20.     Plan discussed with Dr. Ireland  The patient states understanding and is agreeable with plan.     Navid Qureshi MD  Cardiology    ATTENDING ATTESTATION     Patient was seen and evaluated with Dr. Qureshi. History was confirmed personally by me. Labs, imaging studies, EKGs, and telemetry were reviewed. Agree with assessment and plan as outlined above. The note has been edited by me as needed to produce a single, cohesive document.     Total time spent 26 minutes, of which >50% was spent in face-to-face  patient evaluation, reviewing data with patient, and coordination of care.     Manjeet Ireland MD  Staff Cardiologist          MANJEET DEL RIO        Answers for HPI/ROS submitted by the patient on 7/21/2020   General Symptoms: No  Skin Symptoms: Yes  HENT Symptoms: Yes  EYE SYMPTOMS: No  HEART SYMPTOMS: Yes  LUNG SYMPTOMS: Yes  INTESTINAL SYMPTOMS: No  URINARY SYMPTOMS: No  REPRODUCTIVE SYMPTOMS: No  SKELETAL SYMPTOMS: Yes  BLOOD SYMPTOMS: Yes  NERVOUS SYSTEM SYMPTOMS: No  MENTAL HEALTH SYMPTOMS: No  Changes in hair: No  Changes in moles/birth marks: No  Itching: Yes  Rashes: Yes  Changes in nails: Yes  Acne: Yes  Change in facial hair: Yes  Warts: No  Non-healing sores: Yes  Scarring: No  Flaking of skin: No  Color changes of hands/feet in cold : Yes  Sun sensitivity: Yes  Skin thickening: No  Ear pain: No  Ear discharge: Yes  Hearing loss: No  Tinnitus: Yes  Nosebleeds: No  Congestion: Yes  Sinus pain: No  Trouble swallowing: Yes   Voice hoarseness: Yes  Mouth sores: No  Sore throat: No  Tooth pain: No  Gum tenderness: No  Bleeding gums: No  Change in taste: No  Change in sense of smell: No  Dry mouth: Yes  Hearing aid used: No  Neck lump: No  Cough: Yes  Sputum or phlegm: No  Coughing up blood: No  Difficulty breating or shortness of breath: Yes  Snoring: No  Wheezing: No  Difficulty breathing on exertion: Yes  Nighttime Cough: Yes  Difficulty breathing when lying flat: No  Chest pain or pressure: No  Fast or irregular heartbeat: No  Pain in legs with walking: No  Trouble breathing while lying down: No  Fingers or toes appear blue: No  High blood pressure: Yes  Low blood pressure: No  Fainting: No  Murmurs: No  Pacemaker: No  Varicose veins: No  Edema or swelling: Yes  Wake up at night with shortness of breath: No  Light-headedness: No  Exercise intolerance: No  Back pain: Yes  Muscle aches: Yes  Neck pain: No  Swollen joints: Yes  Joint pain: Yes  Bone pain: No  Muscle cramps: No  Muscle  weakness: No  Joint stiffness: Yes  Bone fracture: No  Anemia: Yes  Swollen glands: No  Easy bleeding or bruising: No      Please do not hesitate to contact me if you have any questions/concerns.     Sincerely,     Manjeet Ireland MD

## 2020-07-29 ENCOUNTER — TELEPHONE (OUTPATIENT)
Dept: PHARMACY | Facility: CLINIC | Age: 55
End: 2020-07-29

## 2020-07-29 ENCOUNTER — INFUSION THERAPY VISIT (OUTPATIENT)
Dept: INFUSION THERAPY | Facility: CLINIC | Age: 55
End: 2020-07-29
Payer: MEDICARE

## 2020-07-29 VITALS
HEART RATE: 83 BPM | OXYGEN SATURATION: 100 % | DIASTOLIC BLOOD PRESSURE: 74 MMHG | BODY MASS INDEX: 24.6 KG/M2 | SYSTOLIC BLOOD PRESSURE: 149 MMHG | WEIGHT: 166.6 LBS

## 2020-07-29 DIAGNOSIS — Z94.4 LIVER REPLACED BY TRANSPLANT (H): ICD-10-CM

## 2020-07-29 DIAGNOSIS — C22.0 HCC (HEPATOCELLULAR CARCINOMA) (H): Primary | ICD-10-CM

## 2020-07-29 DIAGNOSIS — N18.4 CKD (CHRONIC KIDNEY DISEASE) STAGE 4, GFR 15-29 ML/MIN (H): ICD-10-CM

## 2020-07-29 DIAGNOSIS — N18.4 ANEMIA DUE TO STAGE 4 CHRONIC KIDNEY DISEASE (H): ICD-10-CM

## 2020-07-29 DIAGNOSIS — Z13.220 LIPID SCREENING: ICD-10-CM

## 2020-07-29 DIAGNOSIS — Z94.4 STATUS POST LIVER TRANSPLANTATION (H): ICD-10-CM

## 2020-07-29 DIAGNOSIS — D63.1 ANEMIA DUE TO STAGE 4 CHRONIC KIDNEY DISEASE (H): ICD-10-CM

## 2020-07-29 DIAGNOSIS — Z00.00 HEALTHCARE MAINTENANCE: ICD-10-CM

## 2020-07-29 DIAGNOSIS — C22.0 HCC (HEPATOCELLULAR CARCINOMA) (H): ICD-10-CM

## 2020-07-29 LAB
ALBUMIN SERPL-MCNC: 3.6 G/DL (ref 3.4–5)
ALP SERPL-CCNC: 138 U/L (ref 40–150)
ALT SERPL W P-5'-P-CCNC: 30 U/L (ref 0–70)
ANION GAP SERPL CALCULATED.3IONS-SCNC: 1 MMOL/L (ref 3–14)
AST SERPL W P-5'-P-CCNC: 16 U/L (ref 0–45)
BASOPHILS # BLD AUTO: 0 10E9/L (ref 0–0.2)
BASOPHILS NFR BLD AUTO: 0.5 %
BILIRUB DIRECT SERPL-MCNC: <0.1 MG/DL (ref 0–0.2)
BILIRUB SERPL-MCNC: 0.3 MG/DL (ref 0.2–1.3)
BUN SERPL-MCNC: 38 MG/DL (ref 7–30)
CALCIUM SERPL-MCNC: 8.7 MG/DL (ref 8.5–10.1)
CHLORIDE SERPL-SCNC: 112 MMOL/L (ref 94–109)
CHOLEST SERPL-MCNC: 192 MG/DL
CO2 SERPL-SCNC: 26 MMOL/L (ref 20–32)
CREAT SERPL-MCNC: 1.51 MG/DL (ref 0.66–1.25)
DIFFERENTIAL METHOD BLD: ABNORMAL
EOSINOPHIL # BLD AUTO: 0.2 10E9/L (ref 0–0.7)
EOSINOPHIL NFR BLD AUTO: 4.4 %
ERYTHROCYTE [DISTWIDTH] IN BLOOD BY AUTOMATED COUNT: 13.9 % (ref 10–15)
FERRITIN SERPL-MCNC: 193 NG/ML (ref 26–388)
GFR SERPL CREATININE-BSD FRML MDRD: 51 ML/MIN/{1.73_M2}
GLUCOSE SERPL-MCNC: 211 MG/DL (ref 70–99)
HCT VFR BLD AUTO: 31.7 % (ref 40–53)
HDLC SERPL-MCNC: 39 MG/DL
HGB BLD-MCNC: 10.6 G/DL (ref 13.3–17.7)
IMM GRANULOCYTES # BLD: 0 10E9/L (ref 0–0.4)
IMM GRANULOCYTES NFR BLD: 1 %
IRON SATN MFR SERPL: 38 % (ref 15–46)
IRON SERPL-MCNC: 87 UG/DL (ref 35–180)
LDLC SERPL CALC-MCNC: 117 MG/DL
LYMPHOCYTES # BLD AUTO: 1.3 10E9/L (ref 0.8–5.3)
LYMPHOCYTES NFR BLD AUTO: 33.3 %
MAGNESIUM SERPL-MCNC: 1.9 MG/DL (ref 1.6–2.3)
MCH RBC QN AUTO: 32.8 PG (ref 26.5–33)
MCHC RBC AUTO-ENTMCNC: 33.4 G/DL (ref 31.5–36.5)
MCV RBC AUTO: 98 FL (ref 78–100)
MONOCYTES # BLD AUTO: 0.3 10E9/L (ref 0–1.3)
MONOCYTES NFR BLD AUTO: 7.9 %
NEUTROPHILS # BLD AUTO: 2.1 10E9/L (ref 1.6–8.3)
NEUTROPHILS NFR BLD AUTO: 52.9 %
NONHDLC SERPL-MCNC: 153 MG/DL
PHOSPHATE SERPL-MCNC: 3.1 MG/DL (ref 2.5–4.5)
PLATELET # BLD AUTO: 126 10E9/L (ref 150–450)
POTASSIUM SERPL-SCNC: 5.5 MMOL/L (ref 3.4–5.3)
PROT SERPL-MCNC: 7.2 G/DL (ref 6.8–8.8)
RBC # BLD AUTO: 3.23 10E12/L (ref 4.4–5.9)
SODIUM SERPL-SCNC: 139 MMOL/L (ref 133–144)
TACROLIMUS BLD-MCNC: 4.7 UG/L (ref 5–15)
TIBC SERPL-MCNC: 230 UG/DL (ref 240–430)
TME LAST DOSE: ABNORMAL H
TRIGL SERPL-MCNC: 181 MG/DL
WBC # BLD AUTO: 3.9 10E9/L (ref 4–11)

## 2020-07-29 PROCEDURE — 83540 ASSAY OF IRON: CPT | Performed by: INTERNAL MEDICINE

## 2020-07-29 PROCEDURE — 83735 ASSAY OF MAGNESIUM: CPT | Performed by: INTERNAL MEDICINE

## 2020-07-29 PROCEDURE — 82728 ASSAY OF FERRITIN: CPT | Performed by: INTERNAL MEDICINE

## 2020-07-29 PROCEDURE — 82248 BILIRUBIN DIRECT: CPT | Performed by: INTERNAL MEDICINE

## 2020-07-29 PROCEDURE — 80197 ASSAY OF TACROLIMUS: CPT | Performed by: SURGERY

## 2020-07-29 PROCEDURE — 84100 ASSAY OF PHOSPHORUS: CPT | Performed by: INTERNAL MEDICINE

## 2020-07-29 PROCEDURE — 80053 COMPREHEN METABOLIC PANEL: CPT | Performed by: INTERNAL MEDICINE

## 2020-07-29 PROCEDURE — 80061 LIPID PANEL: CPT | Performed by: INTERNAL MEDICINE

## 2020-07-29 PROCEDURE — 99207 ZZC NO CHARGE NURSE ONLY: CPT | Performed by: NURSE PRACTITIONER

## 2020-07-29 PROCEDURE — 85025 COMPLETE CBC W/AUTO DIFF WBC: CPT | Performed by: INTERNAL MEDICINE

## 2020-07-29 PROCEDURE — 36415 COLL VENOUS BLD VENIPUNCTURE: CPT | Performed by: INTERNAL MEDICINE

## 2020-07-29 PROCEDURE — 83550 IRON BINDING TEST: CPT | Performed by: INTERNAL MEDICINE

## 2020-07-29 ASSESSMENT — PAIN SCALES - GENERAL: PAINLEVEL: NO PAIN (0)

## 2020-07-29 NOTE — PROGRESS NOTES
Infusion Nursing Note:  Frandy Workman presents today for   Chief Complaint   Patient presents with     Allied Health Visit     Aranesp     Patient seen by provider today: No   present during visit today: Not Applicable.    Note: Patient assessed by Jennifer Moncada RN. See Note    Intravenous Access:  No Intravenous access/labs at this visit.    Treatment Conditions:  Lab Results   Component Value Date    HGB 10.6 07/29/2020     Lab Results   Component Value Date    WBC 3.9 07/29/2020      Lab Results   Component Value Date    ANEU 2.1 07/29/2020     Lab Results   Component Value Date     07/29/2020      Lab Results   Component Value Date     07/29/2020                   Lab Results   Component Value Date    POTASSIUM 5.5 07/29/2020           Lab Results   Component Value Date    MAG 1.9 07/29/2020            Lab Results   Component Value Date    CR 1.51 07/29/2020                   Lab Results   Component Value Date    DEBORAH 8.7 07/29/2020                Lab Results   Component Value Date    BILITOTAL 0.3 07/29/2020           Lab Results   Component Value Date    ALBUMIN 3.6 07/29/2020                    Lab Results   Component Value Date    ALT 30 07/29/2020           Lab Results   Component Value Date    AST 16 07/29/2020       Results reviewed, labs did NOT meet treatment parameters: See Note.      Post Infusion Assessment:  Not Applicable.       Discharge Plan:   Patient discharged in stable condition accompanied by: self.    Anne-Marie Og CMA

## 2020-07-29 NOTE — TELEPHONE ENCOUNTER
pantoprazole (PROTONIX) 40 MG EC tablet       Last Written Prescription Date:  6-26-20  Last Fill Quantity: 60,   # refills: 0  Last Office Visit : 6-6-20  Future Office visit:  7-30-20    Past medication hx: IP only and at hospital discharge     Routing refill request to provider for review/approval because:  Last fill at hospital discharge

## 2020-07-29 NOTE — TELEPHONE ENCOUNTER
Anemia Management Note  SUBJECTIVE/OBJECTIVE:  Referred by Dr. Eloy Rao on 3/2/2020  Primary Diagnosis: Anemia in Chronic Kidney Disease (N18.4, D63.1)     Secondary Diagnosis:  Chronic Kidney Disease, Stage 4 (N18.4)                 Liver Transplant: 2019  Hgb goal range:  10-11.5 per Dr. Rao's referral  Epo/Darbo: Aranesp 40mcg every 14 days for Hgb <11.5.  In Clinic.  Iron regimen:  Ferrous Sulfate 325mg once daily - was taking three times daily, decreased in   Labs : Hemoglobin - has standing order for CBC/platelets twice weekly - expires 2020  Iron Labs: 3/3/2021  Recent IVELISSE use, transfusion, IV iron: PRBC on 3/3  RX/TX plans : TBD  No history of stroke, MI and blood clots.  History of hepatocellular carcinoma - s/p TACE 2019 and liver transplant 2019     Contact:            Ok to leave message regarding scheduling, medical, and billing per consent to communicate dated 10/2/19                              OK to speak with Davi Saunders (friend/POA) regarding scheduling, medical, and billing per consent to communicate dated 10/2/19    Anemia Latest Ref Rng & Units 2020 2020 2020 2020 2020 7/15/2020 2020   IVELISSE Dose - - - - 40mcg - - -   Hemoglobin 13.3 - 17.7 g/dL 9.3(L) 8.7(L) 9.0(L) - 7.9(L) 9.9(L) 10.6(L)   TSAT 15 - 46 % -  - - - - -   Ferritin 26 - 388 ng/mL - 352 - - - - -   PRBCs - - - - - - - -     BP Readings from Last 3 Encounters:   20 (!) 149/74   20 (!) 145/82   07/15/20 (!) 150/77     Wt Readings from Last 2 Encounters:   20 75.6 kg (166 lb 9.6 oz)   20 75.8 kg (167 lb)       Range for Aranesp is 10-11.5.  Hgb has increased from 7.9 to 10.6 in 4 weeks.  Spoke with Jennifer at  [facility]fusion Center. Will hold Aranesp again today and recheck Hgb in 2 weeks.    ASSESSMENT:  Hgb:At goal but rapid rise in hgb - recommend hold dose  TSat: pending Ferritin: Pending    PLAN:  Hold Aranesp and RTC for hgb then  aranesp if needed in 2 week(s)    Orders needed to be renewed (for next follow-up date) in EPIC: None    Iron labs due:  Pending from today    Plan discussed with:  Jennifer BYRNE at [facility]  Plan provided by:  adri    NEXT FOLLOW-UP DATE:  8/12/20    Jie Blum RN   Anemia Services  [address] [address]   [email or web address]   Office : [phone]  Fax: [phone]
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
## 2020-07-30 ENCOUNTER — VIRTUAL VISIT (OUTPATIENT)
Dept: INTERNAL MEDICINE | Facility: CLINIC | Age: 55
End: 2020-07-30
Payer: MEDICARE

## 2020-07-30 DIAGNOSIS — N18.4 CKD (CHRONIC KIDNEY DISEASE) STAGE 4, GFR 15-29 ML/MIN (H): ICD-10-CM

## 2020-07-30 DIAGNOSIS — Z94.4 STATUS POST LIVER TRANSPLANTATION (H): ICD-10-CM

## 2020-07-30 DIAGNOSIS — E11.3293 TYPE 2 DIABETES MELLITUS WITH MILD NONPROLIFERATIVE RETINOPATHY OF BOTH EYES WITHOUT MACULAR EDEMA, UNSPECIFIED WHETHER LONG TERM INSULIN USE (H): ICD-10-CM

## 2020-07-30 DIAGNOSIS — N18.4 ANEMIA OF CHRONIC RENAL FAILURE, STAGE 4 (SEVERE) (H): ICD-10-CM

## 2020-07-30 DIAGNOSIS — Z76.89 ENCOUNTER TO ESTABLISH CARE WITH NEW DOCTOR: Primary | ICD-10-CM

## 2020-07-30 DIAGNOSIS — I10 HYPERTENSION, UNSPECIFIED TYPE: ICD-10-CM

## 2020-07-30 DIAGNOSIS — D63.1 ANEMIA OF CHRONIC RENAL FAILURE, STAGE 4 (SEVERE) (H): ICD-10-CM

## 2020-07-30 DIAGNOSIS — C22.0 HCC (HEPATOCELLULAR CARCINOMA) (H): ICD-10-CM

## 2020-07-30 NOTE — PROGRESS NOTES
"Frandy Workman is a 55 year old male who is being evaluated via a billable video visit.      The patient has been notified of following:     \"This video visit will be conducted via a call between you and your physician/provider. We have found that certain health care needs can be provided without the need for an in-person physical exam.  This service lets us provide the care you need with a video conversation.  If a prescription is necessary we can send it directly to your pharmacy.  If lab work is needed we can place an order for that and you can then stop by our lab to have the test done at a later time.    Video visits are billed at different rates depending on your insurance coverage.  Please reach out to your insurance provider with any questions.    If during the course of the call the physician/provider feels a video visit is not appropriate, you will not be charged for this service.\"    Patient has given verbal consent for Video visit? Yes  How would you like to obtain your AVS? MyChart  If you are dropped from the video visit, the video invite should be resent to: Text to cell phone: [phone]   Will anyone else be joining your video visit? No      "

## 2020-07-30 NOTE — PROGRESS NOTES
Virtual video visit    CC: Establish care, former IM resident    Other health team members:  PharmALEX Blum, Arie Welsh, Donald Bradley  Cardiology Dr. LORIE Ireland  GI/hepatology Dr. DINO Banuelos  Transplant Dr. SHAD Ramos,    Transplant coordinator ?Radha Ndiaye RN  Podiatry Dr. LORIE Gonzalez  Endocrinology LILLIAN Toscano,  Dr. Wilcox  Psychiatry Dr. RAMAN Davison  Nephrology Dr. BRYANT Rao  Oncology NP AZIZA Wilson, Dr. GARFIELD Villarreal  Dermatology Dr. ALEX Smith  Ophthalmology Dr. PEREIRA [place]  Transplant  Dilan Pratt  Radiology Dr. KELLI Scott    HPI:  55 year old male  PMHx notable for Type 2 DM with retinopathy, HCC, CAD, liver transplantation, immunosuppressed status, HTN, malnutrition, CKD4, MDD, bipolar DO, anemia chronic renal failure    States feeling well, trying to get caughte  Nephrology and hepatology following, renal function improving, leg swelling--starting on furosemide 3 days ago (awating shopment from pharmacy), cardiology monitoring, starting rosuvastatin, states lipid panel has been improving but HDL still low, hoping rosuvastatin wiill improve hhis panel (awating rx delivery, ordered two days ago), travelling August 21st to [place] for three weeks (driving with cousins x eleven hours over three days), cousin is RN (I advised against this given COVID pandemiemic and he prefers to take the risk of developing COVID, states does not want to take psychiatric medication, is willing to see a psychologist but frustrated with availability, states sleeping six to seven hours a night, not taking Zyprexa, gaining weight, has appointment in September now for psychology, believes he does not have bipolar DO, has filed complaints against this diagnosis on his problem list, believes psychiatrict symptoms related to his diabetes and kidney disease, has been practicing strategies to calm down, making to do lists, paying attension to promoting sleep, trying to adjust to acceptance of his chronic diseases, taking Aricept  shots, hemoglobin most recently in 10 range, last Aricept about a month ago, lately has felt a little more tired, and is trying to honor his feelings and not push it activity-wise, adjusting to knowledge that he will have to see several doctors on a chronic basis, appreciated Dr. Tucker (IM resident) care and would like to f/u in 6 monthis with Primary Care, answered questions about what is a Medicare Wellness, he will schedule this in the next couple months, othewise f/u 6 months.        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, unspecified whether long term insulin use (H)     Status post coronary angiogram     CAD (coronary artery disease)     Liver transplant recipient (H)     Status post liver transplantation (H)     Immunosuppressed status (H)     HTN (hypertension)     Malnutrition related to chronic disease (H)     Hypophosphatasia     CKD (chronic kidney disease), stage III (H)     Malnutrition (H)     Anemia     Anxiety     Mild recurrent major depression (H)     Low hemoglobin     CKD (chronic kidney disease) stage 4, GFR 15-29 ml/min (H)     Anemia of chronic renal failure, stage 4 (severe) (H)     Rekha (H)     Past Medical History:   Diagnosis Date     Anemia 2013     Arthritis      BPH (benign prostatic hyperplasia)      CAD (coronary artery disease) 4/1/2019     Cholelithiasis      Conductive hearing loss 08/16/2017     Depressive disorder 1986    Suffer effects throughout life     Gastroesophageal reflux disease 12/01/2014     HCC (hepatocellular carcinoma) (H) 1/22/2019     History of diabetic retinopathy 07/2018      HTN (hypertension)      HTN (hypertension) 11/20/2019     Hyperlipidemia      Liver cirrhosis secondary to ESTRADA (H)      Liver transplanted (H) 11/11/2019     Portal vein thrombosis 4/11/2019     Type II diabetes mellitus (H)      Past Surgical History:   Procedure Laterality Date     COLONOSCOPY      2015     COLONOSCOPY N/A 12/6/2019    Procedure: COLONOSCOPY, WITH POLYPECTOMY AND BIOPSY;  Surgeon: Adam Morton MD;  Location:  GI     CV HEART CATHETERIZATION WITH POSSIBLE INTERVENTION N/A 2/26/2019    Procedure: CORS;  Surgeon: Jagdish Hoyt MD;  Location:  HEART CARDIAC CATH LAB     ESOPHAGOSCOPY, GASTROSCOPY, DUODENOSCOPY (EGD), COMBINED N/A 11/17/2016    Procedure: COMBINED ESOPHAGOSCOPY, GASTROSCOPY, DUODENOSCOPY (EGD);  Surgeon: Santi Rosas MD;  Location:  GI     ESOPHAGOSCOPY, GASTROSCOPY, DUODENOSCOPY (EGD), COMBINED N/A 11/17/2017    Procedure: COMBINED ESOPHAGOSCOPY, GASTROSCOPY, DUODENOSCOPY (EGD);  EGD;  Surgeon: Santi Rosas MD;  Location:  GI     ESOPHAGOSCOPY, GASTROSCOPY, DUODENOSCOPY (EGD), COMBINED N/A 12/28/2018    Procedure: EGD;  Surgeon: Santi Rosas MD;  [facility]     ESOPHAGOSCOPY, GASTROSCOPY, DUODENOSCOPY (EGD), COMBINED N/A 12/6/2019    Procedure: ESOPHAGOGASTRODUODENOSCOPY, WITH BIOPSY;  Surgeon: Adam Morton MD;  Location:  GI     ESOPHAGOSCOPY, GASTROSCOPY, DUODENOSCOPY (EGD), COMBINED N/A 2/13/2020    Procedure: ESOPHAGOGASTRODUODENOSCOPY (EGD);  Surgeon: Santi Rosas MD;  Location:  GI     HEAD & NECK SURGERY      12/2017 at [facility].      IMPLANT GOLD WEIGHT EYELID Right 11/16/2017    Procedure: IMPLANT WEIGHT EYELID;  Right Upper Eyelid Weight, right tarsal strip lower eyelid;  Surgeon: Milana Malave MD;  Location: [facility] OR     IR CHEMO EMBOLIZATION  1/22/2019     KNEE SURGERY Left      ORTHOPEDIC SURGERY       PAROTIDECTOMY, RADICAL NECK DISSECTION Right 11/2/2017    Procedure: PAROTIDECTOMY, RADICAL  NECK DISSECTION;  Right Superfacial Parotidectomy , Facial nerve repair. with [facility] facial nerve monitor.;  Surgeon: Asiya Morgan MD;  Location: [facility] OR     PICC INSERTION Left 2017    4fr SL BioFlo PICC, 44cm in the L basilic vein w/ tip in the low SVC     RETURN LIVER TRANSPLANT N/A 2019    Procedure: Exploratory laparotomy, hematoma evacuation, abdominal washout;  Surgeon: Александр Ramos MD;  Location: [facility] OR     TRANSPLANT LIVER RECIPIENT  DONOR N/A 2019    Procedure: TRANSPLANT, LIVER, RECIPIENT,  DONOR;  Surgeon: Александр Ramos MD;  Location: [facility] OR     VASCULAR SURGERY       Family History   Problem Relation Age of Onset     Prostate Cancer Maternal Grandfather      Substance Abuse Maternal Grandfather         Alcohol     Colon Cancer Father 60     Pancreatic Cancer Father 60     Prostate Cancer Father      Colorectal Cancer Father      Macular Degeneration Father      Cancer Father      Glaucoma Father      Skin Cancer Father      Colorectal Cancer Maternal Grandmother      Cancer Maternal Grandmother      Substance Abuse Maternal Grandmother         Alcohol     Colorectal Cancer Paternal Grandmother      Cancer Mother      Diabetes Mother          3/2016     Cerebrovascular Disease Mother         Passed away in Feb of this year, 80 years old.     Thyroid Disease Mother      Depression Mother      Asthma Sister         Had since birth     Thyroid Disease Sister      Depression Sister      Liver Disease No family hx of      Melanoma No family hx of      Social History     Tobacco Use     Smoking status: Former Smoker     Packs/day: 6.00     Years: 30.00     Pack years: 180.00     Types: Cigars     Start date: 2016     Last attempt to quit: 10/25/2017     Years since quittin.7     Smokeless tobacco: Former User     Types: Chew     Quit date: 10/31/2017     Tobacco comment: 1 tin per week   Substance Use Topics     Alcohol use: No     Alcohol/week: 0.0  standard drinks     Comment: quit Sept. 1996     Drug use: No     Social History     Social History Narrative     Not on file     Current Outpatient Medications   Medication Sig Dispense Refill     Acetaminophen (TYLENOL) 325 MG CAPS Take 325-650 mg by mouth every 4 hours as needed (pain, fever) 100 capsule 1     Artificial Tear Solution (SM ARTIFICIAL TEARS) SOLN Place 1 drop into the right eye every hour as needed (dry eyes) Apply at least 4 times daily and as needed for dry eye 15 mL 1     aspirin 81 MG EC tablet Take 1 tablet (81 mg) by mouth daily 90 tablet 1     BD VIKTORIA U/F 32G X 4 MM insulin pen needle Use 5 per day 300 each 3     ciclopirox (LOPROX) 0.77 % cream Apply topically 2 times daily To feet and toenails. 90 g 5     clonazePAM (KLONOPIN) 1 MG tablet Take 1 tablet (1 mg) by mouth nightly as needed for sleep 30 tablet 0     Continuous Blood Gluc  (NextPotentialYLE CLAUDIA 14 DAY READER) CECILIO Use to read blood sugars as per 's instructions. 1 each 0     Continuous Blood Gluc Sensor (YellowPepperSTYLE CLAUDIA 14 DAY SENSOR) MISC Change every 14 days. 2 each 11     ferrous sulfate (FEROSUL) 325 (65 Fe) MG tablet Take 1 tablet (325 mg) by mouth daily (with breakfast) 90 tablet 3     furosemide (LASIX) 20 MG tablet Take 1 tablet (20 mg) by mouth daily for 7 days 7 tablet 0     glucose (BD GLUCOSE) 4 g chewable tablet Take 1 tablet by mouth every hour as needed for low blood sugar 60 tablet 1     insulin aspart (NOVOLOG PEN) 100 UNIT/ML pen Inject 1-15 Units Subcutaneous 3 times daily (with meals) 20 mL 3     insulin degludec (TRESIBA FLEXTOUCH) 100 UNIT/ML pen Inject 22 Units Subcutaneous daily 15 mL 3     lamiVUDine (EPIVIR) 100 MG tablet Take 1 tablet (100 mg) by mouth daily 30 tablet 3     magnesium oxide (MAG-OX) 400 MG tablet Take 1 tablet (400 mg) by mouth every evening 90 tablet 3     Multiple Vitamin (THERAVITE PO) Take 1 tablet by mouth every morning        order for DME 1 Device by Device route  daily Knee high compression socks 15-20 mmhg. 1 Device 0     pantoprazole (PROTONIX) 40 MG EC tablet Take 1 tablet (40 mg) by mouth every morning (before breakfast) 30 tablet 0     polyethylene glycol (MIRALAX) 17 g packet Take 1 packet by mouth daily       rosuvastatin (CRESTOR) 5 MG tablet Take 1 tablet (5 mg) by mouth daily 90 tablet 3     rosuvastatin (CRESTOR) 5 MG tablet Take 1 tablet (5 mg) by mouth daily 30 tablet 1     tacrolimus (GENERIC EQUIVALENT) 1 MG capsule Take 4 capsules (4 mg) by mouth every morning AND 5 capsules (5 mg) every evening. 270 capsule 11     tamsulosin (FLOMAX) 0.4 MG capsule TAKE 1 CAPSULE(0.4 MG) BY MOUTH DAILY 90 capsule 1     vitamin B-12 (CYANOCOBALAMIN) 1000 MCG tablet Take 1 tablet (1,000 mcg) by mouth daily 30 tablet 11     vitamin D3 (CHOLECALCIFEROL) 2000 units (50 mcg) tablet Take 1 tablet (2,000 Units) by mouth daily 90 tablet 3     OLANZapine (ZYPREXA) 15 MG tablet Take 1 tablet (15 mg) by mouth At Bedtime 30 tablet 0     Allergies   Allergen Reactions     Codeine Other (See Comments)     Cannot take due to liver  Cannot tolerate oral narcotics     Seasonal Allergies      Sneezing, coughing, runny and itchy eyes     BP Readings from Last 6 Encounters:   07/29/20 (!) 149/74   07/28/20 (!) 145/82   07/15/20 (!) 150/77   07/01/20 136/72   07/01/20 101/44   06/26/20 129/69     Wt Readings from Last 5 Encounters:   07/29/20 75.6 kg (166 lb 9.6 oz)   07/28/20 75.8 kg (167 lb)   07/15/20 73.4 kg (161 lb 12.8 oz)   07/01/20 70.3 kg (155 lb)   07/01/20 70.4 kg (155 lb 3.2 oz)     Gen appearance: Appears well, spoke continuously with rare interruption about the topics listed in the HPI:    Frandy was seen today for establish care.    Diagnoses and all orders for this visit:    Encounter to establish care with new doctor    Status post liver transplantation (H)    Type 2 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, unspecified whether  "long term insulin use (H)    HCC (hepatocellular carcinoma) (H)    Hypertension, unspecified type    CKD (chronic kidney disease) stage 4, GFR 15-29 ml/min (H)    Anemia of chronic renal failure, stage 4 (severe) (H)      F/U 6 months, Medicare Wellness visit before that    This patient is being evaluated via a billable video visit as an alternative to an in-person visit.       The patient has been notified of following:     \"This video visit will be conducted via a call between you and your physician/provider. We have found that certain health care needs can be provided without the need for an in-person physical exam.  This service lets us provide the care you need with a video conversation.  If a prescription is necessary we can send it directly to your pharmacy.  If lab work is needed we can place an order for that and you can then stop by our lab to have the test done at a later time. If during the course of the call the physician/provider feels a video visit is not appropriate, you will not be charged for this service.\"     Patient has given verbal consent for virtual video visit? Yes  Did patient initiate this virtual visit? Yes    Person spoken to:  Patient    This was a synchronous virtual visit  Location of patient: home  Location of physician:  home office  Department name:  Medicine  Mode of communication:  Video Conference via Doximity    Time video initiated:  8:03  Time video ended:  8:22  Total length of video visit: 19 min    Nerissa Moe M.D.  Internal Medicine  [facility]   pager [phone]          "

## 2020-07-30 NOTE — NURSING NOTE
Chief Complaint   Patient presents with     Establish Care     pt would like to re-establish care from Dr.Jasurda Eun Awad, CMA, EMT at 7:55 AM on 7/30/2020.

## 2020-07-31 ENCOUNTER — TELEPHONE (OUTPATIENT)
Dept: NEPHROLOGY | Facility: CLINIC | Age: 55
End: 2020-07-31

## 2020-07-31 NOTE — TELEPHONE ENCOUNTER
[facility] Call Center    Phone Message    May a detailed message be left on voicemail: yes     Reason for Call: Other: . pharmacy is calling in asking for a status update. Notified pharmacy of 72 hour turn around time.    Action Taken: Message routed to:  [facility]    Travel Screening: Not Applicable                  RX for Protonix sent to pt's pharmacy.  Dennise Augustin RN 8:48 AM on 8/3/2020.

## 2020-07-31 NOTE — TELEPHONE ENCOUNTER
Spoke to patient.  He reports that he just received lasix today. Reports that the swelling in his ankles is 3 times worse on the right ankle. He reports pain at the shin, when he presses down and a slight rash on the calf compared to the left which he reports is a little better but 2 times more swollen than usual.   Patient reports BP  150/75 and states has been higher the last few times checked.   Weight 165 up from 155 2 weeks ago.  Writer asked if he could take a few pictures and upload to my chart to send to Dr. Rao. Will route message to him as well and CHAVEZ Chen Transplant coordinator.  Lupe Owens LPN  Nephrology  [phone]

## 2020-08-03 DIAGNOSIS — R60.9 SWELLING: ICD-10-CM

## 2020-08-03 RX ORDER — PANTOPRAZOLE SODIUM 40 MG/1
40 TABLET, DELAYED RELEASE ORAL
Qty: 30 TABLET | Refills: 1 | Status: SHIPPED | OUTPATIENT
Start: 2020-08-03 | End: 2020-10-01

## 2020-08-03 RX ORDER — FUROSEMIDE 20 MG
TABLET ORAL
Qty: 7 TABLET | Refills: 0 | OUTPATIENT
Start: 2020-08-03

## 2020-08-06 ENCOUNTER — TELEPHONE (OUTPATIENT)
Dept: NEPHROLOGY | Facility: CLINIC | Age: 55
End: 2020-08-06

## 2020-08-06 DIAGNOSIS — Z94.4 LIVER REPLACED BY TRANSPLANT (H): ICD-10-CM

## 2020-08-06 DIAGNOSIS — Z13.220 LIPID SCREENING: ICD-10-CM

## 2020-08-06 LAB
ALBUMIN SERPL-MCNC: 3.9 G/DL (ref 3.4–5)
ALP SERPL-CCNC: 158 U/L (ref 40–150)
ALT SERPL W P-5'-P-CCNC: 31 U/L (ref 0–70)
ANION GAP SERPL CALCULATED.3IONS-SCNC: 4 MMOL/L (ref 3–14)
AST SERPL W P-5'-P-CCNC: 18 U/L (ref 0–45)
BILIRUB DIRECT SERPL-MCNC: <0.1 MG/DL (ref 0–0.2)
BILIRUB SERPL-MCNC: 0.4 MG/DL (ref 0.2–1.3)
BUN SERPL-MCNC: 57 MG/DL (ref 7–30)
CALCIUM SERPL-MCNC: 8.8 MG/DL (ref 8.5–10.1)
CHLORIDE SERPL-SCNC: 106 MMOL/L (ref 94–109)
CO2 SERPL-SCNC: 26 MMOL/L (ref 20–32)
CREAT SERPL-MCNC: 1.73 MG/DL (ref 0.66–1.25)
ERYTHROCYTE [DISTWIDTH] IN BLOOD BY AUTOMATED COUNT: 13.4 % (ref 10–15)
GFR SERPL CREATININE-BSD FRML MDRD: 43 ML/MIN/{1.73_M2}
GLUCOSE SERPL-MCNC: 289 MG/DL (ref 70–99)
HCT VFR BLD AUTO: 35.1 % (ref 40–53)
HGB BLD-MCNC: 11.7 G/DL (ref 13.3–17.7)
MAGNESIUM SERPL-MCNC: 1.9 MG/DL (ref 1.6–2.3)
MCH RBC QN AUTO: 33 PG (ref 26.5–33)
MCHC RBC AUTO-ENTMCNC: 33.3 G/DL (ref 31.5–36.5)
MCV RBC AUTO: 99 FL (ref 78–100)
PHOSPHATE SERPL-MCNC: 3.8 MG/DL (ref 2.5–4.5)
PLATELET # BLD AUTO: 108 10E9/L (ref 150–450)
POTASSIUM SERPL-SCNC: 5.2 MMOL/L (ref 3.4–5.3)
PROT SERPL-MCNC: 7.9 G/DL (ref 6.8–8.8)
RBC # BLD AUTO: 3.55 10E12/L (ref 4.4–5.9)
SODIUM SERPL-SCNC: 137 MMOL/L (ref 133–144)
TACROLIMUS BLD-MCNC: 8.5 UG/L (ref 5–15)
TME LAST DOSE: 2200 H
WBC # BLD AUTO: 3.8 10E9/L (ref 4–11)

## 2020-08-06 PROCEDURE — 80197 ASSAY OF TACROLIMUS: CPT | Performed by: SURGERY

## 2020-08-06 NOTE — TELEPHONE ENCOUNTER
Left voice message for patient today, regarding lasix and swelling. Provided number for call back.  Lupe Owens LPN  Nephrology  [phone]

## 2020-08-06 NOTE — TELEPHONE ENCOUNTER
Patient called back and reports that the swelling in his ankles is gone and much improved now and /75 and states that he is finished with lasix, no more to take.    Reviewed with Dr. Rao, no changes, no more lasix. Cr 1.73 is ok.     Communicated to patient recommendations per Dr. Rao, encouraged him to call the clinic with any questions or concerns.  Patient verbalized understanding.    Lupe Owens LPN  Nephrology  [phone]

## 2020-08-12 ENCOUNTER — TELEPHONE (OUTPATIENT)
Dept: TRANSPLANT | Facility: CLINIC | Age: 55
End: 2020-08-12

## 2020-08-12 ENCOUNTER — INFUSION THERAPY VISIT (OUTPATIENT)
Dept: INFUSION THERAPY | Facility: CLINIC | Age: 55
End: 2020-08-12
Payer: MEDICARE

## 2020-08-12 ENCOUNTER — TELEPHONE (OUTPATIENT)
Dept: PHARMACY | Facility: CLINIC | Age: 55
End: 2020-08-12

## 2020-08-12 VITALS
WEIGHT: 169.3 LBS | HEART RATE: 77 BPM | DIASTOLIC BLOOD PRESSURE: 76 MMHG | TEMPERATURE: 97.3 F | RESPIRATION RATE: 16 BRPM | SYSTOLIC BLOOD PRESSURE: 137 MMHG | BODY MASS INDEX: 25 KG/M2 | OXYGEN SATURATION: 100 %

## 2020-08-12 DIAGNOSIS — N18.4 ANEMIA OF CHRONIC RENAL FAILURE, STAGE 4 (SEVERE) (H): Primary | ICD-10-CM

## 2020-08-12 DIAGNOSIS — D63.1 ANEMIA OF CHRONIC RENAL FAILURE, STAGE 4 (SEVERE) (H): ICD-10-CM

## 2020-08-12 DIAGNOSIS — N18.4 CKD (CHRONIC KIDNEY DISEASE) STAGE 4, GFR 15-29 ML/MIN (H): Primary | ICD-10-CM

## 2020-08-12 DIAGNOSIS — Z94.4 LIVER REPLACED BY TRANSPLANT (H): ICD-10-CM

## 2020-08-12 DIAGNOSIS — D63.1 ANEMIA OF CHRONIC RENAL FAILURE, STAGE 4 (SEVERE) (H): Primary | ICD-10-CM

## 2020-08-12 DIAGNOSIS — N18.4 ANEMIA OF CHRONIC RENAL FAILURE, STAGE 4 (SEVERE) (H): ICD-10-CM

## 2020-08-12 DIAGNOSIS — Z13.220 LIPID SCREENING: ICD-10-CM

## 2020-08-12 LAB
ALBUMIN SERPL-MCNC: 3.7 G/DL (ref 3.4–5)
ALP SERPL-CCNC: 118 U/L (ref 40–150)
ALT SERPL W P-5'-P-CCNC: 33 U/L (ref 0–70)
ANION GAP SERPL CALCULATED.3IONS-SCNC: 4 MMOL/L (ref 3–14)
AST SERPL W P-5'-P-CCNC: 19 U/L (ref 0–45)
BILIRUB DIRECT SERPL-MCNC: <0.1 MG/DL (ref 0–0.2)
BILIRUB SERPL-MCNC: 0.4 MG/DL (ref 0.2–1.3)
BUN SERPL-MCNC: 40 MG/DL (ref 7–30)
CALCIUM SERPL-MCNC: 8.5 MG/DL (ref 8.5–10.1)
CHLORIDE SERPL-SCNC: 112 MMOL/L (ref 94–109)
CO2 SERPL-SCNC: 26 MMOL/L (ref 20–32)
CREAT SERPL-MCNC: 1.81 MG/DL (ref 0.66–1.25)
ERYTHROCYTE [DISTWIDTH] IN BLOOD BY AUTOMATED COUNT: 13.7 % (ref 10–15)
GFR SERPL CREATININE-BSD FRML MDRD: 41 ML/MIN/{1.73_M2}
GLUCOSE SERPL-MCNC: 232 MG/DL (ref 70–99)
HCT VFR BLD AUTO: 35.1 % (ref 40–53)
HGB BLD-MCNC: 11.7 G/DL (ref 13.3–17.7)
MAGNESIUM SERPL-MCNC: 2.1 MG/DL (ref 1.6–2.3)
MCH RBC QN AUTO: 32.4 PG (ref 26.5–33)
MCHC RBC AUTO-ENTMCNC: 33.3 G/DL (ref 31.5–36.5)
MCV RBC AUTO: 97 FL (ref 78–100)
PHOSPHATE SERPL-MCNC: 3.5 MG/DL (ref 2.5–4.5)
PLATELET # BLD AUTO: 114 10E9/L (ref 150–450)
POTASSIUM SERPL-SCNC: 5.1 MMOL/L (ref 3.4–5.3)
PROT SERPL-MCNC: 7.3 G/DL (ref 6.8–8.8)
RBC # BLD AUTO: 3.61 10E12/L (ref 4.4–5.9)
SODIUM SERPL-SCNC: 142 MMOL/L (ref 133–144)
TACROLIMUS BLD-MCNC: 7.5 UG/L (ref 5–15)
TME LAST DOSE: NORMAL H
WBC # BLD AUTO: 3.7 10E9/L (ref 4–11)

## 2020-08-12 PROCEDURE — 83735 ASSAY OF MAGNESIUM: CPT | Performed by: SURGERY

## 2020-08-12 PROCEDURE — 99207 ZZC NO CHARGE NURSE ONLY: CPT | Performed by: NURSE PRACTITIONER

## 2020-08-12 PROCEDURE — 36415 COLL VENOUS BLD VENIPUNCTURE: CPT | Performed by: SURGERY

## 2020-08-12 PROCEDURE — 80048 BASIC METABOLIC PNL TOTAL CA: CPT | Performed by: SURGERY

## 2020-08-12 PROCEDURE — 80197 ASSAY OF TACROLIMUS: CPT | Performed by: SURGERY

## 2020-08-12 PROCEDURE — 84100 ASSAY OF PHOSPHORUS: CPT | Performed by: SURGERY

## 2020-08-12 PROCEDURE — 85027 COMPLETE CBC AUTOMATED: CPT | Performed by: SURGERY

## 2020-08-12 PROCEDURE — 80076 HEPATIC FUNCTION PANEL: CPT | Performed by: SURGERY

## 2020-08-12 NOTE — PROGRESS NOTES
Infusion Nursing Note:  Frandy Workman presents today for Aranesp-NOT GIVEN.    Patient seen by provider today: No   present during visit today: Not Applicable.    Note: Informed patient he does not meet parameters to receive Aranesp today as hemoglobin was higher than 11.5.     Completed Lasix for leg swelling. Edema resolved per pt.     Intravenous Access:  No Intravenous access/labs at this visit.    Treatment Conditions:  Lab Results   Component Value Date    HGB 11.7 08/12/2020     Lab Results   Component Value Date    WBC 3.7 08/12/2020      Lab Results   Component Value Date    ANEU 2.1 07/29/2020     Lab Results   Component Value Date     08/12/2020      Results reviewed, labs did NOT meet treatment parameters: Give if Hgb <11.5.    Hold dose if systolic blood pressure >180 mm Hg.    Do NOT give if patient is receiving dialysis. Do NOT give if patient is receiving intravenous iron in the same day.        Discharge Plan:   Patient prompted to schedule next appointment in 2 weeks.  He stated he will be leaving for [place] on 8/21 and will be gone 3-4 weeks. He wants to check his hgb next week before he leaves and get a shot of Aranesp if needed to cover himself before his trip.   Patient discharged in stable condition accompanied by: self.  Departure Mode: Ambulatory.    Ekaterina Balbuena RN                         Patient presented after waiting 30 minutes with no reaction to  injections. Discharged from clinic.  Haider Cook RN

## 2020-08-12 NOTE — TELEPHONE ENCOUNTER
Anemia Management Note  SUBJECTIVE/OBJECTIVE:  Referred by Dr. Eloy Rao on 3/2/2020  Primary Diagnosis: Anemia in Chronic Kidney Disease (N18.4, D63.1)     Secondary Diagnosis:  Chronic Kidney Disease, Stage 4 (N18.4)                 Liver Transplant: 2019  Hgb goal range:  10-11.5 per Dr. Rao's referral  Epo/Darbo: Aranesp 40mcg every 14 days for Hgb <11.5.  In Clinic.  Iron regimen:  Ferrous Sulfate 325mg once daily - was taking three times daily, decreased in   Labs : Hemoglobin - has standing order for CBC/platelets twice weekly - expires 2020  Iron Labs: 3/3/2021  Recent IVELISSE use, transfusion, IV iron: PRBC on 3/3  RX/TX plans : TBD  No history of stroke, MI and blood clots.  History of hepatocellular carcinoma - s/p TACE 2019 and liver transplant 2019     Contact:            Ok to leave message regarding scheduling, medical, and billing per consent to communicate dated 10/2/19                              OK to speak with Davi Saunders (friend/POA) regarding scheduling, medical, and billing per consent to communicate dated 10/2/19    Anemia Latest Ref Rng & Units 2020 2020 2020 7/15/2020 2020 2020 2020   IVELISSE Dose - - 40mcg - - - - -   Hemoglobin 13.3 - 17.7 g/dL 9.0(L) - 7.9(L) 9.9(L) 10.6(L) 11.7(L) 11.7(L)   TSAT 15 - 46 % - - - - 38 - -   Ferritin 26 - 388 ng/mL - - - - 193 - -   PRBCs - - - - - - - -     BP Readings from Last 3 Encounters:   20 137/76   20 (!) 149/74   20 (!) 145/82     Wt Readings from Last 2 Encounters:   20 76.8 kg (169 lb 4.8 oz)   20 75.6 kg (166 lb 9.6 oz)           ASSESSMENT:  Hgb:Above goal - recommend hold dose  TSat: at goal >30% Ferritin: At goal (>100ng/mL)    PLAN:  Hold Aranesp and RTC for hgb then aranesp if needed in 2 week(s). [facility] does have another appt scheduled in 1 week.     Orders needed to be renewed (for next follow-up date) in [facility]: None    Iron labs due:  2  weeks    Plan discussed with:  No call, lab review  Plan provided by:  adri    NEXT FOLLOW-UP DATE:  8/18/20    Jie Blum RN   Anemia Services  [address] [address]   [email or web address]   Office : [phone]  Fax: [phone]

## 2020-08-12 NOTE — TELEPHONE ENCOUNTER
Call back to Miller. Reviewed w/ Miller.  He says he was put back on lasix due to leg swelling.  He was done w/ the lasix last Thursday. Leg swelling is much better.  He will try to drink more.

## 2020-08-12 NOTE — TELEPHONE ENCOUNTER
Patient Call: Voicemail  Date/Time: 081220 10:30 am  Reason for call: Patient returning call, has a question about creatinine, please call him back at [phone]

## 2020-08-19 ENCOUNTER — INFUSION THERAPY VISIT (OUTPATIENT)
Dept: INFUSION THERAPY | Facility: CLINIC | Age: 55
End: 2020-08-19
Payer: MEDICARE

## 2020-08-19 ENCOUNTER — TELEPHONE (OUTPATIENT)
Dept: PHARMACY | Facility: CLINIC | Age: 55
End: 2020-08-19

## 2020-08-19 VITALS
BODY MASS INDEX: 25.03 KG/M2 | RESPIRATION RATE: 16 BRPM | WEIGHT: 169.5 LBS | OXYGEN SATURATION: 100 % | DIASTOLIC BLOOD PRESSURE: 77 MMHG | SYSTOLIC BLOOD PRESSURE: 151 MMHG | TEMPERATURE: 97.7 F | HEART RATE: 72 BPM

## 2020-08-19 DIAGNOSIS — D63.1 ANEMIA OF CHRONIC RENAL FAILURE, STAGE 4 (SEVERE) (H): ICD-10-CM

## 2020-08-19 DIAGNOSIS — Z13.220 LIPID SCREENING: ICD-10-CM

## 2020-08-19 DIAGNOSIS — N18.4 CKD (CHRONIC KIDNEY DISEASE) STAGE 4, GFR 15-29 ML/MIN (H): Primary | ICD-10-CM

## 2020-08-19 DIAGNOSIS — Z94.4 LIVER REPLACED BY TRANSPLANT (H): ICD-10-CM

## 2020-08-19 DIAGNOSIS — N18.4 ANEMIA OF CHRONIC RENAL FAILURE, STAGE 4 (SEVERE) (H): Primary | ICD-10-CM

## 2020-08-19 DIAGNOSIS — D63.1 ANEMIA OF CHRONIC RENAL FAILURE, STAGE 4 (SEVERE) (H): Primary | ICD-10-CM

## 2020-08-19 DIAGNOSIS — N18.4 ANEMIA OF CHRONIC RENAL FAILURE, STAGE 4 (SEVERE) (H): ICD-10-CM

## 2020-08-19 LAB
ALBUMIN SERPL-MCNC: 3.9 G/DL (ref 3.4–5)
ALP SERPL-CCNC: 110 U/L (ref 40–150)
ALT SERPL W P-5'-P-CCNC: 33 U/L (ref 0–70)
ANION GAP SERPL CALCULATED.3IONS-SCNC: 3 MMOL/L (ref 3–14)
AST SERPL W P-5'-P-CCNC: 18 U/L (ref 0–45)
BILIRUB DIRECT SERPL-MCNC: 0.1 MG/DL (ref 0–0.2)
BILIRUB SERPL-MCNC: 0.4 MG/DL (ref 0.2–1.3)
BUN SERPL-MCNC: 34 MG/DL (ref 7–30)
CALCIUM SERPL-MCNC: 8.8 MG/DL (ref 8.5–10.1)
CHLORIDE SERPL-SCNC: 108 MMOL/L (ref 94–109)
CO2 SERPL-SCNC: 28 MMOL/L (ref 20–32)
CREAT SERPL-MCNC: 1.58 MG/DL (ref 0.66–1.25)
ERYTHROCYTE [DISTWIDTH] IN BLOOD BY AUTOMATED COUNT: 13.6 % (ref 10–15)
GFR SERPL CREATININE-BSD FRML MDRD: 48 ML/MIN/{1.73_M2}
GLUCOSE SERPL-MCNC: 214 MG/DL (ref 70–99)
HCT VFR BLD AUTO: 34.8 % (ref 40–53)
HCT VFR BLD AUTO: NORMAL % (ref 40–53)
HGB BLD-MCNC: 11.7 G/DL (ref 13.3–17.7)
HGB BLD-MCNC: NORMAL G/DL (ref 13.3–17.7)
MAGNESIUM SERPL-MCNC: 1.8 MG/DL (ref 1.6–2.3)
MCH RBC QN AUTO: 32.3 PG (ref 26.5–33)
MCHC RBC AUTO-ENTMCNC: 33.6 G/DL (ref 31.5–36.5)
MCV RBC AUTO: 96 FL (ref 78–100)
PHOSPHATE SERPL-MCNC: 3.5 MG/DL (ref 2.5–4.5)
PLATELET # BLD AUTO: 117 10E9/L (ref 150–450)
POTASSIUM SERPL-SCNC: 5.4 MMOL/L (ref 3.4–5.3)
PROT SERPL-MCNC: 7 G/DL (ref 6.8–8.8)
RBC # BLD AUTO: 3.62 10E12/L (ref 4.4–5.9)
SODIUM SERPL-SCNC: 139 MMOL/L (ref 133–144)
TACROLIMUS BLD-MCNC: 7.5 UG/L (ref 5–15)
TME LAST DOSE: NORMAL H
WBC # BLD AUTO: 4.4 10E9/L (ref 4–11)

## 2020-08-19 PROCEDURE — 84100 ASSAY OF PHOSPHORUS: CPT | Performed by: SURGERY

## 2020-08-19 PROCEDURE — 80076 HEPATIC FUNCTION PANEL: CPT | Performed by: SURGERY

## 2020-08-19 PROCEDURE — 80048 BASIC METABOLIC PNL TOTAL CA: CPT | Performed by: SURGERY

## 2020-08-19 PROCEDURE — 83735 ASSAY OF MAGNESIUM: CPT | Performed by: SURGERY

## 2020-08-19 PROCEDURE — 80197 ASSAY OF TACROLIMUS: CPT | Performed by: SURGERY

## 2020-08-19 PROCEDURE — 99207 ZZC NO CHARGE NURSE ONLY: CPT | Performed by: NURSE PRACTITIONER

## 2020-08-19 PROCEDURE — 36415 COLL VENOUS BLD VENIPUNCTURE: CPT | Performed by: SURGERY

## 2020-08-19 PROCEDURE — 85027 COMPLETE CBC AUTOMATED: CPT | Performed by: SURGERY

## 2020-08-19 NOTE — PROGRESS NOTES
Infusion Nursing Note:  Frandy Workman presents today for Tacrolimus-NOT GIVEN.    Patient seen by provider today: No   present during visit today: Not Applicable.    Note: Patient is leaving to visit family in [place] on Saturday for 3 weeks. Hemoglobin has been stable, patient feeling well.    Called lab to confirm they could draw his open orders from Dr. Ramos. They thought they could add them on. Last took tacrolimus at 730pm yesterday. Last took tacrolimus at 730pm yesterday.    Intravenous Access:  No Intravenous access/labs at this visit.    Treatment Conditions:  Lab Results   Component Value Date    HGB 11.7 08/19/2020     Lab Results   Component Value Date    WBC 3.7 08/12/2020      Lab Results   Component Value Date    ANEU 2.1 07/29/2020     Lab Results   Component Value Date     08/12/2020      Results reviewed, labs did NOT meet treatment parameters: Give if Hgb <11.5.        Discharge Plan:   Patient will return in 3 weeks for next appointment-prompted to schedule.  Patient discharged in stable condition accompanied by: self.  Departure Mode: Ambulatory.    Ekaterina Balbuena RN

## 2020-08-19 NOTE — TELEPHONE ENCOUNTER
Anemia Management Note  SUBJECTIVE/OBJECTIVE:  Referred by Dr. Eloy Rao on 3/2/2020  Primary Diagnosis: Anemia in Chronic Kidney Disease (N18.4, D63.1)     Secondary Diagnosis:  Chronic Kidney Disease, Stage 4 (N18.4)                 Liver Transplant: 2019  Hgb goal range:  10-11.5 per Dr. Rao's referral  Epo/Darbo: Aranesp 40mcg every 14 days for Hgb <11.5.  In Clinic.  Iron regimen:  Ferrous Sulfate 325mg once daily - was taking three times daily, decreased in   Labs : Hemoglobin - has standing order for CBC/platelets twice weekly - expires 2020  Iron Labs: 3/3/2021  Recent IVELISSE use, transfusion, IV iron: PRBC on 3/3  RX/TX plans : TBD  No history of stroke, MI and blood clots.  History of hepatocellular carcinoma - s/p TACE 2019 and liver transplant 2019     Contact:            Ok to leave message regarding scheduling, medical, and billing per consent to communicate dated 10/2/19                              OK to speak with Davi Saunders (friend/POA) regarding scheduling, medical, and billing per consent to communicate dated 10/2/19    Anemia Latest Ref Rng & Units 2020 2020 7/15/2020 2020 2020 2020 2020   IVELISSE Dose - 40mcg - - - - - -   Hemoglobin 13.3 - 17.7 g/dL - 7.9(L) 9.9(L) 10.6(L) 11.7(L) 11.7(L) 11.7(L)   TSAT 15 - 46 % - - - 38 - - -   Ferritin 26 - 388 ng/mL - - - 193 - - -   PRBCs - - - - - - - -     BP Readings from Last 3 Encounters:   20 (!) 151/77   20 137/76   20 (!) 149/74     Wt Readings from Last 2 Encounters:   20 76.9 kg (169 lb 8 oz)   20 76.8 kg (169 lb 4.8 oz)           ASSESSMENT:  Hgb:Above goal - recommend hold dose  TSat: at goal >30% Ferritin: At goal (>100ng/mL)    PLAN:  Hold Aranesp and RTC for hgb then aranesp if needed in 2-4 week(s)    Orders needed to be renewed (for next follow-up date) in [facility]: None    Iron labs due:  20    Plan discussed with:  No call, lab  review  Plan provided by:  adri    NEXT FOLLOW-UP DATE:  9/16/20  Appt sched     Jie Blum RN   Anemia Services  [address] [address]   [email or web address]   Office : [phone]  Fax: [phone]

## 2020-08-20 ENCOUNTER — OFFICE VISIT (OUTPATIENT)
Dept: OPHTHALMOLOGY | Facility: CLINIC | Age: 55
End: 2020-08-20
Attending: OPHTHALMOLOGY
Payer: MEDICARE

## 2020-08-20 DIAGNOSIS — E11.3313 TYPE 2 DIABETES MELLITUS WITH MODERATE NONPROLIFERATIVE RETINOPATHY OF BOTH EYES AND MACULAR EDEMA, UNSPECIFIED WHETHER LONG TERM INSULIN USE (H): ICD-10-CM

## 2020-08-20 PROCEDURE — 92134 CPTRZ OPH DX IMG PST SGM RTA: CPT | Mod: ZF | Performed by: OPHTHALMOLOGY

## 2020-08-20 PROCEDURE — G0463 HOSPITAL OUTPT CLINIC VISIT: HCPCS | Mod: ZF

## 2020-08-20 RX ORDER — ARIPIPRAZOLE 5 MG/1
TABLET ORAL DAILY
COMMUNITY
Start: 2020-08-14 | End: 2022-12-27

## 2020-08-20 ASSESSMENT — VISUAL ACUITY
OS_CC+: -3
METHOD: SNELLEN - LINEAR
OS_CC: 20/25
OD_CC: 20/25

## 2020-08-20 ASSESSMENT — REFRACTION_WEARINGRX
OD_CYLINDER: +0.75
OD_SPHERE: -7.00
OS_AXIS: 044
SPECS_TYPE: PAL
OD_ADD: +2.00
OS_ADD: +2.00
OS_CYLINDER: +0.50
OS_SPHERE: -6.75
OD_AXIS: 131

## 2020-08-20 ASSESSMENT — SLIT LAMP EXAM - LIDS
COMMENTS: NORMAL
COMMENTS: SMALL PAPILLOMA ALONG LL MARGIN

## 2020-08-20 ASSESSMENT — EXTERNAL EXAM - LEFT EYE: OS_EXAM: NORMAL

## 2020-08-20 ASSESSMENT — CONF VISUAL FIELD
METHOD: COUNTING FINGERS
OS_NORMAL: 1

## 2020-08-20 ASSESSMENT — CUP TO DISC RATIO
OD_RATIO: 0.25
OS_RATIO: 0.3

## 2020-08-20 ASSESSMENT — TONOMETRY
OD_IOP_MMHG: 15
OS_IOP_MMHG: 12
IOP_METHOD: TONOPEN

## 2020-08-20 ASSESSMENT — EXTERNAL EXAM - RIGHT EYE: OD_EXAM: NORMAL

## 2020-08-20 NOTE — PROGRESS NOTES
CC:  Follow up Diabetic macular edema  left eye     HPI: Frandy Workman is a  55 year old year-old patient with history of Diabetes mellitus for 24 ys . Patient on insulin.  Patient was evaluated by dr. Amezquita and sent to the retina clinic for management of Diabetic macular edema     Interval History: Patient will be traveling for the next couple of months. last eye examination 3/2020 and received Eylea right eye. Vision is stable if not a little better compared to then. Glasses have been updated and he is happy with his vision.     Lab Results   Component Value Date    A1C 6.3 06/13/2020    A1C 6.6 08/08/2018    A1C 6.5 06/09/2017    A1C 7.8 10/25/2016       Retinal Imaging:  OCT 8-20-20  RE: mild Diabetic macular edema, improved c/t 3/2020  LE: mild Diabetic macular edema, mild Epiretinal membrane, stable PAMM inferotemporal to fovea    fluorescein angiography transits right eye 9-5-19  Right eye: diffuse microaneurysms, late mild macular leakage; mild peripheral capillary non perfusion;  mild vessel leakage in late phase, no NVD/NVE  Left eye: diffuse microaneurysms, late mild macula leakage; mild peripheral capillary non perfusion;  mild vessel leakage in late phase, no NVD/NVE    Optos consistent with clinical exam    Manifest Refraction      Sphere Cylinder [place] Dist VA Add Near VA   Right -7.25 +0.75 125 20/30 +2.50 J2   Left -7.00 +0.50 045 20/25-2 +2.50 J1   Pt requests MRx today.       Assessment & Plan:    0. Status post liver transplant   - tx 11/2020   - severe anemia; s/p transfusion - anemia much improved    - being seen by his primary team    1.  Moderate nonproliferative diabetic retinopathy of both eyes    - HbA1c 6.3% (6/2020)   - exam stable with No NV on exam each eye    - last FA 9/5/19 improved leakage on fluorescein angiography compared to march, 2019   - Blood pressure (<120/80) and blood glucose (HbA1c <7.0, ~6.5 today) control discussed with patient. Patient advised that failure to  adequately control each may lead to vision loss. The patient expressed understanding.     2. Diabetic macular edema both eyes    - status post avastin x 4. Switch to Eylea 4/24/19 with improvement of Diabetic macular edema    - now with resolution of DME   - Last Eylea injection was 3/2020    - Optical Coherence Tomography with improved Diabetic macular edema both eyes    - observe both eye for now         3. Myopia, bilateral  Prescription given today (10/9/19)     4. Senile nuclear sclerosis, bilateral  Comment: Not visually significant OU    5. Dry eye syndrome   Left eye worse than right eye   warm compresses and artificial tears  As needed  -punctal plugs previously left eye - reports this is better     Plan: follow up 8 weeks for Optical Coherence Tomography both eyes and possible Eylea each eye      Dieter Reid MD  Resident    ~~~~~~~~~~~~~~~~~~~~~~~~~~~~~~~~~~   Complete documentation of historical and exam elements from today's encounter can be found in the full encounter summary report (not reduplicated in this progress note).  I personally obtained the chief complaint(s) and history of present illness.  I confirmed and edited as necessary the review of systems, past medical/surgical history, family history, social history, and examination findings as documented by others; and I examined the patient myself.  I personally reviewed the relevant tests, images, and reports as documented above.  I personally reviewed the ophthalmic test(s) associated with this encounter, agree with the interpretation(s) as documented by the resident/fellow, and have edited the corresponding report(s) as necessary.   I formulated and edited as necessary the assessment and plan and discussed the findings and management plan with the patient and family    Enriqueta Martin MD   of Ophthalmology.  Retina Service   Department of Ophthalmology and Visual Neurosciences   [facility]  Phone: [phone]   Fax: [phone]

## 2020-08-20 NOTE — NURSING NOTE
Chief Complaints and History of Present Illnesses   Patient presents with     Diabetic Eye Exam Follow Up     Chief Complaint(s) and History of Present Illness(es)     Diabetic Eye Exam Follow Up     Associated symptoms: itching and tearing.  Negative for floaters and flashes    Diabetes Type: Type 2    Blood Sugars: is controlled    Pain scale: 0/10              Comments     Miller is here to continue care for Type 2 diabetes mellitus with moderate nonproliferative retinopathy of both eyes and macular edema, unspecified whether long term insulin use (H). He has new glasses and feels he sees better than last visit.     Last A1c was 6.1 in March  BS today was 189      Brian Flower COT 8:32 AM August 20, 2020

## 2020-08-27 NOTE — TELEPHONE ENCOUNTER
EMS/ Pt just received fluconazole in the mail. Patient will decrease his tacro to 2 mg every 12 hours during the treatment of candida. Patient knows once it finishes to go back to his normal dose. Patient wrote it down and repeated plan.

## 2020-09-16 ENCOUNTER — TELEPHONE (OUTPATIENT)
Dept: TRANSPLANT | Facility: CLINIC | Age: 55
End: 2020-09-16

## 2020-09-16 ENCOUNTER — TELEPHONE (OUTPATIENT)
Dept: PHARMACY | Facility: CLINIC | Age: 55
End: 2020-09-16

## 2020-09-16 ENCOUNTER — INFUSION THERAPY VISIT (OUTPATIENT)
Dept: INFUSION THERAPY | Facility: CLINIC | Age: 55
End: 2020-09-16
Payer: MEDICARE

## 2020-09-16 VITALS
SYSTOLIC BLOOD PRESSURE: 143 MMHG | DIASTOLIC BLOOD PRESSURE: 78 MMHG | OXYGEN SATURATION: 100 % | WEIGHT: 171 LBS | HEART RATE: 78 BPM | BODY MASS INDEX: 25.25 KG/M2

## 2020-09-16 DIAGNOSIS — Z13.220 LIPID SCREENING: ICD-10-CM

## 2020-09-16 DIAGNOSIS — D63.1 ANEMIA DUE TO STAGE 4 CHRONIC KIDNEY DISEASE (H): ICD-10-CM

## 2020-09-16 DIAGNOSIS — N18.4 ANEMIA DUE TO STAGE 4 CHRONIC KIDNEY DISEASE (H): ICD-10-CM

## 2020-09-16 DIAGNOSIS — N18.4 ANEMIA OF CHRONIC RENAL FAILURE, STAGE 4 (SEVERE) (H): ICD-10-CM

## 2020-09-16 DIAGNOSIS — D63.1 ANEMIA OF CHRONIC RENAL FAILURE, STAGE 4 (SEVERE) (H): Primary | ICD-10-CM

## 2020-09-16 DIAGNOSIS — Z94.4 LIVER REPLACED BY TRANSPLANT (H): ICD-10-CM

## 2020-09-16 DIAGNOSIS — D63.1 ANEMIA OF CHRONIC RENAL FAILURE, STAGE 4 (SEVERE) (H): ICD-10-CM

## 2020-09-16 DIAGNOSIS — N18.4 ANEMIA OF CHRONIC RENAL FAILURE, STAGE 4 (SEVERE) (H): Primary | ICD-10-CM

## 2020-09-16 DIAGNOSIS — N18.4 CKD (CHRONIC KIDNEY DISEASE) STAGE 4, GFR 15-29 ML/MIN (H): Primary | ICD-10-CM

## 2020-09-16 LAB
ALBUMIN SERPL-MCNC: 3.9 G/DL (ref 3.4–5)
ALP SERPL-CCNC: 132 U/L (ref 40–150)
ALT SERPL W P-5'-P-CCNC: 27 U/L (ref 0–70)
ANION GAP SERPL CALCULATED.3IONS-SCNC: 4 MMOL/L (ref 3–14)
AST SERPL W P-5'-P-CCNC: 10 U/L (ref 0–45)
BILIRUB DIRECT SERPL-MCNC: 0.1 MG/DL (ref 0–0.2)
BILIRUB SERPL-MCNC: 0.5 MG/DL (ref 0.2–1.3)
BUN SERPL-MCNC: 39 MG/DL (ref 7–30)
CALCIUM SERPL-MCNC: 9.1 MG/DL (ref 8.5–10.1)
CHLORIDE SERPL-SCNC: 108 MMOL/L (ref 94–109)
CO2 SERPL-SCNC: 27 MMOL/L (ref 20–32)
CREAT SERPL-MCNC: 1.6 MG/DL (ref 0.66–1.25)
ERYTHROCYTE [DISTWIDTH] IN BLOOD BY AUTOMATED COUNT: 13.7 % (ref 10–15)
FERRITIN SERPL-MCNC: 223 NG/ML (ref 26–388)
GFR SERPL CREATININE-BSD FRML MDRD: 47 ML/MIN/{1.73_M2}
GLUCOSE SERPL-MCNC: 252 MG/DL (ref 70–99)
HCT VFR BLD AUTO: 34.7 % (ref 40–53)
HGB BLD-MCNC: 11.8 G/DL (ref 13.3–17.7)
IRON SATN MFR SERPL: 42 % (ref 15–46)
IRON SERPL-MCNC: 93 UG/DL (ref 35–180)
MAGNESIUM SERPL-MCNC: 1.8 MG/DL (ref 1.6–2.3)
MCH RBC QN AUTO: 31.7 PG (ref 26.5–33)
MCHC RBC AUTO-ENTMCNC: 34 G/DL (ref 31.5–36.5)
MCV RBC AUTO: 93 FL (ref 78–100)
PHOSPHATE SERPL-MCNC: 3.9 MG/DL (ref 2.5–4.5)
PLATELET # BLD AUTO: 114 10E9/L (ref 150–450)
POTASSIUM SERPL-SCNC: 4.9 MMOL/L (ref 3.4–5.3)
PROT SERPL-MCNC: 8 G/DL (ref 6.8–8.8)
RBC # BLD AUTO: 3.72 10E12/L (ref 4.4–5.9)
SODIUM SERPL-SCNC: 139 MMOL/L (ref 133–144)
TACROLIMUS BLD-MCNC: 7.3 UG/L (ref 5–15)
TIBC SERPL-MCNC: 221 UG/DL (ref 240–430)
TME LAST DOSE: NORMAL H
WBC # BLD AUTO: 4 10E9/L (ref 4–11)

## 2020-09-16 PROCEDURE — 83550 IRON BINDING TEST: CPT | Performed by: INTERNAL MEDICINE

## 2020-09-16 PROCEDURE — 36415 COLL VENOUS BLD VENIPUNCTURE: CPT | Performed by: INTERNAL MEDICINE

## 2020-09-16 PROCEDURE — 80076 HEPATIC FUNCTION PANEL: CPT | Performed by: SURGERY

## 2020-09-16 PROCEDURE — 80048 BASIC METABOLIC PNL TOTAL CA: CPT | Performed by: SURGERY

## 2020-09-16 PROCEDURE — 82728 ASSAY OF FERRITIN: CPT | Performed by: INTERNAL MEDICINE

## 2020-09-16 PROCEDURE — 99207 ZZC NO CHARGE NURSE ONLY: CPT

## 2020-09-16 PROCEDURE — 84100 ASSAY OF PHOSPHORUS: CPT | Performed by: SURGERY

## 2020-09-16 PROCEDURE — 80197 ASSAY OF TACROLIMUS: CPT | Performed by: SURGERY

## 2020-09-16 PROCEDURE — 85027 COMPLETE CBC AUTOMATED: CPT | Performed by: SURGERY

## 2020-09-16 PROCEDURE — 83540 ASSAY OF IRON: CPT | Performed by: INTERNAL MEDICINE

## 2020-09-16 PROCEDURE — 83735 ASSAY OF MAGNESIUM: CPT | Performed by: SURGERY

## 2020-09-16 NOTE — TELEPHONE ENCOUNTER
Transplant Social Work Services Phone Call      Data: Miller is s/p Liver Transplant from 11/11/19.  Intervention: I attempted to reach Miller for psychosocial follow up but was unable.  I left him a voice message to call me.  Assessment: Miller has had a difficult recovery post liver transplant and his mental health has been a concern.  He required an inpatient hospitalization for Bipolar disorder type 1, manic, severe with psychosis from 6/11/20-6/25/20.  He was referred to [facility] on 6/29/20.  He appears to be attending to his medical appointments, labs, etc.  Education provided by SW: deferred  Plan: I am awaiting a call back from patient to assess, amd provide support and resources as needed.        ALEXYS Mcnair, [facility]  Liver Transplant   Phone [phone]  Pager [phone]

## 2020-09-16 NOTE — PROGRESS NOTES
Infusion Nursing Note:  Frandy Workman presents today for   Chief Complaint   Patient presents with     Allied Health Visit     Aranesp     Patient seen by provider today: No   present during visit today: Not Applicable.    Note: Patient did not need Aranesp today.    Intravenous Access:  No Intravenous access/labs at this visit.    Treatment Conditions:  Lab Results   Component Value Date    HGB 11.8 09/16/2020     Lab Results   Component Value Date    WBC 4.0 09/16/2020      Lab Results   Component Value Date    ANEU 2.1 07/29/2020     Lab Results   Component Value Date     09/16/2020      Results reviewed, labs did NOT meet treatment parameters: Hgb 11.8.      Post Infusion Assessment:  Not Applicable.       Discharge Plan:   Patient will call for next appointment.   Patient discharged in stable condition accompanied by: princess Og CMA

## 2020-09-16 NOTE — TELEPHONE ENCOUNTER
Anemia Management Note  SUBJECTIVE/OBJECTIVE:  Referred by Dr. Eloy Rao on 3/2/2020  Primary Diagnosis: Anemia in Chronic Kidney Disease (N18.4, D63.1)     Secondary Diagnosis:  Chronic Kidney Disease, Stage 4 (N18.4)                 Liver Transplant: 2019  Hgb goal range:  10-11.5 per Dr. Rao's referral  Epo/Darbo: Aranesp 40mcg every 14 days for Hgb <11.5.  In Clinic.  Iron regimen:  Ferrous Sulfate 325mg once daily - was taking three times daily, decreased in   Labs : Hemoglobin - has standing order for CBC/platelets twice weekly - expires 2020  Iron Labs: 3/3/2021  Recent IVELISSE use, transfusion, IV iron: PRBC on 3/3  RX/TX plans : TBD  No history of stroke, MI and blood clots.  History of hepatocellular carcinoma - s/p TACE 2019 and liver transplant 2019     Contact:            Ok to leave message regarding scheduling, medical, and billing per consent to communicate dated 10/2/19                              OK to speak with Davi Saunders (friend/POA) regarding scheduling, medical, and billing per consent to communicate dated 10/2/19    Anemia Latest Ref Rng & Units 7/15/2020 2020 2020 2020 2020 2020 2020   IVELISSE Dose - - - - - - - -   Hemoglobin 13.3 - 17.7 g/dL 9.9(L) 10.6(L) 11.7(L) 11.7(L) Canceled, Test credited 11.7(L) 11.8(L)   TSAT 15 - 46 % - 38 - - - - 42   Ferritin 26 - 388 ng/mL - 193 - - - - 223   PRBCs - - - - - - - -     BP Readings from Last 3 Encounters:   20 (!) 143/78   20 (!) 151/77   20 137/76     Wt Readings from Last 2 Encounters:   20 77.6 kg (171 lb)   20 76.9 kg (169 lb 8 oz)           ASSESSMENT:  Hgb:Above goal - recommend hold dose  TSat: at goal >30% Ferritin: At goal (>100ng/mL)    PLAN:  Hold Aranesp and RTC for hgb then aranesp if needed in 2-4 week(s)    Orders needed to be renewed (for next follow-up date) in EPIC: None    Iron labs due:  4 weeks    Plan discussed with:  No  call, Lab review  Plan provided by:  adri    NEXT FOLLOW-UP DATE:  10/14/2020    Jie Blum RN   Anemia Services  [address] [address]   [email or web address]   Office : [phone]  Fax: [phone]

## 2020-09-17 NOTE — TELEPHONE ENCOUNTER
"Addendum on 9/16/20: I received call back from Miller.  He reports he has been following with a psychiatrist at [facility] and has had two virtual appointments with his provider.  His next appointment is 9/21/20.  Miller is currently prescribed abilify, and he reports being off zyprexa.  Miller is interested in reconnecting with Dr. Murillo for individual therapy, and I will reach out to this provider to request an appointment for patient.  Miller's though process was fairly normal and his mood was good today. Overall, there is significant improvement and stability noted in Miller's mental health today.  He does continue to report having a \"disagreement\" regarding his diagnosis of Bipolar, though he is following the recommendations of his psychiatry provider.     I encouraged Miller to consider attending virtual liver transplant support group and I emailed him instructions on how to participate.      I will remain available for Miller's psychosocial needs, and I encouraged Miller to reach out as needed for support and resources.    ALEXYS Mcnair, [facility]  Liver Transplant   Phone [phone]  Pager [phone]   "

## 2020-09-22 ENCOUNTER — VIRTUAL VISIT (OUTPATIENT)
Dept: ENDOCRINOLOGY | Facility: CLINIC | Age: 55
End: 2020-09-22
Payer: MEDICARE

## 2020-09-22 DIAGNOSIS — E11.22 TYPE 2 DIABETES MELLITUS WITH STAGE 3 CHRONIC KIDNEY DISEASE, WITH LONG-TERM CURRENT USE OF INSULIN (H): Primary | ICD-10-CM

## 2020-09-22 DIAGNOSIS — N18.30 TYPE 2 DIABETES MELLITUS WITH STAGE 3 CHRONIC KIDNEY DISEASE, WITH LONG-TERM CURRENT USE OF INSULIN (H): Primary | ICD-10-CM

## 2020-09-22 DIAGNOSIS — Z79.4 TYPE 2 DIABETES MELLITUS WITH STAGE 3 CHRONIC KIDNEY DISEASE, WITH LONG-TERM CURRENT USE OF INSULIN (H): Primary | ICD-10-CM

## 2020-09-22 NOTE — PATIENT INSTRUCTIONS
Dear Miller,    Please increase Tresiba to 25 units daily.  Please take 1unit Novolo mg /dl >150 (i.e. 150 - 199 1 unit, 200 - 249 2 units, 250 - 299 3 units, etc...)  Do this before meals with carbohydrate coverage.    Example:   with 40 g carb Take 3 units correction + 2 units carbohydrate coverage (40/20 = 2)    If this insufficient we can talk about giving more meal coverage (i.e. 1 unit : 15 g carb) or addding a medication called Empagliflozin.    Keep up your good work checking your Bg and entering your doses in your Benjamin device.  It is very helpful.    At night, try to drink water or exercise and not eat.  The resources below may be helpful:      It is good to speak with you today!    Labs, and clinics are beginning to carefully open up for services.  You do need to call ahead to learn the procedures to get your flu shot, eye exam and labs, but please do consider bringing all of these recommend cares up to date in the coming months.  Please contact us to schedule at any of our [place] lab locations  Call [phone] ([phone]), select option 1.    Our clinic is also slowly beginning to reopen for patients who feel they need to be seen in person and I encourage you to schedule with myself or your primary care provider for a foot exam together with any other needed in person care when you feel comfortable doing so.  Our diabetic foot providers, Dr. Gonzalez and Kamille are also available on a limited basis to see patients with pedal ulcers or other concerns.       Please work to meet recommendations for physical activity which is 30 minutes aerobic activity at 6 days each week and resistance training 3 times /week.  The following resources might be helpful for you, particularly as the weather turns colder.  Both have free unlimited classes online for every age and ability.      [email or web address]: online travellers [email or web address]/  [email or web address]/    Also many heart healthy food ideas are available  at:  https://www.diabetesfoodhub.org/    Please let us know any time you would like to schedule with nutrition or diabetes educator; most insurers cover 2 - 6 hours of education annually and I see better outcomes in patients who take advantage of this.  Knowledge is power of course!    My best wishes,    Tish Toscano PA-C, MPAS  [facility]  Diabetes, Endocrinology, and Metabolism  [phone] Appointments/Nurse  [phone] Fax  [phone] nurse line  [phone] URGENTafter hours/weekend Endocrinologist on call

## 2020-09-22 NOTE — LETTER
"9/22/2020       RE: Frandy Workman  [address] [facility] [address]     Dear Colleague,    Thank you for referring your patient, Frandy Workman, to the [facility] ENDOCRINOLOGY at [facility]. Please see a copy of my visit note below.    Patients Glucose Data was Patient is sharing data via clinic device website and patient has been instructed to update data on website. Find login information by using .[email or web address]     Frandy Workman is a 55 year old male who is being evaluated via a billable video visit.      The patient has been notified of following:     \"This video visit will be conducted via a call between you and your physician/provider. We have found that certain health care needs can be provided without the need for an in-person physical exam.  This service lets us provide the care you need with a video conversation.  If a prescription is necessary we can send it directly to your pharmacy.  If lab work is needed we can place an order for that and you can then stop by our lab to have the test done at a later time.    Video visits are billed at different rates depending on your insurance coverage.  Please reach out to your insurance provider with any questions.    If during the course of the call the physician/provider feels a video visit is not appropriate, you will not be charged for this service.\"    Patient has given verbal consent for Video visit? Yes  How would you like to obtain your AVS? MyChart  If you are dropped from the video visit, the video invite should be resent to: Mychart   Will anyone else be joining your video visit? No        Video-Visit Details    Type of service:  Video Visit    Video Start Time: 1:00 PM  Video End Time: 1:31 PM    Originating Location (pt. Location): Home    Distant Location (provider location):  [facility] ENDOCRINOLOGY     Platform used for Video Visit: [facility]  Endocrinology  Tish Toscano   September 22, " 2020      Chief Complaint:   Diabetes    History of Present Illness:   Frandy Workman is a 55 year old male with a history of with a history of liver cirrhosis secondary to ESTRADA, HCC dx in 2018 s/p liver transplant in 11/2019 which was complicated by hematoma evacuation on POD 1, stage III CKD, CAD, HTN, HLD and diabetes mellitus type 2 with retinopathy who presents for follow-up.     When I last saw him in clinic in February he was doing well with insulin therapy only using his Fidelia Accu-Chek meter to manage basal and bolus regimen with limited hypoglycemia.  In hopes of addressing his limited hypoglycemia we did make a couple of changes.   I did update settings on expert meter.  Corrections were adjusted from 1 unit per 30 mg/dL to  1 unit per 50 mg/dL as he was always subtracting units for what the meter was recommending.    -Novolog  1 unit : 8 grams carb  -Novolog correction scale (check BG 4 times daily). Prior to meal: 1 unit per 50 mg/dL >140. Only taking Novolog at bedtime if >190 mg/dL.  Target 100-130 during the day.   -Basaglar 28 units daily     I last spoke with Miller in June and we switched his long acting insulin to Tresiba.  I recommended a reduction to 26 units daily.      I received the following message from Miller:    I have been experiencing high blood sugars over the last weeks (the switch medication on my sleeping and sugars have jumped.  Avg around 280+, weight has increased to 171 from 160.  Insulin dosage LT is 22 and coverage is carbs / 20.     Diet has been ok but really hungry at night.     He started Abilify 26 days ago.   He switched from evening to morning dosing yesterday.  Feels tired, lethargic, hungry, sleeping more since starting the medication.      Weight is up to 172 lbs..         BG data:            Diabetes monitoring and complications:  CAD: Yes  Last eye exam results: 01/29/2020; stable with no evidence of retinopathy.   Microalbuminuria: 20 in 12/2019   Neuropathy: Yes;  alpha-lipoic acid 600 mg daily   HTN: Yes; carvedilol 12.5 mg BID   On Statin: Yes; rosuvastatin 5 mg daily   On Aspirin: Yes   Depression: Yes; Zoloft 50 mg daily   Erectile dysfunction: Yes  Review of Systems:   Pertinent items are noted in HPI.  All other systems are negative.    Active Medications:   Current Outpatient Medications   Medication     Acetaminophen (TYLENOL) 325 MG CAPS     ARIPiprazole (ABILIFY) 5 MG tablet     Artificial Tear Solution (SM ARTIFICIAL TEARS) SOLN     aspirin 81 MG EC tablet     BD VIKTORIA U/F 32G X 4 MM insulin pen needle     ciclopirox (LOPROX) 0.77 % cream     Continuous Blood Gluc  (FREESTYLE CLAUDIA 14 DAY READER) CECILIO     Continuous Blood Gluc Sensor (FREESTYLE CLAUDIA 14 DAY SENSOR) MISC     ferrous sulfate (FEROSUL) 325 (65 Fe) MG tablet     glucose (BD GLUCOSE) 4 g chewable tablet     insulin aspart (NOVOLOG PEN) 100 UNIT/ML pen     insulin degludec (TRESIBA FLEXTOUCH) 100 UNIT/ML pen     lamiVUDine (EPIVIR) 100 MG tablet     magnesium oxide (MAG-OX) 400 MG tablet     Multiple Vitamin (THERAVITE PO)     order for DME     pantoprazole (PROTONIX) 40 MG EC tablet     polyethylene glycol (MIRALAX) 17 g packet     rosuvastatin (CRESTOR) 5 MG tablet     tacrolimus (GENERIC EQUIVALENT) 1 MG capsule     tamsulosin (FLOMAX) 0.4 MG capsule     vitamin B-12 (CYANOCOBALAMIN) 1000 MCG tablet     vitamin D3 (CHOLECALCIFEROL) 2000 units (50 mcg) tablet     Current Facility-Administered Medications   Medication     Aflibercept (EYLEA) injection 2 mg     Allergies:   Codeine and Seasonal allergies      Past Medical History:  Anemia   BPH   CAD   HTN   HLD   Depression   Conductive hearing loss   GERD   Hepatocellular carcinoma   Liver cirrhosis secondary to ESTRADA   Immunosuppressed    Malnutrition   Chronic CKD stage III   Portal vein thrombosis   Diabetes mellitus type 2   Retinopathy   Bipolar affective disorder   Esophageal varices   Erectile dysfunction   Anxiety      Past Surgical  "History:  Heart catheterization-02/2019   Gold weight eyelid implant; right-11/2017   Chemo embolization-12/2019   Left knee surgery   Right parotidectomy with radical neck dissection-11/2017   Liver transplant-11/11/2019   Exploratory laparotomy, hematoma evacuation and abdominal washout-11/12/2019     Family History:   Father: colon cancer, pancreatic cancer, prostate cancer, colorectal cancer, macular degeneration, glaucoma, skin cancer   Mother: cancer, diabetes, cerebrovascular disease, thyroid disease, depression   Sister: asthma, thyroid disease, depression   Maternal grandfather: prostate cancer, substance abuse   Maternal grandmother: colorectal cancer, substance abuse   Paternal grandmother: colorectal cancer       Social History:   The patient was alone   Smoking Status: never   Smokeless Tobacco: former; chew (1 tin per week)    Alcohol Use: former; quit in 1996       Physical Exam:   /81   Pulse 87   Ht 1.753 m (5' 9\")   Wt 72.3 kg (159 lb 8 oz)   BMI 23.55 kg/m       Wt Readings from Last 10 Encounters:   03/04/20 72.3 kg (159 lb 8 oz)   03/03/20 71.9 kg (158 lb 9.6 oz)   03/02/20 74.8 kg (164 lb 12.8 oz)   02/11/20 73.3 kg (161 lb 8 oz)   02/06/20 71.9 kg (158 lb 8 oz)   01/27/20 73.1 kg (161 lb 3.2 oz)   01/21/20 72.9 kg (160 lb 11.2 oz)   01/13/20 74.3 kg (163 lb 14.4 oz)   01/09/20 73.9 kg (162 lb 14.4 oz)   12/30/19 75.9 kg (167 lb 6.4 oz)        Constitutional: Pleasant no acute cardiopulmonary distress.   EYES: anicteric, normal extra-ocular movements, no lid lag or retraction.  HEENT: Mouth/Throat: Mucous membrane is moist.   Cardiovascular: RRR, S1, S2 normal.   Pulmonary/Chest: CTAB. No wheezing or rales.   Abdominal: +BS. Non tender to palpation.    Neurological: Alert and oriented.    Extremities: No edema.  Psychological: appropriate mood and affect      Data:  Lab Results   Component Value Date     03/02/2020    POTASSIUM 5.6 (H) 03/02/2020    CHLORIDE 113 (H) 03/02/2020 "    CO2 21 03/02/2020    ANIONGAP 6 03/02/2020     (H) 03/02/2020    BUN 56 (H) 03/02/2020    CR 1.92 (H) 03/02/2020    DEBORAH 9.5 03/02/2020     Lab Results   Component Value Date    GFRESTIMATED 38 (L) 03/02/2020    GFRESTIMATED 38 (L) 03/02/2020    GFRESTIMATED 45 (L) 02/24/2020    GFRESTBLACK 44 (L) 03/02/2020    GFRESTBLACK 44 (L) 03/02/2020    GFRESTBLACK 52 (L) 02/24/2020      Lab Results   Component Value Date    MICROL 72 03/02/2020    UMALCR 70.89 (H) 03/02/2020        Lab Results   Component Value Date    A1C 6.6 (H) 08/08/2018    A1C 6.5 (H) 06/09/2017    A1C 7.8 (H) 10/25/2016    HEMOGLOBINA1 4.8 03/04/2020    HEMOGLOBINA1 5.1 12/02/2019    HEMOGLOBINA1 5.4 08/14/2019    HEMOGLOBINA1 6.1 (A) 02/11/2019    HEMOGLOBINA1 8.1 (A) 04/11/2018     Lab Results   Component Value Date    CHOL 131 02/04/2019    CHOL 133 08/08/2018    TRIG 117 02/04/2019    TRIG 172 (H) 08/08/2018    HDL 26 (L) 02/04/2019    HDL 30 (L) 08/08/2018    LDL 81 02/04/2019    LDL 68 08/08/2018    NHDL 105 02/04/2019    NHDL 103 08/08/2018     GFR Estimate   Date Value Ref Range Status   09/16/2020 47 (L) >60 mL/min/[1.73_m2] Final     Comment:     Non  GFR Calc  Starting 12/18/2018, serum creatinine based estimated GFR (eGFR) will be   calculated using the Chronic Kidney Disease Epidemiology Collaboration   (CKD-EPI) equation.     08/19/2020 48 (L) >60 mL/min/[1.73_m2] Final     Comment:     Non  GFR Calc  Starting 12/18/2018, serum creatinine based estimated GFR (eGFR) will be   calculated using the Chronic Kidney Disease Epidemiology Collaboration   (CKD-EPI) equation.     08/12/2020 41 (L) >60 mL/min/[1.73_m2] Final     Comment:     Non  GFR Calc  Starting 12/18/2018, serum creatinine based estimated GFR (eGFR) will be   calculated using the Chronic Kidney Disease Epidemiology Collaboration   (CKD-EPI) equation.         Assessment:  Type 2 diabetes mellitus with stage 3 chronic kidney  disease, with long-term current use of insulin (HOli Wise is a 55 year old male who has a 25+ year history of type II diabetes which is now managed with insulin only.  He recently started a low dose of Abilify and has become more sedentary with inceased appetite and BG.        Plan:  Please increase Tresiba to 25 units daily.  Please take 1unit Novolo mg /dl >150 (i.e. 150 - 199 1 unit, 200 - 249 2 units, 250 - 299 3 units, etc...)  Do this before meals with carbohydrate coverage.    Example:   with 40 g carb Take 3 units correction + 2 units carbohydrate coverage (40/20 = 2)    If this insufficient we can talk about giving more meal coverage (i.e. 1 unit : 15 g carb) or addding a medication called Empagliflozin.    Keep up your good work checking your Bg and entering your doses in your Benjamin device.  It is very helpful.    At night, try to drink water or exercise and not eat.  The resources below may be helpful:     [email or web address].org/  [email or web address]      Follow-up: 1 week    >50% of 31 minute visit spent in face to face counseling, education and coordination of care related to options for better glycemic control as well as preventing, detecting, and treating hypoglycemia.        Again, thank you for allowing me to participate in the care of your patient.      Sincerely,    Tish Toscano PA-C

## 2020-09-22 NOTE — PROGRESS NOTES
"Patients Glucose Data was Patient is sharing data via clinic device website and patient has been instructed to update data on website. Find login information by using [employer]     Frandy Workman is a 55 year old male who is being evaluated via a billable video visit.      The patient has been notified of following:     \"This video visit will be conducted via a call between you and your physician/provider. We have found that certain health care needs can be provided without the need for an in-person physical exam.  This service lets us provide the care you need with a video conversation.  If a prescription is necessary we can send it directly to your pharmacy.  If lab work is needed we can place an order for that and you can then stop by our lab to have the test done at a later time.    Video visits are billed at different rates depending on your insurance coverage.  Please reach out to your insurance provider with any questions.    If during the course of the call the physician/provider feels a video visit is not appropriate, you will not be charged for this service.\"    Patient has given verbal consent for Video visit? Yes  How would you like to obtain your AVS? MyChart  If you are dropped from the video visit, the video invite should be resent to: Mychart   Will anyone else be joining your video visit? No        Video-Visit Details    Type of service:  Video Visit    Video Start Time: 1:00 PM  Video End Time: 1:31 PM    Originating Location (pt. Location): Home    Distant Location (provider location):  [facility] ENDOCRINOLOGY     Platform used for Video Visit: [facility]  Endocrinology  Tish Toscano   September 22, 2020      Chief Complaint:   Diabetes    History of Present Illness:   Frandy Workman is a 55 year old male with a history of with a history of liver cirrhosis secondary to ESTRADA, HCC dx in 2018 s/p liver transplant in 11/2019 which was complicated by hematoma evacuation on POD 1, stage III " CKD, CAD, HTN, HLD and diabetes mellitus type 2 with retinopathy who presents for follow-up.     When I last saw him in clinic in February he was doing well with insulin therapy only using his Fidelia Accu-Chek meter to manage basal and bolus regimen with limited hypoglycemia.  In hopes of addressing his limited hypoglycemia we did make a couple of changes.   I did update settings on expert meter.  Corrections were adjusted from 1 unit per 30 mg/dL to  1 unit per 50 mg/dL as he was always subtracting units for what the meter was recommending.    -Novolog  1 unit : 8 grams carb  -Novolog correction scale (check BG 4 times daily). Prior to meal: 1 unit per 50 mg/dL >140. Only taking Novolog at bedtime if >190 mg/dL.  Target 100-130 during the day.   -Basaglar 28 units daily     I last spoke with Miller in June and we switched his long acting insulin to Tresiba.  I recommended a reduction to 26 units daily.      I received the following message from Miller:    I have been experiencing high blood sugars over the last weeks (the switch medication on my sleeping and sugars have jumped.  Avg around 280+, weight has increased to 171 from 160.  Insulin dosage LT is 22 and coverage is carbs / 20.     Diet has been ok but really hungry at night.     He started Abilify 26 days ago.   He switched from evening to morning dosing yesterday.  Feels tired, lethargic, hungry, sleeping more since starting the medication.      Weight is up to 172 lbs..         BG data:            Diabetes monitoring and complications:  CAD: Yes  Last eye exam results: 01/29/2020; stable with no evidence of retinopathy.   Microalbuminuria: 20 in 12/2019   Neuropathy: Yes; alpha-lipoic acid 600 mg daily   HTN: Yes; carvedilol 12.5 mg BID   On Statin: Yes; rosuvastatin 5 mg daily   On Aspirin: Yes   Depression: Yes; Zoloft 50 mg daily   Erectile dysfunction: Yes  Review of Systems:   Pertinent items are noted in HPI.  All other systems are negative.    Active  Medications:   Current Outpatient Medications   Medication     Acetaminophen (TYLENOL) 325 MG CAPS     ARIPiprazole (ABILIFY) 5 MG tablet     Artificial Tear Solution (SM ARTIFICIAL TEARS) SOLN     aspirin 81 MG EC tablet     BD VIKTORIA U/F 32G X 4 MM insulin pen needle     ciclopirox (LOPROX) 0.77 % cream     Continuous Blood Gluc  (FREESTYLE CLAUDIA 14 DAY READER) CECILIO     Continuous Blood Gluc Sensor (FREESTYLE CLAUDIA 14 DAY SENSOR) MISC     ferrous sulfate (FEROSUL) 325 (65 Fe) MG tablet     glucose (BD GLUCOSE) 4 g chewable tablet     insulin aspart (NOVOLOG PEN) 100 UNIT/ML pen     insulin degludec (TRESIBA FLEXTOUCH) 100 UNIT/ML pen     lamiVUDine (EPIVIR) 100 MG tablet     magnesium oxide (MAG-OX) 400 MG tablet     Multiple Vitamin (THERAVITE PO)     order for DME     pantoprazole (PROTONIX) 40 MG EC tablet     polyethylene glycol (MIRALAX) 17 g packet     rosuvastatin (CRESTOR) 5 MG tablet     tacrolimus (GENERIC EQUIVALENT) 1 MG capsule     tamsulosin (FLOMAX) 0.4 MG capsule     vitamin B-12 (CYANOCOBALAMIN) 1000 MCG tablet     vitamin D3 (CHOLECALCIFEROL) 2000 units (50 mcg) tablet     Current Facility-Administered Medications   Medication     Aflibercept (EYLEA) injection 2 mg     Allergies:   Codeine and Seasonal allergies      Past Medical History:  Anemia   BPH   CAD   HTN   HLD   Depression   Conductive hearing loss   GERD   Hepatocellular carcinoma   Liver cirrhosis secondary to ESTRADA   Immunosuppressed    Malnutrition   Chronic CKD stage III   Portal vein thrombosis   Diabetes mellitus type 2   Retinopathy   Bipolar affective disorder   Esophageal varices   Erectile dysfunction   Anxiety      Past Surgical History:  Heart catheterization-02/2019   Gold weight eyelid implant; right-11/2017   Chemo embolization-12/2019   Left knee surgery   Right parotidectomy with radical neck dissection-11/2017   Liver transplant-11/11/2019   Exploratory laparotomy, hematoma evacuation and abdominal  "washout-11/12/2019     Family History:   Father: colon cancer, pancreatic cancer, prostate cancer, colorectal cancer, macular degeneration, glaucoma, skin cancer   Mother: cancer, diabetes, cerebrovascular disease, thyroid disease, depression   Sister: asthma, thyroid disease, depression   Maternal grandfather: prostate cancer, substance abuse   Maternal grandmother: colorectal cancer, substance abuse   Paternal grandmother: colorectal cancer       Social History:   The patient was alone   Smoking Status: never   Smokeless Tobacco: former; chew (1 tin per week)    Alcohol Use: former; quit in 1996       Physical Exam:   /81   Pulse 87   Ht 1.753 m (5' 9\")   Wt 72.3 kg (159 lb 8 oz)   BMI 23.55 kg/m       Wt Readings from Last 10 Encounters:   03/04/20 72.3 kg (159 lb 8 oz)   03/03/20 71.9 kg (158 lb 9.6 oz)   03/02/20 74.8 kg (164 lb 12.8 oz)   02/11/20 73.3 kg (161 lb 8 oz)   02/06/20 71.9 kg (158 lb 8 oz)   01/27/20 73.1 kg (161 lb 3.2 oz)   01/21/20 72.9 kg (160 lb 11.2 oz)   01/13/20 74.3 kg (163 lb 14.4 oz)   01/09/20 73.9 kg (162 lb 14.4 oz)   12/30/19 75.9 kg (167 lb 6.4 oz)        Constitutional: Pleasant no acute cardiopulmonary distress.   EYES: anicteric, normal extra-ocular movements, no lid lag or retraction.  HEENT: Mouth/Throat: Mucous membrane is moist.   Cardiovascular: RRR, S1, S2 normal.   Pulmonary/Chest: CTAB. No wheezing or rales.   Abdominal: +BS. Non tender to palpation.    Neurological: Alert and oriented.    Extremities: No edema.  Psychological: appropriate mood and affect      Data:  Lab Results   Component Value Date     03/02/2020    POTASSIUM 5.6 (H) 03/02/2020    CHLORIDE 113 (H) 03/02/2020    CO2 21 03/02/2020    ANIONGAP 6 03/02/2020     (H) 03/02/2020    BUN 56 (H) 03/02/2020    CR 1.92 (H) 03/02/2020    DEBORAH 9.5 03/02/2020     Lab Results   Component Value Date    GFRESTIMATED 38 (L) 03/02/2020    GFRESTIMATED 38 (L) 03/02/2020    GFRESTIMATED 45 (L) " 02/24/2020    GFRESTBLACK 44 (L) 03/02/2020    GFRESTBLACK 44 (L) 03/02/2020    GFRESTBLACK 52 (L) 02/24/2020      Lab Results   Component Value Date    MICROL 72 03/02/2020    UMALCR 70.89 (H) 03/02/2020        Lab Results   Component Value Date    A1C 6.6 (H) 08/08/2018    A1C 6.5 (H) 06/09/2017    A1C 7.8 (H) 10/25/2016    HEMOGLOBINA1 4.8 03/04/2020    HEMOGLOBINA1 5.1 12/02/2019    HEMOGLOBINA1 5.4 08/14/2019    HEMOGLOBINA1 6.1 (A) 02/11/2019    HEMOGLOBINA1 8.1 (A) 04/11/2018     Lab Results   Component Value Date    CHOL 131 02/04/2019    CHOL 133 08/08/2018    TRIG 117 02/04/2019    TRIG 172 (H) 08/08/2018    HDL 26 (L) 02/04/2019    HDL 30 (L) 08/08/2018    LDL 81 02/04/2019    LDL 68 08/08/2018    NHDL 105 02/04/2019    NHDL 103 08/08/2018     GFR Estimate   Date Value Ref Range Status   09/16/2020 47 (L) >60 mL/min/[1.73_m2] Final     Comment:     Non  GFR Calc  Starting 12/18/2018, serum creatinine based estimated GFR (eGFR) will be   calculated using the Chronic Kidney Disease Epidemiology Collaboration   (CKD-EPI) equation.     08/19/2020 48 (L) >60 mL/min/[1.73_m2] Final     Comment:     Non  GFR Calc  Starting 12/18/2018, serum creatinine based estimated GFR (eGFR) will be   calculated using the Chronic Kidney Disease Epidemiology Collaboration   (CKD-EPI) equation.     08/12/2020 41 (L) >60 mL/min/[1.73_m2] Final     Comment:     Non  GFR Calc  Starting 12/18/2018, serum creatinine based estimated GFR (eGFR) will be   calculated using the Chronic Kidney Disease Epidemiology Collaboration   (CKD-EPI) equation.         Assessment:  Type 2 diabetes mellitus with stage 3 chronic kidney disease, with long-term current use of insulin (HOli Wise is a 55 year old male who has a 25+ year history of type II diabetes which is now managed with insulin only.  He recently started a low dose of Abilify and has become more sedentary with inceased appetite and  BG.        Plan:  Please increase Tresiba to 25 units daily.  Please take 1unit Novolo mg /dl >150 (i.e. 150 - 199 1 unit, 200 - 249 2 units, 250 - 299 3 units, etc...)  Do this before meals with carbohydrate coverage.    Example:   with 40 g carb Take 3 units correction + 2 units carbohydrate coverage (40/20 = 2)    If this insufficient we can talk about giving more meal coverage (i.e. 1 unit : 15 g carb) or addding a medication called Empagliflozin.    Keep up your good work checking your Bg and entering your doses in your Benjamin device.  It is very helpful.    At night, try to drink water or exercise and not eat.  The resources below may be helpful:     Https://jnuc715.org/  [email or web address]      Follow-up: 1 week    >50% of 31 minute visit spent in face to face counseling, education and coordination of care related to options for better glycemic control as well as preventing, detecting, and treating hypoglycemia.

## 2020-09-23 ENCOUNTER — ALLIED HEALTH/NURSE VISIT (OUTPATIENT)
Dept: INTERNAL MEDICINE | Facility: CLINIC | Age: 55
End: 2020-09-23
Payer: MEDICARE

## 2020-09-23 DIAGNOSIS — Z23 NEED FOR VACCINATION: Primary | ICD-10-CM

## 2020-09-23 DIAGNOSIS — E11.3293 TYPE 2 DIABETES MELLITUS WITH MILD NONPROLIFERATIVE RETINOPATHY OF BOTH EYES WITHOUT MACULAR EDEMA, UNSPECIFIED WHETHER LONG TERM INSULIN USE (H): ICD-10-CM

## 2020-09-23 RX ORDER — FLASH GLUCOSE SENSOR
KIT MISCELLANEOUS
Qty: 2 EACH | Refills: 11 | Status: SHIPPED | OUTPATIENT
Start: 2020-09-23 | End: 2021-01-26

## 2020-09-23 NOTE — TELEPHONE ENCOUNTER
Need new Rx for Freestyle Benjamin sensors dated after 9/22 visit per Medicare    Thank you!  Desirae Rodriguez CPhT  [facility]

## 2020-09-23 NOTE — NURSING NOTE
Patient received Flu vaccine. Vaccine was given under the supervision of Dr. Moe. See immunization list for administration details. Pt was advised to remain in [facility] lobby for 15 minutes in case of an averse reaction. Given by Valerie Dallas CMA, EMT 9:00 AM 9/23/2020

## 2020-09-25 ENCOUNTER — ANCILLARY PROCEDURE (OUTPATIENT)
Dept: MRI IMAGING | Facility: CLINIC | Age: 55
End: 2020-09-25
Attending: NURSE PRACTITIONER
Payer: MEDICARE

## 2020-09-25 ENCOUNTER — TELEPHONE (OUTPATIENT)
Dept: NEPHROLOGY | Facility: CLINIC | Age: 55
End: 2020-09-25

## 2020-09-25 ENCOUNTER — ANCILLARY PROCEDURE (OUTPATIENT)
Dept: CT IMAGING | Facility: CLINIC | Age: 55
End: 2020-09-25
Attending: NURSE PRACTITIONER
Payer: MEDICARE

## 2020-09-25 DIAGNOSIS — E11.69 TYPE 2 DIABETES MELLITUS WITH OTHER SPECIFIED COMPLICATION, WITH LONG-TERM CURRENT USE OF INSULIN (H): ICD-10-CM

## 2020-09-25 DIAGNOSIS — C22.0 HCC (HEPATOCELLULAR CARCINOMA) (H): ICD-10-CM

## 2020-09-25 DIAGNOSIS — Z94.4 LIVER REPLACED BY TRANSPLANT (H): ICD-10-CM

## 2020-09-25 DIAGNOSIS — Z94.4 LIVER REPLACED BY TRANSPLANT (H): Primary | ICD-10-CM

## 2020-09-25 DIAGNOSIS — Z94.4 STATUS POST LIVER TRANSPLANTATION (H): ICD-10-CM

## 2020-09-25 DIAGNOSIS — Z13.220 LIPID SCREENING: ICD-10-CM

## 2020-09-25 DIAGNOSIS — Z79.4 TYPE 2 DIABETES MELLITUS WITH OTHER SPECIFIED COMPLICATION, WITH LONG-TERM CURRENT USE OF INSULIN (H): ICD-10-CM

## 2020-09-25 LAB
ALBUMIN SERPL-MCNC: 3.7 G/DL (ref 3.4–5)
ALP SERPL-CCNC: 129 U/L (ref 40–150)
ALT SERPL W P-5'-P-CCNC: 34 U/L (ref 0–70)
ANION GAP SERPL CALCULATED.3IONS-SCNC: 7 MMOL/L (ref 3–14)
AST SERPL W P-5'-P-CCNC: 14 U/L (ref 0–45)
BILIRUB DIRECT SERPL-MCNC: 0.2 MG/DL (ref 0–0.2)
BILIRUB SERPL-MCNC: 0.6 MG/DL (ref 0.2–1.3)
BUN SERPL-MCNC: 39 MG/DL (ref 7–30)
CALCIUM SERPL-MCNC: 9 MG/DL (ref 8.5–10.1)
CHLORIDE SERPL-SCNC: 109 MMOL/L (ref 94–109)
CHOLEST SERPL-MCNC: 146 MG/DL
CO2 SERPL-SCNC: 22 MMOL/L (ref 20–32)
CREAT SERPL-MCNC: 1.48 MG/DL (ref 0.66–1.25)
ERYTHROCYTE [DISTWIDTH] IN BLOOD BY AUTOMATED COUNT: 14.1 % (ref 10–15)
GFR SERPL CREATININE-BSD FRML MDRD: 52 ML/MIN/{1.73_M2}
GLUCOSE SERPL-MCNC: 276 MG/DL (ref 70–99)
HCT VFR BLD AUTO: 33.4 % (ref 40–53)
HDLC SERPL-MCNC: 39 MG/DL
HGB BLD-MCNC: 11.8 G/DL (ref 13.3–17.7)
LDLC SERPL CALC-MCNC: 61 MG/DL
MAGNESIUM SERPL-MCNC: 1.6 MG/DL (ref 1.6–2.3)
MCH RBC QN AUTO: 32.6 PG (ref 26.5–33)
MCHC RBC AUTO-ENTMCNC: 35.3 G/DL (ref 31.5–36.5)
MCV RBC AUTO: 92 FL (ref 78–100)
NONHDLC SERPL-MCNC: 107 MG/DL
PHOSPHATE SERPL-MCNC: 3.4 MG/DL (ref 2.5–4.5)
PLATELET # BLD AUTO: 109 10E9/L (ref 150–450)
POTASSIUM SERPL-SCNC: 4.9 MMOL/L (ref 3.4–5.3)
PROT SERPL-MCNC: 7.5 G/DL (ref 6.8–8.8)
RBC # BLD AUTO: 3.62 10E12/L (ref 4.4–5.9)
SODIUM SERPL-SCNC: 137 MMOL/L (ref 133–144)
TACROLIMUS BLD-MCNC: 9.4 UG/L (ref 5–15)
TME LAST DOSE: NORMAL H
TRIGL SERPL-MCNC: 228 MG/DL
WBC # BLD AUTO: 4.2 10E9/L (ref 4–11)

## 2020-09-25 PROCEDURE — 80048 BASIC METABOLIC PNL TOTAL CA: CPT | Performed by: INTERNAL MEDICINE

## 2020-09-25 PROCEDURE — 80076 HEPATIC FUNCTION PANEL: CPT | Performed by: INTERNAL MEDICINE

## 2020-09-25 PROCEDURE — 85027 COMPLETE CBC AUTOMATED: CPT | Performed by: INTERNAL MEDICINE

## 2020-09-25 PROCEDURE — 84100 ASSAY OF PHOSPHORUS: CPT | Performed by: INTERNAL MEDICINE

## 2020-09-25 PROCEDURE — 80197 ASSAY OF TACROLIMUS: CPT | Performed by: SURGERY

## 2020-09-25 PROCEDURE — 83735 ASSAY OF MAGNESIUM: CPT | Performed by: INTERNAL MEDICINE

## 2020-09-25 PROCEDURE — 80061 LIPID PANEL: CPT | Performed by: INTERNAL MEDICINE

## 2020-09-25 RX ORDER — GADOBUTROL 604.72 MG/ML
7.5 INJECTION INTRAVENOUS ONCE
Status: COMPLETED | OUTPATIENT
Start: 2020-09-25 | End: 2020-09-25

## 2020-09-25 RX ORDER — IOPAMIDOL 755 MG/ML
85 INJECTION, SOLUTION INTRAVASCULAR ONCE
Status: COMPLETED | OUTPATIENT
Start: 2020-09-25 | End: 2020-09-25

## 2020-09-25 RX ADMIN — IOPAMIDOL 85 ML: 755 INJECTION, SOLUTION INTRAVASCULAR at 10:06

## 2020-09-25 RX ADMIN — GADOBUTROL 7.5 ML: 604.72 INJECTION INTRAVENOUS at 10:57

## 2020-09-25 ASSESSMENT — PAIN SCALES - GENERAL: PAINLEVEL: MODERATE PAIN (5)

## 2020-09-25 NOTE — PROGRESS NOTES
Chief Complaint   Patient presents with     Blood Draw     vpt blood draw, vitals taken.     Venipuncture labs drawn from left hand    Anne-Marie Singh MA

## 2020-09-25 NOTE — TELEPHONE ENCOUNTER
Received note from transplant coordinator to follow up on Blood pressure concerns. Patient reports that his BP was 130/70 yesterday but for 3 weeks has been 150/80 and he does not know why. He does not report any swelling, no changes to his medications other than Abilify at which he is trying to get a handle on he said. Will send to Dr. Rao and follow up with any recommendations.  Lupe Owens LPN  Nephrology  [phone]

## 2020-09-25 NOTE — DISCHARGE INSTRUCTIONS
MRI Contrast Discharge Instructions    The IV contrast you received today will pass out of your body in your  urine. This will happen in the next 24 hours. You will not feel this process.  Your urine will not change color.    Drink at least 4 extra glasses of water or juice today (unless your doctor  has restricted your fluids). This reduces the stress on your kidneys.  You may take your regular medicines.    If you are on dialysis: It is best to have dialysis today.    If you have a reaction: Most reactions happen right away. If you have  any new symptoms after leaving the hospital (such as hives or swelling),  call your hospital at the correct number below. Or call your family doctor.  If you have breathing distress or wheezing, call 911.    Special instructions: ***    I have read and understand the above information.    Signature:______________________________________ Date:___________    Staff:__________________________________________ Date:___________     Time:__________    [facility] Departments:    ___[place]: [phone]  ___[facility]: [phone]  ___[place]: [phone] ___[facility]: [phone]  ___[facility]: [phone]  ___[facility]: [phone]  ___[phone]  ___[facility]: [phone]  ___[phone]

## 2020-09-28 ENCOUNTER — VIRTUAL VISIT (OUTPATIENT)
Dept: ONCOLOGY | Facility: CLINIC | Age: 55
End: 2020-09-28
Attending: INTERNAL MEDICINE
Payer: MEDICARE

## 2020-09-28 DIAGNOSIS — Z94.4 LIVER TRANSPLANT RECIPIENT (H): ICD-10-CM

## 2020-09-28 DIAGNOSIS — C22.0 HCC (HEPATOCELLULAR CARCINOMA) (H): Primary | ICD-10-CM

## 2020-09-28 PROCEDURE — 40001009 ZZH VIDEO/TELEPHONE VISIT; NO CHARGE

## 2020-09-28 PROCEDURE — 99214 OFFICE O/P EST MOD 30 MIN: CPT | Mod: 95 | Performed by: INTERNAL MEDICINE

## 2020-09-28 NOTE — PROGRESS NOTES
"Patients Glucose Data was Patient is sharing data via clinic device website and patient has been instructed to update data on website. Find login information by using .[employer]      Frandy Workman is a 55 year old male who is being evaluated via a billable video visit.      The patient has been notified of following:     \"This video visit will be conducted via a call between you and your physician/provider. We have found that certain health care needs can be provided without the need for an in-person physical exam.  This service lets us provide the care you need with a video conversation.  If a prescription is necessary we can send it directly to your pharmacy.  If lab work is needed we can place an order for that and you can then stop by our lab to have the test done at a later time.    Video visits are billed at different rates depending on your insurance coverage.  Please reach out to your insurance provider with any questions.    If during the course of the call the physician/provider feels a video visit is not appropriate, you will not be charged for this service.\"    Patient has given verbal consent for Video visit? Yes  How would you like to obtain your AVS? MyChart  If you are dropped from the video visit, the video invite should be resent to: Send to e-mail at: [email or web address]  Will anyone else be joining your video visit? No        Video-Visit Details    Type of service:  Video Visit    Video Start Time: 12:58 PM  Video End Time: 1:24 PM    Originating Location (pt. Location): Home    Distant Location (provider location):  [facility] ENDOCRINOLOGY     Platform used for Video Visit: [facility]  Endocrinology  Tish Toscano   September 29, 2020        Chief Complaint:   Diabetes    History of Present Illness:   Frandy Workman is a 55 year old male with a history of with a history of liver cirrhosis secondary to ESTRADA, HCC dx in 2018 s/p liver transplant in 11/2019 which was complicated by " hematoma evacuation on POD 1, stage III CKD, CAD, HTN, HLD and diabetes mellitus type 2 with retinopathy who presents for follow-up.     When I last spoke to Miller he was worried about high BG after having started Abilify in th last month and becoming more l sedentary, tired, lethargic, hungry, sleeping more since starting the medication with weight increasing to 172 lbs and average BG increased to 231 with high BG overnight.      We agreed to:  Please increase Tresiba to 25 units daily.  Please take 1unit Novolo mg /dl >150 (i.e. 150 - 199 1 unit, 200 - 249 2 units, 250 - 299 3 units, etc...)  Do this before meals with carbohydrate coverage.  And discussed the possibility of starting Empagliflozin.       He is frustrated       BG data:        Avg Bg decreased from 231.  Overnight and fatsing  Bg remain above goal.  BG rising after breakfast and dinner.              Diabetes monitoring and complications:  CAD: Yes  Last eye exam results: 2020; stable with no evidence of retinopathy.   Microalbuminuria: 20 in 2019   Neuropathy: Yes; alpha-lipoic acid 600 mg daily   HTN: Yes; carvedilol 12.5 mg BID   On Statin: Yes; rosuvastatin 5 mg daily   On Aspirin: Yes   Depression: Yes; Zoloft 50 mg daily   Erectile dysfunction: Yes  Review of Systems:   Pertinent items are noted in HPI.  All other systems are negative.    Active Medications:   Current Outpatient Medications   Medication     Acetaminophen (TYLENOL) 325 MG CAPS     ARIPiprazole (ABILIFY) 5 MG tablet     Artificial Tear Solution (SM ARTIFICIAL TEARS) SOLN     aspirin 81 MG EC tablet     BD VIKTORIA U/F 32G X 4 MM insulin pen needle     ciclopirox (LOPROX) 0.77 % cream     Continuous Blood Gluc  (FREESTYLE CLAUDIA 14 DAY READER) CECILIO     Continuous Blood Gluc Sensor (FREESTYLE CLAUDIA 14 DAY SENSOR) MISC     ferrous sulfate (FEROSUL) 325 (65 Fe) MG tablet     glucose (BD GLUCOSE) 4 g chewable tablet     insulin aspart (NOVOLOG PEN) 100 UNIT/ML pen      insulin degludec (TRESIBA FLEXTOUCH) 100 UNIT/ML pen     lamiVUDine (EPIVIR) 100 MG tablet     magnesium oxide (MAG-OX) 400 MG tablet     Multiple Vitamin (THERAVITE PO)     order for DME     pantoprazole (PROTONIX) 40 MG EC tablet     polyethylene glycol (MIRALAX) 17 g packet     rosuvastatin (CRESTOR) 5 MG tablet     tacrolimus (GENERIC EQUIVALENT) 1 MG capsule     tamsulosin (FLOMAX) 0.4 MG capsule     vitamin B-12 (CYANOCOBALAMIN) 1000 MCG tablet     vitamin D3 (CHOLECALCIFEROL) 2000 units (50 mcg) tablet     Current Facility-Administered Medications   Medication     Aflibercept (EYLEA) injection 2 mg     Allergies:   Codeine and Seasonal allergies      Past Medical History:  Anemia   BPH   CAD   HTN   HLD   Depression   Conductive hearing loss   GERD   Hepatocellular carcinoma   Liver cirrhosis secondary to ESTRADA   Immunosuppressed    Malnutrition   Chronic CKD stage III   Portal vein thrombosis   Diabetes mellitus type 2   Retinopathy   Bipolar affective disorder   Esophageal varices   Erectile dysfunction   Anxiety      Past Surgical History:  Heart catheterization-02/2019   Gold weight eyelid implant; right-11/2017   Chemo embolization-12/2019   Left knee surgery   Right parotidectomy with radical neck dissection-11/2017   Liver transplant-11/11/2019   Exploratory laparotomy, hematoma evacuation and abdominal washout-11/12/2019     Family History:   Father: colon cancer, pancreatic cancer, prostate cancer, colorectal cancer, macular degeneration, glaucoma, skin cancer   Mother: cancer, diabetes, cerebrovascular disease, thyroid disease, depression   Sister: asthma, thyroid disease, depression   Maternal grandfather: prostate cancer, substance abuse   Maternal grandmother: colorectal cancer, substance abuse   Paternal grandmother: colorectal cancer       Social History:   The patient was alone   Smoking Status: never   Smokeless Tobacco: former; chew (1 tin per week)    Alcohol Use: former; quit in 1996      "  Physical Exam:   /81   Pulse 87   Ht 1.753 m (5' 9\")   Wt 72.3 kg (159 lb 8 oz)   BMI 23.55 kg/m       Wt Readings from Last 10 Encounters:   03/04/20 72.3 kg (159 lb 8 oz)   03/03/20 71.9 kg (158 lb 9.6 oz)   03/02/20 74.8 kg (164 lb 12.8 oz)   02/11/20 73.3 kg (161 lb 8 oz)   02/06/20 71.9 kg (158 lb 8 oz)   01/27/20 73.1 kg (161 lb 3.2 oz)   01/21/20 72.9 kg (160 lb 11.2 oz)   01/13/20 74.3 kg (163 lb 14.4 oz)   01/09/20 73.9 kg (162 lb 14.4 oz)   12/30/19 75.9 kg (167 lb 6.4 oz)        Constitutional: Pleasant no acute cardiopulmonary distress.   EYES: anicteric, normal extra-ocular movements, no lid lag or retraction.  HEENT: Mouth/Throat: Mucous membrane is moist.   Cardiovascular: RRR, S1, S2 normal.   Pulmonary/Chest: CTAB. No wheezing or rales.   Abdominal: +BS. Non tender to palpation.    Neurological: Alert and oriented.    Extremities: No edema.  Psychological: appropriate mood and affect      Data:  Lab Results   Component Value Date     03/02/2020    POTASSIUM 5.6 (H) 03/02/2020    CHLORIDE 113 (H) 03/02/2020    CO2 21 03/02/2020    ANIONGAP 6 03/02/2020     (H) 03/02/2020    BUN 56 (H) 03/02/2020    CR 1.92 (H) 03/02/2020    DEBORAH 9.5 03/02/2020     Lab Results   Component Value Date    GFRESTIMATED 38 (L) 03/02/2020    GFRESTIMATED 38 (L) 03/02/2020    GFRESTIMATED 45 (L) 02/24/2020    GFRESTBLACK 44 (L) 03/02/2020    GFRESTBLACK 44 (L) 03/02/2020    GFRESTBLACK 52 (L) 02/24/2020      Lab Results   Component Value Date    MICROL 72 03/02/2020    UMALCR 70.89 (H) 03/02/2020        Lab Results   Component Value Date    A1C 6.6 (H) 08/08/2018    A1C 6.5 (H) 06/09/2017    A1C 7.8 (H) 10/25/2016    HEMOGLOBINA1 4.8 03/04/2020    HEMOGLOBINA1 5.1 12/02/2019    HEMOGLOBINA1 5.4 08/14/2019    HEMOGLOBINA1 6.1 (A) 02/11/2019    HEMOGLOBINA1 8.1 (A) 04/11/2018     Lab Results   Component Value Date    CHOL 131 02/04/2019    CHOL 133 08/08/2018    TRIG 117 02/04/2019    TRIG 172 (H) " 2018    HDL 26 (L) 2019    HDL 30 (L) 2018    LDL 81 2019    LDL 68 2018    NHDL 105 2019    NHDL 103 2018     GFR Estimate   Date Value Ref Range Status   2020 52 (L) >60 mL/min/[1.73_m2] Final     Comment:     Non  GFR Calc  Starting 2018, serum creatinine based estimated GFR (eGFR) will be   calculated using the Chronic Kidney Disease Epidemiology Collaboration   (CKD-EPI) equation.     2020 47 (L) >60 mL/min/[1.73_m2] Final     Comment:     Non  GFR Calc  Starting 2018, serum creatinine based estimated GFR (eGFR) will be   calculated using the Chronic Kidney Disease Epidemiology Collaboration   (CKD-EPI) equation.     2020 48 (L) >60 mL/min/[1.73_m2] Final     Comment:     Non  GFR Calc  Starting 2018, serum creatinine based estimated GFR (eGFR) will be   calculated using the Chronic Kidney Disease Epidemiology Collaboration   (CKD-EPI) equation.       Cr 1.48 on 20.      Assessment:  Type 2 diabetes mellitus with stage 3 chronic kidney disease, with long-term current use of insulin (HOli Wise is a 55 year old male who has a 25+ year history of type II diabetes which is now managed with insulin only.  He recently started a low dose of Abilify and has become more sedentary with inceased appetite and BG.  Bg remain high after increasing Tresiba adding sliding scale last week.          Plan:  I recommend:   - Increase Tresiba 27 units /day.   - Increase carbohydrate coverage to 1 unit : 15 g of carbohydrate.   - Please continue take 1unit Novolo mg /dl >150 (i.e. 150 - 199 1 unit, 200 - 249 2 units, 250 - 299 3 units, etc...)      - Please start Empagliflozin 10 mg daily as soon as arrives.  If you have any lows with Empagliflozin/ Jardiance, please go back to just 1 unit:20 g of carbohydrate.      - Take care with Jardiance not to become dehydrated.  If you have a febrile  illness, or or with vomiting or diarrhea, hold the medication and do not resume until rehydrated and feeling well.      - At night, try to drink water or exercise and not eat.  The resources below may be helpful:     [email or web address].org/  [email or web address]        Follow-up: 1 month    >50% of 26 minute visit spent in face to face counseling, education and coordination of care related to options for better glycemic control as well as preventing, detecting, and treating hypoglycemia.

## 2020-09-28 NOTE — PROGRESS NOTES
"Frandy Workman is a 55 year old male who is being evaluated via a billable video visit.      The patient has been notified of following:     \"This video visit will be conducted via a call between you and your physician/provider. We have found that certain health care needs can be provided without the need for an in-person physical exam.  This service lets us provide the care you need with a video conversation.  If a prescription is necessary we can send it directly to your pharmacy.  If lab work is needed we can place an order for that and you can then stop by our lab to have the test done at a later time.    Video visits are billed at different rates depending on your insurance coverage.  Please reach out to your insurance provider with any questions.    If during the course of the call the physician/provider feels a video visit is not appropriate, you will not be charged for this service.\"    Patient has given verbal consent for Video visit? Yes  How would you like to obtain your AVS? Mail a copy     E-Mail Invite to [email or web address].    If you are dropped from the video visit, the video invite should be resent to: Send to e-mail at: [email or web address]  Will anyone else be joining your video visit? No      Vitals - Patient Reported 9/28/2020   Height (Patient Reported) 5' 9\"   Weight (Patient Reported) 174 lb 1.6 oz   BMI (Based on Pt Reported Ht/Wt) 25.71 kg/m2   Systolic (Patient Reported) 133   Diastolic (Patient Reported) 85   Pulse (Patient Reported) 78     5/10 on pain scale.       Alisha Díaz CMA      Video-Visit Details    Type of service:  Video Visit    Video Start Time: 3:00  Video End Time: 3:30    Originating Location (pt. Location): Home    Distant Location (provider location):  [facility]     Platform used for Video Visit: Odette    I am seeing Miller Rollins in follow-up of hepatocellular carcinoma.    He is a 55-year-old gentleman with a history of " hepatocellular carcinoma treated with bridging therapy and then a liver transplant at the end of last year.  His explanted liver showed 2 lesions that were almost completely necrotic without vascular invasion.  He has had a prolonged recovery since his transplant but is getting back to something closer to normal now.  His energy and exercise tolerance have improved and he actually was well enough that he was able to drive out to [place] for a family visit for 3 weeks recently.  He is eating better and finally is starting to gain back some of the weight he lost although that is creating problems now with his diabetes and blood pressure management.  He has no significant pain.  He has no fevers chills or sweats.  He has moved into a new living arrangement and is managing well there.  The remainder of his 10 point review systems is otherwise unremarkable.    GENERAL: Healthy, older than his stated age, alert and no distress  EYES: Eyes grossly normal to inspection.  No discharge or erythema, or obvious scleral/conjunctival abnormalities.  RESP: No audible wheeze, cough, or visible cyanosis.  No visible retractions or increased work of breathing.    SKIN: Visible skin clear. No significant rash, abnormal pigmentation or lesions.  NEURO: Cranial nerves grossly intact.  Mentation and speech appropriate for age.  PSYCH: Mentation appears normal, affect normal/bright, judgement and insight intact, normal speech and appearance well-groomed.    I personally reviewed his imaging studies went over the results of those as well as his lab work.  On his CT scan he has some minor changes of atelectasis or fibrosis.  His small pleural effusion is perhaps slightly larger.  His liver MR shows no evidence of recurrence within the upper abdomen.  His electrolytes are unremarkable.  He has minor renal failure consistent with prior values.  His liver enzymes are unremarkable.  His blood counts are improved with hemoglobin now at  11.8.    Assessment/plan: Hepatocellular carcinoma status post liver transplant, currently without evidence of disease.  We will plan on follow-up in another 6 months with a repeat CT scan of his chest abdomen and pelvis watching for evidence of recurrence.  Reviewed with him symptoms that might suggest recurrence that he should bring to our attention.  He will continue to follow-up with his other caregivers with regards to his diabetes, hypertension and malnutrition.

## 2020-09-28 NOTE — LETTER
"    9/28/2020         RE: Frandy Workman  [address] [address]        Dear Colleague,    Thank you for referring your patient, Frandy Workman, to the [facility]. Please see a copy of my visit note below.    Frandy Workman is a 55 year old male who is being evaluated via a billable video visit.      The patient has been notified of following:     \"This video visit will be conducted via a call between you and your physician/provider. We have found that certain health care needs can be provided without the need for an in-person physical exam.  This service lets us provide the care you need with a video conversation.  If a prescription is necessary we can send it directly to your pharmacy.  If lab work is needed we can place an order for that and you can then stop by our lab to have the test done at a later time.    Video visits are billed at different rates depending on your insurance coverage.  Please reach out to your insurance provider with any questions.    If during the course of the call the physician/provider feels a video visit is not appropriate, you will not be charged for this service.\"    Patient has given verbal consent for Video visit? Yes  How would you like to obtain your AVS? Mail a copy     E-Mail Invite to [email or web address].    If you are dropped from the video visit, the video invite should be resent to: Send to e-mail at: [email or web address]  Will anyone else be joining your video visit? No      Vitals - Patient Reported 9/28/2020   Height (Patient Reported) 5' 9\"   Weight (Patient Reported) 174 lb 1.6 oz   BMI (Based on Pt Reported Ht/Wt) 25.71 kg/m2   Systolic (Patient Reported) 133   Diastolic (Patient Reported) 85   Pulse (Patient Reported) 78     5/10 on pain scale.       Ailsha Díaz CMA      Video-Visit Details    Type of service:  Video Visit    Video Start Time: 3:00  Video End Time: 3:30    Originating Location (pt. " Location): Home    Distant Location (provider location):  [facility]     Platform used for Video Visit: Odette    I am seeing Miller Rollins in follow-up of hepatocellular carcinoma.    He is a 55-year-old gentleman with a history of hepatocellular carcinoma treated with bridging therapy and then a liver transplant at the end of last year.  His explanted liver showed 2 lesions that were almost completely necrotic without vascular invasion.  He has had a prolonged recovery since his transplant but is getting back to something closer to normal now.  His energy and exercise tolerance have improved and he actually was well enough that he was able to drive out to [place] for a family visit for 3 weeks recently.  He is eating better and finally is starting to gain back some of the weight he lost although that is creating problems now with his diabetes and blood pressure management.  He has no significant pain.  He has no fevers chills or sweats.  He has moved into a new living arrangement and is managing well there.  The remainder of his 10 point review systems is otherwise unremarkable.    GENERAL: Healthy, older than his stated age, alert and no distress  EYES: Eyes grossly normal to inspection.  No discharge or erythema, or obvious scleral/conjunctival abnormalities.  RESP: No audible wheeze, cough, or visible cyanosis.  No visible retractions or increased work of breathing.    SKIN: Visible skin clear. No significant rash, abnormal pigmentation or lesions.  NEURO: Cranial nerves grossly intact.  Mentation and speech appropriate for age.  PSYCH: Mentation appears normal, affect normal/bright, judgement and insight intact, normal speech and appearance well-groomed.    I personally reviewed his imaging studies went over the results of those as well as his lab work.  On his CT scan he has some minor changes of atelectasis or fibrosis.  His small pleural effusion is perhaps slightly larger.  His liver MR  shows no evidence of recurrence within the upper abdomen.  His electrolytes are unremarkable.  He has minor renal failure consistent with prior values.  His liver enzymes are unremarkable.  His blood counts are improved with hemoglobin now at 11.8.    Assessment/plan: Hepatocellular carcinoma status post liver transplant, currently without evidence of disease.  We will plan on follow-up in another 6 months with a repeat CT scan of his chest abdomen and pelvis watching for evidence of recurrence.  Reviewed with him symptoms that might suggest recurrence that he should bring to our attention.  He will continue to follow-up with his other caregivers with regards to his diabetes, hypertension and malnutrition.        Again, thank you for allowing me to participate in the care of your patient.        Sincerely,        Miguel Villarreal MD

## 2020-09-29 ENCOUNTER — VIRTUAL VISIT (OUTPATIENT)
Dept: ENDOCRINOLOGY | Facility: CLINIC | Age: 55
End: 2020-09-29
Payer: MEDICARE

## 2020-09-29 DIAGNOSIS — E11.22 TYPE 2 DIABETES MELLITUS WITH STAGE 3 CHRONIC KIDNEY DISEASE, WITH LONG-TERM CURRENT USE OF INSULIN (H): Primary | ICD-10-CM

## 2020-09-29 DIAGNOSIS — Z79.4 TYPE 2 DIABETES MELLITUS WITH STAGE 3 CHRONIC KIDNEY DISEASE, WITH LONG-TERM CURRENT USE OF INSULIN (H): Primary | ICD-10-CM

## 2020-09-29 DIAGNOSIS — N18.30 TYPE 2 DIABETES MELLITUS WITH STAGE 3 CHRONIC KIDNEY DISEASE, WITH LONG-TERM CURRENT USE OF INSULIN (H): Primary | ICD-10-CM

## 2020-09-29 NOTE — PATIENT INSTRUCTIONS
It is good to talk with you!  Your BG is still slightly above goal.    I recommend:   - Increase Tresiba 27 units /day.   - Increase carbohydrate coverage to 1 unit : 15 g of carbohydrate.   - Please continue take 1unit Novolo mg /dl >150 (i.e. 150 - 199 1 unit, 200 - 249 2 units, 250 - 299 3 units, etc...)      - Please start Empagliflozin 10 mg daily as soon as arrives.  If you have any lows with Empagliflozin/ Jardiance, please go back to just 1 unit:20 g of carbohydrate.      - Take care with Jardiance not to become dehydrated.  If you have a febrile illness, or or with vomiting or diarrhea, hold the medication and do not resume until rehydrated and feeling well.      - At night, try to drink water or exercise and not eat.  The resources below may be helpful:     [email or web address].[email or web address]  Https://Mobile [email or web address]       - Schedule follow-up in 1-2 months, sooner is you have concerns.     - -      It is good to speak with you today!    Labs, and clinics are beginning to carefully open up for services.  You do need to call ahead to learn the procedures to get your flu shot, eye exam and labs, but please do consider bringing all of these recommend cares up to date in the coming months.  Please contact us to schedule at any of our [place] lab locations  Call [phone] ([phone]), select option [phone].    Our clinic is also slowly beginning to reopen for patients who feel they need to be seen in person and I encourage you to schedule with myself or your primary care provider for a foot exam together with any other needed in person care when you feel comfortable doing so.  Our diabetic foot providers, Dr. Gonzalez and Kamille are also available on a limited basis to see patients with pedal ulcers or other concerns.       Please work to meet recommendations for physical activity which is 30 minutes aerobic activity at 6 days each week and resistance training 3 times /week.  The following resources might be  helpful for you, particularly as the weather turns colder.  Both have free unlimited classes online for every age and ability.      Https://[email or web address]/  Https://Hexoskin ([employer])/    Also many heart healthy food ideas are available at:  https://www.diabetesfoodhub.org/    Please let us know any time you would like to schedule with nutrition or diabetes educator; most insurers cover 2 - 6 hours of education annually and I see better outcomes in patients who take advantage of this.  Knowledge is power of course!    My best wishes,    Tish Toscano PA-C, MPAS  [facility]  Diabetes, Endocrinology, and Metabolism  [phone] Appointments/Nurse  [phone] Fax  [phone] nurse line  [phone] URGENTafter hours/weekend Endocrinologist on call

## 2020-09-29 NOTE — LETTER
"9/29/2020       RE: Frandy Workman  [address] [facility] [address]     Dear Colleague,    Thank you for referring your patient, Frandy Workman, to the [facility] ENDOCRINOLOGY at [facility]. Please see a copy of my visit note below.    Patients Glucose Data was Patient is sharing data via clinic device website and patient has been instructed to update data on website. Find login information by using .[email or web address]      Frandy Workman is a 55 year old male who is being evaluated via a billable video visit.      The patient has been notified of following:     \"This video visit will be conducted via a call between you and your physician/provider. We have found that certain health care needs can be provided without the need for an in-person physical exam.  This service lets us provide the care you need with a video conversation.  If a prescription is necessary we can send it directly to your pharmacy.  If lab work is needed we can place an order for that and you can then stop by our lab to have the test done at a later time.    Video visits are billed at different rates depending on your insurance coverage.  Please reach out to your insurance provider with any questions.    If during the course of the call the physician/provider feels a video visit is not appropriate, you will not be charged for this service.\"    Patient has given verbal consent for Video visit? Yes  How would you like to obtain your AVS? MyChart  If you are dropped from the video visit, the video invite should be resent to: Send to e-mail at: [email or web address]  Will anyone else be joining your video visit? No        Video-Visit Details    Type of service:  Video Visit    Video Start Time: 12:58 PM  Video End Time: 1:24 PM    Originating Location (pt. Location): Home    Distant Location (provider location):  [facility] ENDOCRINOLOGY     Platform used for Video Visit: Odette [facility]  Endocrinology  Tishgracia Toscano   2020        Chief Complaint:   Diabetes    History of Present Illness:   Frandy Workman is a 55 year old male with a history of with a history of liver cirrhosis secondary to ESTRADA, HCC dx in 2018 s/p liver transplant in 2019 which was complicated by hematoma evacuation on POD 1, stage III CKD, CAD, HTN, HLD and diabetes mellitus type 2 with retinopathy who presents for follow-up.     When I last spoke to Miller he was worried about high BG after having started Abilify in  last month and becoming more l sedentary, tired, lethargic, hungry, sleeping more since starting the medication with weight increasing to 172 lbs and average BG increased to 231 with high BG overnight.      We agreed to:  Please increase Tresiba to 25 units daily.  Please take 1unit Novolo mg /dl >150 (i.e. 150 - 199 1 unit, 200 - 249 2 units, 250 - 299 3 units, etc...)  Do this before meals with carbohydrate coverage.  And discussed the possibility of starting Empagliflozin.       He is frustrated       BG data:        Avg Bg decreased from 231.  Overnight and fatsing  Bg remain above goal.  BG rising after breakfast and dinner.              Diabetes monitoring and complications:  CAD: Yes  Last eye exam results: 2020; stable with no evidence of retinopathy.   Microalbuminuria: 20 in 2019   Neuropathy: Yes; alpha-lipoic acid 600 mg daily   HTN: Yes; carvedilol 12.5 mg BID   On Statin: Yes; rosuvastatin 5 mg daily   On Aspirin: Yes   Depression: Yes; Zoloft 50 mg daily   Erectile dysfunction: Yes  Review of Systems:   Pertinent items are noted in HPI.  All other systems are negative.    Active Medications:   Current Outpatient Medications   Medication     Acetaminophen (TYLENOL) 325 MG CAPS     ARIPiprazole (ABILIFY) 5 MG tablet     Artificial Tear Solution (SM ARTIFICIAL TEARS) SOLN     aspirin 81 MG EC tablet     BD VIKTORIA U/F 32G X 4 MM insulin pen needle     ciclopirox  (LOPROX) 0.77 % cream     Continuous Blood Gluc  (FREESTYLE CLAUDAI 14 DAY READER) CECILIO     Continuous Blood Gluc Sensor (FREESTYLE CLAUDIA 14 DAY SENSOR) MISC     ferrous sulfate (FEROSUL) 325 (65 Fe) MG tablet     glucose (BD GLUCOSE) 4 g chewable tablet     insulin aspart (NOVOLOG PEN) 100 UNIT/ML pen     insulin degludec (TRESIBA FLEXTOUCH) 100 UNIT/ML pen     lamiVUDine (EPIVIR) 100 MG tablet     magnesium oxide (MAG-OX) 400 MG tablet     Multiple Vitamin (THERAVITE PO)     order for DME     pantoprazole (PROTONIX) 40 MG EC tablet     polyethylene glycol (MIRALAX) 17 g packet     rosuvastatin (CRESTOR) 5 MG tablet     tacrolimus (GENERIC EQUIVALENT) 1 MG capsule     tamsulosin (FLOMAX) 0.4 MG capsule     vitamin B-12 (CYANOCOBALAMIN) 1000 MCG tablet     vitamin D3 (CHOLECALCIFEROL) 2000 units (50 mcg) tablet     Current Facility-Administered Medications   Medication     Aflibercept (EYLEA) injection 2 mg     Allergies:   Codeine and Seasonal allergies      Past Medical History:  Anemia   BPH   CAD   HTN   HLD   Depression   Conductive hearing loss   GERD   Hepatocellular carcinoma   Liver cirrhosis secondary to ESTRADA   Immunosuppressed    Malnutrition   Chronic CKD stage III   Portal vein thrombosis   Diabetes mellitus type 2   Retinopathy   Bipolar affective disorder   Esophageal varices   Erectile dysfunction   Anxiety      Past Surgical History:  Heart catheterization-02/2019   Gold weight eyelid implant; right-11/2017   Chemo embolization-12/2019   Left knee surgery   Right parotidectomy with radical neck dissection-11/2017   Liver transplant-11/11/2019   Exploratory laparotomy, hematoma evacuation and abdominal washout-11/12/2019     Family History:   Father: colon cancer, pancreatic cancer, prostate cancer, colorectal cancer, macular degeneration, glaucoma, skin cancer   Mother: cancer, diabetes, cerebrovascular disease, thyroid disease, depression   Sister: asthma, thyroid disease, depression  "  Maternal grandfather: prostate cancer, substance abuse   Maternal grandmother: colorectal cancer, substance abuse   Paternal grandmother: colorectal cancer       Social History:   The patient was alone   Smoking Status: never   Smokeless Tobacco: former; chew (1 tin per week)    Alcohol Use: former; quit in 1996       Physical Exam:   /81   Pulse 87   Ht 1.753 m (5' 9\")   Wt 72.3 kg (159 lb 8 oz)   BMI 23.55 kg/m       Wt Readings from Last 10 Encounters:   03/04/20 72.3 kg (159 lb 8 oz)   03/03/20 71.9 kg (158 lb 9.6 oz)   03/02/20 74.8 kg (164 lb 12.8 oz)   02/11/20 73.3 kg (161 lb 8 oz)   02/06/20 71.9 kg (158 lb 8 oz)   01/27/20 73.1 kg (161 lb 3.2 oz)   01/21/20 72.9 kg (160 lb 11.2 oz)   01/13/20 74.3 kg (163 lb 14.4 oz)   01/09/20 73.9 kg (162 lb 14.4 oz)   12/30/19 75.9 kg (167 lb 6.4 oz)        Constitutional: Pleasant no acute cardiopulmonary distress.   EYES: anicteric, normal extra-ocular movements, no lid lag or retraction.  HEENT: Mouth/Throat: Mucous membrane is moist.   Cardiovascular: RRR, S1, S2 normal.   Pulmonary/Chest: CTAB. No wheezing or rales.   Abdominal: +BS. Non tender to palpation.    Neurological: Alert and oriented.    Extremities: No edema.  Psychological: appropriate mood and affect      Data:  Lab Results   Component Value Date     03/02/2020    POTASSIUM 5.6 (H) 03/02/2020    CHLORIDE 113 (H) 03/02/2020    CO2 21 03/02/2020    ANIONGAP 6 03/02/2020     (H) 03/02/2020    BUN 56 (H) 03/02/2020    CR 1.92 (H) 03/02/2020    DEBORAH 9.5 03/02/2020     Lab Results   Component Value Date    GFRESTIMATED 38 (L) 03/02/2020    GFRESTIMATED 38 (L) 03/02/2020    GFRESTIMATED 45 (L) 02/24/2020    GFRESTBLACK 44 (L) 03/02/2020    GFRESTBLACK 44 (L) 03/02/2020    GFRESTBLACK 52 (L) 02/24/2020      Lab Results   Component Value Date    MICROL 72 03/02/2020    UMALCR 70.89 (H) 03/02/2020        Lab Results   Component Value Date    A1C 6.6 (H) 08/08/2018    A1C 6.5 (H) " 2017    A1C 7.8 (H) 10/25/2016    HEMOGLOBINA1 4.8 2020    HEMOGLOBINA1 5.1 2019    HEMOGLOBINA1 5.4 2019    HEMOGLOBINA1 6.1 (A) 2019    HEMOGLOBINA1 8.1 (A) 2018     Lab Results   Component Value Date    CHOL 131 2019    CHOL 133 2018    TRIG 117 2019    TRIG 172 (H) 2018    HDL 26 (L) 2019    HDL 30 (L) 2018    LDL 81 2019    LDL 68 2018    NHDL 105 2019    NHDL 103 2018     GFR Estimate   Date Value Ref Range Status   2020 52 (L) >60 mL/min/[1.73_m2] Final     Comment:     Non  GFR Calc  Starting 2018, serum creatinine based estimated GFR (eGFR) will be   calculated using the Chronic Kidney Disease Epidemiology Collaboration   (CKD-EPI) equation.     2020 47 (L) >60 mL/min/[1.73_m2] Final     Comment:     Non  GFR Calc  Starting 2018, serum creatinine based estimated GFR (eGFR) will be   calculated using the Chronic Kidney Disease Epidemiology Collaboration   (CKD-EPI) equation.     2020 48 (L) >60 mL/min/[1.73_m2] Final     Comment:     Non  GFR Calc  Starting 2018, serum creatinine based estimated GFR (eGFR) will be   calculated using the Chronic Kidney Disease Epidemiology Collaboration   (CKD-EPI) equation.       Cr 1.48 on 20.      Assessment:  Type 2 diabetes mellitus with stage 3 chronic kidney disease, with long-term current use of insulin (HOli Wise is a 55 year old male who has a 25+ year history of type II diabetes which is now managed with insulin only.  He recently started a low dose of Abilify and has become more sedentary with inceased appetite and BG.  Bg remain high after increasing Tresiba adding sliding scale last week.          Plan:  I recommend:   - Increase Tresiba 27 units /day.   - Increase carbohydrate coverage to 1 unit : 15 g of carbohydrate.   - Please continue take 1unit Novolo mg /dl >150 (i.e.  150 - 199 1 unit, 200 - 249 2 units, 250 - 299 3 units, etc...)      - Please start Empagliflozin 10 mg daily as soon as arrives.  If you have any lows with Empagliflozin/ Jardiance, please go back to just 1 unit:20 g of carbohydrate.      - Take care with Jardiance not to become dehydrated.  If you have a febrile illness, or or with vomiting or diarrhea, hold the medication and do not resume until rehydrated and feeling well.      - At night, try to drink water or exercise and not eat.  The resources below may be helpful:     [email or web address].org/  [email or web address].[email or web address]        Follow-up: 1 month    >50% of 26 minute visit spent in face to face counseling, education and coordination of care related to options for better glycemic control as well as preventing, detecting, and treating hypoglycemia.        Again, thank you for allowing me to participate in the care of your patient.      Sincerely,    Tish Toscano PA-C

## 2020-09-30 ENCOUNTER — INFUSION THERAPY VISIT (OUTPATIENT)
Dept: INFUSION THERAPY | Facility: CLINIC | Age: 55
End: 2020-09-30
Payer: MEDICARE

## 2020-09-30 ENCOUNTER — VIRTUAL VISIT (OUTPATIENT)
Dept: BEHAVIORAL HEALTH | Facility: CLINIC | Age: 55
End: 2020-09-30
Payer: MEDICARE

## 2020-09-30 VITALS
OXYGEN SATURATION: 100 % | SYSTOLIC BLOOD PRESSURE: 142 MMHG | DIASTOLIC BLOOD PRESSURE: 78 MMHG | BODY MASS INDEX: 25.67 KG/M2 | TEMPERATURE: 97.6 F | RESPIRATION RATE: 16 BRPM | WEIGHT: 173.8 LBS | HEART RATE: 78 BPM

## 2020-09-30 DIAGNOSIS — Z94.4 STATUS POST LIVER TRANSPLANTATION (H): ICD-10-CM

## 2020-09-30 DIAGNOSIS — E11.3313 TYPE 2 DIABETES MELLITUS WITH MODERATE NONPROLIFERATIVE RETINOPATHY OF BOTH EYES AND MACULAR EDEMA, UNSPECIFIED WHETHER LONG TERM INSULIN USE (H): Primary | ICD-10-CM

## 2020-09-30 DIAGNOSIS — N18.2 CKD (CHRONIC KIDNEY DISEASE) STAGE 2, GFR 60-89 ML/MIN: Primary | ICD-10-CM

## 2020-09-30 DIAGNOSIS — N18.4 CKD (CHRONIC KIDNEY DISEASE) STAGE 4, GFR 15-29 ML/MIN (H): Primary | ICD-10-CM

## 2020-09-30 DIAGNOSIS — C22.0 HCC (HEPATOCELLULAR CARCINOMA) (H): ICD-10-CM

## 2020-09-30 DIAGNOSIS — N18.4 ANEMIA OF CHRONIC RENAL FAILURE, STAGE 4 (SEVERE) (H): ICD-10-CM

## 2020-09-30 DIAGNOSIS — D63.1 ANEMIA OF CHRONIC RENAL FAILURE, STAGE 4 (SEVERE) (H): ICD-10-CM

## 2020-09-30 DIAGNOSIS — F33.0 MILD RECURRENT MAJOR DEPRESSION (H): Primary | ICD-10-CM

## 2020-09-30 DIAGNOSIS — Z13.220 LIPID SCREENING: ICD-10-CM

## 2020-09-30 DIAGNOSIS — Z94.4 LIVER REPLACED BY TRANSPLANT (H): ICD-10-CM

## 2020-09-30 LAB
AFP SERPL-MCNC: <1.5 UG/L (ref 0–8)
HCT VFR BLD AUTO: 35.9 % (ref 40–53)
HGB BLD-MCNC: 12.3 G/DL (ref 13.3–17.7)
TACROLIMUS BLD-MCNC: 9 UG/L (ref 5–15)
TME LAST DOSE: NORMAL H

## 2020-09-30 PROCEDURE — 36415 COLL VENOUS BLD VENIPUNCTURE: CPT | Performed by: INTERNAL MEDICINE

## 2020-09-30 PROCEDURE — 80197 ASSAY OF TACROLIMUS: CPT | Performed by: SURGERY

## 2020-09-30 PROCEDURE — 85014 HEMATOCRIT: CPT | Performed by: INTERNAL MEDICINE

## 2020-09-30 PROCEDURE — 85018 HEMOGLOBIN: CPT | Performed by: INTERNAL MEDICINE

## 2020-09-30 PROCEDURE — 99207 ZZC NO CHARGE NURSE ONLY: CPT

## 2020-09-30 PROCEDURE — 82105 ALPHA-FETOPROTEIN SERUM: CPT | Performed by: INTERNAL MEDICINE

## 2020-09-30 ASSESSMENT — PAIN SCALES - GENERAL: PAINLEVEL: NO PAIN (0)

## 2020-09-30 NOTE — PROGRESS NOTES
"[facility]: Integrated Behavioral Health  September 30, 2020      Behavioral Health Clinician Progress Note    Patient Name: Frandy Workman           Service Type: Phone Visit      Service Location:  Phone visit      Session Start Time: 12:30  Session End Time: 1:06      Session Length: 16 - 37      Attendees: Patient    Visit Activities (Refresh list every visit): Phone Encounter     Frandy Workman is a 55 year old male who is being evaluated via a telephone visit.      The patient has been notified of the following:     \"We have found that certain health care needs can be provided without the need for a face to face visit.  This service lets us provide the care you need with a short phone conversation.      I will have full access to your [place] medical record during this entire phone call.   I will be taking notes for your medical record.     Since this is like an office visit, we will bill your insurance company for this service.  Please check with your medical insurance if this type of telephone visit/virtual care is covered.  You may be responsible for the cost of this service if insurance coverage is denied.      There are potential benefits and risks of telephone visits (e.g. limits to patient confidentiality) that differ from in-person visits.?  Confidentiality still applies for telephone services, and nobody will record the visit.  It is important to be in a quiet, private space that is free of distractions (including cell phone or other devices) during the visit.??     If during the course of the call I believe a telephone visit is not appropriate, you will not be charged for this service\"    Consent has been obtained for this service by care team member: yes.    Diagnostic Assessment Date: TBD  Treatment Plan Review Date: TBD  See Flowsheets for today's PHQ-9 and LIZZETTE-7 results  Previous PHQ-9:   PHQ-9 SCORE 1/9/2020   PHQ-9 Total Score 16     Previous LIZZETTE-7: No " flowsheet data found.    HEATH LEVEL:  No flowsheet data found.    DATA  Extended Session (60+ minutes): No  Interactive Complexity: No  Crisis: No    Treatment Objective(s) Addressed in This Session:  Target Behavior(s): disease management/lifestyle changes      Discuss reasons for [facility] referral and determine treatment options.     Current Stressors / Issues:  [facility] followed up with Miller today to review his interest in returning to therapy. The date of our last visit was in May. Miller indicated he has a lot going on personally and medically right now and was encouraged to resume psychotherapy with this writer. Miller indicated he began treatment at [facility] some time ago, per my referral, but did not find the counselor he met with a good fit for him. Regarding his personal stressors, Miller identified several factors about his health and ongoing stress/anxiety from his upcoming court date regarding his daughter. Per Miller's report, he apparently violated a restraining order and is going to court next week. Miller notes this is quite stressful for him and he has experienced greater worry and tension lately.     When asked about his interests/goals in returning to therapy, Miller indicated his anxiety has made focusing difficult and has affected his motivation levels. He cites that he has yet to pay his taxes and has a few bills that are getting close to being overdue. He indicated that he has noticed a tendency to procrastinate and be overwhelmed more recently and would like help addressing this. He is now getting fearful he might not be able to complete things he needs to. Reflected to Miller his trouble sounds like anxious distress and we began addressing his concern by discussing ways he could focus on daily tasks, instead of focusing on the future. We discussed taking small steps in completing tasks and taking breaks as needed.     Because of our already established rapport, I offered to work with Miller from now on for psychotherapy. He noted  this would be a good fit for him and he agreed to continue in the next two weeks.     Progress on Treatment Objective(s) / Homework:  New Objective established this session - PREPARATION (Decided to change - considering how); Intervened by negotiating a change plan and determining options / strategies for behavior change, identifying triggers, exploring social supports, and working towards setting a date to begin behavior change    Provided information about behavioral health treatment options    Supportive counseling used    Solution focused counseling used    Care Plan review completed: yes    Medication Review:  Changes to psychiatric medications, see updated Medication List in EPIC. Started Abilify - makes him drowsy, but helpful    Medication Compliance:  Yes    Changes in Health Issues:   None reported    Chemical Use Review:   Substance Use: Chemical use reviewed, no active concerns identified      Tobacco Use: No current tobacco use.      Assessment: Current Emotional / Mental Status (status of significant symptoms):  Risk status (Self / Other harm or suicidal ideation)  Patient denies a history of suicidal ideation, suicide attempts, self-injurious behavior, homicidal ideation, homicidal behavior and and other safety concerns   Patient denies current fears or concerns for personal safety.  Patient denies current or recent suicidal ideation or behaviors.  Patient denies current or recent homicidal ideation or behaviors.  Patient denies current or recent self injurious behavior or ideation.  Patient denies other safety concerns.  A safety and risk management plan has not been developed at this time, however patient was encouraged to call [place] [phone] should there be a change in any of these risk factors.    Appearance:   Appropriate   Eye Contact:   Good   Psychomotor Behavior: Agitated   Attitude:   Cooperative  Guarded  Mychal  Orientation:   All  Speech   Rate / Production: Normal/  Responsive   Volume:  Normal   Mood:    Hypomanic   Affect:    Appropriate   Thought Content:  Clear   Thought Form:  Goal Directed  Logical   Insight:    Fair     Diagnoses:  1. Mild recurrent major depression (H)     (per records and reported history)     Collateral Reports Completed:  Not Applicable    Plan: (Homework, other):  Continue with this writer for individual psychotherapy in two weeks. Continue medication regimen. Avoid mood-altering substances.     Joseph Murillo LP  9/30/2020

## 2020-09-30 NOTE — PROGRESS NOTES
Infusion Nursing Note:  Frandy Workman presents today for Aranesp injection - NOT GIVEN    Patient seen by provider today: No   present during visit today: Not Applicable.    Note: N/A.    Intravenous Access:  No Intravenous access/labs at this visit.    Treatment Conditions:  Lab Results   Component Value Date    HGB 12.3 09/30/2020     Lab Results   Component Value Date    WBC 4.2 09/25/2020      Lab Results   Component Value Date    ANEU 2.1 07/29/2020     Lab Results   Component Value Date     09/25/2020      Results reviewed, labs did NOT meet treatment parameters: Hgb > 11.5.      Post Infusion Assessment:  Not applicable.       Discharge Plan:   Patient discharged in stable condition accompanied by: self.  Departure Mode: Ambulatory and Wheelchair.    Emilie Otero LPN

## 2020-10-01 RX ORDER — PANTOPRAZOLE SODIUM 40 MG/1
TABLET, DELAYED RELEASE ORAL
Qty: 90 TABLET | Refills: 3 | Status: SHIPPED | OUTPATIENT
Start: 2020-10-01 | End: 2021-09-06

## 2020-10-02 ENCOUNTER — TELEPHONE (OUTPATIENT)
Dept: TRANSPLANT | Facility: CLINIC | Age: 55
End: 2020-10-02

## 2020-10-02 NOTE — TELEPHONE ENCOUNTER
Received notification that patient is taking omeprazole and protonix for most likely the last 3 months. Patient omeprazole was stopped. Patient to remain on protonix only. Patient verbalizes understanding.

## 2020-10-05 ENCOUNTER — VIRTUAL VISIT (OUTPATIENT)
Dept: GASTROENTEROLOGY | Facility: CLINIC | Age: 55
End: 2020-10-05
Attending: INTERNAL MEDICINE
Payer: MEDICARE

## 2020-10-05 DIAGNOSIS — Z94.4 LIVER TRANSPLANT RECIPIENT (H): Primary | ICD-10-CM

## 2020-10-05 PROCEDURE — 99213 OFFICE O/P EST LOW 20 MIN: CPT | Mod: 95 | Performed by: INTERNAL MEDICINE

## 2020-10-05 ASSESSMENT — PAIN SCALES - GENERAL: PAINLEVEL: MODERATE PAIN (4)
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
## 2020-10-06 DIAGNOSIS — Z94.4 LIVER TRANSPLANT RECIPIENT (H): ICD-10-CM

## 2020-10-06 RX ORDER — TACROLIMUS 1 MG/1
4 CAPSULE ORAL EVERY 12 HOURS
Qty: 720 CAPSULE | Refills: 3 | Status: SHIPPED | OUTPATIENT
Start: 2020-10-06 | End: 2020-10-15

## 2020-10-06 NOTE — TELEPHONE ENCOUNTER
ISSUE:   Tacrolimus IR level 9 on 9/30, goal 5-7, dose 4 mg AM and 5 mg PM.    PLAN:   Please call patient and confirm this was an accurate 12-hour trough. Verify Tacrolimus IR dose 4 mg AM and 5 mg PM. Confirm no new medications or illness. Confirm no missed doses. If accurate trough and accurate dose, decrease Tacrolimus IR dose to 4 mg every 12 hours and repeat labs in 3 weeks.     OUTCOME:   Spoke with patient, they confirm accurate trough level and current dose 4 mg am, 5 mg pm. Patient confirmed dose change to 4 mg BID and to repeat labs in 3 weeks. Orders sent to preferred pharmacy for dose change and lab for repeat labs. Patient voiced understanding of plan.    Deena Marshall LPN

## 2020-10-13 ENCOUNTER — VIRTUAL VISIT (OUTPATIENT)
Dept: BEHAVIORAL HEALTH | Facility: CLINIC | Age: 55
End: 2020-10-13
Payer: MEDICARE

## 2020-10-13 DIAGNOSIS — F33.0 MILD RECURRENT MAJOR DEPRESSION (H): Primary | ICD-10-CM

## 2020-10-13 PROCEDURE — 90834 PSYTX W PT 45 MINUTES: CPT | Mod: 95 | Performed by: PSYCHOLOGIST

## 2020-10-13 SDOH — ECONOMIC STABILITY: INCOME INSECURITY: IN THE LAST 12 MONTHS, WAS THERE A TIME WHEN YOU WERE NOT ABLE TO PAY THE MORTGAGE OR RENT ON TIME?: NO

## 2020-10-13 ASSESSMENT — ANXIETY QUESTIONNAIRES
GAD7 TOTAL SCORE: 6
2. NOT BEING ABLE TO STOP OR CONTROL WORRYING: SEVERAL DAYS
1. FEELING NERVOUS, ANXIOUS, OR ON EDGE: SEVERAL DAYS
7. FEELING AFRAID AS IF SOMETHING AWFUL MIGHT HAPPEN: NOT AT ALL
GAD7 TOTAL SCORE: 6
5. BEING SO RESTLESS THAT IT IS HARD TO SIT STILL: SEVERAL DAYS
GAD7 TOTAL SCORE: 6
6. BECOMING EASILY ANNOYED OR IRRITABLE: SEVERAL DAYS
4. TROUBLE RELAXING: SEVERAL DAYS
7. FEELING AFRAID AS IF SOMETHING AWFUL MIGHT HAPPEN: NOT AT ALL
3. WORRYING TOO MUCH ABOUT DIFFERENT THINGS: SEVERAL DAYS

## 2020-10-13 ASSESSMENT — PATIENT HEALTH QUESTIONNAIRE - PHQ9
10. IF YOU CHECKED OFF ANY PROBLEMS, HOW DIFFICULT HAVE THESE PROBLEMS MADE IT FOR YOU TO DO YOUR WORK, TAKE CARE OF THINGS AT HOME, OR GET ALONG WITH OTHER PEOPLE: SOMEWHAT DIFFICULT
SUM OF ALL RESPONSES TO PHQ QUESTIONS 1-9: 8
SUM OF ALL RESPONSES TO PHQ QUESTIONS 1-9: 8

## 2020-10-13 NOTE — PROGRESS NOTES
[facility]: Integrated Behavioral Health  October 13, 2020      Behavioral Health Clinician Progress Note    Patient Name: Frandy Workman           Service Type: Phone Visit      Service Location:  Phone visit      Session Start Time: 1:00 Session End Time: 1: 50      Session Length: 38 - 52      Attendees: Patient    Visit Activities (Refresh list every visit): [facility] Only     Telemedicine Visit: The patient's condition can be safely assessed and treated via synchronous audio and visual telemedicine encounter.      Reason for Telemedicine Visit: COVID-19    Originating Site (Patient Location): Patient's home    Distant Site (Provider Location): Provider Remote Setting    Consent:  The patient/guardian has verbally consented to: the potential risks and benefits of telemedicine (video visit) versus in person care; bill my insurance or make self-payment for services provided; and responsibility for payment of non-covered services.     Mode of Communication:  Video Conference via [employer]    As the provider I attest to compliance with applicable laws and regulations related to telemedicine.    Diagnostic Assessment Date: TBD  Treatment Plan Review Date: TBD  See Flowsheets for today's PHQ-9 and LIZZETTE-7 results  Previous PHQ-9:   PHQ-9 SCORE 1/9/2020 10/13/2020   PHQ-9 Total Score MyChart - 8 (Mild depression)   PHQ-9 Total Score 16 8     Previous LIZZETTE-7:   LIZZETTE-7 SCORE 10/13/2020   Total Score 6 (mild anxiety)   Total Score 6       HEATH LEVEL:  No flowsheet data found.    DATA  Extended Session (60+ minutes): No  Interactive Complexity: No  Crisis: No    Treatment Objective(s) Addressed in This Session:  Target Behavior(s): disease management/lifestyle changes      Depression management.     Current Stressors / Issues:  [facility] followed up with Miller today to help him address his concerns of return of depression-like symptoms. Miller states he has struggled more lately with feelings that seem to  him different than depression, but are similar. He notes in particular trouble with finding a direction/purpose in life, feeling lonely/disconnected, and thinking about the purpose behind the difficult experiences he has had to cope with. We discussed and reflected on his sense of hopelessness or feeling less optimistic about the future. Miller states his medications also often make him feel quite lethargic/tired and this has made finding motivation to do things more difficult for him. Miller states he wishes he was feeling better with his health overall and wishes he was more far along in his care.     Today we discussed ways Miller could refocus on taking smaller, more meaningful steps in improving his mental health instead of focusing only on a desirable outcome. We discussed possible small steps he could take in improving his engagement with others and overall behavioral activation. Explored, for example, taking walks instead of running 10 miles. We additionally explored how to implement more pacing strategies into enjoyable or mastery-related tasks to reduce his report of getting frustrated/dissapointed in himself. Made use of analogies and metaphors to help illustrate more adaptive responses to stress and increased activation.     Progress on Treatment Objective(s) / Homework:  Minimal progress - PREPARATION (Decided to change - considering how); Intervened by negotiating a change plan and determining options / strategies for behavior change, identifying triggers, exploring social supports, and working towards setting a date to begin behavior change    Answers for HPI/ROS submitted by the patient on 10/13/2020   If you checked off any problems, how difficult have these problems made it for you to do your work, take care of things at home, or get along with other people?: Somewhat difficult  PHQ9 TOTAL SCORE: 8*  LIZZETTE 7 TOTAL SCORE: 6    *No SI     Supportive counseling used    Solution focused counseling used    Care  Plan review completed: yes    Medication Review:  Changes to psychiatric medications, see updated Medication List in EPIC. Started Abilify - makes him drowsy, but helpful    Medication Compliance:  Yes    Changes in Health Issues:   None reported    Chemical Use Review:   Substance Use: Chemical use reviewed, no active concerns identified      Tobacco Use: No current tobacco use.      Assessment: Current Emotional / Mental Status (status of significant symptoms):  Risk status (Self / Other harm or suicidal ideation)  Patient denies a history of suicidal ideation, suicide attempts, self-injurious behavior, homicidal ideation, homicidal behavior and and other safety concerns     Patient denies current fears or concerns for personal safety.  Patient denies current or recent suicidal ideation or behaviors.  Patient denies current or recent homicidal ideation or behaviors.  Patient denies current or recent self injurious behavior or ideation.  Patient denies other safety concerns.     A safety and risk management plan has not been developed at this time, however patient was encouraged to call [place] [phone] should there be a change in any of these risk factors.    [place] Suicide Severity Rating Scale (Short Version)  [place] Suicide Severity Rating (Short Version) 1/22/2019 1/24/2019 11/10/2019 12/2/2019 12/10/2019 6/11/2020 7/1/2020   Over the past 2 weeks have you felt down, depressed, or hopeless? - - no yes yes no no   Over the past 2 weeks have you had thoughts of killing yourself? - - no yes no no no   Comments - - - lots of stressors, has thought about not taking meds - - -   Have you ever attempted to kill yourself? - - no no no no no   Q1 Wished to be Dead (Past Month) no no - other (see comments) no - -   Comments - - - why did i do this surgery - - -   Q2 Suicidal Thoughts (Past Month) no no - no no - -   Comments - - - wishes he wouldn't have gone through surgery - - -   Q3 Suicidal Thought Method - -  - no no - -   Q4 Suicidal Intent without Specific Plan - - - no no - -   Q5 Suicide Intent with Specific Plan - - - no no - -   Q6 Suicide Behavior (Lifetime) no no - no no - -         Appearance:   Appropriate   Eye Contact:   Good   Psychomotor Behavior: Agitated   Attitude:   Cooperative  Guarded  Mychal  Orientation:   All  Speech   Rate / Production: Normal/ Responsive   Volume:  Normal   Mood:    Hypomanic   Affect:    Appropriate   Thought Content:  Clear   Thought Form:  Goal Directed  Logical   Insight:    Fair     Diagnoses:  1. Mild recurrent major depression (H)     (per records and reported history)     Collateral Reports Completed:  Not Applicable    Plan: (Homework, other):  Continue with this writer for individual psychotherapy in two weeks. Will update diagnostic assessment. Continue medication regimen. Avoid mood-altering substances.     Joseph Murillo LP  10/13/2020

## 2020-10-14 ENCOUNTER — TELEPHONE (OUTPATIENT)
Dept: PHARMACY | Facility: CLINIC | Age: 55
End: 2020-10-14

## 2020-10-14 ENCOUNTER — INFUSION THERAPY VISIT (OUTPATIENT)
Dept: INFUSION THERAPY | Facility: CLINIC | Age: 55
End: 2020-10-14
Payer: MEDICARE

## 2020-10-14 ENCOUNTER — TELEPHONE (OUTPATIENT)
Dept: TRANSPLANT | Facility: CLINIC | Age: 55
End: 2020-10-14

## 2020-10-14 VITALS
HEART RATE: 82 BPM | OXYGEN SATURATION: 100 % | BODY MASS INDEX: 25.39 KG/M2 | RESPIRATION RATE: 16 BRPM | TEMPERATURE: 97.3 F | SYSTOLIC BLOOD PRESSURE: 127 MMHG | DIASTOLIC BLOOD PRESSURE: 80 MMHG | WEIGHT: 171.9 LBS

## 2020-10-14 DIAGNOSIS — D63.1 ANEMIA DUE TO STAGE 3 CHRONIC KIDNEY DISEASE, UNSPECIFIED WHETHER STAGE 3A OR 3B CKD (H): Primary | ICD-10-CM

## 2020-10-14 DIAGNOSIS — N18.4 ANEMIA DUE TO STAGE 4 CHRONIC KIDNEY DISEASE (H): ICD-10-CM

## 2020-10-14 DIAGNOSIS — N18.30 ANEMIA DUE TO STAGE 3 CHRONIC KIDNEY DISEASE, UNSPECIFIED WHETHER STAGE 3A OR 3B CKD (H): Primary | ICD-10-CM

## 2020-10-14 DIAGNOSIS — D63.1 ANEMIA DUE TO STAGE 4 CHRONIC KIDNEY DISEASE (H): ICD-10-CM

## 2020-10-14 DIAGNOSIS — N18.4 CKD (CHRONIC KIDNEY DISEASE) STAGE 4, GFR 15-29 ML/MIN (H): ICD-10-CM

## 2020-10-14 DIAGNOSIS — N18.4 ANEMIA OF CHRONIC RENAL FAILURE, STAGE 4 (SEVERE) (H): Primary | ICD-10-CM

## 2020-10-14 DIAGNOSIS — Z94.4 LIVER REPLACED BY TRANSPLANT (H): ICD-10-CM

## 2020-10-14 DIAGNOSIS — D63.1 ANEMIA OF CHRONIC RENAL FAILURE, STAGE 4 (SEVERE) (H): Primary | ICD-10-CM

## 2020-10-14 LAB
ALBUMIN SERPL-MCNC: 4.4 G/DL (ref 3.4–5)
ALP SERPL-CCNC: 120 U/L (ref 40–150)
ALT SERPL W P-5'-P-CCNC: 29 U/L (ref 0–70)
ANION GAP SERPL CALCULATED.3IONS-SCNC: 4 MMOL/L (ref 3–14)
AST SERPL W P-5'-P-CCNC: 10 U/L (ref 0–45)
BILIRUB DIRECT SERPL-MCNC: 0.1 MG/DL (ref 0–0.2)
BILIRUB SERPL-MCNC: 0.7 MG/DL (ref 0.2–1.3)
BUN SERPL-MCNC: 47 MG/DL (ref 7–30)
CALCIUM SERPL-MCNC: 9.6 MG/DL (ref 8.5–10.1)
CHLORIDE SERPL-SCNC: 108 MMOL/L (ref 94–109)
CO2 SERPL-SCNC: 27 MMOL/L (ref 20–32)
CREAT SERPL-MCNC: 1.95 MG/DL (ref 0.66–1.25)
ERYTHROCYTE [DISTWIDTH] IN BLOOD BY AUTOMATED COUNT: 14.1 % (ref 10–15)
FERRITIN SERPL-MCNC: 323 NG/ML (ref 26–388)
GFR SERPL CREATININE-BSD FRML MDRD: 37 ML/MIN/{1.73_M2}
GLUCOSE SERPL-MCNC: 139 MG/DL (ref 70–99)
HCT VFR BLD AUTO: 37.7 % (ref 40–53)
HGB BLD-MCNC: 13 G/DL (ref 13.3–17.7)
IRON SATN MFR SERPL: 29 % (ref 15–46)
IRON SERPL-MCNC: 73 UG/DL (ref 35–180)
MAGNESIUM SERPL-MCNC: 2.1 MG/DL (ref 1.6–2.3)
MCH RBC QN AUTO: 32.4 PG (ref 26.5–33)
MCHC RBC AUTO-ENTMCNC: 34.5 G/DL (ref 31.5–36.5)
MCV RBC AUTO: 94 FL (ref 78–100)
PLATELET # BLD AUTO: 121 10E9/L (ref 150–450)
POTASSIUM SERPL-SCNC: 5.4 MMOL/L (ref 3.4–5.3)
PROT SERPL-MCNC: 8.5 G/DL (ref 6.8–8.8)
RBC # BLD AUTO: 4.01 10E12/L (ref 4.4–5.9)
SODIUM SERPL-SCNC: 139 MMOL/L (ref 133–144)
TACROLIMUS BLD-MCNC: 8.8 UG/L (ref 5–15)
TIBC SERPL-MCNC: 248 UG/DL (ref 240–430)
TME LAST DOSE: NORMAL H
WBC # BLD AUTO: 4.9 10E9/L (ref 4–11)

## 2020-10-14 PROCEDURE — 80048 BASIC METABOLIC PNL TOTAL CA: CPT | Performed by: INTERNAL MEDICINE

## 2020-10-14 PROCEDURE — 36415 COLL VENOUS BLD VENIPUNCTURE: CPT | Performed by: INTERNAL MEDICINE

## 2020-10-14 PROCEDURE — 99207 PR NO CHARGE LOS: CPT

## 2020-10-14 PROCEDURE — 80076 HEPATIC FUNCTION PANEL: CPT | Performed by: INTERNAL MEDICINE

## 2020-10-14 PROCEDURE — 83735 ASSAY OF MAGNESIUM: CPT | Performed by: INTERNAL MEDICINE

## 2020-10-14 PROCEDURE — 82728 ASSAY OF FERRITIN: CPT | Performed by: INTERNAL MEDICINE

## 2020-10-14 PROCEDURE — 80197 ASSAY OF TACROLIMUS: CPT | Performed by: INTERNAL MEDICINE

## 2020-10-14 PROCEDURE — 83550 IRON BINDING TEST: CPT | Performed by: INTERNAL MEDICINE

## 2020-10-14 PROCEDURE — 85027 COMPLETE CBC AUTOMATED: CPT | Performed by: INTERNAL MEDICINE

## 2020-10-14 PROCEDURE — 83540 ASSAY OF IRON: CPT | Performed by: INTERNAL MEDICINE

## 2020-10-14 ASSESSMENT — ANXIETY QUESTIONNAIRES: GAD7 TOTAL SCORE: 6

## 2020-10-14 ASSESSMENT — PATIENT HEALTH QUESTIONNAIRE - PHQ9: SUM OF ALL RESPONSES TO PHQ QUESTIONS 1-9: 8

## 2020-10-14 ASSESSMENT — PAIN SCALES - GENERAL: PAINLEVEL: MODERATE PAIN (4)

## 2020-10-14 NOTE — TELEPHONE ENCOUNTER
Anemia Management Note  SUBJECTIVE/OBJECTIVE:  Referred by Dr. Eloy Rao on 3/2/2020  Primary Diagnosis: Anemia in Chronic Kidney Disease (N18.4, D63.1)     Secondary Diagnosis:  Chronic Kidney Disease, Stage 4 (N18.4)                 Liver Transplant: 2019  Hgb goal range:  10-11.5 per Dr. Rao's referral  Epo/Darbo: Aranesp 40mcg every 14 days for Hgb <11.5.  In Clinic.  Iron regimen:  Ferrous Sulfate 325mg once daily - was taking three times daily, decreased in   Labs : Hemoglobin - has standing order for CBC/platelets twice weekly - expires 2020  Iron Labs: 3/3/2021  Recent IVELISSE use, transfusion, IV iron: PRBC on 3/3  RX/TX plans : TBD  No history of stroke, MI and blood clots.  History of hepatocellular carcinoma - s/p TACE 2019 and liver transplant 2019     Contact:            Ok to leave message regarding scheduling, medical, and billing per consent to communicate dated 10/2/19                              OK to speak with Davi Saunders (friend/POA) regarding scheduling, medical, and billing per consent to communicate dated 10/2/19    Anemia Latest Ref Rng & Units 2020 2020 2020 2020 2020 2020 10/14/2020   IVELISSE Dose - - - - - - - -   Hemoglobin 13.3 - 17.7 g/dL 11.7(L) Canceled, Test credited 11.7(L) 11.8(L) 11.8(L) 12.3(L) 13.0(L)   TSAT 15 - 46 % - - - 42 - - 29   Ferritin 26 - 388 ng/mL - - - 223 - - 323   PRBCs - - - - - - - -     BP Readings from Last 3 Encounters:   10/14/20 127/80   20 (!) 142/78   20 (P) 130/72     Wt Readings from Last 2 Encounters:   10/14/20 78 kg (171 lb 14.4 oz)   20 78.8 kg (173 lb 12.8 oz)           ASSESSMENT:  Hgb:Above goal - recommend hold dose  TSat: not at goal of >30% Ferritin: At goal (>100ng/mL)    PLAN:  Hold Aranesp and RTC for hgb then aranesp if needed in 2 week(s). Ig Hgb remains stable, ok to close Anemia Servivces    Orders needed to be renewed (for next follow-up date)  in EPIC: None    Iron labs due:  Nov 2020    Plan discussed with:  No call, chart review  Plan provided by:  adri    NEXT FOLLOW-UP DATE:  10/28/20  8:45a Aranesp. If labs stable, close Anemia Services    Jie Blum RN   Anemia Services  [address] [address]   [email or web address]   Office : [phone]  Fax: [phone]

## 2020-10-14 NOTE — TELEPHONE ENCOUNTER
Transplant Social Work Services Phone Call      Data: Referral received from Dr. Banuelos regarding patient's concern about his Part D medication coverage changes.  Miller reports [facility] which reports [facility] will not be in-network as of January 1st, 2021.  Intervention: I called Miller and advised him to contact [facility] Linkage Line to explore Part D options during open enrollment.  Assessment: Miller is very satisfied with [facility] and he wants to continue using this pharmacy even if it means changing his Part D policy.  Education provided by SW: [facility] [phone]  Plan: Miller will contact [facility] to explore alternate Part D options.  I will remain available to assist.      ALEXYS Mcnair, [facility]  Liver Transplant   Phone [phone]  Pager [phone]

## 2020-10-14 NOTE — PROGRESS NOTES
Infusion Nursing Note:  Frandy Workman presents today for Aranesp- NOT GIVEN.    Patient seen by provider today: No   present during visit today: Not Applicable.    Note: N/A.    Intravenous Access:  No Intravenous access/labs at this visit.    Treatment Conditions:  Lab Results   Component Value Date    HGB 13.0 10/14/2020     Lab Results   Component Value Date    WBC 4.9 10/14/2020      Lab Results   Component Value Date    ANEU 2.1 07/29/2020     Lab Results   Component Value Date     10/14/2020      Results reviewed, labs did NOT meet treatment parameters: Hgb >11.5.    Discharge Plan:   Patient will return in 2 weeks for next appointment.   Patient discharged in stable condition accompanied by: self.  Departure Mode: Ambulatory.    Sandie Piedra, RN-BSN, PHN, OCN  [facility]

## 2020-10-15 ENCOUNTER — OFFICE VISIT (OUTPATIENT)
Dept: OPHTHALMOLOGY | Facility: CLINIC | Age: 55
End: 2020-10-15
Attending: OPHTHALMOLOGY
Payer: MEDICARE

## 2020-10-15 DIAGNOSIS — Z94.4 LIVER TRANSPLANT RECIPIENT (H): ICD-10-CM

## 2020-10-15 DIAGNOSIS — E11.3313 TYPE 2 DIABETES MELLITUS WITH MODERATE NONPROLIFERATIVE RETINOPATHY OF BOTH EYES AND MACULAR EDEMA, UNSPECIFIED WHETHER LONG TERM INSULIN USE (H): ICD-10-CM

## 2020-10-15 PROCEDURE — 92012 INTRM OPH EXAM EST PATIENT: CPT | Performed by: OPHTHALMOLOGY

## 2020-10-15 PROCEDURE — G0463 HOSPITAL OUTPT CLINIC VISIT: HCPCS

## 2020-10-15 PROCEDURE — 92134 CPTRZ OPH DX IMG PST SGM RTA: CPT | Performed by: OPHTHALMOLOGY

## 2020-10-15 RX ORDER — TACROLIMUS 1 MG/1
3 CAPSULE ORAL EVERY 12 HOURS
Qty: 540 CAPSULE | Refills: 3 | Status: SHIPPED | OUTPATIENT
Start: 2020-10-15 | End: 2020-11-12

## 2020-10-15 ASSESSMENT — VISUAL ACUITY
OD_CC+: -3
OS_CC+: -1
OS_CC: 20/40
CORRECTION_TYPE: GLASSES
OD_CC: 20/25
METHOD: SNELLEN - LINEAR

## 2020-10-15 ASSESSMENT — EXTERNAL EXAM - RIGHT EYE: OD_EXAM: NORMAL

## 2020-10-15 ASSESSMENT — CUP TO DISC RATIO
OS_RATIO: 0.3
OD_RATIO: 0.25

## 2020-10-15 ASSESSMENT — TONOMETRY
OD_IOP_MMHG: 18
OS_IOP_MMHG: 17
IOP_METHOD: TONOPEN

## 2020-10-15 ASSESSMENT — REFRACTION_WEARINGRX
OS_SPHERE: -6.75
OS_CYLINDER: +0.50
SPECS_TYPE: PAL
OS_ADD: +2.00
OS_AXIS: 044
OD_ADD: +2.00
OD_AXIS: 131
OD_CYLINDER: +0.75
OD_SPHERE: -7.00

## 2020-10-15 ASSESSMENT — SLIT LAMP EXAM - LIDS
COMMENTS: NORMAL
COMMENTS: SMALL PAPILLOMA ALONG LL MARGIN

## 2020-10-15 ASSESSMENT — CONF VISUAL FIELD
OS_NORMAL: 1
METHOD: COUNTING FINGERS
OD_NORMAL: 1

## 2020-10-15 ASSESSMENT — EXTERNAL EXAM - LEFT EYE: OS_EXAM: NORMAL

## 2020-10-15 NOTE — PROGRESS NOTES
CC:  Follow up Diabetic macular edema  left eye     HPI: Frandy Workman is a  55 year old year-old patient with history of Diabetes mellitus for 24 ys . Patient on insulin.  Patient was evaluated by dr. Amezquita and sent to the retina clinic for management of Diabetic macular edema     Interval History: Patient will be traveling for the next couple of months. last eye examination 3/2020 and received Eylea right eye. Vision is stable if not a little better compared to then. Glasses have been updated and he is happy with his vision.     Hemoglobin A1C   Date Value Ref Range Status   06/13/2020 6.3 (H) 0 - 5.6 % Final     Comment:     Normal <5.7% Prediabetes 5.7-6.4%  Diabetes 6.5% or higher - adopted from ADA   consensus guidelines.         Retinal Imaging:  OCT 10-15-20  RE: mild Diabetic macular edema, stable  LE: increased Diabetic macular edema, mild Epiretinal membrane, stable PAMM inferotemporal to fovea    fluorescein angiography transits right eye 9-5-19  Right eye: diffuse microaneurysms, late mild macular leakage; mild peripheral capillary non perfusion;  mild vessel leakage in late phase, no NVD/NVE  Left eye: diffuse microaneurysms, late mild macula leakage; mild peripheral capillary non perfusion;  mild vessel leakage in late phase, no NVD/NVE    Optos consistent with clinical exam    Assessment & Plan:    0. Status post liver transplant   - tx 11/2020   - severe anemia; s/p transfusion - anemia much improved    - being seen by his primary team    1.  Moderate nonproliferative diabetic retinopathy of both eyes    - HbA1c 6.3% (6/2020)   - exam stable with No NV on exam each eye    - last FA 9/5/19 improved leakage on fluorescein angiography compared to march, 2019   - Blood pressure (<120/80) and blood glucose (HbA1c <7.0, ~6.5 today) control discussed with patient. Patient advised that failure to adequately control each may lead to vision loss. The patient expressed understanding.     2. Diabetic  macular edema both eyes    - status post avastin x 4. Switch to Eylea 4/24/19 with improvement of Diabetic macular edema    - now with slightly increased Diabetic macular edema left eye    - Last Eylea injection was 3/2020    - observe both eye for now         3. Myopia, bilateral  Prescription given today (10/9/19)     4. Senile nuclear sclerosis, bilateral  Comment: Not visually significant OU    5. Dry eye syndrome   Left eye worse than right eye   warm compresses and artificial tears  As needed  -punctal plugs previously left eye - reports this is better     Plan: follow up 6-8 weeks for Optical Coherence Tomography both eyes and fluorescein angiography transits left eye and possible Eylea each eye    ~~~~~~~~~~~~~~~~~~~~~~~~~~~~~~~~~~   Complete documentation of historical and exam elements from today's encounter can be found in the full encounter summary report (not reduplicated in this progress note).  I personally obtained the chief complaint(s) and history of present illness.  I confirmed and edited as necessary the review of systems, past medical/surgical history, family history, social history, and examination findings as documented by others; and I examined the patient myself.  I personally reviewed the relevant tests, images, and reports as documented above.  I personally reviewed the ophthalmic test(s) associated with this encounter, agree with the interpretation(s) as documented by the resident/fellow, and have edited the corresponding report(s) as necessary.   I formulated and edited as necessary the assessment and plan and discussed the findings and management plan with the patient and family    Enriqueta Martin MD   of Ophthalmology.  Retina Service   Department of Ophthalmology and Visual Neurosciences   [facility]  Phone: [phone]   Fax: [phone]

## 2020-10-15 NOTE — NURSING NOTE
Chief Complaints and History of Present Illnesses   Patient presents with     Diabetic Retinopathy Follow Up     8 week follow up nonproliferative diabetic retinopathy of both eyes      Chief Complaint(s) and History of Present Illness(es)     Diabetic Retinopathy Follow Up     Laterality: both eyes    Course: stable    Associated symptoms: floaters and dryness.  Negative for flashes, eye pain, discharge and tearing    Treatments tried: artificial tears    Pain scale: 0/10    Comments: 8 week follow up nonproliferative diabetic retinopathy of both eyes               Comments     Pt denies any significant vision changes in either eye since last visit.  Complains of BE feeling dry occasionally.  Hx of floaters- unchanged.  Denies any flashes, pain, pressure, discharge, and tearing.  Ocular meds: AT's PRN BE  Type 2 DM - checks BS daily, was 143 today  Lab Results       Component                Value               Date                       A1C                      6.3                 06/13/2020                 A1C                      6.6                 08/08/2018                 A1C                      6.5                 06/09/2017                 A1C                      7.8                 10/25/2016              Wendie Reece OT 8:41 AM October 15, 2020

## 2020-10-15 NOTE — TELEPHONE ENCOUNTER
ISSUE:   Tacrolimus IR level 8.8 on 10/14, goal 5-7, dose 4 mg every 12 hours. Appears as 13 hour level    PLAN:   Please call patient and confirm this was an accurate 12-hour trough. Verify Tacrolimus IR dose 4 mg every 12 hours. Confirm no new medications or illness. Confirm no missed doses. If accurate trough and accurate dose, decrease Tacrolimus IR dose to 3 mg every 12 hours and repeat labs in 2 weeks    OUTCOME:   Spoke with patient, they confirm accurate trough level and current dose 4 mg BID. Patient confirmed dose change to 3 mg BID and to repeat labs in 2 weeks. Orders sent to preferred pharmacy for dose change and lab for repeat labs. Patient voiced understanding of plan.     Deena Marshall LPN

## 2020-10-22 ENCOUNTER — OFFICE VISIT (OUTPATIENT)
Dept: ENDOCRINOLOGY | Facility: CLINIC | Age: 55
End: 2020-10-22
Payer: MEDICARE

## 2020-10-22 DIAGNOSIS — E11.22 TYPE 2 DIABETES MELLITUS WITH STAGE 3A CHRONIC KIDNEY DISEASE, WITH LONG-TERM CURRENT USE OF INSULIN (H): Primary | ICD-10-CM

## 2020-10-22 DIAGNOSIS — E11.3293 TYPE 2 DIABETES MELLITUS WITH MILD NONPROLIFERATIVE RETINOPATHY OF BOTH EYES WITHOUT MACULAR EDEMA, UNSPECIFIED WHETHER LONG TERM INSULIN USE (H): ICD-10-CM

## 2020-10-22 DIAGNOSIS — N18.31 TYPE 2 DIABETES MELLITUS WITH STAGE 3A CHRONIC KIDNEY DISEASE, WITH LONG-TERM CURRENT USE OF INSULIN (H): Primary | ICD-10-CM

## 2020-10-22 DIAGNOSIS — L60.2 ONYCHAUXIS: ICD-10-CM

## 2020-10-22 DIAGNOSIS — Z79.4 TYPE 2 DIABETES MELLITUS WITH STAGE 3A CHRONIC KIDNEY DISEASE, WITH LONG-TERM CURRENT USE OF INSULIN (H): Primary | ICD-10-CM

## 2020-10-22 DIAGNOSIS — E11.49 TYPE II OR UNSPECIFIED TYPE DIABETES MELLITUS WITH NEUROLOGICAL MANIFESTATIONS, NOT STATED AS UNCONTROLLED(250.60) (H): ICD-10-CM

## 2020-10-22 PROCEDURE — 11719 TRIM NAIL(S) ANY NUMBER: CPT | Performed by: PODIATRIST

## 2020-10-22 PROCEDURE — 99213 OFFICE O/P EST LOW 20 MIN: CPT | Mod: 25 | Performed by: PODIATRIST

## 2020-10-22 NOTE — LETTER
10/22/2020       RE: Frandy Workman  [address] [facility] [facility] [address]     Dear Colleague,    Thank you for referring your patient, Frandy Workman, to the [facility] [place] [facility]. Please see a copy of my visit note below.    Past Medical History:   Diagnosis Date     Anemia 2013     Arthritis      BPH (benign prostatic hyperplasia)      CAD (coronary artery disease) 4/1/2019     Cholelithiasis      Conductive hearing loss 08/16/2017     Depressive disorder 1986    Suffer effects throughout life     Gastroesophageal reflux disease 12/01/2014     HCC (hepatocellular carcinoma) (H) 1/22/2019     History of diabetic retinopathy 07/2018     HTN (hypertension)      HTN (hypertension) 11/20/2019     Hyperlipidemia      Liver cirrhosis secondary to ESTRADA (H)      Liver transplanted (H) 11/11/2019     Portal vein thrombosis 4/11/2019     Type II diabetes mellitus (H)      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, unspecified whether long term insulin use (H)     Status post coronary angiogram     CAD (coronary artery disease)     Liver transplant recipient (H)     Status post liver transplantation (H)     Immunosuppressed status (H)     HTN (hypertension)     Malnutrition related to chronic disease (H)     Hypophosphatasia     CKD (chronic kidney disease), stage III     Malnutrition (H)     Anemia     Anxiety     Mild recurrent major depression (H)     Low hemoglobin     CKD (chronic kidney disease) stage 4, GFR 15-29 ml/min (H)     Anemia of chronic renal  failure, stage 4 (severe) (H)     Rekha (H)     Past Surgical History:   Procedure Laterality Date     COLONOSCOPY      2015     COLONOSCOPY N/A 12/6/2019    Procedure: COLONOSCOPY, WITH POLYPECTOMY AND BIOPSY;  Surgeon: Adam Morton MD;  Location:  GI     CV HEART CATHETERIZATION WITH POSSIBLE INTERVENTION N/A 2/26/2019    Procedure: CORS;  Surgeon: Jagdish Hoyt MD;  Location:  HEART CARDIAC CATH LAB     ESOPHAGOSCOPY, GASTROSCOPY, DUODENOSCOPY (EGD), COMBINED N/A 11/17/2016    Procedure: COMBINED ESOPHAGOSCOPY, GASTROSCOPY, DUODENOSCOPY (EGD);  Surgeon: Santi Rosas MD;  Location:  GI     ESOPHAGOSCOPY, GASTROSCOPY, DUODENOSCOPY (EGD), COMBINED N/A 11/17/2017    Procedure: COMBINED ESOPHAGOSCOPY, GASTROSCOPY, DUODENOSCOPY (EGD);  EGD;  Surgeon: Santi Rosas MD;  Location:  GI     ESOPHAGOSCOPY, GASTROSCOPY, DUODENOSCOPY (EGD), COMBINED N/A 12/28/2018    Procedure: EGD;  Surgeon: Santi Rosas MD;  Location:  OR     ESOPHAGOSCOPY, GASTROSCOPY, DUODENOSCOPY (EGD), COMBINED N/A 12/6/2019    Procedure: ESOPHAGOGASTRODUODENOSCOPY, WITH BIOPSY;  Surgeon: Adam Morton MD;  Location:  GI     ESOPHAGOSCOPY, GASTROSCOPY, DUODENOSCOPY (EGD), COMBINED N/A 2/13/2020    Procedure: ESOPHAGOGASTRODUODENOSCOPY (EGD);  Surgeon: Santi Rosas MD;  Location:  GI     HEAD & NECK SURGERY      12/2017 at [facility].      IMPLANT GOLD WEIGHT EYELID Right 11/16/2017    Procedure: IMPLANT WEIGHT EYELID;  Right Upper Eyelid Weight, right tarsal strip lower eyelid;  Surgeon: Milana Malave MD;  Location: [facility] OR     IR CHEMO EMBOLIZATION  1/22/2019     KNEE SURGERY Left      ORTHOPEDIC SURGERY       PAROTIDECTOMY, RADICAL NECK DISSECTION Right 11/2/2017    Procedure: PAROTIDECTOMY, RADICAL NECK DISSECTION;  Right Superfacial Parotidectomy , Facial nerve repair. with [facility] facial nerve monitor.;  Surgeon: Asiya Morgan MD;  Location: [facility] OR     PICC INSERTION Left  2017    4fr SL BioFlo PICC, 44cm in the L basilic vein w/ tip in the low SVC     RETURN LIVER TRANSPLANT N/A 2019    Procedure: Exploratory laparotomy, hematoma evacuation, abdominal washout;  Surgeon: Александр Ramos MD;  Location: [facility] OR     TRANSPLANT LIVER RECIPIENT  DONOR N/A 2019    Procedure: TRANSPLANT, LIVER, RECIPIENT,  DONOR;  Surgeon: Александр Ramos MD;  Location: [facility] OR     VASCULAR SURGERY       Social History     Socioeconomic History     Marital status:      Spouse name: Not on file     Number of children: Not on file     Years of education: Not on file     Highest education level: Bachelor's degree (e.g., BA, AB, BS)   Occupational History     Not on file   Social Needs     Financial resource strain: Not very hard     Food insecurity     Worry: Never true     Inability: Never true     Transportation needs     Medical: No     Non-medical: No   Tobacco Use     Smoking status: Former Smoker     Packs/day: 6.00     Years: 30.00     Pack years: 180.00     Types: Cigars     Start date: 2016     Quit date: 10/25/2017     Years since quittin.9     Smokeless tobacco: Former User     Types: Chew     Quit date: 10/31/2017     Tobacco comment: 1 tin per week   Substance and Sexual Activity     Alcohol use: No     Alcohol/week: 0.0 standard drinks     Frequency: Never     Drinks per session: Patient refused     Binge frequency: Never     Comment: quit 1996     Drug use: No     Sexual activity: Not Currently     Partners: Female     Birth control/protection: Condom   Lifestyle     Physical activity     Days per week: 1 day     Minutes per session: 60 min     Stress: To some extent   Relationships     Social connections     Talks on phone: Once a week     Gets together: Once a week     Attends Church service: Never     Active member of club or organization: No     Attends meetings of clubs or organizations: Never     Relationship status:       Intimate partner violence     Fear of current or ex partner: Not on file     Emotionally abused: Not on file     Physically abused: Not on file     Forced sexual activity: Not on file   Other Topics Concern     Parent/sibling w/ CABG, MI or angioplasty before 65F 55M? Yes   Social History Narrative     Not on file     Family History   Problem Relation Age of Onset     Prostate Cancer Maternal Grandfather      Substance Abuse Maternal Grandfather         Alcohol     Colon Cancer Father 60     Pancreatic Cancer Father 60     Prostate Cancer Father      Colorectal Cancer Father      Macular Degeneration Father      Cancer Father      Glaucoma Father      Skin Cancer Father      Colorectal Cancer Maternal Grandmother      Cancer Maternal Grandmother      Substance Abuse Maternal Grandmother         Alcohol     Colorectal Cancer Paternal Grandmother      Cancer Mother      Diabetes Mother          3/2016     Cerebrovascular Disease Mother         Passed away in Feb of this year, 80 years old.     Thyroid Disease Mother      Depression Mother      Asthma Sister         Had since birth     Thyroid Disease Sister      Depression Sister      Liver Disease No family hx of      Melanoma No family hx of      Lab Results   Component Value Date    A1C 6.3 2020    A1C 6.6 2018    A1C 6.5 2017    A1C 7.8 10/25/2016     Lab Results   Component Value Date    WBC 4.9 10/14/2020     Lab Results   Component Value Date    RBC 4.01 10/14/2020     Lab Results   Component Value Date    HGB 13.0 10/14/2020     Lab Results   Component Value Date    HCT 37.7 10/14/2020     No components found for: MCT  Lab Results   Component Value Date    MCV 94 10/14/2020     Lab Results   Component Value Date    MCH 32.4 10/14/2020     Lab Results   Component Value Date    MCHC 34.5 10/14/2020     Lab Results   Component Value Date    RDW 14.1 10/14/2020     Lab Results   Component Value Date     10/14/2020     Last  Comprehensive Metabolic Panel:  Sodium   Date Value Ref Range Status   10/14/2020 139 133 - 144 mmol/L Final     Potassium   Date Value Ref Range Status   10/14/2020 5.4 (H) 3.4 - 5.3 mmol/L Final     Chloride   Date Value Ref Range Status   10/14/2020 108 94 - 109 mmol/L Final     Carbon Dioxide   Date Value Ref Range Status   10/14/2020 27 20 - 32 mmol/L Final     Anion Gap   Date Value Ref Range Status   10/14/2020 4 3 - 14 mmol/L Final     Glucose   Date Value Ref Range Status   10/14/2020 139 (H) 70 - 99 mg/dL Final     Comment:     Non Fasting     Urea Nitrogen   Date Value Ref Range Status   10/14/2020 47 (H) 7 - 30 mg/dL Final     Creatinine   Date Value Ref Range Status   10/14/2020 1.95 (H) 0.66 - 1.25 mg/dL Final     GFR Estimate   Date Value Ref Range Status   10/14/2020 37 (L) >60 mL/min/[1.73_m2] Final     Comment:     Non  GFR Calc  Starting 12/18/2018, serum creatinine based estimated GFR (eGFR) will be   calculated using the Chronic Kidney Disease Epidemiology Collaboration   (CKD-EPI) equation.       Calcium   Date Value Ref Range Status   10/14/2020 9.6 8.5 - 10.1 mg/dL Final     Lab Results   Component Value Date    AST 10 10/14/2020     Lab Results   Component Value Date    ALT 29 10/14/2020     No results found for: BILICONJ   Lab Results   Component Value Date    BILITOTAL 0.7 10/14/2020     Lab Results   Component Value Date    ALBUMIN 4.4 10/14/2020     Lab Results   Component Value Date    PROTTOTAL 8.5 10/14/2020      Lab Results   Component Value Date    ALKPHOS 120 10/14/2020   SUBJECTIVE FINDINGS:  A 55-year-old male returns to clinic for diabetic foot cares.  He relates he is doing well, no ulcers or sores since we have seen him last.  He relates he does have numbness in his feet that is unchanged.  He relates he has Diabetic shoes.  He relates that he needs his toenails cut.  He relates he did not get compression socks and the swelling went away with diuretic use.  He  says he is doing well.      OBJECTIVE FINDINGS:  DP and PT are 2/4 bilaterally.  He has decreased hair growth bilaterally.  He has no peripheral edema bilaterally with some varicosities.  He has incurvated nails with some right hallux nail subungual dried ecchymosis.  This is growing out.  There is no erythema, no drainage, no odor, no calor bilaterally.  No gross tendon voids bilaterally.      ASSESSMENT AND PLAN:  Onychauxis bilaterally.  He has some subungual bruising on the right hallux nail.  That is growing out.  He is diabetic with peripheral neuropathy and vascular disease.  Diagnosis and treatment options discussed with the patient.   All the nails were reduced bilaterally upon consent.  He will return to clinic and see me in about 3 months.       Again, thank you for allowing me to participate in the care of your patient.      Sincerely,    Lamin Gonzalez DPM

## 2020-10-22 NOTE — PROGRESS NOTES
Past Medical History:   Diagnosis Date     Anemia 2013     Arthritis      BPH (benign prostatic hyperplasia)      CAD (coronary artery disease) 4/1/2019     Cholelithiasis      Conductive hearing loss 08/16/2017     Depressive disorder 1986    Suffer effects throughout life     Gastroesophageal reflux disease 12/01/2014     HCC (hepatocellular carcinoma) (H) 1/22/2019     History of diabetic retinopathy 07/2018     HTN (hypertension)      HTN (hypertension) 11/20/2019     Hyperlipidemia      Liver cirrhosis secondary to ESTRADA (H)      Liver transplanted (H) 11/11/2019     Portal vein thrombosis 4/11/2019     Type II diabetes mellitus (H)      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, unspecified whether long term insulin use (H)     Status post coronary angiogram     CAD (coronary artery disease)     Liver transplant recipient (H)     Status post liver transplantation (H)     Immunosuppressed status (H)     HTN (hypertension)     Malnutrition related to chronic disease (H)     Hypophosphatasia     CKD (chronic kidney disease), stage III     Malnutrition (H)     Anemia     Anxiety     Mild recurrent major depression (H)     Low hemoglobin     CKD (chronic kidney disease) stage 4, GFR 15-29 ml/min (H)     Anemia of chronic renal failure, stage 4 (severe) (H)     Rekha (H)     Past Surgical History:   Procedure Laterality Date     COLONOSCOPY      2015     COLONOSCOPY N/A 12/6/2019    Procedure: COLONOSCOPY, WITH POLYPECTOMY AND BIOPSY;  Surgeon: Adam Morton MD;  Location: [facility]     CV HEART CATHETERIZATION WITH POSSIBLE INTERVENTION  N/A 2019    Procedure: CORS;  Surgeon: Jagdish Hoyt MD;  Location:  HEART CARDIAC CATH LAB     ESOPHAGOSCOPY, GASTROSCOPY, DUODENOSCOPY (EGD), COMBINED N/A 2016    Procedure: COMBINED ESOPHAGOSCOPY, GASTROSCOPY, DUODENOSCOPY (EGD);  Surgeon: Santi Rosas MD;  Location:  GI     ESOPHAGOSCOPY, GASTROSCOPY, DUODENOSCOPY (EGD), COMBINED N/A 2017    Procedure: COMBINED ESOPHAGOSCOPY, GASTROSCOPY, DUODENOSCOPY (EGD);  EGD;  Surgeon: Santi Rosas MD;  Location:  GI     ESOPHAGOSCOPY, GASTROSCOPY, DUODENOSCOPY (EGD), COMBINED N/A 2018    Procedure: EGD;  Surgeon: Santi Rosas MD;  Location:  OR     ESOPHAGOSCOPY, GASTROSCOPY, DUODENOSCOPY (EGD), COMBINED N/A 2019    Procedure: ESOPHAGOGASTRODUODENOSCOPY, WITH BIOPSY;  Surgeon: Adam Morton MD;  Location:  GI     ESOPHAGOSCOPY, GASTROSCOPY, DUODENOSCOPY (EGD), COMBINED N/A 2020    Procedure: ESOPHAGOGASTRODUODENOSCOPY (EGD);  Surgeon: Santi Rosas MD;  Location:  GI     HEAD & NECK SURGERY      2017 at [facility].      IMPLANT GOLD WEIGHT EYELID Right 2017    Procedure: IMPLANT WEIGHT EYELID;  Right Upper Eyelid Weight, right tarsal strip lower eyelid;  Surgeon: Milana Malave MD;  Location: [facility] OR     IR CHEMO EMBOLIZATION  2019     KNEE SURGERY Left      ORTHOPEDIC SURGERY       PAROTIDECTOMY, RADICAL NECK DISSECTION Right 2017    Procedure: PAROTIDECTOMY, RADICAL NECK DISSECTION;  Right Superfacial Parotidectomy , Facial nerve repair. with [facility] facial nerve monitor.;  Surgeon: Asiya Morgan MD;  Location: [facility] OR     PICC INSERTION Left 2017    4fr SL BioFlo PICC, 44cm in the L basilic vein w/ tip in the low SVC     RETURN LIVER TRANSPLANT N/A 2019    Procedure: Exploratory laparotomy, hematoma evacuation, abdominal washout;  Surgeon: Александр Ramos MD;  Location: [facility] OR     TRANSPLANT LIVER RECIPIENT  DONOR N/A 2019     Procedure: TRANSPLANT, LIVER, RECIPIENT,  DONOR;  Surgeon: Александр Ramos MD;  Location: [facility] OR     VASCULAR SURGERY       Social History     Socioeconomic History     Marital status:      Spouse name: Not on file     Number of children: Not on file     Years of education: Not on file     Highest education level: Bachelor's degree (e.g., BA, AB, BS)   Occupational History     Not on file   Social Needs     Financial resource strain: Not very hard     Food insecurity     Worry: Never true     Inability: Never true     Transportation needs     Medical: No     Non-medical: No   Tobacco Use     Smoking status: Former Smoker     Packs/day: 6.00     Years: 30.00     Pack years: 180.00     Types: Cigars     Start date: 2016     Quit date: 10/25/2017     Years since quittin.9     Smokeless tobacco: Former User     Types: Chew     Quit date: 10/31/2017     Tobacco comment: 1 tin per week   Substance and Sexual Activity     Alcohol use: No     Alcohol/week: 0.0 standard drinks     Frequency: Never     Drinks per session: Patient refused     Binge frequency: Never     Comment: quit 1996     Drug use: No     Sexual activity: Not Currently     Partners: Female     Birth control/protection: Condom   Lifestyle     Physical activity     Days per week: 1 day     Minutes per session: 60 min     Stress: To some extent   Relationships     Social connections     Talks on phone: Once a week     Gets together: Once a week     Attends Spiritism service: Never     Active member of club or organization: No     Attends meetings of clubs or organizations: Never     Relationship status:      Intimate partner violence     Fear of current or ex partner: Not on file     Emotionally abused: Not on file     Physically abused: Not on file     Forced sexual activity: Not on file   Other Topics Concern     Parent/sibling w/ CABG, MI or angioplasty before 65F 55M? Yes   Social History Narrative     Not on file      Family History   Problem Relation Age of Onset     Prostate Cancer Maternal Grandfather      Substance Abuse Maternal Grandfather         Alcohol     Colon Cancer Father 60     Pancreatic Cancer Father 60     Prostate Cancer Father      Colorectal Cancer Father      Macular Degeneration Father      Cancer Father      Glaucoma Father      Skin Cancer Father      Colorectal Cancer Maternal Grandmother      Cancer Maternal Grandmother      Substance Abuse Maternal Grandmother         Alcohol     Colorectal Cancer Paternal Grandmother      Cancer Mother      Diabetes Mother          3/2016     Cerebrovascular Disease Mother         Passed away in Feb of this year, 80 years old.     Thyroid Disease Mother      Depression Mother      Asthma Sister         Had since birth     Thyroid Disease Sister      Depression Sister      Liver Disease No family hx of      Melanoma No family hx of      Lab Results   Component Value Date    A1C 6.3 2020    A1C 6.6 2018    A1C 6.5 2017    A1C 7.8 10/25/2016     Lab Results   Component Value Date    WBC 4.9 10/14/2020     Lab Results   Component Value Date    RBC 4.01 10/14/2020     Lab Results   Component Value Date    HGB 13.0 10/14/2020     Lab Results   Component Value Date    HCT 37.7 10/14/2020     No components found for: MCT  Lab Results   Component Value Date    MCV 94 10/14/2020     Lab Results   Component Value Date    MCH 32.4 10/14/2020     Lab Results   Component Value Date    MCHC 34.5 10/14/2020     Lab Results   Component Value Date    RDW 14.1 10/14/2020     Lab Results   Component Value Date     10/14/2020     Last Comprehensive Metabolic Panel:  Sodium   Date Value Ref Range Status   10/14/2020 139 133 - 144 mmol/L Final     Potassium   Date Value Ref Range Status   10/14/2020 5.4 (H) 3.4 - 5.3 mmol/L Final     Chloride   Date Value Ref Range Status   10/14/2020 108 94 - 109 mmol/L Final     Carbon Dioxide   Date Value Ref Range  Status   10/14/2020 27 20 - 32 mmol/L Final     Anion Gap   Date Value Ref Range Status   10/14/2020 4 3 - 14 mmol/L Final     Glucose   Date Value Ref Range Status   10/14/2020 139 (H) 70 - 99 mg/dL Final     Comment:     Non Fasting     Urea Nitrogen   Date Value Ref Range Status   10/14/2020 47 (H) 7 - 30 mg/dL Final     Creatinine   Date Value Ref Range Status   10/14/2020 1.95 (H) 0.66 - 1.25 mg/dL Final     GFR Estimate   Date Value Ref Range Status   10/14/2020 37 (L) >60 mL/min/[1.73_m2] Final     Comment:     Non  GFR Calc  Starting 12/18/2018, serum creatinine based estimated GFR (eGFR) will be   calculated using the Chronic Kidney Disease Epidemiology Collaboration   (CKD-EPI) equation.       Calcium   Date Value Ref Range Status   10/14/2020 9.6 8.5 - 10.1 mg/dL Final     Lab Results   Component Value Date    AST 10 10/14/2020     Lab Results   Component Value Date    ALT 29 10/14/2020     No results found for: BILICONJ   Lab Results   Component Value Date    BILITOTAL 0.7 10/14/2020     Lab Results   Component Value Date    ALBUMIN 4.4 10/14/2020     Lab Results   Component Value Date    PROTTOTAL 8.5 10/14/2020      Lab Results   Component Value Date    ALKPHOS 120 10/14/2020   SUBJECTIVE FINDINGS:  A 55-year-old male returns to clinic for diabetic foot cares.  He relates he is doing well, no ulcers or sores since we have seen him last.  He relates he does have numbness in his feet that is unchanged.  He relates he has Diabetic shoes.  He relates that he needs his toenails cut.  He relates he did not get compression socks and the swelling went away with diuretic use.  He says he is doing well.      OBJECTIVE FINDINGS:  DP and PT are 2/4 bilaterally.  He has decreased hair growth bilaterally.  He has no peripheral edema bilaterally with some varicosities.  He has incurvated nails with some right hallux nail subungual dried ecchymosis.  This is growing out.  There is no erythema, no  drainage, no odor, no calor bilaterally.  No gross tendon voids bilaterally.      ASSESSMENT AND PLAN:  Onychauxis bilaterally.  He has some subungual bruising on the right hallux nail.  That is growing out.  He is diabetic with peripheral neuropathy and vascular disease.  Diagnosis and treatment options discussed with the patient.   All the nails were reduced bilaterally upon consent.  He will return to clinic and see me in about 3 months.

## 2020-10-26 NOTE — PROGRESS NOTES
"Outcome for 10/26/20 11:20 AM     Frandy Workman is a 55 year old male who is being evaluated via a billable video visit.      The patient has been notified of following:     \"This video visit will be conducted via a call between you and your physician/provider. We have found that certain health care needs can be provided without the need for an in-person physical exam.  This service lets us provide the care you need with a video conversation.  If a prescription is necessary we can send it directly to your pharmacy.  If lab work is needed we can place an order for that and you can then stop by our lab to have the test done at a later time.    Video visits are billed at different rates depending on your insurance coverage.  Please reach out to your insurance provider with any questions.    If during the course of the call the physician/provider feels a video visit is not appropriate, you will not be charged for this service.\"    Patient has given verbal consent for Video visit? Yes  How would you like to obtain your AVS? MyChart  If you are dropped from the video visit, the video invite should be resent to: Text to cell phone: [phone]  Will anyone else be joining your video visit? No        Video-Visit Details    Type of service:  Video Visit    Video Start Time: 11:01 AM  Video End Time: 11:44 AM    Originating Location (pt. Location): Home    Distant Location (provider location):  [facility] [place]     Platform used for Video Visit: [employer]         Diabetes Virtual Consult Note  Tish Toscano PA-C  October 27, 2020    Frandy Workman is a 55 year old male with a past medical history including  has a past medical history of Anemia (2013), Arthritis, BPH (benign prostatic hyperplasia), CAD (coronary artery disease) (4/1/2019), Cholelithiasis, Conductive hearing loss (08/16/2017), Depressive disorder (1986), Gastroesophageal reflux disease (12/01/2014), HCC (hepatocellular " carcinoma) (H) (2019), History of diabetic retinopathy (2018), HTN (hypertension), HTN (hypertension) (2019), Hyperlipidemia, Liver cirrhosis secondary to ESTRADA (H), Liver transplanted (H) (2019), Portal vein thrombosis (2019), and Type II diabetes mellitus (H). He also has no past medical history of Asymptomatic human immunodeficiency virus (HIV) infection status (H), Cerebral infarction (H), Congestive heart failure (H), COPD (chronic obstructive pulmonary disease) (H), History of blood transfusion, Infectious mononucleosis, PONV (postoperative nausea and vomiting), Thyroid disease, Tuberculosis, or Uncomplicated asthma.    When I last spoke to [facility] in late September he continued to have high BG  after having started Abilify over the summer and becoming more l sedentary, tired, lethargic, hungry, sleeping more since starting the medication with weight increasing to 172 lbs and average BG increased to 231 with high BG overnight.  We increased Tresiba to 27 units daily, carbohydrate coverage to 1 unit : 15 g of carbohydrate, continued 1unit Novolo mg /dl >150, and added  Empagliflozin 10 mg daily.      Interim:    He feels Jardiance is helpful, he is going to the bathroom more at night.  Was once /night now 2-3 /night.  He does awake at night and snack at night and does take insulin to cover that.  He falls back asleep easily so it doesn't bother him much.      He thinks he probably doesn't get enough calorie during the day.  He eats breakfast and dinner; he doesn't snack.    Dr Gonzalez trimmed nails advised monitoring closely only at this time.  No lesions.  He is checking.    Seeing eye, will need cataract surgery within the year.  Wonders about what it is.      He was more active, hasn't been on it much lately.      He feels his weight is a bit heavy.  He was 190 prior to transplant, told would lose and then come back up.  Wonders what a good weight for him is.      Dm Regimen:   -  Tresiba 27 units /day.   -  carbohydrate coverage to 1 unit : 15 g of carbohydrate.   -  Correction Novolounit Novolo mg /dl >150     - Empagliflozin 10 mg daily.      We reviewed glucometer, pump and CGMS data together.  It revealed:        History of Diabetes monitoring and complications/ prevention:  CAD: yes 2019  Last eye exam results:20  - referred to retina specialists - considering injection.    Microalbuminuria: yes - has CKD, not currently on Ace or ARB, sees Cardiology , Neprhology  HTN: past  On statin: yes  On ASA:yes  Depression:yes  - sees psych  ED:did not inquire    Frandy's PMH reviewed with him.      Current Outpatient Medications   Medication     Acetaminophen (TYLENOL) 325 MG CAPS     ARIPiprazole (ABILIFY) 5 MG tablet     Artificial Tear Solution (SM ARTIFICIAL TEARS) SOLN     aspirin 81 MG EC tablet     BD VIKTORIA U/F 32G X 4 MM insulin pen needle     ciclopirox (LOPROX) 0.77 % cream     Continuous Blood Gluc  (FREESTYLE CLAUDIA 14 DAY READER) CECILIO     Continuous Blood Gluc Sensor (FREESTYLE CLAUDIA 14 DAY SENSOR) MISC     empagliflozin (JARDIANCE) 10 MG TABS tablet     ferrous sulfate (FEROSUL) 325 (65 Fe) MG tablet     glucose (BD GLUCOSE) 4 g chewable tablet     insulin aspart (NOVOLOG PEN) 100 UNIT/ML pen     insulin degludec (TRESIBA FLEXTOUCH) 100 UNIT/ML pen     lamiVUDine (EPIVIR) 100 MG tablet     magnesium oxide (MAG-OX) 400 MG tablet     Multiple Vitamin (THERAVITE PO)     order for DME     pantoprazole (PROTONIX) 40 MG EC tablet     polyethylene glycol (MIRALAX) 17 g packet     rosuvastatin (CRESTOR) 5 MG tablet     tacrolimus (GENERIC EQUIVALENT) 1 MG capsule     tamsulosin (FLOMAX) 0.4 MG capsule     vitamin B-12 (CYANOCOBALAMIN) 1000 MCG tablet     vitamin D3 (CHOLECALCIFEROL) 2000 units (50 mcg) tablet     Current Facility-Administered Medications   Medication     Aflibercept (EYLEA) injection 2 mg              Frandy's family history includes Asthma in his  "sister; Cancer in his father, maternal grandmother, and mother; Cerebrovascular Disease in his mother; Colon Cancer (age of onset: 60) in his father; Colorectal Cancer in his father, maternal grandmother, and paternal grandmother; Depression in his mother and sister; Diabetes in his mother; Glaucoma in his father; Macular Degeneration in his father; Pancreatic Cancer (age of onset: 60) in his father; Prostate Cancer in his father and maternal grandfather; Skin Cancer in his father; Substance Abuse in his maternal grandfather and maternal grandmother; Thyroid Disease in his mother and sister.    ROS:   Patient denies any fevers, chills or sweats as well as any changes in or problems with vision, pain or problems with dentition, new or different headaches.  Patient denies symptoms of hypo and hyperglycemia except as above.   Patients denies marked fatigue, cough, shortness of breath, chest pain or pressure.  There has been no pain with or other changes in urination or  itching or pain in genital areas.  Patient denies any noted swelling in feet, ankles or otherwise, loss of sensation or pain  in feet or other areas.    Patient also denies current difficulties with depressed mood, anhedonia or worrying too much.        Exam:    PHONE VISIT        Wt Readings from Last 10 Encounters:   10/14/20 78 kg (171 lb 14.4 oz)   09/30/20 78.8 kg (173 lb 12.8 oz)   09/25/20 (P) 79 kg (174 lb 1.6 oz)   09/16/20 77.6 kg (171 lb)   08/19/20 76.9 kg (169 lb 8 oz)   08/12/20 76.8 kg (169 lb 4.8 oz)   07/29/20 75.6 kg (166 lb 9.6 oz)   07/28/20 75.8 kg (167 lb)   07/15/20 73.4 kg (161 lb 12.8 oz)   07/01/20 70.3 kg (155 lb)         Frandy is alerted and oriented.  Mood is \"good,\" affect is congruent.  Thoughtful form and content are fluid and coherent.  No signs of distress are appreciated.      Data:      Most recent:  Lab Results   Component Value Date    CR 1.95 10/14/2020     Lab Results   Component Value Date     10/14/2020 "       GFR Estimate   Date Value Ref Range Status   10/14/2020 37 (L) >60 mL/min/[1.73_m2] Final     Comment:     Non  GFR Calc  Starting 12/18/2018, serum creatinine based estimated GFR (eGFR) will be   calculated using the Chronic Kidney Disease Epidemiology Collaboration   (CKD-EPI) equation.     09/25/2020 52 (L) >60 mL/min/[1.73_m2] Final     Comment:     Non  GFR Calc  Starting 12/18/2018, serum creatinine based estimated GFR (eGFR) will be   calculated using the Chronic Kidney Disease Epidemiology Collaboration   (CKD-EPI) equation.     09/16/2020 47 (L) >60 mL/min/[1.73_m2] Final     Comment:     Non  GFR Calc  Starting 12/18/2018, serum creatinine based estimated GFR (eGFR) will be   calculated using the Chronic Kidney Disease Epidemiology Collaboration   (CKD-EPI) equation.       GFR Estimate If Black   Date Value Ref Range Status   10/14/2020 43 (L) >60 mL/min/[1.73_m2] Final     Comment:      GFR Calc  Starting 12/18/2018, serum creatinine based estimated GFR (eGFR) will be   calculated using the Chronic Kidney Disease Epidemiology Collaboration   (CKD-EPI) equation.     09/25/2020 60 (L) >60 mL/min/[1.73_m2] Final     Comment:      GFR Calc  Starting 12/18/2018, serum creatinine based estimated GFR (eGFR) will be   calculated using the Chronic Kidney Disease Epidemiology Collaboration   (CKD-EPI) equation.     09/16/2020 55 (L) >60 mL/min/[1.73_m2] Final     Comment:      GFR Calc  Starting 12/18/2018, serum creatinine based estimated GFR (eGFR) will be   calculated using the Chronic Kidney Disease Epidemiology Collaboration   (CKD-EPI) equation.           Lab Results   Component Value Date    A1C 6.3 (H) 06/13/2020    A1C 6.6 (H) 08/08/2018    A1C 6.5 (H) 06/09/2017    A1C 7.8 (H) 10/25/2016    HEMOGLOBINA1 4.8 03/04/2020    HEMOGLOBINA1 5.1 12/02/2019    HEMOGLOBINA1 5.4 08/14/2019    HEMOGLOBINA1 6.1 (A)  02/11/2019    HEMOGLOBINA1 8.1 (A) 04/11/2018     Lab Results   Component Value Date    MICROL 196 06/29/2020     No results found for: MICROALBUMIN  No results found for: CPEPT, GADAB, ISCAB  Cholesterol   Date Value Ref Range Status   09/25/2020 146 <200 mg/dL Final   07/29/2020 192 <200 mg/dL Final     HDL Cholesterol   Date Value Ref Range Status   09/25/2020 39 (L) >39 mg/dL Final   07/29/2020 39 (L) >39 mg/dL Final     LDL Cholesterol Calculated   Date Value Ref Range Status   09/25/2020 61 <100 mg/dL Final     Comment:     Desirable:       <100 mg/dl   07/29/2020 117 (H) <100 mg/dL Final     Comment:     Above desirable:  100-129 mg/dl  Borderline High:  130-159 mg/dL  High:             160-189 mg/dL  Very high:       >189 mg/dl       Triglycerides   Date Value Ref Range Status   09/25/2020 228 (H) <150 mg/dL Final     Comment:     Borderline high:  150-199 mg/dl  High:             200-499 mg/dl  Very high:       >499 mg/dl     07/29/2020 181 (H) <150 mg/dL Final     Comment:     Borderline high:  150-199 mg/dl  High:             200-499 mg/dl  Very high:       >499 mg/dl       No results found for: CHOLHDLRATIO                Assessment/Plan:    Frandy is a 55 year old male with  has a past medical history of Anemia (2013), Arthritis, BPH (benign prostatic hyperplasia), CAD (coronary artery disease) (4/1/2019), Cholelithiasis, Conductive hearing loss (08/16/2017), Depressive disorder (1986), Gastroesophageal reflux disease (12/01/2014), HCC (hepatocellular carcinoma) (H) (1/22/2019), History of diabetic retinopathy (07/2018), HTN (hypertension), HTN (hypertension) (11/20/2019), Hyperlipidemia, Liver cirrhosis secondary to ESTRAAD (H), Liver transplanted (H) (11/11/2019), Portal vein thrombosis (4/11/2019), and Type II diabetes mellitus (H). He also has no past medical history of Asymptomatic human immunodeficiency virus (HIV) infection status (H), Cerebral infarction (H), Congestive heart failure (H), COPD  (chronic obstructive pulmonary disease) (H), History of blood transfusion, Infectious mononucleosis, PONV (postoperative nausea and vomiting), Thyroid disease, Tuberculosis, or Uncomplicated asthma.      DM2, well managed.    Recommend continue:     - Tresiba 27 units /day.   -  carbohydrate coverage to 1 unit : 15 g of carbohydrate.   -  Correction Novolounit Novolo mg /dl >150     - Empagliflozin 10 mg daily.  Add exercise early in day.  Reccomend a mix of weight bearing and aerobic exercise to meet ADA recommendations.      Consider nutrition heart healthy diet, weight maintenance diet.      2. DM Complications-     Lifestyle modification focusing on application of a Mediterranean diet or Dietary Approaches to Stop Hypertension (DASH) dietary pattern; reduction of saturated fat and trans fat; increase of dietary n-3 fatty acids, viscous fiber, and plant stanols/sterols intake; and increased physical activity recommended to improve the lipid profile and reduce the risk of developing atherosclerotic cardiovascular disease.     Retinopathy:  Yes.  Seeing retina specialist.    Nephropathy:  No current elevated BP.  Creatinine stable. Consider ACe I / ARB  Neuropathy: No.    Feet: OK, no ulcers or lesions.   Lipids:   Patient taking a statin.        >50% of 30 minute visit spent in counseling, education and coordination of care related to healthy lifestyle, prevention of diabetic complications, options for better glycemic control as well as preventing, detecting, and treating hypoglycemia.      It is my privilege to be involved in the care of the above patient.     Tish Toscano PA-C, MPAS  [facility]  Diabetes, Endocrinology, and Metabolism  [phone] Appointments/Nurse  [phone] pager  [phone] nurse line    This note was completed in part using Dragon voice recognition, and may contain word and grammatical errors.

## 2020-10-27 ENCOUNTER — VIRTUAL VISIT (OUTPATIENT)
Dept: ENDOCRINOLOGY | Facility: CLINIC | Age: 55
End: 2020-10-27
Payer: MEDICARE

## 2020-10-27 ENCOUNTER — VIRTUAL VISIT (OUTPATIENT)
Dept: BEHAVIORAL HEALTH | Facility: CLINIC | Age: 55
End: 2020-10-27
Payer: MEDICARE

## 2020-10-27 DIAGNOSIS — N18.31 TYPE 2 DIABETES MELLITUS WITH STAGE 3A CHRONIC KIDNEY DISEASE, WITH LONG-TERM CURRENT USE OF INSULIN (H): Primary | ICD-10-CM

## 2020-10-27 DIAGNOSIS — F33.0 MILD RECURRENT MAJOR DEPRESSION (H): Primary | ICD-10-CM

## 2020-10-27 DIAGNOSIS — E11.22 TYPE 2 DIABETES MELLITUS WITH STAGE 3A CHRONIC KIDNEY DISEASE, WITH LONG-TERM CURRENT USE OF INSULIN (H): Primary | ICD-10-CM

## 2020-10-27 DIAGNOSIS — F41.9 ANXIETY: ICD-10-CM

## 2020-10-27 DIAGNOSIS — Z79.4 TYPE 2 DIABETES MELLITUS WITH STAGE 3A CHRONIC KIDNEY DISEASE, WITH LONG-TERM CURRENT USE OF INSULIN (H): Primary | ICD-10-CM

## 2020-10-27 DIAGNOSIS — Z94.4 LIVER TRANSPLANT STATUS (H): ICD-10-CM

## 2020-10-27 PROCEDURE — 99214 OFFICE O/P EST MOD 30 MIN: CPT | Mod: 95 | Performed by: PHYSICIAN ASSISTANT

## 2020-10-27 PROCEDURE — 90834 PSYTX W PT 45 MINUTES: CPT | Mod: 95 | Performed by: PSYCHOLOGIST

## 2020-10-27 NOTE — LETTER
"10/27/2020       RE: Frandy Workman  [address] [facility] [address]     Dear Colleague,    Thank you for referring your patient, Frandy Workman, to the [facility] [place] [facility]. Please see a copy of my visit note below.    Outcome for 10/26/20 11:20 AM     Frandy Workman is a 55 year old male who is being evaluated via a billable video visit.      The patient has been notified of following:     \"This video visit will be conducted via a call between you and your physician/provider. We have found that certain health care needs can be provided without the need for an in-person physical exam.  This service lets us provide the care you need with a video conversation.  If a prescription is necessary we can send it directly to your pharmacy.  If lab work is needed we can place an order for that and you can then stop by our lab to have the test done at a later time.    Video visits are billed at different rates depending on your insurance coverage.  Please reach out to your insurance provider with any questions.    If during the course of the call the physician/provider feels a video visit is not appropriate, you will not be charged for this service.\"    Patient has given verbal consent for Video visit? Yes  How would you like to obtain your AVS? MyChart  If you are dropped from the video visit, the video invite should be resent to: Text to cell phone: [phone]  Will anyone else be joining your video visit? No        Video-Visit Details    Type of service:  Video Visit    Video Start Time: 11:01 AM  Video End Time: 11:44 AM    Originating Location (pt. Location): Home    Distant Location (provider location):  [facility]     Platform used for Video Visit: PicApp         Diabetes Virtual Consult Note  Tish Toscano PA-C  October 27, 2020    Frandy Workman is a 55 year old male with a past " medical history including  has a past medical history of Anemia (), Arthritis, BPH (benign prostatic hyperplasia), CAD (coronary artery disease) (2019), Cholelithiasis, Conductive hearing loss (2017), Depressive disorder (), Gastroesophageal reflux disease (2014), HCC (hepatocellular carcinoma) (H) (2019), History of diabetic retinopathy (2018), HTN (hypertension), HTN (hypertension) (2019), Hyperlipidemia, Liver cirrhosis secondary to ESTRADA (H), Liver transplanted (H) (2019), Portal vein thrombosis (2019), and Type II diabetes mellitus (H). He also has no past medical history of Asymptomatic human immunodeficiency virus (HIV) infection status (H), Cerebral infarction (H), Congestive heart failure (H), COPD (chronic obstructive pulmonary disease) (H), History of blood transfusion, Infectious mononucleosis, PONV (postoperative nausea and vomiting), Thyroid disease, Tuberculosis, or Uncomplicated asthma.    When I last spoke to [facility] in late September he continued to have high BG  after having started Abilify over the summer and becoming more l sedentary, tired, lethargic, hungry, sleeping more since starting the medication with weight increasing to 172 lbs and average BG increased to 231 with high BG overnight.  We increased Tresiba to 27 units daily, carbohydrate coverage to 1 unit : 15 g of carbohydrate, continued 1unit Novolo mg /dl >150, and added  Empagliflozin 10 mg daily.      Interim:    He feels Jardiance is helpful, he is going to the bathroom more at night.  Was once /night now 2-3 /night.  He does awake at night and snack at night and does take insulin to cover that.  He falls back asleep easily so it doesn't bother him much.      He thinks he probably doesn't get enough calorie during the day.  He eats breakfast and dinner; he doesn't snack.    Dr Gonzalez trimmed nails advised monitoring closely only at this time.  No lesions.  He is checking.    Seeing  eye, will need cataract surgery within the year.  Wonders about what it is.      He was more active, hasn't been on it much lately.      He feels his weight is a bit heavy.  He was 190 prior to transplant, told would lose and then come back up.  Wonders what a good weight for him is.      Dm Regimen:   - Tresiba 27 units /day.   -  carbohydrate coverage to 1 unit : 15 g of carbohydrate.   -  Correction Novolounit Novolo mg /dl >150     - Empagliflozin 10 mg daily.      We reviewed glucometer, pump and CGMS data together.  It revealed:        History of Diabetes monitoring and complications/ prevention:  CAD: yes 2019  Last eye exam results:20  - referred to retina specialists - considering injection.    Microalbuminuria: yes - has CKD, not currently on Ace or ARB, sees Cardiology , Neprhology  HTN: past  On statin: yes  On ASA:yes  Depression:yes  - sees psych  ED:did not inquire    Frandy's PMH reviewed with him.      Current Outpatient Medications   Medication     Acetaminophen (TYLENOL) 325 MG CAPS     ARIPiprazole (ABILIFY) 5 MG tablet     Artificial Tear Solution (SM ARTIFICIAL TEARS) SOLN     aspirin 81 MG EC tablet     BD VIKTORIA U/F 32G X 4 MM insulin pen needle     ciclopirox (LOPROX) 0.77 % cream     Continuous Blood Gluc  (FREESTYLE CLAUDIA 14 DAY READER) CECILIO     Continuous Blood Gluc Sensor (FREESTYLE CLAUDIA 14 DAY SENSOR) MISC     empagliflozin (JARDIANCE) 10 MG TABS tablet     ferrous sulfate (FEROSUL) 325 (65 Fe) MG tablet     glucose (BD GLUCOSE) 4 g chewable tablet     insulin aspart (NOVOLOG PEN) 100 UNIT/ML pen     insulin degludec (TRESIBA FLEXTOUCH) 100 UNIT/ML pen     lamiVUDine (EPIVIR) 100 MG tablet     magnesium oxide (MAG-OX) 400 MG tablet     Multiple Vitamin (THERAVITE PO)     order for DME     pantoprazole (PROTONIX) 40 MG EC tablet     polyethylene glycol (MIRALAX) 17 g packet     rosuvastatin (CRESTOR) 5 MG tablet     tacrolimus (GENERIC EQUIVALENT) 1 MG capsule      tamsulosin (FLOMAX) 0.4 MG capsule     vitamin B-12 (CYANOCOBALAMIN) 1000 MCG tablet     vitamin D3 (CHOLECALCIFEROL) 2000 units (50 mcg) tablet     Current Facility-Administered Medications   Medication     Aflibercept (EYLEA) injection 2 mg              Frandy's family history includes Asthma in his sister; Cancer in his father, maternal grandmother, and mother; Cerebrovascular Disease in his mother; Colon Cancer (age of onset: 60) in his father; Colorectal Cancer in his father, maternal grandmother, and paternal grandmother; Depression in his mother and sister; Diabetes in his mother; Glaucoma in his father; Macular Degeneration in his father; Pancreatic Cancer (age of onset: 60) in his father; Prostate Cancer in his father and maternal grandfather; Skin Cancer in his father; Substance Abuse in his maternal grandfather and maternal grandmother; Thyroid Disease in his mother and sister.    ROS:   Patient denies any fevers, chills or sweats as well as any changes in or problems with vision, pain or problems with dentition, new or different headaches.  Patient denies symptoms of hypo and hyperglycemia except as above.   Patients denies marked fatigue, cough, shortness of breath, chest pain or pressure.  There has been no pain with or other changes in urination or  itching or pain in genital areas.  Patient denies any noted swelling in feet, ankles or otherwise, loss of sensation or pain  in feet or other areas.    Patient also denies current difficulties with depressed mood, anhedonia or worrying too much.        Exam:    PHONE VISIT        Wt Readings from Last 10 Encounters:   10/14/20 78 kg (171 lb 14.4 oz)   09/30/20 78.8 kg (173 lb 12.8 oz)   09/25/20 (P) 79 kg (174 lb 1.6 oz)   09/16/20 77.6 kg (171 lb)   08/19/20 76.9 kg (169 lb 8 oz)   08/12/20 76.8 kg (169 lb 4.8 oz)   07/29/20 75.6 kg (166 lb 9.6 oz)   07/28/20 75.8 kg (167 lb)   07/15/20 73.4 kg (161 lb 12.8 oz)   07/01/20 70.3 kg (155 lb)         Frandy  "is alerted and oriented.  Mood is \"good,\" affect is congruent.  Thoughtful form and content are fluid and coherent.  No signs of distress are appreciated.      Data:      Most recent:  Lab Results   Component Value Date    CR 1.95 10/14/2020     Lab Results   Component Value Date     10/14/2020       GFR Estimate   Date Value Ref Range Status   10/14/2020 37 (L) >60 mL/min/[1.73_m2] Final     Comment:     Non  GFR Calc  Starting 12/18/2018, serum creatinine based estimated GFR (eGFR) will be   calculated using the Chronic Kidney Disease Epidemiology Collaboration   (CKD-EPI) equation.     09/25/2020 52 (L) >60 mL/min/[1.73_m2] Final     Comment:     Non  GFR Calc  Starting 12/18/2018, serum creatinine based estimated GFR (eGFR) will be   calculated using the Chronic Kidney Disease Epidemiology Collaboration   (CKD-EPI) equation.     09/16/2020 47 (L) >60 mL/min/[1.73_m2] Final     Comment:     Non  GFR Calc  Starting 12/18/2018, serum creatinine based estimated GFR (eGFR) will be   calculated using the Chronic Kidney Disease Epidemiology Collaboration   (CKD-EPI) equation.       GFR Estimate If Black   Date Value Ref Range Status   10/14/2020 43 (L) >60 mL/min/[1.73_m2] Final     Comment:      GFR Calc  Starting 12/18/2018, serum creatinine based estimated GFR (eGFR) will be   calculated using the Chronic Kidney Disease Epidemiology Collaboration   (CKD-EPI) equation.     09/25/2020 60 (L) >60 mL/min/[1.73_m2] Final     Comment:      GFR Calc  Starting 12/18/2018, serum creatinine based estimated GFR (eGFR) will be   calculated using the Chronic Kidney Disease Epidemiology Collaboration   (CKD-EPI) equation.     09/16/2020 55 (L) >60 mL/min/[1.73_m2] Final     Comment:      GFR Calc  Starting 12/18/2018, serum creatinine based estimated GFR (eGFR) will be   calculated using the Chronic Kidney Disease Epidemiology " Collaboration   (CKD-EPI) equation.           Lab Results   Component Value Date    A1C 6.3 (H) 06/13/2020    A1C 6.6 (H) 08/08/2018    A1C 6.5 (H) 06/09/2017    A1C 7.8 (H) 10/25/2016    HEMOGLOBINA1 4.8 03/04/2020    HEMOGLOBINA1 5.1 12/02/2019    HEMOGLOBINA1 5.4 08/14/2019    HEMOGLOBINA1 6.1 (A) 02/11/2019    HEMOGLOBINA1 8.1 (A) 04/11/2018     Lab Results   Component Value Date    MICROL 196 06/29/2020     No results found for: MICROALBUMIN  No results found for: CPEPT, GADAB, ISCAB  Cholesterol   Date Value Ref Range Status   09/25/2020 146 <200 mg/dL Final   07/29/2020 192 <200 mg/dL Final     HDL Cholesterol   Date Value Ref Range Status   09/25/2020 39 (L) >39 mg/dL Final   07/29/2020 39 (L) >39 mg/dL Final     LDL Cholesterol Calculated   Date Value Ref Range Status   09/25/2020 61 <100 mg/dL Final     Comment:     Desirable:       <100 mg/dl   07/29/2020 117 (H) <100 mg/dL Final     Comment:     Above desirable:  100-129 mg/dl  Borderline High:  130-159 mg/dL  High:             160-189 mg/dL  Very high:       >189 mg/dl       Triglycerides   Date Value Ref Range Status   09/25/2020 228 (H) <150 mg/dL Final     Comment:     Borderline high:  150-199 mg/dl  High:             200-499 mg/dl  Very high:       >499 mg/dl     07/29/2020 181 (H) <150 mg/dL Final     Comment:     Borderline high:  150-199 mg/dl  High:             200-499 mg/dl  Very high:       >499 mg/dl       No results found for: CHOLHDLRATIO                Assessment/Plan:    Frandy is a 55 year old male with  has a past medical history of Anemia (2013), Arthritis, BPH (benign prostatic hyperplasia), CAD (coronary artery disease) (4/1/2019), Cholelithiasis, Conductive hearing loss (08/16/2017), Depressive disorder (1986), Gastroesophageal reflux disease (12/01/2014), HCC (hepatocellular carcinoma) (H) (1/22/2019), History of diabetic retinopathy (07/2018), HTN (hypertension), HTN (hypertension) (11/20/2019), Hyperlipidemia, Liver cirrhosis  secondary to ESTRADA (H), Liver transplanted (H) (2019), Portal vein thrombosis (2019), and Type II diabetes mellitus (H). He also has no past medical history of Asymptomatic human immunodeficiency virus (HIV) infection status (H), Cerebral infarction (H), Congestive heart failure (H), COPD (chronic obstructive pulmonary disease) (H), History of blood transfusion, Infectious mononucleosis, PONV (postoperative nausea and vomiting), Thyroid disease, Tuberculosis, or Uncomplicated asthma.      DM2, well managed.    Recommend continue:     - Tresiba 27 units /day.   -  carbohydrate coverage to 1 unit : 15 g of carbohydrate.   -  Correction Novolounit Novolo mg /dl >150     - Empagliflozin 10 mg daily.  Add exercise early in day.  Reccomend a mix of weight bearing and aerobic exercise to meet ADA recommendations.      Consider nutrition heart healthy diet, weight maintenance diet.      2. DM Complications-     Lifestyle modification focusing on application of a Mediterranean diet or Dietary Approaches to Stop Hypertension (DASH) dietary pattern; reduction of saturated fat and trans fat; increase of dietary n-3 fatty acids, viscous fiber, and plant stanols/sterols intake; and increased physical activity recommended to improve the lipid profile and reduce the risk of developing atherosclerotic cardiovascular disease.     Retinopathy:  Yes.  Seeing retina specialist.    Nephropathy:  No current elevated BP.  Creatinine stable. Consider ACe I / ARB  Neuropathy: No.    Feet: OK, no ulcers or lesions.   Lipids:   Patient taking a statin.        >50% of 30 minute visit spent in counseling, education and coordination of care related to healthy lifestyle, prevention of diabetic complications, options for better glycemic control as well as preventing, detecting, and treating hypoglycemia.      It is my privilege to be involved in the care of the above patient.     Tish Toscano PA-C, MPAS  [facility]  [place]  Diabetes, Endocrinology, and Metabolism  [phone] Appointments/Nurse  [phone] pager  [phone] nurse line    This note was completed in part using Dragon voice recognition, and may contain word and grammatical errors.        Tish Toscano PA-C

## 2020-10-27 NOTE — PATIENT INSTRUCTIONS
"  Breathing Tips to Help You Relax  [facility]  How can breathing help me relax?  When we are have strong emotions, our bodies can tense up, and our breathing can change to short, quick breaths. Sometimes, we hold our breath without even thinking about it. Taking slow, deep breaths can help us calm down and feel more balanced.   How do I do it?  Start by tensing different muscle groups as you breathe in through your nose. Then, gradually relax them as you breathe out (exhale) For example:   1. Legs and feet: Point or flex your toes. Breathe in as you tighten the muscles in your legs. Relax these muscles as you breathe out slowly.  2. Arms: Breathe in as you tighten your arm muscles. Pretend you're squeezing [place] with your hands. Relax your muscles as you breathe out slowly.  3. Face: Breathe in as you tighten the muscles of your face. Relax your muscles as you breathe out slowly.  4. Breathe out through pursed lips, like you are blowing out a candle. Notice how the air feels on the tip of your nose, and then expands your belly and ribs. Try sensing your shoulders move up--like you're filling a cup. Focus on allowing the breath to flow, instead of forcing it.  Tips to become the most relaxed:    Imagine the breath first emptying from your belly, then the lung and rib area, and then the upper chest. It helps to count and draw the number out through the entire exhale (for example, \"onnnnnnnnneeeeeee\").    Try to let all the air out when you breathe out. Your out breath (exhale) should be about twice as long as your in breath (inhale).    Close your eyes, or try watching your belly rise and fall. You can also focus on something in the room or a word picture from your imagination.    You can breathe loudly (like you're trying to fog up a mirror) or silently.    Notice how your breath feels as you inhale and exhale through your nose, mouth or both.    It may help to place a hand over your belly, upper " chest area or any part of your body that is tense.    If your mind wanders, bring it back to what you are focusing on. Be kind to yourself--this breathing technique takes practice.    Set a time each day to practice. Work up to longer periods of time, if you can. Even brief periods of regular practice can improve mood and brain function.  For informational purposes only. Not to replace the advice of your health care provider.   Copyright   2013 [employer]. All rights reserved. [employer] 734332 - Rev 02/16.  For informational purposes only. Not to replace the advice of your health care provider.  Copyright   2018 [employer]. All rights reserved.      Deep Diaphragmatic Breathing  Each of us breathes several times every minute of every day. Unless there is a problem, or a situation where we need to hold our breath, most of us don t think too much about it. But, our breathing, more importantly  how  we breathe can have a profound effect on how we feel and experience the world. Rapid, shallow breathing is often associated with panic and anxiety and can actually make people feel anxious if they were previously calm. In like manner full, deep, rhythmic breathing can bring on a feeling of calm and relaxation.  In this session you will learn techniques to use your breathing to help relieve stress and produce a calm-relaxed feeling whenever you want.   Location:  Try to find a quiet place where you can either lie on the floor or sit in a comfortable chair. Your bed is OK but be aware of the possibility of falling asleep before you finish  Anything you can do to make the room feel safe and comfortable will help but avoid loud music.  Breath scan:  Place one hand on your chest just over the breastbone. Place your other hand on your stomach just over your navel. Breathe normally. Which hand is moving more?  Briefly, try to make the hand on your chest move more. How does that feel? Now try to make  the hand on your tummy move more. If you have difficulty with this try pressing gently as you breathe out. How does it feel when this hand moves more?  Most people find tummy breathing more comfortable than chest breathing.  The reason for this is a big muscle called the diaphragm. The diaphragm is located near the bottom of your ribs. As it moves down, air (which contains the oxygen our body needs) is drawn into your lungs, as the diaphragm moves up air (which now has less oxygen and more carbon dioxide, a waste product our body produces) is forced out of our lungs. Tummy breathing is more comfortable because the diaphragm is more efficient at moving the air than the muscles in our chest.  When many people get anxious or nervous they tend to breathe using their chest muscles, just breathing off  the top of their lungs.   The problem with this is that less oxygen (good stuff) gets into our system, and less carbon dioxide (bad stuff) gets out of our system. This makes us feel more anxious and want to breathe more. If we keep breathing off the top of our lungs more bad stuff builds up, we feel more anxious  it becomes a vicious cycle.    Deep Breathing:  Try taking 3-5 tummy breaths at a comfortable pace. Count to three as you inhale and three as you exhale. If you loose count of your breaths just start again. Try visualizing the tension leaving your body as you breathe out.   Sighing:  Try letting your breath out in a long sigh. The vibrations feel good don t they?

## 2020-10-27 NOTE — PROGRESS NOTES
[facility] Integrated Behavioral Health  October 27, 2020      Behavioral Health Clinician Progress Note    Patient Name: Frandy Workman           Service Type: Phone Visit      Service Location:  Phone visit      Session Start Time: 3:03 Session End Time: 3:43      Session Length: 38 - 52      Attendees: Patient    Visit Activities (Refresh list every visit): [facility] Only     Telemedicine Visit: The patient's condition can be safely assessed and treated via synchronous audio and visual telemedicine encounter.      Reason for Telemedicine Visit: COVID-19    Originating Site (Patient Location): Patient's home    Distant Site (Provider Location): Provider Remote Setting    Consent:  The patient/guardian has verbally consented to: the potential risks and benefits of telemedicine (video visit) versus in person care; bill my insurance or make self-payment for services provided; and responsibility for payment of non-covered services.     Mode of Communication:  Video Conference via [employer]    As the provider I attest to compliance with applicable laws and regulations related to telemedicine.    Diagnostic Assessment Date: TBD  Treatment Plan Review Date: TBD  See Flowsheets for today's PHQ-9 and LIZZETTE-7 results  Previous PHQ-9:   PHQ-9 SCORE 1/9/2020 10/13/2020   PHQ-9 Total Score MyChart - 8 (Mild depression)   PHQ-9 Total Score 16 8     Previous LIZZETTE-7:   LIZZETTE-7 SCORE 10/13/2020   Total Score 6 (mild anxiety)   Total Score 6       HEATH LEVEL:  No flowsheet data found.    DATA  Extended Session (60+ minutes): No  Interactive Complexity: No  Crisis: No    Treatment Objective(s) Addressed in This Session:  Target Behavior(s): disease management/lifestyle changes related to depressive and anxiety symptoms    Depression and anxious distress management.     Current Stressors / Issues:  Met with Miller today for a [facility] follow-up. Miller notes he had a few successes over the last two weeks. He cites  getting his taxes filed and getting his court sentencing taken care of. He reports that he may have to do some home monitoring for a while, but reports this is favorable to incarceration. Miller reports his primary concern is with more acute anxiety he has experienced lately, though he is not sure why. He reports his mind tends to focus on the next task, which can make him feel dread or anxious quite often. We explored under a solution-focused framework strategies he has found helpful in reducing restlessness/tension before and Miller notes he finds it helpful to write things down. We decided that during the session it would be helpful to have Miller write down what he needs to do and I helped him categorize tasks (e.g. bills, organization, calls etc.). and prioritize them. We processed how this made him feel and discussed his visible reduction in restlessness as he wrote down what he needed to do.     Provided Miller additional instruction of how to use diaphragmatic breathing as a way to help with anxious distress. We explored ways of using tummy-breathing and I demonstrated how to use this technique. Had Miller practice in session and we discussed how and when he could use this skill for grounding purposes. Provided Miller handouts on deep breathing via his AVS.     Miller inquired about how long he could expect to be in therapy and we discussed a treatment plan. Provided him an overview of goals and timeline information.     Progress on Treatment Objective(s) / Homework:  Satisfactory progress - ACTION (Actively working towards change); Intervened by reinforcing change plan / affirming steps taken    Answers for HPI/ROS submitted by the patient on 10/13/2020   If you checked off any problems, how difficult have these problems made it for you to do your work, take care of things at home, or get along with other people?: Somewhat difficult  PHQ9 TOTAL SCORE: 8*  LIZZETTE 7 TOTAL SCORE: 6    *No SI     Supportive counseling used    Solution  focused counseling used     Instructed deep breathing and writing down important tasks on lists    Care Plan review completed: yes    Medication Review:  Changes to psychiatric medications, see updated Medication List in EPIC. Started Abilify - makes him drowsy, but helpful    Medication Compliance:  Yes    Changes in Health Issues:   None reported    Chemical Use Review:   Substance Use: Chemical use reviewed, no active concerns identified      Tobacco Use: No current tobacco use.      Assessment: Current Emotional / Mental Status (status of significant symptoms):  Risk status (Self / Other harm or suicidal ideation)  Patient denies a history of suicidal ideation, suicide attempts, self-injurious behavior, homicidal ideation, homicidal behavior and and other safety concerns     Patient denies current fears or concerns for personal safety.  Patient denies current or recent suicidal ideation or behaviors.  Patient denies current or recent homicidal ideation or behaviors.  Patient denies current or recent self injurious behavior or ideation.  Patient denies other safety concerns.     A safety and risk management plan has not been developed at this time, however patient was encouraged to call [place] [phone] should there be a change in any of these risk factors.    [place] Suicide Severity Rating Scale (Short Version)  [place] Suicide Severity Rating (Short Version) 1/22/2019 1/24/2019 11/10/2019 12/2/2019 12/10/2019 6/11/2020 7/1/2020   Over the past 2 weeks have you felt down, depressed, or hopeless? - - no yes yes no no   Over the past 2 weeks have you had thoughts of killing yourself? - - no yes no no no   Comments - - - lots of stressors, has thought about not taking meds - - -   Have you ever attempted to kill yourself? - - no no no no no   Q1 Wished to be Dead (Past Month) no no - other (see comments) no - -   Comments - - - why did i do this surgery - - -   Q2 Suicidal Thoughts (Past Month) no no - no no  - -   Comments - - - wishes he wouldn't have gone through surgery - - -   Q3 Suicidal Thought Method - - - no no - -   Q4 Suicidal Intent without Specific Plan - - - no no - -   Q5 Suicide Intent with Specific Plan - - - no no - -   Q6 Suicide Behavior (Lifetime) no no - no no - -         Appearance:   Appropriate   Eye Contact:   Good   Psychomotor Behavior: Restless   Attitude:   Cooperative  Interested  Orientation:   All  Speech   Rate / Production: Normal/ Responsive   Volume:  Normal   Mood:    Anxious  Depressed   Affect:    Appropriate   Thought Content:  Clear   Thought Form:  Goal Directed  Logical   Insight:    Fair     Diagnoses:  1. Mild recurrent major depression (H)    2. Anxiety     (per records and reported history)     Collateral Reports Completed:  Not Applicable    Plan: (Homework, other):  Continue with this writer for individual psychotherapy in two weeks. Will update diagnostic assessment. Continue medication regimen. Avoid mood-altering substances.     Joseph Murillo LP  10/27/2020

## 2020-10-28 ENCOUNTER — TELEPHONE (OUTPATIENT)
Dept: PHARMACY | Facility: CLINIC | Age: 55
End: 2020-10-28

## 2020-10-28 ENCOUNTER — INFUSION THERAPY VISIT (OUTPATIENT)
Dept: INFUSION THERAPY | Facility: CLINIC | Age: 55
End: 2020-10-28
Payer: MEDICARE

## 2020-10-28 VITALS
OXYGEN SATURATION: 99 % | DIASTOLIC BLOOD PRESSURE: 80 MMHG | BODY MASS INDEX: 25.46 KG/M2 | SYSTOLIC BLOOD PRESSURE: 128 MMHG | TEMPERATURE: 97 F | RESPIRATION RATE: 16 BRPM | HEART RATE: 91 BPM | WEIGHT: 172.4 LBS

## 2020-10-28 DIAGNOSIS — Z94.4 LIVER REPLACED BY TRANSPLANT (H): ICD-10-CM

## 2020-10-28 DIAGNOSIS — D63.1 ANEMIA OF CHRONIC RENAL FAILURE, STAGE 4 (SEVERE) (H): ICD-10-CM

## 2020-10-28 DIAGNOSIS — N18.4 CKD (CHRONIC KIDNEY DISEASE) STAGE 4, GFR 15-29 ML/MIN (H): Primary | ICD-10-CM

## 2020-10-28 DIAGNOSIS — N18.2 CKD (CHRONIC KIDNEY DISEASE) STAGE 2, GFR 60-89 ML/MIN: Primary | ICD-10-CM

## 2020-10-28 DIAGNOSIS — N18.4 ANEMIA OF CHRONIC RENAL FAILURE, STAGE 4 (SEVERE) (H): ICD-10-CM

## 2020-10-28 LAB
HCT VFR BLD AUTO: 36.5 % (ref 40–53)
HGB BLD-MCNC: 12.5 G/DL (ref 13.3–17.7)
TACROLIMUS BLD-MCNC: 6 UG/L (ref 5–15)
TME LAST DOSE: NORMAL H

## 2020-10-28 PROCEDURE — 36415 COLL VENOUS BLD VENIPUNCTURE: CPT | Performed by: INTERNAL MEDICINE

## 2020-10-28 PROCEDURE — 99207 PR NO CHARGE NURSE ONLY: CPT | Performed by: NURSE PRACTITIONER

## 2020-10-28 PROCEDURE — 85018 HEMOGLOBIN: CPT | Performed by: INTERNAL MEDICINE

## 2020-10-28 PROCEDURE — 85014 HEMATOCRIT: CPT | Performed by: INTERNAL MEDICINE

## 2020-10-28 PROCEDURE — 80197 ASSAY OF TACROLIMUS: CPT | Performed by: INTERNAL MEDICINE

## 2020-10-28 NOTE — PROGRESS NOTES
Infusion Nursing Note:  Frandy Workman presents today for Aranesp-NOT GIVEN.    Patient seen by provider today: No   present during visit today: Not Applicable.    Note: Reports feeling lethargic from antidepressants. Patient states he may contact his MD to see if he could have lab checks monthly instead of every 2 weeks since his Hgb has been up.    Intravenous Access:  No Intravenous access/labs at this visit.    Treatment Conditions:  Lab Results   Component Value Date    HGB 12.5 10/28/2020     Lab Results   Component Value Date    WBC 4.9 10/14/2020      Lab Results   Component Value Date    ANEU 2.1 07/29/2020     Lab Results   Component Value Date     10/14/2020      Results reviewed, labs did NOT meet treatment parameters: Give if Hgb is < 11.5.        Discharge Plan:   Patient will return in two weeks for next appointment-prompted to schedule.  Patient discharged in stable condition accompanied by: self.  Departure Mode: Ambulatory.    Ekaterina Balbuena RN

## 2020-10-28 NOTE — TELEPHONE ENCOUNTER
Anemia Management Note  SUBJECTIVE/OBJECTIVE:  Referred by Dr. Eloy Rao on 3/2/2020  Primary Diagnosis: Anemia in Chronic Kidney Disease (N18.4, D63.1)     Secondary Diagnosis:  Chronic Kidney Disease, Stage 4 (N18.4)                 Liver Transplant: 2019  Hgb goal range:  10-11.5 per Dr. Rao's referral  Epo/Darbo: Aranesp 40mcg every 14 days for Hgb <11.5.  In Clinic.  Iron regimen:  Ferrous Sulfate 325mg once daily - was taking three times daily, decreased in   Labs : Hemoglobin - has standing order for CBC/platelets twice weekly - expires 2020  Iron Labs: 3/3/2021  Recent IVELISSE use, transfusion, IV iron: PRBC on 3/3  RX/TX plans : TBD  No history of stroke, MI and blood clots.  History of hepatocellular carcinoma - s/p TACE 2019 and liver transplant 2019     Contact:            Ok to leave message regarding scheduling, medical, and billing per consent to communicate dated 10/2/19                              OK to speak with Davi Saunders (friend/POA) regarding scheduling, medical, and billing per consent to communicate dated 10/2/19    Anemia Latest Ref Rng & Units 2020 2020 2020 2020 2020 10/14/2020 10/28/2020   IVELISSE Dose - - - - - - - -   Hemoglobin 13.3 - 17.7 g/dL Canceled, Test credited 11.7(L) 11.8(L) 11.8(L) 12.3(L) 13.0(L) 12.5(L)   TSAT 15 - 46 % - - 42 - - 29 -   Ferritin 26 - 388 ng/mL - - 223 - - 323 -   PRBCs - - - - - - - -     BP Readings from Last 3 Encounters:   10/28/20 128/80   10/14/20 127/80   20 (!) 142/78     Wt Readings from Last 2 Encounters:   10/28/20 78.2 kg (172 lb 6.4 oz)   10/14/20 78 kg (171 lb 14.4 oz)           ASSESSMENT:  Hgb:Above goal - recommend hold dose  TSat: not at goal of >30% Ferritin: At goal (>100ng/mL)    PLAN:  RTC for Hgb in 4 week(s) and Check iron studies in 4 week(s). Has not needed IVELISSE since 2020    Orders needed to be renewed (for next follow-up date) in EPIC: None    Iron  labs due:  11/11/2020    Plan discussed with:  No call, chart review  Plan provided by:  adri    NEXT FOLLOW-UP DATE:  12/2/2020    Jie Blum RN   Anemia Services  [address] [address]   [email or web address]   Office : [phone]  Fax: [phone]

## 2020-11-07 DIAGNOSIS — N18.30 TYPE 2 DIABETES MELLITUS WITH STAGE 3 CHRONIC KIDNEY DISEASE, WITH LONG-TERM CURRENT USE OF INSULIN (H): ICD-10-CM

## 2020-11-07 DIAGNOSIS — Z79.4 TYPE 2 DIABETES MELLITUS WITH STAGE 3 CHRONIC KIDNEY DISEASE, WITH LONG-TERM CURRENT USE OF INSULIN (H): ICD-10-CM

## 2020-11-07 DIAGNOSIS — E11.22 TYPE 2 DIABETES MELLITUS WITH STAGE 3 CHRONIC KIDNEY DISEASE, WITH LONG-TERM CURRENT USE OF INSULIN (H): ICD-10-CM

## 2020-11-07 NOTE — TELEPHONE ENCOUNTER
"insulin degludec (TRESIBA FLEXTOUCH) 100 UNIT/ML pen   Routing refill request to provider for    \" We increased Tresiba to 27 units daily,\"      routed because: \" Pt states now doing 27 units of tresiba daily\"  per med list 22 unit(s).       "

## 2020-11-07 NOTE — TELEPHONE ENCOUNTER
Pt states now doing 27 units of tresiba daily    Thank you!  Desirae Rodriguez CPhT  [facility]

## 2020-11-10 ENCOUNTER — TELEPHONE (OUTPATIENT)
Dept: ENDOCRINOLOGY | Facility: CLINIC | Age: 55
End: 2020-11-10

## 2020-11-10 NOTE — TELEPHONE ENCOUNTER
Action Needed --  I've placed a hold that needs scheduling. Please see below.  Department: endocrine / diabetes   Provider:Tish Toscano   Date/Time: Tuesday 11/17/20 1 PM  video  RFV:Diabetes  Brisa Woods RN on 11/10/2020 at 10:41 AM

## 2020-11-11 ENCOUNTER — VIRTUAL VISIT (OUTPATIENT)
Dept: BEHAVIORAL HEALTH | Facility: CLINIC | Age: 55
End: 2020-11-11
Payer: MEDICARE

## 2020-11-11 ENCOUNTER — INFUSION THERAPY VISIT (OUTPATIENT)
Dept: INFUSION THERAPY | Facility: CLINIC | Age: 55
End: 2020-11-11
Payer: MEDICARE

## 2020-11-11 VITALS
HEART RATE: 84 BPM | WEIGHT: 173 LBS | BODY MASS INDEX: 25.55 KG/M2 | TEMPERATURE: 97.5 F | OXYGEN SATURATION: 99 % | DIASTOLIC BLOOD PRESSURE: 64 MMHG | SYSTOLIC BLOOD PRESSURE: 105 MMHG | RESPIRATION RATE: 18 BRPM

## 2020-11-11 DIAGNOSIS — N18.4 ANEMIA OF CHRONIC RENAL FAILURE, STAGE 4 (SEVERE) (H): ICD-10-CM

## 2020-11-11 DIAGNOSIS — Z94.4 LIVER REPLACED BY TRANSPLANT (H): ICD-10-CM

## 2020-11-11 DIAGNOSIS — N18.4 CKD (CHRONIC KIDNEY DISEASE) STAGE 4, GFR 15-29 ML/MIN (H): Primary | ICD-10-CM

## 2020-11-11 DIAGNOSIS — D63.1 ANEMIA OF CHRONIC RENAL FAILURE, STAGE 4 (SEVERE) (H): ICD-10-CM

## 2020-11-11 DIAGNOSIS — N18.4 ANEMIA DUE TO STAGE 4 CHRONIC KIDNEY DISEASE (H): ICD-10-CM

## 2020-11-11 DIAGNOSIS — N18.4 CKD (CHRONIC KIDNEY DISEASE) STAGE 4, GFR 15-29 ML/MIN (H): ICD-10-CM

## 2020-11-11 DIAGNOSIS — F33.0 MILD RECURRENT MAJOR DEPRESSION (H): Primary | ICD-10-CM

## 2020-11-11 DIAGNOSIS — D63.1 ANEMIA DUE TO STAGE 4 CHRONIC KIDNEY DISEASE (H): ICD-10-CM

## 2020-11-11 DIAGNOSIS — C22.0 HCC (HEPATOCELLULAR CARCINOMA) (H): Primary | ICD-10-CM

## 2020-11-11 LAB
ALBUMIN SERPL-MCNC: 4.1 G/DL (ref 3.4–5)
ALP SERPL-CCNC: 120 U/L (ref 40–150)
ALT SERPL W P-5'-P-CCNC: 31 U/L (ref 0–70)
ANION GAP SERPL CALCULATED.3IONS-SCNC: 3 MMOL/L (ref 3–14)
AST SERPL W P-5'-P-CCNC: 16 U/L (ref 0–45)
BILIRUB DIRECT SERPL-MCNC: 0.2 MG/DL (ref 0–0.2)
BILIRUB SERPL-MCNC: 0.7 MG/DL (ref 0.2–1.3)
BUN SERPL-MCNC: 45 MG/DL (ref 7–30)
CALCIUM SERPL-MCNC: 9.4 MG/DL (ref 8.5–10.1)
CHLORIDE SERPL-SCNC: 109 MMOL/L (ref 94–109)
CO2 SERPL-SCNC: 27 MMOL/L (ref 20–32)
CREAT SERPL-MCNC: 1.74 MG/DL (ref 0.66–1.25)
ERYTHROCYTE [DISTWIDTH] IN BLOOD BY AUTOMATED COUNT: 14.4 % (ref 10–15)
FERRITIN SERPL-MCNC: 286 NG/ML (ref 26–388)
GFR SERPL CREATININE-BSD FRML MDRD: 43 ML/MIN/{1.73_M2}
GLUCOSE SERPL-MCNC: 124 MG/DL (ref 70–99)
HCT VFR BLD AUTO: 36.5 % (ref 40–53)
HGB BLD-MCNC: 12.8 G/DL (ref 13.3–17.7)
IRON SATN MFR SERPL: 36 % (ref 15–46)
IRON SERPL-MCNC: 81 UG/DL (ref 35–180)
MAGNESIUM SERPL-MCNC: 2.2 MG/DL (ref 1.6–2.3)
MCH RBC QN AUTO: 33 PG (ref 26.5–33)
MCHC RBC AUTO-ENTMCNC: 35.1 G/DL (ref 31.5–36.5)
MCV RBC AUTO: 94 FL (ref 78–100)
PLATELET # BLD AUTO: 131 10E9/L (ref 150–450)
POTASSIUM SERPL-SCNC: 4.5 MMOL/L (ref 3.4–5.3)
PROT SERPL-MCNC: 8.1 G/DL (ref 6.8–8.8)
RBC # BLD AUTO: 3.88 10E12/L (ref 4.4–5.9)
SODIUM SERPL-SCNC: 139 MMOL/L (ref 133–144)
TACROLIMUS BLD-MCNC: 7.5 UG/L (ref 5–15)
TIBC SERPL-MCNC: 227 UG/DL (ref 240–430)
TME LAST DOSE: NORMAL H
WBC # BLD AUTO: 4.4 10E9/L (ref 4–11)

## 2020-11-11 PROCEDURE — 80197 ASSAY OF TACROLIMUS: CPT | Performed by: INTERNAL MEDICINE

## 2020-11-11 PROCEDURE — 82728 ASSAY OF FERRITIN: CPT | Performed by: INTERNAL MEDICINE

## 2020-11-11 PROCEDURE — 80048 BASIC METABOLIC PNL TOTAL CA: CPT | Performed by: INTERNAL MEDICINE

## 2020-11-11 PROCEDURE — 85027 COMPLETE CBC AUTOMATED: CPT | Performed by: INTERNAL MEDICINE

## 2020-11-11 PROCEDURE — 83540 ASSAY OF IRON: CPT | Performed by: INTERNAL MEDICINE

## 2020-11-11 PROCEDURE — 36415 COLL VENOUS BLD VENIPUNCTURE: CPT | Performed by: INTERNAL MEDICINE

## 2020-11-11 PROCEDURE — 90834 PSYTX W PT 45 MINUTES: CPT | Mod: 95 | Performed by: PSYCHOLOGIST

## 2020-11-11 PROCEDURE — 99207 PR NO CHARGE LOS: CPT

## 2020-11-11 PROCEDURE — 83550 IRON BINDING TEST: CPT | Performed by: INTERNAL MEDICINE

## 2020-11-11 PROCEDURE — 83735 ASSAY OF MAGNESIUM: CPT | Performed by: INTERNAL MEDICINE

## 2020-11-11 PROCEDURE — 80076 HEPATIC FUNCTION PANEL: CPT | Performed by: INTERNAL MEDICINE

## 2020-11-11 ASSESSMENT — PAIN SCALES - GENERAL: PAINLEVEL: MODERATE PAIN (5)

## 2020-11-11 NOTE — PROGRESS NOTES
[facility] Integrated [facility]  November 11, 2020      Behavioral Health Clinician Progress Note    Patient Name: Frandy Workman           Service Type: Phone Visit      Service Location:  Phone visit      Session Start Time: 3:03 Session End Time: 3:53      Session Length: 38 - 52      Attendees: Patient    Visit Activities (Refresh list every visit): [facility] Only     Telemedicine Visit: The patient's condition can be safely assessed and treated via synchronous audio and visual telemedicine encounter.      Reason for Telemedicine Visit: COVID-19    Originating Site (Patient Location): Patient's home    Distant Site (Provider Location): Provider Remote Setting    Consent:  The patient/guardian has verbally consented to: the potential risks and benefits of telemedicine (video visit) versus in person care; bill my insurance or make self-payment for services provided; and responsibility for payment of non-covered services.     Mode of Communication:  Video Conference via [employer]    As the provider I attest to compliance with applicable laws and regulations related to telemedicine.    Diagnostic Assessment Date: TBD  Treatment Plan Review Date: TBD  See Flowsheets for today's PHQ-9 and LIZZETTE-7 results  Previous PHQ-9:   PHQ-9 SCORE 1/9/2020 10/13/2020   PHQ-9 Total Score MyChart - 8 (Mild depression)   PHQ-9 Total Score 16 8     Previous LIZZETTE-7:   LIZZETTE-7 SCORE 10/13/2020   Total Score 6 (mild anxiety)   Total Score 6       HEATH LEVEL:  No flowsheet data found.    DATA  Extended Session (60+ minutes): No  Interactive Complexity: No  Crisis: No    Treatment Objective(s) Addressed in This Session:  Target Behavior(s): disease management/lifestyle changes related to depressive and anxiety symptoms    Depression and anxious distress management.     Current Stressors / Issues:  Efrains condition appeared improved from our last session. He reports getting more done lately, like paying  bills and taking care of various legal matters. Miller says this has helped clear his mind a bit, but he reports he is still struggling, feeling down and that his sleep is problematic. With sleep, he notes waking intermittently and will have ruminative thoughts about his daughter and past mistakes. While these thoughts don't particuarly bother him per se, he did report they interfere with going back to sleep and he often questions why he thinks so much about the past. Miller wonders if this is a type of mental illness he is dealing with.     I explained to Miller that his racing thought pattern about the past and trouble letting ideas go seems most consistent with rumination. I explained rumination can be found in a multitude of clinical presentations, ranging from anxiety to depression and OCD. We discussed how it seems to be a symptom of his depression, not a diagnosis in its own right. I explained that it seems his rumination seems to include themes of remorse, self-blame, and guilt, to which Miller agreed.     We decided to explore more of his ruminative thoughts about his daughter. We talked about the circumstances and legal proceedings that Miller is currently involved in. Helped Miller process these difficult experiences and explore emotional experiences. I provided information about responsibility versus self-blame in thinking about past events as a method of helping Miller resolve his underlying conflict. We discussed this in detail and I helped Miller apply this idea to his current situation.     Progress on Treatment Objective(s) / Homework:  Satisfactory progress - ACTION (Actively working towards change); Intervened by reinforcing change plan / affirming steps taken    Answers for HPI/ROS submitted by the patient on 10/13/2020   If you checked off any problems, how difficult have these problems made it for you to do your work, take care of things at home, or get along with other people?: Somewhat difficult  PHQ9 TOTAL SCORE:  8*  LIZZETTE 7 TOTAL SCORE: 6    *No SI     Supportive counseling used    Insight focused counseling used     Care Plan review completed: yes    Medication Review:  Changes to psychiatric medications, see updated Medication List in EPIC. Started Abilify - makes him drowsy, but helpful    Medication Compliance:  Yes    Changes in Health Issues:   None reported    Chemical Use Review:   Substance Use: Chemical use reviewed, no active concerns identified      Tobacco Use: No current tobacco use.      Assessment: Current Emotional / Mental Status (status of significant symptoms):  Risk status (Self / Other harm or suicidal ideation)  Patient denies a history of suicidal ideation, suicide attempts, self-injurious behavior, homicidal ideation, homicidal behavior and and other safety concerns     Patient denies current fears or concerns for personal safety.  Patient denies current or recent suicidal ideation or behaviors.  Patient denies current or recent homicidal ideation or behaviors.  Patient denies current or recent self injurious behavior or ideation.  Patient denies other safety concerns.     A safety and risk management plan has not been developed at this time, however patient was encouraged to call [place] [phone] should there be a change in any of these risk factors.    [place] Suicide Severity Rating Scale (Short Version)  [place] Suicide Severity Rating (Short Version) 1/22/2019 1/24/2019 11/10/2019 12/2/2019 12/10/2019 6/11/2020 7/1/2020   Over the past 2 weeks have you felt down, depressed, or hopeless? - - no yes yes no no   Over the past 2 weeks have you had thoughts of killing yourself? - - no yes no no no   Comments - - - lots of stressors, has thought about not taking meds - - -   Have you ever attempted to kill yourself? - - no no no no no   Q1 Wished to be Dead (Past Month) no no - other (see comments) no - -   Comments - - - why did i do this surgery - - -   Q2 Suicidal Thoughts (Past Month) no no -  no no - -   Comments - - - wishes he wouldn't have gone through surgery - - -   Q3 Suicidal Thought Method - - - no no - -   Q4 Suicidal Intent without Specific Plan - - - no no - -   Q5 Suicide Intent with Specific Plan - - - no no - -   Q6 Suicide Behavior (Lifetime) no no - no no - -         Appearance:   Appropriate   Eye Contact:   Good   Psychomotor Behavior: Restless   Attitude:   Cooperative  Interested  Orientation:   All  Speech   Rate / Production: Normal/ Responsive   Volume:  Normal   Mood:    Anxious  Depressed   Affect:    Appropriate   Thought Content:  Clear   Thought Form:  Goal Directed  Logical   Insight:    Fair     Diagnoses:  1. Mild recurrent major depression (H)     (per records and reported history)     Collateral Reports Completed:  Not Applicable    Plan: (Homework, other):  Continue with this writer for individual psychotherapy in two weeks. Will update diagnostic assessment. Continue medication regimen. Avoid mood-altering substances.     Joseph Murillo LP  11/11/2020

## 2020-11-11 NOTE — PROGRESS NOTES
Brief Medicine Note  June 18, 2020    Medicine following BGs peripherally.      DMII with retinopathy. Most recent Hgb A1C 4.8 on 3/4. Recent virtual visit with Endocrine provider on 5/7/20. Last seen by ophthalmology 3/4/20. BGs have been running on low end  prompting decrease in insulin requirements as patient complains of hypoglycemic symptoms when his BGs drop below 100, however now they are ranging 150-280.   - Increase tresiba to 22U qPM  - Cont novolog to 1U per 20g carb TID with meals  - BG TID ac, at bedtime, and 0200  - Hypoglycemia protocol  - Will need follow up with Ophthalmology on discharge (was supposed to f/u in April)     Anemia of chronic disease. Maintained on daily iron supplementation and aranesp 40mcg injections every 2 weeks as long as Hgb <11.5 (last on 6/3/2020). BL Hgb appears to be ~8-9. Hgb 9.2 on 6/11.   - Cont ferrous sulfate 325mg daily  - Will check CBC tomorrow, if Hgb <11.5 will order aranesp to be given subcutaneous on unit per patients infusion protocol    Pilar Barnes PA-C   Price (Do Not Change): 0.00 Instructions: This plan will send the code FBSD to the PM system.  DO NOT or CHANGE the price. Detail Level: Simple

## 2020-11-11 NOTE — PROGRESS NOTES
Infusion Nursing Note:  Frandy Workman presents today for Aranesp - NOT GIVEN.    Patient seen by provider today: No   present during visit today: Not Applicable.    Note: Pt reports no major changes within the last 2 weeks. Still some fatigue but nothing new.    Intravenous Access:  No Intravenous access/labs at this visit.    Treatment Conditions:  Lab Results   Component Value Date    HGB 12.8 11/11/2020     Lab Results   Component Value Date    WBC 4.4 11/11/2020      Lab Results   Component Value Date    ANEU 2.1 07/29/2020     Lab Results   Component Value Date     11/11/2020          Discharge Plan:   Patient will return in 2 weeks for next appointment.   Patient directed to scheduling.  Patient discharged in stable condition accompanied by: self.  Departure Mode: Ambulatory.    Deena Church RN

## 2020-11-12 ENCOUNTER — TELEPHONE (OUTPATIENT)
Dept: TRANSPLANT | Facility: CLINIC | Age: 55
End: 2020-11-12

## 2020-11-12 DIAGNOSIS — Z94.4 LIVER TRANSPLANT RECIPIENT (H): ICD-10-CM

## 2020-11-12 RX ORDER — TACROLIMUS 1 MG/1
CAPSULE ORAL
Qty: 450 CAPSULE | Refills: 3 | Status: SHIPPED | OUTPATIENT
Start: 2020-11-12 | End: 2020-12-03

## 2020-11-12 NOTE — TELEPHONE ENCOUNTER
ISSUE:   Tacrolimus IR level 7.5 on 11/11/2020, goal 5-  6, dose 3 mg BID.    PLAN:   Please call patient and confirm this was an accurate 12-hour trough. Verify Tacrolimus IR dose 3 mg BID. Confirm no new medications or illness. Confirm no missed doses. If accurate trough and accurate dose, decrease Tacrolimus IR dose to 3 mg AM and 2 mg PM and repeat labs in 4 weeks    OUTCOME:   Spoke with patient, they confirm accurate trough level and current dose 3 mg BID. Patient confirmed dose change to 3 mg am, 2 mg pm and to repeat labs in 1 months. Orders sent to preferred pharmacy for dose change and lab for repeat labs. Patient voiced understanding of plan.     Deena Marshall LPN

## 2020-11-18 NOTE — PROGRESS NOTES
"Outcome for 11/18/20 6:00 PM :Patient is sharing data via clinic device website and patient has been instructed to update data on website. Find login information by using .Nasir Alexander    Frandy Workman is a 55 year old male who is being evaluated via a billable video visit.      The patient has been notified of following:     \"This video visit will be conducted via a call between you and your physician/provider. We have found that certain health care needs can be provided without the need for an in-person physical exam.  This service lets us provide the care you need with a video conversation.  If a prescription is necessary we can send it directly to your pharmacy.  If lab work is needed we can place an order for that and you can then stop by our lab to have the test done at a later time.    Video visits are billed at different rates depending on your insurance coverage.  Please reach out to your insurance provider with any questions.    If during the course of the call the physician/provider feels a video visit is not appropriate, you will not be charged for this service.\"    Patient has given verbal consent for Video visit? Yes  How would you like to obtain your AVS? MyChart  If you are dropped from the video visit, the video invite should be resent to: [email or web address]   Will anyone else be joining your video visit? No        Video-Visit Details    Type of service:  Video Visit    Video Start Time: 2:45 PM  Video End Time: 3:02 PM    Originating Location (pt. Location): Home    Distant Location (provider location):  [facility]     Platform used for Video Visit: Doximity    Outcome for 10/26/20 11:20 AM         Diabetes Virtual Consult Note  Tish Toscano PA-C  November 19, 2020    Frandy Workman is a 55 year old male with a past medical history including  has a past medical history of Anemia (2013), Arthritis, BPH (benign prostatic hyperplasia), CAD (coronary artery disease) " (2019), Cholelithiasis, Conductive hearing loss (2017), Depressive disorder (), Gastroesophageal reflux disease (2014), HCC (hepatocellular carcinoma) (H) (2019), History of diabetic retinopathy (2018), HTN (hypertension), HTN (hypertension) (2019), Hyperlipidemia, Liver cirrhosis secondary to ESTRADA (H), Liver transplanted (H) (2019), Portal vein thrombosis (2019), and Type II diabetes mellitus (H). He also has no past medical history of Asymptomatic human immunodeficiency virus (HIV) infection status (H), Cerebral infarction (H), Congestive heart failure (H), COPD (chronic obstructive pulmonary disease) (H), History of blood transfusion, Infectious mononucleosis, PONV (postoperative nausea and vomiting), Thyroid disease, Tuberculosis, or Uncomplicated asthma.    When I spoke to Miller in late September he continued to have high BG  after having started Abilify over the summer and becoming more l sedentary, tired, lethargic, hungry, sleeping more since starting the medication with weight increasing to 172 lbs and average BG increased to 231 with high BG overnight.  We increased Tresiba to 27 units daily, carbohydrate coverage to 1 unit : 15 g of carbohydrate, continued 1unit Novolo mg /dl >150, and added  Empagliflozin 10 mg daily.    In late October he was doing well with this, but since has contacted me concerned about night sweats and I have asked him to check his BG over night.      Interim:    [facility] reports that since our My Chart he has checked BG when awoke warm at night and it was fine.    And now that has resolved.  Sleeping well, no increased warmth, fever or chills.  He did not sweat enough to dampen bedding at any point, just was warm and sweating and resolved after awoke.  Has not had in at least one week now.      Nothing else new at this time.     Feels comfortable with f/u  In late January as planned.      Dm Regimen:   - Tresiba 27 units /day.   -   carbohydrate coverage to 1 unit : 15 g of carbohydrate.   -  Correction Novolounit Novolo mg /dl >150     - Empagliflozin 10 mg daily.      We reviewed glucometer, pump and CGMS data together.  It revealed:        History of Diabetes monitoring and complications/ prevention:  CAD: yes 2019  Last eye exam results:20  - referred to retina specialists - considering injection.    Microalbuminuria: yes - has CKD, not currently on Ace or ARB, sees Cardiology , Neprhology  HTN: past  On statin: yes  On ASA:yes  Depression:yes  - sees psych  ED:did not inquire    Frandy's PMH reviewed with him.      Current Outpatient Medications   Medication     Acetaminophen (TYLENOL) 325 MG CAPS     ARIPiprazole (ABILIFY) 5 MG tablet     Artificial Tear Solution (SM ARTIFICIAL TEARS) SOLN     aspirin 81 MG EC tablet     BD VIKTORIA U/F 32G X 4 MM insulin pen needle     ciclopirox (LOPROX) 0.77 % cream     Continuous Blood Gluc  (FREESTYLE CLAUDIA 14 DAY READER) CECILIO     Continuous Blood Gluc Sensor (FREESTYLE CLAUDIA 14 DAY SENSOR) MISC     empagliflozin (JARDIANCE) 10 MG TABS tablet     ferrous sulfate (FEROSUL) 325 (65 Fe) MG tablet     glucose (BD GLUCOSE) 4 g chewable tablet     insulin aspart (NOVOLOG PEN) 100 UNIT/ML pen     insulin degludec (TRESIBA FLEXTOUCH) 100 UNIT/ML pen     lamiVUDine (EPIVIR) 100 MG tablet     magnesium oxide (MAG-OX) 400 MG tablet     Multiple Vitamin (THERAVITE PO)     order for DME     pantoprazole (PROTONIX) 40 MG EC tablet     polyethylene glycol (MIRALAX) 17 g packet     rosuvastatin (CRESTOR) 5 MG tablet     tacrolimus (GENERIC EQUIVALENT) 1 MG capsule     tamsulosin (FLOMAX) 0.4 MG capsule     vitamin B-12 (CYANOCOBALAMIN) 1000 MCG tablet     vitamin D3 (CHOLECALCIFEROL) 2000 units (50 mcg) tablet     Current Facility-Administered Medications   Medication     Aflibercept (EYLEA) injection 2 mg              Yehuda family history includes Asthma in his sister; Cancer in his father,  "maternal grandmother, and mother; Cerebrovascular Disease in his mother; Colon Cancer (age of onset: 60) in his father; Colorectal Cancer in his father, maternal grandmother, and paternal grandmother; Depression in his mother and sister; Diabetes in his mother; Glaucoma in his father; Macular Degeneration in his father; Pancreatic Cancer (age of onset: 60) in his father; Prostate Cancer in his father and maternal grandfather; Skin Cancer in his father; Substance Abuse in his maternal grandfather and maternal grandmother; Thyroid Disease in his mother and sister.    ROS:   Patient denies any fevers, chills or sweats as well as any changes in or problems with vision, pain or problems with dentition, new or different headaches.  Patient denies symptoms of hypo and hyperglycemia except as above.   Patients denies marked fatigue, cough, shortness of breath, chest pain or pressure.  There has been no pain with or other changes in urination or  itching or pain in genital areas.  Patient denies any noted swelling in feet, ankles or otherwise, loss of sensation or pain  in feet or other areas.    Patient also denies current difficulties with depressed mood, anhedonia or worrying too much.        Exam:    PHONE VISIT        Wt Readings from Last 10 Encounters:   10/14/20 78 kg (171 lb 14.4 oz)   09/30/20 78.8 kg (173 lb 12.8 oz)   09/25/20 (P) 79 kg (174 lb 1.6 oz)   09/16/20 77.6 kg (171 lb)   08/19/20 76.9 kg (169 lb 8 oz)   08/12/20 76.8 kg (169 lb 4.8 oz)   07/29/20 75.6 kg (166 lb 9.6 oz)   07/28/20 75.8 kg (167 lb)   07/15/20 73.4 kg (161 lb 12.8 oz)   07/01/20 70.3 kg (155 lb)         Frandy is alerted and oriented.  Mood is \"good,\" affect is congruent.  Thoughtful form and content are fluid and coherent.  No signs of distress are appreciated.      Data:      Most recent:  Lab Results   Component Value Date    CR 1.95 10/14/2020     Lab Results   Component Value Date     10/14/2020       GFR Estimate   Date " Value Ref Range Status   11/11/2020 43 (L) >60 mL/min/[1.73_m2] Final     Comment:     Non  GFR Calc  Starting 12/18/2018, serum creatinine based estimated GFR (eGFR) will be   calculated using the Chronic Kidney Disease Epidemiology Collaboration   (CKD-EPI) equation.     10/14/2020 37 (L) >60 mL/min/[1.73_m2] Final     Comment:     Non  GFR Calc  Starting 12/18/2018, serum creatinine based estimated GFR (eGFR) will be   calculated using the Chronic Kidney Disease Epidemiology Collaboration   (CKD-EPI) equation.     09/25/2020 52 (L) >60 mL/min/[1.73_m2] Final     Comment:     Non  GFR Calc  Starting 12/18/2018, serum creatinine based estimated GFR (eGFR) will be   calculated using the Chronic Kidney Disease Epidemiology Collaboration   (CKD-EPI) equation.       GFR Estimate If Black   Date Value Ref Range Status   11/11/2020 49 (L) >60 mL/min/[1.73_m2] Final     Comment:      GFR Calc  Starting 12/18/2018, serum creatinine based estimated GFR (eGFR) will be   calculated using the Chronic Kidney Disease Epidemiology Collaboration   (CKD-EPI) equation.     10/14/2020 43 (L) >60 mL/min/[1.73_m2] Final     Comment:      GFR Calc  Starting 12/18/2018, serum creatinine based estimated GFR (eGFR) will be   calculated using the Chronic Kidney Disease Epidemiology Collaboration   (CKD-EPI) equation.     09/25/2020 60 (L) >60 mL/min/[1.73_m2] Final     Comment:      GFR Calc  Starting 12/18/2018, serum creatinine based estimated GFR (eGFR) will be   calculated using the Chronic Kidney Disease Epidemiology Collaboration   (CKD-EPI) equation.           Lab Results   Component Value Date    A1C 6.3 (H) 06/13/2020    A1C 6.6 (H) 08/08/2018    A1C 6.5 (H) 06/09/2017    A1C 7.8 (H) 10/25/2016    HEMOGLOBINA1 4.8 03/04/2020    HEMOGLOBINA1 5.1 12/02/2019    HEMOGLOBINA1 5.4 08/14/2019    HEMOGLOBINA1 6.1 (A) 02/11/2019    HEMOGLOBINA1  8.1 (A) 04/11/2018     Lab Results   Component Value Date    MICROL 196 06/29/2020     No results found for: MICROALBUMIN  No results found for: CPEPT, GADAB, ISCAB  Cholesterol   Date Value Ref Range Status   09/25/2020 146 <200 mg/dL Final   07/29/2020 192 <200 mg/dL Final     HDL Cholesterol   Date Value Ref Range Status   09/25/2020 39 (L) >39 mg/dL Final   07/29/2020 39 (L) >39 mg/dL Final     LDL Cholesterol Calculated   Date Value Ref Range Status   09/25/2020 61 <100 mg/dL Final     Comment:     Desirable:       <100 mg/dl   07/29/2020 117 (H) <100 mg/dL Final     Comment:     Above desirable:  100-129 mg/dl  Borderline High:  130-159 mg/dL  High:             160-189 mg/dL  Very high:       >189 mg/dl       Triglycerides   Date Value Ref Range Status   09/25/2020 228 (H) <150 mg/dL Final     Comment:     Borderline high:  150-199 mg/dl  High:             200-499 mg/dl  Very high:       >499 mg/dl     07/29/2020 181 (H) <150 mg/dL Final     Comment:     Borderline high:  150-199 mg/dl  High:             200-499 mg/dl  Very high:       >499 mg/dl       No results found for: CHOLHDLRATIO                Assessment/Plan:    Frandy is a 55 year old male with  has a past medical history of Anemia (2013), Arthritis, BPH (benign prostatic hyperplasia), CAD (coronary artery disease) (4/1/2019), Cholelithiasis, Conductive hearing loss (08/16/2017), Depressive disorder (1986), Gastroesophageal reflux disease (12/01/2014), HCC (hepatocellular carcinoma) (H) (1/22/2019), History of diabetic retinopathy (07/2018), HTN (hypertension), HTN (hypertension) (11/20/2019), Hyperlipidemia, Liver cirrhosis secondary to ESTRADA (H), Liver transplanted (H) (11/11/2019), Portal vein thrombosis (4/11/2019), and Type II diabetes mellitus (H). He also has no past medical history of Asymptomatic human immunodeficiency virus (HIV) infection status (H), Cerebral infarction (H), Congestive heart failure (H), COPD (chronic obstructive pulmonary  disease) (H), History of blood transfusion, Infectious mononucleosis, PONV (postoperative nausea and vomiting), Thyroid disease, Tuberculosis, or Uncomplicated asthma.      DM2, well managed.    Recommend continue:     - Tresiba 27 units /day.   -  carbohydrate coverage to 1 unit : 15 g of carbohydrate.   -  Correction Novolounit Novolo mg /dl >150     - Empagliflozin 10 mg daily.  Add exercise early in day.  Reccomend a mix of weight bearing and aerobic exercise to meet ADA recommendations.      Consider nutrition heart healthy diet, weight maintenance diet.      2. DM Complications-     Lifestyle modification focusing on application of a Mediterranean diet or Dietary Approaches to Stop Hypertension (DASH) dietary pattern; reduction of saturated fat and trans fat; increase of dietary n-3 fatty acids, viscous fiber, and plant stanols/sterols intake; and increased physical activity recommended to improve the lipid profile and reduce the risk of developing atherosclerotic cardiovascular disease.     Retinopathy:  Yes.  Seeing retina specialist.    Nephropathy:  No current elevated BP.  Creatinine stable. Consider ACe I / ARB  Neuropathy: No.    Feet: OK, no ulcers or lesions.   Lipids:   Patient taking a statin.    3. Night sweats:  If recur, please note any other symptoms and advised discuss with primary care and psychiatry. I will also consider TSHR.        >50% of 17 minute visit spent in counseling, education and coordination of care related to healthy lifestyle, prevention of diabetic complications, options for better glycemic control as well as preventing, detecting, and treating hypoglycemia.      It is my privilege to be involved in the care of the above patient.     Tish Toscano PA-C, MPAS  [facility]  Diabetes, Endocrinology, and Metabolism  [phone] Appointments/Nurse  [phone] pager  [phone] nurse line    This note was completed in part using Dragon voice  recognition, and may contain word and grammatical errors.

## 2020-11-19 ENCOUNTER — VIRTUAL VISIT (OUTPATIENT)
Dept: ENDOCRINOLOGY | Facility: CLINIC | Age: 55
End: 2020-11-19
Payer: MEDICARE

## 2020-11-19 DIAGNOSIS — Z79.4 TYPE 2 DIABETES MELLITUS WITH STAGE 3B CHRONIC KIDNEY DISEASE, WITH LONG-TERM CURRENT USE OF INSULIN (H): Primary | ICD-10-CM

## 2020-11-19 DIAGNOSIS — E11.22 TYPE 2 DIABETES MELLITUS WITH STAGE 3B CHRONIC KIDNEY DISEASE, WITH LONG-TERM CURRENT USE OF INSULIN (H): Primary | ICD-10-CM

## 2020-11-19 DIAGNOSIS — N18.32 TYPE 2 DIABETES MELLITUS WITH STAGE 3B CHRONIC KIDNEY DISEASE, WITH LONG-TERM CURRENT USE OF INSULIN (H): Primary | ICD-10-CM

## 2020-11-19 DIAGNOSIS — Z94.4 LIVER TRANSPLANT STATUS (H): ICD-10-CM

## 2020-11-19 DIAGNOSIS — R61 NIGHT SWEATS: ICD-10-CM

## 2020-11-19 PROCEDURE — 99213 OFFICE O/P EST LOW 20 MIN: CPT | Mod: 95 | Performed by: PHYSICIAN ASSISTANT

## 2020-11-19 NOTE — LETTER
"11/19/2020       RE: Frandy Workman  [address] [facility] [address]     Dear Colleague,    Thank you for referring your patient, Frandy Workman, to the [facility] ENDOCRINOLOGY [facility]. Please see a copy of my visit note below.    Outcome for 11/18/20 6:00 PM :Patient is sharing data via clinic device website and patient has been instructed to update data on website. Find login information by using .[facility]    Frandy Workman is a 55 year old male who is being evaluated via a billable video visit.      The patient has been notified of following:     \"This video visit will be conducted via a call between you and your physician/provider. We have found that certain health care needs can be provided without the need for an in-person physical exam.  This service lets us provide the care you need with a video conversation.  If a prescription is necessary we can send it directly to your pharmacy.  If lab work is needed we can place an order for that and you can then stop by our lab to have the test done at a later time.    Video visits are billed at different rates depending on your insurance coverage.  Please reach out to your insurance provider with any questions.    If during the course of the call the physician/provider feels a video visit is not appropriate, you will not be charged for this service.\"    Patient has given verbal consent for Video visit? Yes  How would you like to obtain your AVS? MyChart  If you are dropped from the video visit, the video invite should be resent to: [email or web address]   Will anyone else be joining your video visit? No        Video-Visit Details    Type of service:  Video Visit    Video Start Time: 2:45 PM  Video End Time: 3:02 PM    Originating Location (pt. Location): Home    Distant Location (provider location):  [facility] ENDOCRINOLOGY [facility]     Platform used for Video Visit: " Doximity    Outcome for 10/26/20 11:20 AM         Diabetes Virtual Consult Note  Tish Toscano PA-C  2020    Frandy Workman is a 55 year old male with a past medical history including  has a past medical history of Anemia (), Arthritis, BPH (benign prostatic hyperplasia), CAD (coronary artery disease) (2019), Cholelithiasis, Conductive hearing loss (2017), Depressive disorder (), Gastroesophageal reflux disease (2014), HCC (hepatocellular carcinoma) (H) (2019), History of diabetic retinopathy (2018), HTN (hypertension), HTN (hypertension) (2019), Hyperlipidemia, Liver cirrhosis secondary to ESTRADA (H), Liver transplanted (H) (2019), Portal vein thrombosis (2019), and Type II diabetes mellitus (H). He also has no past medical history of Asymptomatic human immunodeficiency virus (HIV) infection status (H), Cerebral infarction (H), Congestive heart failure (H), COPD (chronic obstructive pulmonary disease) (H), History of blood transfusion, Infectious mononucleosis, PONV (postoperative nausea and vomiting), Thyroid disease, Tuberculosis, or Uncomplicated asthma.    When I spoke to Miller in late September he continued to have high BG  after having started Abilify over the summer and becoming more l sedentary, tired, lethargic, hungry, sleeping more since starting the medication with weight increasing to 172 lbs and average BG increased to 231 with high BG overnight.  We increased Tresiba to 27 units daily, carbohydrate coverage to 1 unit : 15 g of carbohydrate, continued 1unit Novolo mg /dl >150, and added  Empagliflozin 10 mg daily.    In late October he was doing well with this, but since has contacted me concerned about night sweats and I have asked him to check his BG over night.      Interim:    Miller reports that since our My Chart he has checked BG when awoke warm at night and it was fine.    And now that has resolved.  Sleeping well, no increased  warmth, fever or chills.  He did not sweat enough to dampen bedding at any point, just was warm and sweating and resolved after awoke.  Has not had in at least one week now.      Nothing else new at this time.     Feels comfortable with f/u  In late January as planned.      Dm Regimen:   - Tresiba 27 units /day.   -  carbohydrate coverage to 1 unit : 15 g of carbohydrate.   -  Correction Novolounit Novolo mg /dl >150     - Empagliflozin 10 mg daily.      We reviewed glucometer, pump and CGMS data together.  It revealed:        History of Diabetes monitoring and complications/ prevention:  CAD: yes 2019  Last eye exam results:20  - referred to retina specialists - considering injection.    Microalbuminuria: yes - has CKD, not currently on Ace or ARB, sees Cardiology , Neprhology  HTN: past  On statin: yes  On ASA:yes  Depression:yes  - sees psych  ED:did not inquire    Frandy's PMH reviewed with him.      Current Outpatient Medications   Medication     Acetaminophen (TYLENOL) 325 MG CAPS     ARIPiprazole (ABILIFY) 5 MG tablet     Artificial Tear Solution (SM ARTIFICIAL TEARS) SOLN     aspirin 81 MG EC tablet     BD VIKTORIA U/F 32G X 4 MM insulin pen needle     ciclopirox (LOPROX) 0.77 % cream     Continuous Blood Gluc  (FREESTYLE CLAUDIA 14 DAY READER) CECILIO     Continuous Blood Gluc Sensor (FREESTYLE CLAUDIA 14 DAY SENSOR) MISC     empagliflozin (JARDIANCE) 10 MG TABS tablet     ferrous sulfate (FEROSUL) 325 (65 Fe) MG tablet     glucose (BD GLUCOSE) 4 g chewable tablet     insulin aspart (NOVOLOG PEN) 100 UNIT/ML pen     insulin degludec (TRESIBA FLEXTOUCH) 100 UNIT/ML pen     lamiVUDine (EPIVIR) 100 MG tablet     magnesium oxide (MAG-OX) 400 MG tablet     Multiple Vitamin (THERAVITE PO)     order for DME     pantoprazole (PROTONIX) 40 MG EC tablet     polyethylene glycol (MIRALAX) 17 g packet     rosuvastatin (CRESTOR) 5 MG tablet     tacrolimus (GENERIC EQUIVALENT) 1 MG capsule     tamsulosin  (FLOMAX) 0.4 MG capsule     vitamin B-12 (CYANOCOBALAMIN) 1000 MCG tablet     vitamin D3 (CHOLECALCIFEROL) 2000 units (50 mcg) tablet     Current Facility-Administered Medications   Medication     Aflibercept (EYLEA) injection 2 mg              Frandy's family history includes Asthma in his sister; Cancer in his father, maternal grandmother, and mother; Cerebrovascular Disease in his mother; Colon Cancer (age of onset: 60) in his father; Colorectal Cancer in his father, maternal grandmother, and paternal grandmother; Depression in his mother and sister; Diabetes in his mother; Glaucoma in his father; Macular Degeneration in his father; Pancreatic Cancer (age of onset: 60) in his father; Prostate Cancer in his father and maternal grandfather; Skin Cancer in his father; Substance Abuse in his maternal grandfather and maternal grandmother; Thyroid Disease in his mother and sister.    ROS:   Patient denies any fevers, chills or sweats as well as any changes in or problems with vision, pain or problems with dentition, new or different headaches.  Patient denies symptoms of hypo and hyperglycemia except as above.   Patients denies marked fatigue, cough, shortness of breath, chest pain or pressure.  There has been no pain with or other changes in urination or  itching or pain in genital areas.  Patient denies any noted swelling in feet, ankles or otherwise, loss of sensation or pain  in feet or other areas.    Patient also denies current difficulties with depressed mood, anhedonia or worrying too much.        Exam:    PHONE VISIT        Wt Readings from Last 10 Encounters:   10/14/20 78 kg (171 lb 14.4 oz)   09/30/20 78.8 kg (173 lb 12.8 oz)   09/25/20 (P) 79 kg (174 lb 1.6 oz)   09/16/20 77.6 kg (171 lb)   08/19/20 76.9 kg (169 lb 8 oz)   08/12/20 76.8 kg (169 lb 4.8 oz)   07/29/20 75.6 kg (166 lb 9.6 oz)   07/28/20 75.8 kg (167 lb)   07/15/20 73.4 kg (161 lb 12.8 oz)   07/01/20 70.3 kg (155 lb)         Frandy is alerted  "and oriented.  Mood is \"good,\" affect is congruent.  Thoughtful form and content are fluid and coherent.  No signs of distress are appreciated.      Data:      Most recent:  Lab Results   Component Value Date    CR 1.95 10/14/2020     Lab Results   Component Value Date     10/14/2020       GFR Estimate   Date Value Ref Range Status   11/11/2020 43 (L) >60 mL/min/[1.73_m2] Final     Comment:     Non  GFR Calc  Starting 12/18/2018, serum creatinine based estimated GFR (eGFR) will be   calculated using the Chronic Kidney Disease Epidemiology Collaboration   (CKD-EPI) equation.     10/14/2020 37 (L) >60 mL/min/[1.73_m2] Final     Comment:     Non  GFR Calc  Starting 12/18/2018, serum creatinine based estimated GFR (eGFR) will be   calculated using the Chronic Kidney Disease Epidemiology Collaboration   (CKD-EPI) equation.     09/25/2020 52 (L) >60 mL/min/[1.73_m2] Final     Comment:     Non  GFR Calc  Starting 12/18/2018, serum creatinine based estimated GFR (eGFR) will be   calculated using the Chronic Kidney Disease Epidemiology Collaboration   (CKD-EPI) equation.       GFR Estimate If Black   Date Value Ref Range Status   11/11/2020 49 (L) >60 mL/min/[1.73_m2] Final     Comment:      GFR Calc  Starting 12/18/2018, serum creatinine based estimated GFR (eGFR) will be   calculated using the Chronic Kidney Disease Epidemiology Collaboration   (CKD-EPI) equation.     10/14/2020 43 (L) >60 mL/min/[1.73_m2] Final     Comment:      GFR Calc  Starting 12/18/2018, serum creatinine based estimated GFR (eGFR) will be   calculated using the Chronic Kidney Disease Epidemiology Collaboration   (CKD-EPI) equation.     09/25/2020 60 (L) >60 mL/min/[1.73_m2] Final     Comment:      GFR Calc  Starting 12/18/2018, serum creatinine based estimated GFR (eGFR) will be   calculated using the Chronic Kidney Disease Epidemiology " Collaboration   (CKD-EPI) equation.           Lab Results   Component Value Date    A1C 6.3 (H) 06/13/2020    A1C 6.6 (H) 08/08/2018    A1C 6.5 (H) 06/09/2017    A1C 7.8 (H) 10/25/2016    HEMOGLOBINA1 4.8 03/04/2020    HEMOGLOBINA1 5.1 12/02/2019    HEMOGLOBINA1 5.4 08/14/2019    HEMOGLOBINA1 6.1 (A) 02/11/2019    HEMOGLOBINA1 8.1 (A) 04/11/2018     Lab Results   Component Value Date    MICROL 196 06/29/2020     No results found for: MICROALBUMIN  No results found for: CPEPT, GADAB, ISCAB  Cholesterol   Date Value Ref Range Status   09/25/2020 146 <200 mg/dL Final   07/29/2020 192 <200 mg/dL Final     HDL Cholesterol   Date Value Ref Range Status   09/25/2020 39 (L) >39 mg/dL Final   07/29/2020 39 (L) >39 mg/dL Final     LDL Cholesterol Calculated   Date Value Ref Range Status   09/25/2020 61 <100 mg/dL Final     Comment:     Desirable:       <100 mg/dl   07/29/2020 117 (H) <100 mg/dL Final     Comment:     Above desirable:  100-129 mg/dl  Borderline High:  130-159 mg/dL  High:             160-189 mg/dL  Very high:       >189 mg/dl       Triglycerides   Date Value Ref Range Status   09/25/2020 228 (H) <150 mg/dL Final     Comment:     Borderline high:  150-199 mg/dl  High:             200-499 mg/dl  Very high:       >499 mg/dl     07/29/2020 181 (H) <150 mg/dL Final     Comment:     Borderline high:  150-199 mg/dl  High:             200-499 mg/dl  Very high:       >499 mg/dl       No results found for: CHOLHDLRATIO                Assessment/Plan:    Frandy is a 55 year old male with  has a past medical history of Anemia (2013), Arthritis, BPH (benign prostatic hyperplasia), CAD (coronary artery disease) (4/1/2019), Cholelithiasis, Conductive hearing loss (08/16/2017), Depressive disorder (1986), Gastroesophageal reflux disease (12/01/2014), HCC (hepatocellular carcinoma) (H) (1/22/2019), History of diabetic retinopathy (07/2018), HTN (hypertension), HTN (hypertension) (11/20/2019), Hyperlipidemia, Liver cirrhosis  secondary to ESTRADA (H), Liver transplanted (H) (2019), Portal vein thrombosis (2019), and Type II diabetes mellitus (H). He also has no past medical history of Asymptomatic human immunodeficiency virus (HIV) infection status (H), Cerebral infarction (H), Congestive heart failure (H), COPD (chronic obstructive pulmonary disease) (H), History of blood transfusion, Infectious mononucleosis, PONV (postoperative nausea and vomiting), Thyroid disease, Tuberculosis, or Uncomplicated asthma.      DM2, well managed.    Recommend continue:     - Tresiba 27 units /day.   -  carbohydrate coverage to 1 unit : 15 g of carbohydrate.   -  Correction Novolounit Novolo mg /dl >150     - Empagliflozin 10 mg daily.  Add exercise early in day.  Reccomend a mix of weight bearing and aerobic exercise to meet ADA recommendations.      Consider nutrition heart healthy diet, weight maintenance diet.      2. DM Complications-     Lifestyle modification focusing on application of a Mediterranean diet or Dietary Approaches to Stop Hypertension (DASH) dietary pattern; reduction of saturated fat and trans fat; increase of dietary n-3 fatty acids, viscous fiber, and plant stanols/sterols intake; and increased physical activity recommended to improve the lipid profile and reduce the risk of developing atherosclerotic cardiovascular disease.     Retinopathy:  Yes.  Seeing retina specialist.    Nephropathy:  No current elevated BP.  Creatinine stable. Consider ACe I / ARB  Neuropathy: No.    Feet: OK, no ulcers or lesions.   Lipids:   Patient taking a statin.    3. Night sweats:  If recur, please note any other symptoms and advised discuss with primary care and psychiatry. I will also consider TSHR.        >50% of 17 minute visit spent in counseling, education and coordination of care related to healthy lifestyle, prevention of diabetic complications, options for better glycemic control as well as preventing, detecting, and  treating hypoglycemia.      It is my privilege to be involved in the care of the above patient.     Tish Toscano PA-C, MPAS  [facility]  Diabetes, Endocrinology, and Metabolism  [phone] Appointments/Nurse  [phone] pager  [phone] nurse line    This note was completed in part using Dragon voice recognition, and may contain word and grammatical errors.

## 2020-11-20 ENCOUNTER — DOCUMENTATION ONLY (OUTPATIENT)
Dept: ONCOLOGY | Facility: CLINIC | Age: 55
End: 2020-11-20

## 2020-11-20 ENCOUNTER — TELEPHONE (OUTPATIENT)
Dept: GASTROENTEROLOGY | Facility: CLINIC | Age: 55
End: 2020-11-20

## 2020-11-20 NOTE — PROGRESS NOTES
STD forms received via fax from [facility].      Forms to be completed and put in folder for provider to approve.    Fax #:  [phone]  Claim: [id number]    Hannah Rogers CMA ([facility])

## 2020-11-20 NOTE — TELEPHONE ENCOUNTER
[facility] Call Center    Phone Message    May a detailed message be left on voicemail: yes     Reason for Call: Other: Insurance called asking if the [employer] Insurance has gone through. Please call back at: [phone]. Reference Number: [id number]. Thanks!     Action Taken: Message routed to:  [facility] ([facility]): hep    Travel Screening: Not Applicable

## 2020-11-23 ENCOUNTER — TELEPHONE (OUTPATIENT)
Dept: PHARMACY | Facility: CLINIC | Age: 55
End: 2020-11-23

## 2020-11-23 DIAGNOSIS — K75.81 LIVER CIRRHOSIS SECONDARY TO NASH (H): ICD-10-CM

## 2020-11-23 DIAGNOSIS — K74.60 LIVER CIRRHOSIS SECONDARY TO NASH (H): ICD-10-CM

## 2020-11-23 DIAGNOSIS — Z94.4 STATUS POST LIVER TRANSPLANTATION (H): ICD-10-CM

## 2020-11-23 DIAGNOSIS — B19.10 HEPATITIS B: ICD-10-CM

## 2020-11-23 RX ORDER — LAMIVUDINE 100 MG/1
100 TABLET, FILM COATED ORAL DAILY
Qty: 30 TABLET | Refills: 3 | Status: SHIPPED | OUTPATIENT
Start: 2020-11-23 | End: 2021-03-29

## 2020-11-23 NOTE — TELEPHONE ENCOUNTER
Clinical Pharmacy Consult:                                                      Transplant Specific:  1 Year Post Transplant Medication Review  Date of Transplant: 11/11/2019  Type of Transplant: liver  First Transplant: yes  History of rejection: no    Immunosuppression Regimen   TAC 3mg qAM & 2mg qPM  Patient specific goal: 5-6  Most recent level: 7.5, date 11/11/20  Immunosuppressant Levels:  Supratherapeutic, dose reduced  Pt adherent to lab draws: yes  Scr:   Lab Results   Component Value Date    CR 1.16 05/21/2020     Side effects: Tired    Prophylactic Medications  Antibacterial:  Completed    Antifungal: Not needed thus far    Antiviral: Valcyte completed     Acid Reducer: Protonix (Pantoprazole)  Scheduled Reviewed Date: up to MD    Thrombosis Prevention: Aspirin 81 mg PO daily  Scheduled Discontinue Date: up to MD    Blood Pressure Management  Frequency of home Blood Pressure checks: Rarely  Most recent home BP: Last OV it was 105/78  Patient Blood pressure goal: <140/90  Patient blood pressure at goal:  yes  Hospitalizations/ER visits since last assessment: 1 for diabetes and bipolar episode    Med rec/DUR performed: yes  Med Rec Discrepancies: No    Miller is doing ok. He states he has been feeling really tired and still has some pain at times around his scar.  No other side effects from his medications at this time.  He sets up his pill box on Fridays and takes his medications each day at 8am and 8pm.  He has not missed any doses since May.  He knew his medications well.  He rarely takes his blood pressure. We talked about checking it a couple of times per week. He was concerned he would not be able to use our pharmacy in January, but I assured him we are contracted with his insurance.  He does not need to change pharmacies.  He was greatly relieved.      No other questions or concerns today.    Sera Moncada, LesD

## 2020-11-23 NOTE — PROGRESS NOTES
STD forms filled out and put in providers folder for review and signature.      Hannah Rogers CMA ([facility])

## 2020-11-24 ENCOUNTER — TELEPHONE (OUTPATIENT)
Dept: TRANSPLANT | Facility: CLINIC | Age: 55
End: 2020-11-24

## 2020-11-24 RX ORDER — FERROUS SULFATE 325(65) MG
325 TABLET ORAL
Qty: 90 TABLET | Refills: 3 | OUTPATIENT
Start: 2020-11-24

## 2020-11-24 NOTE — PROGRESS NOTES
Dr Villarreal would like pt STD forms sent to his transplant team.  Form was faxed back to [employer] with this information on.    Message was left of pt voicemail relaying this.    Hannah Rogers CMA ([facility])

## 2020-11-25 ENCOUNTER — OFFICE VISIT (OUTPATIENT)
Dept: OPHTHALMOLOGY | Facility: CLINIC | Age: 55
End: 2020-11-25
Attending: OPHTHALMOLOGY
Payer: MEDICARE

## 2020-11-25 DIAGNOSIS — E11.3313 TYPE 2 DIABETES MELLITUS WITH MODERATE NONPROLIFERATIVE RETINOPATHY OF BOTH EYES AND MACULAR EDEMA, UNSPECIFIED WHETHER LONG TERM INSULIN USE (H): Primary | ICD-10-CM

## 2020-11-25 PROCEDURE — G0463 HOSPITAL OUTPT CLINIC VISIT: HCPCS

## 2020-11-25 PROCEDURE — 92134 CPTRZ OPH DX IMG PST SGM RTA: CPT | Performed by: OPHTHALMOLOGY

## 2020-11-25 PROCEDURE — 92012 INTRM OPH EXAM EST PATIENT: CPT | Performed by: OPHTHALMOLOGY

## 2020-11-25 PROCEDURE — 92235 FLUORESCEIN ANGRPH MLTIFRAME: CPT | Performed by: OPHTHALMOLOGY

## 2020-11-25 ASSESSMENT — REFRACTION_WEARINGRX
OD_SPHERE: -7.00
OD_CYLINDER: +0.75
OS_SPHERE: -6.75
OS_CYLINDER: +0.50
SPECS_TYPE: PAL
OS_AXIS: 044
OS_ADD: +2.00
OD_ADD: +2.00
OD_AXIS: 131

## 2020-11-25 ASSESSMENT — VISUAL ACUITY
OS_CC: 20/25
METHOD: SNELLEN - LINEAR
OD_CC: 20/20
OS_CC+: -1
CORRECTION_TYPE: GLASSES
OD_CC+: -3

## 2020-11-25 ASSESSMENT — CONF VISUAL FIELD
OS_NORMAL: 1
OD_NORMAL: 1

## 2020-11-25 ASSESSMENT — CUP TO DISC RATIO
OD_RATIO: 0.25
OS_RATIO: 0.3

## 2020-11-25 ASSESSMENT — TONOMETRY
IOP_METHOD: TONOPEN
OD_IOP_MMHG: 16
OS_IOP_MMHG: 12

## 2020-11-25 ASSESSMENT — EXTERNAL EXAM - LEFT EYE: OS_EXAM: NORMAL

## 2020-11-25 ASSESSMENT — EXTERNAL EXAM - RIGHT EYE: OD_EXAM: NORMAL

## 2020-11-25 ASSESSMENT — SLIT LAMP EXAM - LIDS
COMMENTS: SMALL PAPILLOMA ALONG LL MARGIN
COMMENTS: NORMAL

## 2020-11-25 NOTE — PROGRESS NOTES
CC:  Follow up Diabetic macular edema  left eye     HPI: Frandy Workman is a  55 year old year-old patient with history of Diabetes mellitus for 24 ys . Patient on insulin.  Patient was evaluated by dr. Amezquita and sent to the retina clinic for management of Diabetic macular edema     Interval History: Patient will be traveling for the next couple of months. last eye examination 3/2020 and received Eylea right eye. Vision is stable if not a little better compared to then. Glasses have been updated and he is happy with his vision.   Hemoglobin A1C   Date Value Ref Range Status   06/13/2020 6.3 (H) 0 - 5.6 % Final     Comment:     Normal <5.7% Prediabetes 5.7-6.4%  Diabetes 6.5% or higher - adopted from ADA   consensus guidelines.         Retinal Imaging:  OCT 11-25-20  RE: mild Diabetic macular edema, stable  LE: increased Diabetic macular edema, mild Epiretinal membrane, stable PAMM inferotemporal to fovea    fluorescein angiography transits right eye 11-25-20  Right eye: diffuse microaneurysms, late mild macular leakage; peripheral peripheral capillary non perfusion;   vessel leakage in late phase, no NVD/NVE vs early SN neovascularization elsewhere??  Left eye: diffuse microaneurysms, late mild macula leakage; mild peripheral capillary non perfusion;  mild vessel leakage in late phase, no NVD/NVE    Optos consistent with clinical exam    Assessment & Plan:    1.  severe nonproliferative diabetic retinopathy of both eyes    - HbA1c 6.3% (6/2020)   - fluorescein angiography with increased peripheral leakage and capillary non perfusion compared to march, 2019   - Blood pressure (<120/80) and blood glucose (HbA1c <7.0, ~6.5 today) control discussed with patient. Patient advised that failure to adequately control each may lead to vision loss. The patient expressed understanding.   - observe for now and could consider peripheral laser if worsening      2. Diabetic macular edema both eyes    - status post avastin x  4. Switch to Eylea 4/24/19 with improvement of Diabetic macular edema    - now with slightly increased Diabetic macular edema left eye    - Last Eylea injection was 3/2020    - observe both eye for now. Consider re-starting anti-vegf if worsening          3. Myopia, bilateral  Prescription given today (10/9/19)     4. Senile nuclear sclerosis, bilateral  Comment: Not visually significant OU    5. Dry eye syndrome   Left eye worse than right eye   warm compresses and artificial tears  As needed  -punctal plugs previously left eye - reports this is better     6. Status post liver transplant   - tx 11/2019   - severe anemia; s/p transfusion - anemia much improved    - being seen by his primary team      Plan: follow up 10-12 weeks for Optical Coherence Tomography and possible Eylea each eye    ~~~~~~~~~~~~~~~~~~~~~~~~~~~~~~~~~~   Complete documentation of historical and exam elements from today's encounter can be found in the full encounter summary report (not reduplicated in this progress note).  I personally obtained the chief complaint(s) and history of present illness.  I confirmed and edited as necessary the review of systems, past medical/surgical history, family history, social history, and examination findings as documented by others; and I examined the patient myself.  I personally reviewed the relevant tests, images, and reports as documented above.  I formulated and edited as necessary the assessment and plan and discussed the findings and management plan with the patient and family    Enriqueta Martin MD   of Ophthalmology.  Retina Service   Department of Ophthalmology and Visual Neurosciences   [facility]  Phone: [phone]   Fax: [phone]

## 2020-11-25 NOTE — NURSING NOTE
"Chief Complaints and History of Present Illnesses   Patient presents with     Diabetic Macular Edema Follow Up     6 week f/u      Chief Complaint(s) and History of Present Illness(es)     Diabetic Macular Edema Follow Up     Laterality: both eyes    Quality: blurred vision    Associated symptoms: itching (comes and goes, not a new thing per pt ) and floaters (occ hs per pt).  Negative for eye pain and flashes    Pain scale: 0/10    Comments: 6 week f/u               Comments     Pt notes that VA in the LE is still not as strong, not currently using any gtts, having itching today, he notes that this comes an goes.   Last BGL: 192 this morning, usually below 150 in the morning, pt notes he ate late last night.  Last A1C: \"around 7\" per pt more recently than June  Lab Results       Component                Value               Date                       A1C                      6.3                 06/13/2020                 A1C                      6.6                 08/08/2018                 A1C                      6.5                 06/09/2017                 A1C                      7.8                 10/25/2016                Sarah Kurtz COT November 25, 2020 8:48 AM                  "

## 2020-12-02 ENCOUNTER — INFUSION THERAPY VISIT (OUTPATIENT)
Dept: INFUSION THERAPY | Facility: CLINIC | Age: 55
End: 2020-12-02
Payer: MEDICARE

## 2020-12-02 ENCOUNTER — TELEPHONE (OUTPATIENT)
Dept: PHARMACY | Facility: CLINIC | Age: 55
End: 2020-12-02

## 2020-12-02 DIAGNOSIS — C22.0 HCC (HEPATOCELLULAR CARCINOMA) (H): Primary | ICD-10-CM

## 2020-12-02 DIAGNOSIS — N18.4 ANEMIA DUE TO STAGE 4 CHRONIC KIDNEY DISEASE (H): ICD-10-CM

## 2020-12-02 DIAGNOSIS — D63.1 ANEMIA OF CHRONIC RENAL FAILURE, STAGE 4 (SEVERE) (H): ICD-10-CM

## 2020-12-02 DIAGNOSIS — Z94.4 LIVER REPLACED BY TRANSPLANT (H): ICD-10-CM

## 2020-12-02 DIAGNOSIS — N18.4 CKD (CHRONIC KIDNEY DISEASE) STAGE 4, GFR 15-29 ML/MIN (H): Primary | ICD-10-CM

## 2020-12-02 DIAGNOSIS — D63.1 ANEMIA DUE TO STAGE 4 CHRONIC KIDNEY DISEASE (H): ICD-10-CM

## 2020-12-02 DIAGNOSIS — N18.4 ANEMIA OF CHRONIC RENAL FAILURE, STAGE 4 (SEVERE) (H): ICD-10-CM

## 2020-12-02 LAB
ALBUMIN SERPL-MCNC: 4.3 G/DL (ref 3.4–5)
ALP SERPL-CCNC: 145 U/L (ref 40–150)
ALT SERPL W P-5'-P-CCNC: 36 U/L (ref 0–70)
ANION GAP SERPL CALCULATED.3IONS-SCNC: 4 MMOL/L (ref 3–14)
AST SERPL W P-5'-P-CCNC: 25 U/L (ref 0–45)
BILIRUB DIRECT SERPL-MCNC: 0.1 MG/DL (ref 0–0.2)
BILIRUB SERPL-MCNC: 0.8 MG/DL (ref 0.2–1.3)
BUN SERPL-MCNC: 37 MG/DL (ref 7–30)
CALCIUM SERPL-MCNC: 9.4 MG/DL (ref 8.5–10.1)
CHLORIDE SERPL-SCNC: 104 MMOL/L (ref 94–109)
CO2 SERPL-SCNC: 28 MMOL/L (ref 20–32)
CREAT SERPL-MCNC: 1.55 MG/DL (ref 0.66–1.25)
ERYTHROCYTE [DISTWIDTH] IN BLOOD BY AUTOMATED COUNT: 14.3 % (ref 10–15)
FERRITIN SERPL-MCNC: 433 NG/ML (ref 26–388)
GFR SERPL CREATININE-BSD FRML MDRD: 49 ML/MIN/{1.73_M2}
GLUCOSE SERPL-MCNC: 261 MG/DL (ref 70–99)
HCT VFR BLD AUTO: 37.7 % (ref 40–53)
HGB BLD-MCNC: 12.9 G/DL (ref 13.3–17.7)
IRON SATN MFR SERPL: 33 % (ref 15–46)
IRON SERPL-MCNC: 75 UG/DL (ref 35–180)
MAGNESIUM SERPL-MCNC: 2 MG/DL (ref 1.6–2.3)
MCH RBC QN AUTO: 33.2 PG (ref 26.5–33)
MCHC RBC AUTO-ENTMCNC: 34.2 G/DL (ref 31.5–36.5)
MCV RBC AUTO: 97 FL (ref 78–100)
PLATELET # BLD AUTO: 131 10E9/L (ref 150–450)
POTASSIUM SERPL-SCNC: 5.3 MMOL/L (ref 3.4–5.3)
PROT SERPL-MCNC: 8.3 G/DL (ref 6.8–8.8)
RBC # BLD AUTO: 3.88 10E12/L (ref 4.4–5.9)
SODIUM SERPL-SCNC: 136 MMOL/L (ref 133–144)
TACROLIMUS BLD-MCNC: 7.4 UG/L (ref 5–15)
TIBC SERPL-MCNC: 228 UG/DL (ref 240–430)
TME LAST DOSE: NORMAL H
WBC # BLD AUTO: 4.7 10E9/L (ref 4–11)

## 2020-12-02 PROCEDURE — 80197 ASSAY OF TACROLIMUS: CPT | Performed by: INTERNAL MEDICINE

## 2020-12-02 PROCEDURE — 82728 ASSAY OF FERRITIN: CPT | Performed by: INTERNAL MEDICINE

## 2020-12-02 PROCEDURE — 80048 BASIC METABOLIC PNL TOTAL CA: CPT | Performed by: INTERNAL MEDICINE

## 2020-12-02 PROCEDURE — 80076 HEPATIC FUNCTION PANEL: CPT | Performed by: INTERNAL MEDICINE

## 2020-12-02 PROCEDURE — 83550 IRON BINDING TEST: CPT | Performed by: INTERNAL MEDICINE

## 2020-12-02 PROCEDURE — 83540 ASSAY OF IRON: CPT | Performed by: INTERNAL MEDICINE

## 2020-12-02 PROCEDURE — 36415 COLL VENOUS BLD VENIPUNCTURE: CPT | Performed by: INTERNAL MEDICINE

## 2020-12-02 PROCEDURE — 85027 COMPLETE CBC AUTOMATED: CPT | Performed by: INTERNAL MEDICINE

## 2020-12-02 PROCEDURE — 83735 ASSAY OF MAGNESIUM: CPT | Performed by: INTERNAL MEDICINE

## 2020-12-02 PROCEDURE — 99207 PR NO CHARGE LOS: CPT

## 2020-12-02 NOTE — PROGRESS NOTES
Infusion Nursing Note:  Frandy Workman presents today for Aranesp- NOT GIVEN    Patient seen by provider today: No   present during visit today: Not Applicable.    Note: Patient here for aranesp. Hgb 12.9 so did not receive aranesp    Treatment Conditions:  Lab Results   Component Value Date    HGB 12.9 12/02/2020     Lab Results   Component Value Date    WBC 4.7 12/02/2020      Lab Results   Component Value Date    ANEU 2.1 07/29/2020     Lab Results   Component Value Date     12/02/2020      Lab Results   Component Value Date     12/02/2020                   Lab Results   Component Value Date    POTASSIUM 5.3 12/02/2020           Lab Results   Component Value Date    MAG 2.0 12/02/2020            Lab Results   Component Value Date    CR 1.55 12/02/2020                   Lab Results   Component Value Date    DEBORAH 9.4 12/02/2020                Lab Results   Component Value Date    BILITOTAL 0.8 12/02/2020           Lab Results   Component Value Date    ALBUMIN 4.3 12/02/2020                    Lab Results   Component Value Date    ALT 36 12/02/2020           Lab Results   Component Value Date    AST 25 12/02/2020       Results reviewed, labs did NOT meet treatment parameters: Treatment parameters to give aranesp if hgb <11.5.              Discharge Plan:   Patient discharged in stable condition accompanied by: self.  Departure Mode: Ambulatory.  Stopped at scheduling desk to make next appts.    CANDIDO AGULIAR RN

## 2020-12-02 NOTE — TELEPHONE ENCOUNTER
Referred by Dr. Eloy Rao on 3/2/2020    Hgb has been stable since 07/29/2020 with no intervention.    Patient meets criteria for discharge from [facility] with at least 12 weeks without iron or IVELISSE therapy.    Anemia Latest Ref Rng & Units 9/16/2020 9/25/2020 9/30/2020 10/14/2020 10/28/2020 11/11/2020 12/2/2020   IVELISSE Dose - - - - - - - -   Hemoglobin 13.3 - 17.7 g/dL 11.8(L) 11.8(L) 12.3(L) 13.0(L) 12.5(L) 12.8(L) 12.9(L)   TSAT 15 - 46 % 42 - - 29 - 36 33   Ferritin 26 - 388 ng/mL 223 - - 323 - 286 433(H)   PRBCs - - - - - - - -       Anemia labs discontinued, therapy plans cancelled, removed from active patient list, and referring provider notified.      Jie Blum RN   [facility]  [address] [address]   [email or web address]   Office : [phone]  Fax: [phone]

## 2020-12-03 ENCOUNTER — VIRTUAL VISIT (OUTPATIENT)
Dept: BEHAVIORAL HEALTH | Facility: CLINIC | Age: 55
End: 2020-12-03
Payer: MEDICARE

## 2020-12-03 DIAGNOSIS — Z94.4 LIVER TRANSPLANT RECIPIENT (H): ICD-10-CM

## 2020-12-03 DIAGNOSIS — F31.31 BIPOLAR 1 DISORDER, DEPRESSED, MILD (H): Primary | ICD-10-CM

## 2020-12-03 DIAGNOSIS — F41.1 GENERALIZED ANXIETY DISORDER: ICD-10-CM

## 2020-12-03 PROCEDURE — 90791 PSYCH DIAGNOSTIC EVALUATION: CPT | Mod: 95 | Performed by: PSYCHOLOGIST

## 2020-12-03 RX ORDER — TACROLIMUS 1 MG/1
2 CAPSULE ORAL EVERY 12 HOURS
Qty: 360 CAPSULE | Refills: 3 | Status: SHIPPED | OUTPATIENT
Start: 2020-12-03 | End: 2021-04-15

## 2020-12-03 NOTE — TELEPHONE ENCOUNTER
ISSUE:   Tacrolimus IR level 7.4 on 12/2, goal 5-6 dose 3 mg AM and 2 mg PM.    PLAN:   Please call patient and confirm this was an accurate 12-hour trough. Verify Tacrolimus IR dose 3 mg AM and 2 mg pM. Confirm no new medications or illness. Confirm no missed doses. If accurate trough and accurate dose, decrease Tacrolimus IR dose to 2 mg every 12 hours and repeat labs in 1 month.    OUTCOME:   Spoke with patient, they confirm accurate trough level and current dose 3 mg am, 2 mg pm. Patient confirmed dose change to 2 mg BID and to repeat labs in 1 months. Orders sent to preferred pharmacy for dose change and lab for repeat labs. Patient voiced understanding of plan.     Deena Marshall LPN

## 2020-12-04 NOTE — PROGRESS NOTES
[facility]: Integrated Behavioral Health  Provider Name: Joseph Ho Ho Kus     Credentials:  RED Naranjo    PATIENT'S NAME: Frandy Workman  PREFERRED NAME: Jess  PRONOUNS:       MRN: [id number]  : 1965   ACCT. NUMBER:  [id number]  DATE OF SERVICE: 20  START TIME: 3:00  END TIME: 3:45  PREFERRED PHONE: [phone]  May we leave a program related message: Yes  SERVICE MODALITY:  Video Visit:      Provider verified identity through the following two step process.  Patient provided:  Patient photo    Telemedicine Visit: The patient's condition can be safely assessed and treated via synchronous audio and visual telemedicine encounter.      Reason for Telemedicine Visit: Services only offered telehealth    Originating Site (Patient Location): Patient's home    Distant Site (Provider Location): Provider Remote Setting    Consent:  The patient/guardian has verbally consented to: the potential risks and benefits of telemedicine (video visit) versus in person care; bill my insurance or make self-payment for services provided; and responsibility for payment of non-covered services.     Patient would like the video invitation sent by: Send to e-mail at: [email or web address]    Mode of Communication:  Video Conference via [employer]well    As the provider I attest to compliance with applicable laws and regulations related to telemedicine.    UNIVERSAL ADULT Mental Health DIAGNOSTIC ASSESSMENT      Identifying Information:  Patient is a 55 year old, .  The pronoun use throughout this assessment reflects the patient's chosen pronoun.  Patient was referred for an assessment by self.  Patient attended the session alone.     Chief Complaint:   The reason for seeking services at this time is: Ongoing mental health care for history of bipolar disorder, depression, and anxious distress. The patient initially met with this writer on 2020 for brief psychotherapy and help establishing  with a long-term provider for mental health care. Around this time, on 6/12/20, the patient was admitted on a 72-hour hold due to manic symptomology (see his medical record for more details). This writer had completed four sessions with the patient before referring him to [facility], as at that time he had requested referrals for ongoing psychiatric care outside the [facility] system. More recently, the patient returned to this writer for care on 9/30/2020 where brief therapy with the pursuit of helping him find long-term mental health care was resumed. Patient was offered to continue working with this writer moving forward due to an established psychotherapeutic relationship, to which he agreed. Upon meeting recently, the patient expressed concerns about anxiety and low mood, citing several psychosocial stressors that were bothering him such as returning to court for a restraining order violation. He cited additional concern about being distant from his daughter. Patient has attempted to resolve these concerns in the past through counseling and psychiatry.    Social/Family History:  Patient reported they grew up in [place], [place].  They were raised by biological parents.  He has one sister with a history of alcohol use difficulties.     The patient describes their cultural background as White.  Cultural influences and impact on patient's life structure, values, norms, and healthcare: none.  Contextual influences on patient's health include: none.  These factors will be addressed in the Preliminary Treatment plan.  Patient identified their preferred language to be English. Patient reported they does not need the assistance of an  or other support involved in therapy.     Patient reported had no significant delays in developmental tasks.   Patient's highest education level was college graduate. Patient identified the following learning problems: none reported.  Modifications will not be used to assist communication in therapy. Patient reports they are  able to understand written materials.    Patient reported the following relationship history he was  once,  in ; records show his wife  in an MVA, reportedly.  Patient's current relationship status is  for 11 years.   Patient identified their sexual orientation as heterosexual.  Patient reported having one child(linda). He has one adult daughter, with whom the patient has a no-contact order from the court. Patient identified friends as part of their support system.  Patient identified the quality of these relationships as inconsistent.      Patient's current living/housing situation involves staying in own home/apartment.  They live alone and they report that housing is stable.     Patient is currently disabled.  Patient reports their finances are obtained through disability and previous employment. Patient does not identify finances as a current stressor.      Patient reported that they have been involved with the legal system. Records showed he was arrested in  for domestic violence charges and he has a history of harrassment orders placed against him.     Patient's Strengths and Limitations:  Patient identified the following strengths or resources that will help them succeed in treatment: commitment to health and well being, intelligence and work ethic. Things that may interfere with the patient's success in treatment include: few friends and lack of social support.     Personal and Family Medical History:   Patient did report a family history of mental health concerns.  Patient reports family history includes Asthma in his sister; Cancer in his father, maternal grandmother, and mother; Cerebrovascular Disease in his mother; Colon Cancer (age of onset: 60) in his father; Colorectal Cancer in his father, maternal grandmother, and paternal grandmother; Depression in his mother and sister;  Diabetes in his mother; Glaucoma in his father; Macular Degeneration in his father; Pancreatic Cancer (age of onset: 60) in his father; Prostate Cancer in his father and maternal grandfather; Skin Cancer in his father; Substance Abuse in his maternal grandfather and maternal grandmother; Thyroid Disease in his mother and sister.    Patient does report Mental Health Diagnosis and/or Treatment.  Patient Patient reported the following previous diagnoses which include(s): a Bipolar Disorder.  Records showed he has been taking medications for bipolar disorder since 2014.  Patient has received mental health services in the past: inpatient mental health services at [facility], Behavioral Health Clinician and psychiatry with [facility]. Psychiatric Hospitalizations: [facility] on 6/12/20.  Patient denies a history of civil commitment.  Currently, patient receives outpatient medication for mood symptoms.      Records show patient has completed five previous psychiatric admissions - prior to June of 2020, the last occurred reportedly in [place] in 2014.    Patient has had a physical exam to rule out medical causes for current symptoms.  Date of last physical exam was within the past year. Client was encouraged to follow up with PCP if symptoms were to develop. The patient has a [facility] Provider, who is named Maximo Tucker. Patient reports the following current medical concerns: see chart.  Patient denies any issues with pain.  There are not significant appetite / nutritional concerns / weight changes.   Patient does not report a history of head injury / trauma / cognitive impairment.      See chart for medication information    Medication Adherence:  Patient reports taking prescribed medications as prescribed.    Patient Allergies:    Allergies   Allergen Reactions     Codeine Other (See Comments)     Cannot take  due to liver  Cannot tolerate oral narcotics     Seasonal Allergies      Sneezing, coughing, runny and itchy eyes       Medical History:    Past Medical History:   Diagnosis Date     Anemia 2013     Arthritis      BPH (benign prostatic hyperplasia)      CAD (coronary artery disease) 4/1/2019     Cholelithiasis      Conductive hearing loss 08/16/2017     Depressive disorder 1986    Suffer effects throughout life     Gastroesophageal reflux disease 12/01/2014     HCC (hepatocellular carcinoma) (H) 1/22/2019     History of diabetic retinopathy 07/2018     HTN (hypertension)      HTN (hypertension) 11/20/2019     Hyperlipidemia      Liver cirrhosis secondary to ESTRADA (H)      Liver transplanted (H) 11/11/2019     Portal vein thrombosis 4/11/2019     Type II diabetes mellitus (H)          Current Mental Status Exam:   Appearance:  Appropriate    Eye Contact:  Good   Psychomotor:  Normal       Gait / station:  no problem  Attitude / Demeanor: Cooperative  Friendly Pleasant  Speech      Rate / Production: Normal/ Responsive      Volume:  Normal  volume      Language:  no problems  Mood:   Sad   Affect:   Appropriate    Thought Content: Clear   Thought Process: Logical       Associations: No loosening of associations  Insight:   Fair   Judgment:  Intact   Orientation:  All  Attention/concentration: Good    Rating Scales:    PHQ9:    PHQ-9 SCORE 1/9/2020 10/13/2020   PHQ-9 Total Score MyChart - 8 (Mild depression)   PHQ-9 Total Score 16 8   ;    GAD7:    LIZZETTE-7 SCORE 10/13/2020   Total Score 6 (mild anxiety)   Total Score 6     CGI:     First:Considering your total clinical experience with this particular patient population, how severe are the patient's symptoms at this time?: 7 (6/22/2020  3:14 PM)  ;    Most recentCompared to the patient's condition at the START of treatment, this patient's condition is: 3 (6/22/2020  3:14 PM)      Substance Use:  Patient did report a family history of substance use concerns; see medical  history section for details.  Patient has not received chemical dependency treatment in the past.  Patient has not ever been to detox.      Patient is not currently receiving any chemical dependency treatment. Patient reported the following problems as a result of their substance use: none.    Patient denies using alcohol. Records show he does have a history of heavy alcohol use; no current use however  Patient denies using tobacco.  Patient denies using marijuana.  Patient denies using caffeine.- no excessive use  Patient reports using/abusing the following substance(s). Patient reported no other substance use.     Substance Use: No symptoms    Based on the  clinical interview there  are not indications of drug or alcohol abuse.      Significant Losses / Trauma / Abuse / Neglect Issues:   Patient did not serve in the .  There are indications or report of significant loss, trauma, abuse or neglect issues related to: are no indications and client denies any losses, trauma, abuse, or neglect concerns.  Concerns for possible neglect are not present.     Safety Assessment:   Current Safety Concerns: none  [place] Suicide Severity Rating Scale (Lifetime/Recent)  [place] Suicide Severity Rating (Lifetime/Recent) 6/23/2020 6/23/2020 6/24/2020 6/24/2020 6/25/2020 6/25/2020 6/26/2020   1. Wish to be Dead (Lifetime) - - - - - - -   1. Wish to be Dead (Recent) No No No No No No No   Wish to be Dead Description (Recent) - denies denies - - denies -   2. Non-Specific Active Suicidal Thoughts (Lifetime) - - - - - - -   2. Non-Specific Active Suicidal Thoughts (Recent) No No No No No No No   Non-Specific Active Suicidal Thought Description (Recent) - denies denies - - denies -   3. Active Suicidal Ideation with any Methods (Not Plan) Without Intent to Act (Lifetime) - - - - - - -   3. Active Sucidal Ideation with any Methods (Not Plan) Without Intent to Act (Recent) - No No - - No No   Active Suicidal Ideation with any  Methods (Not Plan) Description (Recent) - denies denies - - denies -   4. Active Suicidal Ideation with Some Intent to Act, Without Specific Plan (Lifetime) - - - - - - -   4. Active Suicidal Ideation with Some Intent to Act, Without Specific Plan (Recent) - No No - - No No   Active Suicidal Ideation with Some Intent to Act, Without Specific Plan Description (Recent) - denies denies - - denies -   5. Active Suicidal Ideation with Specific Plan and Intent (Lifetime) - - - - - - -   5. Active Suicidal Ideation with Specific Plan and Intent (Recent) - No No - - No No   Active Suicidal Ideation with Specific Plan and Intent Description (Recent) - - denies - - denies -   Most Severe Ideation Rating (Lifetime) - - - - - - -   Most Severe Ideation Description (Lifetime) - - - - - - -   Frequency (Lifetime) - - - - - - -   Duration (Lifetime) - NA - - - NA -   Controllability (Lifetime) - - - - - - -   Protective Factors  (Lifetime) - - - - - - -   Reasons for Ideation (Lifetime) - - - - - - -   Most Severe Ideation Rating (Past Month) - - - - - - -   Most Severe Ideation Description (Past Month) - - - - - - -   Frequency (Past Month) - - - - - - -   Duration (Past Month) - - - - - - -   Controllability (Past Month) - - - - - - -   Protective Factors (Past Month) - - - - - - -   Reasons for Ideation (Past Month) - - - - - - -   Actual Attempt (Lifetime) - - - - - - -   Actual Attempt (Past 3 Months) - - - - - - -   Has subject engaged in non-suicidal self-injurious behavior? (Lifetime) - - - - - - -   Has subject engaged in non-suicidal self-injurious behavior? (Past 3 Months) - - - - - - -   Interrupted Attempts (Lifetime) - - - - - - -   Interrupted Attempts (Past 3 Months) - - - - - - -   Aborted or Self-Interrupted Attempt (Lifetime) - - - - - - -   Aborted or Self-Interrupted Attempt (Past 3 Months) - - - - - - -   Preparatory Acts or Behavior (Lifetime) - - - - - - -   Preparatory Acts or Behavior (Past 3 Months) - -  - - - - -     [place] Suicide Severity Rating Scale (Short Version)  [place] Suicide Severity Rating (Short Version) 1/22/2019 1/24/2019 11/10/2019 12/2/2019 12/10/2019 6/11/2020 7/1/2020   Over the past 2 weeks have you felt down, depressed, or hopeless? - - no yes yes no no   Over the past 2 weeks have you had thoughts of killing yourself? - - no yes no no no   Comments - - - lots of stressors, has thought about not taking meds - - -   Have you ever attempted to kill yourself? - - no no no no no   Q1 Wished to be Dead (Past Month) no no - other (see comments) no - -   Comments - - - why did i do this surgery - - -   Q2 Suicidal Thoughts (Past Month) no no - no no - -   Comments - - - wishes he wouldn't have gone through surgery - - -   Q3 Suicidal Thought Method - - - no no - -   Q4 Suicidal Intent without Specific Plan - - - no no - -   Q5 Suicide Intent with Specific Plan - - - no no - -   Q6 Suicide Behavior (Lifetime) no no - no no - -       Patient denies current homicidal ideation and behaviors.  Patient denies current self-injurious ideation and behaviors.    Patient denied risk behaviors associated with substance use.  Patient reported impulsive/compulsive spending behaviors associated with mental health symptoms.  Patient reports the following current concerns for their personal safety: None.  Patient reports there are not  firearms in the house. none.     History of Safety Concerns:  Patient denied a history of homicidal ideation.     Patient denied a history of personal safety concerns.    Patient denied a history of assaultive behaviors.    Patient denied a history of sexual assault behaviors.     Patient denied a history of risk behaviors associated with substance use.  Patient reported a history of impulsive/compulsive spending behaviors associated with mental health symptoms. See records for details.  Patient reports the following protective factors: forward/future oriented thinking, safe and  stable environment, abstinence from substances and financial stability    Risk Plan:  See Recommendations for Safety and Risk Management Plan    Review of Symptoms per patient report:  Depression: Change in sleep, Lack of interest, Excessive or inappropriate guilt, Change in energy level, Difficulties concentrating and Feeling sad, down, or depressed  Rekha:  No Symptoms  Psychosis: No Symptoms  Anxiety: Excessive worry, Nervousness, Physical complaints, such as headaches, stomachaches, muscle tension, Ruminations, Poor concentration, Irritability and worrying, trouble managing worry, trouble relaxing  Panic:  No symptoms  Post Traumatic Stress Disorder:  No Symptoms   Eating Disorder: No Symptoms  ADD / ADHD:  No symptoms  Conduct Disorder: No symptoms  Autism Spectrum Disorder: No symptoms  Obsessive Compulsive Disorder: No Symptoms    Patient reports the following compulsive behaviors and treatment history: none currently.      Diagnostic Criteria: *Note patient does not meet criteria for a manic episode at this time - criteria is based on previous contact in June, 2020. Most recent symptoms are consistent with a mild major depressive episode.    A. Excessive anxiety and worry about a number of events or activities (such as work or school performance).   B. The person finds it difficult to control the worry.  C. Select 3 or more symptoms (required for diagnosis). Only one item is required in children.   - Restlessness or feeling keyed up or on edge.    - Being easily fatigued.    - Difficulty concentrating or mind going blank.    - Irritability.    - Muscle tension.    - Sleep disturbance (difficulty falling or staying asleep, or restless unsatisfying sleep).   D. The focus of the anxiety and worry is not confined to features of an Axis I disorder.  E. The anxiety, worry, or physical symptoms cause clinically significant distress or impairment in social, occupational, or other important areas of functioning.   F.  The disturbance is not due to the direct physiological effects of a substance (e.g., a drug of abuse, a medication) or a general medical condition (e.g., hyperthyroidism) and does not occur exclusively during a Mood Disorder, a Psychotic Disorder, or a Pervasive Developmental Disorder.  A. A distinct period of abnormally and persistently elevated, expansive, or irritable mood, lasting at least 1 week (or any duration if hospitalization is necessary).   B. During the period of mood disturbance, three (or more) of the following symptoms (four if the mood is only irritable) have persisted and have been present to a significant degree:   - inflated self-esteem or grandiosity    - decreased need for sleep (e.g., feels rested after only 3 hours of sleep)    - more talkative than usual or pressure to keep talking    - flight of ideas or subjectivie experience that thoughts are racing   - distractibility   - increase in goal-directed activity   - excessive involvement in pleasurable activities that have a high potential for painful consequences, such as spending money or sexual indiscretion.  C. The mood disturbance is sufficiently severe to cause marked impairment in social or occupational functioning or to necessitate hospitalization to prevent harm to self or others, or there are severe psychotic features  D. The symptoms are not attributable to the physiologicial effects of a substance or to another medical condition  E. The episode is sufficiently severe enough to cause marked impairment in social or occupational functioning or to necessitate hospitalization to prevent harm to self or others, or there are psychotic features  F. The symptoms are not due to the direct physiological effects of a substance (eg, a drug of abuse, a medication, or other treatment) or a general medical condition (eg, hyperthyroidism).   - Depressed mood. Note: In children and adolescents, can be irritable mood.     - Diminished interest or  pleasure in all, or almost all, activities.    - Decreased sleep.    - Fatigue or loss of energy.    - Feelings of worthlessness or excessive guilt.    - Diminished ability to think or concentrate, or indecisiveness.     Functional Status:  Patient reports the following functional impairments: money management, organization, relationship(s) and self-care.     WHODAS: No flowsheet data found.  Nonprogrammatic care:  Patient is requesting basic services to address current mental health concerns.    Clinical Summary:  1. Reason for assessment: hx of bipolar disorder, desire to resume care  .  2. Psychosocial, Cultural and Contextual Factors: lives alone.  3. Principal DSM5 Diagnoses  (Sustained by DSM5 Criteria Listed Above):   296.51 Bipolar I Disorder Current or Most Recent Episode Depressed, Mild.  4. Other Diagnoses that is relevant to services:   300.02 (F41.1) Generalized Anxiety Disorder.  5. Provisional Diagnosis: none  6. Prognosis: Expect Improvement and Maintain Current Status / Prevent Deterioration.  7. Likely consequences of symptoms if not treated: possible deterioration of mood symptoms.  8. Client strengths include:  educated, intelligent, motivated, open to learning, open to suggestions / feedback, wants to learn, willing to ask questions and work history .     Recommendations:     1. Plan for Safety and Risk Management:   Recommended that patient call 911 or go to the local ED should there be a change in any of these risk factors.Report to child / adult protection services was NA.     2. Patient's identified no cultural or diversity factors relevant to therapy.     3. Initial Treatment will focus on:    Depressed Mood - help allieviate acute symptoms of depression through talk therapy.     4. Resources/Service Plan:    services are not indicated.   Modifications to assist communication are not indicated.   Additional disability accommodations are not indicated.      5.  Collaboration:   Collaboration / coordination of treatment will be initiated with the following  support professionals: [facility] will coordinate with care team as needed.      6.  Referrals:   The following referral(s) will be initiated: none right now - possible referral to [facility] if symptoms do not respond to current services. Next Scheduled Appointment: 12/29/2020.     A Release of Information has been obtained for the following: none.    7. MARISA:    MARISA:  Discussed the general effects of drugs and alcohol on health and well-being. Provider gave patient printed information about the effects of chemical use on their health and well being. Recommendations: Avoid heavy alcohol use and no mood-altering substances/illicit drugs.     8. Records:   These were reviewed at time of assessment.   Information in this assessment was obtained from the medical record and  provided by patient who is a good historian.  Patient will have open access to their mental health medical record.        Provider Name/ Credentials:  Joseph Murillo LP December 4, 2020        ______________________________________________________________________    Integrated Primary Care Behavioral Health Treatment Plan    Patient's Name: Frandy Workman  YOB: 1965    Date: December 4, 2020    DSM-V Diagnoses: 296.51 Bipolar I Disorder Current or Most Recent Episode Depressed, Mild  Psychosocial / Contextual Factors: lives alone.   WHODAS: none on file  Clinical Global Impressions  First:  Considering your total clinical experience with this particular patient population, how severe are the patient's symptoms at this time?: 4 (12/18/2020  2:22 PM)      Most recent:  Compared to the patient's condition at the START of treatment, this patient's condition is: 2 (12/18/2020  2:22 PM)      Referral / Collaboration:  The following referral(s) will be initiated: [facility] .    Anticipated number of session or this episode of care: 12+    MeasurableTreatment  Goal(s) related to diagnosis / functional impairment(s)  Goal 1: Patient will improve adaptive responses to bipolar disorder    Objective #A (Patient Action)    Patient will identify 2-3 signs or signals of emerging mood instability  Status: Continued - Date(s):12/4/2020     Objective #B  Patient will identify 2-3 strategies for more effectively managing Bipolar Disorder  Status: Continued - Date(s): 12/4/2020      Goal 2: Patient will experience a reduction in depressive and anxiety symptoms     Objective #A (Patient Action)        Patient will Increase interest, engagement, and pleasure in doing things  Decrease frequency and intensity of feeling down, depressed, hopeless  Identify negative self-talk and behaviors: challenge core beliefs, myths, and actions  Status: Continued - Date(s):12/4/2020     Objective #B  Patient will use cognitive strategies identified in therapy to challenge anxious thoughts  Status: Continued - Date(s): 12/4/2020     Goal 3:  Patient will improve subjective quality of life and engagement in important daily activities     Objective #A: Patient will effectively address problems that interfere with adaptive functioning   Status: Continued - Date(s): 12/4/2020    Objective #B: Patient will address relationship difficulties in a more adaptive manner  Status: Continued - Date(s): 12/4/2020    Possible Therapeutic Intervention(s)  Psycho-education regarding mental health diagnoses and treatment options    Supportive Counseling     Skills training    Explore skills useful to client in current situation.    Skills include assertiveness, communication, conflict management, problem-solving, relaxation, etc.    Solution-Focused Therapy    Explore patterns in patient's relationships and discuss options for new behaviors.    Explore patterns in patient's actions and choices and discuss options for new behaviors.    Cognitive-behavioral Therapy    Discuss common cognitive distortions, identify them in  patient's life.    Explore ways to challenge, replace, and act against these cognitions.    Acceptance and Commitment Therapy    Explore and identify important values in patient's life.    Discuss ways to commit to behavioral activation around these values.    Psychodynamic psychotherapy    Discuss patient's emotional dynamics and issues and how they impact behaviors.    Explore patient's history of relationships and how they impact present behaviors.    Explore how to work with and make changes in these schemas and patterns.    Narrative Therapy    Explore the patient's story of his/her life from his/her perspective.    Explore alternate ways of understanding their experience, identifying exceptions, developing new themes.    Interpersonal Psychotherapy    Explore patterns in relationships that are effective or ineffective at helping patient reach their goals, find satisfying experience.    Discuss new patterns or behaviors to engage in for improved social functioning.    Behavioral Activation    Discuss steps patient can take to become more involved in meaningful activity.    Identify barriers to these activities and explore possible solutions.    Mindfulness-Based Strategies    Discuss skills based on development and application of mindfulness.    Skills drawn from compassion-focused therapy, dialectical behavior therapy, mindfulness-based stress reduction, mindfulness-based cognitive therapy, etc.      We have developed these goals together during our work to this point. Patient has assisted in the development of these goals and has agreed to this treatment plan.       Joseph Murillo, LP

## 2020-12-08 ENCOUNTER — TELEPHONE (OUTPATIENT)
Dept: CARDIOLOGY | Facility: CLINIC | Age: 55
End: 2020-12-08

## 2020-12-08 ENCOUNTER — TELEPHONE (OUTPATIENT)
Dept: ENDOCRINOLOGY | Facility: CLINIC | Age: 55
End: 2020-12-08

## 2020-12-08 NOTE — TELEPHONE ENCOUNTER
[facility] Call Center    Phone Message    May a detailed message be left on voicemail: yes     Reason for Call: Other: Follow-up on fax that was faxed on 12/04/2020 - work capacity narrative. Please have Tish sign it and fax it back. Fax: [phone]. Please call Lea if there's any questions.      Action Taken: Message routed to:  [facility] ([facility] Endo    Travel Screening: Not Applicable

## 2020-12-08 NOTE — TELEPHONE ENCOUNTER
[facility] Call Center    Phone Message    May a detailed message be left on voicemail: yes     Reason for Call: Other: Briana from [employer] calling in regards to paperwork that they had faxed over, it was a work capacity form that needs to be filled out then faxed back to [phone].     Action Taken: Message routed to:  [facility] cardio    Travel Screening: Not Applicable

## 2020-12-09 NOTE — TELEPHONE ENCOUNTER
Formed sent to Pawel Lebron CMA to print out for Dr Ireland next time he is in clinic.     Spoke to [employer] representative to confirmed that we received formed and will be filled out and faxed on Dec 22, 2020.     He states understanding and agrees to call with any questions or concerns.

## 2020-12-15 ENCOUNTER — TELEPHONE (OUTPATIENT)
Dept: ENDOCRINOLOGY | Facility: CLINIC | Age: 55
End: 2020-12-15

## 2020-12-15 NOTE — TELEPHONE ENCOUNTER
[facility] life insurance  form returned by Tish Toscano.Questions asked  need Occupationial Medicine Evaluation to be  ordered by PCP. None of the questions were in Tish Toscano scope of practice.   Brisa Woods RN on 12/15/2020 at 1:58 PM

## 2020-12-23 ENCOUNTER — DOCUMENTATION ONLY (OUTPATIENT)
Dept: GASTROENTEROLOGY | Facility: CLINIC | Age: 55
End: 2020-12-23

## 2020-12-23 NOTE — PROGRESS NOTES
Faxed [employer] form to [phone]. Mailed original to patient home address and sent copy to scan.    Anne-Marie Boykin, [facility] CMA  12/23/2020 9:10 AM

## 2020-12-27 ENCOUNTER — HEALTH MAINTENANCE LETTER (OUTPATIENT)
Age: 55
End: 2020-12-27

## 2020-12-29 ENCOUNTER — VIRTUAL VISIT (OUTPATIENT)
Dept: BEHAVIORAL HEALTH | Facility: CLINIC | Age: 55
End: 2020-12-29
Payer: MEDICARE

## 2020-12-29 DIAGNOSIS — F31.31 BIPOLAR 1 DISORDER, DEPRESSED, MILD (H): Primary | ICD-10-CM

## 2020-12-29 PROCEDURE — 90834 PSYTX W PT 45 MINUTES: CPT | Mod: 95 | Performed by: PSYCHOLOGIST

## 2020-12-29 ASSESSMENT — ANXIETY QUESTIONNAIRES
GAD7 TOTAL SCORE: 8
4. TROUBLE RELAXING: MORE THAN HALF THE DAYS
3. WORRYING TOO MUCH ABOUT DIFFERENT THINGS: MORE THAN HALF THE DAYS
GAD7 TOTAL SCORE: 8
7. FEELING AFRAID AS IF SOMETHING AWFUL MIGHT HAPPEN: MORE THAN HALF THE DAYS
1. FEELING NERVOUS, ANXIOUS, OR ON EDGE: SEVERAL DAYS
GAD7 TOTAL SCORE: 8
7. FEELING AFRAID AS IF SOMETHING AWFUL MIGHT HAPPEN: MORE THAN HALF THE DAYS
6. BECOMING EASILY ANNOYED OR IRRITABLE: SEVERAL DAYS
2. NOT BEING ABLE TO STOP OR CONTROL WORRYING: NOT AT ALL
5. BEING SO RESTLESS THAT IT IS HARD TO SIT STILL: NOT AT ALL

## 2020-12-29 ASSESSMENT — PATIENT HEALTH QUESTIONNAIRE - PHQ9
SUM OF ALL RESPONSES TO PHQ QUESTIONS 1-9: 5
10. IF YOU CHECKED OFF ANY PROBLEMS, HOW DIFFICULT HAVE THESE PROBLEMS MADE IT FOR YOU TO DO YOUR WORK, TAKE CARE OF THINGS AT HOME, OR GET ALONG WITH OTHER PEOPLE: SOMEWHAT DIFFICULT
SUM OF ALL RESPONSES TO PHQ QUESTIONS 1-9: 5

## 2020-12-29 NOTE — PROGRESS NOTES
[facility]al [facility]  December 29, 2020      Behavioral Health Clinician Progress Note    Patient Name: Frandy Workman           Service Type: Video visit      Service Location:  Video visit      Session Start Time: 3:06 Session End Time: 3:53       Session Length: 38 - 52      Attendees: Patient    Visit Activities (Refresh list every visit): [facility] Only     Telemedicine Visit: The patient's condition can be safely assessed and treated via synchronous audio and visual telemedicine encounter.      Reason for Telemedicine Visit: COVID-19    Originating Site (Patient Location): Patient's home    Distant Site (Provider Location): Provider Remote Setting    Consent:  The patient/guardian has verbally consented to: the potential risks and benefits of telemedicine (video visit) versus in person care; bill my insurance or make self-payment for services provided; and responsibility for payment of non-covered services.     Mode of Communication:  Video Conference via [id number]    As the provider I attest to compliance with applicable laws and regulations related to telemedicine.    Diagnostic Assessment Date: 12/03/2020  Treatment Plan Review Date:  3/29/2021  See Flowsheets for today's PHQ-9 and LIZZETTE-7 results  Previous PHQ-9:   PHQ-9 SCORE 1/9/2020 10/13/2020 12/29/2020   PHQ-9 Total Score MyChart - 8 (Mild depression) 5 (Mild depression)   PHQ-9 Total Score 16 8 5     Previous LIZZETTE-7:   LIZZETTE-7 SCORE 10/13/2020 12/29/2020   Total Score 6 (mild anxiety) 8 (mild anxiety)   Total Score 6 8       HEATH LEVEL:  No flowsheet data found.    DATA  Extended Session (60+ minutes): No  Interactive Complexity: No  Crisis: No    Treatment Objective(s) Addressed in This Session:  Target Behavior(s): disease management/lifestyle changes related to depressive and anxiety symptoms    Depression and anxious distress management.     Current Stressors / Issues:  Miller completed a follow-up  visit with this writer today. Miller indicated he is doing well overall, though cites he does struggle with thoughts about loss with his wife and daughter. Miller indicated he does spend a good deal of time alone, which adds to a sense of loneliness/disconnection for him. He reflected on his tendency to ruminate on matters with his daughter in particular, why he cannot contact or see her. We talked briefly about the role of acceptance in his life/the situation, what this might look like for him. We also talked about ways to build more connections in his life and what barriers he experiences to finding social contacts. Miller shared he can feel concerned about his appearance and worries what others think of him, as he said he can look older than his stated age and is self-conscious about his missing teeth. We talked about the role of mind-reading as a barrier for him and I made use of a few cognitive interventions to help him with this idea. We agreed to continue talking about building social support for him and ways to reduce his social anxiety that can arise from time to time.     Progress on Treatment Objective(s) / Homework:  Satisfactory progress - ACTION (Actively working towards change); Intervened by reinforcing change plan / affirming steps taken    Answers for HPI/ROS submitted by the patient on 10/13/2020   If you checked off any problems, how difficult have these problems made it for you to do your work, take care of things at home, or get along with other people?: Somewhat difficult  PHQ9 TOTAL SCORE: 8*  LIZZETTE 7 TOTAL SCORE: 6    *No SI     Answers for HPI/ROS submitted by the patient on 12/29/2020   If you checked off any problems, how difficult have these problems made it for you to do your work, take care of things at home, or get along with other people?: Somewhat difficult  PHQ9 TOTAL SCORE: 5*  LIZZETTE 7 TOTAL SCORE: 8    *No SI     Supportive counseling used    Insight focused counseling used     Care Plan  review completed: yes    Medication Review:  Changes to psychiatric medications, see updated Medication List in EPIC. Started Abilify - makes him drowsy, but helpful    Medication Compliance:  Yes    Changes in Health Issues:   None reported    Chemical Use Review:   Substance Use: Chemical use reviewed, no active concerns identified      Tobacco Use: No current tobacco use.      Assessment: Current Emotional / Mental Status (status of significant symptoms):  Risk status (Self / Other harm or suicidal ideation)  Patient denies a history of suicidal ideation, suicide attempts, self-injurious behavior, homicidal ideation, homicidal behavior and and other safety concerns     Patient denies current fears or concerns for personal safety.  Patient denies current or recent suicidal ideation or behaviors.  Patient denies current or recent homicidal ideation or behaviors.  Patient denies current or recent self injurious behavior or ideation.  Patient denies other safety concerns.     A safety and risk management plan has not been developed at this time, however patient was encouraged to call [place] [phone] should there be a change in any of these risk factors.    [place] Suicide Severity Rating Scale (Short Version)  [place] Suicide Severity Rating (Short Version) 1/22/2019 1/24/2019 11/10/2019 12/2/2019 12/10/2019 6/11/2020 7/1/2020   Over the past 2 weeks have you felt down, depressed, or hopeless? - - no yes yes no no   Over the past 2 weeks have you had thoughts of killing yourself? - - no yes no no no   Comments - - - lots of stressors, has thought about not taking meds - - -   Have you ever attempted to kill yourself? - - no no no no no   Q1 Wished to be Dead (Past Month) no no - other (see comments) no - -   Comments - - - why did i do this surgery - - -   Q2 Suicidal Thoughts (Past Month) no no - no no - -   Comments - - - wishes he wouldn't have gone through surgery - - -   Q3 Suicidal Thought Method - - - no  no - -   Q4 Suicidal Intent without Specific Plan - - - no no - -   Q5 Suicide Intent with Specific Plan - - - no no - -   Q6 Suicide Behavior (Lifetime) no no - no no - -         Appearance:   Appropriate   Eye Contact:   Good   Psychomotor Behavior: Restless   Attitude:   Cooperative  Interested  Orientation:   All  Speech   Rate / Production: Normal/ Responsive   Volume:  Normal   Mood:    Anxious  Depressed   Affect:    Appropriate    Thought Content:  Clear   Thought Form:  Goal Directed  Logical   Insight:    Fair     Diagnoses:  1. Bipolar 1 disorder, depressed, mild (H)          Collateral Reports Completed:  Not Applicable    Plan: (Homework, other):  Continue with this writer for individual psychotherapy in two weeks. Will update diagnostic assessment. Continue medication regimen. Avoid mood-altering substances.     Joseph Murillo, RED       ______________________________________________________________________    [facility] Treatment Plan    Client's Name: Frandy Workman  YOB: 1965    Date: 12/29/2020    DSM-V Diagnoses: 296.51 Bipolar I Disorder Current or Most Recent Episode Depressed, Mild;     Clinical Global Impressions  First:  Considering your total clinical experience with this particular patient population, how severe are the patient's symptoms at this time?: 4 (12/18/2020  2:22 PM)      Most recent:  Compared to the patient's condition at the START of treatment, this patient's condition is: 2 (12/18/2020  2:22 PM)          Referral / Collaboration:  Will collaborate with care team as indicated during treatment.    Anticipated number of session or this episode of care: 10+      MeasurableTreatment Goal(s) related to diagnosis / functional impairment(s)  Goal 1:  Patient will experience a reduction in depressive and anxious symptoms, along with a corresponding increase in positive emotion and life satisfaction.    Objective #A: Patient will experience a  reduction in depressed mood, will develop more effective coping skills to manage depressive symptoms, will develop healthy cognitive patterns and beliefs, will increase ability to function adaptively and will continue to take medications as prescribed / participate in supportive activities and services    Status: Continued - Date(s): 12/29/2020    Objective #B: Patient will experience a reduction in anxiety, will develop more effective coping skills to manage anxiety symptoms, will develop healthy cognitive patterns and beliefs and will increase ability to function adaptively  Status: Continued - Date(s):12/29/2020    Objective #C: Patient will develop better understanding of triggers and coping strategies to stabilize mood  Status: Continued - Date(s): 12/29/2020    Goal 2:  Patient will identify and increase engagement in valued activity, i.e. improving social connections/relationships, pursuing occupational goals or personally meaningful pursuits, exploration of meaning in life.     Objective #A: Patient will identify meaningful activity in social, occupational and  personal goals, and increase behavioral activation around these goals   Status: Continued - Date(s): 12/29/2020    Objective #B: Patient will address relationship difficulties in a more adaptive manner  Status: Continued - Date(s): 12/29/2020    Objective #C: Patient will develop coping/problem-solving skills to facilitate more adaptive adjustment and will effectively address problems that interfere with adaptive functioning  Status: Continued - Date(s): 12/29/2020        Possible Therapeutic Intervention(s)  Psycho-education regarding mental health diagnoses and treatment options    Skills training    Explore skills useful to client in current situation.    Skills include assertiveness, communication, conflict management, problem-solving, relaxation, etc.    Solution-Focused Therapy    Explore patterns in patient's relationships and discuss options  for new behaviors.    Explore patterns in patient's actions and choices and discuss options for new behaviors.    Cognitive-behavioral Therapy    Discuss common cognitive distortions, identify them in patient's life.    Explore ways to challenge, replace, and act against these cognitions.    Acceptance and Commitment Therapy    Explore and identify important values in patient's life.    Discuss ways to commit to behavioral activation around these values.    Psychodynamic psychotherapy    Discuss patient's emotional dynamics and issues and how they impact behaviors.    Explore patient's history of relationships and how they impact present behaviors.    Explore how to work with and make changes in these schemas and patterns.    Narrative Therapy    Explore the patient's story of his/her life from his/her perspective.    Explore alternate ways of understanding their experience, identifying exceptions, developing new themes.    Interpersonal Psychotherapy    Explore patterns in relationships that are effective or ineffective at helping patient reach their goals, find satisfying experience.    Discuss new patterns or behaviors to engage in for improved social functioning.    Behavioral Activation    Discuss steps patient can take to become more involved in meaningful activity.    Identify barriers to these activities and explore possible solutions.    Mindfulness-Based Strategies    Discuss skills based on development and application of mindfulness.    Skills drawn from compassion-focused therapy, dialectical behavior therapy, mindfulness-based stress reduction, mindfulness-based cognitive therapy, etc.      We have developed these goals together during our work to this point. Patient has assisted in the development of these goals and has agreed to this treatment plan.       Joseph Murillo, RED  December 29, 2020

## 2020-12-30 DIAGNOSIS — Z94.4 LIVER REPLACED BY TRANSPLANT (H): ICD-10-CM

## 2020-12-30 LAB
ALBUMIN SERPL-MCNC: 4.1 G/DL (ref 3.4–5)
ALP SERPL-CCNC: 145 U/L (ref 40–150)
ALT SERPL W P-5'-P-CCNC: 34 U/L (ref 0–70)
ANION GAP SERPL CALCULATED.3IONS-SCNC: 5 MMOL/L (ref 3–14)
AST SERPL W P-5'-P-CCNC: 18 U/L (ref 0–45)
BILIRUB DIRECT SERPL-MCNC: 0.1 MG/DL (ref 0–0.2)
BILIRUB SERPL-MCNC: 0.6 MG/DL (ref 0.2–1.3)
BUN SERPL-MCNC: 40 MG/DL (ref 7–30)
CALCIUM SERPL-MCNC: 9.3 MG/DL (ref 8.5–10.1)
CHLORIDE SERPL-SCNC: 107 MMOL/L (ref 94–109)
CO2 SERPL-SCNC: 27 MMOL/L (ref 20–32)
CREAT SERPL-MCNC: 1.79 MG/DL (ref 0.66–1.25)
ERYTHROCYTE [DISTWIDTH] IN BLOOD BY AUTOMATED COUNT: 14.1 % (ref 10–15)
GFR SERPL CREATININE-BSD FRML MDRD: 41 ML/MIN/{1.73_M2}
GLUCOSE SERPL-MCNC: 236 MG/DL (ref 70–99)
HBA1C MFR BLD: 6 % (ref 0–5.6)
HCT VFR BLD AUTO: 37.7 % (ref 40–53)
HGB BLD-MCNC: 12.5 G/DL (ref 13.3–17.7)
MAGNESIUM SERPL-MCNC: 2.2 MG/DL (ref 1.6–2.3)
MCH RBC QN AUTO: 33.1 PG (ref 26.5–33)
MCHC RBC AUTO-ENTMCNC: 33.2 G/DL (ref 31.5–36.5)
MCV RBC AUTO: 100 FL (ref 78–100)
PLATELET # BLD AUTO: 124 10E9/L (ref 150–450)
POTASSIUM SERPL-SCNC: 4.2 MMOL/L (ref 3.4–5.3)
PROT SERPL-MCNC: 8.2 G/DL (ref 6.8–8.8)
RBC # BLD AUTO: 3.78 10E12/L (ref 4.4–5.9)
SODIUM SERPL-SCNC: 138 MMOL/L (ref 133–144)
TACROLIMUS BLD-MCNC: 4.5 UG/L (ref 5–15)
TME LAST DOSE: ABNORMAL H
WBC # BLD AUTO: 4.5 10E9/L (ref 4–11)

## 2020-12-30 PROCEDURE — 80048 BASIC METABOLIC PNL TOTAL CA: CPT | Performed by: PATHOLOGY

## 2020-12-30 PROCEDURE — 83735 ASSAY OF MAGNESIUM: CPT | Performed by: PATHOLOGY

## 2020-12-30 PROCEDURE — 36415 COLL VENOUS BLD VENIPUNCTURE: CPT | Performed by: PATHOLOGY

## 2020-12-30 PROCEDURE — 85027 COMPLETE CBC AUTOMATED: CPT | Performed by: PATHOLOGY

## 2020-12-30 PROCEDURE — 80197 ASSAY OF TACROLIMUS: CPT | Performed by: PATHOLOGY

## 2020-12-30 PROCEDURE — 83036 HEMOGLOBIN GLYCOSYLATED A1C: CPT | Performed by: PATHOLOGY

## 2020-12-30 PROCEDURE — 80076 HEPATIC FUNCTION PANEL: CPT | Performed by: PATHOLOGY

## 2020-12-30 ASSESSMENT — ANXIETY QUESTIONNAIRES: GAD7 TOTAL SCORE: 8

## 2020-12-30 ASSESSMENT — PATIENT HEALTH QUESTIONNAIRE - PHQ9: SUM OF ALL RESPONSES TO PHQ QUESTIONS 1-9: 5

## 2021-01-01 NOTE — PLAN OF CARE
Discharge Planner PT   Patient plan for discharge: Pt to return to [facility] by himself.  Pt did have PCA help a few hours a week prior to admission.  Current status: Pt continues to progress functional endurance.  Pt has noted balance decrease when fatigued amb >1000'.  Barriers to return to prior living situation: Weakness with R>L, balance defictis when fatigue, Deconditioning  Recommendations for discharge: Pt to use right ear when fatigued, Will continue with PT to ensure safety 2/2 to living independently  Rationale for recommendations: pt presentation and home set-up       Entered by: Nathan Wharton 12/14/2019 12:19 PM        You can access the FollowMyHealth Patient Portal offered by [facility] by registering at the following website: [email or web address]. By joining [employer]’s FollowMyHealth portal, you will also be able to view your health information using other applications (apps) compatible with our system.

## 2021-01-02 NOTE — PROGRESS NOTES
Pre charting:  Date of pre chartin21  12:05 pm-12:15 pm  10 min  Effort essential to preparing for upcoming service directly affecting ongoing primary care management and coordination of care for this patient for future OV date 21:   Person performing pre charting work:  Dr. Moe  Non face-to-face clinical work included review/documentation of medical history, medications, vital sign trends,  routine health care maintenance, specialist notes, laboratory, procedures, imaging) as detailed below.      Virtual visit    CC:  Routine follow up    Other health care team members:  PharmALEX Blum, Arie Weslh, Donald Bradley  Cardiology Dr. LORIE Ireland  GI/hepatology Dr. DINO Banuelos  Transplant Dr. SHAD Ramos,    Transplant coordinator ?Radha Ndiaye RN  Podiatry Dr. LORIE Gonzalez  Endocrinology LILLIAN Toscano,  Dr. Wilcox  Psychiatry Dr. RAMAN Davison (in past)  Psychology:  Joseph Murillo, RED  Nephrology Dr. BRYANT Rao  Oncology NP AZIZA Wilson, Dr. GARFIELD Villarreal  Dermatology Dr. ALEX Smith  Ophthalmology Dr. PEREIRA Hilliards  Transplant  Dilan Pratt  Radiology Dr. KELLI Scott      HPI:  55 year old male  PMHx notable for Type 2 DM with retinopathy, HCC, CAD, liver transplantation, immunosuppressed status, HTN, malnutrition, CKD4, MDD, bipolar DO, anemia chronic renal failure    At our last OV on 20 we addressed:  Established care with me  Reviewed ongoing nephrology, hepatology and cardiology management plans  Leg swelling (improving)  Plans to see psychology  Not interested in see psychiatry  Desire to have Medicare Wellness visit (he no-showed subsequent visit for this)    Since our last OV:  Established care with psychologist, Joseph Murillo, [facility]  Bipolar DO, depression, anxiety  Saw ophthalmology, GI, oncology  and nephrology--all notes were reviewed with Miller  We also reviewed labs, and routine health care maintenance.  Immunizations are up to date except for will need second Shingrix  shot  Colonoscopy 12/2/19  BG's at home have been running higher (180's) over the holidays, more snacking at night  BP's--has not been checking.  Creatine is increasing.  We discussed the importance of controlling diabetes and hypertension.        Component      Latest Ref Rng & Units 12/30/2020   Sodium      133 - 144 mmol/L 138   Potassium      3.4 - 5.3 mmol/L 4.2   Chloride      94 - 109 mmol/L 107   Carbon Dioxide      20 - 32 mmol/L 27   Anion Gap      3 - 14 mmol/L 5   Glucose      70 - 99 mg/dL 236 (H)   Urea Nitrogen      7 - 30 mg/dL 40 (H)   Creatinine      0.66 - 1.25 mg/dL 1.79 (H)   GFR Estimate      >60 mL/min/1.73:m2 41 (L)   GFR Estimate If Black      >60 mL/min/1.73:m2 48 (L)   Calcium      8.5 - 10.1 mg/dL 9.3   WBC      4.0 - 11.0 10e9/L 4.5   RBC Count      4.4 - 5.9 10e12/L 3.78 (L)   Hemoglobin      13.3 - 17.7 g/dL 12.5 (L)   Hematocrit      40.0 - 53.0 % 37.7 (L)   MCV      78 - 100 fl 100   MCH      26.5 - 33.0 pg 33.1 (H)   MCHC      31.5 - 36.5 g/dL 33.2   RDW      10.0 - 15.0 % 14.1   Platelet Count      150 - 450 10e9/L 124 (L)   Hemoglobin A1C      0 - 5.6 % 6.0 (H)     Component      Latest Ref Rng & Units 1/24/2020   TSH      0.40 - 4.00 mU/L 1.91     Component      Latest Ref Rng & Units 9/16/2020 9/25/2020 10/14/2020 11/11/2020   Creatinine      0.66 - 1.25 mg/dL 1.60 (H) 1.48 (H) 1.95 (H) 1.74 (H)   GFR Estimate      >60 mL/min/1.73:m2 47 (L) 52 (L) 37 (L) 43 (L)     Component      Latest Ref Rng & Units 12/2/2020 12/30/2020   Creatinine      0.66 - 1.25 mg/dL 1.55 (H) 1.79 (H)   GFR Estimate      >60 mL/min/1.73:m2 49 (L) 41 (L)     Component      Latest Ref Rng & Units 9/25/2020   Cholesterol      <200 mg/dL 146   Triglycerides      <150 mg/dL 228 (H)   HDL Cholesterol      >39 mg/dL 39 (L)   LDL Cholesterol Calculated      <100 mg/dL 61   Non HDL Cholesterol      <130 mg/dL 107     Component      Latest Ref Rng & Units 11/10/2019   Hepatitis C Antibody      NR:Nonreactive  Nonreactive     Component      Latest Ref Rng & Units 11/10/2019   HIV Antigen Antibody Combo      NR:Nonreactive     Nonreactive     Component      Latest Ref Rng & Units 2/4/2019   PSA      0 - 4 ug/L 0.40     Exam:    173/89 HR 98 immediately walking up the stairs.  Repeat 153/84 HR 84  Last reading prior to this on his machine was: 120/78, but he can't quite tell the date/time.     Gen:  Sounds well on the phone.  Pleasant, engaged in conversation. No acute distress, breathing comfortably.    Frandy was seen today for recheck medication.    Diagnoses and all orders for this visit:    Type 2 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, unspecified whether long term insulin use (H)    Status post liver transplantation (H)    Mild recurrent major depression (H)    Immunosuppressed status (H)    Hypertension, unspecified type    HCC (hepatocellular carcinoma) (H)    Anxiety    Stage 3 chronic kidney disease, unspecified whether stage 3a or 3b CKD    I advised attention to his blood pressure and diabetes control.  He'll work on his diet, keep BG log and keep upcoming appointment in endocrinology.  I advised daily BP checks and instructed them on how to take readings properly.  Make a virtual visit appointment with me in about 2 weeks to review BP log, and also he'd like to to a Medicare Wellness visit at that time.    Nerissa Moe M.D.  Internal Medicine  [facility]     Patients: if you have questions or concerns about this progress note, please discuss them with the provider at a future office visit.

## 2021-01-04 ENCOUNTER — VIRTUAL VISIT (OUTPATIENT)
Dept: INTERNAL MEDICINE | Facility: CLINIC | Age: 56
End: 2021-01-04
Payer: MEDICARE

## 2021-01-04 DIAGNOSIS — C22.0 HCC (HEPATOCELLULAR CARCINOMA) (H): ICD-10-CM

## 2021-01-04 DIAGNOSIS — Z94.4 STATUS POST LIVER TRANSPLANTATION (H): ICD-10-CM

## 2021-01-04 DIAGNOSIS — E11.3293 TYPE 2 DIABETES MELLITUS WITH MILD NONPROLIFERATIVE RETINOPATHY OF BOTH EYES WITHOUT MACULAR EDEMA, UNSPECIFIED WHETHER LONG TERM INSULIN USE (H): Primary | ICD-10-CM

## 2021-01-04 DIAGNOSIS — N18.30 STAGE 3 CHRONIC KIDNEY DISEASE, UNSPECIFIED WHETHER STAGE 3A OR 3B CKD (H): ICD-10-CM

## 2021-01-04 DIAGNOSIS — I10 HYPERTENSION, UNSPECIFIED TYPE: ICD-10-CM

## 2021-01-04 DIAGNOSIS — F41.9 ANXIETY: ICD-10-CM

## 2021-01-04 DIAGNOSIS — D84.9 IMMUNOSUPPRESSED STATUS (H): ICD-10-CM

## 2021-01-04 DIAGNOSIS — F33.0 MILD RECURRENT MAJOR DEPRESSION (H): ICD-10-CM

## 2021-01-04 PROCEDURE — 99443 PR PHYSICIAN TELEPHONE EVALUATION 21-30 MIN: CPT | Mod: 95 | Performed by: INTERNAL MEDICINE

## 2021-01-04 NOTE — PATIENT INSTRUCTIONS
Patient Education     Taking Your Blood Pressure  Blood pressure is the force of blood against the artery wall as it moves from the heart through the blood vessels. You can take your own blood pressure reading using a digital monitor. Take your readings the same each time, using the same arm. Take readings as often as your healthcare provider advises.  About blood pressure monitors  Blood pressure monitors are designed for certain ages and cases. You can find monitors for older adults, for pregnant women, and for children. Make sure the one you choose is the right one for your age and situation.  The American Heart Association advises an automatic cuff monitor that fits on your upper arm (bicep). The cuff should fit your arm size. A cuff that s too large or too small will not give an accurate reading. Measure around your upper arm to find your size.  Monitors that attach to your finger or wrist are not as accurate as monitors for your upper arm.  Ask your healthcare provider for help in choosing a monitor. Bring your monitor to your next provider visit if you need help in using it the correct way.  The steps below are general instructions for using an automatic digital monitor.  Step 1. Relax      Take your blood pressure at the same time every day, such as in the morning or evening. Or take it at the time your healthcare provider advises.    Wait at least30 minutes after smoking, eating, or exercising. Don't drink coffee, tea, soda, or other caffeinated drinks before checking your blood pressure. If needed, use the restroom beforehand.    Sit comfortably at a table with both feet on the floor. Don't cross your legs or feet. Place the monitor near you.    Rest for a few minutes before you begin. Make sure there are no distractions. This includes TV, cell phones, and other electronics. Wait to have conversations with others until after you measure you blood pressure.  Step 2. Wrap the cuff      Place your arm on the  table, palm up. Your arm should be at the level of your heart. Wrap the cuff around your upper arm, just above your elbow. It s best done on bare skin, not over clothing. Most cuffs will show you where the blood vessel in the middle of the arm at the inner side of the elbow (the brachial artery) should line up with the cuff. Look in your monitor's instruction booklet for an illustration. You can also bring your cuff to your healthcare provider and have them show you how to correctly place the cuff.  Step 3. Inflate the cuff      Push the button that starts the pump.    The cuff will tighten, then loosen.    The numbers will change. When they stop changing, your blood pressure reading will appear.    Take 2 or 3 readings 1 minute apart.  Step 4. Write down the results of each reading      Write down your blood pressure numbers for each reading. Note the date and time. Keep your results in one place, such as a notebook. Even if your monitor has a built-in memory, keep a hard copy of the readings.    Remove the cuff from your arm. Turn off the machine.    Bring your blood pressure records with you to each healthcare provider visit.    If you start a new blood pressure medicine, note the day you started the new medicine. Also note the day if you change the dose of your medicine. Measure your blood pressure before your take your medicine. This information goes on your blood pressure recording sheet. This will help your healthcare provider check how well the medicine changes are working.    Ask your provider what numbers mean that you should call him or her. Also ask what numbers mean you should get help right away.  [employer] last reviewed this educational content on 7/1/2019 2000-2020 [employer]. [address] [place] [address], [place] [address]. All rights reserved. This information is not intended as a substitute for professional medical care. Always follow your healthcare professional's instructions.

## 2021-01-04 NOTE — NURSING NOTE
Chief Complaint   Patient presents with     Recheck Medication     Pt would like to discuss follow up from last visit and medications      Valerie Dallas, EMT at 12:18 PM sign on 1/4/2021

## 2021-01-04 NOTE — PROGRESS NOTES
"This patient is being evaluated via a billable telephone visit; THIS VISIT WAS INITIATED BY THE PT, as AN ALTERNATIVE TO IN PERSON VISIT .       The patient has has been notified of following:      \"This billable telephone visit will be conducted via a call between you and your physician/provider. We have found that certain health care needs can be provided without the need for a physical exam.  This service lets us provide the care you need with a short phone conversation.  If a prescription is necessary we can send it directly to your pharmacy.  If lab work is needed we can place an order for that and you can then stop by our lab to have the test done at a later time. We can also place orders for a limited number of imaging tests if they are deemed urgently necessary.     If during the course of the call the physician/provider feels a telephone visit is not appropriate, you will not be charged for this service.\"     Due to efforts to reduce the spread of COVID-19 in the clinic, state, nation, telephone visits are encouraged currently. Patient understands that diagnose and advice is limited by the inability to exam him/her/them face-to-face.    Patient has given verbal consent for the virtual audio visit? Yes  Did patient initiate this virtual visit? Yes    Person spoken to: patient    This was a synchronous virtual visit  Time call initiated:  1:10  Time call ended:  1:37 pm  Total length of call: 27 min    Nerissa Moe M.D.  Internal Medicine  [facility]       Patients: if you have questions or concerns about this progress note, please discuss them with the provider at a future office visit.      "

## 2021-01-14 ENCOUNTER — TELEPHONE (OUTPATIENT)
Dept: INTERNAL MEDICINE | Facility: CLINIC | Age: 56
End: 2021-01-14

## 2021-01-14 ENCOUNTER — VIRTUAL VISIT (OUTPATIENT)
Dept: BEHAVIORAL HEALTH | Facility: CLINIC | Age: 56
End: 2021-01-14
Payer: MEDICARE

## 2021-01-14 DIAGNOSIS — F31.31 BIPOLAR 1 DISORDER, DEPRESSED, MILD (H): Primary | ICD-10-CM

## 2021-01-14 PROCEDURE — 90832 PSYTX W PT 30 MINUTES: CPT | Mod: 95 | Performed by: PSYCHOLOGIST

## 2021-01-14 NOTE — TELEPHONE ENCOUNTER
----- Message from Sadia Vides sent at 1/14/2021 11:56 AM CST -----  Need to make appointment. Patient of [facility]. Paperworks are regarding disability benefits.

## 2021-01-14 NOTE — PROGRESS NOTES
[facility]: Integrated Behavioral Health  January 14, 2021      Behavioral Health Clinician Progress Note    Patient Name: Frandy Workman           Service Type: Video visit      Service Location:  Video visit      Session Start Time: 3:43 Session End Time: 4:12       Session Length: 16 - 37      Attendees: Patient    Visit Activities (Refresh list every visit): [facility] Only     Telemedicine Visit: The patient's condition can be safely assessed and treated via synchronous audio and visual telemedicine encounter.      Reason for Telemedicine Visit: COVID-19    Originating Site (Patient Location): Patient's home    Distant Site (Provider Location): Provider Remote Setting    Consent:  The patient/guardian has verbally consented to: the potential risks and benefits of telemedicine (video visit) versus in person care; bill my insurance or make self-payment for services provided; and responsibility for payment of non-covered services.     Mode of Communication:  Video Conference via [employer]    As the provider I attest to compliance with applicable laws and regulations related to telemedicine.    Diagnostic Assessment Date: 12/03/2020  Treatment Plan Review Date:  3/29/2021  See Flowsheets for today's PHQ-9 and LIZZETTE-7 results  Previous PHQ-9:   PHQ-9 SCORE 1/9/2020 10/13/2020 12/29/2020   PHQ-9 Total Score MyChart - 8 (Mild depression) 5 (Mild depression)   PHQ-9 Total Score 16 8 5     Previous LIZZETTE-7:   LIZZETTE-7 SCORE 10/13/2020 12/29/2020   Total Score 6 (mild anxiety) 8 (mild anxiety)   Total Score 6 8       HEATH LEVEL:  No flowsheet data found.    DATA  Extended Session (60+ minutes): No  Interactive Complexity: No  Crisis: No    Treatment Objective(s) Addressed in This Session:  Target Behavior(s): disease management/lifestyle changes related to depressive and anxiety symptoms    Depression and anxious distress management. Explored thoughts about bipolar disorder diagnosis    Current  "Stressors / Issues:  Miller completed a follow-up visit with this writer today. Miller said he is getting organized with files at home and is doing well overall. Today we talked about his concerns with the diagnosis of bipolar disorder. Historically, Miller reports, he has struggled with the idea of meeting diagn ostic criteria for this disorder and we explored his assumptions and interpretations of this. Miller shared that in his past, he had contact with co-workers and others who reportedly struggled with bipolar disorder and he described them as being unpleasant or difficult people. He indicated that because he does not consider himself similar to them, that the diagnosis hasn't seemed to fit him. Miller does report that he believes some of his experiences, like from last spring in 2020, were consistent with amber, like making impulsive financial decisions. We continued to broadly explore bipolar disorder and I provided him psycho-education about it as appropriate. We talked about the importance of maintaining his medication regimen, as discontinuing medications suddenly is a risk factor for individuals with bipolar disorder. He shared that the Abilify he takes is helping maintain his mood, making it more \"even.\" After about 25 minutes, Miller shared he was getting tired and wished to stop today. We agreed to meet again in a few weeks.     Progress on Treatment Objective(s) / Homework:  Satisfactory progress - ACTION (Actively working towards change); Intervened by reinforcing change plan / affirming steps taken    Answers for HPI/ROS submitted by the patient on 10/13/2020   If you checked off any problems, how difficult have these problems made it for you to do your work, take care of things at home, or get along with other people?: Somewhat difficult  PHQ9 TOTAL SCORE: 8*  LIZZETTE 7 TOTAL SCORE: 6    *No SI     Answers for HPI/ROS submitted by the patient on 12/29/2020   If you checked off any problems, how difficult have these " problems made it for you to do your work, take care of things at home, or get along with other people?: Somewhat difficult  PHQ9 TOTAL SCORE: 5*  LIZZETTE 7 TOTAL SCORE: 8    *No SI     Psycho-education about bipolar disorder given    Supportive counseling used    Insight focused counseling used     Care Plan review completed: yes    Medication Review:  Changes to psychiatric medications, see updated Medication List in EPIC. Started Abilify - makes him drowsy, but helpful    Medication Compliance:  Yes    Changes in Health Issues:   None reported    Chemical Use Review:   Substance Use: Chemical use reviewed, no active concerns identified      Tobacco Use: No current tobacco use.      Assessment: Current Emotional / Mental Status (status of significant symptoms):  Risk status (Self / Other harm or suicidal ideation)  Patient denies a history of suicidal ideation, suicide attempts, self-injurious behavior, homicidal ideation, homicidal behavior and and other safety concerns     Patient denies current fears or concerns for personal safety.  Patient denies current or recent suicidal ideation or behaviors.  Patient denies current or recent homicidal ideation or behaviors.  Patient denies current or recent self injurious behavior or ideation.  Patient denies other safety concerns.     A safety and risk management plan has not been developed at this time, however patient was encouraged to call [place] [phone] should there be a change in any of these risk factors.    [place] Suicide Severity Rating Scale (Short Version)  [place] Suicide Severity Rating (Short Version) 1/22/2019 1/24/2019 11/10/2019 12/2/2019 12/10/2019 6/11/2020 7/1/2020   Over the past 2 weeks have you felt down, depressed, or hopeless? - - no yes yes no no   Over the past 2 weeks have you had thoughts of killing yourself? - - no yes no no no   Comments - - - lots of stressors, has thought about not taking meds - - -   Have you ever attempted to kill  yourself? - - no no no no no   Q1 Wished to be Dead (Past Month) no no - other (see comments) no - -   Comments - - - why did i do this surgery - - -   Q2 Suicidal Thoughts (Past Month) no no - no no - -   Comments - - - wishes he wouldn't have gone through surgery - - -   Q3 Suicidal Thought Method - - - no no - -   Q4 Suicidal Intent without Specific Plan - - - no no - -   Q5 Suicide Intent with Specific Plan - - - no no - -   Q6 Suicide Behavior (Lifetime) no no - no no - -         Appearance:   Appropriate   Eye Contact:   Good   Psychomotor Behavior: Restless   Attitude:   Cooperative  Interested  Orientation:   All  Speech   Rate / Production: Normal/ Responsive   Volume:  Normal   Mood:    Anxious  Depressed   Affect:    Appropriate    Thought Content:  Clear   Thought Form:  Goal Directed  Logical   Insight:    Good     Diagnoses:  1. Bipolar 1 disorder, depressed, mild (H)          Collateral Reports Completed:  Not Applicable    Plan: (Homework, other):  Continue with this writer for individual psychotherapy in two weeks.  Continue medication regimen. Avoid mood-altering substances.     Joseph Murillo,        ______________________________________________________________________    [facility] Integrated [facility]al Health Treatment Plan    Client's Name: Frandy Workman  YOB: 1965    Date: 12/29/2020    DSM-V Diagnoses: 296.51 Bipolar I Disorder Current or Most Recent Episode Depressed, Mild;     Clinical Global Impressions  First:  Considering your total clinical experience with this particular patient population, how severe are the patient's symptoms at this time?: 4 (12/18/2020  2:22 PM)      Most recent:  Compared to the patient's condition at the START of treatment, this patient's condition is: 2 (12/18/2020  2:22 PM)          Referral / Collaboration:  Will collaborate with care team as indicated during treatment.    Anticipated number of session or this episode of care:  10+      MeasurableTreatment Goal(s) related to diagnosis / functional impairment(s)  Goal 1:  Patient will experience a reduction in depressive and anxious symptoms, along with a corresponding increase in positive emotion and life satisfaction.    Objective #A: Patient will experience a reduction in depressed mood, will develop more effective coping skills to manage depressive symptoms, will develop healthy cognitive patterns and beliefs, will increase ability to function adaptively and will continue to take medications as prescribed / participate in supportive activities and services    Status: Continued - Date(s): 12/29/2020    Objective #B: Patient will experience a reduction in anxiety, will develop more effective coping skills to manage anxiety symptoms, will develop healthy cognitive patterns and beliefs and will increase ability to function adaptively  Status: Continued - Date(s):12/29/2020    Objective #C: Patient will develop better understanding of triggers and coping strategies to stabilize mood  Status: Continued - Date(s): 12/29/2020    Goal 2:  Patient will identify and increase engagement in valued activity, i.e. improving social connections/relationships, pursuing occupational goals or personally meaningful pursuits, exploration of meaning in life.     Objective #A: Patient will identify meaningful activity in social, occupational and  personal goals, and increase behavioral activation around these goals   Status: Continued - Date(s): 12/29/2020    Objective #B: Patient will address relationship difficulties in a more adaptive manner  Status: Continued - Date(s): 12/29/2020    Objective #C: Patient will develop coping/problem-solving skills to facilitate more adaptive adjustment and will effectively address problems that interfere with adaptive functioning  Status: Continued - Date(s): 12/29/2020        Possible Therapeutic Intervention(s)  Psycho-education regarding mental health diagnoses and  treatment options    Skills training    Explore skills useful to client in current situation.    Skills include assertiveness, communication, conflict management, problem-solving, relaxation, etc.    Solution-Focused Therapy    Explore patterns in patient's relationships and discuss options for new behaviors.    Explore patterns in patient's actions and choices and discuss options for new behaviors.    Cognitive-behavioral Therapy    Discuss common cognitive distortions, identify them in patient's life.    Explore ways to challenge, replace, and act against these cognitions.    Acceptance and Commitment Therapy    Explore and identify important values in patient's life.    Discuss ways to commit to behavioral activation around these values.    Psychodynamic psychotherapy    Discuss patient's emotional dynamics and issues and how they impact behaviors.    Explore patient's history of relationships and how they impact present behaviors.    Explore how to work with and make changes in these schemas and patterns.    Narrative Therapy    Explore the patient's story of his/her life from his/her perspective.    Explore alternate ways of understanding their experience, identifying exceptions, developing new themes.    Interpersonal Psychotherapy    Explore patterns in relationships that are effective or ineffective at helping patient reach their goals, find satisfying experience.    Discuss new patterns or behaviors to engage in for improved social functioning.    Behavioral Activation    Discuss steps patient can take to become more involved in meaningful activity.    Identify barriers to these activities and explore possible solutions.    Mindfulness-Based Strategies    Discuss skills based on development and application of mindfulness.    Skills drawn from compassion-focused therapy, dialectical behavior therapy, mindfulness-based stress reduction, mindfulness-based cognitive therapy, etc.      We have developed these  goals together during our work to this point. Patient has assisted in the development of these goals and has agreed to this treatment plan.       Joseph Murillo, LP  December 29, 2020

## 2021-01-14 NOTE — TELEPHONE ENCOUNTER
Appointment already have virtual appointment on Monday 1/18 with Dr Moe. Notes add to discuss paperwork  regarding disability benefits.

## 2021-01-18 ENCOUNTER — VIRTUAL VISIT (OUTPATIENT)
Dept: INTERNAL MEDICINE | Facility: CLINIC | Age: 56
End: 2021-01-18
Payer: MEDICARE

## 2021-01-18 DIAGNOSIS — Z86.79 HISTORY OF CORONARY ARTERY DISEASE: ICD-10-CM

## 2021-01-18 DIAGNOSIS — N18.31 STAGE 3A CHRONIC KIDNEY DISEASE (H): ICD-10-CM

## 2021-01-18 DIAGNOSIS — Z00.00 MEDICARE ANNUAL WELLNESS VISIT, INITIAL: Primary | ICD-10-CM

## 2021-01-18 DIAGNOSIS — F41.9 ANXIETY: ICD-10-CM

## 2021-01-18 DIAGNOSIS — E11.3293 TYPE 2 DIABETES MELLITUS WITH MILD NONPROLIFERATIVE RETINOPATHY OF BOTH EYES WITHOUT MACULAR EDEMA, UNSPECIFIED WHETHER LONG TERM INSULIN USE (H): ICD-10-CM

## 2021-01-18 DIAGNOSIS — I10 HYPERTENSION, UNSPECIFIED TYPE: ICD-10-CM

## 2021-01-18 DIAGNOSIS — Z94.4 STATUS POST LIVER TRANSPLANTATION (H): ICD-10-CM

## 2021-01-18 DIAGNOSIS — Z94.4 LIVER TRANSPLANT RECIPIENT (H): ICD-10-CM

## 2021-01-18 DIAGNOSIS — D84.9 IMMUNOSUPPRESSED STATUS (H): ICD-10-CM

## 2021-01-18 DIAGNOSIS — C22.0 HCC (HEPATOCELLULAR CARCINOMA) (H): ICD-10-CM

## 2021-01-18 PROCEDURE — G0439 PPPS, SUBSEQ VISIT: HCPCS | Mod: 95 | Performed by: INTERNAL MEDICINE

## 2021-01-18 RX ORDER — CARVEDILOL 3.12 MG/1
3.12 TABLET ORAL 2 TIMES DAILY WITH MEALS
Qty: 60 TABLET | Refills: 2 | Status: SHIPPED | OUTPATIENT
Start: 2021-01-18 | End: 2021-04-09

## 2021-01-18 NOTE — Clinical Note
Please double check my coding.  I was not able to reach him by video for 20 minutes, then called him twice, reached him on the second try, and that audio lasted 21 minutes.  It was a Medicare Wellness and a blood pressure management visit.  Precharting was for 30 minutes.  Thanks. YURIY

## 2021-01-18 NOTE — PROGRESS NOTES
"This patient is being evaluated via a billable audio and/o video visit as an alternative to an in-person visit.       The patient has been notified of following:     \"This video visit will be conducted via a call between you and your physician/provider. We have found that certain health care needs can be provided without the need for an in-person physical exam.  This service lets us provide the care you need with a video conversation.  If a prescription is necessary we can send it directly to your pharmacy.  If lab work is needed we can place an order for that and you can then stop by our lab to have the test done at a later time. If during the course of the call the physician/provider feels a video visit is not appropriate, you will not be charged for this service.\"     Patient has given verbal consent for virtual video and/or audio visit? Yes  Did patient initiate this virtual visit? Yes    Person spoken to:  Patient    This was a synchronous virtual visit  Location of patient: home  Location of physician:  home office  Department name:  Medicine  Mode of communication:  Video Conference via [employer] unsuccessful after trying from 1:05-1:20.  Called twice, and finally got through via audio at 1:20,  Call ended 1:41 pm  Total audio call 21 min    Time video initiated:    Time video ended:    Total length of video visit:    Nerissa Moe M.D.  Internal Medicine  [facility]       Patients: if you have questions or concerns about this progress note, please discuss them with the provider at a future office visit.    "

## 2021-01-18 NOTE — NURSING NOTE
Chief Complaint   Patient presents with     Medicare Visit     medicare visit.      Video Visit Technology for this patient: Odette not working, patient has smart device, please try [employer] Video with patient     Barb Bryant LPN at 12:32 PM on 1/18/2021.

## 2021-01-18 NOTE — PROGRESS NOTES
Pre chartin20  10:45 a.m.-11:15 a.m.  Total time: 30 min  Date of pre charting:   This effort is essential to preparing for upcoming service directly affecting ongoing primary care management and coordination of care for this patient for the same day office visit.  Person performing pre charting work:  Dr. Moe  This non face-to-face clinical work included review/documentation of medical history, medications, vital sign trends,  routine health care maintenance, specialist notes, laboratory, procedures, imaging).    Virtual visit    CC:  Medicare Wellness, Blood pressure f/u    Other health care team members:  PharmD LORIE Blum, Arie Welsh, Donald Bradley  Cardiology Dr. LORIE Ireland  GI/hepatology Dr. DINO Banuelos  Transplant Dr. SHAD Ramos,    Transplant coordinator ?Radha Ndiaye RN  Podiatry Dr. LORIE Gonzalez  Endocrinology LILLIAN Toscano,  Dr. Wilcox  Psychiatry Dr. RAMAN Davison (in past)  Psychology:  Joseph Murillo, RED  Nephrology Dr. BRYANT Rao  Oncology NP AZIZA Wilson, Dr. GARFIELD Villarreal  Dermatology Dr. ALEX Smith  Ophthalmology Dr. KELLI Martin  Transplant  Dialn Pratt  Radiology Dr. 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  Per his recollection, it was discontinued because of low blood pressure.  Will restart at 3.125 bid and he will continue to monitor his home blood pressures carefully.    HCM:  Colonoscopy 19  Immunizations up to date except for Shingrix #2  See most recent, pertinent lab results below  Advance Directives and POLST on file as of  .  HIV negative 11/10/19  Hep C negative 11/10/19  PSA normal 19  Home BGs  reviewed  Home BP's reviewed.  Sees psychologist every 2 weeks and anxiety has improved.    Cognitive screening:  Today Miller readily recalls his medical history and answers all of my questions today without signs of cognitive deficits.    Miller most recently saw:  RED Murillo on 1/14/21  Most recent notes from ophthalmology, GI, oncology and nephrology were reviewed.    Upcoming appointments:  Endo 1/26/21  Psychology 2/3/21  Nephrology 3/1/21  Ophth 3/3/21  GI 3/29/21  Oncology 3/29/21    ROS:  No other reported acute concerns    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, unspecified whether long term insulin use (H)     Status post coronary angiogram     CAD (coronary artery disease)     Liver transplant recipient (H)     Status post liver transplantation (H)     Immunosuppressed status (H)     HTN (hypertension)     Malnutrition related to chronic disease (H)     Hypophosphatasia     CKD (chronic kidney disease), stage III     Malnutrition (H)     Anemia     Anxiety     Mild recurrent major depression (H)     Low hemoglobin     CKD (chronic kidney disease) stage 4, GFR 15-29 ml/min (H)     Anemia of chronic renal failure, stage 4 (severe) (H)     Rekha (H)     Past Surgical History:   Procedure Laterality Date     COLONOSCOPY      2015     COLONOSCOPY N/A 12/6/2019    Procedure: COLONOSCOPY, WITH POLYPECTOMY AND BIOPSY;  Surgeon: Adam Morton MD;  Location:  GI     CV HEART CATHETERIZATION WITH POSSIBLE INTERVENTION N/A 2/26/2019    Procedure: CORS;  Surgeon: Jagdish Hoyt MD;  Location:  HEART CARDIAC CATH LAB      ESOPHAGOSCOPY, GASTROSCOPY, DUODENOSCOPY (EGD), COMBINED N/A 2016    Procedure: COMBINED ESOPHAGOSCOPY, GASTROSCOPY, DUODENOSCOPY (EGD);  Surgeon: Santi Rosas MD;  [facility]     ESOPHAGOSCOPY, GASTROSCOPY, DUODENOSCOPY (EGD), COMBINED N/A 2017    Procedure: COMBINED ESOPHAGOSCOPY, GASTROSCOPY, DUODENOSCOPY (EGD);  EGD;  Surgeon: Santi Rosas MD;  Location:  GI     ESOPHAGOSCOPY, GASTROSCOPY, DUODENOSCOPY (EGD), COMBINED N/A 2018    Procedure: EGD;  Surgeon: Santi Rosas MD;  [facility]     ESOPHAGOSCOPY, GASTROSCOPY, DUODENOSCOPY (EGD), COMBINED N/A 2019    Procedure: ESOPHAGOGASTRODUODENOSCOPY, WITH BIOPSY;  Surgeon: Adam Morton MD;  Location:  GI     ESOPHAGOSCOPY, GASTROSCOPY, DUODENOSCOPY (EGD), COMBINED N/A 2020    Procedure: ESOPHAGOGASTRODUODENOSCOPY (EGD);  Surgeon: Santi Rosas MD;  Location:  GI     HEAD & NECK SURGERY      2017 at [facility].      IMPLANT GOLD WEIGHT EYELID Right 2017    Procedure: IMPLANT WEIGHT EYELID;  Right Upper Eyelid Weight, right tarsal strip lower eyelid;  Surgeon: Milana Malave MD;  [facility]     IR CHEMO EMBOLIZATION  2019     KNEE SURGERY Left      ORTHOPEDIC SURGERY       PAROTIDECTOMY, RADICAL NECK DISSECTION Right 2017    Procedure: PAROTIDECTOMY, RADICAL NECK DISSECTION;  Right Superfacial Parotidectomy , Facial nerve repair. with [facility] facial nerve monitor.;  Surgeon: Asiya Morgan MD;  [facility]     PICC INSERTION Left 2017    4fr SL BioFlo PICC, 44cm in the L basilic vein w/ tip in the low SVC     RETURN LIVER TRANSPLANT N/A 2019    Procedure: Exploratory laparotomy, hematoma evacuation, abdominal washout;  Surgeon: Александр Ramos MD;  [facility]     TRANSPLANT LIVER RECIPIENT  DONOR N/A 2019    Procedure: TRANSPLANT, LIVER, RECIPIENT,  DONOR;  Surgeon: Александр Ramos MD;  Location:  OR      VASCULAR SURGERY       Family History   Problem Relation Age of Onset     Prostate Cancer Maternal Grandfather      Substance Abuse Maternal Grandfather         Alcohol     Colon Cancer Father 60     Pancreatic Cancer Father 60     Prostate Cancer Father      Colorectal Cancer Father      Macular Degeneration Father      Cancer Father      Glaucoma Father      Skin Cancer Father      Colorectal Cancer Maternal Grandmother      Cancer Maternal Grandmother      Substance Abuse Maternal Grandmother         Alcohol     Colorectal Cancer Paternal Grandmother      Cancer Mother      Diabetes Mother          3/2016     Cerebrovascular Disease Mother         Passed away in Feb of this year, 80 years old.     Thyroid Disease Mother      Depression Mother      Asthma Sister         Had since birth     Thyroid Disease Sister      Depression Sister      Liver Disease No family hx of      Melanoma No family hx of      Social History     Tobacco Use     Smoking status: Former Smoker     Packs/day: 6.00     Years: 30.00     Pack years: 180.00     Types: Cigars     Start date: 2016     Quit date: 10/25/2017     Years since quitting: 3.2     Smokeless tobacco: Former User     Types: Chew     Quit date: 10/31/2017     Tobacco comment: 1 tin per week   Substance Use Topics     Alcohol use: No     Alcohol/week: 0.0 standard drinks     Frequency: Never     Drinks per session: Patient refused     Binge frequency: Never     Comment: quit 1996     Drug use: No       Current Outpatient Medications   Medication Sig Dispense Refill     Acetaminophen (TYLENOL) 325 MG CAPS Take 325-650 mg by mouth every 4 hours as needed (pain, fever) 100 capsule 1     ARIPiprazole (ABILIFY) 5 MG tablet daily       Artificial Tear Solution (SM ARTIFICIAL TEARS) SOLN Place 1 drop into the right eye every hour as needed (dry eyes) Apply at least 4 times daily and as needed for dry eye 15 mL 1     aspirin 81 MG EC tablet Take 1 tablet (81 mg) by  mouth daily 90 tablet 1     BD VIKTORIA U/F 32G X 4 MM insulin pen needle Use 5 per day 300 each 3     ciclopirox (LOPROX) 0.77 % cream Apply topically 2 times daily To feet and toenails. 90 g 5     Continuous Blood Gluc  (FREESTYLE CLAUDIA 14 DAY READER) CECILIO Use to read blood sugars as per 's instructions. 1 each 0     Continuous Blood Gluc Sensor (FREESTYLE CLAUDIA 14 DAY SENSOR) MISC Change every 14 days. 2 each 11     empagliflozin (JARDIANCE) 10 MG TABS tablet Take 1 tablet (10 mg) by mouth daily For diabetes.  Hold if with fever or vomiting. 30 tablet 3     glucose (BD GLUCOSE) 4 g chewable tablet Take 1 tablet by mouth every hour as needed for low blood sugar 60 tablet 1     insulin aspart (NOVOLOG PEN) 100 UNIT/ML pen Inject 1-15 Units Subcutaneous 3 times daily (with meals) 20 mL 3     insulin degludec (TRESIBA FLEXTOUCH) 100 UNIT/ML pen Inject 27 units subcutaneously daily 25 mL 3     lamiVUDine (EPIVIR) 100 MG tablet Take 1 tablet (100 mg) by mouth daily 30 tablet 3     magnesium oxide (MAG-OX) 400 MG tablet Take 1 tablet (400 mg) by mouth every evening 90 tablet 3     Multiple Vitamin (THERAVITE PO) Take 1 tablet by mouth every morning        order for DME 1 Device by Device route daily Knee high compression socks 15-20 mmhg. 1 Device 0     pantoprazole (PROTONIX) 40 MG EC tablet TAKE ONE TABLET BY MOUTH EVERY MORNING BEFORE BREAKFAST 90 tablet 3     polyethylene glycol (MIRALAX) 17 g packet Take 1 packet by mouth daily       rosuvastatin (CRESTOR) 5 MG tablet Take 1 tablet (5 mg) by mouth daily 90 tablet 3     tacrolimus (GENERIC EQUIVALENT) 1 MG capsule Take 2 capsules (2 mg) by mouth every 12 hours 360 capsule 3     tamsulosin (FLOMAX) 0.4 MG capsule TAKE 1 CAPSULE(0.4 MG) BY MOUTH DAILY 90 capsule 1     vitamin B-12 (CYANOCOBALAMIN) 1000 MCG tablet Take 1 tablet (1,000 mcg) by mouth daily 30 tablet 11     vitamin D3 (CHOLECALCIFEROL) 2000 units (50 mcg) tablet Take 1 tablet (2,000 Units)  by mouth daily 90 tablet 3     Allergies   Allergen Reactions     Codeine Other (See Comments)     Cannot take due to liver  Cannot tolerate oral narcotics     Seasonal Allergies      Sneezing, coughing, runny and itchy eyes     Home BP's:  1/4/20.    151/81/84  1/5/20.    118/20/90  1/6/20.   134/69/92  1/7/20.   134/78/83  1/8/20.   135/75/88  1/9/20.   142/80/90  1/10/20.  134/73/98  1/11/20.  139/87/98  1/12/20   140/88/92  1/13/20.  132/68/92  1/14/20.  134/64/94  1/15/20   157/96/105  1/16/220 135/88/92  1/17/20.  125/65/95    Most recent, pertinent labs reviewed (see below)    Component      Latest Ref Rng & Units 12/30/2020   Sodium      133 - 144 mmol/L 138   Potassium      3.4 - 5.3 mmol/L 4.2   Chloride      94 - 109 mmol/L 107   Carbon Dioxide      20 - 32 mmol/L 27   Anion Gap      3 - 14 mmol/L 5   Glucose      70 - 99 mg/dL 236 (H)   Urea Nitrogen      7 - 30 mg/dL 40 (H)   Creatinine      0.66 - 1.25 mg/dL 1.79 (H)   GFR Estimate      >60 mL/min/1.73:m2 41 (L)     Component      Latest Ref Rng & Units 9/25/2020   Cholesterol      <200 mg/dL 146   Triglycerides      <150 mg/dL 228 (H)   HDL Cholesterol      >39 mg/dL 39 (L)   LDL Cholesterol Calculated      <100 mg/dL 61     Component      Latest Ref Rng & Units 6/29/2020   Albumin Urine mg/g Cr      0 - 17 mg/g Cr 374.05 (H)     Component      Latest Ref Rng & Units 12/30/2020   Hemoglobin A1C      0 - 5.6 % 6.0 (H)     Component      Latest Ref Rng & Units 6/29/2020   Protein Random Urine      g/L 0.47   Protein Total Urine g/gr Creatinine      0 - 0.2 g/g Cr 0.89 (H)     Frandy was seen today for medicare visit.    Diagnoses and all orders for this visit:    Medicare annual wellness visit, initial    Type 2 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, unspecified whether long term insulin use (H)    Status post liver transplantation (H)    Immunosuppressed status (H)    Hypertension, unspecified  type  -     carvedilol (COREG) 3.125 MG tablet; Take 1 tablet (3.125 mg) by mouth 2 times daily (with meals)    HCC (hepatocellular carcinoma) (H)    Stage 3a chronic kidney disease    Anxiety    History of coronary artery disease    Liver transplant recipient (H)      Routine f/u in April.    Nerissa Moe M.D.  Internal Medicine  [facility]     Patients: if you have questions or concerns about this progress note, please discuss them with the provider at a future office visit.

## 2021-01-18 NOTE — PATIENT INSTRUCTIONS
"If you have questions regarding Covid-19 and the Covid-19 vaccine, please visit this website.    [email or web address]      Here is a summary of today's Medicare Annual Wellness visit:    You completed a questionnaire called a \"Health Risk Assessment\".   We discussed general health advice  Your medical and family history were reviewed today  Your height, weight, blood pressure and other vital signs were measured today.    You do not appear to have any significant cognitive impairment at the time of this visit.  We reviewed any recommendations for screening tests  Refer to your Medicare coverage for screenings, shots and other preventative services.  If you haven't already, complete your Advance Directives and forward it to our clinic.  We reviewed your vaccination recommendations.    Medicare Wellness visits are typically covered once every 12 months.  They are not the same as a \"routine\" or \"annual\" physical. Routine/annual physicals are typically NOT covered by Medicare.  Medicare Wellness visits do not include a \"routine physical exam\" and do not cover any specific medical problems or complaints.    You may have to make a separate visit to address specific problems and review your chronic medical conditions.    "

## 2021-01-18 NOTE — PROGRESS NOTES
Medicare Wellness Health Risk Assessment Questionnaire  Dr. Nerissa Moe updated 2019    What is your marital status?     Who lives in your household? me    Does your home have loose rugs in the hallway:   Yes    Does your home have grab bars in the bathroom:  No    Does your home have handrails on the stairs? Yes yes    Does your home have poorly lit areas?  No    How many times have you fallen in the last year? 2x    How many times have you been to the Emergency Room in the last year?  3    How many times have you been hospitalized in the last year?   2x    Do you feel threatened or controlled by a partner, ex-partner or anyone in your life?    No    Has anyone hurt you physically, for example by pushing, hitting, slapping or kicking you   or forcing you to have sex? No    Are you sexually active?  Yes    If yes, with men, women, or both? Women   If yes, do you more than one current partner? No    If yes, are you using condoms? Yes    Have you had any sexually transmitted infections in the last year? No    Do you have any sexual concerns?  Yes, ED    Do you need help with the phone, transportation, shopping, preparing meals, housework, laundry, medications or managing money?  Yes- if pt loose insurance    Have you noticed any hearing difficulties?  Yes  Do you wear hearing aids? No    Have you seen a hearing professional such as an audiologist in the last 1 year?  No    Do you have vision difficulty? Yes    Do you wear glasses or contacts? Yes    Have you seen an eye doctor in the last 1 year? Yes    How many servings of fruits and vegetables do you eat a day? 0-1 a day    How often do you exercise in a week?  Not a lot lately having pain in hip.     What kind of exercise do you do? Walks prn    Do you wear a seatbelt when driving or riding in a vehicle?  Yes     Do you use tobacco?  No    Do you use e-cigarettes? No    Do you use any other drugs? No         Do you drink alcohol?  No    Over the  last 2 weeks, how often have you been bothered by feeling down, depressed, or hopeless? 0-1 (not at all to several days)    Over the last 2 weeks, how often have you had little interest or pleasure in doing things? Not at all (0)     Sees a therapist every other week, and is satisfied with therapy, anxiety has improved.    Have you completed an Advance Directives document? Yes    If yes, have you given a copy to the clinic? yes    Do you need information on Advance Directives? No      Barb Bryant LPN at 12:31 PM on 1/18/2021.

## 2021-01-20 ENCOUNTER — TELEPHONE (OUTPATIENT)
Dept: TRANSPLANT | Facility: CLINIC | Age: 56
End: 2021-01-20

## 2021-01-20 DIAGNOSIS — Z94.4 LIVER REPLACED BY TRANSPLANT (H): ICD-10-CM

## 2021-01-20 LAB
ALBUMIN SERPL-MCNC: 4.2 G/DL (ref 3.4–5)
ALP SERPL-CCNC: 140 U/L (ref 40–150)
ALT SERPL W P-5'-P-CCNC: 33 U/L (ref 0–70)
ANION GAP SERPL CALCULATED.3IONS-SCNC: 4 MMOL/L (ref 3–14)
AST SERPL W P-5'-P-CCNC: 17 U/L (ref 0–45)
BILIRUB DIRECT SERPL-MCNC: 0.2 MG/DL (ref 0–0.2)
BILIRUB SERPL-MCNC: 0.6 MG/DL (ref 0.2–1.3)
BUN SERPL-MCNC: 39 MG/DL (ref 7–30)
CALCIUM SERPL-MCNC: 9.7 MG/DL (ref 8.5–10.1)
CHLORIDE SERPL-SCNC: 107 MMOL/L (ref 94–109)
CO2 SERPL-SCNC: 27 MMOL/L (ref 20–32)
CREAT SERPL-MCNC: 1.62 MG/DL (ref 0.66–1.25)
ERYTHROCYTE [DISTWIDTH] IN BLOOD BY AUTOMATED COUNT: 13.5 % (ref 10–15)
GFR SERPL CREATININE-BSD FRML MDRD: 46 ML/MIN/{1.73_M2}
GLUCOSE SERPL-MCNC: 147 MG/DL (ref 70–99)
HCT VFR BLD AUTO: 39 % (ref 40–53)
HGB BLD-MCNC: 13.3 G/DL (ref 13.3–17.7)
MAGNESIUM SERPL-MCNC: 2.4 MG/DL (ref 1.6–2.3)
MCH RBC QN AUTO: 32.8 PG (ref 26.5–33)
MCHC RBC AUTO-ENTMCNC: 34.1 G/DL (ref 31.5–36.5)
MCV RBC AUTO: 96 FL (ref 78–100)
PLATELET # BLD AUTO: 128 10E9/L (ref 150–450)
POTASSIUM SERPL-SCNC: 4.9 MMOL/L (ref 3.4–5.3)
PROT SERPL-MCNC: 8.4 G/DL (ref 6.8–8.8)
RBC # BLD AUTO: 4.05 10E12/L (ref 4.4–5.9)
SODIUM SERPL-SCNC: 138 MMOL/L (ref 133–144)
TACROLIMUS BLD-MCNC: 4 UG/L (ref 5–15)
TME LAST DOSE: ABNORMAL H
WBC # BLD AUTO: 5.9 10E9/L (ref 4–11)

## 2021-01-20 PROCEDURE — 80048 BASIC METABOLIC PNL TOTAL CA: CPT | Performed by: PATHOLOGY

## 2021-01-20 PROCEDURE — 80197 ASSAY OF TACROLIMUS: CPT | Performed by: PATHOLOGY

## 2021-01-20 PROCEDURE — 83735 ASSAY OF MAGNESIUM: CPT | Performed by: PATHOLOGY

## 2021-01-20 PROCEDURE — 85027 COMPLETE CBC AUTOMATED: CPT | Performed by: PATHOLOGY

## 2021-01-20 PROCEDURE — 36415 COLL VENOUS BLD VENIPUNCTURE: CPT | Performed by: PATHOLOGY

## 2021-01-20 PROCEDURE — 80076 HEPATIC FUNCTION PANEL: CPT | Performed by: PATHOLOGY

## 2021-01-25 DIAGNOSIS — E11.22 TYPE 2 DIABETES MELLITUS WITH STAGE 3 CHRONIC KIDNEY DISEASE, WITH LONG-TERM CURRENT USE OF INSULIN (H): ICD-10-CM

## 2021-01-25 DIAGNOSIS — N18.30 TYPE 2 DIABETES MELLITUS WITH STAGE 3 CHRONIC KIDNEY DISEASE, WITH LONG-TERM CURRENT USE OF INSULIN (H): ICD-10-CM

## 2021-01-25 DIAGNOSIS — Z79.4 TYPE 2 DIABETES MELLITUS WITH STAGE 3 CHRONIC KIDNEY DISEASE, WITH LONG-TERM CURRENT USE OF INSULIN (H): ICD-10-CM

## 2021-01-25 NOTE — PATIENT INSTRUCTIONS
Dear Miller,    Good to visit with you!    I am sorry to see your blood sugar rising.    Please increase Tresiba to 28 units daily.  Goal is to have fasting blood sugar in the morning 90 - 140, with no LOW BG <70 overnight.      Also, please do increase Jardiance to 25 mg daily when new prescription.   It is important to hold this medication if you have an illness where you can't keep down fluid (vomitting and diarrhea) and are at risk of being dehydrated.      If you need further blood pressure medication, please ask your providers about medications such as Lisinopril or Losartan which would protect your kidneys from diabetic kidney disease.     -  - -         We appreciate your assistance in coordinating your healthcare.     Please upload your insulin pump, blood sugar meter and/or continuous glucose monitor at home 1-2 days before your next diabetes-related appointment.   This will allow your provider to review your  data before your scheduled virtual visit.     - - - -     It is good to speak with you today!    Labs, and clinics are still open up for select services.  You do need to call ahead to learn the procedures to get your flu shot, eye exam and labs, but please do consider bringing all of these recommend cares up to date in the coming months.  Please contact us to schedule at any of our [facility] lab locations  Call [phone] ([phone]), select option 1.    For [facility] lab at [address] [place] [address], call : [phone].    Our clinic has limited availability to be seen in person and I encourage you to schedule with myself, nutrition our diabetic educators, or your primary care provider for a foot exam together with any other needed in person care when you feel comfortable doing so.  Our diabetic foot providers, Dr. Gonzalez and Kamille are also available on a limited basis to see patients with pedal ulcers or other concerns.       Please work to meet recommendations for physical activity which is 30  minutes aerobic activity at 6 days each week and resistance training 3 times /week.  The following resources might be helpful for you, particularly as the weather turns colder.  Both have free unlimited classes online for every age and ability.      [email or web address]  [email or web address]    Also many heart healthy food ideas are available at:  [email or web address].[email or web address]/    Please let us know any time you would like to schedule with nutrition or diabetes educator; most insurers cover 2 - 6 hours of education annually and I see better outcomes in patients who take advantage of this.  Knowledge is power of course!    My best wishes,    Tish Toscano PA-C, [facility]  [facility]  Diabetes, Endocrinology, and Metabolism  [phone] Appointments/Nurse  [phone] Fax  [phone] nurse line  [phone] URGENTafter hours/weekend Endocrinologist on call          To ask a question to your Endocrine care team, please send them a [employer] message, or reach them by phone at [phone]     To expedite your medication refill(s), please contact your pharmacy and have them   fax a refill request to: [phone].  *Please allow 3 business days for routine medication refills.  *Please allow 5 business days for controlled substance medication refills.    For after-hours urgent Endocrine issues, that do not require 911, please dial [phone], and ask to speak with the Endocrinologist On-Call

## 2021-01-25 NOTE — PROGRESS NOTES
Diabetes Virtual Consult Note  2021      Frandy Workman is a 55 year old male with a past medical history including  has a past medical history of Anemia (), Arthritis, BPH (benign prostatic hyperplasia), CAD (coronary artery disease) (2019), Cholelithiasis, Conductive hearing loss (2017), Depressive disorder (), Gastroesophageal reflux disease (2014), HCC (hepatocellular carcinoma) (H) (2019), History of diabetic retinopathy (2018), HTN (hypertension), HTN (hypertension) (2019), Hyperlipidemia, Liver cirrhosis secondary to ESTRADA (H), Liver transplanted (H) (2019), Portal vein thrombosis (2019), and Type II diabetes mellitus (H). He also has no past medical history of Asymptomatic human immunodeficiency virus (HIV) infection status (H), Cerebral infarction (H), Congestive heart failure (H), COPD (chronic obstructive pulmonary disease) (H), History of blood transfusion, Infectious mononucleosis, PONV (postoperative nausea and vomiting), Thyroid disease, Tuberculosis, or Uncomplicated asthma. He also struggles with BPAD.      When I spoke to Miller in late September he continued to have high BG  after having started Abilify over the summer and becoming more l sedentary, tired, lethargic, hungry, sleeping more since starting the medication with weight increasing to 172 lbs and average BG increased to 231 with high BG overnight.  We increased Tresiba to 27 units daily, carbohydrate coverage to 1 unit : 15 g of carbohydrate, continued 1unit Novolo mg /dl >150, and added  Empagliflozin 10 mg daily.  In late 2020 he was doing well with this, but since has contacted me concerned about night sweats and I have asked him to check his BG over night; they were fine at that time.      I last saw Miller 20 and DM was well managed with avg  per Benjamin.    He is starting Carvedilol 3.125 bid for high blood pressure.      Recommended  continue:      - Tresiba 27 units /day.   -  carbohydrate coverage to 1 unit : 15 g of carbohydrate.   -  Correction Novolounit Novolo mg /dl >150     - Empagliflozin 10 mg daily.  Add exercise early in day.  Reccomend a mix of weight bearing and aerobic exercise to meet ADA recommendations.       Consider nutrition heart healthy diet, weight maintenance diet.    Continue Seeing retina specialist, taking statin.  Consider ACe i ARB due to elevated microalbuminuria and CAD if tolerated (had lower BP).      Interim:    He is having a hard time getting Benjamin as runs out last week each month and can't renew.  He notes he gets out of Novolog sliding scale insulin habit end of each month and hard to resume - gets back to only another week or so after resumes Benjamin each month and sometimes forgets all together once habit gone.      Highest 157/ 96 with pulse 105 highest BP recorded in last month.      Night sweats - at times with heart racing but not last night.  Undershirt and sweats are wet.  Will awake in middle of night. Last night started at 3 am which is typical and eventually could fall back asleep around 4:30 am after changing clothes.  At times awakes feeling slightly frightened heart racing slightly as had a bad dream. Not usually.  Does have anxiety, that may not be optimally managed, but doubts any PTSD.    Has not been homeless known exposure to TB since last checked for prior to transplant.      Also up q 2-3 hours overnight.      Dm Regimen:   - Tresiba 27 units /day.   -  carbohydrate coverage to 1 unit : 15 g of carbohydrate.   -  Correction Novolounit Novolo mg /dl >180     - Empagliflozin 10 mg daily.      We reviewed glucometer, pump and CGMS data together.  It revealed:          His avg BG has increased from 162 and he is not scanning as regularly. No hypoglycemia recorded, overnight BG appears reassuring.  Night sweats do not appears related to BG which is rather stable overnight  generally.      History of Diabetes monitoring and complications/ prevention:  CAD: yes 4/2019  Last eye exam results:8/20/20  - referred to retina specialists - considering injection.    Microalbuminuria: yes - has CKD, not currently on Ace or ARB, sees Cardiology , Neprhology  HTN: past  On statin: yes  On ASA:yes  Depression:yes  - sees psych  ED:did not inquire    Frandy's PMH reviewed with him.      Current Outpatient Medications   Medication     Acetaminophen (TYLENOL) 325 MG CAPS     ARIPiprazole (ABILIFY) 5 MG tablet     Artificial Tear Solution (SM ARTIFICIAL TEARS) SOLN     aspirin 81 MG EC tablet     BD VIKTORIA U/F 32G X 4 MM insulin pen needle     carvedilol (COREG) 3.125 MG tablet     ciclopirox (LOPROX) 0.77 % cream     Continuous Blood Gluc  (FREESTYLE CLAUDIA 14 DAY READER) CECILIO     Continuous Blood Gluc Sensor (FREESTYLE CLAUDIA 14 DAY SENSOR) MISC     empagliflozin (JARDIANCE) 10 MG TABS tablet     glucose (BD GLUCOSE) 4 g chewable tablet     insulin aspart (NOVOLOG PEN) 100 UNIT/ML pen     insulin degludec (TRESIBA FLEXTOUCH) 100 UNIT/ML pen     lamiVUDine (EPIVIR) 100 MG tablet     Multiple Vitamin (THERAVITE PO)     order for DME     pantoprazole (PROTONIX) 40 MG EC tablet     polyethylene glycol (MIRALAX) 17 g packet     rosuvastatin (CRESTOR) 5 MG tablet     tacrolimus (GENERIC EQUIVALENT) 1 MG capsule     tamsulosin (FLOMAX) 0.4 MG capsule     vitamin B-12 (CYANOCOBALAMIN) 1000 MCG tablet     vitamin D3 (CHOLECALCIFEROL) 2000 units (50 mcg) tablet     Current Facility-Administered Medications   Medication     Aflibercept (EYLEA) injection 2 mg              Frandy's family history includes Asthma in his sister; Cancer in his father, maternal grandmother, and mother; Cerebrovascular Disease in his mother; Colon Cancer (age of onset: 60) in his father; Colorectal Cancer in his father, maternal grandmother, and paternal grandmother; Depression in his mother and sister; Diabetes in his mother;  "Glaucoma in his father; Macular Degeneration in his father; Pancreatic Cancer (age of onset: 60) in his father; Prostate Cancer in his father and maternal grandfather; Skin Cancer in his father; Substance Abuse in his maternal grandfather and maternal grandmother; Thyroid Disease in his mother and sister.    ROS:   Patient denies any fevers, chills or sweats as well as any changes in or problems with vision, pain or problems with dentition, new or different headaches.  Patient denies symptoms of hypo and hyperglycemia except as above.   Patients denies marked fatigue, cough, shortness of breath, chest pain or pressure.  There has been no pain with or other changes in urination or  itching or pain in genital areas.  Patient denies any noted swelling in feet, ankles or otherwise, loss of sensation or pain  in feet or other areas.    Patient also denies current difficulties with depressed mood, anhedonia or worrying too much.        Exam:    PHONE VISIT        Wt Readings from Last 10 Encounters:   10/14/20 78 kg (171 lb 14.4 oz)   09/30/20 78.8 kg (173 lb 12.8 oz)   09/25/20 (P) 79 kg (174 lb 1.6 oz)   09/16/20 77.6 kg (171 lb)   08/19/20 76.9 kg (169 lb 8 oz)   08/12/20 76.8 kg (169 lb 4.8 oz)   07/29/20 75.6 kg (166 lb 9.6 oz)   07/28/20 75.8 kg (167 lb)   07/15/20 73.4 kg (161 lb 12.8 oz)   07/01/20 70.3 kg (155 lb)         Frandy is alerted and oriented.  Mood is \"good,\" affect is congruent.  Thoughtful form and content are fluid and coherent.  No signs of distress are appreciated.      Data:      Most recent:  Lab Results   Component Value Date    CR 1.95 10/14/2020     Lab Results   Component Value Date     10/14/2020      Anion Gap 3 - 14 mmol/L 4     Glucose 70 - 99 mg/dL 147High      Urea Nitrogen 7 - 30 mg/dL 39High      Creatinine 0.66 - 1.25 mg/dL 1.62High      GFR Estimate >60 mL/min/1.73_m2 46Low       On 1/20/21.      Lab Results   Component Value Date    A1C 6.0 (H) 12/30/2020    A1C 6.3 (H) " 06/13/2020    A1C 6.6 (H) 08/08/2018    A1C 6.5 (H) 06/09/2017    A1C 7.8 (H) 10/25/2016    HEMOGLOBINA1 4.8 03/04/2020    HEMOGLOBINA1 5.1 12/02/2019    HEMOGLOBINA1 5.4 08/14/2019    HEMOGLOBINA1 6.1 (A) 02/11/2019    HEMOGLOBINA1 8.1 (A) 04/11/2018     Lab Results   Component Value Date    MICROL 196 06/29/2020     No results found for: MICROALBUMIN  No results found for: CPEPT, GADAB, ISCAB  Cholesterol   Date Value Ref Range Status   09/25/2020 146 <200 mg/dL Final   07/29/2020 192 <200 mg/dL Final     HDL Cholesterol   Date Value Ref Range Status   09/25/2020 39 (L) >39 mg/dL Final   07/29/2020 39 (L) >39 mg/dL Final     LDL Cholesterol Calculated   Date Value Ref Range Status   09/25/2020 61 <100 mg/dL Final     Comment:     Desirable:       <100 mg/dl   07/29/2020 117 (H) <100 mg/dL Final     Comment:     Above desirable:  100-129 mg/dl  Borderline High:  130-159 mg/dL  High:             160-189 mg/dL  Very high:       >189 mg/dl       Triglycerides   Date Value Ref Range Status   09/25/2020 228 (H) <150 mg/dL Final     Comment:     Borderline high:  150-199 mg/dl  High:             200-499 mg/dl  Very high:       >499 mg/dl     07/29/2020 181 (H) <150 mg/dL Final     Comment:     Borderline high:  150-199 mg/dl  High:             200-499 mg/dl  Very high:       >499 mg/dl       No results found for: CHOLHDLRATIO      GFR Estimate   Date Value Ref Range Status   01/20/2021 46 (L) >60 mL/min/[1.73_m2] Final     Comment:     Non  GFR Calc  Starting 12/18/2018, serum creatinine based estimated GFR (eGFR) will be   calculated using the Chronic Kidney Disease Epidemiology Collaboration   (CKD-EPI) equation.     12/30/2020 41 (L) >60 mL/min/[1.73_m2] Final     Comment:     Non  GFR Calc  Starting 12/18/2018, serum creatinine based estimated GFR (eGFR) will be   calculated using the Chronic Kidney Disease Epidemiology Collaboration   (CKD-EPI) equation.     12/02/2020 49 (L) >60  mL/min/[1.73_m2] Final     Comment:     Non  GFR Calc  Starting 12/18/2018, serum creatinine based estimated GFR (eGFR) will be   calculated using the Chronic Kidney Disease Epidemiology Collaboration   (CKD-EPI) equation.         TSH ordered in November not yet drawn.      CBC BMP Hep panel from 1/20 stable, reassuring.  Unrevealing.            Assessment/Plan:    Frandy is a 55 year old male with  has a past medical history of Anemia (2013), Arthritis, BPH (benign prostatic hyperplasia), CAD (coronary artery disease) (4/1/2019), Cholelithiasis, Conductive hearing loss (08/16/2017), Depressive disorder (1986), Gastroesophageal reflux disease (12/01/2014), HCC (hepatocellular carcinoma) (H) (1/22/2019), History of diabetic retinopathy (07/2018), HTN (hypertension), HTN (hypertension) (11/20/2019), Hyperlipidemia, Liver cirrhosis secondary to ESTRADA (H), Liver transplanted (H) (11/11/2019), Portal vein thrombosis (4/11/2019), and Type II diabetes mellitus (H). He also has no past medical history of Asymptomatic human immunodeficiency virus (HIV) infection status (H), Cerebral infarction (H), Congestive heart failure (H), COPD (chronic obstructive pulmonary disease) (H), History of blood transfusion, Infectious mononucleosis, PONV (postoperative nausea and vomiting), Thyroid disease, Tuberculosis, or Uncomplicated asthma.      1. DM2, Blood glucose has risen above goal.  Discussed options, r/B.  Miller will increase Tresiba to 28 units daily with goal of fasting BG 90 -140 and no overnight hypoglycemia.    He also will increase Jardiance from 10 - to 20 mg daily when renewal comes up.  Reviewed need to hold this medication when risk of dehydration.    Recommend continue:   -  carbohydrate coverage to 1 unit : 15 g of carbohydrate.  Plans to do more consistently. Communicating with pharmacy to problem shoot lapses in Benjamin coverage as he gets out of Novolog sliding scale insulin habit end of each month and  hard to resume.  Rewriting rx for 9 sensors q 3 months.     -  Correction Novolounit Novolo mg /dl >150     - Empagliflozin 10 mg daily.  Exercise as able early in day.  Reccomend a mix of weight bearing and aerobic exercise to meet ADA recommendations.      Consider nutrition heart healthy diet, weight maintenance diet.      2. DM Complications-     Lifestyle modification focusing on application of a Mediterranean diet or Dietary Approaches to Stop Hypertension (DASH) dietary pattern; reduction of saturated fat and trans fat; increase of dietary n-3 fatty acids, viscous fiber, and plant stanols/sterols intake; and increased physical activity recommended to improve the lipid profile and reduce the risk of developing atherosclerotic cardiovascular disease.     Retinopathy:  Yes.  Seeing retina specialist.    Nephropathy:  No current elevated BP.  Creatinine stable. Consider ACe I / ARB  Neuropathy: No.    Feet: OK, no ulcers or lesions.   Lipids:   Patient taking a statin.    3. Night sweats:  He will get TSHR drawn, also I did order TB Gold quantiferon, but advised follow with PCP, GI.  If eval reassuring consider discuss trail Prazosin qhs with psychiatry.     He has chest abd CT scheduled .  Will have him see Dr. Stout following this if unrevealing to consider Metanephrines/5HIAA.      56 minutes in preparation for visit reviewing chart, labs and documentation, visiting with patient gathering history and in exam, education and counseling, as well as coordination of care, further chart review and documentation following visit on this date of service and as alluded to documented above.         It is my privilege to be involved in the care of the above patient.     Tish Toscano PA-C, MPAS  [facility]  Diabetes, Endocrinology, and Metabolism  [phone] Appointments/Nurse  [phone] pager  [phone] nurse line    This note was completed in part using Dragon voice  recognition, and may contain word and grammatical errors.        Outcome for 01/25/21 12:50 PM :Patient is sharing data via clinic device website and patient has been instructed to update data on website. Find login information by using .Eder Bhattuel OLIVER Workman  is being evaluated via a billable video visit.      How would you like to obtain your AVS? Mail a copy  For the video visit, send the invitation by: Front Row  Will anyone else be joining your video visit? No        Video-Visit Details    Type of service:  Video Visit    Video Start Time: 12:06 PM  Video End Time: 12:40 PM    Originating Location (pt. Location): Home    Distant Location (provider location):  [facility] [employer]     Platform used for Video Visit: [employer]

## 2021-01-26 ENCOUNTER — VIRTUAL VISIT (OUTPATIENT)
Dept: ENDOCRINOLOGY | Facility: CLINIC | Age: 56
End: 2021-01-26
Payer: MEDICARE

## 2021-01-26 DIAGNOSIS — R61 NIGHT SWEATS: ICD-10-CM

## 2021-01-26 DIAGNOSIS — R61 DIAPHORESIS: Primary | ICD-10-CM

## 2021-01-26 DIAGNOSIS — Z94.4 LIVER TRANSPLANT STATUS (H): ICD-10-CM

## 2021-01-26 DIAGNOSIS — E11.3293 TYPE 2 DIABETES MELLITUS WITH MILD NONPROLIFERATIVE RETINOPATHY OF BOTH EYES WITHOUT MACULAR EDEMA, UNSPECIFIED WHETHER LONG TERM INSULIN USE (H): ICD-10-CM

## 2021-01-26 PROCEDURE — 99215 OFFICE O/P EST HI 40 MIN: CPT | Mod: 95 | Performed by: PHYSICIAN ASSISTANT

## 2021-01-26 RX ORDER — FLASH GLUCOSE SENSOR
KIT MISCELLANEOUS
Qty: 9 EACH | Refills: 3 | Status: SHIPPED | OUTPATIENT
Start: 2021-01-26 | End: 2021-05-05

## 2021-01-26 NOTE — LETTER
2021       RE: Frandy Workman  [address] [facility] [address]     Dear Colleague,    Thank you for referring your patient, Frandy Workman, to the [facility]OCR[facility] [place] at [facility]. Please see a copy of my visit note below.          Diabetes Virtual Consult Note  2021      Frandy Workman is a 55 year old male with a past medical history including  has a past medical history of Anemia (), Arthritis, BPH (benign prostatic hyperplasia), CAD (coronary artery disease) (2019), Cholelithiasis, Conductive hearing loss (2017), Depressive disorder (), Gastroesophageal reflux disease (2014), HCC (hepatocellular carcinoma) (H) (2019), History of diabetic retinopathy (2018), HTN (hypertension), HTN (hypertension) (2019), Hyperlipidemia, Liver cirrhosis secondary to ESTRADA (H), Liver transplanted (H) (2019), Portal vein thrombosis (2019), and Type II diabetes mellitus (H). He also has no past medical history of Asymptomatic human immunodeficiency virus (HIV) infection status (H), Cerebral infarction (H), Congestive heart failure (H), COPD (chronic obstructive pulmonary disease) (H), History of blood transfusion, Infectious mononucleosis, PONV (postoperative nausea and vomiting), Thyroid disease, Tuberculosis, or Uncomplicated asthma. He also struggles with BPAD.      When I spoke to [facility] in late September he continued to have high BG  after having started Abilify over the summer and becoming more l sedentary, tired, lethargic, hungry, sleeping more since starting the medication with weight increasing to 172 lbs and average BG increased to 231 with high BG overnight.  We increased Tresiba to 27 units daily, carbohydrate coverage to 1 unit : 15 g of carbohydrate, continued 1unit Novolo mg /dl >150, and added  Empagliflozin 10 mg daily.  In late 2020 he was doing well with  this, but since has contacted me concerned about night sweats and I have asked him to check his BG over night; they were fine at that time.      I last saw Miller 20 and DM was well managed with avg  per Benjamin.    He is starting Carvedilol 3.125 bid for high blood pressure.      Recommended continue:      - Tresiba 27 units /day.   -  carbohydrate coverage to 1 unit : 15 g of carbohydrate.   -  Correction Novolounit Novolo mg /dl >150     - Empagliflozin 10 mg daily.  Add exercise early in day.  Reccomend a mix of weight bearing and aerobic exercise to meet ADA recommendations.       Consider nutrition heart healthy diet, weight maintenance diet.    Continue Seeing retina specialist, taking statin.  Consider ACe i ARB due to elevated microalbuminuria and CAD if tolerated (had lower BP).      Interim:    He is having a hard time getting Benjamin as runs out last week each month and can't renew.  He notes he gets out of Novolog sliding scale insulin habit end of each month and hard to resume - gets back to only another week or so after resumes Benjamin each month and sometimes forgets all together once habit gone.      Highest 157/ 96 with pulse 105 highest BP recorded in last month.      Night sweats - at times with heart racing but not last night.  Undershirt and sweats are wet.  Will awake in middle of night. Last night started at 3 am which is typical and eventually could fall back asleep around 4:30 am after changing clothes.  At times awakes feeling slightly frightened heart racing slightly as had a bad dream. Not usually.  Does have anxiety, that may not be optimally managed, but doubts any PTSD.    Has not been homeless known exposure to TB since last checked for prior to transplant.      Also up q 2-3 hours overnight.      Dm Regimen:   - Tresiba 27 units /day.   -  carbohydrate coverage to 1 unit : 15 g of carbohydrate.   -  Correction Novolounit Novolo mg /dl >180     - Empagliflozin  10 mg daily.      We reviewed glucometer, pump and CGMS data together.  It revealed:          His avg BG has increased from 162 and he is not scanning as regularly. No hypoglycemia recorded, overnight BG appears reassuring.  Night sweats do not appears related to BG which is rather stable overnight generally.      History of Diabetes monitoring and complications/ prevention:  CAD: yes 4/2019  Last eye exam results:8/20/20  - referred to retina specialists - considering injection.    Microalbuminuria: yes - has CKD, not currently on Ace or ARB, sees Cardiology , Neprhology  HTN: past  On statin: yes  On ASA:yes  Depression:yes  - sees psych  ED:did not inquire    Frandy's PMH reviewed with him.      Current Outpatient Medications   Medication     Acetaminophen (TYLENOL) 325 MG CAPS     ARIPiprazole (ABILIFY) 5 MG tablet     Artificial Tear Solution (SM ARTIFICIAL TEARS) SOLN     aspirin 81 MG EC tablet     BD VIKTORIA U/F 32G X 4 MM insulin pen needle     carvedilol (COREG) 3.125 MG tablet     ciclopirox (LOPROX) 0.77 % cream     Continuous Blood Gluc  (FREESTYLE CLAUDIA 14 DAY READER) CECILIO     Continuous Blood Gluc Sensor (FREESTYLE CLAUDIA 14 DAY SENSOR) MISC     empagliflozin (JARDIANCE) 10 MG TABS tablet     glucose (BD GLUCOSE) 4 g chewable tablet     insulin aspart (NOVOLOG PEN) 100 UNIT/ML pen     insulin degludec (TRESIBA FLEXTOUCH) 100 UNIT/ML pen     lamiVUDine (EPIVIR) 100 MG tablet     Multiple Vitamin (THERAVITE PO)     order for DME     pantoprazole (PROTONIX) 40 MG EC tablet     polyethylene glycol (MIRALAX) 17 g packet     rosuvastatin (CRESTOR) 5 MG tablet     tacrolimus (GENERIC EQUIVALENT) 1 MG capsule     tamsulosin (FLOMAX) 0.4 MG capsule     vitamin B-12 (CYANOCOBALAMIN) 1000 MCG tablet     vitamin D3 (CHOLECALCIFEROL) 2000 units (50 mcg) tablet     Current Facility-Administered Medications   Medication     Aflibercept (EYLEA) injection 2 mg              Frandy's family history includes Asthma  "in his sister; Cancer in his father, maternal grandmother, and mother; Cerebrovascular Disease in his mother; Colon Cancer (age of onset: 60) in his father; Colorectal Cancer in his father, maternal grandmother, and paternal grandmother; Depression in his mother and sister; Diabetes in his mother; Glaucoma in his father; Macular Degeneration in his father; Pancreatic Cancer (age of onset: 60) in his father; Prostate Cancer in his father and maternal grandfather; Skin Cancer in his father; Substance Abuse in his maternal grandfather and maternal grandmother; Thyroid Disease in his mother and sister.    ROS:   Patient denies any fevers, chills or sweats as well as any changes in or problems with vision, pain or problems with dentition, new or different headaches.  Patient denies symptoms of hypo and hyperglycemia except as above.   Patients denies marked fatigue, cough, shortness of breath, chest pain or pressure.  There has been no pain with or other changes in urination or  itching or pain in genital areas.  Patient denies any noted swelling in feet, ankles or otherwise, loss of sensation or pain  in feet or other areas.    Patient also denies current difficulties with depressed mood, anhedonia or worrying too much.        Exam:    PHONE VISIT        Wt Readings from Last 10 Encounters:   10/14/20 78 kg (171 lb 14.4 oz)   09/30/20 78.8 kg (173 lb 12.8 oz)   09/25/20 (P) 79 kg (174 lb 1.6 oz)   09/16/20 77.6 kg (171 lb)   08/19/20 76.9 kg (169 lb 8 oz)   08/12/20 76.8 kg (169 lb 4.8 oz)   07/29/20 75.6 kg (166 lb 9.6 oz)   07/28/20 75.8 kg (167 lb)   07/15/20 73.4 kg (161 lb 12.8 oz)   07/01/20 70.3 kg (155 lb)         Frandy is alerted and oriented.  Mood is \"good,\" affect is congruent.  Thoughtful form and content are fluid and coherent.  No signs of distress are appreciated.      Data:      Most recent:  Lab Results   Component Value Date    CR 1.95 10/14/2020     Lab Results   Component Value Date     " 10/14/2020      Anion Gap 3 - 14 mmol/L 4     Glucose 70 - 99 mg/dL 147High      Urea Nitrogen 7 - 30 mg/dL 39High      Creatinine 0.66 - 1.25 mg/dL 1.62High      GFR Estimate >60 mL/min/1.73_m2 46Low       On 1/20/21.      Lab Results   Component Value Date    A1C 6.0 (H) 12/30/2020    A1C 6.3 (H) 06/13/2020    A1C 6.6 (H) 08/08/2018    A1C 6.5 (H) 06/09/2017    A1C 7.8 (H) 10/25/2016    HEMOGLOBINA1 4.8 03/04/2020    HEMOGLOBINA1 5.1 12/02/2019    HEMOGLOBINA1 5.4 08/14/2019    HEMOGLOBINA1 6.1 (A) 02/11/2019    HEMOGLOBINA1 8.1 (A) 04/11/2018     Lab Results   Component Value Date    MICROL 196 06/29/2020     No results found for: MICROALBUMIN  No results found for: CPEPT, GADAB, ISCAB  Cholesterol   Date Value Ref Range Status   09/25/2020 146 <200 mg/dL Final   07/29/2020 192 <200 mg/dL Final     HDL Cholesterol   Date Value Ref Range Status   09/25/2020 39 (L) >39 mg/dL Final   07/29/2020 39 (L) >39 mg/dL Final     LDL Cholesterol Calculated   Date Value Ref Range Status   09/25/2020 61 <100 mg/dL Final     Comment:     Desirable:       <100 mg/dl   07/29/2020 117 (H) <100 mg/dL Final     Comment:     Above desirable:  100-129 mg/dl  Borderline High:  130-159 mg/dL  High:             160-189 mg/dL  Very high:       >189 mg/dl       Triglycerides   Date Value Ref Range Status   09/25/2020 228 (H) <150 mg/dL Final     Comment:     Borderline high:  150-199 mg/dl  High:             200-499 mg/dl  Very high:       >499 mg/dl     07/29/2020 181 (H) <150 mg/dL Final     Comment:     Borderline high:  150-199 mg/dl  High:             200-499 mg/dl  Very high:       >499 mg/dl       No results found for: CHOLHDLRATIO      GFR Estimate   Date Value Ref Range Status   01/20/2021 46 (L) >60 mL/min/[1.73_m2] Final     Comment:     Non  GFR Calc  Starting 12/18/2018, serum creatinine based estimated GFR (eGFR) will be   calculated using the Chronic Kidney Disease Epidemiology Collaboration   (CKD-EPI)  equation.     12/30/2020 41 (L) >60 mL/min/[1.73_m2] Final     Comment:     Non  GFR Calc  Starting 12/18/2018, serum creatinine based estimated GFR (eGFR) will be   calculated using the Chronic Kidney Disease Epidemiology Collaboration   (CKD-EPI) equation.     12/02/2020 49 (L) >60 mL/min/[1.73_m2] Final     Comment:     Non  GFR Calc  Starting 12/18/2018, serum creatinine based estimated GFR (eGFR) will be   calculated using the Chronic Kidney Disease Epidemiology Collaboration   (CKD-EPI) equation.         TSH ordered in November not yet drawn.      CBC BMP Hep panel from 1/20 stable, reassuring.  Unrevealing.            Assessment/Plan:    Frandy is a 55 year old male with  has a past medical history of Anemia (2013), Arthritis, BPH (benign prostatic hyperplasia), CAD (coronary artery disease) (4/1/2019), Cholelithiasis, Conductive hearing loss (08/16/2017), Depressive disorder (1986), Gastroesophageal reflux disease (12/01/2014), HCC (hepatocellular carcinoma) (H) (1/22/2019), History of diabetic retinopathy (07/2018), HTN (hypertension), HTN (hypertension) (11/20/2019), Hyperlipidemia, Liver cirrhosis secondary to ESTRADA (H), Liver transplanted (H) (11/11/2019), Portal vein thrombosis (4/11/2019), and Type II diabetes mellitus (H). He also has no past medical history of Asymptomatic human immunodeficiency virus (HIV) infection status (H), Cerebral infarction (H), Congestive heart failure (H), COPD (chronic obstructive pulmonary disease) (H), History of blood transfusion, Infectious mononucleosis, PONV (postoperative nausea and vomiting), Thyroid disease, Tuberculosis, or Uncomplicated asthma.      1. DM2, Blood glucose has risen above goal.  Discussed options, r/B.  Miller will increase Tresiba to 28 units daily with goal of fasting BG 90 -140 and no overnight hypoglycemia.    He also will increase Jardiance from 10 - to 20 mg daily when renewal comes up.  Reviewed need to hold this  medication when risk of dehydration.    Recommend continue:   -  carbohydrate coverage to 1 unit : 15 g of carbohydrate.  Plans to do more consistently. Communicating with pharmacy to problem shoot lapses in Benjamin coverage as he gets out of Novolog sliding scale insulin habit end of each month and hard to resume.  Rewriting rx for 9 sensors q 3 months.     -  Correction Novolounit Novolo mg /dl >150     - Empagliflozin 10 mg daily.  Exercise as able early in day.  Reccomend a mix of weight bearing and aerobic exercise to meet ADA recommendations.      Consider nutrition heart healthy diet, weight maintenance diet.      2. DM Complications-     Lifestyle modification focusing on application of a Mediterranean diet or Dietary Approaches to Stop Hypertension (DASH) dietary pattern; reduction of saturated fat and trans fat; increase of dietary n-3 fatty acids, viscous fiber, and plant stanols/sterols intake; and increased physical activity recommended to improve the lipid profile and reduce the risk of developing atherosclerotic cardiovascular disease.     Retinopathy:  Yes.  Seeing retina specialist.    Nephropathy:  No current elevated BP.  Creatinine stable. Consider ACe I / ARB  Neuropathy: No.    Feet: OK, no ulcers or lesions.   Lipids:   Patient taking a statin.    3. Night sweats:  He will get TSHR drawn, also I did order TB Gold quantiferon, but advised follow with PCP, GI.  If eval reassuring consider discuss trail Prazosin qhs with psychiatry.     56 minutes in preparation for visit reviewing chart, labs and documentation, visiting with patient gathering history and in exam, education and counseling, as well as coordination of care, further chart review and documentation following visit on this date of service and as alluded to documented above.         It is my privilege to be involved in the care of the above patient.     Tish Toscano PA-C, MPAS  [facility]  Diabetes,  Endocrinology, and Metabolism  [phone] Appointments/Nurse  [phone] pager  [phone] nurse line    This note was completed in part using Dragon voice recognition, and may contain word and grammatical errors.        Outcome for 01/25/21 12:50 PM :Patient is sharing data via clinic device website and patient has been instructed to update data on website. Find login information by using .[facility] camilo Workman  is being evaluated via a billable video visit.      How would you like to obtain your AVS? Mail a copy  For the video visit, send the invitation by: [employer]  Will anyone else be joining your video visit? No        Video-Visit Details    Type of service:  Video Visit    Video Start Time: 12:06 PM  Video End Time: 12:40 PM    Originating Location (pt. Location): Home    Distant Location (provider location):  [facility] ENDOCRINOLOGY [facility]IN[facility] [place]     Platform used for Video Visit: [employer]

## 2021-01-28 ENCOUNTER — OFFICE VISIT (OUTPATIENT)
Dept: ENDOCRINOLOGY | Facility: CLINIC | Age: 56
End: 2021-01-28
Payer: MEDICARE

## 2021-01-28 DIAGNOSIS — E11.49 TYPE II OR UNSPECIFIED TYPE DIABETES MELLITUS WITH NEUROLOGICAL MANIFESTATIONS, NOT STATED AS UNCONTROLLED(250.60) (H): Primary | ICD-10-CM

## 2021-01-28 DIAGNOSIS — E11.3293 TYPE 2 DIABETES MELLITUS WITH MILD NONPROLIFERATIVE RETINOPATHY OF BOTH EYES WITHOUT MACULAR EDEMA, UNSPECIFIED WHETHER LONG TERM INSULIN USE (H): ICD-10-CM

## 2021-01-28 DIAGNOSIS — Z94.4 LIVER TRANSPLANT STATUS (H): ICD-10-CM

## 2021-01-28 DIAGNOSIS — N18.31 TYPE 2 DIABETES MELLITUS WITH STAGE 3A CHRONIC KIDNEY DISEASE, WITH LONG-TERM CURRENT USE OF INSULIN (H): ICD-10-CM

## 2021-01-28 DIAGNOSIS — R61 DIAPHORESIS: ICD-10-CM

## 2021-01-28 DIAGNOSIS — L60.2 ONYCHAUXIS: ICD-10-CM

## 2021-01-28 DIAGNOSIS — Z79.4 TYPE 2 DIABETES MELLITUS WITH STAGE 3A CHRONIC KIDNEY DISEASE, WITH LONG-TERM CURRENT USE OF INSULIN (H): ICD-10-CM

## 2021-01-28 DIAGNOSIS — E11.22 TYPE 2 DIABETES MELLITUS WITH STAGE 3A CHRONIC KIDNEY DISEASE, WITH LONG-TERM CURRENT USE OF INSULIN (H): ICD-10-CM

## 2021-01-28 LAB — TSH SERPL DL<=0.005 MIU/L-ACNC: 0.91 MU/L (ref 0.4–4)

## 2021-01-28 PROCEDURE — 99214 OFFICE O/P EST MOD 30 MIN: CPT | Mod: 25 | Performed by: PODIATRIST

## 2021-01-28 PROCEDURE — 86481 TB AG RESPONSE T-CELL SUSP: CPT | Performed by: PATHOLOGY

## 2021-01-28 PROCEDURE — 36415 COLL VENOUS BLD VENIPUNCTURE: CPT | Performed by: PATHOLOGY

## 2021-01-28 PROCEDURE — 84443 ASSAY THYROID STIM HORMONE: CPT | Performed by: PATHOLOGY

## 2021-01-28 PROCEDURE — 11719 TRIM NAIL(S) ANY NUMBER: CPT | Performed by: PODIATRIST

## 2021-01-28 NOTE — LETTER
1/28/2021       RE: Frandy Workman  [address] [place] [address]     Dear Colleague,    Thank you for referring your patient, Frandy Workman, to the [facility] [place] [facility]. Please see a copy of my visit note below.    Past Medical History:   Diagnosis Date     Anemia 2013     Arthritis      BPH (benign prostatic hyperplasia)      CAD (coronary artery disease) 4/1/2019     Cholelithiasis      Conductive hearing loss 08/16/2017     Depressive disorder 1986    Suffer effects throughout life     Gastroesophageal reflux disease 12/01/2014     HCC (hepatocellular carcinoma) (H) 1/22/2019     History of diabetic retinopathy 07/2018     HTN (hypertension)      HTN (hypertension) 11/20/2019     Hyperlipidemia      Liver cirrhosis secondary to ESTRADA (H)      Liver transplanted (H) 11/11/2019     Portal vein thrombosis 4/11/2019     Type II diabetes mellitus (H)      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, unspecified whether long term insulin use (H)     Status post coronary angiogram     CAD (coronary artery disease)     Liver transplant recipient (H)     Status post liver transplantation (H)     Immunosuppressed status (H)     HTN (hypertension)     Malnutrition related to chronic disease (H)     Hypophosphatasia     CKD (chronic kidney disease), stage III     Malnutrition (H)     Anemia     Anxiety     Mild recurrent major depression (H)     Low hemoglobin     CKD (chronic kidney disease) stage 4, GFR 15-29 ml/min (H)     Anemia of chronic renal  failure, stage 4 (severe) (H)     Rekha (H)     Past Surgical History:   Procedure Laterality Date     COLONOSCOPY      2015     COLONOSCOPY N/A 12/6/2019    Procedure: COLONOSCOPY, WITH POLYPECTOMY AND BIOPSY;  Surgeon: Adam Morton MD;  Location:  GI     CV HEART CATHETERIZATION WITH POSSIBLE INTERVENTION N/A 2/26/2019    Procedure: CORS;  Surgeon: Jagdish Hoyt MD;  Location:  HEART CARDIAC CATH LAB     ESOPHAGOSCOPY, GASTROSCOPY, DUODENOSCOPY (EGD), COMBINED N/A 11/17/2016    Procedure: COMBINED ESOPHAGOSCOPY, GASTROSCOPY, DUODENOSCOPY (EGD);  Surgeon: Santi Rosas MD;  Location:  GI     ESOPHAGOSCOPY, GASTROSCOPY, DUODENOSCOPY (EGD), COMBINED N/A 11/17/2017    Procedure: COMBINED ESOPHAGOSCOPY, GASTROSCOPY, DUODENOSCOPY (EGD);  EGD;  Surgeon: Santi Rosas MD;  Location:  GI     ESOPHAGOSCOPY, GASTROSCOPY, DUODENOSCOPY (EGD), COMBINED N/A 12/28/2018    Procedure: EGD;  Surgeon: Santi Rosas MD;  Location:  OR     ESOPHAGOSCOPY, GASTROSCOPY, DUODENOSCOPY (EGD), COMBINED N/A 12/6/2019    Procedure: ESOPHAGOGASTRODUODENOSCOPY, WITH BIOPSY;  Surgeon: Adam Morton MD;  Location:  GI     ESOPHAGOSCOPY, GASTROSCOPY, DUODENOSCOPY (EGD), COMBINED N/A 2/13/2020    Procedure: ESOPHAGOGASTRODUODENOSCOPY (EGD);  Surgeon: Santi Rosas MD;  Location:  GI     HEAD & NECK SURGERY      12/2017 at [facility].      IMPLANT GOLD WEIGHT EYELID Right 11/16/2017    Procedure: IMPLANT WEIGHT EYELID;  Right Upper Eyelid Weight, right tarsal strip lower eyelid;  Surgeon: Milana Malave MD;  Location: [facility] OR     IR CHEMO EMBOLIZATION  1/22/2019     KNEE SURGERY Left      ORTHOPEDIC SURGERY       PAROTIDECTOMY, RADICAL NECK DISSECTION Right 11/2/2017    Procedure: PAROTIDECTOMY, RADICAL NECK DISSECTION;  Right Superfacial Parotidectomy , Facial nerve repair. with [facility] facial nerve monitor.;  Surgeon: Asiya Morgan MD;  Location: [facility] OR     PICC INSERTION Left  2017    4fr SL BioFlo PICC, 44cm in the L basilic vein w/ tip in the low SVC     RETURN LIVER TRANSPLANT N/A 2019    Procedure: Exploratory laparotomy, hematoma evacuation, abdominal washout;  Surgeon: Александр Ramos MD;  Location: [facility] OR     TRANSPLANT LIVER RECIPIENT  DONOR N/A 2019    Procedure: TRANSPLANT, LIVER, RECIPIENT,  DONOR;  Surgeon: Александр Ramos MD;  Location: [facility] OR     VASCULAR SURGERY       Social History     Socioeconomic History     Marital status:      Spouse name: Not on file     Number of children: Not on file     Years of education: Not on file     Highest education level: Bachelor's degree (e.g., BA, AB, BS)   Occupational History     Not on file   Social Needs     Financial resource strain: Not very hard     Food insecurity     Worry: Never true     Inability: Never true     Transportation needs     Medical: No     Non-medical: No   Tobacco Use     Smoking status: Former Smoker     Packs/day: 6.00     Years: 30.00     Pack years: 180.00     Types: Cigars     Start date: 2016     Quit date: 10/25/2017     Years since quitting: 3.2     Smokeless tobacco: Former User     Types: Chew     Quit date: 10/31/2017     Tobacco comment: 1 tin per week   Substance and Sexual Activity     Alcohol use: No     Alcohol/week: 0.0 standard drinks     Frequency: Never     Drinks per session: Patient refused     Binge frequency: Never     Comment: quit 1996     Drug use: No     Sexual activity: Not Currently     Partners: Female     Birth control/protection: Condom   Lifestyle     Physical activity     Days per week: 1 day     Minutes per session: 60 min     Stress: To some extent   Relationships     Social connections     Talks on phone: Once a week     Gets together: Once a week     Attends [employer] service: Never     Active member of club or organization: No     Attends meetings of clubs or organizations: Never     Relationship status:       Intimate partner violence     Fear of current or ex partner: Not on file     Emotionally abused: Not on file     Physically abused: Not on file     Forced sexual activity: Not on file   Other Topics Concern     Parent/sibling w/ CABG, MI or angioplasty before 65F 55M? Yes   Social History Narrative    Prior , [place]     Family History   Problem Relation Age of Onset     Prostate Cancer Maternal Grandfather      Substance Abuse Maternal Grandfather         Alcohol     Colon Cancer Father 60     Pancreatic Cancer Father 60     Prostate Cancer Father      Colorectal Cancer Father      Macular Degeneration Father      Cancer Father      Glaucoma Father      Skin Cancer Father      Colorectal Cancer Maternal Grandmother      Cancer Maternal Grandmother      Substance Abuse Maternal Grandmother         Alcohol     Colorectal Cancer Paternal Grandmother      Cancer Mother      Diabetes Mother          3/2016     Cerebrovascular Disease Mother         Passed away in Feb of this year, 80 years old.     Thyroid Disease Mother      Depression Mother      Asthma Sister         Had since birth     Thyroid Disease Sister      Depression Sister      Liver Disease No family hx of      Melanoma No family hx of      Lab Results   Component Value Date    A1C 6.0 2020    A1C 6.3 2020    A1C 6.6 2018    A1C 6.5 2017    A1C 7.8 10/25/2016     Lab Results   Component Value Date    WBC 5.9 2021     Lab Results   Component Value Date    RBC 4.05 2021     Lab Results   Component Value Date    HGB 13.3 2021     Lab Results   Component Value Date    HCT 39.0 2021     No components found for: MCT  Lab Results   Component Value Date    MCV 96 2021     Lab Results   Component Value Date    MCH 32.8 2021     Lab Results   Component Value Date    MCHC 34.1 2021     Lab Results   Component Value Date    RDW 13.5 2021     Lab Results   Component Value Date      01/20/2021     Last Comprehensive Metabolic Panel:  Sodium   Date Value Ref Range Status   01/20/2021 138 133 - 144 mmol/L Final     Potassium   Date Value Ref Range Status   01/20/2021 4.9 3.4 - 5.3 mmol/L Final     Chloride   Date Value Ref Range Status   01/20/2021 107 94 - 109 mmol/L Final     Carbon Dioxide   Date Value Ref Range Status   01/20/2021 27 20 - 32 mmol/L Final     Anion Gap   Date Value Ref Range Status   01/20/2021 4 3 - 14 mmol/L Final     Glucose   Date Value Ref Range Status   01/20/2021 147 (H) 70 - 99 mg/dL Final     Urea Nitrogen   Date Value Ref Range Status   01/20/2021 39 (H) 7 - 30 mg/dL Final     Creatinine   Date Value Ref Range Status   01/20/2021 1.62 (H) 0.66 - 1.25 mg/dL Final     GFR Estimate   Date Value Ref Range Status   01/20/2021 46 (L) >60 mL/min/[1.73_m2] Final     Comment:     Non  GFR Calc  Starting 12/18/2018, serum creatinine based estimated GFR (eGFR) will be   calculated using the Chronic Kidney Disease Epidemiology Collaboration   (CKD-EPI) equation.       Calcium   Date Value Ref Range Status   01/20/2021 9.7 8.5 - 10.1 mg/dL Final     Lab Results   Component Value Date    AST 17 01/20/2021     Lab Results   Component Value Date    ALT 33 01/20/2021     No results found for: BILICONJ   Lab Results   Component Value Date    BILITOTAL 0.6 01/20/2021     Lab Results   Component Value Date    ALBUMIN 4.2 01/20/2021     Lab Results   Component Value Date    PROTTOTAL 8.4 01/20/2021      Lab Results   Component Value Date    ALKPHOS 140 01/20/2021     SUBJECTIVE FINDINGS:  A 55-year-old male returns to clinic for foot check.  He relates he is doing okay.  Relates he has numbness, tingling and neuropathy that he feels has worsened.  Relates no ulcers or sores since we have seen him last.  Relates he needs his toenails trimmed.  No other specific relieving or aggravating factors.  He does have diabetic shoes that he wears.       OBJECTIVE FINDINGS:   DP and PT 2/4 bilaterally.  He has dorsally contracted digits 2-5 bilaterally.  He has incurvated nails with some thickening and dystrophy 1-5 bilaterally to differing degrees.  There is no erythema, no drainage, no odor, no calor bilaterally.  Sharp/dull is intact with 5.07 [place] monofilament bilaterally.  Deep tendon reflexes are intact bilaterally.  There are no gross tendon voids bilaterally.  He has a laterally deviated hallux bilaterally.       ASSESSMENT/PLAN:  Onychauxis bilaterally.  He is diabetic with peripheral neuropathy.  His protective sensation is intact.  Diagnosis and treatment options discussed with him.  All the nails were reduced bilaterally upon consent.  He will return to clinic and see me in about 3 months.         Again, thank you for allowing me to participate in the care of your patient.      Sincerely,    Lamin Gonzalez DPM

## 2021-01-28 NOTE — PROGRESS NOTES
Past Medical History:   Diagnosis Date     Anemia 2013     Arthritis      BPH (benign prostatic hyperplasia)      CAD (coronary artery disease) 4/1/2019     Cholelithiasis      Conductive hearing loss 08/16/2017     Depressive disorder 1986    Suffer effects throughout life     Gastroesophageal reflux disease 12/01/2014     HCC (hepatocellular carcinoma) (H) 1/22/2019     History of diabetic retinopathy 07/2018     HTN (hypertension)      HTN (hypertension) 11/20/2019     Hyperlipidemia      Liver cirrhosis secondary to ESTRADA (H)      Liver transplanted (H) 11/11/2019     Portal vein thrombosis 4/11/2019     Type II diabetes mellitus (H)      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, unspecified whether long term insulin use (H)     Status post coronary angiogram     CAD (coronary artery disease)     Liver transplant recipient (H)     Status post liver transplantation (H)     Immunosuppressed status (H)     HTN (hypertension)     Malnutrition related to chronic disease (H)     Hypophosphatasia     CKD (chronic kidney disease), stage III     Malnutrition (H)     Anemia     Anxiety     Mild recurrent major depression (H)     Low hemoglobin     CKD (chronic kidney disease) stage 4, GFR 15-29 ml/min (H)     Anemia of chronic renal failure, stage 4 (severe) (H)     Rekha (H)     Past Surgical History:   Procedure Laterality Date     COLONOSCOPY      2015     COLONOSCOPY N/A 12/6/2019    Procedure: COLONOSCOPY, WITH POLYPECTOMY AND BIOPSY;  Surgeon: Adam Morton MD;  Location: [facility] GI     CV HEART CATHETERIZATION WITH POSSIBLE INTERVENTION  N/A 2019    Procedure: CORS;  Surgeon: Jagdish Hoyt MD;  Location:  HEART CARDIAC CATH LAB     ESOPHAGOSCOPY, GASTROSCOPY, DUODENOSCOPY (EGD), COMBINED N/A 2016    Procedure: COMBINED ESOPHAGOSCOPY, GASTROSCOPY, DUODENOSCOPY (EGD);  Surgeon: Santi Rosas MD;  Location:  GI     ESOPHAGOSCOPY, GASTROSCOPY, DUODENOSCOPY (EGD), COMBINED N/A 2017    Procedure: COMBINED ESOPHAGOSCOPY, GASTROSCOPY, DUODENOSCOPY (EGD);  EGD;  Surgeon: Santi Rosas MD;  Location:  GI     ESOPHAGOSCOPY, GASTROSCOPY, DUODENOSCOPY (EGD), COMBINED N/A 2018    Procedure: EGD;  Surgeon: Santi Rosas MD;  Location:  OR     ESOPHAGOSCOPY, GASTROSCOPY, DUODENOSCOPY (EGD), COMBINED N/A 2019    Procedure: ESOPHAGOGASTRODUODENOSCOPY, WITH BIOPSY;  Surgeon: Adam Morton MD;  Location:  GI     ESOPHAGOSCOPY, GASTROSCOPY, DUODENOSCOPY (EGD), COMBINED N/A 2020    Procedure: ESOPHAGOGASTRODUODENOSCOPY (EGD);  Surgeon: Santi Rosas MD;  Location:  GI     HEAD & NECK SURGERY      2017 at [facility].      IMPLANT GOLD WEIGHT EYELID Right 2017    Procedure: IMPLANT WEIGHT EYELID;  Right Upper Eyelid Weight, right tarsal strip lower eyelid;  Surgeon: Milana Malave MD;  Location: [facility] OR     IR CHEMO EMBOLIZATION  2019     KNEE SURGERY Left      ORTHOPEDIC SURGERY       PAROTIDECTOMY, RADICAL NECK DISSECTION Right 2017    Procedure: PAROTIDECTOMY, RADICAL NECK DISSECTION;  Right Superfacial Parotidectomy , Facial nerve repair. with [facility] facial nerve monitor.;  Surgeon: Asiya Morgan MD;  Location: [facility] OR     PICC INSERTION Left 2017    4fr SL BioFlo PICC, 44cm in the L basilic vein w/ tip in the low SVC     RETURN LIVER TRANSPLANT N/A 2019    Procedure: Exploratory laparotomy, hematoma evacuation, abdominal washout;  Surgeon: Александр Ramos MD;  Location: [facility] OR     TRANSPLANT LIVER RECIPIENT  DONOR N/A 2019     Procedure: TRANSPLANT, LIVER, RECIPIENT,  DONOR;  Surgeon: Александр Ramos MD;  Location: [facility] OR     VASCULAR SURGERY       Social History     Socioeconomic History     Marital status:      Spouse name: Not on file     Number of children: Not on file     Years of education: Not on file     Highest education level: Bachelor's degree (e.g., BA, AB, BS)   Occupational History     Not on file   Social Needs     Financial resource strain: Not very hard     Food insecurity     Worry: Never true     Inability: Never true     Transportation needs     Medical: No     Non-medical: No   Tobacco Use     Smoking status: Former Smoker     Packs/day: 6.00     Years: 30.00     Pack years: 180.00     Types: Cigars     Start date: 2016     Quit date: 10/25/2017     Years since quitting: 3.2     Smokeless tobacco: Former User     Types: Chew     Quit date: 10/31/2017     Tobacco comment: 1 tin per week   Substance and Sexual Activity     Alcohol use: No     Alcohol/week: 0.0 standard drinks     Frequency: Never     Drinks per session: Patient refused     Binge frequency: Never     Comment: quit 1996     Drug use: No     Sexual activity: Not Currently     Partners: Female     Birth control/protection: Condom   Lifestyle     Physical activity     Days per week: 1 day     Minutes per session: 60 min     Stress: To some extent   Relationships     Social connections     Talks on phone: Once a week     Gets together: Once a week     Attends Caodaism service: Never     Active member of club or organization: No     Attends meetings of clubs or organizations: Never     Relationship status:      Intimate partner violence     Fear of current or ex partner: Not on file     Emotionally abused: Not on file     Physically abused: Not on file     Forced sexual activity: Not on file   Other Topics Concern     Parent/sibling w/ CABG, MI or angioplasty before 65F 55M? Yes   Social History Narrative    Prior ,  [employer] [place]     Family History   Problem Relation Age of Onset     Prostate Cancer Maternal Grandfather      Substance Abuse Maternal Grandfather         Alcohol     Colon Cancer Father 60     Pancreatic Cancer Father 60     Prostate Cancer Father      Colorectal Cancer Father      Macular Degeneration Father      Cancer Father      Glaucoma Father      Skin Cancer Father      Colorectal Cancer Maternal Grandmother      Cancer Maternal Grandmother      Substance Abuse Maternal Grandmother         Alcohol     Colorectal Cancer Paternal Grandmother      Cancer Mother      Diabetes Mother          3/2016     Cerebrovascular Disease Mother         Passed away in Feb of this year, 80 years old.     Thyroid Disease Mother      Depression Mother      Asthma Sister         Had since birth     Thyroid Disease Sister      Depression Sister      Liver Disease No family hx of      Melanoma No family hx of      Lab Results   Component Value Date    A1C 6.0 2020    A1C 6.3 2020    A1C 6.6 2018    A1C 6.5 2017    A1C 7.8 10/25/2016     Lab Results   Component Value Date    WBC 5.9 2021     Lab Results   Component Value Date    RBC 4.05 2021     Lab Results   Component Value Date    HGB 13.3 2021     Lab Results   Component Value Date    HCT 39.0 2021     No components found for: MCT  Lab Results   Component Value Date    MCV 96 2021     Lab Results   Component Value Date    MCH 32.8 2021     Lab Results   Component Value Date    MCHC 34.1 2021     Lab Results   Component Value Date    RDW 13.5 2021     Lab Results   Component Value Date     2021     Last Comprehensive Metabolic Panel:  Sodium   Date Value Ref Range Status   2021 138 133 - 144 mmol/L Final     Potassium   Date Value Ref Range Status   2021 4.9 3.4 - 5.3 mmol/L Final     Chloride   Date Value Ref Range Status   2021 107 94 - 109 mmol/L Final     Carbon  Dioxide   Date Value Ref Range Status   01/20/2021 27 20 - 32 mmol/L Final     Anion Gap   Date Value Ref Range Status   01/20/2021 4 3 - 14 mmol/L Final     Glucose   Date Value Ref Range Status   01/20/2021 147 (H) 70 - 99 mg/dL Final     Urea Nitrogen   Date Value Ref Range Status   01/20/2021 39 (H) 7 - 30 mg/dL Final     Creatinine   Date Value Ref Range Status   01/20/2021 1.62 (H) 0.66 - 1.25 mg/dL Final     GFR Estimate   Date Value Ref Range Status   01/20/2021 46 (L) >60 mL/min/[1.73_m2] Final     Comment:     Non  GFR Calc  Starting 12/18/2018, serum creatinine based estimated GFR (eGFR) will be   calculated using the Chronic Kidney Disease Epidemiology Collaboration   (CKD-EPI) equation.       Calcium   Date Value Ref Range Status   01/20/2021 9.7 8.5 - 10.1 mg/dL Final     Lab Results   Component Value Date    AST 17 01/20/2021     Lab Results   Component Value Date    ALT 33 01/20/2021     No results found for: BILICONJ   Lab Results   Component Value Date    BILITOTAL 0.6 01/20/2021     Lab Results   Component Value Date    ALBUMIN 4.2 01/20/2021     Lab Results   Component Value Date    PROTTOTAL 8.4 01/20/2021      Lab Results   Component Value Date    ALKPHOS 140 01/20/2021     SUBJECTIVE FINDINGS:  A 55-year-old male returns to clinic for foot check.  He relates he is doing okay.  Relates he has numbness, tingling and neuropathy that he feels has worsened.  Relates no ulcers or sores since we have seen him last.  Relates he needs his toenails trimmed.  No other specific relieving or aggravating factors.  He does have diabetic shoes that he wears.       OBJECTIVE FINDINGS:  DP and PT 2/4 bilaterally.  He has dorsally contracted digits 2-5 bilaterally.  He has incurvated nails with some thickening and dystrophy 1-5 bilaterally to differing degrees.  There is no erythema, no drainage, no odor, no calor bilaterally.  Sharp/dull is intact with 5.07 [place] monofilament  bilaterally.  Deep tendon reflexes are intact bilaterally.  There are no gross tendon voids bilaterally.  He has a laterally deviated hallux bilaterally.       ASSESSMENT/PLAN:  Onychauxis bilaterally.  He is diabetic with peripheral neuropathy.  His protective sensation is intact.  Diagnosis and treatment options discussed with him.  All the nails were reduced bilaterally upon consent.  He will return to clinic and see me in about 3 months.

## 2021-01-29 LAB
GAMMA INTERFERON BACKGROUND BLD IA-ACNC: 0.03 IU/ML
M TB IFN-G CD4+ BCKGRND COR BLD-ACNC: 9.97 IU/ML
M TB TUBERC IFN-G BLD QL: NEGATIVE
MITOGEN IGNF BCKGRD COR BLD-ACNC: 0 IU/ML
MITOGEN IGNF BCKGRD COR BLD-ACNC: 0.01 IU/ML

## 2021-02-03 ENCOUNTER — VIRTUAL VISIT (OUTPATIENT)
Dept: BEHAVIORAL HEALTH | Facility: CLINIC | Age: 56
End: 2021-02-03
Payer: MEDICARE

## 2021-02-03 DIAGNOSIS — F31.31 BIPOLAR 1 DISORDER, DEPRESSED, MILD (H): Primary | ICD-10-CM

## 2021-02-03 PROCEDURE — 90834 PSYTX W PT 45 MINUTES: CPT | Mod: 95 | Performed by: PSYCHOLOGIST

## 2021-02-03 NOTE — PROGRESS NOTES
[facility]: Integrated Behavioral Health  February 3, 2021      Behavioral Health Clinician Progress Note    Patient Name: Frandy Workman           Service Type: Video visit      Service Location:  Video visit      Session Start Time: 2:03  Session End Time: 2:50      Session Length: 38 - 52      Attendees: Patient    Visit Activities (Refresh list every visit): [facility] [place] Only     Telemedicine Visit: The patient's condition can be safely assessed and treated via synchronous audio and visual telemedicine encounter.      Reason for Telemedicine Visit: COVID-19    Originating Site (Patient Location): Patient's home    Distant Site (Provider Location): Provider Remote Setting    Consent:  The patient/guardian has verbally consented to: the potential risks and benefits of telemedicine (video visit) versus in person care; bill my insurance or make self-payment for services provided; and responsibility for payment of non-covered services.     Mode of Communication:  Video Conference via [facility]    As the provider I attest to compliance with applicable laws and regulations related to telemedicine.    Diagnostic Assessment Date: 12/03/2020  Treatment Plan Review Date:  3/29/2021  See Flowsheets for today's PHQ-9 and LIZZETTE-7 results  Previous PHQ-9:   PHQ-9 SCORE 1/9/2020 10/13/2020 12/29/2020   PHQ-9 Total Score MyChart - 8 (Mild depression) 5 (Mild depression)   PHQ-9 Total Score 16 8 5     Previous LIZZETTE-7:   LIZZETTE-7 SCORE 10/13/2020 12/29/2020   Total Score 6 (mild anxiety) 8 (mild anxiety)   Total Score 6 8       HEATH LEVEL:  No flowsheet data found.    DATA  Extended Session (60+ minutes): No  Interactive Complexity: No  Crisis: No    Treatment Objective(s) Addressed in This Session:  Target Behavior(s): disease management/lifestyle changes related to depressive and anxiety symptoms    Depression and anxious distress management.    Current Stressors / Issues:  Miller presented today with  "concerns about his claim to renew his disability benefits. He reportedly began pursuing renewal in November, however has grown concerned about his benefits running out in March and no longer having income and medical coverage. While he said that he has done all he could in submitting paperwork, he is feeling helpless and \"lost\" with the process. We talked about this in depth today, why he feels like \"giving up.\" Miller stated that because of the possible negative outcomes of his claim being denied, he is more anxious and down. He is particularly worried about having to return to work, as this is what contributed to his health decline and he is not feeling ready for that.     To note, when asked about \"giving up\" Miller denied suicidal ideation in this context.     We explored catastrophic thought patterns he may experience, looking at why he is quick to imagine the worst-case scenario based on his past negative experiences with his health and relationships. We explored ways to modify this thought pattern, such as thinking of the best case, worst case, and most realistic scenarios with regard to his disability claim. We also talked about implementing worry-time into his day. We used the metaphor of a \"controlled fire\" to discuss how scheduled/structured worry time can be helpful for managing distress. The notions of delayed worry for help managing distress was also used.     Toward the end of the session, we revisited the dreams Miller is having about his daughter. While he finds them initially enjoyable/non-distressing, they end in an unpleasant manner, like she is being taken away again. We explored possible connections/themes between his disability claim and these dreams and explored how helplessness might be a salient theme for him. We agreed to continue looking into this during our next session.     Miller did also raise a few questions about benefits, medicare, and insurance that I could not reasonably answer, so I offered " [facility] see if she would reach out to him to discuss these. Miller agreed to this.       Progress on Treatment Objective(s) / Homework:  Satisfactory progress - ACTION (Actively working towards change); Intervened by reinforcing change plan / affirming steps taken    Answers for HPI/ROS submitted by the patient on 10/13/2020   If you checked off any problems, how difficult have these problems made it for you to do your work, take care of things at home, or get along with other people?: Somewhat difficult  PHQ9 TOTAL SCORE: 8*  LIZZETTE 7 TOTAL SCORE: 6    *No SI     Answers for HPI/ROS submitted by the patient on 12/29/2020   If you checked off any problems, how difficult have these problems made it for you to do your work, take care of things at home, or get along with other people?: Somewhat difficult  PHQ9 TOTAL SCORE: 5*  LIZZETTE 7 TOTAL SCORE: 8    *No SI     Supportive counseling used    Insight focused counseling used     CBT for negative thoughts used    Care Plan review completed: not today    Medication Review:  No changes to current psychiatric medication(s)     Medication Compliance:  Yes    Changes in Health Issues:   None reported    Chemical Use Review:   Substance Use: Chemical use reviewed, no active concerns identified      Tobacco Use: No current tobacco use.      Assessment: Current Emotional / Mental Status (status of significant symptoms):  Risk status (Self / Other harm or suicidal ideation)  Patient denies a history of suicidal ideation, suicide attempts, self-injurious behavior, homicidal ideation, homicidal behavior and and other safety concerns     Patient denies current fears or concerns for personal safety.  Patient denies current or recent suicidal ideation or behaviors.  Patient denies current or recent homicidal ideation or behaviors.  Patient denies current or recent self injurious behavior or ideation.  Patient denies other safety concerns.     A safety and risk  management plan has not been developed at this time, however patient was encouraged to call [place] [phone] should there be a change in any of these risk factors.    Leslie Suicide Severity Rating Scale (Short Version)  Leslie Suicide Severity Rating (Short Version) 1/22/2019 1/24/2019 11/10/2019 12/2/2019 12/10/2019 6/11/2020 7/1/2020   Over the past 2 weeks have you felt down, depressed, or hopeless? - - no yes yes no no   Over the past 2 weeks have you had thoughts of killing yourself? - - no yes no no no   Comments - - - lots of stressors, has thought about not taking meds - - -   Have you ever attempted to kill yourself? - - no no no no no   Q1 Wished to be Dead (Past Month) no no - other (see comments) no - -   Comments - - - why did i do this surgery - - -   Q2 Suicidal Thoughts (Past Month) no no - no no - -   Comments - - - wishes he wouldn't have gone through surgery - - -   Q3 Suicidal Thought Method - - - no no - -   Q4 Suicidal Intent without Specific Plan - - - no no - -   Q5 Suicide Intent with Specific Plan - - - no no - -   Q6 Suicide Behavior (Lifetime) no no - no no - -         Appearance:   Appropriate   Eye Contact:   Good   Psychomotor Behavior: Restless   Attitude:   Cooperative  Interested  Orientation:   All  Speech   Rate / Production: Normal/ Responsive   Volume:  Normal   Mood:    Anxious  Depressed   Affect:    Appropriate    Thought Content:  Clear   Thought Form:  Goal Directed  Logical   Insight:    Good     Diagnoses:  1. Bipolar 1 disorder, depressed, mild (H)          Collateral Reports Completed:  Not Applicable    Plan: (Homework, other):  Continue with this writer for individual psychotherapy in two weeks.  Continue medication regimen. Avoid mood-altering substances.     Joseph Murillo, RED       ______________________________________________________________________    [facility] Integrated [facility]al Health Treatment Plan    Client's Name: Frandy BOYD Ji  Date Of  Birth: 1965    Date: 12/29/2020    DSM-V Diagnoses: 296.51 Bipolar I Disorder Current or Most Recent Episode Depressed, Mild;     Clinical Global Impressions  First:  Considering your total clinical experience with this particular patient population, how severe are the patient's symptoms at this time?: 4 (12/18/2020  2:22 PM)      Most recent:  Compared to the patient's condition at the START of treatment, this patient's condition is: 2 (12/18/2020  2:22 PM)          Referral / Collaboration:  Will collaborate with care team as indicated during treatment.    Anticipated number of session or this episode of care: 10+      MeasurableTreatment Goal(s) related to diagnosis / functional impairment(s)  Goal 1:  Patient will experience a reduction in depressive and anxious symptoms, along with a corresponding increase in positive emotion and life satisfaction.    Objective #A: Patient will experience a reduction in depressed mood, will develop more effective coping skills to manage depressive symptoms, will develop healthy cognitive patterns and beliefs, will increase ability to function adaptively and will continue to take medications as prescribed / participate in supportive activities and services    Status: Continued - Date(s): 12/29/2020    Objective #B: Patient will experience a reduction in anxiety, will develop more effective coping skills to manage anxiety symptoms, will develop healthy cognitive patterns and beliefs and will increase ability to function adaptively  Status: Continued - Date(s):12/29/2020    Objective #C: Patient will develop better understanding of triggers and coping strategies to stabilize mood  Status: Continued - Date(s): 12/29/2020    Goal 2:  Patient will identify and increase engagement in valued activity, i.e. improving social connections/relationships, pursuing occupational goals or personally meaningful pursuits, exploration of meaning in life.     Objective #A: Patient will identify  meaningful activity in social, occupational and  personal goals, and increase behavioral activation around these goals   Status: Continued - Date(s): 12/29/2020    Objective #B: Patient will address relationship difficulties in a more adaptive manner  Status: Continued - Date(s): 12/29/2020    Objective #C: Patient will develop coping/problem-solving skills to facilitate more adaptive adjustment and will effectively address problems that interfere with adaptive functioning  Status: Continued - Date(s): 12/29/2020        Possible Therapeutic Intervention(s)  Psycho-education regarding mental health diagnoses and treatment options    Skills training    Explore skills useful to client in current situation.    Skills include assertiveness, communication, conflict management, problem-solving, relaxation, etc.    Solution-Focused Therapy    Explore patterns in patient's relationships and discuss options for new behaviors.    Explore patterns in patient's actions and choices and discuss options for new behaviors.    Cognitive-behavioral Therapy    Discuss common cognitive distortions, identify them in patient's life.    Explore ways to challenge, replace, and act against these cognitions.    Acceptance and Commitment Therapy    Explore and identify important values in patient's life.    Discuss ways to commit to behavioral activation around these values.    Psychodynamic psychotherapy    Discuss patient's emotional dynamics and issues and how they impact behaviors.    Explore patient's history of relationships and how they impact present behaviors.    Explore how to work with and make changes in these schemas and patterns.    Narrative Therapy    Explore the patient's story of his/her life from his/her perspective.    Explore alternate ways of understanding their experience, identifying exceptions, developing new themes.    Interpersonal Psychotherapy    Explore patterns in relationships that are effective or ineffective  at helping patient reach their goals, find satisfying experience.    Discuss new patterns or behaviors to engage in for improved social functioning.    Behavioral Activation    Discuss steps patient can take to become more involved in meaningful activity.    Identify barriers to these activities and explore possible solutions.    Mindfulness-Based Strategies    Discuss skills based on development and application of mindfulness.    Skills drawn from compassion-focused therapy, dialectical behavior therapy, mindfulness-based stress reduction, mindfulness-based cognitive therapy, etc.      We have developed these goals together during our work to this point. Patient has assisted in the development of these goals and has agreed to this treatment plan.       Joseph Murillo, RED  December 29, 2020

## 2021-02-04 ENCOUNTER — TELEPHONE (OUTPATIENT)
Dept: TRANSPLANT | Facility: CLINIC | Age: 56
End: 2021-02-04

## 2021-02-18 DIAGNOSIS — E11.3313 TYPE 2 DIABETES MELLITUS WITH MODERATE NONPROLIFERATIVE RETINOPATHY OF BOTH EYES AND MACULAR EDEMA, UNSPECIFIED WHETHER LONG TERM INSULIN USE (H): Primary | ICD-10-CM

## 2021-02-20 DIAGNOSIS — N40.1 BENIGN PROSTATIC HYPERPLASIA WITH LOWER URINARY TRACT SYMPTOMS, SYMPTOM DETAILS UNSPECIFIED: ICD-10-CM

## 2021-02-22 RX ORDER — TAMSULOSIN HYDROCHLORIDE 0.4 MG/1
0.4 CAPSULE ORAL DAILY
Qty: 90 CAPSULE | Refills: 3 | Status: SHIPPED | OUTPATIENT
Start: 2021-02-22 | End: 2022-03-03

## 2021-02-24 DIAGNOSIS — N18.31 STAGE 3A CHRONIC KIDNEY DISEASE (H): Primary | ICD-10-CM

## 2021-02-25 ENCOUNTER — VIRTUAL VISIT (OUTPATIENT)
Dept: BEHAVIORAL HEALTH | Facility: CLINIC | Age: 56
End: 2021-02-25
Payer: MEDICARE

## 2021-02-25 ENCOUNTER — TELEPHONE (OUTPATIENT)
Dept: TRANSPLANT | Facility: CLINIC | Age: 56
End: 2021-02-25

## 2021-02-25 DIAGNOSIS — F31.31 BIPOLAR 1 DISORDER, DEPRESSED, MILD (H): Primary | ICD-10-CM

## 2021-02-25 PROCEDURE — 90834 PSYTX W PT 45 MINUTES: CPT | Mod: 95 | Performed by: PSYCHOLOGIST

## 2021-02-25 NOTE — PROGRESS NOTES
[facility]: Integrated Behavioral Health  February 25, 2021      Behavioral Health Clinician Progress Note    Patient Name: Frandy Workman           Service Type: Video visit      Service Location:  Video visit      Session Start Time: 3:03  Session End Time: 3:43      Session Length: 38 - 52      Attendees: Patient    Visit Activities (Refresh list every visit): [facility] Only     Telemedicine Visit: The patient's condition can be safely assessed and treated via synchronous audio and visual telemedicine encounter.      Reason for Telemedicine Visit: COVID-19    Originating Site (Patient Location): Patient's home    Distant Site (Provider Location): Provider Remote Setting    Consent:  The patient/guardian has verbally consented to: the potential risks and benefits of telemedicine (video visit) versus in person care; bill my insurance or make self-payment for services provided; and responsibility for payment of non-covered services.     Mode of Communication:  Video Conference via [employer]    As the provider I attest to compliance with applicable laws and regulations related to telemedicine.    Diagnostic Assessment Date: 12/03/2020  Treatment Plan Review Date:  3/29/2021  See Flowsheets for today's PHQ-9 and LIZZETTE-7 results  Previous PHQ-9:   PHQ-9 SCORE 1/9/2020 10/13/2020 12/29/2020   PHQ-9 Total Score MyChart - 8 (Mild depression) 5 (Mild depression)   PHQ-9 Total Score 16 8 5     Previous LIZZETTE-7:   LIZZETTE-7 SCORE 10/13/2020 12/29/2020   Total Score 6 (mild anxiety) 8 (mild anxiety)   Total Score 6 8       HEATH LEVEL:  No flowsheet data found.    DATA  Extended Session (60+ minutes): No  Interactive Complexity: No  Crisis: No    Treatment Objective(s) Addressed in This Session:  Target Behavior(s): disease management/lifestyle changes related to depressive and anxiety symptoms    Depression and anxious distress management.    Current Stressors / Issues:  Miller shared he has felt more  down and depressed over the last few weeks. He indicated struggling to get out of bed at times. When we explored what has changed for him, Miller shared that he continues to struggle with getting his disability renewal claim approved and has growing concern he will lose his monthly payments. Miller shared some information he received from his disability claim office indicating they were requesting integrated behavioral health records from me for selected dates. Miller shared he was unsure how to proceed with the request. Directed Miller to contact our records department to have assistance in releasing his preferred records to the disability claim company. I provided him our record's office phone number and email that he could also submit a request to.     I expressed concern to Miller about his increase in depressive symptoms and we explored more of why this situation is affecting him strongly. Provided supportive counseling. Inquired about suicidal ideation for a risk assessment and Miller denied SI, though is feeling frustrated and helpless at times with the disability claim process. Miller shared he wanted to call our records department to get this matter taken care of, so we agreed to end our call after 40 minutes. I encouraged him to contact me with any further questions or concerns to assist in the matter.     Progress on Treatment Objective(s) / Homework:  Satisfactory progress - ACTION (Actively working towards change); Intervened by reinforcing change plan / affirming steps taken    Answers for HPI/ROS submitted by the patient on 10/13/2020   If you checked off any problems, how difficult have these problems made it for you to do your work, take care of things at home, or get along with other people?: Somewhat difficult  PHQ9 TOTAL SCORE: 8*  LIZZETTE 7 TOTAL SCORE: 6    *No SI     Answers for HPI/ROS submitted by the patient on 12/29/2020   If you checked off any problems, how difficult have these problems made it for you to do  your work, take care of things at home, or get along with other people?: Somewhat difficult  PHQ9 TOTAL SCORE: 5*  LIZZETTE 7 TOTAL SCORE: 8    *No SI     Supportive counseling used    Solution-focused therapy used      Care Plan review completed: not today    Medication Review:  No changes to current psychiatric medication(s)     Medication Compliance:  Yes    Changes in Health Issues:   None reported    Chemical Use Review:   Substance Use: Chemical use reviewed, no active concerns identified      Tobacco Use: No current tobacco use.      Assessment: Current Emotional / Mental Status (status of significant symptoms):  Risk status (Self / Other harm or suicidal ideation)  Patient denies a history of suicidal ideation, suicide attempts, self-injurious behavior, homicidal ideation, homicidal behavior and and other safety concerns     Patient denies current fears or concerns for personal safety.  Patient denies current or recent suicidal ideation or behaviors.  Patient denies current or recent homicidal ideation or behaviors.  Patient denies current or recent self injurious behavior or ideation.  Patient denies other safety concerns.     A safety and risk management plan has not been developed at this time, however patient was encouraged to call [place] [phone] should there be a change in any of these risk factors.    [place] Suicide Severity Rating Scale (Short Version)  [place] Suicide Severity Rating (Short Version) 1/22/2019 1/24/2019 11/10/2019 12/2/2019 12/10/2019 6/11/2020 7/1/2020   Over the past 2 weeks have you felt down, depressed, or hopeless? - - no yes yes no no   Over the past 2 weeks have you had thoughts of killing yourself? - - no yes no no no   Comments - - - lots of stressors, has thought about not taking meds - - -   Have you ever attempted to kill yourself? - - no no no no no   Q1 Wished to be Dead (Past Month) no no - other (see comments) no - -   Comments - - - why did i do this surgery - - -    Q2 Suicidal Thoughts (Past Month) no no - no no - -   Comments - - - wishes he wouldn't have gone through surgery - - -   Q3 Suicidal Thought Method - - - no no - -   Q4 Suicidal Intent without Specific Plan - - - no no - -   Q5 Suicide Intent with Specific Plan - - - no no - -   Q6 Suicide Behavior (Lifetime) no no - no no - -         Appearance:   Appropriate   Eye Contact:   Good   Psychomotor Behavior: Restless   Attitude:   Cooperative  Interested  Orientation:   All  Speech   Rate / Production: Normal/ Responsive   Volume:  Normal   Mood:    Anxious  Depressed   Affect:    Appropriate    Thought Content:  Clear   Thought Form:  Goal Directed  Logical   Insight:    Good     Diagnoses:  1. Bipolar 1 disorder, depressed, mild (H)          Collateral Reports Completed:  Not Applicable    Plan: (Homework, other):  Continue with this writer for individual psychotherapy in two weeks.  Continue medication regimen. Avoid mood-altering substances.     Joseph Murillo,        ______________________________________________________________________    [facility] Integrated Behavioral Health Treatment Plan    Client's Name: Frandy Workman  YOB: 1965    Date: 12/29/2020    DSM-V Diagnoses: 296.51 Bipolar I Disorder Current or Most Recent Episode Depressed, Mild;     Clinical Global Impressions  First:  Considering your total clinical experience with this particular patient population, how severe are the patient's symptoms at this time?: 4 (12/18/2020  2:22 PM)      Most recent:  Compared to the patient's condition at the START of treatment, this patient's condition is: 2 (12/18/2020  2:22 PM)          Referral / Collaboration:  Will collaborate with care team as indicated during treatment.    Anticipated number of session or this episode of care: 10+      MeasurableTreatment Goal(s) related to diagnosis / functional impairment(s)  Goal 1:  Patient will experience a reduction in depressive and anxious symptoms,  along with a corresponding increase in positive emotion and life satisfaction.    Objective #A: Patient will experience a reduction in depressed mood, will develop more effective coping skills to manage depressive symptoms, will develop healthy cognitive patterns and beliefs, will increase ability to function adaptively and will continue to take medications as prescribed / participate in supportive activities and services    Status: Continued - Date(s): 12/29/2020    Objective #B: Patient will experience a reduction in anxiety, will develop more effective coping skills to manage anxiety symptoms, will develop healthy cognitive patterns and beliefs and will increase ability to function adaptively  Status: Continued - Date(s):12/29/2020    Objective #C: Patient will develop better understanding of triggers and coping strategies to stabilize mood  Status: Continued - Date(s): 12/29/2020    Goal 2:  Patient will identify and increase engagement in valued activity, i.e. improving social connections/relationships, pursuing occupational goals or personally meaningful pursuits, exploration of meaning in life.     Objective #A: Patient will identify meaningful activity in social, occupational and  personal goals, and increase behavioral activation around these goals   Status: Continued - Date(s): 12/29/2020    Objective #B: Patient will address relationship difficulties in a more adaptive manner  Status: Continued - Date(s): 12/29/2020    Objective #C: Patient will develop coping/problem-solving skills to facilitate more adaptive adjustment and will effectively address problems that interfere with adaptive functioning  Status: Continued - Date(s): 12/29/2020        Possible Therapeutic Intervention(s)  Psycho-education regarding mental health diagnoses and treatment options    Skills training    Explore skills useful to client in current situation.    Skills include assertiveness, communication, conflict management,  problem-solving, relaxation, etc.    Solution-Focused Therapy    Explore patterns in patient's relationships and discuss options for new behaviors.    Explore patterns in patient's actions and choices and discuss options for new behaviors.    Cognitive-behavioral Therapy    Discuss common cognitive distortions, identify them in patient's life.    Explore ways to challenge, replace, and act against these cognitions.    Acceptance and Commitment Therapy    Explore and identify important values in patient's life.    Discuss ways to commit to behavioral activation around these values.    Psychodynamic psychotherapy    Discuss patient's emotional dynamics and issues and how they impact behaviors.    Explore patient's history of relationships and how they impact present behaviors.    Explore how to work with and make changes in these schemas and patterns.    Narrative Therapy    Explore the patient's story of his/her life from his/her perspective.    Explore alternate ways of understanding their experience, identifying exceptions, developing new themes.    Interpersonal Psychotherapy    Explore patterns in relationships that are effective or ineffective at helping patient reach their goals, find satisfying experience.    Discuss new patterns or behaviors to engage in for improved social functioning.    Behavioral Activation    Discuss steps patient can take to become more involved in meaningful activity.    Identify barriers to these activities and explore possible solutions.    Mindfulness-Based Strategies    Discuss skills based on development and application of mindfulness.    Skills drawn from compassion-focused therapy, dialectical behavior therapy, mindfulness-based stress reduction, mindfulness-based cognitive therapy, etc.      We have developed these goals together during our work to this point. Patient has assisted in the development of these goals and has agreed to this treatment plan.       Joseph Murillo,  LP  December 29, 2020

## 2021-02-25 NOTE — TELEPHONE ENCOUNTER
[facility] Phone Call      Data: I received a voice message to call patient.  Intervention: I called Miller and discussed his concerns.  Assessment: Miller reports his long-term disability policy is reviewing his benefits and he is concerned about what to do if they determine he is no longer disabled.  He plans to appeal if they determine he is no longer disabled.  Miller will consider consulting with [employer] or hiring a disability .  Miller reports his SSDI benefits will be reviewed in 2023.  Miller does not believe he can return to work.  Education provided by SW: [employer], Disability   Plan: I will remain available to assist patient.  He will contact me about the outcome of his long-term disability insurance review.        ALEXYS Mcnair, [facility]  Liver Transplant   Phone [phone]  Pager [phone]

## 2021-02-26 ENCOUNTER — TELEPHONE (OUTPATIENT)
Dept: TRANSPLANT | Facility: CLINIC | Age: 56
End: 2021-02-26

## 2021-02-26 DIAGNOSIS — Z94.4 LIVER REPLACED BY TRANSPLANT (H): ICD-10-CM

## 2021-02-26 DIAGNOSIS — N18.31 STAGE 3A CHRONIC KIDNEY DISEASE (H): ICD-10-CM

## 2021-02-26 LAB
ALBUMIN SERPL-MCNC: 4.4 G/DL (ref 3.4–5)
ALP SERPL-CCNC: 122 U/L (ref 40–150)
ALT SERPL W P-5'-P-CCNC: 32 U/L (ref 0–70)
ANION GAP SERPL CALCULATED.3IONS-SCNC: 3 MMOL/L (ref 3–14)
AST SERPL W P-5'-P-CCNC: 15 U/L (ref 0–45)
BILIRUB DIRECT SERPL-MCNC: 0.2 MG/DL (ref 0–0.2)
BILIRUB SERPL-MCNC: 0.7 MG/DL (ref 0.2–1.3)
BUN SERPL-MCNC: 44 MG/DL (ref 7–30)
CALCIUM SERPL-MCNC: 9.9 MG/DL (ref 8.5–10.1)
CHLORIDE SERPL-SCNC: 108 MMOL/L (ref 94–109)
CO2 SERPL-SCNC: 29 MMOL/L (ref 20–32)
CREAT SERPL-MCNC: 1.82 MG/DL (ref 0.66–1.25)
CREAT UR-MCNC: 175 MG/DL
ERYTHROCYTE [DISTWIDTH] IN BLOOD BY AUTOMATED COUNT: 13.3 % (ref 10–15)
GFR SERPL CREATININE-BSD FRML MDRD: 40 ML/MIN/{1.73_M2}
GLUCOSE SERPL-MCNC: 105 MG/DL (ref 70–99)
HCT VFR BLD AUTO: 41.1 % (ref 40–53)
HGB BLD-MCNC: 13.9 G/DL (ref 13.3–17.7)
MAGNESIUM SERPL-MCNC: 2.2 MG/DL (ref 1.6–2.3)
MCH RBC QN AUTO: 32.4 PG (ref 26.5–33)
MCHC RBC AUTO-ENTMCNC: 33.8 G/DL (ref 31.5–36.5)
MCV RBC AUTO: 96 FL (ref 78–100)
MICROALBUMIN UR-MCNC: 563 MG/L
MICROALBUMIN/CREAT UR: 321.71 MG/G CR (ref 0–17)
PHOSPHATE SERPL-MCNC: 4 MG/DL (ref 2.5–4.5)
PLATELET # BLD AUTO: 124 10E9/L (ref 150–450)
POTASSIUM SERPL-SCNC: 5 MMOL/L (ref 3.4–5.3)
PROT SERPL-MCNC: 8.2 G/DL (ref 6.8–8.8)
PROT UR-MCNC: 0.82 G/L
PROT/CREAT 24H UR: 0.47 G/G CR (ref 0–0.2)
RBC # BLD AUTO: 4.29 10E12/L (ref 4.4–5.9)
SODIUM SERPL-SCNC: 140 MMOL/L (ref 133–144)
TACROLIMUS BLD-MCNC: 5.6 UG/L (ref 5–15)
TME LAST DOSE: NORMAL H
WBC # BLD AUTO: 6 10E9/L (ref 4–11)

## 2021-02-26 PROCEDURE — 80069 RENAL FUNCTION PANEL: CPT | Performed by: PATHOLOGY

## 2021-02-26 PROCEDURE — 84155 ASSAY OF PROTEIN SERUM: CPT | Performed by: PATHOLOGY

## 2021-02-26 PROCEDURE — 84450 TRANSFERASE (AST) (SGOT): CPT | Performed by: PATHOLOGY

## 2021-02-26 PROCEDURE — 85027 COMPLETE CBC AUTOMATED: CPT | Performed by: PATHOLOGY

## 2021-02-26 PROCEDURE — 82043 UR ALBUMIN QUANTITATIVE: CPT | Performed by: PATHOLOGY

## 2021-02-26 PROCEDURE — 80197 ASSAY OF TACROLIMUS: CPT | Performed by: INTERNAL MEDICINE

## 2021-02-26 PROCEDURE — 36415 COLL VENOUS BLD VENIPUNCTURE: CPT | Performed by: PATHOLOGY

## 2021-02-26 PROCEDURE — 83735 ASSAY OF MAGNESIUM: CPT | Performed by: PATHOLOGY

## 2021-02-26 PROCEDURE — 84156 ASSAY OF PROTEIN URINE: CPT | Performed by: PATHOLOGY

## 2021-02-26 PROCEDURE — 84075 ASSAY ALKALINE PHOSPHATASE: CPT | Performed by: PATHOLOGY

## 2021-02-26 PROCEDURE — 82248 BILIRUBIN DIRECT: CPT | Performed by: PATHOLOGY

## 2021-02-26 PROCEDURE — 82247 BILIRUBIN TOTAL: CPT | Performed by: PATHOLOGY

## 2021-02-26 PROCEDURE — 84460 ALANINE AMINO (ALT) (SGPT): CPT | Performed by: PATHOLOGY

## 2021-02-26 NOTE — TELEPHONE ENCOUNTER
Creat 1.82.  This is high for Miller.  He has an appt w/ Dr. Rao on Monday, please remind him of this and encourage him to drink more water.

## 2021-03-01 ENCOUNTER — VIRTUAL VISIT (OUTPATIENT)
Dept: NEPHROLOGY | Facility: CLINIC | Age: 56
End: 2021-03-01
Attending: INTERNAL MEDICINE
Payer: MEDICARE

## 2021-03-01 VITALS — DIASTOLIC BLOOD PRESSURE: 84 MMHG | HEART RATE: 59 BPM | SYSTOLIC BLOOD PRESSURE: 129 MMHG

## 2021-03-01 DIAGNOSIS — N18.32 STAGE 3B CHRONIC KIDNEY DISEASE (H): Primary | ICD-10-CM

## 2021-03-01 PROCEDURE — 99213 OFFICE O/P EST LOW 20 MIN: CPT | Mod: 95 | Performed by: INTERNAL MEDICINE

## 2021-03-01 RX ORDER — ZOLPIDEM TARTRATE 10 MG/1
10 TABLET ORAL DAILY
COMMUNITY
Start: 2021-02-18 | End: 2021-06-29

## 2021-03-01 ASSESSMENT — PAIN SCALES - GENERAL: PAINLEVEL: MODERATE PAIN (4)

## 2021-03-01 NOTE — LETTER
3/1/2021       RE: Frandy Workman  [address] [facility] [address]     Dear Colleague,    Thank you for referring your patient, Frandy Workman, to the [facility] [place] [facility]. Please see a copy of my visit note below.      Nephrology Clinic - Telephone Visit    Eloy Rao MD  2021     Name: Frandy Workman  MRN: [id number]  Age: 56 year old  : 1965  Referring provider: Natalie Russell     Assessment and Plan:  1. CKD, stage 3b (A3): Prior to recent liver transplant baseline Cr ~1.1 mg/dL with eGFR of 75 ml/min. Post-transplant creatinine is now ~1.8 mg/dL (eGFR of 40 ml/min).  I suspect he likely has some degree of diabetic changes further complicated by chronic changes from past lithium use (for 15 years) and now primarily driven by tacrolimus toxicity.  He is at risk of progressive CKD due to tacrolimus and diabetes.      -would favor lower tacrolimus levels as able and potentially changing to another immunosuppressive in the near future  -we discussed starting RAAS blockade instead of carvedilol, especially given he now has low grade albuminuria (~322 mg/g).  However, given that his potassium is high normal, I am concerned about some underlying type 4 RTA due to diabetes and/or tacrolimus such that starting RAAS blockade may provide more risk than benefit.  At this point, given recent initiation of empagliflozin which may help with his albuminuria we will just monitor for now and reconsider starting him on an ACEi/ARB at his next visit should he develop worsening albuminuria.     -RTC in 3 months     2. HTN/Volume status: Recently hypertensive though now with better blood pressure control after his PCP started him on low dose carvedilol.  Euvolemic by report and no longer on lasix.   -as mentioned would consider starting him on RAAS blockade in place of low dose carvedilol at  next visit (see problem 1)     3.  Electrolytes:  Potassium on higher side.  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      4. Anemia: Possibly related to Imuran in the past, which has been discontinued.  Iron stores are replete.  Anemia of chronic disease and renal insufficiency also likely contributing.  He did IVELISSE treatment and hemoglobin  Now at 13.9 and no further need for IVELISSE since August, 2020.    -monitor for now      Follow-up: No follow-ups on file.     Reason For Visit:   CKD    HPI:   Frandy Workman is a 56 year old man who presents for CKD f/u.  His past medical history is remarkable for liver transplant (November, 2019) for ESTRADA cirrhosis (complicated by ascites and hepatic encephalopathy on lactulose and rifaximin in the past), hepatocellular carcinoma (s/p TACE and microwave ablation 01/2019), long standing DM 2 (complicated by nonproliferative diabetic retinopathy, macular edema and peripheral neuropathy), bipolar disorder (previously on lithium for 15 years), HTN, hyperlipidemia and CAD by angiography not requiring PCI.      He denies excessive use of NSAIDs in the past.  He had no history of nephrolithiasis or frequent UTIs.  He has no significant family history of CKD.     In terms of CKD, he appeared to have a baseline of ~1.1 mg/dL prior to his liver transplant in November 2019.  Creatinine after transplant was ~1.3 mg/dL at time of discharge and vikram to 3.2 mg/dL thought to be prerenal from volume depletion.  He was admitted for IVF and creatinine improved to 1.5 mg/dL at time of discharge.  His serum Cr now seems to fluctuate around 1.8 mg/dL with an eGFR of 40 ml/min.  He continues on tacrolimus for his liver transplant.  He no longer is on lasix for management of edema.  He has not required IVELISSE therapy in several months (last August, 2020).  He recently was started on low dose carvedilol for management of  hypertension.  He also recently started empagliflozin for management of diabetes. He reports weight gain due to lack of exercise during the COVID pandemic.  His main complaint today is that of pain near his incisional scars for his liver transplant for which he is following up with hepatology.  His BP this morning was 129/84.      Review of Systems:   Pertinent items are noted in HPI or as below, remainder of complete ROS is negative.      Active Medications:   Current Outpatient Medications   Medication     Acetaminophen (TYLENOL) 325 MG CAPS     ARIPiprazole (ABILIFY) 5 MG tablet     Artificial Tear Solution (SM ARTIFICIAL TEARS) SOLN     aspirin 81 MG EC tablet     BD VIKTORIA U/F 32G X 4 MM insulin pen needle     carvedilol (COREG) 3.125 MG tablet     ciclopirox (LOPROX) 0.77 % cream     Continuous Blood Gluc  (FREESTYLE CLAUDIA 14 DAY READER) CECILIO     Continuous Blood Gluc Sensor (FREESTYLE CLAUDIA 14 DAY SENSOR) MISC     empagliflozin (JARDIANCE) 25 MG TABS tablet     glucose (BD GLUCOSE) 4 g chewable tablet     insulin aspart (NOVOLOG PEN) 100 UNIT/ML pen     insulin degludec (TRESIBA FLEXTOUCH) 100 UNIT/ML pen     lamiVUDine (EPIVIR) 100 MG tablet     Multiple Vitamin (THERAVITE PO)     order for DME     pantoprazole (PROTONIX) 40 MG EC tablet     polyethylene glycol (MIRALAX) 17 g packet     rosuvastatin (CRESTOR) 5 MG tablet     tacrolimus (GENERIC EQUIVALENT) 1 MG capsule     tamsulosin (FLOMAX) 0.4 MG capsule     vitamin B-12 (CYANOCOBALAMIN) 1000 MCG tablet     vitamin D3 (CHOLECALCIFEROL) 2000 units (50 mcg) tablet     zolpidem (AMBIEN) 10 MG tablet     Current Facility-Administered Medications   Medication     aflibercept (EYLEA) injection prefilled syringe 2 mg        Allergies:   Codeine and Seasonal allergies      Past Medical History:  Chronic kidney disease, stage 3  Type 2 diabetes mellitus  Bipolar affective disorder   Brow ptosis  Hepatocellular carcinoma  Coronary artery disease    Hypertension   Anemia   Anxiety   Mild recurrent major depression  Mild nonproliferative retinopathy  Gastroesophageal reflux disease   Cholelithiasis   Benign prostatic hypertrophy   Portal vein thrombosis      Past Surgical History:  Liver transplant recipient   Coronary catheterization  Parotidectomy, radical neck dissection  Right eyelid weight placement  Orthopedic surgery    Family History:   Prostate cancer - maternal grandfather, father   Substance abuse - maternal grandfather, maternal grandmother   Colon cancer - father, paternal grandmother  Cancer - mother  Thyroid disease - mother, sister   Stroke - mother  Depression - mother, sister  Asthma - sister      Social History:   Presents to clinic alone  Tobacco Use: Former smoker, 180 pack year history, quit 2017.   Alcohol Use: quit September 1996  PCP: Maximo Tucker      Physical Exam:  /84   Pulse 59    Physical exam deferred as encounter was a telephone visit.      Laboratory:  CMP  Recent Labs   Lab Test 02/26/21  0743 01/20/21  0755 12/30/20  0757 12/02/20  0739 09/25/20  0859 09/25/20  0859 09/16/20  0803 08/19/20  0730    138 138 136   < > 137 139 139   POTASSIUM 5.0 4.9 4.2 5.3   < > 4.9 4.9 5.4*   CHLORIDE 108 107 107 104   < > 109 108 108   CO2 29 27 27 28   < > 22 27 28   ANIONGAP 3 4 5 4   < > 7 4 3   * 147* 236* 261*   < > 276* 252* 214*   BUN 44* 39* 40* 37*   < > 39* 39* 34*   CR 1.82* 1.62* 1.79* 1.55*   < > 1.48* 1.60* 1.58*   GFRESTIMATED 40* 46* 41* 49*   < > 52* 47* 48*   GFRESTBLACK 47* 54* 48* 57*   < > 60* 55* 56*   DEBORAH 9.9 9.7 9.3 9.4   < > 9.0 9.1 8.8   MAG 2.2 2.4* 2.2 2.0   < > 1.6 1.8 1.8   PHOS 4.0  --   --   --   --  3.4 3.9 3.5   PROTTOTAL 8.2 8.4 8.2 8.3   < > 7.5 8.0 7.0   ALBUMIN 4.4 4.2 4.1 4.3   < > 3.7 3.9 3.9   BILITOTAL 0.7 0.6 0.6 0.8   < > 0.6 0.5 0.4   ALKPHOS 122 140 145 145   < > 129 132 110   AST 15 17 18 25   < > 14 10 18   ALT 32 33 34 36   < > 34 27 33    < > = values in this interval  not displayed.     CBC  Recent Labs   Lab Test 02/26/21  0743 01/20/21  0755 12/30/20  0757 12/02/20  0739   HGB 13.9 13.3 12.5* 12.9*   WBC 6.0 5.9 4.5 4.7   RBC 4.29* 4.05* 3.78* 3.88*   HCT 41.1 39.0* 37.7* 37.7*   MCV 96 96 100 97   MCH 32.4 32.8 33.1* 33.2*   MCHC 33.8 34.1 33.2 34.2   RDW 13.3 13.5 14.1 14.3   * 128* 124* 131*     INR  Recent Labs   Lab Test 01/24/20  0837 12/05/19  0824 11/16/19  0648 11/15/19  0449 11/12/19  1400 11/12/19  1400 11/12/19  0346 11/12/19  0346 11/11/19  1651 11/11/19  1651 11/11/19  1600   INR 1.09 1.14 1.11 1.12   < > 1.46*   < > 1.47*   < >  --  1.60*   PTT  --   --   --   --   --  39*  --  57*  --  48* 48*    < > = values in this interval not displayed.     ABG  Recent Labs   Lab Test 11/12/19  1513 11/12/19  1400 11/11/19  1735 11/11/19  1651 11/11/19  1600   PH 7.37 7.36  --  7.41 7.48*   PCO2 42 43  --  42 36   PO2 114* 92  --  81 98   HCO3 24 24  --  27 26   O2PER 44% 41% 30 32 32.0      URINE STUDIES  Recent Labs   Lab Test 12/04/19  1400 11/15/19  1123 07/08/19  1017 02/04/19  0705   COLOR Light Yellow Light Yellow Yellow Yellow   APPEARANCE Clear Clear Clear Clear   URINEGLC 30* 300* >499* 150*   URINEBILI Negative Negative Negative Negative   URINEKETONE Negative Negative Negative Negative   SG 1.009 1.007 1.017 1.016   UBLD Negative Small* Negative Negative   URINEPH 5.0 6.5 5.0 5.0   PROTEIN 30* Negative Negative Negative   NITRITE Negative Negative Negative Negative   LEUKEST Negative Negative Negative Negative   RBCU 0 0 <1 1   WBCU 1 <1 3 1     Recent Labs   Lab Test 02/26/21  0750 06/29/20  1008 03/02/20  1013 12/02/19  1040 07/08/19  1017 02/04/19  0705   UTPG 0.47* 0.89* 0.29* 1.22* 0.11 0.14     PTH  Recent Labs   Lab Test 06/29/20  1005 07/08/19  1020   PTHI 61 54     IRON STUDIES   Recent Labs   Lab Test 12/02/20  0739 11/11/20  0754 10/14/20  0836 09/16/20  0802 07/29/20  0749 06/29/20  1005 05/21/20  0723 04/22/20  0833 03/09/20  0823  01/27/20  1340 12/02/19  1034 12/28/18  1108 09/15/18  1215 06/09/17  1250   IRON 75 81 73 93 87 60 72 65 92  --  88  --  52  --    * 227* 248 221* 230* 204* 192* 215* 231*  --  248  --  403  --    IRONSAT 33 36 29 42 38 29 38 30 40  --  36  --  13*  --    QUAN 433* 286 323 223 193 352 344 643* 859* 690* 1,353* 90 10* 450*      Miller is a 56 year old who is being evaluated via a billable telephone visit.      What phone number would you like to be contacted at? [phone]  How would you like to obtain your AVS? Mail a copy  Phone call duration: 35 minutes    SIRISHA Campo MD       Again, thank you for allowing me to participate in the care of your patient.      Sincerely,    Eloy Rao MD

## 2021-03-01 NOTE — PROGRESS NOTES
Nephrology Clinic - Telephone Visit    Eloy Rao MD  2021     Name: Frandy Workman  MRN: [id number]  Age: 56 year old  : 1965  Referring provider: Natalie Russell     Assessment and Plan:  1. CKD, stage 3b (A3): Prior to recent liver transplant baseline Cr ~1.1 mg/dL with eGFR of 75 ml/min. Post-transplant creatinine is now ~1.8 mg/dL (eGFR of 40 ml/min).  I suspect he likely has some degree of diabetic changes further complicated by chronic changes from past lithium use (for 15 years) and now primarily driven by tacrolimus toxicity.  He is at risk of progressive CKD due to tacrolimus and diabetes.      -would favor lower tacrolimus levels as able and potentially changing to another immunosuppressive in the near future  -we discussed starting RAAS blockade instead of carvedilol, especially given he now has low grade albuminuria (~322 mg/g).  However, given that his potassium is high normal, I am concerned about some underlying type 4 RTA due to diabetes and/or tacrolimus such that starting RAAS blockade may provide more risk than benefit.  At this point, given recent initiation of empagliflozin which may help with his albuminuria we will just monitor for now and reconsider starting him on an ACEi/ARB at his next visit should he develop worsening albuminuria.     -RTC in 3 months     2. HTN/Volume status: Recently hypertensive though now with better blood pressure control after his PCP started him on low dose carvedilol.  Euvolemic by report and no longer on lasix.   -as mentioned would consider starting him on RAAS blockade in place of low dose carvedilol at next visit (see problem 1)     3.  Electrolytes:  Potassium on higher side.  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      4. Anemia: Possibly related to Imuran in the past, which has been discontinued.  Iron stores are replete.  Anemia of  chronic disease and renal insufficiency also likely contributing.  He did IVELISSE treatment and hemoglobin  Now at 13.9 and no further need for IVELISSE since August, 2020.    -monitor for now      Follow-up: No follow-ups on file.     Reason For Visit:   CKD    HPI:   Frandy Workman is a 56 year old man who presents for CKD f/u.  His past medical history is remarkable for liver transplant (November, 2019) for ESTRADA cirrhosis (complicated by ascites and hepatic encephalopathy on lactulose and rifaximin in the past), hepatocellular carcinoma (s/p TACE and microwave ablation 01/2019), long standing DM 2 (complicated by nonproliferative diabetic retinopathy, macular edema and peripheral neuropathy), bipolar disorder (previously on lithium for 15 years), HTN, hyperlipidemia and CAD by angiography not requiring PCI.      He denies excessive use of NSAIDs in the past.  He had no history of nephrolithiasis or frequent UTIs.  He has no significant family history of CKD.     In terms of CKD, he appeared to have a baseline of ~1.1 mg/dL prior to his liver transplant in November 2019.  Creatinine after transplant was ~1.3 mg/dL at time of discharge and vikram to 3.2 mg/dL thought to be prerenal from volume depletion.  He was admitted for IVF and creatinine improved to 1.5 mg/dL at time of discharge.  His serum Cr now seems to fluctuate around 1.8 mg/dL with an eGFR of 40 ml/min.  He continues on tacrolimus for his liver transplant.  He no longer is on lasix for management of edema.  He has not required IVELISSE therapy in several months (last August, 2020).  He recently was started on low dose carvedilol for management of hypertension.  He also recently started empagliflozin for management of diabetes. He reports weight gain due to lack of exercise during the COVID pandemic.  His main complaint today is that of pain near his incisional scars for his liver transplant for which he is following up with hepatology.  His BP this morning was  129/84.      Review of Systems:   Pertinent items are noted in HPI or as below, remainder of complete ROS is negative.      Active Medications:   Current Outpatient Medications   Medication     Acetaminophen (TYLENOL) 325 MG CAPS     ARIPiprazole (ABILIFY) 5 MG tablet     Artificial Tear Solution (SM ARTIFICIAL TEARS) SOLN     aspirin 81 MG EC tablet     BD VIKTORIA U/F 32G X 4 MM insulin pen needle     carvedilol (COREG) 3.125 MG tablet     ciclopirox (LOPROX) 0.77 % cream     Continuous Blood Gluc  (FREESTYLE CLAUDIA 14 DAY READER) CECILIO     Continuous Blood Gluc Sensor (FREESTYLE CLAUDIA 14 DAY SENSOR) MISC     empagliflozin (JARDIANCE) 25 MG TABS tablet     glucose (BD GLUCOSE) 4 g chewable tablet     insulin aspart (NOVOLOG PEN) 100 UNIT/ML pen     insulin degludec (TRESIBA FLEXTOUCH) 100 UNIT/ML pen     lamiVUDine (EPIVIR) 100 MG tablet     Multiple Vitamin (THERAVITE PO)     order for DME     pantoprazole (PROTONIX) 40 MG EC tablet     polyethylene glycol (MIRALAX) 17 g packet     rosuvastatin (CRESTOR) 5 MG tablet     tacrolimus (GENERIC EQUIVALENT) 1 MG capsule     tamsulosin (FLOMAX) 0.4 MG capsule     vitamin B-12 (CYANOCOBALAMIN) 1000 MCG tablet     vitamin D3 (CHOLECALCIFEROL) 2000 units (50 mcg) tablet     zolpidem (AMBIEN) 10 MG tablet     Current Facility-Administered Medications   Medication     aflibercept (EYLEA) injection prefilled syringe 2 mg        Allergies:   Codeine and Seasonal allergies      Past Medical History:  Chronic kidney disease, stage 3  Type 2 diabetes mellitus  Bipolar affective disorder   Brow ptosis  Hepatocellular carcinoma  Coronary artery disease   Hypertension   Anemia   Anxiety   Mild recurrent major depression  Mild nonproliferative retinopathy  Gastroesophageal reflux disease   Cholelithiasis   Benign prostatic hypertrophy   Portal vein thrombosis      Past Surgical History:  Liver transplant recipient   Coronary catheterization  Parotidectomy, radical neck  dissection  Right eyelid weight placement  Orthopedic surgery    Family History:   Prostate cancer - maternal grandfather, father   Substance abuse - maternal grandfather, maternal grandmother   Colon cancer - father, paternal grandmother  Cancer - mother  Thyroid disease - mother, sister   Stroke - mother  Depression - mother, sister  Asthma - sister      Social History:   Presents to clinic alone  Tobacco Use: Former smoker, 180 pack year history, quit 2017.   Alcohol Use: quit September 1996  PCP: Maximo Tucker      Physical Exam:  /84   Pulse 59    Physical exam deferred as encounter was a telephone visit.      Laboratory:  CMP  Recent Labs   Lab Test 02/26/21  0743 01/20/21  0755 12/30/20  0757 12/02/20  0739 09/25/20  0859 09/25/20  0859 09/16/20  0803 08/19/20  0730    138 138 136   < > 137 139 139   POTASSIUM 5.0 4.9 4.2 5.3   < > 4.9 4.9 5.4*   CHLORIDE 108 107 107 104   < > 109 108 108   CO2 29 27 27 28   < > 22 27 28   ANIONGAP 3 4 5 4   < > 7 4 3   * 147* 236* 261*   < > 276* 252* 214*   BUN 44* 39* 40* 37*   < > 39* 39* 34*   CR 1.82* 1.62* 1.79* 1.55*   < > 1.48* 1.60* 1.58*   GFRESTIMATED 40* 46* 41* 49*   < > 52* 47* 48*   GFRESTBLACK 47* 54* 48* 57*   < > 60* 55* 56*   DEBORAH 9.9 9.7 9.3 9.4   < > 9.0 9.1 8.8   MAG 2.2 2.4* 2.2 2.0   < > 1.6 1.8 1.8   PHOS 4.0  --   --   --   --  3.4 3.9 3.5   PROTTOTAL 8.2 8.4 8.2 8.3   < > 7.5 8.0 7.0   ALBUMIN 4.4 4.2 4.1 4.3   < > 3.7 3.9 3.9   BILITOTAL 0.7 0.6 0.6 0.8   < > 0.6 0.5 0.4   ALKPHOS 122 140 145 145   < > 129 132 110   AST 15 17 18 25   < > 14 10 18   ALT 32 33 34 36   < > 34 27 33    < > = values in this interval not displayed.     CBC  Recent Labs   Lab Test 02/26/21  0743 01/20/21  0755 12/30/20  0757 12/02/20  0739   HGB 13.9 13.3 12.5* 12.9*   WBC 6.0 5.9 4.5 4.7   RBC 4.29* 4.05* 3.78* 3.88*   HCT 41.1 39.0* 37.7* 37.7*   MCV 96 96 100 97   MCH 32.4 32.8 33.1* 33.2*   MCHC 33.8 34.1 33.2 34.2   RDW 13.3 13.5 14.1 14.3   PLT  124* 128* 124* 131*     INR  Recent Labs   Lab Test 01/24/20  0837 12/05/19  0824 11/16/19  0648 11/15/19  0449 11/12/19  1400 11/12/19  1400 11/12/19  0346 11/12/19  0346 11/11/19  1651 11/11/19  1651 11/11/19  1600   INR 1.09 1.14 1.11 1.12   < > 1.46*   < > 1.47*   < >  --  1.60*   PTT  --   --   --   --   --  39*  --  57*  --  48* 48*    < > = values in this interval not displayed.     ABG  Recent Labs   Lab Test 11/12/19  1513 11/12/19  1400 11/11/19  1735 11/11/19  1651 11/11/19  1600   PH 7.37 7.36  --  7.41 7.48*   PCO2 42 43  --  42 36   PO2 114* 92  --  81 98   HCO3 24 24  --  27 26   O2PER 44% 41% 30 32 32.0      URINE STUDIES  Recent Labs   Lab Test 12/04/19  1400 11/15/19  1123 07/08/19  1017 02/04/19  0705   COLOR Light Yellow Light Yellow Yellow Yellow   APPEARANCE Clear Clear Clear Clear   URINEGLC 30* 300* >499* 150*   URINEBILI Negative Negative Negative Negative   URINEKETONE Negative Negative Negative Negative   SG 1.009 1.007 1.017 1.016   UBLD Negative Small* Negative Negative   URINEPH 5.0 6.5 5.0 5.0   PROTEIN 30* Negative Negative Negative   NITRITE Negative Negative Negative Negative   LEUKEST Negative Negative Negative Negative   RBCU 0 0 <1 1   WBCU 1 <1 3 1     Recent Labs   Lab Test 02/26/21  0750 06/29/20  1008 03/02/20  1013 12/02/19  1040 07/08/19  1017 02/04/19  0705   UTPG 0.47* 0.89* 0.29* 1.22* 0.11 0.14     PTH  Recent Labs   Lab Test 06/29/20  1005 07/08/19  1020   PTHI 61 54     IRON STUDIES   Recent Labs   Lab Test 12/02/20  0739 11/11/20  0754 10/14/20  0836 09/16/20  0802 07/29/20  0749 06/29/20  1005 05/21/20  0723 04/22/20  0833 03/09/20  0823 01/27/20  1340 12/02/19  1034 12/28/18  1108 09/15/18  1215 06/09/17  1250   IRON 75 81 73 93 87 60 72 65 92  --  88  --  52  --    * 227* 248 221* 230* 204* 192* 215* 231*  --  248  --  403  --    IRONSAT 33 36 29 42 38 29 38 30 40  --  36  --  13*  --    QUAN 433* 286 323 223 193 352 344 643* 859* 690* 1,353* 90 10* 450*       Miller is a 56 year old who is being evaluated via a billable telephone visit.      What phone number would you like to be contacted at? [phone]  How would you like to obtain your AVS? Mail a copy  Phone call duration: 35 minutes    SIRISHA Campo MD

## 2021-03-03 ENCOUNTER — OFFICE VISIT (OUTPATIENT)
Dept: OPHTHALMOLOGY | Facility: CLINIC | Age: 56
End: 2021-03-03
Attending: OPHTHALMOLOGY
Payer: MEDICARE

## 2021-03-03 DIAGNOSIS — E11.3313 TYPE 2 DIABETES MELLITUS WITH MODERATE NONPROLIFERATIVE RETINOPATHY OF BOTH EYES AND MACULAR EDEMA, UNSPECIFIED WHETHER LONG TERM INSULIN USE (H): ICD-10-CM

## 2021-03-03 PROCEDURE — 250N000011 HC RX IP 250 OP 636: Performed by: OPHTHALMOLOGY

## 2021-03-03 PROCEDURE — 67028 INJECTION EYE DRUG: CPT | Mod: LT | Performed by: OPHTHALMOLOGY

## 2021-03-03 PROCEDURE — 92012 INTRM OPH EXAM EST PATIENT: CPT | Mod: 25 | Performed by: OPHTHALMOLOGY

## 2021-03-03 PROCEDURE — 92134 CPTRZ OPH DX IMG PST SGM RTA: CPT | Performed by: OPHTHALMOLOGY

## 2021-03-03 PROCEDURE — G0463 HOSPITAL OUTPT CLINIC VISIT: HCPCS | Mod: 25

## 2021-03-03 RX ADMIN — AFLIBERCEPT 2 MG: 40 INJECTION, SOLUTION INTRAVITREAL at 10:05

## 2021-03-03 ASSESSMENT — REFRACTION_WEARINGRX
OD_SPHERE: -7.00
OD_CYLINDER: +0.75
OS_ADD: +2.00
OS_CYLINDER: +0.50
OS_AXIS: 044
OD_AXIS: 131
SPECS_TYPE: PAL
OS_SPHERE: -6.75
OD_ADD: +2.00

## 2021-03-03 ASSESSMENT — VISUAL ACUITY
OS_CC: 20/40
OD_CC+: -1
OD_CC: 20/30
CORRECTION_TYPE: GLASSES
METHOD: SNELLEN - LINEAR

## 2021-03-03 ASSESSMENT — CUP TO DISC RATIO
OS_RATIO: 0.3
OD_RATIO: 0.25

## 2021-03-03 ASSESSMENT — SLIT LAMP EXAM - LIDS
COMMENTS: NORMAL
COMMENTS: SMALL PAPILLOMA ALONG LL MARGIN

## 2021-03-03 ASSESSMENT — TONOMETRY
OS_IOP_MMHG: 13
IOP_METHOD: TONOPEN
OD_IOP_MMHG: 17

## 2021-03-03 ASSESSMENT — EXTERNAL EXAM - LEFT EYE: OS_EXAM: NORMAL

## 2021-03-03 ASSESSMENT — EXTERNAL EXAM - RIGHT EYE: OD_EXAM: NORMAL

## 2021-03-03 ASSESSMENT — CONF VISUAL FIELD
OD_NORMAL: 1
OS_NORMAL: 1

## 2021-03-03 NOTE — NURSING NOTE
Chief Complaints and History of Present Illnesses   Patient presents with     Diabetic Macular Edema Follow Up     3 month f/u severe nonproliferative diabetic retinopathy with diabetic macular edema of both eyes     Chief Complaint(s) and History of Present Illness(es)     Diabetic Macular Edema Follow Up     Laterality: both eyes    Onset: 3 months ago    Associated symptoms: floaters (noted at night - thinks BE) and itching.  Negative for flashes and eye pain    Pain scale: 0/10    Comments: 3 month f/u severe nonproliferative diabetic retinopathy with diabetic macular edema of both eyes              Comments     Pt feels vision is stable overall - has noted itching of the eyes over the past couple weeks (states allergies are kicking in)  Doesn't use any drops - says he ran out    Fasting BS this AM was 170  Lab Results       Component                Value               Date                       A1C                      6.0                 12/30/2020                 A1C                      6.3                 06/13/2020                 A1C                      6.6                 08/08/2018                 A1C                      6.5                 06/09/2017                 A1C                      7.8                 10/25/2016    DANG Soria 9:12 AM March 3, 2021

## 2021-03-03 NOTE — PROGRESS NOTES
CC:  Follow up Diabetic macular edema  left eye     HPI: Frandy Workman is a  56 year old year-old patient with history of Diabetes mellitus for 24 ys . Patient on insulin.  Patient was evaluated by dr. Amezquita and sent to the retina clinic for management of Diabetic macular edema     Interval History: vision left eye is blurry. No flashes and floaters   Hemoglobin A1C   Date Value Ref Range Status   12/30/2020 6.0 (H) 0 - 5.6 % Final     Comment:     Normal <5.7% Prediabetes 5.7-6.4%  Diabetes 6.5% or higher - adopted from ADA   consensus guidelines.         Retinal Imaging:  OCT 3-3-21  RE: mild Diabetic macular edema, stable. Vitreous attached  LE: increased Diabetic macular edema, mild Epiretinal membrane    fluorescein angiography transits right eye 11-25-20  Right eye: diffuse microaneurysms, late mild macular leakage; peripheral peripheral capillary non perfusion;   vessel leakage in late phase, no NVD/NVE vs early SN neovascularization elsewhere??  Left eye: diffuse microaneurysms, late mild macula leakage; mild peripheral capillary non perfusion;  mild vessel leakage in late phase, no NVD/NVE    Optos consistent with clinical exam    Assessment & Plan:  # Diabetic macular edema both eyes    - status post avastin x 4. Switch to Eylea 4/24/19 with improvement of Diabetic macular edema    - now with worsen Diabetic macular edema left eye    - Last Eylea injection was 3/2020    - recommend re-starting anti-vegf left eye    - plan for Eylea inj left eye and follow up in 2 months with Optical Coherence Tomography     # severe nonproliferative diabetic retinopathy of both eyes    - HbA1c 6.3% (6/2020)   - fluorescein angiography with increased peripheral leakage and capillary non perfusion compared to march, 2019   - Blood pressure (<120/80) and blood glucose (HbA1c <7.0, ~6.5 today) control discussed with patient. Patient advised that failure to adequately control each may lead to vision loss. The patient  expressed understanding.   - observe for now and could consider peripheral laser if worsening      # Myopia, bilateral  Prescription given today (10/9/19)     #. Senile nuclear sclerosis, bilateral  Comment: Not visually significant OU    # Dry eye syndrome   Left eye worse than right eye   warm compresses and artificial tears  As needed  -punctal plugs previously left eye - reports this is better     # Status post liver transplant   - tx 11/2019   - severe anemia; s/p transfusion - anemia much improved    - being seen by his primary team    Plan: follow up in 2 months with Optical Coherence Tomography and possi inj    Joni Kessler MD  Ophthalmology Resident, PGY-3    ~~~~~~~~~~~~~~~~~~~~~~~~~~~~~~~~~~   Complete documentation of historical and exam elements from today's encounter can be found in the full encounter summary report (not reduplicated in this progress note).  I personally obtained the chief complaint(s) and history of present illness.  I confirmed and edited as necessary the review of systems, past medical/surgical history, family history, social history, and examination findings as documented by others; and I examined the patient myself.  I personally reviewed the relevant tests, images, and reports as documented above.  I formulated and edited as necessary the assessment and plan and discussed the findings and management plan with the patient and family and No resident or fellow assisted with the procedures performed.  I performed the procedures myself.    Enriqueta Martin MD   of Ophthalmology.  Retina Service   Department of Ophthalmology and Visual Neurosciences   [facility]  Phone: [phone]   Fax: [phone]

## 2021-03-08 NOTE — PROGRESS NOTES
Frandy Workman  is being evaluated via a billable video visit.      How would you like to obtain your AVS? Mail a copy  For the video visit, send the invitation by: [email or web address]  Will anyone else be joining your video visit? No      Start 12:46  End: 12:02      Diabetes Virtual Consult Note  March 9, 2021      Frandy Workman is a 56 year old male with a past medical history including  has a past medical history of Anemia (2013), Arthritis, BPH (benign prostatic hyperplasia), CAD (coronary artery disease) (4/1/2019), Cholelithiasis, Conductive hearing loss (08/16/2017), Depressive disorder (1986), Gastroesophageal reflux disease (12/01/2014), HCC (hepatocellular carcinoma) (H) (1/22/2019), History of diabetic retinopathy (07/2018), HTN (hypertension), HTN (hypertension) (11/20/2019), Hyperlipidemia, Liver cirrhosis secondary to ESTRADA (H), Liver transplanted (H) (11/11/2019), Portal vein thrombosis (4/11/2019), and Type II diabetes mellitus (H). He also has no past medical history of Asymptomatic human immunodeficiency virus (HIV) infection status (H), Cerebral infarction (H), Congestive heart failure (H), COPD (chronic obstructive pulmonary disease) (H), History of blood transfusion, Infectious mononucleosis, PONV (postoperative nausea and vomiting), Thyroid disease, Tuberculosis, or Uncomplicated asthma. He also struggles with BPAD.      When I last saw Miller In January 2021, advised to increase his Tresiba to 28 units daily with a goal of fasting blood sugar 90-1 40 as advised to increase his Jardiance from 10 mg to 20 mg daily at the time of renewal of this prescription.  He also continued to complain of sweating night sweats and was awaiting a CT scheduled for March 26 as well as seeing psychiatry in regards to this.  We also attempted to adjust his camilo prescription as he was running out at the end of every month.  His average blood glucose at that time was 180.    Interim:  Per CGM S average blood glucose is  decreased from 180 at last visit now down to just 160 but we have very limited amount of data from just the evening of the fourth through this morning.  He did have low blood sugar after midnight the morning of the fifth though this has not recurred.    Pharmacy has advised there is no way to fix lapse in Benjamin.  He did note low on benjamin morning of  - he omitted piece of dark chocolate - but slept through without symptoms,  Has had false lows otherwise.      Needs DMV form.  Has never had lows while driving and checks while driving.  He keeps glucose tabs with always when driving, but has not had to use them.    He denies symptoms of lows with recent overnight on Benjamin, but notes at times does not seem accurate.  When fingerstick <80 he always has symptoms.      Dm Regimen:   - Tresiba 28 units /day.   -  carbohydrate coverage to 1 unit : 15 g of carbohydrate.   -  Correction Novolounit Novolo mg /dl >180     - Empagliflozin 10 mg daily.      We reviewed glucometer, pump and CGMS data together.  It revealed:            His avg BG has increased from 162 and he is not scanning as regularly. No hypoglycemia recorded, overnight BG appears reassuring.  Night sweats do not appears related to BG which is rather stable overnight generally.      History of Diabetes monitoring and complications/ prevention:  CAD: yes 2019  Last eye exam results:Treated for NPDMR - last seen 3/3/21  Microalbuminuria: yes - has CKD, not currently on Ace or ARB, sees Cardiology , Nephrology.  Is on Jardiance, understands to hold if possible dehydration.    HTN: past - on coreg  On statin: yes  On ASA:yes  Depression:yes  - sees psych  ED:did not inquire    Frandy's PMH reviewed with him.      Current Outpatient Medications   Medication     Acetaminophen (TYLENOL) 325 MG CAPS     ARIPiprazole (ABILIFY) 5 MG tablet     Artificial Tear Solution (SM ARTIFICIAL TEARS) SOLN     aspirin 81 MG EC tablet     BD VIKTORIA U/F 32G X 4 MM  insulin pen needle     carvedilol (COREG) 3.125 MG tablet     ciclopirox (LOPROX) 0.77 % cream     Continuous Blood Gluc  (FREESTYLE CLAUDIA 14 DAY READER) CECILIO     Continuous Blood Gluc Sensor (FREESTYLE CLAUDIA 14 DAY SENSOR) MISC     empagliflozin (JARDIANCE) 25 MG TABS tablet     glucose (BD GLUCOSE) 4 g chewable tablet     insulin aspart (NOVOLOG PEN) 100 UNIT/ML pen     insulin degludec (TRESIBA FLEXTOUCH) 100 UNIT/ML pen     lamiVUDine (EPIVIR) 100 MG tablet     Multiple Vitamin (THERAVITE PO)     order for DME     pantoprazole (PROTONIX) 40 MG EC tablet     polyethylene glycol (MIRALAX) 17 g packet     rosuvastatin (CRESTOR) 5 MG tablet     tacrolimus (GENERIC EQUIVALENT) 1 MG capsule     tamsulosin (FLOMAX) 0.4 MG capsule     vitamin B-12 (CYANOCOBALAMIN) 1000 MCG tablet     vitamin D3 (CHOLECALCIFEROL) 2000 units (50 mcg) tablet     zolpidem (AMBIEN) 10 MG tablet     Current Facility-Administered Medications   Medication     aflibercept (EYLEA) injection prefilled syringe 2 mg              Yehuda family history includes Asthma in his sister; Cancer in his father, maternal grandmother, and mother; Cerebrovascular Disease in his mother; Colon Cancer (age of onset: 60) in his father; Colorectal Cancer in his father, maternal grandmother, and paternal grandmother; Depression in his mother and sister; Diabetes in his mother; Glaucoma in his father; Macular Degeneration in his father; Pancreatic Cancer (age of onset: 60) in his father; Prostate Cancer in his father and maternal grandfather; Skin Cancer in his father; Substance Abuse in his maternal grandfather and maternal grandmother; Thyroid Disease in his mother and sister.    ROS:   Patient denies any fevers, chills or sweats as well as any changes in or problems with vision, pain or problems with dentition, new or different headaches.  Patient denies symptoms of hypo and hyperglycemia except as above.   Patients denies marked fatigue, cough, shortness  "of breath, chest pain or pressure.  There has been no pain with or other changes in urination or  itching or pain in genital areas.  Patient denies any noted swelling in feet, ankles or otherwise, loss of sensation or pain  in feet or other areas.    Patient also denies current difficulties with depressed mood, anhedonia or worrying too much.        Exam:    PHONE VISIT        Wt Readings from Last 10 Encounters:   10/14/20 78 kg (171 lb 14.4 oz)   09/30/20 78.8 kg (173 lb 12.8 oz)   09/25/20 (P) 79 kg (174 lb 1.6 oz)   09/16/20 77.6 kg (171 lb)   08/19/20 76.9 kg (169 lb 8 oz)   08/12/20 76.8 kg (169 lb 4.8 oz)   07/29/20 75.6 kg (166 lb 9.6 oz)   07/28/20 75.8 kg (167 lb)   07/15/20 73.4 kg (161 lb 12.8 oz)   07/01/20 70.3 kg (155 lb)         Frandy is alerted and oriented.  Mood is \"good,\" affect is congruent.  Thoughtful form and content are fluid and coherent.  No signs of distress are appreciated.    GENERAL: Healthy, alert and no distress.  Clean shaven.    EYES: Eyes grossly normal to inspection.  No discharge or erythema, or obvious scleral/conjunctival abnormalities.  RESP: No audible wheeze, cough, or visible cyanosis.  No visible retractions or increased work of breathing.    SKIN: Visible skin clear. No significant rash, abnormal pigmentation or lesions.  NEURO: Cranial nerves grossly intact.  Mentation and speech appropriate for age.  PSYCH: Mentation appears normal, affect normal/bright, judgement and insight intact, normal speech and appearance well-groomed.      Data:      Most recent:  Lab Results   Component Value Date    CR 1.95 10/14/2020     Lab Results   Component Value Date     10/14/2020      Anion Gap 3 - 14 mmol/L 4     Glucose 70 - 99 mg/dL 147High      Urea Nitrogen 7 - 30 mg/dL 39High      Creatinine 0.66 - 1.25 mg/dL 1.62High      GFR Estimate >60 mL/min/1.73_m2 46Low       On 1/20/21.      Lab Results   Component Value Date    A1C 6.0 (H) 12/30/2020    A1C 6.3 (H) 06/13/2020    " A1C 6.6 (H) 08/08/2018    A1C 6.5 (H) 06/09/2017    A1C 7.8 (H) 10/25/2016    HEMOGLOBINA1 4.8 03/04/2020    HEMOGLOBINA1 5.1 12/02/2019    HEMOGLOBINA1 5.4 08/14/2019    HEMOGLOBINA1 6.1 (A) 02/11/2019    HEMOGLOBINA1 8.1 (A) 04/11/2018     Lab Results   Component Value Date    MICROL 196 06/29/2020       No results found for: CPEPT, GADAB, ISCAB  Cholesterol   Date Value Ref Range Status   09/25/2020 146 <200 mg/dL Final   07/29/2020 192 <200 mg/dL Final     HDL Cholesterol   Date Value Ref Range Status   09/25/2020 39 (L) >39 mg/dL Final   07/29/2020 39 (L) >39 mg/dL Final     LDL Cholesterol Calculated   Date Value Ref Range Status   09/25/2020 61 <100 mg/dL Final     Comment:     Desirable:       <100 mg/dl   07/29/2020 117 (H) <100 mg/dL Final     Comment:     Above desirable:  100-129 mg/dl  Borderline High:  130-159 mg/dL  High:             160-189 mg/dL  Very high:       >189 mg/dl       Triglycerides   Date Value Ref Range Status   09/25/2020 228 (H) <150 mg/dL Final     Comment:     Borderline high:  150-199 mg/dl  High:             200-499 mg/dl  Very high:       >499 mg/dl     07/29/2020 181 (H) <150 mg/dL Final     Comment:     Borderline high:  150-199 mg/dl  High:             200-499 mg/dl  Very high:       >499 mg/dl       No results found for: CHOLHDLRATIO      GFR Estimate   Date Value Ref Range Status   02/26/2021 40 (L) >60 mL/min/[1.73_m2] Final     Comment:     Non  GFR Calc  Starting 12/18/2018, serum creatinine based estimated GFR (eGFR) will be   calculated using the Chronic Kidney Disease Epidemiology Collaboration   (CKD-EPI) equation.     01/20/2021 46 (L) >60 mL/min/[1.73_m2] Final     Comment:     Non  GFR Calc  Starting 12/18/2018, serum creatinine based estimated GFR (eGFR) will be   calculated using the Chronic Kidney Disease Epidemiology Collaboration   (CKD-EPI) equation.     12/30/2020 41 (L) >60 mL/min/[1.73_m2] Final     Comment:     Non   American GFR Calc  Starting 2018, serum creatinine based estimated GFR (eGFR) will be   calculated using the Chronic Kidney Disease Epidemiology Collaboration   (CKD-EPI) equation.         TSH   Date Value Ref Range Status   2021 0.91 0.40 - 4.00 mU/L Final          CBC BMP Hep panel from 21 stable, reassuring.    Awaiting CT 3/26          Assessment/Plan:    Frandy is a 55 year old male with  has a past medical history of Anemia (), Arthritis, BPH (benign prostatic hyperplasia), CAD (coronary artery disease) (2019), Cholelithiasis, Conductive hearing loss (2017), Depressive disorder (), Gastroesophageal reflux disease (2014), HCC (hepatocellular carcinoma) (H) (2019), History of diabetic retinopathy (2018), HTN (hypertension), HTN (hypertension) (2019), Hyperlipidemia, Liver cirrhosis secondary to ESTRADA (H), Liver transplanted (H) (2019), Portal vein thrombosis (2019), and Type II diabetes mellitus (H). He also has no past medical history of Asymptomatic human immunodeficiency virus (HIV) infection status (H), Cerebral infarction (H), Congestive heart failure (H), COPD (chronic obstructive pulmonary disease) (H), History of blood transfusion, Infectious mononucleosis, PONV (postoperative nausea and vomiting), Thyroid disease, Tuberculosis, or Uncomplicated asthma.      1. DM2, Blood glucose remains slightly above goal.  Discussed options, r/B.  Miller will continue to work on dite, activity, some stength exercises to maintain muscle mass.     Recommend continue:   -  carbohydrate coverage to 1 unit : 15 g of carbohydrate.  Plans to do more consistently. Communicating with pharmacy to problem shoot lapses in Benjamin coverage as he gets out of Novolog sliding scale insulin habit end of each month and hard to resume.  Rewriting rx for 9 sensors q 3 months.     -  Correction Novolounit Novolo mg /dl >150     - Empagliflozin 10 mg daily.  Exercise as  able early in day.  Reccomend a mix of weight bearing and aerobic exercise to meet ADA recommendations.      Consider nutrition heart healthy diet, weight maintenance diet.      2. DM Complications-     Lifestyle modification focusing on application of a Mediterranean diet or Dietary Approaches to Stop Hypertension (DASH) dietary pattern; reduction of saturated fat and trans fat; increase of dietary n-3 fatty acids, viscous fiber, and plant stanols/sterols intake; and increased physical activity recommended to improve the lipid profile and reduce the risk of developing atherosclerotic cardiovascular disease.     Retinopathy:  Yes.  Seeing retina specialist.    Nephropathy:  No current elevated BP.  Creatinine stable. Consider ACe I / ARB. Continue Jardiance.   Neuropathy: No.    Feet: OK, no ulcers or lesions.   Lipids:   Patient taking a statin.    3. Night sweats:  He will get CT later this month, follow-up up with primary care, oncology and psychiatry.      45 minutes in preparation for visit reviewing chart, labs and documentation, visiting with patient gathering history and in exam, education and counseling, as well as coordination of care, further chart review and documentation following visit on this date of service and as alluded to documented above.         It is my privilege to be involved in the care of the above patient.     Tish Toscano PA-C, MPAS  [facility]  Diabetes, Endocrinology, and Metabolism  [phone] Appointments/Nurse  [phone] pager  [phone] nurse line    This note was completed in part using Dragon voice recognition, and may contain word and grammatical errors.

## 2021-03-09 ENCOUNTER — VIRTUAL VISIT (OUTPATIENT)
Dept: ENDOCRINOLOGY | Facility: CLINIC | Age: 56
End: 2021-03-09
Payer: MEDICARE

## 2021-03-09 DIAGNOSIS — E11.3293 TYPE 2 DIABETES MELLITUS WITH MILD NONPROLIFERATIVE RETINOPATHY OF BOTH EYES WITHOUT MACULAR EDEMA, UNSPECIFIED WHETHER LONG TERM INSULIN USE (H): ICD-10-CM

## 2021-03-09 DIAGNOSIS — R61 NIGHT SWEATS: ICD-10-CM

## 2021-03-09 DIAGNOSIS — Z94.4 LIVER TRANSPLANT STATUS (H): Primary | ICD-10-CM

## 2021-03-09 PROCEDURE — 99214 OFFICE O/P EST MOD 30 MIN: CPT | Mod: 95 | Performed by: PHYSICIAN ASSISTANT

## 2021-03-09 NOTE — PATIENT INSTRUCTIONS
Please keep up your good work.    BG looks good, please fax DMV form to me.    My best wishes,    Tish Toscano PA-C, MPAS  [facility]  Diabetes, Endocrinology, and Metabolism  [phone] Appointments/Nurse  [phone] Fax  [phone] URGENTafter hours/weekend Endocrinologist on call

## 2021-03-09 NOTE — LETTER
3/9/2021       RE: Frandy Workman  [address] [facility] [address]     Dear Colleague,    Thank you for referring your patient, Frandy Workman, to the [facility] [place] at [facility]. Please see a copy of my visit note below.      Frandy Workman  is being evaluated via a billable video visit.      How would you like to obtain your AVS? Mail a copy  For the video visit, send the invitation by: [email or web address]  Will anyone else be joining your video visit? No      Start 12:46  End: 12:02      Diabetes Virtual Consult Note  March 9, 2021      Frandy Workman is a 56 year old male with a past medical history including  has a past medical history of Anemia (2013), Arthritis, BPH (benign prostatic hyperplasia), CAD (coronary artery disease) (4/1/2019), Cholelithiasis, Conductive hearing loss (08/16/2017), Depressive disorder (1986), Gastroesophageal reflux disease (12/01/2014), HCC (hepatocellular carcinoma) (H) (1/22/2019), History of diabetic retinopathy (07/2018), HTN (hypertension), HTN (hypertension) (11/20/2019), Hyperlipidemia, Liver cirrhosis secondary to ESTRADA (H), Liver transplanted (H) (11/11/2019), Portal vein thrombosis (4/11/2019), and Type II diabetes mellitus (H). He also has no past medical history of Asymptomatic human immunodeficiency virus (HIV) infection status (H), Cerebral infarction (H), Congestive heart failure (H), COPD (chronic obstructive pulmonary disease) (H), History of blood transfusion, Infectious mononucleosis, PONV (postoperative nausea and vomiting), Thyroid disease, Tuberculosis, or Uncomplicated asthma. He also struggles with BPAD.      When I last saw Miller In January 2021, advised to increase his Tresiba to 28 units daily with a goal of fasting blood sugar 90-1 40 as advised to increase his Jardiance from 10 mg to 20 mg daily at the time of renewal of this prescription.  He also continued to complain  of sweating night sweats and was awaiting a CT scheduled for  as well as seeing psychiatry in regards to this.  We also attempted to adjust his benjamin prescription as he was running out at the end of every month.  His average blood glucose at that time was 180.    Interim:  Per CGM S average blood glucose is decreased from 180 at last visit now down to just 160 but we have very limited amount of data from just the evening of the fourth through this morning.  He did have low blood sugar after midnight the morning of the fifth though this has not recurred.    Pharmacy has advised there is no way to fix lapse in Benjamin.  He did note low on benjamin morning of  - he omitted piece of dark chocolate - but slept through without symptoms,  Has had false lows otherwise.      Needs DMV form.  Has never had lows while driving and checks while driving.  He keeps glucose tabs with always when driving, but has not had to use them.    He denies symptoms of lows with recent overnight on Benjamin, but notes at times does not seem accurate.  When fingerstick <80 he always has symptoms.      Dm Regimen:   - Tresiba 28 units /day.   -  carbohydrate coverage to 1 unit : 15 g of carbohydrate.   -  Correction Novolounit Novolo mg /dl >180     - Empagliflozin 10 mg daily.      We reviewed glucometer, pump and CGMS data together.  It revealed:            His avg BG has increased from 162 and he is not scanning as regularly. No hypoglycemia recorded, overnight BG appears reassuring.  Night sweats do not appears related to BG which is rather stable overnight generally.      History of Diabetes monitoring and complications/ prevention:  CAD: yes 2019  Last eye exam results:Treated for NPDMR - last seen 3/3/21  Microalbuminuria: yes - has CKD, not currently on Ace or ARB, sees Cardiology , Nephrology.  Is on Jardiance, understands to hold if possible dehydration.    HTN: past - on coreg  On statin: yes  On  ASA:yes  Depression:yes  - sees psych  ED:did not inquire    Frandy's PMH reviewed with him.      Current Outpatient Medications   Medication     Acetaminophen (TYLENOL) 325 MG CAPS     ARIPiprazole (ABILIFY) 5 MG tablet     Artificial Tear Solution (SM ARTIFICIAL TEARS) SOLN     aspirin 81 MG EC tablet     BD VIKTORIA U/F 32G X 4 MM insulin pen needle     carvedilol (COREG) 3.125 MG tablet     ciclopirox (LOPROX) 0.77 % cream     Continuous Blood Gluc  (FREESTYLE CLAUDIA 14 DAY READER) CECILIO     Continuous Blood Gluc Sensor (FREESTYLE CLAUDIA 14 DAY SENSOR) MISC     empagliflozin (JARDIANCE) 25 MG TABS tablet     glucose (BD GLUCOSE) 4 g chewable tablet     insulin aspart (NOVOLOG PEN) 100 UNIT/ML pen     insulin degludec (TRESIBA FLEXTOUCH) 100 UNIT/ML pen     lamiVUDine (EPIVIR) 100 MG tablet     Multiple Vitamin (THERAVITE PO)     order for DME     pantoprazole (PROTONIX) 40 MG EC tablet     polyethylene glycol (MIRALAX) 17 g packet     rosuvastatin (CRESTOR) 5 MG tablet     tacrolimus (GENERIC EQUIVALENT) 1 MG capsule     tamsulosin (FLOMAX) 0.4 MG capsule     vitamin B-12 (CYANOCOBALAMIN) 1000 MCG tablet     vitamin D3 (CHOLECALCIFEROL) 2000 units (50 mcg) tablet     zolpidem (AMBIEN) 10 MG tablet     Current Facility-Administered Medications   Medication     aflibercept (EYLEA) injection prefilled syringe 2 mg              Tariqs family history includes Asthma in his sister; Cancer in his father, maternal grandmother, and mother; Cerebrovascular Disease in his mother; Colon Cancer (age of onset: 60) in his father; Colorectal Cancer in his father, maternal grandmother, and paternal grandmother; Depression in his mother and sister; Diabetes in his mother; Glaucoma in his father; Macular Degeneration in his father; Pancreatic Cancer (age of onset: 60) in his father; Prostate Cancer in his father and maternal grandfather; Skin Cancer in his father; Substance Abuse in his maternal grandfather and maternal  "grandmother; Thyroid Disease in his mother and sister.    ROS:   Patient denies any fevers, chills or sweats as well as any changes in or problems with vision, pain or problems with dentition, new or different headaches.  Patient denies symptoms of hypo and hyperglycemia except as above.   Patients denies marked fatigue, cough, shortness of breath, chest pain or pressure.  There has been no pain with or other changes in urination or  itching or pain in genital areas.  Patient denies any noted swelling in feet, ankles or otherwise, loss of sensation or pain  in feet or other areas.    Patient also denies current difficulties with depressed mood, anhedonia or worrying too much.        Exam:    PHONE VISIT        Wt Readings from Last 10 Encounters:   10/14/20 78 kg (171 lb 14.4 oz)   09/30/20 78.8 kg (173 lb 12.8 oz)   09/25/20 (P) 79 kg (174 lb 1.6 oz)   09/16/20 77.6 kg (171 lb)   08/19/20 76.9 kg (169 lb 8 oz)   08/12/20 76.8 kg (169 lb 4.8 oz)   07/29/20 75.6 kg (166 lb 9.6 oz)   07/28/20 75.8 kg (167 lb)   07/15/20 73.4 kg (161 lb 12.8 oz)   07/01/20 70.3 kg (155 lb)         Frandy is alerted and oriented.  Mood is \"good,\" affect is congruent.  Thoughtful form and content are fluid and coherent.  No signs of distress are appreciated.    GENERAL: Healthy, alert and no distress.  Clean shaven.    EYES: Eyes grossly normal to inspection.  No discharge or erythema, or obvious scleral/conjunctival abnormalities.  RESP: No audible wheeze, cough, or visible cyanosis.  No visible retractions or increased work of breathing.    SKIN: Visible skin clear. No significant rash, abnormal pigmentation or lesions.  NEURO: Cranial nerves grossly intact.  Mentation and speech appropriate for age.  PSYCH: Mentation appears normal, affect normal/bright, judgement and insight intact, normal speech and appearance well-groomed.      Data:      Most recent:  Lab Results   Component Value Date    CR 1.95 10/14/2020     Lab Results "   Component Value Date     10/14/2020      Anion Gap 3 - 14 mmol/L 4     Glucose 70 - 99 mg/dL 147High      Urea Nitrogen 7 - 30 mg/dL 39High      Creatinine 0.66 - 1.25 mg/dL 1.62High      GFR Estimate >60 mL/min/1.73_m2 46Low       On 1/20/21.      Lab Results   Component Value Date    A1C 6.0 (H) 12/30/2020    A1C 6.3 (H) 06/13/2020    A1C 6.6 (H) 08/08/2018    A1C 6.5 (H) 06/09/2017    A1C 7.8 (H) 10/25/2016    HEMOGLOBINA1 4.8 03/04/2020    HEMOGLOBINA1 5.1 12/02/2019    HEMOGLOBINA1 5.4 08/14/2019    HEMOGLOBINA1 6.1 (A) 02/11/2019    HEMOGLOBINA1 8.1 (A) 04/11/2018     Lab Results   Component Value Date    MICROL 196 06/29/2020       No results found for: CPEPT, GADAB, ISCAB  Cholesterol   Date Value Ref Range Status   09/25/2020 146 <200 mg/dL Final   07/29/2020 192 <200 mg/dL Final     HDL Cholesterol   Date Value Ref Range Status   09/25/2020 39 (L) >39 mg/dL Final   07/29/2020 39 (L) >39 mg/dL Final     LDL Cholesterol Calculated   Date Value Ref Range Status   09/25/2020 61 <100 mg/dL Final     Comment:     Desirable:       <100 mg/dl   07/29/2020 117 (H) <100 mg/dL Final     Comment:     Above desirable:  100-129 mg/dl  Borderline High:  130-159 mg/dL  High:             160-189 mg/dL  Very high:       >189 mg/dl       Triglycerides   Date Value Ref Range Status   09/25/2020 228 (H) <150 mg/dL Final     Comment:     Borderline high:  150-199 mg/dl  High:             200-499 mg/dl  Very high:       >499 mg/dl     07/29/2020 181 (H) <150 mg/dL Final     Comment:     Borderline high:  150-199 mg/dl  High:             200-499 mg/dl  Very high:       >499 mg/dl       No results found for: CHOLHDLRATIO      GFR Estimate   Date Value Ref Range Status   02/26/2021 40 (L) >60 mL/min/[1.73_m2] Final     Comment:     Non  GFR Calc  Starting 12/18/2018, serum creatinine based estimated GFR (eGFR) will be   calculated using the Chronic Kidney Disease Epidemiology Collaboration   (CKD-EPI)  equation.     01/20/2021 46 (L) >60 mL/min/[1.73_m2] Final     Comment:     Non  GFR Calc  Starting 12/18/2018, serum creatinine based estimated GFR (eGFR) will be   calculated using the Chronic Kidney Disease Epidemiology Collaboration   (CKD-EPI) equation.     12/30/2020 41 (L) >60 mL/min/[1.73_m2] Final     Comment:     Non  GFR Calc  Starting 12/18/2018, serum creatinine based estimated GFR (eGFR) will be   calculated using the Chronic Kidney Disease Epidemiology Collaboration   (CKD-EPI) equation.         TSH   Date Value Ref Range Status   01/28/2021 0.91 0.40 - 4.00 mU/L Final          CBC BMP Hep panel from 2/26/21 stable, reassuring.    Awaiting CT 3/26          Assessment/Plan:    Frandy is a 55 year old male with  has a past medical history of Anemia (2013), Arthritis, BPH (benign prostatic hyperplasia), CAD (coronary artery disease) (4/1/2019), Cholelithiasis, Conductive hearing loss (08/16/2017), Depressive disorder (1986), Gastroesophageal reflux disease (12/01/2014), HCC (hepatocellular carcinoma) (H) (1/22/2019), History of diabetic retinopathy (07/2018), HTN (hypertension), HTN (hypertension) (11/20/2019), Hyperlipidemia, Liver cirrhosis secondary to ESTRADA (H), Liver transplanted (H) (11/11/2019), Portal vein thrombosis (4/11/2019), and Type II diabetes mellitus (H). He also has no past medical history of Asymptomatic human immunodeficiency virus (HIV) infection status (H), Cerebral infarction (H), Congestive heart failure (H), COPD (chronic obstructive pulmonary disease) (H), History of blood transfusion, Infectious mononucleosis, PONV (postoperative nausea and vomiting), Thyroid disease, Tuberculosis, or Uncomplicated asthma.      1. DM2, Blood glucose remains slightly above goal.  Discussed options, r/B.  Miller will continue to work on dite, activity, some stength exercises to maintain muscle mass.     Recommend continue:   -  carbohydrate coverage to 1 unit : 15 g of  carbohydrate.  Plans to do more consistently. Communicating with pharmacy to problem shoot lapses in Benjamin coverage as he gets out of Novolog sliding scale insulin habit end of each month and hard to resume.  Rewriting rx for 9 sensors q 3 months.     -  Correction Novolounit Novolo mg /dl >150     - Empagliflozin 10 mg daily.  Exercise as able early in day.  Reccomend a mix of weight bearing and aerobic exercise to meet ADA recommendations.      Consider nutrition heart healthy diet, weight maintenance diet.      2. DM Complications-     Lifestyle modification focusing on application of a Mediterranean diet or Dietary Approaches to Stop Hypertension (DASH) dietary pattern; reduction of saturated fat and trans fat; increase of dietary n-3 fatty acids, viscous fiber, and plant stanols/sterols intake; and increased physical activity recommended to improve the lipid profile and reduce the risk of developing atherosclerotic cardiovascular disease.     Retinopathy:  Yes.  Seeing retina specialist.    Nephropathy:  No current elevated BP.  Creatinine stable. Consider ACe I / ARB. Continue Jardiance.   Neuropathy: No.    Feet: OK, no ulcers or lesions.   Lipids:   Patient taking a statin.    3. Night sweats:  He will get CT later this month, follow-up up with primary care, oncology and psychiatry.      45 minutes in preparation for visit reviewing chart, labs and documentation, visiting with patient gathering history and in exam, education and counseling, as well as coordination of care, further chart review and documentation following visit on this date of service and as alluded to documented above.         It is my privilege to be involved in the care of the above patient.     Tish Toscano PA-C, MPAS  [facility]  Diabetes, Endocrinology, and Metabolism  [phone] Appointments/Nurse  [phone] pager  [phone] nurse line    This note was completed in part using Dragon voice  recognition, and may contain word and grammatical errors.

## 2021-03-11 ENCOUNTER — DOCUMENTATION ONLY (OUTPATIENT)
Dept: ENDOCRINOLOGY | Facility: CLINIC | Age: 56
End: 2021-03-11

## 2021-03-15 ENCOUNTER — IMMUNIZATION (OUTPATIENT)
Dept: NURSING | Facility: CLINIC | Age: 56
End: 2021-03-15
Payer: MEDICARE

## 2021-03-15 PROCEDURE — 0001A PR COVID VAC PFIZER DIL RECON 30 MCG/0.3 ML IM: CPT

## 2021-03-15 PROCEDURE — 91300 PR COVID VAC PFIZER DIL RECON 30 MCG/0.3 ML IM: CPT

## 2021-03-18 ENCOUNTER — VIRTUAL VISIT (OUTPATIENT)
Dept: BEHAVIORAL HEALTH | Facility: CLINIC | Age: 56
End: 2021-03-18
Payer: MEDICARE

## 2021-03-18 DIAGNOSIS — F31.31 BIPOLAR 1 DISORDER, DEPRESSED, MILD (H): Primary | ICD-10-CM

## 2021-03-18 PROCEDURE — 90834 PSYTX W PT 45 MINUTES: CPT | Mod: 95 | Performed by: PSYCHOLOGIST

## 2021-03-18 NOTE — PROGRESS NOTES
[facility]: Integrated Behavioral Health  March 18, 2021      Behavioral Health Clinician Progress Note    Patient Name: Frandy Workman           Service Type: Video visit      Service Location:  Video visit      Session Start Time: 2:07  Session End Time: 2:45      Session Length: 38 - 52      Attendees: Patient    Visit Activities (Refresh list every visit): [facility] Only     Telemedicine Visit: The patient's condition can be safely assessed and treated via synchronous audio and visual telemedicine encounter.      Reason for Telemedicine Visit: COVID-19    Originating Site (Patient Location): Patient's home    Distant Site (Provider Location): Provider Remote Setting    Consent:  The patient/guardian has verbally consented to: the potential risks and benefits of telemedicine (video visit) versus in person care; bill my insurance or make self-payment for services provided; and responsibility for payment of non-covered services.     Mode of Communication:  Video Conference via [email or web address]    As the provider I attest to compliance with applicable laws and regulations related to telemedicine.    Diagnostic Assessment Date: 12/03/2020  Treatment Plan Review Date:  3/29/2021  See Flowsheets for today's PHQ-9 and LIZZETTE-7 results  Previous PHQ-9:   PHQ-9 SCORE 1/9/2020 10/13/2020 12/29/2020   PHQ-9 Total Score MyChart - 8 (Mild depression) 5 (Mild depression)   PHQ-9 Total Score 16 8 5     Previous LIZZETTE-7:   LIZZETTE-7 SCORE 10/13/2020 12/29/2020   Total Score 6 (mild anxiety) 8 (mild anxiety)   Total Score 6 8       HEATH LEVEL:  No flowsheet data found.    DATA  Extended Session (60+ minutes): No  Interactive Complexity: No  Crisis: No    Treatment Objective(s) Addressed in This Session:  Target Behavior(s): disease management/lifestyle changes related to depressive and anxiety symptoms    Depression and anxious distress management.    Current Stressors / Issues:  Miller shared updates about several  "areas of stress for him during our visit today. He shared that matters with [employer] seem resolved for now; he received his latest disability payment for March and believes he is covered at least through April. He will have to complete an in-person visit with them in May, but is okay with this. He also received the first round of his COVID-19 vaccine and feels some relief from having this and plans to have the second shot in three weeks. Miller also shared that, unlike last year, he submitted his tax documents to his CPA. Talked about how different this year was for him in this regard, as last year he waited a long time before submitting his documents and had more anxiety about the process. Explored how completing tasks and being proactive with his taxes, among other tasks, make him feel regarding his depression and anxiety. Talked about ways he could give himself \"credit\" for his hard work.     Miller is also considering exercising more and we talked about how he begin this again. He is considering using the recreation center at his building complex to help with this. We talked more about the benefits of behavioral activation and mild exercise in managing anxiety and depression for him. Explored how he could continue to add structure to his day to continue managing his depressed mood.     We will keep meeting every few weeks so he has support and help managing his depression symptoms.     Progress on Treatment Objective(s) / Homework:  Satisfactory progress - ACTION (Actively working towards change); Intervened by reinforcing change plan / affirming steps taken    Answers for HPI/ROS submitted by the patient on 10/13/2020   If you checked off any problems, how difficult have these problems made it for you to do your work, take care of things at home, or get along with other people?: Somewhat difficult  PHQ9 TOTAL SCORE: 8*  LIZZETTE 7 TOTAL SCORE: 6    *No SI     Answers for HPI/ROS submitted by the patient on 12/29/2020   If you " checked off any problems, how difficult have these problems made it for you to do your work, take care of things at home, or get along with other people?: Somewhat difficult  PHQ9 TOTAL SCORE: 5*  LIZZETTE 7 TOTAL SCORE: 8    *No SI     Supportive counseling used    Solution-focused therapy used      Care Plan review completed: not today    Medication Review:  No changes to current psychiatric medication(s)     Medication Compliance:  Yes    Changes in Health Issues:   None reported    Chemical Use Review:   Substance Use: Chemical use reviewed, no active concerns identified      Tobacco Use: No current tobacco use.      Assessment: Current Emotional / Mental Status (status of significant symptoms):  Risk status (Self / Other harm or suicidal ideation)  Patient denies a history of suicidal ideation, suicide attempts, self-injurious behavior, homicidal ideation, homicidal behavior and and other safety concerns     Patient denies current fears or concerns for personal safety.  Patient denies current or recent suicidal ideation or behaviors.  Patient denies current or recent homicidal ideation or behaviors.  Patient denies current or recent self injurious behavior or ideation.  Patient denies other safety concerns.     A safety and risk management plan has not been developed at this time, however patient was encouraged to call [place] [phone] should there be a change in any of these risk factors.    [place] Suicide Severity Rating Scale (Short Version)  [place] Suicide Severity Rating (Short Version) 1/22/2019 1/24/2019 11/10/2019 12/2/2019 12/10/2019 6/11/2020 7/1/2020   Over the past 2 weeks have you felt down, depressed, or hopeless? - - no yes yes no no   Over the past 2 weeks have you had thoughts of killing yourself? - - no yes no no no   Comments - - - lots of stressors, has thought about not taking meds - - -   Have you ever attempted to kill yourself? - - no no no no no   Q1 Wished to be Dead (Past Month) no  no - other (see comments) no - -   Comments - - - why did i do this surgery - - -   Q2 Suicidal Thoughts (Past Month) no no - no no - -   Comments - - - wishes he wouldn't have gone through surgery - - -   Q3 Suicidal Thought Method - - - no no - -   Q4 Suicidal Intent without Specific Plan - - - no no - -   Q5 Suicide Intent with Specific Plan - - - no no - -   Q6 Suicide Behavior (Lifetime) no no - no no - -         Appearance:   Appropriate   Eye Contact:   Good   Psychomotor Behavior: Restless   Attitude:   Cooperative  Interested Friendly Pleasant  Orientation:   All  Speech   Rate / Production: Normal/ Responsive   Volume:  Normal   Mood:    Anxious  Depressed   Affect:    Appropriate    Thought Content:  Clear   Thought Form:  Goal Directed  Logical   Insight:    Good     Diagnoses:  1. Bipolar 1 disorder, depressed, mild (H)          Collateral Reports Completed:  Not Applicable    Plan: (Homework, other):  Continue with this writer for individual psychotherapy in two weeks.  Continue medication regimen. Begin mild exercise (as directed by physician). Avoid mood-altering substances.     Joseph Murillo, RED  3/18/2021      ______________________________________________________________________    [facility] Integrated Behavioral Health Treatment Plan    Client's Name: Frandy Workman  YOB: 1965    Date: 12/29/2020    DSM-V Diagnoses: 296.51 Bipolar I Disorder Current or Most Recent Episode Depressed, Mild;     Clinical Global Impressions  First:  Considering your total clinical experience with this particular patient population, how severe are the patient's symptoms at this time?: 4 (12/18/2020  2:22 PM)      Most recent:  Compared to the patient's condition at the START of treatment, this patient's condition is: 2 (12/18/2020  2:22 PM)          Referral / Collaboration:  Will collaborate with care team as indicated during treatment.    Anticipated number of session or this episode of care:  10+      MeasurableTreatment Goal(s) related to diagnosis / functional impairment(s)  Goal 1:  Patient will experience a reduction in depressive and anxious symptoms, along with a corresponding increase in positive emotion and life satisfaction.    Objective #A: Patient will experience a reduction in depressed mood, will develop more effective coping skills to manage depressive symptoms, will develop healthy cognitive patterns and beliefs, will increase ability to function adaptively and will continue to take medications as prescribed / participate in supportive activities and services    Status: Continued - Date(s): 12/29/2020    Objective #B: Patient will experience a reduction in anxiety, will develop more effective coping skills to manage anxiety symptoms, will develop healthy cognitive patterns and beliefs and will increase ability to function adaptively  Status: Continued - Date(s):12/29/2020    Objective #C: Patient will develop better understanding of triggers and coping strategies to stabilize mood  Status: Continued - Date(s): 12/29/2020    Goal 2:  Patient will identify and increase engagement in valued activity, i.e. improving social connections/relationships, pursuing occupational goals or personally meaningful pursuits, exploration of meaning in life.     Objective #A: Patient will identify meaningful activity in social, occupational and  personal goals, and increase behavioral activation around these goals   Status: Continued - Date(s): 12/29/2020    Objective #B: Patient will address relationship difficulties in a more adaptive manner  Status: Continued - Date(s): 12/29/2020    Objective #C: Patient will develop coping/problem-solving skills to facilitate more adaptive adjustment and will effectively address problems that interfere with adaptive functioning  Status: Continued - Date(s): 12/29/2020        Possible Therapeutic Intervention(s)  Psycho-education regarding mental health diagnoses and  treatment options    Skills training    Explore skills useful to client in current situation.    Skills include assertiveness, communication, conflict management, problem-solving, relaxation, etc.    Solution-Focused Therapy    Explore patterns in patient's relationships and discuss options for new behaviors.    Explore patterns in patient's actions and choices and discuss options for new behaviors.    Cognitive-behavioral Therapy    Discuss common cognitive distortions, identify them in patient's life.    Explore ways to challenge, replace, and act against these cognitions.    Acceptance and Commitment Therapy    Explore and identify important values in patient's life.    Discuss ways to commit to behavioral activation around these values.    Psychodynamic psychotherapy    Discuss patient's emotional dynamics and issues and how they impact behaviors.    Explore patient's history of relationships and how they impact present behaviors.    Explore how to work with and make changes in these schemas and patterns.    Narrative Therapy    Explore the patient's story of his/her life from his/her perspective.    Explore alternate ways of understanding their experience, identifying exceptions, developing new themes.    Interpersonal Psychotherapy    Explore patterns in relationships that are effective or ineffective at helping patient reach their goals, find satisfying experience.    Discuss new patterns or behaviors to engage in for improved social functioning.    Behavioral Activation    Discuss steps patient can take to become more involved in meaningful activity.    Identify barriers to these activities and explore possible solutions.    Mindfulness-Based Strategies    Discuss skills based on development and application of mindfulness.    Skills drawn from compassion-focused therapy, dialectical behavior therapy, mindfulness-based stress reduction, mindfulness-based cognitive therapy, etc.      We have developed these  goals together during our work to this point. Patient has assisted in the development of these goals and has agreed to this treatment plan.       Joseph Murillo, LP  December 29, 2020

## 2021-03-26 ENCOUNTER — ANCILLARY PROCEDURE (OUTPATIENT)
Dept: CT IMAGING | Facility: CLINIC | Age: 56
End: 2021-03-26
Attending: INTERNAL MEDICINE
Payer: MEDICARE

## 2021-03-26 DIAGNOSIS — C22.0 HCC (HEPATOCELLULAR CARCINOMA) (H): ICD-10-CM

## 2021-03-26 DIAGNOSIS — Z94.4 LIVER TRANSPLANT RECIPIENT (H): ICD-10-CM

## 2021-03-26 DIAGNOSIS — Z94.4 LIVER REPLACED BY TRANSPLANT (H): ICD-10-CM

## 2021-03-26 LAB
AFP SERPL-MCNC: <1.5 UG/L (ref 0–8)
ALBUMIN SERPL-MCNC: 4 G/DL (ref 3.4–5)
ALP SERPL-CCNC: 138 U/L (ref 40–150)
ALT SERPL W P-5'-P-CCNC: 33 U/L (ref 0–70)
ANION GAP SERPL CALCULATED.3IONS-SCNC: 4 MMOL/L (ref 3–14)
AST SERPL W P-5'-P-CCNC: 16 U/L (ref 0–45)
BASOPHILS # BLD AUTO: 0 10E9/L (ref 0–0.2)
BASOPHILS NFR BLD AUTO: 0.4 %
BILIRUB DIRECT SERPL-MCNC: 0.2 MG/DL (ref 0–0.2)
BILIRUB SERPL-MCNC: 0.7 MG/DL (ref 0.2–1.3)
BUN SERPL-MCNC: 31 MG/DL (ref 7–30)
CALCIUM SERPL-MCNC: 9.8 MG/DL (ref 8.5–10.1)
CHLORIDE SERPL-SCNC: 106 MMOL/L (ref 94–109)
CO2 SERPL-SCNC: 27 MMOL/L (ref 20–32)
CREAT BLD-MCNC: 1.6 MG/DL (ref 0.66–1.25)
CREAT SERPL-MCNC: 1.54 MG/DL (ref 0.66–1.25)
DIFFERENTIAL METHOD BLD: ABNORMAL
EOSINOPHIL # BLD AUTO: 0.2 10E9/L (ref 0–0.7)
EOSINOPHIL NFR BLD AUTO: 3.5 %
ERYTHROCYTE [DISTWIDTH] IN BLOOD BY AUTOMATED COUNT: 13.8 % (ref 10–15)
GFR SERPL CREATININE-BSD FRML MDRD: 45 ML/MIN/{1.73_M2}
GFR SERPL CREATININE-BSD FRML MDRD: 49 ML/MIN/{1.73_M2}
GLUCOSE SERPL-MCNC: 179 MG/DL (ref 70–99)
HCT VFR BLD AUTO: 39.2 % (ref 40–53)
HGB BLD-MCNC: 13 G/DL (ref 13.3–17.7)
IMM GRANULOCYTES # BLD: 0 10E9/L (ref 0–0.4)
IMM GRANULOCYTES NFR BLD: 0.6 %
LYMPHOCYTES # BLD AUTO: 1.3 10E9/L (ref 0.8–5.3)
LYMPHOCYTES NFR BLD AUTO: 26 %
MAGNESIUM SERPL-MCNC: 2.2 MG/DL (ref 1.6–2.3)
MCH RBC QN AUTO: 32.3 PG (ref 26.5–33)
MCHC RBC AUTO-ENTMCNC: 33.2 G/DL (ref 31.5–36.5)
MCV RBC AUTO: 97 FL (ref 78–100)
MONOCYTES # BLD AUTO: 0.4 10E9/L (ref 0–1.3)
MONOCYTES NFR BLD AUTO: 8.1 %
NEUTROPHILS # BLD AUTO: 3.2 10E9/L (ref 1.6–8.3)
NEUTROPHILS NFR BLD AUTO: 61.4 %
NRBC # BLD AUTO: 0 10*3/UL
NRBC BLD AUTO-RTO: 0 /100
PLATELET # BLD AUTO: 125 10E9/L (ref 150–450)
POTASSIUM SERPL-SCNC: 4.8 MMOL/L (ref 3.4–5.3)
PROT SERPL-MCNC: 7.8 G/DL (ref 6.8–8.8)
RBC # BLD AUTO: 4.03 10E12/L (ref 4.4–5.9)
SODIUM SERPL-SCNC: 137 MMOL/L (ref 133–144)
TACROLIMUS BLD-MCNC: 6 UG/L (ref 5–15)
TME LAST DOSE: 2000 H
WBC # BLD AUTO: 5.2 10E9/L (ref 4–11)

## 2021-03-26 PROCEDURE — G1004 CDSM NDSC: HCPCS | Performed by: RADIOLOGY

## 2021-03-26 PROCEDURE — 82565 ASSAY OF CREATININE: CPT | Performed by: PATHOLOGY

## 2021-03-26 PROCEDURE — 74177 CT ABD & PELVIS W/CONTRAST: CPT | Mod: MG | Performed by: RADIOLOGY

## 2021-03-26 PROCEDURE — 80053 COMPREHEN METABOLIC PANEL: CPT | Mod: 59 | Performed by: PATHOLOGY

## 2021-03-26 PROCEDURE — 71260 CT THORAX DX C+: CPT | Mod: MG | Performed by: RADIOLOGY

## 2021-03-26 PROCEDURE — 80197 ASSAY OF TACROLIMUS: CPT | Performed by: INTERNAL MEDICINE

## 2021-03-26 PROCEDURE — 85025 COMPLETE CBC W/AUTO DIFF WBC: CPT | Performed by: PATHOLOGY

## 2021-03-26 PROCEDURE — 83735 ASSAY OF MAGNESIUM: CPT | Performed by: PATHOLOGY

## 2021-03-26 PROCEDURE — 82105 ALPHA-FETOPROTEIN SERUM: CPT | Performed by: INTERNAL MEDICINE

## 2021-03-26 PROCEDURE — 82248 BILIRUBIN DIRECT: CPT | Performed by: PATHOLOGY

## 2021-03-26 PROCEDURE — 36415 COLL VENOUS BLD VENIPUNCTURE: CPT | Performed by: PATHOLOGY

## 2021-03-26 RX ORDER — IOPAMIDOL 755 MG/ML
105 INJECTION, SOLUTION INTRAVASCULAR ONCE
Status: COMPLETED | OUTPATIENT
Start: 2021-03-26 | End: 2021-03-26

## 2021-03-26 RX ADMIN — IOPAMIDOL 105 ML: 755 INJECTION, SOLUTION INTRAVASCULAR at 08:45

## 2021-03-29 ENCOUNTER — VIRTUAL VISIT (OUTPATIENT)
Dept: GASTROENTEROLOGY | Facility: CLINIC | Age: 56
End: 2021-03-29
Attending: INTERNAL MEDICINE
Payer: MEDICARE

## 2021-03-29 ENCOUNTER — VIRTUAL VISIT (OUTPATIENT)
Dept: ONCOLOGY | Facility: CLINIC | Age: 56
End: 2021-03-29
Attending: NURSE PRACTITIONER
Payer: MEDICARE

## 2021-03-29 DIAGNOSIS — B19.10 HEPATITIS B: ICD-10-CM

## 2021-03-29 DIAGNOSIS — Z94.4 LIVER TRANSPLANT RECIPIENT (H): Primary | ICD-10-CM

## 2021-03-29 DIAGNOSIS — Z94.4 STATUS POST LIVER TRANSPLANTATION (H): ICD-10-CM

## 2021-03-29 DIAGNOSIS — C22.0 HCC (HEPATOCELLULAR CARCINOMA) (H): ICD-10-CM

## 2021-03-29 DIAGNOSIS — Z94.4 LIVER TRANSPLANT RECIPIENT (H): ICD-10-CM

## 2021-03-29 PROCEDURE — 99215 OFFICE O/P EST HI 40 MIN: CPT | Mod: 95 | Performed by: NURSE PRACTITIONER

## 2021-03-29 PROCEDURE — 999N001193 HC VIDEO/TELEPHONE VISIT; NO CHARGE

## 2021-03-29 PROCEDURE — 99214 OFFICE O/P EST MOD 30 MIN: CPT | Mod: 95 | Performed by: INTERNAL MEDICINE

## 2021-03-29 RX ORDER — LAMIVUDINE 100 MG/1
100 TABLET, FILM COATED ORAL DAILY
Qty: 90 TABLET | Refills: 3 | Status: SHIPPED | OUTPATIENT
Start: 2021-03-29 | End: 2022-04-08

## 2021-03-29 RX ORDER — GABAPENTIN 300 MG/1
300 CAPSULE ORAL 3 TIMES DAILY
COMMUNITY
Start: 2021-03-19 | End: 2021-06-29

## 2021-03-29 NOTE — LETTER
3/29/2021         RE: Frandy Workman  [address] [address]        Dear Colleague,    Thank you for referring your patient, Frandy Workman, to the [facility]. Please see a copy of my visit note below.    Miller is a 56 year old who is being evaluated via a billable video visit.      How would you like to obtain your AVS? MyChart  If the video visit is dropped, the invitation should be resent by: Text to cell phone: [phone]  Will anyone else be joining your video visit? No      Video Start Time: 1108  Video-Visit Details    Type of service:  Video Visit    Video End Time:11:22 AM    Originating Location (pt. Location): Home    Distant Location (provider location):  [facility]     Platform used for Video Visit: Odette MATTHEWS CMA  ________________________________________________________________________________________    [facility]    Hepatology follow-up    Follow-up visit for liver transplant    Subjective:  56 year old male    OLT 11/11/19  - ESTRADA/HCC  - mikaela-op MELD= 12  - donor- donor age 63/local/DBD/ECD- hep B core positive/donor CMV(+)/recipient CMV(-)  - operation- CiT 7:39, EBL 2L, piggyback IVC, duct-to-duct biliary anastomosis  - post-op course- perihepatic hematoma; MMF colitis Dec 2019  - immunosuppression- FK     HCC  - dx Dec 2018  - MRI liver 12/23/18- 3.1cm lesion segment IVB, 1.1cm lesion segment III  - AFP 12/28/18= 5.2  - bone scan 1/4/19  - CT chest 1/4/19  - TACE 1/22/19 (seg IV); microwave ablation 1/23/19 (seg III)  - explant pathology- no viable tumor; moderately differentiated; no vascular invasion  - last MRI liver 9/25/2020, with no evidence of recurrence  - last imaging CT C/A/P 3/6/2021  - last AFP 3/26/21 <1.5    Last visit 10/2020.  The patient underwent CT chest/abdomen/pelvis last week showed no evidence of recurrent HCC.  No new medications, ER  visits or hospitalizations since last clinic visit.     The patient is well today.  He continues to have issues with his mental health and has been following regularly with Joseph Murillo and at the [facility], as well as Dr. Navid Parish, his psychiatrist.  The patient continues to have issues with constipation.  He has had no issues related to liver dysfunction.      The patient denies abdominal pain, lower extremity edema or jaundice.      The patient gets occasional tremors, but denies any headaches or confusion.      No fevers, sweats or chills.  No cough or dyspnea.  The patient had an influenza vaccination 10/2020.  He has had his first dose of the Pfizer COVID-19 vaccine and is due to receive a second dose next Monday.      The patient is fully adherent to his medications.  He has had no issues obtaining his medications via his insurance or from local pharmacy.     The patient does not drink alcohol.       Medical hx Surgical hx   Past Medical History:   Diagnosis Date     Anemia 2013     Arthritis      BPH (benign prostatic hyperplasia)      CAD (coronary artery disease) 4/1/2019     Cholelithiasis      Conductive hearing loss 08/16/2017     Depressive disorder 1986    Suffer effects throughout life     Gastroesophageal reflux disease 12/01/2014     HCC (hepatocellular carcinoma) (H) 1/22/2019     History of diabetic retinopathy 07/2018     HTN (hypertension)      HTN (hypertension) 11/20/2019     Hyperlipidemia      Liver cirrhosis secondary to ESTRADA (H)      Liver transplanted (H) 11/11/2019     Portal vein thrombosis 4/11/2019     Type II diabetes mellitus (H)       Past Surgical History:   Procedure Laterality Date     COLONOSCOPY      2015     COLONOSCOPY N/A 12/6/2019    Procedure: COLONOSCOPY, WITH POLYPECTOMY AND BIOPSY;  Surgeon: Adam Morton MD;  Location:  GI     CV HEART CATHETERIZATION WITH POSSIBLE INTERVENTION N/A 2/26/2019    Procedure: CORS;  Surgeon: Jagdish Hoyt MD;  Location:   HEART CARDIAC CATH LAB     ESOPHAGOSCOPY, GASTROSCOPY, DUODENOSCOPY (EGD), COMBINED N/A 2016    Procedure: COMBINED ESOPHAGOSCOPY, GASTROSCOPY, DUODENOSCOPY (EGD);  Surgeon: Santi Rosas MD;  Location: [facility] GI     ESOPHAGOSCOPY, GASTROSCOPY, DUODENOSCOPY (EGD), COMBINED N/A 2017    Procedure: COMBINED ESOPHAGOSCOPY, GASTROSCOPY, DUODENOSCOPY (EGD);  EGD;  Surgeon: Santi Rosas MD;  Location: [facility] GI     ESOPHAGOSCOPY, GASTROSCOPY, DUODENOSCOPY (EGD), COMBINED N/A 2018    Procedure: EGD;  Surgeon: Santi Rosas MD;  Location: [facility] OR     ESOPHAGOSCOPY, GASTROSCOPY, DUODENOSCOPY (EGD), COMBINED N/A 2019    Procedure: ESOPHAGOGASTRODUODENOSCOPY, WITH BIOPSY;  Surgeon: Adam Morton MD;  Location: [facility] GI     ESOPHAGOSCOPY, GASTROSCOPY, DUODENOSCOPY (EGD), COMBINED N/A 2020    Procedure: ESOPHAGOGASTRODUODENOSCOPY (EGD);  Surgeon: Santi Rosas MD;  Location:  GI     HEAD & NECK SURGERY      2017 at [facility].      IMPLANT GOLD WEIGHT EYELID Right 2017    Procedure: IMPLANT WEIGHT EYELID;  Right Upper Eyelid Weight, right tarsal strip lower eyelid;  Surgeon: Milana Malave MD;  Location: [facility] OR     IR CHEMO EMBOLIZATION  2019     KNEE SURGERY Left      ORTHOPEDIC SURGERY       PAROTIDECTOMY, RADICAL NECK DISSECTION Right 2017    Procedure: PAROTIDECTOMY, RADICAL NECK DISSECTION;  Right Superfacial Parotidectomy , Facial nerve repair. with [facility] facial nerve monitor.;  Surgeon: Asiya Morgan MD;  Location: [facility] OR     PICC INSERTION Left 2017    4fr SL BioFlo PICC, 44cm in the L basilic vein w/ tip in the low SVC     RETURN LIVER TRANSPLANT N/A 2019    Procedure: Exploratory laparotomy, hematoma evacuation, abdominal washout;  Surgeon: Александр Ramos MD;  Location: [facility] OR     TRANSPLANT LIVER RECIPIENT  DONOR N/A 2019    Procedure: TRANSPLANT, LIVER, RECIPIENT,  DONOR;  Surgeon: Александр Ramos,  MD;  Location: [facility] OR     VASCULAR SURGERY            Medications  Prior to Admission medications    Medication Sig Start Date End Date Taking? Authorizing Provider   Acetaminophen (TYLENOL) 325 MG CAPS Take 325-650 mg by mouth every 4 hours as needed (pain, fever) 7/14/20  Yes Nerissa Moe MD   ARIPiprazole (ABILIFY) 5 MG tablet daily 8/14/20  Yes Reported, Patient   Artificial Tear Solution (SM ARTIFICIAL TEARS) SOLN Place 1 drop into the right eye every hour as needed (dry eyes) Apply at least 4 times daily and as needed for dry eye 7/21/20  Yes Nerissa Moe MD   aspirin 81 MG EC tablet Take 1 tablet (81 mg) by mouth daily 7/10/20  Yes Santi Rosas MD   BD VIKTORIA U/F 32G X 4 MM insulin pen needle Use 5 per day 7/20/20  Yes Tish Toscano PA-C   carvedilol (COREG) 3.125 MG tablet Take 1 tablet (3.125 mg) by mouth 2 times daily (with meals) 1/18/21  Yes Nerissa Moe MD   ciclopirox (LOPROX) 0.77 % cream Apply topically 2 times daily To feet and toenails. 7/21/20  Yes Lamin Gonzalez DPM   Continuous Blood Gluc  (FREESTYLE CLAUDIA 14 DAY READER) CECILIO Use to read blood sugars as per 's instructions. 7/10/20  Yes Tish Toscano PA-C   Continuous Blood Gluc Sensor (FREESTYLE CLAUDIA 14 DAY SENSOR) MISC Change every 14 days. 1/26/21  Yes Tish Toscano PA-C   empagliflozin (JARDIANCE) 25 MG TABS tablet Take 1 tablet (25 mg) by mouth daily 1/26/21  Yes Tish Toscano PA-C   gabapentin (NEURONTIN) 300 MG capsule Take 300 mg by mouth 3 times daily 3/19/21  Yes Reported, Patient   glucose (BD GLUCOSE) 4 g chewable tablet Take 1 tablet by mouth every hour as needed for low blood sugar 7/14/20  Yes Nerissa Moe MD   insulin aspart (NOVOLOG PEN) 100 UNIT/ML pen Inject 1-15 Units Subcutaneous 3 times daily (with meals) 7/13/20  Yes Dorcas, Tish C, PA-C   insulin degludec (TRESIBA FLEXTOUCH) 100 UNIT/ML pen Inject 27 units subcutaneously daily 11/9/20  Yes Dorcas,  Tish PHELAN PA-C   Multiple Vitamin (THERAVITE PO) Take 1 tablet by mouth every morning  3/14/16  Yes Reported, Patient   order for DME 1 Device by Device route daily Knee high compression socks 15-20 mmhg. 7/10/20  Yes Lamin Gonzalez DPM   pantoprazole (PROTONIX) 40 MG EC tablet TAKE ONE TABLET BY MOUTH EVERY MORNING BEFORE BREAKFAST 10/1/20  Yes Nerissa Moe MD   polyethylene glycol (MIRALAX) 17 g packet Take 1 packet by mouth daily   Yes Unknown, Entered By History   rosuvastatin (CRESTOR) 5 MG tablet Take 1 tablet (5 mg) by mouth daily 7/28/20  Yes Manjeet Ireland MD   tacrolimus (GENERIC EQUIVALENT) 1 MG capsule Take 2 capsules (2 mg) by mouth every 12 hours 12/3/20  Yes Santi Rosas MD   tamsulosin (FLOMAX) 0.4 MG capsule Take 1 capsule (0.4 mg) by mouth daily 2/22/21  Yes Nerissa Moe MD   vitamin B-12 (CYANOCOBALAMIN) 1000 MCG tablet Take 1 tablet (1,000 mcg) by mouth daily 7/10/20  Yes Santi Rosas MD   vitamin D3 (CHOLECALCIFEROL) 2000 units (50 mcg) tablet Take 1 tablet (2,000 Units) by mouth daily 3/30/20  Yes Maximo Tucker,    zolpidem (AMBIEN) 10 MG tablet Take 10 mg by mouth daily 2/18/21  Yes Reported, Patient   lamiVUDine (EPIVIR) 100 MG tablet Take 1 tablet (100 mg) by mouth daily 3/29/21   Santi Rosas MD       Allergies  Allergies   Allergen Reactions     Codeine Other (See Comments)     Cannot take due to liver  Cannot tolerate oral narcotics     Seasonal Allergies      Sneezing, coughing, runny and itchy eyes       Review of systems  A 10-point review of systems was negative    Examination  Gen- well, NAD, A+Ox3, normal color  Eye- EOMI, no scleral icterus  Skin- no rash or jaundice  Psych- normal mood    Laboratory  Lab Results   Component Value Date     03/26/2021    POTASSIUM 4.8 03/26/2021    CHLORIDE 106 03/26/2021    CO2 27 03/26/2021    BUN 31 03/26/2021    CR 1.54 03/26/2021       Lab Results   Component Value Date     BILITOTAL 0.7 03/26/2021    ALT 33 03/26/2021    AST 16 03/26/2021    ALKPHOS 138 03/26/2021       Lab Results   Component Value Date    ALBUMIN 4.0 03/26/2021    PROTTOTAL 7.8 03/26/2021        Lab Results   Component Value Date    WBC 5.2 03/26/2021    HGB 13.0 03/26/2021    MCV 97 03/26/2021     03/26/2021       Lab Results   Component Value Date    INR 1.09 01/24/2020       Radiology  CT C/A/P March 2021 personally reviewed- no HCC    Assessment  56-year-old male who presents for routine follow-up of liver transplant in 11/2019 for ESTRADA cirrhosis complicated with HCC.  Liver function tests normal on current immunosuppression.  Will transition to calcineurin inhibitor-free immunosuppression regimen to preserve renal function.  No evidence of recurrent HCC on surveillance imaging.  Will address health care maintenance issues at next in-person visit.     Plan  1.  Liver transplant  - transition FK to MMF/pred    2.  Hx HCC  - repeat imaging per oncology    3.  Constipation  - MiraLax 1 sachet PO TID    Follow-up in 12 months    Santi Banuelos MD  Hepatology  [facility]

## 2021-03-29 NOTE — LETTER
"    3/29/2021         RE: Frandy Workman  [address] [facility] [address]        Dear Colleague,    Thank you for referring your patient, Frandy Workman, to the [facility]. Please see a copy of my visit note below.    Miller is a 56 year old who is being evaluated via a billable video visit.      How would you like to obtain your AVS? Mail a copy  If the video visit is dropped, the invitation should be resent by: Send to e-mail at: [email or web address]  Will anyone else be joining your video visit? No     Vitals - Patient Reported  Weight (Patient Reported): 86.2 kg (190 lb)  Height (Patient Reported): 175.3 cm (5' 9\")  BMI (Based on Pt Reported Ht/Wt): 28.06  Pain Score: No Pain (0)    Lea Meyer CMA March 29, 2021  2:08 PM         Video Start Time: 234  Video-Visit Details    Type of service:  Video Visit    Video End Time:2:43 PM    Originating Location (pt. Location): Home    Distant Location (provider location):  [facility]     Platform used for Video Visit: [employer]     Reason for Visit: seen in f/u of hepatocellular carcinoma    Oncology HPI:   Miller Workman is a 56 year old man with a PMH of DMII, cirrhosis s/t ESTRADA, HLD, HTN, GERD, BPH. He as diagnosed with hepatocellular carcinoma in December 2018 when he was found to have 2 LIRADS 5 lesions on surveillance imaging. He underwent TACE on 1/22/19.  He is liver transplant on 11/11/2019. The explanted liver had 2 lesions that were mostly necrotic and less than 3 cm with no clearly identifiable vascular invasion.   He is here for routine surveillance today.    Interval history: Miller is generally doing well. Met with Dr. Banuelos today, plans to have some adjustment of his anti-rejection medications to preserve liver function. Has some constipation, plans to increase the frequency of his miralax use.  No fevers/chills, cough, sob, cp, palpitation. No abdominal pain, jaundice, edema. No " bleeding/bruising. Bladder function wnl.  Diabetes is fairly well controlled, last A1C was 6.0. Energy is good. Appetite is good.    Current Outpatient Medications   Medication Sig Dispense Refill     Acetaminophen (TYLENOL) 325 MG CAPS Take 325-650 mg by mouth every 4 hours as needed (pain, fever) 100 capsule 1     ARIPiprazole (ABILIFY) 5 MG tablet daily       Artificial Tear Solution (SM ARTIFICIAL TEARS) SOLN Place 1 drop into the right eye every hour as needed (dry eyes) Apply at least 4 times daily and as needed for dry eye 15 mL 1     aspirin 81 MG EC tablet Take 1 tablet (81 mg) by mouth daily 90 tablet 1     BD VIKTORIA U/F 32G X 4 MM insulin pen needle Use 5 per day 300 each 3     carvedilol (COREG) 3.125 MG tablet Take 1 tablet (3.125 mg) by mouth 2 times daily (with meals) 60 tablet 2     ciclopirox (LOPROX) 0.77 % cream Apply topically 2 times daily To feet and toenails. 90 g 5     Continuous Blood Gluc  (FREESTYLE CLAUDIA 14 DAY READER) CECILIO Use to read blood sugars as per 's instructions. 1 each 0     Continuous Blood Gluc Sensor (FREESTYLE CLAUDIA 14 DAY SENSOR) MISC Change every 14 days. 9 each 3     empagliflozin (JARDIANCE) 25 MG TABS tablet Take 1 tablet (25 mg) by mouth daily 90 tablet 1     gabapentin (NEURONTIN) 300 MG capsule Take 300 mg by mouth 3 times daily       glucose (BD GLUCOSE) 4 g chewable tablet Take 1 tablet by mouth every hour as needed for low blood sugar 60 tablet 1     insulin aspart (NOVOLOG PEN) 100 UNIT/ML pen Inject 1-15 Units Subcutaneous 3 times daily (with meals) 20 mL 3     insulin degludec (TRESIBA FLEXTOUCH) 100 UNIT/ML pen Inject 27 units subcutaneously daily 25 mL 3     lamiVUDine (EPIVIR) 100 MG tablet Take 1 tablet (100 mg) by mouth daily 90 tablet 3     Multiple Vitamin (THERAVITE PO) Take 1 tablet by mouth every morning        order for DME 1 Device by Device route daily Knee high compression socks 15-20 mmhg. 1 Device 0     pantoprazole (PROTONIX)  40 MG EC tablet TAKE ONE TABLET BY MOUTH EVERY MORNING BEFORE BREAKFAST 90 tablet 3     polyethylene glycol (MIRALAX) 17 g packet Take 1 packet by mouth daily       rosuvastatin (CRESTOR) 5 MG tablet Take 1 tablet (5 mg) by mouth daily 90 tablet 3     tacrolimus (GENERIC EQUIVALENT) 1 MG capsule Take 2 capsules (2 mg) by mouth every 12 hours 360 capsule 3     tamsulosin (FLOMAX) 0.4 MG capsule Take 1 capsule (0.4 mg) by mouth daily 90 capsule 3     vitamin B-12 (CYANOCOBALAMIN) 1000 MCG tablet Take 1 tablet (1,000 mcg) by mouth daily 30 tablet 11     vitamin D3 (CHOLECALCIFEROL) 2000 units (50 mcg) tablet Take 1 tablet (2,000 Units) by mouth daily 90 tablet 3     zolpidem (AMBIEN) 10 MG tablet Take 10 mg by mouth daily            Allergies   Allergen Reactions     Codeine Other (See Comments)     Cannot take due to liver  Cannot tolerate oral narcotics     Seasonal Allergies      Sneezing, coughing, runny and itchy eyes         Video physical exam  General: Patient appears well in no acute distress.   Skin: No visualized rash or lesions on visualized skin  Eyes: EOMI, no erythema, sclera icterus or discharge noted  Resp: Appears to be breathing comfortably without accessory muscle usage, speaking in full sentences, no cough  MSK: Appears to have normal range of motion based on visualized movements  Neurologic: No apparent tremors, facial movements symmetric  Psych: affect engaged, pleasant, alert and oriented    The rest of a comprehensive physical examination is deferred due to PHE (public health emergency) video restrictions     There were no vitals taken for this visit.  Wt Readings from Last 4 Encounters:   11/11/20 78.5 kg (173 lb)   10/28/20 78.2 kg (172 lb 6.4 oz)   10/14/20 78 kg (171 lb 14.4 oz)   09/30/20 78.8 kg (173 lb 12.8 oz)         Labs: Results for MAGDY SANDERS (MRN [id number]) as of 3/29/2021 14:32   Ref. Range 3/26/2021 08:26   Sodium Latest Ref Range: 133 - 144 mmol/L 137   Potassium Latest Ref  Range: 3.4 - 5.3 mmol/L 4.8   Chloride Latest Ref Range: 94 - 109 mmol/L 106   Carbon Dioxide Latest Ref Range: 20 - 32 mmol/L 27   Urea Nitrogen Latest Ref Range: 7 - 30 mg/dL 31 (H)   Creatinine Latest Ref Range: 0.66 - 1.25 mg/dL 1.54 (H)   GFR Estimate Latest Ref Range: >60 mL/min/1.73_m2 49 (L)   GFR Estimate If Black Latest Ref Range: >60 mL/min/1.73_m2 57 (L)   Calcium Latest Ref Range: 8.5 - 10.1 mg/dL 9.8   Anion Gap Latest Ref Range: 3 - 14 mmol/L 4   Magnesium Latest Ref Range: 1.6 - 2.3 mg/dL 2.2   Albumin Latest Ref Range: 3.4 - 5.0 g/dL 4.0   Protein Total Latest Ref Range: 6.8 - 8.8 g/dL 7.8   Bilirubin Total Latest Ref Range: 0.2 - 1.3 mg/dL 0.7   Alkaline Phosphatase Latest Ref Range: 40 - 150 U/L 138   ALT Latest Ref Range: 0 - 70 U/L 33   AST Latest Ref Range: 0 - 45 U/L 16   Alpha Fetoprotein Latest Ref Range: 0 - 8 ug/L <1.5   Bilirubin Direct Latest Ref Range: 0.0 - 0.2 mg/dL 0.2   Glucose Latest Ref Range: 70 - 99 mg/dL 179 (H)   WBC Latest Ref Range: 4.0 - 11.0 10e9/L 5.2   Hemoglobin Latest Ref Range: 13.3 - 17.7 g/dL 13.0 (L)   Hematocrit Latest Ref Range: 40.0 - 53.0 % 39.2 (L)   Platelet Count Latest Ref Range: 150 - 450 10e9/L 125 (L)   RBC Count Latest Ref Range: 4.4 - 5.9 10e12/L 4.03 (L)   MCV Latest Ref Range: 78 - 100 fl 97   MCH Latest Ref Range: 26.5 - 33.0 pg 32.3   MCHC Latest Ref Range: 31.5 - 36.5 g/dL 33.2   RDW Latest Ref Range: 10.0 - 15.0 % 13.8   Diff Method Unknown Automated Method   % Neutrophils Latest Units: % 61.4   % Lymphocytes Latest Units: % 26.0   % Monocytes Latest Units: % 8.1   % Eosinophils Latest Units: % 3.5   % Basophils Latest Units: % 0.4   % Immature Granulocytes Latest Units: % 0.6   Nucleated RBCs Latest Ref Range: 0 /100 0   Absolute Neutrophil Latest Ref Range: 1.6 - 8.3 10e9/L 3.2   Absolute Lymphocytes Latest Ref Range: 0.8 - 5.3 10e9/L 1.3   Absolute Monocytes Latest Ref Range: 0.0 - 1.3 10e9/L 0.4   Absolute Eosinophils Latest Ref Range: 0.0 -  0.7 10e9/L 0.2   Absolute Basophils Latest Ref Range: 0.0 - 0.2 10e9/L 0.0   Abs Immature Granulocytes Latest Ref Range: 0 - 0.4 10e9/L 0.0   Absolute Nucleated RBC Unknown 0.0   Tacrolimus Last Dose Unknown 2,000   Tacrolimus Level Latest Ref Range: 5.0 - 15.0 ug/L 6.0       Imaging:   EXAMINATION: CT CHEST/ABDOMEN/PELVIS W CONTRAST, 3/26/2021 9:06 AM     TECHNIQUE:  Helical CT images from the thoracic inlet through the  symphysis pubis were obtained  with contrast. Contrast dose: Isovue  370 105cc     COMPARISON: Chest CT 9/25/2020; MRI 9/25/2020.     HISTORY: HCC (hepatocellular carcinoma) (H); Liver transplant  recipient (H)     FINDINGS:     LUNGS: Biapical pleural thickening/scarring. No acute airspace  opacities. No focal consolidation. Mild bilateral lower lobes  dependent atelectasis. Mild atelectasis in the lingula and middle  lobe. Stable sub-6 mm pulmonary nodules for example in the right apex,  series 11 image 40. No new or enlarging pulmonary nodules.     Mediastinum: No thyroid nodules. Central tracheobronchial tree is  patent. Minimal fluid in the superior aortic recess. Mild patulous  esophagus. Thoracic aorta and main pulmonary artery with normal  diameter. Atherosclerotic calcifications of the thoracic aorta, great  vessels and coronary arteries. No central pulmonary embolism. Cardiac  size within normal limits. No pericardial effusions. Small small right  pleural effusion. No left pleural effusion. No pneumothorax. No  thoracic lymphadenopathy. Moderate to marked bilateral gynecomastia.     Abdomen and pelvis: Postoperative changes of liver transplant. No  arterially enhancing liver lesions. No convincing suspicious liver  lesions. Patent liver vasculature with patent vascular anastomosis. No  biliary tree dilation. Surgically absent gallbladder. Soft tissue  density about the jeff hepatis likely due to postoperative changes.     No ascites. Enlarged spleen measuring 16 cm in craniocaudad  dimension.  No focal splenic lesions. Atrophic pancreas with previously visualized  subcentimeter cystic foci on MRI 9/25/2020, not well characterized on  today's exam. Main pancreatic duct is not dilated.     No adrenal gland nodules. No renal stones. No hydronephrosis.  Subcentimeter cortical hypodensities in the kidneys, presumably renal  cysts. Small fat-containing umbilical hernia. Unremarkable urinary  bladder. Prominent prostate. Pelvic phleboliths. Symmetric seminal  vesicles. No inguinal lymphadenopathy. No suspicious pelvic lymph  nodes. Calcified foci in the mid pelvis, indeterminate, presumably  benign.     No free fluid. No free air. No abdominal aortic aneurysm. Mild  vascular calcifications. Prominent jeff hepatis lymph nodes.  Additional subcentimeter retroperitoneal and mesenteric lymph nodes  are not enlarged by size criteria. Dilated portal vein, dilated  splenic vein. Portosystemic collaterals at the splenic hilum. Stable  nonocclusive thrombus in the SMV.     Esophageal and gastric fundal varices. Hiatal hernia. No dilated loops  of bowel. No bowel wall thickening. Moderate to large colonic stool  burden. The appendix is not dilated.     Bones: Degenerative changes of the spine. Degenerative changes of  bilateral SI joints and hips. Enthesopathic changes of the pelvis and  hips. Scattered Schmorl nodes. No convincing aggressive bone lesions.                                                                      IMPRESSION:  1. Postoperative changes of liver transplant. No focal liver lesions.  2. 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  Remaining abdominal vasculature is patent.  3. Trace right pleural effusion with overlying atelectasis. Stable  sub-6 mm pulmonary nodules. Attention on follow-up studies.  4. Sequela of portal hypertension including splenomegaly and  portosystemic collaterals. No ascites.  5. Additional incidental findings as described above.     DAVID NICOLE  ANIA GERARD MD    Impression/plan:   HCC, s/p liver transplantation on 1/11/2019  -I reviewed his imaging and labs. There is no evidence of recurrence. His pulmonary nodules are stable and the small effusion noted is smaller than his imaging last year. Will continue to follow with CT imaging in 6 months. Will f/u with Dr. Villarreal at that time. He will continue close follow-up with Dr. Banuelos in hepatology and Dr. Moe for his diabetes care and primary health/routine health maintenance.    40 minutes spent on the date of the encounter doing chart review, review of test results, interpretation of tests, patient visit and documentation           Again, thank you for allowing me to participate in the care of your patient.        Sincerely,        SHANIQUE Liz CNP

## 2021-03-29 NOTE — PROGRESS NOTES
Miller is a 56 year old who is being evaluated via a billable video visit.      How would you like to obtain your AVS? MyChart  If the video visit is dropped, the invitation should be resent by: Text to cell phone: [phone]  Will anyone else be joining your video visit? No      Video Start Time: 1108  Video-Visit Details    Type of service:  Video Visit    Video End Time:11:22 AM    Originating Location (pt. Location): Home    Distant Location (provider location):  [facility] [place]     Platform used for Video Visit: Odette MATTHEWS CMA  ________________________________________________________________________________________    [facility]    Hepatology follow-up    Follow-up visit for liver transplant    Subjective:  56 year old male    OLT 11/11/19  - ESTRADA/HCC  - mikaela-op MELD= 12  - donor- donor age 63/local/DBD/ECD- hep B core positive/donor CMV(+)/recipient CMV(-)  - operation- CiT 7:39, EBL 2L, piggyback IVC, duct-to-duct biliary anastomosis  - post-op course- perihepatic hematoma; MMF colitis Dec 2019  - immunosuppression- FK     HCC  - dx Dec 2018  - MRI liver 12/23/18- 3.1cm lesion segment IVB, 1.1cm lesion segment III  - AFP 12/28/18= 5.2  - bone scan 1/4/19  - CT chest 1/4/19  - TACE 1/22/19 (seg IV); microwave ablation 1/23/19 (seg III)  - explant pathology- no viable tumor; moderately differentiated; no vascular invasion  - last MRI liver 9/25/2020, with no evidence of recurrence  - last imaging CT C/A/P 3/6/2021  - last AFP 3/26/21 <1.5    Last visit 10/2020.  The patient underwent CT chest/abdomen/pelvis last week showed no evidence of recurrent HCC.  No new medications, ER visits or hospitalizations since last clinic visit.     The patient is well today.  He continues to have issues with his mental health and has been following regularly with Joseph Murillo and [place] [facility] as Dr. Navid Parish, his psychiatrist.  The patient  continues to have issues with constipation.  He has had no issues related to liver dysfunction.      The patient denies abdominal pain, lower extremity edema or jaundice.      The patient gets occasional tremors, but denies any headaches or confusion.      No fevers, sweats or chills.  No cough or dyspnea.  The patient had an influenza vaccination 10/2020.  He has had his first dose of the Pfizer COVID-19 vaccine and is due to receive a second dose next Monday.      The patient is fully adherent to his medications.  He has had no issues obtaining his medications via his insurance or from local pharmacy.     The patient does not drink alcohol.       Medical hx Surgical hx   Past Medical History:   Diagnosis Date     Anemia 2013     Arthritis      BPH (benign prostatic hyperplasia)      CAD (coronary artery disease) 4/1/2019     Cholelithiasis      Conductive hearing loss 08/16/2017     Depressive disorder 1986    Suffer effects throughout life     Gastroesophageal reflux disease 12/01/2014     HCC (hepatocellular carcinoma) (H) 1/22/2019     History of diabetic retinopathy 07/2018     HTN (hypertension)      HTN (hypertension) 11/20/2019     Hyperlipidemia      Liver cirrhosis secondary to ESTRADA (H)      Liver transplanted (H) 11/11/2019     Portal vein thrombosis 4/11/2019     Type II diabetes mellitus (H)       Past Surgical History:   Procedure Laterality Date     COLONOSCOPY      2015     COLONOSCOPY N/A 12/6/2019    Procedure: COLONOSCOPY, WITH POLYPECTOMY AND BIOPSY;  Surgeon: Adam Morton MD;  Location:  GI     CV HEART CATHETERIZATION WITH POSSIBLE INTERVENTION N/A 2/26/2019    Procedure: CORS;  Surgeon: Jagdish Hoyt MD;  Location:  HEART CARDIAC CATH LAB     ESOPHAGOSCOPY, GASTROSCOPY, DUODENOSCOPY (EGD), COMBINED N/A 11/17/2016    Procedure: COMBINED ESOPHAGOSCOPY, GASTROSCOPY, DUODENOSCOPY (EGD);  Surgeon: Santi Rosas MD;  Location:  GI     ESOPHAGOSCOPY, GASTROSCOPY,  DUODENOSCOPY (EGD), COMBINED N/A 2017    Procedure: COMBINED ESOPHAGOSCOPY, GASTROSCOPY, DUODENOSCOPY (EGD);  EGD;  Surgeon: Santi Rosas MD;  Location: [facility] GI     ESOPHAGOSCOPY, GASTROSCOPY, DUODENOSCOPY (EGD), COMBINED N/A 2018    Procedure: EGD;  Surgeon: Santi Rosas MD;  Location: [facility]     ESOPHAGOSCOPY, GASTROSCOPY, DUODENOSCOPY (EGD), COMBINED N/A 2019    Procedure: ESOPHAGOGASTRODUODENOSCOPY, WITH BIOPSY;  Surgeon: Adam Morton MD;  Location:  GI     ESOPHAGOSCOPY, GASTROSCOPY, DUODENOSCOPY (EGD), COMBINED N/A 2020    Procedure: ESOPHAGOGASTRODUODENOSCOPY (EGD);  Surgeon: Santi Rosas MD;  [facility]     HEAD & NECK SURGERY      2017 at [facility].      IMPLANT GOLD WEIGHT EYELID Right 2017    Procedure: IMPLANT WEIGHT EYELID;  Right Upper Eyelid Weight, right tarsal strip lower eyelid;  Surgeon: Milana Malave MD;  Location: [facility] OR     IR CHEMO EMBOLIZATION  2019     KNEE SURGERY Left      ORTHOPEDIC SURGERY       PAROTIDECTOMY, RADICAL NECK DISSECTION Right 2017    Procedure: PAROTIDECTOMY, RADICAL NECK DISSECTION;  Right Superfacial Parotidectomy , Facial nerve repair. with [facility] facial nerve monitor.;  Surgeon: Asiya Morgan MD;  Location: [facility] OR     PICC INSERTION Left 2017    4fr SL BioFlo PICC, 44cm in the L basilic vein w/ tip in the low SVC     RETURN LIVER TRANSPLANT N/A 2019    Procedure: Exploratory laparotomy, hematoma evacuation, abdominal washout;  Surgeon: Александр Ramos MD;  Location: [facility] OR     TRANSPLANT LIVER RECIPIENT  DONOR N/A 2019    Procedure: TRANSPLANT, LIVER, RECIPIENT,  DONOR;  Surgeon: Александр Ramos MD;  Location: [facility] OR     VASCULAR SURGERY            Medications  Prior to Admission medications    Medication Sig Start Date End Date Taking? Authorizing Provider   Acetaminophen (TYLENOL) 325 MG CAPS Take 325-650 mg by mouth every 4 hours as needed (pain,  fever) 7/14/20  Yes Nerissa Moe MD   ARIPiprazole (ABILIFY) 5 MG tablet daily 8/14/20  Yes Reported, Patient   Artificial Tear Solution (SM ARTIFICIAL TEARS) SOLN Place 1 drop into the right eye every hour as needed (dry eyes) Apply at least 4 times daily and as needed for dry eye 7/21/20  Yes Nerissa Moe MD   aspirin 81 MG EC tablet Take 1 tablet (81 mg) by mouth daily 7/10/20  Yes Santi Rosas MD   BD VIKTORIA U/F 32G X 4 MM insulin pen needle Use 5 per day 7/20/20  Yes Tish Toscano PA-C   carvedilol (COREG) 3.125 MG tablet Take 1 tablet (3.125 mg) by mouth 2 times daily (with meals) 1/18/21  Yes Nerissa Moe MD   ciclopirox (LOPROX) 0.77 % cream Apply topically 2 times daily To feet and toenails. 7/21/20  Yes Lamin Gonzalez DPM   Continuous Blood Gluc  (FREESTYLE CLAUDIA 14 DAY READER) CECILIO Use to read blood sugars as per 's instructions. 7/10/20  Yes Tish Toscano PA-C   Continuous Blood Gluc Sensor (FREESTYLE CLAUDIA 14 DAY SENSOR) MISC Change every 14 days. 1/26/21  Yes Tish Toscano PA-C   empagliflozin (JARDIANCE) 25 MG TABS tablet Take 1 tablet (25 mg) by mouth daily 1/26/21  Yes Tish Toscano PA-C   gabapentin (NEURONTIN) 300 MG capsule Take 300 mg by mouth 3 times daily 3/19/21  Yes Reported, Patient   glucose (BD GLUCOSE) 4 g chewable tablet Take 1 tablet by mouth every hour as needed for low blood sugar 7/14/20  Yes Nerissa Moe MD   insulin aspart (NOVOLOG PEN) 100 UNIT/ML pen Inject 1-15 Units Subcutaneous 3 times daily (with meals) 7/13/20  Yes Tish Toscano PA-C   insulin degludec (TRESIBA FLEXTOUCH) 100 UNIT/ML pen Inject 27 units subcutaneously daily 11/9/20  Yes Tish Toscano PA-C   Multiple Vitamin (THERAVITE PO) Take 1 tablet by mouth every morning  3/14/16  Yes Reported, Patient   order for DME 1 Device by Device route daily Knee high compression socks 15-20 mmhg. 7/10/20  Yes Lamin Gonzalez DPM   pantoprazole  (PROTONIX) 40 MG EC tablet TAKE ONE TABLET BY MOUTH EVERY MORNING BEFORE BREAKFAST 10/1/20  Yes Nerissa Moe MD   polyethylene glycol (MIRALAX) 17 g packet Take 1 packet by mouth daily   Yes Unknown, Entered By History   rosuvastatin (CRESTOR) 5 MG tablet Take 1 tablet (5 mg) by mouth daily 7/28/20  Yes Manjeet Ireland MD   tacrolimus (GENERIC EQUIVALENT) 1 MG capsule Take 2 capsules (2 mg) by mouth every 12 hours 12/3/20  Yes Santi Rosas MD   tamsulosin (FLOMAX) 0.4 MG capsule Take 1 capsule (0.4 mg) by mouth daily 2/22/21  Yes Nerissa Moe MD   vitamin B-12 (CYANOCOBALAMIN) 1000 MCG tablet Take 1 tablet (1,000 mcg) by mouth daily 7/10/20  Yes Santi Rosas MD   vitamin D3 (CHOLECALCIFEROL) 2000 units (50 mcg) tablet Take 1 tablet (2,000 Units) by mouth daily 3/30/20  Yes Maximo Tucker,    zolpidem (AMBIEN) 10 MG tablet Take 10 mg by mouth daily 2/18/21  Yes Reported, Patient   lamiVUDine (EPIVIR) 100 MG tablet Take 1 tablet (100 mg) by mouth daily 3/29/21   Santi Rosas MD       Allergies  Allergies   Allergen Reactions     Codeine Other (See Comments)     Cannot take due to liver  Cannot tolerate oral narcotics     Seasonal Allergies      Sneezing, coughing, runny and itchy eyes       Review of systems  A 10-point review of systems was negative    Examination  Gen- well, NAD, A+Ox3, normal color  Eye- EOMI, no scleral icterus  Skin- no rash or jaundice  Psych- normal mood    Laboratory  Lab Results   Component Value Date     03/26/2021    POTASSIUM 4.8 03/26/2021    CHLORIDE 106 03/26/2021    CO2 27 03/26/2021    BUN 31 03/26/2021    CR 1.54 03/26/2021       Lab Results   Component Value Date    BILITOTAL 0.7 03/26/2021    ALT 33 03/26/2021    AST 16 03/26/2021    ALKPHOS 138 03/26/2021       Lab Results   Component Value Date    ALBUMIN 4.0 03/26/2021    PROTTOTAL 7.8 03/26/2021        Lab Results   Component Value Date    WBC 5.2 03/26/2021     HGB 13.0 03/26/2021    MCV 97 03/26/2021     03/26/2021       Lab Results   Component Value Date    INR 1.09 01/24/2020       Radiology  CT C/A/P March 2021 personally reviewed- no HCC    Assessment  56-year-old male who presents for routine follow-up of liver transplant in 11/2019 for ESTRADA cirrhosis complicated with HCC.  Liver function tests normal on current immunosuppression.  Will transition to calcineurin inhibitor-free immunosuppression regimen to preserve renal function.  No evidence of recurrent HCC on surveillance imaging.  Will address health care maintenance issues at next in-person visit.     Plan  1.  Liver transplant  - transition FK to MMF/pred    2.  Hx HCC  - repeat imaging per oncology    3.  Constipation  - MiraLax 1 sachet PO TID    Follow-up in 12 months    Santi Banuelos MD  Hepatology  [facility]

## 2021-03-29 NOTE — PROGRESS NOTES
"Miller is a 56 year old who is being evaluated via a billable video visit.      How would you like to obtain your AVS? Mail a copy  If the video visit is dropped, the invitation should be resent by: Send to e-mail at: [email or web address]  Will anyone else be joining your video visit? No     Vitals - Patient Reported  Weight (Patient Reported): 86.2 kg (190 lb)  Height (Patient Reported): 175.3 cm (5' 9\")  BMI (Based on Pt Reported Ht/Wt): 28.06  Pain Score: No Pain (0)    Lea Meyer CMA March 29, 2021  2:08 PM         Video Start Time: 234  Video-Visit Details    Type of service:  Video Visit    Video End Time:2:43 PM    Originating Location (pt. Location): Home    Distant Location (provider location):  [facility]     Platform used for Video Visit: [employer]     Reason for Visit: seen in f/u of hepatocellular carcinoma    Oncology HPI:   Miller Workman is a 56 year old man with a PMH of DMII, cirrhosis s/t ESTRADA, HLD, HTN, GERD, BPH. He as diagnosed with hepatocellular carcinoma in December 2018 when he was found to have 2 LIRADS 5 lesions on surveillance imaging. He underwent TACE on 1/22/19.  He is liver transplant on 11/11/2019. The explanted liver had 2 lesions that were mostly necrotic and less than 3 cm with no clearly identifiable vascular invasion.   He is here for routine surveillance today.    Interval history: Miller is generally doing well. Met with Dr. Banuelos today, plans to have some adjustment of his anti-rejection medications to preserve liver function. Has some constipation, plans to increase the frequency of his miralax use.  No fevers/chills, cough, sob, cp, palpitation. No abdominal pain, jaundice, edema. No bleeding/bruising. Bladder function wnl.  Diabetes is fairly well controlled, last A1C was 6.0. Energy is good. Appetite is good.    Current Outpatient Medications   Medication Sig Dispense Refill     Acetaminophen (TYLENOL) 325 MG CAPS Take 325-650 mg by mouth every 4 " hours as needed (pain, fever) 100 capsule 1     ARIPiprazole (ABILIFY) 5 MG tablet daily       Artificial Tear Solution (SM ARTIFICIAL TEARS) SOLN Place 1 drop into the right eye every hour as needed (dry eyes) Apply at least 4 times daily and as needed for dry eye 15 mL 1     aspirin 81 MG EC tablet Take 1 tablet (81 mg) by mouth daily 90 tablet 1     BD VIKTORIA U/F 32G X 4 MM insulin pen needle Use 5 per day 300 each 3     carvedilol (COREG) 3.125 MG tablet Take 1 tablet (3.125 mg) by mouth 2 times daily (with meals) 60 tablet 2     ciclopirox (LOPROX) 0.77 % cream Apply topically 2 times daily To feet and toenails. 90 g 5     Continuous Blood Gluc  (FREESTYLE CLAUDIA 14 DAY READER) CECILIO Use to read blood sugars as per 's instructions. 1 each 0     Continuous Blood Gluc Sensor (Live Current MediaSTYLE CLAUDIA 14 DAY SENSOR) MISC Change every 14 days. 9 each 3     empagliflozin (JARDIANCE) 25 MG TABS tablet Take 1 tablet (25 mg) by mouth daily 90 tablet 1     gabapentin (NEURONTIN) 300 MG capsule Take 300 mg by mouth 3 times daily       glucose (BD GLUCOSE) 4 g chewable tablet Take 1 tablet by mouth every hour as needed for low blood sugar 60 tablet 1     insulin aspart (NOVOLOG PEN) 100 UNIT/ML pen Inject 1-15 Units Subcutaneous 3 times daily (with meals) 20 mL 3     insulin degludec (TRESIBA FLEXTOUCH) 100 UNIT/ML pen Inject 27 units subcutaneously daily 25 mL 3     lamiVUDine (EPIVIR) 100 MG tablet Take 1 tablet (100 mg) by mouth daily 90 tablet 3     Multiple Vitamin (THERAVITE PO) Take 1 tablet by mouth every morning        order for DME 1 Device by Device route daily Knee high compression socks 15-20 mmhg. 1 Device 0     pantoprazole (PROTONIX) 40 MG EC tablet TAKE ONE TABLET BY MOUTH EVERY MORNING BEFORE BREAKFAST 90 tablet 3     polyethylene glycol (MIRALAX) 17 g packet Take 1 packet by mouth daily       rosuvastatin (CRESTOR) 5 MG tablet Take 1 tablet (5 mg) by mouth daily 90 tablet 3     tacrolimus  (GENERIC EQUIVALENT) 1 MG capsule Take 2 capsules (2 mg) by mouth every 12 hours 360 capsule 3     tamsulosin (FLOMAX) 0.4 MG capsule Take 1 capsule (0.4 mg) by mouth daily 90 capsule 3     vitamin B-12 (CYANOCOBALAMIN) 1000 MCG tablet Take 1 tablet (1,000 mcg) by mouth daily 30 tablet 11     vitamin D3 (CHOLECALCIFEROL) 2000 units (50 mcg) tablet Take 1 tablet (2,000 Units) by mouth daily 90 tablet 3     zolpidem (AMBIEN) 10 MG tablet Take 10 mg by mouth daily            Allergies   Allergen Reactions     Codeine Other (See Comments)     Cannot take due to liver  Cannot tolerate oral narcotics     Seasonal Allergies      Sneezing, coughing, runny and itchy eyes         Video physical exam  General: Patient appears well in no acute distress.   Skin: No visualized rash or lesions on visualized skin  Eyes: EOMI, no erythema, sclera icterus or discharge noted  Resp: Appears to be breathing comfortably without accessory muscle usage, speaking in full sentences, no cough  MSK: Appears to have normal range of motion based on visualized movements  Neurologic: No apparent tremors, facial movements symmetric  Psych: affect engaged, pleasant, alert and oriented    The rest of a comprehensive physical examination is deferred due to PHE (public health emergency) video restrictions     There were no vitals taken for this visit.  Wt Readings from Last 4 Encounters:   11/11/20 78.5 kg (173 lb)   10/28/20 78.2 kg (172 lb 6.4 oz)   10/14/20 78 kg (171 lb 14.4 oz)   09/30/20 78.8 kg (173 lb 12.8 oz)         Labs: Results for MAGDY SANDERS (MRN [id number]) as of 3/29/2021 14:32   Ref. Range 3/26/2021 08:26   Sodium Latest Ref Range: 133 - 144 mmol/L 137   Potassium Latest Ref Range: 3.4 - 5.3 mmol/L 4.8   Chloride Latest Ref Range: 94 - 109 mmol/L 106   Carbon Dioxide Latest Ref Range: 20 - 32 mmol/L 27   Urea Nitrogen Latest Ref Range: 7 - 30 mg/dL 31 (H)   Creatinine Latest Ref Range: 0.66 - 1.25 mg/dL 1.54 (H)   GFR Estimate  Latest Ref Range: >60 mL/min/1.73_m2 49 (L)   GFR Estimate If Black Latest Ref Range: >60 mL/min/1.73_m2 57 (L)   Calcium Latest Ref Range: 8.5 - 10.1 mg/dL 9.8   Anion Gap Latest Ref Range: 3 - 14 mmol/L 4   Magnesium Latest Ref Range: 1.6 - 2.3 mg/dL 2.2   Albumin Latest Ref Range: 3.4 - 5.0 g/dL 4.0   Protein Total Latest Ref Range: 6.8 - 8.8 g/dL 7.8   Bilirubin Total Latest Ref Range: 0.2 - 1.3 mg/dL 0.7   Alkaline Phosphatase Latest Ref Range: 40 - 150 U/L 138   ALT Latest Ref Range: 0 - 70 U/L 33   AST Latest Ref Range: 0 - 45 U/L 16   Alpha Fetoprotein Latest Ref Range: 0 - 8 ug/L <1.5   Bilirubin Direct Latest Ref Range: 0.0 - 0.2 mg/dL 0.2   Glucose Latest Ref Range: 70 - 99 mg/dL 179 (H)   WBC Latest Ref Range: 4.0 - 11.0 10e9/L 5.2   Hemoglobin Latest Ref Range: 13.3 - 17.7 g/dL 13.0 (L)   Hematocrit Latest Ref Range: 40.0 - 53.0 % 39.2 (L)   Platelet Count Latest Ref Range: 150 - 450 10e9/L 125 (L)   RBC Count Latest Ref Range: 4.4 - 5.9 10e12/L 4.03 (L)   MCV Latest Ref Range: 78 - 100 fl 97   MCH Latest Ref Range: 26.5 - 33.0 pg 32.3   MCHC Latest Ref Range: 31.5 - 36.5 g/dL 33.2   RDW Latest Ref Range: 10.0 - 15.0 % 13.8   Diff Method Unknown Automated Method   % Neutrophils Latest Units: % 61.4   % Lymphocytes Latest Units: % 26.0   % Monocytes Latest Units: % 8.1   % Eosinophils Latest Units: % 3.5   % Basophils Latest Units: % 0.4   % Immature Granulocytes Latest Units: % 0.6   Nucleated RBCs Latest Ref Range: 0 /100 0   Absolute Neutrophil Latest Ref Range: 1.6 - 8.3 10e9/L 3.2   Absolute Lymphocytes Latest Ref Range: 0.8 - 5.3 10e9/L 1.3   Absolute Monocytes Latest Ref Range: 0.0 - 1.3 10e9/L 0.4   Absolute Eosinophils Latest Ref Range: 0.0 - 0.7 10e9/L 0.2   Absolute Basophils Latest Ref Range: 0.0 - 0.2 10e9/L 0.0   Abs Immature Granulocytes Latest Ref Range: 0 - 0.4 10e9/L 0.0   Absolute Nucleated RBC Unknown 0.0   Tacrolimus Last Dose Unknown 2,000   Tacrolimus Level Latest Ref Range: 5.0 -  15.0 ug/L 6.0       Imaging:   EXAMINATION: CT CHEST/ABDOMEN/PELVIS W CONTRAST, 3/26/2021 9:06 AM     TECHNIQUE:  Helical CT images from the thoracic inlet through the  symphysis pubis were obtained  with contrast. Contrast dose: Isovue  370 105cc     COMPARISON: Chest CT 9/25/2020; MRI 9/25/2020.     HISTORY: HCC (hepatocellular carcinoma) (H); Liver transplant  recipient (H)     FINDINGS:     LUNGS: Biapical pleural thickening/scarring. No acute airspace  opacities. No focal consolidation. Mild bilateral lower lobes  dependent atelectasis. Mild atelectasis in the lingula and middle  lobe. Stable sub-6 mm pulmonary nodules for example in the right apex,  series 11 image 40. No new or enlarging pulmonary nodules.     Mediastinum: No thyroid nodules. Central tracheobronchial tree is  patent. Minimal fluid in the superior aortic recess. Mild patulous  esophagus. Thoracic aorta and main pulmonary artery with normal  diameter. Atherosclerotic calcifications of the thoracic aorta, great  vessels and coronary arteries. No central pulmonary embolism. Cardiac  size within normal limits. No pericardial effusions. Small small right  pleural effusion. No left pleural effusion. No pneumothorax. No  thoracic lymphadenopathy. Moderate to marked bilateral gynecomastia.     Abdomen and pelvis: Postoperative changes of liver transplant. No  arterially enhancing liver lesions. No convincing suspicious liver  lesions. Patent liver vasculature with patent vascular anastomosis. No  biliary tree dilation. Surgically absent gallbladder. Soft tissue  density about the jeff hepatis likely due to postoperative changes.     No ascites. Enlarged spleen measuring 16 cm in craniocaudad dimension.  No focal splenic lesions. Atrophic pancreas with previously visualized  subcentimeter cystic foci on MRI 9/25/2020, not well characterized on  today's exam. Main pancreatic duct is not dilated.     No adrenal gland nodules. No renal stones. No  hydronephrosis.  Subcentimeter cortical hypodensities in the kidneys, presumably renal  cysts. Small fat-containing umbilical hernia. Unremarkable urinary  bladder. Prominent prostate. Pelvic phleboliths. Symmetric seminal  vesicles. No inguinal lymphadenopathy. No suspicious pelvic lymph  nodes. Calcified foci in the mid pelvis, indeterminate, presumably  benign.     No free fluid. No free air. No abdominal aortic aneurysm. Mild  vascular calcifications. Prominent jeff hepatis lymph nodes.  Additional subcentimeter retroperitoneal and mesenteric lymph nodes  are not enlarged by size criteria. Dilated portal vein, dilated  splenic vein. Portosystemic collaterals at the splenic hilum. Stable  nonocclusive thrombus in the SMV.     Esophageal and gastric fundal varices. Hiatal hernia. No dilated loops  of bowel. No bowel wall thickening. Moderate to large colonic stool  burden. The appendix is not dilated.     Bones: Degenerative changes of the spine. Degenerative changes of  bilateral SI joints and hips. Enthesopathic changes of the pelvis and  hips. Scattered Schmorl nodes. No convincing aggressive bone lesions.                                                                      IMPRESSION:  1. Postoperative changes of liver transplant. No focal liver lesions.  2. 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  Remaining abdominal vasculature is patent.  3. Trace right pleural effusion with overlying atelectasis. Stable  sub-6 mm pulmonary nodules. Attention on follow-up studies.  4. Sequela of portal hypertension including splenomegaly and  portosystemic collaterals. No ascites.  5. Additional incidental findings as described above.     DAVID GERARD MD    Impression/plan:   HCC, s/p liver transplantation on 1/11/2019  -I reviewed his imaging and labs. There is no evidence of recurrence. His pulmonary nodules are stable and the small effusion noted is smaller than his imaging last year. Will  continue to follow with CT imaging in 6 months. Will f/u with Dr. Villarreal at that time. He will continue close follow-up with Dr. Banuelos in hepatology and Dr. Moe for his diabetes care and primary health/routine health maintenance.    40 minutes spent on the date of the encounter doing chart review, review of test results, interpretation of tests, patient visit and documentation

## 2021-03-30 ENCOUNTER — TELEPHONE (OUTPATIENT)
Dept: TRANSPLANT | Facility: CLINIC | Age: 56
End: 2021-03-30

## 2021-03-30 DIAGNOSIS — Z94.4 STATUS POST LIVER TRANSPLANTATION (H): Primary | ICD-10-CM

## 2021-03-30 RX ORDER — MYCOPHENOLATE MOFETIL 250 MG/1
750 CAPSULE ORAL EVERY 12 HOURS
Qty: 180 CAPSULE | Refills: 11 | Status: SHIPPED | OUTPATIENT
Start: 2021-03-30 | End: 2022-02-03

## 2021-03-30 RX ORDER — PREDNISONE 5 MG/1
5 TABLET ORAL DAILY
Qty: 90 TABLET | Refills: 3 | Status: SHIPPED | OUTPATIENT
Start: 2021-03-30 | End: 2021-08-12

## 2021-03-30 NOTE — TELEPHONE ENCOUNTER
Per DR Banuelos patient to switch over to 750 mg BID cellcept and 5 mg prednisone daily and wean tacrolimus off. Once patient receives the  Medications in mail patient will call to discuss the change process.

## 2021-03-31 ENCOUNTER — MYC MEDICAL ADVICE (OUTPATIENT)
Dept: INTERNAL MEDICINE | Facility: CLINIC | Age: 56
End: 2021-03-31

## 2021-04-01 ENCOUNTER — VIRTUAL VISIT (OUTPATIENT)
Dept: INTERNAL MEDICINE | Facility: CLINIC | Age: 56
End: 2021-04-01
Payer: MEDICARE

## 2021-04-01 DIAGNOSIS — K59.00 CONSTIPATION, UNSPECIFIED CONSTIPATION TYPE: ICD-10-CM

## 2021-04-01 DIAGNOSIS — N18.31 ANEMIA OF CHRONIC RENAL FAILURE, STAGE 3A (H): ICD-10-CM

## 2021-04-01 DIAGNOSIS — N18.31 CHRONIC KIDNEY DISEASE, STAGE 3A (H): ICD-10-CM

## 2021-04-01 DIAGNOSIS — E11.3293 TYPE 2 DIABETES MELLITUS WITH MILD NONPROLIFERATIVE RETINOPATHY OF BOTH EYES WITHOUT MACULAR EDEMA, UNSPECIFIED WHETHER LONG TERM INSULIN USE (H): ICD-10-CM

## 2021-04-01 DIAGNOSIS — D84.9 IMMUNOSUPPRESSED STATUS (H): ICD-10-CM

## 2021-04-01 DIAGNOSIS — Z94.4 STATUS POST LIVER TRANSPLANTATION (H): ICD-10-CM

## 2021-04-01 DIAGNOSIS — Z12.5 SPECIAL SCREENING FOR MALIGNANT NEOPLASM OF PROSTATE: ICD-10-CM

## 2021-04-01 DIAGNOSIS — N40.0 BENIGN PROSTATIC HYPERPLASIA WITHOUT LOWER URINARY TRACT SYMPTOMS: ICD-10-CM

## 2021-04-01 DIAGNOSIS — I10 BENIGN ESSENTIAL HYPERTENSION: Primary | ICD-10-CM

## 2021-04-01 DIAGNOSIS — C22.0 HCC (HEPATOCELLULAR CARCINOMA) (H): ICD-10-CM

## 2021-04-01 DIAGNOSIS — R61 EXCESSIVE SWEATING: ICD-10-CM

## 2021-04-01 DIAGNOSIS — D63.1 ANEMIA OF CHRONIC RENAL FAILURE, STAGE 3A (H): ICD-10-CM

## 2021-04-01 DIAGNOSIS — F41.9 ANXIETY: ICD-10-CM

## 2021-04-01 DIAGNOSIS — E78.5 DYSLIPIDEMIA: ICD-10-CM

## 2021-04-01 DIAGNOSIS — Z86.79 HISTORY OF CORONARY ARTERY DISEASE: ICD-10-CM

## 2021-04-01 DIAGNOSIS — Z23 NEED FOR VACCINATION: ICD-10-CM

## 2021-04-01 PROCEDURE — 99214 OFFICE O/P EST MOD 30 MIN: CPT | Mod: 95 | Performed by: INTERNAL MEDICINE

## 2021-04-01 NOTE — NURSING NOTE
Chief Complaint   Patient presents with     Recheck Medication     Kimberly Nissen, EMT at 12:14 PM on 4/1/2021    Video Visit Technology for this patient: Odette Video Visit- Patient was left in waiting room

## 2021-04-01 NOTE — PROGRESS NOTES
CC: follow up hypertension and other chronic conditions    Other health care team members:  PharmALEX Blum, Arie Welsh, Donald Bradley  Cardiology Dr. LORIE Ireland  GI/hepatology Dr. DINO Banuelos  Transplant Dr. SHAD Ramos,    Transplant coordinator ?Radha Ndiaye RN  Podiatry Dr. LORIE Gonzalez  Endocrinology LILLIAN Toscano,  Dr. Wilcox  Psychiatry Dr. Maldonado  Psychology:  Joseph Murillo, LP  Nephrology Dr. BRYANT Rao  Oncology NP AZIZA Wilson, Dr. GARFIELD Villarreal  Dermatology Dr. ALEX Smith  Ophthalmology Dr. KELLI Martin  Transplant  Dilan Pratt  Radiology Dr. 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  Hx ESTRADA, hepaticellular carcinoma 2018  Coronary artery disease,   S/P liver transplantation 2019  Immunosuppressed status,   HTN,  Dyslipidemia,   CKD3a,   Major Depressive DO,   Bipolar DO,   Anxiety  Anemia chronic renal failure,   Thrombocytopenia  Constipation  BPH  Night sweats    Today:  Since our last OV has seen Dr. Banuelos (transplant), Joseph Murillo (psychology), Tish Toscano (Diabetes), Dr. Martin (eye), Dr. Rao (renal), Dr. Evangelista (podiatry).  All specialists' notes were reviewed today.  Overall healthy.  Now on calcineurin inhibitor-free immunosuppression regimen.  Better for kidneys.  He and I both agree that his home BP's are reasonable (see below).  Tolerating meds.  Looking forward to exercising more.  Taking Miralax for constipation which is helping. Naps during the day, still taking Zolpidem.  Encouraged sleep hygiene. Looking forwa    We also reviewed Miller's home BP readings:  Below are my recent blood pressure results for the last ten days for out meeting tomorrow, Thanks, Miller:               3/23/21        Meds  118/75/80                          3/24/21        Meds  141/83/89              After Walk            3/25/21        Meds  119/75/96          Pt was asking about urine culture results. Pt informed culture is still pending. Questions answered.        Walked up stairs            3/26/21        Meds  144/72/94              After excercose            3/27/21        Meds  141/80/81              After excercose            3/26/21        Meds  129/71/92                          3/27/21        Meds  128/67/85                          3/28/21        Meds  138/72/91              After Wa;l            3/29/21        Meds  122/74/78                          3/30/21        Meds  120/72/76                          3/31/21        Meds  128/76/75      HCM:  Colonoscopy 12/2/19  Immunizations up to date except for Shingrix #2 and COVID #2  Most recent labs reviewed  Advance Directives and POLST on file as of  2017.  HIV negative 11/10/19  Hep C negative 11/10/19  PSA normal 2/4/19    Upcoming appointments between now and September:  Joseph Murillo, Dr. Martin, Dr. Gonzalez, Dr. Rao, Tish Toscano, Dr. Villarreal       Current Outpatient Medications   Medication Sig Dispense Refill     Acetaminophen (TYLENOL) 325 MG CAPS Take 325-650 mg by mouth every 4 hours as needed (pain, fever) 100 capsule 1     ARIPiprazole (ABILIFY) 5 MG tablet daily       Artificial Tear Solution (SM ARTIFICIAL TEARS) SOLN Place 1 drop into the right eye every hour as needed (dry eyes) Apply at least 4 times daily and as needed for dry eye 15 mL 1     aspirin 81 MG EC tablet Take 1 tablet (81 mg) by mouth daily 90 tablet 1     BD VIKTORIA U/F 32G X 4 MM insulin pen needle Use 5 per day 300 each 3     carvedilol (COREG) 3.125 MG tablet Take 1 tablet (3.125 mg) by mouth 2 times daily (with meals) 60 tablet 2     ciclopirox (LOPROX) 0.77 % cream Apply topically 2 times daily To feet and toenails. 90 g 5     Continuous Blood Gluc  (FREESTYLE CLAUDIA 14 DAY READER) CECILIO Use to read blood sugars as per 's instructions. 1 each 0     Continuous Blood Gluc Sensor (FREESTYLE CLAUDIA 14 DAY SENSOR) MISC Change every 14 days. 9 each 3     empagliflozin (JARDIANCE) 25 MG TABS tablet Take 1 tablet  (25 mg) by mouth daily 90 tablet 1     gabapentin (NEURONTIN) 300 MG capsule Take 300 mg by mouth 3 times daily       glucose (BD GLUCOSE) 4 g chewable tablet Take 1 tablet by mouth every hour as needed for low blood sugar 60 tablet 1     insulin aspart (NOVOLOG PEN) 100 UNIT/ML pen Inject 1-15 Units Subcutaneous 3 times daily (with meals) 20 mL 3     insulin degludec (TRESIBA FLEXTOUCH) 100 UNIT/ML pen Inject 27 units subcutaneously daily 25 mL 3     lamiVUDine (EPIVIR) 100 MG tablet Take 1 tablet (100 mg) by mouth daily 90 tablet 3     Multiple Vitamin (THERAVITE PO) Take 1 tablet by mouth every morning        mycophenolate (GENERIC EQUIVALENT) 250 MG capsule Take 3 capsules (750 mg) by mouth every 12 hours 180 capsule 11     order for DME 1 Device by Device route daily Knee high compression socks 15-20 mmhg. 1 Device 0     pantoprazole (PROTONIX) 40 MG EC tablet TAKE ONE TABLET BY MOUTH EVERY MORNING BEFORE BREAKFAST 90 tablet 3     polyethylene glycol (MIRALAX) 17 g packet Take 1 packet by mouth daily       predniSONE (DELTASONE) 5 MG tablet Take 1 tablet (5 mg) by mouth daily 90 tablet 3     rosuvastatin (CRESTOR) 5 MG tablet Take 1 tablet (5 mg) by mouth daily 90 tablet 3     tacrolimus (GENERIC EQUIVALENT) 1 MG capsule Take 2 capsules (2 mg) by mouth every 12 hours 360 capsule 3     tamsulosin (FLOMAX) 0.4 MG capsule Take 1 capsule (0.4 mg) by mouth daily 90 capsule 3     vitamin B-12 (CYANOCOBALAMIN) 1000 MCG tablet Take 1 tablet (1,000 mcg) by mouth daily 30 tablet 11     vitamin D3 (CHOLECALCIFEROL) 2000 units (50 mcg) tablet Take 1 tablet (2,000 Units) by mouth daily 90 tablet 3     zolpidem (AMBIEN) 10 MG tablet Take 10 mg by mouth daily       Allergies   Allergen Reactions     Codeine Other (See Comments)     Cannot take due to liver  Cannot tolerate oral narcotics     Seasonal Allergies      Sneezing, coughing, runny and itchy eyes     BP Readings from Last 6 Encounters:   03/01/21 129/84   11/11/20  "105/64   10/28/20 128/80   10/14/20 127/80   09/30/20 (!) 142/78   09/25/20 (P) 130/72     Wt Readings from Last 5 Encounters:   11/11/20 78.5 kg (173 lb)   10/28/20 78.2 kg (172 lb 6.4 oz)   10/14/20 78 kg (171 lb 14.4 oz)   09/30/20 78.8 kg (173 lb 12.8 oz)   09/25/20 (P) 79 kg (174 lb 1.6 oz)     Estimated body mass index is 25.55 kg/m  as calculated from the following:    Height as of 7/28/20: 1.753 m (5' 9\").    Weight as of 11/11/20: 78.5 kg (173 lb).  General:  Alert, appears generally healthy, not pale, range of affect within normal limits, breathing comfortably, actively engaged in conversation. No cough, wheeze, shortness of breath. EOM's intact, no facial assymetry, no dysarthria.    Frandy was seen today for recheck medication.    Diagnoses and all orders for this visit:    Benign essential hypertension    Type 2 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, unspecified whether long term insulin use (H)    Status post liver transplantation (H)    Immunosuppressed status (H)    HCC (hepatocellular carcinoma) (H)    Chronic kidney disease, stage 3a    History of coronary artery disease    Dyslipidemia    Anemia of chronic renal failure, stage 3a    Constipation, unspecified constipation type    Anxiety    Excessive sweating    Benign prostatic hyperplasia without lower urinary tract symptoms  -     PSA screen; Future    Need for vaccination  -     ZOSTER VACCINE RECOMBINANT ADJUVANTED IM NJX; Future    Special screening for malignant neoplasm of prostate   -     PSA screen; Future      Routine f/u 6 months.    Nerissa Moe M.D.  [facility]  [facility]     Patients: if you have questions or concerns about this progress note, please discuss them with the provider at a future office visit.        "

## 2021-04-01 NOTE — PROGRESS NOTES
"This patient is being evaluated via a billable video visit as an alternative to an in-person visit.       The patient has been notified of following:     \"This video visit will be conducted via a call between you and your physician/provider. We have found that certain health care needs can be provided without the need for an in-person physical exam.  This service lets us provide the care you need with a video conversation.  If a prescription is necessary we can send it directly to your pharmacy.  If lab work is needed we can place an order for that and you can then stop by our lab to have the test done at a later time. If during the course of the call the physician/provider feels a video visit is not appropriate, you will not be charged for this service.\"     Patient has given verbal consent for virtual video visit? Yes  Did patient initiate this virtual visit? Yes    Person spoken to:  Patient    This was a synchronous virtual visit  Location of patient: home  Location of physician:  home office  Department name:  Medicine  Mode of communication:  Video Conference via [employer]    Time video initiated: 1:07  Time video ended:  1:33 pm  Total length of video visit: 26 min    Nerissa Moe M.D.  Internal Medicine  [facility]       Patients: if you have questions or concerns about this progress note, please discuss them with the provider at a future office visit.    "

## 2021-04-01 NOTE — PATIENT INSTRUCTIONS
"Patient Education   Tips for Sleep Hygiene  \"Sleep hygiene\" means having good sleep habits.Follow these tips to sleep better at night:     Get on a schedule. Go to bed and get up at about the same time every day.    Listen to your body. Only try to sleep when you actually feel tired or sleepy.    Be patient. If you haven't been able to get to sleep after about 30 minutes or more, get up and do something calming or boring until you feel sleepy. Then return to bed and try again.    Don't have caffeine (coffee, tea, cola drinks, chocolate and some medicines), alcohol or nicotine (cigarettes). These can make it harder for you to fall asleep and stay asleep.    Use your bed for sleeping only. That means no TV, computer or homework in bed, especially during the evening and before bedtime.    Don't nap during the day. If you must nap, make sure it is for less than 20 minutes.    Create sleep rituals that remind your body it is time to sleep. Examples include breathing exercises, stretching or reading a book.    Avoid all electronic media (smart phone, computer, tablet) within 2 hours of bed time. The \"blue light\" in these devices activates the part of the brain that keeps you awake.    Dim the lights at night.    Get early morning sources of light (walk in the sunshine) to help set sleep patterns at night.    Try a bath or shower before bed. Having a warm bath 1 to 2 hours before bedtime can help you feel sleepy. Hot baths can make you alert, so be mindful of the temperature.    Don't watch the clock. Checking the clock during the night can wake you up. It can also lead to negative thoughts such as, \"I will never fall asleep,\" which can increase anxiety and sleeplessness.    Use a sleep diary. Track your sleep schedule to know your sleep patterns and to see where you can improve.    Get regular exercise every day. Try not to do heavy exercise in the 4 hours before bedtime.    Eat a healthy, balanced diet.    Try eating a " light, healthy snack before bed, but avoid eating a heavy meal.    Create the right sleeping area. A cool, dark, quiet room is best. If needed, try earplugs, fans and blackout curtains.    Keep your daytime routine the same even if you have a bad night sleep. Avoiding activities the next day can make it harder to sleep.  For informational purposes only. Not to replace the advice of your health care provider.   Copyright   2013 [facility]. All rights reserved. [employer] [id number] - 01/16.

## 2021-04-05 ENCOUNTER — IMMUNIZATION (OUTPATIENT)
Dept: NURSING | Facility: CLINIC | Age: 56
End: 2021-04-05
Payer: MEDICARE

## 2021-04-05 PROCEDURE — 0002A PR COVID VAC PFIZER DIL RECON 30 MCG/0.3 ML IM: CPT

## 2021-04-05 PROCEDURE — 91300 PR COVID VAC PFIZER DIL RECON 30 MCG/0.3 ML IM: CPT

## 2021-04-07 ENCOUNTER — VIRTUAL VISIT (OUTPATIENT)
Dept: BEHAVIORAL HEALTH | Facility: CLINIC | Age: 56
End: 2021-04-07
Payer: MEDICARE

## 2021-04-07 DIAGNOSIS — F31.31 BIPOLAR 1 DISORDER, DEPRESSED, MILD (H): Primary | ICD-10-CM

## 2021-04-07 DIAGNOSIS — I10 HYPERTENSION, UNSPECIFIED TYPE: ICD-10-CM

## 2021-04-07 PROCEDURE — 90834 PSYTX W PT 45 MINUTES: CPT | Mod: 95 | Performed by: PSYCHOLOGIST

## 2021-04-07 ASSESSMENT — ANXIETY QUESTIONNAIRES
7. FEELING AFRAID AS IF SOMETHING AWFUL MIGHT HAPPEN: MORE THAN HALF THE DAYS
1. FEELING NERVOUS, ANXIOUS, OR ON EDGE: SEVERAL DAYS
GAD7 TOTAL SCORE: 9
7. FEELING AFRAID AS IF SOMETHING AWFUL MIGHT HAPPEN: MORE THAN HALF THE DAYS
GAD7 TOTAL SCORE: 9
4. TROUBLE RELAXING: MORE THAN HALF THE DAYS
3. WORRYING TOO MUCH ABOUT DIFFERENT THINGS: MORE THAN HALF THE DAYS
2. NOT BEING ABLE TO STOP OR CONTROL WORRYING: SEVERAL DAYS
5. BEING SO RESTLESS THAT IT IS HARD TO SIT STILL: NOT AT ALL
6. BECOMING EASILY ANNOYED OR IRRITABLE: SEVERAL DAYS

## 2021-04-07 ASSESSMENT — PATIENT HEALTH QUESTIONNAIRE - PHQ9
10. IF YOU CHECKED OFF ANY PROBLEMS, HOW DIFFICULT HAVE THESE PROBLEMS MADE IT FOR YOU TO DO YOUR WORK, TAKE CARE OF THINGS AT HOME, OR GET ALONG WITH OTHER PEOPLE: VERY DIFFICULT
SUM OF ALL RESPONSES TO PHQ QUESTIONS 1-9: 7
SUM OF ALL RESPONSES TO PHQ QUESTIONS 1-9: 7

## 2021-04-07 NOTE — PROGRESS NOTES
[facility]: Integrated Behavioral Health  April 7, 2021      Behavioral Health Clinician Progress Note    Patient Name: Frandy Workman           Service Type: Video visit      Service Location:  Video visit      Session Start Time: 1:00  Session End Time: 1:48       Session Length: 38 - 52      Attendees: Patient    Visit Activities (Refresh list every visit): [facility] Only     Telemedicine Visit: The patient's condition can be safely assessed and treated via synchronous audio and visual telemedicine encounter.      Reason for Telemedicine Visit: COVID-19    Originating Site (Patient Location): Patient's home    Distant Site (Provider Location): Provider Remote Setting    Consent:  The patient/guardian has verbally consented to: the potential risks and benefits of telemedicine (video visit) versus in person care; bill my insurance or make self-payment for services provided; and responsibility for payment of non-covered services.     Mode of Communication:  Video Conference via [employer]    As the provider I attest to compliance with applicable laws and regulations related to telemedicine.    Diagnostic Assessment Date: 12/03/2020  Treatment Plan Review Date:  7/7/2021; updated today  See Flowsheets for today's PHQ-9 and LIZZETTE-7 results  Previous PHQ-9:   PHQ-9 SCORE 10/13/2020 12/29/2020 4/7/2021   PHQ-9 Total Score MyChart 8 (Mild depression) 5 (Mild depression) 7 (Mild depression)   PHQ-9 Total Score 8 5 7     Previous LIZZETTE-7:   LIZZETTE-7 SCORE 10/13/2020 12/29/2020 4/7/2021   Total Score 6 (mild anxiety) 8 (mild anxiety) 9 (mild anxiety)   Total Score 6 8 9     Relationships   Social connections     Talks on phone: Once a week     Gets together: Once a week     Attends [employer] service: Never     Active member of club or organization: No     Attends meetings of clubs or organizations: Never     Relationship status:       HEATH LEVEL:  No flowsheet data found.    DATA  Extended  Session (60+ minutes): No  Interactive Complexity: No  Crisis: No    Treatment Objective(s) Addressed in This Session:  Target Behavior(s): disease management/lifestyle changes related to depressive and anxiety symptoms    Depression and anxious distress management.     Current Stressors / Issues:  Miller completed a follow-up appointment with this writer today. Session focused on continuing to help Miller with depressive symptoms and improve adaptive responses to stress and chronic disease management. Miller reports feeling positive about his disability claim being renewed with [employer], citing that his behavioral health records were able to sway the decision to continue his payments and him being unable to work. Miller did write a letter back to [employer] about how he disagreed with their evaluation that his medical issues are not sufficient to consider him unable to work and he cited rational for this in his letter. Supported and reinforced Miller in advocating for himself and taking proactive steps in managing stressful situations. Talked briefly about how this makes him feel empowered in difficult times.     Miller did raise the concern with me today about receiving a message on Facebook from a woman offering him participation in a program to receive a government kimberly of 300k in exchange for an upfront fee. Miller said he engaged with the woman and ended up sending her close to 37 thousand dollars. Miller indicated that the woman continues to harass him for more money, sometimes at night or during odd hours. Provided Miller very clear feedback that this was a scam and that he should immediately discontinue all contact with this individual online. Advised Miller to not send any more money to this person and do not reply to any follow-up messages. Encouraged Miller to also block contact with this person and instructed him how to do this.     Provided Miller supportive counseling and reassured him that scams, by design, often seem sophisticated and  trustworthy.     Talked about ways to prevent situations like this from happening from a behavioral standpoint, such as always consulting with someone before sending large sums of money and ways to detect scams/phishing attempts online. For instance, [facility] [place] encouraged Miller to consult with his friend and LAMBERTO Tomlinson in moments like this.      Miller verbalized understanding of information we talked about today and agreed to follow these steps to prevent further issue with this matter.     Progress on Treatment Objective(s) / Homework:  Satisfactory progress - ACTION (Actively working towards change); Intervened by reinforcing change plan / affirming steps taken    Supportive and solution-focused counseling emphasized today    Answers for HPI/ROS submitted by the patient on 4/7/2021   LIZZETTE 7 TOTAL SCORE: 9  If you checked off any problems, how difficult have these problems made it for you to do your work, take care of things at home, or get along with other people?: Very difficult  PHQ9 TOTAL SCORE: 7*    *No SI    Answers for HPI/ROS submitted by the patient on 10/13/2020   If you checked off any problems, how difficult have these problems made it for you to do your work, take care of things at home, or get along with other people?: Somewhat difficult  PHQ9 TOTAL SCORE: 8*  LIZZETTE 7 TOTAL SCORE: 6      *No SI     Answers for HPI/ROS submitted by the patient on 12/29/2020   If you checked off any problems, how difficult have these problems made it for you to do your work, take care of things at home, or get along with other people?: Somewhat difficult  PHQ9 TOTAL SCORE: 5*  LIZZETTE 7 TOTAL SCORE: 8    *No SI     Supportive counseling used    Solution-focused therapy used      Care Plan review completed: not today    Medication Review:  No changes to current psychiatric medication(s)     Medication Compliance:  Yes    Changes in Health Issues:   None reported    Chemical Use Review:   Substance Use: Chemical use reviewed, no active  concerns identified      Tobacco Use: No current tobacco use.         Assessment: Current Emotional / Mental Status (status of significant symptoms):  Risk status (Self / Other harm or suicidal ideation)  Patient denies a history of suicidal ideation, suicide attempts, self-injurious behavior, homicidal ideation, homicidal behavior and and other safety concerns     Patient denies current fears or concerns for personal safety.  Patient denies current or recent suicidal ideation or behaviors.  Patient denies current or recent homicidal ideation or behaviors.  Patient denies current or recent self injurious behavior or ideation.  Patient denies other safety concerns.     A safety and risk management plan has not been developed at this time, however patient was encouraged to call [place] [phone] should there be a change in any of these risk factors.    [place] Suicide Severity Rating Scale (Short Version)  [place] Suicide Severity Rating (Short Version) 1/22/2019 1/24/2019 11/10/2019 12/2/2019 12/10/2019 6/11/2020 7/1/2020   Over the past 2 weeks have you felt down, depressed, or hopeless? - - no yes yes no no   Over the past 2 weeks have you had thoughts of killing yourself? - - no yes no no no   Comments - - - lots of stressors, has thought about not taking meds - - -   Have you ever attempted to kill yourself? - - no no no no no   Q1 Wished to be Dead (Past Month) no no - other (see comments) no - -   Comments - - - why did i do this surgery - - -   Q2 Suicidal Thoughts (Past Month) no no - no no - -   Comments - - - wishes he wouldn't have gone through surgery - - -   Q3 Suicidal Thought Method - - - no no - -   Q4 Suicidal Intent without Specific Plan - - - no no - -   Q5 Suicide Intent with Specific Plan - - - no no - -   Q6 Suicide Behavior (Lifetime) no no - no no - -         Appearance:   Appropriate   Eye Contact:   Good   Psychomotor Behavior: Restless   Attitude:   Cooperative  Interested Friendly  Pleasant  Orientation:   All  Speech   Rate / Production: Normal/ Responsive   Volume:  Normal   Mood:    Anxious  Depressed   Affect:    Appropriate    Thought Content:  Clear   Thought Form:  Goal Directed  Logical   Insight:    Good     Diagnoses:  1. Bipolar 1 disorder, depressed, mild (H)          Collateral Reports Completed:  Not Applicable    Plan: (Homework, other):  Continue with this writer for individual psychotherapy in two weeks. Advised him to discontinue contact with individuals he does not know on Facebook/online. Continue medication regimen. Begin mild exercise (as directed by physician). Avoid mood-altering substances.     Joseph Murillo, RED  4/7/2021      ______________________________________________________________________    [facility] Integrated Behavioral Health Treatment Plan    Client's Name: Frandy Workman  YOB: 1965    Date: 4/7/2021    DSM-V Diagnoses: 296.51 Bipolar I Disorder Current or Most Recent Episode Depressed, Mild;     Clinical Global Impressions  First:  Considering your total clinical experience with this particular patient population, how severe are the patient's symptoms at this time?: 4 (12/18/2020  2:22 PM)      Most recent:  Compared to the patient's condition at the START of treatment, this patient's condition is: 2 (12/18/2020  2:22 PM)        Referral / Collaboration:  Will collaborate with care team as indicated during treatment.    Anticipated number of session or this episode of care: 10+      MeasurableTreatment Goal(s) related to diagnosis / functional impairment(s)  Goal 1:  Patient will experience a reduction in depressive and anxious symptoms, along with a corresponding increase in positive emotion and life satisfaction.    Objective #A: Patient will experience a reduction in depressed mood, will develop more effective coping skills to manage depressive symptoms, will develop healthy cognitive patterns and beliefs, will increase ability to function  adaptively and will continue to take medications as prescribed / participate in supportive activities and services    Status: Continued - Date(s): 4/7/2021    Objective #B: Patient will experience a reduction in anxiety, will develop more effective coping skills to manage anxiety symptoms, will develop healthy cognitive patterns and beliefs and will increase ability to function adaptively  Status: Continued - Date(s):4/7/2021    Objective #C: Patient will develop better understanding of triggers and coping strategies to stabilize mood  Status: Continued - Date(s): 4/7/2021    Goal 2:  Patient will identify and increase engagement in valued activity, i.e. improving social connections/relationships, pursuing occupational goals or personally meaningful pursuits, exploration of meaning in life.     Objective #A: Patient will identify meaningful activity in social, occupational and  personal goals, and increase behavioral activation around these goals   Status: Continued - Date(s): 4/7/2021    Objective #B: Patient will address relationship difficulties in a more adaptive manner  Status: Continued - Date(s): 4/7/2021    Objective #C: Patient will develop coping/problem-solving skills to facilitate more adaptive adjustment and will effectively address problems that interfere with adaptive functioning  Status: Continued - Date(s): 4/7/2021        Possible Therapeutic Intervention(s)  Psycho-education regarding mental health diagnoses and treatment options    Skills training    Explore skills useful to client in current situation.    Skills include assertiveness, communication, conflict management, problem-solving, relaxation, etc.    Solution-Focused Therapy    Explore patterns in patient's relationships and discuss options for new behaviors.    Explore patterns in patient's actions and choices and discuss options for new behaviors.    Cognitive-behavioral Therapy    Discuss common cognitive distortions, identify them in  patient's life.    Explore ways to challenge, replace, and act against these cognitions.    Acceptance and Commitment Therapy    Explore and identify important values in patient's life.    Discuss ways to commit to behavioral activation around these values.    Psychodynamic psychotherapy    Discuss patient's emotional dynamics and issues and how they impact behaviors.    Explore patient's history of relationships and how they impact present behaviors.    Explore how to work with and make changes in these schemas and patterns.    Narrative Therapy    Explore the patient's story of his/her life from his/her perspective.    Explore alternate ways of understanding their experience, identifying exceptions, developing new themes.    Interpersonal Psychotherapy    Explore patterns in relationships that are effective or ineffective at helping patient reach their goals, find satisfying experience.    Discuss new patterns or behaviors to engage in for improved social functioning.    Behavioral Activation    Discuss steps patient can take to become more involved in meaningful activity.    Identify barriers to these activities and explore possible solutions.    Mindfulness-Based Strategies    Discuss skills based on development and application of mindfulness.    Skills drawn from compassion-focused therapy, dialectical behavior therapy, mindfulness-based stress reduction, mindfulness-based cognitive therapy, etc.      We have developed these goals together during our work to this point. Patient has assisted in the development of these goals and has agreed to this treatment plan.       Joseph Murillo LP  4/7/2021

## 2021-04-08 ENCOUNTER — TELEPHONE (OUTPATIENT)
Dept: CARDIOLOGY | Facility: CLINIC | Age: 56
End: 2021-04-08

## 2021-04-08 ASSESSMENT — PATIENT HEALTH QUESTIONNAIRE - PHQ9: SUM OF ALL RESPONSES TO PHQ QUESTIONS 1-9: 7

## 2021-04-08 ASSESSMENT — ANXIETY QUESTIONNAIRES: GAD7 TOTAL SCORE: 9

## 2021-04-09 RX ORDER — CARVEDILOL 3.12 MG/1
3.12 TABLET ORAL 2 TIMES DAILY WITH MEALS
Qty: 60 TABLET | Refills: 5 | Status: SHIPPED | OUTPATIENT
Start: 2021-04-09 | End: 2021-04-19

## 2021-04-09 NOTE — TELEPHONE ENCOUNTER
carvedilol (COREG) 3.125 MG tablet   Take 1 tablet (3.125 mg) by mouth 2 times daily (with meals)     Last Written Prescription Date:  1/18/21  Last Fill Quantity: 60,   # refills: 2  Last Office Visit : 4/1/21  Routine f/u 6 months.  Future Office visit:  5/5/21      Refill to pharmacy.

## 2021-04-15 ENCOUNTER — TELEPHONE (OUTPATIENT)
Dept: TRANSPLANT | Facility: CLINIC | Age: 56
End: 2021-04-15

## 2021-04-15 DIAGNOSIS — Z94.4 LIVER TRANSPLANT RECIPIENT (H): ICD-10-CM

## 2021-04-15 DIAGNOSIS — Z94.4 STATUS POST LIVER TRANSPLANTATION (H): ICD-10-CM

## 2021-04-15 NOTE — TELEPHONE ENCOUNTER
Checked in with patient if he received the cellcept and prednisone. He reports that he did get cellcept,  But not prednisone he will call pharmacy now to get prednisone. He self stopped his tacro about a week ago. He forgot that he had to call to discuss. Pt will do labs. Pt can go Tuesday at the earliest.   Pt reports he went from constipated to going a 2-3 times a day. Pt will monitor and if diarrhea he will call and update.

## 2021-04-16 DIAGNOSIS — I10 HYPERTENSION, UNSPECIFIED TYPE: ICD-10-CM

## 2021-04-19 DIAGNOSIS — I10 HYPERTENSION, UNSPECIFIED TYPE: ICD-10-CM

## 2021-04-19 RX ORDER — CARVEDILOL 3.12 MG/1
TABLET ORAL
Qty: 60 TABLET | Refills: 2 | OUTPATIENT
Start: 2021-04-19

## 2021-04-19 RX ORDER — CARVEDILOL 3.12 MG/1
3.12 TABLET ORAL 2 TIMES DAILY WITH MEALS
Qty: 60 TABLET | Refills: 5 | Status: ON HOLD | OUTPATIENT
Start: 2021-04-19 | End: 2021-07-22

## 2021-04-19 RX ORDER — CHOLECALCIFEROL (VITAMIN D3) 50 MCG
50 TABLET ORAL DAILY
Qty: 90 TABLET | Refills: 3 | Status: SHIPPED | OUTPATIENT
Start: 2021-04-19 | End: 2022-05-07

## 2021-04-19 NOTE — TELEPHONE ENCOUNTER
carvedilol (COREG) 3.125 MG tablet  Last Written Prescription Date:  4/9/2021  Last Fill Quantity: 60,   # refills: 5  Last Office Visit :  4/1/2021  Future Office visit:  5/5/2021  Sent ordered to [facility] for Pt care on  4/19/2021.      Marsha Chung RN  Central Triage Red Flags/Med Refills

## 2021-04-19 NOTE — CONFIDENTIAL NOTE
4/1/2021  [facility] [place]   Nerissa Moe MD  Internal Medicine    vitamin D3 (CHOLECALCIFEROL) 2000 units (50 mcg) tablet

## 2021-04-19 NOTE — TELEPHONE ENCOUNTER
*WE CANNOT TRANSFER FROM OTHER MAIL ORDER PHARMACIES, PLEASE SEND NEW RX TO [place].*    Thank you!  Desirae Rodriguez CPhT  [facility]

## 2021-04-20 DIAGNOSIS — Z94.4 STATUS POST LIVER TRANSPLANTATION (H): ICD-10-CM

## 2021-04-20 DIAGNOSIS — Z12.5 SPECIAL SCREENING FOR MALIGNANT NEOPLASM OF PROSTATE: ICD-10-CM

## 2021-04-20 DIAGNOSIS — N40.0 BENIGN PROSTATIC HYPERPLASIA WITHOUT LOWER URINARY TRACT SYMPTOMS: ICD-10-CM

## 2021-04-20 DIAGNOSIS — Z94.4 LIVER REPLACED BY TRANSPLANT (H): ICD-10-CM

## 2021-04-20 DIAGNOSIS — R97.20 ELEVATED PROSTATE SPECIFIC ANTIGEN (PSA): Primary | ICD-10-CM

## 2021-04-20 LAB
ALBUMIN SERPL-MCNC: 3.8 G/DL (ref 3.4–5)
ALP SERPL-CCNC: 126 U/L (ref 40–150)
ALT SERPL W P-5'-P-CCNC: 29 U/L (ref 0–70)
ANION GAP SERPL CALCULATED.3IONS-SCNC: 6 MMOL/L (ref 3–14)
AST SERPL W P-5'-P-CCNC: 10 U/L (ref 0–45)
BILIRUB DIRECT SERPL-MCNC: 0.1 MG/DL (ref 0–0.2)
BILIRUB SERPL-MCNC: 0.7 MG/DL (ref 0.2–1.3)
BUN SERPL-MCNC: 36 MG/DL (ref 7–30)
CALCIUM SERPL-MCNC: 9.3 MG/DL (ref 8.5–10.1)
CHLORIDE SERPL-SCNC: 104 MMOL/L (ref 94–109)
CO2 SERPL-SCNC: 27 MMOL/L (ref 20–32)
CREAT SERPL-MCNC: 1.55 MG/DL (ref 0.66–1.25)
ERYTHROCYTE [DISTWIDTH] IN BLOOD BY AUTOMATED COUNT: 13.2 % (ref 10–15)
GFR SERPL CREATININE-BSD FRML MDRD: 49 ML/MIN/{1.73_M2}
GLUCOSE SERPL-MCNC: 254 MG/DL (ref 70–99)
HCT VFR BLD AUTO: 35.8 % (ref 40–53)
HGB BLD-MCNC: 12.1 G/DL (ref 13.3–17.7)
MAGNESIUM SERPL-MCNC: 2.2 MG/DL (ref 1.6–2.3)
MCH RBC QN AUTO: 32.7 PG (ref 26.5–33)
MCHC RBC AUTO-ENTMCNC: 33.8 G/DL (ref 31.5–36.5)
MCV RBC AUTO: 97 FL (ref 78–100)
PLATELET # BLD AUTO: 132 10E9/L (ref 150–450)
POTASSIUM SERPL-SCNC: 4.6 MMOL/L (ref 3.4–5.3)
PROT SERPL-MCNC: 7.6 G/DL (ref 6.8–8.8)
PSA SERPL-ACNC: 1.21 UG/L (ref 0–4)
RBC # BLD AUTO: 3.7 10E12/L (ref 4.4–5.9)
SODIUM SERPL-SCNC: 136 MMOL/L (ref 133–144)
WBC # BLD AUTO: 7.6 10E9/L (ref 4–11)

## 2021-04-20 PROCEDURE — 80076 HEPATIC FUNCTION PANEL: CPT | Performed by: PATHOLOGY

## 2021-04-20 PROCEDURE — 36415 COLL VENOUS BLD VENIPUNCTURE: CPT | Performed by: PATHOLOGY

## 2021-04-20 PROCEDURE — 83735 ASSAY OF MAGNESIUM: CPT | Performed by: PATHOLOGY

## 2021-04-20 PROCEDURE — 80048 BASIC METABOLIC PNL TOTAL CA: CPT | Performed by: PATHOLOGY

## 2021-04-20 PROCEDURE — 85027 COMPLETE CBC AUTOMATED: CPT | Performed by: PATHOLOGY

## 2021-04-20 PROCEDURE — G0103 PSA SCREENING: HCPCS | Performed by: PATHOLOGY

## 2021-04-22 NOTE — TELEPHONE ENCOUNTER
MEDICAL RECORDS REQUEST   [facility]  Urology Clinic  [address] [place] [address]  PHONE: [phone]  Fax: [phone]        FUTURE VISIT INFORMATION                                                   Frandy Workman, : 1965 scheduled for future visit at [facility]    APPOINTMENT INFORMATION:    Date: 21    Provider:  Pina    Reason for Visit/Diagnosis: Elevated prostate specific antigen    REFERRAL INFORMATION:    Referring provider:  Abhi,     Specialty: Internal Medicine    Referring providers clinic:  [facility]     Clinic contact number:  [phone]    RECORDS REQUESTED FOR VISIT                                                     NOTES  STATUS/DETAILS   OFFICE NOTE from referring provider  yes   OFFICE NOTE from other specialist  no   DISCHARGE SUMMARY from hospital  no   DISCHARGE REPORT from the ER  no   OPERATIVE REPORT  no   MEDICATION LIST  yes   LABS     URINALYSIS (UA)  no   URINE CYTOLOGY  no

## 2021-04-24 ENCOUNTER — HEALTH MAINTENANCE LETTER (OUTPATIENT)
Age: 56
End: 2021-04-24

## 2021-04-27 ENCOUNTER — VIRTUAL VISIT (OUTPATIENT)
Dept: BEHAVIORAL HEALTH | Facility: CLINIC | Age: 56
End: 2021-04-27
Payer: MEDICARE

## 2021-04-27 DIAGNOSIS — F31.31 BIPOLAR 1 DISORDER, DEPRESSED, MILD (H): Primary | ICD-10-CM

## 2021-04-27 PROCEDURE — 90834 PSYTX W PT 45 MINUTES: CPT | Mod: 95 | Performed by: PSYCHOLOGIST

## 2021-04-27 NOTE — PATIENT INSTRUCTIONS
"Patient Education     Tips for Sleep Hygiene  \"Sleep hygiene\" means having good sleep habits.Follow these tips to sleep better at night:     Get on a schedule. Go to bed and get up at about the same time every day.    Listen to your body. Only try to sleep when you actually feel tired or sleepy.    Be patient. If you haven't been able to get to sleep after about 30 minutes or more, get up and do something calming or boring until you feel sleepy. Then return to bed and try again.    Don't have caffeine (coffee, tea, cola drinks, chocolate and some medicines), alcohol or nicotine (cigarettes). These can make it harder for you to fall asleep and stay asleep.    Use your bed for sleeping only. That means no TV, computer or homework in bed, especially during the evening and before bedtime.    Don't nap during the day. If you must nap, make sure it is for less than 20 minutes.    Create sleep rituals that remind your body it is time to sleep. Examples include breathing exercises, stretching or reading a book.    Avoid all electronic media (smart phone, computer, tablet) within 2 hours of bed time. The \"blue light\" in these devices activates the part of the brain that keeps you awake.    Dim the lights at night.    Get early morning sources of light (walk in the sunshine) to help set sleep patterns at night.    Try a bath or shower before bed. Having a warm bath 1 to 2 hours before bedtime can help you feel sleepy. Hot baths can make you alert, so be mindful of the temperature.    Don't watch the clock. Checking the clock during the night can wake you up. It can also lead to negative thoughts such as, \"I will never fall asleep,\" which can increase anxiety and sleeplessness.    Use a sleep diary. Track your sleep schedule to know your sleep patterns and to see where you can improve.    Get regular exercise every day. Try not to do heavy exercise in the 4 hours before bedtime.    Eat a healthy, balanced diet.    Try eating a " light, healthy snack before bed, but avoid eating a heavy meal.    Create the right sleeping area. A cool, dark, quiet room is best. If needed, try earplugs, fans and blackout curtains.    Keep your daytime routine the same even if you have a bad night sleep. Avoiding activities the next day can make it harder to sleep.  For informational purposes only. Not to replace the advice of your health care provider.   Copyright   2013 [facility]. All rights reserved. [employer] [id number] - 01/16.
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
## 2021-05-05 DIAGNOSIS — Z94.4 LIVER REPLACED BY TRANSPLANT (H): ICD-10-CM

## 2021-05-05 DIAGNOSIS — Z94.4 STATUS POST LIVER TRANSPLANTATION (H): ICD-10-CM

## 2021-05-05 DIAGNOSIS — E11.3293 TYPE 2 DIABETES MELLITUS WITH MILD NONPROLIFERATIVE RETINOPATHY OF BOTH EYES WITHOUT MACULAR EDEMA, UNSPECIFIED WHETHER LONG TERM INSULIN USE (H): ICD-10-CM

## 2021-05-05 LAB
ALBUMIN SERPL-MCNC: 4.2 G/DL (ref 3.4–5)
ALP SERPL-CCNC: 112 U/L (ref 40–150)
ALT SERPL W P-5'-P-CCNC: 28 U/L (ref 0–70)
ANION GAP SERPL CALCULATED.3IONS-SCNC: 6 MMOL/L (ref 3–14)
AST SERPL W P-5'-P-CCNC: 13 U/L (ref 0–45)
BILIRUB DIRECT SERPL-MCNC: 0.1 MG/DL (ref 0–0.2)
BILIRUB SERPL-MCNC: 0.5 MG/DL (ref 0.2–1.3)
BUN SERPL-MCNC: 38 MG/DL (ref 7–30)
CALCIUM SERPL-MCNC: 9.6 MG/DL (ref 8.5–10.1)
CHLORIDE SERPL-SCNC: 107 MMOL/L (ref 94–109)
CO2 SERPL-SCNC: 26 MMOL/L (ref 20–32)
CREAT SERPL-MCNC: 1.52 MG/DL (ref 0.66–1.25)
ERYTHROCYTE [DISTWIDTH] IN BLOOD BY AUTOMATED COUNT: 13.3 % (ref 10–15)
GFR SERPL CREATININE-BSD FRML MDRD: 50 ML/MIN/{1.73_M2}
GLUCOSE SERPL-MCNC: 258 MG/DL (ref 70–99)
HCT VFR BLD AUTO: 35.4 % (ref 40–53)
HGB BLD-MCNC: 11.6 G/DL (ref 13.3–17.7)
MCH RBC QN AUTO: 31.9 PG (ref 26.5–33)
MCHC RBC AUTO-ENTMCNC: 32.8 G/DL (ref 31.5–36.5)
MCV RBC AUTO: 97 FL (ref 78–100)
PLATELET # BLD AUTO: 146 10E9/L (ref 150–450)
POTASSIUM SERPL-SCNC: 4.5 MMOL/L (ref 3.4–5.3)
PROT SERPL-MCNC: 7.8 G/DL (ref 6.8–8.8)
RBC # BLD AUTO: 3.64 10E12/L (ref 4.4–5.9)
SODIUM SERPL-SCNC: 139 MMOL/L (ref 133–144)
WBC # BLD AUTO: 5.6 10E9/L (ref 4–11)

## 2021-05-05 PROCEDURE — 36415 COLL VENOUS BLD VENIPUNCTURE: CPT | Performed by: PATHOLOGY

## 2021-05-05 PROCEDURE — 85027 COMPLETE CBC AUTOMATED: CPT | Performed by: PATHOLOGY

## 2021-05-05 PROCEDURE — 80048 BASIC METABOLIC PNL TOTAL CA: CPT | Performed by: PATHOLOGY

## 2021-05-05 PROCEDURE — 80076 HEPATIC FUNCTION PANEL: CPT | Performed by: PATHOLOGY

## 2021-05-05 RX ORDER — FLASH GLUCOSE SENSOR
KIT MISCELLANEOUS
Qty: 9 EACH | Refills: 3 | Status: SHIPPED | OUTPATIENT
Start: 2021-05-05 | End: 2021-07-01

## 2021-05-05 NOTE — TELEPHONE ENCOUNTER
pro-active refill request for freestyle camilo sensors. current rx written on 1/26 will be out of fills after 5/5.    Thank you!  [facility]

## 2021-05-07 ENCOUNTER — VIRTUAL VISIT (OUTPATIENT)
Dept: UROLOGY | Facility: CLINIC | Age: 56
End: 2021-05-07
Attending: INTERNAL MEDICINE
Payer: MEDICARE

## 2021-05-07 ENCOUNTER — PRE VISIT (OUTPATIENT)
Dept: UROLOGY | Facility: CLINIC | Age: 56
End: 2021-05-07

## 2021-05-07 DIAGNOSIS — Z94.4 LIVER TRANSPLANT RECIPIENT (H): ICD-10-CM

## 2021-05-07 DIAGNOSIS — R97.20 ELEVATED PROSTATE SPECIFIC ANTIGEN (PSA): Primary | ICD-10-CM

## 2021-05-07 PROCEDURE — 99203 OFFICE O/P NEW LOW 30 MIN: CPT | Mod: 95 | Performed by: STUDENT IN AN ORGANIZED HEALTH CARE EDUCATION/TRAINING PROGRAM

## 2021-05-07 NOTE — PATIENT INSTRUCTIONS
Follow-up in 6 months with PSA for an in person visit.  We will plan to do a rectal examination of the time as well

## 2021-05-07 NOTE — PROGRESS NOTES
Miller is a 56 year old who is being evaluated via a billable video visit.      How would you like to obtain your AVS? Mail a copy  If the video visit is dropped, the invitation should be resent by: Send to e-mail at: [email or web address]  Will anyone else be joining your video visit? No      Video Start Time: 10:50am  Video-Visit Details    Type of service:  Video Visit    Video End Time:11:00 AM    Originating Location (pt. Location): Home    Distant Location (provider location):  [facility] [place]     Platform used for Video Visit: [employer]           Chief Complaint:    Elevated PSA (Prostate Specific Antigen)           Consult or Referral:     Mr. Frandy Workman is a 56 year old male seen at the request of Dr. Moe.         History of Present Illness:    Frandy Workman is a very pleasant 56 year old male who presents with a history of Elevated PSA (Prostate Specific Antigen).      Reviewed previous notes from Dr. Moe    Miller is a known liver transplant recipient who has been following up with his primary care for his PSAs.  He has been regularly checking his PSAs since his 30s in view of his father's prostate cancer about 25 years back.  His father received radiation therapy and has been doing well since he is currently 86.  Miller also does report some nocturia about 2-3 times in the night.  He also has some issues with flow but he is not bothered significantly by it.  Of note he is taking tamsulosin.  No recent history of catheterization or urinary instrumentation of note PSA has been elevated from 0.4 2 years ago to 1.2 at this instance.  And he is here to address this concern.             Past Medical History:     Past Medical History:   Diagnosis Date     Anemia 2013     Arthritis      BPH (benign prostatic hyperplasia)      CAD (coronary artery disease) 4/1/2019     Cholelithiasis      Conductive hearing loss 08/16/2017     Depressive disorder 1986    Suffer effects  throughout life     Gastroesophageal reflux disease 12/01/2014     HCC (hepatocellular carcinoma) (H) 1/22/2019     History of diabetic retinopathy 07/2018     HTN (hypertension)      HTN (hypertension) 11/20/2019     Hyperlipidemia      Liver cirrhosis secondary to ESTRADA (H)      Liver transplanted (H) 11/11/2019     Portal vein thrombosis 4/11/2019     Type II diabetes mellitus (H)             Past Surgical History:     Past Surgical History:   Procedure Laterality Date     COLONOSCOPY      2015     COLONOSCOPY N/A 12/6/2019    Procedure: COLONOSCOPY, WITH POLYPECTOMY AND BIOPSY;  Surgeon: Adam Morton MD;  Location:  GI     CV HEART CATHETERIZATION WITH POSSIBLE INTERVENTION N/A 2/26/2019    Procedure: CORS;  Surgeon: Jagdish Hoyt MD;  Location:  HEART CARDIAC CATH LAB     ESOPHAGOSCOPY, GASTROSCOPY, DUODENOSCOPY (EGD), COMBINED N/A 11/17/2016    Procedure: COMBINED ESOPHAGOSCOPY, GASTROSCOPY, DUODENOSCOPY (EGD);  Surgeon: Santi Rosas MD;  Location:  GI     ESOPHAGOSCOPY, GASTROSCOPY, DUODENOSCOPY (EGD), COMBINED N/A 11/17/2017    Procedure: COMBINED ESOPHAGOSCOPY, GASTROSCOPY, DUODENOSCOPY (EGD);  EGD;  Surgeon: Santi Rosas MD;  Location:  GI     ESOPHAGOSCOPY, GASTROSCOPY, DUODENOSCOPY (EGD), COMBINED N/A 12/28/2018    Procedure: EGD;  Surgeon: Santi Rosas MD;  Location:  OR     ESOPHAGOSCOPY, GASTROSCOPY, DUODENOSCOPY (EGD), COMBINED N/A 12/6/2019    Procedure: ESOPHAGOGASTRODUODENOSCOPY, WITH BIOPSY;  Surgeon: Adam Morton MD;  Location:  GI     ESOPHAGOSCOPY, GASTROSCOPY, DUODENOSCOPY (EGD), COMBINED N/A 2/13/2020    Procedure: ESOPHAGOGASTRODUODENOSCOPY (EGD);  Surgeon: Santi Rosas MD;  Location:  GI     HEAD & NECK SURGERY      12/2017 at [facility].      IMPLANT GOLD WEIGHT EYELID Right 11/16/2017    Procedure: IMPLANT WEIGHT EYELID;  Right Upper Eyelid Weight, right tarsal strip lower eyelid;  Surgeon: Milana Malave MD;   Location: [facility] OR     IR CHEMO EMBOLIZATION  2019     KNEE SURGERY Left      ORTHOPEDIC SURGERY       PAROTIDECTOMY, RADICAL NECK DISSECTION Right 2017    Procedure: PAROTIDECTOMY, RADICAL NECK DISSECTION;  Right Superfacial Parotidectomy , Facial nerve repair. with [facility] facial nerve monitor.;  Surgeon: Asiya Morgan MD;  Location: [facility] OR     PICC INSERTION Left 2017    4fr SL BioFlo PICC, 44cm in the L basilic vein w/ tip in the low SVC     RETURN LIVER TRANSPLANT N/A 2019    Procedure: Exploratory laparotomy, hematoma evacuation, abdominal washout;  Surgeon: Александр Ramos MD;  Location: [facility] OR     TRANSPLANT LIVER RECIPIENT  DONOR N/A 2019    Procedure: TRANSPLANT, LIVER, RECIPIENT,  DONOR;  Surgeon: Александр Ramos MD;  Location: [facility] OR     VASCULAR SURGERY              Medications     Current Outpatient Medications   Medication     Acetaminophen (TYLENOL) 325 MG CAPS     ARIPiprazole (ABILIFY) 5 MG tablet     Artificial Tear Solution (SM ARTIFICIAL TEARS) SOLN     aspirin 81 MG EC tablet     BD VIKTORIA U/F 32G X 4 MM insulin pen needle     carvedilol (COREG) 3.125 MG tablet     ciclopirox (LOPROX) 0.77 % cream     Continuous Blood Gluc  (FREESTYLE CLAUDIA 14 DAY READER) CECILIO     Continuous Blood Gluc Sensor (FREESTYLE CLAUDIA 14 DAY SENSOR) MISC     empagliflozin (JARDIANCE) 25 MG TABS tablet     gabapentin (NEURONTIN) 300 MG capsule     glucose (BD GLUCOSE) 4 g chewable tablet     insulin aspart (NOVOLOG PEN) 100 UNIT/ML pen     insulin degludec (TRESIBA FLEXTOUCH) 100 UNIT/ML pen     lamiVUDine (EPIVIR) 100 MG tablet     Multiple Vitamin (THERAVITE PO)     mycophenolate (GENERIC EQUIVALENT) 250 MG capsule     order for DME     pantoprazole (PROTONIX) 40 MG EC tablet     polyethylene glycol (MIRALAX) 17 g packet     predniSONE (DELTASONE) 5 MG tablet     rosuvastatin (CRESTOR) 5 MG tablet     tamsulosin (FLOMAX) 0.4 MG capsule     vitamin B-12 (CYANOCOBALAMIN)  1000 MCG tablet     vitamin D3 (CHOLECALCIFEROL) 50 mcg (2000 units) tablet     zolpidem (AMBIEN) 10 MG tablet     Current Facility-Administered Medications   Medication     aflibercept (EYLEA) injection prefilled syringe 2 mg            Family History:     Family History   Problem Relation Age of Onset     Prostate Cancer Maternal Grandfather      Substance Abuse Maternal Grandfather         Alcohol     Colon Cancer Father 60     Pancreatic Cancer Father 60     Prostate Cancer Father      Colorectal Cancer Father      Macular Degeneration Father      Cancer Father      Glaucoma Father      Skin Cancer Father      Colorectal Cancer Maternal Grandmother      Cancer Maternal Grandmother      Substance Abuse Maternal Grandmother         Alcohol     Colorectal Cancer Paternal Grandmother      Cancer Mother      Diabetes Mother          3/2016     Cerebrovascular Disease Mother         Passed away in Feb of this year, 80 years old.     Thyroid Disease Mother      Depression Mother      Asthma Sister         Had since birth     Thyroid Disease Sister      Depression Sister      Liver Disease No family hx of      Melanoma No family hx of             Social History:     Social History     Socioeconomic History     Marital status:      Spouse name: Not on file     Number of children: Not on file     Years of education: Not on file     Highest education level: Bachelor's degree (e.g., BA, AB, BS)   Occupational History     Not on file   Social Needs     Financial resource strain: Not very hard     Food insecurity     Worry: Never true     Inability: Never true     Transportation needs     Medical: No     Non-medical: No   Tobacco Use     Smoking status: Former Smoker     Packs/day: 6.00     Years: 30.00     Pack years: 180.00     Types: Cigars     Start date: 2016     Quit date: 10/25/2017     Years since quitting: 3.5     Smokeless tobacco: Former User     Types: Chew     Quit date: 10/31/2017     Tobacco  comment: 1 tin per week   Substance and Sexual Activity     Alcohol use: No     Alcohol/week: 0.0 standard drinks     Frequency: Never     Drinks per session: Patient refused     Binge frequency: Never     Comment: quit Sept. 1996     Drug use: No     Sexual activity: Not Currently     Partners: Female     Birth control/protection: Condom   Lifestyle     Physical activity     Days per week: 1 day     Minutes per session: 60 min     Stress: To some extent   Relationships     Social connections     Talks on phone: Once a week     Gets together: Once a week     Attends [employer] service: Never     Active member of club or organization: No     Attends meetings of clubs or organizations: Never     Relationship status:      Intimate partner violence     Fear of current or ex partner: Not on file     Emotionally abused: Not on file     Physically abused: Not on file     Forced sexual activity: Not on file   Other Topics Concern     Parent/sibling w/ CABG, MI or angioplasty before 65F 55M? Yes   Social History Narrative    Prior , [place]            Allergies:   Codeine and Seasonal allergies         Review of Systems:  From intake questionnaire     Skin: negative  Eyes: negative  Ears/Nose/Throat: negative  Respiratory: No shortness of breath, dyspnea on exertion, cough, or hemoptysis  Cardiovascular: No chest pain or palpitations  Gastrointestinal: negative; no nausea/vomiting, constipation or diarrhea  Genitourinary: as per HPI  Musculoskeletal: negative  Neurologic: negative  Psychiatric: negative  Hematologic/Lymphatic/Immunologic: negative  Endocrine: negative         Physical Exam:   This is a virtual visit    Alert, no acute distress, oriented, conversant    Ears/nose/mouth: mouth:normal, good dentition  Respiratory: no respiratory distress, or pursed lip breathing  Cardiovascular:no obvious jugular venous distension present  Skin: no suspicious lesions or rashes on Visible body parts on the  Screen  Neuro: Alert, oriented, speech and mentation normal  Psych: affect and mood normal, alert and oriented to person, place and time      Labs and Pathology:    The following labs were reviewed by me and discussed with the patient:    Significant for   Lab Results   Component Value Date    CR 1.52 05/05/2021    CR 1.55 04/20/2021    CR 1.54 03/26/2021    CR 1.82 02/26/2021    CR 1.62 01/20/2021    CR 1.79 12/30/2020    CR 1.55 12/02/2020    CR 1.74 11/11/2020    CR 1.95 10/14/2020    CR 1.48 09/25/2020     PSA   Date Value Ref Range Status   04/20/2021 1.21 0 - 4 ug/L Final     Comment:     Assay Method:  Chemiluminescence using Siemens Vista analyzer   02/04/2019 0.40 0 - 4 ug/L Final     Comment:     Assay Method:  Chemiluminescence using Siemens Vista analyzer             Imaging:    The following imaging exams were independently viewed and interpreted by me and discussed with patient:  CT abdomen pelvis: 3/26/2021  Prominent prostate seen on the CT.             Assessment and Plan:     Assessment:     Elevated prostate specific antigen (PSA)  He has documented a recent rise in the PSA from 0.4-1.2.  The level of the PSA rise is still within the normal range but Miller does endorse a family history of prostate cancer (dad).  He has been having recent change in urine symptoms and he is currently on tamsulosin.  His CT scan done in March did show an enlarged prominent prostate.  We discussed about the reason for the rising PSA.  In the event of a significant worsening of urinary symptoms it could represent a rise in the size of the prostate or it could also represent a possible malignant  pathology, which is less likely.  However, his history of prostate cancer in his family makes him at high risk and we will keep a close eye on his PSA.  We will repeat the PSA in 6 months and assess the velocity of PSA rise and consider further evaluation if necessary.  - UROLOGY ADULT REFERRAL  - PSA tumor marker;  Future    Liver transplant recipient (H)  On immunosuppressants, may have an increased risk of prostate cancer in general in view of renal suppression.  We will continue monitoring PSA      Plan:  Review in 6 months with PSA and clinical examination    Orders  Orders Placed This Encounter   Procedures     PSA tumor marker         Adan Heller MD  [facility] [place]                  ==========================    Additional Billing and Coding Information:  Review of external notes as documented above   Review of the result(s) of each unique test - PSA    Independent interpretation of a test performed by another physician/other qualified health care professional (not separately reported) -CT abdomen pelvis    Discussion of management or test interpretation with external physician/other qualified healthcare professional/appropriate source -not applicable    Diagnosis or treatment significantly limited by social determinants of health -not applicable    22  minutes spent on the date of the encounter doing chart review, review of test results, interpretation of tests, patient visit and documentation     ==========================

## 2021-05-07 NOTE — LETTER
5/7/2021       RE: Frandy Workman  [address] [facility] [address]     Dear Colleague,    Thank you for referring your patient, Frandy Workman, to the [facility] [place] at [facility]. Please see a copy of my visit note below.    Miller is a 56 year old who is being evaluated via a billable video visit.      How would you like to obtain your AVS? Mail a copy  If the video visit is dropped, the invitation should be resent by: Send to e-mail at: [email or web address]  Will anyone else be joining your video visit? No      Video Start Time: 10:50am  Video-Visit Details    Type of service:  Video Visit    Video End Time:11:00 AM    Originating Location (pt. Location): Home    Distant Location (provider location):  [facility] [place]     Platform used for Video Visit: [email or web address]           Chief Complaint:    Elevated PSA (Prostate Specific Antigen)           Consult or Referral:     Mr. Frandy Workman is a 56 year old male seen at the request of Dr. Moe.         History of Present Illness:    Frandy Workman is a very pleasant 56 year old male who presents with a history of Elevated PSA (Prostate Specific Antigen).      Reviewed previous notes from Dr. Moe    Miller is a known liver transplant recipient who has been following up with his primary care for his PSAs.  He has been regularly checking his PSAs since his 30s in view of his father's prostate cancer about 25 years back.  His father received radiation therapy and has been doing well since he is currently 86.  Miller also does report some nocturia about 2-3 times in the night.  He also has some issues with flow but he is not bothered significantly by it.  Of note he is taking tamsulosin.  No recent history of catheterization or urinary instrumentation of note PSA has been elevated from 0.4 2 years ago to 1.2 at this instance.  And he is here to address this  concern.             Past Medical History:     Past Medical History:   Diagnosis Date     Anemia 2013     Arthritis      BPH (benign prostatic hyperplasia)      CAD (coronary artery disease) 4/1/2019     Cholelithiasis      Conductive hearing loss 08/16/2017     Depressive disorder 1986    Suffer effects throughout life     Gastroesophageal reflux disease 12/01/2014     HCC (hepatocellular carcinoma) (H) 1/22/2019     History of diabetic retinopathy 07/2018     HTN (hypertension)      HTN (hypertension) 11/20/2019     Hyperlipidemia      Liver cirrhosis secondary to ESTRADA (H)      Liver transplanted (H) 11/11/2019     Portal vein thrombosis 4/11/2019     Type II diabetes mellitus (H)             Past Surgical History:     Past Surgical History:   Procedure Laterality Date     COLONOSCOPY      2015     COLONOSCOPY N/A 12/6/2019    Procedure: COLONOSCOPY, WITH POLYPECTOMY AND BIOPSY;  Surgeon: Adam Morton MD;  Location: [facility]     CV HEART CATHETERIZATION WITH POSSIBLE INTERVENTION N/A 2/26/2019    Procedure: CORS;  Surgeon: Jagdish Hoyt MD;  Location:  HEART CARDIAC CATH LAB     ESOPHAGOSCOPY, GASTROSCOPY, DUODENOSCOPY (EGD), COMBINED N/A 11/17/2016    Procedure: COMBINED ESOPHAGOSCOPY, GASTROSCOPY, DUODENOSCOPY (EGD);  Surgeon: Santi Rosas MD;  Location: [facility]     ESOPHAGOSCOPY, GASTROSCOPY, DUODENOSCOPY (EGD), COMBINED N/A 11/17/2017    Procedure: COMBINED ESOPHAGOSCOPY, GASTROSCOPY, DUODENOSCOPY (EGD);  EGD;  Surgeon: Santi Rosas MD;  Location: [facility]     ESOPHAGOSCOPY, GASTROSCOPY, DUODENOSCOPY (EGD), COMBINED N/A 12/28/2018    Procedure: EGD;  Surgeon: Santi Rosas MD;  [facility]     ESOPHAGOSCOPY, GASTROSCOPY, DUODENOSCOPY (EGD), COMBINED N/A 12/6/2019    Procedure: ESOPHAGOGASTRODUODENOSCOPY, WITH BIOPSY;  Surgeon: Adam Morton MD;  Location: [facility]     ESOPHAGOSCOPY, GASTROSCOPY, DUODENOSCOPY (EGD), COMBINED N/A 2/13/2020    Procedure:  ESOPHAGOGASTRODUODENOSCOPY (EGD);  Surgeon: Santi Rosas MD;  Location: [facility]     HEAD & NECK SURGERY      2017 at [facility].      IMPLANT GOLD WEIGHT EYELID Right 2017    Procedure: IMPLANT WEIGHT EYELID;  Right Upper Eyelid Weight, right tarsal strip lower eyelid;  Surgeon: Milana Malave MD;  [facility]     IR CHEMO EMBOLIZATION  2019     KNEE SURGERY Left      ORTHOPEDIC SURGERY       PAROTIDECTOMY, RADICAL NECK DISSECTION Right 2017    Procedure: PAROTIDECTOMY, RADICAL NECK DISSECTION;  Right Superfacial Parotidectomy , Facial nerve repair. with [facility] facial nerve monitor.;  Surgeon: Asiya Morgan MD;  Location: [facility]     PICC INSERTION Left 2017    4fr SL BioFlo PICC, 44cm in the L basilic vein w/ tip in the low SVC     RETURN LIVER TRANSPLANT N/A 2019    Procedure: Exploratory laparotomy, hematoma evacuation, abdominal washout;  Surgeon: Александр Ramos MD;  Location: [facility]     TRANSPLANT LIVER RECIPIENT  DONOR N/A 2019    Procedure: TRANSPLANT, LIVER, RECIPIENT,  DONOR;  Surgeon: Александр Ramos MD;  Location: [facility]     VASCULAR SURGERY              Medications     Current Outpatient Medications   Medication     Acetaminophen (TYLENOL) 325 MG CAPS     ARIPiprazole (ABILIFY) 5 MG tablet     Artificial Tear Solution (SM ARTIFICIAL TEARS) SOLN     aspirin 81 MG EC tablet     BD VIKTORIA U/F 32G X 4 MM insulin pen needle     carvedilol (COREG) 3.125 MG tablet     ciclopirox (LOPROX) 0.77 % cream     Continuous Blood Gluc  (FREESTYLE CLAUDIA 14 DAY READER) CECILIO     Continuous Blood Gluc Sensor (FREESTYLE CLAUDIA 14 DAY SENSOR) MISC     empagliflozin (JARDIANCE) 25 MG TABS tablet     gabapentin (NEURONTIN) 300 MG capsule     glucose (BD GLUCOSE) 4 g chewable tablet     insulin aspart (NOVOLOG PEN) 100 UNIT/ML pen     insulin degludec (TRESIBA FLEXTOUCH) 100 UNIT/ML pen     lamiVUDine (EPIVIR) 100 MG tablet     Multiple Vitamin (THERAVITE  PO)     mycophenolate (GENERIC EQUIVALENT) 250 MG capsule     order for DME     pantoprazole (PROTONIX) 40 MG EC tablet     polyethylene glycol (MIRALAX) 17 g packet     predniSONE (DELTASONE) 5 MG tablet     rosuvastatin (CRESTOR) 5 MG tablet     tamsulosin (FLOMAX) 0.4 MG capsule     vitamin B-12 (CYANOCOBALAMIN) 1000 MCG tablet     vitamin D3 (CHOLECALCIFEROL) 50 mcg (2000 units) tablet     zolpidem (AMBIEN) 10 MG tablet     Current Facility-Administered Medications   Medication     aflibercept (EYLEA) injection prefilled syringe 2 mg            Family History:     Family History   Problem Relation Age of Onset     Prostate Cancer Maternal Grandfather      Substance Abuse Maternal Grandfather         Alcohol     Colon Cancer Father 60     Pancreatic Cancer Father 60     Prostate Cancer Father      Colorectal Cancer Father      Macular Degeneration Father      Cancer Father      Glaucoma Father      Skin Cancer Father      Colorectal Cancer Maternal Grandmother      Cancer Maternal Grandmother      Substance Abuse Maternal Grandmother         Alcohol     Colorectal Cancer Paternal Grandmother      Cancer Mother      Diabetes Mother          3/2016     Cerebrovascular Disease Mother         Passed away in Feb of this year, 80 years old.     Thyroid Disease Mother      Depression Mother      Asthma Sister         Had since birth     Thyroid Disease Sister      Depression Sister      Liver Disease No family hx of      Melanoma No family hx of             Social History:     Social History     Socioeconomic History     Marital status:      Spouse name: Not on file     Number of children: Not on file     Years of education: Not on file     Highest education level: Bachelor's degree (e.g., BA, AB, BS)   Occupational History     Not on file   Social Needs     Financial resource strain: Not very hard     Food insecurity     Worry: Never true     Inability: Never true     Transportation needs     Medical: No      Non-medical: No   Tobacco Use     Smoking status: Former Smoker     Packs/day: 6.00     Years: 30.00     Pack years: 180.00     Types: Cigars     Start date: 5/1/2016     Quit date: 10/25/2017     Years since quitting: 3.5     Smokeless tobacco: Former User     Types: Chew     Quit date: 10/31/2017     Tobacco comment: 1 tin per week   Substance and Sexual Activity     Alcohol use: No     Alcohol/week: 0.0 standard drinks     Frequency: Never     Drinks per session: Patient refused     Binge frequency: Never     Comment: quit Sept. 1996     Drug use: No     Sexual activity: Not Currently     Partners: Female     Birth control/protection: Condom   Lifestyle     Physical activity     Days per week: 1 day     Minutes per session: 60 min     Stress: To some extent   Relationships     Social connections     Talks on phone: Once a week     Gets together: Once a week     Attends Congregation service: Never     Active member of club or organization: No     Attends meetings of clubs or organizations: Never     Relationship status:      Intimate partner violence     Fear of current or ex partner: Not on file     Emotionally abused: Not on file     Physically abused: Not on file     Forced sexual activity: Not on file   Other Topics Concern     Parent/sibling w/ CABG, MI or angioplasty before 65F 55M? Yes   Social History Narrative    Prior , [place]            Allergies:   Codeine and Seasonal allergies         Review of Systems:  From intake questionnaire     Skin: negative  Eyes: negative  Ears/Nose/Throat: negative  Respiratory: No shortness of breath, dyspnea on exertion, cough, or hemoptysis  Cardiovascular: No chest pain or palpitations  Gastrointestinal: negative; no nausea/vomiting, constipation or diarrhea  Genitourinary: as per HPI  Musculoskeletal: negative  Neurologic: negative  Psychiatric: negative  Hematologic/Lymphatic/Immunologic: negative  Endocrine: negative         Physical Exam:   This  is a virtual visit    Alert, no acute distress, oriented, conversant    Ears/nose/mouth: mouth:normal, good dentition  Respiratory: no respiratory distress, or pursed lip breathing  Cardiovascular:no obvious jugular venous distension present  Skin: no suspicious lesions or rashes on Visible body parts on the Screen  Neuro: Alert, oriented, speech and mentation normal  Psych: affect and mood normal, alert and oriented to person, place and time      Labs and Pathology:    The following labs were reviewed by me and discussed with the patient:    Significant for   Lab Results   Component Value Date    CR 1.52 05/05/2021    CR 1.55 04/20/2021    CR 1.54 03/26/2021    CR 1.82 02/26/2021    CR 1.62 01/20/2021    CR 1.79 12/30/2020    CR 1.55 12/02/2020    CR 1.74 11/11/2020    CR 1.95 10/14/2020    CR 1.48 09/25/2020     PSA   Date Value Ref Range Status   04/20/2021 1.21 0 - 4 ug/L Final     Comment:     Assay Method:  Chemiluminescence using Siemens Vista analyzer   02/04/2019 0.40 0 - 4 ug/L Final     Comment:     Assay Method:  Chemiluminescence using Siemens Vista analyzer             Imaging:    The following imaging exams were independently viewed and interpreted by me and discussed with patient:  CT abdomen pelvis: 3/26/2021  Prominent prostate seen on the CT.             Assessment and Plan:     Assessment:     Elevated prostate specific antigen (PSA)  He has documented a recent rise in the PSA from 0.4-1.2.  The level of the PSA rise is still within the normal range but Miller does endorse a family history of prostate cancer (dad).  He has been having recent change in urine symptoms and he is currently on tamsulosin.  His CT scan done in March did show an enlarged prominent prostate.  We discussed about the reason for the rising PSA.  In the event of a significant worsening of urinary symptoms it could represent a rise in the size of the prostate or it could also represent a possible malignant  pathology, which is  less likely.  However, his history of prostate cancer in his family makes him at high risk and we will keep a close eye on his PSA.  We will repeat the PSA in 6 months and assess the velocity of PSA rise and consider further evaluation if necessary.  - UROLOGY ADULT REFERRAL  - PSA tumor marker; Future    Liver transplant recipient (H)  On immunosuppressants, may have an increased risk of prostate cancer in general in view of renal suppression.  We will continue monitoring PSA      Plan:  Review in 6 months with PSA and clinical examination    Orders  Orders Placed This Encounter   Procedures     PSA tumor marker         Adan Heller MD  [facility] [place]                  ==========================    Additional Billing and Coding Information:  Review of external notes as documented above   Review of the result(s) of each unique test - PSA    Independent interpretation of a test performed by another physician/other qualified health care professional (not separately reported) -CT abdomen pelvis    Discussion of management or test interpretation with external physician/other qualified healthcare professional/appropriate source -not applicable    Diagnosis or treatment significantly limited by social determinants of health -not applicable    22  minutes spent on the date of the encounter doing chart review, review of test results, interpretation of tests, patient visit and documentation     ==========================

## 2021-05-10 ENCOUNTER — TELEPHONE (OUTPATIENT)
Dept: UROLOGY | Facility: CLINIC | Age: 56
End: 2021-05-10

## 2021-05-10 NOTE — TELEPHONE ENCOUNTER
LVM for patient to Follow-up in 6 months with PSA for an in person visit.  We will plan to do a rectal examination of the time as well

## 2021-05-12 ENCOUNTER — OFFICE VISIT (OUTPATIENT)
Dept: OPHTHALMOLOGY | Facility: CLINIC | Age: 56
End: 2021-05-12
Attending: OPHTHALMOLOGY
Payer: MEDICARE

## 2021-05-12 DIAGNOSIS — E11.3313 TYPE 2 DIABETES MELLITUS WITH MODERATE NONPROLIFERATIVE RETINOPATHY OF BOTH EYES AND MACULAR EDEMA, UNSPECIFIED WHETHER LONG TERM INSULIN USE (H): ICD-10-CM

## 2021-05-12 PROCEDURE — 67028 INJECTION EYE DRUG: CPT | Mod: LT | Performed by: OPHTHALMOLOGY

## 2021-05-12 PROCEDURE — G0463 HOSPITAL OUTPT CLINIC VISIT: HCPCS

## 2021-05-12 PROCEDURE — 92134 CPTRZ OPH DX IMG PST SGM RTA: CPT | Performed by: OPHTHALMOLOGY

## 2021-05-12 PROCEDURE — 250N000011 HC RX IP 250 OP 636: Performed by: OPHTHALMOLOGY

## 2021-05-12 PROCEDURE — 67028 INJECTION EYE DRUG: CPT | Mod: RT | Performed by: OPHTHALMOLOGY

## 2021-05-12 RX ADMIN — AFLIBERCEPT 2 MG: 40 INJECTION, SOLUTION INTRAVITREAL at 10:33

## 2021-05-12 ASSESSMENT — CUP TO DISC RATIO
OD_RATIO: 0.25
OS_RATIO: 0.3

## 2021-05-12 ASSESSMENT — EXTERNAL EXAM - RIGHT EYE: OD_EXAM: NORMAL

## 2021-05-12 ASSESSMENT — REFRACTION_WEARINGRX
OS_CYLINDER: +0.50
SPECS_TYPE: PAL
OD_AXIS: 131
OS_AXIS: 044
OD_CYLINDER: +0.75
OS_SPHERE: -6.75
OD_SPHERE: -7.00
OS_ADD: +2.00
OD_ADD: +2.00

## 2021-05-12 ASSESSMENT — TONOMETRY
OS_IOP_MMHG: 15
OD_IOP_MMHG: 16
IOP_METHOD: TONOPEN

## 2021-05-12 ASSESSMENT — CONF VISUAL FIELD
OD_NORMAL: 1
METHOD: COUNTING FINGERS
OS_NORMAL: 1

## 2021-05-12 ASSESSMENT — VISUAL ACUITY
CORRECTION_TYPE: GLASSES
OS_CC: 20/30
METHOD: SNELLEN - LINEAR
OD_CC: 20/30

## 2021-05-12 ASSESSMENT — SLIT LAMP EXAM - LIDS
COMMENTS: NORMAL
COMMENTS: SMALL PAPILLOMA ALONG LL MARGIN

## 2021-05-12 ASSESSMENT — EXTERNAL EXAM - LEFT EYE: OS_EXAM: NORMAL

## 2021-05-12 NOTE — NURSING NOTE
Chief Complaints and History of Present Illnesses   Patient presents with     Diabetic Macular Edema Follow Up     2 month follow up      Chief Complaint(s) and History of Present Illness(es)     Diabetic Macular Edema Follow Up     Comments: 2 month follow up               Comments     Pt states vision is worse in RE due to allergies. Pt having a lot of itching and redness in both eyes.  No eye pain today. No flashes or floaters.  DM2 BS: 123 this morning per pt.  Lab Results       Component                Value               Date                  A1C: 6.7 taken about 1 month ago per pt.       A1C                      6.0                 12/30/2020                 A1C                      6.3                 06/13/2020                 A1C                      6.6                 08/08/2018                 A1C                      6.5                 06/09/2017                 A1C                      7.8                 10/25/2016              LEX Arceo May 12, 2021 9:16 AM

## 2021-05-12 NOTE — PROGRESS NOTES
CC:  Follow up Diabetic macular edema      HPI: Frandy Workman is a  56 year old year-old patient with history of Diabetes mellitus for 24 ys . Patient on insulin.   Interval History: Vision is stable. Eyes feel itchy, maybe having allergies.     Hemoglobin A1C   Date Value Ref Range Status   12/30/2020 6.0 (H) 0 - 5.6 % Final     Comment:     Normal <5.7% Prediabetes 5.7-6.4%  Diabetes 6.5% or higher - adopted from ADA   consensus guidelines.         Retinal Imaging:  OCT 5-12-21  RE: worse Diabetic macular edema. Vitreous attached  LE: improved Diabetic macular edema, mild Epiretinal membrane    fluorescein angiography transits right eye 11-25-20  Right eye: diffuse microaneurysms, late mild macular leakage; peripheral peripheral capillary non perfusion;   vessel leakage in late phase, no NVD/NVE vs early SN neovascularization elsewhere??  Left eye: diffuse microaneurysms, late mild macula leakage; mild peripheral capillary non perfusion;  mild vessel leakage in late phase, no NVD/NVE    Optos consistent with clinical exam    Assessment & Plan:  # Diabetic macular edema both eyes    - status post avastin x 4. Switch to Eylea 4/24/19 with improvement of Diabetic macular edema    - now with worsen Diabetic macular edema right eye ; left eye improved   - Last Eylea injection was 3/2021 both eyes    - plan for Eylea inj both eyes  and follow up in 2 months with Optical Coherence Tomography     # severe nonproliferative diabetic retinopathy of both eyes    - HbA1c 6.3% (6/2020)   - fluorescein angiography with increased peripheral leakage and capillary non perfusion compared to march, 2019   - Blood pressure (<120/80) and blood glucose (HbA1c <7.0, ~6.5 today) control discussed with patient. Patient advised that failure to adequately control each may lead to vision loss. The patient expressed understanding.   - observe for now and could consider peripheral laser if worsening      # Myopia, bilateral  Prescription  given today (10/9/19)     #. Senile nuclear sclerosis, bilateral  Comment: Not visually significant [facility]    # Dry eye syndrome   Left eye worse than right eye   warm compresses and artificial tears  As needed  -punctal plugs previously left eye - reports this is better     # Status post liver transplant   - tx 11/2019   - severe anemia; s/p transfusion - anemia much improved    - being seen by his primary team    Plan: follow up in 2 months, possible injection    Carrington Dsouza MD  Ophthalmology PGY-3    ~~~~~~~~~~~~~~~~~~~~~~~~~~~~~~~~~~   Complete documentation of historical and exam elements from today's encounter can be found in the full encounter summary report (not reduplicated in this progress note).  I personally obtained the chief complaint(s) and history of present illness.  I confirmed and edited as necessary the review of systems, past medical/surgical history, family history, social history, and examination findings as documented by others; and I examined the patient myself.  I personally reviewed the relevant tests, images, and reports as documented above.  I personally reviewed the ophthalmic test(s) associated with this encounter, agree with the interpretation(s) as documented by the resident/fellow, and have edited the corresponding report(s) as necessary.   I formulated and edited as necessary the assessment and plan and discussed the findings and management plan with the patient and family and I was present for the entire procedure performed by the resident/fellow.    Enriqueta Martin MD   of Ophthalmology.  Retina Service   Department of Ophthalmology and Visual Neurosciences   [facility]  Phone: [phone]   Fax: [phone]

## 2021-05-13 ENCOUNTER — OFFICE VISIT (OUTPATIENT)
Dept: ENDOCRINOLOGY | Facility: CLINIC | Age: 56
End: 2021-05-13
Payer: MEDICARE

## 2021-05-13 DIAGNOSIS — M21.969 TYPE 2 DIABETES MELLITUS WITH DIABETIC FOOT DEFORMITY (H): ICD-10-CM

## 2021-05-13 DIAGNOSIS — L60.2 ONYCHAUXIS: ICD-10-CM

## 2021-05-13 DIAGNOSIS — E11.69 TYPE 2 DIABETES MELLITUS WITH DIABETIC FOOT DEFORMITY (H): ICD-10-CM

## 2021-05-13 DIAGNOSIS — E11.49 TYPE II OR UNSPECIFIED TYPE DIABETES MELLITUS WITH NEUROLOGICAL MANIFESTATIONS, NOT STATED AS UNCONTROLLED(250.60) (H): Primary | ICD-10-CM

## 2021-05-13 PROCEDURE — 99214 OFFICE O/P EST MOD 30 MIN: CPT | Performed by: PODIATRIST

## 2021-05-13 NOTE — LETTER
5/13/2021       RE: Frandy Workman  [address] [facility] [id number]     Dear Colleague,    Thank you for referring your patient, Frandy Workman, to the [facility] [place] at [facility]. Please see a copy of my visit note below.    Past Medical History:   Diagnosis Date     Anemia 2013     Arthritis      BPH (benign prostatic hyperplasia)      CAD (coronary artery disease) 4/1/2019     Cholelithiasis      Conductive hearing loss 08/16/2017     Depressive disorder 1986    Suffer effects throughout life     Gastroesophageal reflux disease 12/01/2014     HCC (hepatocellular carcinoma) (H) 1/22/2019     History of diabetic retinopathy 07/2018     HTN (hypertension)      HTN (hypertension) 11/20/2019     Hyperlipidemia      Liver cirrhosis secondary to ESTRADA (H)      Liver transplanted (H) 11/11/2019     Portal vein thrombosis 4/11/2019     Type II diabetes mellitus (H)      Patient Active Problem List   Diagnosis     Bipolar affective disorder in remission (H)     Brow ptosis     Paralytic lagophthalmos of right upper eyelid     Erectile dysfunction due to diseases classified elsewhere     HCC (hepatocellular carcinoma) (H)     Equivocal stress echocardiogram     Type 2 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, unspecified whether long term insulin use (H)     Status post coronary angiogram     CAD (coronary artery disease)     Liver transplant recipient (H)     Status post liver transplantation (H)     Immunosuppressed status (H)     Benign essential hypertension     Malnutrition related to chronic disease (H)     Hypophosphatasia     Chronic kidney disease, stage 3a     Malnutrition (H)     Anemia     Anxiety     Mild recurrent major depression (H)     Low hemoglobin     CKD (chronic kidney disease) stage 4, GFR 15-29 ml/min (H)     Anemia of chronic renal failure, stage 3a      Rekha (H)     History of coronary artery disease     Dyslipidemia     Constipation, unspecified constipation type     Excessive sweating     Past Surgical History:   Procedure Laterality Date     COLONOSCOPY      2015     COLONOSCOPY N/A 12/6/2019    Procedure: COLONOSCOPY, WITH POLYPECTOMY AND BIOPSY;  Surgeon: Adam Morton MD;  Location: [facility]     CV HEART CATHETERIZATION WITH POSSIBLE INTERVENTION N/A 2/26/2019    Procedure: CORS;  Surgeon: Jagdish Hoyt MD;  Location:  HEART CARDIAC CATH LAB     ESOPHAGOSCOPY, GASTROSCOPY, DUODENOSCOPY (EGD), COMBINED N/A 11/17/2016    Procedure: COMBINED ESOPHAGOSCOPY, GASTROSCOPY, DUODENOSCOPY (EGD);  Surgeon: Santi Rosas MD;  Location:  GI     ESOPHAGOSCOPY, GASTROSCOPY, DUODENOSCOPY (EGD), COMBINED N/A 11/17/2017    Procedure: COMBINED ESOPHAGOSCOPY, GASTROSCOPY, DUODENOSCOPY (EGD);  EGD;  Surgeon: Santi Rosas MD;  Location:  GI     ESOPHAGOSCOPY, GASTROSCOPY, DUODENOSCOPY (EGD), COMBINED N/A 12/28/2018    Procedure: EGD;  Surgeon: Santi Rosas MD;  Location:  OR     ESOPHAGOSCOPY, GASTROSCOPY, DUODENOSCOPY (EGD), COMBINED N/A 12/6/2019    Procedure: ESOPHAGOGASTRODUODENOSCOPY, WITH BIOPSY;  Surgeon: Adam Morton MD;  Location:  GI     ESOPHAGOSCOPY, GASTROSCOPY, DUODENOSCOPY (EGD), COMBINED N/A 2/13/2020    Procedure: ESOPHAGOGASTRODUODENOSCOPY (EGD);  Surgeon: Santi Rosas MD;  Location:  GI     HEAD & NECK SURGERY      12/2017 at [facility].      IMPLANT GOLD WEIGHT EYELID Right 11/16/2017    Procedure: IMPLANT WEIGHT EYELID;  Right Upper Eyelid Weight, right tarsal strip lower eyelid;  Surgeon: Milana Malave MD;  [facility]     IR CHEMO EMBOLIZATION  1/22/2019     KNEE SURGERY Left      ORTHOPEDIC SURGERY       PAROTIDECTOMY, RADICAL NECK DISSECTION Right 11/2/2017    Procedure: PAROTIDECTOMY, RADICAL NECK DISSECTION;  Right Superfacial Parotidectomy , Facial nerve repair. with [facility] facial  nerve monitor.;  Surgeon: Asiya Morgan MD;  Location: [facility] OR     PICC INSERTION Left 2017    4fr SL BioFlo PICC, 44cm in the L basilic vein w/ tip in the low SVC     RETURN LIVER TRANSPLANT N/A 2019    Procedure: Exploratory laparotomy, hematoma evacuation, abdominal washout;  Surgeon: Александр Ramos MD;  Location: [facility] OR     TRANSPLANT LIVER RECIPIENT  DONOR N/A 2019    Procedure: TRANSPLANT, LIVER, RECIPIENT,  DONOR;  Surgeon: Александр Ramos MD;  Location: [facility] OR     VASCULAR SURGERY       Social History     Socioeconomic History     Marital status:      Spouse name: Not on file     Number of children: Not on file     Years of education: Not on file     Highest education level: Bachelor's degree (e.g., BA, AB, BS)   Occupational History     Not on file   Social Needs     Financial resource strain: Not very hard     Food insecurity     Worry: Never true     Inability: Never true     Transportation needs     Medical: No     Non-medical: No   Tobacco Use     Smoking status: Former Smoker     Packs/day: 6.00     Years: 30.00     Pack years: 180.00     Types: Cigars     Start date: 2016     Quit date: 10/25/2017     Years since quitting: 3.5     Smokeless tobacco: Former User     Types: Chew     Quit date: 10/31/2017     Tobacco comment: 1 tin per week   Substance and Sexual Activity     Alcohol use: No     Alcohol/week: 0.0 standard drinks     Frequency: Never     Drinks per session: Patient refused     Binge frequency: Never     Comment: quit 1996     Drug use: No     Sexual activity: Not Currently     Partners: Female     Birth control/protection: Condom   Lifestyle     Physical activity     Days per week: 1 day     Minutes per session: 60 min     Stress: To some extent   Relationships     Social connections     Talks on phone: Once a week     Gets together: Once a week     Attends [employer] service: Never     Active member of club or organization: No      Attends meetings of clubs or organizations: Never     Relationship status:      Intimate partner violence     Fear of current or ex partner: Not on file     Emotionally abused: Not on file     Physically abused: Not on file     Forced sexual activity: Not on file   Other Topics Concern     Parent/sibling w/ CABG, MI or angioplasty before 65F 55M? Yes   Social History Narrative    Prior , [place]     Family History   Problem Relation Age of Onset     Prostate Cancer Maternal Grandfather      Substance Abuse Maternal Grandfather         Alcohol     Colon Cancer Father 60     Pancreatic Cancer Father 60     Prostate Cancer Father      Colorectal Cancer Father      Macular Degeneration Father      Cancer Father      Glaucoma Father      Skin Cancer Father      Colorectal Cancer Maternal Grandmother      Cancer Maternal Grandmother      Substance Abuse Maternal Grandmother         Alcohol     Colorectal Cancer Paternal Grandmother      Cancer Mother      Diabetes Mother          3/2016     Cerebrovascular Disease Mother         Passed away in Feb of this year, 80 years old.     Thyroid Disease Mother      Depression Mother      Asthma Sister         Had since birth     Thyroid Disease Sister      Depression Sister      Liver Disease No family hx of      Melanoma No family hx of      SUBJECTIVE FINDINGS:  A 56-year-old male returns to clinic for foot check.  He relates he is doing okay.  Relates he has numbness, tingling and neuropathy that he feels has worsened on the left 5th toe.  Relates no ulcers or sores since we have seen him last.  Relates he needs his toenails trimmed.  No other specific relieving or aggravating factors.  He does have diabetic shoes and needs new prescription.       OBJECTIVE FINDINGS:  DP and PT 2/4 bilaterally.  He has dorsally contracted digits 2-5 bilaterally.  He has incurvated nails with some thickening and dystrophy 1-5 bilaterally to differing degrees.  There is  no erythema, no drainage, no odor, no calor bilaterally.  Deep tendon reflexes are intact bilaterally.  There are no gross tendon voids bilaterally.  He has a laterally deviated hallux bilaterally.       ASSESSMENT/PLAN:  Onychauxis bilaterally.  He is diabetic with peripheral neuropathy and foot deformity.  His protective sensation is intact.  Diagnosis and treatment options discussed with him.  All the nails were reduced bilaterally upon consent.  Advised him on toe stretching.  He will return to clinic and see me in about 3 months.       Again, thank you for allowing me to participate in the care of your patient.      Sincerely,    Lamin Gonzalez DPM

## 2021-05-13 NOTE — PROGRESS NOTES
Past Medical History:   Diagnosis Date     Anemia 2013     Arthritis      BPH (benign prostatic hyperplasia)      CAD (coronary artery disease) 4/1/2019     Cholelithiasis      Conductive hearing loss 08/16/2017     Depressive disorder 1986    Suffer effects throughout life     Gastroesophageal reflux disease 12/01/2014     HCC (hepatocellular carcinoma) (H) 1/22/2019     History of diabetic retinopathy 07/2018     HTN (hypertension)      HTN (hypertension) 11/20/2019     Hyperlipidemia      Liver cirrhosis secondary to ESTRADA (H)      Liver transplanted (H) 11/11/2019     Portal vein thrombosis 4/11/2019     Type II diabetes mellitus (H)      Patient Active Problem List   Diagnosis     Bipolar affective disorder in remission (H)     Brow ptosis     Paralytic lagophthalmos of right upper eyelid     Erectile dysfunction due to diseases classified elsewhere     HCC (hepatocellular carcinoma) (H)     Equivocal stress echocardiogram     Type 2 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, unspecified whether long term insulin use (H)     Status post coronary angiogram     CAD (coronary artery disease)     Liver transplant recipient (H)     Status post liver transplantation (H)     Immunosuppressed status (H)     Benign essential hypertension     Malnutrition related to chronic disease (H)     Hypophosphatasia     Chronic kidney disease, stage 3a     Malnutrition (H)     Anemia     Anxiety     Mild recurrent major depression (H)     Low hemoglobin     CKD (chronic kidney disease) stage 4, GFR 15-29 ml/min (H)     Anemia of chronic renal failure, stage 3a     Rekha (H)     History of coronary artery disease     Dyslipidemia     Constipation, unspecified constipation type     Excessive sweating     Past Surgical History:   Procedure Laterality Date     COLONOSCOPY      2015     COLONOSCOPY N/A 12/6/2019    Procedure: COLONOSCOPY, WITH POLYPECTOMY AND BIOPSY;  Surgeon: Selene  MD Adam;  Location:  GI     CV HEART CATHETERIZATION WITH POSSIBLE INTERVENTION N/A 2/26/2019    Procedure: CORS;  Surgeon: Jagdish Hoyt MD;  Location:  HEART CARDIAC CATH LAB     ESOPHAGOSCOPY, GASTROSCOPY, DUODENOSCOPY (EGD), COMBINED N/A 11/17/2016    Procedure: COMBINED ESOPHAGOSCOPY, GASTROSCOPY, DUODENOSCOPY (EGD);  Surgeon: Santi Rosas MD;  Location:  GI     ESOPHAGOSCOPY, GASTROSCOPY, DUODENOSCOPY (EGD), COMBINED N/A 11/17/2017    Procedure: COMBINED ESOPHAGOSCOPY, GASTROSCOPY, DUODENOSCOPY (EGD);  EGD;  Surgeon: Santi Rosas MD;  Location:  GI     ESOPHAGOSCOPY, GASTROSCOPY, DUODENOSCOPY (EGD), COMBINED N/A 12/28/2018    Procedure: EGD;  Surgeon: Santi Rosas MD;  [facility]     ESOPHAGOSCOPY, GASTROSCOPY, DUODENOSCOPY (EGD), COMBINED N/A 12/6/2019    Procedure: ESOPHAGOGASTRODUODENOSCOPY, WITH BIOPSY;  Surgeon: Adam Morton MD;  Location:  GI     ESOPHAGOSCOPY, GASTROSCOPY, DUODENOSCOPY (EGD), COMBINED N/A 2/13/2020    Procedure: ESOPHAGOGASTRODUODENOSCOPY (EGD);  Surgeon: Santi Rosas MD;  Location:  GI     HEAD & NECK SURGERY      12/2017 at [facility].      IMPLANT GOLD WEIGHT EYELID Right 11/16/2017    Procedure: IMPLANT WEIGHT EYELID;  Right Upper Eyelid Weight, right tarsal strip lower eyelid;  Surgeon: Milana Malave MD;  [facility]     IR CHEMO EMBOLIZATION  1/22/2019     KNEE SURGERY Left      ORTHOPEDIC SURGERY       PAROTIDECTOMY, RADICAL NECK DISSECTION Right 11/2/2017    Procedure: PAROTIDECTOMY, RADICAL NECK DISSECTION;  Right Superfacial Parotidectomy , Facial nerve repair. with [facility] facial nerve monitor.;  Surgeon: Asiya Morgan MD;  Location: [facility] OR     PICC INSERTION Left 11/06/2017    4fr SL BioFlo PICC, 44cm in the L basilic vein w/ tip in the low SVC     RETURN LIVER TRANSPLANT N/A 11/12/2019    Procedure: Exploratory laparotomy, hematoma evacuation, abdominal washout;  Surgeon: Александр Ramos MD;   Location: [facility] OR     TRANSPLANT LIVER RECIPIENT  DONOR N/A 2019    Procedure: TRANSPLANT, LIVER, RECIPIENT,  DONOR;  Surgeon: Александр Ramos MD;  Location: [facility] OR     VASCULAR SURGERY       Social History     Socioeconomic History     Marital status:      Spouse name: Not on file     Number of children: Not on file     Years of education: Not on file     Highest education level: Bachelor's degree (e.g., BA, AB, BS)   Occupational History     Not on file   Social Needs     Financial resource strain: Not very hard     Food insecurity     Worry: Never true     Inability: Never true     Transportation needs     Medical: No     Non-medical: No   Tobacco Use     Smoking status: Former Smoker     Packs/day: 6.00     Years: 30.00     Pack years: 180.00     Types: Cigars     Start date: 2016     Quit date: 10/25/2017     Years since quitting: 3.5     Smokeless tobacco: Former User     Types: Chew     Quit date: 10/31/2017     Tobacco comment: 1 tin per week   Substance and Sexual Activity     Alcohol use: No     Alcohol/week: 0.0 standard drinks     Frequency: Never     Drinks per session: Patient refused     Binge frequency: Never     Comment: quit 1996     Drug use: No     Sexual activity: Not Currently     Partners: Female     Birth control/protection: Condom   Lifestyle     Physical activity     Days per week: 1 day     Minutes per session: 60 min     Stress: To some extent   Relationships     Social connections     Talks on phone: Once a week     Gets together: Once a week     Attends [employer] service: Never     Active member of club or organization: No     Attends meetings of clubs or organizations: Never     Relationship status:      Intimate partner violence     Fear of current or ex partner: Not on file     Emotionally abused: Not on file     Physically abused: Not on file     Forced sexual activity: Not on file   Other Topics Concern     Parent/sibling w/ CABG, MI or  angioplasty before 65F 55M? Yes   Social History Narrative    Prior , [place]     Family History   Problem Relation Age of Onset     Prostate Cancer Maternal Grandfather      Substance Abuse Maternal Grandfather         Alcohol     Colon Cancer Father 60     Pancreatic Cancer Father 60     Prostate Cancer Father      Colorectal Cancer Father      Macular Degeneration Father      Cancer Father      Glaucoma Father      Skin Cancer Father      Colorectal Cancer Maternal Grandmother      Cancer Maternal Grandmother      Substance Abuse Maternal Grandmother         Alcohol     Colorectal Cancer Paternal Grandmother      Cancer Mother      Diabetes Mother          3/2016     Cerebrovascular Disease Mother         Passed away in Feb of this year, 80 years old.     Thyroid Disease Mother      Depression Mother      Asthma Sister         Had since birth     Thyroid Disease Sister      Depression Sister      Liver Disease No family hx of      Melanoma No family hx of      SUBJECTIVE FINDINGS:  A 56-year-old male returns to clinic for foot check.  He relates he is doing okay.  Relates he has numbness, tingling and neuropathy that he feels has worsened on the left 5th toe.  Relates no ulcers or sores since we have seen him last.  Relates he needs his toenails trimmed.  No other specific relieving or aggravating factors.  He does have diabetic shoes and needs new prescription.       OBJECTIVE FINDINGS:  DP and PT 2/4 bilaterally.  He has dorsally contracted digits 2-5 bilaterally.  He has incurvated nails with some thickening and dystrophy 1-5 bilaterally to differing degrees.  There is no erythema, no drainage, no odor, no calor bilaterally.  Deep tendon reflexes are intact bilaterally.  There are no gross tendon voids bilaterally.  He has a laterally deviated hallux bilaterally.       ASSESSMENT/PLAN:  Onychauxis bilaterally.  He is diabetic with peripheral neuropathy and foot deformity.  His protective  sensation is intact.  Diagnosis and treatment options discussed with him.  All the nails were reduced bilaterally upon consent.  Advised him on toe stretching.  He will return to clinic and see me in about 3 months.

## 2021-05-20 ENCOUNTER — VIRTUAL VISIT (OUTPATIENT)
Dept: BEHAVIORAL HEALTH | Facility: CLINIC | Age: 56
End: 2021-05-20
Payer: MEDICARE

## 2021-05-20 DIAGNOSIS — F41.1 GENERALIZED ANXIETY DISORDER: ICD-10-CM

## 2021-05-20 DIAGNOSIS — F31.31 BIPOLAR 1 DISORDER, DEPRESSED, MILD (H): Primary | ICD-10-CM

## 2021-05-20 PROCEDURE — 90834 PSYTX W PT 45 MINUTES: CPT | Mod: 95 | Performed by: PSYCHOLOGIST

## 2021-05-20 NOTE — PROGRESS NOTES
[facility]: Integrated Behavioral Health  May 20, 2021      Behavioral Health Clinician Progress Note    Patient Name: Frandy Workman           Service Type: Video visit      Service Location:  Video visit      Session Start Time: 2:00  Session End Time: 2:45       Session Length: 38 - 52      Attendees: Patient    Visit Activities (Refresh list every visit): [facility] Only     Telemedicine Visit: The patient's condition can be safely assessed and treated via synchronous audio and visual telemedicine encounter.      Reason for Telemedicine Visit: COVID-19    Originating Site (Patient Location): Patient's home    Distant Site (Provider Location): Provider Remote Setting    Consent:  The patient/guardian has verbally consented to: the potential risks and benefits of telemedicine (video visit) versus in person care; bill my insurance or make self-payment for services provided; and responsibility for payment of non-covered services.     Mode of Communication:  Video Conference via [employer]    As the provider I attest to compliance with applicable laws and regulations related to telemedicine.    Diagnostic Assessment Date: 12/03/2020  Treatment Plan Review Date:  7/7/2021  See Flowsheets for today's PHQ-9 and LIZZETTE-7 results  Previous PHQ-9:   PHQ-9 SCORE 10/13/2020 12/29/2020 4/7/2021   PHQ-9 Total Score MyChart 8 (Mild depression) 5 (Mild depression) 7 (Mild depression)   PHQ-9 Total Score 8 5 7     Previous LIZZETTE-7:   LIZZETTE-7 SCORE 10/13/2020 12/29/2020 4/7/2021   Total Score 6 (mild anxiety) 8 (mild anxiety) 9 (mild anxiety)   Total Score 6 8 9     Relationships   Social connections     Talks on phone: Once a week     Gets together: Once a week     Attends Buddhism service: Never     Active member of club or organization: No     Attends meetings of clubs or organizations: Never     Relationship status:       HEATH LEVEL:  No flowsheet data found.    DATA  Extended Session (60+  minutes): No  Interactive Complexity: No  Crisis: No    Treatment Objective(s) Addressed in This Session:  Target Behavior(s): disease management/lifestyle changes related to depressive and anxiety symptoms    Improving behavioral health indicators     Current Stressors / Issues:  Miller completed a follow-up visit today with this provider. He began with sharing some updates about goals he is working toward and how they have made him feel lately. [facility] provided support as he shared his goals and progress. Miller said he recently attended a transplant support group with [employer] and found this suprisingly helpful, especially hearing about other's experiences with transplant. He indicated he participated by sharing a little about himself, but mostly found it helpful to listen and learn from others' experiences. We talked about his plans to return and Miller indicated he feels this would be good for adding more support into his life which this provider supported. Miller also reports working on improving his daily exercise and he is up to walking about 3500 steps per day, a significant improvement for him. Explored how exercise has helped his anxiety levels and mood, to which Miller shared that he feels less stressed or ruminative when he exercises. Discussed his plans for increasing his steps in the future, how he plans to achieve this moving forward. Miller also cites improvement to keeping active with social contacts and maintaining organization with bills and keeping his desk clean, which as he reports, can be a sign he is not doing well if things become disorganized. We explored factors that have made him successful with this, such has having some accountability from therapy and doing smaller steps at a time (e.g. not letting things pile up).     We did transition to talking more about the sadness and difficulty he has with not seeing his daughter anymore. He indicated running into an acquaintance of his daughter at a coffee shop who  recognized him. He shared this experience was hard for him, as he learned his daughter lives close by and is doing well, but he cannot talk to her because of legal restrictions. [facility] helped Miller process and discuss his thoughts and feelings about this matter. We also explored a bit of his family history and relationships with other family of origin members, like his father.     The end of the session focused on helping Miller develop new and better ways to cope with the situation with his daughter and feeling isolated at times, as we agreed this affects his mood at times. Miller identified a goal of wanting to return to [employer] moving forward, as raquel helps him cope with stressful life events. Under an MI framework, we looked at his readiness to go to [employer], including barriers that might hold him back. Also looked at what he could achieve through going to [employer] for spiritual guidance and support in his life.         Progress on Treatment Objective(s) / Homework:  Satisfactory progress - ACTION (Actively working towards change); Intervened by reinforcing change plan / affirming steps taken    Supportive and solution-focused counseling emphasized today    Motivational Interviewing  Target Behavior: disease management/lifestyle changes related to sleep, exercise, and organization    Stage of Change: ACTION (Actively working towards change)    MI Intervention: Co-Developed Goal: improve engagement with sleep hygiene behaviors, physical activity, and organization, Expressed Empathy/Understanding, Supported Autonomy, Collaboration, Evocation, Permission to raise concern or advise, Open-ended questions, Reflections: simple and complex, Rolled with resistance: Emphasized patient autonomy, Simple reflection, Complex reflection, Amplified reflection (weaker or stronger meaning), Shifted topic to defuse resistance, Reframed sustain talk in the direction of change and Evoked patient agenda, Change talk (evoked) and Reframe     Change  Talk Expressed by the Patient: Ability to change Reasons to change Need to change    Provider Response to Change Talk: E - Evoked more info from patient about behavior change, A - Affirmed patient's thoughts, decisions, or attempts at behavior change, R - Reflected patient's change talk and S - Summarized patient's change talk statements      Answers for HPI/ROS submitted by the patient on 4/7/2021   LIZZETTE 7 TOTAL SCORE: 9  If you checked off any problems, how difficult have these problems made it for you to do your work, take care of things at home, or get along with other people?: Very difficult  PHQ9 TOTAL SCORE: 7*    *No SI    Answers for HPI/ROS submitted by the patient on 10/13/2020   If you checked off any problems, how difficult have these problems made it for you to do your work, take care of things at home, or get along with other people?: Somewhat difficult  PHQ9 TOTAL SCORE: 8*  LIZZETTE 7 TOTAL SCORE: 6      *No SI     Answers for HPI/ROS submitted by the patient on 12/29/2020   If you checked off any problems, how difficult have these problems made it for you to do your work, take care of things at home, or get along with other people?: Somewhat difficult  PHQ9 TOTAL SCORE: 5*  LIZZETTE 7 TOTAL SCORE: 8    *No SI     Supportive counseling used    Solution-focused therapy used    Care Plan review completed: not today    Medication Review:  No changes to current psychiatric medication(s)     Medication Compliance:  Yes    Changes in Health Issues:   None reported    Chemical Use Review:   Substance Use: Chemical use reviewed, no active concerns identified      Tobacco Use: No current tobacco use.         Assessment: Current Emotional / Mental Status (status of significant symptoms):  Risk status (Self / Other harm or suicidal ideation)  Patient denies a history of suicidal ideation, suicide attempts, self-injurious behavior, homicidal ideation, homicidal behavior and and other safety concerns     Patient denies  current fears or concerns for personal safety.  Patient denies current or recent suicidal ideation or behaviors.  Patient denies current or recent homicidal ideation or behaviors.  Patient denies current or recent self injurious behavior or ideation.  Patient denies other safety concerns.     A safety and risk management plan has not been developed at this time, however patient was encouraged to call [place] [phone] should there be a change in any of these risk factors.    [place] Suicide Severity Rating Scale (Short Version)  [place] Suicide Severity Rating (Short Version) 1/22/2019 1/24/2019 11/10/2019 12/2/2019 12/10/2019 6/11/2020 7/1/2020   Over the past 2 weeks have you felt down, depressed, or hopeless? - - no yes yes no no   Over the past 2 weeks have you had thoughts of killing yourself? - - no yes no no no   Comments - - - lots of stressors, has thought about not taking meds - - -   Have you ever attempted to kill yourself? - - no no no no no   Q1 Wished to be Dead (Past Month) no no - other (see comments) no - -   Comments - - - why did i do this surgery - - -   Q2 Suicidal Thoughts (Past Month) no no - no no - -   Comments - - - wishes he wouldn't have gone through surgery - - -   Q3 Suicidal Thought Method - - - no no - -   Q4 Suicidal Intent without Specific Plan - - - no no - -   Q5 Suicide Intent with Specific Plan - - - no no - -   Q6 Suicide Behavior (Lifetime) no no - no no - -         Appearance:   Appropriate   Eye Contact:   Good   Psychomotor Behavior: Restless   Attitude:   Cooperative  Interested Friendly Pleasant  Orientation:   All  Speech   Rate / Production: Normal/ Responsive   Volume:  Normal   Mood:    Normal  Affect:    Appropriate    Thought Content:  Clear   Thought Form:  Goal Directed  Logical   Insight:    Good     Diagnoses:  1. Bipolar 1 disorder, depressed, mild (H)    2. Generalized anxiety disorder          Collateral Reports Completed:  Not Applicable    Plan:  (Homework, other):  Continue with this writer for individual psychotherapy in three weeks.He will continue working on his goals with exercise, sleep, and organization. We agreed to continue talking about getting him back to [employer] to help cope better. Avoid mood-altering substances.     Joseph Murillo, RED  5/20/2021      ______________________________________________________________________    [facility] Integrated Behavioral Health Treatment Plan    Client's Name: Frandy Workman  YOB: 1965    Date: 4/7/2021    DSM-V Diagnoses: 296.51 Bipolar I Disorder Current or Most Recent Episode Depressed, Mild;     Clinical Global Impressions  First:  Considering your total clinical experience with this particular patient population, how severe are the patient's symptoms at this time?: 4 (12/18/2020  2:22 PM)      Most recent:  Compared to the patient's condition at the START of treatment, this patient's condition is: 2 (12/18/2020  2:22 PM)        Referral / Collaboration:  Will collaborate with care team as indicated during treatment.    Anticipated number of session or this episode of care: 10+      MeasurableTreatment Goal(s) related to diagnosis / functional impairment(s)  Goal 1:  Patient will experience a reduction in depressive and anxious symptoms, along with a corresponding increase in positive emotion and life satisfaction.    Objective #A: Patient will experience a reduction in depressed mood, will develop more effective coping skills to manage depressive symptoms, will develop healthy cognitive patterns and beliefs, will increase ability to function adaptively and will continue to take medications as prescribed / participate in supportive activities and services    Status: Continued - Date(s): 4/7/2021    Objective #B: Patient will experience a reduction in anxiety, will develop more effective coping skills to manage anxiety symptoms, will develop healthy cognitive patterns and beliefs and will increase  ability to function adaptively  Status: Continued - Date(s):4/7/2021    Objective #C: Patient will develop better understanding of triggers and coping strategies to stabilize mood  Status: Continued - Date(s): 4/7/2021    Goal 2:  Patient will identify and increase engagement in valued activity, i.e. improving social connections/relationships, pursuing occupational goals or personally meaningful pursuits, exploration of meaning in life.     Objective #A: Patient will identify meaningful activity in social, occupational and  personal goals, and increase behavioral activation around these goals   Status: Continued - Date(s): 4/7/2021    Objective #B: Patient will address relationship difficulties in a more adaptive manner  Status: Continued - Date(s): 4/7/2021    Objective #C: Patient will develop coping/problem-solving skills to facilitate more adaptive adjustment and will effectively address problems that interfere with adaptive functioning  Status: Continued - Date(s): 4/7/2021        Possible Therapeutic Intervention(s)  Psycho-education regarding mental health diagnoses and treatment options    Skills training    Explore skills useful to client in current situation.    Skills include assertiveness, communication, conflict management, problem-solving, relaxation, etc.    Solution-Focused Therapy    Explore patterns in patient's relationships and discuss options for new behaviors.    Explore patterns in patient's actions and choices and discuss options for new behaviors.    Cognitive-behavioral Therapy    Discuss common cognitive distortions, identify them in patient's life.    Explore ways to challenge, replace, and act against these cognitions.    Acceptance and Commitment Therapy    Explore and identify important values in patient's life.    Discuss ways to commit to behavioral activation around these values.    Psychodynamic psychotherapy    Discuss patient's emotional dynamics and issues and how they impact  behaviors.    Explore patient's history of relationships and how they impact present behaviors.    Explore how to work with and make changes in these schemas and patterns.    Narrative Therapy    Explore the patient's story of his/her life from his/her perspective.    Explore alternate ways of understanding their experience, identifying exceptions, developing new themes.    Interpersonal Psychotherapy    Explore patterns in relationships that are effective or ineffective at helping patient reach their goals, find satisfying experience.    Discuss new patterns or behaviors to engage in for improved social functioning.    Behavioral Activation    Discuss steps patient can take to become more involved in meaningful activity.    Identify barriers to these activities and explore possible solutions.    Mindfulness-Based Strategies    Discuss skills based on development and application of mindfulness.    Skills drawn from compassion-focused therapy, dialectical behavior therapy, mindfulness-based stress reduction, mindfulness-based cognitive therapy, etc.      We have developed these goals together during our work to this point. Patient has assisted in the development of these goals and has agreed to this treatment plan.       Joseph Murillo LP  4/7/2021

## 2021-06-04 DIAGNOSIS — Z94.4 LIVER REPLACED BY TRANSPLANT (H): ICD-10-CM

## 2021-06-04 DIAGNOSIS — Z94.4 STATUS POST LIVER TRANSPLANTATION (H): ICD-10-CM

## 2021-06-04 DIAGNOSIS — R97.20 ELEVATED PROSTATE SPECIFIC ANTIGEN (PSA): ICD-10-CM

## 2021-06-04 LAB
ALBUMIN SERPL-MCNC: 4.2 G/DL (ref 3.4–5)
ALP SERPL-CCNC: 108 U/L (ref 40–150)
ALT SERPL W P-5'-P-CCNC: 26 U/L (ref 0–70)
ANION GAP SERPL CALCULATED.3IONS-SCNC: 6 MMOL/L (ref 3–14)
AST SERPL W P-5'-P-CCNC: 10 U/L (ref 0–45)
BILIRUB DIRECT SERPL-MCNC: 0.2 MG/DL (ref 0–0.2)
BILIRUB SERPL-MCNC: 0.5 MG/DL (ref 0.2–1.3)
BUN SERPL-MCNC: 39 MG/DL (ref 7–30)
CALCIUM SERPL-MCNC: 10.2 MG/DL (ref 8.5–10.1)
CHLORIDE SERPL-SCNC: 106 MMOL/L (ref 94–109)
CO2 SERPL-SCNC: 26 MMOL/L (ref 20–32)
CREAT SERPL-MCNC: 1.64 MG/DL (ref 0.66–1.25)
ERYTHROCYTE [DISTWIDTH] IN BLOOD BY AUTOMATED COUNT: 14.1 % (ref 10–15)
GFR SERPL CREATININE-BSD FRML MDRD: 46 ML/MIN/{1.73_M2}
GLUCOSE SERPL-MCNC: 156 MG/DL (ref 70–99)
HCT VFR BLD AUTO: 26.9 % (ref 40–53)
HGB BLD-MCNC: 9 G/DL (ref 13.3–17.7)
MAGNESIUM SERPL-MCNC: 2.2 MG/DL (ref 1.6–2.3)
MCH RBC QN AUTO: 31.6 PG (ref 26.5–33)
MCHC RBC AUTO-ENTMCNC: 33.5 G/DL (ref 31.5–36.5)
MCV RBC AUTO: 94 FL (ref 78–100)
PLATELET # BLD AUTO: 149 10E9/L (ref 150–450)
POTASSIUM SERPL-SCNC: 4.6 MMOL/L (ref 3.4–5.3)
PROT SERPL-MCNC: 7.8 G/DL (ref 6.8–8.8)
PSA SERPL-MCNC: 1.57 UG/L (ref 0–4)
RBC # BLD AUTO: 2.85 10E12/L (ref 4.4–5.9)
SODIUM SERPL-SCNC: 138 MMOL/L (ref 133–144)
WBC # BLD AUTO: 5.1 10E9/L (ref 4–11)

## 2021-06-04 PROCEDURE — 80076 HEPATIC FUNCTION PANEL: CPT | Performed by: PATHOLOGY

## 2021-06-04 PROCEDURE — 83735 ASSAY OF MAGNESIUM: CPT | Performed by: PATHOLOGY

## 2021-06-04 PROCEDURE — 80048 BASIC METABOLIC PNL TOTAL CA: CPT | Performed by: PATHOLOGY

## 2021-06-04 PROCEDURE — 85027 COMPLETE CBC AUTOMATED: CPT | Performed by: PATHOLOGY

## 2021-06-04 PROCEDURE — 84153 ASSAY OF PSA TOTAL: CPT | Performed by: PATHOLOGY

## 2021-06-04 PROCEDURE — 36415 COLL VENOUS BLD VENIPUNCTURE: CPT | Performed by: PATHOLOGY

## 2021-06-04 PROCEDURE — 82728 ASSAY OF FERRITIN: CPT | Performed by: PATHOLOGY

## 2021-06-04 PROCEDURE — 83540 ASSAY OF IRON: CPT | Performed by: PATHOLOGY

## 2021-06-04 PROCEDURE — 83550 IRON BINDING TEST: CPT | Performed by: PATHOLOGY

## 2021-06-07 ENCOUNTER — VIRTUAL VISIT (OUTPATIENT)
Dept: BEHAVIORAL HEALTH | Facility: CLINIC | Age: 56
End: 2021-06-07
Payer: MEDICARE

## 2021-06-07 ENCOUNTER — VIRTUAL VISIT (OUTPATIENT)
Dept: NEPHROLOGY | Facility: CLINIC | Age: 56
End: 2021-06-07
Attending: INTERNAL MEDICINE
Payer: MEDICARE

## 2021-06-07 DIAGNOSIS — N18.31 ANEMIA IN STAGE 3A CHRONIC KIDNEY DISEASE (H): Primary | ICD-10-CM

## 2021-06-07 DIAGNOSIS — D63.1 ANEMIA IN STAGE 3A CHRONIC KIDNEY DISEASE (H): Primary | ICD-10-CM

## 2021-06-07 DIAGNOSIS — N18.32 STAGE 3B CHRONIC KIDNEY DISEASE (H): ICD-10-CM

## 2021-06-07 DIAGNOSIS — N18.32 CHRONIC KIDNEY DISEASE, STAGE 3B (H): ICD-10-CM

## 2021-06-07 DIAGNOSIS — F31.31 BIPOLAR 1 DISORDER, DEPRESSED, MILD (H): Primary | ICD-10-CM

## 2021-06-07 LAB
FERRITIN SERPL-MCNC: 399 NG/ML (ref 26–388)
IRON SATN MFR SERPL: 59 % (ref 15–46)
IRON SERPL-MCNC: 158 UG/DL (ref 35–180)
TIBC SERPL-MCNC: 270 UG/DL (ref 240–430)

## 2021-06-07 PROCEDURE — 90832 PSYTX W PT 30 MINUTES: CPT | Mod: 95 | Performed by: PSYCHOLOGIST

## 2021-06-07 PROCEDURE — 82728 ASSAY OF FERRITIN: CPT | Performed by: INTERNAL MEDICINE

## 2021-06-07 PROCEDURE — 99443 PR PHYSICIAN TELEPHONE EVALUATION 21-30 MIN: CPT | Mod: 95 | Performed by: INTERNAL MEDICINE

## 2021-06-07 NOTE — PROGRESS NOTES
Nephrology Clinic - Telephone Visit      Eloy Rao MD  2021     Name: Frandy Workman  MRN: [id number]  Age: 56 year old  : 1965  Referring provider: Natalie Russell     Assessment and Plan:  1. CKD, stage 3b (A3): Prior to recent liver transplant baseline Cr ~1.1 mg/dL with eGFR of 75 ml/min. Post-transplant creatinine is  ~1.6 mg/dL (eGFR of 40 ml/min) and remains stable.  I suspect he likely has some degree of diabetic changes further complicated by chronic changes from past lithium use (for 15 years) and now primarily driven by tacrolimus toxicity.  He is at risk of progressive CKD due to tacrolimus and diabetes.      -would favor lower tacrolimus levels as able and potentially changing to another immunosuppressive in the near future  -we discussed starting RAAS blockade instead of carvedilol, especially given he now has low grade albuminuria (~322 mg/g).  However, given that his potassium was high normal in past, I am concerned about some underlying type 4 RTA due to diabetes and/or tacrolimus such that starting RAAS blockade may provide more risk than benefit.  At this point, given recent initiation of empagliflozin which may help with his albuminuria we will just monitor for now and reconsider starting him on an ACEi/ARB at his next visit should he develop worsening albuminuria.     -RTC in 3 months     2. HTN/Volume status: Recently hypertensive though now with better blood pressure control after his PCP started him on low dose carvedilol.  Euvolemic by report and no longer on lasix.   -as mentioned would consider starting him on RAAS blockade in place of low dose carvedilol at next visit (see problem 1)     3.  Electrolytes:  Potassium on higher side in past.  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      4. Anemia: Possibly related to Imuran in the past, which has been discontinued.  Iron  stores are replete.  Anemia of chronic disease and renal insufficiency also likely contributing.  He did IVELISSE treatment and hemoglobin improved to 13.9 and had no further need for IVELISSE since August, 2020.  However, hemoglobin is now 9 with no evidence of acute blood loss.  Iron stores are adequate.       -will refer back to anemia management clinic with hope of restarting IVELISSE therapy      Follow-up: RTC in 3 months     Reason For Visit:   CKD    HPI:   Frandy Workman is a 56 year old man who presents for CKD f/u.  His past medical history is remarkable for liver transplant (November, 2019) for ESTRADA cirrhosis (complicated by ascites and hepatic encephalopathy on lactulose and rifaximin in the past), hepatocellular carcinoma (s/p TACE and microwave ablation 01/2019), long standing DM 2 (complicated by nonproliferative diabetic retinopathy, macular edema and peripheral neuropathy), bipolar disorder (previously on lithium for 15 years), HTN, hyperlipidemia and CAD by angiography not requiring PCI.      He denies excessive use of NSAIDs in the past.  He had no history of nephrolithiasis or frequent UTIs.  He has no significant family history of CKD.     In terms of CKD, he appeared to have a baseline of ~1.1 mg/dL prior to his liver transplant in November 2019.  Creatinine after transplant was ~1.3 mg/dL at time of discharge and vikram to 3.2 mg/dL thought to be prerenal from volume depletion.  He was admitted for IVF and creatinine improved to 1.5 mg/dL at time of discharge.  His serum Cr now seems to fluctuate around 1.8 mg/dL with an eGFR of 40 ml/min.  He continues on tacrolimus for his liver transplant.  He no longer is on lasix for management of edema.  He has not required IVELISSE therapy in several months (last August, 2020).  He recently was started on low dose carvedilol for management of hypertension.  He also recently started empagliflozin for management of diabetes. He reports weight gain due to lack of exercise  during the COVID pandemic.  His main complaint today is that of pain near his incisional scars for his liver transplant for which he is following up with hepatology.  His BP this morning was 129/84.      Interval History:  Overall, Mr. Workman feels well with the exception of fatigue like he had in the past when he was anemic.  His hemoglobin is below goal.  He denies evidence of acute/GI blood loss.      Review of Systems:   Pertinent items are noted in HPI or as below, remainder of complete ROS is negative.      Active Medications:   Current Outpatient Medications   Medication     Acetaminophen (TYLENOL) 325 MG CAPS     ARIPiprazole (ABILIFY) 5 MG tablet     Artificial Tear Solution (SM ARTIFICIAL TEARS) SOLN     aspirin 81 MG EC tablet     BD VIKTORIA U/F 32G X 4 MM insulin pen needle     carvedilol (COREG) 3.125 MG tablet     ciclopirox (LOPROX) 0.77 % cream     Continuous Blood Gluc  (FREESTYLE CLAUDIA 14 DAY READER) CECILIO     Continuous Blood Gluc Sensor (FREESTYLE CLAUDIA 14 DAY SENSOR) MISC     empagliflozin (JARDIANCE) 25 MG TABS tablet     glucose (BD GLUCOSE) 4 g chewable tablet     insulin aspart (NOVOLOG PEN) 100 UNIT/ML pen     insulin degludec (TRESIBA FLEXTOUCH) 100 UNIT/ML pen     lamiVUDine (EPIVIR) 100 MG tablet     Multiple Vitamin (THERAVITE PO)     mycophenolate (GENERIC EQUIVALENT) 250 MG capsule     order for DME     pantoprazole (PROTONIX) 40 MG EC tablet     polyethylene glycol (MIRALAX) 17 g packet     predniSONE (DELTASONE) 5 MG tablet     rosuvastatin (CRESTOR) 5 MG tablet     tamsulosin (FLOMAX) 0.4 MG capsule     vitamin B-12 (CYANOCOBALAMIN) 1000 MCG tablet     vitamin D3 (CHOLECALCIFEROL) 50 mcg (2000 units) tablet     zolpidem (AMBIEN) 10 MG tablet     gabapentin (NEURONTIN) 300 MG capsule     Current Facility-Administered Medications   Medication     aflibercept (EYLEA) injection prefilled syringe 2 mg     aflibercept (EYLEA) injection prefilled syringe 2 mg        Allergies:    Codeine and Seasonal allergies      Past Medical History:  Chronic kidney disease, stage 3  Type 2 diabetes mellitus  Bipolar affective disorder   Brow ptosis  Hepatocellular carcinoma  Coronary artery disease   Hypertension   Anemia   Anxiety   Mild recurrent major depression  Mild nonproliferative retinopathy  Gastroesophageal reflux disease   Cholelithiasis   Benign prostatic hypertrophy   Portal vein thrombosis      Past Surgical History:  Liver transplant recipient   Coronary catheterization  Parotidectomy, radical neck dissection  Right eyelid weight placement  Orthopedic surgery    Family History:   Prostate cancer - maternal grandfather, father   Substance abuse - maternal grandfather, maternal grandmother   Colon cancer - father, paternal grandmother  Cancer - mother  Thyroid disease - mother, sister   Stroke - mother  Depression - mother, sister  Asthma - sister      Social History:   Presents to clinic alone  Tobacco Use: Former smoker, 180 pack year history, quit 2017.   Alcohol Use: quit September 1996  PCP: Nerissa Moe      Physical Exam:  There were no vitals taken for this visit.   Physical exam deferred as encounter was a telephone visit.      Laboratory:  CMP  Recent Labs   Lab Test 06/04/21  1236 05/05/21  0909 04/20/21  1149 03/26/21  0839 03/26/21  0826 02/26/21  0743 09/25/20  0859 09/25/20  0859 09/16/20  0803 08/19/20  0730    139 136  --  137 140   < > 137 139 139   POTASSIUM 4.6 4.5 4.6  --  4.8 5.0   < > 4.9 4.9 5.4*   CHLORIDE 106 107 104  --  106 108   < > 109 108 108   CO2 26 26 27  --  27 29   < > 22 27 28   ANIONGAP 6 6 6  --  4 3   < > 7 4 3   * 258* 254*  --  179* 105*   < > 276* 252* 214*   BUN 39* 38* 36*  --  31* 44*   < > 39* 39* 34*   CR 1.64* 1.52* 1.55*  --  1.54* 1.82*   < > 1.48* 1.60* 1.58*   GFRESTIMATED 46* 50* 49* 45* 49* 40*   < > 52* 47* 48*   GFRESTBLACK 53* 58* 57* 54* 57* 47*   < > 60* 55* 56*   DEBORAH 10.2* 9.6 9.3  --  9.8 9.9   < > 9.0 9.1 8.8    MAG 2.2  --  2.2  --  2.2 2.2   < > 1.6 1.8 1.8   PHOS  --   --   --   --   --  4.0  --  3.4 3.9 3.5   PROTTOTAL 7.8 7.8 7.6  --  7.8 8.2   < > 7.5 8.0 7.0   ALBUMIN 4.2 4.2 3.8  --  4.0 4.4   < > 3.7 3.9 3.9   BILITOTAL 0.5 0.5 0.7  --  0.7 0.7   < > 0.6 0.5 0.4   ALKPHOS 108 112 126  --  138 122   < > 129 132 110   AST 10 13 10  --  16 15   < > 14 10 18   ALT 26 28 29  --  33 32   < > 34 27 33    < > = values in this interval not displayed.     CBC  Recent Labs   Lab Test 06/04/21  1236 05/05/21  0909 04/20/21  1149 03/26/21  0826   HGB 9.0* 11.6* 12.1* 13.0*   WBC 5.1 5.6 7.6 5.2   RBC 2.85* 3.64* 3.70* 4.03*   HCT 26.9* 35.4* 35.8* 39.2*   MCV 94 97 97 97   MCH 31.6 31.9 32.7 32.3   MCHC 33.5 32.8 33.8 33.2   RDW 14.1 13.3 13.2 13.8   * 146* 132* 125*     INR  Recent Labs   Lab Test 01/24/20  0837 12/05/19  0824 11/16/19  0648 11/15/19  0449 11/12/19  1400 11/12/19  1400 11/12/19  0346 11/12/19  0346 11/11/19  1651 11/11/19  1651 11/11/19  1600   INR 1.09 1.14 1.11 1.12   < > 1.46*   < > 1.47*   < >  --  1.60*   PTT  --   --   --   --   --  39*  --  57*  --  48* 48*    < > = values in this interval not displayed.     ABG  Recent Labs   Lab Test 11/12/19  1513 11/12/19  1400 11/11/19  1735 11/11/19  1651 11/11/19  1600   PH 7.37 7.36  --  7.41 7.48*   PCO2 42 43  --  42 36   PO2 114* 92  --  81 98   HCO3 24 24  --  27 26   O2PER 44% 41% 30 32 32.0      URINE STUDIES  Recent Labs   Lab Test 12/04/19  1400 11/15/19  1123 07/08/19  1017 02/04/19  0705   COLOR Light Yellow Light Yellow Yellow Yellow   APPEARANCE Clear Clear Clear Clear   URINEGLC 30* 300* >499* 150*   URINEBILI Negative Negative Negative Negative   URINEKETONE Negative Negative Negative Negative   SG 1.009 1.007 1.017 1.016   UBLD Negative Small* Negative Negative   URINEPH 5.0 6.5 5.0 5.0   PROTEIN 30* Negative Negative Negative   NITRITE Negative Negative Negative Negative   LEUKEST Negative Negative Negative Negative   RBCU 0 0 <1 1    WBCU 1 <1 3 1     Recent Labs   Lab Test 02/26/21  0750 06/29/20  1008 03/02/20  1013 12/02/19  1040 07/08/19  1017 02/04/19  0705   UTPG 0.47* 0.89* 0.29* 1.22* 0.11 0.14     PTH  Recent Labs   Lab Test 06/29/20  1005 07/08/19  1020   PTHI 61 54     IRON STUDIES   Recent Labs   Lab Test 12/02/20  0739 11/11/20  0754 10/14/20  0836 09/16/20  0802 07/29/20  0749 06/29/20  1005 05/21/20  0723 04/22/20  0833 03/09/20  0823 01/27/20  1340 12/02/19  1034 12/28/18  1108 09/15/18  1215 06/09/17  1250   IRON 75 81 73 93 87 60 72 65 92  --  88  --  52  --    * 227* 248 221* 230* 204* 192* 215* 231*  --  248  --  403  --    IRONSAT 33 36 29 42 38 29 38 30 40  --  36  --  13*  --    QUAN 433* 286 323 223 193 352 344 643* 859* 690* 1,353* 90 10* 450*         Eloy Rao MD

## 2021-06-07 NOTE — LETTER
2021       RE: Frandy Workman  [address] [address]     Dear Colleague,    Thank you for referring your patient, Frandy Workman, to the [facility] [place] at [facility]. Please see a copy of my visit note below.    Milelr is a 56 year old who is being evaluated via a billable telephone visit.      What phone number would you like to be contacted at? [phone]    How would you like to obtain your AVS? MyChart     Phone call duration: 30 minutes      Oriana MATTHEWS CMA      Nephrology Clinic - Telephone Visit      Eloy Rao MD  2021     Name: Frandy Workman  MRN: [id number]  Age: 56 year old  : 1965  Referring provider: Natalie Russell     Assessment and Plan:  1. CKD, stage 3b (A3): Prior to recent liver transplant baseline Cr ~1.1 mg/dL with eGFR of 75 ml/min. Post-transplant creatinine is  ~1.6 mg/dL (eGFR of 40 ml/min) and remains stable.  I suspect he likely has some degree of diabetic changes further complicated by chronic changes from past lithium use (for 15 years) and now primarily driven by tacrolimus toxicity.  He is at risk of progressive CKD due to tacrolimus and diabetes.      -would favor lower tacrolimus levels as able and potentially changing to another immunosuppressive in the near future  -we discussed starting RAAS blockade instead of carvedilol, especially given he now has low grade albuminuria (~322 mg/g).  However, given that his potassium was high normal in past, I am concerned about some underlying type 4 RTA due to diabetes and/or tacrolimus such that starting RAAS blockade may provide more risk than benefit.  At this point, given recent initiation of empagliflozin which may help with his albuminuria we will just monitor for now and reconsider starting him on an ACEi/ARB at his next visit should he develop worsening albuminuria.     -RTC in 3  months     2. HTN/Volume status: Recently hypertensive though now with better blood pressure control after his PCP started him on low dose carvedilol.  Euvolemic by report and no longer on lasix.   -as mentioned would consider starting him on RAAS blockade in place of low dose carvedilol at next visit (see problem 1)     3.  Electrolytes:  Potassium on higher side in past.  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      4. Anemia: Possibly related to Imuran in the past, which has been discontinued.  Iron stores are replete.  Anemia of chronic disease and renal insufficiency also likely contributing.  He did IVELISSE treatment and hemoglobin improved to 13.9 and had no further need for IVELISSE since August, 2020.  However, hemoglobin is now 9 with no evidence of acute blood loss.  Iron stores are adequate.       -will refer back to anemia management clinic with hope of restarting IVELISSE therapy      Follow-up: RTC in 3 months     Reason For Visit:   CKD    HPI:   Frandy Workman is a 56 year old man who presents for CKD f/u.  His past medical history is remarkable for liver transplant (November, 2019) for ESTRADA cirrhosis (complicated by ascites and hepatic encephalopathy on lactulose and rifaximin in the past), hepatocellular carcinoma (s/p TACE and microwave ablation 01/2019), long standing DM 2 (complicated by nonproliferative diabetic retinopathy, macular edema and peripheral neuropathy), bipolar disorder (previously on lithium for 15 years), HTN, hyperlipidemia and CAD by angiography not requiring PCI.      He denies excessive use of NSAIDs in the past.  He had no history of nephrolithiasis or frequent UTIs.  He has no significant family history of CKD.     In terms of CKD, he appeared to have a baseline of ~1.1 mg/dL prior to his liver transplant in November 2019.  Creatinine after transplant was ~1.3 mg/dL at time of discharge and vikram to 3.2 mg/dL  thought to be prerenal from volume depletion.  He was admitted for IVF and creatinine improved to 1.5 mg/dL at time of discharge.  His serum Cr now seems to fluctuate around 1.8 mg/dL with an eGFR of 40 ml/min.  He continues on tacrolimus for his liver transplant.  He no longer is on lasix for management of edema.  He has not required IVELISSE therapy in several months (last August, 2020).  He recently was started on low dose carvedilol for management of hypertension.  He also recently started empagliflozin for management of diabetes. He reports weight gain due to lack of exercise during the COVID pandemic.  His main complaint today is that of pain near his incisional scars for his liver transplant for which he is following up with hepatology.  His BP this morning was 129/84.      Interval History:  Overall, Mr. Workman feels well with the exception of fatigue like he had in the past when he was anemic.  His hemoglobin is below goal.  He denies evidence of acute/GI blood loss.      Review of Systems:   Pertinent items are noted in HPI or as below, remainder of complete ROS is negative.      Active Medications:   Current Outpatient Medications   Medication     Acetaminophen (TYLENOL) 325 MG CAPS     ARIPiprazole (ABILIFY) 5 MG tablet     Artificial Tear Solution (SM ARTIFICIAL TEARS) SOLN     aspirin 81 MG EC tablet     BD VIKTORIA U/F 32G X 4 MM insulin pen needle     carvedilol (COREG) 3.125 MG tablet     ciclopirox (LOPROX) 0.77 % cream     Continuous Blood Gluc  (FREESTYLE CLAUDIA 14 DAY READER) CECILIO     Continuous Blood Gluc Sensor (FREESTYLE CLAUDIA 14 DAY SENSOR) MISC     empagliflozin (JARDIANCE) 25 MG TABS tablet     glucose (BD GLUCOSE) 4 g chewable tablet     insulin aspart (NOVOLOG PEN) 100 UNIT/ML pen     insulin degludec (TRESIBA FLEXTOUCH) 100 UNIT/ML pen     lamiVUDine (EPIVIR) 100 MG tablet     Multiple Vitamin (THERAVITE PO)     mycophenolate (GENERIC EQUIVALENT) 250 MG capsule     order for DME      pantoprazole (PROTONIX) 40 MG EC tablet     polyethylene glycol (MIRALAX) 17 g packet     predniSONE (DELTASONE) 5 MG tablet     rosuvastatin (CRESTOR) 5 MG tablet     tamsulosin (FLOMAX) 0.4 MG capsule     vitamin B-12 (CYANOCOBALAMIN) 1000 MCG tablet     vitamin D3 (CHOLECALCIFEROL) 50 mcg (2000 units) tablet     zolpidem (AMBIEN) 10 MG tablet     gabapentin (NEURONTIN) 300 MG capsule     Current Facility-Administered Medications   Medication     aflibercept (EYLEA) injection prefilled syringe 2 mg     aflibercept (EYLEA) injection prefilled syringe 2 mg        Allergies:   Codeine and Seasonal allergies      Past Medical History:  Chronic kidney disease, stage 3  Type 2 diabetes mellitus  Bipolar affective disorder   Brow ptosis  Hepatocellular carcinoma  Coronary artery disease   Hypertension   Anemia   Anxiety   Mild recurrent major depression  Mild nonproliferative retinopathy  Gastroesophageal reflux disease   Cholelithiasis   Benign prostatic hypertrophy   Portal vein thrombosis      Past Surgical History:  Liver transplant recipient   Coronary catheterization  Parotidectomy, radical neck dissection  Right eyelid weight placement  Orthopedic surgery    Family History:   Prostate cancer - maternal grandfather, father   Substance abuse - maternal grandfather, maternal grandmother   Colon cancer - father, paternal grandmother  Cancer - mother  Thyroid disease - mother, sister   Stroke - mother  Depression - mother, sister  Asthma - sister      Social History:   Presents to clinic alone  Tobacco Use: Former smoker, 180 pack year history, quit 2017.   Alcohol Use: quit September 1996  PCP: Nerissa Moe      Physical Exam:  There were no vitals taken for this visit.   Physical exam deferred as encounter was a telephone visit.      Laboratory:  CMP  Recent Labs   Lab Test 06/04/21  1236 05/05/21  0909 04/20/21  1149 03/26/21  0839 03/26/21  0826 02/26/21  0743 09/25/20  0859 09/25/20  0859 09/16/20  0803  08/19/20  0730    139 136  --  137 140   < > 137 139 139   POTASSIUM 4.6 4.5 4.6  --  4.8 5.0   < > 4.9 4.9 5.4*   CHLORIDE 106 107 104  --  106 108   < > 109 108 108   CO2 26 26 27  --  27 29   < > 22 27 28   ANIONGAP 6 6 6  --  4 3   < > 7 4 3   * 258* 254*  --  179* 105*   < > 276* 252* 214*   BUN 39* 38* 36*  --  31* 44*   < > 39* 39* 34*   CR 1.64* 1.52* 1.55*  --  1.54* 1.82*   < > 1.48* 1.60* 1.58*   GFRESTIMATED 46* 50* 49* 45* 49* 40*   < > 52* 47* 48*   GFRESTBLACK 53* 58* 57* 54* 57* 47*   < > 60* 55* 56*   DEBORAH 10.2* 9.6 9.3  --  9.8 9.9   < > 9.0 9.1 8.8   MAG 2.2  --  2.2  --  2.2 2.2   < > 1.6 1.8 1.8   PHOS  --   --   --   --   --  4.0  --  3.4 3.9 3.5   PROTTOTAL 7.8 7.8 7.6  --  7.8 8.2   < > 7.5 8.0 7.0   ALBUMIN 4.2 4.2 3.8  --  4.0 4.4   < > 3.7 3.9 3.9   BILITOTAL 0.5 0.5 0.7  --  0.7 0.7   < > 0.6 0.5 0.4   ALKPHOS 108 112 126  --  138 122   < > 129 132 110   AST 10 13 10  --  16 15   < > 14 10 18   ALT 26 28 29  --  33 32   < > 34 27 33    < > = values in this interval not displayed.     CBC  Recent Labs   Lab Test 06/04/21  1236 05/05/21  0909 04/20/21  1149 03/26/21  0826   HGB 9.0* 11.6* 12.1* 13.0*   WBC 5.1 5.6 7.6 5.2   RBC 2.85* 3.64* 3.70* 4.03*   HCT 26.9* 35.4* 35.8* 39.2*   MCV 94 97 97 97   MCH 31.6 31.9 32.7 32.3   MCHC 33.5 32.8 33.8 33.2   RDW 14.1 13.3 13.2 13.8   * 146* 132* 125*     INR  Recent Labs   Lab Test 01/24/20  0837 12/05/19  0824 11/16/19  0648 11/15/19  0449 11/12/19  1400 11/12/19  1400 11/12/19  0346 11/12/19  0346 11/11/19  1651 11/11/19  1651 11/11/19  1600   INR 1.09 1.14 1.11 1.12   < > 1.46*   < > 1.47*   < >  --  1.60*   PTT  --   --   --   --   --  39*  --  57*  --  48* 48*    < > = values in this interval not displayed.     ABG  Recent Labs   Lab Test 11/12/19  1513 11/12/19  1400 11/11/19  1735 11/11/19  1651 11/11/19  1600   PH 7.37 7.36  --  7.41 7.48*   PCO2 42 43  --  42 36   PO2 114* 92  --  81 98   HCO3 24 24  --  27 26   O2PER  44% 41% 30 32 32.0      URINE STUDIES  Recent Labs   Lab Test 12/04/19  1400 11/15/19  1123 07/08/19  1017 02/04/19  0705   COLOR Light Yellow Light Yellow Yellow Yellow   APPEARANCE Clear Clear Clear Clear   URINEGLC 30* 300* >499* 150*   URINEBILI Negative Negative Negative Negative   URINEKETONE Negative Negative Negative Negative   SG 1.009 1.007 1.017 1.016   UBLD Negative Small* Negative Negative   URINEPH 5.0 6.5 5.0 5.0   PROTEIN 30* Negative Negative Negative   NITRITE Negative Negative Negative Negative   LEUKEST Negative Negative Negative Negative   RBCU 0 0 <1 1   WBCU 1 <1 3 1     Recent Labs   Lab Test 02/26/21  0750 06/29/20  1008 03/02/20  1013 12/02/19  1040 07/08/19  1017 02/04/19  0705   UTPG 0.47* 0.89* 0.29* 1.22* 0.11 0.14     PTH  Recent Labs   Lab Test 06/29/20  1005 07/08/19  1020   PTHI 61 54     IRON STUDIES   Recent Labs   Lab Test 12/02/20  0739 11/11/20  0754 10/14/20  0836 09/16/20  0802 07/29/20  0749 06/29/20  1005 05/21/20  0723 04/22/20  0833 03/09/20  0823 01/27/20  1340 12/02/19  1034 12/28/18  1108 09/15/18  1215 06/09/17  1250   IRON 75 81 73 93 87 60 72 65 92  --  88  --  52  --    * 227* 248 221* 230* 204* 192* 215* 231*  --  248  --  403  --    IRONSAT 33 36 29 42 38 29 38 30 40  --  36  --  13*  --    QUAN 433* 286 323 223 193 352 344 643* 859* 690* 1,353* 90 10* 450*         Eloy Rao MD       Again, thank you for allowing me to participate in the care of your patient.      Sincerely,    Eloy Rao MD

## 2021-06-07 NOTE — PROGRESS NOTES
[facility]: Integrated Behavioral Health  June 7, 2021      Behavioral Health Clinician Progress Note    Patient Name: Frandy Workman           Service Type: Video visit      Service Location:  Video visit      Session Start Time: 3:04  Session End Time: 3:36       Session Length: 16 - 37      Attendees: Patient    Visit Activities (Refresh list every visit): [facility] Only     Telemedicine Visit: The patient's condition can be safely assessed and treated via synchronous audio and visual telemedicine encounter.      Reason for Telemedicine Visit: COVID-19    Originating Site (Patient Location): Patient's home    Distant Site (Provider Location): Provider Remote Setting    Consent:  The patient/guardian has verbally consented to: the potential risks and benefits of telemedicine (video visit) versus in person care; bill my insurance or make self-payment for services provided; and responsibility for payment of non-covered services.     Mode of Communication:  Video Conference via [employer]    As the provider I attest to compliance with applicable laws and regulations related to telemedicine.    Diagnostic Assessment Date: 12/03/2020  Treatment Plan Review Date:  7/7/2021  See Flowsheets for today's PHQ-9 and LIZZETTE-7 results  Previous PHQ-9:   PHQ-9 SCORE 10/13/2020 12/29/2020 4/7/2021   PHQ-9 Total Score MyChart 8 (Mild depression) 5 (Mild depression) 7 (Mild depression)   PHQ-9 Total Score 8 5 7     Previous LIZZETTE-7:   LIZZETTE-7 SCORE 10/13/2020 12/29/2020 4/7/2021   Total Score 6 (mild anxiety) 8 (mild anxiety) 9 (mild anxiety)   Total Score 6 8 9     Relationships   Social connections     Talks on phone: Once a week     Gets together: Once a week     Attends An[employer]ist service: Never     Active member of club or organization: No     Attends meetings of clubs or organizations: Never     Relationship status:       HEATH LEVEL:  No flowsheet data found.    DATA  Extended Session (60+  minutes): No  Interactive Complexity: No  Crisis: No    Treatment Objective(s) Addressed in This Session:  Target Behavior(s): disease management/lifestyle changes related to depressive and anxiety symptoms    Depression and anxiety management    Current Stressors / Issues:  Miller presented today for a [facility] follow-up. Our session consisted of checking-in about his emotional health and goals he is working on. Miller says he is keeping up with maintaining his schedule and his to-do list each day, which he says helps him keep organized. He described that his hemoglobin levels have declined lately, which has made him feel more tired and lethargic, which has been a barrier for him with exercise and getting out more. Miller said that because he is feeling more tired, he has noticed thinking about his daughter more often, which he described as an empty and sad feeling. He described how he researched her online and found out that she is doing well in school and is going to the [employer], which makes him proud but also sad that he cannot be part of her life. We continued to process his sadness with not seeing her and we did talk about the consequences he knows about if he acts on his thoughts of getting in contact with her, which Miller is well aware of. [facility] continued to provide support and guidance for Miller as he works toward his goals with exercise, going to [employer], and keeping organized.       Progress on Treatment Objective(s) / Homework:  Satisfactory progress - ACTION (Actively working towards change); Intervened by reinforcing change plan / affirming steps taken    Supportive and solution-focused counseling emphasized today    Motivational Interviewing  Target Behavior: disease management/lifestyle changes related to anxiety,  sleep, exercise, and organization    Stage of Change: ACTION (Actively working towards change)    MI Intervention: Co-Developed Goal: improve engagement with sleep hygiene behaviors, physical activity, and  organization, Expressed Empathy/Understanding, Supported Autonomy, Collaboration, Evocation, Permission to raise concern or advise, Open-ended questions, Reflections: simple and complex, Rolled with resistance: Emphasized patient autonomy, Simple reflection, Complex reflection, Amplified reflection (weaker or stronger meaning), Shifted topic to defuse resistance, Reframed sustain talk in the direction of change and Evoked patient agenda, Change talk (evoked) and Reframe     Change Talk Expressed by the Patient: Ability to change Reasons to change Need to change    Provider Response to Change Talk: E - Evoked more info from patient about behavior change, A - Affirmed patient's thoughts, decisions, or attempts at behavior change, R - Reflected patient's change talk and S - Summarized patient's change talk statements      Answers for HPI/ROS submitted by the patient on 4/7/2021   LIZZETTE 7 TOTAL SCORE: 9  If you checked off any problems, how difficult have these problems made it for you to do your work, take care of things at home, or get along with other people?: Very difficult  PHQ9 TOTAL SCORE: 7*    *No SI    Answers for HPI/ROS submitted by the patient on 10/13/2020   If you checked off any problems, how difficult have these problems made it for you to do your work, take care of things at home, or get along with other people?: Somewhat difficult  PHQ9 TOTAL SCORE: 8*  LIZZETTE 7 TOTAL SCORE: 6      *No SI     Answers for HPI/ROS submitted by the patient on 12/29/2020   If you checked off any problems, how difficult have these problems made it for you to do your work, take care of things at home, or get along with other people?: Somewhat difficult  PHQ9 TOTAL SCORE: 5*  LIZZETTE 7 TOTAL SCORE: 8    *No SI     Supportive counseling used    Solution-focused therapy used    Care Plan review completed: not today    Medication Review:  No changes to current psychiatric medication(s)     Medication Compliance:  Yes    Changes in  Health Issues:   None reported    Chemical Use Review:   Substance Use: Chemical use reviewed, no active concerns identified      Tobacco Use: No current tobacco use.         Assessment: Current Emotional / Mental Status (status of significant symptoms):  Risk status (Self / Other harm or suicidal ideation)  Patient denies a history of suicidal ideation, suicide attempts, self-injurious behavior, homicidal ideation, homicidal behavior and and other safety concerns     Patient denies current fears or concerns for personal safety.  Patient denies current or recent suicidal ideation or behaviors.  Patient denies current or recent homicidal ideation or behaviors.  Patient denies current or recent self injurious behavior or ideation.  Patient denies other safety concerns.     A safety and risk management plan has not been developed at this time, however patient was encouraged to call [place] [phone] should there be a change in any of these risk factors.    [place] Suicide Severity Rating Scale (Short Version)  [place] Suicide Severity Rating (Short Version) 1/22/2019 1/24/2019 11/10/2019 12/2/2019 12/10/2019 6/11/2020 7/1/2020   Over the past 2 weeks have you felt down, depressed, or hopeless? - - no yes yes no no   Over the past 2 weeks have you had thoughts of killing yourself? - - no yes no no no   Comments - - - lots of stressors, has thought about not taking meds - - -   Have you ever attempted to kill yourself? - - no no no no no   Q1 Wished to be Dead (Past Month) no no - other (see comments) no - -   Comments - - - why did i do this surgery - - -   Q2 Suicidal Thoughts (Past Month) no no - no no - -   Comments - - - wishes he wouldn't have gone through surgery - - -   Q3 Suicidal Thought Method - - - no no - -   Q4 Suicidal Intent without Specific Plan - - - no no - -   Q5 Suicide Intent with Specific Plan - - - no no - -   Q6 Suicide Behavior (Lifetime) no no - no no - -         Appearance:   Appropriate    Eye Contact:   Good   Psychomotor Behavior: Restless   Attitude:   Cooperative  Interested Friendly Pleasant  Orientation:   All  Speech   Rate / Production: Normal/ Responsive   Volume:  Normal   Mood:    Normal  Affect:    Appropriate    Thought Content:  Clear   Thought Form:  Goal Directed  Logical   Insight:    Good     Diagnoses:  1. Bipolar 1 disorder, depressed, mild (H)          Collateral Reports Completed:  Not Applicable    Plan: (Homework, other):  Continue with this writer for individual psychotherapy in three weeks.He will continue working on his goals with exercise, sleep, and organization. We agreed to continue talking about getting him back to [employer] to help cope better. We will check in about his trip to [place]. Avoid mood-altering substances.     Joseph Murillo, LP  June 7, 2021        ______________________________________________________________________    [facility] Integrated Behavioral Health Treatment Plan    Client's Name: Frandy Workman  YOB: 1965    Date: 4/7/2021    DSM-V Diagnoses: 296.51 Bipolar I Disorder Current or Most Recent Episode Depressed, Mild;     Clinical Global Impressions  First:  Considering your total clinical experience with this particular patient population, how severe are the patient's symptoms at this time?: 4 (12/18/2020  2:22 PM)      Most recent:  Compared to the patient's condition at the START of treatment, this patient's condition is: 2 (12/18/2020  2:22 PM)        Referral / Collaboration:  Will collaborate with care team as indicated during treatment.    Anticipated number of session or this episode of care: 10+      MeasurableTreatment Goal(s) related to diagnosis / functional impairment(s)  Goal 1:  Patient will experience a reduction in depressive and anxious symptoms, along with a corresponding increase in positive emotion and life satisfaction.    Objective #A: Patient will experience a reduction in depressed mood, will develop more effective  coping skills to manage depressive symptoms, will develop healthy cognitive patterns and beliefs, will increase ability to function adaptively and will continue to take medications as prescribed / participate in supportive activities and services    Status: Continued - Date(s): 4/7/2021    Objective #B: Patient will experience a reduction in anxiety, will develop more effective coping skills to manage anxiety symptoms, will develop healthy cognitive patterns and beliefs and will increase ability to function adaptively  Status: Continued - Date(s):4/7/2021    Objective #C: Patient will develop better understanding of triggers and coping strategies to stabilize mood  Status: Continued - Date(s): 4/7/2021    Goal 2:  Patient will identify and increase engagement in valued activity, i.e. improving social connections/relationships, pursuing occupational goals or personally meaningful pursuits, exploration of meaning in life.     Objective #A: Patient will identify meaningful activity in social, occupational and  personal goals, and increase behavioral activation around these goals   Status: Continued - Date(s): 4/7/2021    Objective #B: Patient will address relationship difficulties in a more adaptive manner  Status: Continued - Date(s): 4/7/2021    Objective #C: Patient will develop coping/problem-solving skills to facilitate more adaptive adjustment and will effectively address problems that interfere with adaptive functioning  Status: Continued - Date(s): 4/7/2021        Possible Therapeutic Intervention(s)  Psycho-education regarding mental health diagnoses and treatment options    Skills training    Explore skills useful to client in current situation.    Skills include assertiveness, communication, conflict management, problem-solving, relaxation, etc.    Solution-Focused Therapy    Explore patterns in patient's relationships and discuss options for new behaviors.    Explore patterns in patient's actions and  choices and discuss options for new behaviors.    Cognitive-behavioral Therapy    Discuss common cognitive distortions, identify them in patient's life.    Explore ways to challenge, replace, and act against these cognitions.    Acceptance and Commitment Therapy    Explore and identify important values in patient's life.    Discuss ways to commit to behavioral activation around these values.    Psychodynamic psychotherapy    Discuss patient's emotional dynamics and issues and how they impact behaviors.    Explore patient's history of relationships and how they impact present behaviors.    Explore how to work with and make changes in these schemas and patterns.    Narrative Therapy    Explore the patient's story of his/her life from his/her perspective.    Explore alternate ways of understanding their experience, identifying exceptions, developing new themes.    Interpersonal Psychotherapy    Explore patterns in relationships that are effective or ineffective at helping patient reach their goals, find satisfying experience.    Discuss new patterns or behaviors to engage in for improved social functioning.    Behavioral Activation    Discuss steps patient can take to become more involved in meaningful activity.    Identify barriers to these activities and explore possible solutions.    Mindfulness-Based Strategies    Discuss skills based on development and application of mindfulness.    Skills drawn from compassion-focused therapy, dialectical behavior therapy, mindfulness-based stress reduction, mindfulness-based cognitive therapy, etc.      We have developed these goals together during our work to this point. Patient has assisted in the development of these goals and has agreed to this treatment plan.       Joseph Murillo LP  4/7/2021

## 2021-06-07 NOTE — PROGRESS NOTES
Miller is a 56 year old who is being evaluated via a billable telephone visit.      What phone number would you like to be contacted at? [phone]    How would you like to obtain your AVS? Jordan     Phone call duration: 30 minutes      Oriana MATTHEWS CMA

## 2021-06-08 ENCOUNTER — TELEPHONE (OUTPATIENT)
Dept: PHARMACY | Facility: CLINIC | Age: 56
End: 2021-06-08

## 2021-06-08 DIAGNOSIS — D63.1 ANEMIA OF CHRONIC RENAL FAILURE, STAGE 3B (H): ICD-10-CM

## 2021-06-08 DIAGNOSIS — N18.32 STAGE 3B CHRONIC KIDNEY DISEASE (H): Primary | ICD-10-CM

## 2021-06-08 DIAGNOSIS — N18.32 ANEMIA OF CHRONIC RENAL FAILURE, STAGE 3B (H): ICD-10-CM

## 2021-06-08 DIAGNOSIS — D63.1 ANEMIA IN STAGE 3A CHRONIC KIDNEY DISEASE (H): Primary | ICD-10-CM

## 2021-06-08 DIAGNOSIS — N18.31 ANEMIA IN STAGE 3A CHRONIC KIDNEY DISEASE (H): Primary | ICD-10-CM

## 2021-06-08 DIAGNOSIS — N18.30 STAGE 3 CHRONIC KIDNEY DISEASE, UNSPECIFIED WHETHER STAGE 3A OR 3B CKD (H): ICD-10-CM

## 2021-06-08 NOTE — TELEPHONE ENCOUNTER
Anemia Management Note - Enrollment  SUBJECTIVE/OBJECTIVE:    Referred by Dr. Eloy Rao on 2021  Primary Diagnosis: Anemia in Chronic Kidney Disease (N18.3, D63.1)   3b  Secondary Diagnosis:  Chronic Kidney Disease, Stage 3 (N18.3)  3b  Liver Tx: 2019  Hgb goal range:  9-10  Epo/Darbo: Aranesp 40mcg every 14 days for Hgb <10.  In Clinic.   Iron regimen:  NA.  Iron levels stable  Labs : 2022  Recent IVELISSE use, transfusion, IV iron: Aranesp   RX/TX plans :  6/10/2022    No history of stroke, MI and blood clots.  History of hepatocellular carcinoma - s/p TACE 2019 and liver transplant 2019.    Contact:  Ok to leave message regarding scheduling, medical, and billing per consent to communicate dated 10/2/19                              OK to speak with Davi Saunders (friend/POA) regarding scheduling, medical, and billing per consent to communicate dated 10/2/19  Anemia Latest Ref Rng & Units 2020 2021 2021 3/26/2021 2021 2021 2021   IVELISSE Dose - - - - - - - -   Hemoglobin 13.3 - 17.7 g/dL 12.5(L) 13.3 13.9 13.0(L) 12.1(L) 11.6(L) 9.0(L)   TSAT 15 - 46 % - - - - - - 59(H)   Ferritin 26 - 388 ng/mL - - - - - - 399(H)   PRBCs - - - - - - - -       BP Readings from Last 3 Encounters:   21 124/74   21 129/84   20 105/64     Wt Readings from Last 2 Encounters:   21 173 lb 11.2 oz (78.8 kg)   20 173 lb (78.5 kg)     Current Outpatient Medications   Medication Sig Dispense Refill     Acetaminophen (TYLENOL) 325 MG CAPS Take 325-650 mg by mouth every 4 hours as needed (pain, fever) 100 capsule 1     ARIPiprazole (ABILIFY) 5 MG tablet daily       Artificial Tear Solution (SM ARTIFICIAL TEARS) SOLN Place 1 drop into the right eye every hour as needed (dry eyes) Apply at least 4 times daily and as needed for dry eye 15 mL 1     aspirin 81 MG EC tablet Take 1 tablet (81 mg) by mouth daily 90 tablet 1     BD VIKTORIA U/F 32G X 4 MM insulin pen  needle Use 5 per day 300 each 3     carvedilol (COREG) 3.125 MG tablet Take 1 tablet (3.125 mg) by mouth 2 times daily (with meals) 60 tablet 5     ciclopirox (LOPROX) 0.77 % cream Apply topically 2 times daily To feet and toenails. 90 g 5     Continuous Blood Gluc  (FREESTYLE CLAUDIA 14 DAY READER) CECILIO Use to read blood sugars as per 's instructions. 1 each 0     Continuous Blood Gluc Sensor (FREESTYLE CALUDIA 14 DAY SENSOR) MISC Change every 14 days. 9 each 3     empagliflozin (JARDIANCE) 25 MG TABS tablet Take 1 tablet (25 mg) by mouth daily 90 tablet 1     gabapentin (NEURONTIN) 300 MG capsule Take 300 mg by mouth 3 times daily       glucose (BD GLUCOSE) 4 g chewable tablet Take 1 tablet by mouth every hour as needed for low blood sugar 60 tablet 1     insulin aspart (NOVOLOG PEN) 100 UNIT/ML pen Inject 1-15 Units Subcutaneous 3 times daily (with meals) 20 mL 3     insulin degludec (TRESIBA FLEXTOUCH) 100 UNIT/ML pen Inject 27 units subcutaneously daily 25 mL 3     lamiVUDine (EPIVIR) 100 MG tablet Take 1 tablet (100 mg) by mouth daily 90 tablet 3     Multiple Vitamin (THERAVITE PO) Take 1 tablet by mouth every morning        mycophenolate (GENERIC EQUIVALENT) 250 MG capsule Take 3 capsules (750 mg) by mouth every 12 hours 180 capsule 11     order for DME 1 Device by Device route daily Knee high compression socks 15-20 mmhg. 1 Device 0     pantoprazole (PROTONIX) 40 MG EC tablet TAKE ONE TABLET BY MOUTH EVERY MORNING BEFORE BREAKFAST 90 tablet 3     polyethylene glycol (MIRALAX) 17 g packet Take 1 packet by mouth daily       predniSONE (DELTASONE) 5 MG tablet Take 1 tablet (5 mg) by mouth daily 90 tablet 3     rosuvastatin (CRESTOR) 5 MG tablet Take 1 tablet (5 mg) by mouth daily 90 tablet 3     tamsulosin (FLOMAX) 0.4 MG capsule Take 1 capsule (0.4 mg) by mouth daily 90 capsule 3     vitamin B-12 (CYANOCOBALAMIN) 1000 MCG tablet Take 1 tablet (1,000 mcg) by mouth daily 30 tablet 11     vitamin  D3 (CHOLECALCIFEROL) 50 mcg (2000 units) tablet Take 1 tablet (50 mcg) by mouth daily 90 tablet 3     zolpidem (AMBIEN) 10 MG tablet Take 10 mg by mouth daily       ASSESSMENT:  Hgb Not at goal/Initiation of therapy   Ferritin: At goal (>100ng/mL)  TSat: elevated at >50%  Iron regimen recommended: NA  Recommended IVELISSE regimen: TBD  Blood Pressure: Stable    PLAN:  1. Patient called today for enrollment in Anemia Management Service.    2. Discussed:  anemia overview, monitoring service and goal hemoglobin range and rationale and risks of IVELISSE blood clots, stroke and increase in blood pressure  3. Dose location: [place]  4. Labs: [place]  5. Pharmacy: [facility]    Miller's phone is not accepting calls. Will try to reach Miller again tomorrow.     Update: Spoke with Miller, he is feeling pretty good.  He was surprised that his Hgb dropped. He want to have labs checked again in 1 month before he starts Aranesp again.     Miller called 6/10/21 and LVM that he wants to get started on Aranesp. Therapy plan entered.  Spoke with Miller, gave him the Ph # to [facility] [phone]    Next call date:  6/17/21     Jie Blum RN   Anemia Services  [facility]  [address] [place] [id number]   [email or web address]   Office : [phone]  Fax: [phone]

## 2021-06-10 PROBLEM — D64.9 LOW HEMOGLOBIN: Status: RESOLVED | Noted: 2020-02-04 | Resolved: 2021-06-10

## 2021-06-10 PROBLEM — D63.1 ANEMIA OF CHRONIC RENAL FAILURE, STAGE 3B (H): Status: ACTIVE | Noted: 2021-06-10

## 2021-06-10 PROBLEM — N18.32 ANEMIA OF CHRONIC RENAL FAILURE, STAGE 3B (H): Status: ACTIVE | Noted: 2021-06-10

## 2021-06-10 PROBLEM — D64.9 ANEMIA: Status: RESOLVED | Noted: 2019-12-06 | Resolved: 2021-06-10

## 2021-06-10 PROBLEM — N18.4 CKD (CHRONIC KIDNEY DISEASE) STAGE 4, GFR 15-29 ML/MIN (H): Status: RESOLVED | Noted: 2020-03-10 | Resolved: 2021-06-10

## 2021-06-10 PROBLEM — N18.32 STAGE 3B CHRONIC KIDNEY DISEASE (H): Status: ACTIVE | Noted: 2021-06-10

## 2021-06-10 RX ORDER — METHYLPREDNISOLONE SODIUM SUCCINATE 125 MG/2ML
125 INJECTION, POWDER, LYOPHILIZED, FOR SOLUTION INTRAMUSCULAR; INTRAVENOUS
Status: CANCELLED
Start: 2021-06-10

## 2021-06-10 RX ORDER — DIPHENHYDRAMINE HYDROCHLORIDE 50 MG/ML
50 INJECTION INTRAMUSCULAR; INTRAVENOUS
Status: CANCELLED
Start: 2021-06-10

## 2021-06-10 RX ORDER — EPINEPHRINE 1 MG/ML
0.3 INJECTION, SOLUTION, CONCENTRATE INTRAVENOUS EVERY 5 MIN PRN
Status: CANCELLED | OUTPATIENT
Start: 2021-06-10

## 2021-06-10 RX ORDER — NALOXONE HYDROCHLORIDE 0.4 MG/ML
0.2 INJECTION, SOLUTION INTRAMUSCULAR; INTRAVENOUS; SUBCUTANEOUS
Status: CANCELLED | OUTPATIENT
Start: 2021-06-10

## 2021-06-10 RX ORDER — ALBUTEROL SULFATE 5 MG/ML
2.5 SOLUTION RESPIRATORY (INHALATION)
Status: CANCELLED | OUTPATIENT
Start: 2021-06-10

## 2021-06-10 RX ORDER — ALBUTEROL SULFATE 90 UG/1
1-2 AEROSOL, METERED RESPIRATORY (INHALATION)
Status: CANCELLED
Start: 2021-06-10

## 2021-06-10 RX ORDER — MEPERIDINE HYDROCHLORIDE 25 MG/ML
25 INJECTION INTRAMUSCULAR; INTRAVENOUS; SUBCUTANEOUS EVERY 30 MIN PRN
Status: CANCELLED | OUTPATIENT
Start: 2021-06-10

## 2021-06-10 NOTE — TELEPHONE ENCOUNTER
Miller called, He wants to get started on Aranesp.     Jie Blum RN   Anemia Services  [address] [address]   [email or web address]   Office : [phone]  Fax: [phone]

## 2021-06-14 ENCOUNTER — ALLIED HEALTH/NURSE VISIT (OUTPATIENT)
Dept: TRANSPLANT | Facility: CLINIC | Age: 56
End: 2021-06-14
Attending: INTERNAL MEDICINE
Payer: MEDICARE

## 2021-06-14 DIAGNOSIS — N18.32 ANEMIA OF CHRONIC RENAL FAILURE, STAGE 3B (H): ICD-10-CM

## 2021-06-14 DIAGNOSIS — N18.32 STAGE 3B CHRONIC KIDNEY DISEASE (H): Primary | ICD-10-CM

## 2021-06-14 DIAGNOSIS — D63.1 ANEMIA OF CHRONIC RENAL FAILURE, STAGE 3B (H): ICD-10-CM

## 2021-06-14 DIAGNOSIS — Z94.4 STATUS POST LIVER TRANSPLANTATION (H): ICD-10-CM

## 2021-06-14 LAB
ALBUMIN SERPL-MCNC: 4.1 G/DL (ref 3.4–5)
ALP SERPL-CCNC: 106 U/L (ref 40–150)
ALT SERPL W P-5'-P-CCNC: 21 U/L (ref 0–70)
ANION GAP SERPL CALCULATED.3IONS-SCNC: 7 MMOL/L (ref 3–14)
AST SERPL W P-5'-P-CCNC: 8 U/L (ref 0–45)
BILIRUB DIRECT SERPL-MCNC: 0.1 MG/DL (ref 0–0.2)
BILIRUB SERPL-MCNC: 0.6 MG/DL (ref 0.2–1.3)
BUN SERPL-MCNC: 29 MG/DL (ref 7–30)
CALCIUM SERPL-MCNC: 9.8 MG/DL (ref 8.5–10.1)
CHLORIDE SERPL-SCNC: 106 MMOL/L (ref 94–109)
CO2 SERPL-SCNC: 26 MMOL/L (ref 20–32)
CREAT SERPL-MCNC: 1.54 MG/DL (ref 0.66–1.25)
ERYTHROCYTE [DISTWIDTH] IN BLOOD BY AUTOMATED COUNT: 14.7 % (ref 10–15)
GFR SERPL CREATININE-BSD FRML MDRD: 49 ML/MIN/{1.73_M2}
GLUCOSE SERPL-MCNC: 173 MG/DL (ref 70–99)
HCT VFR BLD AUTO: 26.4 % (ref 40–53)
HGB BLD-MCNC: 8.8 G/DL (ref 13.3–17.7)
MCH RBC QN AUTO: 31.9 PG (ref 26.5–33)
MCHC RBC AUTO-ENTMCNC: 33.3 G/DL (ref 31.5–36.5)
MCV RBC AUTO: 96 FL (ref 78–100)
PLATELET # BLD AUTO: 151 10E9/L (ref 150–450)
POTASSIUM SERPL-SCNC: 4.7 MMOL/L (ref 3.4–5.3)
PROT SERPL-MCNC: 7.8 G/DL (ref 6.8–8.8)
RBC # BLD AUTO: 2.76 10E12/L (ref 4.4–5.9)
SODIUM SERPL-SCNC: 139 MMOL/L (ref 133–144)
WBC # BLD AUTO: 6.1 10E9/L (ref 4–11)

## 2021-06-14 PROCEDURE — 85027 COMPLETE CBC AUTOMATED: CPT | Performed by: PATHOLOGY

## 2021-06-14 PROCEDURE — 36415 COLL VENOUS BLD VENIPUNCTURE: CPT | Performed by: PATHOLOGY

## 2021-06-14 PROCEDURE — 96372 THER/PROPH/DIAG INJ SC/IM: CPT | Performed by: INTERNAL MEDICINE

## 2021-06-14 PROCEDURE — 80076 HEPATIC FUNCTION PANEL: CPT | Performed by: PATHOLOGY

## 2021-06-14 PROCEDURE — 80048 BASIC METABOLIC PNL TOTAL CA: CPT | Performed by: PATHOLOGY

## 2021-06-14 PROCEDURE — 250N000011 HC RX IP 250 OP 636: Mod: EC | Performed by: INTERNAL MEDICINE

## 2021-06-14 RX ORDER — ALBUTEROL SULFATE 5 MG/ML
2.5 SOLUTION RESPIRATORY (INHALATION)
Status: CANCELLED | OUTPATIENT
Start: 2021-06-14

## 2021-06-14 RX ORDER — MEPERIDINE HYDROCHLORIDE 25 MG/ML
25 INJECTION INTRAMUSCULAR; INTRAVENOUS; SUBCUTANEOUS EVERY 30 MIN PRN
Status: CANCELLED | OUTPATIENT
Start: 2021-06-14

## 2021-06-14 RX ORDER — ALBUTEROL SULFATE 90 UG/1
1-2 AEROSOL, METERED RESPIRATORY (INHALATION)
Status: CANCELLED
Start: 2021-06-14

## 2021-06-14 RX ORDER — METHYLPREDNISOLONE SODIUM SUCCINATE 125 MG/2ML
125 INJECTION, POWDER, LYOPHILIZED, FOR SOLUTION INTRAMUSCULAR; INTRAVENOUS
Status: CANCELLED
Start: 2021-06-14

## 2021-06-14 RX ORDER — DIPHENHYDRAMINE HYDROCHLORIDE 50 MG/ML
50 INJECTION INTRAMUSCULAR; INTRAVENOUS
Status: CANCELLED
Start: 2021-06-14

## 2021-06-14 RX ORDER — EPINEPHRINE 1 MG/ML
0.3 INJECTION, SOLUTION, CONCENTRATE INTRAVENOUS EVERY 5 MIN PRN
Status: CANCELLED | OUTPATIENT
Start: 2021-06-14

## 2021-06-14 RX ORDER — NALOXONE HYDROCHLORIDE 0.4 MG/ML
0.2 INJECTION, SOLUTION INTRAMUSCULAR; INTRAVENOUS; SUBCUTANEOUS
Status: CANCELLED | OUTPATIENT
Start: 2021-06-14

## 2021-06-14 RX ADMIN — DARBEPOETIN ALFA 40 MCG: 40 INJECTION, SOLUTION INTRAVENOUS; SUBCUTANEOUS at 13:55

## 2021-06-14 NOTE — NURSING NOTE
Frequency: Every 14 days  Most recent or today's HGB: 8.8   Date: 2021  Date of lat dose: Initial Dose    Blood Pressure:119/71    Diagnosis: Anemia in CKD stage 3b    Ordered by: Eloy Rao MD  VIS Offered: Yes    Double Checked by: Jesus OVALLES    See MAR for administration details    Pt's first name, last name and  verified prior to medication administration, injection given without complications or questions.       Anne-Marie Boykin, [facility]  2021 1:54 PM

## 2021-06-16 ENCOUNTER — TELEPHONE (OUTPATIENT)
Dept: PHARMACY | Facility: CLINIC | Age: 56
End: 2021-06-16

## 2021-06-16 NOTE — TELEPHONE ENCOUNTER
Anemia Management Note  SUBJECTIVE/OBJECTIVE:  Referred by Dr. Eloy Rao on 2021  Primary Diagnosis: Anemia in Chronic Kidney Disease (N18.3, D63.1)   3b  Secondary Diagnosis:  Chronic Kidney Disease, Stage 3 (N18.3)  3b  Liver Tx: 2019  Hgb goal range:  9-10  Epo/Darbo: Aranesp 40mcg every 14 days for Hgb <10.  In Clinic.   Iron regimen:  NA.  Iron levels stable  Labs : 2022  Recent IVELISSE use, transfusion, IV iron: Aranesp   RX/TX plans :  6/10/2022     No history of stroke, MI and blood clots.  History of hepatocellular carcinoma - s/p TACE 2019 and liver transplant 2019.     Contact:            Ok to leave message regarding scheduling, medical, and billing per consent to communicate dated 10/2/19                              OK to speak with Davi Saunders (friend/POA) regarding scheduling, medical, and billing per consent to communicate dated 10/2/19    Anemia Latest Ref Rng & Units 2021 2021 3/26/2021 2021 2021 2021 2021   IVELISSE Dose - - - - - - - 40mcg   Hemoglobin 13.3 - 17.7 g/dL 13.3 13.9 13.0(L) 12.1(L) 11.6(L) 9.0(L) 8.8(L)   TSAT 15 - 46 % - - - - - 59(H) -   Ferritin 26 - 388 ng/mL - - - - - 399(H) -   PRBCs - - - - - - - -     BP Readings from Last 3 Encounters:   21 124/74   21 129/84   20 105/64     Wt Readings from Last 2 Encounters:   21 173 lb 11.2 oz (78.8 kg)   20 173 lb (78.5 kg)           ASSESSMENT:  Hgb:Not at goal/Initiation of therapy  TSat: elevated at >50% Ferritin: At goal (>100ng/mL)    PLAN:  Dose with aranesp and RTC for hgb then aranesp if needed in 2 week(s)    Orders needed to be renewed (for next follow-up date) in EPIC: None    Iron labs due:  2021    Plan discussed with:  No call today, chart review  Plan provided by:  adri    NEXT FOLLOW-UP DATE:  21  1:30p appt    Jie Blum RN   Anemia Services  [address] [place] [address]    [email or web address]   Office : [phone]  Fax: [phone]

## 2021-06-21 ENCOUNTER — TELEPHONE (OUTPATIENT)
Dept: BEHAVIORAL HEALTH | Facility: CLINIC | Age: 56
End: 2021-06-21
Payer: MEDICARE

## 2021-06-21 NOTE — TELEPHONE ENCOUNTER
[facility] Phone Encounter    Encounter Activities (Refresh/Reselect every encounter): Phone Encounter  Type of Contact Today: Phone call (patient / identified key support person reached)  Date of phone call: June 21, 2021                   Presenting Issue: I returned Miller's message from 6/14/2021. Miller is requesting a letter conveying DOS information to be sent to his [facility] office. He did not need other information in the letter. I informed Miller I could send him a letter with DOS information via [employer] so he could hand it directly to them. See Letters tab for this.      Joseph Murillo Psy.D, LP   Behavioral Health Clinician   [facility]     June 21, 2021

## 2021-06-22 ENCOUNTER — MEDICAL CORRESPONDENCE (OUTPATIENT)
Dept: HEALTH INFORMATION MANAGEMENT | Facility: CLINIC | Age: 56
End: 2021-06-22

## 2021-06-25 ENCOUNTER — VIRTUAL VISIT (OUTPATIENT)
Dept: BEHAVIORAL HEALTH | Facility: CLINIC | Age: 56
End: 2021-06-25
Payer: MEDICARE

## 2021-06-25 DIAGNOSIS — F31.31 BIPOLAR 1 DISORDER, DEPRESSED, MILD (H): Primary | ICD-10-CM

## 2021-06-25 DIAGNOSIS — F41.1 GENERALIZED ANXIETY DISORDER: ICD-10-CM

## 2021-06-25 PROCEDURE — 90834 PSYTX W PT 45 MINUTES: CPT | Mod: 95 | Performed by: PSYCHOLOGIST

## 2021-06-25 NOTE — PROGRESS NOTES
[facility]: Integrated Behavioral Health  June 25, 2021      Behavioral Health Clinician Progress Note    Patient Name: Frandy Workman           Service Type: Video visit      Service Location:  Video visit      Session Start Time: 2:00  Session End Time: 2:40      Session Length: 38 - 52      Attendees: Patient    Visit Activities (Refresh list every visit): [facility] Only     Telemedicine Visit: The patient's condition can be safely assessed and treated via synchronous audio and visual telemedicine encounter.      Reason for Telemedicine Visit: COVID-19    Originating Site (Patient Location): Patient's home    Distant Site (Provider Location): Provider Remote Setting    Consent:  The patient/guardian has verbally consented to: the potential risks and benefits of telemedicine (video visit) versus in person care; bill my insurance or make self-payment for services provided; and responsibility for payment of non-covered services.     Mode of Communication:  Video Conference via [employer]    As the provider I attest to compliance with applicable laws and regulations related to telemedicine.    Diagnostic Assessment Date: 12/03/2020  Treatment Plan Review Date:  7/7/2021  See Flowsheets for today's PHQ-9 and LIZZETTE-7 results  Previous PHQ-9:   PHQ-9 SCORE 10/13/2020 12/29/2020 4/7/2021   PHQ-9 Total Score MyChart 8 (Mild depression) 5 (Mild depression) 7 (Mild depression)   PHQ-9 Total Score 8 5 7     Previous LIZZETTE-7:   LIZZETTE-7 SCORE 10/13/2020 12/29/2020 4/7/2021   Total Score 6 (mild anxiety) 8 (mild anxiety) 9 (mild anxiety)   Total Score 6 8 9     Relationships   Social connections     Talks on phone: Once a week     Gets together: Once a week     Attends [employer] service: Never     Active member of club or organization: No     Attends meetings of clubs or organizations: Never     Relationship status:       HEATH LEVEL:  No flowsheet data found.    DATA  Extended Session (60+  minutes): No  Interactive Complexity: No  Crisis: No    Treatment Objective(s) Addressed in This Session:  Target Behavior(s): disease management/lifestyle changes related to depressive and anxiety symptoms    Depression and anxiety management    Current Stressors / Issues:  Miller presented today for a therapy visit follow-up. Our session focused on continuing to help Miller with depression and anxiety concerns related to his health and relationships.     Miller denied having agenda items today, though we spent time discussing a few previous topics to review his progress.     Low hemoglobin levels - this is making him tired/lethargic throughout the day. More difficult to do things he reports. Discussed his plans to get this evaluated further next week.     Organization at home - Miller is feeling a little more disorganized lately because he is having less energy. Discussed ways he could pace himself more, take smaller steps with goals he sets for himself. Reviewed our work toward starting with easy tasks first to not feel as overwhelmed/daunted by tasks. Explored how he will work on this with paying bills this week.     [employer]-ism - Miller states he is still thinking about going back to [employer], but is not ready yet. We didn't dive too much into this today.     Social support - We explored how supported he feels right now with social connections. Encouraged Miller to return to the transplant support group to get more support which he agreed would be helpful for him. We picked a date together of when he plans to go back and we talked about putting it on his calendar.     Situation with his daughter - this continues to bother him, he is wondering about his role with it and thinks about her often. We continued to work on processing thoughts and feelings about this during our session today.     Progress on Treatment Objective(s) / Homework:  Satisfactory progress - ACTION (Actively working towards change); Intervened by reinforcing change  plan / affirming steps taken    Supportive and solution-focused counseling emphasized today    Motivational Interviewing  Target Behavior: disease management/lifestyle changes related to anxiety,  sleep, exercise, and organization    Stage of Change: ACTION (Actively working towards change)    MI Intervention: Co-Developed Goal: improve engagement with sleep hygiene behaviors, physical activity, and organization, Expressed Empathy/Understanding, Supported Autonomy, Collaboration, Evocation, Permission to raise concern or advise, Open-ended questions, Reflections: simple and complex, Rolled with resistance: Emphasized patient autonomy, Simple reflection, Complex reflection, Amplified reflection (weaker or stronger meaning), Shifted topic to defuse resistance, Reframed sustain talk in the direction of change and Evoked patient agenda, Change talk (evoked) and Reframe     Change Talk Expressed by the Patient: Ability to change Reasons to change Need to change    Provider Response to Change Talk: E - Evoked more info from patient about behavior change, A - Affirmed patient's thoughts, decisions, or attempts at behavior change, R - Reflected patient's change talk and S - Summarized patient's change talk statements      Answers for HPI/ROS submitted by the patient on 4/7/2021   LIZZETTE 7 TOTAL SCORE: 9  If you checked off any problems, how difficult have these problems made it for you to do your work, take care of things at home, or get along with other people?: Very difficult  PHQ9 TOTAL SCORE: 7*    *No SI    Answers for HPI/ROS submitted by the patient on 10/13/2020   If you checked off any problems, how difficult have these problems made it for you to do your work, take care of things at home, or get along with other people?: Somewhat difficult  PHQ9 TOTAL SCORE: 8*  LIZZETTE 7 TOTAL SCORE: 6      *No SI     Answers for HPI/ROS submitted by the patient on 12/29/2020   If you checked off any problems, how difficult have these  problems made it for you to do your work, take care of things at home, or get along with other people?: Somewhat difficult  PHQ9 TOTAL SCORE: 5*  LIZZETTE 7 TOTAL SCORE: 8    *No SI     Supportive counseling used    Solution-focused therapy used    Care Plan review completed: not today    Medication Review:  No changes to current psychiatric medication(s)     Medication Compliance:  Yes    Changes in Health Issues:   None reported    Chemical Use Review:   Substance Use: Chemical use reviewed, no active concerns identified      Tobacco Use: No current tobacco use.         Assessment: Current Emotional / Mental Status (status of significant symptoms):  Risk status (Self / Other harm or suicidal ideation)  Patient denies a history of suicidal ideation, suicide attempts, self-injurious behavior, homicidal ideation, homicidal behavior and and other safety concerns     Patient denies current fears or concerns for personal safety.  Patient denies current or recent suicidal ideation or behaviors.  Patient denies current or recent homicidal ideation or behaviors.  Patient denies current or recent self injurious behavior or ideation.  Patient denies other safety concerns.     A safety and risk management plan has not been developed at this time, however patient was encouraged to call [place] [phone] should there be a change in any of these risk factors.    [place] Suicide Severity Rating Scale (Short Version)  [place] Suicide Severity Rating (Short Version) 1/22/2019 1/24/2019 11/10/2019 12/2/2019 12/10/2019 6/11/2020 7/1/2020   Over the past 2 weeks have you felt down, depressed, or hopeless? - - no yes yes no no   Over the past 2 weeks have you had thoughts of killing yourself? - - no yes no no no   Comments - - - lots of stressors, has thought about not taking meds - - -   Have you ever attempted to kill yourself? - - no no no no no   Q1 Wished to be Dead (Past Month) no no - other (see comments) no - -   Comments - - -  why did i do this surgery - - -   Q2 Suicidal Thoughts (Past Month) no no - no no - -   Comments - - - wishes he wouldn't have gone through surgery - - -   Q3 Suicidal Thought Method - - - no no - -   Q4 Suicidal Intent without Specific Plan - - - no no - -   Q5 Suicide Intent with Specific Plan - - - no no - -   Q6 Suicide Behavior (Lifetime) no no - no no - -         Appearance:   Appropriate   Eye Contact:   Good   Psychomotor Behavior: Restless   Attitude:   Cooperative  Interested Friendly Pleasant  Orientation:   All  Speech   Rate / Production: Normal/ Responsive   Volume:  Normal   Mood:    Normal  Affect:    Appropriate    Thought Content:  Clear   Thought Form:  Goal Directed  Logical   Insight:    Good     Diagnoses:  1. Bipolar 1 disorder, depressed, mild (H)    2. Generalized anxiety disorder          Collateral Reports Completed:  Not Applicable    Plan: (Homework, other):  Continue with this writer for individual psychotherapy in three weeks. He will continue working on his goals with exercise, sleep, and organization. We agreed to continue talking about getting him back to [employer] to help cope better.      Joseph Murillo, LP  June 25, 2021        ______________________________________________________________________    [facility] Integrated [facility]al Health Treatment Plan    Client's Name: Frandy Workman  YOB: 1965    Date: 4/7/2021    DSM-V Diagnoses: 296.51 Bipolar I Disorder Current or Most Recent Episode Depressed, Mild;     Clinical Global Impressions  First:  Considering your total clinical experience with this particular patient population, how severe are the patient's symptoms at this time?: 4 (12/18/2020  2:22 PM)      Most recent:  Compared to the patient's condition at the START of treatment, this patient's condition is: 2 (12/18/2020  2:22 PM)        Referral / Collaboration:  Will collaborate with care team as indicated during treatment.    Anticipated number of session or this  episode of care: 10+      MeasurableTreatment Goal(s) related to diagnosis / functional impairment(s)  Goal 1:  Patient will experience a reduction in depressive and anxious symptoms, along with a corresponding increase in positive emotion and life satisfaction.    Objective #A: Patient will experience a reduction in depressed mood, will develop more effective coping skills to manage depressive symptoms, will develop healthy cognitive patterns and beliefs, will increase ability to function adaptively and will continue to take medications as prescribed / participate in supportive activities and services    Status: Continued - Date(s): 4/7/2021    Objective #B: Patient will experience a reduction in anxiety, will develop more effective coping skills to manage anxiety symptoms, will develop healthy cognitive patterns and beliefs and will increase ability to function adaptively  Status: Continued - Date(s):4/7/2021    Objective #C: Patient will develop better understanding of triggers and coping strategies to stabilize mood  Status: Continued - Date(s): 4/7/2021    Goal 2:  Patient will identify and increase engagement in valued activity, i.e. improving social connections/relationships, pursuing occupational goals or personally meaningful pursuits, exploration of meaning in life.     Objective #A: Patient will identify meaningful activity in social, occupational and  personal goals, and increase behavioral activation around these goals   Status: Continued - Date(s): 4/7/2021    Objective #B: Patient will address relationship difficulties in a more adaptive manner  Status: Continued - Date(s): 4/7/2021    Objective #C: Patient will develop coping/problem-solving skills to facilitate more adaptive adjustment and will effectively address problems that interfere with adaptive functioning  Status: Continued - Date(s): 4/7/2021        Possible Therapeutic Intervention(s)  Psycho-education regarding mental health diagnoses and  treatment options    Skills training    Explore skills useful to client in current situation.    Skills include assertiveness, communication, conflict management, problem-solving, relaxation, etc.    Solution-Focused Therapy    Explore patterns in patient's relationships and discuss options for new behaviors.    Explore patterns in patient's actions and choices and discuss options for new behaviors.    Cognitive-behavioral Therapy    Discuss common cognitive distortions, identify them in patient's life.    Explore ways to challenge, replace, and act against these cognitions.    Acceptance and Commitment Therapy    Explore and identify important values in patient's life.    Discuss ways to commit to behavioral activation around these values.    Psychodynamic psychotherapy    Discuss patient's emotional dynamics and issues and how they impact behaviors.    Explore patient's history of relationships and how they impact present behaviors.    Explore how to work with and make changes in these schemas and patterns.    Narrative Therapy    Explore the patient's story of his/her life from his/her perspective.    Explore alternate ways of understanding their experience, identifying exceptions, developing new themes.    Interpersonal Psychotherapy    Explore patterns in relationships that are effective or ineffective at helping patient reach their goals, find satisfying experience.    Discuss new patterns or behaviors to engage in for improved social functioning.    Behavioral Activation    Discuss steps patient can take to become more involved in meaningful activity.    Identify barriers to these activities and explore possible solutions.    Mindfulness-Based Strategies    Discuss skills based on development and application of mindfulness.    Skills drawn from compassion-focused therapy, dialectical behavior therapy, mindfulness-based stress reduction, mindfulness-based cognitive therapy, etc.      We have developed these  goals together during our work to this point. Patient has assisted in the development of these goals and has agreed to this treatment plan.       Joseph Murillo, RED  4/7/2021

## 2021-06-28 ENCOUNTER — ALLIED HEALTH/NURSE VISIT (OUTPATIENT)
Dept: TRANSPLANT | Facility: CLINIC | Age: 56
End: 2021-06-28
Attending: INTERNAL MEDICINE
Payer: MEDICARE

## 2021-06-28 VITALS — DIASTOLIC BLOOD PRESSURE: 68 MMHG | HEART RATE: 82 BPM | SYSTOLIC BLOOD PRESSURE: 115 MMHG

## 2021-06-28 DIAGNOSIS — N18.32 ANEMIA OF CHRONIC RENAL FAILURE, STAGE 3B (H): ICD-10-CM

## 2021-06-28 DIAGNOSIS — Z94.4 STATUS POST LIVER TRANSPLANTATION (H): ICD-10-CM

## 2021-06-28 DIAGNOSIS — N18.32 STAGE 3B CHRONIC KIDNEY DISEASE (H): Primary | ICD-10-CM

## 2021-06-28 DIAGNOSIS — N18.32 STAGE 3B CHRONIC KIDNEY DISEASE (H): ICD-10-CM

## 2021-06-28 DIAGNOSIS — D63.1 ANEMIA OF CHRONIC RENAL FAILURE, STAGE 3B (H): ICD-10-CM

## 2021-06-28 LAB
ALBUMIN SERPL-MCNC: 4 G/DL (ref 3.4–5)
ALP SERPL-CCNC: 98 U/L (ref 40–150)
ALT SERPL W P-5'-P-CCNC: 20 U/L (ref 0–70)
ANION GAP SERPL CALCULATED.3IONS-SCNC: 5 MMOL/L (ref 3–14)
AST SERPL W P-5'-P-CCNC: 9 U/L (ref 0–45)
BILIRUB DIRECT SERPL-MCNC: 0.1 MG/DL (ref 0–0.2)
BILIRUB SERPL-MCNC: 0.5 MG/DL (ref 0.2–1.3)
BUN SERPL-MCNC: 33 MG/DL (ref 7–30)
CALCIUM SERPL-MCNC: 9.1 MG/DL (ref 8.5–10.1)
CHLORIDE SERPL-SCNC: 107 MMOL/L (ref 94–109)
CO2 SERPL-SCNC: 25 MMOL/L (ref 20–32)
CREAT SERPL-MCNC: 1.62 MG/DL (ref 0.66–1.25)
ERYTHROCYTE [DISTWIDTH] IN BLOOD BY AUTOMATED COUNT: 15.1 % (ref 10–15)
FERRITIN SERPL-MCNC: 495 NG/ML (ref 26–388)
GFR SERPL CREATININE-BSD FRML MDRD: 46 ML/MIN/{1.73_M2}
GLUCOSE SERPL-MCNC: 208 MG/DL (ref 70–99)
HCT VFR BLD AUTO: 22.6 % (ref 40–53)
HGB BLD-MCNC: 7.3 G/DL (ref 13.3–17.7)
IRON SATN MFR SERPL: 50 % (ref 15–46)
IRON SERPL-MCNC: 112 UG/DL (ref 35–180)
MCH RBC QN AUTO: 31.1 PG (ref 26.5–33)
MCHC RBC AUTO-ENTMCNC: 32.3 G/DL (ref 31.5–36.5)
MCV RBC AUTO: 96 FL (ref 78–100)
PLATELET # BLD AUTO: 145 10E9/L (ref 150–450)
POTASSIUM SERPL-SCNC: 4.6 MMOL/L (ref 3.4–5.3)
PROT SERPL-MCNC: 7.4 G/DL (ref 6.8–8.8)
RBC # BLD AUTO: 2.35 10E12/L (ref 4.4–5.9)
SODIUM SERPL-SCNC: 137 MMOL/L (ref 133–144)
TIBC SERPL-MCNC: 226 UG/DL (ref 240–430)
WBC # BLD AUTO: 4.4 10E9/L (ref 4–11)

## 2021-06-28 PROCEDURE — 96372 THER/PROPH/DIAG INJ SC/IM: CPT | Performed by: INTERNAL MEDICINE

## 2021-06-28 PROCEDURE — 82728 ASSAY OF FERRITIN: CPT | Performed by: PATHOLOGY

## 2021-06-28 PROCEDURE — 83550 IRON BINDING TEST: CPT | Performed by: PATHOLOGY

## 2021-06-28 PROCEDURE — 36415 COLL VENOUS BLD VENIPUNCTURE: CPT | Performed by: PATHOLOGY

## 2021-06-28 PROCEDURE — 85027 COMPLETE CBC AUTOMATED: CPT | Performed by: PATHOLOGY

## 2021-06-28 PROCEDURE — 250N000011 HC RX IP 250 OP 636: Performed by: INTERNAL MEDICINE

## 2021-06-28 PROCEDURE — 83540 ASSAY OF IRON: CPT | Performed by: PATHOLOGY

## 2021-06-28 PROCEDURE — 80048 BASIC METABOLIC PNL TOTAL CA: CPT | Performed by: PATHOLOGY

## 2021-06-28 PROCEDURE — 80076 HEPATIC FUNCTION PANEL: CPT | Performed by: PATHOLOGY

## 2021-06-28 RX ORDER — NALOXONE HYDROCHLORIDE 0.4 MG/ML
0.2 INJECTION, SOLUTION INTRAMUSCULAR; INTRAVENOUS; SUBCUTANEOUS
Status: CANCELLED | OUTPATIENT
Start: 2021-06-28

## 2021-06-28 RX ORDER — EPINEPHRINE 1 MG/ML
0.3 INJECTION, SOLUTION, CONCENTRATE INTRAVENOUS EVERY 5 MIN PRN
Status: CANCELLED | OUTPATIENT
Start: 2021-06-28

## 2021-06-28 RX ORDER — MEPERIDINE HYDROCHLORIDE 25 MG/ML
25 INJECTION INTRAMUSCULAR; INTRAVENOUS; SUBCUTANEOUS EVERY 30 MIN PRN
Status: CANCELLED | OUTPATIENT
Start: 2021-06-28

## 2021-06-28 RX ORDER — METHYLPREDNISOLONE SODIUM SUCCINATE 125 MG/2ML
125 INJECTION, POWDER, LYOPHILIZED, FOR SOLUTION INTRAMUSCULAR; INTRAVENOUS
Status: CANCELLED
Start: 2021-06-28

## 2021-06-28 RX ORDER — DIPHENHYDRAMINE HYDROCHLORIDE 50 MG/ML
50 INJECTION INTRAMUSCULAR; INTRAVENOUS
Status: CANCELLED
Start: 2021-06-28

## 2021-06-28 RX ORDER — ALBUTEROL SULFATE 90 UG/1
1-2 AEROSOL, METERED RESPIRATORY (INHALATION)
Status: CANCELLED
Start: 2021-06-28

## 2021-06-28 RX ORDER — ALBUTEROL SULFATE 5 MG/ML
2.5 SOLUTION RESPIRATORY (INHALATION)
Status: CANCELLED | OUTPATIENT
Start: 2021-06-28

## 2021-06-28 RX ADMIN — DARBEPOETIN ALFA 40 MCG: 40 INJECTION, SOLUTION INTRAVENOUS; SUBCUTANEOUS at 14:26

## 2021-06-28 NOTE — PROGRESS NOTES
Frequency: Every 14 days  Most recent or today's HGB: 7.3   Date: 2021  Date of lat dose: 2021  HGB associated with last dose given: 8.8    Blood Pressure:115/68    Diagnosis: Anemia in CKD stage 3b    Ordered by: Eloy Rao MD  VIS Offered: Yes    Double Checked by: Jesus GUERRERO EMT    See MAR for administration details    Pt's first name, last name and  verified prior to medication administration, injection given without complications or questions.

## 2021-06-28 NOTE — PROGRESS NOTES
Outcome for 06/28/21 10:57 AM :Patient is sharing data via clinic device website and patient has been instructed to update data on website. Find login information by using .Nasir Alexander    Frandy Workman  is being evaluated via a billable video visit.      How would you like to obtain your AVS? Mail a copy  For the video visit, send the invitation by: [facility] Website  Will anyone else be joining your video visit? No      Video visit      Start: 3:00  End: 3:25      Diabetes Virtual Consult Note  June 29, 2021        Frandy Workman is a 56 year old male with a past medical history including  has a past medical history of Anemia (2013), Arthritis, BPH (benign prostatic hyperplasia), CAD (coronary artery disease) (4/1/2019), Cholelithiasis, Conductive hearing loss (08/16/2017), Depressive disorder (1986), Gastroesophageal reflux disease (12/01/2014), HCC (hepatocellular carcinoma) (H) (1/22/2019), History of diabetic retinopathy (07/2018), HTN (hypertension), HTN (hypertension) (11/20/2019), Hyperlipidemia, Liver cirrhosis secondary to ESTRADA (H), Liver transplanted (H) (11/11/2019), Portal vein thrombosis (4/11/2019), and Type II diabetes mellitus (H). He also has no past medical history of Asymptomatic human immunodeficiency virus (HIV) infection status (H), Cerebral infarction (H), Congestive heart failure (H), COPD (chronic obstructive pulmonary disease) (H), History of blood transfusion, Infectious mononucleosis, PONV (postoperative nausea and vomiting), Thyroid disease, Tuberculosis, or Uncomplicated asthma. He also struggles with BPAD.      January 2021: advised to increase his Tresiba to 28 units daily with a goal of fasting blood sugar 90-1 40 as advised to increase his Jardiance from 10 mg to 20 mg daily at the time of renewal of this prescription.  He also continued to complain of sweating night sweats and was awaiting a CT scheduled for March 26 as well as seeing psychiatry in regards to this.  We also attempted  to adjust his camilo prescription as he was running out at the end of every month.  His average blood glucose at that time was 180.    Last visit, March 2021: Blood glucose was slightly above goal and he was struggling to keep NovoLog and camilo consistently available. We communicated with pharmacy liaison in hopes of adjusting this. His self-management goal was to more consistently give carbohydrate coverage 1 unit per 15 g ahead of meals. He also planned to continue giving correction of NovoLog 1 unit per 50 greater than 150 prior to meals empagliflozin 10 mg daily, Tresiba 28 units /day and work on getting some physical activity in really each day as well as adding in some resistance training.    Miller was also struggling with recurrent night sweats and had been for several months. He had imaging and follow-up scheduled and pending with primary care oncology and psychiatry.    Interim:    Reviewed notes - has developed profound anemia.  Efra Banuelos and Maira following.      Today:    Frustrated that runs out of CG<S 2-3 days every month and insurance will not give in time for need, has to do fingerstick and wonder  But as things steady these days causes less anxiety than previously, though still a good day as not feeling well otherwise so often worrying might also be low.      Hgb is dropping and is back on shots.  No cause found yet, but should be seen in anemia clinic.  He is very tired, sleeping more, feeling some palpitations, but mostly just sleeping more.      Night sweats have resolved.  Has had some lows at night, but nothing big.      Per CGM S his blood glucose average has improved significantly from 180 at last visit now down to 6.6. This has been achieved without any hypoglycemia blood sugars in range 86% of the time and high 14%.     He is pretty religiously giving his Novolog with meals and now rarely needs an extra unit for BG >180.  Impressed how well it works when he does this consistently.  Any BG  >180 is related to rare indiscretion which he is okay with.    Dm Regimen:   - Tresiba 28 units /day.   -  carbohydrate coverage to 1 unit : 15 g of carbohydrate.   -  Correction Novolounit Novolo mg /dl >180     - Empagliflozin 10 mg daily.      We reviewed glucometer, pump and CGMS data together.  It revealed:    Blood glucose is decreased significantly from last visit were average was 180.        Per CGM S his blood glucose average has improved significantly from 180 at last visit now down to 6.6. This has been achieved without any hypoglycemia blood sugars in range 86% of the time and high 14%.     History of Diabetes monitoring and complications/ prevention:  CAD: yes 2019  Last eye exam results:Treated for NPDMR - last seen 3/3/21  Microalbuminuria: yes - has CKD, not currently on Ace or ARB, sees Cardiology , Nephrology.  Is on Jardiance, understands to hold if possible dehydration.    HTN: past - on coreg  On statin: yes  On ASA:yes  Depression:yes  - sees psych  ED:did not inquire    Frandy's PMH reviewed with him.      Current Outpatient Medications   Medication     Acetaminophen (TYLENOL) 325 MG CAPS     ARIPiprazole (ABILIFY) 5 MG tablet     Artificial Tear Solution (SM ARTIFICIAL TEARS) SOLN     aspirin 81 MG EC tablet     BD VIKTORIA U/F 32G X 4 MM insulin pen needle     carvedilol (COREG) 3.125 MG tablet     ciclopirox (LOPROX) 0.77 % cream     Continuous Blood Gluc  (FREESTYLE CLAUDIA 14 DAY READER) CECILIO     Continuous Blood Gluc Sensor (FREESTYLE CLAUDIA 14 DAY SENSOR) MISC     empagliflozin (JARDIANCE) 25 MG TABS tablet     glucose (BD GLUCOSE) 4 g chewable tablet     insulin aspart (NOVOLOG PEN) 100 UNIT/ML pen     insulin degludec (TRESIBA FLEXTOUCH) 100 UNIT/ML pen     lamiVUDine (EPIVIR) 100 MG tablet     Multiple Vitamin (THERAVITE PO)     mycophenolate (GENERIC EQUIVALENT) 250 MG capsule     order for DME     pantoprazole (PROTONIX) 40 MG EC tablet     polyethylene glycol  (MIRALAX) 17 g packet     predniSONE (DELTASONE) 5 MG tablet     rosuvastatin (CRESTOR) 5 MG tablet     tamsulosin (FLOMAX) 0.4 MG capsule     vitamin B-12 (CYANOCOBALAMIN) 1000 MCG tablet     vitamin D3 (CHOLECALCIFEROL) 50 mcg (2000 units) tablet     gabapentin (NEURONTIN) 300 MG capsule     zolpidem (AMBIEN) 10 MG tablet     Current Facility-Administered Medications   Medication     aflibercept (EYLEA) injection prefilled syringe 2 mg     aflibercept (EYLEA) injection prefilled syringe 2 mg              Frandy's family history includes Asthma in his sister; Cancer in his father, maternal grandmother, and mother; Cerebrovascular Disease in his mother; Colon Cancer (age of onset: 60) in his father; Colorectal Cancer in his father, maternal grandmother, and paternal grandmother; Depression in his mother and sister; Diabetes in his mother; Glaucoma in his father; Macular Degeneration in his father; Pancreatic Cancer (age of onset: 60) in his father; Prostate Cancer in his father and maternal grandfather; Skin Cancer in his father; Substance Abuse in his maternal grandfather and maternal grandmother; Thyroid Disease in his mother and sister.    ROS:   Patient denies any fevers, chills or sweats as well as any changes in or problems with vision, pain or problems with dentition, new or different headaches.  Patient denies symptoms of hypo and hyperglycemia except as above.   Patients denies marked fatigue, cough, shortness of breath, chest pain or pressure.  There has been no pain with or other changes in urination or  itching or pain in genital areas.  Patient denies any noted swelling in feet, ankles or otherwise, loss of sensation or pain  in feet or other areas.    Patient also denies current difficulties with depressed mood, anhedonia or worrying too much.        Exam:    PHONE VISIT        Wt Readings from Last 10 Encounters:   10/14/20 78 kg (171 lb 14.4 oz)   09/30/20 78.8 kg (173 lb 12.8 oz)   09/25/20 (P) 79  "kg (174 lb 1.6 oz)   09/16/20 77.6 kg (171 lb)   08/19/20 76.9 kg (169 lb 8 oz)   08/12/20 76.8 kg (169 lb 4.8 oz)   07/29/20 75.6 kg (166 lb 9.6 oz)   07/28/20 75.8 kg (167 lb)   07/15/20 73.4 kg (161 lb 12.8 oz)   07/01/20 70.3 kg (155 lb)         Frandy is alerted and oriented.  Mood is \"good,\" affect is congruent.  Thoughtful form and content are fluid and coherent.  No signs of distress are appreciated.    GENERAL: Healthy, alert and no distress.  Clean shaven.    EYES: Eyes grossly normal to inspection.  No discharge or erythema, or obvious scleral/conjunctival abnormalities.  RESP: No audible wheeze, cough, or visible cyanosis.  No visible retractions or increased work of breathing.    SKIN: Visible skin clear. No significant rash, abnormal pigmentation or lesions.  NEURO: Cranial nerves grossly intact.  Mentation and speech appropriate for age.  PSYCH: Mentation appears normal, affect normal/bright, judgement and insight intact, normal speech and appearance well-groomed.      Data:      Most recent:  Lab Results   Component Value Date    CR 1.95 10/14/2020     Lab Results   Component Value Date     10/14/2020      Anion Gap 3 - 14 mmol/L 4     Glucose 70 - 99 mg/dL 147High      Urea Nitrogen 7 - 30 mg/dL 39High      Creatinine 0.66 - 1.25 mg/dL 1.62High      GFR Estimate >60 mL/min/1.73_m2 46Low       On 1/20/21.      Lab Results   Component Value Date    A1C 6.0 (H) 12/30/2020    A1C 6.3 (H) 06/13/2020    A1C 6.6 (H) 08/08/2018    A1C 6.5 (H) 06/09/2017    A1C 7.8 (H) 10/25/2016    HEMOGLOBINA1 4.8 03/04/2020    HEMOGLOBINA1 5.1 12/02/2019    HEMOGLOBINA1 5.4 08/14/2019    HEMOGLOBINA1 6.1 (A) 02/11/2019    HEMOGLOBINA1 8.1 (A) 04/11/2018     Lab Results   Component Value Date    MICROL 196 06/29/2020       No results found for: CPEPT, GADAB, ISCAB  Cholesterol   Date Value Ref Range Status   09/25/2020 146 <200 mg/dL Final   07/29/2020 192 <200 mg/dL Final     HDL Cholesterol   Date Value Ref Range " Status   09/25/2020 39 (L) >39 mg/dL Final   07/29/2020 39 (L) >39 mg/dL Final     LDL Cholesterol Calculated   Date Value Ref Range Status   09/25/2020 61 <100 mg/dL Final     Comment:     Desirable:       <100 mg/dl   07/29/2020 117 (H) <100 mg/dL Final     Comment:     Above desirable:  100-129 mg/dl  Borderline High:  130-159 mg/dL  High:             160-189 mg/dL  Very high:       >189 mg/dl       Triglycerides   Date Value Ref Range Status   09/25/2020 228 (H) <150 mg/dL Final     Comment:     Borderline high:  150-199 mg/dl  High:             200-499 mg/dl  Very high:       >499 mg/dl     07/29/2020 181 (H) <150 mg/dL Final     Comment:     Borderline high:  150-199 mg/dl  High:             200-499 mg/dl  Very high:       >499 mg/dl       No results found for: CHOLHDLRATIO      GFR Estimate   Date Value Ref Range Status   06/28/2021 46 (L) >60 mL/min/[1.73_m2] Final     Comment:     Non  GFR Calc  Starting 12/18/2018, serum creatinine based estimated GFR (eGFR) will be   calculated using the Chronic Kidney Disease Epidemiology Collaboration   (CKD-EPI) equation.     06/14/2021 49 (L) >60 mL/min/[1.73_m2] Final     Comment:     Non  GFR Calc  Starting 12/18/2018, serum creatinine based estimated GFR (eGFR) will be   calculated using the Chronic Kidney Disease Epidemiology Collaboration   (CKD-EPI) equation.     06/04/2021 46 (L) >60 mL/min/[1.73_m2] Final     Comment:     Non  GFR Calc  Starting 12/18/2018, serum creatinine based estimated GFR (eGFR) will be   calculated using the Chronic Kidney Disease Epidemiology Collaboration   (CKD-EPI) equation.         TSH   Date Value Ref Range Status   01/28/2021 0.91 0.40 - 4.00 mU/L Final          CBC BMP Hep panel from 2/26/21 stable, reassuring.    Awaiting CT 3/26          Assessment/Plan:    Frandy is a 55 year old male with  has a past medical history of Anemia (2013), Arthritis, BPH (benign prostatic  hyperplasia), CAD (coronary artery disease) (2019), Cholelithiasis, Conductive hearing loss (2017), Depressive disorder (), Gastroesophageal reflux disease (2014), HCC (hepatocellular carcinoma) (H) (2019), History of diabetic retinopathy (2018), HTN (hypertension), HTN (hypertension) (2019), Hyperlipidemia, Liver cirrhosis secondary to ESTRADA (H), Liver transplanted (H) (2019), Portal vein thrombosis (2019), and Type II diabetes mellitus (H). He also has no past medical history of Asymptomatic human immunodeficiency virus (HIV) infection status (H), Cerebral infarction (H), Congestive heart failure (H), COPD (chronic obstructive pulmonary disease) (H), History of blood transfusion, Infectious mononucleosis, PONV (postoperative nausea and vomiting), Thyroid disease, Tuberculosis, or Uncomplicated asthma.      1. DM2, blood sugar at goal.    Recommend continue:   -  carbohydrate coverage to 1 unit : 15 g of carbohydrate.  Plans to do more consistently. Communicating with pharmacy to problem shoot lapses in Benjamin coverage as he gets out of Novolog sliding scale insulin habit end of each month and hard to resume.  Rewriting rx for 9 sensors q 3 months.     -  Correction Novolounit Novolo mg /dl >150     - Empagliflozin 10 mg daily.  Exercise as able early in day.  Reccomend a mix of weight bearing and aerobic exercise to meet ADA recommendations - as able with very low Hgb.      Consider nutrition heart healthy diet, weight maintenance diet.    RTC 6 months      2. DM Complications-     Lifestyle modification focusing on application of a Mediterranean diet or Dietary Approaches to Stop Hypertension (DASH) dietary pattern; reduction of saturated fat and trans fat; increase of dietary n-3 fatty acids, viscous fiber, and plant stanols/sterols intake; and increased physical activity recommended to improve the lipid profile and reduce the risk of developing  atherosclerotic cardiovascular disease.     Retinopathy:  Yes.  Seeing retina specialist.    Nephropathy:  No current elevated BP.  Creatinine stable. Consider ACe I / ARB. Continue Jardiance.   Neuropathy: No.    Feet: OK, no ulcers or lesions.   Lipids:   Patient taking a statin.    3. Night sweats:  Resolved.    4. Anemia: Appointment with heme P per patient.  Also sees Dr Ireland later this month.     30 minutes in preparation for visit reviewing chart, labs and documentation, visiting with patient gathering history and in exam, education and counseling, as well as coordination of care, further chart review and documentation following visit on this date of service and as alluded to documented above.         It is my privilege to be involved in the care of the above patient.     Tish Toscano PA-C, MPAS  [facility]  Diabetes, Endocrinology, and Metabolism  [phone] Appointments/Nurse  [phone] pager  [phone] nurse line    This note was completed in part using Dragon voice recognition, and may contain word and grammatical errors.

## 2021-06-29 ENCOUNTER — TELEPHONE (OUTPATIENT)
Dept: PHARMACY | Facility: CLINIC | Age: 56
End: 2021-06-29

## 2021-06-29 ENCOUNTER — VIRTUAL VISIT (OUTPATIENT)
Dept: ENDOCRINOLOGY | Facility: CLINIC | Age: 56
End: 2021-06-29
Payer: MEDICARE

## 2021-06-29 DIAGNOSIS — E11.49 TYPE II OR UNSPECIFIED TYPE DIABETES MELLITUS WITH NEUROLOGICAL MANIFESTATIONS, NOT STATED AS UNCONTROLLED(250.60) (H): ICD-10-CM

## 2021-06-29 DIAGNOSIS — Z59.71 INSURANCE COVERAGE PROBLEMS: Primary | ICD-10-CM

## 2021-06-29 PROCEDURE — 99214 OFFICE O/P EST MOD 30 MIN: CPT | Mod: 95 | Performed by: PHYSICIAN ASSISTANT

## 2021-06-29 NOTE — TELEPHONE ENCOUNTER
Anemia Management Note  SUBJECTIVE/OBJECTIVE:  Referred by Dr. Eloy Rao on 2021  Primary Diagnosis: Anemia in Chronic Kidney Disease (N18.3, D63.1)   3b  Secondary Diagnosis:  Chronic Kidney Disease, Stage 3 (N18.3)  3b  Liver Tx: 2019  Hgb goal range:  9-10  Epo/Darbo: Aranesp 40mcg every 14 days for Hgb <10.  In Clinic.   Iron regimen:  NA.  Iron levels stable  Labs : 2022  Recent IVELISSE use, transfusion, IV iron: Aranesp   RX/TX plans :  6/10/2022     No history of stroke, MI and blood clots.  History of hepatocellular carcinoma - s/p TACE 2019 and liver transplant 2019.     Contact:            Ok to leave message regarding scheduling, medical, and billing per consent to communicate dated 10/2/19                              OK to speak with Davi Saunders (friend/POA) regarding scheduling, medical, and billing per consent to communicate dated 10/2/19    Anemia Latest Ref Rng & Units 2021 3/26/2021 2021 2021 2021 2021 2021   IVELISSE Dose - - - - - - 40mcg 40mcg   Hemoglobin 13.3 - 17.7 g/dL 13.9 13.0(L) 12.1(L) 11.6(L) 9.0(L) 8.8(L) 7.3(L)   TSAT 15 - 46 % - - - - 59(H) - 50(H)   Ferritin 26 - 388 ng/mL - - - - 399(H) - 495(H)   PRBCs - - - - - - - -     BP Readings from Last 3 Encounters:   21 115/68   21 124/74   21 129/84     Wt Readings from Last 2 Encounters:   21 173 lb 11.2 oz (78.8 kg)   20 173 lb (78.5 kg)           ASSESSMENT:  Hgb:Not at goal/Known  TSat: at goal >30% Ferritin: At goal (>100ng/mL)    PLAN:  Dose with aranesp and RTC for hgb then aranesp if needed in 2 week(s)    Orders needed to be renewed (for next follow-up date) in EPIC: None    Iron labs due:  12 weeks    Plan discussed with:  No call, chart review  Plan provided by:  adri    NEXT FOLLOW-UP DATE:  21  11am appt    Jie Blum RN   Anemia Services  [address] [place] [address]   [email or web address]    Office : [phone]  Fax: [phone]

## 2021-06-29 NOTE — LETTER
6/29/2021       RE: Frandy Workman  [address] [address]     Dear Colleague,    Thank you for referring your patient, Frandy Workman, to the [facility] at [facility]. Please see a copy of my visit note below.    Outcome for 06/28/21 10:57 AM :Patient is sharing data via clinic device website and patient has been instructed to update data on website. Find login information by using .[employer]  Benjamin    Frandy Workman  is being evaluated via a billable video visit.      How would you like to obtain your AVS? Mail a copy  For the video visit, send the invitation by: [employer]  Will anyone else be joining your video visit? No      Video visit      Start: 3:00  End: 3:25      Diabetes Virtual Consult Note  June 29, 2021        Frandy Workman is a 56 year old male with a past medical history including  has a past medical history of Anemia (2013), Arthritis, BPH (benign prostatic hyperplasia), CAD (coronary artery disease) (4/1/2019), Cholelithiasis, Conductive hearing loss (08/16/2017), Depressive disorder (1986), Gastroesophageal reflux disease (12/01/2014), HCC (hepatocellular carcinoma) (H) (1/22/2019), History of diabetic retinopathy (07/2018), HTN (hypertension), HTN (hypertension) (11/20/2019), Hyperlipidemia, Liver cirrhosis secondary to ESTRADA (H), Liver transplanted (H) (11/11/2019), Portal vein thrombosis (4/11/2019), and Type II diabetes mellitus (H). He also has no past medical history of Asymptomatic human immunodeficiency virus (HIV) infection status (H), Cerebral infarction (H), Congestive heart failure (H), COPD (chronic obstructive pulmonary disease) (H), History of blood transfusion, Infectious mononucleosis, PONV (postoperative nausea and vomiting), Thyroid disease, Tuberculosis, or Uncomplicated asthma. He also struggles with BPAD.      January 2021: advised to increase his Tresiba to 28 units  daily with a goal of fasting blood sugar 90-1 40 as advised to increase his Jardiance from 10 mg to 20 mg daily at the time of renewal of this prescription.  He also continued to complain of sweating night sweats and was awaiting a CT scheduled for March 26 as well as seeing psychiatry in regards to this.  We also attempted to adjust his camilo prescription as he was running out at the end of every month.  His average blood glucose at that time was 180.    Last visit, March 2021: Blood glucose was slightly above goal and he was struggling to keep NovoLog and camilo consistently available. We communicated with pharmacy liaison in hopes of adjusting this. His self-management goal was to more consistently give carbohydrate coverage 1 unit per 15 g ahead of meals. He also planned to continue giving correction of NovoLog 1 unit per 50 greater than 150 prior to meals empagliflozin 10 mg daily, Tresiba 28 units /day and work on getting some physical activity in really each day as well as adding in some resistance training.    Miller was also struggling with recurrent night sweats and had been for several months. He had imaging and follow-up scheduled and pending with primary care oncology and psychiatry.    Interim:    Reviewed notes - has developed profound anemia.  Efra Banuelos and Maira following.      Today:    Frustrated that runs out of CG<S 2-3 days every month and insurance will not give in time for need, has to do fingerstick and wonder  But as things steady these days causes less anxiety than previously, though still a good day as not feeling well otherwise so often worrying might also be low.      Hgb is dropping and is back on shots.  No cause found yet, but should be seen in anemia clinic.  He is very tired, sleeping more, feeling some palpitations, but mostly just sleeping more.      Night sweats have resolved.  Has had some lows at night, but nothing big.      Per CGM S his blood glucose average has improved  significantly from 180 at last visit now down to 6.6. This has been achieved without any hypoglycemia blood sugars in range 86% of the time and high 14%.     He is pretty religiously giving his Novolog with meals and now rarely needs an extra unit for BG >180.  Impressed how well it works when he does this consistently.  Any BG >180 is related to rare indiscretion which he is okay with.    Dm Regimen:   - Tresiba 28 units /day.   -  carbohydrate coverage to 1 unit : 15 g of carbohydrate.   -  Correction Novolounit Novolo mg /dl >180     - Empagliflozin 10 mg daily.      We reviewed glucometer, pump and CGMS data together.  It revealed:    Blood glucose is decreased significantly from last visit were average was 180.        Per CGM S his blood glucose average has improved significantly from 180 at last visit now down to 6.6. This has been achieved without any hypoglycemia blood sugars in range 86% of the time and high 14%.     History of Diabetes monitoring and complications/ prevention:  CAD: yes 2019  Last eye exam results:Treated for NPDMR - last seen 3/3/21  Microalbuminuria: yes - has CKD, not currently on Ace or ARB, sees Cardiology , Nephrology.  Is on Jardiance, understands to hold if possible dehydration.    HTN: past - on coreg  On statin: yes  On ASA:yes  Depression:yes  - sees psych  ED:did not inquire    Frandy's PMH reviewed with him.      Current Outpatient Medications   Medication     Acetaminophen (TYLENOL) 325 MG CAPS     ARIPiprazole (ABILIFY) 5 MG tablet     Artificial Tear Solution (SM ARTIFICIAL TEARS) SOLN     aspirin 81 MG EC tablet     BD VIKTORIA U/F 32G X 4 MM insulin pen needle     carvedilol (COREG) 3.125 MG tablet     ciclopirox (LOPROX) 0.77 % cream     Continuous Blood Gluc  (FREESTYLE CLAUDIA 14 DAY READER) CECILIO     Continuous Blood Gluc Sensor (FREESTYLE CLAUDIA 14 DAY SENSOR) MISC     empagliflozin (JARDIANCE) 25 MG TABS tablet     glucose (BD GLUCOSE) 4 g chewable  tablet     insulin aspart (NOVOLOG PEN) 100 UNIT/ML pen     insulin degludec (TRESIBA FLEXTOUCH) 100 UNIT/ML pen     lamiVUDine (EPIVIR) 100 MG tablet     Multiple Vitamin (THERAVITE PO)     mycophenolate (GENERIC EQUIVALENT) 250 MG capsule     order for DME     pantoprazole (PROTONIX) 40 MG EC tablet     polyethylene glycol (MIRALAX) 17 g packet     predniSONE (DELTASONE) 5 MG tablet     rosuvastatin (CRESTOR) 5 MG tablet     tamsulosin (FLOMAX) 0.4 MG capsule     vitamin B-12 (CYANOCOBALAMIN) 1000 MCG tablet     vitamin D3 (CHOLECALCIFEROL) 50 mcg (2000 units) tablet     gabapentin (NEURONTIN) 300 MG capsule     zolpidem (AMBIEN) 10 MG tablet     Current Facility-Administered Medications   Medication     aflibercept (EYLEA) injection prefilled syringe 2 mg     aflibercept (EYLEA) injection prefilled syringe 2 mg              Frandy's family history includes Asthma in his sister; Cancer in his father, maternal grandmother, and mother; Cerebrovascular Disease in his mother; Colon Cancer (age of onset: 60) in his father; Colorectal Cancer in his father, maternal grandmother, and paternal grandmother; Depression in his mother and sister; Diabetes in his mother; Glaucoma in his father; Macular Degeneration in his father; Pancreatic Cancer (age of onset: 60) in his father; Prostate Cancer in his father and maternal grandfather; Skin Cancer in his father; Substance Abuse in his maternal grandfather and maternal grandmother; Thyroid Disease in his mother and sister.    ROS:   Patient denies any fevers, chills or sweats as well as any changes in or problems with vision, pain or problems with dentition, new or different headaches.  Patient denies symptoms of hypo and hyperglycemia except as above.   Patients denies marked fatigue, cough, shortness of breath, chest pain or pressure.  There has been no pain with or other changes in urination or  itching or pain in genital areas.  Patient denies any noted swelling in feet,  "ankles or otherwise, loss of sensation or pain  in feet or other areas.    Patient also denies current difficulties with depressed mood, anhedonia or worrying too much.        Exam:    PHONE VISIT        Wt Readings from Last 10 Encounters:   10/14/20 78 kg (171 lb 14.4 oz)   09/30/20 78.8 kg (173 lb 12.8 oz)   09/25/20 (P) 79 kg (174 lb 1.6 oz)   09/16/20 77.6 kg (171 lb)   08/19/20 76.9 kg (169 lb 8 oz)   08/12/20 76.8 kg (169 lb 4.8 oz)   07/29/20 75.6 kg (166 lb 9.6 oz)   07/28/20 75.8 kg (167 lb)   07/15/20 73.4 kg (161 lb 12.8 oz)   07/01/20 70.3 kg (155 lb)         Frandy is alerted and oriented.  Mood is \"good,\" affect is congruent.  Thoughtful form and content are fluid and coherent.  No signs of distress are appreciated.    GENERAL: Healthy, alert and no distress.  Clean shaven.    EYES: Eyes grossly normal to inspection.  No discharge or erythema, or obvious scleral/conjunctival abnormalities.  RESP: No audible wheeze, cough, or visible cyanosis.  No visible retractions or increased work of breathing.    SKIN: Visible skin clear. No significant rash, abnormal pigmentation or lesions.  NEURO: Cranial nerves grossly intact.  Mentation and speech appropriate for age.  PSYCH: Mentation appears normal, affect normal/bright, judgement and insight intact, normal speech and appearance well-groomed.      Data:      Most recent:  Lab Results   Component Value Date    CR 1.95 10/14/2020     Lab Results   Component Value Date     10/14/2020      Anion Gap 3 - 14 mmol/L 4     Glucose 70 - 99 mg/dL 147High      Urea Nitrogen 7 - 30 mg/dL 39High      Creatinine 0.66 - 1.25 mg/dL 1.62High      GFR Estimate >60 mL/min/1.73_m2 46Low       On 1/20/21.      Lab Results   Component Value Date    A1C 6.0 (H) 12/30/2020    A1C 6.3 (H) 06/13/2020    A1C 6.6 (H) 08/08/2018    A1C 6.5 (H) 06/09/2017    A1C 7.8 (H) 10/25/2016    HEMOGLOBINA1 4.8 03/04/2020    HEMOGLOBINA1 5.1 12/02/2019    HEMOGLOBINA1 5.4 08/14/2019    " HEMOGLOBINA1 6.1 (A) 02/11/2019    HEMOGLOBINA1 8.1 (A) 04/11/2018     Lab Results   Component Value Date    MICROL 196 06/29/2020       No results found for: CPEPT, GADAB, ISCAB  Cholesterol   Date Value Ref Range Status   09/25/2020 146 <200 mg/dL Final   07/29/2020 192 <200 mg/dL Final     HDL Cholesterol   Date Value Ref Range Status   09/25/2020 39 (L) >39 mg/dL Final   07/29/2020 39 (L) >39 mg/dL Final     LDL Cholesterol Calculated   Date Value Ref Range Status   09/25/2020 61 <100 mg/dL Final     Comment:     Desirable:       <100 mg/dl   07/29/2020 117 (H) <100 mg/dL Final     Comment:     Above desirable:  100-129 mg/dl  Borderline High:  130-159 mg/dL  High:             160-189 mg/dL  Very high:       >189 mg/dl       Triglycerides   Date Value Ref Range Status   09/25/2020 228 (H) <150 mg/dL Final     Comment:     Borderline high:  150-199 mg/dl  High:             200-499 mg/dl  Very high:       >499 mg/dl     07/29/2020 181 (H) <150 mg/dL Final     Comment:     Borderline high:  150-199 mg/dl  High:             200-499 mg/dl  Very high:       >499 mg/dl       No results found for: CHOLHDLRATIO      GFR Estimate   Date Value Ref Range Status   06/28/2021 46 (L) >60 mL/min/[1.73_m2] Final     Comment:     Non  GFR Calc  Starting 12/18/2018, serum creatinine based estimated GFR (eGFR) will be   calculated using the Chronic Kidney Disease Epidemiology Collaboration   (CKD-EPI) equation.     06/14/2021 49 (L) >60 mL/min/[1.73_m2] Final     Comment:     Non  GFR Calc  Starting 12/18/2018, serum creatinine based estimated GFR (eGFR) will be   calculated using the Chronic Kidney Disease Epidemiology Collaboration   (CKD-EPI) equation.     06/04/2021 46 (L) >60 mL/min/[1.73_m2] Final     Comment:     Non  GFR Calc  Starting 12/18/2018, serum creatinine based estimated GFR (eGFR) will be   calculated using the Chronic Kidney Disease Epidemiology Collaboration    (CKD-EPI) equation.         TSH   Date Value Ref Range Status   2021 0.91 0.40 - 4.00 mU/L Final          CBC BMP Hep panel from 21 stable, reassuring.    Awaiting CT 3/26          Assessment/Plan:    Frandy is a 55 year old male with  has a past medical history of Anemia (), Arthritis, BPH (benign prostatic hyperplasia), CAD (coronary artery disease) (2019), Cholelithiasis, Conductive hearing loss (2017), Depressive disorder (), Gastroesophageal reflux disease (2014), HCC (hepatocellular carcinoma) (H) (2019), History of diabetic retinopathy (2018), HTN (hypertension), HTN (hypertension) (2019), Hyperlipidemia, Liver cirrhosis secondary to ESTRADA (H), Liver transplanted (H) (2019), Portal vein thrombosis (2019), and Type II diabetes mellitus (H). He also has no past medical history of Asymptomatic human immunodeficiency virus (HIV) infection status (H), Cerebral infarction (H), Congestive heart failure (H), COPD (chronic obstructive pulmonary disease) (H), History of blood transfusion, Infectious mononucleosis, PONV (postoperative nausea and vomiting), Thyroid disease, Tuberculosis, or Uncomplicated asthma.      1. DM2, blood sugar at goal.    Recommend continue:   -  carbohydrate coverage to 1 unit : 15 g of carbohydrate.  Plans to do more consistently. Communicating with pharmacy to problem shoot lapses in Benjamin coverage as he gets out of Novolog sliding scale insulin habit end of each month and hard to resume.  Rewriting rx for 9 sensors q 3 months.     -  Correction Novolounit Novolo mg /dl >150     - Empagliflozin 10 mg daily.  Exercise as able early in day.  Reccomend a mix of weight bearing and aerobic exercise to meet ADA recommendations - as able with very low Hgb.      Consider nutrition heart healthy diet, weight maintenance diet.    RTC 6 months      2. DM Complications-     Lifestyle modification focusing on application of a  Mediterranean diet or Dietary Approaches to Stop Hypertension (DASH) dietary pattern; reduction of saturated fat and trans fat; increase of dietary n-3 fatty acids, viscous fiber, and plant stanols/sterols intake; and increased physical activity recommended to improve the lipid profile and reduce the risk of developing atherosclerotic cardiovascular disease.     Retinopathy:  Yes.  Seeing retina specialist.    Nephropathy:  No current elevated BP.  Creatinine stable. Consider ACe I / ARB. Continue Jardiance.   Neuropathy: No.    Feet: OK, no ulcers or lesions.   Lipids:   Patient taking a statin.    3. Night sweats:  Resolved.    4. Anemia: Appointment with heme P per patient.  Also sees Dr Ireland later this month.     30 minutes in preparation for visit reviewing chart, labs and documentation, visiting with patient gathering history and in exam, education and counseling, as well as coordination of care, further chart review and documentation following visit on this date of service and as alluded to documented above.         It is my privilege to be involved in the care of the above patient.     Tish Toscano PA-C, MPAS  [facility]  Diabetes, Endocrinology, and Metabolism  [phone] Appointments/Nurse  [phone] pager  [phone] nurse line    This note was completed in part using Dragon voice recognition, and may contain word and grammatical errors.

## 2021-07-01 DIAGNOSIS — E11.3293 TYPE 2 DIABETES MELLITUS WITH MILD NONPROLIFERATIVE RETINOPATHY OF BOTH EYES WITHOUT MACULAR EDEMA, UNSPECIFIED WHETHER LONG TERM INSULIN USE (H): ICD-10-CM

## 2021-07-01 RX ORDER — FLASH GLUCOSE SENSOR
KIT MISCELLANEOUS
Qty: 9 EACH | Refills: 3 | Status: SHIPPED | OUTPATIENT
Start: 2021-07-01 | End: 2021-12-07

## 2021-07-01 NOTE — TELEPHONE ENCOUNTER
REQUESTING A NEW MEDICARE COMPLIANT RX FROM [facility] FOR FREESTYLE CLAUDIA 14 DAY SENSORS FOR A MEDICARE RENEWAL. RX MUST BE WRITTEN BY [facility] ON OR AFTER VISIT DATE 06/29/21    Please call and speak to one of our Durable Medical Equipment Team members if you have any questions- [phone]

## 2021-07-12 ENCOUNTER — APPOINTMENT (OUTPATIENT)
Dept: GENERAL RADIOLOGY | Facility: CLINIC | Age: 56
DRG: 234 | End: 2021-07-12
Payer: MEDICARE

## 2021-07-12 ENCOUNTER — LAB (OUTPATIENT)
Dept: LAB | Facility: CLINIC | Age: 56
End: 2021-07-12
Attending: INTERNAL MEDICINE
Payer: MEDICARE

## 2021-07-12 ENCOUNTER — TELEPHONE (OUTPATIENT)
Dept: NEPHROLOGY | Facility: CLINIC | Age: 56
End: 2021-07-12

## 2021-07-12 ENCOUNTER — TELEPHONE (OUTPATIENT)
Dept: TRANSPLANT | Facility: CLINIC | Age: 56
End: 2021-07-12

## 2021-07-12 ENCOUNTER — HOSPITAL ENCOUNTER (INPATIENT)
Facility: CLINIC | Age: 56
LOS: 10 days | Discharge: HOME | DRG: 234 | End: 2021-07-22
Attending: EMERGENCY MEDICINE | Admitting: INTERNAL MEDICINE
Payer: MEDICARE

## 2021-07-12 ENCOUNTER — TELEPHONE (OUTPATIENT)
Dept: PHARMACY | Facility: CLINIC | Age: 56
End: 2021-07-12

## 2021-07-12 ENCOUNTER — ALLIED HEALTH/NURSE VISIT (OUTPATIENT)
Dept: TRANSPLANT | Facility: CLINIC | Age: 56
DRG: 234 | End: 2021-07-12
Attending: INTERNAL MEDICINE
Payer: MEDICARE

## 2021-07-12 VITALS — SYSTOLIC BLOOD PRESSURE: 109 MMHG | DIASTOLIC BLOOD PRESSURE: 67 MMHG

## 2021-07-12 DIAGNOSIS — Z20.822 CONTACT WITH AND (SUSPECTED) EXPOSURE TO COVID-19: ICD-10-CM

## 2021-07-12 DIAGNOSIS — I21.3 ST ELEVATION MI (STEMI) (H): ICD-10-CM

## 2021-07-12 DIAGNOSIS — D63.1 ANEMIA OF CHRONIC RENAL FAILURE, STAGE 3B (H): ICD-10-CM

## 2021-07-12 DIAGNOSIS — N18.32 ANEMIA OF CHRONIC RENAL FAILURE, STAGE 3B (H): ICD-10-CM

## 2021-07-12 DIAGNOSIS — Z94.4 LIVER REPLACED BY TRANSPLANT (H): ICD-10-CM

## 2021-07-12 DIAGNOSIS — I25.10 CAD (CORONARY ARTERY DISEASE): Primary | ICD-10-CM

## 2021-07-12 DIAGNOSIS — Z95.1 S/P CABG (CORONARY ARTERY BYPASS GRAFT): ICD-10-CM

## 2021-07-12 DIAGNOSIS — Z94.4 STATUS POST LIVER TRANSPLANTATION (H): ICD-10-CM

## 2021-07-12 DIAGNOSIS — N18.32 STAGE 3B CHRONIC KIDNEY DISEASE (H): ICD-10-CM

## 2021-07-12 DIAGNOSIS — N18.32 STAGE 3B CHRONIC KIDNEY DISEASE (H): Primary | ICD-10-CM

## 2021-07-12 DIAGNOSIS — I21.3 ST ELEVATION MYOCARDIAL INFARCTION (STEMI), UNSPECIFIED ARTERY (H): ICD-10-CM

## 2021-07-12 DIAGNOSIS — D64.9 ANEMIA, UNSPECIFIED TYPE: ICD-10-CM

## 2021-07-12 PROBLEM — I21.4 NSTEMI (NON-ST ELEVATED MYOCARDIAL INFARCTION) (H): Status: ACTIVE | Noted: 2021-07-12

## 2021-07-12 LAB
ABO/RH(D): NORMAL
ALBUMIN SERPL-MCNC: 4.1 G/DL (ref 3.4–5)
ALP SERPL-CCNC: 100 U/L (ref 40–150)
ALT SERPL W P-5'-P-CCNC: 20 U/L (ref 0–70)
ANION GAP SERPL CALCULATED.3IONS-SCNC: 6 MMOL/L (ref 3–14)
ANTIBODY SCREEN: NEGATIVE
AST SERPL W P-5'-P-CCNC: 8 U/L (ref 0–45)
BILIRUB DIRECT SERPL-MCNC: 0.2 MG/DL (ref 0–0.2)
BILIRUB SERPL-MCNC: 0.7 MG/DL (ref 0.2–1.3)
BLD PROD TYP BPU: NORMAL
BLD PROD TYP BPU: NORMAL
BLOOD COMPONENT TYPE: NORMAL
BLOOD COMPONENT TYPE: NORMAL
BUN SERPL-MCNC: 33 MG/DL (ref 7–30)
CALCIUM SERPL-MCNC: 9.4 MG/DL (ref 8.5–10.1)
CHLORIDE BLD-SCNC: 107 MMOL/L (ref 94–109)
CO2 SERPL-SCNC: 25 MMOL/L (ref 20–32)
CODING SYSTEM: NORMAL
CODING SYSTEM: NORMAL
CREAT SERPL-MCNC: 1.55 MG/DL (ref 0.66–1.25)
CROSSMATCH: NORMAL
CROSSMATCH: NORMAL
ERYTHROCYTE [DISTWIDTH] IN BLOOD BY AUTOMATED COUNT: 15.2 % (ref 10–15)
GFR SERPL CREATININE-BSD FRML MDRD: 49 ML/MIN/1.73M2
GLUCOSE BLD-MCNC: 142 MG/DL (ref 70–99)
GLUCOSE BLDC GLUCOMTR-MCNC: 176 MG/DL (ref 70–99)
HCT VFR BLD AUTO: 20.4 % (ref 40–53)
HGB BLD-MCNC: 6.3 G/DL (ref 13.3–17.7)
HOLD SPECIMEN: NORMAL
HOLD SPECIMEN: NORMAL
INR PPP: 0.96 (ref 0.85–1.15)
ISSUE DATE AND TIME: NORMAL
ISSUE DATE AND TIME: NORMAL
MAGNESIUM SERPL-MCNC: 2.2 MG/DL (ref 1.6–2.3)
MCH RBC QN AUTO: 30 PG (ref 26.5–33)
MCHC RBC AUTO-ENTMCNC: 30.9 G/DL (ref 31.5–36.5)
MCV RBC AUTO: 97 FL (ref 78–100)
PLATELET # BLD AUTO: 126 10E3/UL (ref 150–450)
POTASSIUM BLD-SCNC: 4.6 MMOL/L (ref 3.4–5.3)
PROT SERPL-MCNC: 7.4 G/DL (ref 6.8–8.8)
RBC # BLD AUTO: 2.1 10E6/UL (ref 4.4–5.9)
SODIUM SERPL-SCNC: 138 MMOL/L (ref 133–144)
SPECIMEN EXPIRATION DATE: NORMAL
TROPONIN I SERPL-MCNC: 0.12 UG/L (ref 0–0.04)
TROPONIN T BLD-MCNC: 0.07 UG/L
UNIT ABO/RH: NORMAL
UNIT ABO/RH: NORMAL
UNIT NUMBER: NORMAL
UNIT NUMBER: NORMAL
UNIT STATUS: NORMAL
UNIT STATUS: NORMAL
UNIT TYPE ISBT: 5100
UNIT TYPE ISBT: 5100
WBC # BLD AUTO: 5.9 10E3/UL (ref 4–11)

## 2021-07-12 PROCEDURE — 33967 INSERT I-AORT PERCUT DEVICE: CPT | Performed by: INTERNAL MEDICINE

## 2021-07-12 PROCEDURE — 93005 ELECTROCARDIOGRAM TRACING: CPT | Performed by: EMERGENCY MEDICINE

## 2021-07-12 PROCEDURE — 93010 ELECTROCARDIOGRAM REPORT: CPT | Mod: 76 | Performed by: EMERGENCY MEDICINE

## 2021-07-12 PROCEDURE — 36592 COLLECT BLOOD FROM PICC: CPT | Performed by: EMERGENCY MEDICINE

## 2021-07-12 PROCEDURE — 85610 PROTHROMBIN TIME: CPT | Performed by: EMERGENCY MEDICINE

## 2021-07-12 PROCEDURE — 99153 MOD SED SAME PHYS/QHP EA: CPT | Performed by: INTERNAL MEDICINE

## 2021-07-12 PROCEDURE — 71045 X-RAY EXAM CHEST 1 VIEW: CPT | Mod: 26 | Performed by: RADIOLOGY

## 2021-07-12 PROCEDURE — C1769 GUIDE WIRE: HCPCS | Performed by: INTERNAL MEDICINE

## 2021-07-12 PROCEDURE — 96372 THER/PROPH/DIAG INJ SC/IM: CPT | Performed by: INTERNAL MEDICINE

## 2021-07-12 PROCEDURE — 85018 HEMOGLOBIN: CPT | Performed by: INTERNAL MEDICINE

## 2021-07-12 PROCEDURE — B2111ZZ FLUOROSCOPY OF MULTIPLE CORONARY ARTERIES USING LOW OSMOLAR CONTRAST: ICD-10-PCS | Performed by: INTERNAL MEDICINE

## 2021-07-12 PROCEDURE — 250N000011 HC RX IP 250 OP 636: Performed by: INTERNAL MEDICINE

## 2021-07-12 PROCEDURE — 93454 CORONARY ARTERY ANGIO S&I: CPT | Performed by: INTERNAL MEDICINE

## 2021-07-12 PROCEDURE — 96366 THER/PROPH/DIAG IV INF ADDON: CPT | Performed by: EMERGENCY MEDICINE

## 2021-07-12 PROCEDURE — 5A02210 ASSISTANCE WITH CARDIAC OUTPUT USING BALLOON PUMP, CONTINUOUS: ICD-10-PCS | Performed by: INTERNAL MEDICINE

## 2021-07-12 PROCEDURE — 999N000077 HC STATISTIC INSERT IABP

## 2021-07-12 PROCEDURE — 36556 INSERT NON-TUNNEL CV CATH: CPT | Performed by: INTERNAL MEDICINE

## 2021-07-12 PROCEDURE — 250N000012 HC RX MED GY IP 250 OP 636 PS 637: Performed by: INTERNAL MEDICINE

## 2021-07-12 PROCEDURE — U0003 INFECTIOUS AGENT DETECTION BY NUCLEIC ACID (DNA OR RNA); SEVERE ACUTE RESPIRATORY SYNDROME CORONAVIRUS 2 (SARS-COV-2) (CORONAVIRUS DISEASE [COVID-19]), AMPLIFIED PROBE TECHNIQUE, MAKING USE OF HIGH THROUGHPUT TECHNOLOGIES AS DESCRIBED BY CMS-2020-01-R: HCPCS | Performed by: INTERNAL MEDICINE

## 2021-07-12 PROCEDURE — 99152 MOD SED SAME PHYS/QHP 5/>YRS: CPT | Mod: 59 | Performed by: INTERNAL MEDICINE

## 2021-07-12 PROCEDURE — 82962 GLUCOSE BLOOD TEST: CPT

## 2021-07-12 PROCEDURE — 99152 MOD SED SAME PHYS/QHP 5/>YRS: CPT | Performed by: INTERNAL MEDICINE

## 2021-07-12 PROCEDURE — 86923 COMPATIBILITY TEST ELECTRIC: CPT | Performed by: EMERGENCY MEDICINE

## 2021-07-12 PROCEDURE — 36415 COLL VENOUS BLD VENIPUNCTURE: CPT | Performed by: PATHOLOGY

## 2021-07-12 PROCEDURE — 99291 CRITICAL CARE FIRST HOUR: CPT | Mod: 25 | Performed by: EMERGENCY MEDICINE

## 2021-07-12 PROCEDURE — 83550 IRON BINDING TEST: CPT | Performed by: INTERNAL MEDICINE

## 2021-07-12 PROCEDURE — 200N000002 HC R&B ICU UMMC

## 2021-07-12 PROCEDURE — P9016 RBC LEUKOCYTES REDUCED: HCPCS | Performed by: EMERGENCY MEDICINE

## 2021-07-12 PROCEDURE — 85027 COMPLETE CBC AUTOMATED: CPT | Performed by: PATHOLOGY

## 2021-07-12 PROCEDURE — 999N000185 HC STATISTIC TRANSPORT TIME EA 15 MIN

## 2021-07-12 PROCEDURE — 82728 ASSAY OF FERRITIN: CPT | Performed by: INTERNAL MEDICINE

## 2021-07-12 PROCEDURE — 93454 CORONARY ARTERY ANGIO S&I: CPT | Mod: 26 | Performed by: INTERNAL MEDICINE

## 2021-07-12 PROCEDURE — 83540 ASSAY OF IRON: CPT | Performed by: INTERNAL MEDICINE

## 2021-07-12 PROCEDURE — C1887 CATHETER, GUIDING: HCPCS | Performed by: INTERNAL MEDICINE

## 2021-07-12 PROCEDURE — 999N000157 HC STATISTIC RCP TIME EA 10 MIN

## 2021-07-12 PROCEDURE — 250N000009 HC RX 250: Performed by: INTERNAL MEDICINE

## 2021-07-12 PROCEDURE — 93005 ELECTROCARDIOGRAM TRACING: CPT | Mod: 76 | Performed by: EMERGENCY MEDICINE

## 2021-07-12 PROCEDURE — 86900 BLOOD TYPING SEROLOGIC ABO: CPT | Performed by: EMERGENCY MEDICINE

## 2021-07-12 PROCEDURE — C1894 INTRO/SHEATH, NON-LASER: HCPCS | Performed by: INTERNAL MEDICINE

## 2021-07-12 PROCEDURE — 82962 GLUCOSE BLOOD TEST: CPT | Performed by: INTERNAL MEDICINE

## 2021-07-12 PROCEDURE — 82248 BILIRUBIN DIRECT: CPT | Performed by: PATHOLOGY

## 2021-07-12 PROCEDURE — 96365 THER/PROPH/DIAG IV INF INIT: CPT | Performed by: EMERGENCY MEDICINE

## 2021-07-12 PROCEDURE — 272N000057 HC CATH BALLOON IABP

## 2021-07-12 PROCEDURE — 83735 ASSAY OF MAGNESIUM: CPT | Performed by: PATHOLOGY

## 2021-07-12 PROCEDURE — 83615 LACTATE (LD) (LDH) ENZYME: CPT | Performed by: INTERNAL MEDICINE

## 2021-07-12 PROCEDURE — 93010 ELECTROCARDIOGRAM REPORT: CPT | Performed by: EMERGENCY MEDICINE

## 2021-07-12 PROCEDURE — 999N000075 HC STATISTIC IABP MONITORING

## 2021-07-12 PROCEDURE — 250N000013 HC RX MED GY IP 250 OP 250 PS 637: Performed by: EMERGENCY MEDICINE

## 2021-07-12 PROCEDURE — 272N000002 HC OR SUPPLY OTHER OPNP: Performed by: INTERNAL MEDICINE

## 2021-07-12 PROCEDURE — 272N000001 HC OR GENERAL SUPPLY STERILE: Performed by: INTERNAL MEDICINE

## 2021-07-12 PROCEDURE — 80053 COMPREHEN METABOLIC PANEL: CPT | Performed by: PATHOLOGY

## 2021-07-12 PROCEDURE — 02H633Z INSERTION OF INFUSION DEVICE INTO RIGHT ATRIUM, PERCUTANEOUS APPROACH: ICD-10-PCS | Performed by: INTERNAL MEDICINE

## 2021-07-12 PROCEDURE — C9803 HOPD COVID-19 SPEC COLLECT: HCPCS | Performed by: EMERGENCY MEDICINE

## 2021-07-12 PROCEDURE — 84484 ASSAY OF TROPONIN QUANT: CPT | Performed by: EMERGENCY MEDICINE

## 2021-07-12 PROCEDURE — 83010 ASSAY OF HAPTOGLOBIN QUANT: CPT | Performed by: INTERNAL MEDICINE

## 2021-07-12 PROCEDURE — 999N000065 XR CHEST PORT 1 VIEW

## 2021-07-12 RX ORDER — ONDANSETRON 4 MG/1
4 TABLET, ORALLY DISINTEGRATING ORAL EVERY 6 HOURS PRN
Status: DISCONTINUED | OUTPATIENT
Start: 2021-07-12 | End: 2021-07-14

## 2021-07-12 RX ORDER — MAGNESIUM HYDROXIDE/ALUMINUM HYDROXICE/SIMETHICONE 120; 1200; 1200 MG/30ML; MG/30ML; MG/30ML
30 SUSPENSION ORAL EVERY 4 HOURS PRN
Status: DISCONTINUED | OUTPATIENT
Start: 2021-07-12 | End: 2021-07-14

## 2021-07-12 RX ORDER — SODIUM CHLORIDE 9 MG/ML
INJECTION, SOLUTION INTRAVENOUS CONTINUOUS
Status: DISCONTINUED | OUTPATIENT
Start: 2021-07-12 | End: 2021-07-14

## 2021-07-12 RX ORDER — ROSUVASTATIN CALCIUM 5 MG/1
5 TABLET, COATED ORAL DAILY
Status: DISCONTINUED | OUTPATIENT
Start: 2021-07-13 | End: 2021-07-14

## 2021-07-12 RX ORDER — ALBUTEROL SULFATE 5 MG/ML
2.5 SOLUTION RESPIRATORY (INHALATION)
Status: CANCELLED | OUTPATIENT
Start: 2021-07-12

## 2021-07-12 RX ORDER — ACETAMINOPHEN 325 MG/1
650 TABLET ORAL EVERY 4 HOURS PRN
Status: DISCONTINUED | OUTPATIENT
Start: 2021-07-12 | End: 2021-07-14

## 2021-07-12 RX ORDER — EPINEPHRINE 1 MG/ML
0.3 INJECTION, SOLUTION, CONCENTRATE INTRAVENOUS EVERY 5 MIN PRN
Status: CANCELLED | OUTPATIENT
Start: 2021-07-12

## 2021-07-12 RX ORDER — FENTANYL CITRATE 50 UG/ML
25 INJECTION, SOLUTION INTRAMUSCULAR; INTRAVENOUS
Status: DISCONTINUED | OUTPATIENT
Start: 2021-07-12 | End: 2021-07-14

## 2021-07-12 RX ORDER — ACETAMINOPHEN 650 MG/1
650 SUPPOSITORY RECTAL EVERY 4 HOURS PRN
Status: DISCONTINUED | OUTPATIENT
Start: 2021-07-12 | End: 2021-07-14

## 2021-07-12 RX ORDER — NALOXONE HYDROCHLORIDE 0.4 MG/ML
0.2 INJECTION, SOLUTION INTRAMUSCULAR; INTRAVENOUS; SUBCUTANEOUS
Status: DISCONTINUED | OUTPATIENT
Start: 2021-07-12 | End: 2021-07-12 | Stop reason: HOSPADM

## 2021-07-12 RX ORDER — NALOXONE HYDROCHLORIDE 0.4 MG/ML
0.2 INJECTION, SOLUTION INTRAMUSCULAR; INTRAVENOUS; SUBCUTANEOUS
Status: CANCELLED | OUTPATIENT
Start: 2021-07-12

## 2021-07-12 RX ORDER — LIDOCAINE 40 MG/G
CREAM TOPICAL
Status: DISCONTINUED | OUTPATIENT
Start: 2021-07-12 | End: 2021-07-14

## 2021-07-12 RX ORDER — MEPERIDINE HYDROCHLORIDE 25 MG/ML
25 INJECTION INTRAMUSCULAR; INTRAVENOUS; SUBCUTANEOUS EVERY 30 MIN PRN
Status: DISCONTINUED | OUTPATIENT
Start: 2021-07-12 | End: 2021-07-12 | Stop reason: HOSPADM

## 2021-07-12 RX ORDER — NITROGLYCERIN 20 MG/100ML
10-200 INJECTION INTRAVENOUS CONTINUOUS
Status: DISCONTINUED | OUTPATIENT
Start: 2021-07-12 | End: 2021-07-12

## 2021-07-12 RX ORDER — EPINEPHRINE 1 MG/ML
0.3 INJECTION, SOLUTION, CONCENTRATE INTRAVENOUS EVERY 5 MIN PRN
Status: DISCONTINUED | OUTPATIENT
Start: 2021-07-12 | End: 2021-07-12 | Stop reason: HOSPADM

## 2021-07-12 RX ORDER — AMOXICILLIN 250 MG
1 CAPSULE ORAL 2 TIMES DAILY
Status: DISCONTINUED | OUTPATIENT
Start: 2021-07-12 | End: 2021-07-14

## 2021-07-12 RX ORDER — HEPARIN SODIUM 1000 [USP'U]/ML
INJECTION, SOLUTION INTRAVENOUS; SUBCUTANEOUS
Status: DISCONTINUED | OUTPATIENT
Start: 2021-07-12 | End: 2021-07-13

## 2021-07-12 RX ORDER — NICARDIPINE HYDROCHLORIDE 2.5 MG/ML
INJECTION INTRAVENOUS
Status: DISCONTINUED | OUTPATIENT
Start: 2021-07-12 | End: 2021-07-13

## 2021-07-12 RX ORDER — DIPHENHYDRAMINE HYDROCHLORIDE 50 MG/ML
50 INJECTION INTRAMUSCULAR; INTRAVENOUS
Status: CANCELLED
Start: 2021-07-12

## 2021-07-12 RX ORDER — MYCOPHENOLATE MOFETIL 250 MG/1
750 CAPSULE ORAL 2 TIMES DAILY
Status: DISCONTINUED | OUTPATIENT
Start: 2021-07-12 | End: 2021-07-14

## 2021-07-12 RX ORDER — HEPARIN SODIUM 10000 [USP'U]/100ML
0-5000 INJECTION, SOLUTION INTRAVENOUS CONTINUOUS
Status: DISCONTINUED | OUTPATIENT
Start: 2021-07-12 | End: 2021-07-13 | Stop reason: DRUGHIGH

## 2021-07-12 RX ORDER — METHYLPREDNISOLONE SODIUM SUCCINATE 125 MG/2ML
125 INJECTION, POWDER, LYOPHILIZED, FOR SOLUTION INTRAMUSCULAR; INTRAVENOUS
Status: CANCELLED
Start: 2021-07-12

## 2021-07-12 RX ORDER — ASPIRIN 81 MG/1
81 TABLET ORAL DAILY
Status: DISCONTINUED | OUTPATIENT
Start: 2021-07-13 | End: 2021-07-14

## 2021-07-12 RX ORDER — MEPERIDINE HYDROCHLORIDE 25 MG/ML
25 INJECTION INTRAMUSCULAR; INTRAVENOUS; SUBCUTANEOUS EVERY 30 MIN PRN
Status: CANCELLED | OUTPATIENT
Start: 2021-07-12

## 2021-07-12 RX ORDER — ATROPINE SULFATE 0.1 MG/ML
0.5 INJECTION INTRAVENOUS
Status: DISCONTINUED | OUTPATIENT
Start: 2021-07-12 | End: 2021-07-13

## 2021-07-12 RX ORDER — ALBUTEROL SULFATE 90 UG/1
1-2 AEROSOL, METERED RESPIRATORY (INHALATION)
Status: CANCELLED
Start: 2021-07-12

## 2021-07-12 RX ORDER — PANTOPRAZOLE SODIUM 40 MG/1
40 TABLET, DELAYED RELEASE ORAL
Status: DISCONTINUED | OUTPATIENT
Start: 2021-07-13 | End: 2021-07-14

## 2021-07-12 RX ORDER — PREDNISONE 5 MG/1
5 TABLET ORAL DAILY
Status: DISCONTINUED | OUTPATIENT
Start: 2021-07-13 | End: 2021-07-14

## 2021-07-12 RX ORDER — IOPAMIDOL 755 MG/ML
INJECTION, SOLUTION INTRAVASCULAR
Status: DISCONTINUED | OUTPATIENT
Start: 2021-07-12 | End: 2021-07-13

## 2021-07-12 RX ORDER — FLUMAZENIL 0.1 MG/ML
0.2 INJECTION, SOLUTION INTRAVENOUS
Status: DISCONTINUED | OUTPATIENT
Start: 2021-07-12 | End: 2021-07-13

## 2021-07-12 RX ORDER — FENTANYL CITRATE 50 UG/ML
INJECTION, SOLUTION INTRAMUSCULAR; INTRAVENOUS
Status: DISCONTINUED | OUTPATIENT
Start: 2021-07-12 | End: 2021-07-13

## 2021-07-12 RX ORDER — NITROGLYCERIN 5 MG/ML
VIAL (ML) INTRAVENOUS
Status: DISCONTINUED | OUTPATIENT
Start: 2021-07-12 | End: 2021-07-13

## 2021-07-12 RX ORDER — NALOXONE HYDROCHLORIDE 0.4 MG/ML
0.2 INJECTION, SOLUTION INTRAMUSCULAR; INTRAVENOUS; SUBCUTANEOUS
Status: DISCONTINUED | OUTPATIENT
Start: 2021-07-12 | End: 2021-07-13

## 2021-07-12 RX ORDER — DIPHENHYDRAMINE HYDROCHLORIDE 50 MG/ML
50 INJECTION INTRAMUSCULAR; INTRAVENOUS
Status: DISCONTINUED | OUTPATIENT
Start: 2021-07-12 | End: 2021-07-12 | Stop reason: HOSPADM

## 2021-07-12 RX ORDER — ONDANSETRON 2 MG/ML
4 INJECTION INTRAMUSCULAR; INTRAVENOUS EVERY 6 HOURS PRN
Status: DISCONTINUED | OUTPATIENT
Start: 2021-07-12 | End: 2021-07-14

## 2021-07-12 RX ORDER — NALOXONE HYDROCHLORIDE 0.4 MG/ML
0.4 INJECTION, SOLUTION INTRAMUSCULAR; INTRAVENOUS; SUBCUTANEOUS
Status: DISCONTINUED | OUTPATIENT
Start: 2021-07-12 | End: 2021-07-13

## 2021-07-12 RX ORDER — NITROGLYCERIN 0.4 MG/1
0.4 TABLET SUBLINGUAL EVERY 5 MIN PRN
Status: DISCONTINUED | OUTPATIENT
Start: 2021-07-12 | End: 2021-07-14

## 2021-07-12 RX ORDER — ALBUTEROL SULFATE 90 UG/1
1-2 AEROSOL, METERED RESPIRATORY (INHALATION)
Status: DISCONTINUED | OUTPATIENT
Start: 2021-07-12 | End: 2021-07-12 | Stop reason: HOSPADM

## 2021-07-12 RX ORDER — LAMIVUDINE 100 MG/1
100 TABLET, FILM COATED ORAL DAILY
Status: DISCONTINUED | OUTPATIENT
Start: 2021-07-13 | End: 2021-07-14

## 2021-07-12 RX ORDER — POLYETHYLENE GLYCOL 3350 17 G/17G
17 POWDER, FOR SOLUTION ORAL DAILY
Status: DISCONTINUED | OUTPATIENT
Start: 2021-07-12 | End: 2021-07-14

## 2021-07-12 RX ORDER — ALBUTEROL SULFATE 5 MG/ML
2.5 SOLUTION RESPIRATORY (INHALATION)
Status: DISCONTINUED | OUTPATIENT
Start: 2021-07-12 | End: 2021-07-12 | Stop reason: HOSPADM

## 2021-07-12 RX ORDER — METHYLPREDNISOLONE SODIUM SUCCINATE 125 MG/2ML
125 INJECTION, POWDER, LYOPHILIZED, FOR SOLUTION INTRAMUSCULAR; INTRAVENOUS
Status: DISCONTINUED | OUTPATIENT
Start: 2021-07-12 | End: 2021-07-12 | Stop reason: HOSPADM

## 2021-07-12 RX ORDER — ASPIRIN 81 MG/1
324 TABLET, CHEWABLE ORAL ONCE
Status: COMPLETED | OUTPATIENT
Start: 2021-07-12 | End: 2021-07-12

## 2021-07-12 RX ORDER — AMOXICILLIN 250 MG
2 CAPSULE ORAL 2 TIMES DAILY
Status: DISCONTINUED | OUTPATIENT
Start: 2021-07-12 | End: 2021-07-14

## 2021-07-12 RX ADMIN — ASPIRIN 81 MG CHEWABLE TABLET 324 MG: 81 TABLET CHEWABLE at 18:39

## 2021-07-12 RX ADMIN — HEPARIN SODIUM 1450 UNITS/HR: 10000 INJECTION, SOLUTION INTRAVENOUS at 19:32

## 2021-07-12 RX ADMIN — MYCOPHENOLATE MOFETIL 750 MG: 250 CAPSULE ORAL at 23:09

## 2021-07-12 RX ADMIN — DARBEPOETIN ALFA 40 MCG: 40 INJECTION, SOLUTION INTRAVENOUS; SUBCUTANEOUS at 11:50

## 2021-07-12 ASSESSMENT — ACTIVITIES OF DAILY LIVING (ADL)
DIFFICULTY_EATING/SWALLOWING: NO
TOILETING_ISSUES: NO
WEAR_GLASSES_OR_BLIND: YES
DIFFICULTY_COMMUNICATING: NO
VISION_MANAGEMENT: GLASSES
DOING_ERRANDS_INDEPENDENTLY_DIFFICULTY: NO
DRESSING/BATHING_DIFFICULTY: NO
CONCENTRATING,_REMEMBERING_OR_MAKING_DECISIONS_DIFFICULTY: NO
WALKING_OR_CLIMBING_STAIRS_DIFFICULTY: NO
FALL_HISTORY_WITHIN_LAST_SIX_MONTHS: NO
EQUIPMENT_CURRENTLY_USED_AT_HOME: CANE, STRAIGHT

## 2021-07-12 ASSESSMENT — ENCOUNTER SYMPTOMS: LIGHT-HEADEDNESS: 1

## 2021-07-12 ASSESSMENT — MIFFLIN-ST. JEOR: SCORE: 1631.85

## 2021-07-12 NOTE — TELEPHONE ENCOUNTER
Anemia Management Note  SUBJECTIVE/OBJECTIVE:  Referred by Dr. Eloy Rao on 2021  Primary Diagnosis: Anemia in Chronic Kidney Disease (N18.3, D63.1)   3b  Secondary Diagnosis:  Chronic Kidney Disease, Stage 3 (N18.3)  3b  Liver Tx: 2019  Hgb goal range:  9-10  Epo/Darbo: Aranesp 40mcg every 14 days for Hgb <10.  In Clinic.   Iron regimen:  NA.  Iron levels stable  Labs : 2022  Recent IVELISSE use, transfusion, IV iron: Aranesp   RX/TX plans :  6/10/2022     No history of stroke, MI and blood clots.  History of hepatocellular carcinoma - s/p TACE 2019 and liver transplant 2019.     Contact:            Ok to leave message regarding scheduling, medical, and billing per consent to communicate dated 10/2/19                              OK to speak with Davi Saunders (friend/POA) regarding scheduling, medical, and billing per consent to communicate dated 10/2/19    Anemia Latest Ref Rng & Units 3/26/2021 2021 2021 2021 2021 2021 2021   IVELISSE Dose - - - - - 40mcg 40mcg 40mcg   Hemoglobin 13.3 - 17.7 g/dL 13.0(L) 12.1(L) 11.6(L) 9.0(L) 8.8(L) 7.3(L) 6.3   TSAT 15 - 46 % - - - 59(H) - 50(H) -   Ferritin 26 - 388 ng/mL - - - 399(H) - 495(H) -   PRBCs - - - - - - - -     BP Readings from Last 3 Encounters:   21 109/67   21 115/68   21 124/74     Wt Readings from Last 2 Encounters:   21 173 lb 11.2 oz (78.8 kg)   20 173 lb (78.5 kg)           ASSESSMENT:  Hgb:Not at goal/Unknown  TSat: at goal >30% Ferritin: At goal (>100ng/mL)    PLAN:  Dose with aranesp and RTC for hgb then aranesp if needed in 2 week(s). Radha has paged MD re PRBCs    Orders needed to be renewed (for next follow-up date) in EPIC: None    Iron labs due:  12 weeks    Plan discussed with:  Radha RNC. She haspaged MD re critical Hgb  Plan provided by:  adri    NEXT FOLLOW-UP DATE:  21    Jie Blum RN   Anemia Services  [address] [place] [address]   [email or web address]   Office : [phone]  Fax: [phone]

## 2021-07-12 NOTE — TELEPHONE ENCOUNTER
DATE:  7/12/2021   TIME OF RECEIPT FROM LAB:  10:51  LAB TEST:  Hgb  LAB VALUE:  6.3  RESULTS GIVEN WITH READ-BACK TO (PROVIDER):  Radha Ndiaye  TIME LAB VALUE REPORTED TO PROVIDER:   11:00 am

## 2021-07-12 NOTE — PROGRESS NOTES
Frequency: 14 day  Most recent or today's HGB: 6.3   Date:   Date of lat dose:   HGB associated with last dose given: 7.3    Blood Pressure:109/67     Diagnosis: CKD 3/ Anemia chronic  Ordered by: Horacio Mak Offered: yes    Double Checked by: Maggie Lewis    See MAR for administration details    Pt's first name, last name and  verified prior to medication administration, injection given without complications or questions.     Chasity Acosta, CMA

## 2021-07-12 NOTE — ED TRIAGE NOTES
Pt presents ambulatory to triage with c/o hgb 6.3 and feeling light headed. Pt also endorses chest pain and L arm pain intermittently for the past several days. Pt was called by clinic and advised to go to the ED for transfusion and EKG. Denies s/s of bleeding, on daily aspirin  Sharona Mueller RN on 7/12/2021 at 2:27 PM

## 2021-07-12 NOTE — TELEPHONE ENCOUNTER
Diagnosis: Anemia    Notified patient regarding critical hemoglobin of 6.3 g/dL which has been a steady decline since May, 2021.  No evidence of GI bleed or other acute blood loss.  Patient undergoing epogen therapy for anemia of CKD but severity of anemia seems out of proportion for degree of renal insufficiency.      Patient reports being light headed, fatigued and complaining of chest pain with exertion.      Plan:  -instructed patient to go to the ER for possible blood transfusion and evaluation of chest pain     Eloy Rao MD   [phone]

## 2021-07-12 NOTE — Clinical Note
Sheath inserted in the right femoral artery. Long 4 fr in the RFA followed by a short 9 fr in the RFA

## 2021-07-12 NOTE — ED PROVIDER NOTES
[place] EMERGENCY DEPARTMENT ([facility])  7/12/21  History     Chief Complaint   Patient presents with     Abnormal Labs     Chest Pain     The history is provided by the patient and medical records.     Frandy Workman is a 56 year old male with a past medical history significant for hepatocellular carcinoma s/p liver transplant (immunosuppresed), anemia 2/2 CKD stage III, CAD, and type 2 diabetes mellitus who presents to the Emergency Department for evaluation of hgb of 6.3, lightheadedness, and chest pain. He reports his hgb level was 7.3 two weeks prior. Patient endorses chest pain and left arm pain upon exertion for the past several days. At approximately 8 pm each night he climbs the stairs after taking his medication which triggers chest pain and left arm pain.  Patient denies blood in his stool or hematuria.    I have reviewed the Medications, Allergies, Past Medical and Surgical History, and Social History in the [employer] system.  PAST MEDICAL HISTORY:   Past Medical History:   Diagnosis Date     Anemia 2013     Arthritis      BPH (benign prostatic hyperplasia)      CAD (coronary artery disease) 4/1/2019     Cholelithiasis      Conductive hearing loss 08/16/2017     Depressive disorder 1986    Suffer effects throughout life     Gastroesophageal reflux disease 12/01/2014     HCC (hepatocellular carcinoma) (H) 1/22/2019     History of diabetic retinopathy 07/2018     HTN (hypertension)      HTN (hypertension) 11/20/2019     Hyperlipidemia      Liver cirrhosis secondary to ESTRADA (H)      Liver transplanted (H) 11/11/2019     Portal vein thrombosis 4/11/2019     Type II diabetes mellitus (H)        PAST SURGICAL HISTORY:   Past Surgical History:   Procedure Laterality Date     COLONOSCOPY      2015     COLONOSCOPY N/A 12/6/2019    Procedure: COLONOSCOPY, WITH POLYPECTOMY AND BIOPSY;  Surgeon: Adam Morton MD;  Location:  GI     CV CENTRAL VENOUS CATHETER PLACEMENT N/A 7/12/2021    Procedure:  Central Venous Catheter Placement;  Surgeon: Fermin Polanco MD;  Location:  HEART CARDIAC CATH LAB     CV CORONARY ANGIOGRAM N/A 7/12/2021    Procedure: Coronary Angiogram;  Surgeon: Fermin Polanco MD;  Location:  HEART CARDIAC CATH LAB     CV HEART CATHETERIZATION WITH POSSIBLE INTERVENTION N/A 2/26/2019    Procedure: CORS;  Surgeon: Jagdish Hoyt MD;  Location:  HEART CARDIAC CATH LAB     CV INTRA AORTIC BALLOON N/A 7/12/2021    Procedure: Intra Aortic Balloon Pump Insertion;  Surgeon: Fermin Polanco MD;  Location: [facility] CARDIAC CATH LAB     ESOPHAGOSCOPY, GASTROSCOPY, DUODENOSCOPY (EGD), COMBINED N/A 11/17/2016    Procedure: COMBINED ESOPHAGOSCOPY, GASTROSCOPY, DUODENOSCOPY (EGD);  Surgeon: Santi Rosas MD;  Location:  GI     ESOPHAGOSCOPY, GASTROSCOPY, DUODENOSCOPY (EGD), COMBINED N/A 11/17/2017    Procedure: COMBINED ESOPHAGOSCOPY, GASTROSCOPY, DUODENOSCOPY (EGD);  EGD;  Surgeon: Santi Rosas MD;  Location:  GI     ESOPHAGOSCOPY, GASTROSCOPY, DUODENOSCOPY (EGD), COMBINED N/A 12/28/2018    Procedure: EGD;  Surgeon: Santi Rosas MD;  Location:  OR     ESOPHAGOSCOPY, GASTROSCOPY, DUODENOSCOPY (EGD), COMBINED N/A 12/6/2019    Procedure: ESOPHAGOGASTRODUODENOSCOPY, WITH BIOPSY;  Surgeon: Adam Morton MD;  Location:  GI     ESOPHAGOSCOPY, GASTROSCOPY, DUODENOSCOPY (EGD), COMBINED N/A 2/13/2020    Procedure: ESOPHAGOGASTRODUODENOSCOPY (EGD);  Surgeon: Santi Rosas MD;  Location:  GI     HEAD & NECK SURGERY      12/2017 at [facility].      IMPLANT GOLD WEIGHT EYELID Right 11/16/2017    Procedure: IMPLANT WEIGHT EYELID;  Right Upper Eyelid Weight, right tarsal strip lower eyelid;  Surgeon: Milana Malave MD;  Location: [facility] OR     IR CHEMO EMBOLIZATION  1/22/2019     KNEE SURGERY Left      ORTHOPEDIC SURGERY       PAROTIDECTOMY, RADICAL NECK DISSECTION Right 11/2/2017    Procedure: PAROTIDECTOMY,  RADICAL NECK DISSECTION;  Right Superfacial Parotidectomy , Facial nerve repair. with [facility] facial nerve monitor.;  Surgeon: Asiya Morgan MD;  Location: [facility] OR     PICC INSERTION Left 2017    4fr SL BioFlo PICC, 44cm in the L basilic vein w/ tip in the low SVC     RETURN LIVER TRANSPLANT N/A 2019    Procedure: Exploratory laparotomy, hematoma evacuation, abdominal washout;  Surgeon: Александр Ramos MD;  Location: [facility] OR     TRANSPLANT LIVER RECIPIENT  DONOR N/A 2019    Procedure: TRANSPLANT, LIVER, RECIPIENT,  DONOR;  Surgeon: Александр Ramos MD;  Location: [facility] OR     VASCULAR SURGERY         Past medical history, past surgical history, medications, and allergies were reviewed with the patient. Additional pertinent items: None    FAMILY HISTORY:   Family History   Problem Relation Age of Onset     Prostate Cancer Maternal Grandfather      Substance Abuse Maternal Grandfather         Alcohol     Colon Cancer Father 60     Pancreatic Cancer Father 60     Prostate Cancer Father      Colorectal Cancer Father      Macular Degeneration Father      Cancer Father      Glaucoma Father      Skin Cancer Father      Colorectal Cancer Maternal Grandmother      Cancer Maternal Grandmother      Substance Abuse Maternal Grandmother         Alcohol     Colorectal Cancer Paternal Grandmother      Cancer Mother      Diabetes Mother          3/2016     Cerebrovascular Disease Mother         Passed away in Feb of this year, 80 years old.     Thyroid Disease Mother      Depression Mother      Asthma Sister         Had since birth     Thyroid Disease Sister      Depression Sister      Liver Disease No family hx of      Melanoma No family hx of        SOCIAL HISTORY:   Social History     Tobacco Use     Smoking status: Former Smoker     Packs/day: 6.00     Years: 30.00     Pack years: 180.00     Types: Cigars     Start date: 2016     Quit date: 10/25/2017     Years since quitting: 3.7      Smokeless tobacco: Former User     Types: Chew     Quit date: 10/31/2017     Tobacco comment: 1 tin per week   Substance Use Topics     Alcohol use: No     Alcohol/week: 0.0 standard drinks     Comment: quit Sept. 1996     Social history was reviewed with the patient. Additional pertinent items: None      Current Discharge Medication List      CONTINUE these medications which have NOT CHANGED    Details   Acetaminophen (TYLENOL) 325 MG CAPS Take 325-650 mg by mouth every 4 hours as needed (pain, fever)  Qty: 100 capsule, Refills: 1    Associated Diagnoses: Liver transplant recipient (H)      ARIPiprazole (ABILIFY) 5 MG tablet daily      Artificial Tear Solution (SM ARTIFICIAL TEARS) SOLN Place 1 drop into the right eye every hour as needed (dry eyes) Apply at least 4 times daily and as needed for dry eye  Qty: 15 mL, Refills: 1    Associated Diagnoses: Dry eyes      aspirin 81 MG EC tablet Take 1 tablet (81 mg) by mouth daily  Qty: 90 tablet, Refills: 1    Associated Diagnoses: Liver transplant recipient (H); Type 2 diabetes mellitus without complication, with long-term current use of insulin (H)      BD VIKTORIA U/F 32G X 4 MM insulin pen needle Use 5 per day  Qty: 300 each, Refills: 3    Associated Diagnoses: Type 2 diabetes mellitus without complication, with long-term current use of insulin (H)      carvedilol (COREG) 3.125 MG tablet Take 1 tablet (3.125 mg) by mouth 2 times daily (with meals)  Qty: 60 tablet, Refills: 5    Associated Diagnoses: Hypertension, unspecified type      ciclopirox (LOPROX) 0.77 % cream Apply topically 2 times daily To feet and toenails.  Qty: 90 g, Refills: 5    Associated Diagnoses: Tinea pedis of both feet      Continuous Blood Gluc  (FREESTProsensa CLAUDIA 14 DAY READER) CECILIO Use to read blood sugars as per 's instructions.  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, unspecified whether long term insulin use (H)      glucose (BD GLUCOSE) 4 g chewable tablet Take 1 tablet by mouth every hour as needed for low blood sugar  Qty: 60 tablet, Refills: 1    Associated Diagnoses: Type 2 diabetes mellitus with other specified complication, with long-term current use of insulin (H)      insulin aspart (NOVOLOG PEN) 100 UNIT/ML pen Inject 1-15 Units Subcutaneous 3 times daily (with meals)  Qty: 20 mL, Refills: 3    Associated Diagnoses: Type 2 diabetes mellitus with stage 3 chronic kidney disease, with long-term current use of insulin (H)      insulin degludec (TRESIBA FLEXTOUCH) 100 UNIT/ML pen Inject 27 units subcutaneously daily  Qty: 25 mL, Refills: 3    Associated Diagnoses: Type 2 diabetes mellitus with stage 3 chronic kidney disease, with long-term current use of insulin (H)      lamiVUDine (EPIVIR) 100 MG tablet Take 1 tablet (100 mg) by mouth daily  Qty: 90 tablet, Refills: 3    Associated Diagnoses: Status post liver transplantation (H); Hepatitis B      Multiple Vitamin (THERAVITE PO) Take 1 tablet by mouth every morning       mycophenolate (GENERIC EQUIVALENT) 250 MG capsule Take 3 capsules (750 mg) by mouth every 12 hours  Qty: 180 capsule, Refills: 11    Comments: TXP DT 11/11/2019 (Liver) TXP Dischg DT 11/20/2019 DX Liver replaced by transplant Z94.4 TX Center [facility]  ([place], [place])  Associated Diagnoses: Status post liver transplantation (H)      order for DME 1 Device by Device route daily Knee high compression socks 15-20 mmhg.  Qty: 1 Device, Refills: 0    Associated Diagnoses: Peripheral edema      pantoprazole (PROTONIX) 40 MG EC tablet TAKE ONE TABLET BY MOUTH EVERY MORNING BEFORE BREAKFAST  Qty: 90 tablet, Refills: 3    Associated Diagnoses: Liver transplant recipient (H)      polyethylene glycol (MIRALAX) 17 g packet Take 1 packet by mouth daily      predniSONE (DELTASONE) 5 MG tablet Take 1 tablet (5 mg) by mouth daily  Qty: 90 tablet, Refills: 3    Comments: TXP DT 11/11/2019 (Liver) TXP Dischg DT 11/20/2019 DX Liver replaced by transplant Z94.4 TX Center [facility] [place] ([place], [place])  Associated Diagnoses: Status post liver transplantation (H)      rosuvastatin (CRESTOR) 5 MG tablet Take 1 tablet (5 mg) by mouth daily  Qty: 90 tablet, Refills: 3    Associated Diagnoses: Coronary artery disease involving native coronary artery of native heart without angina pectoris      tamsulosin (FLOMAX) 0.4 MG capsule Take 1 capsule (0.4 mg) by mouth daily  Qty: 90 capsule, Refills: 3    Associated Diagnoses: Benign prostatic hyperplasia with lower urinary tract symptoms, symptom details unspecified      vitamin B-12 (CYANOCOBALAMIN) 1000 MCG tablet Take 1 tablet (1,000 mcg) by mouth daily  Qty: 30 tablet, Refills: 11    Associated Diagnoses: Liver transplant recipient (H)      vitamin D3 (CHOLECALCIFEROL) 50 mcg (2000 units) tablet Take 1 tablet (50 mcg) by mouth daily  Qty: 90 tablet, Refills: 3    Associated Diagnoses: Liver transplant recipient (H)                Allergies   Allergen Reactions     Codeine Other (See Comments)     Cannot take due to liver  Cannot tolerate oral narcotics     Seasonal Allergies      Sneezing, coughing, runny and itchy eyes        Review of Systems   Cardiovascular: Positive for chest pain.   Musculoskeletal:  "       Positive for L arm pain   Neurological: Positive for light-headedness.     A complete review of systems was performed with pertinent positives and negatives noted in the HPI, and all other systems negative.    Physical Exam   BP: 116/57  Pulse: 82  Temp: 98.6  F (37  C)  Resp: 14  Height: 175.3 cm (5' 9\")  Weight: 81.6 kg (180 lb)  SpO2: 98 %      Physical Exam  Vitals and nursing note reviewed.   Constitutional:       General: He is not in acute distress.     Appearance: He is well-developed. He is not ill-appearing, toxic-appearing or diaphoretic.      Comments: Patient is awake and alert, mentating normally, no acute distress.  He does appear pale.   HENT:      Head: Normocephalic and atraumatic.      Mouth/Throat:      Lips: Pink.      Mouth: Mucous membranes are moist.      Pharynx: Oropharynx is clear. No oropharyngeal exudate.   Eyes:      General: Lids are normal. No scleral icterus.     Extraocular Movements: Extraocular movements intact.      Right eye: No nystagmus.      Left eye: No nystagmus.      Conjunctiva/sclera: Conjunctivae normal.      Pupils: Pupils are equal, round, and reactive to light.      Comments: Conjunctival pallor noted.   Neck:      Thyroid: No thyromegaly.      Vascular: No JVD.      Trachea: No tracheal deviation.   Cardiovascular:      Rate and Rhythm: Normal rate and regular rhythm.      Pulses: Normal pulses.      Heart sounds: Normal heart sounds. No murmur heard.   No friction rub. No gallop.    Pulmonary:      Effort: Pulmonary effort is normal. No respiratory distress.      Breath sounds: Normal breath sounds.   Abdominal:      General: Bowel sounds are normal. There is no distension.      Palpations: Abdomen is soft. There is no mass.      Tenderness: There is no abdominal tenderness. There is no guarding or rebound.   Musculoskeletal:         General: No tenderness. Normal range of motion.      Cervical back: Normal range of motion and neck supple. No erythema or " rigidity.      Right lower leg: No edema.      Left lower leg: No edema.   Lymphadenopathy:      Cervical: No cervical adenopathy.   Skin:     General: Skin is warm and dry.      Capillary Refill: Capillary refill takes less than 2 seconds.      Coloration: Skin is not pale.      Findings: No erythema or rash.   Neurological:      Mental Status: He is alert and oriented to person, place, and time.      Cranial Nerves: No cranial nerve deficit.      Sensory: No sensory deficit.      Motor: Motor function is intact.   Psychiatric:         Mood and Affect: Mood and affect normal.         Speech: Speech normal.         Behavior: Behavior normal.         ED Course     At 3:23 PM the patient was seen and examined by Gonzalo Manning MD in Room ED07.        Procedures            EKG Interpretation:      Interpreted by Gonzalo Manning MD  Time reviewed: 1530  Symptoms at time of EKG: Exertional chest pain, anemia  Rhythm: normal sinus   Rate: 81   Ectopy: none  Conduction: LVH  ST Segments/ T Waves: ST Segment Depression Lateral, High lateral and Inferior  Q Waves: none  Comparison to prior: Mild ST depression noted to be changed from old EKG    Clinical Impression: Sinus rhythm with ST depression concerning for demand ischemia given patient's significant anemia           EKG Interpretation:      Interpreted by Gonzalo Manning MD  Time reviewed:1823   Symptoms at time of EKG: Return of chest pain  Rhythm: Normal sinus  and Sinus tachycardia  Rate: 120  Ectopy: None  Conduction: Normal  ST Segments/ T Waves: ST Segement Elevation aVR and ST Segment Depression Lateral, High lateral and Inferior  Q Waves: None  Comparison to prior: Significantly more pronounced ST depressions with now apparent ST elevation in aVR    Clinical Impression: STEMI             Critical Care Addendum    My initial assessment, based on my review of nursing observations, review of vital signs, focused history, physical exam  and 12 lead ECG analysis, established that Frandy Workman has worsening anemia with chest pain and STEMI identified on serial exam, which requires immediate intervention, and therefore he is critically ill.     After the initial assessment, the care team initiated multiple lab tests, initiated medication therapy with Aspirin, nitroglycerin, and blood products and consulted with Cardiology to provide stabilization care. Due to the critical nature of this patient, I reassessed nursing observations, vital signs, physical exam, 12 lead ECG analysis, mental status and respiratory status multiple times prior to his disposition.     Time also spent performing documentation, reviewing test results, discussion with consultants and coordination of care.     Critical care time (excluding teaching time and procedures): 40 minutes.           Labs Ordered and Resulted from Time of ED Arrival Up to the Time of Departure from the ED   TROPONIN I - Abnormal; Notable for the following components:       Result Value    Troponin I 0.120 (*)     All other components within normal limits   INR - Normal   EXTRA RED TOP TUBE   EXTRA GREEN TOP (LITHIUM HEPARIN) TUBE   IP ASSIGN PROVIDER TEAM TO TREATMENT TEAM   NO VTE PROPHYLAXIS   MEASURE WEIGHT   NOTIFY PHYSICIAN   NOTIFY PHYSICIAN   DAILY WEIGHTS   INTAKE AND OUTPUT   OBTAIN MEDICAL RECORDS   DOCUMENT   PATIENT EDUCATION   TELEMETRY MONITORING CARDIOLOGY ADULT   VITAL SIGNS AND PAIN ASSESSMENT   PULSE OXIMETRY NURSING   PERIPHERAL IV CATHETER   NOTIFY PROVIDER   TYPE AND SCREEN, ADULT   PREPARE RED BLOOD CELLS (UNIT)   PREPARE RED BLOOD CELLS (UNIT)   PREPARE RED BLOOD CELLS (UNIT)   TRANSFUSE RED BLOOD CELLS (UNIT)   TRANSFUSE RED BLOOD CELLS (UNIT)   ABO/RH TYPE AND SCREEN    Narrative:     The following orders were created for panel order ABO/Rh type and screen.  Procedure                               Abnormality         Status                     ---------                                -----------         ------                     Adult Type and Screen[[id number]]                            Final result                 Please view results for these tests on the individual orders.   EXTRA TUBE    Narrative:     The following orders were created for panel order [place] Draw.  Procedure                               Abnormality         Status                     ---------                               -----------         ------                     Extra Red Top Tube[[id number]]                               Final result               Extra Green Top (Lithium...[[id number]]                      Final result                 Please view results for these tests on the individual orders.         Medications   lidocaine 1 % 0.1-1 mL (has no administration in time range)   lidocaine (LMX4) cream (has no administration in time range)   sodium chloride (PF) 0.9% PF flush 3 mL (3 mLs Intracatheter Not Given 7/13/21 1107)   sodium chloride (PF) 0.9% PF flush 3 mL (has no administration in time range)   medication instruction (has no administration in time range)   nitroGLYcerin (NITROSTAT) sublingual tablet 0.4 mg (has no administration in time range)   alum & mag hydroxide-simethicone (MAALOX) suspension 30 mL (has no administration in time range)   acetaminophen (TYLENOL) tablet 650 mg (650 mg Oral Given 7/13/21 1407)   acetaminophen (TYLENOL) Suppository 650 mg (has no administration in time range)   sodium chloride 0.9% infusion ( Intravenous Canceled Entry 7/12/21 7997)   Patient is already receiving anticoagulation with heparin, enoxaparin (LOVENOX), warfarin (COUMADIN)  or other anticoagulant medication (has no administration in time range)   aspirin EC tablet 81 mg (81 mg Oral Given 7/13/21 0748)   senna-docusate (SENOKOT-S/PERICOLACE) 8.6-50 MG per tablet 1 tablet (1 tablet Oral Given 7/13/21 0747)     Or   senna-docusate (SENOKOT-S/PERICOLACE) 8.6-50 MG per tablet 2 tablet ( Oral See Alternative  7/13/21 0747)   polyethylene glycol (MIRALAX) Packet 17 g (17 g Oral Not Given 7/13/21 0757)   ondansetron (ZOFRAN-ODT) ODT tab 4 mg ( Oral See Alternative 7/13/21 1644)     Or   ondansetron (ZOFRAN) injection 4 mg ( Intravenous Canceled Entry 7/13/21 1644)   No anticoagulation (IABP 1:1) (has no administration in time range)   fentaNYL (PF) (SUBLIMAZE) injection 25 mcg (has no administration in time range)   midazolam (VERSED) injection 0.5 mg (has no administration in time range)   HOLD:  Metformin and metformin containing medications if patient received IV contrast with acute kidney injury or severe chronic kidney disease (stage IV or stage V; i.e., eGFR less than 30) (has no administration in time range)   empagliflozin (JARDIANCE) tablet 25 mg ( Oral Automatically Held 7/16/21 0800)   lamiVUDine (EPIVIR) tablet 100 mg (100 mg Oral Given 7/13/21 0748)   mycophenolate (GENERIC EQUIVALENT) capsule 750 mg (750 mg Oral Given 7/13/21 0748)   pantoprazole (PROTONIX) EC tablet 40 mg (40 mg Oral Given 7/13/21 0747)   predniSONE (DELTASONE) tablet 5 mg (5 mg Oral Given 7/13/21 0756)   rosuvastatin (CRESTOR) tablet 5 mg (5 mg Oral Given 7/13/21 0748)   heparin 25,000 units in 0.45% NaCl 250 mL ANTICOAGULANT infusion (850 Units/hr Intravenous Rate/Dose Verify 7/13/21 1700)   carvedilol (COREG) tablet 3.125 mg (3.125 mg Oral Given 7/13/21 0748)   glucose gel 15-30 g (has no administration in time range)     Or   dextrose 50 % injection 25-50 mL (has no administration in time range)     Or   glucagon injection 1 mg (has no administration in time range)   insulin aspart (NovoLOG) injection (RAPID ACTING) (1 Units Subcutaneous Not Given 7/13/21 1602)   tamsulosin (FLOMAX) capsule 0.4 mg (0.4 mg Oral Given 7/13/21 0756)   ARIPiprazole (ABILIFY) tablet 5 mg (has no administration in time range)   aspirin (ASA) chewable tablet 324 mg (324 mg Oral Given 7/12/21 5935)   perflutren diluted 1mL to 2mL with saline (OPTISON) diluted  injection 5 mL (5 mLs Intravenous Given 7/13/21 0846)             Assessments & Plan (with Medical Decision Making)     This patient presented to the emergency department with worsening chronic anemia.  He had also complained of some exertional chest pain that began having over the past few nights.  No obvious source of bleeding is identified and after discussion with the patient's outpatient provider I feel that this is most likely a progressive chronic anemia.  He was typed and crossed for 2 units of packed red cells upon arrival and consented for blood transfusion.  Initial twelve-lead EKG demonstrated some findings that were concerning for ischemia with ST segment depressions noted and initial troponin is mildly elevated at 0.12.  I discussed these findings with cardiology and initial plan was to begin transfusions and follow troponin trend, observing the patient overnight.  It was felt at that time that the mild elevation of troponin and EKG changes was secondary to demand ischemia from the patient's anemia.  At one point, I went back into reassess the patient and talk to him and he was sitting up in the bed holding the left side of his chest stating that his pain had returned and that he did not feel right.  Repeat EKG was immediately ordered and reviewed demonstrating findings concerning for STEMI.  Patient was given his aspirin that he was due to take in the evening and I ordered a nitroglycerin drip after activating the Cath Lab for STEMI.  Patient had still not received his first unit of blood as there were significant delays getting crossmatched in the lab.  Given the patient's more critical turn I decided to order type O blood to be transfused emergently.  This blood arrived as the patient was going to the Cath Lab and will be started in the Cath Lab.  He was transferred to the Cath Lab in stable yet serious condition.    I have reviewed the nursing notes.    I have reviewed the findings, diagnosis, plan  and need for follow up with the patient.    Current Discharge Medication List          Final diagnoses:   ST elevation myocardial infarction (STEMI), unspecified artery (H)   Anemia, unspecified type       I, Courtney Friedman am serving as a trained medical scribe to document services personally performed by Gonzalo Manning MD, based on the provider's statements to me.      I, Gonzalo Manning MD, was physically present and have reviewed and verified the accuracy of this note documented by Courtney Friedman.     Gonzalo Manning MD  7/12/2021   [facility] EMERGENCY DEPARTMENT     Gonzalo Manning MD  07/13/21 2425

## 2021-07-12 NOTE — Clinical Note
IABP inserted in the right femoral artery. IABP inserted with 50 cc balloon volume Lot number is: [id number]

## 2021-07-12 NOTE — ED NOTES
Bed: IN02  Expected date: 7/12/21  Expected time:   Means of arrival:   Comments:  Frandy Workman medical record #[id number] 56-year-old male status post liver transplant secondary to Bergeron cirrhosis and chronic kidney disease with hemoglobin of 6.3 today and chest pain with exertion.  Anemia most likely chronic process but patient has been off epo for a few years and last hemoglobin was around 11.  Transfused 2 units of packed red cells, evaluate chest pain and evaluate for acute blood loss.  If improved and no acute process found patient may be able to discharge home after transfcruzito Manning, Gonzalo Seals MD  07/12/21 [id number]

## 2021-07-13 ENCOUNTER — APPOINTMENT (OUTPATIENT)
Dept: GENERAL RADIOLOGY | Facility: CLINIC | Age: 56
DRG: 234 | End: 2021-07-13
Attending: PHYSICIAN ASSISTANT
Payer: MEDICARE

## 2021-07-13 ENCOUNTER — PREP FOR PROCEDURE (OUTPATIENT)
Dept: CARDIOLOGY | Facility: CLINIC | Age: 56
End: 2021-07-13

## 2021-07-13 ENCOUNTER — APPOINTMENT (OUTPATIENT)
Dept: ULTRASOUND IMAGING | Facility: CLINIC | Age: 56
DRG: 234 | End: 2021-07-13
Attending: PHYSICIAN ASSISTANT
Payer: MEDICARE

## 2021-07-13 ENCOUNTER — ANESTHESIA EVENT (OUTPATIENT)
Dept: SURGERY | Facility: CLINIC | Age: 56
DRG: 234 | End: 2021-07-13
Payer: MEDICARE

## 2021-07-13 ENCOUNTER — APPOINTMENT (OUTPATIENT)
Dept: CARDIOLOGY | Facility: CLINIC | Age: 56
DRG: 234 | End: 2021-07-13
Attending: PHYSICIAN ASSISTANT
Payer: MEDICARE

## 2021-07-13 DIAGNOSIS — I25.10 CAD (CORONARY ARTERY DISEASE): Primary | ICD-10-CM

## 2021-07-13 LAB
ANION GAP SERPL CALCULATED.3IONS-SCNC: 7 MMOL/L (ref 3–14)
ATRIAL RATE - MUSE: 120 BPM
BUN SERPL-MCNC: 31 MG/DL (ref 7–30)
CALCIUM SERPL-MCNC: 8.7 MG/DL (ref 8.5–10.1)
CHLORIDE BLD-SCNC: 109 MMOL/L (ref 94–109)
CO2 SERPL-SCNC: 24 MMOL/L (ref 20–32)
CREAT SERPL-MCNC: 1.45 MG/DL (ref 0.66–1.25)
DIASTOLIC BLOOD PRESSURE - MUSE: NORMAL MMHG
ERYTHROCYTE [DISTWIDTH] IN BLOOD BY AUTOMATED COUNT: 16.2 % (ref 10–15)
FERRITIN SERPL-MCNC: 543 NG/ML (ref 26–388)
GFR SERPL CREATININE-BSD FRML MDRD: 53 ML/MIN/1.73M2
GLUCOSE BLD-MCNC: 162 MG/DL (ref 70–99)
GLUCOSE BLDC GLUCOMTR-MCNC: 120 MG/DL (ref 70–99)
GLUCOSE BLDC GLUCOMTR-MCNC: 126 MG/DL (ref 70–99)
GLUCOSE BLDC GLUCOMTR-MCNC: 127 MG/DL (ref 70–99)
GLUCOSE BLDC GLUCOMTR-MCNC: 131 MG/DL (ref 70–99)
GLUCOSE BLDC GLUCOMTR-MCNC: 134 MG/DL (ref 70–99)
GLUCOSE BLDC GLUCOMTR-MCNC: 136 MG/DL (ref 70–99)
GLUCOSE BLDC GLUCOMTR-MCNC: 158 MG/DL (ref 70–99)
HBA1C MFR BLD: 6.8 % (ref 0–5.6)
HCT VFR BLD AUTO: 26.1 % (ref 40–53)
HGB BLD-MCNC: 8 G/DL (ref 13.3–17.7)
HGB BLD-MCNC: 8.5 G/DL (ref 13.3–17.7)
HGB BLD-MCNC: 8.7 G/DL (ref 13.3–17.7)
INTERPRETATION ECG - MUSE: NORMAL
LDH SERPL L TO P-CCNC: 186 U/L (ref 85–227)
LVEF ECHO: NORMAL
MCH RBC QN AUTO: 30.1 PG (ref 26.5–33)
MCHC RBC AUTO-ENTMCNC: 32.6 G/DL (ref 31.5–36.5)
MCV RBC AUTO: 93 FL (ref 78–100)
P AXIS - MUSE: 60 DEGREES
PLATELET # BLD AUTO: 123 10E3/UL (ref 150–450)
POTASSIUM BLD-SCNC: 3.8 MMOL/L (ref 3.4–5.3)
PR INTERVAL - MUSE: 136 MS
QRS DURATION - MUSE: 78 MS
QT - MUSE: 318 MS
QTC - MUSE: 449 MS
R AXIS - MUSE: -6 DEGREES
RBC # BLD AUTO: 2.82 10E6/UL (ref 4.4–5.9)
SARS-COV-2 RNA RESP QL NAA+PROBE: NEGATIVE
SODIUM SERPL-SCNC: 140 MMOL/L (ref 133–144)
SYSTOLIC BLOOD PRESSURE - MUSE: NORMAL MMHG
T AXIS - MUSE: 206 DEGREES
TROPONIN I SERPL-MCNC: 0.18 UG/L (ref 0–0.04)
TROPONIN I SERPL-MCNC: 0.19 UG/L (ref 0–0.04)
TROPONIN I SERPL-MCNC: 0.2 UG/L (ref 0–0.04)
UFH PPP CHRO-ACNC: 0.37 IU/ML
UFH PPP CHRO-ACNC: 0.71 IU/ML
UFH PPP CHRO-ACNC: 0.91 IU/ML
VENTRICULAR RATE- MUSE: 120 BPM
WBC # BLD AUTO: 7 10E3/UL (ref 4–11)

## 2021-07-13 PROCEDURE — 85018 HEMOGLOBIN: CPT | Performed by: PHYSICIAN ASSISTANT

## 2021-07-13 PROCEDURE — 71045 X-RAY EXAM CHEST 1 VIEW: CPT | Mod: 26 | Performed by: RADIOLOGY

## 2021-07-13 PROCEDURE — 80048 BASIC METABOLIC PNL TOTAL CA: CPT | Performed by: INTERNAL MEDICINE

## 2021-07-13 PROCEDURE — 82962 GLUCOSE BLOOD TEST: CPT | Performed by: INTERNAL MEDICINE

## 2021-07-13 PROCEDURE — 93970 EXTREMITY STUDY: CPT | Mod: 26 | Performed by: RADIOLOGY

## 2021-07-13 PROCEDURE — 84484 ASSAY OF TROPONIN QUANT: CPT | Performed by: PHYSICIAN ASSISTANT

## 2021-07-13 PROCEDURE — 999N000065 XR CHEST PORT 1 VIEW

## 2021-07-13 PROCEDURE — 99223 1ST HOSP IP/OBS HIGH 75: CPT | Mod: 25 | Performed by: INTERNAL MEDICINE

## 2021-07-13 PROCEDURE — 99222 1ST HOSP IP/OBS MODERATE 55: CPT | Mod: GC | Performed by: STUDENT IN AN ORGANIZED HEALTH CARE EDUCATION/TRAINING PROGRAM

## 2021-07-13 PROCEDURE — 250N000011 HC RX IP 250 OP 636: Performed by: INTERNAL MEDICINE

## 2021-07-13 PROCEDURE — 93880 EXTRACRANIAL BILAT STUDY: CPT

## 2021-07-13 PROCEDURE — 250N000012 HC RX MED GY IP 250 OP 636 PS 637: Performed by: INTERNAL MEDICINE

## 2021-07-13 PROCEDURE — 82962 GLUCOSE BLOOD TEST: CPT

## 2021-07-13 PROCEDURE — 93880 EXTRACRANIAL BILAT STUDY: CPT | Mod: 26 | Performed by: RADIOLOGY

## 2021-07-13 PROCEDURE — 99222 1ST HOSP IP/OBS MODERATE 55: CPT | Mod: 57 | Performed by: SURGERY

## 2021-07-13 PROCEDURE — 93010 ELECTROCARDIOGRAM REPORT: CPT | Performed by: INTERNAL MEDICINE

## 2021-07-13 PROCEDURE — 93970 EXTREMITY STUDY: CPT

## 2021-07-13 PROCEDURE — 250N000013 HC RX MED GY IP 250 OP 250 PS 637: Performed by: INTERNAL MEDICINE

## 2021-07-13 PROCEDURE — 71045 X-RAY EXAM CHEST 1 VIEW: CPT

## 2021-07-13 PROCEDURE — 999N000075 HC STATISTIC IABP MONITORING

## 2021-07-13 PROCEDURE — 200N000002 HC R&B ICU UMMC

## 2021-07-13 PROCEDURE — 83036 HEMOGLOBIN GLYCOSYLATED A1C: CPT | Performed by: PHYSICIAN ASSISTANT

## 2021-07-13 PROCEDURE — 85520 HEPARIN ASSAY: CPT | Performed by: INTERNAL MEDICINE

## 2021-07-13 PROCEDURE — 82962 GLUCOSE BLOOD TEST: CPT | Performed by: OPHTHALMOLOGY

## 2021-07-13 PROCEDURE — 82962 GLUCOSE BLOOD TEST: CPT | Performed by: PSYCHOLOGIST

## 2021-07-13 PROCEDURE — 93005 ELECTROCARDIOGRAM TRACING: CPT

## 2021-07-13 PROCEDURE — 255N000002 HC RX 255 OP 636: Performed by: STUDENT IN AN ORGANIZED HEALTH CARE EDUCATION/TRAINING PROGRAM

## 2021-07-13 PROCEDURE — 999N000208 ECHOCARDIOGRAM COMPLETE

## 2021-07-13 PROCEDURE — 85027 COMPLETE CBC AUTOMATED: CPT | Performed by: INTERNAL MEDICINE

## 2021-07-13 PROCEDURE — 93306 TTE W/DOPPLER COMPLETE: CPT | Mod: 26 | Performed by: INTERNAL MEDICINE

## 2021-07-13 PROCEDURE — 250N000013 HC RX MED GY IP 250 OP 250 PS 637: Performed by: PHYSICIAN ASSISTANT

## 2021-07-13 PROCEDURE — 999N000157 HC STATISTIC RCP TIME EA 10 MIN

## 2021-07-13 RX ORDER — NICOTINE POLACRILEX 4 MG
15-30 LOZENGE BUCCAL
Status: DISCONTINUED | OUTPATIENT
Start: 2021-07-13 | End: 2021-07-14

## 2021-07-13 RX ORDER — HEPARIN SODIUM 10000 [USP'U]/100ML
0-5000 INJECTION, SOLUTION INTRAVENOUS CONTINUOUS
Status: DISCONTINUED | OUTPATIENT
Start: 2021-07-13 | End: 2021-07-14

## 2021-07-13 RX ORDER — TAMSULOSIN HYDROCHLORIDE 0.4 MG/1
0.4 CAPSULE ORAL DAILY
Status: DISCONTINUED | OUTPATIENT
Start: 2021-07-13 | End: 2021-07-14

## 2021-07-13 RX ORDER — ARIPIPRAZOLE 5 MG/1
5 TABLET ORAL EVERY EVENING
Status: DISCONTINUED | OUTPATIENT
Start: 2021-07-13 | End: 2021-07-14

## 2021-07-13 RX ORDER — ARIPIPRAZOLE 5 MG/1
5 TABLET ORAL DAILY
Status: DISCONTINUED | OUTPATIENT
Start: 2021-07-13 | End: 2021-07-13

## 2021-07-13 RX ORDER — NITROGLYCERIN 20 MG/100ML
10-200 INJECTION INTRAVENOUS CONTINUOUS
Status: CANCELLED | OUTPATIENT
Start: 2021-07-13

## 2021-07-13 RX ORDER — CARVEDILOL 3.12 MG/1
3.12 TABLET ORAL 2 TIMES DAILY WITH MEALS
Status: DISCONTINUED | OUTPATIENT
Start: 2021-07-13 | End: 2021-07-14

## 2021-07-13 RX ORDER — DEXTROSE MONOHYDRATE 25 G/50ML
25-50 INJECTION, SOLUTION INTRAVENOUS
Status: DISCONTINUED | OUTPATIENT
Start: 2021-07-13 | End: 2021-07-14

## 2021-07-13 RX ADMIN — ACETAMINOPHEN 650 MG: 325 TABLET, FILM COATED ORAL at 14:07

## 2021-07-13 RX ADMIN — ASPIRIN 81 MG: 81 TABLET, DELAYED RELEASE ORAL at 07:48

## 2021-07-13 RX ADMIN — CARVEDILOL 3.12 MG: 3.12 TABLET, FILM COATED ORAL at 17:51

## 2021-07-13 RX ADMIN — MYCOPHENOLATE MOFETIL 750 MG: 250 CAPSULE ORAL at 07:48

## 2021-07-13 RX ADMIN — HUMAN ALBUMIN MICROSPHERES AND PERFLUTREN 5 ML: 10; .22 INJECTION, SOLUTION INTRAVENOUS at 08:46

## 2021-07-13 RX ADMIN — TAMSULOSIN HYDROCHLORIDE 0.4 MG: 0.4 CAPSULE ORAL at 07:56

## 2021-07-13 RX ADMIN — PANTOPRAZOLE SODIUM 40 MG: 40 TABLET, DELAYED RELEASE ORAL at 07:47

## 2021-07-13 RX ADMIN — CARVEDILOL 3.12 MG: 3.12 TABLET, FILM COATED ORAL at 07:48

## 2021-07-13 RX ADMIN — PREDNISONE 5 MG: 5 TABLET ORAL at 07:56

## 2021-07-13 RX ADMIN — DOCUSATE SODIUM 50 MG AND SENNOSIDES 8.6 MG 1 TABLET: 8.6; 5 TABLET, FILM COATED ORAL at 07:47

## 2021-07-13 RX ADMIN — ARIPIPRAZOLE 5 MG: 5 TABLET ORAL at 21:17

## 2021-07-13 RX ADMIN — LAMIVUDINE 100 MG: 100 TABLET, FILM COATED ORAL at 07:48

## 2021-07-13 RX ADMIN — MYCOPHENOLATE MOFETIL 750 MG: 250 CAPSULE ORAL at 21:16

## 2021-07-13 RX ADMIN — DOCUSATE SODIUM 50 MG AND SENNOSIDES 8.6 MG 1 TABLET: 8.6; 5 TABLET, FILM COATED ORAL at 21:16

## 2021-07-13 RX ADMIN — ROSUVASTATIN CALCIUM 5 MG: 5 TABLET, FILM COATED ORAL at 07:48

## 2021-07-13 RX ADMIN — HEPARIN SODIUM 850 UNITS/HR: 10000 INJECTION, SOLUTION INTRAVENOUS at 17:36

## 2021-07-13 ASSESSMENT — LIFESTYLE VARIABLES: TOBACCO_USE: 1

## 2021-07-13 ASSESSMENT — ACTIVITIES OF DAILY LIVING (ADL)
ADLS_ACUITY_SCORE: 14
ADLS_ACUITY_SCORE: 12
ADLS_ACUITY_SCORE: 14
ADLS_ACUITY_SCORE: 14

## 2021-07-13 ASSESSMENT — MIFFLIN-ST. JEOR: SCORE: 1625.38

## 2021-07-13 NOTE — PROGRESS NOTES
[facility]   Critical Care Cardiology - Progress Note    Frandy Workman MRN: [id number]  Age: 56 year old, : 1965  Date: 2021    Assessment and Plan:  Frandy Workman is a 56 year old male who presents with PMH of CAD with non-occlusive LM disease, HTN, HLD, T2DM, chronic anemia, CKD III, ESTRADA cirrhosis s/p liver transplant 2019, possible HCC s/p TACE, h/o asxs HIV infection, Bipolar I, MDD, h/o gastric ulcers w/o CMV or H Pylori, and BPH who presents with NSTEMI with finding of 90% LM disease. S/p IABP. CVTS eval pending.    Today's Plan:  - echocardiogram today  - add sliding scale insulin  - hematology consult    Neurology  # Hx of Bipolar I  # Hx of MDD  Not on medications as an outpatient.  - continue to monitor    Cardiovascular  #NSTEMI  #CAD - Distal LM disease  Patient presented with chest pain, significant ST depressions on ECG. Trop 0.120 on initial presentation. Cath lab was activated given severity of the chest pain, found to have distal LM disease. IABP was placed for perfusion. CVTS to evaluate for CAB.  - CVTS consult; appreciate recommendations  - troponin Q6 hours x4  - echocardiogram today  - continue IABP 1:1  - continue low intensity heparin ggt  - PRN nitroglycerin; low threshold for nitroglycerin ggt  - goal directed medical therapy   - Antiplatelet: ASA 81   - Statin: 5mg Crestor daily (max dose in the setting of immunosuppression)   - BB: PTA coreg 3.125mg BID, no evidence of shock at this time. Will continue inpatient.   - ACE: hold for now    #HTN - PTA coreg as above  #HLD - last lipid panel 2020 LDL 61, , . Statin as above    Pulmonary  No active issues    Gastrointestinal, Nutrition  #ESTRADA Cirrhosis s/p liver transplant 2019  #Possible HCC Dx 2018 s/p TACE 2019  The explanted liver had 2 lesions that were mostly necrotic and less than 3 cm with no clearly identifiable vascular invasion.   - continue PTA   BID  - continue PTA Prednisone 5    Renal, Electrolytes  #CKD III   Baseline creatinine 1.5-1.7.  Currently at baseline. Likely diabetic nephropathy versus hypertensive  - BMP daily    Infectious Disease  #Hx Asymptomatic HIV infection   - continue PTA lamivudine    Hematology  #Anemia   Hgb 6.3 on presentation. Given 2U in the cath lab. H/o chronic anemia, Findings likely consistent of AOCD vs secondary to CKD. Outpatient referral to anemia clinic for consideration of IVELISSE therapy per nephrology. Unclear if rate of progression of anemia is out of proportion to what would be expected secondary to renal disease alone. Ferritin high, LDH normal  - iron, TIBC, haptoglobin, peripheral smear pending  - hematology consult    Endocrinology  #T2DM c/b retinopathy  PTA medications include Empagliflozin. Otherwise patient takes Tresiba 28 units /day. Carb ratio: 1:15g. Low dose Novolog correction 1U per 50 >180.  - hold PTA 25mg jardiance daily  - sliding scale insulin PRN  - hold PTA eylea, Novolog with meals, Tresiba    MSK/Skin  No active issues    Pertinent Lines  9F RFA (IABP)  Right internal jugular CVC      ICU Cares:  Daily CXR: IABP 1cm below jenifer, will advance  Fluids/Feeds: NPO pending possible CABG.  Fluids not indicated  DVT Prophylaxis: heparin ggt  GI Prophylaxis: not indicated  Bowel Regimen: senna, miralax  Anticipated Floor Transfer: N/A    Family Update: not applicable, patient is awake and alert    Patient seen and discussed with Dr. Brain Lopez.  Assessment and plan as above.    Lennie Hammer PA-C  Critical Care Cardiology  Pager: [phone]      Interval History:  No acute events overnight.  Denies chest pain at present.  Mild pain to IABP site with palpation.  Denies fever, chills, nausea, abdominal pain, new LE pain/swelling.      Objective Findings:  Temp:  [98.2  F (36.8  C)-99.3  F (37.4  C)] 98.2  F (36.8  C)  Pulse:  [] 90  Resp:  [9-24] 16  BP: ()/(38-99)  141/85  SpO2:  [95 %-100 %] 98 %    I/O:  Intake/Output Summary (Last 24 hours) at 7/13/2021 0900  Last data filed at 7/13/2021 0900  Gross per 24 hour   Intake 667.44 ml   Output 1255 ml   Net -587.56 ml       Physical Exam:  GEN: alert and oriented  HEENT: no appreciable cranial trauma  Pulm: CTAB  Cardiac: regular rate/rhythm. No murmur, rub, gallop. audible IABP  GI: soft, non distended  Neuro: equal  strength, A and O x3  Integument: no appreciable skin breakdown  Vascular: LE warm, well perfused    Medications:    aspirin  81 mg Oral Daily     carvedilol  3.125 mg Oral BID w/meals     empagliflozin  25 mg Oral Daily     lamiVUDine  100 mg Oral Daily     mycophenolate  750 mg Oral BID     pantoprazole  40 mg Oral QAM AC     polyethylene glycol  17 g Oral Daily     predniSONE  5 mg Oral Daily     rosuvastatin  5 mg Oral Daily     senna-docusate  1 tablet Oral BID    Or     senna-docusate  2 tablet Oral BID     sodium chloride (PF)  3 mL Intracatheter Q8H       heparin 1,150 Units/hr (07/13/21 0343)     - MEDICATION INSTRUCTIONS -       Reason anticoagulation order not selected       - MEDICATION INSTRUCTIONS -       sodium chloride           Labs:   CMP  Recent Labs   Lab 07/13/21  0404 07/13/21  0401 07/13/21  0141 07/12/21  2340 07/12/21  1036     --   --   --  138   POTASSIUM 3.8  --   --   --  4.6   CHLORIDE 109  --   --   --  107   CO2 24  --   --   --  25   ANIONGAP 7  --   --   --  6   * 158* 136* 176* 142*   BUN 31*  --   --   --  33*   CR 1.45*  --   --   --  1.55*   GFRESTIMATED 53*  --   --   --  49*   DEBORAH 8.7  --   --   --  9.4   MAG  --   --   --   --  2.2   PROTTOTAL  --   --   --   --  7.4   ALBUMIN  --   --   --   --  4.1   BILITOTAL  --   --   --   --  0.7   ALKPHOS  --   --   --   --  100   AST  --   --   --   --  8   ALT  --   --   --   --  20     CBC  Recent Labs   Lab 07/13/21  0404 07/12/21  2343 07/12/21  1036   WBC 7.0  --  5.9   RBC 2.82*  --  2.10*   HGB 8.5* 8.0* 6.3*    HCT 26.1*  --  20.4*   MCV 93  --  97   MCH 30.1  --  30.0   MCHC 32.6  --  30.9*   RDW 16.2*  --  15.2*   *  --  126*       Pertinent Imaging Studies:  Coronary angiogram:   Severe distal Left main disease at trifurcation of LCx, LAD, and Ramus.  IABP inserted in the RFA.  CVC inserted in the RIJ.    Echo: pending      Lennie Hammer PA-C  Critical Care Cardiology  Pager: [phone]    Physician Attestation   I, Brain Lopez, saw and evaluated Frandy Workman as part of a shared visit.  I have reviewed and discussed with the advanced practice provider their history, physical and plan.    I personally reviewed the vital signs, medications, labs and imaging.    My key history or physical exam findings:     - echocardiogram today  - add sliding scale insulin  - hematology consult        Brain Lopez

## 2021-07-13 NOTE — ANESTHESIA PREPROCEDURE EVALUATION
Anesthesia Pre-Procedure Evaluation    Patient: Frandy Workman   MRN: [id number] : 1965        Preoperative Diagnosis: CAD (coronary artery disease) [I25.10]   Procedure : Procedure(s):  CORONARY ARTERY BYPASS GRAFT (CABG) and ALL INDICTATED PROCEDURES     Past Medical History:   Diagnosis Date     Anemia      Arthritis      BPH (benign prostatic hyperplasia)      CAD (coronary artery disease) 2019     Cholelithiasis      Conductive hearing loss 2017     Depressive disorder 1986    Suffer effects throughout life     Gastroesophageal reflux disease 2014     HCC (hepatocellular carcinoma) (H) 2019     History of diabetic retinopathy 2018     HTN (hypertension)      HTN (hypertension) 2019     Hyperlipidemia      Liver cirrhosis secondary to ESTRADA (H)      Liver transplanted (H) 2019     Portal vein thrombosis 2019     Type II diabetes mellitus (H)       Past Surgical History:   Procedure Laterality Date     COLONOSCOPY           COLONOSCOPY N/A 2019    Procedure: COLONOSCOPY, WITH POLYPECTOMY AND BIOPSY;  Surgeon: Adam Morton MD;  Location:  GI     CV CENTRAL VENOUS CATHETER PLACEMENT N/A 2021    Procedure: Central Venous Catheter Placement;  Surgeon: Fermin Polanco MD;  Location:  HEART CARDIAC CATH LAB     CV CORONARY ANGIOGRAM N/A 2021    Procedure: Coronary Angiogram;  Surgeon: Fermin Polanco MD;  Location:  HEART CARDIAC CATH LAB     CV HEART CATHETERIZATION WITH POSSIBLE INTERVENTION N/A 2019    Procedure: CORS;  Surgeon: Jagdish Hoyt MD;  Location:  HEART CARDIAC CATH LAB     CV INTRA AORTIC BALLOON N/A 2021    Procedure: Intra Aortic Balloon Pump Insertion;  Surgeon: Fermin Polanco MD;  Location:  HEART CARDIAC CATH LAB     ESOPHAGOSCOPY, GASTROSCOPY, DUODENOSCOPY (EGD), COMBINED N/A 2016    Procedure: COMBINED ESOPHAGOSCOPY, GASTROSCOPY, DUODENOSCOPY  (EGD);  Surgeon: Santi Rosas MD;  Location: [facility] GI     ESOPHAGOSCOPY, GASTROSCOPY, DUODENOSCOPY (EGD), COMBINED N/A 2017    Procedure: COMBINED ESOPHAGOSCOPY, GASTROSCOPY, DUODENOSCOPY (EGD);  EGD;  Surgeon: Santi Rosas MD;  Location:  GI     ESOPHAGOSCOPY, GASTROSCOPY, DUODENOSCOPY (EGD), COMBINED N/A 2018    Procedure: EGD;  Surgeon: Santi Rosas MD;  Location: [facility]     ESOPHAGOSCOPY, GASTROSCOPY, DUODENOSCOPY (EGD), COMBINED N/A 2019    Procedure: ESOPHAGOGASTRODUODENOSCOPY, WITH BIOPSY;  Surgeon: Adam Morton MD;  Location:  GI     ESOPHAGOSCOPY, GASTROSCOPY, DUODENOSCOPY (EGD), COMBINED N/A 2020    Procedure: ESOPHAGOGASTRODUODENOSCOPY (EGD);  Surgeon: Santi Rosas MD;  [facility]     HEAD & NECK SURGERY      2017 at [facility].      IMPLANT GOLD WEIGHT EYELID Right 2017    Procedure: IMPLANT WEIGHT EYELID;  Right Upper Eyelid Weight, right tarsal strip lower eyelid;  Surgeon: Milana Malave MD;  Location: [facility] OR     IR CHEMO EMBOLIZATION  2019     KNEE SURGERY Left      ORTHOPEDIC SURGERY       PAROTIDECTOMY, RADICAL NECK DISSECTION Right 2017    Procedure: PAROTIDECTOMY, RADICAL NECK DISSECTION;  Right Superfacial Parotidectomy , Facial nerve repair. with [facility] facial nerve monitor.;  Surgeon: Asiya Morgan MD;  Location: [facility] OR     PICC INSERTION Left 2017    4fr SL BioFlo PICC, 44cm in the L basilic vein w/ tip in the low SVC     RETURN LIVER TRANSPLANT N/A 2019    Procedure: Exploratory laparotomy, hematoma evacuation, abdominal washout;  Surgeon: Александр Ramos MD;  Location: [facility] OR     TRANSPLANT LIVER RECIPIENT  DONOR N/A 2019    Procedure: TRANSPLANT, LIVER, RECIPIENT,  DONOR;  Surgeon: Александр Ramos MD;  Location: [facility] OR     VASCULAR SURGERY        Allergies   Allergen Reactions     Codeine Other (See Comments)     Cannot take due to liver  Cannot tolerate oral  narcotics     Seasonal Allergies      Sneezing, coughing, runny and itchy eyes      Social History     Tobacco Use     Smoking status: Former Smoker     Packs/day: 6.00     Years: 30.00     Pack years: 180.00     Types: Cigars     Start date: 5/1/2016     Quit date: 10/25/2017     Years since quitting: 3.7     Smokeless tobacco: Former User     Types: Chew     Quit date: 10/31/2017     Tobacco comment: 1 tin per week   Substance Use Topics     Alcohol use: No     Alcohol/week: 0.0 standard drinks     Comment: quit Sept. 1996      Wt Readings from Last 1 Encounters:   07/13/21 81 kg (178 lb 9.2 oz)        Anesthesia Evaluation            ROS/MED HX  ENT/Pulmonary:     (+) tobacco use, Past use,     Neurologic:       Cardiovascular:     (+) hypertension--CAD angina---    METS/Exercise Tolerance:     Hematologic:     (+) anemia,     Musculoskeletal:       GI/Hepatic: Comment: HCC and ESTRADA cirrhosis s/p liver transplant 11/16  Duodenal ulcers      Renal/Genitourinary:     (+) renal disease, type: CRI,     Endo:     (+) type II DM, Diabetic complications: nephropathy retinopathy. Chronic steroid usage for     Psychiatric/Substance Use:     (+) psychiatric history bipolar     Infectious Disease:       Malignancy:       Other:            Physical Exam    Airway        Mallampati: II   TM distance: > 3 FB   Neck ROM: full     Respiratory Devices and Support         Dental  no notable dental history         Cardiovascular          Rhythm and rate: regular     Pulmonary           (+) decreased breath sounds           OUTSIDE LABS:  CBC:   Lab Results   Component Value Date    WBC 7.0 07/13/2021    WBC 5.9 07/12/2021    HGB 8.7 (L) 07/13/2021    HGB 8.5 (L) 07/13/2021    HCT 26.1 (L) 07/13/2021    HCT 20.4 (L) 07/12/2021     (L) 07/13/2021     (L) 07/12/2021     BMP:   Lab Results   Component Value Date     07/13/2021     07/12/2021    POTASSIUM 3.8 07/13/2021    POTASSIUM 4.6 07/12/2021    CHLORIDE  109 07/13/2021    CHLORIDE 107 07/12/2021    CO2 24 07/13/2021    CO2 25 07/12/2021    BUN 31 (H) 07/13/2021    BUN 33 (H) 07/12/2021    CR 1.45 (H) 07/13/2021    CR 1.55 (H) 07/12/2021     (H) 07/13/2021     (H) 07/13/2021     COAGS:   Lab Results   Component Value Date    PTT 39 (H) 11/12/2019    INR 0.96 07/12/2021    FIBR 260 11/14/2019     POC:   Lab Results   Component Value Date     (H) 06/26/2020     HEPATIC:   Lab Results   Component Value Date    ALBUMIN 4.1 07/12/2021    PROTTOTAL 7.4 07/12/2021    ALT 20 07/12/2021    AST 8 07/12/2021    ALKPHOS 100 07/12/2021    BILITOTAL 0.7 07/12/2021    JAMARI 31 11/15/2019     OTHER:   Lab Results   Component Value Date    PH 7.37 11/12/2019    LACT 0.4 (L) 12/06/2019    A1C 6.8 (H) 07/13/2021    DEBORAH 8.7 07/13/2021    PHOS 4.0 02/26/2021    MAG 2.2 07/12/2021    LIPASE 70 (L) 12/05/2019    AMYLASE 26 (L) 11/12/2019    TSH 0.91 01/28/2021    T4 1.21 08/08/2018       Anesthesia Plan    ASA Status:  4      Anesthesia Type: General.     - Airway: ETT   Induction: Intravenous.   Maintenance: Balanced.   Techniques and Equipment:     - Lines/Monitors: 2nd IV, Arterial Line, Central Line, PAC, BEN, BIS, NIRS            BEN Absolute Contra-indication: NONE            BEN Relative Contra-indication: NONE     - Drips/Meds: Ketamine, Norepi, Epinephrine, Phenylephrine, Sufentanil     Consents    Anesthesia Plan(s) and associated risks, benefits, and realistic alternatives discussed. Questions answered and patient/representative(s) expressed understanding.     - Discussed with:  Patient      - Extended Intubation/Ventilatory Support Discussed: Yes.      - Patient is DNR/DNI Status: No    Use of blood products discussed: Yes.     - Discussed with: Patient.     - Consented: consented to blood products            Reason for refusal: other.     Postoperative Care    Pain management: IV analgesics, Multi-modal analgesia.   PONV prophylaxis: Ondansetron (or other  5HT-3)     Comments:                Sonny Sanches MD

## 2021-07-13 NOTE — CONSULTS
"[facility]    Hematology Consult Note  Patient Name: Frandy Workman   MRN: [id number]  YOB: 1965    Date of Service: July 13, 2021  Admission Date: 7/12/2021  Hospital Day # 1      Reason for Consult: anemia, unclear etiology.  likely upcoming CABG      Assessment & Plan   Frandy Workman is a 56 year old male who presents with PMH of CAD with non-occlusive LM disease, HTN, HLD, T2DM, chronic anemia, CKD III, ESTRADA cirrohsis s/p liver transplant 11/2019, hx of asxs HIV possible HCC s/p TACE, Bipolar I, MDD, h/o gastric ulcers w/o CMV or H Pylori, and BPH who presents on 7/12/2021 with NSTEMI.    Etiology of anemia likely multifactorial resulting from chronic immunosuppressants with transplant history, splenomegaly likely secondary to liver disease, and anemia in the setting of CKD. He is currently being followed in nephrology clinic and with anemia clinic for management. He is only aranesp every 2 weeks. Currently there is no acute indication to for additional work up and would continue to support with blood transfusions as indicated.     Hematology/Oncology Problem list:   # Normocytic Anemia  # Mild Thrombocytopenia    Summary of Recommendations:  - Follow up with primary nephrologist and anemia clinic on discharge.  - Continue to support with transfusions as indicated.    Thank you for involving us in the care of this patient. Hematology will sign off.     Patient was seen and plan of care developed with Dr. Sahu.    Carey Lilly MD MS  Hematology Fellow      ______________________________________________________________________      History of Present Illness   Adapted from admissions H&P  \"Frandy Workman is a 56 year old male who presents with PMH of CAD with non-occlusive LM disease, HTN, HLD, T2DM, chronic anemia, CKD III, ESTRADA cirrohsis s/p liver transplant 11/2019, hx of asxs HIV possible HCC s/p TACE, Bipolar I, MDD, h/o gastric " "ulcers w/o CMV or H Pylori, and BPH who presents with NSTEMI with finding of 90% LM disease. S/p IABP. CVTS eval pending.     Patient reports he began to experience a midsternal chest pain starting on Friday that was severe in intensity in the evening. His pain improved but have persistent pain throughout Saturday and Sunday before presenting to the ED today on 7/13 after chest pain worsened again. He was noted to have significant ECG changes with diffuse ST depressions and was taken to the Cath Lab noted to have distal left main disease.     At baseline patient used to be a functional person, 6 months ago he can walk several miles and go up more than 2 flight of stairs without any distress. Recently his functional capacity has been limited by anemia, reports he is primarily sedentary as of the past several weeks. Patient presented with a hemoglobin of 6.3 for which she has received 2 units of blood.     Patient denies any chest pain at this time. Reports at the time of his onset of chest pain on Friday he developed new onset of orthopnea. Denies any PND or lower extremity edema. No prior history of heart failure symptoms. No palpitations. No diaphoresis.     Social history - Estranged from most of his family members. Has 1 daughter.  Used to work as a finance .  Former chewing tobacco use starting at the age of 15, quit 6 years ago. Denies significant tobacco use, reports intermittent cigar smoking once every few months throughout adult life. No significant history of alcohol use. Never used any illicit drugs.\"      Review of Systems    The 10 point Review of Systems is negative other than noted in the HPI .    Past Medical History    Past Medical History:   Diagnosis Date     Anemia 2013     Arthritis      BPH (benign prostatic hyperplasia)      CAD (coronary artery disease) 4/1/2019     Cholelithiasis      Conductive hearing loss 08/16/2017     Depressive disorder 1986    Suffer effects throughout life     " Gastroesophageal reflux disease 12/01/2014     HCC (hepatocellular carcinoma) (H) 1/22/2019     History of diabetic retinopathy 07/2018     HTN (hypertension)      HTN (hypertension) 11/20/2019     Hyperlipidemia      Liver cirrhosis secondary to ESTRADA (H)      Liver transplanted (H) 11/11/2019     Portal vein thrombosis 4/11/2019     Type II diabetes mellitus (H)        Past Surgical History   Past Surgical History:   Procedure Laterality Date     COLONOSCOPY      2015     COLONOSCOPY N/A 12/6/2019    Procedure: COLONOSCOPY, WITH POLYPECTOMY AND BIOPSY;  Surgeon: Adam Morton MD;  Location:  GI     CV CENTRAL VENOUS CATHETER PLACEMENT N/A 7/12/2021    Procedure: Central Venous Catheter Placement;  Surgeon: Fermin Polanco MD;  Location:  HEART CARDIAC CATH LAB     CV CORONARY ANGIOGRAM N/A 7/12/2021    Procedure: Coronary Angiogram;  Surgeon: Fermin Polanco MD;  Location:  HEART CARDIAC CATH LAB     CV HEART CATHETERIZATION WITH POSSIBLE INTERVENTION N/A 2/26/2019    Procedure: CORS;  Surgeon: Jagdish Hoyt MD;  Location:  HEART CARDIAC CATH LAB     CV INTRA AORTIC BALLOON N/A 7/12/2021    Procedure: Intra Aortic Balloon Pump Insertion;  Surgeon: Fermin Polanco MD;  Location: [facility] CARDIAC CATH LAB     ESOPHAGOSCOPY, GASTROSCOPY, DUODENOSCOPY (EGD), COMBINED N/A 11/17/2016    Procedure: COMBINED ESOPHAGOSCOPY, GASTROSCOPY, DUODENOSCOPY (EGD);  Surgeon: Santi Rosas MD;  Location:  GI     ESOPHAGOSCOPY, GASTROSCOPY, DUODENOSCOPY (EGD), COMBINED N/A 11/17/2017    Procedure: COMBINED ESOPHAGOSCOPY, GASTROSCOPY, DUODENOSCOPY (EGD);  EGD;  Surgeon: Santi Rosas MD;  Location:  GI     ESOPHAGOSCOPY, GASTROSCOPY, DUODENOSCOPY (EGD), COMBINED N/A 12/28/2018    Procedure: EGD;  Surgeon: Santi Rosas MD;  Location:  OR     ESOPHAGOSCOPY, GASTROSCOPY, DUODENOSCOPY (EGD), COMBINED N/A 12/6/2019    Procedure:  ESOPHAGOGASTRODUODENOSCOPY, WITH BIOPSY;  Surgeon: Adam Morton MD;  Location:  GI     ESOPHAGOSCOPY, GASTROSCOPY, DUODENOSCOPY (EGD), COMBINED N/A 2020    Procedure: ESOPHAGOGASTRODUODENOSCOPY (EGD);  Surgeon: Santi Rosas MD;  Location:  GI     HEAD & NECK SURGERY      2017 at [facility].      IMPLANT GOLD WEIGHT EYELID Right 2017    Procedure: IMPLANT WEIGHT EYELID;  Right Upper Eyelid Weight, right tarsal strip lower eyelid;  Surgeon: Milana Malave MD;  [facility]     IR CHEMO EMBOLIZATION  2019     KNEE SURGERY Left      ORTHOPEDIC SURGERY       PAROTIDECTOMY, RADICAL NECK DISSECTION Right 2017    Procedure: PAROTIDECTOMY, RADICAL NECK DISSECTION;  Right Superfacial Parotidectomy , Facial nerve repair. with [facility] facial nerve monitor.;  Surgeon: Asiya Morgan MD;  [facility] OR     PICC INSERTION Left 2017    4fr SL BioFlo PICC, 44cm in the L basilic vein w/ tip in the low SVC     RETURN LIVER TRANSPLANT N/A 2019    Procedure: Exploratory laparotomy, hematoma evacuation, abdominal washout;  Surgeon: Александр Ramos MD;  [facility] OR     TRANSPLANT LIVER RECIPIENT  DONOR N/A 2019    Procedure: TRANSPLANT, LIVER, RECIPIENT,  DONOR;  Surgeon: Александр Ramos MD;  Location:  OR     VASCULAR SURGERY         Social History   Social History     Tobacco Use     Smoking status: Former Smoker     Packs/day: 6.00     Years: 30.00     Pack years: 180.00     Types: Cigars     Start date: 2016     Quit date: 10/25/2017     Years since quitting: 3.7     Smokeless tobacco: Former User     Types: Chew     Quit date: 10/31/2017     Tobacco comment: 1 tin per week   Substance Use Topics     Alcohol use: No     Alcohol/week: 0.0 standard drinks     Comment: quit 1996     Drug use: No       Family History   Family History   Problem Relation Age of Onset     Prostate Cancer Maternal Grandfather      Substance Abuse Maternal  Grandfather         Alcohol     Colon Cancer Father 60     Pancreatic Cancer Father 60     Prostate Cancer Father      Colorectal Cancer Father      Macular Degeneration Father      Cancer Father      Glaucoma Father      Skin Cancer Father      Colorectal Cancer Maternal Grandmother      Cancer Maternal Grandmother      Substance Abuse Maternal Grandmother         Alcohol     Colorectal Cancer Paternal Grandmother      Cancer Mother      Diabetes Mother          3/2016     Cerebrovascular Disease Mother         Passed away in Feb of this year, 80 years old.     Thyroid Disease Mother      Depression Mother      Asthma Sister         Had since birth     Thyroid Disease Sister      Depression Sister      Liver Disease No family hx of      Melanoma No family hx of        Prior to Admission Medications   Prior to Admission Medications   Prescriptions Last Dose Informant Patient Reported? Taking?   ARIPiprazole (ABILIFY) 5 MG tablet   Yes No   Sig: daily   Acetaminophen (TYLENOL) 325 MG CAPS   No No   Sig: Take 325-650 mg by mouth every 4 hours as needed (pain, fever)   Artificial Tear Solution (SM ARTIFICIAL TEARS) SOLN   No No   Sig: Place 1 drop into the right eye every hour as needed (dry eyes) Apply at least 4 times daily and as needed for dry eye   BD VIKTORIA U/F 32G X 4 MM insulin pen needle   No No   Sig: Use 5 per day   Continuous Blood Gluc  (FREESTYLE CLAUDIA 14 DAY READER) CECILIO   No No   Sig: Use to read blood sugars as per 's instructions.   Continuous Blood Gluc Sensor (FREESTYLE CLAUDIA 14 DAY SENSOR) [facility]   No No   Sig: Change every 14 days.   Multiple Vitamin (THERAVITE PO)   Yes No   Sig: Take 1 tablet by mouth every morning    aspirin 81 MG EC tablet   No No   Sig: Take 1 tablet (81 mg) by mouth daily   carvedilol (COREG) 3.125 MG tablet   No No   Sig: Take 1 tablet (3.125 mg) by mouth 2 times daily (with meals)   ciclopirox (LOPROX) 0.77 % cream   No No   Sig: Apply topically 2  times daily To feet and toenails.   empagliflozin (JARDIANCE) 25 MG TABS tablet   No No   Sig: Take 1 tablet (25 mg) by mouth daily   glucose (BD GLUCOSE) 4 g chewable tablet   No No   Sig: Take 1 tablet by mouth every hour as needed for low blood sugar   insulin aspart (NOVOLOG PEN) 100 UNIT/ML pen   No No   Sig: Inject 1-15 Units Subcutaneous 3 times daily (with meals)   insulin degludec (TRESIBA FLEXTOUCH) 100 UNIT/ML pen   No No   Sig: Inject 27 units subcutaneously daily   lamiVUDine (EPIVIR) 100 MG tablet   No No   Sig: Take 1 tablet (100 mg) by mouth daily   mycophenolate (GENERIC EQUIVALENT) 250 MG capsule   No No   Sig: Take 3 capsules (750 mg) by mouth every 12 hours   order for DME   No No   Si Device by Device route daily Knee high compression socks 15-20 mmhg.   pantoprazole (PROTONIX) 40 MG EC tablet   No No   Sig: TAKE ONE TABLET BY MOUTH EVERY MORNING BEFORE BREAKFAST   polyethylene glycol (MIRALAX) 17 g packet   Yes No   Sig: Take 1 packet by mouth daily   predniSONE (DELTASONE) 5 MG tablet   No No   Sig: Take 1 tablet (5 mg) by mouth daily   rosuvastatin (CRESTOR) 5 MG tablet   No No   Sig: Take 1 tablet (5 mg) by mouth daily   tamsulosin (FLOMAX) 0.4 MG capsule   No No   Sig: Take 1 capsule (0.4 mg) by mouth daily   vitamin B-12 (CYANOCOBALAMIN) 1000 MCG tablet   No No   Sig: Take 1 tablet (1,000 mcg) by mouth daily   vitamin D3 (CHOLECALCIFEROL) 50 mcg (2000 units) tablet   No No   Sig: Take 1 tablet (50 mcg) by mouth daily      Facility-Administered Medications Last Administration Doses Remaining   aflibercept (EYLEA) injection prefilled syringe 2 mg 2021 10:33 AM 10   aflibercept (EYLEA) injection prefilled syringe 2 mg 2021 10:33 AM 11          Allergies      Allergies   Allergen Reactions     Codeine Other (See Comments)     Cannot take due to liver  Cannot tolerate oral narcotics     Seasonal Allergies      Sneezing, coughing, runny and itchy eyes       Physical Exam   Vital  Signs: Temp: 99  F (37.2  C) Temp src: Oral BP: (!) 141/48 Pulse: 89   Resp: 18 SpO2: 97 % O2 Device: None (Room air)    Weight: 178 lbs 9.16 oz      GENERAL: Alert, interactive, NAD  HEENT: AT/NC, sclera anicteric,  EOMI  RESP: Non labored breathing on RA  CARDIAC:on telemetry  ABDOMEN: Soft, nontender, nondistended. +BS, no HSM or masses, no rebound or guarding.  EXTREMITIES: moving upper extremities equally and independently  SKIN: Warm and dry, no jaundice or rash on exposed skin  NEURO: speech fluent, mentation intact  PSYCH:  appropriate mood, normal affect, linear thought.       Data     Results for orders placed or performed during the hospital encounter of 07/12/21 (from the past 24 hour(s))   Prepare red blood cells (unit)   Result Value Ref Range    CROSSMATCH Compatible     UNIT ABO/RH O Pos     Unit Number [id number]     UNIT STATUS Issued     Blood Component Type Red Blood Cells     Product Code D2860X96     CODING SYSTEM [id number]     UNIT TYPE ISBT 5100     ISSUE DATE AND TIME [id number]    Prepare red blood cells (unit)   Result Value Ref Range    CROSSMATCH Compatible     UNIT ABO/RH O Pos     Unit Number [id number]     UNIT STATUS Issued     Blood Component Type Red Blood Cells     Product Code [id number]     CODING SYSTEM [id number]     UNIT TYPE ISBT 5100     ISSUE DATE AND TIME [id number]    [place] Draw *Canceled*    Narrative    The following orders were created for panel order [place] Draw.  Procedure                               Abnormality         Status                     ---------                               -----------         ------                       Please view results for these tests on the individual orders.   Troponin I   Result Value Ref Range    Troponin I 0.120 (H) 0.000 - 0.045 ug/L   INR   Result Value Ref Range    INR 0.96 0.85 - 1.15   ABO/Rh type and screen    Narrative    The following orders were created for panel order ABO/Rh type and screen.  Procedure                                Abnormality         Status                     ---------                               -----------         ------                     Adult Type and Screen[[id number]]                            Final result                 Please view results for these tests on the individual orders.   [place] Draw    Narrative    The following orders were created for panel order [place] Draw.  Procedure                               Abnormality         Status                     ---------                               -----------         ------                     Extra Red Top Tube[[id number]]                               Final result               Extra Green Top (Lithium...[[id number]]                      Final result                 Please view results for these tests on the individual orders.   Adult Type and Screen   Result Value Ref Range    ABO/RH(D) O POS     Antibody Screen Negative Negative    SPECIMEN EXPIRATION DATE [id number]    Extra Red Top Tube   Result Value Ref Range    Hold Specimen JIC    Extra Green Top (Lithium Heparin) Tube   Result Value Ref Range    Hold Specimen JIC    Ferritin   Result Value Ref Range    Ferritin 543 (H) 26 - 388 ng/mL   Lactate Dehydrogenase   Result Value Ref Range    Lactate Dehydrogenase 186 85 - 227 U/L   EKG 12-lead, tracing only   Result Value Ref Range    Systolic Blood Pressure  mmHg    Diastolic Blood Pressure  mmHg    Ventricular Rate 120 BPM    Atrial Rate 120 BPM    SC Interval 136 ms    QRS Duration 78 ms     ms    QTc 449 ms    P Axis 60 degrees    R AXIS -6 degrees    T Axis 206 degrees    Interpretation ECG Click View Image link to view waveform and result    Asymptomatic COVID-19 Virus (Coronavirus) by PCR Nasopharyngeal    Specimen: Nasopharyngeal; Swab    Narrative    The following orders were created for panel order Asymptomatic COVID-19 Virus (Coronavirus) by PCR Nasopharyngeal.  Procedure                                Abnormality         Status                     ---------                               -----------         ------                     SARS-COV2 (COVID-19) Vir...[[id number]]  Normal              Final result                 Please view results for these tests on the individual orders.   SARS-COV2 (COVID-19) Virus RT-PCR    Specimen: Nasopharyngeal; Swab   Result Value Ref Range    SARS CoV2 PCR Negative Negative    Narrative    Testing was performed using the Tip or Skipert Xpress SARS-CoV-2 Assay on the  Cepheid Gene-Xpert Instrument Systems. Additional information about  this Emergency Use Authorization (EUA) assay can be found via the Lab  Guide. This test should be ordered for the detection of SARS-CoV-2 in  individuals who meet SARS-CoV-2 clinical and/or epidemiological  criteria. Test performance is unknown in asymptomatic patients. This  test is for in vitro diagnostic use under the FDA EUA for  laboratories certified under CLIA to perform high complexity testing.  This test has not been FDA cleared or approved. A negative result  does not rule out the presence of PCR inhibitors in the specimen or  target RNA in concentration below the limit of detection for the  assay. The possibility of a false negative should be considered if  the patient's recent exposure or clinical presentation suggests  COVID-19. This test was validated by the [facility]. This laboratory is certified under  the Clinical Laboratory Improvement Amendments of 1988 (CLIA-88) as  qualified to perform high complexity laboratory testing.     Cardiac Catheterization    Narrative    Severe distal Left main disease at trifurcation of LCx, LAD, and Ramus.  IABP inserted in the RFA.  CVC inserted in the RIJ.     Glucose by meter   Result Value Ref Range    GLUCOSE BY METER POCT 120 (H) 70 - 99 mg/dL   XR Chest Port 1 View    Narrative    EXAM: XR CHEST PORT 1 VIEW 7/12/2021 10:05 PM      HISTORY: Confirm  placement of the IABP.    COMPARISON: CT CAP 3/26/2021.     TECHNIQUE: Frontal view of the chest.    FINDINGS: Right IJ central venous catheter tip projects over the high  right atrium near the cavoatrial junction. Unremarkable soft tissues  and no acute bony abnormalities. Cardiomediastinal borders are clear.  No enlargement of the cardiac silhouette. No appreciable pneumothorax  or pleural effusions. Streaky bibasilar opacities. Minimal, vascular  congestion. Intra-aortic balloon pump marker projects at the level of  the jenifer..      Impression    IMPRESSION:   1. Intra-aortic balloon pump marker projects over the level of the  jenifer. No acute pulmonary findings.   2. Streaky opacities in the bases are compatible with atelectasis,  infection, or aspiration.    I have personally reviewed the examination and initial interpretation  and I agree with the findings.    SOPHIA HERNADEZ MD         SYSTEM ID:  [id number]   Hemoglobin   Result Value Ref Range    Hemoglobin 8.0 (L) 13.3 - 17.7 g/dL   Cardiothoracic Surgery Adult IP Consult: Patient to be seen: Routine within 24 hrs; Call back #: [phone]; CAB evaluation in the setting of LM disease; Consultant may enter orders: No; Requesting provider? Attending physician    Narrative    Hugo Zamora PA-C     7/13/2021  8:52 AM  Cardiovascular and Thoracic Surgery Consultation      Frandy Workman MRN# [id number]   YOB: 1965 Age: 56 year old   Date of Admission: 7/12/2021     Reason for consult: NSTEMI, Coronary artery disease with left   main disease and IABP           Assessment and Plan:   Will d/w Dr Mallory (CT surgeon).  Noted coronary angiogram with 90% LM, 60% proximal LAD, mild   diffuse disease throughout the RCA.  Echo done but not read.  Currently on heparin gtt  +IABP  Hx of chronic anemia (Hgb 6.3 on admission)  CKD stage 3 (baseline Cr 1.5-1.7)  Cirrhosis s/p liver transplant ( mg bid, prednisone 5 mg)  Chronic steroid usage  Treatment  "for Hep B  Will place orders for carotid US and LE US.  IABP being adjusted by cardiology.  Will have hematology review/assess pt.          Chief Complaint:   Chest pain and L arm pain intermittently for last few days.    History is obtained from the patient    History of Present Illness:     Pt is a 56 year old male who presented to the emergency room with   chest pain and left arm pain intermittently for the last few   days. States that this started on the evening of Friday, July 9, 2021. States that at around 2000 on 7/9 he went done to get his   meds (lives in a 3 story house) and when he came up a pain   started in his L arm that he describes as sharp. Describes it as   severe in intensity. He was unable to lie down at that time and   it did progress to the left side of his chest. Started from front   to back and describes it as \"blips.\") At that time he did check   his blood pressure and noted that it was 140/120. Let it come   down to 120/70. At the time his BP was elevated he did not some   sob. Denies any lightheadedness or dizziness. Denies any swelling   in his lower extremities. Was able to get some sleep that night.   He did have some improvement over the weekend but continued to be   persistent. In the am he awoke with tightness in his arm.   symptoms were mild over the weekend coming and going. He   presented to the ED on Monday when the chest pain worsened.     Pt does live alone. He does have his groceries delivered to his   house. Has a friend that drives him around (who he states was   homeless before but now has a place). Does not use a walker or   cane except in the winter to prevent falls.     His DM type II is well controlled. Being treated for Hep B   exposure. Hx of chronic anemia. Has a hx of liver transplant. He   also has a hx of hypertension, CKD stage 3 with a baseline of   1.5-1.7 and hyperlipidemia. Pt states that he is compliant with   medications.            Past Medical History: "     Past Medical History:   Diagnosis Date     Anemia 2013     Arthritis      BPH (benign prostatic hyperplasia)      CAD (coronary artery disease) 4/1/2019     Cholelithiasis      Conductive hearing loss 08/16/2017     Depressive disorder 1986    Suffer effects throughout life     Gastroesophageal reflux disease 12/01/2014     HCC (hepatocellular carcinoma) (H) 1/22/2019     History of diabetic retinopathy 07/2018     HTN (hypertension)      HTN (hypertension) 11/20/2019     Hyperlipidemia      Liver cirrhosis secondary to ESTRADA (H)      Liver transplanted (H) 11/11/2019     Portal vein thrombosis 4/11/2019     Type II diabetes mellitus (H)              Past Surgical History:     Past Surgical History:   Procedure Laterality Date     COLONOSCOPY      2015     COLONOSCOPY N/A 12/6/2019    Procedure: COLONOSCOPY, WITH POLYPECTOMY AND BIOPSY;  Surgeon:   Adam Morton MD;  [facility]     CV HEART CATHETERIZATION WITH POSSIBLE INTERVENTION N/A   2/26/2019    Procedure: CORS;  Surgeon: Jagdish Hoyt MD;  Location:    HEART CARDIAC CATH LAB     ESOPHAGOSCOPY, GASTROSCOPY, DUODENOSCOPY (EGD), COMBINED N/A   11/17/2016    Procedure: COMBINED ESOPHAGOSCOPY, GASTROSCOPY, DUODENOSCOPY   (EGD);  Surgeon: Santi Rosas MD;  [facility]: [facility] GI     ESOPHAGOSCOPY, GASTROSCOPY, DUODENOSCOPY (EGD), COMBINED N/A   11/17/2017    Procedure: COMBINED ESOPHAGOSCOPY, GASTROSCOPY, DUODENOSCOPY   (EGD);  EGD;  Surgeon: Santi Rosas MD;  [facility]   GI     ESOPHAGOSCOPY, GASTROSCOPY, DUODENOSCOPY (EGD), COMBINED N/A   12/28/2018    Procedure: EGD;  Surgeon: Santi Rosas MD;  [facility]     ESOPHAGOSCOPY, GASTROSCOPY, DUODENOSCOPY (EGD), COMBINED N/A   12/6/2019    Procedure: ESOPHAGOGASTRODUODENOSCOPY, WITH BIOPSY;  Surgeon:   Adam Morton MD;  [facility] GI     ESOPHAGOSCOPY, GASTROSCOPY, DUODENOSCOPY (EGD), COMBINED N/A   2/13/2020    Procedure: ESOPHAGOGASTRODUODENOSCOPY  (EGD);  Surgeon: Santi Rosas MD;  [facility]     HEAD & NECK SURGERY      2017 at [facility].      IMPLANT GOLD WEIGHT EYELID Right 2017    Procedure: IMPLANT WEIGHT EYELID;  Right Upper Eyelid Weight,   right tarsal strip lower eyelid;  Surgeon: Milana Malave MD;  [facility]     IR CHEMO EMBOLIZATION  2019     KNEE SURGERY Left      ORTHOPEDIC SURGERY       PAROTIDECTOMY, RADICAL NECK DISSECTION Right 2017    Procedure: PAROTIDECTOMY, RADICAL NECK DISSECTION;  Right   Superfacial Parotidectomy , Facial nerve repair. with [facility] facial   nerve monitor.;  Surgeon: Asiya Morgan MD;  [facility]   OR     PICC INSERTION Left 2017    4fr SL BioFlo PICC, 44cm in the L basilic vein w/ tip in the low   SVC     RETURN LIVER TRANSPLANT N/A 2019    Procedure: Exploratory laparotomy, hematoma evacuation,   abdominal washout;  Surgeon: Александр Ramos MD;  Location: [facility]   OR     TRANSPLANT LIVER RECIPIENT  DONOR N/A 2019    Procedure: TRANSPLANT, LIVER, RECIPIENT,  DONOR;    Surgeon: Александр Ramos MD;  Location: [facility] OR     VASCULAR SURGERY                 Social History:     Social History     Tobacco Use     Smoking status: Former Smoker     Packs/day: 6.00     Years: 30.00     Pack years: 180.00     Types: Cigars     Start date: 2016     Quit date: 10/25/2017     Years since quitting: 3.7     Smokeless tobacco: Former User     Types: Chew     Quit date: 10/31/2017     Tobacco comment: 1 tin per week   Substance Use Topics     Alcohol use: No     Alcohol/week: 0.0 standard drinks     Comment: quit 1996             Family History:     Family History   Problem Relation Age of Onset     Prostate Cancer Maternal Grandfather      Substance Abuse Maternal Grandfather         Alcohol     Colon Cancer Father 60     Pancreatic Cancer Father 60     Prostate Cancer Father      Colorectal Cancer Father      Macular Degeneration Father       Cancer Father      Glaucoma Father      Skin Cancer Father      Colorectal Cancer Maternal Grandmother      Cancer Maternal Grandmother      Substance Abuse Maternal Grandmother         Alcohol     Colorectal Cancer Paternal Grandmother      Cancer Mother      Diabetes Mother          3/2016     Cerebrovascular Disease Mother         Passed away in Feb of this year, 80 years old.     Thyroid Disease Mother      Depression Mother      Asthma Sister         Had since birth     Thyroid Disease Sister      Depression Sister      Liver Disease No family hx of      Melanoma No family hx of              Immunizations:     Immunization History   Administered Date(s) Administered     COVID-19,PF,Pfizer 03/15/2021, 2021     Flu 65+ Years 2017     Flu, Unspecified 10/13/1994, 2015, 2016, 2019       HEPA 2015, 2016     HepB 2015, 2016, 2016     Influenza Vaccine IM > 6 months Valent IIV4 2018,   2020     Influenza Vaccine, 6+MO IM (QUADRIVALENT W/PRESERVATIVES)   2017, 2019     Mantoux Tuberculin Skin Test 2019     Pneumo Conj 13-V (2010&after) 10/01/2019     Pneumococcal 23 valent 2015     TDAP Vaccine (Boostrix) 2015     Zoster vaccine recombinant adjuvanted (SHINGRIX) 2020             Allergies:     Allergies   Allergen Reactions     Codeine Other (See Comments)     Cannot take due to liver  Cannot tolerate oral narcotics     Seasonal Allergies      Sneezing, coughing, runny and itchy eyes             Medications:     Facility-Administered Medications Prior to Admission   Medication Dose Route Frequency Provider Last Rate Last Admin     aflibercept (EYLEA) injection prefilled syringe 2 mg  2 mg   Intravitreal Q28 Days Enriqueta Martin MD   2 mg at   21 1033     aflibercept (EYLEA) injection prefilled syringe 2 mg  2 mg   Intravitreal Q28 Days Enriqueta Martin MD   2 mg at   21 1033      Medications Prior to Admission   Medication Sig Dispense Refill Last Dose     Acetaminophen (TYLENOL) 325 MG CAPS Take 325-650 mg by mouth   every 4 hours as needed (pain, fever) 100 capsule 1      ARIPiprazole (ABILIFY) 5 MG tablet daily        Artificial Tear Solution (SM ARTIFICIAL TEARS) SOLN Place 1   drop into the right eye every hour as needed (dry eyes) Apply at   least 4 times daily and as needed for dry eye 15 mL 1      aspirin 81 MG EC tablet Take 1 tablet (81 mg) by mouth daily 90   tablet 1      BD VIKTORIA U/F 32G X 4 MM insulin pen needle Use 5 per day 300   each 3      carvedilol (COREG) 3.125 MG tablet Take 1 tablet (3.125 mg) by   mouth 2 times daily (with meals) 60 tablet 5      ciclopirox (LOPROX) 0.77 % cream Apply topically 2 times daily   To feet and toenails. 90 g 5      Continuous Blood Gluc  (PrimeloopYLE CLAUDIA 14 DAY READER)   CECILIO Use to read blood sugars as per 's instructions.   1 each 0      Continuous Blood Gluc Sensor (Prompt.lySTYLE CLAUDIA 14 DAY SENSOR)   MISC Change every 14 days. 9 each 3      empagliflozin (JARDIANCE) 25 MG TABS tablet Take 1 tablet (25   mg) by mouth daily 90 tablet 1      glucose (BD GLUCOSE) 4 g chewable tablet Take 1 tablet by mouth   every hour as needed for low blood sugar 60 tablet 1      insulin aspart (NOVOLOG PEN) 100 UNIT/ML pen Inject 1-15 Units   Subcutaneous 3 times daily (with meals) 20 mL 3      insulin degludec (TRESIBA FLEXTOUCH) 100 UNIT/ML pen Inject 27   units subcutaneously daily 25 mL 3      lamiVUDine (EPIVIR) 100 MG tablet Take 1 tablet (100 mg) by   mouth daily 90 tablet 3      Multiple Vitamin (THERAVITE PO) Take 1 tablet by mouth every   morning         mycophenolate (GENERIC EQUIVALENT) 250 MG capsule Take 3   capsules (750 mg) by mouth every 12 hours 180 capsule 11      order for DME 1 Device by Device route daily Knee high   compression socks 15-20 mmhg. 1 Device 0      pantoprazole (PROTONIX) 40 MG EC tablet TAKE ONE  TABLET BY   MOUTH EVERY MORNING BEFORE BREAKFAST 90 tablet 3      polyethylene glycol (MIRALAX) 17 g packet Take 1 packet by   mouth daily        predniSONE (DELTASONE) 5 MG tablet Take 1 tablet (5 mg) by   mouth daily 90 tablet 3      rosuvastatin (CRESTOR) 5 MG tablet Take 1 tablet (5 mg) by   mouth daily 90 tablet 3      tamsulosin (FLOMAX) 0.4 MG capsule Take 1 capsule (0.4 mg) by   mouth daily 90 capsule 3      vitamin B-12 (CYANOCOBALAMIN) 1000 MCG tablet Take 1 tablet   (1,000 mcg) by mouth daily 30 tablet 11      vitamin D3 (CHOLECALCIFEROL) 50 mcg (2000 units) tablet Take 1   tablet (50 mcg) by mouth daily 90 tablet 3              Review of Systems:   The Review of Systems is negative other than noted in the HPI            Physical Exam:   Vitals were reviewed  Temp: 99  F (37.2  C) Temp src: Oral BP: (!) 157/62 Pulse: 89     Resp: 18 SpO2: 98 % O2 Device: None (Room air)    Constitutional:   awake, alert, cooperative, no apparent distress, and appears   older than stated age     Lungs:   No increased work of breathing, good air exchange, clear to   auscultation bilaterally, no crackles or wheezing     Cardiovascular:   RRR, telemetry SR 80s, -edema LE, +dorsalis pedis pulses 1+   bilaterally, IABP in place on R     Chest / Breast:   Breasts symmetrical, skin without lesion(s), no nipple   retraction or dimpling, no nipple discharge, no masses palpated,   no axillary or supraclavicular adenopathy         Abdomen:   BS+, NT/ND, soft     Musculoskeletal:   There is no redness, warmth, or swelling of the joints. Tone is   normal.     Neurologic:   Awake, alert, oriented to name, place and time.  Cranial nerves   II-XII are grossly intact.      Skin:   No rash or acne on exposed skin of chest.             Data:          Lab Results   Component Value Date     07/13/2021     06/28/2021    Lab Results   Component Value Date    CHLORIDE 109 07/13/2021    CHLORIDE 107 06/28/2021    Lab Results    Component Value Date    BUN 31 07/13/2021    BUN 33 06/28/2021      Lab Results   Component Value Date    POTASSIUM 3.8 07/13/2021    POTASSIUM 4.6 06/28/2021    Lab Results   Component Value Date    CO2 24 07/13/2021    CO2 25 06/28/2021    Lab Results   Component Value Date    CR 1.45 07/13/2021    CR 1.62 06/28/2021        Lab Results   Component Value Date    WBC 7.0 07/13/2021    HGB 8.5 (L) 07/13/2021    HCT 26.1 (L) 07/13/2021    MCV 93 07/13/2021     (L) 07/13/2021     Lab Results   Component Value Date    INR 0.96 07/12/2021     Lab Results   Component Value Date    TROPONIN 0.120 (H) 07/12/2021    TROPI <0.015 07/01/2020     Echo (7/13/21) - to be done today    Coronary angiogram (7/12/21) - severe distal LM disease at   trifurcation of LCX, LAD, ramus   Mid LM to Ost LAD lesion is 85% stenosed   1st diag lesion is 60% stenosed    Ramus is moderate in size   1st OM is moderate in size   Heparin Unfractionated Anti Xa Level   Result Value Ref Range    Anti Xa Unfractionated Heparin 0.91 For Reference Range, See Comment IU/mL    Narrative    Therapeutic Range: UFH: 0.25-0.50 IU/mL for low intensity dosing,  0.30-0.70 IU/mL for high intensity dosing DVT and PE.  This test is not validated for other direct factor X inhibitors (e.g. rivaroxaban, apixaban, edoxaban, betrixaban, fondaparinux) and should not be used for monitoring of other medications.   CBC with platelets   Result Value Ref Range    WBC Count 7.0 4.0 - 11.0 10e3/uL    RBC Count 2.82 (L) 4.40 - 5.90 10e6/uL    Hemoglobin 8.5 (L) 13.3 - 17.7 g/dL    Hematocrit 26.1 (L) 40.0 - 53.0 %    MCV 93 78 - 100 fL    MCH 30.1 26.5 - 33.0 pg    MCHC 32.6 31.5 - 36.5 g/dL    RDW 16.2 (H) 10.0 - 15.0 %    Platelet Count 123 (L) 150 - 450 10e3/uL   Basic metabolic panel   Result Value Ref Range    Sodium 140 133 - 144 mmol/L    Potassium 3.8 3.4 - 5.3 mmol/L    Chloride 109 94 - 109 mmol/L    Carbon Dioxide (CO2) 24 20 - 32 mmol/L    Anion Gap 7 3 - 14  mmol/L    Urea Nitrogen 31 (H) 7 - 30 mg/dL    Creatinine 1.45 (H) 0.66 - 1.25 mg/dL    Calcium 8.7 8.5 - 10.1 mg/dL    Glucose 162 (H) 70 - 99 mg/dL    GFR Estimate 53 (L) >60 mL/min/1.73m2   XR Chest Port 1 View    Narrative    XR CHEST PORT 1 VIEW  2021 7:52 AM      HISTORY: IABP placement    COMPARISON: Chest x-ray 2021.    FINDINGS:   Portable AP 20 degrees upright chest x-ray. Right internal jugular  central catheter tip terminates over the high right atrium.  Intra-aortic balloon pump tip terminates at the level of the jenifer.    Trachea is midline. Cardiac silhouette is within normal limits.  Pulmonary vasculature is indistinct. Improved aeration left lower lobe  with slightly increased hazy opacities in the right lower lobe. No  pleural effusion or pneumothorax.    No acute osseous abnormality. Visualized soft tissues and upper  abdomen are unremarkable.      Impression    IMPRESSION:   1. Infiltrative balloon pump tip terminates at the level of the  jenifer.  2. Waxing and waning bilateral airspace opacities with improved  aeration in the left lower lobe and slightly increased hazy opacities  in the right lower lobe.    I have personally reviewed the examination and initial interpretation  and I agree with the findings.    RODERICK HENDERSON MD         SYSTEM ID:  [id number]   Troponin I   Result Value Ref Range    Troponin I 0.200 (HH) 0.000 - 0.045 ug/L   Echocardiogram Complete   Result Value Ref Range    LVEF  45-50% (mildly reduced)     Narrative    [id number]  [id number]  [id number]  [id number]^STEPHANIE^NORMA^MARK     [facility],[place]  Echocardiography Laboratory  [address] [place] [id number]     Name: DAVID SANDERS  MRN: [id number]  : 1965  Study Date: 2021 07:59 AM  Age: 56 yrs  Gender: Male  Patient Location: [facility]  Reason For Study: MI  Ordering Physician: NORMA BROWN  Performed By: Deena Conley RDCS     BSA: 2.0 m2  Height: 69  in  Weight: 178 lb  BP: 148/73 mmHg  ______________________________________________________________________________  Procedure  Complete Portable Echo Adult. Contrast Optison. Technically difficult study.  Optison (NDC #9265-4244-80) given intravenously. Patient was given 5 ml  mixture of 3 ml Optison and 6 ml saline. 4 ml wasted.  ______________________________________________________________________________  Interpretation Summary  Ischemic cardiomyopathy with CAD in the LAD territory  Left ventricular function is decreased. The ejection fraction is 45-50%  (mildly reduced). Mid anteroseptal, distal septal and apical akinesis is  present.  Global right ventricular function is normal.  No significant valvular dysfunction present.  The inferior vena cava was normal in size with preserved respiratory  variability.  No pericardial effusion is present.  ______________________________________________________________________________  Left Ventricle  Left ventricular size is normal. Mild concentric wall thickening consistent  with left ventricular hypertrophy is present. Grade I or early diastolic  dysfunction. Left ventricular function is decreased. The ejection fraction is  45-50% (mildly reduced). Mid anteroseptal, distal septal and apical akinesis  is present.     Right Ventricle  The right ventricle is normal size. Global right ventricular function is  normal.     Atria  Both atria appear normal.     Mitral Valve  Mitral leaflet thickness is normal.     Aortic Valve  Trileaflet aortic sclerosis without stenosis.     Tricuspid Valve  The tricuspid valve is normal. The peak velocity of the tricuspid regurgitant  jet is not obtainable. Pulmonary artery systolic pressure cannot be assessed.     Pulmonic Valve  The pulmonic valve cannot be assessed.     Vessels  The aorta root is normal. The inferior vena cava was normal in size with  preserved respiratory variability.     Pericardium  No pericardial effusion is  present.     Compared to Previous Study  This study was compared with the study from 19 . Cardiomyopathy is new.  ______________________________________________________________________________  MMode/2D Measurements & Calculations  RVDd: 2.9 cm  IVSd: 1.3 cm  LVIDd: 4.5 cm  LVIDs: 3.3 cm  LVPWd: 1.1 cm  FS: 28.1 %  LV mass(C)d: 199.1 grams  LV mass(C)dI: 101.3 grams/m2  asc Aorta Diam: 3.2 cm  LVOT diam: 2.3 cm  LVOT area: 4.2 cm2     EF(MOD-bp): 49.6 %  RWT: 0.48     Doppler Measurements & Calculations  MV E max vivian: 51.4 cm/sec  MV A max vivian: 73.7 cm/sec  MV E/A: 0.70  MV dec slope: 219.0 cm/sec2  E/E' av.5  Lateral E/e': 10.5  Medial E/e': 8.6     ______________________________________________________________________________  Report approved by: Radha Gomez 2021 09:18 AM         XR Chest Port 1 View    Impression    RESIDENT PRELIMINARY INTERPRETATION  IMPRESSION:   1. Intra-aortic balloon pump tip terminates over the aortic knob.  2. Unchanged bibasilar hazy opacities.   XR Chest Port 1 View    Impression    RESIDENT PRELIMINARY INTERPRETATION  IMPRESSION:   1. Intra-aortic balloon pump tip terminates 1.7 cm below the superior  margin of the aortic notch.  2. Unchanged bibasilar hazy opacities.     *Note: Due to a large number of results and/or encounters for the requested time period, some results have not been displayed. A complete set of results can be found in Results Review.         I have personally reviewed the following labs/imaging:  CBC  Recent Labs   Lab 21  0404 21  2343 21  1036   WBC 7.0  --  5.9   RBC 2.82*  --  2.10*   HGB 8.5* 8.0* 6.3*   HCT 26.1*  --  20.4*   MCV 93  --  97   MCH 30.1  --  30.0   MCHC 32.6  --  30.9*   RDW 16.2*  --  15.2*   *  --  126*     CMP  Recent Labs   Lab 21  0735 21  0404 21  0401 21  0141 21  1036   NA  --  140  --   --  138   POTASSIUM  --  3.8  --   --  4.6   CHLORIDE  --  109  --   --  107    CO2  --  24  --   --  25   ANIONGAP  --  7  --   --  6   * 162* 158* 136* 142*   BUN  --  31*  --   --  33*   CR  --  1.45*  --   --  1.55*   GFRESTIMATED  --  53*  --   --  49*   DEBORAH  --  8.7  --   --  9.4   MAG  --   --   --   --  2.2   PROTTOTAL  --   --   --   --  7.4   ALBUMIN  --   --   --   --  4.1   BILITOTAL  --   --   --   --  0.7   ALKPHOS  --   --   --   --  100   AST  --   --   --   --  8   ALT  --   --   --   --  20     INR  Recent Labs   Lab 07/12/21  1608   INR 0.96

## 2021-07-13 NOTE — PROGRESS NOTES
SPIRITUAL HEALTH SERVICES  SPIRITUAL ASSESSMENT Progress Note  [facility] 4E     REFERRAL SOURCE: Initial  visit with patient, per request for hospital  visit as noted in initial nursing assessment.     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 He would appreciate a visit from priest vincent, if possible, before his surgery.    PLAN: will refer to priest vincent Father Daylin for visit on Wednesday.    Eagle Adamson M.Div. (Bill), [facility]  Staff   Pager [phone]      * [facility] remains available 24/7 for emergent requests/referrals, either by having the switchboard page the on-call  or by entering an ASAP/STAT consult in Epic (this will also page the on-call ). Routine Epic consults receive an initial response within 24 hours.*

## 2021-07-13 NOTE — PLAN OF CARE
Major Shift Events:  Pt alert and oriented throughout the shift. No neuro deficits. Pt woke up from nap around 1730 with slight confusion. Easily redirectable. CMS intact in all 4 extremities with some baseline bilateral numbness/tingling to feet. VSS. On RA with O2 sats >95%. NSR to ST with no ectopy. 12 stable. BP stable. Right femoral balloon pump in place, 1:1, 100% augmentation. Advanced by provider this AM and placement verified by x-ray. No other acute events. Site oozing and dressing changed x3 this shift. Hgb stable. Troponin trending down. Pt on heparin drip and following anti-xa levels per protocol. Pt voiding, no BM, one large emesis. ECHO, x-rays, and ultrasounds completed today for surgery workup.     Plan: NPO at midnight for planned CABG with Dr Kumar on 7/14 late morning.     For vital signs and complete assessments, please see documentation flowsheets.

## 2021-07-13 NOTE — CONSULTS
"Cardiovascular and Thoracic Surgery Consultation      Frandy Workman MRN# [id number]   YOB: 1965 Age: 56 year old   Date of Admission: 7/12/2021     Reason for consult: NSTEMI, Coronary artery disease with left main disease and IABP           Assessment and Plan:   Will d/w Dr Mallory (CT surgeon).  Noted coronary angiogram with 90% LM, 60% proximal LAD, mild diffuse disease throughout the RCA.  Echo done but not read.  Currently on heparin gtt  +IABP  Hx of chronic anemia (Hgb 6.3 on admission)  CKD stage 3 (baseline Cr 1.5-1.7)  Cirrhosis s/p liver transplant ( mg bid, prednisone 5 mg)  Chronic steroid usage  Treatment for Hep B  Will place orders for carotid US and LE US.  IABP being adjusted by cardiology.  Will have hematology review/assess pt.          Chief Complaint:   Chest pain and L arm pain intermittently for last few days.    History is obtained from the patient    History of Present Illness:     Pt is a 56 year old male who presented to the emergency room with chest pain and left arm pain intermittently for the last few days. States that this started on the evening of Friday, July 9, 2021. States that at around 2000 on 7/9 he went done to get his meds (lives in a 3 story house) and when he came up a pain started in his L arm that he describes as sharp. Describes it as severe in intensity. He was unable to lie down at that time and it did progress to the left side of his chest. Started from front to back and describes it as \"blips.\") At that time he did check his blood pressure and noted that it was 140/120. Let it come down to 120/70. At the time his BP was elevated he did not some sob. Denies any lightheadedness or dizziness. Denies any swelling in his lower extremities. Was able to get some sleep that night. He did have some improvement over the weekend but continued to be persistent. In the am he awoke with tightness in his arm. symptoms were mild over the weekend coming and " going. He presented to the ED on Monday when the chest pain worsened.     Pt does live alone. He does have his groceries delivered to his house. Has a friend that drives him around (who he states was homeless before but now has a place). Does not use a walker or cane except in the winter to prevent falls.     His DM type II is well controlled. Being treated for Hep B exposure. Hx of chronic anemia. Has a hx of liver transplant. He also has a hx of hypertension, CKD stage 3 with a baseline of 1.5-1.7 and hyperlipidemia. Pt states that he is compliant with medications.            Past Medical History:     Past Medical History:   Diagnosis Date     Anemia 2013     Arthritis      BPH (benign prostatic hyperplasia)      CAD (coronary artery disease) 4/1/2019     Cholelithiasis      Conductive hearing loss 08/16/2017     Depressive disorder 1986    Suffer effects throughout life     Gastroesophageal reflux disease 12/01/2014     HCC (hepatocellular carcinoma) (H) 1/22/2019     History of diabetic retinopathy 07/2018     HTN (hypertension)      HTN (hypertension) 11/20/2019     Hyperlipidemia      Liver cirrhosis secondary to ESTRADA (H)      Liver transplanted (H) 11/11/2019     Portal vein thrombosis 4/11/2019     Type II diabetes mellitus (H)              Past Surgical History:     Past Surgical History:   Procedure Laterality Date     COLONOSCOPY      2015     COLONOSCOPY N/A 12/6/2019    Procedure: COLONOSCOPY, WITH POLYPECTOMY AND BIOPSY;  Surgeon: Adam Morton MD;  Location:  GI     CV HEART CATHETERIZATION WITH POSSIBLE INTERVENTION N/A 2/26/2019    Procedure: CORS;  Surgeon: Jagdish Hoyt MD;  Location:  HEART CARDIAC CATH LAB     ESOPHAGOSCOPY, GASTROSCOPY, DUODENOSCOPY (EGD), COMBINED N/A 11/17/2016    Procedure: COMBINED ESOPHAGOSCOPY, GASTROSCOPY, DUODENOSCOPY (EGD);  Surgeon: Santi Rosas MD;  Location:  GI     ESOPHAGOSCOPY, GASTROSCOPY, DUODENOSCOPY (EGD), COMBINED N/A 11/17/2017     Procedure: COMBINED ESOPHAGOSCOPY, GASTROSCOPY, DUODENOSCOPY (EGD);  EGD;  Surgeon: Santi Rosas MD;  Location:  GI     ESOPHAGOSCOPY, GASTROSCOPY, DUODENOSCOPY (EGD), COMBINED N/A 2018    Procedure: EGD;  Surgeon: Santi Rosas MD;  Location: [facility] OR     ESOPHAGOSCOPY, GASTROSCOPY, DUODENOSCOPY (EGD), COMBINED N/A 2019    Procedure: ESOPHAGOGASTRODUODENOSCOPY, WITH BIOPSY;  Surgeon: Adam Morton MD;  Location:  GI     ESOPHAGOSCOPY, GASTROSCOPY, DUODENOSCOPY (EGD), COMBINED N/A 2020    Procedure: ESOPHAGOGASTRODUODENOSCOPY (EGD);  Surgeon: Santi Roass MD;  Location:  GI     HEAD & NECK SURGERY      2017 at [facility].      IMPLANT GOLD WEIGHT EYELID Right 2017    Procedure: IMPLANT WEIGHT EYELID;  Right Upper Eyelid Weight, right tarsal strip lower eyelid;  Surgeon: Milana Malave MD;  Location: [facility] OR     IR CHEMO EMBOLIZATION  2019     KNEE SURGERY Left      ORTHOPEDIC SURGERY       PAROTIDECTOMY, RADICAL NECK DISSECTION Right 2017    Procedure: PAROTIDECTOMY, RADICAL NECK DISSECTION;  Right Superfacial Parotidectomy , Facial nerve repair. with [facility] facial nerve monitor.;  Surgeon: Asiya Morgan MD;  Location: [facility] OR     PICC INSERTION Left 2017    4fr SL BioFlo PICC, 44cm in the L basilic vein w/ tip in the low SVC     RETURN LIVER TRANSPLANT N/A 2019    Procedure: Exploratory laparotomy, hematoma evacuation, abdominal washout;  Surgeon: Александр Ramos MD;  Location: [facility] OR     TRANSPLANT LIVER RECIPIENT  DONOR N/A 2019    Procedure: TRANSPLANT, LIVER, RECIPIENT,  DONOR;  Surgeon: Александр Ramos MD;  Location: [facility] OR     VASCULAR SURGERY                 Social History:     Social History     Tobacco Use     Smoking status: Former Smoker     Packs/day: 6.00     Years: 30.00     Pack years: 180.00     Types: Cigars     Start date: 2016     Quit date: 10/25/2017     Years since quitting: 3.7      Smokeless tobacco: Former User     Types: Chew     Quit date: 10/31/2017     Tobacco comment: 1 tin per week   Substance Use Topics     Alcohol use: No     Alcohol/week: 0.0 standard drinks     Comment: quit 1996             Family History:     Family History   Problem Relation Age of Onset     Prostate Cancer Maternal Grandfather      Substance Abuse Maternal Grandfather         Alcohol     Colon Cancer Father 60     Pancreatic Cancer Father 60     Prostate Cancer Father      Colorectal Cancer Father      Macular Degeneration Father      Cancer Father      Glaucoma Father      Skin Cancer Father      Colorectal Cancer Maternal Grandmother      Cancer Maternal Grandmother      Substance Abuse Maternal Grandmother         Alcohol     Colorectal Cancer Paternal Grandmother      Cancer Mother      Diabetes Mother          3/2016     Cerebrovascular Disease Mother         Passed away in Feb of this year, 80 years old.     Thyroid Disease Mother      Depression Mother      Asthma Sister         Had since birth     Thyroid Disease Sister      Depression Sister      Liver Disease No family hx of      Melanoma No family hx of              Immunizations:     Immunization History   Administered Date(s) Administered     COVID-19,PF,Pfizer 03/15/2021, 2021     Flu 65+ Years 2017     Flu, Unspecified 10/13/1994, 2015, 2016, 2019     HEPA 2015, 2016     HepB 2015, 2016, 2016     Influenza Vaccine IM > 6 months Valent IIV4 2018, 2020     Influenza Vaccine, 6+MO IM (QUADRIVALENT W/PRESERVATIVES) 2017, 2019     Mantoux Tuberculin Skin Test 2019     Pneumo Conj 13-V (2010&after) 10/01/2019     Pneumococcal 23 valent 2015     TDAP Vaccine (Boostrix) 2015     Zoster vaccine recombinant adjuvanted (SHINGRIX) 2020             Allergies:     Allergies   Allergen Reactions     Codeine Other (See Comments)     Cannot  take due to liver  Cannot tolerate oral narcotics     Seasonal Allergies      Sneezing, coughing, runny and itchy eyes             Medications:     Facility-Administered Medications Prior to Admission   Medication Dose Route Frequency Provider Last Rate Last Admin     aflibercept (EYLEA) injection prefilled syringe 2 mg  2 mg Intravitreal Q28 Days Enriqueta Martin MD   2 mg at 05/12/21 1033     aflibercept (EYLEA) injection prefilled syringe 2 mg  2 mg Intravitreal Q28 Days Enriqueta Martin MD   2 mg at 05/12/21 1033     Medications Prior to Admission   Medication Sig Dispense Refill Last Dose     Acetaminophen (TYLENOL) 325 MG CAPS Take 325-650 mg by mouth every 4 hours as needed (pain, fever) 100 capsule 1      ARIPiprazole (ABILIFY) 5 MG tablet daily        Artificial Tear Solution (SM ARTIFICIAL TEARS) SOLN Place 1 drop into the right eye every hour as needed (dry eyes) Apply at least 4 times daily and as needed for dry eye 15 mL 1      aspirin 81 MG EC tablet Take 1 tablet (81 mg) by mouth daily 90 tablet 1      BD VIKTORIA U/F 32G X 4 MM insulin pen needle Use 5 per day 300 each 3      carvedilol (COREG) 3.125 MG tablet Take 1 tablet (3.125 mg) by mouth 2 times daily (with meals) 60 tablet 5      ciclopirox (LOPROX) 0.77 % cream Apply topically 2 times daily To feet and toenails. 90 g 5      Continuous Blood Gluc  (FREESTYLE CLAUDIA 14 DAY READER) CECILIO Use to read blood sugars as per 's instructions. 1 each 0      Continuous Blood Gluc Sensor (FREESTYLE CLAUDIA 14 DAY SENSOR) MISC Change every 14 days. 9 each 3      empagliflozin (JARDIANCE) 25 MG TABS tablet Take 1 tablet (25 mg) by mouth daily 90 tablet 1      glucose (BD GLUCOSE) 4 g chewable tablet Take 1 tablet by mouth every hour as needed for low blood sugar 60 tablet 1      insulin aspart (NOVOLOG PEN) 100 UNIT/ML pen Inject 1-15 Units Subcutaneous 3 times daily (with meals) 20 mL 3      insulin degludec (TRESIBA FLEXTOUCH)  100 UNIT/ML pen Inject 27 units subcutaneously daily 25 mL 3      lamiVUDine (EPIVIR) 100 MG tablet Take 1 tablet (100 mg) by mouth daily 90 tablet 3      Multiple Vitamin (THERAVITE PO) Take 1 tablet by mouth every morning         mycophenolate (GENERIC EQUIVALENT) 250 MG capsule Take 3 capsules (750 mg) by mouth every 12 hours 180 capsule 11      order for DME 1 Device by Device route daily Knee high compression socks 15-20 mmhg. 1 Device 0      pantoprazole (PROTONIX) 40 MG EC tablet TAKE ONE TABLET BY MOUTH EVERY MORNING BEFORE BREAKFAST 90 tablet 3      polyethylene glycol (MIRALAX) 17 g packet Take 1 packet by mouth daily        predniSONE (DELTASONE) 5 MG tablet Take 1 tablet (5 mg) by mouth daily 90 tablet 3      rosuvastatin (CRESTOR) 5 MG tablet Take 1 tablet (5 mg) by mouth daily 90 tablet 3      tamsulosin (FLOMAX) 0.4 MG capsule Take 1 capsule (0.4 mg) by mouth daily 90 capsule 3      vitamin B-12 (CYANOCOBALAMIN) 1000 MCG tablet Take 1 tablet (1,000 mcg) by mouth daily 30 tablet 11      vitamin D3 (CHOLECALCIFEROL) 50 mcg (2000 units) tablet Take 1 tablet (50 mcg) by mouth daily 90 tablet 3              Review of Systems:   The Review of Systems is negative other than noted in the HPI            Physical Exam:   Vitals were reviewed  Temp: 99  F (37.2  C) Temp src: Oral BP: (!) 157/62 Pulse: 89   Resp: 18 SpO2: 98 % O2 Device: None (Room air)    Constitutional:   awake, alert, cooperative, no apparent distress, and appears older than stated age     Lungs:   No increased work of breathing, good air exchange, clear to auscultation bilaterally, no crackles or wheezing     Cardiovascular:   RRR, telemetry SR 80s, -edema LE, +dorsalis pedis pulses 1+ bilaterally, IABP in place on R     Chest / Breast:   Breasts symmetrical, skin without lesion(s), no nipple retraction or dimpling, no nipple discharge, no masses palpated, no axillary or supraclavicular adenopathy         Abdomen:   BS+, NT/ND, soft      Musculoskeletal:   There is no redness, warmth, or swelling of the joints. Tone is normal.     Neurologic:   Awake, alert, oriented to name, place and time.  Cranial nerves II-XII are grossly intact.      Skin:   No rash or acne on exposed skin of chest.             Data:          Lab Results   Component Value Date     07/13/2021     06/28/2021    Lab Results   Component Value Date    CHLORIDE 109 07/13/2021    CHLORIDE 107 06/28/2021    Lab Results   Component Value Date    BUN 31 07/13/2021    BUN 33 06/28/2021      Lab Results   Component Value Date    POTASSIUM 3.8 07/13/2021    POTASSIUM 4.6 06/28/2021    Lab Results   Component Value Date    CO2 24 07/13/2021    CO2 25 06/28/2021    Lab Results   Component Value Date    CR 1.45 07/13/2021    CR 1.62 06/28/2021        Lab Results   Component Value Date    WBC 7.0 07/13/2021    HGB 8.5 (L) 07/13/2021    HCT 26.1 (L) 07/13/2021    MCV 93 07/13/2021     (L) 07/13/2021     Lab Results   Component Value Date    INR 0.96 07/12/2021     Lab Results   Component Value Date    TROPONIN 0.120 (H) 07/12/2021    TROPI <0.015 07/01/2020     Echo (7/13/21) - to be done today    Coronary angiogram (7/12/21) - severe distal LM disease at trifurcation of LCX, LAD, ramus   Mid LM to Ost LAD lesion is 85% stenosed   1st diag lesion is 60% stenosed    Ramus is moderate in size   1st OM is moderate in size      Patient seen and examined. Investigations reviewed. I agree with the findings outlined in the SERA note. Will proceed with CABG. Risks and benefits of surgery discussed with patient who understands and is wiling to proceed with surgery. Plan discussed with Sherri.

## 2021-07-13 NOTE — H&P
Cardiology - [facility]  History and Physical    Date of Admission:  7/12/2021  Date of Service: 07/12/21    ASSESSMENT:   Frandy Workman is a 56 year old male who presents with PMH of CAD with non-occlusive LM disease, HTN, HLD, T2DM, chronic anemia, CKD III, ESTRADA cirrohsis s/p liver transplant 11/2019, possible HCC s/p TACE, h/o asxs HIV infection, Bipolar I, MDD, h/o gastric ulcers w/o CMV or H Pylori, and BPH who presents with NSTEMI with finding of 90% LM disease. S/p IABP. CVTS eval pending.    #NSTEMI  #CAD - Distal LM disease   Patient presented with chest pain, significant ST depressions on ECG. Trop 0.120 on initial presentation. Cath lab was activated given severity of the chest pain, found to have distal LM disease. IABP was placed for perfusion. CVTS to evaluate for CAB.   Platelet: ASA 81   BB: PTA coreg 3.125mg BID, no evidence of shock at this time. Will continue inpatient.   Nitrates: NTG gtt   AC: Heparin gtt   MCS: IABP 1:1  Prior cardiac testing:   Zio patch 5/26/2020 - PVCs <1% no significant abnormalities noted.   Last echo 2/2019 -EF 65% with no regional wall motion abnormalities.  PASP 17.  CORS   Prior angio 2/2019 -  Ostial LM 40-50%, distal LM 30%. Mid LAD 40% after D1 and apical LAD with mild <25% disease. RI with moderate sized vessel. Mid RCA with 20%   stenosis. No sxs at this time.   7/12/2021 - LM 90%, proximal LAD 60%, OM1 40%, mild diffuse disease throughout the RCA.    #HTN - PTA coreg  #HLD -last lipid panel 9/25/2020 LDL 61, , . Continue Rosuva 5Mg  #T2DM - last a1c 4.8 3/4/2020. PTA medications include Empagliflozin. Otherwise patient takes Tresiba 28 units /day. Carb ratio: 1:15g. Low dose novolog correction 1U per 50 >180.   Complications of diabetes include retinopathy, PT management includes injections of Aflibercept.  #Anemia    Hgb 6.3 on presentation. Given 1U in the cath lab. Additional 1 U pending. H/o chronic anemia, in 3/2021: 11-13.  Last iron panel  6/28/2021 iron 112, iron binding capacity low at 226, iron saturation 50 with ferritin of 495. Smear neg 12/2019. Findings likely consistent of AOCD vs secondary to CKD. Unclear if rate of progression of anemia is out of proportion to what would be expected secondary to renal disease alone. Smear and basic anemia workup pending.  #CKD III -likely diabetic nephropathy versus hypertensive   Baseline creatinine 1.5-1.7.  Currently at baseline.    #ESTRADA Cirrhosis s/p liver transplant  #Possible HCC Dx 12/2018 s/p TACE 1/22/2019. S/p liver transplant 11/11/2019. The explanted liver had 2 lesions that were mostly necrotic and less than 3 cm with no clearly identifiable vascular invasion.    Immunosuppression:  BID, Prednisone 5   Prophylaxis: none  #h/o Asxs HIV infection - PTA management includes lamiVUDine  #Bipolar disorder #MDD  #BPH - Tamsulosin 0.4mg daily  #h/o duodenal ulcers - negative for CMV and H Pylori 12/2019    Discussed with Dr. Polanco. Admitted to I.    Mohsan Chaudhry, MD  Division of Cardiology, PGY-4    Chief Complaint: NSTEMI    History of Present Illness   Frandy Workman is a 56 year old male who presents with PMH of CAD with non-occlusive LM disease, HTN, HLD, T2DM, chronic anemia, CKD III, ESTRADA cirrohsis s/p liver transplant 11/2019, possible HCC s/p TACE, h/o asxs HIV infection, Bipolar I, MDD, h/o gastric ulcers w/o CMV or H Pylori, and BPH who presents with NSTEMI with finding of 90% LM disease. S/p IABP. CVTS eval pending.    Patient reports he began to experience a midsternal chest pain starting on Friday that was severe in intensity in the evening. His pain improved but have persistent pain throughout Saturday and Sunday before presenting to the ED today on 7/13 after chest pain worsened again. He was noted to have significant ECG changes with diffuse ST depressions and was taken to the Cath Lab noted to have distal left main disease.    At baseline patient used to be a  functional person, 6 months ago he can walk several miles and go up more than 2 flight of stairs without any distress. Recently his functional capacity has been limited by anemia, reports he is primarily sedentary as of the past several weeks. Patient presented with a hemoglobin of 6.3 for which she has received 2 units of blood.    Patient denies any chest pain at this time. Reports at the time of his onset of chest pain on Friday he developed new onset of orthopnea. Denies any PND or lower extremity edema. No prior history of heart failure symptoms. No palpitations. No diaphoresis.    Social history - Estranged from most of his family members. Has 1 daughter.  Used to work as a finance .  Former chewing tobacco use starting at the age of 15, quit 6 years ago. Denies significant tobacco use, reports intermittent cigar smoking once every few months throughout adult life. No significant history of alcohol use. Never used any illicit drugs.      PAST MEDICAL HISTORY:  Past Medical History:   Diagnosis Date     Anemia 2013     Arthritis      BPH (benign prostatic hyperplasia)      CAD (coronary artery disease) 4/1/2019     Cholelithiasis      Conductive hearing loss 08/16/2017     Depressive disorder 1986    Suffer effects throughout life     Gastroesophageal reflux disease 12/01/2014     HCC (hepatocellular carcinoma) (H) 1/22/2019     History of diabetic retinopathy 07/2018     HTN (hypertension)      HTN (hypertension) 11/20/2019     Hyperlipidemia      Liver cirrhosis secondary to ESTRADA (H)      Liver transplanted (H) 11/11/2019     Portal vein thrombosis 4/11/2019     Type II diabetes mellitus (H)      CURRENT MEDICATIONS:  Current Outpatient Medications   Medication Sig Dispense Refill     Acetaminophen (TYLENOL) 325 MG CAPS Take 325-650 mg by mouth every 4 hours as needed (pain, fever) 100 capsule 1     ARIPiprazole (ABILIFY) 5 MG tablet daily       Artificial Tear Solution (SM ARTIFICIAL TEARS) SOLN Place 1  drop into the right eye every hour as needed (dry eyes) Apply at least 4 times daily and as needed for dry eye 15 mL 1     aspirin 81 MG EC tablet Take 1 tablet (81 mg) by mouth daily 90 tablet 1     BD VIKTORIA U/F 32G X 4 MM insulin pen needle Use 5 per day 300 each 3     carvedilol (COREG) 3.125 MG tablet Take 1 tablet (3.125 mg) by mouth 2 times daily (with meals) 60 tablet 5     ciclopirox (LOPROX) 0.77 % cream Apply topically 2 times daily To feet and toenails. 90 g 5     Continuous Blood Gluc  (FREESTYLE CLAUDIA 14 DAY READER) CECILIO Use to read blood sugars as per 's instructions. 1 each 0     Continuous Blood Gluc Sensor (FREESTYLE CLAUDIA 14 DAY SENSOR) MISC Change every 14 days. 9 each 3     empagliflozin (JARDIANCE) 25 MG TABS tablet Take 1 tablet (25 mg) by mouth daily 90 tablet 1     glucose (BD GLUCOSE) 4 g chewable tablet Take 1 tablet by mouth every hour as needed for low blood sugar 60 tablet 1     insulin aspart (NOVOLOG PEN) 100 UNIT/ML pen Inject 1-15 Units Subcutaneous 3 times daily (with meals) 20 mL 3     insulin degludec (TRESIBA FLEXTOUCH) 100 UNIT/ML pen Inject 27 units subcutaneously daily 25 mL 3     lamiVUDine (EPIVIR) 100 MG tablet Take 1 tablet (100 mg) by mouth daily 90 tablet 3     Multiple Vitamin (THERAVITE PO) Take 1 tablet by mouth every morning        mycophenolate (GENERIC EQUIVALENT) 250 MG capsule Take 3 capsules (750 mg) by mouth every 12 hours 180 capsule 11     order for DME 1 Device by Device route daily Knee high compression socks 15-20 mmhg. 1 Device 0     pantoprazole (PROTONIX) 40 MG EC tablet TAKE ONE TABLET BY MOUTH EVERY MORNING BEFORE BREAKFAST 90 tablet 3     polyethylene glycol (MIRALAX) 17 g packet Take 1 packet by mouth daily       predniSONE (DELTASONE) 5 MG tablet Take 1 tablet (5 mg) by mouth daily 90 tablet 3     rosuvastatin (CRESTOR) 5 MG tablet Take 1 tablet (5 mg) by mouth daily 90 tablet 3     tamsulosin (FLOMAX) 0.4 MG capsule Take 1  capsule (0.4 mg) by mouth daily 90 capsule 3     vitamin B-12 (CYANOCOBALAMIN) 1000 MCG tablet Take 1 tablet (1,000 mcg) by mouth daily 30 tablet 11     vitamin D3 (CHOLECALCIFEROL) 50 mcg (2000 units) tablet Take 1 tablet (50 mcg) by mouth daily 90 tablet 3     PAST SURGICAL HISTORY:  Past Surgical History:   Procedure Laterality Date     COLONOSCOPY      2015     COLONOSCOPY N/A 12/6/2019    Procedure: COLONOSCOPY, WITH POLYPECTOMY AND BIOPSY;  Surgeon: Adam Morton MD;  [facility]: [facility]     CV HEART CATHETERIZATION WITH POSSIBLE INTERVENTION N/A 2/26/2019    Procedure: CORS;  Surgeon: Jagdish Hoyt MD;  Location:  HEART CARDIAC CATH LAB     ESOPHAGOSCOPY, GASTROSCOPY, DUODENOSCOPY (EGD), COMBINED N/A 11/17/2016    Procedure: COMBINED ESOPHAGOSCOPY, GASTROSCOPY, DUODENOSCOPY (EGD);  Surgeon: Santi Rosas MD;  Location:  GI     ESOPHAGOSCOPY, GASTROSCOPY, DUODENOSCOPY (EGD), COMBINED N/A 11/17/2017    Procedure: COMBINED ESOPHAGOSCOPY, GASTROSCOPY, DUODENOSCOPY (EGD);  EGD;  Surgeon: Santi Rosas MD;  [facility]: [facility]     ESOPHAGOSCOPY, GASTROSCOPY, DUODENOSCOPY (EGD), COMBINED N/A 12/28/2018    Procedure: EGD;  Surgeon: Santi Rosas MD;  [facility]     ESOPHAGOSCOPY, GASTROSCOPY, DUODENOSCOPY (EGD), COMBINED N/A 12/6/2019    Procedure: ESOPHAGOGASTRODUODENOSCOPY, WITH BIOPSY;  Surgeon: Adam Morton MD;  [facility]     ESOPHAGOSCOPY, GASTROSCOPY, DUODENOSCOPY (EGD), COMBINED N/A 2/13/2020    Procedure: ESOPHAGOGASTRODUODENOSCOPY (EGD);  Surgeon: Santi Rosas MD;  Location:  GI     HEAD & NECK SURGERY      12/2017 at [facility].      IMPLANT GOLD WEIGHT EYELID Right 11/16/2017    Procedure: IMPLANT WEIGHT EYELID;  Right Upper Eyelid Weight, right tarsal strip lower eyelid;  Surgeon: Milana Malave MD;  Location: [facility]     IR CHEMO EMBOLIZATION  1/22/2019     KNEE SURGERY Left      ORTHOPEDIC SURGERY       PAROTIDECTOMY, RADICAL NECK  DISSECTION Right 2017    Procedure: PAROTIDECTOMY, RADICAL NECK DISSECTION;  Right Superfacial Parotidectomy , Facial nerve repair. with [facility] facial nerve monitor.;  Surgeon: Asiya Morgan MD;  [facility]     PICC INSERTION Left 2017    4fr SL BioFlo PICC, 44cm in the L basilic vein w/ tip in the low SVC     RETURN LIVER TRANSPLANT N/A 2019    Procedure: Exploratory laparotomy, hematoma evacuation, abdominal washout;  Surgeon: Александр Ramos MD;  [facility]     TRANSPLANT LIVER RECIPIENT  DONOR N/A 2019    Procedure: TRANSPLANT, LIVER, RECIPIENT,  DONOR;  Surgeon: Александр Ramos MD;  [facility]     VASCULAR SURGERY       ALLERGIES  Allergies   Allergen Reactions     Codeine Other (See Comments)     Cannot take due to liver  Cannot tolerate oral narcotics     Seasonal Allergies      Sneezing, coughing, runny and itchy eyes     FAMILY HISTORY:  Family History   Problem Relation Age of Onset     Prostate Cancer Maternal Grandfather      Substance Abuse Maternal Grandfather         Alcohol     Colon Cancer Father 60     Pancreatic Cancer Father 60     Prostate Cancer Father      Colorectal Cancer Father      Macular Degeneration Father      Cancer Father      Glaucoma Father      Skin Cancer Father      Colorectal Cancer Maternal Grandmother      Cancer Maternal Grandmother      Substance Abuse Maternal Grandmother         Alcohol     Colorectal Cancer Paternal Grandmother      Cancer Mother      Diabetes Mother          3/2016     Cerebrovascular Disease Mother         Passed away in Feb of this year, 80 years old.     Thyroid Disease Mother      Depression Mother      Asthma Sister         Had since birth     Thyroid Disease Sister      Depression Sister      Liver Disease No family hx of      Melanoma No family hx of        SOCIAL HISTORY:  Social History     Socioeconomic History     Marital status:      Spouse name: None     Number of  children: None     Years of education: None     Highest education level: Bachelor's degree (e.g., BA, AB, BS)   Occupational History     None   Tobacco Use     Smoking status: Former Smoker     Packs/day: 6.00     Years: 30.00     Pack years: 180.00     Types: Cigars     Start date: 5/1/2016     Quit date: 10/25/2017     Years since quitting: 3.7     Smokeless tobacco: Former User     Types: Chew     Quit date: 10/31/2017     Tobacco comment: 1 tin per week   Substance and Sexual Activity     Alcohol use: No     Alcohol/week: 0.0 standard drinks     Comment: quit Sept. 1996     Drug use: No     Sexual activity: Not Currently     Partners: Female     Birth control/protection: Condom   Other Topics Concern     Parent/sibling w/ CABG, MI or angioplasty before 65F 55M? Yes   Social History Narrative    Prior [facility]     Social Determinants of Health     Financial Resource Strain: Low Risk      Difficulty of Paying Living Expenses: Not very hard   Food Insecurity: No Food Insecurity     Worried About Running Out of Food in the Last Year: Never true     Ran Out of Food in the Last Year: Never true   Transportation Needs: No Transportation Needs     Lack of Transportation (Medical): No     Lack of Transportation (Non-Medical): No   Physical Activity: Insufficiently Active     Days of Exercise per Week: 1 day     Minutes of Exercise per Session: 60 min   Stress: Stress Concern Present     Feeling of Stress : To some extent   Social Connections: Socially Isolated     Frequency of Communication with Friends and Family: Once a week     Frequency of Social Gatherings with Friends and Family: Once a week     Attends Rastafarian Services: Never     Active Member of Clubs or Organizations: No     Attends Club or Organization Meetings: Never     Marital Status:    Intimate Partner Violence:      Fear of Current or Ex-Partner:      Emotionally Abused:      Physically Abused:      Sexually Abused:        Review of  Systems   The 10 point Review of Systems is negative other than noted in the HPI or here.    Physical Exam   Temp: 98.6  F (37  C) Temp src: Oral BP: 132/81 Pulse: 109   Resp: 24 SpO2: 99 % O2 Device: None (Room air)    Vital Signs with Ranges  Temp:  [98.6  F (37  C)] 98.6  F (37  C)  Pulse:  [] 109  Resp:  [9-24] 24  BP: (102-132)/(57-95) 132/81  SpO2:  [98 %-100 %] 99 %  180 lbs 0 oz    GEN: NAD, pleasant  HEENT: no icterus  CV: RRR, normal s1/s2, no murmurs/rubs/s3/s4, no heave. JVP 8.   CHEST: CTAB  ABD: soft, NT/ND, NABS  Ext: warm and well perfused. No hematoma at IABP insertion site. 2+ distal pulses. No mottling.  : no flank/suprapubic tenderness  NEURO: AA&Ox3, fluent/appropriate, motor grossly nonfocal  PSYCH: cooperative, affect appropriate    Data   Data reviewed today:  I personally reviewed the EKG tracing showing NSR. Diffuse ST depressions with ST elevations in aVR..  Recent Labs   Lab 07/12/21  1608 07/12/21  1036   WBC  --  5.9   HGB  --  6.3*   MCV  --  97   PLT  --  126*   INR 0.96  --    NA  --  138   POTASSIUM  --  4.6   CHLORIDE  --  107   CO2  --  25   BUN  --  33*   CR  --  1.55*   ANIONGAP  --  6   DEBORAH  --  9.4   GLC  --  142*   ALBUMIN  --  4.1   PROTTOTAL  --  7.4   BILITOTAL  --  0.7   ALKPHOS  --  100   ALT  --  20   AST  --  8   TROPONIN 0.120*  --      No results found for this or any previous visit (from the past 24 hour(s)).

## 2021-07-13 NOTE — PROGRESS NOTES
[facility], Procedure Note          Intra-Aortic Balloon Pump Insertion:       Frandy Workman  MRN# [id number]   July 13, 2021, 1:15 AM Indication: Cardiogenic shock           Procedure performed: July 12, 2021, 7:45 PM   Location: Cath lab   Catheter size: 8 fr   Inserted: 11 inch introducer sheath   Catheter placed: Right femoral artery   Complications:: None   Assist initiated: 1:1 ratio   Percent augmentation: 100   Timing adjusted: Adjusted to achieve optimal assist   Verification of position: Fluoroscopy   Comments: None      Recorded by Layton Mays

## 2021-07-13 NOTE — PROCEDURES
Intraaortic Balloon Pump Re-positioning (bedside)    July 13, 2021    The patient's BP was stable w/ pump momentarily on standby while repositioning occurred.  The balloon was advanced 2cm and secured. A follow up xray was ordered to confirm adequate positioning and is pending at this time. The groin site was c/d/i and vital signs were stable. B/l LE were warm, no signs of distal ischemia were visible. No blood loss during procedure.      Lennie Hammer  Critical Care Cardiology  P [phone]

## 2021-07-13 NOTE — PLAN OF CARE
Admission          7/12/2021  2130  -----------------------------------------------------------  Diagnosis: NSTEMI    Admitted from: Cath Lab  Belongings: clothes, cell phone & glasses  Admission Profile: Complete  Ht./Wt.: Complete  2 RN skin assessment: Completed w/ Chung HEATH     Pt A/Ox4. VSS on RA. SR. R. fem IABP 1:1, 100% augmentation. Denies pain. 2 units pRBCs given in cath lab, Hgb now 8.5. Heparin gtt @ 1150 units/hr, 10A recheck @ 0945. R. radial site CDI, no hematoma. NPO since 0200. Voiding adequately in urinal. Bedrest. CSI primary. Surgery consult today for CABG.

## 2021-07-14 ENCOUNTER — APPOINTMENT (OUTPATIENT)
Dept: GENERAL RADIOLOGY | Facility: CLINIC | Age: 56
DRG: 234 | End: 2021-07-14
Attending: SURGERY
Payer: MEDICARE

## 2021-07-14 ENCOUNTER — APPOINTMENT (OUTPATIENT)
Dept: GENERAL RADIOLOGY | Facility: CLINIC | Age: 56
DRG: 234 | End: 2021-07-14
Attending: PHYSICIAN ASSISTANT
Payer: MEDICARE

## 2021-07-14 LAB
ALBUMIN SERPL-MCNC: 3 G/DL (ref 3.4–5)
ALBUMIN SERPL-MCNC: 3.3 G/DL (ref 3.4–5)
ALP SERPL-CCNC: 62 U/L (ref 40–150)
ALP SERPL-CCNC: 64 U/L (ref 40–150)
ALT SERPL W P-5'-P-CCNC: 15 U/L (ref 0–70)
ALT SERPL W P-5'-P-CCNC: 17 U/L (ref 0–70)
ANION GAP SERPL CALCULATED.3IONS-SCNC: 10 MMOL/L (ref 3–14)
ANION GAP SERPL CALCULATED.3IONS-SCNC: 11 MMOL/L (ref 3–14)
ANION GAP SERPL CALCULATED.3IONS-SCNC: 9 MMOL/L (ref 3–14)
APTT PPP: 37 SECONDS (ref 22–38)
APTT PPP: 46 SECONDS (ref 22–38)
AST SERPL W P-5'-P-CCNC: 29 U/L (ref 0–45)
AST SERPL W P-5'-P-CCNC: 30 U/L (ref 0–45)
BASE EXCESS BLDA CALC-SCNC: -2.4 MMOL/L (ref -9.6–2)
BASE EXCESS BLDA CALC-SCNC: -3.6 MMOL/L (ref -9–1.8)
BASE EXCESS BLDA CALC-SCNC: -5 MMOL/L (ref -9–1.8)
BASE EXCESS BLDV CALC-SCNC: -2.1 MMOL/L (ref -7.7–1.9)
BASE EXCESS BLDV CALC-SCNC: -3.9 MMOL/L (ref -7.7–1.9)
BILIRUB SERPL-MCNC: 0.6 MG/DL (ref 0.2–1.3)
BILIRUB SERPL-MCNC: 1.2 MG/DL (ref 0.2–1.3)
BLD PROD TYP BPU: NORMAL
BLOOD COMPONENT TYPE: NORMAL
BUN SERPL-MCNC: 29 MG/DL (ref 7–30)
BUN SERPL-MCNC: 30 MG/DL (ref 7–30)
BUN SERPL-MCNC: 32 MG/DL (ref 7–30)
CA-I BLD-MCNC: 3.5 MG/DL (ref 4.4–5.2)
CALCIUM SERPL-MCNC: 7.5 MG/DL (ref 8.5–10.1)
CALCIUM SERPL-MCNC: 7.6 MG/DL (ref 8.5–10.1)
CALCIUM SERPL-MCNC: 8.1 MG/DL (ref 8.5–10.1)
CF REDUC 30M P MA P HEP LENFR BLD TEG: 0 % (ref 0–8)
CF REDUC 30M P MA P HEP LENFR BLD TEG: 0.1 % (ref 0–8)
CF REDUC 60M P MA LENFR BLD TEG: 0 % (ref 0–15)
CF REDUC 60M P MA P HEPASE LENFR BLD TEG: 0 % (ref 0–15)
CF REDUC 60M P MA P HEPASE LENFR BLD TEG: 2 % (ref 0–15)
CFT BLD TEG: 1.8 MINUTE (ref 1–3)
CFT P HPASE BLD TEG: 1.7 MINUTE (ref 1–3)
CFT P HPASE BLD TEG: 1.8 MINUTE (ref 1–3)
CHLORIDE BLD-SCNC: 105 MMOL/L (ref 94–109)
CHLORIDE BLD-SCNC: 108 MMOL/L (ref 94–109)
CHLORIDE BLD-SCNC: 111 MMOL/L (ref 94–109)
CI (COAGULATION INDEX) NATIVE: -0.7 (ref -3–3)
CI (COAGULATION INDEX) NATIVE: 1.6 (ref -3–3)
CI (COAGULATION INDEX): -0.1 (ref -3–3)
CLOT ANGLE BLD TEG: 65.3 DEGREES (ref 59–74)
CLOT ANGLE P HPASE BLD TEG: 63.4 DEGREES (ref 59–74)
CLOT ANGLE P HPASE BLD TEG: 67.7 DEGREES (ref 59–74)
CLOT INIT BLD TEG: 6.5 MINUTE (ref 4–9)
CLOT INIT P HPASE BLD TEG: 6.4 MINUTE (ref 4–9)
CLOT INIT P HPASE BLD TEG: 6.8 MINUTE (ref 4–9)
CLOT LYSIS 30M P MA LENFR BLD TEG: 0 % (ref 0–8)
CLOT STRENGTH BLD TEG: 8.3 KD/SC (ref 5.3–13.2)
CLOT STRENGTH P HPASE BLD TEG: 14.1 KD/SC (ref 5.3–13.2)
CLOT STRENGTH P HPASE BLD TEG: 7.7 KD/SC (ref 5.3–13.2)
CO2 SERPL-SCNC: 21 MMOL/L (ref 20–32)
CO2 SERPL-SCNC: 22 MMOL/L (ref 20–32)
CO2 SERPL-SCNC: 23 MMOL/L (ref 20–32)
CODING SYSTEM: NORMAL
CREAT SERPL-MCNC: 1.24 MG/DL (ref 0.66–1.25)
CREAT SERPL-MCNC: 1.4 MG/DL (ref 0.66–1.25)
CREAT SERPL-MCNC: 1.48 MG/DL (ref 0.66–1.25)
CROSSMATCH: NORMAL
ERYTHROCYTE [DISTWIDTH] IN BLOOD BY AUTOMATED COUNT: 15.9 % (ref 10–15)
ERYTHROCYTE [DISTWIDTH] IN BLOOD BY AUTOMATED COUNT: 16.6 % (ref 10–15)
ERYTHROCYTE [DISTWIDTH] IN BLOOD BY AUTOMATED COUNT: 16.6 % (ref 10–15)
FIBRINOGEN PPP-MCNC: 372 MG/DL (ref 170–490)
GFR SERPL CREATININE-BSD FRML MDRD: 52 ML/MIN/1.73M2
GFR SERPL CREATININE-BSD FRML MDRD: 55 ML/MIN/1.73M2
GFR SERPL CREATININE-BSD FRML MDRD: 64 ML/MIN/1.73M2
GLUCOSE BLD-MCNC: 113 MG/DL (ref 70–99)
GLUCOSE BLD-MCNC: 141 MG/DL (ref 70–99)
GLUCOSE BLD-MCNC: 150 MG/DL (ref 70–99)
GLUCOSE BLD-MCNC: 156 MG/DL (ref 70–99)
GLUCOSE BLDC GLUCOMTR-MCNC: 104 MG/DL (ref 70–99)
GLUCOSE BLDC GLUCOMTR-MCNC: 112 MG/DL (ref 70–99)
GLUCOSE BLDC GLUCOMTR-MCNC: 122 MG/DL (ref 70–99)
GLUCOSE BLDC GLUCOMTR-MCNC: 133 MG/DL (ref 70–99)
GLUCOSE BLDC GLUCOMTR-MCNC: 133 MG/DL (ref 70–99)
GLUCOSE BLDC GLUCOMTR-MCNC: 145 MG/DL (ref 70–99)
GLUCOSE BLDC GLUCOMTR-MCNC: 98 MG/DL (ref 70–99)
HCO3 BLD-SCNC: 21 MMOL/L (ref 21–28)
HCO3 BLD-SCNC: 21 MMOL/L (ref 21–28)
HCO3 BLDA-SCNC: 23 MMOL/L (ref 21–28)
HCO3 BLDV-SCNC: 22 MMOL/L (ref 21–28)
HCO3 BLDV-SCNC: 23 MMOL/L (ref 21–28)
HCT VFR BLD AUTO: 23.9 % (ref 40–53)
HCT VFR BLD AUTO: 24.5 % (ref 40–53)
HCT VFR BLD AUTO: 26 % (ref 40–53)
HGB BLD-MCNC: 7.8 G/DL (ref 13.3–17.7)
HGB BLD-MCNC: 8.6 G/DL (ref 13.3–17.7)
INR PPP: 1.28 (ref 0.85–1.15)
INR PPP: 1.45 (ref 0.85–1.15)
ISSUE DATE AND TIME: NORMAL
LACTATE BLD-SCNC: 0.8 MMOL/L
LACTATE SERPL-SCNC: 1.2 MMOL/L (ref 0.7–2)
MAGNESIUM SERPL-MCNC: 3.2 MG/DL (ref 1.6–2.3)
MCF BLD TEG: 62.3 MM (ref 55–74)
MCF P HPASE BLD TEG: 60.6 MM (ref 55–74)
MCF P HPASE BLD TEG: 73.9 MM (ref 55–74)
MCH RBC QN AUTO: 29.2 PG (ref 26.5–33)
MCH RBC QN AUTO: 29.2 PG (ref 26.5–33)
MCH RBC QN AUTO: 30.2 PG (ref 26.5–33)
MCHC RBC AUTO-ENTMCNC: 31.8 G/DL (ref 31.5–36.5)
MCHC RBC AUTO-ENTMCNC: 32.6 G/DL (ref 31.5–36.5)
MCHC RBC AUTO-ENTMCNC: 33.1 G/DL (ref 31.5–36.5)
MCV RBC AUTO: 90 FL (ref 78–100)
MCV RBC AUTO: 91 FL (ref 78–100)
MCV RBC AUTO: 92 FL (ref 78–100)
O2/TOTAL GAS SETTING VFR VENT: 21 %
O2/TOTAL GAS SETTING VFR VENT: 21 %
O2/TOTAL GAS SETTING VFR VENT: 5 %
O2/TOTAL GAS SETTING VFR VENT: 5 %
OXYHGB MFR BLD: 95 % (ref 92–100)
OXYHGB MFR BLD: 97 % (ref 92–100)
OXYHGB MFR BLDV: 53 % (ref 70–75)
OXYHGB MFR BLDV: 65 % (ref 70–75)
PCO2 BLD: 37 MM HG (ref 35–45)
PCO2 BLD: 44 MM HG (ref 35–45)
PCO2 BLDA: 43 MM HG (ref 35–45)
PCO2 BLDV: 41 MM HG (ref 40–50)
PCO2 BLDV: 45 MM HG (ref 40–50)
PH BLD: 7.29 [PH] (ref 7.35–7.45)
PH BLD: 7.37 [PH] (ref 7.35–7.45)
PH BLDA: 7.34 [PH] (ref 7.35–7.45)
PH BLDV: 7.31 [PH] (ref 7.32–7.43)
PH BLDV: 7.36 [PH] (ref 7.32–7.43)
PHOSPHATE SERPL-MCNC: 2.7 MG/DL (ref 2.5–4.5)
PLATELET # BLD AUTO: 110 10E3/UL (ref 150–450)
PLATELET # BLD AUTO: 114 10E3/UL (ref 150–450)
PLATELET # BLD AUTO: 51 10E3/UL (ref 150–450)
PLATELET # BLD AUTO: 71 10E3/UL (ref 150–450)
PO2 BLD: 145 MM HG (ref 80–105)
PO2 BLD: 87 MM HG (ref 80–105)
PO2 BLDA: 404 MM HG (ref 80–105)
PO2 BLDV: 28 MM HG (ref 25–47)
PO2 BLDV: 35 MM HG (ref 25–47)
POTASSIUM BLD-SCNC: 3.9 MMOL/L (ref 3.4–5.3)
POTASSIUM BLD-SCNC: 4.2 MMOL/L (ref 3.4–5.3)
POTASSIUM BLD-SCNC: 4.2 MMOL/L (ref 3.4–5.3)
POTASSIUM BLD-SCNC: 4.3 MMOL/L (ref 3.4–5.3)
POTASSIUM BLD-SCNC: 5.7 MMOL/L (ref 3.5–5)
PROT SERPL-MCNC: 5.2 G/DL (ref 6.8–8.8)
PROT SERPL-MCNC: 5.7 G/DL (ref 6.8–8.8)
RBC # BLD AUTO: 2.67 10E6/UL (ref 4.4–5.9)
RBC # BLD AUTO: 2.67 10E6/UL (ref 4.4–5.9)
RBC # BLD AUTO: 2.85 10E6/UL (ref 4.4–5.9)
SODIUM BLD-SCNC: 139 MMOL/L (ref 133–144)
SODIUM SERPL-SCNC: 136 MMOL/L (ref 133–144)
SODIUM SERPL-SCNC: 141 MMOL/L (ref 133–144)
SODIUM SERPL-SCNC: 143 MMOL/L (ref 133–144)
TROPONIN I SERPL-MCNC: 0.17 UG/L (ref 0–0.04)
UFH PPP CHRO-ACNC: 0.3 IU/ML
UNIT ABO/RH: NORMAL
UNIT NUMBER: NORMAL
UNIT STATUS: NORMAL
UNIT TYPE ISBT: 5100
WBC # BLD AUTO: 7.2 10E3/UL (ref 4–11)
WBC # BLD AUTO: 7.9 10E3/UL (ref 4–11)
WBC # BLD AUTO: 9.1 10E3/UL (ref 4–11)

## 2021-07-14 PROCEDURE — 82805 BLOOD GASES W/O2 SATURATION: CPT | Performed by: SURGERY

## 2021-07-14 PROCEDURE — 82330 ASSAY OF CALCIUM: CPT

## 2021-07-14 PROCEDURE — 82803 BLOOD GASES ANY COMBINATION: CPT

## 2021-07-14 PROCEDURE — 410N000003 HC PER-PERFUSION 1ST 30 MIN: Performed by: THORACIC SURGERY (CARDIOTHORACIC VASCULAR SURGERY)

## 2021-07-14 PROCEDURE — 999N000045 HC STATISTIC DAILY SWAN MONITORING

## 2021-07-14 PROCEDURE — 360N000079 HC SURGERY LEVEL 6, PER MIN: Performed by: THORACIC SURGERY (CARDIOTHORACIC VASCULAR SURGERY)

## 2021-07-14 PROCEDURE — 258N000003 HC RX IP 258 OP 636: Performed by: STUDENT IN AN ORGANIZED HEALTH CARE EDUCATION/TRAINING PROGRAM

## 2021-07-14 PROCEDURE — 71045 X-RAY EXAM CHEST 1 VIEW: CPT | Mod: 26 | Performed by: STUDENT IN AN ORGANIZED HEALTH CARE EDUCATION/TRAINING PROGRAM

## 2021-07-14 PROCEDURE — 250N000009 HC RX 250: Performed by: THORACIC SURGERY (CARDIOTHORACIC VASCULAR SURGERY)

## 2021-07-14 PROCEDURE — 85610 PROTHROMBIN TIME: CPT | Performed by: INTERNAL MEDICINE

## 2021-07-14 PROCEDURE — 82962 GLUCOSE BLOOD TEST: CPT

## 2021-07-14 PROCEDURE — 85027 COMPLETE CBC AUTOMATED: CPT | Performed by: PHYSICIAN ASSISTANT

## 2021-07-14 PROCEDURE — 258N000001 HC RX 258: Performed by: PHYSICIAN ASSISTANT

## 2021-07-14 PROCEDURE — 999N000155 HC STATISTIC RAPCV CVP MONITORING

## 2021-07-14 PROCEDURE — 83735 ASSAY OF MAGNESIUM: CPT | Performed by: SURGERY

## 2021-07-14 PROCEDURE — P9037 PLATE PHERES LEUKOREDU IRRAD: HCPCS | Performed by: INTERNAL MEDICINE

## 2021-07-14 PROCEDURE — 250N000011 HC RX IP 250 OP 636: Performed by: SURGERY

## 2021-07-14 PROCEDURE — 999N000065 XR CHEST PORT 1 VIEW

## 2021-07-14 PROCEDURE — 85049 AUTOMATED PLATELET COUNT: CPT | Performed by: STUDENT IN AN ORGANIZED HEALTH CARE EDUCATION/TRAINING PROGRAM

## 2021-07-14 PROCEDURE — 93010 ELECTROCARDIOGRAM REPORT: CPT | Mod: 59 | Performed by: INTERNAL MEDICINE

## 2021-07-14 PROCEDURE — 80053 COMPREHEN METABOLIC PANEL: CPT | Performed by: INTERNAL MEDICINE

## 2021-07-14 PROCEDURE — 86923 COMPATIBILITY TEST ELECTRIC: CPT | Performed by: INTERNAL MEDICINE

## 2021-07-14 PROCEDURE — 84100 ASSAY OF PHOSPHORUS: CPT | Performed by: SURGERY

## 2021-07-14 PROCEDURE — 84450 TRANSFERASE (AST) (SGOT): CPT | Performed by: SURGERY

## 2021-07-14 PROCEDURE — 250N000024 HC ISOFLURANE, PER MIN: Performed by: THORACIC SURGERY (CARDIOTHORACIC VASCULAR SURGERY)

## 2021-07-14 PROCEDURE — 85610 PROTHROMBIN TIME: CPT | Performed by: SURGERY

## 2021-07-14 PROCEDURE — 02100Z9 BYPASS CORONARY ARTERY, ONE ARTERY FROM LEFT INTERNAL MAMMARY, OPEN APPROACH: ICD-10-PCS | Performed by: THORACIC SURGERY (CARDIOTHORACIC VASCULAR SURGERY)

## 2021-07-14 PROCEDURE — 250N000009 HC RX 250: Performed by: INTERNAL MEDICINE

## 2021-07-14 PROCEDURE — 021009W BYPASS CORONARY ARTERY, ONE ARTERY FROM AORTA WITH AUTOLOGOUS VENOUS TISSUE, OPEN APPROACH: ICD-10-PCS | Performed by: THORACIC SURGERY (CARDIOTHORACIC VASCULAR SURGERY)

## 2021-07-14 PROCEDURE — 85027 COMPLETE CBC AUTOMATED: CPT | Performed by: SURGERY

## 2021-07-14 PROCEDURE — 85049 AUTOMATED PLATELET COUNT: CPT | Performed by: INTERNAL MEDICINE

## 2021-07-14 PROCEDURE — 250N000025 HC SEVOFLURANE, PER MIN: Performed by: THORACIC SURGERY (CARDIOTHORACIC VASCULAR SURGERY)

## 2021-07-14 PROCEDURE — 258N000003 HC RX IP 258 OP 636: Performed by: INTERNAL MEDICINE

## 2021-07-14 PROCEDURE — 370N000017 HC ANESTHESIA TECHNICAL FEE, PER MIN: Performed by: THORACIC SURGERY (CARDIOTHORACIC VASCULAR SURGERY)

## 2021-07-14 PROCEDURE — 93503 INSERT/PLACE HEART CATHETER: CPT

## 2021-07-14 PROCEDURE — 250N000012 HC RX MED GY IP 250 OP 636 PS 637: Performed by: STUDENT IN AN ORGANIZED HEALTH CARE EDUCATION/TRAINING PROGRAM

## 2021-07-14 PROCEDURE — 250N000011 HC RX IP 250 OP 636: Performed by: NURSE ANESTHETIST, CERTIFIED REGISTERED

## 2021-07-14 PROCEDURE — P9037 PLATE PHERES LEUKOREDU IRRAD: HCPCS | Performed by: STUDENT IN AN ORGANIZED HEALTH CARE EDUCATION/TRAINING PROGRAM

## 2021-07-14 PROCEDURE — 272N000001 HC OR GENERAL SUPPLY STERILE: Performed by: THORACIC SURGERY (CARDIOTHORACIC VASCULAR SURGERY)

## 2021-07-14 PROCEDURE — 250N000009 HC RX 250: Performed by: SURGERY

## 2021-07-14 PROCEDURE — 83605 ASSAY OF LACTIC ACID: CPT | Performed by: SURGERY

## 2021-07-14 PROCEDURE — 272N000088 HC PUMP APP ADULT PERFUSION: Performed by: THORACIC SURGERY (CARDIOTHORACIC VASCULAR SURGERY)

## 2021-07-14 PROCEDURE — 84155 ASSAY OF PROTEIN SERUM: CPT | Performed by: SURGERY

## 2021-07-14 PROCEDURE — P9016 RBC LEUKOCYTES REDUCED: HCPCS | Performed by: INTERNAL MEDICINE

## 2021-07-14 PROCEDURE — 84132 ASSAY OF SERUM POTASSIUM: CPT | Performed by: SURGERY

## 2021-07-14 PROCEDURE — 999N000015 HC STATISTIC ARTERIAL MONITORING DAILY

## 2021-07-14 PROCEDURE — 250N000013 HC RX MED GY IP 250 OP 250 PS 637: Performed by: SURGERY

## 2021-07-14 PROCEDURE — 250N000012 HC RX MED GY IP 250 OP 636 PS 637: Performed by: NURSE ANESTHETIST, CERTIFIED REGISTERED

## 2021-07-14 PROCEDURE — 71045 X-RAY EXAM CHEST 1 VIEW: CPT | Mod: 26 | Performed by: RADIOLOGY

## 2021-07-14 PROCEDURE — P9041 ALBUMIN (HUMAN),5%, 50ML: HCPCS | Performed by: NURSE ANESTHETIST, CERTIFIED REGISTERED

## 2021-07-14 PROCEDURE — 250N000009 HC RX 250: Performed by: NURSE ANESTHETIST, CERTIFIED REGISTERED

## 2021-07-14 PROCEDURE — 250N000011 HC RX IP 250 OP 636: Performed by: THORACIC SURGERY (CARDIOTHORACIC VASCULAR SURGERY)

## 2021-07-14 PROCEDURE — 410N000004: Performed by: THORACIC SURGERY (CARDIOTHORACIC VASCULAR SURGERY)

## 2021-07-14 PROCEDURE — 272N000004 HC RX 272: Performed by: THORACIC SURGERY (CARDIOTHORACIC VASCULAR SURGERY)

## 2021-07-14 PROCEDURE — 999N000157 HC STATISTIC RCP TIME EA 10 MIN

## 2021-07-14 PROCEDURE — 85384 FIBRINOGEN ACTIVITY: CPT | Performed by: INTERNAL MEDICINE

## 2021-07-14 PROCEDURE — 250N000011 HC RX IP 250 OP 636: Performed by: INTERNAL MEDICINE

## 2021-07-14 PROCEDURE — 258N000003 HC RX IP 258 OP 636: Performed by: NURSE ANESTHETIST, CERTIFIED REGISTERED

## 2021-07-14 PROCEDURE — 200N000002 HC R&B ICU UMMC

## 2021-07-14 PROCEDURE — 250N000012 HC RX MED GY IP 250 OP 636 PS 637: Performed by: THORACIC SURGERY (CARDIOTHORACIC VASCULAR SURGERY)

## 2021-07-14 PROCEDURE — 272N000085 HC PACK CELL SAVER CSP: Performed by: THORACIC SURGERY (CARDIOTHORACIC VASCULAR SURGERY)

## 2021-07-14 PROCEDURE — 999N000075 HC STATISTIC IABP MONITORING

## 2021-07-14 PROCEDURE — 250N000012 HC RX MED GY IP 250 OP 636 PS 637: Performed by: PHYSICIAN ASSISTANT

## 2021-07-14 PROCEDURE — 06BQ4ZZ EXCISION OF LEFT SAPHENOUS VEIN, PERCUTANEOUS ENDOSCOPIC APPROACH: ICD-10-PCS | Performed by: THORACIC SURGERY (CARDIOTHORACIC VASCULAR SURGERY)

## 2021-07-14 PROCEDURE — 250N000012 HC RX MED GY IP 250 OP 636 PS 637: Performed by: INTERNAL MEDICINE

## 2021-07-14 PROCEDURE — 85396 CLOTTING ASSAY WHOLE BLOOD: CPT | Performed by: INTERNAL MEDICINE

## 2021-07-14 PROCEDURE — 84484 ASSAY OF TROPONIN QUANT: CPT | Performed by: PHYSICIAN ASSISTANT

## 2021-07-14 PROCEDURE — 85520 HEPARIN ASSAY: CPT | Performed by: INTERNAL MEDICINE

## 2021-07-14 PROCEDURE — 85730 THROMBOPLASTIN TIME PARTIAL: CPT | Performed by: SURGERY

## 2021-07-14 PROCEDURE — 85730 THROMBOPLASTIN TIME PARTIAL: CPT | Performed by: INTERNAL MEDICINE

## 2021-07-14 PROCEDURE — 250N000013 HC RX MED GY IP 250 OP 250 PS 637: Performed by: INTERNAL MEDICINE

## 2021-07-14 PROCEDURE — 5A1221Z PERFORMANCE OF CARDIAC OUTPUT, CONTINUOUS: ICD-10-PCS | Performed by: THORACIC SURGERY (CARDIOTHORACIC VASCULAR SURGERY)

## 2021-07-14 PROCEDURE — 71045 X-RAY EXAM CHEST 1 VIEW: CPT

## 2021-07-14 RX ORDER — ALBUMIN, HUMAN INJ 5% 5 %
SOLUTION INTRAVENOUS CONTINUOUS PRN
Status: DISCONTINUED | OUTPATIENT
Start: 2021-07-14 | End: 2021-07-14

## 2021-07-14 RX ORDER — FENTANYL CITRATE-0.9 % NACL/PF 10 MCG/ML
PLASTIC BAG, INJECTION (ML) INTRAVENOUS PRN
Status: DISCONTINUED | OUTPATIENT
Start: 2021-07-14 | End: 2021-07-14

## 2021-07-14 RX ORDER — AMOXICILLIN 250 MG
1 CAPSULE ORAL 2 TIMES DAILY
Status: DISCONTINUED | OUTPATIENT
Start: 2021-07-14 | End: 2021-07-22 | Stop reason: HOSPADM

## 2021-07-14 RX ORDER — GABAPENTIN 100 MG/1
100 CAPSULE ORAL 3 TIMES DAILY
Status: DISPENSED | OUTPATIENT
Start: 2021-07-14 | End: 2021-07-22

## 2021-07-14 RX ORDER — NALOXONE HYDROCHLORIDE 0.4 MG/ML
0.4 INJECTION, SOLUTION INTRAMUSCULAR; INTRAVENOUS; SUBCUTANEOUS
Status: DISCONTINUED | OUTPATIENT
Start: 2021-07-14 | End: 2021-07-22 | Stop reason: HOSPADM

## 2021-07-14 RX ORDER — FENTANYL CITRATE 50 UG/ML
INJECTION, SOLUTION INTRAMUSCULAR; INTRAVENOUS PRN
Status: DISCONTINUED | OUTPATIENT
Start: 2021-07-14 | End: 2021-07-14

## 2021-07-14 RX ORDER — ARIPIPRAZOLE 5 MG/1
5 TABLET ORAL DAILY
Status: DISCONTINUED | OUTPATIENT
Start: 2021-07-14 | End: 2021-07-17

## 2021-07-14 RX ORDER — DEXMEDETOMIDINE HYDROCHLORIDE 4 UG/ML
0.2-1.2 INJECTION, SOLUTION INTRAVENOUS CONTINUOUS
Status: DISCONTINUED | OUTPATIENT
Start: 2021-07-14 | End: 2021-07-14 | Stop reason: HOSPADM

## 2021-07-14 RX ORDER — PROTAMINE SULFATE 10 MG/ML
INJECTION, SOLUTION INTRAVENOUS PRN
Status: DISCONTINUED | OUTPATIENT
Start: 2021-07-14 | End: 2021-07-14

## 2021-07-14 RX ORDER — NITROGLYCERIN 20 MG/100ML
INJECTION INTRAVENOUS PRN
Status: DISCONTINUED | OUTPATIENT
Start: 2021-07-14 | End: 2021-07-14

## 2021-07-14 RX ORDER — PANTOPRAZOLE SODIUM 40 MG/1
40 TABLET, DELAYED RELEASE ORAL DAILY
Status: DISCONTINUED | OUTPATIENT
Start: 2021-07-14 | End: 2021-07-22 | Stop reason: HOSPADM

## 2021-07-14 RX ORDER — KETAMINE HYDROCHLORIDE 10 MG/ML
INJECTION INTRAMUSCULAR; INTRAVENOUS PRN
Status: DISCONTINUED | OUTPATIENT
Start: 2021-07-14 | End: 2021-07-14

## 2021-07-14 RX ORDER — HYDROMORPHONE HCL IN WATER/PF 6 MG/30 ML
0.4 PATIENT CONTROLLED ANALGESIA SYRINGE INTRAVENOUS
Status: DISCONTINUED | OUTPATIENT
Start: 2021-07-14 | End: 2021-07-20

## 2021-07-14 RX ORDER — HYDROMORPHONE HCL IN WATER/PF 6 MG/30 ML
0.2 PATIENT CONTROLLED ANALGESIA SYRINGE INTRAVENOUS
Status: DISCONTINUED | OUTPATIENT
Start: 2021-07-14 | End: 2021-07-22 | Stop reason: HOSPADM

## 2021-07-14 RX ORDER — OXYCODONE HYDROCHLORIDE 10 MG/1
10 TABLET ORAL EVERY 4 HOURS PRN
Status: DISCONTINUED | OUTPATIENT
Start: 2021-07-14 | End: 2021-07-22 | Stop reason: HOSPADM

## 2021-07-14 RX ORDER — ASPIRIN 81 MG/1
162 TABLET, CHEWABLE ORAL DAILY
Status: DISCONTINUED | OUTPATIENT
Start: 2021-07-15 | End: 2021-07-14

## 2021-07-14 RX ORDER — LAMIVUDINE 100 MG/1
100 TABLET, FILM COATED ORAL DAILY
Status: DISCONTINUED | OUTPATIENT
Start: 2021-07-15 | End: 2021-07-22 | Stop reason: HOSPADM

## 2021-07-14 RX ORDER — SODIUM CHLORIDE, SODIUM GLUCONATE, SODIUM ACETATE, POTASSIUM CHLORIDE AND MAGNESIUM CHLORIDE 526; 502; 368; 37; 30 MG/100ML; MG/100ML; MG/100ML; MG/100ML; MG/100ML
INJECTION, SOLUTION INTRAVENOUS CONTINUOUS PRN
Status: DISCONTINUED | OUTPATIENT
Start: 2021-07-14 | End: 2021-07-14

## 2021-07-14 RX ORDER — NALOXONE HYDROCHLORIDE 0.4 MG/ML
0.2 INJECTION, SOLUTION INTRAMUSCULAR; INTRAVENOUS; SUBCUTANEOUS
Status: DISCONTINUED | OUTPATIENT
Start: 2021-07-14 | End: 2021-07-22 | Stop reason: HOSPADM

## 2021-07-14 RX ORDER — DEXTROSE MONOHYDRATE 25 G/50ML
25-50 INJECTION, SOLUTION INTRAVENOUS
Status: DISCONTINUED | OUTPATIENT
Start: 2021-07-14 | End: 2021-07-15

## 2021-07-14 RX ORDER — ACETAMINOPHEN 325 MG/1
650 TABLET ORAL EVERY 4 HOURS PRN
Status: DISCONTINUED | OUTPATIENT
Start: 2021-07-17 | End: 2021-07-22 | Stop reason: HOSPADM

## 2021-07-14 RX ORDER — METHOCARBAMOL 750 MG/1
750 TABLET, FILM COATED ORAL EVERY 6 HOURS PRN
Status: DISCONTINUED | OUTPATIENT
Start: 2021-07-14 | End: 2021-07-22 | Stop reason: HOSPADM

## 2021-07-14 RX ORDER — ACETAMINOPHEN 325 MG/1
975 TABLET ORAL EVERY 8 HOURS
Status: DISPENSED | OUTPATIENT
Start: 2021-07-14 | End: 2021-07-17

## 2021-07-14 RX ORDER — NICOTINE POLACRILEX 4 MG
15-30 LOZENGE BUCCAL
Status: DISCONTINUED | OUTPATIENT
Start: 2021-07-14 | End: 2021-07-15

## 2021-07-14 RX ORDER — HEPARIN SODIUM 5000 [USP'U]/.5ML
5000 INJECTION, SOLUTION INTRAVENOUS; SUBCUTANEOUS EVERY 8 HOURS
Status: DISCONTINUED | OUTPATIENT
Start: 2021-07-15 | End: 2021-07-22 | Stop reason: HOSPADM

## 2021-07-14 RX ORDER — NOREPINEPHRINE BITARTRATE 0.06 MG/ML
.01-.6 INJECTION, SOLUTION INTRAVENOUS CONTINUOUS
Status: DISCONTINUED | OUTPATIENT
Start: 2021-07-14 | End: 2021-07-14 | Stop reason: HOSPADM

## 2021-07-14 RX ORDER — HYDRALAZINE HYDROCHLORIDE 20 MG/ML
10 INJECTION INTRAMUSCULAR; INTRAVENOUS EVERY 30 MIN PRN
Status: DISCONTINUED | OUTPATIENT
Start: 2021-07-14 | End: 2021-07-22 | Stop reason: HOSPADM

## 2021-07-14 RX ORDER — SODIUM CHLORIDE, SODIUM GLUCONATE, SODIUM ACETATE, POTASSIUM CHLORIDE AND MAGNESIUM CHLORIDE 526; 502; 368; 37; 30 MG/100ML; MG/100ML; MG/100ML; MG/100ML; MG/100ML
250 INJECTION, SOLUTION INTRAVENOUS EVERY 10 MIN PRN
Status: DISCONTINUED | OUTPATIENT
Start: 2021-07-14 | End: 2021-07-15

## 2021-07-14 RX ORDER — OXYCODONE HYDROCHLORIDE 5 MG/1
5 TABLET ORAL EVERY 4 HOURS PRN
Status: DISCONTINUED | OUTPATIENT
Start: 2021-07-14 | End: 2021-07-22 | Stop reason: HOSPADM

## 2021-07-14 RX ORDER — PROPOFOL 10 MG/ML
INJECTION, EMULSION INTRAVENOUS PRN
Status: DISCONTINUED | OUTPATIENT
Start: 2021-07-14 | End: 2021-07-14

## 2021-07-14 RX ORDER — ONDANSETRON 4 MG/1
4 TABLET, ORALLY DISINTEGRATING ORAL EVERY 6 HOURS PRN
Status: DISCONTINUED | OUTPATIENT
Start: 2021-07-14 | End: 2021-07-22 | Stop reason: HOSPADM

## 2021-07-14 RX ORDER — ONDANSETRON 2 MG/ML
4 INJECTION INTRAMUSCULAR; INTRAVENOUS EVERY 6 HOURS PRN
Status: DISCONTINUED | OUTPATIENT
Start: 2021-07-14 | End: 2021-07-20

## 2021-07-14 RX ORDER — PREDNISONE 5 MG/1
5 TABLET ORAL DAILY
Status: DISCONTINUED | OUTPATIENT
Start: 2021-07-15 | End: 2021-07-22 | Stop reason: HOSPADM

## 2021-07-14 RX ORDER — PROCHLORPERAZINE MALEATE 5 MG
10 TABLET ORAL EVERY 6 HOURS PRN
Status: DISCONTINUED | OUTPATIENT
Start: 2021-07-14 | End: 2021-07-22 | Stop reason: HOSPADM

## 2021-07-14 RX ORDER — DIPHENHYDRAMINE HCL 25 MG
25 CAPSULE ORAL EVERY 6 HOURS PRN
Status: DISCONTINUED | OUTPATIENT
Start: 2021-07-14 | End: 2021-07-22 | Stop reason: HOSPADM

## 2021-07-14 RX ORDER — ROSUVASTATIN CALCIUM 5 MG/1
5 TABLET, COATED ORAL DAILY
Status: DISCONTINUED | OUTPATIENT
Start: 2021-07-15 | End: 2021-07-22 | Stop reason: HOSPADM

## 2021-07-14 RX ORDER — HEPARIN SODIUM 1000 [USP'U]/ML
INJECTION, SOLUTION INTRAVENOUS; SUBCUTANEOUS PRN
Status: DISCONTINUED | OUTPATIENT
Start: 2021-07-14 | End: 2021-07-14

## 2021-07-14 RX ORDER — LIDOCAINE 40 MG/G
CREAM TOPICAL
Status: DISCONTINUED | OUTPATIENT
Start: 2021-07-14 | End: 2021-07-22 | Stop reason: HOSPADM

## 2021-07-14 RX ORDER — METOPROLOL TARTRATE 25 MG/1
25 TABLET, FILM COATED ORAL EVERY 12 HOURS
Status: DISCONTINUED | OUTPATIENT
Start: 2021-07-15 | End: 2021-07-16

## 2021-07-14 RX ORDER — ASPIRIN 81 MG/1
324 TABLET, CHEWABLE ORAL DAILY
Status: DISCONTINUED | OUTPATIENT
Start: 2021-07-15 | End: 2021-07-20

## 2021-07-14 RX ORDER — DIPHENHYDRAMINE HYDROCHLORIDE 50 MG/ML
25 INJECTION INTRAMUSCULAR; INTRAVENOUS EVERY 6 HOURS PRN
Status: DISCONTINUED | OUTPATIENT
Start: 2021-07-14 | End: 2021-07-20

## 2021-07-14 RX ORDER — BISACODYL 10 MG
10 SUPPOSITORY, RECTAL RECTAL DAILY PRN
Status: DISCONTINUED | OUTPATIENT
Start: 2021-07-14 | End: 2021-07-20

## 2021-07-14 RX ORDER — POLYETHYLENE GLYCOL 3350 17 G/17G
17 POWDER, FOR SOLUTION ORAL DAILY
Status: DISCONTINUED | OUTPATIENT
Start: 2021-07-15 | End: 2021-07-19

## 2021-07-14 RX ORDER — CEFAZOLIN SODIUM 2 G/100ML
INJECTION, SOLUTION INTRAVENOUS PRN
Status: DISCONTINUED | OUTPATIENT
Start: 2021-07-14 | End: 2021-07-14

## 2021-07-14 RX ORDER — CEFAZOLIN SODIUM 1 G/3ML
1 INJECTION, POWDER, FOR SOLUTION INTRAMUSCULAR; INTRAVENOUS EVERY 8 HOURS
Status: COMPLETED | OUTPATIENT
Start: 2021-07-14 | End: 2021-07-15

## 2021-07-14 RX ORDER — NALOXONE HYDROCHLORIDE 0.4 MG/ML
0.4 INJECTION, SOLUTION INTRAMUSCULAR; INTRAVENOUS; SUBCUTANEOUS
Status: DISCONTINUED | OUTPATIENT
Start: 2021-07-14 | End: 2021-07-20

## 2021-07-14 RX ORDER — PAPAVERINE HYDROCHLORIDE 30 MG/ML
INJECTION INTRAMUSCULAR; INTRAVENOUS PRN
Status: DISCONTINUED | OUTPATIENT
Start: 2021-07-14 | End: 2021-07-14 | Stop reason: HOSPADM

## 2021-07-14 RX ORDER — MUPIROCIN 20 MG/G
0.5 OINTMENT TOPICAL 2 TIMES DAILY
Status: DISPENSED | OUTPATIENT
Start: 2021-07-14 | End: 2021-07-19

## 2021-07-14 RX ORDER — MYCOPHENOLATE MOFETIL 250 MG/1
750 CAPSULE ORAL 2 TIMES DAILY
Status: DISCONTINUED | OUTPATIENT
Start: 2021-07-14 | End: 2021-07-22 | Stop reason: HOSPADM

## 2021-07-14 RX ORDER — DEXMEDETOMIDINE HYDROCHLORIDE 4 UG/ML
.2-.7 INJECTION, SOLUTION INTRAVENOUS CONTINUOUS PRN
Status: DISCONTINUED | OUTPATIENT
Start: 2021-07-14 | End: 2021-07-18

## 2021-07-14 RX ADMIN — Medication 50 MG: at 12:13

## 2021-07-14 RX ADMIN — HEPARIN SODIUM 30000 UNITS: 1000 INJECTION INTRAVENOUS; SUBCUTANEOUS at 10:28

## 2021-07-14 RX ADMIN — SODIUM CHLORIDE 7.5 G: 900 INJECTION, SOLUTION INTRAVENOUS at 09:53

## 2021-07-14 RX ADMIN — SODIUM CHLORIDE, SODIUM GLUCONATE, SODIUM ACETATE, POTASSIUM CHLORIDE AND MAGNESIUM CHLORIDE: 526; 502; 368; 37; 30 INJECTION, SOLUTION INTRAVENOUS at 10:34

## 2021-07-14 RX ADMIN — MYCOPHENOLATE MOFETIL 750 MG: 250 CAPSULE ORAL at 19:47

## 2021-07-14 RX ADMIN — FENTANYL CITRATE 50 MCG: 50 INJECTION, SOLUTION INTRAMUSCULAR; INTRAVENOUS at 08:38

## 2021-07-14 RX ADMIN — SUGAMMADEX 200 MG: 100 INJECTION, SOLUTION INTRAVENOUS at 13:07

## 2021-07-14 RX ADMIN — SODIUM CHLORIDE 1.25 G/HR: 900 INJECTION, SOLUTION INTRAVENOUS at 10:54

## 2021-07-14 RX ADMIN — ONDANSETRON 4 MG: 2 INJECTION INTRAMUSCULAR; INTRAVENOUS at 12:49

## 2021-07-14 RX ADMIN — DEXTROSE 50 % IN WATER (D50W) INTRAVENOUS SYRINGE 5 G: at 12:04

## 2021-07-14 RX ADMIN — FENTANYL CITRATE 250 MCG: 50 INJECTION, SOLUTION INTRAMUSCULAR; INTRAVENOUS at 08:25

## 2021-07-14 RX ADMIN — SUGAMMADEX 200 MG: 100 INJECTION, SOLUTION INTRAVENOUS at 12:53

## 2021-07-14 RX ADMIN — FENTANYL CITRATE 100 MCG: 50 INJECTION, SOLUTION INTRAMUSCULAR; INTRAVENOUS at 10:01

## 2021-07-14 RX ADMIN — OXYCODONE HYDROCHLORIDE 5 MG: 5 TABLET ORAL at 19:47

## 2021-07-14 RX ADMIN — Medication 1 ML: at 08:35

## 2021-07-14 RX ADMIN — MUPIROCIN 0.5 G: 20 OINTMENT TOPICAL at 20:39

## 2021-07-14 RX ADMIN — HUMAN INSULIN 2.5 UNITS/HR: 100 INJECTION, SOLUTION SUBCUTANEOUS at 23:55

## 2021-07-14 RX ADMIN — Medication 200 MCG: at 08:31

## 2021-07-14 RX ADMIN — PROPOFOL 80 MG: 10 INJECTION, EMULSION INTRAVENOUS at 08:25

## 2021-07-14 RX ADMIN — FENTANYL CITRATE 400 MCG: 50 INJECTION, SOLUTION INTRAMUSCULAR; INTRAVENOUS at 12:10

## 2021-07-14 RX ADMIN — HYDROCORTISONE SODIUM SUCCINATE 100 MG: 100 INJECTION, POWDER, FOR SOLUTION INTRAMUSCULAR; INTRAVENOUS at 08:41

## 2021-07-14 RX ADMIN — EPINEPHRINE 0.03 MCG/KG/MIN: 1 INJECTION PARENTERAL at 11:53

## 2021-07-14 RX ADMIN — Medication 1 ML: at 08:45

## 2021-07-14 RX ADMIN — SODIUM CHLORIDE 2 UNITS/HR: 0.9 INJECTION, SOLUTION INTRAVENOUS at 12:19

## 2021-07-14 RX ADMIN — NOREPINEPHRINE BITARTRATE 6.4 MCG: 1 INJECTION, SOLUTION, CONCENTRATE INTRAVENOUS at 11:00

## 2021-07-14 RX ADMIN — SODIUM CHLORIDE, SODIUM GLUCONATE, SODIUM ACETATE, POTASSIUM CHLORIDE AND MAGNESIUM CHLORIDE: 526; 502; 368; 37; 30 INJECTION, SOLUTION INTRAVENOUS at 13:17

## 2021-07-14 RX ADMIN — DEXMEDETOMIDINE 0.5 MCG/KG/HR: 100 INJECTION, SOLUTION, CONCENTRATE INTRAVENOUS at 09:53

## 2021-07-14 RX ADMIN — SODIUM CHLORIDE, POTASSIUM CHLORIDE, SODIUM LACTATE AND CALCIUM CHLORIDE 500 ML: 600; 310; 30; 20 INJECTION, SOLUTION INTRAVENOUS at 17:09

## 2021-07-14 RX ADMIN — NOREPINEPHRINE BITARTRATE 6.4 MCG: 1 INJECTION, SOLUTION, CONCENTRATE INTRAVENOUS at 09:57

## 2021-07-14 RX ADMIN — OXYCODONE HYDROCHLORIDE 5 MG: 5 TABLET ORAL at 22:57

## 2021-07-14 RX ADMIN — Medication 0.5 ML: at 10:06

## 2021-07-14 RX ADMIN — ROCURONIUM BROMIDE 30 MG: 10 INJECTION INTRAVENOUS at 10:33

## 2021-07-14 RX ADMIN — ACETAMINOPHEN 975 MG: 325 TABLET, FILM COATED ORAL at 22:57

## 2021-07-14 RX ADMIN — CEFAZOLIN 1 G: 1 INJECTION, POWDER, FOR SOLUTION INTRAMUSCULAR; INTRAVENOUS at 16:24

## 2021-07-14 RX ADMIN — Medication 0.5 ML: at 13:08

## 2021-07-14 RX ADMIN — ROCURONIUM BROMIDE 40 MG: 10 INJECTION INTRAVENOUS at 09:29

## 2021-07-14 RX ADMIN — CARVEDILOL 3.12 MG: 3.12 TABLET, FILM COATED ORAL at 07:32

## 2021-07-14 RX ADMIN — NITROGLYCERIN 50 MCG: 20 INJECTION INTRAVENOUS at 13:12

## 2021-07-14 RX ADMIN — PROTAMINE SULFATE 10 MG: 10 INJECTION, SOLUTION INTRAVENOUS at 12:07

## 2021-07-14 RX ADMIN — INSULIN ASPART 1 UNITS: 100 INJECTION, SOLUTION INTRAVENOUS; SUBCUTANEOUS at 03:37

## 2021-07-14 RX ADMIN — PROPOFOL 30 MG: 10 INJECTION, EMULSION INTRAVENOUS at 08:40

## 2021-07-14 RX ADMIN — PREDNISONE 5 MG: 5 TABLET ORAL at 07:19

## 2021-07-14 RX ADMIN — Medication 100 MCG: at 08:17

## 2021-07-14 RX ADMIN — GABAPENTIN 100 MG: 100 CAPSULE ORAL at 19:47

## 2021-07-14 RX ADMIN — SODIUM CHLORIDE, SODIUM GLUCONATE, SODIUM ACETATE, POTASSIUM CHLORIDE AND MAGNESIUM CHLORIDE: 526; 502; 368; 37; 30 INJECTION, SOLUTION INTRAVENOUS at 07:49

## 2021-07-14 RX ADMIN — NITROGLYCERIN 50 MCG: 20 INJECTION INTRAVENOUS at 13:16

## 2021-07-14 RX ADMIN — ALBUMIN (HUMAN): 12.5 SOLUTION INTRAVENOUS at 12:49

## 2021-07-14 RX ADMIN — NOREPINEPHRINE BITARTRATE 6.4 MCG: 1 INJECTION, SOLUTION, CONCENTRATE INTRAVENOUS at 09:02

## 2021-07-14 RX ADMIN — VASOPRESSIN 0.5 UNITS/HR: 20 INJECTION INTRAVENOUS at 10:15

## 2021-07-14 RX ADMIN — FENTANYL CITRATE 150 MCG: 50 INJECTION, SOLUTION INTRAMUSCULAR; INTRAVENOUS at 09:48

## 2021-07-14 RX ADMIN — FENTANYL CITRATE 50 MCG: 50 INJECTION, SOLUTION INTRAMUSCULAR; INTRAVENOUS at 08:07

## 2021-07-14 RX ADMIN — MIDAZOLAM 1 MG: 1 INJECTION INTRAMUSCULAR; INTRAVENOUS at 07:53

## 2021-07-14 RX ADMIN — Medication 0.03 MCG/KG/MIN: at 08:30

## 2021-07-14 RX ADMIN — NOREPINEPHRINE BITARTRATE 6.4 MCG: 1 INJECTION, SOLUTION, CONCENTRATE INTRAVENOUS at 09:40

## 2021-07-14 RX ADMIN — SODIUM CHLORIDE, SODIUM GLUCONATE, SODIUM ACETATE, POTASSIUM CHLORIDE AND MAGNESIUM CHLORIDE: 526; 502; 368; 37; 30 INJECTION, SOLUTION INTRAVENOUS at 08:22

## 2021-07-14 RX ADMIN — NOREPINEPHRINE BITARTRATE 6.4 MCG: 1 INJECTION, SOLUTION, CONCENTRATE INTRAVENOUS at 10:08

## 2021-07-14 RX ADMIN — HUMAN INSULIN 2 UNITS/HR: 100 INJECTION, SOLUTION SUBCUTANEOUS at 14:07

## 2021-07-14 RX ADMIN — DOCUSATE SODIUM 50 MG AND SENNOSIDES 8.6 MG 1 TABLET: 8.6; 5 TABLET, FILM COATED ORAL at 19:47

## 2021-07-14 RX ADMIN — LIDOCAINE HYDROCHLORIDE 80 MG: 20 INJECTION INTRAVENOUS at 08:25

## 2021-07-14 RX ADMIN — Medication 300 MCG: at 08:25

## 2021-07-14 RX ADMIN — Medication 2 G: at 09:15

## 2021-07-14 RX ADMIN — ROCURONIUM BROMIDE 100 MG: 10 INJECTION INTRAVENOUS at 08:25

## 2021-07-14 RX ADMIN — MYCOPHENOLATE MOFETIL 750 MG: 250 CAPSULE ORAL at 07:19

## 2021-07-14 RX ADMIN — PROTAMINE SULFATE 150 MG: 10 INJECTION, SOLUTION INTRAVENOUS at 12:08

## 2021-07-14 RX ADMIN — NOREPINEPHRINE BITARTRATE 3.2 MCG: 1 INJECTION, SOLUTION, CONCENTRATE INTRAVENOUS at 10:16

## 2021-07-14 ASSESSMENT — MIFFLIN-ST. JEOR
SCORE: 1573.38
SCORE: 1585.38

## 2021-07-14 ASSESSMENT — ACTIVITIES OF DAILY LIVING (ADL)
ADLS_ACUITY_SCORE: 14
ADLS_ACUITY_SCORE: 15
ADLS_ACUITY_SCORE: 16
ADLS_ACUITY_SCORE: 16

## 2021-07-14 NOTE — H&P
CV ICU H&P  7/14/2021      CO-MORBIDITIES:   Patient Active Problem List   Diagnosis     Bipolar affective disorder in remission (H)     Brow ptosis     Paralytic lagophthalmos of right upper eyelid     Erectile dysfunction due to diseases classified elsewhere     HCC (hepatocellular carcinoma) (H)     Equivocal stress echocardiogram     Type 2 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, unspecified whether long term insulin use (H)     Status post coronary angiogram     CAD (coronary artery disease)     Liver transplant recipient (H)     Status post liver transplantation (H)     Immunosuppressed status (H)     Benign essential hypertension     Malnutrition related to chronic disease (H)     Hypophosphatasia     Chronic kidney disease, stage 3a     Malnutrition (H)     Anxiety     Mild recurrent major depression (H)     Anemia of chronic renal failure, stage 3a     Rekha (H)     History of coronary artery disease     Dyslipidemia     Constipation, unspecified constipation type     Excessive sweating     Stage 3b chronic kidney disease     Anemia of chronic renal failure, stage 3b     NSTEMI (non-ST elevated myocardial infarction) (H)       ASSESSMENT: Frandy Workman is a 56 year old male with history of HTN, HLD, DM2, chronic anemia, stage 3 CKD, HCC and ESTRADA cirrhosis s/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 Zapata on 7/14/2021.    IABP removed at end of case, pressure held for 30 min. Intraoperative course uncomplicated. EBL 1000 with 142mL cell saver given, 3U pRBC. Hgb 8.2. Plt 51, but TEG normal. LVEF 50-55%. 2.5L crystalloid and 250mL albumin given.      =============================================PLAN====================================================  Neuro/ pain/ sedation:  Post-op pain   MDD, Bipolar I  - Pain: scheduled tylenol; PRN oxycodone, PRN IV hydromorphone, PRN Robaxin  - PTA aripiprazole    Pulmonary care:   Postoperative respiratory insufficiency  - Supplemental oxygen to keep saturation above 92 %.  - Patient extubated in OR  - Incentive spirometer every when awake.  - post-op CXR    Cardiovascular:    CAD with NSTEMI (90% stenosis mid-LM) now s/p CABGx2 7/14/2021  HTN, HLD, T2DM  - echo at end of case w/ EF 50-55%  - MAP >65   - Pressors: Epi 0.03- to continue  - aspirin 81 mg qday POD1  - hold PTA coreg 3.125 mg BID  - PTA rosuvastatin 5mg qday (max dose i/s/o immunosuppression)    GI care:   ESTRADA cirrhosis s/p liver transplant 11/2019  HCC s/p TACE  H/o gastric ulcers w/o CMV or H Pylori  - advance diet as tolerated  - docusate and senna BID   - Protonix ppx  - PTA MMF 750mg BID, prednisone 5mg qday    Renal/ Fluid, Electrolytes, and nutrition:    Stage 3 CKD, b/l creat ~1.6  - monitor intake and output.  - Electrolyte replacement protocol  - PTA flomax    Endocrine:    Stress hyperglycemia  T2DM c/b retinopathy  - insulin infusion  - hold PTA jardiance, PTA eylea, Novolog with meals, Tresiba    ID/ Antibiotics:  H/o HIV  - perioperative antibiotics: Ancef  - PTA lamivudine    Heme:     Chronic anemia (followed outpatient by anemia clinic)  Pre-op Hgb 7.8, post-op 8.2  - Hemoglobin stable.   - Products given: 3U pRBC. Will give 1u platelets (ordered in OR)  - appreciate hematology recs    Prophylaxis:    - SCD  - PPI  - No chemical DVT prophylaxis due to high risk of bleeding.     Lines/ tubes/ drains:  - L IJ MAC/swan  - left radial arterial line  - Carroll  - Chest tubes: x3 (2 x meds, 1 x L pleural)    Disposition:  - CVICU     Patient seen, findings and plan discussed with CV ICU staff, Dr. Cruz.    Dara Livingston MD  Surgery,  PGY3  Resident   [id number]      =====================================      HPI:   Frandy Workman is a 56 year old male with PMH of HTN, HLD, T2DM, chronic anemia, stage 3 CKD, ESTRADA cirrhosis s/p liver transplant 11/2019, possible HCC s/p TACE, h/o HIV infection, recurrent gastric ulcers, who presented 7/13 with chest pain and left arm pain since 7/9/21. Found to have NSTEMI with critical distal left main lesion involving the trifurcation of LAD, LCx and RI. IABP placed during the procedure. He is now s/p CABG x2 (GSV to OM, LIMA to LAD) with Dr. Zapata 7/14/2021.     Intraoperative course was uncomplicated. EBL was 1000mL for which the patient received 3U pRBC and 142mL cell saver. Plt 51. TEG normal. Hgb 8.2.       PAST MEDICAL HISTORY:   Past Medical History:   Diagnosis Date     Anemia 2013     Arthritis      BPH (benign prostatic hyperplasia)      CAD (coronary artery disease) 4/1/2019     Cholelithiasis      Conductive hearing loss 08/16/2017     Depressive disorder 1986    Suffer effects throughout life     Gastroesophageal reflux disease 12/01/2014     HCC (hepatocellular carcinoma) (H) 1/22/2019     History of diabetic retinopathy 07/2018     HTN (hypertension)      HTN (hypertension) 11/20/2019     Hyperlipidemia      Liver cirrhosis secondary to ESTRADA (H)      Liver transplanted (H) 11/11/2019     Portal vein thrombosis 4/11/2019     Type II diabetes mellitus (H)        PAST SURGICAL HISTORY:   Past Surgical History:   Procedure Laterality Date     COLONOSCOPY      2015     COLONOSCOPY N/A 12/6/2019    Procedure: COLONOSCOPY, WITH POLYPECTOMY AND BIOPSY;  Surgeon: Adam Morton MD;  Location:  GI     CV CENTRAL VENOUS CATHETER PLACEMENT N/A 7/12/2021    Procedure: Central Venous Catheter Placement;  Surgeon: Fermin Polanco MD;  Location:  HEART CARDIAC CATH LAB     CV CORONARY ANGIOGRAM N/A 7/12/2021    Procedure: Coronary Angiogram;  Surgeon: Fermin Polanco MD;   Location:  HEART CARDIAC CATH LAB     CV HEART CATHETERIZATION WITH POSSIBLE INTERVENTION N/A 2/26/2019    Procedure: CORS;  Surgeon: Jagdish Hoyt MD;  Location:  HEART CARDIAC CATH LAB     CV INTRA AORTIC BALLOON N/A 7/12/2021    Procedure: Intra Aortic Balloon Pump Insertion;  Surgeon: Fermin Polanco MD;  Location:  HEART CARDIAC CATH LAB     ESOPHAGOSCOPY, GASTROSCOPY, DUODENOSCOPY (EGD), COMBINED N/A 11/17/2016    Procedure: COMBINED ESOPHAGOSCOPY, GASTROSCOPY, DUODENOSCOPY (EGD);  Surgeon: Santi Rosas MD;  Location:  GI     ESOPHAGOSCOPY, GASTROSCOPY, DUODENOSCOPY (EGD), COMBINED N/A 11/17/2017    Procedure: COMBINED ESOPHAGOSCOPY, GASTROSCOPY, DUODENOSCOPY (EGD);  EGD;  Surgeon: Santi Rosas MD;  Location:  GI     ESOPHAGOSCOPY, GASTROSCOPY, DUODENOSCOPY (EGD), COMBINED N/A 12/28/2018    Procedure: EGD;  Surgeon: Santi Rosas MD;  Location:  OR     ESOPHAGOSCOPY, GASTROSCOPY, DUODENOSCOPY (EGD), COMBINED N/A 12/6/2019    Procedure: ESOPHAGOGASTRODUODENOSCOPY, WITH BIOPSY;  Surgeon: Adam Morton MD;  Location:  GI     ESOPHAGOSCOPY, GASTROSCOPY, DUODENOSCOPY (EGD), COMBINED N/A 2/13/2020    Procedure: ESOPHAGOGASTRODUODENOSCOPY (EGD);  Surgeon: Santi Rosas MD;  Location:  GI     HEAD & NECK SURGERY      12/2017 at [facility].      IMPLANT GOLD WEIGHT EYELID Right 11/16/2017    Procedure: IMPLANT WEIGHT EYELID;  Right Upper Eyelid Weight, right tarsal strip lower eyelid;  Surgeon: Milana Malave MD;  Location: [facility] OR     IR CHEMO EMBOLIZATION  1/22/2019     KNEE SURGERY Left      ORTHOPEDIC SURGERY       PAROTIDECTOMY, RADICAL NECK DISSECTION Right 11/2/2017    Procedure: PAROTIDECTOMY, RADICAL NECK DISSECTION;  Right Superfacial Parotidectomy , Facial nerve repair. with [facility] facial nerve monitor.;  Surgeon: Asiya Morgan MD;  Location: [facility] OR     PICC INSERTION Left 11/06/2017    4fr SL BioFlo PICC, 44cm in the L  basilic vein w/ tip in the low SVC     RETURN LIVER TRANSPLANT N/A 2019    Procedure: Exploratory laparotomy, hematoma evacuation, abdominal washout;  Surgeon: Александр Ramos MD;  Location: [facility] OR     TRANSPLANT LIVER RECIPIENT  DONOR N/A 2019    Procedure: TRANSPLANT, LIVER, RECIPIENT,  DONOR;  Surgeon: Александр Ramos MD;  Location: [facility] OR     VASCULAR SURGERY         FAMILY HISTORY:   Family History   Problem Relation Age of Onset     Prostate Cancer Maternal Grandfather      Substance Abuse Maternal Grandfather         Alcohol     Colon Cancer Father 60     Pancreatic Cancer Father 60     Prostate Cancer Father      Colorectal Cancer Father      Macular Degeneration Father      Cancer Father      Glaucoma Father      Skin Cancer Father      Colorectal Cancer Maternal Grandmother      Cancer Maternal Grandmother      Substance Abuse Maternal Grandmother         Alcohol     Colorectal Cancer Paternal Grandmother      Cancer Mother      Diabetes Mother          3/2016     Cerebrovascular Disease Mother         Passed away in Feb of this year, 80 years old.     Thyroid Disease Mother      Depression Mother      Asthma Sister         Had since birth     Thyroid Disease Sister      Depression Sister      Liver Disease No family hx of      Melanoma No family hx of        SOCIAL HISTORY:   Social History     Tobacco Use     Smoking status: Former Smoker     Packs/day: 6.00     Years: 30.00     Pack years: 180.00     Types: Cigars     Start date: 2016     Quit date: 10/25/2017     Years since quitting: 3.7     Smokeless tobacco: Former User     Types: Chew     Quit date: 10/31/2017     Tobacco comment: 1 tin per week   Substance Use Topics     Alcohol use: No     Alcohol/week: 0.0 standard drinks     Comment: quit 1996         OBJECTIVE:   1. VITAL SIGNS:   Temp:  [98.5  F (36.9  C)-99.2  F (37.3  C)] 98.5  F (36.9  C)  Pulse:  [] 90  Resp:  [16-20] 18  BP:  (107-146)/(55-89) 107/75  SpO2:  [98 %-100 %] 99 %  Resp: 18      2. INTAKE/ OUTPUT:   I/O last 3 completed shifts:  In: 1100.76 [P.O.:720; I.V.:380.76]  Out: 3275 [Urine:2675; Emesis/NG output:600]    3. PHYSICAL EXAMINATION:   General: NAD  Neuro: somnolent  Resp: Breathing non-labored on 5L oxymask  Chest/CV: RRR. CT x3 w/ bloody output in tubing  Abdomen: Soft, non-distended, non-tender  Extremities: warm and well perfused. LLE in ACE wrap. R groin without hematoma.     4. INVESTIGATIONS:   Arterial Blood Gases   Recent Labs   Lab 07/14/21  0951 07/14/21  0908   PH 7.35 7.36   PCO2 33* 33*   PO2 519* 504*   HCO3 18* 18*     Complete Blood Count   Recent Labs   Lab 07/14/21  0951 07/14/21  0908 07/14/21  0337 07/13/21  1401 07/13/21  0404 07/12/21  1036   WBC  --   --  9.1  --  7.0 5.9   HGB 7.7* 6.5* 7.8* 8.7* 8.5* 6.3*   PLT  --   --  110*  --  123* 126*     Basic Metabolic Panel  Recent Labs   Lab 07/14/21  0951 07/14/21  0908 07/14/21  0337 07/14/21  0335 07/13/21  0404 07/12/21  1036    140 136  --  140 138   POTASSIUM 4.1 4.1 3.9  --  3.8 4.6   CHLORIDE  --   --  105  --  109 107   CO2  --   --  21  --  24 25   BUN  --   --  29  --  31* 33*   CR  --   --  1.40*  --  1.45* 1.55*   * 135* 141* 145* 162* 142*     Liver Function Tests  Recent Labs   Lab 07/12/21  1608 07/12/21  1036   AST  --  8   ALT  --  20   ALKPHOS  --  100   BILITOTAL  --  0.7   ALBUMIN  --  4.1   INR 0.96  --      Pancreatic Enzymes  No lab results found in last 7 days.  Coagulation Profile  Recent Labs   Lab 07/12/21  1608   INR 0.96         5. RADIOLOGY:   Recent Results (from the past 24 hour(s))   XR Chest Port 1 View    Narrative    EXAM: XR CHEST PORT 1 VIEW  7/14/2021 1:20 AM      HISTORY: IABP placement    COMPARISON: X-ray 7/13/2021    FINDINGS: Single frontal  view of the chest. Intra-aortic balloon pump  marker is about 2.5 cm below the superior margin of the aortic notch.  Right IJ central venous catheter projects  over the high right atrium.  No pneumothorax. No pleural effusion. Unchanged cardiac silhouette.  Bibasilar streakiness, likely atelectasis. Visualized upper abdomen is  unremarkable. No aggressive osseous lesions. Visualized upper abdomen  is within normal limits.      Impression    IMPRESSION:    1. Intra-aortic balloon pump marker is about 2.5 cm below the superior  margin of the aortic notch  2. Bibasilar streakiness, likely atelectasis.    I have personally reviewed the examination and initial interpretation  and I agree with the findings.    GUSTAVO SAMAYOA MD         SYSTEM ID:  [id number]       =========================================

## 2021-07-14 NOTE — OP NOTE
OPERATIVE DATE: 7/14/2021    PRE-OPERATIVE DIAGNOSIS:  1) Coronary artery disease  Patient Active Problem List   Diagnosis     Bipolar affective disorder in remission (H)     Brow ptosis     Paralytic lagophthalmos of right upper eyelid     Erectile dysfunction due to diseases classified elsewhere     HCC (hepatocellular carcinoma) (H)     Equivocal stress echocardiogram     Type 2 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, unspecified whether long term insulin use (H)     Status post coronary angiogram     CAD (coronary artery disease)     Liver transplant recipient (H)     Status post liver transplantation (H)     Immunosuppressed status (H)     Benign essential hypertension     Malnutrition related to chronic disease (H)     Hypophosphatasia     Chronic kidney disease, stage 3a     Malnutrition (H)     Anxiety     Mild recurrent major depression (H)     Anemia of chronic renal failure, stage 3a     Rekha (H)     History of coronary artery disease     Dyslipidemia     Constipation, unspecified constipation type     Excessive sweating     Stage 3b chronic kidney disease     Anemia of chronic renal failure, stage 3b     NSTEMI (non-ST elevated myocardial infarction) (H)     POST-OPERATIVE DIAGNOSIS:  1) Coronary artery disease  Patient Active Problem List   Diagnosis     Bipolar affective disorder in remission (H)     Brow ptosis     Paralytic lagophthalmos of right upper eyelid     Erectile dysfunction due to diseases classified elsewhere     HCC (hepatocellular carcinoma) (H)     Equivocal stress echocardiogram     Type 2 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, unspecified whether long term insulin use (H)     Status post coronary angiogram     CAD (coronary artery disease)     Liver transplant recipient (H)     Status post liver transplantation (H)     Immunosuppressed status (H)     Benign essential  hypertension     Malnutrition related to chronic disease (H)     Hypophosphatasia     Chronic kidney disease, stage 3a     Malnutrition (H)     Anxiety     Mild recurrent major depression (H)     Anemia of chronic renal failure, stage 3a     Rekha (H)     History of coronary artery disease     Dyslipidemia     Constipation, unspecified constipation type     Excessive sweating     Stage 3b chronic kidney disease     Anemia of chronic renal failure, stage 3b     NSTEMI (non-ST elevated myocardial infarction) (H)     PROCEDURE:  1) Coronary artery bypass grafting x 2.   - Left internal mammary artery to left anterior descending artery   - Reversed saphenous vein graft to obtuse marginal artery 2  2) Endoscopic vein harvest left lower extremity  3) Transesophageal echocardiogram    SURGEON: Tom Zapata MD    ASSISTANT: Tila Morales MD; Adolfo Aponte PA-C    ANESTHESIA: GETA    ESTIMATED BLOOD LOSS: 1000cc    OPERATIVE FINDINGS:  1) Ejection fraction: 55%  2) Left internal mammary artery 3mm and pulsatile flow.  3) Left greater saphenous vein 4-5mm and suitable for bypass.  4) Left anterior descending artery 2mm and free of disease at anastomosis.  5) Obtuse marginal artery 2mm and free of disease at anastomosis.    INDICATIONS:  Mr. EDGARDO SANDERS is a 56 year old male admitted with NSTEMI and critical left main lesion.  We were asked to evaluate for surgical revascularization.  Risks and benefits of the operation were explained to the patient and their family including, but not limited to, bleeding, infection, stroke and even death.  They understood these risks and agreed to proceed electively.    OPERATIVE REPORT:  The patient was transferred to the operating room and positioned supine on the OR table.  General anesthesia was initiated by the anesthesia team.  Endotracheal intubation and central venous access was performed by anesthesia.  The patients neck, chest, abdomen and bilateral lower extremities  were clipped, prepped and draped in sterile fashion.  A pre-procedure time-out was performed confirming the correct patient, correct site and correct procedure.    Simultaneous median sternotomy and left lower extremity skin incisions were made.  Endoscopic vein harvest of the left greater saphenous vein was performed.  The vein was ligated at the proximal and distal ends, harvested from the leg, cannulated in reverse orientation, and flushed with heparinized saline.  Branches of the vein were triply clipped with metal clips.  The vein was then stored in heparinized saline.    Median sternotomy was made with reciprocating saw.  The bone was made hemostatic with cautery and bone wax.  A mammary retractor was placed.  The left pleural space was opened.  The left internal mammary artery was mobilized.  Branches of the artery were clipped with metal clips and divided with cautery.      The patient was given 300 mg/kg IV heparin.  The mammary pedicle was clamped with a tonsil clamp.  The mammary artery was divided with metzenbaum scissors.  There was excellent pulsatile flow from the mammary artery.  This was controlled with a bulldog clamp.  The distal aspect was tied with 0-ethibond tie and double clipped.  The proximal end was spatulated in preparation for anastomosis and flushed with papavarine.      Pledgeted 2-0 ethibond pursestring was made in the ascending aorta.  A 20F EOPA arterial cannula was placed here and connected to the bypass circuit.  A 2-0 ethibond pursestring was made in the right atrial appendage.  A 28F three stage venous cannula was placed here and connected to the cardiopulmonary bypass circuit.  A 4-0 prolene pursestring was made in the right atrium.  A stab incision was made here.  A retrograde cardioplegia catheter was placed and connected to the cardioplegia circuit.  Next the aorto-pulmonary window was developed.  A 4-0 prolene pursestring was made in the ascending aorta.  A DLP root vent /  antegrade cardioplegia catheter was placed here and connected to the cardioplegia circuit.    Cardiopulmonary bypass was initiated with good flows.  Flow on the circuit was decreased.  A large aortic cross clamp was applied.  Flow on the circuit was resumed.  1000cc of antegrade cold blood cardioplegia was delivered with good diastolic arrest.  An additional 300cc of retrograde cold blood cardioplegia was used to test the retrograde.    We focused our attention on the distal bypass anastomoses next.    The following bypasses were constructed using reversed saphenous vein in end-to-side fashion with running 7-0 prolene:  1) Reversed saphenous vein graft to obtuse marginal artery 2.  The anastomoses were tested by flushing with cold blood cardioplegia to ensure hemostasis.  An additional dose of retrograde cold blood cardioplegia was delivered between completion of each anastomosis.    Next a rent was made in the left pericardium.  The left internal mammary artery was then anastomosed to the left anterior descending artery in end-to-side fashion using running 8-0 prolene.  The mammary pedicle clamp was removed.  The anastomosis was hemostatic.  The clamp was replaced.  The pedicle was tacked to the epicardium using 6-0 prolene.    We focused our attention on the proximal vein graft anastomoses next.  The vein grafts were sized to fit to the ascending aorta.  One additional aortotomies were made with 11-blade knife.  The aortotomies were extended with 4mm punch.  The vein grafts were anastomosed to the ascending aorta in end-to-side fashion using running 6-0 prolene.    A retrograde hot shot of warm blood cardiplegia was delivered.  The clamp on the mammary pedicle was removed.  A bulldog clamp was placed on the proximal vein grafts.  After completion of the hot shot, flow on the bypass circuit was decreased and the aortic cross clamp was removed.  Flow on the bypass circuit was resumed.  The proximal vein grafts were  de-aired using an insulin needle.  The bulldog clamp on the vein grafts was removed.  The retrograde cardioplegia catheter was removed.  The pursestring was tied.  The site was over-sewn with a 4-0 prolene.  The distal anastomoses were inspected and hemostatic.  Ventricular pacing wires were placed and delivered through the anterior abdominal wall and secured to the skin with 0-ethibond stitches.  The patient had normal sinus rhythm and was backup paced at 50 bpm.      The left pleural space was suctioned dry.  The lungs were ventilated bilaterally.  BEN showed no intra-cardiac air.  The DLP root vent / antegrade cardioplegia catheter was removed.  Intravenous calcium was administered.  Flow on the bypass circuit was weaned to off.  The patient was stable on low dose epinephrine and nitroglycerin.  The venous cannula was removed.  Pursestring at this site was tied.  This was over-sewed with 0-ethibond.  The remaining pump blood volume was returned via the arterial cannula.  A test dose of protamine was administered.  The arterial cannula was removed.  The pursestrings at this site were tied.  This was oversewn with pledgeted 4-0 prolene.     The sternal retractor was removed.  Two 32F straight argyle mediastinal chest tubes were placed and one 28F left pleural chest tube was placed.  These were delivered through the anterior abdominal wall and secured to the skin with 0-ethibond stitches.      The wound was made hemostatic with cautery.  The subcutaneous tissue, sternal edges, thymic fat, pericardial edges and all anastomotic and cannulation sites were inspected and hemostatic.    The wound was irrigated with warm antibiotic saline.  The sternum was reapproximated with sternal wires.    The wound was made hemostatic then closed in layers using vicryl stitches.  The subcutaneous tissue was closed with 2-0 vicryl stitches.  The skin was closed with 3-0 vicryl.  Dermabond skin glue was applied.  A sterile dressing was  applied.      The patient was then transferred from the operating bed to an ICU bed and transferred to the ICU in critical, but stable, condition.    All needle, sponge and instrument counts were correct at the end of the case.    Tom Zapata  Cardiothoracic Surgery  Pager: [phone]

## 2021-07-14 NOTE — PROGRESS NOTES
Major Shift Events: Arrived on unit extubated. Oxymask weaned to room air now. CVP 6, given 500mL LR bolus. Received 1u of platelets. Complains of mild pain, declines pain meds.   Plan: Monitor hemodynamics. Encourage rehab.   For vital signs and complete assessments, please see documentation flowsheets.

## 2021-07-14 NOTE — ANESTHESIA POSTPROCEDURE EVALUATION
Patient: Frandy Workman    Procedure(s):  median sternotomy, on cardiopulmonary bypass, CORONARY ARTERY BYPASS GRAFT (CABG) x2 with left greater saphenous vein endoscopic harvest and left internal mammery artery harvest    Diagnosis:CAD (coronary artery disease) [I25.10]  Diagnosis Additional Information: No value filed.    Anesthesia Type:  General    Note:  Disposition: ICU            ICU Sign Out: Anesthesiologist/ICU physician sign out WAS performed   Postop Pain Control: Uneventful            Sign Out: Well controlled pain   PONV: No   Neuro/Psych: Uneventful            Sign Out: Acceptable/Baseline neuro status   Airway/Respiratory: Uneventful            Sign Out: Acceptable/Baseline resp. status   CV/Hemodynamics: Uneventful            Sign Out: Acceptable CV status; No obvious hypovolemia; No obvious fluid overload   Other NRE: NONE   DID A NON-ROUTINE EVENT OCCUR? No           Last vitals:  Vitals:    07/14/21 0600 07/14/21 0700 07/14/21 1400   BP: 121/81 107/75    Pulse: 92 90 92   Resp: 18 18    Temp:   36.6  C (97.9  F)   SpO2: 98% 99%        Last vitals prior to Anesthesia Care Transfer:  CRNA VITALS  7/14/2021 1250 - 7/14/2021 1350      7/14/2021             Pulse:  79    ART BP:  131/74          Electronically Signed By: Sonny Sanches MD  July 14, 2021  2:04 PM

## 2021-07-14 NOTE — CONSULTS
Care Management Initial Consult    General Information  Assessment completed with: patient       Primary Care Provider verified and updated as needed:     Readmission within the last 30 days:   no        Advance Care Planning:     Health Care Directive on file in Epic which names Joey Saunders as patient's primary evans care agent and Alfredo Lamas is named as the alternative.       Communication Assessment  Patient's communication style: spoken language (English or Bilingual)    Hearing Difficulty or Deaf: no   Wear Glasses or Blind: yes    Cognitive  Cognitive/Neuro/Behavioral: WDL  Level of Consciousness: alert  Arousal Level: opens eyes spontaneously  Orientation: oriented x 4  Mood/Behavior: calm, cooperative, restless, other (see comments) (forgetful)  Best Language: 0 - No aphasia  Speech: clear, spontaneous, logical    Living Environment:   People in home:     Miller lives alone.  Current living Arrangements:      House in [place], [place].  Able to return to prior arrangements:  Yes, following TCU/ARU placement.       Family/Social Support:  Care provided by:  No one  Provides care for:  No one                Description of Support System:  Limited.  His friend Davi Saunders is flying from [place] to [place] this week.       Current Resources:   Patient receiving home care services: yes, Sandra Martinez housekeeping   Community Resources:  none  Equipment currently used at home: cane, straight (only in winter)  Supplies currently used at home:  none    Employment/Financial:  Employment Status:     disabled     Financial Concerns:   Long-term disability and SSDI          Lifestyle & Psychosocial Needs:  Social Determinants of Health     Tobacco Use: Medium Risk     Smoking Tobacco Use: Former Smoker     Smokeless Tobacco Use: Former User   Alcohol Use: Not At Risk     Frequency of Alcohol Consumption: Never     Average Number of Drinks: Patient refused     Frequency of Binge Drinking: Never   Financial  Resource Strain: Low Risk      Difficulty of Paying Living Expenses: Not very hard   Food Insecurity: No Food Insecurity     Worried About Running Out of Food in the Last Year: Never true     Ran Out of Food in the Last Year: Never true   Transportation Needs: No Transportation Needs     Lack of Transportation (Medical): No     Lack of Transportation (Non-Medical): No   Physical Activity: Insufficiently Active     Days of Exercise per Week: 1 day     Minutes of Exercise per Session: 60 min   Stress: Stress Concern Present     Feeling of Stress : To some extent   Social Connections: Socially Isolated     Frequency of Communication with Friends and Family: Once a week     Frequency of Social Gatherings with Friends and Family: Once a week     Attends Mu-ism Services: Never     Active Member of Clubs or Organizations: No     Attends Club or Organization Meetings: Never     Marital Status:    Intimate Partner Violence:      Fear of Current or Ex-Partner:      Emotionally Abused:      Physically Abused:      Sexually Abused:    Depression: Not at risk     PHQ-2 Score: 2   Housing Stability: Low Risk      Unable to Pay for Housing in the Last Year: No     Number of Places Lived in the Last Year: 2     Unstable Housing in the Last Year: No       Functional Status:  Prior to admission patient needed assistance: Miller was independent with his ADLs and most IADLs.  He has housekeeping services through [employer].             Mental Health Status:      Diagnoses:  1. Bipolar 1 disorder, depressed, mild (H)    2. Generalized anxiety disorder      Miller is under the care of Dr. Murillo at [facility] for individual therapy, and Dr. Navid Maldonado at [facility] for psychiatry.    Chemical Dependency Status: no concerns                Values/Beliefs:  Spiritual, Cultural Beliefs, Mu-ism Practices, Values that affect care:    Description of Beliefs that Will Affect Care: prefers              Additional Information:  I talked with Miller about TCU/ARU placement following discharge.  Miller believes this will be needed, and he prefers [facility] TCU or ARU.  I made a referral to [facility] TCU/ARU admissions.    Per patient's request, sent a scheduling request to Desirae Chu,  for Dr. Murillo (psychology) because patient missed his appointment on 7/13/21 due to hospitalization.         ALEXYS Mcnair, [facility]  Liver Transplant   Phone [phone]  Pager [phone]

## 2021-07-14 NOTE — BRIEF OP NOTE
[facility]    Brief Operative Note    Pre-operative diagnosis: CAD (coronary artery disease) [I25.10]  Post-operative diagnosis Same as pre-operative diagnosis    Procedure: Procedure(s):  median sternotomy, on cardiopulmonary bypass, CORONARY ARTERY BYPASS GRAFT (CABG) x2 with left greater saphenous vein endoscopic harvest and left internal mammery artery harvest  Surgeon: Surgeon(s) and Role:     * Tom Zapata MD - Primary     * Adolfo Aponte PA-C - Assisting     * Dara Livingston MD - Resident - Assisting     * Tila Morales MD - Fellow - Assisting  Anesthesia: General   Estimated blood loss: 1000cc  Drains: 32 fr and 28 fr  Specimens: * No specimens in log *  Findings:   CAD.  Complications: None.  Implants: * No implants in log *

## 2021-07-14 NOTE — ANESTHESIA PROCEDURE NOTES
Arterial Line Procedure Note  Pre-Procedure   Staff -        Anesthesiologist:  Sonny Sanches MD       Other Anesthesia Staff: Alicia Delgado       Performed By: other anesthesia staff       Location: OR       Pre-Anesthestic Checklist: patient identified, IV checked, risks and benefits discussed, informed consent, monitors and equipment checked, pre-op evaluation and at physician/surgeon's request  Timeout:       Correct Patient: Yes        Correct Procedure: Yes        Correct Site: Yes        Correct Position: Yes   Procedure   Procedure: arterial line       Laterality: left       Insertion Site: radial.  Sterile Prep        Standard elements of sterile barrier followed       Skin prep: Chloraprep  Insertion/Injection        Technique: ultrasound guided        1. Ultrasound was used to evaluate the access site.       2. Artery evaluated via ultrasound for patency/adequacy.       3. Using real-time ultrasound the needle/catheter was observed entering the artery/vein.       Catheter Type/Size: 20 G, 12 cm  Narrative         Secured by: suture       Tegaderm dressing used.       Complications: None apparent,        Arterial waveform: Yes        IBP within 10% of NIBP: Yes

## 2021-07-14 NOTE — ANESTHESIA PROCEDURE NOTES
Airway       Patient location during procedure: OR       Procedure Start/Stop Times: 7/14/2021 8:30 AM  Staff -        Other Anesthesia Staff: Elsa Montes RN       Performed By: SRNA  Consent for Airway        Urgency: elective  Indications and Patient Condition       Indications for airway management: mikaela-procedural       Induction type:intravenous       Mask difficulty assessment: 2 - vent by mask + OA or adjuvant +/- NMBA    Final Airway Details       Final airway type: endotracheal airway       Successful airway: ETT - single  Endotracheal Airway Details        ETT size (mm): 8.0       Cuffed: yes       Successful intubation technique: direct laryngoscopy       DL Blade Type: Cespedes 2       Grade View of Cords: 1       Adjucts: stylet       Position: Right       Measured from: lips       Secured at (cm): 23       Bite block used: None    Post intubation assessment        Placement verified by: capnometry, equal breath sounds and chest rise        Number of attempts at approach: 1       Number of other approaches attempted: 0       Secured with: silk tape and other (comment) (tegaderm)       Ease of procedure: easy       Dentition: Intact and Unchanged

## 2021-07-14 NOTE — ANESTHESIA PROCEDURE NOTES
Central Line/PA Catheter Placement  Pre-Procedure   Staff -        Anesthesiologist:  Sonny Sanches MD       Other Anesthesia Staff: Alicia Delgado       Performed By: other anesthesia staff       Pre-Anesthestic Checklist: patient identified, IV checked, site marked, risks and benefits discussed, informed consent, monitors and equipment checked, pre-op evaluation and at physician/surgeon's request  Timeout:       Correct Patient: Yes        Correct Procedure: Yes        Correct Site: Yes        Correct Position: Yes        Correct Laterality: Yes     Procedure   Procedure: central line       Laterality: left       Insertion Site: internal jugular.       Patient Position: Trendelenburg  Sterile Prep        All elements of maximal sterile barrier technique followed       Patient Prep/Sterile Barriers: draped, hand hygiene, gloves , hat , mask , draped, gown, sterile gel and probe cover       Skin prep: Chloraprep  Insertion/Injection       Local skin infiltrated with mL of 1% lidocaine.        Technique: ultrasound guided        1. Ultrasound was used to evaluate the access site.       2. Vein evaluated via ultrasound for patency/adequacy.       3. Using real-time ultrasound the needle/catheter was observed entering the artery/vein.       Introducer Type: 9 Fr, 2-lumen MAC        PA Catheter Type: CCO         Appropriate RV, RA and PA waveforms noted:  Yes            Withdrawn and Locked at cm: 55  Narrative         Secured by: suture       Tegaderm and Biopatch dressing used.       Complications: None apparent,        blood aspirated from all lumens,        All lumens flushed: Yes       Verification method: Placement to be verified post-op       Tip termination: other location

## 2021-07-14 NOTE — PLAN OF CARE
Major Shift Events:    Neuro: A/O x 4, forgetful but pleasant and redirectable. VIRK, follows commands. Needs frequent redirecting to keep left leg immobilized. Bed alarm on.  Cardiac: NSR-ST 80s-100s. MAPs 70s-90s. IABP 1:1, 100%. Dressing changed x 3 for excessive bleeding. Fellow notified, hub of sheath adjusted, sand bag in place, no further bleeding. No alarms on IABP.  Resp: On room air.  GI/: Gan placed overnight for retention, bladder scan 586ml, had 850ml of urine return after placing gan. Multiple loose stools overnight. NPO since midnight.   Musc: R femoral IABP site, lower abd brusing, no other skin concerns.  IV Access: R forearm PIV with heparin infusing. R internal jugular TL with all lumens saline locked.    Plan: OR today for CABG. Continue to monitor IABP.     For vital signs and complete assessments, please see documentation flowsheets.

## 2021-07-14 NOTE — ANESTHESIA PROCEDURE NOTES
Perioperative BEN Procedure Note    Staff -        Anesthesiologist:  Sonny Sanches MD       Performed By: anesthesiologist  Preanesthesia Checklist:  Patient identified, IV assessed, risks and benefits discussed, monitors and equipment assessed, procedure being performed at surgeon's request and anesthesia consent obtained.    BEN Probe Insertion  Probe Number: 4  Probe Status PRE Insertion: NO obvious damage  Probe type:  Adult 3D  Bite block used:   Soft  Insertion Technique: Easy, no oropharyngeal manipulation  Insertion complications: None obvious  Billing Report:BEN report by Anesthesiologist (See Separate Report note)  Probe Status POST Removal: NO obvious damage    BEN Report  General Procedure Information  Images for this study have been archived.  Modalities: 2D, 3D, CW Doppler and PW Doppler  Diagnostic Indications comments: CABG .  Echocardiographic and Doppler Measurements  Right Ventricle:  Cavity size normal.   Hypertrophy not present.   Thrombus not present.    Global function normal.     Left Ventricle:  Cavity size normal.   Thrombus not present.   Ejection Fraction 55%.    Other Ventricular Findings:  There is hypokinesis of the anterior wall from the mid to apical segment     Valves  Aortic Valve: Annulus normal.  Stenosis not present.  Regurgitation absent.  Leaflets normal.    Mitral Valve: Annulus normal.  Stenosis not present.  Regurgitation +1.  Leaflets normal.  Leaflet motions normal.    Tricuspid Valve: Annulus normal.  Stenosis not present.  Regurgitation +1.  Leaflets normal.  Leaflet motions normal.    Pulmonic Valve: Annulus normal.  Stenosis not present.  Regurgitation absent.      Aorta: Ascending Aorta: Size normal.    Aortic Arch: Size normal.     Descending Aorta: Size normal.     Other Aortic Findings:  There is an IABP in place with the tip located 5cm from the distal aortic arch   Right Atrium:  Size normal.    Left Atrium: Size normal.  Left atrial appendage  normal.     Atrial Septum: Intra-atrial septal morphology normal.   Other atrial septal defect findings:  No PFO by CFD   Ventricular Septum: Intra-ventricular septum morphology normal.       Other Findings:   Pericardium:  normal. Pleural Effusion:  none. Pulmonary Arteries:  normal. Cornoary sinus catheter present.   Post Intervention Findings  Procedure(s) performed:  CABG. Global function:  Unchanged. Regional wall motion: Unchanged. Surgeon(s) notified of all postintervention findings: Yes.   Post Intervention comments: S/p CABG X  2   (LIMA -> LAD, rSVG -> OM1)  On 0.03 mcg/kg/min of epihephrine   LVEF is 55-60%    RV function is normal  Valvular function is unchanged from previous   The IABP has been pulled. There is no evidence of aortic aneurysm or dissection  .    Echocardiogram Comments

## 2021-07-14 NOTE — ANESTHESIA CARE TRANSFER NOTE
Patient: Frandy Workman    Procedure(s):  median sternotomy, on cardiopulmonary bypass, CORONARY ARTERY BYPASS GRAFT (CABG) x2 with left greater saphenous vein endoscopic harvest and left internal mammery artery harvest    Diagnosis: CAD (coronary artery disease) [I25.10]  Diagnosis Additional Information: No value filed.    Anesthesia Type:   General     Note:    Oropharynx: nasal airway in place  Level of Consciousness: drowsy  Oxygen Supplementation: face mask  Level of Supplemental Oxygen (L/min / FiO2): 8  Independent Airway: airway patency satisfactory and stable  Dentition: dentition unchanged  Vital Signs Stable: post-procedure vital signs reviewed and stable  Report to RN Given: handoff report given  Patient transferred to: ICU    ICU Handoff: Call for PAUSE to initiate/utilize ICU HANDOFF, Identified Patient, Identified Responsible Provider, Reviewed the Pertinent Medical History, Discussed Surgical Course, Reviewed Intra-OP Anesthesia Management and Issues during Anesthesia, Set Expectations for Post Procedure Period and Allowed Opportunity for Questions and Acknowledgement of Understanding      Vitals: (Last set prior to Anesthesia Care Transfer)  CRNA VITALS  7/14/2021 1250 - 7/14/2021 1338      7/14/2021             ART BP:  145/59    ART Mean:  88        Electronically Signed By: SHANIQUE Anderson CRNA  July 14, 2021  1:38 PM

## 2021-07-15 ENCOUNTER — APPOINTMENT (OUTPATIENT)
Dept: GENERAL RADIOLOGY | Facility: CLINIC | Age: 56
DRG: 234 | End: 2021-07-15
Attending: SURGERY
Payer: MEDICARE

## 2021-07-15 ENCOUNTER — APPOINTMENT (OUTPATIENT)
Dept: OCCUPATIONAL THERAPY | Facility: CLINIC | Age: 56
DRG: 234 | End: 2021-07-15
Attending: SURGERY
Payer: MEDICARE

## 2021-07-15 ENCOUNTER — ANESTHESIA (OUTPATIENT)
Dept: SURGERY | Facility: CLINIC | Age: 56
DRG: 234 | End: 2021-07-15
Payer: MEDICARE

## 2021-07-15 LAB
ALBUMIN SERPL-MCNC: 3.1 G/DL (ref 3.4–5)
ALP SERPL-CCNC: 60 U/L (ref 40–150)
ALT SERPL W P-5'-P-CCNC: 15 U/L (ref 0–70)
ANION GAP SERPL CALCULATED.3IONS-SCNC: 7 MMOL/L (ref 3–14)
AST SERPL W P-5'-P-CCNC: 22 U/L (ref 0–45)
ATRIAL RATE - MUSE: 106 BPM
ATRIAL RATE - MUSE: 91 BPM
ATRIAL RATE - MUSE: 94 BPM
BASE EXCESS BLDV CALC-SCNC: -1.4 MMOL/L (ref -7.7–1.9)
BILIRUB SERPL-MCNC: 0.5 MG/DL (ref 0.2–1.3)
BUN SERPL-MCNC: 32 MG/DL (ref 7–30)
CA-I BLD-MCNC: 4.2 MG/DL (ref 4.4–5.2)
CALCIUM SERPL-MCNC: 7.4 MG/DL (ref 8.5–10.1)
CHLORIDE BLD-SCNC: 109 MMOL/L (ref 94–109)
CO2 SERPL-SCNC: 24 MMOL/L (ref 20–32)
CREAT SERPL-MCNC: 1.62 MG/DL (ref 0.66–1.25)
DIASTOLIC BLOOD PRESSURE - MUSE: NORMAL MMHG
ERYTHROCYTE [DISTWIDTH] IN BLOOD BY AUTOMATED COUNT: 16.9 % (ref 10–15)
GFR SERPL CREATININE-BSD FRML MDRD: 46 ML/MIN/1.73M2
GLUCOSE BLD-MCNC: 139 MG/DL (ref 70–99)
GLUCOSE BLDC GLUCOMTR-MCNC: 139 MG/DL (ref 70–99)
GLUCOSE BLDC GLUCOMTR-MCNC: 183 MG/DL (ref 70–99)
GLUCOSE BLDC GLUCOMTR-MCNC: 187 MG/DL (ref 70–99)
GLUCOSE BLDC GLUCOMTR-MCNC: 188 MG/DL (ref 70–99)
GLUCOSE BLDC GLUCOMTR-MCNC: 188 MG/DL (ref 70–99)
GLUCOSE BLDC GLUCOMTR-MCNC: 213 MG/DL (ref 70–99)
GLUCOSE BLDC GLUCOMTR-MCNC: 224 MG/DL (ref 70–99)
GLUCOSE BLDC GLUCOMTR-MCNC: 235 MG/DL (ref 70–99)
GLUCOSE BLDC GLUCOMTR-MCNC: 240 MG/DL (ref 70–99)
GLUCOSE BLDC GLUCOMTR-MCNC: 252 MG/DL (ref 70–99)
GLUCOSE BLDC GLUCOMTR-MCNC: 49 MG/DL (ref 70–99)
HCO3 BLDV-SCNC: 24 MMOL/L (ref 21–28)
HCT VFR BLD AUTO: 22.7 % (ref 40–53)
HGB BLD-MCNC: 7.2 G/DL (ref 13.3–17.7)
HGB BLD-MCNC: 7.3 G/DL (ref 13.3–17.7)
INTERPRETATION ECG - MUSE: NORMAL
MAGNESIUM SERPL-MCNC: 2.6 MG/DL (ref 1.6–2.3)
MAGNESIUM SERPL-MCNC: 3 MG/DL (ref 1.6–2.3)
MCH RBC QN AUTO: 29 PG (ref 26.5–33)
MCHC RBC AUTO-ENTMCNC: 32.2 G/DL (ref 31.5–36.5)
MCV RBC AUTO: 90 FL (ref 78–100)
O2/TOTAL GAS SETTING VFR VENT: 21 %
OXYHGB MFR BLDV: 63 % (ref 70–75)
P AXIS - MUSE: 26 DEGREES
P AXIS - MUSE: 29 DEGREES
P AXIS - MUSE: 40 DEGREES
PCO2 BLDV: 41 MM HG (ref 40–50)
PH BLDV: 7.37 [PH] (ref 7.32–7.43)
PHOSPHATE SERPL-MCNC: 2.5 MG/DL (ref 2.5–4.5)
PHOSPHATE SERPL-MCNC: 2.9 MG/DL (ref 2.5–4.5)
PLATELET # BLD AUTO: 132 10E3/UL (ref 150–450)
PO2 BLDV: 31 MM HG (ref 25–47)
POTASSIUM BLD-SCNC: 3.8 MMOL/L (ref 3.4–5.3)
POTASSIUM BLD-SCNC: 4.2 MMOL/L (ref 3.4–5.3)
PR INTERVAL - MUSE: 108 MS
PR INTERVAL - MUSE: 130 MS
PR INTERVAL - MUSE: 154 MS
PROT SERPL-MCNC: 5.6 G/DL (ref 6.8–8.8)
QRS DURATION - MUSE: 68 MS
QRS DURATION - MUSE: 72 MS
QRS DURATION - MUSE: 74 MS
QT - MUSE: 356 MS
QT - MUSE: 380 MS
QT - MUSE: 408 MS
QTC - MUSE: 445 MS
QTC - MUSE: 467 MS
QTC - MUSE: 541 MS
R AXIS - MUSE: 1 DEGREES
R AXIS - MUSE: 4 DEGREES
R AXIS - MUSE: 7 DEGREES
RBC # BLD AUTO: 2.52 10E6/UL (ref 4.4–5.9)
SODIUM SERPL-SCNC: 140 MMOL/L (ref 133–144)
SYSTOLIC BLOOD PRESSURE - MUSE: NORMAL MMHG
T AXIS - MUSE: 104 DEGREES
T AXIS - MUSE: 57 DEGREES
T AXIS - MUSE: 68 DEGREES
TROPONIN I SERPL-MCNC: 1.47 UG/L (ref 0–0.04)
VENTRICULAR RATE- MUSE: 106 BPM
VENTRICULAR RATE- MUSE: 91 BPM
VENTRICULAR RATE- MUSE: 94 BPM
WBC # BLD AUTO: 8.7 10E3/UL (ref 4–11)

## 2021-07-15 PROCEDURE — 93010 ELECTROCARDIOGRAM REPORT: CPT | Performed by: INTERNAL MEDICINE

## 2021-07-15 PROCEDURE — 83735 ASSAY OF MAGNESIUM: CPT | Performed by: SURGERY

## 2021-07-15 PROCEDURE — 84100 ASSAY OF PHOSPHORUS: CPT | Performed by: SURGERY

## 2021-07-15 PROCEDURE — 250N000011 HC RX IP 250 OP 636: Performed by: THORACIC SURGERY (CARDIOTHORACIC VASCULAR SURGERY)

## 2021-07-15 PROCEDURE — 83735 ASSAY OF MAGNESIUM: CPT | Performed by: THORACIC SURGERY (CARDIOTHORACIC VASCULAR SURGERY)

## 2021-07-15 PROCEDURE — 71045 X-RAY EXAM CHEST 1 VIEW: CPT

## 2021-07-15 PROCEDURE — 84484 ASSAY OF TROPONIN QUANT: CPT | Performed by: STUDENT IN AN ORGANIZED HEALTH CARE EDUCATION/TRAINING PROGRAM

## 2021-07-15 PROCEDURE — 82040 ASSAY OF SERUM ALBUMIN: CPT | Performed by: THORACIC SURGERY (CARDIOTHORACIC VASCULAR SURGERY)

## 2021-07-15 PROCEDURE — 258N000001 HC RX 258: Performed by: SURGERY

## 2021-07-15 PROCEDURE — 250N000009 HC RX 250: Performed by: STUDENT IN AN ORGANIZED HEALTH CARE EDUCATION/TRAINING PROGRAM

## 2021-07-15 PROCEDURE — 71045 X-RAY EXAM CHEST 1 VIEW: CPT | Mod: 26 | Performed by: RADIOLOGY

## 2021-07-15 PROCEDURE — 258N000003 HC RX IP 258 OP 636: Performed by: STUDENT IN AN ORGANIZED HEALTH CARE EDUCATION/TRAINING PROGRAM

## 2021-07-15 PROCEDURE — 250N000013 HC RX MED GY IP 250 OP 250 PS 637: Performed by: STUDENT IN AN ORGANIZED HEALTH CARE EDUCATION/TRAINING PROGRAM

## 2021-07-15 PROCEDURE — 85027 COMPLETE CBC AUTOMATED: CPT | Performed by: SURGERY

## 2021-07-15 PROCEDURE — 250N000012 HC RX MED GY IP 250 OP 636 PS 637: Performed by: NURSE PRACTITIONER

## 2021-07-15 PROCEDURE — 250N000012 HC RX MED GY IP 250 OP 636 PS 637: Performed by: STUDENT IN AN ORGANIZED HEALTH CARE EDUCATION/TRAINING PROGRAM

## 2021-07-15 PROCEDURE — 93005 ELECTROCARDIOGRAM TRACING: CPT

## 2021-07-15 PROCEDURE — 200N000002 HC R&B ICU UMMC

## 2021-07-15 PROCEDURE — 258N000003 HC RX IP 258 OP 636: Performed by: THORACIC SURGERY (CARDIOTHORACIC VASCULAR SURGERY)

## 2021-07-15 PROCEDURE — 250N000011 HC RX IP 250 OP 636: Performed by: SURGERY

## 2021-07-15 PROCEDURE — 97530 THERAPEUTIC ACTIVITIES: CPT | Mod: GO | Performed by: OCCUPATIONAL THERAPIST

## 2021-07-15 PROCEDURE — 82805 BLOOD GASES W/O2 SATURATION: CPT | Performed by: SURGERY

## 2021-07-15 PROCEDURE — 250N000011 HC RX IP 250 OP 636: Performed by: STUDENT IN AN ORGANIZED HEALTH CARE EDUCATION/TRAINING PROGRAM

## 2021-07-15 PROCEDURE — 250N000013 HC RX MED GY IP 250 OP 250 PS 637: Performed by: SURGERY

## 2021-07-15 PROCEDURE — 82247 BILIRUBIN TOTAL: CPT | Performed by: THORACIC SURGERY (CARDIOTHORACIC VASCULAR SURGERY)

## 2021-07-15 PROCEDURE — 85018 HEMOGLOBIN: CPT | Performed by: SURGERY

## 2021-07-15 PROCEDURE — 84132 ASSAY OF SERUM POTASSIUM: CPT | Performed by: THORACIC SURGERY (CARDIOTHORACIC VASCULAR SURGERY)

## 2021-07-15 PROCEDURE — 97165 OT EVAL LOW COMPLEX 30 MIN: CPT | Mod: GO | Performed by: OCCUPATIONAL THERAPIST

## 2021-07-15 PROCEDURE — 82330 ASSAY OF CALCIUM: CPT | Performed by: SURGERY

## 2021-07-15 PROCEDURE — 250N000009 HC RX 250: Performed by: THORACIC SURGERY (CARDIOTHORACIC VASCULAR SURGERY)

## 2021-07-15 PROCEDURE — 84100 ASSAY OF PHOSPHORUS: CPT | Performed by: THORACIC SURGERY (CARDIOTHORACIC VASCULAR SURGERY)

## 2021-07-15 RX ORDER — FUROSEMIDE 10 MG/ML
20 INJECTION INTRAMUSCULAR; INTRAVENOUS ONCE
Status: COMPLETED | OUTPATIENT
Start: 2021-07-15 | End: 2021-07-15

## 2021-07-15 RX ORDER — NICOTINE POLACRILEX 4 MG
15-30 LOZENGE BUCCAL
Status: DISCONTINUED | OUTPATIENT
Start: 2021-07-15 | End: 2021-07-22 | Stop reason: HOSPADM

## 2021-07-15 RX ORDER — DEXTROSE MONOHYDRATE 25 G/50ML
25-50 INJECTION, SOLUTION INTRAVENOUS
Status: DISCONTINUED | OUTPATIENT
Start: 2021-07-15 | End: 2021-07-15

## 2021-07-15 RX ORDER — NICOTINE POLACRILEX 4 MG
15-30 LOZENGE BUCCAL
Status: DISCONTINUED | OUTPATIENT
Start: 2021-07-15 | End: 2021-07-15

## 2021-07-15 RX ORDER — POTASSIUM CHLORIDE 29.8 MG/ML
20 INJECTION INTRAVENOUS ONCE
Status: COMPLETED | OUTPATIENT
Start: 2021-07-15 | End: 2021-07-15

## 2021-07-15 RX ORDER — DEXTROSE MONOHYDRATE 25 G/50ML
25-50 INJECTION, SOLUTION INTRAVENOUS
Status: DISCONTINUED | OUTPATIENT
Start: 2021-07-15 | End: 2021-07-22 | Stop reason: HOSPADM

## 2021-07-15 RX ADMIN — ASPIRIN 81 MG CHEWABLE TABLET 324 MG: 81 TABLET CHEWABLE at 07:22

## 2021-07-15 RX ADMIN — DOCUSATE SODIUM 50 MG AND SENNOSIDES 8.6 MG 1 TABLET: 8.6; 5 TABLET, FILM COATED ORAL at 20:04

## 2021-07-15 RX ADMIN — METHOCARBAMOL 750 MG: 750 TABLET, FILM COATED ORAL at 02:07

## 2021-07-15 RX ADMIN — CEFAZOLIN 1 G: 1 INJECTION, POWDER, FOR SOLUTION INTRAMUSCULAR; INTRAVENOUS at 01:06

## 2021-07-15 RX ADMIN — DEXTROSE MONOHYDRATE 25 ML: 500 INJECTION PARENTERAL at 06:03

## 2021-07-15 RX ADMIN — ARIPIPRAZOLE 5 MG: 5 TABLET ORAL at 07:23

## 2021-07-15 RX ADMIN — CALCIUM CHLORIDE 1 G: 100 INJECTION, SOLUTION INTRAVENOUS at 07:39

## 2021-07-15 RX ADMIN — INSULIN ASPART 2 UNITS: 100 INJECTION, SOLUTION INTRAVENOUS; SUBCUTANEOUS at 12:10

## 2021-07-15 RX ADMIN — POTASSIUM CHLORIDE 20 MEQ: 29.8 INJECTION, SOLUTION INTRAVENOUS at 06:09

## 2021-07-15 RX ADMIN — LAMIVUDINE 100 MG: 100 TABLET, FILM COATED ORAL at 07:23

## 2021-07-15 RX ADMIN — ACETAMINOPHEN 975 MG: 325 TABLET, FILM COATED ORAL at 22:09

## 2021-07-15 RX ADMIN — OXYCODONE HYDROCHLORIDE 5 MG: 5 TABLET ORAL at 12:19

## 2021-07-15 RX ADMIN — ROSUVASTATIN CALCIUM 5 MG: 5 TABLET, FILM COATED ORAL at 07:24

## 2021-07-15 RX ADMIN — DOCUSATE SODIUM 50 MG AND SENNOSIDES 8.6 MG 1 TABLET: 8.6; 5 TABLET, FILM COATED ORAL at 07:22

## 2021-07-15 RX ADMIN — GABAPENTIN 100 MG: 100 CAPSULE ORAL at 14:39

## 2021-07-15 RX ADMIN — PREDNISONE 5 MG: 5 TABLET ORAL at 07:23

## 2021-07-15 RX ADMIN — OXYCODONE HYDROCHLORIDE 5 MG: 5 TABLET ORAL at 17:24

## 2021-07-15 RX ADMIN — ACETAMINOPHEN 975 MG: 325 TABLET, FILM COATED ORAL at 14:39

## 2021-07-15 RX ADMIN — FUROSEMIDE 20 MG: 10 INJECTION, SOLUTION INTRAVENOUS at 09:31

## 2021-07-15 RX ADMIN — INSULIN ASPART 1 UNITS: 100 INJECTION, SOLUTION INTRAVENOUS; SUBCUTANEOUS at 17:24

## 2021-07-15 RX ADMIN — ACETAMINOPHEN 975 MG: 325 TABLET, FILM COATED ORAL at 05:50

## 2021-07-15 RX ADMIN — GABAPENTIN 100 MG: 100 CAPSULE ORAL at 20:04

## 2021-07-15 RX ADMIN — OXYCODONE HYDROCHLORIDE 5 MG: 5 TABLET ORAL at 22:09

## 2021-07-15 RX ADMIN — MYCOPHENOLATE MOFETIL 750 MG: 250 CAPSULE ORAL at 07:23

## 2021-07-15 RX ADMIN — MYCOPHENOLATE MOFETIL 750 MG: 250 CAPSULE ORAL at 20:04

## 2021-07-15 RX ADMIN — GABAPENTIN 100 MG: 100 CAPSULE ORAL at 07:23

## 2021-07-15 RX ADMIN — HEPARIN SODIUM 5000 UNITS: 5000 INJECTION, SOLUTION INTRAVENOUS; SUBCUTANEOUS at 12:10

## 2021-07-15 RX ADMIN — SODIUM PHOSPHATE, MONOBASIC, MONOHYDRATE 9 MMOL: 276; 142 INJECTION, SOLUTION INTRAVENOUS at 07:17

## 2021-07-15 RX ADMIN — HEPARIN SODIUM 5000 UNITS: 5000 INJECTION, SOLUTION INTRAVENOUS; SUBCUTANEOUS at 20:05

## 2021-07-15 RX ADMIN — CEFAZOLIN 1 G: 1 INJECTION, POWDER, FOR SOLUTION INTRAMUSCULAR; INTRAVENOUS at 09:33

## 2021-07-15 RX ADMIN — PANTOPRAZOLE SODIUM 40 MG: 40 TABLET, DELAYED RELEASE ORAL at 07:23

## 2021-07-15 ASSESSMENT — ACTIVITIES OF DAILY LIVING (ADL)
ADLS_ACUITY_SCORE: 16
ADLS_ACUITY_SCORE: 16
PREVIOUS_RESPONSIBILITIES: MEAL PREP;HOUSEKEEPING;LAUNDRY;SHOPPING;MEDICATION MANAGEMENT
ADLS_ACUITY_SCORE: 16

## 2021-07-15 ASSESSMENT — MIFFLIN-ST. JEOR: SCORE: 1585.38

## 2021-07-15 NOTE — PROGRESS NOTES
CLINICAL NUTRITION SERVICES - BRIEF NOTE    Received provider consult for nutrition education with comments post op cardiovascular surgery (automatic consult on post-op order set). S/p CABGx2 on 7/14. Nutrition education to be completed as able/appropriate. Written information added to discharge instructions.     RD will follow per LOS protocol or if re-consulted.     Katrina Cr RD, LD  [id number]  Pgr: [phone]

## 2021-07-15 NOTE — PROGRESS NOTES
07/15/21 1600   Quick Adds   Type of Visit Initial Occupational Therapy Evaluation   Living Environment   People in home alone   Current Living Arrangements house   Home Accessibility stairs to enter home;stairs within home   Number of Stairs, Main Entrance 8  (from garage)   Stair Railings, Main Entrance railings on both sides of stairs   Number of Stairs, Within Home, Primary other (see comments)  (16 to bedroom)   Stair Railings, Within Home, Primary railings on both sides of stairs   Transportation Anticipated car, drives self;public transportation   Living Environment Comments Pt reports living in town home, stairs to enter and stairs within home.  Has bathroom on each level   Self-Care   Usual Activity Tolerance good   Current Activity Tolerance moderate   Regular Exercise No   Activity/Exercise/Self-Care Comment Pt reports previously independent with ADL and IADL, prior to surgery   Instrumental Activities of Daily Living (IADL)   Previous Responsibilities meal prep;housekeeping;laundry;shopping;medication management   Disability/Function   Hearing Difficulty or Deaf no   Use of hearing assistive devices none   Wear Glasses or Blind yes   Vision Management wears glasses   Concentrating, Remembering or Making Decisions Difficulty no   Difficulty Communicating no   Difficulty Eating/Swallowing no   Walking or Climbing Stairs Difficulty no   Dressing/Bathing Difficulty no   Toileting issues no   Doing Errands Independently Difficulty (such as shopping) no   Fall history within last six months no   Change in Functional Status Since Onset of Current Illness/Injury yes   General Information   Onset of Illness/Injury or Date of Surgery 07/14/21   Referring Physician Tila Morales   Patient/Family Therapy Goal Statement (OT) To get home   Additional Occupational Profile Info/Pertinent History of Current Problem Frandy Workman is a 56 year old male with history of HTN, HLD, DM2, chronic anemia, stage 3 CKD, HCC  and ESTRADA cirrhosis s/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 Zapata on 7/14/2021.   Existing Precautions/Restrictions sternal   Cognitive Status Examination   Orientation Status orientation to person, place and time   Affect/Mental Status (Cognitive) WFL   Follows Commands WFL   Visual Perception   Visual Impairment/Limitations WFL   Pain Assessment   Patient Currently in Pain No   Integumentary/Edema   Integumentary/Edema no deficits were identifed   Range of Motion Comprehensive   Comment, General Range of Motion B UE WFL   Strength Comprehensive (MMT)   Comment, General Manual Muscle Testing (MMT) Assessment B UE WFL, not formally assesed due to precautions   Coordination   Upper Extremity Coordination No deficits were identified   Bed Mobility   Comment (Bed Mobility) SBA and cueing for technique within precautions   Clinical Impression   Criteria for Skilled Therapeutic Interventions Met (OT) yes;meets criteria;skilled treatment is necessary   OT Diagnosis decreased ADL independence   OT Problem List-Impairments impacting ADL post-surgical precautions;pain;strength;activity tolerance impaired   Assessment of Occupational Performance 3-5 Performance Deficits   Identified Performance Deficits dressing, bathing, toileting, meal prep, home mgmt   Planned Therapy Interventions (OT) ADL retraining;IADL retraining;home program guidelines;transfer training;strengthening;progressive activity/exercise   Clinical Decision Making Complexity (OT) low complexity   Therapy Frequency (OT) Daily   Predicted Duration of Therapy 1-2 weeks   Anticipated Equipment Needs Upon Discharge (OT) shower chair;reacher;raised toilet seat   Risk & Benefits of therapy have been explained evaluation/treatment results reviewed;patient;care  plan/treatment goals reviewed;risks/benefits reviewed;current/potential barriers reviewed   Comment-Clinical Impression Pt presents with post surgical sternal precautions resulting in decreased activity tolerance and indpeendence with ADL.  Pt will benefit from skilled OT services to increase activity tolerance and independence with ADL   OT Discharge Planning    OT Discharge Recommendation (DC Rec) Home with assist;home with outpatient cardiac rehab   OT Rationale for DC Rec Pt moving well and anticipate being able to discharge home with OP CR   Total Evaluation Time (Minutes)   Total Evaluation Time (Minutes) 5

## 2021-07-15 NOTE — PROGRESS NOTES
CV ICU PROGRESS NOTE    ASSESSMENT: Frandy Workman is a 56 year old male with history of HTN, HLD, DM2, chronic anemia, stage 3 CKD, HCC and ESTRADA cirrhosis s/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 Zapata on 7/14/2021.    =============================================PLAN====================================================    Today's changes:  - wean epinephrine to off  - likely resume Coreg tomorrow once off pressors  - EKG this am similar to post-op, check troponin  - d/c insulin gtt, start high dose insulin sliding scale  - Lasix 20mg x1  - remove MAC and arterial line  - transfer to floor    Neuro/ pain/ sedation:  Post-op pain   MDD, Bipolar I  - Pain: scheduled tylenol; PRN oxycodone, PRN IV hydromorphone, PRN Robaxin  - PTA aripiprazole     Pulmonary care:   Postoperative respiratory insufficiency  - Supplemental oxygen to keep saturation above 92 %.  - on RA  - Incentive spirometer every when awake.  - daily CXR    Cardiovascular:    CAD with NSTEMI (90% stenosis mid-LM) now s/p CABGx2 7/14/2021  HTN, HLD, T2DM  - echo at end of case w/ EF 50-55%  - MAP >65   - Pressors: Epi 0.03- wean off  - aspirin 81 mg qday POD1  - hold PTA coreg 3.125 mg BID- will start once BP stable off epi  - PTA rosuvastatin 5mg qday (max dose i/s/o immunosuppression)  - troponin    GI care:   ESTRADA cirrhosis s/p liver transplant 11/2019  HCC s/p TACE  H/o gastric ulcers w/o CMV or H Pylori  - advance diet as tolerated  - docusate and senna BID   - Protonix ppx  - PTA MMF 750mg BID, prednisone 5mg qday    Renal/ Fluid, Electrolytes, and nutrition:    Stage 3 CKD, b/l creat ~1.6  - monitor intake and output.  - Electrolyte replacement protocol  - PTA flomax  - Lasix 20mg x1    Endocrine:    Stress hyperglycemia  T2DM c/b  "retinopathy  - high dose ISS  - hold PTA jardiance, PTA eylea, Novolog with meals, Tresiba    ID/ Antibiotics:  H/o HIV  - perioperative antibiotics: Ancef  - PTA lamivudine    Heme:     Chronic anemia (followed outpatient by anemia clinic)  - Hemoglobin 7.3 (7.8), will hold off on transfusing at this time   - appreciate hematology recs    Prophylaxis:    - SCD  - PPI  - SQH    Lines/ tubes/ drains:  - L IJ MAC/swan- remove today  - left radial arterial line- remove today  - Carroll  - Chest tubes: x3 (2 x meds, 1 x L pleural)    Disposition:  - CVICU     Patient seen, findings and plan discussed with CV ICU staff, Dr. Cruz.    Dara Livingston MD  Surgery, PGY3  Resident   [id number]      =====================================      SUBJECTIVE:  Naeo. \"Muscular\" chest pain this am that feels similar to pain that initially brought him to hospital. No shortness of breath. No arm or jaw pain. No nausea. Has not yet eaten.    OBJECTIVE:   1. VITAL SIGNS:   Temp:  [97.9  F (36.6  C)-99.7  F (37.6  C)] 98.2  F (36.8  C)  Pulse:  [] 87  Resp:  [16-20] 16  MAP:  [59 mmHg-99 mmHg] 64 mmHg  Arterial Line BP: (102-157)/(41-67) 116/45  SpO2:  [93 %-100 %] 95 %  Resp: 16      2. INTAKE/ OUTPUT:   I/O last 3 completed shifts:  In: 6906.87 [P.O.:1420; I.V.:3244.87; Other:142; IV Piggyback:500]  Out: 3430 [Urine:1950; Blood:1020; Chest Tube:460]    3. PHYSICAL EXAMINATION:   General: NAD  Neuro: awake, alert, answers questions appropriately, moves all four extremities  Resp: Breathing non-labored on RA, CTAB  Chest/CV: RRR. CT x3, no leak  Abdomen: Soft, non-distended, non-tender  Extremities: warm and well perfused.    4. INVESTIGATIONS:   Arterial Blood Gases   Recent Labs   Lab 07/14/21  1605 07/14/21  1351 07/14/21  1252 07/14/21  1216   PH 7.37 7.29* 7.36 7.38   PCO2 37 44 35 36   PO2 87 145* 181* 306*   HCO3 21 21 20* 21     Complete Blood Count   Recent Labs   Lab 07/15/21  0425 07/14/21  2049 07/14/21  1351 " 07/14/21  1252 07/14/21  1215 07/14/21  0337   WBC 8.7 7.9 7.2  --   --  9.1   HGB 7.3* 7.8* 8.6* 8.4*  --  7.8*   * 114* 71*  --  51* 110*     Basic Metabolic Panel  Recent Labs   Lab 07/15/21  0752 07/15/21  0618 07/15/21  0558 07/15/21  0425 07/14/21  2049 07/14/21  1352 07/14/21  1252 07/14/21  0908 07/14/21  0337   NA  --   --   --  140 143 141 140  --  136   POTASSIUM  --   --   --  3.8 4.3 4.2  4.2 4.7  --  3.9   CHLORIDE  --   --   --  109 111* 108  --   --  105   CO2  --   --   --  24 23 22  --   --  21   BUN  --   --   --  32* 32* 30  --   --  29   CR  --   --   --  1.62* 1.48* 1.24  --   --  1.40*   * 183* 49* 139* 113* 150* 160*   < > 141*    < > = values in this interval not displayed.     Liver Function Tests  Recent Labs   Lab 07/15/21  0425 07/14/21  2049 07/14/21  1352 07/14/21  1351 07/14/21  1215 07/12/21  1608 07/12/21  1036   AST 22 30 29  --   --   --  8   ALT 15 17 15  --   --   --  20   ALKPHOS 60 62 64  --   --   --  100   BILITOTAL 0.5 0.6 1.2  --   --   --  0.7   ALBUMIN 3.1* 3.3* 3.0*  --   --   --  4.1   INR  --   --   --  1.28* 1.45* 0.96  --      Pancreatic Enzymes  No lab results found in last 7 days.  Coagulation Profile  Recent Labs   Lab 07/14/21  1351 07/14/21 1215 07/12/21  1608   INR 1.28* 1.45* 0.96   PTT 46* 37  --          5. RADIOLOGY:   Recent Results (from the past 24 hour(s))   XR Chest Port 1 View    Narrative    EXAM: XR CHEST PORT 1 VIEW  7/14/2021 3:23 PM     HISTORY:  Post Op CVTS Surgery       COMPARISON:  7/14/2021 at 1:20 AM    FINDINGS:   Portable frontal radiograph of the chest. Patient is rotated to the  right. The right costophrenic angle is partially collimated off the  field-of-view. New postsurgical changes of coronary artery bypass  grafting. Median sternotomy wires are intact. Left chest tubes in  place. Multiple mediastinal drains. Left IJ [place]-Matheus catheter with  tip projecting over the right main pulmonary artery. Stable right IJ  CVC.  The cardiac silhouette size is unchanged. Increased streaky  perihilar and bibasilar opacities. Tiny left apical pneumothorax.  Widening of the mediastinum. Postsurgical clips project over the right  upper quadrant and epigastric region.      Impression    IMPRESSION:   1. New postsurgical changes of coronary artery bypass grafting with  tiny left apical pneumothorax and left chest tube in place. Widening  of the mediastinum, likely postoperative.  2. Right IJ [place]-Matheus catheter with tip projecting over the right main  pulmonary artery.  3. Increased streaky bibasilar and perihilar opacities, atelectasis  versus edema.    I have personally reviewed the examination and initial interpretation  and I agree with the findings.    SOPHIA HERNADEZ MD         SYSTEM ID:  [id number]       =========================================

## 2021-07-15 NOTE — PLAN OF CARE
Major Shift Events:  Stable chadd numbers. Advanced to regular diet. Potassium replaced. Glucose supplemented during hypoglycemic episode.  Plan: Remove invasive lines if able. Ambulate/PT/OT.  For vital signs and complete assessments, please see documentation flowsheets.

## 2021-07-16 ENCOUNTER — APPOINTMENT (OUTPATIENT)
Dept: GENERAL RADIOLOGY | Facility: CLINIC | Age: 56
DRG: 234 | End: 2021-07-16
Payer: MEDICARE

## 2021-07-16 ENCOUNTER — APPOINTMENT (OUTPATIENT)
Dept: OCCUPATIONAL THERAPY | Facility: CLINIC | Age: 56
DRG: 234 | End: 2021-07-16
Payer: MEDICARE

## 2021-07-16 LAB
ANION GAP SERPL CALCULATED.3IONS-SCNC: 6 MMOL/L (ref 3–14)
BUN SERPL-MCNC: 32 MG/DL (ref 7–30)
CALCIUM SERPL-MCNC: 8.1 MG/DL (ref 8.5–10.1)
CHLORIDE BLD-SCNC: 109 MMOL/L (ref 94–109)
CO2 SERPL-SCNC: 23 MMOL/L (ref 20–32)
CREAT SERPL-MCNC: 1.49 MG/DL (ref 0.66–1.25)
ERYTHROCYTE [DISTWIDTH] IN BLOOD BY AUTOMATED COUNT: 16.6 % (ref 10–15)
GFR SERPL CREATININE-BSD FRML MDRD: 51 ML/MIN/1.73M2
GLUCOSE BLD-MCNC: 171 MG/DL (ref 70–99)
GLUCOSE BLDC GLUCOMTR-MCNC: 153 MG/DL (ref 70–99)
GLUCOSE BLDC GLUCOMTR-MCNC: 175 MG/DL (ref 70–99)
GLUCOSE BLDC GLUCOMTR-MCNC: 223 MG/DL (ref 70–99)
GLUCOSE BLDC GLUCOMTR-MCNC: 241 MG/DL (ref 70–99)
GLUCOSE BLDC GLUCOMTR-MCNC: 305 MG/DL (ref 70–99)
HCT VFR BLD AUTO: 22 % (ref 40–53)
HGB BLD-MCNC: 7 G/DL (ref 13.3–17.7)
MAGNESIUM SERPL-MCNC: 2.8 MG/DL (ref 1.6–2.3)
MCH RBC QN AUTO: 29.7 PG (ref 26.5–33)
MCHC RBC AUTO-ENTMCNC: 31.8 G/DL (ref 31.5–36.5)
MCV RBC AUTO: 93 FL (ref 78–100)
PHOSPHATE SERPL-MCNC: 2.2 MG/DL (ref 2.5–4.5)
PLATELET # BLD AUTO: 88 10E3/UL (ref 150–450)
POTASSIUM BLD-SCNC: 4 MMOL/L (ref 3.4–5.3)
POTASSIUM BLD-SCNC: 4.2 MMOL/L (ref 3.4–5.3)
RBC # BLD AUTO: 2.36 10E6/UL (ref 4.4–5.9)
SODIUM SERPL-SCNC: 138 MMOL/L (ref 133–144)
WBC # BLD AUTO: 5.7 10E3/UL (ref 4–11)

## 2021-07-16 PROCEDURE — 97535 SELF CARE MNGMENT TRAINING: CPT | Mod: GO

## 2021-07-16 PROCEDURE — 84100 ASSAY OF PHOSPHORUS: CPT | Performed by: THORACIC SURGERY (CARDIOTHORACIC VASCULAR SURGERY)

## 2021-07-16 PROCEDURE — 250N000012 HC RX MED GY IP 250 OP 636 PS 637: Performed by: STUDENT IN AN ORGANIZED HEALTH CARE EDUCATION/TRAINING PROGRAM

## 2021-07-16 PROCEDURE — 71045 X-RAY EXAM CHEST 1 VIEW: CPT

## 2021-07-16 PROCEDURE — 82310 ASSAY OF CALCIUM: CPT | Performed by: STUDENT IN AN ORGANIZED HEALTH CARE EDUCATION/TRAINING PROGRAM

## 2021-07-16 PROCEDURE — 85027 COMPLETE CBC AUTOMATED: CPT | Performed by: STUDENT IN AN ORGANIZED HEALTH CARE EDUCATION/TRAINING PROGRAM

## 2021-07-16 PROCEDURE — 86900 BLOOD TYPING SEROLOGIC ABO: CPT | Performed by: STUDENT IN AN ORGANIZED HEALTH CARE EDUCATION/TRAINING PROGRAM

## 2021-07-16 PROCEDURE — 250N000013 HC RX MED GY IP 250 OP 250 PS 637: Performed by: STUDENT IN AN ORGANIZED HEALTH CARE EDUCATION/TRAINING PROGRAM

## 2021-07-16 PROCEDURE — 999N000045 HC STATISTIC DAILY SWAN MONITORING

## 2021-07-16 PROCEDURE — 83735 ASSAY OF MAGNESIUM: CPT | Performed by: THORACIC SURGERY (CARDIOTHORACIC VASCULAR SURGERY)

## 2021-07-16 PROCEDURE — 200N000002 HC R&B ICU UMMC

## 2021-07-16 PROCEDURE — 250N000013 HC RX MED GY IP 250 OP 250 PS 637: Performed by: THORACIC SURGERY (CARDIOTHORACIC VASCULAR SURGERY)

## 2021-07-16 PROCEDURE — 71045 X-RAY EXAM CHEST 1 VIEW: CPT | Mod: 26 | Performed by: RADIOLOGY

## 2021-07-16 PROCEDURE — 36415 COLL VENOUS BLD VENIPUNCTURE: CPT | Performed by: STUDENT IN AN ORGANIZED HEALTH CARE EDUCATION/TRAINING PROGRAM

## 2021-07-16 PROCEDURE — 250N000013 HC RX MED GY IP 250 OP 250 PS 637: Performed by: SURGERY

## 2021-07-16 PROCEDURE — 97110 THERAPEUTIC EXERCISES: CPT | Mod: GO

## 2021-07-16 PROCEDURE — 999N000155 HC STATISTIC RAPCV CVP MONITORING

## 2021-07-16 PROCEDURE — 80048 BASIC METABOLIC PNL TOTAL CA: CPT | Performed by: THORACIC SURGERY (CARDIOTHORACIC VASCULAR SURGERY)

## 2021-07-16 PROCEDURE — 999N000015 HC STATISTIC ARTERIAL MONITORING DAILY

## 2021-07-16 PROCEDURE — 36415 COLL VENOUS BLD VENIPUNCTURE: CPT | Performed by: THORACIC SURGERY (CARDIOTHORACIC VASCULAR SURGERY)

## 2021-07-16 PROCEDURE — 250N000011 HC RX IP 250 OP 636: Performed by: SURGERY

## 2021-07-16 RX ORDER — CARVEDILOL 3.12 MG/1
3.12 TABLET ORAL 2 TIMES DAILY WITH MEALS
Status: DISCONTINUED | OUTPATIENT
Start: 2021-07-16 | End: 2021-07-18

## 2021-07-16 RX ADMIN — OXYCODONE HYDROCHLORIDE 5 MG: 5 TABLET ORAL at 03:40

## 2021-07-16 RX ADMIN — GABAPENTIN 100 MG: 100 CAPSULE ORAL at 19:50

## 2021-07-16 RX ADMIN — DOCUSATE SODIUM 50 MG AND SENNOSIDES 8.6 MG 1 TABLET: 8.6; 5 TABLET, FILM COATED ORAL at 09:23

## 2021-07-16 RX ADMIN — HEPARIN SODIUM 5000 UNITS: 5000 INJECTION, SOLUTION INTRAVENOUS; SUBCUTANEOUS at 12:59

## 2021-07-16 RX ADMIN — POTASSIUM & SODIUM PHOSPHATES POWDER PACK 280-160-250 MG 1 PACKET: 280-160-250 PACK at 09:21

## 2021-07-16 RX ADMIN — INSULIN ASPART 6 UNITS: 100 INJECTION, SOLUTION INTRAVENOUS; SUBCUTANEOUS at 16:55

## 2021-07-16 RX ADMIN — PANTOPRAZOLE SODIUM 40 MG: 40 TABLET, DELAYED RELEASE ORAL at 09:22

## 2021-07-16 RX ADMIN — MYCOPHENOLATE MOFETIL 750 MG: 250 CAPSULE ORAL at 19:50

## 2021-07-16 RX ADMIN — HEPARIN SODIUM 5000 UNITS: 5000 INJECTION, SOLUTION INTRAVENOUS; SUBCUTANEOUS at 03:42

## 2021-07-16 RX ADMIN — GABAPENTIN 100 MG: 100 CAPSULE ORAL at 09:18

## 2021-07-16 RX ADMIN — ASPIRIN 81 MG CHEWABLE TABLET 324 MG: 81 TABLET CHEWABLE at 09:20

## 2021-07-16 RX ADMIN — ROSUVASTATIN CALCIUM 5 MG: 5 TABLET, FILM COATED ORAL at 09:21

## 2021-07-16 RX ADMIN — OXYCODONE HYDROCHLORIDE 5 MG: 5 TABLET ORAL at 19:49

## 2021-07-16 RX ADMIN — ACETAMINOPHEN 975 MG: 325 TABLET, FILM COATED ORAL at 13:22

## 2021-07-16 RX ADMIN — MYCOPHENOLATE MOFETIL 750 MG: 250 CAPSULE ORAL at 09:18

## 2021-07-16 RX ADMIN — PREDNISONE 5 MG: 5 TABLET ORAL at 09:19

## 2021-07-16 RX ADMIN — DOCUSATE SODIUM 50 MG AND SENNOSIDES 8.6 MG 1 TABLET: 8.6; 5 TABLET, FILM COATED ORAL at 19:50

## 2021-07-16 RX ADMIN — INSULIN ASPART 5 UNITS: 100 INJECTION, SOLUTION INTRAVENOUS; SUBCUTANEOUS at 14:36

## 2021-07-16 RX ADMIN — ACETAMINOPHEN 975 MG: 325 TABLET, FILM COATED ORAL at 07:04

## 2021-07-16 RX ADMIN — CARVEDILOL 3.12 MG: 3.12 TABLET, FILM COATED ORAL at 09:42

## 2021-07-16 RX ADMIN — ACETAMINOPHEN 975 MG: 325 TABLET, FILM COATED ORAL at 22:11

## 2021-07-16 RX ADMIN — HEPARIN SODIUM 5000 UNITS: 5000 INJECTION, SOLUTION INTRAVENOUS; SUBCUTANEOUS at 19:50

## 2021-07-16 RX ADMIN — ARIPIPRAZOLE 5 MG: 5 TABLET ORAL at 09:19

## 2021-07-16 RX ADMIN — INSULIN ASPART 1 UNITS: 100 INJECTION, SOLUTION INTRAVENOUS; SUBCUTANEOUS at 09:43

## 2021-07-16 RX ADMIN — POTASSIUM & SODIUM PHOSPHATES POWDER PACK 280-160-250 MG 1 PACKET: 280-160-250 PACK at 13:21

## 2021-07-16 RX ADMIN — GABAPENTIN 100 MG: 100 CAPSULE ORAL at 13:21

## 2021-07-16 RX ADMIN — LAMIVUDINE 100 MG: 100 TABLET, FILM COATED ORAL at 09:42

## 2021-07-16 RX ADMIN — CARVEDILOL 3.12 MG: 3.12 TABLET, FILM COATED ORAL at 18:11

## 2021-07-16 RX ADMIN — POTASSIUM & SODIUM PHOSPHATES POWDER PACK 280-160-250 MG 1 PACKET: 280-160-250 PACK at 19:50

## 2021-07-16 ASSESSMENT — ACTIVITIES OF DAILY LIVING (ADL)
ADLS_ACUITY_SCORE: 16

## 2021-07-16 NOTE — PROGRESS NOTES
[id number]  Shift Events> Pt. Alert and oriented x4, on RA, SR pacer wire capped and chest tubes to suction. PRN Oxycodone given 1x for pain. Bladder scanned showed 153 mL. Continue to monitor urine output and bladder scan per order.   Plan: Continue to monitor, transfer to 6C when bed available.  For Vital signs and assessments, please see documentation flowsheet.

## 2021-07-16 NOTE — PLAN OF CARE
Major shift events: AOx4. Neuro intact. Afebrile. Pain 3-4 and well-controlled with Tylenol. Up to chair x2 with SBA. >99% sats RA. SR 80s, MAPs >70. hbg stable, ~7. Phos 2.2, replaced per protocol. Started PTA coreg. No BM. Passing gas. 2g Na diet. Good appetite. Hyperglycemic with BG correction only. Additional insulin given per pt's home plan to account for carbs. Final .Chest tube 140 out. Voiding well via urinal, bedside commode.     Plan: transfer to floor when bed available.

## 2021-07-16 NOTE — PROGRESS NOTES
CV ICU PROGRESS NOTE    ASSESSMENT: Frandy Workman is a 56 year old male with history of HTN, HLD, DM2, chronic anemia, stage 3 CKD, HCC and ESTRADA cirrhosis s/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 Zapata on 7/14/2021.    =============================================PLAN====================================================    Today's changes:  - resume PTA Coreg today   - remains floor status    Neuro/ pain/ sedation:  Post-op pain   MDD, Bipolar I  - Pain: scheduled tylenol; PRN oxycodone, PRN IV hydromorphone, PRN Robaxin  - PTA aripiprazole     Pulmonary care:   Postoperative respiratory insufficiency  - Supplemental oxygen to keep saturation above 92 %.  - on RA  - Incentive spirometer every when awake.  - daily CXR    Cardiovascular:    CAD with NSTEMI (90% stenosis mid-LM) now s/p CABGx2 7/14/2021  HTN, HLD, T2DM  - echo at end of case w/ EF 50-55%  - goal MAP >65   - aspirin 324 mg qday  - PTA coreg 3.125 mg BID  - PTA rosuvastatin 5mg qday (max dose i/s/o immunosuppression)    GI care:   ESTRADA cirrhosis s/p liver transplant 11/2019  HCC s/p TACE  H/o gastric ulcers w/o CMV or H Pylori  - low Na diet  - docusate and senna BID   - Protonix ppx  - PTA MMF 750mg BID, prednisone 5mg qday    Renal/ Fluid, Electrolytes, and nutrition:    Stage 3 CKD, b/l creat ~1.6  - monitor intake and output.  - Electrolyte replacement protocol  - PTA flomax    Endocrine:    Stress hyperglycemia  T2DM c/b retinopathy  - high dose ISS  - hold PTA jardiance, PTA eylea, Novolog with meals, Tresiba    ID/ Antibiotics:  H/o HIV  - perioperative antibiotics: Ancef  - PTA lamivudine    Heme:     Chronic anemia (followed outpatient by anemia clinic)  - HGB stable, ~7.2  - appreciate hematology recs    Prophylaxis:    - SCD  - PPI  -  [facility]    Lines/ tubes/ drains:  - Carroll  - Chest tubes: x3 (2 x meds, 1 x L pleural)    Disposition:  - Floor status    Patient seen, findings and plan discussed with CV ICU staff, Dr. Cruz.    Dara Livingston MD  Surgery, PGY3  Resident   [id number]      =====================================      SUBJECTIVE:  Pain adequately controlled. Straight cath x1 overnight.    OBJECTIVE:   1. VITAL SIGNS:   Temp:  [98.2  F (36.8  C)-98.8  F (37.1  C)] 98.8  F (37.1  C)  Pulse:  [77-99] 97  Resp:  [16-20] 16  BP: ()/(52-92) 112/64  SpO2:  [92 %-99 %] 97 %  Resp: 16      2. INTAKE/ OUTPUT:   I/O last 3 completed shifts:  In: 1299.75 [P.O.:800; I.V.:499.75]  Out: 1340 [Urine:1020; Chest Tube:320]    3. PHYSICAL EXAMINATION:   General: NAD  Neuro: awake, alert, moves all four extremities  Resp: NLB on RA  Chest/CV: RRR. CT x3 s/s  Abdomen: Soft, non-distended  Extremities: warm and well perfused.    4. INVESTIGATIONS:   Arterial Blood Gases   Recent Labs   Lab 07/14/21  1605 07/14/21  1351 07/14/21  1252 07/14/21  1216   PH 7.37 7.29* 7.36 7.38   PCO2 37 44 35 36   PO2 87 145* 181* 306*   HCO3 21 21 20* 21     Complete Blood Count   Recent Labs   Lab 07/15/21  1320 07/15/21  0425 07/14/21  2049 07/14/21  1351 07/14/21  1215 07/14/21  0337   WBC  --  8.7 7.9 7.2  --  9.1   HGB 7.2* 7.3* 7.8* 8.6*  --  7.8*   PLT  --  132* 114* 71* 51* 110*     Basic Metabolic Panel  Recent Labs   Lab 07/16/21  0727 07/16/21  0654 07/15/21  2212 07/15/21  1714 07/15/21  1320 07/15/21  1208 07/15/21  0425 07/14/21 2049 07/14/21  1352     --   --   --   --   --  140 143 141   POTASSIUM 4.2 4.0  --   --  4.2  --  3.8 4.3 4.2  4.2   CHLORIDE 109  --   --   --   --   --  109 111* 108   CO2 23  --   --   --   --   --  24 23 22   BUN 32*  --   --   --   --   --  32* 32* 30   CR 1.49*  --   --   --   --   --  1.62* 1.48* 1.24   *  --  188* 188*  --  224* 139* 113* 150*     Liver Function Tests  Recent Labs   Lab 07/15/21  8761  07/14/21 2049 07/14/21  1352 07/14/21  1351 07/14/21  1215 07/12/21  1608 07/12/21  1036   AST 22 30 29  --   --   --  8   ALT 15 17 15  --   --   --  20   ALKPHOS 60 62 64  --   --   --  100   BILITOTAL 0.5 0.6 1.2  --   --   --  0.7   ALBUMIN 3.1* 3.3* 3.0*  --   --   --  4.1   INR  --   --   --  1.28* 1.45* 0.96  --      Pancreatic Enzymes  No lab results found in last 7 days.  Coagulation Profile  Recent Labs   Lab 07/14/21  1351 07/14/21  1215 07/12/21  1608   INR 1.28* 1.45* 0.96   PTT 46* 37  --          5. RADIOLOGY:   No results found for this or any previous visit (from the past 24 hour(s)).    =========================================

## 2021-07-16 NOTE — SIGNIFICANT EVENT
SPIRITUAL HEALTH SERVICES Significant Event  [employer] of ANOINTING  [facility] 4e    Pt anointed by Father Daylin Aguilera   Pager [phone]

## 2021-07-16 NOTE — PLAN OF CARE
Assumed care at 2pm    Major Shift Events:  Pt Vss. Up to chair and ambulated in the hallway. Mild chest pain(4) controlled with Oxycodone po 5mg prn. Bladder scan with Retention of urine greater than 450 noted at 1830pm after voiding 375ml at 11am. Plan for Straight cath. RIJCL and LIJ [place] and Art line all removed.    Plan: Star[place] Cath. Strict I&O's. Comfort and Safety. Tele transfer.    For vital signs and complete assessments, please see documentation flowsheets.

## 2021-07-16 NOTE — PROGRESS NOTES
[id number]  Major Shift Events: pt is alert and oriented x4, on RA, SR pacer wire capped, chest tube to suction, PRN oxycodone given for pain. Unable to void, straight cath @ 0815; 550 ml out.  Plan: continue to monitor, transfer to 6C when bed available.  For vital signs and complete assessments, please see documentation flowsheets.

## 2021-07-17 ENCOUNTER — APPOINTMENT (OUTPATIENT)
Dept: OCCUPATIONAL THERAPY | Facility: CLINIC | Age: 56
DRG: 234 | End: 2021-07-17
Payer: MEDICARE

## 2021-07-17 ENCOUNTER — APPOINTMENT (OUTPATIENT)
Dept: GENERAL RADIOLOGY | Facility: CLINIC | Age: 56
DRG: 234 | End: 2021-07-17
Payer: MEDICARE

## 2021-07-17 LAB
ABO/RH(D): NORMAL
ALBUMIN SERPL-MCNC: 2.7 G/DL (ref 3.4–5)
ALP SERPL-CCNC: 78 U/L (ref 40–150)
ALT SERPL W P-5'-P-CCNC: 10 U/L (ref 0–70)
ANION GAP SERPL CALCULATED.3IONS-SCNC: 9 MMOL/L (ref 3–14)
ANTIBODY SCREEN: NEGATIVE
AST SERPL W P-5'-P-CCNC: 12 U/L (ref 0–45)
BILIRUB SERPL-MCNC: 0.5 MG/DL (ref 0.2–1.3)
BLD PROD TYP BPU: NORMAL
BLD PROD TYP BPU: NORMAL
BLOOD COMPONENT TYPE: NORMAL
BLOOD COMPONENT TYPE: NORMAL
BUN SERPL-MCNC: 30 MG/DL (ref 7–30)
CALCIUM SERPL-MCNC: 7.8 MG/DL (ref 8.5–10.1)
CHLORIDE BLD-SCNC: 108 MMOL/L (ref 94–109)
CO2 SERPL-SCNC: 23 MMOL/L (ref 20–32)
CODING SYSTEM: NORMAL
CODING SYSTEM: NORMAL
CREAT SERPL-MCNC: 1.24 MG/DL (ref 0.66–1.25)
CROSSMATCH: NORMAL
CROSSMATCH: NORMAL
ERYTHROCYTE [DISTWIDTH] IN BLOOD BY AUTOMATED COUNT: 16.3 % (ref 10–15)
GFR SERPL CREATININE-BSD FRML MDRD: 64 ML/MIN/1.73M2
GLUCOSE BLD-MCNC: 137 MG/DL (ref 70–99)
GLUCOSE BLDC GLUCOMTR-MCNC: 168 MG/DL (ref 70–99)
HCT VFR BLD AUTO: 21 % (ref 40–53)
HGB BLD-MCNC: 6.6 G/DL (ref 13.3–17.7)
ISSUE DATE AND TIME: NORMAL
MAGNESIUM SERPL-MCNC: 2.8 MG/DL (ref 1.6–2.3)
MCH RBC QN AUTO: 29.5 PG (ref 26.5–33)
MCHC RBC AUTO-ENTMCNC: 31.4 G/DL (ref 31.5–36.5)
MCV RBC AUTO: 94 FL (ref 78–100)
PHOSPHATE SERPL-MCNC: 1.8 MG/DL (ref 2.5–4.5)
PHOSPHATE SERPL-MCNC: 2.9 MG/DL (ref 2.5–4.5)
PLATELET # BLD AUTO: 87 10E3/UL (ref 150–450)
POTASSIUM BLD-SCNC: 4 MMOL/L (ref 3.4–5.3)
PROT SERPL-MCNC: 6.2 G/DL (ref 6.8–8.8)
RBC # BLD AUTO: 2.24 10E6/UL (ref 4.4–5.9)
SODIUM SERPL-SCNC: 140 MMOL/L (ref 133–144)
UNIT ABO/RH: NORMAL
UNIT ABO/RH: NORMAL
UNIT NUMBER: NORMAL
UNIT NUMBER: NORMAL
UNIT STATUS: NORMAL
UNIT STATUS: NORMAL
UNIT TYPE ISBT: 5100
UNIT TYPE ISBT: 5100
WBC # BLD AUTO: 4.9 10E3/UL (ref 4–11)

## 2021-07-17 PROCEDURE — 36415 COLL VENOUS BLD VENIPUNCTURE: CPT | Performed by: INTERNAL MEDICINE

## 2021-07-17 PROCEDURE — 84100 ASSAY OF PHOSPHORUS: CPT | Performed by: THORACIC SURGERY (CARDIOTHORACIC VASCULAR SURGERY)

## 2021-07-17 PROCEDURE — 97530 THERAPEUTIC ACTIVITIES: CPT | Mod: GO

## 2021-07-17 PROCEDURE — 84100 ASSAY OF PHOSPHORUS: CPT | Performed by: INTERNAL MEDICINE

## 2021-07-17 PROCEDURE — 71045 X-RAY EXAM CHEST 1 VIEW: CPT

## 2021-07-17 PROCEDURE — 36415 COLL VENOUS BLD VENIPUNCTURE: CPT | Performed by: STUDENT IN AN ORGANIZED HEALTH CARE EDUCATION/TRAINING PROGRAM

## 2021-07-17 PROCEDURE — 250N000013 HC RX MED GY IP 250 OP 250 PS 637: Performed by: SURGERY

## 2021-07-17 PROCEDURE — 83735 ASSAY OF MAGNESIUM: CPT | Performed by: STUDENT IN AN ORGANIZED HEALTH CARE EDUCATION/TRAINING PROGRAM

## 2021-07-17 PROCEDURE — 250N000012 HC RX MED GY IP 250 OP 636 PS 637: Performed by: STUDENT IN AN ORGANIZED HEALTH CARE EDUCATION/TRAINING PROGRAM

## 2021-07-17 PROCEDURE — 71045 X-RAY EXAM CHEST 1 VIEW: CPT | Mod: 26 | Performed by: RADIOLOGY

## 2021-07-17 PROCEDURE — 85027 COMPLETE CBC AUTOMATED: CPT | Performed by: STUDENT IN AN ORGANIZED HEALTH CARE EDUCATION/TRAINING PROGRAM

## 2021-07-17 PROCEDURE — 200N000002 HC R&B ICU UMMC

## 2021-07-17 PROCEDURE — 258N000003 HC RX IP 258 OP 636: Performed by: INTERNAL MEDICINE

## 2021-07-17 PROCEDURE — 250N000011 HC RX IP 250 OP 636: Performed by: SURGERY

## 2021-07-17 PROCEDURE — 250N000009 HC RX 250: Performed by: INTERNAL MEDICINE

## 2021-07-17 PROCEDURE — 82040 ASSAY OF SERUM ALBUMIN: CPT | Performed by: STUDENT IN AN ORGANIZED HEALTH CARE EDUCATION/TRAINING PROGRAM

## 2021-07-17 PROCEDURE — 36415 COLL VENOUS BLD VENIPUNCTURE: CPT | Performed by: THORACIC SURGERY (CARDIOTHORACIC VASCULAR SURGERY)

## 2021-07-17 PROCEDURE — 86923 COMPATIBILITY TEST ELECTRIC: CPT | Performed by: INTERNAL MEDICINE

## 2021-07-17 PROCEDURE — 250N000013 HC RX MED GY IP 250 OP 250 PS 637: Performed by: STUDENT IN AN ORGANIZED HEALTH CARE EDUCATION/TRAINING PROGRAM

## 2021-07-17 PROCEDURE — P9016 RBC LEUKOCYTES REDUCED: HCPCS | Performed by: INTERNAL MEDICINE

## 2021-07-17 RX ORDER — TAMSULOSIN HYDROCHLORIDE 0.4 MG/1
0.4 CAPSULE ORAL DAILY
Status: DISCONTINUED | OUTPATIENT
Start: 2021-07-17 | End: 2021-07-22 | Stop reason: HOSPADM

## 2021-07-17 RX ORDER — ARIPIPRAZOLE 5 MG/1
5 TABLET ORAL AT BEDTIME
Status: DISCONTINUED | OUTPATIENT
Start: 2021-07-18 | End: 2021-07-20

## 2021-07-17 RX ADMIN — HEPARIN SODIUM 5000 UNITS: 5000 INJECTION, SOLUTION INTRAVENOUS; SUBCUTANEOUS at 14:00

## 2021-07-17 RX ADMIN — ARIPIPRAZOLE 5 MG: 5 TABLET ORAL at 08:56

## 2021-07-17 RX ADMIN — TAMSULOSIN HYDROCHLORIDE 0.4 MG: 0.4 CAPSULE ORAL at 14:00

## 2021-07-17 RX ADMIN — INSULIN ASPART 1 UNITS: 100 INJECTION, SOLUTION INTRAVENOUS; SUBCUTANEOUS at 18:17

## 2021-07-17 RX ADMIN — OXYCODONE HYDROCHLORIDE 5 MG: 5 TABLET ORAL at 16:15

## 2021-07-17 RX ADMIN — DOCUSATE SODIUM 50 MG AND SENNOSIDES 8.6 MG 1 TABLET: 8.6; 5 TABLET, FILM COATED ORAL at 20:29

## 2021-07-17 RX ADMIN — OXYCODONE HYDROCHLORIDE 10 MG: 10 TABLET ORAL at 00:01

## 2021-07-17 RX ADMIN — HEPARIN SODIUM 5000 UNITS: 5000 INJECTION, SOLUTION INTRAVENOUS; SUBCUTANEOUS at 04:07

## 2021-07-17 RX ADMIN — DOCUSATE SODIUM 50 MG AND SENNOSIDES 8.6 MG 1 TABLET: 8.6; 5 TABLET, FILM COATED ORAL at 08:49

## 2021-07-17 RX ADMIN — CARVEDILOL 3.12 MG: 3.12 TABLET, FILM COATED ORAL at 18:18

## 2021-07-17 RX ADMIN — ROSUVASTATIN CALCIUM 5 MG: 5 TABLET, FILM COATED ORAL at 08:49

## 2021-07-17 RX ADMIN — MUPIROCIN 0.5 G: 20 OINTMENT TOPICAL at 20:32

## 2021-07-17 RX ADMIN — LAMIVUDINE 100 MG: 100 TABLET, FILM COATED ORAL at 08:49

## 2021-07-17 RX ADMIN — GABAPENTIN 100 MG: 100 CAPSULE ORAL at 14:00

## 2021-07-17 RX ADMIN — GABAPENTIN 100 MG: 100 CAPSULE ORAL at 08:49

## 2021-07-17 RX ADMIN — MYCOPHENOLATE MOFETIL 750 MG: 250 CAPSULE ORAL at 20:29

## 2021-07-17 RX ADMIN — METHOCARBAMOL 750 MG: 750 TABLET, FILM COATED ORAL at 16:15

## 2021-07-17 RX ADMIN — ASPIRIN 81 MG CHEWABLE TABLET 324 MG: 81 TABLET CHEWABLE at 08:49

## 2021-07-17 RX ADMIN — MYCOPHENOLATE MOFETIL 750 MG: 250 CAPSULE ORAL at 08:49

## 2021-07-17 RX ADMIN — SODIUM PHOSPHATE, MONOBASIC, MONOHYDRATE 15 MMOL: 276; 142 INJECTION, SOLUTION INTRAVENOUS at 09:05

## 2021-07-17 RX ADMIN — GABAPENTIN 100 MG: 100 CAPSULE ORAL at 20:29

## 2021-07-17 RX ADMIN — HEPARIN SODIUM 5000 UNITS: 5000 INJECTION, SOLUTION INTRAVENOUS; SUBCUTANEOUS at 20:29

## 2021-07-17 RX ADMIN — PANTOPRAZOLE SODIUM 40 MG: 40 TABLET, DELAYED RELEASE ORAL at 08:49

## 2021-07-17 RX ADMIN — OXYCODONE HYDROCHLORIDE 10 MG: 10 TABLET ORAL at 04:07

## 2021-07-17 RX ADMIN — PREDNISONE 5 MG: 5 TABLET ORAL at 08:49

## 2021-07-17 RX ADMIN — ACETAMINOPHEN 650 MG: 325 TABLET, FILM COATED ORAL at 14:00

## 2021-07-17 RX ADMIN — CARVEDILOL 3.12 MG: 3.12 TABLET, FILM COATED ORAL at 08:49

## 2021-07-17 ASSESSMENT — ACTIVITIES OF DAILY LIVING (ADL)
ADLS_ACUITY_SCORE: 16

## 2021-07-17 ASSESSMENT — MIFFLIN-ST. JEOR: SCORE: 1617.38

## 2021-07-17 NOTE — PLAN OF CARE
Major Shift Events: A+O x4, disoriented to place at times but easily reorients self. Obeys commands and very pleasant. SR. On RA. Regular Diet. Adequate UO. Minimal chest tube output.     Plan: Transfer to  once bed becomes available.    For vital signs and complete assessments, please see documentation flowsheets.

## 2021-07-17 NOTE — PROGRESS NOTES
CV ICU PROGRESS NOTE    ASSESSMENT: Frandy Workman is a 56 year old male with history of HTN, HLD, DM2, chronic anemia, stage 3 CKD, HCC and ESTRADA cirrhosis s/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 Zapata on 7/14/2021.    =============================================PLAN====================================================    Today's changes:  - resume PTA tamsulosin  - switched abilify to qhs (patient takes in evening)  - remains floor status  - liver transplant team aware of patient, do not need to be consulted unless rising LFTs/concerns. Continue PTA immunosuppression regimen.  - 1u pRBC for HGB 6.6. No significant CT output, HR and MAPs appropriate. Will recheck HGB in am unless clinical change.  - appreciate hematology recs    Neuro/ pain/ sedation:  Post-op pain   MDD, Bipolar I  - Pain: scheduled tylenol; PRN oxycodone, PRN IV hydromorphone, PRN Robaxin  - PTA aripiprazole     Pulmonary care:   Postoperative respiratory insufficiency  - Supplemental oxygen to keep saturation above 92 %.  - on RA  - Incentive spirometer every when awake.  - daily CXR    Cardiovascular:    CAD with NSTEMI (90% stenosis mid-LM) now s/p CABGx2 7/14/2021  HTN, HLD, T2DM  - echo at end of case w/ EF 50-55%  - goal MAP >65   - aspirin 324 mg qday  - PTA coreg 3.125 mg BID  - PTA rosuvastatin 5mg qday (max dose i/s/o immunosuppression)    GI care:   ESTRADA cirrhosis s/p liver transplant 11/2019  HCC s/p TACE  H/o gastric ulcers w/o CMV or H Pylori  - low Na diet  - docusate and senna BID   - Protonix ppx  - PTA MMF 750mg BID, prednisone 5mg qday    Renal/ Fluid, Electrolytes, and nutrition:    Stage 3 CKD, b/l creat ~1.6  - monitor intake and output.  - Electrolyte replacement protocol  - PTA flomax    Endocrine:    Stress  hyperglycemia  T2DM c/b retinopathy  - high dose ISS  - hold PTA jardiance, PTA eylea, Novolog with meals, Tresiba    ID/ Antibiotics:  H/o HIV  - perioperative antibiotics: Ancef  - PTA lamivudine    Heme:     Chronic anemia (followed outpatient by anemia clinic)  - HGB 6.6, will transfuse 1u pRBC.    Prophylaxis:    - SCD  - PPI  - SQH    Lines/ tubes/ drains:  - Carroll  - Chest tubes: x3 (2 x meds, 1 x L pleural)    Disposition:  - Floor status    Patient seen, findings and plan discussed with CV ICU staff, Dr. Cruz.    Dara Livingston MD  Surgery, PGY3  Resident   [id number]      =====================================      SUBJECTIVE:  NAEO. Still feeling run down, though slightly improved from yesterday.    OBJECTIVE:   1. VITAL SIGNS:   Temp:  [98.3  F (36.8  C)-98.7  F (37.1  C)] 98.6  F (37  C)  Pulse:  [] 87  Resp:  [16-18] 18  BP: ()/(56-78) 121/78  SpO2:  [97 %-100 %] 100 %  Resp: 18      2. INTAKE/ OUTPUT:   I/O last 3 completed shifts:  In: 698.5 [P.O.:480; I.V.:218.5]  Out: 2630 [Urine:2500; Chest Tube:130]    3. PHYSICAL EXAMINATION:   General: NAD  Neuro: awake, alert, moves all four extremities  Resp: NLB on RA  Chest/CV: RRR. CT x3 s/s  Abdomen: Soft, non-distended  Extremities: warm and well perfused.    4. INVESTIGATIONS:   Arterial Blood Gases   Recent Labs   Lab 07/14/21  1605 07/14/21  1351 07/14/21  1252 07/14/21  1216   PH 7.37 7.29* 7.36 7.38   PCO2 37 44 35 36   PO2 87 145* 181* 306*   HCO3 21 21 20* 21     Complete Blood Count   Recent Labs   Lab 07/17/21  0832 07/16/21  0727 07/15/21  1320 07/15/21  0425 07/14/21 2049   WBC 4.9 5.7  --  8.7 7.9   HGB 6.6* 7.0* 7.2* 7.3* 7.8*   PLT 87* 88*  --  132* 114*     Basic Metabolic Panel  Recent Labs   Lab 07/17/21  0630 07/16/21  2222 07/16/21  1600 07/16/21  1432 07/16/21  0727 07/16/21  0654 07/15/21  1320 07/15/21  0425 07/15/21  0425 07/14/21 2049     --   --   --  138  --   --   --  140 143   POTASSIUM 4.0  --   --   --   4.2 4.0 4.2  --  3.8 4.3   CHLORIDE 108  --   --   --  109  --   --   --  109 111*   CO2 23  --   --   --  23  --   --   --  24 23   BUN 30  --   --   --  32*  --   --   --  32* 32*   CR 1.24  --   --   --  1.49*  --   --   --  1.62* 1.48*   * 153* 305* 241* 171*  --   --    < > 139* 113*    < > = values in this interval not displayed.     Liver Function Tests  Recent Labs   Lab 07/17/21  0630 07/15/21  0425 07/14/21  2049 07/14/21  1352 07/14/21  1351 07/14/21  1215 07/12/21  1608   AST 12 22 30 29  --   --   --    ALT 10 15 17 15  --   --   --    ALKPHOS 78 60 62 64  --   --   --    BILITOTAL 0.5 0.5 0.6 1.2  --   --   --    ALBUMIN 2.7* 3.1* 3.3* 3.0*  --   --   --    INR  --   --   --   --  1.28* 1.45* 0.96     Pancreatic Enzymes  No lab results found in last 7 days.  Coagulation Profile  Recent Labs   Lab 07/14/21  1351 07/14/21  1215 07/12/21  1608   INR 1.28* 1.45* 0.96   PTT 46* 37  --          5. RADIOLOGY:   Recent Results (from the past 24 hour(s))   XR Chest Port 1 View    Narrative    EXAM: XR CHEST PORT 1 VIEW  7/17/2021 3:41 AM      HISTORY: s/p CABG 7/14, CT in place    COMPARISON:   X-ray: 7/16/2021     FINDINGS: Single view of the chest. Postoperative chest with intact  median sternotomy wires. Left basilar chest tube and mediastinal  drain. Small right pleural effusion. No left pleural effusion.  Unchanged cardiac silhouette. Bibasilar streakiness. Trace left  pneumothorax, decreased from prior.      Impression    IMPRESSION:  1. Trace left apical pneumothorax, decreased from prior, with chest  tube in place.  2. Small right pleural effusion with compressive atelectasis.  3. Bibasilar streakiness.    I have personally reviewed the examination and initial interpretation  and I agree with the findings.    JESSICA YI MD         SYSTEM ID:  [id number]       =========================================

## 2021-07-17 NOTE — PROGRESS NOTES
Major Shift Events:  Hgb 6.6, 1U RBCs transfused, recheck in AM. Minimal chest tube output. Up to chair with 1 assist. Ambulated in halls. Oxy/tylenol given for pain.  Plan: Transfer to floor when bed becomes available.    For vital signs and complete assessments, please see documentation flowsheets.

## 2021-07-18 ENCOUNTER — APPOINTMENT (OUTPATIENT)
Dept: GENERAL RADIOLOGY | Facility: CLINIC | Age: 56
DRG: 234 | End: 2021-07-18
Payer: MEDICARE

## 2021-07-18 ENCOUNTER — APPOINTMENT (OUTPATIENT)
Dept: OCCUPATIONAL THERAPY | Facility: CLINIC | Age: 56
DRG: 234 | End: 2021-07-18
Payer: MEDICARE

## 2021-07-18 LAB
ALBUMIN SERPL-MCNC: 2.6 G/DL (ref 3.4–5)
ALP SERPL-CCNC: 158 U/L (ref 40–150)
ALT SERPL W P-5'-P-CCNC: 16 U/L (ref 0–70)
ANION GAP SERPL CALCULATED.3IONS-SCNC: 7 MMOL/L (ref 3–14)
AST SERPL W P-5'-P-CCNC: 18 U/L (ref 0–45)
BILIRUB SERPL-MCNC: 0.5 MG/DL (ref 0.2–1.3)
BUN SERPL-MCNC: 27 MG/DL (ref 7–30)
CALCIUM SERPL-MCNC: 7.5 MG/DL (ref 8.5–10.1)
CHLORIDE BLD-SCNC: 107 MMOL/L (ref 94–109)
CO2 SERPL-SCNC: 24 MMOL/L (ref 20–32)
CREAT SERPL-MCNC: 1.11 MG/DL (ref 0.66–1.25)
ERYTHROCYTE [DISTWIDTH] IN BLOOD BY AUTOMATED COUNT: 17 % (ref 10–15)
GFR SERPL CREATININE-BSD FRML MDRD: 73 ML/MIN/1.73M2
GLUCOSE BLD-MCNC: 196 MG/DL (ref 70–99)
GLUCOSE BLDC GLUCOMTR-MCNC: 160 MG/DL (ref 70–99)
GLUCOSE BLDC GLUCOMTR-MCNC: 170 MG/DL (ref 70–99)
GLUCOSE BLDC GLUCOMTR-MCNC: 170 MG/DL (ref 70–99)
GLUCOSE BLDC GLUCOMTR-MCNC: 197 MG/DL (ref 70–99)
HCT VFR BLD AUTO: 24.9 % (ref 40–53)
HGB BLD-MCNC: 7.8 G/DL (ref 13.3–17.7)
MAGNESIUM SERPL-MCNC: 2.7 MG/DL (ref 1.6–2.3)
MCH RBC QN AUTO: 28.6 PG (ref 26.5–33)
MCHC RBC AUTO-ENTMCNC: 31.3 G/DL (ref 31.5–36.5)
MCV RBC AUTO: 91 FL (ref 78–100)
PHOSPHATE SERPL-MCNC: 1.7 MG/DL (ref 2.5–4.5)
PLATELET # BLD AUTO: 100 10E3/UL (ref 150–450)
POTASSIUM BLD-SCNC: 4.3 MMOL/L (ref 3.4–5.3)
PROT SERPL-MCNC: 6.4 G/DL (ref 6.8–8.8)
RBC # BLD AUTO: 2.73 10E6/UL (ref 4.4–5.9)
SODIUM SERPL-SCNC: 138 MMOL/L (ref 133–144)
WBC # BLD AUTO: 4.8 10E3/UL (ref 4–11)

## 2021-07-18 PROCEDURE — 71045 X-RAY EXAM CHEST 1 VIEW: CPT | Mod: 77

## 2021-07-18 PROCEDURE — 250N000011 HC RX IP 250 OP 636: Performed by: SURGERY

## 2021-07-18 PROCEDURE — 97530 THERAPEUTIC ACTIVITIES: CPT | Mod: GO

## 2021-07-18 PROCEDURE — 250N000012 HC RX MED GY IP 250 OP 636 PS 637: Performed by: STUDENT IN AN ORGANIZED HEALTH CARE EDUCATION/TRAINING PROGRAM

## 2021-07-18 PROCEDURE — 71045 X-RAY EXAM CHEST 1 VIEW: CPT | Mod: 26 | Performed by: RADIOLOGY

## 2021-07-18 PROCEDURE — 36415 COLL VENOUS BLD VENIPUNCTURE: CPT | Performed by: STUDENT IN AN ORGANIZED HEALTH CARE EDUCATION/TRAINING PROGRAM

## 2021-07-18 PROCEDURE — 97535 SELF CARE MNGMENT TRAINING: CPT | Mod: GO

## 2021-07-18 PROCEDURE — 85027 COMPLETE CBC AUTOMATED: CPT | Performed by: STUDENT IN AN ORGANIZED HEALTH CARE EDUCATION/TRAINING PROGRAM

## 2021-07-18 PROCEDURE — 84100 ASSAY OF PHOSPHORUS: CPT | Performed by: STUDENT IN AN ORGANIZED HEALTH CARE EDUCATION/TRAINING PROGRAM

## 2021-07-18 PROCEDURE — 80053 COMPREHEN METABOLIC PANEL: CPT | Performed by: STUDENT IN AN ORGANIZED HEALTH CARE EDUCATION/TRAINING PROGRAM

## 2021-07-18 PROCEDURE — 250N000013 HC RX MED GY IP 250 OP 250 PS 637: Performed by: SURGERY

## 2021-07-18 PROCEDURE — 250N000013 HC RX MED GY IP 250 OP 250 PS 637: Performed by: STUDENT IN AN ORGANIZED HEALTH CARE EDUCATION/TRAINING PROGRAM

## 2021-07-18 PROCEDURE — 71045 X-RAY EXAM CHEST 1 VIEW: CPT

## 2021-07-18 PROCEDURE — 250N000009 HC RX 250: Performed by: SURGERY

## 2021-07-18 PROCEDURE — 83735 ASSAY OF MAGNESIUM: CPT | Performed by: STUDENT IN AN ORGANIZED HEALTH CARE EDUCATION/TRAINING PROGRAM

## 2021-07-18 PROCEDURE — 258N000003 HC RX IP 258 OP 636: Performed by: SURGERY

## 2021-07-18 PROCEDURE — 200N000002 HC R&B ICU UMMC

## 2021-07-18 RX ORDER — CARVEDILOL 3.12 MG/1
3.12 TABLET ORAL ONCE
Status: COMPLETED | OUTPATIENT
Start: 2021-07-18 | End: 2021-07-18

## 2021-07-18 RX ORDER — CARVEDILOL 6.25 MG/1
6.25 TABLET ORAL 2 TIMES DAILY WITH MEALS
Status: DISCONTINUED | OUTPATIENT
Start: 2021-07-18 | End: 2021-07-22 | Stop reason: HOSPADM

## 2021-07-18 RX ADMIN — INSULIN ASPART 2 UNITS: 100 INJECTION, SOLUTION INTRAVENOUS; SUBCUTANEOUS at 12:01

## 2021-07-18 RX ADMIN — OXYCODONE HYDROCHLORIDE 5 MG: 5 TABLET ORAL at 17:49

## 2021-07-18 RX ADMIN — INSULIN ASPART 1 UNITS: 100 INJECTION, SOLUTION INTRAVENOUS; SUBCUTANEOUS at 09:39

## 2021-07-18 RX ADMIN — HEPARIN SODIUM 5000 UNITS: 5000 INJECTION, SOLUTION INTRAVENOUS; SUBCUTANEOUS at 04:51

## 2021-07-18 RX ADMIN — GABAPENTIN 100 MG: 100 CAPSULE ORAL at 19:41

## 2021-07-18 RX ADMIN — SODIUM PHOSPHATE, MONOBASIC, MONOHYDRATE 15 MMOL: 276; 142 INJECTION, SOLUTION INTRAVENOUS at 13:06

## 2021-07-18 RX ADMIN — MUPIROCIN 0.5 G: 20 OINTMENT TOPICAL at 08:15

## 2021-07-18 RX ADMIN — TAMSULOSIN HYDROCHLORIDE 0.4 MG: 0.4 CAPSULE ORAL at 08:01

## 2021-07-18 RX ADMIN — CARVEDILOL 6.25 MG: 6.25 TABLET, FILM COATED ORAL at 17:49

## 2021-07-18 RX ADMIN — CARVEDILOL 3.12 MG: 3.12 TABLET, FILM COATED ORAL at 08:01

## 2021-07-18 RX ADMIN — ARIPIPRAZOLE 5 MG: 5 TABLET ORAL at 21:07

## 2021-07-18 RX ADMIN — LAMIVUDINE 100 MG: 100 TABLET, FILM COATED ORAL at 08:06

## 2021-07-18 RX ADMIN — OXYCODONE HYDROCHLORIDE 5 MG: 5 TABLET ORAL at 00:06

## 2021-07-18 RX ADMIN — GABAPENTIN 100 MG: 100 CAPSULE ORAL at 13:59

## 2021-07-18 RX ADMIN — MYCOPHENOLATE MOFETIL 750 MG: 250 CAPSULE ORAL at 19:41

## 2021-07-18 RX ADMIN — HEPARIN SODIUM 5000 UNITS: 5000 INJECTION, SOLUTION INTRAVENOUS; SUBCUTANEOUS at 19:42

## 2021-07-18 RX ADMIN — HEPARIN SODIUM 5000 UNITS: 5000 INJECTION, SOLUTION INTRAVENOUS; SUBCUTANEOUS at 11:52

## 2021-07-18 RX ADMIN — ROSUVASTATIN CALCIUM 5 MG: 5 TABLET, FILM COATED ORAL at 08:05

## 2021-07-18 RX ADMIN — DOCUSATE SODIUM 50 MG AND SENNOSIDES 8.6 MG 1 TABLET: 8.6; 5 TABLET, FILM COATED ORAL at 19:42

## 2021-07-18 RX ADMIN — POLYETHYLENE GLYCOL 3350 17 G: 17 POWDER, FOR SOLUTION ORAL at 07:59

## 2021-07-18 RX ADMIN — PREDNISONE 5 MG: 5 TABLET ORAL at 08:01

## 2021-07-18 RX ADMIN — GABAPENTIN 100 MG: 100 CAPSULE ORAL at 08:00

## 2021-07-18 RX ADMIN — ASPIRIN 81 MG CHEWABLE TABLET 324 MG: 81 TABLET CHEWABLE at 08:00

## 2021-07-18 RX ADMIN — OXYCODONE HYDROCHLORIDE 5 MG: 5 TABLET ORAL at 23:18

## 2021-07-18 RX ADMIN — CARVEDILOL 3.12 MG: 3.12 TABLET, FILM COATED ORAL at 10:40

## 2021-07-18 RX ADMIN — INSULIN ASPART 1 UNITS: 100 INJECTION, SOLUTION INTRAVENOUS; SUBCUTANEOUS at 17:13

## 2021-07-18 RX ADMIN — MYCOPHENOLATE MOFETIL 750 MG: 250 CAPSULE ORAL at 08:00

## 2021-07-18 RX ADMIN — OXYCODONE HYDROCHLORIDE 5 MG: 5 TABLET ORAL at 04:50

## 2021-07-18 RX ADMIN — PANTOPRAZOLE SODIUM 40 MG: 40 TABLET, DELAYED RELEASE ORAL at 08:01

## 2021-07-18 RX ADMIN — MUPIROCIN 0.5 G: 20 OINTMENT TOPICAL at 21:08

## 2021-07-18 ASSESSMENT — ACTIVITIES OF DAILY LIVING (ADL)
ADLS_ACUITY_SCORE: 16

## 2021-07-18 NOTE — PROGRESS NOTES
CV ICU PROGRESS NOTE    ASSESSMENT: Frandy Workman is a 56 year old male with history of HTN, HLD, DM2, chronic anemia, stage 3 CKD, HCC and ESTRADA cirrhosis s/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 Zapata on 7/14/2021.    =============================================PLAN====================================================    Today's changes:  - d/c CT and wires  - will obtain post-pull CXR  - increase coreg dose to 6.25mg BID (from 3.125)  - remains floor status    Neuro/ pain/ sedation:  Post-op pain   MDD, Bipolar I  - Pain: scheduled tylenol; PRN oxycodone, PRN IV hydromorphone, PRN Robaxin  - PTA aripiprazole     Pulmonary care:   Postoperative respiratory insufficiency  - Supplemental oxygen to keep saturation above 92 %.  - on RA  - Incentive spirometer every when awake.  - post-pull CXR    Cardiovascular:    CAD with NSTEMI (90% stenosis mid-LM) now s/p CABGx2 7/14/2021  HTN, HLD, T2DM  - echo at end of case w/ EF 50-55%  - goal MAP >65   - aspirin 324 mg qday  - PTA coreg 6.25 mg BID  - PTA rosuvastatin 5mg qday (max dose i/s/o immunosuppression)    GI care:   ESTRADA cirrhosis s/p liver transplant 11/2019  HCC s/p TACE  H/o gastric ulcers w/o CMV or H Pylori  - low Na diet  - docusate and senna BID   - Protonix ppx  - PTA MMF 750mg BID, prednisone 5mg qday    Renal/ Fluid, Electrolytes, and nutrition:    Stage 3 CKD, b/l creat ~1.6  - monitor intake and output.  - Electrolyte replacement protocol  - PTA flomax  - -1.4L w/o diuresis yesterday, CXR similar to yesterday, will hold off on any diuresis today    Endocrine:    Stress hyperglycemia  T2DM c/b retinopathy  - high dose ISS  - hold PTA jardiance, PTA eylea, Novolog with meals, Tresiba    ID/ Antibiotics:  H/o HIV  - s/p perioperative antibiotics:  Ancef  - PTA lamivudine    Heme:     Chronic anemia (followed outpatient by anemia clinic)  - HGB 7.8 (from 6.6 yesterday am -> 1u pRBC)    Prophylaxis:    - SCD  - PPI  - SQH    Lines/ tubes/ drains:  - Carroll  - chest tubes d/c'd  - wires d/c'd    Disposition:  - Floor status    Patient seen, findings and plan discussed with CV ICU staff, Dr. Cruz.    Dara Livingston MD  Surgery, PGY3  Resident   [id number]      =====================================      SUBJECTIVE:  NAEO. Feels improved today. 5/10 pain.    OBJECTIVE:   1. VITAL SIGNS:   Temp:  [98.2  F (36.8  C)-98.6  F (37  C)] 98.6  F (37  C)  Pulse:  [] 86  Resp:  [16-18] 16  BP: (108-142)/(63-81) 127/71  SpO2:  [98 %-100 %] 99 %  Resp: 16      2. INTAKE/ OUTPUT:   I/O last 3 completed shifts:  In: 1005 [P.O.:690; I.V.:15]  Out: 2605 [Urine:2425; Chest Tube:180]    3. PHYSICAL EXAMINATION:   General: NAD  Neuro: awake, alert, moves all four extremities  Resp: NLB on RA  Chest/CV: RRR. CT x3 s/s this am  Abdomen: Soft, non-distended  Extremities: warm and well perfused.    4. INVESTIGATIONS:   Arterial Blood Gases   Recent Labs   Lab 07/14/21  1605 07/14/21  1351 07/14/21  1252 07/14/21  1216   PH 7.37 7.29* 7.36 7.38   PCO2 37 44 35 36   PO2 87 145* 181* 306*   HCO3 21 21 20* 21     Complete Blood Count   Recent Labs   Lab 07/18/21  1056 07/17/21  0832 07/16/21  0727 07/15/21  1320 07/15/21  0425   WBC 4.8 4.9 5.7  --  8.7   HGB 7.8* 6.6* 7.0* 7.2* 7.3*   * 87* 88*  --  132*     Basic Metabolic Panel  Recent Labs   Lab 07/18/21  1156 07/18/21  1056 07/18/21  0936 07/17/21  2109 07/17/21  0630 07/16/21  0727 07/16/21  0654 07/15/21  0558 07/15/21  0425 07/15/21  0425   NA  --  138  --   --  140 138  --   --   --  140   POTASSIUM  --  4.3  --   --  4.0 4.2 4.0   < >  --  3.8   CHLORIDE  --  107  --   --  108 109  --   --   --  109   CO2  --  24  --   --  23 23  --   --   --  24   BUN  --  27  --   --  30 32*  --   --   --  32*   CR  --  1.11  --    --  1.24 1.49*  --   --   --  1.62*   * 196* 170* 168* 137* 171*  --   --    < > 139*    < > = values in this interval not displayed.     Liver Function Tests  Recent Labs   Lab 07/18/21  1056 07/17/21  0630 07/15/21  0425 07/14/21  2049 07/14/21  1351 07/14/21  1215 07/12/21  1608   AST 18 12 22 30  --   --   --    ALT 16 10 15 17  --   --   --    ALKPHOS 158* 78 60 62  --   --   --    BILITOTAL 0.5 0.5 0.5 0.6  --   --   --    ALBUMIN 2.6* 2.7* 3.1* 3.3*  --   --   --    INR  --   --   --   --  1.28* 1.45* 0.96     Pancreatic Enzymes  No lab results found in last 7 days.  Coagulation Profile  Recent Labs   Lab 07/14/21  1351 07/14/21  1215 07/12/21  1608   INR 1.28* 1.45* 0.96   PTT 46* 37  --          5. RADIOLOGY:   Recent Results (from the past 24 hour(s))   XR Chest Port 1 View    Narrative    EXAM: XR CHEST PORT 1 VIEW  7/18/2021 3:47 AM      HISTORY: s/p CABG 7/14, CT in place    COMPARISON: X-ray 7/17/2021    FINDINGS: Single view of the chest. Postoperative chest with intact  median sternotomy wires, 2 mediastinal drains, and left basilar chest  tube, similar to prior. Clips overlying the upper abdomen. Near  complete resolution of left apical pneumothorax. Small right pleural  effusion. Enlarged cardiac silhouette. Bibasilar streakiness.  Visualized upper abdomen and bones are unremarkable.      Impression    IMPRESSION:  1. Near complete resolution of left apical pneumothorax with chest  tube in place.  2. Small right pleural effusion with compressive atelectasis.  3. Bibasilar opacities, likely atelectasis, similar to prior.    I have personally reviewed the examination and initial interpretation  and I agree with the findings.    JESSICA YI MD         SYSTEM ID:  [id number]       =========================================

## 2021-07-18 NOTE — PROGRESS NOTES
CVTS Progress Note     Briefly, Frandy Workman is a 56 year old male with history of HTN, HLD, DM2, chronic anemia, stage 3 CKD, HCC and ESTRADA cirrhosis s/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 Zapata on 7/14/2021.    Chest tubes with 160 ml out of serosang fluid in 24 hours. No airleak. Removed without complications.   TPW capped. Removed with no resistance.     Patient HD stable pre and post chest tube pull.     Follow up CXR ordered.     Sherrell Obrien DNP, CNP  [facility] Cardiothoracic Surgery  O: [phone]  Pgr [phone]

## 2021-07-18 NOTE — PROGRESS NOTES
Major Shift Events:  Pt chest tubes pulled today. Ambulated in [place] with OT.    Neuro: Intact, Pt is alert and oriented x4. Pt is slightly confused immediately after waking up but is easily reoriented. Up to chair with 1 assist. Pain managed with 5mg Oxy 1x.    CV: Pt has been sinus rhythm, BP stable.    Pulm: Pt on room air, mild dyspnea on exertion.    GI/: Voids spontaneously, good urine output . Pt tried to have a bowel movement but continues to be constipated.    Plan: Transfer to floor when a bed becomes available.    For vital signs and complete assessments, please see documentation flowsheets.      Janet Li. SN

## 2021-07-19 ENCOUNTER — APPOINTMENT (OUTPATIENT)
Dept: GENERAL RADIOLOGY | Facility: CLINIC | Age: 56
DRG: 234 | End: 2021-07-19
Payer: MEDICARE

## 2021-07-19 ENCOUNTER — APPOINTMENT (OUTPATIENT)
Dept: OCCUPATIONAL THERAPY | Facility: CLINIC | Age: 56
DRG: 234 | End: 2021-07-19
Payer: MEDICARE

## 2021-07-19 LAB
ALBUMIN SERPL-MCNC: 2.6 G/DL (ref 3.4–5)
ALP SERPL-CCNC: 149 U/L (ref 40–150)
ALT SERPL W P-5'-P-CCNC: 28 U/L (ref 0–70)
ANION GAP SERPL CALCULATED.3IONS-SCNC: 9 MMOL/L (ref 3–14)
AST SERPL W P-5'-P-CCNC: 16 U/L (ref 0–45)
BILIRUB SERPL-MCNC: 0.6 MG/DL (ref 0.2–1.3)
BUN SERPL-MCNC: 25 MG/DL (ref 7–30)
CALCIUM SERPL-MCNC: 7.3 MG/DL (ref 8.5–10.1)
CHLORIDE BLD-SCNC: 109 MMOL/L (ref 94–109)
CO2 SERPL-SCNC: 22 MMOL/L (ref 20–32)
CREAT SERPL-MCNC: 1.05 MG/DL (ref 0.66–1.25)
ERYTHROCYTE [DISTWIDTH] IN BLOOD BY AUTOMATED COUNT: 16.4 % (ref 10–15)
GFR SERPL CREATININE-BSD FRML MDRD: 78 ML/MIN/1.73M2
GLUCOSE BLD-MCNC: 117 MG/DL (ref 70–99)
GLUCOSE BLDC GLUCOMTR-MCNC: 118 MG/DL (ref 70–99)
GLUCOSE BLDC GLUCOMTR-MCNC: 119 MG/DL (ref 70–99)
GLUCOSE BLDC GLUCOMTR-MCNC: 153 MG/DL (ref 70–99)
GLUCOSE BLDC GLUCOMTR-MCNC: 183 MG/DL (ref 70–99)
GLUCOSE BLDC GLUCOMTR-MCNC: 204 MG/DL (ref 70–99)
GLUCOSE BLDC GLUCOMTR-MCNC: 210 MG/DL (ref 70–99)
HCT VFR BLD AUTO: 25 % (ref 40–53)
HGB BLD-MCNC: 7.8 G/DL (ref 13.3–17.7)
MAGNESIUM SERPL-MCNC: 2.6 MG/DL (ref 1.6–2.3)
MCH RBC QN AUTO: 28.9 PG (ref 26.5–33)
MCHC RBC AUTO-ENTMCNC: 31.2 G/DL (ref 31.5–36.5)
MCV RBC AUTO: 93 FL (ref 78–100)
PHOSPHATE SERPL-MCNC: 2.5 MG/DL (ref 2.5–4.5)
PHOSPHATE SERPL-MCNC: 3.1 MG/DL (ref 2.5–4.5)
PLATELET # BLD AUTO: 111 10E3/UL (ref 150–450)
POTASSIUM BLD-SCNC: 3.9 MMOL/L (ref 3.4–5.3)
PROT SERPL-MCNC: 6.1 G/DL (ref 6.8–8.8)
RBC # BLD AUTO: 2.7 10E6/UL (ref 4.4–5.9)
SODIUM SERPL-SCNC: 140 MMOL/L (ref 133–144)
WBC # BLD AUTO: 4.7 10E3/UL (ref 4–11)

## 2021-07-19 PROCEDURE — 84100 ASSAY OF PHOSPHORUS: CPT | Performed by: THORACIC SURGERY (CARDIOTHORACIC VASCULAR SURGERY)

## 2021-07-19 PROCEDURE — 80053 COMPREHEN METABOLIC PANEL: CPT | Performed by: STUDENT IN AN ORGANIZED HEALTH CARE EDUCATION/TRAINING PROGRAM

## 2021-07-19 PROCEDURE — 200N000002 HC R&B ICU UMMC

## 2021-07-19 PROCEDURE — 250N000013 HC RX MED GY IP 250 OP 250 PS 637: Performed by: STUDENT IN AN ORGANIZED HEALTH CARE EDUCATION/TRAINING PROGRAM

## 2021-07-19 PROCEDURE — 250N000009 HC RX 250: Performed by: THORACIC SURGERY (CARDIOTHORACIC VASCULAR SURGERY)

## 2021-07-19 PROCEDURE — 250N000012 HC RX MED GY IP 250 OP 636 PS 637: Performed by: STUDENT IN AN ORGANIZED HEALTH CARE EDUCATION/TRAINING PROGRAM

## 2021-07-19 PROCEDURE — 36415 COLL VENOUS BLD VENIPUNCTURE: CPT | Performed by: THORACIC SURGERY (CARDIOTHORACIC VASCULAR SURGERY)

## 2021-07-19 PROCEDURE — 250N000013 HC RX MED GY IP 250 OP 250 PS 637: Performed by: SURGERY

## 2021-07-19 PROCEDURE — 84100 ASSAY OF PHOSPHORUS: CPT | Performed by: STUDENT IN AN ORGANIZED HEALTH CARE EDUCATION/TRAINING PROGRAM

## 2021-07-19 PROCEDURE — 258N000003 HC RX IP 258 OP 636: Performed by: THORACIC SURGERY (CARDIOTHORACIC VASCULAR SURGERY)

## 2021-07-19 PROCEDURE — 83735 ASSAY OF MAGNESIUM: CPT | Performed by: STUDENT IN AN ORGANIZED HEALTH CARE EDUCATION/TRAINING PROGRAM

## 2021-07-19 PROCEDURE — 250N000011 HC RX IP 250 OP 636: Performed by: SURGERY

## 2021-07-19 PROCEDURE — 71045 X-RAY EXAM CHEST 1 VIEW: CPT | Mod: 26 | Performed by: STUDENT IN AN ORGANIZED HEALTH CARE EDUCATION/TRAINING PROGRAM

## 2021-07-19 PROCEDURE — 97535 SELF CARE MNGMENT TRAINING: CPT | Mod: GO | Performed by: OCCUPATIONAL THERAPIST

## 2021-07-19 PROCEDURE — 85027 COMPLETE CBC AUTOMATED: CPT | Performed by: STUDENT IN AN ORGANIZED HEALTH CARE EDUCATION/TRAINING PROGRAM

## 2021-07-19 PROCEDURE — 36415 COLL VENOUS BLD VENIPUNCTURE: CPT | Performed by: STUDENT IN AN ORGANIZED HEALTH CARE EDUCATION/TRAINING PROGRAM

## 2021-07-19 PROCEDURE — 71045 X-RAY EXAM CHEST 1 VIEW: CPT

## 2021-07-19 PROCEDURE — 99232 SBSQ HOSP IP/OBS MODERATE 35: CPT | Mod: 24 | Performed by: STUDENT IN AN ORGANIZED HEALTH CARE EDUCATION/TRAINING PROGRAM

## 2021-07-19 RX ORDER — POLYETHYLENE GLYCOL 3350 17 G/17G
17 POWDER, FOR SOLUTION ORAL 2 TIMES DAILY
Status: DISCONTINUED | OUTPATIENT
Start: 2021-07-19 | End: 2021-07-22 | Stop reason: HOSPADM

## 2021-07-19 RX ADMIN — ROSUVASTATIN CALCIUM 5 MG: 5 TABLET, FILM COATED ORAL at 08:10

## 2021-07-19 RX ADMIN — OXYCODONE HYDROCHLORIDE 5 MG: 5 TABLET ORAL at 19:40

## 2021-07-19 RX ADMIN — ACETAMINOPHEN 650 MG: 325 TABLET, FILM COATED ORAL at 12:05

## 2021-07-19 RX ADMIN — PREDNISONE 5 MG: 5 TABLET ORAL at 08:10

## 2021-07-19 RX ADMIN — CARVEDILOL 6.25 MG: 6.25 TABLET, FILM COATED ORAL at 18:23

## 2021-07-19 RX ADMIN — ARIPIPRAZOLE 5 MG: 5 TABLET ORAL at 21:45

## 2021-07-19 RX ADMIN — SODIUM PHOSPHATE, MONOBASIC, MONOHYDRATE 9 MMOL: 276; 142 INJECTION, SOLUTION INTRAVENOUS at 08:38

## 2021-07-19 RX ADMIN — LAMIVUDINE 100 MG: 100 TABLET, FILM COATED ORAL at 08:10

## 2021-07-19 RX ADMIN — PANTOPRAZOLE SODIUM 40 MG: 40 TABLET, DELAYED RELEASE ORAL at 08:10

## 2021-07-19 RX ADMIN — POLYETHYLENE GLYCOL 3350 17 G: 17 POWDER, FOR SOLUTION ORAL at 08:10

## 2021-07-19 RX ADMIN — DOCUSATE SODIUM 50 MG AND SENNOSIDES 8.6 MG 1 TABLET: 8.6; 5 TABLET, FILM COATED ORAL at 08:10

## 2021-07-19 RX ADMIN — GABAPENTIN 100 MG: 100 CAPSULE ORAL at 14:11

## 2021-07-19 RX ADMIN — MYCOPHENOLATE MOFETIL 750 MG: 250 CAPSULE ORAL at 19:40

## 2021-07-19 RX ADMIN — INSULIN ASPART 1 UNITS: 100 INJECTION, SOLUTION INTRAVENOUS; SUBCUTANEOUS at 12:11

## 2021-07-19 RX ADMIN — TAMSULOSIN HYDROCHLORIDE 0.4 MG: 0.4 CAPSULE ORAL at 08:10

## 2021-07-19 RX ADMIN — GABAPENTIN 100 MG: 100 CAPSULE ORAL at 19:40

## 2021-07-19 RX ADMIN — MUPIROCIN 0.5 G: 20 OINTMENT TOPICAL at 08:23

## 2021-07-19 RX ADMIN — GABAPENTIN 100 MG: 100 CAPSULE ORAL at 08:09

## 2021-07-19 RX ADMIN — MAGNESIUM HYDROXIDE 30 ML: 400 SUSPENSION ORAL at 15:03

## 2021-07-19 RX ADMIN — Medication 5 MG: at 19:40

## 2021-07-19 RX ADMIN — ASPIRIN 81 MG CHEWABLE TABLET 324 MG: 81 TABLET CHEWABLE at 08:09

## 2021-07-19 RX ADMIN — OXYCODONE HYDROCHLORIDE 5 MG: 5 TABLET ORAL at 05:55

## 2021-07-19 RX ADMIN — MYCOPHENOLATE MOFETIL 750 MG: 250 CAPSULE ORAL at 08:10

## 2021-07-19 RX ADMIN — INSULIN ASPART 1 UNITS: 100 INJECTION, SOLUTION INTRAVENOUS; SUBCUTANEOUS at 15:47

## 2021-07-19 RX ADMIN — HEPARIN SODIUM 5000 UNITS: 5000 INJECTION, SOLUTION INTRAVENOUS; SUBCUTANEOUS at 19:40

## 2021-07-19 RX ADMIN — HEPARIN SODIUM 5000 UNITS: 5000 INJECTION, SOLUTION INTRAVENOUS; SUBCUTANEOUS at 12:11

## 2021-07-19 RX ADMIN — CARVEDILOL 6.25 MG: 6.25 TABLET, FILM COATED ORAL at 08:09

## 2021-07-19 RX ADMIN — HEPARIN SODIUM 5000 UNITS: 5000 INJECTION, SOLUTION INTRAVENOUS; SUBCUTANEOUS at 03:10

## 2021-07-19 ASSESSMENT — ACTIVITIES OF DAILY LIVING (ADL)
ADLS_ACUITY_SCORE: 17
ADLS_ACUITY_SCORE: 17
ADLS_ACUITY_SCORE: 16
ADLS_ACUITY_SCORE: 16
ADLS_ACUITY_SCORE: 17
ADLS_ACUITY_SCORE: 16

## 2021-07-19 ASSESSMENT — MIFFLIN-ST. JEOR: SCORE: 1585.38

## 2021-07-19 NOTE — PROGRESS NOTES
CV ICU PROGRESS NOTE    ASSESSMENT: Frandy Workman is a 56 year old male with history of HTN, HLD, DM2, chronic anemia, stage 3 CKD, HCC and ESTRADA cirrhosis s/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 Zapata on 7/14/2021.    =============================================PLAN====================================================    Today's changes:  - start melatonin 5mg  - follow delirium protocol given intermittent disorientation and inability to sleep overnight  - increase miralax to BID, give suppository today  - remains floor status    Neuro/ pain/ sedation:  Post-op pain   MDD, Bipolar I  - Pain: scheduled tylenol; PRN oxycodone, PRN IV hydromorphone, PRN Robaxin  - melatonin 5mg at bedtime   - PTA aripiprazole     Pulmonary care:   Postoperative respiratory insufficiency  - Supplemental oxygen to keep saturation above 92 %.  - on RA  - Incentive spirometer every when awake.    Cardiovascular:    CAD with NSTEMI (90% stenosis mid-LM) now s/p CABGx2 7/14/2021  HTN, HLD, T2DM  - echo at end of case w/ EF 50-55%  - goal MAP >65   - aspirin 324 mg qday  - PTA coreg 6.25 mg BID  - PTA rosuvastatin 5mg qday (max dose i/s/o immunosuppression)    GI care:   ESTRADA cirrhosis s/p liver transplant 11/2019  HCC s/p TACE  H/o gastric ulcers w/o CMV or H Pylori  - low Na diet  - miralax and senna BID, give suppository today  - Protonix ppx  - PTA MMF 750mg BID, prednisone 5mg qday    Renal/ Fluid, Electrolytes, and nutrition:    Stage 3 CKD, b/l creat ~1.6  - monitor intake and output.  - Electrolyte replacement protocol  - PTA flomax  - -2.2L w/o diuresis yesterday, CXR similar to yesterday, will hold off on any diuresis today    Endocrine:    Stress hyperglycemia  T2DM c/b retinopathy  - high dose ISS  - hold PTA jardiance,  PTA eylea, Novolog with meals, Tresiba    ID/ Antibiotics:  H/o HIV  - s/p perioperative antibiotics: Ancef  - PTA lamivudine    Heme:     Chronic anemia (followed outpatient by anemia clinic)  - HGB 7.8     Prophylaxis:    - SCD  - PPI  - SQH    Lines/ tubes/ drains:  - Carroll d/c'd  - chest tubes d/c'd  - wires d/c'd    Disposition:  - Floor status    Elvira Soto, MS4    Agree with noted and edited accordingly. Patient seen, findings and plan discussed with CV ICU staff, Dr. Kirkland.    Dara Livingston MD  Surgery, PGY3  Resident   [id number]      =====================================      SUBJECTIVE:  NAEO. Feels improved today. Pain is tolerable. Passing gas, no BM since admission.    OBJECTIVE:   1. VITAL SIGNS:   Temp:  [98.4  F (36.9  C)-98.6  F (37  C)] 98.4  F (36.9  C)  Pulse:  [] 86  Resp:  [16-18] 16  BP: (110-143)/(69-82) 143/77  SpO2:  [99 %-100 %] 99 %  Resp: 16      2. INTAKE/ OUTPUT:   I/O last 3 completed shifts:  In: 540 [P.O.:540]  Out: 2530 [Urine:2400; Chest Tube:130]    3. PHYSICAL EXAMINATION:   General: NAD  Neuro: awake, alert, moves all four extremities  Resp: NLB on RA  Chest/CV: RRR.   Abdomen: Soft, non-distended  Extremities: warm and well perfused.    4. INVESTIGATIONS:   Arterial Blood Gases   Recent Labs   Lab 07/14/21  1605 07/14/21  1351 07/14/21  1252 07/14/21  1216   PH 7.37 7.29* 7.36 7.38   PCO2 37 44 35 36   PO2 87 145* 181* 306*   HCO3 21 21 20* 21     Complete Blood Count   Recent Labs   Lab 07/19/21  0516 07/18/21  1056 07/17/21  0832 07/16/21  0727   WBC 4.7 4.8 4.9 5.7   HGB 7.8* 7.8* 6.6* 7.0*   * 100* 87* 88*     Basic Metabolic Panel  Recent Labs   Lab 07/19/21  0516 07/19/21  0315 07/18/21  1957 07/18/21  1712 07/18/21  1056 07/17/21  0630 07/16/21  0727     --   --   --  138 140 138   POTASSIUM 3.9  --   --   --  4.3 4.0 4.2   CHLORIDE 109  --   --   --  107 108 109   CO2 22  --   --   --  24 23 23   BUN 25  --   --   --  27 30 32*   CR 1.05  --   --    --  1.11 1.24 1.49*   * 119* 160* 170* 196* 137* 171*     Liver Function Tests  Recent Labs   Lab 07/19/21  0516 07/18/21  1056 07/17/21  0630 07/15/21  0425 07/14/21  1351 07/14/21  1215 07/12/21  1608   AST 16 18 12 22  --   --   --    ALT 28 16 10 15  --   --   --    ALKPHOS 149 158* 78 60  --   --   --    BILITOTAL 0.6 0.5 0.5 0.5  --   --   --    ALBUMIN 2.6* 2.6* 2.7* 3.1*  --   --   --    INR  --   --   --   --  1.28* 1.45* 0.96     Pancreatic Enzymes  No lab results found in last 7 days.  Coagulation Profile  Recent Labs   Lab 07/14/21  1351 07/14/21  1215 07/12/21  1608   INR 1.28* 1.45* 0.96   PTT 46* 37  --          5. RADIOLOGY:   Recent Results (from the past 24 hour(s))   XR Chest Port 1 View    Narrative    EXAMINATION: XR CHEST PORT 1 VIEW 7/18/2021 7:52 PM.    COMPARISON: 7/18/2021     HISTORY: Chest tube removal    FINDINGS: Portable AP view of the chest. Postsurgical changes of CABG.  Interval removal of the mediastinal drains and left chest tube.  Unchanged trace left apical pneumothorax. No right-sided pneumothorax.  The cardiomediastinal silhouette is stable. Trachea is midline. Right  basilar atelectasis. Unchanged trace right pleural effusion. No acute  airspace opacity. The visualized upper abdomen is within normal  limits.      Impression    IMPRESSION:  1. Interval removal of the mediastinal drains and left chest tube.  Stable trace left apical pneumothorax.  2. Trace right pleural effusion with associated atelectasis.    I have personally reviewed the examination and initial interpretation  and I agree with the findings.    ORALIA DURAN MD         SYSTEM ID:  [id number]   XR Chest Port 1 View    Narrative    EXAM: XR CHEST PORT 1 VW 7/19/2021 6:27 AM    HISTORY: Status post CABG 7/14/2021    COMPARISON: 7/18/2021    FINDINGS: Portable AP view of the chest. Surgical changes of CABG with  intact median sternotomy wires and mediastinal surgical clips. Midline  trachea.  Cardiomediastinal silhouette is enlarged similar to prior.  Pulmonary vasculature is distinct. Small right pleural effusion. No  focal airspace opacity. Trace left apical pneumothorax. The visualized  upper abdomen and osseous structures are within normal limits.      Impression    IMPRESSION:  1. Unchanged trace left apical pneumothorax.  2. Stable small right pleural effusion with associated atelectasis.    I have personally reviewed the examination and initial interpretation  and I agree with the findings.    NEEMA NOVA MD         SYSTEM ID:  [id number]       =========================================

## 2021-07-19 NOTE — PROGRESS NOTES
Clinical Nutrition Services- Brief Note    Reviewed nutrition risk factors due to LOS. Pt is tolerating a cardiac diet, eating well per nursing documentation. No nutrition issues identified at this time. RD will continue to follow per nutrition protocol.  Katrina Cr RD, LD  [id number]  Pgr: [phone]

## 2021-07-19 NOTE — PROGRESS NOTES
Pertinent PMH: Miller is being followed by CVTS who was admitted on 07/13 for NSTEMI/CABG X2. PMH of CKD3, CAD, HCC, Liver Tx (2019), MDD, and BPD1.  Major Shift Events: Patient AAOX3-4, disoriented to situation however  easily reoriented, able to follow commands, mood is pleasant, all other neuro assessments intact. VSS, 's - 140's,  - 110, SR/ST c/ PACs/PVCs, afebrile, on RA.Denies pain.Voids spontaneously. Moves SBA. BM x3 after milk of magnesia administered  Plan:Pending transfer to the floor orders Continue to monitor.     For vital signs, hemodynamics and complete assessments, please see documentation flowsheets.

## 2021-07-19 NOTE — PLAN OF CARE
Pertinent PMH: Miller is being followed by CVTS who was admitted on 07/13 for NSTEMI/CABG X2. PMH of CKD3, CAD, HCC, Liver Tx (2019), MDD, and BPD1.  Major Shift Events: Disoriented to time, place, and situation at times, easily reoriented, able to follow commands, mood is pleasant, all other neuro assessments intact. VSS, 's - 140's,  - 110, SR/ST c/ PACs/PVCs, afebrile, on RA. Oxy X 2 administered for shoulder pain. Voids spontaneously. Moves SBA.   Plan: Transfer orders placed. Continue to monitor.    For vital signs, hemodynamics and complete assessments, please see documentation flowsheets.

## 2021-07-19 NOTE — PROGRESS NOTES
Assumed care at 1245. Pt alert and oriented. Resting in bed. No complains made. Denies pain at this time. Will continue to monitor. Care and observation continues.

## 2021-07-20 ENCOUNTER — APPOINTMENT (OUTPATIENT)
Dept: OCCUPATIONAL THERAPY | Facility: CLINIC | Age: 56
DRG: 234 | End: 2021-07-20
Payer: MEDICARE

## 2021-07-20 ENCOUNTER — APPOINTMENT (OUTPATIENT)
Dept: GENERAL RADIOLOGY | Facility: CLINIC | Age: 56
DRG: 234 | End: 2021-07-20
Payer: MEDICARE

## 2021-07-20 LAB
ALBUMIN SERPL-MCNC: 2.6 G/DL (ref 3.4–5)
ALP SERPL-CCNC: 147 U/L (ref 40–150)
ALT SERPL W P-5'-P-CCNC: 19 U/L (ref 0–70)
ANION GAP SERPL CALCULATED.3IONS-SCNC: 7 MMOL/L (ref 3–14)
AST SERPL W P-5'-P-CCNC: 12 U/L (ref 0–45)
BILIRUB SERPL-MCNC: 0.4 MG/DL (ref 0.2–1.3)
BUN SERPL-MCNC: 22 MG/DL (ref 7–30)
CALCIUM SERPL-MCNC: 7.6 MG/DL (ref 8.5–10.1)
CHLORIDE BLD-SCNC: 107 MMOL/L (ref 94–109)
CO2 SERPL-SCNC: 26 MMOL/L (ref 20–32)
CREAT SERPL-MCNC: 1.08 MG/DL (ref 0.66–1.25)
ERYTHROCYTE [DISTWIDTH] IN BLOOD BY AUTOMATED COUNT: 16.7 % (ref 10–15)
GFR SERPL CREATININE-BSD FRML MDRD: 76 ML/MIN/1.73M2
GLUCOSE BLD-MCNC: 135 MG/DL (ref 70–99)
GLUCOSE BLDC GLUCOMTR-MCNC: 140 MG/DL (ref 70–99)
GLUCOSE BLDC GLUCOMTR-MCNC: 173 MG/DL (ref 70–99)
GLUCOSE BLDC GLUCOMTR-MCNC: 202 MG/DL (ref 70–99)
GLUCOSE BLDC GLUCOMTR-MCNC: 210 MG/DL (ref 70–99)
HCT VFR BLD AUTO: 23 % (ref 40–53)
HGB BLD-MCNC: 7.3 G/DL (ref 13.3–17.7)
MAGNESIUM SERPL-MCNC: 2.8 MG/DL (ref 1.6–2.3)
MCH RBC QN AUTO: 29.2 PG (ref 26.5–33)
MCHC RBC AUTO-ENTMCNC: 31.7 G/DL (ref 31.5–36.5)
MCV RBC AUTO: 92 FL (ref 78–100)
PHOSPHATE SERPL-MCNC: 1.8 MG/DL (ref 2.5–4.5)
PHOSPHATE SERPL-MCNC: 2.5 MG/DL (ref 2.5–4.5)
PLATELET # BLD AUTO: 110 10E3/UL (ref 150–450)
POTASSIUM BLD-SCNC: 4.2 MMOL/L (ref 3.4–5.3)
PROT SERPL-MCNC: 6.2 G/DL (ref 6.8–8.8)
RBC # BLD AUTO: 2.5 10E6/UL (ref 4.4–5.9)
SODIUM SERPL-SCNC: 140 MMOL/L (ref 133–144)
WBC # BLD AUTO: 3.9 10E3/UL (ref 4–11)

## 2021-07-20 PROCEDURE — 250N000012 HC RX MED GY IP 250 OP 636 PS 637: Performed by: STUDENT IN AN ORGANIZED HEALTH CARE EDUCATION/TRAINING PROGRAM

## 2021-07-20 PROCEDURE — 85027 COMPLETE CBC AUTOMATED: CPT | Performed by: STUDENT IN AN ORGANIZED HEALTH CARE EDUCATION/TRAINING PROGRAM

## 2021-07-20 PROCEDURE — 250N000013 HC RX MED GY IP 250 OP 250 PS 637: Performed by: PHYSICIAN ASSISTANT

## 2021-07-20 PROCEDURE — 97535 SELF CARE MNGMENT TRAINING: CPT | Mod: GO

## 2021-07-20 PROCEDURE — 250N000013 HC RX MED GY IP 250 OP 250 PS 637: Performed by: THORACIC SURGERY (CARDIOTHORACIC VASCULAR SURGERY)

## 2021-07-20 PROCEDURE — 83735 ASSAY OF MAGNESIUM: CPT | Performed by: STUDENT IN AN ORGANIZED HEALTH CARE EDUCATION/TRAINING PROGRAM

## 2021-07-20 PROCEDURE — 97530 THERAPEUTIC ACTIVITIES: CPT | Mod: GO

## 2021-07-20 PROCEDURE — 214N000001 HC R&B CCU UMMC

## 2021-07-20 PROCEDURE — 80053 COMPREHEN METABOLIC PANEL: CPT | Performed by: STUDENT IN AN ORGANIZED HEALTH CARE EDUCATION/TRAINING PROGRAM

## 2021-07-20 PROCEDURE — 71045 X-RAY EXAM CHEST 1 VIEW: CPT

## 2021-07-20 PROCEDURE — 97110 THERAPEUTIC EXERCISES: CPT | Mod: GO

## 2021-07-20 PROCEDURE — 250N000013 HC RX MED GY IP 250 OP 250 PS 637: Performed by: STUDENT IN AN ORGANIZED HEALTH CARE EDUCATION/TRAINING PROGRAM

## 2021-07-20 PROCEDURE — 84100 ASSAY OF PHOSPHORUS: CPT | Performed by: STUDENT IN AN ORGANIZED HEALTH CARE EDUCATION/TRAINING PROGRAM

## 2021-07-20 PROCEDURE — 250N000011 HC RX IP 250 OP 636: Performed by: SURGERY

## 2021-07-20 PROCEDURE — 84100 ASSAY OF PHOSPHORUS: CPT | Performed by: THORACIC SURGERY (CARDIOTHORACIC VASCULAR SURGERY)

## 2021-07-20 PROCEDURE — 71045 X-RAY EXAM CHEST 1 VIEW: CPT | Mod: 26 | Performed by: RADIOLOGY

## 2021-07-20 PROCEDURE — 99232 SBSQ HOSP IP/OBS MODERATE 35: CPT | Mod: 24 | Performed by: STUDENT IN AN ORGANIZED HEALTH CARE EDUCATION/TRAINING PROGRAM

## 2021-07-20 PROCEDURE — 36415 COLL VENOUS BLD VENIPUNCTURE: CPT | Performed by: THORACIC SURGERY (CARDIOTHORACIC VASCULAR SURGERY)

## 2021-07-20 PROCEDURE — 250N000013 HC RX MED GY IP 250 OP 250 PS 637: Performed by: SURGERY

## 2021-07-20 PROCEDURE — 36415 COLL VENOUS BLD VENIPUNCTURE: CPT | Performed by: STUDENT IN AN ORGANIZED HEALTH CARE EDUCATION/TRAINING PROGRAM

## 2021-07-20 RX ORDER — ARIPIPRAZOLE 5 MG/1
5 TABLET ORAL AT BEDTIME
Status: DISCONTINUED | OUTPATIENT
Start: 2021-07-20 | End: 2021-07-22 | Stop reason: HOSPADM

## 2021-07-20 RX ORDER — ASPIRIN 81 MG/1
324 TABLET, CHEWABLE ORAL DAILY
Status: DISCONTINUED | OUTPATIENT
Start: 2021-07-21 | End: 2021-07-21

## 2021-07-20 RX ADMIN — POTASSIUM & SODIUM PHOSPHATES POWDER PACK 280-160-250 MG 1 PACKET: 280-160-250 PACK at 20:41

## 2021-07-20 RX ADMIN — HYDRALAZINE HYDROCHLORIDE 10 MG: 20 INJECTION INTRAMUSCULAR; INTRAVENOUS at 07:55

## 2021-07-20 RX ADMIN — CARVEDILOL 6.25 MG: 6.25 TABLET, FILM COATED ORAL at 18:05

## 2021-07-20 RX ADMIN — OXYCODONE HYDROCHLORIDE 5 MG: 5 TABLET ORAL at 20:41

## 2021-07-20 RX ADMIN — HEPARIN SODIUM 5000 UNITS: 5000 INJECTION, SOLUTION INTRAVENOUS; SUBCUTANEOUS at 20:41

## 2021-07-20 RX ADMIN — ASPIRIN 81 MG CHEWABLE TABLET 324 MG: 81 TABLET CHEWABLE at 07:56

## 2021-07-20 RX ADMIN — POTASSIUM & SODIUM PHOSPHATES POWDER PACK 280-160-250 MG 1 PACKET: 280-160-250 PACK at 07:57

## 2021-07-20 RX ADMIN — INSULIN ASPART 2 UNITS: 100 INJECTION, SOLUTION INTRAVENOUS; SUBCUTANEOUS at 18:05

## 2021-07-20 RX ADMIN — OXYCODONE HYDROCHLORIDE 5 MG: 5 TABLET ORAL at 00:51

## 2021-07-20 RX ADMIN — PANTOPRAZOLE SODIUM 40 MG: 40 TABLET, DELAYED RELEASE ORAL at 07:56

## 2021-07-20 RX ADMIN — HEPARIN SODIUM 5000 UNITS: 5000 INJECTION, SOLUTION INTRAVENOUS; SUBCUTANEOUS at 03:24

## 2021-07-20 RX ADMIN — Medication 5 MG: at 20:41

## 2021-07-20 RX ADMIN — PREDNISONE 5 MG: 5 TABLET ORAL at 07:56

## 2021-07-20 RX ADMIN — INSULIN ASPART 1 UNITS: 100 INJECTION, SOLUTION INTRAVENOUS; SUBCUTANEOUS at 07:56

## 2021-07-20 RX ADMIN — ROSUVASTATIN CALCIUM 5 MG: 5 TABLET, FILM COATED ORAL at 08:06

## 2021-07-20 RX ADMIN — GABAPENTIN 100 MG: 100 CAPSULE ORAL at 07:57

## 2021-07-20 RX ADMIN — CARVEDILOL 6.25 MG: 6.25 TABLET, FILM COATED ORAL at 07:57

## 2021-07-20 RX ADMIN — OXYCODONE HYDROCHLORIDE 5 MG: 5 TABLET ORAL at 07:55

## 2021-07-20 RX ADMIN — ARIPIPRAZOLE 5 MG: 5 TABLET ORAL at 22:30

## 2021-07-20 RX ADMIN — HEPARIN SODIUM 5000 UNITS: 5000 INJECTION, SOLUTION INTRAVENOUS; SUBCUTANEOUS at 12:04

## 2021-07-20 RX ADMIN — OXYCODONE HYDROCHLORIDE 5 MG: 5 TABLET ORAL at 16:13

## 2021-07-20 RX ADMIN — MYCOPHENOLATE MOFETIL 750 MG: 250 CAPSULE ORAL at 07:56

## 2021-07-20 RX ADMIN — GABAPENTIN 100 MG: 100 CAPSULE ORAL at 13:30

## 2021-07-20 RX ADMIN — GABAPENTIN 100 MG: 100 CAPSULE ORAL at 20:44

## 2021-07-20 RX ADMIN — INSULIN ASPART 2 UNITS: 100 INJECTION, SOLUTION INTRAVENOUS; SUBCUTANEOUS at 12:04

## 2021-07-20 RX ADMIN — LAMIVUDINE 100 MG: 100 TABLET, FILM COATED ORAL at 07:57

## 2021-07-20 RX ADMIN — MYCOPHENOLATE MOFETIL 750 MG: 250 CAPSULE ORAL at 20:40

## 2021-07-20 RX ADMIN — TAMSULOSIN HYDROCHLORIDE 0.4 MG: 0.4 CAPSULE ORAL at 07:57

## 2021-07-20 ASSESSMENT — ACTIVITIES OF DAILY LIVING (ADL)
ADLS_ACUITY_SCORE: 16

## 2021-07-20 ASSESSMENT — MIFFLIN-ST. JEOR: SCORE: 1565.38

## 2021-07-20 NOTE — PROGRESS NOTES
Pt transferred from to  at 1140. Alert & oriented x 4  Afebrile, VSS. O2 sats 99% on room air.   Low Sodium diet- good po. No complaints of nausea.  Oxycodone x 1 providing good pain control.  Up in chair most of day. Ambulated in [place] x 3 today.

## 2021-07-20 NOTE — PROGRESS NOTES
CV ICU PROGRESS NOTE    ASSESSMENT: Frandy Workman is a 56 year old male with history of HTN, HLD, DM2, chronic anemia, stage 3 CKD, HCC and ESTRADA cirrhosis s/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 Zapata on 7/14/2021.     =============================================PLAN====================================================    Today's changes:  - remains floor status    Neuro/ pain/ sedation:  Post-op pain   MDD, Bipolar I  - Pain: scheduled tylenol; PRN oxycodone, PRN IV hydromorphone, PRN Robaxin  - melatonin 5mg at bedtime   - PTA aripiprazole     Pulmonary care:   Postoperative respiratory insufficiency  - Supplemental oxygen to keep saturation above 92 %.  - on RA  - Incentive spirometer every hour when awake.    Cardiovascular:    CAD with NSTEMI (90% stenosis mid-LM) now s/p CABGx2 7/14/2021  HTN, HLD, T2DM  - echo at end of case w/ EF 50-55%  - goal MAP >65   - aspirin 324 mg qday  - PTA coreg 6.25 mg BID  - PTA rosuvastatin 5mg qday (max dose i/s/o immunosuppression)    GI care:   ESTRADA cirrhosis s/p liver transplant 11/2019  HCC s/p TACE  H/o gastric ulcers w/o CMV or H Pylori  - low Na diet  - miralax and senna BID  - Protonix ppx  - PTA MMF 750mg BID, prednisone 5mg qday    Renal/ Fluid, Electrolytes, and nutrition:    Stage 3 CKD, b/l creat ~1.6  - monitor intake and output.  - Electrolyte replacement protocol  - PTA flomax  - net even yesterday, no diuresis today    Endocrine:    Stress hyperglycemia  T2DM c/b retinopathy  - high dose ISS  - hold PTA jardiance, PTA eylea, Novolog with meals, Tresiba    ID/ Antibiotics:  H/o HIV  - s/p perioperative antibiotics: Ancef  - PTA lamivudine    Heme:     Chronic anemia (followed outpatient by anemia clinic)  - HGB 7.8     Prophylaxis:    - SCD  - PPI  -  [facility]    Lines/ tubes/ drains:  - Carroll d/c'd  - chest tubes d/c'd  - wires d/c'd    Disposition:  - Floor status    Elvira Soto, MS4    Agree with noted and edited accordingly. Patient seen, findings and plan discussed with CV ICU staff, Dr. Kirkland.    Tylor Sanches MD  CVICU Resident, PGY3      =====================================      SUBJECTIVE:  NAEO. Pain is tolerable, slightly worse with movement, improved with PRNs. Tolerating PO intake. Had 3 BMs yesterday. Ambulating well with PT.     OBJECTIVE:   1. VITAL SIGNS:   Temp:  [98.2  F (36.8  C)-98.5  F (36.9  C)] 98.4  F (36.9  C)  Pulse:  [72-91] 79  Resp:  [12-20] 16  BP: (115-136)/(68-76) 136/76  SpO2:  [96 %-100 %] 100 %  Resp: 16      2. INTAKE/ OUTPUT:   I/O last 3 completed shifts:  In: 1510 [P.O.:1260; I.V.:250]  Out: 500 [Urine:500]    3. PHYSICAL EXAMINATION:   General: NAD  Neuro: awake, alert, moves all four extremities  Resp: NLB on RA  Chest/CV: RRR.   Abdomen: Soft, non-distended  Extremities: warm and well perfused.    4. INVESTIGATIONS:   Arterial Blood Gases   Recent Labs   Lab 07/14/21  1605 07/14/21  1351 07/14/21  1252 07/14/21  1216   PH 7.37 7.29* 7.36 7.38   PCO2 37 44 35 36   PO2 87 145* 181* 306*   HCO3 21 21 20* 21     Complete Blood Count   Recent Labs   Lab 07/20/21  0609 07/19/21  0516 07/18/21  1056 07/17/21  0832   WBC 3.9* 4.7 4.8 4.9   HGB 7.3* 7.8* 7.8* 6.6*   * 111* 100* 87*     Basic Metabolic Panel  Recent Labs   Lab 07/20/21  0609 07/19/21  2149 07/19/21  1934 07/19/21  1544 07/19/21  0516 07/18/21  1056 07/17/21  0630     --   --   --  140 138 140   POTASSIUM 4.2  --   --   --  3.9 4.3 4.0   CHLORIDE 107  --   --   --  109 107 108   CO2 26  --   --   --  22 24 23   BUN 22  --   --   --  25 27 30   CR 1.08  --   --   --  1.05 1.11 1.24   * 210* 204* 153* 117* 196* 137*     Liver Function Tests  Recent Labs   Lab 07/20/21  0609 07/19/21  0516 07/18/21  1056 07/17/21  0630 07/14/21  1351  07/14/21  1215   AST 12 16 18 12  --   --    ALT 19 28 16 10  --   --    ALKPHOS 147 149 158* 78  --   --    BILITOTAL 0.4 0.6 0.5 0.5  --   --    ALBUMIN 2.6* 2.6* 2.6* 2.7*  --   --    INR  --   --   --   --  1.28* 1.45*     Pancreatic Enzymes  No lab results found in last 7 days.  Coagulation Profile  Recent Labs   Lab 07/14/21  1351 07/14/21  1215   INR 1.28* 1.45*   PTT 46* 37         5. RADIOLOGY:   No results found for this or any previous visit (from the past 24 hour(s)).    =========================================

## 2021-07-20 NOTE — PROGRESS NOTES
CLINICAL NUTRITION SERVICES    Reason for Assessment:  Post-CABG nutrition education, received consult.    Diet History:  Pt reports no history of receiving heart-healthy nutrition education in the past. Pt typically eats 2 meals/day (see recall below). Majority of meals are pre-made or pt dines out.     Diet recall:  Breakfast - 2 eggo waffles with butter and sugar-free syrup   Lunch - skips   Dinner - steak (at a restaurant) -or- frozen enchiladas   Snacks - fruit   Beverages - sugar-free sports drinks, diet soda or water    Nutrition Prescription/Recs:  Continue heart-healthy diet.      Interventions:  Nutrition Education - Provided verbal instruction on a heart-healthy diet.  Provided handouts: Post-CABG Nutrition Therapy    Follow-up:   Encouraged patient to ask any further nutrition-related questions before discharge. In addition, pt may request outpatient RD appointment.    Katrina Cr RD, LD  6C RD Pager: [phone]

## 2021-07-20 NOTE — PLAN OF CARE
Major Shift Events:  No acute changes overnight. Pt A&O x4 upon assessment, but got up out of bed alone x3 and found to be slightly confused but easily redirectable. Pt was educated x3 on call light usage, bed alarm, and safety. Moderate chest pain reported overnight treated well with PRN 5 mg Oxycodone. VSS and WNL. Pt remains on room air with stable sats high 90's-100%. No BM overnight, voiding spontaneously. Chest tube sites WDL.   Plan: Transfer to floor  For vital signs and complete assessments, please see documentation flowsheets.

## 2021-07-21 ENCOUNTER — APPOINTMENT (OUTPATIENT)
Dept: OCCUPATIONAL THERAPY | Facility: CLINIC | Age: 56
DRG: 234 | End: 2021-07-21
Payer: MEDICARE

## 2021-07-21 ENCOUNTER — APPOINTMENT (OUTPATIENT)
Dept: GENERAL RADIOLOGY | Facility: CLINIC | Age: 56
DRG: 234 | End: 2021-07-21
Attending: PHYSICIAN ASSISTANT
Payer: MEDICARE

## 2021-07-21 LAB
ABO/RH(D): NORMAL
ALBUMIN SERPL-MCNC: 2.7 G/DL (ref 3.4–5)
ALP SERPL-CCNC: 126 U/L (ref 40–150)
ALT SERPL W P-5'-P-CCNC: 17 U/L (ref 0–70)
ANION GAP SERPL CALCULATED.3IONS-SCNC: 9 MMOL/L (ref 3–14)
ANTIBODY SCREEN: NEGATIVE
AST SERPL W P-5'-P-CCNC: 12 U/L (ref 0–45)
BILIRUB SERPL-MCNC: 0.5 MG/DL (ref 0.2–1.3)
BLD PROD TYP BPU: NORMAL
BLOOD COMPONENT TYPE: NORMAL
BUN SERPL-MCNC: 18 MG/DL (ref 7–30)
CALCIUM SERPL-MCNC: 7.1 MG/DL (ref 8.5–10.1)
CHLORIDE BLD-SCNC: 104 MMOL/L (ref 94–109)
CO2 SERPL-SCNC: 23 MMOL/L (ref 20–32)
CODING SYSTEM: NORMAL
CREAT SERPL-MCNC: 1.03 MG/DL (ref 0.66–1.25)
CROSSMATCH: NORMAL
ERYTHROCYTE [DISTWIDTH] IN BLOOD BY AUTOMATED COUNT: 17 % (ref 10–15)
GFR SERPL CREATININE-BSD FRML MDRD: 80 ML/MIN/1.73M2
GLUCOSE BLD-MCNC: 170 MG/DL (ref 70–99)
GLUCOSE BLDC GLUCOMTR-MCNC: 163 MG/DL (ref 70–99)
GLUCOSE BLDC GLUCOMTR-MCNC: 191 MG/DL (ref 70–99)
GLUCOSE BLDC GLUCOMTR-MCNC: 208 MG/DL (ref 70–99)
HCT VFR BLD AUTO: 22.6 % (ref 40–53)
HGB BLD-MCNC: 7 G/DL (ref 13.3–17.7)
HGB BLD-MCNC: 7.1 G/DL (ref 13.3–17.7)
ISSUE DATE AND TIME: NORMAL
MAGNESIUM SERPL-MCNC: 2.7 MG/DL (ref 1.6–2.3)
MCH RBC QN AUTO: 28.5 PG (ref 26.5–33)
MCHC RBC AUTO-ENTMCNC: 31 G/DL (ref 31.5–36.5)
MCV RBC AUTO: 92 FL (ref 78–100)
PHOSPHATE SERPL-MCNC: 3.1 MG/DL (ref 2.5–4.5)
PLATELET # BLD AUTO: 120 10E3/UL (ref 150–450)
POTASSIUM BLD-SCNC: 4.1 MMOL/L (ref 3.4–5.3)
PROT SERPL-MCNC: 6.2 G/DL (ref 6.8–8.8)
RBC # BLD AUTO: 2.46 10E6/UL (ref 4.4–5.9)
SODIUM SERPL-SCNC: 136 MMOL/L (ref 133–144)
SPECIMEN EXPIRATION DATE: NORMAL
UNIT ABO/RH: NORMAL
UNIT NUMBER: NORMAL
UNIT STATUS: NORMAL
UNIT TYPE ISBT: 5100
WBC # BLD AUTO: 4 10E3/UL (ref 4–11)

## 2021-07-21 PROCEDURE — 97535 SELF CARE MNGMENT TRAINING: CPT | Mod: GO

## 2021-07-21 PROCEDURE — 36415 COLL VENOUS BLD VENIPUNCTURE: CPT | Performed by: STUDENT IN AN ORGANIZED HEALTH CARE EDUCATION/TRAINING PROGRAM

## 2021-07-21 PROCEDURE — 71046 X-RAY EXAM CHEST 2 VIEWS: CPT

## 2021-07-21 PROCEDURE — 86900 BLOOD TYPING SEROLOGIC ABO: CPT | Performed by: PHYSICIAN ASSISTANT

## 2021-07-21 PROCEDURE — 250N000013 HC RX MED GY IP 250 OP 250 PS 637: Performed by: PHYSICIAN ASSISTANT

## 2021-07-21 PROCEDURE — 86923 COMPATIBILITY TEST ELECTRIC: CPT | Performed by: PHYSICIAN ASSISTANT

## 2021-07-21 PROCEDURE — 250N000011 HC RX IP 250 OP 636: Performed by: SURGERY

## 2021-07-21 PROCEDURE — 214N000001 HC R&B CCU UMMC

## 2021-07-21 PROCEDURE — 250N000013 HC RX MED GY IP 250 OP 250 PS 637: Performed by: STUDENT IN AN ORGANIZED HEALTH CARE EDUCATION/TRAINING PROGRAM

## 2021-07-21 PROCEDURE — 999N000128 HC STATISTIC PERIPHERAL IV START W/O US GUIDANCE

## 2021-07-21 PROCEDURE — P9016 RBC LEUKOCYTES REDUCED: HCPCS | Performed by: PHYSICIAN ASSISTANT

## 2021-07-21 PROCEDURE — 250N000012 HC RX MED GY IP 250 OP 636 PS 637: Performed by: STUDENT IN AN ORGANIZED HEALTH CARE EDUCATION/TRAINING PROGRAM

## 2021-07-21 PROCEDURE — 36415 COLL VENOUS BLD VENIPUNCTURE: CPT | Performed by: PHYSICIAN ASSISTANT

## 2021-07-21 PROCEDURE — 83735 ASSAY OF MAGNESIUM: CPT | Performed by: STUDENT IN AN ORGANIZED HEALTH CARE EDUCATION/TRAINING PROGRAM

## 2021-07-21 PROCEDURE — 85018 HEMOGLOBIN: CPT | Performed by: PHYSICIAN ASSISTANT

## 2021-07-21 PROCEDURE — 250N000009 HC RX 250: Performed by: THORACIC SURGERY (CARDIOTHORACIC VASCULAR SURGERY)

## 2021-07-21 PROCEDURE — 84100 ASSAY OF PHOSPHORUS: CPT | Performed by: STUDENT IN AN ORGANIZED HEALTH CARE EDUCATION/TRAINING PROGRAM

## 2021-07-21 PROCEDURE — 71046 X-RAY EXAM CHEST 2 VIEWS: CPT | Mod: 26 | Performed by: RADIOLOGY

## 2021-07-21 PROCEDURE — 80053 COMPREHEN METABOLIC PANEL: CPT | Performed by: STUDENT IN AN ORGANIZED HEALTH CARE EDUCATION/TRAINING PROGRAM

## 2021-07-21 PROCEDURE — 250N000013 HC RX MED GY IP 250 OP 250 PS 637: Performed by: SURGERY

## 2021-07-21 PROCEDURE — 85027 COMPLETE CBC AUTOMATED: CPT | Performed by: STUDENT IN AN ORGANIZED HEALTH CARE EDUCATION/TRAINING PROGRAM

## 2021-07-21 RX ORDER — POTASSIUM CHLORIDE 750 MG/1
20 TABLET, EXTENDED RELEASE ORAL ONCE
Status: COMPLETED | OUTPATIENT
Start: 2021-07-21 | End: 2021-07-21

## 2021-07-21 RX ORDER — ASPIRIN 81 MG/1
162 TABLET ORAL DAILY
Status: DISCONTINUED | OUTPATIENT
Start: 2021-07-22 | End: 2021-07-22 | Stop reason: HOSPADM

## 2021-07-21 RX ORDER — FUROSEMIDE 20 MG
20 TABLET ORAL DAILY
Status: DISCONTINUED | OUTPATIENT
Start: 2021-07-21 | End: 2021-07-22 | Stop reason: HOSPADM

## 2021-07-21 RX ORDER — LISINOPRIL 2.5 MG/1
2.5 TABLET ORAL DAILY
Status: DISCONTINUED | OUTPATIENT
Start: 2021-07-21 | End: 2021-07-22 | Stop reason: HOSPADM

## 2021-07-21 RX ADMIN — HEPARIN SODIUM 5000 UNITS: 5000 INJECTION, SOLUTION INTRAVENOUS; SUBCUTANEOUS at 18:04

## 2021-07-21 RX ADMIN — MYCOPHENOLATE MOFETIL 750 MG: 250 CAPSULE ORAL at 07:54

## 2021-07-21 RX ADMIN — MYCOPHENOLATE MOFETIL 750 MG: 250 CAPSULE ORAL at 18:04

## 2021-07-21 RX ADMIN — PANTOPRAZOLE SODIUM 40 MG: 40 TABLET, DELAYED RELEASE ORAL at 07:54

## 2021-07-21 RX ADMIN — Medication 15 MMOL: at 00:31

## 2021-07-21 RX ADMIN — ASPIRIN 324 MG: 81 TABLET, CHEWABLE ORAL at 07:51

## 2021-07-21 RX ADMIN — LISINOPRIL 2.5 MG: 2.5 TABLET ORAL at 09:25

## 2021-07-21 RX ADMIN — POTASSIUM CHLORIDE 20 MEQ: 750 TABLET, EXTENDED RELEASE ORAL at 09:25

## 2021-07-21 RX ADMIN — GABAPENTIN 100 MG: 100 CAPSULE ORAL at 13:18

## 2021-07-21 RX ADMIN — OXYCODONE HYDROCHLORIDE 5 MG: 5 TABLET ORAL at 13:21

## 2021-07-21 RX ADMIN — HEPARIN SODIUM 5000 UNITS: 5000 INJECTION, SOLUTION INTRAVENOUS; SUBCUTANEOUS at 03:20

## 2021-07-21 RX ADMIN — ROSUVASTATIN CALCIUM 5 MG: 5 TABLET, FILM COATED ORAL at 07:54

## 2021-07-21 RX ADMIN — Medication 5 MG: at 22:18

## 2021-07-21 RX ADMIN — INSULIN ASPART 1 UNITS: 100 INJECTION, SOLUTION INTRAVENOUS; SUBCUTANEOUS at 07:53

## 2021-07-21 RX ADMIN — OXYCODONE HYDROCHLORIDE 5 MG: 5 TABLET ORAL at 07:46

## 2021-07-21 RX ADMIN — ARIPIPRAZOLE 5 MG: 5 TABLET ORAL at 22:19

## 2021-07-21 RX ADMIN — LAMIVUDINE 100 MG: 100 TABLET, FILM COATED ORAL at 07:54

## 2021-07-21 RX ADMIN — PREDNISONE 5 MG: 5 TABLET ORAL at 07:53

## 2021-07-21 RX ADMIN — INSULIN ASPART 1 UNITS: 100 INJECTION, SOLUTION INTRAVENOUS; SUBCUTANEOUS at 15:23

## 2021-07-21 RX ADMIN — GABAPENTIN 100 MG: 100 CAPSULE ORAL at 18:04

## 2021-07-21 RX ADMIN — INSULIN ASPART 2 UNITS: 100 INJECTION, SOLUTION INTRAVENOUS; SUBCUTANEOUS at 10:57

## 2021-07-21 RX ADMIN — GABAPENTIN 100 MG: 100 CAPSULE ORAL at 07:52

## 2021-07-21 RX ADMIN — CARVEDILOL 6.25 MG: 6.25 TABLET, FILM COATED ORAL at 15:20

## 2021-07-21 RX ADMIN — FUROSEMIDE 20 MG: 20 TABLET ORAL at 09:25

## 2021-07-21 RX ADMIN — HEPARIN SODIUM 5000 UNITS: 5000 INJECTION, SOLUTION INTRAVENOUS; SUBCUTANEOUS at 10:58

## 2021-07-21 RX ADMIN — CARVEDILOL 6.25 MG: 6.25 TABLET, FILM COATED ORAL at 07:52

## 2021-07-21 ASSESSMENT — MIFFLIN-ST. JEOR: SCORE: 1554.29

## 2021-07-21 ASSESSMENT — ACTIVITIES OF DAILY LIVING (ADL)
ADLS_ACUITY_SCORE: 16

## 2021-07-21 NOTE — DISCHARGE SUMMARY
[facility], [place]   Cardiothoracic Surgery Hospital Discharge Summary     Frandy Workman MRN# [id number]   Age: 56 year old YOB: 1965     Admitting Physician:  Fermin Polanco MD  Discharge Physician:  LILLIAN Warren  Primary Care Physician:        Nerissa Moe     DATE OF ADMISSION: 7/12/2021      DATE OF DISCHARGE: July 22, 2021     Admit Wt: 81.6 kg   Discharge Wt: 73.5 kg          Primary Diagnoses:   1. CAD with unstable angina and NSTEMI  2. S/p 2 vessel CAB   3. Acute on chronic anemia          Secondary Diagnoses:   1. Bipolar I  2. Depression   3. T2DM with retinopathy   4. HTN  5. CKD Stage III with chronic anemia   6. Anxiety   7. HLD  8. HIV +  9. Hx Liver cirrhosis secondary to ESTRADA  10. Hepatocellular carcinoma  11. Hx Liver transplant   12. Hx Gastric ulcers  13. BPH  14. Hx cholelithiasis     PROCEDURES PERFORMED:   Date: 7/14/21.  Surgeon: Dr. Tom Zapata   1) Coronary artery bypass grafting x 2.              - Left internal mammary artery to left anterior descending artery              - Reversed saphenous vein graft to obtuse marginal artery 2  2) Endoscopic vein harvest left lower extremity  3) Transesophageal echocardiogram    OPERATIVE FINDINGS:  1) Ejection fraction: 55%  2) Left internal mammary artery 3mm and pulsatile flow.  3) Left greater saphenous vein 4-5mm and suitable for bypass.  4) Left anterior descending artery 2mm and free of disease at anastomosis.  5) Obtuse marginal artery 2mm and free of disease at anastomosis.    INTRAOPERATIVE COMPLICATIONS:  none    PATHOLOGY RESULTS:  none     CULTURE RESULTS:  none    CONSULTS:    1. PT/OT  2. Hematology / Oncology   3. Cardiology   4.     BRIEF HISTORY OF ILLNESS:  Frandy Workman is a 56 year old male with history of HTN, HLD, DM2, chronic anemia, stage 3 CKD, HCC and ESTRADA cirrhosis s/p liver transplant 11/2019, h/o HIV infection, recurrent  "gastric ulcers, MDD and bipolar I. Miller was admitted 7/13/21 with 3-4 days of unstable angina and found to have had NSTEMI, IABP placed and started on Hep gtt. Found to have multivessel CAD with 90% stenosis of the mid-LM at trifurcation. He was worked up for urgent CAB and deemed appropriate candidate.      HOSPITAL COURSE: Frandy Workman is a 56 year old male who on 7/14/2021 underwent the above-named procedures.  He tolerated the operation well and postoperatively was admitted to the CVICU.  He was extubated in the OR with neuro status intact.  His ICU stay was relatively uncomplicated and treatment included the usual hemodynamic support and weaning of pressors. He did have some insomnia that contributed to mild delirium. He was diuresed and chest tubes were removed without complication. 1 unit PRBC given 7/17.     He was transferred to the post-surgical telemetry unit on POD # 6 but had been floor status for a few days. He was feeling fatigued and his Hgb avg 7-8 during his stay, so he was given 1 unit PRBC on 7/21. Because he lives alone, he stayed an extra day for therapy and practice with ADLs before discharge. He was discharged on corrective insulin only, holding PTA Jardiance and Tresiba until serum glucose levels start to rise.     Prior to discharge, his pain was controlled well, he was able to perform most ADLs and ambulate without difficulty, and had full return of bowel and bladder function.  On July 22, 2021, he was discharged to home in stable condition.    Patient discharged on aspirin:  Yes 162 mg  Patient discharged on beta blocker: yes    Patient discharged on ACE Inhibitor/ARB: yes             Discharge Disposition:     Discharged to home            Condition on Discharge:     Discharge condition: Stable   Discharge vitals: Blood pressure 115/85, pulse 91, temperature 98.4  F (36.9  C), temperature source Oral, resp. rate 16, height 1.753 m (5' 9\"), weight 73.5 kg (162 lb), SpO2 98 %.     Code " "status on discharge: Full Code     Vitals:    07/20/21 0000 07/21/21 0320 07/22/21 0000   Weight: 75 kg (165 lb 5.5 oz) 73.9 kg (162 lb 14.4 oz) 73.5 kg (162 lb)       DAY OF DISCHARGE PHYSICAL EXAM:    Blood pressure 115/85, pulse 91, temperature 98.4  F (36.9  C), temperature source Oral, resp. rate 16, height 1.753 m (5' 9\"), weight 73.5 kg (162 lb), SpO2 98 %.  Vitals:    07/20/21 0000 07/21/21 0320 07/22/21 0000   Weight: 75 kg (165 lb 5.5 oz) 73.9 kg (162 lb 14.4 oz) 73.5 kg (162 lb)      Weight; - 8 kg since admit and trending down.   24 hr Fluid status; net even.   MAPs: 83 - 95    Gen: A&Ox4, NAD  Neuro: no focal deficits   CV: RRR, normal S1 S2, no murmurs, rubs or gallops.   Pulm: CTA, no wheezing or rhonchi, normal breathing on RA  Abd: nondistended, normal BS, soft, nontender  Ext: no peripheral edema  Incision: clean, dry, intact, no erythema, sternum stable  Tubes/drain sites: dressing clean and dry    BMP  Recent Labs   Lab 07/22/21  0733 07/21/21  2218 07/21/21  1522 07/21/21  1057 07/21/21  0617 07/20/21  0609 07/19/21  0516 07/18/21  1056   NA  --   --   --   --  136 140 140 138   POTASSIUM  --   --   --   --  4.1 4.2 3.9 4.3   CHLORIDE  --   --   --   --  104 107 109 107   DEBORAH  --   --   --   --  7.1* 7.6* 7.3* 7.5*   CO2  --   --   --   --  23 26 22 24   BUN  --   --   --   --  18 22 25 27   CR  --   --   --   --  1.03 1.08 1.05 1.11   * 208* 163* 191* 170* 135* 117* 196*     CBC  Recent Labs   Lab 07/22/21  0524 07/21/21  1019 07/21/21  0617 07/20/21  0609 07/19/21  0516   WBC 4.3  --  4.0 3.9* 4.7   RBC 2.64*  --  2.46* 2.50* 2.70*   HGB 7.7* 7.1* 7.0* 7.3* 7.8*   HCT 24.6*  --  22.6* 23.0* 25.0*   MCV 93  --  92 92 93   MCH 29.2  --  28.5 29.2 28.9   MCHC 31.3*  --  31.0* 31.7 31.2*   RDW 16.5*  --  17.0* 16.7* 16.4*   *  --  120* 110* 111*     INR  No lab results found in last 7 days.   Hepatic Panel  Recent Labs   Lab 07/21/21  0617 07/20/21  0609 07/19/21  0516 07/18/21  1056 "   AST 12 12 16 18   ALT 17 19 28 16   ALKPHOS 126 147 149 158*   BILITOTAL 0.5 0.4 0.6 0.5   ALBUMIN 2.7* 2.6* 2.6* 2.6*     Recent Labs   Lab 07/22/21  0733 07/21/21  2218 07/21/21  1522 07/21/21  1057 07/21/21  0617 07/20/21  2233   * 208* 163* 191* 170* 173*       ECHOCARDIOGRAM, 7/13/2021-   Ischemic cardiomyopathy with CAD in the LAD territory.  Left ventricular function is decreased. The ejection fraction is 45-50%  (mildly reduced). Mid anteroseptal, distal septal and apical akinesis is present.  Global right ventricular function is normal.  No significant valvular dysfunction present.  The inferior vena cava was normal in size with preserved respiratory variability.  No pericardial effusion is present.    CXR 7/21/2021-   1.  Stable postsurgical changes of CABG.  2.  Increased right costophrenic angle blunting, likely indicative of new small pleural effusion. Recommend  follow-up to clearing to exclude superimposed infection.    DISCHARGE INSTRUCTIONS:  You had a sternotomy, avoid lifting anything greater than ten pounds for 6 weeks after surgery and then less than 20 pounds for an additional 6 weeks. Do not reach backwards or use arms to push out of chair. Do not let people pull on your arms to assist with standing. Avoid twisting or reaching too far across your body.  Avoid strenuous activities such as bowling, vacuuming, raking, shoveling, golf or tennis for 12 weeks after your surgery. It is okay to resume sex if you feel comfortable in doing so. You may have to try different positions with your partner.  Splint your chest incision by hugging a pillow or bringing your arms across your chest when coughing or sneezing. Please try to sleep on your back for the first 4-6 weeks to avoid extra stress on your sternum (breastbone) while it is healing.     No driving for 4 weeks after surgery or while on pain medication.     Shower or wash your incisions daily with soap and water (or as instructed), pat  dry. Keep wound clean and dry, showers are okay after discharge, but don't let spray hit directly on incision. No baths or swimming for 1 month. Cover chest tube sites with gauze until they stop draining, then leave open to air. It is not abnormal for chest tube sites to drain yellowish/clear fluid for up to 2-3 weeks after surgery.   Watch for signs of infection: increased redness, tenderness, warmth or any drainage from sternum incision.  Also a temperature > 100.5 F or chills. Call your surgeon or primary care provider's office immediately. Remove any skin glue left on incisions after 10-14 days. This will not affect your incision and can speed up healing.    Exercise is very important in your recovery. Please follow the guidelines set up for you in your cardiac rehab classes at the hospital. If outpatient cardiac rehab was ordered for you, we highly recommend you participate. If you have problems arranging your cardiac rehab, please call [phone] for all locations, with the exception of [place], please call [phone] and [place], please call [phone].    Avoid sitting for prolonged periods of time, try to walk every hour during the day. If you have a leg incision, elevate your leg often when you are not walking.    Check your weight when you get home from the hospital and continue to check it daily through your recovery for at least a month. If you notice a weight gain of 2-3 pounds in a week, notify your primary care physician, cardiologist or surgeon.    Bowel activity may be slow after surgery. If necessary, you may take an over the counter laxative such as Milk of Magnesia or Miralax. You may have stool softeners prescribed (docusate sodium, Senokot). We recommend using stool softeners while using narcotics for pain (oxycodone/percocet, hydrocodone/vicodin, hydromorphone/dilaudid).      Wean OFF of narcotics (oxycodone, dilaudid, hydrocodone) as soon as possible. You should continue taking  acetaminophen as long as you have any surgical pain as the first choice for pain control and add narcotics as necessary for pain to be tolerable.      DO NOT SMOKE.  IF YOU NEED HELP QUITTING, PLEASE TALK WITH YOUR CARDIOLOGIST OR PRIMARY DOCTOR.    REGARDING PRESCRIPTION REFILLS.  If you need a refill on your pain medication contact us to discuss your pain and a possible one time refill.   All other medications will be adjusted, discontinued and re-filled by your primary care physician and/or your cardiologist as they were prior to your surgery. We have given you enough for one to three month with possibly one refill.    POST-OPERATIVE CLINIC VISITS  You have a follow up visit with CV[facility] Surgery Advance Care Practitioners on 7/27/21 at 3 pm at the [facility].  You will then return to the care of your primary provider and your cardiologist. Future medication refills should come from your PCP or Cardiologist.   You have a lab draw on 7/26/21 for chronic anemia.   You should see your primary care provider in 2-4 weeks after discharge.   It is important to see your cardiologist 7/27/21, as scheduled.      PRE-ADMISSION MEDICATIONS:  No current facility-administered medications on file prior to encounter.  Acetaminophen (TYLENOL) 325 MG CAPS, Take 325-650 mg by mouth every 4 hours as needed (pain, fever)  ARIPiprazole (ABILIFY) 5 MG tablet, daily  Artificial Tear Solution (SM ARTIFICIAL TEARS) SOLN, Place 1 drop into the right eye every hour as needed (dry eyes) Apply at least 4 times daily and as needed for dry eye  BD VIKTORIA U/F 32G X 4 MM insulin pen needle, Use 5 per day  ciclopirox (LOPROX) 0.77 % cream, Apply topically 2 times daily To feet and toenails.  Continuous Blood Gluc  (FREESTYLE CLAUDIA 14 DAY READER) CECILIO, Use to read blood sugars as per 's instructions.  Continuous Blood Gluc Sensor (FREESTYLE CLAUDIA 14 DAY SENSOR) MIS, Change every 14 days.  insulin aspart  (NOVOLOG PEN) 100 UNIT/ML pen, Inject 1-15 Units Subcutaneous 3 times daily (with meals)  lamiVUDine (EPIVIR) 100 MG tablet, Take 1 tablet (100 mg) by mouth daily  Multiple Vitamin (THERAVITE PO), Take 1 tablet by mouth every morning   mycophenolate (GENERIC EQUIVALENT) 250 MG capsule, Take 3 capsules (750 mg) by mouth every 12 hours  order for DME, 1 Device by Device route daily Knee high compression socks 15-20 mmhg.  pantoprazole (PROTONIX) 40 MG EC tablet, TAKE ONE TABLET BY MOUTH EVERY MORNING BEFORE BREAKFAST  polyethylene glycol (MIRALAX) 17 g packet, Take 1 packet by mouth daily  predniSONE (DELTASONE) 5 MG tablet, Take 1 tablet (5 mg) by mouth daily  rosuvastatin (CRESTOR) 5 MG tablet, Take 1 tablet (5 mg) by mouth daily  tamsulosin (FLOMAX) 0.4 MG capsule, Take 1 capsule (0.4 mg) by mouth daily  vitamin B-12 (CYANOCOBALAMIN) 1000 MCG tablet, Take 1 tablet (1,000 mcg) by mouth daily  vitamin D3 (CHOLECALCIFEROL) 50 mcg (2000 units) tablet, Take 1 tablet (50 mcg) by mouth daily         DISCHARGE MEDICATIONS:    Miller Workman   Home Medication Instructions NIDHI:[id number]    Printed on:07/22/21 0924   Medication Information                      Acetaminophen (TYLENOL) 325 MG CAPS  Take 325-650 mg by mouth every 4 hours as needed (pain, fever)             ARIPiprazole (ABILIFY) 5 MG tablet  daily             Artificial Tear Solution (SM ARTIFICIAL TEARS) SOLN  Place 1 drop into the right eye every hour as needed (dry eyes) Apply at least 4 times daily and as needed for dry eye             aspirin (ASA) 81 MG EC tablet  Take 2 tablets (162 mg) by mouth daily             BD VIKTORIA U/F 32G X 4 MM insulin pen needle  Use 5 per day             carvedilol (COREG) 6.25 MG tablet  Take 1 tablet (6.25 mg) by mouth 2 times daily (with meals)             ciclopirox (LOPROX) 0.77 % cream  Apply topically 2 times daily To feet and toenails.             Continuous Blood Gluc  (FREESTYLE CLAUDIA 14 DAY READER) CECILIO  Use  to read blood sugars as per 's instructions.             Continuous Blood Gluc Sensor (FREESTYLE CLAUDIA 14 DAY SENSOR) MISC  Change every 14 days.             insulin aspart (NOVOLOG PEN) 100 UNIT/ML pen  Inject 1-15 Units Subcutaneous 3 times daily (with meals)             insulin aspart (NOVOLOG PEN) 100 UNIT/ML pen  Inject 1-5 Units Subcutaneous At Bedtime MEDIUM INSULIN RESISTANCE DOSING    Do Not give Bedtime Correction Insulin if BG less than  200.   For  - 249 give 1 units.   For  - 299 give 2 units.   For  - 349 give 3 units.   For  -399 give 4 units.   For BG greater than or equal to 400 give 5 units.  Notify provider if glucose greater than or equal to 350 mg/dL after administration of correction dose. If given at mealtime, administer within 30 minutes of start of meal             lamiVUDine (EPIVIR) 100 MG tablet  Take 1 tablet (100 mg) by mouth daily             lisinopril (ZESTRIL) 2.5 MG tablet  Take 1 tablet (2.5 mg) by mouth daily             methocarbamol (ROBAXIN) 750 MG tablet  Take 1 tablet (750 mg) by mouth 3 times daily as needed for muscle spasms (sternal pain)             Multiple Vitamin (THERAVITE PO)  Take 1 tablet by mouth every morning              mycophenolate (GENERIC EQUIVALENT) 250 MG capsule  Take 3 capsules (750 mg) by mouth every 12 hours             order for DME  1 Device by Device route daily Knee high compression socks 15-20 mmhg.             oxyCODONE (ROXICODONE) 5 MG tablet  Take 1 tablet (5 mg) by mouth every 4 hours as needed for moderate to severe pain             pantoprazole (PROTONIX) 40 MG EC tablet  TAKE ONE TABLET BY MOUTH EVERY MORNING BEFORE BREAKFAST             polyethylene glycol (MIRALAX) 17 g packet  Take 1 packet by mouth daily             predniSONE (DELTASONE) 5 MG tablet  Take 1 tablet (5 mg) by mouth daily             rosuvastatin (CRESTOR) 5 MG tablet  Take 1 tablet (5 mg) by mouth daily             senna-docusate  (SENOKOT-S/PERICOLACE) 8.6-50 MG tablet  Take 1 tablet by mouth 2 times daily as needed for constipation             tamsulosin (FLOMAX) 0.4 MG capsule  Take 1 capsule (0.4 mg) by mouth daily             vitamin B-12 (CYANOCOBALAMIN) 1000 MCG tablet  Take 1 tablet (1,000 mcg) by mouth daily             vitamin D3 (CHOLECALCIFEROL) 50 mcg (2000 units) tablet  Take 1 tablet (50 mcg) by mouth daily                 CC:stuart Moe, Nerissa Toscano, Tish Rao, Eloy Conner    *Greater than 30 minutes were spent with this patient and greater than 50% of that time was spent in counseling and coordination of care and special instructions.     [facility]   Cardiothoracic Surgery  Office phone: [phone]  Office fax: [phone]

## 2021-07-21 NOTE — PLAN OF CARE
[id number] 7/21/2021    Patient is a 56 year old male admitted for NSTEMI/cabg x2 (done 7/14) with a PMH of CAD with non-occlusive LM disease, HTN, HLD, T2DM, chronic anemia, CKD III, ESTRADA cirrohsis s/p liver transplant 11/2019, possible HCC s/p TACE, h/o asxs HIV infection, Bipolar I, MDD, h/o gastric ulcers w/o CMV or H Pylori, and BPH who presents with NSTEMI with finding of 90% LM disease. S/p IABP. Patients team is CVTS.    Neuro: A&Ox4; patient calls appropriately   Cardiac: Sinus rhythm; peripheral pulses palpable; patient denies chest pain; PRN Hydralazine available for SBP > 165; patient should wear PCD pumps when in bed   - Orthostatic BP taken today with therapy (in separate note)   - One unit of red blood cells transfused today   Respiratory: WDL; on room air; clear lung sounds bilaterally; no cough or sputum; 97% oxygenation  GI/: WDL; bowel sounds active x4; last BM 7/20; produces urine well; frequency and urgency normal  Endocrine: WDL; blood sugar checks ACHS  Diet/Appetite: Low saturated fat/low sodium diet; thin liquids; appropriate appetite; patient is on carb counts  Skin: Surgical incision site on chest (cleaned and open to air); leg incision site where vein was harvested (cleaned and open to air; no signs on infection); 3 old chest tube sites medial abdomen (all cleaned and covered this morning); otherwise skin widespread is thin and intact; patient repositions independently    - Patient had a shower today  LDA: Left PIV (saline locked)  Activity: Patient is independent in room but will call if he needs assistance when ambulating  Pain: Patient has incision site pain at his chest and leg; PRN Oxycodone q4 hours available  Plan: Discharge home tomorrow with home cares, 7/22 (has a ride set up already)

## 2021-07-21 NOTE — PROGRESS NOTES
Cardiovascular Surgery Progress Note  07/21/2021         Assessment and Plan:     Frandy Workman is a 56 year old male with history of HTN, HLD, DM2, chronic anemia, stage 3 CKD, HCC and ESTRADA cirrhosis s/p liver transplant 11/2019, h/o HIV infection, recurrent gastric ulcers, MDD and bipolar I. Miller was admitted 7/13/21 with 3-4 days of unstable angina and found to have had NSTEMI, IABP placed and started on Hep gtt. Found to have multivessel CAD with 90% stenosis of the mid-LM at trifurcation. He was worked up for urgent CAB.   Frandy is now s/p elective 2 vessel CAB on 7/14/21 with Dr. Tom Zapata.    Cardiovascular:   Ustable angina, NSTEMI, and multivessel CAD  S/p 2 vessel CAB (LAD, OM2)  No arrhythmia, HD stable.  Pre-op echo showed LV EF 45-50%, intra-op echo ~55%.    mg, rosuvastatin  Coreg 6.25 mg PO BID, Lisinopril 2.5 mg daily.   Chest tubes and TPW have been removed.     Pulmonary:  - Extubated in the OR. Now saturating well on RA.   - Supplemental O2 PRN to keep sats > 92%. Wean off as tolerated.  - Pulm toilet, IS, activity and deep breathing    Neurology / MSK:  Depression   Bipolar I  Diabetic retinopathy   - Neuro intact. PTA Abilify.   - Acute post-operative pain well controlled with acetaminophen, PO oxycodone PRN     / Renal:  BPH  CKD stage 3 (creat ~ 1.6)  - Most recent creatinine 1.03, adequate UOP. He is below his pre-op weight of 81.6 kg.    - PTA tamsulosin.     GI / FEN:   Hx Cholelithiasis  Hx Liver cirrhosis secondary to ESTRADA, Hepatocellular carcinoma  Hx Liver transplant   Hx Gastric ulcers   - Regular diet, continue bowel regimen and PPI.   - Replace electrolytes as needed, hepatic enzymes WNL.    Endocrine:  Well Controlled DMT2  Admit A1C 6.8. Stress induced hyperglycemia initially managed on insulin drip postop. Discussed with Endocrine 7/20, transitioned to medium resistance sliding scale and prandial coverage.    Infectious Disease:  Stress induced leukocytosis  -  "WBC WNL, remains afebrile, no signs or symptoms of infection  - Completed perioperative antibiotics    Hematology:   Chronic Anemia   HIV +  - PTA Epivir   Acute blood loss anemia and thrombocytopenia  Hgb 7.0; Plt 120, no signs or symptoms of active bleeding.    Anticoagulation:   - ASA only    Prophylaxis:   - Stress ulcer prophylaxis: Pantoprazole 40 mg daily for 30 days  - DVT prophylaxis: Subcutaneous heparin, SCD    Disposition:   - Transferred to  on 7/20  - Therapies recommending discharge to home, but he does live alone. He has his groceries delivered to his house and a friend that drives him around PRN. Does not use a walker or cane except in the winter to prevent falls.   - DC home 7/22 if doing well with therapies     Discussed with CVTS Fellow as needed.  Staff surgeons have been informed of changes through both verbal and written communication.      David Rodríguez PA-C  Cardiothoracic Surgery  Pager [phone]    8:29 AM   July 21, 2021        Interval History:     No overnight events. Up from ICU last night.   States pain is well managed on current regimen. Slept well overnight.  Tolerating diet, is passing flatus, + BM. No nausea or vomiting.  Breathing well without complaints.   Working with therapies and ambulating in halls with assistance.   Denies chest pain, palpitations, dizziness, syncopal symptoms, fevers, chills, myalgias, or sternal popping/clicking.         Physical Exam:   Blood pressure 133/69, pulse 91, temperature 98.2  F (36.8  C), temperature source Oral, resp. rate 17, height 1.753 m (5' 9\"), weight 73.9 kg (162 lb 14.4 oz), SpO2 96 %.  Vitals:    07/19/21 0400 07/20/21 0000 07/21/21 0320   Weight: 77 kg (169 lb 12.1 oz) 75 kg (165 lb 5.5 oz) 73.9 kg (162 lb 14.4 oz)      Weight; - 8 kg since admit and trending down.   24 hr Fluid status; net gain 330 mL.   MAPs: 85 - 107    Gen: A&Ox4, NAD  Neuro: no focal deficits   CV: RRR, normal S1 S2, no murmurs, rubs or gallops.   Pulm: " CTA, no wheezing or rhonchi, normal breathing on RA  Abd: nondistended, normal BS, soft, nontender  Ext: no peripheral edema  Incision: clean, dry, intact, no erythema, sternum stable  Tubes/drain sites: dressing clean and dry         Data:    Imaging:  reviewed recent imaging, no acute concerns    Labs:  BMP  Recent Labs   Lab 07/21/21  0617 07/20/21  2233 07/20/21  1729 07/20/21  1142 07/20/21  0609 07/19/21  0516 07/18/21  1056     --   --   --  140 140 138   POTASSIUM 4.1  --   --   --  4.2 3.9 4.3   CHLORIDE 104  --   --   --  107 109 107   DEBORAH 7.1*  --   --   --  7.6* 7.3* 7.5*   CO2 23  --   --   --  26 22 24   BUN 18  --   --   --  22 25 27   CR 1.03  --   --   --  1.08 1.05 1.11   * 173* 202* 210* 135* 117* 196*     CBC  Recent Labs   Lab 07/21/21  0617 07/20/21  0609 07/19/21  0516 07/18/21  1056   WBC 4.0 3.9* 4.7 4.8   RBC 2.46* 2.50* 2.70* 2.73*   HGB 7.0* 7.3* 7.8* 7.8*   HCT 22.6* 23.0* 25.0* 24.9*   MCV 92 92 93 91   MCH 28.5 29.2 28.9 28.6   MCHC 31.0* 31.7 31.2* 31.3*   RDW 17.0* 16.7* 16.4* 17.0*   * 110* 111* 100*     INR  Recent Labs   Lab 07/14/21  1351 07/14/21  1215   INR 1.28* 1.45*      Hepatic Panel  Recent Labs   Lab 07/21/21  0617 07/20/21  0609 07/19/21  0516 07/18/21  1056   AST 12 12 16 18   ALT 17 19 28 16   ALKPHOS 126 147 149 158*   BILITOTAL 0.5 0.4 0.6 0.5   ALBUMIN 2.7* 2.6* 2.6* 2.6*     GLUCOSE:   Recent Labs   Lab 07/21/21  0617 07/20/21  2233 07/20/21  1729 07/20/21  1142 07/20/21  0749 07/20/21  0609   * 173* 202* 210* 140* 135*

## 2021-07-21 NOTE — PLAN OF CARE
D: Admitted 7/13 w/ NSTEMI, now s/p IABP and CABG x2 on 7/14. Hx of HTN, HLD, DM2, chronic anemia, CKD3, HCC and ESTRADA cirrhosis /sp liver transplant 11/19, h/o HIV infection, recurrent gastric ulcers, MDD and bipolar 1     I/A: A/Ox4. VSS on RA. Endorses incisional chest pain managed with oxy x1. Incisions cleaned and SUDHA. Cardiac diet, good appetite, BG ACHS with carb coverage. Voiding adequately. No BM this shift. Up SBA + GB. Phos replaced per protocol.     P: Encourage pulm hygiene, oral intake and therapies. PT/OT. Continue to monitor and notify CVTS with changes/concerns.

## 2021-07-21 NOTE — PLAN OF CARE
Orthostatic BP    Laying- 117/70 (map: 88)  Sitting- 131/68 (map: 86)  Standing- 106/61 (map: 83)

## 2021-07-21 NOTE — PROGRESS NOTES
Care Management Follow Up    Length of Stay (days): 9    Expected Discharge Date: 07/22/2021     Concerns to be Addressed: Discharge planning  Patient plan of care discussed at interdisciplinary rounds: Yes    Anticipated Discharge Disposition: Home      Anticipated Discharge Services: Home Care  Anticipated Discharge DME: n/a    Patient/family educated on Medicare website which has current facility and service quality ratings: Yes  Education Provided on the Discharge Plan: Yes  Patient/Family in Agreement with the Plan: Yes    Referrals Placed by CM/SW: Home Care  Private pay costs discussed: Not applicable    Additional Information:  This writer spoke with OT who worked with pt yesterday and today. Per OT, pt did well yesterday and was able to ambulate to the gym and complete stairs both sessions. Per OT, pt was able to shower this morning with just a few verbal cues. Pt's activity this morning was limited by hypotension. OT recommending home with home PT/OT.   This writer met with pt who states he feels comfortable going home when ready for discharge. Pt receiving a blood transfusion this afternoon and states he is feeling better. Pt agreeable to home care services, provided Medicare.gov list. Pt states his first preference would be [facility] or any agency that could see him soon after discharge. Pt states that his friend Gabrielle ELENA will be his ride home from the hospital and can assist with rides to follow up appts.  Per [facility]/ Home Care liason they would be unable to start care until at least 7/26. This writer called and spoke with elle Leija at [facility] (Ph: [phone], Fax: [phone]) who stated they can likely start care on 7/23 or 7/24. Pt states he would prefer to use [facility] since they start care sooner. [facility], awaiting home care to accept referral.    CC will continue to monitor patient's medical condition and progress towards discharge.  Terra  Rodrigo RN BSN  6C Unit Care Coordinator  Phone number: [phone]  Pager: [phone]      ]

## 2021-07-22 ENCOUNTER — APPOINTMENT (OUTPATIENT)
Dept: OCCUPATIONAL THERAPY | Facility: CLINIC | Age: 56
DRG: 234 | End: 2021-07-22
Payer: MEDICARE

## 2021-07-22 VITALS
TEMPERATURE: 98.5 F | SYSTOLIC BLOOD PRESSURE: 115 MMHG | HEART RATE: 85 BPM | HEIGHT: 69 IN | OXYGEN SATURATION: 96 % | RESPIRATION RATE: 16 BRPM | DIASTOLIC BLOOD PRESSURE: 85 MMHG | WEIGHT: 162 LBS | BODY MASS INDEX: 23.99 KG/M2

## 2021-07-22 LAB
ERYTHROCYTE [DISTWIDTH] IN BLOOD BY AUTOMATED COUNT: 16.5 % (ref 10–15)
GLUCOSE BLDC GLUCOMTR-MCNC: 171 MG/DL (ref 70–99)
HCT VFR BLD AUTO: 24.6 % (ref 40–53)
HGB BLD-MCNC: 7.7 G/DL (ref 13.3–17.7)
HOLD SPECIMEN: NORMAL
MCH RBC QN AUTO: 29.2 PG (ref 26.5–33)
MCHC RBC AUTO-ENTMCNC: 31.3 G/DL (ref 31.5–36.5)
MCV RBC AUTO: 93 FL (ref 78–100)
PLATELET # BLD AUTO: 115 10E3/UL (ref 150–450)
RBC # BLD AUTO: 2.64 10E6/UL (ref 4.4–5.9)
WBC # BLD AUTO: 4.3 10E3/UL (ref 4–11)

## 2021-07-22 PROCEDURE — 36415 COLL VENOUS BLD VENIPUNCTURE: CPT | Performed by: PHYSICIAN ASSISTANT

## 2021-07-22 PROCEDURE — 250N000013 HC RX MED GY IP 250 OP 250 PS 637: Performed by: PHYSICIAN ASSISTANT

## 2021-07-22 PROCEDURE — 97530 THERAPEUTIC ACTIVITIES: CPT | Mod: GO

## 2021-07-22 PROCEDURE — 97535 SELF CARE MNGMENT TRAINING: CPT | Mod: GO

## 2021-07-22 PROCEDURE — 85049 AUTOMATED PLATELET COUNT: CPT | Performed by: PHYSICIAN ASSISTANT

## 2021-07-22 PROCEDURE — 250N000013 HC RX MED GY IP 250 OP 250 PS 637: Performed by: SURGERY

## 2021-07-22 PROCEDURE — 250N000013 HC RX MED GY IP 250 OP 250 PS 637: Performed by: STUDENT IN AN ORGANIZED HEALTH CARE EDUCATION/TRAINING PROGRAM

## 2021-07-22 PROCEDURE — 250N000011 HC RX IP 250 OP 636: Performed by: SURGERY

## 2021-07-22 PROCEDURE — 250N000012 HC RX MED GY IP 250 OP 636 PS 637: Performed by: STUDENT IN AN ORGANIZED HEALTH CARE EDUCATION/TRAINING PROGRAM

## 2021-07-22 RX ORDER — METHOCARBAMOL 750 MG/1
750 TABLET, FILM COATED ORAL 3 TIMES DAILY PRN
Qty: 60 TABLET | Refills: 0 | Status: SHIPPED | OUTPATIENT
Start: 2021-07-22 | End: 2023-01-04

## 2021-07-22 RX ORDER — AMOXICILLIN 250 MG
1 CAPSULE ORAL 2 TIMES DAILY PRN
Qty: 50 TABLET | Refills: 0 | Status: SHIPPED | OUTPATIENT
Start: 2021-07-22 | End: 2022-11-16

## 2021-07-22 RX ORDER — CARVEDILOL 6.25 MG/1
6.25 TABLET ORAL 2 TIMES DAILY WITH MEALS
Qty: 60 TABLET | Refills: 0 | Status: SHIPPED | OUTPATIENT
Start: 2021-07-22 | End: 2021-07-30 | Stop reason: ALTCHOICE

## 2021-07-22 RX ORDER — OXYCODONE HYDROCHLORIDE 5 MG/1
5 TABLET ORAL EVERY 4 HOURS PRN
Qty: 20 TABLET | Refills: 0 | Status: SHIPPED | OUTPATIENT
Start: 2021-07-22 | End: 2022-03-15

## 2021-07-22 RX ORDER — LISINOPRIL 2.5 MG/1
2.5 TABLET ORAL DAILY
Qty: 60 TABLET | Refills: 0 | Status: SHIPPED | OUTPATIENT
Start: 2021-07-23 | End: 2021-07-27

## 2021-07-22 RX ADMIN — HEPARIN SODIUM 5000 UNITS: 5000 INJECTION, SOLUTION INTRAVENOUS; SUBCUTANEOUS at 03:55

## 2021-07-22 RX ADMIN — PANTOPRAZOLE SODIUM 40 MG: 40 TABLET, DELAYED RELEASE ORAL at 07:36

## 2021-07-22 RX ADMIN — ASPIRIN 162 MG: 81 TABLET, COATED ORAL at 07:36

## 2021-07-22 RX ADMIN — TAMSULOSIN HYDROCHLORIDE 0.4 MG: 0.4 CAPSULE ORAL at 07:36

## 2021-07-22 RX ADMIN — MYCOPHENOLATE MOFETIL 750 MG: 250 CAPSULE ORAL at 07:36

## 2021-07-22 RX ADMIN — LISINOPRIL 2.5 MG: 2.5 TABLET ORAL at 07:36

## 2021-07-22 RX ADMIN — LAMIVUDINE 100 MG: 100 TABLET, FILM COATED ORAL at 07:36

## 2021-07-22 RX ADMIN — FUROSEMIDE 20 MG: 20 TABLET ORAL at 07:36

## 2021-07-22 RX ADMIN — ROSUVASTATIN CALCIUM 5 MG: 5 TABLET, FILM COATED ORAL at 07:36

## 2021-07-22 RX ADMIN — PREDNISONE 5 MG: 5 TABLET ORAL at 07:36

## 2021-07-22 RX ADMIN — INSULIN ASPART 1 UNITS: 100 INJECTION, SOLUTION INTRAVENOUS; SUBCUTANEOUS at 07:34

## 2021-07-22 RX ADMIN — CARVEDILOL 6.25 MG: 6.25 TABLET, FILM COATED ORAL at 07:36

## 2021-07-22 ASSESSMENT — ACTIVITIES OF DAILY LIVING (ADL)
ADLS_ACUITY_SCORE: 16

## 2021-07-22 ASSESSMENT — MIFFLIN-ST. JEOR: SCORE: 1550.21

## 2021-07-22 NOTE — PLAN OF CARE
Occupational Therapy and Cardiac Rehab Discharge Summary    Reason for therapy discharge:    Discharged to home with home therapy.    Progress towards therapy goal(s). See goals on Care Plan in [facility] electronic health record for goal details.  Goals partially met.  Barriers to achieving goals:   discharge from facility.    Therapy recommendation(s):    Continued therapy is recommended.  Rationale/Recommendations:  Pt will benefit from continued home therapy to maximize functional independence and activity tolerance with bridge to OP CR.

## 2021-07-22 NOTE — DISCHARGE SUMMARY
Discharge    Discharged to: Home  Via: Ride with family friend  Accompanied by: Family friend  Belongings: Sent with patient  Teaching: Diabetic education (diet and insulin coverage)  Clinic appointment: July 26th (on mychart)  Report called/faxed: None  Tele and IV: Both discontinued

## 2021-07-22 NOTE — PROGRESS NOTES
Care Coordinator  D/I: I called [facility]: Bing approx [phone] and she has accepted the pt for RN/PT/OT.  P: I have informed LILLIAN Ugarte--pt will discharge to home today. I will fax discharge orders to [facility].

## 2021-07-22 NOTE — PLAN OF CARE
Problem: Adult Inpatient Plan of Care  Goal: Patient-Specific Goal (Individualized)  Outcome: Adequate for Discharge  Goal: Absence of Hospital-Acquired Illness or Injury  Outcome: Adequate for Discharge  Intervention: Identify and Manage Fall Risk  Recent Flowsheet Documentation  Taken 7/22/2021 0800 by Malena Swanson RN  Safety Promotion/Fall Prevention:   activity supervised   mobility aid in reach   nonskid shoes/slippers when out of bed  Intervention: Prevent Skin Injury  Recent Flowsheet Documentation  Taken 7/22/2021 0700 by Malena Swanson RN  Body Position: position changed independently  Intervention: Prevent and Manage VTE (Venous Thromboembolism) Risk  Recent Flowsheet Documentation  Taken 7/22/2021 0800 by Malena Swanson RN  VTE Prevention/Management:   ambulation promoted   fluids promoted  Intervention: Prevent Infection  Recent Flowsheet Documentation  Taken 7/22/2021 0800 by Malena Swanson RN  Infection Prevention:   hand hygiene promoted   rest/sleep promoted  Goal: Optimal Comfort and Wellbeing  Outcome: Adequate for Discharge  Intervention: Provide Person-Centered Care  Recent Flowsheet Documentation  Taken 7/22/2021 0800 by Malena Swanson RN  Trust Relationship/Rapport: care explained  Goal: Readiness for Transition of Care  Outcome: Adequate for Discharge

## 2021-07-22 NOTE — DISCHARGE INSTRUCTIONS
7/22 ________________________________________________________  Discharge RN please fax discharge orders to home care agency:  [facility] (Ph: [phone], Fax: [phone]) --7/22 done by 6C CC: Desirae BOJORQUEZ RN skilled visits  RN to assess pain/hydration/incision/nutrition/home safety    RN to draw labs per MD orders--report results to:  Outpatient CAre Coordinator (s/p liver transplant) Radha Ndiaye  Ph: [phone]  Fax: [phone]                                  AFTER YOU GO HOME FROM YOUR HEART SURGERY  (2 vessel coronary artery bypass 7/14/21 with Dr Tom Zapata)    You had a sternotomy, avoid lifting anything greater than ten pounds for 6 weeks after surgery and then less than 20 pounds for an additional 6 weeks. Do not reach backwards or use arms to push out of chair. Do not let people pull on your arms to assist with standing. Avoid twisting or reaching too far across your body.  Avoid strenuous activities such as bowling, vacuuming, raking, shoveling, golf or tennis for 12 weeks after your surgery. It is okay to resume sex if you feel comfortable in doing so. You may have to try different positions with your partner.  Splint your chest incision by hugging a pillow or bringing your arms across your chest when coughing or sneezing. Please try to sleep on your back for the first 4-6 weeks to avoid extra stress on your sternum (breastbone) while it is healing.     No driving for 4 weeks after surgery or while on pain medication.     Shower or wash your incisions daily with soap and water (or as instructed), pat dry. Keep wound clean and dry, showers are okay after discharge, but don't let spray hit directly on incision. No baths or swimming for 1 month. Cover chest tube sites with gauze until they stop draining, then leave open to air. It is not abnormal for chest tube sites to drain yellowish/clear fluid for up to 2-3 weeks after surgery.   Watch for signs of infection: increased redness,  tenderness, warmth or any drainage from sternum incision.  Also a temperature > 100.5 F or chills. Call your surgeon or primary care provider's office immediately. Remove any skin glue left on incisions after 10-14 days. This will not affect your incision and can speed up healing.    Exercise is very important in your recovery. Please follow the guidelines set up for you in your cardiac rehab classes at the hospital. If outpatient cardiac rehab was ordered for you, we highly recommend you participate. If you have problems arranging your cardiac rehab, please call [phone] for all locations, with the exception of [place], please call [phone] and [facility] [place], please call [phone].    Avoid sitting for prolonged periods of time, try to walk every hour during the day. If you have a leg incision, elevate your leg often when you are not walking.    Check your weight when you get home from the hospital and continue to check it daily through your recovery for at least a month. If you notice a weight gain of 2-3 pounds in a week, notify your primary care physician, cardiologist or surgeon.    Bowel activity may be slow after surgery. If necessary, you may take an over the counter laxative such as Milk of Magnesia or Miralax. You may have stool softeners prescribed (docusate sodium, Senokot). We recommend using stool softeners while using narcotics for pain (oxycodone/percocet, hydrocodone/vicodin, hydromorphone/dilaudid).      Wean OFF of narcotics (oxycodone, dilaudid, hydrocodone) as soon as possible. You should continue taking acetaminophen as long as you have any surgical pain as the first choice for pain control and add narcotics as necessary for pain to be tolerable.      DO NOT SMOKE.  IF YOU NEED HELP QUITTING, PLEASE TALK WITH YOUR CARDIOLOGIST OR PRIMARY DOCTOR.    REGARDING PRESCRIPTION REFILLS.  If you need a refill on your pain medication contact us to discuss your pain and a possible one time  refill.   All other medications will be adjusted, discontinued and re-filled by your primary care physician and/or your cardiologist as they were prior to your surgery. We have given you enough for one to three month with possibly one refill.    POST-OPERATIVE CLINIC VISITS  You have a follow up visit with CV Surgery Advance Care Practitioners on 7/27/21 at 3 pm at the [facility].  You will then return to the care of your primary provider and your cardiologist. Future medication refills should come from your PCP or Cardiologist.   You have a lab draw on 7/26/21 for chronic anemia.   You should see your primary care provider in 2-4 weeks after discharge.   It is important to see your cardiologist 7/27/21, as scheduled.    If you do not hear from a  in 7 days, please call [phone] (choose option 1) and request to be seen with a general cardiologist or someone that you have seen in the past.   If there is a need to return to see CT Surgery please call our  at [phone].    SURGICAL QUESTIONS  Please call Mitzi Henderson or Wendy Morris with surgical recovery and medication questions, their phone numbers are listed below.  They will assist you with your needs and contact other surgery care team members as indicated.    On weekends or after hours, please call [phone] and ask the  to   page the Cardiothoracic Surgery fellow on call.      Thank you,    Your Cardiothoracic Surgery Team  Mitzi Henderson RN Care Coordinator-  [phone]   Wendy Morris RN Care Coordinator-  [phone]

## 2021-07-22 NOTE — PLAN OF CARE
[id number] 7/22/2021     Patient is a 56 year old male admitted for NSTEMI/cabg x2 (done 7/14) with a PMH of CAD with non-occlusive LM disease, HTN, HLD, T2DM, chronic anemia, CKD III, ESTRADA cirrohsis s/p liver transplant 11/2019, possible HCC s/p TACE, h/o asxs HIV infection, Bipolar I, MDD, h/o gastric ulcers w/o CMV or H Pylori, and BPH who presents with NSTEMI with finding of 90% LM disease. S/p IABP. Patients team is CVTS.     Neuro: A&Ox4; patient calls appropriately   Cardiac: Sinus rhythm; peripheral pulses palpable; patient denies chest pain; PRN Hydralazine available for SBP > 165; patient should wear PCD pumps when in bed  Respiratory: WDL; on room air; clear lung sounds bilaterally; no cough or sputum; 97% oxygenation  GI/: WDL; bowel sounds active x4; last BM 7/22 AM; produces urine well; frequency and urgency normal  Endocrine: WDL; blood sugar checks ACHS  Diet/Appetite: Low saturated fat/low sodium diet; thin liquids; appropriate appetite; patient is on carb counts  Skin: Surgical incision site on chest (cleaned and open to air); leg incision site where vein was harvested (cleaned and open to air; no signs on infection); 3 old chest tube sites medial abdomen (all cleaned and covered this morning); otherwise skin widespread is thin and intact; patient repositions independently   LDA: Left PIV (saline locked)  Activity: Patient is independent in room but will call if he needs assistance when ambulating  Pain: Patient has incision site pain at his chest and leg; PRN Oxycodone q4 hours available  Plan: Discharge home today with home cares

## 2021-07-22 NOTE — DISCHARGE SUMMARY
Discharge    Discharged to: home in [place]  Via: automobile with Gabrielle (friend)  Accompanied by: Gabrielle (friend) is his transport   Belongings: Sent with patient  Teaching: About diabetic diet and insulin coverage  Clinic appointment: Lab appointment on 7/26 (patient aware and has mychart)  Report called/faxed: None (patient returning home)  Tele and IV: Both discontinued

## 2021-07-22 NOTE — PLAN OF CARE
Major Shift Events:  No issues overnight. Vitals stable. Pt able to rest well.  Awaiting morning lab results at this time.   Plan: Pt to discharge home today.   For vital signs and complete assessments, please see documentation flowsheets.    Problem: Adult Inpatient Plan of Care  Goal: Plan of Care Review  Outcome: Adequate for Discharge

## 2021-07-23 ENCOUNTER — CARE COORDINATION (OUTPATIENT)
Dept: CARDIOLOGY | Facility: CLINIC | Age: 56
End: 2021-07-23

## 2021-07-23 ENCOUNTER — TELEPHONE (OUTPATIENT)
Dept: INTERNAL MEDICINE | Facility: CLINIC | Age: 56
End: 2021-07-23

## 2021-07-23 NOTE — TELEPHONE ENCOUNTER
[facility] Call Center    Phone Message    May a detailed message be left on voicemail: yes     Reason for Call: Order(s): Home Care Orders: Skilled Nursing: request to delay start of care to 7/25/21 due to staffing,     Action Taken: Message routed to:  [facility] pcc    Travel Screening: Not Applicable

## 2021-07-23 NOTE — PROGRESS NOTES
HOW ARE YOU DOING  Tell me how you have been doing since you were discharged from the hospital?  Feeling pretty fatigued.     ACTIVITY  How is your activity tolerance? Ok, showering and doing ADLs independently.     POST OP MONITORING  How is your pain on a 0-10 scale, how are you managing your pain? 4-5/10.  Patient only using oxy twice daily.  Instructed patient to use Tylenol and robaxin during the day to manage pain better.  Patient verbalized understanding.     Are you still doing sternal precautions? Yes    Do you hear any clicking when you are moving or taking a deep breath?  no    Are you weighing yourself daily?  Patient does not have a scale.  He has some swelling in his feet but not much.  Instructed patient to elevate his legs when sitting.  Patient agreed.     Are you using the inspirometer? Yes.      SIGNS AND SYMPTOMS OF INFECTION  1. INCREASE IN PAIN no  2. FEVER no  3. DRAINAGE no    If Yes, color:                 5. REDNESS no    6. SWELLING no    ASSISTANCE  Do you have someone at home to assist you with your daily activities?  Jennifer his friend is helping with cooking and laundry.  Patient has home care coming in beginning on Sunday.       MEDICATIONS  Is someone helping you to set up your medications?  Patient is independent.   Do you have any questions about your medications?  No     Are you on a blood thinner?  No  Who is managing your INRs?       FOLLOW UP  Are you scheduled for cardiac rehab? Has not yet received call.       You are scheduled to see our surgery advanced practice provider for post operative follow up on 07/30.   You are scheduled to see your cardiologist on 07/27.  You are scheduled to see your primary care physician on, didn't know he was to follow up, but will call for an appointment.          CONTACT INFORMATION  Please feel free to call us with any other questions or symptoms that are concerning for you at [phone], if it is after 4:30 in the afternoon, or a weekend  please call [phone] and ask for the on call specialist.  We want to do everything we can to help prevent you needing to return to the ED, so please do not hesitate to call us.

## 2021-07-23 NOTE — TELEPHONE ENCOUNTER
[facility] Call Center    Phone Message    May a detailed message be left on voicemail: yes     Reason for Call: Other: . Home care is asking for this to be completed by 3:30 today. I notified caller of 24 hour turn around time.    Action Taken: Message routed to:  [facility] primary care clinic    Travel Screening: Not Applicable

## 2021-07-23 NOTE — TELEPHONE ENCOUNTER
Verbal orders given to Beatriz from [facility], per Dr. Taylor, for Order(s): Home Care Orders: Skilled Nursing: request to delay start of care to 7/25/21 due to staffing. Also informed Beatriz that we do have after hours nurses that can give orders. Beatriz stated verbal understanding. Rosalinda Aguilera LPN 7/23/2021 2:04 PM

## 2021-07-26 ENCOUNTER — ALLIED HEALTH/NURSE VISIT (OUTPATIENT)
Dept: TRANSPLANT | Facility: CLINIC | Age: 56
End: 2021-07-26
Attending: INTERNAL MEDICINE
Payer: MEDICARE

## 2021-07-26 ENCOUNTER — LAB (OUTPATIENT)
Dept: LAB | Facility: CLINIC | Age: 56
End: 2021-07-26
Attending: INTERNAL MEDICINE
Payer: MEDICARE

## 2021-07-26 ENCOUNTER — TELEPHONE (OUTPATIENT)
Dept: PHARMACY | Facility: CLINIC | Age: 56
End: 2021-07-26

## 2021-07-26 VITALS — SYSTOLIC BLOOD PRESSURE: 94 MMHG | HEART RATE: 93 BPM | DIASTOLIC BLOOD PRESSURE: 62 MMHG

## 2021-07-26 DIAGNOSIS — N18.32 ANEMIA OF CHRONIC RENAL FAILURE, STAGE 3B (H): ICD-10-CM

## 2021-07-26 DIAGNOSIS — Z94.4 STATUS POST LIVER TRANSPLANTATION (H): ICD-10-CM

## 2021-07-26 DIAGNOSIS — D63.1 ANEMIA OF CHRONIC RENAL FAILURE, STAGE 3B (H): ICD-10-CM

## 2021-07-26 DIAGNOSIS — N18.32 STAGE 3B CHRONIC KIDNEY DISEASE (H): Primary | ICD-10-CM

## 2021-07-26 LAB
ALBUMIN SERPL-MCNC: 3.4 G/DL (ref 3.4–5)
ALP SERPL-CCNC: 121 U/L (ref 40–150)
ALT SERPL W P-5'-P-CCNC: 19 U/L (ref 0–70)
ANION GAP SERPL CALCULATED.3IONS-SCNC: 11 MMOL/L (ref 3–14)
AST SERPL W P-5'-P-CCNC: 12 U/L (ref 0–45)
BILIRUB DIRECT SERPL-MCNC: 0.2 MG/DL (ref 0–0.2)
BILIRUB SERPL-MCNC: 0.7 MG/DL (ref 0.2–1.3)
BUN SERPL-MCNC: 30 MG/DL (ref 7–30)
CALCIUM SERPL-MCNC: 9.8 MG/DL (ref 8.5–10.1)
CHLORIDE BLD-SCNC: 106 MMOL/L (ref 94–109)
CO2 SERPL-SCNC: 20 MMOL/L (ref 20–32)
CREAT SERPL-MCNC: 1.31 MG/DL (ref 0.66–1.25)
ERYTHROCYTE [DISTWIDTH] IN BLOOD BY AUTOMATED COUNT: 18.2 % (ref 10–15)
GFR SERPL CREATININE-BSD FRML MDRD: 60 ML/MIN/1.73M2
GLUCOSE BLD-MCNC: 207 MG/DL (ref 70–99)
HCT VFR BLD AUTO: 29.1 % (ref 40–53)
HGB BLD-MCNC: 8.8 G/DL (ref 13.3–17.7)
MCH RBC QN AUTO: 28.7 PG (ref 26.5–33)
MCHC RBC AUTO-ENTMCNC: 30.2 G/DL (ref 31.5–36.5)
MCV RBC AUTO: 95 FL (ref 78–100)
PLATELET # BLD AUTO: 205 10E3/UL (ref 150–450)
POTASSIUM BLD-SCNC: 5 MMOL/L (ref 3.4–5.3)
PROT SERPL-MCNC: 7.4 G/DL (ref 6.8–8.8)
RBC # BLD AUTO: 3.07 10E6/UL (ref 4.4–5.9)
SODIUM SERPL-SCNC: 137 MMOL/L (ref 133–144)
WBC # BLD AUTO: 8.8 10E3/UL (ref 4–11)

## 2021-07-26 PROCEDURE — 82248 BILIRUBIN DIRECT: CPT | Performed by: PATHOLOGY

## 2021-07-26 PROCEDURE — 80053 COMPREHEN METABOLIC PANEL: CPT | Performed by: PATHOLOGY

## 2021-07-26 PROCEDURE — 85027 COMPLETE CBC AUTOMATED: CPT | Performed by: PATHOLOGY

## 2021-07-26 PROCEDURE — 250N000011 HC RX IP 250 OP 636: Performed by: INTERNAL MEDICINE

## 2021-07-26 PROCEDURE — 36415 COLL VENOUS BLD VENIPUNCTURE: CPT | Performed by: PATHOLOGY

## 2021-07-26 PROCEDURE — 96372 THER/PROPH/DIAG INJ SC/IM: CPT | Performed by: INTERNAL MEDICINE

## 2021-07-26 RX ADMIN — DARBEPOETIN ALFA 40 MCG: 40 INJECTION, SOLUTION INTRAVENOUS; SUBCUTANEOUS at 11:33

## 2021-07-26 NOTE — NURSING NOTE
Chief Complaint   Patient presents with     Allied Health Visit     Aranesp     Blood pressure 94/62, pulse 93.    Frequency:  Every 14 days  Most recent or today's HGB: 8.8   Date: 21  Date of lat dose: 21  HGB associated with last dose given: 6.3    Blood Pressure:94/62    Diagnosis: Anemia    Ordered by: Eloy Rao MD  VIS Offered: Yes    Double Checked by: Rhonda SORIA CMA    See MAR for administration details    Pt's first name, last name and  verified prior to medication administration, injection given without complications or questions.     Nereida Steiner CMA

## 2021-07-26 NOTE — TELEPHONE ENCOUNTER
Anemia Management Note  SUBJECTIVE/OBJECTIVE:  Referred by Dr. Eloy Rao on 2021  Primary Diagnosis: Anemia in Chronic Kidney Disease (N18.3, D63.1)   3b  Secondary Diagnosis:  Chronic Kidney Disease, Stage 3 (N18.3)  3b  Liver Tx: 2019  Hgb goal range:  9-10  Epo/Darbo: Aranesp 40mcg every 14 days for Hgb <10.  In Clinic.   Iron regimen:  NA.  Iron levels stable  Labs : 2022  Recent IVELISSE use, transfusion, IV iron: Aranesp   RX/TX plans :  6/10/2022     No history of stroke, MI and blood clots.  History of hepatocellular carcinoma - s/p TACE 2019 and liver transplant 2019.     Contact:            Ok to leave message regarding scheduling, medical, and billing per consent to communicate dated 10/2/19                              OK to speak with Davi Saunders (friend/POA) regarding scheduling, medical, and billing per consent to communicate dated 10/2/19    Anemia Latest Ref Rng & Units 2021   IVELISSE Dose - - - - - - - 40mcg   Hemoglobin 13.3 - 17.7 g/dL 7.8(L) 7.8(L) 7.3(L) 7.0(L) 7.1(L) 7.7(L) 8.8(L)   Ferritin 26 - 388 ng/mL - - - - - - -   PRBCs - - - - - - - -     BP Readings from Last 3 Encounters:   21 94/62   21 115/85   21 109/67     Wt Readings from Last 2 Encounters:   21 162 lb (73.5 kg)   21 173 lb 11.2 oz (78.8 kg)           ASSESSMENT:  Hgb:Not at goal but improving - recommend dose and continue current regimen  TSat: at goal >30% Ferritin: At goal (>100ng/mL)    PLAN:  Dose with aranesp and RTC for hgb then aranesp if needed in 2 week(s)    Orders needed to be renewed (for next follow-up date) in [facility]: None    Iron labs due:  2021    Plan discussed with:  No call, chart review  Plan provided by:  adri    NEXT FOLLOW-UP DATE:  21   11a appt    Jie Blum RN   Anemia Services  [address] [address]   [email or web address]    Office : [phone]  Fax: [phone]

## 2021-07-26 NOTE — PROGRESS NOTES
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    He reports that since May started to have palpitations. They would occur every night. They would wake him from sleep. Occasionally occurring during the day. No lightheadedness, dizziness, or syncope. Frequency has decreased, but still occurs once in awhile at night. Last time was in the middle of the night when an ambulance driving by. No SOB or ROONEY, he lives on 4th floor and can walk up all 4 flights w/o SOB or CP.    Clinic visit 7/27/2021:  Since our last visit, patient was admitted to the hospital from 7/13/21 to 7/22/21. Patient presented with NSTEMI. He was found to have severe distal LM disease at the trifurcation of LCx, LAD, and ramus. On 7/14/21, patient underwent CABG (LIMA to LAD and SVG to OM2). His hospital stay was uncomplicated and was discharged home on 7/22/21.    Patient states, he is recovering well from his surgery. He does have pain at the chest incision site with movement. No clicking or abnormal sound at the sternotomy site. He is able to ambulate without difficulty. He has no PND, orthopnea, or LE edema.       PAST MEDICAL HISTORY:  Past Medical History:   Diagnosis Date     Anemia 2013     Arthritis      BPH (benign prostatic hyperplasia)      CAD (coronary artery disease) 4/1/2019     Cholelithiasis      Conductive hearing loss 08/16/2017     Depressive disorder 1986    Suffer effects throughout life     Gastroesophageal reflux disease 12/01/2014     HCC (hepatocellular carcinoma) (H) 1/22/2019     History of diabetic retinopathy 07/2018     HTN (hypertension)      HTN (hypertension) 11/20/2019     Hyperlipidemia      Liver cirrhosis secondary to ESTRADA (H)      Liver transplanted (H) 11/11/2019     Portal vein  thrombosis 4/11/2019     Type II diabetes mellitus (H)        CURRENT MEDICATIONS:  Current Outpatient Medications   Medication Sig Dispense Refill     Acetaminophen (TYLENOL) 325 MG CAPS Take 325-650 mg by mouth every 4 hours as needed (pain, fever) 100 capsule 1     ARIPiprazole (ABILIFY) 5 MG tablet daily       Artificial Tear Solution (SM ARTIFICIAL TEARS) SOLN Place 1 drop into the right eye every hour as needed (dry eyes) Apply at least 4 times daily and as needed for dry eye 15 mL 1     aspirin (ASA) 81 MG EC tablet Take 2 tablets (162 mg) by mouth daily 60 tablet 0     BD VIKTORIA U/F 32G X 4 MM insulin pen needle Use 5 per day 300 each 3     carvedilol (COREG) 6.25 MG tablet Take 1 tablet (6.25 mg) by mouth 2 times daily (with meals) 60 tablet 0     ciclopirox (LOPROX) 0.77 % cream Apply topically 2 times daily To feet and toenails. 90 g 5     Continuous Blood Gluc  (FREESTYLE CLAUDIA 14 DAY READER) CECILIO Use to read blood sugars as per 's instructions. 1 each 0     Continuous Blood Gluc Sensor (FREESTYLE CLAUDIA 14 DAY SENSOR) MISC Change every 14 days. 9 each 3     insulin aspart (NOVOLOG PEN) 100 UNIT/ML pen Inject 1-5 Units Subcutaneous At Bedtime MEDIUM INSULIN RESISTANCE DOSING    Do Not give Bedtime Correction Insulin if BG less than  200.   For  - 249 give 1 units.   For  - 299 give 2 units.   For  - 349 give 3 units.   For  -399 give 4 units.   For BG greater than or equal to 400 give 5 units.  Notify provider if glucose greater than or equal to 350 mg/dL after administration of correction dose. If given at mealtime, administer within 30 minutes of start of meal 3 mL 0     insulin aspart (NOVOLOG PEN) 100 UNIT/ML pen Inject 1-15 Units Subcutaneous 3 times daily (with meals) 20 mL 3     lamiVUDine (EPIVIR) 100 MG tablet Take 1 tablet (100 mg) by mouth daily 90 tablet 3     lisinopril (ZESTRIL) 2.5 MG tablet Take 1 tablet (2.5 mg) by mouth daily 60 tablet 0      methocarbamol (ROBAXIN) 750 MG tablet Take 1 tablet (750 mg) by mouth 3 times daily as needed for muscle spasms (sternal pain) 60 tablet 0     Multiple Vitamin (THERAVITE PO) Take 1 tablet by mouth every morning        mycophenolate (GENERIC EQUIVALENT) 250 MG capsule Take 3 capsules (750 mg) by mouth every 12 hours 180 capsule 11     order for DME 1 Device by Device route daily Knee high compression socks 15-20 mmhg. 1 Device 0     oxyCODONE (ROXICODONE) 5 MG tablet Take 1 tablet (5 mg) by mouth every 4 hours as needed for moderate to severe pain 20 tablet 0     pantoprazole (PROTONIX) 40 MG EC tablet TAKE ONE TABLET BY MOUTH EVERY MORNING BEFORE BREAKFAST 90 tablet 3     polyethylene glycol (MIRALAX) 17 g packet Take 1 packet by mouth daily       predniSONE (DELTASONE) 5 MG tablet Take 1 tablet (5 mg) by mouth daily 90 tablet 3     rosuvastatin (CRESTOR) 5 MG tablet Take 1 tablet (5 mg) by mouth daily 90 tablet 3     senna-docusate (SENOKOT-S/PERICOLACE) 8.6-50 MG tablet Take 1 tablet by mouth 2 times daily as needed for constipation 50 tablet 0     tamsulosin (FLOMAX) 0.4 MG capsule Take 1 capsule (0.4 mg) by mouth daily 90 capsule 3     vitamin B-12 (CYANOCOBALAMIN) 1000 MCG tablet Take 1 tablet (1,000 mcg) by mouth daily 30 tablet 11     vitamin D3 (CHOLECALCIFEROL) 50 mcg (2000 units) tablet Take 1 tablet (50 mcg) by mouth daily 90 tablet 3       PAST SURGICAL HISTORY:  Past Surgical History:   Procedure Laterality Date     BYPASS GRAFT ARTERY CORONARY N/A 7/14/2021    Procedure: median sternotomy, on cardiopulmonary bypass, CORONARY ARTERY BYPASS GRAFT (CABG) x2 with left greater saphenous vein endoscopic harvest and left internal mammery artery harvest;  Surgeon: Tom Zapata MD;  Location: [facility] OR     COLONOSCOPY      2015     COLONOSCOPY N/A 12/6/2019    Procedure: COLONOSCOPY, WITH POLYPECTOMY AND BIOPSY;  Surgeon: Adam Morton MD;  Location: [facility] GI     CV CENTRAL VENOUS CATHETER PLACEMENT  N/A 7/12/2021    Procedure: Central Venous Catheter Placement;  Surgeon: Fermin Polanco MD;  Location:  [facility]     CV CORONARY ANGIOGRAM N/A 7/12/2021    Procedure: Coronary Angiogram;  Surgeon: Fermin Polanco MD;  Location:  [facility]     CV HEART CATHETERIZATION WITH POSSIBLE INTERVENTION N/A 2/26/2019    Procedure: CORS;  Surgeon: Jagdish Hoyt MD;  Location: [facility] CARDIAC CATH LAB     CV INTRA AORTIC BALLOON N/A 7/12/2021    Procedure: Intra Aortic Balloon Pump Insertion;  Surgeon: Fermin Polanco MD;  Location: [facility] CARDIAC CATH LAB     ESOPHAGOSCOPY, GASTROSCOPY, DUODENOSCOPY (EGD), COMBINED N/A 11/17/2016    Procedure: COMBINED ESOPHAGOSCOPY, GASTROSCOPY, DUODENOSCOPY (EGD);  Surgeon: Santi Rosas MD;  Location:  GI     ESOPHAGOSCOPY, GASTROSCOPY, DUODENOSCOPY (EGD), COMBINED N/A 11/17/2017    Procedure: COMBINED ESOPHAGOSCOPY, GASTROSCOPY, DUODENOSCOPY (EGD);  EGD;  Surgeon: Santi Rosas MD;  Location:  GI     ESOPHAGOSCOPY, GASTROSCOPY, DUODENOSCOPY (EGD), COMBINED N/A 12/28/2018    Procedure: EGD;  Surgeon: Santi Rosas MD;  Location:  OR     ESOPHAGOSCOPY, GASTROSCOPY, DUODENOSCOPY (EGD), COMBINED N/A 12/6/2019    Procedure: ESOPHAGOGASTRODUODENOSCOPY, WITH BIOPSY;  Surgeon: Adam Morton MD;  Location:  GI     ESOPHAGOSCOPY, GASTROSCOPY, DUODENOSCOPY (EGD), COMBINED N/A 2/13/2020    Procedure: ESOPHAGOGASTRODUODENOSCOPY (EGD);  Surgeon: Santi Rosas MD;  Location:  GI     HEAD & NECK SURGERY      12/2017 at [facility].      IMPLANT GOLD WEIGHT EYELID Right 11/16/2017    Procedure: IMPLANT WEIGHT EYELID;  Right Upper Eyelid Weight, right tarsal strip lower eyelid;  Surgeon: Milana Malave MD;  Location: [facility] OR     IR CHEMO EMBOLIZATION  1/22/2019     KNEE SURGERY Left      ORTHOPEDIC SURGERY       PAROTIDECTOMY, RADICAL NECK DISSECTION Right 11/2/2017     Procedure: PAROTIDECTOMY, RADICAL NECK DISSECTION;  Right Superfacial Parotidectomy , Facial nerve repair. with [facility] facial nerve monitor.;  Surgeon: Asiya Morgan MD;  Location: [facility] OR     PICC INSERTION Left 2017    4fr SL BioFlo PICC, 44cm in the L basilic vein w/ tip in the low SVC     RETURN LIVER TRANSPLANT N/A 2019    Procedure: Exploratory laparotomy, hematoma evacuation, abdominal washout;  Surgeon: Александр Ramos MD;  Location: [facility] OR     TRANSPLANT LIVER RECIPIENT  DONOR N/A 2019    Procedure: TRANSPLANT, LIVER, RECIPIENT,  DONOR;  Surgeon: Александр Ramos MD;  Location: [facility] OR     VASCULAR SURGERY         ALLERGIES:     Allergies   Allergen Reactions     Codeine Other (See Comments)     Cannot take due to liver  Cannot tolerate oral narcotics     Seasonal Allergies      Sneezing, coughing, runny and itchy eyes       FAMILY HISTORY:  Family History   Problem Relation Age of Onset     Prostate Cancer Maternal Grandfather      Substance Abuse Maternal Grandfather         Alcohol     Colon Cancer Father 60     Pancreatic Cancer Father 60     Prostate Cancer Father      Colorectal Cancer Father      Macular Degeneration Father      Cancer Father      Glaucoma Father      Skin Cancer Father      Colorectal Cancer Maternal Grandmother      Cancer Maternal Grandmother      Substance Abuse Maternal Grandmother         Alcohol     Colorectal Cancer Paternal Grandmother      Cancer Mother      Diabetes Mother          3/2016     Cerebrovascular Disease Mother         Passed away in Feb of this year, 80 years old.     Thyroid Disease Mother      Depression Mother      Asthma Sister         Had since birth     Thyroid Disease Sister      Depression Sister      Liver Disease No family hx of      Melanoma No family hx of      No family history of premature CAD or sudden death.    SOCIAL HISTORY:  Social History     Tobacco Use     Smoking status: Former Smoker      "Packs/day: 6.00     Years: 30.00     Pack years: 180.00     Types: Cigars     Start date: 5/1/2016     Quit date: 10/25/2017     Years since quitting: 3.7     Smokeless tobacco: Former User     Types: Chew     Quit date: 10/31/2017     Tobacco comment: 1 tin per week   Substance Use Topics     Alcohol use: No     Alcohol/week: 0.0 standard drinks     Comment: quit Sept. 1996     Drug use: No       ROS:   A comprehensive 14 point review of systems is negative other than as mentioned in HPI.    Exam:  /68 (BP Location: Left arm, Patient Position: Chair, Cuff Size: Adult Regular)   Pulse 98   Ht 1.753 m (5' 9\")   Wt 73.5 kg (162 lb)   SpO2 100%   BMI 23.92 kg/m      GENERAL APPEARANCE: healthy, alert and no distress  EYES: no icterus, no xanthelasmas  ENT: normal palate, mucosa moist, no central cyanosis  NECK: JVP not elevated  RESPIRATORY: lungs clear to auscultation - no rales, rhonchi or wheezes, no use of accessory muscles, no retractions, respirations are unlabored, normal respiratory rate  CARDIOVASCULAR: regular rhythm, normal S1 with physiologic split S2, no S3 or S4 and no murmur, click or rub.  GI: soft, non tender, bowel sounds normal,no abdominal bruits  EXTREMITIES: trace pitting edema in BLE  NEURO: alert and oriented to person/place/time, normal speech, gait and affect  VASC: Radial, dorsalis pedis and posterior tibialis pulses 2+ bilaterally.  SKIN: well-healed sternotomy site, incision clean/dry/intact.  PSYCH: cooperative, affect appropriate.     Labs:  Reviewed.       Testing/Procedures:    2/26/19 Coronary angiogram:  1. Severe liver disease undergoing transplant evaluation.  2. Moderate coronary artery disease of the ostial left main (40-50% stenosis) which does not appear to be flow-limiting based on minimal luminal area of 7.2mm2 by IVUS.  3. Radial artery sheath removed and hemostasis achieved with a TR band.    Coronary angiogram 7/12/2021  Severe distal Left main disease at " trifurcation of LCx, LAD, and Ramus.     Operative report 7/27/2021:  Coronary artery bypass grafting x 2.  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    1. CAD s/p CABG (LIMA to LAD and SVG to OM2) on 7/14/21 without angina  2. NSTEMI 7/2021  Patient is recovering well postoperatively.   - Continue Aspirin 81 mg daily and Rosuvastatin 5 mg daily.  - Continue Coreg 6.25 mg BID  - Stop Lisinopril, given low BP and JERONIMO.  - Follow up with CVTS as planned postoperatively.   - Referred to cardiac rehab    3. HTN, controlled: hold lisinopril as below. Continue carvedilol.    4. JERONIMO  - Avoid nephrotoxic agents. Stay hydrated.  - Will hold Lisinopril for now and recheck Bmp in 3-4 of weeks.      Plan discussed with Dr. Ireland  The patient states understanding and is agreeable with plan.     Sendy Moreno MD  Cardiology    ATTENDING ATTESTATION     Patient was seen and evaluated with Dr. Moreno. History was confirmed personally by me. Labs, imaging studies, EKGs, and telemetry were reviewed. Agree with assessment and plan as outlined above. The note has been edited by me as needed to produce a single, cohesive document.     Manjeet Ireland MD  Staff Cardiologist

## 2021-07-27 ENCOUNTER — OFFICE VISIT (OUTPATIENT)
Dept: CARDIOLOGY | Facility: CLINIC | Age: 56
End: 2021-07-27
Attending: INTERNAL MEDICINE
Payer: MEDICARE

## 2021-07-27 VITALS
HEIGHT: 69 IN | WEIGHT: 162 LBS | BODY MASS INDEX: 23.99 KG/M2 | SYSTOLIC BLOOD PRESSURE: 103 MMHG | DIASTOLIC BLOOD PRESSURE: 68 MMHG | OXYGEN SATURATION: 100 % | HEART RATE: 98 BPM

## 2021-07-27 DIAGNOSIS — Z95.1 S/P CABG (CORONARY ARTERY BYPASS GRAFT): ICD-10-CM

## 2021-07-27 DIAGNOSIS — I25.10 CORONARY ARTERY DISEASE INVOLVING NATIVE CORONARY ARTERY OF NATIVE HEART WITHOUT ANGINA PECTORIS: Primary | ICD-10-CM

## 2021-07-27 PROCEDURE — 99214 OFFICE O/P EST MOD 30 MIN: CPT | Mod: GC | Performed by: INTERNAL MEDICINE

## 2021-07-27 PROCEDURE — G0463 HOSPITAL OUTPT CLINIC VISIT: HCPCS | Mod: 25

## 2021-07-27 PROCEDURE — 93005 ELECTROCARDIOGRAM TRACING: CPT

## 2021-07-27 ASSESSMENT — ENCOUNTER SYMPTOMS
EXERCISE INTOLERANCE: 1
CHILLS: 0
ALTERED TEMPERATURE REGULATION: 0
LIGHT-HEADEDNESS: 1
SWOLLEN GLANDS: 0
ORTHOPNEA: 0
PALPITATIONS: 0
FEVER: 0
HYPOTENSION: 0
HALLUCINATIONS: 0
INCREASED ENERGY: 0
SLEEP DISTURBANCES DUE TO BREATHING: 0
HYPERTENSION: 0
LEG PAIN: 0
NIGHT SWEATS: 1
BRUISES/BLEEDS EASILY: 0
DECREASED APPETITE: 0
WEIGHT GAIN: 0
WEIGHT LOSS: 0
SYNCOPE: 0
FATIGUE: 0
POLYPHAGIA: 0
POLYDIPSIA: 0

## 2021-07-27 ASSESSMENT — MIFFLIN-ST. JEOR: SCORE: 1550.21

## 2021-07-27 ASSESSMENT — PAIN SCALES - GENERAL: PAINLEVEL: SEVERE PAIN (6)

## 2021-07-27 NOTE — LETTER
7/27/2021      RE: Frandy Workman  [address] [address] [facility] [address]       Dear Colleague,    Thank you for the opportunity to participate in the care of your patient, Frandy Workman, at the [facility] [place] [facility]. Please see a copy of my visit note below.    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    He reports that since May started to have palpitations. They would occur every night. They would wake him from sleep. Occasionally occurring during the day. No lightheadedness, dizziness, or syncope. Frequency has decreased, but still occurs once in awhile at night. Last time was in the middle of the night when an ambulance driving by. No SOB or ROONEY, he lives on 4th floor and can walk up all 4 flights w/o SOB or CP.    Clinic visit 7/27/2021:  Since our last visit, patient was admitted to the hospital from 7/13/21 to 7/22/21. Patient presented with NSTEMI. He was found to have severe distal LM disease at the trifurcation of LCx, LAD, and ramus. On 7/14/21, patient underwent CABG (LIMA to LAD and SVG to OM2). His hospital stay was uncomplicated and was discharged home on 7/22/21.    Patient states, he is recovering well from his surgery. He does have pain at the chest incision site with movement. No clicking or abnormal sound at the sternotomy site. He is able to ambulate without difficulty. He has no PND, orthopnea, or LE edema.       PAST MEDICAL HISTORY:  Past Medical History:   Diagnosis Date     Anemia 2013     Arthritis      BPH (benign prostatic hyperplasia)      CAD (coronary artery disease) 4/1/2019     Cholelithiasis      Conductive hearing loss 08/16/2017     Depressive disorder 1986    Suffer  effects throughout life     Gastroesophageal reflux disease 12/01/2014     HCC (hepatocellular carcinoma) (H) 1/22/2019     History of diabetic retinopathy 07/2018     HTN (hypertension)      HTN (hypertension) 11/20/2019     Hyperlipidemia      Liver cirrhosis secondary to ESTRADA (H)      Liver transplanted (H) 11/11/2019     Portal vein thrombosis 4/11/2019     Type II diabetes mellitus (H)        CURRENT MEDICATIONS:  Current Outpatient Medications   Medication Sig Dispense Refill     Acetaminophen (TYLENOL) 325 MG CAPS Take 325-650 mg by mouth every 4 hours as needed (pain, fever) 100 capsule 1     ARIPiprazole (ABILIFY) 5 MG tablet daily       Artificial Tear Solution (SM ARTIFICIAL TEARS) SOLN Place 1 drop into the right eye every hour as needed (dry eyes) Apply at least 4 times daily and as needed for dry eye 15 mL 1     aspirin (ASA) 81 MG EC tablet Take 2 tablets (162 mg) by mouth daily 60 tablet 0     BD VIKTORIA U/F 32G X 4 MM insulin pen needle Use 5 per day 300 each 3     carvedilol (COREG) 6.25 MG tablet Take 1 tablet (6.25 mg) by mouth 2 times daily (with meals) 60 tablet 0     ciclopirox (LOPROX) 0.77 % cream Apply topically 2 times daily To feet and toenails. 90 g 5     Continuous Blood Gluc  (FREESTYLE CLAUDIA 14 DAY READER) CECILIO Use to read blood sugars as per 's instructions. 1 each 0     Continuous Blood Gluc Sensor (FREESTYLE CLAUDIA 14 DAY SENSOR) MISC Change every 14 days. 9 each 3     insulin aspart (NOVOLOG PEN) 100 UNIT/ML pen Inject 1-5 Units Subcutaneous At Bedtime MEDIUM INSULIN RESISTANCE DOSING    Do Not give Bedtime Correction Insulin if BG less than  200.   For  - 249 give 1 units.   For  - 299 give 2 units.   For  - 349 give 3 units.   For  -399 give 4 units.   For BG greater than or equal to 400 give 5 units.  Notify provider if glucose greater than or equal to 350 mg/dL after administration of correction dose. If given at mealtime, administer  within 30 minutes of start of meal 3 mL 0     insulin aspart (NOVOLOG PEN) 100 UNIT/ML pen Inject 1-15 Units Subcutaneous 3 times daily (with meals) 20 mL 3     lamiVUDine (EPIVIR) 100 MG tablet Take 1 tablet (100 mg) by mouth daily 90 tablet 3     lisinopril (ZESTRIL) 2.5 MG tablet Take 1 tablet (2.5 mg) by mouth daily 60 tablet 0     methocarbamol (ROBAXIN) 750 MG tablet Take 1 tablet (750 mg) by mouth 3 times daily as needed for muscle spasms (sternal pain) 60 tablet 0     Multiple Vitamin (THERAVITE PO) Take 1 tablet by mouth every morning        mycophenolate (GENERIC EQUIVALENT) 250 MG capsule Take 3 capsules (750 mg) by mouth every 12 hours 180 capsule 11     order for DME 1 Device by Device route daily Knee high compression socks 15-20 mmhg. 1 Device 0     oxyCODONE (ROXICODONE) 5 MG tablet Take 1 tablet (5 mg) by mouth every 4 hours as needed for moderate to severe pain 20 tablet 0     pantoprazole (PROTONIX) 40 MG EC tablet TAKE ONE TABLET BY MOUTH EVERY MORNING BEFORE BREAKFAST 90 tablet 3     polyethylene glycol (MIRALAX) 17 g packet Take 1 packet by mouth daily       predniSONE (DELTASONE) 5 MG tablet Take 1 tablet (5 mg) by mouth daily 90 tablet 3     rosuvastatin (CRESTOR) 5 MG tablet Take 1 tablet (5 mg) by mouth daily 90 tablet 3     senna-docusate (SENOKOT-S/PERICOLACE) 8.6-50 MG tablet Take 1 tablet by mouth 2 times daily as needed for constipation 50 tablet 0     tamsulosin (FLOMAX) 0.4 MG capsule Take 1 capsule (0.4 mg) by mouth daily 90 capsule 3     vitamin B-12 (CYANOCOBALAMIN) 1000 MCG tablet Take 1 tablet (1,000 mcg) by mouth daily 30 tablet 11     vitamin D3 (CHOLECALCIFEROL) 50 mcg (2000 units) tablet Take 1 tablet (50 mcg) by mouth daily 90 tablet 3       PAST SURGICAL HISTORY:  Past Surgical History:   Procedure Laterality Date     BYPASS GRAFT ARTERY CORONARY N/A 7/14/2021    Procedure: median sternotomy, on cardiopulmonary bypass, CORONARY ARTERY BYPASS GRAFT (CABG) x2 with left  greater saphenous vein endoscopic harvest and left internal mammery artery harvest;  Surgeon: Tom Zapata MD;  Location: [facility] OR     COLONOSCOPY      2015     COLONOSCOPY N/A 12/6/2019    Procedure: COLONOSCOPY, WITH POLYPECTOMY AND BIOPSY;  Surgeon: Adam Morton MD;  Location: [facility] GI     CV CENTRAL VENOUS CATHETER PLACEMENT N/A 7/12/2021    Procedure: Central Venous Catheter Placement;  Surgeon: Fermin Polanco MD;  Location: [facility]     CV CORONARY ANGIOGRAM N/A 7/12/2021    Procedure: Coronary Angiogram;  Surgeon: Fermin Polanco MD;  Location: [facility]     CV HEART CATHETERIZATION WITH POSSIBLE INTERVENTION N/A 2/26/2019    Procedure: CORS;  Surgeon: Jagdish Hoyt MD;  Location: [facility]     CV INTRA AORTIC BALLOON N/A 7/12/2021    Procedure: Intra Aortic Balloon Pump Insertion;  Surgeon: Fermin Polanco MD;  Location: [facility]     ESOPHAGOSCOPY, GASTROSCOPY, DUODENOSCOPY (EGD), COMBINED N/A 11/17/2016    Procedure: COMBINED ESOPHAGOSCOPY, GASTROSCOPY, DUODENOSCOPY (EGD);  Surgeon: Santi Rosas MD;  Location: [facility] GI     ESOPHAGOSCOPY, GASTROSCOPY, DUODENOSCOPY (EGD), COMBINED N/A 11/17/2017    Procedure: COMBINED ESOPHAGOSCOPY, GASTROSCOPY, DUODENOSCOPY (EGD);  EGD;  Surgeon: Santi Rosas MD;  Location: [facility] GI     ESOPHAGOSCOPY, GASTROSCOPY, DUODENOSCOPY (EGD), COMBINED N/A 12/28/2018    Procedure: EGD;  Surgeon: Santi Rosas MD;  [facility]     ESOPHAGOSCOPY, GASTROSCOPY, DUODENOSCOPY (EGD), COMBINED N/A 12/6/2019    Procedure: ESOPHAGOGASTRODUODENOSCOPY, WITH BIOPSY;  Surgeon: Adam Morton MD;  Location: [facility]     ESOPHAGOSCOPY, GASTROSCOPY, DUODENOSCOPY (EGD), COMBINED N/A 2/13/2020    Procedure: ESOPHAGOGASTRODUODENOSCOPY (EGD);  Surgeon: Santi Rosas MD;  Location:  GI     HEAD & NECK SURGERY      12/2017 at [facility].       IMPLANT GOLD WEIGHT EYELID Right 2017    Procedure: IMPLANT WEIGHT EYELID;  Right Upper Eyelid Weight, right tarsal strip lower eyelid;  Surgeon: Milana Malave MD;  Location: [facility] OR     IR CHEMO EMBOLIZATION  2019     KNEE SURGERY Left      ORTHOPEDIC SURGERY       PAROTIDECTOMY, RADICAL NECK DISSECTION Right 2017    Procedure: PAROTIDECTOMY, RADICAL NECK DISSECTION;  Right Superfacial Parotidectomy , Facial nerve repair. with [facility] facial nerve monitor.;  Surgeon: Asiya Morgan MD;  Location: [facility] OR     PICC INSERTION Left 2017    4fr SL BioFlo PICC, 44cm in the L basilic vein w/ tip in the low SVC     RETURN LIVER TRANSPLANT N/A 2019    Procedure: Exploratory laparotomy, hematoma evacuation, abdominal washout;  Surgeon: Александр Ramos MD;  Location: [facility] OR     TRANSPLANT LIVER RECIPIENT  DONOR N/A 2019    Procedure: TRANSPLANT, LIVER, RECIPIENT,  DONOR;  Surgeon: Александр Ramos MD;  Location: [facility] OR     VASCULAR SURGERY         ALLERGIES:     Allergies   Allergen Reactions     Codeine Other (See Comments)     Cannot take due to liver  Cannot tolerate oral narcotics     Seasonal Allergies      Sneezing, coughing, runny and itchy eyes       FAMILY HISTORY:  Family History   Problem Relation Age of Onset     Prostate Cancer Maternal Grandfather      Substance Abuse Maternal Grandfather         Alcohol     Colon Cancer Father 60     Pancreatic Cancer Father 60     Prostate Cancer Father      Colorectal Cancer Father      Macular Degeneration Father      Cancer Father      Glaucoma Father      Skin Cancer Father      Colorectal Cancer Maternal Grandmother      Cancer Maternal Grandmother      Substance Abuse Maternal Grandmother         Alcohol     Colorectal Cancer Paternal Grandmother      Cancer Mother      Diabetes Mother          3/2016     Cerebrovascular Disease Mother         Passed away in Feb of this year, 80 years old.     Thyroid Disease  "Mother      Depression Mother      Asthma Sister         Had since birth     Thyroid Disease Sister      Depression Sister      Liver Disease No family hx of      Melanoma No family hx of      No family history of premature CAD or sudden death.    SOCIAL HISTORY:  Social History     Tobacco Use     Smoking status: Former Smoker     Packs/day: 6.00     Years: 30.00     Pack years: 180.00     Types: Cigars     Start date: 5/1/2016     Quit date: 10/25/2017     Years since quitting: 3.7     Smokeless tobacco: Former User     Types: Chew     Quit date: 10/31/2017     Tobacco comment: 1 tin per week   Substance Use Topics     Alcohol use: No     Alcohol/week: 0.0 standard drinks     Comment: quit Sept. 1996     Drug use: No       ROS:   A comprehensive 14 point review of systems is negative other than as mentioned in HPI.    Exam:  /68 (BP Location: Left arm, Patient Position: Chair, Cuff Size: Adult Regular)   Pulse 98   Ht 1.753 m (5' 9\")   Wt 73.5 kg (162 lb)   SpO2 100%   BMI 23.92 kg/m      GENERAL APPEARANCE: healthy, alert and no distress  EYES: no icterus, no xanthelasmas  ENT: normal palate, mucosa moist, no central cyanosis  NECK: JVP not elevated  RESPIRATORY: lungs clear to auscultation - no rales, rhonchi or wheezes, no use of accessory muscles, no retractions, respirations are unlabored, normal respiratory rate  CARDIOVASCULAR: regular rhythm, normal S1 with physiologic split S2, no S3 or S4 and no murmur, click or rub.  GI: soft, non tender, bowel sounds normal,no abdominal bruits  EXTREMITIES: trace pitting edema in BLE  NEURO: alert and oriented to person/place/time, normal speech, gait and affect  VASC: Radial, dorsalis pedis and posterior tibialis pulses 2+ bilaterally.  SKIN: well-healed sternotomy site, incision clean/dry/intact.  PSYCH: cooperative, affect appropriate.     Labs:  Reviewed.       Testing/Procedures:    2/26/19 Coronary angiogram:  1. Severe liver disease undergoing " transplant evaluation.  2. Moderate coronary artery disease of the ostial left main (40-50% stenosis) which does not appear to be flow-limiting based on minimal luminal area of 7.2mm2 by IVUS.  3. Radial artery sheath removed and hemostasis achieved with a TR band.    Coronary angiogram 7/12/2021  Severe distal Left main disease at trifurcation of LCx, LAD, and Ramus.     Operative report 7/27/2021:  Coronary artery bypass grafting x 2.  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    1. CAD s/p CABG (LIMA to LAD and SVG to OM2) on 7/14/21 without angina  2. NSTEMI 7/2021  Patient is recovering well postoperatively.   - Continue Aspirin 81 mg daily and Rosuvastatin 5 mg daily.  - Continue Coreg 6.25 mg BID  - Stop Lisinopril, given low BP and JERONIMO.  - Follow up with CVTS as planned postoperatively.   - Referred to cardiac rehab    3. HTN, controlled: hold lisinopril as below. Continue carvedilol.    4. JERONIMO  - Avoid nephrotoxic agents. Stay hydrated.  - Will hold Lisinopril for now and recheck Bmp in 3-4 of weeks.      Plan discussed with Dr. Ireland  The patient states understanding and is agreeable with plan.     Sendy Moreno MD  Cardiology    ATTENDING ATTESTATION     Patient was seen and evaluated with Dr. Moreno. History was confirmed personally by me. Labs, imaging studies, EKGs, and telemetry were reviewed. Agree with assessment and plan as outlined above. The note has been edited by me as needed to produce a single, cohesive document.     Manjeet HEATH  MD Shu  Staff Cardiologist

## 2021-07-27 NOTE — PATIENT INSTRUCTIONS
It was a pleasure to see you in the cardiology clinic today.      If you have any questions, call  Albertina Soares at [phone].  Press Option #1 for the [facility], and then press Option #4  We are encouraging the use of [employer] to communicate with your HealthCare Provider    Note the new or changes to your medications:   Stop the following medications: stop lisinopril    Please follow up with Dr. Ireland in 6 months with labs and echo  Labs: please check labs again 2 weeks    If you have an urgent need after hours (8:00 am to 4:30 pm) please call [phone] and ask for the cardiology fellow on call.

## 2021-07-27 NOTE — NURSING NOTE
Chief Complaint   Patient presents with     Follow Up     Annual      Vitals were taken and medications where reconciled. EKG was performed   JOSR Singh  4:43 PM

## 2021-07-27 NOTE — NURSING NOTE
Med Reconcile: Reviewed and verified all current medications with the patient. The updated medication list was printed and given to the patient.     Med changes: stop lisinopril    Return Appointment: Patient given instructions regarding scheduling next clinic visit: Labs 2 weeks, Shu in 6 months with echo and labs    Patient stated he understood all health information given and agreed to call with further questions or concerns.     Albertina Soares RN

## 2021-07-28 LAB
ATRIAL RATE - MUSE: 95 BPM
DIASTOLIC BLOOD PRESSURE - MUSE: NORMAL MMHG
INTERPRETATION ECG - MUSE: NORMAL
P AXIS - MUSE: 0 DEGREES
PR INTERVAL - MUSE: 116 MS
QRS DURATION - MUSE: 68 MS
QT - MUSE: 318 MS
QTC - MUSE: 399 MS
R AXIS - MUSE: 19 DEGREES
SYSTOLIC BLOOD PRESSURE - MUSE: NORMAL MMHG
T AXIS - MUSE: 117 DEGREES
VENTRICULAR RATE- MUSE: 95 BPM

## 2021-07-29 ENCOUNTER — TELEPHONE (OUTPATIENT)
Dept: INTERNAL MEDICINE | Facility: CLINIC | Age: 56
End: 2021-07-29

## 2021-07-29 NOTE — TELEPHONE ENCOUNTER
[facility] Call Center    Phone Message    May a detailed message be left on voicemail: yes     Reason for Call: Order(s): Home Care Orders: Skilled Nursing: Two times a week for one week. One time a week for two weeks.    Action Taken: Message routed to:  [facility]    Travel Screening: Not Applicable

## 2021-07-30 ENCOUNTER — OFFICE VISIT (OUTPATIENT)
Dept: CARDIOLOGY | Facility: CLINIC | Age: 56
End: 2021-07-30
Attending: THORACIC SURGERY (CARDIOTHORACIC VASCULAR SURGERY)
Payer: MEDICARE

## 2021-07-30 ENCOUNTER — TELEPHONE (OUTPATIENT)
Dept: INTERNAL MEDICINE | Facility: CLINIC | Age: 56
End: 2021-07-30

## 2021-07-30 VITALS
WEIGHT: 155.6 LBS | DIASTOLIC BLOOD PRESSURE: 56 MMHG | SYSTOLIC BLOOD PRESSURE: 83 MMHG | HEIGHT: 69 IN | HEART RATE: 93 BPM | OXYGEN SATURATION: 99 % | BODY MASS INDEX: 23.05 KG/M2

## 2021-07-30 DIAGNOSIS — Z95.1 S/P CABG (CORONARY ARTERY BYPASS GRAFT): Primary | ICD-10-CM

## 2021-07-30 PROCEDURE — 99024 POSTOP FOLLOW-UP VISIT: CPT | Performed by: PHYSICIAN ASSISTANT

## 2021-07-30 PROCEDURE — G0463 HOSPITAL OUTPT CLINIC VISIT: HCPCS

## 2021-07-30 RX ORDER — OXYCODONE HYDROCHLORIDE 5 MG/1
5 TABLET ORAL EVERY 8 HOURS PRN
Qty: 25 TABLET | Refills: 0 | Status: SHIPPED | OUTPATIENT
Start: 2021-07-30 | End: 2021-08-07

## 2021-07-30 RX ORDER — METOPROLOL SUCCINATE 25 MG/1
12.5 TABLET, EXTENDED RELEASE ORAL DAILY
Qty: 60 TABLET | Refills: 3 | Status: SHIPPED | OUTPATIENT
Start: 2021-07-30 | End: 2022-10-24

## 2021-07-30 ASSESSMENT — MIFFLIN-ST. JEOR: SCORE: 1521.18

## 2021-07-30 ASSESSMENT — PAIN SCALES - GENERAL: PAINLEVEL: SEVERE PAIN (6)

## 2021-07-30 NOTE — TELEPHONE ENCOUNTER
I called and left verbal approval for orders requested below.   Xiao Hernández, EMT at 4:17 PM on 7/30/2021.  [facility]  [facility]  [place]  [phone]

## 2021-07-30 NOTE — PATIENT INSTRUCTIONS
Pain control: start taking Tylenol 1000 mg 3 x daily for your pain. In addition take the muscle relaxer (Robaxin) at night and also during the day if you aren't going to be out doing a lot of activity (it may make you tired). Start weaning off of the oxycodone. We did send a one time refill of the oxycodone for you today.

## 2021-07-30 NOTE — TELEPHONE ENCOUNTER
[facility] Call Center    Phone Message    May a detailed message be left on voicemail: yes     Reason for Call: Order(s): Home Care Orders: Occupational Therapy (OT): Verbal OT to treat 2 times per week for 2 weeks for energy conservation and home safety.     Action Taken: Message routed to:  [facility]    Travel Screening: Not Applicable

## 2021-07-30 NOTE — NURSING NOTE
Chief Complaint   Patient presents with     Follow Up     Post Op      Vitals were taken and medications where reconciled.   Robson Lo, EMT  2:25 PM

## 2021-07-30 NOTE — TELEPHONE ENCOUNTER
Verbal orders given to Grace from [facility], per Dr. Moe, for Order(s): Home Care Orders: Skilled Nursing: Two times a week for one week. One time a week for two weeks. Rosalinda Aguilera LPN 7/30/2021 10:32 AM

## 2021-07-30 NOTE — LETTER
7/30/2021      RE: Frandy Workman  [address] [address]       Dear Colleague,    Thank you for the opportunity to participate in the care of your patient, Frandy Workman, at the [facility]. Please see a copy of my visit note below.    CARDIOTHORACIC SURGERY FOLLOW-UP VISIT     Frandy Workman   1965   [id number]      Reason for visit: Post-Op CABG x2 (LIMA to LAD, SVG to OM) with Dr. Zapata on /2021    HPI:   Frandy Workman is a 56 year old male with history of HTN, HLD, DM2, chronic anemia, stage 3 CKD, HCC and ESTRADA cirrhosis s/p liver transplant 11/2019, HIV infection, recurrent gastric ulcers, MDD and bipolar I who was admitted to [facility] 7/13/21 with 3-4 days of unstable angina and found to have had NSTEMI with critical LM<CA stenosis. He had IABP placed and was started on heparin infusion. He underwent CABG x2 on 7/14/21 with Dr. Tom Zapata. He had a relatively unremarkable postop course and was discharged to home on 07/22/2021.       Patient reports overall doing well since discharge. His biggest issue right now is ongoing pain control. He states his chest is still very sore with movement and anytime he moves his arms. He states he is adherent to sternal precautions. He denies popping or clicking in his chest. Reports incisions are well healing, no erythema tenderness, or drainage. Denies fevers or chills. No chest pain, SOB, LE edema, orthopnea, or PND. His BP is quite low today, 83/56, but he is completely asymptomatic. No palpitations, dizziness, or lightheadedness. Reports blood sugars have been well controlled.     PAST MEDICAL HISTORY:  Past Medical History:   Diagnosis Date     Anemia 2013     Arthritis      BPH (benign prostatic hyperplasia)      CAD (coronary artery disease) 4/1/2019     Cholelithiasis      Conductive hearing loss 08/16/2017     Depressive disorder 1986    Suffer  effects throughout life     Gastroesophageal reflux disease 12/01/2014     HCC (hepatocellular carcinoma) (H) 1/22/2019     History of diabetic retinopathy 07/2018     HTN (hypertension)      HTN (hypertension) 11/20/2019     Hyperlipidemia      Liver cirrhosis secondary to ESTRADA (H)      Liver transplanted (H) 11/11/2019     Portal vein thrombosis 4/11/2019     Type II diabetes mellitus (H)        PAST SURGICAL HISTORY:  Past Surgical History:   Procedure Laterality Date     BYPASS GRAFT ARTERY CORONARY N/A 7/14/2021    Procedure: median sternotomy, on cardiopulmonary bypass, CORONARY ARTERY BYPASS GRAFT (CABG) x2 with left greater saphenous vein endoscopic harvest and left internal mammery artery harvest;  Surgeon: Tom Zapata MD;  [facility]     COLONOSCOPY      2015     COLONOSCOPY N/A 12/6/2019    Procedure: COLONOSCOPY, WITH POLYPECTOMY AND BIOPSY;  Surgeon: Adam Morton MD;  [facility]     CV CENTRAL VENOUS CATHETER PLACEMENT N/A 7/12/2021    Procedure: Central Venous Catheter Placement;  Surgeon: Fermin Polanco MD;  Location:  HEART CARDIAC CATH LAB     CV CORONARY ANGIOGRAM N/A 7/12/2021    Procedure: Coronary Angiogram;  Surgeon: Fermin Polanco MD;  Location:  HEART CARDIAC CATH LAB     CV HEART CATHETERIZATION WITH POSSIBLE INTERVENTION N/A 2/26/2019    Procedure: CORS;  Surgeon: Jagdish Hoyt MD;  Location:  HEART CARDIAC CATH LAB     CV INTRA AORTIC BALLOON N/A 7/12/2021    Procedure: Intra Aortic Balloon Pump Insertion;  Surgeon: Fermin Polanco MD;  Location:  HEART CARDIAC CATH LAB     ESOPHAGOSCOPY, GASTROSCOPY, DUODENOSCOPY (EGD), COMBINED N/A 11/17/2016    Procedure: COMBINED ESOPHAGOSCOPY, GASTROSCOPY, DUODENOSCOPY (EGD);  Surgeon: Santi Rosas MD;  Location:  GI     ESOPHAGOSCOPY, GASTROSCOPY, DUODENOSCOPY (EGD), COMBINED N/A 11/17/2017    Procedure: COMBINED ESOPHAGOSCOPY, GASTROSCOPY,  DUODENOSCOPY (EGD);  EGD;  Surgeon: Santi Rosas MD;  Location:  GI     ESOPHAGOSCOPY, GASTROSCOPY, DUODENOSCOPY (EGD), COMBINED N/A 2018    Procedure: EGD;  Surgeon: Santi Rosas MD;  [facility]     ESOPHAGOSCOPY, GASTROSCOPY, DUODENOSCOPY (EGD), COMBINED N/A 2019    Procedure: ESOPHAGOGASTRODUODENOSCOPY, WITH BIOPSY;  Surgeon: Adam Morton MD;  Location:  GI     ESOPHAGOSCOPY, GASTROSCOPY, DUODENOSCOPY (EGD), COMBINED N/A 2020    Procedure: ESOPHAGOGASTRODUODENOSCOPY (EGD);  Surgeon: Santi Rosas MD;  [facility]     HEAD & NECK SURGERY      2017 at [facility].      IMPLANT GOLD WEIGHT EYELID Right 2017    Procedure: IMPLANT WEIGHT EYELID;  Right Upper Eyelid Weight, right tarsal strip lower eyelid;  Surgeon: Milana Malave MD;  [facility]     IR CHEMO EMBOLIZATION  2019     KNEE SURGERY Left      ORTHOPEDIC SURGERY       PAROTIDECTOMY, RADICAL NECK DISSECTION Right 2017    Procedure: PAROTIDECTOMY, RADICAL NECK DISSECTION;  Right Superfacial Parotidectomy , Facial nerve repair. with [facility] facial nerve monitor.;  Surgeon: Asiya Morgan MD;  [facility]     PICC INSERTION Left 2017    4fr SL BioFlo PICC, 44cm in the L basilic vein w/ tip in the low SVC     RETURN LIVER TRANSPLANT N/A 2019    Procedure: Exploratory laparotomy, hematoma evacuation, abdominal washout;  Surgeon: Александр Ramos MD;  [facility]     TRANSPLANT LIVER RECIPIENT  DONOR N/A 2019    Procedure: TRANSPLANT, LIVER, RECIPIENT,  DONOR;  Surgeon: Александр Ramos MD;  [facility] OR     VASCULAR SURGERY         CURRENT MEDICATIONS:   Current Outpatient Medications   Medication     Acetaminophen (TYLENOL) 325 MG CAPS     ARIPiprazole (ABILIFY) 5 MG tablet     aspirin (ASA) 81 MG EC tablet     BD VIKTORIA U/F 32G X 4 MM insulin pen needle     ciclopirox (LOPROX) 0.77 % cream     Continuous Blood Gluc   (FREESTYLE CLAUDIA 14 DAY READER) CECILIO     Continuous Blood Gluc Sensor (FREESTYLE CLAUDIA 14 DAY SENSOR) MISC     insulin aspart (NOVOLOG PEN) 100 UNIT/ML pen     insulin aspart (NOVOLOG PEN) 100 UNIT/ML pen     lamiVUDine (EPIVIR) 100 MG tablet     metoprolol succinate ER (TOPROL-XL) 25 MG 24 hr tablet     Multiple Vitamin (THERAVITE PO)     mycophenolate (GENERIC EQUIVALENT) 250 MG capsule     order for DME     oxyCODONE (ROXICODONE) 5 MG tablet     oxyCODONE (ROXICODONE) 5 MG tablet     pantoprazole (PROTONIX) 40 MG EC tablet     polyethylene glycol (MIRALAX) 17 g packet     predniSONE (DELTASONE) 5 MG tablet     rosuvastatin (CRESTOR) 5 MG tablet     tamsulosin (FLOMAX) 0.4 MG capsule     vitamin B-12 (CYANOCOBALAMIN) 1000 MCG tablet     vitamin D3 (CHOLECALCIFEROL) 50 mcg (2000 units) tablet     Artificial Tear Solution (SM ARTIFICIAL TEARS) SOLN     methocarbamol (ROBAXIN) 750 MG tablet     senna-docusate (SENOKOT-S/PERICOLACE) 8.6-50 MG tablet     Current Facility-Administered Medications   Medication     aflibercept (EYLEA) injection prefilled syringe 2 mg     aflibercept (EYLEA) injection prefilled syringe 2 mg       ALLERGIES:      Allergies   Allergen Reactions     Codeine Other (See Comments)     Cannot take due to liver  Cannot tolerate oral narcotics     Seasonal Allergies      Sneezing, coughing, runny and itchy eyes         ROS:  Review of symptoms otherwise negative unless commented about in HPI.     LABS:  Last Basic Metabolic Panel:  Lab Results   Component Value Date     07/26/2021     06/28/2021      Lab Results   Component Value Date    POTASSIUM 5.0 07/26/2021    POTASSIUM 4.6 06/28/2021     Lab Results   Component Value Date    CHLORIDE 106 07/26/2021    CHLORIDE 107 06/28/2021     Lab Results   Component Value Date    DEBORAH 9.8 07/26/2021    DEBORAH 9.1 06/28/2021     Lab Results   Component Value Date    CO2 20 07/26/2021    CO2 25 06/28/2021     Lab Results   Component Value Date    BUN  "30 07/26/2021    BUN 33 06/28/2021     Lab Results   Component Value Date    CR 1.31 07/26/2021    CR 1.62 06/28/2021     Lab Results   Component Value Date     07/26/2021     06/28/2021       Last CBC:   Lab Results   Component Value Date    WBC 8.8 07/26/2021    WBC 4.4 06/28/2021     Lab Results   Component Value Date    RBC 3.07 07/26/2021    RBC 2.35 06/28/2021     Lab Results   Component Value Date    HGB 8.8 07/26/2021    HGB 7.3 06/28/2021     Lab Results   Component Value Date    HCT 29.1 07/26/2021    HCT 22.6 06/28/2021     No components found for: MCT  Lab Results   Component Value Date    MCV 95 07/26/2021    MCV 96 06/28/2021     Lab Results   Component Value Date    MCH 28.7 07/26/2021    MCH 31.1 06/28/2021     Lab Results   Component Value Date    MCHC 30.2 07/26/2021    MCHC 32.3 06/28/2021     Lab Results   Component Value Date    RDW 18.2 07/26/2021    RDW 15.1 06/28/2021     Lab Results   Component Value Date     07/26/2021     06/28/2021       INR:  Lab Results   Component Value Date    INR 1.28 07/14/2021    INR 1.45 07/14/2021    INR 0.96 07/12/2021    INR 1.09 01/24/2020    INR 1.14 12/05/2019    INR 1.11 11/16/2019    INR 1.12 11/15/2019    INR 1.19 11/14/2019    INR 1.24 11/14/2019    INR 1.27 11/13/2019    INR 1.26 11/13/2019    INR 1.26 11/13/2019    INR 1.28 11/13/2019    INR 1.32 11/12/2019    INR 1.38 11/12/2019         IMAGING: None    PHYSICAL EXAM:   BP (!) 83/56 (BP Location: Left arm, Patient Position: Chair, Cuff Size: Adult Regular)   Pulse 93   Ht 1.753 m (5' 9\")   Wt 70.6 kg (155 lb 9.6 oz)   SpO2 99%   BMI 22.98 kg/m    General: alert and oriented x 3, pleasant, no acute distress, normal mood and affect  Neuro: no focal deficits   CV: S1 S2, no murmurs, rubs or gallops, regular rate and rhythm, no peripheral edema  Pulm: bilateral breath sounds, clear to auscultation, easy work of breathing  Incision: incisions clean dry and intact without " erythema, swelling or drainage. Sternum stable    PROCEDURES: None       ASSESSMENT/PLAN:    Mr. Workman is post-op CABG x2 (LIMA to LAD, SVG to OM) with Dr. Zapata on /2021    1. Surgically doing well overall.  Incisions are healing well with no signs of infection. Increasing activity and strength overall. We reviewed pain control strategies; advised scheduled Tylenol. Has not been taking Robaxin, so recommended that as well. Sent one time refill for oxycodone and he is understanding about weaning this off.   2. BP is quite low 80s/50s on both arms, checked several times, but is asymptomatic. Recently seen by cardiology and had lisinopril stopped; he confirmed today that he was no longer taking. Will have him stop Coreg and switch to Toprol 12.5 mg daily.   3. [facility] [place] [facility]  4. Will send referral to out-patient cardiac rehab  5. Continue sternal precautions for 12 weeks from surgery date. Reviewed with patient today  6. No driving for 4 weeks from surgery date.       The total time spent with the patient was 25 minutes, > 50% of which was spent in counseling.    CC  Nerissa Hill PA-C  Cardiothoracic Surgery  Pager [phone]      Please do not hesitate to contact me if you have any questions/concerns.     Sincerely,     Cardiovascular Thoracic Surgery

## 2021-07-30 NOTE — PROGRESS NOTES
CARDIOTHORACIC SURGERY FOLLOW-UP VISIT     Frandy Workman   1965   [id number]      Reason for visit: Post-Op CABG x2 (LIMA to LAD, SVG to OM) with Dr. Zapata on /2021    HPI:   Frandy Workman is a 56 year old male with history of HTN, HLD, DM2, chronic anemia, stage 3 CKD, HCC and ESTRADA cirrhosis s/p liver transplant 11/2019, HIV infection, recurrent gastric ulcers, MDD and bipolar I who was admitted to [facility] 7/13/21 with 3-4 days of unstable angina and found to have had NSTEMI with critical LM<CA stenosis. He had IABP placed and was started on heparin infusion. He underwent CABG x2 on 7/14/21 with Dr. Tom Zapata. He had a relatively unremarkable postop course and was discharged to home on 07/22/2021.       Patient reports overall doing well since discharge. His biggest issue right now is ongoing pain control. He states his chest is still very sore with movement and anytime he moves his arms. He states he is adherent to sternal precautions. He denies popping or clicking in his chest. Reports incisions are well healing, no erythema tenderness, or drainage. Denies fevers or chills. No chest pain, SOB, LE edema, orthopnea, or PND. His BP is quite low today, 83/56, but he is completely asymptomatic. No palpitations, dizziness, or lightheadedness. Reports blood sugars have been well controlled.     PAST MEDICAL HISTORY:  Past Medical History:   Diagnosis Date     Anemia 2013     Arthritis      BPH (benign prostatic hyperplasia)      CAD (coronary artery disease) 4/1/2019     Cholelithiasis      Conductive hearing loss 08/16/2017     Depressive disorder 1986    Suffer effects throughout life     Gastroesophageal reflux disease 12/01/2014     HCC (hepatocellular carcinoma) (H) 1/22/2019     History of diabetic retinopathy 07/2018     HTN (hypertension)      HTN (hypertension) 11/20/2019     Hyperlipidemia      Liver cirrhosis secondary to ESTRADA (H)      Liver transplanted (H) 11/11/2019     Portal vein thrombosis  4/11/2019     Type II diabetes mellitus (H)        PAST SURGICAL HISTORY:  Past Surgical History:   Procedure Laterality Date     BYPASS GRAFT ARTERY CORONARY N/A 7/14/2021    Procedure: median sternotomy, on cardiopulmonary bypass, CORONARY ARTERY BYPASS GRAFT (CABG) x2 with left greater saphenous vein endoscopic harvest and left internal mammery artery harvest;  Surgeon: Tom Zapata MD;  [facility]     COLONOSCOPY      2015     COLONOSCOPY N/A 12/6/2019    Procedure: COLONOSCOPY, WITH POLYPECTOMY AND BIOPSY;  Surgeon: Adam Morton MD;  Location:  GI     CV CENTRAL VENOUS CATHETER PLACEMENT N/A 7/12/2021    Procedure: Central Venous Catheter Placement;  Surgeon: Fermin Polanco MD;  Location:  HEART CARDIAC CATH LAB     CV CORONARY ANGIOGRAM N/A 7/12/2021    Procedure: Coronary Angiogram;  Surgeon: Fermin Polanco MD;  Location:  HEART CARDIAC CATH LAB     CV HEART CATHETERIZATION WITH POSSIBLE INTERVENTION N/A 2/26/2019    Procedure: CORS;  Surgeon: Jagdish Hoyt MD;  Location:  HEART CARDIAC CATH LAB     CV INTRA AORTIC BALLOON N/A 7/12/2021    Procedure: Intra Aortic Balloon Pump Insertion;  Surgeon: Fermin Polanco MD;  Location: [facility] CARDIAC CATH LAB     ESOPHAGOSCOPY, GASTROSCOPY, DUODENOSCOPY (EGD), COMBINED N/A 11/17/2016    Procedure: COMBINED ESOPHAGOSCOPY, GASTROSCOPY, DUODENOSCOPY (EGD);  Surgeon: Santi Rosas MD;  Location:  GI     ESOPHAGOSCOPY, GASTROSCOPY, DUODENOSCOPY (EGD), COMBINED N/A 11/17/2017    Procedure: COMBINED ESOPHAGOSCOPY, GASTROSCOPY, DUODENOSCOPY (EGD);  EGD;  Surgeon: Santi Rosas MD;  Location:  GI     ESOPHAGOSCOPY, GASTROSCOPY, DUODENOSCOPY (EGD), COMBINED N/A 12/28/2018    Procedure: EGD;  Surgeon: Santi Rosas MD;  Location:  OR     ESOPHAGOSCOPY, GASTROSCOPY, DUODENOSCOPY (EGD), COMBINED N/A 12/6/2019    Procedure: ESOPHAGOGASTRODUODENOSCOPY, WITH  BIOPSY;  Surgeon: Adam Morton MD;  [facility]     ESOPHAGOSCOPY, GASTROSCOPY, DUODENOSCOPY (EGD), COMBINED N/A 2020    Procedure: ESOPHAGOGASTRODUODENOSCOPY (EGD);  Surgeon: Santi Rosas MD;  [facility]     HEAD & NECK SURGERY      2017 at [facility].      IMPLANT GOLD WEIGHT EYELID Right 2017    Procedure: IMPLANT WEIGHT EYELID;  Right Upper Eyelid Weight, right tarsal strip lower eyelid;  Surgeon: Milana Malave MD;  [facility]     IR CHEMO EMBOLIZATION  2019     KNEE SURGERY Left      ORTHOPEDIC SURGERY       PAROTIDECTOMY, RADICAL NECK DISSECTION Right 2017    Procedure: PAROTIDECTOMY, RADICAL NECK DISSECTION;  Right Superfacial Parotidectomy , Facial nerve repair. with [facility] facial nerve monitor.;  Surgeon: Asiya Morgan MD;  [facility]     PICC INSERTION Left 2017    4fr SL BioFlo PICC, 44cm in the L basilic vein w/ tip in the low SVC     RETURN LIVER TRANSPLANT N/A 2019    Procedure: Exploratory laparotomy, hematoma evacuation, abdominal washout;  Surgeon: Александр Ramos MD;  [facility]     TRANSPLANT LIVER RECIPIENT  DONOR N/A 2019    Procedure: TRANSPLANT, LIVER, RECIPIENT,  DONOR;  Surgeon: Александр Ramos MD;  [facility]: [facility] OR     VASCULAR SURGERY         CURRENT MEDICATIONS:   Current Outpatient Medications   Medication     Acetaminophen (TYLENOL) 325 MG CAPS     ARIPiprazole (ABILIFY) 5 MG tablet     aspirin (ASA) 81 MG EC tablet     BD VIKTORIA U/F 32G X 4 MM insulin pen needle     ciclopirox (LOPROX) 0.77 % cream     Continuous Blood Gluc  (FREESTYLE CLAUDIA 14 DAY READER) CECILIO     Continuous Blood Gluc Sensor (FREESTYLE CLAUDIA 14 DAY SENSOR) MISC     insulin aspart (NOVOLOG PEN) 100 UNIT/ML pen     insulin aspart (NOVOLOG PEN) 100 UNIT/ML pen     lamiVUDine (EPIVIR) 100 MG tablet     metoprolol succinate ER (TOPROL-XL) 25 MG 24 hr tablet     Multiple Vitamin (THERAVITE PO)     mycophenolate  (GENERIC EQUIVALENT) 250 MG capsule     order for DME     oxyCODONE (ROXICODONE) 5 MG tablet     oxyCODONE (ROXICODONE) 5 MG tablet     pantoprazole (PROTONIX) 40 MG EC tablet     polyethylene glycol (MIRALAX) 17 g packet     predniSONE (DELTASONE) 5 MG tablet     rosuvastatin (CRESTOR) 5 MG tablet     tamsulosin (FLOMAX) 0.4 MG capsule     vitamin B-12 (CYANOCOBALAMIN) 1000 MCG tablet     vitamin D3 (CHOLECALCIFEROL) 50 mcg (2000 units) tablet     Artificial Tear Solution (SM ARTIFICIAL TEARS) SOLN     methocarbamol (ROBAXIN) 750 MG tablet     senna-docusate (SENOKOT-S/PERICOLACE) 8.6-50 MG tablet     Current Facility-Administered Medications   Medication     aflibercept (EYLEA) injection prefilled syringe 2 mg     aflibercept (EYLEA) injection prefilled syringe 2 mg       ALLERGIES:      Allergies   Allergen Reactions     Codeine Other (See Comments)     Cannot take due to liver  Cannot tolerate oral narcotics     Seasonal Allergies      Sneezing, coughing, runny and itchy eyes         ROS:  Review of symptoms otherwise negative unless commented about in HPI.     LABS:  Last Basic Metabolic Panel:  Lab Results   Component Value Date     07/26/2021     06/28/2021      Lab Results   Component Value Date    POTASSIUM 5.0 07/26/2021    POTASSIUM 4.6 06/28/2021     Lab Results   Component Value Date    CHLORIDE 106 07/26/2021    CHLORIDE 107 06/28/2021     Lab Results   Component Value Date    DEBORAH 9.8 07/26/2021    DEBORAH 9.1 06/28/2021     Lab Results   Component Value Date    CO2 20 07/26/2021    CO2 25 06/28/2021     Lab Results   Component Value Date    BUN 30 07/26/2021    BUN 33 06/28/2021     Lab Results   Component Value Date    CR 1.31 07/26/2021    CR 1.62 06/28/2021     Lab Results   Component Value Date     07/26/2021     06/28/2021       Last CBC:   Lab Results   Component Value Date    WBC 8.8 07/26/2021    WBC 4.4 06/28/2021     Lab Results   Component Value Date    RBC 3.07  "07/26/2021    RBC 2.35 06/28/2021     Lab Results   Component Value Date    HGB 8.8 07/26/2021    HGB 7.3 06/28/2021     Lab Results   Component Value Date    HCT 29.1 07/26/2021    HCT 22.6 06/28/2021     No components found for: MCT  Lab Results   Component Value Date    MCV 95 07/26/2021    MCV 96 06/28/2021     Lab Results   Component Value Date    MCH 28.7 07/26/2021    MCH 31.1 06/28/2021     Lab Results   Component Value Date    MCHC 30.2 07/26/2021    MCHC 32.3 06/28/2021     Lab Results   Component Value Date    RDW 18.2 07/26/2021    RDW 15.1 06/28/2021     Lab Results   Component Value Date     07/26/2021     06/28/2021       INR:  Lab Results   Component Value Date    INR 1.28 07/14/2021    INR 1.45 07/14/2021    INR 0.96 07/12/2021    INR 1.09 01/24/2020    INR 1.14 12/05/2019    INR 1.11 11/16/2019    INR 1.12 11/15/2019    INR 1.19 11/14/2019    INR 1.24 11/14/2019    INR 1.27 11/13/2019    INR 1.26 11/13/2019    INR 1.26 11/13/2019    INR 1.28 11/13/2019    INR 1.32 11/12/2019    INR 1.38 11/12/2019         IMAGING: None    PHYSICAL EXAM:   BP (!) 83/56 (BP Location: Left arm, Patient Position: Chair, Cuff Size: Adult Regular)   Pulse 93   Ht 1.753 m (5' 9\")   Wt 70.6 kg (155 lb 9.6 oz)   SpO2 99%   BMI 22.98 kg/m    General: alert and oriented x 3, pleasant, no acute distress, normal mood and affect  Neuro: no focal deficits   CV: S1 S2, no murmurs, rubs or gallops, regular rate and rhythm, no peripheral edema  Pulm: bilateral breath sounds, clear to auscultation, easy work of breathing  Incision: incisions clean dry and intact without erythema, swelling or drainage. Sternum stable    PROCEDURES: None       ASSESSMENT/PLAN:    Mr. Workman is post-op CABG x2 (LIMA to LAD, SVG to OM) with Dr. Zapata on /2021    1. Surgically doing well overall.  Incisions are healing well with no signs of infection. Increasing activity and strength overall. We reviewed pain control strategies; " advised scheduled Tylenol. Has not been taking Robaxin, so recommended that as well. Sent one time refill for oxycodone and he is understanding about weaning this off.   2. BP is quite low 80s/50s on both arms, checked several times, but is asymptomatic. Recently seen by cardiology and had lisinopril stopped; he confirmed today that he was no longer taking. Will have him stop Coreg and switch to Toprol 12.5 mg daily.   3. [facility] planned cardiology follow up  4. Will send referral to out-patient cardiac rehab  5. Continue sternal precautions for 12 weeks from surgery date. Reviewed with patient today  6. No driving for 4 weeks from surgery date.       The total time spent with the patient was 25 minutes, > 50% of which was spent in counseling.    CC  Nerissa Hill PA-C  Cardiothoracic Surgery  Pager [phone]

## 2021-08-02 ENCOUNTER — VIRTUAL VISIT (OUTPATIENT)
Dept: BEHAVIORAL HEALTH | Facility: CLINIC | Age: 56
End: 2021-08-02
Payer: MEDICARE

## 2021-08-02 ENCOUNTER — TELEPHONE (OUTPATIENT)
Dept: INTERNAL MEDICINE | Facility: CLINIC | Age: 56
End: 2021-08-02

## 2021-08-02 DIAGNOSIS — F31.31 BIPOLAR 1 DISORDER, DEPRESSED, MILD (H): Primary | ICD-10-CM

## 2021-08-02 DIAGNOSIS — F41.1 GENERALIZED ANXIETY DISORDER: ICD-10-CM

## 2021-08-02 DIAGNOSIS — I25.10 CORONARY ARTERY DISEASE INVOLVING NATIVE CORONARY ARTERY OF NATIVE HEART WITHOUT ANGINA PECTORIS: ICD-10-CM

## 2021-08-02 PROCEDURE — 90832 PSYTX W PT 30 MINUTES: CPT | Mod: 95 | Performed by: PSYCHOLOGIST

## 2021-08-02 NOTE — TELEPHONE ENCOUNTER
[facility] Call Center    Phone Message    May a detailed message be left on voicemail: yes     Reason for Call: Order(s): Home Care Orders: Physical Therapy (PT): Orders for PT - 1x 1 week, 2x week 2 weeks, 1x week for 1 week for strengthening, activity tolerance, safety    Action Taken: Message routed to:  [facility] PCC    Travel Screening: Not Applicable

## 2021-08-02 NOTE — PROGRESS NOTES
[facility] Integrated [facility]al Health  August 2, 2021        Behavioral Health Clinician Progress Note    Patient Name: Frandy Workman           Service Type: Video visit      Service Location:  Video visit      Session Start Time: 10:05  Session End Time: 10:21      Session Length: 16 - 37      Attendees: Patient    Visit Activities (Refresh list every visit): [facility] Only     Telemedicine Visit: The patient's condition can be safely assessed and treated via synchronous audio and visual telemedicine encounter.      Reason for Telemedicine Visit: COVID-19    Originating Site (Patient Location): Patient's home    Distant Site (Provider Location): Provider Remote Setting    Consent:  The patient/guardian has verbally consented to: the potential risks and benefits of telemedicine (video visit) versus in person care; bill my insurance or make self-payment for services provided; and responsibility for payment of non-covered services.     Mode of Communication:  Video Conference via [employer]    As the provider I attest to compliance with applicable laws and regulations related to telemedicine.    Diagnostic Assessment Date: 12/03/2020  Treatment Plan Review Date:  11/2/2021; updated  See Flowsheets for today's PHQ-9 and LIZZETTE-7 results  Previous PHQ-9:   PHQ-9 SCORE 10/13/2020 12/29/2020 4/7/2021   PHQ-9 Total Score MyChart 8 (Mild depression) 5 (Mild depression) 7 (Mild depression)   PHQ-9 Total Score 8 5 7     Previous LIZZETTE-7:   LIZZETTE-7 SCORE 10/13/2020 12/29/2020 4/7/2021   Total Score 6 (mild anxiety) 8 (mild anxiety) 9 (mild anxiety)   Total Score 6 8 9        Social Connections: Socially Isolated     Frequency of Communication with Friends and Family: Once a week     Frequency of Social Gatherings with Friends and Family: Once a week     Attends Confucianist Services: Never     Active Member of Clubs or Organizations: No     Attends Club or Organization Meetings: Never     Marital  Status:       HEATH LEVEL:  No flowsheet data found.    DATA  Extended Session (60+ minutes): No  Interactive Complexity: No  Crisis: No    Treatment Objective(s) Addressed in This Session:  Target Behavior(s): disease management/lifestyle changes        Current Stressors / Issues:  [facility] [place] met with Miller today over telemedicine to continue supporting his treatment of mood difficulties and adjusting to chronic disease problems. Miller presented today a few minutes late for session, stating he is feeling very tired and is still recovering from a double bypass surgery he underwent a few weeks ago. He states feeling very weak, tired most of the time so he understandably wanted to check-in today, give me a few updates, and discuss steps moving forward with behavioral health. We talked about his procedure, how he continues to experience pain in his chest from this. He reports doing PT, which is hard but helpful he says. [facility] [place] mainly provided supportive counseling today and due to Miller feeling tired, we agreed to keep our session short. Moving forward, we set up an appointment in a few weeks to continue his progress toward reducing mood symptoms related to his illness.     Progress on Treatment Objective(s) / Homework:  Satisfactory progress - ACTION (Actively working towards change); Intervened by reinforcing change plan / affirming steps taken    Supportive emphasized today - mainly checked-in/support for him, reviewed emotional status, reviewed care plan        Answers for HPI/ROS submitted by the patient on 4/7/2021   LIZZETTE 7 TOTAL SCORE: 9  If you checked off any problems, how difficult have these problems made it for you to do your work, take care of things at home, or get along with other people?: Very difficult  PHQ9 TOTAL SCORE: 7*    *No SI    Answers for HPI/ROS submitted by the patient on 10/13/2020   If you checked off any problems, how difficult have these problems made it for you to do your work, take care of things  at home, or get along with other people?: Somewhat difficult  PHQ9 TOTAL SCORE: 8*  LIZZETTE 7 TOTAL SCORE: 6      *No SI     Answers for HPI/ROS submitted by the patient on 12/29/2020   If you checked off any problems, how difficult have these problems made it for you to do your work, take care of things at home, or get along with other people?: Somewhat difficult  PHQ9 TOTAL SCORE: 5*  LIZZETTE 7 TOTAL SCORE: 8    *No SI       Care Plan review completed: yes    Medication Review:  No changes to current psychiatric medication(s)     Medication Compliance:  Yes    Changes in Health Issues:   None reported    Chemical Use Review:   Substance Use: Chemical use reviewed, no active concerns identified      Tobacco Use: No current tobacco use.         Assessment: Current Emotional / Mental Status (status of significant symptoms):  Risk status (Self / Other harm or suicidal ideation)  Patient denies a history of suicidal ideation, suicide attempts, self-injurious behavior, homicidal ideation, homicidal behavior and and other safety concerns     Patient denies current fears or concerns for personal safety.  Patient denies current or recent suicidal ideation or behaviors.  Patient denies current or recent homicidal ideation or behaviors.  Patient denies current or recent self injurious behavior or ideation.  Patient denies other safety concerns.     A safety and risk management plan has not been developed at this time, however patient was encouraged to call [place] [phone] should there be a change in any of these risk factors.    [place] Suicide Severity Rating Scale (Short Version)  [place] Suicide Severity Rating (Short Version) 1/24/2019 11/10/2019 12/2/2019 12/10/2019 6/11/2020 7/1/2020 7/12/2021   Over the past 2 weeks have you felt down, depressed, or hopeless? - no yes yes no no no   Over the past 2 weeks have you had thoughts of killing yourself? - no yes no no no no   Comments - - lots of stressors, has thought about  not taking meds - - - -   Have you ever attempted to kill yourself? - no no no no no no   Q1 Wished to be Dead (Past Month) no - other (see comments) no - - -   Comments - - why did i do this surgery - - - -   Q2 Suicidal Thoughts (Past Month) no - no no - - -   Comments - - wishes he wouldn't have gone through surgery - - - -   Q3 Suicidal Thought Method - - no no - - -   Q4 Suicidal Intent without Specific Plan - - no no - - -   Q5 Suicide Intent with Specific Plan - - no no - - -   Q6 Suicide Behavior (Lifetime) no - no no - - -         Appearance:   Appropriate   Eye Contact:   Good   Psychomotor Behavior: Restless   Attitude:   Cooperative  Interested Friendly Pleasant  Orientation:   All  Speech   Rate / Production: Normal/ Responsive   Volume:  Normal   Mood:    Normal  Affect:    Appropriate    Thought Content:  Clear   Thought Form:  Goal Directed  Logical   Insight:    Good     Diagnoses:  1. Bipolar 1 disorder, depressed, mild (H)    2. Generalized anxiety disorder          Collateral Reports Completed:  Not Applicable    Plan: (Homework, other):  Continue with this writer for individual psychotherapy in three weeks.     Joseph Murillo, RED  August 2, 2021          ______________________________________________________________________    [facility] Integrated [facility]al Health Treatment Plan    Client's Name: Frandy Workman  YOB: 1965    Date: August 2, 2021      DSM-V Diagnoses: 296.51 Bipolar I Disorder Current or Most Recent Episode Depressed, Mild;     Clinical Global Impressions  First:  Considering your total clinical experience with this particular patient population, how severe are the patient's symptoms at this time?: 2 (8/2/2021 10:26 AM)      Most recent:  Compared to the patient's condition at the START of treatment, this patient's condition is: 2 (12/18/2020  2:22 PM)        Referral / Collaboration:  Will collaborate with care team as indicated during treatment.    Anticipated  number of session or this episode of care: 10+      MeasurableTreatment Goal(s) related to diagnosis / functional impairment(s)  Goal 1:  Patient will experience a reduction in depressive and anxious symptoms, along with a corresponding increase in positive emotion and life satisfaction.    Objective #A: Patient will experience a reduction in depressed mood, will develop more effective coping skills to manage depressive symptoms, will develop healthy cognitive patterns and beliefs, will increase ability to function adaptively and will continue to take medications as prescribed / participate in supportive activities and services    Status: Continued - Date(s): August 2, 2021    Objective #B: Patient will experience a reduction in anxiety, will develop more effective coping skills to manage anxiety symptoms, will develop healthy cognitive patterns and beliefs and will increase ability to function adaptively  Status: Continued - Date(s): August 2, 2021    Objective #C: Patient will develop better understanding of triggers and coping strategies to stabilize mood  Status: Continued - Date(s): August 2, 2021    Goal 2:  Patient will identify and increase engagement in valued activity, i.e. improving social connections/relationships, pursuing occupational goals or personally meaningful pursuits, exploration of meaning in life.     Objective #A: Patient will identify meaningful activity in social, occupational and  personal goals, and increase behavioral activation around these goals   Status: Continued - Date(s): August 2, 2021    Objective #B: Patient will address relationship difficulties in a more adaptive manner  Status: Continued - Date(s): August 2, 2021    Objective #C: Patient will develop coping/problem-solving skills to facilitate more adaptive adjustment and will effectively address problems that interfere with adaptive functioning  Status: Continued - Date(s): August 2, 2021        Possible Therapeutic  Intervention(s)  Psycho-education regarding mental health diagnoses and treatment options    Skills training    Explore skills useful to client in current situation.    Skills include assertiveness, communication, conflict management, problem-solving, relaxation, etc.    Solution-Focused Therapy    Explore patterns in patient's relationships and discuss options for new behaviors.    Explore patterns in patient's actions and choices and discuss options for new behaviors.    Cognitive-behavioral Therapy    Discuss common cognitive distortions, identify them in patient's life.    Explore ways to challenge, replace, and act against these cognitions.    Acceptance and Commitment Therapy    Explore and identify important values in patient's life.    Discuss ways to commit to behavioral activation around these values.    Psychodynamic psychotherapy    Discuss patient's emotional dynamics and issues and how they impact behaviors.    Explore patient's history of relationships and how they impact present behaviors.    Explore how to work with and make changes in these schemas and patterns.    Narrative Therapy    Explore the patient's story of his/her life from his/her perspective.    Explore alternate ways of understanding their experience, identifying exceptions, developing new themes.    Interpersonal Psychotherapy    Explore patterns in relationships that are effective or ineffective at helping patient reach their goals, find satisfying experience.    Discuss new patterns or behaviors to engage in for improved social functioning.    Behavioral Activation    Discuss steps patient can take to become more involved in meaningful activity.    Identify barriers to these activities and explore possible solutions.    Mindfulness-Based Strategies    Discuss skills based on development and application of mindfulness.    Skills drawn from compassion-focused therapy, dialectical behavior therapy, mindfulness-based stress reduction,  mindfulness-based cognitive therapy, etc.      We have developed these goals together during our work to this point. Patient has assisted in the development of these goals and has agreed to this treatment plan.       Joseph Murillo LP  August 2, 2021

## 2021-08-03 ENCOUNTER — TELEPHONE (OUTPATIENT)
Dept: INTERNAL MEDICINE | Facility: CLINIC | Age: 56
End: 2021-08-03

## 2021-08-03 RX ORDER — ROSUVASTATIN CALCIUM 5 MG/1
5 TABLET, COATED ORAL DAILY
Qty: 90 TABLET | Refills: 3 | Status: SHIPPED | OUTPATIENT
Start: 2021-08-03 | End: 2022-07-11

## 2021-08-03 NOTE — TELEPHONE ENCOUNTER
Called [facility] and gave verbal orders per Dr. Wilson for Order(s): Home Care Orders: Other: Verbal order for medical social worker for community resources and future planning.       Adrian Valdez CMA ([facility]) at 4:09 PM on 8/3/2021

## 2021-08-03 NOTE — TELEPHONE ENCOUNTER
Called Cam and gave verbal orders per Dr. Moe for Order(s): Home Care Orders: Physical Therapy (PT): Orders for PT - 1x 1 week, 2x week 2 weeks, 1x week for 1 week for strengthening, activity tolerance, safety      Adrian Valdez CMA ([facility]) at 10:29 AM on 8/3/2021

## 2021-08-03 NOTE — TELEPHONE ENCOUNTER
[facility]    Phone Message    May a detailed message be left on voicemail: yes     Reason for Call: Order(s): Home Care Orders: Other: Verbal order for medical social worker for community resources and future planning.     Action Taken: Message routed to:  [facility] ([facility] pcc    Travel Screening: Not Applicable

## 2021-08-04 LAB
IRON SATN MFR SERPL: NORMAL %
IRON SERPL-MCNC: NORMAL UG/DL
TIBC SERPL-MCNC: NORMAL UG/DL
TRANSFERRIN SERPL-MCNC: NORMAL MG/DL
UIBC (UNSATURATED): NORMAL

## 2021-08-05 LAB — HAPTOGLOB SERPL-MCNC: NORMAL MG/DL

## 2021-08-06 ENCOUNTER — TELEPHONE (OUTPATIENT)
Dept: CARDIOLOGY | Facility: CLINIC | Age: 56
End: 2021-08-06

## 2021-08-06 DIAGNOSIS — R00.0 TACHYCARDIA: Primary | ICD-10-CM

## 2021-08-06 NOTE — TELEPHONE ENCOUNTER
Health Call Center    Phone Message    May a detailed message be left on voicemail: no     Reason for Call: Other: Grace from [facility] called to get clarification if Miller delgado be taking Rx Lisinopril. When he was discharged from the hospital, he was taking Lisinopril, but it's not on his medication list. Also, she would like to report an elevated pulse rate today 8/6/2021 of 108-110 taken at 12:10pm. Please reach out to grace with questions at [phone].     Action Taken: Message routed to:  [facility] Cardiology    Travel Screening: Not Applicable

## 2021-08-06 NOTE — TELEPHONE ENCOUNTER
Grace PATTON RN from [facility] reporting upon her arrival that Miller was out getting his mail and HR was elevated 108-110.  No other symptoms.      Normal HR 80-90's.  BP WNL of 120/60.  No other complaints.    Verified that he should be off of Lisinopril and that is was stopped 7/27.      Will review with provider if changes need to be made.

## 2021-08-09 ENCOUNTER — TELEPHONE (OUTPATIENT)
Dept: CARDIOLOGY | Facility: CLINIC | Age: 56
End: 2021-08-09

## 2021-08-09 ENCOUNTER — ALLIED HEALTH/NURSE VISIT (OUTPATIENT)
Dept: TRANSPLANT | Facility: CLINIC | Age: 56
End: 2021-08-09
Attending: INTERNAL MEDICINE
Payer: MEDICARE

## 2021-08-09 ENCOUNTER — LAB (OUTPATIENT)
Dept: LAB | Facility: CLINIC | Age: 56
End: 2021-08-09
Attending: INTERNAL MEDICINE
Payer: MEDICARE

## 2021-08-09 VITALS — OXYGEN SATURATION: 100 % | HEART RATE: 99 BPM | SYSTOLIC BLOOD PRESSURE: 125 MMHG | DIASTOLIC BLOOD PRESSURE: 71 MMHG

## 2021-08-09 DIAGNOSIS — N18.32 ANEMIA OF CHRONIC RENAL FAILURE, STAGE 3B (H): ICD-10-CM

## 2021-08-09 DIAGNOSIS — D63.1 ANEMIA OF CHRONIC RENAL FAILURE, STAGE 3B (H): ICD-10-CM

## 2021-08-09 DIAGNOSIS — N18.32 STAGE 3B CHRONIC KIDNEY DISEASE (H): ICD-10-CM

## 2021-08-09 DIAGNOSIS — N18.32 STAGE 3B CHRONIC KIDNEY DISEASE (H): Primary | ICD-10-CM

## 2021-08-09 DIAGNOSIS — Z94.4 STATUS POST LIVER TRANSPLANTATION (H): ICD-10-CM

## 2021-08-09 DIAGNOSIS — Z94.4 LIVER REPLACED BY TRANSPLANT (H): ICD-10-CM

## 2021-08-09 LAB
ALBUMIN SERPL-MCNC: 3.7 G/DL (ref 3.4–5)
ALP SERPL-CCNC: 118 U/L (ref 40–150)
ALT SERPL W P-5'-P-CCNC: 15 U/L (ref 0–70)
ANION GAP SERPL CALCULATED.3IONS-SCNC: 7 MMOL/L (ref 3–14)
AST SERPL W P-5'-P-CCNC: 5 U/L (ref 0–45)
BILIRUB DIRECT SERPL-MCNC: 0.1 MG/DL (ref 0–0.2)
BILIRUB SERPL-MCNC: 0.4 MG/DL (ref 0.2–1.3)
BUN SERPL-MCNC: 24 MG/DL (ref 7–30)
CALCIUM SERPL-MCNC: 9.4 MG/DL (ref 8.5–10.1)
CHLORIDE BLD-SCNC: 105 MMOL/L (ref 94–109)
CO2 SERPL-SCNC: 24 MMOL/L (ref 20–32)
CREAT SERPL-MCNC: 1.5 MG/DL (ref 0.66–1.25)
ERYTHROCYTE [DISTWIDTH] IN BLOOD BY AUTOMATED COUNT: 20.1 % (ref 10–15)
GFR SERPL CREATININE-BSD FRML MDRD: 51 ML/MIN/1.73M2
GLUCOSE BLD-MCNC: 283 MG/DL (ref 70–99)
HCT VFR BLD AUTO: 26.6 % (ref 40–53)
HGB BLD-MCNC: 8.2 G/DL (ref 13.3–17.7)
MAGNESIUM SERPL-MCNC: 1.8 MG/DL (ref 1.6–2.3)
MCH RBC QN AUTO: 29.9 PG (ref 26.5–33)
MCHC RBC AUTO-ENTMCNC: 30.8 G/DL (ref 31.5–36.5)
MCV RBC AUTO: 97 FL (ref 78–100)
PLATELET # BLD AUTO: 211 10E3/UL (ref 150–450)
POTASSIUM BLD-SCNC: 4.4 MMOL/L (ref 3.4–5.3)
PROT SERPL-MCNC: 7.2 G/DL (ref 6.8–8.8)
RBC # BLD AUTO: 2.74 10E6/UL (ref 4.4–5.9)
SODIUM SERPL-SCNC: 136 MMOL/L (ref 133–144)
WBC # BLD AUTO: 4.8 10E3/UL (ref 4–11)

## 2021-08-09 PROCEDURE — 36415 COLL VENOUS BLD VENIPUNCTURE: CPT | Performed by: PATHOLOGY

## 2021-08-09 PROCEDURE — 80053 COMPREHEN METABOLIC PANEL: CPT | Performed by: PATHOLOGY

## 2021-08-09 PROCEDURE — 96372 THER/PROPH/DIAG INJ SC/IM: CPT | Performed by: INTERNAL MEDICINE

## 2021-08-09 PROCEDURE — 83735 ASSAY OF MAGNESIUM: CPT | Performed by: PATHOLOGY

## 2021-08-09 PROCEDURE — 250N000011 HC RX IP 250 OP 636: Mod: EC | Performed by: INTERNAL MEDICINE

## 2021-08-09 PROCEDURE — 85027 COMPLETE CBC AUTOMATED: CPT | Performed by: PATHOLOGY

## 2021-08-09 PROCEDURE — 82248 BILIRUBIN DIRECT: CPT | Performed by: PATHOLOGY

## 2021-08-09 RX ADMIN — DARBEPOETIN ALFA 40 MCG: 40 INJECTION, SOLUTION INTRAVENOUS; SUBCUTANEOUS at 10:47

## 2021-08-09 NOTE — TELEPHONE ENCOUNTER
Discussed Zio patch with Miller.  He stated agreement and reported that today his HR was normal while walking around clinic.

## 2021-08-09 NOTE — NURSING NOTE
Frequency: every 2 weeks  Most recent or today's HGB: 8.2   Date: 2021  Date of lat dose: 2021  HGB associated with last dose given: 8.8    Blood Pressure:125/71    Diagnosis: CKD/ anemia    Ordered by: TRACE Rao MD  VIS Offered: yes    Double Checked by: HENRY Middleton    See MAR for administration details    Pt's first name, last name and  verified prior to medication administration, injection given without complications or questions.       Jesus Cristina, EMT

## 2021-08-09 NOTE — TELEPHONE ENCOUNTER
Date: 8/9/2021    Time of Call: 9:54 AM     Diagnosis:  Heart failure     [ VORB ] Ordering provider: Dr LORIE Ireland    Order: 14 day  zio patch     Order received by: Albertina Soares RN       Follow-up/additional notes: LVM for Miller with generic request to call back.  Once I have discussed with patient will cause zio patch to be sent out.

## 2021-08-09 NOTE — TELEPHONE ENCOUNTER
[facility]    Phone Message    May a detailed message be left on voicemail: yes     Reason for Call: Other: Pt is returning call from [facility] in regards to orders. Please give pt a call back to discuss.     Action Taken: Message routed to:  [facility] cardio    Travel Screening: Not Applicable

## 2021-08-10 ENCOUNTER — TELEPHONE (OUTPATIENT)
Dept: PHARMACY | Facility: CLINIC | Age: 56
End: 2021-08-10

## 2021-08-10 NOTE — TELEPHONE ENCOUNTER
Anemia Management Note  SUBJECTIVE/OBJECTIVE:  Referred by Dr. Eloy Rao on 2021  Primary Diagnosis: Anemia in Chronic Kidney Disease (N18.3, D63.1)   3b  Secondary Diagnosis:  Chronic Kidney Disease, Stage 3 (N18.3)  3b  Liver Tx: 2019  Hgb goal range:  9-10  Epo/Darbo: Aranesp 40mcg every 14 days for Hgb <10.  In Clinic.   Iron regimen:  NA.  Iron levels stable  Labs : 2022  Recent IVELISSE use, transfusion, IV iron: Aranesp   RX/TX plans :  6/10/2022     No history of stroke, MI and blood clots.  History of hepatocellular carcinoma - s/p TACE 2019 and liver transplant 2019.     Contact:            Ok to leave message regarding scheduling, medical, and billing per consent to communicate dated 10/2/19                              OK to speak with Davi Saunders (friend/POA) regarding scheduling, medical, and billing per consent to communicate dated 10/2/19    Anemia Latest Ref Rng & Units 2021   IVELISSE Dose - - - - - - 40mcg 40mcg   Hemoglobin 13.3 - 17.7 g/dL 7.8(L) 7.3(L) 7.0(L) 7.1(L) 7.7(L) 8.8(L) 8.2(L)   Ferritin 26 - 388 ng/mL - - - - - - -   PRBCs - - - - - - - -     BP Readings from Last 3 Encounters:   21 125/71   21 (!) 83/56   21 103/68     Wt Readings from Last 2 Encounters:   21 155 lb 9.6 oz (70.6 kg)   21 162 lb (73.5 kg)           ASSESSMENT:  Hgb:Not at goal/Known  TSat: at goal >30% Ferritin: At goal (>100ng/mL)    PLAN:  Dose with aranesp and RTC for hgb then aranesp if needed in 2 week(s)    Orders needed to be renewed (for next follow-up date) in EPIC: None    Iron labs due:  2021    Plan discussed with:  No call, chart review  Plan provided by:  adri    NEXT FOLLOW-UP DATE: 21  11a appt    Jie Blum RN   Anemia Services  [address] [address]   [email or web address]   Office : [phone]  Fax:  [phone]

## 2021-08-12 DIAGNOSIS — Z94.4 STATUS POST LIVER TRANSPLANTATION (H): ICD-10-CM

## 2021-08-12 RX ORDER — PREDNISONE 5 MG/1
5 TABLET ORAL DAILY
Qty: 90 TABLET | Refills: 3 | Status: SHIPPED | OUTPATIENT
Start: 2021-08-12 | End: 2021-12-20

## 2021-08-13 ENCOUNTER — ALLIED HEALTH/NURSE VISIT (OUTPATIENT)
Dept: CARDIOLOGY | Facility: CLINIC | Age: 56
End: 2021-08-13
Attending: INTERNAL MEDICINE
Payer: MEDICARE

## 2021-08-13 DIAGNOSIS — R00.0 TACHYCARDIA: ICD-10-CM

## 2021-08-13 PROCEDURE — 99207 LEADLESS EKG MONITOR 8 TO 14 DAYS: CPT | Performed by: INTERNAL MEDICINE

## 2021-08-17 ENCOUNTER — VIRTUAL VISIT (OUTPATIENT)
Dept: BEHAVIORAL HEALTH | Facility: CLINIC | Age: 56
End: 2021-08-17
Payer: MEDICARE

## 2021-08-17 DIAGNOSIS — F31.31 BIPOLAR 1 DISORDER, DEPRESSED, MILD (H): Primary | ICD-10-CM

## 2021-08-17 PROCEDURE — 90832 PSYTX W PT 30 MINUTES: CPT | Mod: 95 | Performed by: PSYCHOLOGIST

## 2021-08-17 NOTE — PROGRESS NOTES
[facility]: Integrated Behavioral Health  August 17, 2021        Behavioral Health Clinician Progress Note    Patient Name: Frandy Workman           Service Type: Video visit      Service Location:  Video visit      Session Start Time: 3:32  Session End Time: 4:02      Session Length: 16 - 37      Attendees: Patient    Visit Activities (Refresh list every visit): [facility] Only     Telemedicine Visit: The patient's condition can be safely assessed and treated via synchronous audio and visual telemedicine encounter.      Reason for Telemedicine Visit: COVID-19    Originating Site (Patient Location): Patient's home    Distant Site (Provider Location): Provider Remote Setting    Consent:  The patient/guardian has verbally consented to: the potential risks and benefits of telemedicine (video visit) versus in person care; bill my insurance or make self-payment for services provided; and responsibility for payment of non-covered services.     Mode of Communication:  Video Conference via Skipjump    As the provider I attest to compliance with applicable laws and regulations related to telemedicine.    Diagnostic Assessment Date: 12/03/2020  Treatment Plan Review Date:  11/2/2021; updated  See Flowsheets for today's PHQ-9 and LIZZETTE-7 results  Previous PHQ-9:   PHQ-9 SCORE 10/13/2020 12/29/2020 4/7/2021   PHQ-9 Total Score MyChart 8 (Mild depression) 5 (Mild depression) 7 (Mild depression)   PHQ-9 Total Score 8 5 7     Previous LIZZETTE-7:   LIZZETTE-7 SCORE 10/13/2020 12/29/2020 4/7/2021   Total Score 6 (mild anxiety) 8 (mild anxiety) 9 (mild anxiety)   Total Score 6 8 9        Social Connections: Socially Isolated     Frequency of Communication with Friends and Family: Once a week     Frequency of Social Gatherings with Friends and Family: Once a week     Attends [employer] Services: Never     Active Member of Clubs or Organizations: No     Attends Club or Organization Meetings: Never     Marital  Status:       HEATH LEVEL:  No flowsheet data found.    DATA  Extended Session (60+ minutes): No  Interactive Complexity: No  Crisis: No    Treatment Objective(s) Addressed in This Session:  Target Behavior(s): disease management/lifestyle changes        Current Stressors / Issues:  [facility] met with Miller today over telemedicine to continue supporting his treatment of mood difficulties and adjusting to chronic disease problems. Miller states he is not feeling well physically; he described some ongoing pain his chest from his recent surgery and feels chronically tired/low energy. He reported today not having much to report other than he is sleeping more and trying to focus on his recovery, which this writer supported. [facility] asked Miller about support, if he is able to connect with anyone as he recovers. He is still speaking to his friend Davi every few days and reported he has another friend who cooks him meals or takes him to appointments. [facility] asked more about his second friend, Gabrielle - Miller stated he met her while walking outside and had previously given her financial resources to help her out (she was reportedly living in a hotel/homeless) at one point. Since then, Miller states Gabrielle has been helping out more, cooking him meals and helping get him to places in exchange for money. [facility] expressed some concern to Miller about if he feels financially exploited or uncomfortable by Gabrielle. Miller states he pays her 300 dollars per month to help out with various things and feels comfortable with this arrangement. Miller did state that Gabrielle will occasionally ask him for more money, but Miller states she responds well when Miller says no. Miller did state that Gabrielle can show up to his home unannounced at times, which is bothersome to Miller, but he states she responded well to him asking her to call first.     Though Miller denied concern about Gabrielle's behavior, [facility] was recommended he exercise some caution about his relationship with Gabrielle - he was recommended  to not keep financial documents or other information with sensitive personal information on it laying around and continue to think about boundaries he wishes to establish with her.     Miller stated he was beginning to feel tired and we set up another appointment for three weeks.     Progress on Treatment Objective(s) / Homework:  Satisfactory progress - ACTION (Actively working towards change); Intervened by reinforcing change plan / affirming steps taken    Supportive emphasized today - mainly checked-in/support for him, reviewed emotional status, discussed relationship concerns    Answers for HPI/ROS submitted by the patient on 4/7/2021   LIZZETTE 7 TOTAL SCORE: 9  If you checked off any problems, how difficult have these problems made it for you to do your work, take care of things at home, or get along with other people?: Very difficult  PHQ9 TOTAL SCORE: 7*    *No SI    Answers for HPI/ROS submitted by the patient on 10/13/2020   If you checked off any problems, how difficult have these problems made it for you to do your work, take care of things at home, or get along with other people?: Somewhat difficult  PHQ9 TOTAL SCORE: 8*  LIZZETTE 7 TOTAL SCORE: 6      *No SI     Answers for HPI/ROS submitted by the patient on 12/29/2020   If you checked off any problems, how difficult have these problems made it for you to do your work, take care of things at home, or get along with other people?: Somewhat difficult  PHQ9 TOTAL SCORE: 5*  LIZZETTE 7 TOTAL SCORE: 8    *No SI       Care Plan review completed: yes    Medication Review:  No changes to current psychiatric medication(s)     Medication Compliance:  Yes    Changes in Health Issues:   None reported    Chemical Use Review:   Substance Use: Chemical use reviewed, no active concerns identified      Tobacco Use: No current tobacco use.         Assessment: Current Emotional / Mental Status (status of significant symptoms):  Risk status (Self / Other harm or suicidal  ideation)  Patient denies a history of suicidal ideation, suicide attempts, self-injurious behavior, homicidal ideation, homicidal behavior and and other safety concerns     Patient denies current fears or concerns for personal safety.  Patient denies current or recent suicidal ideation or behaviors.  Patient denies current or recent homicidal ideation or behaviors.  Patient denies current or recent self injurious behavior or ideation.  Patient denies other safety concerns.     A safety and risk management plan has not been developed at this time, however patient was encouraged to call [place] [phone] should there be a change in any of these risk factors.    [place] Suicide Severity Rating Scale (Short Version)  [place] Suicide Severity Rating (Short Version) 1/24/2019 11/10/2019 12/2/2019 12/10/2019 6/11/2020 7/1/2020 7/12/2021   Over the past 2 weeks have you felt down, depressed, or hopeless? - no yes yes no no no   Over the past 2 weeks have you had thoughts of killing yourself? - no yes no no no no   Comments - - lots of stressors, has thought about not taking meds - - - -   Have you ever attempted to kill yourself? - no no no no no no   Q1 Wished to be Dead (Past Month) no - other (see comments) no - - -   Comments - - why did i do this surgery - - - -   Q2 Suicidal Thoughts (Past Month) no - no no - - -   Comments - - wishes he wouldn't have gone through surgery - - - -   Q3 Suicidal Thought Method - - no no - - -   Q4 Suicidal Intent without Specific Plan - - no no - - -   Q5 Suicide Intent with Specific Plan - - no no - - -   Q6 Suicide Behavior (Lifetime) no - no no - - -         Appearance:   Appropriate   Eye Contact:   Good   Psychomotor Behavior: Restless   Attitude:   Cooperative  Interested Friendly Pleasant  Orientation:   All  Speech   Rate / Production: Normal/ Responsive   Volume:  Normal   Mood:    Normal  Affect:    Appropriate    Thought Content:  Clear   Thought Form:  Goal Directed   Logical   Insight:    Good     Diagnoses:  1. Bipolar 1 disorder, depressed, mild (H)          Collateral Reports Completed:  Not Applicable    Plan: (Homework, other):  Continue with this writer for individual psychotherapy in three weeks.     Joseph Murillo LP  August 17, 2021          ______________________________________________________________________    [facility] Integrated Behavioral Health Treatment Plan    Client's Name: Frandy Workman  YOB: 1965    Date: August 2, 2021      DSM-V Diagnoses: 296.51 Bipolar I Disorder Current or Most Recent Episode Depressed, Mild;     Clinical Global Impressions  First:  Considering your total clinical experience with this particular patient population, how severe are the patient's symptoms at this time?: 2 (8/2/2021 10:26 AM)      Most recent:  Compared to the patient's condition at the START of treatment, this patient's condition is: 2 (12/18/2020  2:22 PM)        Referral / Collaboration:  Will collaborate with care team as indicated during treatment.    Anticipated number of session or this episode of care: 10+      MeasurableTreatment Goal(s) related to diagnosis / functional impairment(s)  Goal 1:  Patient will experience a reduction in depressive and anxious symptoms, along with a corresponding increase in positive emotion and life satisfaction.    Objective #A: Patient will experience a reduction in depressed mood, will develop more effective coping skills to manage depressive symptoms, will develop healthy cognitive patterns and beliefs, will increase ability to function adaptively and will continue to take medications as prescribed / participate in supportive activities and services    Status: Continued - Date(s): August 2, 2021    Objective #B: Patient will experience a reduction in anxiety, will develop more effective coping skills to manage anxiety symptoms, will develop healthy cognitive patterns and beliefs and will increase ability to function  adaptively  Status: Continued - Date(s): August 2, 2021    Objective #C: Patient will develop better understanding of triggers and coping strategies to stabilize mood  Status: Continued - Date(s): August 2, 2021    Goal 2:  Patient will identify and increase engagement in valued activity, i.e. improving social connections/relationships, pursuing occupational goals or personally meaningful pursuits, exploration of meaning in life.     Objective #A: Patient will identify meaningful activity in social, occupational and  personal goals, and increase behavioral activation around these goals   Status: Continued - Date(s): August 2, 2021    Objective #B: Patient will address relationship difficulties in a more adaptive manner  Status: Continued - Date(s): August 2, 2021    Objective #C: Patient will develop coping/problem-solving skills to facilitate more adaptive adjustment and will effectively address problems that interfere with adaptive functioning  Status: Continued - Date(s): August 2, 2021        Possible Therapeutic Intervention(s)  Psycho-education regarding mental health diagnoses and treatment options    Skills training    Explore skills useful to client in current situation.    Skills include assertiveness, communication, conflict management, problem-solving, relaxation, etc.    Solution-Focused Therapy    Explore patterns in patient's relationships and discuss options for new behaviors.    Explore patterns in patient's actions and choices and discuss options for new behaviors.    Cognitive-behavioral Therapy    Discuss common cognitive distortions, identify them in patient's life.    Explore ways to challenge, replace, and act against these cognitions.    Acceptance and Commitment Therapy    Explore and identify important values in patient's life.    Discuss ways to commit to behavioral activation around these values.    Psychodynamic psychotherapy    Discuss patient's emotional dynamics and issues and how  they impact behaviors.    Explore patient's history of relationships and how they impact present behaviors.    Explore how to work with and make changes in these schemas and patterns.    Narrative Therapy    Explore the patient's story of his/her life from his/her perspective.    Explore alternate ways of understanding their experience, identifying exceptions, developing new themes.    Interpersonal Psychotherapy    Explore patterns in relationships that are effective or ineffective at helping patient reach their goals, find satisfying experience.    Discuss new patterns or behaviors to engage in for improved social functioning.    Behavioral Activation    Discuss steps patient can take to become more involved in meaningful activity.    Identify barriers to these activities and explore possible solutions.    Mindfulness-Based Strategies    Discuss skills based on development and application of mindfulness.    Skills drawn from compassion-focused therapy, dialectical behavior therapy, mindfulness-based stress reduction, mindfulness-based cognitive therapy, etc.      We have developed these goals together during our work to this point. Patient has assisted in the development of these goals and has agreed to this treatment plan.       Joseph Murillo, RED  August 2, 2021

## 2021-08-23 ENCOUNTER — ALLIED HEALTH/NURSE VISIT (OUTPATIENT)
Dept: TRANSPLANT | Facility: CLINIC | Age: 56
End: 2021-08-23
Attending: INTERNAL MEDICINE
Payer: MEDICARE

## 2021-08-23 ENCOUNTER — LAB (OUTPATIENT)
Dept: LAB | Facility: CLINIC | Age: 56
End: 2021-08-23
Attending: INTERNAL MEDICINE
Payer: MEDICARE

## 2021-08-23 ENCOUNTER — TELEPHONE (OUTPATIENT)
Dept: PHARMACY | Facility: CLINIC | Age: 56
End: 2021-08-23

## 2021-08-23 VITALS — SYSTOLIC BLOOD PRESSURE: 118 MMHG | DIASTOLIC BLOOD PRESSURE: 71 MMHG

## 2021-08-23 DIAGNOSIS — N18.32 STAGE 3B CHRONIC KIDNEY DISEASE (H): Primary | ICD-10-CM

## 2021-08-23 DIAGNOSIS — N18.32 ANEMIA OF CHRONIC RENAL FAILURE, STAGE 3B (H): ICD-10-CM

## 2021-08-23 DIAGNOSIS — D63.1 ANEMIA OF CHRONIC RENAL FAILURE, STAGE 3B (H): ICD-10-CM

## 2021-08-23 DIAGNOSIS — Z94.4 STATUS POST LIVER TRANSPLANTATION (H): ICD-10-CM

## 2021-08-23 DIAGNOSIS — N18.32 STAGE 3B CHRONIC KIDNEY DISEASE (H): ICD-10-CM

## 2021-08-23 LAB
ALBUMIN SERPL-MCNC: 3.7 G/DL (ref 3.4–5)
ALP SERPL-CCNC: 121 U/L (ref 40–150)
ALT SERPL W P-5'-P-CCNC: 16 U/L (ref 0–70)
ANION GAP SERPL CALCULATED.3IONS-SCNC: 7 MMOL/L (ref 3–14)
AST SERPL W P-5'-P-CCNC: 7 U/L (ref 0–45)
BILIRUB DIRECT SERPL-MCNC: 0.1 MG/DL (ref 0–0.2)
BILIRUB SERPL-MCNC: 0.6 MG/DL (ref 0.2–1.3)
BUN SERPL-MCNC: 24 MG/DL (ref 7–30)
CALCIUM SERPL-MCNC: 9.4 MG/DL (ref 8.5–10.1)
CHLORIDE BLD-SCNC: 107 MMOL/L (ref 94–109)
CO2 SERPL-SCNC: 27 MMOL/L (ref 20–32)
CREAT SERPL-MCNC: 1.31 MG/DL (ref 0.66–1.25)
ERYTHROCYTE [DISTWIDTH] IN BLOOD BY AUTOMATED COUNT: 20 % (ref 10–15)
GFR SERPL CREATININE-BSD FRML MDRD: 60 ML/MIN/1.73M2
GLUCOSE BLD-MCNC: 169 MG/DL (ref 70–99)
HCT VFR BLD AUTO: 22.8 % (ref 40–53)
HGB BLD-MCNC: 7 G/DL (ref 13.3–17.7)
MCH RBC QN AUTO: 30.7 PG (ref 26.5–33)
MCHC RBC AUTO-ENTMCNC: 30.7 G/DL (ref 31.5–36.5)
MCV RBC AUTO: 100 FL (ref 78–100)
PLATELET # BLD AUTO: 171 10E3/UL (ref 150–450)
POTASSIUM BLD-SCNC: 4.1 MMOL/L (ref 3.4–5.3)
PROT SERPL-MCNC: 7.1 G/DL (ref 6.8–8.8)
RBC # BLD AUTO: 2.28 10E6/UL (ref 4.4–5.9)
SODIUM SERPL-SCNC: 141 MMOL/L (ref 133–144)
WBC # BLD AUTO: 4.7 10E3/UL (ref 4–11)

## 2021-08-23 PROCEDURE — 96372 THER/PROPH/DIAG INJ SC/IM: CPT | Performed by: INTERNAL MEDICINE

## 2021-08-23 PROCEDURE — 85027 COMPLETE CBC AUTOMATED: CPT | Performed by: PATHOLOGY

## 2021-08-23 PROCEDURE — 250N000011 HC RX IP 250 OP 636: Performed by: INTERNAL MEDICINE

## 2021-08-23 PROCEDURE — 82248 BILIRUBIN DIRECT: CPT | Performed by: PATHOLOGY

## 2021-08-23 PROCEDURE — 36415 COLL VENOUS BLD VENIPUNCTURE: CPT | Performed by: PATHOLOGY

## 2021-08-23 PROCEDURE — 80053 COMPREHEN METABOLIC PANEL: CPT | Performed by: PATHOLOGY

## 2021-08-23 RX ADMIN — DARBEPOETIN ALFA 40 MCG: 40 INJECTION, SOLUTION INTRAVENOUS; SUBCUTANEOUS at 10:15

## 2021-08-23 NOTE — NURSING NOTE
Frequency: every 2 weeks  Most recent or today's HGB: 7   Date: 21  Date of lat dose: 21  HGB associated with last dose given: 8.2    Blood Pressure:118/71    Diagnosis: CKD/anemia     Ordered by: BRYANT Rao MD  VIS Offered: yes    Double Checked by: HENRY Moore    See MAR for administration details    Pt's first name, last name and  verified prior to medication administration, injection given without complications or questions.       Jesus Cristina, EMT

## 2021-08-23 NOTE — TELEPHONE ENCOUNTER
Anemia Management Note  SUBJECTIVE/OBJECTIVE:  Referred by Dr. Eloy Rao on 2021  Primary Diagnosis: Anemia in Chronic Kidney Disease (N18.3, D63.1)   3b  Secondary Diagnosis:  Chronic Kidney Disease, Stage 3 (N18.3)  3b  Liver Tx: 2019  Hgb goal range:  9-10  Epo/Darbo: Aranesp 60mcg every 14 days for Hgb <10.  In Clinic.  *dose increased to 60mcg starting with  21 dose  Iron regimen:  NA.  Iron levels stable  Labs : 2022  Recent IVELISSE use, transfusion, IV iron: Aranesp   RX/TX plans :  6/10/2022     No history of stroke, MI and blood clots.  History of hepatocellular carcinoma - s/p TACE 2019 and liver transplant 2019.     Contact:            Ok to leave message regarding scheduling, medical, and billing per consent to communicate dated 10/2/19                              OK to speak with Davi HurstSaunders (friend/POA) regarding scheduling, medical, and billing per consent to communicate dated 10/2/19    Anemia Latest Ref Rng & Units 2021   IVELISSE Dose - - - - - 40mcg 40mcg 40mcg   Hemoglobin 13.3 - 17.7 g/dL 7.3(L) 7.0(L) 7.1(L) 7.7(L) 8.8(L) 8.2(L) 7.0(L)   Ferritin 26 - 388 ng/mL - - - - - - -   PRBCs - - - - - - - -     BP Readings from Last 3 Encounters:   21 118/71   21 125/71   21 (!) 83/56     Wt Readings from Last 2 Encounters:   21 155 lb 9.6 oz (70.6 kg)   21 162 lb (73.5 kg)           ASSESSMENT:  Hgb:Not at goal/Known  TSat: Due for labs Ferritin: At goal (>100ng/mL)    PLAN:  Dose with aranesp and RTC for hgb then aranesp if needed in 2 week(s). Increased Aranesp dose to 60mcg starting with 21 dose.    Orders needed to be renewed (for next follow-up date) in EPIC: None    Iron labs due:  TSAT is due    Plan discussed with:  No call  Plan provided by:  adri    NEXT FOLLOW-UP DATE:  21  11a dose    Jie Blum RN   Anemia Services  [address] [place] [address]   [email or web address]   Office : [phone]  Fax: [phone]

## 2021-08-26 ENCOUNTER — OFFICE VISIT (OUTPATIENT)
Dept: ENDOCRINOLOGY | Facility: CLINIC | Age: 56
End: 2021-08-26
Payer: MEDICARE

## 2021-08-26 DIAGNOSIS — E11.49 TYPE II OR UNSPECIFIED TYPE DIABETES MELLITUS WITH NEUROLOGICAL MANIFESTATIONS, NOT STATED AS UNCONTROLLED(250.60) (H): Primary | ICD-10-CM

## 2021-08-26 DIAGNOSIS — E11.69 TYPE 2 DIABETES MELLITUS WITH DIABETIC FOOT DEFORMITY (H): ICD-10-CM

## 2021-08-26 DIAGNOSIS — L60.2 ONYCHAUXIS: ICD-10-CM

## 2021-08-26 DIAGNOSIS — M21.969 TYPE 2 DIABETES MELLITUS WITH DIABETIC FOOT DEFORMITY (H): ICD-10-CM

## 2021-08-26 PROCEDURE — 11719 TRIM NAIL(S) ANY NUMBER: CPT | Performed by: PODIATRIST

## 2021-08-26 PROCEDURE — 99214 OFFICE O/P EST MOD 30 MIN: CPT | Mod: 25 | Performed by: PODIATRIST

## 2021-08-26 NOTE — PROGRESS NOTES
Past Medical History:   Diagnosis Date     Anemia 2013     Arthritis      BPH (benign prostatic hyperplasia)      CAD (coronary artery disease) 4/1/2019     Cholelithiasis      Conductive hearing loss 08/16/2017     Depressive disorder 1986    Suffer effects throughout life     Gastroesophageal reflux disease 12/01/2014     HCC (hepatocellular carcinoma) (H) 1/22/2019     History of diabetic retinopathy 07/2018     HTN (hypertension)      HTN (hypertension) 11/20/2019     Hyperlipidemia      Liver cirrhosis secondary to ESTRADA (H)      Liver transplanted (H) 11/11/2019     Portal vein thrombosis 4/11/2019     Type II diabetes mellitus (H)      Patient Active Problem List   Diagnosis     Bipolar affective disorder in remission (H)     Brow ptosis     Paralytic lagophthalmos of right upper eyelid     Erectile dysfunction due to diseases classified elsewhere     HCC (hepatocellular carcinoma) (H)     Equivocal stress echocardiogram     Type 2 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, unspecified whether long term insulin use (H)     Status post coronary angiogram     CAD (coronary artery disease)     Liver transplant recipient (H)     Status post liver transplantation (H)     Immunosuppressed status (H)     Benign essential hypertension     Malnutrition related to chronic disease (H)     Hypophosphatasia     Chronic kidney disease, stage 3a     Malnutrition (H)     Anxiety     Mild recurrent major depression (H)     Anemia of chronic renal failure, stage 3a     Rekha (H)     History of coronary artery disease     Dyslipidemia     Constipation, unspecified constipation type     Excessive sweating     Stage 3b chronic kidney disease     Anemia of chronic renal failure, stage 3b     NSTEMI (non-ST elevated myocardial infarction) (H)     Past Surgical History:   Procedure Laterality Date     BYPASS GRAFT ARTERY CORONARY N/A 7/14/2021    Procedure: median sternotomy, on  cardiopulmonary bypass, CORONARY ARTERY BYPASS GRAFT (CABG) x2 with left greater saphenous vein endoscopic harvest and left internal mammery artery harvest;  Surgeon: Tom Zapata MD;  Location: [facility]     COLONOSCOPY      2015     COLONOSCOPY N/A 12/6/2019    Procedure: COLONOSCOPY, WITH POLYPECTOMY AND BIOPSY;  Surgeon: Adam Morton MD;  [facility]     CV CENTRAL VENOUS CATHETER PLACEMENT N/A 7/12/2021    Procedure: Central Venous Catheter Placement;  Surgeon: Fermin Polanco MD;  [facility]     CV CORONARY ANGIOGRAM N/A 7/12/2021    Procedure: Coronary Angiogram;  Surgeon: Fermin Polanco MD;  Location:  [facility]     CV HEART CATHETERIZATION WITH POSSIBLE INTERVENTION N/A 2/26/2019    Procedure: CORS;  Surgeon: Jagdish Hoyt MD;  Location:  [facility]     CV INTRA AORTIC BALLOON N/A 7/12/2021    Procedure: Intra Aortic Balloon Pump Insertion;  Surgeon: Fermin Polanco MD;  Location:  [facility]     ESOPHAGOSCOPY, GASTROSCOPY, DUODENOSCOPY (EGD), COMBINED N/A 11/17/2016    Procedure: COMBINED ESOPHAGOSCOPY, GASTROSCOPY, DUODENOSCOPY (EGD);  Surgeon: Santi Rosas MD;  [facility]     ESOPHAGOSCOPY, GASTROSCOPY, DUODENOSCOPY (EGD), COMBINED N/A 11/17/2017    Procedure: COMBINED ESOPHAGOSCOPY, GASTROSCOPY, DUODENOSCOPY (EGD);  EGD;  Surgeon: Santi Rosas MD;  Location: [facility]     ESOPHAGOSCOPY, GASTROSCOPY, DUODENOSCOPY (EGD), COMBINED N/A 12/28/2018    Procedure: EGD;  Surgeon: Santi Rosas MD;  [facility]     ESOPHAGOSCOPY, GASTROSCOPY, DUODENOSCOPY (EGD), COMBINED N/A 12/6/2019    Procedure: ESOPHAGOGASTRODUODENOSCOPY, WITH BIOPSY;  Surgeon: Adam Morton MD;  Location:  GI     ESOPHAGOSCOPY, GASTROSCOPY, DUODENOSCOPY (EGD), COMBINED N/A 2/13/2020    Procedure: ESOPHAGOGASTRODUODENOSCOPY (EGD);  Surgeon: Santi Rosas,  MD;  Location: [facility]     HEAD & NECK SURGERY      2017 at [facility].      IMPLANT GOLD WEIGHT EYELID Right 2017    Procedure: IMPLANT WEIGHT EYELID;  Right Upper Eyelid Weight, right tarsal strip lower eyelid;  Surgeon: Milana Malave MD;  [facility]     IR CHEMO EMBOLIZATION  2019     KNEE SURGERY Left      ORTHOPEDIC SURGERY       PAROTIDECTOMY, RADICAL NECK DISSECTION Right 2017    Procedure: PAROTIDECTOMY, RADICAL NECK DISSECTION;  Right Superfacial Parotidectomy , Facial nerve repair. with [facility] facial nerve monitor.;  Surgeon: Asiya Morgan MD;  Location: [facility]     PICC INSERTION Left 2017    4fr SL BioFlo PICC, 44cm in the L basilic vein w/ tip in the low SVC     RETURN LIVER TRANSPLANT N/A 2019    Procedure: Exploratory laparotomy, hematoma evacuation, abdominal washout;  Surgeon: Александр Ramos MD;  Location: [facility]     TRANSPLANT LIVER RECIPIENT  DONOR N/A 2019    Procedure: TRANSPLANT, LIVER, RECIPIENT,  DONOR;  Surgeon: Александр Ramos MD;  Location: [facility]     VASCULAR SURGERY       Social History     Socioeconomic History     Marital status:      Spouse name: Not on file     Number of children: Not on file     Years of education: Not on file     Highest education level: Bachelor's degree (e.g., BA, AB, BS)   Occupational History     Not on file   Tobacco Use     Smoking status: Former Smoker     Packs/day: 6.00     Years: 30.00     Pack years: 180.00     Types: Cigars     Start date: 2016     Quit date: 10/25/2017     Years since quitting: 3.8     Smokeless tobacco: Former User     Types: Chew     Quit date: 10/31/2017     Tobacco comment: 1 tin per week   Substance and Sexual Activity     Alcohol use: No     Alcohol/week: 0.0 standard drinks     Comment: quit 1996     Drug use: No     Sexual activity: Not Currently     Partners: Female     Birth control/protection: Condom   Other Topics Concern     Parent/sibling w/  CABG, MI or angioplasty before 65F 55M? Yes   Social History Narrative    Prior [facility] [place], [facility]     Social Determinants of Health     Financial Resource Strain: Low Risk      Difficulty of Paying Living Expenses: Not very hard   Food Insecurity: No Food Insecurity     Worried About Running Out of Food in the Last Year: Never true     Ran Out of Food in the Last Year: Never true   Transportation Needs: No Transportation Needs     Lack of Transportation (Medical): No     Lack of Transportation (Non-Medical): No   Physical Activity: Insufficiently Active     Days of Exercise per Week: 1 day     Minutes of Exercise per Session: 60 min   Stress: Stress Concern Present     Feeling of Stress : To some extent   Social Connections: Socially Isolated     Frequency of Communication with Friends and Family: Once a week     Frequency of Social Gatherings with Friends and Family: Once a week     Attends [employer] Services: Never     Active Member of Clubs or Organizations: No     Attends Club or Organization Meetings: Never     Marital Status:    Intimate Partner Violence:      Fear of Current or Ex-Partner:      Emotionally Abused:      Physically Abused:      Sexually Abused:      Family History   Problem Relation Age of Onset     Prostate Cancer Maternal Grandfather      Substance Abuse Maternal Grandfather         Alcohol     Colon Cancer Father 60     Pancreatic Cancer Father 60     Prostate Cancer Father      Colorectal Cancer Father      Macular Degeneration Father      Cancer Father      Glaucoma Father      Skin Cancer Father      Colorectal Cancer Maternal Grandmother      Cancer Maternal Grandmother      Substance Abuse Maternal Grandmother         Alcohol     Colorectal Cancer Paternal Grandmother      Cancer Mother      Diabetes Mother          3/2016     Cerebrovascular Disease Mother         Passed away in Feb of this year, 80 years old.     Thyroid Disease Mother      Depression Mother       Asthma Sister         Had since birth     Thyroid Disease Sister      Depression Sister      Liver Disease No family hx of      Melanoma No family hx of      Lab Results   Component Value Date    A1C 6.8 07/13/2021    A1C 6.0 12/30/2020    A1C 6.3 06/13/2020    A1C 6.6 08/08/2018    A1C 6.5 06/09/2017    A1C 7.8 10/25/2016     Lab Results   Component Value Date    WBC 4.7 08/23/2021    WBC 4.4 06/28/2021     Lab Results   Component Value Date    RBC 2.28 08/23/2021    RBC 2.35 06/28/2021     Lab Results   Component Value Date    HGB 7.0 08/23/2021    HGB 7.3 06/28/2021     Lab Results   Component Value Date    HCT 22.8 08/23/2021    HCT 22.6 06/28/2021     No components found for: MCT  Lab Results   Component Value Date     08/23/2021    MCV 96 06/28/2021     Lab Results   Component Value Date    MCH 30.7 08/23/2021    MCH 31.1 06/28/2021     Lab Results   Component Value Date    MCHC 30.7 08/23/2021    MCHC 32.3 06/28/2021     Lab Results   Component Value Date    RDW 20.0 08/23/2021    RDW 15.1 06/28/2021     Lab Results   Component Value Date     08/23/2021     06/28/2021     Last Comprehensive Metabolic Panel:  Sodium   Date Value Ref Range Status   08/23/2021 141 133 - 144 mmol/L Final   06/28/2021 137 133 - 144 mmol/L Final     Potassium   Date Value Ref Range Status   08/23/2021 4.1 3.4 - 5.3 mmol/L Final   06/28/2021 4.6 3.4 - 5.3 mmol/L Final     Chloride   Date Value Ref Range Status   08/23/2021 107 94 - 109 mmol/L Final   06/28/2021 107 94 - 109 mmol/L Final     Carbon Dioxide   Date Value Ref Range Status   06/28/2021 25 20 - 32 mmol/L Final     Carbon Dioxide (CO2)   Date Value Ref Range Status   08/23/2021 27 20 - 32 mmol/L Final     Anion Gap   Date Value Ref Range Status   08/23/2021 7 3 - 14 mmol/L Final   06/28/2021 5 3 - 14 mmol/L Final     Glucose   Date Value Ref Range Status   08/23/2021 169 (H) 70 - 99 mg/dL Final   06/28/2021 208 (H) 70 - 99 mg/dL Final     Urea Nitrogen    Date Value Ref Range Status   08/23/2021 24 7 - 30 mg/dL Final   06/28/2021 33 (H) 7 - 30 mg/dL Final     Creatinine   Date Value Ref Range Status   08/23/2021 1.31 (H) 0.66 - 1.25 mg/dL Final   06/28/2021 1.62 (H) 0.66 - 1.25 mg/dL Final     GFR Estimate   Date Value Ref Range Status   08/23/2021 60 (L) >60 mL/min/1.73m2 Final     Comment:     As of July 11, 2021, eGFR is calculated by the CKD-EPI creatinine equation, without race adjustment. eGFR can be influenced by muscle mass, exercise, and diet. The reported eGFR is an estimation only and is only applicable if the renal function is stable.   06/28/2021 46 (L) >60 mL/min/[1.73_m2] Final     Comment:     Non  GFR Calc  Starting 12/18/2018, serum creatinine based estimated GFR (eGFR) will be   calculated using the Chronic Kidney Disease Epidemiology Collaboration   (CKD-EPI) equation.       Calcium   Date Value Ref Range Status   08/23/2021 9.4 8.5 - 10.1 mg/dL Final   06/28/2021 9.1 8.5 - 10.1 mg/dL Final     Lab Results   Component Value Date    AST 7 08/23/2021    AST 9 06/28/2021     Lab Results   Component Value Date    ALT 16 08/23/2021    ALT 20 06/28/2021     No results found for: BILICONJ   Lab Results   Component Value Date    BILITOTAL 0.6 08/23/2021    BILITOTAL 0.5 06/28/2021     Lab Results   Component Value Date    ALBUMIN 3.7 08/23/2021    ALBUMIN 4.0 06/28/2021     Lab Results   Component Value Date    PROTTOTAL 7.1 08/23/2021    PROTTOTAL 7.4 06/28/2021      Lab Results   Component Value Date    ALKPHOS 121 08/23/2021    ALKPHOS 98 06/28/2021     SUBJECTIVE FINDINGS:  A 56-year-old male returns to clinic for foot check.  He relates he had a heart attack since we seen him last.  Relates he has some numbness, tingling and neuropathy in his feet.  Relates no ulcers or sores since we have seen him last.  Relates he needs his toenails trimmed.  Relates he is using Loprox cream.  No other specific relieving or aggravating factors.   He does have diabetic shoes.       OBJECTIVE FINDINGS:  DP and PT 2/4 bilaterally.  He has dorsally contracted digits 2-5 bilaterally.  He has incurvated nails with some thickening and dystrophy 1-5 bilaterally to differing degrees.  There is no erythema, no drainage, no odor, no calor bilaterally.  There are no gross tendon voids bilaterally.  He has a laterally deviated hallux bilaterally.       ASSESSMENT/PLAN:  Onychauxis bilaterally.  He is diabetic with peripheral neuropathy and foot deformity.  His protective sensation is intact.  Diagnosis and treatment options discussed with him.  All the nails were reduced bilaterally upon consent.  Advised him on toe stretching.  He will return to clinic and see me in about 3 months.   Moderate level of medical decision making.

## 2021-08-26 NOTE — LETTER
8/26/2021       RE: Frandy Workman  [address] [place] [address]     Dear Colleague,    Thank you for referring your patient, Frandy Workman, to the [facility] [place] at [facility]. Please see a copy of my visit note below.    Past Medical History:   Diagnosis Date     Anemia 2013     Arthritis      BPH (benign prostatic hyperplasia)      CAD (coronary artery disease) 4/1/2019     Cholelithiasis      Conductive hearing loss 08/16/2017     Depressive disorder 1986    Suffer effects throughout life     Gastroesophageal reflux disease 12/01/2014     HCC (hepatocellular carcinoma) (H) 1/22/2019     History of diabetic retinopathy 07/2018     HTN (hypertension)      HTN (hypertension) 11/20/2019     Hyperlipidemia      Liver cirrhosis secondary to ESTRADA (H)      Liver transplanted (H) 11/11/2019     Portal vein thrombosis 4/11/2019     Type II diabetes mellitus (H)      Patient Active Problem List   Diagnosis     Bipolar affective disorder in remission (H)     Brow ptosis     Paralytic lagophthalmos of right upper eyelid     Erectile dysfunction due to diseases classified elsewhere     HCC (hepatocellular carcinoma) (H)     Equivocal stress echocardiogram     Type 2 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, unspecified whether long term insulin use (H)     Status post coronary angiogram     CAD (coronary artery disease)     Liver transplant recipient (H)     Status post liver transplantation (H)     Immunosuppressed status (H)     Benign essential hypertension     Malnutrition related to chronic disease (H)     Hypophosphatasia     Chronic kidney disease, stage 3a     Malnutrition (H)     Anxiety     Mild recurrent major depression (H)     Anemia of chronic renal failure, stage 3a     Rekha (H)     History of coronary artery disease     Dyslipidemia     Constipation, unspecified  constipation type     Excessive sweating     Stage 3b chronic kidney disease     Anemia of chronic renal failure, stage 3b     NSTEMI (non-ST elevated myocardial infarction) (H)     Past Surgical History:   Procedure Laterality Date     BYPASS GRAFT ARTERY CORONARY N/A 7/14/2021    Procedure: median sternotomy, on cardiopulmonary bypass, CORONARY ARTERY BYPASS GRAFT (CABG) x2 with left greater saphenous vein endoscopic harvest and left internal mammery artery harvest;  Surgeon: Tom Zapata MD;  Location: [facility] OR     COLONOSCOPY      2015     COLONOSCOPY N/A 12/6/2019    Procedure: COLONOSCOPY, WITH POLYPECTOMY AND BIOPSY;  Surgeon: Adam Morton MD;  Location:  GI     CV CENTRAL VENOUS CATHETER PLACEMENT N/A 7/12/2021    Procedure: Central Venous Catheter Placement;  Surgeon: Fermin Polanco MD;  Location:  HEART CARDIAC CATH LAB     CV CORONARY ANGIOGRAM N/A 7/12/2021    Procedure: Coronary Angiogram;  Surgeon: Fermin Polanco MD;  Location:  HEART CARDIAC CATH LAB     CV HEART CATHETERIZATION WITH POSSIBLE INTERVENTION N/A 2/26/2019    Procedure: CORS;  Surgeon: Jagdish Hoyt MD;  Location:  HEART CARDIAC CATH LAB     CV INTRA AORTIC BALLOON N/A 7/12/2021    Procedure: Intra Aortic Balloon Pump Insertion;  Surgeon: Fermin Polanco MD;  Location:  HEART CARDIAC CATH LAB     ESOPHAGOSCOPY, GASTROSCOPY, DUODENOSCOPY (EGD), COMBINED N/A 11/17/2016    Procedure: COMBINED ESOPHAGOSCOPY, GASTROSCOPY, DUODENOSCOPY (EGD);  Surgeon: Santi Rosas MD;  Location:  GI     ESOPHAGOSCOPY, GASTROSCOPY, DUODENOSCOPY (EGD), COMBINED N/A 11/17/2017    Procedure: COMBINED ESOPHAGOSCOPY, GASTROSCOPY, DUODENOSCOPY (EGD);  EGD;  Surgeon: Santi Rosas MD;  Location:  GI     ESOPHAGOSCOPY, GASTROSCOPY, DUODENOSCOPY (EGD), COMBINED N/A 12/28/2018    Procedure: EGD;  Surgeon: Santi Rosas MD;  [facility]: [facility]     ESOPHAGOSCOPY,  GASTROSCOPY, DUODENOSCOPY (EGD), COMBINED N/A 2019    Procedure: ESOPHAGOGASTRODUODENOSCOPY, WITH BIOPSY;  Surgeon: Adam Morton MD;  Location:  GI     ESOPHAGOSCOPY, GASTROSCOPY, DUODENOSCOPY (EGD), COMBINED N/A 2020    Procedure: ESOPHAGOGASTRODUODENOSCOPY (EGD);  Surgeon: Santi Rosas MD;  Location:  GI     HEAD & NECK SURGERY      2017 at [facility].      IMPLANT GOLD WEIGHT EYELID Right 2017    Procedure: IMPLANT WEIGHT EYELID;  Right Upper Eyelid Weight, right tarsal strip lower eyelid;  Surgeon: Milana Malave MD;  Location: [facility] OR     IR CHEMO EMBOLIZATION  2019     KNEE SURGERY Left      ORTHOPEDIC SURGERY       PAROTIDECTOMY, RADICAL NECK DISSECTION Right 2017    Procedure: PAROTIDECTOMY, RADICAL NECK DISSECTION;  Right Superfacial Parotidectomy , Facial nerve repair. with NIM facial nerve monitor.;  Surgeon: Asiya Morgan MD;  Location: [facility] OR     PICC INSERTION Left 2017    4fr SL BioFlo PICC, 44cm in the L basilic vein w/ tip in the low SVC     RETURN LIVER TRANSPLANT N/A 2019    Procedure: Exploratory laparotomy, hematoma evacuation, abdominal washout;  Surgeon: Александр Ramos MD;  Location: [facility] OR     TRANSPLANT LIVER RECIPIENT  DONOR N/A 2019    Procedure: TRANSPLANT, LIVER, RECIPIENT,  DONOR;  Surgeon: Александр Ramos MD;  Location: [facility] OR     VASCULAR SURGERY       Social History     Socioeconomic History     Marital status:      Spouse name: Not on file     Number of children: Not on file     Years of education: Not on file     Highest education level: Bachelor's degree (e.g., BA, AB, BS)   Occupational History     Not on file   Tobacco Use     Smoking status: Former Smoker     Packs/day: 6.00     Years: 30.00     Pack years: 180.00     Types: Cigars     Start date: 2016     Quit date: 10/25/2017     Years since quitting: 3.8     Smokeless tobacco: Former User     Types: Chew     Quit date:  10/31/2017     Tobacco comment: 1 tin per week   Substance and Sexual Activity     Alcohol use: No     Alcohol/week: 0.0 standard drinks     Comment: quit Sept. 1996     Drug use: No     Sexual activity: Not Currently     Partners: Female     Birth control/protection: Condom   Other Topics Concern     Parent/sibling w/ CABG, MI or angioplasty before 65F 55M? Yes   Social History Narrative    [place] [facility] [place] [facility]     Social Determinants of Health     Financial Resource Strain: Low Risk      Difficulty of Paying Living Expenses: Not very hard   Food Insecurity: No Food Insecurity     Worried About Running Out of Food in the Last Year: Never true     Ran Out of Food in the Last Year: Never true   Transportation Needs: No Transportation Needs     Lack of Transportation (Medical): No     Lack of Transportation (Non-Medical): No   Physical Activity: Insufficiently Active     Days of Exercise per Week: 1 day     Minutes of Exercise per Session: 60 min   Stress: Stress Concern Present     Feeling of Stress : To some extent   Social Connections: Socially Isolated     Frequency of Communication with Friends and Family: Once a week     Frequency of Social Gatherings with Friends and Family: Once a week     Attends [employer] Services: Never     Active Member of Clubs or Organizations: No     Attends Club or Organization Meetings: Never     Marital Status:    Intimate Partner Violence:      Fear of Current or Ex-Partner:      Emotionally Abused:      Physically Abused:      Sexually Abused:      Family History   Problem Relation Age of Onset     Prostate Cancer Maternal Grandfather      Substance Abuse Maternal Grandfather         Alcohol     Colon Cancer Father 60     Pancreatic Cancer Father 60     Prostate Cancer Father      Colorectal Cancer Father      Macular Degeneration Father      Cancer Father      Glaucoma Father      Skin Cancer Father      Colorectal Cancer Maternal Grandmother      Cancer Maternal  Grandmother      Substance Abuse Maternal Grandmother         Alcohol     Colorectal Cancer Paternal Grandmother      Cancer Mother      Diabetes Mother          3/2016     Cerebrovascular Disease Mother         Passed away in Feb of this year, 80 years old.     Thyroid Disease Mother      Depression Mother      Asthma Sister         Had since birth     Thyroid Disease Sister      Depression Sister      Liver Disease No family hx of      Melanoma No family hx of      Lab Results   Component Value Date    A1C 6.8 2021    A1C 6.0 2020    A1C 6.3 2020    A1C 6.6 2018    A1C 6.5 2017    A1C 7.8 10/25/2016     Lab Results   Component Value Date    WBC 4.7 2021    WBC 4.4 2021     Lab Results   Component Value Date    RBC 2.28 2021    RBC 2.35 2021     Lab Results   Component Value Date    HGB 7.0 2021    HGB 7.3 2021     Lab Results   Component Value Date    HCT 22.8 2021    HCT 22.6 2021     No components found for: MCT  Lab Results   Component Value Date     2021    MCV 96 2021     Lab Results   Component Value Date    MCH 30.7 2021    MCH 31.1 2021     Lab Results   Component Value Date    MCHC 30.7 2021    MCHC 32.3 2021     Lab Results   Component Value Date    RDW 20.0 2021    RDW 15.1 2021     Lab Results   Component Value Date     2021     2021     Last Comprehensive Metabolic Panel:  Sodium   Date Value Ref Range Status   2021 141 133 - 144 mmol/L Final   2021 137 133 - 144 mmol/L Final     Potassium   Date Value Ref Range Status   2021 4.1 3.4 - 5.3 mmol/L Final   2021 4.6 3.4 - 5.3 mmol/L Final     Chloride   Date Value Ref Range Status   2021 107 94 - 109 mmol/L Final   2021 107 94 - 109 mmol/L Final     Carbon Dioxide   Date Value Ref Range Status   2021 25 20 - 32 mmol/L Final     Carbon Dioxide (CO2)    Date Value Ref Range Status   08/23/2021 27 20 - 32 mmol/L Final     Anion Gap   Date Value Ref Range Status   08/23/2021 7 3 - 14 mmol/L Final   06/28/2021 5 3 - 14 mmol/L Final     Glucose   Date Value Ref Range Status   08/23/2021 169 (H) 70 - 99 mg/dL Final   06/28/2021 208 (H) 70 - 99 mg/dL Final     Urea Nitrogen   Date Value Ref Range Status   08/23/2021 24 7 - 30 mg/dL Final   06/28/2021 33 (H) 7 - 30 mg/dL Final     Creatinine   Date Value Ref Range Status   08/23/2021 1.31 (H) 0.66 - 1.25 mg/dL Final   06/28/2021 1.62 (H) 0.66 - 1.25 mg/dL Final     GFR Estimate   Date Value Ref Range Status   08/23/2021 60 (L) >60 mL/min/1.73m2 Final     Comment:     As of July 11, 2021, eGFR is calculated by the CKD-EPI creatinine equation, without race adjustment. eGFR can be influenced by muscle mass, exercise, and diet. The reported eGFR is an estimation only and is only applicable if the renal function is stable.   06/28/2021 46 (L) >60 mL/min/[1.73_m2] Final     Comment:     Non  GFR Calc  Starting 12/18/2018, serum creatinine based estimated GFR (eGFR) will be   calculated using the Chronic Kidney Disease Epidemiology Collaboration   (CKD-EPI) equation.       Calcium   Date Value Ref Range Status   08/23/2021 9.4 8.5 - 10.1 mg/dL Final   06/28/2021 9.1 8.5 - 10.1 mg/dL Final     Lab Results   Component Value Date    AST 7 08/23/2021    AST 9 06/28/2021     Lab Results   Component Value Date    ALT 16 08/23/2021    ALT 20 06/28/2021     No results found for: BILICONJ   Lab Results   Component Value Date    BILITOTAL 0.6 08/23/2021    BILITOTAL 0.5 06/28/2021     Lab Results   Component Value Date    ALBUMIN 3.7 08/23/2021    ALBUMIN 4.0 06/28/2021     Lab Results   Component Value Date    PROTTOTAL 7.1 08/23/2021    PROTTOTAL 7.4 06/28/2021      Lab Results   Component Value Date    ALKPHOS 121 08/23/2021    ALKPHOS 98 06/28/2021     SUBJECTIVE FINDINGS:  A 56-year-old male returns to clinic for  foot check.  He relates he had a heart attack since we seen him last.  Relates he has some numbness, tingling and neuropathy in his feet.  Relates no ulcers or sores since we have seen him last.  Relates he needs his toenails trimmed.  Relates he is using Loprox cream.  No other specific relieving or aggravating factors.  He does have diabetic shoes.       OBJECTIVE FINDINGS:  DP and PT 2/4 bilaterally.  He has dorsally contracted digits 2-5 bilaterally.  He has incurvated nails with some thickening and dystrophy 1-5 bilaterally to differing degrees.  There is no erythema, no drainage, no odor, no calor bilaterally.  There are no gross tendon voids bilaterally.  He has a laterally deviated hallux bilaterally.       ASSESSMENT/PLAN:  Onychauxis bilaterally.  He is diabetic with peripheral neuropathy and foot deformity.  His protective sensation is intact.  Diagnosis and treatment options discussed with him.  All the nails were reduced bilaterally upon consent.  Advised him on toe stretching.  He will return to clinic and see me in about 3 months.   Moderate level of medical decision making.      Again, thank you for allowing me to participate in the care of your patient.      Sincerely,    Lamin Gonzalez DPM

## 2021-08-30 DIAGNOSIS — Z95.1 S/P CABG (CORONARY ARTERY BYPASS GRAFT): Primary | ICD-10-CM

## 2021-08-31 ENCOUNTER — MEDICAL CORRESPONDENCE (OUTPATIENT)
Dept: HEALTH INFORMATION MANAGEMENT | Facility: CLINIC | Age: 56
End: 2021-08-31

## 2021-08-31 DIAGNOSIS — Z53.9 DIAGNOSIS NOT YET DEFINED: Primary | ICD-10-CM

## 2021-09-01 ENCOUNTER — TELEPHONE (OUTPATIENT)
Dept: INTERNAL MEDICINE | Facility: CLINIC | Age: 56
End: 2021-09-01

## 2021-09-01 NOTE — TELEPHONE ENCOUNTER
I spoke with Carla for clarification of the request. She reported that she did receive the notes that Dr. Moe signed from the visit with Dr. Gonzalez, though she stated that what she actually needs is for Dr. Moe to sign the note from endocrine (Tish Toscano).    Carla reported that the signed note is needed for Medicare processing.     I will update clinic staff.    Tracey Xie) CHAVEZ Garcias

## 2021-09-01 NOTE — TELEPHONE ENCOUNTER
[facility] Call Center    Phone Message    May a detailed message be left on voicemail: yes     Reason for Call: Other: Carla was calling because the notes they received was not the right documents s they needed PA signature from Dr. Moe and not Dr. Gonzalez notes, they need the 10 page documents Correctly Updated from the PA Tish Ornelas. They will Re-Fax It with highlighted areas on what needs to be review and documented please review and follow up to address any questions or concerns than you.      Action Taken: Message routed to:  [facility] ([facility] pcc    Travel Screening: Not Applicable

## 2021-09-02 ENCOUNTER — HOSPITAL ENCOUNTER (OUTPATIENT)
Dept: CARDIAC REHAB | Facility: CLINIC | Age: 56
End: 2021-09-02
Attending: PHYSICIAN ASSISTANT
Payer: MEDICARE

## 2021-09-02 DIAGNOSIS — Z94.4 LIVER TRANSPLANT RECIPIENT (H): ICD-10-CM

## 2021-09-02 DIAGNOSIS — Z95.1 S/P CABG (CORONARY ARTERY BYPASS GRAFT): ICD-10-CM

## 2021-09-02 PROCEDURE — 93797 PHYS/QHP OP CAR RHAB WO ECG: CPT

## 2021-09-02 PROCEDURE — 93798 PHYS/QHP OP CAR RHAB W/ECG: CPT

## 2021-09-02 NOTE — CONFIDENTIAL NOTE
Orthotics noted were signed on 8/31/21 by Dr. Moe and faxed back to [facility].    Latanya Sexton on 9/2/2021 at 8:04 AM

## 2021-09-06 RX ORDER — PANTOPRAZOLE SODIUM 40 MG/1
40 TABLET, DELAYED RELEASE ORAL
Qty: 90 TABLET | Refills: 3 | Status: SHIPPED | OUTPATIENT
Start: 2021-09-06 | End: 2022-10-20

## 2021-09-07 ENCOUNTER — TELEPHONE (OUTPATIENT)
Dept: PHARMACY | Facility: CLINIC | Age: 56
End: 2021-09-07

## 2021-09-07 ENCOUNTER — ALLIED HEALTH/NURSE VISIT (OUTPATIENT)
Dept: TRANSPLANT | Facility: CLINIC | Age: 56
End: 2021-09-07
Attending: INTERNAL MEDICINE
Payer: MEDICARE

## 2021-09-07 ENCOUNTER — LAB (OUTPATIENT)
Dept: LAB | Facility: CLINIC | Age: 56
End: 2021-09-07
Payer: MEDICARE

## 2021-09-07 VITALS — DIASTOLIC BLOOD PRESSURE: 76 MMHG | OXYGEN SATURATION: 100 % | SYSTOLIC BLOOD PRESSURE: 121 MMHG | HEART RATE: 89 BPM

## 2021-09-07 DIAGNOSIS — N18.32 STAGE 3B CHRONIC KIDNEY DISEASE (H): ICD-10-CM

## 2021-09-07 DIAGNOSIS — N18.32 ANEMIA OF CHRONIC RENAL FAILURE, STAGE 3B (H): ICD-10-CM

## 2021-09-07 DIAGNOSIS — D63.1 ANEMIA OF CHRONIC RENAL FAILURE, STAGE 3B (H): ICD-10-CM

## 2021-09-07 DIAGNOSIS — N18.32 STAGE 3B CHRONIC KIDNEY DISEASE (H): Primary | ICD-10-CM

## 2021-09-07 DIAGNOSIS — Z94.4 LIVER REPLACED BY TRANSPLANT (H): ICD-10-CM

## 2021-09-07 LAB
ALBUMIN UR-MCNC: >499 MG/DL
APPEARANCE UR: ABNORMAL
BILIRUB UR QL STRIP: NEGATIVE
CHOLEST SERPL-MCNC: 176 MG/DL
COLOR UR AUTO: YELLOW
CREAT UR-MCNC: 195 MG/DL
FASTING STATUS PATIENT QL REPORTED: NO
GLUCOSE UR STRIP-MCNC: 50 MG/DL
HCT VFR BLD AUTO: 28 % (ref 40–53)
HDLC SERPL-MCNC: 34 MG/DL
HGB BLD-MCNC: 8.7 G/DL (ref 13.3–17.7)
HGB UR QL STRIP: ABNORMAL
HYALINE CASTS: 16 /LPF
KETONES UR STRIP-MCNC: NEGATIVE MG/DL
LDLC SERPL CALC-MCNC: 95 MG/DL
LEUKOCYTE ESTERASE UR QL STRIP: NEGATIVE
MUCOUS THREADS #/AREA URNS LPF: PRESENT /LPF
NITRATE UR QL: NEGATIVE
NONHDLC SERPL-MCNC: 142 MG/DL
PH UR STRIP: 5 [PH] (ref 5–7)
PROT UR-MCNC: 2.28 G/L
PROT/CREAT 24H UR: 1.17 G/G CR (ref 0–0.2)
RBC URINE: 1 /HPF
SP GR UR STRIP: 1.02 (ref 1–1.03)
SQUAMOUS EPITHELIAL: <1 /HPF
TRIGL SERPL-MCNC: 233 MG/DL
UROBILINOGEN UR STRIP-MCNC: NORMAL MG/DL
WBC URINE: 1 /HPF

## 2021-09-07 PROCEDURE — 80061 LIPID PANEL: CPT | Performed by: PATHOLOGY

## 2021-09-07 PROCEDURE — 96372 THER/PROPH/DIAG INJ SC/IM: CPT | Performed by: INTERNAL MEDICINE

## 2021-09-07 PROCEDURE — 85014 HEMATOCRIT: CPT | Performed by: PATHOLOGY

## 2021-09-07 PROCEDURE — 36415 COLL VENOUS BLD VENIPUNCTURE: CPT | Performed by: PATHOLOGY

## 2021-09-07 PROCEDURE — 85018 HEMOGLOBIN: CPT | Performed by: PATHOLOGY

## 2021-09-07 PROCEDURE — 84156 ASSAY OF PROTEIN URINE: CPT | Performed by: PATHOLOGY

## 2021-09-07 PROCEDURE — 250N000011 HC RX IP 250 OP 636: Mod: EC | Performed by: INTERNAL MEDICINE

## 2021-09-07 PROCEDURE — 81001 URINALYSIS AUTO W/SCOPE: CPT | Performed by: PATHOLOGY

## 2021-09-07 RX ADMIN — DARBEPOETIN ALFA 60 MCG: 60 INJECTION, SOLUTION INTRAVENOUS; SUBCUTANEOUS at 10:54

## 2021-09-07 NOTE — PROGRESS NOTES
Frequency: 14 days  Most recent or today's HGB: 8.7   Date:   Date of lat dose:   HGB associated with last dose given: 7.0    Blood Pressure:121/76    Diagnosis: CKD stage 3/anemia     Ordered by: Александр Wilson MD  VIS Offered: yes    Double Checked by: Rhonda Middleton    See MAR for administration details    Pt's first name, last name and  verified prior to medication administration, injection given without complications or questions.     Chasity Acosta, CMA

## 2021-09-07 NOTE — TELEPHONE ENCOUNTER
Anemia Management Note  SUBJECTIVE/OBJECTIVE:  Referred by Dr. Eloy Rao on 2021  Primary Diagnosis: Anemia in Chronic Kidney Disease (N18.3, D63.1)   3b  Secondary Diagnosis:  Chronic Kidney Disease, Stage 3 (N18.3)  3b  Liver Tx: 2019  Hgb goal range:  9-10  Epo/Darbo: Aranesp 60mcg every 14 days for Hgb <10.  In Clinic.  *dose increased to 60mcg starting with  21 dose  Iron regimen:  NA.  Iron levels stable  Labs : 2022  Recent IVELISSE use, transfusion, IV iron: Aranesp   RX/TX plans :  6/10/2022     No history of stroke, MI and blood clots.  History of hepatocellular carcinoma - s/p TACE 2019 and liver transplant 2019.     Contact:            Ok to leave message regarding scheduling, medical, and billing per consent to communicate dated 10/2/19                              OK to speak with Davi HurstSaunders (friend/POA) regarding scheduling, medical, and billing per consent to communicate dated 10/2/19    Anemia Latest Ref Rng & Units 2021   IVELISSE Dose - - - - 40mcg 40mcg 40mcg 60 mcg   Hemoglobin 13.3 - 17.7 g/dL 7.0(L) 7.1(L) 7.7(L) 8.8(L) 8.2(L) 7.0(L) 8.7(L)   Ferritin 26 - 388 ng/mL - - - - - - -   PRBCs - - - - - - - -     BP Readings from Last 3 Encounters:   21 121/76   21 118/71   21 125/71     Wt Readings from Last 2 Encounters:   21 155 lb 9.6 oz (70.6 kg)   21 162 lb (73.5 kg)           ASSESSMENT:  Hgb:At goal but rapid rise in hgb (>1pt/2 weeks) - dose was given at clinic.  TSat: Due for labs Ferritin: Due for labs    PLAN:  Dose with aranesp and RTC for hgb then aranesp if needed in 2 week(s)    Orders needed to be renewed (for next follow-up date) in EPIC: None    Iron labs due:  DUE    Plan discussed with:  NO call today  Plan provided by:  adri    NEXT FOLLOW-UP DATE:  21    Jie Blum RN   Anemia Services  [address] [place]  [address]   [email or web address]   Office : [phone]  Fax: [phone]

## 2021-09-08 ENCOUNTER — VIRTUAL VISIT (OUTPATIENT)
Dept: BEHAVIORAL HEALTH | Facility: CLINIC | Age: 56
End: 2021-09-08
Payer: MEDICARE

## 2021-09-08 DIAGNOSIS — F31.31 BIPOLAR 1 DISORDER, DEPRESSED, MILD (H): Primary | ICD-10-CM

## 2021-09-08 PROCEDURE — 90832 PSYTX W PT 30 MINUTES: CPT | Mod: 95 | Performed by: PSYCHOLOGIST

## 2021-09-08 NOTE — PROGRESS NOTES
[facility] Integrated [facility]al [facility]  September 8, 2021        Behavioral Health Clinician Progress Note    Patient Name: Frandy Workman           Service Type: Video visit      Service Location:  Video visit      Session Start Time: 1:11  Session End Time:  1:42      Session Length: 16 - 37      Attendees: Patient    Visit Activities (Refresh list every visit): [facility] [place] Only     Telemedicine Visit: The patient's condition can be safely assessed and treated via synchronous audio and visual telemedicine encounter.      Reason for Telemedicine Visit: COVID-19    Originating Site (Patient Location): Patient's home    Distant Site (Provider Location): Provider Remote Setting    Consent:  The patient/guardian has verbally consented to: the potential risks and benefits of telemedicine (video visit) versus in person care; bill my insurance or make self-payment for services provided; and responsibility for payment of non-covered services.     Mode of Communication:  Video Conference via TEVIZZ    As the provider I attest to compliance with applicable laws and regulations related to telemedicine.    Diagnostic Assessment Date: 12/03/2020  Treatment Plan Review Date:  11/2/2021  See Flowsheets for today's PHQ-9 and LIZZETTE-7 results  Previous PHQ-9:   PHQ-9 SCORE 10/13/2020 12/29/2020 4/7/2021   PHQ-9 Total Score MyChart 8 (Mild depression) 5 (Mild depression) 7 (Mild depression)   PHQ-9 Total Score 8 5 7     Previous LIZZETTE-7:   LIZZETTE-7 SCORE 10/13/2020 12/29/2020 4/7/2021   Total Score 6 (mild anxiety) 8 (mild anxiety) 9 (mild anxiety)   Total Score 6 8 9        Social Connections: Socially Isolated     Frequency of Communication with Friends and Family: Once a week     Frequency of Social Gatherings with Friends and Family: Once a week     Attends [employer] Services: Never     Active Member of Clubs or Organizations: No     Attends Club or Organization Meetings: Never     Marital Status:        HEATH LEVEL:  No flowsheet data found.    DATA  Extended Session (60+ minutes): No  Interactive Complexity: No  Crisis: No    Treatment Objective(s) Addressed in This Session:  Target Behavior(s): disease management/lifestyle changes        Current Stressors / Issues:  [facility] met with Miller today over telemedicine to continue supporting his treatment of mood difficulties and adjusting to chronic disease problems. Miller states his energy levels have improved over the last few days, citing that his hemoglobin levels have increased. As his energy increases, Miller stated he is inclined to try and get back into certain activities to continue coping with his mood symptoms. We explored ways he could continue working on sleep, getting more movement, and addressing his goals with organization. Miller stated what is difficult for him at times is not having a particular hobby or specific interest. We talked about how mindfulness can still be incorporated into a daily routine, even without a hobby. We discussed SMART goals for him regarding his sleep, movement, and organization. He identified several modifications to his routine, such as switching when he tries going for walks and decreasing the amount of time he sleeps during the day, as potentially helpful for increasing his engagement with enjoyable activities. We talked about when and how he plans to start these modifications, to which Miller said he would start this Friday.     Progress on Treatment Objective(s) / Homework:  Satisfactory progress - ACTION (Actively working towards change); Intervened by reinforcing change plan / affirming steps taken     Motivational Interviewing  Target Behavior: disease management/lifestyle changes increase daily engagement with pleasant/mastery tasks and increase physical movement    Stage of Change: PREPARATION (Decided to change - considering how)    MI Intervention: Co-Developed Goal: stay awake during the day and increase daily  walks/movement, Expressed Empathy/Understanding, Supported Autonomy, Collaboration, Evocation, Permission to raise concern or advise, Open-ended questions, Reflections: simple and complex, Rolled with resistance: Emphasized patient autonomy, Simple reflection, Complex reflection, Amplified reflection (weaker or stronger meaning) and Evoked patient agenda, Change talk (evoked) and Reframe     Change Talk Expressed by the Patient: Reasons to change Need to change    Provider Response to Change Talk: E - Evoked more info from patient about behavior change, A - Affirmed patient's thoughts, decisions, or attempts at behavior change, R - Reflected patient's change talk and S - Summarized patient's change talk statements    Solution-Focused Therapy    Explore patterns in patient's relationships and discuss options for new behaviors.    Explore patterns in patient's actions and choices and discuss options for new behaviors.    Behavioral Activation    Discuss steps patient can take to become more involved in meaningful activity.    Identify barriers to these activities and explore possible solutions.      Answers for HPI/ROS submitted by the patient on 4/7/2021   LIZZETTE 7 TOTAL SCORE: 9  If you checked off any problems, how difficult have these problems made it for you to do your work, take care of things at home, or get along with other people?: Very difficult  PHQ9 TOTAL SCORE: 7*    *No SI    Answers for HPI/ROS submitted by the patient on 10/13/2020   If you checked off any problems, how difficult have these problems made it for you to do your work, take care of things at home, or get along with other people?: Somewhat difficult  PHQ9 TOTAL SCORE: 8*  LIZZETTE 7 TOTAL SCORE: 6      *No SI     Answers for HPI/ROS submitted by the patient on 12/29/2020   If you checked off any problems, how difficult have these problems made it for you to do your work, take care of things at home, or get along with other people?: Somewhat  difficult  PHQ9 TOTAL SCORE: 5*  LIZZETTE 7 TOTAL SCORE: 8    *No SI       Care Plan review completed: yes    Medication Review:  No changes to current psychiatric medication(s)     Medication Compliance:  Yes    Changes in Health Issues:   None reported    Chemical Use Review:   Substance Use: Chemical use reviewed, no active concerns identified      Tobacco Use: No current tobacco use.         Assessment: Current Emotional / Mental Status (status of significant symptoms):  Risk status (Self / Other harm or suicidal ideation)  Patient denies a history of suicidal ideation, suicide attempts, self-injurious behavior, homicidal ideation, homicidal behavior and and other safety concerns     Patient denies current fears or concerns for personal safety.  Patient denies current or recent suicidal ideation or behaviors.  Patient denies current or recent homicidal ideation or behaviors.  Patient denies current or recent self injurious behavior or ideation.  Patient denies other safety concerns.     A safety and risk management plan has not been developed at this time, however patient was encouraged to call [place] [phone] should there be a change in any of these risk factors.    [place] Suicide Severity Rating Scale (Short Version)  [place] Suicide Severity Rating (Short Version) 1/24/2019 11/10/2019 12/2/2019 12/10/2019 6/11/2020 7/1/2020 7/12/2021   Over the past 2 weeks have you felt down, depressed, or hopeless? - no yes yes no no no   Over the past 2 weeks have you had thoughts of killing yourself? - no yes no no no no   Comments - - lots of stressors, has thought about not taking meds - - - -   Have you ever attempted to kill yourself? - no no no no no no   Q1 Wished to be Dead (Past Month) no - other (see comments) no - - -   Comments - - why did i do this surgery - - - -   Q2 Suicidal Thoughts (Past Month) no - no no - - -   Comments - - wishes he wouldn't have gone through surgery - - - -   Q3 Suicidal Thought  Method - - no no - - -   Q4 Suicidal Intent without Specific Plan - - no no - - -   Q5 Suicide Intent with Specific Plan - - no no - - -   Q6 Suicide Behavior (Lifetime) no - no no - - -         Appearance:   Appropriate   Eye Contact:   Good   Psychomotor Behavior: Restless   Attitude:   Cooperative  Interested Friendly Pleasant  Orientation:   All  Speech   Rate / Production: Normal/ Responsive   Volume:  Normal   Mood:    Normal  Affect:    Appropriate    Thought Content:  Clear   Thought Form:  Goal Directed  Logical   Insight:    Good     Diagnoses:  1. Bipolar 1 disorder, depressed, mild (H)          Collateral Reports Completed:  Not Applicable    Plan: (Homework, other):  Continue with this writer for individual psychotherapy in three weeks.     Joseph Murillo, LP  September 8, 2021            ______________________________________________________________________    [facility]al [facility] Treatment Plan    Client's Name: Frandy Workman  YOB: 1965    Date: August 2, 2021      DSM-V Diagnoses: 296.51 Bipolar I Disorder Current or Most Recent Episode Depressed, Mild;     Clinical Global Impressions  First:  Considering your total clinical experience with this particular patient population, how severe are the patient's symptoms at this time?: 2 (8/2/2021 10:26 AM)      Most recent:  Compared to the patient's condition at the START of treatment, this patient's condition is: 2 (12/18/2020  2:22 PM)        Referral / Collaboration:  Will collaborate with care team as indicated during treatment.    Anticipated number of session or this episode of care: 10+      MeasurableTreatment Goal(s) related to diagnosis / functional impairment(s)  Goal 1:  Patient will experience a reduction in depressive and anxious symptoms, along with a corresponding increase in positive emotion and life satisfaction.    Objective #A: Patient will experience a reduction in depressed mood, will develop more  effective coping skills to manage depressive symptoms, will develop healthy cognitive patterns and beliefs, will increase ability to function adaptively and will continue to take medications as prescribed / participate in supportive activities and services    Status: Continued - Date(s): August 2, 2021    Objective #B: Patient will experience a reduction in anxiety, will develop more effective coping skills to manage anxiety symptoms, will develop healthy cognitive patterns and beliefs and will increase ability to function adaptively  Status: Continued - Date(s): August 2, 2021    Objective #C: Patient will develop better understanding of triggers and coping strategies to stabilize mood  Status: Continued - Date(s): August 2, 2021    Goal 2:  Patient will identify and increase engagement in valued activity, i.e. improving social connections/relationships, pursuing occupational goals or personally meaningful pursuits, exploration of meaning in life.     Objective #A: Patient will identify meaningful activity in social, occupational and  personal goals, and increase behavioral activation around these goals   Status: Continued - Date(s): August 2, 2021    Objective #B: Patient will address relationship difficulties in a more adaptive manner  Status: Continued - Date(s): August 2, 2021    Objective #C: Patient will develop coping/problem-solving skills to facilitate more adaptive adjustment and will effectively address problems that interfere with adaptive functioning  Status: Continued - Date(s): August 2, 2021        Possible Therapeutic Intervention(s)  Psycho-education regarding mental health diagnoses and treatment options    Skills training    Explore skills useful to client in current situation.    Skills include assertiveness, communication, conflict management, problem-solving, relaxation, etc.    Solution-Focused Therapy    Explore patterns in patient's relationships and discuss options for new  behaviors.    Explore patterns in patient's actions and choices and discuss options for new behaviors.    Cognitive-behavioral Therapy    Discuss common cognitive distortions, identify them in patient's life.    Explore ways to challenge, replace, and act against these cognitions.    Acceptance and Commitment Therapy    Explore and identify important values in patient's life.    Discuss ways to commit to behavioral activation around these values.    Psychodynamic psychotherapy    Discuss patient's emotional dynamics and issues and how they impact behaviors.    Explore patient's history of relationships and how they impact present behaviors.    Explore how to work with and make changes in these schemas and patterns.    Narrative Therapy    Explore the patient's story of his/her life from his/her perspective.    Explore alternate ways of understanding their experience, identifying exceptions, developing new themes.    Interpersonal Psychotherapy    Explore patterns in relationships that are effective or ineffective at helping patient reach their goals, find satisfying experience.    Discuss new patterns or behaviors to engage in for improved social functioning.    Behavioral Activation    Discuss steps patient can take to become more involved in meaningful activity.    Identify barriers to these activities and explore possible solutions.    Mindfulness-Based Strategies    Discuss skills based on development and application of mindfulness.    Skills drawn from compassion-focused therapy, dialectical behavior therapy, mindfulness-based stress reduction, mindfulness-based cognitive therapy, etc.      We have developed these goals together during our work to this point. Patient has assisted in the development of these goals and has agreed to this treatment plan.       Joseph Murillo LP  August 2, 2021

## 2021-09-09 ENCOUNTER — TELEPHONE (OUTPATIENT)
Dept: INTERNAL MEDICINE | Facility: CLINIC | Age: 56
End: 2021-09-09

## 2021-09-09 ENCOUNTER — HOSPITAL ENCOUNTER (OUTPATIENT)
Dept: CARDIAC REHAB | Facility: CLINIC | Age: 56
End: 2021-09-09
Attending: PHYSICIAN ASSISTANT
Payer: MEDICARE

## 2021-09-09 DIAGNOSIS — N18.31 CHRONIC KIDNEY DISEASE, STAGE 3A (H): Primary | ICD-10-CM

## 2021-09-09 PROCEDURE — 93798 PHYS/QHP OP CAR RHAB W/ECG: CPT

## 2021-09-09 RX ORDER — MULTIVITAMIN WITH FOLIC ACID 400 MCG
1 TABLET ORAL DAILY
Qty: 90 TABLET | Refills: 3 | Status: SHIPPED | OUTPATIENT
Start: 2021-09-09 | End: 2022-09-02

## 2021-09-09 NOTE — TELEPHONE ENCOUNTER
Pt requesting Rx for Tab-A-Angela Multivitamins from Nerissa Moe  Thank you!  Desirae Rodriguez [facility]

## 2021-09-09 NOTE — TELEPHONE ENCOUNTER
[facility] Call Center    Phone Message    May a detailed message be left on voicemail: no     Reason for Call: Other: .  Davi with [employer] called about a form he sent over for Dr. Moe to sign but has not gotten a response.    Action Taken: Message routed to:  [facility]    Travel Screening: Not Applicable

## 2021-09-09 NOTE — CONSULTS
Miller was seen in the PLC for instructions on his feeding tube administration. He continually expressed concerns about his ability to handle doing this himself at home. I demonstrated and had him do 2 TB with the pump and attempted to simplify the instructions as best I could. Miller was able to demonstrate back the second time with only a few prompts. I encouraged him to work with the nurses on the unit and set up and administer his own feedings while in the hospital to become more comfortable with the steps involved. I expressed to the unit nurse also and Miller was agreeable with this. Literature Given was Hand washing, NG cares and Using the Infinity pump.   Gen: Thin appearing, NAD, afebrile   HEENT: EOMI, no nasal discharge, mucous membranes moist  CV: Tachycardic (115) with regular rhythm, +S1/S2, no M/R/G  Resp: CTAB, no W/R/R  GI: Abdomen soft non-distended, NTTP, no masses, PEG tube in place; insertion site c/d/i   MSK: B/l lumbar paraspinal tenderness with no midline spinal tenderness; worse with movement, no open wounds, no bruising, no LE edema, LLE foot with no swelling/skin changes and NTTP on dorsal/palmar surfaces   Neuro: A&Ox4, following commands, moving all four extremities spontaneously  Psych: appropriate mood

## 2021-09-13 ENCOUNTER — HOSPITAL ENCOUNTER (OUTPATIENT)
Dept: CARDIAC REHAB | Facility: CLINIC | Age: 56
End: 2021-09-13
Attending: PHYSICIAN ASSISTANT
Payer: MEDICARE

## 2021-09-13 PROCEDURE — 93798 PHYS/QHP OP CAR RHAB W/ECG: CPT

## 2021-09-14 ENCOUNTER — HOSPITAL ENCOUNTER (OUTPATIENT)
Dept: CARDIAC REHAB | Facility: CLINIC | Age: 56
End: 2021-09-14
Attending: PHYSICIAN ASSISTANT
Payer: MEDICARE

## 2021-09-14 PROCEDURE — 93798 PHYS/QHP OP CAR RHAB W/ECG: CPT

## 2021-09-15 ENCOUNTER — HOSPITAL ENCOUNTER (OUTPATIENT)
Dept: CARDIAC REHAB | Facility: CLINIC | Age: 56
End: 2021-09-15
Attending: PHYSICIAN ASSISTANT
Payer: MEDICARE

## 2021-09-15 LAB
BASE EXCESS BLDA CALC-SCNC: -0.2 MMOL/L (ref -9.6–2)
BASE EXCESS BLDA CALC-SCNC: -3.6 MMOL/L (ref -9.6–2)
BASE EXCESS BLDA CALC-SCNC: -5.2 MMOL/L (ref -9.6–2)
BASE EXCESS BLDA CALC-SCNC: -6.6 MMOL/L (ref -9.6–2)
BASE EXCESS BLDA CALC-SCNC: -6.8 MMOL/L (ref -9.6–2)
BASE EXCESS BLDV CALC-SCNC: -7.4 MMOL/L (ref -8.1–1.9)
CA-I BLD-MCNC: 3.4 MG/DL (ref 4.4–5.2)
CA-I BLD-MCNC: 3.6 MG/DL (ref 4.4–5.2)
CA-I BLD-MCNC: 4 MG/DL (ref 4.4–5.2)
CA-I BLD-MCNC: 4 MG/DL (ref 4.4–5.2)
CA-I BLD-MCNC: 4.2 MG/DL (ref 4.4–5.2)
CA-I BLD-MCNC: 4.6 MG/DL (ref 4.4–5.2)
COHGB MFR BLD: 100 % (ref 92–100)
GLUCOSE BLD-MCNC: 135 MG/DL (ref 70–99)
GLUCOSE BLD-MCNC: 138 MG/DL (ref 70–99)
GLUCOSE BLD-MCNC: 149 MG/DL (ref 70–99)
GLUCOSE BLD-MCNC: 157 MG/DL (ref 70–99)
GLUCOSE BLD-MCNC: 160 MG/DL (ref 70–99)
GLUCOSE BLD-MCNC: 186 MG/DL (ref 70–99)
HCO3 BLDA-SCNC: 18 MMOL/L (ref 21–28)
HCO3 BLDA-SCNC: 18 MMOL/L (ref 21–28)
HCO3 BLDA-SCNC: 20 MMOL/L (ref 21–28)
HCO3 BLDA-SCNC: 21 MMOL/L (ref 21–28)
HCO3 BLDA-SCNC: 26 MMOL/L (ref 21–28)
HCO3 BLDV-SCNC: 20 MMOL/L (ref 21–28)
HGB BLD-MCNC: 6.3 G/DL (ref 13.3–17.7)
HGB BLD-MCNC: 6.5 G/DL (ref 13.3–17.7)
HGB BLD-MCNC: 7.7 G/DL (ref 13.3–17.7)
HGB BLD-MCNC: 7.8 G/DL (ref 13.3–17.7)
HGB BLD-MCNC: 8.2 G/DL (ref 13.3–17.7)
HGB BLD-MCNC: 8.4 G/DL (ref 13.3–17.7)
LACTATE BLD-SCNC: 0.4 MMOL/L
LACTATE BLD-SCNC: 0.4 MMOL/L
LACTATE BLD-SCNC: 0.5 MMOL/L
LACTATE BLD-SCNC: 0.6 MMOL/L
LACTATE BLD-SCNC: 0.8 MMOL/L
LACTATE BLD-SCNC: 0.9 MMOL/L
O2/TOTAL GAS SETTING VFR VENT: 100 %
O2/TOTAL GAS SETTING VFR VENT: 100 %
O2/TOTAL GAS SETTING VFR VENT: 50 %
O2/TOTAL GAS SETTING VFR VENT: 75 %
O2/TOTAL GAS SETTING VFR VENT: 80 %
O2/TOTAL GAS SETTING VFR VENT: 80 %
PCO2 BLDA: 33 MM HG (ref 35–45)
PCO2 BLDA: 33 MM HG (ref 35–45)
PCO2 BLDA: 35 MM HG (ref 35–45)
PCO2 BLDA: 36 MM HG (ref 35–45)
PCO2 BLDA: 46 MM HG (ref 35–45)
PCO2 BLDV: 46 MM HG (ref 40–50)
PH BLDA: 7.35 [PH] (ref 7.35–7.45)
PH BLDA: 7.35 [PH] (ref 7.35–7.45)
PH BLDA: 7.36 [PH] (ref 7.35–7.45)
PH BLDA: 7.36 [PH] (ref 7.35–7.45)
PH BLDA: 7.38 [PH] (ref 7.35–7.45)
PH BLDV: 7.24 [PH] (ref 7.32–7.43)
PO2 BLDA: 181 MM HG (ref 80–105)
PO2 BLDA: 306 MM HG (ref 80–105)
PO2 BLDA: 402 MM HG (ref 80–105)
PO2 BLDA: 504 MM HG (ref 80–105)
PO2 BLDA: 519 MM HG (ref 80–105)
PO2 BLDV: 39 MM HG (ref 25–47)
POTASSIUM BLD-SCNC: 4.1 MMOL/L (ref 3.5–5)
POTASSIUM BLD-SCNC: 4.1 MMOL/L (ref 3.5–5)
POTASSIUM BLD-SCNC: 4.7 MMOL/L (ref 3.5–5)
POTASSIUM BLD-SCNC: 5.3 MMOL/L (ref 3.5–5)
POTASSIUM BLD-SCNC: 5.5 MMOL/L (ref 3.5–5)
POTASSIUM BLD-SCNC: 5.8 MMOL/L (ref 3.5–5)
SAO2 % BLDV: 71 % (ref 94–100)
SODIUM BLD-SCNC: 138 MMOL/L (ref 133–144)
SODIUM BLD-SCNC: 138 MMOL/L (ref 133–144)
SODIUM BLD-SCNC: 140 MMOL/L (ref 133–144)
SODIUM BLD-SCNC: 141 MMOL/L (ref 133–144)

## 2021-09-15 PROCEDURE — 93798 PHYS/QHP OP CAR RHAB W/ECG: CPT

## 2021-09-15 NOTE — CONFIDENTIAL NOTE
Spoke to Carla and advised her that as soon as I receive the form back from Dr. Moe I fax it back to her immediately.    Latanya Sexton on 9/15/2021 at 11:59 AM

## 2021-09-15 NOTE — TELEPHONE ENCOUNTER
[facility] Call [facility]    Phone Message    May a detailed message be left on voicemail: yes     Reason for Call: Other: Carla from [employer] is calling again stating they really need this form filled out today and faxed back.  Carla said it needs to be signed by pt's PCP.      For questions, please call Carla back at [phone] and her fax # is [phone]    Action Taken: Message routed to:  [facility]    Travel Screening: Not Applicable

## 2021-09-16 DIAGNOSIS — Z95.1 S/P CABG (CORONARY ARTERY BYPASS GRAFT): ICD-10-CM

## 2021-09-16 NOTE — TELEPHONE ENCOUNTER
Carla calling to check the status of forms that were to be faxed to [employer]. Please send thank you.

## 2021-09-17 NOTE — TELEPHONE ENCOUNTER
[facility] ADULT LOW STRENG 81 TBEC      Last Written Prescription Date:  7/23/21 by David Rodríguez  Last Fill Quantity: 60,   # refills: 0  Last Office Visit : 4/1/21 recommended 6 month follow up  Future Office visit:  None scheduled    Routing refill request to provider for review/approval because:  Prescribed by provider other than requested provider  Last prescribed for 30 day supply no refills  Gap in therapy?  How taking?

## 2021-09-20 ENCOUNTER — HOSPITAL ENCOUNTER (OUTPATIENT)
Dept: CARDIAC REHAB | Facility: CLINIC | Age: 56
End: 2021-09-20
Attending: PHYSICIAN ASSISTANT
Payer: MEDICARE

## 2021-09-20 PROCEDURE — 93798 PHYS/QHP OP CAR RHAB W/ECG: CPT

## 2021-09-21 ENCOUNTER — ALLIED HEALTH/NURSE VISIT (OUTPATIENT)
Dept: TRANSPLANT | Facility: CLINIC | Age: 56
End: 2021-09-21
Attending: INTERNAL MEDICINE
Payer: MEDICARE

## 2021-09-21 ENCOUNTER — TELEPHONE (OUTPATIENT)
Dept: PHARMACY | Facility: CLINIC | Age: 56
End: 2021-09-21

## 2021-09-21 ENCOUNTER — LAB (OUTPATIENT)
Dept: LAB | Facility: CLINIC | Age: 56
End: 2021-09-21
Attending: INTERNAL MEDICINE
Payer: MEDICARE

## 2021-09-21 VITALS — HEART RATE: 87 BPM | DIASTOLIC BLOOD PRESSURE: 78 MMHG | SYSTOLIC BLOOD PRESSURE: 127 MMHG

## 2021-09-21 DIAGNOSIS — N18.32 STAGE 3B CHRONIC KIDNEY DISEASE (H): ICD-10-CM

## 2021-09-21 DIAGNOSIS — N18.32 ANEMIA OF CHRONIC RENAL FAILURE, STAGE 3B (H): ICD-10-CM

## 2021-09-21 DIAGNOSIS — N18.32 STAGE 3B CHRONIC KIDNEY DISEASE (H): Primary | ICD-10-CM

## 2021-09-21 DIAGNOSIS — D63.1 ANEMIA OF CHRONIC RENAL FAILURE, STAGE 3B (H): ICD-10-CM

## 2021-09-21 LAB
HCT VFR BLD AUTO: 31.8 % (ref 40–53)
HGB BLD-MCNC: 9.6 G/DL (ref 13.3–17.7)

## 2021-09-21 PROCEDURE — 85018 HEMOGLOBIN: CPT | Performed by: PATHOLOGY

## 2021-09-21 PROCEDURE — 250N000011 HC RX IP 250 OP 636: Performed by: INTERNAL MEDICINE

## 2021-09-21 PROCEDURE — 96372 THER/PROPH/DIAG INJ SC/IM: CPT | Performed by: INTERNAL MEDICINE

## 2021-09-21 PROCEDURE — 36415 COLL VENOUS BLD VENIPUNCTURE: CPT | Performed by: PATHOLOGY

## 2021-09-21 PROCEDURE — 85014 HEMATOCRIT: CPT | Performed by: PATHOLOGY

## 2021-09-21 RX ADMIN — DARBEPOETIN ALFA 60 MCG: 60 INJECTION, SOLUTION INTRAVENOUS; SUBCUTANEOUS at 13:04

## 2021-09-21 NOTE — TELEPHONE ENCOUNTER
Anemia Management Note  SUBJECTIVE/OBJECTIVE:  Referred by Dr. Eloy Rao on 2021  Primary Diagnosis: Anemia in Chronic Kidney Disease (N18.3, D63.1)   3b  Secondary Diagnosis:  Chronic Kidney Disease, Stage 3 (N18.3)  3b  Liver Tx: 2019  Hgb goal range:  9-10  Epo/Darbo: Aranesp 60mcg every 14 days for Hgb <10.  In Clinic.  *dose increased to 60mcg starting with  21 dose  Iron regimen:  NA.  Iron levels stable  Labs : 2022  Recent IVELISSE use, transfusion, IV iron: Aranesp   RX/TX plans :  6/10/2022     No history of stroke, MI and blood clots.  History of hepatocellular carcinoma - s/p TACE 2019 and liver transplant 2019.     Contact:            Ok to leave message regarding scheduling, medical, and billing per consent to communicate dated 10/2/19                              OK to speak with Davi HurstSaunders (friend/POA) regarding scheduling, medical, and billing per consent to communicate dated 10/2/19    Anemia Latest Ref Rng & Units 2021   IVELISSE Dose - - - 40mcg 40mcg 40mcg 60 mcg 60 mcg   Hemoglobin 13.3 - 17.7 g/dL 7.1(L) 7.7(L) 8.8(L) 8.2(L) 7.0(L) 8.7(L) 9.6(L)   Ferritin 26 - 388 ng/mL - - - - - - -   PRBCs - - - - - - - -     BP Readings from Last 3 Encounters:   21 127/78   21 121/76   21 118/71     Wt Readings from Last 2 Encounters:   21 155 lb 9.6 oz (70.6 kg)   21 162 lb (73.5 kg)           ASSESSMENT:  Hgb:at goal - received dose in clinic - recommend continue current regimen  TSat: Due for labs Ferritin: Due for labs    PLAN:  Dose with aranesp and RTC for hgb then aranesp if needed in 2 week(s)    Orders needed to be renewed (for next follow-up date) in EPIC: None    Iron labs due:  Due    Plan discussed with:  NO call, chart review  Plan provided by:  adri    NEXT FOLLOW-UP DATE:  10/5/21  11am appt    Jie Blum RN   Anemia Services  [address] [place] [address]   [email or web address]   Office : [phone]  Fax: [phone]

## 2021-09-21 NOTE — PROGRESS NOTES
Frequency: Every 14 days  Most recent or today's HGB: 9.6   Date: 21  Date of last dose: 8.7  HGB associated with last dose given: 21     Blood Pressure:127/78    Diagnosis: Anemia    Ordered by: Eloy Rao MD  VIS Offered: Yes    Double Checked by: Anne-Marie ÁLVAREZ CMA    See MAR for administration details    Pt's first name, last name and  verified prior to medication administration, injection given without complications or questions.     Nereida Steiner CMA

## 2021-09-22 ENCOUNTER — HOSPITAL ENCOUNTER (OUTPATIENT)
Dept: CARDIAC REHAB | Facility: CLINIC | Age: 56
End: 2021-09-22
Attending: PHYSICIAN ASSISTANT
Payer: MEDICARE

## 2021-09-22 PROCEDURE — 93798 PHYS/QHP OP CAR RHAB W/ECG: CPT

## 2021-09-24 ENCOUNTER — ANCILLARY PROCEDURE (OUTPATIENT)
Dept: CT IMAGING | Facility: CLINIC | Age: 56
End: 2021-09-24
Attending: NURSE PRACTITIONER
Payer: MEDICARE

## 2021-09-24 ENCOUNTER — HOSPITAL ENCOUNTER (OUTPATIENT)
Dept: CARDIAC REHAB | Facility: CLINIC | Age: 56
End: 2021-09-24
Attending: PHYSICIAN ASSISTANT
Payer: MEDICARE

## 2021-09-24 ENCOUNTER — LAB (OUTPATIENT)
Dept: LAB | Facility: CLINIC | Age: 56
End: 2021-09-24
Payer: MEDICARE

## 2021-09-24 DIAGNOSIS — Z94.4 LIVER TRANSPLANT RECIPIENT (H): ICD-10-CM

## 2021-09-24 DIAGNOSIS — C22.0 HCC (HEPATOCELLULAR CARCINOMA) (H): ICD-10-CM

## 2021-09-24 DIAGNOSIS — Z94.4 LIVER REPLACED BY TRANSPLANT (H): ICD-10-CM

## 2021-09-24 LAB
AFP SERPL-MCNC: <1.5 UG/L (ref 0–8)
ALBUMIN SERPL-MCNC: 4 G/DL (ref 3.4–5)
ALP SERPL-CCNC: 78 U/L (ref 40–150)
ALT SERPL W P-5'-P-CCNC: 17 U/L (ref 0–70)
ANION GAP SERPL CALCULATED.3IONS-SCNC: 7 MMOL/L (ref 3–14)
AST SERPL W P-5'-P-CCNC: 6 U/L (ref 0–45)
BASOPHILS # BLD AUTO: 0 10E3/UL (ref 0–0.2)
BASOPHILS NFR BLD AUTO: 1 %
BILIRUB DIRECT SERPL-MCNC: 0.1 MG/DL (ref 0–0.2)
BILIRUB SERPL-MCNC: 0.6 MG/DL (ref 0.2–1.3)
BUN SERPL-MCNC: 33 MG/DL (ref 7–30)
CALCIUM SERPL-MCNC: 9.2 MG/DL (ref 8.5–10.1)
CHLORIDE BLD-SCNC: 105 MMOL/L (ref 94–109)
CO2 SERPL-SCNC: 27 MMOL/L (ref 20–32)
CREAT BLD-MCNC: 1.4 MG/DL (ref 0.7–1.3)
CREAT SERPL-MCNC: 1.3 MG/DL (ref 0.66–1.25)
EOSINOPHIL # BLD AUTO: 0.1 10E3/UL (ref 0–0.7)
EOSINOPHIL NFR BLD AUTO: 1 %
ERYTHROCYTE [DISTWIDTH] IN BLOOD BY AUTOMATED COUNT: 16 % (ref 10–15)
GFR SERPL CREATININE-BSD FRML MDRD: 55 ML/MIN/1.73M2
GFR SERPL CREATININE-BSD FRML MDRD: 61 ML/MIN/1.73M2
GLUCOSE BLD-MCNC: 140 MG/DL (ref 70–99)
HCT VFR BLD AUTO: 31.3 % (ref 40–53)
HGB BLD-MCNC: 9.8 G/DL (ref 13.3–17.7)
IMM GRANULOCYTES # BLD: 0 10E3/UL
IMM GRANULOCYTES NFR BLD: 1 %
LYMPHOCYTES # BLD AUTO: 0.7 10E3/UL (ref 0.8–5.3)
LYMPHOCYTES NFR BLD AUTO: 14 %
MAGNESIUM SERPL-MCNC: 2 MG/DL (ref 1.6–2.3)
MCH RBC QN AUTO: 31.6 PG (ref 26.5–33)
MCHC RBC AUTO-ENTMCNC: 31.3 G/DL (ref 31.5–36.5)
MCV RBC AUTO: 101 FL (ref 78–100)
MONOCYTES # BLD AUTO: 0.3 10E3/UL (ref 0–1.3)
MONOCYTES NFR BLD AUTO: 6 %
NEUTROPHILS # BLD AUTO: 3.8 10E3/UL (ref 1.6–8.3)
NEUTROPHILS NFR BLD AUTO: 77 %
NRBC # BLD AUTO: 0 10E3/UL
NRBC BLD AUTO-RTO: 0 /100
PLATELET # BLD AUTO: 149 10E3/UL (ref 150–450)
POTASSIUM BLD-SCNC: 4.6 MMOL/L (ref 3.4–5.3)
PROT SERPL-MCNC: 7.3 G/DL (ref 6.8–8.8)
RBC # BLD AUTO: 3.1 10E6/UL (ref 4.4–5.9)
SODIUM SERPL-SCNC: 139 MMOL/L (ref 133–144)
WBC # BLD AUTO: 4.8 10E3/UL (ref 4–11)

## 2021-09-24 PROCEDURE — 85025 COMPLETE CBC W/AUTO DIFF WBC: CPT | Performed by: PATHOLOGY

## 2021-09-24 PROCEDURE — 80053 COMPREHEN METABOLIC PANEL: CPT | Performed by: PATHOLOGY

## 2021-09-24 PROCEDURE — 93798 PHYS/QHP OP CAR RHAB W/ECG: CPT

## 2021-09-24 PROCEDURE — 74178 CT ABD&PLV WO CNTR FLWD CNTR: CPT | Mod: MG | Performed by: RADIOLOGY

## 2021-09-24 PROCEDURE — G1004 CDSM NDSC: HCPCS | Mod: GC | Performed by: RADIOLOGY

## 2021-09-24 PROCEDURE — 82565 ASSAY OF CREATININE: CPT | Performed by: PATHOLOGY

## 2021-09-24 PROCEDURE — 71260 CT THORAX DX C+: CPT | Mod: MG | Performed by: RADIOLOGY

## 2021-09-24 PROCEDURE — 83735 ASSAY OF MAGNESIUM: CPT | Performed by: PATHOLOGY

## 2021-09-24 PROCEDURE — 82248 BILIRUBIN DIRECT: CPT | Performed by: PATHOLOGY

## 2021-09-24 PROCEDURE — 82105 ALPHA-FETOPROTEIN SERUM: CPT | Performed by: NURSE PRACTITIONER

## 2021-09-24 PROCEDURE — 36415 COLL VENOUS BLD VENIPUNCTURE: CPT | Performed by: PATHOLOGY

## 2021-09-24 RX ORDER — IOPAMIDOL 755 MG/ML
95 INJECTION, SOLUTION INTRAVASCULAR ONCE
Status: COMPLETED | OUTPATIENT
Start: 2021-09-24 | End: 2021-09-24

## 2021-09-24 RX ADMIN — IOPAMIDOL 95 ML: 755 INJECTION, SOLUTION INTRAVASCULAR at 09:11

## 2021-09-25 DIAGNOSIS — Z95.1 S/P CABG (CORONARY ARTERY BYPASS GRAFT): ICD-10-CM

## 2021-09-27 ENCOUNTER — HOSPITAL ENCOUNTER (OUTPATIENT)
Dept: CARDIAC REHAB | Facility: CLINIC | Age: 56
End: 2021-09-27
Attending: PHYSICIAN ASSISTANT
Payer: MEDICARE

## 2021-09-27 ENCOUNTER — VIRTUAL VISIT (OUTPATIENT)
Dept: ONCOLOGY | Facility: CLINIC | Age: 56
End: 2021-09-27
Attending: NURSE PRACTITIONER
Payer: MEDICARE

## 2021-09-27 DIAGNOSIS — C22.0 HCC (HEPATOCELLULAR CARCINOMA) (H): Primary | ICD-10-CM

## 2021-09-27 DIAGNOSIS — Z94.4 LIVER TRANSPLANT RECIPIENT (H): ICD-10-CM

## 2021-09-27 PROCEDURE — 99214 OFFICE O/P EST MOD 30 MIN: CPT | Mod: 95 | Performed by: INTERNAL MEDICINE

## 2021-09-27 PROCEDURE — 93798 PHYS/QHP OP CAR RHAB W/ECG: CPT

## 2021-09-27 PROCEDURE — 999N001193 HC VIDEO/TELEPHONE VISIT; NO CHARGE

## 2021-09-27 NOTE — LETTER
"    9/27/2021         RE: Frandy Workman  [address] [facility] [address]        Dear Colleague,    Thank you for referring your patient, Frandy Workman, to the [facility]. Please see a copy of my visit note below.    Miller is a 56 year old who is being evaluated via a billable video visit.      How would you like to obtain your AVS? MyChart  If the video visit is dropped, the invitation should be resent by: Text to cell phone: [phone]  Will anyone else be joining your video visit? No        Video-Visit Details    Type of service:  Video Visit    Originating Location (pt. Location): Home    Distant Location (provider location):  [facility]     Platform used for Video Visit: [employer]    Reason for Visit: seen in f/u of hepatocellular carcinoma    Oncology HPI:   Miller Workman is a 56 year old man with a PMH of DMII, cirrhosis s/t ESTRADA, HLD, HTN, GERD, BPH. He as diagnosed with hepatocellular carcinoma in December 2018 when he was found to have 2 LIRADS 5 lesions on surveillance imaging. He underwent TACE on 1/22/19.  He is liver transplant on 11/11/2019. The explanted liver had 2 lesions that were mostly necrotic and less than 3 cm with no clearly identifiable vascular invasion.   He is here for routine surveillance today.    Interval history: Miller was interviewed via video visit. He reports doing well. He has no concerns.  Reports that he had a heart attack over the summer and needed a \"double bypass\", but he is in cardiac rehab and is recovering well.  No fevers or chills. Appetite and weight are steady. No nausea, vomiting or new abdominal pains.     Current Outpatient Medications   Medication Sig Dispense Refill     Acetaminophen (TYLENOL) 325 MG CAPS Take 325-650 mg by mouth every 4 hours as needed (pain, fever) 100 capsule 1     ARIPiprazole (ABILIFY) 5 MG tablet daily       Artificial Tear Solution (SM ARTIFICIAL TEARS) SOLN Place 1 drop into " the right eye every hour as needed (dry eyes) Apply at least 4 times daily and as needed for dry eye (Patient not taking: Reported on 7/27/2021) 15 mL 1     aspirin (SM ASPIRIN ADULT LOW STRENGTH) 81 MG EC tablet Take 2 tablets (162 mg) by mouth daily 180 tablet 1     BD VIKTORIA U/F 32G X 4 MM insulin pen needle Use 5 per day 300 each 3     ciclopirox (LOPROX) 0.77 % cream Apply topically 2 times daily To feet and toenails. 90 g 5     Continuous Blood Gluc  (FREESTYLE CLAUDIA 14 DAY READER) CECILIO Use to read blood sugars as per 's instructions. 1 each 0     Continuous Blood Gluc Sensor (FREESTYLE CLAUDIA 14 DAY SENSOR) MISC Change every 14 days. 9 each 3     insulin aspart (NOVOLOG PEN) 100 UNIT/ML pen Inject 1-5 Units Subcutaneous At Bedtime MEDIUM INSULIN RESISTANCE DOSING  Do Not give Bedtime Correction Insulin if BG less than  200. For  - 249 give 1 units. For  - 299 give 2 units. For  - 349 give 3 units. For  -399 give 4 units. For BG greater than or equal to 400 give 5 units. Notify provider if glucose greater than or equal to 350 mg/dL after administration of correction dose. If given at mealtime, administer within 30 minutes of start of meal 15 mL 0     insulin aspart (NOVOLOG PEN) 100 UNIT/ML pen Inject 1-15 Units Subcutaneous 3 times daily (with meals) 20 mL 3     lamiVUDine (EPIVIR) 100 MG tablet Take 1 tablet (100 mg) by mouth daily 90 tablet 3     methocarbamol (ROBAXIN) 750 MG tablet Take 1 tablet (750 mg) by mouth 3 times daily as needed for muscle spasms (sternal pain) (Patient not taking: Reported on 7/27/2021) 60 tablet 0     metoprolol succinate ER (TOPROL-XL) 25 MG 24 hr tablet Take 0.5 tablets (12.5 mg) by mouth daily 60 tablet 3     Multiple Vitamin (TAB-A-GLADIS) TABS Take 1 tablet by mouth daily 90 tablet 3     Multiple Vitamin (THERAVITE PO) Take 1 tablet by mouth every morning        mycophenolate (GENERIC EQUIVALENT) 250 MG capsule Take 3 capsules (750 mg)  by mouth every 12 hours 180 capsule 11     order for DME 1 Device by Device route daily Knee high compression socks 15-20 mmhg. 1 Device 0     oxyCODONE (ROXICODONE) 5 MG tablet Take 1 tablet (5 mg) by mouth every 4 hours as needed for moderate to severe pain 20 tablet 0     pantoprazole (PROTONIX) 40 MG EC tablet Take 1 tablet (40 mg) by mouth daily before breakfast 90 tablet 3     polyethylene glycol (MIRALAX) 17 g packet Take 1 packet by mouth daily       predniSONE (DELTASONE) 5 MG tablet Take 1 tablet (5 mg) by mouth daily 90 tablet 3     rosuvastatin (CRESTOR) 5 MG tablet Take 1 tablet (5 mg) by mouth daily 90 tablet 3     senna-docusate (SENOKOT-S/PERICOLACE) 8.6-50 MG tablet Take 1 tablet by mouth 2 times daily as needed for constipation (Patient not taking: Reported on 7/27/2021) 50 tablet 0     tamsulosin (FLOMAX) 0.4 MG capsule Take 1 capsule (0.4 mg) by mouth daily 90 capsule 3     vitamin B-12 (CYANOCOBALAMIN) 1000 MCG tablet Take 1 tablet (1,000 mcg) by mouth daily 30 tablet 11     vitamin D3 (CHOLECALCIFEROL) 50 mcg (2000 units) tablet Take 1 tablet (50 mcg) by mouth daily 90 tablet 3          Allergies   Allergen Reactions     Codeine Other (See Comments)     Cannot take due to liver  Cannot tolerate oral narcotics     Seasonal Allergies      Sneezing, coughing, runny and itchy eyes         Video physical exam  General: Patient appears well in no acute distress.   Skin: No visualized rash or lesions on visualized skin  Eyes: EOMI, no erythema, sclera icterus or discharge noted  Resp: Appears to be breathing comfortably without accessory muscle usage, speaking in full sentences, no cough  MSK: Appears to have normal range of motion based on visualized movements  Neurologic: No apparent tremors, facial movements symmetric  Psych: affect engaged, pleasant, alert and oriented    The rest of a comprehensive physical examination is deferred due to PHE (public health emergency) video restrictions     There  were no vitals taken for this visit.  Wt Readings from Last 4 Encounters:   07/30/21 70.6 kg (155 lb 9.6 oz)   07/27/21 73.5 kg (162 lb)   07/22/21 73.5 kg (162 lb)   05/05/21 78.8 kg (173 lb 11.2 oz)         Labs:reviewed n  [facility]      Imaging:   CT CAP 9/2021                                                                     IMPRESSION:  1. In this patient with history of hepatocellular carcinoma status  post liver transplantation there is no definite evidence for disease  recurrence within the chest abdomen or pelvis.   2. 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  3. Similar appearance of sequelae of chronic portal hypertension such  as esophageal varices and splenomegaly.  4. New 3 mm pulmonary nodule within the subpleural area adjacent to  the left upper lobe, attention on follow-up. Other sub-6 mm pulmonary  nodules are unchanged.       Impression/plan:   HCC, s/p liver transplantation on 1/11/2019  -We reviewed his imaging and labs. There is no evidence of recurrence. His pulmonary nodules are stable other than a very small new subpleural nodule that we will follow on serial imaging.   Will continue to follow with CT imaging in 6 months. Will f/u with Brenda Wilson at that time. He will continue close follow-up with Dr. Banuelos in hepatology and Dr. Moe for his diabetes care and primary health/routine health maintenance.    Patient interviewed and discussed with Dr. Cherelle Tucker MD  PGY6 Fellow. Hematology/Oncology/Transplantation    Attending note: I saw the patient in conjunction with the fellow and agree with the above.        Again, thank you for allowing me to participate in the care of your patient.        Sincerely,        Miguel Villarreal MD

## 2021-09-27 NOTE — PROGRESS NOTES
Miller is a 56 year old who is being evaluated via a billable video visit.      How would you like to obtain your AVS? [employer]  If the video visit is dropped, the invitation should be resent by: Text to cell phone: [phone]  Will anyone else be joining your video visit? No        Video-Visit Details    Type of service:  Video Visit    Originating Location (pt. Location): Home    Distant Location (provider location):  [facility]     Platform used for Video Visit: [employer]

## 2021-09-28 NOTE — PROGRESS NOTES
"Reason for Visit: seen in f/u of hepatocellular carcinoma    Oncology HPI:   Miller Workman is a 56 year old man with a PMH of DMII, cirrhosis s/t ESTRADA, HLD, HTN, GERD, BPH. He as diagnosed with hepatocellular carcinoma in December 2018 when he was found to have 2 LIRADS 5 lesions on surveillance imaging. He underwent TACE on 1/22/19.  He is liver transplant on 11/11/2019. The explanted liver had 2 lesions that were mostly necrotic and less than 3 cm with no clearly identifiable vascular invasion.   He is here for routine surveillance today.    Interval history: Miller was interviewed via video visit. He reports doing well. He has no concerns.  Reports that he had a heart attack over the summer and needed a \"double bypass\", but he is in cardiac rehab and is recovering well.  No fevers or chills. Appetite and weight are steady. No nausea, vomiting or new abdominal pains.     Current Outpatient Medications   Medication Sig Dispense Refill     Acetaminophen (TYLENOL) 325 MG CAPS Take 325-650 mg by mouth every 4 hours as needed (pain, fever) 100 capsule 1     ARIPiprazole (ABILIFY) 5 MG tablet daily       Artificial Tear Solution ( ARTIFICIAL TEARS) SOLN Place 1 drop into the right eye every hour as needed (dry eyes) Apply at least 4 times daily and as needed for dry eye (Patient not taking: Reported on 7/27/2021) 15 mL 1     aspirin ( ASPIRIN ADULT LOW STRENGTH) 81 MG EC tablet Take 2 tablets (162 mg) by mouth daily 180 tablet 1     BD VIKTORIA U/F 32G X 4 MM insulin pen needle Use 5 per day 300 each 3     ciclopirox (LOPROX) 0.77 % cream Apply topically 2 times daily To feet and toenails. 90 g 5     Continuous Blood Gluc  (FREESTYLE CLAUDIA 14 DAY READER) CECILIO Use to read blood sugars as per 's instructions. 1 each 0     Continuous Blood Gluc Sensor (FREESTYLE CLAUDIA 14 DAY SENSOR) MISC Change every 14 days. 9 each 3     insulin aspart (NOVOLOG PEN) 100 UNIT/ML pen Inject 1-5 Units Subcutaneous At Bedtime " MEDIUM INSULIN RESISTANCE DOSING  Do Not give Bedtime Correction Insulin if BG less than  200. For  - 249 give 1 units. For  - 299 give 2 units. For  - 349 give 3 units. For  -399 give 4 units. For BG greater than or equal to 400 give 5 units. Notify provider if glucose greater than or equal to 350 mg/dL after administration of correction dose. If given at mealtime, administer within 30 minutes of start of meal 15 mL 0     insulin aspart (NOVOLOG PEN) 100 UNIT/ML pen Inject 1-15 Units Subcutaneous 3 times daily (with meals) 20 mL 3     lamiVUDine (EPIVIR) 100 MG tablet Take 1 tablet (100 mg) by mouth daily 90 tablet 3     methocarbamol (ROBAXIN) 750 MG tablet Take 1 tablet (750 mg) by mouth 3 times daily as needed for muscle spasms (sternal pain) (Patient not taking: Reported on 7/27/2021) 60 tablet 0     metoprolol succinate ER (TOPROL-XL) 25 MG 24 hr tablet Take 0.5 tablets (12.5 mg) by mouth daily 60 tablet 3     Multiple Vitamin (TAB-A-GLADIS) TABS Take 1 tablet by mouth daily 90 tablet 3     Multiple Vitamin (THERAVITE PO) Take 1 tablet by mouth every morning        mycophenolate (GENERIC EQUIVALENT) 250 MG capsule Take 3 capsules (750 mg) by mouth every 12 hours 180 capsule 11     order for DME 1 Device by Device route daily Knee high compression socks 15-20 mmhg. 1 Device 0     oxyCODONE (ROXICODONE) 5 MG tablet Take 1 tablet (5 mg) by mouth every 4 hours as needed for moderate to severe pain 20 tablet 0     pantoprazole (PROTONIX) 40 MG EC tablet Take 1 tablet (40 mg) by mouth daily before breakfast 90 tablet 3     polyethylene glycol (MIRALAX) 17 g packet Take 1 packet by mouth daily       predniSONE (DELTASONE) 5 MG tablet Take 1 tablet (5 mg) by mouth daily 90 tablet 3     rosuvastatin (CRESTOR) 5 MG tablet Take 1 tablet (5 mg) by mouth daily 90 tablet 3     senna-docusate (SENOKOT-S/PERICOLACE) 8.6-50 MG tablet Take 1 tablet by mouth 2 times daily as needed for constipation (Patient  not taking: Reported on 7/27/2021) 50 tablet 0     tamsulosin (FLOMAX) 0.4 MG capsule Take 1 capsule (0.4 mg) by mouth daily 90 capsule 3     vitamin B-12 (CYANOCOBALAMIN) 1000 MCG tablet Take 1 tablet (1,000 mcg) by mouth daily 30 tablet 11     vitamin D3 (CHOLECALCIFEROL) 50 mcg (2000 units) tablet Take 1 tablet (50 mcg) by mouth daily 90 tablet 3          Allergies   Allergen Reactions     Codeine Other (See Comments)     Cannot take due to liver  Cannot tolerate oral narcotics     Seasonal Allergies      Sneezing, coughing, runny and itchy eyes         Video physical exam  General: Patient appears well in no acute distress.   Skin: No visualized rash or lesions on visualized skin  Eyes: EOMI, no erythema, sclera icterus or discharge noted  Resp: Appears to be breathing comfortably without accessory muscle usage, speaking in full sentences, no cough  MSK: Appears to have normal range of motion based on visualized movements  Neurologic: No apparent tremors, facial movements symmetric  Psych: affect engaged, pleasant, alert and oriented    The rest of a comprehensive physical examination is deferred due to PHE (public health emergency) video restrictions     There were no vitals taken for this visit.  Wt Readings from Last 4 Encounters:   07/30/21 70.6 kg (155 lb 9.6 oz)   07/27/21 73.5 kg (162 lb)   07/22/21 73.5 kg (162 lb)   05/05/21 78.8 kg (173 lb 11.2 oz)         Labs:reviewed n  [facility]      Imaging:   CT CAP 9/2021                                                                     IMPRESSION:  1. In this patient with history of hepatocellular carcinoma status  post liver transplantation there is no definite evidence for disease  recurrence within the chest abdomen or pelvis.   2. 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  3. Similar appearance of sequelae of chronic portal hypertension such  as esophageal varices and splenomegaly.  4. New 3 mm pulmonary nodule within the subpleural  area adjacent to  the left upper lobe, attention on follow-up. Other sub-6 mm pulmonary  nodules are unchanged.       Impression/plan:   HCC, s/p liver transplantation on 1/11/2019  -We reviewed his imaging and labs. There is no evidence of recurrence. His pulmonary nodules are stable other than a very small new subpleural nodule that we will follow on serial imaging.   Will continue to follow with CT imaging in 6 months. Will f/u with Brenda Wilson at that time. He will continue close follow-up with Dr. Banuelos in hepatology and Dr. Moe for his diabetes care and primary health/routine health maintenance.    Patient interviewed and discussed with Dr. Cherelle Tucker MD  PGY6 Fellow. Hematology/Oncology/Transplantation    Attending note: I saw the patient in conjunction with the fellow and agree with the above.

## 2021-09-29 ENCOUNTER — HOSPITAL ENCOUNTER (OUTPATIENT)
Dept: CARDIAC REHAB | Facility: CLINIC | Age: 56
End: 2021-09-29
Attending: PHYSICIAN ASSISTANT
Payer: MEDICARE

## 2021-09-29 DIAGNOSIS — N18.30 STAGE 3 CHRONIC KIDNEY DISEASE, UNSPECIFIED WHETHER STAGE 3A OR 3B CKD (H): Primary | ICD-10-CM

## 2021-09-29 PROCEDURE — 93798 PHYS/QHP OP CAR RHAB W/ECG: CPT

## 2021-09-30 ENCOUNTER — VIRTUAL VISIT (OUTPATIENT)
Dept: BEHAVIORAL HEALTH | Facility: CLINIC | Age: 56
End: 2021-09-30
Payer: MEDICARE

## 2021-09-30 DIAGNOSIS — F31.70 BIPOLAR AFFECTIVE DISORDER IN REMISSION (H): Primary | ICD-10-CM

## 2021-09-30 DIAGNOSIS — F41.1 GENERALIZED ANXIETY DISORDER: ICD-10-CM

## 2021-09-30 PROCEDURE — 90834 PSYTX W PT 45 MINUTES: CPT | Mod: 95 | Performed by: PSYCHOLOGIST

## 2021-09-30 ASSESSMENT — ANXIETY QUESTIONNAIRES
GAD7 TOTAL SCORE: 10
3. WORRYING TOO MUCH ABOUT DIFFERENT THINGS: MORE THAN HALF THE DAYS
6. BECOMING EASILY ANNOYED OR IRRITABLE: SEVERAL DAYS
7. FEELING AFRAID AS IF SOMETHING AWFUL MIGHT HAPPEN: MORE THAN HALF THE DAYS
2. NOT BEING ABLE TO STOP OR CONTROL WORRYING: SEVERAL DAYS
5. BEING SO RESTLESS THAT IT IS HARD TO SIT STILL: MORE THAN HALF THE DAYS
1. FEELING NERVOUS, ANXIOUS, OR ON EDGE: SEVERAL DAYS

## 2021-09-30 ASSESSMENT — PATIENT HEALTH QUESTIONNAIRE - PHQ9
5. POOR APPETITE OR OVEREATING: SEVERAL DAYS
SUM OF ALL RESPONSES TO PHQ QUESTIONS 1-9: 10

## 2021-09-30 NOTE — PROGRESS NOTES
[facility]: Integrated Behavioral Health  September 30, 2021        Behavioral Health Clinician Progress Note    Patient Name: Frandy Workman           Service Type: Video visit      Service Location:  Video visit      Session Start Time: 1:30  Session End Time:  2:21      Session Length: 38 - 52      Attendees: Patient    Visit Activities (Refresh list every visit): [facility] Only     Telemedicine Visit: The patient's condition can be safely assessed and treated via synchronous audio and visual telemedicine encounter.      Reason for Telemedicine Visit: COVID-19    Originating Site (Patient Location): Patient's home    Distant Site (Provider Location): Provider Remote Setting    Consent:  The patient/guardian has verbally consented to: the potential risks and benefits of telemedicine (video visit) versus in person care; bill my insurance or make self-payment for services provided; and responsibility for payment of non-covered services.     Mode of Communication:  Video Conference via gridComm    As the provider I attest to compliance with applicable laws and regulations related to telemedicine.    Diagnostic Assessment Date: 12/03/2020  Treatment Plan Review Date:  11/2/2021  See Flowsheets for today's PHQ-9 and LIZZETTE-7 results  Previous PHQ-9:   PHQ-9 SCORE 12/29/2020 4/7/2021 9/30/2021   PHQ-9 Total Score MyChart 5 (Mild depression) 7 (Mild depression) -   PHQ-9 Total Score 5 7 10     Previous LIZZETTE-7:   LIZZETTE-7 SCORE 10/13/2020 12/29/2020 4/7/2021   Total Score 6 (mild anxiety) 8 (mild anxiety) 9 (mild anxiety)   Total Score 6 8 9        Social Connections: Socially Isolated     Frequency of Communication with Friends and Family: Once a week     Frequency of Social Gatherings with Friends and Family: Once a week     Attends Zoroastrianism Services: Never     Active Member of Clubs or Organizations: No     Attends Club or Organization Meetings: Never     Marital Status:      "  DATA  Extended Session (60+ minutes): No  Interactive Complexity: No  Crisis: No    Treatment Objective(s) Addressed in This Session:  Target Behavior(s): disease management/lifestyle changes        Current Stressors / Issues:  [facility] met with Miller today over telemedicine to continue supporting his treatment of mood difficulties and adjusting to chronic disease problems. Miller provided a few updates about his health today. He is participating in cardiac rehabilitation a few times per week and his hemoglobin levels are improving. He reports continuing to struggle with low energy and is sleeping more than he would prefer, however. Miller reports he is also working on his organization, paying bills and generally keeping up with daily tasks. He states that despite the steps he takes, he notices his mood feels off. He states having trouble figuring out how he feels exactly, noting he does not feel depressed per se, though is not \"feeling like himself.\" We explored his thoughts and feelings about this; this writer helped Miller label his emotional experiences more specifically. We filled out PHQ-9 and LIZZETTE-7 together to evaluate his current report of mood difficulties to help us treatment plan. These both came back in moderate ranges.    We agreed that anxiety seems to be a more difficult experience for him right now. Discussed the nature of his health related anxiety in particular. Miller reported he has concerns about his health quite often. He worries about doing too much physical activity out of fear he might experience a heart attack or other health complication. Discussed how his health anxiety holds him back from doing things he might enjoy or activities that help him cope with stress. Discussed how his situation with being very involved in his medical care is a good thing, but can contribute to this anxious distress at times. Explored ways Miller could incorporate more moments where he is not focused on his health through his " thinking and behaviors. We emphasized social support for him, how engaging with others in non medical contexts might be ideal for him for managing his anxiety. Discussed options of Jew or being in more contact with family he speaks with.       Progress on Treatment Objective(s) / Homework:  Satisfactory progress - ACTION (Actively working towards change); Intervened by reinforcing change plan / affirming steps taken      Solution-Focused Therapy    Explore patterns in patient's relationships and discuss options for new behaviors.    Explore patterns in patient's actions and choices and discuss options for new behaviors.    Behavioral Activation    Discuss steps patient can take to become more involved in meaningful activity.    Identify barriers to these activities and explore possible solutions.      Answers for HPI/ROS submitted by the patient on 4/7/2021   LIZZETTE 7 TOTAL SCORE: 9  If you checked off any problems, how difficult have these problems made it for you to do your work, take care of things at home, or get along with other people?: Very difficult  PHQ9 TOTAL SCORE: 7*    *No SI    Answers for HPI/ROS submitted by the patient on 10/13/2020   If you checked off any problems, how difficult have these problems made it for you to do your work, take care of things at home, or get along with other people?: Somewhat difficult  PHQ9 TOTAL SCORE: 8*  LIZZETTE 7 TOTAL SCORE: 6      *No SI     Answers for HPI/ROS submitted by the patient on 12/29/2020   If you checked off any problems, how difficult have these problems made it for you to do your work, take care of things at home, or get along with other people?: Somewhat difficult  PHQ9 TOTAL SCORE: 5*  LIZZETTE 7 TOTAL SCORE: 8    *No SI       Care Plan review completed: yes    Medication Review:  No changes to current psychiatric medication(s)     Current Outpatient Medications   Medication     Acetaminophen (TYLENOL) 325 MG CAPS     ARIPiprazole (ABILIFY) 5 MG tablet      Artificial Tear Solution (SM ARTIFICIAL TEARS) SOLN     aspirin (SM ASPIRIN ADULT LOW STRENGTH) 81 MG EC tablet     BD VIKTORIA U/F 32G X 4 MM insulin pen needle     ciclopirox (LOPROX) 0.77 % cream     Continuous Blood Gluc  (FREESTYLE CLAUDIA 14 DAY READER) CECILIO     Continuous Blood Gluc Sensor (FREESTYLE CLAUDIA 14 DAY SENSOR) MISC     insulin aspart (NOVOLOG PEN) 100 UNIT/ML pen     insulin aspart (NOVOLOG PEN) 100 UNIT/ML pen     lamiVUDine (EPIVIR) 100 MG tablet     methocarbamol (ROBAXIN) 750 MG tablet     metoprolol succinate ER (TOPROL-XL) 25 MG 24 hr tablet     Multiple Vitamin (TAB-A-GLADIS) TABS     Multiple Vitamin (THERAVITE PO)     mycophenolate (GENERIC EQUIVALENT) 250 MG capsule     order for DME     oxyCODONE (ROXICODONE) 5 MG tablet     pantoprazole (PROTONIX) 40 MG EC tablet     polyethylene glycol (MIRALAX) 17 g packet     predniSONE (DELTASONE) 5 MG tablet     rosuvastatin (CRESTOR) 5 MG tablet     senna-docusate (SENOKOT-S/PERICOLACE) 8.6-50 MG tablet     tamsulosin (FLOMAX) 0.4 MG capsule     vitamin B-12 (CYANOCOBALAMIN) 1000 MCG tablet     vitamin D3 (CHOLECALCIFEROL) 50 mcg (2000 units) tablet     Current Facility-Administered Medications   Medication     aflibercept (EYLEA) injection prefilled syringe 2 mg     aflibercept (EYLEA) injection prefilled syringe 2 mg     Facility-Administered Medications Ordered in Other Visits   Medication     darbepoetin elias-polysorbate (ARANESP) injection 60 mcg         Medication Compliance:  Yes    Changes in Health Issues:   None reported    Chemical Use Review:   Substance Use: Chemical use reviewed, no active concerns identified      Tobacco Use: No current tobacco use.         Assessment: Current Emotional / Mental Status (status of significant symptoms):  Risk status (Self / Other harm or suicidal ideation)  Patient denies a history of suicidal ideation, suicide attempts, self-injurious behavior, homicidal ideation, homicidal behavior and and other  safety concerns     Patient denies current fears or concerns for personal safety.  Patient denies current or recent suicidal ideation or behaviors.  Patient denies current or recent homicidal ideation or behaviors.  Patient denies current or recent self injurious behavior or ideation.  Patient denies other safety concerns.     A safety and risk management plan has not been developed at this time, however patient was encouraged to call [place] [phone] should there be a change in any of these risk factors.    [place] Suicide Severity Rating Scale (Short Version)  [place] Suicide Severity Rating (Short Version) 1/24/2019 11/10/2019 12/2/2019 12/10/2019 6/11/2020 7/1/2020 7/12/2021   Over the past 2 weeks have you felt down, depressed, or hopeless? - no yes yes no no no   Over the past 2 weeks have you had thoughts of killing yourself? - no yes no no no no   Comments - - lots of stressors, has thought about not taking meds - - - -   Have you ever attempted to kill yourself? - no no no no no no   Q1 Wished to be Dead (Past Month) no - other (see comments) no - - -   Comments - - why did i do this surgery - - - -   Q2 Suicidal Thoughts (Past Month) no - no no - - -   Comments - - wishes he wouldn't have gone through surgery - - - -   Q3 Suicidal Thought Method - - no no - - -   Q4 Suicidal Intent without Specific Plan - - no no - - -   Q5 Suicide Intent with Specific Plan - - no no - - -   Q6 Suicide Behavior (Lifetime) no - no no - - -         Appearance:   Appropriate   Eye Contact:   Good   Psychomotor Behavior: Restless   Attitude:   Cooperative  Interested Friendly Pleasant  Orientation:   All  Speech   Rate / Production: Normal/ Responsive   Volume:  Normal   Mood:    Normal  Affect:    Appropriate    Thought Content:  Clear   Thought Form:  Goal Directed  Logical   Insight:    Good     Diagnoses:  1. Bipolar affective disorder in remission (H)    2. Generalized anxiety disorder          Collateral Reports  Completed:  Not Applicable    Plan: (Homework, other):  Continue with this writer for individual psychotherapy in three weeks. He will work on managing anxiety through adaptive behavior change, like tolerable/safe exercise/movement and engaging with social support contacts.     Joseph Murillo LP  September 30, 2021            ______________________________________________________________________    [facility] Integrated [facility]al Health Treatment Plan    Client's Name: Frandy Workman  YOB: 1965    Date: August 2, 2021      DSM-V Diagnoses: 296.51 Bipolar I Disorder Current or Most Recent Episode Depressed, Mild;     Clinical Global Impressions  First:  Considering your total clinical experience with this particular patient population, how severe are the patient's symptoms at this time?: 2 (8/2/2021 10:26 AM)      Most recent:  Compared to the patient's condition at the START of treatment, this patient's condition is: 2 (12/18/2020  2:22 PM)        Referral / Collaboration:  Will collaborate with care team as indicated during treatment.    Anticipated number of session or this episode of care: 10+      MeasurableTreatment Goal(s) related to diagnosis / functional impairment(s)  Goal 1:  Patient will experience a reduction in depressive and anxious symptoms, along with a corresponding increase in positive emotion and life satisfaction.    Objective #A: Patient will experience a reduction in depressed mood, will develop more effective coping skills to manage depressive symptoms, will develop healthy cognitive patterns and beliefs, will increase ability to function adaptively and will continue to take medications as prescribed / participate in supportive activities and services    Status: Continued - Date(s): August 2, 2021    Objective #B: Patient will experience a reduction in anxiety, will develop more effective coping skills to manage anxiety symptoms, will develop healthy cognitive patterns and beliefs  and will increase ability to function adaptively  Status: Continued - Date(s): August 2, 2021    Objective #C: Patient will develop better understanding of triggers and coping strategies to stabilize mood  Status: Continued - Date(s): August 2, 2021    Goal 2:  Patient will identify and increase engagement in valued activity, i.e. improving social connections/relationships, pursuing occupational goals or personally meaningful pursuits, exploration of meaning in life.     Objective #A: Patient will identify meaningful activity in social, occupational and  personal goals, and increase behavioral activation around these goals   Status: Continued - Date(s): August 2, 2021    Objective #B: Patient will address relationship difficulties in a more adaptive manner  Status: Continued - Date(s): August 2, 2021    Objective #C: Patient will develop coping/problem-solving skills to facilitate more adaptive adjustment and will effectively address problems that interfere with adaptive functioning  Status: Continued - Date(s): August 2, 2021        Possible Therapeutic Intervention(s)  Psycho-education regarding mental health diagnoses and treatment options    Skills training    Explore skills useful to client in current situation.    Skills include assertiveness, communication, conflict management, problem-solving, relaxation, etc.    Solution-Focused Therapy    Explore patterns in patient's relationships and discuss options for new behaviors.    Explore patterns in patient's actions and choices and discuss options for new behaviors.    Cognitive-behavioral Therapy    Discuss common cognitive distortions, identify them in patient's life.    Explore ways to challenge, replace, and act against these cognitions.    Acceptance and Commitment Therapy    Explore and identify important values in patient's life.    Discuss ways to commit to behavioral activation around these values.    Psychodynamic psychotherapy    Discuss patient's  emotional dynamics and issues and how they impact behaviors.    Explore patient's history of relationships and how they impact present behaviors.    Explore how to work with and make changes in these schemas and patterns.    Narrative Therapy    Explore the patient's story of his/her life from his/her perspective.    Explore alternate ways of understanding their experience, identifying exceptions, developing new themes.    Interpersonal Psychotherapy    Explore patterns in relationships that are effective or ineffective at helping patient reach their goals, find satisfying experience.    Discuss new patterns or behaviors to engage in for improved social functioning.    Behavioral Activation    Discuss steps patient can take to become more involved in meaningful activity.    Identify barriers to these activities and explore possible solutions.    Mindfulness-Based Strategies    Discuss skills based on development and application of mindfulness.    Skills drawn from compassion-focused therapy, dialectical behavior therapy, mindfulness-based stress reduction, mindfulness-based cognitive therapy, etc.      We have developed these goals together during our work to this point. Patient has assisted in the development of these goals and has agreed to this treatment plan.       Joseph Murillo LP  August 2, 2021

## 2021-10-01 ENCOUNTER — HOSPITAL ENCOUNTER (OUTPATIENT)
Dept: CARDIAC REHAB | Facility: CLINIC | Age: 56
End: 2021-10-01
Attending: PHYSICIAN ASSISTANT
Payer: MEDICARE

## 2021-10-01 PROCEDURE — 93798 PHYS/QHP OP CAR RHAB W/ECG: CPT

## 2021-10-01 ASSESSMENT — ANXIETY QUESTIONNAIRES: GAD7 TOTAL SCORE: 10

## 2021-10-04 ENCOUNTER — LAB (OUTPATIENT)
Dept: LAB | Facility: CLINIC | Age: 56
End: 2021-10-04
Payer: MEDICARE

## 2021-10-04 ENCOUNTER — OFFICE VISIT (OUTPATIENT)
Dept: NEPHROLOGY | Facility: CLINIC | Age: 56
End: 2021-10-04
Attending: INTERNAL MEDICINE
Payer: MEDICARE

## 2021-10-04 VITALS
BODY MASS INDEX: 25.33 KG/M2 | SYSTOLIC BLOOD PRESSURE: 137 MMHG | TEMPERATURE: 98 F | HEIGHT: 69 IN | WEIGHT: 171 LBS | OXYGEN SATURATION: 100 % | DIASTOLIC BLOOD PRESSURE: 80 MMHG | HEART RATE: 84 BPM

## 2021-10-04 DIAGNOSIS — R80.1 PERSISTENT PROTEINURIA: ICD-10-CM

## 2021-10-04 DIAGNOSIS — N18.30 STAGE 3 CHRONIC KIDNEY DISEASE, UNSPECIFIED WHETHER STAGE 3A OR 3B CKD (H): ICD-10-CM

## 2021-10-04 DIAGNOSIS — I50.20 HFREF (HEART FAILURE WITH REDUCED EJECTION FRACTION) (H): ICD-10-CM

## 2021-10-04 DIAGNOSIS — E11.3293 TYPE 2 DIABETES MELLITUS WITH MILD NONPROLIFERATIVE RETINOPATHY OF BOTH EYES WITHOUT MACULAR EDEMA, UNSPECIFIED WHETHER LONG TERM INSULIN USE (H): Primary | ICD-10-CM

## 2021-10-04 DIAGNOSIS — N18.32 STAGE 3B CHRONIC KIDNEY DISEASE (H): ICD-10-CM

## 2021-10-04 DIAGNOSIS — R63.5 WEIGHT GAIN: ICD-10-CM

## 2021-10-04 LAB
ALBUMIN SERPL-MCNC: 4 G/DL (ref 3.4–5)
ANION GAP SERPL CALCULATED.3IONS-SCNC: 3 MMOL/L (ref 3–14)
BUN SERPL-MCNC: 23 MG/DL (ref 7–30)
CALCIUM SERPL-MCNC: 8.7 MG/DL (ref 8.5–10.1)
CHLORIDE BLD-SCNC: 110 MMOL/L (ref 94–109)
CO2 SERPL-SCNC: 28 MMOL/L (ref 20–32)
CREAT SERPL-MCNC: 1.26 MG/DL (ref 0.66–1.25)
CREAT UR-MCNC: 94 MG/DL
CREAT UR-MCNC: 94 MG/DL
ERYTHROCYTE [DISTWIDTH] IN BLOOD BY AUTOMATED COUNT: 15.7 % (ref 10–15)
GFR SERPL CREATININE-BSD FRML MDRD: 63 ML/MIN/1.73M2
GLUCOSE BLD-MCNC: 132 MG/DL (ref 70–99)
HCT VFR BLD AUTO: 33.1 % (ref 40–53)
HGB BLD-MCNC: 10.4 G/DL (ref 13.3–17.7)
MCH RBC QN AUTO: 31.6 PG (ref 26.5–33)
MCHC RBC AUTO-ENTMCNC: 31.4 G/DL (ref 31.5–36.5)
MCV RBC AUTO: 101 FL (ref 78–100)
MICROALBUMIN UR-MCNC: 1020 MG/L
MICROALBUMIN/CREAT UR: 1085.11 MG/G CR (ref 0–17)
PHOSPHATE SERPL-MCNC: 3.2 MG/DL (ref 2.5–4.5)
PLATELET # BLD AUTO: 151 10E3/UL (ref 150–450)
POTASSIUM BLD-SCNC: 4.2 MMOL/L (ref 3.4–5.3)
PROT UR-MCNC: 1.37 G/L
PROT/CREAT 24H UR: 1.46 G/G CR (ref 0–0.2)
PTH-INTACT SERPL-MCNC: 81 PG/ML (ref 18–80)
RBC # BLD AUTO: 3.29 10E6/UL (ref 4.4–5.9)
SODIUM SERPL-SCNC: 141 MMOL/L (ref 133–144)
TSH SERPL DL<=0.005 MIU/L-ACNC: 1.9 MU/L (ref 0.4–4)
WBC # BLD AUTO: 4.9 10E3/UL (ref 4–11)

## 2021-10-04 PROCEDURE — 80069 RENAL FUNCTION PANEL: CPT | Performed by: PATHOLOGY

## 2021-10-04 PROCEDURE — 83970 ASSAY OF PARATHORMONE: CPT | Performed by: INTERNAL MEDICINE

## 2021-10-04 PROCEDURE — 84443 ASSAY THYROID STIM HORMONE: CPT | Performed by: INTERNAL MEDICINE

## 2021-10-04 PROCEDURE — G0463 HOSPITAL OUTPT CLINIC VISIT: HCPCS

## 2021-10-04 PROCEDURE — 85027 COMPLETE CBC AUTOMATED: CPT | Performed by: PATHOLOGY

## 2021-10-04 PROCEDURE — 82306 VITAMIN D 25 HYDROXY: CPT | Performed by: INTERNAL MEDICINE

## 2021-10-04 PROCEDURE — 84156 ASSAY OF PROTEIN URINE: CPT | Performed by: PATHOLOGY

## 2021-10-04 PROCEDURE — 99214 OFFICE O/P EST MOD 30 MIN: CPT | Performed by: INTERNAL MEDICINE

## 2021-10-04 PROCEDURE — 36415 COLL VENOUS BLD VENIPUNCTURE: CPT | Performed by: PATHOLOGY

## 2021-10-04 PROCEDURE — 82043 UR ALBUMIN QUANTITATIVE: CPT | Performed by: PATHOLOGY

## 2021-10-04 RX ORDER — LISINOPRIL 5 MG/1
5 TABLET ORAL DAILY
Qty: 30 TABLET | Refills: 11 | Status: SHIPPED | OUTPATIENT
Start: 2021-10-04 | End: 2022-07-13

## 2021-10-04 ASSESSMENT — MIFFLIN-ST. JEOR: SCORE: 1591.03

## 2021-10-04 ASSESSMENT — PAIN SCALES - GENERAL: PAINLEVEL: NO PAIN (0)

## 2021-10-04 NOTE — NURSING NOTE
"Chief Complaint   Patient presents with     RECHECK     chronic kidney disease stage 3     /80   Pulse 84   Temp 98  F (36.7  C) (Oral)   Ht 1.753 m (5' 9\")   Wt 77.6 kg (171 lb)   SpO2 100%   BMI 25.25 kg/m     Amita Erazo MA  "

## 2021-10-04 NOTE — LETTER
10/4/2021      RE: Frandy Workman  [address] [facility] [address]         Nephrology Clinic   Eloy Rao MD  10/04/2021     Name: Frandy Workman  MRN: [id number]  Age: 56 year old  : 1965  Referring provider: Natalie Russell     Assessment and Plan:  1. CKD, stage 3b (A3): Prior to recent liver transplant baseline Cr ~1.1 mg/dL with eGFR of 75 ml/min. Post-transplant creatinine is  ~1.6 mg/dL (eGFR of 40 ml/min) and remains stable (even better today which may relate to recent loss of muscle mass during hospitalization during which he underwent a CABG and discontinuation of tacrolimus).  I suspect he likely has some degree of diabetic changes further complicated by chronic changes from past lithium use (for 15 years) and further complicated by tacrolimus toxicity.  He is at risk of progressive CKD due diabetes.  As mentioned, he is now off of tacrolimus and continues on MMF and prednisone for his liver transplant.  He does have worsening albuminuria (1085 mg/g which may be thre result of discontinuation of his SGLT2 inhibitor.    -will restart RAAS blockade with lisinopril at a low dose of 5 mg daily with goal of slowing progeession of CKD and minimizing albuminuria  -will also restart empagliflozin but at a lower dose of 10 mg daiy with the goal of minimizing albuminuria, slowing CKD progression, and minimizing cardiovascular events given his recent CABG and ischemic HFrEF (45-50% by TTE In )     -he will monitor his JERILYN and blood glucose daily and report to clinc as wwe may need to back off his BP meds and/or insulin given the changes made above (initiation of lisinopril and empagliflozin)     -RTC in 6 months     2. HTN/Volume status: Euvolemic. Recently hypotensive but clinic BP aobve goal today of at least <130/80.    -he will continue metoprolol XL at 12.5 mg daily  -as mentioned we will also start RAAS blockade with lisinopril at 5 mg daily (see problem  1)  -he may also have paula reduced BP after restarting empagliflozin (see problem 1)  -he will measure his BP daily and report to clinic for further adjustments.       3.  Electrolytes:  Potassium on higher side in past.  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 It appears improved after discontinuation of tacrolimus.       4. Anemia: Possibly related to Imuran in the past, which has been discontinued.  Iron stores are replete.  Anemia of chronic disease and renal insufficiency also likely contributing.  He did IVELISSE treatment and hemoglobin improved to 13.9 in the past.  IVELISSE has been restarted though ideally can be discontinued soon as hgb 10.4 and will ideally continue to improve without need for prolonged intervention.    -he will continue to follow in the anemia management clinic     Follow-up: RTC in 6 months     Reason For Visit:   CKD    HPI:   Frandy Workman is a 56 year old man who presents for CKD f/u.  His past medical history is remarkable for liver transplant (November, 2019) for ESTRADA cirrhosis (complicated by ascites and hepatic encephalopathy on lactulose and rifaximin in the past), hepatocellular carcinoma (s/p TACE and microwave ablation 01/2019), long standing DM 2 (complicated by nonproliferative diabetic retinopathy, macular edema and peripheral neuropathy), bipolar disorder (previously on lithium for 15 years), HTN, hyperlipidemia and CAD by angiography not requiring PCI.      He denies excessive use of NSAIDs in the past.  He had no history of nephrolithiasis or frequent UTIs.  He has no significant family history of CKD.     In terms of CKD, he appeared to have a baseline of ~1.1 mg/dL prior to his liver transplant in November 2019.  Creatinine after transplant was ~1.3 mg/dL at time of discharge and vikram to 3.2 mg/dL thought to be prerenal from volume depletion.  He was admitted for IVF and creatinine improved to 1.5  mg/dL at time of discharge.  His serum Cr now seems to fluctuate around 1.8 mg/dL with an eGFR of 40 ml/min.  He continues on tacrolimus for his liver transplant.  He no longer is on lasix for management of edema.  He has not required IVELISSE therapy in several months (last August, 2020).  He recently was started on low dose carvedilol for management of hypertension.  He also recently started empagliflozin for management of diabetes. He reports weight gain due to lack of exercise during the COVID pandemic.  His main complaint today is that of pain near his incisional scars for his liver transplant for which he is following up with hepatology.  His BP this morning was 129/84.      Interval History:  Overall, Mr. Workman feels well.  In July, 2021 he had unstble angina in the setting of severe anemia and eventually was noted to have a NSTEMI and underwent a CABG.  He was started on lisinopril and metoprolol but lisinopril has since been discontinued due to hypotension. His empagliflozin was discontinued during his recent hospitalization. He is undergoing cardiac rehab and has recovered well.  He has no major medical complaints today.        Review of Systems:   Pertinent items are noted in HPI or as below, remainder of complete ROS is negative.      Active Medications:   Current Outpatient Medications   Medication     Acetaminophen (TYLENOL) 325 MG CAPS     ARIPiprazole (ABILIFY) 5 MG tablet     aspirin (SM ASPIRIN ADULT LOW STRENGTH) 81 MG EC tablet     BD VIKTORIA U/F 32G X 4 MM insulin pen needle     ciclopirox (LOPROX) 0.77 % cream     Continuous Blood Gluc  (FREESTYLE CLAUDIA 14 DAY READER) CECILIO     Continuous Blood Gluc Sensor (FREESTYLE CLAUDIA 14 DAY SENSOR) MISC     insulin aspart (NOVOLOG PEN) 100 UNIT/ML pen     lamiVUDine (EPIVIR) 100 MG tablet     metoprolol succinate ER (TOPROL-XL) 25 MG 24 hr tablet     Multiple Vitamin (TAB-A-GLADIS) TABS     mycophenolate (GENERIC EQUIVALENT) 250 MG capsule     order for  DME     oxyCODONE (ROXICODONE) 5 MG tablet     pantoprazole (PROTONIX) 40 MG EC tablet     polyethylene glycol (MIRALAX) 17 g packet     predniSONE (DELTASONE) 5 MG tablet     rosuvastatin (CRESTOR) 5 MG tablet     tamsulosin (FLOMAX) 0.4 MG capsule     vitamin B-12 (CYANOCOBALAMIN) 1000 MCG tablet     vitamin D3 (CHOLECALCIFEROL) 50 mcg (2000 units) tablet     Artificial Tear Solution (SM ARTIFICIAL TEARS) SOLN     insulin aspart (NOVOLOG PEN) 100 UNIT/ML pen     methocarbamol (ROBAXIN) 750 MG tablet     Multiple Vitamin (THERAVITE PO)     senna-docusate (SENOKOT-S/PERICOLACE) 8.6-50 MG tablet     Current Facility-Administered Medications   Medication     aflibercept (EYLEA) injection prefilled syringe 2 mg     aflibercept (EYLEA) injection prefilled syringe 2 mg     Facility-Administered Medications Ordered in Other Visits   Medication     darbepoetin elias-polysorbate (ARANESP) injection 60 mcg        Allergies:   Codeine and Seasonal allergies      Past Medical History:  Chronic kidney disease, stage 3  Type 2 diabetes mellitus  Bipolar affective disorder   Brow ptosis  Hepatocellular carcinoma  Coronary artery disease s/p CABG  Hypertension   Anemia   Anxiety   Mild recurrent major depression  Mild nonproliferative retinopathy  Gastroesophageal reflux disease   Cholelithiasis   Benign prostatic hypertrophy   Portal vein thrombosis      Past Surgical History:  Liver transplant recipient   Coronary catheterization  Parotidectomy, radical neck dissection  Right eyelid weight placement  Orthopedic surgery    Family History:   Prostate cancer - maternal grandfather, father   Substance abuse - maternal grandfather, maternal grandmother   Colon cancer - father, paternal grandmother  Cancer - mother  Thyroid disease - mother, sister   Stroke - mother  Depression - mother, sister  Asthma - sister      Social History:   Presents to clinic alone  Tobacco Use: Former smoker, 180 pack year history, quit 2017.   Alcohol Use:  "quit September 1996  PCP: Nerissa Moe      Physical Exam:  /80   Pulse 84   Temp 98  F (36.7  C) (Oral)   Ht 1.753 m (5' 9\")   Wt 77.6 kg (171 lb)   SpO2 100%   BMI 25.25 kg/m     Physical exam deferred as encounter was a telephone visit.      Laboratory:  CMP  Recent Labs   Lab Test 10/04/21  0802 09/24/21  1000 09/24/21  0912 08/23/21  0945 08/09/21  1022 08/09/21  1022 07/26/21  1055 07/26/21  1055 07/21/21  1057 07/21/21  0617 07/20/21  2233 07/20/21  2009 07/20/21  0749 07/20/21  0609 07/12/21  1036 06/28/21  1347 06/14/21  1322 06/14/21  1322 06/04/21  1236 06/04/21  1236 05/05/21  0909 05/05/21  0909    139  --  141  --  136   < > 137   < > 136  --   --    < > 140   < > 137   < > 139   < > 138   < > 139   POTASSIUM 4.2 4.6  --  4.1  --  4.4   < > 5.0   < > 4.1  --   --    < > 4.2   < > 4.6   < > 4.7   < > 4.6   < > 4.5   CHLORIDE 110* 105  --  107  --  105   < > 106   < > 104  --   --    < > 107   < > 107   < > 106   < > 106   < > 107   CO2 28 27  --  27  --  24   < > 20   < > 23  --   --    < > 26   < > 25   < > 26   < > 26   < > 26   ANIONGAP 3 7  --  7  --  7   < > 11   < > 9  --   --    < > 7   < > 5   < > 7   < > 6   < > 6   * 140*  --  169*  --  283*   < > 207*   < > 170*   < >  --    < > 135*   < > 208*   < > 173*   < > 156*   < > 258*   BUN 23 33*  --  24  --  24   < > 30   < > 18  --   --    < > 22   < > 33*   < > 29   < > 39*   < > 38*   CR 1.26* 1.30* 1.4* 1.31*   < > 1.50*   < > 1.31*   < > 1.03  --   --    < > 1.08   < > 1.62*   < > 1.54*   < > 1.64*   < > 1.52*   GFRESTIMATED 63 61 55* 60*   < > 51*   < > 60*   < > 80  --   --    < > 76   < > 46*   < > 49*   < > 46*   < > 50*   GFRESTBLACK  --   --   --   --   --   --   --   --   --   --   --   --   --   --   --  54*  --  57*  --  53*  --  58*   DEBORAH 8.7 9.2  --  9.4  --  9.4   < > 9.8   < > 7.1*  --   --    < > 7.6*   < > 9.1   < > 9.8   < > 10.2*   < > 9.6   MAG  --  2.0  --   --   --  1.8  --   --   --  2.7*  --  "  --   --  2.8*   < >  --   --   --   --  2.2  --   --    PHOS 3.2  --   --   --   --   --   --   --   --  3.1  --  1.8*  --  2.5   < >  --   --   --   --   --   --   --    PROTTOTAL  --  7.3  --  7.1  --  7.2  --  7.4   < > 6.2*  --   --    < > 6.2*   < > 7.4   < > 7.8   < > 7.8   < > 7.8   ALBUMIN 4.0 4.0  --  3.7  --  3.7   < > 3.4   < > 2.7*  --   --    < > 2.6*   < > 4.0   < > 4.1   < > 4.2   < > 4.2   BILITOTAL  --  0.6  --  0.6  --  0.4  --  0.7   < > 0.5  --   --    < > 0.4   < > 0.5   < > 0.6   < > 0.5   < > 0.5   ALKPHOS  --  78  --  121  --  118  --  121   < > 126  --   --    < > 147   < > 98   < > 106   < > 108   < > 112   AST  --  6  --  7  --  5  --  12   < > 12  --   --    < > 12   < > 9   < > 8   < > 10   < > 13   ALT  --  17  --  16  --  15  --  19   < > 17  --   --    < > 19   < > 20   < > 21   < > 26   < > 28    < > = values in this interval not displayed.     CBC  Recent Labs   Lab Test 10/04/21  0802 09/24/21  1000 09/21/21  1026 09/07/21  1025 08/23/21  0945 08/23/21  0945 08/09/21  1022 08/09/21  1022   HGB 10.4* 9.8* 9.6* 8.7*   < > 7.0*   < > 8.2*   WBC 4.9 4.8  --   --   --  4.7  --  4.8   RBC 3.29* 3.10*  --   --   --  2.28*  --  2.74*   HCT 33.1* 31.3* 31.8* 28.0*   < > 22.8*   < > 26.6*   * 101*  --   --   --  100  --  97   MCH 31.6 31.6  --   --   --  30.7  --  29.9   MCHC 31.4* 31.3*  --   --   --  30.7*  --  30.8*   RDW 15.7* 16.0*  --   --   --  20.0*  --  20.1*    149*  --   --   --  171  --  211    < > = values in this interval not displayed.     INR  Recent Labs   Lab Test 07/14/21  1351 07/14/21  1215 07/12/21  1608 01/24/20  0837 11/12/19  1645 11/12/19  1400 11/12/19  0950 11/12/19  0346   INR 1.28* 1.45* 0.96 1.09   < > 1.46*   < > 1.47*   PTT 46* 37  --   --   --  39*  --  57*    < > = values in this interval not displayed.     ABG  Recent Labs   Lab Test 07/15/21  0425 07/14/21  2049 07/14/21  1605 07/14/21  1352 07/14/21  1351 07/14/21  1351 07/14/21  1252  07/14/21  1216 07/14/21  1216   PH  --   --  7.37  --   --  7.29* 7.36  --  7.38   PCO2  --   --  37  --   --  44 35  --  36   PO2  --   --  87  --   --  145* 181*  --  306*   HCO3  --   --  21  --   --  21 20*  --  21   O2PER 21 21 21 5   < > 5 50.0   < > 75.0    < > = values in this interval not displayed.      URINE STUDIES  Recent Labs   Lab Test 09/07/21  1115 12/04/19  1400 11/15/19  1123 07/08/19  1017   COLOR Yellow Light Yellow Light Yellow Yellow   APPEARANCE Cloudy* Clear Clear Clear   URINEGLC 50 * 30* 300* >499*   URINEBILI Negative Negative Negative Negative   URINEKETONE Negative Negative Negative Negative   SG 1.022 1.009 1.007 1.017   UBLD Small* Negative Small* Negative   URINEPH 5.0 5.0 6.5 5.0   PROTEIN >499* 30* Negative Negative   NITRITE Negative Negative Negative Negative   LEUKEST Negative Negative Negative Negative   RBCU 1 0 0 <1   WBCU 1 1 <1 3     Recent Labs   Lab Test 10/04/21  0804 09/07/21  1115 02/26/21  0750 06/29/20  1008 03/02/20  1013 12/02/19  1040 07/08/19  1017 02/04/19  0705   UTPG 1.46* 1.17* 0.47* 0.89* 0.29* 1.22* 0.11 0.14     PTH  Recent Labs   Lab Test 06/29/20  1005 07/08/19  1020   PTHI 61 54     IRON STUDIES   Recent Labs   Lab Test 07/12/21  1608 06/28/21  1347 06/04/21  1236 12/02/20  0739 11/11/20  0754 10/14/20  0836 09/16/20  0802 07/29/20  0749 06/29/20  1005 05/21/20  0723 04/22/20  0833 03/09/20  0823 01/27/20  1340 12/02/19  1034 12/28/18  1108 09/15/18  1215 06/09/17  1250   IRON  --  112 158 75 81 73 93 87 60 72 65 92  --  88  --  52  --    FEB  --  226* 270 228* 227* 248 221* 230* 204* 192* 215* 231*  --  248  --  403  --    IRONSAT  --  50* 59* 33 36 29 42 38 29 38 30 40  --  36  --  13*  --    QUAN 543* 495* 399* 433* 286 323 223 193 352 344 643* 859* 690* 1,353* 90 10* 450*       All above labs reviewed by me.   Eloy Rao MD   [phone]      Eloy Rao MD

## 2021-10-04 NOTE — LETTER
10/4/2021       RE: Frandy Workman  [address] [facility] [address]     Dear Colleague,    Thank you for referring your patient, Frandy Workman, to the [facility] [place] at [facility]. Please see a copy of my visit note below.      Nephrology Clinic   Eloy Rao MD  10/04/2021     Name: Frandy Workman  MRN: [id number]  Age: 56 year old  : 1965  Referring provider: Natalie Russell     Assessment and Plan:  1. CKD, stage 3b (A3): Prior to recent liver transplant baseline Cr ~1.1 mg/dL with eGFR of 75 ml/min. Post-transplant creatinine is  ~1.6 mg/dL (eGFR of 40 ml/min) and remains stable (even better today which may relate to recent loss of muscle mass during hospitalization during which he underwent a CABG and discontinuation of tacrolimus).  I suspect he likely has some degree of diabetic changes further complicated by chronic changes from past lithium use (for 15 years) and further complicated by tacrolimus toxicity.  He is at risk of progressive CKD due diabetes.  As mentioned, he is now off of tacrolimus and continues on MMF and prednisone for his liver transplant.  He does have worsening albuminuria (1085 mg/g which may be thre result of discontinuation of his SGLT2 inhibitor.    -will restart RAAS blockade with lisinopril at a low dose of 5 mg daily with goal of slowing progeession of CKD and minimizing albuminuria  -will also restart empagliflozin but at a lower dose of 10 mg daiy with the goal of minimizing albuminuria, slowing CKD progression, and minimizing cardiovascular events given his recent CABG and ischemic HFrEF (45-50% by TTE In )     -he will monitor his JERILYN and blood glucose daily and report to clinc as wwe may need to back off his BP meds and/or insulin given the changes made above (initiation of lisinopril and empagliflozin)     -RTC in 6 months     2. HTN/Volume  status: Euvolemic. Recently hypotensive but clinic BP aobve goal today of at least <130/80.    -he will continue metoprolol XL at 12.5 mg daily  -as mentioned we will also start RAAS blockade with lisinopril at 5 mg daily (see problem 1)  -he may also have paula reduced BP after restarting empagliflozin (see problem 1)  -he will measure his BP daily and report to clinic for further adjustments.       3.  Electrolytes:  Potassium on higher side in past.  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 It appears improved after discontinuation of tacrolimus.       4. Anemia: Possibly related to Imuran in the past, which has been discontinued.  Iron stores are replete.  Anemia of chronic disease and renal insufficiency also likely contributing.  He did IVELISSE treatment and hemoglobin improved to 13.9 in the past.  IVELISSE has been restarted though ideally can be discontinued soon as hgb 10.4 and will ideally continue to improve without need for prolonged intervention.    -he will continue to follow in the anemia management clinic     Follow-up: RTC in 6 months     Reason For Visit:   CKD    HPI:   Frandy Workman is a 56 year old man who presents for CKD f/u.  His past medical history is remarkable for liver transplant (November, 2019) for ESTRADA cirrhosis (complicated by ascites and hepatic encephalopathy on lactulose and rifaximin in the past), hepatocellular carcinoma (s/p TACE and microwave ablation 01/2019), long standing DM 2 (complicated by nonproliferative diabetic retinopathy, macular edema and peripheral neuropathy), bipolar disorder (previously on lithium for 15 years), HTN, hyperlipidemia and CAD by angiography not requiring PCI.      He denies excessive use of NSAIDs in the past.  He had no history of nephrolithiasis or frequent UTIs.  He has no significant family history of CKD.     In terms of CKD, he appeared to have a baseline of ~1.1 mg/dL  prior to his liver transplant in November 2019.  Creatinine after transplant was ~1.3 mg/dL at time of discharge and vikram to 3.2 mg/dL thought to be prerenal from volume depletion.  He was admitted for IVF and creatinine improved to 1.5 mg/dL at time of discharge.  His serum Cr now seems to fluctuate around 1.8 mg/dL with an eGFR of 40 ml/min.  He continues on tacrolimus for his liver transplant.  He no longer is on lasix for management of edema.  He has not required IVELISSE therapy in several months (last August, 2020).  He recently was started on low dose carvedilol for management of hypertension.  He also recently started empagliflozin for management of diabetes. He reports weight gain due to lack of exercise during the COVID pandemic.  His main complaint today is that of pain near his incisional scars for his liver transplant for which he is following up with hepatology.  His BP this morning was 129/84.      Interval History:  Overall, Mr. Workman feels well.  In July, 2021 he had unstble angina in the setting of severe anemia and eventually was noted to have a NSTEMI and underwent a CABG.  He was started on lisinopril and metoprolol but lisinopril has since been discontinued due to hypotension. His empagliflozin was discontinued during his recent hospitalization. He is undergoing cardiac rehab and has recovered well.  He has no major medical complaints today.        Review of Systems:   Pertinent items are noted in HPI or as below, remainder of complete ROS is negative.      Active Medications:   Current Outpatient Medications   Medication     Acetaminophen (TYLENOL) 325 MG CAPS     ARIPiprazole (ABILIFY) 5 MG tablet     aspirin (SM ASPIRIN ADULT LOW STRENGTH) 81 MG EC tablet     BD VIKTORIA U/F 32G X 4 MM insulin pen needle     ciclopirox (LOPROX) 0.77 % cream     Continuous Blood Gluc  (FREESTYLE CLAUDIA 14 DAY READER) CECILIO     Continuous Blood Gluc Sensor (FREESTYLE CLAUDIA 14 DAY SENSOR) MISC     insulin aspart  (NOVOLOG PEN) 100 UNIT/ML pen     lamiVUDine (EPIVIR) 100 MG tablet     metoprolol succinate ER (TOPROL-XL) 25 MG 24 hr tablet     Multiple Vitamin (TAB-A-GLADIS) TABS     mycophenolate (GENERIC EQUIVALENT) 250 MG capsule     order for DME     oxyCODONE (ROXICODONE) 5 MG tablet     pantoprazole (PROTONIX) 40 MG EC tablet     polyethylene glycol (MIRALAX) 17 g packet     predniSONE (DELTASONE) 5 MG tablet     rosuvastatin (CRESTOR) 5 MG tablet     tamsulosin (FLOMAX) 0.4 MG capsule     vitamin B-12 (CYANOCOBALAMIN) 1000 MCG tablet     vitamin D3 (CHOLECALCIFEROL) 50 mcg (2000 units) tablet     Artificial Tear Solution (SM ARTIFICIAL TEARS) SOLN     insulin aspart (NOVOLOG PEN) 100 UNIT/ML pen     methocarbamol (ROBAXIN) 750 MG tablet     Multiple Vitamin (THERAVITE PO)     senna-docusate (SENOKOT-S/PERICOLACE) 8.6-50 MG tablet     Current Facility-Administered Medications   Medication     aflibercept (EYLEA) injection prefilled syringe 2 mg     aflibercept (EYLEA) injection prefilled syringe 2 mg     Facility-Administered Medications Ordered in Other Visits   Medication     darbepoetin elias-polysorbate (ARANESP) injection 60 mcg        Allergies:   Codeine and Seasonal allergies      Past Medical History:  Chronic kidney disease, stage 3  Type 2 diabetes mellitus  Bipolar affective disorder   Brow ptosis  Hepatocellular carcinoma  Coronary artery disease s/p CABG  Hypertension   Anemia   Anxiety   Mild recurrent major depression  Mild nonproliferative retinopathy  Gastroesophageal reflux disease   Cholelithiasis   Benign prostatic hypertrophy   Portal vein thrombosis      Past Surgical History:  Liver transplant recipient   Coronary catheterization  Parotidectomy, radical neck dissection  Right eyelid weight placement  Orthopedic surgery    Family History:   Prostate cancer - maternal grandfather, father   Substance abuse - maternal grandfather, maternal grandmother   Colon cancer - father, paternal grandmother  Cancer  "- mother  Thyroid disease - mother, sister   Stroke - mother  Depression - mother, sister  Asthma - sister      Social History:   Presents to clinic alone  Tobacco Use: Former smoker, 180 pack year history, quit 2017.   Alcohol Use: quit September 1996  PCP: Nerissa Moe      Physical Exam:  /80   Pulse 84   Temp 98  F (36.7  C) (Oral)   Ht 1.753 m (5' 9\")   Wt 77.6 kg (171 lb)   SpO2 100%   BMI 25.25 kg/m     Physical exam deferred as encounter was a telephone visit.      Laboratory:  CMP  Recent Labs   Lab Test 10/04/21  0802 09/24/21  1000 09/24/21  0912 08/23/21  0945 08/09/21  1022 08/09/21  1022 07/26/21  1055 07/26/21  1055 07/21/21  1057 07/21/21  0617 07/20/21  2233 07/20/21  2009 07/20/21  0749 07/20/21  0609 07/12/21  1036 06/28/21  1347 06/14/21  1322 06/14/21  1322 06/04/21  1236 06/04/21  1236 05/05/21  0909 05/05/21  0909    139  --  141  --  136   < > 137   < > 136  --   --    < > 140   < > 137   < > 139   < > 138   < > 139   POTASSIUM 4.2 4.6  --  4.1  --  4.4   < > 5.0   < > 4.1  --   --    < > 4.2   < > 4.6   < > 4.7   < > 4.6   < > 4.5   CHLORIDE 110* 105  --  107  --  105   < > 106   < > 104  --   --    < > 107   < > 107   < > 106   < > 106   < > 107   CO2 28 27  --  27  --  24   < > 20   < > 23  --   --    < > 26   < > 25   < > 26   < > 26   < > 26   ANIONGAP 3 7  --  7  --  7   < > 11   < > 9  --   --    < > 7   < > 5   < > 7   < > 6   < > 6   * 140*  --  169*  --  283*   < > 207*   < > 170*   < >  --    < > 135*   < > 208*   < > 173*   < > 156*   < > 258*   BUN 23 33*  --  24  --  24   < > 30   < > 18  --   --    < > 22   < > 33*   < > 29   < > 39*   < > 38*   CR 1.26* 1.30* 1.4* 1.31*   < > 1.50*   < > 1.31*   < > 1.03  --   --    < > 1.08   < > 1.62*   < > 1.54*   < > 1.64*   < > 1.52*   GFRESTIMATED 63 61 55* 60*   < > 51*   < > 60*   < > 80  --   --    < > 76   < > 46*   < > 49*   < > 46*   < > 50*   GFRESTBLACK  --   --   --   --   --   --   --   --   --   " --   --   --   --   --   --  54*  --  57*  --  53*  --  58*   DEBORAH 8.7 9.2  --  9.4  --  9.4   < > 9.8   < > 7.1*  --   --    < > 7.6*   < > 9.1   < > 9.8   < > 10.2*   < > 9.6   MAG  --  2.0  --   --   --  1.8  --   --   --  2.7*  --   --   --  2.8*   < >  --   --   --   --  2.2  --   --    PHOS 3.2  --   --   --   --   --   --   --   --  3.1  --  1.8*  --  2.5   < >  --   --   --   --   --   --   --    PROTTOTAL  --  7.3  --  7.1  --  7.2  --  7.4   < > 6.2*  --   --    < > 6.2*   < > 7.4   < > 7.8   < > 7.8   < > 7.8   ALBUMIN 4.0 4.0  --  3.7  --  3.7   < > 3.4   < > 2.7*  --   --    < > 2.6*   < > 4.0   < > 4.1   < > 4.2   < > 4.2   BILITOTAL  --  0.6  --  0.6  --  0.4  --  0.7   < > 0.5  --   --    < > 0.4   < > 0.5   < > 0.6   < > 0.5   < > 0.5   ALKPHOS  --  78  --  121  --  118  --  121   < > 126  --   --    < > 147   < > 98   < > 106   < > 108   < > 112   AST  --  6  --  7  --  5  --  12   < > 12  --   --    < > 12   < > 9   < > 8   < > 10   < > 13   ALT  --  17  --  16  --  15  --  19   < > 17  --   --    < > 19   < > 20   < > 21   < > 26   < > 28    < > = values in this interval not displayed.     CBC  Recent Labs   Lab Test 10/04/21  0802 09/24/21  1000 09/21/21  1026 09/07/21  1025 08/23/21  0945 08/23/21  0945 08/09/21  1022 08/09/21  1022   HGB 10.4* 9.8* 9.6* 8.7*   < > 7.0*   < > 8.2*   WBC 4.9 4.8  --   --   --  4.7  --  4.8   RBC 3.29* 3.10*  --   --   --  2.28*  --  2.74*   HCT 33.1* 31.3* 31.8* 28.0*   < > 22.8*   < > 26.6*   * 101*  --   --   --  100  --  97   MCH 31.6 31.6  --   --   --  30.7  --  29.9   MCHC 31.4* 31.3*  --   --   --  30.7*  --  30.8*   RDW 15.7* 16.0*  --   --   --  20.0*  --  20.1*    149*  --   --   --  171  --  211    < > = values in this interval not displayed.     INR  Recent Labs   Lab Test 07/14/21  1351 07/14/21  1215 07/12/21  1608 01/24/20  0837 11/12/19  1645 11/12/19  1400 11/12/19  0950 11/12/19  0346   INR 1.28* 1.45* 0.96 1.09   < > 1.46*   < >  1.47*   PTT 46* 37  --   --   --  39*  --  57*    < > = values in this interval not displayed.     ABG  Recent Labs   Lab Test 07/15/21  0425 07/14/21  2049 07/14/21  1605 07/14/21  1352 07/14/21  1351 07/14/21  1351 07/14/21  1252 07/14/21  1216 07/14/21  1216   PH  --   --  7.37  --   --  7.29* 7.36  --  7.38   PCO2  --   --  37  --   --  44 35  --  36   PO2  --   --  87  --   --  145* 181*  --  306*   HCO3  --   --  21  --   --  21 20*  --  21   O2PER 21 21 21 5   < > 5 50.0   < > 75.0    < > = values in this interval not displayed.      URINE STUDIES  Recent Labs   Lab Test 09/07/21  1115 12/04/19  1400 11/15/19  1123 07/08/19  1017   COLOR Yellow Light Yellow Light Yellow Yellow   APPEARANCE Cloudy* Clear Clear Clear   URINEGLC 50 * 30* 300* >499*   URINEBILI Negative Negative Negative Negative   URINEKETONE Negative Negative Negative Negative   SG 1.022 1.009 1.007 1.017   UBLD Small* Negative Small* Negative   URINEPH 5.0 5.0 6.5 5.0   PROTEIN >499* 30* Negative Negative   NITRITE Negative Negative Negative Negative   LEUKEST Negative Negative Negative Negative   RBCU 1 0 0 <1   WBCU 1 1 <1 3     Recent Labs   Lab Test 10/04/21  0804 09/07/21  1115 02/26/21  0750 06/29/20  1008 03/02/20  1013 12/02/19  1040 07/08/19  1017 02/04/19  0705   UTPG 1.46* 1.17* 0.47* 0.89* 0.29* 1.22* 0.11 0.14     PTH  Recent Labs   Lab Test 06/29/20  1005 07/08/19  1020   PTHI 61 54     IRON STUDIES   Recent Labs   Lab Test 07/12/21  1608 06/28/21  1347 06/04/21  1236 12/02/20  0739 11/11/20  0754 10/14/20  0836 09/16/20  0802 07/29/20  0749 06/29/20  1005 05/21/20  0723 04/22/20  0833 03/09/20  0823 01/27/20  1340 12/02/19  1034 12/28/18  1108 09/15/18  1215 06/09/17  1250   IRON  --  112 158 75 81 73 93 87 60 72 65 92  --  88  --  52  --    FEB  --  226* 270 228* 227* 248 221* 230* 204* 192* 215* 231*  --  248  --  403  --    IRONSAT  --  50* 59* 33 36 29 42 38 29 38 30 40  --  36  --  13*  --    QUAN 543* 495* 399* 433* 286 323  223 193 352 344 643* 859* 690* 1,353* 90 10* 450*       All above labs reviewed by me.   Eloy Rao MD   [phone]      Again, thank you for allowing me to participate in the care of your patient.      Sincerely,    Eloy Rao MD

## 2021-10-04 NOTE — PATIENT INSTRUCTIONS
-Please start lisinopril at 5 mg daily at bedtime  -Please also start empagliflozin at 10 mg daily  -Please measure your blood pressure daily for a few weeks after starting these medications  -Please report to clinic your blood pressure measurements especially if running on the los side (systolic blood pressure or top number less than 105, and diastolic blood pressure or bottom number less than 70)  -continue to follow your blood glucose levels three times a day as your insulin requirements may become less  -Otherwise, please return to clinic in 6 months  -We can be reached at [phone]

## 2021-10-04 NOTE — PROGRESS NOTES
Nephrology Clinic   Eloy Rao MD  10/04/2021     Name: Frandy Workman  MRN: [id number]  Age: 56 year old  : 1965  Referring provider: Natalie Russell     Assessment and Plan:  1. CKD, stage 3b (A3): Prior to recent liver transplant baseline Cr ~1.1 mg/dL with eGFR of 75 ml/min. Post-transplant creatinine is  ~1.6 mg/dL (eGFR of 40 ml/min) and remains stable (even better today which may relate to recent loss of muscle mass during hospitalization during which he underwent a CABG and discontinuation of tacrolimus).  I suspect he likely has some degree of diabetic changes further complicated by chronic changes from past lithium use (for 15 years) and further complicated by tacrolimus toxicity.  He is at risk of progressive CKD due diabetes.  As mentioned, he is now off of tacrolimus and continues on MMF and prednisone for his liver transplant.  He does have worsening albuminuria (1085 mg/g which may be thre result of discontinuation of his SGLT2 inhibitor.    -will restart RAAS blockade with lisinopril at a low dose of 5 mg daily with goal of slowing progeession of CKD and minimizing albuminuria  -will also restart empagliflozin but at a lower dose of 10 mg daiy with the goal of minimizing albuminuria, slowing CKD progression, and minimizing cardiovascular events given his recent CABG and ischemic HFrEF (45-50% by TTE In )     -he will monitor his JERILYN and blood glucose daily and report to clinc as wwe may need to back off his BP meds and/or insulin given the changes made above (initiation of lisinopril and empagliflozin)     -RTC in 6 months     2. HTN/Volume status: Euvolemic. Recently hypotensive but clinic BP aobve goal today of at least <130/80.    -he will continue metoprolol XL at 12.5 mg daily  -as mentioned we will also start RAAS blockade with lisinopril at 5 mg daily (see problem 1)  -he may also have paula reduced BP after restarting empagliflozin (see problem  1)  -he will measure his BP daily and report to clinic for further adjustments.       3.  Electrolytes:  Potassium on higher side in past.  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 It appears improved after discontinuation of tacrolimus.       4. Anemia: Possibly related to Imuran in the past, which has been discontinued.  Iron stores are replete.  Anemia of chronic disease and renal insufficiency also likely contributing.  He did IVELISSE treatment and hemoglobin improved to 13.9 in the past.  IVELISSE has been restarted though ideally can be discontinued soon as hgb 10.4 and will ideally continue to improve without need for prolonged intervention.    -he will continue to follow in the anemia management clinic     Follow-up: RTC in 6 months     Reason For Visit:   CKD    HPI:   Frandy Workman is a 56 year old man who presents for CKD f/u.  His past medical history is remarkable for liver transplant (November, 2019) for ESTRADA cirrhosis (complicated by ascites and hepatic encephalopathy on lactulose and rifaximin in the past), hepatocellular carcinoma (s/p TACE and microwave ablation 01/2019), long standing DM 2 (complicated by nonproliferative diabetic retinopathy, macular edema and peripheral neuropathy), bipolar disorder (previously on lithium for 15 years), HTN, hyperlipidemia and CAD by angiography not requiring PCI.      He denies excessive use of NSAIDs in the past.  He had no history of nephrolithiasis or frequent UTIs.  He has no significant family history of CKD.     In terms of CKD, he appeared to have a baseline of ~1.1 mg/dL prior to his liver transplant in November 2019.  Creatinine after transplant was ~1.3 mg/dL at time of discharge and vikram to 3.2 mg/dL thought to be prerenal from volume depletion.  He was admitted for IVF and creatinine improved to 1.5 mg/dL at time of discharge.  His serum Cr now seems to fluctuate around 1.8 mg/dL  with an eGFR of 40 ml/min.  He continues on tacrolimus for his liver transplant.  He no longer is on lasix for management of edema.  He has not required IVELISSE therapy in several months (last August, 2020).  He recently was started on low dose carvedilol for management of hypertension.  He also recently started empagliflozin for management of diabetes. He reports weight gain due to lack of exercise during the COVID pandemic.  His main complaint today is that of pain near his incisional scars for his liver transplant for which he is following up with hepatology.  His BP this morning was 129/84.      Interval History:  Overall, Mr. Workman feels well.  In July, 2021 he had unstble angina in the setting of severe anemia and eventually was noted to have a NSTEMI and underwent a CABG.  He was started on lisinopril and metoprolol but lisinopril has since been discontinued due to hypotension. His empagliflozin was discontinued during his recent hospitalization. He is undergoing cardiac rehab and has recovered well.  He has no major medical complaints today.        Review of Systems:   Pertinent items are noted in HPI or as below, remainder of complete ROS is negative.      Active Medications:   Current Outpatient Medications   Medication     Acetaminophen (TYLENOL) 325 MG CAPS     ARIPiprazole (ABILIFY) 5 MG tablet     aspirin (SM ASPIRIN ADULT LOW STRENGTH) 81 MG EC tablet     BD VIKTORIA U/F 32G X 4 MM insulin pen needle     ciclopirox (LOPROX) 0.77 % cream     Continuous Blood Gluc  (FREESTYLE CLAUDIA 14 DAY READER) CECILIO     Continuous Blood Gluc Sensor (FREESTYLE CLAUDIA 14 DAY SENSOR) MISC     insulin aspart (NOVOLOG PEN) 100 UNIT/ML pen     lamiVUDine (EPIVIR) 100 MG tablet     metoprolol succinate ER (TOPROL-XL) 25 MG 24 hr tablet     Multiple Vitamin (TAB-A-GLADIS) TABS     mycophenolate (GENERIC EQUIVALENT) 250 MG capsule     order for DME     oxyCODONE (ROXICODONE) 5 MG tablet     pantoprazole (PROTONIX) 40 MG EC  tablet     polyethylene glycol (MIRALAX) 17 g packet     predniSONE (DELTASONE) 5 MG tablet     rosuvastatin (CRESTOR) 5 MG tablet     tamsulosin (FLOMAX) 0.4 MG capsule     vitamin B-12 (CYANOCOBALAMIN) 1000 MCG tablet     vitamin D3 (CHOLECALCIFEROL) 50 mcg (2000 units) tablet     Artificial Tear Solution (SM ARTIFICIAL TEARS) SOLN     insulin aspart (NOVOLOG PEN) 100 UNIT/ML pen     methocarbamol (ROBAXIN) 750 MG tablet     Multiple Vitamin (THERAVITE PO)     senna-docusate (SENOKOT-S/PERICOLACE) 8.6-50 MG tablet     Current Facility-Administered Medications   Medication     aflibercept (EYLEA) injection prefilled syringe 2 mg     aflibercept (EYLEA) injection prefilled syringe 2 mg     Facility-Administered Medications Ordered in Other Visits   Medication     darbepoetin elias-polysorbate (ARANESP) injection 60 mcg        Allergies:   Codeine and Seasonal allergies      Past Medical History:  Chronic kidney disease, stage 3  Type 2 diabetes mellitus  Bipolar affective disorder   Brow ptosis  Hepatocellular carcinoma  Coronary artery disease s/p CABG  Hypertension   Anemia   Anxiety   Mild recurrent major depression  Mild nonproliferative retinopathy  Gastroesophageal reflux disease   Cholelithiasis   Benign prostatic hypertrophy   Portal vein thrombosis      Past Surgical History:  Liver transplant recipient   Coronary catheterization  Parotidectomy, radical neck dissection  Right eyelid weight placement  Orthopedic surgery    Family History:   Prostate cancer - maternal grandfather, father   Substance abuse - maternal grandfather, maternal grandmother   Colon cancer - father, paternal grandmother  Cancer - mother  Thyroid disease - mother, sister   Stroke - mother  Depression - mother, sister  Asthma - sister      Social History:   Presents to clinic alone  Tobacco Use: Former smoker, 180 pack year history, quit 2017.   Alcohol Use: quit September 1996  PCP: Nerissa Moe      Physical Exam:  /80    "Pulse 84   Temp 98  F (36.7  C) (Oral)   Ht 1.753 m (5' 9\")   Wt 77.6 kg (171 lb)   SpO2 100%   BMI 25.25 kg/m     Physical exam deferred as encounter was a telephone visit.      Laboratory:  CMP  Recent Labs   Lab Test 10/04/21  0802 09/24/21  1000 09/24/21  0912 08/23/21  0945 08/09/21  1022 08/09/21  1022 07/26/21  1055 07/26/21  1055 07/21/21  1057 07/21/21  0617 07/20/21  2233 07/20/21  2009 07/20/21  0749 07/20/21  0609 07/12/21  1036 06/28/21  1347 06/14/21  1322 06/14/21  1322 06/04/21  1236 06/04/21  1236 05/05/21  0909 05/05/21  0909    139  --  141  --  136   < > 137   < > 136  --   --    < > 140   < > 137   < > 139   < > 138   < > 139   POTASSIUM 4.2 4.6  --  4.1  --  4.4   < > 5.0   < > 4.1  --   --    < > 4.2   < > 4.6   < > 4.7   < > 4.6   < > 4.5   CHLORIDE 110* 105  --  107  --  105   < > 106   < > 104  --   --    < > 107   < > 107   < > 106   < > 106   < > 107   CO2 28 27  --  27  --  24   < > 20   < > 23  --   --    < > 26   < > 25   < > 26   < > 26   < > 26   ANIONGAP 3 7  --  7  --  7   < > 11   < > 9  --   --    < > 7   < > 5   < > 7   < > 6   < > 6   * 140*  --  169*  --  283*   < > 207*   < > 170*   < >  --    < > 135*   < > 208*   < > 173*   < > 156*   < > 258*   BUN 23 33*  --  24  --  24   < > 30   < > 18  --   --    < > 22   < > 33*   < > 29   < > 39*   < > 38*   CR 1.26* 1.30* 1.4* 1.31*   < > 1.50*   < > 1.31*   < > 1.03  --   --    < > 1.08   < > 1.62*   < > 1.54*   < > 1.64*   < > 1.52*   GFRESTIMATED 63 61 55* 60*   < > 51*   < > 60*   < > 80  --   --    < > 76   < > 46*   < > 49*   < > 46*   < > 50*   GFRESTBLACK  --   --   --   --   --   --   --   --   --   --   --   --   --   --   --  54*  --  57*  --  53*  --  58*   DEBORAH 8.7 9.2  --  9.4  --  9.4   < > 9.8   < > 7.1*  --   --    < > 7.6*   < > 9.1   < > 9.8   < > 10.2*   < > 9.6   MAG  --  2.0  --   --   --  1.8  --   --   --  2.7*  --   --   --  2.8*   < >  --   --   --   --  2.2  --   --    PHOS 3.2  --   --   " --   --   --   --   --   --  3.1  --  1.8*  --  2.5   < >  --   --   --   --   --   --   --    PROTTOTAL  --  7.3  --  7.1  --  7.2  --  7.4   < > 6.2*  --   --    < > 6.2*   < > 7.4   < > 7.8   < > 7.8   < > 7.8   ALBUMIN 4.0 4.0  --  3.7  --  3.7   < > 3.4   < > 2.7*  --   --    < > 2.6*   < > 4.0   < > 4.1   < > 4.2   < > 4.2   BILITOTAL  --  0.6  --  0.6  --  0.4  --  0.7   < > 0.5  --   --    < > 0.4   < > 0.5   < > 0.6   < > 0.5   < > 0.5   ALKPHOS  --  78  --  121  --  118  --  121   < > 126  --   --    < > 147   < > 98   < > 106   < > 108   < > 112   AST  --  6  --  7  --  5  --  12   < > 12  --   --    < > 12   < > 9   < > 8   < > 10   < > 13   ALT  --  17  --  16  --  15  --  19   < > 17  --   --    < > 19   < > 20   < > 21   < > 26   < > 28    < > = values in this interval not displayed.     CBC  Recent Labs   Lab Test 10/04/21  0802 09/24/21  1000 09/21/21  1026 09/07/21  1025 08/23/21  0945 08/23/21  0945 08/09/21  1022 08/09/21  1022   HGB 10.4* 9.8* 9.6* 8.7*   < > 7.0*   < > 8.2*   WBC 4.9 4.8  --   --   --  4.7  --  4.8   RBC 3.29* 3.10*  --   --   --  2.28*  --  2.74*   HCT 33.1* 31.3* 31.8* 28.0*   < > 22.8*   < > 26.6*   * 101*  --   --   --  100  --  97   MCH 31.6 31.6  --   --   --  30.7  --  29.9   MCHC 31.4* 31.3*  --   --   --  30.7*  --  30.8*   RDW 15.7* 16.0*  --   --   --  20.0*  --  20.1*    149*  --   --   --  171  --  211    < > = values in this interval not displayed.     INR  Recent Labs   Lab Test 07/14/21  1351 07/14/21  1215 07/12/21  1608 01/24/20  0837 11/12/19  1645 11/12/19  1400 11/12/19  0950 11/12/19  0346   INR 1.28* 1.45* 0.96 1.09   < > 1.46*   < > 1.47*   PTT 46* 37  --   --   --  39*  --  57*    < > = values in this interval not displayed.     ABG  Recent Labs   Lab Test 07/15/21  0425 07/14/21  2049 07/14/21  1605 07/14/21  1352 07/14/21  1351 07/14/21  1351 07/14/21  1252 07/14/21  1216 07/14/21  1216   PH  --   --  7.37  --   --  7.29* 7.36  --  7.38    PCO2  --   --  37  --   --  44 35  --  36   PO2  --   --  87  --   --  145* 181*  --  306*   HCO3  --   --  21  --   --  21 20*  --  21   O2PER 21 21 21 5   < > 5 50.0   < > 75.0    < > = values in this interval not displayed.      URINE STUDIES  Recent Labs   Lab Test 09/07/21  1115 12/04/19  1400 11/15/19  1123 07/08/19  1017   COLOR Yellow Light Yellow Light Yellow Yellow   APPEARANCE Cloudy* Clear Clear Clear   URINEGLC 50 * 30* 300* >499*   URINEBILI Negative Negative Negative Negative   URINEKETONE Negative Negative Negative Negative   SG 1.022 1.009 1.007 1.017   UBLD Small* Negative Small* Negative   URINEPH 5.0 5.0 6.5 5.0   PROTEIN >499* 30* Negative Negative   NITRITE Negative Negative Negative Negative   LEUKEST Negative Negative Negative Negative   RBCU 1 0 0 <1   WBCU 1 1 <1 3     Recent Labs   Lab Test 10/04/21  0804 09/07/21  1115 02/26/21  0750 06/29/20  1008 03/02/20  1013 12/02/19  1040 07/08/19  1017 02/04/19  0705   UTPG 1.46* 1.17* 0.47* 0.89* 0.29* 1.22* 0.11 0.14     PTH  Recent Labs   Lab Test 06/29/20  1005 07/08/19  1020   PTHI 61 54     IRON STUDIES   Recent Labs   Lab Test 07/12/21  1608 06/28/21  1347 06/04/21  1236 12/02/20  0739 11/11/20  0754 10/14/20  0836 09/16/20  0802 07/29/20  0749 06/29/20  1005 05/21/20  0723 04/22/20  0833 03/09/20  0823 01/27/20  1340 12/02/19  1034 12/28/18  1108 09/15/18  1215 06/09/17  1250   IRON  --  112 158 75 81 73 93 87 60 72 65 92  --  88  --  52  --    FEB  --  226* 270 228* 227* 248 221* 230* 204* 192* 215* 231*  --  248  --  403  --    IRONSAT  --  50* 59* 33 36 29 42 38 29 38 30 40  --  36  --  13*  --    QUAN 543* 495* 399* 433* 286 323 223 193 352 344 643* 859* 690* 1,353* 90 10* 450*       All above labs reviewed by me.   Eloy Rao MD   [phone]

## 2021-10-05 ENCOUNTER — TELEPHONE (OUTPATIENT)
Dept: PHARMACY | Facility: CLINIC | Age: 56
End: 2021-10-05

## 2021-10-05 ENCOUNTER — ALLIED HEALTH/NURSE VISIT (OUTPATIENT)
Dept: TRANSPLANT | Facility: CLINIC | Age: 56
End: 2021-10-05
Attending: INTERNAL MEDICINE
Payer: MEDICARE

## 2021-10-05 ENCOUNTER — LAB (OUTPATIENT)
Dept: LAB | Facility: CLINIC | Age: 56
End: 2021-10-05
Attending: INTERNAL MEDICINE
Payer: MEDICARE

## 2021-10-05 DIAGNOSIS — N18.32 STAGE 3B CHRONIC KIDNEY DISEASE (H): ICD-10-CM

## 2021-10-05 DIAGNOSIS — N18.32 ANEMIA OF CHRONIC RENAL FAILURE, STAGE 3B (H): ICD-10-CM

## 2021-10-05 DIAGNOSIS — D63.1 ANEMIA DUE TO STAGE 4 CHRONIC KIDNEY DISEASE (H): Primary | ICD-10-CM

## 2021-10-05 DIAGNOSIS — N18.4 ANEMIA DUE TO STAGE 4 CHRONIC KIDNEY DISEASE (H): Primary | ICD-10-CM

## 2021-10-05 DIAGNOSIS — D63.1 ANEMIA OF CHRONIC RENAL FAILURE, STAGE 3B (H): ICD-10-CM

## 2021-10-05 LAB
DEPRECATED CALCIDIOL+CALCIFEROL SERPL-MC: <79 UG/L (ref 20–75)
HCT VFR BLD AUTO: 32.6 % (ref 40–53)
HGB BLD-MCNC: 10.1 G/DL (ref 13.3–17.7)
VITAMIN D2 SERPL-MCNC: <5 UG/L
VITAMIN D3 SERPL-MCNC: 74 UG/L

## 2021-10-05 PROCEDURE — 85014 HEMATOCRIT: CPT | Performed by: PATHOLOGY

## 2021-10-05 PROCEDURE — 85018 HEMOGLOBIN: CPT | Performed by: PATHOLOGY

## 2021-10-05 PROCEDURE — 36415 COLL VENOUS BLD VENIPUNCTURE: CPT | Performed by: PATHOLOGY

## 2021-10-05 NOTE — TELEPHONE ENCOUNTER
Anemia Management Note  SUBJECTIVE/OBJECTIVE:  Referred by Dr. Eloy Rao on 2021  Primary Diagnosis: Anemia in Chronic Kidney Disease (N18.3, D63.1)   3b  Secondary Diagnosis:  Chronic Kidney Disease, Stage 3 (N18.3)  3b  Liver Tx: 2019  Hgb goal range:  9-10  Epo/Darbo: Aranesp 60mcg every 14 days for Hgb <10.  In Clinic.  *dose increased to 60mcg starting with  21 dose  Iron regimen:  NA.  Iron levels stable  Labs : 2022  Recent IVELISSE use, transfusion, IV iron: Aranesp   RX/TX plans :  6/10/2022     No history of stroke, MI and blood clots.  History of hepatocellular carcinoma - s/p TACE 2019 and liver transplant 2019.     Contact:            Ok to leave message regarding scheduling, medical, and billing per consent to communicate dated 10/2/19                              OK to speak with Davi HurstSaunders (friend/POA) regarding scheduling, medical, and billing per consent to communicate dated 10/2/19    Anemia Latest Ref Rng & Units 2021 2021 2021 2021 2021 10/4/2021 10/5/2021   IVELISSE Dose - 40mcg 40mcg 60 mcg 60 mcg - - -   Hemoglobin 13.3 - 17.7 g/dL 8.2(L) 7.0(L) 8.7(L) 9.6(L) 9.8(L) 10.4(L) 10.1(L)   Ferritin 26 - 388 ng/mL - - - - - - -   PRBCs - - - - - - - -     BP Readings from Last 3 Encounters:   10/04/21 137/80   21 127/78   21 121/76     Wt Readings from Last 2 Encounters:   10/04/21 171 lb (77.6 kg)   21 155 lb 9.6 oz (70.6 kg)           ASSESSMENT:  Hgb:Above goal - recommend hold dose  TSat: Due for labs Ferritin: Due for labs    PLAN:  Hold Aranesp and RTC for hgb then aranesp if needed in 2 week(s)    Orders needed to be renewed (for next follow-up date) in EPIC: None    Iron labs due:  Due    Plan discussed with:  No call, chart review  Plan provided by:  adri    NEXT FOLLOW-UP DATE:  10/19/21     Jie Blum RN   Anemia Services  [address] [address]   [email or web address]    Office : [phone]  Fax: [phone]

## 2021-10-05 NOTE — NURSING NOTE
Pt came in for possible Aranesp, Hgb today 10.1 so dose held per order, pt informed and will come back in 2 weeks for recheck, he verbally understood and agreed.    Anne-Marie Boykin, CMA

## 2021-10-06 ENCOUNTER — HOSPITAL ENCOUNTER (OUTPATIENT)
Dept: CARDIAC REHAB | Facility: CLINIC | Age: 56
End: 2021-10-06
Attending: PHYSICIAN ASSISTANT
Payer: MEDICARE

## 2021-10-06 PROCEDURE — 93798 PHYS/QHP OP CAR RHAB W/ECG: CPT

## 2021-10-08 ENCOUNTER — HOSPITAL ENCOUNTER (OUTPATIENT)
Dept: CARDIAC REHAB | Facility: CLINIC | Age: 56
End: 2021-10-08
Attending: PHYSICIAN ASSISTANT
Payer: MEDICARE

## 2021-10-08 PROCEDURE — 93798 PHYS/QHP OP CAR RHAB W/ECG: CPT

## 2021-10-09 ENCOUNTER — HEALTH MAINTENANCE LETTER (OUTPATIENT)
Age: 56
End: 2021-10-09

## 2021-10-11 ENCOUNTER — HOSPITAL ENCOUNTER (OUTPATIENT)
Dept: CARDIAC REHAB | Facility: CLINIC | Age: 56
End: 2021-10-11
Attending: PHYSICIAN ASSISTANT
Payer: MEDICARE

## 2021-10-11 PROCEDURE — 93798 PHYS/QHP OP CAR RHAB W/ECG: CPT

## 2021-10-13 ENCOUNTER — HOSPITAL ENCOUNTER (OUTPATIENT)
Dept: CARDIAC REHAB | Facility: CLINIC | Age: 56
End: 2021-10-13
Attending: PHYSICIAN ASSISTANT
Payer: MEDICARE

## 2021-10-13 PROCEDURE — 93798 PHYS/QHP OP CAR RHAB W/ECG: CPT

## 2021-10-15 ENCOUNTER — HOSPITAL ENCOUNTER (OUTPATIENT)
Dept: CARDIAC REHAB | Facility: CLINIC | Age: 56
End: 2021-10-15
Attending: PHYSICIAN ASSISTANT
Payer: MEDICARE

## 2021-10-15 DIAGNOSIS — Z94.4 LIVER TRANSPLANT RECIPIENT (H): ICD-10-CM

## 2021-10-15 PROCEDURE — 93798 PHYS/QHP OP CAR RHAB W/ECG: CPT

## 2021-10-15 RX ORDER — LANOLIN ALCOHOL/MO/W.PET/CERES
CREAM (GRAM) TOPICAL
Qty: 30 TABLET | Refills: 11 | Status: SHIPPED | OUTPATIENT
Start: 2021-10-15 | End: 2023-01-04

## 2021-10-18 ENCOUNTER — HOSPITAL ENCOUNTER (OUTPATIENT)
Dept: CARDIAC REHAB | Facility: CLINIC | Age: 56
End: 2021-10-18
Attending: PHYSICIAN ASSISTANT
Payer: MEDICARE

## 2021-10-18 PROCEDURE — 93798 PHYS/QHP OP CAR RHAB W/ECG: CPT

## 2021-10-19 ENCOUNTER — VIRTUAL VISIT (OUTPATIENT)
Dept: BEHAVIORAL HEALTH | Facility: CLINIC | Age: 56
End: 2021-10-19
Payer: MEDICARE

## 2021-10-19 ENCOUNTER — LAB (OUTPATIENT)
Dept: LAB | Facility: CLINIC | Age: 56
End: 2021-10-19
Attending: INTERNAL MEDICINE
Payer: MEDICARE

## 2021-10-19 ENCOUNTER — ALLIED HEALTH/NURSE VISIT (OUTPATIENT)
Dept: TRANSPLANT | Facility: CLINIC | Age: 56
End: 2021-10-19
Attending: INTERNAL MEDICINE
Payer: MEDICARE

## 2021-10-19 ENCOUNTER — TELEPHONE (OUTPATIENT)
Dept: PHARMACY | Facility: CLINIC | Age: 56
End: 2021-10-19

## 2021-10-19 DIAGNOSIS — F41.1 GENERALIZED ANXIETY DISORDER: ICD-10-CM

## 2021-10-19 DIAGNOSIS — N18.4 ANEMIA DUE TO STAGE 4 CHRONIC KIDNEY DISEASE (H): Primary | ICD-10-CM

## 2021-10-19 DIAGNOSIS — D63.1 ANEMIA DUE TO STAGE 4 CHRONIC KIDNEY DISEASE (H): Primary | ICD-10-CM

## 2021-10-19 DIAGNOSIS — D63.1 ANEMIA OF CHRONIC RENAL FAILURE, STAGE 3B (H): ICD-10-CM

## 2021-10-19 DIAGNOSIS — F31.70 BIPOLAR AFFECTIVE DISORDER IN REMISSION (H): Primary | ICD-10-CM

## 2021-10-19 DIAGNOSIS — N18.32 STAGE 3B CHRONIC KIDNEY DISEASE (H): ICD-10-CM

## 2021-10-19 DIAGNOSIS — N18.32 ANEMIA OF CHRONIC RENAL FAILURE, STAGE 3B (H): ICD-10-CM

## 2021-10-19 LAB
HCT VFR BLD AUTO: 36.6 % (ref 40–53)
HGB BLD-MCNC: 11.6 G/DL (ref 13.3–17.7)

## 2021-10-19 PROCEDURE — 85018 HEMOGLOBIN: CPT | Performed by: PATHOLOGY

## 2021-10-19 PROCEDURE — 90832 PSYTX W PT 30 MINUTES: CPT | Mod: 95 | Performed by: PSYCHOLOGIST

## 2021-10-19 PROCEDURE — 36415 COLL VENOUS BLD VENIPUNCTURE: CPT | Performed by: PATHOLOGY

## 2021-10-19 PROCEDURE — 85014 HEMATOCRIT: CPT | Performed by: PATHOLOGY

## 2021-10-19 NOTE — TELEPHONE ENCOUNTER
Anemia Management Note  SUBJECTIVE/OBJECTIVE:  Referred by Dr. Eloy Rao on 2021  Primary Diagnosis: Anemia in Chronic Kidney Disease (N18.3, D63.1)   3b  Secondary Diagnosis:  Chronic Kidney Disease, Stage 3 (N18.3)  3b  Liver Tx: 2019  Hgb goal range:  9-10  Epo/Darbo: Aranesp 60mcg every 14 days for Hgb <10.  In Clinic.  *dose increased to 60mcg starting with  21 dose  Iron regimen:  NA.  Iron levels stable  Labs : 2022  Recent IVELISSE use, transfusion, IV iron: Aranesp   RX/TX plans :  6/10/2022     No history of stroke, MI and blood clots.  History of hepatocellular carcinoma - s/p TACE 2019 and liver transplant 2019.     Contact:            Ok to leave message regarding scheduling, medical, and billing per consent to communicate dated 10/2/19                              OK to speak with Davi HurstSaunders (friend/POA) regarding scheduling, medical, and billing per consent to communicate dated 10/2/19    Anemia Latest Ref Rng & Units 2021 2021 2021 2021 10/4/2021 10/5/2021 10/19/2021   IVELISSE Dose - 40mcg 60 mcg 60 mcg - - - -   Hemoglobin 13.3 - 17.7 g/dL 7.0(L) 8.7(L) 9.6(L) 9.8(L) 10.4(L) 10.1(L) 11.6(L)   Ferritin 26 - 388 ng/mL - - - - - - -   PRBCs - - - - - - - -     BP Readings from Last 3 Encounters:   10/04/21 137/80   21 127/78   21 121/76     Wt Readings from Last 2 Encounters:   10/04/21 171 lb (77.6 kg)   21 155 lb 9.6 oz (70.6 kg)           ASSESSMENT:  Hgb:Above goal - recommend hold dose  TSat: Due for labs Ferritin: Due for labs    PLAN:  Hold Aranesp and RTC for hgb then aranesp if needed in 2-4 week(s)    Orders needed to be renewed (for next follow-up date) in EPIC: None    Iron labs due:  Due    Plan discussed with:  No call, chart review  Plan provided by:  adri    NEXT FOLLOW-UP DATE:  21 11a appt    Jie Blum RN   Anemia Services  [address] [address]    [email or web address]   Office : [phone]  Fax: [phone]

## 2021-10-19 NOTE — PROGRESS NOTES
[facility]: Integrated Behavioral Health  October 19, 2021      Behavioral Health Clinician Progress Note    Patient Name: Frandy Workman           Service Type: Video visit      Service Location:  Video visit      Session Start Time: 2:02  Session End Time:  2:37      Session Length: 16 - 37      Attendees: Patient    Visit Activities (Refresh list every visit): [facility] Only     Telemedicine Visit: The patient's condition can be safely assessed and treated via synchronous audio and visual telemedicine encounter.      Reason for Telemedicine Visit: COVID-19    Originating Site (Patient Location): Patient's home    Distant Site (Provider Location): Provider Remote Setting    Consent:  The patient/guardian has verbally consented to: the potential risks and benefits of telemedicine (video visit) versus in person care; bill my insurance or make self-payment for services provided; and responsibility for payment of non-covered services.     Mode of Communication:  Video Conference via Funk[employer] Android    As the provider I attest to compliance with applicable laws and regulations related to telemedicine.    Diagnostic Assessment Date:  12/03/2020  Treatment Plan Review Date:  11/2/2021  See Flowsheets for today's PHQ-9 and LIZZETTE-7 results  Previous PHQ-9:   PHQ-9 SCORE 12/29/2020 4/7/2021 9/30/2021   PHQ-9 Total Score MyChart 5 (Mild depression) 7 (Mild depression) -   PHQ-9 Total Score 5 7 10     Previous LIZZETTE-7:   LIZZETTE-7 SCORE 12/29/2020 4/7/2021 9/30/2021   Total Score 8 (mild anxiety) 9 (mild anxiety) -   Total Score 8 9 10       Extended Session (60+ minutes): No  Interactive Complexity: No  Crisis: No    Treatment Objective(s) Addressed in This Session:  Target Behavior(s): disease management/lifestyle changes  related to adaptive approaches to managing anxious distress       Current Stressors / Issues:  [facility] met with Miller vieyra over telemedicine to continue supporting his treatment of mood  "difficulties and adjusting to chronic disease difficulties. Miller reports feeling positive today about improvements he is seeing with his increased hemoglobin levels and matters with his health. He reports continuing to struggle with low energy and fatigue throughout the day, but is trying to exercise and keep active. Miller indicates he continues to struggle with feeling anxious and he believes this holds him back from reaching out to others, like calling his aunt. We discussed why calling his aunt in particular would be important for him, as Miller reports she is a valuable social contact for him that lends him support. Miller reports he has fears his aunt wants nothing to do with him and/or is upset with him, though he has no evidence to support this thought. As an experiement, we agreed to have Miller call his aunt during the session to say lee and that he is appreciative of her. Miller called and he talked with his aunt for about five minutes. After the call, Miller said he felt much better and we explored how his conversation aligned with his anxious thoughts. Discussed ways in particular his anxious thoughts were not consistent with his experience. Miller identified how he believes he was thinking \"irrationally\" with regard to reaching out to his aunt and we explored alternative thoughts, or more logical ones, he could use moving forward when considering reaching out to others.       Progress on Treatment Objective(s) / Homework:  Satisfactory progress - ACTION (Actively working towards change); Intervened by reinforcing change plan / affirming steps taken      Solution-Focused Therapy    Explore patterns in patient's relationships and discuss options for new behaviors.    Explore patterns in patient's actions and choices and discuss options for new behaviors.    Behavioral Activation    Discuss steps patient can take to become more involved in meaningful activity.    Identify barriers to these activities and explore possible " solutions.      Answers for HPI/ROS submitted by the patient on 4/7/2021   LIZZETTE 7 TOTAL SCORE: 9  If you checked off any problems, how difficult have these problems made it for you to do your work, take care of things at home, or get along with other people?: Very difficult  PHQ9 TOTAL SCORE: 7*    *No SI    Answers for HPI/ROS submitted by the patient on 10/13/2020   If you checked off any problems, how difficult have these problems made it for you to do your work, take care of things at home, or get along with other people?: Somewhat difficult  PHQ9 TOTAL SCORE: 8*  LIZZETTE 7 TOTAL SCORE: 6      *No SI     Answers for HPI/ROS submitted by the patient on 12/29/2020   If you checked off any problems, how difficult have these problems made it for you to do your work, take care of things at home, or get along with other people?: Somewhat difficult  PHQ9 TOTAL SCORE: 5*  LIZZETTE 7 TOTAL SCORE: 8    *No SI       Care Plan review completed: yes    Medication Review:  No changes to current psychiatric medication(s)     Current Outpatient Medications   Medication     Acetaminophen (TYLENOL) 325 MG CAPS     ARIPiprazole (ABILIFY) 5 MG tablet     aspirin (SM ASPIRIN ADULT LOW STRENGTH) 81 MG EC tablet     BD VIKTORIA U/F 32G X 4 MM insulin pen needle     ciclopirox (LOPROX) 0.77 % cream     Continuous Blood Gluc  (FREESTYLE CLAUDIA 14 DAY READER) CECILIO     Continuous Blood Gluc Sensor (FREESTYLE CLAUDIA 14 DAY SENSOR) MISC     cyanocobalamin (VITAMIN B-12) 1000 MCG tablet     empagliflozin (JARDIANCE) 10 MG TABS tablet     insulin aspart (NOVOLOG PEN) 100 UNIT/ML pen     lamiVUDine (EPIVIR) 100 MG tablet     lisinopril (ZESTRIL) 5 MG tablet     methocarbamol (ROBAXIN) 750 MG tablet     metoprolol succinate ER (TOPROL-XL) 25 MG 24 hr tablet     Multiple Vitamin (TAB-A-GLADIS) TABS     mycophenolate (GENERIC EQUIVALENT) 250 MG capsule     order for DME     oxyCODONE (ROXICODONE) 5 MG tablet     pantoprazole (PROTONIX) 40 MG EC tablet      polyethylene glycol (MIRALAX) 17 g packet     predniSONE (DELTASONE) 5 MG tablet     rosuvastatin (CRESTOR) 5 MG tablet     senna-docusate (SENOKOT-S/PERICOLACE) 8.6-50 MG tablet     tamsulosin (FLOMAX) 0.4 MG capsule     vitamin D3 (CHOLECALCIFEROL) 50 mcg (2000 units) tablet     Current Facility-Administered Medications   Medication     aflibercept (EYLEA) injection prefilled syringe 2 mg     aflibercept (EYLEA) injection prefilled syringe 2 mg     Facility-Administered Medications Ordered in Other Visits   Medication     darbepoetin elias-polysorbate (ARANESP) injection 60 mcg         Medication Compliance:  Yes    Changes in Health Issues:   None reported    Chemical Use Review:   Substance Use: Chemical use reviewed, no active concerns identified      Tobacco Use: No current tobacco use.         Assessment: Current Emotional / Mental Status (status of significant symptoms):  Risk status (Self / Other harm or suicidal ideation)  Patient denies a history of suicidal ideation, suicide attempts, self-injurious behavior, homicidal ideation, homicidal behavior and and other safety concerns     Patient denies current fears or concerns for personal safety.  Patient denies current or recent suicidal ideation or behaviors.  Patient denies current or recent homicidal ideation or behaviors.  Patient denies current or recent self injurious behavior or ideation.  Patient denies other safety concerns.     A safety and risk management plan has not been developed at this time, however patient was encouraged to call [place] [phone] should there be a change in any of these risk factors.    [place] Suicide Severity Rating Scale (Short Version)  [place] Suicide Severity Rating (Short Version) 1/24/2019 11/10/2019 12/2/2019 12/10/2019 6/11/2020 7/1/2020 7/12/2021   Over the past 2 weeks have you felt down, depressed, or hopeless? - no yes yes no no no   Over the past 2 weeks have you had thoughts of killing yourself? - no yes  no no no no   Comments - - lots of stressors, has thought about not taking meds - - - -   Have you ever attempted to kill yourself? - no no no no no no   Q1 Wished to be Dead (Past Month) no - other (see comments) no - - -   Comments - - why did i do this surgery - - - -   Q2 Suicidal Thoughts (Past Month) no - no no - - -   Comments - - wishes he wouldn't have gone through surgery - - - -   Q3 Suicidal Thought Method - - no no - - -   Q4 Suicidal Intent without Specific Plan - - no no - - -   Q5 Suicide Intent with Specific Plan - - no no - - -   Q6 Suicide Behavior (Lifetime) no - no no - - -         Appearance:   Appropriate   Eye Contact:   Good   Psychomotor Behavior: Restless   Attitude:   Cooperative  Interested Friendly Pleasant  Orientation:   All  Speech   Rate / Production: Normal/ Responsive   Volume:  Normal   Mood:    Normal  Affect:    Appropriate    Thought Content:  Clear   Thought Form:  Goal Directed  Logical   Insight:    Good     Diagnoses:  1. Bipolar affective disorder in remission (H)    2. Generalized anxiety disorder          Collateral Reports Completed:  Not Applicable    Plan: (Homework, other):  Continue with this writer for individual psychotherapy in three weeks. He will work on managing anxiety through adaptive behavior change, like tolerable/safe exercise/movement and engaging with social support contacts.     Joseph Murillo, LP  October 19, 2021          ______________________________________________________________________    [facility] Integrated [facility]al Health Treatment Plan    Client's Name: Frandy Workman  YOB: 1965    Date: August 2, 2021      DSM-V Diagnoses: 296.51 Bipolar I Disorder Current or Most Recent Episode Depressed, Mild;     Clinical Global Impressions  First:  Considering your total clinical experience with this particular patient population, how severe are the patient's symptoms at this time?: 2 (8/2/2021 10:26 AM)      Most recent:  Compared to the  patient's condition at the START of treatment, this patient's condition is: 2 (12/18/2020  2:22 PM)        Referral / Collaboration:  Will collaborate with care team as indicated during treatment.    Anticipated number of session or this episode of care: 10+      MeasurableTreatment Goal(s) related to diagnosis / functional impairment(s)  Goal 1:  Patient will experience a reduction in depressive and anxious symptoms, along with a corresponding increase in positive emotion and life satisfaction.    Objective #A: Patient will experience a reduction in depressed mood, will develop more effective coping skills to manage depressive symptoms, will develop healthy cognitive patterns and beliefs, will increase ability to function adaptively and will continue to take medications as prescribed / participate in supportive activities and services    Status: Continued - Date(s): August 2, 2021    Objective #B: Patient will experience a reduction in anxiety, will develop more effective coping skills to manage anxiety symptoms, will develop healthy cognitive patterns and beliefs and will increase ability to function adaptively  Status: Continued - Date(s): August 2, 2021    Objective #C: Patient will develop better understanding of triggers and coping strategies to stabilize mood  Status: Continued - Date(s): August 2, 2021    Goal 2:  Patient will identify and increase engagement in valued activity, i.e. improving social connections/relationships, pursuing occupational goals or personally meaningful pursuits, exploration of meaning in life.     Objective #A: Patient will identify meaningful activity in social, occupational and  personal goals, and increase behavioral activation around these goals   Status: Continued - Date(s): August 2, 2021    Objective #B: Patient will address relationship difficulties in a more adaptive manner  Status: Continued - Date(s): August 2, 2021    Objective #C: Patient will develop  coping/problem-solving skills to facilitate more adaptive adjustment and will effectively address problems that interfere with adaptive functioning  Status: Continued - Date(s): August 2, 2021        Possible Therapeutic Intervention(s)  Psycho-education regarding mental health diagnoses and treatment options    Skills training    Explore skills useful to client in current situation.    Skills include assertiveness, communication, conflict management, problem-solving, relaxation, etc.    Solution-Focused Therapy    Explore patterns in patient's relationships and discuss options for new behaviors.    Explore patterns in patient's actions and choices and discuss options for new behaviors.    Cognitive-behavioral Therapy    Discuss common cognitive distortions, identify them in patient's life.    Explore ways to challenge, replace, and act against these cognitions.    Acceptance and Commitment Therapy    Explore and identify important values in patient's life.    Discuss ways to commit to behavioral activation around these values.    Psychodynamic psychotherapy    Discuss patient's emotional dynamics and issues and how they impact behaviors.    Explore patient's history of relationships and how they impact present behaviors.    Explore how to work with and make changes in these schemas and patterns.    Narrative Therapy    Explore the patient's story of his/her life from his/her perspective.    Explore alternate ways of understanding their experience, identifying exceptions, developing new themes.    Interpersonal Psychotherapy    Explore patterns in relationships that are effective or ineffective at helping patient reach their goals, find satisfying experience.    Discuss new patterns or behaviors to engage in for improved social functioning.    Behavioral Activation    Discuss steps patient can take to become more involved in meaningful activity.    Identify barriers to these activities and explore possible  solutions.    Mindfulness-Based Strategies    Discuss skills based on development and application of mindfulness.    Skills drawn from compassion-focused therapy, dialectical behavior therapy, mindfulness-based stress reduction, mindfulness-based cognitive therapy, etc.      We have developed these goals together during our work to this point. Patient has assisted in the development of these goals and has agreed to this treatment plan.       Joseph Murillo, RED  August 2, 2021

## 2021-10-20 ENCOUNTER — HOSPITAL ENCOUNTER (OUTPATIENT)
Dept: CARDIAC REHAB | Facility: CLINIC | Age: 56
End: 2021-10-20
Attending: PHYSICIAN ASSISTANT
Payer: MEDICARE

## 2021-10-20 PROCEDURE — 93798 PHYS/QHP OP CAR RHAB W/ECG: CPT

## 2021-10-22 ENCOUNTER — HOSPITAL ENCOUNTER (OUTPATIENT)
Dept: CARDIAC REHAB | Facility: CLINIC | Age: 56
End: 2021-10-22
Attending: PHYSICIAN ASSISTANT
Payer: MEDICARE

## 2021-10-22 PROCEDURE — 93798 PHYS/QHP OP CAR RHAB W/ECG: CPT

## 2021-10-25 ENCOUNTER — HOSPITAL ENCOUNTER (OUTPATIENT)
Dept: CARDIAC REHAB | Facility: CLINIC | Age: 56
End: 2021-10-25
Attending: PHYSICIAN ASSISTANT
Payer: MEDICARE

## 2021-10-25 PROCEDURE — 93798 PHYS/QHP OP CAR RHAB W/ECG: CPT

## 2021-10-27 ENCOUNTER — HOSPITAL ENCOUNTER (OUTPATIENT)
Dept: CARDIAC REHAB | Facility: CLINIC | Age: 56
End: 2021-10-27
Attending: PHYSICIAN ASSISTANT
Payer: MEDICARE

## 2021-10-27 PROCEDURE — 93798 PHYS/QHP OP CAR RHAB W/ECG: CPT

## 2021-10-29 ENCOUNTER — HOSPITAL ENCOUNTER (OUTPATIENT)
Dept: CARDIAC REHAB | Facility: CLINIC | Age: 56
End: 2021-10-29
Attending: PHYSICIAN ASSISTANT
Payer: MEDICARE

## 2021-10-29 PROCEDURE — 93798 PHYS/QHP OP CAR RHAB W/ECG: CPT

## 2021-11-01 ENCOUNTER — HOSPITAL ENCOUNTER (OUTPATIENT)
Dept: CARDIAC REHAB | Facility: CLINIC | Age: 56
End: 2021-11-01
Attending: PHYSICIAN ASSISTANT
Payer: MEDICARE

## 2021-11-01 PROCEDURE — 93798 PHYS/QHP OP CAR RHAB W/ECG: CPT

## 2021-11-02 ENCOUNTER — ALLIED HEALTH/NURSE VISIT (OUTPATIENT)
Dept: TRANSPLANT | Facility: CLINIC | Age: 56
End: 2021-11-02
Attending: INTERNAL MEDICINE
Payer: MEDICARE

## 2021-11-02 ENCOUNTER — LAB (OUTPATIENT)
Dept: LAB | Facility: CLINIC | Age: 56
End: 2021-11-02
Payer: MEDICARE

## 2021-11-02 ENCOUNTER — TELEPHONE (OUTPATIENT)
Dept: PHARMACY | Facility: CLINIC | Age: 56
End: 2021-11-02

## 2021-11-02 DIAGNOSIS — N18.4 ANEMIA DUE TO STAGE 4 CHRONIC KIDNEY DISEASE (H): Primary | ICD-10-CM

## 2021-11-02 DIAGNOSIS — D63.1 ANEMIA DUE TO STAGE 4 CHRONIC KIDNEY DISEASE (H): Primary | ICD-10-CM

## 2021-11-02 DIAGNOSIS — N18.32 ANEMIA OF CHRONIC RENAL FAILURE, STAGE 3B (H): ICD-10-CM

## 2021-11-02 DIAGNOSIS — D63.1 ANEMIA OF CHRONIC RENAL FAILURE, STAGE 3B (H): ICD-10-CM

## 2021-11-02 DIAGNOSIS — N18.32 STAGE 3B CHRONIC KIDNEY DISEASE (H): ICD-10-CM

## 2021-11-02 LAB
FERRITIN SERPL-MCNC: 479 NG/ML (ref 26–388)
HCT VFR BLD AUTO: 35.7 % (ref 40–53)
HGB BLD-MCNC: 11.3 G/DL (ref 13.3–17.7)
HOLD SPECIMEN: NORMAL
IRON SATN MFR SERPL: 45 % (ref 15–46)
IRON SERPL-MCNC: 109 UG/DL (ref 35–180)
TIBC SERPL-MCNC: 241 UG/DL (ref 240–430)

## 2021-11-02 PROCEDURE — 82728 ASSAY OF FERRITIN: CPT | Performed by: PATHOLOGY

## 2021-11-02 PROCEDURE — 83550 IRON BINDING TEST: CPT | Performed by: PATHOLOGY

## 2021-11-02 PROCEDURE — 85018 HEMOGLOBIN: CPT | Performed by: PATHOLOGY

## 2021-11-02 PROCEDURE — 85014 HEMATOCRIT: CPT | Performed by: PATHOLOGY

## 2021-11-02 PROCEDURE — 36415 COLL VENOUS BLD VENIPUNCTURE: CPT | Performed by: PATHOLOGY

## 2021-11-02 NOTE — NURSING NOTE
Pt's hemoglobin was 11.3 today per therapy plan, the injection was not given and pt was sent home    Oriana MATTHEWS CMA

## 2021-11-02 NOTE — TELEPHONE ENCOUNTER
Anemia Management Note  SUBJECTIVE/OBJECTIVE:  Referred by Dr. Eloy Rao on 2021  Primary Diagnosis: Anemia in Chronic Kidney Disease (N18.3, D63.1)   3b  Secondary Diagnosis:  Chronic Kidney Disease, Stage 3 (N18.3)  3b  Liver Tx: 2019  Hgb goal range:  9-10  Epo/Darbo: Aranesp 60mcg every 14 days for Hgb <10.  In Clinic.  *dose increased to 60mcg starting with  21 dose  Iron regimen:  NA.  Iron levels stable  Labs : 2022  Recent IVELISSE use, transfusion, IV iron: Aranesp   RX/TX plans :  6/10/2022     No history of stroke, MI and blood clots.  History of hepatocellular carcinoma - s/p TACE 2019 and liver transplant 2019.     Contact:            Ok to leave message regarding scheduling, medical, and billing per consent to communicate dated 10/2/19                              OK to speak with Davi HurstSaunders (friend/POA) regarding scheduling, medical, and billing per consent to communicate dated 10/2/19    Anemia Latest Ref Rng & Units 2021 2021 2021 10/4/2021 10/5/2021 10/19/2021 2021   IVELISSE Dose - 60 mcg 60 mcg - - - - -   Hemoglobin 13.3 - 17.7 g/dL 8.7(L) 9.6(L) 9.8(L) 10.4(L) 10.1(L) 11.6(L) 11.3(L)   TSAT 15 - 46 % - - - - - - 45   Ferritin 26 - 388 ng/mL - - - - - - 479(H)   PRBCs - - - - - - - -     BP Readings from Last 3 Encounters:   10/04/21 137/80   21 127/78   21 121/76     Wt Readings from Last 2 Encounters:   10/04/21 171 lb (77.6 kg)   21 155 lb 9.6 oz (70.6 kg)           ASSESSMENT:  Hgb:Above goal - recommend hold dose  TSat: at goal >30% Ferritin: At goal (>100ng/mL)    PLAN:  Hold Aranesp and RTC for hgb then aranesp if needed in 2 week(s). If Hgb stable in 2 weeks, change labs to monthly.     Orders needed to be renewed (for next follow-up date) in EPIC: None    Iron labs due:  4 weeks    Plan discussed with:  No call  Plan provided by:  adri    NEXT FOLLOW-UP DATE:  21  11a appt    Jie Blum RN   Anemia  [address] [address]   [email or web address]   Office : [phone]  Fax: [phone]

## 2021-11-05 ENCOUNTER — HOSPITAL ENCOUNTER (OUTPATIENT)
Dept: CARDIAC REHAB | Facility: CLINIC | Age: 56
End: 2021-11-05
Attending: PHYSICIAN ASSISTANT
Payer: MEDICARE

## 2021-11-05 PROCEDURE — 93798 PHYS/QHP OP CAR RHAB W/ECG: CPT

## 2021-11-08 ENCOUNTER — HOSPITAL ENCOUNTER (OUTPATIENT)
Dept: CARDIAC REHAB | Facility: CLINIC | Age: 56
End: 2021-11-08
Attending: PHYSICIAN ASSISTANT
Payer: MEDICARE

## 2021-11-08 PROCEDURE — 93798 PHYS/QHP OP CAR RHAB W/ECG: CPT

## 2021-11-10 ENCOUNTER — HOSPITAL ENCOUNTER (OUTPATIENT)
Dept: CARDIAC REHAB | Facility: CLINIC | Age: 56
End: 2021-11-10
Attending: PHYSICIAN ASSISTANT
Payer: MEDICARE

## 2021-11-10 PROCEDURE — 93798 PHYS/QHP OP CAR RHAB W/ECG: CPT

## 2021-11-12 ENCOUNTER — HOSPITAL ENCOUNTER (OUTPATIENT)
Dept: CARDIAC REHAB | Facility: CLINIC | Age: 56
End: 2021-11-12
Attending: PHYSICIAN ASSISTANT
Payer: MEDICARE

## 2021-11-12 PROCEDURE — 93798 PHYS/QHP OP CAR RHAB W/ECG: CPT

## 2021-11-12 NOTE — PROGRESS NOTES
"      HPI:       Frandy Workman is a 52 year old male who presents for the following  Patient presents with: Diabetes (\" My sugars having been running real high.\")      Patient is here for a diabetes follow-up regarding recent high sugars. In the past 7 days patient has been averaging in the 300's, with the range from 180-420. Over the past 60 days he has been averaging in the mid 200's. Patients current regiment is 110 units of long acting insulin in AM, has breakfast an adjusts insulin based on carbs - usually 30-39 units, he does not eat lunch but takes about 10 units, has an afternoon snack, eats dinner around 5 due to being in assisted living, then has multiple late night snacks. His sugars are around 400 in morning, 300 at lunch, 180 at dinner, then around 225 at night. For his nighttime snacks he has one around 10 pm for which has takes insulin based on carbs and then typically 2 additional snacks after that for which he does not adjust or take insulin. These snacks are typically processed foods, such as ritz crackers that are high in carbs. He eats these snacks because he gets restless at night and craves food. We discussed that patient could eat one larger meal at 10pm for which he adjusts insulin and to make meal more protein based as to get more satiety, and then not have additional snacks afterwards. He recently saw endocrine in late Februrary who also recommended he cut out nighttime snacks, in addition they recommended he start 500 Metformin, for which he took his first dose yesterday. They want him to follow-up 7 days after starting and then consider going to 850 at that point.    Patient also endorses nighttime confusion, trouble finding words. These started with the high sugars. However, patient also has hx of liver cirrhosis and is on lactulose. He has 4 BM per day. Patient was encouraged to take an additional lactulose as these symptoms of confusion present themselves.     Problem, Medication " Pt. Medicated per orders, see MAR  Pt. Resting on stretcher, eyes open, RR even and non-labored  Pt.  Updated on POC  Will continue to monitor   Friend remains at bedside      Diamante RodriguezrSIDDHARTHA  11/12/21 2313 and Allergy Lists were   reviewed and are current.     Patient Active Problem List    Diagnosis Date Noted     Liver cirrhosis secondary to ESTRADA (H)      Priority: Medium     Type II diabetes mellitus (H)      Priority: Medium         Current Outpatient Prescriptions   Medication Sig Dispense Refill     spironolactone (ALDACTONE) 25 MG tablet Take 1 tablet (25 mg) by mouth daily 90 tablet 3     metFORMIN (GLUCOPHAGE-XR) 500 MG 24 hr tablet Take 1 tablet (500 mg) by mouth daily (with dinner) 30 tablet 3     rifaximin (XIFAXAN) 550 MG TABS tablet Take 1 tablet (550 mg) by mouth 2 times daily 60 tablet 11     insulin degludec (TRESIBA) 200 UNIT/ML pen Take 110 units daily. 18 mL 11     insulin aspart (NOVOLOG FLEXPEN) 100 UNIT/ML injection 1 unit per 4 Gms CHO at meals and snacks + correction scale of 1 unit per 25 mg/dL over 125. Average daily dose is 75 units. 24 mL 11     OLANZapine (ZYPREXA) 2.5 MG tablet Take 1 tablet (2.5 mg) by mouth At Bedtime 30 tablet 3     blood glucose monitoring (ACCU-CHEK EDINSON PLUS) test strip Use to test blood sugar 4 times daily 400 each 3     blood glucose monitoring (ACCU-CHEK FASTCLIX) lancets Use to test blood sugar 4 times daily or as directed.  1 box = 102 lancets 3 Box 3     tadalafil (CIALIS) 5 MG tablet Take 1 tablet (5 mg) by mouth as needed for erectile dysfunction 20 tablet 1     carvedilol (COREG) 12.5 MG tablet Take 1 tablet (12.5 mg) by mouth 2 times daily (with meals) 180 tablet 3     simvastatin (ZOCOR) 20 MG tablet Take 1 tablet (20 mg) by mouth At Bedtime 90 tablet 3     sildenafil (VIAGRA) 50 MG tablet Take 0.5-1 tablets (25-50 mg) by mouth daily as needed for erectile dysfunction Take 30 min to 4 hours before intercourse 12 tablet 11     aspirin 81 MG tablet Take 1 tablet (81 mg) by mouth daily 90 tablet 3     cyanocobalamin (RA VITAMIN B-12 TR) 1000 MCG TBCR Take 1,000 mcg by mouth daily       lactulose (CHRONULAC) 10 GM/15ML solution Take 60 mLs by mouth 4 times  daily       omeprazole (PRILOSEC) 40 MG capsule Take 40 mg by mouth daily       insulin pen needle (BD VIKTORIA U/F) 32G X 4 MM U 6 D       potassium chloride SA (K-DUR,KLOR-CON M) 20 MEQ tablet Take 40 mEq by mouth every morning       tamsulosin (FLOMAX) 0.4 MG 24 hr capsule Take 0.4 mg by mouth daily       Multiple Vitamin (THERAVITE PO) Take 1 tablet by mouth daily       [DISCONTINUED] spironolactone (ALDACTONE) 25 MG tablet Take 25 mg by mouth daily         No Known Allergies  Patient is an established patient of this clinic.         Review of Systems:   Review of Systems     Constitutional:  Positive for confused. Negative for chills, fatigue and night sweats.   HENT:  Negative for ear pain, hearing loss, taste disturbance and sinus congestion.    Eyes:  Negative for double vision, pain, eye pain, decreased vision, eye dryness and tunnel vision.   Respiratory:   Negative for cough, shortness of breath and dyspnea on exertion.    Cardiovascular:  Negative for chest pain, dyspnea on exertion, palpitations, claudication and chest pain on exertion.   Gastrointestinal:  Positive for diarrhea. Negative for abdominal pain, constipation, blood in stool and bloating.             Physical Exam:   /76 (BP Location: Right arm, Patient Position: Chair, Cuff Size: Adult Regular)  Pulse 77  Resp 20  Wt 87.5 kg (193 lb)  SpO2 100%  BMI 28.5 kg/m2  Body mass index is 28.5 kg/(m^2).  Vitals were reviewed     Physical Exam   Constitutional: He is well-developed, well-nourished, and in no distress. No distress.   Eyes: Conjunctivae and EOM are normal. Pupils are equal, round, and reactive to light. Right eye exhibits no discharge. Left eye exhibits no discharge. No scleral icterus.   Cardiovascular: Normal rate, regular rhythm, normal heart sounds and intact distal pulses.  Exam reveals no gallop and no friction rub.    No murmur heard.  Pulmonary/Chest: Effort normal and breath sounds normal. No respiratory distress. He has no  wheezes.   Abdominal: He exhibits no distension. There is no tenderness.   Lymphadenopathy:     He has no cervical adenopathy.   Skin: He is not diaphoretic.           Results:     No labs today  Assessment and Plan     Frandy was seen today for diabetes.    Diagnoses and all orders for this visit:    Diabetes.   Patient was encouraged to eat one nighttime meal, high in protein, that he adjusts and takes insulin for. Instead of multiple small meals of processed carbs for which he only adjusts for the first snack. Leave medication changes to endocrine, patient is following up with them in 1 week since he started Metformin yesterday.    Other ascites  -     spironolactone (ALDACTONE) 25 MG tablet; Take 1 tablet (25 mg) by mouth daily        Options for treatment and follow-up care were reviewed with the patient. Frandy Workman engaged in the decision making process and verbalized understanding of the options discussed and agreed with the final plan.    Scribe Disclosure:   I, Ruiz Orosco, am serving as a scribe; to document services personally performed by Natalie Russell MD  - -based on data collection and the provider's statements to me.     Provider Disclosure:  I agree with above History, Review of Systems, Physical exam and Plan.  I have reviewed the content of the documentation and have edited it as needed. I have personally performed the services documented here and the documentation accurately represents those services and the decisions I have made.        Natalie Russell MD  Mar 9, 2017

## 2021-11-15 ENCOUNTER — HOSPITAL ENCOUNTER (OUTPATIENT)
Dept: CARDIAC REHAB | Facility: CLINIC | Age: 56
End: 2021-11-15
Attending: PHYSICIAN ASSISTANT
Payer: MEDICARE

## 2021-11-15 PROCEDURE — 93798 PHYS/QHP OP CAR RHAB W/ECG: CPT

## 2021-11-16 ENCOUNTER — TELEPHONE (OUTPATIENT)
Dept: PHARMACY | Facility: CLINIC | Age: 56
End: 2021-11-16

## 2021-11-16 ENCOUNTER — VIRTUAL VISIT (OUTPATIENT)
Dept: BEHAVIORAL HEALTH | Facility: CLINIC | Age: 56
End: 2021-11-16
Payer: MEDICARE

## 2021-11-16 ENCOUNTER — ALLIED HEALTH/NURSE VISIT (OUTPATIENT)
Dept: TRANSPLANT | Facility: CLINIC | Age: 56
End: 2021-11-16
Attending: INTERNAL MEDICINE
Payer: MEDICARE

## 2021-11-16 ENCOUNTER — LAB (OUTPATIENT)
Dept: LAB | Facility: CLINIC | Age: 56
End: 2021-11-16
Attending: INTERNAL MEDICINE
Payer: MEDICARE

## 2021-11-16 VITALS — SYSTOLIC BLOOD PRESSURE: 103 MMHG | DIASTOLIC BLOOD PRESSURE: 63 MMHG | HEART RATE: 85 BPM

## 2021-11-16 DIAGNOSIS — D63.1 ANEMIA OF CHRONIC RENAL FAILURE, STAGE 3B (H): ICD-10-CM

## 2021-11-16 DIAGNOSIS — N18.32 ANEMIA OF CHRONIC RENAL FAILURE, STAGE 3B (H): ICD-10-CM

## 2021-11-16 DIAGNOSIS — N18.32 STAGE 3B CHRONIC KIDNEY DISEASE (H): Primary | ICD-10-CM

## 2021-11-16 DIAGNOSIS — F31.31 BIPOLAR 1 DISORDER, DEPRESSED, MILD (H): Primary | ICD-10-CM

## 2021-11-16 DIAGNOSIS — F41.1 GENERALIZED ANXIETY DISORDER: ICD-10-CM

## 2021-11-16 DIAGNOSIS — N18.32 STAGE 3B CHRONIC KIDNEY DISEASE (H): ICD-10-CM

## 2021-11-16 LAB
FERRITIN SERPL-MCNC: 400 NG/ML (ref 26–388)
HCT VFR BLD AUTO: 28.8 % (ref 40–53)
HGB BLD-MCNC: 9.2 G/DL (ref 13.3–17.7)
IRON SATN MFR SERPL: 44 % (ref 15–46)
IRON SERPL-MCNC: 93 UG/DL (ref 35–180)
TIBC SERPL-MCNC: 211 UG/DL (ref 240–430)

## 2021-11-16 PROCEDURE — 90832 PSYTX W PT 30 MINUTES: CPT | Mod: 95 | Performed by: PSYCHOLOGIST

## 2021-11-16 PROCEDURE — 82728 ASSAY OF FERRITIN: CPT | Performed by: PATHOLOGY

## 2021-11-16 PROCEDURE — 83550 IRON BINDING TEST: CPT | Performed by: PATHOLOGY

## 2021-11-16 PROCEDURE — 85018 HEMOGLOBIN: CPT | Performed by: PATHOLOGY

## 2021-11-16 PROCEDURE — 250N000011 HC RX IP 250 OP 636: Mod: EC | Performed by: INTERNAL MEDICINE

## 2021-11-16 PROCEDURE — 85014 HEMATOCRIT: CPT | Performed by: PATHOLOGY

## 2021-11-16 PROCEDURE — 96372 THER/PROPH/DIAG INJ SC/IM: CPT | Performed by: INTERNAL MEDICINE

## 2021-11-16 PROCEDURE — 36415 COLL VENOUS BLD VENIPUNCTURE: CPT | Performed by: PATHOLOGY

## 2021-11-16 RX ADMIN — DARBEPOETIN ALFA 60 MCG: 60 INJECTION, SOLUTION INTRAVENOUS; SUBCUTANEOUS at 11:45

## 2021-11-16 NOTE — PROGRESS NOTES
[facility]: Integrated Behavioral Health  November 16, 2021      Behavioral Health Clinician Progress Note    Patient Name: Frandy Workman           Service Type: Video visit      Service Location:  Video visit      Session Start Time: 2:09  Session End Time:  2:43      Session Length: 16 - 37      Attendees: Patient    Visit Activities (Refresh list every visit): [facility] Only     Telemedicine Visit: The patient's condition can be safely assessed and treated via synchronous audio and visual telemedicine encounter.      Reason for Telemedicine Visit: COVID-19    Originating Site (Patient Location): Patient's home    Distant Site (Provider Location): Provider Remote Setting    Consent:  The patient/guardian has verbally consented to: the potential risks and benefits of telemedicine (video visit) versus in person care; bill my insurance or make self-payment for services provided; and responsibility for payment of non-covered services.     Mode of Communication:  Video Conference via liveBooks    As the provider I attest to compliance with applicable laws and regulations related to telemedicine.    Diagnostic Assessment Date:  12/03/2020  Treatment Plan Review Date:  2/16/2022  See Flowsheets for today's PHQ-9 and LIZZETTE-7 results  Previous PHQ-9:   PHQ-9 SCORE 12/29/2020 4/7/2021 9/30/2021   PHQ-9 Total Score MyChart 5 (Mild depression) 7 (Mild depression) -   PHQ-9 Total Score 5 7 10     Previous LIZZETTE-7:   LIZZETTE-7 SCORE 12/29/2020 4/7/2021 9/30/2021   Total Score 8 (mild anxiety) 9 (mild anxiety) -   Total Score 8 9 10       Extended Session (60+ minutes): No  Interactive Complexity: No  Crisis: No    Treatment Objective(s) Addressed in This Session:  Target Behavior(s): disease management/lifestyle changes  related to adaptive approaches to managing anxious distress       Current Stressors / Issues:  [facility] met with Miller vieyra over telemedicine to continue supporting his treatment of mood  difficulties and adjusting to chronic disease difficulties. Miller presents today with sharing that he learned his father  from pneumonia recently. We explored the emotions he is feeling right now as well as discussed his relationship with his father, how they historically were close, but became estranged later in life. Miller described the sense of loss he experiences, noting how he struggles with finding close social contacts lately. [facility] continued to provide Miller supportive and exploratory/insight-oriented counseling today. Discussed ideas related to grief and self-care in this context as well.     Progress on Treatment Objective(s) / Homework:  Satisfactory progress - ACTION (Actively working towards change); Intervened by reinforcing change plan / affirming steps taken      Solution-Focused Therapy    Explore patterns in patient's relationships and discuss options for new behaviors.    Explore patterns in patient's actions and choices and discuss options for new behaviors.    Behavioral Activation    Discuss steps patient can take to become more involved in meaningful activity.    Identify barriers to these activities and explore possible solutions.    Narrative Therapy    Explore the patient's story of his/her life from his/her perspective.    Explore alternate ways of understanding their experience, identifying exceptions, developing new themes.      Answers for HPI/ROS submitted by the patient on 2021   LIZZETTE 7 TOTAL SCORE: 9  If you checked off any problems, how difficult have these problems made it for you to do your work, take care of things at home, or get along with other people?: Very difficult  PHQ9 TOTAL SCORE: 7*    *No SI    Answers for HPI/ROS submitted by the patient on 10/13/2020   If you checked off any problems, how difficult have these problems made it for you to do your work, take care of things at home, or get along with other people?: Somewhat difficult  PHQ9 TOTAL SCORE: 8*  LIZZETTE 7 TOTAL  SCORE: 6      *No SI     Answers for HPI/ROS submitted by the patient on 12/29/2020   If you checked off any problems, how difficult have these problems made it for you to do your work, take care of things at home, or get along with other people?: Somewhat difficult  PHQ9 TOTAL SCORE: 5*  LIZZETTE 7 TOTAL SCORE: 8    *No SI       Care Plan review completed: yes    Medication Review:  No changes to current psychiatric medication(s)     Current Outpatient Medications   Medication     Acetaminophen (TYLENOL) 325 MG CAPS     ARIPiprazole (ABILIFY) 5 MG tablet     aspirin (SM ASPIRIN ADULT LOW STRENGTH) 81 MG EC tablet     BD VIKTORIA U/F 32G X 4 MM insulin pen needle     ciclopirox (LOPROX) 0.77 % cream     Continuous Blood Gluc  (FREESTYLE CLAUDIA 14 DAY READER) CECILIO     Continuous Blood Gluc Sensor (OnAsset IntelligenceSTYLE CLAUDIA 14 DAY SENSOR) MISC     cyanocobalamin (VITAMIN B-12) 1000 MCG tablet     empagliflozin (JARDIANCE) 10 MG TABS tablet     insulin aspart (NOVOLOG PEN) 100 UNIT/ML pen     lamiVUDine (EPIVIR) 100 MG tablet     lisinopril (ZESTRIL) 5 MG tablet     methocarbamol (ROBAXIN) 750 MG tablet     metoprolol succinate ER (TOPROL-XL) 25 MG 24 hr tablet     Multiple Vitamin (TAB-A-GLADIS) TABS     mycophenolate (GENERIC EQUIVALENT) 250 MG capsule     order for DME     oxyCODONE (ROXICODONE) 5 MG tablet     pantoprazole (PROTONIX) 40 MG EC tablet     polyethylene glycol (MIRALAX) 17 g packet     predniSONE (DELTASONE) 5 MG tablet     rosuvastatin (CRESTOR) 5 MG tablet     senna-docusate (SENOKOT-S/PERICOLACE) 8.6-50 MG tablet     tamsulosin (FLOMAX) 0.4 MG capsule     vitamin D3 (CHOLECALCIFEROL) 50 mcg (2000 units) tablet     Current Facility-Administered Medications   Medication     aflibercept (EYLEA) injection prefilled syringe 2 mg     aflibercept (EYLEA) injection prefilled syringe 2 mg     Facility-Administered Medications Ordered in Other Visits   Medication     darbepoetin elias-polysorbate (ARANESP) injection 60 mcg          Medication Compliance:  Yes    Changes in Health Issues:   None reported    Chemical Use Review:   Substance Use: Chemical use reviewed, no active concerns identified      Tobacco Use: No current tobacco use.         Assessment: Current Emotional / Mental Status (status of significant symptoms):  Risk status (Self / Other harm or suicidal ideation)  Patient denies a history of suicidal ideation, suicide attempts, self-injurious behavior, homicidal ideation, homicidal behavior and and other safety concerns     Patient denies current fears or concerns for personal safety.  Patient denies current or recent suicidal ideation or behaviors.  Patient denies current or recent homicidal ideation or behaviors.  Patient denies current or recent self injurious behavior or ideation.  Patient denies other safety concerns.     A safety and risk management plan has not been developed at this time, however patient was encouraged to call [place] [phone] should there be a change in any of these risk factors.    [place] Suicide Severity Rating Scale (Short Version)  [place] Suicide Severity Rating (Short Version) 1/24/2019 11/10/2019 12/2/2019 12/10/2019 6/11/2020 7/1/2020 7/12/2021   Over the past 2 weeks have you felt down, depressed, or hopeless? - no yes yes no no no   Over the past 2 weeks have you had thoughts of killing yourself? - no yes no no no no   Comments - - lots of stressors, has thought about not taking meds - - - -   Have you ever attempted to kill yourself? - no no no no no no   Q1 Wished to be Dead (Past Month) no - other (see comments) no - - -   Comments - - why did i do this surgery - - - -   Q2 Suicidal Thoughts (Past Month) no - no no - - -   Comments - - wishes he wouldn't have gone through surgery - - - -   Q3 Suicidal Thought Method - - no no - - -   Q4 Suicidal Intent without Specific Plan - - no no - - -   Q5 Suicide Intent with Specific Plan - - no no - - -   Q6 Suicide Behavior (Lifetime) no  - no no - - -         Appearance:   Appropriate   Eye Contact:   Good   Psychomotor Behavior: Restless   Attitude:   Cooperative  Interested Friendly Pleasant  Orientation:   All  Speech   Rate / Production: Normal/ Responsive   Volume:  Normal   Mood:    Normal  Affect:    Appropriate    Thought Content:  Clear   Thought Form:  Goal Directed  Logical   Insight:    Good     Diagnoses:  1. Bipolar 1 disorder, depressed, mild (H)    2. Generalized anxiety disorder          Collateral Reports Completed:  Not Applicable    Plan: (Homework, other):  Continue with this writer for individual psychotherapy in three weeks. He will work on managing anxiety through adaptive behavior change, like tolerable/safe exercise/movement and engaging with social support contacts.     Joseph Murillo, RED  11/16/2021          ______________________________________________________________________    [facility] Integrated Behavioral Health Treatment Plan    Client's Name: Frandy Workman  YOB: 1965    Date: 11/16/2021      DSM-V Diagnoses: 296.51 Bipolar I Disorder Current or Most Recent Episode Depressed, Mild;     Clinical Global Impressions  First:  Considering your total clinical experience with this particular patient population, how severe are the patient's symptoms at this time?: 3 (11/29/2021  8:49 AM)      Most recent:  Compared to the patient's condition at the START of treatment, this patient's condition is: 2 (11/29/2021  8:49 AM)        Referral / Collaboration:  Will collaborate with care team as indicated during treatment.    Anticipated number of session or this episode of care: 10+      MeasurableTreatment Goal(s) related to diagnosis / functional impairment(s)  Goal 1:  Patient will experience a reduction in depressive and anxious symptoms, along with a corresponding increase in positive emotion and life satisfaction.    Objective #A: Patient will experience a reduction in depressed mood, will develop more  effective coping skills to manage depressive symptoms, will develop healthy cognitive patterns and beliefs, will increase ability to function adaptively and will continue to take medications as prescribed / participate in supportive activities and services    Status: Continued - Date(s): 11/16/2021    Objective #B: Patient will experience a reduction in anxiety, will develop more effective coping skills to manage anxiety symptoms, will develop healthy cognitive patterns and beliefs and will increase ability to function adaptively  Status: Continued - Date(s): 11/16/2021    Objective #C: Patient will develop better understanding of triggers and coping strategies to stabilize mood  Status: Continued - Date(s): 11/16/2021    Goal 2:  Patient will identify and increase engagement in valued activity, i.e. improving social connections/relationships, pursuing occupational goals or personally meaningful pursuits, exploration of meaning in life.     Objective #A: Patient will identify meaningful activity in social, occupational and  personal goals, and increase behavioral activation around these goals   Status: Continued - Date(s): 11/16/2021    Objective #B: Patient will address relationship difficulties in a more adaptive manner  Status: Continued - Date(s): 11/16/2021    Objective #C: Patient will develop coping/problem-solving skills to facilitate more adaptive adjustment and will effectively address problems that interfere with adaptive functioning  Status: Continued - Date(s): 11/16/2021        Possible Therapeutic Intervention(s)  Psycho-education regarding mental health diagnoses and treatment options    Skills training    Explore skills useful to client in current situation.    Skills include assertiveness, communication, conflict management, problem-solving, relaxation, etc.    Solution-Focused Therapy    Explore patterns in patient's relationships and discuss options for new behaviors.    Explore patterns in  patient's actions and choices and discuss options for new behaviors.    Cognitive-behavioral Therapy    Discuss common cognitive distortions, identify them in patient's life.    Explore ways to challenge, replace, and act against these cognitions.    Acceptance and Commitment Therapy    Explore and identify important values in patient's life.    Discuss ways to commit to behavioral activation around these values.    Psychodynamic psychotherapy    Discuss patient's emotional dynamics and issues and how they impact behaviors.    Explore patient's history of relationships and how they impact present behaviors.    Explore how to work with and make changes in these schemas and patterns.    Narrative Therapy    Explore the patient's story of his/her life from his/her perspective.    Explore alternate ways of understanding their experience, identifying exceptions, developing new themes.    Interpersonal Psychotherapy    Explore patterns in relationships that are effective or ineffective at helping patient reach their goals, find satisfying experience.    Discuss new patterns or behaviors to engage in for improved social functioning.    Behavioral Activation    Discuss steps patient can take to become more involved in meaningful activity.    Identify barriers to these activities and explore possible solutions.    Mindfulness-Based Strategies    Discuss skills based on development and application of mindfulness.    Skills drawn from compassion-focused therapy, dialectical behavior therapy, mindfulness-based stress reduction, mindfulness-based cognitive therapy, etc.      We have developed these goals together during our work to this point. Patient has assisted in the development of these goals and has agreed to this treatment plan.       Joseph Murillo LP  11/16/2021

## 2021-11-16 NOTE — TELEPHONE ENCOUNTER
Anemia Management Note  SUBJECTIVE/OBJECTIVE:  Referred by Dr. Eloy Rao on 2021  Primary Diagnosis: Anemia in Chronic Kidney Disease (N18.3, D63.1)   3b  Secondary Diagnosis:  Chronic Kidney Disease, Stage 3 (N18.3)  3b  Liver Tx: 2019  Hgb goal range:  9-10  Epo/Darbo: Aranesp 60mcg every 14 days for Hgb <10.  In Clinic.  *dose increased to 60mcg starting with  21 dose  Iron regimen:  NA.  Iron levels stable  Labs : 2022  Recent IVELISSE use, transfusion, IV iron: Aranesp   RX/TX plans :  6/10/2022     No history of stroke, MI and blood clots.  History of hepatocellular carcinoma - s/p TACE 2019 and liver transplant 2019.     Contact:            Ok to leave message regarding scheduling, medical, and billing per consent to communicate dated 10/2/19                              OK to speak with Davi Saunders (friend/POA) regarding scheduling, medical, and billing per consent to communicate dated 10/2/19    Anemia Latest Ref Rng & Units 2021 2021 10/4/2021 10/5/2021 10/19/2021 2021 2021   IVELISSE Dose - 60 mcg - - - - - 60 mcg   Hemoglobin 13.3 - 17.7 g/dL 9.6(L) 9.8(L) 10.4(L) 10.1(L) 11.6(L) 11.3(L) 9.2(L)   TSAT 15 - 46 % - - - - - 45 44   Ferritin 26 - 388 ng/mL - - - - - 479(H) 400(H)   PRBCs - - - - - - - -     BP Readings from Last 3 Encounters:   21 103/63   10/04/21 137/80   21 127/78     Wt Readings from Last 2 Encounters:   10/04/21 171 lb (77.6 kg)   21 155 lb 9.6 oz (70.6 kg)           ASSESSMENT:  Hgb:at goal - received dose in clinic - recommend continue current regimen  TSat: at goal >30% Ferritin: At goal (>100ng/mL)    PLAN:  Dose with aranesp and RTC for hgb then aranesp if needed in 2 week(s)    Orders needed to be renewed (for next follow-up date) in EPIC: None    Iron labs due:  4 weeks    Plan discussed with:  NO call  Plan provided by:  adri    NEXT FOLLOW-UP DATE:  21  11a appt    Jie Blum RN   Anemia  [address] [address]   [email or web address]   Office : [phone]  Fax: [phone]

## 2021-11-16 NOTE — PROGRESS NOTES
Frequency: Every 14 days  Most recent or today's HGB: 9.2   Date: 21  Date of lat dose: 21  HGB associated with last dose given: 9.6    Blood Pressure:103/63    Diagnosis: Anemia    Ordered by: Eloy Downey MD  VIS Offered: Yes    Double Checked by: Oriana NIELSON CMA    See MAR for administration details    Pt's first name, last name and  verified prior to medication administration, injection given without complications or questions.     Nereida Steiner CMA

## 2021-11-17 ENCOUNTER — HOSPITAL ENCOUNTER (OUTPATIENT)
Dept: CARDIAC REHAB | Facility: CLINIC | Age: 56
End: 2021-11-17
Attending: PHYSICIAN ASSISTANT
Payer: MEDICARE

## 2021-11-19 ENCOUNTER — TELEPHONE (OUTPATIENT)
Dept: PHARMACY | Facility: CLINIC | Age: 56
End: 2021-11-19
Payer: MEDICARE

## 2021-11-19 ENCOUNTER — HOSPITAL ENCOUNTER (OUTPATIENT)
Dept: CARDIAC REHAB | Facility: CLINIC | Age: 56
End: 2021-11-19
Attending: PHYSICIAN ASSISTANT
Payer: MEDICARE

## 2021-11-19 PROCEDURE — 93798 PHYS/QHP OP CAR RHAB W/ECG: CPT

## 2021-11-19 NOTE — TELEPHONE ENCOUNTER
Clinical Pharmacy Consult:                                                      Transplant Specific:  24 Month Post Transplant Medication Call  Date of Transplant: 11/11/2019  Type of Transplant: liver  First Transplant: yes  History of rejection: no    Immunosuppression Regimen   MMF 750mg qAM & 750mg qPM and Prednisone 5mg qAM  Patient specific goal: Not followed  Pt adherent to lab draws: yes  Scr:   Lab Results   Component Value Date    CR 1.16 05/21/2020     Side effects: None    Prophylactic Medications  Antibacterial:  Completed    Antifungal: Not needed thus far    Antiviral: Valcyte completed     Acid Reducer: Protonix (Pantoprazole)  Scheduled Reviewed Date: up to MD    Thrombosis Prevention: Aspirin 81 mg PO daily  Scheduled Discontinue Date: Therapy completed    Blood Pressure Management  Frequency of home Blood Pressure checks: Once daily  Most recent home BP: 150-60 to 100/50  Patient Blood pressure goal: <140/90  Patient blood pressure at goal:  yes  Hospitalizations/ER visits since last assessment: 1 (CABG on 7/14/21)  Med rec/DUR performed: yes  Med Rec Discrepancies: No    Miller is doing well.  He had a CABG in July and will be completing cardiac rehab at the end of next week.  His liver is doing well.  He is working with endocrinology on his blood sugars and utilizing his CGM.      No other questions or concerns today.    Sera Moncada, PharmD

## 2021-11-22 ENCOUNTER — HOSPITAL ENCOUNTER (OUTPATIENT)
Dept: CARDIAC REHAB | Facility: CLINIC | Age: 56
End: 2021-11-22
Attending: PHYSICIAN ASSISTANT
Payer: MEDICARE

## 2021-11-22 PROCEDURE — 93798 PHYS/QHP OP CAR RHAB W/ECG: CPT

## 2021-11-23 ENCOUNTER — VIRTUAL VISIT (OUTPATIENT)
Dept: ENDOCRINOLOGY | Facility: CLINIC | Age: 56
End: 2021-11-23
Payer: MEDICARE

## 2021-11-23 DIAGNOSIS — Z94.4 LIVER TRANSPLANT STATUS (H): ICD-10-CM

## 2021-11-23 DIAGNOSIS — Z95.1 S/P CABG (CORONARY ARTERY BYPASS GRAFT): ICD-10-CM

## 2021-11-23 DIAGNOSIS — E11.3293 TYPE 2 DIABETES MELLITUS WITH MILD NONPROLIFERATIVE RETINOPATHY OF BOTH EYES WITHOUT MACULAR EDEMA, UNSPECIFIED WHETHER LONG TERM INSULIN USE (H): Primary | ICD-10-CM

## 2021-11-23 DIAGNOSIS — I95.9 HYPOTENSION, UNSPECIFIED HYPOTENSION TYPE: ICD-10-CM

## 2021-11-23 PROCEDURE — 99214 OFFICE O/P EST MOD 30 MIN: CPT | Mod: 95 | Performed by: PHYSICIAN ASSISTANT

## 2021-11-23 NOTE — PATIENT INSTRUCTIONS
Dear Miller,  Please reduce Tresiba to 26 units daily.  Reduce Novolog to 1u :20 g carb  Continue adding 1 extra unit Novolog if premeal BG >180.

## 2021-11-23 NOTE — PROGRESS NOTES
Diabetes Virtual Consult Note  June 29, 2021        Frandy Workman is a 56 year old male with a past medical history including  has a past medical history of Anemia (2013), Arthritis, BPH (benign prostatic hyperplasia), CAD (coronary artery disease) (4/1/2019), Cholelithiasis, Conductive hearing loss (08/16/2017), Depressive disorder (1986), Gastroesophageal reflux disease (12/01/2014), HCC (hepatocellular carcinoma) (H) (1/22/2019), History of diabetic retinopathy (07/2018), HTN (hypertension), HTN (hypertension) (11/20/2019), Hyperlipidemia, Liver cirrhosis secondary to ESTRADA (H), Liver transplanted (H) (11/11/2019), Portal vein thrombosis (4/11/2019), and Type II diabetes mellitus (H).    He has no past medical history of Asymptomatic human immunodeficiency virus (HIV) infection status (H), Cerebral infarction (H), Congestive heart failure (H), COPD (chronic obstructive pulmonary disease) (H), History of blood transfusion, Infectious mononucleosis, PONV (postoperative nausea and vomiting), Thyroid disease, Tuberculosis, or Uncomplicated asthma. He also struggles with BPAD.      January 2021: advised to increase his Tresiba to 28 units daily with a goal of fasting blood sugar 90-1 40 as advised to increase his Jardiance from 10 mg to 20 mg daily at the time of renewal of this prescription.  He also continued to complain of sweating night sweats and was awaiting a CT scheduled for March 26 as well as seeing psychiatry in regards to this.  We also attempted to adjust his camilo prescription as he was running out at the end of every month.  His average blood glucose at that time was 180.    Last visit, March 2021: Blood glucose was slightly above goal and he was struggling to keep NovoLog and camilo consistently available. We communicated with pharmacy liaison in hopes of adjusting this. His self-management goal was to more consistently give carbohydrate coverage 1 unit per 15 g ahead of meals. He also planned to  continue giving correction of NovoLog 1 unit per 50 greater than 150 prior to meals empagliflozin 10 mg daily, Tresiba 28 units /day and work on getting some physical activity in really each day as well as adding in some resistance training.    2021:  Night sweats resolved.  Advised and agreed to work on giving 1u:15 g Humalog 10 - 15 minutes before each meal.     Interim:  Underwent CABG end of July and in cardiac rehab regularly.  He saw Dr Gonzalez in August and no lesions though needing toenail care and has onychomycosis.    Cardiology stopped Lisinopril in July.   Eye exam MISSED in July  Saw oncology in follow-up Sept no evid recurrence HCC.  Continue Pred 5mg daily and  MMF. Has pulm nodule to follow.   Saw Maira in October - note still on Prednisone.  Lisinopril resumed at 5 mg daily for renal protection.  Empagliflozin also resumed at 10 mg daily.  He is in anemia management clinic with IVELISSE restarted to be discharge as soon as hgb 10.4.      Today:    Reports low BG at night after working out, as low as 50, took a lot of carbs to get it back up.  Had highs a week of 180s after his   Flu shot.  He has gotten away from event log and entering insulin doses.  He s drinking a lot of apple juice at night.  He feels his lows in 70s or 80s sometimes, but sometimes not until 60s and they have dropped into 50 after exercise or overnight after exercise.      His BP is low 95/60, always has been low.  It doesn't really bother him.  He denies feeling  faint or lightheaded at all, even with exercise.        Dm Regimen:   - Tresiba 28 units /day.   -  carbohydrate coverage to 1 unit : 20 g of carbohydrate.   -  Correction Novolounit Novolo mg /dl >180     - Empagliflozin 10 mg daily.      We reviewed glucometer, pump and CGMS data together.  It revealed:          Per CGM S his blood glucose average has improved significantly from 180 at last visit now down to 6.6. This has been achieved without any  hypoglycemia blood sugars in range 86% of the time and high 14%.     History of Diabetes monitoring and complications/ prevention:  CAD: yes 4/2019  Last eye exam results:Treated for NPDMR - last seen 3/3/21  Microalbuminuria: yes - has CKD, not currently on Ace or ARB, sees Cardiology , Nephrology.  Is on Jardiance, understands to hold if possible dehydration.    HTN: past - on coreg  On statin: yes  On ASA:yes  Depression:yes  - sees psych  ED:did not inquire    Frandy's PMH reviewed with him.      Current Outpatient Medications   Medication     Acetaminophen (TYLENOL) 325 MG CAPS     ARIPiprazole (ABILIFY) 5 MG tablet     aspirin (SM ASPIRIN ADULT LOW STRENGTH) 81 MG EC tablet     BD VIKTORIA U/F 32G X 4 MM insulin pen needle     ciclopirox (LOPROX) 0.77 % cream     Continuous Blood Gluc  (FREESTYLE CLAUDIA 14 DAY READER) CECILIO     Continuous Blood Gluc Sensor (FREESTYLE CLAUDIA 14 DAY SENSOR) MISC     cyanocobalamin (VITAMIN B-12) 1000 MCG tablet     empagliflozin (JARDIANCE) 10 MG TABS tablet     insulin aspart (NOVOLOG PEN) 100 UNIT/ML pen     lamiVUDine (EPIVIR) 100 MG tablet     lisinopril (ZESTRIL) 5 MG tablet     methocarbamol (ROBAXIN) 750 MG tablet     metoprolol succinate ER (TOPROL-XL) 25 MG 24 hr tablet     Multiple Vitamin (TAB-A-GLADIS) TABS     mycophenolate (GENERIC EQUIVALENT) 250 MG capsule     order for DME     oxyCODONE (ROXICODONE) 5 MG tablet     pantoprazole (PROTONIX) 40 MG EC tablet     polyethylene glycol (MIRALAX) 17 g packet     predniSONE (DELTASONE) 5 MG tablet     rosuvastatin (CRESTOR) 5 MG tablet     senna-docusate (SENOKOT-S/PERICOLACE) 8.6-50 MG tablet     tamsulosin (FLOMAX) 0.4 MG capsule     vitamin D3 (CHOLECALCIFEROL) 50 mcg (2000 units) tablet     Current Facility-Administered Medications   Medication     aflibercept (EYLEA) injection prefilled syringe 2 mg     aflibercept (EYLEA) injection prefilled syringe 2 mg     Facility-Administered Medications Ordered in Other Visits  "  Medication     darbepoetin elias-polysorbate (ARANESP) injection 60 mcg              Frandy's family history includes Asthma in his sister; Cancer in his father, maternal grandmother, and mother; Cerebrovascular Disease in his mother; Colon Cancer (age of onset: 60) in his father; Colorectal Cancer in his father, maternal grandmother, and paternal grandmother; Depression in his mother and sister; Diabetes in his mother; Glaucoma in his father; Macular Degeneration in his father; Pancreatic Cancer (age of onset: 60) in his father; Prostate Cancer in his father and maternal grandfather; Skin Cancer in his father; Substance Abuse in his maternal grandfather and maternal grandmother; Thyroid Disease in his mother and sister.    ROS:   Patient denies any fevers, chills or sweats as well as any changes in or problems with vision, pain or problems with dentition, new or different headaches.  Patient denies symptoms of hypo and hyperglycemia except as above.   Patients denies marked fatigue, cough, shortness of breath, chest pain or pressure.  There has been no pain with or other changes in urination or  itching or pain in genital areas.  Patient denies any noted swelling in feet, ankles or otherwise, loss of sensation or pain  in feet or other areas.    Patient also denies current difficulties with depressed mood, anhedonia or worrying too much.        Exam:    PHONE VISIT        Wt Readings from Last 10 Encounters:   10/14/20 78 kg (171 lb 14.4 oz)   09/30/20 78.8 kg (173 lb 12.8 oz)   09/25/20 (P) 79 kg (174 lb 1.6 oz)   09/16/20 77.6 kg (171 lb)   08/19/20 76.9 kg (169 lb 8 oz)   08/12/20 76.8 kg (169 lb 4.8 oz)   07/29/20 75.6 kg (166 lb 9.6 oz)   07/28/20 75.8 kg (167 lb)   07/15/20 73.4 kg (161 lb 12.8 oz)   07/01/20 70.3 kg (155 lb)         Frandy is alerted and oriented.  Neatly groomed and articulate.    Mood is \"good,\" affect is congruent.  Thoughtful form and content are fluid and coherent.  No signs of " distress are appreciated.    GENERAL: Healthy, alert and no distress.  Clean shaven.    EYES: Eyes grossly normal to inspection.  No discharge or erythema, or obvious scleral/conjunctival abnormalities.  RESP: No audible wheeze, cough, or visible cyanosis.  No visible retractions or increased work of breathing.    SKIN: Visible skin clear. No significant rash, abnormal pigmentation or lesions.  NEURO: Cranial nerves grossly intact.  Mentation and speech appropriate for age.  PSYCH: Mentation appears normal, affect normal/bright, judgement and insight intact, normal speech and appearance well-groomed.      Data:      Most recent:  Lab Results   Component Value Date    CR 1.95 10/14/2020     Lab Results   Component Value Date     10/14/2020      Anion Gap 3 - 14 mmol/L 4     Glucose 70 - 99 mg/dL 147High      Urea Nitrogen 7 - 30 mg/dL 39High      Creatinine 0.66 - 1.25 mg/dL 1.62High      GFR Estimate >60 mL/min/1.73_m2 46Low       On 1/20/21.      Lab Results   Component Value Date    A1C 6.8 (H) 07/13/2021    A1C 6.0 (H) 12/30/2020    A1C 6.3 (H) 06/13/2020    A1C 6.6 (H) 08/08/2018    A1C 6.5 (H) 06/09/2017    A1C 7.8 (H) 10/25/2016    HEMOGLOBINA1 4.8 03/04/2020    HEMOGLOBINA1 5.1 12/02/2019    HEMOGLOBINA1 5.4 08/14/2019    HEMOGLOBINA1 6.1 (A) 02/11/2019    HEMOGLOBINA1 8.1 (A) 04/11/2018     Lab Results   Component Value Date    MICROL 196 06/29/2020       No results found for: CPEPT, GADAB, ISCAB  Cholesterol   Date Value Ref Range Status   09/07/2021 176 <200 mg/dL Final     Comment:     Age 0-19 years  Desirable: <170 mg/dL  Borderline high:  170-199 mg/dl  High:            >199 mg/dl    Age 20 years and older  Desirable: <200 mg/dL   09/25/2020 146 <200 mg/dL Final   07/29/2020 192 <200 mg/dL Final     HDL Cholesterol   Date Value Ref Range Status   09/25/2020 39 (L) >39 mg/dL Final   07/29/2020 39 (L) >39 mg/dL Final     Direct Measure HDL   Date Value Ref Range Status   09/07/2021 34 (L) >=40  mg/dL Final     Comment:     0-19 years:       Greater than or equal to 45 mg/dL   Low: Less than 40 mg/dL   Borderline low: 40-44 mg/dL     20 years and older:   Female: Greater than or equal to 50 mg/dL   Male:   Greater than or equal to 40 mg/dL          LDL Cholesterol Calculated   Date Value Ref Range Status   09/07/2021 95 <=100 mg/dL Final     Comment:     Age 0-19 years:  Desirable: 0-110 mg/dL   Borderline high: 110-129 mg/dL   High: >= 130 mg/dL    Age 20 years and older:  Desirable: <100mg/dL  Above desirable: 100-129 mg/dL   Borderline high: 130-159 mg/dL   High: 160-189 mg/dL   Very high: >= 190 mg/dL   09/25/2020 61 <100 mg/dL Final     Comment:     Desirable:       <100 mg/dl   07/29/2020 117 (H) <100 mg/dL Final     Comment:     Above desirable:  100-129 mg/dl  Borderline High:  130-159 mg/dL  High:             160-189 mg/dL  Very high:       >189 mg/dl       Triglycerides   Date Value Ref Range Status   09/07/2021 233 (H) <150 mg/dL Final     Comment:     0-9 years:  Normal:    Less than 75 mg/dL  Borderline high:  75-99 mg/dL  High:             Greater than or equal to 100 mg/dL    0-19 years:  Normal:    Less than 90 mg/dL  Borderline high:   mg/dL  High:             Greater than or equal to 130 mg/dL    20 years and older:  Normal:    Less than 150 mg/dL  Borderline high:  150-199 mg/dL  High:             200-499 mg/dL  Very high:   Greater than or equal to 500 mg/dL   09/25/2020 228 (H) <150 mg/dL Final     Comment:     Borderline high:  150-199 mg/dl  High:             200-499 mg/dl  Very high:       >499 mg/dl     07/29/2020 181 (H) <150 mg/dL Final     Comment:     Borderline high:  150-199 mg/dl  High:             200-499 mg/dl  Very high:       >499 mg/dl       No results found for: CHOLHDLRATIO      GFR Estimate   Date Value Ref Range Status   10/04/2021 63 >60 mL/min/1.73m2 Final     Comment:     As of July 11, 2021, eGFR is calculated by the CKD-EPI creatinine equation, without  race adjustment. eGFR can be influenced by muscle mass, exercise, and diet. The reported eGFR is an estimation only and is only applicable if the renal function is stable.   09/24/2021 61 >60 mL/min/1.73m2 Final     Comment:     As of July 11, 2021, eGFR is calculated by the CKD-EPI creatinine equation, without race adjustment. eGFR can be influenced by muscle mass, exercise, and diet. The reported eGFR is an estimation only and is only applicable if the renal function is stable.   08/23/2021 60 (L) >60 mL/min/1.73m2 Final     Comment:     As of July 11, 2021, eGFR is calculated by the CKD-EPI creatinine equation, without race adjustment. eGFR can be influenced by muscle mass, exercise, and diet. The reported eGFR is an estimation only and is only applicable if the renal function is stable.   06/28/2021 46 (L) >60 mL/min/[1.73_m2] Final     Comment:     Non  GFR Calc  Starting 12/18/2018, serum creatinine based estimated GFR (eGFR) will be   calculated using the Chronic Kidney Disease Epidemiology Collaboration   (CKD-EPI) equation.     06/14/2021 49 (L) >60 mL/min/[1.73_m2] Final     Comment:     Non  GFR Calc  Starting 12/18/2018, serum creatinine based estimated GFR (eGFR) will be   calculated using the Chronic Kidney Disease Epidemiology Collaboration   (CKD-EPI) equation.     06/04/2021 46 (L) >60 mL/min/[1.73_m2] Final     Comment:     Non  GFR Calc  Starting 12/18/2018, serum creatinine based estimated GFR (eGFR) will be   calculated using the Chronic Kidney Disease Epidemiology Collaboration   (CKD-EPI) equation.       GFR, ESTIMATED POCT   Date Value Ref Range Status   09/24/2021 55 (L) >60 mL/min/1.73m2 Final       TSH   Date Value Ref Range Status   10/04/2021 1.90 0.40 - 4.00 mU/L Final   01/28/2021 0.91 0.40 - 4.00 mU/L Final          CBC BMP Hep panel from 2/26/21 stable, reassuring.    Awaiting CT 3/26          Assessment/Plan:    Frandy is a 55 year old  male with  has a past medical history of Anemia (2013), Arthritis, BPH (benign prostatic hyperplasia), CAD (coronary artery disease) (4/1/2019), Cholelithiasis, Conductive hearing loss (08/16/2017), Depressive disorder (1986), Gastroesophageal reflux disease (12/01/2014), HCC (hepatocellular carcinoma) (H) (1/22/2019), History of diabetic retinopathy (07/2018), HTN (hypertension), HTN (hypertension) (11/20/2019), Hyperlipidemia, Liver cirrhosis secondary to ESTRADA (H), Liver transplanted (H) (11/11/2019), Portal vein thrombosis (4/11/2019), and Type II diabetes mellitus (H).    He has no past medical history of Asymptomatic human immunodeficiency virus (HIV) infection status (H), Cerebral infarction (H), Congestive heart failure (H), COPD (chronic obstructive pulmonary disease) (H), History of blood transfusion, Infectious mononucleosis, PONV (postoperative nausea and vomiting), Thyroid disease, Tuberculosis, or Uncomplicated asthma.      1. DM2, blood sugar at goal now 4 months s/p CABG and active in cardiac rehab.  Estimated A1C 6.7, but having some limited hypoglycemia, drinking juice every night and with exercise to counteract.  Appears decreased insulin needs with current physical activity.      Recommend:  reduce Tresiba to 26 units daily.  Reduce Novolog to 1u :20 g carb  Continue adding 1 extra unit Novolog if premeal BG >180.    Continue Empagliflozin 10 mg daily.      RTC 6 months      2. DM Complications-     Lifestyle modification focusing on application of a Mediterranean diet or Dietary Approaches to Stop Hypertension (DASH) dietary pattern; reduction of saturated fat and trans fat; increase of dietary n-3 fatty acids, viscous fiber, and plant stanols/sterols intake; and increased physical activity recommended to improve the lipid profile and reduce the risk of developing atherosclerotic cardiovascular disease.     Retinopathy:  Yes.  Seeing retina specialist.    Nephropathy:  No current elevated BP.   Creatinine stable. Continue low dose Lisinopril 5 mg . Continue Jardiance 10 mg as long as tolerated.  .   Neuropathy: No.    Feet: OK, no ulcers or lesions.   Lipids:   Patient taking a statin.      3. Anemia: Is at anemia clinic.IVELISSE restarted to be discharge as soon as hgb 10.4.  Feeling more energy (though also s/p bypass since we last discussed.)     32 minutes in preparation for visit reviewing chart, labs and documentation, visiting with patient gathering history and in exam, education and counseling, as well as coordination of care, further chart review and documentation following visit on this date of service and as alluded to documented above.         It is my privilege to be involved in the care of the above patient.     Tish Toscano PA-C, MPAS  [facility]  Diabetes, Endocrinology, and Metabolism  [phone] Appointments/Nurse  [phone] pager  [phone] nurse line    This note was completed in part using Dragon voice recognition, and may contain word and grammatical errors.      Start: 1:28 pm  End: 1:51 pm

## 2021-11-23 NOTE — PROGRESS NOTES
Frandy OLIVER Ji  is being evaluated via a billable video visit.      How would you like to obtain your AVS? Speek  For the video visit, send the invitation by: Text to cell phone: [phone]  Will anyone else be joining your video visit? No      Patient denies any changes since echeck-in regarding medication and allergies  Pt notes BS at cardiac rehab yesterday 155 and then 97 at end.

## 2021-11-23 NOTE — LETTER
11/23/2021       RE: Frandy Workman  [address] [facility] [address]     Dear Colleague,    Thank you for referring your patient, Frandy Workman, to the [facility] [place] at [facility]. Please see a copy of my visit note below.    Frandy Workman  is being evaluated via a billable video visit.      How would you like to obtain your AVS? Daz 3d  For the video visit, send the invitation by: Text to cell phone: [phone]  Will anyone else be joining your video visit? No      Patient denies any changes since echeck-in regarding medication and allergies  Pt notes BS at cardiac rehab yesterday 155 and then 97 at end.        Diabetes Virtual Consult Note  June 29, 2021        Frandy Workman is a 56 year old male with a past medical history including  has a past medical history of Anemia (2013), Arthritis, BPH (benign prostatic hyperplasia), CAD (coronary artery disease) (4/1/2019), Cholelithiasis, Conductive hearing loss (08/16/2017), Depressive disorder (1986), Gastroesophageal reflux disease (12/01/2014), HCC (hepatocellular carcinoma) (H) (1/22/2019), History of diabetic retinopathy (07/2018), HTN (hypertension), HTN (hypertension) (11/20/2019), Hyperlipidemia, Liver cirrhosis secondary to ESTRADA (H), Liver transplanted (H) (11/11/2019), Portal vein thrombosis (4/11/2019), and Type II diabetes mellitus (H).    He has no past medical history of Asymptomatic human immunodeficiency virus (HIV) infection status (H), Cerebral infarction (H), Congestive heart failure (H), COPD (chronic obstructive pulmonary disease) (H), History of blood transfusion, Infectious mononucleosis, PONV (postoperative nausea and vomiting), Thyroid disease, Tuberculosis, or Uncomplicated asthma. He also struggles with BPAD.      January 2021: advised to increase his Tresiba to 28 units daily with a goal of fasting blood sugar 90-1 40 as advised to  increase his Jardiance from 10 mg to 20 mg daily at the time of renewal of this prescription.  He also continued to complain of sweating night sweats and was awaiting a CT scheduled for March 26 as well as seeing psychiatry in regards to this.  We also attempted to adjust his camilo prescription as he was running out at the end of every month.  His average blood glucose at that time was 180.    Last visit, March 2021: Blood glucose was slightly above goal and he was struggling to keep NovoLog and camilo consistently available. We communicated with pharmacy liaison in hopes of adjusting this. His self-management goal was to more consistently give carbohydrate coverage 1 unit per 15 g ahead of meals. He also planned to continue giving correction of NovoLog 1 unit per 50 greater than 150 prior to meals empagliflozin 10 mg daily, Tresiba 28 units /day and work on getting some physical activity in really each day as well as adding in some resistance training.    July 2021:  Night sweats resolved.  Advised and agreed to work on giving 1u:15 g Humalog 10 - 15 minutes before each meal.     Interim:  Underwent CABG end of July and in cardiac rehab regularly.  He saw Dr Gonzalez in August and no lesions though needing toenail care and has onychomycosis.    Cardiology stopped Lisinopril in July.   Eye exam MISSED in July  Saw oncology in follow-up Sept no evid recurrence HCC.  Continue Pred 5mg daily and  MMF. Has pulm nodule to follow.   Saw Maira in October - note still on Prednisone.  Lisinopril resumed at 5 mg daily for renal protection.  Empagliflozin also resumed at 10 mg daily.  He is in anemia management clinic with IVELSISE restarted to be discharge as soon as hgb 10.4.      Today:    Reports low BG at night after working out, as low as 50, took a lot of carbs to get it back up.  Had highs a week of 180s after his   Flu shot.  He has gotten away from event log and entering insulin doses.  He s drinking a lot of apple  juice at night.  He feels his lows in 70s or 80s sometimes, but sometimes not until 60s and they have dropped into 50 after exercise or overnight after exercise.      His BP is low 95/60, always has been low.  It doesn't really bother him.  He denies feeling  faint or lightheaded at all, even with exercise.        Dm Regimen:   - Tresiba 28 units /day.   -  carbohydrate coverage to 1 unit : 20 g of carbohydrate.   -  Correction Novolounit Novolo mg /dl >180     - Empagliflozin 10 mg daily.      We reviewed glucometer, pump and CGMS data together.  It revealed:          Per CGM S his blood glucose average has improved significantly from 180 at last visit now down to 6.6. This has been achieved without any hypoglycemia blood sugars in range 86% of the time and high 14%.     History of Diabetes monitoring and complications/ prevention:  CAD: yes 2019  Last eye exam results:Treated for NPDMR - last seen 3/3/21  Microalbuminuria: yes - has CKD, not currently on Ace or ARB, sees Cardiology , Nephrology.  Is on Jardiance, understands to hold if possible dehydration.    HTN: past - on coreg  On statin: yes  On ASA:yes  Depression:yes  - sees psych  ED:did not inquire    Frandy's PMH reviewed with him.      Current Outpatient Medications   Medication     Acetaminophen (TYLENOL) 325 MG CAPS     ARIPiprazole (ABILIFY) 5 MG tablet     aspirin (SM ASPIRIN ADULT LOW STRENGTH) 81 MG EC tablet     BD VIKTORIA U/F 32G X 4 MM insulin pen needle     ciclopirox (LOPROX) 0.77 % cream     Continuous Blood Gluc  (FREESTYLE CLAUDIA 14 DAY READER) CECILIO     Continuous Blood Gluc Sensor (FREESTYLE CLAUDIA 14 DAY SENSOR) MISC     cyanocobalamin (VITAMIN B-12) 1000 MCG tablet     empagliflozin (JARDIANCE) 10 MG TABS tablet     insulin aspart (NOVOLOG PEN) 100 UNIT/ML pen     lamiVUDine (EPIVIR) 100 MG tablet     lisinopril (ZESTRIL) 5 MG tablet     methocarbamol (ROBAXIN) 750 MG tablet     metoprolol succinate ER (TOPROL-XL) 25 MG  24 hr tablet     Multiple Vitamin (TAB-A-GLADIS) TABS     mycophenolate (GENERIC EQUIVALENT) 250 MG capsule     order for DME     oxyCODONE (ROXICODONE) 5 MG tablet     pantoprazole (PROTONIX) 40 MG EC tablet     polyethylene glycol (MIRALAX) 17 g packet     predniSONE (DELTASONE) 5 MG tablet     rosuvastatin (CRESTOR) 5 MG tablet     senna-docusate (SENOKOT-S/PERICOLACE) 8.6-50 MG tablet     tamsulosin (FLOMAX) 0.4 MG capsule     vitamin D3 (CHOLECALCIFEROL) 50 mcg (2000 units) tablet     Current Facility-Administered Medications   Medication     aflibercept (EYLEA) injection prefilled syringe 2 mg     aflibercept (EYLEA) injection prefilled syringe 2 mg     Facility-Administered Medications Ordered in Other Visits   Medication     darbepoetin elias-polysorbate (ARANESP) injection 60 mcg              Yehuda family history includes Asthma in his sister; Cancer in his father, maternal grandmother, and mother; Cerebrovascular Disease in his mother; Colon Cancer (age of onset: 60) in his father; Colorectal Cancer in his father, maternal grandmother, and paternal grandmother; Depression in his mother and sister; Diabetes in his mother; Glaucoma in his father; Macular Degeneration in his father; Pancreatic Cancer (age of onset: 60) in his father; Prostate Cancer in his father and maternal grandfather; Skin Cancer in his father; Substance Abuse in his maternal grandfather and maternal grandmother; Thyroid Disease in his mother and sister.    ROS:   Patient denies any fevers, chills or sweats as well as any changes in or problems with vision, pain or problems with dentition, new or different headaches.  Patient denies symptoms of hypo and hyperglycemia except as above.   Patients denies marked fatigue, cough, shortness of breath, chest pain or pressure.  There has been no pain with or other changes in urination or  itching or pain in genital areas.  Patient denies any noted swelling in feet, ankles or otherwise, loss of  "sensation or pain  in feet or other areas.    Patient also denies current difficulties with depressed mood, anhedonia or worrying too much.        Exam:    PHONE VISIT        Wt Readings from Last 10 Encounters:   10/14/20 78 kg (171 lb 14.4 oz)   09/30/20 78.8 kg (173 lb 12.8 oz)   09/25/20 (P) 79 kg (174 lb 1.6 oz)   09/16/20 77.6 kg (171 lb)   08/19/20 76.9 kg (169 lb 8 oz)   08/12/20 76.8 kg (169 lb 4.8 oz)   07/29/20 75.6 kg (166 lb 9.6 oz)   07/28/20 75.8 kg (167 lb)   07/15/20 73.4 kg (161 lb 12.8 oz)   07/01/20 70.3 kg (155 lb)         Frandy is alerted and oriented.  Neatly groomed and articulate.    Mood is \"good,\" affect is congruent.  Thoughtful form and content are fluid and coherent.  No signs of distress are appreciated.    GENERAL: Healthy, alert and no distress.  Clean shaven.    EYES: Eyes grossly normal to inspection.  No discharge or erythema, or obvious scleral/conjunctival abnormalities.  RESP: No audible wheeze, cough, or visible cyanosis.  No visible retractions or increased work of breathing.    SKIN: Visible skin clear. No significant rash, abnormal pigmentation or lesions.  NEURO: Cranial nerves grossly intact.  Mentation and speech appropriate for age.  PSYCH: Mentation appears normal, affect normal/bright, judgement and insight intact, normal speech and appearance well-groomed.      Data:      Most recent:  Lab Results   Component Value Date    CR 1.95 10/14/2020     Lab Results   Component Value Date     10/14/2020      Anion Gap 3 - 14 mmol/L 4     Glucose 70 - 99 mg/dL 147High      Urea Nitrogen 7 - 30 mg/dL 39High      Creatinine 0.66 - 1.25 mg/dL 1.62High      GFR Estimate >60 mL/min/1.73_m2 46Low       On 1/20/21.      Lab Results   Component Value Date    A1C 6.8 (H) 07/13/2021    A1C 6.0 (H) 12/30/2020    A1C 6.3 (H) 06/13/2020    A1C 6.6 (H) 08/08/2018    A1C 6.5 (H) 06/09/2017    A1C 7.8 (H) 10/25/2016    HEMOGLOBINA1 4.8 03/04/2020    HEMOGLOBINA1 5.1 12/02/2019    " HEMOGLOBINA1 5.4 08/14/2019    HEMOGLOBINA1 6.1 (A) 02/11/2019    HEMOGLOBINA1 8.1 (A) 04/11/2018     Lab Results   Component Value Date    MICROL 196 06/29/2020       No results found for: CPEPT, GADAB, ISCAB  Cholesterol   Date Value Ref Range Status   09/07/2021 176 <200 mg/dL Final     Comment:     Age 0-19 years  Desirable: <170 mg/dL  Borderline high:  170-199 mg/dl  High:            >199 mg/dl    Age 20 years and older  Desirable: <200 mg/dL   09/25/2020 146 <200 mg/dL Final   07/29/2020 192 <200 mg/dL Final     HDL Cholesterol   Date Value Ref Range Status   09/25/2020 39 (L) >39 mg/dL Final   07/29/2020 39 (L) >39 mg/dL Final     Direct Measure HDL   Date Value Ref Range Status   09/07/2021 34 (L) >=40 mg/dL Final     Comment:     0-19 years:       Greater than or equal to 45 mg/dL   Low: Less than 40 mg/dL   Borderline low: 40-44 mg/dL     20 years and older:   Female: Greater than or equal to 50 mg/dL   Male:   Greater than or equal to 40 mg/dL          LDL Cholesterol Calculated   Date Value Ref Range Status   09/07/2021 95 <=100 mg/dL Final     Comment:     Age 0-19 years:  Desirable: 0-110 mg/dL   Borderline high: 110-129 mg/dL   High: >= 130 mg/dL    Age 20 years and older:  Desirable: <100mg/dL  Above desirable: 100-129 mg/dL   Borderline high: 130-159 mg/dL   High: 160-189 mg/dL   Very high: >= 190 mg/dL   09/25/2020 61 <100 mg/dL Final     Comment:     Desirable:       <100 mg/dl   07/29/2020 117 (H) <100 mg/dL Final     Comment:     Above desirable:  100-129 mg/dl  Borderline High:  130-159 mg/dL  High:             160-189 mg/dL  Very high:       >189 mg/dl       Triglycerides   Date Value Ref Range Status   09/07/2021 233 (H) <150 mg/dL Final     Comment:     0-9 years:  Normal:    Less than 75 mg/dL  Borderline high:  75-99 mg/dL  High:             Greater than or equal to 100 mg/dL    0-19 years:  Normal:    Less than 90 mg/dL  Borderline high:   mg/dL  High:             Greater than or  equal to 130 mg/dL    20 years and older:  Normal:    Less than 150 mg/dL  Borderline high:  150-199 mg/dL  High:             200-499 mg/dL  Very high:   Greater than or equal to 500 mg/dL   09/25/2020 228 (H) <150 mg/dL Final     Comment:     Borderline high:  150-199 mg/dl  High:             200-499 mg/dl  Very high:       >499 mg/dl     07/29/2020 181 (H) <150 mg/dL Final     Comment:     Borderline high:  150-199 mg/dl  High:             200-499 mg/dl  Very high:       >499 mg/dl       No results found for: CHOLHDLRATIO      GFR Estimate   Date Value Ref Range Status   10/04/2021 63 >60 mL/min/1.73m2 Final     Comment:     As of July 11, 2021, eGFR is calculated by the CKD-EPI creatinine equation, without race adjustment. eGFR can be influenced by muscle mass, exercise, and diet. The reported eGFR is an estimation only and is only applicable if the renal function is stable.   09/24/2021 61 >60 mL/min/1.73m2 Final     Comment:     As of July 11, 2021, eGFR is calculated by the CKD-EPI creatinine equation, without race adjustment. eGFR can be influenced by muscle mass, exercise, and diet. The reported eGFR is an estimation only and is only applicable if the renal function is stable.   08/23/2021 60 (L) >60 mL/min/1.73m2 Final     Comment:     As of July 11, 2021, eGFR is calculated by the CKD-EPI creatinine equation, without race adjustment. eGFR can be influenced by muscle mass, exercise, and diet. The reported eGFR is an estimation only and is only applicable if the renal function is stable.   06/28/2021 46 (L) >60 mL/min/[1.73_m2] Final     Comment:     Non  GFR Calc  Starting 12/18/2018, serum creatinine based estimated GFR (eGFR) will be   calculated using the Chronic Kidney Disease Epidemiology Collaboration   (CKD-EPI) equation.     06/14/2021 49 (L) >60 mL/min/[1.73_m2] Final     Comment:     Non  GFR Calc  Starting 12/18/2018, serum creatinine based estimated GFR (eGFR)  will be   calculated using the Chronic Kidney Disease Epidemiology Collaboration   (CKD-EPI) equation.     06/04/2021 46 (L) >60 mL/min/[1.73_m2] Final     Comment:     Non  GFR Calc  Starting 12/18/2018, serum creatinine based estimated GFR (eGFR) will be   calculated using the Chronic Kidney Disease Epidemiology Collaboration   (CKD-EPI) equation.       GFR, ESTIMATED POCT   Date Value Ref Range Status   09/24/2021 55 (L) >60 mL/min/1.73m2 Final       TSH   Date Value Ref Range Status   10/04/2021 1.90 0.40 - 4.00 mU/L Final   01/28/2021 0.91 0.40 - 4.00 mU/L Final     CBC BMP Hep panel from 2/26/21 stable, reassuring.    Awaiting CT 3/26      Assessment/Plan:    Frandy is a 55 year old male with  has a past medical history of Anemia (2013), Arthritis, BPH (benign prostatic hyperplasia), CAD (coronary artery disease) (4/1/2019), Cholelithiasis, Conductive hearing loss (08/16/2017), Depressive disorder (1986), Gastroesophageal reflux disease (12/01/2014), HCC (hepatocellular carcinoma) (H) (1/22/2019), History of diabetic retinopathy (07/2018), HTN (hypertension), HTN (hypertension) (11/20/2019), Hyperlipidemia, Liver cirrhosis secondary to ESTRADA (H), Liver transplanted (H) (11/11/2019), Portal vein thrombosis (4/11/2019), and Type II diabetes mellitus (H).    He has no past medical history of Asymptomatic human immunodeficiency virus (HIV) infection status (H), Cerebral infarction (H), Congestive heart failure (H), COPD (chronic obstructive pulmonary disease) (H), History of blood transfusion, Infectious mononucleosis, PONV (postoperative nausea and vomiting), Thyroid disease, Tuberculosis, or Uncomplicated asthma.      1. DM2, blood sugar at goal now 4 months s/p CABG and active in cardiac rehab.  Estimated A1C 6.7, but having some limited hypoglycemia, drinking juice every night and with exercise to counteract.  Appears decreased insulin needs with current physical activity.       Recommend:  reduce Tresiba to 26 units daily.  Reduce Novolog to 1u :20 g carb  Continue adding 1 extra unit Novolog if premeal BG >180.    Continue Empagliflozin 10 mg daily.      RTC 6 months      2. DM Complications-     Lifestyle modification focusing on application of a Mediterranean diet or Dietary Approaches to Stop Hypertension (DASH) dietary pattern; reduction of saturated fat and trans fat; increase of dietary n-3 fatty acids, viscous fiber, and plant stanols/sterols intake; and increased physical activity recommended to improve the lipid profile and reduce the risk of developing atherosclerotic cardiovascular disease.     Retinopathy:  Yes.  Seeing retina specialist.    Nephropathy:  No current elevated BP.  Creatinine stable. Continue low dose Lisinopril 5 mg . Continue Jardiance 10 mg as long as tolerated.  .   Neuropathy: No.    Feet: OK, no ulcers or lesions.   Lipids:   Patient taking a statin.      3. Anemia: Is at anemia clinic.IVELISSE restarted to be discharge as soon as hgb 10.4.  Feeling more energy (though also s/p bypass since we last discussed.)     32 minutes in preparation for visit reviewing chart, labs and documentation, visiting with patient gathering history and in exam, education and counseling, as well as coordination of care, further chart review and documentation following visit on this date of service and as alluded to documented above.         It is my privilege to be involved in the care of the above patient.       This note was completed in part using Dragon voice recognition, and may contain word and grammatical errors.      Start: 1:28 pm  End: 1:51 pm             Again, thank you for allowing me to participate in the care of your patient.      Sincerely,    Tish Toscano PA-C

## 2021-11-24 ENCOUNTER — HOSPITAL ENCOUNTER (OUTPATIENT)
Dept: CARDIAC REHAB | Facility: CLINIC | Age: 56
End: 2021-11-24
Attending: PHYSICIAN ASSISTANT
Payer: MEDICARE

## 2021-11-24 PROCEDURE — 93798 PHYS/QHP OP CAR RHAB W/ECG: CPT

## 2021-11-26 ENCOUNTER — HOSPITAL ENCOUNTER (OUTPATIENT)
Dept: CT IMAGING | Facility: CLINIC | Age: 56
Discharge: HOME | End: 2021-11-26
Attending: INTERNAL MEDICINE | Admitting: INTERNAL MEDICINE
Payer: MEDICARE

## 2021-11-26 DIAGNOSIS — C22.0 HCC (HEPATOCELLULAR CARCINOMA) (H): ICD-10-CM

## 2021-11-26 LAB
CREAT BLD-MCNC: 1.6 MG/DL (ref 0.7–1.3)
GFR SERPL CREATININE-BSD FRML MDRD: 47 ML/MIN/1.73M2

## 2021-11-26 PROCEDURE — 71260 CT THORAX DX C+: CPT | Mod: MG

## 2021-11-26 PROCEDURE — 250N000011 HC RX IP 250 OP 636: Performed by: INTERNAL MEDICINE

## 2021-11-26 PROCEDURE — 250N000009 HC RX 250: Performed by: INTERNAL MEDICINE

## 2021-11-26 PROCEDURE — G1004 CDSM NDSC: HCPCS | Performed by: STUDENT IN AN ORGANIZED HEALTH CARE EDUCATION/TRAINING PROGRAM

## 2021-11-26 PROCEDURE — 82565 ASSAY OF CREATININE: CPT | Mod: 91

## 2021-11-26 PROCEDURE — 74177 CT ABD & PELVIS W/CONTRAST: CPT | Mod: 26 | Performed by: STUDENT IN AN ORGANIZED HEALTH CARE EDUCATION/TRAINING PROGRAM

## 2021-11-26 PROCEDURE — 82105 ALPHA-FETOPROTEIN SERUM: CPT | Performed by: INTERNAL MEDICINE

## 2021-11-26 PROCEDURE — 82040 ASSAY OF SERUM ALBUMIN: CPT | Performed by: INTERNAL MEDICINE

## 2021-11-26 PROCEDURE — 71260 CT THORAX DX C+: CPT | Mod: 26 | Performed by: STUDENT IN AN ORGANIZED HEALTH CARE EDUCATION/TRAINING PROGRAM

## 2021-11-26 RX ORDER — IOPAMIDOL 755 MG/ML
100 INJECTION, SOLUTION INTRAVASCULAR ONCE
Status: COMPLETED | OUTPATIENT
Start: 2021-11-26 | End: 2021-11-26

## 2021-11-26 RX ADMIN — IOPAMIDOL 100 ML: 755 INJECTION, SOLUTION INTRAVENOUS at 08:40

## 2021-11-26 RX ADMIN — SODIUM CHLORIDE 75 ML: 9 INJECTION, SOLUTION INTRAVENOUS at 08:41

## 2021-11-29 ENCOUNTER — HOSPITAL ENCOUNTER (OUTPATIENT)
Dept: CARDIAC REHAB | Facility: CLINIC | Age: 56
End: 2021-11-29
Attending: PHYSICIAN ASSISTANT
Payer: MEDICARE

## 2021-11-29 PROCEDURE — 93798 PHYS/QHP OP CAR RHAB W/ECG: CPT

## 2021-11-30 ENCOUNTER — ALLIED HEALTH/NURSE VISIT (OUTPATIENT)
Dept: TRANSPLANT | Facility: CLINIC | Age: 56
End: 2021-11-30
Attending: INTERNAL MEDICINE
Payer: MEDICARE

## 2021-11-30 ENCOUNTER — TELEPHONE (OUTPATIENT)
Dept: PHARMACY | Facility: CLINIC | Age: 56
End: 2021-11-30

## 2021-11-30 ENCOUNTER — LAB (OUTPATIENT)
Dept: LAB | Facility: CLINIC | Age: 56
End: 2021-11-30
Attending: INTERNAL MEDICINE
Payer: MEDICARE

## 2021-11-30 VITALS — OXYGEN SATURATION: 100 % | HEART RATE: 96 BPM | SYSTOLIC BLOOD PRESSURE: 117 MMHG | DIASTOLIC BLOOD PRESSURE: 86 MMHG

## 2021-11-30 DIAGNOSIS — N18.32 ANEMIA OF CHRONIC RENAL FAILURE, STAGE 3B (H): ICD-10-CM

## 2021-11-30 DIAGNOSIS — N18.32 STAGE 3B CHRONIC KIDNEY DISEASE (H): Primary | ICD-10-CM

## 2021-11-30 DIAGNOSIS — D63.1 ANEMIA OF CHRONIC RENAL FAILURE, STAGE 3B (H): ICD-10-CM

## 2021-11-30 DIAGNOSIS — N18.32 STAGE 3B CHRONIC KIDNEY DISEASE (H): ICD-10-CM

## 2021-11-30 LAB
HCT VFR BLD AUTO: 29.4 % (ref 40–53)
HGB BLD-MCNC: 9.3 G/DL (ref 13.3–17.7)

## 2021-11-30 PROCEDURE — 96372 THER/PROPH/DIAG INJ SC/IM: CPT | Performed by: INTERNAL MEDICINE

## 2021-11-30 PROCEDURE — 85014 HEMATOCRIT: CPT | Performed by: PATHOLOGY

## 2021-11-30 PROCEDURE — 36415 COLL VENOUS BLD VENIPUNCTURE: CPT | Performed by: PATHOLOGY

## 2021-11-30 PROCEDURE — 250N000011 HC RX IP 250 OP 636: Mod: EC | Performed by: INTERNAL MEDICINE

## 2021-11-30 PROCEDURE — 85018 HEMOGLOBIN: CPT | Performed by: PATHOLOGY

## 2021-11-30 RX ADMIN — DARBEPOETIN ALFA 60 MCG: 60 INJECTION, SOLUTION INTRAVENOUS; SUBCUTANEOUS at 10:29

## 2021-11-30 NOTE — PROGRESS NOTES
Chief Complaint   Patient presents with     Allied Health Visit     aranesp injection     Frequency: 14 days  Most recent or today's HGB: 9.3  Date: 2021  Date of lat dose: 2021  HGB associated with last dose given: 9.2    Blood Pressure:117/86    Diagnosis: CKD 3/anemia   Ordered by: Eloy Rao MD  VIS Offered: yes    Double Checked by: Nereida Steiner CMA    See MAR for administration details    Pt's first name, last name and  verified prior to medication administration, injection given without complications or questions.   Chasity Acosta CMA

## 2021-11-30 NOTE — TELEPHONE ENCOUNTER
Anemia Management Note  SUBJECTIVE/OBJECTIVE:  Referred by Dr. Eloy Rao on 2021  Primary Diagnosis: Anemia in Chronic Kidney Disease (N18.3, D63.1)   3b  Secondary Diagnosis:  Chronic Kidney Disease, Stage 3 (N18.3)  3b  Liver Tx: 2019  Hgb goal range:  9-10  Epo/Darbo: Aranesp 60mcg every 14 days for Hgb <10.  In Clinic.  *dose increased to 60mcg starting with  21 dose  Iron regimen:  NA.  Iron levels stable  Labs : 2022  Recent IVELISSE use, transfusion, IV iron: Aranesp   RX/TX plans :  6/10/2022     No history of stroke, MI and blood clots.  History of hepatocellular carcinoma - s/p TACE 2019 and liver transplant 2019.     Contact:            Ok to leave message regarding scheduling, medical, and billing per consent to communicate dated 10/2/19                              OK to speak with Davi HurstSaunders (friend/POA) regarding scheduling, medical, and billing per consent to communicate dated 10/2/19    Anemia Latest Ref Rng & Units 10/4/2021 10/5/2021 10/19/2021 2021 2021 2021 2021   IVELISSE Dose - - - - - 60 mcg - 60 mcg   Hemoglobin 13.3 - 17.7 g/dL 10.4(L) 10.1(L) 11.6(L) 11.3(L) 9.2(L) 9.1(L) 9.3(L)   TSAT 15 - 46 % - - - 45 44 - -   Ferritin 26 - 388 ng/mL - - - 479(H) 400(H) - -   PRBCs - - - - - - - -     BP Readings from Last 3 Encounters:   21 117/86   21 103/63   10/04/21 137/80     Wt Readings from Last 2 Encounters:   10/04/21 171 lb (77.6 kg)   21 155 lb 9.6 oz (70.6 kg)           ASSESSMENT:  Hgb:at goal - received dose in clinic - recommend continue current regimen  TSat: at goal >30% Ferritin: At goal (>100ng/mL)    PLAN:  Dose with aranesp and RTC for hgb then aranesp if needed in 2 week(s)    Orders needed to be renewed (for next follow-up date) in EPIC: None    Iron labs due:  12 weeks    Plan discussed with:  No call  Plan provided by:  adri    NEXT FOLLOW-UP DATE:  21    Jie Blum RN   Anemia Services  M  [address] [place] [address]   [email or web address]   Office : [phone]  Fax: [phone]

## 2021-12-02 ENCOUNTER — HOSPITAL ENCOUNTER (OUTPATIENT)
Dept: CARDIAC REHAB | Facility: CLINIC | Age: 56
End: 2021-12-02
Attending: PHYSICIAN ASSISTANT
Payer: MEDICARE

## 2021-12-02 DIAGNOSIS — Z95.1 S/P CABG (CORONARY ARTERY BYPASS GRAFT): ICD-10-CM

## 2021-12-02 PROCEDURE — 93798 PHYS/QHP OP CAR RHAB W/ECG: CPT

## 2021-12-02 NOTE — TELEPHONE ENCOUNTER
Patient is request refill for Tresiba FlexTouch be sent to [facility]. Inject 27 units under the skin once daily.    **Not active on current med list

## 2021-12-06 DIAGNOSIS — E11.3293 TYPE 2 DIABETES MELLITUS WITH MILD NONPROLIFERATIVE RETINOPATHY OF BOTH EYES WITHOUT MACULAR EDEMA, UNSPECIFIED WHETHER LONG TERM INSULIN USE (H): Primary | ICD-10-CM

## 2021-12-06 RX ORDER — INSULIN DEGLUDEC 100 U/ML
INJECTION, SOLUTION SUBCUTANEOUS
Qty: 25 ML | Refills: 3 | Status: SHIPPED | OUTPATIENT
Start: 2021-12-06 | End: 2022-10-31

## 2021-12-07 ENCOUNTER — IMMUNIZATION (OUTPATIENT)
Dept: NURSING | Facility: CLINIC | Age: 56
End: 2021-12-07

## 2021-12-07 ENCOUNTER — OFFICE VISIT (OUTPATIENT)
Dept: INTERNAL MEDICINE | Facility: CLINIC | Age: 56
End: 2021-12-07
Payer: MEDICARE

## 2021-12-07 VITALS
WEIGHT: 176.6 LBS | SYSTOLIC BLOOD PRESSURE: 92 MMHG | HEIGHT: 69 IN | DIASTOLIC BLOOD PRESSURE: 56 MMHG | BODY MASS INDEX: 26.16 KG/M2 | OXYGEN SATURATION: 100 % | RESPIRATION RATE: 16 BRPM | HEART RATE: 91 BPM

## 2021-12-07 DIAGNOSIS — E11.3293 TYPE 2 DIABETES MELLITUS WITH MILD NONPROLIFERATIVE RETINOPATHY OF BOTH EYES WITHOUT MACULAR EDEMA, UNSPECIFIED WHETHER LONG TERM INSULIN USE (H): Primary | ICD-10-CM

## 2021-12-07 DIAGNOSIS — Z94.4 LIVER REPLACED BY TRANSPLANT (H): ICD-10-CM

## 2021-12-07 DIAGNOSIS — N18.31 CHRONIC KIDNEY DISEASE, STAGE 3A (H): ICD-10-CM

## 2021-12-07 DIAGNOSIS — D63.1 ANEMIA OF CHRONIC RENAL FAILURE, STAGE 3A (H): ICD-10-CM

## 2021-12-07 DIAGNOSIS — N18.31 ANEMIA OF CHRONIC RENAL FAILURE, STAGE 3A (H): ICD-10-CM

## 2021-12-07 PROCEDURE — 99213 OFFICE O/P EST LOW 20 MIN: CPT | Mod: GC | Performed by: STUDENT IN AN ORGANIZED HEALTH CARE EDUCATION/TRAINING PROGRAM

## 2021-12-07 PROCEDURE — 0004A PR COVID VAC PFIZER DIL RECON 30 MCG/0.3 ML IM: CPT

## 2021-12-07 PROCEDURE — 91300 PR COVID VAC PFIZER DIL RECON 30 MCG/0.3 ML IM: CPT

## 2021-12-07 ASSESSMENT — PAIN SCALES - GENERAL: PAINLEVEL: MILD PAIN (3)

## 2021-12-07 ASSESSMENT — MIFFLIN-ST. JEOR: SCORE: 1616.43

## 2021-12-07 NOTE — PROGRESS NOTES
[facility]         HPI:      HPI:  Frandy Workman is a 56 year old male with complex medical history who presents to establish care.     Overall, Frandy has been feeling relatively well and he has no specific complaints today. He has a history of HLD, HTN, DMII and NSTEMI in 07/2021. Coronary angiogram at that time showed multivessel disease with involvement of LM and he underwent 2VCABG with Dr. Zapata on 7/14/21. Patient recovered well but still has minimal pain in his chest at the sternotomy site. He just finished cardiac rehab. No exertional dyspnea, orthopnea. No heart palpitations. No swelling in the legs. He follows with cardiology here at the [facility]. Patient had a liver transplant for HCC 2 years ago and is doing well. He is currently on prednisone and MMF for immunosuppression. He follows with hepatology here at the [facility]. Type II DM is managed by endocrinology. CKD stage IIIa is managed by nephrology. He also has a history of anemia of chronic disease and bipolar affective disorder. His medications were reviewed and are listed below. He has no other complaints at this time.     Medications:  Current Outpatient Medications   Medication     Acetaminophen (TYLENOL) 325 MG CAPS     ARIPiprazole (ABILIFY) 5 MG tablet     aspirin (SM ASPIRIN ADULT LOW STRENGTH) 81 MG EC tablet     BD VIKTORIA U/F 32G X 4 MM insulin pen needle     ciclopirox (LOPROX) 0.77 % cream     Continuous Blood Gluc  (FREESTYLE CLAUDIA 14 DAY READER) CECILIO     Continuous Blood Gluc Sensor (FREESTYLE CLAUDIA 2 SENSOR) MISC     cyanocobalamin (VITAMIN B-12) 1000 MCG tablet     empagliflozin (JARDIANCE) 10 MG TABS tablet     insulin aspart (NOVOLOG PEN) 100 UNIT/ML pen     insulin degludec (TRESIBA FLEXTOUCH) 100 UNIT/ML pen     lamiVUDine (EPIVIR) 100 MG tablet     lisinopril (ZESTRIL) 5 MG tablet     methocarbamol (ROBAXIN) 750 MG tablet     metoprolol succinate ER (TOPROL-XL) 25 MG 24 hr tablet     Multiple Vitamin  (TAB-A-GLADIS) TABS     mycophenolate (GENERIC EQUIVALENT) 250 MG capsule     order for DME     oxyCODONE (ROXICODONE) 5 MG tablet     pantoprazole (PROTONIX) 40 MG EC tablet     polyethylene glycol (MIRALAX) 17 g packet     predniSONE (DELTASONE) 5 MG tablet     rosuvastatin (CRESTOR) 5 MG tablet     senna-docusate (SENOKOT-S/PERICOLACE) 8.6-50 MG tablet     tamsulosin (FLOMAX) 0.4 MG capsule     vitamin D3 (CHOLECALCIFEROL) 50 mcg (2000 units) tablet     Current Facility-Administered Medications   Medication     aflibercept (EYLEA) injection prefilled syringe 2 mg     aflibercept (EYLEA) injection prefilled syringe 2 mg     Facility-Administered Medications Ordered in Other Visits   Medication     darbepoetin elias-polysorbate (ARANESP) injection 60 mcg      Allergies:  Allergies   Allergen Reactions     Codeine Other (See Comments)     Cannot take due to liver  Cannot tolerate oral narcotics     Seasonal Allergies      Sneezing, coughing, runny and itchy eyes     Medical History:  Past Medical History:   Diagnosis Date     Anemia 2013     Arthritis      BPH (benign prostatic hyperplasia)      CAD (coronary artery disease) 4/1/2019     Cholelithiasis      Conductive hearing loss 08/16/2017     Depressive disorder 1986    Suffer effects throughout life     Gastroesophageal reflux disease 12/01/2014     HCC (hepatocellular carcinoma) (H) 1/22/2019     History of diabetic retinopathy 07/2018     HTN (hypertension)      HTN (hypertension) 11/20/2019     Hyperlipidemia      Liver cirrhosis secondary to ESTRADA (H)      Liver transplanted (H) 11/11/2019     Portal vein thrombosis 4/11/2019     Type II diabetes mellitus (H)      Family History:  Family History   Problem Relation Age of Onset     Prostate Cancer Maternal Grandfather      Substance Abuse Maternal Grandfather         Alcohol     Colon Cancer Father 60     Pancreatic Cancer Father 60     Prostate Cancer Father      Colorectal Cancer Father      Macular  Degeneration Father      Cancer Father      Glaucoma Father      Skin Cancer Father      Colorectal Cancer Maternal Grandmother      Cancer Maternal Grandmother      Substance Abuse Maternal Grandmother         Alcohol     Colorectal Cancer Paternal Grandmother      Cancer Mother      Diabetes Mother          3/2016     Cerebrovascular Disease Mother         Passed away in Feb of this year, 80 years old.     Thyroid Disease Mother      Depression Mother      Asthma Sister         Had since birth     Thyroid Disease Sister      Depression Sister      Liver Disease No family hx of      Melanoma No family hx of      Social History:  Social History     Socioeconomic History     Marital status:      Spouse name: None     Number of children: None     Years of education: None     Highest education level: Bachelor's degree (e.g., BA, AB, BS)   Occupational History     None   Tobacco Use     Smoking status: Former Smoker     Packs/day: 6.00     Years: 30.00     Pack years: 180.00     Types: Cigars     Start date: 2016     Quit date: 10/25/2017     Years since quittin.1     Smokeless tobacco: Former User     Types: Chew     Quit date: 10/31/2017     Tobacco comment: 1 tin per week   Substance and Sexual Activity     Alcohol use: No     Alcohol/week: 0.0 standard drinks     Comment: quit 1996     Drug use: No     Sexual activity: Not Currently     Partners: Female     Birth control/protection: Condom   Other Topics Concern     Parent/sibling w/ CABG, MI or angioplasty before 65F 55M? Yes   Social History Narrative    [facility]     Social Determinants of Health     Financial Resource Strain: Low Risk      Difficulty of Paying Living Expenses: Not very hard   Food Insecurity: No Food Insecurity     Worried About Running Out of Food in the Last Year: Never true     Ran Out of Food in the Last Year: Never true   Transportation Needs: No Transportation Needs     Lack of Transportation  "(Medical): No     Lack of Transportation (Non-Medical): No   Physical Activity: Not on file   Stress: Not on file   Social Connections: Not on file   Intimate Partner Violence: Not on file   Housing Stability: Not on file          Review of Systems:     ROS  I have personally reviewed and updated the complete ROS on the day of the visit.           Physical Exam:   BP 92/56 (BP Location: Right arm, Patient Position: Sitting, Cuff Size: Adult Regular)   Pulse 91   Resp 16   Ht 1.753 m (5' 9\")   Wt 80.1 kg (176 lb 9.6 oz)   SpO2 100%   BMI 26.08 kg/m    Body mass index is 26.08 kg/m .  Vitals were reviewed    Physical Exam:   General: Sitting upright, talking in complete sentences, non-distressed  HEENT: Normocephalic, atraumatic, PERRL, EOMI, no conjunctival pallor, anicteric, nares patent, oropharynx clear and moist  CVS: Well-healed sternotomy, S1/S2 WNL, RRR, no murmur, no LE edema, cap refill <2 seconds  Pulm: Non-labored breathing, vesicular breath sounds, no crackles or wheeze  Abdo: Well-healed chevron incision, non-distended, non-tender to palpation, no rebound or guarding, BS present   MSK: No obvious bony deformities, no clubbing  Skin: Warm and dry, no obvious rashes  Neuro: Alert and oriented x3, non-focal   Psych: Normal mood and affect     Assessment and Plan     CAD s/p 2VCABG  HTN  HLD  - Following with cardiology here at the [facility]  - Currently on aspirin 81mg daily, rosuvastatin 5mg daily, metoprolol SA 12.5mg daily, lisinopril 5mg daily    Hx of HCC s/p liver transplant  Portal HTN   Chronic thrombosis of the SMA  - Following with hepatology here at the [facility]  - Last CT scan on 9/24/2021 does not show any suspicious soft tissues masses or fluid collections in the abdomen  - Currently on MMF 750mg q12hrs and prednisone 5mg daily for immunosuppression    DMII  - Following with endocrinology here at the [facility]  - Last hemoglobin A1c 6.8 on 7/13/2021  - Has freestyle camilo continuous glucose " monitor   - Tresiba 28 units /day.  - Carbohydrate coverage to 1 unit: 20 g of carbohydrate.  - Correction Novolounit Novolo mg /dl >180    - Empagliflozin 10 mg daily    CKD stage IIIa  - Following with nephrology here at the [facility]  - Prior to liver transplant baseline Cr ~1.1, post-transplant creatinine is  ~1.6 mg/dL (eGFR of 40 ml/min) and remains stable - - On lisinopril and empaglifozin as above     Anemia  - Possibly related to MMF in addition to CKD  - Responsive to IVELISSE treatment in past, held when hemoglobin >10    Bipolar affective disorder  - Previously on lithium  - Currently, symptoms well-controlled and in remission  - NTD     Options for treatment and follow-up care were reviewed with the patient. Frandy Workman engaged in the decision making process and verbalized understanding of the options discussed and agreed with the final plan.    Mohamud Tavarez MD  Dec 7, 2021    Pt was seen and plan of care discussed with Dr. Romero.    Mohamud Tavarez MD  Internal Medicine

## 2021-12-07 NOTE — PROGRESS NOTES
"I, Layton Romero MD saw the patient with the resident, and agree with the resident's findings and plan of care as documented in the resident's note.  BP 92/56 (BP Location: Right arm, Patient Position: Sitting, Cuff Size: Adult Regular)   Pulse 91   Resp 16   Ht 1.753 m (5' 9\")   Wt 80.1 kg (176 lb 9.6 oz)   SpO2 100%   BMI 26.08 kg/m    I personally reviewed vital signs and past record.  Key findings: CKD, hx Liver TXP    "

## 2021-12-07 NOTE — NURSING NOTE
Frandy Workman is a 56 year old male patient that presents today in clinic for the following:    Chief Complaint   Patient presents with     RECHECK     Six month follow-up     Establish Care     From Dr. Moe     The patient's allergies and medications were reviewed as noted. A set of vitals were recorded as noted without incident. The patient does not have any other questions for the provider.    Audi Ibarra, EMT at 8:40 AM on 12/7/2021

## 2021-12-08 ENCOUNTER — VIRTUAL VISIT (OUTPATIENT)
Dept: BEHAVIORAL HEALTH | Facility: CLINIC | Age: 56
End: 2021-12-08
Payer: MEDICARE

## 2021-12-08 DIAGNOSIS — F31.31 BIPOLAR 1 DISORDER, DEPRESSED, MILD (H): Primary | ICD-10-CM

## 2021-12-08 PROCEDURE — 90834 PSYTX W PT 45 MINUTES: CPT | Mod: 95 | Performed by: PSYCHOLOGIST

## 2021-12-08 NOTE — PROGRESS NOTES
[facility]: Integrated Behavioral Health  December 8, 2021        Behavioral Health Clinician Progress Note    Patient Name: Frandy Workman           Service Type: Video visit      Service Location:  Video visit      Session Start Time: 2:02  Session End Time:  2:44      Session Length: 38 - 52      Attendees: Patient    Visit Activities (Refresh list every visit): [facility] Only     Telemedicine Visit: The patient's condition can be safely assessed and treated via synchronous audio and visual telemedicine encounter.      Reason for Telemedicine Visit: COVID-19    Originating Site (Patient Location): Patient's home    Distant Site (Provider Location): Provider Remote Setting    Consent:  The patient/guardian has verbally consented to: the potential risks and benefits of telemedicine (video visit) versus in person care; bill my insurance or make self-payment for services provided; and responsibility for payment of non-covered services.     Mode of Communication:  Video Conference via m2fx    As the provider I attest to compliance with applicable laws and regulations related to telemedicine.    Diagnostic Assessment Date:  12/03/2020  Treatment Plan Review Date:  2/16/2022  See Flowsheets for today's PHQ-9 and LIZZETTE-7 results  Previous PHQ-9:   PHQ-9 SCORE 12/29/2020 4/7/2021 9/30/2021   PHQ-9 Total Score MyChart 5 (Mild depression) 7 (Mild depression) -   PHQ-9 Total Score 5 7 10     Previous LIZZETTE-7:   LIZZETTE-7 SCORE 12/29/2020 4/7/2021 9/30/2021   Total Score 8 (mild anxiety) 9 (mild anxiety) -   Total Score 8 9 10       Extended Session (60+ minutes): No  Interactive Complexity: No  Crisis: No    Treatment Objective(s) Addressed in This Session:  Target Behavior(s): disease management/lifestyle changes  related to adaptive approaches to managing anxious distress and mood difficulties      Current Stressors / Issues:  [facility] met with Miller vieyra over telemedicine to continue supporting his  treatment of mood difficulties and adjusting to chronic disease difficulties. Miller presents today with a more upbeat, positive affect. He describes doing better physically, noting he finished his cardiac rehabilitation and is noting more energy during the day. He notes exercising more when able. Miller is also reporting looking forward to a trip to [place] this Friday. He plans to spend time with family and commemorate his father, who  recently. He reflected on how this time of year, the month of December as a whole, is rather difficult for him emotionally, as it includes several anniversaries of significant events in his life, such as the death of his wife. We talked about the difficulty of getting through anniversary dates and ways he could adaptively respond to them through CBT skills. Discussed reframing the holidays in new ways too, as Miller notes that being alone for them can be difficult. [facility] continued to also provide Miller supportive psychotherapy.     Progress on Treatment Objective(s) / Homework:  Stable - MAINTENANCE (Working to maintain change, with risk of relapse); Intervened by continuing to positively reinforce healthy behavior choice       Solution-Focused Therapy    Explore patterns in patient's relationships and discuss options for new behaviors.    Explore patterns in patient's actions and choices and discuss options for new behaviors.    Behavioral Activation    Discuss steps patient can take to become more involved in meaningful activity.    Identify barriers to these activities and explore possible solutions.    Narrative Therapy    Explore the patient's story of his/her life from his/her perspective.    Explore alternate ways of understanding their experience, identifying exceptions, developing new themes.      Answers for HPI/ROS submitted by the patient on 2021   LIZZETTE 7 TOTAL SCORE: 9  If you checked off any problems, how difficult have these problems made it for you to do your work, take care  of things at home, or get along with other people?: Very difficult  PHQ9 TOTAL SCORE: 7*    *No SI    Answers for HPI/ROS submitted by the patient on 10/13/2020   If you checked off any problems, how difficult have these problems made it for you to do your work, take care of things at home, or get along with other people?: Somewhat difficult  PHQ9 TOTAL SCORE: 8*  LIZZETTE 7 TOTAL SCORE: 6      *No SI     Answers for HPI/ROS submitted by the patient on 12/29/2020   If you checked off any problems, how difficult have these problems made it for you to do your work, take care of things at home, or get along with other people?: Somewhat difficult  PHQ9 TOTAL SCORE: 5*  LIZZETTE 7 TOTAL SCORE: 8    *No SI       Care Plan review completed: yes    Medication Review:  No changes to current psychiatric medication(s)     Current Outpatient Medications   Medication     Acetaminophen (TYLENOL) 325 MG CAPS     ARIPiprazole (ABILIFY) 5 MG tablet     aspirin (SM ASPIRIN ADULT LOW STRENGTH) 81 MG EC tablet     BD VIKTORIA U/F 32G X 4 MM insulin pen needle     ciclopirox (LOPROX) 0.77 % cream     Continuous Blood Gluc  (RazmirYLE CLAUDIA 14 DAY READER) CECILIO     Continuous Blood Gluc Sensor (FREESTYLE CLAUDIA 2 SENSOR) MISC     cyanocobalamin (VITAMIN B-12) 1000 MCG tablet     empagliflozin (JARDIANCE) 10 MG TABS tablet     insulin aspart (NOVOLOG PEN) 100 UNIT/ML pen     insulin degludec (TRESIBA FLEXTOUCH) 100 UNIT/ML pen     lamiVUDine (EPIVIR) 100 MG tablet     lisinopril (ZESTRIL) 5 MG tablet     methocarbamol (ROBAXIN) 750 MG tablet     metoprolol succinate ER (TOPROL-XL) 25 MG 24 hr tablet     Multiple Vitamin (TAB-A-GLADIS) TABS     mycophenolate (GENERIC EQUIVALENT) 250 MG capsule     order for DME     oxyCODONE (ROXICODONE) 5 MG tablet     pantoprazole (PROTONIX) 40 MG EC tablet     polyethylene glycol (MIRALAX) 17 g packet     predniSONE (DELTASONE) 5 MG tablet     rosuvastatin (CRESTOR) 5 MG tablet     senna-docusate  (SENOKOT-S/PERICOLACE) 8.6-50 MG tablet     tamsulosin (FLOMAX) 0.4 MG capsule     vitamin D3 (CHOLECALCIFEROL) 50 mcg (2000 units) tablet     Current Facility-Administered Medications   Medication     aflibercept (EYLEA) injection prefilled syringe 2 mg     aflibercept (EYLEA) injection prefilled syringe 2 mg     Facility-Administered Medications Ordered in Other Visits   Medication     darbepoetin elias-polysorbate (ARANESP) injection 60 mcg         Medication Compliance:  Yes    Changes in Health Issues:   None reported    Chemical Use Review:   Substance Use: Chemical use reviewed, no active concerns identified      Tobacco Use: No current tobacco use.         Assessment: Current Emotional / Mental Status (status of significant symptoms):  Risk status (Self / Other harm or suicidal ideation)  Patient denies a history of suicidal ideation, suicide attempts, self-injurious behavior, homicidal ideation, homicidal behavior and and other safety concerns     Patient denies current fears or concerns for personal safety.  Patient denies current or recent suicidal ideation or behaviors.  Patient denies current or recent homicidal ideation or behaviors.  Patient denies current or recent self injurious behavior or ideation.  Patient denies other safety concerns.     A safety and risk management plan has not been developed at this time, however patient was encouraged to call [place] [phone] should there be a change in any of these risk factors.    [place] Suicide Severity Rating Scale (Short Version)  [place] Suicide Severity Rating (Short Version) 1/24/2019 11/10/2019 12/2/2019 12/10/2019 6/11/2020 7/1/2020 7/12/2021   Over the past 2 weeks have you felt down, depressed, or hopeless? - no yes yes no no no   Over the past 2 weeks have you had thoughts of killing yourself? - no yes no no no no   Comments - - lots of stressors, has thought about not taking meds - - - -   Have you ever attempted to kill yourself? - no no  no no no no   Q1 Wished to be Dead (Past Month) no - other (see comments) no - - -   Comments - - why did i do this surgery - - - -   Q2 Suicidal Thoughts (Past Month) no - no no - - -   Comments - - wishes he wouldn't have gone through surgery - - - -   Q3 Suicidal Thought Method - - no no - - -   Q4 Suicidal Intent without Specific Plan - - no no - - -   Q5 Suicide Intent with Specific Plan - - no no - - -   Q6 Suicide Behavior (Lifetime) no - no no - - -         Appearance:   Appropriate   Eye Contact:   Good   Psychomotor Behavior: Restless   Attitude:   Cooperative  Interested Friendly Pleasant  Orientation:   All  Speech   Rate / Production: Normal/ Responsive   Volume:  Normal   Mood:    Normal  Affect:    Appropriate    Thought Content:  Clear   Thought Form:  Goal Directed  Logical   Insight:    Good     Diagnoses:  1. Bipolar 1 disorder, depressed, mild (H)          Collateral Reports Completed:  Not Applicable    Plan: (Homework, other):  Continue with this writer for individual psychotherapy in three weeks. He will continue to work on managing anxiety through adaptive behavior change, like tolerable/safe exercise/movement and engaging with social support contacts.     Joseph Murillo, LP  December 8, 2021            ______________________________________________________________________    [facility] Integrated [facility]al Health Treatment Plan    Client's Name: Frandy Workman  YOB: 1965    Date: 11/16/2021      DSM-V Diagnoses: 296.51 Bipolar I Disorder Current or Most Recent Episode Depressed, Mild;     Clinical Global Impressions  First:  Considering your total clinical experience with this particular patient population, how severe are the patient's symptoms at this time?: 3 (11/29/2021  8:49 AM)      Most recent:  Compared to the patient's condition at the START of treatment, this patient's condition is: 2 (11/29/2021  8:49 AM)        Referral / Collaboration:  Will collaborate with care team  as indicated during treatment.    Anticipated number of session or this episode of care: 10+      MeasurableTreatment Goal(s) related to diagnosis / functional impairment(s)  Goal 1:  Patient will experience a reduction in depressive and anxious symptoms, along with a corresponding increase in positive emotion and life satisfaction.    Objective #A: Patient will experience a reduction in depressed mood, will develop more effective coping skills to manage depressive symptoms, will develop healthy cognitive patterns and beliefs, will increase ability to function adaptively and will continue to take medications as prescribed / participate in supportive activities and services    Status: Continued - Date(s): 11/16/2021    Objective #B: Patient will experience a reduction in anxiety, will develop more effective coping skills to manage anxiety symptoms, will develop healthy cognitive patterns and beliefs and will increase ability to function adaptively  Status: Continued - Date(s): 11/16/2021    Objective #C: Patient will develop better understanding of triggers and coping strategies to stabilize mood  Status: Continued - Date(s): 11/16/2021    Goal 2:  Patient will identify and increase engagement in valued activity, i.e. improving social connections/relationships, pursuing occupational goals or personally meaningful pursuits, exploration of meaning in life.     Objective #A: Patient will identify meaningful activity in social, occupational and  personal goals, and increase behavioral activation around these goals   Status: Continued - Date(s): 11/16/2021    Objective #B: Patient will address relationship difficulties in a more adaptive manner  Status: Continued - Date(s): 11/16/2021    Objective #C: Patient will develop coping/problem-solving skills to facilitate more adaptive adjustment and will effectively address problems that interfere with adaptive functioning  Status: Continued - Date(s):  11/16/2021        Possible Therapeutic Intervention(s)  Psycho-education regarding mental health diagnoses and treatment options    Skills training    Explore skills useful to client in current situation.    Skills include assertiveness, communication, conflict management, problem-solving, relaxation, etc.    Solution-Focused Therapy    Explore patterns in patient's relationships and discuss options for new behaviors.    Explore patterns in patient's actions and choices and discuss options for new behaviors.    Cognitive-behavioral Therapy    Discuss common cognitive distortions, identify them in patient's life.    Explore ways to challenge, replace, and act against these cognitions.    Acceptance and Commitment Therapy    Explore and identify important values in patient's life.    Discuss ways to commit to behavioral activation around these values.    Psychodynamic psychotherapy    Discuss patient's emotional dynamics and issues and how they impact behaviors.    Explore patient's history of relationships and how they impact present behaviors.    Explore how to work with and make changes in these schemas and patterns.    Narrative Therapy    Explore the patient's story of his/her life from his/her perspective.    Explore alternate ways of understanding their experience, identifying exceptions, developing new themes.    Interpersonal Psychotherapy    Explore patterns in relationships that are effective or ineffective at helping patient reach their goals, find satisfying experience.    Discuss new patterns or behaviors to engage in for improved social functioning.    Behavioral Activation    Discuss steps patient can take to become more involved in meaningful activity.    Identify barriers to these activities and explore possible solutions.    Mindfulness-Based Strategies    Discuss skills based on development and application of mindfulness.    Skills drawn from compassion-focused therapy, dialectical behavior  therapy, mindfulness-based stress reduction, mindfulness-based cognitive therapy, etc.      We have developed these goals together during our work to this point. Patient has assisted in the development of these goals and has agreed to this treatment plan.       Joseph Murillo LP  11/16/2021

## 2021-12-09 ENCOUNTER — OFFICE VISIT (OUTPATIENT)
Dept: ENDOCRINOLOGY | Facility: CLINIC | Age: 56
End: 2021-12-09
Payer: MEDICARE

## 2021-12-09 DIAGNOSIS — E11.69 TYPE 2 DIABETES MELLITUS WITH DIABETIC FOOT DEFORMITY (H): ICD-10-CM

## 2021-12-09 DIAGNOSIS — M21.969 TYPE 2 DIABETES MELLITUS WITH DIABETIC FOOT DEFORMITY (H): ICD-10-CM

## 2021-12-09 DIAGNOSIS — L60.2 ONYCHAUXIS: ICD-10-CM

## 2021-12-09 DIAGNOSIS — E11.49 TYPE II OR UNSPECIFIED TYPE DIABETES MELLITUS WITH NEUROLOGICAL MANIFESTATIONS, NOT STATED AS UNCONTROLLED(250.60) (H): Primary | ICD-10-CM

## 2021-12-09 PROCEDURE — 99214 OFFICE O/P EST MOD 30 MIN: CPT | Performed by: PODIATRIST

## 2021-12-09 NOTE — PROGRESS NOTES
Past Medical History:   Diagnosis Date     Anemia 2013     Arthritis      BPH (benign prostatic hyperplasia)      CAD (coronary artery disease) 4/1/2019     Cholelithiasis      Conductive hearing loss 08/16/2017     Depressive disorder 1986    Suffer effects throughout life     Gastroesophageal reflux disease 12/01/2014     HCC (hepatocellular carcinoma) (H) 1/22/2019     History of diabetic retinopathy 07/2018     HTN (hypertension)      HTN (hypertension) 11/20/2019     Hyperlipidemia      Liver cirrhosis secondary to ESTRADA (H)      Liver transplanted (H) 11/11/2019     Portal vein thrombosis 4/11/2019     Type II diabetes mellitus (H)      Patient Active Problem List   Diagnosis     Bipolar affective disorder in remission (H)     Esophageal varices determined by endoscopy (H)     Brow ptosis     Paralytic lagophthalmos of right upper eyelid     Erectile dysfunction due to diseases classified elsewhere     HCC (hepatocellular carcinoma) (H)     Equivocal stress echocardiogram     Type 2 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, unspecified whether long term insulin use (H)     Status post coronary angiogram     CAD (coronary artery disease)     Liver transplant recipient (H)     Status post liver transplantation (H)     Immunosuppressed status (H)     Benign essential hypertension     Malnutrition related to chronic disease (H)     Hypophosphatasia     Chronic kidney disease, stage 3a (H)     Malnutrition (H)     Anxiety     Mild recurrent major depression (H)     Anemia of chronic renal failure, stage 3a (H)     Rekha (H)     History of coronary artery disease     Dyslipidemia     Constipation, unspecified constipation type     Excessive sweating     Stage 3b chronic kidney disease (H)     Anemia of chronic renal failure, stage 3b (H)     NSTEMI (non-ST elevated myocardial infarction) (H)     Past Surgical History:   Procedure Laterality Date     BYPASS GRAFT  ARTERY CORONARY N/A 7/14/2021    Procedure: median sternotomy, on cardiopulmonary bypass, CORONARY ARTERY BYPASS GRAFT (CABG) x2 with left greater saphenous vein endoscopic harvest and left internal mammery artery harvest;  Surgeon: Tom Zapata MD;  [facility]     COLONOSCOPY      2015     COLONOSCOPY N/A 12/6/2019    Procedure: COLONOSCOPY, WITH POLYPECTOMY AND BIOPSY;  Surgeon: Adam Morton MD;  Location: [facility]     CV CENTRAL VENOUS CATHETER PLACEMENT N/A 7/12/2021    Procedure: Central Venous Catheter Placement;  Surgeon: Fermin Polanco MD;  [facility]     CV CORONARY ANGIOGRAM N/A 7/12/2021    Procedure: Coronary Angiogram;  Surgeon: Fermin Polanco MD;  [facility]     CV HEART CATHETERIZATION WITH POSSIBLE INTERVENTION N/A 2/26/2019    Procedure: CORS;  Surgeon: Jagdish Hoyt MD;  [facility]     CV INTRA AORTIC BALLOON N/A 7/12/2021    Procedure: Intra Aortic Balloon Pump Insertion;  Surgeon: Fermin Polanco MD;  [facility]     ESOPHAGOSCOPY, GASTROSCOPY, DUODENOSCOPY (EGD), COMBINED N/A 11/17/2016    Procedure: COMBINED ESOPHAGOSCOPY, GASTROSCOPY, DUODENOSCOPY (EGD);  Surgeon: Santi Rosas MD;  [facility]     ESOPHAGOSCOPY, GASTROSCOPY, DUODENOSCOPY (EGD), COMBINED N/A 11/17/2017    Procedure: COMBINED ESOPHAGOSCOPY, GASTROSCOPY, DUODENOSCOPY (EGD);  EGD;  Surgeon: Santi Rosas MD;  [facility]     ESOPHAGOSCOPY, GASTROSCOPY, DUODENOSCOPY (EGD), COMBINED N/A 12/28/2018    Procedure: EGD;  Surgeon: Santi Rosas MD;  [facility]     ESOPHAGOSCOPY, GASTROSCOPY, DUODENOSCOPY (EGD), COMBINED N/A 12/6/2019    Procedure: ESOPHAGOGASTRODUODENOSCOPY, WITH BIOPSY;  Surgeon: Adam Morton MD;  [facility]     ESOPHAGOSCOPY, GASTROSCOPY, DUODENOSCOPY (EGD), COMBINED N/A 2/13/2020    Procedure:  ESOPHAGOGASTRODUODENOSCOPY (EGD);  Surgeon: Santi Rosas MD;  [facility]     HEAD & NECK SURGERY      2017 at [facility].      IMPLANT GOLD WEIGHT EYELID Right 2017    Procedure: IMPLANT WEIGHT EYELID;  Right Upper Eyelid Weight, right tarsal strip lower eyelid;  Surgeon: Milana Malave MD;  [facility]     IR CHEMO EMBOLIZATION  2019     KNEE SURGERY Left      ORTHOPEDIC SURGERY       PAROTIDECTOMY, RADICAL NECK DISSECTION Right 2017    Procedure: PAROTIDECTOMY, RADICAL NECK DISSECTION;  Right Superfacial Parotidectomy , Facial nerve repair. with [facility] facial nerve monitor.;  Surgeon: Asiya Morgan MD;  [facility]     PICC INSERTION Left 2017    4fr SL BioFlo PICC, 44cm in the L basilic vein w/ tip in the low SVC     RETURN LIVER TRANSPLANT N/A 2019    Procedure: Exploratory laparotomy, hematoma evacuation, abdominal washout;  Surgeon: Александр Ramos MD;  [facility]     TRANSPLANT LIVER RECIPIENT  DONOR N/A 2019    Procedure: TRANSPLANT, LIVER, RECIPIENT,  DONOR;  Surgeon: Александр Ramos MD;  Location: [facility]     VASCULAR SURGERY       Social History     Socioeconomic History     Marital status:      Spouse name: Not on file     Number of children: Not on file     Years of education: Not on file     Highest education level: Bachelor's degree (e.g., BA, AB, BS)   Occupational History     Not on file   Tobacco Use     Smoking status: Former Smoker     Packs/day: 6.00     Years: 30.00     Pack years: 180.00     Types: Cigars     Start date: 2016     Quit date: 10/25/2017     Years since quittin.1     Smokeless tobacco: Former User     Types: Chew     Quit date: 10/31/2017     Tobacco comment: 1 tin per week   Substance and Sexual Activity     Alcohol use: No     Alcohol/week: 0.0 standard drinks     Comment: quit 1996     Drug use: No     Sexual activity: Not Currently     Partners: Female     Birth  control/protection: Condom   Other Topics Concern     Parent/sibling w/ CABG, MI or angioplasty before 65F 55M? Yes   Social History Narrative    [place] [facility] [place] [facility]     Social Determinants of Health     Financial Resource Strain: Low Risk      Difficulty of Paying Living Expenses: Not very hard   Food Insecurity: No Food Insecurity     Worried About Running Out of Food in the Last Year: Never true     Ran Out of Food in the Last Year: Never true   Transportation Needs: No Transportation Needs     Lack of Transportation (Medical): No     Lack of Transportation (Non-Medical): No   Physical Activity: Not on file   Stress: Not on file   Social Connections: Not on file   Intimate Partner Violence: Not on file   Housing Stability: Not on file     Family History   Problem Relation Age of Onset     Prostate Cancer Maternal Grandfather      Substance Abuse Maternal Grandfather         Alcohol     Colon Cancer Father 60     Pancreatic Cancer Father 60     Prostate Cancer Father      Colorectal Cancer Father      Macular Degeneration Father      Cancer Father      Glaucoma Father      Skin Cancer Father      Colorectal Cancer Maternal Grandmother      Cancer Maternal Grandmother      Substance Abuse Maternal Grandmother         Alcohol     Colorectal Cancer Paternal Grandmother      Cancer Mother      Diabetes Mother          3/2016     Cerebrovascular Disease Mother         Passed away in Feb of this year, 80 years old.     Thyroid Disease Mother      Depression Mother      Asthma Sister         Had since birth     Thyroid Disease Sister      Depression Sister      Liver Disease No family hx of      Melanoma No family hx of      Lab Results   Component Value Date    A1C 6.8 2021    A1C 6.0 2020    A1C 6.3 2020    A1C 6.6 2018    A1C 6.5 2017    A1C 7.8 10/25/2016     Lab Results   Component Value Date    WBC 5.6 2021    WBC 4.4 2021     Lab Results   Component Value  Date    RBC 2.98 11/26/2021    RBC 2.35 06/28/2021     Lab Results   Component Value Date    HGB 9.3 11/30/2021    HGB 7.3 06/28/2021     Lab Results   Component Value Date    HCT 29.4 11/30/2021    HCT 22.6 06/28/2021     No components found for: MCT  Lab Results   Component Value Date    MCV 97 11/26/2021    MCV 96 06/28/2021     Lab Results   Component Value Date    MCH 30.5 11/26/2021    MCH 31.1 06/28/2021     Lab Results   Component Value Date    MCHC 31.5 11/26/2021    MCHC 32.3 06/28/2021     Lab Results   Component Value Date    RDW 13.7 11/26/2021    RDW 15.1 06/28/2021     Lab Results   Component Value Date     11/26/2021     06/28/2021     Last Comprehensive Metabolic Panel:  Sodium   Date Value Ref Range Status   11/26/2021 135 133 - 144 mmol/L Final   06/28/2021 137 133 - 144 mmol/L Final     Potassium   Date Value Ref Range Status   11/26/2021 4.4 3.4 - 5.3 mmol/L Final   06/28/2021 4.6 3.4 - 5.3 mmol/L Final     Chloride   Date Value Ref Range Status   11/26/2021 104 94 - 109 mmol/L Final   06/28/2021 107 94 - 109 mmol/L Final     Carbon Dioxide   Date Value Ref Range Status   06/28/2021 25 20 - 32 mmol/L Final     Carbon Dioxide (CO2)   Date Value Ref Range Status   11/26/2021 23 20 - 32 mmol/L Final     Anion Gap   Date Value Ref Range Status   11/26/2021 8 3 - 14 mmol/L Final   06/28/2021 5 3 - 14 mmol/L Final     Glucose   Date Value Ref Range Status   11/26/2021 136 (H) 70 - 99 mg/dL Final   06/28/2021 208 (H) 70 - 99 mg/dL Final     Urea Nitrogen   Date Value Ref Range Status   11/26/2021 37 (H) 7 - 30 mg/dL Final   06/28/2021 33 (H) 7 - 30 mg/dL Final     Creatinine   Date Value Ref Range Status   11/26/2021 1.46 (H) 0.66 - 1.25 mg/dL Final   06/28/2021 1.62 (H) 0.66 - 1.25 mg/dL Final     Creatinine POCT   Date Value Ref Range Status   11/26/2021 1.6 (H) 0.7 - 1.3 mg/dL Final     GFR Estimate   Date Value Ref Range Status   11/26/2021 53 (L) >60 mL/min/1.73m2 Final     Comment:      As of July 11, 2021, eGFR is calculated by the CKD-EPI creatinine equation, without race adjustment. eGFR can be influenced by muscle mass, exercise, and diet. The reported eGFR is an estimation only and is only applicable if the renal function is stable.   06/28/2021 46 (L) >60 mL/min/[1.73_m2] Final     Comment:     Non  GFR Calc  Starting 12/18/2018, serum creatinine based estimated GFR (eGFR) will be   calculated using the Chronic Kidney Disease Epidemiology Collaboration   (CKD-EPI) equation.       GFR, ESTIMATED POCT   Date Value Ref Range Status   11/26/2021 47 (L) >60 mL/min/1.73m2 Final     Calcium   Date Value Ref Range Status   11/26/2021 8.8 8.5 - 10.1 mg/dL Final   06/28/2021 9.1 8.5 - 10.1 mg/dL Final     Lab Results   Component Value Date    AST 13 11/26/2021    AST 9 06/28/2021     Lab Results   Component Value Date    ALT 25 11/26/2021    ALT 20 06/28/2021     No results found for: BILICONJ   Lab Results   Component Value Date    BILITOTAL 0.4 11/26/2021    BILITOTAL 0.5 06/28/2021     Lab Results   Component Value Date    ALBUMIN 3.8 11/26/2021    ALBUMIN 4.0 06/28/2021     Lab Results   Component Value Date    PROTTOTAL 6.9 11/26/2021    PROTTOTAL 7.4 06/28/2021      Lab Results   Component Value Date    ALKPHOS 79 11/26/2021    ALKPHOS 98 06/28/2021     SUBJECTIVE FINDINGS:  A 56-year-old male returns to clinic for foot check.  He relates he finished physical therapy for his heart.  Relates he has numbness, tingling and neuropathy in his feet.  Relates no ulcers or sores since we have seen him last.  Relates he needs his toenails trimmed.  Relates he is using Loprox cream intermittently.  No other specific relieving or aggravating factors.  He wears diabetic shoes.       OBJECTIVE FINDINGS:  DP and PT 2/4 bilaterally.  He has dorsally contracted digits 2-5 bilaterally.  He has incurvated nails with some thickening and dystrophy 1-5 bilaterally to differing degrees.  There is  no erythema, no drainage, no odor, no calor bilaterally.  There are no gross tendon voids bilaterally.  He has a laterally deviated hallux bilaterally.  He has some dry scaly skin on heels bilaterally.     ASSESSMENT/PLAN:  Onychauxis bilaterally.  He is diabetic with peripheral neuropathy and foot deformity.  His protective sensation is intact.  Diagnosis and treatment options discussed with him.  All the nails were reduced bilaterally upon consent.  Advised him on Loprox cream use.  Continue Diabetic shoes and excercising.  Return to clinic and see me in about 3 months.   Moderate level of medical decision making.

## 2021-12-20 DIAGNOSIS — Z94.4 STATUS POST LIVER TRANSPLANTATION (H): ICD-10-CM

## 2021-12-20 RX ORDER — PREDNISONE 5 MG/1
TABLET ORAL
Qty: 90 TABLET | Refills: 3 | Status: SHIPPED | OUTPATIENT
Start: 2021-12-20 | End: 2022-04-29

## 2021-12-22 ENCOUNTER — LAB (OUTPATIENT)
Dept: LAB | Facility: CLINIC | Age: 56
End: 2021-12-22
Attending: INTERNAL MEDICINE
Payer: MEDICARE

## 2021-12-22 ENCOUNTER — ALLIED HEALTH/NURSE VISIT (OUTPATIENT)
Dept: TRANSPLANT | Facility: CLINIC | Age: 56
End: 2021-12-22
Attending: INTERNAL MEDICINE
Payer: MEDICARE

## 2021-12-22 ENCOUNTER — TELEPHONE (OUTPATIENT)
Dept: PHARMACY | Facility: CLINIC | Age: 56
End: 2021-12-22

## 2021-12-22 VITALS — SYSTOLIC BLOOD PRESSURE: 104 MMHG | DIASTOLIC BLOOD PRESSURE: 65 MMHG | OXYGEN SATURATION: 100 % | HEART RATE: 99 BPM

## 2021-12-22 DIAGNOSIS — N18.32 ANEMIA OF CHRONIC RENAL FAILURE, STAGE 3B (H): ICD-10-CM

## 2021-12-22 DIAGNOSIS — D63.1 ANEMIA OF CHRONIC RENAL FAILURE, STAGE 3B (H): ICD-10-CM

## 2021-12-22 DIAGNOSIS — N18.32 STAGE 3B CHRONIC KIDNEY DISEASE (H): ICD-10-CM

## 2021-12-22 DIAGNOSIS — Z94.4 LIVER REPLACED BY TRANSPLANT (H): ICD-10-CM

## 2021-12-22 DIAGNOSIS — N18.32 STAGE 3B CHRONIC KIDNEY DISEASE (H): Primary | ICD-10-CM

## 2021-12-22 LAB
ALBUMIN SERPL-MCNC: 3.6 G/DL (ref 3.4–5)
ALP SERPL-CCNC: 94 U/L (ref 40–150)
ALT SERPL W P-5'-P-CCNC: 22 U/L (ref 0–70)
ANION GAP SERPL CALCULATED.3IONS-SCNC: 8 MMOL/L (ref 3–14)
AST SERPL W P-5'-P-CCNC: 9 U/L (ref 0–45)
BILIRUB DIRECT SERPL-MCNC: <0.1 MG/DL (ref 0–0.2)
BILIRUB SERPL-MCNC: 0.5 MG/DL (ref 0.2–1.3)
BUN SERPL-MCNC: 41 MG/DL (ref 7–30)
CALCIUM SERPL-MCNC: 8.8 MG/DL (ref 8.5–10.1)
CHLORIDE BLD-SCNC: 107 MMOL/L (ref 94–109)
CO2 SERPL-SCNC: 26 MMOL/L (ref 20–32)
CREAT SERPL-MCNC: 1.71 MG/DL (ref 0.66–1.25)
ERYTHROCYTE [DISTWIDTH] IN BLOOD BY AUTOMATED COUNT: 15.1 % (ref 10–15)
GFR SERPL CREATININE-BSD FRML MDRD: 46 ML/MIN/1.73M2
GLUCOSE BLD-MCNC: 167 MG/DL (ref 70–99)
HCT VFR BLD AUTO: 26 % (ref 40–53)
HGB BLD-MCNC: 8 G/DL (ref 13.3–17.7)
MCH RBC QN AUTO: 31 PG (ref 26.5–33)
MCHC RBC AUTO-ENTMCNC: 30.8 G/DL (ref 31.5–36.5)
MCV RBC AUTO: 101 FL (ref 78–100)
PLATELET # BLD AUTO: 132 10E3/UL (ref 150–450)
POTASSIUM BLD-SCNC: 4.6 MMOL/L (ref 3.4–5.3)
PROT SERPL-MCNC: 6.8 G/DL (ref 6.8–8.8)
RBC # BLD AUTO: 2.58 10E6/UL (ref 4.4–5.9)
SODIUM SERPL-SCNC: 141 MMOL/L (ref 133–144)
WBC # BLD AUTO: 6.6 10E3/UL (ref 4–11)

## 2021-12-22 PROCEDURE — 82248 BILIRUBIN DIRECT: CPT | Performed by: PATHOLOGY

## 2021-12-22 PROCEDURE — 36415 COLL VENOUS BLD VENIPUNCTURE: CPT | Performed by: PATHOLOGY

## 2021-12-22 PROCEDURE — 85027 COMPLETE CBC AUTOMATED: CPT | Performed by: PATHOLOGY

## 2021-12-22 PROCEDURE — 80053 COMPREHEN METABOLIC PANEL: CPT | Performed by: PATHOLOGY

## 2021-12-22 PROCEDURE — 250N000011 HC RX IP 250 OP 636: Performed by: INTERNAL MEDICINE

## 2021-12-22 PROCEDURE — 96372 THER/PROPH/DIAG INJ SC/IM: CPT | Performed by: INTERNAL MEDICINE

## 2021-12-22 RX ADMIN — DARBEPOETIN ALFA 60 MCG: 60 INJECTION, SOLUTION INTRAVENOUS; SUBCUTANEOUS at 10:28

## 2021-12-22 NOTE — PROGRESS NOTES
Chief Complaint   Patient presents with     Allied Health Visit     aranesp injection     Blood pressure 104/65, pulse 99, SpO2 100 %.    Frequency: 14 days  Most recent or today's HGB: 8.0   Date:   Date of lat dose:   HGB associated with last dose given: 9.3    Blood Pressure:104/65    Diagnosis: CKD stage 3 / anemia     Ordered by:Maira HURTADO MD  VIS Offered: yes    Double Checked by: Yvonne Moore    See MAR for administration details    Pt's first name, last name and  verified prior to medication administration, injection given without complications or questions.     Chasity Acosta, CMA

## 2021-12-24 ENCOUNTER — TELEPHONE (OUTPATIENT)
Dept: ENDOCRINOLOGY | Facility: CLINIC | Age: 56
End: 2021-12-24
Payer: MEDICARE

## 2021-12-24 DIAGNOSIS — E11.3293 TYPE 2 DIABETES MELLITUS WITH MILD NONPROLIFERATIVE RETINOPATHY OF BOTH EYES WITHOUT MACULAR EDEMA, UNSPECIFIED WHETHER LONG TERM INSULIN USE (H): ICD-10-CM

## 2021-12-24 NOTE — TELEPHONE ENCOUNTER
"Current Benjamin Sensor on med list is for the Benjamin 2, please send new RX for Freestyle Benjamin 14 Day Sensor.    Please write Rx with these specifications for Medicare compliance    Freestyle Benjamin 14 Day Sensors  Qty: 2  Refills: can have up to 5 additional refills  Sig \"CHANGE EVERY 14 DAYS\"    Please call [phone] and speak to one of our Durable Medical Equipment Team members if you have any questions.    "

## 2022-01-01 NOTE — PROGRESS NOTES
Pt attended the full hour of the Psycho-Education group of the Illness Management and Recovery modules #2: Practical Facts About Mental Illness. Pt was attentive to the material and was able to comprehend this. Pt was hyperverbal and did need redirection at times.   I gave the group the [employer] resource and encouraged the group to attend  the NEYDA walk in September to help combat stigma. Pt was very interested in this and asked me to write down the neyda web site.       06/24/20 1543   Psycho Education   Type of Intervention structured groups   Response participates with cues/redirection   Hours 1   Treatment Detail IMR#2: Practical Facts About Mental Illness      negative...

## 2022-01-04 ENCOUNTER — VIRTUAL VISIT (OUTPATIENT)
Dept: BEHAVIORAL HEALTH | Facility: CLINIC | Age: 57
End: 2022-01-04
Payer: MEDICARE

## 2022-01-04 DIAGNOSIS — Z53.9 ERRONEOUS ENCOUNTER--DISREGARD: Primary | ICD-10-CM

## 2022-01-04 ASSESSMENT — PATIENT HEALTH QUESTIONNAIRE - PHQ9
SUM OF ALL RESPONSES TO PHQ QUESTIONS 1-9: 4
SUM OF ALL RESPONSES TO PHQ QUESTIONS 1-9: 4

## 2022-01-05 ENCOUNTER — TELEPHONE (OUTPATIENT)
Dept: PHARMACY | Facility: CLINIC | Age: 57
End: 2022-01-05

## 2022-01-05 ENCOUNTER — LAB (OUTPATIENT)
Dept: LAB | Facility: CLINIC | Age: 57
End: 2022-01-05
Attending: INTERNAL MEDICINE
Payer: MEDICARE

## 2022-01-05 ENCOUNTER — DOCUMENTATION ONLY (OUTPATIENT)
Dept: TRANSPLANT | Facility: CLINIC | Age: 57
End: 2022-01-05

## 2022-01-05 ENCOUNTER — ALLIED HEALTH/NURSE VISIT (OUTPATIENT)
Dept: TRANSPLANT | Facility: CLINIC | Age: 57
End: 2022-01-05
Attending: INTERNAL MEDICINE
Payer: MEDICARE

## 2022-01-05 VITALS — HEART RATE: 78 BPM | OXYGEN SATURATION: 99 % | SYSTOLIC BLOOD PRESSURE: 92 MMHG | DIASTOLIC BLOOD PRESSURE: 56 MMHG

## 2022-01-05 DIAGNOSIS — D63.1 ANEMIA OF CHRONIC RENAL FAILURE, STAGE 3B (H): ICD-10-CM

## 2022-01-05 DIAGNOSIS — N18.32 STAGE 3B CHRONIC KIDNEY DISEASE (H): ICD-10-CM

## 2022-01-05 DIAGNOSIS — N18.32 ANEMIA OF CHRONIC RENAL FAILURE, STAGE 3B (H): ICD-10-CM

## 2022-01-05 DIAGNOSIS — N18.32 STAGE 3B CHRONIC KIDNEY DISEASE (H): Primary | ICD-10-CM

## 2022-01-05 DIAGNOSIS — I25.10 CORONARY ARTERY DISEASE INVOLVING NATIVE CORONARY ARTERY OF NATIVE HEART WITHOUT ANGINA PECTORIS: ICD-10-CM

## 2022-01-05 LAB
ANION GAP SERPL CALCULATED.3IONS-SCNC: 15 MMOL/L (ref 3–14)
BUN SERPL-MCNC: 63 MG/DL (ref 7–30)
CALCIUM SERPL-MCNC: 9.7 MG/DL (ref 8.5–10.1)
CHLORIDE BLD-SCNC: 101 MMOL/L (ref 94–109)
CO2 SERPL-SCNC: 19 MMOL/L (ref 20–32)
CREAT SERPL-MCNC: 2.41 MG/DL (ref 0.66–1.25)
GFR SERPL CREATININE-BSD FRML MDRD: 31 ML/MIN/1.73M2
GLUCOSE BLD-MCNC: 236 MG/DL (ref 70–99)
HCT VFR BLD AUTO: 22.3 % (ref 40–53)
HGB BLD-MCNC: 6.9 G/DL (ref 13.3–17.7)
POTASSIUM BLD-SCNC: 4.1 MMOL/L (ref 3.4–5.3)
SODIUM SERPL-SCNC: 135 MMOL/L (ref 133–144)

## 2022-01-05 PROCEDURE — 85018 HEMOGLOBIN: CPT | Performed by: PATHOLOGY

## 2022-01-05 PROCEDURE — 85014 HEMATOCRIT: CPT | Performed by: PATHOLOGY

## 2022-01-05 PROCEDURE — 250N000011 HC RX IP 250 OP 636: Performed by: INTERNAL MEDICINE

## 2022-01-05 PROCEDURE — 80048 BASIC METABOLIC PNL TOTAL CA: CPT | Performed by: PATHOLOGY

## 2022-01-05 PROCEDURE — 96372 THER/PROPH/DIAG INJ SC/IM: CPT | Performed by: INTERNAL MEDICINE

## 2022-01-05 PROCEDURE — 36415 COLL VENOUS BLD VENIPUNCTURE: CPT | Performed by: PATHOLOGY

## 2022-01-05 RX ADMIN — DARBEPOETIN ALFA 100 MCG: 100 INJECTION, SOLUTION INTRAVENOUS; SUBCUTANEOUS at 13:10

## 2022-01-05 ASSESSMENT — PATIENT HEALTH QUESTIONNAIRE - PHQ9: SUM OF ALL RESPONSES TO PHQ QUESTIONS 1-9: 4

## 2022-01-05 NOTE — PROGRESS NOTES
Patient here today for aranesp injection. Hgb 6.9 from 8.0 Dec 22. Writer spoke to patient who was placed in Neg air low room due to slight cough. Patient reports this has been going on since Saturday. He reports feeling a little run down tired. A little short of breath. He does not think he has been anyone with COVID or flu like symptoms and he has been vaccinated and boosted. Reviewed with Dr. Rao. Ok to give aranesp today at 100 mcg. Would change to weekly dosing.  Communicated change to Cristin Blum RNCC and to Nereida to relay to patient. No other concerns at this time.  Lupe Owens LPN  Nephrology  [phone]

## 2022-01-05 NOTE — PROGRESS NOTES
Noted elevated creatinine and low Hgb.  Miller is followed by anemia clinic.  I sent a message to all nephrology nurses and to Dr. Rao.

## 2022-01-05 NOTE — TELEPHONE ENCOUNTER
Anemia Management Note  SUBJECTIVE/OBJECTIVE:  Referred by Dr. Eloy Rao on 2021  Primary Diagnosis: Anemia in Chronic Kidney Disease (N18.3, D63.1)   3b  Secondary Diagnosis:  Chronic Kidney Disease, Stage 3 (N18.3)  3b  Liver Tx: 2019  Hgb goal range:  9-10  Epo/Darbo: Aranesp 100mcg every 7 days for Hgb <10.  In Clinic.  *22: dose changed to weekly per Dr. Rao  *dose increased to 100mcg starting 22  *dose increased to 60mcg starting with  21 dose  Iron regimen:  NA.  Iron levels stable  Labs : 2022  Recent IVELISSE use, transfusion, IV iron: Aranesp   RX/TX plans :  6/10/2022     No history of stroke, MI and blood clots.  History of hepatocellular carcinoma - s/p TACE 2019 and liver transplant 2019.     Contact:            Ok to leave message regarding scheduling, medical, and billing per consent to communicate dated 10/2/19                              OK to speak with Davi Saunders (friend/POA) regarding scheduling, medical, and billing per consent to communicate dated 10/2/19    Anemia Latest Ref Rng & Units 10/19/2021 2021 2021 2021 2021 2021 2022   IVELISSE Dose - - - 60 mcg - 60 mcg 60 mcg 100 mcg   Hemoglobin 13.3 - 17.7 g/dL 11.6(L) 11.3(L) 9.2(L) 9.1(L) 9.3(L) 8.0(L) 6.9(LL)   TSAT 15 - 46 % - 45 44 - - - -   Ferritin 26 - 388 ng/mL - 479(H) 400(H) - - - -   PRBCs - - - - - - - -     BP Readings from Last 3 Encounters:   22 92/56   21 104/65   21 92/56     Wt Readings from Last 2 Encounters:   21 176 lb 9.6 oz (80.1 kg)   10/04/21 171 lb (77.6 kg)           ASSESSMENT:  Hgb:Not at goal   TSat: at goal >30% Ferritin: At goal (>100ng/mL)    PLAN:  Dose with aranesp and RTC for hgb then aranesp if needed in 1 week(s)    Orders needed to be renewed (for next follow-up date) in EPIC: None    Iron labs due:  2022    Plan discussed with:  No call  Plan provided by:  adri    NEXT FOLLOW-UP DATE:   1/12/22    Jie Blum RN   [facility]  [address] [address]   [email or web address]   Office : [phone]  Fax: [phone]

## 2022-01-10 ENCOUNTER — APPOINTMENT (OUTPATIENT)
Dept: GENERAL RADIOLOGY | Facility: CLINIC | Age: 57
DRG: 683 | End: 2022-01-10
Attending: EMERGENCY MEDICINE
Payer: MEDICARE

## 2022-01-10 ENCOUNTER — HOSPITAL ENCOUNTER (INPATIENT)
Facility: CLINIC | Age: 57
LOS: 2 days | Discharge: HOME | DRG: 683 | End: 2022-01-13
Attending: EMERGENCY MEDICINE | Admitting: INTERNAL MEDICINE
Payer: MEDICARE

## 2022-01-10 DIAGNOSIS — I25.10 ATHEROSCLEROSIS OF NATIVE CORONARY ARTERY OF NATIVE HEART WITHOUT ANGINA PECTORIS: ICD-10-CM

## 2022-01-10 DIAGNOSIS — R07.9 CHEST PAIN, UNSPECIFIED TYPE: ICD-10-CM

## 2022-01-10 DIAGNOSIS — Z11.52 ENCOUNTER FOR SCREENING LABORATORY TESTING FOR SEVERE ACUTE RESPIRATORY SYNDROME CORONAVIRUS 2 (SARS-COV-2): ICD-10-CM

## 2022-01-10 DIAGNOSIS — R07.89 OTHER CHEST PAIN: ICD-10-CM

## 2022-01-10 DIAGNOSIS — Z95.5 STENTED CORONARY ARTERY: ICD-10-CM

## 2022-01-10 DIAGNOSIS — I25.2 OLD MYOCARDIAL INFARCTION: ICD-10-CM

## 2022-01-10 LAB
ALBUMIN SERPL-MCNC: 3.6 G/DL (ref 3.4–5)
ALP SERPL-CCNC: 106 U/L (ref 40–150)
ALT SERPL W P-5'-P-CCNC: 19 U/L (ref 0–70)
ANION GAP SERPL CALCULATED.3IONS-SCNC: 10 MMOL/L (ref 3–14)
AST SERPL W P-5'-P-CCNC: 25 U/L (ref 0–45)
BASOPHILS # BLD AUTO: 0 10E3/UL (ref 0–0.2)
BASOPHILS NFR BLD AUTO: 1 %
BILIRUB SERPL-MCNC: 0.4 MG/DL (ref 0.2–1.3)
BUN SERPL-MCNC: 78 MG/DL (ref 7–30)
CALCIUM SERPL-MCNC: 10.4 MG/DL (ref 8.5–10.1)
CHLORIDE BLD-SCNC: 106 MMOL/L (ref 94–109)
CO2 SERPL-SCNC: 19 MMOL/L (ref 20–32)
CREAT SERPL-MCNC: 2.21 MG/DL (ref 0.66–1.25)
EOSINOPHIL # BLD AUTO: 0 10E3/UL (ref 0–0.7)
EOSINOPHIL NFR BLD AUTO: 1 %
ERYTHROCYTE [DISTWIDTH] IN BLOOD BY AUTOMATED COUNT: 14.9 % (ref 10–15)
GFR SERPL CREATININE-BSD FRML MDRD: 34 ML/MIN/1.73M2
GLUCOSE BLD-MCNC: 195 MG/DL (ref 70–99)
HCO3 BLDV-SCNC: 19 MMOL/L (ref 21–28)
HCT VFR BLD AUTO: 22.4 % (ref 40–53)
HGB BLD-MCNC: 6.9 G/DL (ref 13.3–17.7)
HOLD SPECIMEN: NORMAL
IMM GRANULOCYTES # BLD: 0 10E3/UL
IMM GRANULOCYTES NFR BLD: 1 %
LACTATE BLD-SCNC: 0.7 MMOL/L
LYMPHOCYTES # BLD AUTO: 0.8 10E3/UL (ref 0.8–5.3)
LYMPHOCYTES NFR BLD AUTO: 21 %
MAGNESIUM SERPL-MCNC: 2.3 MG/DL (ref 1.6–2.3)
MCH RBC QN AUTO: 30.8 PG (ref 26.5–33)
MCHC RBC AUTO-ENTMCNC: 30.8 G/DL (ref 31.5–36.5)
MCV RBC AUTO: 100 FL (ref 78–100)
MONOCYTES # BLD AUTO: 0.2 10E3/UL (ref 0–1.3)
MONOCYTES NFR BLD AUTO: 6 %
NEUTROPHILS # BLD AUTO: 2.8 10E3/UL (ref 1.6–8.3)
NEUTROPHILS NFR BLD AUTO: 70 %
NRBC # BLD AUTO: 0 10E3/UL
NRBC BLD AUTO-RTO: 0 /100
PCO2 BLDV: 34 MM HG (ref 40–50)
PH BLDV: 7.37 [PH] (ref 7.32–7.43)
PLATELET # BLD AUTO: 225 10E3/UL (ref 150–450)
PO2 BLDV: 23 MM HG (ref 25–47)
POTASSIUM BLD-SCNC: 5.8 MMOL/L (ref 3.4–5.3)
PROT SERPL-MCNC: 8.6 G/DL (ref 6.8–8.8)
RBC # BLD AUTO: 2.24 10E6/UL (ref 4.4–5.9)
SAO2 % BLDV: 40 % (ref 94–100)
SODIUM SERPL-SCNC: 135 MMOL/L (ref 133–144)
TROPONIN I SERPL HS-MCNC: 5 NG/L
WBC # BLD AUTO: 3.9 10E3/UL (ref 4–11)

## 2022-01-10 PROCEDURE — 31500 INSERT EMERGENCY AIRWAY: CPT | Performed by: EMERGENCY MEDICINE

## 2022-01-10 PROCEDURE — 85004 AUTOMATED DIFF WBC COUNT: CPT | Performed by: EMERGENCY MEDICINE

## 2022-01-10 PROCEDURE — 83036 HEMOGLOBIN GLYCOSYLATED A1C: CPT | Performed by: PHYSICIAN ASSISTANT

## 2022-01-10 PROCEDURE — 84484 ASSAY OF TROPONIN QUANT: CPT | Performed by: EMERGENCY MEDICINE

## 2022-01-10 PROCEDURE — 82803 BLOOD GASES ANY COMBINATION: CPT

## 2022-01-10 PROCEDURE — 83735 ASSAY OF MAGNESIUM: CPT | Performed by: EMERGENCY MEDICINE

## 2022-01-10 PROCEDURE — 93005 ELECTROCARDIOGRAM TRACING: CPT | Performed by: EMERGENCY MEDICINE

## 2022-01-10 PROCEDURE — 93010 ELECTROCARDIOGRAM REPORT: CPT | Performed by: EMERGENCY MEDICINE

## 2022-01-10 PROCEDURE — 71045 X-RAY EXAM CHEST 1 VIEW: CPT

## 2022-01-10 PROCEDURE — 99285 EMERGENCY DEPT VISIT HI MDM: CPT | Mod: 25 | Performed by: EMERGENCY MEDICINE

## 2022-01-10 PROCEDURE — 83605 ASSAY OF LACTIC ACID: CPT

## 2022-01-10 PROCEDURE — 87636 SARSCOV2 & INF A&B AMP PRB: CPT | Performed by: EMERGENCY MEDICINE

## 2022-01-10 PROCEDURE — C9803 HOPD COVID-19 SPEC COLLECT: HCPCS | Performed by: EMERGENCY MEDICINE

## 2022-01-10 PROCEDURE — 36415 COLL VENOUS BLD VENIPUNCTURE: CPT | Performed by: EMERGENCY MEDICINE

## 2022-01-10 PROCEDURE — 80053 COMPREHEN METABOLIC PANEL: CPT | Performed by: EMERGENCY MEDICINE

## 2022-01-11 ENCOUNTER — TELEPHONE (OUTPATIENT)
Dept: PHARMACY | Facility: CLINIC | Age: 57
End: 2022-01-11

## 2022-01-11 ENCOUNTER — APPOINTMENT (OUTPATIENT)
Dept: CARDIOLOGY | Facility: CLINIC | Age: 57
DRG: 683 | End: 2022-01-11
Attending: EMERGENCY MEDICINE
Payer: MEDICARE

## 2022-01-11 PROBLEM — R07.9 CHEST PAIN, UNSPECIFIED TYPE: Status: ACTIVE | Noted: 2022-01-11

## 2022-01-11 LAB
ABO/RH(D): NORMAL
ALBUMIN SERPL-MCNC: 3.3 G/DL (ref 3.4–5)
ALBUMIN UR-MCNC: NEGATIVE MG/DL
ALP SERPL-CCNC: 98 U/L (ref 40–150)
ALT SERPL W P-5'-P-CCNC: 15 U/L (ref 0–70)
ANION GAP SERPL CALCULATED.3IONS-SCNC: 8 MMOL/L (ref 3–14)
ANTIBODY SCREEN: NEGATIVE
APPEARANCE UR: CLEAR
AST SERPL W P-5'-P-CCNC: 12 U/L (ref 0–45)
BACTERIA #/AREA URNS HPF: ABNORMAL /HPF
BILIRUB SERPL-MCNC: 0.3 MG/DL (ref 0.2–1.3)
BILIRUB UR QL STRIP: NEGATIVE
BLD PROD TYP BPU: NORMAL
BLD PROD TYP BPU: NORMAL
BLOOD COMPONENT TYPE: NORMAL
BLOOD COMPONENT TYPE: NORMAL
BUN SERPL-MCNC: 68 MG/DL (ref 7–30)
CALCIUM SERPL-MCNC: 9.7 MG/DL (ref 8.5–10.1)
CHLORIDE BLD-SCNC: 109 MMOL/L (ref 94–109)
CO2 SERPL-SCNC: 20 MMOL/L (ref 20–32)
CODING SYSTEM: NORMAL
CODING SYSTEM: NORMAL
COLOR UR AUTO: ABNORMAL
CREAT SERPL-MCNC: 2.03 MG/DL (ref 0.66–1.25)
CROSSMATCH: NORMAL
CROSSMATCH: NORMAL
CRP SERPL-MCNC: 44 MG/L (ref 0–8)
ERYTHROCYTE [DISTWIDTH] IN BLOOD BY AUTOMATED COUNT: 15.4 % (ref 10–15)
FLUAV RNA SPEC QL NAA+PROBE: NEGATIVE
FLUBV RNA RESP QL NAA+PROBE: NEGATIVE
GFR SERPL CREATININE-BSD FRML MDRD: 38 ML/MIN/1.73M2
GLUCOSE BLD-MCNC: 156 MG/DL (ref 70–99)
GLUCOSE BLDC GLUCOMTR-MCNC: 148 MG/DL (ref 70–99)
GLUCOSE BLDC GLUCOMTR-MCNC: 169 MG/DL (ref 70–99)
GLUCOSE UR STRIP-MCNC: >=1000 MG/DL
GRANULAR CAST: 3 /LPF
HBA1C MFR BLD: 6 % (ref 0–5.6)
HCT VFR BLD AUTO: 22.9 % (ref 40–53)
HGB BLD-MCNC: 7 G/DL (ref 13.3–17.7)
HGB UR QL STRIP: NEGATIVE
INR PPP: 1.13 (ref 0.85–1.15)
ISSUE DATE AND TIME: NORMAL
ISSUE DATE AND TIME: NORMAL
KETONES UR STRIP-MCNC: NEGATIVE MG/DL
LACTATE SERPL-SCNC: 0.5 MMOL/L (ref 0.7–2)
LEUKOCYTE ESTERASE UR QL STRIP: NEGATIVE
LIPASE SERPL-CCNC: 115 U/L (ref 73–393)
LVEF ECHO: NORMAL
MCH RBC QN AUTO: 30.2 PG (ref 26.5–33)
MCHC RBC AUTO-ENTMCNC: 30.6 G/DL (ref 31.5–36.5)
MCV RBC AUTO: 99 FL (ref 78–100)
MUCOUS THREADS #/AREA URNS LPF: PRESENT /LPF
NITRATE UR QL: NEGATIVE
NT-PROBNP SERPL-MCNC: 314 PG/ML (ref 0–900)
PH UR STRIP: 5 [PH] (ref 5–7)
PLATELET # BLD AUTO: 153 10E3/UL (ref 150–450)
POTASSIUM BLD-SCNC: 5 MMOL/L (ref 3.4–5.3)
PROCALCITONIN SERPL-MCNC: 0.11 NG/ML
PROT SERPL-MCNC: 7.6 G/DL (ref 6.8–8.8)
RBC # BLD AUTO: 2.32 10E6/UL (ref 4.4–5.9)
RBC URINE: <1 /HPF
SARS-COV-2 RNA RESP QL NAA+PROBE: NEGATIVE
SODIUM SERPL-SCNC: 137 MMOL/L (ref 133–144)
SP GR UR STRIP: 1.02 (ref 1–1.03)
SPECIMEN EXPIRATION DATE: NORMAL
TROPONIN I SERPL HS-MCNC: 6 NG/L
TROPONIN I SERPL HS-MCNC: 7 NG/L
UNIT ABO/RH: NORMAL
UNIT ABO/RH: NORMAL
UNIT NUMBER: NORMAL
UNIT NUMBER: NORMAL
UNIT STATUS: NORMAL
UNIT STATUS: NORMAL
UNIT TYPE ISBT: 5100
UNIT TYPE ISBT: 5100
UROBILINOGEN UR STRIP-MCNC: NORMAL MG/DL
WBC # BLD AUTO: 3.4 10E3/UL (ref 4–11)
WBC URINE: 1 /HPF

## 2022-01-11 PROCEDURE — 86140 C-REACTIVE PROTEIN: CPT | Performed by: PHYSICIAN ASSISTANT

## 2022-01-11 PROCEDURE — 83690 ASSAY OF LIPASE: CPT | Performed by: PHYSICIAN ASSISTANT

## 2022-01-11 PROCEDURE — 99223 1ST HOSP IP/OBS HIGH 75: CPT | Mod: AI | Performed by: INTERNAL MEDICINE

## 2022-01-11 PROCEDURE — 255N000002 HC RX 255 OP 636: Performed by: STUDENT IN AN ORGANIZED HEALTH CARE EDUCATION/TRAINING PROGRAM

## 2022-01-11 PROCEDURE — 81001 URINALYSIS AUTO W/SCOPE: CPT | Performed by: EMERGENCY MEDICINE

## 2022-01-11 PROCEDURE — 82607 VITAMIN B-12: CPT | Performed by: PHYSICIAN ASSISTANT

## 2022-01-11 PROCEDURE — 36415 COLL VENOUS BLD VENIPUNCTURE: CPT | Performed by: EMERGENCY MEDICINE

## 2022-01-11 PROCEDURE — P9016 RBC LEUKOCYTES REDUCED: HCPCS | Performed by: EMERGENCY MEDICINE

## 2022-01-11 PROCEDURE — 250N000012 HC RX MED GY IP 250 OP 636 PS 637: Performed by: EMERGENCY MEDICINE

## 2022-01-11 PROCEDURE — 250N000013 HC RX MED GY IP 250 OP 250 PS 637: Performed by: EMERGENCY MEDICINE

## 2022-01-11 PROCEDURE — 87486 CHLMYD PNEUM DNA AMP PROBE: CPT | Performed by: PHYSICIAN ASSISTANT

## 2022-01-11 PROCEDURE — 87633 RESP VIRUS 12-25 TARGETS: CPT | Performed by: PHYSICIAN ASSISTANT

## 2022-01-11 PROCEDURE — 99207 PR APP CREDIT; MD BILLING SHARED VISIT: CPT | Performed by: PHYSICIAN ASSISTANT

## 2022-01-11 PROCEDURE — 250N000013 HC RX MED GY IP 250 OP 250 PS 637: Performed by: PHYSICIAN ASSISTANT

## 2022-01-11 PROCEDURE — 87385 HISTOPLASMA CAPSUL AG IA: CPT | Performed by: STUDENT IN AN ORGANIZED HEALTH CARE EDUCATION/TRAINING PROGRAM

## 2022-01-11 PROCEDURE — 86850 RBC ANTIBODY SCREEN: CPT | Performed by: EMERGENCY MEDICINE

## 2022-01-11 PROCEDURE — 999N000208 ECHOCARDIOGRAM COMPLETE

## 2022-01-11 PROCEDURE — 84145 PROCALCITONIN (PCT): CPT | Performed by: PHYSICIAN ASSISTANT

## 2022-01-11 PROCEDURE — 86923 COMPATIBILITY TEST ELECTRIC: CPT | Performed by: EMERGENCY MEDICINE

## 2022-01-11 PROCEDURE — 84484 ASSAY OF TROPONIN QUANT: CPT | Performed by: EMERGENCY MEDICINE

## 2022-01-11 PROCEDURE — 93306 TTE W/DOPPLER COMPLETE: CPT | Mod: 26 | Performed by: INTERNAL MEDICINE

## 2022-01-11 PROCEDURE — 83605 ASSAY OF LACTIC ACID: CPT | Performed by: EMERGENCY MEDICINE

## 2022-01-11 PROCEDURE — 85610 PROTHROMBIN TIME: CPT | Performed by: PHYSICIAN ASSISTANT

## 2022-01-11 PROCEDURE — 83880 ASSAY OF NATRIURETIC PEPTIDE: CPT | Performed by: PHYSICIAN ASSISTANT

## 2022-01-11 PROCEDURE — 120N000002 HC R&B MED SURG/OB UMMC

## 2022-01-11 PROCEDURE — 84484 ASSAY OF TROPONIN QUANT: CPT | Performed by: PHYSICIAN ASSISTANT

## 2022-01-11 PROCEDURE — 86923 COMPATIBILITY TEST ELECTRIC: CPT | Performed by: PHYSICIAN ASSISTANT

## 2022-01-11 PROCEDURE — 36415 COLL VENOUS BLD VENIPUNCTURE: CPT | Performed by: PHYSICIAN ASSISTANT

## 2022-01-11 PROCEDURE — 85027 COMPLETE CBC AUTOMATED: CPT | Performed by: PHYSICIAN ASSISTANT

## 2022-01-11 PROCEDURE — 80053 COMPREHEN METABOLIC PANEL: CPT | Performed by: PHYSICIAN ASSISTANT

## 2022-01-11 RX ORDER — MULTIVITAMIN,THERAPEUTIC
1 TABLET ORAL DAILY
Status: DISCONTINUED | OUTPATIENT
Start: 2022-01-12 | End: 2022-01-13 | Stop reason: HOSPADM

## 2022-01-11 RX ORDER — LAMIVUDINE 100 MG/1
100 TABLET, FILM COATED ORAL DAILY
Status: DISCONTINUED | OUTPATIENT
Start: 2022-01-11 | End: 2022-01-13 | Stop reason: HOSPADM

## 2022-01-11 RX ORDER — TAMSULOSIN HYDROCHLORIDE 0.4 MG/1
0.4 CAPSULE ORAL DAILY
Status: DISCONTINUED | OUTPATIENT
Start: 2022-01-12 | End: 2022-01-13 | Stop reason: HOSPADM

## 2022-01-11 RX ORDER — METHOCARBAMOL 750 MG/1
750 TABLET, FILM COATED ORAL 3 TIMES DAILY PRN
Status: DISCONTINUED | OUTPATIENT
Start: 2022-01-11 | End: 2022-01-11

## 2022-01-11 RX ORDER — POLYETHYLENE GLYCOL 3350 17 G/17G
17 POWDER, FOR SOLUTION ORAL DAILY
Status: DISCONTINUED | OUTPATIENT
Start: 2022-01-12 | End: 2022-01-13 | Stop reason: HOSPADM

## 2022-01-11 RX ORDER — VITAMIN B COMPLEX
50 TABLET ORAL DAILY
Status: DISCONTINUED | OUTPATIENT
Start: 2022-01-12 | End: 2022-01-13 | Stop reason: HOSPADM

## 2022-01-11 RX ORDER — AMOXICILLIN 250 MG
1 CAPSULE ORAL 2 TIMES DAILY PRN
Status: DISCONTINUED | OUTPATIENT
Start: 2022-01-11 | End: 2022-01-13 | Stop reason: HOSPADM

## 2022-01-11 RX ORDER — LANOLIN ALCOHOL/MO/W.PET/CERES
1000 CREAM (GRAM) TOPICAL DAILY
Status: DISCONTINUED | OUTPATIENT
Start: 2022-01-12 | End: 2022-01-13 | Stop reason: HOSPADM

## 2022-01-11 RX ORDER — LIDOCAINE 40 MG/G
CREAM TOPICAL
Status: DISCONTINUED | OUTPATIENT
Start: 2022-01-11 | End: 2022-01-13 | Stop reason: HOSPADM

## 2022-01-11 RX ORDER — ARIPIPRAZOLE 5 MG/1
5 TABLET ORAL DAILY
Status: DISCONTINUED | OUTPATIENT
Start: 2022-01-11 | End: 2022-01-11

## 2022-01-11 RX ORDER — MICONAZOLE NITRATE 20 MG/G
CREAM TOPICAL 2 TIMES DAILY
Status: DISCONTINUED | OUTPATIENT
Start: 2022-01-12 | End: 2022-01-13 | Stop reason: HOSPADM

## 2022-01-11 RX ORDER — NICOTINE POLACRILEX 4 MG
15-30 LOZENGE BUCCAL
Status: DISCONTINUED | OUTPATIENT
Start: 2022-01-11 | End: 2022-01-13 | Stop reason: HOSPADM

## 2022-01-11 RX ORDER — ASPIRIN 81 MG/1
162 TABLET ORAL DAILY
Status: DISCONTINUED | OUTPATIENT
Start: 2022-01-12 | End: 2022-01-13 | Stop reason: HOSPADM

## 2022-01-11 RX ORDER — ONDANSETRON 4 MG/1
4 TABLET, ORALLY DISINTEGRATING ORAL EVERY 6 HOURS PRN
Status: DISCONTINUED | OUTPATIENT
Start: 2022-01-11 | End: 2022-01-13 | Stop reason: HOSPADM

## 2022-01-11 RX ORDER — PANTOPRAZOLE SODIUM 40 MG/1
40 TABLET, DELAYED RELEASE ORAL
Status: DISCONTINUED | OUTPATIENT
Start: 2022-01-12 | End: 2022-01-13 | Stop reason: HOSPADM

## 2022-01-11 RX ORDER — PREDNISONE 5 MG/1
5 TABLET ORAL DAILY
Status: DISCONTINUED | OUTPATIENT
Start: 2022-01-11 | End: 2022-01-13 | Stop reason: HOSPADM

## 2022-01-11 RX ORDER — ROSUVASTATIN CALCIUM 5 MG/1
5 TABLET, COATED ORAL DAILY
Status: DISCONTINUED | OUTPATIENT
Start: 2022-01-11 | End: 2022-01-13 | Stop reason: HOSPADM

## 2022-01-11 RX ORDER — LANOLIN ALCOHOL/MO/W.PET/CERES
6 CREAM (GRAM) TOPICAL
Status: DISCONTINUED | OUTPATIENT
Start: 2022-01-11 | End: 2022-01-13 | Stop reason: HOSPADM

## 2022-01-11 RX ORDER — MYCOPHENOLATE MOFETIL 250 MG/1
750 CAPSULE ORAL EVERY 12 HOURS
Status: DISCONTINUED | OUTPATIENT
Start: 2022-01-11 | End: 2022-01-13 | Stop reason: HOSPADM

## 2022-01-11 RX ORDER — LISINOPRIL 5 MG/1
5 TABLET ORAL DAILY
Status: DISCONTINUED | OUTPATIENT
Start: 2022-01-11 | End: 2022-01-13 | Stop reason: HOSPADM

## 2022-01-11 RX ORDER — ONDANSETRON 2 MG/ML
4 INJECTION INTRAMUSCULAR; INTRAVENOUS EVERY 6 HOURS PRN
Status: DISCONTINUED | OUTPATIENT
Start: 2022-01-11 | End: 2022-01-13 | Stop reason: HOSPADM

## 2022-01-11 RX ORDER — HEPARIN SODIUM 5000 [USP'U]/.5ML
5000 INJECTION, SOLUTION INTRAVENOUS; SUBCUTANEOUS EVERY 12 HOURS
Status: DISCONTINUED | OUTPATIENT
Start: 2022-01-11 | End: 2022-01-13 | Stop reason: HOSPADM

## 2022-01-11 RX ORDER — DEXTROSE MONOHYDRATE 25 G/50ML
25-50 INJECTION, SOLUTION INTRAVENOUS
Status: DISCONTINUED | OUTPATIENT
Start: 2022-01-11 | End: 2022-01-13 | Stop reason: HOSPADM

## 2022-01-11 RX ORDER — SODIUM CHLORIDE 9 MG/ML
INJECTION, SOLUTION INTRAVENOUS
Status: DISCONTINUED
Start: 2022-01-11 | End: 2022-01-11 | Stop reason: HOSPADM

## 2022-01-11 RX ORDER — ACETAMINOPHEN 325 MG/1
975 TABLET ORAL EVERY 8 HOURS PRN
Status: DISCONTINUED | OUTPATIENT
Start: 2022-01-11 | End: 2022-01-13 | Stop reason: HOSPADM

## 2022-01-11 RX ORDER — ARIPIPRAZOLE 5 MG/1
5 TABLET ORAL AT BEDTIME
Status: DISCONTINUED | OUTPATIENT
Start: 2022-01-11 | End: 2022-01-13 | Stop reason: HOSPADM

## 2022-01-11 RX ADMIN — HUMAN ALBUMIN MICROSPHERES AND PERFLUTREN 6 ML: 10; .22 INJECTION, SOLUTION INTRAVENOUS at 09:11

## 2022-01-11 RX ADMIN — MYCOPHENOLATE MOFETIL 750 MG: 250 CAPSULE ORAL at 08:59

## 2022-01-11 RX ADMIN — PREDNISONE 5 MG: 5 TABLET ORAL at 09:00

## 2022-01-11 RX ADMIN — INSULIN DEGLUDEC INJECTION 26 UNITS: 100 INJECTION, SOLUTION SUBCUTANEOUS at 09:00

## 2022-01-11 RX ADMIN — LISINOPRIL 5 MG: 5 TABLET ORAL at 09:00

## 2022-01-11 RX ADMIN — ARIPIPRAZOLE 5 MG: 5 TABLET ORAL at 09:00

## 2022-01-11 RX ADMIN — LAMIVUDINE 100 MG: 100 TABLET, FILM COATED ORAL at 09:00

## 2022-01-11 RX ADMIN — METHOCARBAMOL 750 MG: 750 TABLET ORAL at 20:59

## 2022-01-11 RX ADMIN — MYCOPHENOLATE MOFETIL 750 MG: 250 CAPSULE ORAL at 19:55

## 2022-01-11 RX ADMIN — ROSUVASTATIN CALCIUM 5 MG: 5 TABLET, FILM COATED ORAL at 09:00

## 2022-01-11 RX ADMIN — ARIPIPRAZOLE 5 MG: 5 TABLET ORAL at 22:08

## 2022-01-11 RX ADMIN — Medication 12.5 MG: at 09:00

## 2022-01-11 ASSESSMENT — ACTIVITIES OF DAILY LIVING (ADL)
ADLS_ACUITY_SCORE: 9
DOING_ERRANDS_INDEPENDENTLY_DIFFICULTY: NO
ADLS_ACUITY_SCORE: 9
ADLS_ACUITY_SCORE: 4
ADLS_ACUITY_SCORE: 9
ADLS_ACUITY_SCORE: 9
DIFFICULTY_EATING/SWALLOWING: NO
ADLS_ACUITY_SCORE: 9
EQUIPMENT_CURRENTLY_USED_AT_HOME: CANE, STRAIGHT
FALL_HISTORY_WITHIN_LAST_SIX_MONTHS: NO
VISION_MANAGEMENT: GLASSES
ADLS_ACUITY_SCORE: 9
DIFFICULTY_COMMUNICATING: NO
ADLS_ACUITY_SCORE: 9
HEARING_DIFFICULTY_OR_DEAF: NO
TOILETING_ISSUES: NO
ADLS_ACUITY_SCORE: 4
CONCENTRATING,_REMEMBERING_OR_MAKING_DECISIONS_DIFFICULTY: NO
ADLS_ACUITY_SCORE: 9
PATIENT_/_FAMILY_COMMUNICATION_STYLE: SPOKEN LANGUAGE (ENGLISH OR BILINGUAL)
WEAR_GLASSES_OR_BLIND: YES
ADLS_ACUITY_SCORE: 4
ADLS_ACUITY_SCORE: 9
ADLS_ACUITY_SCORE: 9
WALKING_OR_CLIMBING_STAIRS_DIFFICULTY: NO
ADLS_ACUITY_SCORE: 9
ADLS_ACUITY_SCORE: 9
DRESSING/BATHING_DIFFICULTY: NO
ADLS_ACUITY_SCORE: 9

## 2022-01-11 ASSESSMENT — ENCOUNTER SYMPTOMS
CHILLS: 0
SHORTNESS OF BREATH: 0
ABDOMINAL PAIN: 0
FEVER: 0

## 2022-01-11 NOTE — TELEPHONE ENCOUNTER
Follow-up with anemia management service:    Miller is in the ED with Cough and dyspnea.  COVID test was Negative. Did receive 1 unit PRBCs.     Aranep was changed to weekly on starting on 1/5/22.  Due for another dose 1/12/22. Will follow up early 1/1222 to see if Miller was admitted.     Anemia Latest Ref Rng & Units 11/16/2021 11/26/2021 11/30/2021 12/22/2021 1/5/2022 1/10/2022 1/11/2022   IVELISSE Dose - 60 mcg - 60 mcg 60 mcg 100 mcg - -   Hemoglobin 13.3 - 17.7 g/dL 9.2(L) 9.1(L) 9.3(L) 8.0(L) 6.9(LL) 6.9(LL) -   TSAT 15 - 46 % 44 - - - - - -   Ferritin 26 - 388 ng/mL 400(H) - - - - - -   PRBCs - - - - - - - 1 unit          Follow-up call date: 1/12/22    Jie Blum RN   Anemia Services  [address] [address]   [email or web address]   Office : [phone]  Fax: [phone]

## 2022-01-11 NOTE — ED NOTES
Pt blood infused without issue.  Pt states no pain and or shortness of breath at this time. Pt having bedside echo at this time.

## 2022-01-11 NOTE — ED PROVIDER NOTES
ED Provider Note  [facility]      History     Chief Complaint   Patient presents with     Chest Pain     Shortness of Breath     HPI  Frandy Workman is a 56 year old male with a past medical history significant for CKD stage IIIa, type 2 diabetes, CAD, NSTEMI 7/2021 s/p stent placement,  liver cirrhosis secondary to ESTRADA s/p liver transplant (11/2019), hypertension, and hyperlipidemia who presents to the ED for an evaluation of chest pain and shortness of breath.  Patient states that he has had a cough for the last 2 weeks after traveling to [place] [facility] while climbing down stairwells and at home for [facility] unit.  He states the chest pain is accompanied by shortness of breath lasted about an hour until arrival here in the emergency department.  He denies any fevers or chills, no myalgias or arthralgias, no change in sense of taste or smell.  He reports his chest pain and shortness of breath have resolved throughout the course of his evaluation in the emergency department    Patient is fully vaccinated and boosted for COVID-19.    Past Medical History  Past Medical History:   Diagnosis Date     Anemia 2013     Arthritis      BPH (benign prostatic hyperplasia)      CAD (coronary artery disease) 4/1/2019     Cholelithiasis      Conductive hearing loss 08/16/2017     Depressive disorder 1986    Suffer effects throughout life     Gastroesophageal reflux disease 12/01/2014     HCC (hepatocellular carcinoma) (H) 1/22/2019     History of diabetic retinopathy 07/2018     HTN (hypertension)      HTN (hypertension) 11/20/2019     Hyperlipidemia      Liver cirrhosis secondary to ESTRADA (H)      Liver transplanted (H) 11/11/2019     Portal vein thrombosis 4/11/2019     Type II diabetes mellitus (H)      Past Surgical History:   Procedure Laterality Date     BYPASS GRAFT ARTERY CORONARY N/A 7/14/2021    Procedure: median sternotomy, on  cardiopulmonary bypass, CORONARY ARTERY BYPASS GRAFT (CABG) x2 with left greater saphenous vein endoscopic harvest and left internal mammery artery harvest;  Surgeon: Tom Zapata MD;  Location: [facility]     COLONOSCOPY      2015     COLONOSCOPY N/A 12/6/2019    Procedure: COLONOSCOPY, WITH POLYPECTOMY AND BIOPSY;  Surgeon: Adam Morton MD;  [facility]     CV CENTRAL VENOUS CATHETER PLACEMENT N/A 7/12/2021    Procedure: Central Venous Catheter Placement;  Surgeon: Fermin Polanco MD;  Location:  HEART CARDIAC CATH LAB     CV CORONARY ANGIOGRAM N/A 7/12/2021    Procedure: Coronary Angiogram;  Surgeon: Fermin Polanco MD;  Location:  HEART CARDIAC CATH LAB     CV HEART CATHETERIZATION WITH POSSIBLE INTERVENTION N/A 2/26/2019    Procedure: CORS;  Surgeon: Jagdish Hoyt MD;  Location:  HEART CARDIAC CATH LAB     CV INTRA AORTIC BALLOON N/A 7/12/2021    Procedure: Intra Aortic Balloon Pump Insertion;  Surgeon: Fermin Polanco MD;  Location:  HEART CARDIAC CATH LAB     ESOPHAGOSCOPY, GASTROSCOPY, DUODENOSCOPY (EGD), COMBINED N/A 11/17/2016    Procedure: COMBINED ESOPHAGOSCOPY, GASTROSCOPY, DUODENOSCOPY (EGD);  Surgeon: Santi Rosas MD;  [facility]     ESOPHAGOSCOPY, GASTROSCOPY, DUODENOSCOPY (EGD), COMBINED N/A 11/17/2017    Procedure: COMBINED ESOPHAGOSCOPY, GASTROSCOPY, DUODENOSCOPY (EGD);  EGD;  Surgeon: Santi Rosas MD;  [facility]: [facility]     ESOPHAGOSCOPY, GASTROSCOPY, DUODENOSCOPY (EGD), COMBINED N/A 12/28/2018    Procedure: EGD;  Surgeon: Santi Rosas MD;  [facility]     ESOPHAGOSCOPY, GASTROSCOPY, DUODENOSCOPY (EGD), COMBINED N/A 12/6/2019    Procedure: ESOPHAGOGASTRODUODENOSCOPY, WITH BIOPSY;  Surgeon: Adam Morton MD;  Location:  GI     ESOPHAGOSCOPY, GASTROSCOPY, DUODENOSCOPY (EGD), COMBINED N/A 2/13/2020    Procedure: ESOPHAGOGASTRODUODENOSCOPY (EGD);  Surgeon: Santi Rossa,  MD;  Location: [facility] GI     HEAD & NECK SURGERY      2017 at [facility].      IMPLANT GOLD WEIGHT EYELID Right 2017    Procedure: IMPLANT WEIGHT EYELID;  Right Upper Eyelid Weight, right tarsal strip lower eyelid;  Surgeon: Milana Malave MD;  [facility]     IR CHEMO EMBOLIZATION  2019     KNEE SURGERY Left      ORTHOPEDIC SURGERY       PAROTIDECTOMY, RADICAL NECK DISSECTION Right 2017    Procedure: PAROTIDECTOMY, RADICAL NECK DISSECTION;  Right Superfacial Parotidectomy , Facial nerve repair. with [facility] facial nerve monitor.;  Surgeon: Asiya Morgan MD;  [facility]: [facility] OR     PICC INSERTION Left 2017    4fr SL BioFlo PICC, 44cm in the L basilic vein w/ tip in the low SVC     RETURN LIVER TRANSPLANT N/A 2019    Procedure: Exploratory laparotomy, hematoma evacuation, abdominal washout;  Surgeon: Александр Ramos MD;  [facility]: [facility]     TRANSPLANT LIVER RECIPIENT  DONOR N/A 2019    Procedure: TRANSPLANT, LIVER, RECIPIENT,  DONOR;  Surgeon: Александр Ramos MD;  [facility]: [facility]     VASCULAR SURGERY       ARIPiprazole (ABILIFY) 5 MG tablet  aspirin (SM ASPIRIN ADULT LOW STRENGTH) 81 MG EC tablet  cyanocobalamin (VITAMIN B-12) 1000 MCG tablet  empagliflozin (JARDIANCE) 10 MG TABS tablet  insulin aspart (NOVOLOG PEN) 100 UNIT/ML pen  insulin degludec (TRESIBA FLEXTOUCH) 100 UNIT/ML pen  lamiVUDine (EPIVIR) 100 MG tablet  lisinopril (ZESTRIL) 5 MG tablet  methocarbamol (ROBAXIN) 750 MG tablet  metoprolol succinate ER (TOPROL-XL) 25 MG 24 hr tablet  pantoprazole (PROTONIX) 40 MG EC tablet  rosuvastatin (CRESTOR) 5 MG tablet  tamsulosin (FLOMAX) 0.4 MG capsule  vitamin D3 (CHOLECALCIFEROL) 50 mcg (2000 units) tablet  Acetaminophen (TYLENOL) 325 MG CAPS  BD VIKTORIA U/F 32G X 4 MM insulin pen needle  ciclopirox (LOPROX) 0.77 % cream  Continuous Blood Gluc  (FREESTYLE CLAUDIA 14 DAY READER) CECILIO  Continuous Blood Gluc Sensor (FREESTYLE CLAUDIA 2 SENSOR) [facility] [place]  Multiple  Vitamin (TAB-A-GLADIS) TABS  mycophenolate (GENERIC EQUIVALENT) 250 MG capsule  order for DME  oxyCODONE (ROXICODONE) 5 MG tablet  polyethylene glycol (MIRALAX) 17 g packet  predniSONE (DELTASONE) 5 MG tablet  senna-docusate (SENOKOT-S/PERICOLACE) 8.6-50 MG tablet      Allergies   Allergen Reactions     Codeine Other (See Comments)     Cannot take due to liver  Cannot tolerate oral narcotics     Seasonal Allergies      Sneezing, coughing, runny and itchy eyes     Family History  Family History   Problem Relation Age of Onset     Prostate Cancer Maternal Grandfather      Substance Abuse Maternal Grandfather         Alcohol     Colon Cancer Father 60     Pancreatic Cancer Father 60     Prostate Cancer Father      Colorectal Cancer Father      Macular Degeneration Father      Cancer Father      Glaucoma Father      Skin Cancer Father      Colorectal Cancer Maternal Grandmother      Cancer Maternal Grandmother      Substance Abuse Maternal Grandmother         Alcohol     Colorectal Cancer Paternal Grandmother      Cancer Mother      Diabetes Mother          3/2016     Cerebrovascular Disease Mother         Passed away in Feb of this year, 80 years old.     Thyroid Disease Mother      Depression Mother      Asthma Sister         Had since birth     Thyroid Disease Sister      Depression Sister      Liver Disease No family hx of      Melanoma No family hx of      Social History   Social History     Tobacco Use     Smoking status: Former Smoker     Packs/day: 6.00     Years: 30.00     Pack years: 180.00     Types: Cigars     Start date: 2016     Quit date: 10/25/2017     Years since quittin.2     Smokeless tobacco: Former User     Types: Chew     Quit date: 10/31/2017     Tobacco comment: 1 tin per week   Substance Use Topics     Alcohol use: No     Alcohol/week: 0.0 standard drinks     Comment: quit 1996     Drug use: No      Past medical history, past surgical history, medications, allergies, family  history, and social history were reviewed with the patient. No additional pertinent items.       Review of Systems   Constitutional: Negative for chills and fever.   Respiratory: Negative for shortness of breath.    Cardiovascular: Negative for chest pain.   Gastrointestinal: Negative for abdominal pain.   All other systems reviewed and are negative.    A complete review of systems was performed with pertinent positives and negatives noted in the HPI, and all other systems negative.    Physical Exam   BP: 90/55  Pulse: 115  Temp: 97.8  F (36.6  C)  Resp: 18  Weight: 71.2 kg (157 lb)  SpO2: 100 %  Physical Exam  Vitals and nursing note reviewed.   Constitutional:       General: He is not in acute distress.     Appearance: He is well-developed. He is not diaphoretic.   HENT:      Head: Atraumatic.   Eyes:      General: No scleral icterus.     Pupils: Pupils are equal, round, and reactive to light.   Cardiovascular:      Rate and Rhythm: Normal rate and regular rhythm.      Heart sounds: Normal heart sounds.   Pulmonary:      Effort: No respiratory distress.      Breath sounds: Normal breath sounds.   Abdominal:      General: Bowel sounds are normal.      Palpations: Abdomen is soft.      Tenderness: There is no abdominal tenderness.   Musculoskeletal:         General: No tenderness.   Skin:     General: Skin is warm.      Findings: No rash.   Neurological:      Mental Status: He is alert.           ED Course      Procedures            EKG Interpretation:      Interpreted by Fadumo Leonard MD  Time reviewed: perEpic  Symptoms at time of EKG: none   Rhythm: normal sinus   Rate: normal  Axis: normal  Ectopy: none  Conduction: normal  ST Segments/ T Waves: No ST-T wave changes  Q Waves: none  Comparison to prior: Unchanged    Clinical Impression: normal EKG                    Results for orders placed or performed during the hospital encounter of 01/10/22   [place] Draw     Status: None ()    Narrative    The following  orders were created for panel order [place] Draw.  Procedure                               Abnormality         Status                     ---------                               -----------         ------                     Extra Blue Top Tube[[id number]]                                                         Extra Red Top Tube[[id number]]                                                          Extra Green Top (Lithium...[[id number]]                                                 Extra Purple Top Tube[[id number]]                                                         Please view results for these tests on the individual orders.     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  Patient placed on cardiac monitor which revealed blood pressure of 90/55 which is consistent with his baseline low blood pressures.  Heart rate initially 115 with spontaneous improvement to the high 90s low 100s.  Patient respirations normal 18, pulse ox normal 100% on room air.  Patient afebrile at 97.8  F.    IV established, labs drawn sent reviewed document in epic remarkable for hemoglobin 6.9, mild leukopenia with a white count of 3.9 but otherwise normal CBC, electrolytes remarkable for BUN elevated from baseline at 78, creatinine of 2.21 which is within his normal range hemolyzed potassium 5.8 otherwise unremarkable electrolytes.  Lactic acid normal at 0.7.  COVID testing and influenza swabs normal.  UA bland.  X-ray chest was obtained and interpreted by me which revealed no acute process.  Patient was consented for blood product transfusion and this was initiated here in the  emergency department.  Case discussed with medicine hospital service with agreement for admission to inpatient status and plan for complete echo in the morning..    I have reviewed the nursing notes. I have reviewed the findings, diagnosis, plan and need for follow up with the patient.    New Prescriptions    No medications on file       Final diagnoses:   Chest pain, unspecified type       --  Fadumo Leonard MD    [facility] EMERGENCY DEPARTMENT  1/10/2022     Fadumo Leonard MD  01/25/22 [id number]

## 2022-01-11 NOTE — ED TRIAGE NOTES
Pt reports having cough for last 2 weeks. Pt reports developing SOB and worsening chest pain today. Pt report baseline chest pain midsternal 5/10 but today 8/10. Pt reports stent placement in July and fully vax and boostered. Pt alert/oriented in triage, respirations non labored.   Pt reports BP baseline hypotensive and reports getting a shot for this weekly.

## 2022-01-12 ENCOUNTER — APPOINTMENT (OUTPATIENT)
Dept: ULTRASOUND IMAGING | Facility: CLINIC | Age: 57
DRG: 683 | End: 2022-01-12
Attending: PHYSICIAN ASSISTANT
Payer: MEDICARE

## 2022-01-12 ENCOUNTER — APPOINTMENT (OUTPATIENT)
Dept: GENERAL RADIOLOGY | Facility: CLINIC | Age: 57
DRG: 683 | End: 2022-01-12
Attending: PHYSICIAN ASSISTANT
Payer: MEDICARE

## 2022-01-12 LAB
ALBUMIN SERPL-MCNC: 3.1 G/DL (ref 3.4–5)
ALP SERPL-CCNC: 98 U/L (ref 40–150)
ALT SERPL W P-5'-P-CCNC: 14 U/L (ref 0–70)
ANION GAP SERPL CALCULATED.3IONS-SCNC: 10 MMOL/L (ref 3–14)
AST SERPL W P-5'-P-CCNC: 8 U/L (ref 0–45)
ATRIAL RATE - MUSE: 113 BPM
BASOPHILS # BLD AUTO: 0 10E3/UL (ref 0–0.2)
BASOPHILS NFR BLD AUTO: 1 %
BILIRUB SERPL-MCNC: 0.5 MG/DL (ref 0.2–1.3)
BUN SERPL-MCNC: 60 MG/DL (ref 7–30)
C PNEUM DNA SPEC QL NAA+PROBE: NOT DETECTED
CALCIUM SERPL-MCNC: 9.5 MG/DL (ref 8.5–10.1)
CHLORIDE BLD-SCNC: 112 MMOL/L (ref 94–109)
CO2 SERPL-SCNC: 18 MMOL/L (ref 20–32)
CREAT SERPL-MCNC: 1.72 MG/DL (ref 0.66–1.25)
DIASTOLIC BLOOD PRESSURE - MUSE: NORMAL MMHG
EOSINOPHIL # BLD AUTO: 0.2 10E3/UL (ref 0–0.7)
EOSINOPHIL NFR BLD AUTO: 4 %
ERYTHROCYTE [DISTWIDTH] IN BLOOD BY AUTOMATED COUNT: 16.8 % (ref 10–15)
FLUAV H1 2009 PAND RNA SPEC QL NAA+PROBE: NOT DETECTED
FLUAV H1 RNA SPEC QL NAA+PROBE: NOT DETECTED
FLUAV H3 RNA SPEC QL NAA+PROBE: NOT DETECTED
FLUAV RNA SPEC QL NAA+PROBE: NOT DETECTED
FLUBV RNA SPEC QL NAA+PROBE: NOT DETECTED
GFR SERPL CREATININE-BSD FRML MDRD: 46 ML/MIN/1.73M2
GLUCOSE BLD-MCNC: 122 MG/DL (ref 70–99)
GLUCOSE BLDC GLUCOMTR-MCNC: 128 MG/DL (ref 70–99)
GLUCOSE BLDC GLUCOMTR-MCNC: 153 MG/DL (ref 70–99)
GLUCOSE BLDC GLUCOMTR-MCNC: 157 MG/DL (ref 70–99)
GLUCOSE BLDC GLUCOMTR-MCNC: 170 MG/DL (ref 70–99)
HADV DNA SPEC QL NAA+PROBE: NOT DETECTED
HCOV PNL SPEC NAA+PROBE: NOT DETECTED
HCT VFR BLD AUTO: 25.4 % (ref 40–53)
HGB BLD-MCNC: 7.9 G/DL (ref 13.3–17.7)
HMPV RNA SPEC QL NAA+PROBE: NOT DETECTED
HOLD SPECIMEN: NORMAL
HPIV1 RNA SPEC QL NAA+PROBE: NOT DETECTED
HPIV2 RNA SPEC QL NAA+PROBE: NOT DETECTED
HPIV3 RNA SPEC QL NAA+PROBE: NOT DETECTED
HPIV4 RNA SPEC QL NAA+PROBE: NOT DETECTED
IMM GRANULOCYTES # BLD: 0 10E3/UL
IMM GRANULOCYTES NFR BLD: 1 %
INTERPRETATION ECG - MUSE: NORMAL
LYMPHOCYTES # BLD AUTO: 0.7 10E3/UL (ref 0.8–5.3)
LYMPHOCYTES NFR BLD AUTO: 21 %
M PNEUMO DNA SPEC QL NAA+PROBE: NOT DETECTED
MCH RBC QN AUTO: 30.4 PG (ref 26.5–33)
MCHC RBC AUTO-ENTMCNC: 31.1 G/DL (ref 31.5–36.5)
MCV RBC AUTO: 98 FL (ref 78–100)
MONOCYTES # BLD AUTO: 0.3 10E3/UL (ref 0–1.3)
MONOCYTES NFR BLD AUTO: 8 %
MYCOPHENOLATE SERPL LC/MS/MS-MCNC: 6.89 MG/L (ref 1–3.5)
MYCOPHENOLATE-G SERPL LC/MS/MS-MCNC: 126.9 MG/L (ref 30–95)
NEUTROPHILS # BLD AUTO: 2.3 10E3/UL (ref 1.6–8.3)
NEUTROPHILS NFR BLD AUTO: 65 %
NRBC # BLD AUTO: 0 10E3/UL
NRBC BLD AUTO-RTO: 0 /100
P AXIS - MUSE: -12 DEGREES
PLATELET # BLD AUTO: 149 10E3/UL (ref 150–450)
POTASSIUM BLD-SCNC: 4.6 MMOL/L (ref 3.4–5.3)
PR INTERVAL - MUSE: 116 MS
PROT SERPL-MCNC: 7.1 G/DL (ref 6.8–8.8)
QRS DURATION - MUSE: 86 MS
QT - MUSE: 310 MS
QTC - MUSE: 425 MS
R AXIS - MUSE: 34 DEGREES
RBC # BLD AUTO: 2.6 10E6/UL (ref 4.4–5.9)
RETICS # AUTO: 0.03 10E6/UL (ref 0.03–0.1)
RETICS/RBC NFR AUTO: 1.1 % (ref 0.5–2)
RSV RNA SPEC QL NAA+PROBE: NOT DETECTED
RSV RNA SPEC QL NAA+PROBE: NOT DETECTED
RV+EV RNA SPEC QL NAA+PROBE: NOT DETECTED
SODIUM SERPL-SCNC: 140 MMOL/L (ref 133–144)
SYSTOLIC BLOOD PRESSURE - MUSE: NORMAL MMHG
T AXIS - MUSE: 46 DEGREES
TME LAST DOSE: ABNORMAL H
TME LAST DOSE: ABNORMAL H
VENTRICULAR RATE- MUSE: 113 BPM
VIT B12 SERPL-MCNC: 2179 PG/ML (ref 193–986)
WBC # BLD AUTO: 3.5 10E3/UL (ref 4–11)

## 2022-01-12 PROCEDURE — 85025 COMPLETE CBC W/AUTO DIFF WBC: CPT | Performed by: PHYSICIAN ASSISTANT

## 2022-01-12 PROCEDURE — 250N000012 HC RX MED GY IP 250 OP 636 PS 637: Performed by: PHYSICIAN ASSISTANT

## 2022-01-12 PROCEDURE — 250N000013 HC RX MED GY IP 250 OP 250 PS 637: Performed by: PHYSICIAN ASSISTANT

## 2022-01-12 PROCEDURE — 93975 VASCULAR STUDY: CPT

## 2022-01-12 PROCEDURE — 120N000002 HC R&B MED SURG/OB UMMC

## 2022-01-12 PROCEDURE — 36415 COLL VENOUS BLD VENIPUNCTURE: CPT | Performed by: PHYSICIAN ASSISTANT

## 2022-01-12 PROCEDURE — 87449 NOS EACH ORGANISM AG IA: CPT | Performed by: STUDENT IN AN ORGANIZED HEALTH CARE EDUCATION/TRAINING PROGRAM

## 2022-01-12 PROCEDURE — 82040 ASSAY OF SERUM ALBUMIN: CPT | Performed by: PHYSICIAN ASSISTANT

## 2022-01-12 PROCEDURE — 71045 X-RAY EXAM CHEST 1 VIEW: CPT | Mod: 26 | Performed by: RADIOLOGY

## 2022-01-12 PROCEDURE — 71045 X-RAY EXAM CHEST 1 VIEW: CPT

## 2022-01-12 PROCEDURE — 87077 CULTURE AEROBIC IDENTIFY: CPT | Performed by: STUDENT IN AN ORGANIZED HEALTH CARE EDUCATION/TRAINING PROGRAM

## 2022-01-12 PROCEDURE — 93975 VASCULAR STUDY: CPT | Mod: 26 | Performed by: RADIOLOGY

## 2022-01-12 PROCEDURE — 80053 COMPREHEN METABOLIC PANEL: CPT | Performed by: PHYSICIAN ASSISTANT

## 2022-01-12 PROCEDURE — P9016 RBC LEUKOCYTES REDUCED: HCPCS | Performed by: PHYSICIAN ASSISTANT

## 2022-01-12 PROCEDURE — 36415 COLL VENOUS BLD VENIPUNCTURE: CPT | Performed by: STUDENT IN AN ORGANIZED HEALTH CARE EDUCATION/TRAINING PROGRAM

## 2022-01-12 PROCEDURE — 85045 AUTOMATED RETICULOCYTE COUNT: CPT | Performed by: PHYSICIAN ASSISTANT

## 2022-01-12 PROCEDURE — 258N000003 HC RX IP 258 OP 636: Performed by: PHYSICIAN ASSISTANT

## 2022-01-12 PROCEDURE — 99233 SBSQ HOSP IP/OBS HIGH 50: CPT | Performed by: STUDENT IN AN ORGANIZED HEALTH CARE EDUCATION/TRAINING PROGRAM

## 2022-01-12 PROCEDURE — 87205 SMEAR GRAM STAIN: CPT | Performed by: STUDENT IN AN ORGANIZED HEALTH CARE EDUCATION/TRAINING PROGRAM

## 2022-01-12 PROCEDURE — 999N000128 HC STATISTIC PERIPHERAL IV START W/O US GUIDANCE

## 2022-01-12 PROCEDURE — 80180 DRUG SCRN QUAN MYCOPHENOLATE: CPT | Performed by: PHYSICIAN ASSISTANT

## 2022-01-12 PROCEDURE — 99222 1ST HOSP IP/OBS MODERATE 55: CPT | Mod: GC | Performed by: INTERNAL MEDICINE

## 2022-01-12 PROCEDURE — 250N000011 HC RX IP 250 OP 636: Performed by: PHYSICIAN ASSISTANT

## 2022-01-12 RX ORDER — SODIUM CHLORIDE, SODIUM LACTATE, POTASSIUM CHLORIDE, CALCIUM CHLORIDE 600; 310; 30; 20 MG/100ML; MG/100ML; MG/100ML; MG/100ML
INJECTION, SOLUTION INTRAVENOUS CONTINUOUS
Status: ACTIVE | OUTPATIENT
Start: 2022-01-12 | End: 2022-01-13

## 2022-01-12 RX ADMIN — Medication 12.5 MG: at 09:48

## 2022-01-12 RX ADMIN — MYCOPHENOLATE MOFETIL 750 MG: 250 CAPSULE ORAL at 09:49

## 2022-01-12 RX ADMIN — Medication 1000 MCG: at 09:58

## 2022-01-12 RX ADMIN — ARIPIPRAZOLE 5 MG: 5 TABLET ORAL at 21:23

## 2022-01-12 RX ADMIN — MYCOPHENOLATE MOFETIL 750 MG: 250 CAPSULE ORAL at 21:23

## 2022-01-12 RX ADMIN — HEPARIN SODIUM 5000 UNITS: 10000 INJECTION, SOLUTION INTRAVENOUS; SUBCUTANEOUS at 13:31

## 2022-01-12 RX ADMIN — HEPARIN SODIUM 5000 UNITS: 10000 INJECTION, SOLUTION INTRAVENOUS; SUBCUTANEOUS at 01:30

## 2022-01-12 RX ADMIN — ASPIRIN 162 MG: 81 TABLET, COATED ORAL at 10:00

## 2022-01-12 RX ADMIN — SODIUM CHLORIDE, POTASSIUM CHLORIDE, SODIUM LACTATE AND CALCIUM CHLORIDE: 600; 310; 30; 20 INJECTION, SOLUTION INTRAVENOUS at 16:42

## 2022-01-12 RX ADMIN — Medication 50 MCG: at 09:58

## 2022-01-12 RX ADMIN — LAMIVUDINE 100 MG: 100 TABLET, FILM COATED ORAL at 09:48

## 2022-01-12 RX ADMIN — THERA TABS 1 TABLET: TAB at 09:58

## 2022-01-12 RX ADMIN — PANTOPRAZOLE SODIUM 40 MG: 40 TABLET, DELAYED RELEASE ORAL at 09:58

## 2022-01-12 RX ADMIN — SODIUM CHLORIDE, POTASSIUM CHLORIDE, SODIUM LACTATE AND CALCIUM CHLORIDE: 600; 310; 30; 20 INJECTION, SOLUTION INTRAVENOUS at 03:59

## 2022-01-12 RX ADMIN — TAMSULOSIN HYDROCHLORIDE 0.4 MG: 0.4 CAPSULE ORAL at 09:58

## 2022-01-12 RX ADMIN — INSULIN DEGLUDEC INJECTION 26 UNITS: 100 INJECTION, SOLUTION SUBCUTANEOUS at 09:58

## 2022-01-12 RX ADMIN — PREDNISONE 5 MG: 5 TABLET ORAL at 09:58

## 2022-01-12 ASSESSMENT — ACTIVITIES OF DAILY LIVING (ADL)
ADLS_ACUITY_SCORE: 4
ADLS_ACUITY_SCORE: 4
ADLS_ACUITY_SCORE: 6
ADLS_ACUITY_SCORE: 4
ADLS_ACUITY_SCORE: 4
ADLS_ACUITY_SCORE: 6
ADLS_ACUITY_SCORE: 4
DEPENDENT_IADLS:: INDEPENDENT
ADLS_ACUITY_SCORE: 6
ADLS_ACUITY_SCORE: 4
ADLS_ACUITY_SCORE: 6
ADLS_ACUITY_SCORE: 6
ADLS_ACUITY_SCORE: 4
ADLS_ACUITY_SCORE: 6
ADLS_ACUITY_SCORE: 4
ADLS_ACUITY_SCORE: 6
ADLS_ACUITY_SCORE: 4
ADLS_ACUITY_SCORE: 4

## 2022-01-12 NOTE — TELEPHONE ENCOUNTER
Miller was admitted. Received another unit of PRBCs    Jie Blum RN   [facility]  [address] [address]   [email or web address]   Office : [phone]  Fax: [phone]

## 2022-01-12 NOTE — H&P
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     # Cough, ROONEY and pleuritic chest pain - Acute onset x2 days. No associated fevers, and patient afebrile. Patient reports an exertional component of CP, but EKG reassuring and troponin negative x2, and with recent CABG 6 months ago unlikely ACS or unstable angina. CXR clear, but immunocompromised and appears dry. Mild leukopenia as noted below. COVID 19 swab negative. Possible underlying viral/bacterial/fungal PNA present. Patient has hx of PVT in setting of HCC, recent AFP < 1.5. ?PE with mild tachycardia and travel hx, but no hypoxia.  - RVP, procalcitonin, CRP, repeat CXR in AM after fluids  - If symptoms persist and still tachycardic, will need to rule out PE  - Anti-tussive PRN     # Acute on chronic anemia, macrocytic  - Patient with anemia since liver tx, possibly thought to be due to Imuran, which has since been discontinued. Iron studies 11/2021, with normal iron, elevated ferritin, and low TIBC. No recent B12. Now thought to be due to anemia of chronic disease from CKD, receiving IVELISSE every 2 weeks. Hgb 6.9 on admission, but also noted to be 6.9 on 1/5/22 on infusion clinic visit. Denies any blood loss, melena, or hematochezia. S/p 1 unit of pRBC with minimal improvement to 7.0.   - Trend CBC and Reticulocyte count, B12   - Transfuse for hgb >7.0, giving an additional 1 unit of pRBC now  - If does not respond to transfusion will work up for hemolysis (normal T bili), or  consumptive process     # JERONIMO on CKD stage III - baseline Cr 1.6-1.8 prior to admission, though over the last week, Cr peaked at 2.41, improved to 2.0 after receiving 1 unit of pRBC. Suspect pre-renal azotemia with elevated BUN 60s-70s. Denies any urinary symptoms. UA with + granular casts, possibly ATN?.   - Abdominal US, to assess for hydronephrosis, PVR  - Strict I&Os, daily weights   - Receiving transfusion, will give gentle fluids following   - Trend BMP  - Hold Lisinopril     # Leukopenia, mild - Possibly 2/2 underlying infection vs medication induced (possibly MMF)  - Trend CBD with diff, infectious work up as above. If febrile, pan culture.   - MMF level in AM, would discuss with hepatology if elevated     # Hx of HCC s/p TACE and microwave ablation 1/2019  # ESTRADA cirrhosis c/b PVT/SMA thrombosis   # s/p Liver transplant 11/2019  Follows with Dr. Gaviria, with recent screening negative for recurrent HCC. Patient endorsing new RUQ abdominal pain over the last several days. Prior US liver with surgically absent gallbladder with patent vasculature on 12/2019.   - Continue IS:  mg BID, and prednisone 5 mg daily   - Lamivudine for Hep B core + donor ppx   - Abdominal US with duplex     # CAD s/p 2 vessel CABG to LAD and OM  # HTN   # HLD   Follows with cardiology at [facility] following recent CABG. After CABG, patient with ischemic cardiomyopathy at with EF at 45 % with WMA. On presentation patient reporting atypical chest pain. EKG without any acute ischemic changes. TTE on 1/11/22 with improvement in EF to 55-60%, and improvement of WMA. Troponin negative x2.   - Continue PTA  mg daily, rosuvastatin 5 mg daily, and metoprolol XL 12.5 mg daily   - Hold PTA Lisinopril 5 mg daily with JERONIMO     # IDDM2 - Hemoglobin A1c 6.0 on 1/10/22. Uses continuous glucose monitor. Follows with endocrine. Reports elevated BGs over the last several days in 200s.   - Continue PTA Tresiba 26 Units daily  - NPO after midnight  for abdominal US. Once diet advanced resume PTA Novolog 1:20 gram CHO TID AC and with snacks   - Medium sliding scale Novolog TIDAC  - Hold PTA Empagliflozin 10 mg daily      # Depression, Bipolar disorder - Continue PTA Abilify 5 mg daily        Diet:  Regular diet, NPO after midnight   DVT Prophylaxis: Heparin SQ  Carroll Catheter: Not present  Central Lines: None  Code Status:   FULL CODE     Clinically Significant Risk Factors Present on Admission        # Hyperkalemia: K = 5.8 mmol/L (Ref range: 3.4 - 5.3 mmol/L) on admission, will monitor as appropriate       # Platelet Defect: home medication list includes an antiplatelet medication       Disposition Plan   Expected Discharge: TBD  Anticipated discharge location: home with family    Delays:         The patient's care was discussed with the Attending Physician, Dr. Nahun Rosas , Bedside Nurse and Patient.    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     Patient report having un-relenting cough for the last 2 days, associate with ROONEY, racing palpitations, and chest pain, worse with exertion, but occasionally happening at rest. Substernal and  L sided CP reported as sharp, and pleuritic. Non-radiating. States it was  similar to when he had his heart attack 7/2021. Endorses sore throat and runny nose.  Denies any F/C, or hemoptysis. Denies any hx of DVT. Denies any sick contacts. States he recently traveled to [place] to see family over [place]. Denies having any pets/animals at home.     Denies any bleeding, black or bloody stools, or hematuria. States he has chronic anemia since his liver surgery, receiving aranesp every 2 weeks, but with traveling for the holiday he didn't get it for 3 weeks. Per patient, nephrology was discussing to switching to every week. Patient denies any dysuria, hematuria, difficulty emptying bladder, or decrease urine output. Denies any rashes or easy bruising or bleeding.     Endorses diarrhea since Sunday, 1-2 episodes of light brown stool. Also noted to have abdominal pain in RUQ, worse after eating especially acidic foods, that started 2 days ago. Denies any N/V. Reports decreased PO intake. Reports taking all of his medications, including his immunosuppressive medications. Patient has noted high blood sugars over the last 4-5 days, in the 200s despite using his PTA insulin.     Of note, patient endorsed 10 lb unintentional weight loss in the last 2 weeks. Denies any night sweats.     Review of Systems    The 10 point Review of Systems is negative other than noted in the HPI or here.     Past Medical History    I have reviewed this patient's medical history and updated it with pertinent information if needed.   Past Medical History:   Diagnosis Date     Anemia 2013     Arthritis      BPH (benign prostatic hyperplasia)      CAD (coronary artery disease) 4/1/2019     Cholelithiasis      Conductive hearing loss 08/16/2017     Depressive disorder 1986    Suffer effects throughout life     Gastroesophageal reflux disease 12/01/2014     HCC (hepatocellular carcinoma) (H) 1/22/2019     History of diabetic retinopathy 07/2018     HTN (hypertension)      HTN (hypertension) 11/20/2019     Hyperlipidemia       Liver cirrhosis secondary to ESTRADA (H)      Liver transplanted (H) 11/11/2019     Portal vein thrombosis 4/11/2019     Type II diabetes mellitus (H)        Past Surgical History   I have reviewed this patient's surgical history and updated it with pertinent information if needed.  Past Surgical History:   Procedure Laterality Date     BYPASS GRAFT ARTERY CORONARY N/A 7/14/2021    Procedure: median sternotomy, on cardiopulmonary bypass, CORONARY ARTERY BYPASS GRAFT (CABG) x2 with left greater saphenous vein endoscopic harvest and left internal mammery artery harvest;  Surgeon: Tom Zapata MD;  Location: [facility] OR     COLONOSCOPY      2015     COLONOSCOPY N/A 12/6/2019    Procedure: COLONOSCOPY, WITH POLYPECTOMY AND BIOPSY;  Surgeon: Adam Morton MD;  Location: [facility] GI     CV CENTRAL VENOUS CATHETER PLACEMENT N/A 7/12/2021    Procedure: Central Venous Catheter Placement;  Surgeon: Fermin Polanco MD;  Location:  [facility]     CV CORONARY ANGIOGRAM N/A 7/12/2021    Procedure: Coronary Angiogram;  Surgeon: Fermin Polanco MD;  Location:  [facility] C[facility]     CV HEART CATHETERIZATION WITH POSSIBLE INTERVENTION N/A 2/26/2019    Procedure: CORS;  Surgeon: Jagdish Hoyt MD;  Location:  HE[facility]ARD[facility] CATH LAB     CV INTRA AORTIC BALLOON N/A 7/12/2021    Procedure: Intra Aortic Balloon Pump Insertion;  Surgeon: Fermin Polanco MD;  Location:  HEART CARDIAC CATH LAB     ESOPHAGOSCOPY, GASTROSCOPY, DUODENOSCOPY (EGD), COMBINED N/A 11/17/2016    Procedure: COMBINED ESOPHAGOSCOPY, GASTROSCOPY, DUODENOSCOPY (EGD);  Surgeon: Santi Rosas MD;  Location:  GI     ESOPHAGOSCOPY, GASTROSCOPY, DUODENOSCOPY (EGD), COMBINED N/A 11/17/2017    Procedure: COMBINED ESOPHAGOSCOPY, GASTROSCOPY, DUODENOSCOPY (EGD);  EGD;  Surgeon: Santi Rosas MD;  Location:  GI     ESOPHAGOSCOPY, GASTROSCOPY, DUODENOSCOPY (EGD), COMBINED N/A  2018    Procedure: EGD;  Surgeon: Santi Rosas MD;  Location:  OR     ESOPHAGOSCOPY, GASTROSCOPY, DUODENOSCOPY (EGD), COMBINED N/A 2019    Procedure: ESOPHAGOGASTRODUODENOSCOPY, WITH BIOPSY;  Surgeon: Adam Morton MD;  Location:  GI     ESOPHAGOSCOPY, GASTROSCOPY, DUODENOSCOPY (EGD), COMBINED N/A 2020    Procedure: ESOPHAGOGASTRODUODENOSCOPY (EGD);  Surgeon: Santi Rosas MD;  Location:  GI     HEAD & NECK SURGERY      2017 at [facility].      IMPLANT GOLD WEIGHT EYELID Right 2017    Procedure: IMPLANT WEIGHT EYELID;  Right Upper Eyelid Weight, right tarsal strip lower eyelid;  Surgeon: Milana Malave MD;  Location:  OR     IR CHEMO EMBOLIZATION  2019     KNEE SURGERY Left      ORTHOPEDIC SURGERY       PAROTIDECTOMY, RADICAL NECK DISSECTION Right 2017    Procedure: PAROTIDECTOMY, RADICAL NECK DISSECTION;  Right Superfacial Parotidectomy , Facial nerve repair. with [facility] facial nerve monitor.;  Surgeon: Asiya Morgan MD;  Location: [facility] OR     PICC INSERTION Left 2017    4fr SL BioFlo PICC, 44cm in the L basilic vein w/ tip in the low SVC     RETURN LIVER TRANSPLANT N/A 2019    Procedure: Exploratory laparotomy, hematoma evacuation, abdominal washout;  Surgeon: Александр Ramos MD;  [facility]     TRANSPLANT LIVER RECIPIENT  DONOR N/A 2019    Procedure: TRANSPLANT, LIVER, RECIPIENT,  DONOR;  Surgeon: Александр Ramos MD;  Location: [facility] OR     VASCULAR SURGERY         Social History   I have reviewed this patient's social history and updated it with pertinent information if needed.  Social History     Tobacco Use     Smoking status: Former Smoker     Packs/day: 6.00     Years: 30.00     Pack years: 180.00     Types: Cigars     Start date: 2016     Quit date: 10/25/2017     Years since quittin.2     Smokeless tobacco: Former User     Types: Chew     Quit date: 10/31/2017     Tobacco comment: 1 tin per  week   Substance Use Topics     Alcohol use: No     Alcohol/week: 0.0 standard drinks     Comment: quit 1996     Drug use: No       Family History   I have reviewed this patient's family history and updated it with pertinent information if needed.  Family History   Problem Relation Age of Onset     Prostate Cancer Maternal Grandfather      Substance Abuse Maternal Grandfather         Alcohol     Colon Cancer Father 60     Pancreatic Cancer Father 60     Prostate Cancer Father      Colorectal Cancer Father      Macular Degeneration Father      Cancer Father      Glaucoma Father      Skin Cancer Father      Colorectal Cancer Maternal Grandmother      Cancer Maternal Grandmother      Substance Abuse Maternal Grandmother         Alcohol     Colorectal Cancer Paternal Grandmother      Cancer Mother      Diabetes Mother          3/2016     Cerebrovascular Disease Mother         Passed away in Feb of this year, 80 years old.     Thyroid Disease Mother      Depression Mother      Asthma Sister         Had since birth     Thyroid Disease Sister      Depression Sister      Liver Disease No family hx of      Melanoma No family hx of        Prior to Admission Medications   Prior to Admission Medications   Prescriptions Last Dose Informant Patient Reported? Taking?   ARIPiprazole (ABILIFY) 5 MG tablet 1/10/2022 at Unknown time  Yes Yes   Sig: daily   Acetaminophen (TYLENOL) 325 MG CAPS   No No   Sig: Take 325-650 mg by mouth every 4 hours as needed (pain, fever)   BD VIKTORIA U/F 32G X 4 MM insulin pen needle   No No   Sig: Use 5 per day   Continuous Blood Gluc  (FREESTYLE CLAUDIA 14 DAY READER) CECILIO   No No   Sig: Use to read blood sugars as per 's instructions.   Continuous Blood Gluc Sensor (FREESTYLE CLAUDIA 2 SENSOR) [facility]   No No   Si each See Admin Instructions Change every 14 days.   Multiple Vitamin (TAB-A-GLADIS) TABS   No No   Sig: Take 1 tablet by mouth daily   aspirin (SM ASPIRIN ADULT LOW  STRENGTH) 81 MG EC tablet 1/10/2022 at Unknown time  No Yes   Sig: Take 2 tablets (162 mg) by mouth daily   ciclopirox (LOPROX) 0.77 % cream   No No   Sig: Apply topically 2 times daily To feet and toenails.   cyanocobalamin (VITAMIN B-12) 1000 MCG tablet 1/10/2022 at Unknown time  No Yes   Sig: TAKE 1 TABLET (1,000 MCG) BY MOUTH DAILY   empagliflozin (JARDIANCE) 10 MG TABS tablet 1/10/2022 at Unknown time  No Yes   Sig: Take 1 tablet (10 mg) by mouth daily   insulin aspart (NOVOLOG PEN) 100 UNIT/ML pen 1/10/2022 at Unknown time  No Yes   Sig: Inject 1-5 Units Subcutaneous At Bedtime MEDIUM INSULIN RESISTANCE DOSING  Do Not give Bedtime Correction Insulin if BG less than  200. For  - 249 give 1 units. For  - 299 give 2 units. For  - 349 give 3 units. For  -399 give 4 units. For BG greater than or equal to 400 give 5 units. Notify provider if glucose greater than or equal to 350 mg/dL after administration of correction dose. If given at mealtime, administer within 30 minutes of start of meal. Approx daily dose 5 units.   insulin degludec (TRESIBA FLEXTOUCH) 100 UNIT/ML pen 1/10/2022 at Unknown time  No Yes   Sig: Inject 26 units subcutaneous once daily   lamiVUDine (EPIVIR) 100 MG tablet 1/10/2022 at Unknown time  No Yes   Sig: Take 1 tablet (100 mg) by mouth daily   lisinopril (ZESTRIL) 5 MG tablet 1/10/2022 at Unknown time  No Yes   Sig: Take 1 tablet (5 mg) by mouth daily   methocarbamol (ROBAXIN) 750 MG tablet 1/10/2022 at Unknown time  No Yes   Sig: Take 1 tablet (750 mg) by mouth 3 times daily as needed for muscle spasms (sternal pain)   metoprolol succinate ER (TOPROL-XL) 25 MG 24 hr tablet 1/10/2022 at Unknown time  No Yes   Sig: Take 0.5 tablets (12.5 mg) by mouth daily   mycophenolate (GENERIC EQUIVALENT) 250 MG capsule   No No   Sig: Take 3 capsules (750 mg) by mouth every 12 hours   order for DME   No No   Si Device by Device route daily Knee high compression socks 15-20 mmhg.    oxyCODONE (ROXICODONE) 5 MG tablet   No No   Sig: Take 1 tablet (5 mg) by mouth every 4 hours as needed for moderate to severe pain   pantoprazole (PROTONIX) 40 MG EC tablet 1/10/2022 at Unknown time  No Yes   Sig: Take 1 tablet (40 mg) by mouth daily before breakfast   polyethylene glycol (MIRALAX) 17 g packet   Yes No   Sig: Take 1 packet by mouth daily   predniSONE (DELTASONE) 5 MG tablet   No No   Sig: TAKE ONE TABLET BY MOUTH ONCE DAILY   rosuvastatin (CRESTOR) 5 MG tablet 1/10/2022 at Unknown time  No Yes   Sig: Take 1 tablet (5 mg) by mouth daily   senna-docusate (SENOKOT-S/PERICOLACE) 8.6-50 MG tablet   No No   Sig: Take 1 tablet by mouth 2 times daily as needed for constipation   tamsulosin (FLOMAX) 0.4 MG capsule 1/10/2022 at Unknown time  No Yes   Sig: Take 1 capsule (0.4 mg) by mouth daily   vitamin D3 (CHOLECALCIFEROL) 50 mcg (2000 units) tablet 1/10/2022 at Unknown time  No Yes   Sig: Take 1 tablet (50 mcg) by mouth daily      Facility-Administered Medications Last Administration Doses Remaining   aflibercept (EYLEA) injection prefilled syringe 2 mg 5/12/2021 10:33 AM 10   aflibercept (EYLEA) injection prefilled syringe 2 mg 5/12/2021 10:33 AM 11        Allergies   Allergies   Allergen Reactions     Codeine Other (See Comments)     Cannot take due to liver  Cannot tolerate oral narcotics     Seasonal Allergies      Sneezing, coughing, runny and itchy eyes       Physical Exam   Vital Signs: Temp: 97.7  F (36.5  C) Temp src: Oral BP: 121/58 Pulse: 103   Resp: 18 SpO2: 100 % O2 Device: None (Room air)    Weight: 156 lbs 1.6 oz  GENERAL: Alert and awake. Answering questions appropriately. NAD. Pleasant and conversational   HEENT: AT/NC. Anicteric sclera. EOMI. Mucous membranes moist   CARDIOVASCULAR: RRR. S1, S2. No murmurs, rubs, or gallops.    RESPIRATORY: Effort normal on RA. Clear to auscultation bilaterally, no rales, rhonchi or wheezes, respirations unlabored   GI: Abdomen soft, TTP in RUQ and  epigastrium without any rebound tenderness. normoactive bowel sounds present  EXTREMITIES: No peripheral edema.  No calf asymmetry, erythema, or tenderness.   NEUROLOGICAL:  CN II-XII grossly intact. Moving all extremities symmetrically.   SKIN: Intact. Warm and dry. No jaundice. Pale.   PSYCH: Affect appropriate     Data   Data reviewed today: I reviewed all medications, new labs and imaging results over the last 24 hours. I personally reviewed the EKG tracing showing Sinus tachycardia at 113 bpm. QTc 425. Normal axis. No acute ischemia. .    Recent Labs   Lab 01/11/22  2200 01/11/22  2056 01/11/22  0905 01/10/22  2142 01/05/22  0916   WBC 3.4*  --   --  3.9*  --    HGB 7.0*  --   --  6.9* 6.9*   MCV 99  --   --  100  --      --   --  225  --    INR 1.13  --   --   --   --      --   --  135 135   POTASSIUM 5.0  --   --  5.8* 4.1   CHLORIDE 109  --   --  106 101   CO2 20  --   --  19* 19*   BUN 68*  --   --  78* 63*   CR 2.03*  --   --  2.21* 2.41*   ANIONGAP 8  --   --  10 15*   DEBORAH 9.7  --   --  10.4* 9.7   * 148* 169* 195* 236*   ALBUMIN 3.3*  --   --  3.6  --    PROTTOTAL 7.6  --   --  8.6  --    BILITOTAL 0.3  --   --  0.4  --    ALKPHOS 98  --   --  106  --    ALT 15  --   --  19  --    AST 12  --   --  25  --    LIPASE 115  --   --   --   --      Recent Results (from the past 24 hour(s))   XR Chest Port 1 View    Narrative    EXAM: XR CHEST PORT 1 VIEW  LOCATION: [facility]  DATE/TIME: 1/10/2022 11:41 PM    INDICATION: cp  COMPARISON: 07/20/2021      Impression    IMPRESSION: Multiple median sternotomy wires. Borderline enlarged heart. No pneumothorax or pleural effusion. No focal consolidation. No acute osseous abnormality.   Echocardiogram Complete   Result Value    LVEF  55-60%    Narrative    [id number]  [id number]  [id number]  [id number]     [facility],[place]  Echocardiography Laboratory  [address] [place] [address]  [place] [address]     Name: DAVID SANDERS  MRN: [id number]  : 1965  Study Date: 2022 07:45 AM  Age: 56 yrs  Gender: Male  Patient Location: [facility]  Reason For Study: Dyspnea  Ordering Physician: MILKA LOPEZ  Performed By: Lili Edwards     BSA: 1.9 m2  Height: 69 in  Weight: 157 lb  ______________________________________________________________________________  Procedure  Complete Portable Echo Adult. Contrast Optison. Optison (NDC #9085-9293-49)  given intravenously. Patient was given 6 ml mixture of 3 ml Optison and 6 ml  saline. 3 ml wasted.  ______________________________________________________________________________  Interpretation Summary  Left ventricular function is normal.The ejection fraction is 55-60%. Distal  septal hypokinesis is present  Global right ventricular function is mildly reduced.  No significant valvular abnormalities present.  The aortic root is mildly dilated at 4.0 cm.  No pericardial effusion is present.  ______________________________________________________________________________  Left Ventricle  Left ventricular wall thickness is normal. Left ventricular function is  normal.The ejection fraction is 55-60%. Left ventricular diastolic function is  normal. Distal septal hypokinesis is present.     Right Ventricle  The right ventricle is normal size. Global right ventricular function is  mildly reduced.     Atria  Both atria appear normal.     Mitral Valve  The mitral valve is normal.     Aortic Valve  Aortic valve is normal in structure and function. The aortic valve is  tricuspid.     Tricuspid Valve  The tricuspid valve is normal. Trace tricuspid insufficiency is present.     Pulmonic Valve  The pulmonic valve is normal.     Vessels  Sinuses of Valsalva 4.0 cm. Ascending aorta 3.3 cm. Small inferior vena cava  size consistent with hypovolemia.     Pericardium  No pericardial effusion is present.     Miscellaneous  No significant valvular abnormalities  present.     Compared to Previous Study  This study was compared with the study from 21 . When compared with  prior, regional wall motion abnormalities previously seen in the mid  anteroseptal, distal septal and apex are improved.     Attestation  I have personally viewed the imaging and agree with the interpretation and  report as documented by the fellow, Dahlia Lomax, and/or edited by me.     ______________________________________________________________________________  MMode/2D Measurements & Calculations  IVSd: 0.92 cm  LVIDd: 3.9 cm  LVIDs: 2.6 cm  LVPWd: 1.0 cm  FS: 33.5 %  LV mass(C)d: 119.8 grams  LV mass(C)dI: 64.3 grams/m2  Ao root diam: 4.0 cm  asc Aorta Diam: 3.3 cm  LVOT diam: 2.5 cm  LVOT area: 5.0 cm2     EF(MOD-bp): 54.9 %     LA Volume (BP): 49.0 ml  LA Volume Index (BP): 26.3 ml/m2  RWT: 0.53     Doppler Measurements & Calculations  MV E max vivian: 80.5 cm/sec  MV A max vivian: 95.8 cm/sec  MV E/A: 0.84  MV dec time: 0.15 sec  TR max vivian: 244.3 cm/sec  TR max P.9 mmHg  RVSP(TR): 33.9 mmHg  E/E' av.6  Lateral E/e': 6.9     Medial E/e': 10.3     ______________________________________________________________________________  Report approved by: Radha Gomez 2022 10:44 AM

## 2022-01-12 NOTE — CONSULTS
Care Management Initial Consult    General Information  Assessment completed with: Patient,VM-chart review,    Type of CM/SW Visit: Initial Assessment    Primary Care Provider verified and updated as needed: Yes   Readmission within the last 30 days:        Reason for Consult:  (Elevated readmission risk score)  Advance Care Planning:            Communication Assessment  Patient's communication style: spoken language (English or Bilingual)    Hearing Difficulty or Deaf: no   Wear Glasses or Blind: yes    Cognitive  Cognitive/Neuro/Behavioral: WDL                      Living Environment:   People in home: alone     Current living Arrangements: apartment      Able to return to prior arrangements: yes       Family/Social Support:  Care provided by: self  Provides care for: no one     Other (specify) (Friends)          Description of Support System: Supportive,Involved    Support Assessment: Adequate family and caregiver support    Current Resources:   Patient receiving home care services: No     Community Resources: Housekeeping/Chore Agency  Equipment currently used at home: cane, straight (winter only)  Supplies currently used at home: None    Employment/Financial:  Employment Status:    Not addressed      Financial Concerns: No concerns identified           Lifestyle & Psychosocial Needs:  Social Determinants of Health     Tobacco Use: Medium Risk     Smoking Tobacco Use: Former Smoker     Smokeless Tobacco Use: Former User   Alcohol Use: Not At Risk     Frequency of Alcohol Consumption: Never     Average Number of Drinks: Patient refused     Frequency of Binge Drinking: Never   Financial Resource Strain: Low Risk      Difficulty of Paying Living Expenses: Not very hard   Food Insecurity: No Food Insecurity     Worried About Running Out of Food in the Last Year: Never true     Ran Out of Food in the Last Year: Never true   Transportation Needs: No Transportation Needs     Lack of Transportation (Medical): No      "Lack of Transportation (Non-Medical): No   Physical Activity: Not on file   Stress: Not on file   Social Connections: Not on file   Intimate Partner Violence: Not on file   Depression: Not at risk     PHQ-2 Score: 2   Housing Stability: Not on file       Functional Status:  Prior to admission patient needed assistance:   Dependent ADLs:: Independent  Dependent IADLs:: Independent  Pt notes no concerns or barriers to managing medications or following up outpatient with PCP and speciality providers.        Mental Health Status: Pt with history of bipolar.  Pt notes no concerns other than feeling more :shut in\" d/t COVID.  Pt states he has good support from his friends.           Chemical Dependency Status: No concerns noted.                 Values/Beliefs:  Spiritual, Cultural Beliefs, Yazdanism Practices, Values that affect care:                 Additional Information:  Pt admitted with acute on chronic anemia, JERONIMO and hyperkalemia.  Pt with a medical history significant for HTN, HLD, CAD s/p 2 vessel CABG 7/2021, liver cirrhosis 2/2 ESTRADA c/b HCC and PVT s/p liver transplant 11/2019, CKD stage III, chronic anemia on aranesp, IDDM2, BPH, depressive disorder, bipolar disorder.  CMA d/t elevated readmission risk score.    Met with pt.  Introduced RNCC role.  Pt notes no concerns or questions at this time.  Per pt his friends will transport him home when medically cleared for discharge.  Agreed to have RNCC/SW f/u with pt should discharge needs be noted.     Cristin Magana RN BSN, PHN, ACM-RN  7A RN Care Coordinator  Phone: [phone]  Pager [phone]  To contact the weekend RNCC, Page: [phone]    1/12/2022 2:41 PM        "

## 2022-01-12 NOTE — PLAN OF CARE
/60 (BP Location: Right arm)   Pulse 92   Temp 98.1  F (36.7  C) (Oral)   Resp 18   Wt 70.8 kg (156 lb 1.6 oz)   SpO2 100%   BMI 23.05 kg/m    Neuro: A&Ox4.   Cardiac: Afebrile, VSS.   Respiratory: RA. Intermittent nonproductive cough  GI/: Voiding spontaneously. No BM this shift.   Diet/appetite: NPO for abd US this am. Denies nausea   Activity: Up with stand by assist/independent. Steady gait,   Pain: .reports resolving chest pain  Skin: No new deficits noted.  Lines: PIV- LR infusing at 75ml/hr    One unit PRBC's admin without issue. Recheck with am labs.    Rested btwn cares. Able to make needs known. Continue to monitor. Notify MD of changes/concerns.    Problem: Adult Inpatient Plan of Care  Goal: Patient-Specific Goal (Individualized)  Outcome: No Change

## 2022-01-12 NOTE — PROGRESS NOTES
[facility]   ED Nurse to Floor Handoff     Frandy Workman is a 56 year old male who speaks English and lives alone,  in a home  They arrived in the ED by car from emergency room    ED Chief Complaint: Chest Pain and Shortness of Breath    ED Dx;   Final diagnoses:   Chest pain, unspecified type         Needed?: No    Allergies:   Allergies   Allergen Reactions     Codeine Other (See Comments)     Cannot take due to liver  Cannot tolerate oral narcotics     Seasonal Allergies      Sneezing, coughing, runny and itchy eyes   .  Past Medical Hx:   Past Medical History:   Diagnosis Date     Anemia 2013     Arthritis      BPH (benign prostatic hyperplasia)      CAD (coronary artery disease) 4/1/2019     Cholelithiasis      Conductive hearing loss 08/16/2017     Depressive disorder 1986    Suffer effects throughout life     Gastroesophageal reflux disease 12/01/2014     HCC (hepatocellular carcinoma) (H) 1/22/2019     History of diabetic retinopathy 07/2018     HTN (hypertension)      HTN (hypertension) 11/20/2019     Hyperlipidemia      Liver cirrhosis secondary to ESTRADA (H)      Liver transplanted (H) 11/11/2019     Portal vein thrombosis 4/11/2019     Type II diabetes mellitus (H)       Baseline Mental status: WDL  Current Mental Status changes: at basesline    Infection present or suspected this encounter: no  Sepsis suspected: No  Isolation type: No active isolations  Patient tested for COVID 19 prior to admission: YES     Activity level - Baseline/Home:  Independent  Activity Level - Current:   Independent    Bariatric equipment needed?: Yes    In the ED these meds were given:   Medications   sodium chloride 0.9 % infusion (has no administration in time range)   ARIPiprazole (ABILIFY) tablet 5 mg (5 mg Oral Given 1/11/22 0900)   insulin aspart (NovoLOG) injection (RAPID ACTING) (has no administration in time range)   insulin degludec (TRESIBA) 100 UNIT/ML  injection 26 Units (26 Units Subcutaneous Given 1/11/22 0900)   lisinopril (ZESTRIL) tablet 5 mg (5 mg Oral Given 1/11/22 0900)   methocarbamol (ROBAXIN) tablet 750 mg (has no administration in time range)   metoprolol succinate ER (TOPROL-XL) 24 hr half-tab 12.5 mg (12.5 mg Oral Given 1/11/22 0900)   mycophenolate (GENERIC EQUIVALENT) capsule 750 mg (750 mg Oral Given 1/11/22 0859)   lamiVUDine (EPIVIR) tablet 100 mg (100 mg Oral Given 1/11/22 0900)   rosuvastatin (CRESTOR) tablet 5 mg (5 mg Oral Given 1/11/22 0900)   predniSONE (DELTASONE) tablet 5 mg (5 mg Oral Given 1/11/22 0900)   sodium chloride (PF) 0.9% PF flush 6 mL (has no administration in time range)   perflutren diluted 1mL to 2mL with saline (OPTISON) diluted injection 6 mL (6 mLs Intravenous Given 1/11/22 0911)       Drips running?  No    Home pump  No    Current LDAs  Peripheral IV 09/24/21 Anterior;Distal;Right Upper arm (Active)   Number of days: 109       Peripheral IV 01/10/22 Left Hand (Active)   Site Assessment WDL 01/10/22 2323   Line Status Saline locked 01/10/22 2323   Number of days: 1       Incision/Surgical Site 07/14/21 Bilateral Chest (Active)   Number of days: 181       Incision/Surgical Site 07/14/21 Left Leg (Active)   Number of days: 181       Incision/Surgical Site 07/21/21 Upper;Medial Abdomen (Active)   Number of days: 174       Labs results:   Labs Ordered and Resulted from Time of ED Arrival to Time of ED Departure   COMPREHENSIVE METABOLIC PANEL - Abnormal       Result Value    Sodium 135      Potassium 5.8 (*)     Chloride 106      Carbon Dioxide (CO2) 19 (*)     Anion Gap 10      Urea Nitrogen 78 (*)     Creatinine 2.21 (*)     Calcium 10.4 (*)     Glucose 195 (*)     Alkaline Phosphatase 106      AST 25      ALT 19      Protein Total 8.6      Albumin 3.6      Bilirubin Total 0.4      GFR Estimate 34 (*)    ROUTINE UA WITH MICROSCOPIC REFLEX TO CULTURE - Abnormal    Color Urine Light Yellow      Appearance Urine Clear       Glucose Urine >=1000 (*)     Bilirubin Urine Negative      Ketones Urine Negative      Specific Gravity Urine 1.018      Blood Urine Negative      pH Urine 5.0      Protein Albumin Urine Negative      Urobilinogen Urine Normal      Nitrite Urine Negative      Leukocyte Esterase Urine Negative      Bacteria Urine Few (*)     Mucus Urine Present (*)     RBC Urine <1      WBC Urine 1      Granular Casts Urine 3 (*)    CBC WITH PLATELETS AND DIFFERENTIAL - Abnormal    WBC Count 3.9 (*)     RBC Count 2.24 (*)     Hemoglobin 6.9 (*)     Hematocrit 22.4 (*)           MCH 30.8      MCHC 30.8 (*)     RDW 14.9      Platelet Count 225      % Neutrophils 70      % Lymphocytes 21      % Monocytes 6      % Eosinophils 1      % Basophils 1      % Immature Granulocytes 1      NRBCs per 100 WBC 0      Absolute Neutrophils 2.8      Absolute Lymphocytes 0.8      Absolute Monocytes 0.2      Absolute Eosinophils 0.0      Absolute Basophils 0.0      Absolute Immature Granulocytes 0.0      Absolute NRBCs 0.0     ISTAT GASES LACTATE VENOUS POCT - Abnormal    Lactic Acid POCT 0.7      Bicarbonate Venous POCT 19 (*)     O2 Sat, Venous POCT 40 (*)     pCO2V Venous POCT 34 (*)     pH Venous POCT 7.37      pO2 Venous POCT 23 (*)    GLUCOSE BY METER - Abnormal    GLUCOSE BY METER POCT 169 (*)    LACTIC ACID WHOLE BLOOD - Abnormal    Lactic Acid 0.5 (*)    TROPONIN I - Normal    Troponin I High Sensitivity 5     MAGNESIUM - Normal    Magnesium 2.3     INFLUENZA A/B & SARS-COV2 PCR MULTIPLEX - Normal    Influenza A PCR Negative      Influenza B PCR Negative      SARS CoV2 PCR Negative     TROPONIN I - Normal    Troponin I High Sensitivity 7     TYPE AND SCREEN, ADULT    ABO/RH(D) O POS      Antibody Screen Negative      SPECIMEN EXPIRATION DATE [id number]     PREPARE RED BLOOD CELLS (UNIT)    CROSSMATCH Compatible      UNIT ABO/RH O Pos      Unit Number [id number]      Unit Status Issued      Blood Component Type Red Blood Cells       Product Code H9382I57      CODING SYSTEM [id number]      UNIT TYPE ISBT 5100      ISSUE DATE AND TIME [id number]     PREPARE RED BLOOD CELLS (UNIT)   TRANSFUSE RED BLOOD CELLS (UNIT)   ABO/RH TYPE AND SCREEN       Imaging Studies:   Recent Results (from the past 24 hour(s))   XR Chest Port 1 View    Narrative    EXAM: XR CHEST PORT 1 VIEW  LOCATION: [facility]  DATE/TIME: 1/10/2022 11:41 PM    INDICATION: cp  COMPARISON: 2021      Impression    IMPRESSION: Multiple median sternotomy wires. Borderline enlarged heart. No pneumothorax or pleural effusion. No focal consolidation. No acute osseous abnormality.   Echocardiogram Complete   Result Value    LVEF  55-60%    Narrative    [id number]  [id number]  [id number]  [id number]^JESSICA^MILKA^VIJAY     [facility] [place]  Echocardiography Laboratory  [address] [place], [place] [address]     Name: DAVID SANDERS  MRN: [id number]  : 1965  Study Date: 2022 07:45 AM  Age: 56 yrs  Gender: Male  Patient Location: [facility]  Reason For Study: Dyspnea  Ordering Physician: MILKA LOPEZ  Performed By: Lili Edwards     BSA: 1.9 m2  Height: 69 in  Weight: 157 lb  ______________________________________________________________________________  Procedure  Complete Portable Echo Adult. Contrast Optison. Optison (NDC #9705-9592-78)  given intravenously. Patient was given 6 ml mixture of 3 ml Optison and 6 ml  saline. 3 ml wasted.  ______________________________________________________________________________  Interpretation Summary  Left ventricular function is normal.The ejection fraction is 55-60%. Distal  septal hypokinesis is present  Global right ventricular function is mildly reduced.  No significant valvular abnormalities present.  The aortic root is mildly dilated at 4.0 cm.  No pericardial effusion is present.  ______________________________________________________________________________  Left  Ventricle  Left ventricular wall thickness is normal. Left ventricular function is  normal.The ejection fraction is 55-60%. Left ventricular diastolic function is  normal. Distal septal hypokinesis is present.     Right Ventricle  The right ventricle is normal size. Global right ventricular function is  mildly reduced.     Atria  Both atria appear normal.     Mitral Valve  The mitral valve is normal.     Aortic Valve  Aortic valve is normal in structure and function. The aortic valve is  tricuspid.     Tricuspid Valve  The tricuspid valve is normal. Trace tricuspid insufficiency is present.     Pulmonic Valve  The pulmonic valve is normal.     Vessels  Sinuses of Valsalva 4.0 cm. Ascending aorta 3.3 cm. Small inferior vena cava  size consistent with hypovolemia.     Pericardium  No pericardial effusion is present.     Miscellaneous  No significant valvular abnormalities present.     Compared to Previous Study  This study was compared with the study from 21 . When compared with  prior, regional wall motion abnormalities previously seen in the mid  anteroseptal, distal septal and apex are improved.     Attestation  I have personally viewed the imaging and agree with the interpretation and  report as documented by the fellow, Dahlia Lomax, and/or edited by me.     ______________________________________________________________________________  MMode/2D Measurements & Calculations  IVSd: 0.92 cm  LVIDd: 3.9 cm  LVIDs: 2.6 cm  LVPWd: 1.0 cm  FS: 33.5 %  LV mass(C)d: 119.8 grams  LV mass(C)dI: 64.3 grams/m2  Ao root diam: 4.0 cm  asc Aorta Diam: 3.3 cm  LVOT diam: 2.5 cm  LVOT area: 5.0 cm2     EF(MOD-bp): 54.9 %     LA Volume (BP): 49.0 ml  LA Volume Index (BP): 26.3 ml/m2  RWT: 0.53     Doppler Measurements & Calculations  MV E max vivian: 80.5 cm/sec  MV A max vivian: 95.8 cm/sec  MV E/A: 0.84  MV dec time: 0.15 sec  TR max vivian: 244.3 cm/sec  TR max P.9 mmHg  RVSP(TR): 33.9 mmHg  E/E' av.6  Lateral E/e':  6.9     [facility] E/e': 10.3     ______________________________________________________________________________  Report approved by: Radha Gomez 01/11/2022 10:44 AM             Recent vital signs:   BP 99/67   Pulse 94   Temp 97.7  F (36.5  C) (Oral)   Resp 18   Wt 71.2 kg (157 lb)   SpO2 99%   BMI 23.18 kg/m              Cardiac Rhythm: Normal Sinus  Pt needs tele? No  Skin/wound Issues: None      Pain control: good    Nausea control: pt had none    Abnormal labs/tests/findings requiring intervention: SEE LABS    Family present during ED course? No   Family Comments/Social Situation comments: NONE    Tasks needing completion: None    Mary Beth Waggoner RN  [phone] [facility]  [phone] [facility]

## 2022-01-12 NOTE — PROGRESS NOTES
Admitted/transferred from: [facility] ED  2 RN full   skin assessment completed by Pily Kaye, RN and KELLI Hanna RN.  Skin assessment finding: issues found old incisional scars on chest and abd, PIV   Interventions/actions: skin interventions pt education, nutrition and hydration, activity OOB

## 2022-01-12 NOTE — PLAN OF CARE
/58 (BP Location: Right arm)   Pulse 103   Temp 97.7  F (36.5  C) (Oral)   Resp 18   Wt 70.8 kg (156 lb 1.6 oz)   SpO2 100%   BMI 23.05 kg/m       Status: VSS  Pain/Nausea: C/O pain in bilateral abd and midsternal chest pain rated 5/10- gave PRN Robaxin. Denies N/V  Mobility: Up ad karely  Diet: Regular  Labs: Hgb 6.9- got one unit of blood on [facility], recheck 7.0 . K 5.8- pending recheck  LDAs: L PIV-SL  Skin/incisions: Intact ex old incisional scars on abd and chest  Neuro: A&Ox4, calls appropriately. Baseline N/T in bilateral feet  Respiratory: RA sating mid-high 90s. LS clear, SOB reported. Frequent dry cough  Cardiac: WDL ex tachycardia and soft Bps within parameter. Chest pain improving but still 5/10. EKG completed  GI/: Voiding spontaneously without difficulty. BM today. +BS  New Changes: No acute changes this shift  Plan: Continue with POC. Pt to be NPO @midnight for US tomorrow morning

## 2022-01-12 NOTE — PLAN OF CARE
NEURO: A&O x4, pleasant. Numbness/tingling in BLE at baseline. Calls appropriately.  RESPIRATORY: LS diminished. Satting well on RA. Dyspnea on exertion.   CARDIAC: Soft BP, but within parameters. Intermittently tachycardic in low 100s. C/o ongoing midsternal chest pain rating 5/10-team aware.  GI/: Voiding spontaneously, not saving. +BS, LBM 1/12  DIET: Regular diet, fair appetite.  PAIN/NAUSEA: C/o midsternal chest pain, declined PRN medications. Denies nausea   SKIN/INCISION/DRAINS: Skin intact.   IV ACCESS: L PIV infusing LR @ 75 mL/hr   ACTIVITY: Up ad karely   LAB: WBC 3.5, hgb 7.9 (received one unit blood overnight), creatinine 1.72,  and 128  PLAN: Continue with POC, possible discharge tomorrow.

## 2022-01-12 NOTE — CONSULTS
[facility]    Hepatology Consult    Requesting provider: Dr. Dickerson    Consult requested for Hx of liver transplant here for pleuritic chest pain. 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  Hepatology was consulted for immunosuppressive management.    Patient stated that he started having chest pain along with shortness of breath started 4 days back with progressive worsening presented to emergency department to get evaluated his chest pain has since then resolved.  He also complained of bilateral upper quadrant pain along his incision line which is also resolving.  Denies any fever or chills no sick contacts.  He stated that he has been having cough since his travel to [place] in December 2021.  No jaundice or significant signs    Denied alcohol intake or smoking, lives alone.  States that he has been compliant with his medications.      Medical hx Surgical hx   Past Medical History:   Diagnosis Date     Anemia 2013     Arthritis      BPH (benign prostatic hyperplasia)      CAD (coronary artery disease) 4/1/2019     Cholelithiasis      Conductive hearing loss 08/16/2017     Depressive disorder 1986    Suffer effects throughout life     Gastroesophageal reflux disease 12/01/2014     HCC (hepatocellular carcinoma) (H) 1/22/2019     History of diabetic retinopathy 07/2018     HTN (hypertension)      HTN (hypertension) 11/20/2019     Hyperlipidemia      Liver cirrhosis secondary to ESTRADA (H)      Liver transplanted (H) 11/11/2019     Portal vein thrombosis 4/11/2019     Type II diabetes mellitus (H)       Past Surgical History:   Procedure Laterality Date     BYPASS GRAFT ARTERY CORONARY N/A 7/14/2021    Procedure: median sternotomy, on cardiopulmonary  bypass, CORONARY ARTERY BYPASS GRAFT (CABG) x2 with left greater saphenous vein endoscopic harvest and left internal mammery artery harvest;  Surgeon: Tom Zapata MD;  Location: [facility]     COLONOSCOPY      2015     COLONOSCOPY N/A 12/6/2019    Procedure: COLONOSCOPY, WITH POLYPECTOMY AND BIOPSY;  Surgeon: Adam Morton MD;  [facility]     CV CENTRAL VENOUS CATHETER PLACEMENT N/A 7/12/2021    Procedure: Central Venous Catheter Placement;  Surgeon: Fermin Polanco MD;  Location:  [facility]ARD[facility] CATH LAB     CV CORONARY ANGIOGRAM N/A 7/12/2021    Procedure: Coronary Angiogram;  Surgeon: Fermin Polanco MD;  Location:  [facility] CATH [facility]     CV HEART CATHETERIZATION WITH POSSIBLE INTERVENTION N/A 2/26/2019    Procedure: CORS;  Surgeon: Jagdish Hoyt MD;  Location:  HE[facility] CARD[facility]AC CATH LAB     CV INTRA AORTIC BALLOON N/A 7/12/2021    Procedure: Intra Aortic Balloon Pump Insertion;  Surgeon: Fermin Polanco MD;  Location:  HEART CARDIAC CATH LAB     ESOPHAGOSCOPY, GASTROSCOPY, DUODENOSCOPY (EGD), COMBINED N/A 11/17/2016    Procedure: COMBINED ESOPHAGOSCOPY, GASTROSCOPY, DUODENOSCOPY (EGD);  Surgeon: Santi Rosas MD;  [facility]     ESOPHAGOSCOPY, GASTROSCOPY, DUODENOSCOPY (EGD), COMBINED N/A 11/17/2017    Procedure: COMBINED ESOPHAGOSCOPY, GASTROSCOPY, DUODENOSCOPY (EGD);  EGD;  Surgeon: Santi Rosas MD;  [facility]: [facility]     ESOPHAGOSCOPY, GASTROSCOPY, DUODENOSCOPY (EGD), COMBINED N/A 12/28/2018    Procedure: EGD;  Surgeon: Santi Rosas MD;  [facility]     ESOPHAGOSCOPY, GASTROSCOPY, DUODENOSCOPY (EGD), COMBINED N/A 12/6/2019    Procedure: ESOPHAGOGASTRODUODENOSCOPY, WITH BIOPSY;  Surgeon: Adam Morton MD;  Location:  GI     ESOPHAGOSCOPY, GASTROSCOPY, DUODENOSCOPY (EGD), COMBINED N/A 2/13/2020    Procedure: ESOPHAGOGASTRODUODENOSCOPY (EGD);  Surgeon: Santi Rosas MD;  Location:  [facility]     HEAD & NECK SURGERY      2017 at [facility].      IMPLANT GOLD WEIGHT EYELID Right 2017    Procedure: IMPLANT WEIGHT EYELID;  Right Upper Eyelid Weight, right tarsal strip lower eyelid;  Surgeon: Milana Malave MD;  [facility]     IR CHEMO EMBOLIZATION  2019     KNEE SURGERY Left      ORTHOPEDIC SURGERY       PAROTIDECTOMY, RADICAL NECK DISSECTION Right 2017    Procedure: PAROTIDECTOMY, RADICAL NECK DISSECTION;  Right Superfacial Parotidectomy , Facial nerve repair. with [facility] facial nerve monitor.;  Surgeon: Asiya Mrogan MD;  Location: [facility] OR     PICC INSERTION Left 2017    4fr SL BioFlo PICC, 44cm in the L basilic vein w/ tip in the low SVC     RETURN LIVER TRANSPLANT N/A 2019    Procedure: Exploratory laparotomy, hematoma evacuation, abdominal washout;  Surgeon: Александр Ramos MD;  Location: [facility]     TRANSPLANT LIVER RECIPIENT  DONOR N/A 2019    Procedure: TRANSPLANT, LIVER, RECIPIENT,  DONOR;  Surgeon: Александр Ramos MD;  Location: [facility]     VASCULAR SURGERY            Medications  Current Facility-Administered Medications   Medication Dose Route Frequency     ARIPiprazole  5 mg Oral At Bedtime     aspirin  162 mg Oral Daily     cyanocobalamin  1,000 mcg Oral Daily     [Held by provider] empagliflozin  10 mg Oral Daily     heparin ANTICOAGULANT  5,000 Units Subcutaneous Q12H     [Held by provider] insulin aspart   Subcutaneous TID w/meals     insulin aspart  1-7 Units Subcutaneous TID AC     insulin aspart  1-5 Units Subcutaneous At Bedtime     insulin degludec  26 Units Subcutaneous Daily     lamiVUDine  100 mg Oral Daily     [Held by provider] lisinopril  5 mg Oral Daily     metoprolol succinate ER  12.5 mg Oral Daily     miconazole   Topical BID     multivitamin, therapeutic  1 tablet Oral Daily     mycophenolate  750 mg Oral Q12H     pantoprazole  40 mg Oral QAM AC     polyethylene glycol  17 g Oral Daily     predniSONE  5 mg Oral  Daily     rosuvastatin  5 mg Oral Daily     sodium chloride (PF)  3 mL Intracatheter Q8H     sodium chloride (PF)  6 mL Intravenous Once     tamsulosin  0.4 mg Oral Daily     vitamin D3  50 mcg Oral Daily       Allergies  Allergies   Allergen Reactions     Codeine Other (See Comments)     Cannot take due to liver  Cannot tolerate oral narcotics     Seasonal Allergies      Sneezing, coughing, runny and itchy eyes       Family hx Social hx   Family History   Problem Relation Age of Onset     Prostate Cancer Maternal Grandfather      Substance Abuse Maternal Grandfather         Alcohol     Colon Cancer Father 60     Pancreatic Cancer Father 60     Prostate Cancer Father      Colorectal Cancer Father      Macular Degeneration Father      Cancer Father      Glaucoma Father      Skin Cancer Father      Colorectal Cancer Maternal Grandmother      Cancer Maternal Grandmother      Substance Abuse Maternal Grandmother         Alcohol     Colorectal Cancer Paternal Grandmother      Cancer Mother      Diabetes Mother          3/2016     Cerebrovascular Disease Mother         Passed away in Feb of this year, 80 years old.     Thyroid Disease Mother      Depression Mother      Asthma Sister         Had since birth     Thyroid Disease Sister      Depression Sister      Liver Disease No family hx of      Melanoma No family hx of       Social History     Tobacco Use     Smoking status: Former Smoker     Packs/day: 6.00     Years: 30.00     Pack years: 180.00     Types: Cigars     Start date: 2016     Quit date: 10/25/2017     Years since quittin.2     Smokeless tobacco: Former User     Types: Chew     Quit date: 10/31/2017     Tobacco comment: 1 tin per week   Substance Use Topics     Alcohol use: No     Alcohol/week: 0.0 standard drinks     Comment: quit 1996     Drug use: No          Review of systems  A 10-point review of systems was negative.      Examination  BP 99/66 (BP Location: Right arm)   Pulse 93    Temp 98.5  F (36.9  C) (Oral)   Resp 18   Wt 70.8 kg (156 lb 1.6 oz)   SpO2 97%   BMI 23.05 kg/m      Intake/Output Summary (Last 24 hours) at 1/12/2022 0943  Last data filed at 1/12/2022 0659  Gross per 24 hour   Intake 620 ml   Output --   Net 620 ml       Gen- well, NAD, A+Ox3, normal color  Eye- EOMI  ENT- MMM  CVS- S1, S2  RS- CTA  Abd-mild epigastric pain, not distended  Extr- no LEILA  Neuro- no asterixis  Skin- no rash    Laboratory  Lab Results   Component Value Date     01/12/2022     06/28/2021    POTASSIUM 4.6 01/12/2022    POTASSIUM 4.6 06/28/2021    CHLORIDE 112 01/12/2022    CHLORIDE 107 06/28/2021    CO2 18 01/12/2022    CO2 25 06/28/2021    BUN 60 01/12/2022    BUN 33 06/28/2021    CR 1.72 01/12/2022    CR 1.62 06/28/2021       Lab Results   Component Value Date    BILITOTAL 0.5 01/12/2022    BILITOTAL 0.5 06/28/2021    ALT 14 01/12/2022    ALT 20 06/28/2021    AST 8 01/12/2022    AST 9 06/28/2021    ALKPHOS 98 01/12/2022    ALKPHOS 98 06/28/2021       Lab Results   Component Value Date    ALBUMIN 3.1 01/12/2022    ALBUMIN 4.0 06/28/2021    PROTTOTAL 7.1 01/12/2022    PROTTOTAL 7.4 06/28/2021        Lab Results   Component Value Date    WBC 3.5 01/12/2022    WBC 4.4 06/28/2021    HGB 7.9 01/12/2022    HGB 7.3 06/28/2021    MCV 98 01/12/2022    MCV 96 06/28/2021     01/12/2022     06/28/2021       Lab Results   Component Value Date    INR 1.13 01/11/2022    INR 1.09 01/24/2020     CT ABD 11/26/21:  IMPRESSION: In this patient with history of hepatocellular carcinoma,  the current scan compared to CT chest, abdomen and pelvis 9/24/2021  shows:  1. Postsurgical changes of liver transplant. No suspicious focal liver  lesion.  2. Chronic portal hypertension sequale with mild splenomegaly,  esophageal varices and venous collaterals.  3. Multiple subcentimeter pulmonary nodules, unchanged. No new or  enlarging pulmonary nodule.       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  Hepatology was consulted for immunosuppressive management.    Pt has been transitioned to [facility] since March 2021. Liver enzymes are stable. US Liver pending. His last CT abd in Nov 2021 showed no recurrence of HCC.      Recommendations  -- Continue  BID along with prednisone 5 mg daily  -- Continue to monitor liver enzymes  -- Adequate protein intake    Thank you for involving us in this patient's care. Please do not hesitate to contact the GI service with any questions or concerns.     Pt care plan discussed with Dr. Banuelos, hepatology staff physician.    This note was created with voice recognition software, and while reviewed for accuracy, typos may remain.  Jean Marie Lou MD  Hepatology  #[phone]

## 2022-01-12 NOTE — PROGRESS NOTES
[facility]    Medicine Progress Note - Hospitalist Service, Gold 1       Date of Admission:  1/10/2022    Assessment & Plan           Frandy Workman is a 56 year old male with PMHx of HTN, HLD, CAD s/p 2 vessel CABG 7/2021, liver cirrhosis 2/2 ESTRADA c/b HCC and PVT s/p liver transplant 11/2019, CKD stage III, chronic anemia on aranesp, IDDM2, BPH, depressive disorder, bipolar disorder who presented with SOB, CP, and cough admitted on 1/10/2022 for acute on chronic anemia, JERONIMO, and hyperkalemia.      Cough, ROONEY and pleuritic chest pain   Stable pulmonary nodules  Acute onset x2 days. No associated fevers, and patient afebrile. Patient reports an exertional component of CP, but EKG reassuring and troponin negative x2. Chest pain reproducible on exam. CXR w/ likely atelectasis. COVID 19 swab negative as well as respiratory viral panel.Seems this is likely musculoskeletal in nature given its reproducible nature on exam. However given that he is immunosuppressed and has new anorexia, cough and dyspnea I will check fungal studies as well.  - monitor today  - EKG for chest pain, can try nitro too  - follow up histo, blasto, BD glucan, cocciomycosis  - sputum culture if able     Acute on chronic anemia, macrocytic  Patient with anemia since liver tx, possibly thought to be due to Imuran, which has since been discontinued. Iron studies 11/2021, with normal iron, elevated ferritin, and low TIBC. No recent B12. Now thought to be due to anemia of chronic disease from CKD, receiving IVELISSE every 2 weeks. Hgb 6.9 on admission, but also noted to be 6.9 on 1/5/22 on infusion clinic visit. Denies any blood loss, melena, or hematochezia. S/p 1 unit of pRBC with minimal improvement to 7.0.   - CBC in AM  - Transfuse for hgb >7.0, giving an additional 1 unit of pRBC now  - If does not respond to transfusion will work up for hemolysis (normal T bili), or consumptive process      JERONIMO on CKD  stage III   baseline Cr 1.6-1.8 prior to admission, though over the last week, Cr peaked at 2.41, improving with fluids.   - Strict I&Os, daily weights   - Trend BMP  - Hold Lisinopril     Anorexia   Patient noted lack of appetite for last week. He is uncertain as to why he just has not had a desire to eat. When he eats he has no issues and does not feel nauseas or full easily so unlikely there is some gastric issue. Will continue to follow and encourage PO intake.  - Regular diet   - Consider nutrition consult     Leukopenia, mild  Possibly 2/2 underlying infection vs medication induced (possibly MMF)  - Trend CBD with diff, infectious work up as above. If febrile, pan culture.      Hx of HCC s/p TACE and microwave ablation 1/2019  ESTRADA cirrhosis c/b PVT/SMA thrombosis   s/p Liver transplant 11/2019  Follows with Dr. Gaviria, with recent screening negative for recurrent HCC. Patient endorsing new RUQ abdominal pain over the last several days. Prior US liver with surgically absent gallbladder with patent vasculature on 12/2019.   - Continue IS:  mg BID, and prednisone 5 mg daily   - Lamivudine for Hep B core + donor ppx   - Abdominal US with duplex   - hepatology consulted      CAD s/p 2 vessel CABG to LAD and OM  HTN   HLD   Follows with cardiology at [facility] following recent CABG. After CABG, patient with ischemic cardiomyopathy at with EF at 45 % with WMA. On presentation patient reporting atypical chest pain. EKG without any acute ischemic changes. TTE on 1/11/22 with improvement in EF to 55-60%, and improvement of WMA. Troponin negative x2.   - Continue PTA  mg daily, rosuvastatin 5 mg daily, and metoprolol XL 12.5 mg daily   - Hold PTA Lisinopril 5 mg daily with JERONIMO      IDDM2  Hemoglobin A1c 6.0 on 1/10/22. Uses continuous glucose monitor. Follows with endocrine. Reports elevated BGs over the last several days in 200s.   - Continue PTA Tresiba 26 Units daily  - resume PTA Novolog 1:20 gram CHO TID  AC and with snacks  And Medium sliding scale Novolog TIDAC  - Hold PTA Empagliflozin 10 mg daily given     Depression, Bipolar disorder - Continue PTA Abilify 5 mg daily        Diet: Regular Diet Adult    DVT Prophylaxis: Heparin SQ  Carroll Catheter: Not present  Central Lines: None  Code Status: Full Code      Disposition Plan   Expected Discharge: 01/13/2022     Anticipated discharge location: home    Delays:            The patient's care was discussed with the Bedside Nurse, Patient and hepatology Consultant.    Harris Dickerson DO  Hospitalist Service, [address]  Securely message with the Vocera Web Console (learn more here)  Text page via [facility] Paging/Directory    Please see sign in/sign out for up to date coverage information    Clinically Significant Risk Factors Present on Admission                 ______________________________________________________________________    Interval History     Admitted overnight. Patient feeling better with fluids today. No SOB at rest, no chest pain at rest, does have chest pain with deep breathing. No fevers or chills. He endorses a cough but still not productive.    Four point ROS completed and negative unless listed above    Data reviewed today: I reviewed all medications, new labs and imaging results over the last 24 hours. I personally reviewed the TTE image(s) showing preserved EF, IVC looking > 50% collapsible with respiratory variation .    Physical Exam   Vital Signs: Temp: 98.9  F (37.2  C) Temp src: Oral BP: 108/68 Pulse: 103   Resp: 18 SpO2: 97 % O2 Device: None (Room air)    Weight: 156 lbs 1.6 oz    GENERAL: Alert and awake. Answering questions appropriately. NAD. Pleasant and conversational   CARDIOVASCULAR: RRR. S1, S2. No murmurs, rubs, or gallops.    RESPIRATORY: Effort normal on RA. Clear to auscultation bilaterally, no rales, rhonchi or wheezes, respirations unlabored   GI: Abdomen soft, TTP in RLQ without  rebound or guarding  EXTREMITIES: No peripheral edema.  No calf asymmetry, erythema, or tenderness.   NEUROLOGICAL:  CN II-XII grossly intact. Moving all extremities symmetrically.   SKIN: Intact. Warm and dry. No jaundice. Pale.   PSYCH: Affect appropriate     Data   Recent Labs   Lab 01/12/22  1211 01/12/22  0711 01/11/22  2200 01/11/22  0905 01/10/22  2142   WBC  --  3.5* 3.4*  --  3.9*   HGB  --  7.9* 7.0*  --  6.9*   MCV  --  98 99  --  100   PLT  --  149* 153  --  225   INR  --   --  1.13  --   --    NA  --  140 137  --  135   POTASSIUM  --  4.6 5.0  --  5.8*   CHLORIDE  --  112* 109  --  106   CO2  --  18* 20  --  19*   BUN  --  60* 68*  --  78*   CR  --  1.72* 2.03*  --  2.21*   ANIONGAP  --  10 8  --  10   DEBORAH  --  9.5 9.7  --  10.4*   * 122* 156*   < > 195*   ALBUMIN  --  3.1* 3.3*  --  3.6   PROTTOTAL  --  7.1 7.6  --  8.6   BILITOTAL  --  0.5 0.3  --  0.4   ALKPHOS  --  98 98  --  106   ALT  --  14 15  --  19   AST  --  8 12  --  25   LIPASE  --   --  115  --   --     < > = values in this interval not displayed.     Recent Results (from the past 24 hour(s))   XR Chest Port 1 View    Narrative    Exam: XR CHEST PORT 1 VIEW, 1/12/2022 9:19 AM    Indication: SOB, cough    Comparison: 1/10/2022    Findings:   Intact median sternotomy wires. Mediastinal surgical clips. Stable  cardiomediastinal silhouette. The pulmonary vasculature is within  normal limits. No appreciable pleural effusion or pneumothorax. Stable  linear and streaky bibasilar opacities.      Impression    Impression: Stable mild streaky bibasilar opacities, favored to  represent atelectasis. No new focal airspace opacity.    HAMLET MARTIN DO         SYSTEM ID:  [id number]   US Liver Transplant    Narrative    EXAMINATION:  LIVER TRANSPLANT, 1/12/2022 9:10 AM    COMPARISON: CT 11/26/2021, MR 9/25/2020, liver ultrasound 11/14/2019    HISTORY: RUQ abdominal pain with Hx of Liver tx.  JERONIMO, rule out  hydronephrosis    TECHNIQUE: The  abdomen was scanned in standard fashion with  specialized ultrasound transducer(s) using both grey scale and limited  color Doppler techniques.    Findings:    Liver: Hepatic parenchyma is of normal echogenicity without evidence  for focal mass. Main portal vein is patent with antegrade flow  measuring cm in diameter.    Gallbladder: Gallbladder is surgically absent. Both the intra and  extrahepatic biliary system are of normal caliber with the common duct  measuring 4 mm in diameter.    Pancreas: Not well-visualized.    Kidneys: Both kidneys are of normal echotexture, without mass nor  hydronephrosis. The craniocaudal dimensions are: right- 10.8 cm, left-  11.7 cm.    Urinary bladder: Unremarkable.    Spleen: The spleen is unremarkable and measures 14 cm in sagittal  dimension.    Fluid: No free fluid.    DOPPLER:  Doppler evaluation shows flow towards the liver in the  portal venous system.     Flow is   55 cm/sec in the extrahepatic native portal vein  59 cm/sec at the portal vein anastomosis  33 cm/sec in the intrahepatic transplant portal vein.     Flow is:  17 cm/sec in the leftward of the branch portal vein  26 cm/sec in the rightward branch of the portal vein  both flow towards the liver.     Flow in the hepatic artery is towards the liver and   47 cm/sec peak systolic  20 cm/sec minimum diastolic  0.58 resistive index.     The splenic vein is patent and flow is towards the liver. The left,  middle, and right hepatic veins are patent with flow towards the IVC.  The IVC is patent with flow towards the heart.  Aorta is not dilated.      Impression    Impression:   1. Normal grayscale and Doppler evaluation of the liver transplant.  2. No hydronephrosis.  3. Mild hepatomegaly.    I have personally reviewed the examination and initial interpretation  and I agree with the findings.    JUAN DAVID LOCKHART MD         SYSTEM ID:  [id number]     Medications     lactated ringers 75 mL/hr at 01/12/22 0359        ARIPiprazole  5 mg Oral At Bedtime     aspirin  162 mg Oral Daily     cyanocobalamin  1,000 mcg Oral Daily     [Held by provider] empagliflozin  10 mg Oral Daily     heparin ANTICOAGULANT  5,000 Units Subcutaneous Q12H     insulin aspart   Subcutaneous TID w/meals     insulin aspart  1-7 Units Subcutaneous TID AC     insulin aspart  1-5 Units Subcutaneous At Bedtime     insulin degludec  26 Units Subcutaneous Daily     lamiVUDine  100 mg Oral Daily     [Held by provider] lisinopril  5 mg Oral Daily     metoprolol succinate ER  12.5 mg Oral Daily     miconazole   Topical BID     multivitamin, therapeutic  1 tablet Oral Daily     mycophenolate  750 mg Oral Q12H     pantoprazole  40 mg Oral QAM AC     polyethylene glycol  17 g Oral Daily     predniSONE  5 mg Oral Daily     rosuvastatin  5 mg Oral Daily     sodium chloride (PF)  3 mL Intracatheter Q8H     sodium chloride (PF)  6 mL Intravenous Once     tamsulosin  0.4 mg Oral Daily     vitamin D3  50 mcg Oral Daily

## 2022-01-12 NOTE — PROGRESS NOTES
Received a nurse to nurse report. Awaiting for patient to arrive from [facility] ED to [address] [place] [address]. transport ordered by Malu, CHAVEZ.

## 2022-01-13 VITALS
SYSTOLIC BLOOD PRESSURE: 122 MMHG | DIASTOLIC BLOOD PRESSURE: 66 MMHG | WEIGHT: 154 LBS | HEART RATE: 87 BPM | OXYGEN SATURATION: 99 % | RESPIRATION RATE: 18 BRPM | TEMPERATURE: 98.2 F | BODY MASS INDEX: 22.74 KG/M2

## 2022-01-13 LAB
ANION GAP SERPL CALCULATED.3IONS-SCNC: 8 MMOL/L (ref 3–14)
BUN SERPL-MCNC: 45 MG/DL (ref 7–30)
CALCIUM SERPL-MCNC: 9.7 MG/DL (ref 8.5–10.1)
CHLORIDE BLD-SCNC: 111 MMOL/L (ref 94–109)
CO2 SERPL-SCNC: 21 MMOL/L (ref 20–32)
CREAT SERPL-MCNC: 1.35 MG/DL (ref 0.66–1.25)
ERYTHROCYTE [DISTWIDTH] IN BLOOD BY AUTOMATED COUNT: 16.5 % (ref 10–15)
GFR SERPL CREATININE-BSD FRML MDRD: 61 ML/MIN/1.73M2
GLUCOSE BLD-MCNC: 103 MG/DL (ref 70–99)
GLUCOSE BLDC GLUCOMTR-MCNC: 107 MG/DL (ref 70–99)
GLUCOSE BLDC GLUCOMTR-MCNC: 127 MG/DL (ref 70–99)
HCT VFR BLD AUTO: 24.3 % (ref 40–53)
HGB BLD-MCNC: 7.7 G/DL (ref 13.3–17.7)
MCH RBC QN AUTO: 30.3 PG (ref 26.5–33)
MCHC RBC AUTO-ENTMCNC: 31.7 G/DL (ref 31.5–36.5)
MCV RBC AUTO: 96 FL (ref 78–100)
PLATELET # BLD AUTO: 120 10E3/UL (ref 150–450)
POTASSIUM BLD-SCNC: 4.2 MMOL/L (ref 3.4–5.3)
RBC # BLD AUTO: 2.54 10E6/UL (ref 4.4–5.9)
SODIUM SERPL-SCNC: 140 MMOL/L (ref 133–144)
WBC # BLD AUTO: 3.1 10E3/UL (ref 4–11)

## 2022-01-13 PROCEDURE — 250N000013 HC RX MED GY IP 250 OP 250 PS 637: Performed by: PHYSICIAN ASSISTANT

## 2022-01-13 PROCEDURE — 87449 NOS EACH ORGANISM AG IA: CPT | Performed by: STUDENT IN AN ORGANIZED HEALTH CARE EDUCATION/TRAINING PROGRAM

## 2022-01-13 PROCEDURE — 36415 COLL VENOUS BLD VENIPUNCTURE: CPT | Performed by: STUDENT IN AN ORGANIZED HEALTH CARE EDUCATION/TRAINING PROGRAM

## 2022-01-13 PROCEDURE — 85027 COMPLETE CBC AUTOMATED: CPT | Performed by: STUDENT IN AN ORGANIZED HEALTH CARE EDUCATION/TRAINING PROGRAM

## 2022-01-13 PROCEDURE — 80048 BASIC METABOLIC PNL TOTAL CA: CPT | Performed by: STUDENT IN AN ORGANIZED HEALTH CARE EDUCATION/TRAINING PROGRAM

## 2022-01-13 PROCEDURE — 250N000011 HC RX IP 250 OP 636: Performed by: PHYSICIAN ASSISTANT

## 2022-01-13 PROCEDURE — 250N000012 HC RX MED GY IP 250 OP 636 PS 637: Performed by: PHYSICIAN ASSISTANT

## 2022-01-13 PROCEDURE — 99239 HOSP IP/OBS DSCHRG MGMT >30: CPT | Performed by: STUDENT IN AN ORGANIZED HEALTH CARE EDUCATION/TRAINING PROGRAM

## 2022-01-13 RX ADMIN — Medication 50 MCG: at 09:09

## 2022-01-13 RX ADMIN — Medication 1000 MCG: at 09:09

## 2022-01-13 RX ADMIN — Medication 12.5 MG: at 09:08

## 2022-01-13 RX ADMIN — PANTOPRAZOLE SODIUM 40 MG: 40 TABLET, DELAYED RELEASE ORAL at 09:08

## 2022-01-13 RX ADMIN — MYCOPHENOLATE MOFETIL 750 MG: 250 CAPSULE ORAL at 09:08

## 2022-01-13 RX ADMIN — LAMIVUDINE 100 MG: 100 TABLET, FILM COATED ORAL at 09:07

## 2022-01-13 RX ADMIN — INSULIN DEGLUDEC INJECTION 26 UNITS: 100 INJECTION, SOLUTION SUBCUTANEOUS at 09:08

## 2022-01-13 RX ADMIN — TAMSULOSIN HYDROCHLORIDE 0.4 MG: 0.4 CAPSULE ORAL at 09:09

## 2022-01-13 RX ADMIN — THERA TABS 1 TABLET: TAB at 09:08

## 2022-01-13 RX ADMIN — PREDNISONE 5 MG: 5 TABLET ORAL at 09:09

## 2022-01-13 RX ADMIN — HEPARIN SODIUM 5000 UNITS: 10000 INJECTION, SOLUTION INTRAVENOUS; SUBCUTANEOUS at 01:02

## 2022-01-13 RX ADMIN — ASPIRIN 162 MG: 81 TABLET, COATED ORAL at 09:07

## 2022-01-13 ASSESSMENT — ACTIVITIES OF DAILY LIVING (ADL)
ADLS_ACUITY_SCORE: 4

## 2022-01-13 NOTE — PROGRESS NOTES
The patient was given discharge education and verbalized understanding of the teaching. No discharge medications were prescribed. Belongings were packed and remain with the patient. The patient remains in stable condition and will discharge at 4 pm pending his ride availability.

## 2022-01-13 NOTE — PLAN OF CARE
/61 (BP Location: Right arm)   Pulse 90   Temp 98.6  F (37  C) (Oral)   Resp 18   Wt 70.8 kg (156 lb 1.6 oz)   SpO2 100%   BMI 23.05 kg/m        Neuros: Alert and oriented x4. Baseline numbness/tingling in lower extremities.     Cardiac: WDL ex- slightly tachy at times, BP can be soft but within parameters. Pt denies any chest pain this shift and is asymptomatic.     Respiratory: On RA sating middle 90's. Denies any SOB. Dry cough.     GI/Gu: Voiding spontaneously, not saving. LBM was 1/12.    Skin/Incisions: old abdominal and chest scars. PIV SL.     Pain: Denied pain this shift.     Diet: Regular diet. Tolerating well.     Activity: Up independently.     Plan: Continue with POC, possibly discharging today.

## 2022-01-13 NOTE — PLAN OF CARE
/67 (BP Location: Right arm)   Pulse 94   Temp 97.6  F (36.4  C) (Oral)   Resp 18   Wt 70.8 kg (156 lb 1.6 oz)   SpO2 100%   BMI 23.05 kg/m       Shift: [id number]  Status: VSS  Pain/Nausea: C/O pain in bilateral abd and midsternal chest pain rated 5/10-declined PRN meds. Denies N/V  Mobility: Up ad karely  Diet: Regular  Labs: Hgb 7.9, WBC 3.5,  and 157  LDAs: R PIV-LR @75mL/hr  Skin/incisions: Intact ex old incisional scars on abd and chest  Neuro: A&Ox4, calls appropriately. Baseline N/T in bilateral feet  Respiratory: RA sating mid-high 90s. LS clear-diminished, SOB reported. Frequent dry cough  Cardiac: WDL ex tachycardia and soft Bps within parameter. Chest pain unchanged but still 5/10  GI/: Voiding spontaneously without difficulty. BM today. +BS  New Changes: No acute changes this shift- sent sputum and urine sample  Plan: Continue with POC

## 2022-01-13 NOTE — DISCHARGE SUMMARY
[facility]  Hospitalist Discharge Summary      Date of Admission:  1/10/2022  Date of Discharge:  1/13/2022  Discharging Provider: Harris Dickerson DO  Discharge Team: Hospitalist Service, Gold 1    Discharge Diagnoses     Cough, ROONEY and pleuritic chest pain   Stable pulmonary nodules  Acute on chronic anemia, macrocytic  JERONIMO on CKD stage III   Anorexia   Leukopenia, mild  Hx of HCC s/p TACE and microwave ablation 1/2019  ESTRADA cirrhosis c/b PVT/SMA thrombosis   s/p Liver transplant 11/2019  CAD s/p 2 vessel CABG to LAD and OM  HTN   HLD   IDDM2  Depression, Bipolar disorder    Follow-ups Needed After Discharge     Follow up on histoplasma galactomannan, blastomyces antigen, 1,3 B D glucan fungitell and respiratory culture     Unresulted Labs Ordered in the Past 30 Days of this Admission     Date and Time Order Name Status Description    1/12/2022  5:07 PM Respiratory Aerobic Bacterial Culture Preliminary     1/12/2022  3:02 PM Coccidioides Agn Quant EIA Blood In process     1/12/2022  3:02 PM 1,3 Beta D glucan fungitell In process     1/12/2022  3:02 PM Histoplasma Galactomannan Antigen Quant by EIA, Urine In process     1/12/2022  3:02 PM Blastomyces Agn Quant EIA Non Blood In process       These results will be followed up by Dr. Dickerson    Discharge Disposition   Discharged to home  Condition at discharge: Stable    Hospital Course     Frandy Workman is a 56 year old male with PMHx of HTN, HLD, CAD s/p 2 vessel CABG 7/2021, liver cirrhosis 2/2 ESTRADA c/b HCC and PVT s/p liver transplant 11/2019, CKD stage III, chronic anemia on aranesp, IDDM2, BPH, depressive disorder, bipolar disorder who presented with SOB, CP, and cough admitted on 1/10/2022 for acute on chronic anemia, JERONIMO, and chest pain. On admission EKG without signs of ischemia and troponin negative. A TTE was done which showed no new WMA and preserved function as well as signs of hypovolemia. On exam it was noted  that chest pain was reproducible upon palpation supporting MSK in nature. He was started on IVF with improvement in his kidney function and also given a unit of blood. I suspect his presentation was multifactorial from anemia, jeronimo and hypovolemia in the setting of decreased PO intake. As he is immunosuppressed fungal studies were sent but given his overall stability these can be followed up on as an outpatient.     Cough, ROONEY and pleuritic chest pain   Stable pulmonary nodules  Acute onset x2 days. No associated fevers, and patient afebrile. Patient reports an exertional component of CP, but EKG reassuring and troponin negative x2. Chest pain reproducible on exam. CXR w/ likely atelectasis. COVID 19 swab negative as well as respiratory viral panel. Chest pain seems MSK in nature given exam and dyspnea likely due to anemia and deconditioning. However, given he is immunosuppressed and his constellation of symptoms including new anorexia, cough and dyspnea fungal serologies were sent as well. Day after admission patient feeling quite well and feels like chest pain has improved and he was ready to go home. Given his stability and lack of infectious symptoms it was felt he could discharge with attention to the pending studies on follow up.  - Follow up histo, blasto, cocci, and BD glucan on discharge      Acute on chronic anemia, macrocytic  Patient with anemia since liver tx, possibly thought to be due to Imuran, which has since been discontinued. Iron studies 11/2021, with normal iron, elevated ferritin, and low TIBC. No recent B12. Now thought to be due to anemia of chronic disease from CKD, receiving IVELISSE every 2 weeks. Hgb 6.9 on admission, but also noted to be 6.9 on 1/5/22 on infusion clinic visit. Denies any blood loss, melena, or hematochezia. S/p 2 units of RBC with improvement in Hgb.  - Continue outpatient epo      JERONIMO on CKD stage III   baseline Cr 1.6-1.8 prior to admission, though over the last week, Cr  peaked at 2.41 and improved with fluids.   - restart lisinopril on discharge      Anorexia   Patient noted lack of appetite for last week. He is uncertain as to why he just has not had a desire to eat. When he eats he has no issues and does not feel nauseas or full easily so unlikely a gastric mass or emptying problem. He has had an elevated BUN lately due to decline in kidney function which is likely playing some part. His BUN and Cr have improved with fluids and I advised the patient to keep his fluid intake up even if he isn't hungry.    Thromboyctopenia  Leukopenia, mild  Likely medication induced     Hx of HCC s/p TACE and microwave ablation 1/2019  ESTRADA cirrhosis c/b PVT/SMA thrombosis   s/p Liver transplant 11/2019  Follows with Dr. Gaviria, with recent screening negative for recurrent HCC. Patient endorsing new RUQ abdominal pain over the last several days. Prior US liver with surgically absent gallbladder with patent vasculature on 12/2019. Liver transplant US WNL. Seen by hepatology who made no adjustments.  - Continue IS:  mg BID, and prednisone 5 mg daily   - Lamivudine for Hep B core + donor ppx      CAD s/p 2 vessel CABG to LAD and OM  HTN   HLD   Follow[facility]ology at [facility] following recent CABG. After CABG, patient with ischemic cardiomyopathy at with EF at 45 % with WMA. On presentation patient reporting atypical chest pain. EKG without any acute ischemic changes. TTE on 1/11/22 with improvement in EF to 55-60%, and improvement of WMA. Troponin negative x2 and TTE unchanged from prior.  - Restarted PTA meds on discharge      IDDM2  Hemoglobin A1c 6.0 on 1/10/22. Uses continuous glucose monitor. Follows with endocrine. Reports elevated BGs over the last several days in 200s.   - restarted PTA diabetes medications on discharge      Depression, Bipolar disorder - Continue PTA Abilify 5 mg daily     Consultations This Hospital Stay   CARE MANAGEMENT / SOCIAL WORK IP CONSULT  GI HEPATOLOGY  ADULT IP CONSULT  CARE MANAGEMENT / SOCIAL WORK IP CONSULT  VASCULAR ACCESS CARE ADULT IP CONSULT    Code Status   Full Code    Time Spent on this Encounter   I, Harris Dickerson DO, personally saw the patient today and spent greater than 30 minutes discharging this patient.       Harris Dickerson DO  [facility] [address] [address]  Phone: [phone]  ______________________________________________________________________    Physical Exam   Vital Signs: Temp: 98.2  F (36.8  C) Temp src: Oral BP: 122/66 Pulse: 87   Resp: 18 SpO2: 99 % O2 Device: None (Room air)    Weight: 156 lbs 1.6 oz    GENERAL: Alert and awake. Answering questions appropriately. NAD. Pleasant and conversational   CARDIOVASCULAR: RRR. S1, S2. No murmurs, rubs, or gallops.    RESPIRATORY: Effort normal on RA. Clear to auscultation bilaterally, no rales, rhonchi or wheezes, respirations unlabored   GI: Abdomen soft, TTP in RLQ without rebound or guarding  EXTREMITIES: No peripheral edema.  No calf asymmetry, erythema, or tenderness.   NEUROLOGICAL:  CN II-XII grossly intact. Moving all extremities symmetrically.   SKIN: Intact. Warm and dry. No jaundice. Pale.   PSYCH: Affect appropriate        Primary Care Physician   Mohamud Tavarez    Discharge Orders      Reason for your hospital stay    You were in the hospital for dyspnea and chest pain. Your chest pain was non cardiac in nature and likely due to inflammation in the cartilage of your chest wall. For your shortness of breath labs are being run but will take time to return and can be followed up as an outpatient.     Activity    Your activity upon discharge: activity as tolerated     Adult [facility] Follow-up and recommended labs and tests    Follow up with primary care provider, Mohamud Tavarez, within 7 days for hospital follow- up and to follow up on results.  Patient had fungal serologies drawn given his immunosuppression. I am not sure when  these will return but if they are positive he would benefit from a referral to ID or pulm.     Appointments on [place] and/or [facility] (with [facility] or [facility] provider or service). Call [phone] if you haven't heard regarding these appointments within 7 days of discharge.     Diet    Follow this diet upon discharge: Orders Placed This Encounter      Regular Diet Adult       Significant Results and Procedures   Most Recent 3 CBC's:  Recent Labs   Lab Test 01/13/22 0724 01/12/22 0711 01/11/22 2200   WBC 3.1* 3.5* 3.4*   HGB 7.7* 7.9* 7.0*   MCV 96 98 99   * 149* 153     Most Recent 3 BMP's:  Recent Labs   Lab Test 01/13/22 0724 01/13/22  0203 01/12/22 2206 01/12/22  1211 01/12/22  0711 01/11/22 2200     --   --   --  140 137   POTASSIUM 4.2  --   --   --  4.6 5.0   CHLORIDE 111*  --   --   --  112* 109   CO2 21  --   --   --  18* 20   BUN 45*  --   --   --  60* 68*   CR 1.35*  --   --   --  1.72* 2.03*   ANIONGAP 8  --   --   --  10 8   DEBORAH 9.7  --   --   --  9.5 9.7   * 107* 157*   < > 122* 156*    < > = values in this interval not displayed.     Most Recent 2 LFT's:  Recent Labs   Lab Test 01/12/22 0711 01/11/22 2200   AST 8 12   ALT 14 15   ALKPHOS 98 98   BILITOTAL 0.5 0.3     Most Recent 3 INR's:  Recent Labs   Lab Test 01/11/22  2200 07/14/21  1351 07/14/21  1215   INR 1.13 1.28* 1.45*   ,   Results for orders placed or performed during the hospital encounter of 01/10/22   XR Chest Port 1 View    Narrative    EXAM: XR CHEST PORT 1 VIEW  LOCATION: [facility]  DATE/TIME: 1/10/2022 11:41 PM    INDICATION: cp  COMPARISON: 07/20/2021      Impression    IMPRESSION: Multiple median sternotomy wires. Borderline enlarged heart. No pneumothorax or pleural effusion. No focal consolidation. No acute osseous abnormality.   XR Chest Port 1 View    Narrative    Exam: XR CHEST PORT 1 VIEW, 1/12/2022 9:19 AM    Indication: SOB,  cough    Comparison: 1/10/2022    Findings:   Intact median sternotomy wires. Mediastinal surgical clips. Stable  cardiomediastinal silhouette. The pulmonary vasculature is within  normal limits. No appreciable pleural effusion or pneumothorax. Stable  linear and streaky bibasilar opacities.      Impression    Impression: Stable mild streaky bibasilar opacities, favored to  represent atelectasis. No new focal airspace opacity.    HAMLET MARTIN DO         SYSTEM ID:  [id number]   US Liver Transplant    Narrative    EXAMINATION: US LIVER TRANSPLANT, 1/12/2022 9:10 AM    COMPARISON: CT 11/26/2021, MR 9/25/2020, liver ultrasound 11/14/2019    HISTORY: RUQ abdominal pain with Hx of Liver tx.  JERONIMO, rule out  hydronephrosis    TECHNIQUE: The abdomen was scanned in standard fashion with  specialized ultrasound transducer(s) using both grey scale and limited  color Doppler techniques.    Findings:    Liver: Hepatic parenchyma is of normal echogenicity without evidence  for focal mass. Main portal vein is patent with antegrade flow  measuring cm in diameter.    Gallbladder: Gallbladder is surgically absent. Both the intra and  extrahepatic biliary system are of normal caliber with the common duct  measuring 4 mm in diameter.    Pancreas: Not well-visualized.    Kidneys: Both kidneys are of normal echotexture, without mass nor  hydronephrosis. The craniocaudal dimensions are: right- 10.8 cm, left-  11.7 cm.    Urinary bladder: Unremarkable.    Spleen: The spleen is unremarkable and measures 14 cm in sagittal  dimension.    Fluid: No free fluid.    DOPPLER:  Doppler evaluation shows flow towards the liver in the  portal venous system.     Flow is   55 cm/sec in the extrahepatic native portal vein  59 cm/sec at the portal vein anastomosis  33 cm/sec in the intrahepatic transplant portal vein.     Flow is:  17 cm/sec in the leftward of the branch portal vein  26 cm/sec in the rightward branch of the portal vein  both flow towards  the liver.     Flow in the hepatic artery is towards the liver and   47 cm/sec peak systolic  20 cm/sec minimum diastolic  0.58 resistive index.     The splenic vein is patent and flow is towards the liver. The left,  middle, and right hepatic veins are patent with flow towards the IVC.  The IVC is patent with flow towards the heart.  Aorta is not dilated.      Impression    Impression:   1. Normal grayscale and Doppler evaluation of the liver transplant.  2. No hydronephrosis.  3. Mild hepatomegaly.    I have personally reviewed the examination and initial interpretation  and I agree with the findings.    JUAN DAVID LOCKHART MD         SYSTEM ID:  [id number]   Echocardiogram Complete     Value    LVEF  55-60%    Narrative    [id number]  [id number]  [id number]  [id number]^JESSICA^MILKA^VIJAY     [facility],[place]  Echocardiography Laboratory  [address] [place] [id number]     Name: DAVID SANDERS  MRN: [id number]  : 1965  Study Date: 2022 07:45 AM  Age: 56 yrs  Gender: Male  Patient [facility]: [facility]  Reason For Study: Dyspnea  Ordering Physician: MILKA LOPEZ  Performed By: Lili Edwards     BSA: 1.9 m2  Height: 69 in  Weight: 157 lb  ______________________________________________________________________________  Procedure  Complete Portable Echo Adult. Contrast Optison. Optison (NDC #0997-5133-96)  given intravenously. Patient was given 6 ml mixture of 3 ml Optison and 6 ml  saline. 3 ml wasted.  ______________________________________________________________________________  Interpretation Summary  Left ventricular function is normal.The ejection fraction is 55-60%. Distal  septal hypokinesis is present  Global right ventricular function is mildly reduced.  No significant valvular abnormalities present.  The aortic root is mildly dilated at 4.0 cm.  No pericardial effusion is present.  ______________________________________________________________________________  Left  Ventricle  Left ventricular wall thickness is normal. Left ventricular function is  normal.The ejection fraction is 55-60%. Left ventricular diastolic function is  normal. Distal septal hypokinesis is present.     Right Ventricle  The right ventricle is normal size. Global right ventricular function is  mildly reduced.     Atria  Both atria appear normal.     Mitral Valve  The mitral valve is normal.     Aortic Valve  Aortic valve is normal in structure and function. The aortic valve is  tricuspid.     Tricuspid Valve  The tricuspid valve is normal. Trace tricuspid insufficiency is present.     Pulmonic Valve  The pulmonic valve is normal.     Vessels  Sinuses of Valsalva 4.0 cm. Ascending aorta 3.3 cm. Small inferior vena cava  size consistent with hypovolemia.     Pericardium  No pericardial effusion is present.     Miscellaneous  No significant valvular abnormalities present.     Compared to Previous Study  This study was compared with the study from 21 . When compared with  prior, regional wall motion abnormalities previously seen in the mid  anteroseptal, distal septal and apex are improved.     Attestation  I have personally viewed the imaging and agree with the interpretation and  report as documented by the fellow, Dahlia Lomax, and/or edited by me.     ______________________________________________________________________________  MMode/2D Measurements & Calculations  IVSd: 0.92 cm  LVIDd: 3.9 cm  LVIDs: 2.6 cm  LVPWd: 1.0 cm  FS: 33.5 %  LV mass(C)d: 119.8 grams  LV mass(C)dI: 64.3 grams/m2  Ao root diam: 4.0 cm  asc Aorta Diam: 3.3 cm  LVOT diam: 2.5 cm  LVOT area: 5.0 cm2     EF(MOD-bp): 54.9 %     LA Volume (BP): 49.0 ml  LA Volume Index (BP): 26.3 ml/m2  RWT: 0.53     Doppler Measurements & Calculations  MV E max vivian: 80.5 cm/sec  MV A max vivian: 95.8 cm/sec  MV E/A: 0.84  MV dec time: 0.15 sec  TR max vivian: 244.3 cm/sec  TR max P.9 mmHg  RVSP(TR): 33.9 mmHg  E/E' av.6  Lateral E/e':  6.9     [facility] E/e': 10.3     ______________________________________________________________________________  Report approved by: Radha Gomez 01/11/2022 10:44 AM           *Note: Due to a large number of results and/or encounters for the requested time period, some results have not been displayed. A complete set of results can be found in Results Review.       Discharge Medications   Current Discharge Medication List      CONTINUE these medications which have NOT CHANGED    Details   ARIPiprazole (ABILIFY) 5 MG tablet daily      aspirin (SM ASPIRIN ADULT LOW STRENGTH) 81 MG EC tablet Take 2 tablets (162 mg) by mouth daily  Qty: 180 tablet, Refills: 1    Associated Diagnoses: S/P CABG (coronary artery bypass graft)      cyanocobalamin (VITAMIN B-12) 1000 MCG tablet TAKE 1 TABLET (1,000 MCG) BY MOUTH DAILY  Qty: 30 tablet, Refills: 11    Associated Diagnoses: Liver transplant recipient (H)      empagliflozin (JARDIANCE) 10 MG TABS tablet Take 1 tablet (10 mg) by mouth daily  Qty: 30 tablet, Refills: 11    Associated Diagnoses: Type 2 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, unspecified whether long term insulin use (H); Stage 3b chronic kidney disease (H); HFrEF (heart failure with reduced ejection fraction) (H); Persistent proteinuria      insulin aspart (NOVOLOG PEN) 100 UNIT/ML pen Inject 1-5 Units Subcutaneous At Bedtime MEDIUM INSULIN RESISTANCE DOSING  Do Not give Bedtime Correction Insulin if BG less than  200. For  - 249 give 1 units. For  - 299 give 2 units. For  - 349 give 3 units. For  -399 give 4 units. For BG greater than or equal to 400 give 5 units. Notify provider if glucose greater than or equal to 350 mg/dL after administration of correction dose. If given at mealtime, administer within 30 minutes of start of meal. Approx daily dose 5 units.  Qty: 15 mL, Refills: 0    Associated Diagnoses: S/P CABG (coronary artery  bypass graft)      insulin degludec (TRESIBA FLEXTOUCH) 100 UNIT/ML pen Inject 26 units subcutaneous once daily  Qty: 25 mL, Refills: 3    Associated Diagnoses: Type 2 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, unspecified whether long term insulin use (H)      lamiVUDine (EPIVIR) 100 MG tablet Take 1 tablet (100 mg) by mouth daily  Qty: 90 tablet, Refills: 3    Associated Diagnoses: Status post liver transplantation (H); Hepatitis B      lisinopril (ZESTRIL) 5 MG tablet Take 1 tablet (5 mg) by mouth daily  Qty: 30 tablet, Refills: 11    Associated Diagnoses: Type 2 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, unspecified whether long term insulin use (H); Stage 3b chronic kidney disease (H); HFrEF (heart failure with reduced ejection fraction) (H); Persistent proteinuria      methocarbamol (ROBAXIN) 750 MG tablet Take 1 tablet (750 mg) by mouth 3 times daily as needed for muscle spasms (sternal pain)  Qty: 60 tablet, Refills: 0    Associated Diagnoses: S/P CABG (coronary artery bypass graft)      metoprolol succinate ER (TOPROL-XL) 25 MG 24 hr tablet Take 0.5 tablets (12.5 mg) by mouth daily  Qty: 60 tablet, Refills: 3    Associated Diagnoses: S/P CABG (coronary artery bypass graft)      pantoprazole (PROTONIX) 40 MG EC tablet Take 1 tablet (40 mg) by mouth daily before breakfast  Qty: 90 tablet, Refills: 3    Associated Diagnoses: Liver transplant recipient (H)      rosuvastatin (CRESTOR) 5 MG tablet Take 1 tablet (5 mg) by mouth daily  Qty: 90 tablet, Refills: 3    Associated Diagnoses: Coronary artery disease involving native coronary artery of native heart without angina pectoris      tamsulosin (FLOMAX) 0.4 MG capsule Take 1 capsule (0.4 mg) by mouth daily  Qty: 90 capsule, Refills: 3    Associated Diagnoses: Benign prostatic hyperplasia with lower urinary tract symptoms, symptom details unspecified      vitamin  D3 (CHOLECALCIFEROL) 50 mcg (2000 units) tablet Take 1 tablet (50 mcg) by mouth daily  Qty: 90 tablet, Refills: 3    Associated Diagnoses: Liver transplant recipient (H)      Acetaminophen (TYLENOL) 325 MG CAPS Take 325-650 mg by mouth every 4 hours as needed (pain, fever)  Qty: 100 capsule, Refills: 1    Associated Diagnoses: Liver transplant recipient (H)      BD VIKTORIA U/F 32G X 4 MM insulin pen needle Use 5 per day  Qty: 300 each, Refills: 3    Associated Diagnoses: Type 2 diabetes mellitus without complication, with long-term current use of insulin (H)      ciclopirox (LOPROX) 0.77 % cream Apply topically 2 times daily To feet and toenails.  Qty: 90 g, Refills: 5    Associated Diagnoses: Tinea pedis of both feet      Continuous Blood Gluc  (FREESTYLE CLAUDIA 14 DAY READER) CECILIO Use to read blood sugars as per 's instructions.  Qty: 1 each, Refills: 0    Associated Diagnoses: Type 2 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, unspecified whether long term insulin use (H)      Continuous Blood Gluc Sensor (FREESTYLE CLAUDIA 2 SENSOR) MISC 1 each See Admin Instructions Change every 14 days.  Qty: 2 each, Refills: 5    Associated Diagnoses: Type 2 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, unspecified whether long term insulin use (H)      Multiple Vitamin (TAB-A-GLADIS) TABS Take 1 tablet by mouth daily  Qty: 90 tablet, Refills: 3    Associated Diagnoses: Chronic kidney disease, stage 3a (H)      mycophenolate (GENERIC EQUIVALENT) 250 MG capsule Take 3 capsules (750 mg) by mouth every 12 hours  Qty: 180 capsule, Refills: 11    Comments: TXP DT 11/11/2019 (Liver) TXP Dischg DT 11/20/2019 DX Liver replaced by transplant Z94.4 TX Center [facility] ([place], [place])  Associated Diagnoses: Status post liver transplantation (H)      order for DME 1 Device by Device route  daily Knee high compression socks 15-20 mmhg.  Qty: 1 Device, Refills: 0    Associated Diagnoses: Peripheral edema      oxyCODONE (ROXICODONE) 5 MG tablet Take 1 tablet (5 mg) by mouth every 4 hours as needed for moderate to severe pain  Qty: 20 tablet, Refills: 0    Associated Diagnoses: S/P CABG (coronary artery bypass graft)      polyethylene glycol (MIRALAX) 17 g packet Take 1 packet by mouth daily      predniSONE (DELTASONE) 5 MG tablet TAKE ONE TABLET BY MOUTH ONCE DAILY  Qty: 90 tablet, Refills: 3    Associated Diagnoses: Status post liver transplantation (H)      senna-docusate (SENOKOT-S/PERICOLACE) 8.6-50 MG tablet Take 1 tablet by mouth 2 times daily as needed for constipation  Qty: 50 tablet, Refills: 0    Associated Diagnoses: S/P CABG (coronary artery bypass graft)           Allergies   Allergies   Allergen Reactions     Codeine Other (See Comments)     Cannot take due to liver  Cannot tolerate oral narcotics     Seasonal Allergies      Sneezing, coughing, runny and itchy eyes

## 2022-01-14 LAB
1,3 BETA GLUCAN SER-MCNC: <31 PG/ML
OBSERVATION IMP: NEGATIVE
SCANNED LAB RESULT: NORMAL

## 2022-01-14 NOTE — PROGRESS NOTES
Patient discharged to home. Personal belonging taken at the time of discharge. Peripheral IV removed prior to discharge. Patient walked out from the unit independently.

## 2022-01-15 LAB
H CAPSUL AG UR QL IA: NOT DETECTED
H CAPSUL AG UR-MCNC: NOT DETECTED NG/ML

## 2022-01-16 LAB
BACTERIA SPT CULT: ABNORMAL
GRAM STAIN RESULT: ABNORMAL

## 2022-01-17 LAB — SCANNED LAB RESULT: NORMAL

## 2022-01-18 ENCOUNTER — VIRTUAL VISIT (OUTPATIENT)
Dept: BEHAVIORAL HEALTH | Facility: CLINIC | Age: 57
End: 2022-01-18
Payer: MEDICARE

## 2022-01-18 DIAGNOSIS — Z53.9 ERRONEOUS ENCOUNTER--DISREGARD: Primary | ICD-10-CM

## 2022-01-18 NOTE — PROGRESS NOTES
.  Appointment cancelled. Talked briefly. Miller reports not feeling well, spent last week in hospital. Prefers to reschedule for later time.     Joseph Murillo Psy.D, LP   Behavioral Health Clinician   -[facility]

## 2022-01-19 ENCOUNTER — TELEPHONE (OUTPATIENT)
Dept: PHARMACY | Facility: CLINIC | Age: 57
End: 2022-01-19
Payer: MEDICARE

## 2022-01-19 NOTE — TELEPHONE ENCOUNTER
Follow-up with anemia management service:    Spoke with Miller.  He is feeling better, not at his baseline.  [place] [facility] [place]anesp.  Reviewed with Miller that the Aranesp orders were changed to weekly, Her verbalized understanding.     Anemia Latest Ref Rng & Units 11/30/2021 12/22/2021 1/5/2022 1/10/2022 1/11/2022 1/12/2022 1/13/2022   IVELISSE Dose - 60 mcg 60 mcg 100 mcg - - - -   Hemoglobin 13.3 - 17.7 g/dL 9.3(L) 8.0(L) 6.9(LL) 6.9(LL) 7.0(L) 7.9(L) 7.7(L)   TSAT 15 - 46 % - - - - - - -   Ferritin 26 - 388 ng/mL - - - - - - -   PRBCs - - - - - 1 unit  - -           Follow-up call date: 1/26/22    Jie Blum RN   Anemia Services  [address] [address]   [email or web address]   Office : [phone]  Fax: [phone]

## 2022-01-20 ENCOUNTER — OFFICE VISIT (OUTPATIENT)
Dept: OPHTHALMOLOGY | Facility: CLINIC | Age: 57
End: 2022-01-20
Attending: OPHTHALMOLOGY
Payer: MEDICARE

## 2022-01-20 DIAGNOSIS — E11.3313 TYPE 2 DIABETES MELLITUS WITH MODERATE NONPROLIFERATIVE RETINOPATHY OF BOTH EYES AND MACULAR EDEMA, UNSPECIFIED WHETHER LONG TERM INSULIN USE (H): Primary | ICD-10-CM

## 2022-01-20 PROCEDURE — G0463 HOSPITAL OUTPT CLINIC VISIT: HCPCS | Mod: 25

## 2022-01-20 PROCEDURE — 92012 INTRM OPH EXAM EST PATIENT: CPT | Performed by: OPHTHALMOLOGY

## 2022-01-20 PROCEDURE — 92134 CPTRZ OPH DX IMG PST SGM RTA: CPT | Performed by: OPHTHALMOLOGY

## 2022-01-20 ASSESSMENT — REFRACTION_WEARINGRX
OD_AXIS: 131
OD_SPHERE: -7.00
OS_CYLINDER: +0.50
OS_AXIS: 044
OD_CYLINDER: +0.75
OS_SPHERE: -6.75
SPECS_TYPE: PAL
OS_ADD: +2.00
OD_ADD: +2.00

## 2022-01-20 ASSESSMENT — VISUAL ACUITY
OS_CC: 20/30
OD_CC: 20/30
OD_CC+: -1
METHOD: SNELLEN - LINEAR
OS_CC+: -2
CORRECTION_TYPE: GLASSES

## 2022-01-20 ASSESSMENT — SLIT LAMP EXAM - LIDS
COMMENTS: SMALL PAPILLOMA ALONG LL MARGIN
COMMENTS: NORMAL

## 2022-01-20 ASSESSMENT — TONOMETRY
OS_IOP_MMHG: 11
OD_IOP_MMHG: 12
IOP_METHOD: TONOPEN

## 2022-01-20 ASSESSMENT — CUP TO DISC RATIO
OD_RATIO: 0.25
OS_RATIO: 0.3

## 2022-01-20 ASSESSMENT — EXTERNAL EXAM - LEFT EYE: OS_EXAM: NORMAL

## 2022-01-20 ASSESSMENT — CONF VISUAL FIELD
OS_NORMAL: 1
OD_NORMAL: 1

## 2022-01-20 ASSESSMENT — EXTERNAL EXAM - RIGHT EYE: OD_EXAM: NORMAL

## 2022-01-20 NOTE — NURSING NOTE
Chief Complaints and History of Present Illnesses   Patient presents with     Diabetic Macular Edema Follow Up     Chief Complaint(s) and History of Present Illness(es)     Diabetic Macular Edema Follow Up     Laterality: both eyes    Onset: 9 months ago              Comments     Pt. States that he has had a lot of dryness BE. Has had a cold for the last 2 weeks. Has had some 'sticky' discharge from BE. VA has been fluctuating BE. No new flashes or floaters BE.   Yvonne Dickinson COT 9:20 AM January 20, 2022

## 2022-01-20 NOTE — PROGRESS NOTES
CC:  Follow up Diabetic macular edema      HPI: Frandy Workman is a  56 year old year-old patient with history of Diabetes mellitus for 24 ys . Patient on insulin.   Interval History: Vision is stable. Eyes feel itchy, maybe having allergies.   Missed yuval because of  Had a heart attack in July, 2021    Hemoglobin A1C POCT   Date Value Ref Range Status   12/30/2020 6.0 (H) 0 - 5.6 % Final     Comment:     Normal <5.7% Prediabetes 5.7-6.4%  Diabetes 6.5% or higher - adopted from ADA   consensus guidelines.       Hemoglobin A1C   Date Value Ref Range Status   01/10/2022 6.0 (H) 0.0 - 5.6 % Final     Comment:     Normal <5.7%   Prediabetes 5.7-6.4%    Diabetes 6.5% or higher     Note: Adopted from ADA consensus guidelines.     Retinal Imaging:  OCT 01/20/22   RE: resolved Diabetic macular edema. Vitreous attached  LE: resolved Diabetic macular edema, mild Epiretinal membrane; trace cystic changes     fluorescein angiography transits right eye 11-25-20  Right eye: diffuse microaneurysms, late mild macular leakage; peripheral peripheral capillary non perfusion;   vessel leakage in late phase, no NVD/NVE vs early SN neovascularization elsewhere??  Left eye: diffuse microaneurysms, late mild macula leakage; mild peripheral capillary non perfusion;  mild vessel leakage in late phase, no NVD/NVE    Optos consistent with clinical exam    Assessment & Plan:  # Diabetic macular edema both eyes    - status post avastin x 4. Switch to Eylea 4/24/19 with improvement of Diabetic macular edema    - now with resolved Diabetic macular edema both eyes    - Last Eylea injection was 5/2021 both eyes    - plan to observe    # severe nonproliferative diabetic retinopathy of both eyes    - HbA1c 6.3% (6/2020)   - fluorescein angiography with increased peripheral leakage and capillary non perfusion compared to march, 2019   - Blood pressure (<120/80) and blood glucose (HbA1c <7.0, ~6.5 today) control discussed with patient. Patient  advised that failure to adequately control each may lead to vision loss. The patient expressed understanding.   - recommend fluorescein angiography    - patient prefers to return for fluorescein angiography      # Myopia, bilateral  Prescription given today (10/9/19)     #. Senile nuclear sclerosis, bilateral  becoming visually significant both eyes  Patient on flomax   Patient considering Cataract extraction intraocular lens this yr     # Dry eye syndrome   Left eye worse than right eye   warm compresses and artificial tears  As needed  -punctal plugs previously left eye - reports this is better     # Status post liver transplant   - tx 11/2019   - severe anemia; s/p transfusion - anemia much improved    - being seen by his primary team    Plan: follow up in the next 2 months,  With fluorescein angiography transits right eye     ~~~~~~~~~~~~~~~~~~~~~~~~~~~~~~~~~~   Complete documentation of historical and exam elements from today's encounter can be found in the full encounter summary report (not reduplicated in this progress note).  I personally obtained the chief complaint(s) and history of present illness.  I confirmed and edited as necessary the review of systems, past medical/surgical history, family history, social history, and examination findings as documented by others; and I examined the patient myself.  I personally reviewed the relevant tests, images, and reports as documented above.  I formulated and edited as necessary the assessment and plan and discussed the findings and management plan with the patient and family    Enriqueta Martin MD   of Ophthalmology.  Retina Service   Department of Ophthalmology and Visual Neurosciences   [facility]  Phone: [phone]   Fax: [phone]

## 2022-01-26 ENCOUNTER — TELEPHONE (OUTPATIENT)
Dept: PHARMACY | Facility: CLINIC | Age: 57
End: 2022-01-26
Payer: MEDICARE

## 2022-01-26 ENCOUNTER — TELEPHONE (OUTPATIENT)
Dept: TRANSPLANT | Facility: CLINIC | Age: 57
End: 2022-01-26
Payer: MEDICARE

## 2022-01-26 DIAGNOSIS — Z13.220 LIPID SCREENING: ICD-10-CM

## 2022-01-26 DIAGNOSIS — Z94.4 LIVER REPLACED BY TRANSPLANT (H): ICD-10-CM

## 2022-01-26 NOTE — TELEPHONE ENCOUNTER
Spoke to Miller who states that his  was positive for Covid and he was exposed to her this past Sunday. Today he woke up with a headache. Suggested that Miller get tested for Covid through testing sites such as [facility], [facility]. If positive, gave pt the number to [facility] which is [phone] to determine if he is eligible for for the monoclonal antibodies.

## 2022-01-26 NOTE — TELEPHONE ENCOUNTER
Follow-up with anemia management service:    Miller called.  He was exposed to COVID on 1/23/22.  He woke up with a headache today.  He canceled his lab/Aranesp appt. For today. Instructed Miller that he should be tested, and if he is + he needs to hold off on Aranesp x 4 weeks.     Message sent to Radha (nurse coord) re ordering a COVID test.     Anemia Latest Ref Rng & Units 11/30/2021 12/22/2021 1/5/2022 1/10/2022 1/11/2022 1/12/2022 1/13/2022   IVELISSE Dose - 60 mcg 60 mcg 100 mcg - - - -   Hemoglobin 13.3 - 17.7 g/dL 9.3(L) 8.0(L) 6.9(LL) 6.9(LL) 7.0(L) 7.9(L) 7.7(L)   TSAT 15 - 46 % - - - - - - -   Ferritin 26 - 388 ng/mL - - - - - - -   PRBCs - - - - - 1 unit  - -           Follow-up call date: 1/27/22  Was a COVID test ordered?     Jie Blum RN   Anemia Services  [address] [address]   [email or web address]   Office : [phone]  Fax: [phone]

## 2022-01-27 NOTE — TELEPHONE ENCOUNTER
Miller will get a COVID test at his local pharmacy.  Will follow up in a couple of days for results.     Jie Blum RN   [facility]  [address] [address]   [email or web address]   Office : [phone]  Fax: [phone]

## 2022-01-28 ENCOUNTER — TELEPHONE (OUTPATIENT)
Dept: GASTROENTEROLOGY | Facility: CLINIC | Age: 57
End: 2022-01-28
Payer: MEDICARE

## 2022-01-29 ENCOUNTER — PATIENT OUTREACH (OUTPATIENT)
Dept: CARDIOLOGY | Facility: CLINIC | Age: 57
End: 2022-01-29
Payer: MEDICARE

## 2022-01-29 NOTE — TELEPHONE ENCOUNTER
Attempted to reach patient to schedule follow up in the Cardiovascular Clinic.  ROSARIO and Jordan message sent.     Schedule with Dr. Manjeet Ireland with labs and echo prior to appointment.

## 2022-01-31 ENCOUNTER — TELEPHONE (OUTPATIENT)
Dept: PHARMACY | Facility: CLINIC | Age: 57
End: 2022-01-31
Payer: MEDICARE

## 2022-01-31 NOTE — TELEPHONE ENCOUNTER
Follow-up with anemia management service:    COVID test was Negative.  Miller is pretty tired. Encouraged him to call the [facility] today and schedule labs and Aranesp.     Anemia Latest Ref Rng & Units 11/30/2021 12/22/2021 1/5/2022 1/10/2022 1/11/2022 1/12/2022 1/13/2022   IVELISSE Dose - 60 mcg 60 mcg 100 mcg - - - -   Hemoglobin 13.3 - 17.7 g/dL 9.3(L) 8.0(L) 6.9(LL) 6.9(LL) 7.0(L) 7.9(L) 7.7(L)   TSAT 15 - 46 % - - - - - - -   Ferritin 26 - 388 ng/mL - - - - - - -   PRBCs - - - - - 1 unit  - -         Follow-up call date: 2/2/22    Jie Blum RN   Anemia Services  [address] [place] [address]   [email or web address]   Office : [phone]  Fax: [phone]

## 2022-02-03 ENCOUNTER — ALLIED HEALTH/NURSE VISIT (OUTPATIENT)
Dept: TRANSPLANT | Facility: CLINIC | Age: 57
End: 2022-02-03
Attending: INTERNAL MEDICINE
Payer: MEDICARE

## 2022-02-03 ENCOUNTER — TELEPHONE (OUTPATIENT)
Dept: TRANSPLANT | Facility: CLINIC | Age: 57
End: 2022-02-03

## 2022-02-03 ENCOUNTER — LAB (OUTPATIENT)
Dept: LAB | Facility: CLINIC | Age: 57
End: 2022-02-03
Attending: INTERNAL MEDICINE
Payer: MEDICARE

## 2022-02-03 ENCOUNTER — TELEPHONE (OUTPATIENT)
Dept: PHARMACY | Facility: CLINIC | Age: 57
End: 2022-02-03

## 2022-02-03 VITALS — DIASTOLIC BLOOD PRESSURE: 52 MMHG | SYSTOLIC BLOOD PRESSURE: 95 MMHG

## 2022-02-03 DIAGNOSIS — N18.32 STAGE 3B CHRONIC KIDNEY DISEASE (H): Primary | ICD-10-CM

## 2022-02-03 DIAGNOSIS — Z94.4 STATUS POST LIVER TRANSPLANTATION (H): Primary | ICD-10-CM

## 2022-02-03 DIAGNOSIS — Z94.4 LIVER REPLACED BY TRANSPLANT (H): ICD-10-CM

## 2022-02-03 DIAGNOSIS — I25.10 CORONARY ARTERY DISEASE INVOLVING NATIVE CORONARY ARTERY OF NATIVE HEART WITHOUT ANGINA PECTORIS: ICD-10-CM

## 2022-02-03 DIAGNOSIS — D63.1 ANEMIA OF CHRONIC RENAL FAILURE, STAGE 3B (H): ICD-10-CM

## 2022-02-03 DIAGNOSIS — N18.32 STAGE 3B CHRONIC KIDNEY DISEASE (H): ICD-10-CM

## 2022-02-03 DIAGNOSIS — N18.32 ANEMIA OF CHRONIC RENAL FAILURE, STAGE 3B (H): ICD-10-CM

## 2022-02-03 DIAGNOSIS — Z13.220 LIPID SCREENING: ICD-10-CM

## 2022-02-03 LAB
ABO/RH(D): NORMAL
ALBUMIN SERPL-MCNC: 3.8 G/DL (ref 3.4–5)
ALP SERPL-CCNC: 99 U/L (ref 40–150)
ALT SERPL W P-5'-P-CCNC: 20 U/L (ref 0–70)
ANION GAP SERPL CALCULATED.3IONS-SCNC: 8 MMOL/L (ref 3–14)
ANTIBODY SCREEN: NEGATIVE
AST SERPL W P-5'-P-CCNC: 15 U/L (ref 0–45)
BILIRUB DIRECT SERPL-MCNC: <0.1 MG/DL (ref 0–0.2)
BILIRUB SERPL-MCNC: 0.4 MG/DL (ref 0.2–1.3)
BLD PROD TYP BPU: NORMAL
BLOOD COMPONENT TYPE: NORMAL
BUN SERPL-MCNC: 29 MG/DL (ref 7–30)
CALCIUM SERPL-MCNC: 9 MG/DL (ref 8.5–10.1)
CHLORIDE BLD-SCNC: 105 MMOL/L (ref 94–109)
CO2 SERPL-SCNC: 27 MMOL/L (ref 20–32)
CODING SYSTEM: NORMAL
CREAT SERPL-MCNC: 1.54 MG/DL (ref 0.66–1.25)
CROSSMATCH: NORMAL
ERYTHROCYTE [DISTWIDTH] IN BLOOD BY AUTOMATED COUNT: 18.8 % (ref 10–15)
GFR SERPL CREATININE-BSD FRML MDRD: 52 ML/MIN/1.73M2
GLUCOSE BLD-MCNC: 165 MG/DL (ref 70–99)
HCT VFR BLD AUTO: 21 % (ref 40–53)
HGB BLD-MCNC: 6.3 G/DL (ref 13.3–17.7)
ISSUE DATE AND TIME: NORMAL
MCH RBC QN AUTO: 29.4 PG (ref 26.5–33)
MCHC RBC AUTO-ENTMCNC: 30 G/DL (ref 31.5–36.5)
MCV RBC AUTO: 98 FL (ref 78–100)
PLATELET # BLD AUTO: 208 10E3/UL (ref 150–450)
POTASSIUM BLD-SCNC: 4.4 MMOL/L (ref 3.4–5.3)
PROT SERPL-MCNC: 6.8 G/DL (ref 6.8–8.8)
RBC # BLD AUTO: 2.14 10E6/UL (ref 4.4–5.9)
SODIUM SERPL-SCNC: 140 MMOL/L (ref 133–144)
SPECIMEN EXPIRATION DATE: NORMAL
UNIT ABO/RH: NORMAL
UNIT NUMBER: NORMAL
UNIT STATUS: NORMAL
UNIT TYPE ISBT: 5100
WBC # BLD AUTO: 2.2 10E3/UL (ref 4–11)

## 2022-02-03 PROCEDURE — 86850 RBC ANTIBODY SCREEN: CPT | Performed by: INTERNAL MEDICINE

## 2022-02-03 PROCEDURE — 82248 BILIRUBIN DIRECT: CPT | Performed by: PATHOLOGY

## 2022-02-03 PROCEDURE — 80053 COMPREHEN METABOLIC PANEL: CPT | Performed by: PATHOLOGY

## 2022-02-03 PROCEDURE — 36415 COLL VENOUS BLD VENIPUNCTURE: CPT | Performed by: INTERNAL MEDICINE

## 2022-02-03 PROCEDURE — 85027 COMPLETE CBC AUTOMATED: CPT | Performed by: PATHOLOGY

## 2022-02-03 PROCEDURE — 250N000011 HC RX IP 250 OP 636: Mod: EC | Performed by: INTERNAL MEDICINE

## 2022-02-03 PROCEDURE — 96372 THER/PROPH/DIAG INJ SC/IM: CPT | Performed by: INTERNAL MEDICINE

## 2022-02-03 PROCEDURE — 86923 COMPATIBILITY TEST ELECTRIC: CPT | Performed by: INTERNAL MEDICINE

## 2022-02-03 RX ORDER — HEPARIN SODIUM,PORCINE 10 UNIT/ML
5 VIAL (ML) INTRAVENOUS
Status: CANCELLED | OUTPATIENT
Start: 2022-02-03

## 2022-02-03 RX ORDER — MYCOPHENOLATE MOFETIL 250 MG/1
500 CAPSULE ORAL EVERY 12 HOURS
Qty: 120 CAPSULE | Refills: 11 | Status: SHIPPED | OUTPATIENT
Start: 2022-02-03 | End: 2023-02-14

## 2022-02-03 RX ORDER — HEPARIN SODIUM (PORCINE) LOCK FLUSH IV SOLN 100 UNIT/ML 100 UNIT/ML
5 SOLUTION INTRAVENOUS
Status: CANCELLED | OUTPATIENT
Start: 2022-02-03

## 2022-02-03 RX ADMIN — DARBEPOETIN ALFA 100 MCG: 100 INJECTION, SOLUTION INTRAVENOUS; SUBCUTANEOUS at 09:45

## 2022-02-03 NOTE — TELEPHONE ENCOUNTER
Per dr ashton if patient is not symptomatic he can have blood ordered for the next couple of days.    Pt to decrease cellcept to 500 mg BID.     Left message for patient to return call.

## 2022-02-03 NOTE — TELEPHONE ENCOUNTER
Follow-up with anemia management service:    Received call from [facility]. Miller's Hgb is 6.3 and has a low BP.  They will give him his Aranesp today. We did increase his Aranesp to 100mcg weekly. Received the  100mcg dose on 1/5/22, and then was ill. Missed an Aranesp dose d/t possible COVID.       Spoke with Miller, No shortness of breath, no dizziness, just very tired.  No signs of bleeding per Miller.  He took an Uber there and will go home and wait for Radha to call him.      Anemia Latest Ref Rng & Units 12/22/2021 1/5/2022 1/10/2022 1/11/2022 1/12/2022 1/13/2022 2/3/2022   IVELISSE Dose - 60 mcg 100 mcg - - - - 100 mcg   Hemoglobin 13.3 - 17.7 g/dL 8.0(L) 6.9(LL) 6.9(LL) 7.0(L) 7.9(L) 7.7(L) 6.3(LL)   TSAT 15 - 46 % - - - - - - -   Ferritin 26 - 388 ng/mL - - - - - - -   PRBCs - - - - 1 unit  - - -         Follow-up call date: 2/8/22 Check on Miller, he needs to schedule Aranesp.     Jie Blum RN   Anemia Services  [address] [address]   [email or web address]   Office : [phone]  Fax: [phone]

## 2022-02-03 NOTE — NURSING NOTE
Frequency: 7 days  Most recent or today's HGB: 6.3   Date: 2/3/2022  Date of lat dose: 22  HGB associated with last dose given: 6.9    Blood Pressure:88/56, 95/52    Diagnosis: anemic    Ordered by: TRACE Rao MD  VIS Offered: yes    Double Checked by: Chasity Acosta CMA    See MAR for administration details    Pt's first name, last name and  verified prior to medication administration, injection given without complications or questions.     Spoke with Cristin Blum regarding b/p. Cristin Blum approved the injection for today. Patient will be followed up by AMEYA Moore on 2/3/2022 at 9:45 AM

## 2022-02-03 NOTE — TELEPHONE ENCOUNTER
Spoke with patient. He repeated plan. He will go to er if any new symptoms arise. He currently has been fatigued but has been on going with illness.     Pt repeated cellcept dose change.

## 2022-02-03 NOTE — TELEPHONE ENCOUNTER
DATE:  2/3/2022     TIME OF RECEIPT FROM LAB:  9:21    ORDERING PROVIDER: Vin Dotson    LAB TEST:  HGB    LAB VALUE:  6.3    RESULTS GIVEN WITH READ-BACK TO (PROVIDER):  Radha Ndiaye RN     TIME LAB VALUE REPORTED TO PROVIDER:   9:23

## 2022-02-04 ENCOUNTER — INFUSION THERAPY VISIT (OUTPATIENT)
Dept: INFUSION THERAPY | Facility: CLINIC | Age: 57
End: 2022-02-04
Attending: INTERNAL MEDICINE
Payer: MEDICARE

## 2022-02-04 VITALS
TEMPERATURE: 98.1 F | RESPIRATION RATE: 16 BRPM | OXYGEN SATURATION: 100 % | SYSTOLIC BLOOD PRESSURE: 111 MMHG | HEART RATE: 90 BPM | DIASTOLIC BLOOD PRESSURE: 60 MMHG

## 2022-02-04 DIAGNOSIS — N18.32 ANEMIA OF CHRONIC RENAL FAILURE, STAGE 3B (H): Primary | ICD-10-CM

## 2022-02-04 DIAGNOSIS — D63.1 ANEMIA OF CHRONIC RENAL FAILURE, STAGE 3B (H): Primary | ICD-10-CM

## 2022-02-04 PROCEDURE — P9016 RBC LEUKOCYTES REDUCED: HCPCS

## 2022-02-04 PROCEDURE — 36430 TRANSFUSION BLD/BLD COMPNT: CPT

## 2022-02-04 NOTE — PROGRESS NOTES
Blood Product Transfusion Nursing Note:    Frandy Workman presents today to [facility] for a 1 Unit PRBCs for hgb yesterday of 6.3.  During today's [facility] appointment orders from Dr DINO Dotson were completed.    Progress note:  ID verified by name and .  Assessment completed.  Vitals were monitored throughout time in [facility].    Verbal and printed education given to patient/representative regarding transfusion and possible side effects.  Patient/representative verbalized understanding.     present during visit today: Not Applicable.    All pertinent labs reviewed prior to infusion: YES    Date of consent or authorization: 2022 (0btained phone consent w/ Dr BRYANT Roa and consent sent for scanning).    Patient tolerated the procedure well    Transfusion given over approximately 1.5 hours.     Discharge Plan:    Discharge instructions were reviewed with patient Yes  Patient/representative verbalized understanding of discharge instructions and all questions answered Yes.        Carmelo Melgar RN    BP 98/62 (BP Location: Left arm)   Pulse 84   Temp 97.6  F (36.4  C) (Oral)   Resp 16   SpO2 100%

## 2022-02-04 NOTE — PATIENT INSTRUCTIONS
HOME CARE FOR PATIENTS RECEIVING BLOOD PRODUCTS    You have just received a blood product.  During the next 48 hours please be aware of the following symptoms of a blood product reaction.    The possible symptoms of a blood reaction are:      Chills    Fever temperature above 100.0 orally    Headache    Nausea    Persistent non-productive cough    Hives    Itching    Facial swelling or flushing    Difficulty breathing or wheezing    Bloody urine      If you experience any of these symptoms contact:    [phone]  Emergency Room    [phone]  Transplant Center  7:00 am - 6:30 pm     If you do not live locally you should contact your local physician.

## 2022-02-04 NOTE — LETTER
2022         RE: Frandy Workman  [address] [facility] [address]        Dear Colleague,    Thank you for referring your patient, Frandy Workman, to the [facility]. Please see a copy of my visit note below.    Blood Product Transfusion Nursing Note:    Frandy Workman presents today to [facility] for a 1 Unit PRBCs for hgb yesterday of 6.3.  During today's [facility] appointment orders from Dr DINO Dotson were completed.    Progress note:  ID verified by name and .  Assessment completed.  Vitals were monitored throughout time in [facility].    Verbal and printed education given to patient/representative regarding transfusion and possible side effects.  Patient/representative verbalized understanding.     present during visit today: Not Applicable.    All pertinent labs reviewed prior to infusion: YES    Date of consent or authorization: 2022 (0btained phone consent w/ Dr BRYANT Rao and consent sent for scanning).    Patient tolerated the procedure well    Transfusion given over approximately 1.5 hours.     Discharge Plan:  Discharge instructions were reviewed with patient Yes  Patient/representative verbalized understanding of discharge instructions and all questions answered Yes.  Carmelo Melgar RN    BP 98/62 (BP Location: Left arm)   Pulse 84   Temp 97.6  F (36.4  C) (Oral)   Resp 16   SpO2 100%         Again, thank you for allowing me to participate in the care of your patient.      Sincerely,    [facility]

## 2022-02-08 ENCOUNTER — TELEPHONE (OUTPATIENT)
Dept: PHARMACY | Facility: CLINIC | Age: 57
End: 2022-02-08
Payer: MEDICARE

## 2022-02-08 ENCOUNTER — VIRTUAL VISIT (OUTPATIENT)
Dept: BEHAVIORAL HEALTH | Facility: CLINIC | Age: 57
End: 2022-02-08
Payer: MEDICARE

## 2022-02-08 DIAGNOSIS — F31.31 BIPOLAR 1 DISORDER, DEPRESSED, MILD (H): Primary | ICD-10-CM

## 2022-02-08 PROCEDURE — 90832 PSYTX W PT 30 MINUTES: CPT | Mod: 95 | Performed by: PSYCHOLOGIST

## 2022-02-08 ASSESSMENT — PATIENT HEALTH QUESTIONNAIRE - PHQ9
SUM OF ALL RESPONSES TO PHQ QUESTIONS 1-9: 4
SUM OF ALL RESPONSES TO PHQ QUESTIONS 1-9: 4
10. IF YOU CHECKED OFF ANY PROBLEMS, HOW DIFFICULT HAVE THESE PROBLEMS MADE IT FOR YOU TO DO YOUR WORK, TAKE CARE OF THINGS AT HOME, OR GET ALONG WITH OTHER PEOPLE: SOMEWHAT DIFFICULT

## 2022-02-08 NOTE — LETTER
2/8/2022      RE: Frandy Workman  [address] [address]       [facility]: Integrated Behavioral Health  February 8, 2022      Behavioral Health Clinician Progress Note    Patient Name: Frandy Workman           Service Type: Video visit      Service Location:  Video visit      Session Start Time: 2:04  Session End Time:  2:20      Session Length: 16 - 37      Attendees: Patient    Visit Activities (Refresh list every visit): [facility] Only     Telemedicine Visit: The patient's condition can be safely assessed and treated via synchronous audio and visual telemedicine encounter.      Reason for Telemedicine Visit: COVID-19    Originating Site (Patient Location): Patient's home    Distant Site (Provider Location): Provider Remote Setting    Consent:  The patient/guardian has verbally consented to: the potential risks and benefits of telemedicine (video visit) versus in person care; bill my insurance or make self-payment for services provided; and responsibility for payment of non-covered services.     Mode of Communication:  Video Conference via [employer]    As the provider I attest to compliance with applicable laws and regulations related to telemedicine.    Diagnostic Assessment Date:  12/03/2020  Treatment Plan Review Date:  2/16/2022  See Flowsheets for today's PHQ-9 and LIZZETTE-7 results  Previous PHQ-9:   PHQ-9 SCORE 9/30/2021 1/4/2022 2/8/2022   PHQ-9 Total Score MyChart - 4 (Minimal depression) 4 (Minimal depression)   PHQ-9 Total Score 10 4 4     Previous LIZZETTE-7:   LIZZETTE-7 SCORE 12/29/2020 4/7/2021 9/30/2021   Total Score 8 (mild anxiety) 9 (mild anxiety) -   Total Score 8 9 10       Extended Session (60+ minutes): No  Interactive Complexity: No  Crisis: No    Treatment Objective(s) Addressed in This Session:  Target Behavior(s): disease management/lifestyle changes  related to adaptive approaches to managing anxious distress and mood difficulties      Current  Stressors / Issues:  [facility] met with Miller today for a follow-up. Brief session. Discussed how things are going for him. Reports decline in his health over the last few months with his prognosis unknown. Reports a few stays in the hospital and him receiving three blood transfusions recently. Reports very low energy and unable to do much at home other than sleep and a few basics. Denies depression however, just notes he feels frustrated and exhausted because of his health difficulties. Provided mainly supportive counseling. Discussed his issues with transportation, getting to and from appointments. Discussed consulting with social work services and offered to reach out on his behalf. Miller agreed to this. Encouraged more social support engagement, how to connect with others during difficult times with his health.    Progress on Treatment Objective(s) / Homework:  Stable - MAINTENANCE (Working to maintain change, with risk of relapse); Intervened by continuing to positively reinforce healthy behavior choice       Solution-Focused Therapy    Explore patterns in patient's relationships and discuss options for new behaviors.    Explore patterns in patient's actions and choices and discuss options for new behaviors.    Supportive Psychotherapy      Answers for HPI/ROS submitted by the patient on 2/8/2022  If you checked off any problems, how difficult have these problems made it for you to do your work, take care of things at home, or get along with other people?: Somewhat difficult  PHQ9 TOTAL SCORE: 4      Answers for HPI/ROS submitted by the patient on 4/7/2021   LIZZETTE 7 TOTAL SCORE: 9  If you checked off any problems, how difficult have these problems made it for you to do your work, take care of things at home, or get along with other people?: Very difficult  PHQ9 TOTAL SCORE: 7*    *No SI    Answers for HPI/ROS submitted by the patient on 10/13/2020   If you checked off any problems, how difficult have these problems made it  for you to do your work, take care of things at home, or get along with other people?: Somewhat difficult  PHQ9 TOTAL SCORE: 8*  LIZZETTE 7 TOTAL SCORE: 6      *No SI     Answers for HPI/ROS submitted by the patient on 12/29/2020   If you checked off any problems, how difficult have these problems made it for you to do your work, take care of things at home, or get along with other people?: Somewhat difficult  PHQ9 TOTAL SCORE: 5*  LIZZETTE 7 TOTAL SCORE: 8    *No SI       Care Plan review completed: yes    Medication Review:  No changes to current psychiatric medication(s)     Current Outpatient Medications   Medication     Acetaminophen (TYLENOL) 325 MG CAPS     ARIPiprazole (ABILIFY) 5 MG tablet     aspirin (SM ASPIRIN ADULT LOW STRENGTH) 81 MG EC tablet     BD VIKTORIA U/F 32G X 4 MM insulin pen needle     ciclopirox (LOPROX) 0.77 % cream     Continuous Blood Gluc  (FREESTYLE CLAUDIA 14 DAY READER) CECILIO     Continuous Blood Gluc Sensor (FREESTYLE CLAUDIA 2 SENSOR) MISC     cyanocobalamin (VITAMIN B-12) 1000 MCG tablet     empagliflozin (JARDIANCE) 10 MG TABS tablet     insulin aspart (NOVOLOG PEN) 100 UNIT/ML pen     insulin degludec (TRESIBA FLEXTOUCH) 100 UNIT/ML pen     lamiVUDine (EPIVIR) 100 MG tablet     lisinopril (ZESTRIL) 5 MG tablet     methocarbamol (ROBAXIN) 750 MG tablet     metoprolol succinate ER (TOPROL-XL) 25 MG 24 hr tablet     Multiple Vitamin (TAB-A-GLADIS) TABS     mycophenolate (GENERIC EQUIVALENT) 250 MG capsule     order for DME     oxyCODONE (ROXICODONE) 5 MG tablet     pantoprazole (PROTONIX) 40 MG EC tablet     polyethylene glycol (MIRALAX) 17 g packet     predniSONE (DELTASONE) 5 MG tablet     rosuvastatin (CRESTOR) 5 MG tablet     senna-docusate (SENOKOT-S/PERICOLACE) 8.6-50 MG tablet     tamsulosin (FLOMAX) 0.4 MG capsule     vitamin D3 (CHOLECALCIFEROL) 50 mcg (2000 units) tablet     Current Facility-Administered Medications   Medication     aflibercept (EYLEA) injection prefilled syringe 2 mg      aflibercept (EYLEA) injection prefilled syringe 2 mg         Medication Compliance:  Yes    Changes in Health Issues:   None reported    Chemical Use Review:   Substance Use: Chemical use reviewed, no active concerns identified      Tobacco Use: No current tobacco use.         Assessment: Current Emotional / Mental Status (status of significant symptoms):  Risk status (Self / Other harm or suicidal ideation)  Patient denies a history of suicidal ideation, suicide attempts, self-injurious behavior, homicidal ideation, homicidal behavior and and other safety concerns     Patient denies current fears or concerns for personal safety.  Patient denies current or recent suicidal ideation or behaviors.  Patient denies current or recent homicidal ideation or behaviors.  Patient denies current or recent self injurious behavior or ideation.  Patient denies other safety concerns.     A safety and risk management plan has not been developed at this time, however patient was encouraged to call [place] [phone] should there be a change in any of these risk factors.    [place] Suicide Severity Rating Scale (Short Version)  [place] Suicide Severity Rating (Short Version) 11/10/2019 12/2/2019 12/10/2019 6/11/2020 7/1/2020 7/12/2021 1/10/2022   Over the past 2 weeks have you felt down, depressed, or hopeless? no yes yes no no no no   Over the past 2 weeks have you had thoughts of killing yourself? no yes no no no no no   Comments - lots of stressors, has thought about not taking meds - - - - -   Have you ever attempted to kill yourself? no no no no no no no   Q1 Wished to be Dead (Past Month) - other (see comments) no - - - -   Comments - why did i do this surgery - - - - -   Q2 Suicidal Thoughts (Past Month) - no no - - - -   Comments - wishes he wouldn't have gone through surgery - - - - -   Q3 Suicidal Thought Method - no no - - - -   Q4 Suicidal Intent without Specific Plan - no no - - - -   Q5 Suicide Intent with  Specific Plan - no no - - - -   Q6 Suicide Behavior (Lifetime) - no no - - - -         Appearance:   Appropriate   Eye Contact:   Good   Psychomotor Behavior: Restless   Attitude:   Cooperative  Interested Friendly Pleasant  Orientation:   All  Speech   Rate / Production: Normal/ Responsive   Volume:  Normal   Mood:    Normal  Affect:    Appropriate    Thought Content:  Clear   Thought Form:  Goal Directed  Logical   Insight:    Good     Diagnoses:  1. Bipolar 1 disorder, depressed, mild (H)          Collateral Reports Completed:  Not Applicable    Plan: (Homework, other):  Continue with this writer for individual psychotherapy in three weeks. He will continue to work on managing anxiety through adaptive behavior change, like tolerable/safe exercise/movement and engaging with social support contacts.     Joseph Murillo, LP  February 8, 2022              ______________________________________________________________________    [facility]al [facility] Treatment Plan    Client's Name: Frandy Workman  YOB: 1965    Date: 11/16/2021      DSM-V Diagnoses: 296.51 Bipolar I Disorder Current or Most Recent Episode Depressed, Mild;     Clinical Global Impressions  First:  Considering your total clinical experience with this particular patient population, how severe are the patient's symptoms at this time?: 3 (11/29/2021  8:49 AM)      Most recent:  Compared to the patient's condition at the START of treatment, this patient's condition is: 2 (11/29/2021  8:49 AM)        Referral / Collaboration:  Will collaborate with care team as indicated during treatment.    Anticipated number of session or this episode of care: 10+      MeasurableTreatment Goal(s) related to diagnosis / functional impairment(s)  Goal 1:  Patient will experience a reduction in depressive and anxious symptoms, along with a corresponding increase in positive emotion and life satisfaction.    Objective #A: Patient will experience a  reduction in depressed mood, will develop more effective coping skills to manage depressive symptoms, will develop healthy cognitive patterns and beliefs, will increase ability to function adaptively and will continue to take medications as prescribed / participate in supportive activities and services    Status: Continued - Date(s): 11/16/2021    Objective #B: Patient will experience a reduction in anxiety, will develop more effective coping skills to manage anxiety symptoms, will develop healthy cognitive patterns and beliefs and will increase ability to function adaptively  Status: Continued - Date(s): 11/16/2021    Objective #C: Patient will develop better understanding of triggers and coping strategies to stabilize mood  Status: Continued - Date(s): 11/16/2021    Goal 2:  Patient will identify and increase engagement in valued activity, i.e. improving social connections/relationships, pursuing occupational goals or personally meaningful pursuits, exploration of meaning in life.     Objective #A: Patient will identify meaningful activity in social, occupational and  personal goals, and increase behavioral activation around these goals   Status: Continued - Date(s): 11/16/2021    Objective #B: Patient will address relationship difficulties in a more adaptive manner  Status: Continued - Date(s): 11/16/2021    Objective #C: Patient will develop coping/problem-solving skills to facilitate more adaptive adjustment and will effectively address problems that interfere with adaptive functioning  Status: Continued - Date(s): 11/16/2021        Possible Therapeutic Intervention(s)  Psycho-education regarding mental health diagnoses and treatment options    Skills training    Explore skills useful to client in current situation.    Skills include assertiveness, communication, conflict management, problem-solving, relaxation, etc.    Solution-Focused Therapy    Explore patterns in patient's relationships and discuss options  for new behaviors.    Explore patterns in patient's actions and choices and discuss options for new behaviors.    Cognitive-behavioral Therapy    Discuss common cognitive distortions, identify them in patient's life.    Explore ways to challenge, replace, and act against these cognitions.    Acceptance and Commitment Therapy    Explore and identify important values in patient's life.    Discuss ways to commit to behavioral activation around these values.    Psychodynamic psychotherapy    Discuss patient's emotional dynamics and issues and how they impact behaviors.    Explore patient's history of relationships and how they impact present behaviors.    Explore how to work with and make changes in these schemas and patterns.    Narrative Therapy    Explore the patient's story of his/her life from his/her perspective.    Explore alternate ways of understanding their experience, identifying exceptions, developing new themes.    Interpersonal Psychotherapy    Explore patterns in relationships that are effective or ineffective at helping patient reach their goals, find satisfying experience.    Discuss new patterns or behaviors to engage in for improved social functioning.    Behavioral Activation    Discuss steps patient can take to become more involved in meaningful activity.    Identify barriers to these activities and explore possible solutions.    Mindfulness-Based Strategies    Discuss skills based on development and application of mindfulness.    Skills drawn from compassion-focused therapy, dialectical behavior therapy, mindfulness-based stress reduction, mindfulness-based cognitive therapy, etc.      We have developed these goals together during our work to this point. Patient has assisted in the development of these goals and has agreed to this treatment plan.       Joseph Murillo LP  11/16/2021        Joseph Murillo

## 2022-02-08 NOTE — PROGRESS NOTES
[facility]: Integrated Behavioral Health  February 8, 2022      Behavioral Health Clinician Progress Note    Patient Name: Frandy Workman           Service Type: Video visit      Service Location:  Video visit      Session Start Time: 2:04  Session End Time:  2:20      Session Length: 16 - 37      Attendees: Patient    Visit Activities (Refresh list every visit): [facility] Only     Telemedicine Visit: The patient's condition can be safely assessed and treated via synchronous audio and visual telemedicine encounter.      Reason for Telemedicine Visit: COVID-19    Originating Site (Patient Location): Patient's home    Distant Site (Provider Location): Provider Remote Setting    Consent:  The patient/guardian has verbally consented to: the potential risks and benefits of telemedicine (video visit) versus in person care; bill my insurance or make self-payment for services provided; and responsibility for payment of non-covered services.     Mode of Communication:  Video Conference via [email or web address]    As the provider I attest to compliance with applicable laws and regulations related to telemedicine.    Diagnostic Assessment Date:  12/03/2020  Treatment Plan Review Date:  2/16/2022  See Flowsheets for today's PHQ-9 and LIZZETTE-7 results  Previous PHQ-9:   PHQ-9 SCORE 9/30/2021 1/4/2022 2/8/2022   PHQ-9 Total Score MyChart - 4 (Minimal depression) 4 (Minimal depression)   PHQ-9 Total Score 10 4 4     Previous LIZZETTE-7:   LIZZETTE-7 SCORE 12/29/2020 4/7/2021 9/30/2021   Total Score 8 (mild anxiety) 9 (mild anxiety) -   Total Score 8 9 10       Extended Session (60+ minutes): No  Interactive Complexity: No  Crisis: No    Treatment Objective(s) Addressed in This Session:  Target Behavior(s): disease management/lifestyle changes  related to adaptive approaches to managing anxious distress and mood difficulties      Current Stressors / Issues:  [facility] met with Miller today for a follow-up. Brief session. Discussed  how things are going for him. Reports decline in his health over the last few months with his prognosis unknown. Reports a few stays in the hospital and him receiving three blood transfusions recently. Reports very low energy and unable to do much at home other than sleep and a few basics. Denies depression however, just notes he feels frustrated and exhausted because of his health difficulties. Provided mainly supportive counseling. Discussed his issues with transportation, getting to and from appointments. Discussed consulting with social work services and offered to reach out on his behalf. Miller agreed to this. Encouraged more social support engagement, how to connect with others during difficult times with his health.    Progress on Treatment Objective(s) / Homework:  Stable - MAINTENANCE (Working to maintain change, with risk of relapse); Intervened by continuing to positively reinforce healthy behavior choice       Solution-Focused Therapy    Explore patterns in patient's relationships and discuss options for new behaviors.    Explore patterns in patient's actions and choices and discuss options for new behaviors.    Supportive Psychotherapy      Answers for HPI/ROS submitted by the patient on 2/8/2022  If you checked off any problems, how difficult have these problems made it for you to do your work, take care of things at home, or get along with other people?: Somewhat difficult  PHQ9 TOTAL SCORE: 4      Answers for HPI/ROS submitted by the patient on 4/7/2021   LIZZETTE 7 TOTAL SCORE: 9  If you checked off any problems, how difficult have these problems made it for you to do your work, take care of things at home, or get along with other people?: Very difficult  PHQ9 TOTAL SCORE: 7*    *No SI    Answers for HPI/ROS submitted by the patient on 10/13/2020   If you checked off any problems, how difficult have these problems made it for you to do your work, take care of things at home, or get along with other  people?: Somewhat difficult  PHQ9 TOTAL SCORE: 8*  LIZZETTE 7 TOTAL SCORE: 6      *No SI     Answers for HPI/ROS submitted by the patient on 12/29/2020   If you checked off any problems, how difficult have these problems made it for you to do your work, take care of things at home, or get along with other people?: Somewhat difficult  PHQ9 TOTAL SCORE: 5*  LIZZETTE 7 TOTAL SCORE: 8    *No SI       Care Plan review completed: yes    Medication Review:  No changes to current psychiatric medication(s)     Current Outpatient Medications   Medication     Acetaminophen (TYLENOL) 325 MG CAPS     ARIPiprazole (ABILIFY) 5 MG tablet     aspirin (SM ASPIRIN ADULT LOW STRENGTH) 81 MG EC tablet     BD VIKTORIA U/F 32G X 4 MM insulin pen needle     ciclopirox (LOPROX) 0.77 % cream     Continuous Blood Gluc  (CUPSYLE CLAUDIA 14 DAY READER) CECILIO     Continuous Blood Gluc Sensor (FREESTYLE CLAUDIA 2 SENSOR) MISC     cyanocobalamin (VITAMIN B-12) 1000 MCG tablet     empagliflozin (JARDIANCE) 10 MG TABS tablet     insulin aspart (NOVOLOG PEN) 100 UNIT/ML pen     insulin degludec (TRESIBA FLEXTOUCH) 100 UNIT/ML pen     lamiVUDine (EPIVIR) 100 MG tablet     lisinopril (ZESTRIL) 5 MG tablet     methocarbamol (ROBAXIN) 750 MG tablet     metoprolol succinate ER (TOPROL-XL) 25 MG 24 hr tablet     Multiple Vitamin (TAB-A-GLADIS) TABS     mycophenolate (GENERIC EQUIVALENT) 250 MG capsule     order for DME     oxyCODONE (ROXICODONE) 5 MG tablet     pantoprazole (PROTONIX) 40 MG EC tablet     polyethylene glycol (MIRALAX) 17 g packet     predniSONE (DELTASONE) 5 MG tablet     rosuvastatin (CRESTOR) 5 MG tablet     senna-docusate (SENOKOT-S/PERICOLACE) 8.6-50 MG tablet     tamsulosin (FLOMAX) 0.4 MG capsule     vitamin D3 (CHOLECALCIFEROL) 50 mcg (2000 units) tablet     Current Facility-Administered Medications   Medication     aflibercept (EYLEA) injection prefilled syringe 2 mg     aflibercept (EYLEA) injection prefilled syringe 2 mg         Medication  Compliance:  Yes    Changes in Health Issues:   None reported    Chemical Use Review:   Substance Use: Chemical use reviewed, no active concerns identified      Tobacco Use: No current tobacco use.         Assessment: Current Emotional / Mental Status (status of significant symptoms):  Risk status (Self / Other harm or suicidal ideation)  Patient denies a history of suicidal ideation, suicide attempts, self-injurious behavior, homicidal ideation, homicidal behavior and and other safety concerns     Patient denies current fears or concerns for personal safety.  Patient denies current or recent suicidal ideation or behaviors.  Patient denies current or recent homicidal ideation or behaviors.  Patient denies current or recent self injurious behavior or ideation.  Patient denies other safety concerns.     A safety and risk management plan has not been developed at this time, however patient was encouraged to call [place] [phone] should there be a change in any of these risk factors.    [place] Suicide Severity Rating Scale (Short Version)  [place] Suicide Severity Rating (Short Version) 11/10/2019 12/2/2019 12/10/2019 6/11/2020 7/1/2020 7/12/2021 1/10/2022   Over the past 2 weeks have you felt down, depressed, or hopeless? no yes yes no no no no   Over the past 2 weeks have you had thoughts of killing yourself? no yes no no no no no   Comments - lots of stressors, has thought about not taking meds - - - - -   Have you ever attempted to kill yourself? no no no no no no no   Q1 Wished to be Dead (Past Month) - other (see comments) no - - - -   Comments - why did i do this surgery - - - - -   Q2 Suicidal Thoughts (Past Month) - no no - - - -   Comments - wishes he wouldn't have gone through surgery - - - - -   Q3 Suicidal Thought Method - no no - - - -   Q4 Suicidal Intent without Specific Plan - no no - - - -   Q5 Suicide Intent with Specific Plan - no no - - - -   Q6 Suicide Behavior (Lifetime) - no no - - - -          Appearance:   Appropriate   Eye Contact:   Good   Psychomotor Behavior: Restless   Attitude:   Cooperative  Interested Friendly Pleasant  Orientation:   All  Speech   Rate / Production: Normal/ Responsive   Volume:  Normal   Mood:    Normal  Affect:    Appropriate    Thought Content:  Clear   Thought Form:  Goal Directed  Logical   Insight:    Good     Diagnoses:  1. Bipolar 1 disorder, depressed, mild (H)          Collateral Reports Completed:  Not Applicable    Plan: (Homework, other):  Continue with this writer for individual psychotherapy in three weeks. He will continue to work on managing anxiety through adaptive behavior change, like tolerable/safe exercise/movement and engaging with social support contacts.     Joseph Murillo, LP  February 8, 2022              ______________________________________________________________________    [facility] Integrated [facility]al Health Treatment Plan    Client's Name: Frandy Workman  YOB: 1965    Date: 11/16/2021      DSM-V Diagnoses: 296.51 Bipolar I Disorder Current or Most Recent Episode Depressed, Mild;     Clinical Global Impressions  First:  Considering your total clinical experience with this particular patient population, how severe are the patient's symptoms at this time?: 3 (11/29/2021  8:49 AM)      Most recent:  Compared to the patient's condition at the START of treatment, this patient's condition is: 2 (11/29/2021  8:49 AM)        Referral / Collaboration:  Will collaborate with care team as indicated during treatment.    Anticipated number of session or this episode of care: 10+      MeasurableTreatment Goal(s) related to diagnosis / functional impairment(s)  Goal 1:  Patient will experience a reduction in depressive and anxious symptoms, along with a corresponding increase in positive emotion and life satisfaction.    Objective #A: Patient will experience a reduction in depressed mood, will develop more effective coping skills to manage  depressive symptoms, will develop healthy cognitive patterns and beliefs, will increase ability to function adaptively and will continue to take medications as prescribed / participate in supportive activities and services    Status: Continued - Date(s): 11/16/2021    Objective #B: Patient will experience a reduction in anxiety, will develop more effective coping skills to manage anxiety symptoms, will develop healthy cognitive patterns and beliefs and will increase ability to function adaptively  Status: Continued - Date(s): 11/16/2021    Objective #C: Patient will develop better understanding of triggers and coping strategies to stabilize mood  Status: Continued - Date(s): 11/16/2021    Goal 2:  Patient will identify and increase engagement in valued activity, i.e. improving social connections/relationships, pursuing occupational goals or personally meaningful pursuits, exploration of meaning in life.     Objective #A: Patient will identify meaningful activity in social, occupational and  personal goals, and increase behavioral activation around these goals   Status: Continued - Date(s): 11/16/2021    Objective #B: Patient will address relationship difficulties in a more adaptive manner  Status: Continued - Date(s): 11/16/2021    Objective #C: Patient will develop coping/problem-solving skills to facilitate more adaptive adjustment and will effectively address problems that interfere with adaptive functioning  Status: Continued - Date(s): 11/16/2021        Possible Therapeutic Intervention(s)  Psycho-education regarding mental health diagnoses and treatment options    Skills training    Explore skills useful to client in current situation.    Skills include assertiveness, communication, conflict management, problem-solving, relaxation, etc.    Solution-Focused Therapy    Explore patterns in patient's relationships and discuss options for new behaviors.    Explore patterns in patient's actions and choices and  discuss options for new behaviors.    Cognitive-behavioral Therapy    Discuss common cognitive distortions, identify them in patient's life.    Explore ways to challenge, replace, and act against these cognitions.    Acceptance and Commitment Therapy    Explore and identify important values in patient's life.    Discuss ways to commit to behavioral activation around these values.    Psychodynamic psychotherapy    Discuss patient's emotional dynamics and issues and how they impact behaviors.    Explore patient's history of relationships and how they impact present behaviors.    Explore how to work with and make changes in these schemas and patterns.    Narrative Therapy    Explore the patient's story of his/her life from his/her perspective.    Explore alternate ways of understanding their experience, identifying exceptions, developing new themes.    Interpersonal Psychotherapy    Explore patterns in relationships that are effective or ineffective at helping patient reach their goals, find satisfying experience.    Discuss new patterns or behaviors to engage in for improved social functioning.    Behavioral Activation    Discuss steps patient can take to become more involved in meaningful activity.    Identify barriers to these activities and explore possible solutions.    Mindfulness-Based Strategies    Discuss skills based on development and application of mindfulness.    Skills drawn from compassion-focused therapy, dialectical behavior therapy, mindfulness-based stress reduction, mindfulness-based cognitive therapy, etc.      We have developed these goals together during our work to this point. Patient has assisted in the development of these goals and has agreed to this treatment plan.       Joseph Murillo LP  11/16/2021

## 2022-02-08 NOTE — LETTER
2/8/2022       RE: Frandy Workman  [address] [address]     Dear Colleague,    Thank you for referring your patient, Frandy Workman, to the [facility] MENTAL HEALTH & ADDICTION SERVICES at [facility]. Please see a copy of my visit note below.    [facility]: Integrated Behavioral Health  February 8, 2022      Behavioral Health Clinician Progress Note    Patient Name: Frandy Workman           Service Type: Video visit      Service Location:  Video visit      Session Start Time: 2:04  Session End Time:  2:20      Session Length: 16 - 37      Attendees: Patient    Visit Activities (Refresh list every visit): [facility] Only     Telemedicine Visit: The patient's condition can be safely assessed and treated via synchronous audio and visual telemedicine encounter.      Reason for Telemedicine Visit: COVID-19    Originating Site (Patient Location): Patient's home    Distant Site (Provider Location): Provider Remote Setting    Consent:  The patient/guardian has verbally consented to: the potential risks and benefits of telemedicine (video visit) versus in person care; bill my insurance or make self-payment for services provided; and responsibility for payment of non-covered services.     Mode of Communication:  Video Conference via Visi[employer]    As the provider I attest to compliance with applicable laws and regulations related to telemedicine.    Diagnostic Assessment Date:  12/03/2020  Treatment Plan Review Date:  2/16/2022  See Flowsheets for today's PHQ-9 and LIZZETTE-7 results  Previous PHQ-9:   PHQ-9 SCORE 9/30/2021 1/4/2022 2/8/2022   PHQ-9 Total Score MyChart - 4 (Minimal depression) 4 (Minimal depression)   PHQ-9 Total Score 10 4 4     Previous LIZZETTE-7:   LIZZETTE-7 SCORE 12/29/2020 4/7/2021 9/30/2021   Total Score 8 (mild anxiety) 9 (mild anxiety) -   Total Score 8 9 10       Extended Session (60+ minutes):  No  Interactive Complexity: No  Crisis: No    Treatment Objective(s) Addressed in This Session:  Target Behavior(s): disease management/lifestyle changes  related to adaptive approaches to managing anxious distress and mood difficulties      Current Stressors / Issues:  [facility] met with Miller today for a follow-up. Brief session. Discussed how things are going for him. Reports decline in his health over the last few months with his prognosis unknown. Reports a few stays in the hospital and him receiving three blood transfusions recently. Reports very low energy and unable to do much at home other than sleep and a few basics. Denies depression however, just notes he feels frustrated and exhausted because of his health difficulties. Provided mainly supportive counseling. Discussed his issues with transportation, getting to and from appointments. Discussed consulting with social work services and offered to reach out on his behalf. Miller agreed to this. Encouraged more social support engagement, how to connect with others during difficult times with his health.    Progress on Treatment Objective(s) / Homework:  Stable - MAINTENANCE (Working to maintain change, with risk of relapse); Intervened by continuing to positively reinforce healthy behavior choice       Solution-Focused Therapy    Explore patterns in patient's relationships and discuss options for new behaviors.    Explore patterns in patient's actions and choices and discuss options for new behaviors.    Supportive Psychotherapy      Answers for HPI/ROS submitted by the patient on 2/8/2022  If you checked off any problems, how difficult have these problems made it for you to do your work, take care of things at home, or get along with other people?: Somewhat difficult  PHQ9 TOTAL SCORE: 4      Answers for HPI/ROS submitted by the patient on 4/7/2021   LIZZETTE 7 TOTAL SCORE: 9  If you checked off any problems, how difficult have these problems made it for you to do your  work, take care of things at home, or get along with other people?: Very difficult  PHQ9 TOTAL SCORE: 7*    *No SI    Answers for HPI/ROS submitted by the patient on 10/13/2020   If you checked off any problems, how difficult have these problems made it for you to do your work, take care of things at home, or get along with other people?: Somewhat difficult  PHQ9 TOTAL SCORE: 8*  LIZZETTE 7 TOTAL SCORE: 6      *No SI     Answers for HPI/ROS submitted by the patient on 12/29/2020   If you checked off any problems, how difficult have these problems made it for you to do your work, take care of things at home, or get along with other people?: Somewhat difficult  PHQ9 TOTAL SCORE: 5*  LIZZETTE 7 TOTAL SCORE: 8    *No SI       Care Plan review completed: yes    Medication Review:  No changes to current psychiatric medication(s)     Current Outpatient Medications   Medication     Acetaminophen (TYLENOL) 325 MG CAPS     ARIPiprazole (ABILIFY) 5 MG tablet     aspirin (SM ASPIRIN ADULT LOW STRENGTH) 81 MG EC tablet     BD VIKTORIA U/F 32G X 4 MM insulin pen needle     ciclopirox (LOPROX) 0.77 % cream     Continuous Blood Gluc  (FREESTYLE CLAUDIA 14 DAY READER) CECILIO     Continuous Blood Gluc Sensor (FREESTYLE CLAUDIA 2 SENSOR) MISC     cyanocobalamin (VITAMIN B-12) 1000 MCG tablet     empagliflozin (JARDIANCE) 10 MG TABS tablet     insulin aspart (NOVOLOG PEN) 100 UNIT/ML pen     insulin degludec (TRESIBA FLEXTOUCH) 100 UNIT/ML pen     lamiVUDine (EPIVIR) 100 MG tablet     lisinopril (ZESTRIL) 5 MG tablet     methocarbamol (ROBAXIN) 750 MG tablet     metoprolol succinate ER (TOPROL-XL) 25 MG 24 hr tablet     Multiple Vitamin (TAB-A-GLADIS) TABS     mycophenolate (GENERIC EQUIVALENT) 250 MG capsule     order for DME     oxyCODONE (ROXICODONE) 5 MG tablet     pantoprazole (PROTONIX) 40 MG EC tablet     polyethylene glycol (MIRALAX) 17 g packet     predniSONE (DELTASONE) 5 MG tablet     rosuvastatin (CRESTOR) 5 MG tablet     senna-docusate  (SENOKOT-S/PERICOLACE) 8.6-50 MG tablet     tamsulosin (FLOMAX) 0.4 MG capsule     vitamin D3 (CHOLECALCIFEROL) 50 mcg (2000 units) tablet     Current Facility-Administered Medications   Medication     aflibercept (EYLEA) injection prefilled syringe 2 mg     aflibercept (EYLEA) injection prefilled syringe 2 mg         Medication Compliance:  Yes    Changes in Health Issues:   None reported    Chemical Use Review:   Substance Use: Chemical use reviewed, no active concerns identified      Tobacco Use: No current tobacco use.         Assessment: Current Emotional / Mental Status (status of significant symptoms):  Risk status (Self / Other harm or suicidal ideation)  Patient denies a history of suicidal ideation, suicide attempts, self-injurious behavior, homicidal ideation, homicidal behavior and and other safety concerns     Patient denies current fears or concerns for personal safety.  Patient denies current or recent suicidal ideation or behaviors.  Patient denies current or recent homicidal ideation or behaviors.  Patient denies current or recent self injurious behavior or ideation.  Patient denies other safety concerns.     A safety and risk management plan has not been developed at this time, however patient was encouraged to call [place] [phone] should there be a change in any of these risk factors.    [place] Suicide Severity Rating Scale (Short Version)  [place] Suicide Severity Rating (Short Version) 11/10/2019 12/2/2019 12/10/2019 6/11/2020 7/1/2020 7/12/2021 1/10/2022   Over the past 2 weeks have you felt down, depressed, or hopeless? no yes yes no no no no   Over the past 2 weeks have you had thoughts of killing yourself? no yes no no no no no   Comments - lots of stressors, has thought about not taking meds - - - - -   Have you ever attempted to kill yourself? no no no no no no no   Q1 Wished to be Dead (Past Month) - other (see comments) no - - - -   Comments - why did i do this surgery - - - - -    Q2 Suicidal Thoughts (Past Month) - no no - - - -   Comments - wishes he wouldn't have gone through surgery - - - - -   Q3 Suicidal Thought Method - no no - - - -   Q4 Suicidal Intent without Specific Plan - no no - - - -   Q5 Suicide Intent with Specific Plan - no no - - - -   Q6 Suicide Behavior (Lifetime) - no no - - - -         Appearance:   Appropriate   Eye Contact:   Good   Psychomotor Behavior: Restless   Attitude:   Cooperative  Interested Friendly Pleasant  Orientation:   All  Speech   Rate / Production: Normal/ Responsive   Volume:  Normal   Mood:    Normal  Affect:    Appropriate    Thought Content:  Clear   Thought Form:  Goal Directed  Logical   Insight:    Good     Diagnoses:  1. Bipolar 1 disorder, depressed, mild (H)          Collateral Reports Completed:  Not Applicable    Plan: (Homework, other):  Continue with this writer for individual psychotherapy in three weeks. He will continue to work on managing anxiety through adaptive behavior change, like tolerable/safe exercise/movement and engaging with social support contacts.     Joseph Murillo, LP  February 8, 2022              ______________________________________________________________________    [facility] Integrated Behavioral Health Treatment Plan    Client's Name: Frandy Workman  YOB: 1965    Date: 11/16/2021      DSM-V Diagnoses: 296.51 Bipolar I Disorder Current or Most Recent Episode Depressed, Mild;     Clinical Global Impressions  First:  Considering your total clinical experience with this particular patient population, how severe are the patient's symptoms at this time?: 3 (11/29/2021  8:49 AM)      Most recent:  Compared to the patient's condition at the START of treatment, this patient's condition is: 2 (11/29/2021  8:49 AM)        Referral / Collaboration:  Will collaborate with care team as indicated during treatment.    Anticipated number of session or this episode of care: 10+      MeasurableTreatment Goal(s)  related to diagnosis / functional impairment(s)  Goal 1:  Patient will experience a reduction in depressive and anxious symptoms, along with a corresponding increase in positive emotion and life satisfaction.    Objective #A: Patient will experience a reduction in depressed mood, will develop more effective coping skills to manage depressive symptoms, will develop healthy cognitive patterns and beliefs, will increase ability to function adaptively and will continue to take medications as prescribed / participate in supportive activities and services    Status: Continued - Date(s): 11/16/2021    Objective #B: Patient will experience a reduction in anxiety, will develop more effective coping skills to manage anxiety symptoms, will develop healthy cognitive patterns and beliefs and will increase ability to function adaptively  Status: Continued - Date(s): 11/16/2021    Objective #C: Patient will develop better understanding of triggers and coping strategies to stabilize mood  Status: Continued - Date(s): 11/16/2021    Goal 2:  Patient will identify and increase engagement in valued activity, i.e. improving social connections/relationships, pursuing occupational goals or personally meaningful pursuits, exploration of meaning in life.     Objective #A: Patient will identify meaningful activity in social, occupational and  personal goals, and increase behavioral activation around these goals   Status: Continued - Date(s): 11/16/2021    Objective #B: Patient will address relationship difficulties in a more adaptive manner  Status: Continued - Date(s): 11/16/2021    Objective #C: Patient will develop coping/problem-solving skills to facilitate more adaptive adjustment and will effectively address problems that interfere with adaptive functioning  Status: Continued - Date(s): 11/16/2021        Possible Therapeutic Intervention(s)  Psycho-education regarding mental health diagnoses and treatment options    Skills  training    Explore skills useful to client in current situation.    Skills include assertiveness, communication, conflict management, problem-solving, relaxation, etc.    Solution-Focused Therapy    Explore patterns in patient's relationships and discuss options for new behaviors.    Explore patterns in patient's actions and choices and discuss options for new behaviors.    Cognitive-behavioral Therapy    Discuss common cognitive distortions, identify them in patient's life.    Explore ways to challenge, replace, and act against these cognitions.    Acceptance and Commitment Therapy    Explore and identify important values in patient's life.    Discuss ways to commit to behavioral activation around these values.    Psychodynamic psychotherapy    Discuss patient's emotional dynamics and issues and how they impact behaviors.    Explore patient's history of relationships and how they impact present behaviors.    Explore how to work with and make changes in these schemas and patterns.    Narrative Therapy    Explore the patient's story of his/her life from his/her perspective.    Explore alternate ways of understanding their experience, identifying exceptions, developing new themes.    Interpersonal Psychotherapy    Explore patterns in relationships that are effective or ineffective at helping patient reach their goals, find satisfying experience.    Discuss new patterns or behaviors to engage in for improved social functioning.    Behavioral Activation    Discuss steps patient can take to become more involved in meaningful activity.    Identify barriers to these activities and explore possible solutions.    Mindfulness-Based Strategies    Discuss skills based on development and application of mindfulness.    Skills drawn from compassion-focused therapy, dialectical behavior therapy, mindfulness-based stress reduction, mindfulness-based cognitive therapy, etc.      We have developed these goals together during our work  to this point. Patient has assisted in the development of these goals and has agreed to this treatment plan.       Joseph Murillo, RED  11/16/2021      Again, thank you for allowing me to participate in the care of your patient.      Sincerely,    Joseph Murillo

## 2022-02-08 NOTE — TELEPHONE ENCOUNTER
Follow-up with anemia management service:    Spoke with Miller to remind him to schedule Aranesp this week.  He will call the [facility] to schedule for Thursday.     Anemia Latest Ref Rng & Units 1/5/2022 1/10/2022 1/11/2022 1/12/2022 1/13/2022 2/3/2022 2/4/2022   IVELISSE Dose - 100 mcg - - - - 100 mcg -   Hemoglobin 13.3 - 17.7 g/dL 6.9(LL) 6.9(LL) 7.0(L) 7.9(L) 7.7(L) 6.3(LL) -   TSAT 15 - 46 % - - - - - - -   Ferritin 26 - 388 ng/mL - - - - - - -   PRBCs - - - 1 unit  - - - 1 unit            Follow-up call date: 2/10/22    Jie Blum RN   Anemia Services  [address] [address]   [email or web address]   Office : [phone]  Fax: [phone]

## 2022-02-09 ENCOUNTER — ALLIED HEALTH/NURSE VISIT (OUTPATIENT)
Dept: TRANSPLANT | Facility: CLINIC | Age: 57
End: 2022-02-09
Attending: INTERNAL MEDICINE
Payer: MEDICARE

## 2022-02-09 ENCOUNTER — LAB (OUTPATIENT)
Dept: LAB | Facility: CLINIC | Age: 57
End: 2022-02-09
Attending: INTERNAL MEDICINE
Payer: MEDICARE

## 2022-02-09 VITALS — DIASTOLIC BLOOD PRESSURE: 57 MMHG | SYSTOLIC BLOOD PRESSURE: 97 MMHG

## 2022-02-09 DIAGNOSIS — N18.32 STAGE 3B CHRONIC KIDNEY DISEASE (H): ICD-10-CM

## 2022-02-09 DIAGNOSIS — N18.32 STAGE 3B CHRONIC KIDNEY DISEASE (H): Primary | ICD-10-CM

## 2022-02-09 DIAGNOSIS — N18.32 ANEMIA OF CHRONIC RENAL FAILURE, STAGE 3B (H): ICD-10-CM

## 2022-02-09 DIAGNOSIS — D63.1 ANEMIA OF CHRONIC RENAL FAILURE, STAGE 3B (H): ICD-10-CM

## 2022-02-09 LAB
FERRITIN SERPL-MCNC: 1046 NG/ML (ref 26–388)
HCT VFR BLD AUTO: 25.9 % (ref 40–53)
HGB BLD-MCNC: 7.9 G/DL (ref 13.3–17.7)
IRON SATN MFR SERPL: 39 % (ref 15–46)
IRON SERPL-MCNC: 96 UG/DL (ref 35–180)
TIBC SERPL-MCNC: 248 UG/DL (ref 240–430)

## 2022-02-09 PROCEDURE — 83550 IRON BINDING TEST: CPT | Performed by: PATHOLOGY

## 2022-02-09 PROCEDURE — 96372 THER/PROPH/DIAG INJ SC/IM: CPT | Performed by: INTERNAL MEDICINE

## 2022-02-09 PROCEDURE — 85018 HEMOGLOBIN: CPT | Performed by: PATHOLOGY

## 2022-02-09 PROCEDURE — 82728 ASSAY OF FERRITIN: CPT | Performed by: PATHOLOGY

## 2022-02-09 PROCEDURE — 85014 HEMATOCRIT: CPT | Performed by: PATHOLOGY

## 2022-02-09 PROCEDURE — 250N000011 HC RX IP 250 OP 636: Mod: EC | Performed by: INTERNAL MEDICINE

## 2022-02-09 PROCEDURE — 36415 COLL VENOUS BLD VENIPUNCTURE: CPT | Performed by: PATHOLOGY

## 2022-02-09 RX ADMIN — DARBEPOETIN ALFA 100 MCG: 100 INJECTION, SOLUTION INTRAVENOUS; SUBCUTANEOUS at 10:01

## 2022-02-09 ASSESSMENT — PATIENT HEALTH QUESTIONNAIRE - PHQ9: SUM OF ALL RESPONSES TO PHQ QUESTIONS 1-9: 4

## 2022-02-09 NOTE — PROGRESS NOTES
Frequency: Every two weeks  Most recent or today's HGB: 7.9   Date: 2/9/22  Date of lat dose: 100mcg  HGB associated with last dose given: 6.8    Blood Pressure:97/57    Diagnosis: CKD Stage 3    Ordered by: Dr.Malinda Maira MD  VIS Offered: yes    Double Checked by: Rhonda Erazo MA

## 2022-02-10 ENCOUNTER — OFFICE VISIT (OUTPATIENT)
Dept: ENDOCRINOLOGY | Facility: CLINIC | Age: 57
End: 2022-02-10
Payer: MEDICARE

## 2022-02-10 ENCOUNTER — TELEPHONE (OUTPATIENT)
Dept: TRANSPLANT | Facility: CLINIC | Age: 57
End: 2022-02-10

## 2022-02-10 ENCOUNTER — TELEPHONE (OUTPATIENT)
Dept: PHARMACY | Facility: CLINIC | Age: 57
End: 2022-02-10

## 2022-02-10 DIAGNOSIS — E11.69 TYPE 2 DIABETES MELLITUS WITH DIABETIC FOOT DEFORMITY (H): ICD-10-CM

## 2022-02-10 DIAGNOSIS — M21.969 TYPE 2 DIABETES MELLITUS WITH DIABETIC FOOT DEFORMITY (H): ICD-10-CM

## 2022-02-10 DIAGNOSIS — E11.49 TYPE II OR UNSPECIFIED TYPE DIABETES MELLITUS WITH NEUROLOGICAL MANIFESTATIONS, NOT STATED AS UNCONTROLLED(250.60) (H): Primary | ICD-10-CM

## 2022-02-10 DIAGNOSIS — L60.2 ONYCHAUXIS: ICD-10-CM

## 2022-02-10 PROCEDURE — 99214 OFFICE O/P EST MOD 30 MIN: CPT | Mod: 25 | Performed by: PODIATRIST

## 2022-02-10 PROCEDURE — G0127 TRIM NAIL(S): HCPCS | Performed by: PODIATRIST

## 2022-02-10 ASSESSMENT — ENCOUNTER SYMPTOMS
BRUISES/BLEEDS EASILY: 1
NIGHT SWEATS: 0
BOWEL INCONTINENCE: 0
SWOLLEN GLANDS: 0
CONSTIPATION: 1
BLOATING: 0
DOUBLE VISION: 0
EYE PAIN: 1
BLOOD IN STOOL: 0
ALTERED TEMPERATURE REGULATION: 0
VOMITING: 0
DIARRHEA: 0
FEVER: 0
CHILLS: 0
FATIGUE: 1
WEIGHT GAIN: 0
POLYDIPSIA: 0
INCREASED ENERGY: 0
HALLUCINATIONS: 0
EYE REDNESS: 0
WEIGHT LOSS: 1
RECTAL PAIN: 1
ABDOMINAL PAIN: 1
HEARTBURN: 0
EYE IRRITATION: 1
EYE WATERING: 1
DECREASED APPETITE: 1
POLYPHAGIA: 0
NAUSEA: 1
JAUNDICE: 0

## 2022-02-10 NOTE — TELEPHONE ENCOUNTER
Transplant Social Work Services Phone Call      Data: Referral received from Dr. Murillo re: transportation resources.  Intervention/education: I called Miller and discussed transportation options. Miller and I together called [employer] and scheduled a round trip ride for his appointment next Wednesday, 2/16 (pickup is at 8:30am). They contracted with [employer] ([phone]), and patient should call this number when he is ready to be picked up.  1) [employer]: [phone]  2) Sunil Arroyo-Miller is not interested in using this option at this time.  Assessment: Miller is currently using [employer] to bring him to his medical appointments.  He has frequent appointments and these costs are becoming a financial strain for him.  He is willing to give [employer] a try.  Cost is $5 each way.  They contract with local [employer] company [employer].  Plan: I will remain available for the psychosocial needs of this patient.      ALEXYS Mcniar, [facility]  L[facility] Transplant   Phone [phone]  Pager [phone]

## 2022-02-10 NOTE — PROGRESS NOTES
Past Medical History:   Diagnosis Date     Anemia 2013     Arthritis      BPH (benign prostatic hyperplasia)      CAD (coronary artery disease) 4/1/2019     Cholelithiasis      Conductive hearing loss 08/16/2017     Depressive disorder 1986    Suffer effects throughout life     Gastroesophageal reflux disease 12/01/2014     HCC (hepatocellular carcinoma) (H) 1/22/2019     History of diabetic retinopathy 07/2018     HTN (hypertension)      HTN (hypertension) 11/20/2019     Hyperlipidemia      Liver cirrhosis secondary to ESTRADA (H)      Liver transplanted (H) 11/11/2019     Portal vein thrombosis 4/11/2019     Type II diabetes mellitus (H)      Patient Active Problem List   Diagnosis     Bipolar affective disorder in remission (H)     Esophageal varices determined by endoscopy (H)     Brow ptosis     Paralytic lagophthalmos of right upper eyelid     Erectile dysfunction due to diseases classified elsewhere     HCC (hepatocellular carcinoma) (H)     Equivocal stress echocardiogram     Type 2 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, unspecified whether long term insulin use (H)     Status post coronary angiogram     CAD (coronary artery disease)     Liver transplant recipient (H)     Status post liver transplantation (H)     Immunosuppressed status (H)     Benign essential hypertension     Malnutrition related to chronic disease (H)     Hypophosphatasia     Chronic kidney disease, stage 3a (H)     Malnutrition (H)     Anxiety     Mild recurrent major depression (H)     Anemia of chronic renal failure, stage 3a (H)     Rekha (H)     History of coronary artery disease     Dyslipidemia     Constipation, unspecified constipation type     Excessive sweating     Stage 3b chronic kidney disease (H)     Anemia of chronic renal failure, stage 3b (H)     NSTEMI (non-ST elevated myocardial infarction) (H)     Chest pain, unspecified type     Past Surgical History:   Procedure  Laterality Date     BYPASS GRAFT ARTERY CORONARY N/A 7/14/2021    Procedure: median sternotomy, on cardiopulmonary bypass, CORONARY ARTERY BYPASS GRAFT (CABG) x2 with left greater saphenous vein endoscopic harvest and left internal mammery artery harvest;  Surgeon: Tom Zapata MD;  Location: [facility]     COLONOSCOPY      2015     COLONOSCOPY N/A 12/6/2019    Procedure: COLONOSCOPY, WITH POLYPECTOMY AND BIOPSY;  Surgeon: Adam Morton MD;  Location: [facility]     CV CENTRAL VENOUS CATHETER PLACEMENT N/A 7/12/2021    Procedure: Central Venous Catheter Placement;  Surgeon: Fermin Polanco MD;  [facility]     CV CORONARY ANGIOGRAM N/A 7/12/2021    Procedure: Coronary Angiogram;  Surgeon: Fermin Polanco MD;  Location:  [facility]     CV HEART CATHETERIZATION WITH POSSIBLE INTERVENTION N/A 2/26/2019    Procedure: CORS;  Surgeon: Jagdish Hoyt MD;  Location:  [facility]     CV INTRA AORTIC BALLOON N/A 7/12/2021    Procedure: Intra Aortic Balloon Pump Insertion;  Surgeon: Fermin Polanco MD;  Location:  [facility]     ESOPHAGOSCOPY, GASTROSCOPY, DUODENOSCOPY (EGD), COMBINED N/A 11/17/2016    Procedure: COMBINED ESOPHAGOSCOPY, GASTROSCOPY, DUODENOSCOPY (EGD);  Surgeon: Santi Rosas MD;  Location: [facility]     ESOPHAGOSCOPY, GASTROSCOPY, DUODENOSCOPY (EGD), COMBINED N/A 11/17/2017    Procedure: COMBINED ESOPHAGOSCOPY, GASTROSCOPY, DUODENOSCOPY (EGD);  EGD;  Surgeon: Santi Rosas MD;  Location: [facility]     ESOPHAGOSCOPY, GASTROSCOPY, DUODENOSCOPY (EGD), COMBINED N/A 12/28/2018    Procedure: EGD;  Surgeon: Santi Rosas MD;  Location:  OR     ESOPHAGOSCOPY, GASTROSCOPY, DUODENOSCOPY (EGD), COMBINED N/A 12/6/2019    Procedure: ESOPHAGOGASTRODUODENOSCOPY, WITH BIOPSY;  Surgeon: Adam Morton MD;  [facility]     ESOPHAGOSCOPY, GASTROSCOPY, DUODENOSCOPY (EGD),  COMBINED N/A 2020    Procedure: ESOPHAGOGASTRODUODENOSCOPY (EGD);  Surgeon: Santi Rosas MD;  Location: [facility]     HEAD & NECK SURGERY      2017 at [facility].      IMPLANT GOLD WEIGHT EYELID Right 2017    Procedure: IMPLANT WEIGHT EYELID;  Right Upper Eyelid Weight, right tarsal strip lower eyelid;  Surgeon: Milana Malave MD;  [facility]: [facility]     IR CHEMO EMBOLIZATION  2019     KNEE SURGERY Left      ORTHOPEDIC SURGERY       PAROTIDECTOMY, RADICAL NECK DISSECTION Right 2017    Procedure: PAROTIDECTOMY, RADICAL NECK DISSECTION;  Right Superfacial Parotidectomy , Facial nerve repair. with [facility] facial nerve monitor.;  Surgeon: Asiya Morgan MD;  Location: [facility]     PICC INSERTION Left 2017    4fr SL BioFlo PICC, 44cm in the L basilic vein w/ tip in the low SVC     RETURN LIVER TRANSPLANT N/A 2019    Procedure: Exploratory laparotomy, hematoma evacuation, abdominal washout;  Surgeon: Александр Ramos MD;  Location: [facility]     TRANSPLANT LIVER RECIPIENT  DONOR N/A 2019    Procedure: TRANSPLANT, LIVER, RECIPIENT,  DONOR;  Surgeon: Александр Ramos MD;  Location: [facility]     VASCULAR SURGERY       Social History     Socioeconomic History     Marital status:      Spouse name: Not on file     Number of children: Not on file     Years of education: Not on file     Highest education level: Bachelor's degree (e.g., BA, AB, BS)   Occupational History     Not on file   Tobacco Use     Smoking status: Former Smoker     Packs/day: 6.00     Years: 30.00     Pack years: 180.00     Types: Cigars     Start date: 2016     Quit date: 10/25/2017     Years since quittin.2     Smokeless tobacco: Former User     Types: Chew     Quit date: 10/31/2017     Tobacco comment: 1 tin per week   Substance and Sexual Activity     Alcohol use: No     Alcohol/week: 0.0 standard drinks     Comment: quit 1996     Drug use: No     Sexual activity: Not Currently      Partners: Female     Birth control/protection: Condom   Other Topics Concern     Parent/sibling w/ CABG, MI or angioplasty before 65F 55M? Yes   Social History Narrative    Prior [facility]     Social Determinants of Health     Financial Resource Strain: Low Risk      Difficulty of Paying Living Expenses: Not very hard   Food Insecurity: No Food Insecurity     Worried About Running Out of Food in the Last Year: Never true     Ran Out of Food in the Last Year: Never true   Transportation Needs: No Transportation Needs     Lack of Transportation (Medical): No     Lack of Transportation (Non-Medical): No   Physical Activity: Not on file   Stress: Not on file   Social Connections: Not on file   Intimate Partner Violence: Not on file   Housing Stability: Not on file     Family History   Problem Relation Age of Onset     Prostate Cancer Maternal Grandfather      Substance Abuse Maternal Grandfather         Alcohol     Colon Cancer Father 60     Pancreatic Cancer Father 60     Prostate Cancer Father      Colorectal Cancer Father      Macular Degeneration Father      Cancer Father      Glaucoma Father      Skin Cancer Father      Colorectal Cancer Maternal Grandmother      Cancer Maternal Grandmother      Substance Abuse Maternal Grandmother         Alcohol     Colorectal Cancer Paternal Grandmother      Cancer Mother      Diabetes Mother          3/2016     Cerebrovascular Disease Mother         Passed away in Feb of this year, 80 years old.     Thyroid Disease Mother      Depression Mother      Asthma Sister         Had since birth     Thyroid Disease Sister      Depression Sister      Liver Disease No family hx of      Melanoma No family hx of      Lab Results   Component Value Date    A1C 6.0 01/10/2022    A1C 6.8 2021    A1C 6.0 2020    A1C 6.3 2020    A1C 6.6 2018    A1C 6.5 2017    A1C 7.8 10/25/2016     Lab Results   Component Value Date    WBC 2.2 2022    WBC  4.4 06/28/2021     Lab Results   Component Value Date    RBC 2.14 02/03/2022    RBC 2.35 06/28/2021     Lab Results   Component Value Date    HGB 7.9 02/09/2022    HGB 7.3 06/28/2021     Lab Results   Component Value Date    HCT 25.9 02/09/2022    HCT 22.6 06/28/2021     No components found for: MCT  Lab Results   Component Value Date    MCV 98 02/03/2022    MCV 96 06/28/2021     Lab Results   Component Value Date    MCH 29.4 02/03/2022    MCH 31.1 06/28/2021     Lab Results   Component Value Date    MCHC 30.0 02/03/2022    MCHC 32.3 06/28/2021     Lab Results   Component Value Date    RDW 18.8 02/03/2022    RDW 15.1 06/28/2021     Lab Results   Component Value Date     02/03/2022     06/28/2021     Last Comprehensive Metabolic Panel:  Sodium   Date Value Ref Range Status   02/03/2022 140 133 - 144 mmol/L Final   06/28/2021 137 133 - 144 mmol/L Final     Potassium   Date Value Ref Range Status   02/03/2022 4.4 3.4 - 5.3 mmol/L Final   06/28/2021 4.6 3.4 - 5.3 mmol/L Final     Chloride   Date Value Ref Range Status   02/03/2022 105 94 - 109 mmol/L Final   06/28/2021 107 94 - 109 mmol/L Final     Carbon Dioxide   Date Value Ref Range Status   06/28/2021 25 20 - 32 mmol/L Final     Carbon Dioxide (CO2)   Date Value Ref Range Status   02/03/2022 27 20 - 32 mmol/L Final     Anion Gap   Date Value Ref Range Status   02/03/2022 8 3 - 14 mmol/L Final   06/28/2021 5 3 - 14 mmol/L Final     Glucose   Date Value Ref Range Status   02/03/2022 165 (H) 70 - 99 mg/dL Final   06/28/2021 208 (H) 70 - 99 mg/dL Final     Urea Nitrogen   Date Value Ref Range Status   02/03/2022 29 7 - 30 mg/dL Final   06/28/2021 33 (H) 7 - 30 mg/dL Final     Creatinine   Date Value Ref Range Status   02/03/2022 1.54 (H) 0.66 - 1.25 mg/dL Final   06/28/2021 1.62 (H) 0.66 - 1.25 mg/dL Final     GFR Estimate   Date Value Ref Range Status   02/03/2022 52 (L) >60 mL/min/1.73m2 Final     Comment:     Effective December 21, 2021 eGFRcr in adults  is calculated using the 2021 CKD-EPI creatinine equation which includes age and gender (Allie lira al., NEJ, DOI: 10.1056/UIIEfv6475772)   06/28/2021 46 (L) >60 mL/min/[1.73_m2] Final     Comment:     Non  GFR Calc  Starting 12/18/2018, serum creatinine based estimated GFR (eGFR) will be   calculated using the Chronic Kidney Disease Epidemiology Collaboration   (CKD-EPI) equation.       GFR, ESTIMATED POCT   Date Value Ref Range Status   11/26/2021 47 (L) >60 mL/min/1.73m2 Final     Calcium   Date Value Ref Range Status   02/03/2022 9.0 8.5 - 10.1 mg/dL Final   06/28/2021 9.1 8.5 - 10.1 mg/dL Final     Lab Results   Component Value Date    AST 15 02/03/2022    AST 9 06/28/2021     Lab Results   Component Value Date    ALT 20 02/03/2022    ALT 20 06/28/2021     No results found for: BILICONJ   Lab Results   Component Value Date    BILITOTAL 0.4 02/03/2022    BILITOTAL 0.5 06/28/2021     Lab Results   Component Value Date    ALBUMIN 3.8 02/03/2022    ALBUMIN 4.0 06/28/2021     Lab Results   Component Value Date    PROTTOTAL 6.8 02/03/2022    PROTTOTAL 7.4 06/28/2021      Lab Results   Component Value Date    ALKPHOS 99 02/03/2022    ALKPHOS 98 06/28/2021         Answers for HPI/ROS submitted by the patient on 2/10/2022  General Symptoms: Yes  Skin Symptoms: No  HENT Symptoms: No  EYE SYMPTOMS: Yes  HEART SYMPTOMS: No  LUNG SYMPTOMS: No  INTESTINAL SYMPTOMS: Yes  URINARY SYMPTOMS: No  REPRODUCTIVE SYMPTOMS: No  SKELETAL SYMPTOMS: No  BLOOD SYMPTOMS: Yes  NERVOUS SYSTEM SYMPTOMS: No  MENTAL HEALTH SYMPTOMS: No  Fever: No  Loss of appetite: Yes  Weight loss: Yes  Weight gain: No  Fatigue: Yes  Night sweats: No  Chills: No  Increased stress: No  Excessive hunger: No  Excessive thirst: No  Feeling hot or cold when others believe the temperature is normal: No  Loss of height: No  Post-operative complications: No  Surgical site pain: No  Hallucinations: No  Change in or Loss of Energy: No  Hyperactivity:  No  Confusion: No  Eye pain: Yes  Vision loss: Yes  Dry eyes: No  Watery eyes: Yes  Eye bulging: No  Double vision: No  Flashing of lights: Yes  Spots: Yes  Floaters: Yes  Redness: No  Crossed eyes: No  Tunnel Vision: No  Yellowing of eyes: No  Eye irritation: Yes  Heart burn or indigestion: No  Nausea: Yes  Vomiting: No  Abdominal pain: Yes  Bloating: No  Constipation: Yes  Diarrhea: No  Blood in stool: No  Black stools: No  Rectal or Anal pain: Yes  Fecal incontinence: No  Yellowing of skin or eyes: No  Vomit with blood: No  Change in stools: No  Anemia: Yes  Swollen glands: No  Easy bleeding or bruising: Yes  Edema or swelling: No        SUBJECTIVE FINDINGS:  A 57-year-old male returns to clinic for foot check.  He relates he is doing okay.  Relates he has numbness, tingling and neuropathy in his feet.  Relates no ulcers or sores since we have seen him last.  Relates he needs his toenails trimmed.  No other specific relieving or aggravating factors.  He does have diabetic shoes that he wears.       OBJECTIVE FINDINGS:  DP and PT 2/4 bilaterally.  He has dorsally contracted digits 2-5 bilaterally.  He has incurvated nails with minimal thickening and dystrophy 1-5 bilaterally to differing degrees.  There is no erythema, no drainage, no odor, no calor bilaterally.  Sharp/dull is intact with 5.07 [place] monofilament bilaterally.  Deep tendon reflexes are intact bilaterally.  There are no gross tendon voids bilaterally.  He has a laterally deviated hallux bilaterally.       ASSESSMENT/PLAN:  Onychauxis bilaterally.  He is diabetic with peripheral neuropathy.  His protective sensation is intact.  Diagnosis and treatment options discussed with him.  All the nails were reduced bilaterally upon consent.  Continue Diabetic shoes.  He will return to clinic and see me in about 3 months.  Previous notes reviewed.       Moderate level of medical decision making.

## 2022-02-10 NOTE — LETTER
2/10/2022     RE: Frandy Workman  [address] [facility] [address]     Dear Colleague,    Thank you for referring your patient, Frandy Workman, to the [facility] [place] at [facility]. Please see a copy of my visit note below.    Past Medical History:   Diagnosis Date     Anemia 2013     Arthritis      BPH (benign prostatic hyperplasia)      CAD (coronary artery disease) 4/1/2019     Cholelithiasis      Conductive hearing loss 08/16/2017     Depressive disorder 1986    Suffer effects throughout life     Gastroesophageal reflux disease 12/01/2014     HCC (hepatocellular carcinoma) (H) 1/22/2019     History of diabetic retinopathy 07/2018     HTN (hypertension)      HTN (hypertension) 11/20/2019     Hyperlipidemia      Liver cirrhosis secondary to ESTRADA (H)      Liver transplanted (H) 11/11/2019     Portal vein thrombosis 4/11/2019     Type II diabetes mellitus (H)      Patient Active Problem List   Diagnosis     Bipolar affective disorder in remission (H)     Esophageal varices determined by endoscopy (H)     Brow ptosis     Paralytic lagophthalmos of right upper eyelid     Erectile dysfunction due to diseases classified elsewhere     HCC (hepatocellular carcinoma) (H)     Equivocal stress echocardiogram     Type 2 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, unspecified whether long term insulin use (H)     Status post coronary angiogram     CAD (coronary artery disease)     Liver transplant recipient (H)     Status post liver transplantation (H)     Immunosuppressed status (H)     Benign essential hypertension     Malnutrition related to chronic disease (H)     Hypophosphatasia     Chronic kidney disease, stage 3a (H)     Malnutrition (H)     Anxiety     Mild recurrent major depression (H)     Anemia of chronic renal failure, stage 3a (H)     Rekha (H)     History of coronary  artery disease     Dyslipidemia     Constipation, unspecified constipation type     Excessive sweating     Stage 3b chronic kidney disease (H)     Anemia of chronic renal failure, stage 3b (H)     NSTEMI (non-ST elevated myocardial infarction) (H)     Chest pain, unspecified type     Past Surgical History:   Procedure Laterality Date     BYPASS GRAFT ARTERY CORONARY N/A 7/14/2021    Procedure: median sternotomy, on cardiopulmonary bypass, CORONARY ARTERY BYPASS GRAFT (CABG) x2 with left greater saphenous vein endoscopic harvest and left internal mammery artery harvest;  Surgeon: Tom Zapata MD;  Location: [facility] OR     COLONOSCOPY      2015     COLONOSCOPY N/A 12/6/2019    Procedure: COLONOSCOPY, WITH POLYPECTOMY AND BIOPSY;  Surgeon: Adam Morton MD;  Location: [facility] GI     CV CENTRAL VENOUS CATHETER PLACEMENT N/A 7/12/2021    Procedure: Central Venous Catheter Placement;  Surgeon: Fermin Polanco MD;  Location:  HEART CARDIAC CATH LAB     CV CORONARY ANGIOGRAM N/A 7/12/2021    Procedure: Coronary Angiogram;  Surgeon: Fermin Polanco MD;  Location:  HEART CARDIAC CATH LAB     CV HEART CATHETERIZATION WITH POSSIBLE INTERVENTION N/A 2/26/2019    Procedure: CORS;  Surgeon: Jagdish Hoyt MD;  Location:  HEART CARDIAC CATH LAB     CV INTRA AORTIC BALLOON N/A 7/12/2021    Procedure: Intra Aortic Balloon Pump Insertion;  Surgeon: Fermin Polanco MD;  Location:  HEART CARDIAC CATH LAB     ESOPHAGOSCOPY, GASTROSCOPY, DUODENOSCOPY (EGD), COMBINED N/A 11/17/2016    Procedure: COMBINED ESOPHAGOSCOPY, GASTROSCOPY, DUODENOSCOPY (EGD);  Surgeon: Santi Rosas MD;  Location:  GI     ESOPHAGOSCOPY, GASTROSCOPY, DUODENOSCOPY (EGD), COMBINED N/A 11/17/2017    Procedure: COMBINED ESOPHAGOSCOPY, GASTROSCOPY, DUODENOSCOPY (EGD);  EGD;  Surgeon: Santi Rosas MD;  Location:  GI     ESOPHAGOSCOPY, GASTROSCOPY, DUODENOSCOPY (EGD), COMBINED N/A  2018    Procedure: EGD;  Surgeon: Santi Rosas MD;  [facility]     ESOPHAGOSCOPY, GASTROSCOPY, DUODENOSCOPY (EGD), COMBINED N/A 2019    Procedure: ESOPHAGOGASTRODUODENOSCOPY, WITH BIOPSY;  Surgeon: Adam Morton MD;  Location:  GI     ESOPHAGOSCOPY, GASTROSCOPY, DUODENOSCOPY (EGD), COMBINED N/A 2020    Procedure: ESOPHAGOGASTRODUODENOSCOPY (EGD);  Surgeon: Santi Rosas MD;  Location:  GI     HEAD & NECK SURGERY      2017 at [facility].      IMPLANT GOLD WEIGHT EYELID Right 2017    Procedure: IMPLANT WEIGHT EYELID;  Right Upper Eyelid Weight, right tarsal strip lower eyelid;  Surgeon: Milana Malave MD;  [facility] OR     IR CHEMO EMBOLIZATION  2019     KNEE SURGERY Left      ORTHOPEDIC SURGERY       PAROTIDECTOMY, RADICAL NECK DISSECTION Right 2017    Procedure: PAROTIDECTOMY, RADICAL NECK DISSECTION;  Right Superfacial Parotidectomy , Facial nerve repair. with [facility] facial nerve monitor.;  Surgeon: Asiya Morgan MD;  Location: [facility] OR     PICC INSERTION Left 2017    4fr SL BioFlo PICC, 44cm in the L basilic vein w/ tip in the low SVC     RETURN LIVER TRANSPLANT N/A 2019    Procedure: Exploratory laparotomy, hematoma evacuation, abdominal washout;  Surgeon: Александр Ramos MD;  Location: [facility] OR     TRANSPLANT LIVER RECIPIENT  DONOR N/A 2019    Procedure: TRANSPLANT, LIVER, RECIPIENT,  DONOR;  Surgeon: Александр Ramos MD;  Location: [facility] OR     VASCULAR SURGERY       Social History     Socioeconomic History     Marital status:      Spouse name: Not on file     Number of children: Not on file     Years of education: Not on file     Highest education level: Bachelor's degree (e.g., BA, AB, BS)   Occupational History     Not on file   Tobacco Use     Smoking status: Former Smoker     Packs/day: 6.00     Years: 30.00     Pack years: 180.00     Types: Cigars     Start date: 2016     Quit date: 10/25/2017      Years since quittin.2     Smokeless tobacco: Former User     Types: Chew     Quit date: 10/31/2017     Tobacco comment: 1 tin per week   Substance and Sexual Activity     Alcohol use: No     Alcohol/week: 0.0 standard drinks     Comment: quit 1996     Drug use: No     Sexual activity: Not Currently     Partners: Female     Birth control/protection: Condom   Other Topics Concern     Parent/sibling w/ CABG, MI or angioplasty before 65F 55M? Yes   Social History Narrative    [facility]     Social Determinants of Health     Financial Resource Strain: Low Risk      Difficulty of Paying Living Expenses: Not very hard   Food Insecurity: No Food Insecurity     Worried About Running Out of Food in the Last Year: Never true     Ran Out of Food in the Last Year: Never true   Transportation Needs: No Transportation Needs     Lack of Transportation (Medical): No     Lack of Transportation (Non-Medical): No   Physical Activity: Not on file   Stress: Not on file   Social Connections: Not on file   Intimate Partner Violence: Not on file   Housing Stability: Not on file     Family History   Problem Relation Age of Onset     Prostate Cancer Maternal Grandfather      Substance Abuse Maternal Grandfather         Alcohol     Colon Cancer Father 60     Pancreatic Cancer Father 60     Prostate Cancer Father      Colorectal Cancer Father      Macular Degeneration Father      Cancer Father      Glaucoma Father      Skin Cancer Father      Colorectal Cancer Maternal Grandmother      Cancer Maternal Grandmother      Substance Abuse Maternal Grandmother         Alcohol     Colorectal Cancer Paternal Grandmother      Cancer Mother      Diabetes Mother          3/2016     Cerebrovascular Disease Mother         Passed away in Feb of this year, 80 years old.     Thyroid Disease Mother      Depression Mother      Asthma Sister         Had since birth     Thyroid Disease Sister      Depression Sister      Liver  Disease No family hx of      Melanoma No family hx of      Lab Results   Component Value Date    A1C 6.0 01/10/2022    A1C 6.8 07/13/2021    A1C 6.0 12/30/2020    A1C 6.3 06/13/2020    A1C 6.6 08/08/2018    A1C 6.5 06/09/2017    A1C 7.8 10/25/2016     Lab Results   Component Value Date    WBC 2.2 02/03/2022    WBC 4.4 06/28/2021     Lab Results   Component Value Date    RBC 2.14 02/03/2022    RBC 2.35 06/28/2021     Lab Results   Component Value Date    HGB 7.9 02/09/2022    HGB 7.3 06/28/2021     Lab Results   Component Value Date    HCT 25.9 02/09/2022    HCT 22.6 06/28/2021     No components found for: MCT  Lab Results   Component Value Date    MCV 98 02/03/2022    MCV 96 06/28/2021     Lab Results   Component Value Date    MCH 29.4 02/03/2022    MCH 31.1 06/28/2021     Lab Results   Component Value Date    MCHC 30.0 02/03/2022    MCHC 32.3 06/28/2021     Lab Results   Component Value Date    RDW 18.8 02/03/2022    RDW 15.1 06/28/2021     Lab Results   Component Value Date     02/03/2022     06/28/2021     Last Comprehensive Metabolic Panel:  Sodium   Date Value Ref Range Status   02/03/2022 140 133 - 144 mmol/L Final   06/28/2021 137 133 - 144 mmol/L Final     Potassium   Date Value Ref Range Status   02/03/2022 4.4 3.4 - 5.3 mmol/L Final   06/28/2021 4.6 3.4 - 5.3 mmol/L Final     Chloride   Date Value Ref Range Status   02/03/2022 105 94 - 109 mmol/L Final   06/28/2021 107 94 - 109 mmol/L Final     Carbon Dioxide   Date Value Ref Range Status   06/28/2021 25 20 - 32 mmol/L Final     Carbon Dioxide (CO2)   Date Value Ref Range Status   02/03/2022 27 20 - 32 mmol/L Final     Anion Gap   Date Value Ref Range Status   02/03/2022 8 3 - 14 mmol/L Final   06/28/2021 5 3 - 14 mmol/L Final     Glucose   Date Value Ref Range Status   02/03/2022 165 (H) 70 - 99 mg/dL Final   06/28/2021 208 (H) 70 - 99 mg/dL Final     Urea Nitrogen   Date Value Ref Range Status   02/03/2022 29 7 - 30 mg/dL Final   06/28/2021  33 (H) 7 - 30 mg/dL Final     Creatinine   Date Value Ref Range Status   02/03/2022 1.54 (H) 0.66 - 1.25 mg/dL Final   06/28/2021 1.62 (H) 0.66 - 1.25 mg/dL Final     GFR Estimate   Date Value Ref Range Status   02/03/2022 52 (L) >60 mL/min/1.73m2 Final     Comment:     Effective December 21, 2021 eGFRcr in adults is calculated using the 2021 CKD-EPI creatinine equation which includes age and gender (Allie et al., NEJM, DOI: 10.1056/UHOViz0869995)   06/28/2021 46 (L) >60 mL/min/[1.73_m2] Final     Comment:     Non  GFR Calc  Starting 12/18/2018, serum creatinine based estimated GFR (eGFR) will be   calculated using the Chronic Kidney Disease Epidemiology Collaboration   (CKD-EPI) equation.       GFR, ESTIMATED POCT   Date Value Ref Range Status   11/26/2021 47 (L) >60 mL/min/1.73m2 Final     Calcium   Date Value Ref Range Status   02/03/2022 9.0 8.5 - 10.1 mg/dL Final   06/28/2021 9.1 8.5 - 10.1 mg/dL Final     Lab Results   Component Value Date    AST 15 02/03/2022    AST 9 06/28/2021     Lab Results   Component Value Date    ALT 20 02/03/2022    ALT 20 06/28/2021     No results found for: BILICONJ   Lab Results   Component Value Date    BILITOTAL 0.4 02/03/2022    BILITOTAL 0.5 06/28/2021     Lab Results   Component Value Date    ALBUMIN 3.8 02/03/2022    ALBUMIN 4.0 06/28/2021     Lab Results   Component Value Date    PROTTOTAL 6.8 02/03/2022    PROTTOTAL 7.4 06/28/2021      Lab Results   Component Value Date    ALKPHOS 99 02/03/2022    ALKPHOS 98 06/28/2021         Answers for HPI/ROS submitted by the patient on 2/10/2022  General Symptoms: Yes  Skin Symptoms: No  HENT Symptoms: No  EYE SYMPTOMS: Yes  HEART SYMPTOMS: No  LUNG SYMPTOMS: No  INTESTINAL SYMPTOMS: Yes  URINARY SYMPTOMS: No  REPRODUCTIVE SYMPTOMS: No  SKELETAL SYMPTOMS: No  BLOOD SYMPTOMS: Yes  NERVOUS SYSTEM SYMPTOMS: No  MENTAL HEALTH SYMPTOMS: No  Fever: No  Loss of appetite: Yes  Weight loss: Yes  Weight gain: No  Fatigue:  Yes  Night sweats: No  Chills: No  Increased stress: No  Excessive hunger: No  Excessive thirst: No  Feeling hot or cold when others believe the temperature is normal: No  Loss of height: No  Post-operative complications: No  Surgical site pain: No  Hallucinations: No  Change in or Loss of Energy: No  Hyperactivity: No  Confusion: No  Eye pain: Yes  Vision loss: Yes  Dry eyes: No  Watery eyes: Yes  Eye bulging: No  Double vision: No  Flashing of lights: Yes  Spots: Yes  Floaters: Yes  Redness: No  Crossed eyes: No  Tunnel Vision: No  Yellowing of eyes: No  Eye irritation: Yes  Heart burn or indigestion: No  Nausea: Yes  Vomiting: No  Abdominal pain: Yes  Bloating: No  Constipation: Yes  Diarrhea: No  Blood in stool: No  Black stools: No  Rectal or Anal pain: Yes  Fecal incontinence: No  Yellowing of skin or eyes: No  Vomit with blood: No  Change in stools: No  Anemia: Yes  Swollen glands: No  Easy bleeding or bruising: Yes  Edema or swelling: No        SUBJECTIVE FINDINGS:  A 57-year-old male returns to clinic for foot check.  He relates he is doing okay.  Relates he has numbness, tingling and neuropathy in his feet.  Relates no ulcers or sores since we have seen him last.  Relates he needs his toenails trimmed.  No other specific relieving or aggravating factors.  He does have diabetic shoes that he wears.       OBJECTIVE FINDINGS:  DP and PT 2/4 bilaterally.  He has dorsally contracted digits 2-5 bilaterally.  He has incurvated nails with minimal thickening and dystrophy 1-5 bilaterally to differing degrees.  There is no erythema, no drainage, no odor, no calor bilaterally.  Sharp/dull is intact with 5.07 [place] monofilament bilaterally.  Deep tendon reflexes are intact bilaterally.  There are no gross tendon voids bilaterally.  He has a laterally deviated hallux bilaterally.       ASSESSMENT/PLAN:  Onychauxis bilaterally.  He is diabetic with peripheral neuropathy.  His protective sensation is intact.   Diagnosis and treatment options discussed with him.  All the nails were reduced bilaterally upon consent.  Continue Diabetic shoes.  He will return to clinic and see me in about 3 months.  Previous notes reviewed.     Moderate level of medical decision making.    Again, thank you for allowing me to participate in the care of your patient.      Sincerely,    Lamin Gonzalez DPM

## 2022-02-10 NOTE — TELEPHONE ENCOUNTER
Anemia Management Note  SUBJECTIVE/OBJECTIVE:  Referred by Dr. Eloy Rao on 2021  Primary Diagnosis: Anemia in Chronic Kidney Disease (N18.3, D63.1)   3b  Secondary Diagnosis:  Chronic Kidney Disease, Stage 3 (N18.3)  3b  Liver Tx: 2019  Hgb goal range:  9-10  Epo/Darbo: Aranesp 100mcg every 7 days for Hgb <10.  In Clinic.  *22: dose changed to weekly per Dr. Rao  *dose increased to 100mcg starting 22  *dose increased to 60mcg starting with  21 dose  Iron regimen:  NA.  Iron levels stable  Labs : 2022  Recent IVELISSE use, transfusion, IV iron: Aranesp   RX/TX plans :  6/10/2022     No history of stroke, MI and blood clots.  History of hepatocellular carcinoma - s/p TACE 2019 and liver transplant 2019.     Contact:            Ok to leave message regarding scheduling, medical, and billing per consent to communicate dated 10/2/19                              OK to speak with Davi Saunders (friend/POA) regarding scheduling, medical, and billing per consent to communicate dated 10/2/19    Anemia Latest Ref Rng & Units 1/10/2022 2022 2022 2022 2/3/2022 2022 2022   IVELISSE Dose - - - - - 100 mcg - 100 mcg   Hemoglobin 13.3 - 17.7 g/dL 6.9(LL) 7.0(L) 7.9(L) 7.7(L) 6.3(LL) - 7.9(L)   TSAT 15 - 46 % - - - - - - 39   Ferritin 26 - 388 ng/mL - - - - - - 1,046(H)   PRBCs - - 1 unit  - - - 1 unit  -     BP Readings from Last 3 Encounters:   22 97/57   22 111/60   22 95/52     Wt Readings from Last 2 Encounters:   22 154 lb (69.9 kg)   21 176 lb 9.6 oz (80.1 kg)           ASSESSMENT:  Hgb:Not at goal but improving - recommend dose and continue current regimen  TSat: at goal >30% Ferritin: Elevated (>1000ng/mL)    PLAN:  Dose with aranesp and RTC for hgb then aranesp if needed in 1 week(s)    Orders needed to be renewed (for next follow-up date) in EPIC: None    Iron labs due:  12 weeks    Plan discussed with:  No call to pt.        NEXT FOLLOW-UP DATE:  2/16/22    Jie Blum RN   [facility]  [address] [address]   [email or web address]   Office : [phone]  Fax: [phone]

## 2022-02-11 NOTE — PATIENT INSTRUCTIONS
Benzoyl peroxide 5% wash - use daily - this is over the counter and can be found at [employer] [employer], or other pharmacies    Wound Care After a Biopsy    What is a skin biopsy?  A skin biopsy allows the doctor to examine a very small piece of tissue under the microscope to determine the diagnosis and the best treatment for the skin condition. A local anesthetic (numbing medicine)  is injected with a very small needle into the skin area to be tested. A small piece of skin is taken from the area. Sometimes a suture (stitch) is used.     What are the risks of a skin biopsy?  I will experience scar, bleeding, swelling, pain, crusting and redness. I may experience incomplete removal or recurrence. Risks of this procedure are excessive bleeding, bruising, infection, nerve damage, numbness, thick (hypertrophic or keloidal) scar and non-diagnostic biopsy.    How should I care for my wound for the first 24 hours?    Keep the wound dry and covered for 24 hours    If it bleeds, hold direct pressure on the area for 15 minutes. If bleeding does not stop then go to the emergency room    Avoid strenuous exercise the first 1-2 days or as your doctor instructs you    How should I care for the wound after 24 hours?    After 24 hours, remove the bandage    You may bathe or shower as normal    If you had a scalp biopsy, you can shampoo as usual and can use shower water to clean the biopsy site daily    Clean the wound twice a day with gentle soap and water    Do not scrub, be gentle    Apply white petroleum/Vaseline after cleaning the wound with a cotton swab or a clean finger, and keep the site covered with a Bandaid /bandage. Bandages are not necessary with a scalp biopsy    If you are unable to cover the site with a Bandaid /bandage, re-apply ointment 2-3 times a day to keep the site moist. Moisture will help with healing    Avoid strenuous activity for first 1-2 days    Avoid lakes, rivers, pools, and oceans until the stitches are  What Is The Reason For Today's Visit?: History of Melanoma Year Excised?: 2019 removed or the site is healed    How do I clean my wound?    Wash hands thoroughly with soap or use hand  before all wound care    Clean the wound with gentle soap and water    Apply white petroleum/Vaseline  to wound after it is clean    Replace the Bandaid /bandage to keep the wound covered for the first few days or as instructed by your doctor    If you had a scalp biopsy, warm shower water to the area on a daily basis should suffice    What should I use to clean my wound?     Cotton-tipped applicators (Qtips )    White petroleum jelly (Vaseline ). Use a clean new container and use Q-tips to apply.    Bandaids   as needed    Gentle soap     How should I care for my wound long term?    Do not get your wound dirty    Keep up with wound care for one week or until the area is healed.    A small scab will form and fall off by itself when the area is completely healed. The area will be red and will become pink in color as it heals. Sun protection is very important for how your scar will turn out. Sunscreen with an SPF 30 or greater is recommended once the area is healed.    If you have stitches, stitches need to be removed in  days. You may return to our clinic for this or you may have it done locally at your doctor s office.    You should have some soreness but it should be mild and slowly go away over several days. Talk to your doctor about using tylenol for pain,    When should I call my doctor?  If you have increased:     Pain or swelling    Pus or drainage (clear or slightly yellow drainage is ok)    Temperature over 100F    Spreading redness or warmth around wound    When will I hear about my results?  The biopsy results can take 2-3 weeks to come back. The clinic will call you with the results, send you a [facility] message, or have you schedule a follow-up clinic or phone time to discuss the results. Contact our clinics if you do not hear from us in 3 weeks.     Who should I call with  questions?    [facility]: [phone]     [facility]: [phone]    For urgent needs outside of business hours call the [facility] at [phone] and ask for the dermatology resident on call

## 2022-02-16 ENCOUNTER — LAB (OUTPATIENT)
Dept: LAB | Facility: CLINIC | Age: 57
End: 2022-02-16
Attending: INTERNAL MEDICINE
Payer: MEDICARE

## 2022-02-16 ENCOUNTER — ALLIED HEALTH/NURSE VISIT (OUTPATIENT)
Dept: TRANSPLANT | Facility: CLINIC | Age: 57
End: 2022-02-16
Attending: INTERNAL MEDICINE
Payer: MEDICARE

## 2022-02-16 ENCOUNTER — TELEPHONE (OUTPATIENT)
Dept: PHARMACY | Facility: CLINIC | Age: 57
End: 2022-02-16

## 2022-02-16 VITALS — DIASTOLIC BLOOD PRESSURE: 60 MMHG | SYSTOLIC BLOOD PRESSURE: 94 MMHG | HEART RATE: 87 BPM | OXYGEN SATURATION: 100 %

## 2022-02-16 DIAGNOSIS — N18.32 STAGE 3B CHRONIC KIDNEY DISEASE (H): ICD-10-CM

## 2022-02-16 DIAGNOSIS — Z95.1 S/P CABG (CORONARY ARTERY BYPASS GRAFT): ICD-10-CM

## 2022-02-16 DIAGNOSIS — D63.1 ANEMIA OF CHRONIC RENAL FAILURE, STAGE 3B (H): ICD-10-CM

## 2022-02-16 DIAGNOSIS — N18.32 ANEMIA OF CHRONIC RENAL FAILURE, STAGE 3B (H): ICD-10-CM

## 2022-02-16 DIAGNOSIS — N18.32 STAGE 3B CHRONIC KIDNEY DISEASE (H): Primary | ICD-10-CM

## 2022-02-16 LAB
HCT VFR BLD AUTO: 27 % (ref 40–53)
HGB BLD-MCNC: 7.9 G/DL (ref 13.3–17.7)

## 2022-02-16 PROCEDURE — 85018 HEMOGLOBIN: CPT | Performed by: PATHOLOGY

## 2022-02-16 PROCEDURE — 250N000011 HC RX IP 250 OP 636: Performed by: INTERNAL MEDICINE

## 2022-02-16 PROCEDURE — 96372 THER/PROPH/DIAG INJ SC/IM: CPT | Performed by: INTERNAL MEDICINE

## 2022-02-16 PROCEDURE — 36415 COLL VENOUS BLD VENIPUNCTURE: CPT | Performed by: PATHOLOGY

## 2022-02-16 PROCEDURE — 85014 HEMATOCRIT: CPT | Performed by: PATHOLOGY

## 2022-02-16 RX ADMIN — DARBEPOETIN ALFA 100 MCG: 100 INJECTION, SOLUTION INTRAVENOUS; SUBCUTANEOUS at 10:19

## 2022-02-16 NOTE — PROGRESS NOTES
Chief Complaint   Patient presents with     Allied Health Visit     aranesp injection     Blood pressure 94/60, pulse 87, SpO2 100 %.    Frequency: 7 days  Most recent or today's HGB: 7.9   Date:   Date of lat dose: [id number]  HGB associated with last dose given: 7.9    Blood Pressure:94/60    Diagnosis: stage 3 chronic kidney disease/ anemia of chronic renal failure    Ordered by: Александр Mendoza MD  VIS Offered: yes     Double Checked by: Yue Boykin CMA    See MAR for administration details    Pt's first name, last name and  verified prior to medication administration, injection given without complications or questions.

## 2022-02-16 NOTE — TELEPHONE ENCOUNTER
Anemia Management Note  SUBJECTIVE/OBJECTIVE:  Referred by Dr. Eloy Rao on 2021  Primary Diagnosis: Anemia in Chronic Kidney Disease (N18.3, D63.1)   3b  Secondary Diagnosis:  Chronic Kidney Disease, Stage 3 (N18.3)  3b  Liver Tx: 2019  Hgb goal range:  9-10  Epo/Darbo: Aranesp 100mcg every 7 days for Hgb <10.  In Clinic.  *22: dose changed to weekly per Dr. Rao  *dose increased to 100mcg starting 22  *dose increased to 60mcg starting with  21 dose  Iron regimen:  NA.  Iron levels stable  Labs : 2022  Recent IVELISSE use, transfusion, IV iron: Aranesp   RX/TX plans :  6/10/2022     No history of stroke, MI and blood clots.  History of hepatocellular carcinoma - s/p TACE 2019 and liver transplant 2019.     Contact:            Ok to leave message regarding scheduling, medical, and billing per consent to communicate dated 10/2/19                              OK to speak with Davi Saunders (friend/POA) regarding scheduling, medical, and billing per consent to communicate dated 10/2/19       Anemia Latest Ref Rng & Units 2022 2022 2022 2/3/2022 2022 2022 2022   IVELISSE Dose - - - - 100 mcg - 100 mcg 100 mcg   Hemoglobin 13.3 - 17.7 g/dL 7.0(L) 7.9(L) 7.7(L) 6.3(LL) - 7.9(L) 7.9(L)   TSAT 15 - 46 % - - - - - 39 -   Ferritin 26 - 388 ng/mL - - - - - 1,046(H) -   PRBCs - 1 unit  - - - 1 unit  - -     BP Readings from Last 3 Encounters:   22 94/60   22 97/57   22 111/60     Wt Readings from Last 2 Encounters:   22 154 lb (69.9 kg)   21 176 lb 9.6 oz (80.1 kg)           ASSESSMENT:  Hgb:Not at goal/Known  TSat: at goal >30% Ferritin: Elevated (>1000ng/mL)    PLAN:  Dose with aranesp and RTC for hgb then aranesp if needed in 1 week(s)    Orders needed to be renewed (for next follow-up date) in EPIC: None    Iron labs due:  May 2022    Plan discussed with:  No call, chart review      NEXT FOLLOW-UP DATE:  22 10a  appt    Jie Blum RN   [facility]  [address] [address]   [email or web address]   Office : [phone]  Fax: [phone]           History of cholecystectomy    S/P cystoscopy  left ureteral  stent  7/2017  S/P hernia repair  left inguinal hernia repair

## 2022-02-18 ENCOUNTER — TELEPHONE (OUTPATIENT)
Dept: ENDOCRINOLOGY | Facility: CLINIC | Age: 57
End: 2022-02-18
Payer: MEDICARE

## 2022-02-18 NOTE — TELEPHONE ENCOUNTER
[facility] Call Center    Phone Message    May a detailed message be left on voicemail: yes     Reason for Call: Medication Question or concern regarding medication     Prescription Clarification    Name of Medication:   * insulin aspart (NOVOLOG PEN) 100 UNIT/ML pen    Prescribing Provider: Dorcas     Pharmacy: [facility] - [place], [place] - [address]     What on the order needs clarification? Per Bahai is wanting to get a call back in regards to getting clarification on the directions.to inject at bedtime. Bahai states usually this is a mealtime insulin and wanting to clarify the directions. Please advise.       Action Taken: Message routed to:  [facility] Endo    Travel Screening: Not Applicable

## 2022-02-18 NOTE — TELEPHONE ENCOUNTER
Orders copied from previous order. Sent to provider for clarification:       insulin aspart (NOVOLOG PEN) 100 UNIT/ML pen 15 mL 0 2/16/2022  No   Sig - Route: Inject 1-5 Units Subcutaneous At Bedtime MEDIUM INSULIN RESISTANCE DOSING  Do Not give Bedtime Correction Insulin if BG less than  200. For  - 249 give 1 units. For  - 299 give 2 units. For  - 349 give 3 units. For  -399 give 4 units. For BG greater than or equal to 400 give 5 units. Notify provider if glucose greater than or equal to 350 mg/dL after administration of correction dose. If given at mealtime, administer within 30 minutes of start of meal. Approx daily dose 5 units. - Subcutaneous         RE    Reason for Call: Medication Question or concern regarding medication      Prescription Clarification     Name of Medication:   * insulin aspart (NOVOLOG PEN) 100 UNIT/ML pen     Prescribing Provider: Dorcas                 Pharmacy: [facility] - [place], [place] - [address]                 What on the order needs clarification? Per Yarsani is wanting to get a call back in regards to getting clarification on the directions.to inject at bedtime. Yarsani states usually this is a mealtime insulin and wanting to clarify the directions. Please advise.         Action Taken: Message routed to:  [facility]     Travel Screening: Not Applicable

## 2022-02-23 ENCOUNTER — TELEPHONE (OUTPATIENT)
Dept: PHARMACY | Facility: CLINIC | Age: 57
End: 2022-02-23

## 2022-02-23 ENCOUNTER — LAB (OUTPATIENT)
Dept: LAB | Facility: CLINIC | Age: 57
End: 2022-02-23
Attending: INTERNAL MEDICINE
Payer: MEDICARE

## 2022-02-23 ENCOUNTER — ALLIED HEALTH/NURSE VISIT (OUTPATIENT)
Dept: TRANSPLANT | Facility: CLINIC | Age: 57
End: 2022-02-23
Attending: INTERNAL MEDICINE
Payer: MEDICARE

## 2022-02-23 VITALS — SYSTOLIC BLOOD PRESSURE: 128 MMHG | DIASTOLIC BLOOD PRESSURE: 92 MMHG

## 2022-02-23 DIAGNOSIS — D63.1 ANEMIA OF CHRONIC RENAL FAILURE, STAGE 3B (H): ICD-10-CM

## 2022-02-23 DIAGNOSIS — N18.32 STAGE 3B CHRONIC KIDNEY DISEASE (H): Primary | ICD-10-CM

## 2022-02-23 DIAGNOSIS — N18.32 ANEMIA OF CHRONIC RENAL FAILURE, STAGE 3B (H): ICD-10-CM

## 2022-02-23 DIAGNOSIS — N18.32 STAGE 3B CHRONIC KIDNEY DISEASE (H): ICD-10-CM

## 2022-02-23 LAB
HCT VFR BLD AUTO: 30.4 % (ref 40–53)
HGB BLD-MCNC: 9 G/DL (ref 13.3–17.7)

## 2022-02-23 PROCEDURE — 250N000011 HC RX IP 250 OP 636: Mod: EC | Performed by: INTERNAL MEDICINE

## 2022-02-23 PROCEDURE — 85018 HEMOGLOBIN: CPT | Performed by: PATHOLOGY

## 2022-02-23 PROCEDURE — 96372 THER/PROPH/DIAG INJ SC/IM: CPT | Performed by: INTERNAL MEDICINE

## 2022-02-23 PROCEDURE — 36415 COLL VENOUS BLD VENIPUNCTURE: CPT | Performed by: PATHOLOGY

## 2022-02-23 PROCEDURE — 85014 HEMATOCRIT: CPT | Performed by: PATHOLOGY

## 2022-02-23 RX ADMIN — DARBEPOETIN ALFA 100 MCG: 100 INJECTION, SOLUTION INTRAVENOUS; SUBCUTANEOUS at 09:22

## 2022-02-23 NOTE — TELEPHONE ENCOUNTER
LVM for pharmacy with clarification per Tish Toscano and number to call with questions.   Abby Cespedes, RN on 2/23/2022 at 8:22 AM        RE       Tish Toscano PA-C  You 12 hours ago (7:42 PM)     AA    This iscorrection insulin, given in addition to meal time insulin to address any high BG.  At bedtime, only given if BG >200   Does that clarify>   Thanks   Tish     Message text       You routed conversation to Tish Toscano PA-C 5 days ago      You  Tish Toscano PA-C 5 days ago     DM    Please clarify. Thank you.     Routing comment      You 5 days ago     DM       Orders copied from previous order. Sent to provider for clarification:         insulin aspart (NOVOLOG PEN) 100 UNIT/ML pen 15 mL 0 2/16/2022   No   Sig - Route: Inject 1-5 Units Subcutaneous At Bedtime MEDIUM INSULIN RESISTANCE DOSING  Do Not give Bedtime Correction Insulin if BG less than  200. For  - 249 give 1 units. For  - 299 give 2 units. For  - 349 give 3 units. For  -399 give 4 units. For BG greater than or equal to 400 give 5 units. Notify provider if glucose greater than or equal to 350 mg/dL after administration of correction dose. If given at mealtime, administer within 30 minutes of start of meal. Approx daily dose 5 units. - Subcutaneous            RE     Reason for Call: Medication Question or concern regarding medication      Prescription Clarification     Name of Medication:   * insulin aspart (NOVOLOG PEN) 100 UNIT/ML pen     Prescribing Provider: Dorcas                 Pharmacy: [facility] - [place], [place] - [address]                 What on the order needs clarification? Per Omega is wanting to get a call back in regards to getting clarification on the directions.to inject at bedtime. Omega states usually this is a mealtime insulin and wanting to clarify the directions. Please advise.

## 2022-02-23 NOTE — PROGRESS NOTES
Chief Complaint   Patient presents with     Allied Health Visit     Aranesp     Frequency: Every 7 days  Most recent or today's HGB: 9.1   Date: 22  Date of lat dose: 22  HGB associated with last dose given: 7.9  Ok'd per Cristin marion/ [facility].    Blood Pressure:128/92    Diagnosis: Anemia    Ordered by: Eloy Rao MD  VIS Offered: Yes    Double Checked by: Chasity HEATH CMA    See MAR for administration details    Pt's first name, last name and  verified prior to medication administration, injection given without complications or questions.     Nereida Steiner CMA

## 2022-02-23 NOTE — TELEPHONE ENCOUNTER
Anemia Management Note  SUBJECTIVE/OBJECTIVE:  Referred by Dr. Eloy Rao on 2021  Primary Diagnosis: Anemia in Chronic Kidney Disease (N18.3, D63.1)   3b  Secondary Diagnosis:  Chronic Kidney Disease, Stage 3 (N18.3)  3b  Liver Tx: 2019  Hgb goal range:  9-10  Epo/Darbo: Aranesp 100mcg every 7 days for Hgb <10.  In Clinic.  *22: dose changed to weekly per Dr. Rao  *dose increased to 100mcg starting 22  *dose increased to 60mcg starting with  21 dose  Iron regimen:  NA.  Iron levels stable  Labs : 2022  Recent IVELISSE use, transfusion, IV iron: Aranesp   RX/TX plans :  6/10/2022     No history of stroke, MI and blood clots.  History of hepatocellular carcinoma - s/p TACE 2019 and liver transplant 2019.     Contact:            Ok to leave message regarding scheduling, medical, and billing per consent to communicate dated 10/2/19                              OK to speak with Davi Saunders (friend/POA) regarding scheduling, medical, and billing per consent to communicate dated 10/2/19    Anemia Latest Ref Rng & Units 2022 2022 2/3/2022 2022 2022 2022 2022   IVELISSE Dose - - - 100 mcg - 100 mcg 100 mcg 100 mcg   Hemoglobin 13.3 - 17.7 g/dL 7.9(L) 7.7(L) 6.3(LL) - 7.9(L) 7.9(L) 9.0(L)   TSAT 15 - 46 % - - - - 39 - -   Ferritin 26 - 388 ng/mL - - - - 1,046(H) - -   PRBCs - - - - 1 unit  - - -     BP Readings from Last 3 Encounters:   22 94/60   22 97/57   22 111/60     Wt Readings from Last 2 Encounters:   22 154 lb (69.9 kg)   21 176 lb 9.6 oz (80.1 kg)           ASSESSMENT:  Hgb:At goal but rapid rise in hgb (>1pt/2 weeks) - ok to give dose.   TSat: at goal >30% Ferritin: Elevated (>1000ng/mL)    PLAN:  Hgb increased by 1.1 points. Ok to give Aranesp today, Miller had recent PRBCs. Dosed with aranesp 100mcg and RTC for hgb then aranesp if needed in 1 week(s)    Orders needed to be renewed (for next follow-up date) in  EPIC: None    Iron labs due:  May 2022    Plan discussed with:  Nereida GRIMM      NEXT FOLLOW-UP DATE:  3/2/22  8am appt    Jie Blum RN   [facility]  [address] [address]   [email or web address]   Office : [phone]  Fax: [phone]

## 2022-02-28 DIAGNOSIS — N40.1 BENIGN PROSTATIC HYPERPLASIA WITH LOWER URINARY TRACT SYMPTOMS, SYMPTOM DETAILS UNSPECIFIED: Primary | ICD-10-CM

## 2022-03-01 ENCOUNTER — VIRTUAL VISIT (OUTPATIENT)
Dept: BEHAVIORAL HEALTH | Facility: CLINIC | Age: 57
End: 2022-03-01
Payer: MEDICARE

## 2022-03-01 DIAGNOSIS — F41.1 GENERALIZED ANXIETY DISORDER: ICD-10-CM

## 2022-03-01 DIAGNOSIS — F31.31 BIPOLAR 1 DISORDER, DEPRESSED, MILD (H): Primary | ICD-10-CM

## 2022-03-01 PROCEDURE — 90832 PSYTX W PT 30 MINUTES: CPT | Mod: 95 | Performed by: PSYCHOLOGIST

## 2022-03-01 NOTE — PROGRESS NOTES
[facility] Integrated [facility]al [facility]  March 1, 2022      Behavioral Health Clinician Progress Note    Patient Name: Frandy Workman           Service Type: Video visit      Service Location:  Video visit      Session Start Time: 2:04  Session End Time:  2:20      Session Length: 16 - 37      Attendees: Patient    Visit Activities (Refresh list every visit): [facility] Only     Telemedicine Visit: The patient's condition can be safely assessed and treated via synchronous audio and visual telemedicine encounter.      Reason for Telemedicine Visit: COVID-19    Originating Site (Patient Location): Patient's home    Distant Site (Provider Location): Provider Remote Setting    Consent:  The patient/guardian has verbally consented to: the potential risks and benefits of telemedicine (video visit) versus in person care; bill my insurance or make self-payment for services provided; and responsibility for payment of non-covered services.     Mode of Communication:  Video Conference via [employer]    As the provider I attest to compliance with applicable laws and regulations related to telemedicine.    Diagnostic Assessment Date:  12/03/2020  Treatment Plan Review Date:  6/1/2022  See Flowsheets for today's PHQ-9 and LIZZETTE-7 results  Previous PHQ-9:   PHQ-9 SCORE 9/30/2021 1/4/2022 2/8/2022   PHQ-9 Total Score MyChart - 4 (Minimal depression) 4 (Minimal depression)   PHQ-9 Total Score 10 4 4     Previous LIZZETTE-7:   LIZZETTE-7 SCORE 12/29/2020 4/7/2021 9/30/2021   Total Score 8 (mild anxiety) 9 (mild anxiety) -   Total Score 8 9 10       Extended Session (60+ minutes): No  Interactive Complexity: No  Crisis: No    Treatment Objective(s) Addressed in This Session:  Target Behavior(s): disease management/lifestyle changes  related to adaptive approaches to managing anxious distress and mood difficulties      Current Stressors / Issues:  [facility] met with Miller today for a follow-up. Brief session. Reports  continuing to do what he can each day despite feeling ill and having low energy/fatigue. Reports he mostly attends medical appointments and spends a good deal of the day resting/sleeping. Miller notes his mood can be affected as a result. Discussed building into his day small, achievable goals to help improve mood. Explored the notion of pacing and doing what he can each day, even if it is small things, like going outside for a few minutes, organizing paperwork, or sorting his mail. Additionally provided supportive counseling.     Progress on Treatment Objective(s) / Homework:  Stable - MAINTENANCE (Working to maintain change, with risk of relapse); Intervened by continuing to positively reinforce healthy behavior choice       Solution-Focused Therapy    Explore patterns in patient's relationships and discuss options for new behaviors.    Explore patterns in patient's actions and choices and discuss options for new behaviors.    Supportive Psychotherapy      Answers for HPI/ROS submitted by the patient on 2/8/2022  If you checked off any problems, how difficult have these problems made it for you to do your work, take care of things at home, or get along with other people?: Somewhat difficult  PHQ9 TOTAL SCORE: 4      Answers for HPI/ROS submitted by the patient on 4/7/2021   LIZZETTE 7 TOTAL SCORE: 9  If you checked off any problems, how difficult have these problems made it for you to do your work, take care of things at home, or get along with other people?: Very difficult  PHQ9 TOTAL SCORE: 7*    *No SI    Answers for HPI/ROS submitted by the patient on 10/13/2020   If you checked off any problems, how difficult have these problems made it for you to do your work, take care of things at home, or get along with other people?: Somewhat difficult  PHQ9 TOTAL SCORE: 8*  LIZZETTE 7 TOTAL SCORE: 6      *No SI     Answers for HPI/ROS submitted by the patient on 12/29/2020   If you checked off any problems, how difficult have these  problems made it for you to do your work, take care of things at home, or get along with other people?: Somewhat difficult  PHQ9 TOTAL SCORE: 5*  LIZZETTE 7 TOTAL SCORE: 8    *No SI       Care Plan review completed: yes    Medication Review:  No changes to current psychiatric medication(s)     Current Outpatient Medications   Medication     Acetaminophen (TYLENOL) 325 MG CAPS     ARIPiprazole (ABILIFY) 5 MG tablet     aspirin (SM ASPIRIN ADULT LOW STRENGTH) 81 MG EC tablet     BD VIKTORIA U/F 32G X 4 MM insulin pen needle     ciclopirox (LOPROX) 0.77 % cream     Continuous Blood Gluc  (FREESTYLE CLAUDIA 14 DAY READER) CECILIO     Continuous Blood Gluc Sensor (FREESTYLE CLAUDIA 2 SENSOR) MISC     cyanocobalamin (VITAMIN B-12) 1000 MCG tablet     empagliflozin (JARDIANCE) 10 MG TABS tablet     insulin aspart (NOVOLOG PEN) 100 UNIT/ML pen     insulin degludec (TRESIBA FLEXTOUCH) 100 UNIT/ML pen     lamiVUDine (EPIVIR) 100 MG tablet     lisinopril (ZESTRIL) 5 MG tablet     methocarbamol (ROBAXIN) 750 MG tablet     metoprolol succinate ER (TOPROL-XL) 25 MG 24 hr tablet     Multiple Vitamin (TAB-A-GLADIS) TABS     mycophenolate (GENERIC EQUIVALENT) 250 MG capsule     order for DME     oxyCODONE (ROXICODONE) 5 MG tablet     pantoprazole (PROTONIX) 40 MG EC tablet     polyethylene glycol (MIRALAX) 17 g packet     predniSONE (DELTASONE) 5 MG tablet     rosuvastatin (CRESTOR) 5 MG tablet     senna-docusate (SENOKOT-S/PERICOLACE) 8.6-50 MG tablet     tamsulosin (FLOMAX) 0.4 MG capsule     vitamin D3 (CHOLECALCIFEROL) 50 mcg (2000 units) tablet     Current Facility-Administered Medications   Medication     aflibercept (EYLEA) injection prefilled syringe 2 mg     aflibercept (EYLEA) injection prefilled syringe 2 mg         Medication Compliance:  Yes    Changes in Health Issues:   None reported    Chemical Use Review:   Substance Use: Chemical use reviewed, no active concerns identified      Tobacco Use: No current tobacco use.          Assessment: Current Emotional / Mental Status (status of significant symptoms):  Risk status (Self / Other harm or suicidal ideation)  Patient denies a history of suicidal ideation, suicide attempts, self-injurious behavior, homicidal ideation, homicidal behavior and and other safety concerns     Patient denies current fears or concerns for personal safety.  Patient denies current or recent suicidal ideation or behaviors.  Patient denies current or recent homicidal ideation or behaviors.  Patient denies current or recent self injurious behavior or ideation.  Patient denies other safety concerns.     A safety and risk management plan has not been developed at this time, however patient was encouraged to call [place] [phone] should there be a change in any of these risk factors.    Dodge Suicide Severity Rating Scale (Short Version)  Dodge Suicide Severity Rating (Short Version) 11/10/2019 12/2/2019 12/10/2019 6/11/2020 7/1/2020 7/12/2021 1/10/2022   Over the past 2 weeks have you felt down, depressed, or hopeless? no yes yes no no no no   Over the past 2 weeks have you had thoughts of killing yourself? no yes no no no no no   Comments - lots of stressors, has thought about not taking meds - - - - -   Have you ever attempted to kill yourself? no no no no no no no   Q1 Wished to be Dead (Past Month) - other (see comments) no - - - -   Comments - why did i do this surgery - - - - -   Q2 Suicidal Thoughts (Past Month) - no no - - - -   Comments - wishes he wouldn't have gone through surgery - - - - -   Q3 Suicidal Thought Method - no no - - - -   Q4 Suicidal Intent without Specific Plan - no no - - - -   Q5 Suicide Intent with Specific Plan - no no - - - -   Q6 Suicide Behavior (Lifetime) - no no - - - -         Appearance:   Appropriate   Eye Contact:   Good   Psychomotor Behavior: Restless   Attitude:   Cooperative  Interested Friendly Pleasant  Orientation:   All  Speech   Rate / Production: Normal/  Responsive   Volume:  Normal   Mood:    Normal  Affect:    Appropriate    Thought Content:  Clear   Thought Form:  Goal Directed  Logical   Insight:    Good     Diagnoses:  1. Bipolar 1 disorder, depressed, mild (H)    2. Generalized anxiety disorder          Collateral Reports Completed:  Not Applicable    Plan: (Homework, other):  Continue with this writer for individual psychotherapy in three weeks. He will continue to work on managing depression and anxiety through achievable behavioral activation ideas presented today. No CD issues.     Joseph Murillo, RED  March 1, 2022            ______________________________________________________________________                                            Individual Treatment Plan    Patient's Name: Frandy Workman  YOB: 1965    Date of Creation: 3/1/2022  Date Treatment Plan Last Reviewed/Revised: 3/1/2022  DSM5 Diagnoses: 296.51 Bipolar I Disorder Current or Most Recent Episode Depressed, Mild or 300.02 (F41.1) Generalized Anxiety Disorder  Psychosocial / Contextual Factors: Post Solid Organ Tx  PROMIS (reviewed every 90 days): not on file yet    Referral / Collaboration:  Referral to another professional/service is not indicated at this time..    Anticipated number of session for this episode of care: 10+  Anticipation frequency of session: Every other week  Anticipated Duration of each session: 38-52 minutes  Treatment plan will be reviewed in 90 days or when goals have been changed.       MeasurableTreatment Goal(s) related to diagnosis / functional impairment(s)  Goal 1: Patient will experience a reduction in depressive symptoms along with a corresponding increase in positive emotion and life satisfaction.      Objective #A (Patient Action)    Patient will Increase interest, engagement, and pleasure in doing things.  Status: Continued - Date(s): March 1, 2022    Intervention(s)  Therapist will help patient identify pleasant and mastery oriented events  "that elicit positive, relaxed mood.    Objective #B  Patient will Decrease frequency and intensity of feeling down, depressed, hopeless.  Status: Continued - Date(s): March 1, 2022    Intervention(s)  Therapist will introduce patient to cognitive-behavioral and acceptance and commitment therapy topics aimed to help reduce depression and anxiety    Objective #C  Patient will Identify negative self-talk and behaviors: challenge core beliefs, myths, and actions  Improve concentration, focus, and mindfulness in daily activities .  Status: Continued - Date(s): March 1, 2022    Intervention(s)  Therapist will help patient identify and manage negative self-talk and automatic thoughts; introduce patient to cognitive distortions; help patient develop cognitive diffusion techniques      Goal 2: Patient will experience a reduction in anxious symptoms, along with a corresponding increase in relaxed emotional states and life satisfaction.      Objective #A (Patient Action)  Patient will use cognitive-behavioral and thought diffusion strategies identified in therapy to challenge anxious thoughts.    Status: Continued - Date(s): March 1, 2022    Intervention(s)  Therapist will utilize CBT and ACT ideas to help patient challenge anxious thoughts and reduce intensity/duration of anxious distress    Objective #B  Patient will use \"worry time\" each day for 15 minutes of scheduled worry and then defer obsessive or anxious thinking until the next structured worry time.    Status: Continued - Date(s): March 1, 2022    Intervention(s)  Therapist will teach patient how to effectively utilize worry time and/or thought logs/journals each day and incorporate more relaxation behaviors into their routine.    Objective #C  Patient will identify the stressors which contribute to feelings of anxiety  Patient will increase engagement in adaptive coping skills and recreational activities, such as exercise and healthy socialization, to manage " distress.  Status: Continued - Date(s): March 1, 2022    Intervention(s)  Therapist will help patient identify triggers/situational factors that contribute to anxiety and behavioral skills aimed to manage anxious distress.      Goal 3: Patiient will work toward adaptively managing bipolar-related depression and manic episodes      Objective #A (Patient Action)    Status: Continued - Date(s): March 1, 2022    Patient will identify 2-3 signs or signals of emerging mood instability.    Intervention(s)  Therapist will provide educational materials on bipolar disorder and help identifying triggers for mood instability.    Objective #B  Patient will identify 2-3 strategies for more effectively managing Bipolar Disorder.    Status: Continued - Date(s): March 1, 2022    Intervention(s)  Therapist will teach emotional recognition/identification. Skills aimed to effectively manage bipolar disorder.      Other Possible Therapeutic Intervention(s):    Psycho-education regarding mental health diagnoses and treatment options    Supportive Therapy    Provide affirmations, reflections, and establish working rapport    Emphasize and reflect on strength of therapeutic relationship    Skills training    Explore skills useful to client in current situation.    Skills include assertiveness, communication, conflict management, problem-solving, relaxation, etc.    Solution-Focused Therapy    Explore patterns in patient's relationships and discuss options for new behaviors.    Explore patterns in patient's actions and choices and discuss options for new behaviors.    Cognitive-behavioral Therapy    Discuss common cognitive distortions, identify them in patient's life.    Explore ways to challenge, replace, and act against these cognitions.    Acceptance and Commitment Therapy    Explore and identify important values in patient's life.    Discuss ways to commit to behavioral activation around these values.    Psychodynamic  psychotherapy    Discuss patient's emotional dynamics and issues and how they impact behaviors.    Explore patient's history of relationships and how they impact present behaviors.    Explore how to work with and make changes in these schemas and patterns.    Narrative Therapy    Explore the patient's story of his/her life from his/her perspective.    Explore alternate ways of understanding their experience, identifying exceptions, developing new themes.    Interpersonal Psychotherapy    Explore patterns in relationships that are effective or ineffective at helping patient reach their goals, find satisfying experience.    Discuss new patterns or behaviors to engage in for improved social functioning.    Behavioral Activation    Discuss steps patient can take to become more involved in meaningful activity.    Identify barriers to these activities and explore possible solutions.    Mindfulness-Based Strategies    Discuss skills based on development and application of mindfulness.    Skills drawn from compassion-focused therapy, dialectical behavior therapy, mindfulness-based stress reduction, mindfulness-based cognitive therapy, etc.      Patient has reviewed and agreed to the above plan.      Joseph Murillo Psy.D, LP   Behavioral Health Clinician   -[facility]     March 1, 2022

## 2022-03-02 ENCOUNTER — ALLIED HEALTH/NURSE VISIT (OUTPATIENT)
Dept: TRANSPLANT | Facility: CLINIC | Age: 57
End: 2022-03-02
Attending: INTERNAL MEDICINE
Payer: MEDICARE

## 2022-03-02 ENCOUNTER — LAB (OUTPATIENT)
Dept: LAB | Facility: CLINIC | Age: 57
End: 2022-03-02
Attending: INTERNAL MEDICINE
Payer: MEDICARE

## 2022-03-02 ENCOUNTER — TELEPHONE (OUTPATIENT)
Dept: PHARMACY | Facility: CLINIC | Age: 57
End: 2022-03-02

## 2022-03-02 VITALS — SYSTOLIC BLOOD PRESSURE: 104 MMHG | DIASTOLIC BLOOD PRESSURE: 62 MMHG

## 2022-03-02 DIAGNOSIS — D63.1 ANEMIA OF CHRONIC RENAL FAILURE, STAGE 3B (H): ICD-10-CM

## 2022-03-02 DIAGNOSIS — N18.32 ANEMIA OF CHRONIC RENAL FAILURE, STAGE 3B (H): ICD-10-CM

## 2022-03-02 DIAGNOSIS — N18.32 STAGE 3B CHRONIC KIDNEY DISEASE (H): Primary | ICD-10-CM

## 2022-03-02 DIAGNOSIS — N18.32 STAGE 3B CHRONIC KIDNEY DISEASE (H): ICD-10-CM

## 2022-03-02 LAB
HCT VFR BLD AUTO: 29.1 % (ref 40–53)
HGB BLD-MCNC: 8.6 G/DL (ref 13.3–17.7)

## 2022-03-02 PROCEDURE — 85014 HEMATOCRIT: CPT | Performed by: PATHOLOGY

## 2022-03-02 PROCEDURE — 36415 COLL VENOUS BLD VENIPUNCTURE: CPT | Performed by: PATHOLOGY

## 2022-03-02 PROCEDURE — 85018 HEMOGLOBIN: CPT | Performed by: PATHOLOGY

## 2022-03-02 NOTE — TELEPHONE ENCOUNTER
Anemia Management Note  SUBJECTIVE/OBJECTIVE:  Referred by Dr. Eloy Rao on 2021  Primary Diagnosis: Anemia in Chronic Kidney Disease (N18.3, D63.1)   3b  Secondary Diagnosis:  Chronic Kidney Disease, Stage 3 (N18.3)  3b  Liver Tx: 2019  Hgb goal range:  9-10  Epo/Darbo: Aranesp 100mcg every 7 days for Hgb <10.  In Clinic.  *22: dose changed to weekly per Dr. Rao  *dose increased to 100mcg starting 22  *dose increased to 60mcg starting with  21 dose  Iron regimen:  NA.  Iron levels stable  Labs : 2022  Recent IVELISSE use, transfusion, IV iron: Aranesp   RX/TX plans :  6/10/2022     No history of stroke, MI and blood clots.  History of hepatocellular carcinoma - s/p TACE 2019 and liver transplant 2019.     Contact:            Ok to leave message regarding scheduling, medical, and billing per consent to communicate dated 10/2/19                              OK to speak with Davi Saunders (friend/POA) regarding scheduling, medical, and billing per consent to communicate dated 10/2/19    Anemia Latest Ref Rng & Units 2022 2/3/2022 2022 2022 2022 2022 3/2/2022   IVELISSE Dose - - 100 mcg - 100 mcg 100 mcg 100 mcg 100 mcg   Hemoglobin 13.3 - 17.7 g/dL 7.7(L) 6.3(LL) - 7.9(L) 7.9(L) 9.0(L) 8.6(L)   TSAT 15 - 46 % - - - 39 - - -   Ferritin 26 - 388 ng/mL - - - 1,046(H) - - -   PRBCs - - - 1 unit  - - - -     BP Readings from Last 3 Encounters:   22 104/62   22 (!) 128/92   22 94/60     Wt Readings from Last 2 Encounters:   22 154 lb (69.9 kg)   21 176 lb 9.6 oz (80.1 kg)           ASSESSMENT:  Hgb:Not at goal/Known  TSat: at goal >30% Ferritin: Elevated (>1000ng/mL)    PLAN:  Dose with aranesp and RTC for hgb then aranesp if needed in 1 week(s)    Orders needed to be renewed (for next follow-up date) in EPIC: None    Iron labs due:  May 2022    Plan discussed with:  No call      NEXT FOLLOW-UP DATE:  3/9/22 11a  appt    Jie Blum RN   [facility]  [address] [address]   [email or web address]   Office : [phone]  Fax: [phone]

## 2022-03-02 NOTE — PROGRESS NOTES
Frequency: 7 days  Most recent or today's HGB: 8.6    Date:   Date of lat dose: 9.0  HGB associated with last dose given: 2022    Blood Pressure:104/62    Diagnosis: stage 3 Chronic kidney disease/anemia of chronic renal failure    Ordered by:Ubaldo Rao MD  VIS Offered: yes    Double Checked by: CHIVO GRIMM    See MAR for administration details    Pt's first name, last name and  verified prior to medication administration, injection given without complications or questions.     Chasity Acosta CMA

## 2022-03-03 RX ORDER — TAMSULOSIN HYDROCHLORIDE 0.4 MG/1
0.4 CAPSULE ORAL DAILY
Qty: 90 CAPSULE | Refills: 3 | Status: SHIPPED | OUTPATIENT
Start: 2022-03-03 | End: 2023-03-22

## 2022-03-09 ENCOUNTER — TELEPHONE (OUTPATIENT)
Dept: PHARMACY | Facility: CLINIC | Age: 57
End: 2022-03-09

## 2022-03-09 ENCOUNTER — LAB (OUTPATIENT)
Dept: LAB | Facility: CLINIC | Age: 57
End: 2022-03-09
Payer: MEDICARE

## 2022-03-09 ENCOUNTER — ALLIED HEALTH/NURSE VISIT (OUTPATIENT)
Dept: TRANSPLANT | Facility: CLINIC | Age: 57
End: 2022-03-09
Payer: MEDICARE

## 2022-03-09 VITALS — SYSTOLIC BLOOD PRESSURE: 98 MMHG | HEART RATE: 80 BPM | DIASTOLIC BLOOD PRESSURE: 58 MMHG

## 2022-03-09 DIAGNOSIS — N18.32 ANEMIA OF CHRONIC RENAL FAILURE, STAGE 3B (H): ICD-10-CM

## 2022-03-09 DIAGNOSIS — N18.32 STAGE 3B CHRONIC KIDNEY DISEASE (H): ICD-10-CM

## 2022-03-09 DIAGNOSIS — N18.32 STAGE 3B CHRONIC KIDNEY DISEASE (H): Primary | ICD-10-CM

## 2022-03-09 DIAGNOSIS — D63.1 ANEMIA OF CHRONIC RENAL FAILURE, STAGE 3B (H): ICD-10-CM

## 2022-03-09 LAB
HCT VFR BLD AUTO: 32.1 % (ref 40–53)
HGB BLD-MCNC: 9.5 G/DL (ref 13.3–17.7)

## 2022-03-09 PROCEDURE — 96372 THER/PROPH/DIAG INJ SC/IM: CPT | Performed by: INTERNAL MEDICINE

## 2022-03-09 PROCEDURE — 36415 COLL VENOUS BLD VENIPUNCTURE: CPT | Performed by: PATHOLOGY

## 2022-03-09 PROCEDURE — 85014 HEMATOCRIT: CPT | Performed by: PATHOLOGY

## 2022-03-09 PROCEDURE — 250N000011 HC RX IP 250 OP 636: Performed by: INTERNAL MEDICINE

## 2022-03-09 PROCEDURE — 85018 HEMOGLOBIN: CPT | Performed by: PATHOLOGY

## 2022-03-09 RX ADMIN — DARBEPOETIN ALFA 100 MCG: 100 INJECTION, SOLUTION INTRAVENOUS; SUBCUTANEOUS at 10:51

## 2022-03-09 NOTE — PROGRESS NOTES
Diabetes Virtual Consult Note  June 29, 2021       HPI:  Frandy Workman is a 57 year old male with a past medical history including HTN, HLD, CAD s/p 2 vessel CABG 7/2021, liver cirrhosis 2/2 ESTRADA c/b HCC and PVT s/p liver transplant 11/2019, CKD stage III, chronic anemia on aranesp, IDDM2, BPH, depressive disorder, bipolar disorder who is here for f/u of his insulin dependent type 2 diabetes.      His type 2 diabetes is complicated by CVD, CKD3, chronic anemia, B pedal neuropathy, erectile dysfunction, recurrent deopression and NPDMR, now with B macular edema.      His more recent diabetes care includes:     January 2021: advised to increase his Tresiba to 28 units daily with a goal of fasting blood sugar 90-1 40 as advised to increase his Jardiance from 10 mg to 20 mg daily at the time of renewal of this prescription.  He also continued to complain of sweating night sweats and was awaiting a CT scheduled for March 26 as well as seeing psychiatry in regards to this.  We also attempted to adjust his camilo prescription as he was running out at the end of every month.  His average blood glucose at that time was 180.    March 2021: Blood glucose was slightly above goal and he was struggling to keep NovoLog and camilo consistently available. We communicated with pharmacy liaison in hopes of adjusting this. His self-management goal was to more consistently give carbohydrate coverage 1 unit per 15 g ahead of meals. He also planned to continue giving correction of NovoLog 1 unit per 50 greater than 150 prior to meals empagliflozin 10 mg daily, Tresiba 28 units /day and work on getting some physical activity in really each day as well as adding in some resistance training.    July 2021:  Night sweats resolved.  Advised and agreed to work on giving 1u:15 g Humalog 10 - 15 minutes before each meal.     Nov 2021: Increased his exercise and estimated A1C 6.7, but having some limited hypoglycemia, drinking juice every night  and with exercise to counteract.   Thus advised to reduce Tresiba to 26 units daily, Reduce Novolog to 1u :20 g carb, continue adding 1 extra unit Novolog if premeal BG >180 and continue Empagliflozin 10 mg daily.      Interim:  Admitted on 1/10/2022 for acute on chronic anemia, JERONIMO, and chest pain. On admission EKG without signs of ischemia and troponin negative. A TTE was done which showed no new WMA and preserved function as well as signs of hypovolemia. On exam it was noted that chest pain was reproducible upon palpation supporting MSK in nature. He was started on IVF with improvement in his kidney function and also given a unit of blood. Primary team  suspected his presentation was multifactorial from anemia, jeronimo and hypovolemia in the setting of decreased PO intake.    Saw cody Jan 2022, has B macular edema. Dr. Gonzalez Feb 2022, pongoing neuropathy, protective sensation remains intact.      GFR Estimate   Date Value Ref Range Status   02/03/2022 52 (L) >60 mL/min/1.73m2 Final       Hemoglobin   Date Value Ref Range Status   03/09/2022 9.5 (L) 13.3 - 17.7 g/dL Final   06/28/2021 7.3 (L) 13.3 - 17.7 g/dL Final   ]    Today:    Miller reports feels diabetes management going okay.  No concerns.  Trying to stay on top on disease.  He has had some lows.  Lowest was 70.  He did feel warm with this and knew he was low.  Takes orange juice and checks in 15 minutes and always comes up. He has been trying walk more lately but very tired.  Walking 1/2 mile every other day.  Trying to eat enough.  Has Eggos waffles for breakfast, has smart balance pasta a lot for dinner.  Has some fruits and vegetables.  Does take an MVI.  Drinks 1% milk about 12 oz daily. He does eat a good deal of black beans, varela beans.      He does give insulin dose 5-10 minutes before he eats. Getting supplies and meds regularly but without a meter 3 days every month.  Needs batteries for his glucometer.    Denies lightheaded when stands, but does  get tired.        Dm Regimen:   - Tresiba 26 units /day.   -  carbohydrate coverage to 1 unit : 20 g of carbohydrate.   -  Correction Novolounit Novolo mg /dl >180     - Empagliflozin 10 mg daily.      We reviewed glucometer, pump and CGMS data together.  It revealed:      Limited data, average BG increased from 140, but hypoglycemia eliminated, rather stable post prandial excursions.          History of Diabetes monitoring and complications/ prevention:  CAD: yes 2019  Last eye exam results:Treated for NPDMR - last seen 3/3/21  Microalbuminuria: yes - has CKD, not currently on Ace or ARB, sees Cardiology , Nephrology.  Is on Jardiance, understands to hold if possible dehydration.    HTN: past - on coreg  On statin: yes  On ASA:yes  Depression:yes with anxiety and BPAD - sees psych  Neuropathy: yes, wears diabetic shoes. Protective sensation intact.  Sees Dr Gonzalez.   PVD: not clear.   ED:yes    Frandy's PMH reviewed with him.      Current Outpatient Medications   Medication     Acetaminophen (TYLENOL) 325 MG CAPS     ARIPiprazole (ABILIFY) 5 MG tablet     aspirin (SM ASPIRIN ADULT LOW STRENGTH) 81 MG EC tablet     BD VIKTORIA U/F 32G X 4 MM insulin pen needle     ciclopirox (LOPROX) 0.77 % cream     Continuous Blood Gluc  (FREESTYLE CLAUDIA 14 DAY READER) CECILIO     Continuous Blood Gluc Sensor (FREESTYLE CLAUDIA 2 SENSOR) MISC     cyanocobalamin (VITAMIN B-12) 1000 MCG tablet     empagliflozin (JARDIANCE) 10 MG TABS tablet     insulin aspart (NOVOLOG PEN) 100 UNIT/ML pen     insulin degludec (TRESIBA FLEXTOUCH) 100 UNIT/ML pen     lamiVUDine (EPIVIR) 100 MG tablet     lisinopril (ZESTRIL) 5 MG tablet     methocarbamol (ROBAXIN) 750 MG tablet     metoprolol succinate ER (TOPROL-XL) 25 MG 24 hr tablet     Multiple Vitamin (TAB-A-GLADIS) TABS     mycophenolate (GENERIC EQUIVALENT) 250 MG capsule     order for DME     oxyCODONE (ROXICODONE) 5 MG tablet     pantoprazole (PROTONIX) 40 MG EC tablet      "polyethylene glycol (MIRALAX) 17 g packet     predniSONE (DELTASONE) 5 MG tablet     rosuvastatin (CRESTOR) 5 MG tablet     senna-docusate (SENOKOT-S/PERICOLACE) 8.6-50 MG tablet     tamsulosin (FLOMAX) 0.4 MG capsule     vitamin D3 (CHOLECALCIFEROL) 50 mcg (2000 units) tablet     Current Facility-Administered Medications   Medication     aflibercept (EYLEA) injection prefilled syringe 2 mg     aflibercept (EYLEA) injection prefilled syringe 2 mg              Yehuda family history includes Asthma in his sister; Cancer in his father, maternal grandmother, and mother; Cerebrovascular Disease in his mother; Colon Cancer (age of onset: 60) in his father; Colorectal Cancer in his father, maternal grandmother, and paternal grandmother; Depression in his mother and sister; Diabetes in his mother; Glaucoma in his father; Macular Degeneration in his father; Pancreatic Cancer (age of onset: 60) in his father; Prostate Cancer in his father and maternal grandfather; Skin Cancer in his father; Substance Abuse in his maternal grandfather and maternal grandmother; Thyroid Disease in his mother and sister.    ROS:   Patient denies any fevers, chills or sweats as well as any changes in or problems with vision, pain or problems with dentition, new or different headaches.  Patient denies symptoms of hypo and hyperglycemia except as above.   Patients denies marked fatigue, cough, shortness of breath, chest pain or pressure.  There has been no pain with or other changes in urination or  itching or pain in genital areas.  Patient denies any noted swelling in feet, ankles or otherwise, loss of sensation or pain  in feet or other areas.    Patient also denies current difficulties with depressed mood, anhedonia or worrying too much.        Exam:      /63 (BP Location: Left arm, Patient Position: Sitting, Cuff Size: Adult Regular)   Pulse 79   Ht 1.778 m (5' 10\")   Wt 72.6 kg (160 lb)   BMI 22.96 kg/m      Wt Readings from Last " "10 Encounters:   03/15/22 72.6 kg (160 lb)   01/13/22 69.9 kg (154 lb)   12/07/21 80.1 kg (176 lb 9.6 oz)   10/04/21 77.6 kg (171 lb)   07/30/21 70.6 kg (155 lb 9.6 oz)   07/27/21 73.5 kg (162 lb)   07/22/21 73.5 kg (162 lb)   05/05/21 78.8 kg (173 lb 11.2 oz)   11/11/20 78.5 kg (173 lb)   10/28/20 78.2 kg (172 lb 6.4 oz)         Frandy is alerted and oriented.  Neatly groomed and articulate.    Mood is \"good,\" affect is congruent.  Thoughtful form and content are fluid and coherent.  No signs of distress are appreciated.    GENERAL: Healthy, alert and no distress.  Clean shaven.    EYES: Eyes grossly normal to inspection.  No discharge or erythema, or obvious scleral/conjunctival abnormalities.  RESP: No audible wheeze, cough, or visible cyanosis.  No visible retractions or increased work of breathing.    SKIN: Visible skin clear. No significant rash, abnormal pigmentation or lesions.  NEURO: Cranial nerves grossly intact.  Mentation and speech appropriate for age.  PSYCH: Mentation appears normal, affect normal/bright, judgement and insight intact, normal speech and appearance well-groomed.      Data:      Most recent:  Recent Labs   Lab Test 02/03/22  0904 01/13/22  0724 01/11/22  2200 01/10/22  2142 11/26/21  0747 10/04/21  0804 10/04/21  0802 09/24/21  0912 09/07/21  1025 07/14/21  0337 07/13/21  0404 03/26/21  0826 02/26/21  0750 02/26/21  0743 01/28/21  1209 10/14/20  0836 09/25/20  0859 03/09/20  0823 03/04/20  0000 12/02/19  1034 12/02/19  0000 08/08/18  1254 08/08/18  1246   A1C  --   --   --  6.0*  --   --   --   --   --   --  6.8*  --   --   --   --    < >  --    < >  --   --   --   --  6.6*   HEMOGLOBINA1  --   --   --   --   --   --   --   --   --   --   --   --   --   --   --   --   --   --  4.8  --  5.1   < >  --    TSH  --   --   --   --   --   --  1.90  --   --   --   --   --   --   --  0.91  --   --   --   --    < >  --   --  0.49   T4  --   --   --   --   --   --   --   --   --   --   --   --   " --   --   --   --   --   --   --   --   --   --  1.21   LDL  --   --   --   --   --   --   --   --  95  --   --   --   --   --   --   --  61   < >  --   --   --    < > 68   HDL  --   --   --   --   --   --   --   --  34*  --   --   --   --   --   --   --  39*   < >  --   --   --    < > 30*   TRIG  --   --   --   --   --   --   --   --  233*  --   --   --   --   --   --   --  228*   < >  --   --   --    < > 172*   CR 1.54* 1.35*   < > 2.21*   < >  --  1.26*   < >  --    < > 1.45*   < >  --    < >  --    < > 1.48*   < >  --    < >  --    < >  --    MICROL  --   --   --   --   --  1,020  --   --   --   --   --   --  563  --   --   --   --    < >  --    < >  --    < >  --     < > = values in this interval not displayed.       GFR Estimate   Date Value Ref Range Status   02/03/2022 52 (L) >60 mL/min/1.73m2 Final     Lab Results   Component Value Date    MICROL 196 06/29/2020       No results found for: CPEPT, GADAB, ISCAB      Assessment/Plan:    Frandy solomon  is a 57 year old male with a past medical history including HTN, HLD, CAD s/p 2 vessel CABG 7/2021, liver cirrhosis 2/2 ESTRADA c/b HCC and PVT s/p liver transplant 11/2019, CKD stage III, chronic anemia on aranesp, IDDM2, BPH, depressive disorder, bipolar disorder who is here for f/u of his insulin dependent type 2 diabetes.      His type 2 diabetes is complicated by CVD, CKD3, chronic anemia, B pedal neuropathy, erectile dysfunction, recurrent deopression and NPDMR, now with B macular edema.      1. DM2, blood sugar     Recommend:  Continue Tresiba to 26 units daily.  Continue Novolog to 1u :20 g carb  Continue adding 1 extra unit Novolog if premeal BG >180.    Continue Empagliflozin 10 mg daily.      RTC 6 months      2. DM Complications-     Lifestyle modification focusing on application of a Mediterranean diet or Dietary Approaches to Stop Hypertension (DASH) dietary pattern; reduction of saturated fat and trans fat; increase of dietary n-3 fatty acids, viscous  fiber, and plant stanols/sterols intake; and increased physical activity recommended to improve the lipid profile and reduce the risk of developing atherosclerotic cardiovascular disease.     Retinopathy:  Yes.  Seeing retina specialist.    Nephropathy:  No current elevated BP.  Creatinine stable. Continue low dose Lisinopril 5 mg . Continue Jardiance 10 mg as long as tolerated.  Low BP limits further Ace I/ ARB.    Neuropathy: Yes - seeing Carlos VOGEL - seestuart [place] - on low dose Crestor, metoporlol, Lisinopril, ASA  Feet: OK, no ulcers or lesions.   Lipids:   Patient taking a statin. Crestor 5 mg.        3. Anemia: Is at anemia clinic weekly.  Goes tomorrow.      42 minutes in preparation for visit reviewing chart, labs and documentation, visiting with patient gathering history and in exam, education and counseling, as well as coordination of care, further chart review and documentation following visit on this date of service and as alluded to documented above.         It is my privilege to be involved in the care of the above patient.     Tish Toscano PA-C, MPAS  [facility]  Diabetes, Endocrinology, and Metabolism  [phone] Appointments/Nurse  [phone] pager  [phone] nurse line    This note was completed in part using Dragon voice recognition, and may contain word and grammatical errors.

## 2022-03-09 NOTE — TELEPHONE ENCOUNTER
Anemia Management Note  SUBJECTIVE/OBJECTIVE:  Referred by Dr. Eloy Rao on 2021  Primary Diagnosis: Anemia in Chronic Kidney Disease (N18.3, D63.1)   3b  Secondary Diagnosis:  Chronic Kidney Disease, Stage 3 (N18.3)  3b  Liver Tx: 2019  Hgb goal range:  9-10  Epo/Darbo: Aranesp 100mcg every 7 days for Hgb <10.  In Clinic.  *22: dose changed to weekly per Dr. Rao  *dose increased to 100mcg starting 22  *dose increased to 60mcg starting with  21 dose  Iron regimen:  NA.  Iron levels stable  Labs : 2022  Recent IVELISSE use, transfusion, IV iron: Aranesp   RX/TX plans :  6/10/2022     No history of stroke, MI and blood clots.  History of hepatocellular carcinoma - s/p TACE 2019 and liver transplant 2019.     Contact:            Ok to leave message regarding scheduling, medical, and billing per consent to communicate dated 10/2/19                              OK to speak with Davi Saunders (friend/POA) regarding scheduling, medical, and billing per consent to communicate dated 10/2/19    Anemia Latest Ref Rng & Units 2/3/2022 2022 2022 2022 2022 3/2/2022 3/9/2022   IVELISSE Dose - 100 mcg - 100 mcg 100 mcg 100 mcg 100 mcg 100 mcg   Hemoglobin 13.3 - 17.7 g/dL 6.3(LL) - 7.9(L) 7.9(L) 9.0(L) 8.6(L) 9.5(L)   TSAT 15 - 46 % - - 39 - - - -   Ferritin 26 - 388 ng/mL - - 1,046(H) - - - -   PRBCs - - 1 unit  - - - - -     BP Readings from Last 3 Encounters:   22 98/58   22 104/62   22 (!) 128/92     Wt Readings from Last 2 Encounters:   22 154 lb (69.9 kg)   21 176 lb 9.6 oz (80.1 kg)           ASSESSMENT:  Hgb:at goal - received dose in clinic - recommend continue current regimen  TSat: at goal >30% Ferritin: Elevated (>1000ng/mL)    PLAN:  Dose with aranesp and RTC for hgb then aranesp if needed in 1 week(s)    Orders needed to be renewed (for next follow-up date) in EPIC: None    Iron labs due:  May 2022    Plan discussed with:   No call, chart review       NEXT FOLLOW-UP DATE:  3/16/22 9am appt    Jie Blum RN   [facility]  [address] [address]   [email or web address]   Office : [phone]  Fax: [phone]

## 2022-03-09 NOTE — PROGRESS NOTES
Chief Complaint   Patient presents with     Allied Health Visit     Aranesp     Blood pressure 98/58, pulse 80.      Frequency: Every 7 days  Most recent or today's HGB:9.5  Date: 22  Date of lat dose: 22  HGB associated with last dose given: 8.6    Blood Pressure:98/58    Diagnosis: Anemia    Ordered by: Eloy Rao MD  VIS Offered: Yes    Double Checked by: Rhonda SORIA CMA    See MAR for administration details    Pt's first name, last name and  verified prior to medication administration, injection given without complications or questions.     Nereida Steiner CMA

## 2022-03-15 ENCOUNTER — LAB (OUTPATIENT)
Dept: LAB | Facility: CLINIC | Age: 57
End: 2022-03-15
Attending: INTERNAL MEDICINE
Payer: MEDICARE

## 2022-03-15 ENCOUNTER — ANCILLARY PROCEDURE (OUTPATIENT)
Dept: CARDIOLOGY | Facility: CLINIC | Age: 57
End: 2022-03-15
Attending: INTERNAL MEDICINE
Payer: MEDICARE

## 2022-03-15 ENCOUNTER — OFFICE VISIT (OUTPATIENT)
Dept: ENDOCRINOLOGY | Facility: CLINIC | Age: 57
End: 2022-03-15
Payer: MEDICARE

## 2022-03-15 VITALS
WEIGHT: 160 LBS | HEIGHT: 70 IN | SYSTOLIC BLOOD PRESSURE: 101 MMHG | DIASTOLIC BLOOD PRESSURE: 63 MMHG | BODY MASS INDEX: 22.9 KG/M2 | HEART RATE: 79 BPM

## 2022-03-15 VITALS
WEIGHT: 159.3 LBS | SYSTOLIC BLOOD PRESSURE: 100 MMHG | HEIGHT: 68 IN | DIASTOLIC BLOOD PRESSURE: 62 MMHG | HEART RATE: 72 BPM | BODY MASS INDEX: 24.14 KG/M2 | OXYGEN SATURATION: 100 %

## 2022-03-15 DIAGNOSIS — I25.10 CORONARY ARTERY DISEASE INVOLVING NATIVE CORONARY ARTERY OF NATIVE HEART WITHOUT ANGINA PECTORIS: ICD-10-CM

## 2022-03-15 DIAGNOSIS — I25.10 CAD (CORONARY ARTERY DISEASE): ICD-10-CM

## 2022-03-15 DIAGNOSIS — I25.10 CAD (CORONARY ARTERY DISEASE): Primary | ICD-10-CM

## 2022-03-15 DIAGNOSIS — Z95.1 S/P CABG (CORONARY ARTERY BYPASS GRAFT): ICD-10-CM

## 2022-03-15 DIAGNOSIS — I25.10 CORONARY ARTERY DISEASE INVOLVING NATIVE CORONARY ARTERY OF NATIVE HEART WITHOUT ANGINA PECTORIS: Primary | ICD-10-CM

## 2022-03-15 LAB
ANION GAP SERPL CALCULATED.3IONS-SCNC: 6 MMOL/L (ref 3–14)
BUN SERPL-MCNC: 46 MG/DL (ref 7–30)
CALCIUM SERPL-MCNC: 9.9 MG/DL (ref 8.5–10.1)
CHLORIDE BLD-SCNC: 110 MMOL/L (ref 94–109)
CO2 SERPL-SCNC: 24 MMOL/L (ref 20–32)
CREAT SERPL-MCNC: 1.72 MG/DL (ref 0.66–1.25)
GFR SERPL CREATININE-BSD FRML MDRD: 46 ML/MIN/1.73M2
GLUCOSE BLD-MCNC: 139 MG/DL (ref 70–99)
LVEF ECHO: NORMAL
POTASSIUM BLD-SCNC: 5.3 MMOL/L (ref 3.4–5.3)
SODIUM SERPL-SCNC: 140 MMOL/L (ref 133–144)

## 2022-03-15 PROCEDURE — 36415 COLL VENOUS BLD VENIPUNCTURE: CPT | Performed by: PATHOLOGY

## 2022-03-15 PROCEDURE — G0463 HOSPITAL OUTPT CLINIC VISIT: HCPCS

## 2022-03-15 PROCEDURE — 99214 OFFICE O/P EST MOD 30 MIN: CPT | Mod: 25 | Performed by: INTERNAL MEDICINE

## 2022-03-15 PROCEDURE — 80048 BASIC METABOLIC PNL TOTAL CA: CPT | Performed by: PATHOLOGY

## 2022-03-15 PROCEDURE — 93306 TTE W/DOPPLER COMPLETE: CPT | Performed by: STUDENT IN AN ORGANIZED HEALTH CARE EDUCATION/TRAINING PROGRAM

## 2022-03-15 PROCEDURE — 99207 PR STATISTIC IV PUSH SINGLE INITIAL SUBSTANCE: CPT | Performed by: INTERNAL MEDICINE

## 2022-03-15 PROCEDURE — 99215 OFFICE O/P EST HI 40 MIN: CPT | Performed by: PHYSICIAN ASSISTANT

## 2022-03-15 RX ADMIN — Medication 6 ML: at 09:57

## 2022-03-15 ASSESSMENT — PAIN SCALES - GENERAL
PAINLEVEL: NO PAIN (0)
PAINLEVEL: MILD PAIN (2)

## 2022-03-15 NOTE — NURSING NOTE
Chief Complaint   Patient presents with     Follow Up     RTN- reason for visit: 56 yo male with CAD, benign essential hypertenstion, dyslipidemia NSTEMI presents for annual follow up with labs and echo prior.     Vitals were taken and medications reconciled.    Juanpablo Gilliam, EMT  12:15 PM

## 2022-03-15 NOTE — PATIENT INSTRUCTIONS
Dear Miller,  Please   Continue Tresiba to 26 units daily.  Continue Novolog to 1u :20 g carb  Continue adding 1 extra unit Novolog if premeal BG >180.    Continue Empagliflozin 10 mg daily.    I hope taht you are able to get out and walk more soon.      If having low blood sugars again, please reduce Tresiba 1 more unit to 25 daily and call or My Chart me.    My best wishes,    Tish Toscano PA-C, MPAS  [facility]  Diabetes, Endocrinology, and Metabolism  [phone] Appointments/Nurse  [phone] Fax  [phone] URGENTafter hours/weekend Endocrinologist on call        We appreciate your assistance in coordinating your healthcare.     Please upload your insulin pump, blood sugar meter and/or continuous glucose monitor at home 1-2 days before your next diabetes-related appointment.   This will allow your provider to review your  data before your scheduled virtual visit.    To ask a question to your Endocrine care team, please send them a HomeTouch message, or reach them by phone at [phone]     To expedite your medication refill(s), please contact your pharmacy and have them   fax a refill request to: [phone].  *Please allow 3 business days for routine medication refills.  *Please allow 5 business days for controlled substance medication refills.    For after-hours urgent Endocrine issues, that do not require 821, please dial [phone], and ask to speak with the Endocrinologist On-Call

## 2022-03-15 NOTE — PROGRESS NOTES
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    He reports that since May started to have palpitations. They would occur every night. They would wake him from sleep. Occasionally occurring during the day. No lightheadedness, dizziness, or syncope. Frequency has decreased, but still occurs once in awhile at night. Last time was in the middle of the night when an ambulance driving by. No SOB or ROONEY, he lives on 4th floor and can walk up all 4 flights w/o SOB or CP.    Clinic visit 7/27/2021:  Since our last visit, patient was admitted to the hospital from 7/13/21 to 7/22/21. Patient presented with NSTEMI. He was found to have severe distal LM disease at the trifurcation of LCx, LAD, and ramus. On 7/14/21, patient underwent CABG (LIMA to LAD and SVG to OM2). His hospital stay was uncomplicated and was discharged home on 7/22/21.    Patient states, he is recovering well from his surgery. He does have pain at the chest incision site with movement. No clicking or abnormal sound at the sternotomy site. He is able to ambulate without difficulty. He has no PND, orthopnea, or LE edema.     03/15/22:  He has been somewhat tired from anemia which has limited his activity but he plans to try to increase this soon, resuming walking and light weights. No chest pain or dyspnea. Very rare very minimal pain at sternotomy site. He completed cardiac rehab.     TTE 3/15/22 (images reviewed by me): Normal LV size/function, LVEF>65%. Probably normal RV size/function, RVFAC 40%. No significant valvular abnormalities. No significant change from 1/11/22.    PAST MEDICAL HISTORY:  Past Medical History:   Diagnosis Date     Anemia 2013     Arthritis      BPH (benign prostatic hyperplasia)      CAD (coronary artery  disease) 4/1/2019     Cholelithiasis      Conductive hearing loss 08/16/2017     Depressive disorder 1986    Suffer effects throughout life     Gastroesophageal reflux disease 12/01/2014     HCC (hepatocellular carcinoma) (H) 1/22/2019     History of diabetic retinopathy 07/2018     HTN (hypertension)      HTN (hypertension) 11/20/2019     Hyperlipidemia      Liver cirrhosis secondary to ESTRADA (H)      Liver transplanted (H) 11/11/2019     Portal vein thrombosis 4/11/2019     Type II diabetes mellitus (H)        CURRENT MEDICATIONS:  Current Outpatient Medications   Medication Sig Dispense Refill     Acetaminophen (TYLENOL) 325 MG CAPS Take 325-650 mg by mouth every 4 hours as needed (pain, fever) 100 capsule 1     ARIPiprazole (ABILIFY) 5 MG tablet daily       aspirin (SM ASPIRIN ADULT LOW STRENGTH) 81 MG EC tablet Take 2 tablets (162 mg) by mouth daily 180 tablet 1     BD VIKTORIA U/F 32G X 4 MM insulin pen needle Use 5 per day 300 each 3     ciclopirox (LOPROX) 0.77 % cream Apply topically 2 times daily To feet and toenails. 90 g 5     Continuous Blood Gluc  (FREESTYLE CLAUDIA 14 DAY READER) CECILIO Use to read blood sugars as per 's instructions. 1 each 0     Continuous Blood Gluc Sensor (FREESTYLE CLAUDIA 2 SENSOR) [facility] 1 each See Admin Instructions Change every 14 days. 2 each 5     cyanocobalamin (VITAMIN B-12) 1000 MCG tablet TAKE 1 TABLET (1,000 MCG) BY MOUTH DAILY 30 tablet 11     empagliflozin (JARDIANCE) 10 MG TABS tablet Take 1 tablet (10 mg) by mouth daily 30 tablet 11     insulin aspart (NOVOLOG PEN) 100 UNIT/ML pen Inject 1-5 Units Subcutaneous At Bedtime MEDIUM INSULIN RESISTANCE DOSING  Do Not give Bedtime Correction Insulin if BG less than  200. For  - 249 give 1 units. For  - 299 give 2 units. For  - 349 give 3 units. For  -399 give 4 units. For BG greater than or equal to 400 give 5 units. Notify provider if glucose greater than or equal to 350 mg/dL after  administration of correction dose. If given at mealtime, administer within 30 minutes of start of meal. Approx daily dose 5 units. 15 mL 0     insulin degludec (TRESIBA FLEXTOUCH) 100 UNIT/ML pen Inject 26 units subcutaneous once daily 25 mL 3     lamiVUDine (EPIVIR) 100 MG tablet Take 1 tablet (100 mg) by mouth daily 90 tablet 3     lisinopril (ZESTRIL) 5 MG tablet Take 1 tablet (5 mg) by mouth daily 30 tablet 11     methocarbamol (ROBAXIN) 750 MG tablet Take 1 tablet (750 mg) by mouth 3 times daily as needed for muscle spasms (sternal pain) 60 tablet 0     metoprolol succinate ER (TOPROL-XL) 25 MG 24 hr tablet Take 0.5 tablets (12.5 mg) by mouth daily 60 tablet 3     Multiple Vitamin (TAB-A-GLADIS) TABS Take 1 tablet by mouth daily 90 tablet 3     mycophenolate (GENERIC EQUIVALENT) 250 MG capsule Take 2 capsules (500 mg) by mouth every 12 hours 120 capsule 11     order for DME 1 Device by Device route daily Knee high compression socks 15-20 mmhg. 1 Device 0     oxyCODONE (ROXICODONE) 5 MG tablet Take 1 tablet (5 mg) by mouth every 4 hours as needed for moderate to severe pain 20 tablet 0     pantoprazole (PROTONIX) 40 MG EC tablet Take 1 tablet (40 mg) by mouth daily before breakfast 90 tablet 3     polyethylene glycol (MIRALAX) 17 g packet Take 1 packet by mouth daily       predniSONE (DELTASONE) 5 MG tablet TAKE ONE TABLET BY MOUTH ONCE DAILY 90 tablet 3     rosuvastatin (CRESTOR) 5 MG tablet Take 1 tablet (5 mg) by mouth daily 90 tablet 3     senna-docusate (SENOKOT-S/PERICOLACE) 8.6-50 MG tablet Take 1 tablet by mouth 2 times daily as needed for constipation 50 tablet 0     tamsulosin (FLOMAX) 0.4 MG capsule Take 1 capsule (0.4 mg) by mouth daily 90 capsule 3     vitamin D3 (CHOLECALCIFEROL) 50 mcg (2000 units) tablet Take 1 tablet (50 mcg) by mouth daily 90 tablet 3       PAST SURGICAL HISTORY:  Past Surgical History:   Procedure Laterality Date     BYPASS GRAFT ARTERY CORONARY N/A 7/14/2021    Procedure:  median sternotomy, on cardiopulmonary bypass, CORONARY ARTERY BYPASS GRAFT (CABG) x2 with left greater saphenous vein endoscopic harvest and left internal mammery artery harvest;  Surgeon: Tom Zapata MD;  Location: [facility]     COLONOSCOPY      2015     COLONOSCOPY N/A 12/6/2019    Procedure: COLONOSCOPY, WITH POLYPECTOMY AND BIOPSY;  Surgeon: Adam Morton MD;  Location: [facility]     CV CENTRAL VENOUS CATHETER PLACEMENT N/A 7/12/2021    Procedure: Central Venous Catheter Placement;  Surgeon: Fermin Polanco MD;  [facility]     CV CORONARY ANGIOGRAM N/A 7/12/2021    Procedure: Coronary Angiogram;  Surgeon: Fermin Polanco MD;  [facility]     CV HEART CATHETERIZATION WITH POSSIBLE INTERVENTION N/A 2/26/2019    Procedure: CORS;  Surgeon: Jagdish Hoyt MD;  Location:  [facility]     CV INTRA AORTIC BALLOON N/A 7/12/2021    Procedure: Intra Aortic Balloon Pump Insertion;  Surgeon: Fermin Polanco MD;  Location:  [facility]     ESOPHAGOSCOPY, GASTROSCOPY, DUODENOSCOPY (EGD), COMBINED N/A 11/17/2016    Procedure: COMBINED ESOPHAGOSCOPY, GASTROSCOPY, DUODENOSCOPY (EGD);  Surgeon: Santi Rosas MD;  [facility]     ESOPHAGOSCOPY, GASTROSCOPY, DUODENOSCOPY (EGD), COMBINED N/A 11/17/2017    Procedure: COMBINED ESOPHAGOSCOPY, GASTROSCOPY, DUODENOSCOPY (EGD);  EGD;  Surgeon: Santi Rosas MD;  Location: [facility]     ESOPHAGOSCOPY, GASTROSCOPY, DUODENOSCOPY (EGD), COMBINED N/A 12/28/2018    Procedure: EGD;  Surgeon: Santi Rosas MD;  [facility]     ESOPHAGOSCOPY, GASTROSCOPY, DUODENOSCOPY (EGD), COMBINED N/A 12/6/2019    Procedure: ESOPHAGOGASTRODUODENOSCOPY, WITH BIOPSY;  Surgeon: Adam Morton MD;  [facility]     ESOPHAGOSCOPY, GASTROSCOPY, DUODENOSCOPY (EGD), COMBINED N/A 2/13/2020    Procedure: ESOPHAGOGASTRODUODENOSCOPY (EGD);  Surgeon:  Santi Rosas MD;  Location: [facility]     HEAD & NECK SURGERY      2017 at [facility].      IMPLANT GOLD WEIGHT EYELID Right 2017    Procedure: IMPLANT WEIGHT EYELID;  Right Upper Eyelid Weight, right tarsal strip lower eyelid;  Surgeon: Milana Malave MD;  [facility]     IR CHEMO EMBOLIZATION  2019     KNEE SURGERY Left      ORTHOPEDIC SURGERY       PAROTIDECTOMY, RADICAL NECK DISSECTION Right 2017    Procedure: PAROTIDECTOMY, RADICAL NECK DISSECTION;  Right Superfacial Parotidectomy , Facial nerve repair. with [facility] facial nerve monitor.;  Surgeon: Asiya Morgan MD;  Location: [facility]     PICC INSERTION Left 2017    4fr SL BioFlo PICC, 44cm in the L basilic vein w/ tip in the low SVC     RETURN LIVER TRANSPLANT N/A 2019    Procedure: Exploratory laparotomy, hematoma evacuation, abdominal washout;  Surgeon: Александр Ramos MD;  Location: [facility]     TRANSPLANT LIVER RECIPIENT  DONOR N/A 2019    Procedure: TRANSPLANT, LIVER, RECIPIENT,  DONOR;  Surgeon: Александр Ramos MD;  Location: [facility]     VASCULAR SURGERY         ALLERGIES:     Allergies   Allergen Reactions     Codeine Other (See Comments)     Cannot take due to liver  Cannot tolerate oral narcotics     Seasonal Allergies      Sneezing, coughing, runny and itchy eyes       FAMILY HISTORY:  Family History   Problem Relation Age of Onset     Prostate Cancer Maternal Grandfather      Substance Abuse Maternal Grandfather         Alcohol     Colon Cancer Father 60     Pancreatic Cancer Father 60     Prostate Cancer Father      Colorectal Cancer Father      Macular Degeneration Father      Cancer Father      Glaucoma Father      Skin Cancer Father      Colorectal Cancer Maternal Grandmother      Cancer Maternal Grandmother      Substance Abuse Maternal Grandmother         Alcohol     Colorectal Cancer Paternal Grandmother      Cancer Mother      Diabetes Mother          3/2016      Cerebrovascular Disease Mother         Passed away in Feb of this year, 80 years old.     Thyroid Disease Mother      Depression Mother      Asthma Sister         Had since birth     Thyroid Disease Sister      Depression Sister      Liver Disease No family hx of      Melanoma No family hx of      No family history of premature CAD or sudden death.    SOCIAL HISTORY:  Social History     Tobacco Use     Smoking status: Former Smoker     Packs/day: 6.00     Years: 30.00     Pack years: 180.00     Types: Cigars     Start date: 2016     Quit date: 10/25/2017     Years since quittin.3     Smokeless tobacco: Former User     Types: Chew     Quit date: 10/31/2017     Tobacco comment: 1 tin per week   Substance Use Topics     Alcohol use: No     Alcohol/week: 0.0 standard drinks     Comment: quit 1996     Drug use: No       ROS:   A comprehensive 14 point review of systems is negative other than as mentioned in HPI.    Exam:  There were no vitals taken for this visit.    GENERAL APPEARANCE: healthy, alert and no distress  EYES: no icterus, no xanthelasmas  ENT: normal palate, mucosa moist, no central cyanosis  NECK: JVP not elevated  RESPIRATORY: lungs clear to auscultation - no rales, rhonchi or wheezes, no use of accessory muscles, no retractions, respirations are unlabored, normal respiratory rate  CARDIOVASCULAR: regular rhythm, normal S1 with physiologic split S2, no S3 or S4 and no murmur, click or rub.  GI: soft, non tender, bowel sounds normal,no abdominal bruits  EXTREMITIES: trace pitting edema in BLE  NEURO: alert and oriented to person/place/time, normal speech, gait and affect  VASC: Radial, dorsalis pedis and posterior tibialis pulses 2+ bilaterally.  SKIN: well-healed sternotomy site, incision clean/dry/intact.  PSYCH: cooperative, affect appropriate.     Labs:  Reviewed.       Testing/Procedures:    19 Coronary angiogram:  1. Severe liver disease undergoing transplant evaluation.  2.  Moderate coronary artery disease of the ostial left main (40-50% stenosis) which does not appear to be flow-limiting based on minimal luminal area of 7.2mm2 by IVUS.  3. Radial artery sheath removed and hemostasis achieved with a TR band.    Coronary angiogram 7/12/2021  Severe distal Left main disease at trifurcation of LCx, LAD, and Ramus.     Operative report 7/27/2021:  Coronary artery bypass grafting x 2.  - Left internal mammary artery to left anterior descending artery  - Reversed saphenous vein graft to obtuse marginal artery 2    Zio patch (5/26-6/2): patient triggered events were (sinus tachy 102, 99 BPM); rare isolated PVCs and couplets    I personally visualized and interpreted:  TTE 3/15/22 (images reviewed by me): Normal LV size/function, LVEF>65%. Probably normal RV size/function, RVFAC 40%. No significant valvular abnormalities. No significant change from 1/11/22.          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    1. CAD s/p CABG (LIMA to LAD and SVG to OM2) on 7/14/21 without angina  2. NSTEMI 7/2021  Patient is recovering well postoperatively.   - Continue Aspirin 81 mg daily and Rosuvastatin 5 mg daily.  - Continue Coreg 6.25 mg BID  - completed cardiac rehab    3. HTN, controlled: Continue carvedilol.    Follow up in 1 year.    Manjeet Ireland MD  Cardiology

## 2022-03-15 NOTE — NURSING NOTE
"Chief Complaint   Patient presents with     Diabetes     Vital signs:      BP: 101/63 Pulse: 79           Height: 177.8 cm (5' 10\") Weight: 72.6 kg (160 lb)  Estimated body mass index is 22.96 kg/m  as calculated from the following:    Height as of this encounter: 1.778 m (5' 10\").    Weight as of this encounter: 72.6 kg (160 lb).          "

## 2022-03-15 NOTE — LETTER
3/15/2022       RE: Frandy Workman  [address] [address]     Dear Colleague,    Thank you for referring your patient, Frandy Workman, to the [facility] ENDOCRINOLOG[facility] [place] at [facility]. Please see a copy of my visit note below.      Diabetes Virtual Consult Note  June 29, 2021       HPI:  Frandy Workman is a 57 year old male with a past medical history including HTN, HLD, CAD s/p 2 vessel CABG 7/2021, liver cirrhosis 2/2 ESTRADA c/b HCC and PVT s/p liver transplant 11/2019, CKD stage III, chronic anemia on aranesp, IDDM2, BPH, depressive disorder, bipolar disorder who is here for f/u of his insulin dependent type 2 diabetes.      His type 2 diabetes is complicated by CVD, CKD3, chronic anemia, B pedal neuropathy, erectile dysfunction, recurrent deopression and NPDMR, now with B macular edema.      His more recent diabetes care includes:     January 2021: advised to increase his Tresiba to 28 units daily with a goal of fasting blood sugar 90-1 40 as advised to increase his Jardiance from 10 mg to 20 mg daily at the time of renewal of this prescription.  He also continued to complain of sweating night sweats and was awaiting a CT scheduled for March 26 as well as seeing psychiatry in regards to this.  We also attempted to adjust his camilo prescription as he was running out at the end of every month.  His average blood glucose at that time was 180.    March 2021: Blood glucose was slightly above goal and he was struggling to keep NovoLog and camilo consistently available. We communicated with pharmacy liaison in hopes of adjusting this. His self-management goal was to more consistently give carbohydrate coverage 1 unit per 15 g ahead of meals. He also planned to continue giving correction of NovoLog 1 unit per 50 greater than 150 prior to meals empagliflozin 10 mg daily, Tresiba 28 units /day and work on getting some physical  activity in really each day as well as adding in some resistance training.    July 2021:  Night sweats resolved.  Advised and agreed to work on giving 1u:15 g Humalog 10 - 15 minutes before each meal.     Nov 2021: Increased his exercise and estimated A1C 6.7, but having some limited hypoglycemia, drinking juice every night and with exercise to counteract.   Thus advised to reduce Tresiba to 26 units daily, Reduce Novolog to 1u :20 g carb, continue adding 1 extra unit Novolog if premeal BG >180 and continue Empagliflozin 10 mg daily.      Interim:  Admitted on 1/10/2022 for acute on chronic anemia, JERONIMO, and chest pain. On admission EKG without signs of ischemia and troponin negative. A TTE was done which showed no new WMA and preserved function as well as signs of hypovolemia. On exam it was noted that chest pain was reproducible upon palpation supporting MSK in nature. He was started on IVF with improvement in his kidney function and also given a unit of blood. Primary team  suspected his presentation was multifactorial from anemia, jeronimo and hypovolemia in the setting of decreased PO intake.    Saw cody Jan 2022, has B macular edema. Dr. Gonzalez Feb 2022, pongoing neuropathy, protective sensation remains intact.      GFR Estimate   Date Value Ref Range Status   02/03/2022 52 (L) >60 mL/min/1.73m2 Final       Hemoglobin   Date Value Ref Range Status   03/09/2022 9.5 (L) 13.3 - 17.7 g/dL Final   06/28/2021 7.3 (L) 13.3 - 17.7 g/dL Final   ]    Today:    Miller reports feels diabetes management going okay.  No concerns.  Trying to stay on top on disease.  He has had some lows.  Lowest was 70.  He did feel warm with this and knew he was low.  Takes orange juice and checks in 15 minutes and always comes up. He has been trying walk more lately but very tired.  Walking 1/2 mile every other day.  Trying to eat enough.  Has Eggos waffles for breakfast, has smart balance pasta a lot for dinner.  Has some fruits and  vegetables.  Does take an MVI.  Drinks 1% milk about 12 oz daily. He does eat a good deal of black beans, varela beans.      He does give insulin dose 5-10 minutes before he eats. Getting supplies and meds regularly but without a meter 3 days every month.  Needs batteries for his glucometer.    Denies lightheaded when stands, but does get tired.        Dm Regimen:   - Tresiba 26 units /day.   -  carbohydrate coverage to 1 unit : 20 g of carbohydrate.   -  Correction Novolounit Novolo mg /dl >180     - Empagliflozin 10 mg daily.      We reviewed glucometer, pump and CGMS data together.  It revealed:      Limited data, average BG increased from 140, but hypoglycemia eliminated, rather stable post prandial excursions.          History of Diabetes monitoring and complications/ prevention:  CAD: yes 2019  Last eye exam results:Treated for NPDMR - last seen 3/3/21  Microalbuminuria: yes - has CKD, not currently on Ace or ARB, sees Cardiology , Nephrology.  Is on Jardiance, understands to hold if possible dehydration.    HTN: past - on coreg  On statin: yes  On ASA:yes  Depression:yes with anxiety and BPAD - sees psych  Neuropathy: yes, wears diabetic shoes. Protective sensation intact.  Sees Dr Gonzalez.   PVD: not clear.   ED:yes    Frandy's PMH reviewed with him.      Current Outpatient Medications   Medication     Acetaminophen (TYLENOL) 325 MG CAPS     ARIPiprazole (ABILIFY) 5 MG tablet     aspirin (SM ASPIRIN ADULT LOW STRENGTH) 81 MG EC tablet     BD VIKTORIA U/F 32G X 4 MM insulin pen needle     ciclopirox (LOPROX) 0.77 % cream     Continuous Blood Gluc  (FREESTYLE CLAUDIA 14 DAY READER) CECILIO     Continuous Blood Gluc Sensor (FREESTYLE CLAUDIA 2 SENSOR) MISC     cyanocobalamin (VITAMIN B-12) 1000 MCG tablet     empagliflozin (JARDIANCE) 10 MG TABS tablet     insulin aspart (NOVOLOG PEN) 100 UNIT/ML pen     insulin degludec (TRESIBA FLEXTOUCH) 100 UNIT/ML pen     lamiVUDine (EPIVIR) 100 MG tablet      lisinopril (ZESTRIL) 5 MG tablet     methocarbamol (ROBAXIN) 750 MG tablet     metoprolol succinate ER (TOPROL-XL) 25 MG 24 hr tablet     Multiple Vitamin (TAB-A-GLADIS) TABS     mycophenolate (GENERIC EQUIVALENT) 250 MG capsule     order for DME     oxyCODONE (ROXICODONE) 5 MG tablet     pantoprazole (PROTONIX) 40 MG EC tablet     polyethylene glycol (MIRALAX) 17 g packet     predniSONE (DELTASONE) 5 MG tablet     rosuvastatin (CRESTOR) 5 MG tablet     senna-docusate (SENOKOT-S/PERICOLACE) 8.6-50 MG tablet     tamsulosin (FLOMAX) 0.4 MG capsule     vitamin D3 (CHOLECALCIFEROL) 50 mcg (2000 units) tablet     Current Facility-Administered Medications   Medication     aflibercept (EYLEA) injection prefilled syringe 2 mg     aflibercept (EYLEA) injection prefilled syringe 2 mg              Frandy's family history includes Asthma in his sister; Cancer in his father, maternal grandmother, and mother; Cerebrovascular Disease in his mother; Colon Cancer (age of onset: 60) in his father; Colorectal Cancer in his father, maternal grandmother, and paternal grandmother; Depression in his mother and sister; Diabetes in his mother; Glaucoma in his father; Macular Degeneration in his father; Pancreatic Cancer (age of onset: 60) in his father; Prostate Cancer in his father and maternal grandfather; Skin Cancer in his father; Substance Abuse in his maternal grandfather and maternal grandmother; Thyroid Disease in his mother and sister.    ROS:   Patient denies any fevers, chills or sweats as well as any changes in or problems with vision, pain or problems with dentition, new or different headaches.  Patient denies symptoms of hypo and hyperglycemia except as above.   Patients denies marked fatigue, cough, shortness of breath, chest pain or pressure.  There has been no pain with or other changes in urination or  itching or pain in genital areas.  Patient denies any noted swelling in feet, ankles or otherwise, loss of sensation or pain  " in feet or other areas.    Patient also denies current difficulties with depressed mood, anhedonia or worrying too much.        Exam:      /63 (BP Location: Left arm, Patient Position: Sitting, Cuff Size: Adult Regular)   Pulse 79   Ht 1.778 m (5' 10\")   Wt 72.6 kg (160 lb)   BMI 22.96 kg/m      Wt Readings from Last 10 Encounters:   03/15/22 72.6 kg (160 lb)   01/13/22 69.9 kg (154 lb)   12/07/21 80.1 kg (176 lb 9.6 oz)   10/04/21 77.6 kg (171 lb)   07/30/21 70.6 kg (155 lb 9.6 oz)   07/27/21 73.5 kg (162 lb)   07/22/21 73.5 kg (162 lb)   05/05/21 78.8 kg (173 lb 11.2 oz)   11/11/20 78.5 kg (173 lb)   10/28/20 78.2 kg (172 lb 6.4 oz)         Frandy is alerted and oriented.  Neatly groomed and articulate.    Mood is \"good,\" affect is congruent.  Thoughtful form and content are fluid and coherent.  No signs of distress are appreciated.    GENERAL: Healthy, alert and no distress.  Clean shaven.    EYES: Eyes grossly normal to inspection.  No discharge or erythema, or obvious scleral/conjunctival abnormalities.  RESP: No audible wheeze, cough, or visible cyanosis.  No visible retractions or increased work of breathing.    SKIN: Visible skin clear. No significant rash, abnormal pigmentation or lesions.  NEURO: Cranial nerves grossly intact.  Mentation and speech appropriate for age.  PSYCH: Mentation appears normal, affect normal/bright, judgement and insight intact, normal speech and appearance well-groomed.      Data:      Most recent:  Recent Labs   Lab Test 02/03/22  0904 01/13/22  0724 01/11/22  2200 01/10/22  2142 11/26/21  0747 10/04/21  0804 10/04/21  0802 09/24/21  0912 09/07/21  1025 07/14/21  0337 07/13/21  0404 03/26/21  0826 02/26/21  0750 02/26/21  0743 01/28/21  1209 10/14/20  0836 09/25/20  0859 03/09/20  0823 03/04/20  0000 12/02/19  1034 12/02/19  0000 08/08/18  1254 08/08/18  1246   A1C  --   --   --  6.0*  --   --   --   --   --   --  6.8*  --   --   --   --    < >  --    < >  --   --   -- "   --  6.6*   HEMOGLOBINA1  --   --   --   --   --   --   --   --   --   --   --   --   --   --   --   --   --   --  4.8  --  5.1   < >  --    TSH  --   --   --   --   --   --  1.90  --   --   --   --   --   --   --  0.91  --   --   --   --    < >  --   --  0.49   T4  --   --   --   --   --   --   --   --   --   --   --   --   --   --   --   --   --   --   --   --   --   --  1.21   LDL  --   --   --   --   --   --   --   --  95  --   --   --   --   --   --   --  61   < >  --   --   --    < > 68   HDL  --   --   --   --   --   --   --   --  34*  --   --   --   --   --   --   --  39*   < >  --   --   --    < > 30*   TRIG  --   --   --   --   --   --   --   --  233*  --   --   --   --   --   --   --  228*   < >  --   --   --    < > 172*   CR 1.54* 1.35*   < > 2.21*   < >  --  1.26*   < >  --    < > 1.45*   < >  --    < >  --    < > 1.48*   < >  --    < >  --    < >  --    MICROL  --   --   --   --   --  1,020  --   --   --   --   --   --  563  --   --   --   --    < >  --    < >  --    < >  --     < > = values in this interval not displayed.       GFR Estimate   Date Value Ref Range Status   02/03/2022 52 (L) >60 mL/min/1.73m2 Final     Lab Results   Component Value Date    MICROL 196 06/29/2020       No results found for: CPEPT, GADAB, ISCAB      Assessment/Plan:    Frandy solomon  is a 57 year old male with a past medical history including HTN, HLD, CAD s/p 2 vessel CABG 7/2021, liver cirrhosis 2/2 ESTRADA c/b HCC and PVT s/p liver transplant 11/2019, CKD stage III, chronic anemia on aranesp, IDDM2, BPH, depressive disorder, bipolar disorder who is here for f/u of his insulin dependent type 2 diabetes.      His type 2 diabetes is complicated by CVD, CKD3, chronic anemia, B pedal neuropathy, erectile dysfunction, recurrent deopression and NPDMR, now with B macular edema.      1. DM2, blood sugar     Recommend:  Continue Tresiba to 26 units daily.  Continue Novolog to 1u :20 g carb  Continue adding 1 extra unit Novolog if  premeal BG >180.    Continue Empagliflozin 10 mg daily.      RTC 6 months      2. DM Complications-     Lifestyle modification focusing on application of a Mediterranean diet or Dietary Approaches to Stop Hypertension (DASH) dietary pattern; reduction of saturated fat and trans fat; increase of dietary n-3 fatty acids, viscous fiber, and plant stanols/sterols intake; and increased physical activity recommended to improve the lipid profile and reduce the risk of developing atherosclerotic cardiovascular disease.     Retinopathy:  Yes.  Seeing retina specialist.    Nephropathy:  No current elevated BP.  Creatinine stable. Continue low dose Lisinopril 5 mg . Continue Jardiance 10 mg as long as tolerated.  Low BP limits further Ace I/ ARB.    Neuropathy: Yes - seeing Carlos VOGEL - seestuart Ireland - on low dose Crestor, metoporlol, Lisinopril, ASA  Feet: OK, no ulcers or lesions.   Lipids:   Patient taking a statin. Crestor 5 mg.        3. Anemia: Is at anemia clinic weekly.  Goes tomorrow.      42 minutes in preparation for visit reviewing chart, labs and documentation, visiting with patient gathering history and in exam, education and counseling, as well as coordination of care, further chart review and documentation following visit on this date of service and as alluded to documented above.         It is my privilege to be involved in the care of the above patient.     Tish Toscano PA-C, MPAS  [facility]  Diabetes, Endocrinology, and Metabolism  [phone] Appointments/Nurse  [phone] pager  [phone] nurse line    This note was completed in part using Dragon voice recognition, and may contain word and grammatical errors.

## 2022-03-15 NOTE — PATIENT INSTRUCTIONS
"Cardiology Providers you saw during your visit:  Dr. Ireland    Medication changes:  None.    Follow up:  Follow up with Dr. Ireland in 1 year.    Labs:      Please call if you have :  1. Weight gain of more than 2 pounds in a day or 5 pounds in a week  2. Increased shortness of breath, swelling or bloating  3. Dizziness, lightheadedness   4. Any questions or concerns.       Follow the American Heart Association Diet and Lifestyle recommendations:  Limit saturated fat, trans fat, sodium, red meat, sweets and sugar-sweetened beverages. If you choose to eat red meat, compare labels and select the leanest cuts available.  Aim for at least 150 minutes of moderate physical activity or 75 minutes of vigorous physical activity - or an equal combination of both - each week.      During business hours: [phone], press option # 1 to be routed to the [facility] then option # 4 for \"To send a message to your care team\"      After hours, weekends or holidays: On Call Cardiologist- [phone]   option #4 and ask to speak to the on-call Cardiologist. Inform them you are a CORE/heart failure patient at the [facility].        Vivian Carver, RN BSN   Cardiology Nurse Coordinator - Heart Failure/[facility]  Questions and [phone]   First press #1 for the [facility] and then press #4 for \"To send a message to your care team\"    "

## 2022-03-15 NOTE — LETTER
3/15/2022      RE: Frandy Workman  [address] [facility] [address]       Dear Colleague,    Thank you for the opportunity to participate in the care of your patient, Frandy Workman, at the [facility] [place] [facility]. Please see a copy of my visit note below.    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    He reports that since May started to have palpitations. They would occur every night. They would wake him from sleep. Occasionally occurring during the day. No lightheadedness, dizziness, or syncope. Frequency has decreased, but still occurs once in awhile at night. Last time was in the middle of the night when an ambulance driving by. No SOB or ROONEY, he lives on 4th floor and can walk up all 4 flights w/o SOB or CP.    Clinic visit 7/27/2021:  Since our last visit, patient was admitted to the hospital from 7/13/21 to 7/22/21. Patient presented with NSTEMI. He was found to have severe distal LM disease at the trifurcation of LCx, LAD, and ramus. On 7/14/21, patient underwent CABG (LIMA to LAD and SVG to OM2). His hospital stay was uncomplicated and was discharged home on 7/22/21.    Patient states, he is recovering well from his surgery. He does have pain at the chest incision site with movement. No clicking or abnormal sound at the sternotomy site. He is able to ambulate without difficulty. He has no PND, orthopnea, or LE edema.     03/15/22:  He has been somewhat tired from anemia which has limited his activity but he plans to try to increase this soon, resuming walking and light weights. No chest pain or dyspnea. Very rare very minimal pain at sternotomy site. He completed cardiac rehab.     TTE  3/15/22 (images reviewed by me): Normal LV size/function, LVEF>65%. Probably normal RV size/function, RVFAC 40%. No significant valvular abnormalities. No significant change from 1/11/22.    PAST MEDICAL HISTORY:  Past Medical History:   Diagnosis Date     Anemia 2013     Arthritis      BPH (benign prostatic hyperplasia)      CAD (coronary artery disease) 4/1/2019     Cholelithiasis      Conductive hearing loss 08/16/2017     Depressive disorder 1986    Suffer effects throughout life     Gastroesophageal reflux disease 12/01/2014     HCC (hepatocellular carcinoma) (H) 1/22/2019     History of diabetic retinopathy 07/2018     HTN (hypertension)      HTN (hypertension) 11/20/2019     Hyperlipidemia      Liver cirrhosis secondary to ESTRADA (H)      Liver transplanted (H) 11/11/2019     Portal vein thrombosis 4/11/2019     Type II diabetes mellitus (H)        CURRENT MEDICATIONS:  Current Outpatient Medications   Medication Sig Dispense Refill     Acetaminophen (TYLENOL) 325 MG CAPS Take 325-650 mg by mouth every 4 hours as needed (pain, fever) 100 capsule 1     ARIPiprazole (ABILIFY) 5 MG tablet daily       aspirin (SM ASPIRIN ADULT LOW STRENGTH) 81 MG EC tablet Take 2 tablets (162 mg) by mouth daily 180 tablet 1     BD VIKTORIA U/F 32G X 4 MM insulin pen needle Use 5 per day 300 each 3     ciclopirox (LOPROX) 0.77 % cream Apply topically 2 times daily To feet and toenails. 90 g 5     Continuous Blood Gluc  (FREESTYLE CLAUDIA 14 DAY READER) CECILIO Use to read blood sugars as per 's instructions. 1 each 0     Continuous Blood Gluc Sensor (FREESTYLE CLAUDIA 2 SENSOR) MISC 1 each See Admin Instructions Change every 14 days. 2 each 5     cyanocobalamin (VITAMIN B-12) 1000 MCG tablet TAKE 1 TABLET (1,000 MCG) BY MOUTH DAILY 30 tablet 11     empagliflozin (JARDIANCE) 10 MG TABS tablet Take 1 tablet (10 mg) by mouth daily 30 tablet 11     insulin aspart (NOVOLOG PEN) 100 UNIT/ML pen Inject 1-5 Units Subcutaneous At  Bedtime MEDIUM INSULIN RESISTANCE DOSING  Do Not give Bedtime Correction Insulin if BG less than  200. For  - 249 give 1 units. For  - 299 give 2 units. For  - 349 give 3 units. For  -399 give 4 units. For BG greater than or equal to 400 give 5 units. Notify provider if glucose greater than or equal to 350 mg/dL after administration of correction dose. If given at mealtime, administer within 30 minutes of start of meal. Approx daily dose 5 units. 15 mL 0     insulin degludec (TRESIBA FLEXTOUCH) 100 UNIT/ML pen Inject 26 units subcutaneous once daily 25 mL 3     lamiVUDine (EPIVIR) 100 MG tablet Take 1 tablet (100 mg) by mouth daily 90 tablet 3     lisinopril (ZESTRIL) 5 MG tablet Take 1 tablet (5 mg) by mouth daily 30 tablet 11     methocarbamol (ROBAXIN) 750 MG tablet Take 1 tablet (750 mg) by mouth 3 times daily as needed for muscle spasms (sternal pain) 60 tablet 0     metoprolol succinate ER (TOPROL-XL) 25 MG 24 hr tablet Take 0.5 tablets (12.5 mg) by mouth daily 60 tablet 3     Multiple Vitamin (TAB-A-GLADIS) TABS Take 1 tablet by mouth daily 90 tablet 3     mycophenolate (GENERIC EQUIVALENT) 250 MG capsule Take 2 capsules (500 mg) by mouth every 12 hours 120 capsule 11     order for DME 1 Device by Device route daily Knee high compression socks 15-20 mmhg. 1 Device 0     oxyCODONE (ROXICODONE) 5 MG tablet Take 1 tablet (5 mg) by mouth every 4 hours as needed for moderate to severe pain 20 tablet 0     pantoprazole (PROTONIX) 40 MG EC tablet Take 1 tablet (40 mg) by mouth daily before breakfast 90 tablet 3     polyethylene glycol (MIRALAX) 17 g packet Take 1 packet by mouth daily       predniSONE (DELTASONE) 5 MG tablet TAKE ONE TABLET BY MOUTH ONCE DAILY 90 tablet 3     rosuvastatin (CRESTOR) 5 MG tablet Take 1 tablet (5 mg) by mouth daily 90 tablet 3     senna-docusate (SENOKOT-S/PERICOLACE) 8.6-50 MG tablet Take 1 tablet by mouth 2 times daily as needed for constipation 50 tablet 0      tamsulosin (FLOMAX) 0.4 MG capsule Take 1 capsule (0.4 mg) by mouth daily 90 capsule 3     vitamin D3 (CHOLECALCIFEROL) 50 mcg (2000 units) tablet Take 1 tablet (50 mcg) by mouth daily 90 tablet 3       PAST SURGICAL HISTORY:  Past Surgical History:   Procedure Laterality Date     BYPASS GRAFT ARTERY CORONARY N/A 7/14/2021    Procedure: median sternotomy, on cardiopulmonary bypass, CORONARY ARTERY BYPASS GRAFT (CABG) x2 with left greater saphenous vein endoscopic harvest and left internal mammery artery harvest;  Surgeon: Tom Zapata MD;  Location: [facility] OR     COLONOSCOPY      2015     COLONOSCOPY N/A 12/6/2019    Procedure: COLONOSCOPY, WITH POLYPECTOMY AND BIOPSY;  Surgeon: Adam Morton MD;  Location: [facility] GI     CV CENTRAL VENOUS CATHETER PLACEMENT N/A 7/12/2021    Procedure: Central Venous Catheter Placement;  Surgeon: Fermin Polanco MD;  Location:  HEART CARDIAC CATH LAB     CV CORONARY ANGIOGRAM N/A 7/12/2021    Procedure: Coronary Angiogram;  Surgeon: Fermin Polanco MD;  Location:  HEART CARDIAC CATH LAB     CV HEART CATHETERIZATION WITH POSSIBLE INTERVENTION N/A 2/26/2019    Procedure: CORS;  Surgeon: Jagdish Hoyt MD;  Location:  HEART CARDIAC CATH LAB     CV INTRA AORTIC BALLOON N/A 7/12/2021    Procedure: Intra Aortic Balloon Pump Insertion;  Surgeon: Fermin Polanco MD;  Location:  HEART CARDIAC CATH LAB     ESOPHAGOSCOPY, GASTROSCOPY, DUODENOSCOPY (EGD), COMBINED N/A 11/17/2016    Procedure: COMBINED ESOPHAGOSCOPY, GASTROSCOPY, DUODENOSCOPY (EGD);  Surgeon: Santi Rosas MD;  [facility]     ESOPHAGOSCOPY, GASTROSCOPY, DUODENOSCOPY (EGD), COMBINED N/A 11/17/2017    Procedure: COMBINED ESOPHAGOSCOPY, GASTROSCOPY, DUODENOSCOPY (EGD);  EGD;  Surgeon: Santi Rosas MD;  Location:  GI     ESOPHAGOSCOPY, GASTROSCOPY, DUODENOSCOPY (EGD), COMBINED N/A 12/28/2018    Procedure: EGD;  Surgeon: Vin Dotson  MD Santi;  [facility]: [facility] OR     ESOPHAGOSCOPY, GASTROSCOPY, DUODENOSCOPY (EGD), COMBINED N/A 2019    Procedure: ESOPHAGOGASTRODUODENOSCOPY, WITH BIOPSY;  Surgeon: Adam Morton MD;  Location:  GI     ESOPHAGOSCOPY, GASTROSCOPY, DUODENOSCOPY (EGD), COMBINED N/A 2020    Procedure: ESOPHAGOGASTRODUODENOSCOPY (EGD);  Surgeon: Santi Rosas MD;  Location:  GI     HEAD & NECK SURGERY      2017 at [facility].      IMPLANT GOLD WEIGHT EYELID Right 2017    Procedure: IMPLANT WEIGHT EYELID;  Right Upper Eyelid Weight, right tarsal strip lower eyelid;  Surgeon: Milana Malave MD;  Location: [facility] OR     IR CHEMO EMBOLIZATION  2019     KNEE SURGERY Left      ORTHOPEDIC SURGERY       PAROTIDECTOMY, RADICAL NECK DISSECTION Right 2017    Procedure: PAROTIDECTOMY, RADICAL NECK DISSECTION;  Right Superfacial Parotidectomy , Facial nerve repair. with [facility] facial nerve monitor.;  Surgeon: Asiya Morgan MD;  Location: [facility] OR     PICC INSERTION Left 2017    4fr SL BioFlo PICC, 44cm in the L basilic vein w/ tip in the low SVC     RETURN LIVER TRANSPLANT N/A 2019    Procedure: Exploratory laparotomy, hematoma evacuation, abdominal washout;  Surgeon: Александр Ramos MD;  Location: [facility] OR     TRANSPLANT LIVER RECIPIENT  DONOR N/A 2019    Procedure: TRANSPLANT, LIVER, RECIPIENT,  DONOR;  Surgeon: Александр Ramos MD;  Location: [facility] OR     VASCULAR SURGERY         ALLERGIES:     Allergies   Allergen Reactions     Codeine Other (See Comments)     Cannot take due to liver  Cannot tolerate oral narcotics     Seasonal Allergies      Sneezing, coughing, runny and itchy eyes       FAMILY HISTORY:  Family History   Problem Relation Age of Onset     Prostate Cancer Maternal Grandfather      Substance Abuse Maternal Grandfather         Alcohol     Colon Cancer Father 60     Pancreatic Cancer Father 60     Prostate Cancer Father      Colorectal Cancer Father       Macular Degeneration Father      Cancer Father      Glaucoma Father      Skin Cancer Father      Colorectal Cancer Maternal Grandmother      Cancer Maternal Grandmother      Substance Abuse Maternal Grandmother         Alcohol     Colorectal Cancer Paternal Grandmother      Cancer Mother      Diabetes Mother          3/2016     Cerebrovascular Disease Mother         Passed away in Feb of this year, 80 years old.     Thyroid Disease Mother      Depression Mother      Asthma Sister         Had since birth     Thyroid Disease Sister      Depression Sister      Liver Disease No family hx of      Melanoma No family hx of      No family history of premature CAD or sudden death.    SOCIAL HISTORY:  Social History     Tobacco Use     Smoking status: Former Smoker     Packs/day: 6.00     Years: 30.00     Pack years: 180.00     Types: Cigars     Start date: 2016     Quit date: 10/25/2017     Years since quittin.3     Smokeless tobacco: Former User     Types: Chew     Quit date: 10/31/2017     Tobacco comment: 1 tin per week   Substance Use Topics     Alcohol use: No     Alcohol/week: 0.0 standard drinks     Comment: quit 1996     Drug use: No       ROS:   A comprehensive 14 point review of systems is negative other than as mentioned in HPI.    Exam:  There were no vitals taken for this visit.    GENERAL APPEARANCE: healthy, alert and no distress  EYES: no icterus, no xanthelasmas  ENT: normal palate, mucosa moist, no central cyanosis  NECK: JVP not elevated  RESPIRATORY: lungs clear to auscultation - no rales, rhonchi or wheezes, no use of accessory muscles, no retractions, respirations are unlabored, normal respiratory rate  CARDIOVASCULAR: regular rhythm, normal S1 with physiologic split S2, no S3 or S4 and no murmur, click or rub.  GI: soft, non tender, bowel sounds normal,no abdominal bruits  EXTREMITIES: trace pitting edema in BLE  NEURO: alert and oriented to person/place/time, normal speech, gait  and affect  VASC: Radial, dorsalis pedis and posterior tibialis pulses 2+ bilaterally.  SKIN: well-healed sternotomy site, incision clean/dry/intact.  PSYCH: cooperative, affect appropriate.     Labs:  Reviewed.       Testing/Procedures:    2/26/19 Coronary angiogram:  1. Severe liver disease undergoing transplant evaluation.  2. Moderate coronary artery disease of the ostial left main (40-50% stenosis) which does not appear to be flow-limiting based on minimal luminal area of 7.2mm2 by IVUS.  3. Radial artery sheath removed and hemostasis achieved with a TR band.    Coronary angiogram 7/12/2021  Severe distal Left main disease at trifurcation of LCx, LAD, and Ramus.     Operative report 7/27/2021:  Coronary artery bypass grafting x 2.  - Left internal mammary artery to left anterior descending artery  - Reversed saphenous vein graft to obtuse marginal artery 2    Zio patch (5/26-6/2): patient triggered events were (sinus tachy 102, 99 BPM); rare isolated PVCs and couplets    I personally visualized and interpreted:  TTE 3/15/22 (images reviewed by me): Normal LV size/function, LVEF>65%. Probably normal RV size/function, RVFAC 40%. No significant valvular abnormalities. No significant change from 1/11/22.    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    1. CAD s/p CABG (LIMA to LAD and SVG to OM2) on 7/14/21 without angina  2. NSTEMI 7/2021  Patient is recovering well postoperatively.   - Continue Aspirin 81 mg daily and Rosuvastatin 5 mg daily.  - Continue Coreg 6.25 mg BID  - completed cardiac rehab    3. HTN, controlled: Continue carvedilol.    Follow up in 1 year.      Please do not hesitate to contact me if you have any questions/concerns.     Sincerely,     Manjeet Ireland MD

## 2022-03-16 ENCOUNTER — LAB (OUTPATIENT)
Dept: LAB | Facility: CLINIC | Age: 57
End: 2022-03-16
Attending: INTERNAL MEDICINE
Payer: MEDICARE

## 2022-03-16 ENCOUNTER — ALLIED HEALTH/NURSE VISIT (OUTPATIENT)
Dept: TRANSPLANT | Facility: CLINIC | Age: 57
End: 2022-03-16
Attending: INTERNAL MEDICINE
Payer: MEDICARE

## 2022-03-16 ENCOUNTER — TELEPHONE (OUTPATIENT)
Dept: PHARMACY | Facility: CLINIC | Age: 57
End: 2022-03-16

## 2022-03-16 VITALS — DIASTOLIC BLOOD PRESSURE: 58 MMHG | SYSTOLIC BLOOD PRESSURE: 99 MMHG

## 2022-03-16 DIAGNOSIS — D63.1 ANEMIA OF CHRONIC RENAL FAILURE, STAGE 3B (H): ICD-10-CM

## 2022-03-16 DIAGNOSIS — N18.32 STAGE 3B CHRONIC KIDNEY DISEASE (H): ICD-10-CM

## 2022-03-16 DIAGNOSIS — N18.32 ANEMIA OF CHRONIC RENAL FAILURE, STAGE 3B (H): ICD-10-CM

## 2022-03-16 DIAGNOSIS — N18.32 STAGE 3B CHRONIC KIDNEY DISEASE (H): Primary | ICD-10-CM

## 2022-03-16 LAB
HCT VFR BLD AUTO: 32.6 % (ref 40–53)
HGB BLD-MCNC: 9.7 G/DL (ref 13.3–17.7)

## 2022-03-16 PROCEDURE — 96372 THER/PROPH/DIAG INJ SC/IM: CPT | Performed by: INTERNAL MEDICINE

## 2022-03-16 PROCEDURE — 85018 HEMOGLOBIN: CPT | Performed by: PATHOLOGY

## 2022-03-16 PROCEDURE — 250N000011 HC RX IP 250 OP 636: Performed by: INTERNAL MEDICINE

## 2022-03-16 PROCEDURE — 85014 HEMATOCRIT: CPT | Performed by: PATHOLOGY

## 2022-03-16 PROCEDURE — 36415 COLL VENOUS BLD VENIPUNCTURE: CPT | Performed by: PATHOLOGY

## 2022-03-16 RX ADMIN — DARBEPOETIN ALFA 100 MCG: 100 INJECTION, SOLUTION INTRAVENOUS; SUBCUTANEOUS at 09:20

## 2022-03-16 NOTE — TELEPHONE ENCOUNTER
Anemia Management Note  SUBJECTIVE/OBJECTIVE:  Referred by Dr. Eloy Rao on 2021  Primary Diagnosis: Anemia in Chronic Kidney Disease (N18.3, D63.1)   3b  Secondary Diagnosis:  Chronic Kidney Disease, Stage 3 (N18.3)  3b  Liver Tx: 2019  Hgb goal range:  9-10  Epo/Darbo: Aranesp 100mcg every 7 days for Hgb <10.  In Clinic.  *22: dose changed to weekly per Dr. Rao  *dose increased to 100mcg starting 22  *dose increased to 60mcg starting with  21 dose  Iron regimen:  NA.  Iron levels stable  Labs : 2022  Recent IVELISSE use, transfusion, IV iron: Aranesp   RX/TX plans :  6/10/2022     No history of stroke, MI and blood clots.  History of hepatocellular carcinoma - s/p TACE 2019 and liver transplant 2019.     Contact:            Ok to leave message regarding scheduling, medical, and billing per consent to communicate dated 10/2/19                              OK to speak with Davi Saunders (friend/POA) regarding scheduling, medical, and billing per consent to communicate dated 10/2/19    Anemia Latest Ref Rng & Units 2022 2022 2022 2022 3/2/2022 3/9/2022 3/16/2022   IVELISSE Dose - - 100 mcg 100 mcg 100 mcg 100 mcg 100 mcg 100 mcg   Hemoglobin 13.3 - 17.7 g/dL - 7.9(L) 7.9(L) 9.0(L) 8.6(L) 9.5(L) 9.7(L)   TSAT 15 - 46 % - 39 - - - - -   Ferritin 26 - 388 ng/mL - 1,046(H) - - - - -   PRBCs - 1 unit  - - - - - -     BP Readings from Last 3 Encounters:   22 99/58   03/15/22 100/62   03/15/22 101/63     Wt Readings from Last 2 Encounters:   03/15/22 159 lb 4.8 oz (72.3 kg)   03/15/22 160 lb (72.6 kg)           ASSESSMENT:  Hgb:at goal - received dose in clinic - recommend continue current regimen  TSat: at goal >30% Ferritin: Elevated (>1000ng/mL)    PLAN:  Dose with aranesp and RTC for hgb then aranesp if needed in 1 week(s)    Orders needed to be renewed (for next follow-up date) in EPIC: None    Iron labs due:  May 2022    Plan discussed with:  No  call to pt, spoke with Melani GRIMM      NEXT FOLLOW-UP DATE:  3/2322 9am appt    Jie Blum RN   [facility]  [address] [address]   [email or web address]   Office : [phone]  Fax: [phone]

## 2022-03-16 NOTE — NURSING NOTE
Frequency: Every 7 days  Most recent or today's HGB: 9.7   Date: 3/16/2022  Date of lat dose: 3/9/2022  HGB associated with last dose given: 9.5    Dose went up 1.1 in the last 2 weeks, spoke with Jie Blum in Anemia Services and she OK'd dose.     Blood Pressure:95/58    Diagnosis: Anemia in CKD stage 3b    Ordered by: Eloy Rao MD  VIS Offered: Yes    Double Checked by: Nereida WOODWARD CMA    See MAR for administration details    Pt's first name, last name and  verified prior to medication administration, injection given without complications or questions.

## 2022-03-17 ENCOUNTER — OFFICE VISIT (OUTPATIENT)
Dept: OPHTHALMOLOGY | Facility: CLINIC | Age: 57
End: 2022-03-17
Attending: OPHTHALMOLOGY
Payer: MEDICARE

## 2022-03-17 DIAGNOSIS — E11.3313 TYPE 2 DIABETES MELLITUS WITH MODERATE NONPROLIFERATIVE RETINOPATHY OF BOTH EYES AND MACULAR EDEMA, UNSPECIFIED WHETHER LONG TERM INSULIN USE (H): Primary | ICD-10-CM

## 2022-03-17 PROCEDURE — 92134 CPTRZ OPH DX IMG PST SGM RTA: CPT | Performed by: OPHTHALMOLOGY

## 2022-03-17 PROCEDURE — 92235 FLUORESCEIN ANGRPH MLTIFRAME: CPT | Performed by: OPHTHALMOLOGY

## 2022-03-17 PROCEDURE — 92012 INTRM OPH EXAM EST PATIENT: CPT | Mod: GC | Performed by: OPHTHALMOLOGY

## 2022-03-17 PROCEDURE — G0463 HOSPITAL OUTPT CLINIC VISIT: HCPCS | Mod: 25

## 2022-03-17 ASSESSMENT — REFRACTION_WEARINGRX
SPECS_TYPE: PAL
OS_ADD: +2.00
OS_CYLINDER: +0.50
OD_SPHERE: -7.00
OD_CYLINDER: +0.75
OS_SPHERE: -6.75
OD_AXIS: 131
OD_ADD: +2.00
OS_AXIS: 044

## 2022-03-17 ASSESSMENT — VISUAL ACUITY
OD_CC: 20/30
CORRECTION_TYPE: GLASSES
OD_PH_CC+: -1
METHOD: SNELLEN - LINEAR
OS_PH_CC: 20/40
OD_PH_CC: 20/30
OS_CC: 20/40
OS_PH_CC+: +2
OD_CC+: -3

## 2022-03-17 ASSESSMENT — CUP TO DISC RATIO
OS_RATIO: 0.3
OD_RATIO: 0.25

## 2022-03-17 ASSESSMENT — CONF VISUAL FIELD
OD_NORMAL: 1
OS_NORMAL: 1

## 2022-03-17 ASSESSMENT — EXTERNAL EXAM - RIGHT EYE: OD_EXAM: NORMAL

## 2022-03-17 ASSESSMENT — TONOMETRY
OS_IOP_MMHG: 16
IOP_METHOD: TONOPEN
OD_IOP_MMHG: 18

## 2022-03-17 ASSESSMENT — SLIT LAMP EXAM - LIDS
COMMENTS: NORMAL
COMMENTS: SMALL PAPILLOMA ALONG LL MARGIN

## 2022-03-17 ASSESSMENT — EXTERNAL EXAM - LEFT EYE: OS_EXAM: NORMAL

## 2022-03-17 NOTE — PROGRESS NOTES
CC:  Follow up Diabetic macular edema and retinopathy     HPI: Frandy Workman is a  56 year old year-old patient with history of Diabetes mellitus for 24 ys . Patient on insulin.     Interval History: Vision is stable.   Last HbA1c 6 per pt     Retinal Imaging:  OCT 3/17/2022   RE: resolved Diabetic macular edema. Vitreous attached  LE: resolved Diabetic macular edema, mild Epiretinal membrane; trace cystic changes     fluorescein angiography transits right eye 3/17/2022 -  Swallowed dye, unable to place IV  Right eye: MA, no macular leakage,  No NV, no significant non-perfusion  Left eye: MA, no macular leakage,  No NV, no significant non-perfusion     Optos consistent with clinical exam    Assessment & Plan:  # Diabetic macular edema both eyes    - status post avastin x 4. Switch to Eylea 4/24/19 with improvement of Diabetic macular edema    - now with resolved Diabetic macular edema both eyes    - Last Eylea injection was 5/2021 both eyes    - plan to observe    # severe nonproliferative diabetic retinopathy of both eyes    - HbA1c 6.3% (6/2020)   - fluorescein angiography 3/17/2022 limited by oral dye. No NVE, no significant non perfusion   - Blood pressure (<120/80) and blood glucose (HbA1c <7.0, ~6.5 today) control discussed with patient. Patient advised that failure to adequately control each may lead to vision loss. The patient expressed understanding.   - Keep excellent sugar control. DFE 4 mo. Yearly FA     #. Senile nuclear sclerosis, bilateral  becoming visually significant both eyes  Patient on flomax   Patient considering Cataract extraction intraocular lens this yr     # Dry eye syndrome   Left eye worse than right eye   warm compresses and artificial tears  As needed  -punctal plugs previously left eye - reports this is better     # Status post liver transplant   - tx 11/2019   - severe anemia; s/p transfusion - anemia much improved    - being seen by his primary team    Plan: 4 mo DFE, OCT  helen Chamorro MD  Vitreoretinal Surgery Fellow  [facility]     ~~~~~~~~~~~~~~~~~~~~~~~~~~~~~~~~~~   Complete documentation of historical and exam elements from today's encounter can be found in the full encounter summary report (not reduplicated in this progress note).  I personally obtained the chief complaint(s) and history of present illness.  I confirmed and edited as necessary the review of systems, past medical/surgical history, family history, social history, and examination findings as documented by others; and I examined the patient myself.  I personally reviewed the relevant tests, images, and reports as documented above.  I personally reviewed the ophthalmic test(s) associated with this encounter, agree with the interpretation(s) as documented by the resident/fellow, and have edited the corresponding report(s) as necessary.   I formulated and edited as necessary the assessment and plan and discussed the findings and management plan with the patient and family    Enriqueta Martin MD   of Ophthalmology.  Retina Service   Department of Ophthalmology and Visual Neurosciences   [facility]  Phone: [phone]   Fax: [phone]

## 2022-03-17 NOTE — NURSING NOTE
Chief Complaints and History of Present Illnesses   Patient presents with     Follow Up     Chief Complaint(s) and History of Present Illness(es)     Follow Up     Laterality: both eyes    Associated symptoms: eye pain, dryness and floaters.  Negative for flashes and itching    Treatments tried: artificial tears    Pain scale: 3/10              Comments     2 month Follow up Diabetic macular edema  Pt reports no change in vision since last visit   Blood sugar 187, last A1C 6 taken 3/15/22  Art tears bid BE  Catherine LEONG 9:00 AM March 17, 2022

## 2022-03-21 ENCOUNTER — OFFICE VISIT (OUTPATIENT)
Dept: GASTROENTEROLOGY | Facility: CLINIC | Age: 57
End: 2022-03-21
Attending: INTERNAL MEDICINE
Payer: MEDICARE

## 2022-03-21 VITALS
WEIGHT: 168.8 LBS | SYSTOLIC BLOOD PRESSURE: 99 MMHG | HEIGHT: 69 IN | TEMPERATURE: 98.1 F | HEART RATE: 84 BPM | DIASTOLIC BLOOD PRESSURE: 64 MMHG | BODY MASS INDEX: 25 KG/M2

## 2022-03-21 DIAGNOSIS — Z94.4 STATUS POST LIVER TRANSPLANTATION (H): Primary | ICD-10-CM

## 2022-03-21 PROCEDURE — 99215 OFFICE O/P EST HI 40 MIN: CPT | Performed by: INTERNAL MEDICINE

## 2022-03-21 PROCEDURE — G0463 HOSPITAL OUTPT CLINIC VISIT: HCPCS

## 2022-03-21 ASSESSMENT — PATIENT HEALTH QUESTIONNAIRE - PHQ9
10. IF YOU CHECKED OFF ANY PROBLEMS, HOW DIFFICULT HAVE THESE PROBLEMS MADE IT FOR YOU TO DO YOUR WORK, TAKE CARE OF THINGS AT HOME, OR GET ALONG WITH OTHER PEOPLE: NOT DIFFICULT AT ALL
SUM OF ALL RESPONSES TO PHQ QUESTIONS 1-9: 6
SUM OF ALL RESPONSES TO PHQ QUESTIONS 1-9: 6

## 2022-03-21 ASSESSMENT — PAIN SCALES - GENERAL: PAINLEVEL: MODERATE PAIN (4)

## 2022-03-21 NOTE — PROGRESS NOTES
[facility] Hepatology    Follow-up Visit    Follow-up visit for liver transplant    Subjective:  57 year old male    OLT 11/11/19  - ESTRADA/HCC  - mikaela-op MELD= 12  - donor- donor age 63/local/DBD/ECD- hep B core positive/donor CMV(+)/recipient CMV(-)  - operation- CiT 7:39, EBL 2L, piggyback IVC, duct-to-duct biliary anastomosis  - post-op course- perihepatic hematoma; MMF colitis Dec 2019  - immunosuppression- MMF 500mg Q12, pred 5     HCC  - dx Dec 2018  - MRI liver 12/23/18- 3.1cm lesion segment IVB, 1.1cm lesion segment III  - AFP 12/28/18= 5.2  - bone scan 1/4/19  - CT chest 1/4/19  - TACE 1/22/19 (seg IV); microwave ablation 1/23/19 (seg III)  - explant pathology- no viable tumor; moderately differentiated; no vascular invasion  - last MRI liver 9/25/2020, with no evidence of recurrence  - last imaging CT C/A/P Nov 2021  - last AFP Nov 2021 <1.5    Last visit 03/2021.  The patient underwent coronary artery bypass surgery x2 in 07/20/2021.  He was admitted to the hospital in 01/2022 with pleuritic-type chest pain of unclear etiology.  Atypical pneumonia (including fungal) tests all returned negative.  No new medication changes since last visit.    The patient is well today.  He reports occasional right upper quadrant pain, which is not related to eating or bowel movements.    The patient denies jaundice or lower extremity edema.    The patient denies nausea, vomiting, constipation or diarrhea.    The patient denies fever, sweats or chills.  He is vaccinated fully against COVID-19.    Weight is stable.  Appetite is normal.    The patient is fairly adherent to his medications.  He has no issues with obtaining his medications through his pharmacy or health insurance.    The patient does not drink alcohol.      Health maintenance  Skin check due now  Colonoscopy due 2024  Lipids Sep 2021  Vaccinations up to date  Bone density Feb 2019    Medical hx Surgical hx   Past Medical History:   Diagnosis Date      Anemia 2013     Arthritis      BPH (benign prostatic hyperplasia)      CAD (coronary artery disease) 4/1/2019     Cholelithiasis      Conductive hearing loss 08/16/2017     Depressive disorder 1986    Suffer effects throughout life     Gastroesophageal reflux disease 12/01/2014     HCC (hepatocellular carcinoma) (H) 1/22/2019     History of diabetic retinopathy 07/2018     HTN (hypertension)      HTN (hypertension) 11/20/2019     Hyperlipidemia      Liver cirrhosis secondary to ESTRADA (H)      Liver transplanted (H) 11/11/2019     Portal vein thrombosis 4/11/2019     Type II diabetes mellitus (H)       Past Surgical History:   Procedure Laterality Date     BYPASS GRAFT ARTERY CORONARY N/A 7/14/2021    Procedure: median sternotomy, on cardiopulmonary bypass, CORONARY ARTERY BYPASS GRAFT (CABG) x2 with left greater saphenous vein endoscopic harvest and left internal mammery artery harvest;  Surgeon: Tom Zapata MD;  [facility]     COLONOSCOPY      2015     COLONOSCOPY N/A 12/6/2019    Procedure: COLONOSCOPY, WITH POLYPECTOMY AND BIOPSY;  Surgeon: Adam Morton MD;  [facility]     CV CENTRAL VENOUS CATHETER PLACEMENT N/A 7/12/2021    Procedure: Central Venous Catheter Placement;  Surgeon: Fermin Polanco MD;  Location:  HEART CARDIAC CATH LAB     CV CORONARY ANGIOGRAM N/A 7/12/2021    Procedure: Coronary Angiogram;  Surgeon: Fermin Polanco MD;  Location:  HEART CARDIAC CATH LAB     CV HEART CATHETERIZATION WITH POSSIBLE INTERVENTION N/A 2/26/2019    Procedure: CORS;  Surgeon: Jagdish Hoyt MD;  Location:  HEART CARDIAC CATH LAB     CV INTRA AORTIC BALLOON N/A 7/12/2021    Procedure: Intra Aortic Balloon Pump Insertion;  Surgeon: Fermin Polanco MD;  Location:  HEART CARDIAC CATH LAB     ESOPHAGOSCOPY, GASTROSCOPY, DUODENOSCOPY (EGD), COMBINED N/A 11/17/2016    Procedure: COMBINED ESOPHAGOSCOPY, GASTROSCOPY, DUODENOSCOPY (EGD);   Surgeon: Santi Rosas MD;  [facility]     ESOPHAGOSCOPY, GASTROSCOPY, DUODENOSCOPY (EGD), COMBINED N/A 2017    Procedure: COMBINED ESOPHAGOSCOPY, GASTROSCOPY, DUODENOSCOPY (EGD);  EGD;  Surgeon: Santi Rosas MD;  [facility]     ESOPHAGOSCOPY, GASTROSCOPY, DUODENOSCOPY (EGD), COMBINED N/A 2018    Procedure: EGD;  Surgeon: Santi Rosas MD;  [facility]     ESOPHAGOSCOPY, GASTROSCOPY, DUODENOSCOPY (EGD), COMBINED N/A 2019    Procedure: ESOPHAGOGASTRODUODENOSCOPY, WITH BIOPSY;  Surgeon: Adam Morton MD;  [facility]     ESOPHAGOSCOPY, GASTROSCOPY, DUODENOSCOPY (EGD), COMBINED N/A 2020    Procedure: ESOPHAGOGASTRODUODENOSCOPY (EGD);  Surgeon: Santi Rosas MD;  [facility]     HEAD & NECK SURGERY      2017 at [facility].      IMPLANT GOLD WEIGHT EYELID Right 2017    Procedure: IMPLANT WEIGHT EYELID;  Right Upper Eyelid Weight, right tarsal strip lower eyelid;  Surgeon: Milana Malave MD;  [facility]     IR CHEMO EMBOLIZATION  2019     KNEE SURGERY Left      ORTHOPEDIC SURGERY       PAROTIDECTOMY, RADICAL NECK DISSECTION Right 2017    Procedure: PAROTIDECTOMY, RADICAL NECK DISSECTION;  Right Superfacial Parotidectomy , Facial nerve repair. with [facility] facial nerve monitor.;  Surgeon: Asiya Morgan MD;  [facility]     PICC INSERTION Left 2017    4fr SL BioFlo PICC, 44cm in the L basilic vein w/ tip in the low SVC     RETURN LIVER TRANSPLANT N/A 2019    Procedure: Exploratory laparotomy, hematoma evacuation, abdominal washout;  Surgeon: Александр Ramos MD;  [facility]     TRANSPLANT LIVER RECIPIENT  DONOR N/A 2019    Procedure: TRANSPLANT, LIVER, RECIPIENT,  DONOR;  Surgeon: Александр Ramos MD;  [facility]     VASCULAR SURGERY            Medications  Prior to Admission medications    Medication Sig Start Date End Date Taking? Authorizing Provider    Acetaminophen (TYLENOL) 325 MG CAPS Take 325-650 mg by mouth every 4 hours as needed (pain, fever) 7/14/20  Yes Nerissa Moe MD   ARIPiprazole (ABILIFY) 5 MG tablet daily 8/14/20  Yes Reported, Patient   aspirin ( ASPIRIN ADULT LOW STRENGTH) 81 MG EC tablet Take 2 tablets (162 mg) by mouth daily 9/20/21  Yes Nerissa Moe MD   BD VIKTORIA U/F 32G X 4 MM insulin pen needle Use 5 per day 7/20/20  Yes Tish Toscano PA-C   ciclopirox (LOPROX) 0.77 % cream Apply topically 2 times daily To feet and toenails. 7/21/20  Yes Lamin Gonzalez DPM   Continuous Blood Gluc  (FREESTYLE CLAUDIA 14 DAY READER) CECILIO Use to read blood sugars as per 's instructions. 7/10/20  Yes Tish Toscano PA-C   Continuous Blood Gluc Sensor (FREESTYLE CLAUDIA 2 SENSOR) MISC 1 each See Admin Instructions Change every 14 days. 12/24/21  Yes Tish Toscano PA-C   cyanocobalamin (VITAMIN B-12) 1000 MCG tablet TAKE 1 TABLET (1,000 MCG) BY MOUTH DAILY 10/15/21  Yes Santi Rosas MD   empagliflozin (JARDIANCE) 10 MG TABS tablet Take 1 tablet (10 mg) by mouth daily 10/4/21  Yes Eloy Rao MD   insulin aspart (NOVOLOG PEN) 100 UNIT/ML pen Inject 2-7 Units Subcutaneous 4 times daily (with meals and nightly) 1unit : 20 g carb before meals.  Also add 1 unit : 50 mg/dl >180 before meals and at bedtime. 3/15/22  Yes Tish Toscano PA-C   insulin degludec (TRESIBA FLEXTOUCH) 100 UNIT/ML pen Inject 26 units subcutaneous once daily 12/6/21  Yes Tish Toscano PA-C   lamiVUDine (EPIVIR) 100 MG tablet Take 1 tablet (100 mg) by mouth daily 3/29/21  Yes Santi Rosas MD   lisinopril (ZESTRIL) 5 MG tablet Take 1 tablet (5 mg) by mouth daily 10/4/21  Yes Eloy Rao MD   methocarbamol (ROBAXIN) 750 MG tablet Take 1 tablet (750 mg) by mouth 3 times daily as needed for muscle spasms (sternal pain) 7/22/21  Yes David Rodríguez PA   metoprolol succinate ER (TOPROL-XL) 25  "MG 24 hr tablet Take 0.5 tablets (12.5 mg) by mouth daily 7/30/21  Yes Yolanda Hill PA-TAPAN   Multiple Vitamin (TAB-A-GLADIS) TABS Take 1 tablet by mouth daily 9/9/21  Yes Nerissa Moe MD   mycophenolate (GENERIC EQUIVALENT) 250 MG capsule Take 2 capsules (500 mg) by mouth every 12 hours 2/3/22  Yes Santi Rosas MD   order for DME 1 Device by Device route daily Knee high compression socks 15-20 mmhg. 7/10/20  Yes Lamin Gonzalez DPM   pantoprazole (PROTONIX) 40 MG EC tablet Take 1 tablet (40 mg) by mouth daily before breakfast 9/6/21  Yes Nerissa Moe MD   polyethylene glycol (MIRALAX) 17 g packet Take 1 packet by mouth daily   Yes Unknown, Entered By History   predniSONE (DELTASONE) 5 MG tablet TAKE ONE TABLET BY MOUTH ONCE DAILY 12/20/21  Yes Santi Rosas MD   rosuvastatin (CRESTOR) 5 MG tablet Take 1 tablet (5 mg) by mouth daily 8/3/21  Yes Manjeet Ireland MD   senna-docusate (SENOKOT-S/PERICOLACE) 8.6-50 MG tablet Take 1 tablet by mouth 2 times daily as needed for constipation 7/22/21  Yes David Rodríguez PA   tamsulosin (FLOMAX) 0.4 MG capsule Take 1 capsule (0.4 mg) by mouth daily 3/3/22  Yes Layton Romero MD   vitamin D3 (CHOLECALCIFEROL) 50 mcg (2000 units) tablet Take 1 tablet (50 mcg) by mouth daily 4/19/21  Yes Nerissa Moe MD       Allergies  Allergies   Allergen Reactions     Codeine Other (See Comments)     Cannot take due to liver  Cannot tolerate oral narcotics     Seasonal Allergies      Sneezing, coughing, runny and itchy eyes       Review of systems  A 10-point review of systems was negative    Examination  BP 99/64   Pulse 84   Temp 98.1  F (36.7  C) (Oral)   Ht 1.753 m (5' 9\")   Wt 76.6 kg (168 lb 12.8 oz)   BMI 24.93 kg/m    Body mass index is 24.93 kg/m .    Gen- well, NAD, A+Ox3, normal color  CVS- RRR  RS- CTA  Abd- OLT scar, SNT, BS+  Extr- hands normal, no LEILA  Skin- no rash or jaundice  Neuro- no tremor  Psych- " normal mood    Laboratory  Lab Results   Component Value Date     03/15/2022     06/28/2021    POTASSIUM 5.3 03/15/2022    POTASSIUM 4.6 06/28/2021    CHLORIDE 110 03/15/2022    CHLORIDE 107 06/28/2021    CO2 24 03/15/2022    CO2 25 06/28/2021    BUN 46 03/15/2022    BUN 33 06/28/2021    CR 1.72 03/15/2022    CR 1.62 06/28/2021       Lab Results   Component Value Date    BILITOTAL 0.4 02/03/2022    BILITOTAL 0.5 06/28/2021    ALT 20 02/03/2022    ALT 20 06/28/2021    AST 15 02/03/2022    AST 9 06/28/2021    ALKPHOS 99 02/03/2022    ALKPHOS 98 06/28/2021       Lab Results   Component Value Date    ALBUMIN 3.8 02/03/2022    ALBUMIN 4.0 06/28/2021    PROTTOTAL 6.8 02/03/2022    PROTTOTAL 7.4 06/28/2021        Lab Results   Component Value Date    WBC 2.2 02/03/2022    WBC 4.4 06/28/2021    HGB 9.7 03/16/2022    HGB 7.3 06/28/2021    MCV 98 02/03/2022    MCV 96 06/28/2021     02/03/2022     06/28/2021       Lab Results   Component Value Date    INR 1.13 01/11/2022    INR 1.09 01/24/2020       Radiology  Liver transplant doppler U/S Jan 2022 personally reviewed- normal liver    Assessment  57-year-old male with history of liver transplant in 2019 for HCC complicated with CKD who presents for routine follow-up.  Liver function tests normal on current immunosuppression.  Renal function stable on calcineurin inhibitor-free immunosuppression regimen.  No evidence of recurrent HCC on post-transplant surveillance imaging.  Abdominal pain is musculoskeletal based on physical examination.  Will need dermatology referral for skin check but otherwise up to date with routine health maintenance.    Plan  1.  Liver transplant  - continue MMF 500mg Q12  - continue pred 5mg PO Q24    2.  CKD  - follow-up with nephrology    3.  Hx HCC  - imaging per oncology    4.  Health care maintenance  - dermatology referral   - DEXA scan    Follow-up in 12 months    Santi Banuelos MD   Hepatology  [facility]  spent 40 minutes on the date of the encounter doing chart review, history and exam, documentation and further activities as noted above.

## 2022-03-21 NOTE — NURSING NOTE
"Chief Complaint   Patient presents with     RECHECK     follw up with liver transplant     BP 99/64   Pulse 84   Temp 98.1  F (36.7  C) (Oral)   Ht 1.753 m (5' 9\")   Wt 76.6 kg (168 lb 12.8 oz)   BMI 24.93 kg/m    Rhonda Middleton CMA on 3/21/2022 at 10:27 AM    "

## 2022-03-21 NOTE — LETTER
3/21/2022         RE: Frandy Workman  [address], [place] [address]        Dear Colleague,    Thank you for referring your patient, Frandy Workman, to the [facility]PATOLOG[facility] [place]. Please see a copy of my visit note below.    [facility] Hepatology    Follow-up Visit    Follow-up visit for liver transplant    Subjective:  57 year old male    OLT 11/11/19  - ESTRADA/HCC  - mikaela-op MELD= 12  - donor- donor age 63/local/DBD/ECD- hep B core positive/donor CMV(+)/recipient CMV(-)  - operation- CiT 7:39, EBL 2L, piggyback IVC, duct-to-duct biliary anastomosis  - post-op course- perihepatic hematoma; MMF colitis Dec 2019  - immunosuppression- MMF 500mg Q12, pred 5     HCC  - dx Dec 2018  - MRI liver 12/23/18- 3.1cm lesion segment IVB, 1.1cm lesion segment III  - AFP 12/28/18= 5.2  - bone scan 1/4/19  - CT chest 1/4/19  - TACE 1/22/19 (seg IV); microwave ablation 1/23/19 (seg III)  - explant pathology- no viable tumor; moderately differentiated; no vascular invasion  - last MRI liver 9/25/2020, with no evidence of recurrence  - last imaging CT C/A/P Nov 2021  - last AFP Nov 2021 <1.5    Last visit 03/2021.  The patient underwent coronary artery bypass surgery x2 in 07/20/2021.  He was admitted to the hospital in 01/2022 with pleuritic-type chest pain of unclear etiology.  Atypical pneumonia (including fungal) tests all returned negative.  No new medication changes since last visit.    The patient is well today.  He reports occasional right upper quadrant pain, which is not related to eating or bowel movements.    The patient denies jaundice or lower extremity edema.    The patient denies nausea, vomiting, constipation or diarrhea.    The patient denies fever, sweats or chills.  He is vaccinated fully against COVID-19.    Weight is stable.  Appetite is normal.    The patient is fairly adherent to his medications.  He has no issues with obtaining his medications through his  pharmacy or health insurance.    The patient does not drink alcohol.      Health maintenance  Skin check due now  Colonoscopy due 2024  Lipids Sep 2021  Vaccinations up to date  Bone density Feb 2019    Medical hx Surgical hx   Past Medical History:   Diagnosis Date     Anemia 2013     Arthritis      BPH (benign prostatic hyperplasia)      CAD (coronary artery disease) 4/1/2019     Cholelithiasis      Conductive hearing loss 08/16/2017     Depressive disorder 1986    Suffer effects throughout life     Gastroesophageal reflux disease 12/01/2014     HCC (hepatocellular carcinoma) (H) 1/22/2019     History of diabetic retinopathy 07/2018     HTN (hypertension)      HTN (hypertension) 11/20/2019     Hyperlipidemia      Liver cirrhosis secondary to ESTRADA (H)      Liver transplanted (H) 11/11/2019     Portal vein thrombosis 4/11/2019     Type II diabetes mellitus (H)       Past Surgical History:   Procedure Laterality Date     BYPASS GRAFT ARTERY CORONARY N/A 7/14/2021    Procedure: median sternotomy, on cardiopulmonary bypass, CORONARY ARTERY BYPASS GRAFT (CABG) x2 with left greater saphenous vein endoscopic harvest and left internal mammery artery harvest;  Surgeon: Tom Zapata MD;  [facility]     COLONOSCOPY      2015     COLONOSCOPY N/A 12/6/2019    Procedure: COLONOSCOPY, WITH POLYPECTOMY AND BIOPSY;  Surgeon: Adam Morton MD;  Location:  GI     CV CENTRAL VENOUS CATHETER PLACEMENT N/A 7/12/2021    Procedure: Central Venous Catheter Placement;  Surgeon: Fermin Polanco MD;  Location:  HEART CARDIAC CATH LAB     CV CORONARY ANGIOGRAM N/A 7/12/2021    Procedure: Coronary Angiogram;  Surgeon: Fermin Polanco MD;  Location:  HEART CARDIAC CATH LAB     CV HEART CATHETERIZATION WITH POSSIBLE INTERVENTION N/A 2/26/2019    Procedure: CORS;  Surgeon: Jagdish Hoyt MD;  Location:  HEART CARDIAC CATH LAB     CV INTRA AORTIC BALLOON N/A 7/12/2021    Procedure:  Intra Aortic Balloon Pump Insertion;  Surgeon: Fermin Polanco MD;  Location:  HEART CARDIAC CATH LAB     ESOPHAGOSCOPY, GASTROSCOPY, DUODENOSCOPY (EGD), COMBINED N/A 2016    Procedure: COMBINED ESOPHAGOSCOPY, GASTROSCOPY, DUODENOSCOPY (EGD);  Surgeon: Santi Rosas MD;  Location:  GI     ESOPHAGOSCOPY, GASTROSCOPY, DUODENOSCOPY (EGD), COMBINED N/A 2017    Procedure: COMBINED ESOPHAGOSCOPY, GASTROSCOPY, DUODENOSCOPY (EGD);  EGD;  Surgeon: Santi Rosas MD;  Location:  GI     ESOPHAGOSCOPY, GASTROSCOPY, DUODENOSCOPY (EGD), COMBINED N/A 2018    Procedure: EGD;  Surgeon: Santi Rosas MD;  Location:  OR     ESOPHAGOSCOPY, GASTROSCOPY, DUODENOSCOPY (EGD), COMBINED N/A 2019    Procedure: ESOPHAGOGASTRODUODENOSCOPY, WITH BIOPSY;  Surgeon: Adam Morton MD;  Location:  GI     ESOPHAGOSCOPY, GASTROSCOPY, DUODENOSCOPY (EGD), COMBINED N/A 2020    Procedure: ESOPHAGOGASTRODUODENOSCOPY (EGD);  Surgeon: Santi Rosas MD;  Location:  GI     HEAD & NECK SURGERY      2017 at [facility].      IMPLANT GOLD WEIGHT EYELID Right 2017    Procedure: IMPLANT WEIGHT EYELID;  Right Upper Eyelid Weight, right tarsal strip lower eyelid;  Surgeon: Milana Malave MD;  [facility]     IR CHEMO EMBOLIZATION  2019     KNEE SURGERY Left      ORTHOPEDIC SURGERY       PAROTIDECTOMY, RADICAL NECK DISSECTION Right 2017    Procedure: PAROTIDECTOMY, RADICAL NECK DISSECTION;  Right Superfacial Parotidectomy , Facial nerve repair. with [facility] facial nerve monitor.;  Surgeon: Asiya Morgan MD;  [facility]     PICC INSERTION Left 2017    4fr SL BioFlo PICC, 44cm in the L basilic vein w/ tip in the low SVC     RETURN LIVER TRANSPLANT N/A 2019    Procedure: Exploratory laparotomy, hematoma evacuation, abdominal washout;  Surgeon: Александр Ramos MD;  [facility]     TRANSPLANT LIVER RECIPIENT  DONOR N/A  2019    Procedure: TRANSPLANT, LIVER, RECIPIENT,  DONOR;  Surgeon: Александр Ramos MD;  [facility]     VASCULAR SURGERY            Medications  Prior to Admission medications    Medication Sig Start Date End Date Taking? Authorizing Provider   Acetaminophen (TYLENOL) 325 MG CAPS Take 325-650 mg by mouth every 4 hours as needed (pain, fever) 20  Yes Nerissa Moe MD   ARIPiprazole (ABILIFY) 5 MG tablet daily 20  Yes Reported, Patient   aspirin ( ASPIRIN ADULT LOW STRENGTH) 81 MG EC tablet Take 2 tablets (162 mg) by mouth daily 21  Yes Nerissa Moe MD   BD VIKTORIA U/F 32G X 4 MM insulin pen needle Use 5 per day 20  Yes Tish Toscano PA-C   ciclopirox (LOPROX) 0.77 % cream Apply topically 2 times daily To feet and toenails. 20  Yes Lamin Gonzalez DPM   Continuous Blood Gluc  (FREESTYLE CLAUDIA 14 DAY READER) CECILIO Use to read blood sugars as per 's instructions. 7/10/20  Yes Tish Toscano PA-C   Continuous Blood Gluc Sensor (FREESTYLE CLAUDIA 2 SENSOR) MISC 1 each See Admin Instructions Change every 14 days. 21  Yes Tish Toscano PA-C   cyanocobalamin (VITAMIN B-12) 1000 MCG tablet TAKE 1 TABLET (1,000 MCG) BY MOUTH DAILY 10/15/21  Yes Santi Rosas MD   empagliflozin (JARDIANCE) 10 MG TABS tablet Take 1 tablet (10 mg) by mouth daily 10/4/21  Yes Eloy Rao MD   insulin aspart (NOVOLOG PEN) 100 UNIT/ML pen Inject 2-7 Units Subcutaneous 4 times daily (with meals and nightly) 1unit : 20 g carb before meals.  Also add 1 unit : 50 mg/dl >180 before meals and at bedtime. 3/15/22  Yes Tish Toscano PA-C   insulin degludec (TRESIBA FLEXTOUCH) 100 UNIT/ML pen Inject 26 units subcutaneous once daily 21  Yes Tish Toscano PA-C   lamiVUDine (EPIVIR) 100 MG tablet Take 1 tablet (100 mg) by mouth daily 3/29/21  Yes Santi Rosas MD   lisinopril (ZESTRIL) 5 MG tablet Take 1 tablet (5 mg) by  mouth daily 10/4/21  Yes Eloy Rao MD   methocarbamol (ROBAXIN) 750 MG tablet Take 1 tablet (750 mg) by mouth 3 times daily as needed for muscle spasms (sternal pain) 7/22/21  Yes David Rodríguez PA   metoprolol succinate ER (TOPROL-XL) 25 MG 24 hr tablet Take 0.5 tablets (12.5 mg) by mouth daily 7/30/21  Yes Yolanda Hill PA-C   Multiple Vitamin (TAB-A-GLADIS) TABS Take 1 tablet by mouth daily 9/9/21  Yes Nerissa Moe MD   mycophenolate (GENERIC EQUIVALENT) 250 MG capsule Take 2 capsules (500 mg) by mouth every 12 hours 2/3/22  Yes Santi Rosas MD   order for DME 1 Device by Device route daily Knee high compression socks 15-20 mmhg. 7/10/20  Yes Lamin Gonzalez DPM   pantoprazole (PROTONIX) 40 MG EC tablet Take 1 tablet (40 mg) by mouth daily before breakfast 9/6/21  Yes Nerissa Moe MD   polyethylene glycol (MIRALAX) 17 g packet Take 1 packet by mouth daily   Yes Unknown, Entered By History   predniSONE (DELTASONE) 5 MG tablet TAKE ONE TABLET BY MOUTH ONCE DAILY 12/20/21  Yes Santi Rosas MD   rosuvastatin (CRESTOR) 5 MG tablet Take 1 tablet (5 mg) by mouth daily 8/3/21  Yes Manjeet Ireland MD   senna-docusate (SENOKOT-S/PERICOLACE) 8.6-50 MG tablet Take 1 tablet by mouth 2 times daily as needed for constipation 7/22/21  Yes David Rodríguez PA   tamsulosin (FLOMAX) 0.4 MG capsule Take 1 capsule (0.4 mg) by mouth daily 3/3/22  Yes Layton Romero MD   vitamin D3 (CHOLECALCIFEROL) 50 mcg (2000 units) tablet Take 1 tablet (50 mcg) by mouth daily 4/19/21  Yes Nerissa Moe MD       Allergies  Allergies   Allergen Reactions     Codeine Other (See Comments)     Cannot take due to liver  Cannot tolerate oral narcotics     Seasonal Allergies      Sneezing, coughing, runny and itchy eyes       Review of systems  A 10-point review of systems was negative    Examination  BP 99/64   Pulse 84   Temp 98.1  F (36.7  C) (Oral)   Ht  "1.753 m (5' 9\")   Wt 76.6 kg (168 lb 12.8 oz)   BMI 24.93 kg/m    Body mass index is 24.93 kg/m .    Gen- well, NAD, A+Ox3, normal color  CVS- RRR  RS- CTA  Abd- OLT scar, SNT, BS+  Extr- hands normal, no LEILA  Skin- no rash or jaundice  Neuro- no tremor  Psych- normal mood    Laboratory  Lab Results   Component Value Date     03/15/2022     06/28/2021    POTASSIUM 5.3 03/15/2022    POTASSIUM 4.6 06/28/2021    CHLORIDE 110 03/15/2022    CHLORIDE 107 06/28/2021    CO2 24 03/15/2022    CO2 25 06/28/2021    BUN 46 03/15/2022    BUN 33 06/28/2021    CR 1.72 03/15/2022    CR 1.62 06/28/2021       Lab Results   Component Value Date    BILITOTAL 0.4 02/03/2022    BILITOTAL 0.5 06/28/2021    ALT 20 02/03/2022    ALT 20 06/28/2021    AST 15 02/03/2022    AST 9 06/28/2021    ALKPHOS 99 02/03/2022    ALKPHOS 98 06/28/2021       Lab Results   Component Value Date    ALBUMIN 3.8 02/03/2022    ALBUMIN 4.0 06/28/2021    PROTTOTAL 6.8 02/03/2022    PROTTOTAL 7.4 06/28/2021        Lab Results   Component Value Date    WBC 2.2 02/03/2022    WBC 4.4 06/28/2021    HGB 9.7 03/16/2022    HGB 7.3 06/28/2021    MCV 98 02/03/2022    MCV 96 06/28/2021     02/03/2022     06/28/2021       Lab Results   Component Value Date    INR 1.13 01/11/2022    INR 1.09 01/24/2020       Radiology  Liver transplant doppler U/S Jan 2022 personally reviewed- normal liver    Assessment  57-year-old male with history of liver transplant in 2019 for HCC complicated with CKD who presents for routine follow-up.  Liver function tests normal on current immunosuppression.  Renal function stable on calcineurin inhibitor-free immunosuppression regimen.  No evidence of recurrent HCC on post-transplant surveillance imaging.  Abdominal pain is musculoskeletal based on physical examination.  Will need dermatology referral for skin check but otherwise up to date with routine health maintenance.    Plan  1.  Liver transplant  - continue MMF 500mg " Q12  - continue pred 5mg PO Q24    2.  CKD  - follow-up with nephrology    3.  Hx HCC  - imaging per oncology    4.  Health care maintenance  - dermatology referral   - DEXA scan    Follow-up in 12 months    Santi Banuelos MD   Hepatology  [facility]    I spent 40 minutes on the date of the encounter doing chart review, history and exam, documentation and further activities as noted above.

## 2022-03-22 ENCOUNTER — VIRTUAL VISIT (OUTPATIENT)
Dept: BEHAVIORAL HEALTH | Facility: CLINIC | Age: 57
End: 2022-03-22
Payer: MEDICARE

## 2022-03-22 DIAGNOSIS — F31.31 BIPOLAR 1 DISORDER, DEPRESSED, MILD (H): Primary | ICD-10-CM

## 2022-03-22 DIAGNOSIS — F41.1 GENERALIZED ANXIETY DISORDER: ICD-10-CM

## 2022-03-22 PROCEDURE — 90832 PSYTX W PT 30 MINUTES: CPT | Mod: 95 | Performed by: PSYCHOLOGIST

## 2022-03-22 ASSESSMENT — PATIENT HEALTH QUESTIONNAIRE - PHQ9: SUM OF ALL RESPONSES TO PHQ QUESTIONS 1-9: 6

## 2022-03-22 NOTE — PROGRESS NOTES
[facility]: Integrated Behavioral Health  March 22, 2022      Behavioral Health Clinician Progress Note    Patient Name: Frandy Workman           Service Type: Video visit      Service Location:  Video visit      Session Start Time: 2:04  Session End Time:  2:34      Session Length: 16 - 37      Attendees: Patient    Visit Activities (Refresh list every visit): [facility] [place] Only     Service Modality:  Video Visit:      Provider verified identity through the following two step process.  Patient provided:  Patient photo and Patient is known previously to provider    Telemedicine Visit: The patient's condition can be safely assessed and treated via synchronous audio and visual telemedicine encounter.      Reason for Telemedicine Visit: Patient has requested telehealth visit and Services only offered telehealth    Originating Site (Patient Location): Patient's home    Distant Site (Provider Location): Provider Remote Setting- Home Office    Consent:  The patient/guardian has verbally consented to: the potential risks and benefits of telemedicine (video visit) versus in person care; bill my insurance or make self-payment for services provided; and responsibility for payment of non-covered services.     Patient would like the video invitation sent by:  Send to e-mail at: [email or web address]    Mode of Communication:  Video Conference via [facility]    As the provider I attest to compliance with applicable laws and regulations related to telemedicine.      Diagnostic Assessment Date:  12/03/2020  Treatment Plan Review Date:  6/1/2022  See Flowsheets for today's PHQ-9 and LIZZETTE-7 results  Previous PHQ-9:   PHQ-9 SCORE 1/4/2022 2/8/2022 3/21/2022   PHQ-9 Total Score MyChart 4 (Minimal depression) 4 (Minimal depression) 6 (Mild depression)   PHQ-9 Total Score 4 4 6     Previous LIZZETTE-7:   LIZZETTE-7 SCORE 12/29/2020 4/7/2021 9/30/2021   Total Score 8 (mild anxiety) 9 (mild anxiety) -   Total Score 8 9 10        Extended Session (60+ minutes): No  Interactive Complexity: No  Crisis: No    Treatment Objective(s) Addressed in This Session:  Target Behavior(s): disease management/lifestyle changes  related to adaptive approaches to managing anxious distress and mood difficulties    Increase interest and engagement in doing enjoyable/mastery activities    Current Stressors / Issues:  [facility] met with Miller today for a follow-up. Brief session. Reports making progress with his health. Had a recent physical exam and states his various lab tests are looking better overall. States he still feels exhausted, tired a good deal of the time, though he found our previous conversation about investing a little energy/time into behaviors that affect his emotional health valuable, such as moving more, getting organized, going outside for fresh air, etc. Reports he was able to complete his taxes with this approach, which he states has helped give him relief. Explored how he plans to continue this approach moving forward. Discussed his interest in moving more and we identified the idea of him trying to stand up and stretch once an hour to facilitate more movement in his routine. We also explored ways to increase his number of weekly social contacts, as this can be important for maintaining good emotional health. Affirmed his ideas and steps taken toward change.     Answers for HPI/ROS submitted by the patient on 3/21/2022  If you checked off any problems, how difficult have these problems made it for you to do your work, take care of things at home, or get along with other people?: Not difficult at all  PHQ9 TOTAL SCORE: 6      Progress on Treatment Objective(s) / Homework:  Stable - MAINTENANCE (Working to maintain change, with risk of relapse); Intervened by continuing to positively reinforce healthy behavior choice       Solution-Focused Therapy    Explore patterns in patient's relationships and discuss options for new behaviors.    Explore  patterns in patient's actions and choices and discuss options for new behaviors.    Supportive Psychotherapy      Answers for HPI/ROS submitted by the patient on 2/8/2022  If you checked off any problems, how difficult have these problems made it for you to do your work, take care of things at home, or get along with other people?: Somewhat difficult  PHQ9 TOTAL SCORE: 4      Answers for HPI/ROS submitted by the patient on 4/7/2021   LIZZETTE 7 TOTAL SCORE: 9  If you checked off any problems, how difficult have these problems made it for you to do your work, take care of things at home, or get along with other people?: Very difficult  PHQ9 TOTAL SCORE: 7*    *No SI    Answers for HPI/ROS submitted by the patient on 10/13/2020   If you checked off any problems, how difficult have these problems made it for you to do your work, take care of things at home, or get along with other people?: Somewhat difficult  PHQ9 TOTAL SCORE: 8*  LIZZETTE 7 TOTAL SCORE: 6      *No SI     Answers for HPI/ROS submitted by the patient on 12/29/2020   If you checked off any problems, how difficult have these problems made it for you to do your work, take care of things at home, or get along with other people?: Somewhat difficult  PHQ9 TOTAL SCORE: 5*  LIZZETTE 7 TOTAL SCORE: 8    *No SI       Care Plan review completed: yes    Medication Review:  No changes to current psychiatric medication(s)     Current Outpatient Medications   Medication     Acetaminophen (TYLENOL) 325 MG CAPS     ARIPiprazole (ABILIFY) 5 MG tablet     aspirin (SM ASPIRIN ADULT LOW STRENGTH) 81 MG EC tablet     BD VIKTORIA U/F 32G X 4 MM insulin pen needle     ciclopirox (LOPROX) 0.77 % cream     Continuous Blood Gluc  (FREESTYLE CLAUDIA 14 DAY READER) CECILIO     Continuous Blood Gluc Sensor (FREESTYLE CLAUDIA 2 SENSOR) MISC     cyanocobalamin (VITAMIN B-12) 1000 MCG tablet     empagliflozin (JARDIANCE) 10 MG TABS tablet     insulin aspart (NOVOLOG PEN) 100 UNIT/ML pen     insulin  degludec (TRESIBA FLEXTOUCH) 100 UNIT/ML pen     lamiVUDine (EPIVIR) 100 MG tablet     lisinopril (ZESTRIL) 5 MG tablet     methocarbamol (ROBAXIN) 750 MG tablet     metoprolol succinate ER (TOPROL-XL) 25 MG 24 hr tablet     Multiple Vitamin (TAB-A-GLADIS) TABS     mycophenolate (GENERIC EQUIVALENT) 250 MG capsule     order for DME     pantoprazole (PROTONIX) 40 MG EC tablet     polyethylene glycol (MIRALAX) 17 g packet     predniSONE (DELTASONE) 5 MG tablet     rosuvastatin (CRESTOR) 5 MG tablet     senna-docusate (SENOKOT-S/PERICOLACE) 8.6-50 MG tablet     tamsulosin (FLOMAX) 0.4 MG capsule     vitamin D3 (CHOLECALCIFEROL) 50 mcg (2000 units) tablet     Current Facility-Administered Medications   Medication     aflibercept (EYLEA) injection prefilled syringe 2 mg     aflibercept (EYLEA) injection prefilled syringe 2 mg     aflibercept (EYLEA) injection prefilled syringe 2 mg         Medication Compliance:  Yes    Changes in Health Issues:   None reported    Chemical Use Review:   Substance Use: Chemical use reviewed, no active concerns identified      Tobacco Use: No current tobacco use.         Assessment: Current Emotional / Mental Status (status of significant symptoms):  Risk status (Self / Other harm or suicidal ideation)  Patient denies a history of suicidal ideation, suicide attempts, self-injurious behavior, homicidal ideation, homicidal behavior and and other safety concerns     Patient denies current fears or concerns for personal safety.  Patient denies current or recent suicidal ideation or behaviors.  Patient denies current or recent homicidal ideation or behaviors.  Patient denies current or recent self injurious behavior or ideation.  Patient denies other safety concerns.     A safety and risk management plan has not been developed at this time, however patient was encouraged to call [place] [phone] should there be a change in any of these risk factors.    [place] Suicide Severity Rating Scale  (Short Version)  [place] Suicide Severity Rating (Short Version) 11/10/2019 12/2/2019 12/10/2019 6/11/2020 7/1/2020 7/12/2021 1/10/2022   Over the past 2 weeks have you felt down, depressed, or hopeless? no yes yes no no no no   Over the past 2 weeks have you had thoughts of killing yourself? no yes no no no no no   Comments - lots of stressors, has thought about not taking meds - - - - -   Have you ever attempted to kill yourself? no no no no no no no   Q1 Wished to be Dead (Past Month) - other (see comments) no - - - -   Comments - why did i do this surgery - - - - -   Q2 Suicidal Thoughts (Past Month) - no no - - - -   Comments - wishes he wouldn't have gone through surgery - - - - -   Q3 Suicidal Thought Method - no no - - - -   Q4 Suicidal Intent without Specific Plan - no no - - - -   Q5 Suicide Intent with Specific Plan - no no - - - -   Q6 Suicide Behavior (Lifetime) - no no - - - -         Appearance:   Appropriate   Eye Contact:   Good   Psychomotor Behavior: Restless   Attitude:   Cooperative  Interested Friendly Pleasant  Orientation:   All  Speech   Rate / Production: Normal/ Responsive   Volume:  Normal   Mood:    Depressed   Affect:    Appropriate    Thought Content:  Clear   Thought Form:  Goal Directed  Logical   Insight:    Good     Diagnoses:  1. Bipolar 1 disorder, depressed, mild (H)    2. Generalized anxiety disorder          Collateral Reports Completed:  Not Applicable    Plan: (Homework, other):  Continue with this writer for individual psychotherapy in three weeks. He will continue to work on managing depression and anxiety through achievable behavioral activation ideas presented today. No CD issues.     Joseph Murillo, RED  March 22, 2022            ______________________________________________________________________                                            Individual Treatment Plan    Patient's Name: Frandy Workman  YOB: 1965    Date of Creation: 3/1/2022  Date  Treatment Plan Last Reviewed/Revised: 3/1/2022  DSM5 Diagnoses: 296.51 Bipolar I Disorder Current or Most Recent Episode Depressed, Mild or 300.02 (F41.1) Generalized Anxiety Disorder  Psychosocial / Contextual Factors: Post Solid Organ Tx  PROMIS (reviewed every 90 days): 4    Referral / Collaboration:  Referral to another professional/service is not indicated at this time..    Anticipated number of session for this episode of care: 10+  Anticipation frequency of session: Every other week  Anticipated Duration of each session: 38-52 minutes  Treatment plan will be reviewed in 90 days or when goals have been changed.       MeasurableTreatment Goal(s) related to diagnosis / functional impairment(s)  Goal 1: Patient will experience a reduction in depressive symptoms along with a corresponding increase in positive emotion and life satisfaction.      Objective #A (Patient Action)    Patient will Increase interest, engagement, and pleasure in doing things.  Status: Continued - Date(s): March 1, 2022    Intervention(s)  Therapist will help patient identify pleasant and mastery oriented events that elicit positive, relaxed mood.    Objective #B  Patient will Decrease frequency and intensity of feeling down, depressed, hopeless.  Status: Continued - Date(s): March 1, 2022    Intervention(s)  Therapist will introduce patient to cognitive-behavioral and acceptance and commitment therapy topics aimed to help reduce depression and anxiety    Objective #C  Patient will Identify negative self-talk and behaviors: challenge core beliefs, myths, and actions  Improve concentration, focus, and mindfulness in daily activities .  Status: Continued - Date(s): March 1, 2022    Intervention(s)  Therapist will help patient identify and manage negative self-talk and automatic thoughts; introduce patient to cognitive distortions; help patient develop cognitive diffusion techniques      Goal 2: Patient will experience a reduction in anxious  "symptoms, along with a corresponding increase in relaxed emotional states and life satisfaction.      Objective #A (Patient Action)  Patient will use cognitive-behavioral and thought diffusion strategies identified in therapy to challenge anxious thoughts.    Status: Continued - Date(s): March 1, 2022    Intervention(s)  Therapist will utilize CBT and ACT ideas to help patient challenge anxious thoughts and reduce intensity/duration of anxious distress    Objective #B  Patient will use \"worry time\" each day for 15 minutes of scheduled worry and then defer obsessive or anxious thinking until the next structured worry time.    Status: Continued - Date(s): March 1, 2022    Intervention(s)  Therapist will teach patient how to effectively utilize worry time and/or thought logs/journals each day and incorporate more relaxation behaviors into their routine.    Objective #C  Patient will identify the stressors which contribute to feelings of anxiety  Patient will increase engagement in adaptive coping skills and recreational activities, such as exercise and healthy socialization, to manage distress.  Status: Continued - Date(s): March 1, 2022    Intervention(s)  Therapist will help patient identify triggers/situational factors that contribute to anxiety and behavioral skills aimed to manage anxious distress.      Goal 3: Patiient will work toward adaptively managing bipolar-related depression and manic episodes      Objective #A (Patient Action)    Status: Continued - Date(s): March 1, 2022    Patient will identify 2-3 signs or signals of emerging mood instability.    Intervention(s)  Therapist will provide educational materials on bipolar disorder and help identifying triggers for mood instability.    Objective #B  Patient will identify 2-3 strategies for more effectively managing Bipolar Disorder.    Status: Continued - Date(s): March 1, 2022    Intervention(s)  Therapist will teach emotional recognition/identification. " Skills aimed to effectively manage bipolar disorder.      Other Possible Therapeutic Intervention(s):    Psycho-education regarding mental health diagnoses and treatment options    Supportive Therapy    Provide affirmations, reflections, and establish working rapport    Emphasize and reflect on strength of therapeutic relationship    Skills training    Explore skills useful to client in current situation.    Skills include assertiveness, communication, conflict management, problem-solving, relaxation, etc.    Solution-Focused Therapy    Explore patterns in patient's relationships and discuss options for new behaviors.    Explore patterns in patient's actions and choices and discuss options for new behaviors.    Cognitive-behavioral Therapy    Discuss common cognitive distortions, identify them in patient's life.    Explore ways to challenge, replace, and act against these cognitions.    Acceptance and Commitment Therapy    Explore and identify important values in patient's life.    Discuss ways to commit to behavioral activation around these values.    Psychodynamic psychotherapy    Discuss patient's emotional dynamics and issues and how they impact behaviors.    Explore patient's history of relationships and how they impact present behaviors.    Explore how to work with and make changes in these schemas and patterns.    Narrative Therapy    Explore the patient's story of his/her life from his/her perspective.    Explore alternate ways of understanding their experience, identifying exceptions, developing new themes.    Interpersonal Psychotherapy    Explore patterns in relationships that are effective or ineffective at helping patient reach their goals, find satisfying experience.    Discuss new patterns or behaviors to engage in for improved social functioning.    Behavioral Activation    Discuss steps patient can take to become more involved in meaningful activity.    Identify barriers to these activities and  explore possible solutions.    Mindfulness-Based Strategies    Discuss skills based on development and application of mindfulness.    Skills drawn from compassion-focused therapy, dialectical behavior therapy, mindfulness-based stress reduction, mindfulness-based cognitive therapy, etc.      Patient has reviewed and agreed to the above plan.      Joseph Murillo Psy.D,    Behavioral Health Clinician   [facility]     March 1, 2022

## 2022-03-23 ENCOUNTER — ALLIED HEALTH/NURSE VISIT (OUTPATIENT)
Dept: TRANSPLANT | Facility: CLINIC | Age: 57
End: 2022-03-23
Attending: INTERNAL MEDICINE
Payer: MEDICARE

## 2022-03-23 ENCOUNTER — LAB (OUTPATIENT)
Dept: LAB | Facility: CLINIC | Age: 57
End: 2022-03-23
Attending: INTERNAL MEDICINE
Payer: MEDICARE

## 2022-03-23 ENCOUNTER — TELEPHONE (OUTPATIENT)
Dept: PHARMACY | Facility: CLINIC | Age: 57
End: 2022-03-23

## 2022-03-23 DIAGNOSIS — D63.1 ANEMIA OF CHRONIC RENAL FAILURE, STAGE 3B (H): ICD-10-CM

## 2022-03-23 DIAGNOSIS — D63.1 ANEMIA OF CHRONIC RENAL FAILURE, STAGE 3B (H): Primary | ICD-10-CM

## 2022-03-23 DIAGNOSIS — N18.32 ANEMIA OF CHRONIC RENAL FAILURE, STAGE 3B (H): ICD-10-CM

## 2022-03-23 DIAGNOSIS — N18.32 STAGE 3B CHRONIC KIDNEY DISEASE (H): ICD-10-CM

## 2022-03-23 DIAGNOSIS — N18.32 ANEMIA OF CHRONIC RENAL FAILURE, STAGE 3B (H): Primary | ICD-10-CM

## 2022-03-23 LAB
HCT VFR BLD AUTO: 34.1 % (ref 40–53)
HGB BLD-MCNC: 10.1 G/DL (ref 13.3–17.7)

## 2022-03-23 PROCEDURE — 85018 HEMOGLOBIN: CPT | Performed by: PATHOLOGY

## 2022-03-23 PROCEDURE — 85014 HEMATOCRIT: CPT | Performed by: PATHOLOGY

## 2022-03-23 PROCEDURE — 36415 COLL VENOUS BLD VENIPUNCTURE: CPT | Performed by: PATHOLOGY

## 2022-03-23 NOTE — TELEPHONE ENCOUNTER
Anemia Management Note  SUBJECTIVE/OBJECTIVE:  Referred by Dr. Eloy Rao on 2021  Primary Diagnosis: Anemia in Chronic Kidney Disease (N18.3, D63.1)   3b  Secondary Diagnosis:  Chronic Kidney Disease, Stage 3 (N18.3)  3b  Liver Tx: 2019  Hgb goal range:  9-10  Epo/Darbo: Aranesp 100mcg every 7 days for Hgb <10.  In Clinic.  *22: dose changed to weekly per Dr. Rao  *dose increased to 100mcg starting 22  *dose increased to 60mcg starting with  21 dose  Iron regimen:  NA.  Iron levels stable  Labs : 2022  Recent IVELISSE use, transfusion, IV iron: Aranesp   RX/TX plans :  6/10/2022     No history of stroke, MI and blood clots.  History of hepatocellular carcinoma - s/p TACE 2019 and liver transplant 2019.     Contact:            Ok to leave message regarding scheduling, medical, and billing per consent to communicate dated 10/2/19                              OK to speak with Davi HurstSaunders (friend/POA) regarding scheduling, medical, and billing per consent to communicate dated 10/2/19    Anemia Latest Ref Rng & Units 2022 2022 2022 3/2/2022 3/9/2022 3/16/2022 3/23/2022   IVELISSE Dose - 100 mcg 100 mcg 100 mcg 100 mcg 100 mcg 100 mcg -   Hemoglobin 13.3 - 17.7 g/dL 7.9(L) 7.9(L) 9.0(L) 8.6(L) 9.5(L) 9.7(L) 10.1(L)   TSAT 15 - 46 % 39 - - - - - -   Ferritin 26 - 388 ng/mL 1,046(H) - - - - - -   PRBCs - - - - - - - -     BP Readings from Last 3 Encounters:   22 99/64   22 99/58   03/15/22 100/62     Wt Readings from Last 2 Encounters:   22 168 lb 12.8 oz (76.6 kg)   03/15/22 159 lb 4.8 oz (72.3 kg)           ASSESSMENT:  Hgb:Above goal - recommend hold dose  TSat: at goal >30% Ferritin: Elevated (>1000ng/mL)    PLAN:  Hold Aranesp and RTC for hgb then aranesp if needed in 1 week(s)    Orders needed to be renewed (for next follow-up date) in EPIC: None    Iron labs due:  May 2022    Plan discussed with:  No call, chart review      NEXT  FOLLOW-UP DATE:  3/30/22 9am appt    Jie Blum RN   [facility]  [address] [address]   [email or web address]   Office : [phone]  Fax: [phone]

## 2022-03-23 NOTE — PROGRESS NOTES
Chief Complaint   Patient presents with     Allied Health Visit     Aranesp Inj     No Aranesp needed today. Hbg was 10.1 per therapy plan <10.    Nereida Steiner, CMA

## 2022-03-28 DIAGNOSIS — Z95.1 S/P CABG (CORONARY ARTERY BYPASS GRAFT): ICD-10-CM

## 2022-03-29 ENCOUNTER — VIRTUAL VISIT (OUTPATIENT)
Dept: ONCOLOGY | Facility: CLINIC | Age: 57
End: 2022-03-29
Attending: INTERNAL MEDICINE
Payer: MEDICARE

## 2022-03-29 DIAGNOSIS — Z94.4 LIVER TRANSPLANT RECIPIENT (H): ICD-10-CM

## 2022-03-29 DIAGNOSIS — N18.31 STAGE 3A CHRONIC KIDNEY DISEASE (H): ICD-10-CM

## 2022-03-29 DIAGNOSIS — C22.0 HCC (HEPATOCELLULAR CARCINOMA) (H): Primary | ICD-10-CM

## 2022-03-29 PROCEDURE — 99213 OFFICE O/P EST LOW 20 MIN: CPT | Mod: 95 | Performed by: NURSE PRACTITIONER

## 2022-03-29 PROCEDURE — G0463 HOSPITAL OUTPT CLINIC VISIT: HCPCS | Mod: PN,RTG | Performed by: NURSE PRACTITIONER

## 2022-03-29 NOTE — NURSING NOTE
Patient confirms medications and allergies are accurate via patients echeck in completion, and or denies any changes since last reviewed/verified.     Michael Pickard, Virtual Facilitator

## 2022-03-29 NOTE — LETTER
3/29/2022         RE: Frandy Workman  [address] [facility] [address]        Dear Colleague,    Thank you for referring your patient, Frandy Workman, to the [facility]. Please see a copy of my visit note below.      Reason for Visit: seen in f/u of HCC    Oncology HPI: Miller Workman is a 57 year old man with a PMH of DMII, bipolar disorder, cirrhosis s/t ESTRADA with subsequent development of HCC, s/p liver transplant in Jan 2021, HLD, HTN, GERD, BPH and CAD with hx of 2 vessel CABG in July 2021, CKD with anemia of chronic disease, on erythropoetin.     He as diagnosed with hepatocellular carcinoma in December 2018 when he was found to have 2 LIRADS 5 lesions on surveillance imaging. He underwent TACE on 1/22/19.  He is liver transplant on 11/11/2019. The explanted liver had 2 lesions that were mostly necrotic and less than 3 cm with no clearly identifiable vascular invasion.  He is here for routine surveillance today.    Interval history:   Miller is doing well today. He was hospitalized in January 2022 with dyspnea, chest pain, cough, acute anemia and masha.  His renal function improved with hydration and anemia improved with use of erythropoetin.  No cardiac or infectious cause identified for the symptoms. Last summer he did have a MI and underwent 2 vessel CABG in July 2021.    He has mild sternal discomfort with deep breathing, at the site of his incision. No abdominal pain, bloating. No N/V. Appetite is good. Weight stable. Hemoglobin is improving with use of aranesp.      Mood is stable. He has regular follow-up with psychiatry.   He also is followed regularly by SOT and cardiology.    He denies any new specific concerns today.    Current Outpatient Medications   Medication Sig Dispense Refill     Acetaminophen (TYLENOL) 325 MG CAPS Take 325-650 mg by mouth every 4 hours as needed (pain, fever) 100 capsule 1     ARIPiprazole (ABILIFY) 5 MG tablet daily       aspirin (SM  ASPIRIN ADULT LOW STRENGTH) 81 MG EC tablet Take 2 tablets (162 mg) by mouth daily 180 tablet 1     BD VIKTORIA U/F 32G X 4 MM insulin pen needle Use 5 per day 300 each 3     ciclopirox (LOPROX) 0.77 % cream Apply topically 2 times daily To feet and toenails. 90 g 5     Continuous Blood Gluc  (FREESTYLE CLAUDIA 14 DAY READER) CECILIO Use to read blood sugars as per 's instructions. 1 each 0     Continuous Blood Gluc Sensor (FREESTYLE CLAUDIA 2 SENSOR) [facility] 1 each See Admin Instructions Change every 14 days. 2 each 5     cyanocobalamin (VITAMIN B-12) 1000 MCG tablet TAKE 1 TABLET (1,000 MCG) BY MOUTH DAILY 30 tablet 11     empagliflozin (JARDIANCE) 10 MG TABS tablet Take 1 tablet (10 mg) by mouth daily 30 tablet 11     insulin aspart (NOVOLOG PEN) 100 UNIT/ML pen Inject 2-7 Units Subcutaneous 4 times daily (with meals and nightly) 1unit : 20 g carb before meals.  Also add 1 unit : 50 mg/dl >180 before meals and at bedtime. 15 mL 3     insulin degludec (TRESIBA FLEXTOUCH) 100 UNIT/ML pen Inject 26 units subcutaneous once daily 25 mL 3     lamiVUDine (EPIVIR) 100 MG tablet Take 1 tablet (100 mg) by mouth daily 90 tablet 3     lisinopril (ZESTRIL) 5 MG tablet Take 1 tablet (5 mg) by mouth daily 30 tablet 11     methocarbamol (ROBAXIN) 750 MG tablet Take 1 tablet (750 mg) by mouth 3 times daily as needed for muscle spasms (sternal pain) 60 tablet 0     metoprolol succinate ER (TOPROL-XL) 25 MG 24 hr tablet Take 0.5 tablets (12.5 mg) by mouth daily 60 tablet 3     Multiple Vitamin (TAB-A-GLADIS) TABS Take 1 tablet by mouth daily 90 tablet 3     mycophenolate (GENERIC EQUIVALENT) 250 MG capsule Take 2 capsules (500 mg) by mouth every 12 hours 120 capsule 11     order for DME 1 Device by Device route daily Knee high compression socks 15-20 mmhg. 1 Device 0     pantoprazole (PROTONIX) 40 MG EC tablet Take 1 tablet (40 mg) by mouth daily before breakfast 90 tablet 3     polyethylene glycol (MIRALAX) 17 g packet Take 1  packet by mouth daily       predniSONE (DELTASONE) 5 MG tablet TAKE ONE TABLET BY MOUTH ONCE DAILY 90 tablet 3     rosuvastatin (CRESTOR) 5 MG tablet Take 1 tablet (5 mg) by mouth daily 90 tablet 3     senna-docusate (SENOKOT-S/PERICOLACE) 8.6-50 MG tablet Take 1 tablet by mouth 2 times daily as needed for constipation 50 tablet 0     tamsulosin (FLOMAX) 0.4 MG capsule Take 1 capsule (0.4 mg) by mouth daily 90 capsule 3     vitamin D3 (CHOLECALCIFEROL) 50 mcg (2000 units) tablet Take 1 tablet (50 mcg) by mouth daily 90 tablet 3          Allergies   Allergen Reactions     Codeine Other (See Comments)     Cannot take due to liver  Cannot tolerate oral narcotics     Seasonal Allergies      Sneezing, coughing, runny and itchy eyes       Video physical exam  General: Patient appears well in no acute distress.   Skin: No visualized rash or lesions on visualized skin  Eyes: EOMI, no erythema, sclera icterus or discharge noted  Resp: Appears to be breathing comfortably without accessory muscle usage, speaking in full sentences, no cough  MSK: Appears to have normal range of motion based on visualized movements  Neurologic: No apparent tremors, facial movements symmetric  Psych: affect engaged, pleasant, alert and oriented    The rest of a comprehensive physical examination is deferred due to PHE (public health emergency) video restrictions     There were no vitals taken for this visit.  Wt Readings from Last 4 Encounters:   03/21/22 76.6 kg (168 lb 12.8 oz)   03/15/22 72.3 kg (159 lb 4.8 oz)   03/15/22 72.6 kg (160 lb)   01/13/22 69.9 kg (154 lb)         Labs: Results for MAGDY SANDERS (MRN [id number]) as of 3/29/2022 10:18   Ref. Range 3/15/2022 10:22 3/16/2022 08:28 3/23/2022 08:26   Sodium Latest Ref Range: 133 - 144 mmol/L 140     Potassium Latest Ref Range: 3.4 - 5.3 mmol/L 5.3     Chloride Latest Ref Range: 94 - 109 mmol/L 110 (H)     Carbon Dioxide Latest Ref Range: 20 - 32 mmol/L 24     Urea Nitrogen Latest Ref  Range: 7 - 30 mg/dL 46 (H)     Creatinine Latest Ref Range: 0.66 - 1.25 mg/dL 1.72 (H)     GFR Estimate Latest Ref Range: >60 mL/min/1.73m2 46 (L)     Calcium Latest Ref Range: 8.5 - 10.1 mg/dL 9.9     Anion Gap Latest Ref Range: 3 - 14 mmol/L 6     Glucose Latest Ref Range: 70 - 99 mg/dL 139 (H)     Hemoglobin Latest Ref Range: 13.3 - 17.7 g/dL  9.7 (L) 10.1 (L)   Hematocrit Latest Ref Range: 40.0 - 53.0 %  32.6 (L) 34.1 (L)   Results for MAGDY SANDERS (MRN [id number]) as of 3/29/2022 10:18   Ref. Range 3/26/2021 08:26 9/24/2021 10:00 11/26/2021 07:47   Alpha Fetoprotein Latest Ref Range: 0.0 - 8.0 ug/L <1.5 <1.5 <1.5       Imaging: EXAMINATION: CT CHEST/ABDOMEN/PELVIS W CONTRAST, 11/26/2021 8:50 AM     TECHNIQUE:  Helical CT images from the lung apices through the  symphysis pubis were obtained with contrast.  Coronal and sagittal  reformatted images were generated at a workstation for further  assessment.     CONTRAST:  100 ml Isovue 370.     COMPARISON: CT chest abdomen pelvis 9/24/2021     HISTORY: HCC (hepatocellular carcinoma) (H)     FINDINGS:     Lines and tubes: None.     Mediastinum/Neck Base: No thyroid nodules. Central tracheobronchial  tree is patent. Heart size is normal. No pericardial effusion.  Normal  thoracic vasculature. 7 mm right paratracheal lymph node, unchanged  (series 10, image 76). No significant lymphadenopathy by size criteria      Lungs: No consolidation. No pleural effusion or pneumothorax.  Unchanged 4 mm nodule in the right upper lobe (series 17 image 27).  Unchanged 3 mm subpleural nodule in the left upper lobe (series 17  image 121). Unchanged bibasilar atelectasis. Rounded  density/atelectasis within the right lower lobe (series 17, image  235), unchanged. Periesophageal varices along the distal esophagus.     Liver: Postsurgical changes of liver transplant. Mild heterogeneous  enhancement with subtle ill marginated increased arterial phase  enhancement along the falciform  ligament. No delayed washout. No  suspicious focal liver lesions. Portal veins appear patent.   Gallbladder: Surgically absent.  Spleen: Enlarged measuring 13.3 cm.  Pancreas: No suspicious pancreatic lesions. The pancreatic duct is not  dilated.  Adrenal glands: No adrenal nodules.   Kidneys: No hydronephrosis or obstructing renal stones.  Unchanged  left renal hypodensity likely representing cysts (series 10, image  339. Persistent nonspecific mild perinephric stranding.  Bladder / Pelvic organs: Mild enlarged prostate measuring 4.8 cm.  Partially distended and within normal limits  Bowel: No bowel wall thickening. The appendix is unremarkable.  Lymph nodes: No retroperitoneal, mesenteric, or pelvic  lymphadenopathy.  Peritoneum / Retroperitoneum: No free fluid or air within the abdomen.  Vessels: No infrarenal aortic aneurysm. Hepatic artery arising from  SMA. 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     Bones and soft tissues: Degenerative changes of the spine. Small  periumbilical fat-containing hernia.                                                                      IMPRESSION: In this patient with history of hepatocellular carcinoma,  the current scan compared to CT chest, abdomen and pelvis 9/24/2021  shows:  1. Postsurgical changes of liver transplant. No suspicious focal liver  lesion.  2. Chronic portal hypertension sequale with mild splenomegaly,  esophageal varices and venous collaterals.  3. Multiple subcentimeter pulmonary nodules, unchanged. No new or  enlarging pulmonary nodule.     GUSTAVO SAMAYOA MD         Impression/plan:   HCC, s/p liver transplantation on 1/11/2019  -I reviewed his imaging and labs from November 2021. It's not clear to me or him why he was scheduled only 2 months after his prior scan. There isn't a specific finding that raises concern for recurrence at this time. He would be due for a CT and AFP at 6 month interval. I will request this in 2  months time to get him back on the 6 month interval surveillance schedule.   I will see him at that time.    He will continue to f/u with his SOT, cardiology, nephrology and psychiatry      Again, thank you for allowing me to participate in the care of your patient.        Sincerely,        SHANIQUE Liz CNP

## 2022-03-29 NOTE — PROGRESS NOTES
Miller is a 57 year old who is being evaluated via a billable video visit.      How would you like to obtain your AVS? Urban Matrixhart  If the video visit is dropped, the invitation should be resent by: Text to cell phone: [phone]   Will anyone else be joining your video visit? No      Video Start Time: 930  Video-Visit Details    Type of service:  Video Visit    Video End Time:9:41 AM    Originating Location (pt. Location): Home    Distant Location (provider location):  [facility]     Platform used for Video Visit: Odette Pickard    Reason for Visit: seen in f/u of HCC    Oncology HPI: Miller Workman is a 57 year old man with a PMH of DMII, bipolar disorder, cirrhosis s/t ESTRADA with subsequent development of HCC, s/p liver transplant in Jan 2021, HLD, HTN, GERD, BPH and CAD with hx of 2 vessel CABG in July 2021, CKD with anemia of chronic disease, on erythropoetin.     He as diagnosed with hepatocellular carcinoma in December 2018 when he was found to have 2 LIRADS 5 lesions on surveillance imaging. He underwent TACE on 1/22/19.  He is liver transplant on 11/11/2019. The explanted liver had 2 lesions that were mostly necrotic and less than 3 cm with no clearly identifiable vascular invasion.  He is here for routine surveillance today.    Interval history:   Miller is doing well today. He was hospitalized in January 2022 with dyspnea, chest pain, cough, acute anemia and masha.  His renal function improved with hydration and anemia improved with use of erythropoetin.  No cardiac or infectious cause identified for the symptoms. Last summer he did have a MI and underwent 2 vessel CABG in July 2021.    He has mild sternal discomfort with deep breathing, at the site of his incision. No abdominal pain, bloating. No N/V. Appetite is good. Weight stable. Hemoglobin is improving with use of aranesp.      Mood is stable. He has regular follow-up with psychiatry.   He also is followed regularly by SOT and  cardiology.    He denies any new specific concerns today.    Current Outpatient Medications   Medication Sig Dispense Refill     Acetaminophen (TYLENOL) 325 MG CAPS Take 325-650 mg by mouth every 4 hours as needed (pain, fever) 100 capsule 1     ARIPiprazole (ABILIFY) 5 MG tablet daily       aspirin (SM ASPIRIN ADULT LOW STRENGTH) 81 MG EC tablet Take 2 tablets (162 mg) by mouth daily 180 tablet 1     BD VIKTORIA U/F 32G X 4 MM insulin pen needle Use 5 per day 300 each 3     ciclopirox (LOPROX) 0.77 % cream Apply topically 2 times daily To feet and toenails. 90 g 5     Continuous Blood Gluc  (FREESTYLE CLAUDIA 14 DAY READER) CECILIO Use to read blood sugars as per 's instructions. 1 each 0     Continuous Blood Gluc Sensor (FREESTYLE CLAUDIA 2 SENSOR) [facility] 1 each See Admin Instructions Change every 14 days. 2 each 5     cyanocobalamin (VITAMIN B-12) 1000 MCG tablet TAKE 1 TABLET (1,000 MCG) BY MOUTH DAILY 30 tablet 11     empagliflozin (JARDIANCE) 10 MG TABS tablet Take 1 tablet (10 mg) by mouth daily 30 tablet 11     insulin aspart (NOVOLOG PEN) 100 UNIT/ML pen Inject 2-7 Units Subcutaneous 4 times daily (with meals and nightly) 1unit : 20 g carb before meals.  Also add 1 unit : 50 mg/dl >180 before meals and at bedtime. 15 mL 3     insulin degludec (TRESIBA FLEXTOUCH) 100 UNIT/ML pen Inject 26 units subcutaneous once daily 25 mL 3     lamiVUDine (EPIVIR) 100 MG tablet Take 1 tablet (100 mg) by mouth daily 90 tablet 3     lisinopril (ZESTRIL) 5 MG tablet Take 1 tablet (5 mg) by mouth daily 30 tablet 11     methocarbamol (ROBAXIN) 750 MG tablet Take 1 tablet (750 mg) by mouth 3 times daily as needed for muscle spasms (sternal pain) 60 tablet 0     metoprolol succinate ER (TOPROL-XL) 25 MG 24 hr tablet Take 0.5 tablets (12.5 mg) by mouth daily 60 tablet 3     Multiple Vitamin (TAB-A-GLADIS) TABS Take 1 tablet by mouth daily 90 tablet 3     mycophenolate (GENERIC EQUIVALENT) 250 MG capsule Take 2 capsules (500  mg) by mouth every 12 hours 120 capsule 11     order for DME 1 Device by Device route daily Knee high compression socks 15-20 mmhg. 1 Device 0     pantoprazole (PROTONIX) 40 MG EC tablet Take 1 tablet (40 mg) by mouth daily before breakfast 90 tablet 3     polyethylene glycol (MIRALAX) 17 g packet Take 1 packet by mouth daily       predniSONE (DELTASONE) 5 MG tablet TAKE ONE TABLET BY MOUTH ONCE DAILY 90 tablet 3     rosuvastatin (CRESTOR) 5 MG tablet Take 1 tablet (5 mg) by mouth daily 90 tablet 3     senna-docusate (SENOKOT-S/PERICOLACE) 8.6-50 MG tablet Take 1 tablet by mouth 2 times daily as needed for constipation 50 tablet 0     tamsulosin (FLOMAX) 0.4 MG capsule Take 1 capsule (0.4 mg) by mouth daily 90 capsule 3     vitamin D3 (CHOLECALCIFEROL) 50 mcg (2000 units) tablet Take 1 tablet (50 mcg) by mouth daily 90 tablet 3          Allergies   Allergen Reactions     Codeine Other (See Comments)     Cannot take due to liver  Cannot tolerate oral narcotics     Seasonal Allergies      Sneezing, coughing, runny and itchy eyes       Video physical exam  General: Patient appears well in no acute distress.   Skin: No visualized rash or lesions on visualized skin  Eyes: EOMI, no erythema, sclera icterus or discharge noted  Resp: Appears to be breathing comfortably without accessory muscle usage, speaking in full sentences, no cough  MSK: Appears to have normal range of motion based on visualized movements  Neurologic: No apparent tremors, facial movements symmetric  Psych: affect engaged, pleasant, alert and oriented    The rest of a comprehensive physical examination is deferred due to PHE (public health emergency) video restrictions     There were no vitals taken for this visit.  Wt Readings from Last 4 Encounters:   03/21/22 76.6 kg (168 lb 12.8 oz)   03/15/22 72.3 kg (159 lb 4.8 oz)   03/15/22 72.6 kg (160 lb)   01/13/22 69.9 kg (154 lb)         Labs: Results for MAGDY SANDERS OLIVER (MRN [id number]) as of 3/29/2022  10:18   Ref. Range 3/15/2022 10:22 3/16/2022 08:28 3/23/2022 08:26   Sodium Latest Ref Range: 133 - 144 mmol/L 140     Potassium Latest Ref Range: 3.4 - 5.3 mmol/L 5.3     Chloride Latest Ref Range: 94 - 109 mmol/L 110 (H)     Carbon Dioxide Latest Ref Range: 20 - 32 mmol/L 24     Urea Nitrogen Latest Ref Range: 7 - 30 mg/dL 46 (H)     Creatinine Latest Ref Range: 0.66 - 1.25 mg/dL 1.72 (H)     GFR Estimate Latest Ref Range: >60 mL/min/1.73m2 46 (L)     Calcium Latest Ref Range: 8.5 - 10.1 mg/dL 9.9     Anion Gap Latest Ref Range: 3 - 14 mmol/L 6     Glucose Latest Ref Range: 70 - 99 mg/dL 139 (H)     Hemoglobin Latest Ref Range: 13.3 - 17.7 g/dL  9.7 (L) 10.1 (L)   Hematocrit Latest Ref Range: 40.0 - 53.0 %  32.6 (L) 34.1 (L)   Results for MAGDY SANDERS (MRN [id number]) as of 3/29/2022 10:18   Ref. Range 3/26/2021 08:26 9/24/2021 10:00 11/26/2021 07:47   Alpha Fetoprotein Latest Ref Range: 0.0 - 8.0 ug/L <1.5 <1.5 <1.5       Imaging: EXAMINATION: CT CHEST/ABDOMEN/PELVIS W CONTRAST, 11/26/2021 8:50 AM     TECHNIQUE:  Helical CT images from the lung apices through the  symphysis pubis were obtained with contrast.  Coronal and sagittal  reformatted images were generated at a workstation for further  assessment.     CONTRAST:  100 ml Isovue 370.     COMPARISON: CT chest abdomen pelvis 9/24/2021     HISTORY: HCC (hepatocellular carcinoma) (H)     FINDINGS:     Lines and tubes: None.     Mediastinum/Neck Base: No thyroid nodules. Central tracheobronchial  tree is patent. Heart size is normal. No pericardial effusion.  Normal  thoracic vasculature. 7 mm right paratracheal lymph node, unchanged  (series 10, image 76). No significant lymphadenopathy by size criteria      Lungs: No consolidation. No pleural effusion or pneumothorax.  Unchanged 4 mm nodule in the right upper lobe (series 17 image 27).  Unchanged 3 mm subpleural nodule in the left upper lobe (series 17  image 121). Unchanged bibasilar atelectasis.  Rounded  density/atelectasis within the right lower lobe (series 17, image  235), unchanged. Periesophageal varices along the distal esophagus.     Liver: Postsurgical changes of liver transplant. Mild heterogeneous  enhancement with subtle ill marginated increased arterial phase  enhancement along the falciform ligament. No delayed washout. No  suspicious focal liver lesions. Portal veins appear patent.   Gallbladder: Surgically absent.  Spleen: Enlarged measuring 13.3 cm.  Pancreas: No suspicious pancreatic lesions. The pancreatic duct is not  dilated.  Adrenal glands: No adrenal nodules.   Kidneys: No hydronephrosis or obstructing renal stones.  Unchanged  left renal hypodensity likely representing cysts (series 10, image  339. Persistent nonspecific mild perinephric stranding.  Bladder / Pelvic organs: Mild enlarged prostate measuring 4.8 cm.  Partially distended and within normal limits  Bowel: No bowel wall thickening. The appendix is unremarkable.  Lymph nodes: No retroperitoneal, mesenteric, or pelvic  lymphadenopathy.  Peritoneum / Retroperitoneum: No free fluid or air within the abdomen.  Vessels: No infrarenal aortic aneurysm. Hepatic artery arising from  SMA. 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     Bones and soft tissues: Degenerative changes of the spine. Small  periumbilical fat-containing hernia.                                                                      IMPRESSION: In this patient with history of hepatocellular carcinoma,  the current scan compared to CT chest, abdomen and pelvis 9/24/2021  shows:  1. Postsurgical changes of liver transplant. No suspicious focal liver  lesion.  2. Chronic portal hypertension sequale with mild splenomegaly,  esophageal varices and venous collaterals.  3. Multiple subcentimeter pulmonary nodules, unchanged. No new or  enlarging pulmonary nodule.     GUSTAVO SAMAYOA MD             Impression/plan:   HCC, s/p liver  transplantation on 1/11/2019  -I reviewed his imaging and labs from November 2021. It's not clear to me or him why he was scheduled only 2 months after his prior scan. There isn't a specific finding that raises concern for recurrence at this time. He would be due for a CT and AFP at 6 month interval. I will request this in 2 months time to get him back on the 6 month interval surveillance schedule.   I will see him at that time.    He will continue to f/u with his SOT, cardiology, nephrology and psychiatry

## 2022-03-30 ENCOUNTER — TELEPHONE (OUTPATIENT)
Dept: PHARMACY | Facility: CLINIC | Age: 57
End: 2022-03-30

## 2022-03-30 ENCOUNTER — ALLIED HEALTH/NURSE VISIT (OUTPATIENT)
Dept: TRANSPLANT | Facility: CLINIC | Age: 57
End: 2022-03-30
Attending: INTERNAL MEDICINE
Payer: MEDICARE

## 2022-03-30 ENCOUNTER — LAB (OUTPATIENT)
Dept: LAB | Facility: CLINIC | Age: 57
End: 2022-03-30
Attending: INTERNAL MEDICINE
Payer: MEDICARE

## 2022-03-30 DIAGNOSIS — N18.4 ANEMIA DUE TO STAGE 4 CHRONIC KIDNEY DISEASE (H): Primary | ICD-10-CM

## 2022-03-30 DIAGNOSIS — N18.32 STAGE 3B CHRONIC KIDNEY DISEASE (H): ICD-10-CM

## 2022-03-30 DIAGNOSIS — N18.32 ANEMIA OF CHRONIC RENAL FAILURE, STAGE 3B (H): ICD-10-CM

## 2022-03-30 DIAGNOSIS — D63.1 ANEMIA DUE TO STAGE 4 CHRONIC KIDNEY DISEASE (H): Primary | ICD-10-CM

## 2022-03-30 DIAGNOSIS — D63.1 ANEMIA OF CHRONIC RENAL FAILURE, STAGE 3B (H): ICD-10-CM

## 2022-03-30 LAB
HCT VFR BLD AUTO: 36.6 % (ref 40–53)
HGB BLD-MCNC: 11 G/DL (ref 13.3–17.7)

## 2022-03-30 PROCEDURE — 85018 HEMOGLOBIN: CPT | Performed by: PATHOLOGY

## 2022-03-30 PROCEDURE — 36415 COLL VENOUS BLD VENIPUNCTURE: CPT | Performed by: PATHOLOGY

## 2022-03-30 PROCEDURE — 85014 HEMATOCRIT: CPT | Performed by: PATHOLOGY

## 2022-03-30 NOTE — PROGRESS NOTES
Chief Complaint   Patient presents with     Allied Health Visit     Aranesp      No Aranesp needed today. Hbg is at 11.     Nereida Steiner CMA

## 2022-03-30 NOTE — TELEPHONE ENCOUNTER
Anemia Management Note  SUBJECTIVE/OBJECTIVE:  Referred by Dr. Eloy Rao on 2021  Primary Diagnosis: Anemia in Chronic Kidney Disease (N18.3, D63.1)   3b  Secondary Diagnosis:  Chronic Kidney Disease, Stage 3 (N18.3)  3b  Liver Tx: 2019  Hgb goal range:  9-10  Epo/Darbo: Aranesp 100mcg every 7 days for Hgb <10.  In Clinic.  *22: dose changed to weekly per Dr. Rao  *dose increased to 100mcg starting 22  *dose increased to 60mcg starting with  21 dose  Iron regimen:  NA.  Iron levels stable  Labs : 2022  Recent IVELISSE use, transfusion, IV iron: Aranesp   RX/TX plans :  6/10/2022     No history of stroke, MI and blood clots.  History of hepatocellular carcinoma - s/p TACE 2019 and liver transplant 2019.     Contact:            Ok to leave message regarding scheduling, medical, and billing per consent to communicate dated 10/2/19                              OK to speak with Davi Saunders (friend/POA) regarding scheduling, medical, and billing per consent to communicate dated 10/2/19    Anemia Latest Ref Rng & Units 2022 2022 3/2/2022 3/9/2022 3/16/2022 3/23/2022 3/30/2022   IVELISSE Dose - 100 mcg 100 mcg 100 mcg 100 mcg 100 mcg - -   Hemoglobin 13.3 - 17.7 g/dL 7.9(L) 9.0(L) 8.6(L) 9.5(L) 9.7(L) 10.1(L) 11.0(L)   TSAT 15 - 46 % - - - - - - -   Ferritin 26 - 388 ng/mL - - - - - - -   PRBCs - - - - - - - -     BP Readings from Last 3 Encounters:   22 99/64   22 99/58   03/15/22 100/62     Wt Readings from Last 2 Encounters:   22 168 lb 12.8 oz (76.6 kg)   03/15/22 159 lb 4.8 oz (72.3 kg)           ASSESSMENT:  Hgb:Above goal - recommend hold dose  TSat: at goal >30% Ferritin: Elevated (>1000ng/mL)    PLAN:  Hold Aranesp and RTC for hgb then aranesp if needed in 1 week(s)    Orders needed to be renewed (for next follow-up date) in EPIC: None    Iron labs due:  May 2022    Plan discussed with:  No call, chart review      NEXT FOLLOW-UP DATE:   4/6/22 10a appt    Jie Blum RN   Anemia Services  [address] [address]   [email or web address]   Office : [phone]  Fax: [phone]

## 2022-03-30 NOTE — TELEPHONE ENCOUNTER
SM ASPIRIN ADULT LOW STRENG 81 TBEC   Last Written Prescription Date:  9/20/2021  Last Fill Quantity: 180,   # refills: 1  Last Office Visit :  12/7/2021  Future Office visit: None  Last filled by Dr. Moe who is gone.   Please send a new updated order with updated Providers signature for Pt care.   Thank you        Marsha Chung RN  Central Triage Red Flags/Med Refills

## 2022-04-06 ENCOUNTER — LAB (OUTPATIENT)
Dept: LAB | Facility: CLINIC | Age: 57
End: 2022-04-06
Attending: INTERNAL MEDICINE
Payer: MEDICARE

## 2022-04-06 ENCOUNTER — TELEPHONE (OUTPATIENT)
Dept: PHARMACY | Facility: CLINIC | Age: 57
End: 2022-04-06

## 2022-04-06 ENCOUNTER — ALLIED HEALTH/NURSE VISIT (OUTPATIENT)
Dept: TRANSPLANT | Facility: CLINIC | Age: 57
End: 2022-04-06
Attending: INTERNAL MEDICINE
Payer: MEDICARE

## 2022-04-06 DIAGNOSIS — D63.1 ANEMIA OF CHRONIC RENAL FAILURE, STAGE 3B (H): ICD-10-CM

## 2022-04-06 DIAGNOSIS — Z13.220 LIPID SCREENING: ICD-10-CM

## 2022-04-06 DIAGNOSIS — D63.1 ANEMIA DUE TO STAGE 4 CHRONIC KIDNEY DISEASE (H): Primary | ICD-10-CM

## 2022-04-06 DIAGNOSIS — N18.32 STAGE 3B CHRONIC KIDNEY DISEASE (H): ICD-10-CM

## 2022-04-06 DIAGNOSIS — N18.4 ANEMIA DUE TO STAGE 4 CHRONIC KIDNEY DISEASE (H): Primary | ICD-10-CM

## 2022-04-06 DIAGNOSIS — Z94.4 LIVER REPLACED BY TRANSPLANT (H): ICD-10-CM

## 2022-04-06 DIAGNOSIS — N18.32 ANEMIA OF CHRONIC RENAL FAILURE, STAGE 3B (H): ICD-10-CM

## 2022-04-06 LAB
ALBUMIN SERPL-MCNC: 4.4 G/DL (ref 3.4–5)
ALP SERPL-CCNC: 91 U/L (ref 40–150)
ALT SERPL W P-5'-P-CCNC: 28 U/L (ref 0–70)
ANION GAP SERPL CALCULATED.3IONS-SCNC: 7 MMOL/L (ref 3–14)
AST SERPL W P-5'-P-CCNC: 13 U/L (ref 0–45)
BILIRUB DIRECT SERPL-MCNC: <0.1 MG/DL (ref 0–0.2)
BILIRUB SERPL-MCNC: 0.3 MG/DL (ref 0.2–1.3)
BUN SERPL-MCNC: 50 MG/DL (ref 7–30)
CALCIUM SERPL-MCNC: 9.3 MG/DL (ref 8.5–10.1)
CHLORIDE BLD-SCNC: 106 MMOL/L (ref 94–109)
CO2 SERPL-SCNC: 25 MMOL/L (ref 20–32)
CREAT SERPL-MCNC: 1.81 MG/DL (ref 0.66–1.25)
ERYTHROCYTE [DISTWIDTH] IN BLOOD BY AUTOMATED COUNT: 15.1 % (ref 10–15)
GFR SERPL CREATININE-BSD FRML MDRD: 43 ML/MIN/1.73M2
GLUCOSE BLD-MCNC: 165 MG/DL (ref 70–99)
HCT VFR BLD AUTO: 37.2 % (ref 40–53)
HGB BLD-MCNC: 11.5 G/DL (ref 13.3–17.7)
MCH RBC QN AUTO: 31.9 PG (ref 26.5–33)
MCHC RBC AUTO-ENTMCNC: 30.9 G/DL (ref 31.5–36.5)
MCV RBC AUTO: 103 FL (ref 78–100)
PLATELET # BLD AUTO: 160 10E3/UL (ref 150–450)
POTASSIUM BLD-SCNC: 5.4 MMOL/L (ref 3.4–5.3)
PROT SERPL-MCNC: 7.5 G/DL (ref 6.8–8.8)
RBC # BLD AUTO: 3.61 10E6/UL (ref 4.4–5.9)
SODIUM SERPL-SCNC: 138 MMOL/L (ref 133–144)
WBC # BLD AUTO: 5 10E3/UL (ref 4–11)

## 2022-04-06 PROCEDURE — 80053 COMPREHEN METABOLIC PANEL: CPT | Performed by: PATHOLOGY

## 2022-04-06 PROCEDURE — 85027 COMPLETE CBC AUTOMATED: CPT | Performed by: PATHOLOGY

## 2022-04-06 PROCEDURE — 36415 COLL VENOUS BLD VENIPUNCTURE: CPT | Performed by: PATHOLOGY

## 2022-04-06 PROCEDURE — 82248 BILIRUBIN DIRECT: CPT | Performed by: PATHOLOGY

## 2022-04-06 NOTE — NURSING NOTE
No Arenesp was given today since hemoglobin was 11.5. He was advised to return in 1 week for repeat labs    Oriana Lerner CMA

## 2022-04-06 NOTE — TELEPHONE ENCOUNTER
Anemia Management Note  SUBJECTIVE/OBJECTIVE:  Referred by Dr. Eloy Rao on 2021  Primary Diagnosis: Anemia in Chronic Kidney Disease (N18.3, D63.1)   3b  Secondary Diagnosis:  Chronic Kidney Disease, Stage 3 (N18.3)  3b  Liver Tx: 2019  Hgb goal range:  9-10  Epo/Darbo: Aranesp 100mcg every 7 days for Hgb <10.  In Clinic.  *22: dose changed to weekly per Dr. Rao  *dose increased to 100mcg starting 22  *dose increased to 60mcg starting with  21 dose  Iron regimen:  NA.  Iron levels stable  Labs : 2022  Recent IVELISSE use, transfusion, IV iron: Aranesp   RX/TX plans :  6/10/2022     No history of stroke, MI and blood clots.  History of hepatocellular carcinoma - s/p TACE 2019 and liver transplant 2019.     Contact:            Ok to leave message regarding scheduling, medical, and billing per consent to communicate dated 10/2/19                              OK to speak with Davi HurstSaunders (friend/POA) regarding scheduling, medical, and billing per consent to communicate dated 10/2/19    Anemia Latest Ref Rng & Units 2022 3/2/2022 3/9/2022 3/16/2022 3/23/2022 3/30/2022 2022   IVELISSE Dose - 100 mcg 100 mcg 100 mcg 100 mcg - - -   Hemoglobin 13.3 - 17.7 g/dL 9.0(L) 8.6(L) 9.5(L) 9.7(L) 10.1(L) 11.0(L) 11.5(L)   TSAT 15 - 46 % - - - - - - -   Ferritin 26 - 388 ng/mL - - - - - - -   PRBCs - - - - - - - -     BP Readings from Last 3 Encounters:   22 99/64   22 99/58   03/15/22 100/62     Wt Readings from Last 2 Encounters:   22 168 lb 12.8 oz (76.6 kg)   03/15/22 159 lb 4.8 oz (72.3 kg)           ASSESSMENT:  Hgb:Above goal - recommend hold dose  TSat: at goal >30% Ferritin: Elevated (>1000ng/mL)    PLAN:  Hold Aranesp and RTC for hgb then aranesp if needed in 2 week(s). Hgb is doing well. Continues to improve.  Spoke with Miller, will try every 2 week Aranesp for now. Canceled his Aranesp appt for 22.    Orders needed to be renewed (for next  follow-up date) in EPIC: None      Plan discussed with:  Miller      NEXT FOLLOW-UP DATE:  4/20/22 10a appt    Jie Blum RN   Anemia Services  [address] [address]   [email or web address]   Office : [phone]  Fax: [phone]

## 2022-04-08 DIAGNOSIS — B19.10 HEPATITIS B: ICD-10-CM

## 2022-04-08 DIAGNOSIS — Z94.4 STATUS POST LIVER TRANSPLANTATION (H): ICD-10-CM

## 2022-04-08 RX ORDER — LAMIVUDINE 100 MG/1
100 TABLET, FILM COATED ORAL DAILY
Qty: 90 TABLET | Refills: 3 | Status: SHIPPED | OUTPATIENT
Start: 2022-04-08 | End: 2023-04-18

## 2022-04-13 ENCOUNTER — VIRTUAL VISIT (OUTPATIENT)
Dept: BEHAVIORAL HEALTH | Facility: CLINIC | Age: 57
End: 2022-04-13
Payer: MEDICARE

## 2022-04-13 DIAGNOSIS — F31.31 BIPOLAR 1 DISORDER, DEPRESSED, MILD (H): Primary | ICD-10-CM

## 2022-04-13 PROCEDURE — 90834 PSYTX W PT 45 MINUTES: CPT | Mod: 95 | Performed by: PSYCHOLOGIST
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
## 2022-04-13 NOTE — PROGRESS NOTES
11/14/19 1600   Quick Adds   Type of Visit Initial Occupational Therapy Evaluation   Living Environment   Lives With alone   Living Arrangements condominium   Home Accessibility stairs to enter home;stairs within home   Number of Stairs, Main Entrance other (see comments)  (20)   Stair Railings, Main Entrance railings on both sides of stairs   Number of Stairs, Within Home, Primary other (see comments)  (18)   Stair Railings, Within Home, Primary railings on both sides of stairs   Living Environment Comment pt must use stairs to enter and get to bedroom   Self-Care   Usual Activity Tolerance good   Current Activity Tolerance fair   Regular Exercise No   Functional Level   Ambulation 0-->independent   Transferring 0-->independent   Toileting 0-->independent   Bathing 0-->independent   Dressing 0-->independent   Eating 0-->independent   Communication 0-->understands/communicates without difficulty   Swallowing 0-->swallows foods/liquids without difficulty   Cognition 0 - no cognition issues reported   Fall history within last six months yes   Number of times patient has fallen within last six months 1   General Information   Referring Physician Maryanne Tello   Patient/Family Goals Statement return to ADL I. avoid rehab if able.    Additional Occupational Profile Info/Pertinent History of Current Problem  Frandy Workman is a 54M with PMHx significant for cirrhosis 2/2 ESTRADA diagnosed in 2013, HCC 2018, HTN, HLD, GERD, BPH and DMII who is admitted to the SICU s/p DDLT on 11/11/19 with Dr. Ramos  and exploratory laparotomy 11/12/2019.   Precautions/Limitations fall precautions;abdominal precautions   General Observations pt motivated in therapy   Cognitive Status Examination   Orientation orientation to person, place and time   Level of Consciousness alert;confused   Follows Commands (Cognition) WFL   Memory impaired   Attention Distractible during evaluation   Cognitive Comment further testing required. Pt  intermittently confused today.    Visual Perception   Visual Perception No deficits were identified;Wears glasses   Sensory Examination   Sensory Quick Adds No deficits were identified   Range of Motion (ROM)   ROM Quick Adds No deficits were identified   Strength   Manual Muscle Testing Quick Adds Other   Strength Comments B UE/LE grossly deconditioned, no MMT UE's 2/2 precautions   Hand Strength   Hand Strength Comments mild deficits   Coordination   Coordination Comments mild fine motor deficits in B hands.    Transfer Skill: Bed to Chair/Chair to Bed   Level of [place]: Bed to Chair minimum assist (75% patients effort)   Transfer Skill: Sit to Stand   Level of [place]: Sit/Stand minimum assist (75% patients effort)   Lower Body Dressing   Level of [place]: Dress Lower Body other (see comments)  (education required. )   Instrumental Activities of Daily Living (IADL)   Previous Responsibilities meal prep;housekeeping;laundry;shopping;medication management;finances;driving   Activities of Daily Living Analysis   Impairments Contributing to Impaired Activities of Daily Living cognition impaired;coordination impaired;fear and anxiety;pain;post surgical precautions;strength decreased   General Therapy Interventions   Planned Therapy Interventions ADL retraining;IADL retraining;cognition;fine motor coordination training;strengthening;transfer training;home program guidelines;progressive activity/exercise;risk factor education   Clinical Impression   Criteria for Skilled Therapeutic Interventions Met yes, treatment indicated   OT Diagnosis decreased ADL I   Assessment of Occupational Performance 5 or more Performance Deficits   Identified Performance Deficits dressing, bathing, G/H, toileting, home making.    Clinical Decision Making (Complexity) Low complexity   Therapy Frequency 5x/week   Predicted Duration of Therapy Intervention (days/wks) 2 weeks   Anticipated Discharge Disposition Transitional Care  "Facility   Risks and Benefits of Treatment have been explained. Yes   Patient, Family & other staff in agreement with plan of care Yes   Clinical Impression Comments Pt presents to OT with post surgical precautions and general deconditioning leading to decreased ADL I.    [facility] TM \"6 Clicks\"   2016, Trustees of [facility], under license to [employer].  All rights reserved.   6 Clicks Short Forms Daily Activity Inpatient Short Form   [facility] AM-PAC  \"6 Clicks\" Daily Activity Inpatient Short Form   1. Putting on and taking off regular lower body clothing? 2 - A Lot   2. Bathing (including washing, rinsing, drying)? 2 - A Lot   3. Toileting, which includes using toilet, bedpan or urinal? 2 - A Lot   4. Putting on and taking off regular upper body clothing? 2 - A Lot   5. Taking care of personal grooming such as brushing teeth? 3 - A Little   6. Eating meals? 3 - A Little   Daily Activity Raw Score (Score out of 24.Lower scores equate to lower levels of function) 14   Total Evaluation Time   Total Evaluation Time (Minutes) 5     " required frequent repetition. pt denied hearing deficits/hearing aides

## 2022-04-18 DIAGNOSIS — N18.32 STAGE 3B CHRONIC KIDNEY DISEASE (H): Primary | ICD-10-CM

## 2022-04-20 ENCOUNTER — ALLIED HEALTH/NURSE VISIT (OUTPATIENT)
Dept: TRANSPLANT | Facility: CLINIC | Age: 57
End: 2022-04-20
Attending: INTERNAL MEDICINE
Payer: MEDICARE

## 2022-04-20 ENCOUNTER — TELEPHONE (OUTPATIENT)
Dept: PHARMACY | Facility: CLINIC | Age: 57
End: 2022-04-20

## 2022-04-20 ENCOUNTER — LAB (OUTPATIENT)
Dept: LAB | Facility: CLINIC | Age: 57
End: 2022-04-20
Attending: INTERNAL MEDICINE
Payer: MEDICARE

## 2022-04-20 DIAGNOSIS — N18.32 ANEMIA OF CHRONIC RENAL FAILURE, STAGE 3B (H): ICD-10-CM

## 2022-04-20 DIAGNOSIS — N18.32 STAGE 3B CHRONIC KIDNEY DISEASE (H): ICD-10-CM

## 2022-04-20 DIAGNOSIS — N18.4 ANEMIA DUE TO STAGE 4 CHRONIC KIDNEY DISEASE (H): Primary | ICD-10-CM

## 2022-04-20 DIAGNOSIS — D63.1 ANEMIA DUE TO STAGE 4 CHRONIC KIDNEY DISEASE (H): Primary | ICD-10-CM

## 2022-04-20 DIAGNOSIS — D63.1 ANEMIA OF CHRONIC RENAL FAILURE, STAGE 3B (H): ICD-10-CM

## 2022-04-20 LAB
ALBUMIN SERPL-MCNC: 4.2 G/DL (ref 3.4–5)
ANION GAP SERPL CALCULATED.3IONS-SCNC: 6 MMOL/L (ref 3–14)
BUN SERPL-MCNC: 45 MG/DL (ref 7–30)
CALCIUM SERPL-MCNC: 9.2 MG/DL (ref 8.5–10.1)
CHLORIDE BLD-SCNC: 107 MMOL/L (ref 94–109)
CO2 SERPL-SCNC: 27 MMOL/L (ref 20–32)
CREAT SERPL-MCNC: 1.88 MG/DL (ref 0.66–1.25)
CREAT UR-MCNC: 155 MG/DL
CREAT UR-MCNC: 155 MG/DL
ERYTHROCYTE [DISTWIDTH] IN BLOOD BY AUTOMATED COUNT: 14.1 % (ref 10–15)
GFR SERPL CREATININE-BSD FRML MDRD: 41 ML/MIN/1.73M2
GLUCOSE BLD-MCNC: 130 MG/DL (ref 70–99)
HCT VFR BLD AUTO: 37.6 % (ref 40–53)
HGB BLD-MCNC: 11.7 G/DL (ref 13.3–17.7)
MCH RBC QN AUTO: 31.9 PG (ref 26.5–33)
MCHC RBC AUTO-ENTMCNC: 31.1 G/DL (ref 31.5–36.5)
MCV RBC AUTO: 103 FL (ref 78–100)
MICROALBUMIN UR-MCNC: 47 MG/L
MICROALBUMIN/CREAT UR: 30.32 MG/G CR (ref 0–17)
PHOSPHATE SERPL-MCNC: 4.2 MG/DL (ref 2.5–4.5)
PLATELET # BLD AUTO: 161 10E3/UL (ref 150–450)
POTASSIUM BLD-SCNC: 5.4 MMOL/L (ref 3.4–5.3)
PROT UR-MCNC: 0.18 G/L
PROT/CREAT 24H UR: 0.12 G/G CR (ref 0–0.2)
PTH-INTACT SERPL-MCNC: 59 PG/ML (ref 15–65)
RBC # BLD AUTO: 3.67 10E6/UL (ref 4.4–5.9)
SODIUM SERPL-SCNC: 140 MMOL/L (ref 133–144)
WBC # BLD AUTO: 4.6 10E3/UL (ref 4–11)

## 2022-04-20 PROCEDURE — 82306 VITAMIN D 25 HYDROXY: CPT | Performed by: INTERNAL MEDICINE

## 2022-04-20 PROCEDURE — 84156 ASSAY OF PROTEIN URINE: CPT | Performed by: PATHOLOGY

## 2022-04-20 PROCEDURE — 85027 COMPLETE CBC AUTOMATED: CPT | Performed by: PATHOLOGY

## 2022-04-20 PROCEDURE — 83970 ASSAY OF PARATHORMONE: CPT | Performed by: PATHOLOGY

## 2022-04-20 PROCEDURE — 80069 RENAL FUNCTION PANEL: CPT | Performed by: PATHOLOGY

## 2022-04-20 PROCEDURE — 82043 UR ALBUMIN QUANTITATIVE: CPT | Performed by: PATHOLOGY

## 2022-04-20 PROCEDURE — 36415 COLL VENOUS BLD VENIPUNCTURE: CPT | Performed by: PATHOLOGY

## 2022-04-20 NOTE — PROGRESS NOTES
Chief Complaint   Patient presents with     Allied Health Visit     Aranesp     No Aranesp given. Hbg was 11.7 per therapy no injection needed.    Nereida Steiner CMA

## 2022-04-20 NOTE — TELEPHONE ENCOUNTER
Anemia Management Note  SUBJECTIVE/OBJECTIVE:  Referred by Dr. Eloy Rao on 2021  Primary Diagnosis: Anemia in Chronic Kidney Disease (N18.3, D63.1)   3b  Secondary Diagnosis:  Chronic Kidney Disease, Stage 3 (N18.3)  3b  Liver Tx: 2019  Hgb goal range:  9-10  Epo/Darbo: Aranesp 100mcg every 7 days for Hgb <10.  In Clinic.  *22 ok to schedule every 2 weeks for now.   *22: dose changed to weekly per Dr. Rao  *dose increased to 100mcg starting 22  *dose increased to 60mcg starting with  21 dose  Iron regimen:  NA.  Iron levels stable  Labs : 2022  Recent IVELISSE use, transfusion, IV iron: Aranesp   RX/TX plans :  6/10/2022     No history of stroke, MI and blood clots.  History of hepatocellular carcinoma - s/p TACE 2019 and liver transplant 2019.     Contact:            Ok to leave message regarding scheduling, medical, and billing per consent to communicate dated 10/2/19                              OK to speak with Davi Saunders (friend/POA) regarding scheduling, medical, and billing per consent to communicate dated 10/2/19    Anemia Latest Ref Rng & Units 3/2/2022 3/9/2022 3/16/2022 3/23/2022 3/30/2022 2022 2022   IVELISSE Dose - 100 mcg 100 mcg 100 mcg - - - -   Hemoglobin 13.3 - 17.7 g/dL 8.6(L) 9.5(L) 9.7(L) 10.1(L) 11.0(L) 11.5(L) 11.7(L)   TSAT 15 - 46 % - - - - - - -   Ferritin 26 - 388 ng/mL - - - - - - -   PRBCs - - - - - - - -     BP Readings from Last 3 Encounters:   22 99/64   22 99/58   03/15/22 100/62     Wt Readings from Last 2 Encounters:   22 168 lb 12.8 oz (76.6 kg)   03/15/22 159 lb 4.8 oz (72.3 kg)           ASSESSMENT:  Hgb:Above goal - recommend hold dose  TSat: at goal >30% Ferritin: Elevated (>1000ng/mL)    PLAN:  Hold Aranesp and RTC for hgb then aranesp if needed in 2 week(s)    Orders needed to be renewed (for next follow-up date) in EPIC: None    Iron labs due:  May 2022    Plan discussed with:  No call, chart  review      NEXT FOLLOW-UP DATE:  5/4/22 9am appt    Jie Blum RN   [facility]  [address] [address]   [email or web address]   Office : [phone]  Fax: [phone]

## 2022-04-24 LAB
DEPRECATED CALCIDIOL+CALCIFEROL SERPL-MC: <64 UG/L (ref 20–75)
VITAMIN D2 SERPL-MCNC: <5 UG/L
VITAMIN D3 SERPL-MCNC: 59 UG/L

## 2022-04-25 ENCOUNTER — LAB (OUTPATIENT)
Dept: LAB | Facility: CLINIC | Age: 57
End: 2022-04-25
Attending: INTERNAL MEDICINE
Payer: MEDICARE

## 2022-04-25 ENCOUNTER — VIRTUAL VISIT (OUTPATIENT)
Dept: NEPHROLOGY | Facility: CLINIC | Age: 57
End: 2022-04-25
Attending: INTERNAL MEDICINE
Payer: MEDICARE

## 2022-04-25 DIAGNOSIS — D63.1 ANEMIA OF CHRONIC RENAL FAILURE, STAGE 3B (H): ICD-10-CM

## 2022-04-25 DIAGNOSIS — N18.32 ANEMIA OF CHRONIC RENAL FAILURE, STAGE 3B (H): ICD-10-CM

## 2022-04-25 DIAGNOSIS — R53.82 CHRONIC FATIGUE: ICD-10-CM

## 2022-04-25 DIAGNOSIS — N18.32 CHRONIC KIDNEY DISEASE, STAGE 3B (H): Primary | ICD-10-CM

## 2022-04-25 DIAGNOSIS — N18.32 STAGE 3B CHRONIC KIDNEY DISEASE (H): ICD-10-CM

## 2022-04-25 LAB
FERRITIN SERPL-MCNC: 508 NG/ML (ref 26–388)
HCT VFR BLD AUTO: 36.5 % (ref 40–53)
HGB BLD-MCNC: 11.3 G/DL (ref 13.3–17.7)
IRON SATN MFR SERPL: 42 % (ref 15–46)
IRON SERPL-MCNC: 119 UG/DL (ref 35–180)
TIBC SERPL-MCNC: 281 UG/DL (ref 240–430)
TSH SERPL DL<=0.005 MIU/L-ACNC: 1.24 MU/L (ref 0.4–4)

## 2022-04-25 PROCEDURE — 99443 PR PHYSICIAN TELEPHONE EVALUATION 21-30 MIN: CPT | Mod: 95 | Performed by: INTERNAL MEDICINE

## 2022-04-25 PROCEDURE — 36415 COLL VENOUS BLD VENIPUNCTURE: CPT | Performed by: PATHOLOGY

## 2022-04-25 NOTE — PROGRESS NOTES
Miller is a 57 year old who is being evaluated via a billable video visit.      How would you like to obtain your AVS? Mail a copy  If the video visit is dropped, the invitation should be resent by: Text to cell phone: [phone]  Will anyone else be joining your video visit? No      Video Start Time: 10:01 AM    Video-Visit Details    Video visit was converted to a telephone visit due to technical issues.      Total telephone visit time: 25 minutes    Eloy Rao MD

## 2022-04-25 NOTE — LETTER
2022       RE: Frandy Workman  [address] [facility] [address]     Dear Colleague,    Thank you for referring your patient, Frandy Workman, to the [facility] [place] [facility]. Please see a copy of my visit note below.    Miller is a 57 year old who is being evaluated via a billable video visit.      How would you like to obtain your AVS? Mail a copy  If the video visit is dropped, the invitation should be resent by: Text to cell phone: [phone]  Will anyone else be joining your video visit? No      Video Start Time: 10:01 AM    Video-Visit Details    Video visit was converted to a telephone visit due to technical issues.      Total telephone visit time: 25 minutes    Eloy Rao MD       Nephrology Clinic - Telephone Visit     Eloy Rao MD  2022     Name: Frandy Workman  MRN: [id number]  Age: 57 year old  : 1965  Referring provider: Natalie Russell     Assessment and Plan:  1. CKD, stage 3b (A3): Prior to recent liver transplant baseline Cr ~1.1 mg/dL with eGFR of 75 ml/min. Post-transplant creatinine has gradually fluctuated betwen  ~1.6-1.8 mg/dL (eGFR of 40 ml/min) and remains relatively stable though he has had much higher Cr in past hospitalizations that were more-renal in nature.  I suspect he likely has some degree of diabetic changes further complicated by chronic changes from past lithium use (for 15 years) and further complicated by tacrolimus toxicity.  He is at risk of progressive CKD due diabetes.  As mentioned, he is now off of tacrolimus and continues on MMF and prednisone for his liver transplant.  He did have worsening albuminuria that has significantly improved after restarting lisinopril ow dose lisinopril and empagliflozin at 10 mg daily (1085 mg/g to 30.3 mg/g).  -continue RAAS blockade with lisinopril at a low dose of 5 mg daily with goal of  slowing progeession of CKD and minimizing albuminuria  -continue empagliflozin but at a lower dose of 10 mg daiy with the goal of minimizing albuminuria, slowing CKD progression, and minimizing cardiovascular events given his past CABG and ischemic HFrEF (45-50% by TTE In July, 2021)      -RTC in 6 months     2. HTN/Volume status: Euvolemic. Home BP at goal of goal today f at least <130/80.    -he will continue metoprolol XL at 12.5 mg and lisinopril 5 mg daily  -as mentioned we will also start RAAS blockade with lisinopril at 5 mg daily (see problem 1)  -continue recently restarted empagliflozin which likely has led to better blood pressure control as well  -he will measure his BP daily and report to clinic for further adjustments.       3.  Electrolytes:  Potassium on higher side in past.  Suspect multifactorial in nature and due to CKD with underlying type 4 RTA physiology, hyperglycemia and past use of tacrolimus. It did improve after discontinuation of tacrolimus but was borderline high on the most recent check.    -will refer to a dietician for more education regarding a low potassium diet.       4. Anemia: Possibly related to Imuran in the past, which has been discontinued.  Iron stores are replete.  Anemia of chronic disease and renal insufficiency also likely contributing.  He did IVELISSE treatment and hemoglobin improved to 13.9 in the past.  Hemoglobin dropped again requiring restarting IVELISSE therapy (and a blood transfusion).  Fortunately, hemoglobin now stable in the 11's.        -he will continue to follow in the anemia management clinic     5.  Fatigue:  Suspect related to multiple comorbidities and deconditioned state.  We did check a TSH that was within normal limits.     -monitor for now    Follow-up: RTC in 6 months     Reason For Visit:   CKD    HPI:   Frandy Workman is a 57 year old man who presents for CKD f/u.  His past medical history is remarkable for liver transplant (November, 2019) for ESTRADA  cirrhosis (complicated by ascites and hepatic encephalopathy on lactulose and rifaximin in the past), hepatocellular carcinoma (s/p TACE and microwave ablation 01/2019), long standing DM 2 (complicated by nonproliferative diabetic retinopathy, macular edema and peripheral neuropathy), bipolar disorder (previously on lithium for 15 years), HTN, hyperlipidemia and CAD by angiography not requiring PCI.      He denies excessive use of NSAIDs in the past.  He had no history of nephrolithiasis or frequent UTIs.  He has no significant family history of CKD.     In terms of CKD, he appeared to have a baseline of ~1.1 mg/dL prior to his liver transplant in November 2019.  Creatinine after transplant was ~1.3 mg/dL at time of discharge and vikram to 3.2 mg/dL thought to be prerenal from volume depletion.  He was admitted for IVF and creatinine improved to 1.5 mg/dL at time of discharge.  His serum Cr now seems to fluctuate around 1.8 mg/dL with an eGFR of 40 ml/min.  He continues on tacrolimus for his liver transplant.  He no longer is on lasix for management of edema.  He has not required IVELISSE therapy in several months (last August, 2020).  He recently was started on low dose carvedilol for management of hypertension.  He also recently started empagliflozin for management of diabetes. He reports weight gain due to lack of exercise during the COVID pandemic.  His main complaint today is that of pain near his incisional scars for his liver transplant for which he is following up with hepatology.  His BP this morning was 129/84.      Interval History:  Overall, Mr. Workman feels well.  In July, 2021 he had unstble angina in the setting of severe anemia and eventually was noted to have a NSTEMI and underwent a CABG.  He was started on lisinopril and metoprolol but lisinopril has since been discontinued due to hypotension. His empagliflozin was discontinued during his recent hospitalization. He underwent cardiac rehab and has  recovered well.  Anemia has been an intermittent issue requiring blood transfusions and IVELISSE therapy.  Fortunately, his hemoglobn has now been stable in the 11's.  His only complaint today is that of fatigue.  He otherwise, has no major medical complaints.        Review of Systems:   Pertinent items are noted in HPI or as below, remainder of complete ROS is negative.      Active Medications:   Current Outpatient Medications   Medication     Acetaminophen (TYLENOL) 325 MG CAPS     ARIPiprazole (ABILIFY) 5 MG tablet     aspirin (SM ASPIRIN ADULT LOW STRENGTH) 81 MG EC tablet     BD VIKTORIA U/F 32G X 4 MM insulin pen needle     ciclopirox (LOPROX) 0.77 % cream     Continuous Blood Gluc  (FREESTYLE CLAUDIA 14 DAY READER) CECILIO     Continuous Blood Gluc Sensor (FREESTYLE CLAUDIA 2 SENSOR) MISC     cyanocobalamin (VITAMIN B-12) 1000 MCG tablet     empagliflozin (JARDIANCE) 10 MG TABS tablet     insulin aspart (NOVOLOG PEN) 100 UNIT/ML pen     insulin degludec (TRESIBA FLEXTOUCH) 100 UNIT/ML pen     lamiVUDine (EPIVIR) 100 MG tablet     lisinopril (ZESTRIL) 5 MG tablet     methocarbamol (ROBAXIN) 750 MG tablet     metoprolol succinate ER (TOPROL-XL) 25 MG 24 hr tablet     Multiple Vitamin (TAB-A-GLADIS) TABS     mycophenolate (GENERIC EQUIVALENT) 250 MG capsule     order for DME     pantoprazole (PROTONIX) 40 MG EC tablet     polyethylene glycol (MIRALAX) 17 g packet     predniSONE (DELTASONE) 5 MG tablet     rosuvastatin (CRESTOR) 5 MG tablet     senna-docusate (SENOKOT-S/PERICOLACE) 8.6-50 MG tablet     tamsulosin (FLOMAX) 0.4 MG capsule     vitamin D3 (CHOLECALCIFEROL) 50 mcg (2000 units) tablet     Current Facility-Administered Medications   Medication     aflibercept (EYLEA) injection prefilled syringe 2 mg     aflibercept (EYLEA) injection prefilled syringe 2 mg        Allergies:   Codeine and Seasonal allergies      Past Medical History:  Chronic kidney disease, stage 3  Type 2 diabetes mellitus  Bipolar affective  disorder   Brow ptosis  Hepatocellular carcinoma  Coronary artery disease s/p CABG  Hypertension   Anemia   Anxiety   Mild recurrent major depression  Mild nonproliferative retinopathy  Gastroesophageal reflux disease   Cholelithiasis   Benign prostatic hypertrophy   Portal vein thrombosis      Past Surgical History:  Liver transplant recipient   Coronary catheterization  Parotidectomy, radical neck dissection  Right eyelid weight placement  Orthopedic surgery    Family History:   Prostate cancer - maternal grandfather, father   Substance abuse - maternal grandfather, maternal grandmother   Colon cancer - father, paternal grandmother  Cancer - mother  Thyroid disease - mother, sister   Stroke - mother  Depression - mother, sister  Asthma - sister      Social History:   Presents to clinic alone  Tobacco Use: Former smoker, 180 pack year history, quit 2017.   Alcohol Use: quit September 1996  PCP: Mohamud Tavarez      Physical Exam:  There were no vitals taken for this visit.   Physical exam deferred as encounter was a telephone visit.      Laboratory:  CMP  Recent Labs   Lab Test 04/20/22  0912 04/06/22  0926 03/15/22  1022 02/03/22  0904 01/12/22  1211 01/12/22  0711 01/11/22  2200 01/11/22  0905 01/10/22  2142 11/26/21  0747 10/04/21  0802 09/24/21  1000 08/23/21  0945 08/09/21  1022 07/21/21  1057 07/21/21  0617 07/20/21  2233 07/20/21  2009 07/12/21  1036 06/28/21  1347 06/14/21  1322 06/04/21  1236 05/05/21  0909    138 140 140   < > 140 137  --  135   < > 141 139   < > 136   < > 136  --   --    < > 137 139 138 139   POTASSIUM 5.4* 5.4* 5.3 4.4   < > 4.6 5.0  --  5.8*   < > 4.2 4.6   < > 4.4   < > 4.1  --   --    < > 4.6 4.7 4.6 4.5   CHLORIDE 107 106 110* 105   < > 112* 109  --  106   < > 110* 105   < > 105   < > 104  --   --    < > 107 106 106 107   CO2 27 25 24 27   < > 18* 20  --  19*   < > 28 27   < > 24   < > 23  --   --    < > 25 26 26 26   ANIONGAP 6 7 6 8   < > 10 8  --  10   < > 3 7   < > 7    < > 9  --   --    < > 5 7 6 6   * 165* 139* 165*   < > 122* 156*   < > 195*   < > 132* 140*   < > 283*   < > 170*   < >  --    < > 208* 173* 156* 258*   BUN 45* 50* 46* 29   < > 60* 68*  --  78*   < > 23 33*   < > 24   < > 18  --   --    < > 33* 29 39* 38*   CR 1.88* 1.81* 1.72* 1.54*   < > 1.72* 2.03*  --  2.21*   < > 1.26* 1.30*   < > 1.50*   < > 1.03  --   --    < > 1.62* 1.54* 1.64* 1.52*   GFRESTIMATED 41* 43* 46* 52*   < > 46* 38*  --  34*   < > 63 61   < > 51*   < > 80  --   --    < > 46* 49* 46* 50*   GFRESTBLACK  --   --   --   --   --   --   --   --   --   --   --   --   --   --   --   --   --   --   --  54* 57* 53* 58*   DEBORAH 9.2 9.3 9.9 9.0   < > 9.5 9.7  --  10.4*   < > 8.7 9.2   < > 9.4   < > 7.1*  --   --    < > 9.1 9.8 10.2* 9.6   MAG  --   --   --   --   --   --   --   --  2.3  --   --  2.0  --  1.8  --  2.7*  --   --    < >  --   --  2.2  --    PHOS 4.2  --   --   --   --   --   --   --   --   --  3.2  --   --   --   --  3.1  --  1.8*   < >  --   --   --   --    PROTTOTAL  --  7.5  --  6.8  --  7.1 7.6  --  8.6   < >  --  7.3   < > 7.2   < > 6.2*  --   --    < > 7.4 7.8 7.8 7.8   ALBUMIN 4.2 4.4  --  3.8  --  3.1* 3.3*  --  3.6   < > 4.0 4.0   < > 3.7   < > 2.7*  --   --    < > 4.0 4.1 4.2 4.2   BILITOTAL  --  0.3  --  0.4  --  0.5 0.3  --  0.4   < >  --  0.6   < > 0.4   < > 0.5  --   --    < > 0.5 0.6 0.5 0.5   ALKPHOS  --  91  --  99  --  98 98  --  106   < >  --  78   < > 118   < > 126  --   --    < > 98 106 108 112   AST  --  13  --  15  --  8 12  --  25   < >  --  6   < > 5   < > 12  --   --    < > 9 8 10 13   ALT  --  28  --  20  --  14 15  --  19   < >  --  17   < > 15   < > 17  --   --    < > 20 21 26 28    < > = values in this interval not displayed.     CBC  Recent Labs   Lab Test 04/20/22  0912 04/06/22  0926 03/30/22  0827 03/23/22  0826 02/09/22  0850 02/03/22  0904 01/13/22  0724   HGB 11.7* 11.5* 11.0* 10.1*   < > 6.3* 7.7*   WBC 4.6 5.0  --   --   --  2.2* 3.1*   RBC 3.67*  3.61*  --   --   --  2.14* 2.54*   HCT 37.6* 37.2* 36.6* 34.1*   < > 21.0* 24.3*   * 103*  --   --   --  98 96   MCH 31.9 31.9  --   --   --  29.4 30.3   MCHC 31.1* 30.9*  --   --   --  30.0* 31.7   RDW 14.1 15.1*  --   --   --  18.8* 16.5*    160  --   --   --  208 120*    < > = values in this interval not displayed.     INR  Recent Labs   Lab Test 01/11/22  2200 07/14/21  1351 07/14/21  1215 07/12/21  1608 11/12/19  1645 11/12/19  1400 11/12/19  0950 11/12/19  0346   INR 1.13 1.28* 1.45* 0.96   < > 1.46*   < > 1.47*   PTT  --  46* 37  --   --  39*  --  57*    < > = values in this interval not displayed.     ABG  Recent Labs   Lab Test 01/10/22  2314 07/15/21  0425 07/14/21  2049 07/14/21  1605 07/14/21  1352 07/14/21  1351 07/14/21  1351 07/14/21  1252 07/14/21  1216   PH 7.37  --   --  7.37  --   --  7.29* 7.36 7.38   PCO2  --   --   --  37  --   --  44 35 36   PO2  --   --   --  87  --   --  145* 181* 306*   HCO3  --   --   --  21  --   --  21 20* 21   O2PER  --  21 21 21 5   < > 5 50.0 75.0    < > = values in this interval not displayed.      URINE STUDIES  Recent Labs   Lab Test 01/11/22  0001 09/07/21  1115 12/04/19  1400 11/15/19  1123   COLOR Light Yellow Yellow Light Yellow Light Yellow   APPEARANCE Clear Cloudy* Clear Clear   URINEGLC >=1000* 50 * 30* 300*   URINEBILI Negative Negative Negative Negative   URINEKETONE Negative Negative Negative Negative   SG 1.018 1.022 1.009 1.007   UBLD Negative Small* Negative Small*   URINEPH 5.0 5.0 5.0 6.5   PROTEIN Negative >499* 30* Negative   NITRITE Negative Negative Negative Negative   LEUKEST Negative Negative Negative Negative   RBCU <1 1 0 0   WBCU 1 1 1 <1     Recent Labs   Lab Test 04/20/22  0919 10/04/21  0804 09/07/21  1115 02/26/21  0750 06/29/20  1008 03/02/20  1013 12/02/19  1040 07/08/19  1017 02/04/19  0705   UTPG 0.12 1.46* 1.17* 0.47* 0.89* 0.29* 1.22* 0.11 0.14     PTH  Recent Labs   Lab Test 04/20/22  0912 10/04/21  0802  06/29/20  1005 07/08/19  1020   PTHI 59 81* 61 54     IRON STUDIES   Recent Labs   Lab Test 02/09/22  0850 11/16/21  1004 11/02/21  1031 07/12/21  1608 06/28/21  1347 06/04/21  1236 12/02/20  0739 11/11/20  0754 10/14/20  0836 09/16/20  0802 07/29/20  0749 06/29/20  1005 05/21/20  0723 04/22/20  0833 03/09/20  0823 01/27/20  1340 12/02/19  1034 12/28/18  1108 09/15/18  1215 06/09/17  1250   IRON 96 93 109  --  112 158 75 81 73 93 87 60 72 65 92  --  88  --  52  --     211* 241  --  226* 270 228* 227* 248 221* 230* 204* 192* 215* 231*  --  248  --  403  --    IRONSAT 39 44 45  --  50* 59* 33 36 29 42 38 29 38 30 40  --  36  --  13*  --    QUAN 1,046* 400* 479* 543* 495* 399* 433* 286 323 223 193 352 344 643* 859* 690* 1,353* 90 10* 450*       All above labs reviewed by me.   Eloy Rao MD   [phone]      Again, thank you for allowing me to participate in the care of your patient.      Sincerely,    Eloy Rao MD

## 2022-04-25 NOTE — PROGRESS NOTES
Nephrology Clinic - Telephone Visit     Eloy Rao MD  2022     Name: Frandy Workman  MRN: [id number]  Age: 57 year old  : 1965  Referring provider: Natalie Russell     Assessment and Plan:  1. CKD, stage 3b (A3): Prior to recent liver transplant baseline Cr ~1.1 mg/dL with eGFR of 75 ml/min. Post-transplant creatinine has gradually fluctuated betwen  ~1.6-1.8 mg/dL (eGFR of 40 ml/min) and remains relatively stable though he has had much higher Cr in past hospitalizations that were more-renal in nature.  I suspect he likely has some degree of diabetic changes further complicated by chronic changes from past lithium use (for 15 years) and further complicated by tacrolimus toxicity.  He is at risk of progressive CKD due diabetes.  As mentioned, he is now off of tacrolimus and continues on MMF and prednisone for his liver transplant.  He did have worsening albuminuria that has significantly improved after restarting lisinopril ow dose lisinopril and empagliflozin at 10 mg daily (1085 mg/g to 30.3 mg/g).  -continue RAAS blockade with lisinopril at a low dose of 5 mg daily with goal of slowing progeession of CKD and minimizing albuminuria  -continue empagliflozin but at a lower dose of 10 mg daiy with the goal of minimizing albuminuria, slowing CKD progression, and minimizing cardiovascular events given his past CABG and ischemic HFrEF (45-50% by TTE In )      -RTC in 6 months     2. HTN/Volume status: Euvolemic. Home BP at goal of goal today f at least <130/80.    -he will continue metoprolol XL at 12.5 mg and lisinopril 5 mg daily  -as mentioned we will also start RAAS blockade with lisinopril at 5 mg daily (see problem 1)  -continue recently restarted empagliflozin which likely has led to better blood pressure control as well  -he will measure his BP daily and report to clinic for further adjustments.       3.  Electrolytes:  Potassium on higher side in past.   Suspect multifactorial in nature and due to CKD with underlying type 4 RTA physiology, hyperglycemia and past use of tacrolimus. It did improve after discontinuation of tacrolimus but was borderline high on the most recent check.    -will refer to a dietician for more education regarding a low potassium diet.       4. Anemia: Possibly related to Imuran in the past, which has been discontinued.  Iron stores are replete.  Anemia of chronic disease and renal insufficiency also likely contributing.  He did IVELISSE treatment and hemoglobin improved to 13.9 in the past.  Hemoglobin dropped again requiring restarting IVELISSE therapy (and a blood transfusion).  Fortunately, hemoglobin now stable in the 11's.        -he will continue to follow in the anemia management clinic     5.  Fatigue:  Suspect related to multiple comorbidities and deconditioned state.  We did check a TSH that was within normal limits.     -monitor for now    Follow-up: RTC in 6 months     Reason For Visit:   CKD    HPI:   Frandy Workman is a 57 year old man who presents for CKD f/u.  His past medical history is remarkable for liver transplant (November, 2019) for ESTRADA cirrhosis (complicated by ascites and hepatic encephalopathy on lactulose and rifaximin in the past), hepatocellular carcinoma (s/p TACE and microwave ablation 01/2019), long standing DM 2 (complicated by nonproliferative diabetic retinopathy, macular edema and peripheral neuropathy), bipolar disorder (previously on lithium for 15 years), HTN, hyperlipidemia and CAD by angiography not requiring PCI.      He denies excessive use of NSAIDs in the past.  He had no history of nephrolithiasis or frequent UTIs.  He has no significant family history of CKD.     In terms of CKD, he appeared to have a baseline of ~1.1 mg/dL prior to his liver transplant in November 2019.  Creatinine after transplant was ~1.3 mg/dL at time of discharge and vikram to 3.2 mg/dL thought to be prerenal from volume depletion.   He was admitted for IVF and creatinine improved to 1.5 mg/dL at time of discharge.  His serum Cr now seems to fluctuate around 1.8 mg/dL with an eGFR of 40 ml/min.  He continues on tacrolimus for his liver transplant.  He no longer is on lasix for management of edema.  He has not required IVELISSE therapy in several months (last August, 2020).  He recently was started on low dose carvedilol for management of hypertension.  He also recently started empagliflozin for management of diabetes. He reports weight gain due to lack of exercise during the COVID pandemic.  His main complaint today is that of pain near his incisional scars for his liver transplant for which he is following up with hepatology.  His BP this morning was 129/84.      Interval History:  Overall, Mr. Workman feels well.  In July, 2021 he had unstble angina in the setting of severe anemia and eventually was noted to have a NSTEMI and underwent a CABG.  He was started on lisinopril and metoprolol but lisinopril has since been discontinued due to hypotension. His empagliflozin was discontinued during his recent hospitalization. He underwent cardiac rehab and has recovered well.  Anemia has been an intermittent issue requiring blood transfusions and IVELISSE therapy.  Fortunately, his hemoglobn has now been stable in the 11's.  His only complaint today is that of fatigue.  He otherwise, has no major medical complaints.        Review of Systems:   Pertinent items are noted in HPI or as below, remainder of complete ROS is negative.      Active Medications:   Current Outpatient Medications   Medication     Acetaminophen (TYLENOL) 325 MG CAPS     ARIPiprazole (ABILIFY) 5 MG tablet     aspirin (SM ASPIRIN ADULT LOW STRENGTH) 81 MG EC tablet     BD VIKTORIA U/F 32G X 4 MM insulin pen needle     ciclopirox (LOPROX) 0.77 % cream     Continuous Blood Gluc  (FREESTYLE CLAUDIA 14 DAY READER) CECILIO     Continuous Blood Gluc Sensor (FREESTYLE CLAUDIA 2 SENSOR) [facility] [place]      cyanocobalamin (VITAMIN B-12) 1000 MCG tablet     empagliflozin (JARDIANCE) 10 MG TABS tablet     insulin aspart (NOVOLOG PEN) 100 UNIT/ML pen     insulin degludec (TRESIBA FLEXTOUCH) 100 UNIT/ML pen     lamiVUDine (EPIVIR) 100 MG tablet     lisinopril (ZESTRIL) 5 MG tablet     methocarbamol (ROBAXIN) 750 MG tablet     metoprolol succinate ER (TOPROL-XL) 25 MG 24 hr tablet     Multiple Vitamin (TAB-A-GLADIS) TABS     mycophenolate (GENERIC EQUIVALENT) 250 MG capsule     order for DME     pantoprazole (PROTONIX) 40 MG EC tablet     polyethylene glycol (MIRALAX) 17 g packet     predniSONE (DELTASONE) 5 MG tablet     rosuvastatin (CRESTOR) 5 MG tablet     senna-docusate (SENOKOT-S/PERICOLACE) 8.6-50 MG tablet     tamsulosin (FLOMAX) 0.4 MG capsule     vitamin D3 (CHOLECALCIFEROL) 50 mcg (2000 units) tablet     Current Facility-Administered Medications   Medication     aflibercept (EYLEA) injection prefilled syringe 2 mg     aflibercept (EYLEA) injection prefilled syringe 2 mg        Allergies:   Codeine and Seasonal allergies      Past Medical History:  Chronic kidney disease, stage 3  Type 2 diabetes mellitus  Bipolar affective disorder   Brow ptosis  Hepatocellular carcinoma  Coronary artery disease s/p CABG  Hypertension   Anemia   Anxiety   Mild recurrent major depression  Mild nonproliferative retinopathy  Gastroesophageal reflux disease   Cholelithiasis   Benign prostatic hypertrophy   Portal vein thrombosis      Past Surgical History:  Liver transplant recipient   Coronary catheterization  Parotidectomy, radical neck dissection  Right eyelid weight placement  Orthopedic surgery    Family History:   Prostate cancer - maternal grandfather, father   Substance abuse - maternal grandfather, maternal grandmother   Colon cancer - father, paternal grandmother  Cancer - mother  Thyroid disease - mother, sister   Stroke - mother  Depression - mother, sister  Asthma - sister      Social History:   Presents to clinic  alone  Tobacco Use: Former smoker, 180 pack year history, quit 2017.   Alcohol Use: quit September 1996  PCP: Mohamud Tavarez      Physical Exam:  There were no vitals taken for this visit.   Physical exam deferred as encounter was a telephone visit.      Laboratory:  CMP  Recent Labs   Lab Test 04/20/22  0912 04/06/22  0926 03/15/22  1022 02/03/22  0904 01/12/22  1211 01/12/22  0711 01/11/22  2200 01/11/22  0905 01/10/22  2142 11/26/21  0747 10/04/21  0802 09/24/21  1000 08/23/21  0945 08/09/21  1022 07/21/21  1057 07/21/21  0617 07/20/21  2233 07/20/21  2009 07/12/21  1036 06/28/21  1347 06/14/21  1322 06/04/21  1236 05/05/21  0909    138 140 140   < > 140 137  --  135   < > 141 139   < > 136   < > 136  --   --    < > 137 139 138 139   POTASSIUM 5.4* 5.4* 5.3 4.4   < > 4.6 5.0  --  5.8*   < > 4.2 4.6   < > 4.4   < > 4.1  --   --    < > 4.6 4.7 4.6 4.5   CHLORIDE 107 106 110* 105   < > 112* 109  --  106   < > 110* 105   < > 105   < > 104  --   --    < > 107 106 106 107   CO2 27 25 24 27   < > 18* 20  --  19*   < > 28 27   < > 24   < > 23  --   --    < > 25 26 26 26   ANIONGAP 6 7 6 8   < > 10 8  --  10   < > 3 7   < > 7   < > 9  --   --    < > 5 7 6 6   * 165* 139* 165*   < > 122* 156*   < > 195*   < > 132* 140*   < > 283*   < > 170*   < >  --    < > 208* 173* 156* 258*   BUN 45* 50* 46* 29   < > 60* 68*  --  78*   < > 23 33*   < > 24   < > 18  --   --    < > 33* 29 39* 38*   CR 1.88* 1.81* 1.72* 1.54*   < > 1.72* 2.03*  --  2.21*   < > 1.26* 1.30*   < > 1.50*   < > 1.03  --   --    < > 1.62* 1.54* 1.64* 1.52*   GFRESTIMATED 41* 43* 46* 52*   < > 46* 38*  --  34*   < > 63 61   < > 51*   < > 80  --   --    < > 46* 49* 46* 50*   GFRESTBLACK  --   --   --   --   --   --   --   --   --   --   --   --   --   --   --   --   --   --   --  54* 57* 53* 58*   DEBORAH 9.2 9.3 9.9 9.0   < > 9.5 9.7  --  10.4*   < > 8.7 9.2   < > 9.4   < > 7.1*  --   --    < > 9.1 9.8 10.2* 9.6   MAG  --   --   --   --   --   --    --   --  2.3  --   --  2.0  --  1.8  --  2.7*  --   --    < >  --   --  2.2  --    PHOS 4.2  --   --   --   --   --   --   --   --   --  3.2  --   --   --   --  3.1  --  1.8*   < >  --   --   --   --    PROTTOTAL  --  7.5  --  6.8  --  7.1 7.6  --  8.6   < >  --  7.3   < > 7.2   < > 6.2*  --   --    < > 7.4 7.8 7.8 7.8   ALBUMIN 4.2 4.4  --  3.8  --  3.1* 3.3*  --  3.6   < > 4.0 4.0   < > 3.7   < > 2.7*  --   --    < > 4.0 4.1 4.2 4.2   BILITOTAL  --  0.3  --  0.4  --  0.5 0.3  --  0.4   < >  --  0.6   < > 0.4   < > 0.5  --   --    < > 0.5 0.6 0.5 0.5   ALKPHOS  --  91  --  99  --  98 98  --  106   < >  --  78   < > 118   < > 126  --   --    < > 98 106 108 112   AST  --  13  --  15  --  8 12  --  25   < >  --  6   < > 5   < > 12  --   --    < > 9 8 10 13   ALT  --  28  --  20  --  14 15  --  19   < >  --  17   < > 15   < > 17  --   --    < > 20 21 26 28    < > = values in this interval not displayed.     CBC  Recent Labs   Lab Test 04/20/22  0912 04/06/22  0926 03/30/22  0827 03/23/22  0826 02/09/22  0850 02/03/22  0904 01/13/22  0724   HGB 11.7* 11.5* 11.0* 10.1*   < > 6.3* 7.7*   WBC 4.6 5.0  --   --   --  2.2* 3.1*   RBC 3.67* 3.61*  --   --   --  2.14* 2.54*   HCT 37.6* 37.2* 36.6* 34.1*   < > 21.0* 24.3*   * 103*  --   --   --  98 96   MCH 31.9 31.9  --   --   --  29.4 30.3   MCHC 31.1* 30.9*  --   --   --  30.0* 31.7   RDW 14.1 15.1*  --   --   --  18.8* 16.5*    160  --   --   --  208 120*    < > = values in this interval not displayed.     INR  Recent Labs   Lab Test 01/11/22  2200 07/14/21  1351 07/14/21  1215 07/12/21  1608 11/12/19  1645 11/12/19  1400 11/12/19  0950 11/12/19  0346   INR 1.13 1.28* 1.45* 0.96   < > 1.46*   < > 1.47*   PTT  --  46* 37  --   --  39*  --  57*    < > = values in this interval not displayed.     ABG  Recent Labs   Lab Test 01/10/22  2314 07/15/21  0425 07/14/21  2049 07/14/21  1605 07/14/21  1352 07/14/21  1351 07/14/21  1351 07/14/21  1252 07/14/21  1216   PH  7.37  --   --  7.37  --   --  7.29* 7.36 7.38   PCO2  --   --   --  37  --   --  44 35 36   PO2  --   --   --  87  --   --  145* 181* 306*   HCO3  --   --   --  21  --   --  21 20* 21   O2PER  --  21 21 21 5   < > 5 50.0 75.0    < > = values in this interval not displayed.      URINE STUDIES  Recent Labs   Lab Test 01/11/22  0001 09/07/21  1115 12/04/19  1400 11/15/19  1123   COLOR Light Yellow Yellow Light Yellow Light Yellow   APPEARANCE Clear Cloudy* Clear Clear   URINEGLC >=1000* 50 * 30* 300*   URINEBILI Negative Negative Negative Negative   URINEKETONE Negative Negative Negative Negative   SG 1.018 1.022 1.009 1.007   UBLD Negative Small* Negative Small*   URINEPH 5.0 5.0 5.0 6.5   PROTEIN Negative >499* 30* Negative   NITRITE Negative Negative Negative Negative   LEUKEST Negative Negative Negative Negative   RBCU <1 1 0 0   WBCU 1 1 1 <1     Recent Labs   Lab Test 04/20/22  0919 10/04/21  0804 09/07/21  1115 02/26/21  0750 06/29/20  1008 03/02/20  1013 12/02/19  1040 07/08/19  1017 02/04/19  0705   UTPG 0.12 1.46* 1.17* 0.47* 0.89* 0.29* 1.22* 0.11 0.14     PTH  Recent Labs   Lab Test 04/20/22  0912 10/04/21  0802 06/29/20  1005 07/08/19  1020   PTHI 59 81* 61 54     IRON STUDIES   Recent Labs   Lab Test 02/09/22  0850 11/16/21  1004 11/02/21  1031 07/12/21  1608 06/28/21  1347 06/04/21  1236 12/02/20  0739 11/11/20  0754 10/14/20  0836 09/16/20  0802 07/29/20  0749 06/29/20  1005 05/21/20  0723 04/22/20  0833 03/09/20  0823 01/27/20  1340 12/02/19  1034 12/28/18  1108 09/15/18  1215 06/09/17  1250   IRON 96 93 109  --  112 158 75 81 73 93 87 60 72 65 92  --  88  --  52  --     211* 241  --  226* 270 228* 227* 248 221* 230* 204* 192* 215* 231*  --  248  --  403  --    IRONSAT 39 44 45  --  50* 59* 33 36 29 42 38 29 38 30 40  --  36  --  13*  --    QUAN 1,046* 400* 479* 543* 495* 399* 433* 286 323 223 193 352 344 643* 859* 690* 1,353* 90 10* 450*       All above labs reviewed by me.   Eloy Conner  MD [facility]   [phone]

## 2022-04-29 DIAGNOSIS — Z94.4 STATUS POST LIVER TRANSPLANTATION (H): ICD-10-CM

## 2022-04-29 RX ORDER — PREDNISONE 5 MG/1
TABLET ORAL
Qty: 90 TABLET | Refills: 3 | Status: SHIPPED | OUTPATIENT
Start: 2022-04-29 | End: 2022-08-03

## 2022-05-04 ENCOUNTER — LAB (OUTPATIENT)
Dept: LAB | Facility: CLINIC | Age: 57
End: 2022-05-04
Payer: MEDICARE

## 2022-05-04 ENCOUNTER — TELEPHONE (OUTPATIENT)
Dept: PHARMACY | Facility: CLINIC | Age: 57
End: 2022-05-04

## 2022-05-04 ENCOUNTER — ALLIED HEALTH/NURSE VISIT (OUTPATIENT)
Dept: TRANSPLANT | Facility: CLINIC | Age: 57
End: 2022-05-04
Attending: INTERNAL MEDICINE
Payer: MEDICARE

## 2022-05-04 DIAGNOSIS — N18.4 ANEMIA DUE TO STAGE 4 CHRONIC KIDNEY DISEASE (H): Primary | ICD-10-CM

## 2022-05-04 DIAGNOSIS — D63.1 ANEMIA DUE TO STAGE 4 CHRONIC KIDNEY DISEASE (H): Primary | ICD-10-CM

## 2022-05-04 DIAGNOSIS — D63.1 ANEMIA OF CHRONIC RENAL FAILURE, STAGE 3B (H): ICD-10-CM

## 2022-05-04 DIAGNOSIS — N18.32 STAGE 3B CHRONIC KIDNEY DISEASE (H): ICD-10-CM

## 2022-05-04 DIAGNOSIS — N18.32 ANEMIA OF CHRONIC RENAL FAILURE, STAGE 3B (H): ICD-10-CM

## 2022-05-04 LAB
HCT VFR BLD AUTO: 34 % (ref 40–53)
HGB BLD-MCNC: 10.8 G/DL (ref 13.3–17.7)

## 2022-05-04 PROCEDURE — 85018 HEMOGLOBIN: CPT | Performed by: PATHOLOGY

## 2022-05-04 PROCEDURE — 36415 COLL VENOUS BLD VENIPUNCTURE: CPT | Performed by: PATHOLOGY

## 2022-05-04 PROCEDURE — 85014 HEMATOCRIT: CPT | Performed by: PATHOLOGY

## 2022-05-04 NOTE — TELEPHONE ENCOUNTER
Anemia Management Note  SUBJECTIVE/OBJECTIVE:  Referred by Dr. Eloy Rao on 2021  Primary Diagnosis: Anemia in Chronic Kidney Disease (N18.3, D63.1)   3b  Secondary Diagnosis:  Chronic Kidney Disease, Stage 3 (N18.3)  3b  Liver Tx: 2019  Hgb goal range:  9-10  Epo/Darbo: Aranesp 100mcg every 7 days for Hgb <10.  In Clinic.  *22 ok to schedule every 2 weeks for now.   *22: dose changed to weekly per Dr. Rao  *dose increased to 100mcg starting 22  *dose increased to 60mcg starting with  21 dose  Iron regimen:  NA.  Iron levels stable  Labs : 2022  Recent IVELISSE use, transfusion, IV iron: Aranesp   RX/TX plans :  6/10/2022     No history of stroke, MI and blood clots.  History of hepatocellular carcinoma - s/p TACE 2019 and liver transplant 2019.     Contact:            Ok to leave message regarding scheduling, medical, and billing per consent to communicate dated 10/2/19                              OK to speak with Davi Saunders (friend/POA) regarding scheduling, medical, and billing per consent to communicate dated 10/2/19    Anemia Latest Ref Rng & Units 3/16/2022 3/23/2022 3/30/2022 2022 2022 2022 2022   IVELISSE Dose - 100 mcg - - - - - -   Hemoglobin 13.3 - 17.7 g/dL 9.7(L) 10.1(L) 11.0(L) 11.5(L) 11.7(L) 11.3(L) 10.8(L)   TSAT 15 - 46 % - - - - - 42 -   Ferritin 26 - 388 ng/mL - - - - - 508(H) -   PRBCs - - - - - - - -     BP Readings from Last 3 Encounters:   22 99/64   22 99/58   03/15/22 100/62     Wt Readings from Last 2 Encounters:   22 168 lb 12.8 oz (76.6 kg)   03/15/22 159 lb 4.8 oz (72.3 kg)           ASSESSMENT:  Hgb:Above goal - recommend hold dose  TSat: at goal >30% Ferritin: At goal (>100ng/mL)    PLAN:  Hold Aranesp and RTC for hgb then aranesp if needed in 2 week(s)    Orders needed to be renewed (for next follow-up date) in EPIC: None    Iron labs due:  2022    Plan discussed with:  No call, chart  review      NEXT FOLLOW-UP DATE:  5/18/22    Jie Blum RN   [facility]  [address] [address]   [email or web address]   Office : [phone]  Fax: [phone]

## 2022-05-04 NOTE — PROGRESS NOTES
Chief Complaint   Patient presents with     Allied Health Visit     Aranesp     No injection was given. Hbg was 10.8.    Nereida Steiner CMA

## 2022-05-05 DIAGNOSIS — Z94.4 LIVER TRANSPLANT RECIPIENT (H): ICD-10-CM

## 2022-05-07 RX ORDER — CHOLECALCIFEROL (VITAMIN D3) 50 MCG
1 TABLET ORAL DAILY
Qty: 90 TABLET | Refills: 2 | Status: SHIPPED | OUTPATIENT
Start: 2022-05-07 | End: 2023-03-10

## 2022-05-07 NOTE — TELEPHONE ENCOUNTER
Last Clinic Visit: 12/7/2021  [facility] *Established care with Dr Tavarez (staffed by Dr Romero)

## 2022-05-11 ASSESSMENT — ENCOUNTER SYMPTOMS
SINUS PAIN: 0
MUSCLE CRAMPS: 0
EYE PAIN: 1
TROUBLE SWALLOWING: 0
EYE IRRITATION: 1
BRUISES/BLEEDS EASILY: 0
DOUBLE VISION: 0
ARTHRALGIAS: 0
NECK PAIN: 1
STIFFNESS: 1
SORE THROAT: 0
JOINT SWELLING: 0
EYE WATERING: 1
MUSCLE WEAKNESS: 0
BACK PAIN: 1
SMELL DISTURBANCE: 0
SWOLLEN GLANDS: 0
EYE REDNESS: 0
MYALGIAS: 0
NECK MASS: 0
SINUS CONGESTION: 1
TASTE DISTURBANCE: 0
HOARSE VOICE: 0

## 2022-05-12 ENCOUNTER — OFFICE VISIT (OUTPATIENT)
Dept: ENDOCRINOLOGY | Facility: CLINIC | Age: 57
End: 2022-05-12
Payer: MEDICARE

## 2022-05-12 DIAGNOSIS — L60.2 ONYCHAUXIS: ICD-10-CM

## 2022-05-12 DIAGNOSIS — M21.969 TYPE 2 DIABETES MELLITUS WITH DIABETIC FOOT DEFORMITY (H): ICD-10-CM

## 2022-05-12 DIAGNOSIS — E11.69 TYPE 2 DIABETES MELLITUS WITH DIABETIC FOOT DEFORMITY (H): ICD-10-CM

## 2022-05-12 DIAGNOSIS — E11.49 TYPE II OR UNSPECIFIED TYPE DIABETES MELLITUS WITH NEUROLOGICAL MANIFESTATIONS, NOT STATED AS UNCONTROLLED(250.60) (H): Primary | ICD-10-CM

## 2022-05-12 PROCEDURE — 99214 OFFICE O/P EST MOD 30 MIN: CPT | Performed by: PODIATRIST

## 2022-05-12 NOTE — PROGRESS NOTES
Past Medical History:   Diagnosis Date     Anemia 2013     Arthritis      BPH (benign prostatic hyperplasia)      CAD (coronary artery disease) 4/1/2019     Cholelithiasis      Conductive hearing loss 08/16/2017     Depressive disorder 1986    Suffer effects throughout life     Gastroesophageal reflux disease 12/01/2014     HCC (hepatocellular carcinoma) (H) 1/22/2019     History of diabetic retinopathy 07/2018     HTN (hypertension)      HTN (hypertension) 11/20/2019     Hyperlipidemia      Liver cirrhosis secondary to ESTRADA (H)      Liver transplanted (H) 11/11/2019     Portal vein thrombosis 4/11/2019     Type II diabetes mellitus (H)      Patient Active Problem List   Diagnosis     Bipolar affective disorder in remission (H)     Esophageal varices determined by endoscopy (H)     Brow ptosis     Paralytic lagophthalmos of right upper eyelid     Erectile dysfunction due to diseases classified elsewhere     HCC (hepatocellular carcinoma) (H)     Equivocal stress echocardiogram     Type 2 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, unspecified whether long term insulin use (H)     Status post coronary angiogram     CAD (coronary artery disease)     Liver transplant recipient (H)     Status post liver transplantation (H)     Immunosuppressed status (H)     Benign essential hypertension     Malnutrition related to chronic disease (H)     Hypophosphatasia     Chronic kidney disease, stage 3a (H)     Malnutrition (H)     Anxiety     Mild recurrent major depression (H)     Anemia of chronic renal failure, stage 3a (H)     Rekha (H)     History of coronary artery disease     Dyslipidemia     Constipation, unspecified constipation type     Excessive sweating     Stage 3b chronic kidney disease (H)     Anemia of chronic renal failure, stage 3b (H)     NSTEMI (non-ST elevated myocardial infarction) (H)     Chest pain, unspecified type     Past Surgical History:   Procedure  Laterality Date     BYPASS GRAFT ARTERY CORONARY N/A 7/14/2021    Procedure: median sternotomy, on cardiopulmonary bypass, CORONARY ARTERY BYPASS GRAFT (CABG) x2 with left greater saphenous vein endoscopic harvest and left internal mammery artery harvest;  Surgeon: Tom Zapata MD;  Location: [facility]     COLONOSCOPY      2015     COLONOSCOPY N/A 12/6/2019    Procedure: COLONOSCOPY, WITH POLYPECTOMY AND BIOPSY;  Surgeon: Adam Morton MD;  Location: [facility]     CV CENTRAL VENOUS CATHETER PLACEMENT N/A 7/12/2021    Procedure: Central Venous Catheter Placement;  Surgeon: Fermin Polanco MD;  [facility]     CV CORONARY ANGIOGRAM N/A 7/12/2021    Procedure: Coronary Angiogram;  Surgeon: Fermin Polanco MD;  Location:  [facility]     CV HEART CATHETERIZATION WITH POSSIBLE INTERVENTION N/A 2/26/2019    Procedure: CORS;  Surgeon: Jagdish Hoyt MD;  Location:  [facility]     CV INTRA AORTIC BALLOON N/A 7/12/2021    Procedure: Intra Aortic Balloon Pump Insertion;  Surgeon: Fermin Polanco MD;  Location:  [facility]     ESOPHAGOSCOPY, GASTROSCOPY, DUODENOSCOPY (EGD), COMBINED N/A 11/17/2016    Procedure: COMBINED ESOPHAGOSCOPY, GASTROSCOPY, DUODENOSCOPY (EGD);  Surgeon: Santi Rosas MD;  Location: [facility]     ESOPHAGOSCOPY, GASTROSCOPY, DUODENOSCOPY (EGD), COMBINED N/A 11/17/2017    Procedure: COMBINED ESOPHAGOSCOPY, GASTROSCOPY, DUODENOSCOPY (EGD);  EGD;  Surgeon: Santi Rosas MD;  Location: [facility]     ESOPHAGOSCOPY, GASTROSCOPY, DUODENOSCOPY (EGD), COMBINED N/A 12/28/2018    Procedure: EGD;  Surgeon: Santi Rosas MD;  Location:  OR     ESOPHAGOSCOPY, GASTROSCOPY, DUODENOSCOPY (EGD), COMBINED N/A 12/6/2019    Procedure: ESOPHAGOGASTRODUODENOSCOPY, WITH BIOPSY;  Surgeon: Adam Morton MD;  [facility]     ESOPHAGOSCOPY, GASTROSCOPY, DUODENOSCOPY (EGD),  COMBINED N/A 2020    Procedure: ESOPHAGOGASTRODUODENOSCOPY (EGD);  Surgeon: Santi Rosas MD;  Location: [facility]     HEAD & NECK SURGERY      2017 at [facility].      IMPLANT GOLD WEIGHT EYELID Right 2017    Procedure: IMPLANT WEIGHT EYELID;  Right Upper Eyelid Weight, right tarsal strip lower eyelid;  Surgeon: Milana Malave MD;  [facility]: [facility]     IR CHEMO EMBOLIZATION  2019     KNEE SURGERY Left      ORTHOPEDIC SURGERY       PAROTIDECTOMY, RADICAL NECK DISSECTION Right 2017    Procedure: PAROTIDECTOMY, RADICAL NECK DISSECTION;  Right Superfacial Parotidectomy , Facial nerve repair. with [facility] facial nerve monitor.;  Surgeon: Asiya Morgan MD;  Location: [facility]     PICC INSERTION Left 2017    4fr SL BioFlo PICC, 44cm in the L basilic vein w/ tip in the low SVC     RETURN LIVER TRANSPLANT N/A 2019    Procedure: Exploratory laparotomy, hematoma evacuation, abdominal washout;  Surgeon: Александр Ramos MD;  Location: [facility]     TRANSPLANT LIVER RECIPIENT  DONOR N/A 2019    Procedure: TRANSPLANT, LIVER, RECIPIENT,  DONOR;  Surgeon: Александр Ramos MD;  Location: [facility]     VASCULAR SURGERY       Social History     Socioeconomic History     Marital status:      Spouse name: Not on file     Number of children: Not on file     Years of education: Not on file     Highest education level: Bachelor's degree (e.g., BA, AB, BS)   Occupational History     Not on file   Tobacco Use     Smoking status: Former Smoker     Packs/day: 6.00     Years: 30.00     Pack years: 180.00     Types: Cigars     Start date: 2016     Quit date: 10/25/2017     Years since quittin.5     Smokeless tobacco: Former User     Types: Chew     Quit date: 10/31/2017     Tobacco comment: 1 tin per week   Substance and Sexual Activity     Alcohol use: No     Alcohol/week: 0.0 standard drinks     Comment: quit 1996     Drug use: No     Sexual activity: Not Currently      Partners: Female     Birth control/protection: Condom   Other Topics Concern     Parent/sibling w/ CABG, MI or angioplasty before 65F 55M? Yes   Social History Narrative    Prior [facility]     Social Determinants of Health     Financial Resource Strain: Low Risk      Difficulty of Paying Living Expenses: Not very hard   Food Insecurity: No Food Insecurity     Worried About Running Out of Food in the Last Year: Never true     Ran Out of Food in the Last Year: Never true   Transportation Needs: No Transportation Needs     Lack of Transportation (Medical): No     Lack of Transportation (Non-Medical): No   Physical Activity: Not on file   Stress: Not on file   Social Connections: Not on file   Intimate Partner Violence: Not At Risk     Fear of Current or Ex-Partner: No     Emotionally Abused: No     Physically Abused: No     Sexually Abused: No   Housing Stability: Not on file     Family History   Problem Relation Age of Onset     Prostate Cancer Maternal Grandfather      Substance Abuse Maternal Grandfather         Alcohol     Colon Cancer Father 60     Pancreatic Cancer Father 60     Prostate Cancer Father      Colorectal Cancer Father      Macular Degeneration Father      Cancer Father      Glaucoma Father      Skin Cancer Father      Colorectal Cancer Maternal Grandmother      Cancer Maternal Grandmother      Substance Abuse Maternal Grandmother         Alcohol     Colorectal Cancer Paternal Grandmother      Cancer Mother      Diabetes Mother          3/2016     Cerebrovascular Disease Mother         Passed away in Feb of this year, 80 years old.     Thyroid Disease Mother      Depression Mother      Asthma Sister         Had since birth     Thyroid Disease Sister      Depression Sister      Liver Disease No family hx of      Melanoma No family hx of      Lab Results   Component Value Date    A1C 6.0 01/10/2022    A1C 6.8 2021    A1C 6.0 2020    A1C 6.3 2020    A1C 6.6  08/08/2018    A1C 6.5 06/09/2017    A1C 7.8 10/25/2016     Last Comprehensive Metabolic Panel:  Sodium   Date Value Ref Range Status   04/20/2022 140 133 - 144 mmol/L Final   06/28/2021 137 133 - 144 mmol/L Final     Potassium   Date Value Ref Range Status   04/20/2022 5.4 (H) 3.4 - 5.3 mmol/L Final   06/28/2021 4.6 3.4 - 5.3 mmol/L Final     Chloride   Date Value Ref Range Status   04/20/2022 107 94 - 109 mmol/L Final   06/28/2021 107 94 - 109 mmol/L Final     Carbon Dioxide   Date Value Ref Range Status   06/28/2021 25 20 - 32 mmol/L Final     Carbon Dioxide (CO2)   Date Value Ref Range Status   04/20/2022 27 20 - 32 mmol/L Final     Anion Gap   Date Value Ref Range Status   04/20/2022 6 3 - 14 mmol/L Final   06/28/2021 5 3 - 14 mmol/L Final     Glucose   Date Value Ref Range Status   04/20/2022 130 (H) 70 - 99 mg/dL Final   06/28/2021 208 (H) 70 - 99 mg/dL Final     Urea Nitrogen   Date Value Ref Range Status   04/20/2022 45 (H) 7 - 30 mg/dL Final   06/28/2021 33 (H) 7 - 30 mg/dL Final     Creatinine   Date Value Ref Range Status   04/20/2022 1.88 (H) 0.66 - 1.25 mg/dL Final   06/28/2021 1.62 (H) 0.66 - 1.25 mg/dL Final     GFR Estimate   Date Value Ref Range Status   04/20/2022 41 (L) >60 mL/min/1.73m2 Final     Comment:     Effective December 21, 2021 eGFRcr in adults is calculated using the 2021 CKD-EPI creatinine equation which includes age and gender (Allie lira al., NEJM, DOI: 10.1056/EDPKmc3447013)   06/28/2021 46 (L) >60 mL/min/[1.73_m2] Final     Comment:     Non  GFR Calc  Starting 12/18/2018, serum creatinine based estimated GFR (eGFR) will be   calculated using the Chronic Kidney Disease Epidemiology Collaboration   (CKD-EPI) equation.       GFR, ESTIMATED POCT   Date Value Ref Range Status   11/26/2021 47 (L) >60 mL/min/1.73m2 Final     Calcium   Date Value Ref Range Status   04/20/2022 9.2 8.5 - 10.1 mg/dL Final   06/28/2021 9.1 8.5 - 10.1 mg/dL Final     Lab Results   Component Value  Date    AST 13 04/06/2022    AST 9 06/28/2021     Lab Results   Component Value Date    ALT 28 04/06/2022    ALT 20 06/28/2021     No results found for: BILICONJ   Lab Results   Component Value Date    BILITOTAL 0.3 04/06/2022    BILITOTAL 0.5 06/28/2021     Lab Results   Component Value Date    ALBUMIN 4.2 04/20/2022    ALBUMIN 4.0 06/28/2021     Lab Results   Component Value Date    PROTTOTAL 7.5 04/06/2022    PROTTOTAL 7.4 06/28/2021      Lab Results   Component Value Date    ALKPHOS 91 04/06/2022    ALKPHOS 98 06/28/2021     Lab Results   Component Value Date    WBC 4.6 04/20/2022    WBC 4.4 06/28/2021     Lab Results   Component Value Date    RBC 3.67 04/20/2022    RBC 2.35 06/28/2021     Lab Results   Component Value Date    HGB 10.8 05/04/2022    HGB 7.3 06/28/2021     Lab Results   Component Value Date    HCT 34.0 05/04/2022    HCT 22.6 06/28/2021     No components found for: MCT  Lab Results   Component Value Date     04/20/2022    MCV 96 06/28/2021     Lab Results   Component Value Date    MCH 31.9 04/20/2022    MCH 31.1 06/28/2021     Lab Results   Component Value Date    MCHC 31.1 04/20/2022    MCHC 32.3 06/28/2021     Lab Results   Component Value Date    RDW 14.1 04/20/2022    RDW 15.1 06/28/2021     Lab Results   Component Value Date     04/20/2022     06/28/2021         Answers for HPI/ROS submitted by the patient on 5/11/2022  General Symptoms: No  Skin Symptoms: No  HENT Symptoms: Yes  EYE SYMPTOMS: Yes  HEART SYMPTOMS: No  LUNG SYMPTOMS: No  INTESTINAL SYMPTOMS: No  URINARY SYMPTOMS: No  REPRODUCTIVE SYMPTOMS: No  SKELETAL SYMPTOMS: Yes  BLOOD SYMPTOMS: Yes  NERVOUS SYSTEM SYMPTOMS: No  MENTAL HEALTH SYMPTOMS: No  Ear pain: No  Ear discharge: Yes  Hearing loss: Yes  Tinnitus: No  Nosebleeds: No  Congestion: Yes  Sinus pain: No  Trouble swallowing: No   Voice hoarseness: No  Mouth sores: No  Sore throat: No  Tooth pain: No  Gum tenderness: Yes  Bleeding gums: No  Change in  taste: No  Change in sense of smell: No  Dry mouth: No  Hearing aid used: No  Neck lump: No  Eye pain: Yes  Vision loss: No  Dry eyes: Yes  Watery eyes: Yes  Eye bulging: No  Double vision: No  Flashing of lights: Yes  Spots: No  Floaters: No  Redness: No  Crossed eyes: No  Tunnel Vision: No  Yellowing of eyes: No  Eye irritation: Yes  Back pain: Yes  Muscle aches: No  Neck pain: Yes  Swollen joints: No  Joint pain: No  Bone pain: No  Muscle cramps: No  Muscle weakness: No  Joint stiffness: Yes  Bone fracture: No  Anemia: Yes  Swollen glands: No  Easy bleeding or bruising: No  Edema or swelling: No        SUBJECTIVE FINDINGS:  A 57-year-old male returns to clinic for foot check.  He relates he is doing okay.  Relates he has numbness, tingling and neuropathy in his feet.  Relates no ulcers or sores since we have seen him last.  Relates he needs his toenails trimmed.  No other specific relieving or aggravating factors.  He does have diabetic shoes that he wears.       OBJECTIVE FINDINGS:  DP and PT 2/4 bilaterally.  He has dorsally contracted digits 2-5 bilaterally.  He has incurvated nails with minimal thickening and dystrophy 1-5 bilaterally to differing degrees.  There is no erythema, no drainage, no odor, no calor bilaterally.  There are no gross tendon voids bilaterally.  He has a laterally deviated hallux bilaterally.       ASSESSMENT/PLAN:  Onychauxis bilaterally.  He is diabetic with peripheral neuropathy.  His protective sensation is intact.  Diagnosis and treatment options discussed with him.  All the nails were reduced bilaterally upon consent.  Continue Diabetic shoes.  He will return to clinic and see me in about 3 months.  Previous notes reviewed.           Moderate level of medical decision making.

## 2022-05-12 NOTE — LETTER
5/12/2022       RE: Frandy Workman  [address] [facility] [address]     Dear Colleague,    Thank you for referring your patient, Frandy Workman, to the [facility] ENDO[facility]IN[facility] [place] at [facility]. Please see a copy of my visit note below.    Past Medical History:   Diagnosis Date     Anemia 2013     Arthritis      BPH (benign prostatic hyperplasia)      CAD (coronary artery disease) 4/1/2019     Cholelithiasis      Conductive hearing loss 08/16/2017     Depressive disorder 1986    Suffer effects throughout life     Gastroesophageal reflux disease 12/01/2014     HCC (hepatocellular carcinoma) (H) 1/22/2019     History of diabetic retinopathy 07/2018     HTN (hypertension)      HTN (hypertension) 11/20/2019     Hyperlipidemia      Liver cirrhosis secondary to ESTRADA (H)      Liver transplanted (H) 11/11/2019     Portal vein thrombosis 4/11/2019     Type II diabetes mellitus (H)      Patient Active Problem List   Diagnosis     Bipolar affective disorder in remission (H)     Esophageal varices determined by endoscopy (H)     Brow ptosis     Paralytic lagophthalmos of right upper eyelid     Erectile dysfunction due to diseases classified elsewhere     HCC (hepatocellular carcinoma) (H)     Equivocal stress echocardiogram     Type 2 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, unspecified whether long term insulin use (H)     Status post coronary angiogram     CAD (coronary artery disease)     Liver transplant recipient (H)     Status post liver transplantation (H)     Immunosuppressed status (H)     Benign essential hypertension     Malnutrition related to chronic disease (H)     Hypophosphatasia     Chronic kidney disease, stage 3a (H)     Malnutrition (H)     Anxiety     Mild recurrent major depression (H)     Anemia of chronic renal failure, stage 3a (H)     Rekha (H)     History of coronary  artery disease     Dyslipidemia     Constipation, unspecified constipation type     Excessive sweating     Stage 3b chronic kidney disease (H)     Anemia of chronic renal failure, stage 3b (H)     NSTEMI (non-ST elevated myocardial infarction) (H)     Chest pain, unspecified type     Past Surgical History:   Procedure Laterality Date     BYPASS GRAFT ARTERY CORONARY N/A 7/14/2021    Procedure: median sternotomy, on cardiopulmonary bypass, CORONARY ARTERY BYPASS GRAFT (CABG) x2 with left greater saphenous vein endoscopic harvest and left internal mammery artery harvest;  Surgeon: Tom Zapata MD;  Location: [facility] OR     COLONOSCOPY      2015     COLONOSCOPY N/A 12/6/2019    Procedure: COLONOSCOPY, WITH POLYPECTOMY AND BIOPSY;  Surgeon: Adam Morton MD;  Location: [facility] GI     CV CENTRAL VENOUS CATHETER PLACEMENT N/A 7/12/2021    Procedure: Central Venous Catheter Placement;  Surgeon: Fermin Polanco MD;  Location:  HEART CARDIAC CATH LAB     CV CORONARY ANGIOGRAM N/A 7/12/2021    Procedure: Coronary Angiogram;  Surgeon: Fermin Polanco MD;  Location:  HEART CARDIAC CATH LAB     CV HEART CATHETERIZATION WITH POSSIBLE INTERVENTION N/A 2/26/2019    Procedure: CORS;  Surgeon: Jagdish Hoyt MD;  Location:  HEART CARDIAC CATH LAB     CV INTRA AORTIC BALLOON N/A 7/12/2021    Procedure: Intra Aortic Balloon Pump Insertion;  Surgeon: Fermin Polanco MD;  Location:  HEART CARDIAC CATH LAB     ESOPHAGOSCOPY, GASTROSCOPY, DUODENOSCOPY (EGD), COMBINED N/A 11/17/2016    Procedure: COMBINED ESOPHAGOSCOPY, GASTROSCOPY, DUODENOSCOPY (EGD);  Surgeon: aSnti Rosas MD;  Location:  GI     ESOPHAGOSCOPY, GASTROSCOPY, DUODENOSCOPY (EGD), COMBINED N/A 11/17/2017    Procedure: COMBINED ESOPHAGOSCOPY, GASTROSCOPY, DUODENOSCOPY (EGD);  EGD;  Surgeon: Santi Roass MD;  Location:  GI     ESOPHAGOSCOPY, GASTROSCOPY, DUODENOSCOPY (EGD), COMBINED N/A  2018    Procedure: EGD;  Surgeon: Santi Rosas MD;  [facility]: [facility]     ESOPHAGOSCOPY, GASTROSCOPY, DUODENOSCOPY (EGD), COMBINED N/A 2019    Procedure: ESOPHAGOGASTRODUODENOSCOPY, WITH BIOPSY;  Surgeon: Adam Morton MD;  Location:  GI     ESOPHAGOSCOPY, GASTROSCOPY, DUODENOSCOPY (EGD), COMBINED N/A 2020    Procedure: ESOPHAGOGASTRODUODENOSCOPY (EGD);  Surgeon: Santi Rosas MD;  Location:  GI     HEAD & NECK SURGERY      2017 at [facility].      IMPLANT GOLD WEIGHT EYELID Right 2017    Procedure: IMPLANT WEIGHT EYELID;  Right Upper Eyelid Weight, right tarsal strip lower eyelid;  Surgeon: Milana Malave MD;  Location: [facility] OR     IR CHEMO EMBOLIZATION  2019     KNEE SURGERY Left      ORTHOPEDIC SURGERY       PAROTIDECTOMY, RADICAL NECK DISSECTION Right 2017    Procedure: PAROTIDECTOMY, RADICAL NECK DISSECTION;  Right Superfacial Parotidectomy , Facial nerve repair. with [facility] facial nerve monitor.;  Surgeon: Asiya Morgan MD;  Location: [facility] OR     PICC INSERTION Left 2017    4fr SL BioFlo PICC, 44cm in the L basilic vein w/ tip in the low SVC     RETURN LIVER TRANSPLANT N/A 2019    Procedure: Exploratory laparotomy, hematoma evacuation, abdominal washout;  Surgeon: Александр Ramos MD;  Location: [facility] OR     TRANSPLANT LIVER RECIPIENT  DONOR N/A 2019    Procedure: TRANSPLANT, LIVER, RECIPIENT,  DONOR;  Surgeon: Александр Ramos MD;  Location: [facility] OR     VASCULAR SURGERY       Social History     Socioeconomic History     Marital status:      Spouse name: Not on file     Number of children: Not on file     Years of education: Not on file     Highest education level: Bachelor's degree (e.g., BA, AB, BS)   Occupational History     Not on file   Tobacco Use     Smoking status: Former Smoker     Packs/day: 6.00     Years: 30.00     Pack years: 180.00     Types: Cigars     Start date: 2016     Quit date: 10/25/2017      Years since quittin.5     Smokeless tobacco: Former User     Types: Chew     Quit date: 10/31/2017     Tobacco comment: 1 tin per week   Substance and Sexual Activity     Alcohol use: No     Alcohol/week: 0.0 standard drinks     Comment: quit 1996     Drug use: No     Sexual activity: Not Currently     Partners: Female     Birth control/protection: Condom   Other Topics Concern     Parent/sibling w/ CABG, MI or angioplasty before 65F 55M? Yes   Social History Narrative    Prior [facility], [facility]     Social Determinants of Health     Financial Resource Strain: Low Risk      Difficulty of Paying Living Expenses: Not very hard   Food Insecurity: No Food Insecurity     Worried About Running Out of Food in the Last Year: Never true     Ran Out of Food in the Last Year: Never true   Transportation Needs: No Transportation Needs     Lack of Transportation (Medical): No     Lack of Transportation (Non-Medical): No   Physical Activity: Not on file   Stress: Not on file   Social Connections: Not on file   Intimate Partner Violence: Not At Risk     Fear of Current or Ex-Partner: No     Emotionally Abused: No     Physically Abused: No     Sexually Abused: No   Housing Stability: Not on file     Family History   Problem Relation Age of Onset     Prostate Cancer Maternal Grandfather      Substance Abuse Maternal Grandfather         Alcohol     Colon Cancer Father 60     Pancreatic Cancer Father 60     Prostate Cancer Father      Colorectal Cancer Father      Macular Degeneration Father      Cancer Father      Glaucoma Father      Skin Cancer Father      Colorectal Cancer Maternal Grandmother      Cancer Maternal Grandmother      Substance Abuse Maternal Grandmother         Alcohol     Colorectal Cancer Paternal Grandmother      Cancer Mother      Diabetes Mother          3/2016     Cerebrovascular Disease Mother         Passed away in Feb of this year, 80 years old.     Thyroid Disease Mother       Depression Mother      Asthma Sister         Had since birth     Thyroid Disease Sister      Depression Sister      Liver Disease No family hx of      Melanoma No family hx of      Lab Results   Component Value Date    A1C 6.0 01/10/2022    A1C 6.8 07/13/2021    A1C 6.0 12/30/2020    A1C 6.3 06/13/2020    A1C 6.6 08/08/2018    A1C 6.5 06/09/2017    A1C 7.8 10/25/2016     Last Comprehensive Metabolic Panel:  Sodium   Date Value Ref Range Status   04/20/2022 140 133 - 144 mmol/L Final   06/28/2021 137 133 - 144 mmol/L Final     Potassium   Date Value Ref Range Status   04/20/2022 5.4 (H) 3.4 - 5.3 mmol/L Final   06/28/2021 4.6 3.4 - 5.3 mmol/L Final     Chloride   Date Value Ref Range Status   04/20/2022 107 94 - 109 mmol/L Final   06/28/2021 107 94 - 109 mmol/L Final     Carbon Dioxide   Date Value Ref Range Status   06/28/2021 25 20 - 32 mmol/L Final     Carbon Dioxide (CO2)   Date Value Ref Range Status   04/20/2022 27 20 - 32 mmol/L Final     Anion Gap   Date Value Ref Range Status   04/20/2022 6 3 - 14 mmol/L Final   06/28/2021 5 3 - 14 mmol/L Final     Glucose   Date Value Ref Range Status   04/20/2022 130 (H) 70 - 99 mg/dL Final   06/28/2021 208 (H) 70 - 99 mg/dL Final     Urea Nitrogen   Date Value Ref Range Status   04/20/2022 45 (H) 7 - 30 mg/dL Final   06/28/2021 33 (H) 7 - 30 mg/dL Final     Creatinine   Date Value Ref Range Status   04/20/2022 1.88 (H) 0.66 - 1.25 mg/dL Final   06/28/2021 1.62 (H) 0.66 - 1.25 mg/dL Final     GFR Estimate   Date Value Ref Range Status   04/20/2022 41 (L) >60 mL/min/1.73m2 Final     Comment:     Effective December 21, 2021 eGFRcr in adults is calculated using the 2021 CKD-EPI creatinine equation which includes age and gender (Allie et al., NEJ, DOI: 10.1056/KQATzw8683481)   06/28/2021 46 (L) >60 mL/min/[1.73_m2] Final     Comment:     Non  GFR Calc  Starting 12/18/2018, serum creatinine based estimated GFR (eGFR) will be   calculated using the Chronic  Kidney Disease Epidemiology Collaboration   (CKD-EPI) equation.       GFR, ESTIMATED POCT   Date Value Ref Range Status   11/26/2021 47 (L) >60 mL/min/1.73m2 Final     Calcium   Date Value Ref Range Status   04/20/2022 9.2 8.5 - 10.1 mg/dL Final   06/28/2021 9.1 8.5 - 10.1 mg/dL Final     Lab Results   Component Value Date    AST 13 04/06/2022    AST 9 06/28/2021     Lab Results   Component Value Date    ALT 28 04/06/2022    ALT 20 06/28/2021     No results found for: BILICONJ   Lab Results   Component Value Date    BILITOTAL 0.3 04/06/2022    BILITOTAL 0.5 06/28/2021     Lab Results   Component Value Date    ALBUMIN 4.2 04/20/2022    ALBUMIN 4.0 06/28/2021     Lab Results   Component Value Date    PROTTOTAL 7.5 04/06/2022    PROTTOTAL 7.4 06/28/2021      Lab Results   Component Value Date    ALKPHOS 91 04/06/2022    ALKPHOS 98 06/28/2021     Lab Results   Component Value Date    WBC 4.6 04/20/2022    WBC 4.4 06/28/2021     Lab Results   Component Value Date    RBC 3.67 04/20/2022    RBC 2.35 06/28/2021     Lab Results   Component Value Date    HGB 10.8 05/04/2022    HGB 7.3 06/28/2021     Lab Results   Component Value Date    HCT 34.0 05/04/2022    HCT 22.6 06/28/2021     No components found for: MCT  Lab Results   Component Value Date     04/20/2022    MCV 96 06/28/2021     Lab Results   Component Value Date    MCH 31.9 04/20/2022    MCH 31.1 06/28/2021     Lab Results   Component Value Date    MCHC 31.1 04/20/2022    MCHC 32.3 06/28/2021     Lab Results   Component Value Date    RDW 14.1 04/20/2022    RDW 15.1 06/28/2021     Lab Results   Component Value Date     04/20/2022     06/28/2021         Answers for HPI/ROS submitted by the patient on 5/11/2022  General Symptoms: No  Skin Symptoms: No  HENT Symptoms: Yes  EYE SYMPTOMS: Yes  HEART SYMPTOMS: No  LUNG SYMPTOMS: No  INTESTINAL SYMPTOMS: No  URINARY SYMPTOMS: No  REPRODUCTIVE SYMPTOMS: No  SKELETAL SYMPTOMS: Yes  BLOOD SYMPTOMS:  Yes  NERVOUS SYSTEM SYMPTOMS: No  MENTAL HEALTH SYMPTOMS: No  Ear pain: No  Ear discharge: Yes  Hearing loss: Yes  Tinnitus: No  Nosebleeds: No  Congestion: Yes  Sinus pain: No  Trouble swallowing: No   Voice hoarseness: No  Mouth sores: No  Sore throat: No  Tooth pain: No  Gum tenderness: Yes  Bleeding gums: No  Change in taste: No  Change in sense of smell: No  Dry mouth: No  Hearing aid used: No  Neck lump: No  Eye pain: Yes  Vision loss: No  Dry eyes: Yes  Watery eyes: Yes  Eye bulging: No  Double vision: No  Flashing of lights: Yes  Spots: No  Floaters: No  Redness: No  Crossed eyes: No  Tunnel Vision: No  Yellowing of eyes: No  Eye irritation: Yes  Back pain: Yes  Muscle aches: No  Neck pain: Yes  Swollen joints: No  Joint pain: No  Bone pain: No  Muscle cramps: No  Muscle weakness: No  Joint stiffness: Yes  Bone fracture: No  Anemia: Yes  Swollen glands: No  Easy bleeding or bruising: No  Edema or swelling: No        SUBJECTIVE FINDINGS:  A 57-year-old male returns to clinic for foot check.  He relates he is doing okay.  Relates he has numbness, tingling and neuropathy in his feet.  Relates no ulcers or sores since we have seen him last.  Relates he needs his toenails trimmed.  No other specific relieving or aggravating factors.  He does have diabetic shoes that he wears.       OBJECTIVE FINDINGS:  DP and PT 2/4 bilaterally.  He has dorsally contracted digits 2-5 bilaterally.  He has incurvated nails with minimal thickening and dystrophy 1-5 bilaterally to differing degrees.  There is no erythema, no drainage, no odor, no calor bilaterally.  There are no gross tendon voids bilaterally.  He has a laterally deviated hallux bilaterally.       ASSESSMENT/PLAN:  Onychauxis bilaterally.  He is diabetic with peripheral neuropathy.  His protective sensation is intact.  Diagnosis and treatment options discussed with him.  All the nails were reduced bilaterally upon consent.  Continue Diabetic shoes.  He will return to  clinic and see me in about 3 months.  Previous notes reviewed.           Moderate level of medical decision making.        Again, thank you for allowing me to participate in the care of your patient.      Sincerely,    Lamin Gonzalez DPM

## 2022-05-12 NOTE — LETTER
5/12/2022      RE: Frandy Workman  [address] [facility] [address]       Past Medical History:   Diagnosis Date     Anemia 2013     Arthritis      BPH (benign prostatic hyperplasia)      CAD (coronary artery disease) 4/1/2019     Cholelithiasis      Conductive hearing loss 08/16/2017     Depressive disorder 1986    Suffer effects throughout life     Gastroesophageal reflux disease 12/01/2014     HCC (hepatocellular carcinoma) (H) 1/22/2019     History of diabetic retinopathy 07/2018     HTN (hypertension)      HTN (hypertension) 11/20/2019     Hyperlipidemia      Liver cirrhosis secondary to ESTRADA (H)      Liver transplanted (H) 11/11/2019     Portal vein thrombosis 4/11/2019     Type II diabetes mellitus (H)      Patient Active Problem List   Diagnosis     Bipolar affective disorder in remission (H)     Esophageal varices determined by endoscopy (H)     Brow ptosis     Paralytic lagophthalmos of right upper eyelid     Erectile dysfunction due to diseases classified elsewhere     HCC (hepatocellular carcinoma) (H)     Equivocal stress echocardiogram     Type 2 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, unspecified whether long term insulin use (H)     Status post coronary angiogram     CAD (coronary artery disease)     Liver transplant recipient (H)     Status post liver transplantation (H)     Immunosuppressed status (H)     Benign essential hypertension     Malnutrition related to chronic disease (H)     Hypophosphatasia     Chronic kidney disease, stage 3a (H)     Malnutrition (H)     Anxiety     Mild recurrent major depression (H)     Anemia of chronic renal failure, stage 3a (H)     Rekha (H)     History of coronary artery disease     Dyslipidemia     Constipation, unspecified constipation type     Excessive sweating     Stage 3b chronic kidney disease (H)     Anemia of chronic renal failure, stage 3b (H)     NSTEMI (non-ST elevated myocardial  infarction) (H)     Chest pain, unspecified type     Past Surgical History:   Procedure Laterality Date     BYPASS GRAFT ARTERY CORONARY N/A 7/14/2021    Procedure: median sternotomy, on cardiopulmonary bypass, CORONARY ARTERY BYPASS GRAFT (CABG) x2 with left greater saphenous vein endoscopic harvest and left internal mammery artery harvest;  Surgeon: Tom Zapata MD;  Location: [facility]     COLONOSCOPY      2015     COLONOSCOPY N/A 12/6/2019    Procedure: COLONOSCOPY, WITH POLYPECTOMY AND BIOPSY;  Surgeon: Adam Morton MD;  Location: [facility]     CV CENTRAL VENOUS CATHETER PLACEMENT N/A 7/12/2021    Procedure: Central Venous Catheter Placement;  Surgeon: Fermin Polanco MD;  Location:  [facility]ATH [facility]     CV CORONARY ANGIOGRAM N/A 7/12/2021    Procedure: Coronary Angiogram;  Surgeon: Fermin Polanco MD;  Location:  [facility]     CV HEART CATHETERIZATION WITH POSSIBLE INTERVENTION N/A 2/26/2019    Procedure: CORS;  Surgeon: Jagdish Hoyt MD;  Location:  [facility] CATH [facility]     CV INTRA AORTIC BALLOON N/A 7/12/2021    Procedure: Intra Aortic Balloon Pump Insertion;  Surgeon: Fermin Polanco MD;  Location:  [facility] CATH LAB     ESOPHAGOSCOPY, GASTROSCOPY, DUODENOSCOPY (EGD), COMBINED N/A 11/17/2016    Procedure: COMBINED ESOPHAGOSCOPY, GASTROSCOPY, DUODENOSCOPY (EGD);  Surgeon: Santi Rosas MD;  [facility]     ESOPHAGOSCOPY, GASTROSCOPY, DUODENOSCOPY (EGD), COMBINED N/A 11/17/2017    Procedure: COMBINED ESOPHAGOSCOPY, GASTROSCOPY, DUODENOSCOPY (EGD);  EGD;  Surgeon: Santi Rosas MD;  Location:  GI     ESOPHAGOSCOPY, GASTROSCOPY, DUODENOSCOPY (EGD), COMBINED N/A 12/28/2018    Procedure: EGD;  Surgeon: Santi Rosas MD;  Location:  OR     ESOPHAGOSCOPY, GASTROSCOPY, DUODENOSCOPY (EGD), COMBINED N/A 12/6/2019    Procedure: ESOPHAGOGASTRODUODENOSCOPY, WITH BIOPSY;  Surgeon:  Adam Morton MD;  Location:  GI     ESOPHAGOSCOPY, GASTROSCOPY, DUODENOSCOPY (EGD), COMBINED N/A 2020    Procedure: ESOPHAGOGASTRODUODENOSCOPY (EGD);  Surgeon: Santi Rosas MD;  [facility]     HEAD & NECK SURGERY      2017 at [facility].      IMPLANT GOLD WEIGHT EYELID Right 2017    Procedure: IMPLANT WEIGHT EYELID;  Right Upper Eyelid Weight, right tarsal strip lower eyelid;  Surgeon: Milana Malave MD;  Location: [facility]     IR CHEMO EMBOLIZATION  2019     KNEE SURGERY Left      ORTHOPEDIC SURGERY       PAROTIDECTOMY, RADICAL NECK DISSECTION Right 2017    Procedure: PAROTIDECTOMY, RADICAL NECK DISSECTION;  Right Superfacial Parotidectomy , Facial nerve repair. with [facility] facial nerve monitor.;  Surgeon: Asiya Morgan MD;  Location: [facility] OR     PICC INSERTION Left 2017    4fr SL BioFlo PICC, 44cm in the L basilic vein w/ tip in the low SVC     RETURN LIVER TRANSPLANT N/A 2019    Procedure: Exploratory laparotomy, hematoma evacuation, abdominal washout;  Surgeon: Александр Ramos MD;  Location: [facility]     TRANSPLANT LIVER RECIPIENT  DONOR N/A 2019    Procedure: TRANSPLANT, LIVER, RECIPIENT,  DONOR;  Surgeon: Александр Ramos MD;  Location: [facility] OR     VASCULAR SURGERY       Social History     Socioeconomic History     Marital status:      Spouse name: Not on file     Number of children: Not on file     Years of education: Not on file     Highest education level: Bachelor's degree (e.g., BA, AB, BS)   Occupational History     Not on file   Tobacco Use     Smoking status: Former Smoker     Packs/day: 6.00     Years: 30.00     Pack years: 180.00     Types: Cigars     Start date: 2016     Quit date: 10/25/2017     Years since quittin.5     Smokeless tobacco: Former User     Types: Chew     Quit date: 10/31/2017     Tobacco comment: 1 tin per week   Substance and Sexual Activity     Alcohol use: No     Alcohol/week: 0.0 standard  drinks     Comment: quit 1996     Drug use: No     Sexual activity: Not Currently     Partners: Female     Birth control/protection: Condom   Other Topics Concern     Parent/sibling w/ CABG, MI or angioplasty before 65F 55M? Yes   Social History Narrative    Prior [facility]     Social Determinants of Health     Financial Resource Strain: Low Risk      Difficulty of Paying Living Expenses: Not very hard   Food Insecurity: No Food Insecurity     Worried About Running Out of Food in the Last Year: Never true     Ran Out of Food in the Last Year: Never true   Transportation Needs: No Transportation Needs     Lack of Transportation (Medical): No     Lack of Transportation (Non-Medical): No   Physical Activity: Not on file   Stress: Not on file   Social Connections: Not on file   Intimate Partner Violence: Not At Risk     Fear of Current or Ex-Partner: No     Emotionally Abused: No     Physically Abused: No     Sexually Abused: No   Housing Stability: Not on file     Family History   Problem Relation Age of Onset     Prostate Cancer Maternal Grandfather      Substance Abuse Maternal Grandfather         Alcohol     Colon Cancer Father 60     Pancreatic Cancer Father 60     Prostate Cancer Father      Colorectal Cancer Father      Macular Degeneration Father      Cancer Father      Glaucoma Father      Skin Cancer Father      Colorectal Cancer Maternal Grandmother      Cancer Maternal Grandmother      Substance Abuse Maternal Grandmother         Alcohol     Colorectal Cancer Paternal Grandmother      Cancer Mother      Diabetes Mother          3/2016     Cerebrovascular Disease Mother         Passed away in Feb of this year, 80 years old.     Thyroid Disease Mother      Depression Mother      Asthma Sister         Had since birth     Thyroid Disease Sister      Depression Sister      Liver Disease No family hx of      Melanoma No family hx of      Lab Results   Component Value Date    A1C 6.0  01/10/2022    A1C 6.8 07/13/2021    A1C 6.0 12/30/2020    A1C 6.3 06/13/2020    A1C 6.6 08/08/2018    A1C 6.5 06/09/2017    A1C 7.8 10/25/2016     Last Comprehensive Metabolic Panel:  Sodium   Date Value Ref Range Status   04/20/2022 140 133 - 144 mmol/L Final   06/28/2021 137 133 - 144 mmol/L Final     Potassium   Date Value Ref Range Status   04/20/2022 5.4 (H) 3.4 - 5.3 mmol/L Final   06/28/2021 4.6 3.4 - 5.3 mmol/L Final     Chloride   Date Value Ref Range Status   04/20/2022 107 94 - 109 mmol/L Final   06/28/2021 107 94 - 109 mmol/L Final     Carbon Dioxide   Date Value Ref Range Status   06/28/2021 25 20 - 32 mmol/L Final     Carbon Dioxide (CO2)   Date Value Ref Range Status   04/20/2022 27 20 - 32 mmol/L Final     Anion Gap   Date Value Ref Range Status   04/20/2022 6 3 - 14 mmol/L Final   06/28/2021 5 3 - 14 mmol/L Final     Glucose   Date Value Ref Range Status   04/20/2022 130 (H) 70 - 99 mg/dL Final   06/28/2021 208 (H) 70 - 99 mg/dL Final     Urea Nitrogen   Date Value Ref Range Status   04/20/2022 45 (H) 7 - 30 mg/dL Final   06/28/2021 33 (H) 7 - 30 mg/dL Final     Creatinine   Date Value Ref Range Status   04/20/2022 1.88 (H) 0.66 - 1.25 mg/dL Final   06/28/2021 1.62 (H) 0.66 - 1.25 mg/dL Final     GFR Estimate   Date Value Ref Range Status   04/20/2022 41 (L) >60 mL/min/1.73m2 Final     Comment:     Effective December 21, 2021 eGFRcr in adults is calculated using the 2021 CKD-EPI creatinine equation which includes age and gender (Allie lira al., NEJM, DOI: 10.1056/MIHJvu3941145)   06/28/2021 46 (L) >60 mL/min/[1.73_m2] Final     Comment:     Non  GFR Calc  Starting 12/18/2018, serum creatinine based estimated GFR (eGFR) will be   calculated using the Chronic Kidney Disease Epidemiology Collaboration   (CKD-EPI) equation.       GFR, ESTIMATED POCT   Date Value Ref Range Status   11/26/2021 47 (L) >60 mL/min/1.73m2 Final     Calcium   Date Value Ref Range Status   04/20/2022 9.2 8.5 -  10.1 mg/dL Final   06/28/2021 9.1 8.5 - 10.1 mg/dL Final     Lab Results   Component Value Date    AST 13 04/06/2022    AST 9 06/28/2021     Lab Results   Component Value Date    ALT 28 04/06/2022    ALT 20 06/28/2021     No results found for: BILICONJ   Lab Results   Component Value Date    BILITOTAL 0.3 04/06/2022    BILITOTAL 0.5 06/28/2021     Lab Results   Component Value Date    ALBUMIN 4.2 04/20/2022    ALBUMIN 4.0 06/28/2021     Lab Results   Component Value Date    PROTTOTAL 7.5 04/06/2022    PROTTOTAL 7.4 06/28/2021      Lab Results   Component Value Date    ALKPHOS 91 04/06/2022    ALKPHOS 98 06/28/2021     Lab Results   Component Value Date    WBC 4.6 04/20/2022    WBC 4.4 06/28/2021     Lab Results   Component Value Date    RBC 3.67 04/20/2022    RBC 2.35 06/28/2021     Lab Results   Component Value Date    HGB 10.8 05/04/2022    HGB 7.3 06/28/2021     Lab Results   Component Value Date    HCT 34.0 05/04/2022    HCT 22.6 06/28/2021     No components found for: MCT  Lab Results   Component Value Date     04/20/2022    MCV 96 06/28/2021     Lab Results   Component Value Date    MCH 31.9 04/20/2022    MCH 31.1 06/28/2021     Lab Results   Component Value Date    MCHC 31.1 04/20/2022    MCHC 32.3 06/28/2021     Lab Results   Component Value Date    RDW 14.1 04/20/2022    RDW 15.1 06/28/2021     Lab Results   Component Value Date     04/20/2022     06/28/2021         Answers for HPI/ROS submitted by the patient on 5/11/2022  General Symptoms: No  Skin Symptoms: No  HENT Symptoms: Yes  EYE SYMPTOMS: Yes  HEART SYMPTOMS: No  LUNG SYMPTOMS: No  INTESTINAL SYMPTOMS: No  URINARY SYMPTOMS: No  REPRODUCTIVE SYMPTOMS: No  SKELETAL SYMPTOMS: Yes  BLOOD SYMPTOMS: Yes  NERVOUS SYSTEM SYMPTOMS: No  MENTAL HEALTH SYMPTOMS: No  Ear pain: No  Ear discharge: Yes  Hearing loss: Yes  Tinnitus: No  Nosebleeds: No  Congestion: Yes  Sinus pain: No  Trouble swallowing: No   Voice hoarseness: No  Mouth sores:  No  Sore throat: No  Tooth pain: No  Gum tenderness: Yes  Bleeding gums: No  Change in taste: No  Change in sense of smell: No  Dry mouth: No  Hearing aid used: No  Neck lump: No  Eye pain: Yes  Vision loss: No  Dry eyes: Yes  Watery eyes: Yes  Eye bulging: No  Double vision: No  Flashing of lights: Yes  Spots: No  Floaters: No  Redness: No  Crossed eyes: No  Tunnel Vision: No  Yellowing of eyes: No  Eye irritation: Yes  Back pain: Yes  Muscle aches: No  Neck pain: Yes  Swollen joints: No  Joint pain: No  Bone pain: No  Muscle cramps: No  Muscle weakness: No  Joint stiffness: Yes  Bone fracture: No  Anemia: Yes  Swollen glands: No  Easy bleeding or bruising: No  Edema or swelling: No        SUBJECTIVE FINDINGS:  A 57-year-old male returns to clinic for foot check.  He relates he is doing okay.  Relates he has numbness, tingling and neuropathy in his feet.  Relates no ulcers or sores since we have seen him last.  Relates he needs his toenails trimmed.  No other specific relieving or aggravating factors.  He does have diabetic shoes that he wears.       OBJECTIVE FINDINGS:  DP and PT 2/4 bilaterally.  He has dorsally contracted digits 2-5 bilaterally.  He has incurvated nails with minimal thickening and dystrophy 1-5 bilaterally to differing degrees.  There is no erythema, no drainage, no odor, no calor bilaterally.  There are no gross tendon voids bilaterally.  He has a laterally deviated hallux bilaterally.       ASSESSMENT/PLAN:  Onychauxis bilaterally.  He is diabetic with peripheral neuropathy.  His protective sensation is intact.  Diagnosis and treatment options discussed with him.  All the nails were reduced bilaterally upon consent.  Continue Diabetic shoes.  He will return to clinic and see me in about 3 months.  Previous notes reviewed.       Moderate level of medical decision making.        Lamin Gonzalez DPM

## 2022-05-16 ENCOUNTER — HEALTH MAINTENANCE LETTER (OUTPATIENT)
Age: 57
End: 2022-05-16

## 2022-05-17 ENCOUNTER — VIRTUAL VISIT (OUTPATIENT)
Dept: BEHAVIORAL HEALTH | Facility: CLINIC | Age: 57
End: 2022-05-17
Payer: MEDICARE

## 2022-05-17 DIAGNOSIS — F31.31 BIPOLAR 1 DISORDER, DEPRESSED, MILD (H): Primary | ICD-10-CM

## 2022-05-17 PROCEDURE — 90832 PSYTX W PT 30 MINUTES: CPT | Mod: 95 | Performed by: PSYCHOLOGIST

## 2022-05-17 ASSESSMENT — PATIENT HEALTH QUESTIONNAIRE - PHQ9
SUM OF ALL RESPONSES TO PHQ QUESTIONS 1-9: 5
SUM OF ALL RESPONSES TO PHQ QUESTIONS 1-9: 5
10. IF YOU CHECKED OFF ANY PROBLEMS, HOW DIFFICULT HAVE THESE PROBLEMS MADE IT FOR YOU TO DO YOUR WORK, TAKE CARE OF THINGS AT HOME, OR GET ALONG WITH OTHER PEOPLE: EXTREMELY DIFFICULT

## 2022-05-17 NOTE — PROGRESS NOTES
[facility]: Integrated Behavioral Health  May 17, 2022      Behavioral Health Clinician Progress Note    Patient Name: Frandy Workman           Service Type: Video visit      Service Location:  Video visit      Session Start Time: 2:15  Session End Time:  2:43      Session Length: 16 - 37      Attendees: Patient    Visit Activities (Refresh list every visit): [facility] Only     Service Modality:  Video Visit:      Provider verified identity through the following two step process.  Patient provided:  Patient photo and Patient is known previously to provider    Telemedicine Visit: The patient's condition can be safely assessed and treated via synchronous audio and visual telemedicine encounter.      Reason for Telemedicine Visit: Patient has requested telehealth visit and Services only offered telehealth    Originating Site (Patient Location): Patient's home    Distant Site (Provider Location): Provider Remote Setting- Home Office    Consent:  The patient/guardian has verbally consented to: the potential risks and benefits of telemedicine (video visit) versus in person care; bill my insurance or make self-payment for services provided; and responsibility for payment of non-covered services.     Patient would like the video invitation sent by:  Send to e-mail at: [email or web address]    Mode of Communication:  Video Conference via [facility]    As the provider I attest to compliance with applicable laws and regulations related to telemedicine.      Diagnostic Assessment Date:  12/03/2020  Treatment Plan Review Date:  6/1/2022  See Flowsheets for today's PHQ-9 and LIZZETTE-7 results  Previous PHQ-9:   PHQ-9 SCORE 2/8/2022 3/21/2022 5/17/2022   PHQ-9 Total Score MyChart 4 (Minimal depression) 6 (Mild depression) 5 (Mild depression)   PHQ-9 Total Score 4 6 5     Previous LIZZETTE-7:   LIZZETTE-7 SCORE 12/29/2020 4/7/2021 9/30/2021   Total Score 8 (mild anxiety) 9 (mild anxiety) -   Total Score 8 9 10        Extended Session (60+ minutes): No  Interactive Complexity: No  Crisis: No    Treatment Objective(s) Addressed in This Session:  Target Behavior(s): disease management/lifestyle changes  related to adaptive approaches to managing anxious distress and mood difficulties    Increase interest and engagement in doing enjoyable/mastery activities    Current Stressors / Issues:  [facility] met with Miller today for a follow-up. Miller states he continues to make some progress with his health and related energy levels. He provided updates about his progress today and what he is working on. States he is still experiencing anxiety dreams, centering on ideas like not finishing a project on time. He noted still intending to try journaling before bed to help with this and we also talked about trying to get up for a few moments then returning to bed if the thoughts/dream experiences persist. We also explored his motivation to go back to [employer] for added support and engagement in his spiritual life. Brief MI around this idea. Additionally provided support and affirmed the steps Miller has taken toward adaptive change. Reviewed his next steps of working on sleep more, going to [employer], and getting more movement/steps in each day for overall health.     Answers for HPI/ROS submitted by the patient on 3/21/2022  If you checked off any problems, how difficult have these problems made it for you to do your work, take care of things at home, or get along with other people?: Not difficult at all  PHQ9 TOTAL SCORE: 6      Progress on Treatment Objective(s) / Homework:  Stable - MAINTENANCE (Working to maintain change, with risk of relapse); Intervened by continuing to positively reinforce healthy behavior choice       Solution-Focused Therapy    Explore patterns in patient's relationships and discuss options for new behaviors.    Explore patterns in patient's actions and choices and discuss options for new behaviors.    Supportive  Psychotherapy      Answers for HPI/ROS submitted by the patient on 2/8/2022  If you checked off any problems, how difficult have these problems made it for you to do your work, take care of things at home, or get along with other people?: Somewhat difficult  PHQ9 TOTAL SCORE: 4      Answers for HPI/ROS submitted by the patient on 4/7/2021   LIZZETTE 7 TOTAL SCORE: 9  If you checked off any problems, how difficult have these problems made it for you to do your work, take care of things at home, or get along with other people?: Very difficult  PHQ9 TOTAL SCORE: 7*    *No SI    Answers for HPI/ROS submitted by the patient on 10/13/2020   If you checked off any problems, how difficult have these problems made it for you to do your work, take care of things at home, or get along with other people?: Somewhat difficult  PHQ9 TOTAL SCORE: 8*  LIZZETTE 7 TOTAL SCORE: 6      *No SI     Answers for HPI/ROS submitted by the patient on 12/29/2020   If you checked off any problems, how difficult have these problems made it for you to do your work, take care of things at home, or get along with other people?: Somewhat difficult  PHQ9 TOTAL SCORE: 5*  LIZZETTE 7 TOTAL SCORE: 8    *No SI       Care Plan review completed: yes    Medication Review:  No changes to current psychiatric medication(s)     Current Outpatient Medications   Medication     Acetaminophen (TYLENOL) 325 MG CAPS     ARIPiprazole (ABILIFY) 5 MG tablet     aspirin (SM ASPIRIN ADULT LOW STRENGTH) 81 MG EC tablet     BD VIKTORIA U/F 32G X 4 MM insulin pen needle     ciclopirox (LOPROX) 0.77 % cream     Continuous Blood Gluc  (FREESTYLE CLAUDIA 14 DAY READER) CECILIO     Continuous Blood Gluc Sensor (FREESTYLE CLAUDIA 2 SENSOR) MISC     cyanocobalamin (VITAMIN B-12) 1000 MCG tablet     empagliflozin (JARDIANCE) 10 MG TABS tablet     insulin aspart (NOVOLOG PEN) 100 UNIT/ML pen     insulin degludec (TRESIBA FLEXTOUCH) 100 UNIT/ML pen     lamiVUDine (EPIVIR) 100 MG tablet     lisinopril  (ZESTRIL) 5 MG tablet     methocarbamol (ROBAXIN) 750 MG tablet     metoprolol succinate ER (TOPROL-XL) 25 MG 24 hr tablet     Multiple Vitamin (TAB-A-GLADIS) TABS     mycophenolate (GENERIC EQUIVALENT) 250 MG capsule     order for DME     pantoprazole (PROTONIX) 40 MG EC tablet     polyethylene glycol (MIRALAX) 17 g packet     predniSONE (DELTASONE) 5 MG tablet     rosuvastatin (CRESTOR) 5 MG tablet     senna-docusate (SENOKOT-S/PERICOLACE) 8.6-50 MG tablet     tamsulosin (FLOMAX) 0.4 MG capsule     vitamin D3 (VITAMIN D3) 50 mcg (2000 units) tablet     Current Facility-Administered Medications   Medication     aflibercept (EYLEA) injection prefilled syringe 2 mg     aflibercept (EYLEA) injection prefilled syringe 2 mg         Medication Compliance:  Yes    Changes in Health Issues:   None reported    Chemical Use Review:   Substance Use: Chemical use reviewed, no active concerns identified      Tobacco Use: No current tobacco use.         Assessment: Current Emotional / Mental Status (status of significant symptoms):  Risk status (Self / Other harm or suicidal ideation)  Patient denies a history of suicidal ideation, suicide attempts, self-injurious behavior, homicidal ideation, homicidal behavior and and other safety concerns     Patient denies current fears or concerns for personal safety.  Patient denies current or recent suicidal ideation or behaviors.  Patient denies current or recent homicidal ideation or behaviors.  Patient denies current or recent self injurious behavior or ideation.  Patient denies other safety concerns.     A safety and risk management plan has not been developed at this time, however patient was encouraged to call [place] [phone] should there be a change in any of these risk factors.    [place] Suicide Severity Rating Scale (Short Version)  [place] Suicide Severity Rating (Short Version) 11/10/2019 12/2/2019 12/10/2019 6/11/2020 7/1/2020 7/12/2021 1/10/2022   Over the past 2 weeks  have you felt down, depressed, or hopeless? no yes yes no no no no   Over the past 2 weeks have you had thoughts of killing yourself? no yes no no no no no   Comments - lots of stressors, has thought about not taking meds - - - - -   Have you ever attempted to kill yourself? no no no no no no no   Q1 Wished to be Dead (Past Month) - other (see comments) no - - - -   Comments - why did i do this surgery - - - - -   Q2 Suicidal Thoughts (Past Month) - no no - - - -   Comments - wishes he wouldn't have gone through surgery - - - - -   Q3 Suicidal Thought Method - no no - - - -   Q4 Suicidal Intent without Specific Plan - no no - - - -   Q5 Suicide Intent with Specific Plan - no no - - - -   Q6 Suicide Behavior (Lifetime) - no no - - - -         Appearance:   Appropriate   Eye Contact:   Good   Psychomotor Behavior: Restless   Attitude:   Cooperative  Interested Friendly Pleasant  Orientation:   All  Speech   Rate / Production: Normal/ Responsive   Volume:  Normal   Mood:    Depressed ; improving  Affect:    Appropriate    Thought Content:  Clear   Thought Form:  Goal Directed  Logical   Insight:    Good     Diagnoses:  1. Bipolar 1 disorder, depressed, mild (H)          Collateral Reports Completed:  Not Applicable    Plan: (Homework, other):  Continue with this writer for individual psychotherapy in three weeks. He will continue to work on managing depression and anxiety through achievable behavioral activation ideas presented today. Also encouraged him to start using a journal at night for sleep and managing depressed mood.  No CD issues.     Joseph Murillo, LP  May 17, 2022          ______________________________________________________________________                                            Individual Treatment Plan    Patient's Name: Frandy Workman  YOB: 1965    Date of Creation: 3/1/2022  Date Treatment Plan Last Reviewed/Revised: 3/1/2022  DSM5 Diagnoses: 296.51 Bipolar I Disorder Current or  Most Recent Episode Depressed, Mild or 300.02 (F41.1) Generalized Anxiety Disorder  Psychosocial / Contextual Factors: Post Solid Organ Tx  PROMIS (reviewed every 90 days): 4    Referral / Collaboration:  Referral to another professional/service is not indicated at this time..    Anticipated number of session for this episode of care: 10+  Anticipation frequency of session: Every other week  Anticipated Duration of each session: 38-52 minutes  Treatment plan will be reviewed in 90 days or when goals have been changed.       MeasurableTreatment Goal(s) related to diagnosis / functional impairment(s)  Goal 1: Patient will experience a reduction in depressive symptoms along with a corresponding increase in positive emotion and life satisfaction.      Objective #A (Patient Action)    Patient will Increase interest, engagement, and pleasure in doing things.  Status: Continued - Date(s): March 1, 2022    Intervention(s)  Therapist will help patient identify pleasant and mastery oriented events that elicit positive, relaxed mood.    Objective #B  Patient will Decrease frequency and intensity of feeling down, depressed, hopeless.  Status: Continued - Date(s): March 1, 2022    Intervention(s)  Therapist will introduce patient to cognitive-behavioral and acceptance and commitment therapy topics aimed to help reduce depression and anxiety    Objective #C  Patient will Identify negative self-talk and behaviors: challenge core beliefs, myths, and actions  Improve concentration, focus, and mindfulness in daily activities .  Status: Continued - Date(s): March 1, 2022    Intervention(s)  Therapist will help patient identify and manage negative self-talk and automatic thoughts; introduce patient to cognitive distortions; help patient develop cognitive diffusion techniques      Goal 2: Patient will experience a reduction in anxious symptoms, along with a corresponding increase in relaxed emotional states and life  "satisfaction.      Objective #A (Patient Action)  Patient will use cognitive-behavioral and thought diffusion strategies identified in therapy to challenge anxious thoughts.    Status: Continued - Date(s): March 1, 2022    Intervention(s)  Therapist will utilize CBT and ACT ideas to help patient challenge anxious thoughts and reduce intensity/duration of anxious distress    Objective #B  Patient will use \"worry time\" each day for 15 minutes of scheduled worry and then defer obsessive or anxious thinking until the next structured worry time.    Status: Continued - Date(s): March 1, 2022    Intervention(s)  Therapist will teach patient how to effectively utilize worry time and/or thought logs/journals each day and incorporate more relaxation behaviors into their routine.    Objective #C  Patient will identify the stressors which contribute to feelings of anxiety  Patient will increase engagement in adaptive coping skills and recreational activities, such as exercise and healthy socialization, to manage distress.  Status: Continued - Date(s): March 1, 2022    Intervention(s)  Therapist will help patient identify triggers/situational factors that contribute to anxiety and behavioral skills aimed to manage anxious distress.      Goal 3: Patiient will work toward adaptively managing bipolar-related depression and manic episodes      Objective #A (Patient Action)    Status: Continued - Date(s): March 1, 2022    Patient will identify 2-3 signs or signals of emerging mood instability.    Intervention(s)  Therapist will provide educational materials on bipolar disorder and help identifying triggers for mood instability.    Objective #B  Patient will identify 2-3 strategies for more effectively managing Bipolar Disorder.    Status: Continued - Date(s): March 1, 2022    Intervention(s)  Therapist will teach emotional recognition/identification. Skills aimed to effectively manage bipolar disorder.      Other Possible Therapeutic " Intervention(s):    Psycho-education regarding mental health diagnoses and treatment options    Supportive Therapy    Provide affirmations, reflections, and establish working rapport    Emphasize and reflect on strength of therapeutic relationship    Skills training    Explore skills useful to client in current situation.    Skills include assertiveness, communication, conflict management, problem-solving, relaxation, etc.    Solution-Focused Therapy    Explore patterns in patient's relationships and discuss options for new behaviors.    Explore patterns in patient's actions and choices and discuss options for new behaviors.    Cognitive-behavioral Therapy    Discuss common cognitive distortions, identify them in patient's life.    Explore ways to challenge, replace, and act against these cognitions.    Acceptance and Commitment Therapy    Explore and identify important values in patient's life.    Discuss ways to commit to behavioral activation around these values.    Psychodynamic psychotherapy    Discuss patient's emotional dynamics and issues and how they impact behaviors.    Explore patient's history of relationships and how they impact present behaviors.    Explore how to work with and make changes in these schemas and patterns.    Narrative Therapy    Explore the patient's story of his/her life from his/her perspective.    Explore alternate ways of understanding their experience, identifying exceptions, developing new themes.    Interpersonal Psychotherapy    Explore patterns in relationships that are effective or ineffective at helping patient reach their goals, find satisfying experience.    Discuss new patterns or behaviors to engage in for improved social functioning.    Behavioral Activation    Discuss steps patient can take to become more involved in meaningful activity.    Identify barriers to these activities and explore possible solutions.    Mindfulness-Based Strategies    Discuss skills based on  development and application of mindfulness.    Skills drawn from compassion-focused therapy, dialectical behavior therapy, mindfulness-based stress reduction, mindfulness-based cognitive therapy, etc.      Patient has reviewed and agreed to the above plan.      Joseph Murillo Psy.D, RED   Behavioral Health Clinician   [facility]     March 1, 2022   Answers for HPI/ROS submitted by the patient on 5/17/2022  If you checked off any problems, how difficult have these problems made it for you to do your work, take care of things at home, or get along with other people?: Extremely difficult  PHQ9 TOTAL SCORE: 5

## 2022-05-18 ENCOUNTER — LAB (OUTPATIENT)
Dept: LAB | Facility: CLINIC | Age: 57
End: 2022-05-18
Payer: MEDICARE

## 2022-05-18 ENCOUNTER — TELEPHONE (OUTPATIENT)
Dept: PHARMACY | Facility: CLINIC | Age: 57
End: 2022-05-18

## 2022-05-18 ENCOUNTER — ALLIED HEALTH/NURSE VISIT (OUTPATIENT)
Dept: TRANSPLANT | Facility: CLINIC | Age: 57
End: 2022-05-18
Attending: INTERNAL MEDICINE
Payer: MEDICARE

## 2022-05-18 DIAGNOSIS — D63.1 ANEMIA DUE TO STAGE 4 CHRONIC KIDNEY DISEASE (H): Primary | ICD-10-CM

## 2022-05-18 DIAGNOSIS — N18.32 STAGE 3B CHRONIC KIDNEY DISEASE (H): ICD-10-CM

## 2022-05-18 DIAGNOSIS — D63.1 ANEMIA OF CHRONIC RENAL FAILURE, STAGE 3B (H): ICD-10-CM

## 2022-05-18 DIAGNOSIS — N18.32 ANEMIA OF CHRONIC RENAL FAILURE, STAGE 3B (H): ICD-10-CM

## 2022-05-18 DIAGNOSIS — N18.4 ANEMIA DUE TO STAGE 4 CHRONIC KIDNEY DISEASE (H): Primary | ICD-10-CM

## 2022-05-18 LAB
HCT VFR BLD AUTO: 35.2 % (ref 40–53)
HGB BLD-MCNC: 11 G/DL (ref 13.3–17.7)

## 2022-05-18 PROCEDURE — 85014 HEMATOCRIT: CPT | Performed by: PATHOLOGY

## 2022-05-18 PROCEDURE — 36415 COLL VENOUS BLD VENIPUNCTURE: CPT | Performed by: PATHOLOGY

## 2022-05-18 PROCEDURE — 85018 HEMOGLOBIN: CPT | Performed by: PATHOLOGY

## 2022-05-18 ASSESSMENT — PATIENT HEALTH QUESTIONNAIRE - PHQ9: SUM OF ALL RESPONSES TO PHQ QUESTIONS 1-9: 5

## 2022-05-18 NOTE — NURSING NOTE
Chief Complaint   Patient presents with     Allied Health Visit     Arenesp     No injection given. HGB is at 11 toady. Read admit therapy plan for details.    Oriana Lerner, CMA

## 2022-05-18 NOTE — TELEPHONE ENCOUNTER
Anemia Management Note  SUBJECTIVE/OBJECTIVE:  Referred by Dr. Eloy Rao on 2021  Primary Diagnosis: Anemia in Chronic Kidney Disease (N18.3, D63.1)   3b  Secondary Diagnosis:  Chronic Kidney Disease, Stage 3 (N18.3)  3b  Liver Tx: 2019  Hgb goal range:  9-10  Epo/Darbo: Aranesp 100mcg every 7 days for Hgb <10.  In Clinic.  *22 ok to schedule every 2 weeks for now.   *22: dose changed to weekly per Dr. Rao  *dose increased to 100mcg starting 22  *dose increased to 60mcg starting with  21 dose  Iron regimen:  NA.  Iron levels stable  Labs : 2022  Recent IVELISSE use, transfusion, IV iron: Aranesp   RX/TX plans :  6/10/2022     No history of stroke, MI and blood clots.  History of hepatocellular carcinoma - s/p TACE 2019 and liver transplant 2019.     Contact:            Ok to leave message regarding scheduling, medical, and billing per consent to communicate dated 10/2/19                              OK to speak with Davi Saunders (friend/POA) regarding scheduling, medical, and billing per consent to communicate dated 10/2/19    Anemia Latest Ref Rng & Units 3/23/2022 3/30/2022 2022 2022 2022 2022 2022   IVELISSE Dose - - - - - - - -   Hemoglobin 13.3 - 17.7 g/dL 10.1(L) 11.0(L) 11.5(L) 11.7(L) 11.3(L) 10.8(L) 11.0(L)   TSAT 15 - 46 % - - - - 42 - -   Ferritin 26 - 388 ng/mL - - - - 508(H) - -   PRBCs - - - - - - - -     BP Readings from Last 3 Encounters:   22 99/64   22 99/58   03/15/22 100/62     Wt Readings from Last 2 Encounters:   22 168 lb 12.8 oz (76.6 kg)   03/15/22 159 lb 4.8 oz (72.3 kg)           ASSESSMENT:  Hgb:Above goal - recommend hold dose  TSat: at goal >30% Ferritin: At goal (>100ng/mL)    PLAN:  Hold Aranesp and RTC for hgb then aranesp if needed in 2 week(s)    Orders needed to be renewed (for next follow-up date) in EPIC: None    Iron labs due:  Aug 2022    Plan discussed with:  No call, chart review        NEXT FOLLOW-UP DATE:  6/1/22   bryan Barber, Need to update orders.  Maybe change appts to Monthly?    Jie Blum RN   [facility]  [address] [address]   [email or web address]   Office : [phone]  Fax: [phone]

## 2022-05-20 DIAGNOSIS — E11.3293 TYPE 2 DIABETES MELLITUS WITH MILD NONPROLIFERATIVE RETINOPATHY OF BOTH EYES WITHOUT MACULAR EDEMA, UNSPECIFIED WHETHER LONG TERM INSULIN USE (H): ICD-10-CM

## 2022-06-01 ENCOUNTER — TELEPHONE (OUTPATIENT)
Dept: PHARMACY | Facility: CLINIC | Age: 57
End: 2022-06-01

## 2022-06-01 ENCOUNTER — ALLIED HEALTH/NURSE VISIT (OUTPATIENT)
Dept: TRANSPLANT | Facility: CLINIC | Age: 57
End: 2022-06-01
Payer: MEDICARE

## 2022-06-01 ENCOUNTER — LAB (OUTPATIENT)
Dept: LAB | Facility: CLINIC | Age: 57
End: 2022-06-01
Payer: MEDICARE

## 2022-06-01 DIAGNOSIS — D63.1 ANEMIA DUE TO STAGE 4 CHRONIC KIDNEY DISEASE (H): Primary | ICD-10-CM

## 2022-06-01 DIAGNOSIS — N18.4 ANEMIA DUE TO STAGE 4 CHRONIC KIDNEY DISEASE (H): Primary | ICD-10-CM

## 2022-06-01 DIAGNOSIS — N18.32 ANEMIA OF CHRONIC RENAL FAILURE, STAGE 3B (H): ICD-10-CM

## 2022-06-01 DIAGNOSIS — N18.32 STAGE 3B CHRONIC KIDNEY DISEASE (H): ICD-10-CM

## 2022-06-01 DIAGNOSIS — D63.1 ANEMIA OF CHRONIC RENAL FAILURE, STAGE 3B (H): ICD-10-CM

## 2022-06-01 LAB
HCT VFR BLD AUTO: 37.5 % (ref 40–53)
HGB BLD-MCNC: 11.9 G/DL (ref 13.3–17.7)

## 2022-06-01 PROCEDURE — 85018 HEMOGLOBIN: CPT | Performed by: PATHOLOGY

## 2022-06-01 PROCEDURE — 85014 HEMATOCRIT: CPT | Performed by: PATHOLOGY

## 2022-06-01 PROCEDURE — 36415 COLL VENOUS BLD VENIPUNCTURE: CPT | Performed by: PATHOLOGY

## 2022-06-01 NOTE — PROGRESS NOTES
Hbg is 11.7 no Aranesp needed. Patient will come back in 2 weeks for lab and nurse visit.    Nereida Steiner CMA

## 2022-06-01 NOTE — TELEPHONE ENCOUNTER
Anemia Management Note  SUBJECTIVE/OBJECTIVE:  Referred by Dr. Eloy Rao on 2021  Primary Diagnosis: Anemia in Chronic Kidney Disease (N18.3, D63.1)   3b  Secondary Diagnosis:  Chronic Kidney Disease, Stage 3 (N18.3)  3b  Liver Tx: 2019  Hgb goal range:  9-10  Epo/Darbo: Aranesp 100mcg every 7 days for Hgb <10.  In Clinic.  *22 ok to schedule every month for now.   *22 ok to schedule every 2 weeks for now.   *22: dose changed to weekly per Dr. Rao  *dose increased to 100mcg starting 22  *dose increased to 60mcg starting with  21 dose  Iron regimen:  NA.  Iron levels stable  Labs : 2022  Recent IVELISSE use, transfusion, IV iron: Aranesp 2020  RX/TX plans :  6/10/2022     No history of stroke, MI and blood clots.  History of hepatocellular carcinoma - s/p TACE 2019 and liver transplant 2019.     Contact:            Ok to leave message regarding scheduling, medical, and billing per consent to communicate dated 10/2/19                              OK to speak with Davi Nicky (friend/POA) regarding scheduling, medical, and billing per consent to communicate dated 10/2/19    Anemia Latest Ref Rng & Units 3/30/2022 2022 2022 2022 2022 2022 2022   IVELISSE Dose - - - - - - - -   Hemoglobin 13.3 - 17.7 g/dL 11.0(L) 11.5(L) 11.7(L) 11.3(L) 10.8(L) 11.0(L) 11.9(L)   TSAT 15 - 46 % - - - 42 - - -   Ferritin 26 - 388 ng/mL - - - 508(H) - - -   PRBCs - - - - - - - -     BP Readings from Last 3 Encounters:   22 99/64   22 99/58   03/15/22 100/62     Wt Readings from Last 2 Encounters:   22 168 lb 12.8 oz (76.6 kg)   03/15/22 159 lb 4.8 oz (72.3 kg)           ASSESSMENT:  Hgb:Above goal - recommend hold dose  TSat: at goal >30% Ferritin: At goal (>100ng/mL)    PLAN:  Hold Aranesp and RTC for hgb then aranesp if needed in 2-4 week(s). Hgb has been >10.0 since 3/23/22 . OK to have Hgb drawn monthly.     Orders needed to be renewed  (for next follow-up date) in EPIC: None    Iron labs due:  July 2022    Plan discussed with:  Miller      NEXT FOLLOW-UP DATE:  7/7/22    Jie Blum RN   Anemia Services  [address] [address]   [email or web address]   Office : [phone]  Fax: [phone]

## 2022-06-08 ENCOUNTER — LAB (OUTPATIENT)
Dept: LAB | Facility: CLINIC | Age: 57
End: 2022-06-08

## 2022-06-08 ENCOUNTER — ANCILLARY PROCEDURE (OUTPATIENT)
Dept: CT IMAGING | Facility: CLINIC | Age: 57
End: 2022-06-08
Attending: NURSE PRACTITIONER
Payer: MEDICARE

## 2022-06-08 DIAGNOSIS — Z13.220 LIPID SCREENING: ICD-10-CM

## 2022-06-08 DIAGNOSIS — Z94.4 LIVER REPLACED BY TRANSPLANT (H): ICD-10-CM

## 2022-06-08 DIAGNOSIS — Z94.4 LIVER TRANSPLANT RECIPIENT (H): ICD-10-CM

## 2022-06-08 DIAGNOSIS — N18.32 STAGE 3B CHRONIC KIDNEY DISEASE (H): ICD-10-CM

## 2022-06-08 DIAGNOSIS — C22.0 HCC (HEPATOCELLULAR CARCINOMA) (H): ICD-10-CM

## 2022-06-08 DIAGNOSIS — D63.1 ANEMIA OF CHRONIC RENAL FAILURE, STAGE 3B (H): ICD-10-CM

## 2022-06-08 DIAGNOSIS — N18.32 ANEMIA OF CHRONIC RENAL FAILURE, STAGE 3B (H): ICD-10-CM

## 2022-06-08 LAB
AFP SERPL-MCNC: 2.3 UG/L (ref 0–8)
ALBUMIN SERPL-MCNC: 4.1 G/DL (ref 3.4–5)
ALP SERPL-CCNC: 85 U/L (ref 40–150)
ALT SERPL W P-5'-P-CCNC: 26 U/L (ref 0–70)
ANION GAP SERPL CALCULATED.3IONS-SCNC: 7 MMOL/L (ref 3–14)
AST SERPL W P-5'-P-CCNC: 12 U/L (ref 0–45)
BASOPHILS # BLD AUTO: 0 10E3/UL (ref 0–0.2)
BASOPHILS NFR BLD AUTO: 1 %
BILIRUB DIRECT SERPL-MCNC: <0.1 MG/DL (ref 0–0.2)
BILIRUB SERPL-MCNC: 0.4 MG/DL (ref 0.2–1.3)
BUN SERPL-MCNC: 35 MG/DL (ref 7–30)
CALCIUM SERPL-MCNC: 9 MG/DL (ref 8.5–10.1)
CHLORIDE BLD-SCNC: 106 MMOL/L (ref 94–109)
CO2 SERPL-SCNC: 27 MMOL/L (ref 20–32)
CREAT BLD-MCNC: 1.7 MG/DL (ref 0.7–1.3)
CREAT SERPL-MCNC: 1.77 MG/DL (ref 0.66–1.25)
EOSINOPHIL # BLD AUTO: 0.1 10E3/UL (ref 0–0.7)
EOSINOPHIL NFR BLD AUTO: 2 %
ERYTHROCYTE [DISTWIDTH] IN BLOOD BY AUTOMATED COUNT: 13.4 % (ref 10–15)
GFR SERPL CREATININE-BSD FRML MDRD: 44 ML/MIN/1.73M2
GFR SERPL CREATININE-BSD FRML MDRD: 46 ML/MIN/1.73M2
GLUCOSE BLD-MCNC: 105 MG/DL (ref 70–99)
HCT VFR BLD AUTO: 37.7 % (ref 40–53)
HGB BLD-MCNC: 11.8 G/DL (ref 13.3–17.7)
IMM GRANULOCYTES # BLD: 0.1 10E3/UL
IMM GRANULOCYTES NFR BLD: 1 %
LYMPHOCYTES # BLD AUTO: 1.2 10E3/UL (ref 0.8–5.3)
LYMPHOCYTES NFR BLD AUTO: 23 %
MCH RBC QN AUTO: 31.5 PG (ref 26.5–33)
MCHC RBC AUTO-ENTMCNC: 31.3 G/DL (ref 31.5–36.5)
MCV RBC AUTO: 101 FL (ref 78–100)
MONOCYTES # BLD AUTO: 0.4 10E3/UL (ref 0–1.3)
MONOCYTES NFR BLD AUTO: 7 %
NEUTROPHILS # BLD AUTO: 3.5 10E3/UL (ref 1.6–8.3)
NEUTROPHILS NFR BLD AUTO: 66 %
NRBC # BLD AUTO: 0 10E3/UL
NRBC BLD AUTO-RTO: 0 /100
PLATELET # BLD AUTO: 139 10E3/UL (ref 150–450)
POTASSIUM BLD-SCNC: 4.6 MMOL/L (ref 3.4–5.3)
PROT SERPL-MCNC: 7.2 G/DL (ref 6.8–8.8)
RBC # BLD AUTO: 3.75 10E6/UL (ref 4.4–5.9)
SODIUM SERPL-SCNC: 140 MMOL/L (ref 133–144)
WBC # BLD AUTO: 5.2 10E3/UL (ref 4–11)

## 2022-06-08 PROCEDURE — 82565 ASSAY OF CREATININE: CPT | Mod: 91 | Performed by: PATHOLOGY

## 2022-06-08 PROCEDURE — 82105 ALPHA-FETOPROTEIN SERUM: CPT | Performed by: NURSE PRACTITIONER

## 2022-06-08 PROCEDURE — 80053 COMPREHEN METABOLIC PANEL: CPT | Performed by: PATHOLOGY

## 2022-06-08 PROCEDURE — 74178 CT ABD&PLV WO CNTR FLWD CNTR: CPT | Mod: MG | Performed by: RADIOLOGY

## 2022-06-08 PROCEDURE — G1004 CDSM NDSC: HCPCS | Performed by: RADIOLOGY

## 2022-06-08 PROCEDURE — 71260 CT THORAX DX C+: CPT | Mod: MG | Performed by: RADIOLOGY

## 2022-06-08 PROCEDURE — 85025 COMPLETE CBC W/AUTO DIFF WBC: CPT | Performed by: PATHOLOGY

## 2022-06-08 PROCEDURE — 36415 COLL VENOUS BLD VENIPUNCTURE: CPT | Performed by: PATHOLOGY

## 2022-06-08 PROCEDURE — 82248 BILIRUBIN DIRECT: CPT | Performed by: PATHOLOGY

## 2022-06-08 RX ORDER — IOPAMIDOL 755 MG/ML
100 INJECTION, SOLUTION INTRAVASCULAR ONCE
Status: COMPLETED | OUTPATIENT
Start: 2022-06-08 | End: 2022-06-08

## 2022-06-08 RX ADMIN — IOPAMIDOL 100 ML: 755 INJECTION, SOLUTION INTRAVASCULAR at 08:36

## 2022-06-14 ENCOUNTER — VIRTUAL VISIT (OUTPATIENT)
Dept: BEHAVIORAL HEALTH | Facility: CLINIC | Age: 57
End: 2022-06-14
Payer: MEDICARE

## 2022-06-14 DIAGNOSIS — F31.31 BIPOLAR 1 DISORDER, DEPRESSED, MILD (H): Primary | ICD-10-CM

## 2022-06-14 DIAGNOSIS — F41.1 GENERALIZED ANXIETY DISORDER: ICD-10-CM

## 2022-06-14 PROCEDURE — 90832 PSYTX W PT 30 MINUTES: CPT | Mod: 95 | Performed by: PSYCHOLOGIST

## 2022-06-16 ENCOUNTER — VIRTUAL VISIT (OUTPATIENT)
Dept: ONCOLOGY | Facility: CLINIC | Age: 57
End: 2022-06-16
Attending: NURSE PRACTITIONER
Payer: MEDICARE

## 2022-06-16 DIAGNOSIS — C22.0 HCC (HEPATOCELLULAR CARCINOMA) (H): Primary | ICD-10-CM

## 2022-06-16 DIAGNOSIS — Z94.4 LIVER TRANSPLANT RECIPIENT (H): ICD-10-CM

## 2022-06-16 DIAGNOSIS — N18.31 STAGE 3A CHRONIC KIDNEY DISEASE (H): ICD-10-CM

## 2022-06-16 PROCEDURE — G0463 HOSPITAL OUTPT CLINIC VISIT: HCPCS | Mod: PN,RTG | Performed by: NURSE PRACTITIONER

## 2022-06-16 PROCEDURE — 99213 OFFICE O/P EST LOW 20 MIN: CPT | Mod: 95 | Performed by: NURSE PRACTITIONER

## 2022-06-16 NOTE — LETTER
6/16/2022         RE: Frandy Workman  [address] [facility] [address]        Dear Colleague,    Thank you for referring your patient, Frandy Workman, to the [facility]. Please see a copy of my visit note below.    Reason for Visit: seen in f/u of HCC    Oncology HPI:   Miller Workman is a 57 year old man with a PMH of DMII, bipolar disorder, cirrhosis s/t ESTRADA with subsequent development of HCC, s/p liver transplant in Jan 2021, HLD, HTN, GERD, BPH and CAD with hx of 2 vessel CABG in July 2021, CKD with anemia of chronic disease, on erythropoetin.      He as diagnosed with hepatocellular carcinoma in December 2018 when he was found to have 2 LIRADS 5 lesions on surveillance imaging. He underwent TACE on 1/22/19.  He is s/p  liver transplant on 11/11/2019. The explanted liver had 2 lesions that were mostly necrotic and less than 3 cm with no clearly identifiable vascular invasion.  He is here for routine surveillance today.       Interval history:   Miller is feeling well. Has some chronic fatigue which has been stable. Is anemic and on erythropoetin. Appetite is good.  Mood has been stable. Denies new concerns today.     Current Outpatient Medications   Medication Sig Dispense Refill     Acetaminophen (TYLENOL) 325 MG CAPS Take 325-650 mg by mouth every 4 hours as needed (pain, fever) 100 capsule 1     ARIPiprazole (ABILIFY) 5 MG tablet daily       aspirin (SM ASPIRIN ADULT LOW STRENGTH) 81 MG EC tablet Take 2 tablets (162 mg) by mouth daily 180 tablet 1     BD VIKTORIA U/F 32G X 4 MM insulin pen needle Use 5 per day 300 each 3     ciclopirox (LOPROX) 0.77 % cream Apply topically 2 times daily To feet and toenails. 90 g 5     Continuous Blood Gluc  (FREESTYLE CLAUDIA 14 DAY READER) CECILIO Use to read blood sugars as per 's instructions. 1 each 0     Continuous Blood Gluc Sensor (FREESTYLE CLAUDIA 2 SENSOR) MISC 1 each See Admin Instructions Change every 14 days. 2  each 5     cyanocobalamin (VITAMIN B-12) 1000 MCG tablet TAKE 1 TABLET (1,000 MCG) BY MOUTH DAILY 30 tablet 11     empagliflozin (JARDIANCE) 10 MG TABS tablet Take 1 tablet (10 mg) by mouth daily 30 tablet 11     insulin aspart (NOVOLOG PEN) 100 UNIT/ML pen Inject 2-7 Units Subcutaneous 4 times daily (with meals and nightly) 1unit : 20 g carb before meals.  Also add 1 unit : 50 mg/dl >180 before meals and at bedtime. 15 mL 3     insulin degludec (TRESIBA FLEXTOUCH) 100 UNIT/ML pen Inject 26 units subcutaneous once daily 25 mL 3     lamiVUDine (EPIVIR) 100 MG tablet Take 1 tablet (100 mg) by mouth daily 90 tablet 3     lisinopril (ZESTRIL) 5 MG tablet Take 1 tablet (5 mg) by mouth daily 30 tablet 11     methocarbamol (ROBAXIN) 750 MG tablet Take 1 tablet (750 mg) by mouth 3 times daily as needed for muscle spasms (sternal pain) 60 tablet 0     metoprolol succinate ER (TOPROL-XL) 25 MG 24 hr tablet Take 0.5 tablets (12.5 mg) by mouth daily 60 tablet 3     Multiple Vitamin (TAB-A-GLADIS) TABS Take 1 tablet by mouth daily 90 tablet 3     mycophenolate (GENERIC EQUIVALENT) 250 MG capsule Take 2 capsules (500 mg) by mouth every 12 hours 120 capsule 11     order for DME 1 Device by Device route daily Knee high compression socks 15-20 mmhg. 1 Device 0     pantoprazole (PROTONIX) 40 MG EC tablet Take 1 tablet (40 mg) by mouth daily before breakfast 90 tablet 3     polyethylene glycol (MIRALAX) 17 g packet Take 1 packet by mouth daily       predniSONE (DELTASONE) 5 MG tablet TAKE ONE TABLET BY MOUTH ONCE DAILY 90 tablet 3     rosuvastatin (CRESTOR) 5 MG tablet Take 1 tablet (5 mg) by mouth daily 90 tablet 3     senna-docusate (SENOKOT-S/PERICOLACE) 8.6-50 MG tablet Take 1 tablet by mouth 2 times daily as needed for constipation 50 tablet 0     tamsulosin (FLOMAX) 0.4 MG capsule Take 1 capsule (0.4 mg) by mouth daily 90 capsule 3     vitamin D3 (VITAMIN D3) 50 mcg (2000 units) tablet Take 1 tablet (50 mcg) by mouth daily 90  tablet 2          Allergies   Allergen Reactions     Codeine Other (See Comments)     Cannot take due to liver  Cannot tolerate oral narcotics     Seasonal Allergies      Sneezing, coughing, runny and itchy eyes       Video physical exam  General: Patient appears well in no acute distress.   Skin: No visualized rash or lesions on visualized skin  Eyes: EOMI, no erythema, sclera icterus or discharge noted  Resp: Appears to be breathing comfortably without accessory muscle usage, speaking in full sentences, no cough  MSK: Appears to have normal range of motion based on visualized movements  Neurologic: No apparent tremors, facial movements symmetric  Psych: affect pleasant, interactive, alert and oriented    Labs:    Latest Reference Range & Units 06/08/22 07:55   Sodium 133 - 144 mmol/L 140   Potassium 3.4 - 5.3 mmol/L 4.6   Chloride 94 - 109 mmol/L 106   Carbon Dioxide 20 - 32 mmol/L 27   Urea Nitrogen 7 - 30 mg/dL 35 (H)   Creatinine 0.66 - 1.25 mg/dL 1.77 (H)   GFR Estimate >60 mL/min/1.73m2 44 (L) [1]   Calcium 8.5 - 10.1 mg/dL 9.0   Anion Gap 3 - 14 mmol/L 7   Albumin 3.4 - 5.0 g/dL 4.1   Protein Total 6.8 - 8.8 g/dL 7.2   Bilirubin Total 0.2 - 1.3 mg/dL 0.4   Alkaline Phosphatase 40 - 150 U/L 85   ALT 0 - 70 U/L 26   AST 0 - 45 U/L 12   Alpha Fetoprotein 0.0 - 8.0 ug/L 2.3   Bilirubin Direct 0.0 - 0.2 mg/dL <0.1   Glucose 70 - 99 mg/dL 105 (H)   WBC 4.0 - 11.0 10e3/uL 5.2   Hemoglobin 13.3 - 17.7 g/dL 11.8 (L)   Hematocrit 40.0 - 53.0 % 37.7 (L)   Platelet Count 150 - 450 10e3/uL 139 (L)   RBC Count 4.40 - 5.90 10e6/uL 3.75 (L)   MCV 78 - 100 fL 101 (H)   MCH 26.5 - 33.0 pg 31.5   MCHC 31.5 - 36.5 g/dL 31.3 (L)   RDW 10.0 - 15.0 % 13.4   % Neutrophils % 66   % Lymphocytes % 23   % Monocytes % 7   % Eosinophils % 2   % Basophils % 1   Absolute Basophils 0.0 - 0.2 10e3/uL 0.0   Absolute Eosinophils 0.0 - 0.7 10e3/uL 0.1   Absolute Immature Granulocytes <=0.4 10e3/uL 0.1   Absolute Lymphocytes 0.8 - 5.3 10e3/uL  1.2   Absolute Monocytes 0.0 - 1.3 10e3/uL 0.4   % Immature Granulocytes % 1   Absolute Neutrophils 1.6 - 8.3 10e3/uL 3.5   Absolute NRBCs 10e3/uL 0.0   NRBCs per 100 WBC <1 /100 0   Imaging:   EXAMINATION: CT ABDOMEN WO & W CHEST PELVIS W CONTRAST, 6/8/2022  9:26 AM     TECHNIQUE: Helical CT images from the thoracic inlet through the  symphysis pubis were obtained with intravenous contrast. Contrast  dose: Isovue 370  100 mls     COMPARISON: CT 11/26/2021     HISTORY: hepatocellular carcinoma, s/p liver transplantation. Evaluate  for recurrence; HCC (hepatocellular carcinoma) (H); Liver transplant  recipient (H)     FINDINGS:     Lower neck: Visualized portions of the lower neck and thyroid gland  are unremarkable.     Chest:   Heart/ Mediastinum: Heart is within normal limits. No evidence of  central pulmonary embolism. No bulky lymphadenopathy.  Esophagus  appears normal.  Lungs/pleura: The central tracheobronchial tree is patent.  No focal  mass or consolidation. Atelectasis in the right lung base. No  suspicious nodules. No pneumothorax or pleural effusion.   Chest wall/axilla: No bulky lymphadenopathy..     Abdomen and pelvis:  Hepatobiliary: Post changes of liver transplantation. No focal hepatic  mass. Portal veins, hepatic veins and hepatic arteries are patent.  No  intra or extrahepatic biliary dilatation.. The gall bladder is  surgically absent.     Pancreas: No evidence of pancreatic mass or peripancreatic fluid.     Spleen: Measures 13.2 cm. No focal lesions.     Adrenals: Normal.     Kidneys: No hydronephrosis, calculi or mass.  Urinary bladder: Unremarkable.  Reproductive organs: Prostate and seminal vesicles are unremarkable.     Gastrointestinal: The stomach and duodenum are unremarkable. Small and  large bowel are normal in caliber and without abnormal wall  thickening. The appendix is normal.  Mesentery/Peritoneum: No ascites or pneumoperitoneum.     Lymph nodes: No lymphadenopathy.  Vasculature:  No aortic aneurysm.      Bones and soft tissues: No aggressive lytic or sclerotic lesions.                                                                   IMPRESSION:  1.  Unremarkable transplant liver. No focal hepatic mass.  2.  Sequelae of portal hypertension including splenomegaly and  portosystemic collaterals.   3.  No metastases in the chest.     JESSICA YI MD    Impression/plan:   HCC s/p liver transplantation on 1/11/2019.   -I reviewed CT imaging and labs. There is no evidence of recurrence or new malignancy.  -he is recommended to continue surveillance with CT imaging and labs at 6 month intervals. He will f/u with Dr. Villarreal at that time.    He continues to follow with hepatology, cardiology, nephrology and psychiatry.    Sincerely,    SHANIQUE Liz CNP

## 2022-06-16 NOTE — PROGRESS NOTES
Miller is a 57 year old who is being evaluated via a billable video visit.      If the video visit is dropped, the invitation should be resent by: Send to e-mail at: [email or web address]  Will anyone else be joining your video visit? No        Video-Visit Details    Video Start Time: 12:32 PM    Type of service:  Video Visit    Video End Time:12:39 PM    Originating Location (pt. Location): Home    Distant Location (provider location):  [facility]     Platform used for Video Visit: [employer]    Reason for Visit: seen in f/u of HCC    Oncology HPI:   Miller Workman is a 57 year old man with a PMH of DMII, bipolar disorder, cirrhosis s/t ESTRADA with subsequent development of HCC, s/p liver transplant in Jan 2021, HLD, HTN, GERD, BPH and CAD with hx of 2 vessel CABG in July 2021, CKD with anemia of chronic disease, on erythropoetin.      He as diagnosed with hepatocellular carcinoma in December 2018 when he was found to have 2 LIRADS 5 lesions on surveillance imaging. He underwent TACE on 1/22/19.  He is s/p  liver transplant on 11/11/2019. The explanted liver had 2 lesions that were mostly necrotic and less than 3 cm with no clearly identifiable vascular invasion.  He is here for routine surveillance today.       Interval history:   Miller is feeling well. Has some chronic fatigue which has been stable. Is anemic and on erythropoetin. Appetite is good.  Mood has been stable. Denies new concerns today.     Current Outpatient Medications   Medication Sig Dispense Refill     Acetaminophen (TYLENOL) 325 MG CAPS Take 325-650 mg by mouth every 4 hours as needed (pain, fever) 100 capsule 1     ARIPiprazole (ABILIFY) 5 MG tablet daily       aspirin (SM ASPIRIN ADULT LOW STRENGTH) 81 MG EC tablet Take 2 tablets (162 mg) by mouth daily 180 tablet 1     BD VIKTORIA U/F 32G X 4 MM insulin pen needle Use 5 per day 300 each 3     ciclopirox (LOPROX) 0.77 % cream Apply topically 2 times daily To feet and toenails. 90 g 5      Continuous Blood Gluc  (FREESTYLE CLAUDIA 14 DAY READER) CECILIO Use to read blood sugars as per 's instructions. 1 each 0     Continuous Blood Gluc Sensor (FREESTYLE CLAUDIA 2 SENSOR) MISC 1 each See Admin Instructions Change every 14 days. 2 each 5     cyanocobalamin (VITAMIN B-12) 1000 MCG tablet TAKE 1 TABLET (1,000 MCG) BY MOUTH DAILY 30 tablet 11     empagliflozin (JARDIANCE) 10 MG TABS tablet Take 1 tablet (10 mg) by mouth daily 30 tablet 11     insulin aspart (NOVOLOG PEN) 100 UNIT/ML pen Inject 2-7 Units Subcutaneous 4 times daily (with meals and nightly) 1unit : 20 g carb before meals.  Also add 1 unit : 50 mg/dl >180 before meals and at bedtime. 15 mL 3     insulin degludec (TRESIBA FLEXTOUCH) 100 UNIT/ML pen Inject 26 units subcutaneous once daily 25 mL 3     lamiVUDine (EPIVIR) 100 MG tablet Take 1 tablet (100 mg) by mouth daily 90 tablet 3     lisinopril (ZESTRIL) 5 MG tablet Take 1 tablet (5 mg) by mouth daily 30 tablet 11     methocarbamol (ROBAXIN) 750 MG tablet Take 1 tablet (750 mg) by mouth 3 times daily as needed for muscle spasms (sternal pain) 60 tablet 0     metoprolol succinate ER (TOPROL-XL) 25 MG 24 hr tablet Take 0.5 tablets (12.5 mg) by mouth daily 60 tablet 3     Multiple Vitamin (TAB-A-GLADIS) TABS Take 1 tablet by mouth daily 90 tablet 3     mycophenolate (GENERIC EQUIVALENT) 250 MG capsule Take 2 capsules (500 mg) by mouth every 12 hours 120 capsule 11     order for DME 1 Device by Device route daily Knee high compression socks 15-20 mmhg. 1 Device 0     pantoprazole (PROTONIX) 40 MG EC tablet Take 1 tablet (40 mg) by mouth daily before breakfast 90 tablet 3     polyethylene glycol (MIRALAX) 17 g packet Take 1 packet by mouth daily       predniSONE (DELTASONE) 5 MG tablet TAKE ONE TABLET BY MOUTH ONCE DAILY 90 tablet 3     rosuvastatin (CRESTOR) 5 MG tablet Take 1 tablet (5 mg) by mouth daily 90 tablet 3     senna-docusate (SENOKOT-S/PERICOLACE) 8.6-50 MG tablet Take 1  tablet by mouth 2 times daily as needed for constipation 50 tablet 0     tamsulosin (FLOMAX) 0.4 MG capsule Take 1 capsule (0.4 mg) by mouth daily 90 capsule 3     vitamin D3 (VITAMIN D3) 50 mcg (2000 units) tablet Take 1 tablet (50 mcg) by mouth daily 90 tablet 2          Allergies   Allergen Reactions     Codeine Other (See Comments)     Cannot take due to liver  Cannot tolerate oral narcotics     Seasonal Allergies      Sneezing, coughing, runny and itchy eyes       Video physical exam  General: Patient appears well in no acute distress.   Skin: No visualized rash or lesions on visualized skin  Eyes: EOMI, no erythema, sclera icterus or discharge noted  Resp: Appears to be breathing comfortably without accessory muscle usage, speaking in full sentences, no cough  MSK: Appears to have normal range of motion based on visualized movements  Neurologic: No apparent tremors, facial movements symmetric  Psych: affect pleasant, interactive, alert and oriented    Labs:    Latest Reference Range & Units 06/08/22 07:55   Sodium 133 - 144 mmol/L 140   Potassium 3.4 - 5.3 mmol/L 4.6   Chloride 94 - 109 mmol/L 106   Carbon Dioxide 20 - 32 mmol/L 27   Urea Nitrogen 7 - 30 mg/dL 35 (H)   Creatinine 0.66 - 1.25 mg/dL 1.77 (H)   GFR Estimate >60 mL/min/1.73m2 44 (L) [1]   Calcium 8.5 - 10.1 mg/dL 9.0   Anion Gap 3 - 14 mmol/L 7   Albumin 3.4 - 5.0 g/dL 4.1   Protein Total 6.8 - 8.8 g/dL 7.2   Bilirubin Total 0.2 - 1.3 mg/dL 0.4   Alkaline Phosphatase 40 - 150 U/L 85   ALT 0 - 70 U/L 26   AST 0 - 45 U/L 12   Alpha Fetoprotein 0.0 - 8.0 ug/L 2.3   Bilirubin Direct 0.0 - 0.2 mg/dL <0.1   Glucose 70 - 99 mg/dL 105 (H)   WBC 4.0 - 11.0 10e3/uL 5.2   Hemoglobin 13.3 - 17.7 g/dL 11.8 (L)   Hematocrit 40.0 - 53.0 % 37.7 (L)   Platelet Count 150 - 450 10e3/uL 139 (L)   RBC Count 4.40 - 5.90 10e6/uL 3.75 (L)   MCV 78 - 100 fL 101 (H)   MCH 26.5 - 33.0 pg 31.5   MCHC 31.5 - 36.5 g/dL 31.3 (L)   RDW 10.0 - 15.0 % 13.4   % Neutrophils % 66   %  Lymphocytes % 23   % Monocytes % 7   % Eosinophils % 2   % Basophils % 1   Absolute Basophils 0.0 - 0.2 10e3/uL 0.0   Absolute Eosinophils 0.0 - 0.7 10e3/uL 0.1   Absolute Immature Granulocytes <=0.4 10e3/uL 0.1   Absolute Lymphocytes 0.8 - 5.3 10e3/uL 1.2   Absolute Monocytes 0.0 - 1.3 10e3/uL 0.4   % Immature Granulocytes % 1   Absolute Neutrophils 1.6 - 8.3 10e3/uL 3.5   Absolute NRBCs 10e3/uL 0.0   NRBCs per 100 WBC <1 /100 0     Imaging:   EXAMINATION: CT ABDOMEN WO & W CHEST PELVIS W CONTRAST, 6/8/2022  9:26 AM     TECHNIQUE: Helical CT images from the thoracic inlet through the  symphysis pubis were obtained with intravenous contrast. Contrast  dose: Isovue 370  100 mls     COMPARISON: CT 11/26/2021     HISTORY: hepatocellular carcinoma, s/p liver transplantation. Evaluate  for recurrence; HCC (hepatocellular carcinoma) (H); Liver transplant  recipient (H)     FINDINGS:     Lower neck: Visualized portions of the lower neck and thyroid gland  are unremarkable.     Chest:   Heart/ Mediastinum: Heart is within normal limits. No evidence of  central pulmonary embolism. No bulky lymphadenopathy.  Esophagus  appears normal.  Lungs/pleura: The central tracheobronchial tree is patent.  No focal  mass or consolidation. Atelectasis in the right lung base. No  suspicious nodules. No pneumothorax or pleural effusion.   Chest wall/axilla: No bulky lymphadenopathy..     Abdomen and pelvis:  Hepatobiliary: Post changes of liver transplantation. No focal hepatic  mass. Portal veins, hepatic veins and hepatic arteries are patent.  No  intra or extrahepatic biliary dilatation.. The gall bladder is  surgically absent.     Pancreas: No evidence of pancreatic mass or peripancreatic fluid.     Spleen: Measures 13.2 cm. No focal lesions.     Adrenals: Normal.     Kidneys: No hydronephrosis, calculi or mass.  Urinary bladder: Unremarkable.  Reproductive organs: Prostate and seminal vesicles are unremarkable.     Gastrointestinal:  The stomach and duodenum are unremarkable. Small and  large bowel are normal in caliber and without abnormal wall  thickening. The appendix is normal.  Mesentery/Peritoneum: No ascites or pneumoperitoneum.     Lymph nodes: No lymphadenopathy.  Vasculature: No aortic aneurysm.      Bones and soft tissues: No aggressive lytic or sclerotic lesions.                                                                      IMPRESSION:  1.  Unremarkable transplant liver. No focal hepatic mass.  2.  Sequelae of portal hypertension including splenomegaly and  portosystemic collaterals.   3.  No metastases in the chest.     JESSICA YI MD    Impression/plan:   HCC s/p liver transplantation on 1/11/2019.   -I reviewed CT imaging and labs. There is no evidence of recurrence or new malignancy.  -he is recommended to continue surveillance with CT imaging and labs at 6 month intervals. He will f/u with Dr. Villarreal at that time.    He continues to follow with hepatology, cardiology, nephrology and psychiatry.

## 2022-07-06 DIAGNOSIS — I25.10 CORONARY ARTERY DISEASE INVOLVING NATIVE CORONARY ARTERY OF NATIVE HEART WITHOUT ANGINA PECTORIS: ICD-10-CM

## 2022-07-07 ENCOUNTER — TELEPHONE (OUTPATIENT)
Dept: PHARMACY | Facility: CLINIC | Age: 57
End: 2022-07-07

## 2022-07-07 NOTE — TELEPHONE ENCOUNTER
Follow-up with anemia management service:    Labs have not been drawn this last month. Hgb has been stable since 3/23/22. Will follow up in 1 month. If labs are stable, ok to close Anemia Services.     Anemia Latest Ref Rng & Units 4/6/2022 4/20/2022 4/25/2022 5/4/2022 5/18/2022 6/1/2022 6/8/2022   IVELISSE Dose - - - - - - - -   Hemoglobin 13.3 - 17.7 g/dL 11.5(L) 11.7(L) 11.3(L) 10.8(L) 11.0(L) 11.9(L) 11.8(L)   TSAT 15 - 46 % - - 42 - - - -   Ferritin 26 - 388 ng/mL - - 508(H) - - - -   PRBCs - - - - - - - -           Follow-up call date: 8/4/22    Jie Blum RN   Anemia Services  [address] [address]   [email or web address]   Office : [phone]  Fax: [phone]

## 2022-07-11 RX ORDER — ROSUVASTATIN CALCIUM 5 MG/1
5 TABLET, COATED ORAL DAILY
Qty: 90 TABLET | Refills: 3 | Status: SHIPPED | OUTPATIENT
Start: 2022-07-11 | End: 2023-05-02

## 2022-07-11 NOTE — TELEPHONE ENCOUNTER
rosuvastatin (CRESTOR) 5 MG tablet  3/15/2022  Office visit   90 Tabs, 3 Refills sent to eve Chung RN  Central Triage Red Flags/Med Refills

## 2022-07-12 ENCOUNTER — VIRTUAL VISIT (OUTPATIENT)
Dept: BEHAVIORAL HEALTH | Facility: CLINIC | Age: 57
End: 2022-07-12
Payer: MEDICARE

## 2022-07-12 DIAGNOSIS — F41.1 GENERALIZED ANXIETY DISORDER: ICD-10-CM

## 2022-07-12 DIAGNOSIS — E11.3313 TYPE 2 DIABETES MELLITUS WITH MODERATE NONPROLIFERATIVE RETINOPATHY OF BOTH EYES AND MACULAR EDEMA, UNSPECIFIED WHETHER LONG TERM INSULIN USE (H): Primary | ICD-10-CM

## 2022-07-12 DIAGNOSIS — F31.31 BIPOLAR 1 DISORDER, DEPRESSED, MILD (H): Primary | ICD-10-CM

## 2022-07-12 PROCEDURE — 90832 PSYTX W PT 30 MINUTES: CPT | Mod: 95 | Performed by: PSYCHOLOGIST
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
## 2022-07-13 ENCOUNTER — TELEPHONE (OUTPATIENT)
Dept: TRANSPLANT | Facility: CLINIC | Age: 57
End: 2022-07-13

## 2022-07-13 ENCOUNTER — LAB (OUTPATIENT)
Dept: LAB | Facility: CLINIC | Age: 57
End: 2022-07-13
Payer: MEDICARE

## 2022-07-13 DIAGNOSIS — I50.20 HFREF (HEART FAILURE WITH REDUCED EJECTION FRACTION) (H): ICD-10-CM

## 2022-07-13 DIAGNOSIS — Z13.220 LIPID SCREENING: ICD-10-CM

## 2022-07-13 DIAGNOSIS — R80.1 PERSISTENT PROTEINURIA: ICD-10-CM

## 2022-07-13 DIAGNOSIS — N18.32 STAGE 3B CHRONIC KIDNEY DISEASE (H): ICD-10-CM

## 2022-07-13 DIAGNOSIS — E87.5 HYPERKALEMIA: Primary | ICD-10-CM

## 2022-07-13 DIAGNOSIS — E11.3293 TYPE 2 DIABETES MELLITUS WITH MILD NONPROLIFERATIVE RETINOPATHY OF BOTH EYES WITHOUT MACULAR EDEMA, UNSPECIFIED WHETHER LONG TERM INSULIN USE (H): ICD-10-CM

## 2022-07-13 DIAGNOSIS — Z94.4 LIVER REPLACED BY TRANSPLANT (H): ICD-10-CM

## 2022-07-13 LAB
ALBUMIN SERPL-MCNC: 4.3 G/DL (ref 3.4–5)
ALP SERPL-CCNC: 104 U/L (ref 40–150)
ALT SERPL W P-5'-P-CCNC: 21 U/L (ref 0–70)
ANION GAP SERPL CALCULATED.3IONS-SCNC: 7 MMOL/L (ref 3–14)
AST SERPL W P-5'-P-CCNC: 10 U/L (ref 0–45)
BILIRUB DIRECT SERPL-MCNC: 0.1 MG/DL (ref 0–0.2)
BILIRUB SERPL-MCNC: 0.6 MG/DL (ref 0.2–1.3)
BUN SERPL-MCNC: 37 MG/DL (ref 7–30)
CALCIUM SERPL-MCNC: 9.9 MG/DL (ref 8.5–10.1)
CHLORIDE BLD-SCNC: 108 MMOL/L (ref 94–109)
CO2 SERPL-SCNC: 25 MMOL/L (ref 20–32)
CREAT SERPL-MCNC: 1.83 MG/DL (ref 0.66–1.25)
ERYTHROCYTE [DISTWIDTH] IN BLOOD BY AUTOMATED COUNT: 13.8 % (ref 10–15)
GFR SERPL CREATININE-BSD FRML MDRD: 42 ML/MIN/1.73M2
GLUCOSE BLD-MCNC: 202 MG/DL (ref 70–99)
HCT VFR BLD AUTO: 40.4 % (ref 40–53)
HGB BLD-MCNC: 13 G/DL (ref 13.3–17.7)
MCH RBC QN AUTO: 31.5 PG (ref 26.5–33)
MCHC RBC AUTO-ENTMCNC: 32.2 G/DL (ref 31.5–36.5)
MCV RBC AUTO: 98 FL (ref 78–100)
PLATELET # BLD AUTO: 153 10E3/UL (ref 150–450)
POTASSIUM BLD-SCNC: 6.2 MMOL/L (ref 3.4–5.3)
PROT SERPL-MCNC: 7.8 G/DL (ref 6.8–8.8)
RBC # BLD AUTO: 4.13 10E6/UL (ref 4.4–5.9)
SODIUM SERPL-SCNC: 140 MMOL/L (ref 133–144)
WBC # BLD AUTO: 5.7 10E3/UL (ref 4–11)

## 2022-07-13 PROCEDURE — 85027 COMPLETE CBC AUTOMATED: CPT | Performed by: PATHOLOGY

## 2022-07-13 PROCEDURE — 82248 BILIRUBIN DIRECT: CPT | Performed by: PATHOLOGY

## 2022-07-13 PROCEDURE — 80053 COMPREHEN METABOLIC PANEL: CPT | Performed by: PATHOLOGY

## 2022-07-13 PROCEDURE — 36415 COLL VENOUS BLD VENIPUNCTURE: CPT | Performed by: PATHOLOGY

## 2022-07-13 RX ORDER — LISINOPRIL 5 MG/1
5 TABLET ORAL DAILY
Qty: 30 TABLET | Refills: 11 | COMMUNITY
Start: 2022-07-13 | End: 2022-12-15

## 2022-07-13 NOTE — TELEPHONE ENCOUNTER
Per Dr Banuelos, stop Lisinopril d/t high K 6.2 and to reach out to patient's nephrologist Dr Rao. High priority message sent to Dr Rao and an LPN that works in the nephrology clinic to review and advise. Med list updated.    ADDENDUM:  Per Dr Rao to stop lisinopril and repeat labs next week. Lab appt made for 7/20 at 9:30 AM at [facility]. Patient aware and agrees with plan.

## 2022-07-13 NOTE — TELEPHONE ENCOUNTER
DATE:  7/13/2022     TIME OF RECEIPT FROM LAB:  3:01 PM    ORDERING PROVIDER:     LAB TEST:  Potassium    LAB VALUE:  6.2    RESULTS GIVEN WITH READ-BACK TO (PROVIDER):  Carla Robertson RN    TIME LAB VALUE REPORTED TO PROVIDER:   3:06 PM

## 2022-07-20 ENCOUNTER — LAB (OUTPATIENT)
Dept: LAB | Facility: CLINIC | Age: 57
End: 2022-07-20
Payer: MEDICARE

## 2022-07-20 DIAGNOSIS — E87.5 HYPERKALEMIA: ICD-10-CM

## 2022-07-20 LAB
ALBUMIN SERPL-MCNC: 4.3 G/DL (ref 3.4–5)
ANION GAP SERPL CALCULATED.3IONS-SCNC: 7 MMOL/L (ref 3–14)
BUN SERPL-MCNC: 32 MG/DL (ref 7–30)
CALCIUM SERPL-MCNC: 9.4 MG/DL (ref 8.5–10.1)
CHLORIDE BLD-SCNC: 105 MMOL/L (ref 94–109)
CO2 SERPL-SCNC: 26 MMOL/L (ref 20–32)
CREAT SERPL-MCNC: 1.85 MG/DL (ref 0.66–1.25)
GFR SERPL CREATININE-BSD FRML MDRD: 42 ML/MIN/1.73M2
GLUCOSE BLD-MCNC: 269 MG/DL (ref 70–99)
PHOSPHATE SERPL-MCNC: 3.4 MG/DL (ref 2.5–4.5)
POTASSIUM BLD-SCNC: 4.7 MMOL/L (ref 3.4–5.3)
SODIUM SERPL-SCNC: 138 MMOL/L (ref 133–144)

## 2022-07-20 PROCEDURE — 36415 COLL VENOUS BLD VENIPUNCTURE: CPT | Performed by: PATHOLOGY

## 2022-07-20 PROCEDURE — 80069 RENAL FUNCTION PANEL: CPT | Performed by: PATHOLOGY

## 2022-07-27 ENCOUNTER — OFFICE VISIT (OUTPATIENT)
Dept: OPHTHALMOLOGY | Facility: CLINIC | Age: 57
End: 2022-07-27
Attending: OPHTHALMOLOGY
Payer: MEDICARE

## 2022-07-27 DIAGNOSIS — E11.3313 TYPE 2 DIABETES MELLITUS WITH MODERATE NONPROLIFERATIVE RETINOPATHY OF BOTH EYES AND MACULAR EDEMA, UNSPECIFIED WHETHER LONG TERM INSULIN USE (H): ICD-10-CM

## 2022-07-27 PROCEDURE — 92014 COMPRE OPH EXAM EST PT 1/>: CPT | Performed by: OPHTHALMOLOGY

## 2022-07-27 PROCEDURE — G0463 HOSPITAL OUTPT CLINIC VISIT: HCPCS | Mod: 25

## 2022-07-27 PROCEDURE — 92134 CPTRZ OPH DX IMG PST SGM RTA: CPT | Performed by: OPHTHALMOLOGY

## 2022-07-27 ASSESSMENT — REFRACTION_WEARINGRX
OS_ADD: +2.00
OD_CYLINDER: +0.75
SPECS_TYPE: PAL
OS_AXIS: 044
OS_SPHERE: -6.75
OD_AXIS: 131
OS_CYLINDER: +0.50
OD_ADD: +2.00
OD_SPHERE: -7.00

## 2022-07-27 ASSESSMENT — VISUAL ACUITY
OS_CC+: -2
METHOD: SNELLEN - LINEAR
OD_CC+: -1
OD_CC: 20/30
CORRECTION_TYPE: GLASSES
OS_CC: 20/30

## 2022-07-27 ASSESSMENT — CONF VISUAL FIELD
OS_NORMAL: 1
OD_NORMAL: 1
METHOD: COUNTING FINGERS

## 2022-07-27 ASSESSMENT — TONOMETRY
OS_IOP_MMHG: 17
IOP_METHOD: TONOPEN
OD_IOP_MMHG: 17

## 2022-07-27 ASSESSMENT — CUP TO DISC RATIO
OD_RATIO: 0.25
OS_RATIO: 0.3

## 2022-07-27 ASSESSMENT — SLIT LAMP EXAM - LIDS
COMMENTS: NORMAL
COMMENTS: SMALL PAPILLOMA ALONG LL MARGIN

## 2022-07-27 ASSESSMENT — EXTERNAL EXAM - RIGHT EYE: OD_EXAM: NORMAL

## 2022-07-27 ASSESSMENT — EXTERNAL EXAM - LEFT EYE: OS_EXAM: NORMAL

## 2022-07-27 NOTE — PROGRESS NOTES
CC:  Follow up Diabetic macular edema and retinopathy     HPI: Frandy Workman is a  57 year old year-old patient with history of Diabetes mellitus for 24 ys . Patient on insulin.     Interval History: Vision is stable. occasional blurry vision; no new flashes and floaters   Lab Results   Component Value Date    A1C 6.0 01/10/2022    A1C 6.8 07/13/2021    A1C 6.0 12/30/2020    A1C 6.3 06/13/2020    A1C 6.6 08/08/2018    A1C 6.5 06/09/2017    A1C 7.8 10/25/2016     Retinal Imaging:  OCT 07/27/22   RE: resolved Diabetic macular edema. Vitreous attached. Small intraretinal fluid temporal retina  LE: mild Diabetic macular edema, mild Epiretinal membrane; trace cystic changes     fluorescein angiography transits right eye 3/17/2022 -  Swallowed dye, unable to place IV  Right eye: MA, no macular leakage,  No NV, no significant non-perfusion  Left eye: MA, no macular leakage,  No NV, no significant non-perfusion     Optos consistent with clinical exam    Assessment & Plan:  # Diabetic macular edema both eyes    - status post avastin x 4. Switch to Eylea 4/24/19 with improvement of Diabetic macular edema    - now with resolved Diabetic macular edema both eyes    - Last Eylea injection was 5/2021 both eyes    - plan to observe    # severe nonproliferative diabetic retinopathy of both eyes    - HbA1c 6.3% (6/2020)   - fluorescein angiography 3/17/2022 limited by oral dye. No NVE, no significant non perfusion   - Blood pressure (<120/80) and blood glucose (HbA1c <7.0, ~6.5 today) control discussed with patient. Patient advised that failure to adequately control each may lead to vision loss. The patient expressed understanding.   - Keep excellent sugar control. DFE 4 mo. Yearly FA     #. Senile nuclear sclerosis, bilateral  becoming visually significant both eyes  Patient on flomax   Patient considering Cataract extraction intraocular lens this yr     # Dry eye syndrome   Left eye worse than right eye   warm compresses and  artificial tears  As needed  -punctal plugs previously left eye - reports this is better     # Status post liver transplant   - tx 11/2019   - severe anemia; s/p transfusion - anemia much improved    - being seen by his primary team    Plan: 2 mo DFE, OCT macula, BAT, cataract evaluation; possible intraocular lens calcs   Fluorescein angiography transits right eye (last fluorescein angiography was difficult to get the vein)    ~~~~~~~~~~~~~~~~~~~~~~~~~~~~~~~~~~   Complete documentation of historical and exam elements from today's encounter can be found in the full encounter summary report (not reduplicated in this progress note).  I personally obtained the chief complaint(s) and history of present illness.  I confirmed and edited as necessary the review of systems, past medical/surgical history, family history, social history, and examination findings as documented by others; and I examined the patient myself.  I personally reviewed the relevant tests, images, and reports as documented above.  I personally reviewed the ophthalmic test(s) associated with this encounter, agree with the interpretation(s) as documented by the resident/fellow, and have edited the corresponding report(s) as necessary.   I formulated and edited as necessary the assessment and plan and discussed the findings and management plan with the patient and family    Enriqueta Martin MD   of Ophthalmology.  Retina Service   Department of Ophthalmology and Visual Neurosciences   [facility]  Phone: [phone]   Fax: [phone]

## 2022-07-27 NOTE — NURSING NOTE
Chief Complaints and History of Present Illnesses   Patient presents with     Diabetic Retinopathy Follow Up     Chief Complaint(s) and History of Present Illness(es)     Diabetic Retinopathy Follow Up               Comments     Patient states that his eyes feel itchy and dry. He states that he was using artifical tears, but he ran out. Flashes sometimes at night. Floaters sometimes at night. Blurry vision here and there.     Ocular Meds:artifical tears.  BS:135, fasting this morning  Lab Results       Component                Value               Date                       A1C                      6.0                 01/10/2022                 A1C                      6.8                 07/13/2021                 A1C                      6.0                 12/30/2020                 A1C                      6.3                 06/13/2020                 A1C                      6.6                 08/08/2018                 A1C                      6.5                 06/09/2017                 A1C                      7.8                 10/25/2016                  Arie BERNSTEIN, July 27, 2022 9:21 AM

## 2022-08-02 ENCOUNTER — VIRTUAL VISIT (OUTPATIENT)
Dept: BEHAVIORAL HEALTH | Facility: CLINIC | Age: 57
End: 2022-08-02
Payer: MEDICARE

## 2022-08-02 DIAGNOSIS — F31.31 BIPOLAR 1 DISORDER, DEPRESSED, MILD (H): Primary | ICD-10-CM

## 2022-08-02 DIAGNOSIS — F41.1 GENERALIZED ANXIETY DISORDER: ICD-10-CM

## 2022-08-02 DIAGNOSIS — Z94.4 STATUS POST LIVER TRANSPLANTATION (H): ICD-10-CM

## 2022-08-02 PROCEDURE — 90832 PSYTX W PT 30 MINUTES: CPT | Mod: 95 | Performed by: PSYCHOLOGIST

## 2022-08-02 NOTE — PROGRESS NOTES
[facility]: Integrated Behavioral Health  August 2, 2022          Behavioral Health Clinician Progress Note    Patient Name: Frandy Workman           Service Type: Video visit      Service Location:  Video visit      Session Start Time: 1:00  Session End Time:  1:16      Session Length: 16 - 37      Attendees: Patient    Visit Activities (Refresh list every visit): [facility] Only     Service Modality:  Video Visit:      Provider verified identity through the following two step process.  Patient provided:  Patient photo and Patient is known previously to provider    Telemedicine Visit: The patient's condition can be safely assessed and treated via synchronous audio and visual telemedicine encounter.      Reason for Telemedicine Visit: Patient has requested telehealth visit and Services only offered telehealth    Originating Site (Patient Location): Patient's home    Distant Site (Provider Location): Provider Remote Setting- Home Office    Consent:  The patient/guardian has verbally consented to: the potential risks and benefits of telemedicine (video visit) versus in person care; bill my insurance or make self-payment for services provided; and responsibility for payment of non-covered services.     Patient would like the video invitation sent by:  Send to e-mail at: [email or web address]    Mode of Communication:  Video Conference via [facility]    As the provider I attest to compliance with applicable laws and regulations related to telemedicine.      Diagnostic Assessment Date:  12/03/2020  Treatment Plan Review Date:  9/14/2022  See Flowsheets for today's PHQ-9 and LIZZETTE-7 results  Previous PHQ-9:   PHQ-9 SCORE 5/17/2022 5/23/2022 7/12/2022   PHQ-9 Total Score MyChart 5 (Mild depression) 2 (Minimal depression) 4 (Minimal depression)   PHQ-9 Total Score 5 2 4     Previous LIZZETTE-7:   LIZZETTE-7 SCORE 12/29/2020 4/7/2021 9/30/2021   Total Score 8 (mild anxiety) 9 (mild anxiety) -   Total Score 8 9 10        Extended Session (60+ minutes): No  Interactive Complexity: No  Crisis: No    Treatment Objective(s) Addressed in This Session:  Target Behavior(s): disease management/lifestyle changes  related to adaptive approaches to managing anxious distress and mood difficulties    Increase interest and engagement in doing enjoyable/mastery activities    Current Stressors / Issues:  [facility] met with Miller today for a follow-up. Reports mood and health factors we have discussed are stable and he is feeling better. Notes occasional lethargy from his health symptoms and sleeping a bit more than he would prefer, but he notes this is understandable. Reports mood is feeling stable and upbeat. Notes looking forward to going to a [employer] this weekend with his friend Davi, which this writer supported for him to get out and do things he enjoys. Affirmed the adaptive steps Miller has taken toward health maintenance, both physically and emotionally. He denied concerns to review in detail today; rather he valued check-in on his progress and overall health. Discussed care planning, next steps. Will follow-up in about a month.     Answers for HPI/ROS submitted by the patient on 7/12/2022  If you checked off any problems, how difficult have these problems made it for you to do your work, take care of things at home, or get along with other people?: Somewhat difficult  PHQ9 TOTAL SCORE: 4          Progress on Treatment Objective(s) / Homework:  Stable - MAINTENANCE (Working to maintain change, with risk of relapse); Intervened by continuing to positively reinforce healthy behavior choice       Solution-Focused Therapy    Explore patterns in patient's relationships and discuss options for new behaviors.    Explore patterns in patient's actions and choices and discuss options for new behaviors.    Supportive Psychotherapy    Answers for HPI/ROS submitted by the patient on 3/21/2022  If you checked off any problems, how difficult have these  problems made it for you to do your work, take care of things at home, or get along with other people?: Not difficult at all  PHQ9 TOTAL SCORE: 6      Answers for HPI/ROS submitted by the patient on 2/8/2022  If you checked off any problems, how difficult have these problems made it for you to do your work, take care of things at home, or get along with other people?: Somewhat difficult  PHQ9 TOTAL SCORE: 4      Answers for HPI/ROS submitted by the patient on 4/7/2021   LIZZETTE 7 TOTAL SCORE: 9  If you checked off any problems, how difficult have these problems made it for you to do your work, take care of things at home, or get along with other people?: Very difficult  PHQ9 TOTAL SCORE: 7*    *No SI    Answers for HPI/ROS submitted by the patient on 10/13/2020   If you checked off any problems, how difficult have these problems made it for you to do your work, take care of things at home, or get along with other people?: Somewhat difficult  PHQ9 TOTAL SCORE: 8*  LIZZETTE 7 TOTAL SCORE: 6      *No SI     Answers for HPI/ROS submitted by the patient on 12/29/2020   If you checked off any problems, how difficult have these problems made it for you to do your work, take care of things at home, or get along with other people?: Somewhat difficult  PHQ9 TOTAL SCORE: 5*  LIZZETTE 7 TOTAL SCORE: 8    *No SI       Care Plan review completed: yes    Medication Review:  No changes to current psychiatric medication(s)     Current Outpatient Medications   Medication     Acetaminophen (TYLENOL) 325 MG CAPS     ARIPiprazole (ABILIFY) 5 MG tablet     aspirin (SM ASPIRIN ADULT LOW STRENGTH) 81 MG EC tablet     BD VIKTORIA U/F 32G X 4 MM insulin pen needle     ciclopirox (LOPROX) 0.77 % cream     Continuous Blood Gluc  (FREESTYLE CLAUDIA 14 DAY READER) CECILIO     Continuous Blood Gluc Sensor (FREESTYLE CLAUDIA 2 SENSOR) MISC     cyanocobalamin (VITAMIN B-12) 1000 MCG tablet     empagliflozin (JARDIANCE) 10 MG TABS tablet     insulin aspart (NOVOLOG  PEN) 100 UNIT/ML pen     insulin degludec (TRESIBA FLEXTOUCH) 100 UNIT/ML pen     lamiVUDine (EPIVIR) 100 MG tablet     lisinopril (ZESTRIL) 5 MG tablet     methocarbamol (ROBAXIN) 750 MG tablet     metoprolol succinate ER (TOPROL-XL) 25 MG 24 hr tablet     Multiple Vitamin (TAB-A-GLADIS) TABS     mycophenolate (GENERIC EQUIVALENT) 250 MG capsule     order for DME     pantoprazole (PROTONIX) 40 MG EC tablet     polyethylene glycol (MIRALAX) 17 g packet     predniSONE (DELTASONE) 5 MG tablet     rosuvastatin (CRESTOR) 5 MG tablet     senna-docusate (SENOKOT-S/PERICOLACE) 8.6-50 MG tablet     tamsulosin (FLOMAX) 0.4 MG capsule     vitamin D3 (VITAMIN D3) 50 mcg (2000 units) tablet     Current Facility-Administered Medications   Medication     aflibercept (EYLEA) injection prefilled syringe 2 mg     aflibercept (EYLEA) injection prefilled syringe 2 mg         Medication Compliance:  Yes    Changes in Health Issues:   None reported    Chemical Use Review:   Substance Use: Chemical use reviewed, no active concerns identified      Tobacco Use: No current tobacco use.         Assessment: Current Emotional / Mental Status (status of significant symptoms):  Risk status (Self / Other harm or suicidal ideation)  Patient denies a history of suicidal ideation, suicide attempts, self-injurious behavior, homicidal ideation, homicidal behavior and and other safety concerns     Patient denies current fears or concerns for personal safety.  Patient denies current or recent suicidal ideation or behaviors.  Patient denies current or recent homicidal ideation or behaviors.  Patient denies current or recent self injurious behavior or ideation.  Patient denies other safety concerns.     A safety and risk management plan has not been developed at this time, however patient was encouraged to call [facility] should there be a change in any of these risk factors.    [place] Suicide Severity Rating Scale (Short Version)  [place]  Suicide Severity Rating (Short Version) 11/10/2019 12/2/2019 12/10/2019 6/11/2020 7/1/2020 7/12/2021 1/10/2022   Over the past 2 weeks have you felt down, depressed, or hopeless? no yes yes no no no no   Over the past 2 weeks have you had thoughts of killing yourself? no yes no no no no no   Comments - lots of stressors, has thought about not taking meds - - - - -   Have you ever attempted to kill yourself? no no no no no no no   Q1 Wished to be Dead (Past Month) - other (see comments) no - - - -   Comments - why did i do this surgery - - - - -   Q2 Suicidal Thoughts (Past Month) - no no - - - -   Comments - wishes he wouldn't have gone through surgery - - - - -   Q3 Suicidal Thought Method - no no - - - -   Q4 Suicidal Intent without Specific Plan - no no - - - -   Q5 Suicide Intent with Specific Plan - no no - - - -   Q6 Suicide Behavior (Lifetime) - no no - - - -         Appearance:   Appropriate   Eye Contact:   Good   Psychomotor Behavior: Restless   Attitude:   Cooperative  Interested Friendly Pleasant  Orientation:   All  Speech   Rate / Production: Normal/ Responsive   Volume:  Normal   Mood:    Depressed ; improving  Affect:    Appropriate    Thought Content:  Clear   Thought Form:  Goal Directed  Logical   Insight:    Good     Diagnoses:  1. Bipolar 1 disorder, depressed, mild (H)    2. Generalized anxiety disorder          Collateral Reports Completed:  Not Applicable    Plan: (Homework, other):  Continue with this writer for individual psychotherapy in 1 month. He will continue to work on managing depression and anxiety through achievable behavioral activation ideas.   No CD issues.     Joseph Murillo, ERD  August 2, 2022              ______________________________________________________________________                                            Individual Treatment Plan    Patient's Name: Frandy Workman  YOB: 1965    Date of Creation: 3/1/2022  Date Treatment Plan Last Reviewed/Revised:  6/14/2022  DSM5 Diagnoses: 296.51 Bipolar I Disorder Current or Most Recent Episode Depressed, Mild or 300.02 (F41.1) Generalized Anxiety Disorder  Psychosocial / Contextual Factors: Post Solid Organ Tx  PROMIS (reviewed every 90 days): 4    Referral / Collaboration:  Referral to another professional/service is not indicated at this time..    Anticipated number of session for this episode of care: 10+  Anticipation frequency of session: Every other week  Anticipated Duration of each session: 38-52 minutes  Treatment plan will be reviewed in 90 days or when goals have been changed.       MeasurableTreatment Goal(s) related to diagnosis / functional impairment(s)  Goal 1: Patient will experience a reduction in depressive symptoms along with a corresponding increase in positive emotion and life satisfaction.      Objective #A (Patient Action)    Patient will Increase interest, engagement, and pleasure in doing things.  Status: Continued - Date(s): 6/14/2022    Intervention(s)  Therapist will help patient identify pleasant and mastery oriented events that elicit positive, relaxed mood.    Objective #B  Patient will Decrease frequency and intensity of feeling down, depressed, hopeless.  Status: Continued - Date(s): 6/14/2022    Intervention(s)  Therapist will introduce patient to cognitive-behavioral and acceptance and commitment therapy topics aimed to help reduce depression and anxiety    Objective #C  Patient will Identify negative self-talk and behaviors: challenge core beliefs, myths, and actions  Improve concentration, focus, and mindfulness in daily activities .  Status: Continued - Date(s): 6/14/2022    Intervention(s)  Therapist will help patient identify and manage negative self-talk and automatic thoughts; introduce patient to cognitive distortions; help patient develop cognitive diffusion techniques      Goal 2: Patient will experience a reduction in anxious symptoms, along with a corresponding increase in  "relaxed emotional states and life satisfaction.      Objective #A (Patient Action)  Patient will use cognitive-behavioral and thought diffusion strategies identified in therapy to challenge anxious thoughts.    Status: Continued - Date(s): 6/14/2022    Intervention(s)  Therapist will utilize CBT and ACT ideas to help patient challenge anxious thoughts and reduce intensity/duration of anxious distress    Objective #B  Patient will use \"worry time\" each day for 15 minutes of scheduled worry and then defer obsessive or anxious thinking until the next structured worry time.    Status: Continued - Date(s): 6/14/2022    Intervention(s)  Therapist will teach patient how to effectively utilize worry time and/or thought logs/journals each day and incorporate more relaxation behaviors into their routine.    Objective #C  Patient will identify the stressors which contribute to feelings of anxiety  Patient will increase engagement in adaptive coping skills and recreational activities, such as exercise and healthy socialization, to manage distress.  Status: Continued - Date(s): 6/14/2022    Intervention(s)  Therapist will help patient identify triggers/situational factors that contribute to anxiety and behavioral skills aimed to manage anxious distress.      Goal 3: Patiient will work toward adaptively managing bipolar-related depression and manic episodes      Objective #A (Patient Action)    Status: Continued - Date(s): 6/14/2022    Patient will identify 2-3 signs or signals of emerging mood instability.    Intervention(s)  Therapist will provide educational materials on bipolar disorder and help identifying triggers for mood instability.    Objective #B  Patient will identify 2-3 strategies for more effectively managing Bipolar Disorder.    Status: Continued - Date(s): 6/14/2022    Intervention(s)  Therapist will teach emotional recognition/identification. Skills aimed to effectively manage bipolar disorder.      Other " Possible Therapeutic Intervention(s):    Psycho-education regarding mental health diagnoses and treatment options    Supportive Therapy    Provide affirmations, reflections, and establish working rapport    Emphasize and reflect on strength of therapeutic relationship    Skills training    Explore skills useful to client in current situation.    Skills include assertiveness, communication, conflict management, problem-solving, relaxation, etc.    Solution-Focused Therapy    Explore patterns in patient's relationships and discuss options for new behaviors.    Explore patterns in patient's actions and choices and discuss options for new behaviors.    Cognitive-behavioral Therapy    Discuss common cognitive distortions, identify them in patient's life.    Explore ways to challenge, replace, and act against these cognitions.    Acceptance and Commitment Therapy    Explore and identify important values in patient's life.    Discuss ways to commit to behavioral activation around these values.    Psychodynamic psychotherapy    Discuss patient's emotional dynamics and issues and how they impact behaviors.    Explore patient's history of relationships and how they impact present behaviors.    Explore how to work with and make changes in these schemas and patterns.    Narrative Therapy    Explore the patient's story of his/her life from his/her perspective.    Explore alternate ways of understanding their experience, identifying exceptions, developing new themes.    Interpersonal Psychotherapy    Explore patterns in relationships that are effective or ineffective at helping patient reach their goals, find satisfying experience.    Discuss new patterns or behaviors to engage in for improved social functioning.    Behavioral Activation    Discuss steps patient can take to become more involved in meaningful activity.    Identify barriers to these activities and explore possible solutions.    Mindfulness-Based  Strategies    Discuss skills based on development and application of mindfulness.    Skills drawn from compassion-focused therapy, dialectical behavior therapy, mindfulness-based stress reduction, mindfulness-based cognitive therapy, etc.      Patient has reviewed and agreed to the above plan.      [email or web address] [email or web address],    Behavioral Health Clinician   -[facility]     6/14/2022

## 2022-08-03 ENCOUNTER — TELEPHONE (OUTPATIENT)
Dept: PHARMACY | Facility: CLINIC | Age: 57
End: 2022-08-03

## 2022-08-03 RX ORDER — PREDNISONE 5 MG/1
5 TABLET ORAL DAILY
Qty: 90 TABLET | Refills: 3 | Status: SHIPPED | OUTPATIENT
Start: 2022-08-03 | End: 2022-12-20

## 2022-08-03 NOTE — TELEPHONE ENCOUNTER
Referred by Dr. Eloy Rao on 06/08/2021    Hgb has been stable since 03/23/2022 with no intervention.    Patient meets criteria for discharge from [facility] with at least 12 weeks without iron or IVELISSE therapy.    Anemia Latest Ref Rng & Units 4/20/2022 4/25/2022 5/4/2022 5/18/2022 6/1/2022 6/8/2022 7/13/2022   IVELISSE Dose - - - - - - - -   Hemoglobin 13.3 - 17.7 g/dL 11.7(L) 11.3(L) 10.8(L) 11.0(L) 11.9(L) 11.8(L) 13.0(L)   TSAT 15 - 46 % - 42 - - - - -   Ferritin 26 - 388 ng/mL - 508(H) - - - - -   PRBCs - - - - - - - -       Anemia labs discontinued, therapy plans cancelled, removed from active patient list, and referring provider notified.      Jie Blum RN   Anemia Services  [address] [address]   [email or web address]   Office : [phone]  Fax: [phone]

## 2022-08-23 NOTE — PROGRESS NOTES
Attempted to reached out via phone to the pt on 8/23 to remind them of their visit with Dr. Gonzalez. No answer.  Lynn Lucas

## 2022-08-25 ENCOUNTER — OFFICE VISIT (OUTPATIENT)
Dept: ENDOCRINOLOGY | Facility: CLINIC | Age: 57
End: 2022-08-25
Payer: MEDICARE

## 2022-08-25 DIAGNOSIS — N18.31 TYPE 2 DIABETES MELLITUS WITH STAGE 3A CHRONIC KIDNEY DISEASE, WITH LONG-TERM CURRENT USE OF INSULIN (H): ICD-10-CM

## 2022-08-25 DIAGNOSIS — E11.69 TYPE 2 DIABETES MELLITUS WITH DIABETIC FOOT DEFORMITY (H): ICD-10-CM

## 2022-08-25 DIAGNOSIS — E11.22 TYPE 2 DIABETES MELLITUS WITH STAGE 3A CHRONIC KIDNEY DISEASE, WITH LONG-TERM CURRENT USE OF INSULIN (H): ICD-10-CM

## 2022-08-25 DIAGNOSIS — Z79.4 TYPE 2 DIABETES MELLITUS WITH STAGE 3A CHRONIC KIDNEY DISEASE, WITH LONG-TERM CURRENT USE OF INSULIN (H): ICD-10-CM

## 2022-08-25 DIAGNOSIS — L60.2 ONYCHAUXIS: ICD-10-CM

## 2022-08-25 DIAGNOSIS — E11.49 TYPE II OR UNSPECIFIED TYPE DIABETES MELLITUS WITH NEUROLOGICAL MANIFESTATIONS, NOT STATED AS UNCONTROLLED(250.60) (H): Primary | ICD-10-CM

## 2022-08-25 DIAGNOSIS — M21.969 TYPE 2 DIABETES MELLITUS WITH DIABETIC FOOT DEFORMITY (H): ICD-10-CM

## 2022-08-25 PROCEDURE — 99214 OFFICE O/P EST MOD 30 MIN: CPT | Mod: 25 | Performed by: PODIATRIST

## 2022-08-25 PROCEDURE — 11719 TRIM NAIL(S) ANY NUMBER: CPT | Performed by: PODIATRIST

## 2022-08-25 RX ORDER — AMMONIUM LACTATE 12 G/100G
CREAM TOPICAL 2 TIMES DAILY
Qty: 140 G | Refills: 5 | Status: SHIPPED | OUTPATIENT
Start: 2022-08-25 | End: 2023-08-10

## 2022-08-25 ASSESSMENT — ENCOUNTER SYMPTOMS
EYE WATERING: 0
DOUBLE VISION: 0
EYE PAIN: 0
EYE IRRITATION: 0
EYE REDNESS: 0

## 2022-08-25 NOTE — LETTER
8/25/2022       RE: Frandy Workman  [address] [facility] [address]     Dear Colleague,    Thank you for referring your patient, Frandy Workman, to the [facility] ENDOCRINOLOG[facility] [place] [facility]. Please see a copy of my visit note below.    Attempted to reached out via phone to the pt on 8/23 to remind them of their visit with Dr. Gonzalez. No answer.  Lynn Lucas         Past Medical History:   Diagnosis Date     Anemia 2013     Arthritis      BPH (benign prostatic hyperplasia)      CAD (coronary artery disease) 4/1/2019     Cholelithiasis      Conductive hearing loss 08/16/2017     Depressive disorder 1986    Suffer effects throughout life     Gastroesophageal reflux disease 12/01/2014     HCC (hepatocellular carcinoma) (H) 1/22/2019     History of diabetic retinopathy 07/2018     HTN (hypertension)      HTN (hypertension) 11/20/2019     Hyperlipidemia      Liver cirrhosis secondary to ESTRADA (H)      Liver transplanted (H) 11/11/2019     Portal vein thrombosis 4/11/2019     Type II diabetes mellitus (H)      Patient Active Problem List   Diagnosis     Bipolar affective disorder in remission (H)     Esophageal varices determined by endoscopy (H)     Brow ptosis     Paralytic lagophthalmos of right upper eyelid     Erectile dysfunction due to diseases classified elsewhere     HCC (hepatocellular carcinoma) (H)     Equivocal stress echocardiogram     Type 2 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, unspecified whether long term insulin use (H)     Status post coronary angiogram     CAD (coronary artery disease)     Liver transplant recipient (H)     Status post liver transplantation (H)     Immunosuppressed status (H)     Benign essential hypertension     Malnutrition related to chronic disease (H)     Hypophosphatasia     Chronic kidney disease, stage 3a (H)     Malnutrition (H)      Anxiety     Mild recurrent major depression (H)     Anemia of chronic renal failure, stage 3a (H)     Rekha (H)     History of coronary artery disease     Dyslipidemia     Constipation, unspecified constipation type     Excessive sweating     Stage 3b chronic kidney disease (H)     Anemia of chronic renal failure, stage 3b (H)     NSTEMI (non-ST elevated myocardial infarction) (H)     Chest pain, unspecified type     Past Surgical History:   Procedure Laterality Date     BYPASS GRAFT ARTERY CORONARY N/A 7/14/2021    Procedure: median sternotomy, on cardiopulmonary bypass, CORONARY ARTERY BYPASS GRAFT (CABG) x2 with left greater saphenous vein endoscopic harvest and left internal mammery artery harvest;  Surgeon: Tom Zapata MD;  Location: [facility]     COLONOSCOPY      2015     COLONOSCOPY N/A 12/6/2019    Procedure: COLONOSCOPY, WITH POLYPECTOMY AND BIOPSY;  Surgeon: Adam Morton MD;  Location:  GI     CV CENTRAL VENOUS CATHETER PLACEMENT N/A 7/12/2021    Procedure: Central Venous Catheter Placement;  Surgeon: Fermin Polanco MD;  Location:  HEART CARDIAC CATH LAB     CV CORONARY ANGIOGRAM N/A 7/12/2021    Procedure: Coronary Angiogram;  Surgeon: Fermin Polanco MD;  Location:  HEART CARDIAC CATH LAB     CV HEART CATHETERIZATION WITH POSSIBLE INTERVENTION N/A 2/26/2019    Procedure: CORS;  Surgeon: Jagdish Hoyt MD;  Location:  HEART CARDIAC CATH LAB     CV INTRA AORTIC BALLOON N/A 7/12/2021    Procedure: Intra Aortic Balloon Pump Insertion;  Surgeon: Fermin Polanco MD;  Location: [facility] CARDIAC CATH LAB     ESOPHAGOSCOPY, GASTROSCOPY, DUODENOSCOPY (EGD), COMBINED N/A 11/17/2016    Procedure: COMBINED ESOPHAGOSCOPY, GASTROSCOPY, DUODENOSCOPY (EGD);  Surgeon: Santi Rosas MD;  Location:  GI     ESOPHAGOSCOPY, GASTROSCOPY, DUODENOSCOPY (EGD), COMBINED N/A 11/17/2017    Procedure: COMBINED ESOPHAGOSCOPY, GASTROSCOPY,  DUODENOSCOPY (EGD);  EGD;  Surgeon: Santi Rosas MD;  Location:  GI     ESOPHAGOSCOPY, GASTROSCOPY, DUODENOSCOPY (EGD), COMBINED N/A 2018    Procedure: EGD;  Surgeon: Santi Rosas MD;  [facility]     ESOPHAGOSCOPY, GASTROSCOPY, DUODENOSCOPY (EGD), COMBINED N/A 2019    Procedure: ESOPHAGOGASTRODUODENOSCOPY, WITH BIOPSY;  Surgeon: Adam Morton MD;  Location:  GI     ESOPHAGOSCOPY, GASTROSCOPY, DUODENOSCOPY (EGD), COMBINED N/A 2020    Procedure: ESOPHAGOGASTRODUODENOSCOPY (EGD);  Surgeon: Santi Rosas MD;  Location:  GI     HEAD & NECK SURGERY      2017 at [facility].      IMPLANT GOLD WEIGHT EYELID Right 2017    Procedure: IMPLANT WEIGHT EYELID;  Right Upper Eyelid Weight, right tarsal strip lower eyelid;  Surgeon: Milana Malave MD;  [facility]     IR CHEMO EMBOLIZATION  2019     KNEE SURGERY Left      ORTHOPEDIC SURGERY       PAROTIDECTOMY, RADICAL NECK DISSECTION Right 2017    Procedure: PAROTIDECTOMY, RADICAL NECK DISSECTION;  Right Superfacial Parotidectomy , Facial nerve repair. with [facility] facial nerve monitor.;  Surgeon: Asiya Morgan MD;  Location: [facility] OR     PICC INSERTION Left 2017    4fr SL BioFlo PICC, 44cm in the L basilic vein w/ tip in the low SVC     RETURN LIVER TRANSPLANT N/A 2019    Procedure: Exploratory laparotomy, hematoma evacuation, abdominal washout;  Surgeon: Александр Ramos MD;  Location: [facility]     TRANSPLANT LIVER RECIPIENT  DONOR N/A 2019    Procedure: TRANSPLANT, LIVER, RECIPIENT,  DONOR;  Surgeon: Александр Ramos MD;  Location: [facility] OR     VASCULAR SURGERY       Social History     Socioeconomic History     Marital status:      Spouse name: Not on file     Number of children: Not on file     Years of education: Not on file     Highest education level: Bachelor's degree (e.g., BA, AB, BS)   Occupational History     Not on file   Tobacco Use     Smoking  status: Former Smoker     Packs/day: 6.00     Years: 30.00     Pack years: 180.00     Types: Cigars     Start date: 2016     Quit date: 10/25/2017     Years since quittin.8     Smokeless tobacco: Former User     Types: Chew     Quit date: 10/31/2017     Tobacco comment: 1 tin per week   Substance and Sexual Activity     Alcohol use: No     Alcohol/week: 0.0 standard drinks     Comment: quit 1996     Drug use: No     Sexual activity: Not Currently     Partners: Female     Birth control/protection: Condom   Other Topics Concern     Parent/sibling w/ CABG, MI or angioplasty before 65F 55M? Yes   Social History Narrative    Prior , [facility]     Social Determinants of Health     Financial Resource Strain: Low Risk      Difficulty of Paying Living Expenses: Not very hard   Food Insecurity: No Food Insecurity     Worried About Running Out of Food in the Last Year: Never true     Ran Out of Food in the Last Year: Never true   Transportation Needs: No Transportation Needs     Lack of Transportation (Medical): No     Lack of Transportation (Non-Medical): No   Physical Activity: Not on file   Stress: Not on file   Social Connections: Not on file   Intimate Partner Violence: Not At Risk     Fear of Current or Ex-Partner: No     Emotionally Abused: No     Physically Abused: No     Sexually Abused: No   Housing Stability: Not on file     Family History   Problem Relation Age of Onset     Prostate Cancer Maternal Grandfather      Substance Abuse Maternal Grandfather         Alcohol     Colon Cancer Father 60     Pancreatic Cancer Father 60     Prostate Cancer Father      Colorectal Cancer Father      Macular Degeneration Father      Cancer Father      Glaucoma Father      Skin Cancer Father      Colorectal Cancer Maternal Grandmother      Cancer Maternal Grandmother      Substance Abuse Maternal Grandmother         Alcohol     Colorectal Cancer Paternal Grandmother      Cancer Mother      Diabetes Mother           3/2016     Cerebrovascular Disease Mother         Passed away in Feb of this year, 80 years old.     Thyroid Disease Mother      Depression Mother      Asthma Sister         Had since birth     Thyroid Disease Sister      Depression Sister      Liver Disease No family hx of      Melanoma No family hx of      Lab Results   Component Value Date    A1C 6.0 01/10/2022    A1C 6.8 2021    A1C 6.0 2020    A1C 6.3 2020    A1C 6.6 2018    A1C 6.5 2017    A1C 7.8 10/25/2016       Answers for HPI/ROS submitted by the patient on 2022  General Symptoms: No  Skin Symptoms: No  HENT Symptoms: No  EYE SYMPTOMS: Yes  HEART SYMPTOMS: No  LUNG SYMPTOMS: No  INTESTINAL SYMPTOMS: No  URINARY SYMPTOMS: No  REPRODUCTIVE SYMPTOMS: No  SKELETAL SYMPTOMS: No  BLOOD SYMPTOMS: No  NERVOUS SYSTEM SYMPTOMS: No  MENTAL HEALTH SYMPTOMS: No  Eye pain: No  Vision loss: Yes  Dry eyes: Yes  Watery eyes: No  Eye bulging: No  Double vision: No  Flashing of lights: No  Spots: Yes  Floaters: Yes  Redness: No  Crossed eyes: No  Tunnel Vision: No  Yellowing of eyes: No  Eye irritation: No            SUBJECTIVE FINDINGS:  A 57-year-old male returns to clinic for foot check.  He relates he is doing okay.  Relates he has numbness, tingling and neuropathy in his toes.  Relates no ulcers or sores since we have seen him last.  Relates he needs his toenails trimmed.  No other specific relieving or aggravating factors.  Relates his Diabetic shoes that he wears are doing well.  Relates he is out of Loprox.       OBJECTIVE FINDINGS:  DP and PT 2/4 bilaterally.  He has dorsally contracted digits 2-5 bilaterally.  He has incurvated nails with minimal thickening and dystrophy 1-5 bilaterally to differing degrees.  There is no erythema, no drainage, no odor, no calor bilaterally.  There are no gross tendon voids bilaterally.  He has a laterally deviated hallux bilaterally.       ASSESSMENT/PLAN:  Onychauxis bilaterally.   He is diabetic with peripheral Neuropathy.  His protective sensation is intact.  Diagnosis and treatment options discussed with him.  All the nails were reduced bilaterally upon consent.  Continue Diabetic shoes.  Discontinue Loprox.  Prescription for Amlactin given and use discussed with him.  Return to clinic and see me in about 3 months.  Previous notes reviewed.               Moderate level of medical decision making.         Again, thank you for allowing me to participate in the care of your patient.      Sincerely,    Lamin Gonzalez DPM

## 2022-08-25 NOTE — PROGRESS NOTES
Past Medical History:   Diagnosis Date     Anemia 2013     Arthritis      BPH (benign prostatic hyperplasia)      CAD (coronary artery disease) 4/1/2019     Cholelithiasis      Conductive hearing loss 08/16/2017     Depressive disorder 1986    Suffer effects throughout life     Gastroesophageal reflux disease 12/01/2014     HCC (hepatocellular carcinoma) (H) 1/22/2019     History of diabetic retinopathy 07/2018     HTN (hypertension)      HTN (hypertension) 11/20/2019     Hyperlipidemia      Liver cirrhosis secondary to ESTRADA (H)      Liver transplanted (H) 11/11/2019     Portal vein thrombosis 4/11/2019     Type II diabetes mellitus (H)      Patient Active Problem List   Diagnosis     Bipolar affective disorder in remission (H)     Esophageal varices determined by endoscopy (H)     Brow ptosis     Paralytic lagophthalmos of right upper eyelid     Erectile dysfunction due to diseases classified elsewhere     HCC (hepatocellular carcinoma) (H)     Equivocal stress echocardiogram     Type 2 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, unspecified whether long term insulin use (H)     Status post coronary angiogram     CAD (coronary artery disease)     Liver transplant recipient (H)     Status post liver transplantation (H)     Immunosuppressed status (H)     Benign essential hypertension     Malnutrition related to chronic disease (H)     Hypophosphatasia     Chronic kidney disease, stage 3a (H)     Malnutrition (H)     Anxiety     Mild recurrent major depression (H)     Anemia of chronic renal failure, stage 3a (H)     Rekha (H)     History of coronary artery disease     Dyslipidemia     Constipation, unspecified constipation type     Excessive sweating     Stage 3b chronic kidney disease (H)     Anemia of chronic renal failure, stage 3b (H)     NSTEMI (non-ST elevated myocardial infarction) (H)     Chest pain, unspecified type     Past Surgical History:   Procedure  Laterality Date     BYPASS GRAFT ARTERY CORONARY N/A 7/14/2021    Procedure: median sternotomy, on cardiopulmonary bypass, CORONARY ARTERY BYPASS GRAFT (CABG) x2 with left greater saphenous vein endoscopic harvest and left internal mammery artery harvest;  Surgeon: Tom Zapata MD;  Location: [facility] OR     COLONOSCOPY      2015     COLONOSCOPY N/A 12/6/2019    Procedure: COLONOSCOPY, WITH POLYPECTOMY AND BIOPSY;  Surgeon: Adam Morton MD;  Location: [facility] GI     CV CENTRAL VENOUS CATHETER PLACEMENT N/A 7/12/2021    Procedure: Central Venous Catheter Placement;  Surgeon: Fermin Polanco MD;  [facility]     CV CORONARY ANGIOGRAM N/A 7/12/2021    Procedure: Coronary Angiogram;  Surgeon: Fermin Polanco MD;  Location:  HEART CARDIAC CATH LAB     CV HEART CATHETERIZATION WITH POSSIBLE INTERVENTION N/A 2/26/2019    Procedure: CORS;  Surgeon: Jagdish Hoyt MD;  Location:  HEART CARDIAC CATH LAB     CV INTRA AORTIC BALLOON N/A 7/12/2021    Procedure: Intra Aortic Balloon Pump Insertion;  Surgeon: Fermin Polanco MD;  Location:  HEART CARDIAC CATH LAB     ESOPHAGOSCOPY, GASTROSCOPY, DUODENOSCOPY (EGD), COMBINED N/A 11/17/2016    Procedure: COMBINED ESOPHAGOSCOPY, GASTROSCOPY, DUODENOSCOPY (EGD);  Surgeon: Santi Rosas MD;  Location: [facility]     ESOPHAGOSCOPY, GASTROSCOPY, DUODENOSCOPY (EGD), COMBINED N/A 11/17/2017    Procedure: COMBINED ESOPHAGOSCOPY, GASTROSCOPY, DUODENOSCOPY (EGD);  EGD;  Surgeon: Santi Rosas MD;  Location: [facility] GI     ESOPHAGOSCOPY, GASTROSCOPY, DUODENOSCOPY (EGD), COMBINED N/A 12/28/2018    Procedure: EGD;  Surgeon: Santi Rosas MD;  Location:  OR     ESOPHAGOSCOPY, GASTROSCOPY, DUODENOSCOPY (EGD), COMBINED N/A 12/6/2019    Procedure: ESOPHAGOGASTRODUODENOSCOPY, WITH BIOPSY;  Surgeon: Adam Morton MD;  [facility]     ESOPHAGOSCOPY, GASTROSCOPY, DUODENOSCOPY (EGD),  COMBINED N/A 2020    Procedure: ESOPHAGOGASTRODUODENOSCOPY (EGD);  Surgeon: Santi Rosas MD;  [facility]     HEAD & NECK SURGERY      2017 at [facility].      IMPLANT GOLD WEIGHT EYELID Right 2017    Procedure: IMPLANT WEIGHT EYELID;  Right Upper Eyelid Weight, right tarsal strip lower eyelid;  Surgeon: Milana Malave MD;  [facility]     IR CHEMO EMBOLIZATION  2019     KNEE SURGERY Left      ORTHOPEDIC SURGERY       PAROTIDECTOMY, RADICAL NECK DISSECTION Right 2017    Procedure: PAROTIDECTOMY, RADICAL NECK DISSECTION;  Right Superfacial Parotidectomy , Facial nerve repair. with [facility] facial nerve monitor.;  Surgeon: Asiya Morgan MD;  [facility]: [facility]     PICC INSERTION Left 2017    4fr SL BioFlo PICC, 44cm in the L basilic vein w/ tip in the low SVC     RETURN LIVER TRANSPLANT N/A 2019    Procedure: Exploratory laparotomy, hematoma evacuation, abdominal washout;  Surgeon: Александр Ramos MD;  [facility]     TRANSPLANT LIVER RECIPIENT  DONOR N/A 2019    Procedure: TRANSPLANT, LIVER, RECIPIENT,  DONOR;  Surgeon: Александр Ramos MD;  Location: [facility]     VASCULAR SURGERY       Social History     Socioeconomic History     Marital status:      Spouse name: Not on file     Number of children: Not on file     Years of education: Not on file     Highest education level: Bachelor's degree (e.g., BA, AB, BS)   Occupational History     Not on file   Tobacco Use     Smoking status: Former Smoker     Packs/day: 6.00     Years: 30.00     Pack years: 180.00     Types: Cigars     Start date: 2016     Quit date: 10/25/2017     Years since quittin.8     Smokeless tobacco: Former User     Types: Chew     Quit date: 10/31/2017     Tobacco comment: 1 tin per week   Substance and Sexual Activity     Alcohol use: No     Alcohol/week: 0.0 standard drinks     Comment: quit 1996     Drug use: No     Sexual activity: Not Currently      Partners: Female     Birth control/protection: Condom   Other Topics Concern     Parent/sibling w/ CABG, MI or angioplasty before 65F 55M? Yes   Social History Narrative    [facility]     Social Determinants of Health     Financial Resource Strain: Low Risk      Difficulty of Paying Living Expenses: Not very hard   Food Insecurity: No Food Insecurity     Worried About Running Out of Food in the Last Year: Never true     Ran Out of Food in the Last Year: Never true   Transportation Needs: No Transportation Needs     Lack of Transportation (Medical): No     Lack of Transportation (Non-Medical): No   Physical Activity: Not on file   Stress: Not on file   Social Connections: Not on file   Intimate Partner Violence: Not At Risk     Fear of Current or Ex-Partner: No     Emotionally Abused: No     Physically Abused: No     Sexually Abused: No   Housing Stability: Not on file     Family History   Problem Relation Age of Onset     Prostate Cancer Maternal Grandfather      Substance Abuse Maternal Grandfather         Alcohol     Colon Cancer Father 60     Pancreatic Cancer Father 60     Prostate Cancer Father      Colorectal Cancer Father      Macular Degeneration Father      Cancer Father      Glaucoma Father      Skin Cancer Father      Colorectal Cancer Maternal Grandmother      Cancer Maternal Grandmother      Substance Abuse Maternal Grandmother         Alcohol     Colorectal Cancer Paternal Grandmother      Cancer Mother      Diabetes Mother          3/2016     Cerebrovascular Disease Mother         Passed away in Feb of this year, 80 years old.     Thyroid Disease Mother      Depression Mother      Asthma Sister         Had since birth     Thyroid Disease Sister      Depression Sister      Liver Disease No family hx of      Melanoma No family hx of      Lab Results   Component Value Date    A1C 6.0 01/10/2022    A1C 6.8 2021    A1C 6.0 2020    A1C 6.3 2020    A1C 6.6  08/08/2018    A1C 6.5 06/09/2017    A1C 7.8 10/25/2016       Answers for HPI/ROS submitted by the patient on 8/25/2022  General Symptoms: No  Skin Symptoms: No  HENT Symptoms: No  EYE SYMPTOMS: Yes  HEART SYMPTOMS: No  LUNG SYMPTOMS: No  INTESTINAL SYMPTOMS: No  URINARY SYMPTOMS: No  REPRODUCTIVE SYMPTOMS: No  SKELETAL SYMPTOMS: No  BLOOD SYMPTOMS: No  NERVOUS SYSTEM SYMPTOMS: No  MENTAL HEALTH SYMPTOMS: No  Eye pain: No  Vision loss: Yes  Dry eyes: Yes  Watery eyes: No  Eye bulging: No  Double vision: No  Flashing of lights: No  Spots: Yes  Floaters: Yes  Redness: No  Crossed eyes: No  Tunnel Vision: No  Yellowing of eyes: No  Eye irritation: No            SUBJECTIVE FINDINGS:  A 57-year-old male returns to clinic for foot check.  He relates he is doing okay.  Relates he has numbness, tingling and neuropathy in his toes.  Relates no ulcers or sores since we have seen him last.  Relates he needs his toenails trimmed.  No other specific relieving or aggravating factors.  Relates his Diabetic shoes that he wears are doing well.  Relates he is out of Loprox.       OBJECTIVE FINDINGS:  DP and PT 2/4 bilaterally.  He has dorsally contracted digits 2-5 bilaterally.  He has incurvated nails with minimal thickening and dystrophy 1-5 bilaterally to differing degrees.  There is no erythema, no drainage, no odor, no calor bilaterally.  There are no gross tendon voids bilaterally.  He has a laterally deviated hallux bilaterally.       ASSESSMENT/PLAN:  Onychauxis bilaterally.  He is diabetic with peripheral Neuropathy.  His protective sensation is intact.  Diagnosis and treatment options discussed with him.  All the nails were reduced bilaterally upon consent.  Continue Diabetic shoes.  Discontinue Loprox.  Prescription for Amlactin given and use discussed with him.  Return to clinic and see me in about 3 months.  Previous notes reviewed.               Moderate level of medical decision making.

## 2022-08-27 DIAGNOSIS — Z95.1 S/P CABG (CORONARY ARTERY BYPASS GRAFT): ICD-10-CM

## 2022-08-30 DIAGNOSIS — Z94.4 LIVER TRANSPLANT RECIPIENT (H): ICD-10-CM

## 2022-08-30 DIAGNOSIS — N18.31 CHRONIC KIDNEY DISEASE, STAGE 3A (H): ICD-10-CM

## 2022-08-30 NOTE — TELEPHONE ENCOUNTER
aspirin (SM ASPIRIN ADULT LOW STRENGTH) 81 MG EC tablet  Last Written Prescription Date:   3/30/2022  Last Fill Quantity: 180,   # refills: 1  Last Office Visit : 12/7/2021  Future Office visit:  None  180 Tabs, 1 Refill sent to pharm 8/30/2022      Marsha Chung RN  Central Triage Red Flags/Med Refills

## 2022-08-31 ENCOUNTER — TELEPHONE (OUTPATIENT)
Dept: TRANSPLANT | Facility: CLINIC | Age: 57
End: 2022-08-31

## 2022-08-31 RX ORDER — LANOLIN ALCOHOL/MO/W.PET/CERES
CREAM (GRAM) TOPICAL
Qty: 30 TABLET | Refills: 11 | OUTPATIENT
Start: 2022-08-31

## 2022-09-02 RX ORDER — MULTIVITAMIN WITH FOLIC ACID 400 MCG
1 TABLET ORAL DAILY
Qty: 90 TABLET | Refills: 0 | Status: SHIPPED | OUTPATIENT
Start: 2022-09-02 | End: 2022-12-22

## 2022-09-06 DIAGNOSIS — N18.32 CHRONIC KIDNEY DISEASE, STAGE 3B (H): Primary | ICD-10-CM

## 2022-09-07 ENCOUNTER — LAB (OUTPATIENT)
Dept: LAB | Facility: CLINIC | Age: 57
End: 2022-09-07
Payer: MEDICARE

## 2022-09-07 DIAGNOSIS — N18.32 CHRONIC KIDNEY DISEASE, STAGE 3B (H): ICD-10-CM

## 2022-09-07 LAB
ALBUMIN MFR UR ELPH: 51.2 MG/DL
ALBUMIN SERPL BCG-MCNC: 4.5 G/DL (ref 3.5–5.2)
ANION GAP SERPL CALCULATED.3IONS-SCNC: 10 MMOL/L (ref 7–15)
BUN SERPL-MCNC: 32.9 MG/DL (ref 6–20)
CALCIUM SERPL-MCNC: 10.1 MG/DL (ref 8.6–10)
CHLORIDE SERPL-SCNC: 103 MMOL/L (ref 98–107)
CREAT SERPL-MCNC: 1.47 MG/DL (ref 0.67–1.17)
CREAT UR-MCNC: 86.4 MG/DL
CREAT UR-MCNC: 87.5 MG/DL
DEPRECATED HCO3 PLAS-SCNC: 27 MMOL/L (ref 22–29)
ERYTHROCYTE [DISTWIDTH] IN BLOOD BY AUTOMATED COUNT: 14.6 % (ref 10–15)
GFR SERPL CREATININE-BSD FRML MDRD: 55 ML/MIN/1.73M2
GLUCOSE SERPL-MCNC: 126 MG/DL (ref 70–99)
HCT VFR BLD AUTO: 40.3 % (ref 40–53)
HGB BLD-MCNC: 12.8 G/DL (ref 13.3–17.7)
MCH RBC QN AUTO: 31.4 PG (ref 26.5–33)
MCHC RBC AUTO-ENTMCNC: 31.8 G/DL (ref 31.5–36.5)
MCV RBC AUTO: 99 FL (ref 78–100)
MICROALBUMIN UR-MCNC: 298 MG/L
MICROALBUMIN/CREAT UR: 340.57 MG/G CR (ref 0–17)
PHOSPHATE SERPL-MCNC: 3.4 MG/DL (ref 2.5–4.5)
PLATELET # BLD AUTO: 119 10E3/UL (ref 150–450)
POTASSIUM SERPL-SCNC: 4.7 MMOL/L (ref 3.4–5.3)
PROT/CREAT 24H UR: 0.59 MG/MG CR (ref 0–0.2)
RBC # BLD AUTO: 4.08 10E6/UL (ref 4.4–5.9)
SODIUM SERPL-SCNC: 140 MMOL/L (ref 136–145)
WBC # BLD AUTO: 4.7 10E3/UL (ref 4–11)

## 2022-09-07 PROCEDURE — 84100 ASSAY OF PHOSPHORUS: CPT | Performed by: PATHOLOGY

## 2022-09-07 PROCEDURE — 80051 ELECTROLYTE PANEL: CPT | Performed by: PATHOLOGY

## 2022-09-07 PROCEDURE — 82043 UR ALBUMIN QUANTITATIVE: CPT | Performed by: INTERNAL MEDICINE

## 2022-09-07 PROCEDURE — 84156 ASSAY OF PROTEIN URINE: CPT | Performed by: PATHOLOGY

## 2022-09-07 PROCEDURE — 85027 COMPLETE CBC AUTOMATED: CPT | Performed by: PATHOLOGY

## 2022-09-07 PROCEDURE — 82310 ASSAY OF CALCIUM: CPT | Performed by: PATHOLOGY

## 2022-09-07 PROCEDURE — 84520 ASSAY OF UREA NITROGEN: CPT | Performed by: PATHOLOGY

## 2022-09-07 PROCEDURE — 82040 ASSAY OF SERUM ALBUMIN: CPT | Performed by: PATHOLOGY

## 2022-09-07 PROCEDURE — 82565 ASSAY OF CREATININE: CPT | Performed by: PATHOLOGY

## 2022-09-07 PROCEDURE — 36415 COLL VENOUS BLD VENIPUNCTURE: CPT | Performed by: PATHOLOGY

## 2022-09-08 ENCOUNTER — VIRTUAL VISIT (OUTPATIENT)
Dept: BEHAVIORAL HEALTH | Facility: CLINIC | Age: 57
End: 2022-09-08
Payer: MEDICARE

## 2022-09-08 DIAGNOSIS — F31.31 BIPOLAR 1 DISORDER, DEPRESSED, MILD (H): Primary | ICD-10-CM

## 2022-09-08 DIAGNOSIS — F41.1 GENERALIZED ANXIETY DISORDER: ICD-10-CM

## 2022-09-08 PROCEDURE — 90832 PSYTX W PT 30 MINUTES: CPT | Mod: 95 | Performed by: PSYCHOLOGIST

## 2022-09-08 ASSESSMENT — PATIENT HEALTH QUESTIONNAIRE - PHQ9
10. IF YOU CHECKED OFF ANY PROBLEMS, HOW DIFFICULT HAVE THESE PROBLEMS MADE IT FOR YOU TO DO YOUR WORK, TAKE CARE OF THINGS AT HOME, OR GET ALONG WITH OTHER PEOPLE: SOMEWHAT DIFFICULT
SUM OF ALL RESPONSES TO PHQ QUESTIONS 1-9: 3
SUM OF ALL RESPONSES TO PHQ QUESTIONS 1-9: 3

## 2022-09-08 NOTE — PROGRESS NOTES
[facility]: Integrated Behavioral Health  September 8, 2022        Behavioral Health Clinician Progress Note    Patient Name: Frandy Workman           Service Type: Video visit      Service Location:  Video visit      Session Start Time: 3:04  Session End Time:  3:24      Session Length: 16 - 37      Attendees: Patient    Visit Activities (Refresh list every visit): [facility] Only     Service Modality:  Video Visit:      Provider verified identity through the following two step process.  Patient provided:  Patient photo and Patient is known previously to provider    Telemedicine Visit: The patient's condition can be safely assessed and treated via synchronous audio and visual telemedicine encounter.      Reason for Telemedicine Visit: Patient has requested telehealth visit and Services only offered telehealth    Originating Site (Patient Location): Patient's home    Distant Site (Provider Location): Provider Remote Setting- Home Office    Consent:  The patient/guardian has verbally consented to: the potential risks and benefits of telemedicine (video visit) versus in person care; bill my insurance or make self-payment for services provided; and responsibility for payment of non-covered services.     Patient would like the video invitation sent by:  Send to e-mail at: [email or web address]    Mode of Communication:  Video Conference via [facility]    As the provider I attest to compliance with applicable laws and regulations related to telemedicine.      Diagnostic Assessment Date:  12/03/2020  Treatment Plan Review Date:  9/14/2022  See Flowsheets for today's PHQ-9 and LIZZETTE-7 results  Previous PHQ-9:   PHQ-9 SCORE 5/23/2022 7/12/2022 9/8/2022   PHQ-9 Total Score MyChart 2 (Minimal depression) 4 (Minimal depression) 3 (Minimal depression)   PHQ-9 Total Score 2 4 3     Previous LIZZETTE-7:   LIZZETTE-7 SCORE 12/29/2020 4/7/2021 9/30/2021   Total Score 8 (mild anxiety) 9 (mild anxiety) -   Total Score 8 9  10       Extended Session (60+ minutes): No  Interactive Complexity: No  Crisis: No    Treatment Objective(s) Addressed in This Session:  Target Behavior(s): disease management/lifestyle changes  related to adaptive approaches to managing anxious distress and mood difficulties    Increase interest and engagement in doing enjoyable/mastery activities    Current Stressors / Issues:  [facility] met with Miller today for a follow-up. Reports mood is stable and upbeat. States he has some mild concern about speaking during his father's upcoming  in October. Reports he is concerned with trying to keep his speech positive despite feeling abandoned by his father for the last seven years. Discussed this idea in detail, exploring past hurts in their relationship. Encouraged Miller to write a separate letter to his father, that includes his thoughts and feelings to help him process feelings more effectively. Miller notes he isn't going to Taoism, though is trying to practice his raquel at home, which was supported. Notes getting out of the house for appointments and meals with friends. Denies significant concerns to review today, so session consisted of reviewing concern about his father's speech and reinforcing adaptive steps he is taking/plans to take toward good mental health care.     Answers for HPI/ROS submitted by the patient on 2022  If you checked off any problems, how difficult have these problems made it for you to do your work, take care of things at home, or get along with other people?: Somewhat difficult  PHQ9 TOTAL SCORE: 4    Answers for HPI/ROS submitted by the patient on 2022  If you checked off any problems, how difficult have these problems made it for you to do your work, take care of things at home, or get along with other people?: Somewhat difficult  PHQ9 TOTAL SCORE: 3              Progress on Treatment Objective(s) / Homework:  Stable - MAINTENANCE (Working to maintain change, with risk of relapse);  Intervened by continuing to positively reinforce healthy behavior choice       Solution-Focused Therapy    Explore patterns in patient's relationships and discuss options for new behaviors.    Explore patterns in patient's actions and choices and discuss options for new behaviors.    Supportive Psychotherapy    Answers for HPI/ROS submitted by the patient on 3/21/2022  If you checked off any problems, how difficult have these problems made it for you to do your work, take care of things at home, or get along with other people?: Not difficult at all  PHQ9 TOTAL SCORE: 6      Answers for HPI/ROS submitted by the patient on 2/8/2022  If you checked off any problems, how difficult have these problems made it for you to do your work, take care of things at home, or get along with other people?: Somewhat difficult  PHQ9 TOTAL SCORE: 4      Answers for HPI/ROS submitted by the patient on 4/7/2021   LIZZETTE 7 TOTAL SCORE: 9  If you checked off any problems, how difficult have these problems made it for you to do your work, take care of things at home, or get along with other people?: Very difficult  PHQ9 TOTAL SCORE: 7*    *No SI    Answers for HPI/ROS submitted by the patient on 10/13/2020   If you checked off any problems, how difficult have these problems made it for you to do your work, take care of things at home, or get along with other people?: Somewhat difficult  PHQ9 TOTAL SCORE: 8*  LIZZETTE 7 TOTAL SCORE: 6      *No SI     Answers for HPI/ROS submitted by the patient on 12/29/2020   If you checked off any problems, how difficult have these problems made it for you to do your work, take care of things at home, or get along with other people?: Somewhat difficult  PHQ9 TOTAL SCORE: 5*  LIZZETTE 7 TOTAL SCORE: 8    *No SI       Care Plan review completed: yes    Medication Review:  No changes to current psychiatric medication(s)     Current Outpatient Medications   Medication     Acetaminophen (TYLENOL) 325 MG CAPS     ammonium  lactate (AMLACTIN) 12 % external cream     ARIPiprazole (ABILIFY) 5 MG tablet     aspirin (SM ASPIRIN ADULT LOW STRENGTH) 81 MG EC tablet     BD VIKTORIA U/F 32G X 4 MM insulin pen needle     ciclopirox (LOPROX) 0.77 % cream     Continuous Blood Gluc  (FREESTYLE CLAUDIA 14 DAY READER) CECILIO     Continuous Blood Gluc Sensor (FREESTYLE CLAUIDA 2 SENSOR) MISC     cyanocobalamin (VITAMIN B-12) 1000 MCG tablet     empagliflozin (JARDIANCE) 10 MG TABS tablet     insulin aspart (NOVOLOG PEN) 100 UNIT/ML pen     insulin degludec (TRESIBA FLEXTOUCH) 100 UNIT/ML pen     lamiVUDine (EPIVIR) 100 MG tablet     lisinopril (ZESTRIL) 5 MG tablet     methocarbamol (ROBAXIN) 750 MG tablet     metoprolol succinate ER (TOPROL-XL) 25 MG 24 hr tablet     Multiple Vitamin (TAB-A-GLADIS) TABS     mycophenolate (GENERIC EQUIVALENT) 250 MG capsule     order for DME     pantoprazole (PROTONIX) 40 MG EC tablet     polyethylene glycol (MIRALAX) 17 g packet     predniSONE (DELTASONE) 5 MG tablet     rosuvastatin (CRESTOR) 5 MG tablet     senna-docusate (SENOKOT-S/PERICOLACE) 8.6-50 MG tablet     tamsulosin (FLOMAX) 0.4 MG capsule     vitamin D3 (VITAMIN D3) 50 mcg (2000 units) tablet     Current Facility-Administered Medications   Medication     aflibercept (EYLEA) injection prefilled syringe 2 mg     aflibercept (EYLEA) injection prefilled syringe 2 mg         Medication Compliance:  Yes    Changes in Health Issues:   None reported    Chemical Use Review:   Substance Use: Chemical use reviewed, no active concerns identified      Tobacco Use: No current tobacco use.         Assessment: Current Emotional / Mental Status (status of significant symptoms):  Risk status (Self / Other harm or suicidal ideation)  Patient denies a history of suicidal ideation, suicide attempts, self-injurious behavior, homicidal ideation, homicidal behavior and and other safety concerns     Patient denies current fears or concerns for personal safety.  Patient denies  current or recent suicidal ideation or behaviors.  Patient denies current or recent homicidal ideation or behaviors.  Patient denies current or recent self injurious behavior or ideation.  Patient denies other safety concerns.     A safety and risk management plan has not been developed at this time, however patient was encouraged to call [place] [phone] should there be a change in any of these risk factors.    [place] Suicide Severity Rating Scale (Short Version)  [place] Suicide Severity Rating (Short Version) 11/10/2019 12/2/2019 12/10/2019 6/11/2020 7/1/2020 7/12/2021 1/10/2022   Over the past 2 weeks have you felt down, depressed, or hopeless? no yes yes no no no no   Over the past 2 weeks have you had thoughts of killing yourself? no yes no no no no no   Comments - lots of stressors, has thought about not taking meds - - - - -   Have you ever attempted to kill yourself? no no no no no no no   Q1 Wished to be Dead (Past Month) - other (see comments) no - - - -   Comments - why did i do this surgery - - - - -   Q2 Suicidal Thoughts (Past Month) - no no - - - -   Comments - wishes he wouldn't have gone through surgery - - - - -   Q3 Suicidal Thought Method - no no - - - -   Q4 Suicidal Intent without Specific Plan - no no - - - -   Q5 Suicide Intent with Specific Plan - no no - - - -   Q6 Suicide Behavior (Lifetime) - no no - - - -         Appearance:   Appropriate   Eye Contact:   Good   Psychomotor Behavior: Restless   Attitude:   Cooperative  Interested Friendly Pleasant  Orientation:   All  Speech   Rate / Production: Normal/ Responsive   Volume:  Normal   Mood:    Depressed ; improving  Affect:    Appropriate    Thought Content:  Clear   Thought Form:  Goal Directed  Logical   Insight:    Good     Diagnoses:  1. Bipolar 1 disorder, depressed, mild (H)    2. Generalized anxiety disorder          Collateral Reports Completed:  Not Applicable    Plan: (Homework, other):  Continue with this writer for  individual psychotherapy in 1 month. He will continue to work on managing depression and anxiety through achievable behavioral activation ideas.   No CD issues.     Joseph Murillo, RED  August 2, 2022              ______________________________________________________________________                                            Individual Treatment Plan    Patient's Name: Frandy Workman  YOB: 1965    Date of Creation: 3/1/2022  Date Treatment Plan Last Reviewed/Revised: 6/14/2022  DSM5 Diagnoses: 296.51 Bipolar I Disorder Current or Most Recent Episode Depressed, Mild or 300.02 (F41.1) Generalized Anxiety Disorder  Psychosocial / Contextual Factors: Post Solid Organ Tx  PROMIS (reviewed every 90 days): 4    Referral / Collaboration:  Referral to another professional/service is not indicated at this time..    Anticipated number of session for this episode of care: 10+  Anticipation frequency of session: Every other week  Anticipated Duration of each session: 38-52 minutes  Treatment plan will be reviewed in 90 days or when goals have been changed.       MeasurableTreatment Goal(s) related to diagnosis / functional impairment(s)  Goal 1: Patient will experience a reduction in depressive symptoms along with a corresponding increase in positive emotion and life satisfaction.      Objective #A (Patient Action)    Patient will Increase interest, engagement, and pleasure in doing things.  Status: Continued - Date(s): 6/14/2022    Intervention(s)  Therapist will help patient identify pleasant and mastery oriented events that elicit positive, relaxed mood.    Objective #B  Patient will Decrease frequency and intensity of feeling down, depressed, hopeless.  Status: Continued - Date(s): 6/14/2022    Intervention(s)  Therapist will introduce patient to cognitive-behavioral and acceptance and commitment therapy topics aimed to help reduce depression and anxiety    Objective #C  Patient will Identify negative  "self-talk and behaviors: challenge core beliefs, myths, and actions  Improve concentration, focus, and mindfulness in daily activities .  Status: Continued - Date(s): 6/14/2022    Intervention(s)  Therapist will help patient identify and manage negative self-talk and automatic thoughts; introduce patient to cognitive distortions; help patient develop cognitive diffusion techniques      Goal 2: Patient will experience a reduction in anxious symptoms, along with a corresponding increase in relaxed emotional states and life satisfaction.      Objective #A (Patient Action)  Patient will use cognitive-behavioral and thought diffusion strategies identified in therapy to challenge anxious thoughts.    Status: Continued - Date(s): 6/14/2022    Intervention(s)  Therapist will utilize CBT and ACT ideas to help patient challenge anxious thoughts and reduce intensity/duration of anxious distress    Objective #B  Patient will use \"worry time\" each day for 15 minutes of scheduled worry and then defer obsessive or anxious thinking until the next structured worry time.    Status: Continued - Date(s): 6/14/2022    Intervention(s)  Therapist will teach patient how to effectively utilize worry time and/or thought logs/journals each day and incorporate more relaxation behaviors into their routine.    Objective #C  Patient will identify the stressors which contribute to feelings of anxiety  Patient will increase engagement in adaptive coping skills and recreational activities, such as exercise and healthy socialization, to manage distress.  Status: Continued - Date(s): 6/14/2022    Intervention(s)  Therapist will help patient identify triggers/situational factors that contribute to anxiety and behavioral skills aimed to manage anxious distress.      Goal 3: Patiient will work toward adaptively managing bipolar-related depression and manic episodes      Objective #A (Patient Action)    Status: Continued - Date(s): 6/14/2022    Patient " will identify 2-3 signs or signals of emerging mood instability.    Intervention(s)  Therapist will provide educational materials on bipolar disorder and help identifying triggers for mood instability.    Objective #B  Patient will identify 2-3 strategies for more effectively managing Bipolar Disorder.    Status: Continued - Date(s): 6/14/2022    Intervention(s)  Therapist will teach emotional recognition/identification. Skills aimed to effectively manage bipolar disorder.      Other Possible Therapeutic Intervention(s):    Psycho-education regarding mental health diagnoses and treatment options    Supportive Therapy    Provide affirmations, reflections, and establish working rapport    Emphasize and reflect on strength of therapeutic relationship    Skills training    Explore skills useful to client in current situation.    Skills include assertiveness, communication, conflict management, problem-solving, relaxation, etc.    Solution-Focused Therapy    Explore patterns in patient's relationships and discuss options for new behaviors.    Explore patterns in patient's actions and choices and discuss options for new behaviors.    Cognitive-behavioral Therapy    Discuss common cognitive distortions, identify them in patient's life.    Explore ways to challenge, replace, and act against these cognitions.    Acceptance and Commitment Therapy    Explore and identify important values in patient's life.    Discuss ways to commit to behavioral activation around these values.    Psychodynamic psychotherapy    Discuss patient's emotional dynamics and issues and how they impact behaviors.    Explore patient's history of relationships and how they impact present behaviors.    Explore how to work with and make changes in these schemas and patterns.    Narrative Therapy    Explore the patient's story of his/her life from his/her perspective.    Explore alternate ways of understanding their experience, identifying exceptions,  developing new themes.    Interpersonal Psychotherapy    Explore patterns in relationships that are effective or ineffective at helping patient reach their goals, find satisfying experience.    Discuss new patterns or behaviors to engage in for improved social functioning.    Behavioral Activation    Discuss steps patient can take to become more involved in meaningful activity.    Identify barriers to these activities and explore possible solutions.    Mindfulness-Based Strategies    Discuss skills based on development and application of mindfulness.    Skills drawn from compassion-focused therapy, dialectical behavior therapy, mindfulness-based stress reduction, mindfulness-based cognitive therapy, etc.      Patient has reviewed and agreed to the above plan.      Joseph Murillo Psy.D, LP   Behavioral Health Clinician   -[facility]     6/14/2022

## 2022-09-11 ENCOUNTER — HEALTH MAINTENANCE LETTER (OUTPATIENT)
Age: 57
End: 2022-09-11

## 2022-09-12 ENCOUNTER — VIRTUAL VISIT (OUTPATIENT)
Dept: NEPHROLOGY | Facility: CLINIC | Age: 57
End: 2022-09-12
Attending: INTERNAL MEDICINE
Payer: MEDICARE

## 2022-09-12 VITALS — BODY MASS INDEX: 26.58 KG/M2 | WEIGHT: 180 LBS

## 2022-09-12 DIAGNOSIS — N18.32 STAGE 3B CHRONIC KIDNEY DISEASE (H): Primary | ICD-10-CM

## 2022-09-12 PROCEDURE — 99443 PR PHYSICIAN TELEPHONE EVALUATION 21-30 MIN: CPT | Mod: 95 | Performed by: INTERNAL MEDICINE

## 2022-09-12 ASSESSMENT — PAIN SCALES - GENERAL: PAINLEVEL: NO PAIN (0)

## 2022-09-12 NOTE — PROGRESS NOTES
Miller is a 57 year old who is being evaluated via a billable telephone visit.      What phone number would you like to be contacted at? [phone]     How would you like to obtain your AVS? Mail a copy  Phone call duration: 21 minutes    Eloy Rao MD

## 2022-09-12 NOTE — PROGRESS NOTES
Nephrology Clinic - Telephone Visit     Eloy Rao MD  2022     Name: Frandy Workman  MRN: [id number]  Age: 57 year old  : 1965  Referring provider: Natalie Russell     Assessment and Plan:  1. CKD, stage 3b (A3): Prior to recent liver transplant baseline Cr ~1.1 mg/dL with eGFR of 75 ml/min. Post-transplant creatinine has gradually fluctuated betwen  ~1.6-1.8 mg/dL (eGFR of 40 ml/min) and remains relatively stable though he has had much higher Cr in past hospitalizations that were more pre-renal in nature.  I suspect he likely has some degree of diabetic changes further complicated by chronic changes from past lithium use (for 15 years) and further complicated by tacrolimus toxicity.  He is at risk of progressive CKD due diabetes.  As mentioned, he is now off of tacrolimus and continues on MMF and prednisone for his liver transplant.  He did have worsening albuminuria that has significantly improved after restarting lisinopril ow dose lisinopril and empagliflozin at 10 mg daily (1085 mg/g to 30.3 mg/g to 340.6 mg/g).  -continue RAAS blockade with lisinopril at a low dose of 5 mg daily with goal of slowing progeession of CKD and minimizing albuminuria  -continue empagliflozin but at a lower dose of 10 mg daiy with the goal of minimizing albuminuria, slowing CKD progression, and minimizing cardiovascular events given his past CABG and ischemic HFrEF (45-50% by TTE In )  -will consider increasing lisinopril next if albuminuria worsens > 500 mg/g      -RTC in 6 months     2. HTN/Volume status: Euvolemic. Home BP at goal of goal today f at least <130/80.    -he will continue metoprolol XL at 12.5 mg and lisinopril 5 mg daily (RAAS blockade both for cardio and renal protection)  -continue empagliflozin which likely has led to better blood pressure control as well  -he will measure his BP daily and report to clinic for further adjustments.       3.  Electrolytes:   Potassium on higher side in past.  Suspect multifactorial in nature and due to CKD with underlying type 4 RTA physiology, hyperglycemia and past use of tacrolimus. It did improve after discontinuation of tacrolimus.  -refered to a dietician in past for more education regarding a low potassium diet.   No specific intervention needed at this time.         4. Anemia: Possibly related to Imuran in the past, which has been discontinued.  Iron stores are replete.  Anemia of chronic disease and renal insufficiency also likely contributing.  He did IVELISSE treatment and hemoglobin improved to 13.9 in the past.  Hemoglobin dropped again requiring restarting IVELISSE therapy (and a blood transfusion).  Fortunately, hemoglobin now stable in the 12's.        -he will continue to follow in the anemia management clinic     5.  Fatigue:  Suspect related to multiple comorbidities and deconditioned state.  We did check a TSH that was within normal limits.     -monitor for now    Follow-up: RTC in 6 months     Reason For Visit:   CKD    HPI:   Frandy Workman is a 57 year old man who presents for CKD f/u.  His past medical history is remarkable for liver transplant (November, 2019) for ESTRADA cirrhosis (complicated by ascites and hepatic encephalopathy on lactulose and rifaximin in the past), hepatocellular carcinoma (s/p TACE and microwave ablation 01/2019), long standing DM 2 (complicated by nonproliferative diabetic retinopathy, macular edema and peripheral neuropathy), bipolar disorder (previously on lithium for 15 years), HTN, hyperlipidemia and CAD by angiography not requiring PCI.      He denies excessive use of NSAIDs in the past.  He had no history of nephrolithiasis or frequent UTIs.  He has no significant family history of CKD.     In terms of CKD, he appeared to have a baseline of ~1.1 mg/dL prior to his liver transplant in November 2019.  Creatinine after transplant was ~1.3 mg/dL at time of discharge and vikram to 3.2 mg/dL thought  to be prerenal from volume depletion.  He was admitted for IVF and creatinine improved to 1.5 mg/dL at time of discharge.  His serum Cr now seems to fluctuate around 1.8 mg/dL with an eGFR of 40 ml/min.  He continues on tacrolimus for his liver transplant.  He no longer is on lasix for management of edema.  He has not required IVELISSE therapy in several months (last August, 2020).  He recently was started on low dose carvedilol for management of hypertension.  He also recently started empagliflozin for management of diabetes. He reports weight gain due to lack of exercise during the COVID pandemic.  His main complaint today is that of pain near his incisional scars for his liver transplant for which he is following up with hepatology.  His BP this morning was 129/84.      Interval History:  Overall, Mr. Workman feels well.  In July, 2021 he had unstble angina in the setting of severe anemia and eventually was noted to have a NSTEMI and underwent a CABG.  He was started on lisinopril and metoprolol but lisinopril was discontinued due to hypotension. His empagliflozin was discontinued during his recent hospitalization. Fortunately, we have been able to restart both lisinopril and empagliflozin.  He underwent cardiac rehab and has recovered well.  Anemia has been an intermittent issue requiring blood transfusions and IVELISSE therapy.  Fortunately, his hemoglobn has now been stable in the 12's.  His only complaint today continues to be that of fatigue.  He otherwise, has no major medical complaints.        Review of Systems:   Pertinent items are noted in HPI or as below, remainder of complete ROS is negative.      Active Medications:   Current Outpatient Medications   Medication     Acetaminophen (TYLENOL) 325 MG CAPS     ammonium lactate (AMLACTIN) 12 % external cream     aspirin (SM ASPIRIN ADULT LOW STRENGTH) 81 MG EC tablet     BD VIKTORIA U/F 32G X 4 MM insulin pen needle     ciclopirox (LOPROX) 0.77 % cream     Continuous  Blood Gluc  (FREESTYLE CLAUDIA 14 DAY READER) CECILIO     Continuous Blood Gluc Sensor (FREESTYLE CLAUDIA 2 SENSOR) MISC     empagliflozin (JARDIANCE) 10 MG TABS tablet     insulin aspart (NOVOLOG PEN) 100 UNIT/ML pen     insulin degludec (TRESIBA FLEXTOUCH) 100 UNIT/ML pen     lamiVUDine (EPIVIR) 100 MG tablet     methocarbamol (ROBAXIN) 750 MG tablet     metoprolol succinate ER (TOPROL-XL) 25 MG 24 hr tablet     Multiple Vitamin (TAB-A-GLADIS) TABS     mycophenolate (GENERIC EQUIVALENT) 250 MG capsule     order for DME     pantoprazole (PROTONIX) 40 MG EC tablet     polyethylene glycol (MIRALAX) 17 g packet     predniSONE (DELTASONE) 5 MG tablet     rosuvastatin (CRESTOR) 5 MG tablet     tamsulosin (FLOMAX) 0.4 MG capsule     ARIPiprazole (ABILIFY) 5 MG tablet     cyanocobalamin (VITAMIN B-12) 1000 MCG tablet     lisinopril (ZESTRIL) 5 MG tablet     senna-docusate (SENOKOT-S/PERICOLACE) 8.6-50 MG tablet     vitamin D3 (VITAMIN D3) 50 mcg (2000 units) tablet     Current Facility-Administered Medications   Medication     aflibercept (EYLEA) injection prefilled syringe 2 mg     aflibercept (EYLEA) injection prefilled syringe 2 mg        Allergies:   Codeine and Seasonal allergies      Past Medical History:  Chronic kidney disease, stage 3  Type 2 diabetes mellitus  Bipolar affective disorder   Brow ptosis  Hepatocellular carcinoma  Coronary artery disease s/p CABG  Hypertension   Anemia   Anxiety   Mild recurrent major depression  Mild nonproliferative retinopathy  Gastroesophageal reflux disease   Cholelithiasis   Benign prostatic hypertrophy   Portal vein thrombosis      Past Surgical History:  Liver transplant recipient   Coronary catheterization  Parotidectomy, radical neck dissection  Right eyelid weight placement  Orthopedic surgery    Family History:   Prostate cancer - maternal grandfather, father   Substance abuse - maternal grandfather, maternal grandmother   Colon cancer - father, paternal  grandmother  Cancer - mother  Thyroid disease - mother, sister   Stroke - mother  Depression - mother, sister  Asthma - sister      Social History:   Presents to clinic alone  Tobacco Use: Former smoker, 180 pack year history, quit 2017.   Alcohol Use: quit September 1996  PCP: Mohamud Tavarez      Physical Exam:  Wt 81.6 kg (180 lb)   BMI 26.58 kg/m     Physical exam deferred as encounter was a telephone visit.      Laboratory:  CMP  Recent Labs   Lab Test 09/07/22  0827 07/20/22  0953 07/13/22  1424 06/08/22  0834 06/08/22  0755 04/20/22  0912 04/06/22  0926 03/15/22  1022 02/03/22  0904 01/11/22  0905 01/10/22  2142 11/26/21  0747 10/04/21  0802 09/24/21  1000 08/23/21  0945 08/09/21  1022 07/21/21  1057 07/21/21  0617 07/12/21  1036 06/28/21  1347 06/14/21  1322 06/04/21  1236 05/05/21  0909    138 140  --  140 140 138   < > 140   < > 135   < > 141 139   < > 136   < > 136   < > 137 139 138 139   POTASSIUM 4.7 4.7 6.2*  --  4.6 5.4* 5.4*   < > 4.4   < > 5.8*   < > 4.2 4.6   < > 4.4   < > 4.1   < > 4.6 4.7 4.6 4.5   CHLORIDE 103 105 108  --  106 107 106   < > 105   < > 106   < > 110* 105   < > 105   < > 104   < > 107 106 106 107   CO2 27 26 25  --  27 27 25   < > 27   < > 19*   < > 28 27   < > 24   < > 23   < > 25 26 26 26   ANIONGAP 10 7 7  --  7 6 7   < > 8   < > 10   < > 3 7   < > 7   < > 9   < > 5 7 6 6   * 269* 202*  --  105* 130* 165*   < > 165*   < > 195*   < > 132* 140*   < > 283*   < > 170*   < > 208* 173* 156* 258*   BUN 32.9* 32* 37*  --  35* 45* 50*   < > 29   < > 78*   < > 23 33*   < > 24   < > 18   < > 33* 29 39* 38*   CR 1.47* 1.85* 1.83* 1.7* 1.77* 1.88* 1.81*   < > 1.54*   < > 2.21*   < > 1.26* 1.30*   < > 1.50*   < > 1.03   < > 1.62* 1.54* 1.64* 1.52*   GFRESTIMATED 55* 42* 42* 46* 44* 41* 43*   < > 52*   < > 34*   < > 63 61   < > 51*   < > 80   < > 46* 49* 46* 50*   GFRESTBLACK  --   --   --   --   --   --   --   --   --   --   --   --   --   --   --   --   --   --   --  54*  57* 53* 58*   DEBORAH 10.1* 9.4 9.9  --  9.0 9.2 9.3   < > 9.0   < > 10.4*   < > 8.7 9.2   < > 9.4   < > 7.1*   < > 9.1 9.8 10.2* 9.6   MAG  --   --   --   --   --   --   --   --   --   --  2.3  --   --  2.0  --  1.8  --  2.7*   < >  --   --  2.2  --    PHOS 3.4 3.4  --   --   --  4.2  --   --   --   --   --   --  3.2  --   --   --   --  3.1   < >  --   --   --   --    PROTTOTAL  --   --  7.8  --  7.2  --  7.5  --  6.8   < > 8.6   < >  --  7.3   < > 7.2   < > 6.2*   < > 7.4 7.8 7.8 7.8   ALBUMIN 4.5 4.3 4.3  --  4.1 4.2 4.4  --  3.8   < > 3.6   < > 4.0 4.0   < > 3.7   < > 2.7*   < > 4.0 4.1 4.2 4.2   BILITOTAL  --   --  0.6  --  0.4  --  0.3  --  0.4   < > 0.4   < >  --  0.6   < > 0.4   < > 0.5   < > 0.5 0.6 0.5 0.5   ALKPHOS  --   --  104  --  85  --  91  --  99   < > 106   < >  --  78   < > 118   < > 126   < > 98 106 108 112   AST  --   --  10  --  12  --  13  --  15   < > 25   < >  --  6   < > 5   < > 12   < > 9 8 10 13   ALT  --   --  21  --  26  --  28  --  20   < > 19   < >  --  17   < > 15   < > 17   < > 20 21 26 28    < > = values in this interval not displayed.     CBC  Recent Labs   Lab Test 09/07/22  0827 07/13/22  1424 06/08/22  0755 06/01/22  0937 04/25/22  0925 04/20/22  0912   HGB 12.8* 13.0* 11.8* 11.9*   < > 11.7*   WBC 4.7 5.7 5.2  --   --  4.6   RBC 4.08* 4.13* 3.75*  --   --  3.67*   HCT 40.3 40.4 37.7* 37.5*   < > 37.6*   MCV 99 98 101*  --   --  103*   MCH 31.4 31.5 31.5  --   --  31.9   MCHC 31.8 32.2 31.3*  --   --  31.1*   RDW 14.6 13.8 13.4  --   --  14.1   * 153 139*  --   --  161    < > = values in this interval not displayed.     INR  Recent Labs   Lab Test 01/11/22  2200 07/14/21  1351 07/14/21  1215 07/12/21  1608 11/12/19  1645 11/12/19  1400 11/12/19  0950 11/12/19  0346   INR 1.13 1.28* 1.45* 0.96   < > 1.46*   < > 1.47*   PTT  --  46* 37  --   --  39*  --  57*    < > = values in this interval not displayed.     ABG  Recent Labs   Lab Test 01/10/22  2314 07/15/21  0425  07/14/21  2049 07/14/21  1605 07/14/21  1352 07/14/21  1351 07/14/21  1351 07/14/21  1252 07/14/21  1216   PH 7.37  --   --  7.37  --   --  7.29* 7.36 7.38   PCO2  --   --   --  37  --   --  44 35 36   PO2  --   --   --  87  --   --  145* 181* 306*   HCO3  --   --   --  21  --   --  21 20* 21   O2PER  --  21 21 21 5   < > 5 50.0 75.0    < > = values in this interval not displayed.      URINE STUDIES  Recent Labs   Lab Test 01/11/22  0001 09/07/21  1115 12/04/19  1400 11/15/19  1123   COLOR Light Yellow Yellow Light Yellow Light Yellow   APPEARANCE Clear Cloudy* Clear Clear   URINEGLC >=1000* 50 * 30* 300*   URINEBILI Negative Negative Negative Negative   URINEKETONE Negative Negative Negative Negative   SG 1.018 1.022 1.009 1.007   UBLD Negative Small* Negative Small*   URINEPH 5.0 5.0 5.0 6.5   PROTEIN Negative >499* 30* Negative   NITRITE Negative Negative Negative Negative   LEUKEST Negative Negative Negative Negative   RBCU <1 1 0 0   WBCU 1 1 1 <1     Recent Labs   Lab Test 04/20/22  0919 10/04/21  0804 09/07/21  1115 02/26/21  0750 06/29/20  1008 03/02/20  1013 12/02/19  1040 07/08/19  1017 02/04/19  0705   UTPG 0.12 1.46* 1.17* 0.47* 0.89* 0.29* 1.22* 0.11 0.14     PTH  Recent Labs   Lab Test 04/20/22  0912 10/04/21  0802 06/29/20  1005 07/08/19  1020   PTHI 59 81* 61 54     IRON STUDIES   Recent Labs   Lab Test 04/25/22  0925 02/09/22  0850 11/16/21  1004 11/02/21  1031 07/12/21  1608 06/28/21  1347 06/04/21  1236 12/02/20  0739 11/11/20  0754 10/14/20  0836 09/16/20  0802 07/29/20  0749 06/29/20  1005 05/21/20  0723 04/22/20  0833 03/09/20  0823 01/27/20  1340 12/02/19  1034 12/28/18  1108 09/15/18  1215 06/09/17  1250   IRON 119 96 93 109  --  112 158 75 81 73 93 87 60 72 65 92  --  88  --  52  --     248 211* 241  --  226* 270 228* 227* 248 221* 230* 204* 192* 215* 231*  --  248  --  403  --    IRONSAT 42 39 44 45  --  50* 59* 33 36 29 42 38 29 38 30 40  --  36  --  13*  --    QUAN 508* 1,046* 400*  479* 543* 495* 399* 433* 286 323 223 193 352 344 643* 859* 690* 1,353* 90 10* 450*       All above labs reviewed by me.   Eloy Rao MD   [phone]

## 2022-09-21 DIAGNOSIS — E11.3313 TYPE 2 DIABETES MELLITUS WITH MODERATE NONPROLIFERATIVE RETINOPATHY OF BOTH EYES AND MACULAR EDEMA, UNSPECIFIED WHETHER LONG TERM INSULIN USE (H): Primary | ICD-10-CM

## 2022-09-28 ENCOUNTER — OFFICE VISIT (OUTPATIENT)
Dept: OPHTHALMOLOGY | Facility: CLINIC | Age: 57
End: 2022-09-28
Attending: OPHTHALMOLOGY
Payer: MEDICARE

## 2022-09-28 DIAGNOSIS — H25.013 CORTICAL SENILE CATARACT OF BOTH EYES: Primary | ICD-10-CM

## 2022-09-28 DIAGNOSIS — E11.3313 TYPE 2 DIABETES MELLITUS WITH MODERATE NONPROLIFERATIVE RETINOPATHY OF BOTH EYES AND MACULAR EDEMA, UNSPECIFIED WHETHER LONG TERM INSULIN USE (H): ICD-10-CM

## 2022-09-28 PROCEDURE — 92134 CPTRZ OPH DX IMG PST SGM RTA: CPT | Performed by: OPHTHALMOLOGY

## 2022-09-28 PROCEDURE — 92235 FLUORESCEIN ANGRPH MLTIFRAME: CPT | Performed by: OPHTHALMOLOGY

## 2022-09-28 PROCEDURE — 99214 OFFICE O/P EST MOD 30 MIN: CPT | Mod: 25 | Performed by: OPHTHALMOLOGY

## 2022-09-28 PROCEDURE — G0463 HOSPITAL OUTPT CLINIC VISIT: HCPCS | Mod: 25

## 2022-09-28 PROCEDURE — 67228 TREATMENT X10SV RETINOPATHY: CPT | Mod: LT | Performed by: OPHTHALMOLOGY

## 2022-09-28 PROCEDURE — 76519 ECHO EXAM OF EYE: CPT | Performed by: OPHTHALMOLOGY

## 2022-09-28 PROCEDURE — 92015 DETERMINE REFRACTIVE STATE: CPT | Mod: GY

## 2022-09-28 ASSESSMENT — VISUAL ACUITY
CORRECTION_TYPE: GLASSES
OS_BAT_LOW: 20/40
OD_CC: 20/40
OS_CC: 20/40
METHOD: SNELLEN - LINEAR
OD_BAT_MED: 20/30
OS_BAT_HIGH: 20/60+1
OD_BAT_HIGH: 20/40
OD_BAT_LOW: 20/25
OS_BAT_MED: 20/50

## 2022-09-28 ASSESSMENT — CONF VISUAL FIELD
OD_NORMAL: 1
OS_NORMAL: 1
METHOD: COUNTING FINGERS

## 2022-09-28 ASSESSMENT — REFRACTION_WEARINGRX
OS_ADD: +2.00
OD_SPHERE: -7.00
OS_CYLINDER: +0.50
OS_SPHERE: -6.75
OS_AXIS: 044
SPECS_TYPE: PAL
OD_ADD: +2.00
OD_CYLINDER: +0.75
OD_AXIS: 131

## 2022-09-28 ASSESSMENT — TONOMETRY
OD_IOP_MMHG: 15
OS_IOP_MMHG: 13
IOP_METHOD: TONOPEN

## 2022-09-28 ASSESSMENT — REFRACTION_MANIFEST
OD_SPHERE: -7.00
OD_ADD: +2.50
OS_CYLINDER: +1.00
OD_CYLINDER: +1.00
OS_ADD: +2.50
OS_SPHERE: -7.25
OS_AXIS: 050
OD_AXIS: 130

## 2022-09-28 ASSESSMENT — SLIT LAMP EXAM - LIDS
COMMENTS: SMALL PAPILLOMA ALONG LL MARGIN
COMMENTS: NORMAL

## 2022-09-28 ASSESSMENT — CUP TO DISC RATIO
OS_RATIO: 0.3
OD_RATIO: 0.25

## 2022-09-28 ASSESSMENT — EXTERNAL EXAM - LEFT EYE: OS_EXAM: NORMAL

## 2022-09-28 ASSESSMENT — EXTERNAL EXAM - RIGHT EYE: OD_EXAM: NORMAL

## 2022-09-28 NOTE — PROGRESS NOTES
CC:  Follow up Diabetic macular edema and retinopathy     HPI: Frandy Workman is a  57 year old year-old patient with history of Diabetes mellitus for 24 ys . Patient on insulin.     Interval History: Vision is stable. occasional blurry vision; no new flashes and floaters   Lab Results   Component Value Date    A1C 6.0 01/10/2022    A1C 6.8 07/13/2021    A1C 6.0 12/30/2020    A1C 6.3 06/13/2020    A1C 6.6 08/08/2018    A1C 6.5 06/09/2017    A1C 7.8 10/25/2016     Retinal Imaging:  OCT 09/28/22    RE: mild Diabetic macular edema. Vitreous attached. Small intraretinal fluid inf macula  LE: mild Diabetic macular edema, mild Epiretinal membrane; trace cystic changes     fluorescein angiography transits right eye 3/17/2022 -  Swallowed dye, unable to place IV  Right eye: MA, no macular leakage,  No NV, peripheral leakage and capillary non perfusion   Left eye: MA, no macular leakage,  No NV, peripheral leakage and capillary non perfusion      Optos consistent with clinical exam    Assessment & Plan:  # Diabetic macular edema both eyes    - status post avastin x 4. Switch to Eylea 4/24/19 with improvement of Diabetic macular edema    - now with resolved Diabetic macular edema both eyes    - Last Eylea injection was 5/2021 both eyes    - might consider inj in the future    # new vitreous hemorrhage left eye   Likely from Proliferative diabetic retinopathy left eye   severe nonproliferative diabetic retinopathy right eye     - HbA1c 6.3% (6/2020)   - fluorescein angiography 09/28/22  limited by oral dye. No obvious NVE, but peripheral leakage and capillary non perfusion; neovascularization elsewhere left eye probably not seen because of  Vitreous hemorrhage and oral fluorescein   - Blood pressure (<120/80) and blood glucose (HbA1c <7.0, ~6.5 today) control discussed with patient. Patient advised that failure to adequately control each may lead to vision loss. The patient expressed understanding.    Recommend Panretinal  laser photocoagulation (PRP) left eye  HOB elevated  Will consider Panretinal laser photocoagulation (PRP) right eye and/or eylea inj next follow up      #. Senile nuclear sclerosis, bilateral   visually significant both eyes  Patient on flomax   Will hold on Cataract extraction intraocular lens till proliferative diabetic retinopathy is under good control  Glare Testing (BAT)     Off Low Medium High   Right 20/25 20/25 20/30 20/40   Left 20/25 20/40 20/50 20/60+1   Edited by: Elin Rodas CO     Visually significant:YES  Glare: Positive   Reports impacts ADL   Dilation: 5 mm  Iris expansion: yes; likely  Pseudoexfoliation: NO  Trypan Blue: yes  Phacodonesis: No  Cornea guttae: rare  Aim for:  Start artificial tears twice a day and as needed    Anesthesia: peribulbar block  Surgical time: 30 min  Candidate for toric IOL:   Anticoagulants: NO  Alpha blockers/Flomax: YES    I reviewed the indications, risks, benefits, and alternatives of the proposed surgical procedure. We discussed at length cataracts and the effect of the cataracts on vision.   We discussed the fact that modern cataract surgery is usually successful at alleviating symptoms of glare when the cataract is the causative factor. Other risks were discussed with patient including, but not limited to, failure to improve vision or further loss of vision,  and need for additional surgery, bleeding, infection, loss of vision and the remote possibility of complications of anesthesia. 1:1000 risk of infection/bleed/loss of eye; 1:100 risk of RD and need for further surgery. Patient agreed to proceed with surgery.  I provided multiple opportunities for the questions, answered all questions to the best of my ability, and confirmed that my answers and my discussion were understood.       # Dry eye syndrome   Left eye worse than right eye   warm compresses and artificial tears  As needed  -punctal plugs previously left eye - reports this is better     # Status  post liver transplant   - tx 11/2019   - severe anemia; s/p transfusion - anemia much improved    - being seen by his primary team    Plan: 2-3 weeks Will consider Panretinal laser photocoagulation (PRP) right eye  and/or eylea inj both eyes next follow up   Layton Shanks MD  Vitreoretinal Fellow, PGY6    ~~~~~~~~~~~~~~~~~~~~~~~~~~~~~~~~~~   Complete documentation of historical and exam elements from today's encounter can be found in the full encounter summary report (not reduplicated in this progress note).  I personally obtained the chief complaint(s) and history of present illness.  I confirmed and edited as necessary the review of systems, past medical/surgical history, family history, social history, and examination findings as documented by others; and I examined the patient myself.  I personally reviewed the relevant tests, images, and reports as documented above.  I personally reviewed the ophthalmic test(s) associated with this encounter, agree with the interpretation(s) as documented by the resident/fellow, and have edited the corresponding report(s) as necessary.   I formulated and edited as necessary the assessment and plan and discussed the findings and management plan with the patient and family and I was present for critical portions of the procedure carried out by the resident/fellow and immediately available for the entire procedure    Enriqueta Martin MD   of Ophthalmology.  Retina Service   Department of Ophthalmology and Visual Neurosciences   [facility]  Phone: [phone]   Fax: [phone]

## 2022-09-29 ENCOUNTER — VIRTUAL VISIT (OUTPATIENT)
Dept: BEHAVIORAL HEALTH | Facility: CLINIC | Age: 57
End: 2022-09-29
Payer: MEDICARE

## 2022-09-29 DIAGNOSIS — F31.31 BIPOLAR 1 DISORDER, DEPRESSED, MILD (H): Primary | ICD-10-CM

## 2022-09-29 PROCEDURE — 90832 PSYTX W PT 30 MINUTES: CPT | Mod: 95 | Performed by: PSYCHOLOGIST

## 2022-09-29 NOTE — PROGRESS NOTES
[facility]: Integrated Behavioral Health  September 29, 2022      Behavioral Health Clinician Progress Note    Patient Name: Frandy Workman           Service Type: Video visit      Service Location:  Video visit      Session Start Time: 3:01  Session End Time:  3:30      Session Length: 16 - 37      Attendees: Patient    Visit Activities (Refresh list every visit): [facility] Only     Service Modality:  Video Visit:      Provider verified identity through the following two step process.  Patient provided:  Patient photo and Patient is known previously to provider    Telemedicine Visit: The patient's condition can be safely assessed and treated via synchronous audio and visual telemedicine encounter.      Reason for Telemedicine Visit: Patient has requested telehealth visit and Services only offered telehealth    Originating Site (Patient Location): Patient's home    Distant Site (Provider Location): Provider Remote Setting- Home Office    Consent:  The patient/guardian has verbally consented to: the potential risks and benefits of telemedicine (video visit) versus in person care; bill my insurance or make self-payment for services provided; and responsibility for payment of non-covered services.     Patient would like the video invitation sent by:  Send to e-mail at: [email or web address]    Mode of Communication:  Video Conference via [facility]    As the provider I attest to compliance with applicable laws and regulations related to telemedicine.      Diagnostic Assessment Date:  12/03/2020  Treatment Plan Review Date:  12/29/2022  See Flowsheets for today's PHQ-9 and LIZZETTE-7 results  Previous PHQ-9:   PHQ-9 SCORE 5/23/2022 7/12/2022 9/8/2022   PHQ-9 Total Score MyChart 2 (Minimal depression) 4 (Minimal depression) 3 (Minimal depression)   PHQ-9 Total Score 2 4 3     Previous LIZZETTE-7:   LIZZETTE-7 SCORE 12/29/2020 4/7/2021 9/30/2021   Total Score 8 (mild anxiety) 9 (mild anxiety) -   Total  Score 8 9 10       Extended Session (60+ minutes): No  Interactive Complexity: No  Crisis: No    Treatment Objective(s) Addressed in This Session:  Target Behavior(s): disease management/lifestyle changes  related to adaptive approaches to managing anxious distress and mood difficulties    Relationship concerns/Stress management    Current Stressors / Issues:  [facility] [place] met with Miller today for a follow-up. Reports anxiety about his father's upcoming  this Saturday. Notes his daughter and sister will be in attendance and he feels worried about having possible interactions with them. Normalized his experiences with stress about this and we explored coping skills he could utilize on Saturday, including focusing on his father, walking away if a conflict occurs, and redirecting uncomfortable conversations toward focusing on his father.     Answers for HPI/ROS submitted by the patient on 2022  If you checked off any problems, how difficult have these problems made it for you to do your work, take care of things at home, or get along with other people?: Somewhat difficult  PHQ9 TOTAL SCORE: 4    Answers for HPI/ROS submitted by the patient on 2022  If you checked off any problems, how difficult have these problems made it for you to do your work, take care of things at home, or get along with other people?: Somewhat difficult  PHQ9 TOTAL SCORE: 3              Progress on Treatment Objective(s) / Homework:  Stable - MAINTENANCE (Working to maintain change, with risk of relapse); Intervened by continuing to positively reinforce healthy behavior choice       Solution-Focused Therapy    Explore patterns in patient's relationships and discuss options for new behaviors.    Explore patterns in patient's actions and choices and discuss options for new behaviors.    Supportive Psychotherapy    Answers for HPI/ROS submitted by the patient on 3/21/2022  If you checked off any problems, how difficult have these problems made  it for you to do your work, take care of things at home, or get along with other people?: Not difficult at all  PHQ9 TOTAL SCORE: 6      Answers for HPI/ROS submitted by the patient on 2/8/2022  If you checked off any problems, how difficult have these problems made it for you to do your work, take care of things at home, or get along with other people?: Somewhat difficult  PHQ9 TOTAL SCORE: 4      Answers for HPI/ROS submitted by the patient on 4/7/2021   LIZZETTE 7 TOTAL SCORE: 9  If you checked off any problems, how difficult have these problems made it for you to do your work, take care of things at home, or get along with other people?: Very difficult  PHQ9 TOTAL SCORE: 7*    *No SI    Answers for HPI/ROS submitted by the patient on 10/13/2020   If you checked off any problems, how difficult have these problems made it for you to do your work, take care of things at home, or get along with other people?: Somewhat difficult  PHQ9 TOTAL SCORE: 8*  LIZZETTE 7 TOTAL SCORE: 6      *No SI     Answers for HPI/ROS submitted by the patient on 12/29/2020   If you checked off any problems, how difficult have these problems made it for you to do your work, take care of things at home, or get along with other people?: Somewhat difficult  PHQ9 TOTAL SCORE: 5*  LIZZETTE 7 TOTAL SCORE: 8    *No SI       Care Plan review completed: yes    Medication Review:  No changes to current psychiatric medication(s)     Current Outpatient Medications   Medication     Acetaminophen (TYLENOL) 325 MG CAPS     ammonium lactate (AMLACTIN) 12 % external cream     ARIPiprazole (ABILIFY) 5 MG tablet     aspirin (SM ASPIRIN ADULT LOW STRENGTH) 81 MG EC tablet     BD VIKTORIA U/F 32G X 4 MM insulin pen needle     ciclopirox (LOPROX) 0.77 % cream     Continuous Blood Gluc  (FREESTYLE CLAUDIA 14 DAY READER) CECILIO     Continuous Blood Gluc Sensor (FREESTYLE CLAUDIA 2 SENSOR) MISC     cyanocobalamin (VITAMIN B-12) 1000 MCG tablet     empagliflozin (JARDIANCE) 10 MG  TABS tablet     insulin aspart (NOVOLOG PEN) 100 UNIT/ML pen     insulin degludec (TRESIBA FLEXTOUCH) 100 UNIT/ML pen     lamiVUDine (EPIVIR) 100 MG tablet     lisinopril (ZESTRIL) 5 MG tablet     methocarbamol (ROBAXIN) 750 MG tablet     metoprolol succinate ER (TOPROL-XL) 25 MG 24 hr tablet     Multiple Vitamin (TAB-A-GLADIS) TABS     mycophenolate (GENERIC EQUIVALENT) 250 MG capsule     order for DME     pantoprazole (PROTONIX) 40 MG EC tablet     polyethylene glycol (MIRALAX) 17 g packet     predniSONE (DELTASONE) 5 MG tablet     rosuvastatin (CRESTOR) 5 MG tablet     senna-docusate (SENOKOT-S/PERICOLACE) 8.6-50 MG tablet     tamsulosin (FLOMAX) 0.4 MG capsule     vitamin D3 (VITAMIN D3) 50 mcg (2000 units) tablet     Current Facility-Administered Medications   Medication     aflibercept (EYLEA) injection prefilled syringe 2 mg     aflibercept (EYLEA) injection prefilled syringe 2 mg         Medication Compliance:  Yes    Changes in Health Issues:   None reported    Chemical Use Review:   Substance Use: Chemical use reviewed, no active concerns identified      Tobacco Use: No current tobacco use.         Assessment: Current Emotional / Mental Status (status of significant symptoms):  Risk status (Self / Other harm or suicidal ideation)  Patient denies a history of suicidal ideation, suicide attempts, self-injurious behavior, homicidal ideation, homicidal behavior and and other safety concerns     Patient denies current fears or concerns for personal safety.  Patient denies current or recent suicidal ideation or behaviors.  Patient denies current or recent homicidal ideation or behaviors.  Patient denies current or recent self injurious behavior or ideation.  Patient denies other safety concerns.     A safety and risk management plan has not been developed at this time, however patient was encouraged to call [facility] should there be a change in any of these risk factors.    [place] Suicide Severity  Rating Scale (Short Version)  [place] Suicide Severity Rating (Short Version) 11/10/2019 12/2/2019 12/10/2019 6/11/2020 7/1/2020 7/12/2021 1/10/2022   Over the past 2 weeks have you felt down, depressed, or hopeless? no yes yes no no no no   Over the past 2 weeks have you had thoughts of killing yourself? no yes no no no no no   Comments - lots of stressors, has thought about not taking meds - - - - -   Have you ever attempted to kill yourself? no no no no no no no   Q1 Wished to be Dead (Past Month) - other (see comments) no - - - -   Comments - why did i do this surgery - - - - -   Q2 Suicidal Thoughts (Past Month) - no no - - - -   Comments - wishes he wouldn't have gone through surgery - - - - -   Q3 Suicidal Thought Method - no no - - - -   Q4 Suicidal Intent without Specific Plan - no no - - - -   Q5 Suicide Intent with Specific Plan - no no - - - -   Q6 Suicide Behavior (Lifetime) - no no - - - -         Appearance:   Appropriate   Eye Contact:   Good   Psychomotor Behavior: Restless   Attitude:   Cooperative  Interested Friendly Pleasant  Orientation:   All  Speech   Rate / Production: Normal/ Responsive   Volume:  Normal   Mood:    Depressed ; improving  Affect:    Appropriate    Thought Content:  Clear   Thought Form:  Goal Directed  Logical   Insight:    Good     Diagnoses:  1. Bipolar 1 disorder, depressed, mild (H)          Collateral Reports Completed:  Not Applicable    Plan: (Homework, other):  Continue with this writer for individual psychotherapy in 1 month. He will continue to work on managing depression and anxiety through achievable behavioral activation ideas. Provided adaptive skills to manage stress and relationship conflict today.  No CD issues.     Joseph Murillo, RED  September 29, 2022              ______________________________________________________________________                                            Individual Treatment Plan    Patient's Name: Frandy Workman  Date Of  Birth: 1965    Date of Creation: 3/1/2022  Date Treatment Plan Last Reviewed/Revised: September 29, 2022    DSM5 Diagnoses: 296.51 Bipolar I Disorder Current or Most Recent Episode Depressed, Mild or 300.02 (F41.1) Generalized Anxiety Disorder  Psychosocial / Contextual Factors: Post Solid Organ Tx  PROMIS (reviewed every 90 days): 4    Referral / Collaboration:  Referral to another professional/service is not indicated at this time..    Anticipated number of session for this episode of care: 4-6  Anticipation frequency of session: Every other week  Anticipated Duration of each session: 38-52 minutes  Treatment plan will be reviewed in 90 days or when goals have been changed.       MeasurableTreatment Goal(s) related to diagnosis / functional impairment(s)  Goal 1: Patient will experience a reduction in depressive symptoms along with a corresponding increase in positive emotion and life satisfaction.      Objective #A (Patient Action)    Patient will Increase interest, engagement, and pleasure in doing things.  Status: Continued - Date(s): 9/29/2022    Intervention(s)  Therapist will help patient identify pleasant and mastery oriented events that elicit positive, relaxed mood.    Objective #B  Patient will Decrease frequency and intensity of feeling down, depressed, hopeless.  Status: Continued - Date(s): 9/29/2022    Intervention(s)  Therapist will introduce patient to cognitive-behavioral and acceptance and commitment therapy topics aimed to help reduce depression and anxiety    Objective #C  Patient will Identify negative self-talk and behaviors: challenge core beliefs, myths, and actions  Improve concentration, focus, and mindfulness in daily activities .  Status: Continued - Date(s): 9/29/2022    Intervention(s)  Therapist will help patient identify and manage negative self-talk and automatic thoughts; introduce patient to cognitive distortions; help patient develop cognitive diffusion techniques      Goal  "2: Patient will experience a reduction in anxious symptoms, along with a corresponding increase in relaxed emotional states and life satisfaction.      Objective #A (Patient Action)  Patient will use cognitive-behavioral and thought diffusion strategies identified in therapy to challenge anxious thoughts.    Status: Continued - Date(s): 9/29/2022    Intervention(s)  Therapist will utilize CBT and ACT ideas to help patient challenge anxious thoughts and reduce intensity/duration of anxious distress    Objective #B  Patient will use \"worry time\" each day for 15 minutes of scheduled worry and then defer obsessive or anxious thinking until the next structured worry time.    Status: Continued - Date(s): 9/29/2022    Intervention(s)  Therapist will teach patient how to effectively utilize worry time and/or thought logs/journals each day and incorporate more relaxation behaviors into their routine.    Objective #C  Patient will identify the stressors which contribute to feelings of anxiety  Patient will increase engagement in adaptive coping skills and recreational activities, such as exercise and healthy socialization, to manage distress.  Status: Continued - Date(s): 9/29/2022    Intervention(s)  Therapist will help patient identify triggers/situational factors that contribute to anxiety and behavioral skills aimed to manage anxious distress.      Goal 3: Patiient will work toward adaptively managing bipolar-related depression and manic episodes      Objective #A (Patient Action)    Status: Continued - Date(s): COMPLETE    Patient will identify 2-3 signs or signals of emerging mood instability.    Intervention(s)  Therapist will provide educational materials on bipolar disorder and help identifying triggers for mood instability.    Objective #B  Patient will identify 2-3 strategies for more effectively managing Bipolar Disorder.    Status: Continued - Date(s): COMPLETE    Intervention(s)  Therapist will teach emotional " recognition/identification. Skills aimed to effectively manage bipolar disorder.      Other Possible Therapeutic Intervention(s):    Psycho-education regarding mental health diagnoses and treatment options    Supportive Therapy    Provide affirmations, reflections, and establish working rapport    Emphasize and reflect on strength of therapeutic relationship    Skills training    Explore skills useful to client in current situation.    Skills include assertiveness, communication, conflict management, problem-solving, relaxation, etc.    Solution-Focused Therapy    Explore patterns in patient's relationships and discuss options for new behaviors.    Explore patterns in patient's actions and choices and discuss options for new behaviors.    Cognitive-behavioral Therapy    Discuss common cognitive distortions, identify them in patient's life.    Explore ways to challenge, replace, and act against these cognitions.    Acceptance and Commitment Therapy    Explore and identify important values in patient's life.    Discuss ways to commit to behavioral activation around these values.    Psychodynamic psychotherapy    Discuss patient's emotional dynamics and issues and how they impact behaviors.    Explore patient's history of relationships and how they impact present behaviors.    Explore how to work with and make changes in these schemas and patterns.    Narrative Therapy    Explore the patient's story of his/her life from his/her perspective.    Explore alternate ways of understanding their experience, identifying exceptions, developing new themes.    Interpersonal Psychotherapy    Explore patterns in relationships that are effective or ineffective at helping patient reach their goals, find satisfying experience.    Discuss new patterns or behaviors to engage in for improved social functioning.    Behavioral Activation    Discuss steps patient can take to become more involved in meaningful activity.    Identify barriers  to these activities and explore possible solutions.    Mindfulness-Based Strategies    Discuss skills based on development and application of mindfulness.    Skills drawn from compassion-focused therapy, dialectical behavior therapy, mindfulness-based stress reduction, mindfulness-based cognitive therapy, etc.      Patient has reviewed and agreed to the above plan.      Mateo [email or web address], LP   Behavioral Health Clinician   -[facility]     9/29/2022

## 2022-10-04 DIAGNOSIS — E11.3313 TYPE 2 DIABETES MELLITUS WITH MODERATE NONPROLIFERATIVE RETINOPATHY OF BOTH EYES AND MACULAR EDEMA, UNSPECIFIED WHETHER LONG TERM INSULIN USE (H): Primary | ICD-10-CM

## 2022-10-05 DIAGNOSIS — N18.32 STAGE 3B CHRONIC KIDNEY DISEASE (H): ICD-10-CM

## 2022-10-05 DIAGNOSIS — R80.1 PERSISTENT PROTEINURIA: ICD-10-CM

## 2022-10-05 DIAGNOSIS — E11.3293 TYPE 2 DIABETES MELLITUS WITH MILD NONPROLIFERATIVE RETINOPATHY OF BOTH EYES WITHOUT MACULAR EDEMA, UNSPECIFIED WHETHER LONG TERM INSULIN USE (H): ICD-10-CM

## 2022-10-05 DIAGNOSIS — I50.20 HFREF (HEART FAILURE WITH REDUCED EJECTION FRACTION) (H): ICD-10-CM

## 2022-10-07 RX ORDER — EMPAGLIFLOZIN 10 MG/1
TABLET, FILM COATED ORAL
Qty: 30 TABLET | Refills: 11 | Status: SHIPPED | OUTPATIENT
Start: 2022-10-07 | End: 2022-10-31

## 2022-10-12 ENCOUNTER — OFFICE VISIT (OUTPATIENT)
Dept: OPHTHALMOLOGY | Facility: CLINIC | Age: 57
End: 2022-10-12
Attending: OPHTHALMOLOGY
Payer: MEDICARE

## 2022-10-12 DIAGNOSIS — H43.12 VITREOUS HEMORRHAGE OF LEFT EYE (H): Primary | ICD-10-CM

## 2022-10-12 DIAGNOSIS — E11.3313 TYPE 2 DIABETES MELLITUS WITH MODERATE NONPROLIFERATIVE RETINOPATHY OF BOTH EYES AND MACULAR EDEMA, UNSPECIFIED WHETHER LONG TERM INSULIN USE (H): ICD-10-CM

## 2022-10-12 PROCEDURE — 92134 CPTRZ OPH DX IMG PST SGM RTA: CPT | Performed by: OPHTHALMOLOGY

## 2022-10-12 PROCEDURE — 99214 OFFICE O/P EST MOD 30 MIN: CPT | Mod: 25 | Performed by: OPHTHALMOLOGY

## 2022-10-12 PROCEDURE — G0463 HOSPITAL OUTPT CLINIC VISIT: HCPCS | Mod: 25

## 2022-10-12 PROCEDURE — 67028 INJECTION EYE DRUG: CPT | Mod: LT | Performed by: OPHTHALMOLOGY

## 2022-10-12 PROCEDURE — 250N000011 HC RX IP 250 OP 636: Performed by: OPHTHALMOLOGY

## 2022-10-12 RX ADMIN — AFLIBERCEPT 2 MG: 40 INJECTION, SOLUTION INTRAVITREAL at 10:46

## 2022-10-12 ASSESSMENT — SLIT LAMP EXAM - LIDS
COMMENTS: SMALL PAPILLOMA ALONG LL MARGIN, NON CONCERNING
COMMENTS: NORMAL

## 2022-10-12 ASSESSMENT — CONF VISUAL FIELD
OS_INFERIOR_TEMPORAL_RESTRICTION: 0
OD_NORMAL: 1
OD_INFERIOR_TEMPORAL_RESTRICTION: 0
OD_SUPERIOR_NASAL_RESTRICTION: 0
OS_NORMAL: 1
OS_SUPERIOR_NASAL_RESTRICTION: 0
OS_INFERIOR_NASAL_RESTRICTION: 0
METHOD: COUNTING FINGERS
OD_INFERIOR_NASAL_RESTRICTION: 0
OS_SUPERIOR_TEMPORAL_RESTRICTION: 0
OD_SUPERIOR_TEMPORAL_RESTRICTION: 0

## 2022-10-12 ASSESSMENT — REFRACTION_WEARINGRX
SPECS_TYPE: PAL
OS_ADD: +2.00
OS_CYLINDER: +0.50
OD_AXIS: 131
OD_CYLINDER: +0.75
OS_SPHERE: -6.75
OS_AXIS: 044
OD_ADD: +2.00
OD_SPHERE: -7.00

## 2022-10-12 ASSESSMENT — EXTERNAL EXAM - LEFT EYE: OS_EXAM: NORMAL

## 2022-10-12 ASSESSMENT — TONOMETRY
IOP_METHOD: TONOPEN
OS_IOP_MMHG: 18
OD_IOP_MMHG: 21

## 2022-10-12 ASSESSMENT — CUP TO DISC RATIO
OS_RATIO: 0.3
OD_RATIO: 0.2

## 2022-10-12 ASSESSMENT — VISUAL ACUITY
OS_CC: 20/30
OD_CC+: -1+1
OD_CC: 20/25
METHOD: SNELLEN - LINEAR
OS_CC+: -2
CORRECTION_TYPE: GLASSES

## 2022-10-12 ASSESSMENT — EXTERNAL EXAM - RIGHT EYE: OD_EXAM: NORMAL

## 2022-10-12 NOTE — PROGRESS NOTES
CC:  Follow up Diabetic macular edema and retinopathy     HPI: Frandy Workman is a  57 year old year-old patient with history of Diabetes mellitus for 24 ys . Patient on insulin.      Interval History: Doing well, here for possible PRP right eye today. S/p PRP left eye at last visit.     Lab Results   Component Value Date    A1C 6.0 01/10/2022    A1C 6.8 07/13/2021    A1C 6.0 12/30/2020    A1C 6.3 06/13/2020    A1C 6.6 08/08/2018    A1C 6.5 06/09/2017    A1C 7.8 10/25/2016     Retinal Imaging:  OCT 10/12/22   RE: mild Diabetic macular edema, mildly worse c/w last visit. Vitreous attached. Small intraretinal fluid inf macula  LE: mild Diabetic macular edema, worse when c/w last visit, mild Epiretinal membrane; trace cystic changes    fluorescein angiography 09/28/22  limited by oral dye. No obvious NVE, but peripheral leakage and capillary non perfusion; neovascularization elsewhere left eye probably not seen because of  Vitreous hemorrhage and oral fluorescein    Optos consistent with clinical exam    Assessment & Plan:    # new vitreous hemorrhage left eye 10/12/22   Recommend Eylea inj   Recommend Panretinal laser photocoagulation (PRP) filling next follow up     # Diabetic macular edema both eyes   - status post avastin x 4. Switch to Eylea 4/24/19 with improvement of Diabetic macular edema   - mild DME each eye today (right eye same, left eye worse)   - Last Eylea injection was 5/2021 both eyes   - plan for Eylea left eye 10/12/22     # Proliferative diabetic retinopathy left eye   - s/p PRP left eye 9/28/22 (#437)  #severe nonproliferative diabetic retinopathy right eye    - Given [facility] 20/25 stable, stable DME  - consider PRP right eye in the future    #T2DM   - HbA1c 6.0% (1/2022)  - Blood pressure (<120/80) and blood glucose (HbA1c <7.0, ~6.5 today) control discussed with patient. Patient advised that failure to adequately control each may lead to vision loss. The patient expressed understanding.    #.  Senile nuclear sclerosis, bilateral   visually significant both eyes    Glare Testing (BAT)     Off Low Medium High   Right 20/25 20/25 20/30 20/40   Left 20/25 20/40 20/50 20/60+1   Edited by: Elin Rodas CO     Visually significant:YES  Glare: Positive   Reports impacts ADL   Dilation: 5 mm  Iris expansion: yes; likely  Pseudoexfoliation: NO  Trypan Blue: yes  Phacodonesis: No  Cornea guttae: rare  Aim for:  Start artificial tears twice a day and as needed    Anesthesia: peribulbar block  Surgical time: 30 min  Candidate for toric IOL:   Anticoagulants: NO  Alpha blockers/Flomax: YES Patient on flomax   I reviewed the indications, risks, benefits, and alternatives of the proposed surgical procedure. We discussed at length cataracts and the effect of the cataracts on vision.   We discussed the fact that modern cataract surgery is usually successful at alleviating symptoms of glare when the cataract is the causative factor. Other risks were discussed with patient including, but not limited to, failure to improve vision or further loss of vision,  and need for additional surgery, bleeding, infection, loss of vision and the remote possibility of complications of anesthesia. 1:1000 risk of infection/bleed/loss of eye; 1:100 risk of RD and need for further surgery. Patient agreed to proceed with surgery.  I provided multiple opportunities for the questions, answered all questions to the best of my ability, and confirmed that my answers and my discussion were understood.     # Dry eye syndrome   Left eye worse than right eye   warm compresses and artificial tears  As needed  -punctal plugs previously left eye - reports this is better     # Status post liver transplant   - tx 11/2019   - severe anemia; s/p transfusion - anemia much improved    - being seen by his primary team    PLAN:  eylea inj left eye 10/12/22   Follow up  2-3 weeks Will consider Panretinal laser photocoagulation (PRP) right eye  Sarah Rivas  MD  Follow up 4-6 weeks with Optical Coherence Tomography and possible eylea both eyes     Retina detachment precautions were discussed with the patient (presence or increased in flashes, floaters or a curtain in the visual field) and was asked to return if any of the those occur     Sarah Rivas  Resident Physician, PGY-3  Department of Ophthalmology  10/12/22 9:49 AM    ~~~~~~~~~~~~~~~~~~~~~~~~~~~~~~~~~~   Complete documentation of historical and exam elements from today's encounter can be found in the full encounter summary report (not reduplicated in this progress note).  I personally obtained the chief complaint(s) and history of present illness.  I confirmed and edited as necessary the review of systems, past medical/surgical history, family history, social history, and examination findings as documented by others; and I examined the patient myself.  I personally reviewed the relevant tests, images, and reports as documented above.  I personally reviewed the ophthalmic test(s) associated with this encounter, agree with the interpretation(s) as documented by the resident/fellow, and have edited the corresponding report(s) as necessary.   I formulated and edited as necessary the assessment and plan and discussed the findings and management plan with the patient and family and I was present for the entire procedure performed by the resident/fellow.    Enriqueta Martin MD   of Ophthalmology.  Retina Service   Department of Ophthalmology and Visual Neurosciences   [facility]  Phone: [phone]   Fax: [phone]

## 2022-10-12 NOTE — NURSING NOTE
Chief Complaints and History of Present Illnesses   Patient presents with     Follow Up     Chief Complaint(s) and History of Present Illness(es)     Follow Up           Comments    Patient states that his vision is the same since he was here last. He states that he still has a little spot. He does have floaters. No flashes of light. No pain and irritation.     Ocular Meds:artifical tears as needed   BS:175 this morning   Lab Results       Component                Value               Date                       A1C                      6.0                 01/10/2022                 A1C                      6.8                 07/13/2021                 A1C                      6.0                 12/30/2020                 A1C                      6.3                 06/13/2020                 A1C                      6.6                 08/08/2018                 A1C                      6.5                 06/09/2017                 A1C                      7.8                 10/25/2016              Arie BERNSTEIN, October 12, 2022 9:01 AM

## 2022-10-18 DIAGNOSIS — Z94.4 LIVER TRANSPLANT RECIPIENT (H): ICD-10-CM

## 2022-10-18 DIAGNOSIS — Z95.1 S/P CABG (CORONARY ARTERY BYPASS GRAFT): ICD-10-CM

## 2022-10-19 ENCOUNTER — VIRTUAL VISIT (OUTPATIENT)
Dept: BEHAVIORAL HEALTH | Facility: CLINIC | Age: 57
End: 2022-10-19
Payer: MEDICARE

## 2022-10-19 DIAGNOSIS — F31.31 BIPOLAR 1 DISORDER, DEPRESSED, MILD (H): Primary | ICD-10-CM

## 2022-10-19 PROCEDURE — 90832 PSYTX W PT 30 MINUTES: CPT | Mod: 95 | Performed by: PSYCHOLOGIST

## 2022-10-19 ASSESSMENT — PATIENT HEALTH QUESTIONNAIRE - PHQ9
SUM OF ALL RESPONSES TO PHQ QUESTIONS 1-9: 2
SUM OF ALL RESPONSES TO PHQ QUESTIONS 1-9: 2
10. IF YOU CHECKED OFF ANY PROBLEMS, HOW DIFFICULT HAVE THESE PROBLEMS MADE IT FOR YOU TO DO YOUR WORK, TAKE CARE OF THINGS AT HOME, OR GET ALONG WITH OTHER PEOPLE: SOMEWHAT DIFFICULT

## 2022-10-19 NOTE — PROGRESS NOTES
[facility]: Integrated Behavioral Health  October 19, 2022      Behavioral Health Clinician Progress Note    Patient Name: Frandy Workman           Service Type: Video visit      Service Location:  Video visit      Session Start Time: 2:10  Session End Time:  2:39      Session Length: 16 - 37      Attendees: Patient    Visit Activities (Refresh list every visit): [facility] Only     Service Modality:  Video Visit:      Provider verified identity through the following two step process.  Patient provided:  Patient photo and Patient is known previously to provider    Telemedicine Visit: The patient's condition can be safely assessed and treated via synchronous audio and visual telemedicine encounter.      Reason for Telemedicine Visit: Services only offered telehealth    Originating Site (Patient Location): Patient's home    D[place] ([place] Location[place] [facility] [facility] [place]    Consent:  The patient/guardian has verbally consented to: the potential risks and benefits of telemedicine (video visit) versus in person care; bill my insurance or make self-payment for services provided; and responsibility for payment of non-covered services.     Patient would like the video invitation sent by:  My Chart    Mode of Communication:  Video Conference via [employer]    Distant Location (Provider):  On-site    As the provider I attest to compliance with applicable laws and regulations related to telemedicine.      Diagnostic Assessment Date:  12/03/2020  Treatment Plan Review Date:  12/29/2022  See Flowsheets for today's PHQ-9 and LIZZETTE-7 results  Previous PHQ-9:   PHQ-9 SCORE 7/12/2022 9/8/2022 10/19/2022   PHQ-9 Total Score MyChart 4 (Minimal depression) 3 (Minimal depression) 2 (Minimal depression)   PHQ-9 Total Score 4 3 2     Previous LIZZETTE-7:   LIZZETTE-7 SCORE 12/29/2020 4/7/2021 9/30/2021   Total Score 8 (mild anxiety) 9 (mild anxiety) -   Total Score 8 9 10       Extended Session (60+  minutes): No  Interactive Complexity: No  Crisis: No    Treatment Objective(s) Addressed in This Session:  Target Behavior(s): disease management/lifestyle changes  related to adaptive approaches to managing anxious distress and mood difficulties    Relationship concerns/Stress management  Grief management    Current Stressors / Issues:  [facility] met with Miller today for a follow-up. Discussed experience with his father's . Notes seeing his daughter there, which was difficult for him. Reports he did not interact with his daughter and was able to deliver his speech about his father well, though notes feeling emotionally exhausted from the event. Reports primary concern with learning that his brother talked negatively about him during the event to a friend of his who was in attendance. Helped Miller specifically identify emotional experiences including hurt and betrayal. Continued to provide supportive counseling. Helped direct Miller to focusing on coping now, what he will do to manage his stress and grief from his father's passing. Discussed ACT ideas to help facilitate discussion.     Answers for HPI/ROS submitted by the patient on 2022  If you checked off any problems, how difficult have these problems made it for you to do your work, take care of things at home, or get along with other people?: Somewhat difficult  PHQ9 TOTAL SCORE: 4    Answers for HPI/ROS submitted by the patient on 2022  If you checked off any problems, how difficult have these problems made it for you to do your work, take care of things at home, or get along with other people?: Somewhat difficult  PHQ9 TOTAL SCORE: 3        Progress on Treatment Objective(s) / Homework:  Stable - MAINTENANCE (Working to maintain change, with risk of relapse); Intervened by continuing to positively reinforce healthy behavior choice       Solution-Focused Therapy    Explore patterns in patient's relationships and discuss options for new behaviors.    Explore  patterns in patient's actions and choices and discuss options for new behaviors.    Supportive Psychotherapy    ACT ideas    Answers for HPI/ROS submitted by the patient on 3/21/2022  If you checked off any problems, how difficult have these problems made it for you to do your work, take care of things at home, or get along with other people?: Not difficult at all  PHQ9 TOTAL SCORE: 6      Answers for HPI/ROS submitted by the patient on 2/8/2022  If you checked off any problems, how difficult have these problems made it for you to do your work, take care of things at home, or get along with other people?: Somewhat difficult  PHQ9 TOTAL SCORE: 4      Answers for HPI/ROS submitted by the patient on 4/7/2021   LIZZETTE 7 TOTAL SCORE: 9  If you checked off any problems, how difficult have these problems made it for you to do your work, take care of things at home, or get along with other people?: Very difficult  PHQ9 TOTAL SCORE: 7*    *No SI    Answers for HPI/ROS submitted by the patient on 10/13/2020   If you checked off any problems, how difficult have these problems made it for you to do your work, take care of things at home, or get along with other people?: Somewhat difficult  PHQ9 TOTAL SCORE: 8*  LIZZETTE 7 TOTAL SCORE: 6      *No SI     Answers for HPI/ROS submitted by the patient on 12/29/2020   If you checked off any problems, how difficult have these problems made it for you to do your work, take care of things at home, or get along with other people?: Somewhat difficult  PHQ9 TOTAL SCORE: 5*  LIZZETTE 7 TOTAL SCORE: 8    *No SI       Care Plan review completed: yes    Medication Review:  No changes to current psychiatric medication(s)     Current Outpatient Medications   Medication     Acetaminophen (TYLENOL) 325 MG CAPS     ammonium lactate (AMLACTIN) 12 % external cream     ARIPiprazole (ABILIFY) 5 MG tablet     aspirin (SM ASPIRIN ADULT LOW STRENGTH) 81 MG EC tablet     BD VIKTORIA U/F 32G X 4 MM insulin pen needle      ciclopirox (LOPROX) 0.77 % cream     Continuous Blood Gluc  (FREESTYLE CLAUDIA 14 DAY READER) CECILIO     Continuous Blood Gluc Sensor (FREESTYLE CLAUDIA 2 SENSOR) MISC     cyanocobalamin (VITAMIN B-12) 1000 MCG tablet     insulin aspart (NOVOLOG PEN) 100 UNIT/ML pen     insulin degludec (TRESIBA FLEXTOUCH) 100 UNIT/ML pen     JARDIANCE 10 MG TABS tablet     lamiVUDine (EPIVIR) 100 MG tablet     lisinopril (ZESTRIL) 5 MG tablet     methocarbamol (ROBAXIN) 750 MG tablet     metoprolol succinate ER (TOPROL-XL) 25 MG 24 hr tablet     Multiple Vitamin (TAB-A-GLADIS) TABS     mycophenolate (GENERIC EQUIVALENT) 250 MG capsule     order for DME     pantoprazole (PROTONIX) 40 MG EC tablet     polyethylene glycol (MIRALAX) 17 g packet     predniSONE (DELTASONE) 5 MG tablet     rosuvastatin (CRESTOR) 5 MG tablet     senna-docusate (SENOKOT-S/PERICOLACE) 8.6-50 MG tablet     tamsulosin (FLOMAX) 0.4 MG capsule     vitamin D3 (VITAMIN D3) 50 mcg (2000 units) tablet     Current Facility-Administered Medications   Medication     aflibercept (EYLEA) injection prefilled syringe 2 mg     aflibercept (EYLEA) injection prefilled syringe 2 mg         Medication Compliance:  Yes    Changes in Health Issues:   None reported    Chemical Use Review:   Substance Use: Chemical use reviewed, no active concerns identified      Tobacco Use: No current tobacco use.         Assessment: Current Emotional / Mental Status (status of significant symptoms):  Risk status (Self / Other harm or suicidal ideation)  Patient denies a history of suicidal ideation, suicide attempts, self-injurious behavior, homicidal ideation, homicidal behavior and and other safety concerns     Patient denies current fears or concerns for personal safety.  Patient denies current or recent suicidal ideation or behaviors.  Patient denies current or recent homicidal ideation or behaviors.  Patient denies current or recent self injurious behavior or ideation.  Patient denies other  safety concerns.     A safety and risk management plan has not been developed at this time, however patient was encouraged to call [facility] should there be a change in any of these risk factors.    [place] Suicide Severity Rating Scale (Short Version)  [place] Suicide Severity Rating (Short Version) 11/10/2019 12/2/2019 12/10/2019 6/11/2020 7/1/2020 7/12/2021 1/10/2022   Over the past 2 weeks have you felt down, depressed, or hopeless? no yes yes no no no no   Over the past 2 weeks have you had thoughts of killing yourself? no yes no no no no no   Comments - lots of stressors, has thought about not taking meds - - - - -   Have you ever attempted to kill yourself? no no no no no no no   Q1 Wished to be Dead (Past Month) - other (see comments) no - - - -   Comments - why did i do this surgery - - - - -   Q2 Suicidal Thoughts (Past Month) - no no - - - -   Comments - wishes he wouldn't have gone through surgery - - - - -   Q3 Suicidal Thought Method - no no - - - -   Q4 Suicidal Intent without Specific Plan - no no - - - -   Q5 Suicide Intent with Specific Plan - no no - - - -   Q6 Suicide Behavior (Lifetime) - no no - - - -         Appearance:   Appropriate   Eye Contact:   Good   Psychomotor Behavior: Restless   Attitude:   Cooperative  Interested Friendly Pleasant  Orientation:   All  Speech   Rate / Production: Normal/ Responsive   Volume:  Normal   Mood:    Depressed ; improving  Affect:    Appropriate    Thought Content:  Clear   Thought Form:  Goal Directed  Logical   Insight:    Good     Diagnoses:  1. Bipolar 1 disorder, depressed, mild (H)          Collateral Reports Completed:  Not Applicable    Plan: (Homework, other):  Continue with this writer for individual psychotherapy in 1 month. He will continue to work on managing depression and anxiety through achievable behavioral activation ideas. Provided adaptive skills to manage stress and relationship conflict today.  No CD issues.     Joseph MILLS  RED Murillo  October 21, 2022          ______________________________________________________________________                                            Individual Treatment Plan    Patient's Name: Frandy Workman  YOB: 1965    Date of Creation: 3/1/2022  Date Treatment Plan Last Reviewed/Revised: September 29, 2022    DSM5 Diagnoses: 296.51 Bipolar I Disorder Current or Most Recent Episode Depressed, Mild or 300.02 (F41.1) Generalized Anxiety Disorder  Psychosocial / Contextual Factors: Post Solid Organ Tx  PROMIS (reviewed every 90 days): 4    Referral / Collaboration:  Referral to another professional/service is not indicated at this time..    Anticipated number of session for this episode of care: 4-6  Anticipation frequency of session: Every other week  Anticipated Duration of each session: 38-52 minutes  Treatment plan will be reviewed in 90 days or when goals have been changed.       MeasurableTreatment Goal(s) related to diagnosis / functional impairment(s)  Goal 1: Patient will experience a reduction in depressive symptoms along with a corresponding increase in positive emotion and life satisfaction.      Objective #A (Patient Action)    Patient will Increase interest, engagement, and pleasure in doing things.  Status: Continued - Date(s): 9/29/2022    Intervention(s)  Therapist will help patient identify pleasant and mastery oriented events that elicit positive, relaxed mood.    Objective #B  Patient will Decrease frequency and intensity of feeling down, depressed, hopeless.  Status: Continued - Date(s): 9/29/2022    Intervention(s)  Therapist will introduce patient to cognitive-behavioral and acceptance and commitment therapy topics aimed to help reduce depression and anxiety    Objective #C  Patient will Identify negative self-talk and behaviors: challenge core beliefs, myths, and actions  Improve concentration, focus, and mindfulness in daily activities .  Status: Continued - Date(s):  "9/29/2022    Intervention(s)  Therapist will help patient identify and manage negative self-talk and automatic thoughts; introduce patient to cognitive distortions; help patient develop cognitive diffusion techniques      Goal 2: Patient will experience a reduction in anxious symptoms, along with a corresponding increase in relaxed emotional states and life satisfaction.      Objective #A (Patient Action)  Patient will use cognitive-behavioral and thought diffusion strategies identified in therapy to challenge anxious thoughts.    Status: Continued - Date(s): 9/29/2022    Intervention(s)  Therapist will utilize CBT and ACT ideas to help patient challenge anxious thoughts and reduce intensity/duration of anxious distress    Objective #B  Patient will use \"worry time\" each day for 15 minutes of scheduled worry and then defer obsessive or anxious thinking until the next structured worry time.    Status: Continued - Date(s): 9/29/2022    Intervention(s)  Therapist will teach patient how to effectively utilize worry time and/or thought logs/journals each day and incorporate more relaxation behaviors into their routine.    Objective #C  Patient will identify the stressors which contribute to feelings of anxiety  Patient will increase engagement in adaptive coping skills and recreational activities, such as exercise and healthy socialization, to manage distress.  Status: Continued - Date(s): 9/29/2022    Intervention(s)  Therapist will help patient identify triggers/situational factors that contribute to anxiety and behavioral skills aimed to manage anxious distress.      Goal 3: Patiient will work toward adaptively managing bipolar-related depression and manic episodes      Objective #A (Patient Action)    Status: Continued - Date(s): COMPLETE    Patient will identify 2-3 signs or signals of emerging mood instability.    Intervention(s)  Therapist will provide educational materials on bipolar disorder and help identifying " triggers for mood instability.    Objective #B  Patient will identify 2-3 strategies for more effectively managing Bipolar Disorder.    Status: Continued - Date(s): COMPLETE    Intervention(s)  Therapist will teach emotional recognition/identification. Skills aimed to effectively manage bipolar disorder.      Other Possible Therapeutic Intervention(s):    Psycho-education regarding mental health diagnoses and treatment options    Supportive Therapy    Provide affirmations, reflections, and establish working rapport    Emphasize and reflect on strength of therapeutic relationship    Skills training    Explore skills useful to client in current situation.    Skills include assertiveness, communication, conflict management, problem-solving, relaxation, etc.    Solution-Focused Therapy    Explore patterns in patient's relationships and discuss options for new behaviors.    Explore patterns in patient's actions and choices and discuss options for new behaviors.    Cognitive-behavioral Therapy    Discuss common cognitive distortions, identify them in patient's life.    Explore ways to challenge, replace, and act against these cognitions.    Acceptance and Commitment Therapy    Explore and identify important values in patient's life.    Discuss ways to commit to behavioral activation around these values.    Psychodynamic psychotherapy    Discuss patient's emotional dynamics and issues and how they impact behaviors.    Explore patient's history of relationships and how they impact present behaviors.    Explore how to work with and make changes in these schemas and patterns.    Narrative Therapy    Explore the patient's story of his/her life from his/her perspective.    Explore alternate ways of understanding their experience, identifying exceptions, developing new themes.    Interpersonal Psychotherapy    Explore patterns in relationships that are effective or ineffective at helping patient reach their goals, find  satisfying experience.    Discuss new patterns or behaviors to engage in for improved social functioning.    Behavioral Activation    Discuss steps patient can take to become more involved in meaningful activity.    Identify barriers to these activities and explore possible solutions.    Mindfulness-Based Strategies    Discuss skills based on development and application of mindfulness.    Skills drawn from compassion-focused therapy, dialectical behavior therapy, mindfulness-based stress reduction, mindfulness-based cognitive therapy, etc.      Patient has reviewed and agreed to the above plan.      Joseph Murillo Psy.D, LP   Behavioral Health Clinician   -[facility]     9/29/2022  Answers for HPI/ROS submitted by the patient on 10/19/2022  If you checked off any problems, how difficult have these problems made it for you to do your work, take care of things at home, or get along with other people?: Somewhat difficult  PHQ9 TOTAL SCORE: 2

## 2022-10-20 DIAGNOSIS — Z95.1 S/P CABG (CORONARY ARTERY BYPASS GRAFT): ICD-10-CM

## 2022-10-20 RX ORDER — PANTOPRAZOLE SODIUM 40 MG/1
TABLET, DELAYED RELEASE ORAL
Qty: 90 TABLET | Refills: 3 | OUTPATIENT
Start: 2022-10-20

## 2022-10-20 RX ORDER — PANTOPRAZOLE SODIUM 40 MG/1
40 TABLET, DELAYED RELEASE ORAL
Qty: 90 TABLET | Refills: 0 | Status: SHIPPED | OUTPATIENT
Start: 2022-10-20 | End: 2023-01-19

## 2022-10-21 RX ORDER — METOPROLOL SUCCINATE 25 MG/1
12.5 TABLET, EXTENDED RELEASE ORAL DAILY
Qty: 60 TABLET | Refills: 3 | OUTPATIENT
Start: 2022-10-21

## 2022-10-24 DIAGNOSIS — Z95.1 S/P CABG (CORONARY ARTERY BYPASS GRAFT): ICD-10-CM

## 2022-10-24 RX ORDER — METOPROLOL SUCCINATE 25 MG/1
12.5 TABLET, EXTENDED RELEASE ORAL DAILY
Qty: 45 TABLET | Refills: 1 | Status: SHIPPED | OUTPATIENT
Start: 2022-10-24 | End: 2023-02-15

## 2022-10-24 NOTE — PROGRESS NOTES
Frandy Workman  is being evaluated via a billable video visit.      How would you like to obtain your AVS? [employer]  For the video visit, send the invitation by: Text to cell phone: [phone]  Will anyone else be joining your video visit? No          **needs refills today    Outcome for 10/24/22 9:33 AM: [employer] message sent  VIKY Oneill  Outcome for 10/24/22 1:46 PM: Patient advises he has not worn benjamin sensor since . Benjamin data on site is up to 10/14/22. Patient has been checking manually with meter since.   Yojana Wang, VIKY  Outcome for 10/24/22 1:48 PM: Replied to [employer] message  VIKY Oneill  Outcome for 10/27/22 10:15 AM: Glucose Readings sent via [employer]  VIKY Oneill     Start time: 10:38am  End time: 10:52am  Provider location- off site- home.   Patient location- off site- home.       HPI:     Frandy Workman is a 57 year old man here for follow up of type 2 diabetes complicated by nephropathy, retinopathy and neuropathy. 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  He usually sees Tish Toscano.  Today is the first time I am meeting him.  He reports that he has been having high glucose readings recently and he is not sure why.   He has been feeling more tired and not that hungry.  Not feeling himself.  He feels like he has been sleeping too much during the day.  No testing for COVID.  He has had no cough or SOB.  Otherwise feeling wll.      Dm Regimen:   - Tresiba 26 units /day.   - Carbohydrate coverage to 1 unit : 20 g of carbohydrate.   - Correction Novolounit Novolo mg /dl >180     - Empagliflozin 10 mg daily (had JERONIMO/hypovolemias on higher dose).    He has been averaging 180 mg/dL.      Recent glucose:         Diabetes Control:   Lab Results   Component Value Date    A1C 6.0  01/10/2022    A1C 6.8 07/13/2021    A1C 6.0 12/30/2020    A1C 6.3 06/13/2020    A1C 6.6 08/08/2018    A1C 6.5 06/09/2017    A1C 7.8 10/25/2016       Past Medical History:   Diagnosis Date     Anemia 2013     Arthritis      BPH (benign prostatic hyperplasia)      CAD (coronary artery disease) 4/1/2019     Cholelithiasis      Conductive hearing loss 08/16/2017     Depressive disorder 1986    Suffer effects throughout life     Gastroesophageal reflux disease 12/01/2014     HCC (hepatocellular carcinoma) (H) 1/22/2019     History of diabetic retinopathy 07/2018     HTN (hypertension)      HTN (hypertension) 11/20/2019     Hyperlipidemia      Liver cirrhosis secondary to ESTRADA (H)      Liver transplanted (H) 11/11/2019     Portal vein thrombosis 4/11/2019     Type II diabetes mellitus (H)        Past Surgical History:   Procedure Laterality Date     BYPASS GRAFT ARTERY CORONARY N/A 7/14/2021    Procedure: median sternotomy, on cardiopulmonary bypass, CORONARY ARTERY BYPASS GRAFT (CABG) x2 with left greater saphenous vein endoscopic harvest and left internal mammery artery harvest;  Surgeon: Tom Zapata MD;  Location: [facility]     COLONOSCOPY      2015     COLONOSCOPY N/A 12/6/2019    Procedure: COLONOSCOPY, WITH POLYPECTOMY AND BIOPSY;  Surgeon: Adam Morton MD;  Location: [facility] GI     CV CENTRAL VENOUS CATHETER PLACEMENT N/A 7/12/2021    Procedure: Central Venous Catheter Placement;  Surgeon: Fermin Polanco MD;  Location:  [facility]     CV CORONARY ANGIOGRAM N/A 7/12/2021    Procedure: Coronary Angiogram;  Surgeon: Fermin Polanco MD;  Location:  [facility]     CV HEART CATHETERIZATION WITH POSSIBLE INTERVENTION N/A 2/26/2019    Procedure: CORS;  Surgeon: Jagdish Hoyt MD;  Location:  [facility]     CV INTRA AORTIC BALLOON N/A 7/12/2021    Procedure: Intra Aortic Balloon Pump Insertion;  Surgeon: Fermin Polanco  MD;  Location:  [facility]ATH [facility]     ESOPHAGOSCOPY, GASTROSCOPY, DUODENOSCOPY (EGD), COMBINED N/A 2016    Procedure: COMBINED ESOPHAGOSCOPY, GASTROSCOPY, DUODENOSCOPY (EGD);  Surgeon: Santi Rosas MD;  Location:  GI     ESOPHAGOSCOPY, GASTROSCOPY, DUODENOSCOPY (EGD), COMBINED N/A 2017    Procedure: COMBINED ESOPHAGOSCOPY, GASTROSCOPY, DUODENOSCOPY (EGD);  EGD;  Surgeon: Santi Rosas MD;  Location:  GI     ESOPHAGOSCOPY, GASTROSCOPY, DUODENOSCOPY (EGD), COMBINED N/A 2018    Procedure: EGD;  Surgeon: Santi Rosas MD;  [facility]     ESOPHAGOSCOPY, GASTROSCOPY, DUODENOSCOPY (EGD), COMBINED N/A 2019    Procedure: ESOPHAGOGASTRODUODENOSCOPY, WITH BIOPSY;  Surgeon: Adam Morton MD;  Location:  GI     ESOPHAGOSCOPY, GASTROSCOPY, DUODENOSCOPY (EGD), COMBINED N/A 2020    Procedure: ESOPHAGOGASTRODUODENOSCOPY (EGD);  Surgeon: Santi Rosas MD;  Location:  GI     HEAD & NECK SURGERY      2017 at [facility].      IMPLANT GOLD WEIGHT EYELID Right 2017    Procedure: IMPLANT WEIGHT EYELID;  Right Upper Eyelid Weight, right tarsal strip lower eyelid;  Surgeon: Milana Malave MD;  Location: [facility]     IR CHEMO EMBOLIZATION  2019     KNEE SURGERY Left      ORTHOPEDIC SURGERY       PAROTIDECTOMY, RADICAL NECK DISSECTION Right 2017    Procedure: PAROTIDECTOMY, RADICAL NECK DISSECTION;  Right Superfacial Parotidectomy , Facial nerve repair. with [facility] facial nerve monitor.;  Surgeon: Asiya Morgan MD;  Location: [facility]     PICC INSERTION Left 2017    4fr SL BioFlo PICC, 44cm in the L basilic vein w/ tip in the low SVC     RETURN LIVER TRANSPLANT N/A 2019    Procedure: Exploratory laparotomy, hematoma evacuation, abdominal washout;  Surgeon: Александр Ramos MD;  [facility]     TRANSPLANT LIVER RECIPIENT  DONOR N/A 2019    Procedure: TRANSPLANT, LIVER, RECIPIENT,  DONOR;  Surgeon:  Александр Ramos MD;  Location: [facility] OR     VASCULAR SURGERY         Family History   Problem Relation Age of Onset     Prostate Cancer Maternal Grandfather      Substance Abuse Maternal Grandfather         Alcohol     Colon Cancer Father 60     Pancreatic Cancer Father 60     Prostate Cancer Father      Colorectal Cancer Father      Macular Degeneration Father      Cancer Father      Glaucoma Father      Skin Cancer Father      Colorectal Cancer Maternal Grandmother      Cancer Maternal Grandmother      Substance Abuse Maternal Grandmother         Alcohol     Colorectal Cancer Paternal Grandmother      Cancer Mother      Diabetes Mother          3/2016     Cerebrovascular Disease Mother         Passed away in Feb of this year, 80 years old.     Thyroid Disease Mother      Depression Mother      Asthma Sister         Had since birth     Thyroid Disease Sister      Depression Sister      Liver Disease No family hx of      Melanoma No family hx of        Social History     Socioeconomic History     Marital status:      Highest education level: Bachelor's degree (e.g., BA, AB, BS)   Tobacco Use     Smoking status: Former     Packs/day: 6.00     Years: 30.00     Pack years: 180.00     Types: Cigars, Cigarettes     Start date: 2016     Quit date: 10/25/2017     Years since quittin.0     Smokeless tobacco: Former     Types: Chew     Quit date: 10/31/2017     Tobacco comments:     1 tin per week   Substance and Sexual Activity     Alcohol use: No     Alcohol/week: 0.0 standard drinks     Comment: quit 1996     Drug use: No     Sexual activity: Not Currently     Partners: Female     Birth control/protection: Condom   Other Topics Concern     Parent/sibling w/ CABG, MI or angioplasty before 65F 55M? Yes   Social History Narrative    Prior , [facility]     Social Determinants of Health     Intimate Partner Violence: Not At Risk     Fear of Current or Ex-Partner: No     Emotionally Abused:  No     Physically Abused: No     Sexually Abused: No       Current Outpatient Medications   Medication     pantoprazole (PROTONIX) 40 MG EC tablet     Acetaminophen (TYLENOL) 325 MG CAPS     ammonium lactate (AMLACTIN) 12 % external cream     ARIPiprazole (ABILIFY) 5 MG tablet     aspirin (SM ASPIRIN ADULT LOW STRENGTH) 81 MG EC tablet     BD VIKTORIA U/F 32G X 4 MM insulin pen needle     ciclopirox (LOPROX) 0.77 % cream     Continuous Blood Gluc  (FREESTYLE CLAUDIA 14 DAY READER) CECILIO     Continuous Blood Gluc Sensor (FREESTYLE CLAUDIA 2 SENSOR) MISC     cyanocobalamin (VITAMIN B-12) 1000 MCG tablet     insulin aspart (NOVOLOG PEN) 100 UNIT/ML pen     insulin degludec (TRESIBA FLEXTOUCH) 100 UNIT/ML pen     JARDIANCE 10 MG TABS tablet     lamiVUDine (EPIVIR) 100 MG tablet     lisinopril (ZESTRIL) 5 MG tablet     methocarbamol (ROBAXIN) 750 MG tablet     metoprolol succinate ER (TOPROL XL) 25 MG 24 hr tablet     Multiple Vitamin (TAB-A-GLADIS) TABS     mycophenolate (GENERIC EQUIVALENT) 250 MG capsule     order for DME     polyethylene glycol (MIRALAX) 17 g packet     predniSONE (DELTASONE) 5 MG tablet     rosuvastatin (CRESTOR) 5 MG tablet     senna-docusate (SENOKOT-S/PERICOLACE) 8.6-50 MG tablet     tamsulosin (FLOMAX) 0.4 MG capsule     vitamin D3 (VITAMIN D3) 50 mcg (2000 units) tablet     Current Facility-Administered Medications   Medication     aflibercept (EYLEA) injection prefilled syringe 2 mg     aflibercept (EYLEA) injection prefilled syringe 2 mg          Allergies   Allergen Reactions     Codeine Other (See Comments)     Cannot take due to liver  Cannot tolerate oral narcotics     Seasonal Allergies      Sneezing, coughing, runny and itchy eyes       Physical Exam  There were no vitals taken for this visit.  GENERAL: healthy, alert and no distress  RESP: no audible wheeze, cough, or visible cyanosis.  No visible retractions or increased work of breathing.  Able to speak fully in complete  sentences.  PSYCH: mentation appears normal, affect normal/bright, judgement and insight intact, normal speech and appearance well-groomed    RESULTS  Lab Results   Component Value Date    A1C 6.0 (H) 01/10/2022    A1C 6.8 (H) 07/13/2021    A1C 6.0 (H) 12/30/2020    A1C 6.3 (H) 06/13/2020    A1C 6.6 (H) 08/08/2018    A1C 6.5 (H) 06/09/2017    A1C 7.8 (H) 10/25/2016    HEMOGLOBINA1 4.8 03/04/2020    HEMOGLOBINA1 5.1 12/02/2019    HEMOGLOBINA1 5.4 08/14/2019    HEMOGLOBINA1 6.1 (A) 02/11/2019    HEMOGLOBINA1 8.1 (A) 04/11/2018       TSH   Date Value Ref Range Status   04/25/2022 1.24 0.40 - 4.00 mU/L Final   10/04/2021 1.90 0.40 - 4.00 mU/L Final   01/28/2021 0.91 0.40 - 4.00 mU/L Final   01/24/2020 1.91 0.40 - 4.00 mU/L Final   08/08/2018 0.49 0.40 - 4.00 mU/L Final   02/08/2018 0.71 0.40 - 4.00 mU/L Final   06/09/2017 0.42 0.40 - 4.00 mU/L Final     T4 Free   Date Value Ref Range Status   08/08/2018 1.21 0.76 - 1.46 ng/dL Final       ALT   Date Value Ref Range Status   07/13/2022 21 0 - 70 U/L Final   06/08/2022 26 0 - 70 U/L Final   06/28/2021 20 0 - 70 U/L Final   06/14/2021 21 0 - 70 U/L Final   ]    Recent Labs   Lab Test 09/07/21  1025 09/25/20  0859   CHOL 176 146   HDL 34* 39*   LDL 95 61   TRIG 233* 228*       Lab Results   Component Value Date     09/07/2022     06/28/2021      Lab Results   Component Value Date    POTASSIUM 4.7 09/07/2022    POTASSIUM 4.7 07/20/2022    POTASSIUM 4.6 06/28/2021     Lab Results   Component Value Date    CHLORIDE 103 09/07/2022    CHLORIDE 105 07/20/2022    CHLORIDE 107 06/28/2021     Lab Results   Component Value Date    DEBORAH 10.1 09/07/2022    DEBORAH 9.1 06/28/2021     Lab Results   Component Value Date    CO2 27 09/07/2022    CO2 26 07/20/2022    CO2 25 06/28/2021     Lab Results   Component Value Date    BUN 32.9 09/07/2022    BUN 32 07/20/2022    BUN 33 06/28/2021     Lab Results   Component Value Date    CR 1.47 09/07/2022    CR 1.62 06/28/2021       GFR  Estimate   Date Value Ref Range Status   09/07/2022 55 (L) >60 mL/min/1.73m2 Final     Comment:     Effective December 21, 2021 eGFRcr in adults is calculated using the 2021 CKD-EPI creatinine equation which includes age and gender (Allie et al., [facility], DOI: 10.1056/XVVMxx7763131)   07/20/2022 42 (L) >60 mL/min/1.73m2 Final     Comment:     Effective December 21, 2021 eGFRcr in adults is calculated using the 2021 CKD-EPI creatinine equation which includes age and gender (Allie et al., [facility], DOI: 10.1056/WGCPmy1317958)   07/13/2022 42 (L) >60 mL/min/1.73m2 Final     Comment:     Effective December 21, 2021 eGFRcr in adults is calculated using the 2021 CKD-EPI creatinine equation which includes age and gender (Allie et al., [facility], DOI: 10.1056/GBRKxz5803484)   06/28/2021 46 (L) >60 mL/min/[1.73_m2] Final     Comment:     Non  GFR Calc  Starting 12/18/2018, serum creatinine based estimated GFR (eGFR) will be   calculated using the Chronic Kidney Disease Epidemiology Collaboration   (CKD-EPI) equation.     06/14/2021 49 (L) >60 mL/min/[1.73_m2] Final     Comment:     Non  GFR Calc  Starting 12/18/2018, serum creatinine based estimated GFR (eGFR) will be   calculated using the Chronic Kidney Disease Epidemiology Collaboration   (CKD-EPI) equation.     06/04/2021 46 (L) >60 mL/min/[1.73_m2] Final     Comment:     Non  GFR Calc  Starting 12/18/2018, serum creatinine based estimated GFR (eGFR) will be   calculated using the Chronic Kidney Disease Epidemiology Collaboration   (CKD-EPI) equation.       GFR, ESTIMATED POCT   Date Value Ref Range Status   06/08/2022 46 (L) >60 mL/min/1.73m2 Final   11/26/2021 47 (L) >60 mL/min/1.73m2 Final   09/24/2021 55 (L) >60 mL/min/1.73m2 Final     GFR Estimate If Black   Date Value Ref Range Status   06/28/2021 54 (L) >60 mL/min/[1.73_m2] Final     Comment:      GFR Calc  Starting 12/18/2018, serum creatinine based estimated  GFR (eGFR) will be   calculated using the Chronic Kidney Disease Epidemiology Collaboration   (CKD-EPI) equation.     06/14/2021 57 (L) >60 mL/min/[1.73_m2] Final     Comment:      GFR Calc  Starting 12/18/2018, serum creatinine based estimated GFR (eGFR) will be   calculated using the Chronic Kidney Disease Epidemiology Collaboration   (CKD-EPI) equation.     06/04/2021 53 (L) >60 mL/min/[1.73_m2] Final     Comment:      GFR Calc  Starting 12/18/2018, serum creatinine based estimated GFR (eGFR) will be   calculated using the Chronic Kidney Disease Epidemiology Collaboration   (CKD-EPI) equation.         Lab Results   Component Value Date    MICROL 298.0 09/07/2022    MICROL 47 04/20/2022    MICROL 563 02/26/2021     No results found for: MICROALBUMIN  No results found for: CPEPT, GADAB, ISCAB    Vitamin B12   Date Value Ref Range Status   01/11/2022 2,179 (H) 193 - 986 pg/mL Final   06/13/2020 682 193 - 986 pg/mL Final   02/04/2019 3,006 (H) 193 - 986 pg/mL Final   ]    Most recent eye exam date: : Not Found     Assessment/Plan:     1.  Type 2 diabetes- Doing ok, but recent glucose has been running higher.  Would likely benefit from higher dose SGLT_2 inhibitor in the future, but he did have JERONIMO with illness/dehydration in the past.  Reminded him to stop jardiance during times of poor po intake and reminded him to stay well hydrated. We made the following plan today (instructions given to patient):   Increase Tresiba to 28 units daily.      Increase mealtime insulin to 1/15g carb.      Stop Jardiance during times of illness or dehydration.     Please let me know if you are having low blood sugars less than 70 or over 300 mg/dL.      If you have concerns, please send me a [employer] message M-Th, call the clinic at [phone], or call [phone] after hours/weekends and ask to speak with the endocrinologist on call.      2.  Risk factors-     Retinopathy:  Yes.  Had eye exam within  last year.   Nephropathy:  BP historically well controlled.+ albuminuria. On SGLT-2 inhibitor and lisinopril.  Creatinine stable. GFR about 50.    Neuropathy: Yes.   Feet: OK, no ulcers. Sees podiatry.   Lipids:  LDL at target.  Patient taking statin.  Will check lipids.     3.  F/U in 3 months with Tish Toscano or myself, sooner with concerns.     38 minutes spent on the date of the encounter doing chart review, review of test results, review and interpretation of glucose data, patient visit and documentation, counseling/coordination of care, and discussion of follow up plan for worsening hyper and hypoglycemia.  The patient understood and is satisfied with today's visit.          Frannie Vasquez PA-C, MPAS   [facility]  Department of Medicine  Division of Endocrinology and Diabetes

## 2022-10-24 NOTE — TELEPHONE ENCOUNTER
Last Clinic Visit: 3/15/2022  [facility]

## 2022-10-24 NOTE — TELEPHONE ENCOUNTER
This was denied stating refills sent on 2021 that script has  since we can only use for a year from the written date can we please get a new one sent pt needs meds and we dont have an active script

## 2022-10-25 DIAGNOSIS — E11.3313 TYPE 2 DIABETES MELLITUS WITH MODERATE NONPROLIFERATIVE RETINOPATHY OF BOTH EYES AND MACULAR EDEMA, UNSPECIFIED WHETHER LONG TERM INSULIN USE (H): Primary | ICD-10-CM

## 2022-10-27 ENCOUNTER — OFFICE VISIT (OUTPATIENT)
Dept: ENDOCRINOLOGY | Facility: CLINIC | Age: 57
End: 2022-10-27
Payer: MEDICARE

## 2022-10-27 DIAGNOSIS — N18.31 TYPE 2 DIABETES MELLITUS WITH STAGE 3A CHRONIC KIDNEY DISEASE, WITH LONG-TERM CURRENT USE OF INSULIN (H): ICD-10-CM

## 2022-10-27 DIAGNOSIS — E11.69 TYPE 2 DIABETES MELLITUS WITH DIABETIC FOOT DEFORMITY (H): ICD-10-CM

## 2022-10-27 DIAGNOSIS — L60.2 ONYCHAUXIS: ICD-10-CM

## 2022-10-27 DIAGNOSIS — Z79.4 TYPE 2 DIABETES MELLITUS WITH STAGE 3A CHRONIC KIDNEY DISEASE, WITH LONG-TERM CURRENT USE OF INSULIN (H): ICD-10-CM

## 2022-10-27 DIAGNOSIS — E11.49 TYPE II OR UNSPECIFIED TYPE DIABETES MELLITUS WITH NEUROLOGICAL MANIFESTATIONS, NOT STATED AS UNCONTROLLED(250.60) (H): Primary | ICD-10-CM

## 2022-10-27 DIAGNOSIS — M21.969 TYPE 2 DIABETES MELLITUS WITH DIABETIC FOOT DEFORMITY (H): ICD-10-CM

## 2022-10-27 DIAGNOSIS — E11.22 TYPE 2 DIABETES MELLITUS WITH STAGE 3A CHRONIC KIDNEY DISEASE, WITH LONG-TERM CURRENT USE OF INSULIN (H): ICD-10-CM

## 2022-10-27 PROCEDURE — 11719 TRIM NAIL(S) ANY NUMBER: CPT | Performed by: PODIATRIST

## 2022-10-27 PROCEDURE — 99214 OFFICE O/P EST MOD 30 MIN: CPT | Mod: 25 | Performed by: PODIATRIST

## 2022-10-27 ASSESSMENT — ENCOUNTER SYMPTOMS
ALTERED TEMPERATURE REGULATION: 0
EYE PAIN: 0
NIGHT SWEATS: 0
POOR WOUND HEALING: 0
HALLUCINATIONS: 0
DOUBLE VISION: 0
WEIGHT LOSS: 0
FATIGUE: 0
NAIL CHANGES: 0
FEVER: 0
POOR WOUND HEALING: 0
INCREASED ENERGY: 0
EYE WATERING: 1
HALLUCINATIONS: 0
SKIN CHANGES: 0
FEVER: 0
FATIGUE: 0
EYE REDNESS: 0
DECREASED APPETITE: 0
EYE WATERING: 1
POLYPHAGIA: 1
INCREASED ENERGY: 0
WEIGHT GAIN: 1
EYE REDNESS: 0
DECREASED APPETITE: 0
EYE IRRITATION: 1
NAIL CHANGES: 0
SKIN CHANGES: 0
WEIGHT GAIN: 1
CHILLS: 0
CHILLS: 0
ALTERED TEMPERATURE REGULATION: 0
NIGHT SWEATS: 0
EYE IRRITATION: 1
WEIGHT LOSS: 0
DOUBLE VISION: 0
POLYPHAGIA: 1
EYE PAIN: 0

## 2022-10-27 NOTE — LETTER
10/27/2022       RE: Frandy Workman  [address] [facility] [address]     Dear Colleague,    Thank you for referring your patient, Frandy Workman, to the [facility] [place] [facility]. Please see a copy of my visit note below.    Past Medical History:   Diagnosis Date     Anemia 2013     Arthritis      BPH (benign prostatic hyperplasia)      CAD (coronary artery disease) 4/1/2019     Cholelithiasis      Conductive hearing loss 08/16/2017     Depressive disorder 1986    Suffer effects throughout life     Gastroesophageal reflux disease 12/01/2014     HCC (hepatocellular carcinoma) (H) 1/22/2019     History of diabetic retinopathy 07/2018     HTN (hypertension)      HTN (hypertension) 11/20/2019     Hyperlipidemia      Liver cirrhosis secondary to ESTRADA (H)      Liver transplanted (H) 11/11/2019     Portal vein thrombosis 4/11/2019     Type II diabetes mellitus (H)      Patient Active Problem List   Diagnosis     Bipolar affective disorder in remission (H)     Esophageal varices determined by endoscopy (H)     Brow ptosis     Paralytic lagophthalmos of right upper eyelid     Erectile dysfunction due to diseases classified elsewhere     HCC (hepatocellular carcinoma) (H)     Equivocal stress echocardiogram     Type 2 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, unspecified whether long term insulin use (H)     Status post coronary angiogram     CAD (coronary artery disease)     Liver transplant recipient (H)     Status post liver transplantation (H)     Immunosuppressed status (H)     Benign essential hypertension     Malnutrition related to chronic disease (H)     Hypophosphatasia     Chronic kidney disease, stage 3a (H)     Malnutrition (H)     Anxiety     Mild recurrent major depression (H)     Anemia of chronic renal failure, stage 3a (H)     Rekha (H)     History of coronary  artery disease     Dyslipidemia     Constipation, unspecified constipation type     Excessive sweating     Stage 3b chronic kidney disease (H)     Anemia of chronic renal failure, stage 3b (H)     NSTEMI (non-ST elevated myocardial infarction) (H)     Chest pain, unspecified type     Past Surgical History:   Procedure Laterality Date     BYPASS GRAFT ARTERY CORONARY N/A 7/14/2021    Procedure: median sternotomy, on cardiopulmonary bypass, CORONARY ARTERY BYPASS GRAFT (CABG) x2 with left greater saphenous vein endoscopic harvest and left internal mammery artery harvest;  Surgeon: Tom Zapata MD;  Location: [facility] OR     COLONOSCOPY      2015     COLONOSCOPY N/A 12/6/2019    Procedure: COLONOSCOPY, WITH POLYPECTOMY AND BIOPSY;  Surgeon: Adam Morton MD;  Location: [facility] GI     CV CENTRAL VENOUS CATHETER PLACEMENT N/A 7/12/2021    Procedure: Central Venous Catheter Placement;  Surgeon: Fermin Polanco MD;  Location:  HEART CARDIAC CATH LAB     CV CORONARY ANGIOGRAM N/A 7/12/2021    Procedure: Coronary Angiogram;  Surgeon: Fermin Polanco MD;  Location:  HEART CARDIAC CATH LAB     CV HEART CATHETERIZATION WITH POSSIBLE INTERVENTION N/A 2/26/2019    Procedure: CORS;  Surgeon: Jagdish Hoyt MD;  Location:  HEART CARDIAC CATH LAB     CV INTRA AORTIC BALLOON N/A 7/12/2021    Procedure: Intra Aortic Balloon Pump Insertion;  Surgeon: Fermin Polanco MD;  Location:  HEART CARDIAC CATH LAB     ESOPHAGOSCOPY, GASTROSCOPY, DUODENOSCOPY (EGD), COMBINED N/A 11/17/2016    Procedure: COMBINED ESOPHAGOSCOPY, GASTROSCOPY, DUODENOSCOPY (EGD);  Surgeon: Santi Rosas MD;  Location:  GI     ESOPHAGOSCOPY, GASTROSCOPY, DUODENOSCOPY (EGD), COMBINED N/A 11/17/2017    Procedure: COMBINED ESOPHAGOSCOPY, GASTROSCOPY, DUODENOSCOPY (EGD);  EGD;  Surgeon: Santi Rosas MD;  Location:  GI     ESOPHAGOSCOPY, GASTROSCOPY, DUODENOSCOPY (EGD), COMBINED N/A  2018    Procedure: EGD;  Surgeon: Santi Rosas MD;  Location: [facility] OR     ESOPHAGOSCOPY, GASTROSCOPY, DUODENOSCOPY (EGD), COMBINED N/A 2019    Procedure: ESOPHAGOGASTRODUODENOSCOPY, WITH BIOPSY;  Surgeon: Adam Morton MD;  Location:  GI     ESOPHAGOSCOPY, GASTROSCOPY, DUODENOSCOPY (EGD), COMBINED N/A 2020    Procedure: ESOPHAGOGASTRODUODENOSCOPY (EGD);  Surgeon: Santi Rosas MD;  Location:  GI     HEAD & NECK SURGERY      2017 at [facility].      IMPLANT GOLD WEIGHT EYELID Right 2017    Procedure: IMPLANT WEIGHT EYELID;  Right Upper Eyelid Weight, right tarsal strip lower eyelid;  Surgeon: Milana Malave MD;  Location:  OR     IR CHEMO EMBOLIZATION  2019     KNEE SURGERY Left      ORTHOPEDIC SURGERY       PAROTIDECTOMY, RADICAL NECK DISSECTION Right 2017    Procedure: PAROTIDECTOMY, RADICAL NECK DISSECTION;  Right Superfacial Parotidectomy , Facial nerve repair. with [facility] facial nerve monitor.;  Surgeon: Asiya Morgan MD;  Location: [facility] OR     PICC INSERTION Left 2017    4fr SL BioFlo PICC, 44cm in the L basilic vein w/ tip in the low SVC     RETURN LIVER TRANSPLANT N/A 2019    Procedure: Exploratory laparotomy, hematoma evacuation, abdominal washout;  Surgeon: Александр Ramos MD;  Location: [facility] OR     TRANSPLANT LIVER RECIPIENT  DONOR N/A 2019    Procedure: TRANSPLANT, LIVER, RECIPIENT,  DONOR;  Surgeon: Александр Ramos MD;  Location: [facility] OR     VASCULAR SURGERY       Social History     Socioeconomic History     Marital status:      Spouse name: Not on file     Number of children: Not on file     Years of education: Not on file     Highest education level: Bachelor's degree (e.g., BA, AB, BS)   Occupational History     Not on file   Tobacco Use     Smoking status: Former     Packs/day: 6.00     Years: 30.00     Pack years: 180.00     Types: Cigars, Cigarettes     Start date: 2016     Quit date:  10/25/2017     Years since quittin.0     Smokeless tobacco: Former     Types: Chew     Quit date: 10/31/2017     Tobacco comments:     1 tin per week   Substance and Sexual Activity     Alcohol use: No     Alcohol/week: 0.0 standard drinks     Comment: quit 1996     Drug use: No     Sexual activity: Not Currently     Partners: Female     Birth control/protection: Condom   Other Topics Concern     Parent/sibling w/ CABG, MI or angioplasty before 65F 55M? Yes   Social History Narrative    Prior [facility]     Social Determinants of Health     Financial Resource Strain: Not on file   Food Insecurity: Not on file   Transportation Needs: Not on file   Physical Activity: Not on file   Stress: Not on file   Social Connections: Not on file   Intimate Partner Violence: Not At Risk     Fear of Current or Ex-Partner: No     Emotionally Abused: No     Physically Abused: No     Sexually Abused: No   Housing Stability: Not on file     Family History   Problem Relation Age of Onset     Prostate Cancer Maternal Grandfather      Substance Abuse Maternal Grandfather         Alcohol     Colon Cancer Father 60     Pancreatic Cancer Father 60     Prostate Cancer Father      Colorectal Cancer Father      Macular Degeneration Father      Cancer Father      Glaucoma Father      Skin Cancer Father      Colorectal Cancer Maternal Grandmother      Cancer Maternal Grandmother      Substance Abuse Maternal Grandmother         Alcohol     Colorectal Cancer Paternal Grandmother      Cancer Mother      Diabetes Mother          3/2016     Cerebrovascular Disease Mother         Passed away in Feb of this year, 80 years old.     Thyroid Disease Mother      Depression Mother      Asthma Sister         Had since birth     Thyroid Disease Sister      Depression Sister      Liver Disease No family hx of      Melanoma No family hx of        Answers for HPI/ROS submitted by the patient on 10/27/2022  General Symptoms: Yes  Skin  Symptoms: Yes  HENT Symptoms: No  EYE SYMPTOMS: Yes  HEART SYMPTOMS: No  LUNG SYMPTOMS: No  INTESTINAL SYMPTOMS: No  URINARY SYMPTOMS: No  REPRODUCTIVE SYMPTOMS: No  SKELETAL SYMPTOMS: No  BLOOD SYMPTOMS: No  NERVOUS SYSTEM SYMPTOMS: No  MENTAL HEALTH SYMPTOMS: No  Fever: No  Loss of appetite: No  Weight loss: No  Weight gain: Yes  Fatigue: No  Night sweats: No  Chills: No  Increased stress: No  Excessive hunger: Yes  Feeling hot or cold when others believe the temperature is normal: No  Loss of height: No  Post-operative complications: No  Surgical site pain: No  Hallucinations: No  Change in or Loss of Energy: No  Hyperactivity: No  Confusion: No  Changes in hair: Yes  Changes in moles/birth marks: No  Itching: No  Rashes: No  Changes in nails: No  Acne: Yes  Change in facial hair: Yes  Warts: No  Non-healing sores: No  Scarring: No  Flaking of skin: No  Color changes of hands/feet in cold : No  Sun sensitivity: No  Skin thickening: No  Eye pain: No  Vision loss: Yes  Dry eyes: Yes  Watery eyes: Yes  Eye bulging: No  Double vision: No  Flashing of lights: Yes  Spots: No  Floaters: No  Redness: No  Crossed eyes: No  Tunnel Vision: No  Yellowing of eyes: No  Eye irritation: Yes    Lab Results   Component Value Date    WBC 4.7 09/07/2022    WBC 4.4 06/28/2021     Lab Results   Component Value Date    RBC 4.08 09/07/2022    RBC 2.35 06/28/2021     Lab Results   Component Value Date    HGB 12.8 09/07/2022    HGB 7.3 06/28/2021     Lab Results   Component Value Date    HCT 40.3 09/07/2022    HCT 22.6 06/28/2021     No components found for: MCT  Lab Results   Component Value Date    MCV 99 09/07/2022    MCV 96 06/28/2021     Lab Results   Component Value Date    MCH 31.4 09/07/2022    MCH 31.1 06/28/2021     Lab Results   Component Value Date    MCHC 31.8 09/07/2022    MCHC 32.3 06/28/2021     Lab Results   Component Value Date    RDW 14.6 09/07/2022    RDW 15.1 06/28/2021     Lab Results   Component Value Date    PLT  119 09/07/2022     06/28/2021     Last Comprehensive Metabolic Panel:  Sodium   Date Value Ref Range Status   09/07/2022 140 136 - 145 mmol/L Final   06/28/2021 137 133 - 144 mmol/L Final     Potassium   Date Value Ref Range Status   09/07/2022 4.7 3.4 - 5.3 mmol/L Final   07/20/2022 4.7 3.4 - 5.3 mmol/L Final   06/28/2021 4.6 3.4 - 5.3 mmol/L Final     Chloride   Date Value Ref Range Status   09/07/2022 103 98 - 107 mmol/L Final   07/20/2022 105 94 - 109 mmol/L Final   06/28/2021 107 94 - 109 mmol/L Final     Carbon Dioxide   Date Value Ref Range Status   06/28/2021 25 20 - 32 mmol/L Final     Carbon Dioxide (CO2)   Date Value Ref Range Status   09/07/2022 27 22 - 29 mmol/L Final   07/20/2022 26 20 - 32 mmol/L Final     Anion Gap   Date Value Ref Range Status   09/07/2022 10 7 - 15 mmol/L Final   07/20/2022 7 3 - 14 mmol/L Final   06/28/2021 5 3 - 14 mmol/L Final     Glucose   Date Value Ref Range Status   09/07/2022 126 (H) 70 - 99 mg/dL Final   07/20/2022 269 (H) 70 - 99 mg/dL Final   06/28/2021 208 (H) 70 - 99 mg/dL Final     Urea Nitrogen   Date Value Ref Range Status   09/07/2022 32.9 (H) 6.0 - 20.0 mg/dL Final   07/20/2022 32 (H) 7 - 30 mg/dL Final   06/28/2021 33 (H) 7 - 30 mg/dL Final     Creatinine   Date Value Ref Range Status   09/07/2022 1.47 (H) 0.67 - 1.17 mg/dL Final   06/28/2021 1.62 (H) 0.66 - 1.25 mg/dL Final     GFR Estimate   Date Value Ref Range Status   09/07/2022 55 (L) >60 mL/min/1.73m2 Final     Comment:     Effective December 21, 2021 eGFRcr in adults is calculated using the 2021 CKD-EPI creatinine equation which includes age and gender (Allie et al., NEJM, DOI: 10.1056/QNHPhi8259393)   06/28/2021 46 (L) >60 mL/min/[1.73_m2] Final     Comment:     Non  GFR Calc  Starting 12/18/2018, serum creatinine based estimated GFR (eGFR) will be   calculated using the Chronic Kidney Disease Epidemiology Collaboration   (CKD-EPI) equation.       GFR, ESTIMATED POCT   Date Value  Ref Range Status   06/08/2022 46 (L) >60 mL/min/1.73m2 Final     Calcium   Date Value Ref Range Status   09/07/2022 10.1 (H) 8.6 - 10.0 mg/dL Final   06/28/2021 9.1 8.5 - 10.1 mg/dL Final     Lab Results   Component Value Date    AST 10 07/13/2022    AST 9 06/28/2021     Lab Results   Component Value Date    ALT 21 07/13/2022    ALT 20 06/28/2021     No results found for: BILICONJ   Lab Results   Component Value Date    BILITOTAL 0.6 07/13/2022    BILITOTAL 0.5 06/28/2021     Lab Results   Component Value Date    ALBUMIN 4.5 09/07/2022    ALBUMIN 4.3 07/20/2022    ALBUMIN 4.0 06/28/2021     Lab Results   Component Value Date    PROTTOTAL 7.8 07/13/2022    PROTTOTAL 7.4 06/28/2021      Lab Results   Component Value Date    ALKPHOS 104 07/13/2022    ALKPHOS 98 06/28/2021         SUBJECTIVE FINDINGS:  A 57-year-old male returns to clinic for foot check.  He relates he is doing okay.  Relates he has numbness, tingling and neuropathy in his toes.  Relates no ulcers or sores since we have seen him last.  Relates he needs his toenails trimmed.  No other specific relieving or aggravating factors.  Relates his Diabetic shoes that he wears are doing well.       OBJECTIVE FINDINGS:  DP and PT 2/4 bilaterally.  He has dorsally contracted digits 2-5 bilaterally.  He has incurvated nails with minimal thickening and dystrophy 1-5 bilaterally to differing degrees.  There is no erythema, no drainage, no odor, no calor bilaterally.  There are no gross tendon voids bilaterally.  He has a laterally deviated hallux bilaterally.       ASSESSMENT/PLAN:  Onychauxis bilaterally.  He is diabetic with peripheral Neuropathy and foot deformity.  His protective sensation is intact.  Diagnosis and treatment options discussed with him.  All the nails were reduced bilaterally upon consent.  Continue Diabetic shoes.  Continue Amlactin.  Return to clinic and see me in about 3 months.  Previous notes reviewed.               Moderate level of medical  decision making.       Again, thank you for allowing me to participate in the care of your patient.      Sincerely,    Lamin Gonzalez DPM

## 2022-10-27 NOTE — PROGRESS NOTES
Past Medical History:   Diagnosis Date     Anemia 2013     Arthritis      BPH (benign prostatic hyperplasia)      CAD (coronary artery disease) 4/1/2019     Cholelithiasis      Conductive hearing loss 08/16/2017     Depressive disorder 1986    Suffer effects throughout life     Gastroesophageal reflux disease 12/01/2014     HCC (hepatocellular carcinoma) (H) 1/22/2019     History of diabetic retinopathy 07/2018     HTN (hypertension)      HTN (hypertension) 11/20/2019     Hyperlipidemia      Liver cirrhosis secondary to ESTRADA (H)      Liver transplanted (H) 11/11/2019     Portal vein thrombosis 4/11/2019     Type II diabetes mellitus (H)      Patient Active Problem List   Diagnosis     Bipolar affective disorder in remission (H)     Esophageal varices determined by endoscopy (H)     Brow ptosis     Paralytic lagophthalmos of right upper eyelid     Erectile dysfunction due to diseases classified elsewhere     HCC (hepatocellular carcinoma) (H)     Equivocal stress echocardiogram     Type 2 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, unspecified whether long term insulin use (H)     Status post coronary angiogram     CAD (coronary artery disease)     Liver transplant recipient (H)     Status post liver transplantation (H)     Immunosuppressed status (H)     Benign essential hypertension     Malnutrition related to chronic disease (H)     Hypophosphatasia     Chronic kidney disease, stage 3a (H)     Malnutrition (H)     Anxiety     Mild recurrent major depression (H)     Anemia of chronic renal failure, stage 3a (H)     Rekha (H)     History of coronary artery disease     Dyslipidemia     Constipation, unspecified constipation type     Excessive sweating     Stage 3b chronic kidney disease (H)     Anemia of chronic renal failure, stage 3b (H)     NSTEMI (non-ST elevated myocardial infarction) (H)     Chest pain, unspecified type     Past Surgical History:   Procedure  Laterality Date     BYPASS GRAFT ARTERY CORONARY N/A 7/14/2021    Procedure: median sternotomy, on cardiopulmonary bypass, CORONARY ARTERY BYPASS GRAFT (CABG) x2 with left greater saphenous vein endoscopic harvest and left internal mammery artery harvest;  Surgeon: Tom Zapata MD;  Location: [facility]     COLONOSCOPY      2015     COLONOSCOPY N/A 12/6/2019    Procedure: COLONOSCOPY, WITH POLYPECTOMY AND BIOPSY;  Surgeon: Adam Morton MD;  Location: [facility]     CV CENTRAL VENOUS CATHETER PLACEMENT N/A 7/12/2021    Procedure: Central Venous Catheter Placement;  Surgeon: Fermin Polanco MD;  [facility]     CV CORONARY ANGIOGRAM N/A 7/12/2021    Procedure: Coronary Angiogram;  Surgeon: Fermin Polanco MD;  Location:  [facility]     CV HEART CATHETERIZATION WITH POSSIBLE INTERVENTION N/A 2/26/2019    Procedure: CORS;  Surgeon: Jagdish oHyt MD;  Location:  [facility]     CV INTRA AORTIC BALLOON N/A 7/12/2021    Procedure: Intra Aortic Balloon Pump Insertion;  Surgeon: Fermin Polanco MD;  Location:  [facility]     ESOPHAGOSCOPY, GASTROSCOPY, DUODENOSCOPY (EGD), COMBINED N/A 11/17/2016    Procedure: COMBINED ESOPHAGOSCOPY, GASTROSCOPY, DUODENOSCOPY (EGD);  Surgeon: Santi Rosas MD;  Location: [facility]     ESOPHAGOSCOPY, GASTROSCOPY, DUODENOSCOPY (EGD), COMBINED N/A 11/17/2017    Procedure: COMBINED ESOPHAGOSCOPY, GASTROSCOPY, DUODENOSCOPY (EGD);  EGD;  Surgeon: Santi Rosas MD;  Location: [facility]     ESOPHAGOSCOPY, GASTROSCOPY, DUODENOSCOPY (EGD), COMBINED N/A 12/28/2018    Procedure: EGD;  Surgeon: Santi Rosas MD;  Location:  OR     ESOPHAGOSCOPY, GASTROSCOPY, DUODENOSCOPY (EGD), COMBINED N/A 12/6/2019    Procedure: ESOPHAGOGASTRODUODENOSCOPY, WITH BIOPSY;  Surgeon: Adam Morton MD;  [facility]     ESOPHAGOSCOPY, GASTROSCOPY, DUODENOSCOPY (EGD),  COMBINED N/A 2020    Procedure: ESOPHAGOGASTRODUODENOSCOPY (EGD);  Surgeon: Santi Rosas MD;  Location: [facility]     HEAD & NECK SURGERY      2017 at [facility].      IMPLANT GOLD WEIGHT EYELID Right 2017    Procedure: IMPLANT WEIGHT EYELID;  Right Upper Eyelid Weight, right tarsal strip lower eyelid;  Surgeon: Milana Malave MD;  [facility]: [facility]     IR CHEMO EMBOLIZATION  2019     KNEE SURGERY Left      ORTHOPEDIC SURGERY       PAROTIDECTOMY, RADICAL NECK DISSECTION Right 2017    Procedure: PAROTIDECTOMY, RADICAL NECK DISSECTION;  Right Superfacial Parotidectomy , Facial nerve repair. with [facility] facial nerve monitor.;  Surgeon: Asiya Morgan MD;  Location: [facility]     PICC INSERTION Left 2017    4fr SL BioFlo PICC, 44cm in the L basilic vein w/ tip in the low SVC     RETURN LIVER TRANSPLANT N/A 2019    Procedure: Exploratory laparotomy, hematoma evacuation, abdominal washout;  Surgeon: Александр Ramos MD;  Location: [facility]     TRANSPLANT LIVER RECIPIENT  DONOR N/A 2019    Procedure: TRANSPLANT, LIVER, RECIPIENT,  DONOR;  Surgeon: Александр Ramos MD;  Location: [facility]     VASCULAR SURGERY       Social History     Socioeconomic History     Marital status:      Spouse name: Not on file     Number of children: Not on file     Years of education: Not on file     Highest education level: Bachelor's degree (e.g., BA, AB, BS)   Occupational History     Not on file   Tobacco Use     Smoking status: Former     Packs/day: 6.00     Years: 30.00     Pack years: 180.00     Types: Cigars, Cigarettes     Start date: 2016     Quit date: 10/25/2017     Years since quittin.0     Smokeless tobacco: Former     Types: Chew     Quit date: 10/31/2017     Tobacco comments:     1 tin per week   Substance and Sexual Activity     Alcohol use: No     Alcohol/week: 0.0 standard drinks     Comment: quit 1996     Drug use: No     Sexual activity: Not  Currently     Partners: Female     Birth control/protection: Condom   Other Topics Concern     Parent/sibling w/ CABG, MI or angioplasty before 65F 55M? Yes   Social History Narrative    [place] [facility] [place] [facility]     Social Determinants of Health     Financial Resource Strain: Not on file   Food Insecurity: Not on file   Transportation Needs: Not on file   Physical Activity: Not on file   Stress: Not on file   Social Connections: Not on file   Intimate Partner Violence: Not At Risk     Fear of Current or Ex-Partner: No     Emotionally Abused: No     Physically Abused: No     Sexually Abused: No   Housing Stability: Not on file     Family History   Problem Relation Age of Onset     Prostate Cancer Maternal Grandfather      Substance Abuse Maternal Grandfather         Alcohol     Colon Cancer Father 60     Pancreatic Cancer Father 60     Prostate Cancer Father      Colorectal Cancer Father      Macular Degeneration Father      Cancer Father      Glaucoma Father      Skin Cancer Father      Colorectal Cancer Maternal Grandmother      Cancer Maternal Grandmother      Substance Abuse Maternal Grandmother         Alcohol     Colorectal Cancer Paternal Grandmother      Cancer Mother      Diabetes Mother          3/2016     Cerebrovascular Disease Mother         Passed away in Feb of this year, 80 years old.     Thyroid Disease Mother      Depression Mother      Asthma Sister         Had since birth     Thyroid Disease Sister      Depression Sister      Liver Disease No family hx of      Melanoma No family hx of        Answers for HPI/ROS submitted by the patient on 10/27/2022  General Symptoms: Yes  Skin Symptoms: Yes  HENT Symptoms: No  EYE SYMPTOMS: Yes  HEART SYMPTOMS: No  LUNG SYMPTOMS: No  INTESTINAL SYMPTOMS: No  URINARY SYMPTOMS: No  REPRODUCTIVE SYMPTOMS: No  SKELETAL SYMPTOMS: No  BLOOD SYMPTOMS: No  NERVOUS SYSTEM SYMPTOMS: No  MENTAL HEALTH SYMPTOMS: No  Fever: No  Loss of appetite: No  Weight loss:  No  Weight gain: Yes  Fatigue: No  Night sweats: No  Chills: No  Increased stress: No  Excessive hunger: Yes  Feeling hot or cold when others believe the temperature is normal: No  Loss of height: No  Post-operative complications: No  Surgical site pain: No  Hallucinations: No  Change in or Loss of Energy: No  Hyperactivity: No  Confusion: No  Changes in hair: Yes  Changes in moles/birth marks: No  Itching: No  Rashes: No  Changes in nails: No  Acne: Yes  Change in facial hair: Yes  Warts: No  Non-healing sores: No  Scarring: No  Flaking of skin: No  Color changes of hands/feet in cold : No  Sun sensitivity: No  Skin thickening: No  Eye pain: No  Vision loss: Yes  Dry eyes: Yes  Watery eyes: Yes  Eye bulging: No  Double vision: No  Flashing of lights: Yes  Spots: No  Floaters: No  Redness: No  Crossed eyes: No  Tunnel Vision: No  Yellowing of eyes: No  Eye irritation: Yes    Lab Results   Component Value Date    WBC 4.7 09/07/2022    WBC 4.4 06/28/2021     Lab Results   Component Value Date    RBC 4.08 09/07/2022    RBC 2.35 06/28/2021     Lab Results   Component Value Date    HGB 12.8 09/07/2022    HGB 7.3 06/28/2021     Lab Results   Component Value Date    HCT 40.3 09/07/2022    HCT 22.6 06/28/2021     No components found for: MCT  Lab Results   Component Value Date    MCV 99 09/07/2022    MCV 96 06/28/2021     Lab Results   Component Value Date    MCH 31.4 09/07/2022    MCH 31.1 06/28/2021     Lab Results   Component Value Date    MCHC 31.8 09/07/2022    MCHC 32.3 06/28/2021     Lab Results   Component Value Date    RDW 14.6 09/07/2022    RDW 15.1 06/28/2021     Lab Results   Component Value Date     09/07/2022     06/28/2021     Last Comprehensive Metabolic Panel:  Sodium   Date Value Ref Range Status   09/07/2022 140 136 - 145 mmol/L Final   06/28/2021 137 133 - 144 mmol/L Final     Potassium   Date Value Ref Range Status   09/07/2022 4.7 3.4 - 5.3 mmol/L Final   07/20/2022 4.7 3.4 - 5.3 mmol/L  Final   06/28/2021 4.6 3.4 - 5.3 mmol/L Final     Chloride   Date Value Ref Range Status   09/07/2022 103 98 - 107 mmol/L Final   07/20/2022 105 94 - 109 mmol/L Final   06/28/2021 107 94 - 109 mmol/L Final     Carbon Dioxide   Date Value Ref Range Status   06/28/2021 25 20 - 32 mmol/L Final     Carbon Dioxide (CO2)   Date Value Ref Range Status   09/07/2022 27 22 - 29 mmol/L Final   07/20/2022 26 20 - 32 mmol/L Final     Anion Gap   Date Value Ref Range Status   09/07/2022 10 7 - 15 mmol/L Final   07/20/2022 7 3 - 14 mmol/L Final   06/28/2021 5 3 - 14 mmol/L Final     Glucose   Date Value Ref Range Status   09/07/2022 126 (H) 70 - 99 mg/dL Final   07/20/2022 269 (H) 70 - 99 mg/dL Final   06/28/2021 208 (H) 70 - 99 mg/dL Final     Urea Nitrogen   Date Value Ref Range Status   09/07/2022 32.9 (H) 6.0 - 20.0 mg/dL Final   07/20/2022 32 (H) 7 - 30 mg/dL Final   06/28/2021 33 (H) 7 - 30 mg/dL Final     Creatinine   Date Value Ref Range Status   09/07/2022 1.47 (H) 0.67 - 1.17 mg/dL Final   06/28/2021 1.62 (H) 0.66 - 1.25 mg/dL Final     GFR Estimate   Date Value Ref Range Status   09/07/2022 55 (L) >60 mL/min/1.73m2 Final     Comment:     Effective December 21, 2021 eGFRcr in adults is calculated using the 2021 CKD-EPI creatinine equation which includes age and gender (Allie lira al., NEJM, DOI: 10.1056/JULXgd7500912)   06/28/2021 46 (L) >60 mL/min/[1.73_m2] Final     Comment:     Non  GFR Calc  Starting 12/18/2018, serum creatinine based estimated GFR (eGFR) will be   calculated using the Chronic Kidney Disease Epidemiology Collaboration   (CKD-EPI) equation.       GFR, ESTIMATED POCT   Date Value Ref Range Status   06/08/2022 46 (L) >60 mL/min/1.73m2 Final     Calcium   Date Value Ref Range Status   09/07/2022 10.1 (H) 8.6 - 10.0 mg/dL Final   06/28/2021 9.1 8.5 - 10.1 mg/dL Final     Lab Results   Component Value Date    AST 10 07/13/2022    AST 9 06/28/2021     Lab Results   Component Value Date    ALT  21 07/13/2022    ALT 20 06/28/2021     No results found for: BILICONJ   Lab Results   Component Value Date    BILITOTAL 0.6 07/13/2022    BILITOTAL 0.5 06/28/2021     Lab Results   Component Value Date    ALBUMIN 4.5 09/07/2022    ALBUMIN 4.3 07/20/2022    ALBUMIN 4.0 06/28/2021     Lab Results   Component Value Date    PROTTOTAL 7.8 07/13/2022    PROTTOTAL 7.4 06/28/2021      Lab Results   Component Value Date    ALKPHOS 104 07/13/2022    ALKPHOS 98 06/28/2021         SUBJECTIVE FINDINGS:  A 57-year-old male returns to clinic for foot check.  He relates he is doing okay.  Relates he has numbness, tingling and neuropathy in his toes.  Relates no ulcers or sores since we have seen him last.  Relates he needs his toenails trimmed.  No other specific relieving or aggravating factors.  Relates his Diabetic shoes that he wears are doing well.       OBJECTIVE FINDINGS:  DP and PT 2/4 bilaterally.  He has dorsally contracted digits 2-5 bilaterally.  He has incurvated nails with minimal thickening and dystrophy 1-5 bilaterally to differing degrees.  There is no erythema, no drainage, no odor, no calor bilaterally.  There are no gross tendon voids bilaterally.  He has a laterally deviated hallux bilaterally.       ASSESSMENT/PLAN:  Onychauxis bilaterally.  He is diabetic with peripheral Neuropathy and foot deformity.  His protective sensation is intact.  Diagnosis and treatment options discussed with him.  All the nails were reduced bilaterally upon consent.  Continue Diabetic shoes.  Continue Amlactin.  Return to clinic and see me in about 3 months.  Previous notes reviewed.               Moderate level of medical decision making.

## 2022-10-31 ENCOUNTER — VIRTUAL VISIT (OUTPATIENT)
Dept: ENDOCRINOLOGY | Facility: CLINIC | Age: 57
End: 2022-10-31
Payer: MEDICARE

## 2022-10-31 ENCOUNTER — TELEPHONE (OUTPATIENT)
Dept: ENDOCRINOLOGY | Facility: CLINIC | Age: 57
End: 2022-10-31

## 2022-10-31 DIAGNOSIS — E11.3293 TYPE 2 DIABETES MELLITUS WITH MILD NONPROLIFERATIVE RETINOPATHY OF BOTH EYES WITHOUT MACULAR EDEMA, UNSPECIFIED WHETHER LONG TERM INSULIN USE (H): ICD-10-CM

## 2022-10-31 DIAGNOSIS — Z95.1 S/P CABG (CORONARY ARTERY BYPASS GRAFT): ICD-10-CM

## 2022-10-31 DIAGNOSIS — I50.20 HFREF (HEART FAILURE WITH REDUCED EJECTION FRACTION) (H): ICD-10-CM

## 2022-10-31 DIAGNOSIS — Z79.4 TYPE 2 DIABETES MELLITUS WITHOUT COMPLICATION, WITH LONG-TERM CURRENT USE OF INSULIN (H): ICD-10-CM

## 2022-10-31 DIAGNOSIS — E11.65 TYPE 2 DIABETES MELLITUS WITH HYPERGLYCEMIA, WITH LONG-TERM CURRENT USE OF INSULIN (H): Primary | ICD-10-CM

## 2022-10-31 DIAGNOSIS — R80.1 PERSISTENT PROTEINURIA: ICD-10-CM

## 2022-10-31 DIAGNOSIS — N18.32 STAGE 3B CHRONIC KIDNEY DISEASE (H): ICD-10-CM

## 2022-10-31 DIAGNOSIS — Z79.4 TYPE 2 DIABETES MELLITUS WITH HYPERGLYCEMIA, WITH LONG-TERM CURRENT USE OF INSULIN (H): Primary | ICD-10-CM

## 2022-10-31 DIAGNOSIS — E11.9 TYPE 2 DIABETES MELLITUS WITHOUT COMPLICATION, WITH LONG-TERM CURRENT USE OF INSULIN (H): ICD-10-CM

## 2022-10-31 PROCEDURE — 99214 OFFICE O/P EST MOD 30 MIN: CPT | Mod: 95 | Performed by: PHYSICIAN ASSISTANT

## 2022-10-31 RX ORDER — PEN NEEDLE, DIABETIC 32GX 5/32"
NEEDLE, DISPOSABLE MISCELLANEOUS
Qty: 300 EACH | Refills: 3 | Status: SHIPPED | OUTPATIENT
Start: 2022-10-31 | End: 2024-02-01

## 2022-10-31 RX ORDER — INSULIN DEGLUDEC 100 U/ML
INJECTION, SOLUTION SUBCUTANEOUS
Qty: 25 ML | Refills: 3 | Status: SHIPPED | OUTPATIENT
Start: 2022-10-31 | End: 2023-01-18

## 2022-10-31 NOTE — LETTER
10/31/2022       RE: Frandy Workman  [address] [facility] [address]     Dear Colleague,    Thank you for referring your patient, Frandy Workman, to the [facility]. Please see a copy of my visit note below.    Frandy Workman  is being evaluated via a billable video visit.      How would you like to obtain your AVS? [employer]  For the video visit, send the invitation by: Text to cell phone: [phone]  Will anyone else be joining your video visit? No          **needs refills today    Outcome for 10/24/22 9:33 AM: P2P-Next message sent  VIKY Oneill  Outcome for 10/24/22 1:46 PM: Patient advises he has not worn benjamin sensor since october 14th. Benjamin data on site is up to 10/14/22. Patient has been checking manually with meter since.   VIKY Oneill  Outcome for 10/24/22 1:48 PM: Replied to P2P-Next message  VIKY Oneill  Outcome for 10/27/22 10:15 AM: Glucose Readings sent via P2P-Next  VIKY Oneill     Start time: 10:38am  End time: 10:52am  Provider location- off site- home.   Patient location- off site- home.       HPI:     Frandy Workman is a 57 year old man here for follow up of type 2 diabetes complicated by nephropathy, retinopathy and neuropathy. 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  He usually sees Tish Toscano.  Today is the first time I am meeting him.  He reports that he has been having high glucose readings recently and he is not sure why.   He has been feeling more tired and not that hungry.  Not feeling himself.  He feels like he has been sleeping too much during the day.  No testing for COVID.  He has had no cough or SOB.  Otherwise feeling wll.      Dm Regimen:   - Tresiba 26 units /day.    - Carbohydrate coverage to 1 unit : 20 g of carbohydrate.   - Correction Novolounit Novolo mg /dl >180     - Empagliflozin 10 mg daily (had JERONIMO/hypovolemias on higher dose).    He has been averaging 180 mg/dL.      Recent glucose:         Diabetes Control:   Lab Results   Component Value Date    A1C 6.0 01/10/2022    A1C 6.8 2021    A1C 6.0 2020    A1C 6.3 2020    A1C 6.6 2018    A1C 6.5 2017    A1C 7.8 10/25/2016       Past Medical History:   Diagnosis Date     Anemia      Arthritis      BPH (benign prostatic hyperplasia)      CAD (coronary artery disease) 2019     Cholelithiasis      Conductive hearing loss 2017     Depressive disorder 1986    Suffer effects throughout life     Gastroesophageal reflux disease 2014     HCC (hepatocellular carcinoma) (H) 2019     History of diabetic retinopathy 2018     HTN (hypertension)      HTN (hypertension) 2019     Hyperlipidemia      Liver cirrhosis secondary to ESTRADA (H)      Liver transplanted (H) 2019     Portal vein thrombosis 2019     Type II diabetes mellitus (H)        Past Surgical History:   Procedure Laterality Date     BYPASS GRAFT ARTERY CORONARY N/A 2021    Procedure: median sternotomy, on cardiopulmonary bypass, CORONARY ARTERY BYPASS GRAFT (CABG) x2 with left greater saphenous vein endoscopic harvest and left internal mammery artery harvest;  Surgeon: Tom Zapata MD;  [facility]     COLONOSCOPY           COLONOSCOPY N/A 2019    Procedure: COLONOSCOPY, WITH POLYPECTOMY AND BIOPSY;  Surgeon: Adam Morton MD;  Location:  GI     CV CENTRAL VENOUS CATHETER PLACEMENT N/A 2021    Procedure: Central Venous Catheter Placement;  Surgeon: Fermin Polanco MD;  Location: [facility] CARDIAC CATH LAB     CV CORONARY ANGIOGRAM N/A 2021    Procedure: Coronary Angiogram;  Surgeon: Fermin Polanco MD;  Location: [facility]  CARDIAC CATH LAB     CV HEART CATHETERIZATION WITH POSSIBLE INTERVENTION N/A 2/26/2019    Procedure: CORS;  Surgeon: Jagdish Hoyt MD;  Location:  HEART CARDIAC CATH LAB     CV INTRA AORTIC BALLOON N/A 7/12/2021    Procedure: Intra Aortic Balloon Pump Insertion;  Surgeon: Fermin Polanco MD;  Location:  HEART CARDIAC CATH LAB     ESOPHAGOSCOPY, GASTROSCOPY, DUODENOSCOPY (EGD), COMBINED N/A 11/17/2016    Procedure: COMBINED ESOPHAGOSCOPY, GASTROSCOPY, DUODENOSCOPY (EGD);  Surgeon: Santi Rosas MD;  Location:  GI     ESOPHAGOSCOPY, GASTROSCOPY, DUODENOSCOPY (EGD), COMBINED N/A 11/17/2017    Procedure: COMBINED ESOPHAGOSCOPY, GASTROSCOPY, DUODENOSCOPY (EGD);  EGD;  Surgeon: Santi Rosas MD;  Location:  GI     ESOPHAGOSCOPY, GASTROSCOPY, DUODENOSCOPY (EGD), COMBINED N/A 12/28/2018    Procedure: EGD;  Surgeon: Santi Rosas MD;  Location:  OR     ESOPHAGOSCOPY, GASTROSCOPY, DUODENOSCOPY (EGD), COMBINED N/A 12/6/2019    Procedure: ESOPHAGOGASTRODUODENOSCOPY, WITH BIOPSY;  Surgeon: Adam Morton MD;  Location:  GI     ESOPHAGOSCOPY, GASTROSCOPY, DUODENOSCOPY (EGD), COMBINED N/A 2/13/2020    Procedure: ESOPHAGOGASTRODUODENOSCOPY (EGD);  Surgeon: Santi Rosas MD;  Location:  GI     HEAD & NECK SURGERY      12/2017 at [facility].      IMPLANT GOLD WEIGHT EYELID Right 11/16/2017    Procedure: IMPLANT WEIGHT EYELID;  Right Upper Eyelid Weight, right tarsal strip lower eyelid;  Surgeon: Milana Malave MD;  [facility]     IR CHEMO EMBOLIZATION  1/22/2019     KNEE SURGERY Left      ORTHOPEDIC SURGERY       PAROTIDECTOMY, RADICAL NECK DISSECTION Right 11/2/2017    Procedure: PAROTIDECTOMY, RADICAL NECK DISSECTION;  Right Superfacial Parotidectomy , Facial nerve repair. with [facility] facial nerve monitor.;  Surgeon: Asiya Morgan MD;  [facility]     PICC INSERTION Left 11/06/2017    4fr SL BioFlo PICC, 44cm in the L basilic vein w/ tip in  the low SVC     RETURN LIVER TRANSPLANT N/A 2019    Procedure: Exploratory laparotomy, hematoma evacuation, abdominal washout;  Surgeon: Александр Ramos MD;  [facility]     TRANSPLANT LIVER RECIPIENT  DONOR N/A 2019    Procedure: TRANSPLANT, LIVER, RECIPIENT,  DONOR;  Surgeon: Александр Ramos MD;  [facility]     VASCULAR SURGERY         Family History   Problem Relation Age of Onset     Prostate Cancer Maternal Grandfather      Substance Abuse Maternal Grandfather         Alcohol     Colon Cancer Father 60     Pancreatic Cancer Father 60     Prostate Cancer Father      Colorectal Cancer Father      Macular Degeneration Father      Cancer Father      Glaucoma Father      Skin Cancer Father      Colorectal Cancer Maternal Grandmother      Cancer Maternal Grandmother      Substance Abuse Maternal Grandmother         Alcohol     Colorectal Cancer Paternal Grandmother      Cancer Mother      Diabetes Mother          3/2016     Cerebrovascular Disease Mother         Passed away in Feb of this year, 80 years old.     Thyroid Disease Mother      Depression Mother      Asthma Sister         Had since birth     Thyroid Disease Sister      Depression Sister      Liver Disease No family hx of      Melanoma No family hx of        Social History     Socioeconomic History     Marital status:      Highest education level: Bachelor's degree (e.g., BA, AB, BS)   Tobacco Use     Smoking status: Former     Packs/day: 6.00     Years: 30.00     Pack years: 180.00     Types: Cigars, Cigarettes     Start date: 2016     Quit date: 10/25/2017     Years since quittin.0     Smokeless tobacco: Former     Types: Chew     Quit date: 10/31/2017     Tobacco comments:     1 tin per week   Substance and Sexual Activity     Alcohol use: No     Alcohol/week: 0.0 standard drinks     Comment: quit 1996     Drug use: No     Sexual activity: Not Currently     Partners: Female     Birth  control/protection: Condom   Other Topics Concern     Parent/sibling w/ CABG, MI or angioplasty before 65F 55M? Yes   Social History Narrative    Prior [facility], [facility]     Social Determinants of Health     Intimate Partner Violence: Not At Risk     Fear of Current or Ex-Partner: No     Emotionally Abused: No     Physically Abused: No     Sexually Abused: No       Current Outpatient Medications   Medication     pantoprazole (PROTONIX) 40 MG EC tablet     Acetaminophen (TYLENOL) 325 MG CAPS     ammonium lactate (AMLACTIN) 12 % external cream     ARIPiprazole (ABILIFY) 5 MG tablet     aspirin (SM ASPIRIN ADULT LOW STRENGTH) 81 MG EC tablet     BD VIKTORIA U/F 32G X 4 MM insulin pen needle     ciclopirox (LOPROX) 0.77 % cream     Continuous Blood Gluc  (FREESTYLE CLAUDIA 14 DAY READER) CECILIO     Continuous Blood Gluc Sensor (FREESTYLE CLAUDIA 2 SENSOR) MISC     cyanocobalamin (VITAMIN B-12) 1000 MCG tablet     insulin aspart (NOVOLOG PEN) 100 UNIT/ML pen     insulin degludec (TRESIBA FLEXTOUCH) 100 UNIT/ML pen     JARDIANCE 10 MG TABS tablet     lamiVUDine (EPIVIR) 100 MG tablet     lisinopril (ZESTRIL) 5 MG tablet     methocarbamol (ROBAXIN) 750 MG tablet     metoprolol succinate ER (TOPROL XL) 25 MG 24 hr tablet     Multiple Vitamin (TAB-A-GLADIS) TABS     mycophenolate (GENERIC EQUIVALENT) 250 MG capsule     order for DME     polyethylene glycol (MIRALAX) 17 g packet     predniSONE (DELTASONE) 5 MG tablet     rosuvastatin (CRESTOR) 5 MG tablet     senna-docusate (SENOKOT-S/PERICOLACE) 8.6-50 MG tablet     tamsulosin (FLOMAX) 0.4 MG capsule     vitamin D3 (VITAMIN D3) 50 mcg (2000 units) tablet     Current Facility-Administered Medications   Medication     aflibercept (EYLEA) injection prefilled syringe 2 mg     aflibercept (EYLEA) injection prefilled syringe 2 mg          Allergies   Allergen Reactions     Codeine Other (See Comments)     Cannot take due to liver  Cannot tolerate oral narcotics     Seasonal  Allergies      Sneezing, coughing, runny and itchy eyes       Physical Exam  There were no vitals taken for this visit.  GENERAL: healthy, alert and no distress  RESP: no audible wheeze, cough, or visible cyanosis.  No visible retractions or increased work of breathing.  Able to speak fully in complete sentences.  PSYCH: mentation appears normal, affect normal/bright, judgement and insight intact, normal speech and appearance well-groomed    RESULTS  Lab Results   Component Value Date    A1C 6.0 (H) 01/10/2022    A1C 6.8 (H) 07/13/2021    A1C 6.0 (H) 12/30/2020    A1C 6.3 (H) 06/13/2020    A1C 6.6 (H) 08/08/2018    A1C 6.5 (H) 06/09/2017    A1C 7.8 (H) 10/25/2016    HEMOGLOBINA1 4.8 03/04/2020    HEMOGLOBINA1 5.1 12/02/2019    HEMOGLOBINA1 5.4 08/14/2019    HEMOGLOBINA1 6.1 (A) 02/11/2019    HEMOGLOBINA1 8.1 (A) 04/11/2018       TSH   Date Value Ref Range Status   04/25/2022 1.24 0.40 - 4.00 mU/L Final   10/04/2021 1.90 0.40 - 4.00 mU/L Final   01/28/2021 0.91 0.40 - 4.00 mU/L Final   01/24/2020 1.91 0.40 - 4.00 mU/L Final   08/08/2018 0.49 0.40 - 4.00 mU/L Final   02/08/2018 0.71 0.40 - 4.00 mU/L Final   06/09/2017 0.42 0.40 - 4.00 mU/L Final     T4 Free   Date Value Ref Range Status   08/08/2018 1.21 0.76 - 1.46 ng/dL Final       ALT   Date Value Ref Range Status   07/13/2022 21 0 - 70 U/L Final   06/08/2022 26 0 - 70 U/L Final   06/28/2021 20 0 - 70 U/L Final   06/14/2021 21 0 - 70 U/L Final   ]    Recent Labs   Lab Test 09/07/21  1025 09/25/20  0859   CHOL 176 146   HDL 34* 39*   LDL 95 61   TRIG 233* 228*       Lab Results   Component Value Date     09/07/2022     06/28/2021      Lab Results   Component Value Date    POTASSIUM 4.7 09/07/2022    POTASSIUM 4.7 07/20/2022    POTASSIUM 4.6 06/28/2021     Lab Results   Component Value Date    CHLORIDE 103 09/07/2022    CHLORIDE 105 07/20/2022    CHLORIDE 107 06/28/2021     Lab Results   Component Value Date    DEBORAH 10.1 09/07/2022    DEBORAH 9.1 06/28/2021      Lab Results   Component Value Date    CO2 27 09/07/2022    CO2 26 07/20/2022    CO2 25 06/28/2021     Lab Results   Component Value Date    BUN 32.9 09/07/2022    BUN 32 07/20/2022    BUN 33 06/28/2021     Lab Results   Component Value Date    CR 1.47 09/07/2022    CR 1.62 06/28/2021       GFR Estimate   Date Value Ref Range Status   09/07/2022 55 (L) >60 mL/min/1.73m2 Final     Comment:     Effective December 21, 2021 eGFRcr in adults is calculated using the 2021 CKD-EPI creatinine equation which includes age and gender (Allie et al., NE, DOI: 10.1056/SCMCxu5235725)   07/20/2022 42 (L) >60 mL/min/1.73m2 Final     Comment:     Effective December 21, 2021 eGFRcr in adults is calculated using the 2021 CKD-EPI creatinine equation which includes age and gender (Allie et al., NE, DOI: 10.1056/ODVNwa8259339)   07/13/2022 42 (L) >60 mL/min/1.73m2 Final     Comment:     Effective December 21, 2021 eGFRcr in adults is calculated using the 2021 CKD-EPI creatinine equation which includes age and gender (Allie et al., NE, DOI: 10.1056/WLPIym1370302)   06/28/2021 46 (L) >60 mL/min/[1.73_m2] Final     Comment:     Non  GFR Calc  Starting 12/18/2018, serum creatinine based estimated GFR (eGFR) will be   calculated using the Chronic Kidney Disease Epidemiology Collaboration   (CKD-EPI) equation.     06/14/2021 49 (L) >60 mL/min/[1.73_m2] Final     Comment:     Non  GFR Calc  Starting 12/18/2018, serum creatinine based estimated GFR (eGFR) will be   calculated using the Chronic Kidney Disease Epidemiology Collaboration   (CKD-EPI) equation.     06/04/2021 46 (L) >60 mL/min/[1.73_m2] Final     Comment:     Non  GFR Calc  Starting 12/18/2018, serum creatinine based estimated GFR (eGFR) will be   calculated using the Chronic Kidney Disease Epidemiology Collaboration   (CKD-EPI) equation.       GFR, ESTIMATED POCT   Date Value Ref Range Status   06/08/2022 46 (L) >60  mL/min/1.73m2 Final   11/26/2021 47 (L) >60 mL/min/1.73m2 Final   09/24/2021 55 (L) >60 mL/min/1.73m2 Final     GFR Estimate If Black   Date Value Ref Range Status   06/28/2021 54 (L) >60 mL/min/[1.73_m2] Final     Comment:      GFR Calc  Starting 12/18/2018, serum creatinine based estimated GFR (eGFR) will be   calculated using the Chronic Kidney Disease Epidemiology Collaboration   (CKD-EPI) equation.     06/14/2021 57 (L) >60 mL/min/[1.73_m2] Final     Comment:      GFR Calc  Starting 12/18/2018, serum creatinine based estimated GFR (eGFR) will be   calculated using the Chronic Kidney Disease Epidemiology Collaboration   (CKD-EPI) equation.     06/04/2021 53 (L) >60 mL/min/[1.73_m2] Final     Comment:      GFR Calc  Starting 12/18/2018, serum creatinine based estimated GFR (eGFR) will be   calculated using the Chronic Kidney Disease Epidemiology Collaboration   (CKD-EPI) equation.         Lab Results   Component Value Date    MICROL 298.0 09/07/2022    MICROL 47 04/20/2022    MICROL 563 02/26/2021     No results found for: MICROALBUMIN  No results found for: CPEPT, GADAB, ISCAB    Vitamin B12   Date Value Ref Range Status   01/11/2022 2,179 (H) 193 - 986 pg/mL Final   06/13/2020 682 193 - 986 pg/mL Final   02/04/2019 3,006 (H) 193 - 986 pg/mL Final   ]    Most recent eye exam date: : Not Found     Assessment/Plan:     1.  Type 2 diabetes- Doing ok, but recent glucose has been running higher.  Would likely benefit from higher dose SGLT_2 inhibitor in the future, but he did have JERONIMO with illness/dehydration in the past.  Reminded him to stop jardiance during times of poor po intake and reminded him to stay well hydrated. We made the following plan today (instructions given to patient):   Increase Tresiba to 28 units daily.      Increase mealtime insulin to 1/15g carb.      Stop Jardiance during times of illness or dehydration.     Please let me know if you are having  low blood sugars less than 70 or over 300 mg/dL.      If you have concerns, please send me a [employer] message M-Th, call the clinic at [phone], or call [phone] after hours/weekends and ask to speak with the endocrinologist on call.      2.  Risk factors-     Retinopathy:  Yes.  Had eye exam within last year.   Nephropathy:  BP historically well controlled.+ albuminuria. On SGLT-2 inhibitor and lisinopril.  Creatinine stable. GFR about 50.    Neuropathy: Yes.   Feet: OK, no ulcers. Sees podiatry.   Lipids:  LDL at target.  Patient taking statin.  Will check lipids.     3.  F/U in 3 months with Tish Toscano or myself, sooner with concerns.     38 minutes spent on the date of the encounter doing chart review, review of test results, review and interpretation of glucose data, patient visit and documentation, counseling/coordination of care, and discussion of follow up plan for worsening hyper and hypoglycemia.  The patient understood and is satisfied with today's visit.          Frannie Vasquez PA-C, MPAS   [facility]  Department of Medicine  Division of Endocrinology and Diabetes

## 2022-10-31 NOTE — PATIENT INSTRUCTIONS
Increase Tresiba to 28 units daily.      Increase mealtime insulin to 1/15g carb.      Stop Jardiance during times of illness or dehydration.     Please let me know if you are having low blood sugars less than 70 or over 300 mg/dL.      If you have concerns, please send me a [employer] message M-Th, call the clinic at [phone], or call [phone] after hours/weekends and ask to speak with the endocrinologist on call.

## 2022-11-02 ENCOUNTER — OFFICE VISIT (OUTPATIENT)
Dept: OPHTHALMOLOGY | Facility: CLINIC | Age: 57
End: 2022-11-02
Attending: OPHTHALMOLOGY
Payer: MEDICARE

## 2022-11-02 DIAGNOSIS — E11.3313 TYPE 2 DIABETES MELLITUS WITH MODERATE NONPROLIFERATIVE RETINOPATHY OF BOTH EYES AND MACULAR EDEMA, UNSPECIFIED WHETHER LONG TERM INSULIN USE (H): ICD-10-CM

## 2022-11-02 PROCEDURE — 99213 OFFICE O/P EST LOW 20 MIN: CPT | Mod: 25 | Performed by: OPHTHALMOLOGY

## 2022-11-02 PROCEDURE — 67228 TREATMENT X10SV RETINOPATHY: CPT | Mod: RT | Performed by: OPHTHALMOLOGY

## 2022-11-02 PROCEDURE — G0463 HOSPITAL OUTPT CLINIC VISIT: HCPCS | Mod: 25

## 2022-11-02 PROCEDURE — 92134 CPTRZ OPH DX IMG PST SGM RTA: CPT | Performed by: OPHTHALMOLOGY

## 2022-11-02 ASSESSMENT — REFRACTION_WEARINGRX
OD_SPHERE: -7.00
OD_AXIS: 131
OS_SPHERE: -6.75
OS_ADD: +2.00
SPECS_TYPE: PAL
OD_ADD: +2.00
OD_CYLINDER: +0.75
OS_CYLINDER: +0.50
OS_AXIS: 044

## 2022-11-02 ASSESSMENT — SLIT LAMP EXAM - LIDS
COMMENTS: NORMAL
COMMENTS: SMALL PAPILLOMA ALONG LL MARGIN, NON CONCERNING

## 2022-11-02 ASSESSMENT — CONF VISUAL FIELD
OS_SUPERIOR_NASAL_RESTRICTION: 0
OD_INFERIOR_TEMPORAL_RESTRICTION: 0
OD_SUPERIOR_NASAL_RESTRICTION: 0
METHOD: COUNTING FINGERS
OS_NORMAL: 1
OS_SUPERIOR_TEMPORAL_RESTRICTION: 0
OD_NORMAL: 1
OS_INFERIOR_TEMPORAL_RESTRICTION: 0
OD_INFERIOR_NASAL_RESTRICTION: 0
OD_SUPERIOR_TEMPORAL_RESTRICTION: 0
OS_INFERIOR_NASAL_RESTRICTION: 0

## 2022-11-02 ASSESSMENT — VISUAL ACUITY
OD_CC: 20/25
CORRECTION_TYPE: GLASSES
OS_CC: 20/30
METHOD: SNELLEN - LINEAR
OD_CC+: -2

## 2022-11-02 ASSESSMENT — TONOMETRY
IOP_METHOD: TONOPEN
OD_IOP_MMHG: 17
OS_IOP_MMHG: 16

## 2022-11-02 ASSESSMENT — EXTERNAL EXAM - RIGHT EYE: OD_EXAM: NORMAL

## 2022-11-02 ASSESSMENT — CUP TO DISC RATIO
OS_RATIO: 0.3
OD_RATIO: 0.2

## 2022-11-02 ASSESSMENT — EXTERNAL EXAM - LEFT EYE: OS_EXAM: NORMAL

## 2022-11-02 NOTE — PROGRESS NOTES
CC:  Follow up Diabetic macular edema and diabetic retinopathy     HPI: Frandy Workman is a 57 year old patient with history of Diabetes mellitus for 24 ys . Patient on insulin.      Interval History: Eyes feel better. Not having the floaters that he used to in the left eye,     Retinal Imaging:  OCT 11/02/22   RE: mild central Diabetic macular edema, relatively stable, VMA present, normal foveal contour,   LE: improved Diabetic macular edema, mild Epiretinal membrane, normal foveal contour;     fluorescein angiography 09/28/22  limited by oral dye. No obvious NVE, but peripheral leakage and capillary non perfusion; neovascularization elsewhere left eye probably not seen because of  Vitreous hemorrhage and oral fluorescein    Optos consistent with clinical exam    Assessment & Plan:    # new vitreous hemorrhage left eye 10/12/22   Status post  Eylea inj 10.12.22  last Panretinal laser photocoagulation (PRP) 9.28.22  Will likely need Panretinal laser photocoagulation (PRP) filling in the future left eye   Continue Eylea inj q 4-5 weeks    # Diabetic macular edema both eyes   - status post avastin x 4. Switch to Eylea 4/24/19 with improvement of Diabetic macular edema   - mild DME each eye today (right eye same, left eye worse)   - Last Eylea injection right eye was 5/2021, left eye 10/12/22     # Proliferative diabetic retinopathy left eye   - s/p PRP left eye 9/28/22 (#437)  # pre-proliferative diabetic retinopathy right eye    - Given VA 20/25 stable, stable DME    Plan for scatter PRP right eye 11/02/22     #T2DM   - HbA1c 6.0% (1/2022)  - Blood pressure (<120/80) and blood glucose (HbA1c <7.0, ~6.5 today) control discussed with patient. Patient advised that failure to adequately control each may lead to vision loss. The patient expressed understanding.    #. Senile nuclear sclerosis, bilateral   visually significant both eyes    Glare Testing (BAT)     Off Low Medium High   Right 20/25 20/25 20/30 20/40   Left  20/25 20/40 20/50 20/60+1   Edited by: Elin Rodas CO     Visually significant:YES  Glare: Positive   Reports impacts ADL   Dilation: 5 mm  Iris expansion: yes; likely  Pseudoexfoliation: NO  Trypan Blue: yes  Phacodonesis: No  Cornea guttae: rare  Aim for:  Start artificial tears twice a day and as needed    Anesthesia: peribulbar block  Surgical time: 30 min  Candidate for toric IOL:   Anticoagulants: NO  Alpha blockers/Flomax: YES Patient on flomax   I reviewed the indications, risks, benefits, and alternatives of the proposed surgical procedure. We discussed at length cataracts and the effect of the cataracts on vision.   We discussed the fact that modern cataract surgery is usually successful at alleviating symptoms of glare when the cataract is the causative factor. Other risks were discussed with patient including, but not limited to, failure to improve vision or further loss of vision,  and need for additional surgery, bleeding, infection, loss of vision and the remote possibility of complications of anesthesia. 1:1000 risk of infection/bleed/loss of eye; 1:100 risk of RD and need for further surgery. Patient agreed to proceed with surgery.  I provided multiple opportunities for the questions, answered all questions to the best of my ability, and confirmed that my answers and my discussion were understood.     # Dry eye syndrome   Left eye worse than right eye   warm compresses and artificial tears  As needed  -punctal plugs previously left eye - reports this is better     # Status post liver transplant   - tx 11/2019   - severe anemia; s/p transfusion - anemia much improved    - being seen by his primary team    PLAN:  Panretinal laser photocoagulation (PRP) right eye 11/02/22   Follow up  2-3 weeks Eylea left eye     Retina detachment precautions were discussed with the patient (presence or increased in flashes, floaters or a curtain in the visual field) and was asked to return if any of the those  occur     Layton Shanks MD  Vitreoretinal Surgical Fellow, PGY6  [facility]    ~~~~~~~~~~~~~~~~~~~~~~~~~~~~~~~~~~   Complete documentation of historical and exam elements from today's encounter can be found in the full encounter summary report (not reduplicated in this progress note).  I personally obtained the chief complaint(s) and history of present illness.  I confirmed and edited as necessary the review of systems, past medical/surgical history, family history, social history, and examination findings as documented by others; and I examined the patient myself.  I personally reviewed the relevant tests, images, and reports as documented above.  I personally reviewed the ophthalmic test(s) associated with this encounter, agree with the interpretation(s) as documented by the resident/fellow, and have edited the corresponding report(s) as necessary.   I formulated and edited as necessary the assessment and plan and discussed the findings and management plan with the patient and family and I was present for critical portions of the procedure carried out by the resident/fellow and immediately available for the entire procedure    Enriqueta Martin MD   of Ophthalmology.  Retina Service   Department of Ophthalmology and Visual Neurosciences   [facility]  Phone: [phone]   Fax: [phone]

## 2022-11-07 ENCOUNTER — VIRTUAL VISIT (OUTPATIENT)
Dept: BEHAVIORAL HEALTH | Facility: CLINIC | Age: 57
End: 2022-11-07
Payer: MEDICARE

## 2022-11-07 DIAGNOSIS — F31.31 BIPOLAR 1 DISORDER, DEPRESSED, MILD (H): Primary | ICD-10-CM

## 2022-11-07 PROCEDURE — 90832 PSYTX W PT 30 MINUTES: CPT | Mod: 95 | Performed by: PSYCHOLOGIST

## 2022-11-07 NOTE — PROGRESS NOTES
[facility] Integrated Behavioral Health  November 7, 2022      Behavioral Health Clinician Progress Note    Patient Name: Frandy Workman           Service Type: Video visit      Service Location:  Video visit      Session Start Time: 1:05  Session End Time:  1:21      Session Length: 16 - 37      Attendees: Patient    Visit Activities (Refresh list every visit): [facility] Only     Service Modality:  Video Visit:      Provider verified identity through the following two step process.  Patient provided:  Patient photo and Patient is known previously to provider    Telemedicine Visit: The patient's condition can be safely assessed and treated via synchronous audio and visual telemedicine encounter.      Reason for Telemedicine Visit: Services only offered telehealth    Originating Site (Patient Location): Patient's home    Distant Site (Provider Location): Provider Remote Setting- Home Office    Consent:  The patient/guardian has verbally consented to: the potential risks and benefits of telemedicine (video visit) versus in person care; bill my insurance or make self-payment for services provided; and responsibility for payment of non-covered services.     Patient would like the video invitation sent by:  My Chart    Mode of Communication:  Video Conference via [facility]well    Distant Location (Provider):  Off-site    As the provider I attest to compliance with applicable laws and regulations related to telemedicine.      Diagnostic Assessment Date:  12/03/2020  Treatment Plan Review Date:  12/29/2022  See Flowsheets for today's PHQ-9 and LIZZETTE-7 results  Previous PHQ-9:   PHQ-9 SCORE 7/12/2022 9/8/2022 10/19/2022   PHQ-9 Total Score MyChart 4 (Minimal depression) 3 (Minimal depression) 2 (Minimal depression)   PHQ-9 Total Score 4 3 2     Previous LIZZETTE-7:   LIZZETTE-7 SCORE 12/29/2020 4/7/2021 9/30/2021   Total Score 8 (mild anxiety) 9 (mild anxiety) -   Total Score 8 9 10       Extended Session (60+  minutes): No  Interactive Complexity: No  Crisis: No    Treatment Objective(s) Addressed in This Session:  Target Behavior(s): disease management/lifestyle changes  related to adaptive approaches to managing anxious distress and mood difficulties    Relationship concerns/Stress management  Grief management/loss     Current Stressors / Issues:  [facility] met with Miller today for a follow-up. Discussed experience with his father's , how he is grappling with a sense of loss with connecting with family and his daughter again. States he is finding connecting with his daughter again unlikely and is working through how moving forward in life with this. Explored focusing on what he can control, like self-care. Discussed difference between  Moving on and moving forward to these ends, exploring ideas related to loss/validating his feelings of sadness about the situation. Discussed framing moving forward with a theme of certainty, which he could find peace in. Does report feeling fortunate his friend came to visit from [place] and offered support for him as well.    Answers for HPI/ROS submitted by the patient on 2022  If you checked off any problems, how difficult have these problems made it for you to do your work, take care of things at home, or get along with other people?: Somewhat difficult  PHQ9 TOTAL SCORE: 4    Answers for HPI/ROS submitted by the patient on 2022  If you checked off any problems, how difficult have these problems made it for you to do your work, take care of things at home, or get along with other people?: Somewhat difficult  PHQ9 TOTAL SCORE: 3        Progress on Treatment Objective(s) / Homework:  Stable - MAINTENANCE (Working to maintain change, with risk of relapse); Intervened by continuing to positively reinforce healthy behavior choice       Solution-Focused Therapy    Explore patterns in patient's relationships and discuss options for new behaviors.    Explore patterns in patient's  actions and choices and discuss options for new behaviors.    Supportive Psychotherapy    ACT ideas    Answers for HPI/ROS submitted by the patient on 3/21/2022  If you checked off any problems, how difficult have these problems made it for you to do your work, take care of things at home, or get along with other people?: Not difficult at all  PHQ9 TOTAL SCORE: 6      Answers for HPI/ROS submitted by the patient on 2/8/2022  If you checked off any problems, how difficult have these problems made it for you to do your work, take care of things at home, or get along with other people?: Somewhat difficult  PHQ9 TOTAL SCORE: 4      Answers for HPI/ROS submitted by the patient on 4/7/2021   LIZZETTE 7 TOTAL SCORE: 9  If you checked off any problems, how difficult have these problems made it for you to do your work, take care of things at home, or get along with other people?: Very difficult  PHQ9 TOTAL SCORE: 7*    *No SI    Answers for HPI/ROS submitted by the patient on 10/13/2020   If you checked off any problems, how difficult have these problems made it for you to do your work, take care of things at home, or get along with other people?: Somewhat difficult  PHQ9 TOTAL SCORE: 8*  LIZZETTE 7 TOTAL SCORE: 6      *No SI     Answers for HPI/ROS submitted by the patient on 12/29/2020   If you checked off any problems, how difficult have these problems made it for you to do your work, take care of things at home, or get along with other people?: Somewhat difficult  PHQ9 TOTAL SCORE: 5*  LIZZETTE 7 TOTAL SCORE: 8    *No SI       Care Plan review completed: yes    Medication Review:  No changes to current psychiatric medication(s)     Current Outpatient Medications   Medication     Acetaminophen (TYLENOL) 325 MG CAPS     ammonium lactate (AMLACTIN) 12 % external cream     ARIPiprazole (ABILIFY) 5 MG tablet     aspirin (SM ASPIRIN ADULT LOW STRENGTH) 81 MG EC tablet     BD VIKTORIA U/F 32G X 4 MM insulin pen needle     ciclopirox (LOPROX) 0.77  % cream     Continuous Blood Gluc  (FREESTYLE CLAUDIA 14 DAY READER) CECILIO     Continuous Blood Gluc Sensor (FREESTYLE CLAUDIA 2 SENSOR) MISC     cyanocobalamin (VITAMIN B-12) 1000 MCG tablet     empagliflozin (JARDIANCE) 10 MG TABS tablet     insulin aspart (NOVOLOG PEN) 100 UNIT/ML pen     insulin degludec (TRESIBA FLEXTOUCH) 100 UNIT/ML pen     lamiVUDine (EPIVIR) 100 MG tablet     lisinopril (ZESTRIL) 5 MG tablet     methocarbamol (ROBAXIN) 750 MG tablet     metoprolol succinate ER (TOPROL XL) 25 MG 24 hr tablet     Multiple Vitamin (TAB-A-GLADIS) TABS     mycophenolate (GENERIC EQUIVALENT) 250 MG capsule     order for DME     pantoprazole (PROTONIX) 40 MG EC tablet     polyethylene glycol (MIRALAX) 17 g packet     predniSONE (DELTASONE) 5 MG tablet     rosuvastatin (CRESTOR) 5 MG tablet     senna-docusate (SENOKOT-S/PERICOLACE) 8.6-50 MG tablet     tamsulosin (FLOMAX) 0.4 MG capsule     vitamin D3 (VITAMIN D3) 50 mcg (2000 units) tablet     Current Facility-Administered Medications   Medication     aflibercept (EYLEA) injection prefilled syringe 2 mg     aflibercept (EYLEA) injection prefilled syringe 2 mg         Medication Compliance:  Yes    Changes in Health Issues:   None reported    Chemical Use Review:   Substance Use: Chemical use reviewed, no active concerns identified      Tobacco Use: No current tobacco use.         Assessment: Current Emotional / Mental Status (status of significant symptoms):  Risk status (Self / Other harm or suicidal ideation)  Patient denies a history of suicidal ideation, suicide attempts, self-injurious behavior, homicidal ideation, homicidal behavior and and other safety concerns     Patient denies current fears or concerns for personal safety.  Patient denies current or recent suicidal ideation or behaviors.  Patient denies current or recent homicidal ideation or behaviors.  Patient denies current or recent self injurious behavior or ideation.  Patient denies other safety  concerns.     A safety and risk management plan has not been developed at this time, however patient was encouraged to call [facility] should there be a change in any of these risk factors.    [place] Suicide Severity Rating Scale (Short Version)  [place] Suicide Severity Rating (Short Version) 11/10/2019 12/2/2019 12/10/2019 6/11/2020 7/1/2020 7/12/2021 1/10/2022   Over the past 2 weeks have you felt down, depressed, or hopeless? no yes yes no no no no   Over the past 2 weeks have you had thoughts of killing yourself? no yes no no no no no   Comments - lots of stressors, has thought about not taking meds - - - - -   Have you ever attempted to kill yourself? no no no no no no no   Q1 Wished to be Dead (Past Month) - other (see comments) no - - - -   Comments - why did i do this surgery - - - - -   Q2 Suicidal Thoughts (Past Month) - no no - - - -   Comments - wishes he wouldn't have gone through surgery - - - - -   Q3 Suicidal Thought Method - no no - - - -   Q4 Suicidal Intent without Specific Plan - no no - - - -   Q5 Suicide Intent with Specific Plan - no no - - - -   Q6 Suicide Behavior (Lifetime) - no no - - - -         Appearance:   Appropriate   Eye Contact:   Good   Psychomotor Behavior: Restless   Attitude:   Cooperative  Interested Friendly Pleasant  Orientation:   All  Speech   Rate / Production: Normal/ Responsive   Volume:  Normal   Mood:    Depressed ; improving  Affect:    Appropriate    Thought Content:  Clear   Thought Form:  Goal Directed  Logical   Insight:    Good     Diagnoses:  1. Bipolar 1 disorder, depressed, mild (H)          Collateral Reports Completed:  Not Applicable    Plan: (Homework, other):  Continue with this writer for individual psychotherapy in 2 wks. He will continue to work on managing depression and anxiety through achievable behavioral activation ideas. Information about grief/loss provided  today.  No CD issues.     Joseph Murillo, RED  November 7,  2022            ______________________________________________________________________                                            Individual Treatment Plan    Patient's Name: Frandy Workman  YOB: 1965    Date of Creation: 3/1/2022  Date Treatment Plan Last Reviewed/Revised: September 29, 2022    DSM5 Diagnoses: 296.51 Bipolar I Disorder Current or Most Recent Episode Depressed, Mild or 300.02 (F41.1) Generalized Anxiety Disorder  Psychosocial / Contextual Factors: Post Solid Organ Tx  PROMIS (reviewed every 90 days): 4    Referral / Collaboration:  Referral to another professional/service is not indicated at this time..    Anticipated number of session for this episode of care: 4-6  Anticipation frequency of session: Every other week  Anticipated Duration of each session: 38-52 minutes  Treatment plan will be reviewed in 90 days or when goals have been changed.       MeasurableTreatment Goal(s) related to diagnosis / functional impairment(s)  Goal 1: Patient will experience a reduction in depressive symptoms along with a corresponding increase in positive emotion and life satisfaction.      Objective #A (Patient Action)    Patient will Increase interest, engagement, and pleasure in doing things.  Status: Continued - Date(s): 9/29/2022    Intervention(s)  Therapist will help patient identify pleasant and mastery oriented events that elicit positive, relaxed mood.    Objective #B  Patient will Decrease frequency and intensity of feeling down, depressed, hopeless.  Status: Continued - Date(s): 9/29/2022    Intervention(s)  Therapist will introduce patient to cognitive-behavioral and acceptance and commitment therapy topics aimed to help reduce depression and anxiety    Objective #C  Patient will Identify negative self-talk and behaviors: challenge core beliefs, myths, and actions  Improve concentration, focus, and mindfulness in daily activities .  Status: Continued - Date(s):  "9/29/2022    Intervention(s)  Therapist will help patient identify and manage negative self-talk and automatic thoughts; introduce patient to cognitive distortions; help patient develop cognitive diffusion techniques      Goal 2: Patient will experience a reduction in anxious symptoms, along with a corresponding increase in relaxed emotional states and life satisfaction.      Objective #A (Patient Action)  Patient will use cognitive-behavioral and thought diffusion strategies identified in therapy to challenge anxious thoughts.    Status: Continued - Date(s): 9/29/2022    Intervention(s)  Therapist will utilize CBT and ACT ideas to help patient challenge anxious thoughts and reduce intensity/duration of anxious distress    Objective #B  Patient will use \"worry time\" each day for 15 minutes of scheduled worry and then defer obsessive or anxious thinking until the next structured worry time.    Status: Continued - Date(s): 9/29/2022    Intervention(s)  Therapist will teach patient how to effectively utilize worry time and/or thought logs/journals each day and incorporate more relaxation behaviors into their routine.    Objective #C  Patient will identify the stressors which contribute to feelings of anxiety  Patient will increase engagement in adaptive coping skills and recreational activities, such as exercise and healthy socialization, to manage distress.  Status: Continued - Date(s): 9/29/2022    Intervention(s)  Therapist will help patient identify triggers/situational factors that contribute to anxiety and behavioral skills aimed to manage anxious distress.      Goal 3: Patiient will work toward adaptively managing bipolar-related depression and manic episodes      Objective #A (Patient Action)    Status: Continued - Date(s): COMPLETE    Patient will identify 2-3 signs or signals of emerging mood instability.    Intervention(s)  Therapist will provide educational materials on bipolar disorder and help identifying " triggers for mood instability.    Objective #B  Patient will identify 2-3 strategies for more effectively managing Bipolar Disorder.    Status: Continued - Date(s): COMPLETE    Intervention(s)  Therapist will teach emotional recognition/identification. Skills aimed to effectively manage bipolar disorder.      Other Possible Therapeutic Intervention(s):    Psycho-education regarding mental health diagnoses and treatment options    Supportive Therapy    Provide affirmations, reflections, and establish working rapport    Emphasize and reflect on strength of therapeutic relationship    Skills training    Explore skills useful to client in current situation.    Skills include assertiveness, communication, conflict management, problem-solving, relaxation, etc.    Solution-Focused Therapy    Explore patterns in patient's relationships and discuss options for new behaviors.    Explore patterns in patient's actions and choices and discuss options for new behaviors.    Cognitive-behavioral Therapy    Discuss common cognitive distortions, identify them in patient's life.    Explore ways to challenge, replace, and act against these cognitions.    Acceptance and Commitment Therapy    Explore and identify important values in patient's life.    Discuss ways to commit to behavioral activation around these values.    Psychodynamic psychotherapy    Discuss patient's emotional dynamics and issues and how they impact behaviors.    Explore patient's history of relationships and how they impact present behaviors.    Explore how to work with and make changes in these schemas and patterns.    Narrative Therapy    Explore the patient's story of his/her life from his/her perspective.    Explore alternate ways of understanding their experience, identifying exceptions, developing new themes.    Interpersonal Psychotherapy    Explore patterns in relationships that are effective or ineffective at helping patient reach their goals, find  satisfying experience.    Discuss new patterns or behaviors to engage in for improved social functioning.    Behavioral Activation    Discuss steps patient can take to become more involved in meaningful activity.    Identify barriers to these activities and explore possible solutions.    Mindfulness-Based Strategies    Discuss skills based on development and application of mindfulness.    Skills drawn from compassion-focused therapy, dialectical behavior therapy, mindfulness-based stress reduction, mindfulness-based cognitive therapy, etc.      Patient has reviewed and agreed to the above plan.      Joseph Murillo Psy.D, LP   Behavioral Health Clinician   -[facility]     9/29/2022  Answers for HPI/ROS submitted by the patient on 10/19/2022  If you checked off any problems, how difficult have these problems made it for you to do your work, take care of things at home, or get along with other people?: Somewhat difficult  PHQ9 TOTAL SCORE: 2

## 2022-11-16 ENCOUNTER — OFFICE VISIT (OUTPATIENT)
Dept: OPHTHALMOLOGY | Facility: CLINIC | Age: 57
End: 2022-11-16
Attending: OPHTHALMOLOGY
Payer: MEDICARE

## 2022-11-16 DIAGNOSIS — H25.12 AGE-RELATED NUCLEAR CATARACT OF LEFT EYE: Primary | ICD-10-CM

## 2022-11-16 DIAGNOSIS — H25.13 NUCLEAR SCLEROSIS OF BOTH EYES: ICD-10-CM

## 2022-11-16 DIAGNOSIS — E11.3313 TYPE 2 DIABETES MELLITUS WITH MODERATE NONPROLIFERATIVE RETINOPATHY OF BOTH EYES AND MACULAR EDEMA, UNSPECIFIED WHETHER LONG TERM INSULIN USE (H): Primary | ICD-10-CM

## 2022-11-16 PROCEDURE — 67028 INJECTION EYE DRUG: CPT | Mod: LT | Performed by: OPHTHALMOLOGY

## 2022-11-16 PROCEDURE — 999N000103 HC STATISTIC NO CHARGE FACILITY FEE

## 2022-11-16 PROCEDURE — 250N000011 HC RX IP 250 OP 636: Performed by: OPHTHALMOLOGY

## 2022-11-16 RX ADMIN — AFLIBERCEPT 2 MG: 40 INJECTION, SOLUTION INTRAVITREAL at 11:24

## 2022-11-16 ASSESSMENT — CONF VISUAL FIELD
OD_SUPERIOR_NASAL_RESTRICTION: 0
METHOD: COUNTING FINGERS
OD_INFERIOR_NASAL_RESTRICTION: 0
OD_NORMAL: 1
OD_INFERIOR_TEMPORAL_RESTRICTION: 0
OS_SUPERIOR_NASAL_RESTRICTION: 0
OS_INFERIOR_TEMPORAL_RESTRICTION: 0
OS_INFERIOR_NASAL_RESTRICTION: 0
OD_SUPERIOR_TEMPORAL_RESTRICTION: 0
OS_SUPERIOR_TEMPORAL_RESTRICTION: 0
OS_NORMAL: 1

## 2022-11-16 ASSESSMENT — EXTERNAL EXAM - RIGHT EYE: OD_EXAM: NORMAL

## 2022-11-16 ASSESSMENT — REFRACTION_WEARINGRX
OS_CYLINDER: +0.50
OS_AXIS: 044
OS_ADD: +2.00
OD_ADD: +2.00
OS_SPHERE: -6.75
OD_SPHERE: -7.00
OD_CYLINDER: +0.75
OD_AXIS: 131
SPECS_TYPE: PAL

## 2022-11-16 ASSESSMENT — SLIT LAMP EXAM - LIDS
COMMENTS: NORMAL
COMMENTS: SMALL PAPILLOMA ALONG LL MARGIN, NON CONCERNING

## 2022-11-16 ASSESSMENT — CUP TO DISC RATIO
OS_RATIO: 0.3
OD_RATIO: 0.2

## 2022-11-16 ASSESSMENT — TONOMETRY
IOP_METHOD: TONOPEN
OS_IOP_MMHG: 19
OD_IOP_MMHG: 20

## 2022-11-16 ASSESSMENT — VISUAL ACUITY
CORRECTION_TYPE: GLASSES
METHOD: SNELLEN - LINEAR
OS_CC: 20/25
OD_CC+: -3
OD_CC: 20/25
OS_CC+: -3

## 2022-11-16 ASSESSMENT — EXTERNAL EXAM - LEFT EYE: OS_EXAM: NORMAL

## 2022-11-16 NOTE — PROGRESS NOTES
CC:  Follow up Diabetic macular edema and diabetic retinopathy     HPI: Frandy Workman is a 57 year old patient with history of Diabetes mellitus for 24 ys . Patient on insulin.      Interval History: Eyes feel better. Not having the floaters that he used to in the left eye,     Retinal Imaging:  OCT 11/02/22   RE: mild central Diabetic macular edema, relatively stable, VMA present, normal foveal contour,   LE: improved Diabetic macular edema, mild Epiretinal membrane, normal foveal contour;     fluorescein angiography 09/28/22  limited by oral dye. No obvious NVE, but peripheral leakage and capillary non perfusion; neovascularization elsewhere left eye probably not seen because of  Vitreous hemorrhage and oral fluorescein    Optos consistent with clinical exam    Assessment & Plan:    #T2DM   - HbA1c 6.0% (1/2022)  - Blood pressure (<120/80) and blood glucose (HbA1c <7.0, ~6.5 today) control discussed with patient. Patient advised that failure to adequately control each may lead to vision loss. The patient expressed understanding.    # Diabetic macular edema both eyes   - status post avastin x 4. Switch to Eylea 4/24/19 with improvement of Diabetic macular edema   - mild DME each eye today (right eye same, left eye worse)   - Last Eylea injection right eye was 5/2021, left eye 10/12/22     # Proliferative diabetic retinopathy left eye   - s/p PRP left eye 9/28/22 (#437)  # pre-proliferative diabetic retinopathy right eye    - Given VA 20/25 stable, stable Diabetic macular edema  # new vitreous hemorrhage left eye 10/12/22   Status post  Eylea inj 10.12.22  last Panretinal laser photocoagulation (PRP) 9.28.22  Continue Eylea inj q 4-5 weeks    Status post scatter PRP right eye 11/02/22     #. Senile nuclear sclerosis, bilateral   visually significant both eyes    Glare Testing (BAT)     Off Low Medium High   Right 20/25 20/25 20/30 20/40   Left 20/25 20/40 20/50 20/60+1   Edited by: Elin Rodas, CO      Visually significant:YES  Glare: Positive   Reports impacts ADL   Dilation: 5 mm  Iris expansion: yes; likely  Pseudoexfoliation: NO  Trypan Blue: yes  Phacodonesis: No  Cornea guttae: rare  Aim for: -0.5  Start artificial tears twice a day and as needed    Anesthesia: peribulbar block  Surgical time: 60 min  Candidate for toric IOL:   Anticoagulants: aspirin  Might need modified block  Alpha blockers/Flomax: YES Patient on flomax   I reviewed the indications, risks, benefits, and alternatives of the proposed surgical procedure. We discussed at length cataracts and the effect of the cataracts on vision.   We discussed the fact that modern cataract surgery is usually successful at alleviating symptoms of glare when the cataract is the causative factor. Other risks were discussed with patient including, but not limited to, failure to improve vision or further loss of vision,  and need for additional surgery, bleeding, infection, loss of vision and the remote possibility of complications of anesthesia. 1:1000 risk of infection/bleed/loss of eye; 1:100 risk of RD and need for further surgery. Patient agreed to proceed with surgery.  I provided multiple opportunities for the questions, answered all questions to the best of my ability, and confirmed that my answers and my discussion were understood.       Patient interested in Cataract extraction intraocular lens next yr left eye first    # Dry eye syndrome   Left eye worse than right eye   warm compresses and artificial tears  As needed  -punctal plugs previously left eye - reports this is better     # Status post liver transplant   - tx 11/2019   - severe anemia; s/p transfusion - anemia much improved    - being seen by his primary team    PLAN:  Follow up  5-6 weeks Eylea left eye     Retina detachment precautions were discussed with the patient (presence or increased in flashes, floaters or a curtain in the visual field) and was asked to return if any of the those  occur     Layton Shanks MD  Vitreoretinal Surgical Fellow, PGY6  [facility]    ~~~~~~~~~~~~~~~~~~~~~~~~~~~~~~~~~~   Complete documentation of historical and exam elements from today's encounter can be found in the full encounter summary report (not reduplicated in this progress note).  I personally obtained the chief complaint(s) and history of present illness.  I confirmed and edited as necessary the review of systems, past medical/surgical history, family history, social history, and examination findings as documented by others; and I examined the patient myself.  I personally reviewed the relevant tests, images, and reports as documented above.  I personally reviewed the ophthalmic test(s) associated with this encounter, agree with the interpretation(s) as documented by the resident/fellow, and have edited the corresponding report(s) as necessary.   I formulated and edited as necessary the assessment and plan and discussed the findings and management plan with the patient and family and I was present for critical portions of the procedure carried out by the resident/fellow and immediately available for the entire procedure    Enriqueta Martin MD   of Ophthalmology.  Retina Service   Department of Ophthalmology and Visual Neurosciences   [facility]  Phone: [phone]   Fax: [phone]

## 2022-11-16 NOTE — NURSING NOTE
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     Chief Complaint(s) and History of Present Illness(es)     Follow Up            Laterality: both eyes    Course: stable    Associated symptoms: flashes and itching.  Negative for eye pain and floaters    Treatments tried: artificial tears    Pain scale: 0/10    Comments: Type 2 diabetes mellitus with moderate nonproliferative retinopathy of both eyes and macular edema          Comments    He states that his vision has seemed stable in both eyes, since his last eye exam.    Lab Results       Component                Value               Date                       A1C                      6.0                 01/10/2022                 A1C                      6.8                 07/13/2021                 A1C                      6.0                 12/30/2020                 A1C                      6.3                 06/13/2020                 A1C                      6.6                 08/08/2018                 A1C                      6.5                 06/09/2017                 A1C                      7.8                 10/25/2016              DAY Carroll 10:24 AM  November 16, 2022

## 2022-11-16 NOTE — Clinical Note
Patient interested in Cataract extraction intraocular lens next yr left eye first. Jan or Feb Juan pls put orders

## 2022-11-18 ENCOUNTER — TELEPHONE (OUTPATIENT)
Dept: PHARMACY | Facility: CLINIC | Age: 57
End: 2022-11-18

## 2022-11-18 NOTE — TELEPHONE ENCOUNTER
Clinical Pharmacy Consult:                                                      Transplant Specific:  36 Month Post Transplant Medication Call  Date of Transplant: 11/11/2019  Type of Transplant: liver  First Transplant: yes  History of rejection: no    Immunosuppression Regimen   MMF 500mg qAM & 500mg qPM and Prednisone 5mg qAM  Patient specific goal: Not followed  Pt adherent to lab draws: yes  Scr:   Lab Results   Component Value Date    CR 1.16 05/21/2020     Side effects: None    Prophylactic Medications  Antibacterial:  Completed    Antifungal: Not needed thus far    Antiviral: Valcyte completed     Acid Reducer: Protonix (Pantoprazole)  Scheduled Reviewed Date: up to MD    Thrombosis Prevention: Aspirin 81 mg 2 PO daily  Scheduled Discontinue Date: managed by clinic    Blood Pressure Management  Frequency of home Blood Pressure checks: not checking  Most recent home BP:   Patient Blood pressure goal: <140/90  Patient blood pressure at goal:  Yes    Hospitalizations/ER visits since last assessment: 0    Med rec/DUR performed: yes  Med Rec Discrepancies: yes has stopped lisinopril    Medication adherence flowsheet 11/18/2022   Patient medication administration: Has assistance with medications   Patient estimated adherence level: %   Pharmacist assessment of adherence: Good   Patient reported doses missed per week: 0-1   Pharmacy MPR: -   Facilitators to medication adherence  Medication dosing chart;Pill box;Schedule/routine;Cell phone;Caregiver assistance   Patient reported barriers to medication adherence  -   Adherence intervention(s): -      Medication access flowsheet 11/18/2022   Number of pharmacies used: 1   Pharmacy: [facility]   Other pharmacy: -   Enrolled in [facility] pharmacy? Yes   Patient reported barriers to accessing medications: -   Medication access interventions: -          Miller reports feeling ok.  He has some abdominal pain which is attributed muscles not GI or  liver.    He has a friend help set up his meds, he uses a list, a pill box and sets alarms for 8 and 8.    Not checking blood pressure. Last charted 129/84      Miller has no questions or concerns today.    Michael Fraire [facility]  Specialty Pharmacist [phone]

## 2022-11-21 ASSESSMENT — PATIENT HEALTH QUESTIONNAIRE - PHQ9
SUM OF ALL RESPONSES TO PHQ QUESTIONS 1-9: 4
10. IF YOU CHECKED OFF ANY PROBLEMS, HOW DIFFICULT HAVE THESE PROBLEMS MADE IT FOR YOU TO DO YOUR WORK, TAKE CARE OF THINGS AT HOME, OR GET ALONG WITH OTHER PEOPLE: VERY DIFFICULT
SUM OF ALL RESPONSES TO PHQ QUESTIONS 1-9: 4

## 2022-11-22 ENCOUNTER — VIRTUAL VISIT (OUTPATIENT)
Dept: BEHAVIORAL HEALTH | Facility: CLINIC | Age: 57
End: 2022-11-22
Payer: MEDICARE

## 2022-11-22 DIAGNOSIS — F31.31 BIPOLAR 1 DISORDER, DEPRESSED, MILD (H): Primary | ICD-10-CM

## 2022-11-22 PROCEDURE — 90832 PSYTX W PT 30 MINUTES: CPT | Mod: 95 | Performed by: PSYCHOLOGIST

## 2022-11-22 NOTE — PROGRESS NOTES
[facility]: Integrated Behavioral Health  November 22, 2022      Behavioral Health Clinician Progress Note    Patient Name: Frandy Workman           Service Type: Video visit      Service Location:  Video visit      Session Start Time: 2:05  Session End Time:  2:25      Session Length: 16 - 37      Attendees: Patient    Visit Activities (Refresh list every visit): [facility] Only     Service Modality:  Video Visit:      Provider verified identity through the following two step process.  Patient provided:  Patient photo and Patient is known previously to provider    Telemedicine Visit: The patient's condition can be safely assessed and treated via synchronous audio and visual telemedicine encounter.      Reason for Telemedicine Visit: Services only offered telehealth    Originating Site (Patient Location): Patient's home    Distant Site (Provider Location): [facility]: [facility]    Consent:  The patient/guardian has verbally consented to: the potential risks and benefits of telemedicine (video visit) versus in person care; bill my insurance or make self-payment for services provided; and responsibility for payment of non-covered services.     Patient would like the video invitation sent by:  My Chart    Mode of Communication:  Video Conference via [facility]    Distant Location (Provider):  On-site    As the provider I attest to compliance with applicable laws and regulations related to telemedicine.      Diagnostic Assessment Date:  12/03/2020  Treatment Plan Review Date:  12/29/2022  See Flowsheets for today's PHQ-9 and LIZZETTE-7 results  Previous PHQ-9:   PHQ-9 SCORE 9/8/2022 10/19/2022 11/21/2022   PHQ-9 Total Score MyChart 3 (Minimal depression) 2 (Minimal depression) 4 (Minimal depression)   PHQ-9 Total Score 3 2 4     Previous LIZZETTE-7:   LIZZETTE-7 SCORE 12/29/2020 4/7/2021 9/30/2021   Total Score 8 (mild anxiety) 9 (mild anxiety) -   Total Score 8 9 10       Extended Session (60+  minutes): No  Interactive Complexity: No  Crisis: No    Treatment Objective(s) Addressed in This Session:  Target Behavior(s): disease management/lifestyle changes  related to adaptive approaches to managing anxious distress and mood difficulties    Depressed mood: Increase interest, pleasure in doing things.    Current Stressors / Issues:  [facility] met with Miller today for a follow-up. Reports low mood and trouble with energy. Reports struggling to get up, build motivation for things he has to do during the day. Miller wishes to discuss adaptive ways of building motivation, getting out of bed. Explored behavioral strategies he could implement, including scheduling pleasant/mastery events, trying to take smaller steps with things to do (e.g. focusing on just getting to the bathroom to brush his teeth, rather than focusing on the entire day), and keeping his alarm away from his bed so it forces him to get up in the AM, not hit snooze. Miller states these strategies sound helpful for him and agreed to try them for our next session. Due to his more acute depression, we agreed to meet again next week to discuss and continue managing his symptoms.     Answers for HPI/ROS submitted by the patient on 7/12/2022  If you checked off any problems, how difficult have these problems made it for you to do your work, take care of things at home, or get along with other people?: Somewhat difficult  PHQ9 TOTAL SCORE: 4    Answers for HPI/ROS submitted by the patient on 9/8/2022  If you checked off any problems, how difficult have these problems made it for you to do your work, take care of things at home, or get along with other people?: Somewhat difficult  PHQ9 TOTAL SCORE: 3    Answers for HPI/ROS submitted by the patient on 11/21/2022  If you checked off any problems, how difficult have these problems made it for you to do your work, take care of things at home, or get along with other people?: Very difficult  PHQ9 TOTAL SCORE:  4          Progress on Treatment Objective(s) / Homework:  Worsening - PREPARATION (Decided to change - considering how); Intervened by negotiating a change plan and determining options / strategies for behavior change, identifying triggers, exploring social supports, and working towards setting a date to begin behavior change      Solution-Focused Therapy    Explore patterns in patient's relationships and discuss options for new behaviors.    Explore patterns in patient's actions and choices and discuss options for new behaviors.    Behavioral Activation    Discuss steps patient can take to become more involved in meaningful activity.    Identify barriers to these activities and explore possible solutions.      Answers for HPI/ROS submitted by the patient on 3/21/2022  If you checked off any problems, how difficult have these problems made it for you to do your work, take care of things at home, or get along with other people?: Not difficult at all  PHQ9 TOTAL SCORE: 6      Answers for HPI/ROS submitted by the patient on 2/8/2022  If you checked off any problems, how difficult have these problems made it for you to do your work, take care of things at home, or get along with other people?: Somewhat difficult  PHQ9 TOTAL SCORE: 4      Answers for HPI/ROS submitted by the patient on 4/7/2021   LIZZETTE 7 TOTAL SCORE: 9  If you checked off any problems, how difficult have these problems made it for you to do your work, take care of things at home, or get along with other people?: Very difficult  PHQ9 TOTAL SCORE: 7*    *No SI    Answers for HPI/ROS submitted by the patient on 10/13/2020   If you checked off any problems, how difficult have these problems made it for you to do your work, take care of things at home, or get along with other people?: Somewhat difficult  PHQ9 TOTAL SCORE: 8*  LIZZETTE 7 TOTAL SCORE: 6      *No SI     Answers for HPI/ROS submitted by the patient on 12/29/2020   If you checked off any problems, how  difficult have these problems made it for you to do your work, take care of things at home, or get along with other people?: Somewhat difficult  PHQ9 TOTAL SCORE: 5*  LIZZETTE 7 TOTAL SCORE: 8    *No SI     Answers for HPI/ROS submitted by the patient on 11/21/2022  If you checked off any problems, how difficult have these problems made it for you to do your work, take care of things at home, or get along with other people?: Very difficult  PHQ9 TOTAL SCORE: 4          Care Plan review completed: no    Medication Review:  No changes to current psychiatric medication(s)     Current Outpatient Medications   Medication     Acetaminophen (TYLENOL) 325 MG CAPS     ammonium lactate (AMLACTIN) 12 % external cream     ARIPiprazole (ABILIFY) 5 MG tablet     aspirin (SM ASPIRIN ADULT LOW STRENGTH) 81 MG EC tablet     BD VIKTORIA U/F 32G X 4 MM insulin pen needle     ciclopirox (LOPROX) 0.77 % cream     Continuous Blood Gluc  (FREESTYLE CLAUDIA 14 DAY READER) CECILIO     Continuous Blood Gluc Sensor (FREESTYLE CLAUDIA 2 SENSOR) MISC     cyanocobalamin (VITAMIN B-12) 1000 MCG tablet     empagliflozin (JARDIANCE) 10 MG TABS tablet     insulin aspart (NOVOLOG PEN) 100 UNIT/ML pen     insulin degludec (TRESIBA FLEXTOUCH) 100 UNIT/ML pen     lamiVUDine (EPIVIR) 100 MG tablet     lisinopril (ZESTRIL) 5 MG tablet     methocarbamol (ROBAXIN) 750 MG tablet     metoprolol succinate ER (TOPROL XL) 25 MG 24 hr tablet     Multiple Vitamin (TAB-A-GLADIS) TABS     mycophenolate (GENERIC EQUIVALENT) 250 MG capsule     order for DME     pantoprazole (PROTONIX) 40 MG EC tablet     polyethylene glycol (MIRALAX) 17 g packet     predniSONE (DELTASONE) 5 MG tablet     rosuvastatin (CRESTOR) 5 MG tablet     tamsulosin (FLOMAX) 0.4 MG capsule     vitamin D3 (VITAMIN D3) 50 mcg (2000 units) tablet     Current Facility-Administered Medications   Medication     aflibercept (EYLEA) injection prefilled syringe 2 mg     aflibercept (EYLEA) injection prefilled  syringe 2 mg         Medication Compliance:  Yes    Changes in Health Issues:   None reported    Chemical Use Review:   Substance Use: Chemical use reviewed, no active concerns identified      Tobacco Use: No current tobacco use.         Assessment: Current Emotional / Mental Status (status of significant symptoms):  Risk status (Self / Other harm or suicidal ideation)  Patient denies a history of suicidal ideation, suicide attempts, self-injurious behavior, homicidal ideation, homicidal behavior and and other safety concerns     Patient denies current fears or concerns for personal safety.  Patient denies current or recent suicidal ideation or behaviors.  Patient denies current or recent homicidal ideation or behaviors.  Patient denies current or recent self injurious behavior or ideation.  Patient denies other safety concerns.     A safety and risk management plan has not been developed at this time, however patient was encouraged to call [place] [phone] should there be a change in any of these risk factors.    [place] Suicide Severity Rating Scale (Short Version)  [place] Suicide Severity Rating (Short Version) 11/10/2019 12/2/2019 12/10/2019 6/11/2020 7/1/2020 7/12/2021 1/10/2022   Over the past 2 weeks have you felt down, depressed, or hopeless? no yes yes no no no no   Over the past 2 weeks have you had thoughts of killing yourself? no yes no no no no no   Comments - lots of stressors, has thought about not taking meds - - - - -   Have you ever attempted to kill yourself? no no no no no no no   Q1 Wished to be Dead (Past Month) - other (see comments) no - - - -   Comments - why did i do this surgery - - - - -   Q2 Suicidal Thoughts (Past Month) - no no - - - -   Comments - wishes he wouldn't have gone through surgery - - - - -   Q3 Suicidal Thought Method - no no - - - -   Q4 Suicidal Intent without Specific Plan - no no - - - -   Q5 Suicide Intent with Specific Plan - no no - - - -   Q6 Suicide Behavior  (Lifetime) - no no - - - -         Appearance:   Appropriate   Eye Contact:   Good   Psychomotor Behavior: TD evident   Attitude:   Cooperative  Interested Friendly Pleasant  Orientation:   All  Speech   Rate / Production: Normal/ Responsive   Volume:  Normal   Mood:    Depressed ; improving  Affect:    Appropriate    Thought Content:  Clear   Thought Form:  Goal Directed  Logical   Insight:    Good     Diagnoses:  1. Bipolar 1 disorder, depressed, mild (H)          Collateral Reports Completed:  Not Applicable    Plan: (Homework, other):  Continue with this writer for individual psychotherapy in one wks. He will continue to work on managing depression and anxiety through achievable behavioral activation ideas. Information about behavioral activation provided  today.  No CD issues.     Joseph Murillo, RED  November 22, 2022              ______________________________________________________________________                                            Individual Treatment Plan    Patient's Name: Frandy Workman  YOB: 1965    Date of Creation: 3/1/2022  Date Treatment Plan Last Reviewed/Revised: September 29, 2022    DSM5 Diagnoses: 296.51 Bipolar I Disorder Current or Most Recent Episode Depressed, Mild or 300.02 (F41.1) Generalized Anxiety Disorder  Psychosocial / Contextual Factors: Post Solid Organ Tx  PROMIS (reviewed every 90 days): 4    Referral / Collaboration:  Referral to another professional/service is not indicated at this time..    Anticipated number of session for this episode of care: 4-6  Anticipation frequency of session: Every other week  Anticipated Duration of each session: 38-52 minutes  Treatment plan will be reviewed in 90 days or when goals have been changed.       MeasurableTreatment Goal(s) related to diagnosis / functional impairment(s)  Goal 1: Patient will experience a reduction in depressive symptoms along with a corresponding increase in positive emotion and life  "satisfaction.      Objective #A (Patient Action)    Patient will Increase interest, engagement, and pleasure in doing things.  Status: Continued - Date(s): 9/29/2022    Intervention(s)  Therapist will help patient identify pleasant and mastery oriented events that elicit positive, relaxed mood.    Objective #B  Patient will Decrease frequency and intensity of feeling down, depressed, hopeless.  Status: Continued - Date(s): 9/29/2022    Intervention(s)  Therapist will introduce patient to cognitive-behavioral and acceptance and commitment therapy topics aimed to help reduce depression and anxiety    Objective #C  Patient will Identify negative self-talk and behaviors: challenge core beliefs, myths, and actions  Improve concentration, focus, and mindfulness in daily activities .  Status: Continued - Date(s): 9/29/2022    Intervention(s)  Therapist will help patient identify and manage negative self-talk and automatic thoughts; introduce patient to cognitive distortions; help patient develop cognitive diffusion techniques      Goal 2: Patient will experience a reduction in anxious symptoms, along with a corresponding increase in relaxed emotional states and life satisfaction.      Objective #A (Patient Action)  Patient will use cognitive-behavioral and thought diffusion strategies identified in therapy to challenge anxious thoughts.    Status: Continued - Date(s): 9/29/2022    Intervention(s)  Therapist will utilize CBT and ACT ideas to help patient challenge anxious thoughts and reduce intensity/duration of anxious distress    Objective #B  Patient will use \"worry time\" each day for 15 minutes of scheduled worry and then defer obsessive or anxious thinking until the next structured worry time.    Status: Continued - Date(s): 9/29/2022    Intervention(s)  Therapist will teach patient how to effectively utilize worry time and/or thought logs/journals each day and incorporate more relaxation behaviors into their " routine.    Objective #C  Patient will identify the stressors which contribute to feelings of anxiety  Patient will increase engagement in adaptive coping skills and recreational activities, such as exercise and healthy socialization, to manage distress.  Status: Continued - Date(s): 9/29/2022    Intervention(s)  Therapist will help patient identify triggers/situational factors that contribute to anxiety and behavioral skills aimed to manage anxious distress.      Goal 3: Patiient will work toward adaptively managing bipolar-related depression and manic episodes      Objective #A (Patient Action)    Status: Continued - Date(s): COMPLETE    Patient will identify 2-3 signs or signals of emerging mood instability.    Intervention(s)  Therapist will provide educational materials on bipolar disorder and help identifying triggers for mood instability.    Objective #B  Patient will identify 2-3 strategies for more effectively managing Bipolar Disorder.    Status: Continued - Date(s): COMPLETE    Intervention(s)  Therapist will teach emotional recognition/identification. Skills aimed to effectively manage bipolar disorder.      Other Possible Therapeutic Intervention(s):    Psycho-education regarding mental health diagnoses and treatment options    Supportive Therapy    Provide affirmations, reflections, and establish working rapport    Emphasize and reflect on strength of therapeutic relationship    Skills training    Explore skills useful to client in current situation.    Skills include assertiveness, communication, conflict management, problem-solving, relaxation, etc.    Solution-Focused Therapy    Explore patterns in patient's relationships and discuss options for new behaviors.    Explore patterns in patient's actions and choices and discuss options for new behaviors.    Cognitive-behavioral Therapy    Discuss common cognitive distortions, identify them in patient's life.    Explore ways to challenge, replace, and  act against these cognitions.    Acceptance and Commitment Therapy    Explore and identify important values in patient's life.    Discuss ways to commit to behavioral activation around these values.    Psychodynamic psychotherapy    Discuss patient's emotional dynamics and issues and how they impact behaviors.    Explore patient's history of relationships and how they impact present behaviors.    Explore how to work with and make changes in these schemas and patterns.    Narrative Therapy    Explore the patient's story of his/her life from his/her perspective.    Explore alternate ways of understanding their experience, identifying exceptions, developing new themes.    Interpersonal Psychotherapy    Explore patterns in relationships that are effective or ineffective at helping patient reach their goals, find satisfying experience.    Discuss new patterns or behaviors to engage in for improved social functioning.    Behavioral Activation    Discuss steps patient can take to become more involved in meaningful activity.    Identify barriers to these activities and explore possible solutions.    Mindfulness-Based Strategies    Discuss skills based on development and application of mindfulness.    Skills drawn from compassion-focused therapy, dialectical behavior therapy, mindfulness-based stress reduction, mindfulness-based cognitive therapy, etc.      Patient has reviewed and agreed to the above plan.      Joseph Murillo Psy.D, LP   Behavioral Health Clinician   -[facility]     9/29/2022  Answers for HPI/ROS submitted by the patient on 10/19/2022  If you checked off any problems, how difficult have these problems made it for you to do your work, take care of things at home, or get along with other people?: Somewhat difficult  PHQ9 TOTAL SCORE: 2

## 2022-11-29 ENCOUNTER — VIRTUAL VISIT (OUTPATIENT)
Dept: BEHAVIORAL HEALTH | Facility: CLINIC | Age: 57
End: 2022-11-29
Payer: MEDICARE

## 2022-11-29 DIAGNOSIS — F31.31 BIPOLAR 1 DISORDER, DEPRESSED, MILD (H): Primary | ICD-10-CM

## 2022-11-29 PROCEDURE — 90832 PSYTX W PT 30 MINUTES: CPT | Mod: 95 | Performed by: PSYCHOLOGIST

## 2022-11-29 NOTE — PROGRESS NOTES
[facility]: Integrated Behavioral Health  November 29, 2022      Behavioral Health Clinician Progress Note    Patient Name: Frandy Workman           Service Type: Video visit      Service Location:  Video visit      Session Start Time: 11:05  Session End Time:  11:23      Session Length: 16 - 37      Attendees: Patient    Visit Activities (Refresh list every visit): [facility] Only     Service Modality:  Video Visit:      Provider verified identity through the following two step process.  Patient provided:  Patient photo and Patient is known previously to provider    Telemedicine Visit: The patient's condition can be safely assessed and treated via synchronous audio and visual telemedicine encounter.      Reason for Telemedicine Visit: Services only offered telehealth    Originating Site (Patient Location): Patient's home    Distant Site (Provider Location): Provider Remote Setting- Home Office    Consent:  The patient/guardian has verbally consented to: the potential risks and benefits of telemedicine (video visit) versus in person care; bill my insurance or make self-payment for services provided; and responsibility for payment of non-covered services.     Patient would like the video invitation sent by:  My Chart    Mode of Communication:  Video Conference via [facility]    Distant Location (Provider):  Off-site    As the provider I attest to compliance with applicable laws and regulations related to telemedicine.      Diagnostic Assessment Date:  12/03/2020  Treatment Plan Review Date:  12/29/2022  See Flowsheets for today's PHQ-9 and LIZZETTE-7 results  Previous PHQ-9:   PHQ-9 SCORE 9/8/2022 10/19/2022 11/21/2022   PHQ-9 Total Score MyChart 3 (Minimal depression) 2 (Minimal depression) 4 (Minimal depression)   PHQ-9 Total Score 3 2 4     Previous LIZZETTE-7:   LIZZETTE-7 SCORE 12/29/2020 4/7/2021 9/30/2021   Total Score 8 (mild anxiety) 9 (mild anxiety) -   Total Score 8 9 10       Extended Session  (60+ minutes): No  Interactive Complexity: No  Crisis: No    Treatment Objective(s) Addressed in This Session:  Target Behavior(s): disease management/lifestyle changes  related to adaptive approaches to managing anxious distress and mood difficulties    Depressed mood: Increase interest, pleasure in doing things.    Current Stressors / Issues:  [facility] met with Miller today for a follow-up. Reports working on motivation ideas we discussed, notes they are helping him get out of bed, engage more with his health, like taking medications on time and checking his blood sugar levels. Affirmed the adaptive steps he has taken. Discussed his goals and breaking up tasks into smaller, more achievable facets for him to try, like with paying bills. Notes sleep is still an ongoing issue for him, which affects his motivation levels. Notes he plans to continue working on finding strategies to improve sleep quality, like limiting screen time.     Answers for HPI/ROS submitted by the patient on 7/12/2022  If you checked off any problems, how difficult have these problems made it for you to do your work, take care of things at home, or get along with other people?: Somewhat difficult  PHQ9 TOTAL SCORE: 4    Answers for HPI/ROS submitted by the patient on 9/8/2022  If you checked off any problems, how difficult have these problems made it for you to do your work, take care of things at home, or get along with other people?: Somewhat difficult  PHQ9 TOTAL SCORE: 3    Answers for HPI/ROS submitted by the patient on 11/21/2022  If you checked off any problems, how difficult have these problems made it for you to do your work, take care of things at home, or get along with other people?: Very difficult  PHQ9 TOTAL SCORE: 4          Progress on Treatment Objective(s) / Homework:  Worsening - PREPARATION (Decided to change - considering how); Intervened by negotiating a change plan and determining options / strategies for behavior change,  identifying triggers, exploring social supports, and working towards setting a date to begin behavior change      Solution-Focused Therapy    Explore patterns in patient's relationships and discuss options for new behaviors.    Explore patterns in patient's actions and choices and discuss options for new behaviors.    Behavioral Activation    Discuss steps patient can take to become more involved in meaningful activity.    Identify barriers to these activities and explore possible solutions.      Answers for HPI/ROS submitted by the patient on 3/21/2022  If you checked off any problems, how difficult have these problems made it for you to do your work, take care of things at home, or get along with other people?: Not difficult at all  PHQ9 TOTAL SCORE: 6      Answers for HPI/ROS submitted by the patient on 2/8/2022  If you checked off any problems, how difficult have these problems made it for you to do your work, take care of things at home, or get along with other people?: Somewhat difficult  PHQ9 TOTAL SCORE: 4      Answers for HPI/ROS submitted by the patient on 4/7/2021   LIZZETTE 7 TOTAL SCORE: 9  If you checked off any problems, how difficult have these problems made it for you to do your work, take care of things at home, or get along with other people?: Very difficult  PHQ9 TOTAL SCORE: 7*    *No SI    Answers for HPI/ROS submitted by the patient on 10/13/2020   If you checked off any problems, how difficult have these problems made it for you to do your work, take care of things at home, or get along with other people?: Somewhat difficult  PHQ9 TOTAL SCORE: 8*  LIZZETTE 7 TOTAL SCORE: 6      *No SI     Answers for HPI/ROS submitted by the patient on 12/29/2020   If you checked off any problems, how difficult have these problems made it for you to do your work, take care of things at home, or get along with other people?: Somewhat difficult  PHQ9 TOTAL SCORE: 5*  LIZZETTE 7 TOTAL SCORE: 8    *No SI     Answers for HPI/ROS  submitted by the patient on 11/21/2022  If you checked off any problems, how difficult have these problems made it for you to do your work, take care of things at home, or get along with other people?: Very difficult  PHQ9 TOTAL SCORE: 4          Care Plan review completed: no    Medication Review:  No changes to current psychiatric medication(s)     Current Outpatient Medications   Medication     Acetaminophen (TYLENOL) 325 MG CAPS     ammonium lactate (AMLACTIN) 12 % external cream     ARIPiprazole (ABILIFY) 5 MG tablet     aspirin (SM ASPIRIN ADULT LOW STRENGTH) 81 MG EC tablet     BD VIKTORIA U/F 32G X 4 MM insulin pen needle     ciclopirox (LOPROX) 0.77 % cream     Continuous Blood Gluc  (FREESTYLE CLAUDIA 14 DAY READER) CECILIO     Continuous Blood Gluc Sensor (FREESTYLE CLAUDIA 2 SENSOR) MISC     cyanocobalamin (VITAMIN B-12) 1000 MCG tablet     empagliflozin (JARDIANCE) 10 MG TABS tablet     insulin aspart (NOVOLOG PEN) 100 UNIT/ML pen     insulin degludec (TRESIBA FLEXTOUCH) 100 UNIT/ML pen     lamiVUDine (EPIVIR) 100 MG tablet     lisinopril (ZESTRIL) 5 MG tablet     methocarbamol (ROBAXIN) 750 MG tablet     metoprolol succinate ER (TOPROL XL) 25 MG 24 hr tablet     Multiple Vitamin (TAB-A-GLADIS) TABS     mycophenolate (GENERIC EQUIVALENT) 250 MG capsule     order for DME     pantoprazole (PROTONIX) 40 MG EC tablet     polyethylene glycol (MIRALAX) 17 g packet     predniSONE (DELTASONE) 5 MG tablet     rosuvastatin (CRESTOR) 5 MG tablet     tamsulosin (FLOMAX) 0.4 MG capsule     vitamin D3 (VITAMIN D3) 50 mcg (2000 units) tablet     Current Facility-Administered Medications   Medication     aflibercept (EYLEA) injection prefilled syringe 2 mg     aflibercept (EYLEA) injection prefilled syringe 2 mg         Medication Compliance:  Yes    Changes in Health Issues:   None reported    Chemical Use Review:   Substance Use: Chemical use reviewed, no active concerns identified      Tobacco Use: No current tobacco  use.         Assessment: Current Emotional / Mental Status (status of significant symptoms):  Risk status (Self / Other harm or suicidal ideation)  Patient denies a history of suicidal ideation, suicide attempts, self-injurious behavior, homicidal ideation, homicidal behavior and and other safety concerns     Patient denies current fears or concerns for personal safety.  Patient denies current or recent suicidal ideation or behaviors.  Patient denies current or recent homicidal ideation or behaviors.  Patient denies current or recent self injurious behavior or ideation.  Patient denies other safety concerns.     A safety and risk management plan has not been developed at this time, however patient was encouraged to call [place] [phone] should there be a change in any of these risk factors.    [place] Suicide Severity Rating Scale (Short Version)  [place] Suicide Severity Rating (Short Version) 11/10/2019 12/2/2019 12/10/2019 6/11/2020 7/1/2020 7/12/2021 1/10/2022   Over the past 2 weeks have you felt down, depressed, or hopeless? no yes yes no no no no   Over the past 2 weeks have you had thoughts of killing yourself? no yes no no no no no   Comments - lots of stressors, has thought about not taking meds - - - - -   Have you ever attempted to kill yourself? no no no no no no no   Q1 Wished to be Dead (Past Month) - other (see comments) no - - - -   Comments - why did i do this surgery - - - - -   Q2 Suicidal Thoughts (Past Month) - no no - - - -   Comments - wishes he wouldn't have gone through surgery - - - - -   Q3 Suicidal Thought Method - no no - - - -   Q4 Suicidal Intent without Specific Plan - no no - - - -   Q5 Suicide Intent with Specific Plan - no no - - - -   Q6 Suicide Behavior (Lifetime) - no no - - - -         Appearance:   Appropriate   Eye Contact:   Good   Psychomotor Behavior: TD evident   Attitude:   Cooperative  Interested Friendly Pleasant  Orientation:   All  Speech   Rate /  Production: Normal/ Responsive   Volume:  Normal   Mood:    Depressed ; improving  Affect:    Appropriate    Thought Content:  Clear   Thought Form:  Goal Directed  Logical   Insight:    Good     Diagnoses:  1. Bipolar 1 disorder, depressed, mild (H)          Collateral Reports Completed:  Not Applicable    Plan: (Homework, other):  Continue with this writer for individual psychotherapy in one wks. He will continue to work on managing depression and anxiety through achievable behavioral activation ideas. Information about behavioral activation provided  today.  No CD issues.     Joseph Murillo, RED  November 28, 2022              ______________________________________________________________________                                            Individual Treatment Plan    Patient's Name: Frandy Workman  YOB: 1965    Date of Creation: 3/1/2022  Date Treatment Plan Last Reviewed/Revised: September 29, 2022    DSM5 Diagnoses: 296.51 Bipolar I Disorder Current or Most Recent Episode Depressed, Mild or 300.02 (F41.1) Generalized Anxiety Disorder  Psychosocial / Contextual Factors: Post Solid Organ Tx  PROMIS (reviewed every 90 days): 4    Referral / Collaboration:  Referral to another professional/service is not indicated at this time..    Anticipated number of session for this episode of care: 4-6  Anticipation frequency of session: Every other week  Anticipated Duration of each session: 38-52 minutes  Treatment plan will be reviewed in 90 days or when goals have been changed.       MeasurableTreatment Goal(s) related to diagnosis / functional impairment(s)  Goal 1: Patient will experience a reduction in depressive symptoms along with a corresponding increase in positive emotion and life satisfaction.      Objective #A (Patient Action)    Patient will Increase interest, engagement, and pleasure in doing things.  Status: Continued - Date(s): 9/29/2022    Intervention(s)  Therapist will help patient identify  "pleasant and mastery oriented events that elicit positive, relaxed mood.    Objective #B  Patient will Decrease frequency and intensity of feeling down, depressed, hopeless.  Status: Continued - Date(s): 9/29/2022    Intervention(s)  Therapist will introduce patient to cognitive-behavioral and acceptance and commitment therapy topics aimed to help reduce depression and anxiety    Objective #C  Patient will Identify negative self-talk and behaviors: challenge core beliefs, myths, and actions  Improve concentration, focus, and mindfulness in daily activities .  Status: Continued - Date(s): 9/29/2022    Intervention(s)  Therapist will help patient identify and manage negative self-talk and automatic thoughts; introduce patient to cognitive distortions; help patient develop cognitive diffusion techniques      Goal 2: Patient will experience a reduction in anxious symptoms, along with a corresponding increase in relaxed emotional states and life satisfaction.      Objective #A (Patient Action)  Patient will use cognitive-behavioral and thought diffusion strategies identified in therapy to challenge anxious thoughts.    Status: Continued - Date(s): 9/29/2022    Intervention(s)  Therapist will utilize CBT and ACT ideas to help patient challenge anxious thoughts and reduce intensity/duration of anxious distress    Objective #B  Patient will use \"worry time\" each day for 15 minutes of scheduled worry and then defer obsessive or anxious thinking until the next structured worry time.    Status: Continued - Date(s): 9/29/2022    Intervention(s)  Therapist will teach patient how to effectively utilize worry time and/or thought logs/journals each day and incorporate more relaxation behaviors into their routine.    Objective #C  Patient will identify the stressors which contribute to feelings of anxiety  Patient will increase engagement in adaptive coping skills and recreational activities, such as exercise and healthy " socialization, to manage distress.  Status: Continued - Date(s): 9/29/2022    Intervention(s)  Therapist will help patient identify triggers/situational factors that contribute to anxiety and behavioral skills aimed to manage anxious distress.      Goal 3: Patiient will work toward adaptively managing bipolar-related depression and manic episodes      Objective #A (Patient Action)    Status: Continued - Date(s): COMPLETE    Patient will identify 2-3 signs or signals of emerging mood instability.    Intervention(s)  Therapist will provide educational materials on bipolar disorder and help identifying triggers for mood instability.    Objective #B  Patient will identify 2-3 strategies for more effectively managing Bipolar Disorder.    Status: Continued - Date(s): COMPLETE    Intervention(s)  Therapist will teach emotional recognition/identification. Skills aimed to effectively manage bipolar disorder.      Other Possible Therapeutic Intervention(s):    Psycho-education regarding mental health diagnoses and treatment options    Supportive Therapy    Provide affirmations, reflections, and establish working rapport    Emphasize and reflect on strength of therapeutic relationship    Skills training    Explore skills useful to client in current situation.    Skills include assertiveness, communication, conflict management, problem-solving, relaxation, etc.    Solution-Focused Therapy    Explore patterns in patient's relationships and discuss options for new behaviors.    Explore patterns in patient's actions and choices and discuss options for new behaviors.    Cognitive-behavioral Therapy    Discuss common cognitive distortions, identify them in patient's life.    Explore ways to challenge, replace, and act against these cognitions.    Acceptance and Commitment Therapy    Explore and identify important values in patient's life.    Discuss ways to commit to behavioral activation around these values.    Psychodynamic  psychotherapy    Discuss patient's emotional dynamics and issues and how they impact behaviors.    Explore patient's history of relationships and how they impact present behaviors.    Explore how to work with and make changes in these schemas and patterns.    Narrative Therapy    Explore the patient's story of his/her life from his/her perspective.    Explore alternate ways of understanding their experience, identifying exceptions, developing new themes.    Interpersonal Psychotherapy    Explore patterns in relationships that are effective or ineffective at helping patient reach their goals, find satisfying experience.    Discuss new patterns or behaviors to engage in for improved social functioning.    Behavioral Activation    Discuss steps patient can take to become more involved in meaningful activity.    Identify barriers to these activities and explore possible solutions.    Mindfulness-Based Strategies    Discuss skills based on development and application of mindfulness.    Skills drawn from compassion-focused therapy, dialectical behavior therapy, mindfulness-based stress reduction, mindfulness-based cognitive therapy, etc.      Patient has reviewed and agreed to the above plan.      Joseph Murillo Psy.D, LP   Behavioral Health Clinician   -[facility]     9/29/2022  Answers for HPI/ROS submitted by the patient on 10/19/2022  If you checked off any problems, how difficult have these problems made it for you to do your work, take care of things at home, or get along with other people?: Somewhat difficult  PHQ9 TOTAL SCORE: 2

## 2022-12-01 ENCOUNTER — OFFICE VISIT (OUTPATIENT)
Dept: DERMATOLOGY | Facility: CLINIC | Age: 57
End: 2022-12-01
Attending: INTERNAL MEDICINE
Payer: MEDICARE

## 2022-12-01 DIAGNOSIS — T38.0X5A STEROID-INDUCED ACNE: ICD-10-CM

## 2022-12-01 DIAGNOSIS — L70.8 STEROID-INDUCED ACNE: ICD-10-CM

## 2022-12-01 DIAGNOSIS — D04.4 SQUAMOUS CELL CARCINOMA IN SITU (SCCIS) OF SCALP: ICD-10-CM

## 2022-12-01 DIAGNOSIS — Z94.4 STATUS POST LIVER TRANSPLANTATION (H): ICD-10-CM

## 2022-12-01 DIAGNOSIS — D48.5 NEOPLASM OF UNCERTAIN BEHAVIOR OF SKIN: Primary | ICD-10-CM

## 2022-12-01 DIAGNOSIS — L73.9 FOLLICULITIS: ICD-10-CM

## 2022-12-01 DIAGNOSIS — L57.0 ACTINIC KERATOSIS: ICD-10-CM

## 2022-12-01 PROCEDURE — 11102 TANGNTL BX SKIN SINGLE LES: CPT | Mod: GC | Performed by: STUDENT IN AN ORGANIZED HEALTH CARE EDUCATION/TRAINING PROGRAM

## 2022-12-01 PROCEDURE — 88305 TISSUE EXAM BY PATHOLOGIST: CPT | Mod: TC | Performed by: STUDENT IN AN ORGANIZED HEALTH CARE EDUCATION/TRAINING PROGRAM

## 2022-12-01 PROCEDURE — 88305 TISSUE EXAM BY PATHOLOGIST: CPT | Mod: 26 | Performed by: DERMATOLOGY

## 2022-12-01 PROCEDURE — 99204 OFFICE O/P NEW MOD 45 MIN: CPT | Mod: 25 | Performed by: STUDENT IN AN ORGANIZED HEALTH CARE EDUCATION/TRAINING PROGRAM

## 2022-12-01 PROCEDURE — 17000 DESTRUCT PREMALG LESION: CPT | Mod: XS | Performed by: STUDENT IN AN ORGANIZED HEALTH CARE EDUCATION/TRAINING PROGRAM

## 2022-12-01 RX ORDER — KETOCONAZOLE 20 MG/ML
SHAMPOO TOPICAL
Qty: 120 ML | Refills: 11 | Status: SHIPPED | OUTPATIENT
Start: 2022-12-01 | End: 2023-12-20

## 2022-12-01 ASSESSMENT — PAIN SCALES - GENERAL: PAINLEVEL: NO PAIN (0)

## 2022-12-01 NOTE — PROGRESS NOTES
[facility] Dermatology Note  Encounter Date: Dec 1, 2022  Office Visit     Dermatology Problem List:  # Hx transplant (liver, 11/11/2019), on IMSP (mycophenolate 500mg BID, prednisone 5mg daily)   # NUB, L temporal scalp, s/p shave 12/1/22, DDx: ISK, r/o SCC.   # Folliculitis. BPO wash  # Versicolor. Ketoconazole wash  ____________________________________________    Assessment & Plan:     # Neoplasm of uncertain behavior, L temporal scalp. DDx: inflamed seborrheic keratosis vs rule out SCC given hx transplant on IMSP.   - Shave biopsy x 1 today (see procedure note below); daily vaseline and bandage until healed     # Lesion to monitor: R nasal bridge.   - Photo in chart; re-evaluate in 6 months     # Actinic keratosis, left ear. Discussed premalignant etiology as related to sun exposure.   - Cryotherapy x 1 today (see procedure note below)     # Folliculitis, trunk and extremities. Suspect long-term systemic steroid may be conributing factor.   - Start benzoyl peroxide 5% wash in the shower     # Tinea versicolor, upper chest.   - Start ketoconazole shampoo in shower as can also be used as a body wash      # Preventive skin care:   - Discussed continuing SPF 30-50+ for photoprotection; advised lotions are more effective than sprays however would prefer that he use sprays if lotion not available     Procedures Performed:   - Shave biopsy procedure note, location(s): L temporal scalp. After discussion of benefits and risks including but not limited to bleeding, infection, scar, incomplete removal, recurrence, and non-diagnostic biopsy, written consent and photographs were obtained. The area was cleaned with isopropyl alcohol. 0.5mL of 1% lidocaine with epinephrine was injected to obtain adequate anesthesia of lesion(s). Shave biopsy at site(s) performed. Hemostasis was achieved with aluminium chloride. Petrolatum ointment and a sterile dressing were applied. The patient tolerated the procedure and  no complications were noted. The patient was provided with verbal and written post care instructions.   - Cryotherapy procedure note, location(s): left ear. After verbal consent and discussion of risks and benefits including, but not limited to, dyspigmentation/scar, blister, and pain, 1 lesion(s) was(were) treated with 1-2 mm freeze border for 1-2 cycles with liquid nitrogen. Post cryotherapy instructions were provided.    Follow-up: 6 month(s) in-person for UBSE (will resume FBSE at 1 year)    Staff and Resident:     Nereida Mckeon MD PGY4    Patient was staffed with Dr. Omar Lyon   ____________________________________________    CC: Skin Check (Miller is here for a skin check and has spots of concern on his scalp ad back.) and Acne (Also wants to discuss acne on face, chest and arms.)    HPI:  Mr. Frandy Wokrman is a(n) 57 year old male who presents today as a new patient for baseline skin examination. He has a history of liver transplant (2019) and is on immunosuppressive medications (mycophenolate 500mg BID, prednisone 5mg daily). He has no personal history of skin cancer, but reports family history of skin cancer in dad (likely non-melanoma given history). He wears SPF 50 when outdoors, especially in summer months. Today, he reports 1 itchy lesion on the left temporal scalp, present for 1 month duration; non-tender and does not bleed spontaneously. He also reports diffuse itchiness on back where he also has acne-like bumps. Patient is otherwise feeling well, without additional skin concerns.    Labs Reviewed:  N/A    Physical Exam:  Vitals: There were no vitals taken for this visit.  SKIN: Total skin excluding the undergarment areas was performed. The exam included the head/face, neck, both arms, chest, back, abdomen, both legs, digits and/or nails.   - There is a pink waxy stuck on papule with surrounding erythema on the left temporal scalp   - There are superifical papules and pustules on the trunk (back)  primarily, as well as lower extremities) scattered in follicular distribution   - There is a small pink papule on the right nasal bridge, no specific findings concerning for malignancy on dermoscopy   - There are scattered faint plaques with thin soft scalp on the upper chest and neck   - No other lesions of concern on areas examined.     Medications:  Current Outpatient Medications   Medication     Acetaminophen (TYLENOL) 325 MG CAPS     ammonium lactate (AMLACTIN) 12 % external cream     ARIPiprazole (ABILIFY) 5 MG tablet     aspirin (SM ASPIRIN ADULT LOW STRENGTH) 81 MG EC tablet     BD VIKTORIA U/F 32G X 4 MM insulin pen needle     Continuous Blood Gluc  (FREESTYLE CLAUDIA 14 DAY READER) CECILIO     Continuous Blood Gluc Sensor (FREESTYLE CLAUDIA 2 SENSOR) MISC     cyanocobalamin (VITAMIN B-12) 1000 MCG tablet     empagliflozin (JARDIANCE) 10 MG TABS tablet     insulin aspart (NOVOLOG PEN) 100 UNIT/ML pen     insulin degludec (TRESIBA FLEXTOUCH) 100 UNIT/ML pen     lamiVUDine (EPIVIR) 100 MG tablet     methocarbamol (ROBAXIN) 750 MG tablet     metoprolol succinate ER (TOPROL XL) 25 MG 24 hr tablet     Multiple Vitamin (TAB-A-GLADIS) TABS     mycophenolate (GENERIC EQUIVALENT) 250 MG capsule     order for DME     pantoprazole (PROTONIX) 40 MG EC tablet     polyethylene glycol (MIRALAX) 17 g packet     predniSONE (DELTASONE) 5 MG tablet     rosuvastatin (CRESTOR) 5 MG tablet     tamsulosin (FLOMAX) 0.4 MG capsule     vitamin D3 (VITAMIN D3) 50 mcg (2000 units) tablet     ciclopirox (LOPROX) 0.77 % cream     lisinopril (ZESTRIL) 5 MG tablet     Current Facility-Administered Medications   Medication     aflibercept (EYLEA) injection prefilled syringe 2 mg     aflibercept (EYLEA) injection prefilled syringe 2 mg      Past Medical History:   Patient Active Problem List   Diagnosis     Bipolar affective disorder in remission (H)     Esophageal varices determined by endoscopy (H)     Brow ptosis     Paralytic lagophthalmos  of right upper eyelid     Erectile dysfunction due to diseases classified elsewhere     HCC (hepatocellular carcinoma) (H)     Equivocal stress echocardiogram     Type 2 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, unspecified whether long term insulin use (H)     Status post coronary angiogram     CAD (coronary artery disease)     Liver transplant recipient (H)     Status post liver transplantation (H)     Immunosuppressed status (H)     Benign essential hypertension     Malnutrition related to chronic disease (H)     Hypophosphatasia     Chronic kidney disease, stage 3a (H)     Malnutrition (H)     Anxiety     Mild recurrent major depression (H)     Anemia of chronic renal failure, stage 3a (H)     Rekha (H)     History of coronary artery disease     Dyslipidemia     Constipation, unspecified constipation type     Excessive sweating     Stage 3b chronic kidney disease (H)     Anemia of chronic renal failure, stage 3b (H)     NSTEMI (non-ST elevated myocardial infarction) (H)     Chest pain, unspecified type     Past Medical History:   Diagnosis Date     Anemia 2013     Arthritis      BPH (benign prostatic hyperplasia)      CAD (coronary artery disease) 4/1/2019     Cholelithiasis      Conductive hearing loss 08/16/2017     Depressive disorder 1986    Suffer effects throughout life     Gastroesophageal reflux disease 12/01/2014     HCC (hepatocellular carcinoma) (H) 1/22/2019     History of diabetic retinopathy 07/2018     HTN (hypertension)      HTN (hypertension) 11/20/2019     Hyperlipidemia      Liver cirrhosis secondary to ESTRADA (H)      Liver transplanted (H) 11/11/2019     Portal vein thrombosis 4/11/2019     Type II diabetes mellitus (H)

## 2022-12-01 NOTE — NURSING NOTE
Lidocaine-epinephrine 1-1:629368 % injection   1.5mL once for one use, starting 12/1/2022 ending 12/1/2022,  2mL disp, R-0, injection  Injected by Dr. Mckeon

## 2022-12-01 NOTE — NURSING NOTE
Dermatology Rooming Note    Frandy Workman's goals for this visit include:   Chief Complaint   Patient presents with     Skin Check     Miller is here for a skin check and has spots of concern on his scalp ad back.     Acne     Also wants to discuss acne on face, chest and arms.     David Hand, EMT

## 2022-12-01 NOTE — LETTER
12/1/2022       RE: Frandy Workman  [address] [address]     Dear Colleague,    Thank you for referring your patient, Frandy Workman, to the [facility] DERMATOLOGY [facility]IN[facility] [place] at [facility]. Please see a copy of my visit note below.    [facility] Dermatology Note  Encounter Date: Dec 1, 2022  Office Visit     Dermatology Problem List:  # Hx transplant (liver, 11/11/2019), on IMSP (mycophenolate 500mg BID, prednisone 5mg daily)   # NUB, L temporal scalp, s/p shave 12/1/22, DDx: ISK, r/o SCC.   # Folliculitis. BPO wash  # Versicolor. Ketoconazole wash  ____________________________________________    Assessment & Plan:     # Neoplasm of uncertain behavior, L temporal scalp. DDx: inflamed seborrheic keratosis vs rule out SCC given hx transplant on IMSP.   - Shave biopsy x 1 today (see procedure note below); daily vaseline and bandage until healed     # Lesion to monitor: R nasal bridge.   - Photo in chart; re-evaluate in 6 months     # Actinic keratosis, left ear. Discussed premalignant etiology as related to sun exposure.   - Cryotherapy x 1 today (see procedure note below)     # Folliculitis, trunk and extremities. Suspect long-term systemic steroid may be conributing factor.   - Start benzoyl peroxide 5% wash in the shower     # Tinea versicolor, upper chest.   - Start ketoconazole shampoo in shower as can also be used as a body wash      # Preventive skin care:   - Discussed continuing SPF 30-50+ for photoprotection; advised lotions are more effective than sprays however would prefer that he use sprays if lotion not available     Procedures Performed:   - Shave biopsy procedure note, location(s): L temporal scalp. After discussion of benefits and risks including but not limited to bleeding, infection, scar, incomplete removal, recurrence, and non-diagnostic biopsy, written consent and photographs were obtained.  The area was cleaned with isopropyl alcohol. 0.5mL of 1% lidocaine with epinephrine was injected to obtain adequate anesthesia of lesion(s). Shave biopsy at site(s) performed. Hemostasis was achieved with aluminium chloride. Petrolatum ointment and a sterile dressing were applied. The patient tolerated the procedure and no complications were noted. The patient was provided with verbal and written post care instructions.   - Cryotherapy procedure note, location(s): left ear. After verbal consent and discussion of risks and benefits including, but not limited to, dyspigmentation/scar, blister, and pain, 1 lesion(s) was(were) treated with 1-2 mm freeze border for 1-2 cycles with liquid nitrogen. Post cryotherapy instructions were provided.    Follow-up: 6 month(s) in-person for UBSE (will resume FBSE at 1 year)    Staff and Resident:     Nereida Mckeon MD PGY4    Patient was staffed with Dr. Omar Lyon   ____________________________________________    CC: Skin Check (Miller is here for a skin check and has spots of concern on his scalp ad back.) and Acne (Also wants to discuss acne on face, chest and arms.)    HPI:  Mr. Frandy Workman is a(n) 57 year old male who presents today as a new patient for baseline skin examination. He has a history of liver transplant (2019) and is on immunosuppressive medications (mycophenolate 500mg BID, prednisone 5mg daily). He has no personal history of skin cancer, but reports family history of skin cancer in dad (likely non-melanoma given history). He wears SPF 50 when outdoors, especially in summer months. Today, he reports 1 itchy lesion on the left temporal scalp, present for 1 month duration; non-tender and does not bleed spontaneously. He also reports diffuse itchiness on back where he also has acne-like bumps. Patient is otherwise feeling well, without additional skin concerns.    Labs Reviewed:  N/A    Physical Exam:  Vitals: There were no vitals taken for this visit.  SKIN:  Total skin excluding the undergarment areas was performed. The exam included the head/face, neck, both arms, chest, back, abdomen, both legs, digits and/or nails.   - There is a pink waxy stuck on papule with surrounding erythema on the left temporal scalp   - There are superifical papules and pustules on the trunk (back) primarily, as well as lower extremities) scattered in follicular distribution   - There is a small pink papule on the right nasal bridge, no specific findings concerning for malignancy on dermoscopy   - There are scattered faint plaques with thin soft scalp on the upper chest and neck   - No other lesions of concern on areas examined.     Medications:  Current Outpatient Medications   Medication     Acetaminophen (TYLENOL) 325 MG CAPS     ammonium lactate (AMLACTIN) 12 % external cream     ARIPiprazole (ABILIFY) 5 MG tablet     aspirin (SM ASPIRIN ADULT LOW STRENGTH) 81 MG EC tablet     BD VIKTORIA U/F 32G X 4 MM insulin pen needle     Continuous Blood Gluc  (FREESTYLE CLAUDIA 14 DAY READER) CECILIO     Continuous Blood Gluc Sensor (FREESTYLE CLAUDIA 2 SENSOR) MISC     cyanocobalamin (VITAMIN B-12) 1000 MCG tablet     empagliflozin (JARDIANCE) 10 MG TABS tablet     insulin aspart (NOVOLOG PEN) 100 UNIT/ML pen     insulin degludec (TRESIBA FLEXTOUCH) 100 UNIT/ML pen     lamiVUDine (EPIVIR) 100 MG tablet     methocarbamol (ROBAXIN) 750 MG tablet     metoprolol succinate ER (TOPROL XL) 25 MG 24 hr tablet     Multiple Vitamin (TAB-A-GLADIS) TABS     mycophenolate (GENERIC EQUIVALENT) 250 MG capsule     order for DME     pantoprazole (PROTONIX) 40 MG EC tablet     polyethylene glycol (MIRALAX) 17 g packet     predniSONE (DELTASONE) 5 MG tablet     rosuvastatin (CRESTOR) 5 MG tablet     tamsulosin (FLOMAX) 0.4 MG capsule     vitamin D3 (VITAMIN D3) 50 mcg (2000 units) tablet     ciclopirox (LOPROX) 0.77 % cream     lisinopril (ZESTRIL) 5 MG tablet     Current Facility-Administered Medications   Medication      aflibercept (EYLEA) injection prefilled syringe 2 mg     aflibercept (EYLEA) injection prefilled syringe 2 mg      Past Medical History:   Patient Active Problem List   Diagnosis     Bipolar affective disorder in remission (H)     Esophageal varices determined by endoscopy (H)     Brow ptosis     Paralytic lagophthalmos of right upper eyelid     Erectile dysfunction due to diseases classified elsewhere     HCC (hepatocellular carcinoma) (H)     Equivocal stress echocardiogram     Type 2 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, unspecified whether long term insulin use (H)     Status post coronary angiogram     CAD (coronary artery disease)     Liver transplant recipient (H)     Status post liver transplantation (H)     Immunosuppressed status (H)     Benign essential hypertension     Malnutrition related to chronic disease (H)     Hypophosphatasia     Chronic kidney disease, stage 3a (H)     Malnutrition (H)     Anxiety     Mild recurrent major depression (H)     Anemia of chronic renal failure, stage 3a (H)     Rekha (H)     History of coronary artery disease     Dyslipidemia     Constipation, unspecified constipation type     Excessive sweating     Stage 3b chronic kidney disease (H)     Anemia of chronic renal failure, stage 3b (H)     NSTEMI (non-ST elevated myocardial infarction) (H)     Chest pain, unspecified type     Past Medical History:   Diagnosis Date     Anemia 2013     Arthritis      BPH (benign prostatic hyperplasia)      CAD (coronary artery disease) 4/1/2019     Cholelithiasis      Conductive hearing loss 08/16/2017     Depressive disorder 1986    Suffer effects throughout life     Gastroesophageal reflux disease 12/01/2014     HCC (hepatocellular carcinoma) (H) 1/22/2019     History of diabetic retinopathy 07/2018     HTN (hypertension)      HTN (hypertension) 11/20/2019     Hyperlipidemia      Liver cirrhosis secondary to ESTRADA (H)      Liver  transplanted (H) 11/11/2019     Portal vein thrombosis 4/11/2019     Type II diabetes mellitus (H)          Attestation signed by Omar Lyon MD at 12/1/2022  9:55 AM:  I have personally examined this patient and agree with the resident doctor's documentation and plan of care. I have reviewed and amended the resident's note above. The documentation accurately reflects my clinical observations, diagnoses, treatment and follow-up plans. I was present for key portions of the procedure(s).       Omar Lyon MD  Dermatology Staff

## 2022-12-01 NOTE — PATIENT INSTRUCTIONS
Wound Care After a Biopsy    What is a skin biopsy?  A skin biopsy allows the doctor to examine a very small piece of tissue under the microscope to determine the diagnosis and the best treatment for the skin condition. A local anesthetic (numbing medicine)  is injected with a very small needle into the skin area to be tested. A small piece of skin is taken from the area. Sometimes a suture (stitch) is used.     What are the risks of a skin biopsy?  I will experience scar, bleeding, swelling, pain, crusting and redness. I may experience incomplete removal or recurrence. Risks of this procedure are excessive bleeding, bruising, infection, nerve damage, numbness, thick (hypertrophic or keloidal) scar and non-diagnostic biopsy.    How should I care for my wound for the first 24 hours?  Keep the wound dry and covered for 24 hours  If it bleeds, hold direct pressure on the area for 15 minutes. If bleeding does not stop then go to the emergency room  Avoid strenuous exercise the first 1-2 days or as your doctor instructs you    How should I care for the wound after 24 hours?  After 24 hours, remove the bandage  You may bathe or shower as normal  If you had a scalp biopsy, you can shampoo as usual and can use shower water to clean the biopsy site daily  Clean the wound twice a day with gentle soap and water  Do not scrub, be gentle  Apply white petroleum/Vaseline after cleaning the wound with a cotton swab or a clean finger, and keep the site covered with a Bandaid /bandage. Bandages are not necessary with a scalp biopsy  If you are unable to cover the site with a Bandaid /bandage, re-apply ointment 2-3 times a day to keep the site moist. Moisture will help with healing  Avoid strenuous activity for first 1-2 days  Avoid lakes, rivers, pools, and oceans until the stitches are removed or the site is healed    How do I clean my wound?  Wash hands thoroughly with soap or use hand  before all wound care  Clean the  wound with gentle soap and water  Apply white petroleum/Vaseline  to wound after it is clean  Replace the Bandaid /bandage to keep the wound covered for the first few days or as instructed by your doctor  If you had a scalp biopsy, warm shower water to the area on a daily basis should suffice    What should I use to clean my wound?   Cotton-tipped applicators (Qtips )  White petroleum jelly (Vaseline ). Use a clean new container and use Q-tips to apply.  Bandaids   as needed  Gentle soap     How should I care for my wound long term?  Do not get your wound dirty  Keep up with wound care for one week or until the area is healed.  A small scab will form and fall off by itself when the area is completely healed. The area will be red and will become pink in color as it heals. Sun protection is very important for how your scar will turn out. Sunscreen with an SPF 30 or greater is recommended once the area is healed.  If you have stitches, stitches need to be removed in 14 days. You may return to our clinic for this or you may have it done locally at your doctor s office.  You should have some soreness but it should be mild and slowly go away over several days. Talk to your doctor about using tylenol for pain,    When should I call my doctor?  If you have increased:   Pain or swelling  Pus or drainage (clear or slightly yellow drainage is ok)  Temperature over 100F  Spreading redness or warmth around wound    When will I hear about my results?  The biopsy results can take 2 weeks to come back.  Your results will automatically release to [employer] before your provider has even reviewed them.  The clinic will call you with the results, send you a HotClickVideo message, or have you schedule a follow-up clinic or phone time to discuss the results.  Contact our clinics if you do not hear from us in 2 weeks.    Who should I call with questions?  [facility]: [phone]  [facility]  [place]: [phone]  For urgent needs outside of business hours call the [facility] at [phone] and ask for the dermatology resident on call

## 2022-12-02 ENCOUNTER — TELEPHONE (OUTPATIENT)
Dept: OPHTHALMOLOGY | Facility: CLINIC | Age: 57
End: 2022-12-02

## 2022-12-02 LAB
PATH REPORT.COMMENTS IMP SPEC: ABNORMAL
PATH REPORT.COMMENTS IMP SPEC: ABNORMAL
PATH REPORT.COMMENTS IMP SPEC: YES
PATH REPORT.FINAL DX SPEC: ABNORMAL
PATH REPORT.GROSS SPEC: ABNORMAL
PATH REPORT.MICROSCOPIC SPEC OTHER STN: ABNORMAL
PATH REPORT.RELEVANT HX SPEC: ABNORMAL

## 2022-12-02 NOTE — TELEPHONE ENCOUNTER
I called patient to schedule surgery with Dr. Enriqueta Martin, I left a voicemail with callback # [phone]

## 2022-12-08 ENCOUNTER — TELEPHONE (OUTPATIENT)
Dept: OPHTHALMOLOGY | Facility: CLINIC | Age: 57
End: 2022-12-08

## 2022-12-08 DIAGNOSIS — E11.3313 TYPE 2 DIABETES MELLITUS WITH MODERATE NONPROLIFERATIVE RETINOPATHY OF BOTH EYES AND MACULAR EDEMA, UNSPECIFIED WHETHER LONG TERM INSULIN USE (H): Primary | ICD-10-CM

## 2022-12-08 NOTE — TELEPHONE ENCOUNTER
Patient is scheduled for surgery with Dr. Enriqueta Martin     Spoke with: Miller     Date of Surgery: 01/03/23     Location: [facility]:  [address] [place] [id number]     H&P will be completed at: [facility]     Post Op scheduled on 01/04, and 01/11     Surgery packet was mailed 12/08     Additional comments: Advised RN will call 1 - 3 business days prior with arrival time and instructions. Informed patient they will need an adult  and responsible adult to stay with for 24 hours following surgery

## 2022-12-12 NOTE — TELEPHONE ENCOUNTER
FUTURE VISIT INFORMATION      SURGERY INFORMATION:    Date: 1/3/2023    Location: [facility] OR    Surgeon:  Enriqueta Martin MD    Anesthesia Type:  MAC with Block    Procedure: PHACOEMULSIFICATION, CATARACT, WITH STANDARD INTRAOCULAR LENS IMPLANT INSERTION LEFT EYE    Consult: 18    RECORDS REQUESTED FROM:       Primary Care Provider: Nerissa Moe MD - [facility] Internal Med     Pertinent Medical History: CAD; CKD; Anemia     Most recent EKG+ Tracin/10/22- [facility]    Most recent ECHO: 3/15/22 - [facility]

## 2022-12-12 NOTE — RESULT ENCOUNTER NOTE
Called patient on 12/5, 12/7 and was not able to reach him, left voicemails.     Today, called patient a third time and spoke with him directly. Explained the pathology result in detail and its potential implications. Explained that this is a skin cancer and the recommended treatment is surgical intervention in the form of Mohs surgery. Answered patient questions. He was very grateful for the call.    Explained that he will next be contacted by derm-surg schedulers.     Derm-surg referral placed     RTC is already in place for clinic follow-up in 6 months, will follow-up post procedure at that time

## 2022-12-13 ENCOUNTER — TELEPHONE (OUTPATIENT)
Dept: DERMATOLOGY | Facility: CLINIC | Age: 57
End: 2022-12-13

## 2022-12-13 NOTE — TELEPHONE ENCOUNTER
Called patient to schedule surgery with Dr. Lyon    Date of Surgery: 02/15    Surgery type: mohs    Consult scheduled: Yes    Has patient had mohs with us before? No    Additional comments: pt prefers wednesdays      Tresa Aguayo on 12/13/2022 at 9:55 AM

## 2022-12-14 ENCOUNTER — ANCILLARY PROCEDURE (OUTPATIENT)
Dept: CT IMAGING | Facility: CLINIC | Age: 57
End: 2022-12-14
Attending: NURSE PRACTITIONER
Payer: MEDICARE

## 2022-12-14 ENCOUNTER — LAB (OUTPATIENT)
Dept: LAB | Facility: CLINIC | Age: 57
End: 2022-12-14
Payer: MEDICARE

## 2022-12-14 DIAGNOSIS — Z79.4 TYPE 2 DIABETES MELLITUS WITH HYPERGLYCEMIA, WITH LONG-TERM CURRENT USE OF INSULIN (H): ICD-10-CM

## 2022-12-14 DIAGNOSIS — E11.3293 TYPE 2 DIABETES MELLITUS WITH MILD NONPROLIFERATIVE RETINOPATHY OF BOTH EYES WITHOUT MACULAR EDEMA, UNSPECIFIED WHETHER LONG TERM INSULIN USE (H): ICD-10-CM

## 2022-12-14 DIAGNOSIS — C22.0 HCC (HEPATOCELLULAR CARCINOMA) (H): ICD-10-CM

## 2022-12-14 DIAGNOSIS — Z94.4 LIVER TRANSPLANT RECIPIENT (H): ICD-10-CM

## 2022-12-14 DIAGNOSIS — E11.65 TYPE 2 DIABETES MELLITUS WITH HYPERGLYCEMIA, WITH LONG-TERM CURRENT USE OF INSULIN (H): ICD-10-CM

## 2022-12-14 LAB
AFP SERPL-MCNC: 4.9 NG/ML
ALBUMIN SERPL BCG-MCNC: 4.7 G/DL (ref 3.5–5.2)
ALP SERPL-CCNC: 116 U/L (ref 40–129)
ALT SERPL W P-5'-P-CCNC: 30 U/L (ref 10–50)
ANION GAP SERPL CALCULATED.3IONS-SCNC: 14 MMOL/L (ref 7–15)
AST SERPL W P-5'-P-CCNC: 22 U/L (ref 10–50)
BASOPHILS # BLD AUTO: 0 10E3/UL (ref 0–0.2)
BASOPHILS NFR BLD AUTO: 1 %
BILIRUB SERPL-MCNC: 0.6 MG/DL
BUN SERPL-MCNC: 32.5 MG/DL (ref 6–20)
CALCIUM SERPL-MCNC: 10.2 MG/DL (ref 8.6–10)
CHLORIDE SERPL-SCNC: 104 MMOL/L (ref 98–107)
CHOLEST SERPL-MCNC: 220 MG/DL
CREAT BLD-MCNC: 1.9 MG/DL (ref 0.7–1.3)
CREAT SERPL-MCNC: 1.69 MG/DL (ref 0.67–1.17)
DEPRECATED HCO3 PLAS-SCNC: 24 MMOL/L (ref 22–29)
EOSINOPHIL # BLD AUTO: 0.2 10E3/UL (ref 0–0.7)
EOSINOPHIL NFR BLD AUTO: 2 %
ERYTHROCYTE [DISTWIDTH] IN BLOOD BY AUTOMATED COUNT: 13.9 % (ref 10–15)
GFR SERPL CREATININE-BSD FRML MDRD: 40 ML/MIN/1.73M2
GFR SERPL CREATININE-BSD FRML MDRD: 46 ML/MIN/1.73M2
GLUCOSE SERPL-MCNC: 96 MG/DL (ref 70–99)
HBA1C MFR BLD: 6.9 %
HCT VFR BLD AUTO: 41.6 % (ref 40–53)
HDLC SERPL-MCNC: 40 MG/DL
HGB BLD-MCNC: 13.5 G/DL (ref 13.3–17.7)
IMM GRANULOCYTES # BLD: 0.1 10E3/UL
IMM GRANULOCYTES NFR BLD: 1 %
LDLC SERPL CALC-MCNC: ABNORMAL MG/DL
LYMPHOCYTES # BLD AUTO: 1.2 10E3/UL (ref 0.8–5.3)
LYMPHOCYTES NFR BLD AUTO: 19 %
MCH RBC QN AUTO: 30.6 PG (ref 26.5–33)
MCHC RBC AUTO-ENTMCNC: 32.5 G/DL (ref 31.5–36.5)
MCV RBC AUTO: 94 FL (ref 78–100)
MONOCYTES # BLD AUTO: 0.5 10E3/UL (ref 0–1.3)
MONOCYTES NFR BLD AUTO: 8 %
NEUTROPHILS # BLD AUTO: 4.5 10E3/UL (ref 1.6–8.3)
NEUTROPHILS NFR BLD AUTO: 69 %
NONHDLC SERPL-MCNC: 180 MG/DL
NRBC # BLD AUTO: 0 10E3/UL
NRBC BLD AUTO-RTO: 0 /100
PLATELET # BLD AUTO: 160 10E3/UL (ref 150–450)
POTASSIUM SERPL-SCNC: 4 MMOL/L (ref 3.4–5.3)
PROT SERPL-MCNC: 7.5 G/DL (ref 6.4–8.3)
RBC # BLD AUTO: 4.41 10E6/UL (ref 4.4–5.9)
SODIUM SERPL-SCNC: 142 MMOL/L (ref 136–145)
TRIGL SERPL-MCNC: 498 MG/DL
WBC # BLD AUTO: 6.5 10E3/UL (ref 4–11)

## 2022-12-14 PROCEDURE — 82565 ASSAY OF CREATININE: CPT | Mod: 91 | Performed by: PATHOLOGY

## 2022-12-14 PROCEDURE — 71260 CT THORAX DX C+: CPT | Mod: GC | Performed by: RADIOLOGY

## 2022-12-14 PROCEDURE — 80061 LIPID PANEL: CPT | Performed by: PATHOLOGY

## 2022-12-14 PROCEDURE — 83036 HEMOGLOBIN GLYCOSYLATED A1C: CPT | Performed by: PHYSICIAN ASSISTANT

## 2022-12-14 PROCEDURE — 36415 COLL VENOUS BLD VENIPUNCTURE: CPT | Performed by: PATHOLOGY

## 2022-12-14 PROCEDURE — 82105 ALPHA-FETOPROTEIN SERUM: CPT | Performed by: NURSE PRACTITIONER

## 2022-12-14 PROCEDURE — 74178 CT ABD&PLV WO CNTR FLWD CNTR: CPT | Mod: GC | Performed by: RADIOLOGY

## 2022-12-14 PROCEDURE — 85025 COMPLETE CBC W/AUTO DIFF WBC: CPT | Performed by: PATHOLOGY

## 2022-12-14 PROCEDURE — 80053 COMPREHEN METABOLIC PANEL: CPT | Performed by: PATHOLOGY

## 2022-12-14 RX ORDER — IOPAMIDOL 755 MG/ML
100 INJECTION, SOLUTION INTRAVASCULAR ONCE
Status: COMPLETED | OUTPATIENT
Start: 2022-12-14 | End: 2022-12-14

## 2022-12-14 RX ADMIN — IOPAMIDOL 100 ML: 755 INJECTION, SOLUTION INTRAVASCULAR at 09:31

## 2022-12-14 NOTE — TELEPHONE ENCOUNTER
FUTURE VISIT INFORMATION      FUTURE VISIT INFORMATION:    Date: 2.8.23    Time: 2:15    Location: [facility]  REFERRAL INFORMATION:    Referring provider:  Linda    Referring providers clinic:  Derm    Reason for visit/diagnosis  SCCis left temporal scalp    RECORDS REQUESTED FROM:       Clinic name Comments Records Status Imaging Status   Derm 12.1.22  Path # [id number] Epic Epic

## 2022-12-15 ENCOUNTER — VIRTUAL VISIT (OUTPATIENT)
Dept: ONCOLOGY | Facility: CLINIC | Age: 57
End: 2022-12-15
Attending: NURSE PRACTITIONER
Payer: MEDICARE

## 2022-12-15 DIAGNOSIS — Z94.4 STATUS POST LIVER TRANSPLANTATION (H): ICD-10-CM

## 2022-12-15 DIAGNOSIS — C22.0 HCC (HEPATOCELLULAR CARCINOMA) (H): Primary | ICD-10-CM

## 2022-12-15 PROCEDURE — 99214 OFFICE O/P EST MOD 30 MIN: CPT | Mod: 95 | Performed by: INTERNAL MEDICINE

## 2022-12-15 NOTE — LETTER
12/15/2022         RE: Frandy Workman  [address] [facility] [address]        Dear Colleague,    Thank you for referring your patient, Frandy Workman, to the [facility]. Please see a copy of my visit note below.    Miller is a 57 year old who is being evaluated via a billable video visit.      How would you like to obtain your AVS? MyChart  If the video visit is dropped, the invitation should be resent by: Text to cell phone: [phone]  Will anyone else be joining your video visit? Iwona SALMON    Video-Visit Details    Video Start Time: 12:30    Type of service:  Video Visit    Video End Time:1:00    Originating Location (pt. Location): Home    Distant Location (provider location):  Off-site    Platform used for Video Visit: Odette Abraham returns today in follow-up of hepatocellular carcinoma.    He is 57 years old with a past history of cirrhosis related to Bergeron with the development of hepatocellular carcinoma in 12/18.  He had TACE of 2 lesions in January 2019 and went on to liver transplant in November 2019.  At the time of explant his 2 lesions were mostly necrotic without identifiable vascular invasion.  He returns today for surveillance for recurrence.    He tells me overall things are going relatively well for him.  He had a slip this fall with a back injury with some pain for about 2 weeks that is since resolved completely.  He has a new skin cancer on his forehead that is going to be resected by dermatology.  His appetite is good.  He feels sluggish which is his usual state.  He has no pain or adenopathy of which he is aware.    GENERAL: Healthy, alert and no distress  EYES: Eyes grossly normal to inspection.  No discharge or erythema, or obvious scleral/conjunctival abnormalities.  RESP: No audible wheeze, cough, or visible cyanosis.  No visible retractions or increased work of breathing.    SKIN: Visible skin clear. No significant rash,  abnormal pigmentation or lesions.  NEURO: Cranial nerves grossly intact.  Mentation and speech appropriate for age.  PSYCH: Mentation appears normal, affect normal/bright, judgement and insight intact, normal speech and appearance well-groomed.    I personally reviewed his CT scan and went over the results with him.  Shows the changes of his liver transplant with nothing to suggest recurrence of his cancer.  He has a new minor compression deformity in his spine.  He has extensive vascular changes as always.  His lab results show normal electrolytes and stable renal failure with a creatinine of 1.7.  His albumin, bilirubin and liver enzymes are normal.  His blood counts are normal.  His AFP is normal.    Assessment/plan: Hepatocellular carcinoma 3 years status post liver transplant currently without evidence of disease.  We will follow-up again with another surveillance evaluation with CT scanning in about 6 months.      Miguel Villarreal MD

## 2022-12-15 NOTE — NURSING NOTE
Patient declined individual allergy and medication review by support staff because patient denies any changes since echeck-in completion and states all information entered during echeck-in remains accurate.    Evelia Dailey VF

## 2022-12-15 NOTE — PROGRESS NOTES
Miller is a 57 year old who is being evaluated via a billable video visit.      How would you like to obtain your AVS? [employer]  If the video visit is dropped, the invitation should be resent by: Text to cell phone: [phone]  Will anyone else be joining your video visit? Iwona SALMON    Video-Visit Details    Video Start Time: 12:30    Type of service:  Video Visit    Video End Time:1:00    Originating Location (pt. Location): Home    Distant Location (provider location):  Off-site    Platform used for Video Visit: Odette Abraham returns today in follow-up of hepatocellular carcinoma.    He is 57 years old with a past history of cirrhosis related to Bergeron with the development of hepatocellular carcinoma in 12/18.  He had TACE of 2 lesions in January 2019 and went on to liver transplant in November 2019.  At the time of explant his 2 lesions were mostly necrotic without identifiable vascular invasion.  He returns today for surveillance for recurrence.    He tells me overall things are going relatively well for him.  He had a slip this fall with a back injury with some pain for about 2 weeks that is since resolved completely.  He has a new skin cancer on his forehead that is going to be resected by dermatology.  His appetite is good.  He feels sluggish which is his usual state.  He has no pain or adenopathy of which he is aware.    GENERAL: Healthy, alert and no distress  EYES: Eyes grossly normal to inspection.  No discharge or erythema, or obvious scleral/conjunctival abnormalities.  RESP: No audible wheeze, cough, or visible cyanosis.  No visible retractions or increased work of breathing.    SKIN: Visible skin clear. No significant rash, abnormal pigmentation or lesions.  NEURO: Cranial nerves grossly intact.  Mentation and speech appropriate for age.  PSYCH: Mentation appears normal, affect normal/bright, judgement and insight intact, normal speech and appearance well-groomed.    I personally  reviewed his CT scan and went over the results with him.  Shows the changes of his liver transplant with nothing to suggest recurrence of his cancer.  He has a new minor compression deformity in his spine.  He has extensive vascular changes as always.  His lab results show normal electrolytes and stable renal failure with a creatinine of 1.7.  His albumin, bilirubin and liver enzymes are normal.  His blood counts are normal.  His AFP is normal.    Assessment/plan: Hepatocellular carcinoma 3 years status post liver transplant currently without evidence of disease.  We will follow-up again with another surveillance evaluation with CT scanning in about 6 months.

## 2022-12-20 ENCOUNTER — VIRTUAL VISIT (OUTPATIENT)
Dept: BEHAVIORAL HEALTH | Facility: CLINIC | Age: 57
End: 2022-12-20
Payer: MEDICARE

## 2022-12-20 DIAGNOSIS — F31.31 BIPOLAR 1 DISORDER, DEPRESSED, MILD (H): Primary | ICD-10-CM

## 2022-12-20 DIAGNOSIS — Z94.4 STATUS POST LIVER TRANSPLANTATION (H): ICD-10-CM

## 2022-12-20 DIAGNOSIS — N18.31 CHRONIC KIDNEY DISEASE, STAGE 3A (H): ICD-10-CM

## 2022-12-20 DIAGNOSIS — Z94.4 LIVER REPLACED BY TRANSPLANT (H): Primary | ICD-10-CM

## 2022-12-20 PROCEDURE — 90832 PSYTX W PT 30 MINUTES: CPT | Mod: 95 | Performed by: PSYCHOLOGIST

## 2022-12-20 RX ORDER — PREDNISONE 5 MG/1
TABLET ORAL
Qty: 30 TABLET | Refills: 3 | Status: SHIPPED | OUTPATIENT
Start: 2022-12-20 | End: 2023-05-01

## 2022-12-20 NOTE — PROGRESS NOTES
[facility]: Integrated Behavioral Health  December 20, 2022        Behavioral Health Clinician Progress Note    Patient Name: Frandy Workman           Service Type: Video visit      Service Location:  Video visit      Session Start Time: 11:05  Session End Time:  11:19      Session Length: 16 - 37      Attendees: Patient    Visit Activities (Refresh list every visit): [facility] Only     Service Modality:  Video Visit:      Provider verified identity through the following two step process.  Patient provided:  Patient photo and Patient is known previously to provider    Telemedicine Visit: The patient's condition can be safely assessed and treated via synchronous audio and visual telemedicine encounter.      Reason for Telemedicine Visit: Services only offered telehealth    Originating Site (Patient Location): Patient's home    Distant Site (Provider Location): [facility]: [facility]    Consent:  The patient/guardian has verbally consented to: the potential risks and benefits of telemedicine (video visit) versus in person care; bill my insurance or make self-payment for services provided; and responsibility for payment of non-covered services.     Patient would like the video invitation sent by:  My Chart    Mode of Communication:  Video Conference via [employer]well    Distant Location (Provider):  On-site    As the provider I attest to compliance with applicable laws and regulations related to telemedicine.      Diagnostic Assessment Date:  12/03/2020  Treatment Plan Review Date:  12/29/2022  See Flowsheets for today's PHQ-9 and LIZZETTE-7 results  Previous PHQ-9:   PHQ-9 SCORE 9/8/2022 10/19/2022 11/21/2022   PHQ-9 Total Score MyChart 3 (Minimal depression) 2 (Minimal depression) 4 (Minimal depression)   PHQ-9 Total Score 3 2 4     Previous LIZZETTE-7:   LIZZETTE-7 SCORE 12/29/2020 4/7/2021 9/30/2021   Total Score 8 (mild anxiety) 9 (mild anxiety) -   Total Score 8 9 10       Extended Session (60+  "minutes): No  Interactive Complexity: No  Crisis: No    Treatment Objective(s) Addressed in This Session:  Target Behavior(s): disease management/lifestyle changes  related to adaptive approaches to managing anxious distress and mood difficulties    Depressed mood: Increase interest, pleasure in doing things.    Current Stressors / Issues:  [facility] met with Miller today for a follow-up. Brief session with Miller today, mostly to check-in, see if he needed additional support/guidance on treatment of his emotional health. States he is doing \"okay\" overall, though identifies a recent loss of a friend unexpectedly and continuing to struggle with lethargy and getting household things organized. Provided counseling, inquired what Miller needed today from our appointment, identifying his need for some support. Reviewed what he plans to do with his friend Davi who is coming to visit him for [place]. Talked briefly about it being okay asking for help, whether with a task at home or with talking about how he is doing on occasion. Miller agreed that spending time with Art and talking about loss/asking for assistance with a few things would benefit him greatly.     Answers for HPI/ROS submitted by the patient on 7/12/2022  If you checked off any problems, how difficult have these problems made it for you to do your work, take care of things at home, or get along with other people?: Somewhat difficult  PHQ9 TOTAL SCORE: 4    Answers for HPI/ROS submitted by the patient on 9/8/2022  If you checked off any problems, how difficult have these problems made it for you to do your work, take care of things at home, or get along with other people?: Somewhat difficult  PHQ9 TOTAL SCORE: 3    Answers for HPI/ROS submitted by the patient on 11/21/2022  If you checked off any problems, how difficult have these problems made it for you to do your work, take care of things at home, or get along with other people?: Very difficult  PHQ9 TOTAL SCORE: " 4          Progress on Treatment Objective(s) / Homework:  Stable - ACTION (Actively working towards change); Intervened by reinforcing change plan / affirming steps taken      Solution-Focused Therapy    Explore patterns in patient's relationships and discuss options for new behaviors.    Explore patterns in patient's actions and choices and discuss options for new behaviors.    Behavioral Activation    Discuss steps patient can take to become more involved in meaningful activity.    Identify barriers to these activities and explore possible solutions.      Answers for HPI/ROS submitted by the patient on 3/21/2022  If you checked off any problems, how difficult have these problems made it for you to do your work, take care of things at home, or get along with other people?: Not difficult at all  PHQ9 TOTAL SCORE: 6      Answers for HPI/ROS submitted by the patient on 2/8/2022  If you checked off any problems, how difficult have these problems made it for you to do your work, take care of things at home, or get along with other people?: Somewhat difficult  PHQ9 TOTAL SCORE: 4      Answers for HPI/ROS submitted by the patient on 4/7/2021   LIZZETTE 7 TOTAL SCORE: 9  If you checked off any problems, how difficult have these problems made it for you to do your work, take care of things at home, or get along with other people?: Very difficult  PHQ9 TOTAL SCORE: 7*    *No SI    Answers for HPI/ROS submitted by the patient on 10/13/2020   If you checked off any problems, how difficult have these problems made it for you to do your work, take care of things at home, or get along with other people?: Somewhat difficult  PHQ9 TOTAL SCORE: 8*  LIZZETTE 7 TOTAL SCORE: 6      *No SI     Answers for HPI/ROS submitted by the patient on 12/29/2020   If you checked off any problems, how difficult have these problems made it for you to do your work, take care of things at home, or get along with other people?: Somewhat difficult  PHQ9 TOTAL  SCORE: 5*  LIZZETTE 7 TOTAL SCORE: 8    *No SI     Answers for HPI/ROS submitted by the patient on 11/21/2022  If you checked off any problems, how difficult have these problems made it for you to do your work, take care of things at home, or get along with other people?: Very difficult  PHQ9 TOTAL SCORE: 4          Care Plan review completed: no    Medication Review:  No changes to current psychiatric medication(s)     Current Outpatient Medications   Medication     Acetaminophen (TYLENOL) 325 MG CAPS     ammonium lactate (AMLACTIN) 12 % external cream     ARIPiprazole (ABILIFY) 5 MG tablet     aspirin (SM ASPIRIN ADULT LOW STRENGTH) 81 MG EC tablet     BD VIKTORIA U/F 32G X 4 MM insulin pen needle     benzoyl peroxide 5 % external liquid     Continuous Blood Gluc  (FREESTYLE CLAUDIA 14 DAY READER) CECILIO     Continuous Blood Gluc Sensor (FREESTYLE CLAUDIA 2 SENSOR) MISC     cyanocobalamin (VITAMIN B-12) 1000 MCG tablet     empagliflozin (JARDIANCE) 10 MG TABS tablet     insulin aspart (NOVOLOG PEN) 100 UNIT/ML pen     insulin degludec (TRESIBA FLEXTOUCH) 100 UNIT/ML pen     ketoconazole (NIZORAL) 2 % external shampoo     lamiVUDine (EPIVIR) 100 MG tablet     methocarbamol (ROBAXIN) 750 MG tablet     metoprolol succinate ER (TOPROL XL) 25 MG 24 hr tablet     Multiple Vitamin (TAB-A-GLADIS) TABS     mycophenolate (GENERIC EQUIVALENT) 250 MG capsule     order for DME     pantoprazole (PROTONIX) 40 MG EC tablet     polyethylene glycol (MIRALAX) 17 g packet     predniSONE (DELTASONE) 5 MG tablet     rosuvastatin (CRESTOR) 5 MG tablet     tamsulosin (FLOMAX) 0.4 MG capsule     vitamin D3 (VITAMIN D3) 50 mcg (2000 units) tablet     Current Facility-Administered Medications   Medication     aflibercept (EYLEA) injection prefilled syringe 2 mg     aflibercept (EYLEA) injection prefilled syringe 2 mg         Medication Compliance:  Yes    Changes in Health Issues:   None reported    Chemical Use Review:   Substance Use: Chemical use  reviewed, no active concerns identified      Tobacco Use: No current tobacco use.         Assessment: Current Emotional / Mental Status (status of significant symptoms):  Risk status (Self / Other harm or suicidal ideation)  Patient denies a history of suicidal ideation, suicide attempts, self-injurious behavior, homicidal ideation, homicidal behavior and and other safety concerns     Patient denies current fears or concerns for personal safety.  Patient denies current or recent suicidal ideation or behaviors.  Patient denies current or recent homicidal ideation or behaviors.  Patient denies current or recent self injurious behavior or ideation.  Patient denies other safety concerns.     A safety and risk management plan has not been developed at this time, however patient was encouraged to call [place] [phone] should there be a change in any of these risk factors.    [place] Suicide Severity Rating Scale (Short Version)  [place] Suicide Severity Rating (Short Version) 11/10/2019 12/2/2019 12/10/2019 6/11/2020 7/1/2020 7/12/2021 1/10/2022   Over the past 2 weeks have you felt down, depressed, or hopeless? no yes yes no no no no   Over the past 2 weeks have you had thoughts of killing yourself? no yes no no no no no   Comments - lots of stressors, has thought about not taking meds - - - - -   Have you ever attempted to kill yourself? no no no no no no no   Q1 Wished to be Dead (Past Month) - other (see comments) no - - - -   Comments - why did i do this surgery - - - - -   Q2 Suicidal Thoughts (Past Month) - no no - - - -   Comments - wishes he wouldn't have gone through surgery - - - - -   Q3 Suicidal Thought Method - no no - - - -   Q4 Suicidal Intent without Specific Plan - no no - - - -   Q5 Suicide Intent with Specific Plan - no no - - - -   Q6 Suicide Behavior (Lifetime) - no no - - - -         Appearance:   Appropriate   Eye Contact:   Good   Psychomotor Behavior: TD evident   Attitude:   Cooperative   Interested Friendly Pleasant  Orientation:   All  Speech   Rate / Production: Normal/ Responsive   Volume:  Normal   Mood:    Depressed ; improving  Affect:    Appropriate    Thought Content:  Clear   Thought Form:  Goal Directed  Logical   Insight:    Good     Diagnoses:  1. Bipolar 1 disorder, depressed, mild (H)          Collateral Reports Completed:  Not Applicable    Plan: (Homework, other):  Continue with this writer for individual psychotherapy in one wks. He will continue to work on managing depression and anxiety through achievable behavioral activation ideas. Information about behavioral activation provided  today.  No CD issues.     Joseph Murillo, RED  December 20, 2022          ______________________________________________________________________                                            Individual Treatment Plan    Patient's Name: Frandy Workman  YOB: 1965    Date of Creation: 3/1/2022  Date Treatment Plan Last Reviewed/Revised: September 29, 2022    DSM5 Diagnoses: 296.51 Bipolar I Disorder Current or Most Recent Episode Depressed, Mild or 300.02 (F41.1) Generalized Anxiety Disorder  Psychosocial / Contextual Factors: Post Solid Organ Tx  PROMIS (reviewed every 90 days): 4    Referral / Collaboration:  Referral to another professional/service is not indicated at this time..    Anticipated number of session for this episode of care: 4-6  Anticipation frequency of session: Every other week  Anticipated Duration of each session: 38-52 minutes  Treatment plan will be reviewed in 90 days or when goals have been changed.       MeasurableTreatment Goal(s) related to diagnosis / functional impairment(s)  Goal 1: Patient will experience a reduction in depressive symptoms along with a corresponding increase in positive emotion and life satisfaction.      Objective #A (Patient Action)    Patient will Increase interest, engagement, and pleasure in doing things.  Status: Continued - Date(s):  "9/29/2022    Intervention(s)  Therapist will help patient identify pleasant and mastery oriented events that elicit positive, relaxed mood.    Objective #B  Patient will Decrease frequency and intensity of feeling down, depressed, hopeless.  Status: Continued - Date(s): 9/29/2022    Intervention(s)  Therapist will introduce patient to cognitive-behavioral and acceptance and commitment therapy topics aimed to help reduce depression and anxiety    Objective #C  Patient will Identify negative self-talk and behaviors: challenge core beliefs, myths, and actions  Improve concentration, focus, and mindfulness in daily activities .  Status: Continued - Date(s): 9/29/2022    Intervention(s)  Therapist will help patient identify and manage negative self-talk and automatic thoughts; introduce patient to cognitive distortions; help patient develop cognitive diffusion techniques      Goal 2: Patient will experience a reduction in anxious symptoms, along with a corresponding increase in relaxed emotional states and life satisfaction.      Objective #A (Patient Action)  Patient will use cognitive-behavioral and thought diffusion strategies identified in therapy to challenge anxious thoughts.    Status: Continued - Date(s): 9/29/2022    Intervention(s)  Therapist will utilize CBT and ACT ideas to help patient challenge anxious thoughts and reduce intensity/duration of anxious distress    Objective #B  Patient will use \"worry time\" each day for 15 minutes of scheduled worry and then defer obsessive or anxious thinking until the next structured worry time.    Status: Continued - Date(s): 9/29/2022    Intervention(s)  Therapist will teach patient how to effectively utilize worry time and/or thought logs/journals each day and incorporate more relaxation behaviors into their routine.    Objective #C  Patient will identify the stressors which contribute to feelings of anxiety  Patient will increase engagement in adaptive coping skills " and recreational activities, such as exercise and healthy socialization, to manage distress.  Status: Continued - Date(s): 9/29/2022    Intervention(s)  Therapist will help patient identify triggers/situational factors that contribute to anxiety and behavioral skills aimed to manage anxious distress.      Goal 3: Patiient will work toward adaptively managing bipolar-related depression and manic episodes      Objective #A (Patient Action)    Status: Continued - Date(s): COMPLETE    Patient will identify 2-3 signs or signals of emerging mood instability.    Intervention(s)  Therapist will provide educational materials on bipolar disorder and help identifying triggers for mood instability.    Objective #B  Patient will identify 2-3 strategies for more effectively managing Bipolar Disorder.    Status: Continued - Date(s): COMPLETE    Intervention(s)  Therapist will teach emotional recognition/identification. Skills aimed to effectively manage bipolar disorder.      Other Possible Therapeutic Intervention(s):    Psycho-education regarding mental health diagnoses and treatment options    Supportive Therapy    Provide affirmations, reflections, and establish working rapport    Emphasize and reflect on strength of therapeutic relationship    Skills training    Explore skills useful to client in current situation.    Skills include assertiveness, communication, conflict management, problem-solving, relaxation, etc.    Solution-Focused Therapy    Explore patterns in patient's relationships and discuss options for new behaviors.    Explore patterns in patient's actions and choices and discuss options for new behaviors.    Cognitive-behavioral Therapy    Discuss common cognitive distortions, identify them in patient's life.    Explore ways to challenge, replace, and act against these cognitions.    Acceptance and Commitment Therapy    Explore and identify important values in patient's life.    Discuss ways to commit to  behavioral activation around these values.    Psychodynamic psychotherapy    Discuss patient's emotional dynamics and issues and how they impact behaviors.    Explore patient's history of relationships and how they impact present behaviors.    Explore how to work with and make changes in these schemas and patterns.    Narrative Therapy    Explore the patient's story of his/her life from his/her perspective.    Explore alternate ways of understanding their experience, identifying exceptions, developing new themes.    Interpersonal Psychotherapy    Explore patterns in relationships that are effective or ineffective at helping patient reach their goals, find satisfying experience.    Discuss new patterns or behaviors to engage in for improved social functioning.    Behavioral Activation    Discuss steps patient can take to become more involved in meaningful activity.    Identify barriers to these activities and explore possible solutions.    Mindfulness-Based Strategies    Discuss skills based on development and application of mindfulness.    Skills drawn from compassion-focused therapy, dialectical behavior therapy, mindfulness-based stress reduction, mindfulness-based cognitive therapy, etc.      Patient has reviewed and agreed to the above plan.      Joseph Murillo Psy.D, LP   Behavioral Health Clinician   -[facility]     9/29/2022  Answers for HPI/ROS submitted by the patient on 10/19/2022  If you checked off any problems, how difficult have these problems made it for you to do your work, take care of things at home, or get along with other people?: Somewhat difficult  PHQ9 TOTAL SCORE: 2

## 2022-12-21 ENCOUNTER — OFFICE VISIT (OUTPATIENT)
Dept: OPHTHALMOLOGY | Facility: CLINIC | Age: 57
End: 2022-12-21
Attending: OPHTHALMOLOGY
Payer: MEDICARE

## 2022-12-21 DIAGNOSIS — E11.3313 TYPE 2 DIABETES MELLITUS WITH MODERATE NONPROLIFERATIVE RETINOPATHY OF BOTH EYES AND MACULAR EDEMA, UNSPECIFIED WHETHER LONG TERM INSULIN USE (H): ICD-10-CM

## 2022-12-21 PROCEDURE — 67028 INJECTION EYE DRUG: CPT | Mod: LT | Performed by: OPHTHALMOLOGY

## 2022-12-21 PROCEDURE — 92134 CPTRZ OPH DX IMG PST SGM RTA: CPT | Performed by: OPHTHALMOLOGY

## 2022-12-21 PROCEDURE — 250N000011 HC RX IP 250 OP 636: Performed by: OPHTHALMOLOGY

## 2022-12-21 PROCEDURE — G0463 HOSPITAL OUTPT CLINIC VISIT: HCPCS | Mod: 25

## 2022-12-21 RX ADMIN — AFLIBERCEPT 2 MG: 40 INJECTION, SOLUTION INTRAVITREAL at 09:18

## 2022-12-21 ASSESSMENT — REFRACTION_WEARINGRX
OD_SPHERE: -7.00
OD_CYLINDER: +0.75
SPECS_TYPE: PAL
OS_CYLINDER: +0.50
OS_AXIS: 044
OD_ADD: +2.00
OD_AXIS: 131
OS_SPHERE: -6.75
OS_ADD: +2.00

## 2022-12-21 ASSESSMENT — VISUAL ACUITY
OS_CC+: -1
OD_CC: 20/30
METHOD: SNELLEN - LINEAR
OS_CC: 20/30
CORRECTION_TYPE: GLASSES
OD_CC+: -2

## 2022-12-21 ASSESSMENT — CONF VISUAL FIELD
OS_INFERIOR_NASAL_RESTRICTION: 0
OS_SUPERIOR_TEMPORAL_RESTRICTION: 0
METHOD: COUNTING FINGERS
OS_INFERIOR_TEMPORAL_RESTRICTION: 0
OD_SUPERIOR_NASAL_RESTRICTION: 0
OD_NORMAL: 1
OD_INFERIOR_NASAL_RESTRICTION: 0
OS_SUPERIOR_NASAL_RESTRICTION: 0
OD_INFERIOR_TEMPORAL_RESTRICTION: 0
OD_SUPERIOR_TEMPORAL_RESTRICTION: 0
OS_NORMAL: 1

## 2022-12-21 ASSESSMENT — TONOMETRY
OD_IOP_MMHG: 16
OS_IOP_MMHG: 15
IOP_METHOD: TONOPEN

## 2022-12-21 ASSESSMENT — CUP TO DISC RATIO
OS_RATIO: 0.3
OD_RATIO: 0.2

## 2022-12-21 ASSESSMENT — SLIT LAMP EXAM - LIDS
COMMENTS: NORMAL
COMMENTS: SMALL PAPILLOMA ALONG LL MARGIN, NON CONCERNING

## 2022-12-21 ASSESSMENT — EXTERNAL EXAM - LEFT EYE: OS_EXAM: NORMAL

## 2022-12-21 ASSESSMENT — EXTERNAL EXAM - RIGHT EYE: OD_EXAM: NORMAL

## 2022-12-21 NOTE — PROGRESS NOTES
CC:  Follow up Diabetic macular edema and diabetic retinopathy  Here for retina dilated exam 12/21/22 and inj left eye      HPI: Frandy Workman is a 57 year old patient with history of Diabetes mellitus for 24 ys . Patient on insulin.      Interval History: Eyes feel better. Not having the floaters that he used to in the left eye,     Retinal Imaging:  OCT 12/21/22   RE: central Diabetic macular edema, slightly worse, VMA present, ok foveal contour,   LE: improved Diabetic macular edema, mild Epiretinal membrane, normal foveal contour;     fluorescein angiography 09/28/22  limited by oral dye. No obvious NVE, but peripheral leakage and capillary non perfusion; neovascularization elsewhere left eye probably not seen because of  Vitreous hemorrhage and oral fluorescein    Optos consistent with clinical exam    Assessment & Plan:    #T2DM   - HbA1c 6.0% (1/2022)  - Blood pressure (<120/80) and blood glucose (HbA1c <7.0, ~6.5 today) control discussed with patient. Patient advised that failure to adequately control each may lead to vision loss. The patient expressed understanding.    # Diabetic macular edema both eyes   - status post avastin x 4. Switch to Eylea 4/24/19 with improvement of Diabetic macular edema   - mild DME each eye today (right eye slightly worse, left eye improving with injections)   - will watch closely right eye   - left eye plan for eylea    # Proliferative diabetic retinopathy both eyes   # pre-proliferative diabetic retinopathy right eye    # new vitreous hemorrhage left eye 10/12/22   Status post  Eylea inj left eye  Status post  Panretinal laser photocoagulation (PRP) 9.28.22 left eye and Status post scatter PRP right eye 11/02/22     #. Senile nuclear sclerosis, bilateral   visually significant both eyes  Glare Testing (BAT)     Off Low Medium High   Right 20/25 20/25 20/30 20/40   Left 20/25 20/40 20/50 20/60+1        Visually significant:YES  Glare: Positive   Reports impacts ADL    Dilation: 5 mm  Iris expansion: yes; likely  Pseudoexfoliation: NO  Trypan Blue: yes  Phacodonesis: No  Cornea guttae: rare  Aim for: -0.5  Start artificial tears twice a day and as needed    Anesthesia: peribulbar block  Surgical time: 60 min  Candidate for toric IOL:   Anticoagulants: aspirin  Might need modified block  Alpha blockers/Flomax: YES Patient on flomax   I reviewed the indications, risks, benefits, and alternatives of the proposed surgical procedure. We discussed at length cataracts and the effect of the cataracts on vision.   We discussed the fact that modern cataract surgery is usually successful at alleviating symptoms of glare when the cataract is the causative factor. Other risks were discussed with patient including, but not limited to, failure to improve vision or further loss of vision,  and need for additional surgery, bleeding, infection, loss of vision and the remote possibility of complications of anesthesia. 1:1000 risk of infection/bleed/loss of eye; 1:100 risk of RD and need for further surgery. Patient agreed to proceed with surgery.  I provided multiple opportunities for the questions, answered all questions to the best of my ability, and confirmed that my answers and my discussion were understood.     Plan for Cataract extraction intraocular lens left eye first    # Dry eye syndrome   Left eye worse than right eye   warm compresses and artificial tears  As needed  -punctal plugs previously left eye - reports this is better     # Status post liver transplant   - tx 11/2019   - severe anemia; s/p transfusion - anemia much improved    - being seen by his primary team    PLAN:  Plan for  Eylea inj 12/21/22   Right eye: watch closely. Consider inj if Diabetic macular edema worsen or prior to Cataract extraction intraocular lens    Left eye: follow up approximately 6 weeks with Optical Coherence Tomography. Consider T&E  Patient scheduled for Cataract extraction intraocular lens left eye    Will follow up post-op  Retina detachment precautions were discussed with the patient (presence or increased in flashes, floaters or a curtain in the visual field) and was asked to return if any of the those occur     ~~~~~~~~~~~~~~~~~~~~~~~~~~~~~~~~~~   Complete documentation of historical and exam elements from today's encounter can be found in the full encounter summary report (not reduplicated in this progress note).  I personally obtained the chief complaint(s) and history of present illness.  I confirmed and edited as necessary the review of systems, past medical/surgical history, family history, social history, and examination findings as documented by others; and I examined the patient myself.  I personally reviewed the relevant tests, images, and reports as documented above.  I formulated and edited as necessary the assessment and plan and discussed the findings and management plan with the patient and family and I was present for critical portions of the procedure carried out by the resident/fellow and immediately available for the entire procedure    Enriqueta Martin MD   of Ophthalmology.  Retina Service   Department of Ophthalmology and Visual Neurosciences   [facility]  Phone: [phone]   Fax: [phone]

## 2022-12-21 NOTE — NURSING NOTE
Chief Complaints and History of Present Illnesses   Patient presents with     Diabetic Retinopathy Follow Up     Chief Complaint(s) and History of Present Illness(es)     Diabetic Retinopathy Follow Up            Laterality: both eyes    Onset: gradual    Course: stable    Associated symptoms: itching.  Negative for eye pain, flashes and floaters    Treatments tried: artificial tears          Comments    Here for moderate NPDR with DME both eyes. Vision is about the same since last visit. Some itching. No flashes or floaters. Uses lubricating drops as needed. No eye pain.    LBS: 168 this morning  Lab Results       Component                Value               Date                       A1C                      6.9                 12/14/2022                 A1C                      6.0                 01/10/2022                 A1C                      6.8                 07/13/2021                 A1C                      6.0                 12/30/2020                 A1C                      6.3                 06/13/2020                 A1C                      6.6                 08/08/2018                 A1C                      6.5                 06/09/2017                 A1C                      7.8                 10/25/2016              Reji Gomez COT 8:36 AM December 21, 2022

## 2022-12-22 RX ORDER — MULTIVITAMIN WITH FOLIC ACID 400 MCG
TABLET ORAL
Qty: 90 TABLET | Refills: 0 | Status: SHIPPED | OUTPATIENT
Start: 2022-12-22 | End: 2023-03-29

## 2022-12-22 NOTE — TELEPHONE ENCOUNTER
[facility]  TABS      Last Written Prescription Date:  9/2/22  Last Fill Quantity: 90,   # refills: 0  Last Office Visit : 12/7/21  Future Office visit:  none    Routing refill request to provider for review/approval because:  Overdue for office visit  Has already been given 90d grf

## 2022-12-27 ENCOUNTER — PRE VISIT (OUTPATIENT)
Dept: SURGERY | Facility: CLINIC | Age: 57
End: 2022-12-27

## 2022-12-27 ENCOUNTER — ANESTHESIA EVENT (OUTPATIENT)
Dept: SURGERY | Facility: AMBULATORY SURGERY CENTER | Age: 57
End: 2022-12-27
Payer: MEDICARE

## 2022-12-27 ENCOUNTER — OFFICE VISIT (OUTPATIENT)
Dept: SURGERY | Facility: CLINIC | Age: 57
End: 2022-12-27
Payer: MEDICARE

## 2022-12-27 VITALS
DIASTOLIC BLOOD PRESSURE: 82 MMHG | WEIGHT: 200.7 LBS | SYSTOLIC BLOOD PRESSURE: 154 MMHG | BODY MASS INDEX: 29.73 KG/M2 | OXYGEN SATURATION: 100 % | HEART RATE: 89 BPM | HEIGHT: 69 IN | TEMPERATURE: 98.1 F | RESPIRATION RATE: 16 BRPM

## 2022-12-27 DIAGNOSIS — Z01.818 PRE-OP EVALUATION: Primary | ICD-10-CM

## 2022-12-27 PROCEDURE — 99214 OFFICE O/P EST MOD 30 MIN: CPT | Performed by: PHYSICIAN ASSISTANT

## 2022-12-27 ASSESSMENT — LIFESTYLE VARIABLES: TOBACCO_USE: 0

## 2022-12-27 ASSESSMENT — ENCOUNTER SYMPTOMS: SEIZURES: 0

## 2022-12-27 ASSESSMENT — PAIN SCALES - GENERAL: PAINLEVEL: NO PAIN (0)

## 2022-12-27 NOTE — PATIENT INSTRUCTIONS
Preparing for Your Surgery      Name:  Frandy Workman   MRN:  [id number]   :  1965   Today's Date:  2022         Arriving for surgery:  Surgery date:  1/3/23  Arrival time:  6:30 am    Restrictions due to COVID 19:    2 visitors may accompany each patient  Visitors may wait for patient in the Surgery Center Waiting room  All visitors must wear a mask and socially distance        parking is available for anyone with mobility limitations or disabilities. (Monday- Friday 7 am- 5 pm)    Please come to:    [facility]  [address] [place] [phone]    Check in on the 5th floor, [facility].    What can I eat or drink?    -  You may eat and drink normally until 8 hours before arrival time  (Until 10:30 pm on 23 - the night before surgery)  -  You may have clear liquids until 2 hours before arrival time  (Until 4:30 am on 1/3/23 - the morning of surgery)    Examples of clear liquids:  Water  Clear broth  Juices (apple, white grape, white cranberry  and cider) without pulp  Noncarbonated, powder based beverages  (lemonade and Kye-Aid)  Sodas (Sprite, 7-Up, ginger ale and seltzer)  Coffee or tea (without milk or cream)  Gatorade    --No alcohol for at least 24 hours before surgery    Which medicines can I take?    Hold Multivitamins for 7 days before surgery.  Hold Supplements for 7 days before surgery.  Hold Ibuprofen (Advil, Motrin) for 1 day before surgery--unless otherwise directed by surgeon.  Hold Naproxen (Aleve) for 4 days before surgery.    -  DO NOT take the following medications the day of surgery:  Aspirin - do not take before surgery, but you may take it after you get back home after surgery  Vitamin B-12  Jardiance - stop 3 days before surgery  Insulin Aspart (Novolog)  Multivitamin - stop 7 days before surgery  Polyethylene glycol (Miralax)  Vitamin D    -  PLEASE TAKE the following medications the day of surgery   Tylenol if  needed  Aripiprazole (Abilify)  Take 80% of your usual dose of Insulin Degludec (Tresiba) - take 22 units instead of 28 units the morning of surgery  Lamivudine (Epivir)  Methocarbamol (Robaxin) if needed  Metoprolol (Toprol)  Mycophenolate  Pantoprazole (Protonix)  Prednisone  Rosuvastatin (Crestor)  Tamsulosin (Flomax)    How do I prepare myself?  - Please take 2 showers before surgery using Scrubcare or Hibiclens soap.    Use this soap only from the neck to your toes.     Leave the soap on your skin for one minute--then rinse thoroughly.      You may use your own shampoo and conditioner; no other hair products.   - Please remove all jewelry and body piercings.  - No lotions, deodorants or fragrance.  - No makeup or fingernail polish.   - Bring your ID and insurance card.    -If you have a Deep Brain Stimulator, a Spinal Cord Stimulator or any implanted Neuro device you must bring the remote to the Surgery Center        ALL PATIENTS ARE REQUIRED TO HAVE A RESPONSIBLE ADULT TO DRIVE AND BE IN ATTENDANCE WITH THEM FOR 24 HOURS FOLLOWING SURGERY       Covid testing policy as of 12/06/2022  Your surgeon will notify and schedule you for a COVID test if one is needed before surgery--please direct any questions or COVID symptoms to your surgeon      Questions or Concerns:    -For questions regarding the day of surgery please contact the Ambulatory Surgery Center at [phone].    -If you have health changes between today and your surgery please contact your surgeon.     For questions after surgery please call your surgeons office.

## 2022-12-27 NOTE — H&P
Pre-Operative H & P     CC:  Preoperative exam to assess for increased cardiopulmonary risk while undergoing surgery and anesthesia.    Date of Encounter: 12/27/2022  Primary Care Physician:  Mohamud Tavarez     Reason for visit:   Encounter Diagnosis   Name Primary?     Pre-op evaluation Yes       HPI  Frandy Workman is a 57 year old male who presents for pre-operative H & P in preparation for  Procedure Information     Case: [id number] Date/Time: 01/03/23 0800    Procedure: PHACOEMULSIFICATION, CATARACT, WITH STANDARD INTRAOCULAR LENS IMPLANT INSERTION LEFT EYE (Left: Eye)    Anesthesia type: MAC with Block    Diagnosis: Age-related nuclear cataract of left eye [H25.12]    Pre-op diagnosis: Age-related nuclear cataract of left eye [H25.12]    Location: [place] [address] / [facility] [place]    Providers: Enriqueta Martin MD          Patient is being evaluated for comorbid conditions of hypertension, dyslipidemia, CAD s/p CABG, anemia, diabetes, GERD, s/p liver transplant, depression, arthritis    Mr. Workman has a history of diabetic retinopathy. He follows with Dr. Martin. He was recently found to have cataract and is now scheduled for the above procedure.     History is obtained from the patient and chart review    Hx of abnormal bleeding or anti-platelet use: ASA 81      Past Medical History  Past Medical History:   Diagnosis Date     Anemia 2013     Arthritis      BPH (benign prostatic hyperplasia)      CAD (coronary artery disease) 4/1/2019     Cholelithiasis      Conductive hearing loss 08/16/2017     Depressive disorder 1986    Suffer effects throughout life     Gastroesophageal reflux disease 12/01/2014     HCC (hepatocellular carcinoma) (H) 1/22/2019     History of diabetic retinopathy 07/2018     HTN (hypertension)      HTN (hypertension) 11/20/2019     Hyperlipidemia      Liver cirrhosis secondary to ESTRADA (H)      Liver transplanted (H) 11/11/2019      Portal vein thrombosis 4/11/2019     Type II diabetes mellitus (H)        Past Surgical History  Past Surgical History:   Procedure Laterality Date     BYPASS GRAFT ARTERY CORONARY N/A 7/14/2021    Procedure: median sternotomy, on cardiopulmonary bypass, CORONARY ARTERY BYPASS GRAFT (CABG) x2 with left greater saphenous vein endoscopic harvest and left internal mammery artery harvest;  Surgeon: Tom Zapata MD;  Location: [facility] OR     COLONOSCOPY      2015     COLONOSCOPY N/A 12/6/2019    Procedure: COLONOSCOPY, WITH POLYPECTOMY AND BIOPSY;  Surgeon: Adam Morton MD;  [facility]     CV CENTRAL VENOUS CATHETER PLACEMENT N/A 7/12/2021    Procedure: Central Venous Catheter Placement;  Surgeon: Fermin Polnaco MD;  Location:  HEART CARDIAC CATH LAB     CV CORONARY ANGIOGRAM N/A 7/12/2021    Procedure: Coronary Angiogram;  Surgeon: Fermin Polanco MD;  Location:  HEART CARDIAC CATH LAB     CV HEART CATHETERIZATION WITH POSSIBLE INTERVENTION N/A 2/26/2019    Procedure: CORS;  Surgeon: Jagdish Hoyt MD;  Location:  HEART CARDIAC CATH LAB     CV INTRA AORTIC BALLOON N/A 7/12/2021    Procedure: Intra Aortic Balloon Pump Insertion;  Surgeon: Fermin Polanco MD;  Location: [facility] CARDIAC CATH LAB     ESOPHAGOSCOPY, GASTROSCOPY, DUODENOSCOPY (EGD), COMBINED N/A 11/17/2016    Procedure: COMBINED ESOPHAGOSCOPY, GASTROSCOPY, DUODENOSCOPY (EGD);  Surgeon: Santi Rosas MD;  Location:  GI     ESOPHAGOSCOPY, GASTROSCOPY, DUODENOSCOPY (EGD), COMBINED N/A 11/17/2017    Procedure: COMBINED ESOPHAGOSCOPY, GASTROSCOPY, DUODENOSCOPY (EGD);  EGD;  Surgeon: Santi Rosas MD;  Location:  GI     ESOPHAGOSCOPY, GASTROSCOPY, DUODENOSCOPY (EGD), COMBINED N/A 12/28/2018    Procedure: EGD;  Surgeon: Santi Rosas MD;  Location:  OR     ESOPHAGOSCOPY, GASTROSCOPY, DUODENOSCOPY (EGD), COMBINED N/A 12/6/2019    Procedure:  ESOPHAGOGASTRODUODENOSCOPY, WITH BIOPSY;  Surgeon: Adam Morton MD;  Location:  GI     ESOPHAGOSCOPY, GASTROSCOPY, DUODENOSCOPY (EGD), COMBINED N/A 2020    Procedure: ESOPHAGOGASTRODUODENOSCOPY (EGD);  Surgeon: Santi Rosas MD;  Location:  GI     HEAD & NECK SURGERY      2017 at [facility].      IMPLANT GOLD WEIGHT EYELID Right 2017    Procedure: IMPLANT WEIGHT EYELID;  Right Upper Eyelid Weight, right tarsal strip lower eyelid;  Surgeon: Milana Malave MD;  [facility] OR     IR CHEMO EMBOLIZATION  2019     KNEE SURGERY Left      ORTHOPEDIC SURGERY       PAROTIDECTOMY, RADICAL NECK DISSECTION Right 2017    Procedure: PAROTIDECTOMY, RADICAL NECK DISSECTION;  Right Superfacial Parotidectomy , Facial nerve repair. with [facility] facial nerve monitor.;  Surgeon: Asiya Morgan MD;  Location: [facility] OR     PICC INSERTION Left 2017    4fr SL BioFlo PICC, 44cm in the L basilic vein w/ tip in the low SVC     RETURN LIVER TRANSPLANT N/A 2019    Procedure: Exploratory laparotomy, hematoma evacuation, abdominal washout;  Surgeon: Александр Ramos MD;  Location: [facility] OR     TRANSPLANT LIVER RECIPIENT  DONOR N/A 2019    Procedure: TRANSPLANT, LIVER, RECIPIENT,  DONOR;  Surgeon: Александр Ramos MD;  Location: [facility] OR     VASCULAR SURGERY         Prior to Admission Medications  Current Outpatient Medications   Medication Sig Dispense Refill     Acetaminophen (TYLENOL) 325 MG CAPS Take 325-650 mg by mouth every 4 hours as needed (pain, fever) 100 capsule 1     ammonium lactate (AMLACTIN) 12 % external cream Apply topically 2 times daily To feet. 140 g 5     aspirin (SM ASPIRIN ADULT LOW STRENGTH) 81 MG EC tablet Take 2 tablets (162 mg) by mouth daily (Patient taking differently: Take 162 mg by mouth At Bedtime) 180 tablet 1     benzoyl peroxide 5 % external liquid Use topically in showers as a body wash 226 g 11     cyanocobalamin (VITAMIN B-12) 1000 MCG  tablet TAKE 1 TABLET (1,000 MCG) BY MOUTH DAILY (Patient taking differently: Take 1,000 mcg by mouth every morning) 30 tablet 11     empagliflozin (JARDIANCE) 10 MG TABS tablet Take 1 tablet (10 mg) by mouth daily (Patient taking differently: Take 10 mg by mouth every morning) 90 tablet 3     insulin aspart (NOVOLOG PEN) 100 UNIT/ML pen Inject 3-8 Units Subcutaneous 4 times daily (with meals and nightly) 1unit : 15 g carb before meals.  Also add 1 unit : 50 mg/dl >180 before meals and at bedtime. 30 mL 3     insulin degludec (TRESIBA FLEXTOUCH) 100 UNIT/ML pen Inject 28 units subcutaneous once daily (Patient taking differently: Inject 28 Units Subcutaneous every morning Inject 28 units subcutaneous once daily) 25 mL 3     ketoconazole (NIZORAL) 2 % external shampoo Apply thin layer topically to scalp in shower (leave on 5 min prior to rinse); may also use as a body wash 120 mL 11     lamiVUDine (EPIVIR) 100 MG tablet Take 1 tablet (100 mg) by mouth daily (Patient taking differently: Take 100 mg by mouth every morning) 90 tablet 3     methocarbamol (ROBAXIN) 750 MG tablet Take 1 tablet (750 mg) by mouth 3 times daily as needed for muscle spasms (sternal pain) 60 tablet 0     metoprolol succinate ER (TOPROL XL) 25 MG 24 hr tablet Take 0.5 tablets (12.5 mg) by mouth daily (Patient taking differently: Take 12.5 mg by mouth At Bedtime) 45 tablet 1     Multiple Vitamin (TAB-A-GLADIS) TABS TAKE ONE TABLET BY MOUTH ONCE DAILY. FOR ADDITIONAL REFILLS, PLEASE SCHEDULE A FOLLOW-UP APPOINTMENT -[phone] (Patient taking differently: every morning) 90 tablet 0     mycophenolate (GENERIC EQUIVALENT) 250 MG capsule Take 2 capsules (500 mg) by mouth every 12 hours (Patient taking differently: Take 500 mg by mouth 2 times daily) 120 capsule 11     pantoprazole (PROTONIX) 40 MG EC tablet Take 1 tablet (40 mg) by mouth daily before breakfast (Patient taking differently: Take 40 mg by mouth every morning) 90 tablet 0      polyethylene glycol (MIRALAX) 17 g packet Take 1 packet by mouth daily       predniSONE (DELTASONE) 5 MG tablet TAKE ONE TABLET BY MOUTH ONCE DAILY (Patient taking differently: Take 5 mg by mouth every morning) 30 tablet 3     rosuvastatin (CRESTOR) 5 MG tablet Take 1 tablet (5 mg) by mouth daily (Patient taking differently: Take 5 mg by mouth At Bedtime) 90 tablet 3     tamsulosin (FLOMAX) 0.4 MG capsule Take 1 capsule (0.4 mg) by mouth daily (Patient taking differently: Take 0.4 mg by mouth every morning) 90 capsule 3     vitamin D3 (VITAMIN D3) 50 mcg (2000 units) tablet Take 1 tablet (50 mcg) by mouth daily (Patient taking differently: Take 1 tablet by mouth every morning) 90 tablet 2     ARIPiprazole (ABILIFY) 5 MG tablet daily (Patient not taking: Reported on 2022)       BD VIKTORIA U/F 32G X 4 MM insulin pen needle Use 5 per day 300 each 3     Continuous Blood Gluc  (FREESTYLE CLAUDIA 14 DAY READER) CECILIO Use to read blood sugars as per 's instructions. 1 each 0     Continuous Blood Gluc Sensor (FREESTYLE CLAUDIA 2 SENSOR) MISC 1 each See Admin Instructions Change every 14 days. 7 each 3     order for DME 1 Device by Device route daily Knee high compression socks 15-20 mmhg. 1 Device 0       Allergies  Allergies   Allergen Reactions     Codeine Other (See Comments)     Cannot take due to liver  Cannot tolerate oral narcotics     Seasonal Allergies      Sneezing, coughing, runny and itchy eyes       Social History  Social History     Socioeconomic History     Marital status:      Spouse name: Not on file     Number of children: Not on file     Years of education: Not on file     Highest education level: Bachelor's degree (e.g., BA, AB, BS)   Occupational History     Not on file   Tobacco Use     Smoking status: Former     Packs/day: 6.00     Years: 30.00     Pack years: 180.00     Types: Cigars, Cigarettes     Start date: 2016     Quit date: 10/25/2017     Years since quittin.1      Smokeless tobacco: Former     Types: Chew     Quit date: 10/31/2017     Tobacco comments:     1 tin per week   Substance and Sexual Activity     Alcohol use: No     Alcohol/week: 0.0 standard drinks     Comment: quit 1996     Drug use: No     Sexual activity: Not Currently     Partners: Female     Birth control/protection: Condom   Other Topics Concern     Parent/sibling w/ CABG, MI or angioplasty before 65F 55M? Yes   Social History Narrative    Prior [facility], [facility]     Social Determinants of Health     Financial Resource Strain: Not on file   Food Insecurity: Not on file   Transportation Needs: Not on file   Physical Activity: Not on file   Stress: Not on file   Social Connections: Not on file   Intimate Partner Violence: Not At Risk     Fear of Current or Ex-Partner: No     Emotionally Abused: No     Physically Abused: No     Sexually Abused: No   Housing Stability: Not on file       Family History  Family History   Problem Relation Age of Onset     Skin Cancer Mother      Cancer Mother      Diabetes Mother          3/2016     Cerebrovascular Disease Mother         Passed away in Feb of this year, 80 years old.     Thyroid Disease Mother      Depression Mother      Colon Cancer Father 60     Pancreatic Cancer Father 60     Prostate Cancer Father      Colorectal Cancer Father      Macular Degeneration Father      Cancer Father      Glaucoma Father      Skin Cancer Father      Asthma Sister         Had since birth     Thyroid Disease Sister      Depression Sister      Colorectal Cancer Maternal Grandmother      Cancer Maternal Grandmother      Substance Abuse Maternal Grandmother         Alcohol     Prostate Cancer Maternal Grandfather      Substance Abuse Maternal Grandfather         Alcohol     Colorectal Cancer Paternal Grandmother      Liver Disease No family hx of      Melanoma No family hx of      Anesthesia Reaction No family hx of      Deep Vein Thrombosis (DVT) No family hx of         Review of Systems  The complete review of systems is negative other than noted in the HPI or here.   Anesthesia Evaluation   Pt has had prior anesthetic.     No history of anesthetic complications       ROS/MED HX  ENT/Pulmonary:  - neg pulmonary ROS  (-) tobacco use   Neurologic:  - neg neurologic ROS  (-) no seizures and no CVA   Cardiovascular:     (+) Dyslipidemia hypertension--CAD -CABG-date: 7/2021. -Taking blood thinners Previous cardiac testing   Echo: Date: 3/15/22 Results:  Interpretation Summary  Left ventricular function is normal.The ejection fraction is > 65%. Abnormal  non-specific septal motion is present.  Global right ventricular function is normal. The RV is not clearly visualized  but the size and function are probably normal. The RV FAC is 40%.  There are no significant valvular abnormalities.  The estimated PA systolic pressure is 29 mmHg.  IVC diameter <2.1 cm collapsing >50% with sniff suggests a normal RA pressure  of 3 mmHg.  This study was compared with the study from 01/11/2022. There is no  significant change in the biventricular size/function upon direct visual  comparison.  Stress Test: Date: Results:    ECG Reviewed: Date: 1/2022 Results:  Sinus tachycardia  Cath: Date: Results:      METS/Exercise Tolerance: 4 - Raking leaves, gardening Comment: Can walk a couple blocks and ascend a flight of stairs without ROONEY/ CP   Hematologic:    (-) history of blood clots and history of blood transfusion   Musculoskeletal:   (+) arthritis,     GI/Hepatic: Comment: HCC s/p liver transplant    (+) GERD, Asymptomatic on medication, liver disease,     Renal/Genitourinary:     (+) renal disease, type: CRI,     Endo:     (+) type II DM, Last HgA1c: 6.9, date: 12/14/22, Using insulin, Chronic steroid usage for Post Transplant Immunosuppression.     Psychiatric/Substance Use:     (+) psychiatric history depression     Infectious Disease:  - neg infectious disease ROS     Malignancy:       Other:      "       BP (!) 154/82 (BP Location: Right arm, Patient Position: Sitting, Cuff Size: Adult Regular)   Pulse 89   Temp 98.1  F (36.7  C) (Oral)   Resp 16   Ht 1.753 m (5' 9\")   Wt 91 kg (200 lb 11.2 oz)   SpO2 100%   BMI 29.64 kg/m      Physical Exam   Constitutional: Awake, alert, cooperative, no apparent distress, and appears stated age.  Eyes: Pupils equal, round and reactive to light, extra ocular muscles intact, sclera clear, conjunctiva normal.  HENT: Normocephalic, oral pharynx with moist mucus membranes, edentulous  Respiratory: Clear to auscultation bilaterally, no crackles or wheezing.  Cardiovascular: Regular rate and rhythm, normal S1 and S2, and no murmur noted.  Carotids no bruits. No edema. Palpable pulses to radial   arteries.   GI: Normal bowel sounds, non tender, obese  Genitourinary:  deferred  Skin: Warm and dry.  No rashes at anticipated surgical site.   Musculoskeletal: Full ROM of neck. There is no redness, warmth, or swelling of the exposed joints. Gross motor strength is normal.    Neurologic: Awake, alert, oriented to name, place and time. Cranial nerves II-XII are grossly intact. Gait is normal.   Neuropsychiatric: Calm, cooperative. Normal affect.     Prior Labs/Diagnostic Studies   All labs and imaging personally reviewed     EKG/ stress test - if available please see in ROS above   Echo result w/o MOPS: Interpretation SummaryLeft ventricular function is normal.The ejection fraction is > 65%. Abnormalnon-specific septal motion is present.Global right ventricular function is normal. The RV is not clearly visualizedbut the size and function are probably normal. The RV FAC is 40%.There are no significant valvular abnormalities.The estimated PA systolic pressure is 29 mmHg.IVC diameter <2.1 cm collapsing >50% with sniff suggests a normal RA pressureof 3 mmHg.This study was compared with the study from 01/11/2022. There is nosignificant change in the biventricular size/function upon " "direct visualcomparison.      No flowsheet data found.      The patient's records and results personally reviewed by this provider.     Outside records reviewed from: Care Everywhere    LAB/DIAGNOSTIC STUDIES TODAY:  none    Assessment      Frandy Workman is a 57 year old male seen as a PAC referral for risk assessment and optimization for anesthesia.    Plan/Recommendations  Pt will be optimized for the proposed procedure.  See below for details on the assessment, risk, and preoperative recommendations    NEUROLOGY  - No history of TIA, CVA or seizure  -Post Op delirium risk factors:  No risk identified    ENT  - No current airway concerns.  Will need to be reassessed day of surgery.   -edentulous  Mallampati: II  TM: > 3    CARDIAC  -CAD s/p CABG 7/2021. Follows with cardiology, stable. Will continue ASA. Denies cardiac symptoms   - METS (Metabolic Equivalents)  Patient performs 4 or more METS exercise without symptoms            Total Score: 0      RCRI-Low risk: Class 2 0.9% complication rate            Total Score: 1    RCRI: CAD        PULMONARY    ARIELA Medium Risk            Total Score: 3    ARIELA: Hypertension    ARIELA: Over 50 ys old    ARIELA: Male      - Denies asthma or inhaler use  - Tobacco History      History   Smoking Status     Former     Packs/day: 6.00     Years: 30.00     Types: Cigars, Cigarettes     Start date: 5/1/2016     Quit date: 10/25/2017   Smokeless Tobacco     Former     Types: Chew     Quit date: 10/31/2017       GI  - GERD  Controlled on medications: Proton Pump Inhibitor   -HCC, stable. H/o liver transplant maintained on prednisone, mycophenolate   PONV Low Risk  Total Score: 1           1 AN PONV: Patient is not a current smoker        /RENAL  - Baseline Creatinine  1.6.  Follows with nephrology.     ENDOCRINE    - BMI: Estimated body mass index is 29.64 kg/m  as calculated from the following:    Height as of this encounter: 1.753 m (5' 9\").    Weight as of this encounter: 91 kg (200 " lb 11.2 oz).  Overweight (BMI 25.0-29.9)  - Diabetes  Diabetes Type 2, insulin dependent. Hold morning oral hypoglycemic medications and short acting insulin DOS. Take 80% of last scheduled dose of long-acting insulin prior to procedure.  Recommend close monitoring of the patient's blood glucose levels throughout the perioperative period.    HEME  VTE Low Risk 0.5%            Total Score: 2    VTE: Male      - Platelet disfunction second to Aspirin (Carl, many others)    MSK  -arthritis     ACCESS  H/o difficult IV access    Different anesthesia methods/types have been discussed with the patient, but they are aware that the final plan will be decided by the assigned anesthesia provider on the date of service.    The patient is optimized for their procedure. AVS with information on surgery time/arrival time, meds and NPO status given by nursing staff. No further diagnostic testing indicated.      On the day of service:     Prep time: 13 minutes  Visit time: 11 minutes  Documentation time: 7 minutes  ------------------------------------------  Total time: 31 minutes      Lennie Elena PA-C  Preoperative Assessment Center  [facility]  Phone: [phone]  Fax: [phone]

## 2023-01-03 ENCOUNTER — HOSPITAL ENCOUNTER (OUTPATIENT)
Facility: AMBULATORY SURGERY CENTER | Age: 58
Discharge: HOME | End: 2023-01-03
Attending: OPHTHALMOLOGY
Payer: MEDICARE

## 2023-01-03 ENCOUNTER — ANESTHESIA (OUTPATIENT)
Dept: SURGERY | Facility: AMBULATORY SURGERY CENTER | Age: 58
End: 2023-01-03
Payer: MEDICARE

## 2023-01-03 VITALS
DIASTOLIC BLOOD PRESSURE: 94 MMHG | TEMPERATURE: 98.3 F | OXYGEN SATURATION: 100 % | BODY MASS INDEX: 29.62 KG/M2 | WEIGHT: 200 LBS | HEART RATE: 89 BPM | RESPIRATION RATE: 16 BRPM | SYSTOLIC BLOOD PRESSURE: 150 MMHG | HEIGHT: 69 IN

## 2023-01-03 DIAGNOSIS — H25.12 AGE-RELATED NUCLEAR CATARACT OF LEFT EYE: Primary | ICD-10-CM

## 2023-01-03 LAB — GLUCOSE BLDC GLUCOMTR-MCNC: 187 MG/DL (ref 70–99)

## 2023-01-03 PROCEDURE — 82962 GLUCOSE BLOOD TEST: CPT | Performed by: PATHOLOGY

## 2023-01-03 PROCEDURE — 66982 XCAPSL CTRC RMVL CPLX WO ECP: CPT | Mod: LT

## 2023-01-03 PROCEDURE — 66982 XCAPSL CTRC RMVL CPLX WO ECP: CPT | Mod: LT | Performed by: OPHTHALMOLOGY

## 2023-01-03 DEVICE — LENS CC60WF 14.0 CLAREON UV ASPHERIC BICONVEX IOL: Type: IMPLANTABLE DEVICE | Site: EYE | Status: FUNCTIONAL

## 2023-01-03 RX ORDER — TETRACAINE HYDROCHLORIDE 5 MG/ML
SOLUTION OPHTHALMIC PRN
Status: DISCONTINUED | OUTPATIENT
Start: 2023-01-03 | End: 2023-01-03 | Stop reason: HOSPADM

## 2023-01-03 RX ORDER — MEPERIDINE HYDROCHLORIDE 25 MG/ML
12.5 INJECTION INTRAMUSCULAR; INTRAVENOUS; SUBCUTANEOUS
Status: DISCONTINUED | OUTPATIENT
Start: 2023-01-03 | End: 2023-01-04 | Stop reason: HOSPADM

## 2023-01-03 RX ORDER — LIDOCAINE HYDROCHLORIDE 20 MG/ML
INJECTION, SOLUTION INFILTRATION; PERINEURAL PRN
Status: DISCONTINUED | OUTPATIENT
Start: 2023-01-03 | End: 2023-01-03

## 2023-01-03 RX ORDER — CYCLOPENTOLAT/TROPIC/PHENYLEPH 1%-1%-2.5%
1 DROPS (EA) OPHTHALMIC (EYE)
Status: DISCONTINUED | OUTPATIENT
Start: 2023-01-03 | End: 2023-01-04 | Stop reason: HOSPADM

## 2023-01-03 RX ORDER — SODIUM CHLORIDE, SODIUM LACTATE, POTASSIUM CHLORIDE, CALCIUM CHLORIDE 600; 310; 30; 20 MG/100ML; MG/100ML; MG/100ML; MG/100ML
INJECTION, SOLUTION INTRAVENOUS CONTINUOUS
Status: DISCONTINUED | OUTPATIENT
Start: 2023-01-03 | End: 2023-01-04 | Stop reason: HOSPADM

## 2023-01-03 RX ORDER — PREDNISOLONE ACETATE 10 MG/ML
1 SUSPENSION/ DROPS OPHTHALMIC 4 TIMES DAILY
Qty: 5 ML | Refills: 1 | Status: SHIPPED | OUTPATIENT
Start: 2023-01-03 | End: 2023-01-31

## 2023-01-03 RX ORDER — KETOROLAC TROMETHAMINE 5 MG/ML
1 SOLUTION OPHTHALMIC 4 TIMES DAILY
Qty: 5 ML | Refills: 1 | Status: SHIPPED | OUTPATIENT
Start: 2023-01-03 | End: 2023-01-31

## 2023-01-03 RX ORDER — DEXAMETHASONE SODIUM PHOSPHATE 4 MG/ML
INJECTION, SOLUTION INTRA-ARTICULAR; INTRALESIONAL; INTRAMUSCULAR; INTRAVENOUS; SOFT TISSUE PRN
Status: DISCONTINUED | OUTPATIENT
Start: 2023-01-03 | End: 2023-01-03 | Stop reason: HOSPADM

## 2023-01-03 RX ORDER — OFLOXACIN 3 MG/ML
1 SOLUTION/ DROPS OPHTHALMIC 4 TIMES DAILY
Qty: 5 ML | Refills: 0 | Status: SHIPPED | OUTPATIENT
Start: 2023-01-03 | End: 2023-01-31

## 2023-01-03 RX ORDER — ONDANSETRON 2 MG/ML
4 INJECTION INTRAMUSCULAR; INTRAVENOUS EVERY 30 MIN PRN
Status: DISCONTINUED | OUTPATIENT
Start: 2023-01-03 | End: 2023-01-04 | Stop reason: HOSPADM

## 2023-01-03 RX ORDER — BALANCED SALT SOLUTION 6.4; .75; .48; .3; 3.9; 1.7 MG/ML; MG/ML; MG/ML; MG/ML; MG/ML; MG/ML
SOLUTION OPHTHALMIC PRN
Status: DISCONTINUED | OUTPATIENT
Start: 2023-01-03 | End: 2023-01-03 | Stop reason: HOSPADM

## 2023-01-03 RX ORDER — ONDANSETRON 4 MG/1
4 TABLET, ORALLY DISINTEGRATING ORAL EVERY 30 MIN PRN
Status: DISCONTINUED | OUTPATIENT
Start: 2023-01-03 | End: 2023-01-04 | Stop reason: HOSPADM

## 2023-01-03 RX ORDER — PROPARACAINE HYDROCHLORIDE 5 MG/ML
1 SOLUTION/ DROPS OPHTHALMIC ONCE
Status: COMPLETED | OUTPATIENT
Start: 2023-01-03 | End: 2023-01-03

## 2023-01-03 RX ORDER — PROPOFOL 10 MG/ML
INJECTION, EMULSION INTRAVENOUS PRN
Status: DISCONTINUED | OUTPATIENT
Start: 2023-01-03 | End: 2023-01-03

## 2023-01-03 RX ORDER — LIDOCAINE 40 MG/G
CREAM TOPICAL
Status: DISCONTINUED | OUTPATIENT
Start: 2023-01-03 | End: 2023-01-04 | Stop reason: HOSPADM

## 2023-01-03 RX ADMIN — LIDOCAINE HYDROCHLORIDE 80 MG: 20 INJECTION, SOLUTION INFILTRATION; PERINEURAL at 07:59

## 2023-01-03 RX ADMIN — PROPOFOL 20 MG: 10 INJECTION, EMULSION INTRAVENOUS at 08:00

## 2023-01-03 RX ADMIN — PROPOFOL 50 MG: 10 INJECTION, EMULSION INTRAVENOUS at 07:59

## 2023-01-03 RX ADMIN — PROPARACAINE HYDROCHLORIDE 1 DROP: 5 SOLUTION/ DROPS OPHTHALMIC at 06:44

## 2023-01-03 RX ADMIN — Medication 1 DROP: at 06:57

## 2023-01-03 RX ADMIN — PROPOFOL 10 MG: 10 INJECTION, EMULSION INTRAVENOUS at 08:01

## 2023-01-03 RX ADMIN — Medication 1 DROP: at 06:53

## 2023-01-03 RX ADMIN — SODIUM CHLORIDE, SODIUM LACTATE, POTASSIUM CHLORIDE, CALCIUM CHLORIDE: 600; 310; 30; 20 INJECTION, SOLUTION INTRAVENOUS at 06:53

## 2023-01-03 ASSESSMENT — LIFESTYLE VARIABLES: TOBACCO_USE: 0

## 2023-01-03 ASSESSMENT — ENCOUNTER SYMPTOMS: SEIZURES: 0

## 2023-01-03 NOTE — ANESTHESIA CARE TRANSFER NOTE
Patient: Frandy Workman    Procedure: Procedure(s):  PHACOEMULSIFICATION, COMPLEX CATARACT, WITH STANDARD INTRAOCULAR LENS IMPLANT INSERTION LEFT EYE       Diagnosis: Age-related nuclear cataract of left eye [H25.12]  Diagnosis Additional Information: No value filed.    Anesthesia Type:   MAC     Note:    Oropharynx: oropharynx clear of all foreign objects and spontaneously breathing  Level of Consciousness: awake  Oxygen Supplementation: room air    Independent Airway: airway patency satisfactory and stable  Dentition: dentition unchanged  Vital Signs Stable: post-procedure vital signs reviewed and stable  Report to RN Given: handoff report given  Patient transferred to: Phase II    Handoff Report: Identifed the Patient, Identified the Reponsible Provider, Reviewed the pertinent medical history, Discussed the surgical course, Reviewed Intra-OP anesthesia mangement and issues during anesthesia, Set expectations for post-procedure period and Allowed opportunity for questions and acknowledgement of understanding      Vitals:  Vitals Value Taken Time   /96 01/03/23 0844   Temp 36.6  C (97.8  F) 01/03/23 0844   Pulse 82    Resp 16 01/03/23 0844   SpO2 98 % 01/03/23 0844       Electronically Signed By: JAZ MENON  January 3, 2023  8:45 AM

## 2023-01-03 NOTE — ANESTHESIA PREPROCEDURE EVALUATION
-- DO NOT REPLY / DO NOT REPLY ALL --  -- Message is from the [facility]--    COVID-19 Universal Screening: N/A - Not about scheduling    General Patient Message      Reason for Call: The patient is calling to set the straight of what is going on with her medication. She stated the pharmacy I saying they do not have the medication approved. Please call patient to be aware of the medication.   Caller Information       Type Contact Phone    12/22/2020 03:30 PM CST Phone (Incoming) Casandra Isabel (Self) [phone] (H)          Alternative phone number: none     Turnaround time given to caller:   \"This message will be sent to [state Provider's name]. The clinical team will fulfill your request as soon as they review your message.\"     Anesthesia Pre-Procedure Evaluation    Patient: Frandy Workman   MRN: [id number] : 1965        Procedure : Procedure(s):  PHACOEMULSIFICATION, CATARACT, WITH STANDARD INTRAOCULAR LENS IMPLANT INSERTION LEFT EYE          Past Medical History:   Diagnosis Date     Anemia      Arthritis      BPH (benign prostatic hyperplasia)      CAD (coronary artery disease) 2019     Cholelithiasis      Conductive hearing loss 2017     Depressive disorder 1986    Suffer effects throughout life     Gastroesophageal reflux disease 2014     HCC (hepatocellular carcinoma) (H) 2019     History of diabetic retinopathy 2018     HTN (hypertension)      HTN (hypertension) 2019     Hyperlipidemia      Liver cirrhosis secondary to ESTRADA (H)      Liver transplanted (H) 2019     Portal vein thrombosis 2019     Type II diabetes mellitus (H)       Past Surgical History:   Procedure Laterality Date     BYPASS GRAFT ARTERY CORONARY N/A 2021    Procedure: median sternotomy, on cardiopulmonary bypass, CORONARY ARTERY BYPASS GRAFT (CABG) x2 with left greater saphenous vein endoscopic harvest and left internal mammery artery harvest;  Surgeon: Tom Zapata MD;  [facility]: [facility]     COLONOSCOPY           COLONOSCOPY N/A 2019    Procedure: COLONOSCOPY, WITH POLYPECTOMY AND BIOPSY;  Surgeon: Adam Morton MD;  [facility]     CV CENTRAL VENOUS CATHETER PLACEMENT N/A 2021    Procedure: Central Venous Catheter Placement;  Surgeon: Fermin Polanco MD;  Location:  [facility]     CV CORONARY ANGIOGRAM N/A 2021    Procedure: Coronary Angiogram;  Surgeon: Fermin Polanco MD;  Location:  [facility]ATH [facility]     CV HEART CATHETERIZATION WITH POSSIBLE INTERVENTION N/A 2019    Procedure: CORS;  Surgeon: Jagdish Hoyt MD;  Location:  HEART CARDIAC CATH LAB     CV INTRA AORTIC BALLOON N/A 2021    Procedure:  Intra Aortic Balloon Pump Insertion;  Surgeon: Fermin Polanco MD;  Location:  HEART CARDIAC CATH LAB     ESOPHAGOSCOPY, GASTROSCOPY, DUODENOSCOPY (EGD), COMBINED N/A 2016    Procedure: COMBINED ESOPHAGOSCOPY, GASTROSCOPY, DUODENOSCOPY (EGD);  Surgeon: Santi Rosas MD;  Location:  GI     ESOPHAGOSCOPY, GASTROSCOPY, DUODENOSCOPY (EGD), COMBINED N/A 2017    Procedure: COMBINED ESOPHAGOSCOPY, GASTROSCOPY, DUODENOSCOPY (EGD);  EGD;  Surgeon: Santi Rosas MD;  Location:  GI     ESOPHAGOSCOPY, GASTROSCOPY, DUODENOSCOPY (EGD), COMBINED N/A 2018    Procedure: EGD;  Surgeon: Santi Rosas MD;  Location:  OR     ESOPHAGOSCOPY, GASTROSCOPY, DUODENOSCOPY (EGD), COMBINED N/A 2019    Procedure: ESOPHAGOGASTRODUODENOSCOPY, WITH BIOPSY;  Surgeon: Adam Morton MD;  Location:  GI     ESOPHAGOSCOPY, GASTROSCOPY, DUODENOSCOPY (EGD), COMBINED N/A 2020    Procedure: ESOPHAGOGASTRODUODENOSCOPY (EGD);  Surgeon: Santi Rosas MD;  Location:  GI     HEAD & NECK SURGERY      2017 at [facility].      IMPLANT GOLD WEIGHT EYELID Right 2017    Procedure: IMPLANT WEIGHT EYELID;  Right Upper Eyelid Weight, right tarsal strip lower eyelid;  Surgeon: Milana Malave MD;  [facility]     IR CHEMO EMBOLIZATION  2019     KNEE SURGERY Left      ORTHOPEDIC SURGERY       PAROTIDECTOMY, RADICAL NECK DISSECTION Right 2017    Procedure: PAROTIDECTOMY, RADICAL NECK DISSECTION;  Right Superfacial Parotidectomy , Facial nerve repair. with [facility] facial nerve monitor.;  Surgeon: Asiya Morgan MD;  Location: [facility]     PICC INSERTION Left 2017    4fr SL BioFlo PICC, 44cm in the L basilic vein w/ tip in the low SVC     RETURN LIVER TRANSPLANT N/A 2019    Procedure: Exploratory laparotomy, hematoma evacuation, abdominal washout;  Surgeon: Александр Ramos MD;  [facility]: [facility]     TRANSPLANT LIVER RECIPIENT  DONOR N/A  2019    Procedure: TRANSPLANT, LIVER, RECIPIENT,  DONOR;  Surgeon: Александр Ramos MD;  Location: [facility]     VASCULAR SURGERY        Allergies   Allergen Reactions     Codeine Other (See Comments)     Cannot take due to liver  Cannot tolerate oral narcotics     Seasonal Allergies      Sneezing, coughing, runny and itchy eyes      Social History     Tobacco Use     Smoking status: Former     Packs/day: 6.00     Years: 30.00     Pack years: 180.00     Types: Cigars, Cigarettes     Start date: 2016     Quit date: 10/25/2017     Years since quittin.1     Smokeless tobacco: Former     Types: Chew     Quit date: 10/31/2017     Tobacco comments:     1 tin per week   Substance Use Topics     Alcohol use: No     Alcohol/week: 0.0 standard drinks     Comment: quit 1996      Wt Readings from Last 1 Encounters:   23 90.7 kg (200 lb)        Anesthesia Evaluation   Pt has had prior anesthetic.     No history of anesthetic complications       ROS/MED HX  ENT/Pulmonary:  - neg pulmonary ROS  (-) tobacco use   Neurologic:  - neg neurologic ROS  (-) no seizures and no CVA   Cardiovascular:     (+) Dyslipidemia hypertension--CAD -CABG-date: 2021. -Taking blood thinners Previous cardiac testing   Echo: Date: 3/15/22 Results:  Interpretation Summary  Left ventricular function is normal.The ejection fraction is > 65%. Abnormal  non-specific septal motion is present.  Global right ventricular function is normal. The RV is not clearly visualized  but the size and function are probably normal. The RV FAC is 40%.  There are no significant valvular abnormalities.  The estimated PA systolic pressure is 29 mmHg.  IVC diameter <2.1 cm collapsing >50% with sniff suggests a normal RA pressure  of 3 mmHg.  This study was compared with the study from 2022. There is no  significant change in the biventricular size/function upon direct visual  comparison.  Stress Test: Date: Results:    ECG Reviewed: Date:  1/2022 Results:  Sinus tachycardia  Cath: Date: Results:      METS/Exercise Tolerance: 4 - Raking leaves, gardening Comment: Can walk a couple blocks and ascend a flight of stairs without ROONEY/ CP   Hematologic:    (-) history of blood clots and history of blood transfusion   Musculoskeletal:   (+) arthritis,     GI/Hepatic: Comment: HCC s/p liver transplant    (+) GERD, Asymptomatic on medication, liver disease,     Renal/Genitourinary:     (+) renal disease, type: CRI,     Endo:     (+) type II DM, Last HgA1c: 6.9, date: 12/14/22, Using insulin, Chronic steroid usage for Post Transplant Immunosuppression.     Psychiatric/Substance Use:     (+) psychiatric history depression     Infectious Disease:  - neg infectious disease ROS     Malignancy:       Other:            Physical Exam    Airway        Mallampati: II       Respiratory Devices and Support         Dental           Cardiovascular          Rhythm and rate: regular     Pulmonary           breath sounds clear to auscultation           OUTSIDE LABS:  CBC:   Lab Results   Component Value Date    WBC 6.5 12/14/2022    WBC 4.7 09/07/2022    HGB 13.5 12/14/2022    HGB 12.8 (L) 09/07/2022    HCT 41.6 12/14/2022    HCT 40.3 09/07/2022     12/14/2022     (L) 09/07/2022     BMP:   Lab Results   Component Value Date     12/14/2022     09/07/2022    POTASSIUM 4.0 12/14/2022    POTASSIUM 4.7 09/07/2022    CHLORIDE 104 12/14/2022    CHLORIDE 103 09/07/2022    CO2 24 12/14/2022    CO2 27 09/07/2022    BUN 32.5 (H) 12/14/2022    BUN 32.9 (H) 09/07/2022    CR 1.9 (H) 12/14/2022    CR 1.69 (H) 12/14/2022     (H) 01/03/2023    GLC 96 12/14/2022     COAGS:   Lab Results   Component Value Date    PTT 46 (H) 07/14/2021    INR 1.13 01/11/2022    FIBR 372 07/14/2021     POC:   Lab Results   Component Value Date     (H) 06/26/2020     HEPATIC:   Lab Results   Component Value Date    ALBUMIN 4.7 12/14/2022    PROTTOTAL 7.5 12/14/2022    ALT 30  12/14/2022    AST 22 12/14/2022    ALKPHOS 116 12/14/2022    BILITOTAL 0.6 12/14/2022    JAMARI 31 11/15/2019     OTHER:   Lab Results   Component Value Date    PH 7.37 01/10/2022    LACT 0.5 (L) 01/11/2022    A1C 6.9 (H) 12/14/2022    DEBORAH 10.2 (H) 12/14/2022    PHOS 3.4 09/07/2022    MAG 2.3 01/10/2022    LIPASE 115 01/11/2022    AMYLASE 26 (L) 11/12/2019    TSH 1.24 04/25/2022    T4 1.21 08/08/2018    CRP 44.0 (H) 01/11/2022       Anesthesia Plan    ASA Status:  3   NPO Status:  NPO Appropriate    Anesthesia Type: MAC.              Consents    Anesthesia Plan(s) and associated risks, benefits, and realistic alternatives discussed. Questions answered and patient/representative(s) expressed understanding.    - Discussed:     - Discussed with:  Patient         Postoperative Care            Comments:                Dieter Contreras MD

## 2023-01-03 NOTE — DISCHARGE INSTRUCTIONS
[facility]  Home Care Following Anesthesia  For 24 hours after surgery:  Get plenty of rest.  A responsible adult must stay with you for at least 24 hours after you leave the surgery center.  Do not drive or use heavy equipment.  If you have weakness or tingling, don't drive or use heavy equipment until this feeling goes away.   Do not drink alcohol.   Avoid strenuous or risky activities.  Ask for help when climbing stairs.  You may feel lightheaded.  IF so, sit for a few minutes before standing.  Have someone help you get up.   If you have nausea (feel sick to your stomach): Drink only clear liquids such as apple juice, ginger ale, broth or 7-Up.  Rest may also help.  Be sure to drink enough fluids.  Move to a regular diet as you feel able.   You may have a slight fever.  Call the doctor if your fever is over 100 F (37.7 C) (taken under the tongue) or lasts longer than 24 hours.  You may have a dry mouth, a sore throat, muscle aches or trouble sleeping. These should go away after 24 hours.  Do not make important or legal decisions.   It is recommended to avoid smoking.               Tips for taking pain medications  To get the best pain relief possible, remember these points:  Take pain medications as directed, before pain becomes severe.  Pain medication can upset your stomach: taking it with food may help.  Constipation is a common side effect of pain medication. Drink plenty of  fluids.  Eat foods high in fiber. Take a stool softener if recommended by your doctor or pharmacist.  Do not drink alcohol, drive or operate machinery while taking pain medications.  Ask about other ways to control pain, such as with heat, ice or relaxation.    Tylenol/Acetaminophen Consumption  To help encourage the safe use of acetaminophen, the makers of TYLENOL  have lowered the maximum daily dose for single-ingredient Extra Strength TYLENOL  (acetaminophen) products sold in the [place] from 8 pills  per day (4,000 mg) to 6 pills per day (3,000 mg). The dosing interval has also changed from 2 pills every 4-6 hours to 2 pills every 6 hours.  If you feel your pain relief is insufficient, you may take Tylenol/Acetaminophen in addition to your narcotic pain medication.   Be careful not to exceed 3,000 mg of Tylenol/Acetaminophen in a 24 hour period from all sources.  If you are taking extra strength Tylenol/acetaminophen (500 mg), the maximum dose is 6 tablets in 24 hours.  If you are taking regular strength acetaminophen (325 mg), the maximum dose is 9 tablets in 24 hours.    Call a doctor for any of the following:  Signs of infection (fever, growing tenderness at the surgery site, a large amount of drainage or bleeding, severe pain, foul-smelling drainage, redness, swelling).  It has been over 8 to 10 hours since surgery and you are still not able to urinate (pass water).  Headache for over 24 hours.  Signs of Covid-19 infection (temperature over 100 degrees, shortness of breath, cough, loss of taste/smell, generalized body aches, persistent headache, chills, sore throat, nausea/vomiting/diarrhea)  Your doctor is:       Dr. Enriqueta Martin, Ophthalmology: [phone]               Or dial [phone] and ask for the resident on call for:  Ophthalmology  For emergency care, call the:  [place] [facility]:  [phone] (TTY for hearing impaired: [phone])                   POST-OPERATIVE INSTRUCTIONS FOLLOWING SURGERY    Enriqueta Martin MD  Department of Ophthalmology  [facility]  [phone]    FOLLOW UP:    You have a follow up appointment tomorrow at 9:20 AM at the Eye Clinic in the [facility] 9th floor.      EYE DROPS  Drops will be given to you after the surgery.  They DO NOT need to be used until after you are seen in clinic.  DO NOT disturb your bandage the first night.         Ofloxacin (tan top) --- 4 times per day  Ketorolac (gray top) --- 4 times per  "day  Pred Forte (prednisolone acetate) --- 4 times per day      ACTIVITY:  No heavy lifting after surgery. Do not do anything strenuous.  Do not bend over at the waist  Keep the bandage in place until you are seen tomorrow   Do not get your bandage wet      PAIN MEDICATION   It is common to have some mild or moderate discomfort after eye surgery.  Tylenol or ibuprofen may be taken if you don't have any other general health conditions that prevent you from taking these.      WHAT TO EXPECT  It is common for the eye to to have a blood tinged discharge for a few days after surgery    The eye may feel irritated (as if something were in your eye), for there to be clear discharge (thicker in the mornings upon awakening), and for it to be bloodshot for 2-3 weeks following retina surgery.     How good your vision is after cataract surgery depends on a lot of factors, such as how thick your cataract was and whether your eyes have other problems in addition to cataracts. Do not worry about what you can see on the first day or two. You should improve steadily and notice improvement over the coming days to weeks.       WHAT TO WATCH OUT FOR:    If you experience any of the following \"RSVP Symptoms\", you should call immediately:  Worsening Redness  Worsening Sensitivity to light  Worsening Vision, including new flashing lights or floaters  Worsening Pain including nausea/vomiting      For any of the symptoms listed above, or for other concerns, call [phone] and ask to speak to the clinic nurse.  If you call after hours, follow to prompts to reach the doctor on call.    "

## 2023-01-03 NOTE — OP NOTE
SURGEON:      Enriqueta Martin MD    ASSISTANT SURGEON(S):  Layton Shanks MD    PREOPERATIVE DIAGNOSIS: Visually significant cataract, left eye    POSTOPERATIVE DIAGNOSIS:  same    NAME OF THE PROCEDURE:  Phacoemulsification with intraocular lens implantation, CC60WF 14.0 D    FINDINGS:    Moderate density cataract       Intraoperative Floppy Iris Syndrome    ANESTHESIA:    Monitored anesthesia care with retrobulbar block    BLOOD LOSS:   <1cc    SPECIMENS:    None    COMPLICATIONS:    None    INDICATIONS: Frandy Workman is a 57 year old with diagnosis of visually significant cataract, here for cataract surgery    DESCRIPTION OF THE PROCEDURE:    The patient was taken to the operative room where monitored anesthesia was administered. The eye was anesthetized with topical drops only.       The operative eye was prepped and draped in the usual sterile surgical fashion for ophthalmic surgery, including the installation of one drop of 5% Povidone Iodine.  A sterile drape was placed over the face and body and a lid speculum was inserted.      With the use of a Supersharp blade and 0.12 forceps, a paracentesis was created.    Vision blue was instilled into the anterior chamber then washed out after 30 seconds.     Viscoelastic was injected into the anterior chamber using a canula.  A 2.75 mm keratome was then used to construct a clear corneal incision.      A Malyugin ring was inserted.    Using Utrata forceps and cystotome needle, a continuous curvilinear capsulorrhexis was created and hydrodissection was undertaken with the use of BSS.  The nucleus was found to be freely mobile and then removed by phacoemulsification.  The remaining elements of cortex were then removed with irrigation/aspiration.      An IOL,was injected into the capsular bag and was rotated into a good position.     The ring was removed.     The remaining elements of viscoelastic were then removed with irrigation/aspiration. The wounds were  hydrated, checked, and found to be watertight.    The lid speculum was removed.  Subconjunctival injection of dexamethasone and Ancef were administered. The eye was cleaned with wet and dry gauze. Maxitrol ointment was placed on the eye.  A patch and [place] shield were placed over the eye.      The patient was discharge in stable condition having tolerated the procedure well    Layton Shanks MD  Retina Fellow  Department of Ophthalmology  [facility]  Pager: [phone]    Implant Name Type Inv. Item Serial No.  Lot No. LRB No. Used Action   LENS CC60WF.140 [employer] UV ASPHERIC BICONVEX IOL - [id number] Lens/Eye Implant LENS CC60WF.140 [employer] UV ASPHERIC BICONVEX IOL [id number] [employer]  Left 1 Implanted     The surgery was assisted by Dr. Layton Shanks. Due to the delicate and complex nature of this surgery, an assistant was required. He assisted with Cataract extraction intraocular lens. I was present for the entire surgery.

## 2023-01-03 NOTE — ANESTHESIA POSTPROCEDURE EVALUATION
Patient: Frandy Workman    Procedure: Procedure(s):  PHACOEMULSIFICATION, COMPLEX CATARACT, WITH STANDARD INTRAOCULAR LENS IMPLANT INSERTION LEFT EYE       Anesthesia Type:  MAC    Note:  Disposition: Outpatient   Postop Pain Control: Uneventful            Sign Out: Well controlled pain   PONV: No   Neuro/Psych: Uneventful            Sign Out: Acceptable/Baseline neuro status   Airway/Respiratory: Uneventful            Sign Out: Acceptable/Baseline resp. status   CV/Hemodynamics: Uneventful            Sign Out: Acceptable CV status; No obvious hypovolemia; No obvious fluid overload   Other NRE: NONE   DID A NON-ROUTINE EVENT OCCUR?            Last vitals:  Vitals Value Taken Time   /94 01/03/23 0915   Temp 36.8  C (98.3  F) 01/03/23 0915   Pulse     Resp 16 01/03/23 0915   SpO2 100 % 01/03/23 0915       Electronically Signed By: Dieter Contreras MD  January 3, 2023  12:18 PM

## 2023-01-03 NOTE — BRIEF OP NOTE
[facility]    Brief Operative Note    Pre-operative diagnosis: Age-related nuclear cataract of left eye [H25.12]  Post-operative diagnosis Same as pre-operative diagnosis    Procedure: Procedure(s):  PHACOEMULSIFICATION, COMPLEX CATARACT, WITH STANDARD INTRAOCULAR LENS IMPLANT INSERTION LEFT EYE  Surgeon: Surgeon(s) and Role:     * Enriqueta Martin MD - Primary  Anesthesia: MAC with Block   Estimated Blood Loss: None    Drains: None  Specimens: * No specimens in log *  Findings:   None.  Complications: None.  Implants:   Implant Name Type Inv. Item Serial No.  Lot No. LRB No. Used Action   LENS CC60WF.140 CLAREON UV ASPHERIC BICONVEX IOL - [id number] Lens/Eye Implant LENS CC60WF.140 [employer] UV ASPHERIC BICONVEX IOL [id number] [employer]  Left 1 Implanted

## 2023-01-04 ENCOUNTER — OFFICE VISIT (OUTPATIENT)
Dept: OPHTHALMOLOGY | Facility: CLINIC | Age: 58
End: 2023-01-04
Attending: OPHTHALMOLOGY
Payer: MEDICARE

## 2023-01-04 DIAGNOSIS — Z48.810 AFTERCARE FOLLOWING SURGERY OF A SENSORY ORGAN: Primary | ICD-10-CM

## 2023-01-04 PROCEDURE — 99024 POSTOP FOLLOW-UP VISIT: CPT | Mod: GC | Performed by: OPHTHALMOLOGY

## 2023-01-04 PROCEDURE — G0463 HOSPITAL OUTPT CLINIC VISIT: HCPCS

## 2023-01-04 ASSESSMENT — VISUAL ACUITY
OS_SC: 20/30
OD_CC+: -2
OD_CC: 20/30
METHOD: SNELLEN - LINEAR
OS_PH_SC+: +1
OS_SC+: -2
OS_PH_SC: 20/30

## 2023-01-04 ASSESSMENT — TONOMETRY
OD_IOP_MMHG: 16
OS_IOP_MMHG: 17
IOP_METHOD: TONOPEN

## 2023-01-04 ASSESSMENT — SLIT LAMP EXAM - LIDS
COMMENTS: SMALL PAPILLOMA ALONG LL MARGIN, NON CONCERNING
COMMENTS: NORMAL

## 2023-01-04 ASSESSMENT — EXTERNAL EXAM - LEFT EYE: OS_EXAM: PERIOCULAR ECCHYMOSIS

## 2023-01-04 ASSESSMENT — CUP TO DISC RATIO: OS_RATIO: 0.3

## 2023-01-04 ASSESSMENT — EXTERNAL EXAM - RIGHT EYE: OD_EXAM: NORMAL

## 2023-01-04 NOTE — PROGRESS NOTES
CC:  Follow up Cataract extraction intraocular lens left eye     HPI: Frandy Workman is a 57 year old patient status post Cataract extraction intraocular lens left eye   history of Diabetes mellitus for 24 ys . Patient on insulin.    Interval History: s/p CEIOL left eye 1/3/22    Retinal Imaging:  OCT 12/21/22   RE: central Diabetic macular edema, slightly worse, VMA present, ok foveal contour,   LE: improved Diabetic macular edema, mild Epiretinal membrane, normal foveal contour;     fluorescein angiography 09/28/22  limited by oral dye. No obvious NVE, but peripheral leakage and capillary non perfusion; neovascularization elsewhere left eye probably not seen because of  Vitreous hemorrhage and oral fluorescein    Optos consistent with clinical exam    Assessment & Plan:  # Pseudophakia, left eye  - s/p CEIOL left eye 1/3/23 by Dr. Martin  intraocular lens in good position    #. Senile nuclear sclerosis, right eye  - visually significant  - surgery not yet scheduled    #T2DM   - HbA1c 6.0% (1/2022)  - Blood pressure (<120/80) and blood glucose (HbA1c <7.0, ~6.5 today) control discussed with patient. Patient advised that failure to adequately control each may lead to vision loss. The patient expressed understanding.    # Diabetic macular edema both eyes   - status post avastin x 4. Switch to Eylea 4/24/19 with improvement of Diabetic macular edema   - mild DME each eye (right eye slightly worse, left eye improving with injections)   - watching closely right eye   - left eye plan for eylea 4 weeks, 2/1/23    # Proliferative diabetic retinopathy both eyes   # pre-proliferative diabetic retinopathy right eye    # new vitreous hemorrhage left eye 10/12/22   Status post  Eylea inj left eye  Status post  Panretinal laser photocoagulation (PRP) 9.28.22 left eye and Status post scatter PRP right eye 11/02/22     # Dry eye syndrome   Left eye worse than right eye   warm compresses and artificial tears  As needed  -  punctal plugs previously left eye - reports this is better     # Status post liver transplant  - tx 11/2019  - severe anemia; s/p transfusion - anemia much improved   - being seen by his primary team    PLAN:  Follow up 1 week for POW1 CEIOL left eye  Follow up in 4 weeks, OCT each eye, Eylea left eye    Ketorolac (gray top) four times a day    Predforte  (pink top) four times a day  (shake the bottle before)  Ofloxacin (tan top) four times a day    Put the eyedrops 5 minutes a part  Eye shield or glasses at all times x 3 weeks  Sleep with the shield  No heavy lifting   Follow-up in one week  Retina detachment and endophthalmitis precautions were discussed with the patient (increased blurry vision, drainage, new flashes, floaters or a curtain in the visual field) and was asked to return if any of the those occur      Princess Berg MD  Ophthalmology Resident, PGY-3  [facility]      ~~~~~~~~~~~~~~~~~~~~~~~~~~~~~~~~~~   Complete documentation of historical and exam elements from today's encounter can be found in the full encounter summary report (not reduplicated in this progress note).  I personally obtained the chief complaint(s) and history of present illness.  I confirmed and edited as necessary the review of systems, past medical/surgical history, family history, social history, and examination findings as documented by others; and I examined the patient myself.  I personally reviewed the relevant tests, images, and reports as documented above.  I formulated and edited as necessary the assessment and plan and discussed the findings and management plan with the patient and family    Enriqueta Martin MD   of Ophthalmology.  Retina Service   Department of Ophthalmology and Visual Neurosciences   [facility]  Phone: [phone]   Fax: [phone]

## 2023-01-04 NOTE — NURSING NOTE
Chief Complaints and History of Present Illnesses   Patient presents with     Post Op (Ophthalmology) Left Eye     Chief Complaint(s) and History of Present Illness(es)     Post Op (Ophthalmology) Left Eye            Associated symptoms: itching.  Negative for dryness, eye pain and redness    Pain scale: 0/10          Comments    Miller is here one day post LE cataract surgery with IOL implant. He states he slept okay last night and today LE felt irritated. LE feels better with patch off, but kind of itchy.     Brian Flower COT 9:11 AM January 4, 2023

## 2023-01-11 ENCOUNTER — OFFICE VISIT (OUTPATIENT)
Dept: OPHTHALMOLOGY | Facility: CLINIC | Age: 58
End: 2023-01-11
Attending: OPHTHALMOLOGY
Payer: MEDICARE

## 2023-01-11 DIAGNOSIS — H25.11 NUCLEAR SENILE CATARACT OF RIGHT EYE: Primary | ICD-10-CM

## 2023-01-11 PROBLEM — H25.12 AGE-RELATED NUCLEAR CATARACT OF LEFT EYE: Status: ACTIVE | Noted: 2022-12-08

## 2023-01-11 PROCEDURE — 99024 POSTOP FOLLOW-UP VISIT: CPT | Mod: GC | Performed by: OPHTHALMOLOGY

## 2023-01-11 PROCEDURE — G0463 HOSPITAL OUTPT CLINIC VISIT: HCPCS

## 2023-01-11 ASSESSMENT — VISUAL ACUITY
OD_CC+: -2
OS_SC: 20/20
OS_SC+: -2
CORRECTION_TYPE: GLASSES
OD_CC: 20/30
METHOD: SNELLEN - LINEAR

## 2023-01-11 ASSESSMENT — CONF VISUAL FIELD
OD_INFERIOR_TEMPORAL_RESTRICTION: 0
OS_INFERIOR_NASAL_RESTRICTION: 0
OD_INFERIOR_NASAL_RESTRICTION: 0
OS_SUPERIOR_NASAL_RESTRICTION: 0
OD_NORMAL: 1
OD_SUPERIOR_NASAL_RESTRICTION: 0
OS_SUPERIOR_TEMPORAL_RESTRICTION: 0
OS_INFERIOR_TEMPORAL_RESTRICTION: 0
OD_SUPERIOR_TEMPORAL_RESTRICTION: 0
METHOD: COUNTING FINGERS
OS_NORMAL: 1

## 2023-01-11 ASSESSMENT — REFRACTION_WEARINGRX
OS_SPHERE: -6.75
OD_ADD: +2.00
OS_AXIS: 044
OS_ADD: +2.00
OS_CYLINDER: +0.50
OD_AXIS: 131
OD_CYLINDER: +0.75
SPECS_TYPE: PAL
OD_SPHERE: -7.00

## 2023-01-11 ASSESSMENT — CUP TO DISC RATIO
OD_RATIO: 0.2
OS_RATIO: 0.3

## 2023-01-11 ASSESSMENT — TONOMETRY
OS_IOP_MMHG: 13
OD_IOP_MMHG: 10
IOP_METHOD: TONOPEN

## 2023-01-11 ASSESSMENT — EXTERNAL EXAM - LEFT EYE: OS_EXAM: PERIOCULAR ECCHYMOSIS

## 2023-01-11 ASSESSMENT — EXTERNAL EXAM - RIGHT EYE: OD_EXAM: NORMAL

## 2023-01-11 ASSESSMENT — SLIT LAMP EXAM - LIDS
COMMENTS: SMALL PAPILLOMA ALONG LL MARGIN, NON CONCERNING
COMMENTS: NORMAL

## 2023-01-11 NOTE — NURSING NOTE
Chief Complaints and History of Present Illnesses   Patient presents with     Post Op (Ophthalmology) Left Eye     S/p PHACO IOL left eye 1/3/22 Dr. Martin     Chief Complaint(s) and History of Present Illness(es)     Post Op (Ophthalmology) Left Eye            Laterality: left eye    Onset: 1 week ago    Associated symptoms: dryness and itching.  Negative for eye pain, redness, flashes and floaters    Pain scale: 0/10    Comments: S/p PHACO IOL left eye 1/3/22 Dr. Martin          Comments        Ocular meds:  Ketorolac (gray top) four times a day    Predforte  (pink top) four times a day  (shake the bottle before)  Ofloxacin (tan top) four times a day      DM2 BS: 179 this morning   Lab Results       Component                Value               Date                       A1C                      6.9                 12/14/2022                 A1C                      6.0                 01/10/2022                 A1C                      6.8                 07/13/2021                 A1C                      6.0                 12/30/2020                 A1C                      6.3                 06/13/2020                 A1C                      6.6                 08/08/2018                 A1C                      6.5                 06/09/2017                 A1C                      7.8                 10/25/2016               DAVE LEONG January 11, 2023 9:03 AM

## 2023-01-11 NOTE — PATIENT INSTRUCTIONS
Predforte (white top) taper three times per day for 1 week, then two times per day for 1 week, then daily for 1 week, then stop  Stop ofloxacin and ketorolac  Put the eyedrops 5 minutes a part  Ok to resume normal activities

## 2023-01-11 NOTE — PROGRESS NOTES
CC:  Follow up Cataract extraction intraocular lens left eye     HPI: Frandy Workman is a 57 year old patient status post Cataract extraction intraocular lens left eye   history of Diabetes mellitus for 24 ys . Patient on insulin.    Interval History: s/p CEIOL left eye 1/3/22    Retinal Imaging:  OCT 12/21/22   RE: central Diabetic macular edema, slightly worse, VMA present, ok foveal contour,   LE: improved Diabetic macular edema, mild Epiretinal membrane, normal foveal contour;     fluorescein angiography 09/28/22  limited by oral dye. No obvious NVE, but peripheral leakage and capillary non perfusion; neovascularization elsewhere left eye probably not seen because of  Vitreous hemorrhage and oral fluorescein    Optos consistent with clinical exam    Assessment & Plan:  # Pseudophakia, left eye  - s/p CEIOL left eye 1/3/23 by Dr. Martin  - intraocular lens in good position  - vision excellent today (20/20)  - IOP 13    #. Senile nuclear sclerosis, right eye  - visually significant  - surgery not yet scheduled  Visually significant:YES  Glare: Positive   Reports impacts ADL   Dilation: 6 mm  Iris expansion: yes; likely  Pseudoexfoliation: NO  Trypan Blue: yes  Phacodonesis: No  Cornea guttae: rare  Aim for: -0.5  Start artificial tears twice a day and as needed    Anesthesia: peribulbar block  Surgical time: 60 min  Candidate for toric IOL:   Anticoagulants: aspirin  Might need modified block  Alpha blockers/Flomax: YES Patient on flomax   I reviewed the indications, risks, benefits, and alternatives of the proposed surgical procedure. We discussed at length cataracts and the effect of the cataracts on vision.   We discussed the fact that modern cataract surgery is usually successful at alleviating symptoms of glare when the cataract is the causative factor. Other risks were discussed with patient including, but not limited to, failure to improve vision or further loss of vision,  and need for additional  surgery, bleeding, infection, loss of vision and the remote possibility of complications of anesthesia. 1:1000 risk of infection/bleed/loss of eye; 1:100 risk of RD and need for further surgery. Patient agreed to proceed with surgery.  I provided multiple opportunities for the questions, answered all questions to the best of my ability, and confirmed that my answers and my discussion were understood.     #T2DM   - HbA1c 6.0% (1/2022)  - Blood pressure (<120/80) and blood glucose (HbA1c <7.0, ~6.5 today) control discussed with patient. Patient advised that failure to adequately control each may lead to vision loss. The patient expressed understanding.    # Diabetic macular edema both eyes   - status post avastin x 4. Switch to Eylea 4/24/19 with improvement of Diabetic macular edema   - mild DME each eye (right eye slightly worse, left eye improving with injections)   - watching closely right eye   - left eye plan for eylea 4 weeks, 2/1/23    # Proliferative diabetic retinopathy both eyes   # pre-proliferative diabetic retinopathy right eye    # new vitreous hemorrhage left eye 10/12/22   Status post  Eylea inj left eye  Status post  Panretinal laser photocoagulation (PRP) 9.28.22 left eye and Status post scatter PRP right eye 11/02/22     # Dry eye syndrome   Left eye worse than right eye   warm compresses and artificial tears  As needed  - punctal plugs previously left eye - reports this is better     # Status post liver transplant  - tx 11/2019  - severe anemia; s/p transfusion - anemia much improved   - being seen by his primary team    PLAN:  Follow up in 3 weeks, OCT each eye, Eylea left eye    Predforte (white top) taper three times per day for 1 week, then two times per day for 1 week, then daily for 1 week, then stop  Stop ofloxacin and ketorolac  Put the eyedrops 5 minutes a part  Ok to resume normal activities    patient to return in Feb 1st for post op and injection of Eylea     Princess Berg,  MD  Ophthalmology Resident, PGY-3  [facility]      ~~~~~~~~~~~~~~~~~~~~~~~~~~~~~~~~~~   Complete documentation of historical and exam elements from today's encounter can be found in the full encounter summary report (not reduplicated in this progress note).  I personally obtained the chief complaint(s) and history of present illness.  I confirmed and edited as necessary the review of systems, past medical/surgical history, family history, social history, and examination findings as documented by others; and I examined the patient myself.  I personally reviewed the relevant tests, images, and reports as documented above.  I formulated and edited as necessary the assessment and plan and discussed the findings and management plan with the patient and family    Enriqueta Martin MD   of Ophthalmology.  Retina Service   Department of Ophthalmology and Visual Neurosciences   [facility]  Phone: [phone]   Fax: [phone]

## 2023-01-14 ENCOUNTER — OFFICE VISIT (OUTPATIENT)
Dept: FAMILY MEDICINE | Facility: CLINIC | Age: 58
End: 2023-01-14
Payer: MEDICARE

## 2023-01-14 VITALS
OXYGEN SATURATION: 98 % | BODY MASS INDEX: 28.8 KG/M2 | SYSTOLIC BLOOD PRESSURE: 137 MMHG | DIASTOLIC BLOOD PRESSURE: 78 MMHG | WEIGHT: 195 LBS | HEART RATE: 86 BPM

## 2023-01-14 DIAGNOSIS — Z12.5 ENCOUNTER FOR SCREENING FOR MALIGNANT NEOPLASM OF PROSTATE: ICD-10-CM

## 2023-01-14 DIAGNOSIS — D63.1 ANEMIA OF CHRONIC RENAL FAILURE, STAGE 3B (H): ICD-10-CM

## 2023-01-14 DIAGNOSIS — Z94.4 LIVER TRANSPLANT RECIPIENT (H): Primary | ICD-10-CM

## 2023-01-14 DIAGNOSIS — N18.32 ANEMIA OF CHRONIC RENAL FAILURE, STAGE 3B (H): ICD-10-CM

## 2023-01-14 DIAGNOSIS — R53.83 FATIGUE, UNSPECIFIED TYPE: ICD-10-CM

## 2023-01-14 DIAGNOSIS — N18.32 STAGE 3B CHRONIC KIDNEY DISEASE (H): ICD-10-CM

## 2023-01-14 DIAGNOSIS — Z00.00 HEALTH CARE MAINTENANCE: ICD-10-CM

## 2023-01-14 PROCEDURE — 99203 OFFICE O/P NEW LOW 30 MIN: CPT | Performed by: FAMILY MEDICINE

## 2023-01-14 NOTE — NURSING NOTE
Chief Complaint   Patient presents with     Establish Care     Pt would like to reestablish care       Eun Awad CMA, EMT at 11:21 AM on 1/14/2023.

## 2023-01-14 NOTE — PROGRESS NOTES
"    Assessment & Plan     Liver transplant recipient (H)  Follows w/ liver, and onco for HCC    Stage 3b chronic kidney disease (H)  Follows w/ renal    Anemia of chronic renal failure, stage 3b (H)  Same    Fatigue, unspecified type  Cont w/ all other providers. Diagnoses, lab abnormalites, polypharmacy can contribute, but see if has ARIELA.  - Adult Sleep Eval & Management Referral; Future    Health care maintenance  Discussed  - PSA, screen; Future    Encounter for screening for malignant neoplasm of prostate  Same  - PSA, screen; Future      32 minutes spent on the date of the encounter doing chart review, history and exam, documentation and further activities per the note, we look at disability forms, three pages, I ask him to read thru before appt to fill out so familiar w/ questsion. [facility]    BMI:   Estimated body mass index is 28.8 kg/m  as calculated from the following:    Height as of 1/3/23: 1.753 m (5' 9\").    Weight as of this encounter: 88.5 kg (195 lb).       No follow-ups on file.    Lamin Ortiz MD  [facility] [place]    Nathaly Bhatt is a 57 year old, presenting for the following health issues:  Establish Care (Pt would like to reestablish care)      HPI Here to est care w/ me  Was seeing IM residents here in clinic, last note rvwd  Sees many other providers: eye, onco, liver, endo, mental health, derm, renal, cardio  Due for PSA, discussed, he'll do w/ next labs for other providers  Due for second Shingrix, we suggest at [facility] to hime  C/o:  1-fatigued; on many meds. Labs from other providers rvwd. Unclear if snores but non restorative sleep. Never has had ARIELA eval, discussed what it is. Will refer  2-disabled for years per pt. Needs [employer] forms. He'll make separate appt for; last in media tab are from before liver transplant, at that point done for liver failure reasons by hepatologist Oct 2019.   Used to work white collar job.  Past " Medical History:   Diagnosis Date     Anemia 2013     Arthritis      BPH (benign prostatic hyperplasia)      CAD (coronary artery disease) 4/1/2019     Cholelithiasis      Conductive hearing loss 08/16/2017     Depressive disorder 1986    Suffer effects throughout life     Gastroesophageal reflux disease 12/01/2014     HCC (hepatocellular carcinoma) (H) 1/22/2019     History of diabetic retinopathy 07/2018     HTN (hypertension)      HTN (hypertension) 11/20/2019     Hyperlipidemia      Liver cirrhosis secondary to ESTRADA (H)      Liver transplanted (H) 11/11/2019     Portal vein thrombosis 4/11/2019     Type II diabetes mellitus (H)      Past Surgical History:   Procedure Laterality Date     BYPASS GRAFT ARTERY CORONARY N/A 7/14/2021    Procedure: median sternotomy, on cardiopulmonary bypass, CORONARY ARTERY BYPASS GRAFT (CABG) x2 with left greater saphenous vein endoscopic harvest and left internal mammery artery harvest;  Surgeon: Tmo Zapata MD;  Location: [facility] OR     COLONOSCOPY      2015     COLONOSCOPY N/A 12/6/2019    Procedure: COLONOSCOPY, WITH POLYPECTOMY AND BIOPSY;  Surgeon: Adam Morton MD;  [facility]     CV CENTRAL VENOUS CATHETER PLACEMENT N/A 7/12/2021    Procedure: Central Venous Catheter Placement;  Surgeon: Fermin Polanco MD;  Location:  HEART CARDIAC CATH LAB     CV CORONARY ANGIOGRAM N/A 7/12/2021    Procedure: Coronary Angiogram;  Surgeon: Fermin Polanco MD;  Location: [facility] CARDIAC CATH LAB     CV HEART CATHETERIZATION WITH POSSIBLE INTERVENTION N/A 2/26/2019    Procedure: CORS;  Surgeon: Jagdish Hoyt MD;  Location:  HEART CARDIAC CATH LAB     CV INTRA AORTIC BALLOON N/A 7/12/2021    Procedure: Intra Aortic Balloon Pump Insertion;  Surgeon: Fermin Polanco MD;  Location: [facility] CARDIAC CATH LAB     ESOPHAGOSCOPY, GASTROSCOPY, DUODENOSCOPY (EGD), COMBINED N/A 11/17/2016    Procedure: COMBINED ESOPHAGOSCOPY,  GASTROSCOPY, DUODENOSCOPY (EGD);  Surgeon: Santi Rosas MD;  [facility]     ESOPHAGOSCOPY, GASTROSCOPY, DUODENOSCOPY (EGD), COMBINED N/A 2017    Procedure: COMBINED ESOPHAGOSCOPY, GASTROSCOPY, DUODENOSCOPY (EGD);  EGD;  Surgeon: Santi Rosas MD;  Location:  GI     ESOPHAGOSCOPY, GASTROSCOPY, DUODENOSCOPY (EGD), COMBINED N/A 2018    Procedure: EGD;  Surgeon: Santi Rosas MD;  [facility]     ESOPHAGOSCOPY, GASTROSCOPY, DUODENOSCOPY (EGD), COMBINED N/A 2019    Procedure: ESOPHAGOGASTRODUODENOSCOPY, WITH BIOPSY;  Surgeon: Adam Morton MD;  Location:  GI     ESOPHAGOSCOPY, GASTROSCOPY, DUODENOSCOPY (EGD), COMBINED N/A 2020    Procedure: ESOPHAGOGASTRODUODENOSCOPY (EGD);  Surgeon: Santi Rosas MD;  Location:  GI     HEAD & NECK SURGERY      2017 at [facility].      IMPLANT GOLD WEIGHT EYELID Right 2017    Procedure: IMPLANT WEIGHT EYELID;  Right Upper Eyelid Weight, right tarsal strip lower eyelid;  Surgeon: Milana Malave MD;  [facility]     IR CHEMO EMBOLIZATION  2019     KNEE SURGERY Left      ORTHOPEDIC SURGERY       PAROTIDECTOMY, RADICAL NECK DISSECTION Right 2017    Procedure: PAROTIDECTOMY, RADICAL NECK DISSECTION;  Right Superfacial Parotidectomy , Facial nerve repair. with [facility] facial nerve monitor.;  Surgeon: Asiya Morgan MD;  Location: [facility] OR     PHACOEMULSIFICATION WITH STANDARD INTRAOCULAR LENS IMPLANT Left 1/3/2023    Procedure: PHACOEMULSIFICATION, COMPLEX CATARACT, WITH STANDARD INTRAOCULAR LENS IMPLANT INSERTION LEFT EYE;  Surgeon: Enriqueta Martin MD;  Location: [facility] OR     PICC INSERTION Left 2017    4fr SL BioFlo PICC, 44cm in the L basilic vein w/ tip in the low SVC     RETURN LIVER TRANSPLANT N/A 2019    Procedure: Exploratory laparotomy, hematoma evacuation, abdominal washout;  Surgeon: Александр Ramos MD;  Location: [facility] OR     TRANSPLANT LIVER RECIPIENT   DONOR N/A 2019    Procedure: TRANSPLANT, LIVER, RECIPIENT,  DONOR;  Surgeon: Александр Ramos MD;  Location: [facility] OR     VASCULAR SURGERY       Current Outpatient Medications   Medication     Acetaminophen (TYLENOL) 325 MG CAPS     ammonium lactate (AMLACTIN) 12 % external cream     aspirin (SM ASPIRIN ADULT LOW STRENGTH) 81 MG EC tablet     BD VIKTORIA U/F 32G X 4 MM insulin pen needle     benzoyl peroxide 5 % external liquid     Continuous Blood Gluc  (FREESTYLE CLAUDIA 14 DAY READER) CECILIO     Continuous Blood Gluc Sensor (FREESTYLE CLAUDIA 2 SENSOR) MISC     empagliflozin (JARDIANCE) 10 MG TABS tablet     insulin aspart (NOVOLOG PEN) 100 UNIT/ML pen     insulin degludec (TRESIBA FLEXTOUCH) 100 UNIT/ML pen     ketoconazole (NIZORAL) 2 % external shampoo     ketorolac (ACULAR) 0.5 % ophthalmic solution     lamiVUDine (EPIVIR) 100 MG tablet     metoprolol succinate ER (TOPROL XL) 25 MG 24 hr tablet     Multiple Vitamin (TAB-A-GLADIS) TABS     mycophenolate (GENERIC EQUIVALENT) 250 MG capsule     ofloxacin (OCUFLOX) 0.3 % ophthalmic solution     order for DME     pantoprazole (PROTONIX) 40 MG EC tablet     prednisoLONE acetate (PRED FORTE) 1 % ophthalmic suspension     predniSONE (DELTASONE) 5 MG tablet     rosuvastatin (CRESTOR) 5 MG tablet     tamsulosin (FLOMAX) 0.4 MG capsule     vitamin D3 (VITAMIN D3) 50 mcg (2000 units) tablet     Current Facility-Administered Medications   Medication     aflibercept (EYLEA) injection prefilled syringe 2 mg     aflibercept (EYLEA) injection prefilled syringe 2 mg     Allergies   Allergen Reactions     Codeine Other (See Comments)     Cannot take due to liver  Cannot tolerate oral narcotics     Seasonal Allergies      Sneezing, coughing, runny and itchy eyes           Review of Systems   No acute c/o, fatigue is chronic per pt      Objective    /78 (BP Location: Right arm, Patient Position: Sitting, Cuff Size: Adult Regular)   Pulse 86   Wt 88.5 kg (195 lb)    SpO2 98%   BMI 28.80 kg/m    Body mass index is 28.8 kg/m .  Physical Exam   GENERAL: healthy, alert and no distress  MS: no gross musculoskeletal defects noted, no edema  PSYCH: mentation appears normal, affect normal/bright

## 2023-01-16 ENCOUNTER — TELEPHONE (OUTPATIENT)
Dept: OPHTHALMOLOGY | Facility: CLINIC | Age: 58
End: 2023-01-16

## 2023-01-16 DIAGNOSIS — Z94.4 LIVER TRANSPLANT RECIPIENT (H): ICD-10-CM

## 2023-01-17 NOTE — TELEPHONE ENCOUNTER
Patient is scheduled for surgery with Dr. Enriqueta Martin     Spoke with: Miller     Date of Surgery: 01/24     Location: [facility]:  [address] [place] [id number]     H&P was completed and in chart from 12/27/22     Post Op scheduled on 01/25 and 02/01     Surgery packet was sent to [facility]     Additional comments: Advised RN will call 1 - 3 business days prior with arrival time and instructions. Informed patient they will need an adult  and responsible adult to stay with for 24 hours following surgery

## 2023-01-18 DIAGNOSIS — Z95.1 S/P CABG (CORONARY ARTERY BYPASS GRAFT): ICD-10-CM

## 2023-01-18 DIAGNOSIS — Z94.4 LIVER TRANSPLANT RECIPIENT (H): ICD-10-CM

## 2023-01-18 DIAGNOSIS — E11.3293 TYPE 2 DIABETES MELLITUS WITH MILD NONPROLIFERATIVE RETINOPATHY OF BOTH EYES WITHOUT MACULAR EDEMA, UNSPECIFIED WHETHER LONG TERM INSULIN USE (H): ICD-10-CM

## 2023-01-18 RX ORDER — INSULIN DEGLUDEC 100 U/ML
INJECTION, SOLUTION SUBCUTANEOUS
Qty: 30 ML | Refills: 3 | Status: SHIPPED | OUTPATIENT
Start: 2023-01-18 | End: 2023-01-31

## 2023-01-18 ASSESSMENT — PATIENT HEALTH QUESTIONNAIRE - PHQ9
SUM OF ALL RESPONSES TO PHQ QUESTIONS 1-9: 8
SUM OF ALL RESPONSES TO PHQ QUESTIONS 1-9: 8
10. IF YOU CHECKED OFF ANY PROBLEMS, HOW DIFFICULT HAVE THESE PROBLEMS MADE IT FOR YOU TO DO YOUR WORK, TAKE CARE OF THINGS AT HOME, OR GET ALONG WITH OTHER PEOPLE: EXTREMELY DIFFICULT

## 2023-01-19 ENCOUNTER — VIRTUAL VISIT (OUTPATIENT)
Dept: BEHAVIORAL HEALTH | Facility: CLINIC | Age: 58
End: 2023-01-19
Payer: MEDICARE

## 2023-01-19 DIAGNOSIS — F31.31 BIPOLAR 1 DISORDER, DEPRESSED, MILD (H): Primary | ICD-10-CM

## 2023-01-19 DIAGNOSIS — H25.11 AGE-RELATED NUCLEAR CATARACT OF RIGHT EYE: Primary | ICD-10-CM

## 2023-01-19 PROCEDURE — 90832 PSYTX W PT 30 MINUTES: CPT | Mod: 95 | Performed by: PSYCHOLOGIST

## 2023-01-19 RX ORDER — PANTOPRAZOLE SODIUM 40 MG/1
40 TABLET, DELAYED RELEASE ORAL
Qty: 90 TABLET | Refills: 3 | Status: SHIPPED | OUTPATIENT
Start: 2023-01-19 | End: 2023-03-20

## 2023-01-19 RX ORDER — PANTOPRAZOLE SODIUM 40 MG/1
40 TABLET, DELAYED RELEASE ORAL
Qty: 90 TABLET | Refills: 0 | OUTPATIENT
Start: 2023-01-19

## 2023-01-19 ASSESSMENT — ENCOUNTER SYMPTOMS
DEPRESSION: 1
HALLUCINATIONS: 0
EYE IRRITATION: 1
FEVER: 0
EYE REDNESS: 0
EYE PAIN: 0
PANIC: 0
INCREASED ENERGY: 0
NECK PAIN: 0
EYE WATERING: 0
WEIGHT GAIN: 1
POLYDIPSIA: 0
DOUBLE VISION: 0
INSOMNIA: 0
MUSCLE CRAMPS: 0
FATIGUE: 1
ARTHRALGIAS: 0
POLYPHAGIA: 0
ALTERED TEMPERATURE REGULATION: 0
STIFFNESS: 0
DECREASED APPETITE: 0
NERVOUS/ANXIOUS: 0
MUSCLE WEAKNESS: 0
NIGHT SWEATS: 0
JOINT SWELLING: 0
BACK PAIN: 1
DECREASED CONCENTRATION: 0
MYALGIAS: 1
WEIGHT LOSS: 0
CHILLS: 0

## 2023-01-19 NOTE — PROGRESS NOTES
[facility]: Integrated Behavioral Health  January 19, 2023        Behavioral Health Clinician Progress Note    Patient Name: Frandy Workman           Service Type: Video visit      Service Location:  Video visit      Session Start Time: 3:03  Session End Time:  3:20      Session Length: 16 - 37      Attendees: Patient    Visit Activities (Refresh list every visit): [facility] [place] Only     Service Modality:  Video Visit:      Provider verified identity through the following two step process.  Patient provided:  Patient photo and Patient is known previously to provider    Telemedicine Visit: The patient's condition can be safely assessed and treated via synchronous audio and visual telemedicine encounter.      Reason for Telemedicine Visit: Services only offered telehealth    Originating Site (Patient Location): Patient's home    Distant Site (Provider Location): Provider Remote Setting- Home Office    Consent:  The patient/guardian has verbally consented to: the potential risks and benefits of telemedicine (video visit) versus in person care; bill my insurance or make self-payment for services provided; and responsibility for payment of non-covered services.     Patient would like the video invitation sent by:  My Chart    Mode of Communication:  Video Conference via [facility]    Distant Location (Provider):  Off-site    As the provider I attest to compliance with applicable laws and regulations related to telemedicine.      Diagnostic Assessment Date:  12/03/2020  Treatment Plan Review Date:  4/19/2023  See Flowsheets for today's PHQ-9 and LIZZETTE-7 results  Previous PHQ-9:   PHQ-9 SCORE 10/19/2022 11/21/2022 1/18/2023   PHQ-9 Total Score MyChart 2 (Minimal depression) 4 (Minimal depression) 8 (Mild depression)   PHQ-9 Total Score 2 4 8     Previous LIZZETTE-7:   LIZZETTE-7 SCORE 12/29/2020 4/7/2021 9/30/2021   Total Score 8 (mild anxiety) 9 (mild anxiety) -   Total Score 8 9 10       Extended Session (60+  minutes): No  Interactive Complexity: No  Crisis: No    Treatment Objective(s) Addressed in This Session:  Target Behavior(s): disease management/lifestyle changes  related to adaptive approaches to managing anxious distress and mood difficulties    Depressed mood: Increase interest, pleasure in doing things.  Diabetes Management    Current Stressors / Issues:  [facility] met with Miller today for a follow-up. Brief session with Miller today, mostly to check-in, see if he needed additional support/guidance on treatment of his emotional health. States doing well though notes today difficulties managing blood sugar levels. Reports medicine helps, though he wants to increase activity levels, decrease the amount of time he sleeps. Miller wants to work out five days per week, though we agreed to start small with this goal, perhaps starting with a five minute walk to and from the gym for a while, then working out one day per week. He identified this goal as more manageable for him. He also provides updates about his care; he is going forward with cataract surgery and successfully established with a new PCP, which this writer supported.     Answers for HPI/ROS submitted by the patient on 7/12/2022  If you checked off any problems, how difficult have these problems made it for you to do your work, take care of things at home, or get along with other people?: Somewhat difficult  PHQ9 TOTAL SCORE: 4    Answers for HPI/ROS submitted by the patient on 9/8/2022  If you checked off any problems, how difficult have these problems made it for you to do your work, take care of things at home, or get along with other people?: Somewhat difficult  PHQ9 TOTAL SCORE: 3    Answers for HPI/ROS submitted by the patient on 11/21/2022  If you checked off any problems, how difficult have these problems made it for you to do your work, take care of things at home, or get along with other people?: Very difficult  PHQ9 TOTAL SCORE: 4      Answers for HPI/ROS  submitted by the patient on 1/18/2023  If you checked off any problems, how difficult have these problems made it for you to do your work, take care of things at home, or get along with other people?: Extremely difficult  PHQ9 TOTAL SCORE: 8        Progress on Treatment Objective(s) / Homework:  Stable - ACTION (Actively working towards change); Intervened by reinforcing change plan / affirming steps taken      Solution-Focused Therapy    Explore patterns in patient's relationships and discuss options for new behaviors.    Explore patterns in patient's actions and choices and discuss options for new behaviors.    Behavioral Activation    Discuss steps patient can take to become more involved in meaningful activity.    Identify barriers to these activities and explore possible solutions.      Answers for HPI/ROS submitted by the patient on 3/21/2022  If you checked off any problems, how difficult have these problems made it for you to do your work, take care of things at home, or get along with other people?: Not difficult at all  PHQ9 TOTAL SCORE: 6      Answers for HPI/ROS submitted by the patient on 2/8/2022  If you checked off any problems, how difficult have these problems made it for you to do your work, take care of things at home, or get along with other people?: Somewhat difficult  PHQ9 TOTAL SCORE: 4      Answers for HPI/ROS submitted by the patient on 4/7/2021   LIZZETTE 7 TOTAL SCORE: 9  If you checked off any problems, how difficult have these problems made it for you to do your work, take care of things at home, or get along with other people?: Very difficult  PHQ9 TOTAL SCORE: 7*    *No SI    Answers for HPI/ROS submitted by the patient on 10/13/2020   If you checked off any problems, how difficult have these problems made it for you to do your work, take care of things at home, or get along with other people?: Somewhat difficult  PHQ9 TOTAL SCORE: 8*  LIZZETTE 7 TOTAL SCORE: 6      *No SI     Answers for  HPI/ROS submitted by the patient on 12/29/2020   If you checked off any problems, how difficult have these problems made it for you to do your work, take care of things at home, or get along with other people?: Somewhat difficult  PHQ9 TOTAL SCORE: 5*  LIZZETTE 7 TOTAL SCORE: 8    *No SI     Answers for HPI/ROS submitted by the patient on 11/21/2022  If you checked off any problems, how difficult have these problems made it for you to do your work, take care of things at home, or get along with other people?: Very difficult  PHQ9 TOTAL SCORE: 4          Care Plan review completed: no    Medication Review:  No changes to current psychiatric medication(s)     Current Outpatient Medications   Medication     Acetaminophen (TYLENOL) 325 MG CAPS     ammonium lactate (AMLACTIN) 12 % external cream     aspirin (SM ASPIRIN ADULT LOW STRENGTH) 81 MG EC tablet     BD VIKTORIA U/F 32G X 4 MM insulin pen needle     benzoyl peroxide 5 % external liquid     Continuous Blood Gluc  (FREESTYLE CLAUDIA 14 DAY READER) CECILIO     Continuous Blood Gluc Sensor (FREESTYLE CLAUDIA 2 SENSOR) MISC     empagliflozin (JARDIANCE) 10 MG TABS tablet     insulin aspart (NOVOLOG PEN) 100 UNIT/ML pen     insulin degludec (TRESIBA FLEXTOUCH) 100 UNIT/ML pen     ketoconazole (NIZORAL) 2 % external shampoo     ketorolac (ACULAR) 0.5 % ophthalmic solution     lamiVUDine (EPIVIR) 100 MG tablet     metoprolol succinate ER (TOPROL XL) 25 MG 24 hr tablet     Multiple Vitamin (TAB-A-GLADIS) TABS     mycophenolate (GENERIC EQUIVALENT) 250 MG capsule     ofloxacin (OCUFLOX) 0.3 % ophthalmic solution     order for DME     pantoprazole (PROTONIX) 40 MG EC tablet     prednisoLONE acetate (PRED FORTE) 1 % ophthalmic suspension     predniSONE (DELTASONE) 5 MG tablet     rosuvastatin (CRESTOR) 5 MG tablet     tamsulosin (FLOMAX) 0.4 MG capsule     vitamin D3 (VITAMIN D3) 50 mcg (2000 units) tablet     Current Facility-Administered Medications   Medication     aflibercept  (EYLEA) injection prefilled syringe 2 mg     aflibercept (EYLEA) injection prefilled syringe 2 mg         Medication Compliance:  Yes    Changes in Health Issues:   None reported    Chemical Use Review:   Substance Use: Chemical use reviewed, no active concerns identified      Tobacco Use: No current tobacco use.         Assessment: Current Emotional / Mental Status (status of significant symptoms):  Risk status (Self / Other harm or suicidal ideation)  Patient denies a history of suicidal ideation, suicide attempts, self-injurious behavior, homicidal ideation, homicidal behavior and and other safety concerns     Patient denies current fears or concerns for personal safety.  Patient denies current or recent suicidal ideation or behaviors.  Patient denies current or recent homicidal ideation or behaviors.  Patient denies current or recent self injurious behavior or ideation.  Patient denies other safety concerns.     A safety and risk management plan has not been developed at this time, however patient was encouraged to call [place] [phone] should there be a change in any of these risk factors.    [place] Suicide Severity Rating Scale (Short Version)  [place] Suicide Severity Rating (Short Version) 12/2/2019 12/10/2019 6/11/2020 7/1/2020 7/12/2021 1/10/2022 1/3/2023   Over the past 2 weeks have you felt down, depressed, or hopeless? yes yes no no no no no   Over the past 2 weeks have you had thoughts of killing yourself? yes no no no no no no   Comments lots of stressors, has thought about not taking meds - - - - - -   Have you ever attempted to kill yourself? no no no no no no no   Q1 Wished to be Dead (Past Month) other (see comments) no - - - - no   Comments why did i do this surgery - - - - - -   Q2 Suicidal Thoughts (Past Month) no no - - - - no   Comments wishes he wouldn't have gone through surgery - - - - - -   Q3 Suicidal Thought Method no no - - - - no   Q4 Suicidal Intent without Specific Plan no no  - - - - no   Q5 Suicide Intent with Specific Plan no no - - - - no   Q6 Suicide Behavior (Lifetime) no no - - - - no   Level of Risk per Screen - - - - - - low risk         Appearance:   Appropriate   Eye Contact:   Good   Psychomotor Behavior: TD evident   Attitude:   Cooperative  Interested Friendly Pleasant  Orientation:   All  Speech   Rate / Production: Normal/ Responsive   Volume:  Normal   Mood:    Depressed ; improving  Affect:    Appropriate    Thought Content:  Clear   Thought Form:  Goal Directed  Logical   Insight:    Good     Diagnoses:  1. Bipolar 1 disorder, depressed, mild (H)          Collateral Reports Completed:  Not Applicable    Plan: (Homework, other):  Continue with this writer for individual psychotherapy in one wks. He will continue to work on managing depression and anxiety through achievable behavioral activation ideas. Information about behavioral activation provided  Today; he plans to increase physical activity by walking each day, leading up to exercise.  No CD issues.     Joseph Murillo, RED  January 19, 2023      ______________________________________________________________________                                            Individual Treatment Plan    Patient's Name: Frandy Workman  YOB: 1965    Date of Creation: 3/1/2022  Date Treatment Plan Last Reviewed/Revised: 1/19/2023    DSM5 Diagnoses: 296.51 Bipolar I Disorder Current or Most Recent Episode Depressed, Mild or 300.02 (F41.1) Generalized Anxiety Disorder  Psychosocial / Contextual Factors: Post Solid Organ Tx  PROMIS (reviewed every 90 days): 4    Referral / Collaboration:  Referral to another professional/service is not indicated at this time..    Anticipated number of session for this episode of care: 4-6  Anticipation frequency of session: Every other week  Anticipated Duration of each session: 38-52 minutes  Treatment plan will be reviewed in 90 days or when goals have been changed.  "      MeasurableTreatment Goal(s) related to diagnosis / functional impairment(s)  Goal 1: Patient will experience a reduction in depressive symptoms along with a corresponding increase in positive emotion and life satisfaction.      Objective #A (Patient Action)    Patient will Increase interest, engagement, and pleasure in doing things.  Status: Continued - Date(s): 1/19/2023    Intervention(s)  Therapist will help patient identify pleasant and mastery oriented events that elicit positive, relaxed mood.    Objective #B  Patient will Decrease frequency and intensity of feeling down, depressed, hopeless.  Status: Continued - Date(s): 1/19/2023    Intervention(s)  Therapist will introduce patient to cognitive-behavioral and acceptance and commitment therapy topics aimed to help reduce depression and anxiety    Objective #C  Patient will Identify negative self-talk and behaviors: challenge core beliefs, myths, and actions  Improve concentration, focus, and mindfulness in daily activities .  Status: Continued - Date(s): COMPLETE    Intervention(s)  Therapist will help patient identify and manage negative self-talk and automatic thoughts; introduce patient to cognitive distortions; help patient develop cognitive diffusion techniques      Goal 2: Patient will experience a reduction in anxious symptoms, along with a corresponding increase in relaxed emotional states and life satisfaction.      Objective #A (Patient Action)  Patient will use cognitive-behavioral and thought diffusion strategies identified in therapy to challenge anxious thoughts.    Status: Continued - Date(s): COMPLETE    Intervention(s)  Therapist will utilize CBT and ACT ideas to help patient challenge anxious thoughts and reduce intensity/duration of anxious distress    Objective #B  Patient will use \"worry time\" each day for 15 minutes of scheduled worry and then defer obsessive or anxious thinking until the next structured worry time.    Status: " Continued - Date(s): COMPLETE    Intervention(s)  Therapist will teach patient how to effectively utilize worry time and/or thought logs/journals each day and incorporate more relaxation behaviors into their routine.    Objective #C  Patient will identify the stressors which contribute to feelings of anxiety  Patient will increase engagement in adaptive coping skills and recreational activities, such as exercise and healthy socialization, to manage distress.  Status: Continued - Date(s): COMPLETE    Intervention(s)  Therapist will help patient identify triggers/situational factors that contribute to anxiety and behavioral skills aimed to manage anxious distress.      Goal 3: Patiient will work toward adaptively managing bipolar-related depression and manic episodes      Objective #A (Patient Action)    Status: Continued - Date(s): COMPLETE    Patient will identify 2-3 signs or signals of emerging mood instability.    Intervention(s)  Therapist will provide educational materials on bipolar disorder and help identifying triggers for mood instability.    Objective #B  Patient will identify 2-3 strategies for more effectively managing Bipolar Disorder.    Status: Continued - Date(s): COMPLETE    Intervention(s)  Therapist will teach emotional recognition/identification. Skills aimed to effectively manage bipolar disorder.      Other Possible Therapeutic Intervention(s):    Psycho-education regarding mental health diagnoses and treatment options    Supportive Therapy    Provide affirmations, reflections, and establish working rapport    Emphasize and reflect on strength of therapeutic relationship    Skills training    Explore skills useful to client in current situation.    Skills include assertiveness, communication, conflict management, problem-solving, relaxation, etc.    Solution-Focused Therapy    Explore patterns in patient's relationships and discuss options for new behaviors.    Explore patterns in patient's  actions and choices and discuss options for new behaviors.    Cognitive-behavioral Therapy    Discuss common cognitive distortions, identify them in patient's life.    Explore ways to challenge, replace, and act against these cognitions.    Acceptance and Commitment Therapy    Explore and identify important values in patient's life.    Discuss ways to commit to behavioral activation around these values.    Psychodynamic psychotherapy    Discuss patient's emotional dynamics and issues and how they impact behaviors.    Explore patient's history of relationships and how they impact present behaviors.    Explore how to work with and make changes in these schemas and patterns.    Narrative Therapy    Explore the patient's story of his/her life from his/her perspective.    Explore alternate ways of understanding their experience, identifying exceptions, developing new themes.    Interpersonal Psychotherapy    Explore patterns in relationships that are effective or ineffective at helping patient reach their goals, find satisfying experience.    Discuss new patterns or behaviors to engage in for improved social functioning.    Behavioral Activation    Discuss steps patient can take to become more involved in meaningful activity.    Identify barriers to these activities and explore possible solutions.    Mindfulness-Based Strategies    Discuss skills based on development and application of mindfulness.    Skills drawn from compassion-focused therapy, dialectical behavior therapy, mindfulness-based stress reduction, mindfulness-based cognitive therapy, etc.      Patient has reviewed and agreed to the above plan.      Joseph Murillo Psy.D, LP   Behavioral Health Clinician   -[facility]     1/19/2023

## 2023-01-20 ENCOUNTER — DOCUMENTATION ONLY (OUTPATIENT)
Dept: FAMILY MEDICINE | Facility: CLINIC | Age: 58
End: 2023-01-20
Payer: MEDICARE

## 2023-01-20 NOTE — PROGRESS NOTES
Type of Form Received: Log term disability    Form Received (Date) 1/20/23   Form Filled out Yes 1/20/23   Placed in provider folder Yes

## 2023-01-23 ENCOUNTER — ANESTHESIA EVENT (OUTPATIENT)
Dept: SURGERY | Facility: AMBULATORY SURGERY CENTER | Age: 58
End: 2023-01-23
Payer: MEDICARE

## 2023-01-24 ENCOUNTER — HOSPITAL ENCOUNTER (OUTPATIENT)
Facility: AMBULATORY SURGERY CENTER | Age: 58
Discharge: HOME | End: 2023-01-24
Attending: OPHTHALMOLOGY
Payer: MEDICARE

## 2023-01-24 ENCOUNTER — ANESTHESIA (OUTPATIENT)
Dept: SURGERY | Facility: AMBULATORY SURGERY CENTER | Age: 58
End: 2023-01-24
Payer: MEDICARE

## 2023-01-24 VITALS
TEMPERATURE: 97.3 F | WEIGHT: 195 LBS | RESPIRATION RATE: 16 BRPM | SYSTOLIC BLOOD PRESSURE: 120 MMHG | OXYGEN SATURATION: 100 % | HEART RATE: 87 BPM | DIASTOLIC BLOOD PRESSURE: 66 MMHG | BODY MASS INDEX: 28.88 KG/M2 | HEIGHT: 69 IN

## 2023-01-24 DIAGNOSIS — H25.12 AGE-RELATED NUCLEAR CATARACT OF LEFT EYE: Primary | ICD-10-CM

## 2023-01-24 DIAGNOSIS — Z48.810 AFTERCARE FOLLOWING SURGERY OF A SENSE ORGAN: ICD-10-CM

## 2023-01-24 DIAGNOSIS — E11.3293 TYPE 2 DIABETES MELLITUS WITH MILD NONPROLIFERATIVE RETINOPATHY OF BOTH EYES WITHOUT MACULAR EDEMA, UNSPECIFIED WHETHER LONG TERM INSULIN USE (H): ICD-10-CM

## 2023-01-24 LAB — GLUCOSE BLDC GLUCOMTR-MCNC: 181 MG/DL (ref 70–99)

## 2023-01-24 PROCEDURE — 82962 GLUCOSE BLOOD TEST: CPT | Performed by: PATHOLOGY

## 2023-01-24 PROCEDURE — 66982 XCAPSL CTRC RMVL CPLX WO ECP: CPT | Mod: 79 | Performed by: OPHTHALMOLOGY

## 2023-01-24 PROCEDURE — 66982 XCAPSL CTRC RMVL CPLX WO ECP: CPT | Mod: RT

## 2023-01-24 DEVICE — IMPLANTABLE DEVICE: Type: IMPLANTABLE DEVICE | Site: EYE | Status: FUNCTIONAL

## 2023-01-24 RX ORDER — PREDNISOLONE ACETATE 10 MG/ML
1 SUSPENSION/ DROPS OPHTHALMIC 4 TIMES DAILY
Qty: 5 ML | Refills: 1 | Status: SHIPPED | OUTPATIENT
Start: 2023-01-24 | End: 2023-07-10

## 2023-01-24 RX ORDER — KETOROLAC TROMETHAMINE 5 MG/ML
1 SOLUTION OPHTHALMIC 4 TIMES DAILY
Qty: 10 ML | Refills: 0 | Status: SHIPPED | OUTPATIENT
Start: 2023-01-24 | End: 2023-07-05

## 2023-01-24 RX ORDER — LIDOCAINE HYDROCHLORIDE 20 MG/ML
INJECTION, SOLUTION INFILTRATION; PERINEURAL PRN
Status: DISCONTINUED | OUTPATIENT
Start: 2023-01-24 | End: 2023-01-24

## 2023-01-24 RX ORDER — OFLOXACIN 3 MG/ML
1 SOLUTION/ DROPS OPHTHALMIC 4 TIMES DAILY
Qty: 5 ML | Refills: 0 | Status: SHIPPED | OUTPATIENT
Start: 2023-01-24 | End: 2023-03-20

## 2023-01-24 RX ORDER — FENTANYL CITRATE 50 UG/ML
25 INJECTION, SOLUTION INTRAMUSCULAR; INTRAVENOUS EVERY 5 MIN PRN
Status: DISCONTINUED | OUTPATIENT
Start: 2023-01-24 | End: 2023-01-25 | Stop reason: HOSPADM

## 2023-01-24 RX ORDER — PROPOFOL 10 MG/ML
INJECTION, EMULSION INTRAVENOUS CONTINUOUS PRN
Status: DISCONTINUED | OUTPATIENT
Start: 2023-01-24 | End: 2023-01-24

## 2023-01-24 RX ORDER — ONDANSETRON 4 MG/1
4 TABLET, ORALLY DISINTEGRATING ORAL EVERY 30 MIN PRN
Status: DISCONTINUED | OUTPATIENT
Start: 2023-01-24 | End: 2023-01-25 | Stop reason: HOSPADM

## 2023-01-24 RX ORDER — TETRACAINE HYDROCHLORIDE 5 MG/ML
SOLUTION OPHTHALMIC PRN
Status: DISCONTINUED | OUTPATIENT
Start: 2023-01-24 | End: 2023-01-24 | Stop reason: HOSPADM

## 2023-01-24 RX ORDER — PROPARACAINE HYDROCHLORIDE 5 MG/ML
1 SOLUTION/ DROPS OPHTHALMIC ONCE
Status: DISCONTINUED | OUTPATIENT
Start: 2023-01-24 | End: 2023-01-25 | Stop reason: HOSPADM

## 2023-01-24 RX ORDER — HYDROMORPHONE HYDROCHLORIDE 1 MG/ML
0.4 INJECTION, SOLUTION INTRAMUSCULAR; INTRAVENOUS; SUBCUTANEOUS EVERY 5 MIN PRN
Status: DISCONTINUED | OUTPATIENT
Start: 2023-01-24 | End: 2023-01-25 | Stop reason: HOSPADM

## 2023-01-24 RX ORDER — FENTANYL CITRATE 50 UG/ML
50 INJECTION, SOLUTION INTRAMUSCULAR; INTRAVENOUS EVERY 5 MIN PRN
Status: DISCONTINUED | OUTPATIENT
Start: 2023-01-24 | End: 2023-01-25 | Stop reason: HOSPADM

## 2023-01-24 RX ORDER — PROPOFOL 10 MG/ML
INJECTION, EMULSION INTRAVENOUS PRN
Status: DISCONTINUED | OUTPATIENT
Start: 2023-01-24 | End: 2023-01-24

## 2023-01-24 RX ORDER — PROPARACAINE HYDROCHLORIDE 5 MG/ML
1 SOLUTION/ DROPS OPHTHALMIC ONCE
Status: COMPLETED | OUTPATIENT
Start: 2023-01-24 | End: 2023-01-24

## 2023-01-24 RX ORDER — SODIUM CHLORIDE, SODIUM LACTATE, POTASSIUM CHLORIDE, CALCIUM CHLORIDE 600; 310; 30; 20 MG/100ML; MG/100ML; MG/100ML; MG/100ML
INJECTION, SOLUTION INTRAVENOUS CONTINUOUS
Status: DISCONTINUED | OUTPATIENT
Start: 2023-01-24 | End: 2023-01-25 | Stop reason: HOSPADM

## 2023-01-24 RX ORDER — DEXAMETHASONE SODIUM PHOSPHATE 4 MG/ML
INJECTION, SOLUTION INTRA-ARTICULAR; INTRALESIONAL; INTRAMUSCULAR; INTRAVENOUS; SOFT TISSUE PRN
Status: DISCONTINUED | OUTPATIENT
Start: 2023-01-24 | End: 2023-01-24 | Stop reason: HOSPADM

## 2023-01-24 RX ORDER — LIDOCAINE 40 MG/G
CREAM TOPICAL
Status: DISCONTINUED | OUTPATIENT
Start: 2023-01-24 | End: 2023-01-25 | Stop reason: HOSPADM

## 2023-01-24 RX ORDER — CYCLOPENTOLAT/TROPIC/PHENYLEPH 1%-1%-2.5%
1 DROPS (EA) OPHTHALMIC (EYE)
Status: DISCONTINUED | OUTPATIENT
Start: 2023-01-24 | End: 2023-01-25 | Stop reason: HOSPADM

## 2023-01-24 RX ORDER — BALANCED SALT SOLUTION 6.4; .75; .48; .3; 3.9; 1.7 MG/ML; MG/ML; MG/ML; MG/ML; MG/ML; MG/ML
SOLUTION OPHTHALMIC PRN
Status: DISCONTINUED | OUTPATIENT
Start: 2023-01-24 | End: 2023-01-24 | Stop reason: HOSPADM

## 2023-01-24 RX ORDER — ONDANSETRON 2 MG/ML
4 INJECTION INTRAMUSCULAR; INTRAVENOUS EVERY 30 MIN PRN
Status: DISCONTINUED | OUTPATIENT
Start: 2023-01-24 | End: 2023-01-25 | Stop reason: HOSPADM

## 2023-01-24 RX ORDER — HYDROMORPHONE HYDROCHLORIDE 1 MG/ML
0.2 INJECTION, SOLUTION INTRAMUSCULAR; INTRAVENOUS; SUBCUTANEOUS EVERY 5 MIN PRN
Status: DISCONTINUED | OUTPATIENT
Start: 2023-01-24 | End: 2023-01-25 | Stop reason: HOSPADM

## 2023-01-24 RX ORDER — ACETAMINOPHEN 325 MG/1
975 TABLET ORAL ONCE
Status: COMPLETED | OUTPATIENT
Start: 2023-01-24 | End: 2023-01-24

## 2023-01-24 RX ORDER — CYCLOPENTOLAT/TROPIC/PHENYLEPH 1%-1%-2.5%
1 DROPS (EA) OPHTHALMIC (EYE)
Status: COMPLETED | OUTPATIENT
Start: 2023-01-24 | End: 2023-01-24

## 2023-01-24 RX ADMIN — Medication 1 DROP: at 06:54

## 2023-01-24 RX ADMIN — ACETAMINOPHEN 975 MG: 325 TABLET ORAL at 06:43

## 2023-01-24 RX ADMIN — PROPOFOL 30 MG: 10 INJECTION, EMULSION INTRAVENOUS at 08:01

## 2023-01-24 RX ADMIN — PROPOFOL 20 MG: 10 INJECTION, EMULSION INTRAVENOUS at 08:04

## 2023-01-24 RX ADMIN — PROPARACAINE HYDROCHLORIDE 1 DROP: 5 SOLUTION/ DROPS OPHTHALMIC at 06:45

## 2023-01-24 RX ADMIN — PROPOFOL 25 MCG/KG/MIN: 10 INJECTION, EMULSION INTRAVENOUS at 08:19

## 2023-01-24 RX ADMIN — PROPOFOL 50 MG: 10 INJECTION, EMULSION INTRAVENOUS at 08:03

## 2023-01-24 RX ADMIN — LIDOCAINE HYDROCHLORIDE 60 MG: 20 INJECTION, SOLUTION INFILTRATION; PERINEURAL at 08:01

## 2023-01-24 RX ADMIN — SODIUM CHLORIDE, SODIUM LACTATE, POTASSIUM CHLORIDE, CALCIUM CHLORIDE: 600; 310; 30; 20 INJECTION, SOLUTION INTRAVENOUS at 07:08

## 2023-01-24 RX ADMIN — PROPOFOL 50 MG: 10 INJECTION, EMULSION INTRAVENOUS at 08:07

## 2023-01-24 RX ADMIN — Medication 1 DROP: at 07:07

## 2023-01-24 RX ADMIN — Medication 1 DROP: at 07:12

## 2023-01-24 RX ADMIN — PROPOFOL 50 MG: 10 INJECTION, EMULSION INTRAVENOUS at 08:02

## 2023-01-24 NOTE — PROGRESS NOTES
Pt has continuous blood sugar monitor in place: blood sugar per pt 141. Pt tolerating PO without difficulty.

## 2023-01-24 NOTE — ANESTHESIA CARE TRANSFER NOTE
Patient: Frandy Workman    Procedure: Procedure(s):  PHACOEMULSIFICATION, COMPLEX CATARACT, WITH INTRAOCULAR LENS IMPLANT WITH TRYPAN RIGHT EYE       Diagnosis: Nuclear senile cataract of right eye [H25.11]  Diagnosis Additional Information: No value filed.    Anesthesia Type:   MAC     Note:    Oropharynx: oropharynx clear of all foreign objects and spontaneously breathing  Level of Consciousness: awake  Oxygen Supplementation: room air    Independent Airway: airway patency satisfactory and stable  Dentition: dentition unchanged  Vital Signs Stable: post-procedure vital signs reviewed and stable  Report to RN Given: handoff report given  Patient transferred to: Phase II  Comments: Uneventful transport   Report to RN  Exchanging well; color natl  Pt responds appropriately to command  IV patent  Lips/teeth/dentition as preop status  Questions answered    Handoff Report: Identifed the Patient, Identified the Reponsible Provider, Reviewed the pertinent medical history, Discussed the surgical course, Reviewed Intra-OP anesthesia mangement and issues during anesthesia, Set expectations for post-procedure period and Allowed opportunity for questions and acknowledgement of understanding      Vitals:  Vitals Value Taken Time   /66 01/24/23 0906   Temp 36.2  C (97.2  F) 01/24/23 0906   Pulse 83 01/24/23 0906   Resp 16 01/24/23 0906   SpO2 99 % 01/24/23 0906       Electronically Signed By: SHANIQUE ARAGON CRNA  January 24, 2023  9:09 AM

## 2023-01-24 NOTE — DISCHARGE INSTRUCTIONS
Dr. Enriqueta Martin  [facility]  [phone]  Post Operative Cataract Instructions    If you have a gauze eye patch on, please do not remove it until it is removed by your physician at your first post-operative visit.  You will start your eye drops the next day.    Wear the clear eye shield when sleeping for protection for 5 days.    Do not rub the operated eye.    Light sensitivity may be noticed. Sunglasses may be worn for comfort.    Some discomfort and irritation may be noticed. Acetaminophen (Tylenol) or Ibuprofen (Advil) may be taken for discomfort. If pain persists please call Dr. Martin's office.    Keep the operated eye dry. You may wash your hair, bathe or shower, but keep the operated eye closed while doing so.     If you take glaucoma medications, bring them with you to the clinic on your first post operative visit.    Bring your prescribed eye drops with you to your scheduled post-operative appointment.    Use medication exactly as prescribed by your doctor. You may restart your regular home medications.     Call Dr. Martin's office at [phone] if any of the following should occur:    Any sudden vision changes, including decreased vision  Nausea or severe headache  Increase in pain not controlled  Signs of infection (pus, increasing redness or tenderness)  Severe sensitivity to light      [facility]  Home Care Following Anesthesia  For 24 hours after surgery:  Get plenty of rest.  A responsible adult must stay with you for at least 24 hours after you leave the surgery center.  Do not drive or use heavy equipment.  If you have weakness or tingling, don't drive or use heavy equipment until this feeling goes away.   Do not drink alcohol.   Avoid strenuous or risky activities.  Ask for help when climbing stairs.  You may feel lightheaded.  IF so, sit for a few minutes before standing.  Have someone help you get up.   If you have nausea (feel  sick to your stomach): Drink only clear liquids such as apple juice, ginger ale, broth or 7-Up.  Rest may also help.  Be sure to drink enough fluids.  Move to a regular diet as you feel able.   You may have a slight fever.  Call the doctor if your fever is over 100 F (37.7 C) (taken under the tongue) or lasts longer than 24 hours.  You may have a dry mouth, a sore throat, muscle aches or trouble sleeping. These should go away after 24 hours.  Do not make important or legal decisions.   It is recommended to avoid smoking.               Tips for taking pain medications  To get the best pain relief possible, remember these points:  Take pain medications as directed, before pain becomes severe.  Pain medication can upset your stomach: taking it with food may help.  Constipation is a common side effect of pain medication. Drink plenty of  fluids.  Eat foods high in fiber. Take a stool softener if recommended by your doctor or pharmacist.  Do not drink alcohol, drive or operate machinery while taking pain medications.  Ask about other ways to control pain, such as with heat, ice or relaxation.    Tylenol/Acetaminophen Consumption  To help encourage the safe use of acetaminophen, the makers of TYLENOL  have lowered the maximum daily dose for single-ingredient Extra Strength TYLENOL  (acetaminophen) products sold in the [place] from 8 pills per day (4,000 mg) to 6 pills per day (3,000 mg). The dosing interval has also changed from 2 pills every 4-6 hours to 2 pills every 6 hours.  If you feel your pain relief is insufficient, you may take Tylenol/Acetaminophen in addition to your narcotic pain medication.   Be careful not to exceed 3,000 mg of Tylenol/Acetaminophen in a 24 hour period from all sources.  If you are taking extra strength Tylenol/acetaminophen (500 mg), the maximum dose is 6 tablets in 24 hours.  If you are taking regular strength acetaminophen (325 mg), the maximum dose is 9 tablets in 24 hours.    Call a doctor  for any of the following:  Signs of infection (fever, growing tenderness at the surgery site, a large amount of drainage or bleeding, severe pain, foul-smelling drainage, redness, swelling).  It has been over 8 to 10 hours since surgery and you are still not able to urinate (pass water).  Headache for over 24 hours.  Numbness, tingling or weakness the day after surgery (if you had spinal anesthesia).  Signs of Covid-19 infection (temperature over 100 degrees, shortness of breath, cough, loss of taste/smell, generalized body aches, persistent headache, chills, sore throat, nausea/vomiting/diarrhea)  Your doctor is:       Dr. Enriqueta Martin, Ophthalmology: [phone]               Or dial [phone] and ask for the resident on call for:  Ophthalmology  For emergency care, call the:  [place] [facility]:  [phone] (TTY for hearing impaired: [phone])  Tylenol 975 mg given at 645 am.  Next available dose at 145 pm.

## 2023-01-24 NOTE — OP NOTE
SURGEON:   SANDRA GALLO MD  Assistant surgeon: TOMÁS BEARD MD, MPH  PREOPERATIVE DIAGNOSIS:   1. visually significant nuclear sclerosis complex cataract, RIGHT EYE  2. Poor pupil dilation  POSTOPERATIVE DIAGNOSIS: same  NAME OF THE PROCEDURE: Phacoemulsification with intraocular lens implantation, RIGHT EYE  ANESTHESIA: Monitored anesthesia care and peribulbar block   COMPLICATIONS: none  INDICATIONS: Frandy Workman is a 57 year old with diagnosis of visually significant cataract, here for cataract surgery    DESCRIPTION OF THE PROCEDURE:  The patient was taken to the operative room where intravenous sedation was administered and a modified peribulbar block consisting of a 1:1 mixture of 2%lidocaine and 0.75% marcaine with epinephrine and wydase, was administered to the operative eye with adequate anesthesia and akinesia.    The operative eye was prepped and draped in the usual sterile surgical fashion for ophthalmic surgery, including the installation of one drop of 5% Povidone Iodine.  A sterile drape was placed over the face and body and a lid speculum was inserted.      With the use of a Supersharp blade and 0.12 forceps, a paracentesis was created at the 11 o'clock position, and air was injected into the Anterior chamber. Next, tripan blue was used stain the anterior capsule. The anterior chamber was irrigated with lidocaine. Next, viscoelastic was injected into the anterior chamber using a canula.  A 2.5 mm keratome was then used to construct a clear corneal incision at the 8 o'clock position. A 7.00mm Malyugin ring was placed. Using Utrata forceps and cystotome needle, a continuous curvilinear capsulorrhexis was created and hydrodissection was undertaken with the use of BSS.  The nucleus was found to be freely mobile and then removed by phacoemulsification using a modified technique.  The remaining elements of cortex were then removed with irrigation/aspiration.  An IOL,was injected into the  capsular bag and was rotated into a good position with a Sinskey hook. The Malyugin ring was removed. Next, the remaining elements of viscoelastic were then removed with irrigation/aspiration one 10- nylon suture was placed  at the wound incision.  The lid speculum was removed.  Subconjunctival injection of Dexamethasone and Ancef were administered.  The eye was cleaned with wet and dry gauze. Maxitrol ointment was placed on the eye.  A patch and [place] shield were placed over the eye.     Implant Name Type Inv. Item Serial No.  Lot No. LRB No. Used Action   LENS [id number] [employer] UV ASPHERIC BICONVEX IOL - [id number] Lens/Eye Implant LENS [id number] [employer] UV ASPHERIC BICONVEX IOL [id number] [employer]  Right 1 Implanted       The surgery was assisted by Dr. Jorge Anegles. Due to the delicate and complex nature of this surgery, an assistant was required. He assisted with Cataract extraction. I was present for the entire surgery.

## 2023-01-24 NOTE — TELEPHONE ENCOUNTER
PRESCRIPTIONS MUST BE WRITTEN THIS WAY TO MAKE THEM MEDICARE COMPLIANT:     FREESTYLE CLAUDIA 2 SENSORS  SIG: Change every 14 days  QTY: 2 REFILLS: 5     -Prescriptions must be written after the clinical note date and will only be able to be used for 6 months from the date of the clinial notes. (We will be requesteding new clinical notes and prescriptions every 6 months to meet Medicare Guidelines.      CERTIFICATE OF MEDICAL NECESSITY REQUIREMENTS:     -Is needed every time the patient is trying to get a new .      -Must be filled out completely to be compliant.     ALL DOCUMENTS (INCLUDING CLINICAL NOTES) MUST BE SIGNED BY THE SAME DOCTOR     Please contact us at [phone] (this number is for clinics only) with any questions. Patients may call us at [phone].     Sincerely,   [facility]

## 2023-01-24 NOTE — PROGRESS NOTES
Outcome for 23 11:29 AM: Data uploaded on [employer], VF    Patient is showing 5/5 MNCM met.   Oriana Dickinson LPN     Frandy Workman  is being evaluated via a billable video visit.      How would you like to obtain your AVS? Jordan  For the video visit, send the invitation by: Send to e-mail at: [email or web address]  Will anyone else be joining your video visit? No      Start time: 10:38am  End time: 10:52am  Provider location- off site- home.   Patient location- off site- home.       HPI:     Frandy Workman is a 57 year old man here for follow up of type 2 diabetes complicated by nephropathy, retinopathy and neuropathy. 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  He usually sees Tish Toscano.  Today is the first time I am meeting him.  He reports that he has been having high glucose readings recently and he is not sure why.   He has been feeling more tired and not that hungry.  Not feeling himself.  He feels like he has been sleeping too much during the day.  No testing for COVID.  He has had no cough or SOB.  Otherwise feeling well.      Snacking-     Still staying up in the 200-220 range.  Increased to 5-10 units 4 times a day.  Not exercising because he can't     DM Regimen:   - Tresiba 32 units /day.   - Carbohydrate coverage to 1 unit:10 g of carbohydrate (5-10 units at a time).   - Correction Novolounit Novolo mg /dl >180     - Empagliflozin 10 mg daily (had JERONIMO/hypovolemias on higher dose).    Previous treatments:  Metformin- unsure why he went off. Kidneys?    Feeling tired, lethargic.  Starting to have reflux problems.     Recent glucose:              Diabetes Control:   Lab Results   Component Value Date    A1C 6.0 01/10/2022    A1C 6.8 2021    A1C 6.0 2020    A1C 6.3 2020    A1C 6.6  08/08/2018    A1C 6.5 06/09/2017    A1C 7.8 10/25/2016       Past Medical History:   Diagnosis Date     Anemia 2013     Arthritis      BPH (benign prostatic hyperplasia)      CAD (coronary artery disease) 4/1/2019     Cholelithiasis      Conductive hearing loss 08/16/2017     Depressive disorder 1986    Suffer effects throughout life     Gastroesophageal reflux disease 12/01/2014     HCC (hepatocellular carcinoma) (H) 1/22/2019     History of diabetic retinopathy 07/2018     HTN (hypertension)      HTN (hypertension) 11/20/2019     Hyperlipidemia      Liver cirrhosis secondary to ESTRADA (H)      Liver transplanted (H) 11/11/2019     Portal vein thrombosis 4/11/2019     Type II diabetes mellitus (H)        Past Surgical History:   Procedure Laterality Date     BYPASS GRAFT ARTERY CORONARY N/A 7/14/2021    Procedure: median sternotomy, on cardiopulmonary bypass, CORONARY ARTERY BYPASS GRAFT (CABG) x2 with left greater saphenous vein endoscopic harvest and left internal mammery artery harvest;  Surgeon: Tom Zapata MD;  Location: [facility] OR     COLONOSCOPY      2015     COLONOSCOPY N/A 12/6/2019    Procedure: COLONOSCOPY, WITH POLYPECTOMY AND BIOPSY;  Surgeon: Adam Morton MD;  Location:  GI     CV CENTRAL VENOUS CATHETER PLACEMENT N/A 7/12/2021    Procedure: Central Venous Catheter Placement;  Surgeon: Fermin Polanco MD;  Location:  HEART CARDIAC CATH LAB     CV CORONARY ANGIOGRAM N/A 7/12/2021    Procedure: Coronary Angiogram;  Surgeon: Fermin Polanco MD;  Location:  HEART CARDIAC CATH LAB     CV HEART CATHETERIZATION WITH POSSIBLE INTERVENTION N/A 2/26/2019    Procedure: CORS;  Surgeon: Jagdish Hoyt MD;  Location:  HEART CARDIAC CATH LAB     CV INTRA AORTIC BALLOON N/A 7/12/2021    Procedure: Intra Aortic Balloon Pump Insertion;  Surgeon: Fermin Polanco MD;  Location: [facility] CARDIAC CATH LAB     ESOPHAGOSCOPY, GASTROSCOPY, DUODENOSCOPY  (EGD), COMBINED N/A 11/17/2016    Procedure: COMBINED ESOPHAGOSCOPY, GASTROSCOPY, DUODENOSCOPY (EGD);  Surgeon: Santi Rosas MD;  Location:  GI     ESOPHAGOSCOPY, GASTROSCOPY, DUODENOSCOPY (EGD), COMBINED N/A 11/17/2017    Procedure: COMBINED ESOPHAGOSCOPY, GASTROSCOPY, DUODENOSCOPY (EGD);  EGD;  Surgeon: Santi Rosas MD;  Location:  GI     ESOPHAGOSCOPY, GASTROSCOPY, DUODENOSCOPY (EGD), COMBINED N/A 12/28/2018    Procedure: EGD;  Surgeon: Santi Rosas MD;  Location:  OR     ESOPHAGOSCOPY, GASTROSCOPY, DUODENOSCOPY (EGD), COMBINED N/A 12/6/2019    Procedure: ESOPHAGOGASTRODUODENOSCOPY, WITH BIOPSY;  Surgeon: Adam Morton MD;  Location:  GI     ESOPHAGOSCOPY, GASTROSCOPY, DUODENOSCOPY (EGD), COMBINED N/A 2/13/2020    Procedure: ESOPHAGOGASTRODUODENOSCOPY (EGD);  Surgeon: Santi Rosas MD;  Location:  GI     HEAD & NECK SURGERY      12/2017 at [facility].      IMPLANT GOLD WEIGHT EYELID Right 11/16/2017    Procedure: IMPLANT WEIGHT EYELID;  Right Upper Eyelid Weight, right tarsal strip lower eyelid;  Surgeon: Milana Malave MD;  Location:  OR     IR CHEMO EMBOLIZATION  1/22/2019     KNEE SURGERY Left      ORTHOPEDIC SURGERY       PAROTIDECTOMY, RADICAL NECK DISSECTION Right 11/2/2017    Procedure: PAROTIDECTOMY, RADICAL NECK DISSECTION;  Right Superfacial Parotidectomy , Facial nerve repair. with [facility] facial nerve monitor.;  Surgeon: Asiya Morgan MD;  Location: [facility] OR     PHACOEMULSIFICATION CLEAR CORNEA WITH STANDARD INTRAOCULAR LENS IMPLANT Right 1/24/2023    Procedure: PHACOEMULSIFICATION, COMPLEX CATARACT, WITH INTRAOCULAR LENS IMPLANT WITH TRYPAN RIGHT EYE;  Surgeon: Enriqueta Martin MD;  Location: [facility] OR     PHACOEMULSIFICATION WITH STANDARD INTRAOCULAR LENS IMPLANT Left 1/3/2023    Procedure: PHACOEMULSIFICATION, COMPLEX CATARACT, WITH STANDARD INTRAOCULAR LENS IMPLANT INSERTION LEFT EYE;  Surgeon: Enriqueta Martin MD;  Location:  [facility] OR     PICC INSERTION Left 2017    4fr SL BioFlo PICC, 44cm in the L basilic vein w/ tip in the low SVC     RETURN LIVER TRANSPLANT N/A 2019    Procedure: Exploratory laparotomy, hematoma evacuation, abdominal washout;  Surgeon: Александр Ramos MD;  Location: [facility] OR     TRANSPLANT LIVER RECIPIENT  DONOR N/A 2019    Procedure: TRANSPLANT, LIVER, RECIPIENT,  DONOR;  Surgeon: Александр Ramos MD;  Location: [facility] OR     VASCULAR SURGERY         Family History   Problem Relation Age of Onset     Skin Cancer Mother      Cancer Mother      Diabetes Mother          3/2016     Cerebrovascular Disease Mother         Passed away in Feb of this year, 80 years old.     Thyroid Disease Mother      Depression Mother      Colon Cancer Father 60     Pancreatic Cancer Father 60     Prostate Cancer Father      Colorectal Cancer Father      Macular Degeneration Father      Cancer Father      Glaucoma Father      Skin Cancer Father      Asthma Sister         Had since birth     Thyroid Disease Sister      Depression Sister      Colorectal Cancer Maternal Grandmother      Cancer Maternal Grandmother      Substance Abuse Maternal Grandmother         Alcohol     Prostate Cancer Maternal Grandfather      Substance Abuse Maternal Grandfather         Alcohol     Colorectal Cancer Paternal Grandmother      Liver Disease No family hx of      Melanoma No family hx of      Anesthesia Reaction No family hx of      Deep Vein Thrombosis (DVT) No family hx of        Social History     Socioeconomic History     Marital status:      Highest education level: Bachelor's degree (e.g., BA, AB, BS)   Tobacco Use     Smoking status: Former     Packs/day: 6.00     Years: 30.00     Pack years: 180.00     Types: Cigars, Cigarettes     Start date: 2016     Quit date: 10/25/2017     Years since quittin.0     Smokeless tobacco: Former     Types: Chew     Quit date: 10/31/2017     Tobacco comments:     1  tin per week   Substance and Sexual Activity     Alcohol use: No     Alcohol/week: 0.0 standard drinks     Comment: quit Sept. 1996     Drug use: No     Sexual activity: Not Currently     Partners: Female     Birth control/protection: Condom   Other Topics Concern     Parent/sibling w/ CABG, MI or angioplasty before 65F 55M? Yes   Social History Narrative    Prior , [facility]     Social Determinants of Health     Intimate Partner Violence: Not At Risk     Fear of Current or Ex-Partner: No     Emotionally Abused: No     Physically Abused: No     Sexually Abused: No       Current Outpatient Medications   Medication     Acetaminophen (TYLENOL) 325 MG CAPS     ammonium lactate (AMLACTIN) 12 % external cream     aspirin (SM ASPIRIN ADULT LOW STRENGTH) 81 MG EC tablet     BD VIKTORIA U/F 32G X 4 MM insulin pen needle     benzoyl peroxide 5 % external liquid     Continuous Blood Gluc  (OleryYLE CLAUDIA 14 DAY READER) CECILIO     Continuous Blood Gluc Sensor (FREESTYLE CLAUDIA 2 SENSOR) MISC     dorzolamide-timolol (COSOPT) 2-0.5 % ophthalmic solution     empagliflozin (JARDIANCE) 10 MG TABS tablet     insulin aspart (NOVOLOG PEN) 100 UNIT/ML pen     insulin degludec (TRESIBA FLEXTOUCH) 100 UNIT/ML pen     ketoconazole (NIZORAL) 2 % external shampoo     ketorolac (ACULAR) 0.5 % ophthalmic solution     ketorolac (ACULAR) 0.5 % ophthalmic solution     lamiVUDine (EPIVIR) 100 MG tablet     metoprolol succinate ER (TOPROL XL) 25 MG 24 hr tablet     Multiple Vitamin (TAB-A-GLADIS) TABS     mycophenolate (GENERIC EQUIVALENT) 250 MG capsule     ofloxacin (OCUFLOX) 0.3 % ophthalmic solution     ofloxacin (OCUFLOX) 0.3 % ophthalmic solution     order for DME     pantoprazole (PROTONIX) 40 MG EC tablet     prednisoLONE acetate (PRED FORTE) 1 % ophthalmic suspension     prednisoLONE acetate (PRED FORTE) 1 % ophthalmic suspension     predniSONE (DELTASONE) 5 MG tablet     rosuvastatin (CRESTOR) 5 MG tablet     tamsulosin (FLOMAX)  0.4 MG capsule     vitamin D3 (VITAMIN D3) 50 mcg (2000 units) tablet     Current Facility-Administered Medications   Medication     aflibercept (EYLEA) injection prefilled syringe 2 mg     aflibercept (EYLEA) injection prefilled syringe 2 mg          Allergies   Allergen Reactions     Codeine Other (See Comments)     Cannot take due to liver  Cannot tolerate oral narcotics     Seasonal Allergies      Sneezing, coughing, runny and itchy eyes       Physical Exam  There were no vitals taken for this visit.  GENERAL: healthy, alert and no distress  RESP: no audible wheeze, cough, or visible cyanosis.  No visible retractions or increased work of breathing.  Able to speak fully in complete sentences.  PSYCH: mentation appears normal, affect normal/bright, judgement and insight intact, normal speech and appearance well-groomed    RESULTS  Lab Results   Component Value Date    A1C 6.9 (H) 12/14/2022    A1C 6.0 (H) 01/10/2022    A1C 6.8 (H) 07/13/2021    A1C 6.0 (H) 12/30/2020    A1C 6.3 (H) 06/13/2020    A1C 6.6 (H) 08/08/2018    A1C 6.5 (H) 06/09/2017    A1C 7.8 (H) 10/25/2016    HEMOGLOBINA1 4.8 03/04/2020    HEMOGLOBINA1 5.1 12/02/2019    HEMOGLOBINA1 5.4 08/14/2019    HEMOGLOBINA1 6.1 (A) 02/11/2019    HEMOGLOBINA1 8.1 (A) 04/11/2018       TSH   Date Value Ref Range Status   04/25/2022 1.24 0.40 - 4.00 mU/L Final   10/04/2021 1.90 0.40 - 4.00 mU/L Final   01/28/2021 0.91 0.40 - 4.00 mU/L Final   01/24/2020 1.91 0.40 - 4.00 mU/L Final   08/08/2018 0.49 0.40 - 4.00 mU/L Final   02/08/2018 0.71 0.40 - 4.00 mU/L Final   06/09/2017 0.42 0.40 - 4.00 mU/L Final     T4 Free   Date Value Ref Range Status   08/08/2018 1.21 0.76 - 1.46 ng/dL Final       ALT   Date Value Ref Range Status   12/14/2022 30 10 - 50 U/L Final   07/13/2022 21 0 - 70 U/L Final   06/28/2021 20 0 - 70 U/L Final   06/14/2021 21 0 - 70 U/L Final   ]    Recent Labs   Lab Test 09/07/21  1025 09/25/20  0859   CHOL 176 146   HDL 34* 39*   LDL 95 61   TRIG 233*  228*       Lab Results   Component Value Date     09/07/2022     06/28/2021      Lab Results   Component Value Date    POTASSIUM 4.7 09/07/2022    POTASSIUM 4.7 07/20/2022    POTASSIUM 4.6 06/28/2021     Lab Results   Component Value Date    CHLORIDE 103 09/07/2022    CHLORIDE 105 07/20/2022    CHLORIDE 107 06/28/2021     Lab Results   Component Value Date    DEBORAH 10.1 09/07/2022    DEBORAH 9.1 06/28/2021     Lab Results   Component Value Date    CO2 27 09/07/2022    CO2 26 07/20/2022    CO2 25 06/28/2021     Lab Results   Component Value Date    BUN 32.9 09/07/2022    BUN 32 07/20/2022    BUN 33 06/28/2021     Lab Results   Component Value Date    CR 1.47 09/07/2022    CR 1.62 06/28/2021       GFR Estimate   Date Value Ref Range Status   12/14/2022 46 (L) >60 mL/min/1.73m2 Final     Comment:     Effective December 21, 2021 eGFRcr in adults is calculated using the 2021 CKD-EPI creatinine equation which includes age and gender (Allie et al., NEJM, DOI: 10.1056/LYKJpt4376297)   09/07/2022 55 (L) >60 mL/min/1.73m2 Final     Comment:     Effective December 21, 2021 eGFRcr in adults is calculated using the 2021 CKD-EPI creatinine equation which includes age and gender (Allie et al., NEJM, DOI: 10.1056/MBUZek8839472)   07/20/2022 42 (L) >60 mL/min/1.73m2 Final     Comment:     Effective December 21, 2021 eGFRcr in adults is calculated using the 2021 CKD-EPI creatinine equation which includes age and gender (Allie et al., NEJ, DOI: 10.1056/KKQUyr4861668)   06/28/2021 46 (L) >60 mL/min/[1.73_m2] Final     Comment:     Non  GFR Calc  Starting 12/18/2018, serum creatinine based estimated GFR (eGFR) will be   calculated using the Chronic Kidney Disease Epidemiology Collaboration   (CKD-EPI) equation.     06/14/2021 49 (L) >60 mL/min/[1.73_m2] Final     Comment:     Non  GFR Calc  Starting 12/18/2018, serum creatinine based estimated GFR (eGFR) will be   calculated using the Chronic  Kidney Disease Epidemiology Collaboration   (CKD-EPI) equation.     06/04/2021 46 (L) >60 mL/min/[1.73_m2] Final     Comment:     Non  GFR Calc  Starting 12/18/2018, serum creatinine based estimated GFR (eGFR) will be   calculated using the Chronic Kidney Disease Epidemiology Collaboration   (CKD-EPI) equation.       GFR, ESTIMATED POCT   Date Value Ref Range Status   12/14/2022 40 (L) >60 mL/min/1.73m2 Final   06/08/2022 46 (L) >60 mL/min/1.73m2 Final   11/26/2021 47 (L) >60 mL/min/1.73m2 Final     GFR Estimate If Black   Date Value Ref Range Status   06/28/2021 54 (L) >60 mL/min/[1.73_m2] Final     Comment:      GFR Calc  Starting 12/18/2018, serum creatinine based estimated GFR (eGFR) will be   calculated using the Chronic Kidney Disease Epidemiology Collaboration   (CKD-EPI) equation.     06/14/2021 57 (L) >60 mL/min/[1.73_m2] Final     Comment:      GFR Calc  Starting 12/18/2018, serum creatinine based estimated GFR (eGFR) will be   calculated using the Chronic Kidney Disease Epidemiology Collaboration   (CKD-EPI) equation.     06/04/2021 53 (L) >60 mL/min/[1.73_m2] Final     Comment:      GFR Calc  Starting 12/18/2018, serum creatinine based estimated GFR (eGFR) will be   calculated using the Chronic Kidney Disease Epidemiology Collaboration   (CKD-EPI) equation.         Lab Results   Component Value Date    MICROL 298.0 09/07/2022    MICROL 47 04/20/2022    MICROL 563 02/26/2021     No results found for: MICROALBUMIN  No results found for: CPEPT, GADAB, ISCAB    Vitamin B12   Date Value Ref Range Status   01/11/2022 2,179 (H) 193 - 986 pg/mL Final   06/13/2020 682 193 - 986 pg/mL Final   02/04/2019 3,006 (H) 193 - 986 pg/mL Final   ]    Most recent eye exam date: : Not Found     Assessment/Plan:     1.  Type 2 diabetes- Doing ok, but recent glucose has been running higher.  Will check a1c next month. Would likely benefit from higher dose SGLT_2  inhibitor in the future, but he did have JERONIMO with illness/dehydration in the past and he does not drink a lot of water normally. Decided to start low dose metformin today (GFR 46) and increase long acting insulin.  Warned him that we may need to reduce his short acting insulin if he starts going low.  We made the following plan today (instructions given to patient):     Increase Tresiba to 36 units.      Start metformin 500 mg daily.  After 1 week, increase dose to 1000 mg daily. Take this with food. Do not advance the dose if you are having diarrhea.     Drink lots of water.      STOP Jardiance and Metformin during times of illness or dehydration.     Please let me know if you are having low blood sugars less than 70 or over 250 mg/dL.      If you have concerns, please send me a Fleck message M-Th, call the clinic at [phone], or call [phone] after hours/weekends and ask to speak with the endocrinologist on call.        Women should try to eat at least 21 to 25 grams of fiber a day, while men should aim for 30 to 38 grams a day along with plenty of water.    2.  Risk factors-     Retinopathy:  Yes.  Had eye exam within last year.   Nephropathy:  BP historically well controlled.+ albuminuria. On SGLT-2 inhibitor and lisinopril.  Creatinine stable. GFR 46.    Neuropathy: Yes.   Feet: OK, no ulcers. Sees podiatry.   Lipids:  LDL at target.  Patient taking rosuvastatin 5 mg.  Consider higher dose in the future.  Will discuss with Cardiologist at upcoming visit. Will check lipids.     3.  F/U in 3 months with Tish Toscano or myself, sooner with concerns.     23 minutes spent on the date of the encounter doing chart review, review of test results, review and interpretation of glucose data, patient visit and documentation, counseling/coordination of care, and discussion of follow up plan for worsening hyper and hypoglycemia.  The patient understood and is satisfied with today's visit.          Frannie Vasquez,  CLAUDIA, MPAS   [facility]  Department of Medicine  Division of Endocrinology and Diabetes

## 2023-01-25 ENCOUNTER — OFFICE VISIT (OUTPATIENT)
Dept: OPHTHALMOLOGY | Facility: CLINIC | Age: 58
End: 2023-01-25
Attending: OPHTHALMOLOGY
Payer: MEDICARE

## 2023-01-25 DIAGNOSIS — H40.051 BORDERLINE GLAUCOMA OF RIGHT EYE WITH OCULAR HYPERTENSION: Primary | ICD-10-CM

## 2023-01-25 PROCEDURE — 99024 POSTOP FOLLOW-UP VISIT: CPT | Performed by: OPHTHALMOLOGY

## 2023-01-25 PROCEDURE — G0463 HOSPITAL OUTPT CLINIC VISIT: HCPCS

## 2023-01-25 RX ORDER — DORZOLAMIDE HYDROCHLORIDE AND TIMOLOL MALEATE 20; 5 MG/ML; MG/ML
1 SOLUTION/ DROPS OPHTHALMIC 2 TIMES DAILY
Qty: 10 ML | Refills: 1 | Status: SHIPPED | OUTPATIENT
Start: 2023-01-25 | End: 2023-03-20

## 2023-01-25 ASSESSMENT — VISUAL ACUITY
OD_PH_SC+: -2
OS_SC: 20/20
OD_SC: 20/40
METHOD: SNELLEN - LINEAR
OD_PH_SC: 20/30
OS_SC+: -2
OD_SC+: -2

## 2023-01-25 ASSESSMENT — CUP TO DISC RATIO
OS_RATIO: 0.3
OD_RATIO: 0.2

## 2023-01-25 ASSESSMENT — SLIT LAMP EXAM - LIDS
COMMENTS: SMALL PAPILLOMA ALONG LL MARGIN, NON CONCERNING
COMMENTS: NORMAL

## 2023-01-25 ASSESSMENT — TONOMETRY
OD_IOP_MMHG: 44
OS_IOP_MMHG: 20
OD_IOP_MMHG: 39
IOP_METHOD: ICARE
IOP_METHOD: TONOPEN

## 2023-01-25 ASSESSMENT — EXTERNAL EXAM - RIGHT EYE: OD_EXAM: NORMAL

## 2023-01-25 ASSESSMENT — EXTERNAL EXAM - LEFT EYE: OS_EXAM: PERIOCULAR ECCHYMOSIS

## 2023-01-25 NOTE — PROGRESS NOTES
CC:  Follow up Cataract extraction intraocular lens     HPI: Frandy Workman is a 57 year old patient status post Cataract extraction intraocular lens left eye   history of Diabetes mellitus for 24 ys . Patient on insulin.    Interval History: s/p CEIOL left eye 1/3/22    Retinal Imaging:  OCT 12/21/22   RE: central Diabetic macular edema, slightly worse, VMA present, ok foveal contour,   LE: improved Diabetic macular edema, mild Epiretinal membrane, normal foveal contour;     fluorescein angiography 09/28/22  limited by oral dye. No obvious NVE, but peripheral leakage and capillary non perfusion; neovascularization elsewhere left eye probably not seen because of  Vitreous hemorrhage and oral fluorescein    Optos consistent with clinical exam    Assessment & Plan:    # POD1 status post Cataract extraction intraocular lens right eye   Intraocular pressure high  Eyedrops given in the clinic one drops of Dorzolamide, brimonidine and timolol  PLAN:   Ketorolac (gray top) four times a day    Predforte  (pink top) four times a day  (shake the bottle before)  Ofloxacin (tan top) four times a day    cosopt twice a day right eye     Put the eyedrops 5 minutes a part  Eye shield or glasses at all times x 3 weeks  Sleep with the shield  No heavy lifting   Follow-up in one week  Retina detachment and endophthalmitis precautions were discussed with the patient (increased blurry vision, drainage, new flashes, floaters or a curtain in the visual field) and was asked to return if any of the those occur      # Pseudophakia, left eye  - s/p CEIOL left eye 1/3/23 by Dr. Martin  - intraocular lens in good position  - vision excellent today (20/20)  - IOP 13    #T2DM   - HbA1c 6.0% (1/2022)  - Blood pressure (<120/80) and blood glucose (HbA1c <7.0, ~6.5 today) control discussed with patient. Patient advised that failure to adequately control each may lead to vision loss. The patient expressed understanding.    # Diabetic macular  edema both eyes   - status post avastin x 4. Switch to Eylea 4/24/19 with improvement of Diabetic macular edema   - mild DME each eye (right eye slightly worse, left eye improving with injections)   - watching closely right eye   - left eye plan for eylea 4 weeks, 2/1/23    # Proliferative diabetic retinopathy both eyes   # pre-proliferative diabetic retinopathy right eye    # new vitreous hemorrhage left eye 10/12/22   Status post  Eylea inj left eye  Status post  Panretinal laser photocoagulation (PRP) 9.28.22 left eye and Status post scatter PRP right eye 11/02/22     # Dry eye syndrome   Left eye worse than right eye   warm compresses and artificial tears  As needed  - punctal plugs previously left eye - reports this is better     # Status post liver transplant  - tx 11/2019  - severe anemia; s/p transfusion - anemia much improved   - being seen by his primary team    PLAN  patient to return in Feb 1st for post op and injection of Eylea left eye   K suture removal     ~~~~~~~~~~~~~~~~~~~~~~~~~~~~~~~~~~   Complete documentation of historical and exam elements from today's encounter can be found in the full encounter summary report (not reduplicated in this progress note).  I personally obtained the chief complaint(s) and history of present illness.  I confirmed and edited as necessary the review of systems, past medical/surgical history, family history, social history, and examination findings as documented by others; and I examined the patient myself.  I personally reviewed the relevant tests, images, and reports as documented above.  I formulated and edited as necessary the assessment and plan and discussed the findings and management plan with the patient and family    Enriqueta Martin MD   of Ophthalmology.  Retina Service   Department of Ophthalmology and Visual Neurosciences   [facility]  Phone: [phone]   Fax: [phone]

## 2023-01-25 NOTE — NURSING NOTE
Chief Complaints and History of Present Illnesses   Patient presents with     Post Op (Ophthalmology) Right Eye     PHACOEMULSIFICATION, COMPLEX CATARACT, WITH INTRAOCULAR LENS IMPLANT WITH TRYPAN RIGHT EYE  1/24/23     Chief Complaint(s) and History of Present Illness(es)     Post Op (Ophthalmology) Right Eye            Comments: PHACOEMULSIFICATION, COMPLEX CATARACT, WITH INTRAOCULAR LENS IMPLANT WITH TRYPAN RIGHT EYE  1/24/23          Comments    Day 1 PO  Pt states eye pain right eye 4/10 on the pain scale    Laurel Cristina COT 9:31 AM January 25, 2023

## 2023-01-30 ASSESSMENT — ENCOUNTER SYMPTOMS: SEIZURES: 0

## 2023-01-30 ASSESSMENT — LIFESTYLE VARIABLES: TOBACCO_USE: 0

## 2023-01-30 NOTE — ANESTHESIA PREPROCEDURE EVALUATION
Anesthesia Pre-Procedure Evaluation    Patient: Frandy Workman   MRN: [id number] : 1965        Procedure : Procedure(s):  PHACOEMULSIFICATION, COMPLEX CATARACT, WITH INTRAOCULAR LENS IMPLANT WITH TRYPAN RIGHT EYE          Past Medical History:   Diagnosis Date     Anemia      Arthritis      BPH (benign prostatic hyperplasia)      CAD (coronary artery disease) 2019     Cholelithiasis      Conductive hearing loss 2017     Depressive disorder 1986    Suffer effects throughout life     Gastroesophageal reflux disease 2014     HCC (hepatocellular carcinoma) (H) 2019     History of diabetic retinopathy 2018     HTN (hypertension)      HTN (hypertension) 2019     Hyperlipidemia      Liver cirrhosis secondary to ESTRADA (H)      Liver transplanted (H) 2019     Portal vein thrombosis 2019     Type II diabetes mellitus (H)       Past Surgical History:   Procedure Laterality Date     BYPASS GRAFT ARTERY CORONARY N/A 2021    Procedure: median sternotomy, on cardiopulmonary bypass, CORONARY ARTERY BYPASS GRAFT (CABG) x2 with left greater saphenous vein endoscopic harvest and left internal mammery artery harvest;  Surgeon: Tom Zapata MD;  Location: [facility]     COLONOSCOPY           COLONOSCOPY N/A 2019    Procedure: COLONOSCOPY, WITH POLYPECTOMY AND BIOPSY;  Surgeon: Adam Morton MD;  [facility]     CV CENTRAL VENOUS CATHETER PLACEMENT N/A 2021    Procedure: Central Venous Catheter Placement;  Surgeon: Fermin Polanco MD;  Location:  [facility]     CV CORONARY ANGIOGRAM N/A 2021    Procedure: Coronary Angiogram;  Surgeon: Fermin Polanco MD;  Location:  [facility]     CV HEART CATHETERIZATION WITH POSSIBLE INTERVENTION N/A 2019    Procedure: CORS;  Surgeon: Jagdish Hoyt MD;  Location:  [facility]     CV INTRA AORTIC BALLOON N/A 2021     Procedure: Intra Aortic Balloon Pump Insertion;  Surgeon: Fermin Polanco MD;  Location:  [facility]     ESOPHAGOSCOPY, GASTROSCOPY, DUODENOSCOPY (EGD), COMBINED N/A 11/17/2016    Procedure: COMBINED ESOPHAGOSCOPY, GASTROSCOPY, DUODENOSCOPY (EGD);  Surgeon: Santi Rosas MD;  Location:  GI     ESOPHAGOSCOPY, GASTROSCOPY, DUODENOSCOPY (EGD), COMBINED N/A 11/17/2017    Procedure: COMBINED ESOPHAGOSCOPY, GASTROSCOPY, DUODENOSCOPY (EGD);  EGD;  Surgeon: Santi Rosas MD;  Location:  GI     ESOPHAGOSCOPY, GASTROSCOPY, DUODENOSCOPY (EGD), COMBINED N/A 12/28/2018    Procedure: EGD;  Surgeon: Santi Rosas MD;  Location:  OR     ESOPHAGOSCOPY, GASTROSCOPY, DUODENOSCOPY (EGD), COMBINED N/A 12/6/2019    Procedure: ESOPHAGOGASTRODUODENOSCOPY, WITH BIOPSY;  Surgeon: Adam Morton MD;  Location:  GI     ESOPHAGOSCOPY, GASTROSCOPY, DUODENOSCOPY (EGD), COMBINED N/A 2/13/2020    Procedure: ESOPHAGOGASTRODUODENOSCOPY (EGD);  Surgeon: Santi Rosas MD;  Location:  GI     HEAD & NECK SURGERY      12/2017 at [facility].      IMPLANT GOLD WEIGHT EYELID Right 11/16/2017    Procedure: IMPLANT WEIGHT EYELID;  Right Upper Eyelid Weight, right tarsal strip lower eyelid;  Surgeon: Milana Malave MD;  [facility]: [facility]     IR CHEMO EMBOLIZATION  1/22/2019     KNEE SURGERY Left      ORTHOPEDIC SURGERY       PAROTIDECTOMY, RADICAL NECK DISSECTION Right 11/2/2017    Procedure: PAROTIDECTOMY, RADICAL NECK DISSECTION;  Right Superfacial Parotidectomy , Facial nerve repair. with [facility] facial nerve monitor.;  Surgeon: Asiya Morgan MD;  Location: [facility]     PHACOEMULSIFICATION CLEAR CORNEA WITH STANDARD INTRAOCULAR LENS IMPLANT Right 1/24/2023    Procedure: PHACOEMULSIFICATION, COMPLEX CATARACT, WITH INTRAOCULAR LENS IMPLANT WITH TRYPAN RIGHT EYE;  Surgeon: Enriqueta Martin MD;  Location: [facility]     PHACOEMULSIFICATION WITH STANDARD INTRAOCULAR LENS IMPLANT  Left 1/3/2023    Procedure: PHACOEMULSIFICATION, COMPLEX CATARACT, WITH STANDARD INTRAOCULAR LENS IMPLANT INSERTION LEFT EYE;  Surgeon: Enriqueta Martin MD;  Location: [facility] OR     PICC INSERTION Left 2017    4fr SL BioFlo PICC, 44cm in the L basilic vein w/ tip in the low SVC     RETURN LIVER TRANSPLANT N/A 2019    Procedure: Exploratory laparotomy, hematoma evacuation, abdominal washout;  Surgeon: Александр Ramos MD;  Location: [facility]     TRANSPLANT LIVER RECIPIENT  DONOR N/A 2019    Procedure: TRANSPLANT, LIVER, RECIPIENT,  DONOR;  Surgeon: Александр Ramos MD;  Location: [facility] OR     VASCULAR SURGERY        Allergies   Allergen Reactions     Codeine Other (See Comments)     Cannot take due to liver  Cannot tolerate oral narcotics     Seasonal Allergies      Sneezing, coughing, runny and itchy eyes      Social History     Tobacco Use     Smoking status: Former     Packs/day: 6.00     Years: 30.00     Pack years: 180.00     Types: Cigars, Cigarettes     Start date: 2016     Quit date: 10/25/2017     Years since quittin.2     Smokeless tobacco: Former     Types: Chew     Quit date: 10/31/2017     Tobacco comments:     1 tin per week   Substance Use Topics     Alcohol use: No     Alcohol/week: 0.0 standard drinks     Comment: quit 1996      Wt Readings from Last 1 Encounters:   23 88.5 kg (195 lb)        Anesthesia Evaluation   Pt has had prior anesthetic.     No history of anesthetic complications       ROS/MED HX  ENT/Pulmonary:  - neg pulmonary ROS  (-) tobacco use   Neurologic:  - neg neurologic ROS  (-) no seizures and no CVA   Cardiovascular:     (+) Dyslipidemia hypertension--CAD -CABG-date: 2021. -Taking blood thinners Previous cardiac testing   Echo: Date: 3/15/22 Results:  Interpretation Summary  Left ventricular function is normal.The ejection fraction is > 65%. Abnormal  non-specific septal motion is present.  Global right ventricular function  is normal. The RV is not clearly visualized  but the size and function are probably normal. The RV FAC is 40%.  There are no significant valvular abnormalities.  The estimated PA systolic pressure is 29 mmHg.  IVC diameter <2.1 cm collapsing >50% with sniff suggests a normal RA pressure  of 3 mmHg.  This study was compared with the study from 01/11/2022. There is no  significant change in the biventricular size/function upon direct visual  comparison.  Stress Test: Date: Results:    ECG Reviewed: Date: 1/2022 Results:  Sinus tachycardia  Cath: Date: Results:      METS/Exercise Tolerance: 4 - Raking leaves, gardening Comment: Can walk a couple blocks and ascend a flight of stairs without ROONEY/ CP   Hematologic:    (-) history of blood clots and history of blood transfusion   Musculoskeletal:   (+) arthritis,     GI/Hepatic: Comment: HCC s/p liver transplant    (+) GERD, Asymptomatic on medication, liver disease,     Renal/Genitourinary:     (+) renal disease, type: CRI,     Endo:     (+) type II DM, Last HgA1c: 6.9, date: 12/14/22, Using insulin, Chronic steroid usage for Post Transplant Immunosuppression.     Psychiatric/Substance Use:     (+) psychiatric history depression     Infectious Disease:  - neg infectious disease ROS     Malignancy:       Other:            Physical Exam    Airway        Mallampati: II       Respiratory Devices and Support         Dental           Cardiovascular          Rhythm and rate: regular     Pulmonary           breath sounds clear to auscultation           OUTSIDE LABS:  CBC:   Lab Results   Component Value Date    WBC 6.5 12/14/2022    WBC 4.7 09/07/2022    HGB 13.5 12/14/2022    HGB 12.8 (L) 09/07/2022    HCT 41.6 12/14/2022    HCT 40.3 09/07/2022     12/14/2022     (L) 09/07/2022     BMP:   Lab Results   Component Value Date     12/14/2022     09/07/2022    POTASSIUM 4.0 12/14/2022    POTASSIUM 4.7 09/07/2022    CHLORIDE 104 12/14/2022    CHLORIDE 103  09/07/2022    CO2 24 12/14/2022    CO2 27 09/07/2022    BUN 32.5 (H) 12/14/2022    BUN 32.9 (H) 09/07/2022    CR 1.9 (H) 12/14/2022    CR 1.69 (H) 12/14/2022     (H) 01/24/2023     (H) 01/03/2023     COAGS:   Lab Results   Component Value Date    PTT 46 (H) 07/14/2021    INR 1.13 01/11/2022    FIBR 372 07/14/2021     POC:   Lab Results   Component Value Date     (H) 06/26/2020     HEPATIC:   Lab Results   Component Value Date    ALBUMIN 4.7 12/14/2022    PROTTOTAL 7.5 12/14/2022    ALT 30 12/14/2022    AST 22 12/14/2022    ALKPHOS 116 12/14/2022    BILITOTAL 0.6 12/14/2022    JAMARI 31 11/15/2019     OTHER:   Lab Results   Component Value Date    PH 7.37 01/10/2022    LACT 0.5 (L) 01/11/2022    A1C 6.9 (H) 12/14/2022    DEBORAH 10.2 (H) 12/14/2022    PHOS 3.4 09/07/2022    MAG 2.3 01/10/2022    LIPASE 115 01/11/2022    AMYLASE 26 (L) 11/12/2019    TSH 1.24 04/25/2022    T4 1.21 08/08/2018    CRP 44.0 (H) 01/11/2022       Anesthesia Plan    ASA Status:  3   NPO Status:  NPO Appropriate    Anesthesia Type: MAC.              Consents    Anesthesia Plan(s) and associated risks, benefits, and realistic alternatives discussed. Questions answered and patient/representative(s) expressed understanding.    - Discussed:     - Discussed with:  Patient         Postoperative Care    Pain management: Multi-modal analgesia.   PONV prophylaxis: Ondansetron (or other 5HT-3)     Comments:                Brian Vigil MD

## 2023-01-30 NOTE — ANESTHESIA POSTPROCEDURE EVALUATION
Patient: Frandy Workman    Procedure: Procedure(s):  PHACOEMULSIFICATION, COMPLEX CATARACT, WITH INTRAOCULAR LENS IMPLANT WITH TRYPAN RIGHT EYE       Anesthesia Type:  MAC    Note:  Disposition: Outpatient   Postop Pain Control: Uneventful            Sign Out: Well controlled pain   PONV: No   Neuro/Psych: Uneventful            Sign Out: Acceptable/Baseline neuro status   Airway/Respiratory: Uneventful            Sign Out: Acceptable/Baseline resp. status   CV/Hemodynamics: Uneventful            Sign Out: Acceptable CV status; No obvious hypovolemia; No obvious fluid overload   Other NRE: NONE   DID A NON-ROUTINE EVENT OCCUR? No           Last vitals:  Vitals Value Taken Time   /66 01/24/23 0934   Temp 36.3  C (97.3  F) 01/24/23 0934   Pulse 87 01/24/23 0934   Resp 16 01/24/23 0934   SpO2 100 % 01/24/23 0934       Electronically Signed By: Brian Vigil MD  January 30, 2023  1:19 PM

## 2023-01-31 ENCOUNTER — VIRTUAL VISIT (OUTPATIENT)
Dept: ENDOCRINOLOGY | Facility: CLINIC | Age: 58
End: 2023-01-31
Payer: MEDICARE

## 2023-01-31 DIAGNOSIS — E11.3293 TYPE 2 DIABETES MELLITUS WITH MILD NONPROLIFERATIVE RETINOPATHY OF BOTH EYES WITHOUT MACULAR EDEMA, UNSPECIFIED WHETHER LONG TERM INSULIN USE (H): ICD-10-CM

## 2023-01-31 PROCEDURE — 99213 OFFICE O/P EST LOW 20 MIN: CPT | Mod: 95 | Performed by: PHYSICIAN ASSISTANT

## 2023-01-31 RX ORDER — INSULIN DEGLUDEC 100 U/ML
INJECTION, SOLUTION SUBCUTANEOUS
Qty: 45 ML | Refills: 3 | Status: SHIPPED | OUTPATIENT
Start: 2023-01-31 | End: 2023-08-02

## 2023-01-31 RX ORDER — METFORMIN HCL 500 MG
1000 TABLET, EXTENDED RELEASE 24 HR ORAL DAILY
Qty: 180 TABLET | Refills: 3 | Status: SHIPPED | OUTPATIENT
Start: 2023-01-31 | End: 2023-08-10

## 2023-01-31 NOTE — PATIENT INSTRUCTIONS
Nice seeing you, Miller!    Here is what we discussed today:     Increase Tresiba to 36 units.      Start metformin 500 mg daily.  After 1 week, increase dose to 1000 mg daily. Take this with food. Do not advance the dose if you are having diarrhea.     Drink lots of water.      STOP Jardiance and Metformin during times of illness or dehydration.     Please let me know if you are having low blood sugars less than 70 or over 250 mg/dL.      If you have concerns, please send me a [employer] message M-[id number], call the clinic at [phone], or call [phone] after hours/weekends and ask to speak with the endocrinologist on call.        Women should try to eat at least 21 to 25 grams of fiber a day, while men should aim for 30 to 38 grams a day along with plenty of water.    Here's a look at how much dietary fiber is found in some common foods. When buying packaged foods, check the Nutrition Facts label for fiber content. It can vary among brands.    Fruits     Serving size  Total fiber (grams)*  Avocado    1 cup   9.8   Raspberries    1 cup   8.0  Pear     1 medium  5.5  Apple, with skin   1 medium  4.5  Banana    1 medium  3.0  Orange    1 medium  3.0  Strawberries    1 cup   3.0    Vegetables    Serving size  Total fiber (grams)*  Green peas, boiled   1 cup   9.0  Broccoli, boiled   1 cup chopped 5.0  Turnip greens, boiled   1 cup   5.0  [place] sprouts, boiled  1 cup   4.0  Potato, with skin, baked  1 medium  4.0  Sweet corn, boiled   1 cup   3.5  Cauliflower, raw   1 cup chopped 2.0  Carrot, raw    1 medium  1.5    Grains     Serving size  Total fiber (grams)*  Spaghetti, whole-wheat, cooked 1 cup   6.0  Barley, pearled, cooked  1 cup   6.0  Bran flakes    3/4 cup  5.5  Quinoa, cooked   1 cup   5.0  Oat bran muffin   1 medium  5.0  Oatmeal, instant, cooked  1 cup   5.0  Popcorn, air-popped   3 cups   3.5  Brown rice, cooked   1 cup   3.5  Bread, whole-wheat   1 slice   2.0  Bread, rye    1 slice   2.0    Legumes, nuts  and seeds  Serving size  Total fiber (grams)*  Split peas, boiled   1 cup   16.0  Lentils, boiled    1 cup   15.5  Black beans, boiled   1 cup   15.0  Baked beans, canned   1 cup   10.0  Ovidio seeds    1 ounce  10.0  Almonds    1 ounce (23 nuts) 3.5  Pistachios    1 ounce (49 nuts) 3.0  Sunflower kernels   1 ounce  3.0  *Rounded to nearest 0.5 gram.    Source: USDA National Nutrient Database

## 2023-01-31 NOTE — LETTER
2023       RE: Frandy Workman  [address] [facility] [address]     Dear Colleague,    Thank you for referring your patient, Frandy Workman, to the [facility] ENDOCRINOLOGY CLINIC [facility]. Please see a copy of my visit note below.    Outcome for 23 11:29 AM: Data uploaded on Libre Taylor Myhre, VF    Patient is showing 5/5 MNCM met.   Oriana Dickinson LPN     Frandy Workman  is being evaluated via a billable video visit.      How would you like to obtain your AVS? MyChart  For the video visit, send the invitation by: Send to e-mail at: [email or web address]  Will anyone else be joining your video visit? No      Start time: 10:38am  End time: 10:52am  Provider location- off site- home.   Patient location- off site- home.       HPI:     Frandy Workman is a 57 year old man here for follow up of type 2 diabetes complicated by nephropathy, retinopathy and neuropathy. 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  He usually sees Tish Toscano.  Today is the first time I am meeting him.  He reports that he has been having high glucose readings recently and he is not sure why.   He has been feeling more tired and not that hungry.  Not feeling himself.  He feels like he has been sleeping too much during the day.  No testing for COVID.  He has had no cough or SOB.  Otherwise feeling well.      Snacking-     Still staying up in the 200-220 range.  Increased to 5-10 units 4 times a day.  Not exercising because he can't     DM Regimen:   - Tresiba 32 units /day.   - Carbohydrate coverage to 1 unit:10 g of carbohydrate (5-10 units at a time).   - Correction Novolounit Novolo mg /dl >180     - Empagliflozin 10 mg daily (had JERONIMO/hypovolemias on higher  dose).    Previous treatments:  Metformin- unsure why he went off. Kidneys?    Feeling tired, lethargic.  Starting to have reflux problems.     Recent glucose:              Diabetes Control:   Lab Results   Component Value Date    A1C 6.0 01/10/2022    A1C 6.8 07/13/2021    A1C 6.0 12/30/2020    A1C 6.3 06/13/2020    A1C 6.6 08/08/2018    A1C 6.5 06/09/2017    A1C 7.8 10/25/2016       Past Medical History:   Diagnosis Date     Anemia 2013     Arthritis      BPH (benign prostatic hyperplasia)      CAD (coronary artery disease) 4/1/2019     Cholelithiasis      Conductive hearing loss 08/16/2017     Depressive disorder 1986    Suffer effects throughout life     Gastroesophageal reflux disease 12/01/2014     HCC (hepatocellular carcinoma) (H) 1/22/2019     History of diabetic retinopathy 07/2018     HTN (hypertension)      HTN (hypertension) 11/20/2019     Hyperlipidemia      Liver cirrhosis secondary to ESTRADA (H)      Liver transplanted (H) 11/11/2019     Portal vein thrombosis 4/11/2019     Type II diabetes mellitus (H)        Past Surgical History:   Procedure Laterality Date     BYPASS GRAFT ARTERY CORONARY N/A 7/14/2021    Procedure: median sternotomy, on cardiopulmonary bypass, CORONARY ARTERY BYPASS GRAFT (CABG) x2 with left greater saphenous vein endoscopic harvest and left internal mammery artery harvest;  Surgeon: Tom Zapata MD;  Location: [facility] OR     COLONOSCOPY      2015     COLONOSCOPY N/A 12/6/2019    Procedure: COLONOSCOPY, WITH POLYPECTOMY AND BIOPSY;  Surgeon: Adam Morton MD;  Location:  GI     CV CENTRAL VENOUS CATHETER PLACEMENT N/A 7/12/2021    Procedure: Central Venous Catheter Placement;  Surgeon: Fermin Polanco MD;  Location:  HEART CARDIAC CATH LAB     CV CORONARY ANGIOGRAM N/A 7/12/2021    Procedure: Coronary Angiogram;  Surgeon: Fermin Polanco MD;  Location:  HEART CARDIAC CATH LAB     CV HEART CATHETERIZATION WITH POSSIBLE INTERVENTION N/A  2/26/2019    Procedure: CORS;  Surgeon: Jagdish Hoyt MD;  Location:  HEART CARDIAC CATH LAB     CV INTRA AORTIC BALLOON N/A 7/12/2021    Procedure: Intra Aortic Balloon Pump Insertion;  Surgeon: Fermin Polanco MD;  Location:  HEART CARDIAC CATH LAB     ESOPHAGOSCOPY, GASTROSCOPY, DUODENOSCOPY (EGD), COMBINED N/A 11/17/2016    Procedure: COMBINED ESOPHAGOSCOPY, GASTROSCOPY, DUODENOSCOPY (EGD);  Surgeon: Santi Rosas MD;  Location:  GI     ESOPHAGOSCOPY, GASTROSCOPY, DUODENOSCOPY (EGD), COMBINED N/A 11/17/2017    Procedure: COMBINED ESOPHAGOSCOPY, GASTROSCOPY, DUODENOSCOPY (EGD);  EGD;  Surgeon: Santi Rosas MD;  Location:  GI     ESOPHAGOSCOPY, GASTROSCOPY, DUODENOSCOPY (EGD), COMBINED N/A 12/28/2018    Procedure: EGD;  Surgeon: Santi Rosas MD;  [facility]: [facility]     ESOPHAGOSCOPY, GASTROSCOPY, DUODENOSCOPY (EGD), COMBINED N/A 12/6/2019    Procedure: ESOPHAGOGASTRODUODENOSCOPY, WITH BIOPSY;  Surgeon: Adam Morton MD;  Location:  GI     ESOPHAGOSCOPY, GASTROSCOPY, DUODENOSCOPY (EGD), COMBINED N/A 2/13/2020    Procedure: ESOPHAGOGASTRODUODENOSCOPY (EGD);  Surgeon: Santi Rosas MD;  Location:  GI     HEAD & NECK SURGERY      12/2017 at [facility].      IMPLANT GOLD WEIGHT EYELID Right 11/16/2017    Procedure: IMPLANT WEIGHT EYELID;  Right Upper Eyelid Weight, right tarsal strip lower eyelid;  Surgeon: Milana Malave MD;  [facility]: [facility]     IR CHEMO EMBOLIZATION  1/22/2019     KNEE SURGERY Left      ORTHOPEDIC SURGERY       PAROTIDECTOMY, RADICAL NECK DISSECTION Right 11/2/2017    Procedure: PAROTIDECTOMY, RADICAL NECK DISSECTION;  Right Superfacial Parotidectomy , Facial nerve repair. with [facility] facial nerve monitor.;  Surgeon: Asiya Morgan MD;  Location: [facility] OR     PHACOEMULSIFICATION CLEAR CORNEA WITH STANDARD INTRAOCULAR LENS IMPLANT Right 1/24/2023    Procedure: PHACOEMULSIFICATION, COMPLEX CATARACT, WITH INTRAOCULAR LENS  IMPLANT WITH TRYPAN RIGHT EYE;  Surgeon: Enriqueta Martin MD;  Location: [facility] OR     PHACOEMULSIFICATION WITH STANDARD INTRAOCULAR LENS IMPLANT Left 1/3/2023    Procedure: PHACOEMULSIFICATION, COMPLEX CATARACT, WITH STANDARD INTRAOCULAR LENS IMPLANT INSERTION LEFT EYE;  Surgeon: Enriqueta Martin MD;  Location: [facility] OR     PICC INSERTION Left 2017    4fr SL BioFlo PICC, 44cm in the L basilic vein w/ tip in the low SVC     RETURN LIVER TRANSPLANT N/A 2019    Procedure: Exploratory laparotomy, hematoma evacuation, abdominal washout;  Surgeon: Александр Ramos MD;  Location: [facility] OR     TRANSPLANT LIVER RECIPIENT  DONOR N/A 2019    Procedure: TRANSPLANT, LIVER, RECIPIENT,  DONOR;  Surgeon: Александр Ramos MD;  Location: [facility] OR     VASCULAR SURGERY         Family History   Problem Relation Age of Onset     Skin Cancer Mother      Cancer Mother      Diabetes Mother          3/2016     Cerebrovascular Disease Mother         Passed away in Feb of this year, 80 years old.     Thyroid Disease Mother      Depression Mother      Colon Cancer Father 60     Pancreatic Cancer Father 60     Prostate Cancer Father      Colorectal Cancer Father      Macular Degeneration Father      Cancer Father      Glaucoma Father      Skin Cancer Father      Asthma Sister         Had since birth     Thyroid Disease Sister      Depression Sister      Colorectal Cancer Maternal Grandmother      Cancer Maternal Grandmother      Substance Abuse Maternal Grandmother         Alcohol     Prostate Cancer Maternal Grandfather      Substance Abuse Maternal Grandfather         Alcohol     Colorectal Cancer Paternal Grandmother      Liver Disease No family hx of      Melanoma No family hx of      Anesthesia Reaction No family hx of      Deep Vein Thrombosis (DVT) No family hx of        Social History     Socioeconomic History     Marital status:      Highest education level: Bachelor's degree  (e.g., BA, AB, BS)   Tobacco Use     Smoking status: Former     Packs/day: 6.00     Years: 30.00     Pack years: 180.00     Types: Cigars, Cigarettes     Start date: 2016     Quit date: 10/25/2017     Years since quittin.0     Smokeless tobacco: Former     Types: Chew     Quit date: 10/31/2017     Tobacco comments:     1 tin per week   Substance and Sexual Activity     Alcohol use: No     Alcohol/week: 0.0 standard drinks     Comment: quit 1996     Drug use: No     Sexual activity: Not Currently     Partners: Female     Birth control/protection: Condom   Other Topics Concern     Parent/sibling w/ CABG, MI or angioplasty before 65F 55M? Yes   Social History Narrative    Prior , [place]     Social Determinants of Health     Intimate Partner Violence: Not At Risk     Fear of Current or Ex-Partner: No     Emotionally Abused: No     Physically Abused: No     Sexually Abused: No       Current Outpatient Medications   Medication     Acetaminophen (TYLENOL) 325 MG CAPS     ammonium lactate (AMLACTIN) 12 % external cream     aspirin (SM ASPIRIN ADULT LOW STRENGTH) 81 MG EC tablet     BD VIKTORIA U/F 32G X 4 MM insulin pen needle     benzoyl peroxide 5 % external liquid     Continuous Blood Gluc  (FREESTYLE CLAUDIA 14 DAY READER) CECILIO     Continuous Blood Gluc Sensor (FREESTYLE CLAUDIA 2 SENSOR) MISC     dorzolamide-timolol (COSOPT) 2-0.5 % ophthalmic solution     empagliflozin (JARDIANCE) 10 MG TABS tablet     insulin aspart (NOVOLOG PEN) 100 UNIT/ML pen     insulin degludec (TRESIBA FLEXTOUCH) 100 UNIT/ML pen     ketoconazole (NIZORAL) 2 % external shampoo     ketorolac (ACULAR) 0.5 % ophthalmic solution     ketorolac (ACULAR) 0.5 % ophthalmic solution     lamiVUDine (EPIVIR) 100 MG tablet     metoprolol succinate ER (TOPROL XL) 25 MG 24 hr tablet     Multiple Vitamin (TAB-A-GLADIS) TABS     mycophenolate (GENERIC EQUIVALENT) 250 MG capsule     ofloxacin (OCUFLOX) 0.3 % ophthalmic solution      ofloxacin (OCUFLOX) 0.3 % ophthalmic solution     order for DME     pantoprazole (PROTONIX) 40 MG EC tablet     prednisoLONE acetate (PRED FORTE) 1 % ophthalmic suspension     prednisoLONE acetate (PRED FORTE) 1 % ophthalmic suspension     predniSONE (DELTASONE) 5 MG tablet     rosuvastatin (CRESTOR) 5 MG tablet     tamsulosin (FLOMAX) 0.4 MG capsule     vitamin D3 (VITAMIN D3) 50 mcg (2000 units) tablet     Current Facility-Administered Medications   Medication     aflibercept (EYLEA) injection prefilled syringe 2 mg     aflibercept (EYLEA) injection prefilled syringe 2 mg          Allergies   Allergen Reactions     Codeine Other (See Comments)     Cannot take due to liver  Cannot tolerate oral narcotics     Seasonal Allergies      Sneezing, coughing, runny and itchy eyes       Physical Exam  There were no vitals taken for this visit.  GENERAL: healthy, alert and no distress  RESP: no audible wheeze, cough, or visible cyanosis.  No visible retractions or increased work of breathing.  Able to speak fully in complete sentences.  PSYCH: mentation appears normal, affect normal/bright, judgement and insight intact, normal speech and appearance well-groomed    RESULTS  Lab Results   Component Value Date    A1C 6.9 (H) 12/14/2022    A1C 6.0 (H) 01/10/2022    A1C 6.8 (H) 07/13/2021    A1C 6.0 (H) 12/30/2020    A1C 6.3 (H) 06/13/2020    A1C 6.6 (H) 08/08/2018    A1C 6.5 (H) 06/09/2017    A1C 7.8 (H) 10/25/2016    HEMOGLOBINA1 4.8 03/04/2020    HEMOGLOBINA1 5.1 12/02/2019    HEMOGLOBINA1 5.4 08/14/2019    HEMOGLOBINA1 6.1 (A) 02/11/2019    HEMOGLOBINA1 8.1 (A) 04/11/2018       TSH   Date Value Ref Range Status   04/25/2022 1.24 0.40 - 4.00 mU/L Final   10/04/2021 1.90 0.40 - 4.00 mU/L Final   01/28/2021 0.91 0.40 - 4.00 mU/L Final   01/24/2020 1.91 0.40 - 4.00 mU/L Final   08/08/2018 0.49 0.40 - 4.00 mU/L Final   02/08/2018 0.71 0.40 - 4.00 mU/L Final   06/09/2017 0.42 0.40 - 4.00 mU/L Final     T4 Free   Date Value Ref  Range Status   08/08/2018 1.21 0.76 - 1.46 ng/dL Final       ALT   Date Value Ref Range Status   12/14/2022 30 10 - 50 U/L Final   07/13/2022 21 0 - 70 U/L Final   06/28/2021 20 0 - 70 U/L Final   06/14/2021 21 0 - 70 U/L Final   ]    Recent Labs   Lab Test 09/07/21  1025 09/25/20  0859   CHOL 176 146   HDL 34* 39*   LDL 95 61   TRIG 233* 228*       Lab Results   Component Value Date     09/07/2022     06/28/2021      Lab Results   Component Value Date    POTASSIUM 4.7 09/07/2022    POTASSIUM 4.7 07/20/2022    POTASSIUM 4.6 06/28/2021     Lab Results   Component Value Date    CHLORIDE 103 09/07/2022    CHLORIDE 105 07/20/2022    CHLORIDE 107 06/28/2021     Lab Results   Component Value Date    DEBORAH 10.1 09/07/2022    DEBORAH 9.1 06/28/2021     Lab Results   Component Value Date    CO2 27 09/07/2022    CO2 26 07/20/2022    CO2 25 06/28/2021     Lab Results   Component Value Date    BUN 32.9 09/07/2022    BUN 32 07/20/2022    BUN 33 06/28/2021     Lab Results   Component Value Date    CR 1.47 09/07/2022    CR 1.62 06/28/2021       GFR Estimate   Date Value Ref Range Status   12/14/2022 46 (L) >60 mL/min/1.73m2 Final     Comment:     Effective December 21, 2021 eGFRcr in adults is calculated using the 2021 CKD-EPI creatinine equation which includes age and gender ( et al., NEJ, DOI: 10.1056/DPMHoi3013596)   09/07/2022 55 (L) >60 mL/min/1.73m2 Final     Comment:     Effective December 21, 2021 eGFRcr in adults is calculated using the 2021 CKD-EPI creatinine equation which includes age and gender ( et al., NEJ, DOI: 10.1056/SGLXkl1657166)   07/20/2022 42 (L) >60 mL/min/1.73m2 Final     Comment:     Effective December 21, 2021 eGFRcr in adults is calculated using the 2021 CKD-EPI creatinine equation which includes age and gender (Allie et al., NEJM, DOI: 10.1056/YKZCgt1817626)   06/28/2021 46 (L) >60 mL/min/[1.73_m2] Final     Comment:     Non  GFR Calc  Starting 12/18/2018, serum  creatinine based estimated GFR (eGFR) will be   calculated using the Chronic Kidney Disease Epidemiology Collaboration   (CKD-EPI) equation.     06/14/2021 49 (L) >60 mL/min/[1.73_m2] Final     Comment:     Non  GFR Calc  Starting 12/18/2018, serum creatinine based estimated GFR (eGFR) will be   calculated using the Chronic Kidney Disease Epidemiology Collaboration   (CKD-EPI) equation.     06/04/2021 46 (L) >60 mL/min/[1.73_m2] Final     Comment:     Non  GFR Calc  Starting 12/18/2018, serum creatinine based estimated GFR (eGFR) will be   calculated using the Chronic Kidney Disease Epidemiology Collaboration   (CKD-EPI) equation.       GFR, ESTIMATED POCT   Date Value Ref Range Status   12/14/2022 40 (L) >60 mL/min/1.73m2 Final   06/08/2022 46 (L) >60 mL/min/1.73m2 Final   11/26/2021 47 (L) >60 mL/min/1.73m2 Final     GFR Estimate If Black   Date Value Ref Range Status   06/28/2021 54 (L) >60 mL/min/[1.73_m2] Final     Comment:      GFR Calc  Starting 12/18/2018, serum creatinine based estimated GFR (eGFR) will be   calculated using the Chronic Kidney Disease Epidemiology Collaboration   (CKD-EPI) equation.     06/14/2021 57 (L) >60 mL/min/[1.73_m2] Final     Comment:      GFR Calc  Starting 12/18/2018, serum creatinine based estimated GFR (eGFR) will be   calculated using the Chronic Kidney Disease Epidemiology Collaboration   (CKD-EPI) equation.     06/04/2021 53 (L) >60 mL/min/[1.73_m2] Final     Comment:      GFR Calc  Starting 12/18/2018, serum creatinine based estimated GFR (eGFR) will be   calculated using the Chronic Kidney Disease Epidemiology Collaboration   (CKD-EPI) equation.         Lab Results   Component Value Date    MICROL 298.0 09/07/2022    MICROL 47 04/20/2022    MICROL 563 02/26/2021     No results found for: MICROALBUMIN  No results found for: CPEPT, GADAB, ISCAB    Vitamin B12   Date Value Ref Range Status    01/11/2022 2,179 (H) 193 - 986 pg/mL Final   06/13/2020 682 193 - 986 pg/mL Final   02/04/2019 3,006 (H) 193 - 986 pg/mL Final   ]    Most recent eye exam date: : Not Found     Assessment/Plan:     1.  Type 2 diabetes- Doing ok, but recent glucose has been running higher.  Will check a1c next month. Would likely benefit from higher dose SGLT_2 inhibitor in the future, but he did have JERONIMO with illness/dehydration in the past and he does not drink a lot of water normally. Decided to start low dose metformin today (GFR 46) and increase long acting insulin.  Warned him that we may need to reduce his short acting insulin if he starts going low.  We made the following plan today (instructions given to patient):     Increase Tresiba to 36 units.      Start metformin 500 mg daily.  After 1 week, increase dose to 1000 mg daily. Take this with food. Do not advance the dose if you are having diarrhea.     Drink lots of water.      STOP Jardiance and Metformin during times of illness or dehydration.     Please let me know if you are having low blood sugars less than 70 or over 250 mg/dL.      If you have concerns, please send me a [email or web address] message M-Th, call the clinic at [phone], or call [phone] after hours/weekends and ask to speak with the endocrinologist on call.        Women should try to eat at least 21 to 25 grams of fiber a day, while men should aim for 30 to 38 grams a day along with plenty of water.    2.  Risk factors-     Retinopathy:  Yes.  Had eye exam within last year.   Nephropathy:  BP historically well controlled.+ albuminuria. On SGLT-2 inhibitor and lisinopril.  Creatinine stable. GFR 46.    Neuropathy: Yes.   Feet: OK, no ulcers. Sees podiatry.   Lipids:  LDL at target.  Patient taking rosuvastatin 5 mg.  Consider higher dose in the future.  Will discuss with Cardiologist at upcoming visit. Will check lipids.     3.  F/U in 3 months with Tish Toscano or myself, sooner with concerns.     23  minutes spent on the date of the encounter doing chart review, review of test results, review and interpretation of glucose data, patient visit and documentation, counseling/coordination of care, and discussion of follow up plan for worsening hyper and hypoglycemia.  The patient understood and is satisfied with today's visit.          Frannie Vasquez PA-C, MPAS   [facility]  Department of Medicine  Division of Endocrinology and Diabetes

## 2023-01-31 NOTE — NURSING NOTE
Patient denies any changes since echeck-in regarding medication and allergies and states all information entered during echeck-in remains accurate. Declined individually reviewing with VF today.    Oriana Dickinson LPN 01/31/23 9:46 AM

## 2023-02-01 ENCOUNTER — TELEPHONE (OUTPATIENT)
Dept: ENDOCRINOLOGY | Facility: CLINIC | Age: 58
End: 2023-02-01

## 2023-02-01 ENCOUNTER — OFFICE VISIT (OUTPATIENT)
Dept: OPHTHALMOLOGY | Facility: CLINIC | Age: 58
End: 2023-02-01
Attending: OPHTHALMOLOGY
Payer: MEDICARE

## 2023-02-01 DIAGNOSIS — E11.3313 TYPE 2 DIABETES MELLITUS WITH MODERATE NONPROLIFERATIVE RETINOPATHY OF BOTH EYES AND MACULAR EDEMA, UNSPECIFIED WHETHER LONG TERM INSULIN USE (H): Primary | ICD-10-CM

## 2023-02-01 PROCEDURE — 92134 CPTRZ OPH DX IMG PST SGM RTA: CPT | Performed by: OPHTHALMOLOGY

## 2023-02-01 PROCEDURE — G0463 HOSPITAL OUTPT CLINIC VISIT: HCPCS | Mod: 25

## 2023-02-01 PROCEDURE — 99024 POSTOP FOLLOW-UP VISIT: CPT | Mod: GC | Performed by: OPHTHALMOLOGY

## 2023-02-01 ASSESSMENT — VISUAL ACUITY
METHOD: SNELLEN - LINEAR
OS_SC: 20/20
OD_SC: 20/20
OD_SC+: -1
OS_SC+: -2

## 2023-02-01 ASSESSMENT — EXTERNAL EXAM - RIGHT EYE: OD_EXAM: NORMAL

## 2023-02-01 ASSESSMENT — EXTERNAL EXAM - LEFT EYE: OS_EXAM: PERIOCULAR ECCHYMOSIS

## 2023-02-01 ASSESSMENT — TONOMETRY
OD_IOP_MMHG: 18
OS_IOP_MMHG: 19
IOP_METHOD: TONOPEN

## 2023-02-01 ASSESSMENT — SLIT LAMP EXAM - LIDS
COMMENTS: NORMAL
COMMENTS: SMALL PAPILLOMA ALONG LL MARGIN, NON CONCERNING

## 2023-02-01 ASSESSMENT — CUP TO DISC RATIO
OD_RATIO: 0.3
OS_RATIO: 0.4

## 2023-02-01 NOTE — TELEPHONE ENCOUNTER
Attempted to reach patient to schedule follow up in the Endocrinology Clinic. No answer, LM on VM to call office back.    Schedule with LILLIAN Pineda in about 3 months (around 4/30/2023).    Oriana Dickinson LPN

## 2023-02-01 NOTE — NURSING NOTE
Chief Complaints and History of Present Illnesses   Patient presents with     Post Op (Ophthalmology) Right Eye     1 week post op CE/IOL RE and injection LE  Slight itching RE, vision is getting better  Blood sugar 240 this am last A1c 6.9 taken in OCT.  Ketorolac qid RE  Prednisolone qid RE  Ofloxacin qid RE  Cosopt bid RE  Catherine Mohan [facility] 9:44 AM February 1, 2023        Chief Complaint(s) and History of Present Illness(es)     Post Op (Ophthalmology) Right Eye            Laterality: right eye    Treatments tried: eye drops    Pain scale: 0/10    Comments: 1 week post op CE/IOL RE and injection LE  Slight itching RE, vision is getting better  Blood sugar 240 this am last A1c 6.9 taken in OCT.  Ketorolac qid RE  Prednisolone qid RE  Ofloxacin qid RE  Cosopt bid RE  Catherine Mohan [facility] 9:44 AM February 1, 2023

## 2023-02-01 NOTE — PATIENT INSTRUCTIONS
Taper Predforte (pink top) and Ketorolac (gray top) as follows: three times a day for 1 week, two times a day for 1 week, daily for 1 week, then stop  Ofloxacin (tan top) three times a day x 5 days and stop  Continue cosopt twice a day right eye   - counseled pt on choosing over the counter reading glasses in the mean time    Put the eyedrops 5 minutes a part  Eye shield or glasses at all times x 3 weeks  Sleep with the shield  No heavy lifting   Follow-up in three weeks  Retina detachment and endophthalmitis precautions were discussed with the patient (increased blurry vision, drainage, new flashes, floaters or a curtain in the visual field) and was asked to return if any of the those occur

## 2023-02-01 NOTE — PROGRESS NOTES
CC:  Follow up Cataract extraction intraocular lens     Interaval: POM1 s/p CEIOL left eye, POW1 s/p CEIOL right eye says right eye feels itchy. He is done with the drops in his left eye and is using all the drops 4x/day in the right eye.     HPI: Frandy Workman is a 57 year old patient status post Cataract extraction intraocular lens left eye   history of Diabetes mellitus for 24 ys . Patient on insulin.    Interval History: s/p CEIOL left eye 1/3/22    Retinal Imaging:  OCT 2/1/23  RE: central Diabetic macular edema, slightly improved, VMA present, ok foveal contour, - better  LE: improved Diabetic macular edema, mild Epiretinal membrane, normal foveal contour; - stable    fluorescein angiography 09/28/22  limited by oral dye. No obvious NVE, but peripheral leakage and capillary non perfusion; neovascularization elsewhere left eye probably not seen because of  Vitreous hemorrhage and oral fluorescein    Optos consistent with clinical exam    Assessment & Plan:    # POW1 status post Cataract extraction intraocular lens right eye   Intraocular pressure 18/19  Eyedrops given in the clinic one drops of Dorzolamide, brimonidine and timolol    PLAN:   Taper Predforte (pink top) and Ketorolac (gray top) as follows: three times a day for 1 week, two times a day for 1 week, daily for 1 week, then stop  Ofloxacin (tan top) three times a day x 5 days and stop  Continue cosopt twice a day right eye   - will check MRx at 1 month post op  - counseled pt on choosing over the counter reading glasses in the mean time    Put the eyedrops 5 minutes a part  Eye shield or glasses at all times x 3 weeks  Sleep with the shield  No heavy lifting   Follow-up in three weeks  Retina detachment and endophthalmitis precautions were discussed with the patient (increased blurry vision, drainage, new flashes, floaters or a curtain in the visual field) and was asked to return if any of the those occur    Cornea suture removal:  1gtt  proparacaine given  1gtt of betadine 5% administered  Cornea suture removed without complications  1gtt of betadine 5% administered    # Pseudophakia, left eye  - s/p CEIOL left eye 1/3/23 by Dr. Martin  - intraocular lens in good position  - vision excellent today (20/20)  - IOP 19    #T2DM   - HbA1c 6.0% (1/2022)  - Blood pressure (<120/80) and blood glucose (HbA1c <7.0, ~6.5 today) control discussed with patient. Patient advised that failure to adequately control each may lead to vision loss. The patient expressed understanding.    # Diabetic macular edema both eyes   - status post avastin x 4. Switch to Eylea 4/24/19 with improvement of Diabetic macular edema   - mild DME each eye (right eye slightly worse, left eye improved with injections)   - watching closely right eye   - last Eylea left eye 12.21.22  Patient prefers T&E eylea left eye - plan for poss eylea in 3 wees    # Proliferative diabetic retinopathy both eyes   # pre-proliferative diabetic retinopathy right eye    # new vitreous hemorrhage left eye 10/12/22   Status post  Eylea inj left eye  Status post  Panretinal laser photocoagulation (PRP) 9.28.22 left eye and Status post scatter PRP right eye 11/02/22     # Dry eye syndrome   Left eye worse than right eye   warm compresses and artificial tears  As needed  - punctal plugs previously left eye - reports this is better     # Status post liver transplant  - tx 11/2019  - severe anemia; s/p transfusion - anemia much improved   - being seen by his primary team    PLAN  Plan for eylea left eye 02/01/23 will T&E   Follow-up in three weeks with prescription and Optical Coherence Tomography; possible eylea inj left eye. then   Follow up in 8 weeks with Optical Coherence Tomography, fluorescein angiography transits right eye and possible inj   K suture removal 02/01/23      Princess Berg MD  Ophthalmology Resident, PGY-3  [facility]      ~~~~~~~~~~~~~~~~~~~~~~~~~~~~~~~~~~   Complete  documentation of historical and exam elements from today's encounter can be found in the full encounter summary report (not reduplicated in this progress note).  I personally obtained the chief complaint(s) and history of present illness.  I confirmed and edited as necessary the review of systems, past medical/surgical history, family history, social history, and examination findings as documented by others; and I examined the patient myself.  I personally reviewed the relevant tests, images, and reports as documented above.  I personally reviewed the ophthalmic test(s) associated with this encounter, agree with the interpretation(s) as documented by the resident/fellow, and have edited the corresponding report(s) as necessary.   I formulated and edited as necessary the assessment and plan and discussed the findings and management plan with the patient and family    Enriqueta Martin MD   of Ophthalmology.  Retina Service   Department of Ophthalmology and Visual Neurosciences   [facility]  Phone: [phone]   Fax: [phone]

## 2023-02-06 ENCOUNTER — TELEPHONE (OUTPATIENT)
Dept: INTERNAL MEDICINE | Facility: CLINIC | Age: 58
End: 2023-02-06
Payer: MEDICARE

## 2023-02-08 ENCOUNTER — OFFICE VISIT (OUTPATIENT)
Dept: DERMATOLOGY | Facility: CLINIC | Age: 58
End: 2023-02-08
Payer: MEDICARE

## 2023-02-08 ENCOUNTER — TELEPHONE (OUTPATIENT)
Dept: OPHTHALMOLOGY | Facility: CLINIC | Age: 58
End: 2023-02-08

## 2023-02-08 ENCOUNTER — PRE VISIT (OUTPATIENT)
Dept: DERMATOLOGY | Facility: CLINIC | Age: 58
End: 2023-02-08

## 2023-02-08 DIAGNOSIS — D04.4 SQUAMOUS CELL CARCINOMA IN SITU (SCCIS) OF SCALP: Primary | ICD-10-CM

## 2023-02-08 DIAGNOSIS — E11.3313 TYPE 2 DIABETES MELLITUS WITH MODERATE NONPROLIFERATIVE RETINOPATHY OF BOTH EYES AND MACULAR EDEMA, UNSPECIFIED WHETHER LONG TERM INSULIN USE (H): Primary | ICD-10-CM

## 2023-02-08 PROCEDURE — 99213 OFFICE O/P EST LOW 20 MIN: CPT | Mod: GC | Performed by: DERMATOLOGY

## 2023-02-08 ASSESSMENT — PAIN SCALES - GENERAL: PAINLEVEL: NO PAIN (0)

## 2023-02-08 NOTE — LETTER
2/8/2023       RE: Frandy Workman  [address] [address]     Dear Colleague,    Thank you for referring your patient, Frandy Workman, to the [facility] [place] at [facility]. Please see a copy of my visit note below.    Mohs Micrographic Surgery Consult Note    Feb 8, 2023    Dermatology Problem List:  1. Hx transplant (liver, 11/11/2019), on IMSP (mycophenolate 500mg BID, prednisone 5mg daily)   2. History of NMSC  - SCCis, L temporal scalp, s/p shave 12/1/22  3. Folliculitis. BPO wash  4. Versicolor. Ketoconazole wash    Subjective: The patient is a 58 year old man who presents today for Mohs micrographic surgery consultation for a recent diagnosis of skin cancer.    Skin cancer(s): SCCis  Location(s): left temporal scalp  Associated symptoms: none  Onset: within last 1 year    No other associated symptoms, modifying factors, or prior treatments, except when noted above. The patient denies any constitutional symptoms, lymphadenopathy, unintentional weight loss or decreased appetite. No other skin concerns today.    Objective:   Gen: This is a well appearing individual in no acute distress. The patient is alert and oriented x 3.  An exam of the forehead was performed today and visualized the following:  - 1.2 cm pink papule on left superior forehead.    Assessment and Plan:     1. Plan for Mohs micrographic surgery for skin cancer(s) above:  - We discussed the nature of the diagnosis/condition above. We discussed the treatment options, including the risks benefits and expectations of these options. We recommend micrographic surgery as the most effective and most tissue sparing option for treatment, and the patient agrees to proceed with this.  The patient is aware of the risks, benefits and expectations of this procedure. The patient will be scheduled for this procedure, if not already done so.  - We  anticipate the following closure type: Linear closure, such as complex linear closure (CLC)    The patient was discussed with and evaluated by attending physician, Arnoldo Lyon MD.    Ha Max MD  Dermatology, PGY-5  Mohs surgery fellow    Scribe Disclosure:  I, Patrice Carter, am serving as a scribe to document services personally performed by Arnoldo Lyon MD based on data collection and the provider's statements to me.     Attending Attestation  I attest that the Fellow recorded the interview and exam that I personally performed.  I have reviewed the note and edited it as necessary.    Arnoldo Lyon M.D.  Professor  Director of Dermatologic Surgery  Department of Dermatology  [facility]

## 2023-02-08 NOTE — TELEPHONE ENCOUNTER
[facility] Call Center    Phone Message    May a detailed message be left on voicemail: yes     Reason for Call: Form or Letter   Type or form/letter needing completion: [place] from [employer] will be faxing over disability forms to the clinic today for Veronica to fill out. They would also like to have medical records sent back with the form. Release has been signed by pt.   Provider: Veronica  Date form needed: asap  Once completed: Fax form to: [phone]      Action Taken: Message routed to:  [facility]    Travel Screening: Not Applicable

## 2023-02-08 NOTE — NURSING NOTE
Chief Complaint   Patient presents with     mohs consult     Patient is here today for consult for mohs.      Oriana ERVIN CMA

## 2023-02-08 NOTE — PROGRESS NOTES
Mohs Micrographic Surgery Consult Note    Feb 8, 2023    Dermatology Problem List:  1. Hx transplant (liver, 11/11/2019), on IMSP (mycophenolate 500mg BID, prednisone 5mg daily)   2. History of NMSC  - SCCis, L temporal scalp, s/p shave 12/1/22  3. Folliculitis. BPO wash  4. Versicolor. Ketoconazole wash    Subjective: The patient is a 58 year old man who presents today for Mohs micrographic surgery consultation for a recent diagnosis of skin cancer.    Skin cancer(s): SCCis  Location(s): left temporal scalp  Associated symptoms: none  Onset: within last 1 year    No other associated symptoms, modifying factors, or prior treatments, except when noted above. The patient denies any constitutional symptoms, lymphadenopathy, unintentional weight loss or decreased appetite. No other skin concerns today.    Objective:   Gen: This is a well appearing individual in no acute distress. The patient is alert and oriented x 3.  An exam of the forehead was performed today and visualized the following:  - 1.2 cm pink papule on left superior forehead.    Assessment and Plan:     1. Plan for Mohs micrographic surgery for skin cancer(s) above:  - We discussed the nature of the diagnosis/condition above. We discussed the treatment options, including the risks benefits and expectations of these options. We recommend micrographic surgery as the most effective and most tissue sparing option for treatment, and the patient agrees to proceed with this.  The patient is aware of the risks, benefits and expectations of this procedure. The patient will be scheduled for this procedure, if not already done so.  - We anticipate the following closure type: Linear closure, such as complex linear closure (CLC)    The patient was discussed with and evaluated by attending physician, Arnoldo Lyon MD.    Ha Max MD  Dermatology, PGY-5  Mohs surgery fellow    Scribe Disclosure:  I, Partice Carter, am serving as a scribe to document services  personally performed by Arnoldo Lyon MD based on data collection and the provider's statements to me.     Attending Attestation  I attest that the Fellow recorded the interview and exam that I personally performed.  I have reviewed the note and edited it as necessary.    Arnoldo Lyon M.D.  Professor  Director of Dermatologic Surgery  Department of Dermatology  [facility]

## 2023-02-13 ENCOUNTER — TELEPHONE (OUTPATIENT)
Dept: ONCOLOGY | Facility: CLINIC | Age: 58
End: 2023-02-13
Payer: MEDICARE

## 2023-02-13 ENCOUNTER — PATIENT OUTREACH (OUTPATIENT)
Dept: NEPHROLOGY | Facility: CLINIC | Age: 58
End: 2023-02-13
Payer: MEDICARE

## 2023-02-13 ENCOUNTER — VIRTUAL VISIT (OUTPATIENT)
Dept: BEHAVIORAL HEALTH | Facility: CLINIC | Age: 58
End: 2023-02-13
Payer: MEDICARE

## 2023-02-13 DIAGNOSIS — F31.31 BIPOLAR 1 DISORDER, DEPRESSED, MILD (H): Primary | ICD-10-CM

## 2023-02-13 PROCEDURE — 90832 PSYTX W PT 30 MINUTES: CPT | Mod: VID | Performed by: PSYCHOLOGIST

## 2023-02-13 NOTE — TELEPHONE ENCOUNTER
LTD paperwork received via patient from [facility]. Will be placed in provider folder for signature upon completion.     Fax: [phone]      Wilian Michael

## 2023-02-13 NOTE — PROGRESS NOTES
[facility]: Integrated Behavioral Health  February 13, 2023        Behavioral Health Clinician Progress Note    Patient Name: Frandy Workman           Service Type: Video visit      Service Location:  Video visit      Session Start Time: 9:00  Session End Time:  9:16      Session Length: 16 - 37      Attendees: Patient    Visit Activities (Refresh list every visit): [facility] Only     Service Modality:  Video Visit:      Provider verified identity through the following two step process.  Patient provided:  Patient photo and Patient is known previously to provider    Telemedicine Visit: The patient's condition can be safely assessed and treated via synchronous audio and visual telemedicine encounter.      Reason for Telemedicine Visit: Services only offered telehealth    Originating Site (Patient Location): Patient's home    Distant Site (Provider Location): [facility]: [facility] [place]    Consent:  The patient/guardian has verbally consented to: the potential risks and benefits of telemedicine (video visit) versus in person care; bill my insurance or make self-payment for services provided; and responsibility for payment of non-covered services.     Patient would like the video invitation sent by:  My Chart    Mode of Communication:  Video Conference via [employer]well    Distant Location (Provider):  On-site    As the provider I attest to compliance with applicable laws and regulations related to telemedicine.      Diagnostic Assessment Date:  12/03/2020  Treatment Plan Review Date:  4/19/2023  See Flowsheets for today's PHQ-9 and LIZZETTE-7 results  Previous PHQ-9:   PHQ-9 SCORE 10/19/2022 11/21/2022 1/18/2023   PHQ-9 Total Score MyChart 2 (Minimal depression) 4 (Minimal depression) 8 (Mild depression)   PHQ-9 Total Score 2 4 8     Previous LIZZETTE-7:   LIZZETTE-7 SCORE 12/29/2020 4/7/2021 9/30/2021   Total Score 8 (mild anxiety) 9 (mild anxiety) -   Total Score 8 9 10       Extended Session (60+  minutes): No  Interactive Complexity: No  Crisis: No    Treatment Objective(s) Addressed in This Session:  Target Behavior(s): disease management/lifestyle changes  related to adaptive approaches to managing anxious distress and mood difficulties    Depressed mood: Increase interest, pleasure in doing things.  Diabetes Management    Current Stressors / Issues:  [facility] met with Miller today for a follow-up. Brief session with Miller today, mostly to check-in, see if he needed additional support/guidance on treatment of his emotional health. States being busy with several medical appointments, including recent cataract surgery. 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 Notes he is working on the goal we discussed about increasing engagement with movement/light exercise to help with depressed mood. States having success in walking to the gym each day, for about 10 minutes. Explored next steps for him with this, namely how he could improve 1-2 percent. Miller identified walking on the treadmill in the gym for an additional 30 minutes, though we agreed starting with 10 minutes might be more suitable for him right now. Continued to also provide supportive counseling and affirmed the steps taken toward adaptive change.     Answers for HPI/ROS submitted by the patient on 7/12/2022  If you checked off any problems, how difficult have these problems made it for you to do your work, take care of things at home, or get along with other people?: Somewhat difficult  PHQ9 TOTAL SCORE: 4    Answers for HPI/ROS submitted by the patient on 9/8/2022  If you checked off any problems, how difficult have these problems made it for you to do your work, take care of things at home, or get along with other people?: Somewhat difficult  PHQ9 TOTAL SCORE: 3    Answers for HPI/ROS submitted by the patient on 11/21/2022  If you checked off any problems, how difficult have these problems made it for you to do your  work, take care of things at home, or get along with other people?: Very difficult  PHQ9 TOTAL SCORE: 4      Answers for HPI/ROS submitted by the patient on 1/18/2023  If you checked off any problems, how difficult have these problems made it for you to do your work, take care of things at home, or get along with other people?: Extremely difficult  PHQ9 TOTAL SCORE: 8        Progress on Treatment Objective(s) / Homework:  Stable - ACTION (Actively working towards change); Intervened by reinforcing change plan / affirming steps taken      Solution-Focused Therapy    Explore patterns in patient's relationships and discuss options for new behaviors.    Explore patterns in patient's actions and choices and discuss options for new behaviors.    Behavioral Activation    Discuss steps patient can take to become more involved in meaningful activity.    Identify barriers to these activities and explore possible solutions.      Answers for HPI/ROS submitted by the patient on 3/21/2022  If you checked off any problems, how difficult have these problems made it for you to do your work, take care of things at home, or get along with other people?: Not difficult at all  PHQ9 TOTAL SCORE: 6      Answers for HPI/ROS submitted by the patient on 2/8/2022  If you checked off any problems, how difficult have these problems made it for you to do your work, take care of things at home, or get along with other people?: Somewhat difficult  PHQ9 TOTAL SCORE: 4      Answers for HPI/ROS submitted by the patient on 4/7/2021   LIZZETTE 7 TOTAL SCORE: 9  If you checked off any problems, how difficult have these problems made it for you to do your work, take care of things at home, or get along with other people?: Very difficult  PHQ9 TOTAL SCORE: 7*    *No SI    Answers for HPI/ROS submitted by the patient on 10/13/2020   If you checked off any problems, how difficult have these problems made it for you to do your work, take care of things at home,  or get along with other people?: Somewhat difficult  PHQ9 TOTAL SCORE: 8*  LIZZETTE 7 TOTAL SCORE: 6      *No SI     Answers for HPI/ROS submitted by the patient on 12/29/2020   If you checked off any problems, how difficult have these problems made it for you to do your work, take care of things at home, or get along with other people?: Somewhat difficult  PHQ9 TOTAL SCORE: 5*  LIZZETTE 7 TOTAL SCORE: 8    *No SI     Answers for HPI/ROS submitted by the patient on 11/21/2022  If you checked off any problems, how difficult have these problems made it for you to do your work, take care of things at home, or get along with other people?: Very difficult  PHQ9 TOTAL SCORE: 4          Care Plan review completed: no    Medication Review:  No changes to current psychiatric medication(s)     Current Outpatient Medications   Medication     Acetaminophen (TYLENOL) 325 MG CAPS     ammonium lactate (AMLACTIN) 12 % external cream     aspirin (SM ASPIRIN ADULT LOW STRENGTH) 81 MG EC tablet     BD VIKTORIA U/F 32G X 4 MM insulin pen needle     benzoyl peroxide 5 % external liquid     Continuous Blood Gluc  (Near PageYLE CLAUDIA 14 DAY READER) CECILIO     Continuous Blood Gluc Sensor (FREESTYLE CLAUDIA 2 SENSOR) MISC     dorzolamide-timolol (COSOPT) 2-0.5 % ophthalmic solution     empagliflozin (JARDIANCE) 10 MG TABS tablet     insulin aspart (NOVOLOG PEN) 100 UNIT/ML pen     insulin degludec (TRESIBA FLEXTOUCH) 100 UNIT/ML pen     ketoconazole (NIZORAL) 2 % external shampoo     ketorolac (ACULAR) 0.5 % ophthalmic solution     lamiVUDine (EPIVIR) 100 MG tablet     metFORMIN (GLUCOPHAGE XR) 500 MG 24 hr tablet     metoprolol succinate ER (TOPROL XL) 25 MG 24 hr tablet     Multiple Vitamin (TAB-A-GLADIS) TABS     mycophenolate (GENERIC EQUIVALENT) 250 MG capsule     ofloxacin (OCUFLOX) 0.3 % ophthalmic solution     order for DME     pantoprazole (PROTONIX) 40 MG EC tablet     prednisoLONE acetate (PRED FORTE) 1 % ophthalmic suspension     predniSONE  (DELTASONE) 5 MG tablet     rosuvastatin (CRESTOR) 5 MG tablet     tamsulosin (FLOMAX) 0.4 MG capsule     vitamin D3 (VITAMIN D3) 50 mcg (2000 units) tablet     Current Facility-Administered Medications   Medication     aflibercept (EYLEA) injection prefilled syringe 2 mg     aflibercept (EYLEA) injection prefilled syringe 2 mg         Medication Compliance:  Yes    Changes in Health Issues:   None reported    Chemical Use Review:   Substance Use: Chemical use reviewed, no active concerns identified      Tobacco Use: No current tobacco use.         Assessment: Current Emotional / Mental Status (status of significant symptoms):  Risk status (Self / Other harm or suicidal ideation)  Patient denies a history of suicidal ideation, suicide attempts, self-injurious behavior, homicidal ideation, homicidal behavior and and other safety concerns     Patient denies current fears or concerns for personal safety.  Patient denies current or recent suicidal ideation or behaviors.  Patient denies current or recent homicidal ideation or behaviors.  Patient denies current or recent self injurious behavior or ideation.  Patient denies other safety concerns.     A safety and risk management plan has not been developed at this time, however patient was encouraged to call [place] [phone] should there be a change in any of these risk factors.    Haines Suicide Severity Rating Scale (Short Version)  Haines Suicide Severity Rating (Short Version) 12/10/2019 6/11/2020 7/1/2020 7/12/2021 1/10/2022 1/3/2023 1/24/2023   Over the past 2 weeks have you felt down, depressed, or hopeless? yes no no no no no no   Over the past 2 weeks have you had thoughts of killing yourself? no no no no no no -   Comments - - - - - - -   Have you ever attempted to kill yourself? no no no no no no -   Q1 Wished to be Dead (Past Month) no - - - - no -   Comments - - - - - - -   Q2 Suicidal Thoughts (Past Month) no - - - - no -   Comments - - - - - - -   Q3  Suicidal Thought Method no - - - - no -   Q4 Suicidal Intent without Specific Plan no - - - - no -   Q5 Suicide Intent with Specific Plan no - - - - no -   Q6 Suicide Behavior (Lifetime) no - - - - no -   Level of Risk per Screen - - - - - low risk -         Appearance:   Appropriate   Eye Contact:   Good   Psychomotor Behavior: TD evident   Attitude:   Cooperative  Interested Friendly Pleasant  Orientation:   All  Speech   Rate / Production: Normal/ Responsive   Volume:  Normal   Mood:    Depressed ; improving  Affect:    Appropriate    Thought Content:  Clear   Thought Form:  Goal Directed  Logical   Insight:    Good     Diagnoses:  1. Bipolar 1 disorder, depressed, mild (H)          Collateral Reports Completed:  Not Applicable    Plan: (Homework, other):  Continue with this writer for individual psychotherapy in 2 wks. He will continue to work on managing depression and anxiety through achievable behavioral activation ideas. Information about behavioral activation provided  Today; he plans to increase physical activity by walking each day, leading up to light exercise around 10 minutes.  No CD issues.     Joseph Murillo, RED  February 13, 2023        ______________________________________________________________________                                            Individual Treatment Plan    Patient's Name: Frandy Workman  YOB: 1965    Date of Creation: 3/1/2022  Date Treatment Plan Last Reviewed/Revised: 1/19/2023    DSM5 Diagnoses: 296.51 Bipolar I Disorder Current or Most Recent Episode Depressed, Mild or 300.02 (F41.1) Generalized Anxiety Disorder  Psychosocial / Contextual Factors: Post Solid Organ Tx  PROMIS (reviewed every 90 days): 4    Referral / Collaboration:  Referral to another professional/service is not indicated at this time..    Anticipated number of session for this episode of care: 4-6  Anticipation frequency of session: Every other week  Anticipated Duration of each session:  "38-52 minutes  Treatment plan will be reviewed in 90 days or when goals have been changed.       MeasurableTreatment Goal(s) related to diagnosis / functional impairment(s)  Goal 1: Patient will experience a reduction in depressive symptoms along with a corresponding increase in positive emotion and life satisfaction.      Objective #A (Patient Action)    Patient will Increase interest, engagement, and pleasure in doing things.  Status: Continued - Date(s): 1/19/2023    Intervention(s)  Therapist will help patient identify pleasant and mastery oriented events that elicit positive, relaxed mood.    Objective #B  Patient will Decrease frequency and intensity of feeling down, depressed, hopeless.  Status: Continued - Date(s): 1/19/2023    Intervention(s)  Therapist will introduce patient to cognitive-behavioral and acceptance and commitment therapy topics aimed to help reduce depression and anxiety    Objective #C  Patient will Identify negative self-talk and behaviors: challenge core beliefs, myths, and actions  Improve concentration, focus, and mindfulness in daily activities .  Status: Continued - Date(s): COMPLETE    Intervention(s)  Therapist will help patient identify and manage negative self-talk and automatic thoughts; introduce patient to cognitive distortions; help patient develop cognitive diffusion techniques      Goal 2: Patient will experience a reduction in anxious symptoms, along with a corresponding increase in relaxed emotional states and life satisfaction.      Objective #A (Patient Action)  Patient will use cognitive-behavioral and thought diffusion strategies identified in therapy to challenge anxious thoughts.    Status: Continued - Date(s): COMPLETE    Intervention(s)  Therapist will utilize CBT and ACT ideas to help patient challenge anxious thoughts and reduce intensity/duration of anxious distress    Objective #B  Patient will use \"worry time\" each day for 15 minutes of scheduled worry and " then defer obsessive or anxious thinking until the next structured worry time.    Status: Continued - Date(s): COMPLETE    Intervention(s)  Therapist will teach patient how to effectively utilize worry time and/or thought logs/journals each day and incorporate more relaxation behaviors into their routine.    Objective #C  Patient will identify the stressors which contribute to feelings of anxiety  Patient will increase engagement in adaptive coping skills and recreational activities, such as exercise and healthy socialization, to manage distress.  Status: Continued - Date(s): COMPLETE    Intervention(s)  Therapist will help patient identify triggers/situational factors that contribute to anxiety and behavioral skills aimed to manage anxious distress.      Goal 3: Patiient will work toward adaptively managing bipolar-related depression and manic episodes      Objective #A (Patient Action)    Status: Continued - Date(s): COMPLETE    Patient will identify 2-3 signs or signals of emerging mood instability.    Intervention(s)  Therapist will provide educational materials on bipolar disorder and help identifying triggers for mood instability.    Objective #B  Patient will identify 2-3 strategies for more effectively managing Bipolar Disorder.    Status: Continued - Date(s): COMPLETE    Intervention(s)  Therapist will teach emotional recognition/identification. Skills aimed to effectively manage bipolar disorder.      Other Possible Therapeutic Intervention(s):    Psycho-education regarding mental health diagnoses and treatment options    Supportive Therapy    Provide affirmations, reflections, and establish working rapport    Emphasize and reflect on strength of therapeutic relationship    Skills training    Explore skills useful to client in current situation.    Skills include assertiveness, communication, conflict management, problem-solving, relaxation, etc.    Solution-Focused Therapy    Explore patterns in patient's  relationships and discuss options for new behaviors.    Explore patterns in patient's actions and choices and discuss options for new behaviors.    Cognitive-behavioral Therapy    Discuss common cognitive distortions, identify them in patient's life.    Explore ways to challenge, replace, and act against these cognitions.    Acceptance and Commitment Therapy    Explore and identify important values in patient's life.    Discuss ways to commit to behavioral activation around these values.    Psychodynamic psychotherapy    Discuss patient's emotional dynamics and issues and how they impact behaviors.    Explore patient's history of relationships and how they impact present behaviors.    Explore how to work with and make changes in these schemas and patterns.    Narrative Therapy    Explore the patient's story of his/her life from his/her perspective.    Explore alternate ways of understanding their experience, identifying exceptions, developing new themes.    Interpersonal Psychotherapy    Explore patterns in relationships that are effective or ineffective at helping patient reach their goals, find satisfying experience.    Discuss new patterns or behaviors to engage in for improved social functioning.    Behavioral Activation    Discuss steps patient can take to become more involved in meaningful activity.    Identify barriers to these activities and explore possible solutions.    Mindfulness-Based Strategies    Discuss skills based on development and application of mindfulness.    Skills drawn from compassion-focused therapy, dialectical behavior therapy, mindfulness-based stress reduction, mindfulness-based cognitive therapy, etc.      Patient has reviewed and agreed to the above plan.      Joseph Murillo Psy.D, LP   Behavioral Health Clinician   -[facility]     1/19/2023

## 2023-02-13 NOTE — PROGRESS NOTES
Dr. Massey would like to offer her CKD education class to all [facility] patients with a GFR of 45 or less. As the [facility] [place], Shruthi will be sending out [employer] messages offering either the early or late class depending on the patient's GFR. Patients last GRF 40. Sent [employer] message.     Cyndi Naidu RN, BSN   Nephrology RN Care Coordinator   [facility]

## 2023-02-14 DIAGNOSIS — E11.3293 TYPE 2 DIABETES MELLITUS WITH MILD NONPROLIFERATIVE RETINOPATHY OF BOTH EYES WITHOUT MACULAR EDEMA, UNSPECIFIED WHETHER LONG TERM INSULIN USE (H): ICD-10-CM

## 2023-02-14 DIAGNOSIS — Z94.4 STATUS POST LIVER TRANSPLANTATION (H): ICD-10-CM

## 2023-02-14 RX ORDER — MYCOPHENOLATE MOFETIL 250 MG/1
500 CAPSULE ORAL EVERY 12 HOURS
Qty: 120 CAPSULE | Refills: 11 | Status: SHIPPED | OUTPATIENT
Start: 2023-02-14 | End: 2024-02-19

## 2023-02-15 ENCOUNTER — OFFICE VISIT (OUTPATIENT)
Dept: DERMATOLOGY | Facility: CLINIC | Age: 58
End: 2023-02-15
Payer: MEDICARE

## 2023-02-15 ENCOUNTER — DOCUMENTATION ONLY (OUTPATIENT)
Dept: FAMILY MEDICINE | Facility: CLINIC | Age: 58
End: 2023-02-15

## 2023-02-15 VITALS — SYSTOLIC BLOOD PRESSURE: 114 MMHG | DIASTOLIC BLOOD PRESSURE: 73 MMHG | HEART RATE: 84 BPM

## 2023-02-15 DIAGNOSIS — Z95.1 S/P CABG (CORONARY ARTERY BYPASS GRAFT): ICD-10-CM

## 2023-02-15 DIAGNOSIS — D04.4 SQUAMOUS CELL CARCINOMA IN SITU (SCCIS) OF SCALP: Primary | ICD-10-CM

## 2023-02-15 PROCEDURE — 17311 MOHS 1 STAGE H/N/HF/G: CPT | Mod: GC | Performed by: DERMATOLOGY

## 2023-02-15 PROCEDURE — 13121 CMPLX RPR S/A/L 2.6-7.5 CM: CPT | Mod: GC | Performed by: DERMATOLOGY

## 2023-02-15 RX ORDER — METOPROLOL SUCCINATE 25 MG/1
12.5 TABLET, EXTENDED RELEASE ORAL DAILY
Qty: 45 TABLET | Refills: 1 | Status: SHIPPED | OUTPATIENT
Start: 2023-02-15 | End: 2023-10-02

## 2023-02-15 ASSESSMENT — PAIN SCALES - GENERAL: PAINLEVEL: NO PAIN (0)

## 2023-02-15 NOTE — PROGRESS NOTES
[facility] Mohs Surgery Procedure Note    Case #: 1  Date of Service:  Feb 15, 2023  Surgery: Mohs micrographic surgery (MMS)  Staff surgeon: Arnoldo Lyon MD  Fellow surgeon: Ha Max MD  Resident surgeon: Gustavo Garcia MD  Nurse: Oriana Reinoso CMA    Tumor Type: SCCis  Location: Left temporal scalp  Derm-Path Accession #: [id number]    Mohs Accession #:   Pre-Op Size: 1.0 cm x 1.0 cm  Final Defect Size: 1.5 cm x 2.0 cm  Number of Mohs stages: 1  Level of Defect: Fat  Local anesthetic: 12 mL 1% lidocaine with epinephrine  Repair Type: Complex linear  Repair Size: 4.0 cm  Suture Material: 4-0 Monocryl; 5-0 fast absorbing gut    Procedure:    Stage I  We discussed the principles of treatment and most likely complications including scarring, bleeding, infection, swelling, pain, crusting, nerve damage, large wound,  incomplete excision, wound dehiscence,  nerve damage, recurrence, and a second procedure may be recommended to obtain the best cosmetic or functional result.    Informed consent was obtained and the patient underwent the procedure as follows:  The patient was placed supine on the operating table.  The cancer was identified, outlined with a marker, and verified by the patient.  The entire surgical field was prepped with chlorhexidine.  The surgical site was anesthetized using lidocaine with epinephrine.    The area of clinically apparent tumor was debulked. The layer of tissue was then surgically excised using a #15 blade and was then transferred onto a specimen sheet maintaining the orientation of the specimen. Hemostasis was obtained using electrocoagulation. The wound site was then covered with a dressing while the tissue samples were processed for examination.    The excised tissue was transported to the Mohs histology laboratory maintaining the tissue orientation.  The tissue specimen was relaxed so that the entire surgical margin was in a a single horizontal plane for  sectioning and inked for precise mapping.  A precise reference map was drawn to reflect the sectioning of the specimen, colored inking of the margins, and orientation on the patient.  The tissue was processed using horizontal sectioning of the base and continuous peripheral margins.  The histopathologic sections were reviewed in conjunction with the reference map.    Total blocks: 1  Total slides: 2    There were no cancer cells visualized on examination, therefore Mohs surgery was complete.    REPAIR:     Due to one or more of the following factors, complex linear closure was required/indicated: Extensive undermining (equal to or greater than the maximum perpendicular width of the defect), exposure of underlying structure (bone, cartilage, tendon, named neurovascular), free margin involvement (helical rim, vermillion border, nostril rim), and/or placement of retention sutures.    After the excision of the tumor, the area was extensively and carefully undermined using tissue scissors. Hemostasis was obtained with spot electrocautery and ligation of vessels where necessary. Initial deep plication sutures of 4-0 Monocryl sutures were placed in the deep, subcutaneous, and fascial planes to appose the lateral margins. The subcutaneous and dermal layers were then closed with 4-0 Monocryl sutures. The epidermis was then carefully approximated along the length of the wound using 5-0 fast absorbing gut running sutures.     Estimated blood loss was less than 10 ml for all surgical sites. A sterile pressure dressing was applied and wound care instructions, with a written handout, were given. The patient was [facility] alert and ambulatory.    Arnoldo Lyon MD staffed the patient and was present for the key portions of the above procedure.     Dr. Ha Max (Mohs micrographic surgery fellow) performed the micrographic surgery; and Arnoldo Lyon MD performed the reconstruction/repair and was  present for the entire micrographic surgery and always immediately available.    Ha Max MD  Dermatology, PGY-5  Mohs surgery fellow      Attending attestation:  I was present for key elements of the procedure and immediately available for all other portions of the procedure.  I have reviewed the note and edited it as necessary.    Arnoldo Lyon M.D.  Professor  Director of Dermatologic Surgery  Department of Dermatology  [facility]    Dermatology Surgery Clinic  [facility] [place] [phone]

## 2023-02-15 NOTE — PROGRESS NOTES
Type of Form Received: FMLA    Form Received (Date) 2/15/23   Form Filled out Yes 2/27/23   Placed in provider folder Yes

## 2023-02-15 NOTE — LETTER
2/15/2023       RE: Frandy Workman  [address] [facility] [address]     Dear Colleague,    Thank you for referring your patient, Frandy Workman, to the [facility] DERMATOLOGIC SURGERY CLINIC [place] at [facility]. Please see a copy of my visit note below.    [facility] Mohs Surgery Procedure Note    Case #: 1  Date of Service:  Feb 15, 2023  Surgery: Mohs micrographic surgery (MMS)  Staff surgeon: Arnoldo Lyon MD  Fellow surgeon: Ha Max MD  Resident surgeon: Gustavo Garcia MD  Nurse: Oriana Reinoso CMA    Tumor Type: SCCis  Location: Left temporal scalp  Derm-Path Accession #: [id number]    Mohs Accession #:   Pre-Op Size: 1.0 cm x 1.0 cm  Final Defect Size: 1.5 cm x 2.0 cm  Number of Mohs stages: 1  Level of Defect: Fat  Local anesthetic: 12 mL 1% lidocaine with epinephrine  Repair Type: Complex linear  Repair Size: 4.0 cm  Suture Material: 4-0 Monocryl; 5-0 fast absorbing gut    Procedure:    Stage I  We discussed the principles of treatment and most likely complications including scarring, bleeding, infection, swelling, pain, crusting, nerve damage, large wound,  incomplete excision, wound dehiscence,  nerve damage, recurrence, and a second procedure may be recommended to obtain the best cosmetic or functional result.    Informed consent was obtained and the patient underwent the procedure as follows:  The patient was placed supine on the operating table.  The cancer was identified, outlined with a marker, and verified by the patient.  The entire surgical field was prepped with chlorhexidine.  The surgical site was anesthetized using lidocaine with epinephrine.    The area of clinically apparent tumor was debulked. The layer of tissue was then surgically excised using a #15 blade and was then transferred onto a specimen sheet maintaining the orientation of the specimen. Hemostasis was obtained using  electrocoagulation. The wound site was then covered with a dressing while the tissue samples were processed for examination.    The excised tissue was transported to the Mohs histology laboratory maintaining the tissue orientation.  The tissue specimen was relaxed so that the entire surgical margin was in a a single horizontal plane for sectioning and inked for precise mapping.  A precise reference map was drawn to reflect the sectioning of the specimen, colored inking of the margins, and orientation on the patient.  The tissue was processed using horizontal sectioning of the base and continuous peripheral margins.  The histopathologic sections were reviewed in conjunction with the reference map.    Total blocks: 1  Total slides: 2    There were no cancer cells visualized on examination, therefore Mohs surgery was complete.    REPAIR:     Due to one or more of the following factors, complex linear closure was required/indicated: Extensive undermining (equal to or greater than the maximum perpendicular width of the defect), exposure of underlying structure (bone, cartilage, tendon, named neurovascular), free margin involvement (helical rim, vermillion border, nostril rim), and/or placement of retention sutures.    After the excision of the tumor, the area was extensively and carefully undermined using tissue scissors. Hemostasis was obtained with spot electrocautery and ligation of vessels where necessary. Initial deep plication sutures of 4-0 Monocryl sutures were placed in the deep, subcutaneous, and fascial planes to appose the lateral margins. The subcutaneous and dermal layers were then closed with 4-0 Monocryl sutures. The epidermis was then carefully approximated along the length of the wound using 5-0 fast absorbing gut running sutures.     Estimated blood loss was less than 10 ml for all surgical sites. A sterile pressure dressing was applied and wound care instructions, with a written handout, were given.  The patient was [facility] alert and ambulatory.    Arnoldo Lyon MD staffed the patient and was present for the key portions of the above procedure.     Dr. Ha Max (Mohs micrographic surgery fellow) performed the micrographic surgery; and Arnoldo Lyon MD performed the reconstruction/repair and was present for the entire micrographic surgery and always immediately available.    Ha Max MD  Dermatology, PGY-5  Mohs surgery fellow      Attending attestation:  I was present for key elements of the procedure and immediately available for all other portions of the procedure.  I have reviewed the note and edited it as necessary.    Arnoldo Lyon M.D.  Professor  Director of Dermatologic Surgery  Department of Dermatology  [facility]    Dermatology Surgery Clinic  [facility]  [address] [place], [place] [id number]

## 2023-02-15 NOTE — NURSING NOTE
Chief Complaint   Patient presents with     mohs     Mohs on left temporal scalp/      Oriana ERVIN CMA    
full weight-bearing

## 2023-02-15 NOTE — PATIENT INSTRUCTIONS
Wound care    I will experience scar, bleeding, swelling, pain, crusting and redness. I may experience incomplete removal or recurrence. Risks are bleeding, bruising, swelling, infection, nerve damage, & a large wound. A second procedure may be recommended to obtain the best cosmetic or functional result.       A three month office visit with your Surgeon is recommended for scar evaluation. Please reach out sooner if you have concerns about you surgical site/wound.    Caring for your skin after surgery    After your surgery, a pressure bandage will be placed over the area that has stitches. This is important to prevent bleeding. Please follow these instructions over the next 1 to 2 weeks. Following this regimen will help to prevent complications as your wound heals.     For the first 48 hours after your surgery:    Leave the pressure dressing on and keep it dry. If it should come loose, you may re-tape it, but do not take it off.  Relax and take it easy. Do not do any vigorous exercise or heavy lifting. This could cause the wound to bleed.  Post-Operative pain is usually mild. If you are able to take tylenol, You may take plain or extra-strength Tylenol (acetaminophen) As directed on the bottle (do not take more than 4,000mg in one day). If you are able to take ibuprofen, you can alternate the tylenol and ibuprofen.   Avoid alcohol as this may increase your tendency to bleed.   You may put an ice pack around the bandaged area for 20 minutes at a time as needed. This may help reduce swelling, bruising, and pain. Make sure the ice pack is waterproof so that the pressure bandage doesn t get wet.  If the wound is on the face try to sleep with your head elevated. Either in a recliner or propped up in bed, this will decrease swelling around the eyes.   You may see a small amount of drainage or blood on your pressure bandage. This is normal. However:  If drainage or bleeding continues or saturates the bandage, you will  need to apply firm pressure over the bandage with a piece of gauze for 15 minutes.  If bleeding continues after applying pressure for 15 minutes, apply an ice pack to the bandaged area for 15 minutes.  If bleeding still continues, call our office or go to the nearest emergency room.    Remove pressure dressing 48 hours after surgery:    Carefully remove the pressure bandage. If it seems sticky or too difficult to get off, you may need to soak it off in the shower.  After the pressure dressing is removed, you may shower and get the wound wet. However, Do Not let the forceful stream of the shower hit the wound directly.  Follow these wound care and dressing change instructions:   In the shower was the surgical site last with its own separate was cloth.  You may allow water to run over the site. Take a clean wash cloth wet with soapy warm water and gently pat the suture site to help remove any crust or drainage.   Do Not rub or scrub the site    After site is clean pat dry and apply a thin layer of Vaseline ointment  over the suture site with a cotton swab or clean finger.   Cover the suture site with Telfa (non-stick) dressing. You may tape a piece of gauze over the Telfa for extra protection if you wish.  Continue wound care at least once a day, twice if you are active or around a contaminated environment.  Continue daily wound care until your surgical site is completely healed. To determine this, around 2 weeks post procedure you can take a cotton swab with a small amount of Hydrogen peroxide and roll it on the suture site. If the site bubbles and turns white your wound is still healing. Please continue wound care until the area no longer bubbles up from the hydrogen peroxide.   Dissolving stitches, if you have been told your stitches are dissolving they should dissolve in one to one and a half week. If the stiches do not dissolve by one and a half week you can use a Qtip with hydrogen peroxide on it and roll it  "along the suture line to help the stitches dissolve and come out. Please give us a call if stitches are still in after two weeks. If you do not keep your wound moist at all times while it heals the dissolving sutures will dry out and not dissolve.     If you are able to take acetaminophen (\"Tylenol,\" etc.) and ibuprofen (\"Advil\" or \"Motrin,\" etc.), then you may STAGGER these medications by taking 400 mg of ibuprofen (usually two tabs) every 8 hours and 1,000 mg of acetaminophen (e.g., two tabs of extra-strength Tylenol) every 8 hours.    This means, for example, that you could take the followin,000 mg of Tylenol, followed 4 hours later by 400 mg Ibuprofen, followed 4 hours later with 1,000 mg of Tylenol, followed 4 hours later by 400 mg Ibuprofen, followed 4 hours later with 1,000 mg of Tylenol, and so forth.     Essentially, you can take either 1,000 mg of Tylenol or 400 mg of ibuprofen in alternating fashion EVERY FOUR HOURS.    Do NOT exceed more than 4,000 mg of Tylenol or 3,200 mg of ibuprofen per 24 hours. If you are not able to take Tylenol or ibuprofen as above due to other health issues (or a physician has told you directly that you are not allowed to take one of them, say due to pre-existing severe liver or kidney issues), then disregard the above directions.    Scientific evidence supports that this combination/schedule of pain medications is just as effective, if not more effective, than taking a narcotic pain medicine.       Follow up will be a 3 month scar evaluation either in person or via a telephone visit with you sending in a photo via [employer]. Unless you have been told to follow up sooner or if you have concerns and would like to be see sooner. Please call or send us in a [employer] message if possible and attach a photo.        What to expect:    The first couple of days your wound may be tender and may bleed slightly when doing wound care.  There may be swelling and bruising around the wound, " especially if it is near the eyes. For your comfort, you may apply ice or cold compresses to the bruises after your have removed the pressure bandage.  The area around your wound may be numb for several weeks or even months.  You may experience periodic sharp pain or mild itching around the wound as it heals.   The suture line will look dark for a while but will lighten over time.       When to call us:    You have bleeding that will not stop after applying pressure and ice.  You have pain that is not controlled with Tylenol (acetaminophen.)  You have signs or symptoms of an infection such as:  Fever over 100 degrees Fahrenheit  Redness, warmth or foul-smelling drainage from the wound  If you have any questions, or are not sure how to take care of the wound.    Phone numbers:    During business hours (M-F 8:00-4:30 p.m.)  [facility]-  [phone] Option 1 appt. desk  [phone]  Option 3 nurse triage line  ---------------------------------------------------------  Evenings/Weekends/Holidays  Hospital - [phone]   TTY for hearing cfhstqej-[phone]  *Ask  to page dermatologist on-call  Emergency [place]-[phone]  TTY for hearing impaired- [phone]

## 2023-02-16 ENCOUNTER — TELEPHONE (OUTPATIENT)
Dept: TRANSPLANT | Facility: CLINIC | Age: 58
End: 2023-02-16
Payer: MEDICARE

## 2023-02-16 NOTE — TELEPHONE ENCOUNTER
Left a message stating that we received a long term disability form but the form actually needs to go to the PCP. Have the disability office fax it there.

## 2023-02-21 NOTE — TELEPHONE ENCOUNTER
Signed form received and faxed to [employer], fax # [phone]. Successful transmission verified via rightfax. Copy of forms sent to scanning, original forms mailed to patient's home address on file.    Wilian Michael on 2/21/2023 at 8:14 AM

## 2023-02-22 ENCOUNTER — OFFICE VISIT (OUTPATIENT)
Dept: INTERNAL MEDICINE | Facility: CLINIC | Age: 58
End: 2023-02-22
Payer: MEDICARE

## 2023-02-22 VITALS
SYSTOLIC BLOOD PRESSURE: 166 MMHG | WEIGHT: 205.5 LBS | HEIGHT: 69 IN | HEART RATE: 81 BPM | OXYGEN SATURATION: 99 % | BODY MASS INDEX: 30.44 KG/M2 | DIASTOLIC BLOOD PRESSURE: 88 MMHG

## 2023-02-22 DIAGNOSIS — Z95.1 S/P CABG (CORONARY ARTERY BYPASS GRAFT): ICD-10-CM

## 2023-02-22 DIAGNOSIS — F31.31 BIPOLAR 1 DISORDER, DEPRESSED, MILD (H): ICD-10-CM

## 2023-02-22 DIAGNOSIS — Z94.4 LIVER TRANSPLANT RECIPIENT (H): Primary | ICD-10-CM

## 2023-02-22 DIAGNOSIS — E11.3293 TYPE 2 DIABETES MELLITUS WITH MILD NONPROLIFERATIVE RETINOPATHY OF BOTH EYES WITHOUT MACULAR EDEMA, UNSPECIFIED WHETHER LONG TERM INSULIN USE (H): ICD-10-CM

## 2023-02-22 DIAGNOSIS — M54.50 CHRONIC RIGHT-SIDED LOW BACK PAIN WITHOUT SCIATICA: ICD-10-CM

## 2023-02-22 DIAGNOSIS — C22.0 HCC (HEPATOCELLULAR CARCINOMA) (H): ICD-10-CM

## 2023-02-22 DIAGNOSIS — H43.12 VITREOUS HEMORRHAGE OF LEFT EYE (H): ICD-10-CM

## 2023-02-22 DIAGNOSIS — I10 PRIMARY HYPERTENSION: ICD-10-CM

## 2023-02-22 DIAGNOSIS — G89.29 CHRONIC RIGHT-SIDED LOW BACK PAIN WITHOUT SCIATICA: ICD-10-CM

## 2023-02-22 PROBLEM — I12.0 HYPERTENSIVE CHRONIC KIDNEY DISEASE WITH STAGE 5 CHRONIC KIDNEY DISEASE OR END STAGE RENAL DISEASE (H): Status: ACTIVE | Noted: 2023-02-22

## 2023-02-22 PROCEDURE — 99215 OFFICE O/P EST HI 40 MIN: CPT | Performed by: INTERNAL MEDICINE

## 2023-02-22 NOTE — NURSING NOTE
Frandy Workman is a 58 year old male patient that presents today in clinic for the following:    Chief Complaint   Patient presents with     RECHECK     Disability forms     The patient's allergies and medications were reviewed as noted. A set of vitals were recorded as noted without incident. The patient does not have any other questions for the provider.    Tyrone Holbrook, EMT at 4:17 PM on 2/22/2023

## 2023-02-22 NOTE — PROGRESS NOTES
Assessment & Plan     Liver transplant recipient (H)  and  HCC (hepatocellular carcinoma) (H)  and  S/P CABG (coronary artery bypass graft)  and  Type 2 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, unspecified whether long term insulin use (H)  and  Vitreous hemorrhage of left eye (H)  and  Bipolar 1 disorder, depressed, mild (H)  and  Chronic right-sided low back pain without sciatica    Disability paperwork completed with info as noted in HPI.  Form placed in my outbasket.     Primary hypertension    BP high in clinic today but other clinic BPs have been good recently.  He is going to resume home monitoring.     Discussed getting 2nd Shingrix at pharmacy.  Discussed colonoscopy due Dec 2024 (5 year follow-up from last scope due to family history).     51 minutes spent on the date of the encounter doing chart review, history and exam, documentation and further activities per the note      Bentley Brunner MD  [facility] INTERNAL MEDICINE [place]    Subjective   Miller is a 58 year old, presenting for the following health issues:  RECHECK (Disability forms)      HPI     Miller presents to discuss long term disability paperwork.  He has a history of HCC s/p liver transplant, DM2, CAD with NSTEMI s/p bypass, CKD3b, bipolar disorder, depression, and anxiety.  Miller has been on permanent disability since July 2014 when his diabetes was out of control and he was newly diagnosed with liver disease, he reports he was comatose for 4-5 days due to organ failure.     Miller reports that he is currently mostly limited by his DM2 and CKD and transplant causing a lot of chronic fatigue.  He reports trouble tolerating stress, will develop increased fatigue.  He sleeps a lot during the day despite sleeping okay at night.  He reports he typically uses a cane for walking due to chronic right low back pain, can go no more than a mile.  Can only stand 15-20 minutes at a time.  He  "is only able to lift about 10 pounds.  Bending is usually okay.  He follows with psychology for the bipolar disorder, doing okay without meds but reports trouble with focus.  He has neuropathy in the feet.      He previously worked as a  of finance, was a CPA.       Miller reports having bypass surgery, skin cancer surgery, and cataract surgery since his [employer] form was last completed.   He is scheduled to continue receive eye injections over the next few month.      BP today is high at 166/88, but was good at 114/73 on 2/15.        Review of Systems   Constitutional, MSK, psych systems are negative, except as otherwise noted.      Objective    BP (!) 166/88 (BP Location: Right arm, Patient Position: Sitting, Cuff Size: Adult Regular)   Pulse 81   Ht 1.753 m (5' 9.02\")   Wt 93.2 kg (205 lb 8 oz)   SpO2 99%   BMI 30.33 kg/m    Body mass index is 30.33 kg/m .  Physical Exam   GENERAL: alert and no distress  PSYCH: mentation appears normal, affect normal/bright  MSK: Right low back pain on palpation, reduced ROM to R twist and bending, unsteady tandem gait.                 "

## 2023-02-22 NOTE — PATIENT INSTRUCTIONS
Check your blood pressure at home and let us know if it is frequently over 130/80.      Check the pharmacy for the 2nd dose of the Shingrix (shingles) vaccines.

## 2023-02-27 ENCOUNTER — TELEPHONE (OUTPATIENT)
Dept: SLEEP MEDICINE | Facility: CLINIC | Age: 58
End: 2023-02-27
Payer: MEDICARE

## 2023-02-27 NOTE — TELEPHONE ENCOUNTER
Spoke with rep at [facility]. Let them know patient's first appointment is 5/3/23. Fax number updated.     Charlene SERRATO RN  [facility]

## 2023-02-27 NOTE — TELEPHONE ENCOUNTER
Reason for Call:  Other call back    Detailed comments: Bentley from [employer] called.    Would like the TC from Calvin Lombardi at [facility] to callback on date of service for this patient.    Thank you.    Phone Number Patient can be reached at: Other phone number:  [phone]*    Best Time: any    Can we leave a detailed message on this number? NO    Call taken on 2/27/2023 at 10:52 AM by Lupe Yancey

## 2023-02-28 ENCOUNTER — TELEPHONE (OUTPATIENT)
Dept: ENDOCRINOLOGY | Facility: CLINIC | Age: 58
End: 2023-02-28
Payer: MEDICARE

## 2023-02-28 ASSESSMENT — PATIENT HEALTH QUESTIONNAIRE - PHQ9
10. IF YOU CHECKED OFF ANY PROBLEMS, HOW DIFFICULT HAVE THESE PROBLEMS MADE IT FOR YOU TO DO YOUR WORK, TAKE CARE OF THINGS AT HOME, OR GET ALONG WITH OTHER PEOPLE: VERY DIFFICULT
SUM OF ALL RESPONSES TO PHQ QUESTIONS 1-9: 6
SUM OF ALL RESPONSES TO PHQ QUESTIONS 1-9: 6

## 2023-03-01 ENCOUNTER — VIRTUAL VISIT (OUTPATIENT)
Dept: BEHAVIORAL HEALTH | Facility: CLINIC | Age: 58
End: 2023-03-01
Payer: MEDICARE

## 2023-03-01 DIAGNOSIS — F31.31 BIPOLAR 1 DISORDER, DEPRESSED, MILD (H): Primary | ICD-10-CM

## 2023-03-01 PROCEDURE — 90832 PSYTX W PT 30 MINUTES: CPT | Mod: VID | Performed by: PSYCHOLOGIST

## 2023-03-01 NOTE — PROGRESS NOTES
[facility]: Integrated Behavioral Health  March 1, 2023        Behavioral Health Clinician Progress Note    Patient Name: Frandy Workman           Service Type: Video visit      Service Location:  Video visit      Session Start Time: 2:00  Session End Time:  2:20      Session Length: 16 - 37      Attendees: Patient    Visit Activities (Refresh list every visit): [facility] Only     Service Modality:  Video Visit:      Provider verified identity through the following two step process.  Patient provided:  Patient photo and Patient is known previously to provider    Telemedicine Visit: The patient's condition can be safely assessed and treated via synchronous audio and visual telemedicine encounter.      Reason for Telemedicine Visit: Services only offered telehealth    Originating Site (Patient Location): Patient's home    Distant Site (Provider Location): Provider Remote Setting- Home Office    Consent:  The patient/guardian has verbally consented to: the potential risks and benefits of telemedicine (video visit) versus in person care; bill my insurance or make self-payment for services provided; and responsibility for payment of non-covered services.     Patient would like the video invitation sent by:  My Chart    Mode of Communication:  Video Conference via [employer]    Distant Location (Provider):  Off-site    As the provider I attest to compliance with applicable laws and regulations related to telemedicine.      Diagnostic Assessment Date:  12/03/2020  Treatment Plan Review Date:  4/19/2023  See Flowsheets for today's PHQ-9 and LIZZETTE-7 results  Previous PHQ-9:   PHQ-9 SCORE 11/21/2022 1/18/2023 2/28/2023   PHQ-9 Total Score MyChart 4 (Minimal depression) 8 (Mild depression) 6 (Mild depression)   PHQ-9 Total Score 4 8 6     Previous LIZZETTE-7:   LIZZETTE-7 SCORE 12/29/2020 4/7/2021 9/30/2021   Total Score 8 (mild anxiety) 9 (mild anxiety) -   Total Score 8 9 10       Extended Session (60+  minutes): No  Interactive Complexity: No  Crisis: No    Treatment Objective(s) Addressed in This Session:  Target Behavior(s): disease management/lifestyle changes  related to adaptive approaches to managing anxious distress and mood difficulties    Depressed mood: Increase interest, pleasure in doing things.      Current Stressors / Issues:  [facility] met with Miller today for a follow-up. Brief session with Miller today, mostly to check-in, see if he needed additional support/guidance on treatment of his emotional health. Reports doing well overall, working on his exercise each day and taking care of his health. Per his goal, he is walking about 1600 steps to the gym and back each day, which we agreed is success for him. He notes interest in getting more steps in and we discussed his objectives around this. Miller shared his intention to complete a sleep study in May per suggestion by his PCP for screening for sleep apnea. Provided Miller basic psycho-education about sleep apnea and how this can affect energy levels during the day. Continued to also provide supportive counseling.     Answers for HPI/ROS submitted by the patient on 7/12/2022  If you checked off any problems, how difficult have these problems made it for you to do your work, take care of things at home, or get along with other people?: Somewhat difficult  PHQ9 TOTAL SCORE: 4    Answers for HPI/ROS submitted by the patient on 9/8/2022  If you checked off any problems, how difficult have these problems made it for you to do your work, take care of things at home, or get along with other people?: Somewhat difficult  PHQ9 TOTAL SCORE: 3    Answers for HPI/ROS submitted by the patient on 11/21/2022  If you checked off any problems, how difficult have these problems made it for you to do your work, take care of things at home, or get along with other people?: Very difficult  PHQ9 TOTAL SCORE: 4      Answers for HPI/ROS submitted by the patient on 1/18/2023  If you checked  off any problems, how difficult have these problems made it for you to do your work, take care of things at home, or get along with other people?: Extremely difficult  PHQ9 TOTAL SCORE: 8        Progress on Treatment Objective(s) / Homework:  Stable - ACTION (Actively working towards change); Intervened by reinforcing change plan / affirming steps taken      Solution-Focused Therapy    Explore patterns in patient's relationships and discuss options for new behaviors.    Explore patterns in patient's actions and choices and discuss options for new behaviors.    Behavioral Activation    Discuss steps patient can take to become more involved in meaningful activity.    Identify barriers to these activities and explore possible solutions.      Answers for HPI/ROS submitted by the patient on 3/21/2022  If you checked off any problems, how difficult have these problems made it for you to do your work, take care of things at home, or get along with other people?: Not difficult at all  PHQ9 TOTAL SCORE: 6      Answers for HPI/ROS submitted by the patient on 2/8/2022  If you checked off any problems, how difficult have these problems made it for you to do your work, take care of things at home, or get along with other people?: Somewhat difficult  PHQ9 TOTAL SCORE: 4      Answers for HPI/ROS submitted by the patient on 4/7/2021   LIZZETTE 7 TOTAL SCORE: 9  If you checked off any problems, how difficult have these problems made it for you to do your work, take care of things at home, or get along with other people?: Very difficult  PHQ9 TOTAL SCORE: 7*    *No SI    Answers for HPI/ROS submitted by the patient on 10/13/2020   If you checked off any problems, how difficult have these problems made it for you to do your work, take care of things at home, or get along with other people?: Somewhat difficult  PHQ9 TOTAL SCORE: 8*  LIZZETTE 7 TOTAL SCORE: 6      *No SI     Answers for HPI/ROS submitted by the patient on 12/29/2020   If you  checked off any problems, how difficult have these problems made it for you to do your work, take care of things at home, or get along with other people?: Somewhat difficult  PHQ9 TOTAL SCORE: 5*  LIZZETTE 7 TOTAL SCORE: 8    *No SI     Answers for HPI/ROS submitted by the patient on 11/21/2022  If you checked off any problems, how difficult have these problems made it for you to do your work, take care of things at home, or get along with other people?: Very difficult  PHQ9 TOTAL SCORE: 4          Care Plan review completed: no    Medication Review:  No changes to current psychiatric medication(s)     Current Outpatient Medications   Medication     Acetaminophen (TYLENOL) 325 MG CAPS     ammonium lactate (AMLACTIN) 12 % external cream     aspirin (SM ASPIRIN ADULT LOW STRENGTH) 81 MG EC tablet     BD VIKTORIA U/F 32G X 4 MM insulin pen needle     benzoyl peroxide 5 % external liquid     Continuous Blood Gluc  (PangoYLE CLAUDIA 14 DAY READER) CECILIO     Continuous Blood Gluc Sensor (FREESTYLE CLAUDIA 2 SENSOR) MISC     dorzolamide-timolol (COSOPT) 2-0.5 % ophthalmic solution     empagliflozin (JARDIANCE) 10 MG TABS tablet     insulin aspart (NOVOLOG PEN) 100 UNIT/ML pen     insulin degludec (TRESIBA FLEXTOUCH) 100 UNIT/ML pen     ketoconazole (NIZORAL) 2 % external shampoo     ketorolac (ACULAR) 0.5 % ophthalmic solution     lamiVUDine (EPIVIR) 100 MG tablet     metFORMIN (GLUCOPHAGE XR) 500 MG 24 hr tablet     metoprolol succinate ER (TOPROL XL) 25 MG 24 hr tablet     Multiple Vitamin (TAB-A-GLADIS) TABS     mycophenolate (GENERIC EQUIVALENT) 250 MG capsule     ofloxacin (OCUFLOX) 0.3 % ophthalmic solution     order for DME     pantoprazole (PROTONIX) 40 MG EC tablet     prednisoLONE acetate (PRED FORTE) 1 % ophthalmic suspension     predniSONE (DELTASONE) 5 MG tablet     rosuvastatin (CRESTOR) 5 MG tablet     tamsulosin (FLOMAX) 0.4 MG capsule     vitamin D3 (VITAMIN D3) 50 mcg (2000 units) tablet     Current  Facility-Administered Medications   Medication     aflibercept (EYLEA) injection prefilled syringe 2 mg     aflibercept (EYLEA) injection prefilled syringe 2 mg         Medication Compliance:  Yes    Changes in Health Issues:   None reported    Chemical Use Review:   Substance Use: Chemical use reviewed, no active concerns identified      Tobacco Use: No current tobacco use.         Assessment: Current Emotional / Mental Status (status of significant symptoms):  Risk status (Self / Other harm or suicidal ideation)  Patient denies a history of suicidal ideation, suicide attempts, self-injurious behavior, homicidal ideation, homicidal behavior and and other safety concerns     Patient denies current fears or concerns for personal safety.  Patient denies current or recent suicidal ideation or behaviors.  Patient denies current or recent homicidal ideation or behaviors.  Patient denies current or recent self injurious behavior or ideation.  Patient denies other safety concerns.     A safety and risk management plan has not been developed at this time, however patient was encouraged to call [place] [phone] should there be a change in any of these risk factors.    [place] Suicide Severity Rating Scale (Short Version)  [place] Suicide Severity Rating (Short Version) 12/10/2019 6/11/2020 7/1/2020 7/12/2021 1/10/2022 1/3/2023 1/24/2023   Over the past 2 weeks have you felt down, depressed, or hopeless? yes no no no no no no   Over the past 2 weeks have you had thoughts of killing yourself? no no no no no no -   Comments - - - - - - -   Have you ever attempted to kill yourself? no no no no no no -   Q1 Wished to be Dead (Past Month) no - - - - no -   Comments - - - - - - -   Q2 Suicidal Thoughts (Past Month) no - - - - no -   Comments - - - - - - -   Q3 Suicidal Thought Method no - - - - no -   Q4 Suicidal Intent without Specific Plan no - - - - no -   Q5 Suicide Intent with Specific Plan no - - - - no -   Q6 Suicide  Behavior (Lifetime) no - - - - no -   Level of Risk per Screen - - - - - low risk -         Appearance:   Appropriate   Eye Contact:   Good   Psychomotor Behavior: TD evident   Attitude:   Cooperative  Interested Friendly Pleasant  Orientation:   All  Speech   Rate / Production: Normal/ Responsive   Volume:  Normal   Mood:    Depressed ; improving  Affect:    Appropriate    Thought Content:  Clear   Thought Form:  Goal Directed  Logical   Insight:    Good     Diagnoses:  1. Bipolar 1 disorder, depressed, mild (H)          Collateral Reports Completed:  Not Applicable    Plan: (Homework, other):  Continue with this writer for individual psychotherapy in 2 wks. He will continue to work on managing depression and anxiety through achievable behavioral activation ideas. Information about behavioral activation provided  Today; he plans to increase physical activity by walking each day, leading up to light exercise around 10 minutes.  No CD issues.     Joseph Murillo, RED  March 1, 2023          ______________________________________________________________________                                            Individual Treatment Plan    Patient's Name: Frandy Workman  YOB: 1965    Date of Creation: 3/1/2022  Date Treatment Plan Last Reviewed/Revised: 1/19/2023    DSM5 Diagnoses: 296.51 Bipolar I Disorder Current or Most Recent Episode Depressed, Mild or 300.02 (F41.1) Generalized Anxiety Disorder  Psychosocial / Contextual Factors: Post Solid Organ Tx  PROMIS (reviewed every 90 days): 4    Referral / Collaboration:  Referral to another professional/service is not indicated at this time..    Anticipated number of session for this episode of care: 4-6  Anticipation frequency of session: Every other week  Anticipated Duration of each session: 38-52 minutes  Treatment plan will be reviewed in 90 days or when goals have been changed.       MeasurableTreatment Goal(s) related to diagnosis / functional  "impairment(s)  Goal 1: Patient will experience a reduction in depressive symptoms along with a corresponding increase in positive emotion and life satisfaction.      Objective #A (Patient Action)    Patient will Increase interest, engagement, and pleasure in doing things.  Status: Continued - Date(s): 1/19/2023    Intervention(s)  Therapist will help patient identify pleasant and mastery oriented events that elicit positive, relaxed mood.    Objective #B  Patient will Decrease frequency and intensity of feeling down, depressed, hopeless.  Status: Continued - Date(s): 1/19/2023    Intervention(s)  Therapist will introduce patient to cognitive-behavioral and acceptance and commitment therapy topics aimed to help reduce depression and anxiety    Objective #C  Patient will Identify negative self-talk and behaviors: challenge core beliefs, myths, and actions  Improve concentration, focus, and mindfulness in daily activities .  Status: Continued - Date(s): COMPLETE    Intervention(s)  Therapist will help patient identify and manage negative self-talk and automatic thoughts; introduce patient to cognitive distortions; help patient develop cognitive diffusion techniques      Goal 2: Patient will experience a reduction in anxious symptoms, along with a corresponding increase in relaxed emotional states and life satisfaction.      Objective #A (Patient Action)  Patient will use cognitive-behavioral and thought diffusion strategies identified in therapy to challenge anxious thoughts.    Status: Continued - Date(s): COMPLETE    Intervention(s)  Therapist will utilize CBT and ACT ideas to help patient challenge anxious thoughts and reduce intensity/duration of anxious distress    Objective #B  Patient will use \"worry time\" each day for 15 minutes of scheduled worry and then defer obsessive or anxious thinking until the next structured worry time.    Status: Continued - Date(s): COMPLETE    Intervention(s)  Therapist will teach " patient how to effectively utilize worry time and/or thought logs/journals each day and incorporate more relaxation behaviors into their routine.    Objective #C  Patient will identify the stressors which contribute to feelings of anxiety  Patient will increase engagement in adaptive coping skills and recreational activities, such as exercise and healthy socialization, to manage distress.  Status: Continued - Date(s): COMPLETE    Intervention(s)  Therapist will help patient identify triggers/situational factors that contribute to anxiety and behavioral skills aimed to manage anxious distress.      Goal 3: Patiient will work toward adaptively managing bipolar-related depression and manic episodes      Objective #A (Patient Action)    Status: Continued - Date(s): COMPLETE    Patient will identify 2-3 signs or signals of emerging mood instability.    Intervention(s)  Therapist will provide educational materials on bipolar disorder and help identifying triggers for mood instability.    Objective #B  Patient will identify 2-3 strategies for more effectively managing Bipolar Disorder.    Status: Continued - Date(s): COMPLETE    Intervention(s)  Therapist will teach emotional recognition/identification. Skills aimed to effectively manage bipolar disorder.      Other Possible Therapeutic Intervention(s):    Psycho-education regarding mental health diagnoses and treatment options    Supportive Therapy    Provide affirmations, reflections, and establish working rapport    Emphasize and reflect on strength of therapeutic relationship    Skills training    Explore skills useful to client in current situation.    Skills include assertiveness, communication, conflict management, problem-solving, relaxation, etc.    Solution-Focused Therapy    Explore patterns in patient's relationships and discuss options for new behaviors.    Explore patterns in patient's actions and choices and discuss options for new  behaviors.    Cognitive-behavioral Therapy    Discuss common cognitive distortions, identify them in patient's life.    Explore ways to challenge, replace, and act against these cognitions.    Acceptance and Commitment Therapy    Explore and identify important values in patient's life.    Discuss ways to commit to behavioral activation around these values.    Psychodynamic psychotherapy    Discuss patient's emotional dynamics and issues and how they impact behaviors.    Explore patient's history of relationships and how they impact present behaviors.    Explore how to work with and make changes in these schemas and patterns.    Narrative Therapy    Explore the patient's story of his/her life from his/her perspective.    Explore alternate ways of understanding their experience, identifying exceptions, developing new themes.    Interpersonal Psychotherapy    Explore patterns in relationships that are effective or ineffective at helping patient reach their goals, find satisfying experience.    Discuss new patterns or behaviors to engage in for improved social functioning.    Behavioral Activation    Discuss steps patient can take to become more involved in meaningful activity.    Identify barriers to these activities and explore possible solutions.    Mindfulness-Based Strategies    Discuss skills based on development and application of mindfulness.    Skills drawn from compassion-focused therapy, dialectical behavior therapy, mindfulness-based stress reduction, mindfulness-based cognitive therapy, etc.      Patient has reviewed and agreed to the above plan.      Joseph Murillo Psy.D, LP   Behavioral Health Clinician   -[facility]     1/19/2023  Answers for HPI/ROS submitted by the patient on 2/28/2023  If you checked off any problems, how difficult have these problems made it for you to do your work, take care of things at home, or get along with other people?: Very difficult  PHQ9  TOTAL SCORE: 6

## 2023-03-02 ENCOUNTER — OFFICE VISIT (OUTPATIENT)
Dept: OPHTHALMOLOGY | Facility: CLINIC | Age: 58
End: 2023-03-02
Attending: OPHTHALMOLOGY
Payer: MEDICARE

## 2023-03-02 DIAGNOSIS — E11.3593 PROLIFERATIVE DIABETIC RETINOPATHY OF BOTH EYES ASSOCIATED WITH TYPE 2 DIABETES MELLITUS, UNSPECIFIED PROLIFERATIVE RETINOPATHY TYPE (H): Primary | ICD-10-CM

## 2023-03-02 DIAGNOSIS — E11.3313 TYPE 2 DIABETES MELLITUS WITH MODERATE NONPROLIFERATIVE RETINOPATHY OF BOTH EYES AND MACULAR EDEMA, UNSPECIFIED WHETHER LONG TERM INSULIN USE (H): ICD-10-CM

## 2023-03-02 PROCEDURE — G0463 HOSPITAL OUTPT CLINIC VISIT: HCPCS | Mod: 25

## 2023-03-02 PROCEDURE — 92015 DETERMINE REFRACTIVE STATE: CPT | Mod: GY

## 2023-03-02 PROCEDURE — 250N000011 HC RX IP 250 OP 636: Performed by: STUDENT IN AN ORGANIZED HEALTH CARE EDUCATION/TRAINING PROGRAM

## 2023-03-02 PROCEDURE — 67028 INJECTION EYE DRUG: CPT | Mod: RT | Performed by: OPHTHALMOLOGY

## 2023-03-02 PROCEDURE — 92134 CPTRZ OPH DX IMG PST SGM RTA: CPT | Performed by: OPHTHALMOLOGY

## 2023-03-02 PROCEDURE — 92012 INTRM OPH EXAM EST PATIENT: CPT | Mod: 24 | Performed by: OPHTHALMOLOGY

## 2023-03-02 RX ADMIN — AFLIBERCEPT 2 MG: 40 INJECTION, SOLUTION INTRAVITREAL at 10:45

## 2023-03-02 ASSESSMENT — CONF VISUAL FIELD
OD_NORMAL: 1
OD_INFERIOR_TEMPORAL_RESTRICTION: 0
OD_INFERIOR_NASAL_RESTRICTION: 0
OS_INFERIOR_TEMPORAL_RESTRICTION: 0
OS_SUPERIOR_TEMPORAL_RESTRICTION: 0
OD_SUPERIOR_NASAL_RESTRICTION: 0
OD_SUPERIOR_TEMPORAL_RESTRICTION: 0
OS_NORMAL: 1
METHOD: COUNTING FINGERS
OS_SUPERIOR_NASAL_RESTRICTION: 0
OS_INFERIOR_NASAL_RESTRICTION: 0

## 2023-03-02 ASSESSMENT — SLIT LAMP EXAM - LIDS
COMMENTS: SMALL PAPILLOMA ALONG LL MARGIN, NON CONCERNING
COMMENTS: NORMAL

## 2023-03-02 ASSESSMENT — VISUAL ACUITY
OS_SC: 20/25
METHOD: SNELLEN - LINEAR
OD_SC+: -2
OD_SC: 20/25
OS_SC+: +2

## 2023-03-02 ASSESSMENT — CUP TO DISC RATIO
OS_RATIO: 0.4
OD_RATIO: 0.3

## 2023-03-02 ASSESSMENT — TONOMETRY
OS_IOP_MMHG: 16
IOP_METHOD: TONOPEN
OD_IOP_MMHG: 14

## 2023-03-02 ASSESSMENT — REFRACTION_MANIFEST
OD_SPHERE: -0.50
OS_SPHERE: -0.50
OD_AXIS: 180
OD_ADD: +2.75
OS_AXIS: 080
OS_CYLINDER: +0.25
OS_ADD: +2.75
OD_CYLINDER: +0.50

## 2023-03-02 ASSESSMENT — EXTERNAL EXAM - RIGHT EYE: OD_EXAM: NORMAL

## 2023-03-02 ASSESSMENT — EXTERNAL EXAM - LEFT EYE: OS_EXAM: PERIOCULAR ECCHYMOSIS

## 2023-03-02 NOTE — PROGRESS NOTES
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 Patient on insulin.    Interval History: s/p CEIOL left eye 1/3/22    Retinal Imaging:  OCT 03/02/23  RE: central Diabetic macular edema, worsening, VMA present, ok foveal contour, - better  LE: improved Diabetic macular edema, mild Epiretinal membrane, normal foveal contour with trace IRF - largely stable    fluorescein angiography 09/28/22  limited by oral dye. No obvious NVE, but peripheral leakage and capillary non perfusion; neovascularization elsewhere left eye probably not seen because of  Vitreous hemorrhage and oral fluorescein    Optos consistent with clinical exam    Assessment & Plan:    # status post Cataract extraction intraocular lens right eye   -IOP WNL today  Retina detachment and endophthalmitis precautions were discussed with the patient (increased blurry vision, drainage, new flashes, floaters or a curtain in the visual field) and was asked to return if any of the those occur    # Pseudophakia, left eye  - s/p CEIOL left eye 1/3/23 by Dr. Martin  - intraocular lens in good position  - vision excellent today (20/20)  - IOP 19    #T2DM   - HbA1c 6.0% (1/2022)  - Blood pressure (<120/80) and blood glucose (HbA1c <7.0, ~6.5 today) control discussed with patient. Patient advised that failure to adequately control each may lead to vision loss. The patient expressed understanding.    # Diabetic macular edema both eyes   - status post avastin in both eyes; Switch to Eylea 4/24/19 with  improvement of Diabetic macular edema   - mild DME each eye (right eye slightly worse, left eye improved with injections)   - watching closely right eye   - last Eylea left eye 12.21.22 (10w prior)  Patient prefers T&E eylea left eye    -Inject Avastin OD today for worsening edema 03/02/23  -Monitor OS as no significant worsening fluid 03/02/23    F/u in 4 weeks    # Proliferative diabetic retinopathy both eyes   # pre-proliferative diabetic retinopathy right eye    # new vitreous hemorrhage left eye 10/12/22   Status post  Eylea inj left eye   Status post  Panretinal laser photocoagulation (PRP) 9.28.22 left eye and Status post scatter PRP right eye 11/02/22     # Dry eye syndrome   Left eye worse than right eye   warm compresses and artificial tears  As needed  - punctal plugs previously left eye - reports this is better     # Status post liver transplant  - tx 11/2019  - severe anemia; s/p transfusion - anemia much improved   - being seen by his primary team    PLAN  Plan for eylea right eye 03/02/23 and return in 4 weeks  Will monitor left eye  Follow up in 4 weeks with Optical Coherence Tomography, fluorescein angiography transits right eye and possible inj     Juan Angeles MD MPH  Vitreoretinal Fellow PGY-5  [facility]     ~~~~~~~~~~~~~~~~~~~~~~~~~~~~~~~~~~   Complete documentation of historical and exam elements from today's encounter can be found in the full encounter summary report (not reduplicated in this progress note).  I personally obtained the chief complaint(s) and history of present illness.  I confirmed and edited as necessary the review of systems, past medical/surgical history, family history, social history, and examination findings as documented by others; and I examined the patient myself.  I personally reviewed the relevant tests, images, and reports as documented above.  I personally reviewed the ophthalmic test(s) associated with this encounter, agree with the  interpretation(s) as documented by the resident/fellow, and have edited the corresponding report(s) as necessary.   I formulated and edited as necessary the assessment and plan and discussed the findings and management plan with the patient and family and No resident or fellow assisted with the procedures performed.  I performed the procedures myself.    Enriqueta Martin MD   of Ophthalmology.  Retina Service   Department of Ophthalmology and Visual Neurosciences   [facility]  Phone: [phone]   Fax: [phone]

## 2023-03-02 NOTE — NURSING NOTE
Chief Complaints and History of Present Illnesses   Patient presents with     Diabetic Retinopathy Follow Up     Chief Complaint(s) and History of Present Illness(es)     Diabetic Retinopathy Follow Up            Laterality: both eyes    Onset: gradual    Onset: months ago    Quality: States the dist va is good    Associated symptoms: photophobia.  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 and macular edema, unspecified whether long term insulin use    this am  Lab Results       Component                Value               Date                       A1C                      6.9                 12/14/2022                 A1C                      6.0                 01/10/2022                 A1C                      6.8                 07/13/2021                 A1C                      6.0                 12/30/2020                 A1C                      6.3                 06/13/2020                 A1C                      6.6                 08/08/2018                 A1C                      6.5                 06/09/2017                 A1C                      7.8                 10/25/2016             Shawanda Noriega COT 8:51 AM March 2, 2023

## 2023-03-06 PROBLEM — E11.3593: Status: ACTIVE | Noted: 2023-03-06

## 2023-03-07 ENCOUNTER — TELEPHONE (OUTPATIENT)
Dept: OPHTHALMOLOGY | Facility: CLINIC | Age: 58
End: 2023-03-07
Payer: MEDICARE

## 2023-03-07 DIAGNOSIS — N18.32 STAGE 3B CHRONIC KIDNEY DISEASE (H): Primary | ICD-10-CM

## 2023-03-07 DIAGNOSIS — Z94.4 LIVER TRANSPLANT RECIPIENT (H): Primary | ICD-10-CM

## 2023-03-09 ENCOUNTER — OFFICE VISIT (OUTPATIENT)
Dept: ENDOCRINOLOGY | Facility: CLINIC | Age: 58
End: 2023-03-09
Payer: MEDICARE

## 2023-03-09 DIAGNOSIS — E11.22 TYPE 2 DIABETES MELLITUS WITH STAGE 3A CHRONIC KIDNEY DISEASE, WITH LONG-TERM CURRENT USE OF INSULIN (H): ICD-10-CM

## 2023-03-09 DIAGNOSIS — Z94.4 LIVER TRANSPLANT RECIPIENT (H): ICD-10-CM

## 2023-03-09 DIAGNOSIS — L60.2 ONYCHAUXIS: ICD-10-CM

## 2023-03-09 DIAGNOSIS — M21.969 TYPE 2 DIABETES MELLITUS WITH DIABETIC FOOT DEFORMITY (H): ICD-10-CM

## 2023-03-09 DIAGNOSIS — Z79.4 TYPE 2 DIABETES MELLITUS WITH STAGE 3A CHRONIC KIDNEY DISEASE, WITH LONG-TERM CURRENT USE OF INSULIN (H): ICD-10-CM

## 2023-03-09 DIAGNOSIS — E11.69 TYPE 2 DIABETES MELLITUS WITH DIABETIC FOOT DEFORMITY (H): ICD-10-CM

## 2023-03-09 DIAGNOSIS — E11.49 TYPE II OR UNSPECIFIED TYPE DIABETES MELLITUS WITH NEUROLOGICAL MANIFESTATIONS, NOT STATED AS UNCONTROLLED(250.60) (H): Primary | ICD-10-CM

## 2023-03-09 DIAGNOSIS — N18.31 TYPE 2 DIABETES MELLITUS WITH STAGE 3A CHRONIC KIDNEY DISEASE, WITH LONG-TERM CURRENT USE OF INSULIN (H): ICD-10-CM

## 2023-03-09 PROCEDURE — 99214 OFFICE O/P EST MOD 30 MIN: CPT | Mod: 25 | Performed by: PODIATRIST

## 2023-03-09 PROCEDURE — 11719 TRIM NAIL(S) ANY NUMBER: CPT | Performed by: PODIATRIST

## 2023-03-09 NOTE — LETTER
3/9/2023       RE: Frandy Workman  [address] [facility] [address]     Dear Colleague,    Thank you for referring your patient, Frandy Workman, to the [facility] ENDOCRINOLOGY CLINIC [place] [facility]. Please see a copy of my visit note below.    Past Medical History:   Diagnosis Date     Anemia 2013     Arthritis      BPH (benign prostatic hyperplasia)      CAD (coronary artery disease) 4/1/2019     Cholelithiasis      Conductive hearing loss 08/16/2017     Depressive disorder 1986    Suffer effects throughout life     Gastroesophageal reflux disease 12/01/2014     HCC (hepatocellular carcinoma) (H) 1/22/2019     History of diabetic retinopathy 07/2018     HTN (hypertension)      HTN (hypertension) 11/20/2019     Hyperlipidemia      Liver cirrhosis secondary to ESTRADA (H)      Liver transplanted (H) 11/11/2019     Portal vein thrombosis 4/11/2019     Type II diabetes mellitus (H)      Patient Active Problem List   Diagnosis     Bipolar affective disorder in remission (H)     Esophageal varices determined by endoscopy (H)     Brow ptosis     Paralytic lagophthalmos of right upper eyelid     Erectile dysfunction due to diseases classified elsewhere     HCC (hepatocellular carcinoma) (H)     Equivocal stress echocardiogram     Type 2 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, unspecified whether long term insulin use (H)     Status post coronary angiogram     CAD (coronary artery disease)     Liver transplant recipient (H)     Status post liver transplantation (H)     Immunosuppressed status (H)     Benign essential hypertension     Malnutrition related to chronic disease (H)     Hypophosphatasia     Chronic kidney disease, stage 3a (H)     Malnutrition (H)     Anxiety     Mild recurrent major depression (H)     Anemia of chronic renal failure, stage 3a (H)     Rekha (H)     History of coronary  artery disease     Dyslipidemia     Constipation, unspecified constipation type     Excessive sweating     Stage 3b chronic kidney disease (H)     Anemia of chronic renal failure, stage 3b (H)     NSTEMI (non-ST elevated myocardial infarction) (H)     Chest pain, unspecified type     Age-related nuclear cataract of left eye     Hypertensive chronic kidney disease with stage 5 chronic kidney disease or end stage renal disease (H)     Vitreous hemorrhage of left eye (H)     Proliferative diabetic retinopathy of both eyes associated with type 2 diabetes mellitus, unspecified proliferative retinopathy type (H)     Past Surgical History:   Procedure Laterality Date     BYPASS GRAFT ARTERY CORONARY N/A 7/14/2021    Procedure: median sternotomy, on cardiopulmonary bypass, CORONARY ARTERY BYPASS GRAFT (CABG) x2 with left greater saphenous vein endoscopic harvest and left internal mammery artery harvest;  Surgeon: Tom Zapata MD;  Location: [facility]     COLONOSCOPY      2015     COLONOSCOPY N/A 12/6/2019    Procedure: COLONOSCOPY, WITH POLYPECTOMY AND BIOPSY;  Surgeon: Adam Morton MD;  Location:  GI     CV CENTRAL VENOUS CATHETER PLACEMENT N/A 7/12/2021    Procedure: Central Venous Catheter Placement;  Surgeon: Fermin Polanco MD;  Location: [facility] CARDIAC CATH LAB     CV CORONARY ANGIOGRAM N/A 7/12/2021    Procedure: Coronary Angiogram;  Surgeon: Fermin Polanco MD;  Location: [facility] CARDIAC CATH LAB     CV HEART CATHETERIZATION WITH POSSIBLE INTERVENTION N/A 2/26/2019    Procedure: CORS;  Surgeon: Jagdish Hoyt MD;  Location: [facility] CARDIAC CATH LAB     CV INTRA AORTIC BALLOON N/A 7/12/2021    Procedure: Intra Aortic Balloon Pump Insertion;  Surgeon: Fermin Polanco MD;  Location: [facility] CARDIAC CATH LAB     ESOPHAGOSCOPY, GASTROSCOPY, DUODENOSCOPY (EGD), COMBINED N/A 11/17/2016    Procedure: COMBINED ESOPHAGOSCOPY, GASTROSCOPY, DUODENOSCOPY  (EGD);  Surgeon: Santi Rosas MD;  [facility]     ESOPHAGOSCOPY, GASTROSCOPY, DUODENOSCOPY (EGD), COMBINED N/A 11/17/2017    Procedure: COMBINED ESOPHAGOSCOPY, GASTROSCOPY, DUODENOSCOPY (EGD);  EGD;  Surgeon: Santi Rosas MD;  Location:  GI     ESOPHAGOSCOPY, GASTROSCOPY, DUODENOSCOPY (EGD), COMBINED N/A 12/28/2018    Procedure: EGD;  Surgeon: Santi Rosas MD;  [facility]     ESOPHAGOSCOPY, GASTROSCOPY, DUODENOSCOPY (EGD), COMBINED N/A 12/6/2019    Procedure: ESOPHAGOGASTRODUODENOSCOPY, WITH BIOPSY;  Surgeon: Adam Morton MD;  Location:  GI     ESOPHAGOSCOPY, GASTROSCOPY, DUODENOSCOPY (EGD), COMBINED N/A 2/13/2020    Procedure: ESOPHAGOGASTRODUODENOSCOPY (EGD);  Surgeon: Santi Rosas MD;  Location:  GI     HEAD & NECK SURGERY      12/2017 at [facility].      IMPLANT GOLD WEIGHT EYELID Right 11/16/2017    Procedure: IMPLANT WEIGHT EYELID;  Right Upper Eyelid Weight, right tarsal strip lower eyelid;  Surgeon: Milana Malave MD;  [facility]     IR CHEMO EMBOLIZATION  1/22/2019     KNEE SURGERY Left      ORTHOPEDIC SURGERY       PAROTIDECTOMY, RADICAL NECK DISSECTION Right 11/2/2017    Procedure: PAROTIDECTOMY, RADICAL NECK DISSECTION;  Right Superfacial Parotidectomy , Facial nerve repair. with [facility] facial nerve monitor.;  Surgeon: Asiya Morgan MD;  [facility]     PHACOEMULSIFICATION CLEAR CORNEA WITH STANDARD INTRAOCULAR LENS IMPLANT Right 1/24/2023    Procedure: PHACOEMULSIFICATION, COMPLEX CATARACT, WITH INTRAOCULAR LENS IMPLANT WITH TRYPAN RIGHT EYE;  Surgeon: Enriqueta Martin MD;  Location: [facility] OR     PHACOEMULSIFICATION WITH STANDARD INTRAOCULAR LENS IMPLANT Left 1/3/2023    Procedure: PHACOEMULSIFICATION, COMPLEX CATARACT, WITH STANDARD INTRAOCULAR LENS IMPLANT INSERTION LEFT EYE;  Surgeon: Enriqueta Martin MD;  Location: [facility] OR     PICC INSERTION Left 11/06/2017    4fr SL BioFlo PICC, 44cm in the L basilic vein w/  tip in the low SVC     RETURN LIVER TRANSPLANT N/A 2019    Procedure: Exploratory laparotomy, hematoma evacuation, abdominal washout;  Surgeon: Александр Ramos MD;  [facility]     TRANSPLANT LIVER RECIPIENT  DONOR N/A 2019    Procedure: TRANSPLANT, LIVER, RECIPIENT,  DONOR;  Surgeon: Александр Ramos MD;  Location: [facility] OR     VASCULAR SURGERY       Social History     Socioeconomic History     Marital status:      Spouse name: Not on file     Number of children: Not on file     Years of education: Not on file     Highest education level: Bachelor's degree (e.g., BA, AB, BS)   Occupational History     Not on file   Tobacco Use     Smoking status: Former     Packs/day: 6.00     Years: 30.00     Pack years: 180.00     Types: Cigars, Cigarettes     Start date: 2016     Quit date: 10/25/2017     Years since quittin.3     Smokeless tobacco: Former     Types: Chew     Quit date: 10/31/2017     Tobacco comments:     1 tin per week   Substance and Sexual Activity     Alcohol use: No     Alcohol/week: 0.0 standard drinks     Comment: quit 1996     Drug use: No     Sexual activity: Not Currently     Partners: Female     Birth control/protection: Condom   Other Topics Concern     Parent/sibling w/ CABG, MI or angioplasty before 65F 55M? Yes   Social History Narrative    Prior [facility]     Social Determinants of Health     Financial Resource Strain: Not on file   Food Insecurity: Not on file   Transportation Needs: Not on file   Physical Activity: Not on file   Stress: Not on file   Social Connections: Not on file   Intimate Partner Violence: Not At Risk     Fear of Current or Ex-Partner: No     Emotionally Abused: No     Physically Abused: No     Sexually Abused: No   Housing Stability: Not on file     Family History   Problem Relation Age of Onset     Skin Cancer Mother      Cancer Mother      Diabetes Mother          3/2016     Cerebrovascular Disease  Mother         Passed away in Feb of this year, 80 years old.     Thyroid Disease Mother      Depression Mother      Colon Cancer Father 60     Pancreatic Cancer Father 60     Prostate Cancer Father      Colorectal Cancer Father      Macular Degeneration Father      Cancer Father      Glaucoma Father      Skin Cancer Father      Asthma Sister         Had since birth     Thyroid Disease Sister      Depression Sister      Colorectal Cancer Maternal Grandmother      Cancer Maternal Grandmother      Substance Abuse Maternal Grandmother         Alcohol     Prostate Cancer Maternal Grandfather      Substance Abuse Maternal Grandfather         Alcohol     Colorectal Cancer Paternal Grandmother      Liver Disease No family hx of      Melanoma No family hx of      Anesthesia Reaction No family hx of      Deep Vein Thrombosis (DVT) No family hx of      Lab Results   Component Value Date    A1C 6.9 12/14/2022    A1C 6.0 01/10/2022    A1C 6.8 07/13/2021    A1C 6.0 12/30/2020    A1C 6.3 06/13/2020    A1C 6.6 08/08/2018    A1C 6.5 06/09/2017    A1C 7.8 10/25/2016     Creatinine   Date Value Ref Range Status   12/14/2022 1.69 (H) 0.67 - 1.17 mg/dL Final   06/28/2021 1.62 (H) 0.66 - 1.25 mg/dL Final     Creatinine POCT   Date Value Ref Range Status   12/14/2022 1.9 (H) 0.7 - 1.3 mg/dL Final       Answers for HPI/ROS submitted by the patient on 3/7/2023  General Symptoms: No  Skin Symptoms: No  HENT Symptoms: No  EYE SYMPTOMS: No  HEART SYMPTOMS: No  LUNG SYMPTOMS: No  INTESTINAL SYMPTOMS: No  URINARY SYMPTOMS: No  REPRODUCTIVE SYMPTOMS: No  SKELETAL SYMPTOMS: No  BLOOD SYMPTOMS: No  NERVOUS SYSTEM SYMPTOMS: No  MENTAL HEALTH SYMPTOMS: No      SUBJECTIVE FINDINGS:  A 58-year-old male returns to clinic for Diabetic foot cares.  He relates he is doing okay.  Relates he has numbness, tingling and neuropathy in his feet and he feels it is worsenting.  Relates no ulcers or sores since we have seen him last.  Relates he needs his toenails  trimmed.  No other specific relieving or aggravating factors.  He relates he does have diabetic shoes that he wears.       OBJECTIVE FINDINGS:  DP and PT 2/4 bilaterally.  He has dorsally contracted digits 2-5 bilaterally.  He has incurvated nails with minimal thickening and dystrophy 1-5 bilaterally to differing degrees.  There is no erythema, no drainage, no odor, no calor bilaterally.  Sharp/dull is intact with 5.07 [place]Daya monofilament bilaterally.  Proprioception is intact bilaterally.  Deep tendon reflexes are intact bilaterally.  There are no gross tendon voids bilaterally.  He has a laterally deviated hallux bilaterally.       ASSESSMENT/PLAN:  Onychauxis bilaterally.  He is Diabetic with peripheral Neuropathy and foot deformity.  His protective sensation is intact.  Diagnosis and treatment options discussed with him.  All the nails were reduced bilaterally upon consent.  Continue Diabetic shoes.  He will return to clinic and see me in about 3 months.  Previous notes reviewed.       Moderate level of medical decision making.      Again, thank you for allowing me to participate in the care of your patient.      Sincerely,    Lamin Gonzalez DPM

## 2023-03-09 NOTE — PROGRESS NOTES
Past Medical History:   Diagnosis Date     Anemia 2013     Arthritis      BPH (benign prostatic hyperplasia)      CAD (coronary artery disease) 4/1/2019     Cholelithiasis      Conductive hearing loss 08/16/2017     Depressive disorder 1986    Suffer effects throughout life     Gastroesophageal reflux disease 12/01/2014     HCC (hepatocellular carcinoma) (H) 1/22/2019     History of diabetic retinopathy 07/2018     HTN (hypertension)      HTN (hypertension) 11/20/2019     Hyperlipidemia      Liver cirrhosis secondary to ESTRADA (H)      Liver transplanted (H) 11/11/2019     Portal vein thrombosis 4/11/2019     Type II diabetes mellitus (H)      Patient Active Problem List   Diagnosis     Bipolar affective disorder in remission (H)     Esophageal varices determined by endoscopy (H)     Brow ptosis     Paralytic lagophthalmos of right upper eyelid     Erectile dysfunction due to diseases classified elsewhere     HCC (hepatocellular carcinoma) (H)     Equivocal stress echocardiogram     Type 2 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, unspecified whether long term insulin use (H)     Status post coronary angiogram     CAD (coronary artery disease)     Liver transplant recipient (H)     Status post liver transplantation (H)     Immunosuppressed status (H)     Benign essential hypertension     Malnutrition related to chronic disease (H)     Hypophosphatasia     Chronic kidney disease, stage 3a (H)     Malnutrition (H)     Anxiety     Mild recurrent major depression (H)     Anemia of chronic renal failure, stage 3a (H)     Rekha (H)     History of coronary artery disease     Dyslipidemia     Constipation, unspecified constipation type     Excessive sweating     Stage 3b chronic kidney disease (H)     Anemia of chronic renal failure, stage 3b (H)     NSTEMI (non-ST elevated myocardial infarction) (H)     Chest pain, unspecified type     Age-related nuclear cataract of left  eye     Hypertensive chronic kidney disease with stage 5 chronic kidney disease or end stage renal disease (H)     Vitreous hemorrhage of left eye (H)     Proliferative diabetic retinopathy of both eyes associated with type 2 diabetes mellitus, unspecified proliferative retinopathy type (H)     Past Surgical History:   Procedure Laterality Date     BYPASS GRAFT ARTERY CORONARY N/A 7/14/2021    Procedure: median sternotomy, on cardiopulmonary bypass, CORONARY ARTERY BYPASS GRAFT (CABG) x2 with left greater saphenous vein endoscopic harvest and left internal mammery artery harvest;  Surgeon: Tom Zapata MD;  Location: [facility] OR     COLONOSCOPY      2015     COLONOSCOPY N/A 12/6/2019    Procedure: COLONOSCOPY, WITH POLYPECTOMY AND BIOPSY;  Surgeon: Adam Morton MD;  [facility]     CV CENTRAL VENOUS CATHETER PLACEMENT N/A 7/12/2021    Procedure: Central Venous Catheter Placement;  Surgeon: Fermin Polanco MD;  Location:  HEART CARDIAC CATH LAB     CV CORONARY ANGIOGRAM N/A 7/12/2021    Procedure: Coronary Angiogram;  Surgeon: Fermin Polanco MD;  Location:  HEART CARDIAC CATH LAB     CV HEART CATHETERIZATION WITH POSSIBLE INTERVENTION N/A 2/26/2019    Procedure: CORS;  Surgeon: Jagdish Hoyt MD;  Location:  HEART CARDIAC CATH LAB     CV INTRA AORTIC BALLOON N/A 7/12/2021    Procedure: Intra Aortic Balloon Pump Insertion;  Surgeon: Fermin Polanco MD;  [facility]: [facility] CARDIAC CATH LAB     ESOPHAGOSCOPY, GASTROSCOPY, DUODENOSCOPY (EGD), COMBINED N/A 11/17/2016    Procedure: COMBINED ESOPHAGOSCOPY, GASTROSCOPY, DUODENOSCOPY (EGD);  Surgeon: Santi Rosas MD;  Location:  GI     ESOPHAGOSCOPY, GASTROSCOPY, DUODENOSCOPY (EGD), COMBINED N/A 11/17/2017    Procedure: COMBINED ESOPHAGOSCOPY, GASTROSCOPY, DUODENOSCOPY (EGD);  EGD;  Surgeon: Santi Rosas MD;  Location:  GI     ESOPHAGOSCOPY, GASTROSCOPY, DUODENOSCOPY (EGD),  COMBINED N/A 2018    Procedure: EGD;  Surgeon: Santi Rosas MD;  Location:  OR     ESOPHAGOSCOPY, GASTROSCOPY, DUODENOSCOPY (EGD), COMBINED N/A 2019    Procedure: ESOPHAGOGASTRODUODENOSCOPY, WITH BIOPSY;  Surgeon: Adam Morton MD;  Location:  GI     ESOPHAGOSCOPY, GASTROSCOPY, DUODENOSCOPY (EGD), COMBINED N/A 2020    Procedure: ESOPHAGOGASTRODUODENOSCOPY (EGD);  Surgeon: Santi Rosas MD;  Location:  GI     HEAD & NECK SURGERY      2017 at [facility].      IMPLANT GOLD WEIGHT EYELID Right 2017    Procedure: IMPLANT WEIGHT EYELID;  Right Upper Eyelid Weight, right tarsal strip lower eyelid;  Surgeon: Milana Malave MD;  Location:  OR     IR CHEMO EMBOLIZATION  2019     KNEE SURGERY Left      ORTHOPEDIC SURGERY       PAROTIDECTOMY, RADICAL NECK DISSECTION Right 2017    Procedure: PAROTIDECTOMY, RADICAL NECK DISSECTION;  Right Superfacial Parotidectomy , Facial nerve repair. with [facility] facial nerve monitor.;  Surgeon: Asiya Morgan MD;  [facility]     PHACOEMULSIFICATION CLEAR CORNEA WITH STANDARD INTRAOCULAR LENS IMPLANT Right 2023    Procedure: PHACOEMULSIFICATION, COMPLEX CATARACT, WITH INTRAOCULAR LENS IMPLANT WITH TRYPAN RIGHT EYE;  Surgeon: Enriqueta Martin MD;  Location: [facility] OR     PHACOEMULSIFICATION WITH STANDARD INTRAOCULAR LENS IMPLANT Left 1/3/2023    Procedure: PHACOEMULSIFICATION, COMPLEX CATARACT, WITH STANDARD INTRAOCULAR LENS IMPLANT INSERTION LEFT EYE;  Surgeon: Enriqueta Martin MD;  Location: [facility] OR     PICC INSERTION Left 2017    4fr SL BioFlo PICC, 44cm in the L basilic vein w/ tip in the low SVC     RETURN LIVER TRANSPLANT N/A 2019    Procedure: Exploratory laparotomy, hematoma evacuation, abdominal washout;  Surgeon: Александр Ramos MD;  [facility] OR     TRANSPLANT LIVER RECIPIENT  DONOR N/A 2019    Procedure: TRANSPLANT, LIVER, RECIPIENT,  DONOR;  Surgeon:  Александр Ramos MD;  Location: [facility] OR     VASCULAR SURGERY       Social History     Socioeconomic History     Marital status:      Spouse name: Not on file     Number of children: Not on file     Years of education: Not on file     Highest education level: Bachelor's degree (e.g., BA, AB, BS)   Occupational History     Not on file   Tobacco Use     Smoking status: Former     Packs/day: 6.00     Years: 30.00     Pack years: 180.00     Types: Cigars, Cigarettes     Start date: 2016     Quit date: 10/25/2017     Years since quittin.3     Smokeless tobacco: Former     Types: Chew     Quit date: 10/31/2017     Tobacco comments:     1 tin per week   Substance and Sexual Activity     Alcohol use: No     Alcohol/week: 0.0 standard drinks     Comment: quit 1996     Drug use: No     Sexual activity: Not Currently     Partners: Female     Birth control/protection: Condom   Other Topics Concern     Parent/sibling w/ CABG, MI or angioplasty before 65F 55M? Yes   Social History Narrative    Prior [facility], [facility]     Social Determinants of Health     Financial Resource Strain: Not on file   Food Insecurity: Not on file   Transportation Needs: Not on file   Physical Activity: Not on file   Stress: Not on file   Social Connections: Not on file   Intimate Partner Violence: Not At Risk     Fear of Current or Ex-Partner: No     Emotionally Abused: No     Physically Abused: No     Sexually Abused: No   Housing Stability: Not on file     Family History   Problem Relation Age of Onset     Skin Cancer Mother      Cancer Mother      Diabetes Mother          3/2016     Cerebrovascular Disease Mother         Passed away in Feb of this year, 80 years old.     Thyroid Disease Mother      Depression Mother      Colon Cancer Father 60     Pancreatic Cancer Father 60     Prostate Cancer Father      Colorectal Cancer Father      Macular Degeneration Father      Cancer Father      Glaucoma Father      Skin Cancer  Father      Asthma Sister         Had since birth     Thyroid Disease Sister      Depression Sister      Colorectal Cancer Maternal Grandmother      Cancer Maternal Grandmother      Substance Abuse Maternal Grandmother         Alcohol     Prostate Cancer Maternal Grandfather      Substance Abuse Maternal Grandfather         Alcohol     Colorectal Cancer Paternal Grandmother      Liver Disease No family hx of      Melanoma No family hx of      Anesthesia Reaction No family hx of      Deep Vein Thrombosis (DVT) No family hx of      Lab Results   Component Value Date    A1C 6.9 12/14/2022    A1C 6.0 01/10/2022    A1C 6.8 07/13/2021    A1C 6.0 12/30/2020    A1C 6.3 06/13/2020    A1C 6.6 08/08/2018    A1C 6.5 06/09/2017    A1C 7.8 10/25/2016     Creatinine   Date Value Ref Range Status   12/14/2022 1.69 (H) 0.67 - 1.17 mg/dL Final   06/28/2021 1.62 (H) 0.66 - 1.25 mg/dL Final     Creatinine POCT   Date Value Ref Range Status   12/14/2022 1.9 (H) 0.7 - 1.3 mg/dL Final       Answers for HPI/ROS submitted by the patient on 3/7/2023  General Symptoms: No  Skin Symptoms: No  HENT Symptoms: No  EYE SYMPTOMS: No  HEART SYMPTOMS: No  LUNG SYMPTOMS: No  INTESTINAL SYMPTOMS: No  URINARY SYMPTOMS: No  REPRODUCTIVE SYMPTOMS: No  SKELETAL SYMPTOMS: No  BLOOD SYMPTOMS: No  NERVOUS SYSTEM SYMPTOMS: No  MENTAL HEALTH SYMPTOMS: No      SUBJECTIVE FINDINGS:  A 58-year-old male returns to clinic for Diabetic foot cares.  He relates he is doing okay.  Relates he has numbness, tingling and neuropathy in his feet and he feels it is worsenting.  Relates no ulcers or sores since we have seen him last.  Relates he needs his toenails trimmed.  No other specific relieving or aggravating factors.  He relates he does have diabetic shoes that he wears.       OBJECTIVE FINDINGS:  DP and PT 2/4 bilaterally.  He has dorsally contracted digits 2-5 bilaterally.  He has incurvated nails with minimal thickening and dystrophy 1-5 bilaterally to differing  degrees.  There is no erythema, no drainage, no odor, no calor bilaterally.  Sharp/dull is intact with 5.07 [place]-Daya monofilament bilaterally.  Proprioception is intact bilaterally.  Deep tendon reflexes are intact bilaterally.  There are no gross tendon voids bilaterally.  He has a laterally deviated hallux bilaterally.       ASSESSMENT/PLAN:  Onychauxis bilaterally.  He is Diabetic with peripheral Neuropathy and foot deformity.  His protective sensation is intact.  Diagnosis and treatment options discussed with him.  All the nails were reduced bilaterally upon consent.  Continue Diabetic shoes.  He will return to clinic and see me in about 3 months.  Previous notes reviewed.                   Moderate level of medical decision making.

## 2023-03-10 RX ORDER — CHOLECALCIFEROL (VITAMIN D3) 50 MCG
1 TABLET ORAL DAILY
Qty: 90 TABLET | Refills: 2 | OUTPATIENT
Start: 2023-03-10

## 2023-03-10 RX ORDER — CHOLECALCIFEROL (VITAMIN D3) 50 MCG
1 TABLET ORAL DAILY
Qty: 90 TABLET | Refills: 3 | Status: SHIPPED | OUTPATIENT
Start: 2023-03-10 | End: 2024-03-25

## 2023-03-15 ENCOUNTER — LAB (OUTPATIENT)
Dept: LAB | Facility: CLINIC | Age: 58
End: 2023-03-15
Attending: INTERNAL MEDICINE
Payer: MEDICARE

## 2023-03-15 DIAGNOSIS — N18.32 STAGE 3B CHRONIC KIDNEY DISEASE (H): ICD-10-CM

## 2023-03-15 DIAGNOSIS — Z95.1 S/P CABG (CORONARY ARTERY BYPASS GRAFT): ICD-10-CM

## 2023-03-15 DIAGNOSIS — Z12.5 ENCOUNTER FOR SCREENING FOR MALIGNANT NEOPLASM OF PROSTATE: ICD-10-CM

## 2023-03-15 DIAGNOSIS — Z94.4 LIVER REPLACED BY TRANSPLANT (H): ICD-10-CM

## 2023-03-15 DIAGNOSIS — Z00.00 HEALTH CARE MAINTENANCE: ICD-10-CM

## 2023-03-15 LAB
ALBUMIN MFR UR ELPH: 66 MG/DL (ref 1–14)
ALBUMIN SERPL BCG-MCNC: 4.8 G/DL (ref 3.5–5.2)
ALBUMIN UR-MCNC: 50 MG/DL
ALP SERPL-CCNC: 117 U/L (ref 40–129)
ALT SERPL W P-5'-P-CCNC: 23 U/L (ref 10–50)
ANION GAP SERPL CALCULATED.3IONS-SCNC: 14 MMOL/L (ref 7–15)
APPEARANCE UR: CLEAR
AST SERPL W P-5'-P-CCNC: 18 U/L (ref 10–50)
BILIRUB DIRECT SERPL-MCNC: <0.2 MG/DL (ref 0–0.3)
BILIRUB SERPL-MCNC: 0.4 MG/DL
BILIRUB UR QL STRIP: NEGATIVE
BUN SERPL-MCNC: 33 MG/DL (ref 8–23)
CALCIUM SERPL-MCNC: 10.2 MG/DL (ref 8.6–10)
CHLORIDE SERPL-SCNC: 101 MMOL/L (ref 98–107)
COLOR UR AUTO: ABNORMAL
CREAT SERPL-MCNC: 2.19 MG/DL (ref 0.67–1.17)
CREAT UR-MCNC: 69.6 MG/DL
CREAT UR-MCNC: 71.4 MG/DL
DEPRECATED HCO3 PLAS-SCNC: 26 MMOL/L (ref 22–29)
ERYTHROCYTE [DISTWIDTH] IN BLOOD BY AUTOMATED COUNT: 14 % (ref 10–15)
GFR SERPL CREATININE-BSD FRML MDRD: 34 ML/MIN/1.73M2
GLUCOSE SERPL-MCNC: 260 MG/DL (ref 70–99)
GLUCOSE UR STRIP-MCNC: >=1000 MG/DL
HCT VFR BLD AUTO: 43.2 % (ref 40–53)
HGB BLD-MCNC: 13.9 G/DL (ref 13.3–17.7)
HGB UR QL STRIP: ABNORMAL
KETONES UR STRIP-MCNC: NEGATIVE MG/DL
LEUKOCYTE ESTERASE UR QL STRIP: NEGATIVE
MCH RBC QN AUTO: 31 PG (ref 26.5–33)
MCHC RBC AUTO-ENTMCNC: 32.2 G/DL (ref 31.5–36.5)
MCV RBC AUTO: 96 FL (ref 78–100)
MICROALBUMIN UR-MCNC: 401 MG/L
MICROALBUMIN/CREAT UR: 561.62 MG/G CR (ref 0–17)
MUCOUS THREADS #/AREA URNS LPF: PRESENT /LPF
NITRATE UR QL: NEGATIVE
PH UR STRIP: 5.5 [PH] (ref 5–7)
PHOSPHATE SERPL-MCNC: 3.4 MG/DL (ref 2.5–4.5)
PLATELET # BLD AUTO: 161 10E3/UL (ref 150–450)
POTASSIUM SERPL-SCNC: 3.9 MMOL/L (ref 3.4–5.3)
PROT SERPL-MCNC: 7.7 G/DL (ref 6.4–8.3)
PROT/CREAT 24H UR: 0.95 MG/MG CR (ref 0–0.2)
PSA SERPL DL<=0.01 NG/ML-MCNC: 1.37 NG/ML (ref 0–3.5)
PTH-INTACT SERPL-MCNC: 78 PG/ML (ref 15–65)
RBC # BLD AUTO: 4.48 10E6/UL (ref 4.4–5.9)
RBC URINE: 1 /HPF
SODIUM SERPL-SCNC: 141 MMOL/L (ref 136–145)
SP GR UR STRIP: 1.02 (ref 1–1.03)
SQUAMOUS EPITHELIAL: <1 /HPF
UROBILINOGEN UR STRIP-MCNC: NORMAL MG/DL
WBC # BLD AUTO: 5.6 10E3/UL (ref 4–11)
WBC URINE: <1 /HPF

## 2023-03-15 PROCEDURE — 81001 URINALYSIS AUTO W/SCOPE: CPT | Performed by: PATHOLOGY

## 2023-03-15 PROCEDURE — 82248 BILIRUBIN DIRECT: CPT | Performed by: PATHOLOGY

## 2023-03-15 PROCEDURE — 82570 ASSAY OF URINE CREATININE: CPT | Performed by: INTERNAL MEDICINE

## 2023-03-15 PROCEDURE — 36415 COLL VENOUS BLD VENIPUNCTURE: CPT | Performed by: PATHOLOGY

## 2023-03-15 PROCEDURE — 84100 ASSAY OF PHOSPHORUS: CPT | Performed by: PATHOLOGY

## 2023-03-15 PROCEDURE — 80053 COMPREHEN METABOLIC PANEL: CPT | Performed by: PATHOLOGY

## 2023-03-15 PROCEDURE — G0103 PSA SCREENING: HCPCS | Performed by: PATHOLOGY

## 2023-03-15 PROCEDURE — 85027 COMPLETE CBC AUTOMATED: CPT | Performed by: PATHOLOGY

## 2023-03-15 PROCEDURE — 82306 VITAMIN D 25 HYDROXY: CPT | Performed by: INTERNAL MEDICINE

## 2023-03-15 PROCEDURE — 83970 ASSAY OF PARATHORMONE: CPT | Performed by: PATHOLOGY

## 2023-03-15 PROCEDURE — 84156 ASSAY OF PROTEIN URINE: CPT | Performed by: PATHOLOGY

## 2023-03-15 NOTE — TELEPHONE ENCOUNTER
aspirin (SM ASPIRIN ADULT LOW STRENGTH) 81 MG EC tablet  Last Written Prescription Date:   8/30/2022  Last Fill Quantity: 180,   # refills: 1  Last Office Visit :  2/22/2023  Future Office visit:  None  180 Tabs, 3 Refills sent to pharm       Marsha Chung RN  Central Triage Red Flags/Med Refills

## 2023-03-17 LAB
DEPRECATED CALCIDIOL+CALCIFEROL SERPL-MC: <65 UG/L (ref 20–75)
VITAMIN D2 SERPL-MCNC: <5 UG/L
VITAMIN D3 SERPL-MCNC: 60 UG/L

## 2023-03-20 ENCOUNTER — TELEPHONE (OUTPATIENT)
Dept: TRANSPLANT | Facility: CLINIC | Age: 58
End: 2023-03-20

## 2023-03-20 ENCOUNTER — OFFICE VISIT (OUTPATIENT)
Dept: GASTROENTEROLOGY | Facility: CLINIC | Age: 58
End: 2023-03-20
Attending: INTERNAL MEDICINE
Payer: MEDICARE

## 2023-03-20 ENCOUNTER — OFFICE VISIT (OUTPATIENT)
Dept: NEPHROLOGY | Facility: CLINIC | Age: 58
End: 2023-03-20
Attending: INTERNAL MEDICINE
Payer: MEDICARE

## 2023-03-20 VITALS
DIASTOLIC BLOOD PRESSURE: 83 MMHG | WEIGHT: 206.11 LBS | HEART RATE: 90 BPM | TEMPERATURE: 98.1 F | OXYGEN SATURATION: 98 % | BODY MASS INDEX: 30.42 KG/M2 | SYSTOLIC BLOOD PRESSURE: 166 MMHG

## 2023-03-20 VITALS
SYSTOLIC BLOOD PRESSURE: 166 MMHG | HEART RATE: 90 BPM | WEIGHT: 206.7 LBS | DIASTOLIC BLOOD PRESSURE: 83 MMHG | TEMPERATURE: 98.1 F | OXYGEN SATURATION: 98 % | BODY MASS INDEX: 30.51 KG/M2

## 2023-03-20 DIAGNOSIS — M54.50 RIGHT-SIDED LOW BACK PAIN WITHOUT SCIATICA, UNSPECIFIED CHRONICITY: ICD-10-CM

## 2023-03-20 DIAGNOSIS — M19.90 ARTHRITIS: ICD-10-CM

## 2023-03-20 DIAGNOSIS — Z94.4 LIVER REPLACED BY TRANSPLANT (H): ICD-10-CM

## 2023-03-20 DIAGNOSIS — D84.9 IMMUNOSUPPRESSION (H): Primary | ICD-10-CM

## 2023-03-20 DIAGNOSIS — N18.32 STAGE 3B CHRONIC KIDNEY DISEASE (CKD) (H): Primary | ICD-10-CM

## 2023-03-20 DIAGNOSIS — Z79.620 LONG TERM (CURRENT) USE OF IMMUNOSUPPRESSIVE BIOLOGIC: ICD-10-CM

## 2023-03-20 DIAGNOSIS — Z94.4 LIVER TRANSPLANT RECIPIENT (H): ICD-10-CM

## 2023-03-20 DIAGNOSIS — Z79.52 LONG TERM (CURRENT) USE OF SYSTEMIC STEROIDS: ICD-10-CM

## 2023-03-20 PROCEDURE — G0463 HOSPITAL OUTPT CLINIC VISIT: HCPCS | Mod: 27 | Performed by: INTERNAL MEDICINE

## 2023-03-20 PROCEDURE — 99214 OFFICE O/P EST MOD 30 MIN: CPT | Mod: 24 | Performed by: INTERNAL MEDICINE

## 2023-03-20 PROCEDURE — G0463 HOSPITAL OUTPT CLINIC VISIT: HCPCS | Performed by: INTERNAL MEDICINE

## 2023-03-20 PROCEDURE — 99214 OFFICE O/P EST MOD 30 MIN: CPT | Performed by: INTERNAL MEDICINE

## 2023-03-20 RX ORDER — PANTOPRAZOLE SODIUM 40 MG/1
40 TABLET, DELAYED RELEASE ORAL
Qty: 90 TABLET | Refills: 3 | Status: SHIPPED | OUTPATIENT
Start: 2023-03-20 | End: 2024-03-19

## 2023-03-20 RX ORDER — LISINOPRIL 5 MG/1
5 TABLET ORAL DAILY
Qty: 30 TABLET | Refills: 11 | Status: SHIPPED | OUTPATIENT
Start: 2023-03-20 | End: 2023-05-04

## 2023-03-20 RX ORDER — ACETAMINOPHEN 325 MG/1
325 TABLET ORAL EVERY 6 HOURS PRN
Qty: 90 TABLET | Refills: 3 | Status: SHIPPED | OUTPATIENT
Start: 2023-03-20 | End: 2023-08-10

## 2023-03-20 ASSESSMENT — PAIN SCALES - GENERAL
PAINLEVEL: SEVERE PAIN (6)
PAINLEVEL: SEVERE PAIN (6)

## 2023-03-20 ASSESSMENT — PATIENT HEALTH QUESTIONNAIRE - PHQ9: SUM OF ALL RESPONSES TO PHQ QUESTIONS 1-9: 11

## 2023-03-20 NOTE — PROGRESS NOTES
Nephrology Clinic     DOS:  2023     Name: Frandy Workman  MRN: [id number]  Age: 58 year old  : 1965  Referring provider: Natalie Russell     Assessment and Plan:  1. CKD, stage 3b (A3): Prior to recent liver transplant baseline Cr ~1.1 mg/dL with eGFR of 75 ml/min. Post-transplant creatinine has gradually fluctuated betwen  ~1.6-1.8 mg/dL (eGFR of 40 ml/min) and remains relatively stable though his Cr is more elevated today at ~2.2 mg/dL.  Unclear if this rise in Cr is due to progression of kidney disease or may be more  pre-renal in nature.  Etiology of CKD likely multifactorial and related to some degree of diabetic changes further complicated by chronic changes from past lithium use (for 15 years) and further complicated by tacrolimus toxicity.  He is at risk of progressive CKD due diabetes.  As mentioned, he is now off of tacrolimus and continues on MMF and prednisone for his liver transplant.  He did have worsening albuminuria that significantly improved after restarting lisinopril  and empagliflozin.  However, he reports that he is no longer taking lisinopril.    -restart RAAS blockade with lisinopril at a low dose of 5 mg daily with goal of slowing progeession of CKD and minimizing albuminuria (albumin to Cr ratio ~563 mg/g today).   -continue empagliflozin but at a lower dose of 10 mg daiy with the goal of minimizing albuminuria, slowing CKD progression, and minimizing cardiovascular events given his past CABG and ischemic HFrEF (45-50% by TTE In )  -will consider increasing lisinopril next if albuminuria worsens      -RTC in 6 months     2. HTN/Volume status: Euvolemic to slightly hypervolemic on exam. He has not been measuring his home BP recently.  Clinic BP above goal today of at least <130/80.    -he will continue metoprolol XL at 12.5 mg and restart lisinopril 5 mg daily (RAAS blockade both for cardio and renal protection)  -continue empagliflozin which likely has  led to better blood pressure control as well  -he will measure his BP daily and report to clinic for further adjustments.       3.  Electrolytes:  Potassium on higher side in past.  Suspect multifactorial in nature and due to CKD with underlying type 4 RTA physiology, hyperglycemia and past use of tacrolimus. It did improve after discontinuation of tacrolimus.  -refered to a dietician in past for more education regarding a low potassium diet.   No specific intervention needed at this time.   -as mentioned, we will restart lisinopril today and recheck potassium in 1 week        4. Anemia: Possibly related to Imuran in the past, which has been discontinued.  Iron stores are replete.  Anemia of chronic disease and renal insufficiency also likely contributing.  He did IVELISSE treatment and hemoglobin improved to 13.9 in the past.  Hemoglobin dropped again requiring restarting IVELISSE therapy (and a blood transfusion).  Fortunately, hemoglobin now stable in the 12's.        -he will continue to follow in the anemia management clinic     5.  Fatigue:  Suspect related to multiple comorbidities and deconditioned state.  We did check a TSH that was within normal limits.     -monitor for now    Follow-up: RTC in 6 months     Reason For Visit:   CKD    HPI:   Frandy Workman is a 57 year old man who presents for CKD f/u.  His past medical history is remarkable for liver transplant (November, 2019) for ESTRADA cirrhosis (complicated by ascites and hepatic encephalopathy on lactulose and rifaximin in the past), hepatocellular carcinoma (s/p TACE and microwave ablation 01/2019), long standing DM 2 (complicated by nonproliferative diabetic retinopathy, macular edema and peripheral neuropathy), bipolar disorder (previously on lithium for 15 years), HTN, hyperlipidemia and CAD by angiography not requiring PCI.      He denies excessive use of NSAIDs in the past.  He had no history of nephrolithiasis or frequent UTIs.  He has no significant  family history of CKD.     In terms of CKD, he appeared to have a baseline of ~1.1 mg/dL prior to his liver transplant in November 2019.  Creatinine after transplant was ~1.3 mg/dL at time of discharge and vikram to 3.2 mg/dL thought to be prerenal from volume depletion.  He was admitted for IVF and creatinine improved to 1.5 mg/dL at time of discharge.  His serum Cr now seems to fluctuate around 1.8 mg/dL with an eGFR of 40 ml/min.  He continues on tacrolimus for his liver transplant.  He no longer is on lasix for management of edema.  He has not required IVELISSE therapy in several months (last August, 2020).  He recently was started on low dose carvedilol for management of hypertension.  He also recently started empagliflozin for management of diabetes. He reports weight gain due to lack of exercise during the COVID pandemic.  His main complaint today is that of pain near his incisional scars for his liver transplant for which he is following up with hepatology.  His BP this morning was 129/84.      Interval History:  Overall, Mr. Workman feels well.  In July, 2021 he had unstble angina in the setting of severe anemia and eventually was noted to have a NSTEMI and underwent a CABG.  He was started on lisinopril and metoprolol but lisinopril was discontinued due to hypotension. His empagliflozin was discontinued during his recent hospitalization. Fortunately, we have been able to restart both lisinopril and empagliflozin. Though he reports today that he is not taking lisinopril. He underwent cardiac rehab and has recovered well.  Anemia has been an intermittent issue requiring blood transfusions and IVELISSE therapy.  Fortunately, his hemoglobn has now been stable in the 12's.  His only complaint today continues to be that of fatigue.  Recent issues have been bilateral cateract surgery and Mohs surgery for SCC of the scalp.  He also was recently started on metformin and continues on empagliflozin.  He otherwise, has no major  medical complaints.        Review of Systems:   Pertinent items are noted in HPI or as below, remainder of complete ROS is negative.      Active Medications:   Current Outpatient Medications   Medication     Acetaminophen (TYLENOL) 325 MG CAPS     ammonium lactate (AMLACTIN) 12 % external cream     aspirin (SM ASPIRIN ADULT LOW STRENGTH) 81 MG EC tablet     BD VIKTORIA U/F 32G X 4 MM insulin pen needle     benzoyl peroxide 5 % external liquid     Continuous Blood Gluc  (FREESTYLE CLAUDIA 14 DAY READER) CECILIO     Continuous Blood Gluc Sensor (FREESTYLE CLAUDIA 2 SENSOR) MISC     dorzolamide-timolol (COSOPT) 2-0.5 % ophthalmic solution     empagliflozin (JARDIANCE) 10 MG TABS tablet     insulin aspart (NOVOLOG PEN) 100 UNIT/ML pen     insulin degludec (TRESIBA FLEXTOUCH) 100 UNIT/ML pen     ketoconazole (NIZORAL) 2 % external shampoo     ketorolac (ACULAR) 0.5 % ophthalmic solution     lamiVUDine (EPIVIR) 100 MG tablet     metFORMIN (GLUCOPHAGE XR) 500 MG 24 hr tablet     metoprolol succinate ER (TOPROL XL) 25 MG 24 hr tablet     Multiple Vitamin (TAB-A-GLADIS) TABS     mycophenolate (GENERIC EQUIVALENT) 250 MG capsule     ofloxacin (OCUFLOX) 0.3 % ophthalmic solution     order for DME     pantoprazole (PROTONIX) 40 MG EC tablet     prednisoLONE acetate (PRED FORTE) 1 % ophthalmic suspension     predniSONE (DELTASONE) 5 MG tablet     rosuvastatin (CRESTOR) 5 MG tablet     tamsulosin (FLOMAX) 0.4 MG capsule     Vitamin D3 50 mcg (2000 units) tablet     Current Facility-Administered Medications   Medication     aflibercept (EYLEA) injection prefilled syringe 2 mg     aflibercept (EYLEA) injection prefilled syringe 2 mg     aflibercept (EYLEA) injection prefilled syringe 2 mg        Allergies:   Codeine and Seasonal allergies      Past Medical History:  Chronic kidney disease, stage 3  Type 2 diabetes mellitus  Bipolar affective disorder   Brow ptosis  Hepatocellular carcinoma  Coronary artery disease s/p CABG  Hypertension    Anemia   Anxiety   Mild recurrent major depression  Mild nonproliferative retinopathy  Gastroesophageal reflux disease   Cholelithiasis   Benign prostatic hypertrophy   Portal vein thrombosis      Past Surgical History:  Liver transplant recipient   Coronary catheterization  Parotidectomy, radical neck dissection  Right eyelid weight placement  Orthopedic surgery    Family History:   Prostate cancer - maternal grandfather, father   Substance abuse - maternal grandfather, maternal grandmother   Colon cancer - father, paternal grandmother  Cancer - mother  Thyroid disease - mother, sister   Stroke - mother  Depression - mother, sister  Asthma - sister      Social History:   Presents to clinic alone  Tobacco Use: Former smoker, 180 pack year history, quit 2017.   Alcohol Use: quit September 1996  PCP: JEREMIAS NEGRON      Physical Exam:  BP (!) 166/83   Pulse 90   Temp 98.1  F (36.7  C) (Oral)   Wt 93.5 kg (206 lb 1.8 oz)   SpO2 98%   BMI 30.42 kg/m     GA: NAD  HEENT: no scleral icterus, no cervical LAD  CV: regular, no rub, no JVD, no edema  PULM: Lungs CTA B  GI: abd soft, NT, ND  : no CVA tenderness  MSK: no joint effusions, trace LE edema  SKIN: no concerning rash  NEURO: no gross focal deficit     Laboratory:  CMP  Recent Labs   Lab Test 03/15/23  0906 01/24/23  0652 01/03/23  0651 12/14/22  0920 12/14/22  0847 09/07/22  0827 07/20/22  0953 07/20/22  0953 07/13/22  1424 06/08/22  0834 06/08/22  0755 04/20/22  0912 01/11/22  0905 01/10/22  2142 10/04/21  0802 09/24/21  1000 08/23/21  0945 08/09/21  1022 07/21/21  1057 07/21/21  0617 07/12/21  1036 06/28/21  1347 06/14/21  1322 06/04/21  1236 05/05/21  0909     --   --   --  142 140  --  138 140  --  140 140   < > 135   < > 139   < > 136   < > 136   < > 137 139 138 139   POTASSIUM 3.9  --   --   --  4.0 4.7  --  4.7 6.2*  --  4.6 5.4*   < > 5.8*   < > 4.6   < > 4.4   < > 4.1   < > 4.6 4.7 4.6 4.5   CHLORIDE 101  --   --   --  104 103  --  105 108   --  106 107   < > 106   < > 105   < > 105   < > 104   < > 107 106 106 107   CO2 26  --   --   --  24 27  --  26 25  --  27 27   < > 19*   < > 27   < > 24   < > 23   < > 25 26 26 26   ANIONGAP 14  --   --   --  14 10  --  7 7  --  7 6   < > 10   < > 7   < > 7   < > 9   < > 5 7 6 6   * 181* 187*  --  96 126*   < > 269* 202*  --  105* 130*   < > 195*   < > 140*   < > 283*   < > 170*   < > 208* 173* 156* 258*   BUN 33.0*  --   --   --  32.5* 32.9*  --  32* 37*  --  35* 45*   < > 78*   < > 33*   < > 24   < > 18   < > 33* 29 39* 38*   CR 2.19*  --   --  1.9* 1.69* 1.47*  --  1.85* 1.83*   < > 1.77* 1.88*   < > 2.21*   < > 1.30*   < > 1.50*   < > 1.03   < > 1.62* 1.54* 1.64* 1.52*   GFRESTIMATED 34*  --   --  40* 46* 55*  --  42* 42*   < > 44* 41*   < > 34*   < > 61   < > 51*   < > 80   < > 46* 49* 46* 50*   GFRESTBLACK  --   --   --   --   --   --   --   --   --   --   --   --   --   --   --   --   --   --   --   --   --  54* 57* 53* 58*   DEBORAH 10.2*  --   --   --  10.2* 10.1*  --  9.4 9.9  --  9.0 9.2   < > 10.4*   < > 9.2   < > 9.4   < > 7.1*   < > 9.1 9.8 10.2* 9.6   MAG  --   --   --   --   --   --   --   --   --   --   --   --   --  2.3  --  2.0  --  1.8  --  2.7*   < >  --   --  2.2  --    PHOS 3.4  --   --   --   --  3.4  --  3.4  --   --   --  4.2  --   --    < >  --   --   --   --  3.1   < >  --   --   --   --    PROTTOTAL 7.7  --   --   --  7.5  --   --   --  7.8  --  7.2  --    < > 8.6   < > 7.3   < > 7.2   < > 6.2*   < > 7.4 7.8 7.8 7.8   ALBUMIN 4.8  --   --   --  4.7 4.5  --  4.3 4.3  --  4.1 4.2   < > 3.6   < > 4.0   < > 3.7   < > 2.7*   < > 4.0 4.1 4.2 4.2   BILITOTAL 0.4  --   --   --  0.6  --   --   --  0.6  --  0.4  --    < > 0.4   < > 0.6   < > 0.4   < > 0.5   < > 0.5 0.6 0.5 0.5   ALKPHOS 117  --   --   --  116  --   --   --  104  --  85  --    < > 106   < > 78   < > 118   < > 126   < > 98 106 108 112   AST 18  --   --   --  22  --   --   --  10  --  12  --    < > 25   < > 6   < > 5   < > 12   <  > 9 8 10 13   ALT 23  --   --   --  30  --   --   --  21  --  26  --    < > 19   < > 17   < > 15   < > 17   < > 20 21 26 28    < > = values in this interval not displayed.     CBC  Recent Labs   Lab Test 03/15/23  0906 12/14/22  0847 09/07/22  0827 07/13/22  1424   HGB 13.9 13.5 12.8* 13.0*   WBC 5.6 6.5 4.7 5.7   RBC 4.48 4.41 4.08* 4.13*   HCT 43.2 41.6 40.3 40.4   MCV 96 94 99 98   MCH 31.0 30.6 31.4 31.5   MCHC 32.2 32.5 31.8 32.2   RDW 14.0 13.9 14.6 13.8    160 119* 153     INR  Recent Labs   Lab Test 01/11/22  2200 07/14/21  1351 07/14/21  1215 07/12/21  1608 11/12/19  1645 11/12/19  1400 11/12/19  0950 11/12/19  0346   INR 1.13 1.28* 1.45* 0.96   < > 1.46*   < > 1.47*   PTT  --  46* 37  --   --  39*  --  57*    < > = values in this interval not displayed.     ABG  Recent Labs   Lab Test 01/10/22  2314 07/15/21  0425 07/14/21  2049 07/14/21  1605 07/14/21  1352 07/14/21  1351 07/14/21  1351 07/14/21  1252 07/14/21  1216   PH 7.37  --   --  7.37  --   --  7.29* 7.36 7.38   PCO2  --   --   --  37  --   --  44 35 36   PO2  --   --   --  87  --   --  145* 181* 306*   HCO3  --   --   --  21  --   --  21 20* 21   O2PER  --  21 21 21 5   < > 5 50.0 75.0    < > = values in this interval not displayed.      URINE STUDIES  Recent Labs   Lab Test 03/15/23  0915 01/11/22  0001 09/07/21  1115 12/04/19  1400   COLOR Straw Light Yellow Yellow Light Yellow   APPEARANCE Clear Clear Cloudy* Clear   URINEGLC >=1000* >=1000* 50* 30*   URINEBILI Negative Negative Negative Negative   URINEKETONE Negative Negative Negative Negative   SG 1.023 1.018 1.022 1.009   UBLD Trace* Negative Small* Negative   URINEPH 5.5 5.0 5.0 5.0   PROTEIN 50* Negative >499* 30*   NITRITE Negative Negative Negative Negative   LEUKEST Negative Negative Negative Negative   RBCU 1 <1 1 0   WBCU <1 1 1 1     Recent Labs   Lab Test 04/20/22  0919 10/04/21  0804 09/07/21  1115 02/26/21  0750 06/29/20  1008 03/02/20  1013 12/02/19  1040 07/08/19  1017  02/04/19  0705   UTPG 0.12 1.46* 1.17* 0.47* 0.89* 0.29* 1.22* 0.11 0.14     PTH  Recent Labs   Lab Test 03/15/23  0906 04/20/22  0912 10/04/21  0802 06/29/20  1005 07/08/19  1020   PTHI 78* 59 81* 61 54     IRON STUDIES   Recent Labs   Lab Test 04/25/22  0925 02/09/22  0850 11/16/21  1004 11/02/21  1031 07/12/21  1608 06/28/21  1347 06/04/21  1236 12/02/20  0739 11/11/20  0754 10/14/20  0836 09/16/20  0802 07/29/20  0749 06/29/20  1005 05/21/20  0723 04/22/20  0833 03/09/20  0823 01/27/20  1340 12/02/19  1034 12/28/18  1108 09/15/18  1215 06/09/17  1250   IRON 119 96 93 109  --  112 158 75 81 73 93 87 60 72 65 92  --  88  --  52  --     248 211* 241  --  226* 270 228* 227* 248 221* 230* 204* 192* 215* 231*  --  248  --  403  --    IRONSAT 42 39 44 45  --  50* 59* 33 36 29 42 38 29 38 30 40  --  36  --  13*  --    QUAN 508* 1,046* 400* 479* 543* 495* 399* 433* 286 323 223 193 352 344 643* 859* 690* 1,353* 90 10* 450*       All above labs reviewed by me.   Eloy Rao MD   [phone]

## 2023-03-20 NOTE — NURSING NOTE
Chief Complaint   Patient presents with     RECHECK     CK[facility], HEATH, PHQ9 6  MO       BP (!) 166/83   Pulse 90   Temp 98.1  F (36.7  C) (Oral)   Wt 93.5 kg (206 lb 1.8 oz)   SpO2 98%   BMI 30.42 kg/m      PETE Visit Time Tracking  Role: MA/SIRISHA  Type: Time with patient  Time in minutes: Sarbjit Soares on 3/20/2023 at 9:17 AM

## 2023-03-20 NOTE — PROGRESS NOTES
[facility] Hepatology    Follow-up Visit    Follow-up visit for liver transplant    Subjective:  58 year old male    OLT 11/11/19  - ESTRADA/HCC  - mikaela-op MELD= 12  - donor- donor age 63/local/DBD/ECD- hep B core positive/donor CMV(+)/recipient CMV(-)  - operation- CiT 7:39, EBL 2L, piggyback IVC, duct-to-duct biliary anastomosis  - post-op course- perihepatic hematoma; MMF colitis Dec 2019  - immunosuppression- MMF 500mg Q12, pred 5     HCC  - dx Dec 2018  - MRI liver 12/23/18- 3.1cm lesion segment IVB, 1.1cm lesion segment III  - AFP 12/28/18= 5.2  - bone scan 1/4/19  - CT chest 1/4/19  - TACE 1/22/19 (seg IV); microwave ablation 1/23/19 (seg III)  - explant pathology- no viable tumor; moderately differentiated; no vascular invasion  - last MRI liver 9/25/2020, with no evidence of recurrence  - last imaging CT C/A/P Dec 2022  - last AFP Jun 2022= 2.3    Last visit 03/2022.  Patient has since had bilateral cataract surgery.  He also had Mohs surgery for SCC of scalp in Feb 2023.  The patient has been started on metformin for hyperglycemia.  No hospital admissions or ER visits since last visit.    The patient is well overall today.  His main complaint is fatigue for which his primary care doctor is ordering a sleep study.  He also reports some mild right lower flank pain which is worse with movement.    The patient denies jaundice, lower extremity edema or abdominal pain.    The patient denies nausea, vomiting, constipation or diarrhea.    The patient denies any fever, sweats or chills.  He is up to date with his COVID and influenza vaccinations.  He has not yet had shingles vaccination.    Weight is increased to 206 pounds since last visit.  Appetite is good.    The patient's morning blood sugars have been increased to 150-190 resulting in addition of metformin to his antidiabetic medications.    The patient is fairly adherent to his medications.  No issues obtaining his medications through insurance or  pharmacy.    The patient does not drink alcohol.  He continues to live by himself in [facility].    Health maintenance  Skin check Feb 2023  Colonoscopy due 2024  Lipids Dec 2022  Vaccinations up to date  Bone density Feb 2019    Medical hx Surgical hx   Past Medical History:   Diagnosis Date     Anemia 2013     Arthritis      BPH (benign prostatic hyperplasia)      CAD (coronary artery disease) 4/1/2019     Cholelithiasis      Conductive hearing loss 08/16/2017     Depressive disorder 1986    Suffer effects throughout life     Gastroesophageal reflux disease 12/01/2014     HCC (hepatocellular carcinoma) (H) 1/22/2019     History of diabetic retinopathy 07/2018     HTN (hypertension)      HTN (hypertension) 11/20/2019     Hyperlipidemia      Liver cirrhosis secondary to ESTRADA (H)      Liver transplanted (H) 11/11/2019     Portal vein thrombosis 4/11/2019     Type II diabetes mellitus (H)       Past Surgical History:   Procedure Laterality Date     BYPASS GRAFT ARTERY CORONARY N/A 7/14/2021    Procedure: median sternotomy, on cardiopulmonary bypass, CORONARY ARTERY BYPASS GRAFT (CABG) x2 with left greater saphenous vein endoscopic harvest and left internal mammery artery harvest;  Surgeon: Tom Zapata MD;  Location: [facility] OR     COLONOSCOPY      2015     COLONOSCOPY N/A 12/6/2019    Procedure: COLONOSCOPY, WITH POLYPECTOMY AND BIOPSY;  Surgeon: Adam Morton MD;  [facility]     CV CENTRAL VENOUS CATHETER PLACEMENT N/A 7/12/2021    Procedure: Central Venous Catheter Placement;  Surgeon: Fermin Polanco MD;  Location:  HEART CARDIAC CATH LAB     CV CORONARY ANGIOGRAM N/A 7/12/2021    Procedure: Coronary Angiogram;  Surgeon: Fermin Polanco MD;  Location:  HEART CARDIAC CATH LAB     CV HEART CATHETERIZATION WITH POSSIBLE INTERVENTION N/A 2/26/2019    Procedure: CORS;  Surgeon: Jagdish Hoyt MD;  Location:  HEART CARDIAC CATH LAB     CV INTRA AORTIC  BALLOON N/A 7/12/2021    Procedure: Intra Aortic Balloon Pump Insertion;  Surgeon: Fermin Polanco MD;  Location:  HEART CARDIAC CATH LAB     ESOPHAGOSCOPY, GASTROSCOPY, DUODENOSCOPY (EGD), COMBINED N/A 11/17/2016    Procedure: COMBINED ESOPHAGOSCOPY, GASTROSCOPY, DUODENOSCOPY (EGD);  Surgeon: Santi Rosas MD;  Location:  GI     ESOPHAGOSCOPY, GASTROSCOPY, DUODENOSCOPY (EGD), COMBINED N/A 11/17/2017    Procedure: COMBINED ESOPHAGOSCOPY, GASTROSCOPY, DUODENOSCOPY (EGD);  EGD;  Surgeon: Santi Rosas MD;  Location:  GI     ESOPHAGOSCOPY, GASTROSCOPY, DUODENOSCOPY (EGD), COMBINED N/A 12/28/2018    Procedure: EGD;  Surgeon: Santi Rosas MD;  Location:  OR     ESOPHAGOSCOPY, GASTROSCOPY, DUODENOSCOPY (EGD), COMBINED N/A 12/6/2019    Procedure: ESOPHAGOGASTRODUODENOSCOPY, WITH BIOPSY;  Surgeon: Adam Morton MD;  Location:  GI     ESOPHAGOSCOPY, GASTROSCOPY, DUODENOSCOPY (EGD), COMBINED N/A 2/13/2020    Procedure: ESOPHAGOGASTRODUODENOSCOPY (EGD);  Surgeon: Santi Rosas MD;  Location:  GI     HEAD & NECK SURGERY      12/2017 at [facility].      IMPLANT GOLD WEIGHT EYELID Right 11/16/2017    Procedure: IMPLANT WEIGHT EYELID;  Right Upper Eyelid Weight, right tarsal strip lower eyelid;  Surgeon: Milana Malave MD;  Location:  OR     IR CHEMO EMBOLIZATION  1/22/2019     KNEE SURGERY Left      ORTHOPEDIC SURGERY       PAROTIDECTOMY, RADICAL NECK DISSECTION Right 11/2/2017    Procedure: PAROTIDECTOMY, RADICAL NECK DISSECTION;  Right Superfacial Parotidectomy , Facial nerve repair. with [facility] facial nerve monitor.;  Surgeon: Asiya Morgan MD;  Location: [facility] OR     PHACOEMULSIFICATION CLEAR CORNEA WITH STANDARD INTRAOCULAR LENS IMPLANT Right 1/24/2023    Procedure: PHACOEMULSIFICATION, COMPLEX CATARACT, WITH INTRAOCULAR LENS IMPLANT WITH TRYPAN RIGHT EYE;  Surgeon: Enriqueta Martin MD;  Location: [facility] OR     PHACOEMULSIFICATION WITH STANDARD  INTRAOCULAR LENS IMPLANT Left 1/3/2023    Procedure: PHACOEMULSIFICATION, COMPLEX CATARACT, WITH STANDARD INTRAOCULAR LENS IMPLANT INSERTION LEFT EYE;  Surgeon: Enriqueta Martin MD;  Location: [facility] OR     PICC INSERTION Left 2017    4fr SL BioFlo PICC, 44cm in the L basilic vein w/ tip in the low SVC     RETURN LIVER TRANSPLANT N/A 2019    Procedure: Exploratory laparotomy, hematoma evacuation, abdominal washout;  Surgeon: Александр Ramos MD;  Location: [facility] OR     TRANSPLANT LIVER RECIPIENT  DONOR N/A 2019    Procedure: TRANSPLANT, LIVER, RECIPIENT,  DONOR;  Surgeon: Александр Ramos MD;  Location: [facility] OR     VASCULAR SURGERY            Medications  Prior to Admission medications    Medication Sig Start Date End Date Taking? Authorizing Provider   ammonium lactate (AMLACTIN) 12 % external cream Apply topically 2 times daily To feet. 22  Yes Lamin Gonzalez DPM   aspirin (SM ASPIRIN ADULT LOW STRENGTH) 81 MG EC tablet Take 2 tablets (162 mg) by mouth daily 3/15/23  Yes Layton Romero MD   BD VIKTORIA U/F 32G X 4 MM insulin pen needle Use 5 per day 10/31/22  Yes Frannie Vasquez PA-C   benzoyl peroxide 5 % external liquid Use topically in showers as a body wash 22  Yes Omar Lyon MD   Continuous Blood Gluc Sensor (FREESTYLE CLAUDIA 2 SENSOR) MISC 1 each See Admin Instructions Change every 14 days. 23  Yes Frannie Vasquez PA-C   empagliflozin (JARDIANCE) 10 MG TABS tablet Take 1 tablet (10 mg) by mouth daily 10/31/22  Yes Frannie Vasquez PA-C   insulin aspart (NOVOLOG PEN) 100 UNIT/ML pen Inject 5-10 Units Subcutaneous 4 times daily (with meals and nightly) 1unit : 10 g carb before meals.  Also add 1 unit : 50 mg/dl >180 before meals and at bedtime. 23  Yes Frannie Vasquez PA-C   insulin degludec (TRESIBA FLEXTOUCH) 100 UNIT/ML pen Inject 36 units subcutaneous once daily 23  Yes Vasquez, Frannie F, PA-C   ketoconazole (NIZORAL) 2 % external  shampoo Apply thin layer topically to scalp in shower (leave on 5 min prior to rinse); may also use as a body wash 12/1/22  Yes Omar Lyon MD   ketorolac (ACULAR) 0.5 % ophthalmic solution Place 1 drop into the right eye 4 times daily 1/24/23  Yes Juan Angeles MD   lamiVUDine (EPIVIR) 100 MG tablet Take 1 tablet (100 mg) by mouth daily 4/8/22  Yes Santi Rosas MD   metFORMIN (GLUCOPHAGE XR) 500 MG 24 hr tablet Take 2 tablets (1,000 mg) by mouth daily 1/31/23  Yes Frannie Vasquez PA-C   metoprolol succinate ER (TOPROL XL) 25 MG 24 hr tablet Take 0.5 tablets (12.5 mg) by mouth daily 2/15/23  Yes Manjeet Ireland MD   Multiple Vitamin (TAB-A-GLADIS) TABS TAKE ONE TABLET BY MOUTH ONCE DAILY. FOR ADDITIONAL REFILLS, PLEASE SCHEDULE A FOLLOW-UP APPOINTMENT -[phone] 12/22/22  Yes Rani German MD   mycophenolate (GENERIC EQUIVALENT) 250 MG capsule Take 2 capsules (500 mg) by mouth every 12 hours 2/14/23  Yes Santi Rosas MD   order for DME 1 Device by Device route daily Knee high compression socks 15-20 mmhg. 7/10/20  Yes Lamin Gonzalez DPM   pantoprazole (PROTONIX) 40 MG EC tablet Take 1 tablet (40 mg) by mouth daily before breakfast 1/19/23  Yes Lamin Ortiz MD   prednisoLONE acetate (PRED FORTE) 1 % ophthalmic suspension Place 1 drop into the right eye 4 times daily 1/24/23  Yes Juan Angeles MD   predniSONE (DELTASONE) 5 MG tablet TAKE ONE TABLET BY MOUTH ONCE DAILY 12/20/22  Yes Santi Rosas MD   rosuvastatin (CRESTOR) 5 MG tablet Take 1 tablet (5 mg) by mouth daily 7/11/22  Yes Manjeet Ireland MD   tamsulosin (FLOMAX) 0.4 MG capsule Take 1 capsule (0.4 mg) by mouth daily 3/3/22  Yes Layton Romero MD   Vitamin D3 50 mcg (2000 units) tablet Take 1 tablet (50 mcg) by mouth daily 3/10/23  Yes Bentley Brunner MD   Acetaminophen (TYLENOL) 325 MG CAPS Take 325-650 mg by mouth every 4 hours as needed (pain, fever) 7/14/20   Nerissa Moe  MD KASSY   Continuous Blood Gluc  (FREESTYLE CLAUDIA 14 DAY READER) CECILIO Use to read blood sugars as per 's instructions. 7/10/20   Tish Toscano, CLAUDIA   dorzolamide-timolol (COSOPT) 2-0.5 % ophthalmic solution Place 1 drop into the right eye 2 times daily 1/25/23   Enriqueta Martin MD   ofloxacin (OCUFLOX) 0.3 % ophthalmic solution Place 1 drop into the right eye 4 times daily 1/24/23   Juan Angeles MD       Allergies  Allergies   Allergen Reactions     Codeine Other (See Comments)     Cannot take due to liver  Cannot tolerate oral narcotics     Seasonal Allergies      Sneezing, coughing, runny and itchy eyes       Review of systems  A 10-point review of systems was negative    Examination  BP (!) 166/83   Pulse 90   Temp 98.1  F (36.7  C) (Oral)   Wt 93.8 kg (206 lb 11.2 oz)   SpO2 98%   BMI 30.51 kg/m    Body mass index is 30.51 kg/m .    Gen- well, NAD, A+Ox3, normal color  CVS- RRR  RS- CTA  Abd- central obesity, striae, OLT scar, SNT, BS+  Extr- hands normal, no LEILA  MS- palpable R lower back tenderness- no swelling or erythema  Skin- no rash or jaundice  Neuro- no asterixis  Psych- normal mood    Laboratory  Lab Results   Component Value Date     03/15/2023     06/28/2021    POTASSIUM 3.9 03/15/2023    POTASSIUM 4.7 07/20/2022    POTASSIUM 4.6 06/28/2021    CHLORIDE 101 03/15/2023    CHLORIDE 105 07/20/2022    CHLORIDE 107 06/28/2021    CO2 26 03/15/2023    CO2 26 07/20/2022    CO2 25 06/28/2021    BUN 33.0 03/15/2023    BUN 32 07/20/2022    BUN 33 06/28/2021    CR 2.19 03/15/2023    CR 1.62 06/28/2021       Lab Results   Component Value Date    BILITOTAL 0.4 03/15/2023    BILITOTAL 0.5 06/28/2021    ALT 23 03/15/2023    ALT 20 06/28/2021    AST 18 03/15/2023    AST 9 06/28/2021    ALKPHOS 117 03/15/2023    ALKPHOS 98 06/28/2021       Lab Results   Component Value Date    ALBUMIN 4.8 03/15/2023    ALBUMIN 4.3 07/20/2022    ALBUMIN 4.0 06/28/2021    PROTTOTAL 7.7  03/15/2023    PROTTOTAL 7.4 06/28/2021        Lab Results   Component Value Date    WBC 5.6 03/15/2023    WBC 4.4 06/28/2021    HGB 13.9 03/15/2023    HGB 7.3 06/28/2021    MCV 96 03/15/2023    MCV 96 06/28/2021     03/15/2023     06/28/2021       Lab Results   Component Value Date    INR 1.13 01/11/2022    INR 1.09 01/24/2020       Radiology  Nil recent    Assessment  58-year-old male who presents for follow-up of liver transplant complicated with CKD.  Liver function tests normal on current immunosuppression.  Renal function stable on calcineurin inhibitor-free regimen.  Mild hypercalcemia on routine blood work is unlikely to be contributing to the patient's fatigue.  Right flank pain is musculoskeletal in origin based on history and physical exam.  Up to date with post-transplant HCC surveillance imaging.  Will need DEXA scan this year but otherwise up to date with routine health care maintenance.    Plan  1.  Liver transplant  - continue MMF 500mg PO Q12  - continue pred 5mg PO Q24  - labs per protocol    2.  GERD  - continue pantoprazole 40mg PO Q24 (refill sent)    3.  Health care maintenance  - DEXA scan  - Shingrix vaccine with PCP    Follow-up in 12 months    Santi Banuelos MD  Hepatology  [facility]

## 2023-03-20 NOTE — PATIENT INSTRUCTIONS
-Please measure your blood pressure 2x a day for approximately a week  -Please report your blood pressures to clinic  -Please repeat your kidney function test in 1 week (orders are in the computer)  -Please return to clinic in 6 months

## 2023-03-20 NOTE — LETTER
3/20/2023       RE: Frandy Workman  [address] [facility] [address]     Dear Colleague,    Thank you for referring your patient, Frandy Workman, to the [facility] [place] at . Please see a copy of my visit note below.      Nephrology Clinic     DOS:  2023     Name: Frandy Workman  MRN: [id number]  Age: 58 year old  : 1965  Referring provider: Natalie Russell     Assessment and Plan:  1. CKD, stage 3b (A3): Prior to recent liver transplant baseline Cr ~1.1 mg/dL with eGFR of 75 ml/min. Post-transplant creatinine has gradually fluctuated betwen  ~1.6-1.8 mg/dL (eGFR of 40 ml/min) and remains relatively stable though his Cr is more elevated today at ~2.2 mg/dL.  Unclear if this rise in Cr is due to progression of kidney disease or may be more  pre-renal in nature.  Etiology of CKD likely multifactorial and related to some degree of diabetic changes further complicated by chronic changes from past lithium use (for 15 years) and further complicated by tacrolimus toxicity.  He is at risk of progressive CKD due diabetes.  As mentioned, he is now off of tacrolimus and continues on MMF and prednisone for his liver transplant.  He did have worsening albuminuria that significantly improved after restarting lisinopril  and empagliflozin.  However, he reports that he is no longer taking lisinopril.    -restart RAAS blockade with lisinopril at a low dose of 5 mg daily with goal of slowing progeession of CKD and minimizing albuminuria (albumin to Cr ratio ~563 mg/g today).   -continue empagliflozin but at a lower dose of 10 mg daiy with the goal of minimizing albuminuria, slowing CKD progression, and minimizing cardiovascular events given his past CABG and ischemic HFrEF (45-50% by TTE In )  -will consider increasing lisinopril next if albuminuria worsens      -RTC in 6 months     2. HTN/Volume  status: Euvolemic to slightly hypervolemic on exam. He has not been measuring his home BP recently.  Clinic BP above goal today of at least <130/80.    -he will continue metoprolol XL at 12.5 mg and restart lisinopril 5 mg daily (RAAS blockade both for cardio and renal protection)  -continue empagliflozin which likely has led to better blood pressure control as well  -he will measure his BP daily and report to clinic for further adjustments.       3.  Electrolytes:  Potassium on higher side in past.  Suspect multifactorial in nature and due to CKD with underlying type 4 RTA physiology, hyperglycemia and past use of tacrolimus. It did improve after discontinuation of tacrolimus.  -refered to a dietician in past for more education regarding a low potassium diet.   No specific intervention needed at this time.   -as mentioned, we will restart lisinopril today and recheck potassium in 1 week        4. Anemia: Possibly related to Imuran in the past, which has been discontinued.  Iron stores are replete.  Anemia of chronic disease and renal insufficiency also likely contributing.  He did IVELISSE treatment and hemoglobin improved to 13.9 in the past.  Hemoglobin dropped again requiring restarting IVELISSE therapy (and a blood transfusion).  Fortunately, hemoglobin now stable in the 12's.        -he will continue to follow in the anemia management clinic     5.  Fatigue:  Suspect related to multiple comorbidities and deconditioned state.  We did check a TSH that was within normal limits.     -monitor for now    Follow-up: RTC in 6 months     Reason For Visit:   CKD    HPI:   Frandy Workman is a 57 year old man who presents for CKD f/u.  His past medical history is remarkable for liver transplant (November, 2019) for ESTRADA cirrhosis (complicated by ascites and hepatic encephalopathy on lactulose and rifaximin in the past), hepatocellular carcinoma (s/p TACE and microwave ablation 01/2019), long standing DM 2 (complicated by  nonproliferative diabetic retinopathy, macular edema and peripheral neuropathy), bipolar disorder (previously on lithium for 15 years), HTN, hyperlipidemia and CAD by angiography not requiring PCI.      He denies excessive use of NSAIDs in the past.  He had no history of nephrolithiasis or frequent UTIs.  He has no significant family history of CKD.     In terms of CKD, he appeared to have a baseline of ~1.1 mg/dL prior to his liver transplant in November 2019.  Creatinine after transplant was ~1.3 mg/dL at time of discharge and vikram to 3.2 mg/dL thought to be prerenal from volume depletion.  He was admitted for IVF and creatinine improved to 1.5 mg/dL at time of discharge.  His serum Cr now seems to fluctuate around 1.8 mg/dL with an eGFR of 40 ml/min.  He continues on tacrolimus for his liver transplant.  He no longer is on lasix for management of edema.  He has not required IVELISSE therapy in several months (last August, 2020).  He recently was started on low dose carvedilol for management of hypertension.  He also recently started empagliflozin for management of diabetes. He reports weight gain due to lack of exercise during the COVID pandemic.  His main complaint today is that of pain near his incisional scars for his liver transplant for which he is following up with hepatology.  His BP this morning was 129/84.      Interval History:  Overall, Mr. Workman feels well.  In July, 2021 he had unstble angina in the setting of severe anemia and eventually was noted to have a NSTEMI and underwent a CABG.  He was started on lisinopril and metoprolol but lisinopril was discontinued due to hypotension. His empagliflozin was discontinued during his recent hospitalization. Fortunately, we have been able to restart both lisinopril and empagliflozin. Though he reports today that he is not taking lisinopril. He underwent cardiac rehab and has recovered well.  Anemia has been an intermittent issue requiring blood transfusions and  IVELISSE therapy.  Fortunately, his hemoglobn has now been stable in the 12's.  His only complaint today continues to be that of fatigue.  Recent issues have been bilateral cateract surgery and Mohs surgery for SCC of the scalp.  He also was recently started on metformin and continues on empagliflozin.  He otherwise, has no major medical complaints.        Review of Systems:   Pertinent items are noted in HPI or as below, remainder of complete ROS is negative.      Active Medications:   Current Outpatient Medications   Medication     Acetaminophen (TYLENOL) 325 MG CAPS     ammonium lactate (AMLACTIN) 12 % external cream     aspirin (SM ASPIRIN ADULT LOW STRENGTH) 81 MG EC tablet     BD VIKTORIA U/F 32G X 4 MM insulin pen needle     benzoyl peroxide 5 % external liquid     Continuous Blood Gluc  (FREESTYLE CLAUDIA 14 DAY READER) CECILIO     Continuous Blood Gluc Sensor (FREESTYLE CLAUDIA 2 SENSOR) MISC     dorzolamide-timolol (COSOPT) 2-0.5 % ophthalmic solution     empagliflozin (JARDIANCE) 10 MG TABS tablet     insulin aspart (NOVOLOG PEN) 100 UNIT/ML pen     insulin degludec (TRESIBA FLEXTOUCH) 100 UNIT/ML pen     ketoconazole (NIZORAL) 2 % external shampoo     ketorolac (ACULAR) 0.5 % ophthalmic solution     lamiVUDine (EPIVIR) 100 MG tablet     metFORMIN (GLUCOPHAGE XR) 500 MG 24 hr tablet     metoprolol succinate ER (TOPROL XL) 25 MG 24 hr tablet     Multiple Vitamin (TAB-A-GLADIS) TABS     mycophenolate (GENERIC EQUIVALENT) 250 MG capsule     ofloxacin (OCUFLOX) 0.3 % ophthalmic solution     order for DME     pantoprazole (PROTONIX) 40 MG EC tablet     prednisoLONE acetate (PRED FORTE) 1 % ophthalmic suspension     predniSONE (DELTASONE) 5 MG tablet     rosuvastatin (CRESTOR) 5 MG tablet     tamsulosin (FLOMAX) 0.4 MG capsule     Vitamin D3 50 mcg (2000 units) tablet     Current Facility-Administered Medications   Medication     aflibercept (EYLEA) injection prefilled syringe 2 mg     aflibercept (EYLEA) injection  prefilled syringe 2 mg     aflibercept (EYLEA) injection prefilled syringe 2 mg        Allergies:   Codeine and Seasonal allergies      Past Medical History:  Chronic kidney disease, stage 3  Type 2 diabetes mellitus  Bipolar affective disorder   Brow ptosis  Hepatocellular carcinoma  Coronary artery disease s/p CABG  Hypertension   Anemia   Anxiety   Mild recurrent major depression  Mild nonproliferative retinopathy  Gastroesophageal reflux disease   Cholelithiasis   Benign prostatic hypertrophy   Portal vein thrombosis      Past Surgical History:  Liver transplant recipient   Coronary catheterization  Parotidectomy, radical neck dissection  Right eyelid weight placement  Orthopedic surgery    Family History:   Prostate cancer - maternal grandfather, father   Substance abuse - maternal grandfather, maternal grandmother   Colon cancer - father, paternal grandmother  Cancer - mother  Thyroid disease - mother, sister   Stroke - mother  Depression - mother, sister  Asthma - sister      Social History:   Presents to clinic alone  Tobacco Use: Former smoker, 180 pack year history, quit 2017.   Alcohol Use: quit September 1996  PCP: JEREMIAS NEGRON      Physical Exam:  BP (!) 166/83   Pulse 90   Temp 98.1  F (36.7  C) (Oral)   Wt 93.5 kg (206 lb 1.8 oz)   SpO2 98%   BMI 30.42 kg/m     GA: NAD  HEENT: no scleral icterus, no cervical LAD  CV: regular, no rub, no JVD, no edema  PULM: Lungs CTA B  GI: abd soft, NT, ND  : no CVA tenderness  MSK: no joint effusions, trace LE edema  SKIN: no concerning rash  NEURO: no gross focal deficit     Laboratory:  CMP  Recent Labs   Lab Test 03/15/23  0906 01/24/23  0652 01/03/23  0651 12/14/22  0920 12/14/22  0847 09/07/22  0827 07/20/22  0953 07/20/22  0953 07/13/22  1424 06/08/22  0834 06/08/22  0755 04/20/22  0912 01/11/22  0905 01/10/22  2142 10/04/21  0802 09/24/21  1000 08/23/21  0945 08/09/21  1022 07/21/21  1057 07/21/21  0617 07/12/21  1036 06/28/21  1347  06/14/21  1322 06/04/21  1236 05/05/21  0909     --   --   --  142 140  --  138 140  --  140 140   < > 135   < > 139   < > 136   < > 136   < > 137 139 138 139   POTASSIUM 3.9  --   --   --  4.0 4.7  --  4.7 6.2*  --  4.6 5.4*   < > 5.8*   < > 4.6   < > 4.4   < > 4.1   < > 4.6 4.7 4.6 4.5   CHLORIDE 101  --   --   --  104 103  --  105 108  --  106 107   < > 106   < > 105   < > 105   < > 104   < > 107 106 106 107   CO2 26  --   --   --  24 27  --  26 25  --  27 27   < > 19*   < > 27   < > 24   < > 23   < > 25 26 26 26   ANIONGAP 14  --   --   --  14 10  --  7 7  --  7 6   < > 10   < > 7   < > 7   < > 9   < > 5 7 6 6   * 181* 187*  --  96 126*   < > 269* 202*  --  105* 130*   < > 195*   < > 140*   < > 283*   < > 170*   < > 208* 173* 156* 258*   BUN 33.0*  --   --   --  32.5* 32.9*  --  32* 37*  --  35* 45*   < > 78*   < > 33*   < > 24   < > 18   < > 33* 29 39* 38*   CR 2.19*  --   --  1.9* 1.69* 1.47*  --  1.85* 1.83*   < > 1.77* 1.88*   < > 2.21*   < > 1.30*   < > 1.50*   < > 1.03   < > 1.62* 1.54* 1.64* 1.52*   GFRESTIMATED 34*  --   --  40* 46* 55*  --  42* 42*   < > 44* 41*   < > 34*   < > 61   < > 51*   < > 80   < > 46* 49* 46* 50*   GFRESTBLACK  --   --   --   --   --   --   --   --   --   --   --   --   --   --   --   --   --   --   --   --   --  54* 57* 53* 58*   DEBORAH 10.2*  --   --   --  10.2* 10.1*  --  9.4 9.9  --  9.0 9.2   < > 10.4*   < > 9.2   < > 9.4   < > 7.1*   < > 9.1 9.8 10.2* 9.6   MAG  --   --   --   --   --   --   --   --   --   --   --   --   --  2.3  --  2.0  --  1.8  --  2.7*   < >  --   --  2.2  --    PHOS 3.4  --   --   --   --  3.4  --  3.4  --   --   --  4.2  --   --    < >  --   --   --   --  3.1   < >  --   --   --   --    PROTTOTAL 7.7  --   --   --  7.5  --   --   --  7.8  --  7.2  --    < > 8.6   < > 7.3   < > 7.2   < > 6.2*   < > 7.4 7.8 7.8 7.8   ALBUMIN 4.8  --   --   --  4.7 4.5  --  4.3 4.3  --  4.1 4.2   < > 3.6   < > 4.0   < > 3.7   < > 2.7*   < > 4.0 4.1 4.2 4.2    BILITOTAL 0.4  --   --   --  0.6  --   --   --  0.6  --  0.4  --    < > 0.4   < > 0.6   < > 0.4   < > 0.5   < > 0.5 0.6 0.5 0.5   ALKPHOS 117  --   --   --  116  --   --   --  104  --  85  --    < > 106   < > 78   < > 118   < > 126   < > 98 106 108 112   AST 18  --   --   --  22  --   --   --  10  --  12  --    < > 25   < > 6   < > 5   < > 12   < > 9 8 10 13   ALT 23  --   --   --  30  --   --   --  21  --  26  --    < > 19   < > 17   < > 15   < > 17   < > 20 21 26 28    < > = values in this interval not displayed.     CBC  Recent Labs   Lab Test 03/15/23  0906 12/14/22  0847 09/07/22  0827 07/13/22  1424   HGB 13.9 13.5 12.8* 13.0*   WBC 5.6 6.5 4.7 5.7   RBC 4.48 4.41 4.08* 4.13*   HCT 43.2 41.6 40.3 40.4   MCV 96 94 99 98   MCH 31.0 30.6 31.4 31.5   MCHC 32.2 32.5 31.8 32.2   RDW 14.0 13.9 14.6 13.8    160 119* 153     INR  Recent Labs   Lab Test 01/11/22  2200 07/14/21  1351 07/14/21  1215 07/12/21  1608 11/12/19  1645 11/12/19  1400 11/12/19  0950 11/12/19  0346   INR 1.13 1.28* 1.45* 0.96   < > 1.46*   < > 1.47*   PTT  --  46* 37  --   --  39*  --  57*    < > = values in this interval not displayed.     ABG  Recent Labs   Lab Test 01/10/22  2314 07/15/21  0425 07/14/21  2049 07/14/21  1605 07/14/21  1352 07/14/21  1351 07/14/21  1351 07/14/21  1252 07/14/21  1216   PH 7.37  --   --  7.37  --   --  7.29* 7.36 7.38   PCO2  --   --   --  37  --   --  44 35 36   PO2  --   --   --  87  --   --  145* 181* 306*   HCO3  --   --   --  21  --   --  21 20* 21   O2PER  --  21 21 21 5   < > 5 50.0 75.0    < > = values in this interval not displayed.      URINE STUDIES  Recent Labs   Lab Test 03/15/23  0915 01/11/22  0001 09/07/21  1115 12/04/19  1400   COLOR Straw Light Yellow Yellow Light Yellow   APPEARANCE Clear Clear Cloudy* Clear   URINEGLC >=1000* >=1000* 50* 30*   URINEBILI Negative Negative Negative Negative   URINEKETONE Negative Negative Negative Negative   SG 1.023 1.018 1.022 1.009   UBLD Trace*  Negative Small* Negative   URINEPH 5.5 5.0 5.0 5.0   PROTEIN 50* Negative >499* 30*   NITRITE Negative Negative Negative Negative   LEUKEST Negative Negative Negative Negative   RBCU 1 <1 1 0   WBCU <1 1 1 1     Recent Labs   Lab Test 04/20/22  0919 10/04/21  0804 09/07/21  1115 02/26/21  0750 06/29/20  1008 03/02/20  1013 12/02/19  1040 07/08/19  1017 02/04/19  0705   UTPG 0.12 1.46* 1.17* 0.47* 0.89* 0.29* 1.22* 0.11 0.14     PTH  Recent Labs   Lab Test 03/15/23  0906 04/20/22  0912 10/04/21  0802 06/29/20  1005 07/08/19  1020   PTHI 78* 59 81* 61 54     IRON STUDIES   Recent Labs   Lab Test 04/25/22  0925 02/09/22  0850 11/16/21  1004 11/02/21  1031 07/12/21  1608 06/28/21  1347 06/04/21  1236 12/02/20  0739 11/11/20  0754 10/14/20  0836 09/16/20  0802 07/29/20  0749 06/29/20  1005 05/21/20  0723 04/22/20  0833 03/09/20  0823 01/27/20  1340 12/02/19  1034 12/28/18  1108 09/15/18  1215 06/09/17  1250   IRON 119 96 93 109  --  112 158 75 81 73 93 87 60 72 65 92  --  88  --  52  --     248 211* 241  --  226* 270 228* 227* 248 221* 230* 204* 192* 215* 231*  --  248  --  403  --    IRONSAT 42 39 44 45  --  50* 59* 33 36 29 42 38 29 38 30 40  --  36  --  13*  --    QUAN 508* 1,046* 400* 479* 543* 495* 399* 433* 286 323 223 193 352 344 643* 859* 690* 1,353* 90 10* 450*       All above labs reviewed by me.   Eloy Rao MD   [phone]

## 2023-03-20 NOTE — TELEPHONE ENCOUNTER
Called patient, told him orders are in, please schedule with [phone]  He said he will call them tomorrow to schedule it       ----- Message from Sanit Dotson MD sent at 3/20/2023  2:08 PM CDT -----  Regarding: dexa scan  Hi    Can we get patient set up for nonurgent dexa scan please?  Epic was not working at time of his visit but order is in now.    Thanks    Mt

## 2023-03-20 NOTE — LETTER
3/20/2023         RE: Frandy Workman  [address] [facility] [address]        Dear Colleague,    Thank you for referring your patient, Frandy Workman, to the [facility] [place]. Please see a copy of my visit note below.    [facility] Hepatology    Follow-up Visit    Follow-up visit for liver transplant    Subjective:  58 year old male    OLT 11/11/19  - ESTRADA/HCC  - mikaela-op MELD= 12  - donor- donor age 63/local/DBD/ECD- hep B core positive/donor CMV(+)/recipient CMV(-)  - operation- CiT 7:39, EBL 2L, piggyback IVC, duct-to-duct biliary anastomosis  - post-op course- perihepatic hematoma; MMF colitis Dec 2019  - immunosuppression- MMF 500mg Q12, pred 5     HCC  - dx Dec 2018  - MRI liver 12/23/18- 3.1cm lesion segment IVB, 1.1cm lesion segment III  - AFP 12/28/18= 5.2  - bone scan 1/4/19  - CT chest 1/4/19  - TACE 1/22/19 (seg IV); microwave ablation 1/23/19 (seg III)  - explant pathology- no viable tumor; moderately differentiated; no vascular invasion  - last MRI liver 9/25/2020, with no evidence of recurrence  - last imaging CT C/A/P Dec 2022  - last AFP Jun 2022= 2.3    Last visit 03/2022.  Patient has since had bilateral cataract surgery.  He also had Mohs surgery for SCC of scalp in Feb 2023.  The patient has been started on metformin for hyperglycemia.  No hospital admissions or ER visits since last visit.    The patient is well overall today.  His main complaint is fatigue for which his primary care doctor is ordering a sleep study.  He also reports some mild right lower flank pain which is worse with movement.    The patient denies jaundice, lower extremity edema or abdominal pain.    The patient denies nausea, vomiting, constipation or diarrhea.    The patient denies any fever, sweats or chills.  He is up to date with his COVID and influenza vaccinations.  He has not yet had shingles vaccination.    Weight is increased to 206 pounds since last visit.   Appetite is good.    The patient's morning blood sugars have been increased to 150-190 resulting in addition of metformin to his antidiabetic medications.    The patient is fairly adherent to his medications.  No issues obtaining his medications through insurance or pharmacy.    The patient does not drink alcohol.  He continues to live by himself in [facility].    Health maintenance  Skin check Feb 2023  Colonoscopy due 2024  Lipids Dec 2022  Vaccinations up to date  Bone density Feb 2019    Medical hx Surgical hx   Past Medical History:   Diagnosis Date     Anemia 2013     Arthritis      BPH (benign prostatic hyperplasia)      CAD (coronary artery disease) 4/1/2019     Cholelithiasis      Conductive hearing loss 08/16/2017     Depressive disorder 1986    Suffer effects throughout life     Gastroesophageal reflux disease 12/01/2014     HCC (hepatocellular carcinoma) (H) 1/22/2019     History of diabetic retinopathy 07/2018     HTN (hypertension)      HTN (hypertension) 11/20/2019     Hyperlipidemia      Liver cirrhosis secondary to ESTRADA (H)      Liver transplanted (H) 11/11/2019     Portal vein thrombosis 4/11/2019     Type II diabetes mellitus (H)       Past Surgical History:   Procedure Laterality Date     BYPASS GRAFT ARTERY CORONARY N/A 7/14/2021    Procedure: median sternotomy, on cardiopulmonary bypass, CORONARY ARTERY BYPASS GRAFT (CABG) x2 with left greater saphenous vein endoscopic harvest and left internal mammery artery harvest;  Surgeon: Tom Zapata MD;  Location:  OR     COLONOSCOPY      2015     COLONOSCOPY N/A 12/6/2019    Procedure: COLONOSCOPY, WITH POLYPECTOMY AND BIOPSY;  Surgeon: Adam Morton MD;  Location:  GI     CV CENTRAL VENOUS CATHETER PLACEMENT N/A 7/12/2021    Procedure: Central Venous Catheter Placement;  Surgeon: Fermin Polanco MD;  Location:  HEART CARDIAC CATH LAB     CV CORONARY ANGIOGRAM N/A 7/12/2021    Procedure: Coronary Angiogram;   Surgeon: Fermin Polanco MD;  Location:  HEART CARDIAC CATH LAB     CV HEART CATHETERIZATION WITH POSSIBLE INTERVENTION N/A 2/26/2019    Procedure: CORS;  Surgeon: Jagdish Hoyt MD;  Location:  HEART CARDIAC CATH LAB     CV INTRA AORTIC BALLOON N/A 7/12/2021    Procedure: Intra Aortic Balloon Pump Insertion;  Surgeon: Fermin Polanco MD;  Location: [facility] CARDIAC CATH LAB     ESOPHAGOSCOPY, GASTROSCOPY, DUODENOSCOPY (EGD), COMBINED N/A 11/17/2016    Procedure: COMBINED ESOPHAGOSCOPY, GASTROSCOPY, DUODENOSCOPY (EGD);  Surgeon: Santi Rosas MD;  Location:  GI     ESOPHAGOSCOPY, GASTROSCOPY, DUODENOSCOPY (EGD), COMBINED N/A 11/17/2017    Procedure: COMBINED ESOPHAGOSCOPY, GASTROSCOPY, DUODENOSCOPY (EGD);  EGD;  Surgeon: Santi Rosas MD;  Location:  GI     ESOPHAGOSCOPY, GASTROSCOPY, DUODENOSCOPY (EGD), COMBINED N/A 12/28/2018    Procedure: EGD;  Surgeon: Santi Rosas MD;  Location:  OR     ESOPHAGOSCOPY, GASTROSCOPY, DUODENOSCOPY (EGD), COMBINED N/A 12/6/2019    Procedure: ESOPHAGOGASTRODUODENOSCOPY, WITH BIOPSY;  Surgeon: Adam Morton MD;  Location:  GI     ESOPHAGOSCOPY, GASTROSCOPY, DUODENOSCOPY (EGD), COMBINED N/A 2/13/2020    Procedure: ESOPHAGOGASTRODUODENOSCOPY (EGD);  Surgeon: Santi Rosas MD;  Location:  GI     HEAD & NECK SURGERY      12/2017 at [facility].      IMPLANT GOLD WEIGHT EYELID Right 11/16/2017    Procedure: IMPLANT WEIGHT EYELID;  Right Upper Eyelid Weight, right tarsal strip lower eyelid;  Surgeon: Milana Malave MD;  Location: [facility] OR     IR CHEMO EMBOLIZATION  1/22/2019     KNEE SURGERY Left      ORTHOPEDIC SURGERY       PAROTIDECTOMY, RADICAL NECK DISSECTION Right 11/2/2017    Procedure: PAROTIDECTOMY, RADICAL NECK DISSECTION;  Right Superfacial Parotidectomy , Facial nerve repair. with [facility] facial nerve monitor.;  Surgeon: Asiya Morgan MD;  Location: [facility] OR     PHACOEMULSIFICATION CLEAR  CORNEA WITH STANDARD INTRAOCULAR LENS IMPLANT Right 2023    Procedure: PHACOEMULSIFICATION, COMPLEX CATARACT, WITH INTRAOCULAR LENS IMPLANT WITH TRYPAN RIGHT EYE;  Surgeon: Enriqueta Martin MD;  Location: [facility] OR     PHACOEMULSIFICATION WITH STANDARD INTRAOCULAR LENS IMPLANT Left 1/3/2023    Procedure: PHACOEMULSIFICATION, COMPLEX CATARACT, WITH STANDARD INTRAOCULAR LENS IMPLANT INSERTION LEFT EYE;  Surgeon: Enriqueta Martin MD;  Location: [facility] OR     PICC INSERTION Left 2017    4fr SL BioFlo PICC, 44cm in the L basilic vein w/ tip in the low SVC     RETURN LIVER TRANSPLANT N/A 2019    Procedure: Exploratory laparotomy, hematoma evacuation, abdominal washout;  Surgeon: Александр Ramos MD;  Location: [facility] OR     TRANSPLANT LIVER RECIPIENT  DONOR N/A 2019    Procedure: TRANSPLANT, LIVER, RECIPIENT,  DONOR;  Surgeon: Александр Ramos MD;  Location: [facility] OR     VASCULAR SURGERY            Medications  Prior to Admission medications    Medication Sig Start Date End Date Taking? Authorizing Provider   ammonium lactate (AMLACTIN) 12 % external cream Apply topically 2 times daily To feet. 22  Yes Lamin Gonzalez DPM   aspirin (SM ASPIRIN ADULT LOW STRENGTH) 81 MG EC tablet Take 2 tablets (162 mg) by mouth daily 3/15/23  Yes Layton Romero MD   BD VIKTORIA U/F 32G X 4 MM insulin pen needle Use 5 per day 10/31/22  Yes Frannie Vasquez PA-C   benzoyl peroxide 5 % external liquid Use topically in showers as a body wash 22  Yes Omar Lyon MD   Continuous Blood Gluc Sensor (FREESTYLE CLAUDIA 2 SENSOR) [facility] 1 each See Admin Instructions Change every 14 days. 23  Yes Frannie Vasquez PA-C   empagliflozin (JARDIANCE) 10 MG TABS tablet Take 1 tablet (10 mg) by mouth daily 10/31/22  Yes Frannie Vasquez PA-C   insulin aspart (NOVOLOG PEN) 100 UNIT/ML pen Inject 5-10 Units Subcutaneous 4 times daily (with meals and nightly) 1unit : 10 g carb before meals.   Also add 1 unit : 50 mg/dl >180 before meals and at bedtime. 1/18/23  Yes Frannie Vasquez PA-C   insulin degludec (TRESIBA FLEXTOUCH) 100 UNIT/ML pen Inject 36 units subcutaneous once daily 1/31/23  Yes Frannie Vasquez PA-C   ketoconazole (NIZORAL) 2 % external shampoo Apply thin layer topically to scalp in shower (leave on 5 min prior to rinse); may also use as a body wash 12/1/22  Yes Omar Lyon MD   ketorolac (ACULAR) 0.5 % ophthalmic solution Place 1 drop into the right eye 4 times daily 1/24/23  Yes Juan Angeles MD   lamiVUDine (EPIVIR) 100 MG tablet Take 1 tablet (100 mg) by mouth daily 4/8/22  Yes Santi Rosas MD   metFORMIN (GLUCOPHAGE XR) 500 MG 24 hr tablet Take 2 tablets (1,000 mg) by mouth daily 1/31/23  Yes Frannie Vasquez PA-C   metoprolol succinate ER (TOPROL XL) 25 MG 24 hr tablet Take 0.5 tablets (12.5 mg) by mouth daily 2/15/23  Yes Manjeet Ireland MD   Multiple Vitamin (TAB-A-GLADIS) TABS TAKE ONE TABLET BY MOUTH ONCE DAILY. FOR ADDITIONAL REFILLS, PLEASE SCHEDULE A FOLLOW-UP APPOINTMENT -[phone] 12/22/22  Yes Rani German MD   mycophenolate (GENERIC EQUIVALENT) 250 MG capsule Take 2 capsules (500 mg) by mouth every 12 hours 2/14/23  Yes Santi Rosas MD   order for DME 1 Device by Device route daily Knee high compression socks 15-20 mmhg. 7/10/20  Yes Lamin Gonzalez DPM   pantoprazole (PROTONIX) 40 MG EC tablet Take 1 tablet (40 mg) by mouth daily before breakfast 1/19/23  Yes Lamin Ortiz MD   prednisoLONE acetate (PRED FORTE) 1 % ophthalmic suspension Place 1 drop into the right eye 4 times daily 1/24/23  Yes Juan Angeles MD   predniSONE (DELTASONE) 5 MG tablet TAKE ONE TABLET BY MOUTH ONCE DAILY 12/20/22  Yes Santi Rosas MD   rosuvastatin (CRESTOR) 5 MG tablet Take 1 tablet (5 mg) by mouth daily 7/11/22  Yes Manjeet Ireland MD   tamsulosin (FLOMAX) 0.4 MG capsule Take 1 capsule (0.4 mg) by mouth daily 3/3/22  Yes  Layton Romero MD   Vitamin D3 50 mcg (2000 units) tablet Take 1 tablet (50 mcg) by mouth daily 3/10/23  Yes Bentley Brunner MD   Acetaminophen (TYLENOL) 325 MG CAPS Take 325-650 mg by mouth every 4 hours as needed (pain, fever) 7/14/20   Nerissa Moe MD   Continuous Blood Gluc  (FREESTYLE CLAUDIA 14 DAY READER) CECILIO Use to read blood sugars as per 's instructions. 7/10/20   Tish Toscano PA-C   dorzolamide-timolol (COSOPT) 2-0.5 % ophthalmic solution Place 1 drop into the right eye 2 times daily 1/25/23   Enriqueta Martin MD   ofloxacin (OCUFLOX) 0.3 % ophthalmic solution Place 1 drop into the right eye 4 times daily 1/24/23   Juan Angeles MD       Allergies  Allergies   Allergen Reactions     Codeine Other (See Comments)     Cannot take due to liver  Cannot tolerate oral narcotics     Seasonal Allergies      Sneezing, coughing, runny and itchy eyes       Review of systems  A 10-point review of systems was negative    Examination  BP (!) 166/83   Pulse 90   Temp 98.1  F (36.7  C) (Oral)   Wt 93.8 kg (206 lb 11.2 oz)   SpO2 98%   BMI 30.51 kg/m    Body mass index is 30.51 kg/m .    Gen- well, NAD, A+Ox3, normal color  CVS- RRR  RS- CTA  Abd- central obesity, striae, OLT scar, SNT, BS+  Extr- hands normal, no BENTLEY  MS- palpable R lower back tenderness- no swelling or erythema  Skin- no rash or jaundice  Neuro- no asterixis  Psych- normal mood    Laboratory  Lab Results   Component Value Date     03/15/2023     06/28/2021    POTASSIUM 3.9 03/15/2023    POTASSIUM 4.7 07/20/2022    POTASSIUM 4.6 06/28/2021    CHLORIDE 101 03/15/2023    CHLORIDE 105 07/20/2022    CHLORIDE 107 06/28/2021    CO2 26 03/15/2023    CO2 26 07/20/2022    CO2 25 06/28/2021    BUN 33.0 03/15/2023    BUN 32 07/20/2022    BUN 33 06/28/2021    CR 2.19 03/15/2023    CR 1.62 06/28/2021       Lab Results   Component Value Date    BILITOTAL 0.4 03/15/2023    BILITOTAL 0.5 06/28/2021    ALT  23 03/15/2023    ALT 20 06/28/2021    AST 18 03/15/2023    AST 9 06/28/2021    ALKPHOS 117 03/15/2023    ALKPHOS 98 06/28/2021       Lab Results   Component Value Date    ALBUMIN 4.8 03/15/2023    ALBUMIN 4.3 07/20/2022    ALBUMIN 4.0 06/28/2021    PROTTOTAL 7.7 03/15/2023    PROTTOTAL 7.4 06/28/2021        Lab Results   Component Value Date    WBC 5.6 03/15/2023    WBC 4.4 06/28/2021    HGB 13.9 03/15/2023    HGB 7.3 06/28/2021    MCV 96 03/15/2023    MCV 96 06/28/2021     03/15/2023     06/28/2021       Lab Results   Component Value Date    INR 1.13 01/11/2022    INR 1.09 01/24/2020       Radiology  Nil recent    Assessment  58-year-old male who presents for follow-up of liver transplant complicated with CKD.  Liver function tests normal on current immunosuppression.  Renal function stable on calcineurin inhibitor-free regimen.  Mild hypercalcemia on routine blood work is unlikely to be contributing to the patient's fatigue.  Right flank pain is musculoskeletal in origin based on history and physical exam.  Up to date with post-transplant HCC surveillance imaging.  Will need DEXA scan this year but otherwise up to date with routine health care maintenance.    Plan  1.  Liver transplant  - continue MMF 500mg PO Q12  - continue pred 5mg PO Q24  - labs per protocol    2.  GERD  - continue pantoprazole 40mg PO Q24 (refill sent)    3.  Health care maintenance  - DEXA scan  - Shingrix vaccine with PCP    Follow-up in 12 months    Santi Banuelos MD  Hepatology  [facility]

## 2023-03-21 ENCOUNTER — VIRTUAL VISIT (OUTPATIENT)
Dept: BEHAVIORAL HEALTH | Facility: CLINIC | Age: 58
End: 2023-03-21
Payer: MEDICARE

## 2023-03-21 ENCOUNTER — TELEPHONE (OUTPATIENT)
Dept: DERMATOLOGY | Facility: CLINIC | Age: 58
End: 2023-03-21
Payer: MEDICARE

## 2023-03-21 DIAGNOSIS — E11.3313 TYPE 2 DIABETES MELLITUS WITH MODERATE NONPROLIFERATIVE RETINOPATHY OF BOTH EYES AND MACULAR EDEMA, UNSPECIFIED WHETHER LONG TERM INSULIN USE (H): Primary | ICD-10-CM

## 2023-03-21 DIAGNOSIS — F31.31 BIPOLAR 1 DISORDER, DEPRESSED, MILD (H): Primary | ICD-10-CM

## 2023-03-21 DIAGNOSIS — N40.1 BENIGN PROSTATIC HYPERPLASIA WITH LOWER URINARY TRACT SYMPTOMS, SYMPTOM DETAILS UNSPECIFIED: ICD-10-CM

## 2023-03-21 PROCEDURE — 90832 PSYTX W PT 30 MINUTES: CPT | Mod: VID | Performed by: PSYCHOLOGIST

## 2023-03-21 NOTE — TELEPHONE ENCOUNTER
[facility] Call Center    Phone Message    May a detailed message be left on voicemail: yes     Reason for Call: Other: [employer] needs to know if the patient has any work restrictions following his 2/15 MOHS. Please call to confirm. Thank you.      Action Taken: Message routed to:  [facility] ([facility] Derm Surg    Travel Screening: Not Applicable

## 2023-03-21 NOTE — PROGRESS NOTES
[facility]: Integrated Behavioral Health  March 21, 2023        Behavioral Health Clinician Progress Note    Patient Name: Frandy Workman           Service Type: Video visit      Service Location:  Video visit      Session Start Time: 2:00  Session End Time:  2:16      Session Length: 16 - 37      Attendees: Patient    Visit Activities (Refresh list every visit): [facility] Only     Service Modality:  Video Visit:      Provider verified identity through the following two step process.  Patient provided:  Patient photo and Patient is known previously to provider    Telemedicine Visit: The patient's condition can be safely assessed and treated via synchronous audio and visual telemedicine encounter.      Reason for Telemedicine Visit: Services only offered telehealth    Originating Site (Patient Location): Patient's home    Distant Site (Provider Location): Provider Remote Setting- Home Office    Consent:  The patient/guardian has verbally consented to: the potential risks and benefits of telemedicine (video visit) versus in person care; bill my insurance or make self-payment for services provided; and responsibility for payment of non-covered services.     Patient would like the video invitation sent by:  My Chart    Mode of Communication:  Video Conference via [employer]    Distant Location (Provider):  Off-site    As the provider I attest to compliance with applicable laws and regulations related to telemedicine.      Diagnostic Assessment Date:  12/03/2020  Treatment Plan Review Date:  4/19/2023  See Flowsheets for today's PHQ-9 and LIZZETTE-7 results  Previous PHQ-9:   PHQ-9 SCORE 1/18/2023 2/28/2023 3/20/2023   PHQ-9 Total Score MyChart 8 (Mild depression) 6 (Mild depression) -   PHQ-9 Total Score 8 6 11     Previous LIZZETTE-7:   LIZZETTE-7 SCORE 12/29/2020 4/7/2021 9/30/2021   Total Score 8 (mild anxiety) 9 (mild anxiety) -   Total Score 8 9 10       Extended Session (60+ minutes): No  Interactive  Complexity: No  Crisis: No    Treatment Objective(s) Addressed in This Session:  Target Behavior(s): disease management/lifestyle changes  related to adaptive approaches to managing anxious distress and mood difficulties    Depressed mood: Increase interest, pleasure in doing things.      Current Stressors / Issues:  [facility] met with [facility] today for a follow-up. Brief session with Miller today, mostly to check-in, see if he needed additional support/guidance on treatment of his emotional health. Reports doing well overall. Helped Miller today prioritize his needs/goals and objectives. States need to work on paying bills, filing his taxes, and getting more exercise. He identified how exercise will be important for his diabetes management in particular. Explored ways of taking small steps toward change with exercise, such as increasing movement by small increments, around five minutes. Also talked about incorporating movement after meals to help improve exercise and diabetes management, around 2-3 minutes of walking. Supportive and solution-focused therapies emphasized today.     Answers for HPI/ROS submitted by the patient on 7/12/2022  If you checked off any problems, how difficult have these problems made it for you to do your work, take care of things at home, or get along with other people?: Somewhat difficult  PHQ9 TOTAL SCORE: 4    Answers for HPI/ROS submitted by the patient on 9/8/2022  If you checked off any problems, how difficult have these problems made it for you to do your work, take care of things at home, or get along with other people?: Somewhat difficult  PHQ9 TOTAL SCORE: 3    Answers for HPI/ROS submitted by the patient on 11/21/2022  If you checked off any problems, how difficult have these problems made it for you to do your work, take care of things at home, or get along with other people?: Very difficult  PHQ9 TOTAL SCORE: 4      Answers for HPI/ROS submitted by the patient on 1/18/2023  If you checked  off any problems, how difficult have these problems made it for you to do your work, take care of things at home, or get along with other people?: Extremely difficult  PHQ9 TOTAL SCORE: 8        Progress on Treatment Objective(s) / Homework:  Stable - ACTION (Actively working towards change); Intervened by reinforcing change plan / affirming steps taken      Solution-Focused Therapy    Explore patterns in patient's relationships and discuss options for new behaviors.    Explore patterns in patient's actions and choices and discuss options for new behaviors.    Behavioral Activation    Discuss steps patient can take to become more involved in meaningful activity.    Identify barriers to these activities and explore possible solutions.      Answers for HPI/ROS submitted by the patient on 3/21/2022  If you checked off any problems, how difficult have these problems made it for you to do your work, take care of things at home, or get along with other people?: Not difficult at all  PHQ9 TOTAL SCORE: 6      Answers for HPI/ROS submitted by the patient on 2/8/2022  If you checked off any problems, how difficult have these problems made it for you to do your work, take care of things at home, or get along with other people?: Somewhat difficult  PHQ9 TOTAL SCORE: 4      Answers for HPI/ROS submitted by the patient on 4/7/2021   LIZZETTE 7 TOTAL SCORE: 9  If you checked off any problems, how difficult have these problems made it for you to do your work, take care of things at home, or get along with other people?: Very difficult  PHQ9 TOTAL SCORE: 7*    *No SI    Answers for HPI/ROS submitted by the patient on 10/13/2020   If you checked off any problems, how difficult have these problems made it for you to do your work, take care of things at home, or get along with other people?: Somewhat difficult  PHQ9 TOTAL SCORE: 8*  LIZZETTE 7 TOTAL SCORE: 6      *No SI     Answers for HPI/ROS submitted by the patient on 12/29/2020   If you  checked off any problems, how difficult have these problems made it for you to do your work, take care of things at home, or get along with other people?: Somewhat difficult  PHQ9 TOTAL SCORE: 5*  LIZZETTE 7 TOTAL SCORE: 8    *No SI     Answers for HPI/ROS submitted by the patient on 11/21/2022  If you checked off any problems, how difficult have these problems made it for you to do your work, take care of things at home, or get along with other people?: Very difficult  PHQ9 TOTAL SCORE: 4          Care Plan review completed: no    Medication Review:  No changes to current psychiatric medication(s)     Current Outpatient Medications   Medication     acetaminophen (TYLENOL) 325 MG tablet     ammonium lactate (AMLACTIN) 12 % external cream     aspirin (SM ASPIRIN ADULT LOW STRENGTH) 81 MG EC tablet     BD VIKTORIA U/F 32G X 4 MM insulin pen needle     benzoyl peroxide 5 % external liquid     Continuous Blood Gluc Sensor (FREESTYLE CLAUDIA 2 SENSOR) MISC     empagliflozin (JARDIANCE) 10 MG TABS tablet     insulin aspart (NOVOLOG PEN) 100 UNIT/ML pen     insulin degludec (TRESIBA FLEXTOUCH) 100 UNIT/ML pen     ketoconazole (NIZORAL) 2 % external shampoo     ketorolac (ACULAR) 0.5 % ophthalmic solution     lamiVUDine (EPIVIR) 100 MG tablet     lisinopril (ZESTRIL) 5 MG tablet     metFORMIN (GLUCOPHAGE XR) 500 MG 24 hr tablet     metoprolol succinate ER (TOPROL XL) 25 MG 24 hr tablet     Multiple Vitamin (TAB-A-GLADIS) TABS     mycophenolate (GENERIC EQUIVALENT) 250 MG capsule     order for DME     pantoprazole (PROTONIX) 40 MG EC tablet     prednisoLONE acetate (PRED FORTE) 1 % ophthalmic suspension     predniSONE (DELTASONE) 5 MG tablet     rosuvastatin (CRESTOR) 5 MG tablet     tamsulosin (FLOMAX) 0.4 MG capsule     Vitamin D3 50 mcg (2000 units) tablet     Current Facility-Administered Medications   Medication     aflibercept (EYLEA) injection prefilled syringe 2 mg         Medication Compliance:  Yes    Changes in Health  Issues:   None reported    Chemical Use Review:   Substance Use: Chemical use reviewed, no active concerns identified      Tobacco Use: No current tobacco use.         Assessment: Current Emotional / Mental Status (status of significant symptoms):  Risk status (Self / Other harm or suicidal ideation)  Patient denies a history of suicidal ideation, suicide attempts, self-injurious behavior, homicidal ideation, homicidal behavior and and other safety concerns     Patient denies current fears or concerns for personal safety.  Patient denies current or recent suicidal ideation or behaviors.  Patient denies current or recent homicidal ideation or behaviors.  Patient denies current or recent self injurious behavior or ideation.  Patient denies other safety concerns.     A safety and risk management plan has not been developed at this time, however patient was encouraged to call [place] [phone] should there be a change in any of these risk factors.    [place] Suicide Severity Rating Scale (Short Version)  [place] Suicide Severity Rating (Short Version) 12/10/2019 6/11/2020 7/1/2020 7/12/2021 1/10/2022 1/3/2023 1/24/2023   Over the past 2 weeks have you felt down, depressed, or hopeless? yes no no no no no no   Over the past 2 weeks have you had thoughts of killing yourself? no no no no no no -   Comments - - - - - - -   Have you ever attempted to kill yourself? no no no no no no -   Q1 Wished to be Dead (Past Month) no - - - - no -   Comments - - - - - - -   Q2 Suicidal Thoughts (Past Month) no - - - - no -   Comments - - - - - - -   Q3 Suicidal Thought Method no - - - - no -   Q4 Suicidal Intent without Specific Plan no - - - - no -   Q5 Suicide Intent with Specific Plan no - - - - no -   Q6 Suicide Behavior (Lifetime) no - - - - no -   Level of Risk per Screen - - - - - low risk -         Appearance:   Appropriate   Eye Contact:   Good   Psychomotor Behavior: TD evident   Attitude:   Cooperative  Interested  Friendly Pleasant  Orientation:   All  Speech   Rate / Production: Normal/ Responsive   Volume:  Normal   Mood:    Depressed ; improving  Affect:    Appropriate    Thought Content:  Clear   Thought Form:  Goal Directed  Logical   Insight:    Good     Diagnoses:  1. Bipolar 1 disorder, depressed, mild (H)          Collateral Reports Completed:  Not Applicable    Plan: (Homework, other):  Continue with this writer for individual psychotherapy in 3 wks. He will continue to work on managing depression and anxiety through achievable behavioral activation ideas. Information about behavioral activation provided  Today; he plans to increase physical activity by walking each day, leading up to light exercise around 5-10 minutes.  No CD issues.     Joseph Murillo, RED  March 21, 2023          ______________________________________________________________________                                            Individual Treatment Plan    Patient's Name: Frandy Workman  YOB: 1965    Date of Creation: 3/1/2022  Date Treatment Plan Last Reviewed/Revised: 1/19/2023    DSM5 Diagnoses: 296.51 Bipolar I Disorder Current or Most Recent Episode Depressed, Mild or 300.02 (F41.1) Generalized Anxiety Disorder  Psychosocial / Contextual Factors: Post Solid Organ Tx  PROMIS (reviewed every 90 days): 4    Referral / Collaboration:  Referral to another professional/service is not indicated at this time..    Anticipated number of session for this episode of care: 4-6  Anticipation frequency of session: Every other week  Anticipated Duration of each session: 38-52 minutes  Treatment plan will be reviewed in 90 days or when goals have been changed.       MeasurableTreatment Goal(s) related to diagnosis / functional impairment(s)  Goal 1: Patient will experience a reduction in depressive symptoms along with a corresponding increase in positive emotion and life satisfaction.      Objective #A (Patient Action)    Patient will Increase  "interest, engagement, and pleasure in doing things.  Status: Continued - Date(s): 1/19/2023    Intervention(s)  Therapist will help patient identify pleasant and mastery oriented events that elicit positive, relaxed mood.    Objective #B  Patient will Decrease frequency and intensity of feeling down, depressed, hopeless.  Status: Continued - Date(s): 1/19/2023    Intervention(s)  Therapist will introduce patient to cognitive-behavioral and acceptance and commitment therapy topics aimed to help reduce depression and anxiety    Objective #C  Patient will Identify negative self-talk and behaviors: challenge core beliefs, myths, and actions  Improve concentration, focus, and mindfulness in daily activities .  Status: Continued - Date(s): COMPLETE    Intervention(s)  Therapist will help patient identify and manage negative self-talk and automatic thoughts; introduce patient to cognitive distortions; help patient develop cognitive diffusion techniques      Goal 2: Patient will experience a reduction in anxious symptoms, along with a corresponding increase in relaxed emotional states and life satisfaction.      Objective #A (Patient Action)  Patient will use cognitive-behavioral and thought diffusion strategies identified in therapy to challenge anxious thoughts.    Status: Continued - Date(s): COMPLETE    Intervention(s)  Therapist will utilize CBT and ACT ideas to help patient challenge anxious thoughts and reduce intensity/duration of anxious distress    Objective #B  Patient will use \"worry time\" each day for 15 minutes of scheduled worry and then defer obsessive or anxious thinking until the next structured worry time.    Status: Continued - Date(s): COMPLETE    Intervention(s)  Therapist will teach patient how to effectively utilize worry time and/or thought logs/journals each day and incorporate more relaxation behaviors into their routine.    Objective #C  Patient will identify the stressors which contribute to " feelings of anxiety  Patient will increase engagement in adaptive coping skills and recreational activities, such as exercise and healthy socialization, to manage distress.  Status: Continued - Date(s): COMPLETE    Intervention(s)  Therapist will help patient identify triggers/situational factors that contribute to anxiety and behavioral skills aimed to manage anxious distress.      Goal 3: Patiient will work toward adaptively managing bipolar-related depression and manic episodes      Objective #A (Patient Action)    Status: Continued - Date(s): COMPLETE    Patient will identify 2-3 signs or signals of emerging mood instability.    Intervention(s)  Therapist will provide educational materials on bipolar disorder and help identifying triggers for mood instability.    Objective #B  Patient will identify 2-3 strategies for more effectively managing Bipolar Disorder.    Status: Continued - Date(s): COMPLETE    Intervention(s)  Therapist will teach emotional recognition/identification. Skills aimed to effectively manage bipolar disorder.      Other Possible Therapeutic Intervention(s):    Psycho-education regarding mental health diagnoses and treatment options    Supportive Therapy    Provide affirmations, reflections, and establish working rapport    Emphasize and reflect on strength of therapeutic relationship    Skills training    Explore skills useful to client in current situation.    Skills include assertiveness, communication, conflict management, problem-solving, relaxation, etc.    Solution-Focused Therapy    Explore patterns in patient's relationships and discuss options for new behaviors.    Explore patterns in patient's actions and choices and discuss options for new behaviors.    Cognitive-behavioral Therapy    Discuss common cognitive distortions, identify them in patient's life.    Explore ways to challenge, replace, and act against these cognitions.    Acceptance and Commitment Therapy    Explore and  identify important values in patient's life.    Discuss ways to commit to behavioral activation around these values.    Psychodynamic psychotherapy    Discuss patient's emotional dynamics and issues and how they impact behaviors.    Explore patient's history of relationships and how they impact present behaviors.    Explore how to work with and make changes in these schemas and patterns.    Narrative Therapy    Explore the patient's story of his/her life from his/her perspective.    Explore alternate ways of understanding their experience, identifying exceptions, developing new themes.    Interpersonal Psychotherapy    Explore patterns in relationships that are effective or ineffective at helping patient reach their goals, find satisfying experience.    Discuss new patterns or behaviors to engage in for improved social functioning.    Behavioral Activation    Discuss steps patient can take to become more involved in meaningful activity.    Identify barriers to these activities and explore possible solutions.    Mindfulness-Based Strategies    Discuss skills based on development and application of mindfulness.    Skills drawn from compassion-focused therapy, dialectical behavior therapy, mindfulness-based stress reduction, mindfulness-based cognitive therapy, etc.      Patient has reviewed and agreed to the above plan.      Joseph Murillo Psy.D, LP   Behavioral Health Clinician   -[facility]     1/19/2023  Answers for HPI/ROS submitted by the patient on 2/28/2023  If you checked off any problems, how difficult have these problems made it for you to do your work, take care of things at home, or get along with other people?: Very difficult  PHQ9 TOTAL SCORE: 6

## 2023-03-22 RX ORDER — TAMSULOSIN HYDROCHLORIDE 0.4 MG/1
0.4 CAPSULE ORAL DAILY
Qty: 90 CAPSULE | Refills: 0 | Status: SHIPPED | OUTPATIENT
Start: 2023-03-22 | End: 2023-06-14

## 2023-03-22 NOTE — TELEPHONE ENCOUNTER
tamsulosin (FLOMAX) 0.4 MG capsule      Last Written Prescription Date:  3/3/22  Last Fill Quantity: 90,   # refills: 3  Last Office Visit : 2/22/23  Future Office visit:  none    Routing refill request to provider for review/approval because:  Blood pressure out of range     90d elpidio refill sent, routed to clinic staff for follow up

## 2023-03-23 ENCOUNTER — TELEPHONE (OUTPATIENT)
Dept: ONCOLOGY | Facility: CLINIC | Age: 58
End: 2023-03-23
Payer: MEDICARE

## 2023-03-23 ENCOUNTER — TELEPHONE (OUTPATIENT)
Dept: ENDOCRINOLOGY | Facility: CLINIC | Age: 58
End: 2023-03-23
Payer: MEDICARE

## 2023-03-23 NOTE — TELEPHONE ENCOUNTER
Spoke w/ Pt: Asking clinic to call [facility] and explain PCP and Frannie Vasquez has provided information.     [facility]: [phone] #[id number]  #[phone]  Spoke w/ Tamela Fernando at [facility]. Asks that we dispose of forms.   Provider notified.   Abby Cespedes RN on 3/23/2023 at 1:03 PM

## 2023-03-23 NOTE — TELEPHONE ENCOUNTER
Forms placed with Dr Gonzalez for review.   Abby Cespedes RN on 3/23/2023 at 12:40 PM

## 2023-03-24 NOTE — TELEPHONE ENCOUNTER
Form placed in provider folder to answer question that is out of scope, sign, and date.    Alisha Conklin CMA on 3/24/2023 at 10:07 AM

## 2023-03-24 NOTE — TELEPHONE ENCOUNTER
Writer called [employer] and let them know at this point patient should be fine and have no work restrictions. Writer also relayed last appointment was 2.15.23 and future appointment on 5.17.23 for scar evaluation. Caller and no further questions.    Lili KULKARNI RN

## 2023-03-27 DIAGNOSIS — N18.31 CHRONIC KIDNEY DISEASE, STAGE 3A (H): ICD-10-CM

## 2023-03-29 ENCOUNTER — OFFICE VISIT (OUTPATIENT)
Dept: OPHTHALMOLOGY | Facility: CLINIC | Age: 58
End: 2023-03-29
Attending: OPHTHALMOLOGY
Payer: MEDICARE

## 2023-03-29 DIAGNOSIS — E11.3313 TYPE 2 DIABETES MELLITUS WITH MODERATE NONPROLIFERATIVE RETINOPATHY OF BOTH EYES AND MACULAR EDEMA, UNSPECIFIED WHETHER LONG TERM INSULIN USE (H): Primary | ICD-10-CM

## 2023-03-29 PROCEDURE — 92134 CPTRZ OPH DX IMG PST SGM RTA: CPT | Performed by: OPHTHALMOLOGY

## 2023-03-29 PROCEDURE — G0463 HOSPITAL OUTPT CLINIC VISIT: HCPCS | Mod: 25 | Performed by: OPHTHALMOLOGY

## 2023-03-29 PROCEDURE — 92235 FLUORESCEIN ANGRPH MLTIFRAME: CPT | Performed by: OPHTHALMOLOGY

## 2023-03-29 PROCEDURE — 99213 OFFICE O/P EST LOW 20 MIN: CPT | Mod: 24 | Performed by: OPHTHALMOLOGY

## 2023-03-29 RX ORDER — MULTIVITAMIN WITH FOLIC ACID 400 MCG
TABLET ORAL
Qty: 90 TABLET | Refills: 0 | Status: SHIPPED | OUTPATIENT
Start: 2023-03-29 | End: 2023-06-26

## 2023-03-29 ASSESSMENT — CONF VISUAL FIELD
OD_NORMAL: 1
OS_NORMAL: 1
OS_INFERIOR_NASAL_RESTRICTION: 0
OD_SUPERIOR_NASAL_RESTRICTION: 0
OD_INFERIOR_TEMPORAL_RESTRICTION: 0
OS_SUPERIOR_NASAL_RESTRICTION: 0
OS_SUPERIOR_TEMPORAL_RESTRICTION: 0
OS_INFERIOR_TEMPORAL_RESTRICTION: 0
OD_INFERIOR_NASAL_RESTRICTION: 0
OD_SUPERIOR_TEMPORAL_RESTRICTION: 0

## 2023-03-29 ASSESSMENT — EXTERNAL EXAM - LEFT EYE: OS_EXAM: PERIOCULAR ECCHYMOSIS

## 2023-03-29 ASSESSMENT — TONOMETRY
OD_IOP_MMHG: 19
OS_IOP_MMHG: 17
IOP_METHOD: TONOPEN

## 2023-03-29 ASSESSMENT — VISUAL ACUITY
OD_SC: 20/25
OS_SC: 20/25
METHOD: SNELLEN - LINEAR

## 2023-03-29 ASSESSMENT — CUP TO DISC RATIO
OD_RATIO: 0.3
OS_RATIO: 0.4

## 2023-03-29 ASSESSMENT — SLIT LAMP EXAM - LIDS
COMMENTS: SMALL PAPILLOMA ALONG LL MARGIN, NON CONCERNING
COMMENTS: NORMAL

## 2023-03-29 ASSESSMENT — EXTERNAL EXAM - RIGHT EYE: OD_EXAM: NORMAL

## 2023-03-29 NOTE — NURSING NOTE
Chief Complaints and History of Present Illnesses   Patient presents with     Diabetic Retinopathy Follow Up     Chief Complaint(s) and History of Present Illness(es)     Diabetic Retinopathy Follow Up            Laterality: both eyes    Onset: 4 weeks ago          Comments    Pt. States that there has been no change in VA BE. No pain BE. Has had some itching BE. No flashes or floaters BE.   Yvonne Dickinson COT 12:57 PM March 29, 2023

## 2023-03-29 NOTE — PROGRESS NOTES
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 Patient on insulin.    Interval History: s/p CEIOL left eye 1/3/22    Retinal Imaging:  OCT 03/29/23   RE: central Diabetic macular edema improved. Almost resolved. good foveal contour, - better  LE: slightly increased Diabetic macular edema, mild Epiretinal membrane, normal foveal contour with trace IRF     fluorescein angiography 03/29/23 both eyes normal AV and choroidal filling ou. Staining laser scars. No obvious NVE, mild late macula leakage. peripheral leakage and capillary non perfusion.     Optos consistent with clinical exam    Assessment & Plan:    # status post Cataract extraction intraocular lens right eye   Right eye: 01/24/23; left eye 01/3/23  -IOP WNL today  Retina detachment and endophthalmitis precautions were discussed with the patient (increased blurry vision, drainage, new flashes, floaters or a curtain in the visual field) and was asked to return if any of the those occur    #T2DM   - HbA1c 6.0% (1/2022)  - Blood pressure (<120/80) and blood glucose (HbA1c <7.0, ~6.5 today) control discussed with patient. Patient advised that failure to adequately control each may lead to vision loss. The patient expressed understanding.    # Diabetic macular edema both eyes   - status post avastin in both eyes; Switch to Eylea 4/24/19 with improvement of Diabetic macular edema   - mild DME each eye (right eye slightly worse, left eye improved with injections)   - watching closely right eye   - last  Eylea left eye 12.21.22 (10w prior)  Patient prefers T&E eylea left eye    -Inject Avastin OD for worsening edema 03/02/23  -Monitor OS as no significant worsening fluid 03/02/23 03/29/23 improved Diabetic macular edema right eye; slightly increased left eye   Patient prefers to hold inj left eye and reassess in 4 weeks    # Proliferative diabetic retinopathy both eyes   # pre-proliferative diabetic retinopathy right eye    # new vitreous hemorrhage left eye 10/12/22   Status post  Eylea inj left eye   Status post  Panretinal laser photocoagulation (PRP) 9.28.22 left eye and Status post scatter PRP right eye 11/02/22     # Dry eye syndrome   Left eye worse than right eye   warm compresses and artificial tears  As needed  - punctal plugs previously left eye - reports this is better     # Status post liver transplant  - tx 11/2019  - severe anemia; s/p transfusion - anemia much improved   - being seen by his primary team    PLAN  Follow up in 4 weeks with Optical Coherence Tomography, no dilation; possible Eylea inj both eyes   ~~~~~~~~~~~~~~~~~~~~~~~~~~~~~~~~~~   Complete documentation of historical and exam elements from today's encounter can be found in the full encounter summary report (not reduplicated in this progress note).  I personally obtained the chief complaint(s) and history of present illness.  I confirmed and edited as necessary the review of systems, past medical/surgical history, family history, social history, and examination findings as documented by others; and I examined the patient myself.  I personally reviewed the relevant tests, images, and reports as documented above.  I personally reviewed the ophthalmic test(s) associated with this encounter, agree with the interpretation(s) as documented by the resident/fellow, and have edited the corresponding report(s) as necessary.   I formulated and edited as necessary the assessment and plan and discussed the findings and management plan with the  patient and family and No resident or fellow assisted with the procedures performed.  I performed the procedures myself.    Enriqueta Martin MD   of Ophthalmology.  Retina Service   Department of Ophthalmology and Visual Neurosciences   [facility]  Phone: [phone]   Fax: [phone]    24-Jun-2021 14:00

## 2023-03-30 ENCOUNTER — TELEPHONE (OUTPATIENT)
Dept: NEPHROLOGY | Facility: CLINIC | Age: 58
End: 2023-03-30

## 2023-03-30 ENCOUNTER — TELEPHONE (OUTPATIENT)
Dept: GASTROENTEROLOGY | Facility: CLINIC | Age: 58
End: 2023-03-30

## 2023-03-30 ENCOUNTER — LAB (OUTPATIENT)
Dept: LAB | Facility: CLINIC | Age: 58
End: 2023-03-30
Payer: MEDICARE

## 2023-03-30 DIAGNOSIS — N18.32 STAGE 3B CHRONIC KIDNEY DISEASE (CKD) (H): ICD-10-CM

## 2023-03-30 LAB
ALBUMIN SERPL BCG-MCNC: 4.1 G/DL (ref 3.5–5.2)
ANION GAP SERPL CALCULATED.3IONS-SCNC: 10 MMOL/L (ref 7–15)
BUN SERPL-MCNC: 29.1 MG/DL (ref 8–23)
CALCIUM SERPL-MCNC: 9.2 MG/DL (ref 8.6–10)
CHLORIDE SERPL-SCNC: 103 MMOL/L (ref 98–107)
CREAT SERPL-MCNC: 1.8 MG/DL (ref 0.67–1.17)
DEPRECATED HCO3 PLAS-SCNC: 26 MMOL/L (ref 22–29)
GFR SERPL CREATININE-BSD FRML MDRD: 43 ML/MIN/1.73M2
GLUCOSE SERPL-MCNC: 204 MG/DL (ref 70–99)
PHOSPHATE SERPL-MCNC: 3.4 MG/DL (ref 2.5–4.5)
POTASSIUM SERPL-SCNC: 4.5 MMOL/L (ref 3.4–5.3)
SODIUM SERPL-SCNC: 139 MMOL/L (ref 136–145)

## 2023-03-30 PROCEDURE — 80069 RENAL FUNCTION PANEL: CPT | Performed by: PATHOLOGY

## 2023-03-30 PROCEDURE — 36415 COLL VENOUS BLD VENIPUNCTURE: CPT | Performed by: PATHOLOGY

## 2023-03-30 NOTE — TELEPHONE ENCOUNTER
Spoke with Rosi at [facility] and informed her that writer left a message for pt in February to have the disability forms faxed to PCP. Our department does not have the forms anymore.

## 2023-03-30 NOTE — TELEPHONE ENCOUNTER
[facility] Call Center    Phone Message    May a detailed message be left on voicemail: yes     Reason for Call: Other: Bam from [facility] called in regards to a disability form document that he faxed in for the pt. He would like to know the status on that. Please call him back at  [phone]. Thank you.    Action Taken: Other: hepa    Travel Screening: Not Applicable

## 2023-03-30 NOTE — TELEPHONE ENCOUNTER
[facility] Call Center    Phone Message    May a detailed message be left on voicemail: yes     Reason for Call: Other: . Bam with [employer] is calling to see if the clinic received the disability forms that he faxed on 3/23/23- please call Bam at [phone], thank you     Action Taken: Message routed to:  [facility]    Travel Screening: Not Applicable

## 2023-04-03 ENCOUNTER — TELEPHONE (OUTPATIENT)
Dept: CARDIOLOGY | Facility: CLINIC | Age: 58
End: 2023-04-03
Payer: MEDICARE

## 2023-04-03 NOTE — TELEPHONE ENCOUNTER
[facility] Call Center    Phone Message    May a detailed message be left on voicemail: yes     Reason for Call: Other: Donna with [employer] called in to check status of forms that were sent over in regards to the pt's long term disibilty. Please reach out to [phone] with claim number [id number].     Action Taken: Other: Cardiology    Travel Screening: Not Applicable     Thank you!  [facility]

## 2023-04-04 NOTE — TELEPHONE ENCOUNTER
Signed form received and faxed to [facility], fax # [phone]. Successful transmission verified via rightfax. Copy of forms placed in family file, original forms mailed to patient's home address on file.    Becky Piper RN on 4/4/2023 at 1:50 PM

## 2023-04-05 ENCOUNTER — TELEPHONE (OUTPATIENT)
Dept: FAMILY MEDICINE | Facility: CLINIC | Age: 58
End: 2023-04-05

## 2023-04-05 ENCOUNTER — VIRTUAL VISIT (OUTPATIENT)
Dept: FAMILY MEDICINE | Facility: CLINIC | Age: 58
End: 2023-04-05
Payer: MEDICARE

## 2023-04-05 DIAGNOSIS — F33.0 MILD RECURRENT MAJOR DEPRESSION (H): ICD-10-CM

## 2023-04-05 DIAGNOSIS — F41.9 ANXIETY: ICD-10-CM

## 2023-04-05 DIAGNOSIS — R53.83 FATIGUE, UNSPECIFIED TYPE: Primary | ICD-10-CM

## 2023-04-05 PROCEDURE — 99214 OFFICE O/P EST MOD 30 MIN: CPT | Mod: VID | Performed by: FAMILY MEDICINE

## 2023-04-05 NOTE — PROGRESS NOTES
"Virtual Visit Details      How would you like to obtain your AVS? MyChart  If the video visit is dropped, the invitation should be resent by: in epic  Will anyone else be joining your video visit? No        Assessment & Plan     Fatigue, unspecified type  Cont w/ current team of MD's summarized in Dr Brunner notes, scanned in Feb 2023/rvwd in today visit    Mild recurrent major depression (H)  Cont w/ psychology    Anxiety  Same, their notes rvwd    Form done, scanned, sent to pt, faxed to [employer]        34 minutes spent by me on the date of the encounter doing chart review, history and exam, documentation and further activities per the note    BMI:   Estimated body mass index is 30.42 kg/m  as calculated from the following:    Height as of 2/22/23: 1.753 m (5' 9.02\").    Weight as of 3/20/23: 93.5 kg (206 lb 1.8 oz).   I encourage pt to f/u w/ me when in [facility] [place] in spring for other appt  Lamin Ortiz MD  [facility] [place]    Nathaly Bhatt is a 58 year old, presenting for the following health issues:  Video Visit (Disability paperwork)         View : No data to display.              HPI No acute c/o  Recently saw Dr Brunner for work disability see that and Dr Villarreal signed forms both in media tabs, rvwd today, [employer] sent another form (done/scanned by me today) asking specifically about physical and cognitive tasks, which I answered he is unable to do.     To review his case, discuss his status, other MD's [employer] notes, and do the form, 34 minute visit    He has a very hard time maintaining focus for mental tasks, nor can he concentrate long enough to routinely accomplish tasks. Physically he is extremely fatigued, needs to take frequent breaks, and has back pain which limits his lifting.    Past Medical History:   Diagnosis Date     Anemia 2013     Arthritis      BPH (benign prostatic hyperplasia)      CAD (coronary artery disease) 4/1/2019     Cholelithiasis      Conductive hearing " loss 08/16/2017     Depressive disorder 1986    Suffer effects throughout life     Gastroesophageal reflux disease 12/01/2014     HCC (hepatocellular carcinoma) (H) 1/22/2019     History of diabetic retinopathy 07/2018     HTN (hypertension)      HTN (hypertension) 11/20/2019     Hyperlipidemia      Liver cirrhosis secondary to ESTRADA (H)      Liver transplanted (H) 11/11/2019     Portal vein thrombosis 4/11/2019     Type II diabetes mellitus (H)      Past Surgical History:   Procedure Laterality Date     BYPASS GRAFT ARTERY CORONARY N/A 7/14/2021    Procedure: median sternotomy, on cardiopulmonary bypass, CORONARY ARTERY BYPASS GRAFT (CABG) x2 with left greater saphenous vein endoscopic harvest and left internal mammery artery harvest;  Surgeon: Tom Zapata MD;  Location: [facility] OR     COLONOSCOPY      2015     COLONOSCOPY N/A 12/6/2019    Procedure: COLONOSCOPY, WITH POLYPECTOMY AND BIOPSY;  Surgeon: Adam Morton MD;  Location:  GI     CV CENTRAL VENOUS CATHETER PLACEMENT N/A 7/12/2021    Procedure: Central Venous Catheter Placement;  Surgeon: Fermin Polanco MD;  Location:  HEART CARDIAC CATH LAB     CV CORONARY ANGIOGRAM N/A 7/12/2021    Procedure: Coronary Angiogram;  Surgeon: Fermin Polanco MD;  Location:  HEART CARDIAC CATH LAB     CV HEART CATHETERIZATION WITH POSSIBLE INTERVENTION N/A 2/26/2019    Procedure: CORS;  Surgeon: Jagdish Hoyt MD;  Location:  HEART CARDIAC CATH LAB     CV INTRA AORTIC BALLOON N/A 7/12/2021    Procedure: Intra Aortic Balloon Pump Insertion;  Surgeon: Fermin Polanco MD;  Location:  HEART CARDIAC CATH LAB     ESOPHAGOSCOPY, GASTROSCOPY, DUODENOSCOPY (EGD), COMBINED N/A 11/17/2016    Procedure: COMBINED ESOPHAGOSCOPY, GASTROSCOPY, DUODENOSCOPY (EGD);  Surgeon: Santi Rosas MD;  Location:  GI     ESOPHAGOSCOPY, GASTROSCOPY, DUODENOSCOPY (EGD), COMBINED N/A 11/17/2017    Procedure: COMBINED  ESOPHAGOSCOPY, GASTROSCOPY, DUODENOSCOPY (EGD);  EGD;  Surgeon: Santi Rosas MD;  Location:  GI     ESOPHAGOSCOPY, GASTROSCOPY, DUODENOSCOPY (EGD), COMBINED N/A 12/28/2018    Procedure: EGD;  Surgeon: Santi Rosas MD;  [facility]     ESOPHAGOSCOPY, GASTROSCOPY, DUODENOSCOPY (EGD), COMBINED N/A 12/6/2019    Procedure: ESOPHAGOGASTRODUODENOSCOPY, WITH BIOPSY;  Surgeon: Adam Morton MD;  Location:  GI     ESOPHAGOSCOPY, GASTROSCOPY, DUODENOSCOPY (EGD), COMBINED N/A 2/13/2020    Procedure: ESOPHAGOGASTRODUODENOSCOPY (EGD);  Surgeon: Santi Rosas MD;  Location:  GI     HEAD & NECK SURGERY      12/2017 at [facility].      IMPLANT GOLD WEIGHT EYELID Right 11/16/2017    Procedure: IMPLANT WEIGHT EYELID;  Right Upper Eyelid Weight, right tarsal strip lower eyelid;  Surgeon: Milana Malave MD;  [facility]     IR CHEMO EMBOLIZATION  1/22/2019     KNEE SURGERY Left      ORTHOPEDIC SURGERY       PAROTIDECTOMY, RADICAL NECK DISSECTION Right 11/2/2017    Procedure: PAROTIDECTOMY, RADICAL NECK DISSECTION;  Right Superfacial Parotidectomy , Facial nerve repair. with [facility] facial nerve monitor.;  Surgeon: Asiya Morgan MD;  Location: [facility] OR     PHACOEMULSIFICATION CLEAR CORNEA WITH STANDARD INTRAOCULAR LENS IMPLANT Right 1/24/2023    Procedure: PHACOEMULSIFICATION, COMPLEX CATARACT, WITH INTRAOCULAR LENS IMPLANT WITH TRYPAN RIGHT EYE;  Surgeon: Enriqueta Martin MD;  Location: [facility] OR     PHACOEMULSIFICATION WITH STANDARD INTRAOCULAR LENS IMPLANT Left 1/3/2023    Procedure: PHACOEMULSIFICATION, COMPLEX CATARACT, WITH STANDARD INTRAOCULAR LENS IMPLANT INSERTION LEFT EYE;  Surgeon: Enriqueta Martin MD;  [facility]     PICC INSERTION Left 11/06/2017    4fr SL BioFlo PICC, 44cm in the L basilic vein w/ tip in the low SVC     RETURN LIVER TRANSPLANT N/A 11/12/2019    Procedure: Exploratory laparotomy, hematoma evacuation, abdominal washout;  Surgeon: Richard  MD Александр;  Location: [facility] OR     TRANSPLANT LIVER RECIPIENT  DONOR N/A 2019    Procedure: TRANSPLANT, LIVER, RECIPIENT,  DONOR;  Surgeon: Александр Ramos MD;  Location: [facility] OR     VASCULAR SURGERY       Current Outpatient Medications   Medication     acetaminophen (TYLENOL) 325 MG tablet     ammonium lactate (AMLACTIN) 12 % external cream     aspirin (SM ASPIRIN ADULT LOW STRENGTH) 81 MG EC tablet     BD VIKTORIA U/F 32G X 4 MM insulin pen needle     benzoyl peroxide 5 % external liquid     Continuous Blood Gluc Sensor (FREESTYLE CLAUDIA 2 SENSOR) MISC     empagliflozin (JARDIANCE) 10 MG TABS tablet     insulin aspart (NOVOLOG PEN) 100 UNIT/ML pen     insulin degludec (TRESIBA FLEXTOUCH) 100 UNIT/ML pen     ketoconazole (NIZORAL) 2 % external shampoo     ketorolac (ACULAR) 0.5 % ophthalmic solution     lamiVUDine (EPIVIR) 100 MG tablet     lisinopril (ZESTRIL) 5 MG tablet     metFORMIN (GLUCOPHAGE XR) 500 MG 24 hr tablet     metoprolol succinate ER (TOPROL XL) 25 MG 24 hr tablet     Multiple Vitamin (TAB-A-GLADIS) TABS     mycophenolate (GENERIC EQUIVALENT) 250 MG capsule     order for DME     pantoprazole (PROTONIX) 40 MG EC tablet     prednisoLONE acetate (PRED FORTE) 1 % ophthalmic suspension     predniSONE (DELTASONE) 5 MG tablet     rosuvastatin (CRESTOR) 5 MG tablet     tamsulosin (FLOMAX) 0.4 MG capsule     Vitamin D3 50 mcg (2000 units) tablet     Current Facility-Administered Medications   Medication     aflibercept (EYLEA) injection prefilled syringe 2 mg     aflibercept (EYLEA) injection prefilled syringe 2 mg     Allergies   Allergen Reactions     Codeine Other (See Comments)     Cannot take due to liver  Cannot tolerate oral narcotics     Seasonal Allergies      Sneezing, coughing, runny and itchy eyes             Review of Systems         Objective    Vitals - Patient Reported  Systolic (Patient Reported): (!) 150  Diastolic (Patient Reported): 80  Weight (Patient Reported): 93.4 kg (206  "lb)  Height (Patient Reported): 175.3 cm (5' 9\")  BMI (Based on Pt Reported Ht/Wt): 30.42  Pulse (Patient Reported): 90  Pain Score: Moderate Pain (5)  Pain Loc: Mid Back        Physical Exam   GENERAL: Fatigued, alert and no distress  EYES: Eyes grossly normal to inspection.  No discharge or erythema, or obvious scleral/conjunctival abnormalities.  RESP: No audible wheeze, cough, or visible cyanosis.  No visible retractions or increased work of breathing.    SKIN: Visible skin clear. No significant rash, abnormal pigmentation or lesions.  NEURO: Cranial nerves grossly intact.  Mentation and speech appropriate for age.  PSYCH: awake, alert, able to converse coherently, he did sit during the visit to rest, he did take breaks while I reviewed his chart and other MD notes/forms done        Video-Visit Details    Type of service:  Video Visit   Video Start Time: 2 pm  Video End Time:2:30 pm    Originating Location (pt. Location): Home  Distant Location (provider location):  On-site  Platform used for Video Visit: Odette"

## 2023-04-05 NOTE — NURSING NOTE
Is the patient currently in the state of [place]? YES    Visit mode:VIDEO    If the visit is dropped, the patient can be reconnected by: VIDEO VISIT: Send to e-mail at: [email or web address]    Will anyone else be joining the visit? NO      How would you like to obtain your AVS? MyChart    Are changes needed to the allergy or medication list? NO    Reason for visit: Disability paperwork    Concetta SALMON

## 2023-04-05 NOTE — PATIENT INSTRUCTIONS
Thank you for visiting the Primary Care Center today at the [facility]! The following is some information about our clinic:     Primary Care Center Frequently-Asked Questions    (1) How do I schedule appointments at the [facility]?     Primary Care--to schedule or make changes to an existing appointment, please call our primary care line at [phone].    Labs--to schedule a lab appointment at the [facility] you can use [employer] or call [phone]. If you have a [place] location that is closer to home, you can reach out to that location for scheduling options.     Imaging--if you need to schedule a CT, X-ray, MRI, ultrasound, or other imaging study you can call [phone] to schedule at the [facility] or any other [facility] imaging location.     Referrals--if a referral to another specialty was ordered you can expect a phone call from their scheduling team. If you have not heard from them in a week, please call us or send us a [employer] message to check the status or get a scheduling number. Please note that this only applies to internal [facility] referrals. If the referral is external you would need to contact their office for scheduling.     (2) I have a question about my visit, who do I contact?     You can call us at the primary care line at [phone] to ask questions about your visit. You can also send a secure message through [employer], which is reviewed by clinic staff. Please note that [employer] messages have a twenty-four to forty-eight business hour turnaround time and should not be used for urgent concerns.    (3) How will I get the results of my tests?    If you are signed up for [employer]t all tests will be released to you within twenty-four hours of resulting. Please allow three to five days for your doctor to review your results and place a note interpreting the results. If you do not have [employer] you will receive your  results through mail seven to ten business days following the return of the tests. Please note that if there should be any urgent or concerning results that your doctor or their registered nurse will reach out to you the same day as the tests come back. If you have follow up questions about your results or would like to discuss the results in detail please schedule a follow up with your provider either in person or virtually.     (4) How do I get refills of my prescriptions?     You should always first contact your pharmacy for refills of your medications. If submitting a refill request on [employer], please be sure to submit the request only once--repeat requests can cause delays in refill. If you are requesting a NEW medication or a medication related to new symptoms you will need to schedule an appointment with a provider prior to approval. Please note: Routine medication refills have up to one to three business day turnaround whereas controlled substances refills have up to five to seven business day turnaround.    (5) I have new symptoms, what do I do?     If you are having an immediate medical emergency, you should dial 911 for assistance.   For anything urgent that needs to be seen within a few hours to one day you should visit a local urgent care for assistance.  For non-urgent symptoms that need to be seen within a few days to a week you can schedule with an available provider in primary care by going to "[employer] or calling [phone].   If you are not sure how serious your symptoms are or you would like to receive medical advice you can always call [phone] to speak with a triage nurse.

## 2023-04-18 ENCOUNTER — VIRTUAL VISIT (OUTPATIENT)
Dept: BEHAVIORAL HEALTH | Facility: CLINIC | Age: 58
End: 2023-04-18
Payer: MEDICARE

## 2023-04-18 DIAGNOSIS — Z94.4 STATUS POST LIVER TRANSPLANTATION (H): ICD-10-CM

## 2023-04-18 DIAGNOSIS — F31.31 BIPOLAR 1 DISORDER, DEPRESSED, MILD (H): Primary | ICD-10-CM

## 2023-04-18 DIAGNOSIS — B19.10 HEPATITIS B: ICD-10-CM

## 2023-04-18 PROCEDURE — 90832 PSYTX W PT 30 MINUTES: CPT | Mod: VID | Performed by: PSYCHOLOGIST

## 2023-04-18 RX ORDER — LAMIVUDINE 100 MG/1
100 TABLET, FILM COATED ORAL DAILY
Qty: 90 TABLET | Refills: 3 | Status: ON HOLD | OUTPATIENT
Start: 2023-04-18 | End: 2023-08-16

## 2023-04-18 ASSESSMENT — ENCOUNTER SYMPTOMS
CHILLS: 0
WEIGHT GAIN: 1
ALTERED TEMPERATURE REGULATION: 1
FEVER: 0
POLYDIPSIA: 0
FATIGUE: 1
POLYPHAGIA: 0
DECREASED APPETITE: 0
INCREASED ENERGY: 0
WEIGHT LOSS: 0
HALLUCINATIONS: 0
NIGHT SWEATS: 0

## 2023-04-18 NOTE — PROGRESS NOTES
[facility]: Integrated Behavioral Health  April 18, 2023        Behavioral Health Clinician Progress Note    Patient Name: Frandy Workman           Service Type: Video visit      Service Location:  Video visit      Session Start Time: 3:00  Session End Time:  3:30      Session Length: 16 - 37      Attendees: Patient    Visit Activities (Refresh list every visit): [facility] Only     Service Modality:  Video Visit:      Provider verified identity through the following two step process.  Patient provided:  Patient photo and Patient is known previously to provider    Telemedicine Visit: The patient's condition can be safely assessed and treated via synchronous audio and visual telemedicine encounter.      Reason for Telemedicine Visit: Services only offered telehealth    Originating Site (Patient Location): Patient's home    Distant Site (Provider Location): Provider Remote Setting- Home Office    Consent:  The patient/guardian has verbally consented to: the potential risks and benefits of telemedicine (video visit) versus in person care; bill my insurance or make self-payment for services provided; and responsibility for payment of non-covered services.     Patient would like the video invitation sent by:  My Chart    Mode of Communication:  Video Conference via [facility]    Distant Location (Provider):  Off-site    As the provider I attest to compliance with applicable laws and regulations related to telemedicine.      Diagnostic Assessment Date:  12/03/2020  Treatment Plan Review Date:  7/18/2023  See Flowsheets for today's PHQ-9 and LIZZETTE-7 results  Previous PHQ-9:       1/18/2023    10:30 AM 2/28/2023     2:58 PM 3/20/2023     9:10 AM   PHQ-9 SCORE   PHQ-9 Total Score MyChart 8 (Mild depression) 6 (Mild depression)    PHQ-9 Total Score 8 6 11     Previous LIZZETTE-7:       12/29/2020     1:32 PM 4/7/2021    12:34 PM 9/30/2021     1:45 PM   LIZZETTE-7 SCORE   Total Score 8 (mild anxiety) 9 (mild  anxiety)    Total Score 8 9 10       Extended Session (60+ minutes): No  Interactive Complexity: No  Crisis: No    Treatment Objective(s) Addressed in This Session:  Target Behavior(s): disease management/lifestyle changes  related to adaptive approaches to managing anxious distress and mood difficulties    Depressed mood: Increase interest, pleasure in doing things.      Current Stressors / Issues:  [facility] met with Miller today for a follow-up. Reports mood is stable, doing well overall. Continues to walk each day, as we discussed, which he notes is a positive for him. States he continues to follow through with his various medical appointments and doing his best to keep up with his medical needs. Also notes accomplishment from finishing his taxes and states intent to work on organizing his bills next. Continued to provide Miller support and review the steps he is taking to maintain his stable mood. Helped Miller prioritize tasks and we looked at ways he could continue working on his overall medical and mental health, through light movement/exercise, focusing on what he can control.     Answers for HPI/ROS submitted by the patient on 7/12/2022  If you checked off any problems, how difficult have these problems made it for you to do your work, take care of things at home, or get along with other people?: Somewhat difficult  PHQ9 TOTAL SCORE: 4    Answers for HPI/ROS submitted by the patient on 9/8/2022  If you checked off any problems, how difficult have these problems made it for you to do your work, take care of things at home, or get along with other people?: Somewhat difficult  PHQ9 TOTAL SCORE: 3    Answers for HPI/ROS submitted by the patient on 11/21/2022  If you checked off any problems, how difficult have these problems made it for you to do your work, take care of things at home, or get along with other people?: Very difficult  PHQ9 TOTAL SCORE: 4      Answers for HPI/ROS submitted by the patient on 1/18/2023  If you  checked off any problems, how difficult have these problems made it for you to do your work, take care of things at home, or get along with other people?: Extremely difficult  PHQ9 TOTAL SCORE: 8        Progress on Treatment Objective(s) / Homework:  Stable - ACTION (Actively working towards change); Intervened by reinforcing change plan / affirming steps taken      Solution-Focused Therapy    Explore patterns in patient's relationships and discuss options for new behaviors.    Explore patterns in patient's actions and choices and discuss options for new behaviors.    Behavioral Activation    Discuss steps patient can take to become more involved in meaningful activity.    Identify barriers to these activities and explore possible solutions.      Answers for HPI/ROS submitted by the patient on 3/21/2022  If you checked off any problems, how difficult have these problems made it for you to do your work, take care of things at home, or get along with other people?: Not difficult at all  PHQ9 TOTAL SCORE: 6      Answers for HPI/ROS submitted by the patient on 2/8/2022  If you checked off any problems, how difficult have these problems made it for you to do your work, take care of things at home, or get along with other people?: Somewhat difficult  PHQ9 TOTAL SCORE: 4      Answers for HPI/ROS submitted by the patient on 4/7/2021   LIZZETTE 7 TOTAL SCORE: 9  If you checked off any problems, how difficult have these problems made it for you to do your work, take care of things at home, or get along with other people?: Very difficult  PHQ9 TOTAL SCORE: 7*    *No SI    Answers for HPI/ROS submitted by the patient on 10/13/2020   If you checked off any problems, how difficult have these problems made it for you to do your work, take care of things at home, or get along with other people?: Somewhat difficult  PHQ9 TOTAL SCORE: 8*  LIZZETTE 7 TOTAL SCORE: 6      *No SI     Answers for HPI/ROS submitted by the patient on 12/29/2020   If  you checked off any problems, how difficult have these problems made it for you to do your work, take care of things at home, or get along with other people?: Somewhat difficult  PHQ9 TOTAL SCORE: 5*  LIZZETTE 7 TOTAL SCORE: 8    *No SI     Answers for HPI/ROS submitted by the patient on 11/21/2022  If you checked off any problems, how difficult have these problems made it for you to do your work, take care of things at home, or get along with other people?: Very difficult  PHQ9 TOTAL SCORE: 4          Care Plan review completed: yes    Medication Review:  No changes to current psychiatric medication(s)     Current Outpatient Medications   Medication     acetaminophen (TYLENOL) 325 MG tablet     ammonium lactate (AMLACTIN) 12 % external cream     aspirin (SM ASPIRIN ADULT LOW STRENGTH) 81 MG EC tablet     BD VIKTORIA U/F 32G X 4 MM insulin pen needle     benzoyl peroxide 5 % external liquid     Continuous Blood Gluc Sensor (FREESTYLE CLAUDIA 2 SENSOR) MISC     empagliflozin (JARDIANCE) 10 MG TABS tablet     insulin aspart (NOVOLOG PEN) 100 UNIT/ML pen     insulin degludec (TRESIBA FLEXTOUCH) 100 UNIT/ML pen     ketoconazole (NIZORAL) 2 % external shampoo     ketorolac (ACULAR) 0.5 % ophthalmic solution     lamiVUDine (EPIVIR) 100 MG tablet     lisinopril (ZESTRIL) 5 MG tablet     metFORMIN (GLUCOPHAGE XR) 500 MG 24 hr tablet     metoprolol succinate ER (TOPROL XL) 25 MG 24 hr tablet     Multiple Vitamin (TAB-A-GLADIS) TABS     mycophenolate (GENERIC EQUIVALENT) 250 MG capsule     order for DME     pantoprazole (PROTONIX) 40 MG EC tablet     prednisoLONE acetate (PRED FORTE) 1 % ophthalmic suspension     predniSONE (DELTASONE) 5 MG tablet     rosuvastatin (CRESTOR) 5 MG tablet     tamsulosin (FLOMAX) 0.4 MG capsule     Vitamin D3 50 mcg (2000 units) tablet     Current Facility-Administered Medications   Medication     aflibercept (EYLEA) injection prefilled syringe 2 mg     aflibercept (EYLEA) injection prefilled syringe 2 mg          Medication Compliance:  Yes    Changes in Health Issues:   None reported    Chemical Use Review:   Substance Use: Chemical use reviewed, no active concerns identified      Tobacco Use: No current tobacco use.         Assessment: Current Emotional / Mental Status (status of significant symptoms):  Risk status (Self / Other harm or suicidal ideation)  Patient denies a history of suicidal ideation, suicide attempts, self-injurious behavior, homicidal ideation, homicidal behavior and and other safety concerns     Patient denies current fears or concerns for personal safety.  Patient denies current or recent suicidal ideation or behaviors.  Patient denies current or recent homicidal ideation or behaviors.  Patient denies current or recent self injurious behavior or ideation.  Patient denies other safety concerns.     A safety and risk management plan has not been developed at this time, however patient was encouraged to call [place] [phone] should there be a change in any of these risk factors.    [place] Suicide Severity Rating Scale (Short Version)      12/10/2019     5:00 PM 6/11/2020     9:18 PM 7/1/2020    11:00 AM 7/12/2021     2:30 PM 1/10/2022     9:28 PM 1/3/2023     6:31 AM 1/24/2023     6:22 AM   [place] Suicide Severity Rating (Short Version)   Over the past 2 weeks have you felt down, depressed, or hopeless? yes no no no no no no   Over the past 2 weeks have you had thoughts of killing yourself? no no no no no no    Have you ever attempted to kill yourself? no no no no no no    Q1 Wished to be Dead (Past Month) no     no    Q2 Suicidal Thoughts (Past Month) no     no    Q3 Suicidal Thought Method no     no    Q4 Suicidal Intent without Specific Plan no     no    Q5 Suicide Intent with Specific Plan no     no    Q6 Suicide Behavior (Lifetime) no     no    Level of Risk per Screen      low risk          Appearance:   Appropriate   Eye Contact:   Good   Psychomotor Behavior: TD evident    Attitude:   Cooperative  Interested Friendly Pleasant  Orientation:   All  Speech   Rate / Production: Normal/ Responsive   Volume:  Normal   Mood:    Depressed ; improving  Affect:    Appropriate    Thought Content:  Clear   Thought Form:  Goal Directed  Logical   Insight:    Good     Diagnoses:  1. Bipolar 1 disorder, depressed, mild (H)          Collateral Reports Completed:  Not Applicable    Plan: (Homework, other):  Continue with this writer for individual psychotherapy in 3 wks. He will continue to work on managing depression and anxiety through achievable behavioral activation ideas. Information about behavioral activation provided  Today; he plans to increase physical activity by walking each day, leading up to light exercise around 5-10 minutes.  No CD issues.     Joseph Murillo, LP  March 21, 2023          ______________________________________________________________________                                            Individual Treatment Plan    Patient's Name: Frandy Workman  YOB: 1965    Date of Creation: 3/1/2022  Date Treatment Plan Last Reviewed/Revised: 4/18/2023    DSM5 Diagnoses: 296.51 Bipolar I Disorder Current or Most Recent Episode Depressed, Mild or 300.02 (F41.1) Generalized Anxiety Disorder  Psychosocial / Contextual Factors: Post Solid Organ Tx  PROMIS (reviewed every 90 days): 4    Referral / Collaboration:  Referral to another professional/service is not indicated at this time..    Anticipated number of session for this episode of care: 4-6  Anticipation frequency of session: Every other week  Anticipated Duration of each session: 38-52 minutes  Treatment plan will be reviewed in 90 days or when goals have been changed.       MeasurableTreatment Goal(s) related to diagnosis / functional impairment(s)  Goal 1: Patient will experience a reduction in depressive symptoms along with a corresponding increase in positive emotion and life satisfaction.      Objective #A  "(Patient Action)    Patient will Increase interest, engagement, and pleasure in doing things.  Status: Continued - Date(s): 4/18/2023    Intervention(s)  Therapist will help patient identify pleasant and mastery oriented events that elicit positive, relaxed mood.    Objective #B  Patient will Decrease frequency and intensity of feeling down, depressed, hopeless.  Status: Continued - Date(s): 4/18/2023    Intervention(s)  Therapist will introduce patient to cognitive-behavioral and acceptance and commitment therapy topics aimed to help reduce depression and anxiety    Objective #C  Patient will Identify negative self-talk and behaviors: challenge core beliefs, myths, and actions  Improve concentration, focus, and mindfulness in daily activities .  Status: Continued - Date(s): COMPLETE    Intervention(s)  Therapist will help patient identify and manage negative self-talk and automatic thoughts; introduce patient to cognitive distortions; help patient develop cognitive diffusion techniques      Goal 2: Patient will experience a reduction in anxious symptoms, along with a corresponding increase in relaxed emotional states and life satisfaction.      Objective #A (Patient Action)  Patient will use cognitive-behavioral and thought diffusion strategies identified in therapy to challenge anxious thoughts.    Status: Continued - Date(s): COMPLETE    Intervention(s)  Therapist will utilize CBT and ACT ideas to help patient challenge anxious thoughts and reduce intensity/duration of anxious distress    Objective #B  Patient will use \"worry time\" each day for 15 minutes of scheduled worry and then defer obsessive or anxious thinking until the next structured worry time.    Status: Continued - Date(s): COMPLETE    Intervention(s)  Therapist will teach patient how to effectively utilize worry time and/or thought logs/journals each day and incorporate more relaxation behaviors into their routine.    Objective #C  Patient will " identify the stressors which contribute to feelings of anxiety  Patient will increase engagement in adaptive coping skills and recreational activities, such as exercise and healthy socialization, to manage distress.  Status: Continued - Date(s): COMPLETE    Intervention(s)  Therapist will help patient identify triggers/situational factors that contribute to anxiety and behavioral skills aimed to manage anxious distress.      Goal 3: Patiient will work toward adaptively managing bipolar-related depression and manic episodes      Objective #A (Patient Action)    Status: Continued - Date(s): COMPLETE    Patient will identify 2-3 signs or signals of emerging mood instability.    Intervention(s)  Therapist will provide educational materials on bipolar disorder and help identifying triggers for mood instability.    Objective #B  Patient will identify 2-3 strategies for more effectively managing Bipolar Disorder.    Status: Continued - Date(s): COMPLETE    Intervention(s)  Therapist will teach emotional recognition/identification. Skills aimed to effectively manage bipolar disorder.      Other Possible Therapeutic Intervention(s):    Psycho-education regarding mental health diagnoses and treatment options    Supportive Therapy    Provide affirmations, reflections, and establish working rapport    Emphasize and reflect on strength of therapeutic relationship    Skills training    Explore skills useful to client in current situation.    Skills include assertiveness, communication, conflict management, problem-solving, relaxation, etc.    Solution-Focused Therapy    Explore patterns in patient's relationships and discuss options for new behaviors.    Explore patterns in patient's actions and choices and discuss options for new behaviors.    Cognitive-behavioral Therapy    Discuss common cognitive distortions, identify them in patient's life.    Explore ways to challenge, replace, and act against these  cognitions.    Acceptance and Commitment Therapy    Explore and identify important values in patient's life.    Discuss ways to commit to behavioral activation around these values.    Psychodynamic psychotherapy    Discuss patient's emotional dynamics and issues and how they impact behaviors.    Explore patient's history of relationships and how they impact present behaviors.    Explore how to work with and make changes in these schemas and patterns.    Narrative Therapy    Explore the patient's story of his/her life from his/her perspective.    Explore alternate ways of understanding their experience, identifying exceptions, developing new themes.    Interpersonal Psychotherapy    Explore patterns in relationships that are effective or ineffective at helping patient reach their goals, find satisfying experience.    Discuss new patterns or behaviors to engage in for improved social functioning.    Behavioral Activation    Discuss steps patient can take to become more involved in meaningful activity.    Identify barriers to these activities and explore possible solutions.    Mindfulness-Based Strategies    Discuss skills based on development and application of mindfulness.    Skills drawn from compassion-focused therapy, dialectical behavior therapy, mindfulness-based stress reduction, mindfulness-based cognitive therapy, etc.      Patient has reviewed and agreed to the above plan.      Joseph Murillo Psy.D, LP   Behavioral Health Clinician   -[facility]     4/18/2023  Answers for HPI/ROS submitted by the patient on 2/28/2023  If you checked off any problems, how difficult have these problems made it for you to do your work, take care of things at home, or get along with other people?: Very difficult  PHQ9 TOTAL SCORE: 6

## 2023-04-18 NOTE — PROGRESS NOTES
Outcome for 23 9:25 AM: Data uploaded on [facility]  Yojana Wang MA  Outcome for 23 2:19 PM: Data obtained via [facility] website  Yojana Wang MA    Patient is showing 4/5 MNCM met. BP out range   Yojana Wang MA     Start time: 930  End time: 946  Provider location: off site- home  Patient location: off site- home.      HPI:     Frandy Workman is a 58 year old man here for follow up of type 2 diabetes complicated by nephropathy, retinopathy and neuropathy. 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  He reports that he feels good today and his glucose has been lower since we added metformin last visit and increased his insulin.  He has not had any low blood sugars.   High this month has been about 280, low was about 95 mg/dL.  He has been  trying to increase the fiber in his diet.      DM Regimen:  - Tresiba 36 units /day.  - Carbohydrate coverage to 1 unit:10 g of carbohydrate (5-10 units at a time).  - Correction Novolounit Novolo mg /dl >180    - Empagliflozin 10 mg daily (had JERONIMO/hypovolemias on higher dose).  - Metformin- started to get diarrhea from it.  500 mg bid.     Previous treatments:  Metformin- unsure why he went off. Kidneys?    Patient wears a camilo 2 sensor with an overall average of 157 mg/dL (CV 21%, TIR 81%, hypo 0%, severe hypo 0%) over the past 2 weeks. This is down significantly from 201 mg/dL at his last visit.  Recent glucose is as follows:         Diabetes Control:   Lab Results   Component Value Date    A1C 6.0 01/10/2022    A1C 6.8 2021    A1C 6.0 2020    A1C 6.3 2020    A1C 6.6 2018    A1C 6.5 2017    A1C 7.8 10/25/2016       Past Medical History:   Diagnosis Date     Anemia      Arthritis      BPH (benign prostatic hyperplasia)      CAD (coronary artery  disease) 4/1/2019     Cholelithiasis      Conductive hearing loss 08/16/2017     Depressive disorder 1986    Suffer effects throughout life     Gastroesophageal reflux disease 12/01/2014     HCC (hepatocellular carcinoma) (H) 1/22/2019     History of diabetic retinopathy 07/2018     HTN (hypertension)      HTN (hypertension) 11/20/2019     Hyperlipidemia      Liver cirrhosis secondary to ESTRADA (H)      Liver transplanted (H) 11/11/2019     Portal vein thrombosis 4/11/2019     Type II diabetes mellitus (H)        Past Surgical History:   Procedure Laterality Date     BYPASS GRAFT ARTERY CORONARY N/A 7/14/2021    Procedure: median sternotomy, on cardiopulmonary bypass, CORONARY ARTERY BYPASS GRAFT (CABG) x2 with left greater saphenous vein endoscopic harvest and left internal mammery artery harvest;  Surgeon: Tom Zapata MD;  [facility]     COLONOSCOPY      2015     COLONOSCOPY N/A 12/6/2019    Procedure: COLONOSCOPY, WITH POLYPECTOMY AND BIOPSY;  Surgeon: Adam Morton MD;  Location:  GI     CV CENTRAL VENOUS CATHETER PLACEMENT N/A 7/12/2021    Procedure: Central Venous Catheter Placement;  Surgeon: Fermin Polanco MD;  Location: [facility] CARDIAC CATH LAB     CV CORONARY ANGIOGRAM N/A 7/12/2021    Procedure: Coronary Angiogram;  Surgeon: Fermin Polanco MD;  Location: [facility] CARDIAC CATH LAB     CV HEART CATHETERIZATION WITH POSSIBLE INTERVENTION N/A 2/26/2019    Procedure: CORS;  Surgeon: Jagdish Hoyt MD;  Location: [facility] CARDIAC CATH LAB     CV INTRA AORTIC BALLOON N/A 7/12/2021    Procedure: Intra Aortic Balloon Pump Insertion;  Surgeon: Fermin Polanco MD;  Location: [facility] CARDIAC CATH LAB     ESOPHAGOSCOPY, GASTROSCOPY, DUODENOSCOPY (EGD), COMBINED N/A 11/17/2016    Procedure: COMBINED ESOPHAGOSCOPY, GASTROSCOPY, DUODENOSCOPY (EGD);  Surgeon: Santi Rosas MD;  Location:  GI     ESOPHAGOSCOPY, GASTROSCOPY,  DUODENOSCOPY (EGD), COMBINED N/A 11/17/2017    Procedure: COMBINED ESOPHAGOSCOPY, GASTROSCOPY, DUODENOSCOPY (EGD);  EGD;  Surgeon: Santi Rosas MD;  [facility]     ESOPHAGOSCOPY, GASTROSCOPY, DUODENOSCOPY (EGD), COMBINED N/A 12/28/2018    Procedure: EGD;  Surgeon: Santi Rosas MD;  [facility]     ESOPHAGOSCOPY, GASTROSCOPY, DUODENOSCOPY (EGD), COMBINED N/A 12/6/2019    Procedure: ESOPHAGOGASTRODUODENOSCOPY, WITH BIOPSY;  Surgeon: Adam Morton MD;  Location:  GI     ESOPHAGOSCOPY, GASTROSCOPY, DUODENOSCOPY (EGD), COMBINED N/A 2/13/2020    Procedure: ESOPHAGOGASTRODUODENOSCOPY (EGD);  Surgeon: Santi Rosas MD;  Location:  GI     HEAD & NECK SURGERY      12/2017 at [facility].      IMPLANT GOLD WEIGHT EYELID Right 11/16/2017    Procedure: IMPLANT WEIGHT EYELID;  Right Upper Eyelid Weight, right tarsal strip lower eyelid;  Surgeon: Milana Malave MD;  [facility]     IR CHEMO EMBOLIZATION  1/22/2019     KNEE SURGERY Left      ORTHOPEDIC SURGERY       PAROTIDECTOMY, RADICAL NECK DISSECTION Right 11/2/2017    Procedure: PAROTIDECTOMY, RADICAL NECK DISSECTION;  Right Superfacial Parotidectomy , Facial nerve repair. with [facility] facial nerve monitor.;  Surgeon: Asiya Morgan MD;  [facility]     PHACOEMULSIFICATION CLEAR CORNEA WITH STANDARD INTRAOCULAR LENS IMPLANT Right 1/24/2023    Procedure: PHACOEMULSIFICATION, COMPLEX CATARACT, WITH INTRAOCULAR LENS IMPLANT WITH TRYPAN RIGHT EYE;  Surgeon: Enriqueta Martin MD;  [facility]     PHACOEMULSIFICATION WITH STANDARD INTRAOCULAR LENS IMPLANT Left 1/3/2023    Procedure: PHACOEMULSIFICATION, COMPLEX CATARACT, WITH STANDARD INTRAOCULAR LENS IMPLANT INSERTION LEFT EYE;  Surgeon: Enriqueta Martin MD;  Location: [facility] OR     PICC INSERTION Left 11/06/2017    4fr SL BioFlo PICC, 44cm in the L basilic vein w/ tip in the low SVC     RETURN LIVER TRANSPLANT N/A 11/12/2019    Procedure: Exploratory  laparotomy, hematoma evacuation, abdominal washout;  Surgeon: Александр Ramos MD;  [facility]     TRANSPLANT LIVER RECIPIENT  DONOR N/A 2019    Procedure: TRANSPLANT, LIVER, RECIPIENT,  DONOR;  Surgeon: Александр Ramos MD;  [facility]     VASCULAR SURGERY         Family History   Problem Relation Age of Onset     Skin Cancer Mother      Cancer Mother      Diabetes Mother          3/2016     Cerebrovascular Disease Mother         Passed away in Feb of this year, 80 years old.     Thyroid Disease Mother      Depression Mother      Colon Cancer Father 60     Pancreatic Cancer Father 60     Prostate Cancer Father      Colorectal Cancer Father      Macular Degeneration Father      Cancer Father      Glaucoma Father      Skin Cancer Father      Asthma Sister         Had since birth     Thyroid Disease Sister      Depression Sister      Colorectal Cancer Maternal Grandmother      Cancer Maternal Grandmother      Substance Abuse Maternal Grandmother         Alcohol     Prostate Cancer Maternal Grandfather      Substance Abuse Maternal Grandfather         Alcohol     Colorectal Cancer Paternal Grandmother      Liver Disease No family hx of      Melanoma No family hx of      Anesthesia Reaction No family hx of      Deep Vein Thrombosis (DVT) No family hx of        Social History     Socioeconomic History     Marital status:      Highest education level: Bachelor's degree (e.g., BA, AB, BS)   Tobacco Use     Smoking status: Former     Packs/day: 6.00     Years: 30.00     Pack years: 180.00     Types: Cigars, Cigarettes     Start date: 2016     Quit date: 10/25/2017     Years since quittin.0     Smokeless tobacco: Former     Types: Chew     Quit date: 10/31/2017     Tobacco comments:     1 tin per week   Substance and Sexual Activity     Alcohol use: No     Alcohol/week: 0.0 standard drinks     Comment: quit 1996     Drug use: No     Sexual activity: Not Currently      Partners: Female     Birth control/protection: Condom   Other Topics Concern     Parent/sibling w/ CABG, MI or angioplasty before 65F 55M? Yes   Social History Narrative    [place] [facility] [place] [facility]     Social Determinants of Health     Intimate Partner Violence: Not At Risk     Fear of Current or Ex-Partner: No     Emotionally Abused: No     Physically Abused: No     Sexually Abused: No       Current Outpatient Medications   Medication     acetaminophen (TYLENOL) 325 MG tablet     ammonium lactate (AMLACTIN) 12 % external cream     aspirin (SM ASPIRIN ADULT LOW STRENGTH) 81 MG EC tablet     BD VIKTORIA U/F 32G X 4 MM insulin pen needle     benzoyl peroxide 5 % external liquid     Continuous Blood Gluc Sensor (FREESTYLE CLAUDIA 2 SENSOR) MISC     empagliflozin (JARDIANCE) 10 MG TABS tablet     insulin aspart (NOVOLOG PEN) 100 UNIT/ML pen     insulin degludec (TRESIBA FLEXTOUCH) 100 UNIT/ML pen     ketoconazole (NIZORAL) 2 % external shampoo     ketorolac (ACULAR) 0.5 % ophthalmic solution     lamiVUDine (EPIVIR) 100 MG tablet     lisinopril (ZESTRIL) 5 MG tablet     metFORMIN (GLUCOPHAGE XR) 500 MG 24 hr tablet     metoprolol succinate ER (TOPROL XL) 25 MG 24 hr tablet     Multiple Vitamin (TAB-A-GLADIS) TABS     mycophenolate (GENERIC EQUIVALENT) 250 MG capsule     order for DME     pantoprazole (PROTONIX) 40 MG EC tablet     prednisoLONE acetate (PRED FORTE) 1 % ophthalmic suspension     predniSONE (DELTASONE) 5 MG tablet     rosuvastatin (CRESTOR) 5 MG tablet     tamsulosin (FLOMAX) 0.4 MG capsule     Vitamin D3 50 mcg (2000 units) tablet     Current Facility-Administered Medications   Medication     aflibercept (EYLEA) injection prefilled syringe 2 mg     aflibercept (EYLEA) injection prefilled syringe 2 mg          Allergies   Allergen Reactions     Codeine Other (See Comments)     Cannot take due to liver  Cannot tolerate oral narcotics     Seasonal Allergies      Sneezing, coughing, runny and itchy eyes        Physical Exam  There were no vitals taken for this visit.  GENERAL: healthy, alert and no distress  RESP: no audible wheeze, cough, or visible cyanosis.  No visible retractions or increased work of breathing.  Able to speak fully in complete sentences.  PSYCH: mentation appears normal, affect normal/bright, judgement and insight intact, normal speech and appearance well-groomed    RESULTS  Lab Results   Component Value Date    A1C 6.9 (H) 12/14/2022    A1C 6.0 (H) 01/10/2022    A1C 6.8 (H) 07/13/2021    A1C 6.0 (H) 12/30/2020    A1C 6.3 (H) 06/13/2020    A1C 6.6 (H) 08/08/2018    A1C 6.5 (H) 06/09/2017    A1C 7.8 (H) 10/25/2016    HEMOGLOBINA1 4.8 03/04/2020    HEMOGLOBINA1 5.1 12/02/2019    HEMOGLOBINA1 5.4 08/14/2019    HEMOGLOBINA1 6.1 (A) 02/11/2019    HEMOGLOBINA1 8.1 (A) 04/11/2018       TSH   Date Value Ref Range Status   04/25/2022 1.24 0.40 - 4.00 mU/L Final   10/04/2021 1.90 0.40 - 4.00 mU/L Final   01/28/2021 0.91 0.40 - 4.00 mU/L Final   01/24/2020 1.91 0.40 - 4.00 mU/L Final   08/08/2018 0.49 0.40 - 4.00 mU/L Final   02/08/2018 0.71 0.40 - 4.00 mU/L Final   06/09/2017 0.42 0.40 - 4.00 mU/L Final     T4 Free   Date Value Ref Range Status   08/08/2018 1.21 0.76 - 1.46 ng/dL Final       ALT   Date Value Ref Range Status   03/15/2023 23 10 - 50 U/L Final   12/14/2022 30 10 - 50 U/L Final   06/28/2021 20 0 - 70 U/L Final   06/14/2021 21 0 - 70 U/L Final   ]    Recent Labs   Lab Test 12/14/22  0847 09/07/21  1025 09/25/20  0859   CHOL 220* 176 146   HDL 40 34* 39*   LDL  --  95 61   TRIG 498* 233* 228*       Lab Results   Component Value Date     03/30/2023     06/28/2021      Lab Results   Component Value Date    POTASSIUM 4.5 03/30/2023    POTASSIUM 4.7 07/20/2022    POTASSIUM 4.6 06/28/2021     Lab Results   Component Value Date    CHLORIDE 103 03/30/2023    CHLORIDE 105 07/20/2022    CHLORIDE 107 06/28/2021     Lab Results   Component Value Date    DEBORAH 9.2 03/30/2023    DEBORAH 9.1  06/28/2021     Lab Results   Component Value Date    CO2 26 03/30/2023    CO2 26 07/20/2022    CO2 25 06/28/2021     Lab Results   Component Value Date    BUN 29.1 03/30/2023    BUN 32 07/20/2022    BUN 33 06/28/2021     Lab Results   Component Value Date    CR 1.80 03/30/2023    CR 1.62 06/28/2021       GFR Estimate   Date Value Ref Range Status   03/30/2023 43 (L) >60 mL/min/1.73m2 Final     Comment:     eGFR calculated using 2021 CKD-EPI equation.   03/15/2023 34 (L) >60 mL/min/1.73m2 Final     Comment:     eGFR calculated using 2021 CKD-EPI equation.   12/14/2022 46 (L) >60 mL/min/1.73m2 Final     Comment:     Effective December 21, 2021 eGFRcr in adults is calculated using the 2021 CKD-EPI creatinine equation which includes age and gender (Allie lira al., NEJ, DOI: 10.1056/QBPVrg6320553)   06/28/2021 46 (L) >60 mL/min/[1.73_m2] Final     Comment:     Non  GFR Calc  Starting 12/18/2018, serum creatinine based estimated GFR (eGFR) will be   calculated using the Chronic Kidney Disease Epidemiology Collaboration   (CKD-EPI) equation.     06/14/2021 49 (L) >60 mL/min/[1.73_m2] Final     Comment:     Non  GFR Calc  Starting 12/18/2018, serum creatinine based estimated GFR (eGFR) will be   calculated using the Chronic Kidney Disease Epidemiology Collaboration   (CKD-EPI) equation.     06/04/2021 46 (L) >60 mL/min/[1.73_m2] Final     Comment:     Non  GFR Calc  Starting 12/18/2018, serum creatinine based estimated GFR (eGFR) will be   calculated using the Chronic Kidney Disease Epidemiology Collaboration   (CKD-EPI) equation.       GFR, ESTIMATED POCT   Date Value Ref Range Status   12/14/2022 40 (L) >60 mL/min/1.73m2 Final   06/08/2022 46 (L) >60 mL/min/1.73m2 Final   11/26/2021 47 (L) >60 mL/min/1.73m2 Final     GFR Estimate If Black   Date Value Ref Range Status   06/28/2021 54 (L) >60 mL/min/[1.73_m2] Final     Comment:      GFR Calc  Starting  2018, serum creatinine based estimated GFR (eGFR) will be   calculated using the Chronic Kidney Disease Epidemiology Collaboration   (CKD-EPI) equation.     2021 57 (L) >60 mL/min/[1.73_m2] Final     Comment:      GFR Calc  Starting 2018, serum creatinine based estimated GFR (eGFR) will be   calculated using the Chronic Kidney Disease Epidemiology Collaboration   (CKD-EPI) equation.     2021 53 (L) >60 mL/min/[1.73_m2] Final     Comment:      GFR Calc  Starting 2018, serum creatinine based estimated GFR (eGFR) will be   calculated using the Chronic Kidney Disease Epidemiology Collaboration   (CKD-EPI) equation.         Lab Results   Component Value Date    MICROL 401.0 03/15/2023    MICROL 47 2022    MICROL 563 2021     No results found for: MICROALBUMIN  No results found for: CPEPT, GADAB, ISCAB    Vitamin B12   Date Value Ref Range Status   2022 2,179 (H) 193 - 986 pg/mL Final   2020 682 193 - 986 pg/mL Final   2019 3,006 (H) 193 - 986 pg/mL Final       Most recent eye exam date: : Not Found     133/74, pulse 89  135/76, pulse 91  137/71, 95  158/79, 97  138/75, 81    Assessment/Plan:     1.  Type 2 diabetes- Doing well.  Average glucose is down significantly.  He is tolerating medications and not having hypoglycemia. Would likely benefit from higher dose SGLT_2 inhibitor in the future, but he did have JERONIMO with illness/dehydration in the past and he does not drink a lot of water normally.Will continue current treatment plan, as he is doing much better.  Will recheck a1c.  We made the following plan today (instructions given to patient):   Continue the following treatment plan for your diabetes:     - Tresiba 36 units /day.  - Carbohydrate coverage to 1 unit:10 g of carbohydrate   - Correction Novolounit Novolo mg /dl >180    - Empagliflozin 10 mg daily   - Metformin- 500 mg bid.     Drink lots of water.      STOP  "Jardiance and Metformin during times of illness or dehydration.     Please increase rosuvastatin to 10 mg daily.  Recheck cholesterol in 3 mos.      Recheck A1c.     Please let me know if you are having low blood sugars less than 70 or over 250 mg/dL.      If you have concerns, please send me a [employer] message M-Th, call the clinic at [phone], or call [phone] after hours/weekends and ask to speak with the endocrinologist on call.    2.  Risk factors-     Retinopathy:  Yes.  Had eye exam within last year. Scheduled in May.   Nephropathy:  BP had been running a bit high, but home BPs have been under good control.+ albuminuria. On SGLT-2 inhibitor and increased lisinopril recently.  Creatinine stable. GFR 46.    Neuropathy: Yes.   Feet: OK, no ulcers. +neuropathy \"Constricting feeling.\" Sees podiatry. Wears diabetic shoe.   Lipids:  TG were high at last check. Goal LDL <70. Patient taking rosuvastatin 5 mg. Will increase to 10 mg daily.  Will recheck cholesterol in 3 months.       3.  F/U in 3 months, sooner with concerns. Consider graduation to primary care if glucose control remains stable at next visit.     28 minutes spent on the date of the encounter doing chart review, review of test results, review and interpretation of glucose data, patient visit and documentation, counseling/coordination of care, and discussion of follow up plan for worsening hyper and hypoglycemia.  The patient understood and is satisfied with today's visit.          Frannie Vasquez PA-C, MPAS   [facility]  Department of Medicine  Division of Endocrinology and Diabetes        "

## 2023-04-18 NOTE — ADDENDUM NOTE
Addended by: LEANNE ROMAN on: 8/12/2020 07:51 AM     Modules accepted: Orders    
Interventional Radiology

## 2023-04-20 DIAGNOSIS — E11.3313 TYPE 2 DIABETES MELLITUS WITH MODERATE NONPROLIFERATIVE RETINOPATHY OF BOTH EYES AND MACULAR EDEMA, UNSPECIFIED WHETHER LONG TERM INSULIN USE (H): Primary | ICD-10-CM

## 2023-04-25 ENCOUNTER — VIRTUAL VISIT (OUTPATIENT)
Dept: ENDOCRINOLOGY | Facility: CLINIC | Age: 58
End: 2023-04-25
Payer: MEDICARE

## 2023-04-25 DIAGNOSIS — E11.9 WELL CONTROLLED TYPE 2 DIABETES MELLITUS (H): Primary | ICD-10-CM

## 2023-04-25 PROCEDURE — 99213 OFFICE O/P EST LOW 20 MIN: CPT | Mod: VID | Performed by: PHYSICIAN ASSISTANT

## 2023-04-25 RX ORDER — ROSUVASTATIN CALCIUM 10 MG/1
10 TABLET, COATED ORAL DAILY
Qty: 90 TABLET | Refills: 3 | Status: SHIPPED | OUTPATIENT
Start: 2023-04-25 | End: 2023-05-02

## 2023-04-25 NOTE — NURSING NOTE
Is the patient currently in the state of [place]? YES    Visit mode:VIDEO    If the visit is dropped, the patient can be reconnected by: VIDEO VISIT: Send to e-mail at: [email or web address]    Will anyone else be joining the visit? NO      How would you like to obtain your AVS? MyChart    Are changes needed to the allergy or medication list? NO    Reason for visit: Video Visit

## 2023-04-25 NOTE — PATIENT INSTRUCTIONS
Nice seeing you, Miller!    Here is what we discussed today:     Continue the following treatment plan for your diabetes:     - Tresiba 36 units /day.  - Carbohydrate coverage to 1 unit:10 g of carbohydrate   - Correction Novolounit Novolo mg /dl >180    - Empagliflozin 10 mg daily   - Metformin- 500 mg bid.     Drink lots of water.      STOP Jardiance and Metformin during times of illness or dehydration.     Please increase rosuvastatin to 10 mg daily.  Recheck cholesterol in 3 mos.      Recheck A1c.     Please let me know if you are having low blood sugars less than 70 or over 250 mg/dL.      If you have concerns, please send me a [employer] message M-Th, call the clinic at [phone], or call [phone] after hours/weekends and ask to speak with the endocrinologist on call.

## 2023-04-25 NOTE — LETTER
2023       RE: Frandy Workman  [address] [facility] [address]     Dear Colleague,    Thank you for referring your patient, Frandy Workman, to the [facility] ENDOCRINOLOGY CLINIC [place] at [facility]. Please see a copy of my visit note below.    Outcome for 23 9:25 AM: Data uploaded on [employer]  Yojana Wang MA  Outcome for 23 2:19 PM: Data obtained via [employer] website  Yojana Wang MA    Patient is showing 4/5 MNCM met. BP out range   Yojana Wang MA     Start time: 930  End time: 946  Provider location: off site- home  Patient location: off site- home.      HPI:     Frandy Workman is a 58 year old man here for follow up of type 2 diabetes complicated by nephropathy, retinopathy and neuropathy. 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  He reports that he feels good today and his glucose has been lower since we added metformin last visit and increased his insulin.  He has not had any low blood sugars.   High this month has been about 280, low was about 95 mg/dL.  He has been  trying to increase the fiber in his diet.      DM Regimen:  - Tresiba 36 units /day.  - Carbohydrate coverage to 1 unit:10 g of carbohydrate (5-10 units at a time).  - Correction Novolounit Novolo mg /dl >180    - Empagliflozin 10 mg daily (had JERONIMO/hypovolemias on higher dose).  - Metformin- started to get diarrhea from it.  500 mg bid.     Previous treatments:  Metformin- unsure why he went off. Kidneys?    Patient wears a camilo 2 sensor with an overall average of 157 mg/dL (CV 21%, TIR 81%, hypo 0%, severe hypo 0%) over the past 2 weeks. This is down significantly from 201 mg/dL at his last visit.  Recent glucose is as follows:         Diabetes Control:   Lab Results    Component Value Date    A1C 6.0 01/10/2022    A1C 6.8 07/13/2021    A1C 6.0 12/30/2020    A1C 6.3 06/13/2020    A1C 6.6 08/08/2018    A1C 6.5 06/09/2017    A1C 7.8 10/25/2016       Past Medical History:   Diagnosis Date    Anemia 2013    Arthritis     BPH (benign prostatic hyperplasia)     CAD (coronary artery disease) 4/1/2019    Cholelithiasis     Conductive hearing loss 08/16/2017    Depressive disorder 1986    Suffer effects throughout life    Gastroesophageal reflux disease 12/01/2014    HCC (hepatocellular carcinoma) (H) 1/22/2019    History of diabetic retinopathy 07/2018    HTN (hypertension)     HTN (hypertension) 11/20/2019    Hyperlipidemia     Liver cirrhosis secondary to ESTRADA (H)     Liver transplanted (H) 11/11/2019    Portal vein thrombosis 4/11/2019    Type II diabetes mellitus (H)        Past Surgical History:   Procedure Laterality Date    BYPASS GRAFT ARTERY CORONARY N/A 7/14/2021    Procedure: median sternotomy, on cardiopulmonary bypass, CORONARY ARTERY BYPASS GRAFT (CABG) x2 with left greater saphenous vein endoscopic harvest and left internal mammery artery harvest;  Surgeon: Tom Zapata MD;  Location: [facility] OR    COLONOSCOPY      2015    COLONOSCOPY N/A 12/6/2019    Procedure: COLONOSCOPY, WITH POLYPECTOMY AND BIOPSY;  Surgeon: Adam Morton MD;  Location: [facility] GI    CV CENTRAL VENOUS CATHETER PLACEMENT N/A 7/12/2021    Procedure: Central Venous Catheter Placement;  Surgeon: Fermin Polanco MD;  Location:  HEART CARDIAC CATH LAB    CV CORONARY ANGIOGRAM N/A 7/12/2021    Procedure: Coronary Angiogram;  Surgeon: Fermin Polanco MD;  Location:  HEART CARDIAC CATH LAB    CV HEART CATHETERIZATION WITH POSSIBLE INTERVENTION N/A 2/26/2019    Procedure: CORS;  Surgeon: Jagdish Hoyt MD;  Location:  HEART CARDIAC CATH LAB    CV INTRA AORTIC BALLOON N/A 7/12/2021    Procedure: Intra Aortic Balloon Pump Insertion;  Surgeon: Fermin Polanco  MD Karyn;  Location:  HEART CARDIAC CATH LAB    ESOPHAGOSCOPY, GASTROSCOPY, DUODENOSCOPY (EGD), COMBINED N/A 11/17/2016    Procedure: COMBINED ESOPHAGOSCOPY, GASTROSCOPY, DUODENOSCOPY (EGD);  Surgeon: Santi Rosas MD;  Location:  GI    ESOPHAGOSCOPY, GASTROSCOPY, DUODENOSCOPY (EGD), COMBINED N/A 11/17/2017    Procedure: COMBINED ESOPHAGOSCOPY, GASTROSCOPY, DUODENOSCOPY (EGD);  EGD;  Surgeon: Santi Rosas MD;  Location:  GI    ESOPHAGOSCOPY, GASTROSCOPY, DUODENOSCOPY (EGD), COMBINED N/A 12/28/2018    Procedure: EGD;  Surgeon: Santi Rosas MD;  Location:  OR    ESOPHAGOSCOPY, GASTROSCOPY, DUODENOSCOPY (EGD), COMBINED N/A 12/6/2019    Procedure: ESOPHAGOGASTRODUODENOSCOPY, WITH BIOPSY;  Surgeon: Adam Morton MD;  Location:  GI    ESOPHAGOSCOPY, GASTROSCOPY, DUODENOSCOPY (EGD), COMBINED N/A 2/13/2020    Procedure: ESOPHAGOGASTRODUODENOSCOPY (EGD);  Surgeon: Santi Rosas MD;  Location:  GI    HEAD & NECK SURGERY      12/2017 at [facility].     IMPLANT GOLD WEIGHT EYELID Right 11/16/2017    Procedure: IMPLANT WEIGHT EYELID;  Right Upper Eyelid Weight, right tarsal strip lower eyelid;  Surgeon: Milana Malave MD;  Location:  OR    IR CHEMO EMBOLIZATION  1/22/2019    KNEE SURGERY Left     ORTHOPEDIC SURGERY      PAROTIDECTOMY, RADICAL NECK DISSECTION Right 11/2/2017    Procedure: PAROTIDECTOMY, RADICAL NECK DISSECTION;  Right Superfacial Parotidectomy , Facial nerve repair. with [facility] facial nerve monitor.;  Surgeon: Asiya Morgan MD;  Location: [facility] OR    PHACOEMULSIFICATION CLEAR CORNEA WITH STANDARD INTRAOCULAR LENS IMPLANT Right 1/24/2023    Procedure: PHACOEMULSIFICATION, COMPLEX CATARACT, WITH INTRAOCULAR LENS IMPLANT WITH TRYPAN RIGHT EYE;  Surgeon: Enriqueta Martin MD;  Location: [facility] OR    PHACOEMULSIFICATION WITH STANDARD INTRAOCULAR LENS IMPLANT Left 1/3/2023    Procedure: PHACOEMULSIFICATION, COMPLEX CATARACT, WITH STANDARD  INTRAOCULAR LENS IMPLANT INSERTION LEFT EYE;  Surgeon: Enriqueta Martin MD;  Location: [facility] OR    PICC INSERTION Left 2017    4fr SL BioFlo PICC, 44cm in the L basilic vein w/ tip in the low SVC    RETURN LIVER TRANSPLANT N/A 2019    Procedure: Exploratory laparotomy, hematoma evacuation, abdominal washout;  Surgeon: Александр Ramos MD;  Location: [facility] OR    TRANSPLANT LIVER RECIPIENT  DONOR N/A 2019    Procedure: TRANSPLANT, LIVER, RECIPIENT,  DONOR;  Surgeon: Александр Ramos MD;  Location: [facility] OR    VASCULAR SURGERY         Family History   Problem Relation Age of Onset    Skin Cancer Mother     Cancer Mother     Diabetes Mother          3/2016    Cerebrovascular Disease Mother         Passed away in Feb of this year, 80 years old.    Thyroid Disease Mother     Depression Mother     Colon Cancer Father 60    Pancreatic Cancer Father 60    Prostate Cancer Father     Colorectal Cancer Father     Macular Degeneration Father     Cancer Father     Glaucoma Father     Skin Cancer Father     Asthma Sister         Had since birth    Thyroid Disease Sister     Depression Sister     Colorectal Cancer Maternal Grandmother     Cancer Maternal Grandmother     Substance Abuse Maternal Grandmother         Alcohol    Prostate Cancer Maternal Grandfather     Substance Abuse Maternal Grandfather         Alcohol    Colorectal Cancer Paternal Grandmother     Liver Disease No family hx of     Melanoma No family hx of     Anesthesia Reaction No family hx of     Deep Vein Thrombosis (DVT) No family hx of        Social History     Socioeconomic History    Marital status:     Highest education level: Bachelor's degree (e.g., BA, AB, BS)   Tobacco Use    Smoking status: Former     Packs/day: 6.00     Years: 30.00     Pack years: 180.00     Types: Cigars, Cigarettes     Start date: 2016     Quit date: 10/25/2017     Years since quittin.0    Smokeless tobacco: Former      Types: Chew     Quit date: 10/31/2017    Tobacco comments:     1 tin per week   Substance and Sexual Activity    Alcohol use: No     Alcohol/week: 0.0 standard drinks     Comment: quit Sept. 1996    Drug use: No    Sexual activity: Not Currently     Partners: Female     Birth control/protection: Condom   Other Topics Concern    Parent/sibling w/ CABG, MI or angioplasty before 65F 55M? Yes   Social History Narrative    Prior , [facility]     Social Determinants of Health     Intimate Partner Violence: Not At Risk    Fear of Current or Ex-Partner: No    Emotionally Abused: No    Physically Abused: No    Sexually Abused: No       Current Outpatient Medications   Medication    acetaminophen (TYLENOL) 325 MG tablet    ammonium lactate (AMLACTIN) 12 % external cream    aspirin (SM ASPIRIN ADULT LOW STRENGTH) 81 MG EC tablet    BD VIKTORIA U/F 32G X 4 MM insulin pen needle    benzoyl peroxide 5 % external liquid    Continuous Blood Gluc Sensor (FREESTYLE CLAUDIA 2 SENSOR) MISC    empagliflozin (JARDIANCE) 10 MG TABS tablet    insulin aspart (NOVOLOG PEN) 100 UNIT/ML pen    insulin degludec (TRESIBA FLEXTOUCH) 100 UNIT/ML pen    ketoconazole (NIZORAL) 2 % external shampoo    ketorolac (ACULAR) 0.5 % ophthalmic solution    lamiVUDine (EPIVIR) 100 MG tablet    lisinopril (ZESTRIL) 5 MG tablet    metFORMIN (GLUCOPHAGE XR) 500 MG 24 hr tablet    metoprolol succinate ER (TOPROL XL) 25 MG 24 hr tablet    Multiple Vitamin (TAB-A-GLADIS) TABS    mycophenolate (GENERIC EQUIVALENT) 250 MG capsule    order for DME    pantoprazole (PROTONIX) 40 MG EC tablet    prednisoLONE acetate (PRED FORTE) 1 % ophthalmic suspension    predniSONE (DELTASONE) 5 MG tablet    rosuvastatin (CRESTOR) 5 MG tablet    tamsulosin (FLOMAX) 0.4 MG capsule    Vitamin D3 50 mcg (2000 units) tablet     Current Facility-Administered Medications   Medication    aflibercept (EYLEA) injection prefilled syringe 2 mg    aflibercept (EYLEA) injection prefilled syringe 2  mg          Allergies   Allergen Reactions    Codeine Other (See Comments)     Cannot take due to liver  Cannot tolerate oral narcotics    Seasonal Allergies      Sneezing, coughing, runny and itchy eyes       Physical Exam  There were no vitals taken for this visit.  GENERAL: healthy, alert and no distress  RESP: no audible wheeze, cough, or visible cyanosis.  No visible retractions or increased work of breathing.  Able to speak fully in complete sentences.  PSYCH: mentation appears normal, affect normal/bright, judgement and insight intact, normal speech and appearance well-groomed    RESULTS  Lab Results   Component Value Date    A1C 6.9 (H) 12/14/2022    A1C 6.0 (H) 01/10/2022    A1C 6.8 (H) 07/13/2021    A1C 6.0 (H) 12/30/2020    A1C 6.3 (H) 06/13/2020    A1C 6.6 (H) 08/08/2018    A1C 6.5 (H) 06/09/2017    A1C 7.8 (H) 10/25/2016    HEMOGLOBINA1 4.8 03/04/2020    HEMOGLOBINA1 5.1 12/02/2019    HEMOGLOBINA1 5.4 08/14/2019    HEMOGLOBINA1 6.1 (A) 02/11/2019    HEMOGLOBINA1 8.1 (A) 04/11/2018       TSH   Date Value Ref Range Status   04/25/2022 1.24 0.40 - 4.00 mU/L Final   10/04/2021 1.90 0.40 - 4.00 mU/L Final   01/28/2021 0.91 0.40 - 4.00 mU/L Final   01/24/2020 1.91 0.40 - 4.00 mU/L Final   08/08/2018 0.49 0.40 - 4.00 mU/L Final   02/08/2018 0.71 0.40 - 4.00 mU/L Final   06/09/2017 0.42 0.40 - 4.00 mU/L Final     T4 Free   Date Value Ref Range Status   08/08/2018 1.21 0.76 - 1.46 ng/dL Final       ALT   Date Value Ref Range Status   03/15/2023 23 10 - 50 U/L Final   12/14/2022 30 10 - 50 U/L Final   06/28/2021 20 0 - 70 U/L Final   06/14/2021 21 0 - 70 U/L Final   ]    Recent Labs   Lab Test 12/14/22  0847 09/07/21  1025 09/25/20  0859   CHOL 220* 176 146   HDL 40 34* 39*   LDL  --  95 61   TRIG 498* 233* 228*       Lab Results   Component Value Date     03/30/2023     06/28/2021      Lab Results   Component Value Date    POTASSIUM 4.5 03/30/2023    POTASSIUM 4.7 07/20/2022    POTASSIUM 4.6  06/28/2021     Lab Results   Component Value Date    CHLORIDE 103 03/30/2023    CHLORIDE 105 07/20/2022    CHLORIDE 107 06/28/2021     Lab Results   Component Value Date    DEBORAH 9.2 03/30/2023    DEBORAH 9.1 06/28/2021     Lab Results   Component Value Date    CO2 26 03/30/2023    CO2 26 07/20/2022    CO2 25 06/28/2021     Lab Results   Component Value Date    BUN 29.1 03/30/2023    BUN 32 07/20/2022    BUN 33 06/28/2021     Lab Results   Component Value Date    CR 1.80 03/30/2023    CR 1.62 06/28/2021       GFR Estimate   Date Value Ref Range Status   03/30/2023 43 (L) >60 mL/min/1.73m2 Final     Comment:     eGFR calculated using 2021 CKD-EPI equation.   03/15/2023 34 (L) >60 mL/min/1.73m2 Final     Comment:     eGFR calculated using 2021 CKD-EPI equation.   12/14/2022 46 (L) >60 mL/min/1.73m2 Final     Comment:     Effective December 21, 2021 eGFRcr in adults is calculated using the 2021 CKD-EPI creatinine equation which includes age and gender (Allie et al., NEJM, DOI: 10.1056/DOVVwq1198743)   06/28/2021 46 (L) >60 mL/min/[1.73_m2] Final     Comment:     Non  GFR Calc  Starting 12/18/2018, serum creatinine based estimated GFR (eGFR) will be   calculated using the Chronic Kidney Disease Epidemiology Collaboration   (CKD-EPI) equation.     06/14/2021 49 (L) >60 mL/min/[1.73_m2] Final     Comment:     Non  GFR Calc  Starting 12/18/2018, serum creatinine based estimated GFR (eGFR) will be   calculated using the Chronic Kidney Disease Epidemiology Collaboration   (CKD-EPI) equation.     06/04/2021 46 (L) >60 mL/min/[1.73_m2] Final     Comment:     Non  GFR Calc  Starting 12/18/2018, serum creatinine based estimated GFR (eGFR) will be   calculated using the Chronic Kidney Disease Epidemiology Collaboration   (CKD-EPI) equation.       GFR, ESTIMATED POCT   Date Value Ref Range Status   12/14/2022 40 (L) >60 mL/min/1.73m2 Final   06/08/2022 46 (L) >60 mL/min/1.73m2 Final    11/26/2021 47 (L) >60 mL/min/1.73m2 Final     GFR Estimate If Black   Date Value Ref Range Status   06/28/2021 54 (L) >60 mL/min/[1.73_m2] Final     Comment:      GFR Calc  Starting 12/18/2018, serum creatinine based estimated GFR (eGFR) will be   calculated using the Chronic Kidney Disease Epidemiology Collaboration   (CKD-EPI) equation.     06/14/2021 57 (L) >60 mL/min/[1.73_m2] Final     Comment:      GFR Calc  Starting 12/18/2018, serum creatinine based estimated GFR (eGFR) will be   calculated using the Chronic Kidney Disease Epidemiology Collaboration   (CKD-EPI) equation.     06/04/2021 53 (L) >60 mL/min/[1.73_m2] Final     Comment:      GFR Calc  Starting 12/18/2018, serum creatinine based estimated GFR (eGFR) will be   calculated using the Chronic Kidney Disease Epidemiology Collaboration   (CKD-EPI) equation.         Lab Results   Component Value Date    MICROL 401.0 03/15/2023    MICROL 47 04/20/2022    MICROL 563 02/26/2021     No results found for: MICROALBUMIN  No results found for: CPEPT, GADAB, ISCAB    Vitamin B12   Date Value Ref Range Status   01/11/2022 2,179 (H) 193 - 986 pg/mL Final   06/13/2020 682 193 - 986 pg/mL Final   02/04/2019 3,006 (H) 193 - 986 pg/mL Final       Most recent eye exam date: : Not Found     133/74, pulse 89  135/76, pulse 91  137/71, 95  158/79, 97  138/75, 81    Assessment/Plan:     1.  Type 2 diabetes- Doing well.  Average glucose is down significantly.  He is tolerating medications and not having hypoglycemia. Would likely benefit from higher dose SGLT_2 inhibitor in the future, but he did have JERONIMO with illness/dehydration in the past and he does not drink a lot of water normally.Will continue current treatment plan, as he is doing much better.  Will recheck a1c.  We made the following plan today (instructions given to patient):   Continue the following treatment plan for your diabetes:     - Tresiba 36 units /day.  -  "Carbohydrate coverage to 1 unit:10 g of carbohydrate   - Correction Novolounit Novolo mg /dl >180    - Empagliflozin 10 mg daily   - Metformin- 500 mg bid.     Drink lots of water.      STOP Jardiance and Metformin during times of illness or dehydration.     Please increase rosuvastatin to 10 mg daily.  Recheck cholesterol in 3 mos.      Recheck A1c.     Please let me know if you are having low blood sugars less than 70 or over 250 mg/dL.      If you have concerns, please send me a [employer] message M-, call the clinic at [phone], or call [phone] after hours/weekends and ask to speak with the endocrinologist on call.    2.  Risk factors-     Retinopathy:  Yes.  Had eye exam within last year. Scheduled in May.   Nephropathy:  BP had been running a bit high, but home BPs have been under good control.+ albuminuria. On SGLT-2 inhibitor and increased lisinopril recently.  Creatinine stable. GFR 46.    Neuropathy: Yes.   Feet: OK, no ulcers. +neuropathy \"Constricting feeling.\" Sees podiatry. Wears diabetic shoe.   Lipids:  TG were high at last check. Goal LDL <70. Patient taking rosuvastatin 5 mg. Will increase to 10 mg daily.  Will recheck cholesterol in 3 months.       3.  F/U in 3 months, sooner with concerns. Consider graduation to primary care if glucose control remains stable at next visit.     28 minutes spent on the date of the encounter doing chart review, review of test results, review and interpretation of glucose data, patient visit and documentation, counseling/coordination of care, and discussion of follow up plan for worsening hyper and hypoglycemia.  The patient understood and is satisfied with today's visit.          Frannie Vasquez PA-C, MPAS   [facility]  Department of Medicine  Division of Endocrinology and Diabetes      "

## 2023-04-26 ENCOUNTER — ANCILLARY PROCEDURE (OUTPATIENT)
Dept: BONE DENSITY | Facility: CLINIC | Age: 58
End: 2023-04-26
Attending: INTERNAL MEDICINE
Payer: MEDICARE

## 2023-04-26 DIAGNOSIS — M19.90 ARTHRITIS: ICD-10-CM

## 2023-04-26 DIAGNOSIS — Z94.4 LIVER REPLACED BY TRANSPLANT (H): ICD-10-CM

## 2023-04-26 DIAGNOSIS — D84.9 IMMUNOSUPPRESSION (H): ICD-10-CM

## 2023-04-26 DIAGNOSIS — Z79.620 LONG TERM (CURRENT) USE OF IMMUNOSUPPRESSIVE BIOLOGIC: ICD-10-CM

## 2023-04-26 PROCEDURE — 77080 DXA BONE DENSITY AXIAL: CPT | Performed by: INTERNAL MEDICINE

## 2023-04-26 ASSESSMENT — SLEEP AND FATIGUE QUESTIONNAIRES
HOW LIKELY ARE YOU TO NOD OFF OR FALL ASLEEP WHEN YOU ARE A PASSENGER IN A CAR FOR AN HOUR WITHOUT A BREAK: SLIGHT CHANCE OF DOZING
HOW LIKELY ARE YOU TO NOD OFF OR FALL ASLEEP WHILE WATCHING TV: SLIGHT CHANCE OF DOZING
HOW LIKELY ARE YOU TO NOD OFF OR FALL ASLEEP WHILE SITTING AND READING: SLIGHT CHANCE OF DOZING
HOW LIKELY ARE YOU TO NOD OFF OR FALL ASLEEP WHILE SITTING AND TALKING TO SOMEONE: WOULD NEVER DOZE
HOW LIKELY ARE YOU TO NOD OFF OR FALL ASLEEP WHILE SITTING INACTIVE IN A PUBLIC PLACE: WOULD NEVER DOZE
HOW LIKELY ARE YOU TO NOD OFF OR FALL ASLEEP WHILE LYING DOWN TO REST IN THE AFTERNOON WHEN CIRCUMSTANCES PERMIT: MODERATE CHANCE OF DOZING
HOW LIKELY ARE YOU TO NOD OFF OR FALL ASLEEP IN A CAR, WHILE STOPPED FOR A FEW MINUTES IN TRAFFIC: WOULD NEVER DOZE
HOW LIKELY ARE YOU TO NOD OFF OR FALL ASLEEP WHILE SITTING QUIETLY AFTER LUNCH WITHOUT ALCOHOL: SLIGHT CHANCE OF DOZING

## 2023-04-28 NOTE — PROGRESS NOTES
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    He reports that since May started to have palpitations. They would occur every night. They would wake him from sleep. Occasionally occurring during the day. No lightheadedness, dizziness, or syncope. Frequency has decreased, but still occurs once in awhile at night. Last time was in the middle of the night when an ambulance driving by. No SOB or ROONEY, he lives on 4th floor and can walk up all 4 flights w/o SOB or CP.    Clinic visit 7/27/2021:  Since our last visit, patient was admitted to the hospital from 7/13/21 to 7/22/21. Patient presented with NSTEMI. He was found to have severe distal LM disease at the trifurcation of LCx, LAD, and ramus. On 7/14/21, patient underwent CABG (LIMA to LAD and SVG to OM2). His hospital stay was uncomplicated and was discharged home on 7/22/21.  Patient states, he is recovering well from his surgery. He does have pain at the chest incision site with movement. No clicking or abnormal sound at the sternotomy site. He is able to ambulate without difficulty. He has no PND, orthopnea, or LE edema.     03/15/22:  He has been somewhat tired from anemia which has limited his activity but he plans to try to increase this soon, resuming walking and light weights. No chest pain or dyspnea. Very rare very minimal pain at sternotomy site. He completed cardiac rehab.   TTE 3/15/22 (images reviewed by me): Normal LV size/function, LVEF>65%. Probably normal RV size/function, RVFAC 40%. No significant valvular abnormalities. No significant change from 1/11/22.    5/2/2023: Miller is here today for routine follow up. Since last visit, he has been feeling well. Gained 160 lbs to 205 lbs due to  decreased activity. Trying to be more active and to cut down on calory intake.  Otherwise, his BP has been uncontrolled over the summer, lisinopril 5 mg daily was started in September by nephrology. His BP range is the 120s-130s/70s-80s at home. He would like us to hold off on increasing his BP medications since he is trying to lose weight and that might get him to goal.  Otherwise, he denies chest pain/pressure, SOB, dizziness, palpitations, edema, orthopnea or PND.      PAST MEDICAL HISTORY:  Past Medical History:   Diagnosis Date     Anemia 2013     Arthritis      BPH (benign prostatic hyperplasia)      CAD (coronary artery disease) 4/1/2019     Cholelithiasis      Conductive hearing loss 08/16/2017     Depressive disorder 1986    Suffer effects throughout life     Gastroesophageal reflux disease 12/01/2014     HCC (hepatocellular carcinoma) (H) 1/22/2019     History of diabetic retinopathy 07/2018     HTN (hypertension)      HTN (hypertension) 11/20/2019     Hyperlipidemia      Liver cirrhosis secondary to ESTRADA (H)      Liver transplanted (H) 11/11/2019     Portal vein thrombosis 4/11/2019     Type II diabetes mellitus (H)        CURRENT MEDICATIONS:  Current Outpatient Medications   Medication Sig Dispense Refill     acetaminophen (TYLENOL) 325 MG tablet Take 1 tablet (325 mg) by mouth every 6 hours as needed for mild pain 90 tablet 3     ammonium lactate (AMLACTIN) 12 % external cream Apply topically 2 times daily To feet. 140 g 5     aspirin (SM ASPIRIN ADULT LOW STRENGTH) 81 MG EC tablet Take 2 tablets (162 mg) by mouth daily 180 tablet 3     BD VIKTORIA U/F 32G X 4 MM insulin pen needle Use 5 per day 300 each 3     benzoyl peroxide 5 % external liquid Use topically in showers as a body wash 226 g 11     Continuous Blood Gluc Sensor (FREESTYLE CLAUDIA 2 SENSOR) [facility] 1 each See Admin Instructions Change every 14 days. 7 each 3     empagliflozin (JARDIANCE) 10 MG TABS tablet Take 1 tablet (10 mg) by mouth daily 90  tablet 3     insulin aspart (NOVOLOG PEN) 100 UNIT/ML pen Inject 5-10 Units Subcutaneous 4 times daily (with meals and nightly) 1unit : 10 g carb before meals.  Also add 1 unit : 50 mg/dl >180 before meals and at bedtime. 45 mL 3     insulin degludec (TRESIBA FLEXTOUCH) 100 UNIT/ML pen Inject 36 units subcutaneous once daily 45 mL 3     ketoconazole (NIZORAL) 2 % external shampoo Apply thin layer topically to scalp in shower (leave on 5 min prior to rinse); may also use as a body wash 120 mL 11     ketorolac (ACULAR) 0.5 % ophthalmic solution Place 1 drop into the right eye 4 times daily 10 mL 0     lamiVUDine (EPIVIR) 100 MG tablet Take 1 tablet (100 mg) by mouth daily 90 tablet 3     lisinopril (ZESTRIL) 5 MG tablet Take 1 tablet (5 mg) by mouth daily 30 tablet 11     metFORMIN (GLUCOPHAGE XR) 500 MG 24 hr tablet Take 2 tablets (1,000 mg) by mouth daily 180 tablet 3     metoprolol succinate ER (TOPROL XL) 25 MG 24 hr tablet Take 0.5 tablets (12.5 mg) by mouth daily 45 tablet 1     Multiple Vitamin (TAB-A-GLADIS) TABS TAKE ONE TABLET BY MOUTH ONCE DAILY 90 tablet 0     mycophenolate (GENERIC EQUIVALENT) 250 MG capsule Take 2 capsules (500 mg) by mouth every 12 hours 120 capsule 11     order for DME 1 Device by Device route daily Knee high compression socks 15-20 mmhg. 1 Device 0     pantoprazole (PROTONIX) 40 MG EC tablet Take 1 tablet (40 mg) by mouth daily before breakfast 90 tablet 3     prednisoLONE acetate (PRED FORTE) 1 % ophthalmic suspension Place 1 drop into the right eye 4 times daily 5 mL 1     predniSONE (DELTASONE) 5 MG tablet Take 1 tablet (5 mg) by mouth daily 90 tablet 3     rosuvastatin (CRESTOR) 10 MG tablet Take 1 tablet (10 mg) by mouth daily 90 tablet 3     tamsulosin (FLOMAX) 0.4 MG capsule Take 1 capsule (0.4 mg) by mouth daily 90 capsule 0     Vitamin D3 50 mcg (2000 units) tablet Take 1 tablet (50 mcg) by mouth daily 90 tablet 3       PAST SURGICAL HISTORY:  Past Surgical History:   Procedure  Laterality Date     BYPASS GRAFT ARTERY CORONARY N/A 7/14/2021    Procedure: median sternotomy, on cardiopulmonary bypass, CORONARY ARTERY BYPASS GRAFT (CABG) x2 with left greater saphenous vein endoscopic harvest and left internal mammery artery harvest;  Surgeon: Tom Zapata MD;  [facility]     COLONOSCOPY      2015     COLONOSCOPY N/A 12/6/2019    Procedure: COLONOSCOPY, WITH POLYPECTOMY AND BIOPSY;  Surgeon: Adam Morton MD;  [facility]: [facility]     CV CENTRAL VENOUS CATHETER PLACEMENT N/A 7/12/2021    Procedure: Central Venous Catheter Placement;  Surgeon: Fermin Polanco MD;  [facility]     CV CORONARY ANGIOGRAM N/A 7/12/2021    Procedure: Coronary Angiogram;  Surgeon: Fermin Polanco MD;  Location:  [facility]     CV HEART CATHETERIZATION WITH POSSIBLE INTERVENTION N/A 2/26/2019    Procedure: CORS;  Surgeon: Jagdish Hoyt MD;  Location:  [facility]     CV INTRA AORTIC BALLOON N/A 7/12/2021    Procedure: Intra Aortic Balloon Pump Insertion;  Surgeon: Fermin Polanco MD;  Location:  [facility] C[facility]     ESOPHAGOSCOPY, GASTROSCOPY, DUODENOSCOPY (EGD), COMBINED N/A 11/17/2016    Procedure: COMBINED ESOPHAGOSCOPY, GASTROSCOPY, DUODENOSCOPY (EGD);  Surgeon: Santi Rosas MD;  [facility]     ESOPHAGOSCOPY, GASTROSCOPY, DUODENOSCOPY (EGD), COMBINED N/A 11/17/2017    Procedure: COMBINED ESOPHAGOSCOPY, GASTROSCOPY, DUODENOSCOPY (EGD);  EGD;  Surgeon: Santi Rosas MD;  [facility]     ESOPHAGOSCOPY, GASTROSCOPY, DUODENOSCOPY (EGD), COMBINED N/A 12/28/2018    Procedure: EGD;  Surgeon: Santi Rosas MD;  Location:  OR     ESOPHAGOSCOPY, GASTROSCOPY, DUODENOSCOPY (EGD), COMBINED N/A 12/6/2019    Procedure: ESOPHAGOGASTRODUODENOSCOPY, WITH BIOPSY;  Surgeon: Adam Morton MD;  [facility]     ESOPHAGOSCOPY, GASTROSCOPY, DUODENOSCOPY (EGD),  COMBINED N/A 2020    Procedure: ESOPHAGOGASTRODUODENOSCOPY (EGD);  Surgeon: Santi Rosas MD;  [facility]     HEAD & NECK SURGERY      2017 at [facility].      IMPLANT GOLD WEIGHT EYELID Right 2017    Procedure: IMPLANT WEIGHT EYELID;  Right Upper Eyelid Weight, right tarsal strip lower eyelid;  Surgeon: Milana Malave MD;  [facility]     IR CHEMO EMBOLIZATION  2019     KNEE SURGERY Left      ORTHOPEDIC SURGERY       PAROTIDECTOMY, RADICAL NECK DISSECTION Right 2017    Procedure: PAROTIDECTOMY, RADICAL NECK DISSECTION;  Right Superfacial Parotidectomy , Facial nerve repair. with [facility] facial nerve monitor.;  Surgeon: Asiya Morgan MD;  [facility]     PHACOEMULSIFICATION CLEAR CORNEA WITH STANDARD INTRAOCULAR LENS IMPLANT Right 2023    Procedure: PHACOEMULSIFICATION, COMPLEX CATARACT, WITH INTRAOCULAR LENS IMPLANT WITH TRYPAN RIGHT EYE;  Surgeon: Enriqueta Martin MD;  [facility]: [facility]     PHACOEMULSIFICATION WITH STANDARD INTRAOCULAR LENS IMPLANT Left 1/3/2023    Procedure: PHACOEMULSIFICATION, COMPLEX CATARACT, WITH STANDARD INTRAOCULAR LENS IMPLANT INSERTION LEFT EYE;  Surgeon: Enriqueta Martin MD;  Location: [facility] OR     PICC INSERTION Left 2017    4fr SL BioFlo PICC, 44cm in the L basilic vein w/ tip in the low SVC     RETURN LIVER TRANSPLANT N/A 2019    Procedure: Exploratory laparotomy, hematoma evacuation, abdominal washout;  Surgeon: Александр Ramos MD;  [facility]     TRANSPLANT LIVER RECIPIENT  DONOR N/A 2019    Procedure: TRANSPLANT, LIVER, RECIPIENT,  DONOR;  Surgeon: Александр Ramos MD;  [facility]     VASCULAR SURGERY         ALLERGIES:     Allergies   Allergen Reactions     Codeine Other (See Comments)     Cannot take due to liver  Cannot tolerate oral narcotics     Seasonal Allergies      Sneezing, coughing, runny and itchy eyes       FAMILY HISTORY:  Family History   Problem  Relation Age of Onset     Skin Cancer Mother      Cancer Mother      Diabetes Mother          3/2016     Cerebrovascular Disease Mother         Passed away in Feb of this year, 80 years old.     Thyroid Disease Mother      Depression Mother      Colon Cancer Father 60     Pancreatic Cancer Father 60     Prostate Cancer Father      Colorectal Cancer Father      Macular Degeneration Father      Cancer Father      Glaucoma Father      Skin Cancer Father      Asthma Sister         Had since birth     Thyroid Disease Sister      Depression Sister      Colorectal Cancer Maternal Grandmother      Cancer Maternal Grandmother      Substance Abuse Maternal Grandmother         Alcohol     Prostate Cancer Maternal Grandfather      Substance Abuse Maternal Grandfather         Alcohol     Colorectal Cancer Paternal Grandmother      Liver Disease No family hx of      Melanoma No family hx of      Anesthesia Reaction No family hx of      Deep Vein Thrombosis (DVT) No family hx of      No family history of premature CAD or sudden death.    SOCIAL HISTORY:  Social History     Tobacco Use     Smoking status: Former     Packs/day: 6.00     Years: 30.00     Pack years: 180.00     Types: Cigars, Cigarettes     Start date: 2016     Quit date: 10/25/2017     Years since quittin.5     Smokeless tobacco: Former     Types: Chew     Quit date: 10/31/2017     Tobacco comments:     1 tin per week   Substance Use Topics     Alcohol use: No     Alcohol/week: 0.0 standard drinks of alcohol     Comment: quit 1996     Drug use: No       ROS:   A comprehensive 14 point review of systems is negative other than as mentioned in HPI.    Exam:  /73 (BP Location: Left arm, Patient Position: Chair, Cuff Size: Adult Large)   Pulse 105   Wt 93.3 kg (205 lb 11.2 oz)   SpO2 97%   BMI 30.36 kg/m    GENERAL APPEARANCE: healthy, alert and no distress  NECK: JVP not elevated  RESPIRATORY: lungs clear to auscultation  CARDIOVASCULAR:  regular rhythm, normal S1 and S2  EXTREMITIES: trace pitting edema in BLE    Labs:  Reviewed.       Testing/Procedures:    2/26/19 Coronary angiogram:  1. Severe liver disease undergoing transplant evaluation.  2. Moderate coronary artery disease of the ostial left main (40-50% stenosis) which does not appear to be flow-limiting based on minimal luminal area of 7.2mm2 by IVUS.  3. Radial artery sheath removed and hemostasis achieved with a TR band.    Coronary angiogram 7/12/2021  Severe distal Left main disease at trifurcation of LCx, LAD, and Ramus.     Operative report 7/27/2021:  Coronary artery bypass grafting x 2.  - Left internal mammary artery to left anterior descending artery  - Reversed saphenous vein graft to obtuse marginal artery 2    Zio patch (5/26-6/2): patient triggered events were (sinus tachy 102, 99 BPM); rare isolated PVCs and couplets    I personally visualized and interpreted:  TTE 3/15/22 (images reviewed by me): Normal LV size/function, LVEF>65%. Probably normal RV size/function, RVFAC 40%. No significant valvular abnormalities. No significant change from 1/11/22.      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    1. NSTEMI s/p CABG (LIMA to LAD and SVG to OM2) on 7/14/21 without angina  2. Dyslipidemia -- ,  on 12/14/2022  3. Obesity -- gained weight over the winter  4. DM II -- managed by endocrinology  Patient is doing well no angina  LDL could not be calculated last lipid panel of 12/2022 because of TG at 498  Plan:  - Continue Aspirin 81 mg daily  - Increase rosuvastatin from 10 mg to 20 mg daily given elevated TG. Recheck lipid panel in a year.  - Continue metoprolol succinate 12.5 mg daily  - Continue empagliflozin 10 mg daily  - Encouraged a Mediterranean diet and 5 days a week exercise/30 minutes a day        5. HTN: slight above goal: at home SBP 120s-130s/70s-80  - He would like us to hold off on increasing his BP medications since he is trying to lose weight and that might get him to goal (BP<130/80).  - Continue lisinopril and metoprolol.    Follow up in 1 year.    Discussed with Dr. Shu Cooney MD  Cardiovascular disease fellow  [facility]  [phone]  05/02/2023 3:26 PM      ATTENDING ATTESTATION     Patient was seen and evaluated with Dr. Cooney. History was confirmed personally by me. Labs, imaging studies, EKGs, and telemetry were reviewed. Agree with assessment and plan as outlined above. The note has been edited by me as needed to produce a single, cohesive document.     Manjeet Ireland MD  Staff Cardiologist

## 2023-05-01 DIAGNOSIS — Z94.4 STATUS POST LIVER TRANSPLANTATION (H): ICD-10-CM

## 2023-05-02 ENCOUNTER — OFFICE VISIT (OUTPATIENT)
Dept: CARDIOLOGY | Facility: CLINIC | Age: 58
End: 2023-05-02
Attending: INTERNAL MEDICINE
Payer: MEDICARE

## 2023-05-02 VITALS
DIASTOLIC BLOOD PRESSURE: 73 MMHG | OXYGEN SATURATION: 97 % | BODY MASS INDEX: 30.36 KG/M2 | HEART RATE: 105 BPM | WEIGHT: 205.7 LBS | SYSTOLIC BLOOD PRESSURE: 123 MMHG

## 2023-05-02 DIAGNOSIS — E11.9 WELL CONTROLLED TYPE 2 DIABETES MELLITUS (H): ICD-10-CM

## 2023-05-02 DIAGNOSIS — E78.5 HYPERLIPIDEMIA LDL GOAL <100: ICD-10-CM

## 2023-05-02 DIAGNOSIS — I25.10 CORONARY ARTERY DISEASE INVOLVING NATIVE CORONARY ARTERY OF NATIVE HEART WITHOUT ANGINA PECTORIS: Primary | ICD-10-CM

## 2023-05-02 PROCEDURE — G0463 HOSPITAL OUTPT CLINIC VISIT: HCPCS | Performed by: INTERNAL MEDICINE

## 2023-05-02 PROCEDURE — 99214 OFFICE O/P EST MOD 30 MIN: CPT | Mod: GC | Performed by: INTERNAL MEDICINE

## 2023-05-02 RX ORDER — ROSUVASTATIN CALCIUM 20 MG/1
20 TABLET, COATED ORAL DAILY
Qty: 90 TABLET | Refills: 3 | Status: SHIPPED | OUTPATIENT
Start: 2023-05-02 | End: 2024-03-28

## 2023-05-02 RX ORDER — PREDNISONE 5 MG/1
5 TABLET ORAL DAILY
Qty: 90 TABLET | Refills: 3 | Status: SHIPPED | OUTPATIENT
Start: 2023-05-02 | End: 2023-09-07

## 2023-05-02 ASSESSMENT — PAIN SCALES - GENERAL: PAINLEVEL: NO PAIN (0)

## 2023-05-02 NOTE — NURSING NOTE
Chief Complaint   Patient presents with     Follow Up     May 2, 2023 - Reason for visit: Return cardiology, 57 yo male with CAD, benign essential hypertenstion, dyslipidemia NSTEMI presents for annual follow up with labs and echo prior.     Vitals were taken and medications reconciled.    David Hsieh, EMT  2:53 PM

## 2023-05-02 NOTE — PATIENT INSTRUCTIONS
"Cardiology Providers you saw during your visit:  Dr. Ireland    Medication changes:  Increase rosuvastatin (Crestor) to 20 mg daily    Follow up:  1 year     Labs:  Fasting lipids with reflex to direct LDL    Please call if you have :  1. Weight gain of more than 2 pounds in a day or 5 pounds in a week  2. Increased shortness of breath, swelling or bloating  3. Dizziness, lightheadedness   4. Any questions or concerns.     Follow the American Heart Association Diet and Lifestyle recommendations:  Limit saturated fat, trans fat, sodium, red meat, sweets and sugar-sweetened beverages. If you choose to eat red meat, compare labels and select the leanest cuts available.  Aim for at least 150 minutes of moderate physical activity or 75 minutes of vigorous physical activity - or an equal combination of both - each week.    During business hours: [phone], option # 1 \"To leave a message for your care team\"     After hours, weekends or holidays: On Call Cardiologist [phone]   option #4 and ask to speak to the on-call Cardiologist. Inform them you are a CORE/heart failure patient at the [place].    Vivian Carver RN BSN  Cardiology Nurse Coordinator - Heart Failure Clinic  [facility]  [phone] option 1 to schedule an appointment or leave a message for your care team    "

## 2023-05-02 NOTE — NURSING NOTE
Med Reconcile: Reviewed and verified all current medications with the patient. The updated medication list was printed and given to the patient.    Return Appointment: Patient given instructions regarding scheduling next clinic visit. Patient demonstrated understanding of this information and agreed to call with further questions or concerns.    Medication Change: Patient was educated regarding prescribed medication change, including discussion of the indication, administration, side effects, and when to report to MD or RN. Patient demonstrated understanding of this information and agreed to call with further questions or concerns. Increase rosuvastatin to 20 mg daily.    Patient stated he understood all health information given and agreed to call with further questions or concerns.    Vivian Carver RN

## 2023-05-02 NOTE — LETTER
5/2/2023      RE: Frandy Workman  [address] [facility] [address]       Dear Colleague,    Thank you for the opportunity to participate in the care of your patient, Frandy Workman, at the [facility] [place] at [facility]. Please see a copy of my visit note below.    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    He reports that since May started to have palpitations. They would occur every night. They would wake him from sleep. Occasionally occurring during the day. No lightheadedness, dizziness, or syncope. Frequency has decreased, but still occurs once in awhile at night. Last time was in the middle of the night when an ambulance driving by. No SOB or ROONEY, he lives on 4th floor and can walk up all 4 flights w/o SOB or CP.    Clinic visit 7/27/2021:  Since our last visit, patient was admitted to the hospital from 7/13/21 to 7/22/21. Patient presented with NSTEMI. He was found to have severe distal LM disease at the trifurcation of LCx, LAD, and ramus. On 7/14/21, patient underwent CABG (LIMA to LAD and SVG to OM2). His hospital stay was uncomplicated and was discharged home on 7/22/21.  Patient states, he is recovering well from his surgery. He does have pain at the chest incision site with movement. No clicking or abnormal sound at the sternotomy site. He is able to ambulate without difficulty. He has no PND, orthopnea, or LE edema.     03/15/22:  He has been somewhat tired from anemia which has limited his activity but he plans to try to increase this soon, resuming walking and light weights. No chest pain or dyspnea. Very rare very minimal pain at sternotomy site. He  completed cardiac rehab.   TTE 3/15/22 (images reviewed by me): Normal LV size/function, LVEF>65%. Probably normal RV size/function, RVFAC 40%. No significant valvular abnormalities. No significant change from 1/11/22.    5/2/2023: Miller is here today for routine follow up. Since last visit, he has been feeling well. Gained 160 lbs to 205 lbs due to decreased activity. Trying to be more active and to cut down on calory intake.  Otherwise, his BP has been uncontrolled over the summer, lisinopril 5 mg daily was started in September by nephrology. His BP range is the 120s-130s/70s-80s at home. He would like us to hold off on increasing his BP medications since he is trying to lose weight and that might get him to goal.  Otherwise, he denies chest pain/pressure, SOB, dizziness, palpitations, edema, orthopnea or PND.      PAST MEDICAL HISTORY:  Past Medical History:   Diagnosis Date    Anemia 2013    Arthritis     BPH (benign prostatic hyperplasia)     CAD (coronary artery disease) 4/1/2019    Cholelithiasis     Conductive hearing loss 08/16/2017    Depressive disorder 1986    Suffer effects throughout life    Gastroesophageal reflux disease 12/01/2014    HCC (hepatocellular carcinoma) (H) 1/22/2019    History of diabetic retinopathy 07/2018    HTN (hypertension)     HTN (hypertension) 11/20/2019    Hyperlipidemia     Liver cirrhosis secondary to ESTRADA (H)     Liver transplanted (H) 11/11/2019    Portal vein thrombosis 4/11/2019    Type II diabetes mellitus (H)        CURRENT MEDICATIONS:  Current Outpatient Medications   Medication Sig Dispense Refill    acetaminophen (TYLENOL) 325 MG tablet Take 1 tablet (325 mg) by mouth every 6 hours as needed for mild pain 90 tablet 3    ammonium lactate (AMLACTIN) 12 % external cream Apply topically 2 times daily To feet. 140 g 5    aspirin (SM ASPIRIN ADULT LOW STRENGTH) 81 MG EC tablet Take 2 tablets (162 mg) by mouth daily 180 tablet 3    BD VIKTORIA U/F 32G X 4 MM insulin pen needle  Use 5 per day 300 each 3    benzoyl peroxide 5 % external liquid Use topically in showers as a body wash 226 g 11    Continuous Blood Gluc Sensor (FREESTYLE CLAUDIA 2 SENSOR) MISC 1 each See Admin Instructions Change every 14 days. 7 each 3    empagliflozin (JARDIANCE) 10 MG TABS tablet Take 1 tablet (10 mg) by mouth daily 90 tablet 3    insulin aspart (NOVOLOG PEN) 100 UNIT/ML pen Inject 5-10 Units Subcutaneous 4 times daily (with meals and nightly) 1unit : 10 g carb before meals.  Also add 1 unit : 50 mg/dl >180 before meals and at bedtime. 45 mL 3    insulin degludec (TRESIBA FLEXTOUCH) 100 UNIT/ML pen Inject 36 units subcutaneous once daily 45 mL 3    ketoconazole (NIZORAL) 2 % external shampoo Apply thin layer topically to scalp in shower (leave on 5 min prior to rinse); may also use as a body wash 120 mL 11    ketorolac (ACULAR) 0.5 % ophthalmic solution Place 1 drop into the right eye 4 times daily 10 mL 0    lamiVUDine (EPIVIR) 100 MG tablet Take 1 tablet (100 mg) by mouth daily 90 tablet 3    lisinopril (ZESTRIL) 5 MG tablet Take 1 tablet (5 mg) by mouth daily 30 tablet 11    metFORMIN (GLUCOPHAGE XR) 500 MG 24 hr tablet Take 2 tablets (1,000 mg) by mouth daily 180 tablet 3    metoprolol succinate ER (TOPROL XL) 25 MG 24 hr tablet Take 0.5 tablets (12.5 mg) by mouth daily 45 tablet 1    Multiple Vitamin (TAB-A-GLADIS) TABS TAKE ONE TABLET BY MOUTH ONCE DAILY 90 tablet 0    mycophenolate (GENERIC EQUIVALENT) 250 MG capsule Take 2 capsules (500 mg) by mouth every 12 hours 120 capsule 11    order for DME 1 Device by Device route daily Knee high compression socks 15-20 mmhg. 1 Device 0    pantoprazole (PROTONIX) 40 MG EC tablet Take 1 tablet (40 mg) by mouth daily before breakfast 90 tablet 3    prednisoLONE acetate (PRED FORTE) 1 % ophthalmic suspension Place 1 drop into the right eye 4 times daily 5 mL 1    predniSONE (DELTASONE) 5 MG tablet Take 1 tablet (5 mg) by mouth daily 90 tablet 3    rosuvastatin  (CRESTOR) 10 MG tablet Take 1 tablet (10 mg) by mouth daily 90 tablet 3    tamsulosin (FLOMAX) 0.4 MG capsule Take 1 capsule (0.4 mg) by mouth daily 90 capsule 0    Vitamin D3 50 mcg (2000 units) tablet Take 1 tablet (50 mcg) by mouth daily 90 tablet 3       PAST SURGICAL HISTORY:  Past Surgical History:   Procedure Laterality Date    BYPASS GRAFT ARTERY CORONARY N/A 7/14/2021    Procedure: median sternotomy, on cardiopulmonary bypass, CORONARY ARTERY BYPASS GRAFT (CABG) x2 with left greater saphenous vein endoscopic harvest and left internal mammery artery harvest;  Surgeon: Tom Zapata MD;  Location: [facility] OR    COLONOSCOPY      2015    COLONOSCOPY N/A 12/6/2019    Procedure: COLONOSCOPY, WITH POLYPECTOMY AND BIOPSY;  Surgeon: Adam Morton MD;  [facility]    CV CENTRAL VENOUS CATHETER PLACEMENT N/A 7/12/2021    Procedure: Central Venous Catheter Placement;  Surgeon: Fermin Polanco MD;  Location:  HEART CARDIAC CATH LAB    CV CORONARY ANGIOGRAM N/A 7/12/2021    Procedure: Coronary Angiogram;  Surgeon: Fermin Polanco MD;  Location:  HEART CARDIAC CATH LAB    CV HEART CATHETERIZATION WITH POSSIBLE INTERVENTION N/A 2/26/2019    Procedure: CORS;  Surgeon: Jagdish Hoyt MD;  Location:  HEART CARDIAC CATH LAB    CV INTRA AORTIC BALLOON N/A 7/12/2021    Procedure: Intra Aortic Balloon Pump Insertion;  Surgeon: Fermin Polanco MD;  Location:  HEART CARDIAC CATH LAB    ESOPHAGOSCOPY, GASTROSCOPY, DUODENOSCOPY (EGD), COMBINED N/A 11/17/2016    Procedure: COMBINED ESOPHAGOSCOPY, GASTROSCOPY, DUODENOSCOPY (EGD);  Surgeon: Santi Rosas MD;  Location:  GI    ESOPHAGOSCOPY, GASTROSCOPY, DUODENOSCOPY (EGD), COMBINED N/A 11/17/2017    Procedure: COMBINED ESOPHAGOSCOPY, GASTROSCOPY, DUODENOSCOPY (EGD);  EGD;  Surgeon: Snati Rosas MD;  Location:  GI    ESOPHAGOSCOPY, GASTROSCOPY, DUODENOSCOPY (EGD), COMBINED N/A 12/28/2018     Procedure: EGD;  Surgeon: Santi Rosas MD;  [facility] OR    ESOPHAGOSCOPY, GASTROSCOPY, DUODENOSCOPY (EGD), COMBINED N/A 2019    Procedure: ESOPHAGOGASTRODUODENOSCOPY, WITH BIOPSY;  Surgeon: Adam Morton MD;  Location:  GI    ESOPHAGOSCOPY, GASTROSCOPY, DUODENOSCOPY (EGD), COMBINED N/A 2020    Procedure: ESOPHAGOGASTRODUODENOSCOPY (EGD);  Surgeon: Santi Rosas MD;  Location:  GI    HEAD & NECK SURGERY      2017 at [facility].     IMPLANT GOLD WEIGHT EYELID Right 2017    Procedure: IMPLANT WEIGHT EYELID;  Right Upper Eyelid Weight, right tarsal strip lower eyelid;  Surgeon: Milana Malave MD;  Location:  OR    IR CHEMO EMBOLIZATION  2019    KNEE SURGERY Left     ORTHOPEDIC SURGERY      PAROTIDECTOMY, RADICAL NECK DISSECTION Right 2017    Procedure: PAROTIDECTOMY, RADICAL NECK DISSECTION;  Right Superfacial Parotidectomy , Facial nerve repair. with [facility] facial nerve monitor.;  Surgeon: Asiya Morgan MD;  Location: [facility] OR    PHACOEMULSIFICATION CLEAR CORNEA WITH STANDARD INTRAOCULAR LENS IMPLANT Right 2023    Procedure: PHACOEMULSIFICATION, COMPLEX CATARACT, WITH INTRAOCULAR LENS IMPLANT WITH TRYPAN RIGHT EYE;  Surgeon: Enriqueta Martin MD;  Location: [facility] OR    PHACOEMULSIFICATION WITH STANDARD INTRAOCULAR LENS IMPLANT Left 1/3/2023    Procedure: PHACOEMULSIFICATION, COMPLEX CATARACT, WITH STANDARD INTRAOCULAR LENS IMPLANT INSERTION LEFT EYE;  Surgeon: Enriqueta Martin MD;  Location: [facility] OR    PICC INSERTION Left 2017    4fr SL BioFlo PICC, 44cm in the L basilic vein w/ tip in the low SVC    RETURN LIVER TRANSPLANT N/A 2019    Procedure: Exploratory laparotomy, hematoma evacuation, abdominal washout;  Surgeon: Александр Ramos MD;  Location: [facility] OR    TRANSPLANT LIVER RECIPIENT  DONOR N/A 2019    Procedure: TRANSPLANT, LIVER, RECIPIENT,  DONOR;  Surgeon: Александр Ramos MD;  [facility]     VASCULAR SURGERY         ALLERGIES:     Allergies   Allergen Reactions    Codeine Other (See Comments)     Cannot take due to liver  Cannot tolerate oral narcotics    Seasonal Allergies      Sneezing, coughing, runny and itchy eyes       FAMILY HISTORY:  Family History   Problem Relation Age of Onset    Skin Cancer Mother     Cancer Mother     Diabetes Mother          3/2016    Cerebrovascular Disease Mother         Passed away in Feb of this year, 80 years old.    Thyroid Disease Mother     Depression Mother     Colon Cancer Father 60    Pancreatic Cancer Father 60    Prostate Cancer Father     Colorectal Cancer Father     Macular Degeneration Father     Cancer Father     Glaucoma Father     Skin Cancer Father     Asthma Sister         Had since birth    Thyroid Disease Sister     Depression Sister     Colorectal Cancer Maternal Grandmother     Cancer Maternal Grandmother     Substance Abuse Maternal Grandmother         Alcohol    Prostate Cancer Maternal Grandfather     Substance Abuse Maternal Grandfather         Alcohol    Colorectal Cancer Paternal Grandmother     Liver Disease No family hx of     Melanoma No family hx of     Anesthesia Reaction No family hx of     Deep Vein Thrombosis (DVT) No family hx of      No family history of premature CAD or sudden death.    SOCIAL HISTORY:  Social History     Tobacco Use    Smoking status: Former     Packs/day: 6.00     Years: 30.00     Pack years: 180.00     Types: Cigars, Cigarettes     Start date: 2016     Quit date: 10/25/2017     Years since quittin.5    Smokeless tobacco: Former     Types: Chew     Quit date: 10/31/2017    Tobacco comments:     1 tin per week   Substance Use Topics    Alcohol use: No     Alcohol/week: 0.0 standard drinks of alcohol     Comment: quit 1996    Drug use: No       ROS:   A comprehensive 14 point review of systems is negative other than as mentioned in HPI.    Exam:  /73 (BP Location: Left arm, Patient  Position: Chair, Cuff Size: Adult Large)   Pulse 105   Wt 93.3 kg (205 lb 11.2 oz)   SpO2 97%   BMI 30.36 kg/m    GENERAL APPEARANCE: healthy, alert and no distress  NECK: JVP not elevated  RESPIRATORY: lungs clear to auscultation  CARDIOVASCULAR: regular rhythm, normal S1 and S2  EXTREMITIES: trace pitting edema in BLE    Labs:  Reviewed.       Testing/Procedures:    2/26/19 Coronary angiogram:  1. Severe liver disease undergoing transplant evaluation.  2. Moderate coronary artery disease of the ostial left main (40-50% stenosis) which does not appear to be flow-limiting based on minimal luminal area of 7.2mm2 by IVUS.  3. Radial artery sheath removed and hemostasis achieved with a TR band.    Coronary angiogram 7/12/2021  Severe distal Left main disease at trifurcation of LCx, LAD, and Ramus.     Operative report 7/27/2021:  Coronary artery bypass grafting x 2.  - Left internal mammary artery to left anterior descending artery  - Reversed saphenous vein graft to obtuse marginal artery 2    Zio patch (5/26-6/2): patient triggered events were (sinus tachy 102, 99 BPM); rare isolated PVCs and couplets    I personally visualized and interpreted:  TTE 3/15/22 (images reviewed by me): Normal LV size/function, LVEF>65%. Probably normal RV size/function, RVFAC 40%. No significant valvular abnormalities. No significant change from 1/11/22.      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    1. NSTEMI s/p CABG (LIMA to LAD and SVG to OM2) on 7/14/21 without angina  2. Dyslipidemia -- ,  on 12/14/2022  3. Obesity -- gained weight over the winter  4. DM II -- managed by endocrinology  Patient is doing well no angina  LDL could not be calculated last lipid panel of 12/2022 because of TG at 498  Plan:  - Continue Aspirin 81 mg daily  - Increase  rosuvastatin from 10 mg to 20 mg daily given elevated TG. Recheck lipid panel in a year.  - Continue metoprolol succinate 12.5 mg daily  - Continue empagliflozin 10 mg daily  - Encouraged a Mediterranean diet and 5 days a week exercise/30 minutes a day       5. HTN: slight above goal: at home SBP 120s-130s/70s-80  - He would like us to hold off on increasing his BP medications since he is trying to lose weight and that might get him to goal (BP<130/80).  - Continue lisinopril and metoprolol.    Follow up in 1 year.    Discussed with Dr. Shu Cooney MD  Cardiovascular disease fellow  [facility]  [phone]  05/02/2023 3:26 PM      ATTENDING ATTESTATION     Patient was seen and evaluated with Dr. Cooney. History was confirmed personally by me. Labs, imaging studies, EKGs, and telemetry were reviewed. Agree with assessment and plan as outlined above. The note has been edited by me as needed to produce a single, cohesive document.     Manjeet Ireland MD  Staff Cardiologist

## 2023-05-03 ENCOUNTER — VIRTUAL VISIT (OUTPATIENT)
Dept: SLEEP MEDICINE | Facility: CLINIC | Age: 58
End: 2023-05-03
Attending: FAMILY MEDICINE
Payer: MEDICARE

## 2023-05-03 ENCOUNTER — OFFICE VISIT (OUTPATIENT)
Dept: OPHTHALMOLOGY | Facility: CLINIC | Age: 58
End: 2023-05-03
Attending: OPHTHALMOLOGY
Payer: MEDICARE

## 2023-05-03 VITALS
HEIGHT: 69 IN | DIASTOLIC BLOOD PRESSURE: 73 MMHG | BODY MASS INDEX: 30.36 KG/M2 | SYSTOLIC BLOOD PRESSURE: 140 MMHG | WEIGHT: 205 LBS

## 2023-05-03 DIAGNOSIS — Z91.89 RISK FACTORS FOR OBSTRUCTIVE SLEEP APNEA: ICD-10-CM

## 2023-05-03 DIAGNOSIS — R53.83 FATIGUE, UNSPECIFIED TYPE: Primary | ICD-10-CM

## 2023-05-03 DIAGNOSIS — E11.3313 TYPE 2 DIABETES MELLITUS WITH MODERATE NONPROLIFERATIVE RETINOPATHY OF BOTH EYES AND MACULAR EDEMA, UNSPECIFIED WHETHER LONG TERM INSULIN USE (H): ICD-10-CM

## 2023-05-03 DIAGNOSIS — R35.1 NOCTURIA: ICD-10-CM

## 2023-05-03 PROCEDURE — 99207 PR DROP WITH A PROCEDURE: CPT | Performed by: OPHTHALMOLOGY

## 2023-05-03 PROCEDURE — 250N000011 HC RX IP 250 OP 636: Performed by: STUDENT IN AN ORGANIZED HEALTH CARE EDUCATION/TRAINING PROGRAM

## 2023-05-03 PROCEDURE — 92134 CPTRZ OPH DX IMG PST SGM RTA: CPT | Performed by: OPHTHALMOLOGY

## 2023-05-03 PROCEDURE — 67028 INJECTION EYE DRUG: CPT | Mod: LT | Performed by: OPHTHALMOLOGY

## 2023-05-03 PROCEDURE — 250N000011 HC RX IP 250 OP 636: Performed by: OPHTHALMOLOGY

## 2023-05-03 PROCEDURE — 99204 OFFICE O/P NEW MOD 45 MIN: CPT | Mod: VID | Performed by: INTERNAL MEDICINE

## 2023-05-03 PROCEDURE — 67028 INJECTION EYE DRUG: CPT | Mod: RT | Performed by: OPHTHALMOLOGY

## 2023-05-03 RX ADMIN — AFLIBERCEPT 2 MG: 40 INJECTION, SOLUTION INTRAVITREAL at 16:16

## 2023-05-03 RX ADMIN — AFLIBERCEPT 2 MG: 40 INJECTION, SOLUTION INTRAVITREAL at 16:15

## 2023-05-03 ASSESSMENT — CONF VISUAL FIELD
OD_NORMAL: 1
OS_SUPERIOR_NASAL_RESTRICTION: 0
OD_SUPERIOR_NASAL_RESTRICTION: 0
OS_NORMAL: 1
OD_INFERIOR_NASAL_RESTRICTION: 0
OS_INFERIOR_TEMPORAL_RESTRICTION: 0
OS_INFERIOR_NASAL_RESTRICTION: 0
METHOD: COUNTING FINGERS
OS_SUPERIOR_TEMPORAL_RESTRICTION: 0
OD_SUPERIOR_TEMPORAL_RESTRICTION: 0
OD_INFERIOR_TEMPORAL_RESTRICTION: 0

## 2023-05-03 ASSESSMENT — VISUAL ACUITY
OS_CC: 20/25
OD_CC: 20/20
OS_CC+: -2
OD_CC+: -2
CORRECTION_TYPE: GLASSES
METHOD: SNELLEN - LINEAR

## 2023-05-03 ASSESSMENT — EXTERNAL EXAM - RIGHT EYE: OD_EXAM: NORMAL

## 2023-05-03 ASSESSMENT — REFRACTION_WEARINGRX
OS_AXIS: 044
OS_CYLINDER: +0.50
OD_CYLINDER: +0.75
OS_ADD: +2.00
OD_AXIS: 131
OD_SPHERE: -7.00
SPECS_TYPE: PAL
OD_ADD: +2.00
OS_SPHERE: -6.75

## 2023-05-03 ASSESSMENT — SLIT LAMP EXAM - LIDS
COMMENTS: NORMAL
COMMENTS: SMALL PAPILLOMA ALONG LL MARGIN, NON CONCERNING

## 2023-05-03 ASSESSMENT — EXTERNAL EXAM - LEFT EYE: OS_EXAM: PERIOCULAR ECCHYMOSIS

## 2023-05-03 ASSESSMENT — TONOMETRY
OS_IOP_MMHG: 19
IOP_METHOD: ICARE
OD_IOP_MMHG: 20

## 2023-05-03 ASSESSMENT — PAIN SCALES - GENERAL: PAINLEVEL: NO PAIN (0)

## 2023-05-03 NOTE — PROGRESS NOTES
CC:  Follow up Cataract surgery and DM    Interval: Here for 5 week follow-up of Type 2 diabetes mellitus with PDR both eyes and macular edema. VA subjectively unchanged, no flashes, no floaters. Last visit he did not receive any injections.     Lab Results   Component                Value               Date                       A1C                      6.9                 12/14/2022       HPI: Frandy Workman is a 58 year old patient status post Cataract extraction intraocular lens left eye   history of Diabetes mellitus for 24 ys . Patient on insulin.    Interval History: s/p CEIOL left eye 1/3/22    Retinal Imaging:  OCT 05/03/23   RE: Good foveal contour, central Diabetic macular edema worsened with a few cysts.  LE: Much worsened central diabetic macular edema, mild Epiretinal membrane stable, disruption of foveal contour.      fluorescein angiography 03/29/23 both eyes normal AV and choroidal filling ou. Staining laser scars. No obvious NVE, mild late macula leakage. peripheral leakage and capillary non perfusion.     Optos consistent with clinical exam      Assessment & Plan:    # status post Cataract extraction intraocular lens right eye   Right eye: 01/24/23; left eye 01/3/23  -IOP WNL today  Retina detachment and endophthalmitis precautions were discussed with the patient (increased blurry vision, drainage, new flashes, floaters or a curtain in the visual field) and was asked to return if any of the those occur    #T2DM   - HbA1c 6.9% (12/2022)  - Blood pressure (<120/80) and blood glucose (HbA1c <7.0, ~6.5 today) control discussed with patient. Patient advised that failure to adequately control each may lead to vision loss. The patient expressed understanding.    # Diabetic macular edema both eyes   - status post avastin in both eyes; Switch to Eylea 4/24/19 with improvement of Diabetic macular edema     - last Eylea left eye was 12/21/22.  - Last Eylea right eye was 3/2/23.   - Tried holding off on  injection 3/29/23, but edema worsened both eyes  - 05/03/23 Diabetic macular edema  L>R  05/03/23  - Plan for Eylea injection both eyes today 05/03/23    # Proliferative diabetic retinopathy both eyes   # pre-proliferative diabetic retinopathy right eye    # new vitreous hemorrhage left eye 10/12/22   Status post  Eylea inj left eye   Status post  Panretinal laser photocoagulation (PRP) 9.28.22 left eye and Status post scatter PRP right eye 11/02/22     # Dry eye syndrome   Left eye worse than right eye   warm compresses and artificial tears  As needed  - punctal plugs previously left eye - reports this is better     # Status post liver transplant  - tx 11/2019  - severe anemia; s/p transfusion - anemia much improved   - being seen by his primary team    PLAN:  - Eylea both eyes today (05/03/23).  - Follow up in 4 weeks with Optical Coherence Tomography, dilation  Consider observing right eye if Diabetic macular edema resolving  Consider changing Eylea to ozurdex left eye  inj       Romario Jara MD  Ophthalmology, PGY-3    ~~~~~~~~~~~~~~~~~~~~~~~~~~~~~~~~~~   Complete documentation of historical and exam elements from today's encounter can be found in the full encounter summary report (not reduplicated in this progress note).  I personally obtained the chief complaint(s) and history of present illness.  I confirmed and edited as necessary the review of systems, past medical/surgical history, family history, social history, and examination findings as documented by others; and I examined the patient myself.  I personally reviewed the relevant tests, images, and reports as documented above.  I personally reviewed the ophthalmic test(s) associated with this encounter, agree with the interpretation(s) as documented by the resident/fellow, and have edited the corresponding report(s) as necessary.   I formulated and edited as necessary the assessment and plan and discussed the findings and management plan with the  patient and family and No resident or fellow assisted with the procedures performed.  I performed the procedures myself.    Enriqueta Martin MD   of Ophthalmology.  Retina Service   Department of Ophthalmology and Visual Neurosciences   [facility]  Phone: [phone]   Fax: [phone]

## 2023-05-03 NOTE — LETTER
5/3/2023         RE: Frandy Workman  [address] [address]        Dear Colleague,    Thank you for referring your patient, Frandy Workman, to the [facility] [place]. Please see a copy of my visit note below.    Virtual Visit Details    Type of service:  Video Visit   Video Start Time: 1:04 PM  Video End Time:1:35 PM    Originating Location (pt. 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  He has problems with daytime fatigue.  He has gained weight in the last few years.  He does recall a sleep study done maybe 6 years ago at [facility] after he was told about some snoring in the hospital.  He was no significant sleep apnea during that study.  He has since gained maybe 40 pounds.    He typically goes into bed to sleep around 11 PM.  He is not currently taking any sleep aids.  He has been on Ambien in the past.  Once he falls asleep he does wake up every couple of hours or so to go to the bathroom.  Often he will have some difficulty returning to sleep.  He thinks he gets about 6 to 7 hours of sleep at night.  He sets his alarm for 8 AM in order to take medications.  Sometimes he will go back to sleep for another hour or 2.  This happens maybe 3-4 times a week.  He also does take naps during the day typically an hour nap maybe 6 out of 7 days.    He denies waking up with gasping or choking or shortness of breath.  He has had some palpitations occasionally.  He does wake up with dry mouth which she thinks is medication related.  No sore throat or headaches.    Does have occasional motor restlessness symptoms as he is  trying to fall asleep.  These can occasionally impact his ability to fall asleep.  He also has vivid dreaming but he does not wake up with screaming and nightmares.  No history of sleepwalking, sleep talking or dream enactment behavior.    He is not drinking any alcohol or caffeinated beverages.      He does have a family history of obstructive sleep apnea    He is edentulous.    Curlew Sleepiness Scale   Sitting and reading: Slight chance of dozing   Watching TV: Slight chance of dozing   Sitting, inactive in a public place (e.g. a theatre or a meeting): Would never doze   As a passenger in a car for an hour without a break: Slight chance of dozing   Lying down to rest in the afternoon when circumstances permit: Moderate chance of dozing   Sitting and talking to someone: Would never doze   Sitting quietly after a lunch without alcohol: Slight chance of dozing   In a car, while stopped for a few minutes in traffic: Would never doze   Total score - Curlew: 6   (Less than 10 normal)    Insomnia Severity Scale  COREY Total Score: 15  Total score categories:  0-7 = No clinically significant insomnia   8-14 = Subthreshold insomnia   15-21 = Clinical insomnia (moderate severity)  22-28 = Clinical insomnia (severe)    STOP-BANG  Loud Snore   ?  Excessively Tired/Sleepy   1  Observed apnea   ?  Hypertension   1  BMI> 35 kg/m2   0  Age >50   1  Neck >16 in/40cm   ?  Male Gender   1  Total =   4  (0-2 low, 3-4 intermediate, 5-8 high risk of ARIELA)      Past Medical History:   Diagnosis Date     Anemia 2013     Arthritis      BPH (benign prostatic hyperplasia)      CAD (coronary artery disease) 4/1/2019     Cholelithiasis      Conductive hearing loss 08/16/2017     Depressive disorder 1986    Suffer effects throughout life     Gastroesophageal reflux disease 12/01/2014     HCC (hepatocellular carcinoma) (H) 1/22/2019     History of diabetic retinopathy 07/2018     HTN (hypertension)      HTN (hypertension) 11/20/2019      Hyperlipidemia      Liver cirrhosis secondary to ESTRADA (H)      Liver transplanted (H) 11/11/2019     Portal vein thrombosis 4/11/2019     Type II diabetes mellitus (H)        Allergies   Allergen Reactions     Codeine Other (See Comments)     Cannot take due to liver  Cannot tolerate oral narcotics     Seasonal Allergies      Sneezing, coughing, runny and itchy eyes       Current Outpatient Medications   Medication     acetaminophen (TYLENOL) 325 MG tablet     ammonium lactate (AMLACTIN) 12 % external cream     aspirin (SM ASPIRIN ADULT LOW STRENGTH) 81 MG EC tablet     BD VIKTORIA U/F 32G X 4 MM insulin pen needle     benzoyl peroxide 5 % external liquid     Continuous Blood Gluc Sensor (FREESTYLE CLAUDIA 2 SENSOR) MISC     empagliflozin (JARDIANCE) 10 MG TABS tablet     insulin aspart (NOVOLOG PEN) 100 UNIT/ML pen     insulin degludec (TRESIBA FLEXTOUCH) 100 UNIT/ML pen     ketoconazole (NIZORAL) 2 % external shampoo     ketorolac (ACULAR) 0.5 % ophthalmic solution     lamiVUDine (EPIVIR) 100 MG tablet     lisinopril (ZESTRIL) 5 MG tablet     metFORMIN (GLUCOPHAGE XR) 500 MG 24 hr tablet     metoprolol succinate ER (TOPROL XL) 25 MG 24 hr tablet     Multiple Vitamin (TAB-A-GLADIS) TABS     mycophenolate (GENERIC EQUIVALENT) 250 MG capsule     order for DME     pantoprazole (PROTONIX) 40 MG EC tablet     prednisoLONE acetate (PRED FORTE) 1 % ophthalmic suspension     predniSONE (DELTASONE) 5 MG tablet     rosuvastatin (CRESTOR) 20 MG tablet     tamsulosin (FLOMAX) 0.4 MG capsule     Vitamin D3 50 mcg (2000 units) tablet     Current Facility-Administered Medications   Medication     aflibercept (EYLEA) injection prefilled syringe 2 mg     aflibercept (EYLEA) injection prefilled syringe 2 mg       Social History     Socioeconomic History     Marital status:      Spouse name: Not on file     Number of children: Not on file     Years of education: Not on file     Highest education level: Bachelor's degree (e.g., BA, AB,  BS)   Occupational History     Not on file   Tobacco Use     Smoking status: Former     Packs/day: 6.00     Years: 30.00     Pack years: 180.00     Types: Cigars, Cigarettes     Start date: 2016     Quit date: 10/25/2017     Years since quittin.5     Smokeless tobacco: Former     Types: Chew     Quit date: 10/31/2017     Tobacco comments:     1 tin per week   Vaping Use     Vaping status: Not on file   Substance and Sexual Activity     Alcohol use: No     Alcohol/week: 0.0 standard drinks of alcohol     Comment: quit 1996     Drug use: No     Sexual activity: Not Currently     Partners: Female     Birth control/protection: Condom   Other Topics Concern     Parent/sibling w/ CABG, MI or angioplasty before 65F 55M? Yes   Social History Narrative    Prior , [facility]     Social Determinants of Health     Financial Resource Strain: Low Risk  (10/13/2020)    Overall Financial Resource Strain (CARDIA)      Difficulty of Paying Living Expenses: Not very hard   Food Insecurity: No Food Insecurity (10/13/2020)    Hunger Vital Sign      Worried About Running Out of Food in the Last Year: Never true      Ran Out of Food in the Last Year: Never true   Transportation Needs: No Transportation Needs (10/13/2020)    PRAPARE - Transportation      Lack of Transportation (Medical): No      Lack of Transportation (Non-Medical): No   Physical Activity: Insufficiently Active (10/13/2020)    Exercise Vital Sign      Days of Exercise per Week: 1 day      Minutes of Exercise per Session: 60 min   Stress: Stress Concern Present (10/13/2020)    Citizen of [place] of Occupational Health - Occupational Stress Questionnaire      Feeling of Stress : To some extent   Social Connections: Socially Isolated (10/13/2020)    Social Connection and Isolation Panel [NHANES]      Frequency of Communication with Friends and Family: Once a week      Frequency of Social Gatherings with Friends and Family: Once a week      Attends  "Jainism Services: Never      Active Member of Clubs or Organizations: No      Attends Club or Organization Meetings: Never      Marital Status:    Intimate Partner Violence: Not At Risk (3/17/2022)    Humiliation, Afraid, Rape, and Kick questionnaire      Fear of Current or Ex-Partner: No      Emotionally Abused: No      Physically Abused: No      Sexually Abused: No   Housing Stability: Low Risk  (10/13/2020)    Housing Stability Vital Sign      Unable to Pay for Housing in the Last Year: No      Number of Places Lived in the Last Year: 2      Unstable Housing in the Last Year: No       Family History   Problem Relation Age of Onset     Skin Cancer Mother      Cancer Mother      Diabetes Mother          3/2016     Cerebrovascular Disease Mother         Passed away in Feb of this year, 80 years old.     Thyroid Disease Mother      Depression Mother      Colon Cancer Father 60     Pancreatic Cancer Father 60     Prostate Cancer Father      Colorectal Cancer Father      Macular Degeneration Father      Cancer Father      Glaucoma Father      Skin Cancer Father      Asthma Sister         Had since birth     Thyroid Disease Sister      Depression Sister      Colorectal Cancer Maternal Grandmother      Cancer Maternal Grandmother      Substance Abuse Maternal Grandmother         Alcohol     Prostate Cancer Maternal Grandfather      Substance Abuse Maternal Grandfather         Alcohol     Colorectal Cancer Paternal Grandmother      Liver Disease No family hx of      Melanoma No family hx of      Anesthesia Reaction No family hx of      Deep Vein Thrombosis (DVT) No family hx of            EXAM:  BP (!) 140/73   Ht 1.753 m (5' 9\")   Wt 93 kg (205 lb)   BMI 30.27 kg/m    GENERAL: Alert and no distress  EYES: Eyes grossly normal to inspection.  No discharge or erythema, or obvious scleral/conjunctival abnormalities.  RESP: No audible wheeze, cough, or visible cyanosis.  No visible retractions or increased " work of breathing.    SKIN: Visible skin clear. No significant rash, abnormal pigmentation or lesions.  NEURO: Cranial nerves grossly intact.  Mentation and speech appropriate for age.  PSYCH: Mentation appears normal, affect normal, judgement and insight intact, normal speech and appearance well-groomed.       PSG requested from [facility]    TSH   Date Value Ref Range Status   04/25/2022 1.24 0.40 - 4.00 mU/L Final   01/28/2021 0.91 0.40 - 4.00 mU/L Final         ASSESSMENT:  58-year-old gentleman with history of liver transplant for cirrhosis, bipolar disorder, hypertension, chronic kidney disease, weight gain and fatigue.  Overall he is at at least intermediate risk for obstructive sleep apnea.  Identifying and treating obstructive sleep apnea is bemedically indicated.    PLAN:  Recommended in lab polysomnography for sensitive evaluation of possible sleep disordered breathing.  We have requested records from [facility] however he has had significant weight gain so reassessment is indicated.  If patient does have significant sleep apnea he is open to a trial of CPAP.  He is not a good candidate for oral appliances as he is edentulous.  Please see instructions for further details of counseling provided.  He is agreeable with this plan.      48 minutes spent by me on the date of the encounter doing chart review, history and exam, documentation and further activities per the note    Anu Simpson M.D.  Pulmonary/Critical Care/Sleep Medicine    [facility]   Floor [address], [address]   [address] [place], [place] [address]. [place] [address]   Appointments: [phone]    The above note was dictated using voice recognition software and may include typographical errors. Please contact the author for any clarifications.                Again, thank you for allowing me to participate in the care of your patient.        Sincerely,        Anu Simpson MD

## 2023-05-03 NOTE — NURSING NOTE
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          Comments    Pt states no change in VA since last visit  C/o some itching both eyes  States no flashes, floaters or eye pain   LBS: 180     Last A1c :  6.9  Lab Results       Component                Value               Date                       A1C                      6.9                 12/14/2022                 A1C                      6.0                 01/10/2022                 A1C                      6.8                 07/13/2021                 A1C                      6.0                 12/30/2020                 A1C                      6.3                 06/13/2020                 A1C                      6.6                 08/08/2018                 A1C                      6.5                 06/09/2017                 A1C                      7.8                 10/25/2016                Laurel Cristina COT 1:55 PM May 3, 2023

## 2023-05-03 NOTE — NURSING NOTE
Is the patient currently in the state of [place]? YES    Visit mode:VIDEO    If the visit is dropped, the patient can be reconnected by: VIDEO VISIT: Text to cell phone: [phone]    Will anyone else be joining the visit? NO      How would you like to obtain your AVS? MyChart    Are changes needed to the allergy or medication list? NO    Reason for visit: Video Visit        Tre Acosta

## 2023-05-03 NOTE — PATIENT INSTRUCTIONS
"      MY TREATMENT INFORMATION FOR SLEEP APNEA-  Frandy Workman    DOCTOR : Anu Simpson MD      Frequently asked questions:  1. What is Obstructive Sleep Apnea (ARIELA)? ARIELA is the most common type of sleep apnea. Apnea means, \"without breath.\"  Apnea is most often caused by narrowing or collapse of the upper airway as muscles relax during sleep.   Almost everyone has occasional apneas. Most people with sleep apnea have had brief interruptions at night frequently for many years.  The severity of sleep apnea is related to how frequent and severe the events are.   2. What are the consequences of ARIELA? Symptoms include: feeling sleepy during the day, snoring loudly, gasping or stopping of breathing, trouble sleeping, and occasionally morning headaches or heartburn at night.  Sleepiness can be serious and even increase the risk of falling asleep while driving. Other health consequences may include development of high blood pressure and other cardiovascular disease in persons who are susceptible. Untreated ARIELA  can contribute to heart disease, stroke and diabetes.   3. What are the treatment options? In most situations, sleep apnea is a lifelong disease that must be managed with daily therapy. Medications are not effective for sleep apnea and surgery is generally not considered until other therapies have been tried. Your treatment is your choice . Continuous Positive Airway (CPAP) works right away and is the therapy that is effective in nearly everyone. An oral device to hold your jaw forward is usually the next most reliable option. Other options include postioning devices (to keep you off your back), weight loss, and surgery including a tongue pacing device. There is more detail about some of these options below.  4. Are my sleep studies covered by insurance? Although we will request verification of coverage, we advise you also check in advance of the study to ensure there is coverage.    Important tips for " those choosing CPAP and similar devices   Know your equipment:  CPAP is continuous positive airway pressure that prevents obstructive sleep apnea by keeping the throat from collapsing while you are sleeping. In most cases, the device is  smart  and can slowly self-adjusts if your throat collapses and keeps a record every day of how well you are treated-this information is available to you and your care team.  BPAP is bilevel positive airway pressure that keeps your throat open and also assists each breath with a pressure boost to maintain adequate breathing.  Special kinds of BPAP are used in patients who have inadequate breathing from lung or heart disease. In most cases, the device is  smart  and can slowly self-adjusts to assist breathing. Like CPAP, the device keeps a record of how well you are treated.  Your mask is your connection to the device. You get to choose what feels most comfortable and the staff will help to make sure if fits. Here: are some examples of the different masks that are available:       Key points to remember on your journey with sleep apnea:  Sleep study.  PAP devices often need to be adjusted during a sleep study to show that they are effective and adjusted right.  Good tips to remember: Try wearing just the mask during a quiet time during the day so your body adapts to wearing it. A humidifier is recommended for comfort in most cases to prevent drying of your nose and throat. Allergy medication from your provider may help you if you are having nasal congestion.  Getting settled-in. It takes more than one night for most of us to get used to wearing a mask. Try wearing just the mask during a quiet time during the day so your body adapts to wearing it. A humidifier is recommended for comfort in most cases. Our team will work with you carefully on the first day and will be in contact within 4 days and again at 2 and 4 weeks for advice and remote device adjustments. Your therapy is evaluated  by the device each day.   Use it every night. The more you are able to sleep naturally for 7-8 hours, the more likely you will have good sleep and to prevent health risks or symptoms from sleep apnea. Even if you use it 4 hours it helps. Occasionally all of us are unable to use a medical therapy, in sleep apnea, it is not dangerous to miss one night.   Communicate. Call our skilled team on the number provided on the first day if your visit for problems that make it difficult to wear the device. Over 2 out of 3 patients can learn to wear the device long-term with help from our team. Remember to call our team or your sleep providers if you are unable to wear the device as we may have other solutions for those who cannot adapt to mask CPAP therapy. It is recommended that you sleep your sleep provider within the first 3 months and yearly after that if you are not having problems.   Use it for your health. We encourage use of CPAP masks during daytime quiet periods to allow your face and brain to adapt to the sensation of CPAP so that it will be a more natural sensation to awaken to at night or during naps. This can be very useful during the first few weeks or months of adapting to CPAP though it does not help medically to wear CPAP during wakefulness and  should not be used as a strategy just to meet guidelines.  Take care of your equipment. Make sure you clean your mask and tubing using directions every day and that your filter and mask are replaced as recommended or if they are not working.     BESIDES CPAP, WHAT OTHER THERAPIES ARE THERE?    Positioning Device  Positioning devices are generally used when sleep apnea is mild and only occurs on your back.This example shows a pillow that straps around the waist. It may be appropriate for those whose sleep study shows milder sleep apnea that occurs primarily when lying flat on one's back. Preliminary studies have shown benefit but effectiveness at home may need to be  verified by a home sleep test. These devices are generally not covered by medical insurance.  Examples of devices that maintain sleeping on the back to prevent snoring and mild sleep apnea.    Belt type body positioner  [email or web address]/    Electronic reminder  http://nightshifttherapy.com/            Oral Appliance  What is oral appliance therapy?  An oral appliance device fits on your teeth at night like a retainer used after having braces. The device is made by a specialized dentist and requires several visits over 1-2 months before a manufactured device is made to fit your teeth and is adjusted to prevent your sleep apnea. Once an oral device is working properly, snoring should be improved. A home sleep test may be recommended at that time if to determine whether the sleep apnea is adequately treated.       Some things to remember:  -Oral devices are often, but not always, covered by your medical insurance. Be sure to check with your insurance provider.   -If you are referred for oral therapy, you will be given a list of specialized dentists to consider or you may choose to visit the Web site of the American Academy of Dental Sleep Medicine  -Oral devices are less likely to work if you have severe sleep apnea or are extremely overweight.     More detailed information  An oral appliance is a small acrylic device that fits over the upper and lower teeth  (similar to a retainer or a mouth guard). This device slightly moves jaw forward, which moves the base of the tongue forward, opens the airway, improves breathing for effective treat snoring and obstructive sleep apnea in perhaps 7 out of 10 people .  The best working devices are custom-made by a dental device  after a mold is made of the teeth 1, 2, 3.  When is an oral appliance indicated?  Oral appliance therapy is recommended as a first-line treatment for patients with primary snoring, mild sleep apnea, and for patients with moderate sleep apnea  who prefer appliance therapy to use of CPAP4, 5. Severity of sleep apnea is determined by sleep testing and is based on the number of respiratory events per hour of sleep.   How successful is oral appliance therapy?  The success rate of oral appliance therapy in patients with mild sleep apnea is 75-80% while in patients with moderate sleep apnea it is 50-70%. The chance of success in patients with severe sleep apnea is 40-50%. The research also shows that oral appliances have a beneficial effect on the cardiovascular health of ARIELA patients at the same magnitude as CPAP therapy7.  Oral appliances should be a second-line treatment in cases of severe sleep apnea, but if not completely successful then a combination therapy utilizing CPAP plus oral appliance therapy may be effective. Oral appliances tend to be effective in a broad range of patients although studies show that the patients who have the highest success are females, younger patients, those with milder disease, and less severe obesity. 3, 6.   Finding a dentist that practices dental sleep medicine  Specific training is available through the American Academy of Dental Sleep Medicine for dentists interested in working in the field of sleep. To find a dentist who is educated in the field of sleep and the use of oral appliances, near you, visit the Web site of the American Academy of Dental Sleep Medicine.    References  1. Myron, et al. Objectively measured vs self-reported compliance during oral appliance therapy for sleep-disordered breathing. Chest 2013; 144(5): 3633-6402.  2. Rosangela, et al. Objective measurement of compliance during oral appliance therapy for sleep-disordered breathing. Thorax 2013; 68(1): 91-96.  3. Cheryl et al. Mandibular advancement devices in 620 men and women with ARIELA and snoring: tolerability and predictors of treatment success. Chest 2004; 125: 8652-2206.  4. Jonn et al. Oral appliances for snoring and ARIELA: a  review. Sleep 2006; 29: 244-262.  5. Criselda et al. Oral appliance treatment for ARIELA: an update. J Clin Sleep Med 2014; 10(2): 215-227.  6. Chris et al. Predictors of OSAH treatment outcome. J Dent Res 2007; 86: 8981-9460.      Weight Loss:    Weight loss is a long-term strategy that may improve sleep apnea in some patients.    Weight management is a personal decision and the decision should be based on your interest and the potential benefits.  If you are interested in exploring weight loss strategies, the following discussion covers the impact on weight loss on sleep apnea and the approaches that may be successful.    Being overweight does not necessarily mean you will have health consequences.  Those who have BMI over 35 or over 27 with existing medical conditions carries greater risk.   Weight loss decreases severity of sleep apnea in most people with obesity. For those with mild obesity who have developed snoring with weight gain, even 15-30 pound weight loss can improve and occasionally eliminate sleep apnea.  Structured and life-long dietary and health habits are necessary to lose weight and keep healthier weight levels.     Though there may be significant health benefits from weight loss, long-term weight loss is very difficult to achieve- studies show success with dietary management in less than 10% of people. In addition, substantial weight loss may require years of dietary control and may be difficult if patients have severe obesity. In these cases, surgical management may be considered.  Finally, older individuals who have tolerated obesity without health complications may be less likely to benefit from weight loss strategies.          Surgery:    Surgery for obstructive sleep apnea is considered generally only when other therapies fail to work. Surgery may be discussed with you if you are having a difficult time tolerating CPAP and or when there is an abnormal structure that requires surgical  correction.  Nose and throat surgeries often enlarge the airway to prevent collapse.  Most of these surgeries create pain for 1-2 weeks and up to half of the most common surgeries are not effective throughout life.  You should carefully discuss the benefits and drawbacks to surgery with your sleep provider and surgeon to determine if it is the best solution for you.   More information  Surgery for ARIELA is directed at areas that are responsible for narrowing or complete obstruction of the airway during sleep.  There are a wide range of procedures available to enlarge and/or stabilize the airway to prevent blockage of breathing in the three major areas where it can occur: the palate, tongue, and nasal regions.  Successful surgical treatment depends on the accurate identification of the factors responsible for obstructive sleep apnea in each person.  A personalized approach is required because there is no single treatment that works well for everyone.  Because of anatomic variation, consultation with an examination by a sleep surgeon is a critical first step in determining what surgical options are best for each patient.  In some cases, examination during sedation may be recommended in order to guide the selection of procedures.  Patients will be counseled about risks and benefits as well as the typical recovery course after surgery. Surgery is typically not a cure for a person s ARIELA.  However, surgery will often significantly improve one s ARIELA severity (termed  success rate ).  Even in the absence of a cure, surgery will decrease the cardiovascular risk associated with OSA7; improve overall quality of life8 (sleepiness, functionality, sleep quality, etc).      Palate Procedures:  Patients with ARIELA often have narrowing of their airway in the region of their tonsils and uvula.  The goals of palate procedures are to widen the airway in this region as well as to help the tissues resist collapse.  Modern palate procedure  techniques focus on tissue conservation and soft tissue rearrangement, rather than tissue removal.  Often the uvula is preserved in this procedure. Residual sleep apnea is common in patient after pharyngoplasty with an average reduction in sleep apnea events of 33%2.      Tongue Procedures:  ExamWhile patients are awake, the muscles that surround the throat are active and keep this region open for breathing. These muscles relax during sleep, allowing the tongue and other structures to collapse and block breathing.  There are several different tongue procedures available.  Selection of a tongue base procedure depends on characteristics seen on physical exam.  Generally, procedures are aimed at removing bulky tissues in this area or preventing the back of the tongue from falling back during sleep.  Success rates for tongue surgery range from 50-62%3.    Hypoglossal Nerve Stimulation:  Hypoglossal nerve stimulation has recently received approval from the [place] Food and Drug Administration for the treatment of obstructive sleep apnea.  This is based on research showing that the system was safe and effective in treating sleep apnea6.  Results showed that the median AHI score decreased 68%, from 29.3 to 9.0. This therapy uses an implant system that senses breathing patterns and delivers mild stimulation to airway muscles, which keeps the airway open during sleep.  The system consists of three fully implanted components: a small generator (similar in size to a pacemaker), a breathing sensor, and a stimulation lead.  Using a small handheld remote, a patient turns the therapy on before bed and off upon awakening.    Candidates for this device must be greater than 18 years of age, have moderate to severe ARIELA (AHI between 15-65), BMI less than 35, have tried CPAP/oral appliance for at least 8 weeks without success, and have appropriate upper airway anatomy (determined by a sleep endoscopy performed by Dr. Mclaughlin  Krystle.    Hypoglossal Nerve Stimulation Pathway:    The sleep surgeon s office will work with the patient through the insurance prior-authorization process (including communications and appeals).    Nasal Procedures:  Nasal obstruction can interfere with nasal breathing during the day and night.  Studies have shown that relief of nasal obstruction can improve the ability of some patients to tolerate positive airway pressure therapy for obstructive sleep apnea1.  Treatment options include medications such as nasal saline, topical corticosteroid and antihistamine sprays, and oral medications such as antihistamines or decongestants. Non-surgical treatments can include external nasal dilators for selected patients. If these are not successful by themselves, surgery can improve the nasal airway either alone or in combination with these other options.      Combination Procedures:  Combination of surgical procedures and other treatments may be recommended, particularly if patients have more than one area of narrowing or persistent positional disease.  The success rate of combination surgery ranges from 66-80%2,3.    References  Emi PHELAN. The Role of the Nose in Snoring and Obstructive Sleep Apnoea: An Update.  Eur Arch Otorhinolaryngol. 2011; 268: 1365-73.   Pam SM; Meredith JA; Issac JR; Pallanch JF; Les MB; Naga SG; Jan MELOD. Surgical modifications of the upper airway for obstructive sleep apnea in adults: a systematic review and meta-analysis. SLEEP 2010;33(10):4808-1164. Zoila MERRILL. Hypopharyngeal surgery in obstructive sleep apnea: an evidence-based medicine review.  Arch Otolaryngol Head Neck Surg. 2006 Feb;132(2):206-13.  Randolph YH1, Kaylin Y, Bentley ZULLY. The efficacy of anatomically based multilevel surgery for obstructive sleep apnea. Otolaryngol Head Neck Surg. 2003 Oct;129(4):327-35.  Zoila MERRILL, Goldberg A. Hypopharyngeal Surgery in Obstructive Sleep Apnea: An Evidence-Based Medicine Review. Arch  Otolaryngol Head Neck Surg. 2006 Feb;132(2):206-13.  Emre PJ et al. Upper-Airway Stimulation for Obstructive Sleep Apnea.  N Engl J Med. 2014 Jan 9;370(2):139-49.  Reina Y et al. Increased Incidence of Cardiovascular Disease in Middle-aged Men with Obstructive Sleep Apnea. Am J Respir Crit Care Med; 2002 166: 159-165  Terrell EM et al. Studying Life Effects and Effectiveness of Palatopharyngoplasty (SLEEP) study: Subjective Outcomes of Isolated Uvulopalatopharyngoplasty. Otolaryngol Head Neck Surg. 2011; 144: 623-631.        WHAT IF I ONLY HAVE SNORING?    Mandibular advancement devices, lateral sleep positioning, long-term weight loss and treatment of nasal allergies have been shown to improve snoring.  Exercising tongue muscles with a game (https://apps.[employer]/us/yuval/soundly-reduce-snoring/bk1175652563) or stimulating the tongue during the day with a device (https://doi.org/10.3390/ecm04829173) have improved snoring in some individuals.    Remember to Drive Safe... Drive Alive     Sleep health profoundly affects your health, mood, and your safety.  Thirty three percent of the population (one in three of us) is not getting enough sleep and many have a sleep disorder. Not getting enough sleep or having an untreated / undertreated sleep condition may make us sleepy without even knowing it. In fact, our driving could be dramatically impaired due to our sleep health. As your provider, here are some things I would like you to know about driving:     Here are some warning signs for impairment and dangerous drowsy driving:              -Having been awake more than 16 hours               -Looking tired               -Eyelid drooping              -Head nodding (it could be too late at this point)              -Driving for more than 30 minutes     Some things you could do to make the driving safer if you are experiencing some drowsiness:              -Stop driving and rest              -Call for transportation               -Make sure your sleep disorder is adequately treated     Some things that have been shown NOT to work when experiencing drowsiness while driving:              -Turning on the radio              -Opening windows              -Eating any  distracting  /  entertaining  foods (e.g., sunflower seeds, candy, or any other)              -Talking on the phone      Your decision may not only impact your life, but also the life of others. Please, remember to drive safe for yourself and all of us.

## 2023-05-03 NOTE — PROGRESS NOTES
Virtual Visit Details    Type of service:  Video Visit   Video Start Time: 1:04 PM  Video End Time:1:35 PM    Originating Location (pt. 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  He has problems with daytime fatigue.  He has gained weight in the last few years.  He does recall a sleep study done maybe 6 years ago at [facility] after he was told about some snoring in the hospital.  He was no significant sleep apnea during that study.  He has since gained maybe 40 pounds.    He typically goes into bed to sleep around 11 PM.  He is not currently taking any sleep aids.  He has been on Ambien in the past.  Once he falls asleep he does wake up every couple of hours or so to go to the bathroom.  Often he will have some difficulty returning to sleep.  He thinks he gets about 6 to 7 hours of sleep at night.  He sets his alarm for 8 AM in order to take medications.  Sometimes he will go back to sleep for another hour or 2.  This happens maybe 3-4 times a week.  He also does take naps during the day typically an hour nap maybe 6 out of 7 days.    He denies waking up with gasping or choking or shortness of breath.  He has had some palpitations occasionally.  He does wake up with dry mouth which she thinks is medication related.  No sore throat or headaches.    Does have occasional motor restlessness symptoms as he is trying to fall asleep.  These can occasionally impact his ability to fall asleep.  He also has vivid dreaming but he does not wake up with screaming and nightmares.  No history of sleepwalking, sleep talking or dream enactment behavior.    He is not drinking any  alcohol or caffeinated beverages.      He does have a family history of obstructive sleep apnea    He is edentulous.    Harvard Sleepiness Scale   Sitting and reading: Slight chance of dozing   Watching TV: Slight chance of dozing   Sitting, inactive in a public place (e.g. a theatre or a meeting): Would never doze   As a passenger in a car for an hour without a break: Slight chance of dozing   Lying down to rest in the afternoon when circumstances permit: Moderate chance of dozing   Sitting and talking to someone: Would never doze   Sitting quietly after a lunch without alcohol: Slight chance of dozing   In a car, while stopped for a few minutes in traffic: Would never doze   Total score - Harvard: 6   (Less than 10 normal)    Insomnia Severity Scale  COREY Total Score: 15  Total score categories:  0-7 = No clinically significant insomnia   8-14 = Subthreshold insomnia   15-21 = Clinical insomnia (moderate severity)  22-28 = Clinical insomnia (severe)    STOP-BANG  Loud Snore   ?  Excessively Tired/Sleepy   1  Observed apnea   ?  Hypertension   1  BMI> 35 kg/m2   0  Age >50   1  Neck >16 in/40cm   ?  Male Gender   1  Total =   4  (0-2 low, 3-4 intermediate, 5-8 high risk of ARIELA)      Past Medical History:   Diagnosis Date     Anemia 2013     Arthritis      BPH (benign prostatic hyperplasia)      CAD (coronary artery disease) 4/1/2019     Cholelithiasis      Conductive hearing loss 08/16/2017     Depressive disorder 1986    Suffer effects throughout life     Gastroesophageal reflux disease 12/01/2014     HCC (hepatocellular carcinoma) (H) 1/22/2019     History of diabetic retinopathy 07/2018     HTN (hypertension)      HTN (hypertension) 11/20/2019     Hyperlipidemia      Liver cirrhosis secondary to ESTRADA (H)      Liver transplanted (H) 11/11/2019     Portal vein thrombosis 4/11/2019     Type II diabetes mellitus (H)        Allergies   Allergen Reactions     Codeine Other (See Comments)     Cannot take due to  liver  Cannot tolerate oral narcotics     Seasonal Allergies      Sneezing, coughing, runny and itchy eyes       Current Outpatient Medications   Medication     acetaminophen (TYLENOL) 325 MG tablet     ammonium lactate (AMLACTIN) 12 % external cream     aspirin (SM ASPIRIN ADULT LOW STRENGTH) 81 MG EC tablet     BD VIKTORIA U/F 32G X 4 MM insulin pen needle     benzoyl peroxide 5 % external liquid     Continuous Blood Gluc Sensor (FREESTYLE CLAUDIA 2 SENSOR) MISC     empagliflozin (JARDIANCE) 10 MG TABS tablet     insulin aspart (NOVOLOG PEN) 100 UNIT/ML pen     insulin degludec (TRESIBA FLEXTOUCH) 100 UNIT/ML pen     ketoconazole (NIZORAL) 2 % external shampoo     ketorolac (ACULAR) 0.5 % ophthalmic solution     lamiVUDine (EPIVIR) 100 MG tablet     lisinopril (ZESTRIL) 5 MG tablet     metFORMIN (GLUCOPHAGE XR) 500 MG 24 hr tablet     metoprolol succinate ER (TOPROL XL) 25 MG 24 hr tablet     Multiple Vitamin (TAB-A-GLADIS) TABS     mycophenolate (GENERIC EQUIVALENT) 250 MG capsule     order for DME     pantoprazole (PROTONIX) 40 MG EC tablet     prednisoLONE acetate (PRED FORTE) 1 % ophthalmic suspension     predniSONE (DELTASONE) 5 MG tablet     rosuvastatin (CRESTOR) 20 MG tablet     tamsulosin (FLOMAX) 0.4 MG capsule     Vitamin D3 50 mcg (2000 units) tablet     Current Facility-Administered Medications   Medication     aflibercept (EYLEA) injection prefilled syringe 2 mg     aflibercept (EYLEA) injection prefilled syringe 2 mg       Social History     Socioeconomic History     Marital status:      Spouse name: Not on file     Number of children: Not on file     Years of education: Not on file     Highest education level: Bachelor's degree (e.g., BA, AB, BS)   Occupational History     Not on file   Tobacco Use     Smoking status: Former     Packs/day: 6.00     Years: 30.00     Pack years: 180.00     Types: Cigars, Cigarettes     Start date: 2016     Quit date: 10/25/2017     Years since quittin.5      Smokeless tobacco: Former     Types: Chew     Quit date: 10/31/2017     Tobacco comments:     1 tin per week   Vaping Use     Vaping status: Not on file   Substance and Sexual Activity     Alcohol use: No     Alcohol/week: 0.0 standard drinks of alcohol     Comment: quit Sept. 1996     Drug use: No     Sexual activity: Not Currently     Partners: Female     Birth control/protection: Condom   Other Topics Concern     Parent/sibling w/ CABG, MI or angioplasty before 65F 55M? Yes   Social History Narrative    Prior , [employer] [facility]     Social Determinants of Health     Financial Resource Strain: Low Risk  (10/13/2020)    Overall Financial Resource Strain (CARDIA)      Difficulty of Paying Living Expenses: Not very hard   Food Insecurity: No Food Insecurity (10/13/2020)    Hunger Vital Sign      Worried About Running Out of Food in the Last Year: Never true      Ran Out of Food in the Last Year: Never true   Transportation Needs: No Transportation Needs (10/13/2020)    PRAPARE - Transportation      Lack of Transportation (Medical): No      Lack of Transportation (Non-Medical): No   Physical Activity: Insufficiently Active (10/13/2020)    Exercise Vital Sign      Days of Exercise per Week: 1 day      Minutes of Exercise per Session: 60 min   Stress: Stress Concern Present (10/13/2020)    [employer] [place] [employer] - Occupational Stress Questionnaire      Feeling of Stress : To some extent   Social Connections: Socially Isolated (10/13/2020)    Social Connection and Isolation Panel [NHANES]      Frequency of Communication with Friends and Family: Once a week      Frequency of Social Gatherings with Friends and Family: Once a week      Attends Islam Services: Never      Active Member of Clubs or Organizations: No      Attends Club or Organization Meetings: Never      Marital Status:    Intimate Partner Violence: Not At Risk (3/17/2022)    Humiliation, Afraid, Rape, and Kick questionnaire       "Fear of Current or Ex-Partner: No      Emotionally Abused: No      Physically Abused: No      Sexually Abused: No   Housing Stability: Low Risk  (10/13/2020)    Housing Stability Vital Sign      Unable to Pay for Housing in the Last Year: No      Number of Places Lived in the Last Year: 2      Unstable Housing in the Last Year: No       Family History   Problem Relation Age of Onset     Skin Cancer Mother      Cancer Mother      Diabetes Mother          3/2016     Cerebrovascular Disease Mother         Passed away in Feb of this year, 80 years old.     Thyroid Disease Mother      Depression Mother      Colon Cancer Father 60     Pancreatic Cancer Father 60     Prostate Cancer Father      Colorectal Cancer Father      Macular Degeneration Father      Cancer Father      Glaucoma Father      Skin Cancer Father      Asthma Sister         Had since birth     Thyroid Disease Sister      Depression Sister      Colorectal Cancer Maternal Grandmother      Cancer Maternal Grandmother      Substance Abuse Maternal Grandmother         Alcohol     Prostate Cancer Maternal Grandfather      Substance Abuse Maternal Grandfather         Alcohol     Colorectal Cancer Paternal Grandmother      Liver Disease No family hx of      Melanoma No family hx of      Anesthesia Reaction No family hx of      Deep Vein Thrombosis (DVT) No family hx of            EXAM:  BP (!) 140/73   Ht 1.753 m (5' 9\")   Wt 93 kg (205 lb)   BMI 30.27 kg/m    GENERAL: Alert and no distress  EYES: Eyes grossly normal to inspection.  No discharge or erythema, or obvious scleral/conjunctival abnormalities.  RESP: No audible wheeze, cough, or visible cyanosis.  No visible retractions or increased work of breathing.    SKIN: Visible skin clear. No significant rash, abnormal pigmentation or lesions.  NEURO: Cranial nerves grossly intact.  Mentation and speech appropriate for age.  PSYCH: Mentation appears normal, affect normal, judgement and insight intact, " normal speech and appearance well-groomed.       PSG requested from [facility]    TSH   Date Value Ref Range Status   04/25/2022 1.24 0.40 - 4.00 mU/L Final   01/28/2021 0.91 0.40 - 4.00 mU/L Final         ASSESSMENT:  58-year-old gentleman with history of liver transplant for cirrhosis, bipolar disorder, hypertension, chronic kidney disease, weight gain and fatigue.  Overall he is at at least intermediate risk for obstructive sleep apnea.  Identifying and treating obstructive sleep apnea is bemedically indicated.    PLAN:  Recommended in lab polysomnography for sensitive evaluation of possible sleep disordered breathing.  We have requested records from [facility] however he has had significant weight gain so reassessment is indicated.  If patient does have significant sleep apnea he is open to a trial of CPAP.  He is not a good candidate for oral appliances as he is edentulous.  Please see instructions for further details of counseling provided.  He is agreeable with this plan.      48 minutes spent by me on the date of the encounter doing chart review, history and exam, documentation and further activities per the note    Anu Simpson M.D.  Pulmonary/Critical Care/Sleep Medicine    [facility]   Floor 1, [address]   [address] [place] [address]. [place] [address]   Appointments: [phone]    The above note was dictated using voice recognition software and may include typographical errors. Please contact the author for any clarifications.

## 2023-05-04 ENCOUNTER — OFFICE VISIT (OUTPATIENT)
Dept: DERMATOLOGY | Facility: CLINIC | Age: 58
End: 2023-05-04
Payer: MEDICARE

## 2023-05-04 DIAGNOSIS — Z94.4 HISTORY OF LIVER TRANSPLANT (H): ICD-10-CM

## 2023-05-04 DIAGNOSIS — Z12.83 SKIN EXAM FOR MALIGNANT NEOPLASM: ICD-10-CM

## 2023-05-04 DIAGNOSIS — D48.9 NEOPLASM OF UNCERTAIN BEHAVIOR: ICD-10-CM

## 2023-05-04 DIAGNOSIS — N18.32 STAGE 3B CHRONIC KIDNEY DISEASE (CKD) (H): ICD-10-CM

## 2023-05-04 DIAGNOSIS — L82.0 INFLAMED SEBORRHEIC KERATOSIS: Primary | ICD-10-CM

## 2023-05-04 PROCEDURE — 99213 OFFICE O/P EST LOW 20 MIN: CPT | Mod: 25 | Performed by: DERMATOLOGY

## 2023-05-04 PROCEDURE — 11103 TANGNTL BX SKIN EA SEP/ADDL: CPT | Mod: XS | Performed by: DERMATOLOGY

## 2023-05-04 PROCEDURE — 17110 DESTRUCTION B9 LES UP TO 14: CPT | Mod: GC | Performed by: DERMATOLOGY

## 2023-05-04 PROCEDURE — 11102 TANGNTL BX SKIN SINGLE LES: CPT | Mod: XS | Performed by: DERMATOLOGY

## 2023-05-04 PROCEDURE — 88305 TISSUE EXAM BY PATHOLOGIST: CPT | Mod: TC | Performed by: DERMATOLOGY

## 2023-05-04 PROCEDURE — 88305 TISSUE EXAM BY PATHOLOGIST: CPT | Mod: 26 | Performed by: DERMATOLOGY

## 2023-05-04 NOTE — NURSING NOTE
Lidocaine-epinephrine 1-1:072156 % injection   3mL once for one use, starting 5/4/2023 ending 5/4/2023,  2mL disp, R-0, injection  Injected by Dr. Mckeon

## 2023-05-04 NOTE — NURSING NOTE
Dermatology Rooming Note    Frandy Workman's goals for this visit include:   Chief Complaint   Patient presents with     Skin Check     FBSE, Mohs in February     Eulalia Nunez LPN

## 2023-05-04 NOTE — PROGRESS NOTES
[facility] Dermatology Note  Encounter Date: May 4, 2023  Office Visit     Dermatology Problem List:  # Hx transplant (liver, 11/11/2019), on IMSP (mycophenolate 500mg BID, prednisone 5mg daily)   # Hx of NMSC   - SCCis, Left temporal scalp, s/p MMS 2/15/23   # Folliculitis. BPO wash  # Versicolor. Ketoconazole wash  # NUB, x4: right cutaneous upper lip, right medial cheek, left eyebrow, left lateral cheek. DDx: r/o BCC vs seborrheic hyperplasia vs folliculitis. S/p shave 5/4/23  ____________________________________________    Assessment & Plan:     # Neoplasm of uncertain behavior, x 4: right cutaneous upper lip, right medial cheek, left eyebrow, left lateral cheek. DDx: r/o BCC vs seborrheic hyperplasia vs folliculitis.    - Shave biopsy x 4 today (see procedure note below)     # Monitoring lesion: R nasal bridge. Photo in chart from 6 months prior.   -  Evaluated; appears resolved today     # Benign nevi. - Discussed reassuring features and benign nature of diagnosis. No further management at this time    # Inflamed seborrheic keratoses, left temporal scalp. Discussed the natural history and benign nature of this lesion. Reassurance provided that no additional treatment is necessary however will treat today for symptomatic relief, as well as the possibility for hypertrophic AK.   - Cryotherapy x 2 today (see procedure note below)     # Seborrheic keratoses, non-irritated. Discussed the natural history and benign nature of this lesion. Reassurance provided that no additional treatment is necessary unless lesions become bothersome in the future.     # Hx of NMSC. UBSE performed today, NERD.   - SCCis, Left temporal scalp, s/p MMS 2/15/23   - Continue sun protection (SPF 30-50+)     Procedures Performed:   - Shave biopsy procedure note, location(s): x 4: right cutaneous upper lip, right medial cheek, left eyebrow, left lateral cheek. After discussion of benefits and risks including but not  limited to bleeding, infection, scar, incomplete removal, recurrence, and non-diagnostic biopsy, written consent and photographs were obtained. The area was cleaned with isopropyl alcohol. 0.5mL of 1% lidocaine with epinephrine was injected to obtain adequate anesthesia of lesion(s). Shave biopsy at site(s) performed. Hemostasis was achieved with aluminium chloride. Petrolatum ointment and a sterile dressing were applied. The patient tolerated the procedure and no complications were noted. The patient was provided with verbal and written post care instructions.     Cryotherapy procedure note: After verbal consent and discussion of risks and benefits including but no limited to dyspigmentation/scar, blister, and pain, one lesion was treated with 1-2mm freeze border for 2 cycles with liquid nitrogen. Post cryotherapy instructions were provided.         [facility]     Staff and Resident:     Nereida Mckeon MD PGY4    Patient was staffed with Dr. Gamez     Patient was seen and examined with the dermatology resident. I agree with the history, review of systems, physical examination, assessments and plan. I was present for the key portion of the shave biopsy procedures.  I was present for the entire cryotherapy procedure.    Hellen Gamez MD  Professor and  Chair  Department of Dermatology  [facility]  ____________________________________________    CC: Skin Check    HPI:  Mr. Frandy Workman is a(n) 58 year old male who presents today as a return patient for full body skin exam. He has a history of liver transplant (2019) and is on immunosuppressive medications (mycophenolate 500mg BID, prednisone 5mg daily). Today, he reports no new or changing lesions, however when questioned during exam, he does report tenderness associated with new papules on the face. Patient is otherwise feeling well, without additional skin concerns.    Labs Reviewed:  N/A    Physical Exam:  Vitals:  There were no vitals taken for this visit.  SKIN: Total skin excluding the undergarment areas was performed. The exam included the head/face, neck, both arms, chest, back, abdomen, both legs, digits and/or nails.   - There are pink shiny papules with central vessels located on right medial cheek, left lateral / temporal cheek, and two shiny papules with hemorrhagic crust centreally on the right upper cutaneous lip and left eyebrow.   - Multiple regular brown pigmented macules and papules, as well as waxy stuck on tan to brown papules, are identified on the trunk and extremities.   - There is no erythema, telangectasias, nodularity, or pigmentation on the linear scar located at left temporal scalp.   - Adjacent to scar are two gritty crusted plaques with background erythema   - No other lesions of concern on areas examined.     Medications:  Current Outpatient Medications   Medication     acetaminophen (TYLENOL) 325 MG tablet     ammonium lactate (AMLACTIN) 12 % external cream     aspirin (SM ASPIRIN ADULT LOW STRENGTH) 81 MG EC tablet     BD VIKTORIA U/F 32G X 4 MM insulin pen needle     benzoyl peroxide 5 % external liquid     Continuous Blood Gluc Sensor (FREESTYLE CLAUDIA 2 SENSOR) MISC     empagliflozin (JARDIANCE) 10 MG TABS tablet     insulin aspart (NOVOLOG PEN) 100 UNIT/ML pen     insulin degludec (TRESIBA FLEXTOUCH) 100 UNIT/ML pen     ketoconazole (NIZORAL) 2 % external shampoo     ketorolac (ACULAR) 0.5 % ophthalmic solution     lamiVUDine (EPIVIR) 100 MG tablet     lisinopril (ZESTRIL) 5 MG tablet     metFORMIN (GLUCOPHAGE XR) 500 MG 24 hr tablet     metoprolol succinate ER (TOPROL XL) 25 MG 24 hr tablet     Multiple Vitamin (TAB-A-GLADIS) TABS     mycophenolate (GENERIC EQUIVALENT) 250 MG capsule     order for DME     pantoprazole (PROTONIX) 40 MG EC tablet     prednisoLONE acetate (PRED FORTE) 1 % ophthalmic suspension     predniSONE (DELTASONE) 5 MG tablet     rosuvastatin (CRESTOR) 20 MG tablet      tamsulosin (FLOMAX) 0.4 MG capsule     Vitamin D3 50 mcg (2000 units) tablet     Current Facility-Administered Medications   Medication     aflibercept (EYLEA) injection prefilled syringe 2 mg     aflibercept (EYLEA) injection prefilled syringe 2 mg     dexamethasone (OZURDEX) intravitreal implant 0.7 mg     dexamethasone (OZURDEX) intravitreal implant 0.7 mg     lidocaine (PF) (XYLOCAINE) injection 1 mL      Past Medical History:   Patient Active Problem List   Diagnosis     Bipolar affective disorder in remission (H)     Esophageal varices determined by endoscopy (H)     Brow ptosis     Paralytic lagophthalmos of right upper eyelid     Erectile dysfunction due to diseases classified elsewhere     HCC (hepatocellular carcinoma) (H)     Equivocal stress echocardiogram     Type 2 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, unspecified whether long term insulin use (H)     Status post coronary angiogram     CAD (coronary artery disease)     Liver transplant recipient (H)     Status post liver transplantation (H)     Immunosuppressed status (H)     Benign essential hypertension     Malnutrition related to chronic disease (H)     Hypophosphatasia     Chronic kidney disease, stage 3a (H)     Malnutrition (H)     Anxiety     Mild recurrent major depression (H)     Anemia of chronic renal failure, stage 3a (H)     Rekha (H)     History of coronary artery disease     Dyslipidemia     Constipation, unspecified constipation type     Excessive sweating     Stage 3b chronic kidney disease (H)     Anemia of chronic renal failure, stage 3b (H)     NSTEMI (non-ST elevated myocardial infarction) (H)     Chest pain, unspecified type     Age-related nuclear cataract of left eye     Hypertensive chronic kidney disease with stage 5 chronic kidney disease or end stage renal disease (H)     Vitreous hemorrhage of left eye (H)     Proliferative diabetic retinopathy of both eyes associated  with type 2 diabetes mellitus, unspecified proliferative retinopathy type (H)     Past Medical History:   Diagnosis Date     Anemia 2013     Arthritis      BPH (benign prostatic hyperplasia)      CAD (coronary artery disease) 04/01/2019     Cholelithiasis      Conductive hearing loss 08/16/2017     Depressive disorder 1986    Suffer effects throughout life     Gastroesophageal reflux disease 12/01/2014     HCC (hepatocellular carcinoma) (H) 01/22/2019     History of diabetic retinopathy 07/2018     HTN (hypertension) 11/20/2019     Hyperlipidemia      Liver cirrhosis secondary to ESTRADA (H)      Liver transplanted (H) 11/11/2019     Portal vein thrombosis 04/11/2019     Type II diabetes mellitus (H)

## 2023-05-04 NOTE — LETTER
5/4/2023       RE: Frandy Workman  [address] [address]     Dear Colleague,    Thank you for referring your patient, Frandy Workman, to the [facility] DERMATOLOGY CLINIC [place] at [facility]. Please see a copy of my visit note below.    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 BPO wash  # Versicolor. Ketoconazole wash  # NUB, x4: right cutaneous upper lip, right medial cheek, left eyebrow, left lateral cheek. DDx: r/o BCC vs seborrheic hyperplasia vs folliculitis. S/p shave 5/4/23  ____________________________________________    Assessment & Plan:     # Neoplasm of uncertain behavior, x 4: right cutaneous upper lip, right medial cheek, left eyebrow, left lateral cheek. DDx: r/o BCC vs seborrheic hyperplasia vs folliculitis.    - Shave biopsy x 4 today (see procedure note below)     # Monitoring lesion: R nasal bridge. Photo in chart from 6 months prior.   -  Evaluated; appears resolved today     # Benign nevi. - Discussed reassuring features and benign nature of diagnosis. No further management at this time    # Inflamed seborrheic keratoses, left temporal scalp. Discussed the natural history and benign nature of this lesion. Reassurance provided that no additional treatment is necessary however will treat today for symptomatic relief, as well as the possibility for hypertrophic AK.   - Cryotherapy x 2 today (see procedure note below)     # Seborrheic keratoses, non-irritated. Discussed the natural history and benign nature of this lesion. Reassurance provided that no additional treatment is necessary unless lesions become bothersome in the future.     # Hx of NMSC. UBSE performed  today, NERD.   - SCCis, Left temporal scalp, s/p MMS 2/15/23   - Continue sun protection (SPF 30-50+)     Procedures Performed:   - Shave biopsy procedure note, location(s): x 4: right cutaneous upper lip, right medial cheek, left eyebrow, left lateral cheek. After discussion of benefits and risks including but not limited to bleeding, infection, scar, incomplete removal, recurrence, and non-diagnostic biopsy, written consent and photographs were obtained. The area was cleaned with isopropyl alcohol. 0.5mL of 1% lidocaine with epinephrine was injected to obtain adequate anesthesia of lesion(s). Shave biopsy at site(s) performed. Hemostasis was achieved with aluminium chloride. Petrolatum ointment and a sterile dressing were applied. The patient tolerated the procedure and no complications were noted. The patient was provided with verbal and written post care instructions.     Cryotherapy procedure note: After verbal consent and discussion of risks and benefits including but no limited to dyspigmentation/scar, blister, and pain, one lesion was treated with 1-2mm freeze border for 2 cycles with liquid nitrogen. Post cryotherapy instructions were provided.         [facility]     Staff and Resident:     Nereida Mckeon MD PGY4    Patient was staffed with Dr. Gamez     Patient was seen and examined with the dermatology resident. I agree with the history, review of systems, physical examination, assessments and plan. I was present for the key portion of the shave biopsy procedures.  I was present for the entire cryotherapy procedure.    Hellen Gamez MD  Professor and  Chair  Department of Dermatology  [facility]  ____________________________________________    CC: Skin Check    HPI:  Mr. Frandy Workman is a(n) 58 year old male who presents today as a return patient for full body skin exam. He has a history of liver transplant (2019) and is on immunosuppressive medications  (mycophenolate 500mg BID, prednisone 5mg daily). Today, he reports no new or changing lesions, however when questioned during exam, he does report tenderness associated with new papules on the face. Patient is otherwise feeling well, without additional skin concerns.    Labs Reviewed:  N/A    Physical Exam:  Vitals: There were no vitals taken for this visit.  SKIN: Total skin excluding the undergarment areas was performed. The exam included the head/face, neck, both arms, chest, back, abdomen, both legs, digits and/or nails.   - There are pink shiny papules with central vessels located on right medial cheek, left lateral / temporal cheek, and two shiny papules with hemorrhagic crust centreally on the right upper cutaneous lip and left eyebrow.   - Multiple regular brown pigmented macules and papules, as well as waxy stuck on tan to brown papules, are identified on the trunk and extremities.   - There is no erythema, telangectasias, nodularity, or pigmentation on the linear scar located at left temporal scalp.   - Adjacent to scar are two gritty crusted plaques with background erythema   - No other lesions of concern on areas examined.     Medications:  Current Outpatient Medications   Medication    acetaminophen (TYLENOL) 325 MG tablet    ammonium lactate (AMLACTIN) 12 % external cream    aspirin (SM ASPIRIN ADULT LOW STRENGTH) 81 MG EC tablet    BD VIKTORIA U/F 32G X 4 MM insulin pen needle    benzoyl peroxide 5 % external liquid    Continuous Blood Gluc Sensor (FREESTYLE CLAUDIA 2 SENSOR) MISC    empagliflozin (JARDIANCE) 10 MG TABS tablet    insulin aspart (NOVOLOG PEN) 100 UNIT/ML pen    insulin degludec (TRESIBA FLEXTOUCH) 100 UNIT/ML pen    ketoconazole (NIZORAL) 2 % external shampoo    ketorolac (ACULAR) 0.5 % ophthalmic solution    lamiVUDine (EPIVIR) 100 MG tablet    lisinopril (ZESTRIL) 5 MG tablet    metFORMIN (GLUCOPHAGE XR) 500 MG 24 hr tablet    metoprolol succinate ER (TOPROL XL) 25 MG 24 hr tablet     Multiple Vitamin (TAB-A-GLADIS) TABS    mycophenolate (GENERIC EQUIVALENT) 250 MG capsule    order for DME    pantoprazole (PROTONIX) 40 MG EC tablet    prednisoLONE acetate (PRED FORTE) 1 % ophthalmic suspension    predniSONE (DELTASONE) 5 MG tablet    rosuvastatin (CRESTOR) 20 MG tablet    tamsulosin (FLOMAX) 0.4 MG capsule    Vitamin D3 50 mcg (2000 units) tablet     Current Facility-Administered Medications   Medication    aflibercept (EYLEA) injection prefilled syringe 2 mg    aflibercept (EYLEA) injection prefilled syringe 2 mg    dexamethasone (OZURDEX) intravitreal implant 0.7 mg    dexamethasone (OZURDEX) intravitreal implant 0.7 mg    lidocaine (PF) (XYLOCAINE) injection 1 mL      Past Medical History:   Patient Active Problem List   Diagnosis    Bipolar affective disorder in remission (H)    Esophageal varices determined by endoscopy (H)    Brow ptosis    Paralytic lagophthalmos of right upper eyelid    Erectile dysfunction due to diseases classified elsewhere    HCC (hepatocellular carcinoma) (H)    Equivocal stress echocardiogram    Type 2 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, unspecified whether long term insulin use (H)    Status post coronary angiogram    CAD (coronary artery disease)    Liver transplant recipient (H)    Status post liver transplantation (H)    Immunosuppressed status (H)    Benign essential hypertension    Malnutrition related to chronic disease (H)    Hypophosphatasia    Chronic kidney disease, stage 3a (H)    Malnutrition (H)    Anxiety    Mild recurrent major depression (H)    Anemia of chronic renal failure, stage 3a (H)    Rekha (H)    History of coronary artery disease    Dyslipidemia    Constipation, unspecified constipation type    Excessive sweating    Stage 3b chronic kidney disease (H)    Anemia of chronic renal failure, stage 3b (H)    NSTEMI (non-ST elevated myocardial infarction) (H)    Chest pain, unspecified type     Age-related nuclear cataract of left eye    Hypertensive chronic kidney disease with stage 5 chronic kidney disease or end stage renal disease (H)    Vitreous hemorrhage of left eye (H)    Proliferative diabetic retinopathy of both eyes associated with type 2 diabetes mellitus, unspecified proliferative retinopathy type (H)     Past Medical History:   Diagnosis Date    Anemia 2013    Arthritis     BPH (benign prostatic hyperplasia)     CAD (coronary artery disease) 04/01/2019    Cholelithiasis     Conductive hearing loss 08/16/2017    Depressive disorder 1986    Suffer effects throughout life    Gastroesophageal reflux disease 12/01/2014    HCC (hepatocellular carcinoma) (H) 01/22/2019    History of diabetic retinopathy 07/2018    HTN (hypertension) 11/20/2019    Hyperlipidemia     Liver cirrhosis secondary to ESTRADA (H)     Liver transplanted (H) 11/11/2019    Portal vein thrombosis 04/11/2019    Type II diabetes mellitus (H)          Deja Mckeon MD

## 2023-05-05 LAB
PATH REPORT.COMMENTS IMP SPEC: NORMAL
PATH REPORT.COMMENTS IMP SPEC: NORMAL
PATH REPORT.FINAL DX SPEC: NORMAL
PATH REPORT.GROSS SPEC: NORMAL
PATH REPORT.MICROSCOPIC SPEC OTHER STN: NORMAL
PATH REPORT.RELEVANT HX SPEC: NORMAL

## 2023-05-06 RX ORDER — LISINOPRIL 10 MG/1
10 TABLET ORAL DAILY
Qty: 90 TABLET | Refills: 3 | Status: ON HOLD | OUTPATIENT
Start: 2023-05-06 | End: 2023-08-16

## 2023-05-16 DIAGNOSIS — E11.3593 PROLIFERATIVE DIABETIC RETINOPATHY OF BOTH EYES ASSOCIATED WITH TYPE 2 DIABETES MELLITUS, UNSPECIFIED PROLIFERATIVE RETINOPATHY TYPE (H): Primary | ICD-10-CM

## 2023-05-17 ENCOUNTER — OFFICE VISIT (OUTPATIENT)
Dept: FAMILY MEDICINE | Facility: CLINIC | Age: 58
End: 2023-05-17
Payer: MEDICARE

## 2023-05-17 ENCOUNTER — ANCILLARY PROCEDURE (OUTPATIENT)
Dept: GENERAL RADIOLOGY | Facility: CLINIC | Age: 58
End: 2023-05-17
Attending: FAMILY MEDICINE
Payer: MEDICARE

## 2023-05-17 ENCOUNTER — OFFICE VISIT (OUTPATIENT)
Dept: DERMATOLOGY | Facility: CLINIC | Age: 58
End: 2023-05-17
Payer: MEDICARE

## 2023-05-17 VITALS
WEIGHT: 210.3 LBS | TEMPERATURE: 98.4 F | OXYGEN SATURATION: 100 % | HEIGHT: 69 IN | SYSTOLIC BLOOD PRESSURE: 131 MMHG | DIASTOLIC BLOOD PRESSURE: 70 MMHG | BODY MASS INDEX: 31.15 KG/M2 | HEART RATE: 83 BPM

## 2023-05-17 DIAGNOSIS — Z48.89 ENCOUNTER FOR POSTOPERATIVE WOUND CHECK: ICD-10-CM

## 2023-05-17 DIAGNOSIS — R07.89 CHEST WALL PAIN: Primary | ICD-10-CM

## 2023-05-17 DIAGNOSIS — R07.89 CHEST WALL PAIN: ICD-10-CM

## 2023-05-17 DIAGNOSIS — L90.5 SCAR: Primary | ICD-10-CM

## 2023-05-17 DIAGNOSIS — N18.32 STAGE 3B CHRONIC KIDNEY DISEASE (H): ICD-10-CM

## 2023-05-17 PROCEDURE — 71101 X-RAY EXAM UNILAT RIBS/CHEST: CPT | Mod: RT | Performed by: RADIOLOGY

## 2023-05-17 PROCEDURE — 0124A COVID-19 BIVALENT 12+ (PFIZER): CPT | Performed by: FAMILY MEDICINE

## 2023-05-17 PROCEDURE — 99213 OFFICE O/P EST LOW 20 MIN: CPT | Mod: 25 | Performed by: FAMILY MEDICINE

## 2023-05-17 PROCEDURE — 91312 COVID-19 BIVALENT 12+ (PFIZER): CPT | Performed by: FAMILY MEDICINE

## 2023-05-17 PROCEDURE — 99024 POSTOP FOLLOW-UP VISIT: CPT | Mod: GC | Performed by: DERMATOLOGY

## 2023-05-17 ASSESSMENT — PATIENT HEALTH QUESTIONNAIRE - PHQ9
SUM OF ALL RESPONSES TO PHQ QUESTIONS 1-9: 6
5. POOR APPETITE OR OVEREATING: MORE THAN HALF THE DAYS

## 2023-05-17 ASSESSMENT — PAIN SCALES - GENERAL: PAINLEVEL: MODERATE PAIN (5)

## 2023-05-17 ASSESSMENT — ANXIETY QUESTIONNAIRES
7. FEELING AFRAID AS IF SOMETHING AWFUL MIGHT HAPPEN: SEVERAL DAYS
GAD7 TOTAL SCORE: 6
1. FEELING NERVOUS, ANXIOUS, OR ON EDGE: SEVERAL DAYS
IF YOU CHECKED OFF ANY PROBLEMS ON THIS QUESTIONNAIRE, HOW DIFFICULT HAVE THESE PROBLEMS MADE IT FOR YOU TO DO YOUR WORK, TAKE CARE OF THINGS AT HOME, OR GET ALONG WITH OTHER PEOPLE: SOMEWHAT DIFFICULT
GAD7 TOTAL SCORE: 6
5. BEING SO RESTLESS THAT IT IS HARD TO SIT STILL: SEVERAL DAYS
6. BECOMING EASILY ANNOYED OR IRRITABLE: SEVERAL DAYS
2. NOT BEING ABLE TO STOP OR CONTROL WORRYING: NOT AT ALL
3. WORRYING TOO MUCH ABOUT DIFFERENT THINGS: NOT AT ALL

## 2023-05-17 NOTE — NURSING NOTE
Chief Complaint   Patient presents with     Scar Management     3 month scar eval on forehead     Oriana ERVIN CMA

## 2023-05-17 NOTE — LETTER
5/17/2023       RE: Frandy Workman  [address] [address]     Dear Colleague,    Thank you for referring your patient, Frandy Workman, to the [facility] DERMATOLOGIC SURGERY CLINIC [place] at [facility]. Please see a copy of my visit note below.    Dermatologic Surgery Clinic Note    May 17, 2023    Dermatology Problem List:  # Hx transplant (liver, 11/11/2019), on IMSP (mycophenolate 500mg BID, prednisone 5mg daily)   # Hx of NMSC   - SCCis, left temporal scalp, s/p MMS 2/15/23   # Folliculitis. BPO wash  # Versicolor. Ketoconazole wash    Subjective: The patient is a 58 year old man who presents today for a wound check after recent dermatologic surgery. No concerns for infection today.    Patient reports uncomplicated healing at recent surgical site on L forehead. Patient is satisfied with the cosmetic appearance and denies symptoms along scar.    Patient recently had a FBSE and had 4 lesions biopsied that all returned benign. He has no additional spots of concern today.    No other associated symptoms, modifying factors, or prior treatments, except when noted above. The patient denies any constitutional symptoms, lymphadenopathy, unintentional weight loss or decreased appetite. No other skin concerns today.    Objective:   Gen: This is a well appearing individual in no acute distress. The patient is alert and oriented x 3.  An exam of the forehead was performed today and visualized the following:  - Well-healed slightly depressed incision on L temporal scalp with mild surrounding erythema  - The surgical site noted above is clean, dry, and intact. There is no surrounding erythema, purulence, or significant tenderness to palpation. No clinical evidence of infection noted today.    Assessment and Plan:     # SCCis, left temporal scalp, s/p MMS 2/15/23   - The patient's surgery site(s) is/are healing very well. No evidence of infection  on examination today.   - The patient should follow up with dermatologic surgery PRN, as well as continue with regular skin exams in general dermatology clinic.    The patient was discussed with and evaluated by attending physician, Arnoldo Lyon MD.    Ha Max MD  Dermatology, PGY-5  Mohs surgery fellow    Attending Attestation  I attest that I discussed the case with the Fellow.  I agree with the plan.   I have reviewed the note and edited it as necessary.    Arnoldo Lyon M.D.  Professor  Director of Dermatologic Surgery  Department of Dermatology  [facility]

## 2023-05-17 NOTE — PROGRESS NOTES
"  Assessment & Plan     Chest wall pain  Negative cxr, he'll call in June if not better, contusion  - XR Ribs & Chest Right G/E 3 Views; Future    Stage 3b chronic kidney disease (H)  [facility]  - [facility]-19 BIVALENT 12+ (PFIZER)      23 minutes spent by me on the date of the encounter doing chart review, history and exam, documentation and further activities per the note    BMI:   Estimated body mass index is 31.06 kg/m  as calculated from the following:    Height as of this encounter: 1.753 m (5' 9\").    Weight as of this encounter: 95.4 kg (210 lb 4.8 oz).     Return in about 3 months (around 8/17/2023).    Lamin Ortiz MD  [facility]   Miller is a 58 year old, presenting for the following health issues:  Follow Up (Pt here for a return appointment with provider. ) and Fall (Pt fell last Sunday 05/14/2023 and still is having pain on R side )    HPI Needs shingrix shot, discussed, [facility]  Rvwd many MD visits since last saw me, those set up this summer, discussed all  Only acute and unaddressed issue is R anterior chest wall pain, pt has fallen by accident (was walking w/  who moved in such a way caused pt to fall), no cht or loc, about a week ago, since then r anterior chest wall pain if lifts shoulder in abduction; no sob or cough or pleurisy    Past Medical History:   Diagnosis Date     Anemia 2013     Arthritis      BPH (benign prostatic hyperplasia)      CAD (coronary artery disease) 04/01/2019     Cholelithiasis      Conductive hearing loss 08/16/2017     Depressive disorder 1986    Suffer effects throughout life     Gastroesophageal reflux disease 12/01/2014     HCC (hepatocellular carcinoma) (H) 01/22/2019     History of diabetic retinopathy 07/2018     HTN (hypertension) 11/20/2019     Hyperlipidemia      Liver cirrhosis secondary to ESTRADA (H)      Liver transplanted (H) " 11/11/2019     Portal vein thrombosis 04/11/2019     Type II diabetes mellitus (H)      Past Surgical History:   Procedure Laterality Date     BYPASS GRAFT ARTERY CORONARY N/A 7/14/2021    Procedure: median sternotomy, on cardiopulmonary bypass, CORONARY ARTERY BYPASS GRAFT (CABG) x2 with left greater saphenous vein endoscopic harvest and left internal mammery artery harvest;  Surgeon: Tom Zapata MD;  Location: [facility] OR     COLONOSCOPY      2015     COLONOSCOPY N/A 12/6/2019    Procedure: COLONOSCOPY, WITH POLYPECTOMY AND BIOPSY;  Surgeon: Adam Morton MD;  [facility]     CV CENTRAL VENOUS CATHETER PLACEMENT N/A 7/12/2021    Procedure: Central Venous Catheter Placement;  Surgeon: Fermin Polanco MD;  Location:  HEART CARDIAC CATH LAB     CV CORONARY ANGIOGRAM N/A 7/12/2021    Procedure: Coronary Angiogram;  Surgeon: Fermin Polanco MD;  Location:  HEART CARDIAC CATH LAB     CV HEART CATHETERIZATION WITH POSSIBLE INTERVENTION N/A 2/26/2019    Procedure: CORS;  Surgeon: Jagdish Hoyt MD;  Location:  HEART CARDIAC CATH LAB     CV INTRA AORTIC BALLOON N/A 7/12/2021    Procedure: Intra Aortic Balloon Pump Insertion;  Surgeon: Fermin Polanco MD;  Location:  HEART CARDIAC CATH LAB     ESOPHAGOSCOPY, GASTROSCOPY, DUODENOSCOPY (EGD), COMBINED N/A 11/17/2016    Procedure: COMBINED ESOPHAGOSCOPY, GASTROSCOPY, DUODENOSCOPY (EGD);  Surgeon: Santi Rosas MD;  Location:  GI     ESOPHAGOSCOPY, GASTROSCOPY, DUODENOSCOPY (EGD), COMBINED N/A 11/17/2017    Procedure: COMBINED ESOPHAGOSCOPY, GASTROSCOPY, DUODENOSCOPY (EGD);  EGD;  Surgeon: Santi Rosas MD;  Location:  GI     ESOPHAGOSCOPY, GASTROSCOPY, DUODENOSCOPY (EGD), COMBINED N/A 12/28/2018    Procedure: EGD;  Surgeon: Santi Rosas MD;  Location:  OR     ESOPHAGOSCOPY, GASTROSCOPY, DUODENOSCOPY (EGD), COMBINED N/A 12/6/2019    Procedure:  ESOPHAGOGASTRODUODENOSCOPY, WITH BIOPSY;  Surgeon: Adam Morton MD;  Location:  GI     ESOPHAGOSCOPY, GASTROSCOPY, DUODENOSCOPY (EGD), COMBINED N/A 2020    Procedure: ESOPHAGOGASTRODUODENOSCOPY (EGD);  Surgeon: Santi Rosas MD;  Location:  GI     HEAD & NECK SURGERY      2017 at [facility].      IMPLANT GOLD WEIGHT EYELID Right 2017    Procedure: IMPLANT WEIGHT EYELID;  Right Upper Eyelid Weight, right tarsal strip lower eyelid;  Surgeon: Milana Malave MD;  [facility]: [facility]     IR CHEMO EMBOLIZATION  2019     KNEE SURGERY Left      ORTHOPEDIC SURGERY       PAROTIDECTOMY, RADICAL NECK DISSECTION Right 2017    Procedure: PAROTIDECTOMY, RADICAL NECK DISSECTION;  Right Superfacial Parotidectomy , Facial nerve repair. with [facility] facial nerve monitor.;  Surgeon: Asiya Morgan MD;  Location: [facility] OR     PHACOEMULSIFICATION CLEAR CORNEA WITH STANDARD INTRAOCULAR LENS IMPLANT Right 2023    Procedure: PHACOEMULSIFICATION, COMPLEX CATARACT, WITH INTRAOCULAR LENS IMPLANT WITH TRYPAN RIGHT EYE;  Surgeon: Enriqueta Martin MD;  Location: [facility]     PHACOEMULSIFICATION WITH STANDARD INTRAOCULAR LENS IMPLANT Left 1/3/2023    Procedure: PHACOEMULSIFICATION, COMPLEX CATARACT, WITH STANDARD INTRAOCULAR LENS IMPLANT INSERTION LEFT EYE;  Surgeon: Enriqueta Martin MD;  Location: [facility] OR     PICC INSERTION Left 2017    4fr SL BioFlo PICC, 44cm in the L basilic vein w/ tip in the low SVC     RETURN LIVER TRANSPLANT N/A 2019    Procedure: Exploratory laparotomy, hematoma evacuation, abdominal washout;  Surgeon: Александр Ramos MD;  Location: [facility] OR     TRANSPLANT LIVER RECIPIENT  DONOR N/A 2019    Procedure: TRANSPLANT, LIVER, RECIPIENT,  DONOR;  Surgeon: Александр Ramos MD;  Location:  OR     VASCULAR SURGERY       Current Outpatient Medications   Medication     acetaminophen (TYLENOL) 325 MG tablet     ammonium lactate (AMLACTIN) 12 %  "external cream     aspirin (SM ASPIRIN ADULT LOW STRENGTH) 81 MG EC tablet     BD VIKTORIA U/F 32G X 4 MM insulin pen needle     benzoyl peroxide 5 % external liquid     Continuous Blood Gluc Sensor (FREESTYLE CLAUDIA 2 SENSOR) MISC     empagliflozin (JARDIANCE) 10 MG TABS tablet     insulin aspart (NOVOLOG PEN) 100 UNIT/ML pen     insulin degludec (TRESIBA FLEXTOUCH) 100 UNIT/ML pen     ketoconazole (NIZORAL) 2 % external shampoo     lamiVUDine (EPIVIR) 100 MG tablet     lisinopril (ZESTRIL) 10 MG tablet     metFORMIN (GLUCOPHAGE XR) 500 MG 24 hr tablet     metoprolol succinate ER (TOPROL XL) 25 MG 24 hr tablet     Multiple Vitamin (TAB-A-GLADIS) TABS     mycophenolate (GENERIC EQUIVALENT) 250 MG capsule     order for DME     pantoprazole (PROTONIX) 40 MG EC tablet     prednisoLONE acetate (PRED FORTE) 1 % ophthalmic suspension     predniSONE (DELTASONE) 5 MG tablet     rosuvastatin (CRESTOR) 20 MG tablet     tamsulosin (FLOMAX) 0.4 MG capsule     Vitamin D3 50 mcg (2000 units) tablet     ketorolac (ACULAR) 0.5 % ophthalmic solution     Current Facility-Administered Medications   Medication     aflibercept (EYLEA) injection prefilled syringe 2 mg     aflibercept (EYLEA) injection prefilled syringe 2 mg     dexamethasone (OZURDEX) intravitreal implant 0.7 mg     dexamethasone (OZURDEX) intravitreal implant 0.7 mg     lidocaine (PF) (XYLOCAINE) injection 1 mL     Allergies   Allergen Reactions     Codeine Other (See Comments)     Cannot take due to liver  Cannot tolerate oral narcotics     Seasonal Allergies      Sneezing, coughing, runny and itchy eyes             Review of Systems         Objective    /70 (BP Location: Right arm, Patient Position: Sitting, Cuff Size: Adult Regular)   Pulse 83   Temp 98.4  F (36.9  C) (Oral)   Ht 1.753 m (5' 9\")   Wt 95.4 kg (210 lb 4.8 oz)   SpO2 100%   BMI 31.06 kg/m    Body mass index is 31.06 kg/m .  Physical Exam   GENERAL: healthy, alert and no distress  RESP: lungs clear " to auscultation - no rales, rhonchi or wheezes  CV: regular rate and rhythm, normal S1 S2, no S3 or S4, no murmur, click or rub, no peripheral edema and peripheral pulses strong  ABDOMEN: soft, nontender, no hepatosplenomegaly, no masses and bowel sounds normal  MS: no gross musculoskeletal defects noted, no edema  Chest wall nontender; no bony stepoff or crepitus  Does have pain R ant lat chest wall w/ abduction shoulder, shoulder ROM intact

## 2023-05-18 ENCOUNTER — VIRTUAL VISIT (OUTPATIENT)
Dept: BEHAVIORAL HEALTH | Facility: CLINIC | Age: 58
End: 2023-05-18
Payer: MEDICARE

## 2023-05-18 DIAGNOSIS — F31.31 BIPOLAR 1 DISORDER, DEPRESSED, MILD (H): Primary | ICD-10-CM

## 2023-05-18 DIAGNOSIS — F41.1 GENERALIZED ANXIETY DISORDER: ICD-10-CM

## 2023-05-18 PROCEDURE — 90832 PSYTX W PT 30 MINUTES: CPT | Mod: 95 | Performed by: PSYCHOLOGIST

## 2023-05-18 NOTE — PROGRESS NOTES
[facility]: Integrated Behavioral Health  May 18, 2023          Behavioral Health Clinician Progress Note    Patient Name: Frandy Workman           Service Type: Video visit      Service Location:  Video visit      Session Start Time: 3:00  Session End Time:  3:30      Session Length: 16 - 37      Attendees: Patient    Visit Activities (Refresh list every visit): [facility] Only     Service Modality:  Video Visit:      Provider verified identity through the following two step process.  Patient provided:  Patient photo and Patient is known previously to provider    Telemedicine Visit: The patient's condition can be safely assessed and treated via synchronous audio and visual telemedicine encounter.      Reason for Telemedicine Visit: Services only offered telehealth    Originating Site (Patient Location): Patient's home    Distant Site (Provider Location): Provider Remote Setting- Home Office    Consent:  The patient/guardian has verbally consented to: the potential risks and benefits of telemedicine (video visit) versus in person care; bill my insurance or make self-payment for services provided; and responsibility for payment of non-covered services.     Patient would like the video invitation sent by:  My Chart    Mode of Communication:  Video Conference via [facility]    Distant Location (Provider):  Off-site    As the provider I attest to compliance with applicable laws and regulations related to telemedicine.      Diagnostic Assessment Date:  12/03/2020  Treatment Plan Review Date:  7/18/2023  See Flowsheets for today's PHQ-9 and LIZZETTE-7 results  Previous PHQ-9:       3/20/2023     9:10 AM 5/8/2023     5:56 PM 5/17/2023     3:37 PM   PHQ-9 SCORE   PHQ-9 Total Score MyChart  6 (Mild depression)    PHQ-9 Total Score 11 6 6     Previous LIZZETTE-7:       4/7/2021    12:34 PM 9/30/2021     1:45 PM 5/17/2023     3:37 PM   LIZZETTE-7 SCORE   Total Score 9 (mild anxiety)     Total Score 9 10 6  "      Extended Session (60+ minutes): No  Interactive Complexity: No  Crisis: No    Treatment Objective(s) Addressed in This Session:  Target Behavior(s): disease management/lifestyle changes  related to adaptive approaches to managing anxious distress and mood difficulties    Depressed mood: Increase interest, pleasure in doing things.      Current Stressors / Issues:  [facility] met with Miller today for a follow-up, to help Miller continue to feel supported in his health behavior change and overall mental health. He reports mood is stable, had his long-term friend Art come to visit him lately and they did a few fun things together. Miller notes he is continuing to work on his walking each day, which he says is helpful for his mood and energy. He continues to reportedly struggle with his health difficulties and overall low energy throughout the day, though is doing what he can. Briefly discussed focusing on developing healthy, though small achievable habits around lifestyle/health changes, like learning to run plays/shoot the ball in basket ball and how this can help \"score points\" so to speak.     Answers for HPI/ROS submitted by the patient on 7/12/2022  If you checked off any problems, how difficult have these problems made it for you to do your work, take care of things at home, or get along with other people?: Somewhat difficult  PHQ9 TOTAL SCORE: 4    Answers for HPI/ROS submitted by the patient on 9/8/2022  If you checked off any problems, how difficult have these problems made it for you to do your work, take care of things at home, or get along with other people?: Somewhat difficult  PHQ9 TOTAL SCORE: 3    Answers for HPI/ROS submitted by the patient on 11/21/2022  If you checked off any problems, how difficult have these problems made it for you to do your work, take care of things at home, or get along with other people?: Very difficult  PHQ9 TOTAL SCORE: 4      Answers for HPI/ROS submitted by the patient on " 1/18/2023  If you checked off any problems, how difficult have these problems made it for you to do your work, take care of things at home, or get along with other people?: Extremely difficult  PHQ9 TOTAL SCORE: 8        Progress on Treatment Objective(s) / Homework:  Stable - ACTION (Actively working towards change); Intervened by reinforcing change plan / affirming steps taken      Solution-Focused Therapy    Explore patterns in patient's relationships and discuss options for new behaviors.    Explore patterns in patient's actions and choices and discuss options for new behaviors.    Behavioral Activation    Discuss steps patient can take to become more involved in meaningful activity.    Identify barriers to these activities and explore possible solutions.      Answers for HPI/ROS submitted by the patient on 3/21/2022  If you checked off any problems, how difficult have these problems made it for you to do your work, take care of things at home, or get along with other people?: Not difficult at all  PHQ9 TOTAL SCORE: 6      Answers for HPI/ROS submitted by the patient on 2/8/2022  If you checked off any problems, how difficult have these problems made it for you to do your work, take care of things at home, or get along with other people?: Somewhat difficult  PHQ9 TOTAL SCORE: 4      Answers for HPI/ROS submitted by the patient on 4/7/2021   LIZZETTE 7 TOTAL SCORE: 9  If you checked off any problems, how difficult have these problems made it for you to do your work, take care of things at home, or get along with other people?: Very difficult  PHQ9 TOTAL SCORE: 7*    *No SI    Answers for HPI/ROS submitted by the patient on 10/13/2020   If you checked off any problems, how difficult have these problems made it for you to do your work, take care of things at home, or get along with other people?: Somewhat difficult  PHQ9 TOTAL SCORE: 8*  LIZZETTE 7 TOTAL SCORE: 6      *No SI     Answers for HPI/ROS submitted by the patient  on 12/29/2020   If you checked off any problems, how difficult have these problems made it for you to do your work, take care of things at home, or get along with other people?: Somewhat difficult  PHQ9 TOTAL SCORE: 5*  LIZZETTE 7 TOTAL SCORE: 8    *No SI     Answers for HPI/ROS submitted by the patient on 11/21/2022  If you checked off any problems, how difficult have these problems made it for you to do your work, take care of things at home, or get along with other people?: Very difficult  PHQ9 TOTAL SCORE: 4          Care Plan review completed: no    Medication Review:  No changes to current psychiatric medication(s)     Current Outpatient Medications   Medication     acetaminophen (TYLENOL) 325 MG tablet     ammonium lactate (AMLACTIN) 12 % external cream     aspirin (SM ASPIRIN ADULT LOW STRENGTH) 81 MG EC tablet     BD VIKTORIA U/F 32G X 4 MM insulin pen needle     benzoyl peroxide 5 % external liquid     Continuous Blood Gluc Sensor (FREESTYLE CLAUDIA 2 SENSOR) MISC     empagliflozin (JARDIANCE) 10 MG TABS tablet     insulin aspart (NOVOLOG PEN) 100 UNIT/ML pen     insulin degludec (TRESIBA FLEXTOUCH) 100 UNIT/ML pen     ketoconazole (NIZORAL) 2 % external shampoo     ketorolac (ACULAR) 0.5 % ophthalmic solution     lamiVUDine (EPIVIR) 100 MG tablet     lisinopril (ZESTRIL) 10 MG tablet     metFORMIN (GLUCOPHAGE XR) 500 MG 24 hr tablet     metoprolol succinate ER (TOPROL XL) 25 MG 24 hr tablet     Multiple Vitamin (TAB-A-GLADIS) TABS     mycophenolate (GENERIC EQUIVALENT) 250 MG capsule     order for DME     pantoprazole (PROTONIX) 40 MG EC tablet     prednisoLONE acetate (PRED FORTE) 1 % ophthalmic suspension     predniSONE (DELTASONE) 5 MG tablet     rosuvastatin (CRESTOR) 20 MG tablet     tamsulosin (FLOMAX) 0.4 MG capsule     Vitamin D3 50 mcg (2000 units) tablet     Current Facility-Administered Medications   Medication     aflibercept (EYLEA) injection prefilled syringe 2 mg     aflibercept (EYLEA) injection  prefilled syringe 2 mg     dexamethasone (OZURDEX) intravitreal implant 0.7 mg     dexamethasone (OZURDEX) intravitreal implant 0.7 mg     lidocaine (PF) (XYLOCAINE) injection 1 mL         Medication Compliance:  Yes    Changes in Health Issues:   None reported    Chemical Use Review:   Substance Use: Chemical use reviewed, no active concerns identified      Tobacco Use: No current tobacco use.         Assessment: Current Emotional / Mental Status (status of significant symptoms):  Risk status (Self / Other harm or suicidal ideation)  Patient denies a history of suicidal ideation, suicide attempts, self-injurious behavior, homicidal ideation, homicidal behavior and and other safety concerns     Patient denies current fears or concerns for personal safety.  Patient denies current or recent suicidal ideation or behaviors.  Patient denies current or recent homicidal ideation or behaviors.  Patient denies current or recent self injurious behavior or ideation.  Patient denies other safety concerns.     A safety and risk management plan has not been developed at this time, however patient was encouraged to call [place] [phone] should there be a change in any of these risk factors.    [place] Suicide Severity Rating Scale (Short Version)      12/10/2019     5:00 PM 6/11/2020     9:18 PM 7/1/2020    11:00 AM 7/12/2021     2:30 PM 1/10/2022     9:28 PM 1/3/2023     6:31 AM 1/24/2023     6:22 AM   [place] Suicide Severity Rating (Short Version)   Over the past 2 weeks have you felt down, depressed, or hopeless? yes no no no no no no   Over the past 2 weeks have you had thoughts of killing yourself? no no no no no no    Have you ever attempted to kill yourself? no no no no no no    Q1 Wished to be Dead (Past Month) no     no    Q2 Suicidal Thoughts (Past Month) no     no    Q3 Suicidal Thought Method no     no    Q4 Suicidal Intent without Specific Plan no     no    Q5 Suicide Intent with Specific Plan no     no    Q6  Suicide Behavior (Lifetime) no     no    Level of Risk per Screen      low risk          Appearance:   Appropriate   Eye Contact:   Good   Psychomotor Behavior: TD evident   Attitude:   Cooperative  Interested Friendly Pleasant  Orientation:   All  Speech   Rate / Production: Normal/ Responsive   Volume:  Normal   Mood:    Depressed ; improving  Affect:    Appropriate    Thought Content:  Clear   Thought Form:  Goal Directed  Logical   Insight:    Good     Diagnoses:  1. Bipolar 1 disorder, depressed, mild (H)    2. Generalized anxiety disorder          Collateral Reports Completed:  Not Applicable    Plan: (Homework, other):  Continue with this writer for individual psychotherapy in 3 wks. He will continue to work on managing depression and anxiety through achievable behavioral activation ideas. Information about behavioral activation provided  Today; he plans to increase physical activity by walking each day, leading up to light exercise around 5-10 minutes.  No CD issues.     Joseph Murillo Psy.D, LP   Behavioral Health Clinician   [facility]              ______________________________________________________________________                                            Individual Treatment Plan    Patient's Name: Frandy Workman  YOB: 1965    Date of Creation: 3/1/2022  Date Treatment Plan Last Reviewed/Revised: 4/18/2023    DSM5 Diagnoses: 296.51 Bipolar I Disorder Current or Most Recent Episode Depressed, Mild or 300.02 (F41.1) Generalized Anxiety Disorder  Psychosocial / Contextual Factors: Post Solid Organ Tx  PROMIS (reviewed every 90 days): 4    Referral / Collaboration:  Referral to another professional/service is not indicated at this time..    Anticipated number of session for this episode of care: 4-6  Anticipation frequency of session: Every other week  Anticipated Duration of each session: 38-52 minutes  Treatment plan will be reviewed in 90 days or when  "goals have been changed.       MeasurableTreatment Goal(s) related to diagnosis / functional impairment(s)  Goal 1: Patient will experience a reduction in depressive symptoms along with a corresponding increase in positive emotion and life satisfaction.      Objective #A (Patient Action)    Patient will Increase interest, engagement, and pleasure in doing things.  Status: Continued - Date(s): 4/18/2023    Intervention(s)  Therapist will help patient identify pleasant and mastery oriented events that elicit positive, relaxed mood.    Objective #B  Patient will Decrease frequency and intensity of feeling down, depressed, hopeless.  Status: Continued - Date(s): 4/18/2023    Intervention(s)  Therapist will introduce patient to cognitive-behavioral and acceptance and commitment therapy topics aimed to help reduce depression and anxiety    Objective #C  Patient will Identify negative self-talk and behaviors: challenge core beliefs, myths, and actions  Improve concentration, focus, and mindfulness in daily activities .  Status: Continued - Date(s): COMPLETE    Intervention(s)  Therapist will help patient identify and manage negative self-talk and automatic thoughts; introduce patient to cognitive distortions; help patient develop cognitive diffusion techniques      Goal 2: Patient will experience a reduction in anxious symptoms, along with a corresponding increase in relaxed emotional states and life satisfaction.      Objective #A (Patient Action)  Patient will use cognitive-behavioral and thought diffusion strategies identified in therapy to challenge anxious thoughts.    Status: Continued - Date(s): COMPLETE    Intervention(s)  Therapist will utilize CBT and ACT ideas to help patient challenge anxious thoughts and reduce intensity/duration of anxious distress    Objective #B  Patient will use \"worry time\" each day for 15 minutes of scheduled worry and then defer obsessive or anxious thinking until the next structured " worry time.    Status: Continued - Date(s): COMPLETE    Intervention(s)  Therapist will teach patient how to effectively utilize worry time and/or thought logs/journals each day and incorporate more relaxation behaviors into their routine.    Objective #C  Patient will identify the stressors which contribute to feelings of anxiety  Patient will increase engagement in adaptive coping skills and recreational activities, such as exercise and healthy socialization, to manage distress.  Status: Continued - Date(s): COMPLETE    Intervention(s)  Therapist will help patient identify triggers/situational factors that contribute to anxiety and behavioral skills aimed to manage anxious distress.      Goal 3: Patiient will work toward adaptively managing bipolar-related depression and manic episodes      Objective #A (Patient Action)    Status: Continued - Date(s): COMPLETE    Patient will identify 2-3 signs or signals of emerging mood instability.    Intervention(s)  Therapist will provide educational materials on bipolar disorder and help identifying triggers for mood instability.    Objective #B  Patient will identify 2-3 strategies for more effectively managing Bipolar Disorder.    Status: Continued - Date(s): COMPLETE    Intervention(s)  Therapist will teach emotional recognition/identification. Skills aimed to effectively manage bipolar disorder.      Other Possible Therapeutic Intervention(s):    Psycho-education regarding mental health diagnoses and treatment options    Supportive Therapy    Provide affirmations, reflections, and establish working rapport    Emphasize and reflect on strength of therapeutic relationship    Skills training    Explore skills useful to client in current situation.    Skills include assertiveness, communication, conflict management, problem-solving, relaxation, etc.    Solution-Focused Therapy    Explore patterns in patient's relationships and discuss options for new behaviors.    Explore  patterns in patient's actions and choices and discuss options for new behaviors.    Cognitive-behavioral Therapy    Discuss common cognitive distortions, identify them in patient's life.    Explore ways to challenge, replace, and act against these cognitions.    Acceptance and Commitment Therapy    Explore and identify important values in patient's life.    Discuss ways to commit to behavioral activation around these values.    Psychodynamic psychotherapy    Discuss patient's emotional dynamics and issues and how they impact behaviors.    Explore patient's history of relationships and how they impact present behaviors.    Explore how to work with and make changes in these schemas and patterns.    Narrative Therapy    Explore the patient's story of his/her life from his/her perspective.    Explore alternate ways of understanding their experience, identifying exceptions, developing new themes.    Interpersonal Psychotherapy    Explore patterns in relationships that are effective or ineffective at helping patient reach their goals, find satisfying experience.    Discuss new patterns or behaviors to engage in for improved social functioning.    Behavioral Activation    Discuss steps patient can take to become more involved in meaningful activity.    Identify barriers to these activities and explore possible solutions.    Mindfulness-Based Strategies    Discuss skills based on development and application of mindfulness.    Skills drawn from compassion-focused therapy, dialectical behavior therapy, mindfulness-based stress reduction, mindfulness-based cognitive therapy, etc.      Patient has reviewed and agreed to the above plan.      Joseph Murillo Psy.D, LP   Behavioral Health Clinician   -[facility]     4/18/2023  Answers for HPI/ROS submitted by the patient on 2/28/2023  If you checked off any problems, how difficult have these problems made it for you to do your work, take care of  things at home, or get along with other people?: Very difficult  PHQ9 TOTAL SCORE: 6

## 2023-05-22 NOTE — PROGRESS NOTES
Dermatologic Surgery Clinic Note    May 17, 2023    Dermatology Problem List:  # Hx transplant (liver, 11/11/2019), on IMSP (mycophenolate 500mg BID, prednisone 5mg daily)   # Hx of NMSC   - SCCis, left temporal scalp, s/p MMS 2/15/23   # Folliculitis. BPO wash  # Versicolor. Ketoconazole wash    Subjective: The patient is a 58 year old man who presents today for a wound check after recent dermatologic surgery. No concerns for infection today.    Patient reports uncomplicated healing at recent surgical site on L forehead. Patient is satisfied with the cosmetic appearance and denies symptoms along scar.    Patient recently had a FBSE and had 4 lesions biopsied that all returned benign. He has no additional spots of concern today.    No other associated symptoms, modifying factors, or prior treatments, except when noted above. The patient denies any constitutional symptoms, lymphadenopathy, unintentional weight loss or decreased appetite. No other skin concerns today.    Objective:   Gen: This is a well appearing individual in no acute distress. The patient is alert and oriented x 3.  An exam of the forehead was performed today and visualized the following:  - Well-healed slightly depressed incision on L temporal scalp with mild surrounding erythema  - The surgical site noted above is clean, dry, and intact. There is no surrounding erythema, purulence, or significant tenderness to palpation. No clinical evidence of infection noted today.    Assessment and Plan:     # SCCis, left temporal scalp, s/p MMS 2/15/23   - The patient's surgery site(s) is/are healing very well. No evidence of infection on examination today.   - The patient should follow up with dermatologic surgery PRN, as well as continue with regular skin exams in general dermatology clinic.    The patient was discussed with and evaluated by attending physician, Arnoldo Lyon MD.    Ha Max MD  Dermatology, PGY-5  Mohs surgery fellow    Attending  Attestation  I attest that I discussed the case with the Fellow.  I agree with the plan.   I have reviewed the note and edited it as necessary.    Arnoldo Lyon M.D.  Professor  Director of Dermatologic Surgery  Department of Dermatology  [facility]

## 2023-05-23 ASSESSMENT — ENCOUNTER SYMPTOMS
ALTERED TEMPERATURE REGULATION: 0
POLYDIPSIA: 1
HALLUCINATIONS: 0
EYE PAIN: 1
MYALGIAS: 1
WEIGHT LOSS: 0
STIFFNESS: 1
ARTHRALGIAS: 1
NIGHT SWEATS: 0
EYE WATERING: 0
EYE IRRITATION: 1
JOINT SWELLING: 0
MUSCLE WEAKNESS: 1
DECREASED APPETITE: 0
MUSCLE CRAMPS: 0
DOUBLE VISION: 0
WEIGHT GAIN: 1
INCREASED ENERGY: 1
FEVER: 0
NECK PAIN: 1
FATIGUE: 1
BACK PAIN: 1
POLYPHAGIA: 1
CHILLS: 0
EYE REDNESS: 0

## 2023-05-25 ENCOUNTER — OFFICE VISIT (OUTPATIENT)
Dept: ENDOCRINOLOGY | Facility: CLINIC | Age: 58
End: 2023-05-25
Payer: MEDICARE

## 2023-05-25 DIAGNOSIS — E11.49 TYPE II OR UNSPECIFIED TYPE DIABETES MELLITUS WITH NEUROLOGICAL MANIFESTATIONS, NOT STATED AS UNCONTROLLED(250.60) (H): Primary | ICD-10-CM

## 2023-05-25 DIAGNOSIS — L60.2 ONYCHAUXIS: ICD-10-CM

## 2023-05-25 DIAGNOSIS — Z79.4 TYPE 2 DIABETES MELLITUS WITH STAGE 3A CHRONIC KIDNEY DISEASE, WITH LONG-TERM CURRENT USE OF INSULIN (H): ICD-10-CM

## 2023-05-25 DIAGNOSIS — E11.22 TYPE 2 DIABETES MELLITUS WITH STAGE 3A CHRONIC KIDNEY DISEASE, WITH LONG-TERM CURRENT USE OF INSULIN (H): ICD-10-CM

## 2023-05-25 DIAGNOSIS — N18.31 TYPE 2 DIABETES MELLITUS WITH STAGE 3A CHRONIC KIDNEY DISEASE, WITH LONG-TERM CURRENT USE OF INSULIN (H): ICD-10-CM

## 2023-05-25 DIAGNOSIS — E11.69 TYPE 2 DIABETES MELLITUS WITH DIABETIC FOOT DEFORMITY (H): ICD-10-CM

## 2023-05-25 DIAGNOSIS — M21.969 TYPE 2 DIABETES MELLITUS WITH DIABETIC FOOT DEFORMITY (H): ICD-10-CM

## 2023-05-25 PROCEDURE — 11721 DEBRIDE NAIL 6 OR MORE: CPT | Mod: Q8 | Performed by: PODIATRIST

## 2023-05-25 PROCEDURE — 99213 OFFICE O/P EST LOW 20 MIN: CPT | Mod: 25 | Performed by: PODIATRIST

## 2023-05-25 NOTE — LETTER
5/25/2023       RE: Frandy Workman  [address] [facility] [address]     Dear Colleague,    Thank you for referring your patient, Frandy Workman, to the [facility] [place] at [facility]. Please see a copy of my visit note below.    Past Medical History:   Diagnosis Date    Anemia 2013    Arthritis     BPH (benign prostatic hyperplasia)     CAD (coronary artery disease) 04/01/2019    Cholelithiasis     Conductive hearing loss 08/16/2017    Depressive disorder 1986    Suffer effects throughout life    Gastroesophageal reflux disease 12/01/2014    HCC (hepatocellular carcinoma) (H) 01/22/2019    History of diabetic retinopathy 07/2018    HTN (hypertension) 11/20/2019    Hyperlipidemia     Liver cirrhosis secondary to ESTRADA (H)     Liver transplanted (H) 11/11/2019    Portal vein thrombosis 04/11/2019    Type II diabetes mellitus (H)      Patient Active Problem List   Diagnosis    Bipolar affective disorder in remission (H)    Esophageal varices determined by endoscopy (H)    Brow ptosis    Paralytic lagophthalmos of right upper eyelid    Erectile dysfunction due to diseases classified elsewhere    HCC (hepatocellular carcinoma) (H)    Equivocal stress echocardiogram    Type 2 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, unspecified whether long term insulin use (H)    Status post coronary angiogram    CAD (coronary artery disease)    Liver transplant recipient (H)    Status post liver transplantation (H)    Immunosuppressed status (H)    Benign essential hypertension    Malnutrition related to chronic disease (H)    Hypophosphatasia    Chronic kidney disease, stage 3a (H)    Malnutrition (H)    Anxiety    Mild recurrent major depression (H)    Anemia of chronic renal failure, stage 3a (H)    Rekha (H)    History of coronary artery disease    Dyslipidemia    Constipation, unspecified  constipation type    Excessive sweating    Stage 3b chronic kidney disease (H)    Anemia of chronic renal failure, stage 3b (H)    NSTEMI (non-ST elevated myocardial infarction) (H)    Chest pain, unspecified type    Age-related nuclear cataract of left eye    Hypertensive chronic kidney disease with stage 5 chronic kidney disease or end stage renal disease (H)    Vitreous hemorrhage of left eye (H)    Proliferative diabetic retinopathy of both eyes associated with type 2 diabetes mellitus, unspecified proliferative retinopathy type (H)     Past Surgical History:   Procedure Laterality Date    BYPASS GRAFT ARTERY CORONARY N/A 7/14/2021    Procedure: median sternotomy, on cardiopulmonary bypass, CORONARY ARTERY BYPASS GRAFT (CABG) x2 with left greater saphenous vein endoscopic harvest and left internal mammery artery harvest;  Surgeon: Tom Zapata MD;  [facility]    COLONOSCOPY      2015    COLONOSCOPY N/A 12/6/2019    Procedure: COLONOSCOPY, WITH POLYPECTOMY AND BIOPSY;  Surgeon: Adam Morton MD;  [facility]    CV CENTRAL VENOUS CATHETER PLACEMENT N/A 7/12/2021    Procedure: Central Venous Catheter Placement;  Surgeon: Fermin Polanco MD;  Location: [facility] CARDIAC CATH LAB    CV CORONARY ANGIOGRAM N/A 7/12/2021    Procedure: Coronary Angiogram;  Surgeon: Fermin Polanco MD;  Location: [facility] CARDIAC CATH LAB    CV HEART CATHETERIZATION WITH POSSIBLE INTERVENTION N/A 2/26/2019    Procedure: CORS;  Surgeon: Jagdish Hoyt MD;  Location: [facility] CARDIAC CATH LAB    CV INTRA AORTIC BALLOON N/A 7/12/2021    Procedure: Intra Aortic Balloon Pump Insertion;  Surgeon: Fermin Polanco MD;  Location: [facility] CARDIAC CATH LAB    ESOPHAGOSCOPY, GASTROSCOPY, DUODENOSCOPY (EGD), COMBINED N/A 11/17/2016    Procedure: COMBINED ESOPHAGOSCOPY, GASTROSCOPY, DUODENOSCOPY (EGD);  Surgeon: Santi Rosas MD;  Location:  GI    ESOPHAGOSCOPY,  GASTROSCOPY, DUODENOSCOPY (EGD), COMBINED N/A 11/17/2017    Procedure: COMBINED ESOPHAGOSCOPY, GASTROSCOPY, DUODENOSCOPY (EGD);  EGD;  Surgeon: Santi Rosas MD;  Location:  GI    ESOPHAGOSCOPY, GASTROSCOPY, DUODENOSCOPY (EGD), COMBINED N/A 12/28/2018    Procedure: EGD;  Surgeon: Santi Rosas MD;  [facility]    ESOPHAGOSCOPY, GASTROSCOPY, DUODENOSCOPY (EGD), COMBINED N/A 12/6/2019    Procedure: ESOPHAGOGASTRODUODENOSCOPY, WITH BIOPSY;  Surgeon: Adam Morton MD;  Location:  GI    ESOPHAGOSCOPY, GASTROSCOPY, DUODENOSCOPY (EGD), COMBINED N/A 2/13/2020    Procedure: ESOPHAGOGASTRODUODENOSCOPY (EGD);  Surgeon: Santi Rosas MD;  Location:  GI    HEAD & NECK SURGERY      12/2017 at [facility].     IMPLANT GOLD WEIGHT EYELID Right 11/16/2017    Procedure: IMPLANT WEIGHT EYELID;  Right Upper Eyelid Weight, right tarsal strip lower eyelid;  Surgeon: Milana Malave MD;  [facility]    IR CHEMO EMBOLIZATION  1/22/2019    KNEE SURGERY Left     ORTHOPEDIC SURGERY      PAROTIDECTOMY, RADICAL NECK DISSECTION Right 11/2/2017    Procedure: PAROTIDECTOMY, RADICAL NECK DISSECTION;  Right Superfacial Parotidectomy , Facial nerve repair. with [facility] facial nerve monitor.;  Surgeon: Asiya Morgan MD;  [facility]    PHACOEMULSIFICATION CLEAR CORNEA WITH STANDARD INTRAOCULAR LENS IMPLANT Right 1/24/2023    Procedure: PHACOEMULSIFICATION, COMPLEX CATARACT, WITH INTRAOCULAR LENS IMPLANT WITH TRYPAN RIGHT EYE;  Surgeon: Enriqueta Martin MD;  Location[facility]    PHACOEMULSIFICATION WITH STANDARD INTRAOCULAR LENS IMPLANT Left 1/3/2023    Procedure: PHACOEMULSIFICATION, COMPLEX CATARACT, WITH STANDARD INTRAOCULAR LENS IMPLANT INSERTION LEFT EYE;  Surgeon: Enriqueta Martin MD;  Location: [facility] OR    PICC INSERTION Left 11/06/2017    4fr SL BioFlo PICC, 44cm in the L basilic vein w/ tip in the low SVC    RETURN LIVER TRANSPLANT N/A 11/12/2019    Procedure: Exploratory  laparotomy, hematoma evacuation, abdominal washout;  Surgeon: Александр Ramos MD;  [facility]    TRANSPLANT LIVER RECIPIENT  DONOR N/A 2019    Procedure: TRANSPLANT, LIVER, RECIPIENT,  DONOR;  Surgeon: Александр Ramos MD;  [facility]    VASCULAR SURGERY       Social History     Socioeconomic History    Marital status:      Spouse name: Not on file    Number of children: Not on file    Years of education: Not on file    Highest education level: Bachelor's degree (e.g., BA, AB, BS)   Occupational History    Not on file   Tobacco Use    Smoking status: Former     Packs/day: 6.00     Years: 30.00     Pack years: 180.00     Types: Cigars, Cigarettes     Start date: 2016     Quit date: 10/25/2017     Years since quittin.5    Smokeless tobacco: Former     Types: Chew     Quit date: 10/31/2017    Tobacco comments:     1 tin per week   Vaping Use    Vaping status: Not on file   Substance and Sexual Activity    Alcohol use: No     Alcohol/week: 0.0 standard drinks of alcohol     Comment: quit 1996    Drug use: No    Sexual activity: Not Currently     Partners: Female     Birth control/protection: Condom   Other Topics Concern    Parent/sibling w/ CABG, MI or angioplasty before 65F 55M? Yes   Social History Narrative    Prior [facility] [place], [place], [facility]     Social Determinants of Health     Financial Resource Strain: Low Risk  (10/13/2020)    Overall Financial Resource Strain (CARDIA)     Difficulty of Paying Living Expenses: Not very hard   Food Insecurity: No Food Insecurity (10/13/2020)    Hunger Vital Sign     Worried About Running Out of Food in the Last Year: Never true     Ran Out of Food in the Last Year: Never true   Transportation Needs: No Transportation Needs (10/13/2020)    PRAPARE - Transportation     Lack of Transportation (Medical): No     Lack of Transportation (Non-Medical): No   Physical Activity: Insufficiently Active (10/13/2020)    Exercise Vital Sign      Days of Exercise per Week: 1 day     Minutes of Exercise per Session: 60 min   Stress: Stress Concern Present (10/13/2020)    [place] [employer] - Occupational Stress Questionnaire     Feeling of Stress : To some extent   Social Connections: Socially Isolated (10/13/2020)    Social Connection and Isolation Panel [NHANES]     Frequency of Communication with Friends and Family: Once a week     Frequency of Social Gatherings with Friends and Family: Once a week     Attends Mandaen Services: Never     Active Member of Clubs or Organizations: No     Attends Club or Organization Meetings: Never     Marital Status:    Intimate Partner Violence: Not At Risk (3/17/2022)    Humiliation, Afraid, Rape, and Kick questionnaire     Fear of Current or Ex-Partner: No     Emotionally Abused: No     Physically Abused: No     Sexually Abused: No   Housing Stability: Low Risk  (10/13/2020)    Housing Stability Vital Sign     Unable to Pay for Housing in the Last Year: No     Number of Places Lived in the Last Year: 2     Unstable Housing in the Last Year: No     Family History   Problem Relation Age of Onset    Skin Cancer Mother     Cancer Mother         mastectomy    Diabetes Mother     Cerebrovascular Disease Mother     Thyroid Disease Mother     Depression Mother     Colon Cancer Father 60    Pancreatic Cancer Father 60    Prostate Cancer Father     Macular Degeneration Father     Glaucoma Father     Skin Cancer Father     Thyroid Disease Sister     Depression Sister     Asthma Sister     Sleep Apnea Brother     Colorectal Cancer Maternal Grandmother     Cancer Maternal Grandmother     Substance Abuse Maternal Grandmother         Alcohol    Prostate Cancer Maternal Grandfather     Substance Abuse Maternal Grandfather         Alcohol    Colorectal Cancer Paternal Grandmother     Liver Disease No family hx of     Melanoma No family hx of     Anesthesia Reaction No family hx of     Deep Vein Thrombosis  (DVT) No family hx of      Lab Results   Component Value Date    A1C 6.9 12/14/2022    A1C 6.0 01/10/2022    A1C 6.8 07/13/2021    A1C 6.0 12/30/2020    A1C 6.3 06/13/2020    A1C 6.6 08/08/2018    A1C 6.5 06/09/2017    A1C 7.8 10/25/2016     Last Renal Panel:  Sodium   Date Value Ref Range Status   03/30/2023 139 136 - 145 mmol/L Final   06/28/2021 137 133 - 144 mmol/L Final     Potassium   Date Value Ref Range Status   03/30/2023 4.5 3.4 - 5.3 mmol/L Final   07/20/2022 4.7 3.4 - 5.3 mmol/L Final   06/28/2021 4.6 3.4 - 5.3 mmol/L Final     Chloride   Date Value Ref Range Status   03/30/2023 103 98 - 107 mmol/L Final   07/20/2022 105 94 - 109 mmol/L Final   06/28/2021 107 94 - 109 mmol/L Final     Carbon Dioxide   Date Value Ref Range Status   06/28/2021 25 20 - 32 mmol/L Final     Carbon Dioxide (CO2)   Date Value Ref Range Status   03/30/2023 26 22 - 29 mmol/L Final   07/20/2022 26 20 - 32 mmol/L Final     Anion Gap   Date Value Ref Range Status   03/30/2023 10 7 - 15 mmol/L Final   07/20/2022 7 3 - 14 mmol/L Final   06/28/2021 5 3 - 14 mmol/L Final     Glucose   Date Value Ref Range Status   03/30/2023 204 (H) 70 - 99 mg/dL Final   07/20/2022 269 (H) 70 - 99 mg/dL Final   06/28/2021 208 (H) 70 - 99 mg/dL Final     GLUCOSE BY METER POCT   Date Value Ref Range Status   01/24/2023 181 (H) 70 - 99 mg/dL Final     Urea Nitrogen   Date Value Ref Range Status   03/30/2023 29.1 (H) 8.0 - 23.0 mg/dL Final   07/20/2022 32 (H) 7 - 30 mg/dL Final   06/28/2021 33 (H) 7 - 30 mg/dL Final     Creatinine   Date Value Ref Range Status   03/30/2023 1.80 (H) 0.67 - 1.17 mg/dL Final   06/28/2021 1.62 (H) 0.66 - 1.25 mg/dL Final     GFR Estimate   Date Value Ref Range Status   03/30/2023 43 (L) >60 mL/min/1.73m2 Final     Comment:     eGFR calculated using 2021 CKD-EPI equation.   06/28/2021 46 (L) >60 mL/min/[1.73_m2] Final     Comment:     Non  GFR Calc  Starting 12/18/2018, serum creatinine based estimated GFR (eGFR)  will be   calculated using the Chronic Kidney Disease Epidemiology Collaboration   (CKD-EPI) equation.       GFR, ESTIMATED POCT   Date Value Ref Range Status   12/14/2022 40 (L) >60 mL/min/1.73m2 Final     Calcium   Date Value Ref Range Status   03/30/2023 9.2 8.6 - 10.0 mg/dL Final   06/28/2021 9.1 8.5 - 10.1 mg/dL Final     Phosphorus   Date Value Ref Range Status   03/30/2023 3.4 2.5 - 4.5 mg/dL Final   02/26/2021 4.0 2.5 - 4.5 mg/dL Final     Albumin   Date Value Ref Range Status   03/30/2023 4.1 3.5 - 5.2 g/dL Final   07/20/2022 4.3 3.4 - 5.0 g/dL Final   06/28/2021 4.0 3.4 - 5.0 g/dL Final                   SUBJECTIVE FINDINGS:  A 58-year-old male returns to clinic for Diabetic foot cares.  He relates he is doing okay.  Relates he has numbness, tingling and neuropathy in his feet and he feels it is worsenting.  Relates no ulcers or sores since we have seen him last.  Relates he needs his toenails trimmed.  Relates he is using the Amalactin.  No other specific relieving or aggravating factors.  He relates he does have Diabetic shoes that he wears.       OBJECTIVE FINDINGS:  DP and PT 2/4 bilaterally.  He has dorsally contracted digits 2-5 bilaterally.  He has incurvated nails with minimal thickening and dystrophy 1-5 bilaterally to differing degrees.  There is no erythema, no drainage, no odor, no calor bilaterally.  There are no gross tendon voids bilaterally.  He has a laterally deviated hallux bilaterally.       ASSESSMENT/PLAN:  Onychauxis bilaterally.  He is Diabetic with peripheral Neuropathy and foot deformity.  His protective sensation is intact.  Diagnosis and treatment options discussed with him.  All the nails were reduced bilaterally upon consent.  Continue Diabetic shoes.  Continue the Amlactin cream.  Return to clinic and see me in about 3 months.  Previous notes reviewed.               Moderate level of medical decision making.      Again, thank you for allowing me to participate in the care of  your patient.      Sincerely,    Lamin Gonzalez DPM

## 2023-05-25 NOTE — PROGRESS NOTES
Past Medical History:   Diagnosis Date     Anemia 2013     Arthritis      BPH (benign prostatic hyperplasia)      CAD (coronary artery disease) 04/01/2019     Cholelithiasis      Conductive hearing loss 08/16/2017     Depressive disorder 1986    Suffer effects throughout life     Gastroesophageal reflux disease 12/01/2014     HCC (hepatocellular carcinoma) (H) 01/22/2019     History of diabetic retinopathy 07/2018     HTN (hypertension) 11/20/2019     Hyperlipidemia      Liver cirrhosis secondary to ESTRADA (H)      Liver transplanted (H) 11/11/2019     Portal vein thrombosis 04/11/2019     Type II diabetes mellitus (H)      Patient Active Problem List   Diagnosis     Bipolar affective disorder in remission (H)     Esophageal varices determined by endoscopy (H)     Brow ptosis     Paralytic lagophthalmos of right upper eyelid     Erectile dysfunction due to diseases classified elsewhere     HCC (hepatocellular carcinoma) (H)     Equivocal stress echocardiogram     Type 2 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, unspecified whether long term insulin use (H)     Status post coronary angiogram     CAD (coronary artery disease)     Liver transplant recipient (H)     Status post liver transplantation (H)     Immunosuppressed status (H)     Benign essential hypertension     Malnutrition related to chronic disease (H)     Hypophosphatasia     Chronic kidney disease, stage 3a (H)     Malnutrition (H)     Anxiety     Mild recurrent major depression (H)     Anemia of chronic renal failure, stage 3a (H)     Rekha (H)     History of coronary artery disease     Dyslipidemia     Constipation, unspecified constipation type     Excessive sweating     Stage 3b chronic kidney disease (H)     Anemia of chronic renal failure, stage 3b (H)     NSTEMI (non-ST elevated myocardial infarction) (H)     Chest pain, unspecified type     Age-related nuclear cataract of left eye     Hypertensive  chronic kidney disease with stage 5 chronic kidney disease or end stage renal disease (H)     Vitreous hemorrhage of left eye (H)     Proliferative diabetic retinopathy of both eyes associated with type 2 diabetes mellitus, unspecified proliferative retinopathy type (H)     Past Surgical History:   Procedure Laterality Date     BYPASS GRAFT ARTERY CORONARY N/A 7/14/2021    Procedure: median sternotomy, on cardiopulmonary bypass, CORONARY ARTERY BYPASS GRAFT (CABG) x2 with left greater saphenous vein endoscopic harvest and left internal mammery artery harvest;  Surgeon: Tom Zapata MD;  [facility]: [facility]     COLONOSCOPY      2015     COLONOSCOPY N/A 12/6/2019    Procedure: COLONOSCOPY, WITH POLYPECTOMY AND BIOPSY;  Surgeon: Adam Morton MD;  [facility]     CV CENTRAL VENOUS CATHETER PLACEMENT N/A 7/12/2021    Procedure: Central Venous Catheter Placement;  Surgeon: Fermin Polanco MD;  Location:  [facility]     CV CORONARY ANGIOGRAM N/A 7/12/2021    Procedure: Coronary Angiogram;  Surgeon: Fermin Polanco MD;  Location:  [facility]     CV HEART CATHETERIZATION WITH POSSIBLE INTERVENTION N/A 2/26/2019    Procedure: CORS;  Surgeon: Jagdish Hoyt MD;  Location:  [facility]     CV INTRA AORTIC BALLOON N/A 7/12/2021    Procedure: Intra Aortic Balloon Pump Insertion;  Surgeon: Fermin Polanco MD;  Location:  [facility] CATH [facility]     ESOPHAGOSCOPY, GASTROSCOPY, DUODENOSCOPY (EGD), COMBINED N/A 11/17/2016    Procedure: COMBINED ESOPHAGOSCOPY, GASTROSCOPY, DUODENOSCOPY (EGD);  Surgeon: Santi Rosas MD;  Location:  GI     ESOPHAGOSCOPY, GASTROSCOPY, DUODENOSCOPY (EGD), COMBINED N/A 11/17/2017    Procedure: COMBINED ESOPHAGOSCOPY, GASTROSCOPY, DUODENOSCOPY (EGD);  EGD;  Surgeon: Santi Rosas MD;  Location:  GI     ESOPHAGOSCOPY, GASTROSCOPY, DUODENOSCOPY (EGD), COMBINED N/A 12/28/2018     Procedure: EGD;  Surgeon: Santi Rosas MD;  Location:  OR     ESOPHAGOSCOPY, GASTROSCOPY, DUODENOSCOPY (EGD), COMBINED N/A 2019    Procedure: ESOPHAGOGASTRODUODENOSCOPY, WITH BIOPSY;  Surgeon: Adam Morton MD;  Location:  GI     ESOPHAGOSCOPY, GASTROSCOPY, DUODENOSCOPY (EGD), COMBINED N/A 2020    Procedure: ESOPHAGOGASTRODUODENOSCOPY (EGD);  Surgeon: Santi Rosas MD;  Location:  GI     HEAD & NECK SURGERY      2017 at [facility].      IMPLANT GOLD WEIGHT EYELID Right 2017    Procedure: IMPLANT WEIGHT EYELID;  Right Upper Eyelid Weight, right tarsal strip lower eyelid;  Surgeon: Milana Malave MD;  Location:  OR     IR CHEMO EMBOLIZATION  2019     KNEE SURGERY Left      ORTHOPEDIC SURGERY       PAROTIDECTOMY, RADICAL NECK DISSECTION Right 2017    Procedure: PAROTIDECTOMY, RADICAL NECK DISSECTION;  Right Superfacial Parotidectomy , Facial nerve repair. with [facility] facial nerve monitor.;  Surgeon: Asiya Morgan MD;  [facility]     PHACOEMULSIFICATION CLEAR CORNEA WITH STANDARD INTRAOCULAR LENS IMPLANT Right 2023    Procedure: PHACOEMULSIFICATION, COMPLEX CATARACT, WITH INTRAOCULAR LENS IMPLANT WITH TRYPAN RIGHT EYE;  Surgeon: Enriqueta Martin MD;  Location: [facility] [place]     PHACOEMULSIFICATION WITH STANDARD INTRAOCULAR LENS IMPLANT Left 1/3/2023    Procedure: PHACOEMULSIFICATION, COMPLEX CATARACT, WITH STANDARD INTRAOCULAR LENS IMPLANT INSERTION LEFT EYE;  Surgeon: Enriqueta Martin MD;  Location: [facility]     PICC INSERTION Left 2017    4fr SL BioFlo PICC, 44cm in the L basilic vein w/ tip in the low SVC     RETURN LIVER TRANSPLANT N/A 2019    Procedure: Exploratory laparotomy, hematoma evacuation, abdominal washout;  Surgeon: Александр Ramos MD;  [facility]     TRANSPLANT LIVER RECIPIENT  DONOR N/A 2019    Procedure: TRANSPLANT, LIVER, RECIPIENT,  DONOR;  Surgeon: Александр Ramos MD;   Location: [facility] OR     VASCULAR SURGERY       Social History     Socioeconomic History     Marital status:      Spouse name: Not on file     Number of children: Not on file     Years of education: Not on file     Highest education level: Bachelor's degree (e.g., BA, AB, BS)   Occupational History     Not on file   Tobacco Use     Smoking status: Former     Packs/day: 6.00     Years: 30.00     Pack years: 180.00     Types: Cigars, Cigarettes     Start date: 2016     Quit date: 10/25/2017     Years since quittin.5     Smokeless tobacco: Former     Types: Chew     Quit date: 10/31/2017     Tobacco comments:     1 tin per week   Vaping Use     Vaping status: Not on file   Substance and Sexual Activity     Alcohol use: No     Alcohol/week: 0.0 standard drinks of alcohol     Comment: quit 1996     Drug use: No     Sexual activity: Not Currently     Partners: Female     Birth control/protection: Condom   Other Topics Concern     Parent/sibling w/ CABG, MI or angioplasty before 65F 55M? Yes   Social History Narrative    Prior , [facility]     Social Determinants of Health     Financial Resource Strain: Low Risk  (10/13/2020)    Overall Financial Resource Strain (CARDIA)      Difficulty of Paying Living Expenses: Not very hard   Food Insecurity: No Food Insecurity (10/13/2020)    Hunger Vital Sign      Worried About Running Out of Food in the Last Year: Never true      Ran Out of Food in the Last Year: Never true   Transportation Needs: No Transportation Needs (10/13/2020)    PRAPARE - Transportation      Lack of Transportation (Medical): No      Lack of Transportation (Non-Medical): No   Physical Activity: Insufficiently Active (10/13/2020)    Exercise Vital Sign      Days of Exercise per Week: 1 day      Minutes of Exercise per Session: 60 min   Stress: Stress Concern Present (10/13/2020)    [employer] [place] [employer] - Occupational Stress Questionnaire      Feeling of Stress : To  some extent   Social Connections: Socially Isolated (10/13/2020)    Social Connection and Isolation Panel [NHANES]      Frequency of Communication with Friends and Family: Once a week      Frequency of Social Gatherings with Friends and Family: Once a week      Attends Zoroastrianism Services: Never      Active Member of Clubs or Organizations: No      Attends Club or Organization Meetings: Never      Marital Status:    Intimate Partner Violence: Not At Risk (3/17/2022)    Humiliation, Afraid, Rape, and Kick questionnaire      Fear of Current or Ex-Partner: No      Emotionally Abused: No      Physically Abused: No      Sexually Abused: No   Housing Stability: Low Risk  (10/13/2020)    Housing Stability Vital Sign      Unable to Pay for Housing in the Last Year: No      Number of Places Lived in the Last Year: 2      Unstable Housing in the Last Year: No     Family History   Problem Relation Age of Onset     Skin Cancer Mother      Cancer Mother         mastectomy     Diabetes Mother      Cerebrovascular Disease Mother      Thyroid Disease Mother      Depression Mother      Colon Cancer Father 60     Pancreatic Cancer Father 60     Prostate Cancer Father      Macular Degeneration Father      Glaucoma Father      Skin Cancer Father      Thyroid Disease Sister      Depression Sister      Asthma Sister      Sleep Apnea Brother      Colorectal Cancer Maternal Grandmother      Cancer Maternal Grandmother      Substance Abuse Maternal Grandmother         Alcohol     Prostate Cancer Maternal Grandfather      Substance Abuse Maternal Grandfather         Alcohol     Colorectal Cancer Paternal Grandmother      Liver Disease No family hx of      Melanoma No family hx of      Anesthesia Reaction No family hx of      Deep Vein Thrombosis (DVT) No family hx of      Lab Results   Component Value Date    A1C 6.9 12/14/2022    A1C 6.0 01/10/2022    A1C 6.8 07/13/2021    A1C 6.0 12/30/2020    A1C 6.3 06/13/2020    A1C 6.6 08/08/2018     A1C 6.5 06/09/2017    A1C 7.8 10/25/2016     Last Renal Panel:  Sodium   Date Value Ref Range Status   03/30/2023 139 136 - 145 mmol/L Final   06/28/2021 137 133 - 144 mmol/L Final     Potassium   Date Value Ref Range Status   03/30/2023 4.5 3.4 - 5.3 mmol/L Final   07/20/2022 4.7 3.4 - 5.3 mmol/L Final   06/28/2021 4.6 3.4 - 5.3 mmol/L Final     Chloride   Date Value Ref Range Status   03/30/2023 103 98 - 107 mmol/L Final   07/20/2022 105 94 - 109 mmol/L Final   06/28/2021 107 94 - 109 mmol/L Final     Carbon Dioxide   Date Value Ref Range Status   06/28/2021 25 20 - 32 mmol/L Final     Carbon Dioxide (CO2)   Date Value Ref Range Status   03/30/2023 26 22 - 29 mmol/L Final   07/20/2022 26 20 - 32 mmol/L Final     Anion Gap   Date Value Ref Range Status   03/30/2023 10 7 - 15 mmol/L Final   07/20/2022 7 3 - 14 mmol/L Final   06/28/2021 5 3 - 14 mmol/L Final     Glucose   Date Value Ref Range Status   03/30/2023 204 (H) 70 - 99 mg/dL Final   07/20/2022 269 (H) 70 - 99 mg/dL Final   06/28/2021 208 (H) 70 - 99 mg/dL Final     GLUCOSE BY METER POCT   Date Value Ref Range Status   01/24/2023 181 (H) 70 - 99 mg/dL Final     Urea Nitrogen   Date Value Ref Range Status   03/30/2023 29.1 (H) 8.0 - 23.0 mg/dL Final   07/20/2022 32 (H) 7 - 30 mg/dL Final   06/28/2021 33 (H) 7 - 30 mg/dL Final     Creatinine   Date Value Ref Range Status   03/30/2023 1.80 (H) 0.67 - 1.17 mg/dL Final   06/28/2021 1.62 (H) 0.66 - 1.25 mg/dL Final     GFR Estimate   Date Value Ref Range Status   03/30/2023 43 (L) >60 mL/min/1.73m2 Final     Comment:     eGFR calculated using 2021 CKD-EPI equation.   06/28/2021 46 (L) >60 mL/min/[1.73_m2] Final     Comment:     Non  GFR Calc  Starting 12/18/2018, serum creatinine based estimated GFR (eGFR) will be   calculated using the Chronic Kidney Disease Epidemiology Collaboration   (CKD-EPI) equation.       GFR, ESTIMATED POCT   Date Value Ref Range Status   12/14/2022 40 (L) >60  mL/min/1.73m2 Final     Calcium   Date Value Ref Range Status   03/30/2023 9.2 8.6 - 10.0 mg/dL Final   06/28/2021 9.1 8.5 - 10.1 mg/dL Final     Phosphorus   Date Value Ref Range Status   03/30/2023 3.4 2.5 - 4.5 mg/dL Final   02/26/2021 4.0 2.5 - 4.5 mg/dL Final     Albumin   Date Value Ref Range Status   03/30/2023 4.1 3.5 - 5.2 g/dL Final   07/20/2022 4.3 3.4 - 5.0 g/dL Final   06/28/2021 4.0 3.4 - 5.0 g/dL Final       Answers for HPI/ROS submitted by the patient on 5/23/2023  General Symptoms: Yes  Skin Symptoms: No  HENT Symptoms: No  EYE SYMPTOMS: Yes  HEART SYMPTOMS: No  LUNG SYMPTOMS: No  INTESTINAL SYMPTOMS: No  URINARY SYMPTOMS: No  REPRODUCTIVE SYMPTOMS: No  SKELETAL SYMPTOMS: Yes  BLOOD SYMPTOMS: No  NERVOUS SYSTEM SYMPTOMS: No  MENTAL HEALTH SYMPTOMS: No  Fever: No  Loss of appetite: No  Weight loss: No  Weight gain: Yes  Fatigue: Yes  Night sweats: No  Chills: No  Increased stress: Yes  Excessive hunger: Yes  Excessive thirst: Yes  Feeling hot or cold when others believe the temperature is normal: No  Loss of height: No  Post-operative complications: No  Surgical site pain: No  Hallucinations: No  Change in or Loss of Energy: Yes  Hyperactivity: No  Confusion: No  Eye pain: Yes  Vision loss: No  Dry eyes: Yes  Watery eyes: No  Eye bulging: No  Double vision: No  Flashing of lights: Yes  Spots: Yes  Floaters: No  Redness: No  Crossed eyes: No  Tunnel Vision: No  Yellowing of eyes: No  Eye irritation: Yes  Back pain: Yes  Muscle aches: Yes  Neck pain: Yes  Swollen joints: No  Joint pain: Yes  Bone pain: Yes  Muscle cramps: No  Muscle weakness: Yes  Joint stiffness: Yes  Bone fracture: No              SUBJECTIVE FINDINGS:  A 58-year-old male returns to clinic for Diabetic foot cares.  He relates he is doing okay.  Relates he has numbness, tingling and neuropathy in his feet and he feels it is worsenting.  Relates no ulcers or sores since we have seen him last.  Relates he needs his toenails trimmed.   Relates he is using the Amalactin.  No other specific relieving or aggravating factors.  He relates he does have Diabetic shoes that he wears.       OBJECTIVE FINDINGS:  DP and PT 2/4 bilaterally.  He has dorsally contracted digits 2-5 bilaterally.  He has incurvated nails with minimal thickening and dystrophy 1-5 bilaterally to differing degrees.  There is no erythema, no drainage, no odor, no calor bilaterally.  There are no gross tendon voids bilaterally.  He has a laterally deviated hallux bilaterally.       ASSESSMENT/PLAN:  Onychauxis bilaterally.  He is Diabetic with peripheral Neuropathy and foot deformity.  His protective sensation is intact.  Diagnosis and treatment options discussed with him.  All the nails were reduced bilaterally upon consent.  Continue Diabetic shoes.  Continue the Amlactin cream.  Return to clinic and see me in about 3 months.  Previous notes reviewed.                        Moderate level of medical decision making.

## 2023-05-31 ENCOUNTER — OFFICE VISIT (OUTPATIENT)
Dept: OPHTHALMOLOGY | Facility: CLINIC | Age: 58
End: 2023-05-31
Attending: OPHTHALMOLOGY
Payer: MEDICARE

## 2023-05-31 DIAGNOSIS — E11.3593 PROLIFERATIVE DIABETIC RETINOPATHY OF BOTH EYES ASSOCIATED WITH TYPE 2 DIABETES MELLITUS, UNSPECIFIED PROLIFERATIVE RETINOPATHY TYPE (H): ICD-10-CM

## 2023-05-31 DIAGNOSIS — E11.311 DIABETIC MACULAR EDEMA (H): Primary | ICD-10-CM

## 2023-05-31 PROCEDURE — 67028 INJECTION EYE DRUG: CPT | Mod: LT | Performed by: OPHTHALMOLOGY

## 2023-05-31 PROCEDURE — 250N000011 HC RX IP 250 OP 636: Performed by: OPHTHALMOLOGY

## 2023-05-31 PROCEDURE — 92134 CPTRZ OPH DX IMG PST SGM RTA: CPT | Performed by: OPHTHALMOLOGY

## 2023-05-31 PROCEDURE — G0463 HOSPITAL OUTPT CLINIC VISIT: HCPCS | Mod: 25 | Performed by: OPHTHALMOLOGY

## 2023-05-31 RX ADMIN — AFLIBERCEPT 2 MG: 40 INJECTION, SOLUTION INTRAVITREAL at 11:44

## 2023-05-31 ASSESSMENT — REFRACTION_WEARINGRX
SPECS_TYPE: PAL
OD_CYLINDER: +0.75
OS_AXIS: 044
OS_ADD: +2.00
OD_AXIS: 131
OS_CYLINDER: +0.50
OD_ADD: +2.00
OS_SPHERE: -6.75
OD_SPHERE: -7.00

## 2023-05-31 ASSESSMENT — TONOMETRY
IOP_METHOD: TONOPEN
OD_IOP_MMHG: 16
OS_IOP_MMHG: 09

## 2023-05-31 ASSESSMENT — CUP TO DISC RATIO
OS_RATIO: 0.4
OD_RATIO: 0.3

## 2023-05-31 ASSESSMENT — CONF VISUAL FIELD
OS_SUPERIOR_TEMPORAL_RESTRICTION: 0
OS_NORMAL: 1
OD_SUPERIOR_NASAL_RESTRICTION: 0
OD_INFERIOR_TEMPORAL_RESTRICTION: 0
OD_NORMAL: 1
OD_INFERIOR_NASAL_RESTRICTION: 0
OS_INFERIOR_NASAL_RESTRICTION: 0
OS_SUPERIOR_NASAL_RESTRICTION: 0
OD_SUPERIOR_TEMPORAL_RESTRICTION: 0
OS_INFERIOR_TEMPORAL_RESTRICTION: 0

## 2023-05-31 ASSESSMENT — VISUAL ACUITY
OD_CC+: -2
METHOD: SNELLEN - LINEAR
OD_CC: 20/20
OS_CC: 20/20
OS_CC+: -2

## 2023-05-31 ASSESSMENT — SLIT LAMP EXAM - LIDS
COMMENTS: SMALL PAPILLOMA ALONG LL MARGIN, NON CONCERNING
COMMENTS: NORMAL

## 2023-05-31 ASSESSMENT — EXTERNAL EXAM - LEFT EYE: OS_EXAM: PERIOCULAR ECCHYMOSIS

## 2023-05-31 ASSESSMENT — EXTERNAL EXAM - RIGHT EYE: OD_EXAM: NORMAL

## 2023-05-31 NOTE — NURSING NOTE
Chief Complaints and History of Present Illnesses   Patient presents with     Follow Up     3 week follow up   Last A1C 6.9 taken in 12/22  Blood sugar 136 this am   Catherine LEONG 10:27 AM May 31, 2023        Chief Complaint(s) and History of Present Illness(es)     Follow Up            Laterality: both eyes    Associated symptoms: itching.  Negative for eye pain, pain with eye movement, flashes and floaters    Pain scale: 0/10    Comments: 3 week follow up   Last A1C 6.9 taken in 12/22  Blood sugar 136 this am   Catherine Mohan [facility] 10:27 AM May 31, 2023

## 2023-05-31 NOTE — PROGRESS NOTES
CC:  Follow up Diabetic retinopathy     Interval: Here for 5 week follow-up of Type 2 diabetes mellitus with PDR both eyes and macular edema. VA subjectively unchanged, no flashes, no floaters. Last visit he did not receive any injections.     Lab Results   Component                Value               Date                       A1C                      6.9                 12/14/2022       HPI: Frandy Workman is a 58 year old patient status post Cataract extraction intraocular lens left eye   history of Diabetes mellitus for 24 ys . Patient on insulin.    Interval History: s/p CEIOL left eye 1/3/22    Retinal Imaging:  OCT 05/31/23   RE: Good foveal contour, central Diabetic macular edema with a few cysts. Improved   LE: Much improved central diabetic macular edema, resolved intraretinal fluid mild Epiretinal membrane stable --- improved     fluorescein angiography 03/29/23 both eyes normal AV and choroidal filling ou. Staining laser scars. No obvious NVE, mild late macula leakage. peripheral leakage and capillary non perfusion.     Optos consistent with clinical exam      Assessment & Plan:    #T2DM   - HbA1c 6.9% (12/2022)  - Blood pressure (<120/80) and blood glucose (HbA1c <7.0, ~6.5 today) control discussed with patient. Patient advised that failure to adequately control each may lead to vision loss. The patient expressed understanding.    # Proliferative diabetic retinopathy left eye; pre-proliferative diabetic retinopathy right eye    # vitreous hemorrhage left eye 10/12/22   Status post  Eylea inj left eye   Status post  Panretinal laser photocoagulation (PRP) 9.28.22 left eye and Status post scatter PRP right eye 11/02/22     # Diabetic macular edema both eyes   - status post avastin in both eyes; Switch to Eylea 4/24/19 with improvement of Diabetic macular edema     - Tried holding off on injection 3/29/23, but edema worsened both eyes  - 05/03/23 Diabetic macular edema  L>R  05/03/23  - last Eylea both  eyes 5.3.23      # status post Cataract extraction intraocular lens both eyes   Right eye: 01/24/23; left eye 01/3/23  -IOP WNL today  Retina detachment and endophthalmitis precautions were discussed with the patient (increased blurry vision, drainage, new flashes, floaters or a curtain in the visual field) and was asked to return if any of the those occur    # Dry eye syndrome   Left eye worse than right eye   warm compresses and artificial tears  As needed  - punctal plugs previously left eye - reports this is better     # Status post liver transplant  - tx 11/2019  - severe anemia; s/p transfusion - anemia much improved   - being seen by his primary team    PLAN:  - Eylea both eyes today (05/03/23).  - plan to observe closely right eye - Diabetic macular edema resolving   - Follow up in 4-6 weeks with Optical Coherence Tomography, no dilation; possible Eylea inj both eyes     Consider changing Eylea to ozurdex left eye  inj if recurrence of intraretinal fluid     ~~~~~~~~~~~~~~~~~~~~~~~~~~~~~~~~~~   Complete documentation of historical and exam elements from today's encounter can be found in the full encounter summary report (not reduplicated in this progress note).  I personally obtained the chief complaint(s) and history of present illness.  I confirmed and edited as necessary the review of systems, past medical/surgical history, family history, social history, and examination findings as documented by others; and I examined the patient myself.  I personally reviewed the relevant tests, images, and reports as documented above.  I formulated and edited as necessary the assessment and plan and discussed the findings and management plan with the patient and family and No resident or fellow assisted with the procedures performed.  I performed the procedures myself.    Enriqueta Martin MD   of Ophthalmology.  Retina Service   Department of Ophthalmology and Visual Neurosciences   [facility]  [place]  Phone: [phone]   Fax: [phone]

## 2023-06-03 ENCOUNTER — HEALTH MAINTENANCE LETTER (OUTPATIENT)
Age: 58
End: 2023-06-03

## 2023-06-08 ENCOUNTER — VIRTUAL VISIT (OUTPATIENT)
Dept: BEHAVIORAL HEALTH | Facility: CLINIC | Age: 58
End: 2023-06-08
Payer: MEDICARE

## 2023-06-08 DIAGNOSIS — F31.31 BIPOLAR 1 DISORDER, DEPRESSED, MILD (H): Primary | ICD-10-CM

## 2023-06-08 PROCEDURE — 90832 PSYTX W PT 30 MINUTES: CPT | Mod: 95 | Performed by: PSYCHOLOGIST

## 2023-06-08 NOTE — PROGRESS NOTES
[facility]: Integrated Behavioral Health  June 8, 2023        Behavioral Health Clinician Progress Note    Patient Name: Frandy Workman           Service Type: Video visit      Service Location:  Video visit      Session Start Time: 1:01  Session End Time: 1:18      Session Length: 16 - 37      Attendees: Patient    Visit Activities (Refresh list every visit): [facility] [place] Only     Service Modality:  Video Visit:      Provider verified identity through the following two step process.  Patient provided:  Patient photo and Patient is known previously to provider    Telemedicine Visit: The patient's condition can be safely assessed and treated via synchronous audio and visual telemedicine encounter.      Reason for Telemedicine Visit: Services only offered telehealth    Originating Site (Patient Location): Patient's home    Distant Site (Provider Location): Provider Remote Setting- Home Office    Consent:  The patient/guardian has verbally consented to: the potential risks and benefits of telemedicine (video visit) versus in person care; bill my insurance or make self-payment for services provided; and responsibility for payment of non-covered services.     Patient would like the video invitation sent by:  My Chart    Mode of Communication:  Video Conference via [facility]    Distant Location (Provider):  Off-site    As the provider I attest to compliance with applicable laws and regulations related to telemedicine.      Diagnostic Assessment Date:  12/03/2020  Treatment Plan Review Date:  7/18/2023  See Flowsheets for today's PHQ-9 and LIZZETTE-7 results  Previous PHQ-9:       3/20/2023     9:10 AM 5/8/2023     5:56 PM 5/17/2023     3:37 PM   PHQ-9 SCORE   PHQ-9 Total Score MyChart  6 (Mild depression)    PHQ-9 Total Score 11 6 6     Previous LIZZETTE-7:       4/7/2021    12:34 PM 9/30/2021     1:45 PM 5/17/2023     3:37 PM   LIZZETTE-7 SCORE   Total Score 9 (mild anxiety)     Total Score 9 10 6       Extended  Session (60+ minutes): No  Interactive Complexity: No  Crisis: No    Treatment Objective(s) Addressed in This Session:  Target Behavior(s): disease management/lifestyle changes  related to adaptive approaches to managing anxious distress and mood difficulties    Depressed mood: Increase interest, pleasure in doing things.      Current Stressors / Issues:  [facility] met with Miller today for a follow-up, to help Miller continue to feel supported in his health behavior change and overall mental health.  Completed a general check-in today about his overall health. Notes doing really well with increasing his exercise to about 3800 steps per day and he is enjoying the little bit of weight loss he is having.  Miller also notes our check-ins have been helpful for him keeping on top of certain responsibilities, like paying bills and organizing his paperwork/desk. Continued to provide Miller support and affirmed the adaptive steps he has taken toward change and maintaing adaptive changes, like around exercise.     Answers for HPI/ROS submitted by the patient on 7/12/2022  If you checked off any problems, how difficult have these problems made it for you to do your work, take care of things at home, or get along with other people?: Somewhat difficult  PHQ9 TOTAL SCORE: 4    Answers for HPI/ROS submitted by the patient on 9/8/2022  If you checked off any problems, how difficult have these problems made it for you to do your work, take care of things at home, or get along with other people?: Somewhat difficult  PHQ9 TOTAL SCORE: 3    Answers for HPI/ROS submitted by the patient on 11/21/2022  If you checked off any problems, how difficult have these problems made it for you to do your work, take care of things at home, or get along with other people?: Very difficult  PHQ9 TOTAL SCORE: 4      Answers for HPI/ROS submitted by the patient on 1/18/2023  If you checked off any problems, how difficult have these problems made it for you to do your  work, take care of things at home, or get along with other people?: Extremely difficult  PHQ9 TOTAL SCORE: 8        Progress on Treatment Objective(s) / Homework:  Stable - ACTION (Actively working towards change); Intervened by reinforcing change plan / affirming steps taken      Solution-Focused Therapy    Explore patterns in patient's relationships and discuss options for new behaviors.    Explore patterns in patient's actions and choices and discuss options for new behaviors.    Behavioral Activation    Discuss steps patient can take to become more involved in meaningful activity.    Identify barriers to these activities and explore possible solutions.      Answers for HPI/ROS submitted by the patient on 3/21/2022  If you checked off any problems, how difficult have these problems made it for you to do your work, take care of things at home, or get along with other people?: Not difficult at all  PHQ9 TOTAL SCORE: 6      Answers for HPI/ROS submitted by the patient on 2/8/2022  If you checked off any problems, how difficult have these problems made it for you to do your work, take care of things at home, or get along with other people?: Somewhat difficult  PHQ9 TOTAL SCORE: 4      Answers for HPI/ROS submitted by the patient on 4/7/2021   LIZZETTE 7 TOTAL SCORE: 9  If you checked off any problems, how difficult have these problems made it for you to do your work, take care of things at home, or get along with other people?: Very difficult  PHQ9 TOTAL SCORE: 7*    *No SI    Answers for HPI/ROS submitted by the patient on 10/13/2020   If you checked off any problems, how difficult have these problems made it for you to do your work, take care of things at home, or get along with other people?: Somewhat difficult  PHQ9 TOTAL SCORE: 8*  LIZZETTE 7 TOTAL SCORE: 6      *No SI     Answers for HPI/ROS submitted by the patient on 12/29/2020   If you checked off any problems, how difficult have these problems made it for you to do  your work, take care of things at home, or get along with other people?: Somewhat difficult  PHQ9 TOTAL SCORE: 5*  LIZZETTE 7 TOTAL SCORE: 8    *No SI     Answers for HPI/ROS submitted by the patient on 11/21/2022  If you checked off any problems, how difficult have these problems made it for you to do your work, take care of things at home, or get along with other people?: Very difficult  PHQ9 TOTAL SCORE: 4          Care Plan review completed: no    Medication Review:  No changes to current psychiatric medication(s)     Current Outpatient Medications   Medication     acetaminophen (TYLENOL) 325 MG tablet     ammonium lactate (AMLACTIN) 12 % external cream     aspirin (SM ASPIRIN ADULT LOW STRENGTH) 81 MG EC tablet     BD VIKTORIA U/F 32G X 4 MM insulin pen needle     benzoyl peroxide 5 % external liquid     Continuous Blood Gluc Sensor (FREESTYLE CLAUDIA 2 SENSOR) MISC     empagliflozin (JARDIANCE) 10 MG TABS tablet     insulin aspart (NOVOLOG PEN) 100 UNIT/ML pen     insulin degludec (TRESIBA FLEXTOUCH) 100 UNIT/ML pen     ketoconazole (NIZORAL) 2 % external shampoo     ketorolac (ACULAR) 0.5 % ophthalmic solution     lamiVUDine (EPIVIR) 100 MG tablet     lisinopril (ZESTRIL) 10 MG tablet     metFORMIN (GLUCOPHAGE XR) 500 MG 24 hr tablet     metoprolol succinate ER (TOPROL XL) 25 MG 24 hr tablet     Multiple Vitamin (TAB-A-GLADIS) TABS     mycophenolate (GENERIC EQUIVALENT) 250 MG capsule     order for DME     pantoprazole (PROTONIX) 40 MG EC tablet     prednisoLONE acetate (PRED FORTE) 1 % ophthalmic suspension     predniSONE (DELTASONE) 5 MG tablet     rosuvastatin (CRESTOR) 20 MG tablet     tamsulosin (FLOMAX) 0.4 MG capsule     Vitamin D3 50 mcg (2000 units) tablet     Current Facility-Administered Medications   Medication     aflibercept (EYLEA) injection prefilled syringe 2 mg     aflibercept (EYLEA) injection prefilled syringe 2 mg     dexamethasone (OZURDEX) intravitreal implant 0.7 mg     dexamethasone (OZURDEX)  intravitreal implant 0.7 mg     lidocaine (PF) (XYLOCAINE) injection 1 mL         Medication Compliance:  Yes    Changes in Health Issues:   None reported    Chemical Use Review:   Substance Use: Chemical use reviewed, no active concerns identified      Tobacco Use: No current tobacco use.         Assessment: Current Emotional / Mental Status (status of significant symptoms):  Risk status (Self / Other harm or suicidal ideation)  Patient denies a history of suicidal ideation, suicide attempts, self-injurious behavior, homicidal ideation, homicidal behavior and and other safety concerns     Patient denies current fears or concerns for personal safety.  Patient denies current or recent suicidal ideation or behaviors.  Patient denies current or recent homicidal ideation or behaviors.  Patient denies current or recent self injurious behavior or ideation.  Patient denies other safety concerns.     A safety and risk management plan has not been developed at this time, however patient was encouraged to call [place] [phone] should there be a change in any of these risk factors.    [place] Suicide Severity Rating Scale (Short Version)      12/10/2019     5:00 PM 6/11/2020     9:18 PM 7/1/2020    11:00 AM 7/12/2021     2:30 PM 1/10/2022     9:28 PM 1/3/2023     6:31 AM 1/24/2023     6:22 AM   [place] Suicide Severity Rating (Short Version)   Over the past 2 weeks have you felt down, depressed, or hopeless? yes no no no no no no   Over the past 2 weeks have you had thoughts of killing yourself? no no no no no no    Have you ever attempted to kill yourself? no no no no no no    Q1 Wished to be Dead (Past Month) no     no    Q2 Suicidal Thoughts (Past Month) no     no    Q3 Suicidal Thought Method no     no    Q4 Suicidal Intent without Specific Plan no     no    Q5 Suicide Intent with Specific Plan no     no    Q6 Suicide Behavior (Lifetime) no     no    Level of Risk per Screen      low risk           Appearance:   Appropriate   Eye Contact:   Good   Psychomotor Behavior: TD evident   Attitude:   Cooperative  Interested Friendly Pleasant  Orientation:   All  Speech   Rate / Production: Normal/ Responsive   Volume:  Normal   Mood:    Depressed ; improving  Affect:    Appropriate    Thought Content:  Clear   Thought Form:  Goal Directed  Logical   Insight:    Good     Diagnoses:  1. Bipolar 1 disorder, depressed, mild (H)          Collateral Reports Completed:  Not Applicable    Plan: (Homework, other):  Continue with this writer for individual psychotherapy in 3 wks. He will continue to work on managing depression and anxiety through achievable behavioral activation ideas. Information about behavioral activation provided  Today; he plans to increase physical activity by walking each day, leading up to light exercise around 5-10 minutes, currently at 3800 steps, could try to shoot for 4000.  No CD issues.     Joseph Murillo Psy.D, LP   Behavioral Health Clinician   [facility]              ______________________________________________________________________                                            Individual Treatment Plan    Patient's Name: Frandy Workman  YOB: 1965    Date of Creation: 3/1/2022  Date Treatment Plan Last Reviewed/Revised: 4/18/2023    DSM5 Diagnoses: 296.51 Bipolar I Disorder Current or Most Recent Episode Depressed, Mild or 300.02 (F41.1) Generalized Anxiety Disorder  Psychosocial / Contextual Factors: Post Solid Organ Tx  PROMIS (reviewed every 90 days): 4    Referral / Collaboration:  Referral to another professional/service is not indicated at this time..    Anticipated number of session for this episode of care: 4-6  Anticipation frequency of session: Every other week  Anticipated Duration of each session: 38-52 minutes  Treatment plan will be reviewed in 90 days or when goals have been changed.       MeasurableTreatment Goal(s)  "related to diagnosis / functional impairment(s)  Goal 1: Patient will experience a reduction in depressive symptoms along with a corresponding increase in positive emotion and life satisfaction.      Objective #A (Patient Action)    Patient will Increase interest, engagement, and pleasure in doing things.  Status: Continued - Date(s): 4/18/2023    Intervention(s)  Therapist will help patient identify pleasant and mastery oriented events that elicit positive, relaxed mood.    Objective #B  Patient will Decrease frequency and intensity of feeling down, depressed, hopeless.  Status: Continued - Date(s): 4/18/2023    Intervention(s)  Therapist will introduce patient to cognitive-behavioral and acceptance and commitment therapy topics aimed to help reduce depression and anxiety    Objective #C  Patient will Identify negative self-talk and behaviors: challenge core beliefs, myths, and actions  Improve concentration, focus, and mindfulness in daily activities .  Status: Continued - Date(s): COMPLETE    Intervention(s)  Therapist will help patient identify and manage negative self-talk and automatic thoughts; introduce patient to cognitive distortions; help patient develop cognitive diffusion techniques      Goal 2: Patient will experience a reduction in anxious symptoms, along with a corresponding increase in relaxed emotional states and life satisfaction.      Objective #A (Patient Action)  Patient will use cognitive-behavioral and thought diffusion strategies identified in therapy to challenge anxious thoughts.    Status: Continued - Date(s): COMPLETE    Intervention(s)  Therapist will utilize CBT and ACT ideas to help patient challenge anxious thoughts and reduce intensity/duration of anxious distress    Objective #B  Patient will use \"worry time\" each day for 15 minutes of scheduled worry and then defer obsessive or anxious thinking until the next structured worry time.    Status: Continued - Date(s): " COMPLETE    Intervention(s)  Therapist will teach patient how to effectively utilize worry time and/or thought logs/journals each day and incorporate more relaxation behaviors into their routine.    Objective #C  Patient will identify the stressors which contribute to feelings of anxiety  Patient will increase engagement in adaptive coping skills and recreational activities, such as exercise and healthy socialization, to manage distress.  Status: Continued - Date(s): COMPLETE    Intervention(s)  Therapist will help patient identify triggers/situational factors that contribute to anxiety and behavioral skills aimed to manage anxious distress.      Goal 3: Patiient will work toward adaptively managing bipolar-related depression and manic episodes      Objective #A (Patient Action)    Status: Continued - Date(s): COMPLETE    Patient will identify 2-3 signs or signals of emerging mood instability.    Intervention(s)  Therapist will provide educational materials on bipolar disorder and help identifying triggers for mood instability.    Objective #B  Patient will identify 2-3 strategies for more effectively managing Bipolar Disorder.    Status: Continued - Date(s): COMPLETE    Intervention(s)  Therapist will teach emotional recognition/identification. Skills aimed to effectively manage bipolar disorder.      Other Possible Therapeutic Intervention(s):    Psycho-education regarding mental health diagnoses and treatment options    Supportive Therapy    Provide affirmations, reflections, and establish working rapport    Emphasize and reflect on strength of therapeutic relationship    Skills training    Explore skills useful to client in current situation.    Skills include assertiveness, communication, conflict management, problem-solving, relaxation, etc.    Solution-Focused Therapy    Explore patterns in patient's relationships and discuss options for new behaviors.    Explore patterns in patient's actions and choices and  discuss options for new behaviors.    Cognitive-behavioral Therapy    Discuss common cognitive distortions, identify them in patient's life.    Explore ways to challenge, replace, and act against these cognitions.    Acceptance and Commitment Therapy    Explore and identify important values in patient's life.    Discuss ways to commit to behavioral activation around these values.    Psychodynamic psychotherapy    Discuss patient's emotional dynamics and issues and how they impact behaviors.    Explore patient's history of relationships and how they impact present behaviors.    Explore how to work with and make changes in these schemas and patterns.    Narrative Therapy    Explore the patient's story of his/her life from his/her perspective.    Explore alternate ways of understanding their experience, identifying exceptions, developing new themes.    Interpersonal Psychotherapy    Explore patterns in relationships that are effective or ineffective at helping patient reach their goals, find satisfying experience.    Discuss new patterns or behaviors to engage in for improved social functioning.    Behavioral Activation    Discuss steps patient can take to become more involved in meaningful activity.    Identify barriers to these activities and explore possible solutions.    Mindfulness-Based Strategies    Discuss skills based on development and application of mindfulness.    Skills drawn from compassion-focused therapy, dialectical behavior therapy, mindfulness-based stress reduction, mindfulness-based cognitive therapy, etc.      Patient has reviewed and agreed to the above plan.      Joseph Murillo Psy.D, LP   Behavioral Health Clinician   -[facility]     4/18/2023  Answers for HPI/ROS submitted by the patient on 2/28/2023  If you checked off any problems, how difficult have these problems made it for you to do your work, take care of things at home, or get along with other people?:  Very difficult  PHQ9 TOTAL SCORE: 6

## 2023-06-09 NOTE — PLAN OF CARE
7A PT  Discharge Planner PT   Patient plan for discharge: Prefers home but is open to rehab if needed  Current status: Pt very motivated to participate and progress mobility. SBA for supine>sit, cues for abdominal precaution adherence. SBA sit<>stand and toilet transfer. Pt ambulates 600ft with contact guard assist and FWW, anterior trunk lean throughout though no overt LOB. Contact guard assist and B hand rails for stair navigation x8 stairs.  Barriers to return to prior living situation: Medical status, abdominal precautions, weakness, decreased activity tolerance, cognition  Recommendations for discharge: TCU  Rationale for recommendations: Pt below baseline in regards to mobility, would benefit from continued skilled rehab services to progress safety and independence with functional mobility.       Entered by: Na Palencia 11/15/2019 3:04 PM        show

## 2023-06-12 DIAGNOSIS — N40.1 BENIGN PROSTATIC HYPERPLASIA WITH LOWER URINARY TRACT SYMPTOMS, SYMPTOM DETAILS UNSPECIFIED: ICD-10-CM

## 2023-06-14 ENCOUNTER — ANCILLARY PROCEDURE (OUTPATIENT)
Dept: CT IMAGING | Facility: CLINIC | Age: 58
End: 2023-06-14
Attending: INTERNAL MEDICINE
Payer: MEDICARE

## 2023-06-14 ENCOUNTER — LAB (OUTPATIENT)
Dept: LAB | Facility: CLINIC | Age: 58
End: 2023-06-14
Payer: MEDICARE

## 2023-06-14 DIAGNOSIS — E78.5 HYPERLIPIDEMIA LDL GOAL <100: ICD-10-CM

## 2023-06-14 DIAGNOSIS — I25.10 CORONARY ARTERY DISEASE INVOLVING NATIVE CORONARY ARTERY OF NATIVE HEART WITHOUT ANGINA PECTORIS: ICD-10-CM

## 2023-06-14 DIAGNOSIS — Z94.4 STATUS POST LIVER TRANSPLANTATION (H): ICD-10-CM

## 2023-06-14 DIAGNOSIS — E11.9 WELL CONTROLLED TYPE 2 DIABETES MELLITUS (H): ICD-10-CM

## 2023-06-14 DIAGNOSIS — E11.3593 PROLIFERATIVE DIABETIC RETINOPATHY OF BOTH EYES ASSOCIATED WITH TYPE 2 DIABETES MELLITUS, UNSPECIFIED PROLIFERATIVE RETINOPATHY TYPE (H): Primary | ICD-10-CM

## 2023-06-14 DIAGNOSIS — C22.0 HCC (HEPATOCELLULAR CARCINOMA) (H): ICD-10-CM

## 2023-06-14 DIAGNOSIS — Z94.4 LIVER REPLACED BY TRANSPLANT (H): ICD-10-CM

## 2023-06-14 LAB
AFP SERPL-MCNC: 26.7 NG/ML
ALBUMIN SERPL BCG-MCNC: 4.6 G/DL (ref 3.5–5.2)
ALP SERPL-CCNC: 93 U/L (ref 40–129)
ALT SERPL W P-5'-P-CCNC: 28 U/L (ref 0–70)
ANION GAP SERPL CALCULATED.3IONS-SCNC: 11 MMOL/L (ref 7–15)
AST SERPL W P-5'-P-CCNC: 21 U/L (ref 0–45)
BASOPHILS # BLD AUTO: 0.1 10E3/UL (ref 0–0.2)
BASOPHILS NFR BLD AUTO: 1 %
BILIRUB DIRECT SERPL-MCNC: <0.2 MG/DL (ref 0–0.3)
BILIRUB SERPL-MCNC: 0.4 MG/DL
BUN SERPL-MCNC: 34 MG/DL (ref 8–23)
CALCIUM SERPL-MCNC: 9.7 MG/DL (ref 8.6–10)
CHLORIDE SERPL-SCNC: 106 MMOL/L (ref 98–107)
CHOLEST SERPL-MCNC: 194 MG/DL
CREAT BLD-MCNC: 2 MG/DL (ref 0.7–1.3)
CREAT SERPL-MCNC: 1.88 MG/DL (ref 0.67–1.17)
DEPRECATED HCO3 PLAS-SCNC: 23 MMOL/L (ref 22–29)
EOSINOPHIL # BLD AUTO: 0.2 10E3/UL (ref 0–0.7)
EOSINOPHIL NFR BLD AUTO: 2 %
ERYTHROCYTE [DISTWIDTH] IN BLOOD BY AUTOMATED COUNT: 14.1 % (ref 10–15)
GFR SERPL CREATININE-BSD FRML MDRD: 38 ML/MIN/1.73M2
GFR SERPL CREATININE-BSD FRML MDRD: 41 ML/MIN/1.73M2
GLUCOSE SERPL-MCNC: 133 MG/DL (ref 70–99)
HBA1C MFR BLD: 6.3 %
HCT VFR BLD AUTO: 38.1 % (ref 40–53)
HDLC SERPL-MCNC: 37 MG/DL
HGB BLD-MCNC: 12.3 G/DL (ref 13.3–17.7)
IMM GRANULOCYTES # BLD: 0.1 10E3/UL
IMM GRANULOCYTES NFR BLD: 1 %
LDLC SERPL CALC-MCNC: 78 MG/DL
LYMPHOCYTES # BLD AUTO: 0.9 10E3/UL (ref 0.8–5.3)
LYMPHOCYTES NFR BLD AUTO: 12 %
MCH RBC QN AUTO: 31.1 PG (ref 26.5–33)
MCHC RBC AUTO-ENTMCNC: 32.3 G/DL (ref 31.5–36.5)
MCV RBC AUTO: 97 FL (ref 78–100)
MONOCYTES # BLD AUTO: 0.5 10E3/UL (ref 0–1.3)
MONOCYTES NFR BLD AUTO: 7 %
NEUTROPHILS # BLD AUTO: 5.9 10E3/UL (ref 1.6–8.3)
NEUTROPHILS NFR BLD AUTO: 77 %
NONHDLC SERPL-MCNC: 157 MG/DL
NRBC # BLD AUTO: 0 10E3/UL
NRBC BLD AUTO-RTO: 0 /100
PLATELET # BLD AUTO: 178 10E3/UL (ref 150–450)
POTASSIUM SERPL-SCNC: 5 MMOL/L (ref 3.4–5.3)
PROT SERPL-MCNC: 7.2 G/DL (ref 6.4–8.3)
RBC # BLD AUTO: 3.95 10E6/UL (ref 4.4–5.9)
SODIUM SERPL-SCNC: 140 MMOL/L (ref 136–145)
TRIGL SERPL-MCNC: 394 MG/DL
WBC # BLD AUTO: 7.6 10E3/UL (ref 4–11)

## 2023-06-14 PROCEDURE — 83036 HEMOGLOBIN GLYCOSYLATED A1C: CPT | Performed by: PHYSICIAN ASSISTANT

## 2023-06-14 PROCEDURE — 71260 CT THORAX DX C+: CPT | Mod: MG | Performed by: STUDENT IN AN ORGANIZED HEALTH CARE EDUCATION/TRAINING PROGRAM

## 2023-06-14 PROCEDURE — 82565 ASSAY OF CREATININE: CPT | Performed by: INTERNAL MEDICINE

## 2023-06-14 PROCEDURE — G1010 CDSM STANSON: HCPCS | Mod: GC | Performed by: STUDENT IN AN ORGANIZED HEALTH CARE EDUCATION/TRAINING PROGRAM

## 2023-06-14 PROCEDURE — 36415 COLL VENOUS BLD VENIPUNCTURE: CPT | Performed by: PATHOLOGY

## 2023-06-14 PROCEDURE — 85025 COMPLETE CBC W/AUTO DIFF WBC: CPT | Performed by: PATHOLOGY

## 2023-06-14 PROCEDURE — 82105 ALPHA-FETOPROTEIN SERUM: CPT | Performed by: INTERNAL MEDICINE

## 2023-06-14 PROCEDURE — 82248 BILIRUBIN DIRECT: CPT | Performed by: PATHOLOGY

## 2023-06-14 PROCEDURE — 80061 LIPID PANEL: CPT | Performed by: PATHOLOGY

## 2023-06-14 PROCEDURE — 74178 CT ABD&PLV WO CNTR FLWD CNTR: CPT | Mod: MG | Performed by: STUDENT IN AN ORGANIZED HEALTH CARE EDUCATION/TRAINING PROGRAM

## 2023-06-14 PROCEDURE — 82565 ASSAY OF CREATININE: CPT | Performed by: PATHOLOGY

## 2023-06-14 RX ORDER — TAMSULOSIN HYDROCHLORIDE 0.4 MG/1
0.4 CAPSULE ORAL DAILY
Qty: 90 CAPSULE | Refills: 3 | Status: SHIPPED | OUTPATIENT
Start: 2023-06-14 | End: 2024-05-14

## 2023-06-14 RX ORDER — IOPAMIDOL 755 MG/ML
116 INJECTION, SOLUTION INTRAVASCULAR ONCE
Status: COMPLETED | OUTPATIENT
Start: 2023-06-14 | End: 2023-06-14

## 2023-06-14 RX ADMIN — IOPAMIDOL 116 ML: 755 INJECTION, SOLUTION INTRAVASCULAR at 09:50

## 2023-06-15 ENCOUNTER — VIRTUAL VISIT (OUTPATIENT)
Dept: ONCOLOGY | Facility: CLINIC | Age: 58
End: 2023-06-15
Attending: INTERNAL MEDICINE
Payer: MEDICARE

## 2023-06-15 VITALS
WEIGHT: 199 LBS | DIASTOLIC BLOOD PRESSURE: 75 MMHG | HEIGHT: 69 IN | SYSTOLIC BLOOD PRESSURE: 136 MMHG | BODY MASS INDEX: 29.47 KG/M2

## 2023-06-15 DIAGNOSIS — C22.0 HCC (HEPATOCELLULAR CARCINOMA) (H): ICD-10-CM

## 2023-06-15 DIAGNOSIS — M79.89 NODULE OF SOFT TISSUE: Primary | ICD-10-CM

## 2023-06-15 DIAGNOSIS — M79.89 NODULE OF SOFT TISSUE: ICD-10-CM

## 2023-06-15 DIAGNOSIS — Z94.4 STATUS POST LIVER TRANSPLANTATION (H): ICD-10-CM

## 2023-06-15 PROCEDURE — 99215 OFFICE O/P EST HI 40 MIN: CPT | Mod: 95 | Performed by: NURSE PRACTITIONER

## 2023-06-15 PROCEDURE — 99417 PROLNG OP E/M EACH 15 MIN: CPT | Performed by: NURSE PRACTITIONER

## 2023-06-15 ASSESSMENT — PAIN SCALES - GENERAL: PAINLEVEL: NO PAIN (0)

## 2023-06-15 NOTE — LETTER
6/15/2023         RE: Frandy Workman  [address] [address]        Dear Colleague,    Thank you for referring your patient, Frandy Workman, to the [facility]. Please see a copy of my visit note below.    Reason for Visit: seen in follow-up of hepatocellular carcinoma    Oncology HPI:   Miller Workman is a 58 year old man with a PMH of DMII, bipolar disorder, cirrhosis s/t ESTRADA with subsequent development of HCC, s/p liver transplant , HLD, HTN, GERD, BPH and CAD with hx of 2 vessel CABG in July 2021, CKD with anemia of chronic disease, on erythropoetin.      He as diagnosed with hepatocellular carcinoma in December 2018 when he was found to have 2 LIRADS 5 lesions on surveillance imaging. He underwent TACE on 1/22/19.  He is s/p  liver transplant on 11/11/2019. The explanted liver had 2 lesions that were mostly necrotic and less than 3 cm with no clearly identifiable vascular invasion.  He is here for routine surveillance today.    Interval history: Miller has been feeling well. Energy is good. Has increased his walking to 5-6000 steps a day. Is also watching his carbohydrate intake. Has lost about 25 lb over the past few months. No abdominal pain or bloating. Bowels are a little loose since starting metformin. Glucoses are in the 160 range at times. No cough or shortness of breath. No headaches, vision changes. No new bone aches/pains. No fevers/chills/sweats.    Current Outpatient Medications   Medication Sig Dispense Refill    acetaminophen (TYLENOL) 325 MG tablet Take 1 tablet (325 mg) by mouth every 6 hours as needed for mild pain 90 tablet 3    ammonium lactate (AMLACTIN) 12 % external cream Apply topically 2 times daily To feet. 140 g 5    aspirin (SM ASPIRIN ADULT LOW STRENGTH) 81 MG EC tablet Take 2 tablets (162 mg) by mouth daily 180 tablet 3    BD VIKTORIA U/F 32G X 4 MM insulin pen needle Use 5 per day 300 each 3    benzoyl peroxide 5 % external liquid Use  topically in showers as a body wash 226 g 11    Continuous Blood Gluc Sensor (FREESTYLE CLAUDIA 2 SENSOR) [facility] 1 each See Admin Instructions Change every 14 days. 7 each 3    empagliflozin (JARDIANCE) 10 MG TABS tablet Take 1 tablet (10 mg) by mouth daily 90 tablet 3    insulin aspart (NOVOLOG PEN) 100 UNIT/ML pen Inject 5-10 Units Subcutaneous 4 times daily (with meals and nightly) 1unit : 10 g carb before meals.  Also add 1 unit : 50 mg/dl >180 before meals and at bedtime. 45 mL 3    insulin degludec (TRESIBA FLEXTOUCH) 100 UNIT/ML pen Inject 36 units subcutaneous once daily 45 mL 3    ketoconazole (NIZORAL) 2 % external shampoo Apply thin layer topically to scalp in shower (leave on 5 min prior to rinse); may also use as a body wash 120 mL 11    ketorolac (ACULAR) 0.5 % ophthalmic solution Place 1 drop into the right eye 4 times daily 10 mL 0    lamiVUDine (EPIVIR) 100 MG tablet Take 1 tablet (100 mg) by mouth daily 90 tablet 3    lisinopril (ZESTRIL) 10 MG tablet Take 1 tablet (10 mg) by mouth daily 90 tablet 3    metFORMIN (GLUCOPHAGE XR) 500 MG 24 hr tablet Take 2 tablets (1,000 mg) by mouth daily 180 tablet 3    metoprolol succinate ER (TOPROL XL) 25 MG 24 hr tablet Take 0.5 tablets (12.5 mg) by mouth daily 45 tablet 1    Multiple Vitamin (TAB-A-GLADIS) TABS TAKE ONE TABLET BY MOUTH ONCE DAILY 90 tablet 0    mycophenolate (GENERIC EQUIVALENT) 250 MG capsule Take 2 capsules (500 mg) by mouth every 12 hours 120 capsule 11    order for DME 1 Device by Device route daily Knee high compression socks 15-20 mmhg. 1 Device 0    pantoprazole (PROTONIX) 40 MG EC tablet Take 1 tablet (40 mg) by mouth daily before breakfast 90 tablet 3    prednisoLONE acetate (PRED FORTE) 1 % ophthalmic suspension Place 1 drop into the right eye 4 times daily 5 mL 1    predniSONE (DELTASONE) 5 MG tablet Take 1 tablet (5 mg) by mouth daily 90 tablet 3    rosuvastatin (CRESTOR) 20 MG tablet Take 1 tablet (20 mg) by mouth daily 90 tablet 3     "tamsulosin (FLOMAX) 0.4 MG capsule Take 1 capsule (0.4 mg) by mouth daily 90 capsule 3    Vitamin D3 50 mcg (2000 units) tablet Take 1 tablet (50 mcg) by mouth daily 90 tablet 3          Allergies   Allergen Reactions    Codeine Other (See Comments)     Cannot take due to liver  Cannot tolerate oral narcotics    Seasonal Allergies      Sneezing, coughing, runny and itchy eyes          Blood pressure 136/75, height 1.753 m (5' 9\"), weight 90.3 kg (199 lb).  Video physical exam  General: Patient appears well in no acute distress.   Skin: No visualized rash or lesions on visualized skin  Eyes: EOMI, no erythema, sclera icterus or discharge noted  Resp: Appears to be breathing comfortably without accessory muscle usage, speaking in full sentences, no cough  MSK: Appears to have normal range of motion based on visualized movements  Neurologic: No apparent tremors, facial movements symmetric  Psych: affect engaged, pleasant, alert and oriented      Labs:    Latest Reference Range & Units 06/14/23 09:24   Sodium 136 - 145 mmol/L 140   Potassium 3.4 - 5.3 mmol/L 5.0   Chloride 98 - 107 mmol/L 106   Carbon Dioxide (CO2) 22 - 29 mmol/L 23   Urea Nitrogen 8.0 - 23.0 mg/dL 34.0 (H)   Creatinine 0.67 - 1.17 mg/dL 1.88 (H)   GFR Estimate >60 mL/min/1.73m2 41 (L)   Calcium 8.6 - 10.0 mg/dL 9.7   Anion Gap 7 - 15 mmol/L 11   Albumin 3.5 - 5.2 g/dL 4.6   Protein Total 6.4 - 8.3 g/dL 7.2   Alkaline Phosphatase 40 - 129 U/L 93   ALT 0 - 70 U/L 28   AST 0 - 45 U/L 21   AFP tumor marker <=8.3 ng/mL 26.7 (H)   Bilirubin Direct 0.00 - 0.30 mg/dL <0.20   Bilirubin Total <=1.2 mg/dL 0.4   Cholesterol <200 mg/dL 194   Glucose 70 - 99 mg/dL 133 (H)   HDL Cholesterol >=40 mg/dL 37 (L)   Hemoglobin A1C <5.7 % 6.3 (H)   LDL Cholesterol Calculated <=100 mg/dL 78   Non HDL Cholesterol <130 mg/dL 157 (H)   Triglycerides <150 mg/dL 394 (H)   WBC 4.0 - 11.0 10e3/uL 7.6   Hemoglobin 13.3 - 17.7 g/dL 12.3 (L)   Hematocrit 40.0 - 53.0 % 38.1 (L) "   Platelet Count 150 - 450 10e3/uL 178   RBC Count 4.40 - 5.90 10e6/uL 3.95 (L)   MCV 78 - 100 fL 97   MCH 26.5 - 33.0 pg 31.1   MCHC 31.5 - 36.5 g/dL 32.3   RDW 10.0 - 15.0 % 14.1   % Neutrophils % 77   % Lymphocytes % 12   % Monocytes % 7   % Eosinophils % 2   % Basophils % 1   Absolute Basophils 0.0 - 0.2 10e3/uL 0.1   Absolute Eosinophils 0.0 - 0.7 10e3/uL 0.2   Absolute Immature Granulocytes <=0.4 10e3/uL 0.1   Absolute Lymphocytes 0.8 - 5.3 10e3/uL 0.9   Absolute Monocytes 0.0 - 1.3 10e3/uL 0.5   % Immature Granulocytes % 1   Absolute Neutrophils 1.6 - 8.3 10e3/uL 5.9   Absolute NRBCs 10e3/uL 0.0   NRBCs per 100 WBC <1 /100 0   (H): Data is abnormally high  (L): Data is abnormally low    Imaging: I reviewed his imaging and also discussed findings with the radiologist.  The final read is pending.  There are 2 soft tissue nodules in the abdomen concerning for malignancy. A nodule near the cecum (series 11, image 475)  measures 1.5 x 1.6 cm and on retrospective review of CT imaging has been small, but progressing.  There is a new soft tissue nodule in the omentum (series 11, image 348).    Impression/plan:   Hepatocellular carcinoma, s/p prior TACE and liver transplantation in November 2019.  -I reviewed his imaging findings and elevated tumor marker with Dr. Villarreal and also discussed in detail with Miller. Reviewed concern that the soft tissue nodules and elevated AFP raise concern for recurrent HCC. Recommend biopsy to clarify.  Discussed that we would consult with IR regarding imaging guided biopsy. If that is not feasible, would talk with transplant surgery regarding possible laparoscopic biopsy.   We discussed, briefly,  that we do have treatment options that would be aimed at controlling the cancer but that we would discuss that in more detail after reviewing the biopsy results. We discussed surgical resection and how that would not be anticipated to be curative in the setting of recurrent disease.  He appears  to have a good understanding and is eager to proceed with further evaluation. I will communicate with his transplant team and set up follow-up with Dr. Villarreal post-biopsy to discuss results and options for treatment.    70 minutes spent on the date of the encounter doing chart review, review of test results, interpretation of tests, patient visit, discussion with other provider(s) and coordination of care         Again, thank you for allowing me to participate in the care of your patient.        Sincerely,        SHANIQUE Liz CNP

## 2023-06-15 NOTE — PROGRESS NOTES
Reason for Visit: seen in follow-up of hepatocellular carcinoma    Oncology HPI:   Miller Workman is a 58 year old man with a PMH of DMII, bipolar disorder, cirrhosis s/t ESTRADA with subsequent development of HCC, s/p liver transplant , HLD, HTN, GERD, BPH and CAD with hx of 2 vessel CABG in July 2021, CKD with anemia of chronic disease, on erythropoetin.      He as diagnosed with hepatocellular carcinoma in December 2018 when he was found to have 2 LIRADS 5 lesions on surveillance imaging. He underwent TACE on 1/22/19.  He is s/p  liver transplant on 11/11/2019. The explanted liver had 2 lesions that were mostly necrotic and less than 3 cm with no clearly identifiable vascular invasion.  He is here for routine surveillance today.    Interval history: Miller has been feeling well. Energy is good. Has increased his walking to 5-6000 steps a day. Is also watching his carbohydrate intake. Has lost about 25 lb over the past few months. No abdominal pain or bloating. Bowels are a little loose since starting metformin. Glucoses are in the 160 range at times. No cough or shortness of breath. No headaches, vision changes. No new bone aches/pains. No fevers/chills/sweats.    Current Outpatient Medications   Medication Sig Dispense Refill     acetaminophen (TYLENOL) 325 MG tablet Take 1 tablet (325 mg) by mouth every 6 hours as needed for mild pain 90 tablet 3     ammonium lactate (AMLACTIN) 12 % external cream Apply topically 2 times daily To feet. 140 g 5     aspirin (SM ASPIRIN ADULT LOW STRENGTH) 81 MG EC tablet Take 2 tablets (162 mg) by mouth daily 180 tablet 3     BD VIKTORIA U/F 32G X 4 MM insulin pen needle Use 5 per day 300 each 3     benzoyl peroxide 5 % external liquid Use topically in showers as a body wash 226 g 11     Continuous Blood Gluc Sensor (FREESTYLE CLAUDIA 2 SENSOR) MISC 1 each See Admin Instructions Change every 14 days. 7 each 3     empagliflozin (JARDIANCE) 10 MG TABS tablet Take 1 tablet (10 mg) by mouth daily  90 tablet 3     insulin aspart (NOVOLOG PEN) 100 UNIT/ML pen Inject 5-10 Units Subcutaneous 4 times daily (with meals and nightly) 1unit : 10 g carb before meals.  Also add 1 unit : 50 mg/dl >180 before meals and at bedtime. 45 mL 3     insulin degludec (TRESIBA FLEXTOUCH) 100 UNIT/ML pen Inject 36 units subcutaneous once daily 45 mL 3     ketoconazole (NIZORAL) 2 % external shampoo Apply thin layer topically to scalp in shower (leave on 5 min prior to rinse); may also use as a body wash 120 mL 11     ketorolac (ACULAR) 0.5 % ophthalmic solution Place 1 drop into the right eye 4 times daily 10 mL 0     lamiVUDine (EPIVIR) 100 MG tablet Take 1 tablet (100 mg) by mouth daily 90 tablet 3     lisinopril (ZESTRIL) 10 MG tablet Take 1 tablet (10 mg) by mouth daily 90 tablet 3     metFORMIN (GLUCOPHAGE XR) 500 MG 24 hr tablet Take 2 tablets (1,000 mg) by mouth daily 180 tablet 3     metoprolol succinate ER (TOPROL XL) 25 MG 24 hr tablet Take 0.5 tablets (12.5 mg) by mouth daily 45 tablet 1     Multiple Vitamin (TAB-A-GLADIS) TABS TAKE ONE TABLET BY MOUTH ONCE DAILY 90 tablet 0     mycophenolate (GENERIC EQUIVALENT) 250 MG capsule Take 2 capsules (500 mg) by mouth every 12 hours 120 capsule 11     order for DME 1 Device by Device route daily Knee high compression socks 15-20 mmhg. 1 Device 0     pantoprazole (PROTONIX) 40 MG EC tablet Take 1 tablet (40 mg) by mouth daily before breakfast 90 tablet 3     prednisoLONE acetate (PRED FORTE) 1 % ophthalmic suspension Place 1 drop into the right eye 4 times daily 5 mL 1     predniSONE (DELTASONE) 5 MG tablet Take 1 tablet (5 mg) by mouth daily 90 tablet 3     rosuvastatin (CRESTOR) 20 MG tablet Take 1 tablet (20 mg) by mouth daily 90 tablet 3     tamsulosin (FLOMAX) 0.4 MG capsule Take 1 capsule (0.4 mg) by mouth daily 90 capsule 3     Vitamin D3 50 mcg (2000 units) tablet Take 1 tablet (50 mcg) by mouth daily 90 tablet 3          Allergies   Allergen Reactions     Codeine Other  "(See Comments)     Cannot take due to liver  Cannot tolerate oral narcotics     Seasonal Allergies      Sneezing, coughing, runny and itchy eyes          Blood pressure 136/75, height 1.753 m (5' 9\"), weight 90.3 kg (199 lb).  Video physical exam  General: Patient appears well in no acute distress.   Skin: No visualized rash or lesions on visualized skin  Eyes: EOMI, no erythema, sclera icterus or discharge noted  Resp: Appears to be breathing comfortably without accessory muscle usage, speaking in full sentences, no cough  MSK: Appears to have normal range of motion based on visualized movements  Neurologic: No apparent tremors, facial movements symmetric  Psych: affect engaged, pleasant, alert and oriented      Labs:    Latest Reference Range & Units 06/14/23 09:24   Sodium 136 - 145 mmol/L 140   Potassium 3.4 - 5.3 mmol/L 5.0   Chloride 98 - 107 mmol/L 106   Carbon Dioxide (CO2) 22 - 29 mmol/L 23   Urea Nitrogen 8.0 - 23.0 mg/dL 34.0 (H)   Creatinine 0.67 - 1.17 mg/dL 1.88 (H)   GFR Estimate >60 mL/min/1.73m2 41 (L)   Calcium 8.6 - 10.0 mg/dL 9.7   Anion Gap 7 - 15 mmol/L 11   Albumin 3.5 - 5.2 g/dL 4.6   Protein Total 6.4 - 8.3 g/dL 7.2   Alkaline Phosphatase 40 - 129 U/L 93   ALT 0 - 70 U/L 28   AST 0 - 45 U/L 21   AFP tumor marker <=8.3 ng/mL 26.7 (H)   Bilirubin Direct 0.00 - 0.30 mg/dL <0.20   Bilirubin Total <=1.2 mg/dL 0.4   Cholesterol <200 mg/dL 194   Glucose 70 - 99 mg/dL 133 (H)   HDL Cholesterol >=40 mg/dL 37 (L)   Hemoglobin A1C <5.7 % 6.3 (H)   LDL Cholesterol Calculated <=100 mg/dL 78   Non HDL Cholesterol <130 mg/dL 157 (H)   Triglycerides <150 mg/dL 394 (H)   WBC 4.0 - 11.0 10e3/uL 7.6   Hemoglobin 13.3 - 17.7 g/dL 12.3 (L)   Hematocrit 40.0 - 53.0 % 38.1 (L)   Platelet Count 150 - 450 10e3/uL 178   RBC Count 4.40 - 5.90 10e6/uL 3.95 (L)   MCV 78 - 100 fL 97   MCH 26.5 - 33.0 pg 31.1   MCHC 31.5 - 36.5 g/dL 32.3   RDW 10.0 - 15.0 % 14.1   % Neutrophils % 77   % Lymphocytes % 12   % Monocytes % 7 "   % Eosinophils % 2   % Basophils % 1   Absolute Basophils 0.0 - 0.2 10e3/uL 0.1   Absolute Eosinophils 0.0 - 0.7 10e3/uL 0.2   Absolute Immature Granulocytes <=0.4 10e3/uL 0.1   Absolute Lymphocytes 0.8 - 5.3 10e3/uL 0.9   Absolute Monocytes 0.0 - 1.3 10e3/uL 0.5   % Immature Granulocytes % 1   Absolute Neutrophils 1.6 - 8.3 10e3/uL 5.9   Absolute NRBCs 10e3/uL 0.0   NRBCs per 100 WBC <1 /100 0   (H): Data is abnormally high  (L): Data is abnormally low    Imaging: I reviewed his imaging and also discussed findings with the radiologist.  The final read is pending.  There are 2 soft tissue nodules in the abdomen concerning for malignancy. A nodule near the cecum (series 11, image 475)  measures 1.5 x 1.6 cm and on retrospective review of CT imaging has been small, but progressing.  There is a new soft tissue nodule in the omentum (series 11, image 348).    Impression/plan:   Hepatocellular carcinoma, s/p prior TACE and liver transplantation in November 2019.  -I reviewed his imaging findings and elevated tumor marker with Dr. Villarreal and also discussed in detail with Miller. Reviewed concern that the soft tissue nodules and elevated AFP raise concern for recurrent HCC. Recommend biopsy to clarify.  Discussed that we would consult with IR regarding imaging guided biopsy. If that is not feasible, would talk with transplant surgery regarding possible laparoscopic biopsy.   We discussed, briefly,  that we do have treatment options that would be aimed at controlling the cancer but that we would discuss that in more detail after reviewing the biopsy results. We discussed surgical resection and how that would not be anticipated to be curative in the setting of recurrent disease.  He appears to have a good understanding and is eager to proceed with further evaluation. I will communicate with his transplant team and set up follow-up with Dr. Villarreal post-biopsy to discuss results and options for treatment.    70 minutes spent on  the date of the encounter doing chart review, review of test results, interpretation of tests, patient visit, discussion with other provider(s) and coordination of care

## 2023-06-15 NOTE — PROGRESS NOTES
Virtual Visit Details    Type of service:  Video Visit   Video Start Time: 1100  Video End Time:1127    Originating Location (pt. Location): Home    Distant Location (provider location):  Off-site  Platform used for Video Visit: Wesabe

## 2023-06-15 NOTE — CONSULTS
Outpatient IR Biopsy Referral    Patient is a 57 y/o male with a PMH of esophageal varices, HCC, malnutrition, DM 2, HDL, CAD, NSTEMI, CKD, bipolar, s/p liver transplant, concerns for recurrence. IR has been asked to biopsy soft tissue in the omentum, peritoneum.          CT 6/14/23 IMPRESSION:    1. Postsurgical changes of liver transplant. No definite suspicious  arterial enhancing intrahepatic observation.  2. Enhancing soft tissue nodule along the cecum, additional soft  tissue density nodularity in the omentum, concerning for neoplastic  possible metastatic peritoneal implants/metastatic disease.  3. Additional findings similar to prior CT from 12/14/2022 including  findings of splenomegaly, portosystemic collaterals, compression  deformity of T11 vertebral body superior endplate.  4. Similar sub-6 mm pulmonary nodule. No new or enlarging pulmonary  nodule. Consider attention on follow-up.  5. Similar subcentimeter hypoattenuating observation in the pancreatic  head, possible IPMN. Consider attention on follow-up    Gastrointestinal: The stomach and duodenum are unremarkable. Small and  large bowel are normal in caliber and without abnormal wall  thickening. Small focal nodularity at the hepatic flexure anteriorly  (series 11 image 348). Normal appendix. Normal caliber appendix  without inflammatory change with multiple appendicoliths. Lateral to  and abutting or contiguous with the proximal cecum, there is an  enlarging mildly heterogeneous rounded soft tissue mass measuring 1.6  x 1.5 c. This lesion is only visualized on portal venous phase images,  not included within the on arterial phase images.  Mesentery/Peritoneum: No ascites or pneumoperitoneum.    Case and imaging CT 6/14/23 was reviewed with Dr. Wilcox  from IR biopsy is denied as there is not a safe window for an  omentum peritoneum percutaneous biopsy, the colon and structures are surrounding the nodules.     Primary team Brenda BAY  made aware of IR recommendations via epic messaging.    Gina Wilkins DNP, APRN  Interventional Radiology   IR on-call pager: [phone]

## 2023-06-21 ENCOUNTER — PATIENT OUTREACH (OUTPATIENT)
Dept: SURGERY | Facility: CLINIC | Age: 58
End: 2023-06-21
Payer: MEDICARE

## 2023-06-21 ENCOUNTER — DOCUMENTATION ONLY (OUTPATIENT)
Dept: GASTROENTEROLOGY | Facility: CLINIC | Age: 58
End: 2023-06-21
Payer: MEDICARE

## 2023-06-21 DIAGNOSIS — R93.5 ABNORMAL CT OF THE ABDOMEN: Primary | ICD-10-CM

## 2023-06-21 DIAGNOSIS — C22.0 HCC (HEPATOCELLULAR CARCINOMA) (H): ICD-10-CM

## 2023-06-21 DIAGNOSIS — Z94.4 STATUS POST LIVER TRANSPLANTATION (H): ICD-10-CM

## 2023-06-21 NOTE — PROGRESS NOTES
CT C/A/P 6/14/2023 reviewed at liver tumor conference 6/16/2023.    Increase in soft tissue mass near cecum since Dec 2022.  High concern for malignancy (recurrent HCC).  Omental lesion also noted.    Increase in AFP from 4.9 to 26.7 noted.    Lesions not amenable to percutaneous biopsy with IR.  Recommend diagnostic laparoscopy.    Discussed with LT surgery.    Santi Banuelos MD  hepatology

## 2023-06-21 NOTE — PROGRESS NOTES
New Patient Oncology Nurse Navigator Note     Referring provider: Dr. Banuelos     Referring Clinic/Organization: [facility]     Referred to: Surgical Oncology -  Hepatobiliary / GI Cancers     Requested provider (if applicable): Dr. Ruiz Chu    Referral Received: 06/21/23       Evaluation for : Laparoscopic biopsy, history of HCC      Clinical History (per Nurse review of records provided):      See book marked documents:       Referring MD office note - reviewed at liver conference and surgical biopsy recommended. Transplant ok with surg on proceeding with biopsy.     Pathology report    Imaging reports     Procedure report       Allergies   Allergen Reactions     Codeine Other (See Comments)     Cannot take due to liver  Cannot tolerate oral narcotics     Seasonal Allergies      Sneezing, coughing, runny and itchy eyes        Past Medical History:   Diagnosis Date     Anemia 2013     Arthritis      BPH (benign prostatic hyperplasia)      CAD (coronary artery disease) 04/01/2019     Cholelithiasis      Conductive hearing loss 08/16/2017     Depressive disorder 1986    Suffer effects throughout life     Gastroesophageal reflux disease 12/01/2014     HCC (hepatocellular carcinoma) (H) 01/22/2019     History of diabetic retinopathy 07/2018     HTN (hypertension) 11/20/2019     Hyperlipidemia      Liver cirrhosis secondary to ESTRADA (H)      Liver transplanted (H) 11/11/2019     Portal vein thrombosis 04/11/2019     Type II diabetes mellitus (H)        Past Surgical History:   Procedure Laterality Date     BYPASS GRAFT ARTERY CORONARY N/A 7/14/2021    Procedure: median sternotomy, on cardiopulmonary bypass, CORONARY ARTERY BYPASS GRAFT (CABG) x2 with left greater saphenous vein endoscopic harvest and left internal mammery artery harvest;  Surgeon: Tom Zapata MD;  Location: [facility] OR     COLONOSCOPY      2015     COLONOSCOPY N/A 12/6/2019    Procedure: COLONOSCOPY, WITH POLYPECTOMY AND BIOPSY;  Surgeon:  Adam Morton MD;  Location:  GI     CV CENTRAL VENOUS CATHETER PLACEMENT N/A 7/12/2021    Procedure: Central Venous Catheter Placement;  Surgeon: Fermin Polanco MD;  Location:  HEART CARDIAC CATH LAB     CV CORONARY ANGIOGRAM N/A 7/12/2021    Procedure: Coronary Angiogram;  Surgeon: Fermin Polanco MD;  Location:  HEART CARDIAC CATH LAB     CV HEART CATHETERIZATION WITH POSSIBLE INTERVENTION N/A 2/26/2019    Procedure: CORS;  Surgeon: Jagdish Hoyt MD;  Location:  HEART CARDIAC CATH LAB     CV INTRA AORTIC BALLOON N/A 7/12/2021    Procedure: Intra Aortic Balloon Pump Insertion;  Surgeon: Fermin Polanco MD;  Location:  HEART CARDIAC CATH LAB     ESOPHAGOSCOPY, GASTROSCOPY, DUODENOSCOPY (EGD), COMBINED N/A 11/17/2016    Procedure: COMBINED ESOPHAGOSCOPY, GASTROSCOPY, DUODENOSCOPY (EGD);  Surgeon: Santi Rosas MD;  Location:  GI     ESOPHAGOSCOPY, GASTROSCOPY, DUODENOSCOPY (EGD), COMBINED N/A 11/17/2017    Procedure: COMBINED ESOPHAGOSCOPY, GASTROSCOPY, DUODENOSCOPY (EGD);  EGD;  Surgeon: Santi Rosas MD;  Location:  GI     ESOPHAGOSCOPY, GASTROSCOPY, DUODENOSCOPY (EGD), COMBINED N/A 12/28/2018    Procedure: EGD;  Surgeon: Santi Rosas MD;  Location:  OR     ESOPHAGOSCOPY, GASTROSCOPY, DUODENOSCOPY (EGD), COMBINED N/A 12/6/2019    Procedure: ESOPHAGOGASTRODUODENOSCOPY, WITH BIOPSY;  Surgeon: Adam Morton MD;  Location:  GI     ESOPHAGOSCOPY, GASTROSCOPY, DUODENOSCOPY (EGD), COMBINED N/A 2/13/2020    Procedure: ESOPHAGOGASTRODUODENOSCOPY (EGD);  Surgeon: Santi Rosas MD;  Location:  GI     HEAD & NECK SURGERY      12/2017 at [facility].      IMPLANT GOLD WEIGHT EYELID Right 11/16/2017    Procedure: IMPLANT WEIGHT EYELID;  Right Upper Eyelid Weight, right tarsal strip lower eyelid;  Surgeon: Milana Malave MD;  [facility]: [facility] OR     IR CHEMO EMBOLIZATION  1/22/2019     KNEE SURGERY Left       ORTHOPEDIC SURGERY       PAROTIDECTOMY, RADICAL NECK DISSECTION Right 2017    Procedure: PAROTIDECTOMY, RADICAL NECK DISSECTION;  Right Superfacial Parotidectomy , Facial nerve repair. with [facility] facial nerve monitor.;  Surgeon: Asiya Morgan MD;  Location: [facility] OR     PHACOEMULSIFICATION CLEAR CORNEA WITH STANDARD INTRAOCULAR LENS IMPLANT Right 2023    Procedure: PHACOEMULSIFICATION, COMPLEX CATARACT, WITH INTRAOCULAR LENS IMPLANT WITH TRYPAN RIGHT EYE;  Surgeon: Enriqueta Martin MD;  Location: [facility] OR     PHACOEMULSIFICATION WITH STANDARD INTRAOCULAR LENS IMPLANT Left 1/3/2023    Procedure: PHACOEMULSIFICATION, COMPLEX CATARACT, WITH STANDARD INTRAOCULAR LENS IMPLANT INSERTION LEFT EYE;  Surgeon: Enriqueta Martin MD;  Location: [facility] OR     PICC INSERTION Left 2017    4fr SL BioFlo PICC, 44cm in the L basilic vein w/ tip in the low SVC     RETURN LIVER TRANSPLANT N/A 2019    Procedure: Exploratory laparotomy, hematoma evacuation, abdominal washout;  Surgeon: Александр Ramos MD;  Location: [facility] OR     TRANSPLANT LIVER RECIPIENT  DONOR N/A 2019    Procedure: TRANSPLANT, LIVER, RECIPIENT,  DONOR;  Surgeon: Александр Ramos MD;  Location: [facility] OR     VASCULAR SURGERY         Current Outpatient Medications   Medication Sig Dispense Refill     acetaminophen (TYLENOL) 325 MG tablet Take 1 tablet (325 mg) by mouth every 6 hours as needed for mild pain 90 tablet 3     ammonium lactate (AMLACTIN) 12 % external cream Apply topically 2 times daily To feet. 140 g 5     aspirin (SM ASPIRIN ADULT LOW STRENGTH) 81 MG EC tablet Take 2 tablets (162 mg) by mouth daily 180 tablet 3     BD VIKTORIA U/F 32G X 4 MM insulin pen needle Use 5 per day 300 each 3     benzoyl peroxide 5 % external liquid Use topically in showers as a body wash 226 g 11     Continuous Blood Gluc Sensor (FREESTYLE CLAUDIA 2 SENSOR) MISC 1 each See Admin Instructions Change every 14 days. 7 each 3      empagliflozin (JARDIANCE) 10 MG TABS tablet Take 1 tablet (10 mg) by mouth daily 90 tablet 3     insulin aspart (NOVOLOG PEN) 100 UNIT/ML pen Inject 5-10 Units Subcutaneous 4 times daily (with meals and nightly) 1unit : 10 g carb before meals.  Also add 1 unit : 50 mg/dl >180 before meals and at bedtime. 45 mL 3     insulin degludec (TRESIBA FLEXTOUCH) 100 UNIT/ML pen Inject 36 units subcutaneous once daily 45 mL 3     ketoconazole (NIZORAL) 2 % external shampoo Apply thin layer topically to scalp in shower (leave on 5 min prior to rinse); may also use as a body wash 120 mL 11     ketorolac (ACULAR) 0.5 % ophthalmic solution Place 1 drop into the right eye 4 times daily 10 mL 0     lamiVUDine (EPIVIR) 100 MG tablet Take 1 tablet (100 mg) by mouth daily 90 tablet 3     lisinopril (ZESTRIL) 10 MG tablet Take 1 tablet (10 mg) by mouth daily 90 tablet 3     metFORMIN (GLUCOPHAGE XR) 500 MG 24 hr tablet Take 2 tablets (1,000 mg) by mouth daily 180 tablet 3     metoprolol succinate ER (TOPROL XL) 25 MG 24 hr tablet Take 0.5 tablets (12.5 mg) by mouth daily 45 tablet 1     Multiple Vitamin (TAB-A-GLADIS) TABS TAKE ONE TABLET BY MOUTH ONCE DAILY 90 tablet 0     mycophenolate (GENERIC EQUIVALENT) 250 MG capsule Take 2 capsules (500 mg) by mouth every 12 hours 120 capsule 11     order for DME 1 Device by Device route daily Knee high compression socks 15-20 mmhg. 1 Device 0     pantoprazole (PROTONIX) 40 MG EC tablet Take 1 tablet (40 mg) by mouth daily before breakfast 90 tablet 3     prednisoLONE acetate (PRED FORTE) 1 % ophthalmic suspension Place 1 drop into the right eye 4 times daily 5 mL 1     predniSONE (DELTASONE) 5 MG tablet Take 1 tablet (5 mg) by mouth daily 90 tablet 3     rosuvastatin (CRESTOR) 20 MG tablet Take 1 tablet (20 mg) by mouth daily 90 tablet 3     tamsulosin (FLOMAX) 0.4 MG capsule Take 1 capsule (0.4 mg) by mouth daily 90 capsule 3     Vitamin D3 50 mcg (2000 units) tablet Take 1 tablet (50 mcg) by  mouth daily 90 tablet 3        Patient Active Problem List   Diagnosis     Bipolar affective disorder in remission (H)     Esophageal varices determined by endoscopy (H)     Brow ptosis     Paralytic lagophthalmos of right upper eyelid     Erectile dysfunction due to diseases classified elsewhere     HCC (hepatocellular carcinoma) (H)     Equivocal stress echocardiogram     Type 2 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, unspecified whether long term insulin use (H)     Status post coronary angiogram     CAD (coronary artery disease)     Liver transplant recipient (H)     Status post liver transplantation (H)     Immunosuppressed status (H)     Benign essential hypertension     Malnutrition related to chronic disease (H)     Hypophosphatasia     Chronic kidney disease, stage 3a (H)     Malnutrition (H)     Anxiety     Mild recurrent major depression (H)     Anemia of chronic renal failure, stage 3a (H)     Rekha (H)     History of coronary artery disease     Dyslipidemia     Constipation, unspecified constipation type     Excessive sweating     Stage 3b chronic kidney disease (H)     Anemia of chronic renal failure, stage 3b (H)     NSTEMI (non-ST elevated myocardial infarction) (H)     Chest pain, unspecified type     Age-related nuclear cataract of left eye     Hypertensive chronic kidney disease with stage 5 chronic kidney disease or end stage renal disease (H)     Vitreous hemorrhage of left eye (H)     Proliferative diabetic retinopathy of both eyes associated with type 2 diabetes mellitus, unspecified proliferative retinopathy type (H)         Clinical Assessment / Barriers to Care (Per Nurse):    None at this time.     Records Location:     [facility]     Records Needed:     NONE AT THIS TIME      Additional testing needed prior to consult:     NONE AT THIS TIME    Referral updates and Plan:       Consult with Surgical Oncology     06/21/2023 11:17 AM - Called and  spoke with patient regarding referral and discussed appointment. Discussed options and finalized plan, patient was transferred to scheduling to finalized appointment details     Flaquita Soares RN, BSN   Surgical Oncology New Patient Nurse Navigator  [facility]  [phone]

## 2023-06-23 NOTE — TELEPHONE ENCOUNTER
RECORDS STATUS - ALL OTHER DIAGNOSIS      RECORDS RECEIVED FROM: [facility]/[place]   DATE RECEIVED:    NOTES STATUS DETAILS   OFFICE NOTE from referring provider Epic 6/21/23: Dr. Santi Dotson   OFFICE NOTE from medical oncologist Epic 1/27/20: Dr. Miguel Villarreal  10/28/19: Dr. Amanda Lees   OFFICE NOTE from other specialist Epic/- [place] 2/22/23: Dr. Bentley Brunner  1/14/23: Dr. Lamin Ortiz  1/13/20: Dr. Александр Ramos  4/15/19: Dr. Benigno Scott  2/5/19: Dr. Uma Moreno  10/25/16: Dr. Natalie Russell  6/20/16: Dr. Polina Vargas   DISCHARGE SUMMARY from hospital [facility] 2/13/20: [facility]  12/10/19: [facility]  12/2/19: [facility]  11/10/19: [facility]  1/24/19: [facility]  11/17/17: [facility]  11/17/2016 [facility]   OPERATIVE REPORT Epic 11/11/19: Liver Transplant   MEDICATION LIST [facility]    LABS     PATHOLOGY REPORTS Report in Epic 11/11/19: [id number]   ANYTHING RELATED TO DIAGNOSIS Epic Most recent from 6/14/23   IMAGING (NEED IMAGES & REPORT)     CT SCANS PACS 6/14/23-6/8/22: CT Abdomen  11/26/21-3/26/21: CT Chest Abdomen Pelvis  7/1/20: CT Abdomen Pelvis  1/23/19: CT Liver Ablation   MRI PACS 9/25/20-12/23/18: MRI Abdomen   XRAYS PACS 11/14/19-11/11/19: XR Abdomen   ULTRASOUND PACS 1/12/22-11/13/19: US Liver Transplant  7/1/20-12/19/16: US Abdomen  12/4/19: US Liver

## 2023-06-26 DIAGNOSIS — N18.31 CHRONIC KIDNEY DISEASE, STAGE 3A (H): ICD-10-CM

## 2023-06-26 RX ORDER — MULTIVITAMIN WITH FOLIC ACID 400 MCG
TABLET ORAL
Qty: 90 TABLET | Refills: 0 | Status: SHIPPED | OUTPATIENT
Start: 2023-06-26 | End: 2023-10-03

## 2023-06-28 ENCOUNTER — OFFICE VISIT (OUTPATIENT)
Dept: OPHTHALMOLOGY | Facility: CLINIC | Age: 58
End: 2023-06-28
Attending: OPHTHALMOLOGY
Payer: MEDICARE

## 2023-06-28 DIAGNOSIS — E11.3593 PROLIFERATIVE DIABETIC RETINOPATHY OF BOTH EYES ASSOCIATED WITH TYPE 2 DIABETES MELLITUS, UNSPECIFIED PROLIFERATIVE RETINOPATHY TYPE (H): ICD-10-CM

## 2023-06-28 DIAGNOSIS — E08.3213: ICD-10-CM

## 2023-06-28 PROCEDURE — 67028 INJECTION EYE DRUG: CPT | Mod: RT | Performed by: OPHTHALMOLOGY

## 2023-06-28 PROCEDURE — 250N000011 HC RX IP 250 OP 636: Mod: JZ | Performed by: OPHTHALMOLOGY

## 2023-06-28 PROCEDURE — 92012 INTRM OPH EXAM EST PATIENT: CPT | Mod: 25 | Performed by: OPHTHALMOLOGY

## 2023-06-28 PROCEDURE — 92134 CPTRZ OPH DX IMG PST SGM RTA: CPT | Performed by: OPHTHALMOLOGY

## 2023-06-28 PROCEDURE — G0463 HOSPITAL OUTPT CLINIC VISIT: HCPCS | Mod: 25 | Performed by: OPHTHALMOLOGY

## 2023-06-28 RX ADMIN — AFLIBERCEPT 2 MG: 40 INJECTION, SOLUTION INTRAVITREAL at 12:06

## 2023-06-28 ASSESSMENT — CONF VISUAL FIELD
OD_NORMAL: 1
OD_SUPERIOR_NASAL_RESTRICTION: 0
OD_SUPERIOR_TEMPORAL_RESTRICTION: 0
OS_SUPERIOR_NASAL_RESTRICTION: 0
OS_INFERIOR_NASAL_RESTRICTION: 0
OS_NORMAL: 1
OD_INFERIOR_NASAL_RESTRICTION: 0
OD_INFERIOR_TEMPORAL_RESTRICTION: 0
OS_SUPERIOR_TEMPORAL_RESTRICTION: 0
OS_INFERIOR_TEMPORAL_RESTRICTION: 0

## 2023-06-28 ASSESSMENT — CUP TO DISC RATIO
OS_RATIO: 0.4
OD_RATIO: 0.3

## 2023-06-28 ASSESSMENT — VISUAL ACUITY
OS_CC: 20/20
OS_CC+: -2
METHOD: SNELLEN - LINEAR
OD_CC: 20/20
OD_CC+: -2
CORRECTION_TYPE: GLASSES

## 2023-06-28 ASSESSMENT — REFRACTION_WEARINGRX
OD_AXIS: 131
OS_AXIS: 044
OD_ADD: +2.00
OD_SPHERE: -7.00
OD_CYLINDER: +0.75
OS_CYLINDER: +0.50
SPECS_TYPE: PAL
OS_SPHERE: -6.75
OS_ADD: +2.00

## 2023-06-28 ASSESSMENT — EXTERNAL EXAM - RIGHT EYE: OD_EXAM: NORMAL

## 2023-06-28 ASSESSMENT — EXTERNAL EXAM - LEFT EYE: OS_EXAM: PERIOCULAR ECCHYMOSIS

## 2023-06-28 ASSESSMENT — SLIT LAMP EXAM - LIDS
COMMENTS: NORMAL
COMMENTS: SMALL PAPILLOMA ALONG LL MARGIN, NON CONCERNING

## 2023-06-28 ASSESSMENT — TONOMETRY
IOP_METHOD: TONOPEN
OD_IOP_MMHG: 14
OS_IOP_MMHG: 12

## 2023-06-28 ASSESSMENT — PATIENT HEALTH QUESTIONNAIRE - PHQ9
SUM OF ALL RESPONSES TO PHQ QUESTIONS 1-9: 3
SUM OF ALL RESPONSES TO PHQ QUESTIONS 1-9: 3
10. IF YOU CHECKED OFF ANY PROBLEMS, HOW DIFFICULT HAVE THESE PROBLEMS MADE IT FOR YOU TO DO YOUR WORK, TAKE CARE OF THINGS AT HOME, OR GET ALONG WITH OTHER PEOPLE: SOMEWHAT DIFFICULT

## 2023-06-28 NOTE — PROGRESS NOTES
CC:  Follow up Diabetic retinopathy     Interval: Here for 4 week follow-up of Type 2 diabetes mellitus with PDR both eyes and macular edema. He has been using artificial tears for worse allergies lately which are not helping. He has intermittent achy pains around both eyes at night. He has had sinus issues worsening this year with allergy season, does not take oral allergy meds due to concerns with polypharmacy. VA subjectively unchanged, no flashes, no floaters. Last visit he received a left eye injection only.  He is working on walking more which is helping with weight loss.     Lab Results   Component                Value               Date                       A1C                      6.3                 6/14/2023       HPI: Frandy Workman is a 58 year old patient status post Cataract extraction intraocular lens left eye   history of Diabetes mellitus for 24 ys . Patient on insulin.    Interval History: s/p CEIOL left eye 1/3/22    Retinal Imaging:  OCT 06/28/23  RE: mild Increased central DME compared to last visit.    LE: No IRF/SRF, stable.    fluorescein angiography 03/29/23 both eyes normal AV and choroidal filling ou. Staining laser scars. No obvious NVE, mild late macula leakage. peripheral leakage and capillary non perfusion.     Optos consistent with clinical exam    Assessment & Plan:    #T2DM   - HbA1c 6.3% (6/2023)  - Blood pressure (<120/80) and blood glucose (HbA1c <7.0, ~6.5 today) control discussed with patient. Patient advised that failure to adequately control each may lead to vision loss. The patient expressed understanding.      # Proliferative diabetic retinopathy left eye; pre-proliferative diabetic retinopathy right eye    # vitreous hemorrhage left eye 10/12/22   Status post Eylea inj left eye   Status post Panretinal laser photocoagulation (PRP) 9.28.22 left eye and Status post scatter PRP right eye 11/02/22       # Diabetic macular edema both eyes   - status post avastin in both  eyes; Switch to Eylea 4/24/19 with improvement of Diabetic macular edema     - Tried holding off on injection 3/29/23, but edema worsened both eyes  - last Eylea right eye 5/3/23, last Eylea left eye 5/31/23.  - Plan for Eylea right eye today vs. Switch to Vabysmo vs ozurdex    # status post Cataract extraction intraocular lens both eyes   Right eye: 01/24/23; left eye 01/3/23  -IOP WNL today  Retina detachment and endophthalmitis precautions were discussed with the patient (increased blurry vision, drainage, new flashes, floaters or a curtain in the visual field) and was asked to return if any of the those occur    # Dry eye syndrome   Left eye worse than right eye   warm compresses and artificial tears  As needed  - punctal plugs previously left eye - reports this is better     # Status post liver transplant  - tx 11/2019  - severe anemia; s/p transfusion - anemia much improved   - being seen by his primary team      PLAN:  - Eylea right eye vs. Vabysmo today 06/28/23.   - plan to observe closely left eye - Diabetic macular edema currently resolved.   - Follow up in 4-6 weeks with Optical Coherence Tomography, no dilation; possible Eylea vs. Vabysmo inj both eyes     Consider changing Eylea to ozurdex left eye  inj if recurrence of intraretinal fluid     Romario Jara MD  Ophthalmology, PGY-3      ~~~~~~~~~~~~~~~~~~~~~~~~~~~~~~~~~~   Complete documentation of historical and exam elements from today's encounter can be found in the full encounter summary report (not reduplicated in this progress note).  I personally obtained the chief complaint(s) and history of present illness.  I confirmed and edited as necessary the review of systems, past medical/surgical history, family history, social history, and examination findings as documented by others; and I examined the patient myself.  I personally reviewed the relevant tests, images, and reports as documented above.  I personally reviewed the ophthalmic test(s)  associated with this encounter, agree with the interpretation(s) as documented by the resident/fellow, and have edited the corresponding report(s) as necessary.   I formulated and edited as necessary the assessment and plan and discussed the findings and management plan with the patient and family and No resident or fellow assisted with the procedures performed.  I performed the procedures myself.    Enriqueta Maritn MD   of Ophthalmology.  Retina Service   Department of Ophthalmology and Visual Neurosciences   [facility]  Phone: [phone]   Fax: [phone]

## 2023-06-28 NOTE — NURSING NOTE
Chief Complaints and History of Present Illnesses   Patient presents with     Follow Up     4 week follow up w possible injection  Pt reports no change in vision since last visit  Art tears bid BE  Blood sugar 129 this am after breakfast  [employer] 9:43 AM June 28, 2023        Chief Complaint(s) and History of Present Illness(es)     Follow Up            Laterality: both eyes    Associated symptoms: itching.  Negative for eye pain, flashes and floaters    Treatments tried: artificial tears    Response to treatment: no improvement    Comments: 4 week follow up w possible injection  Pt reports no change in vision since last visit  Art tears bid BE  Blood sugar 129 this am after breakfast  [employer] 9:43 AM June 28, 2023

## 2023-06-29 ENCOUNTER — VIRTUAL VISIT (OUTPATIENT)
Dept: BEHAVIORAL HEALTH | Facility: CLINIC | Age: 58
End: 2023-06-29
Payer: MEDICARE

## 2023-06-29 DIAGNOSIS — F31.31 BIPOLAR 1 DISORDER, DEPRESSED, MILD (H): Primary | ICD-10-CM

## 2023-06-29 DIAGNOSIS — F41.1 GENERALIZED ANXIETY DISORDER: ICD-10-CM

## 2023-06-29 PROCEDURE — 90834 PSYTX W PT 45 MINUTES: CPT | Mod: 95 | Performed by: PSYCHOLOGIST

## 2023-06-29 NOTE — PROGRESS NOTES
[facility]: Integrated Behavioral Health  June 29, 2023        Behavioral Health Clinician Progress Note    Patient Name: Frandy Workman           Service Type: Video visit      Service Location:  Video visit      Session Start Time: 3:01  Session End Time: 3:42      Session Length: 38 - 52      Attendees: Patient    Visit Activities (Refresh list every visit): [facility] Only     Service Modality:  Video Visit:      Provider verified identity through the following two step process.  Patient provided:  Patient photo and Patient is known previously to provider    Telemedicine Visit: The patient's condition can be safely assessed and treated via synchronous audio and visual telemedicine encounter.      Reason for Telemedicine Visit: Services only offered telehealth    Originating Site (Patient Location): Patient's home    Distant Site (Provider Location): Provider Remote Setting- Home Office    Consent:  The patient/guardian has verbally consented to: the potential risks and benefits of telemedicine (video visit) versus in person care; bill my insurance or make self-payment for services provided; and responsibility for payment of non-covered services.     Patient would like the video invitation sent by:  My Chart    Mode of Communication:  Video Conference via [employer]    Distant Location (Provider):  Off-site    As the provider I attest to compliance with applicable laws and regulations related to telemedicine.      Diagnostic Assessment Date:  12/03/2020  Treatment Plan Review Date:  7/18/2023  See Flowsheets for today's PHQ-9 and LIZZETTE-7 results  Previous PHQ-9:       5/8/2023     5:56 PM 5/17/2023     3:37 PM 6/28/2023     4:07 PM   PHQ-9 SCORE   PHQ-9 Total Score MyChart 6 (Mild depression)  3 (Minimal depression)   PHQ-9 Total Score 6 6 3     Previous LIZZETTE-7:       4/7/2021    12:34 PM 9/30/2021     1:45 PM 5/17/2023     3:37 PM   LIZZETTE-7 SCORE   Total Score 9 (mild anxiety)     Total Score  9 10 6       Extended Session (60+ minutes): No  Interactive Complexity: No  Crisis: No    Treatment Objective(s) Addressed in This Session:  Target Behavior(s): disease management/lifestyle changes  related to adaptive approaches to managing anxious distress and mood difficulties    Depressed mood: Increase interest, pleasure in doing things.      Current Stressors / Issues:  [facility] met with Miller today for a follow-up, to help Miller continue to feel supported in his health behavior change and overall mental health.      Miller reports today he has good news and bad news. He shares today that his bad news is that his cancer has made a return and is currently seeking options for care. Writer provided supportive therapy and we discussed what he is considering for options in his care moving forward. Miller states the good news of for him is that he has drastically improved his daily walking and engagement in doing things - Miller notes he is up to walking around 9K to 10K steps per day and has observed himself engaging with more people as a result. He states this has drastically improved his mood and overall sense of wellbeing, which this writer supported. Provided encouragement to continue with his habit of walking and engagement with others. Provided supportive therapy and affirmation of the steps he has taken toward change/maintaining change.     Answers for HPI/ROS submitted by the patient on 7/12/2022  If you checked off any problems, how difficult have these problems made it for you to do your work, take care of things at home, or get along with other people?: Somewhat difficult  PHQ9 TOTAL SCORE: 4    Answers for HPI/ROS submitted by the patient on 9/8/2022  If you checked off any problems, how difficult have these problems made it for you to do your work, take care of things at home, or get along with other people?: Somewhat difficult  PHQ9 TOTAL SCORE: 3    Answers for HPI/ROS submitted by the patient on 11/21/2022  If you  checked off any problems, how difficult have these problems made it for you to do your work, take care of things at home, or get along with other people?: Very difficult  PHQ9 TOTAL SCORE: 4      Answers for HPI/ROS submitted by the patient on 1/18/2023  If you checked off any problems, how difficult have these problems made it for you to do your work, take care of things at home, or get along with other people?: Extremely difficult  PHQ9 TOTAL SCORE: 8        Progress on Treatment Objective(s) / Homework:  Stable - ACTION (Actively working towards change); Intervened by reinforcing change plan / affirming steps taken      Solution-Focused Therapy    Explore patterns in patient's relationships and discuss options for new behaviors.    Explore patterns in patient's actions and choices and discuss options for new behaviors.    Behavioral Activation    Discuss steps patient can take to become more involved in meaningful activity.    Identify barriers to these activities and explore possible solutions.      Answers for HPI/ROS submitted by the patient on 3/21/2022  If you checked off any problems, how difficult have these problems made it for you to do your work, take care of things at home, or get along with other people?: Not difficult at all  PHQ9 TOTAL SCORE: 6      Answers for HPI/ROS submitted by the patient on 2/8/2022  If you checked off any problems, how difficult have these problems made it for you to do your work, take care of things at home, or get along with other people?: Somewhat difficult  PHQ9 TOTAL SCORE: 4      Answers for HPI/ROS submitted by the patient on 4/7/2021   LIZZETTE 7 TOTAL SCORE: 9  If you checked off any problems, how difficult have these problems made it for you to do your work, take care of things at home, or get along with other people?: Very difficult  PHQ9 TOTAL SCORE: 7*    *No SI    Answers for HPI/ROS submitted by the patient on 10/13/2020   If you checked off any problems, how  difficult have these problems made it for you to do your work, take care of things at home, or get along with other people?: Somewhat difficult  PHQ9 TOTAL SCORE: 8*  LIZZETTE 7 TOTAL SCORE: 6      *No SI     Answers for HPI/ROS submitted by the patient on 12/29/2020   If you checked off any problems, how difficult have these problems made it for you to do your work, take care of things at home, or get along with other people?: Somewhat difficult  PHQ9 TOTAL SCORE: 5*  LIZZETTE 7 TOTAL SCORE: 8    *No SI     Answers for HPI/ROS submitted by the patient on 11/21/2022  If you checked off any problems, how difficult have these problems made it for you to do your work, take care of things at home, or get along with other people?: Very difficult  PHQ9 TOTAL SCORE: 4          Care Plan review completed: no    Medication Review:  No changes to current psychiatric medication(s)     Reports discontinuing zolpidem.       Current Outpatient Medications   Medication     acetaminophen (TYLENOL) 325 MG tablet     ammonium lactate (AMLACTIN) 12 % external cream     aspirin (SM ASPIRIN ADULT LOW STRENGTH) 81 MG EC tablet     BD VIKTORIA U/F 32G X 4 MM insulin pen needle     benzoyl peroxide 5 % external liquid     Continuous Blood Gluc Sensor (FREESTYLE CLAUDIA 2 SENSOR) MISC     empagliflozin (JARDIANCE) 10 MG TABS tablet     insulin aspart (NOVOLOG PEN) 100 UNIT/ML pen     insulin degludec (TRESIBA FLEXTOUCH) 100 UNIT/ML pen     ketoconazole (NIZORAL) 2 % external shampoo     ketorolac (ACULAR) 0.5 % ophthalmic solution     lamiVUDine (EPIVIR) 100 MG tablet     lisinopril (ZESTRIL) 10 MG tablet     metFORMIN (GLUCOPHAGE XR) 500 MG 24 hr tablet     metoprolol succinate ER (TOPROL XL) 25 MG 24 hr tablet     Multiple Vitamin (TAB-A-GLADIS) TABS     mycophenolate (GENERIC EQUIVALENT) 250 MG capsule     order for DME     pantoprazole (PROTONIX) 40 MG EC tablet     prednisoLONE acetate (PRED FORTE) 1 % ophthalmic suspension     predniSONE (DELTASONE) 5  MG tablet     rosuvastatin (CRESTOR) 20 MG tablet     tamsulosin (FLOMAX) 0.4 MG capsule     Vitamin D3 50 mcg (2000 units) tablet     Current Facility-Administered Medications   Medication     aflibercept (EYLEA) injection prefilled syringe 2 mg     aflibercept (EYLEA) injection prefilled syringe 2 mg     dexamethasone (OZURDEX) intravitreal implant 0.7 mg     dexamethasone (OZURDEX) intravitreal implant 0.7 mg     faricimab-svoa (VABYSMO) intravitreal injection 6 mg     faricimab-svoa (VABYSMO) intravitreal injection 6 mg     lidocaine (PF) (XYLOCAINE) injection 1 mL       Medication Compliance:  Yes    Changes in Health Issues:   None reported    Chemical Use Review:   Substance Use: Chemical use reviewed, no active concerns identified      Tobacco Use: No current tobacco use.         Assessment: Current Emotional / Mental Status (status of significant symptoms):  Risk status (Self / Other harm or suicidal ideation)  Patient denies a history of suicidal ideation, suicide attempts, self-injurious behavior, homicidal ideation, homicidal behavior and and other safety concerns     Patient denies current fears or concerns for personal safety.  Patient denies current or recent suicidal ideation or behaviors.  Patient denies current or recent homicidal ideation or behaviors.  Patient denies current or recent self injurious behavior or ideation.  Patient denies other safety concerns.     A safety and risk management plan has not been developed at this time, however patient was encouraged to call [place] [phone] should there be a change in any of these risk factors.    [place] Suicide Severity Rating Scale (Short Version)      12/10/2019     5:00 PM 6/11/2020     9:18 PM 7/1/2020    11:00 AM 7/12/2021     2:30 PM 1/10/2022     9:28 PM 1/3/2023     6:31 AM 1/24/2023     6:22 AM   [place] Suicide Severity Rating (Short Version)   Over the past 2 weeks have you felt down, depressed, or hopeless? yes no no no no no no    Over the past 2 weeks have you had thoughts of killing yourself? no no no no no no    Have you ever attempted to kill yourself? no no no no no no    Q1 Wished to be Dead (Past Month) no     no    Q2 Suicidal Thoughts (Past Month) no     no    Q3 Suicidal Thought Method no     no    Q4 Suicidal Intent without Specific Plan no     no    Q5 Suicide Intent with Specific Plan no     no    Q6 Suicide Behavior (Lifetime) no     no    Level of Risk per Screen      low risk          Appearance:   Appropriate   Eye Contact:   Good   Psychomotor Behavior: TD evident   Attitude:   Cooperative  Interested Friendly Pleasant  Orientation:   All  Speech   Rate / Production: Normal/ Responsive   Volume:  Normal   Mood:    Depressed ; improving  Affect:    Appropriate    Thought Content:  Clear   Thought Form:  Goal Directed  Logical   Insight:    Good     Diagnoses:  1. Bipolar 1 disorder, depressed, mild (H)    2. Generalized anxiety disorder          Collateral Reports Completed:  Not Applicable    Plan: (Homework, other):  Continue with this writer for individual psychotherapy in 3 wks. He will continue to work on managing depression and anxiety through achievable behavioral activation ideas. Information about behavioral activation provided  Today he plans to continue walking 9k to 10k steps per day.  No CD issues.     Joseph Murillo Psy.D, LP   Behavioral Health Clinician   [facility] [place]              ______________________________________________________________________                                            Individual Treatment Plan    Patient's Name: Frandy Workman  YOB: 1965    Date of Creation: 3/1/2022  Date Treatment Plan Last Reviewed/Revised: 4/18/2023    DSM5 Diagnoses: 296.51 Bipolar I Disorder Current or Most Recent Episode Depressed, Mild or 300.02 (F41.1) Generalized Anxiety Disorder  Psychosocial / Contextual Factors: Post Solid Organ Tx  PROMIS (reviewed every 90  days): 4    Referral / Collaboration:  Referral to another professional/service is not indicated at this time..    Anticipated number of session for this episode of care: 4-6  Anticipation frequency of session: Every other week  Anticipated Duration of each session: 38-52 minutes  Treatment plan will be reviewed in 90 days or when goals have been changed.       MeasurableTreatment Goal(s) related to diagnosis / functional impairment(s)  Goal 1: Patient will experience a reduction in depressive symptoms along with a corresponding increase in positive emotion and life satisfaction.      Objective #A (Patient Action)    Patient will Increase interest, engagement, and pleasure in doing things.  Status: Continued - Date(s): 4/18/2023    Intervention(s)  Therapist will help patient identify pleasant and mastery oriented events that elicit positive, relaxed mood.    Objective #B  Patient will Decrease frequency and intensity of feeling down, depressed, hopeless.  Status: Continued - Date(s): 4/18/2023    Intervention(s)  Therapist will introduce patient to cognitive-behavioral and acceptance and commitment therapy topics aimed to help reduce depression and anxiety    Objective #C  Patient will Identify negative self-talk and behaviors: challenge core beliefs, myths, and actions  Improve concentration, focus, and mindfulness in daily activities .  Status: Continued - Date(s): COMPLETE    Intervention(s)  Therapist will help patient identify and manage negative self-talk and automatic thoughts; introduce patient to cognitive distortions; help patient develop cognitive diffusion techniques      Goal 2: Patient will experience a reduction in anxious symptoms, along with a corresponding increase in relaxed emotional states and life satisfaction.      Objective #A (Patient Action)  Patient will use cognitive-behavioral and thought diffusion strategies identified in therapy to challenge anxious thoughts.    Status: Continued -  "Date(s): COMPLETE    Intervention(s)  Therapist will utilize CBT and ACT ideas to help patient challenge anxious thoughts and reduce intensity/duration of anxious distress    Objective #B  Patient will use \"worry time\" each day for 15 minutes of scheduled worry and then defer obsessive or anxious thinking until the next structured worry time.    Status: Continued - Date(s): COMPLETE    Intervention(s)  Therapist will teach patient how to effectively utilize worry time and/or thought logs/journals each day and incorporate more relaxation behaviors into their routine.    Objective #C  Patient will identify the stressors which contribute to feelings of anxiety  Patient will increase engagement in adaptive coping skills and recreational activities, such as exercise and healthy socialization, to manage distress.  Status: Continued - Date(s): COMPLETE    Intervention(s)  Therapist will help patient identify triggers/situational factors that contribute to anxiety and behavioral skills aimed to manage anxious distress.      Goal 3: Patiient will work toward adaptively managing bipolar-related depression and manic episodes      Objective #A (Patient Action)    Status: Continued - Date(s): COMPLETE    Patient will identify 2-3 signs or signals of emerging mood instability.    Intervention(s)  Therapist will provide educational materials on bipolar disorder and help identifying triggers for mood instability.    Objective #B  Patient will identify 2-3 strategies for more effectively managing Bipolar Disorder.    Status: Continued - Date(s): COMPLETE    Intervention(s)  Therapist will teach emotional recognition/identification. Skills aimed to effectively manage bipolar disorder.      Other Possible Therapeutic Intervention(s):    Psycho-education regarding mental health diagnoses and treatment options    Supportive Therapy    Provide affirmations, reflections, and establish working rapport    Emphasize and reflect on strength " of therapeutic relationship    Skills training    Explore skills useful to client in current situation.    Skills include assertiveness, communication, conflict management, problem-solving, relaxation, etc.    Solution-Focused Therapy    Explore patterns in patient's relationships and discuss options for new behaviors.    Explore patterns in patient's actions and choices and discuss options for new behaviors.    Cognitive-behavioral Therapy    Discuss common cognitive distortions, identify them in patient's life.    Explore ways to challenge, replace, and act against these cognitions.    Acceptance and Commitment Therapy    Explore and identify important values in patient's life.    Discuss ways to commit to behavioral activation around these values.    Psychodynamic psychotherapy    Discuss patient's emotional dynamics and issues and how they impact behaviors.    Explore patient's history of relationships and how they impact present behaviors.    Explore how to work with and make changes in these schemas and patterns.    Narrative Therapy    Explore the patient's story of his/her life from his/her perspective.    Explore alternate ways of understanding their experience, identifying exceptions, developing new themes.    Interpersonal Psychotherapy    Explore patterns in relationships that are effective or ineffective at helping patient reach their goals, find satisfying experience.    Discuss new patterns or behaviors to engage in for improved social functioning.    Behavioral Activation    Discuss steps patient can take to become more involved in meaningful activity.    Identify barriers to these activities and explore possible solutions.    Mindfulness-Based Strategies    Discuss skills based on development and application of mindfulness.    Skills drawn from compassion-focused therapy, dialectical behavior therapy, mindfulness-based stress reduction, mindfulness-based cognitive therapy, etc.      Patient has  reviewed and agreed to the above plan.      Joseph Murillo Psy.D, LP   Behavioral Health Clinician   -[facility]     4/18/2023  Answers for HPI/ROS submitted by the patient on 2/28/2023  If you checked off any problems, how difficult have these problems made it for you to do your work, take care of things at home, or get along with other people?: Very difficult  PHQ9 TOTAL SCORE: 6    Answers for HPI/ROS submitted by the patient on 6/28/2023  If you checked off any problems, how difficult have these problems made it for you to do your work, take care of things at home, or get along with other people?: Somewhat difficult  PHQ9 TOTAL SCORE: 3

## 2023-07-01 DIAGNOSIS — E11.3293 TYPE 2 DIABETES MELLITUS WITH MILD NONPROLIFERATIVE RETINOPATHY OF BOTH EYES WITHOUT MACULAR EDEMA, UNSPECIFIED WHETHER LONG TERM INSULIN USE (H): Primary | ICD-10-CM

## 2023-07-01 NOTE — TELEPHONE ENCOUNTER
Pt is requesting new rx for    Alcohol prep 70% pads    Did not see on active med list please verify and send new rx    [facility]  [phone]

## 2023-07-03 ENCOUNTER — PRE VISIT (OUTPATIENT)
Dept: SURGERY | Facility: CLINIC | Age: 58
End: 2023-07-03
Payer: MEDICARE

## 2023-07-03 ENCOUNTER — ONCOLOGY VISIT (OUTPATIENT)
Dept: SURGERY | Facility: CLINIC | Age: 58
End: 2023-07-03
Attending: INTERNAL MEDICINE
Payer: MEDICARE

## 2023-07-03 VITALS
HEIGHT: 68 IN | BODY MASS INDEX: 31.05 KG/M2 | OXYGEN SATURATION: 99 % | DIASTOLIC BLOOD PRESSURE: 66 MMHG | HEART RATE: 87 BPM | SYSTOLIC BLOOD PRESSURE: 109 MMHG | TEMPERATURE: 98.8 F | WEIGHT: 204.9 LBS | RESPIRATION RATE: 18 BRPM

## 2023-07-03 DIAGNOSIS — C22.0 HCC (HEPATOCELLULAR CARCINOMA) (H): ICD-10-CM

## 2023-07-03 DIAGNOSIS — R93.5 ABNORMAL CT OF THE ABDOMEN: ICD-10-CM

## 2023-07-03 DIAGNOSIS — Z94.4 STATUS POST LIVER TRANSPLANTATION (H): ICD-10-CM

## 2023-07-03 PROCEDURE — G0463 HOSPITAL OUTPT CLINIC VISIT: HCPCS | Performed by: SURGERY

## 2023-07-03 PROCEDURE — 99204 OFFICE O/P NEW MOD 45 MIN: CPT | Performed by: SURGERY

## 2023-07-03 RX ORDER — UBIQUINOL 100 MG
CAPSULE ORAL
Qty: 400 EACH | Refills: 1 | Status: SHIPPED | OUTPATIENT
Start: 2023-07-03 | End: 2024-07-24

## 2023-07-03 ASSESSMENT — PAIN SCALES - GENERAL: PAINLEVEL: NO PAIN (0)

## 2023-07-03 NOTE — TELEPHONE ENCOUNTER
Alcohol prep 70% pads    4/25/2023  [facility] [place]     Frannie Vasquez PA-C  Endocrinology, Diabetes, and Metabolism

## 2023-07-03 NOTE — LETTER
7/3/2023         RE: Frandy Workman  [address] [address]        Dear Colleague,    Thank you for referring your patient, Frandy Workman, to the [facility]. Please see a copy of my visit note below.     Dictation on: 07/03/2023 12:30 PM by: RIANA CAICEDO [[id number]]         REASON FOR EVALUATION:  Suspected recurrent hepatocellular cancer.    HISTORY OF PRESENT ILLNESS:  Mr. Workman is a 58-year-old gentleman who underwent liver transplantation in 11/2019 for cirrhosis with hepatocellular carcinoma.  He has done well subsequently and has been under the care of Dr. Villarreal.  Recently, he has been found to have a rise in alpha-fetoprotein up to 26.7.  Imaging has revealed 2 soft tissue nodules in the abdomen which are concerning for possible peritoneal recurrence.  The largest lesion measures about 1.5 cm and it lies immediately adjacent to his ascending colon, his right lateral abdomen just beneath the lateral aspect of his surgical incision from his transplant.  I was asked to see Mr. Workman because these lesions are not amenable to percutaneous biopsy and Dr. Villarreal is wondering whether laparoscopic surgical biopsy might be possible.    I met with Mr. Workman and reviewed his imaging with him.  The lesion of most concern, which is slowly enlarging with time, is adjacent to the ascending colon just under the lateral aspect of his previous surgical incision.    I do believe this is amenable to surgical biopsy.  I discussed that, based on its location, I think we could make a small incision and potentially perform the biopsy through a few centimeter incision along his previous surgical incision.  If the lesion is not immediately apparent through that incision, we could certainly also tried to insufflate his abdomen and perform a full laparoscopy.  Either way, I do believe this could be performed as an outpatient procedure.  I discussed with the patient some of the  risks of surgery, including the possible need for multiple small laparoscopic incisions, bleeding, infection, and unintentional injury to other organs or structures.  I also did discuss the small possibility that we could not identify the lesion on surgical exploration and we would have to abort without any biopsy being done.  He seems to understand the situation quite well and wishes to proceed as quickly as possible.    We will get Mr. Workman on the schedule for a planned open but possibly laparoscopic peritoneal biopsy    A total of 30 minutes was spent in face-to-face time with Mr. Workman today.  An additional 15 minutes was spent in review of history, imaging and coordination of his care.      Ruiz Chu MD

## 2023-07-03 NOTE — NURSING NOTE
"Oncology Rooming Note    July 3, 2023 9:28 AM   Frandy Workman is a 58 year old male who presents for:    Chief Complaint   Patient presents with     Oncology Clinic Visit     HCC     Initial Vitals: /66   Pulse 87   Temp 98.8  F (37.1  C) (Oral)   Resp 18   Ht 1.738 m (5' 8.41\")   Wt 92.9 kg (204 lb 14.4 oz)   SpO2 99%   BMI 30.79 kg/m   Estimated body mass index is 30.79 kg/m  as calculated from the following:    Height as of this encounter: 1.738 m (5' 8.41\").    Weight as of this encounter: 92.9 kg (204 lb 14.4 oz). Body surface area is 2.12 meters squared.  No Pain (0) Comment: Data Unavailable   No LMP for male patient.  Allergies reviewed: Yes  Medications reviewed: Yes    Medications: Medication refills not needed today.  Pharmacy name entered into [employer]:    [facility] - [place], [place] - [address] [facility] [place], [place] - [phone]    Clinical concerns:        Hannah Rogers CMA              "

## 2023-07-03 NOTE — PROGRESS NOTES
REASON FOR EVALUATION:  Suspected recurrent hepatocellular cancer.    HISTORY OF PRESENT ILLNESS:  Mr. Workman is a 58-year-old gentleman who underwent liver transplantation in 11/2019 for cirrhosis with hepatocellular carcinoma.  He has done well subsequently and has been under the care of Dr. Villarreal.  Recently, he has been found to have a rise in alpha-fetoprotein up to 26.7.  Imaging has revealed 2 soft tissue nodules in the abdomen which are concerning for possible peritoneal recurrence.  The largest lesion measures about 1.5 cm and it lies immediately adjacent to his ascending colon, his right lateral abdomen just beneath the lateral aspect of his surgical incision from his transplant.  I was asked to see Mr. Workman because these lesions are not amenable to percutaneous biopsy and Dr. Villarreal is wondering whether laparoscopic surgical biopsy might be possible.    I met with Mr. Workman and reviewed his imaging with him.  The lesion of most concern, which is slowly enlarging with time, is adjacent to the ascending colon just under the lateral aspect of his previous surgical incision.    I do believe this is amenable to surgical biopsy.  I discussed that, based on its location, I think we could make a small incision and potentially perform the biopsy through a few centimeter incision along his previous surgical incision.  If the lesion is not immediately apparent through that incision, we could certainly also tried to insufflate his abdomen and perform a full laparoscopy.  Either way, I do believe this could be performed as an outpatient procedure.  I discussed with the patient some of the risks of surgery, including the possible need for multiple small laparoscopic incisions, bleeding, infection, and unintentional injury to other organs or structures.  I also did discuss the small possibility that we could not identify the lesion on surgical exploration and we would have to abort without any biopsy being done.   He seems to understand the situation quite well and wishes to proceed as quickly as possible.    We will get Mr. Workman on the schedule for a planned open but possibly laparoscopic peritoneal biopsy    A total of 30 minutes was spent in face-to-face time with Mr. Workman today.  An additional 15 minutes was spent in review of history, imaging and coordination of his care.

## 2023-07-05 ENCOUNTER — TELEPHONE (OUTPATIENT)
Dept: SURGERY | Facility: CLINIC | Age: 58
End: 2023-07-05
Payer: MEDICARE

## 2023-07-05 NOTE — NURSING NOTE
PSG and follow-up appointment with provider have both been scheduled.  [facility] has  requested records multiple times from [place], from more than one location, and they all state they do not have any sleep records for this patient or that they do not have this patient at all.  Kayla Lee, [facility]

## 2023-07-05 NOTE — TELEPHONE ENCOUNTER
RN Care Coordinator: Flaquita Soares RN     Surgery is scheduled with Dr. Ruiz Chu  Date: 7/13/2023   Location: [facility].  Scheduled per next available date    H&P to be completed by PAC clinic on 7/11/2023 at 12:30pm via video     COVID-19 test: n/a    Post-op: 7/26/2023 at 10:00am via telephone, telephone visit     Patient will receive surgery arrival and start time from PAC.    Spoke with the patient and was able to confirm all scheduled information.     The surgery packet was provided by the [facility]    Surgery and appointment overview was sent via [employer]    ______________    Katiuska Soares on 7/5/2023 at 9:10 AM  P: [phone]

## 2023-07-06 NOTE — TELEPHONE ENCOUNTER
FUTURE VISIT INFORMATION      SURGERY INFORMATION:    Date: 23    Location: [facility] or    Surgeon:  Ruiz Chu MD    Anesthesia Type:  general    Procedure: Open peritoneal biopsies possible laparoscopic peritoneal biopsies    RECORDS REQUESTED FROM:       Primary Care Provider: Lamin Ortiz MD- [facility]    Pertinent Medical History: CAD, hypertension, NSTEMI    Most recent EKG+ Tracin/10/22    Most recent ECHO: 3/15/23    Most recent Coronary Angiogram: 21

## 2023-07-10 LAB
ABO/RH(D): NORMAL
ANTIBODY SCREEN: NEGATIVE
SPECIMEN EXPIRATION DATE: NORMAL

## 2023-07-10 NOTE — PROGRESS NOTES
Preoperative Assessment Center Medication History Note  Medication history completed on July 10, 2023 by this writer prior to patient's PAC appointment. See Epic admission navigator for prior to admission medications. Operating room staff will still need to confirm medications and last dose information on day of surgery.     Medication history interview sources  Patient and CareEverywhere/SureScripts via phone    Changes made to PTA medication list    Added: neuriva supplement     Deleted: pred forte eye drops (stopped awhile back).     Changed: metformin dose (higher doses give diarrhea),     Allergies reviewed with patient and updates made in EHR: yes    -- pt already holding vitamins, but didn't know to hold neuriva (last took 7/10), and Jardiance last on 7/10 AM.  Per Flaquita MELO (RN with Dr. Chu) ok to remain on aspirin 81 mg daily.   -- No recent (within 30 days) course of antibiotics  -- is on daily prednisone 5 mg daily.   -- Reports being on blood thinning medications -    -- Declines being on any other prescription or over-the-counter medications    Prior to Admission medications    Medication Sig Last Dose Taking? Auth Provider Long Term End Date   acetaminophen (TYLENOL) 325 MG tablet Take 1 tablet (325 mg) by mouth every 6 hours as needed for mild pain Taking Yes Eloy Rao MD     ammonium lactate (AMLACTIN) 12 % external cream Apply topically 2 times daily To feet. Taking Yes Lamin Gonzalez DPM     aspirin (SM ASPIRIN ADULT LOW STRENGTH) 81 MG EC tablet Take 2 tablets (162 mg) by mouth daily  Patient taking differently: Take 162 mg by mouth every evening Taking Yes Layton Romero MD No    benzoyl peroxide 5 % external liquid Use topically in showers as a body wash Taking Yes Omar Lyon MD     empagliflozin (JARDIANCE) 10 MG TABS tablet Take 1 tablet (10 mg) by mouth daily Taking Yes Frannie Vasquez PA-C No    insulin aspart (NOVOLOG PEN) 100 UNIT/ML pen Inject 5-10 Units  Subcutaneous 4 times daily (with meals and nightly) 1unit : 10 g carb before meals.  Also add 1 unit : 50 mg/dl >180 before meals and at bedtime. Taking Yes Frannie Vasquez PA-C Yes    insulin degludec (TRESIBA FLEXTOUCH) 100 UNIT/ML pen Inject 36 units subcutaneous once daily  Patient taking differently: Inject 36 Units Subcutaneous every morning Taking Yes Frannie Vasquez PA-C No    ketoconazole (NIZORAL) 2 % external shampoo Apply thin layer topically to scalp in shower (leave on 5 min prior to rinse); may also use as a body wash Taking Yes Omar Lyon MD     lamiVUDine (EPIVIR) 100 MG tablet Take 1 tablet (100 mg) by mouth daily Taking Yes Santi Rosas MD     lisinopril (ZESTRIL) 10 MG tablet Take 1 tablet (10 mg) by mouth daily  Patient taking differently: Take 10 mg by mouth every evening Taking Yes Eloy Rao MD Yes    metFORMIN (GLUCOPHAGE XR) 500 MG 24 hr tablet Take 2 tablets (1,000 mg) by mouth daily  Patient taking differently: Take 500 mg by mouth daily Taking Yes Frannie Vasquez PA-C Yes    metoprolol succinate ER (TOPROL XL) 25 MG 24 hr tablet Take 0.5 tablets (12.5 mg) by mouth daily  Patient taking differently: Take 12.5 mg by mouth every evening Taking Yes Manjeet Ireland MD Yes    Misc Natural Products (NEURIVA PO) Take 2 tablets by mouth daily Taking Yes Unknown, Entered By History     mycophenolate (GENERIC EQUIVALENT) 250 MG capsule Take 2 capsules (500 mg) by mouth every 12 hours Taking Yes Santi Rosas MD Yes    pantoprazole (PROTONIX) 40 MG EC tablet Take 1 tablet (40 mg) by mouth daily before breakfast Taking Yes Santi Rosas MD     predniSONE (DELTASONE) 5 MG tablet Take 1 tablet (5 mg) by mouth daily Taking Yes Santi Rosas MD No    rosuvastatin (CRESTOR) 20 MG tablet Take 1 tablet (20 mg) by mouth daily  Patient taking differently: Take 20 mg by mouth every evening Taking Yes Manjeet Ireland MD Yes     tamsulosin (FLOMAX) 0.4 MG capsule Take 1 capsule (0.4 mg) by mouth daily Taking Yes Lamin Ortiz MD No    Alcohol Swabs (ALCOHOL PREP) 70 % PADS Use for glucose testing 4x daily  and insulin administration 4x daily.   Frannie Vasquez PA-C     BD VIKTORIA U/F 32G X 4 MM insulin pen needle Use 5 per day   Frannie Vasquez PA-C     Continuous Blood Gluc Sensor (FREESTYLE CLAUDIA 2 SENSOR) [facility] 1 each See Admin Instructions Change every 14 days.   Frannie Vasquez PA-C Yes    Multiple Vitamin (TAB-A-GLADIS) TABS TAKE ONE TABLET BY MOUTH ONCE DAILY  Patient not taking: Reported on 7/10/2023 Not Taking  Lamin Ortiz MD     order for DME 1 Device by Device route daily Knee high compression socks 15-20 mmhg.   Lamin Gonzalez DPM     Vitamin D3 50 mcg (2000 units) tablet Take 1 tablet (50 mcg) by mouth daily  Patient not taking: Reported on 7/10/2023 Not Taking  Bentley Brunner MD           Medication History Completed By: Zohaib Wright KASSY 7/10/2023 4:33 PM

## 2023-07-11 ENCOUNTER — LAB (OUTPATIENT)
Dept: LAB | Facility: CLINIC | Age: 58
End: 2023-07-11
Payer: MEDICARE

## 2023-07-11 ENCOUNTER — ANESTHESIA EVENT (OUTPATIENT)
Dept: SURGERY | Facility: CLINIC | Age: 58
DRG: 336 | End: 2023-07-11
Payer: MEDICARE

## 2023-07-11 ENCOUNTER — VIRTUAL VISIT (OUTPATIENT)
Dept: SURGERY | Facility: CLINIC | Age: 58
End: 2023-07-11
Payer: MEDICARE

## 2023-07-11 ENCOUNTER — PRE VISIT (OUTPATIENT)
Dept: SURGERY | Facility: CLINIC | Age: 58
End: 2023-07-11

## 2023-07-11 DIAGNOSIS — Z01.818 PREOP EXAMINATION: ICD-10-CM

## 2023-07-11 DIAGNOSIS — Z92.89 HISTORY OF BLOOD TRANSFUSION: ICD-10-CM

## 2023-07-11 DIAGNOSIS — R93.5 ABNORMAL CT OF THE ABDOMEN: ICD-10-CM

## 2023-07-11 DIAGNOSIS — Z01.818 PREOP EXAMINATION: Primary | ICD-10-CM

## 2023-07-11 PROCEDURE — 99215 OFFICE O/P EST HI 40 MIN: CPT | Mod: VID | Performed by: CLINICAL NURSE SPECIALIST

## 2023-07-11 PROCEDURE — 36415 COLL VENOUS BLD VENIPUNCTURE: CPT | Performed by: PATHOLOGY

## 2023-07-11 PROCEDURE — 86850 RBC ANTIBODY SCREEN: CPT | Performed by: CLINICAL NURSE SPECIALIST

## 2023-07-11 PROCEDURE — 86901 BLOOD TYPING SEROLOGIC RH(D): CPT | Performed by: CLINICAL NURSE SPECIALIST

## 2023-07-11 RX ORDER — PSEUDOEPHEDRINE HCL 60 MG
60 TABLET ORAL EVERY 4 HOURS PRN
COMMUNITY
End: 2023-08-10

## 2023-07-11 ASSESSMENT — LIFESTYLE VARIABLES: TOBACCO_USE: 0

## 2023-07-11 ASSESSMENT — ENCOUNTER SYMPTOMS
SEIZURES: 0
DYSRHYTHMIAS: 0

## 2023-07-11 NOTE — H&P
Pre-Operative H & P     CC:  Preoperative exam to assess for increased cardiopulmonary risk while undergoing surgery and anesthesia.    Date of Encounter: 7/11/2023  Primary Care Physician:  Lamin Singh     Reason for visit:   Encounter Diagnoses   Name Primary?     History of blood transfusion      Preop examination Yes     Abnormal CT of the abdomen        HPI  Frandy Workman is a 58 year old male who presents for pre-operative H & P in preparation for  Procedure Information     Case: [id number] Date/Time: 07/13/23 1155    Procedures:       Open peritoneal biopsies (Abdomen)      possible laparoscopic peritoneal biopsies (Abdomen)    Anesthesia type: General    Diagnosis:       Status post liver transplantation (H) [Z94.4]      HCC (hepatocellular carcinoma) (H) [C22.0]      Abnormal CT of the abdomen [R93.5]    Pre-op diagnosis:       Status post liver transplantation (H) [Z94.4]      HCC (hepatocellular carcinoma) (H) [C22.0]      Abnormal CT of the abdomen [R93.5]    [facility]  [facility]    Providers: Ruiz Chu MD        History is obtained from the patient, and chart review    Patient with history of liver transplantation in 11/2019, for cirrhosis with hepatocellular carcinoma. He has been followed over time by Dr. Banuelos and Dr. Villarreal.  Recently, he was found to have a rise in alpha-fetoprotein up to 26.7.  His imaging has revealed 2 soft tissue nodules in the abdomen, concerning for possible peritoneal recurrence. The largest lesion measures about 1.5 cm and it lies immediately adjacent to his ascending colon; right lateral abdomen just beneath the lateral aspect of his surgical incision from his transplant. It was felt that the lesions were not amenable to percutaneous biopsy and he was referred to Dr. Chu in consideration for surgical biopsy. He was counseled for above procedure.    Patient's history is otherwise significant for obesity, dyslipidemia, HYPERTENSION, CAD, s/p CAB X 2  in 2021, former smoking, GERD, Hep B, type 2 diabetes mellitus, CKD stage 3, anemia, BPH, OA, and depression. He continues to be followed by Cardiology, last seen 5/8/23.       Hx of abnormal bleeding or anti-platelet use: ASA 81 mg daily      Past Medical History  Past Medical History:   Diagnosis Date     Anemia 2013     Arthritis      BPH (benign prostatic hyperplasia)      CAD (coronary artery disease) 04/01/2019     Cholelithiasis      Conductive hearing loss 08/16/2017     Depressive disorder 1986    Suffer effects throughout life     Gastroesophageal reflux disease 12/01/2014     HCC (hepatocellular carcinoma) (H) 01/22/2019     History of diabetic retinopathy 07/2018     HTN (hypertension) 11/20/2019     Hyperlipidemia      Liver cirrhosis secondary to ESTRADA (H)      Liver transplanted (H) 11/11/2019     Portal vein thrombosis 04/11/2019     Type II diabetes mellitus (H)        Past Surgical History  Past Surgical History:   Procedure Laterality Date     BYPASS GRAFT ARTERY CORONARY N/A 7/14/2021    Procedure: median sternotomy, on cardiopulmonary bypass, CORONARY ARTERY BYPASS GRAFT (CABG) x2 with left greater saphenous vein endoscopic harvest and left internal mammery artery harvest;  Surgeon: Tom Zapata MD;  [facility]     COLONOSCOPY      2015     COLONOSCOPY N/A 12/6/2019    Procedure: COLONOSCOPY, WITH POLYPECTOMY AND BIOPSY;  Surgeon: Adam Morton MD;  [facility]     CV CENTRAL VENOUS CATHETER PLACEMENT N/A 7/12/2021    Procedure: Central Venous Catheter Placement;  Surgeon: Fermin Polanco MD;  Location: [facility] CARDIAC CATH LAB     CV CORONARY ANGIOGRAM N/A 7/12/2021    Procedure: Coronary Angiogram;  Surgeon: Fermin Polanco MD;  Location: [facility] CARDIAC CATH LAB     CV HEART CATHETERIZATION WITH POSSIBLE INTERVENTION N/A 2/26/2019    Procedure: CORS;  Surgeon: Jagdish Hoyt MD;  Location: [facility] CARDIAC CATH LAB     CV INTRA  AORTIC BALLOON N/A 7/12/2021    Procedure: Intra Aortic Balloon Pump Insertion;  Surgeon: Fermin Polanco MD;  Location:  [facility]ART CARDIAC CATH LAB     ESOPHAGOSCOPY, GASTROSCOPY, DUODENOSCOPY (EGD), COMBINED N/A 11/17/2016    Procedure: COMBINED ESOPHAGOSCOPY, GASTROSCOPY, DUODENOSCOPY (EGD);  Surgeon: Santi Rosas MD;  Location:  GI     ESOPHAGOSCOPY, GASTROSCOPY, DUODENOSCOPY (EGD), COMBINED N/A 11/17/2017    Procedure: COMBINED ESOPHAGOSCOPY, GASTROSCOPY, DUODENOSCOPY (EGD);  EGD;  Surgeon: Santi Rosas MD;  Location:  GI     ESOPHAGOSCOPY, GASTROSCOPY, DUODENOSCOPY (EGD), COMBINED N/A 12/28/2018    Procedure: EGD;  Surgeon: Santi Rosas MD;  Location:  OR     ESOPHAGOSCOPY, GASTROSCOPY, DUODENOSCOPY (EGD), COMBINED N/A 12/6/2019    Procedure: ESOPHAGOGASTRODUODENOSCOPY, WITH BIOPSY;  Surgeon: Adam Morton MD;  Location:  GI     ESOPHAGOSCOPY, GASTROSCOPY, DUODENOSCOPY (EGD), COMBINED N/A 2/13/2020    Procedure: ESOPHAGOGASTRODUODENOSCOPY (EGD);  Surgeon: Santi Rosas MD;  Location:  GI     HEAD & NECK SURGERY      12/2017 at [facility].      IMPLANT GOLD WEIGHT EYELID Right 11/16/2017    Procedure: IMPLANT WEIGHT EYELID;  Right Upper Eyelid Weight, right tarsal strip lower eyelid;  Surgeon: Milana Malave MD;  [facility]     IR CHEMO EMBOLIZATION  1/22/2019     KNEE SURGERY Left      ORTHOPEDIC SURGERY       PAROTIDECTOMY, RADICAL NECK DISSECTION Right 11/2/2017    Procedure: PAROTIDECTOMY, RADICAL NECK DISSECTION;  Right Superfacial Parotidectomy , Facial nerve repair. with [facility] facial nerve monitor.;  Surgeon: Asiya Morgan MD;  [facility]     PHACOEMULSIFICATION CLEAR CORNEA WITH STANDARD INTRAOCULAR LENS IMPLANT Right 1/24/2023    Procedure: PHACOEMULSIFICATION, COMPLEX CATARACT, WITH INTRAOCULAR LENS IMPLANT WITH TRYPAN RIGHT EYE;  Surgeon: Enriqueta Martin MD;  [facility]     PHACOEMULSIFICATION WITH  STANDARD INTRAOCULAR LENS IMPLANT Left 1/3/2023    Procedure: PHACOEMULSIFICATION, COMPLEX CATARACT, WITH STANDARD INTRAOCULAR LENS IMPLANT INSERTION LEFT EYE;  Surgeon: Enriqueta Martin MD;  Location: [facility] OR     PICC INSERTION Left 2017    4fr SL BioFlo PICC, 44cm in the L basilic vein w/ tip in the low SVC     RETURN LIVER TRANSPLANT N/A 2019    Procedure: Exploratory laparotomy, hematoma evacuation, abdominal washout;  Surgeon: Александр Ramos MD;  [facility]     TRANSPLANT LIVER RECIPIENT  DONOR N/A 2019    Procedure: TRANSPLANT, LIVER, RECIPIENT,  DONOR;  Surgeon: Александр Ramos MD;  [facility]     VASCULAR SURGERY         Prior to Admission Medications  Current Outpatient Medications   Medication Sig Dispense Refill     acetaminophen (TYLENOL) 325 MG tablet Take 1 tablet (325 mg) by mouth every 6 hours as needed for mild pain 90 tablet 3     ammonium lactate (AMLACTIN) 12 % external cream Apply topically 2 times daily To feet. 140 g 5     aspirin (SM ASPIRIN ADULT LOW STRENGTH) 81 MG EC tablet Take 2 tablets (162 mg) by mouth daily (Patient taking differently: Take 162 mg by mouth every evening) 180 tablet 3     benzoyl peroxide 5 % external liquid Use topically in showers as a body wash 226 g 11     empagliflozin (JARDIANCE) 10 MG TABS tablet Take 1 tablet (10 mg) by mouth daily 90 tablet 3     insulin aspart (NOVOLOG PEN) 100 UNIT/ML pen Inject 5-10 Units Subcutaneous 4 times daily (with meals and nightly) 1unit : 10 g carb before meals.  Also add 1 unit : 50 mg/dl >180 before meals and at bedtime. 45 mL 3     insulin degludec (TRESIBA FLEXTOUCH) 100 UNIT/ML pen Inject 36 units subcutaneous once daily (Patient taking differently: Inject 36 Units Subcutaneous every morning) 45 mL 3     ketoconazole (NIZORAL) 2 % external shampoo Apply thin layer topically to scalp in shower (leave on 5 min prior to rinse); may also use as a body wash 120 mL 11      lamiVUDine (EPIVIR) 100 MG tablet Take 1 tablet (100 mg) by mouth daily 90 tablet 3     lisinopril (ZESTRIL) 10 MG tablet Take 1 tablet (10 mg) by mouth daily (Patient taking differently: Take 10 mg by mouth every evening) 90 tablet 3     metFORMIN (GLUCOPHAGE XR) 500 MG 24 hr tablet Take 2 tablets (1,000 mg) by mouth daily (Patient taking differently: Take 500 mg by mouth daily) 180 tablet 3     metoprolol succinate ER (TOPROL XL) 25 MG 24 hr tablet Take 0.5 tablets (12.5 mg) by mouth daily (Patient taking differently: Take 12.5 mg by mouth every evening) 45 tablet 1     Misc Natural Products (NEURIVA PO) Take 2 tablets by mouth daily       mycophenolate (GENERIC EQUIVALENT) 250 MG capsule Take 2 capsules (500 mg) by mouth every 12 hours 120 capsule 11     pantoprazole (PROTONIX) 40 MG EC tablet Take 1 tablet (40 mg) by mouth daily before breakfast 90 tablet 3     predniSONE (DELTASONE) 5 MG tablet Take 1 tablet (5 mg) by mouth daily 90 tablet 3     pseudoePHEDrine (SUDAFED) 60 MG tablet Take 60 mg by mouth every 4 hours as needed for congestion       rosuvastatin (CRESTOR) 20 MG tablet Take 1 tablet (20 mg) by mouth daily (Patient taking differently: Take 20 mg by mouth every evening) 90 tablet 3     tamsulosin (FLOMAX) 0.4 MG capsule Take 1 capsule (0.4 mg) by mouth daily 90 capsule 3     Alcohol Swabs (ALCOHOL PREP) 70 % PADS Use for glucose testing 4x daily  and insulin administration 4x daily. 400 each 1     BD VIKTORIA U/F 32G X 4 MM insulin pen needle Use 5 per day 300 each 3     Continuous Blood Gluc Sensor (FREESTYLE CLAUDIA 2 SENSOR) [facility] 1 each See Admin Instructions Change every 14 days. 7 each 3     Multiple Vitamin (TAB-A-GLADIS) TABS TAKE ONE TABLET BY MOUTH ONCE DAILY (Patient not taking: Reported on 7/10/2023) 90 tablet 0     order for DME 1 Device by Device route daily Knee high compression socks 15-20 mmhg. 1 Device 0     Vitamin D3 50 mcg (2000 units) tablet Take 1 tablet (50 mcg) by mouth daily  (Patient not taking: Reported on 7/10/2023) 90 tablet 3       Allergies  Allergies   Allergen Reactions     Codeine Other (See Comments)     Cannot take due to liver  Cannot tolerate oral narcotics     Seasonal Allergies      Sneezing, coughing, runny and itchy eyes       Social History  Social History     Socioeconomic History     Marital status:      Spouse name: Not on file     Number of children: Not on file     Years of education: Not on file     Highest education level: Bachelor's degree (e.g., BA, AB, BS)   Occupational History     Not on file   Tobacco Use     Smoking status: Former     Packs/day: 6.00     Years: 30.00     Pack years: 180.00     Types: Cigars, Cigarettes     Start date: 2016     Quit date: 10/25/2017     Years since quittin.7     Passive exposure: Past     Smokeless tobacco: Former     Types: Chew     Quit date: 10/31/2017     Tobacco comments:     1 tin per week   Substance and Sexual Activity     Alcohol use: No     Alcohol/week: 0.0 standard drinks of alcohol     Comment: quit 1996     Drug use: No     Sexual activity: Not Currently     Partners: Female     Birth control/protection: Condom   Other Topics Concern     Parent/sibling w/ CABG, MI or angioplasty before 65F 55M? Yes   Social History Narrative    [place] [facility] [facility]     Social Determinants of Health     Financial Resource Strain: Low Risk  (10/13/2020)    Overall Financial Resource Strain (CARDIA)      Difficulty of Paying Living Expenses: Not very hard   Food Insecurity: No Food Insecurity (10/13/2020)    Hunger Vital Sign      Worried About Running Out of Food in the Last Year: Never true      Ran Out of Food in the Last Year: Never true   Transportation Needs: No Transportation Needs (10/13/2020)    PRAPARE - Transportation      Lack of Transportation (Medical): No      Lack of Transportation (Non-Medical): No   Physical Activity: Insufficiently Active (10/13/2020)    Exercise Vital Sign      Days  of Exercise per Week: 1 day      Minutes of Exercise per Session: 60 min   Stress: Stress Concern Present (10/13/2020)    [employer] [place] [employer] - Occupational Stress Questionnaire      Feeling of Stress : To some extent   Social Connections: Socially Isolated (10/13/2020)    Social Connection and Isolation Panel [NHANES]      Frequency of Communication with Friends and Family: Once a week      Frequency of Social Gatherings with Friends and Family: Once a week      Attends Rastafarian Services: Never      Active Member of Clubs or Organizations: No      Attends Club or Organization Meetings: Never      Marital Status:    Intimate Partner Violence: Not At Risk (6/28/2023)    Humiliation, Afraid, Rape, and Kick questionnaire      Fear of Current or Ex-Partner: No      Emotionally Abused: No      Physically Abused: No      Sexually Abused: No   Housing Stability: Low Risk  (10/13/2020)    Housing Stability Vital Sign      Unable to Pay for Housing in the Last Year: No      Number of Places Lived in the Last Year: 2      Unstable Housing in the Last Year: No       Family History  Family History   Problem Relation Age of Onset     Skin Cancer Mother      Cancer Mother         mastectomy     Diabetes Mother      Cerebrovascular Disease Mother      Thyroid Disease Mother      Depression Mother      Colon Cancer Father 60     Pancreatic Cancer Father 60     Prostate Cancer Father      Macular Degeneration Father      Glaucoma Father      Skin Cancer Father      Thyroid Disease Sister      Depression Sister      Asthma Sister      Sleep Apnea Brother      Colorectal Cancer Maternal Grandmother      Cancer Maternal Grandmother      Substance Abuse Maternal Grandmother         Alcohol     Prostate Cancer Maternal Grandfather      Substance Abuse Maternal Grandfather         Alcohol     Colorectal Cancer Paternal Grandmother      Liver Disease No family hx of      Melanoma No family hx of      Anesthesia  Reaction No family hx of      Deep Vein Thrombosis (DVT) No family hx of        Review of Systems  The complete review of systems is negative other than noted in the HPI or here.   Anesthesia Evaluation   Pt has had prior anesthetic. Type: General and MAC.    No history of anesthetic complications       ROS/MED HX  ENT/Pulmonary: Comment: Reports sleep study scheduled for 10/2023    (+) ARIELA risk factors, hypertension,  (-) tobacco use and recent URI   Neurologic:  - neg neurologic ROS  (-) no seizures and no CVA   Cardiovascular:     (+) Dyslipidemia hypertension--CAD -past MI CABG-date: 7/2021 X 2. -Taking blood thinners Pt has not received instructions: Instructions Given to patient: Will remain on ASA 81 per surgeon. Previous cardiac testing   Echo: Date: 3/15/22 Results:  Interpretation Summary  Left ventricular function is normal.The ejection fraction is > 65%. Abnormal  non-specific septal motion is present.  Global right ventricular function is normal. The RV is not clearly visualized  but the size and function are probably normal. The RV FAC is 40%.  There are no significant valvular abnormalities.  The estimated PA systolic pressure is 29 mmHg.  IVC diameter <2.1 cm collapsing >50% with sniff suggests a normal RA pressure  of 3 mmHg.  This study was compared with the study from 01/11/2022. There is no  significant change in the biventricular size/function upon direct visual  comparison.  Stress Test: Date: Results:    ECG Reviewed: Date: 1/2022 Results:  Sinus tachycardia  Cath: Date: 2021 Results:   (-) ROONEY and arrhythmias   METS/Exercise Tolerance: >4 METS Comment: Walking ~ 5 miles daily   Hematologic:     (+) anemia, history of blood transfusion, no previous transfusion reaction,  (-) history of blood clots   Musculoskeletal:   (+) arthritis,     GI/Hepatic: Comment: HCC s/p liver transplant    (+) GERD, Asymptomatic on medication, liver disease,     Renal/Genitourinary:     (+) renal disease, type:  CRI, Pt does not require dialysis,     Endo:     (+) type II DM, Last HgA1c: 6.3, date: 6/14/23, Using insulin, - not using insulin pump. Normal glucose range: 160, Chronic steroid usage for Post Transplant Immunosuppression. Date most recently used: 7/11/23.     Psychiatric/Substance Use:     (+) psychiatric history depression     Infectious Disease:  - neg infectious disease ROS     Malignancy: Comment: HCC  (+) Malignancy,     Other:  - neg other ROS          Virtual visit -  No vitals were obtained    Physical Exam  Constitutional: Awake, alert, no apparent distress, and appears stated age.  HENT: Normocephalic; no teeth  Respiratory: Had just gotten back from his walk and climbed stairs. Mild shortness of breath when he started talking, resolved quickly; no cough  Neurologic: Oriented to name, place and time.   Neuropsychiatric: Calm, cooperative. Normal affect.      Prior Labs/Diagnostic Studies   All labs and imaging personally reviewed   Lab Results   Component Value Date    WBC 7.6 06/14/2023    WBC 4.4 06/28/2021     Lab Results   Component Value Date    RBC 3.95 06/14/2023    RBC 2.35 06/28/2021     Lab Results   Component Value Date    HGB 12.3 06/14/2023    HGB 7.3 06/28/2021     Lab Results   Component Value Date    HCT 38.1 06/14/2023    HCT 22.6 06/28/2021     Lab Results   Component Value Date    MCV 97 06/14/2023    MCV 96 06/28/2021     Lab Results   Component Value Date    MCH 31.1 06/14/2023    MCH 31.1 06/28/2021     Lab Results   Component Value Date    MCHC 32.3 06/14/2023    MCHC 32.3 06/28/2021     Lab Results   Component Value Date    RDW 14.1 06/14/2023    RDW 15.1 06/28/2021     Lab Results   Component Value Date     06/14/2023     06/28/2021     Last Comprehensive Metabolic Panel:  Sodium   Date Value Ref Range Status   06/14/2023 140 136 - 145 mmol/L Final   06/28/2021 137 133 - 144 mmol/L Final     Potassium   Date Value Ref Range Status   06/14/2023 5.0 3.4 - 5.3 mmol/L  Final   07/20/2022 4.7 3.4 - 5.3 mmol/L Final   06/28/2021 4.6 3.4 - 5.3 mmol/L Final     Chloride   Date Value Ref Range Status   06/14/2023 106 98 - 107 mmol/L Final   07/20/2022 105 94 - 109 mmol/L Final   06/28/2021 107 94 - 109 mmol/L Final     Carbon Dioxide   Date Value Ref Range Status   06/28/2021 25 20 - 32 mmol/L Final     Carbon Dioxide (CO2)   Date Value Ref Range Status   06/14/2023 23 22 - 29 mmol/L Final   07/20/2022 26 20 - 32 mmol/L Final     Anion Gap   Date Value Ref Range Status   06/14/2023 11 7 - 15 mmol/L Final   07/20/2022 7 3 - 14 mmol/L Final   06/28/2021 5 3 - 14 mmol/L Final     Glucose   Date Value Ref Range Status   06/14/2023 133 (H) 70 - 99 mg/dL Final   07/20/2022 269 (H) 70 - 99 mg/dL Final   06/28/2021 208 (H) 70 - 99 mg/dL Final     GLUCOSE BY METER POCT   Date Value Ref Range Status   01/24/2023 181 (H) 70 - 99 mg/dL Final     Urea Nitrogen   Date Value Ref Range Status   06/14/2023 34.0 (H) 8.0 - 23.0 mg/dL Final   07/20/2022 32 (H) 7 - 30 mg/dL Final   06/28/2021 33 (H) 7 - 30 mg/dL Final     Creatinine   Date Value Ref Range Status   06/14/2023 1.88 (H) 0.67 - 1.17 mg/dL Final   06/28/2021 1.62 (H) 0.66 - 1.25 mg/dL Final     Creatinine POCT   Date Value Ref Range Status   06/14/2023 2.0 (H) 0.7 - 1.3 mg/dL Final     GFR Estimate   Date Value Ref Range Status   06/14/2023 41 (L) >60 mL/min/1.73m2 Final     Comment:     eGFR calculated using 2021 CKD-EPI equation.   06/28/2021 46 (L) >60 mL/min/[1.73_m2] Final     Comment:     Non  GFR Calc  Starting 12/18/2018, serum creatinine based estimated GFR (eGFR) will be   calculated using the Chronic Kidney Disease Epidemiology Collaboration   (CKD-EPI) equation.       GFR, ESTIMATED POCT   Date Value Ref Range Status   06/14/2023 38 (L) >60 mL/min/1.73m2 Final     Calcium   Date Value Ref Range Status   06/14/2023 9.7 8.6 - 10.0 mg/dL Final   06/28/2021 9.1 8.5 - 10.1 mg/dL Final     Bilirubin Total   Date Value Ref  Range Status   2023 0.4 <=1.2 mg/dL Final   2021 0.5 0.2 - 1.3 mg/dL Final     Alkaline Phosphatase   Date Value Ref Range Status   2023 93 40 - 129 U/L Final   2021 98 40 - 150 U/L Final     ALT   Date Value Ref Range Status   2023 28 0 - 70 U/L Final     Comment:     Reference intervals for this test were updated on 2023 to more accurately reflect our healthy population. There may be differences in the flagging of prior results with similar values performed with this method. Interpretation of those prior results can be made in the context of the updated reference intervals.     2021 20 0 - 70 U/L Final     AST   Date Value Ref Range Status   2023 21 0 - 45 U/L Final     Comment:     Reference intervals for this test were updated on 2023 to more accurately reflect our healthy population. There may be differences in the flagging of prior results with similar values performed with this method. Interpretation of those prior results can be made in the context of the updated reference intervals.   2021 9 0 - 45 U/L Final     TSH    1.24    EK/10/22 Sinus tachycardia, rate 113    Echocardiogram  3/15/22  Interpretation Summary  Left ventricular function is normal.The ejection fraction is > 65%. Abnormal  non-specific septal motion is present.  Global right ventricular function is normal. The RV is not clearly visualized  but the size and function are probably normal. The RV FAC is 40%.  There are no significant valvular abnormalities.  The estimated PA systolic pressure is 29 mmHg.  IVC diameter <2.1 cm collapsing >50% with sniff suggests a normal RA pressure  of 3 mmHg.  This study was compared with the study from 2022. There is no  significant change in the biventricular size/function upon direct visual  comparison.     19 Coronary angiogram:  1. Severe liver disease undergoing transplant evaluation.  2. Moderate coronary artery disease of the  ostial left main (40-50% stenosis) which does not appear to be flow-limiting based on minimal luminal area of 7.2mm2 by IVUS.  3. Radial artery sheath removed and hemostasis achieved with a TR band.     Coronary angiogram 7/12/2021  Severe distal Left main disease at trifurcation of LCx, LAD, and Ramus.     Operative report 7/27/2021:  Coronary artery bypass grafting x 2.  - Left internal mammary artery to left anterior descending artery  - Reversed saphenous vein graft to obtuse marginal artery 2     2021 Carotid US                                                   IMPRESSION:     1. RIGHT ICA: Normal.     2. LEFT ICA:  Less than 50% diameter narrowing by grayscale imaging  and sonographic velocity criteria.     6/14/23 CT A/P                                           IMPRESSION:     1. Postsurgical changes of liver transplant. No definite suspicious  arterial enhancing intrahepatic observation.  2. Enhancing soft tissue nodule along the cecum, additional soft  tissue density nodularity in the omentum, concerning for neoplastic  possible metastatic peritoneal implants/metastatic disease.  3. Additional findings similar to prior CT from 12/14/2022 including  findings of splenomegaly, portosystemic collaterals, compression  deformity of T11 vertebral body superior endplate.  4. Similar sub-6 mm pulmonary nodule. No new or enlarging pulmonary  nodule. Consider attention on follow-up.  5. Similar subcentimeter hypoattenuating observation in the pancreatic  head, possible IPMN. Consider attention on follow-up     The patient's records and results personally reviewed by this provider.     Outside records reviewed from: Care Everywhere      Assessment      Frandy Workman is a 58 year old male seen as a PAC referral for risk assessment and optimization for anesthesia.    Plan/Recommendations  Pt will be optimized for the proposed procedure.  See below for details on the assessment, risk, and preoperative  recommendations    NEUROLOGY  - No history of TIA, CVA or seizure  -Post Op delirium risk factors:  High co-morbid index and History of prior delirium  Reported history of paranoia and hallucinations after his liver transplant    ENT  - No current airway concerns.  Will need to be reassessed day of surgery.  Mallampati: Unable to assess  TM: Unable to assess   No teeth    CARDIAC  HLD. Crestor at HS. HYPERTENSION. Metoprolol and lisinopril at HS. CAD, s/p CAB X 2 as above, followed by Cardiology. ASA 81 mg daily and will remain on per surgery team. All testing above. Last EF on echocardiogram >65%. Patient denies chest pain, irregular HR or DYSPNEA ON EXERTION. Walking ~5 miles daily.   - METS (Metabolic Equivalents)>4    RCRI: 11% risk of serious cardiac events    PULMONARY  Denies asthma, cough or shortness of breath  Recent allergy symptoms. Sudafed prn. Will avoid on DOS    Intermediate to high risk for ARIELA  Reports he has a sleep study scheduled in 10/2023.  - Tobacco History      History   Smoking Status     Former     Packs/day: 6.00     Years: 30.00     Types: Cigars, Cigarettes     Start date: 5/1/2016     Quit date: 10/25/2017   Smokeless Tobacco     Former     Types: Chew     Quit date: 10/31/2017     GI: Denies GERD. Will take Protonix on DOS.  History of liver transplant in 2019 for HCC. Now concern for recurrent HCC on imaging and with rising AFP. Followed by Hepatology, transplant maintained on immunosuppression. Will take mycophenolate and prednisone on DOS.   Chronic steroid use. Final decisions regarding stress dose steroids by Anesthesia on DOS.    Will take lamivudine on DOS for Hep B    PONV Low Risk  Total Score: 1           1 AN PONV: Patient is not a current smoker        /RENAL  - Baseline Creatinine Last 2.00  - CKD. Followed by Nephrology.   Reports Jardiance was started for his kidneys first. Last dose 7/10/23  - BPH Will take Flomax on DOS.     ENDOCRINE   - BMI: Estimated body mass  "index is 30.79 kg/m  as calculated from the following:    Height as of 7/3/23: 1.738 m (5' 8.41\").    Weight as of 7/3/23: 92.9 kg (204 lb 14.4 oz).  Obesity (BMI >30)  DIABETES MELLITUS II. Last A1C 6.3 Will hold metformin and Novolog insulin on DOS. Will take 80% of Tresiba on DOS.    HEME  VTE Low Risk 0.5%            Total Score: 2    VTE: Male       Denies history of blood clots  Past history of blood transfusion, last ~ one year ago  Will have updated type and screen prior to surgery.     Education regarding blood antibody provided to patient today    Different anesthesia methods/types have been discussed with the patient, but they are aware that the final plan will be decided by the assigned anesthesia provider on the date of service.  Patient was discussed with Dr Terrell    The patient is optimized for their procedure. AVS with information on surgery time/arrival time, meds and NPO status given by nursing staff. No further diagnostic testing indicated.    Please refer to the physical examination documented by the anesthesiologist in the anesthesia record on the day of surgery.    Video-Visit Details    Type of service:  Video Visit    Provider received verbal consent for a Video Visit from the patient? Yes   Video Start Time: 12:29pm   Video End Time: 12:44pm    Originating Location (pt. Location): Home    Distant Location (provider location):  Off-site  Mode of Communication:  Video Conference via [employer]  On the day of service:     Prep time: 17 minutes  Visit time: 15 minutes  Documentation time: 18 minutes  ------------------------------------------  Total time: 50 minutes      SHANIQUE Le CNS  Preoperative Assessment Center  [facility]  Clinic and Surgery Center  Phone: [phone]  Fax: [phone]  "

## 2023-07-11 NOTE — PATIENT INSTRUCTIONS
Preparing for Your Surgery      Name:  Frandy Workman   MRN:  [id number]   :  1965   Today's Date:  2023       Arriving for surgery:  Surgery date:  23  Arrival time:  10:00 AM      Please come to:         [facility] [address]  [address] [place]  [address]      The [facility] Patient /Visitor Ramp is located at [address]. Patients and visitors who self-park will receive the reduced hospital parking rate. If the Patient /Visitor Ramp is full, please follow the signs to the [employer] parking located at the main hospital entrance.     parking is available ( 24 hours/ 7 days a week)    Discounted parking pass options are available for patients and visitors. They can be purchased at the [employer] desk at the main hospital entrance.    -    Stop at the security desk and they will direct surgery patients to the 3rd floor Surgery Waiting Room. [phone]     -  If you are in need of directions, wheelchair or escort please stop at the Information/security desk in the lobby.       What can I eat or drink?  -  You may eat and drink normally up to 8 hours prior to arrival time.   -  You may have clear liquids until 2 hours prior to arrival time.    Examples of clear liquids:  Water  Clear broth  Juices (apple, white grape, white cranberry  and cider) without pulp  Noncarbonated, powder based beverages  (lemonade and Kye-Aid)  Sodas (Sprite, 7-Up, ginger ale and seltzer)  Coffee or tea (without milk or cream)  Gatorade    -  No Alcohol or cannabis products for at least 24 hours before surgery.     Which medicines can I take?    Hold Multivitamins for 7 days before surgery.  Hold Supplements for 7 days before surgery.  Hold Ibuprofen (Advil, Motrin) for 1 day(s) before surgery--unless otherwise directed by surgeon.  Hold Naproxen (Aleve) for 4 days before surgery.  HOLD JARDIANCE X 3 DAYS BEFORE SURGERY - LAST  DOSE JARDIANCE ON 7/10/23.     HOLD NEURIVA X 3 DAYS BEFORE SURGERY - LAST DOSE OF NEURIVA ON 7/10/23.     TAKE 29 UNITS OF TRESIBA INSULIN AM OF SURGERY.  -  DO NOT take these medications the day of surgery:  Novolog insulin + metformin.    -  PLEASE TAKE these medications the day of surgery:  Tylenol if needed; take other morning medications.    How do I prepare myself?  - Please take 2 showers (one the night prior to surgery and one the morning of surgery) using Scrubcare or Hibiclens soap.    Use this soap only from the neck to your toes.     Leave the soap on your skin for one minute--then rinse thoroughly.      You may use your own shampoo and conditioner. No other hair products.   - Please remove all jewelry and body piercings.  - No lotions, deodorants or fragrance.  - No makeup or fingernail polish.   - Bring your ID and insurance card.    -If you have a Deep Brain Stimulator, Spinal Cord Stimulator, or any Neuro Stimulator device---you must bring the remote control to the hospital.      ALL PATIENTS GOING HOME THE SAME DAY OF SURGERY ARE REQUIRED TO HAVE A RESPONSIBLE ADULT TO DRIVE AND BE IN ATTENDANCE WITH THEM FOR 24 HOURS FOLLOWING SURGERY.    Covid testing policy as of 12/06/2022  Your surgeon will notify and schedule you for a COVID test if one is needed before surgery--please direct any questions or COVID symptoms to your surgeon      Questions or Concerns:    - For any questions regarding the day of surgery or your hospital stay, please contact the Pre Admission Nursing Office at [phone].       - If you have health changes between today and your surgery, please call your surgeon.       - For questions after surgery, please call your surgeons office.           Current Visitor Guidelines    You may have 2 visitors in the pre op area.    Visiting hours: 8 a.m. to 8:30 p.m.    You may have four visitors during your inpatient hospital stay.    Patients confirmed or suspected to have symptoms of  COVID 19 or flu:     No visitors allowed for adult patients.   Children (under age 18) can have 1 named visitor.     People who are sick or showing symptoms of COVID 19 or flu:    Are not allowed to visit patients--we can only make exceptions in special situations.       Please follow these guidelines for your visit:          Please maintain social distance          Masking is optional--however at times you may be asked to wear a mask for the safety of yourself and others     Clean your hands with alcohol hand . Do this when you arrive at and leave the building and patient room,    And again after you touch your mask or anything in the room.     Go directly to and from the room you are visiting.     Stay in the patient s room during your visit. Limit going to other places in the hospital as much as possible     Leave bags and jackets at home or in the car.     For everyone s health, please don t come and go during your visit. That includes for smoking   during your visit.

## 2023-07-11 NOTE — PROGRESS NOTES
Miller is a 58 year old who is being evaluated via a billable video visit.      How would you like to obtain your AVS? MyChart      Subjective   Miller is a 58 year old, presenting for the following health issues:  Pre-Op Exam (/)    HPI           Review of Systems       Physical Exam     DINO Quinones LPN

## 2023-07-13 ENCOUNTER — ANESTHESIA (OUTPATIENT)
Dept: SURGERY | Facility: CLINIC | Age: 58
DRG: 336 | End: 2023-07-13
Payer: MEDICARE

## 2023-07-13 ENCOUNTER — HOSPITAL ENCOUNTER (INPATIENT)
Facility: CLINIC | Age: 58
LOS: 1 days | Discharge: HOME | DRG: 336 | End: 2023-07-13
Attending: SURGERY | Admitting: SURGERY
Payer: MEDICARE

## 2023-07-13 VITALS
HEIGHT: 69 IN | BODY MASS INDEX: 30.17 KG/M2 | RESPIRATION RATE: 16 BRPM | SYSTOLIC BLOOD PRESSURE: 148 MMHG | DIASTOLIC BLOOD PRESSURE: 75 MMHG | WEIGHT: 203.71 LBS | HEART RATE: 91 BPM | OXYGEN SATURATION: 95 % | TEMPERATURE: 98.6 F

## 2023-07-13 DIAGNOSIS — C22.0 HEPATOCELLULAR CARCINOMA (H): Primary | ICD-10-CM

## 2023-07-13 LAB — GLUCOSE BLDC GLUCOMTR-MCNC: 205 MG/DL (ref 70–99)

## 2023-07-13 PROCEDURE — 0WJG0ZZ INSPECTION OF PERITONEAL CAVITY, OPEN APPROACH: ICD-10-PCS | Performed by: SURGERY

## 2023-07-13 PROCEDURE — 0DBW4ZZ EXCISION OF PERITONEUM, PERCUTANEOUS ENDOSCOPIC APPROACH: ICD-10-PCS | Performed by: SURGERY

## 2023-07-13 PROCEDURE — 710N000010 HC RECOVERY PHASE 1, LEVEL 2, PER MIN: Performed by: SURGERY

## 2023-07-13 PROCEDURE — 49329 UNLSTD LAPS PX ABD PERTM&OMN: CPT | Performed by: SURGERY

## 2023-07-13 PROCEDURE — 250N000011 HC RX IP 250 OP 636: Performed by: ANESTHESIOLOGY

## 2023-07-13 PROCEDURE — 88331 PATH CONSLTJ SURG 1 BLK 1SPC: CPT | Mod: 26 | Performed by: PATHOLOGY

## 2023-07-13 PROCEDURE — 250N000011 HC RX IP 250 OP 636: Performed by: SURGERY

## 2023-07-13 PROCEDURE — 250N000011 HC RX IP 250 OP 636: Mod: JZ | Performed by: STUDENT IN AN ORGANIZED HEALTH CARE EDUCATION/TRAINING PROGRAM

## 2023-07-13 PROCEDURE — 710N000012 HC RECOVERY PHASE 2, PER MINUTE: Performed by: SURGERY

## 2023-07-13 PROCEDURE — 360N000075 HC SURGERY LEVEL 2, PER MIN: Performed by: SURGERY

## 2023-07-13 PROCEDURE — 250N000011 HC RX IP 250 OP 636: Performed by: STUDENT IN AN ORGANIZED HEALTH CARE EDUCATION/TRAINING PROGRAM

## 2023-07-13 PROCEDURE — 272N000001 HC OR GENERAL SUPPLY STERILE: Performed by: SURGERY

## 2023-07-13 PROCEDURE — 370N000017 HC ANESTHESIA TECHNICAL FEE, PER MIN: Performed by: SURGERY

## 2023-07-13 PROCEDURE — 88305 TISSUE EXAM BY PATHOLOGIST: CPT | Mod: 26 | Performed by: PATHOLOGY

## 2023-07-13 PROCEDURE — 250N000009 HC RX 250: Performed by: ANESTHESIOLOGY

## 2023-07-13 PROCEDURE — 0DNW4ZZ RELEASE PERITONEUM, PERCUTANEOUS ENDOSCOPIC APPROACH: ICD-10-PCS | Performed by: SURGERY

## 2023-07-13 PROCEDURE — 88305 TISSUE EXAM BY PATHOLOGIST: CPT | Mod: TC | Performed by: SURGERY

## 2023-07-13 PROCEDURE — 258N000003 HC RX IP 258 OP 636: Performed by: ANESTHESIOLOGY

## 2023-07-13 PROCEDURE — 120N000002 HC R&B MED SURG/OB UMMC

## 2023-07-13 PROCEDURE — 999N000141 HC STATISTIC PRE-PROCEDURE NURSING ASSESSMENT: Performed by: SURGERY

## 2023-07-13 PROCEDURE — C9290 INJ, BUPIVACAINE LIPOSOME: HCPCS | Performed by: STUDENT IN AN ORGANIZED HEALTH CARE EDUCATION/TRAINING PROGRAM

## 2023-07-13 RX ORDER — OXYCODONE HYDROCHLORIDE 5 MG/1
5 TABLET ORAL
Status: DISCONTINUED | OUTPATIENT
Start: 2023-07-13 | End: 2023-07-13 | Stop reason: HOSPADM

## 2023-07-13 RX ORDER — NALOXONE HYDROCHLORIDE 0.4 MG/ML
0.4 INJECTION, SOLUTION INTRAMUSCULAR; INTRAVENOUS; SUBCUTANEOUS
Status: DISCONTINUED | OUTPATIENT
Start: 2023-07-13 | End: 2023-07-13 | Stop reason: HOSPADM

## 2023-07-13 RX ORDER — ONDANSETRON 4 MG/1
4 TABLET, ORALLY DISINTEGRATING ORAL EVERY 30 MIN PRN
Status: DISCONTINUED | OUTPATIENT
Start: 2023-07-13 | End: 2023-07-13 | Stop reason: HOSPADM

## 2023-07-13 RX ORDER — KETOROLAC TROMETHAMINE 30 MG/ML
15 INJECTION, SOLUTION INTRAMUSCULAR; INTRAVENOUS
Status: DISCONTINUED | OUTPATIENT
Start: 2023-07-13 | End: 2023-07-13 | Stop reason: HOSPADM

## 2023-07-13 RX ORDER — ONDANSETRON 2 MG/ML
4 INJECTION INTRAMUSCULAR; INTRAVENOUS EVERY 30 MIN PRN
Status: DISCONTINUED | OUTPATIENT
Start: 2023-07-13 | End: 2023-07-13 | Stop reason: HOSPADM

## 2023-07-13 RX ORDER — SODIUM CHLORIDE, SODIUM LACTATE, POTASSIUM CHLORIDE, CALCIUM CHLORIDE 600; 310; 30; 20 MG/100ML; MG/100ML; MG/100ML; MG/100ML
INJECTION, SOLUTION INTRAVENOUS CONTINUOUS
Status: DISCONTINUED | OUTPATIENT
Start: 2023-07-13 | End: 2023-07-13 | Stop reason: HOSPADM

## 2023-07-13 RX ORDER — OXYCODONE HYDROCHLORIDE 10 MG/1
10 TABLET ORAL
Status: DISCONTINUED | OUTPATIENT
Start: 2023-07-13 | End: 2023-07-13 | Stop reason: HOSPADM

## 2023-07-13 RX ORDER — HEPARIN SODIUM 5000 [USP'U]/.5ML
5000 INJECTION, SOLUTION INTRAVENOUS; SUBCUTANEOUS
Status: COMPLETED | OUTPATIENT
Start: 2023-07-13 | End: 2023-07-13

## 2023-07-13 RX ORDER — FENTANYL CITRATE 50 UG/ML
INJECTION, SOLUTION INTRAMUSCULAR; INTRAVENOUS PRN
Status: DISCONTINUED | OUTPATIENT
Start: 2023-07-13 | End: 2023-07-13

## 2023-07-13 RX ORDER — NALOXONE HYDROCHLORIDE 0.4 MG/ML
0.2 INJECTION, SOLUTION INTRAMUSCULAR; INTRAVENOUS; SUBCUTANEOUS
Status: DISCONTINUED | OUTPATIENT
Start: 2023-07-13 | End: 2023-07-13 | Stop reason: HOSPADM

## 2023-07-13 RX ORDER — SODIUM CHLORIDE, SODIUM LACTATE, POTASSIUM CHLORIDE, CALCIUM CHLORIDE 600; 310; 30; 20 MG/100ML; MG/100ML; MG/100ML; MG/100ML
INJECTION, SOLUTION INTRAVENOUS CONTINUOUS PRN
Status: DISCONTINUED | OUTPATIENT
Start: 2023-07-13 | End: 2023-07-13

## 2023-07-13 RX ORDER — FENTANYL CITRATE 50 UG/ML
50 INJECTION, SOLUTION INTRAMUSCULAR; INTRAVENOUS EVERY 5 MIN PRN
Status: DISCONTINUED | OUTPATIENT
Start: 2023-07-13 | End: 2023-07-13 | Stop reason: HOSPADM

## 2023-07-13 RX ORDER — FENTANYL CITRATE 50 UG/ML
25-50 INJECTION, SOLUTION INTRAMUSCULAR; INTRAVENOUS
Status: DISCONTINUED | OUTPATIENT
Start: 2023-07-13 | End: 2023-07-13 | Stop reason: HOSPADM

## 2023-07-13 RX ORDER — LIDOCAINE HYDROCHLORIDE 20 MG/ML
INJECTION, SOLUTION INFILTRATION; PERINEURAL PRN
Status: DISCONTINUED | OUTPATIENT
Start: 2023-07-13 | End: 2023-07-13

## 2023-07-13 RX ORDER — FENTANYL CITRATE 50 UG/ML
25 INJECTION, SOLUTION INTRAMUSCULAR; INTRAVENOUS EVERY 5 MIN PRN
Status: DISCONTINUED | OUTPATIENT
Start: 2023-07-13 | End: 2023-07-13 | Stop reason: HOSPADM

## 2023-07-13 RX ORDER — HYDROMORPHONE HCL IN WATER/PF 6 MG/30 ML
0.2 PATIENT CONTROLLED ANALGESIA SYRINGE INTRAVENOUS EVERY 5 MIN PRN
Status: DISCONTINUED | OUTPATIENT
Start: 2023-07-13 | End: 2023-07-13 | Stop reason: HOSPADM

## 2023-07-13 RX ORDER — CEFOXITIN 2 G/1
2 INJECTION, POWDER, FOR SOLUTION INTRAVENOUS
Status: COMPLETED | OUTPATIENT
Start: 2023-07-13 | End: 2023-07-13

## 2023-07-13 RX ORDER — OXYCODONE HYDROCHLORIDE 5 MG/1
5-10 TABLET ORAL EVERY 6 HOURS PRN
Qty: 6 TABLET | Refills: 0 | Status: SHIPPED | OUTPATIENT
Start: 2023-07-13 | End: 2023-08-10

## 2023-07-13 RX ORDER — DEXAMETHASONE SODIUM PHOSPHATE 4 MG/ML
INJECTION, SOLUTION INTRA-ARTICULAR; INTRALESIONAL; INTRAMUSCULAR; INTRAVENOUS; SOFT TISSUE PRN
Status: DISCONTINUED | OUTPATIENT
Start: 2023-07-13 | End: 2023-07-13

## 2023-07-13 RX ORDER — ONDANSETRON 2 MG/ML
INJECTION INTRAMUSCULAR; INTRAVENOUS PRN
Status: DISCONTINUED | OUTPATIENT
Start: 2023-07-13 | End: 2023-07-13

## 2023-07-13 RX ORDER — HYDROMORPHONE HCL IN WATER/PF 6 MG/30 ML
0.4 PATIENT CONTROLLED ANALGESIA SYRINGE INTRAVENOUS EVERY 5 MIN PRN
Status: DISCONTINUED | OUTPATIENT
Start: 2023-07-13 | End: 2023-07-13 | Stop reason: HOSPADM

## 2023-07-13 RX ORDER — PROPOFOL 10 MG/ML
INJECTION, EMULSION INTRAVENOUS PRN
Status: DISCONTINUED | OUTPATIENT
Start: 2023-07-13 | End: 2023-07-13

## 2023-07-13 RX ORDER — PROPOFOL 10 MG/ML
INJECTION, EMULSION INTRAVENOUS CONTINUOUS PRN
Status: DISCONTINUED | OUTPATIENT
Start: 2023-07-13 | End: 2023-07-13

## 2023-07-13 RX ORDER — BUPIVACAINE HYDROCHLORIDE 2.5 MG/ML
INJECTION, SOLUTION EPIDURAL; INFILTRATION; INTRACAUDAL
Status: COMPLETED | OUTPATIENT
Start: 2023-07-13 | End: 2023-07-13

## 2023-07-13 RX ORDER — CEFOXITIN 1 G/1
1 INJECTION, POWDER, FOR SOLUTION INTRAVENOUS
Status: DISCONTINUED | OUTPATIENT
Start: 2023-07-13 | End: 2023-07-13

## 2023-07-13 RX ORDER — FLUMAZENIL 0.1 MG/ML
0.2 INJECTION, SOLUTION INTRAVENOUS
Status: DISCONTINUED | OUTPATIENT
Start: 2023-07-13 | End: 2023-07-13 | Stop reason: HOSPADM

## 2023-07-13 RX ADMIN — PHENYLEPHRINE HYDROCHLORIDE 200 MCG: 10 INJECTION INTRAVENOUS at 12:24

## 2023-07-13 RX ADMIN — SODIUM CHLORIDE, POTASSIUM CHLORIDE, SODIUM LACTATE AND CALCIUM CHLORIDE: 600; 310; 30; 20 INJECTION, SOLUTION INTRAVENOUS at 12:42

## 2023-07-13 RX ADMIN — SODIUM CHLORIDE, POTASSIUM CHLORIDE, SODIUM LACTATE AND CALCIUM CHLORIDE: 600; 310; 30; 20 INJECTION, SOLUTION INTRAVENOUS at 11:19

## 2023-07-13 RX ADMIN — BUPIVACAINE HYDROCHLORIDE 20 ML: 2.5 INJECTION, SOLUTION EPIDURAL; INFILTRATION; INTRACAUDAL; PERINEURAL at 10:55

## 2023-07-13 RX ADMIN — NOREPINEPHRINE BITARTRATE 6.4 MCG: 1 INJECTION, SOLUTION, CONCENTRATE INTRAVENOUS at 12:30

## 2023-07-13 RX ADMIN — ONDANSETRON 4 MG: 2 INJECTION INTRAMUSCULAR; INTRAVENOUS at 12:44

## 2023-07-13 RX ADMIN — PROPOFOL 150 MCG/KG/MIN: 10 INJECTION, EMULSION INTRAVENOUS at 11:26

## 2023-07-13 RX ADMIN — PHENYLEPHRINE HYDROCHLORIDE 150 MCG: 10 INJECTION INTRAVENOUS at 11:43

## 2023-07-13 RX ADMIN — PROPOFOL 110 MG: 10 INJECTION, EMULSION INTRAVENOUS at 11:22

## 2023-07-13 RX ADMIN — NOREPINEPHRINE BITARTRATE 6.4 MCG: 1 INJECTION, SOLUTION, CONCENTRATE INTRAVENOUS at 12:54

## 2023-07-13 RX ADMIN — MIDAZOLAM 1 MG: 1 INJECTION INTRAMUSCULAR; INTRAVENOUS at 11:14

## 2023-07-13 RX ADMIN — NOREPINEPHRINE BITARTRATE 6.4 MCG: 1 INJECTION, SOLUTION, CONCENTRATE INTRAVENOUS at 12:35

## 2023-07-13 RX ADMIN — HEPARIN SODIUM 5000 UNITS: 5000 INJECTION, SOLUTION INTRAVENOUS; SUBCUTANEOUS at 11:04

## 2023-07-13 RX ADMIN — FENTANYL CITRATE 50 MCG: 50 INJECTION, SOLUTION INTRAMUSCULAR; INTRAVENOUS at 10:58

## 2023-07-13 RX ADMIN — NOREPINEPHRINE BITARTRATE 6.4 MCG: 1 INJECTION, SOLUTION, CONCENTRATE INTRAVENOUS at 12:58

## 2023-07-13 RX ADMIN — PHENYLEPHRINE HYDROCHLORIDE 100 MCG: 10 INJECTION INTRAVENOUS at 12:17

## 2023-07-13 RX ADMIN — Medication 50 MG: at 11:23

## 2023-07-13 RX ADMIN — LIDOCAINE HYDROCHLORIDE 80 MG: 20 INJECTION, SOLUTION INFILTRATION; PERINEURAL at 11:22

## 2023-07-13 RX ADMIN — MIDAZOLAM 1 MG: 1 INJECTION INTRAMUSCULAR; INTRAVENOUS at 11:43

## 2023-07-13 RX ADMIN — PHENYLEPHRINE HYDROCHLORIDE 200 MCG: 10 INJECTION INTRAVENOUS at 11:54

## 2023-07-13 RX ADMIN — FENTANYL CITRATE 50 MCG: 50 INJECTION, SOLUTION INTRAMUSCULAR; INTRAVENOUS at 12:05

## 2023-07-13 RX ADMIN — PROPOFOL 85 MCG/KG/MIN: 10 INJECTION, EMULSION INTRAVENOUS at 12:34

## 2023-07-13 RX ADMIN — BUPIVACAINE 20 ML: 13.3 INJECTION, SUSPENSION, LIPOSOMAL INFILTRATION at 10:55

## 2023-07-13 RX ADMIN — CEFOXITIN SODIUM 2 G: 2 POWDER, FOR SOLUTION INTRAVENOUS at 11:26

## 2023-07-13 RX ADMIN — FENTANYL CITRATE 50 MCG: 50 INJECTION, SOLUTION INTRAMUSCULAR; INTRAVENOUS at 12:10

## 2023-07-13 RX ADMIN — PROPOFOL 30 MG: 10 INJECTION, EMULSION INTRAVENOUS at 12:43

## 2023-07-13 RX ADMIN — NOREPINEPHRINE BITARTRATE 6.4 MCG: 1 INJECTION, SOLUTION, CONCENTRATE INTRAVENOUS at 12:39

## 2023-07-13 RX ADMIN — SUGAMMADEX 200 MG: 100 INJECTION, SOLUTION INTRAVENOUS at 12:59

## 2023-07-13 RX ADMIN — FENTANYL CITRATE 25 MCG: 50 INJECTION, SOLUTION INTRAMUSCULAR; INTRAVENOUS at 12:49

## 2023-07-13 RX ADMIN — DEXAMETHASONE SODIUM PHOSPHATE 10 MG: 4 INJECTION, SOLUTION INTRA-ARTICULAR; INTRALESIONAL; INTRAMUSCULAR; INTRAVENOUS; SOFT TISSUE at 11:23

## 2023-07-13 RX ADMIN — PROPOFOL 10 MG: 10 INJECTION, EMULSION INTRAVENOUS at 12:58

## 2023-07-13 ASSESSMENT — ACTIVITIES OF DAILY LIVING (ADL)
ADLS_ACUITY_SCORE: 20

## 2023-07-13 ASSESSMENT — ENCOUNTER SYMPTOMS: SEIZURES: 0

## 2023-07-13 ASSESSMENT — LIFESTYLE VARIABLES: TOBACCO_USE: 0

## 2023-07-13 NOTE — ANESTHESIA PROCEDURE NOTES
TAP Procedure Note    Pre-Procedure   Staff -        Anesthesiologist:  Samira Carter MD       Resident/Fellow: Domi James MD       Other Anesthesia Staff: Mohamud Randall MD       Performed By: resident and with residents       Procedure performed by resident/fellow/CRNA in presence of a teaching physician.         Location: pre-op       Procedure Start/Stop Times: 7/13/2023 10:55 AM and 7/13/2023 11:08 AM       Pre-Anesthestic Checklist: patient identified, IV checked, site marked, risks and benefits discussed, informed consent, monitors and equipment checked, pre-op evaluation, at physician/surgeon's request and post-op pain management  Timeout:       Correct Patient: Yes        Correct Procedure: Yes        Correct Site: Yes        Correct Position: Yes        Correct Laterality: Yes        Site Marked: Yes  Procedure Documentation  Procedure: TAP       Diagnosis: POST OPERATIVE PAIN       Laterality: bilateral       Patient Position: supine       Patient Prep/Sterile Barriers: sterile gloves, mask       Skin prep: Chloraprep       Needle Type: short bevel       Needle Gauge: 21.        Needle Length (millimeters): 110        Ultrasound guided       1. Ultrasound was used to identify targeted nerve, plexus, vascular marker, or fascial plane and place a needle adjacent to it in real-time.       2. Ultrasound was used to visualize the spread of anesthetic in close proximity to the above referenced structure.       3. A permanent image is entered into the patient's record.    Assessment/Narrative         The placement was negative for: blood aspirated, painful injection and site bleeding       Paresthesias: No.       Bolus given via needle..        Secured via.        Insertion/Infusion Method: Single Shot       Complications: none       Injection made incrementally with aspirations every 5 mL.    Medication(s) Administered   Bupivacaine 0.25% PF (Infiltration) - Infiltration   20 mL - 7/13/2023 10:55:00  "AM  Bupivacaine liposome (Exparel) 1.3% LA inj susp (Infiltration) - Infiltration   20 mL - 7/13/2023 10:55:00 AM  Medication Administration Time: 7/13/2023 10:55 AM     Comments:  Discussed risks of nerve block, including nerve injury, bleeding, infection. Discussed anticipated incomplete analgesia. Discussed alternative of not performing a nerve block. Ensured understanding, invited questions and all questions were answered. Patient wishes to proceed.    Informed consent was obtained.   Patient tolerated well. Incremental aspiration every 5 mL. No paresthesia, no heme. Needle tip visualized throughout with appropriate spread of local anesthetic in fascial planes bilaterally.  Block was placed at the surgeon's request for post operative pain control.          FOR [facility] ONLY:   Pain Team Contact information: please page the Pain Team Via [employer]. Search \"Pain\". During daytime hours, please page the attending first. At night please page the resident first.      "

## 2023-07-13 NOTE — ANESTHESIA CARE TRANSFER NOTE
Patient: Frandy Workman    Procedure: Procedure(s):  Laparoscopic lysis of adhesions, laparoscopic resection of abdominal mass       Diagnosis: Status post liver transplantation (H) [Z94.4]  HCC (hepatocellular carcinoma) (H) [C22.0]  Abnormal CT of the abdomen [R93.5]  Diagnosis Additional Information: No value filed.    Anesthesia Type:   No value filed.     Note:    Oropharynx: oropharynx clear of all foreign objects and spontaneously breathing  Level of Consciousness: awake  Oxygen Supplementation: nasal cannula  Level of Supplemental Oxygen (L/min / FiO2): 3  Independent Airway: airway patency satisfactory and stable  Dentition: dentition unchanged  Vital Signs Stable: post-procedure vital signs reviewed and stable  Report to RN Given: handoff report given  Patient transferred to: PACU    Handoff Report: Identifed the Patient, Identified the Reponsible Provider, Reviewed the pertinent medical history, Discussed the surgical course, Reviewed Intra-OP anesthesia mangement and issues during anesthesia, Set expectations for post-procedure period and Allowed opportunity for questions and acknowledgement of understanding      Vitals:  Vitals Value Taken Time   /68 07/13/23 1315   Temp     Pulse 84 07/13/23 1317   Resp 14 07/13/23 1317   SpO2 100 % 07/13/23 1317   Vitals shown include unvalidated device data.    Electronically Signed By: SHANIQUE Garcia CRNA  July 13, 2023  1:18 PM

## 2023-07-13 NOTE — ANESTHESIA POSTPROCEDURE EVALUATION
Patient: Frandy Workman    Procedure: Procedure(s):  Laparoscopic lysis of adhesions, laparoscopic resection of abdominal mass       Anesthesia Type:  General    Note:  Disposition: Admission   Postop Pain Control: Uneventful            Sign Out: Well controlled pain   PONV: No   Neuro/Psych: Uneventful            Sign Out: Acceptable/Baseline neuro status   Airway/Respiratory: Uneventful            Sign Out: Acceptable/Baseline resp. status   CV/Hemodynamics: Uneventful            Sign Out: Acceptable CV status; No obvious hypovolemia; No obvious fluid overload   Other NRE: NONE   DID A NON-ROUTINE EVENT OCCUR? No           Last vitals:  Vitals Value Taken Time   /65 07/13/23 1345   Temp 36.8  C (98.2  F) 07/13/23 1345   Pulse 82 07/13/23 1354   Resp 14 07/13/23 1354   SpO2 99 % 07/13/23 1356   Vitals shown include unvalidated device data.    Electronically Signed By: Ga Oconnor MD, MD  July 13, 2023  1:58 PM

## 2023-07-13 NOTE — BRIEF OP NOTE
[facility]    Brief Operative Note    Pre-operative diagnosis: Status post liver transplantation (H) [Z94.4]  HCC (hepatocellular carcinoma) (H) [C22.0]  Abnormal CT of the abdomen [R93.5]  Post-operative diagnosis Same as pre-operative diagnosis    Procedure: Procedure(s):  Laparoscopic lysis of adhesions, laparoscopic resection of abdominal mass  Surgeon: Surgeon(s) and Role:     * Ruiz Chu MD - Primary     * Shawanda Wilkins MD - Resident - Assisting  Anesthesia: General with Block   Estimated Blood Loss: Minimal    Drains: None  Specimens:   ID Type Source Tests Collected by Time Destination   1 : Right paracecal mass Tissue Peritoneum SURGICAL PATHOLOGY EXAM Ruiz Chu MD 7/13/2023 12:16 PM      Findings:   Peritoneal nodule in the RLQ, concerning for malignancy, sent to pathology. .  Complications: None.  Implants: * No implants in log *       Home today     Bess Wilkins   Surgery Resident

## 2023-07-13 NOTE — ANESTHESIA PROCEDURE NOTES
Airway       Patient location during procedure: OR       Procedure Start/Stop Times: 7/13/2023 11:26 AM  Staff -        Anesthesiologist:  Ga Oconnor MD       CRNA: Moni Andrew APRN CRNA       Performed By: CRNA  Consent for Airway        Urgency: elective  Indications and Patient Condition       Indications for airway management: mikaela-procedural       Induction type:intravenous       Mask difficulty assessment: 1 - vent by mask    Final Airway Details       Final airway type: endotracheal airway       Successful airway: ETT - single and Oral  Endotracheal Airway Details        ETT size (mm): 8.0       Cuffed: yes       Successful intubation technique: direct laryngoscopy       DL Blade Type: Cespedes 2       Grade View of Cords: 1       Adjucts: stylet       Position: Right       Measured from: lips       Secured at (cm): 24       Bite block used: None    Post intubation assessment        Placement verified by: capnometry and equal breath sounds        Number of attempts at approach: 1       Number of other approaches attempted: 0       Secured with: pink tape       Ease of procedure: easy       Dentition: Unchanged    Medication(s) Administered   Medication Administration Time: 7/13/2023 11:26 AM

## 2023-07-13 NOTE — DISCHARGE INSTRUCTIONS
[facility]  Same-Day Surgery   Adult Discharge Orders & Instructions     For 24 hours after surgery    Get plenty of rest.  A responsible adult must stay with you for at least 24 hours after you leave the hospital.   Do not drive or use heavy equipment.  If you have weakness or tingling, don't drive or use heavy equipment until this feeling goes away.  Do not drink alcohol.  Avoid strenuous or risky activities.  Ask for help when climbing stairs.   You may feel lightheaded.  IF so, sit for a few minutes before standing.  Have someone help you get up.   If you have nausea (feel sick to your stomach): Drink only clear liquids such as apple juice, ginger ale, broth or 7-Up.  Rest may also help.  Be sure to drink enough fluids.  Move to a regular diet as you feel able.  You may have a slight fever. Call the doctor if your fever is over 100 F (37.7 C) (taken under the tongue) or lasts longer than 24 hours.  You may have a dry mouth, a sore throat, muscle aches or trouble sleeping.  These should go away after 24 hours.  Do not make important or legal decisions.   Call your doctor for any of the followin.  Signs of infection (fever, growing tenderness at the surgery site, a large amount of drainage or bleeding, severe pain, foul-smelling drainage, redness, swelling).    2. It has been over 8 to 10 hours since surgery and you are still not able to urinate (pass water).    3.  Headache for over 24 hours.    4.  Numbness, tingling or weakness the day after surgery (if you had spinal anesthesia).  To contact a doctor, call Dr Chu's office at [phone] (clinic) or Maggie BYRNE --[phone]  or:    '   [phone] and ask for the resident on call for   surgery (answered 24 hours a day)  '   Emergency Department:    [facility]: [phone]       (TTY for hearing impaired: [phone])

## 2023-07-13 NOTE — ANESTHESIA PREPROCEDURE EVALUATION
Anesthesia Pre-Procedure Evaluation    Patient: Frandy Workman   MRN: [id number] : 1965        Procedure : Procedure(s):  PHACOEMULSIFICATION, COMPLEX CATARACT, WITH INTRAOCULAR LENS IMPLANT WITH TRYPAN RIGHT EYE          Past Medical History:   Diagnosis Date     Anemia      Arthritis      BPH (benign prostatic hyperplasia)      CAD (coronary artery disease) 2019     Cholelithiasis      Conductive hearing loss 2017     Depressive disorder 1986    Suffer effects throughout life     Gastroesophageal reflux disease 2014     HCC (hepatocellular carcinoma) (H) 2019     History of diabetic retinopathy 2018     HTN (hypertension) 2019     Hyperlipidemia      Liver cirrhosis secondary to ESTRADA (H)      Liver transplanted (H) 2019     Portal vein thrombosis 2019     Type II diabetes mellitus (H)       Past Surgical History:   Procedure Laterality Date     BYPASS GRAFT ARTERY CORONARY N/A 2021    Procedure: median sternotomy, on cardiopulmonary bypass, CORONARY ARTERY BYPASS GRAFT (CABG) x2 with left greater saphenous vein endoscopic harvest and left internal mammery artery harvest;  Surgeon: Tom Zapata MD;  Location: [facility]     COLONOSCOPY           COLONOSCOPY N/A 2019    Procedure: COLONOSCOPY, WITH POLYPECTOMY AND BIOPSY;  Surgeon: Adam Morton MD;  Location: [facility] GI     CV CENTRAL VENOUS CATHETER PLACEMENT N/A 2021    Procedure: Central Venous Catheter Placement;  Surgeon: Fermin Polanco MD;  Location:  [facility]     CV CORONARY ANGIOGRAM N/A 2021    Procedure: Coronary Angiogram;  Surgeon: Fermin Polanco MD;  Location:  [facility]     CV HEART CATHETERIZATION WITH POSSIBLE INTERVENTION N/A 2019    Procedure: CORS;  Surgeon: Jagdish Hoyt MD;  Location:  [facility]     CV INTRA AORTIC BALLOON N/A 2021    Procedure: Intra Aortic  Balloon Pump Insertion;  Surgeon: Fermin Polanco MD;  Location:  [facility]     ESOPHAGOSCOPY, GASTROSCOPY, DUODENOSCOPY (EGD), COMBINED N/A 11/17/2016    Procedure: COMBINED ESOPHAGOSCOPY, GASTROSCOPY, DUODENOSCOPY (EGD);  Surgeon: Sanit Rosas MD;  Location:  GI     ESOPHAGOSCOPY, GASTROSCOPY, DUODENOSCOPY (EGD), COMBINED N/A 11/17/2017    Procedure: COMBINED ESOPHAGOSCOPY, GASTROSCOPY, DUODENOSCOPY (EGD);  EGD;  Surgeon: Santi Rosas MD;  Location:  GI     ESOPHAGOSCOPY, GASTROSCOPY, DUODENOSCOPY (EGD), COMBINED N/A 12/28/2018    Procedure: EGD;  Surgeon: Santi Rosas MD;  Location:  OR     ESOPHAGOSCOPY, GASTROSCOPY, DUODENOSCOPY (EGD), COMBINED N/A 12/6/2019    Procedure: ESOPHAGOGASTRODUODENOSCOPY, WITH BIOPSY;  Surgeon: Adam Morton MD;  Location:  GI     ESOPHAGOSCOPY, GASTROSCOPY, DUODENOSCOPY (EGD), COMBINED N/A 2/13/2020    Procedure: ESOPHAGOGASTRODUODENOSCOPY (EGD);  Surgeon: Santi Rosas MD;  Location:  GI     HEAD & NECK SURGERY      12/2017 at [facility].      IMPLANT GOLD WEIGHT EYELID Right 11/16/2017    Procedure: IMPLANT WEIGHT EYELID;  Right Upper Eyelid Weight, right tarsal strip lower eyelid;  Surgeon: Milana Malave MD;  [facility]     IR CHEMO EMBOLIZATION  1/22/2019     KNEE SURGERY Left      ORTHOPEDIC SURGERY       PAROTIDECTOMY, RADICAL NECK DISSECTION Right 11/2/2017    Procedure: PAROTIDECTOMY, RADICAL NECK DISSECTION;  Right Superfacial Parotidectomy , Facial nerve repair. with [facility] facial nerve monitor.;  Surgeon: Asiya Morgan MD;  Location: [facility]     PHACOEMULSIFICATION CLEAR CORNEA WITH STANDARD INTRAOCULAR LENS IMPLANT Right 1/24/2023    Procedure: PHACOEMULSIFICATION, COMPLEX CATARACT, WITH INTRAOCULAR LENS IMPLANT WITH TRYPAN RIGHT EYE;  Surgeon: Enriqueta Martin MD;  Location: [facility]     PHACOEMULSIFICATION WITH STANDARD INTRAOCULAR LENS IMPLANT Left 1/3/2023     Procedure: PHACOEMULSIFICATION, COMPLEX CATARACT, WITH STANDARD INTRAOCULAR LENS IMPLANT INSERTION LEFT EYE;  Surgeon: Enriqueta Martin MD;  Location: [facility] OR     PICC INSERTION Left 2017    4fr SL BioFlo PICC, 44cm in the L basilic vein w/ tip in the low SVC     RETURN LIVER TRANSPLANT N/A 2019    Procedure: Exploratory laparotomy, hematoma evacuation, abdominal washout;  Surgeon: Александр Ramos MD;  Location: [facility]     TRANSPLANT LIVER RECIPIENT  DONOR N/A 2019    Procedure: TRANSPLANT, LIVER, RECIPIENT,  DONOR;  Surgeon: Александр Ramos MD;  Location: [facility] OR     VASCULAR SURGERY        Allergies   Allergen Reactions     Codeine Other (See Comments)     Cannot take due to liver  Cannot tolerate oral narcotics     Seasonal Allergies      Sneezing, coughing, runny and itchy eyes      Social History     Tobacco Use     Smoking status: Former     Packs/day: 6.00     Years: 30.00     Pack years: 180.00     Types: Cigars, Cigarettes     Start date: 2016     Quit date: 10/25/2017     Years since quittin.7     Passive exposure: Past     Smokeless tobacco: Former     Types: Chew     Quit date: 10/31/2017     Tobacco comments:     1 tin per week   Substance Use Topics     Alcohol use: No     Alcohol/week: 0.0 standard drinks of alcohol     Comment: quit 1996      Wt Readings from Last 1 Encounters:   23 92.4 kg (203 lb 11.3 oz)        Anesthesia Evaluation   Pt has had prior anesthetic.     No history of anesthetic complications       ROS/MED HX  ENT/Pulmonary:  - neg pulmonary ROS  (-) tobacco use   Neurologic:  - neg neurologic ROS  (-) no seizures and no CVA   Cardiovascular:     (+) Dyslipidemia hypertension--CAD -CABG-date: 2021. -Taking blood thinners Previous cardiac testing   Echo: Date: 3/15/22 Results:  Interpretation Summary  Left ventricular function is normal.The ejection fraction is > 65%. Abnormal  non-specific septal motion is  present.  Global right ventricular function is normal. The RV is not clearly visualized  but the size and function are probably normal. The RV FAC is 40%.  There are no significant valvular abnormalities.  The estimated PA systolic pressure is 29 mmHg.  IVC diameter <2.1 cm collapsing >50% with sniff suggests a normal RA pressure  of 3 mmHg.  This study was compared with the study from 01/11/2022. There is no  significant change in the biventricular size/function upon direct visual  comparison.  Stress Test: Date: Results:    ECG Reviewed: Date: 1/2022 Results:  Sinus tachycardia  Cath: Date: Results:      METS/Exercise Tolerance: 4 - Raking leaves, gardening Comment: Can walk a couple blocks and ascend a flight of stairs without ROONEY/ CP   Hematologic:    (-) history of blood clots and history of blood transfusion   Musculoskeletal:   (+) arthritis,     GI/Hepatic: Comment: HCC s/p liver transplant    (+) GERD, Asymptomatic on medication, liver disease,     Renal/Genitourinary:     (+) renal disease, type: CRI,     Endo:     (+) type II DM, Last HgA1c: 6.9, date: 12/14/22, Using insulin, Chronic steroid usage for Post Transplant Immunosuppression.     Psychiatric/Substance Use:     (+) psychiatric history depression     Infectious Disease:  - neg infectious disease ROS     Malignancy:       Other:            Physical Exam    Airway        Mallampati: II       Respiratory Devices and Support         Dental       (+) Minor Abnormalities - some fillings, tiny chips      Cardiovascular          Rhythm and rate: regular     Pulmonary           breath sounds clear to auscultation           OUTSIDE LABS:  CBC:   Lab Results   Component Value Date    WBC 7.6 06/14/2023    WBC 5.6 03/15/2023    HGB 12.3 (L) 06/14/2023    HGB 13.9 03/15/2023    HCT 38.1 (L) 06/14/2023    HCT 43.2 03/15/2023     06/14/2023     03/15/2023     BMP:   Lab Results   Component Value Date     06/14/2023     03/30/2023     POTASSIUM 5.0 06/14/2023    POTASSIUM 4.5 03/30/2023    CHLORIDE 106 06/14/2023    CHLORIDE 103 03/30/2023    CO2 23 06/14/2023    CO2 26 03/30/2023    BUN 34.0 (H) 06/14/2023    BUN 29.1 (H) 03/30/2023    CR 2.0 (H) 06/14/2023    CR 1.88 (H) 06/14/2023     (H) 07/13/2023     (H) 06/14/2023     COAGS:   Lab Results   Component Value Date    PTT 46 (H) 07/14/2021    INR 1.13 01/11/2022    FIBR 372 07/14/2021     POC:   Lab Results   Component Value Date     (H) 06/26/2020     HEPATIC:   Lab Results   Component Value Date    ALBUMIN 4.6 06/14/2023    PROTTOTAL 7.2 06/14/2023    ALT 28 06/14/2023    AST 21 06/14/2023    ALKPHOS 93 06/14/2023    BILITOTAL 0.4 06/14/2023    JAMARI 31 11/15/2019     OTHER:   Lab Results   Component Value Date    PH 7.37 01/10/2022    LACT 0.5 (L) 01/11/2022    A1C 6.3 (H) 06/14/2023    DEBORAH 9.7 06/14/2023    PHOS 3.4 03/30/2023    MAG 2.3 01/10/2022    LIPASE 115 01/11/2022    AMYLASE 26 (L) 11/12/2019    TSH 1.24 04/25/2022    T4 1.21 08/08/2018    CRP 44.0 (H) 01/11/2022       Anesthesia Plan    ASA Status:  3   NPO Status:  NPO Appropriate    Anesthesia Type: General.     - Airway: ETT   Induction: Intravenous.   Maintenance: TIVA.        Consents    Anesthesia Plan(s) and associated risks, benefits, and realistic alternatives discussed. Questions answered and patient/representative(s) expressed understanding.     - Discussed: Risks, Benefits and Alternatives for BOTH SEDATION and the PROCEDURE were discussed     - Discussed with:  Patient         Postoperative Care    Pain management: Multi-modal analgesia.   PONV prophylaxis: Ondansetron (or other 5HT-3)     Comments:                    Ga Oconnor MD, MD

## 2023-07-14 ENCOUNTER — PATIENT OUTREACH (OUTPATIENT)
Dept: ONCOLOGY | Facility: CLINIC | Age: 58
End: 2023-07-14
Payer: MEDICARE

## 2023-07-14 ENCOUNTER — TELEPHONE (OUTPATIENT)
Dept: GASTROENTEROLOGY | Facility: CLINIC | Age: 58
End: 2023-07-14
Payer: MEDICARE

## 2023-07-14 NOTE — TELEPHONE ENCOUNTER
Called patient to review pathology results from diagnostic laparoscopy performed yesterday.    Patient is well and denies any major issues- no pain or fever- since his surgery.    Cecal mass specimen consistent with recurrent HCC.    Medical oncology will be contacting patient to set up follow-up visit to discuss systemic chemotherapy.    Santi Banuelos MD  Hepatology

## 2023-07-14 NOTE — PROGRESS NOTES
Post Op Discharge Call    Surgery: Diagnostic laparoscopy     Surgery date: 7/13/23     Discharge Date:  7/13/23    Immediate Concerns: None at this time.     Pain:  No concerns with pain management.   Discussed using medication only as needed. Not scheduled.   Discussed use of ice and heat, recommended alternating between the two and using 20 mins on and 20 mins off.   Discussed use of Tylenol /Acetaminophen  (1000 mg every 8 hours)     Incision:   No concerns, healing well, no redness, drainage or edema reported.     Drains:   No drain.     Diet:   Regular diet     Bowels:   Passing gas.   Denies feelings of constipation and cramping.     Activity:   No difficulty with ADLs reported.   Patient is up independently at home.     Post op/follow up plans:   Post op appointment scheduled,confirmed date and time with patient.       Future Appointments   Date Time Provider Department Center   7/19/2023  2:30 PM Joseph Murillo [facility]   7/24/2023 11:30 AM Frannie Vasquez PA-C [facility]   7/26/2023 10:00 AM Ruiz Chu MD [facility]   8/2/2023  9:20 AM Enriqueta Martin MD [facility]   8/29/2023 10:00 AM Lamin Ortiz MD [facility]   9/14/2023  2:30 PM Lamin Gonzalez DPM [facility]   10/2/2023  9:00 AM Eloy Rao MD [facility]   10/4/2023  8:00 PM SLEEP STUDY  2 [facility]   10/19/2023 10:00 AM Anu Simpson MD [facility]   11/2/2023 11:00 AM Americo Cespedes MD [facility]   3/13/2024  9:00 AM [facility]   3/18/2024  8:30 AM Santi Rosas MD [facility]         Patient has our direct number for any questions or concerns that may arise.      Flaquita Soares RN, BSN  Care Coordinator - Surgical Oncology

## 2023-07-17 ASSESSMENT — ENCOUNTER SYMPTOMS
HEARTBURN: 0
ABDOMINAL PAIN: 0
WHEEZING: 1
HEMOPTYSIS: 0
COUGH: 1
SNORES LOUDLY: 1
RECTAL PAIN: 0
SHORTNESS OF BREATH: 1
SPUTUM PRODUCTION: 1
COUGH DISTURBING SLEEP: 1
BLOATING: 0
JAUNDICE: 0
NAUSEA: 0
DIARRHEA: 1
VOMITING: 0
BOWEL INCONTINENCE: 0
CONSTIPATION: 0
BLOOD IN STOOL: 0
POSTURAL DYSPNEA: 1
DYSPNEA ON EXERTION: 0

## 2023-07-17 NOTE — UTILIZATION REVIEW
Admission Status; Secondary Review Determination    No action to be taken. Please contact me on my Email : [email or web address] if you have any questions.    As part of the [facility] Utilization review plan, a self-audit is done on Medicare inpatient admission with less than 2 midnights stay. The 2014 IPPS Final Rule allows outpatient billing in the event that a hospital determines that an inpatient admission was not medically necessary under utilization review process.     (x) Outpatient status would be Appropriate- Short Stay- Post discharge review.    RATIONALE FOR DETERMINATION  This patient underwent Laparoscopic lysis of adhesions, laparoscopic resection of abdominal mass  CPT codes 87081 and 09527 are not on the CMS Inpatient Only List  Patient was admitted and discharge after one night stay. Record was sent by UR for a PA review. Based on the  severity of illness, intensity of service provided, expected LOS and risk for adverse outcome make the care appropriate for further outpatient/observation; however, doesn't meet criteria for hospital inpatient admission.       The information on this document is developed by the utilization review team in order for the business office to ensure compliance.  This only denotes the appropriateness of proper admission status and does not reflect the quality of care rendered.       The definitions of Inpatient Status and Observation Status used in making the determination above are those provided in the CMS Coverage Manual, Chapter 1 and Chapter 6, section 70.4.     Please cont me if you want to discuss further about this admission episode.      Jennifer Wren MD, FACP, JALEESA  Medical Director - Utilization management  Staff Hospitalist  [facility]    Pager: [phone]

## 2023-07-17 NOTE — PROGRESS NOTES
Virtual Visit Detail      Outcome for 23 11:48 AM: Data uploaded on Docin  Yojana Wang MA  Outcome for 23 3:10 PM: Data obtained via Docin website  Yojana Wang MA     Patient is showing 5/5 MNCM met.   Yojana Wang MA     Start time: 1139  End time: 1201  Provider location: off site- home  Patient location: off site- home.      HPI:     Frandy Workman is a 58 year old man here for follow up of type 2 diabetes complicated by nephropathy, retinopathy and neuropathy. 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 Unfortunately, he just found out he has new peritoneal metastatic disease.  He will be starting up therapy next week. He feels better than he has in years. 12,000 steps yesterday.   He has been having some lows and has had to cut back on novolog.     Appetite is normal.  He is eating less.      DM Regimen:  - Tresiba 36 units /day.  - Carbohydrate coverage to 1 unit:10 g of carbohydrate (5-10 units at a time).  - Correction Novolounit Novolo mg /dl >180    - Empagliflozin 10 mg daily (had JERONIMO/hypovolemias on higher dose).  - Metformin- started to get diarrhea from it. 500 mg daily.     Patient wears a camilo 2 sensor with an overall average of 176 mg/dL (CV 29%, TIR 60%, hypo 0%, severe hypo 0%) over the past 2 weeks.   Recent glucose is as follows:             Diabetes Control:   Lab Results   Component Value Date    A1C 6.0 01/10/2022    A1C 6.8 2021    A1C 6.0 2020    A1C 6.3 2020    A1C 6.6 2018    A1C 6.5 2017    A1C 7.8 10/25/2016       Past Medical History:   Diagnosis Date     Anemia      Arthritis      BPH (benign prostatic hyperplasia)      CAD (coronary artery disease) 2019     Cholelithiasis      Conductive hearing loss 2017     Depressive disorder 1986     Suffer effects throughout life     Gastroesophageal reflux disease 12/01/2014     HCC (hepatocellular carcinoma) (H) 01/22/2019     History of diabetic retinopathy 07/2018     HTN (hypertension) 11/20/2019     Hyperlipidemia      Liver cirrhosis secondary to ESTRADA (H)      Liver transplanted (H) 11/11/2019     Portal vein thrombosis 04/11/2019     Type II diabetes mellitus (H)        Past Surgical History:   Procedure Laterality Date     BYPASS GRAFT ARTERY CORONARY N/A 7/14/2021    Procedure: median sternotomy, on cardiopulmonary bypass, CORONARY ARTERY BYPASS GRAFT (CABG) x2 with left greater saphenous vein endoscopic harvest and left internal mammery artery harvest;  Surgeon: Tom Zapata MD;  [facility]     COLONOSCOPY      2015     COLONOSCOPY N/A 12/6/2019    Procedure: COLONOSCOPY, WITH POLYPECTOMY AND BIOPSY;  Surgeon: Adam Morton MD;  [facility]     CV CENTRAL VENOUS CATHETER PLACEMENT N/A 7/12/2021    Procedure: Central Venous Catheter Placement;  Surgeon: Fermin Polanco MD;  Location:  [facility]     CV CORONARY ANGIOGRAM N/A 7/12/2021    Procedure: Coronary Angiogram;  Surgeon: Fermin Polanco MD;  [facility]     CV HEART CATHETERIZATION WITH POSSIBLE INTERVENTION N/A 2/26/2019    Procedure: CORS;  Surgeon: Jagdish Hoyt MD;  [facility]     CV INTRA AORTIC BALLOON N/A 7/12/2021    Procedure: Intra Aortic Balloon Pump Insertion;  Surgeon: Fermin Polanco MD;  Location:  [facility]ATH [facility]     ESOPHAGOSCOPY, GASTROSCOPY, DUODENOSCOPY (EGD), COMBINED N/A 11/17/2016    Procedure: COMBINED ESOPHAGOSCOPY, GASTROSCOPY, DUODENOSCOPY (EGD);  Surgeon: Santi Rosas MD;  Location:  GI     ESOPHAGOSCOPY, GASTROSCOPY, DUODENOSCOPY (EGD), COMBINED N/A 11/17/2017    Procedure: COMBINED ESOPHAGOSCOPY, GASTROSCOPY, DUODENOSCOPY (EGD);  EGD;  Surgeon: Vin  Santi Hanna MD;  [facility]     ESOPHAGOSCOPY, GASTROSCOPY, DUODENOSCOPY (EGD), COMBINED N/A 12/28/2018    Procedure: EGD;  Surgeon: Santi Rosas MD;  [facility]     ESOPHAGOSCOPY, GASTROSCOPY, DUODENOSCOPY (EGD), COMBINED N/A 12/6/2019    Procedure: ESOPHAGOGASTRODUODENOSCOPY, WITH BIOPSY;  Surgeon: Adam Morton MD;  [facility]     ESOPHAGOSCOPY, GASTROSCOPY, DUODENOSCOPY (EGD), COMBINED N/A 2/13/2020    Procedure: ESOPHAGOGASTRODUODENOSCOPY (EGD);  Surgeon: Santi Rosas MD;  [facility]     EXCISE MASS ABDOMEN N/A 7/13/2023    Procedure: Laparoscopic lysis of adhesions, laparoscopic resection of abdominal mass;  Surgeon: Ruiz Chu MD;  [facility]     HEAD & NECK SURGERY      12/2017 at [facility].      IMPLANT GOLD WEIGHT EYELID Right 11/16/2017    Procedure: IMPLANT WEIGHT EYELID;  Right Upper Eyelid Weight, right tarsal strip lower eyelid;  Surgeon: Milana Malave MD;  [facility]     IR CHEMO EMBOLIZATION  1/22/2019     KNEE SURGERY Left      ORTHOPEDIC SURGERY       PAROTIDECTOMY, RADICAL NECK DISSECTION Right 11/2/2017    Procedure: PAROTIDECTOMY, RADICAL NECK DISSECTION;  Right Superfacial Parotidectomy , Facial nerve repair. with [facility] facial nerve monitor.;  Surgeon: Asiya Morgan MD;  [facility]     PHACOEMULSIFICATION CLEAR CORNEA WITH STANDARD INTRAOCULAR LENS IMPLANT Right 1/24/2023    Procedure: PHACOEMULSIFICATION, COMPLEX CATARACT, WITH INTRAOCULAR LENS IMPLANT WITH TRYPAN RIGHT EYE;  Surgeon: Enriqueta Martin MD;  [facility]     PHACOEMULSIFICATION WITH STANDARD INTRAOCULAR LENS IMPLANT Left 1/3/2023    Procedure: PHACOEMULSIFICATION, COMPLEX CATARACT, WITH STANDARD INTRAOCULAR LENS IMPLANT INSERTION LEFT EYE;  Surgeon: Enriqueta Martin MD;  [facility]     PICC INSERTION Left 11/06/2017    4fr SL BioFlo PICC, 44cm in the L basilic vein w/ tip in the low SVC     RETURN LIVER TRANSPLANT N/A  2019    Procedure: Exploratory laparotomy, hematoma evacuation, abdominal washout;  Surgeon: Александр Ramos MD;  [facility]     TRANSPLANT LIVER RECIPIENT  DONOR N/A 2019    Procedure: TRANSPLANT, LIVER, RECIPIENT,  DONOR;  Surgeon: Александр Ramos MD;  [facility]     VASCULAR SURGERY         Family History   Problem Relation Age of Onset     Skin Cancer Mother      Cancer Mother         mastectomy     Diabetes Mother      Cerebrovascular Disease Mother      Thyroid Disease Mother      Depression Mother      Colon Cancer Father 60     Pancreatic Cancer Father 60     Prostate Cancer Father      Macular Degeneration Father      Glaucoma Father      Skin Cancer Father      Thyroid Disease Sister      Depression Sister      Asthma Sister      Sleep Apnea Brother      Colorectal Cancer Maternal Grandmother      Cancer Maternal Grandmother      Substance Abuse Maternal Grandmother         Alcohol     Prostate Cancer Maternal Grandfather      Substance Abuse Maternal Grandfather         Alcohol     Colorectal Cancer Paternal Grandmother      Liver Disease No family hx of      Melanoma No family hx of      Anesthesia Reaction No family hx of      Deep Vein Thrombosis (DVT) No family hx of        Social History     Socioeconomic History     Marital status:      Highest education level: Bachelor's degree (e.g., BA, AB, BS)   Tobacco Use     Smoking status: Former     Packs/day: 6.00     Years: 30.00     Pack years: 180.00     Types: Cigars, Cigarettes     Start date: 2016     Quit date: 10/25/2017     Years since quittin.0     Smokeless tobacco: Former     Types: Chew     Quit date: 10/31/2017     Tobacco comments:     1 tin per week   Substance and Sexual Activity     Alcohol use: No     Alcohol/week: 0.0 standard drinks     Comment: quit 1996     Drug use: No     Sexual activity: Not Currently     Partners: Female     Birth control/protection: Condom   Other  Topics Concern     Parent/sibling w/ CABG, MI or angioplasty before 65F 55M? Yes   Social History Narrative    Prior , [place]     Social Determinants of Health     Intimate Partner Violence: Not At Risk     Fear of Current or Ex-Partner: No     Emotionally Abused: No     Physically Abused: No     Sexually Abused: No       Current Outpatient Medications   Medication     acetaminophen (TYLENOL) 325 MG tablet     Alcohol Swabs (ALCOHOL PREP) 70 % PADS     ammonium lactate (AMLACTIN) 12 % external cream     aspirin (SM ASPIRIN ADULT LOW STRENGTH) 81 MG EC tablet     BD VIKTORIA U/F 32G X 4 MM insulin pen needle     benzoyl peroxide 5 % external liquid     Continuous Blood Gluc Sensor (FREESTYLE CLAUDIA 2 SENSOR) MISC     empagliflozin (JARDIANCE) 10 MG TABS tablet     insulin aspart (NOVOLOG PEN) 100 UNIT/ML pen     insulin degludec (TRESIBA FLEXTOUCH) 100 UNIT/ML pen     ketoconazole (NIZORAL) 2 % external shampoo     lamiVUDine (EPIVIR) 100 MG tablet     lisinopril (ZESTRIL) 10 MG tablet     metFORMIN (GLUCOPHAGE XR) 500 MG 24 hr tablet     metoprolol succinate ER (TOPROL XL) 25 MG 24 hr tablet     Misc Natural Products (NEURIVA PO)     Multiple Vitamin (TAB-A-GLADIS) TABS     mycophenolate (GENERIC EQUIVALENT) 250 MG capsule     order for DME     oxyCODONE (ROXICODONE) 5 MG tablet     pantoprazole (PROTONIX) 40 MG EC tablet     predniSONE (DELTASONE) 5 MG tablet     pseudoePHEDrine (SUDAFED) 60 MG tablet     rosuvastatin (CRESTOR) 20 MG tablet     tamsulosin (FLOMAX) 0.4 MG capsule     Vitamin D3 50 mcg (2000 units) tablet     Current Facility-Administered Medications   Medication     aflibercept (EYLEA) injection prefilled syringe 2 mg     aflibercept (EYLEA) injection prefilled syringe 2 mg     dexamethasone (OZURDEX) intravitreal implant 0.7 mg     dexamethasone (OZURDEX) intravitreal implant 0.7 mg     faricimab-svoa (VABYSMO) intravitreal injection 6 mg     faricimab-svoa (VABYSMO) intravitreal injection 6  mg     lidocaine (PF) (XYLOCAINE) injection 1 mL          Allergies   Allergen Reactions     Codeine Other (See Comments)     Cannot take due to liver  Cannot tolerate oral narcotics     Seasonal Allergies      Sneezing, coughing, runny and itchy eyes       Physical Exam  There were no vitals taken for this visit.  GENERAL: healthy, alert and no distress  RESP: no audible wheeze, cough, or visible cyanosis.  No visible retractions or increased work of breathing.  Able to speak fully in complete sentences.  PSYCH: mentation appears normal, affect normal/bright, judgement and insight intact, normal speech and appearance well-groomed    RESULTS  Lab Results   Component Value Date    A1C 6.3 (H) 06/14/2023    A1C 6.9 (H) 12/14/2022    A1C 6.0 (H) 01/10/2022    A1C 6.8 (H) 07/13/2021    A1C 6.0 (H) 12/30/2020    A1C 6.3 (H) 06/13/2020    A1C 6.6 (H) 08/08/2018    A1C 6.5 (H) 06/09/2017    A1C 7.8 (H) 10/25/2016    HEMOGLOBINA1 4.8 03/04/2020    HEMOGLOBINA1 5.1 12/02/2019    HEMOGLOBINA1 5.4 08/14/2019    HEMOGLOBINA1 6.1 (A) 02/11/2019    HEMOGLOBINA1 8.1 (A) 04/11/2018       TSH   Date Value Ref Range Status   04/25/2022 1.24 0.40 - 4.00 mU/L Final   10/04/2021 1.90 0.40 - 4.00 mU/L Final   01/28/2021 0.91 0.40 - 4.00 mU/L Final   01/24/2020 1.91 0.40 - 4.00 mU/L Final   08/08/2018 0.49 0.40 - 4.00 mU/L Final   02/08/2018 0.71 0.40 - 4.00 mU/L Final   06/09/2017 0.42 0.40 - 4.00 mU/L Final     T4 Free   Date Value Ref Range Status   08/08/2018 1.21 0.76 - 1.46 ng/dL Final       ALT   Date Value Ref Range Status   06/14/2023 28 0 - 70 U/L Final     Comment:     Reference intervals for this test were updated on 6/12/2023 to more accurately reflect our healthy population. There may be differences in the flagging of prior results with similar values performed with this method. Interpretation of those prior results can be made in the context of the updated reference intervals.     03/15/2023 23 10 - 50 U/L Final    06/28/2021 20 0 - 70 U/L Final   06/14/2021 21 0 - 70 U/L Final   ]    Recent Labs   Lab Test 06/14/23  0924 12/14/22  0847 09/07/21  1025   CHOL 194 220* 176   HDL 37* 40 34*   LDL 78  --  95   TRIG 394* 498* 233*       Lab Results   Component Value Date     06/14/2023     06/28/2021      Lab Results   Component Value Date    POTASSIUM 5.0 06/14/2023    POTASSIUM 4.7 07/20/2022    POTASSIUM 4.6 06/28/2021     Lab Results   Component Value Date    CHLORIDE 106 06/14/2023    CHLORIDE 105 07/20/2022    CHLORIDE 107 06/28/2021     Lab Results   Component Value Date    DEBORAH 9.7 06/14/2023    DEBORAH 9.1 06/28/2021     Lab Results   Component Value Date    CO2 23 06/14/2023    CO2 26 07/20/2022    CO2 25 06/28/2021     Lab Results   Component Value Date    BUN 34.0 06/14/2023    BUN 32 07/20/2022    BUN 33 06/28/2021     Lab Results   Component Value Date    CR 2.0 06/14/2023    CR 1.88 06/14/2023    CR 1.62 06/28/2021       GFR Estimate   Date Value Ref Range Status   06/14/2023 41 (L) >60 mL/min/1.73m2 Final     Comment:     eGFR calculated using 2021 CKD-EPI equation.   03/30/2023 43 (L) >60 mL/min/1.73m2 Final     Comment:     eGFR calculated using 2021 CKD-EPI equation.   03/15/2023 34 (L) >60 mL/min/1.73m2 Final     Comment:     eGFR calculated using 2021 CKD-EPI equation.   06/28/2021 46 (L) >60 mL/min/[1.73_m2] Final     Comment:     Non  GFR Calc  Starting 12/18/2018, serum creatinine based estimated GFR (eGFR) will be   calculated using the Chronic Kidney Disease Epidemiology Collaboration   (CKD-EPI) equation.     06/14/2021 49 (L) >60 mL/min/[1.73_m2] Final     Comment:     Non  GFR Calc  Starting 12/18/2018, serum creatinine based estimated GFR (eGFR) will be   calculated using the Chronic Kidney Disease Epidemiology Collaboration   (CKD-EPI) equation.     06/04/2021 46 (L) >60 mL/min/[1.73_m2] Final     Comment:     Non  GFR Calc  Starting 12/18/2018,  serum creatinine based estimated GFR (eGFR) will be   calculated using the Chronic Kidney Disease Epidemiology Collaboration   (CKD-EPI) equation.       GFR, ESTIMATED POCT   Date Value Ref Range Status   06/14/2023 38 (L) >60 mL/min/1.73m2 Final   12/14/2022 40 (L) >60 mL/min/1.73m2 Final   06/08/2022 46 (L) >60 mL/min/1.73m2 Final     GFR Estimate If Black   Date Value Ref Range Status   06/28/2021 54 (L) >60 mL/min/[1.73_m2] Final     Comment:      GFR Calc  Starting 12/18/2018, serum creatinine based estimated GFR (eGFR) will be   calculated using the Chronic Kidney Disease Epidemiology Collaboration   (CKD-EPI) equation.     06/14/2021 57 (L) >60 mL/min/[1.73_m2] Final     Comment:      GFR Calc  Starting 12/18/2018, serum creatinine based estimated GFR (eGFR) will be   calculated using the Chronic Kidney Disease Epidemiology Collaboration   (CKD-EPI) equation.     06/04/2021 53 (L) >60 mL/min/[1.73_m2] Final     Comment:      GFR Calc  Starting 12/18/2018, serum creatinine based estimated GFR (eGFR) will be   calculated using the Chronic Kidney Disease Epidemiology Collaboration   (CKD-EPI) equation.         Lab Results   Component Value Date    MICROL 401.0 03/15/2023    MICROL 47 04/20/2022    MICROL 563 02/26/2021     No results found for: MICROALBUMIN  No results found for: CPEPT, GADAB, ISCAB    Vitamin B12   Date Value Ref Range Status   01/11/2022 2,179 (H) 193 - 986 pg/mL Final   06/13/2020 682 193 - 986 pg/mL Final   02/04/2019 3,006 (H) 193 - 986 pg/mL Final       Most recent eye exam date: : Not Found     133/74, pulse 89  135/76, pulse 91  137/71, 95  158/79, 97  138/75, 81    Assessment/Plan:     1.  Type 2 diabetes- Doing well.  Average glucose is down significantly, however he is having lows currently.  Will relax carb ratio today.  He will get in touch if he has any further steroids with his cancer treatment- if so, may need to add NPH insulin.    "We made the following plan today (instructions given to patient):   Continue the following treatment plan for your diabetes:     - Tresiba 36 units /day.  - Carbohydrate coverage to 1 unit:15 (was 10) g of carbohydrate   - Correction Novolounit Novolo mg /dl >180    - Empagliflozin 10 mg daily (had JERONIMO/hypovolemias on higher dose).  - Metformin- started to get diarrhea from it.  500 mg bid.     Stop jardiance and metformin during times of illness or dehydration.  Drink plenty of water.     Please let me know if you are having low blood sugars less than 70 or over 250 mg/dL.      If you have concerns, please send me a Fliqq message M-Th, call the clinic at [phone], or call [phone] after hours/weekends and ask to speak with the endocrinologist on call.      2.  Risk factors-     Retinopathy:  Yes.  Had eye exam within last year.   Nephropathy:  BP historically well controlled.+ albuminuria. On SGLT-2 inhibitor and increased lisinopril recently.  Creatinine stable. GFR 46.    Neuropathy: Yes.   Feet: OK, no ulcers. +neuropathy \"Constricting feeling.\" Sees podiatry. Wears diabetic shoe.   Lipids:  TG were high at last check. Goal LDL <70. Patient taking rosuvastatin 10 mg daily.      3.  F/U in 3 months, sooner with concerns.    26 minutes spent on the date of the encounter doing chart review, review of test results, review and interpretation of glucose data, patient visit and documentation, counseling/coordination of care, and discussion of follow up plan for worsening hyper and hypoglycemia.  The patient understood and is satisfied with today's visit.          Frannie Vasquez PA-C, MPAS   [facility]  Department of Medicine  Division of Endocrinology and Diabetes            "

## 2023-07-18 LAB
PATH REPORT.COMMENTS IMP SPEC: ABNORMAL
PATH REPORT.COMMENTS IMP SPEC: ABNORMAL
PATH REPORT.COMMENTS IMP SPEC: YES
PATH REPORT.FINAL DX SPEC: ABNORMAL
PATH REPORT.GROSS SPEC: ABNORMAL
PATH REPORT.INTRAOP OBS SPEC DOC: ABNORMAL
PATH REPORT.MICROSCOPIC SPEC OTHER STN: ABNORMAL
PATH REPORT.RELEVANT HX SPEC: ABNORMAL
PHOTO IMAGE: ABNORMAL

## 2023-07-19 ENCOUNTER — MYC MEDICAL ADVICE (OUTPATIENT)
Dept: FAMILY MEDICINE | Facility: CLINIC | Age: 58
End: 2023-07-19
Payer: MEDICARE

## 2023-07-19 ENCOUNTER — VIRTUAL VISIT (OUTPATIENT)
Dept: BEHAVIORAL HEALTH | Facility: CLINIC | Age: 58
End: 2023-07-19
Payer: MEDICARE

## 2023-07-19 DIAGNOSIS — F31.31 BIPOLAR 1 DISORDER, DEPRESSED, MILD (H): Primary | ICD-10-CM

## 2023-07-19 PROCEDURE — 90834 PSYTX W PT 45 MINUTES: CPT | Mod: 95 | Performed by: PSYCHOLOGIST

## 2023-07-20 ENCOUNTER — VIRTUAL VISIT (OUTPATIENT)
Dept: ONCOLOGY | Facility: CLINIC | Age: 58
End: 2023-07-20
Attending: INTERNAL MEDICINE
Payer: MEDICARE

## 2023-07-20 VITALS
WEIGHT: 198 LBS | HEIGHT: 69 IN | DIASTOLIC BLOOD PRESSURE: 68 MMHG | SYSTOLIC BLOOD PRESSURE: 118 MMHG | BODY MASS INDEX: 29.33 KG/M2

## 2023-07-20 DIAGNOSIS — Z94.4 STATUS POST LIVER TRANSPLANTATION (H): ICD-10-CM

## 2023-07-20 DIAGNOSIS — D84.9 IMMUNOSUPPRESSED STATUS (H): ICD-10-CM

## 2023-07-20 DIAGNOSIS — C78.6 MALIGNANT NEOPLASM METASTATIC TO PERITONEUM (H): ICD-10-CM

## 2023-07-20 DIAGNOSIS — C22.0 HCC (HEPATOCELLULAR CARCINOMA) (H): Primary | ICD-10-CM

## 2023-07-20 PROBLEM — K59.00 CONSTIPATION, UNSPECIFIED CONSTIPATION TYPE: Status: RESOLVED | Noted: 2021-04-01 | Resolved: 2023-07-20

## 2023-07-20 PROBLEM — E46 MALNUTRITION (H): Status: RESOLVED | Noted: 2019-12-06 | Resolved: 2023-07-20

## 2023-07-20 PROBLEM — E46 MALNUTRITION RELATED TO CHRONIC DISEASE (H): Status: RESOLVED | Noted: 2019-11-20 | Resolved: 2023-07-20

## 2023-07-20 PROBLEM — F41.9 ANXIETY: Status: RESOLVED | Noted: 2020-01-22 | Resolved: 2023-07-20

## 2023-07-20 PROBLEM — E83.39 HYPOPHOSPHATASIA: Status: RESOLVED | Noted: 2019-11-20 | Resolved: 2023-07-20

## 2023-07-20 PROBLEM — H02.231 PARALYTIC LAGOPHTHALMOS OF RIGHT UPPER EYELID: Status: RESOLVED | Noted: 2018-01-24 | Resolved: 2023-07-20

## 2023-07-20 PROBLEM — H25.12 AGE-RELATED NUCLEAR CATARACT OF LEFT EYE: Status: RESOLVED | Noted: 2022-12-08 | Resolved: 2023-07-20

## 2023-07-20 PROBLEM — H57.819 BROW PTOSIS: Status: RESOLVED | Noted: 2018-01-24 | Resolved: 2023-07-20

## 2023-07-20 PROBLEM — F33.0 MILD RECURRENT MAJOR DEPRESSION (H): Status: RESOLVED | Noted: 2020-01-22 | Resolved: 2023-07-20

## 2023-07-20 PROBLEM — F30.9 MANIA (H): Status: RESOLVED | Noted: 2020-06-12 | Resolved: 2023-07-20

## 2023-07-20 PROCEDURE — 99417 PROLNG OP E/M EACH 15 MIN: CPT | Mod: 95 | Performed by: INTERNAL MEDICINE

## 2023-07-20 PROCEDURE — 99215 OFFICE O/P EST HI 40 MIN: CPT | Mod: 95 | Performed by: INTERNAL MEDICINE

## 2023-07-20 ASSESSMENT — PAIN SCALES - GENERAL: PAINLEVEL: MODERATE PAIN (5)

## 2023-07-20 NOTE — LETTER
7/20/2023         RE: Frandy Workman  [address] [facility] [address]        Dear Colleague,    Thank you for referring your patient, Frandy Workman, to the [facility]. Please see a copy of my visit note below.    Virtual Visit Details    Type of service:  Video Visit   Video Start Time: 11:30  Video End Time:12:15    Originating Location (pt. Location): Home  Distant Location (provider location):  On-site  Platform used for Video Visit: Odette           I am seeing Miller Workman today in follow-up of hepatocellular carcinoma which is now recurred.    He is a 58-year-old gentleman with a past history of Bergeron related cirrhosis who developed hepatocellular carcinoma in 2018 for which he had TACE followed by liver transplantation in November 2019.  On routine surveillance last month he was noted to have new peritoneal masses and now has undergone laparoscopy confirming the presence of metastatic disease.  He tells me he is feeling generally well.  His small surgical wounds are bruised and tender, but not inflamed and he is managing with a little bit of Tylenol.  He is still doing his regular walking going 3 miles every morning and every evening.  His appetite and energy are stable.    On exam he appears well but older than his stated age.    I reviewed with him his pathology from a resected pericecal mass confirming the presence of poorly differentiated hepatocellular carcinoma.    I personally reviewed his most recent CT scan which is now about 5 weeks old which shows a small nodule at the hepatic flexure, and 1.6 cm mass abutting the cecum (presumably the mass that was removed) and no other clear evidence of metastatic disease.    His labs from last month show normal electrolytes with stable chronic renal failure with a creatinine of 1.9.  His bilirubin, albumin and liver enzymes at that time were normal.  His blood counts were normal.    Assessment/plan: Hepatocellular  carcinoma status post liver transplant now with new peritoneal metastatic disease.  He has very small volume of disease with presumably the only residual site being second small peritoneal nodule which could not be resected.  We will need a fresh baseline CT scan before we start active treatment.  The patient has a good performance status and primary comorbidities of his chronic renal failure and his liver transplant status with the attendant need for chronic immunosuppression.    I reviewed all these findings and explained that we had treatments that would be effective at controlling his cancer but would not be expected to get rid of it.  I explained that one of the common types of treatment for hepatocellular carcinoma, immunotherapy, would not be a good option for him as it carries a significant risk of rejecting his liver.  I discussed that the standard treatment in the setting would be with a tyrosine kinase inhibitor, either sorafenib or lenvatinib, and that these are generally reasonably well-tolerated but to come with some side effects.  I explained these are likely but not guaranteed to produce a response.  We would have an expectation of an average survival improvement of several months but there is great variability in activity in some patients will get no benefit and others may have a very long period of control.  I discussed that without active treatment survival times are usually measured in the range of a few months to a year or 2 at most.  I reviewed with him the expected toxicities including fatigue, mucositis and diarrhea, hand-foot toxicity, hypertension, and increased risk of bleeding and clotting complications.  I discussed the global plan with what ever we choose to treat him with of evaluating its effectiveness every 2 to 3 months and continuing so long as we had good control of his cancer without excess toxicity.  We discussed that there would be options for second and possibly other lines of  treatment when our initial treatment fails.    After this long discussion the patient expressed a good understanding and had all his questions answered and wished to proceed with the proposed course of treatment.  We will plan on starting with sorafenib at full doses and see him back about 2 weeks after starting to make any needed adjustments and then plan on a response assessment every 2 to 3 months thereafter.    Total time by me on the date of service inclusive of the above Long discussion, review of multiple imaging studies and labs and prior records, ordering, documentation, and coordination of care was greater than 75 minutes.    Again, thank you for allowing me to participate in the care of your patient.      Sincerely,      Miguel Villarreal MD

## 2023-07-20 NOTE — LETTER
7/20/2023         RE: Frandy Workman  [address] [facility] [address]      I am seeing Miller Workman today in follow-up of hepatocellular carcinoma which is now recurred.    He is a 58-year-old gentleman with a past history of Bergeron related cirrhosis who developed hepatocellular carcinoma in 2018 for which he had TACE followed by liver transplantation in November 2019.  On routine surveillance last month he was noted to have new peritoneal masses and now has undergone laparoscopy confirming the presence of metastatic disease.  He tells me he is feeling generally well.  His small surgical wounds are bruised and tender, but not inflamed and he is managing with a little bit of Tylenol.  He is still doing his regular walking going 3 miles every morning and every evening.  His appetite and energy are stable.    On exam he appears well but older than his stated age.    I reviewed with him his pathology from a resected pericecal mass confirming the presence of poorly differentiated hepatocellular carcinoma.    I personally reviewed his most recent CT scan which is now about 5 weeks old which shows a small nodule at the hepatic flexure, and 1.6 cm mass abutting the cecum (presumably the mass that was removed) and no other clear evidence of metastatic disease.    His labs from last month show normal electrolytes with stable chronic renal failure with a creatinine of 1.9.  His bilirubin, albumin and liver enzymes at that time were normal.  His blood counts were normal.    Assessment/plan: Hepatocellular carcinoma status post liver transplant now with new peritoneal metastatic disease.  He has very small volume of disease with presumably the only residual site being second small peritoneal nodule which could not be resected.  We will need a fresh baseline CT scan before we start active treatment.  The patient has a good performance status and primary comorbidities of his chronic renal failure and his liver transplant  status with the attendant need for chronic immunosuppression.    I reviewed all these findings and explained that we had treatments that would be effective at controlling his cancer but would not be expected to get rid of it.  I explained that one of the common types of treatment for hepatocellular carcinoma, immunotherapy, would not be a good option for him as it carries a significant risk of rejecting his liver.  I discussed that the standard treatment in the setting would be with a tyrosine kinase inhibitor, either sorafenib or lenvatinib, and that these are generally reasonably well-tolerated but to come with some side effects.  I explained these are likely but not guaranteed to produce a response.  We would have an expectation of an average survival improvement of several months but there is great variability in activity in some patients will get no benefit and others may have a very long period of control.  I discussed that without active treatment survival times are usually measured in the range of a few months to a year or 2 at most.  I reviewed with him the expected toxicities including fatigue, mucositis and diarrhea, hand-foot toxicity, hypertension, and increased risk of bleeding and clotting complications.  I discussed the global plan with what ever we choose to treat him with of evaluating its effectiveness every 2 to 3 months and continuing so long as we had good control of his cancer without excess toxicity.  We discussed that there would be options for second and possibly other lines of treatment when our initial treatment fails.    After this long discussion the patient expressed a good understanding and had all his questions answered and wished to proceed with the proposed course of treatment.  We will plan on starting with sorafenib at full doses and see him back about 2 weeks after starting to make any needed adjustments and then plan on a response assessment every 2 to 3 months  thereafter.      Total time by me on the date of service inclusive of the above Long discussion, review of multiple imaging studies and labs and prior records, ordering, documentation, and coordination of care was greater than 75 minutes.          Miguel Villarreal MD

## 2023-07-20 NOTE — NURSING NOTE
Is the patient currently in the state of [place]? YES    Visit mode:VIDEO    If the visit is dropped, the patient can be reconnected by: VIDEO VISIT: Send to e-mail at: [email or web address]    Will anyone else be joining the visit? NO      How would you like to obtain your AVS? MyChart    Are changes needed to the allergy or medication list? NO     Shani SALMON    Reason for visit: RECHECK

## 2023-07-20 NOTE — PROGRESS NOTES
Virtual Visit Details    Type of service:  Video Visit   Video Start Time: 11:30  Video End Time:12:15    Originating Location (pt. Location): Home  Distant Location (provider location):  On-site  Platform used for Video Visit: Odette           I am seeing Miller Workman today in follow-up of hepatocellular carcinoma which is now recurred.    He is a 58-year-old gentleman with a past history of Bergeron related cirrhosis who developed hepatocellular carcinoma in 2018 for which he had TACE followed by liver transplantation in November 2019.  On routine surveillance last month he was noted to have new peritoneal masses and now has undergone laparoscopy confirming the presence of metastatic disease.  He tells me he is feeling generally well.  His small surgical wounds are bruised and tender, but not inflamed and he is managing with a little bit of Tylenol.  He is still doing his regular walking going 3 miles every morning and every evening.  His appetite and energy are stable.    On exam he appears well but older than his stated age.    I reviewed with him his pathology from a resected pericecal mass confirming the presence of poorly differentiated hepatocellular carcinoma.    I personally reviewed his most recent CT scan which is now about 5 weeks old which shows a small nodule at the hepatic flexure, and 1.6 cm mass abutting the cecum (presumably the mass that was removed) and no other clear evidence of metastatic disease.    His labs from last month show normal electrolytes with stable chronic renal failure with a creatinine of 1.9.  His bilirubin, albumin and liver enzymes at that time were normal.  His blood counts were normal.    Assessment/plan: Hepatocellular carcinoma status post liver transplant now with new peritoneal metastatic disease.  He has very small volume of disease with presumably the only residual site being second small peritoneal nodule which could not be resected.  We will need a fresh baseline CT scan  before we start active treatment.  The patient has a good performance status and primary comorbidities of his chronic renal failure and his liver transplant status with the attendant need for chronic immunosuppression.    I reviewed all these findings and explained that we had treatments that would be effective at controlling his cancer but would not be expected to get rid of it.  I explained that one of the common types of treatment for hepatocellular carcinoma, immunotherapy, would not be a good option for him as it carries a significant risk of rejecting his liver.  I discussed that the standard treatment in the setting would be with a tyrosine kinase inhibitor, either sorafenib or lenvatinib, and that these are generally reasonably well-tolerated but to come with some side effects.  I explained these are likely but not guaranteed to produce a response.  We would have an expectation of an average survival improvement of several months but there is great variability in activity in some patients will get no benefit and others may have a very long period of control.  I discussed that without active treatment survival times are usually measured in the range of a few months to a year or 2 at most.  I reviewed with him the expected toxicities including fatigue, mucositis and diarrhea, hand-foot toxicity, hypertension, and increased risk of bleeding and clotting complications.  I discussed the global plan with what ever we choose to treat him with of evaluating its effectiveness every 2 to 3 months and continuing so long as we had good control of his cancer without excess toxicity.  We discussed that there would be options for second and possibly other lines of treatment when our initial treatment fails.    After this long discussion the patient expressed a good understanding and had all his questions answered and wished to proceed with the proposed course of treatment.  We will plan on starting with sorafenib at full  doses and see him back about 2 weeks after starting to make any needed adjustments and then plan on a response assessment every 2 to 3 months thereafter.      Total time by me on the date of service inclusive of the above Long discussion, review of multiple imaging studies and labs and prior records, ordering, documentation, and coordination of care was greater than 75 minutes.

## 2023-07-20 NOTE — PROGRESS NOTES
[facility]: Integrated Behavioral Health  July 19, 2023          Behavioral Health Clinician Progress Note    Patient Name: Frandy Workman           Service Type: Video visit      Service Location:  Video visit      Session Start Time: 2:30  Session End Time: 3:20      Session Length: 38 - 52      Attendees: Patient    Visit Activities (Refresh list every visit): [facility] Only     Service Modality:  Video Visit:      Provider verified identity through the following two step process.  Patient provided:  Patient photo and Patient is known previously to provider    Telemedicine Visit: The patient's condition can be safely assessed and treated via synchronous audio and visual telemedicine encounter.      Reason for Telemedicine Visit: Services only offered telehealth    Originating Site (Patient Location): Patient's home    Distant Site (Provider Location): Provider Remote Setting- Home Office    Consent:  The patient/guardian has verbally consented to: the potential risks and benefits of telemedicine (video visit) versus in person care; bill my insurance or make self-payment for services provided; and responsibility for payment of non-covered services.     Patient would like the video invitation sent by:  My Chart    Mode of Communication:  Video Conference via [facility]    Distant Location (Provider):  Off-site    As the provider I attest to compliance with applicable laws and regulations related to telemedicine.      Diagnostic Assessment Date:  12/03/2020  Treatment Plan Review Date:  7/18/2023  See Flowsheets for today's PHQ-9 and LIZZETTE-7 results  Previous PHQ-9:       5/8/2023     5:56 PM 5/17/2023     3:37 PM 6/28/2023     4:07 PM   PHQ-9 SCORE   PHQ-9 Total Score MyChart 6 (Mild depression)  3 (Minimal depression)   PHQ-9 Total Score 6 6 3     Previous LIZZETTE-7:       4/7/2021    12:34 PM 9/30/2021     1:45 PM 5/17/2023     3:37 PM   LIZZETTE-7 SCORE   Total Score 9 (mild anxiety)     Total  Score 9 10 6       Extended Session (60+ minutes): No  Interactive Complexity: No  Crisis: No    Treatment Objective(s) Addressed in This Session:  Target Behavior(s): disease management/lifestyle changes  related to adaptive approaches to managing anxious distress and mood difficulties    Depressed mood: Increase interest, pleasure in doing things.      Current Stressors / Issues:  [facility] met with Miller today for a follow-up, to help Miller continue to feel supported in his health behavior change and overall mental health.      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 Discussed plans to begin chemotherapy to help with treatment. Miller notes he was glad to receive information about how chemotherapy has changed over time and might help his condition.     Notes he was approached by a former  who offered him a lucrative job, though the job would require to re-locate him to [place]. Helped Miller process his thoughts about the offer and decide that staying in [place] makes the most sense for him, given his health. We did talk about the role of stress on health, how this might factor into his decision.       Answers for HPI/ROS submitted by the patient on 7/12/2022  If you checked off any problems, how difficult have these problems made it for you to do your work, take care of things at home, or get along with other people?: Somewhat difficult  PHQ9 TOTAL SCORE: 4    Answers for HPI/ROS submitted by the patient on 9/8/2022  If you checked off any problems, how difficult have these problems made it for you to do your work, take care of things at home, or get along with other people?: Somewhat difficult  PHQ9 TOTAL SCORE: 3    Answers for HPI/ROS submitted by the patient on 11/21/2022  If you checked off any problems, how difficult have these problems made it for you to do your work, take care of things at home, or get along with other people?: Very  difficult  PHQ9 TOTAL SCORE: 4      Answers for HPI/ROS submitted by the patient on 1/18/2023  If you checked off any problems, how difficult have these problems made it for you to do your work, take care of things at home, or get along with other people?: Extremely difficult  PHQ9 TOTAL SCORE: 8        Progress on Treatment Objective(s) / Homework:  Stable - ACTION (Actively working towards change); Intervened by reinforcing change plan / affirming steps taken      Solution-Focused Therapy    Explore patterns in patient's relationships and discuss options for new behaviors.    Explore patterns in patient's actions and choices and discuss options for new behaviors.    Behavioral Activation    Discuss steps patient can take to become more involved in meaningful activity.    Identify barriers to these activities and explore possible solutions.      Answers for HPI/ROS submitted by the patient on 3/21/2022  If you checked off any problems, how difficult have these problems made it for you to do your work, take care of things at home, or get along with other people?: Not difficult at all  PHQ9 TOTAL SCORE: 6      Answers for HPI/ROS submitted by the patient on 2/8/2022  If you checked off any problems, how difficult have these problems made it for you to do your work, take care of things at home, or get along with other people?: Somewhat difficult  PHQ9 TOTAL SCORE: 4      Answers for HPI/ROS submitted by the patient on 4/7/2021   LIZZETTE 7 TOTAL SCORE: 9  If you checked off any problems, how difficult have these problems made it for you to do your work, take care of things at home, or get along with other people?: Very difficult  PHQ9 TOTAL SCORE: 7*    *No SI    Answers for HPI/ROS submitted by the patient on 10/13/2020   If you checked off any problems, how difficult have these problems made it for you to do your work, take care of things at home, or get along with other people?: Somewhat difficult  PHQ9 TOTAL SCORE:  8*  LIZZETTE 7 TOTAL SCORE: 6      *No SI     Answers for HPI/ROS submitted by the patient on 12/29/2020   If you checked off any problems, how difficult have these problems made it for you to do your work, take care of things at home, or get along with other people?: Somewhat difficult  PHQ9 TOTAL SCORE: 5*  LIZZETTE 7 TOTAL SCORE: 8    *No SI     Answers for HPI/ROS submitted by the patient on 11/21/2022  If you checked off any problems, how difficult have these problems made it for you to do your work, take care of things at home, or get along with other people?: Very difficult  PHQ9 TOTAL SCORE: 4          Care Plan review completed: no    Medication Review:  No changes to current psychiatric medication(s)     Reports discontinuing zolpidem.       Current Outpatient Medications   Medication     acetaminophen (TYLENOL) 325 MG tablet     Alcohol Swabs (ALCOHOL PREP) 70 % PADS     ammonium lactate (AMLACTIN) 12 % external cream     aspirin (SM ASPIRIN ADULT LOW STRENGTH) 81 MG EC tablet     BD VIKTORIA U/F 32G X 4 MM insulin pen needle     benzoyl peroxide 5 % external liquid     Continuous Blood Gluc Sensor (FREESTYLE CLAUDIA 2 SENSOR) MISC     empagliflozin (JARDIANCE) 10 MG TABS tablet     insulin aspart (NOVOLOG PEN) 100 UNIT/ML pen     insulin degludec (TRESIBA FLEXTOUCH) 100 UNIT/ML pen     ketoconazole (NIZORAL) 2 % external shampoo     lamiVUDine (EPIVIR) 100 MG tablet     lisinopril (ZESTRIL) 10 MG tablet     metFORMIN (GLUCOPHAGE XR) 500 MG 24 hr tablet     metoprolol succinate ER (TOPROL XL) 25 MG 24 hr tablet     Misc Natural Products (NEURIVA PO)     Multiple Vitamin (TAB-A-GLADIS) TABS     mycophenolate (GENERIC EQUIVALENT) 250 MG capsule     order for DME     oxyCODONE (ROXICODONE) 5 MG tablet     pantoprazole (PROTONIX) 40 MG EC tablet     predniSONE (DELTASONE) 5 MG tablet     pseudoePHEDrine (SUDAFED) 60 MG tablet     rosuvastatin (CRESTOR) 20 MG tablet     tamsulosin (FLOMAX) 0.4 MG capsule     Vitamin D3 50 mcg  (2000 units) tablet     Current Facility-Administered Medications   Medication     aflibercept (EYLEA) injection prefilled syringe 2 mg     aflibercept (EYLEA) injection prefilled syringe 2 mg     dexamethasone (OZURDEX) intravitreal implant 0.7 mg     dexamethasone (OZURDEX) intravitreal implant 0.7 mg     faricimab-svoa (VABYSMO) intravitreal injection 6 mg     faricimab-svoa (VABYSMO) intravitreal injection 6 mg     lidocaine (PF) (XYLOCAINE) injection 1 mL       Medication Compliance:  Yes    Changes in Health Issues:   None reported    Chemical Use Review:   Substance Use: Chemical use reviewed, no active concerns identified      Tobacco Use: No current tobacco use.         Assessment: Current Emotional / Mental Status (status of significant symptoms):  Risk status (Self / Other harm or suicidal ideation)  Patient denies a history of suicidal ideation, suicide attempts, self-injurious behavior, homicidal ideation, homicidal behavior and and other safety concerns     Patient denies current fears or concerns for personal safety.  Patient denies current or recent suicidal ideation or behaviors.  Patient denies current or recent homicidal ideation or behaviors.  Patient denies current or recent self injurious behavior or ideation.  Patient denies other safety concerns.     A safety and risk management plan has not been developed at this time, however patient was encouraged to call [place] [phone] should there be a change in any of these risk factors.    Rock Suicide Severity Rating Scale (Short Version)      6/11/2020     9:18 PM 7/1/2020    11:00 AM 7/12/2021     2:30 PM 1/10/2022     9:28 PM 1/3/2023     6:31 AM 1/24/2023     6:22 AM 7/13/2023    10:31 AM   Rock Suicide Severity Rating (Short Version)   Over the past 2 weeks have you felt down, depressed, or hopeless? no no no no no no no   Over the past 2 weeks have you had thoughts of killing yourself? no no no no no  no   Have you ever attempted to  kill yourself? no no no no no  no   Q1 Wished to be Dead (Past Month)     no     Q2 Suicidal Thoughts (Past Month)     no     Q3 Suicidal Thought Method     no     Q4 Suicidal Intent without Specific Plan     no     Q5 Suicide Intent with Specific Plan     no     Q6 Suicide Behavior (Lifetime)     no     Level of Risk per Screen     low risk           Appearance:   Appropriate   Eye Contact:   Good   Psychomotor Behavior: TD evident   Attitude:   Cooperative  Interested Friendly Pleasant  Orientation:   All  Speech   Rate / Production: Normal/ Responsive   Volume:  Normal   Mood:    Depressed ; improving  Affect:    Appropriate    Thought Content:  Clear   Thought Form:  Goal Directed  Logical   Insight:    Good     Diagnoses:  1. Bipolar 1 disorder, depressed, mild (H)          Collateral Reports Completed:  Not Applicable    Plan: (Homework, other):  Continue with this writer for individual psychotherapy in 2/3 wks. He will continue to work on managing depression and anxiety through achievable behavioral activation ideas. Information about behavioral activation provided  Today he plans to continue walking 9k to 10k steps per day.  No CD issues.     Joseph Murillo Psy.D, LP   Behavioral Health Clinician   [facility] [place]              ______________________________________________________________________                                            Individual Treatment Plan    Patient's Name: Frandy Workman  YOB: 1965    Date of Creation: 3/1/2022  Date Treatment Plan Last Reviewed/Revised: 4/18/2023    DSM5 Diagnoses: 296.51 Bipolar I Disorder Current or Most Recent Episode Depressed, Mild or 300.02 (F41.1) Generalized Anxiety Disorder  Psychosocial / Contextual Factors: Post Solid Organ Tx  PROMIS (reviewed every 90 days): 4    Referral / Collaboration:  Referral to another professional/service is not indicated at this time..    Anticipated number of session for this  episode of care: 4-6  Anticipation frequency of session: Every other week  Anticipated Duration of each session: 38-52 minutes  Treatment plan will be reviewed in 90 days or when goals have been changed.       MeasurableTreatment Goal(s) related to diagnosis / functional impairment(s)  Goal 1: Patient will experience a reduction in depressive symptoms along with a corresponding increase in positive emotion and life satisfaction.      Objective #A (Patient Action)    Patient will Increase interest, engagement, and pleasure in doing things.  Status: Continued - Date(s): 4/18/2023    Intervention(s)  Therapist will help patient identify pleasant and mastery oriented events that elicit positive, relaxed mood.    Objective #B  Patient will Decrease frequency and intensity of feeling down, depressed, hopeless.  Status: Continued - Date(s): 4/18/2023    Intervention(s)  Therapist will introduce patient to cognitive-behavioral and acceptance and commitment therapy topics aimed to help reduce depression and anxiety    Objective #C  Patient will Identify negative self-talk and behaviors: challenge core beliefs, myths, and actions  Improve concentration, focus, and mindfulness in daily activities .  Status: Continued - Date(s): COMPLETE    Intervention(s)  Therapist will help patient identify and manage negative self-talk and automatic thoughts; introduce patient to cognitive distortions; help patient develop cognitive diffusion techniques      Goal 2: Patient will experience a reduction in anxious symptoms, along with a corresponding increase in relaxed emotional states and life satisfaction.      Objective #A (Patient Action)  Patient will use cognitive-behavioral and thought diffusion strategies identified in therapy to challenge anxious thoughts.    Status: Continued - Date(s): COMPLETE    Intervention(s)  Therapist will utilize CBT and ACT ideas to help patient challenge anxious thoughts and reduce intensity/duration of  "anxious distress    Objective #B  Patient will use \"worry time\" each day for 15 minutes of scheduled worry and then defer obsessive or anxious thinking until the next structured worry time.    Status: Continued - Date(s): COMPLETE    Intervention(s)  Therapist will teach patient how to effectively utilize worry time and/or thought logs/journals each day and incorporate more relaxation behaviors into their routine.    Objective #C  Patient will identify the stressors which contribute to feelings of anxiety  Patient will increase engagement in adaptive coping skills and recreational activities, such as exercise and healthy socialization, to manage distress.  Status: Continued - Date(s): COMPLETE    Intervention(s)  Therapist will help patient identify triggers/situational factors that contribute to anxiety and behavioral skills aimed to manage anxious distress.      Goal 3: Patiient will work toward adaptively managing bipolar-related depression and manic episodes      Objective #A (Patient Action)    Status: Continued - Date(s): COMPLETE    Patient will identify 2-3 signs or signals of emerging mood instability.    Intervention(s)  Therapist will provide educational materials on bipolar disorder and help identifying triggers for mood instability.    Objective #B  Patient will identify 2-3 strategies for more effectively managing Bipolar Disorder.    Status: Continued - Date(s): COMPLETE    Intervention(s)  Therapist will teach emotional recognition/identification. Skills aimed to effectively manage bipolar disorder.      Other Possible Therapeutic Intervention(s):    Psycho-education regarding mental health diagnoses and treatment options    Supportive Therapy    Provide affirmations, reflections, and establish working rapport    Emphasize and reflect on strength of therapeutic relationship    Skills training    Explore skills useful to client in current situation.    Skills include assertiveness, communication, " conflict management, problem-solving, relaxation, etc.    Solution-Focused Therapy    Explore patterns in patient's relationships and discuss options for new behaviors.    Explore patterns in patient's actions and choices and discuss options for new behaviors.    Cognitive-behavioral Therapy    Discuss common cognitive distortions, identify them in patient's life.    Explore ways to challenge, replace, and act against these cognitions.    Acceptance and Commitment Therapy    Explore and identify important values in patient's life.    Discuss ways to commit to behavioral activation around these values.    Psychodynamic psychotherapy    Discuss patient's emotional dynamics and issues and how they impact behaviors.    Explore patient's history of relationships and how they impact present behaviors.    Explore how to work with and make changes in these schemas and patterns.    Narrative Therapy    Explore the patient's story of his/her life from his/her perspective.    Explore alternate ways of understanding their experience, identifying exceptions, developing new themes.    Interpersonal Psychotherapy    Explore patterns in relationships that are effective or ineffective at helping patient reach their goals, find satisfying experience.    Discuss new patterns or behaviors to engage in for improved social functioning.    Behavioral Activation    Discuss steps patient can take to become more involved in meaningful activity.    Identify barriers to these activities and explore possible solutions.    Mindfulness-Based Strategies    Discuss skills based on development and application of mindfulness.    Skills drawn from compassion-focused therapy, dialectical behavior therapy, mindfulness-based stress reduction, mindfulness-based cognitive therapy, etc.      Patient has reviewed and agreed to the above plan.      Joseph Murillo Psy.D, LP   Behavioral Health Clinician   -[facility]      4/18/2023  Answers for HPI/ROS submitted by the patient on 2/28/2023  If you checked off any problems, how difficult have these problems made it for you to do your work, take care of things at home, or get along with other people?: Very difficult  PHQ9 TOTAL SCORE: 6    Answers for HPI/ROS submitted by the patient on 6/28/2023  If you checked off any problems, how difficult have these problems made it for you to do your work, take care of things at home, or get along with other people?: Somewhat difficult  PHQ9 TOTAL SCORE: 3

## 2023-07-21 ENCOUNTER — DOCUMENTATION ONLY (OUTPATIENT)
Dept: ONCOLOGY | Facility: CLINIC | Age: 58
End: 2023-07-21
Payer: MEDICARE

## 2023-07-21 DIAGNOSIS — Z79.899 ENCOUNTER FOR LONG-TERM (CURRENT) USE OF MEDICATIONS: ICD-10-CM

## 2023-07-21 DIAGNOSIS — C22.0 HCC (HEPATOCELLULAR CARCINOMA) (H): Primary | ICD-10-CM

## 2023-07-24 ENCOUNTER — TELEPHONE (OUTPATIENT)
Dept: ONCOLOGY | Facility: CLINIC | Age: 58
End: 2023-07-24

## 2023-07-24 ENCOUNTER — VIRTUAL VISIT (OUTPATIENT)
Dept: ENDOCRINOLOGY | Facility: CLINIC | Age: 58
End: 2023-07-24
Payer: MEDICARE

## 2023-07-24 DIAGNOSIS — E11.3293 TYPE 2 DIABETES MELLITUS WITH MILD NONPROLIFERATIVE RETINOPATHY OF BOTH EYES WITHOUT MACULAR EDEMA, UNSPECIFIED WHETHER LONG TERM INSULIN USE (H): ICD-10-CM

## 2023-07-24 PROCEDURE — 99213 OFFICE O/P EST LOW 20 MIN: CPT | Mod: 95 | Performed by: PHYSICIAN ASSISTANT

## 2023-07-24 NOTE — PATIENT INSTRUCTIONS
- Tresiba 36 units /day.  - Carbohydrate coverage to 1 unit:15 (was 10) g of carbohydrate   - Correction Novolounit Novolo mg /dl >180    - Empagliflozin 10 mg daily (had JERONIMO/hypovolemias on higher dose).  - Metformin- started to get diarrhea from it.  500 mg bid.     Stop jardiance and metformin during times of illness or dehydration.  Drink plenty of water.     Please let me know if you are having low blood sugars less than 70 or over 250 mg/dL.      If you have concerns, please send me a Sopheon message M-, call the clinic at [phone], or call [phone] after hours/weekends and ask to speak with the endocrinologist on call.

## 2023-07-24 NOTE — TELEPHONE ENCOUNTER
MFM Consult   PA Initiation    Medication: NEXAVAR 200 MG PO TABS  Insurance Company: [employer] - Phone [phone] Fax [phone]  Pharmacy Filling the Rx:    Filling Pharmacy Phone:    Filling Pharmacy Fax:    Start Date: 7/24/2023        Lor Braun [facility]Ph[facility]cology [facility] [address] [address]  Office: [phone]  Fax: [phone]  [email or web address]

## 2023-07-24 NOTE — LETTER
2023       RE: Frandy Workman  [address] [facility] [address]     Dear Colleague,    Thank you for referring your patient, Frandy Workman, to the [facility]. Please see a copy of my visit note below.    Virtual Visit Detail      Outcome for 23 11:48 AM: Data uploaded on [employer]  Yojana Wang MA  Outcome for 23 3:10 PM: Data obtained via [employer] website  Yojana Wang MA     Patient is showing 5/5 MNCM met.   Yojana Wang MA     Start time: 1139  End time: 1201  Provider location: off site- home  Patient location: off site- home.      HPI:     Frandy Workman is a 58 year old man here for follow up of type 2 diabetes complicated by nephropathy, retinopathy and neuropathy. 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 Unfortunately, he just found out he has new peritoneal metastatic disease.  He will be starting up therapy next week. He feels better than he has in years. 12,000 steps yesterday.   He has been having some lows and has had to cut back on novolog.     Appetite is normal.  He is eating less.      DM Regimen:  - Tresiba 36 units /day.  - Carbohydrate coverage to 1 unit:10 g of carbohydrate (5-10 units at a time).  - Correction Novolounit Novolo mg /dl >180    - Empagliflozin 10 mg daily (had JERONIMO/hypovolemias on higher dose).  - Metformin- started to get diarrhea from it. 500 mg daily.     Patient wears a camilo 2 sensor with an overall average of 176 mg/dL (CV 29%, TIR 60%, hypo 0%, severe hypo 0%) over the past 2 weeks.   Recent glucose is as follows:             Diabetes Control:   Lab Results   Component Value Date    A1C 6.0 01/10/2022    A1C 6.8 2021    A1C 6.0 2020    A1C 6.3  06/13/2020    A1C 6.6 08/08/2018    A1C 6.5 06/09/2017    A1C 7.8 10/25/2016       Past Medical History:   Diagnosis Date    Anemia 2013    Arthritis     BPH (benign prostatic hyperplasia)     CAD (coronary artery disease) 04/01/2019    Cholelithiasis     Conductive hearing loss 08/16/2017    Depressive disorder 1986    Suffer effects throughout life    Gastroesophageal reflux disease 12/01/2014    HCC (hepatocellular carcinoma) (H) 01/22/2019    History of diabetic retinopathy 07/2018    HTN (hypertension) 11/20/2019    Hyperlipidemia     Liver cirrhosis secondary to ESTRADA (H)     Liver transplanted (H) 11/11/2019    Portal vein thrombosis 04/11/2019    Type II diabetes mellitus (H)        Past Surgical History:   Procedure Laterality Date    BYPASS GRAFT ARTERY CORONARY N/A 7/14/2021    Procedure: median sternotomy, on cardiopulmonary bypass, CORONARY ARTERY BYPASS GRAFT (CABG) x2 with left greater saphenous vein endoscopic harvest and left internal mammery artery harvest;  Surgeon: Tom Zapata MD;  Location: [facility] OR    COLONOSCOPY      2015    COLONOSCOPY N/A 12/6/2019    Procedure: COLONOSCOPY, WITH POLYPECTOMY AND BIOPSY;  Surgeon: Adam Morton MD;  Location:  GI    CV CENTRAL VENOUS CATHETER PLACEMENT N/A 7/12/2021    Procedure: Central Venous Catheter Placement;  Surgeon: Fermin Polanco MD;  Location:  HEART CARDIAC CATH LAB    CV CORONARY ANGIOGRAM N/A 7/12/2021    Procedure: Coronary Angiogram;  Surgeon: Fermin Polanco MD;  Location:  HEART CARDIAC CATH LAB    CV HEART CATHETERIZATION WITH POSSIBLE INTERVENTION N/A 2/26/2019    Procedure: CORS;  Surgeon: Jagdish Hyot MD;  Location:  HEART CARDIAC CATH LAB    CV INTRA AORTIC BALLOON N/A 7/12/2021    Procedure: Intra Aortic Balloon Pump Insertion;  Surgeon: Fermin Polanco MD;  Location: [facility] CARDIAC CATH LAB    ESOPHAGOSCOPY, GASTROSCOPY, DUODENOSCOPY (EGD), COMBINED N/A  11/17/2016    Procedure: COMBINED ESOPHAGOSCOPY, GASTROSCOPY, DUODENOSCOPY (EGD);  Surgeon: Santi Rosas MD;  Location: [facility] GI    ESOPHAGOSCOPY, GASTROSCOPY, DUODENOSCOPY (EGD), COMBINED N/A 11/17/2017    Procedure: COMBINED ESOPHAGOSCOPY, GASTROSCOPY, DUODENOSCOPY (EGD);  EGD;  Surgeon: Santi Rosas MD;  Location: [facility] GI    ESOPHAGOSCOPY, GASTROSCOPY, DUODENOSCOPY (EGD), COMBINED N/A 12/28/2018    Procedure: EGD;  Surgeon: Santi Rosas MD;  [facility]    ESOPHAGOSCOPY, GASTROSCOPY, DUODENOSCOPY (EGD), COMBINED N/A 12/6/2019    Procedure: ESOPHAGOGASTRODUODENOSCOPY, WITH BIOPSY;  Surgeon: Adam Morton MD;  Location: [facility] GI    ESOPHAGOSCOPY, GASTROSCOPY, DUODENOSCOPY (EGD), COMBINED N/A 2/13/2020    Procedure: ESOPHAGOGASTRODUODENOSCOPY (EGD);  Surgeon: Santi Rosas MD;  Location: [facility] GI    EXCISE MASS ABDOMEN N/A 7/13/2023    Procedure: Laparoscopic lysis of adhesions, laparoscopic resection of abdominal mass;  Surgeon: Ruiz Chu MD;  Location: [facility] OR    HEAD & NECK SURGERY      12/2017 at [facility].     IMPLANT GOLD WEIGHT EYELID Right 11/16/2017    Procedure: IMPLANT WEIGHT EYELID;  Right Upper Eyelid Weight, right tarsal strip lower eyelid;  Surgeon: Milana Malave MD;  [facility]    IR CHEMO EMBOLIZATION  1/22/2019    KNEE SURGERY Left     ORTHOPEDIC SURGERY      PAROTIDECTOMY, RADICAL NECK DISSECTION Right 11/2/2017    Procedure: PAROTIDECTOMY, RADICAL NECK DISSECTION;  Right Superfacial Parotidectomy , Facial nerve repair. with [facility] facial nerve monitor.;  Surgeon: Asiya Morgan MD;  Location: [facility]    PHACOEMULSIFICATION CLEAR CORNEA WITH STANDARD INTRAOCULAR LENS IMPLANT Right 1/24/2023    Procedure: PHACOEMULSIFICATION, COMPLEX CATARACT, WITH INTRAOCULAR LENS IMPLANT WITH TRYPAN RIGHT EYE;  Surgeon: Enriqueta Martin MD;  Location: [facility] OR    PHACOEMULSIFICATION WITH STANDARD INTRAOCULAR LENS IMPLANT Left 1/3/2023    Procedure:  PHACOEMULSIFICATION, COMPLEX CATARACT, WITH STANDARD INTRAOCULAR LENS IMPLANT INSERTION LEFT EYE;  Surgeon: Enriqueta Martin MD;  Location: [facility] OR    PICC INSERTION Left 2017    4fr SL BioFlo PICC, 44cm in the L basilic vein w/ tip in the low SVC    RETURN LIVER TRANSPLANT N/A 2019    Procedure: Exploratory laparotomy, hematoma evacuation, abdominal washout;  Surgeon: Александр Ramos MD;  Location: [facility] OR    TRANSPLANT LIVER RECIPIENT  DONOR N/A 2019    Procedure: TRANSPLANT, LIVER, RECIPIENT,  DONOR;  Surgeon: Александр Ramos MD;  Location: [facility] OR    VASCULAR SURGERY         Family History   Problem Relation Age of Onset    Skin Cancer Mother     Cancer Mother         mastectomy    Diabetes Mother     Cerebrovascular Disease Mother     Thyroid Disease Mother     Depression Mother     Colon Cancer Father 60    Pancreatic Cancer Father 60    Prostate Cancer Father     Macular Degeneration Father     Glaucoma Father     Skin Cancer Father     Thyroid Disease Sister     Depression Sister     Asthma Sister     Sleep Apnea Brother     Colorectal Cancer Maternal Grandmother     Cancer Maternal Grandmother     Substance Abuse Maternal Grandmother         Alcohol    Prostate Cancer Maternal Grandfather     Substance Abuse Maternal Grandfather         Alcohol    Colorectal Cancer Paternal Grandmother     Liver Disease No family hx of     Melanoma No family hx of     Anesthesia Reaction No family hx of     Deep Vein Thrombosis (DVT) No family hx of        Social History     Socioeconomic History    Marital status:     Highest education level: Bachelor's degree (e.g., BA, AB, BS)   Tobacco Use    Smoking status: Former     Packs/day: 6.00     Years: 30.00     Pack years: 180.00     Types: Cigars, Cigarettes     Start date: 2016     Quit date: 10/25/2017     Years since quittin.0    Smokeless tobacco: Former     Types: Chew     Quit date: 10/31/2017    Tobacco  comments:     1 tin per week   Substance and Sexual Activity    Alcohol use: No     Alcohol/week: 0.0 standard drinks     Comment: quit Sept. 1996    Drug use: No    Sexual activity: Not Currently     Partners: Female     Birth control/protection: Condom   Other Topics Concern    Parent/sibling w/ CABG, MI or angioplasty before 65F 55M? Yes   Social History Narrative    [place] [facility] [place] [facility]     Social Determinants of Health     Intimate Partner Violence: Not At Risk    Fear of Current or Ex-Partner: No    Emotionally Abused: No    Physically Abused: No    Sexually Abused: No       Current Outpatient Medications   Medication    acetaminophen (TYLENOL) 325 MG tablet    Alcohol Swabs (ALCOHOL PREP) 70 % PADS    ammonium lactate (AMLACTIN) 12 % external cream    aspirin (SM ASPIRIN ADULT LOW STRENGTH) 81 MG EC tablet    BD VIKTORIA U/F 32G X 4 MM insulin pen needle    benzoyl peroxide 5 % external liquid    Continuous Blood Gluc Sensor (FREESTYLE CLAUDIA 2 SENSOR) MISC    empagliflozin (JARDIANCE) 10 MG TABS tablet    insulin aspart (NOVOLOG PEN) 100 UNIT/ML pen    insulin degludec (TRESIBA FLEXTOUCH) 100 UNIT/ML pen    ketoconazole (NIZORAL) 2 % external shampoo    lamiVUDine (EPIVIR) 100 MG tablet    lisinopril (ZESTRIL) 10 MG tablet    metFORMIN (GLUCOPHAGE XR) 500 MG 24 hr tablet    metoprolol succinate ER (TOPROL XL) 25 MG 24 hr tablet    Misc Natural Products (NEURIVA PO)    Multiple Vitamin (TAB-A-GLADIS) TABS    mycophenolate (GENERIC EQUIVALENT) 250 MG capsule    order for DME    oxyCODONE (ROXICODONE) 5 MG tablet    pantoprazole (PROTONIX) 40 MG EC tablet    predniSONE (DELTASONE) 5 MG tablet    pseudoePHEDrine (SUDAFED) 60 MG tablet    rosuvastatin (CRESTOR) 20 MG tablet    tamsulosin (FLOMAX) 0.4 MG capsule    Vitamin D3 50 mcg (2000 units) tablet     Current Facility-Administered Medications   Medication    aflibercept (EYLEA) injection prefilled syringe 2 mg    aflibercept (EYLEA) injection prefilled syringe  2 mg    dexamethasone (OZURDEX) intravitreal implant 0.7 mg    dexamethasone (OZURDEX) intravitreal implant 0.7 mg    faricimab-svoa (VABYSMO) intravitreal injection 6 mg    faricimab-svoa (VABYSMO) intravitreal injection 6 mg    lidocaine (PF) (XYLOCAINE) injection 1 mL          Allergies   Allergen Reactions    Codeine Other (See Comments)     Cannot take due to liver  Cannot tolerate oral narcotics    Seasonal Allergies      Sneezing, coughing, runny and itchy eyes       Physical Exam  There were no vitals taken for this visit.  GENERAL: healthy, alert and no distress  RESP: no audible wheeze, cough, or visible cyanosis.  No visible retractions or increased work of breathing.  Able to speak fully in complete sentences.  PSYCH: mentation appears normal, affect normal/bright, judgement and insight intact, normal speech and appearance well-groomed    RESULTS  Lab Results   Component Value Date    A1C 6.3 (H) 06/14/2023    A1C 6.9 (H) 12/14/2022    A1C 6.0 (H) 01/10/2022    A1C 6.8 (H) 07/13/2021    A1C 6.0 (H) 12/30/2020    A1C 6.3 (H) 06/13/2020    A1C 6.6 (H) 08/08/2018    A1C 6.5 (H) 06/09/2017    A1C 7.8 (H) 10/25/2016    HEMOGLOBINA1 4.8 03/04/2020    HEMOGLOBINA1 5.1 12/02/2019    HEMOGLOBINA1 5.4 08/14/2019    HEMOGLOBINA1 6.1 (A) 02/11/2019    HEMOGLOBINA1 8.1 (A) 04/11/2018       TSH   Date Value Ref Range Status   04/25/2022 1.24 0.40 - 4.00 mU/L Final   10/04/2021 1.90 0.40 - 4.00 mU/L Final   01/28/2021 0.91 0.40 - 4.00 mU/L Final   01/24/2020 1.91 0.40 - 4.00 mU/L Final   08/08/2018 0.49 0.40 - 4.00 mU/L Final   02/08/2018 0.71 0.40 - 4.00 mU/L Final   06/09/2017 0.42 0.40 - 4.00 mU/L Final     T4 Free   Date Value Ref Range Status   08/08/2018 1.21 0.76 - 1.46 ng/dL Final       ALT   Date Value Ref Range Status   06/14/2023 28 0 - 70 U/L Final     Comment:     Reference intervals for this test were updated on 6/12/2023 to more accurately reflect our healthy population. There may be differences in the  flagging of prior results with similar values performed with this method. Interpretation of those prior results can be made in the context of the updated reference intervals.     03/15/2023 23 10 - 50 U/L Final   06/28/2021 20 0 - 70 U/L Final   06/14/2021 21 0 - 70 U/L Final   ]    Recent Labs   Lab Test 06/14/23  0924 12/14/22  0847 09/07/21  1025   CHOL 194 220* 176   HDL 37* 40 34*   LDL 78  --  95   TRIG 394* 498* 233*       Lab Results   Component Value Date     06/14/2023     06/28/2021      Lab Results   Component Value Date    POTASSIUM 5.0 06/14/2023    POTASSIUM 4.7 07/20/2022    POTASSIUM 4.6 06/28/2021     Lab Results   Component Value Date    CHLORIDE 106 06/14/2023    CHLORIDE 105 07/20/2022    CHLORIDE 107 06/28/2021     Lab Results   Component Value Date    DEBORAH 9.7 06/14/2023    DEBORAH 9.1 06/28/2021     Lab Results   Component Value Date    CO2 23 06/14/2023    CO2 26 07/20/2022    CO2 25 06/28/2021     Lab Results   Component Value Date    BUN 34.0 06/14/2023    BUN 32 07/20/2022    BUN 33 06/28/2021     Lab Results   Component Value Date    CR 2.0 06/14/2023    CR 1.88 06/14/2023    CR 1.62 06/28/2021       GFR Estimate   Date Value Ref Range Status   06/14/2023 41 (L) >60 mL/min/1.73m2 Final     Comment:     eGFR calculated using 2021 CKD-EPI equation.   03/30/2023 43 (L) >60 mL/min/1.73m2 Final     Comment:     eGFR calculated using 2021 CKD-EPI equation.   03/15/2023 34 (L) >60 mL/min/1.73m2 Final     Comment:     eGFR calculated using 2021 CKD-EPI equation.   06/28/2021 46 (L) >60 mL/min/[1.73_m2] Final     Comment:     Non  GFR Calc  Starting 12/18/2018, serum creatinine based estimated GFR (eGFR) will be   calculated using the Chronic Kidney Disease Epidemiology Collaboration   (CKD-EPI) equation.     06/14/2021 49 (L) >60 mL/min/[1.73_m2] Final     Comment:     Non  GFR Calc  Starting 12/18/2018, serum creatinine based estimated GFR (eGFR) will be    calculated using the Chronic Kidney Disease Epidemiology Collaboration   (CKD-EPI) equation.     06/04/2021 46 (L) >60 mL/min/[1.73_m2] Final     Comment:     Non  GFR Calc  Starting 12/18/2018, serum creatinine based estimated GFR (eGFR) will be   calculated using the Chronic Kidney Disease Epidemiology Collaboration   (CKD-EPI) equation.       GFR, ESTIMATED POCT   Date Value Ref Range Status   06/14/2023 38 (L) >60 mL/min/1.73m2 Final   12/14/2022 40 (L) >60 mL/min/1.73m2 Final   06/08/2022 46 (L) >60 mL/min/1.73m2 Final     GFR Estimate If Black   Date Value Ref Range Status   06/28/2021 54 (L) >60 mL/min/[1.73_m2] Final     Comment:      GFR Calc  Starting 12/18/2018, serum creatinine based estimated GFR (eGFR) will be   calculated using the Chronic Kidney Disease Epidemiology Collaboration   (CKD-EPI) equation.     06/14/2021 57 (L) >60 mL/min/[1.73_m2] Final     Comment:      GFR Calc  Starting 12/18/2018, serum creatinine based estimated GFR (eGFR) will be   calculated using the Chronic Kidney Disease Epidemiology Collaboration   (CKD-EPI) equation.     06/04/2021 53 (L) >60 mL/min/[1.73_m2] Final     Comment:      GFR Calc  Starting 12/18/2018, serum creatinine based estimated GFR (eGFR) will be   calculated using the Chronic Kidney Disease Epidemiology Collaboration   (CKD-EPI) equation.         Lab Results   Component Value Date    MICROL 401.0 03/15/2023    MICROL 47 04/20/2022    MICROL 563 02/26/2021     No results found for: MICROALBUMIN  No results found for: CPEPT, GADAB, ISCAB    Vitamin B12   Date Value Ref Range Status   01/11/2022 2,179 (H) 193 - 986 pg/mL Final   06/13/2020 682 193 - 986 pg/mL Final   02/04/2019 3,006 (H) 193 - 986 pg/mL Final       Most recent eye exam date: : Not Found     133/74, pulse 89  135/76, pulse 91  137/71, 95  158/79, 97  138/75, 81    Assessment/Plan:     1.  Type 2 diabetes- Doing well.  Average  "glucose is down significantly, however he is having lows currently.  Will relax carb ratio today.  He will get in touch if he has any further steroids with his cancer treatment- if so, may need to add NPH insulin.   We made the following plan today (instructions given to patient):   Continue the following treatment plan for your diabetes:     - Tresiba 36 units /day.  - Carbohydrate coverage to 1 unit:15 (was 10) g of carbohydrate   - Correction Novolounit Novolo mg /dl >180    - Empagliflozin 10 mg daily (had JERONIMO/hypovolemias on higher dose).  - Metformin- started to get diarrhea from it.  500 mg bid.     Stop jardiance and metformin during times of illness or dehydration.  Drink plenty of water.     Please let me know if you are having low blood sugars less than 70 or over 250 mg/dL.      If you have concerns, please send me a [employer] message M-Th, call the clinic at [phone], or call [phone] after hours/weekends and ask to speak with the endocrinologist on call.      2.  Risk factors-     Retinopathy:  Yes.  Had eye exam within last year.   Nephropathy:  BP historically well controlled.+ albuminuria. On SGLT-2 inhibitor and increased lisinopril recently.  Creatinine stable. GFR 46.    Neuropathy: Yes.   Feet: OK, no ulcers. +neuropathy \"Constricting feeling.\" Sees podiatry. Wears diabetic shoe.   Lipids:  TG were high at last check. Goal LDL <70. Patient taking rosuvastatin 10 mg daily.      3.  F/U in 3 months, sooner with concerns.    26 minutes spent on the date of the encounter doing chart review, review of test results, review and interpretation of glucose data, patient visit and documentation, counseling/coordination of care, and discussion of follow up plan for worsening hyper and hypoglycemia.  The patient understood and is satisfied with today's visit.          Frannie Vasquez PA-C, MPAS   [facility]  Department of Medicine  Division of Endocrinology and Diabetes      "

## 2023-07-24 NOTE — NURSING NOTE
Patient denies any changes since echeck-in regarding medication and allergies and states all information entered during echeck-in remains accurate.    Is the patient currently in the state of [place]? YES    Visit mode:VIDEO    If the visit is dropped, the patient can be reconnected by: VIDEO VISIT: Text to cell phone: [phone]    Will anyone else be joining the visit? NO      How would you like to obtain your AVS? MyChart    Are changes needed to the allergy or medication list? NO    Reason for visit: RECHECK

## 2023-07-25 DIAGNOSIS — E11.3593 PROLIFERATIVE DIABETIC RETINOPATHY OF BOTH EYES ASSOCIATED WITH TYPE 2 DIABETES MELLITUS, UNSPECIFIED PROLIFERATIVE RETINOPATHY TYPE (H): Primary | ICD-10-CM

## 2023-07-25 NOTE — TELEPHONE ENCOUNTER
Prior Authorization Approval    Medication: NEXAVAR 200 MG PO TABS  Authorization Effective Date: 7/25/2023  Authorization Expiration Date: 2/6/2031  Approved Dose/Quantity:   Reference #: [id number]   Insurance Company: [employer] - Phone [phone] Fax [phone]  Expected CoPay: $1100     CoPay Card Available: No    Financial Assistance Needed:   Which Pharmacy is filling the prescription:    Pharmacy Notified: Yes  Patient Notified: Yes        Lor Braun CPhTOncology Pharmacy Liaison     [facility] [address] [address]  Office: [phone]  Fax: [phone]  [email or web address]

## 2023-07-26 ENCOUNTER — VIRTUAL VISIT (OUTPATIENT)
Dept: SURGERY | Facility: CLINIC | Age: 58
End: 2023-07-26
Attending: SURGERY
Payer: MEDICARE

## 2023-07-26 VITALS — WEIGHT: 195 LBS | HEIGHT: 69 IN | BODY MASS INDEX: 28.88 KG/M2

## 2023-07-26 DIAGNOSIS — C22.0 HEPATOCELLULAR CARCINOMA (H): Primary | ICD-10-CM

## 2023-07-26 DIAGNOSIS — C79.89 METASTATIC HEPATOCELLULAR CARCINOMA TO SOFT TISSUE (H): ICD-10-CM

## 2023-07-26 DIAGNOSIS — C22.0 METASTATIC HEPATOCELLULAR CARCINOMA TO SOFT TISSUE (H): ICD-10-CM

## 2023-07-26 PROCEDURE — 99024 POSTOP FOLLOW-UP VISIT: CPT | Performed by: SURGERY

## 2023-07-26 ASSESSMENT — PAIN SCALES - GENERAL: PAINLEVEL: MODERATE PAIN (5)

## 2023-07-26 NOTE — PROGRESS NOTES
Feeling well  Doing a lot of walking - 2-3 miles per day  Incisions healing well    Pathology reveals metastatic HCC consistent with peritoneal disease.    Has seen [facility] and plans for treatment are established    Overall doing great  Follow-up with me PRN

## 2023-07-26 NOTE — OP NOTE
Procedure Date: 07/13/2023    SURGEON:  Ruiz Chu MD.    FIRST ASSISTANT:  Shawanda Wilkins [facility], PGY5.    PREOPERATIVE DIAGNOSIS:  Enlarging abdominal mass.    POSTOPERATIVE DIAGNOSIS:  Peritoneal metastases from hepatocellular cancer.    PROCEDURES PERFORMED:     1.  Open-converted-to-laparoscopic lysis of adhesions.  2.  Laparoscopic resection of abdominal mass, less than 5 cm.    ANESTHESIA:  General.    ESTIMATED BLOOD LOSS:  None.    SPECIMEN:  Right paracecal mass.    FINDINGS:    1.  Peritoneal mass identified in the right lower quadrant adjacent to the cecum.    2.  Frozen section confirming metastatic hepatocellular cancer.    DETAILS:  The patient was put to sleep and positioned by anesthesia.  He was prepped and draped sterilely.  Pause for the cause was performed and was accurate.  We began with a small open incision along the patient's right flank, immediately anterior to where imaging had shown a mass in the soft tissue of his abdomen.  Through that incision, we were able to palpate the area but could not easily identify any lesions.  In addition, there were several adhesions locally that were limiting our ability to visualize the abdomen through a small incision.  We used gentle blunt dissection to free up a space then placed a 12 mm balloon trocar through that incision, insufflated the abdomen and converted to a laparoscopic approach so that we could get better visualization of the entirety of the abdominal cavity.  Once we did that, we could see there were extensive intra-abdominal adhesions from the patient's previous liver transplantation.  We placed additional trocars in free spaces that we could identify then used a combination of Bovie electrocautery and Harmonic scalpel to take down abdominal adhesions to allow us to visualize the abdominal contents.  This lasted approximately 30 minutes.  Once this was achieved, we were able to evaluate the areas of concern on the preoperative imaging.   We did find a mass adjacent to the cecum, consistent with preoperative imaging, which was adherent to the cecum as well as the lateral abdominal wall.  This required very careful dissection to not injure the cecum, and we used a combination of sharp dissection with scissors as well as Harmonic scalpel.  We were able to free up the cecum without any injury then resected the lesion from the abdominal wall using the Harmonic scalpel.  It was placed in an Endo Catch bag, removed from the abdomen and sent for frozen section.  Frozen section confirmed the presence of metastatic hepatocellular cancer.  No other gross disease was identified in the abdomen.  The second small lesion seen on preoperative imaging could not be identified.  With that, we desufflated the abdomen and removed our ports.  At the largest port site that we initially began with, the fascia was closed using #1 Vicryl in interrupted figure-of-eight fashion.  The subcutaneous tissues were irrigated, and the remaining incisions were closed using 4-0 Monocryl followed by Dermabond.  The patient tolerated the procedure well, was extubated in the operating room and transferred to the recovery room in stable condition.  I was present throughout the entire procedure as described.    Ruiz Chu MD        D: 2023   T: 2023   MT: myesha    Name:     DAVID SANDERS  MRN:      [id number]        Account:        [id number]   :      1965           Procedure Date: 2023     Document: [id number]

## 2023-07-26 NOTE — NURSING NOTE
Is the patient currently in the state of [place]? YES    Visit mode:TELEPHONE    If the visit is dropped, the patient can be reconnected by: TELEPHONE VISIT: Phone number: [phone]    Will anyone else be joining the visit? NO    How would you like to obtain your AVS? MyChart    Are changes needed to the allergy or medication list? YES: Insulin dose has decreased     Reason for visit: RECHECK    Jie Baird, VF/LPN

## 2023-07-26 NOTE — LETTER
7/26/2023         RE: Frandy Workman  [address] [facility] [address]        Dear Colleague,    Thank you for referring your patient, Frandy Workman, to the [facility]. Please see a copy of my visit note below.    Virtual Visit Details    Type of service:  Telephone Visit   Phone call duration: *** minutes     Feeling well  Doing a lot of walking - 2-3 miles per day  Incisions healing well    Pathology reveals metastatic HCC consistent with peritoneal disease.    Has seen [facility] and plans for treatment are established    Overall doing great  Follow-up with me PRN      Again, thank you for allowing me to participate in the care of your patient.        Sincerely,        Ruiz Chu MD

## 2023-07-26 NOTE — LETTER
7/26/2023         RE: Frandy Workman  [address] [facility] [address]        Dear Colleague,    Thank you for referring your patient, Frandy Workman, to the [facility]. Please see a copy of my visit note below.    Virtual Visit Details    Type of service:  Telephone Visit   Phone call duration: 10 minutes     Feeling well  Doing a lot of walking - 2-3 miles per day  Incisions healing well    Pathology reveals metastatic HCC consistent with peritoneal disease.    Has seen Cherelle and plans for treatment are established    Overall doing great  Follow-up with me PRN      Again, thank you for allowing me to participate in the care of your patient.        Sincerely,        Ruiz Chu MD

## 2023-07-28 ENCOUNTER — ANCILLARY PROCEDURE (OUTPATIENT)
Dept: CT IMAGING | Facility: CLINIC | Age: 58
End: 2023-07-28
Attending: INTERNAL MEDICINE
Payer: MEDICARE

## 2023-07-28 ENCOUNTER — LAB (OUTPATIENT)
Dept: LAB | Facility: CLINIC | Age: 58
End: 2023-07-28
Payer: MEDICARE

## 2023-07-28 ENCOUNTER — TELEPHONE (OUTPATIENT)
Dept: ONCOLOGY | Facility: CLINIC | Age: 58
End: 2023-07-28

## 2023-07-28 DIAGNOSIS — C78.6 MALIGNANT NEOPLASM METASTATIC TO PERITONEUM (H): ICD-10-CM

## 2023-07-28 DIAGNOSIS — Z94.4 STATUS POST LIVER TRANSPLANTATION (H): ICD-10-CM

## 2023-07-28 DIAGNOSIS — C22.0 HCC (HEPATOCELLULAR CARCINOMA) (H): Primary | ICD-10-CM

## 2023-07-28 DIAGNOSIS — C22.0 HCC (HEPATOCELLULAR CARCINOMA) (H): ICD-10-CM

## 2023-07-28 DIAGNOSIS — Z94.4 LIVER REPLACED BY TRANSPLANT (H): ICD-10-CM

## 2023-07-28 DIAGNOSIS — Z79.899 ENCOUNTER FOR LONG-TERM (CURRENT) USE OF MEDICATIONS: ICD-10-CM

## 2023-07-28 LAB
AFP SERPL-MCNC: 4.3 NG/ML
ALBUMIN SERPL BCG-MCNC: 4.7 G/DL (ref 3.5–5.2)
ALP SERPL-CCNC: 93 U/L (ref 40–129)
ALT SERPL W P-5'-P-CCNC: 25 U/L (ref 0–70)
ANION GAP SERPL CALCULATED.3IONS-SCNC: 10 MMOL/L (ref 7–15)
AST SERPL W P-5'-P-CCNC: 23 U/L (ref 0–45)
BASOPHILS # BLD AUTO: 0 10E3/UL (ref 0–0.2)
BASOPHILS NFR BLD AUTO: 0 %
BILIRUB SERPL-MCNC: 0.4 MG/DL
BUN SERPL-MCNC: 56.8 MG/DL (ref 8–23)
CALCIUM SERPL-MCNC: 9.1 MG/DL (ref 8.6–10)
CHLORIDE SERPL-SCNC: 103 MMOL/L (ref 98–107)
CHOLEST SERPL-MCNC: 178 MG/DL
CREAT BLD-MCNC: 2.6 MG/DL (ref 0.7–1.3)
CREAT SERPL-MCNC: 2.46 MG/DL (ref 0.67–1.17)
DEPRECATED HCO3 PLAS-SCNC: 17 MMOL/L (ref 22–29)
EOSINOPHIL # BLD AUTO: 0.1 10E3/UL (ref 0–0.7)
EOSINOPHIL NFR BLD AUTO: 1 %
ERYTHROCYTE [DISTWIDTH] IN BLOOD BY AUTOMATED COUNT: 13.4 % (ref 10–15)
GFR SERPL CREATININE-BSD FRML MDRD: 28 ML/MIN/1.73M2
GFR SERPL CREATININE-BSD FRML MDRD: 29 ML/MIN/1.73M2
GLUCOSE SERPL-MCNC: 60 MG/DL (ref 70–99)
HCT VFR BLD AUTO: 37.3 % (ref 40–53)
HDLC SERPL-MCNC: 34 MG/DL
HGB BLD-MCNC: 11.9 G/DL (ref 13.3–17.7)
IMM GRANULOCYTES # BLD: 0.1 10E3/UL
IMM GRANULOCYTES NFR BLD: 1 %
LDLC SERPL CALC-MCNC: ABNORMAL MG/DL
LYMPHOCYTES # BLD AUTO: 1.2 10E3/UL (ref 0.8–5.3)
LYMPHOCYTES NFR BLD AUTO: 14 %
MAGNESIUM SERPL-MCNC: 2.2 MG/DL (ref 1.7–2.3)
MCH RBC QN AUTO: 31.1 PG (ref 26.5–33)
MCHC RBC AUTO-ENTMCNC: 31.9 G/DL (ref 31.5–36.5)
MCV RBC AUTO: 97 FL (ref 78–100)
MONOCYTES # BLD AUTO: 0.5 10E3/UL (ref 0–1.3)
MONOCYTES NFR BLD AUTO: 6 %
NEUTROPHILS # BLD AUTO: 6.9 10E3/UL (ref 1.6–8.3)
NEUTROPHILS NFR BLD AUTO: 78 %
NONHDLC SERPL-MCNC: 144 MG/DL
NRBC # BLD AUTO: 0 10E3/UL
NRBC BLD AUTO-RTO: 0 /100
PHOSPHATE SERPL-MCNC: 3.4 MG/DL (ref 2.5–4.5)
PLATELET # BLD AUTO: 182 10E3/UL (ref 150–450)
POTASSIUM SERPL-SCNC: 5.5 MMOL/L (ref 3.4–5.3)
PROT SERPL-MCNC: 7.3 G/DL (ref 6.4–8.3)
RBC # BLD AUTO: 3.83 10E6/UL (ref 4.4–5.9)
SODIUM SERPL-SCNC: 130 MMOL/L (ref 136–145)
TRIGL SERPL-MCNC: 405 MG/DL
WBC # BLD AUTO: 8.8 10E3/UL (ref 4–11)

## 2023-07-28 PROCEDURE — 80053 COMPREHEN METABOLIC PANEL: CPT | Performed by: PATHOLOGY

## 2023-07-28 PROCEDURE — 99000 SPECIMEN HANDLING OFFICE-LAB: CPT | Performed by: PATHOLOGY

## 2023-07-28 PROCEDURE — 80061 LIPID PANEL: CPT | Performed by: PATHOLOGY

## 2023-07-28 PROCEDURE — 74177 CT ABD & PELVIS W/CONTRAST: CPT | Mod: MG | Performed by: RADIOLOGY

## 2023-07-28 PROCEDURE — 82105 ALPHA-FETOPROTEIN SERUM: CPT | Performed by: INTERNAL MEDICINE

## 2023-07-28 PROCEDURE — G1010 CDSM STANSON: HCPCS | Performed by: RADIOLOGY

## 2023-07-28 PROCEDURE — 84100 ASSAY OF PHOSPHORUS: CPT | Performed by: PATHOLOGY

## 2023-07-28 PROCEDURE — 71260 CT THORAX DX C+: CPT | Mod: MG | Performed by: RADIOLOGY

## 2023-07-28 PROCEDURE — 36415 COLL VENOUS BLD VENIPUNCTURE: CPT | Performed by: PATHOLOGY

## 2023-07-28 PROCEDURE — 83735 ASSAY OF MAGNESIUM: CPT | Performed by: PATHOLOGY

## 2023-07-28 PROCEDURE — 85025 COMPLETE CBC W/AUTO DIFF WBC: CPT | Performed by: PATHOLOGY

## 2023-07-28 PROCEDURE — 82565 ASSAY OF CREATININE: CPT | Mod: 91 | Performed by: PATHOLOGY

## 2023-07-28 RX ORDER — IOPAMIDOL 755 MG/ML
95 INJECTION, SOLUTION INTRAVASCULAR ONCE
Status: COMPLETED | OUTPATIENT
Start: 2023-07-28 | End: 2023-07-28

## 2023-07-28 RX ORDER — SORAFENIB 200 MG/1
400 TABLET, FILM COATED ORAL 2 TIMES DAILY
Qty: 120 TABLET | Refills: 0 | Status: SHIPPED | OUTPATIENT
Start: 2023-07-28 | End: 2023-08-10

## 2023-07-28 RX ADMIN — IOPAMIDOL 95 ML: 755 INJECTION, SOLUTION INTRAVASCULAR at 10:10

## 2023-07-28 NOTE — DISCHARGE INSTRUCTIONS

## 2023-07-28 NOTE — ORAL ONC MGMT
Oral Chemotherapy Monitoring Program     Placed call to patient in follow up of oral chemotherapy. Hoping to touch base with Miller in order to do some new education regarding sorafenib.     Left message requesting call back. No drug names were mentioned. Will update when response received.     Pete Earl, PharmD, BCPS, BCOP  Hematology/Oncology Clinical Pharmacist  [facility]  [phone]

## 2023-07-28 NOTE — ORAL ONC MGMT
Oral Chemotherapy Monitoring Program    Lab Monitoring Plan  CMP, Mag, Phos: q2 weeks x2 mo, then monthly  CBC: monthly  BP at 1 week, q2 weeks x 2 months, then monthly  EKG baseline, once at 2-4 weeks, once at 8-12 weeks, then quarterly  Miller would prefer to have labs done here at the [facility].  Subjective/Objective:  Frandy Workman is a 58 year old male contacted by phone for an initial visit for oral chemotherapy education. I reviewed overall education of sorafenib with Miller. I discussed the 2 drug interactions we have listed for him. Miller is taking pantoprazole for acid reflux and he is willing to stop this, as there is the potential for decreased absorption of sorafenib. He will supplement with Tums or famotidine as needed. Miller has been taking more acetaminophen since his recent surgery. There are concerns with additive effects on his liver. He is doing well and just met with his surgeon and he plans on titrating this down and stopping.    EKG wnl today. His labs were slightly off, mainly Scr/K/Na and BG of 60. 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 He also took his novolog this morning despite not eating.    Miller is aware of the need to check his BP while on sorafenib. He is on lisinopril and Toprol currently and previous had been checking his blood pressure until it has normalized more recently. I informed him of the plan above and also the need to check more often if his BP gets high.        7/21/2023     4:00 PM 7/28/2023    12:00 PM 7/28/2023     2:00 PM   ORAL CHEMOTHERAPY   Assessment Type Initial Work up Left Voicemail New Teach   Diagnosis Code Hepatocellular Cancer Hepatocellular Cancer Hepatocellular Cancer   Providers Dr Cherelle Villarreal   [facility]   Is this patient followed by the [facility] OC team? No No No   Drug Name  "Nexavar (sorafenib) Nexavar (sorafenib) Nexavar (sorafenib)   Dose 400 mg  400 mg   Current Schedule BID  BID   Cycle Details Continuous  Continuous   Planned next cycle start date   7/31/2023   Any new drug interactions? Yes  Yes   Pharmacist Intervention? Yes  Yes   Intervention(s) Patient Education  Patient Education   Is the dose as ordered appropriate for the patient? Yes  Yes       Last PHQ-2 Score on record:       7/24/2023    11:09 AM 5/31/2023    10:28 AM   PHQ-2 ( 1999 [employer])   Q1: Little interest or pleasure in doing things 0 0   Q2: Feeling down, depressed or hopeless 0 0   PHQ-2 Score 0 0   PHQ-2 Total Score (12-17 Years)- Positive if 3 or more points; Administer PHQ-A if positive 0 0       Vitals:  BP:   BP Readings from Last 1 Encounters:   07/20/23 118/68     Wt Readings from Last 1 Encounters:   07/26/23 88.5 kg (195 lb)     Estimated body surface area is 2.07 meters squared as calculated from the following:    Height as of 7/26/23: 1.74 m (5' 8.5\").    Weight as of 7/26/23: 88.5 kg (195 lb).    Labs:  _  Result Component Current Result Ref Range   Sodium 130 (L) (7/28/2023) 136 - 145 mmol/L     _  Result Component Current Result Ref Range   Potassium 5.5 (H) (7/28/2023) 3.4 - 5.3 mmol/L     _  Result Component Current Result Ref Range   Calcium 9.1 (7/28/2023) 8.6 - 10.0 mg/dL     _  Result Component Current Result Ref Range   Magnesium 2.2 (7/28/2023) 1.7 - 2.3 mg/dL     _  Result Component Current Result Ref Range   Phosphorus 3.4 (7/28/2023) 2.5 - 4.5 mg/dL     _  Result Component Current Result Ref Range   Albumin 4.7 (7/28/2023) 3.5 - 5.2 g/dL     _  Result Component Current Result Ref Range   Urea Nitrogen 56.8 (H) (7/28/2023) 8.0 - 23.0 mg/dL     _  Result Component Current Result Ref Range   Creatinine 2.46 (H) (7/28/2023) 0.67 - 1.17 mg/dL     _  Result Component Current Result Ref Range   AST 23 (7/28/2023) 0 - 45 U/L     _  Result Component Current Result Ref Range   ALT 25 (7/28/2023) " 0 - 70 U/L     _  Result Component Current Result Ref Range   Bilirubin Total 0.4 (7/28/2023) <=1.2 mg/dL     _  Result Component Current Result Ref Range   WBC Count 8.8 (7/28/2023) 4.0 - 11.0 10e3/uL     _  Result Component Current Result Ref Range   Hemoglobin 11.9 (L) (7/28/2023) 13.3 - 17.7 g/dL     _  Result Component Current Result Ref Range   Platelet Count 182 (7/28/2023) 150 - 450 10e3/uL     No results found for ANC within last 30 days.     _  Result Component Current Result Ref Range   Absolute Neutrophils 6.9 (7/28/2023) 1.6 - 8.3 10e3/uL        Assessment:  Patient is appropriate to start therapy. Regan Man is checking with Dr. Cherelle fairchild to ensure labs from today are ok.    Plan:  Basic chemotherapy teaching was reviewed with the patient including indication, start date of therapy, dose, administration, adverse effects, missed doses, food and drug interactions, monitoring, side effect management, office contact information, and safe handling. Written materials were provided and all questions answered.    Follow-Up:  1 week after starting for initial assessment  2 weeks after starting for first lab appt     Pete Earl, PharmD, BCPS, BCOP  Hematology/Oncology Clinical Pharmacist  Oral Chemotherapy Monitoring Program  [facility]  [phone]

## 2023-08-01 ENCOUNTER — TELEPHONE (OUTPATIENT)
Dept: TRANSPLANT | Facility: CLINIC | Age: 58
End: 2023-08-01
Payer: MEDICARE

## 2023-08-01 ENCOUNTER — TELEPHONE (OUTPATIENT)
Dept: ONCOLOGY | Facility: CLINIC | Age: 58
End: 2023-08-01
Payer: MEDICARE

## 2023-08-01 LAB
ATRIAL RATE - MUSE: 90 BPM
DIASTOLIC BLOOD PRESSURE - MUSE: NORMAL MMHG
INTERPRETATION ECG - MUSE: NORMAL
P AXIS - MUSE: 6 DEGREES
PR INTERVAL - MUSE: 158 MS
QRS DURATION - MUSE: 72 MS
QT - MUSE: 346 MS
QTC - MUSE: 423 MS
R AXIS - MUSE: 15 DEGREES
SYSTOLIC BLOOD PRESSURE - MUSE: NORMAL MMHG
T AXIS - MUSE: 44 DEGREES
VENTRICULAR RATE- MUSE: 90 BPM

## 2023-08-01 NOTE — TELEPHONE ENCOUNTER
Oral Chemotherapy Monitoring Program    Patient called in and reported recurrence of significant GERD symptoms due to discontinuation of pantoprazole.  It was discontinued due to potential drug interaction with his Nexavar.    Discussed with LILLIAN Bloom.    Our recommendations.   Restart pantoprazole at his usual dose.  In 1-2 weeks, attempt to lower the dose/frequency of the pantoprazole plus adding famotidine to help with symptoms.  Progressively decrease dose/frequency of pantoprazole, to find the minimum dose that will control his symptoms.    Patient agreed to this plan.    Samira Torres, PharmD, BCPS, [facility]  Oncology Clinical Pharmacy Specialist  [facility]  [phone]

## 2023-08-01 NOTE — TELEPHONE ENCOUNTER
Spoke with Miller.     He is having diarrhea and he stopped sorafenib. He was taking imodium 2 tablets every 6 hours and it did not help.    Pt otherwise feeling ok. He is walking 3 miles twice a day.    He is eating 3 meals a day.     His family is coming in from [place] today to see him and will stay for a week.     Art is coming in town on 8/19. He has to change his will and trust and has a meeting with the  with Art on 8/21.    He is trying to get things in order before things change.     Gabrielle, his caretaker, is bringing him to the transplant picnic.

## 2023-08-02 ENCOUNTER — OFFICE VISIT (OUTPATIENT)
Dept: OPHTHALMOLOGY | Facility: CLINIC | Age: 58
End: 2023-08-02
Attending: OPHTHALMOLOGY
Payer: MEDICARE

## 2023-08-02 DIAGNOSIS — Z95.1 S/P CABG (CORONARY ARTERY BYPASS GRAFT): ICD-10-CM

## 2023-08-02 DIAGNOSIS — E11.3593 PROLIFERATIVE DIABETIC RETINOPATHY OF BOTH EYES ASSOCIATED WITH TYPE 2 DIABETES MELLITUS, UNSPECIFIED PROLIFERATIVE RETINOPATHY TYPE (H): ICD-10-CM

## 2023-08-02 DIAGNOSIS — E11.3293 TYPE 2 DIABETES MELLITUS WITH MILD NONPROLIFERATIVE RETINOPATHY OF BOTH EYES WITHOUT MACULAR EDEMA, UNSPECIFIED WHETHER LONG TERM INSULIN USE (H): ICD-10-CM

## 2023-08-02 PROCEDURE — G0463 HOSPITAL OUTPT CLINIC VISIT: HCPCS | Mod: 25 | Performed by: OPHTHALMOLOGY

## 2023-08-02 PROCEDURE — 99214 OFFICE O/P EST MOD 30 MIN: CPT | Mod: 25 | Performed by: OPHTHALMOLOGY

## 2023-08-02 PROCEDURE — 92134 CPTRZ OPH DX IMG PST SGM RTA: CPT | Performed by: OPHTHALMOLOGY

## 2023-08-02 PROCEDURE — 67028 INJECTION EYE DRUG: CPT | Mod: RT | Performed by: OPHTHALMOLOGY

## 2023-08-02 PROCEDURE — 250N000011 HC RX IP 250 OP 636: Mod: JZ | Performed by: STUDENT IN AN ORGANIZED HEALTH CARE EDUCATION/TRAINING PROGRAM

## 2023-08-02 RX ORDER — INSULIN DEGLUDEC 100 U/ML
INJECTION, SOLUTION SUBCUTANEOUS
Qty: 45 ML | Refills: 3 | Status: SHIPPED | OUTPATIENT
Start: 2023-08-02 | End: 2023-09-20

## 2023-08-02 RX ADMIN — AFLIBERCEPT 2 MG: 40 INJECTION, SOLUTION INTRAVITREAL at 10:23

## 2023-08-02 ASSESSMENT — REFRACTION_WEARINGRX
OD_SPHERE: -0.50
OS_ADD: +2.75
OD_CYLINDER: +0.50
OD_ADD: +2.75
OS_CYLINDER: +0.25
OS_SPHERE: -0.50
OS_AXIS: 080
OD_AXIS: 180

## 2023-08-02 ASSESSMENT — VISUAL ACUITY
OD_CC+: +1
OD_CC: 20/25
METHOD: SNELLEN - LINEAR
CORRECTION_TYPE: GLASSES
OS_CC+: -2+1
OS_CC: 20/25

## 2023-08-02 ASSESSMENT — CONF VISUAL FIELD
METHOD: COUNTING FINGERS
OS_SUPERIOR_TEMPORAL_RESTRICTION: 0
OD_SUPERIOR_NASAL_RESTRICTION: 0
OD_NORMAL: 1
OS_SUPERIOR_NASAL_RESTRICTION: 0
OD_INFERIOR_NASAL_RESTRICTION: 0
OS_INFERIOR_TEMPORAL_RESTRICTION: 0
OS_INFERIOR_NASAL_RESTRICTION: 0
OD_INFERIOR_TEMPORAL_RESTRICTION: 0
OD_SUPERIOR_TEMPORAL_RESTRICTION: 0
OS_NORMAL: 1

## 2023-08-02 ASSESSMENT — TONOMETRY
IOP_METHOD: TONOPEN
OS_IOP_MMHG: 11
OD_IOP_MMHG: 9

## 2023-08-02 ASSESSMENT — EXTERNAL EXAM - RIGHT EYE: OD_EXAM: NORMAL

## 2023-08-02 ASSESSMENT — SLIT LAMP EXAM - LIDS
COMMENTS: NORMAL
COMMENTS: SMALL PAPILLOMA ALONG LL MARGIN, NON CONCERNING

## 2023-08-02 ASSESSMENT — CUP TO DISC RATIO
OD_RATIO: 0.3
OS_RATIO: 0.4

## 2023-08-02 ASSESSMENT — EXTERNAL EXAM - LEFT EYE: OS_EXAM: PERIOCULAR ECCHYMOSIS

## 2023-08-02 NOTE — NURSING NOTE
Chief Complaints and History of Present Illnesses   Patient presents with    Follow Up     5 week follow up Proliferative diabetic retinopathy of both eyes      Chief Complaint(s) and History of Present Illness(es)       Follow Up              Laterality: both eyes    Associated symptoms: dryness and itching.  Negative for eye pain    Treatments tried: artificial tears    Pain scale: 0/10    Comments: 5 week follow up Proliferative diabetic retinopathy of both eyes               Comments    Lost bifocal glasses, having difficulty reading lately.   No eye pain, but itching RE.  Dryness both eyes, uses AT PRN for relief.   Began oral chemo 4 days ago.     DM2  LBS : 200 this AM  Lab Results       Component                Value               Date                       A1C                      6.3                 06/14/2023                 A1C                      6.9                 12/14/2022                 A1C                      6.0                 01/10/2022                 A1C                      6.8                 07/13/2021                 A1C                      6.0                 12/30/2020                 A1C                      6.3                 06/13/2020                 A1C                      6.6                 08/08/2018                 A1C                      6.5                 06/09/2017                 A1C                      7.8                 10/25/2016               Sean Jb 9:21 AM August 2, 2023

## 2023-08-02 NOTE — PROGRESS NOTES
CC:  Follow up Diabetic retinopathy     Interval: started chemo this week for GI Ca.   Here for 4 week follow-up of Type 2 diabetes mellitus with PDR both eyes and macular edema.     He has had history of sinus issues worsening this year with allergy season. VA subjectively unchanged, no flashes, no floaters. Last visit he received a left eye injection only.  He is working on walking more which is helping with weight loss.     Lab Results   Component                Value               Date                       A1C                      6.3                 6/14/2023       HPI: Frandy Workman is a 58 year old patient status post Cataract extraction intraocular lens left eye   history of Diabetes mellitus for 24 ys . Patient on insulin.    Interval History: s/p CEIOL left eye 1/3/22    Retinal Imaging:  OCT 08/02/23   RE: decreased of central DME compared to last visit.    LE: No IRF/SRF, stable.    fluorescein angiography 03/29/23 both eyes normal AV and choroidal filling ou. Staining laser scars. No obvious NVE, mild late macula leakage. peripheral leakage and capillary non perfusion.     Optos consistent with clinical exam    Assessment & Plan:    #T2DM   - HbA1c 6.3% (6/2023)  - Blood pressure (<120/80) and blood glucose (HbA1c <7.0, ~6.5 today) control discussed with patient. Patient advised that failure to adequately control each may lead to vision loss. The patient expressed understanding.      # Proliferative diabetic retinopathy left eye; pre-proliferative diabetic retinopathy right eye    # vitreous hemorrhage left eye 10/12/22   Status post Eylea inj left eye   Status post Panretinal laser photocoagulation (PRP) 9.28.22 left eye and Status post scatter PRP right eye 11/02/22       # Diabetic macular edema both eyes   - status post avastin in both eyes; Switch to Eylea 4/24/19 with improvement of Diabetic macular edema     - Tried holding off on injection 3/29/23, but edema worsened both eyes  - last Eylea  right eye 6.28.23  08/02/23 interval improvement   - Plan for Eylea right eye today  will T&E  vs. Switch to Vabysmo vs ozurdex in the future    # status post Cataract extraction intraocular lens both eyes   Right eye: 01/24/23; left eye 01/3/23  -IOP WNL today  Retina detachment and endophthalmitis precautions were discussed with the patient (increased blurry vision, drainage, new flashes, floaters or a curtain in the visual field) and was asked to return if any of the those occur    # Dry eye syndrome   Left eye worse than right eye   warm compresses and artificial tears  As needed  - punctal plugs previously left eye - reports this is better     # Status post liver transplant  - tx 11/2019  - severe anemia; s/p transfusion - anemia much improved   - being seen by his primary team      PLAN:  - Eylea right eye today 08/02/23  - will T&E  - plan to observe closely left eye - Diabetic macular edema currently resolved. - could treat as needed  - Follow up in 6 weeks with Optical Coherence Tomography, with  dilation and fluorescein angiography transits right eye.   possible Eylea vs. Vabysmo inj both eyes     Consider changing Eylea to Vabysmo or ozurdex inj if recurrence of intraretinal fluid     ~~~~~~~~~~~~~~~~~~~~~~~~~~~~~~~~~~   Complete documentation of historical and exam elements from today's encounter can be found in the full encounter summary report (not reduplicated in this progress note).  I personally obtained the chief complaint(s) and history of present illness.  I confirmed and edited as necessary the review of systems, past medical/surgical history, family history, social history, and examination findings as documented by others; and I examined the patient myself.  I personally reviewed the relevant tests, images, and reports as documented above.  I formulated and edited as necessary the assessment and plan and discussed the findings and management plan with the patient and family and No resident or  fellow assisted with the procedures performed.  I performed the procedures myself.    Enriqueta Martin MD   of Ophthalmology.  Retina Service   Department of Ophthalmology and Visual Neurosciences   [facility]  Phone: [phone]   Fax: [phone]

## 2023-08-03 ASSESSMENT — PATIENT HEALTH QUESTIONNAIRE - PHQ9
SUM OF ALL RESPONSES TO PHQ QUESTIONS 1-9: 3
10. IF YOU CHECKED OFF ANY PROBLEMS, HOW DIFFICULT HAVE THESE PROBLEMS MADE IT FOR YOU TO DO YOUR WORK, TAKE CARE OF THINGS AT HOME, OR GET ALONG WITH OTHER PEOPLE: VERY DIFFICULT
SUM OF ALL RESPONSES TO PHQ QUESTIONS 1-9: 3

## 2023-08-04 ENCOUNTER — VIRTUAL VISIT (OUTPATIENT)
Dept: BEHAVIORAL HEALTH | Facility: CLINIC | Age: 58
End: 2023-08-04
Payer: MEDICARE

## 2023-08-04 DIAGNOSIS — F31.31 BIPOLAR 1 DISORDER, DEPRESSED, MILD (H): Primary | ICD-10-CM

## 2023-08-04 PROCEDURE — 90834 PSYTX W PT 45 MINUTES: CPT | Mod: VID | Performed by: PSYCHOLOGIST
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
## 2023-08-07 ENCOUNTER — TELEPHONE (OUTPATIENT)
Dept: ONCOLOGY | Facility: CLINIC | Age: 58
End: 2023-08-07
Payer: MEDICARE

## 2023-08-07 DIAGNOSIS — C22.0 HEPATOCELLULAR CARCINOMA (H): Primary | ICD-10-CM

## 2023-08-07 DIAGNOSIS — Z79.899 ENCOUNTER FOR LONG-TERM (CURRENT) USE OF MEDICATIONS: ICD-10-CM

## 2023-08-07 NOTE — ORAL ONC MGMT
"Oral Chemotherapy Monitoring Program    Subjective/Objective:  Frandy Workman is a 58 year old male contacted by phone for a follow-up visit for oral chemotherapy. Miller confirms his current dosing of 400 mg (2 x 200 mg tabs) BID on an empty stomach. He has not missed any doses thus far. No new medications.     Miller is not experiencing any diarrhea or nausea thus far. He did have some pretty bad rebound GERD after stopping pantoprazole (cold turkey per his request), he has since restarted this and plan to cut down to 20 mg daily starting this week and we will re-assess with him next week how he is doing and plans for PRN pepcid/TUMS.    Miller is noticing some severe cramps at night on his shin, he rubs his feet/shins and after ~30 min it goes away. He is not losing sleep yet related to this. He will bring this up with Ashli during his visit. \"Muscle spasms\" are reported to happen in ~10% of patients. He is noticing a slight decrease in his appetite in the evening. He is eating smaller portions throughout the day. No concerns for weight loss at this time.    Miller did check his BP on Friday and it was 108/65.        7/21/2023     4:00 PM 7/28/2023    12:00 PM 7/28/2023     2:00 PM 8/1/2023     9:00 AM 8/7/2023     9:00 AM   ORAL CHEMOTHERAPY   Assessment Type Initial Work up Left Voicemail Refill;New Teach Incoming phone call Initial Follow up   Diagnosis Code Hepatocellular Cancer Hepatocellular Cancer Hepatocellular Cancer Hepatocellular Cancer    Providers Dr Cherelle Villarreal   [facility]   Is this patient followed by the [facility] team? No No No No No   Drug Name Nexavar (sorafenib) Nexavar (sorafenib) Nexavar (sorafenib) Nexavar (sorafenib) Nexavar (sorafenib)   Dose 400 mg  400 mg 400 mg 400 mg   Current Schedule BID  BID BID BID   Cycle Details Continuous  Continuous Continuous Continuous   Start Date of Last Cycle    7/29/2023 " "7/29/2023   Planned next cycle start date   7/31/2023     Doses missed in last 2 weeks     0   Adherence Assessment     Adherent   Adverse Effects    Other (See Note for Details) Other (See Note for Details);Decreased Appetite/Weight Loss   Decrease Appetite/Weight Loss     Grade 1   Pharmacist Intervention(decreased appetite/weight loss)     No   Other (See Note for Details)    GERD Muscle cramps in shin at bedtime   Pharmacist intervention(other)    Yes No   Home BPs     all BPs<140/90   Any new drug interactions? Yes  Yes  No   Pharmacist Intervention? Yes  Yes     Intervention(s) Patient Education  Patient Education     Is the dose as ordered appropriate for the patient? Yes  Yes         Last PHQ-2 Score on record:       7/24/2023    11:09 AM 5/31/2023    10:28 AM   PHQ-2 ( 1999 [employer])   Q1: Little interest or pleasure in doing things 0 0   Q2: Feeling down, depressed or hopeless 0 0   PHQ-2 Score 0 0   PHQ-2 Total Score (12-17 Years)- Positive if 3 or more points; Administer PHQ-A if positive 0 0       Vitals:  BP:   BP Readings from Last 1 Encounters:   07/20/23 118/68     Wt Readings from Last 1 Encounters:   07/26/23 88.5 kg (195 lb)     Estimated body surface area is 2.07 meters squared as calculated from the following:    Height as of 7/26/23: 1.74 m (5' 8.5\").    Weight as of 7/26/23: 88.5 kg (195 lb).    Labs:  _  Result Component Current Result Ref Range   Sodium 130 (L) (7/28/2023) 136 - 145 mmol/L     _  Result Component Current Result Ref Range   Potassium 5.5 (H) (7/28/2023) 3.4 - 5.3 mmol/L     _  Result Component Current Result Ref Range   Calcium 9.1 (7/28/2023) 8.6 - 10.0 mg/dL     _  Result Component Current Result Ref Range   Magnesium 2.2 (7/28/2023) 1.7 - 2.3 mg/dL     _  Result Component Current Result Ref Range   Phosphorus 3.4 (7/28/2023) 2.5 - 4.5 mg/dL     _  Result Component Current Result Ref Range   Albumin 4.7 (7/28/2023) 3.5 - 5.2 g/dL     _  Result Component Current Result Ref " Range   Urea Nitrogen 56.8 (H) (7/28/2023) 8.0 - 23.0 mg/dL     _  Result Component Current Result Ref Range   Creatinine 2.46 (H) (7/28/2023) 0.67 - 1.17 mg/dL     _  Result Component Current Result Ref Range   AST 23 (7/28/2023) 0 - 45 U/L     _  Result Component Current Result Ref Range   ALT 25 (7/28/2023) 0 - 70 U/L     _  Result Component Current Result Ref Range   Bilirubin Total 0.4 (7/28/2023) <=1.2 mg/dL     _  Result Component Current Result Ref Range   WBC Count 8.8 (7/28/2023) 4.0 - 11.0 10e3/uL     _  Result Component Current Result Ref Range   Hemoglobin 11.9 (L) (7/28/2023) 13.3 - 17.7 g/dL     _  Result Component Current Result Ref Range   Platelet Count 182 (7/28/2023) 150 - 450 10e3/uL     No results found for ANC within last 30 days.     _  Result Component Current Result Ref Range   Absolute Neutrophils 6.9 (7/28/2023) 1.6 - 8.3 10e3/uL      Assessment/Plan:  Miller is tolerating sorafenib therapy thus far, with his main concern relating to muscle cramp/spasm in his feet at night. He will bring this up with Ashli during his visit. Could consider magnesium supplementation, however unlikely his renal function and current levels would allow for daily supplementation with something like a magnesium oxide. Slow-mag (or XR magnesium) would be contraindicated d/t his poor renal function.    Follow-Up:  8/10 labs/Ashli visit.  8/24 labs (will have scheduling add on an EKG for this day as well)    Refill Due:  8/21/23    Pete Earl, PharmD, BCPS, BCOP  Hematology/Oncology Clinical Pharmacist  Oral Chemotherapy Monitoring Program  [facility]  [phone]

## 2023-08-07 NOTE — ORAL ONC MGMT
Oral Chemotherapy Monitoring Program     Placed call to patient in follow up of oral chemotherapy. Hoping to touch base with Miller for an initial assessment of his sorafenib.     Left message requesting call back. No drug names were mentioned. Will update when response received.     Pete Earl, PharmD, BCPS, [facility]  Hematology/Oncology Clinical Pharmacist  [facility]  [phone]

## 2023-08-09 ENCOUNTER — OFFICE VISIT (OUTPATIENT)
Dept: FAMILY MEDICINE | Facility: CLINIC | Age: 58
End: 2023-08-09
Payer: MEDICARE

## 2023-08-09 ENCOUNTER — TELEPHONE (OUTPATIENT)
Dept: ONCOLOGY | Facility: CLINIC | Age: 58
End: 2023-08-09
Payer: MEDICARE

## 2023-08-09 VITALS
HEIGHT: 69 IN | HEART RATE: 82 BPM | WEIGHT: 187.2 LBS | OXYGEN SATURATION: 85 % | DIASTOLIC BLOOD PRESSURE: 69 MMHG | BODY MASS INDEX: 27.73 KG/M2 | SYSTOLIC BLOOD PRESSURE: 107 MMHG

## 2023-08-09 DIAGNOSIS — C78.6 MALIGNANT NEOPLASM METASTATIC TO PERITONEUM (H): ICD-10-CM

## 2023-08-09 DIAGNOSIS — R11.2 NAUSEA AND VOMITING, UNSPECIFIED VOMITING TYPE: Primary | ICD-10-CM

## 2023-08-09 PROCEDURE — 99214 OFFICE O/P EST MOD 30 MIN: CPT | Performed by: FAMILY MEDICINE

## 2023-08-09 RX ORDER — ONDANSETRON 8 MG/1
8 TABLET, ORALLY DISINTEGRATING ORAL EVERY 8 HOURS PRN
Qty: 15 TABLET | Refills: 1 | Status: SHIPPED | OUTPATIENT
Start: 2023-08-09 | End: 2023-09-29

## 2023-08-09 NOTE — ORAL ONC MGMT
Miller called in to the oral chemotherapy team to report some new developments in side effects.  Reports having thrown up in the afternoon yesterday, 8/8, well after taking their Nexavar dose.  Then reports significant diarrhea last night.  Miller then reports having thrown up again this morning, 8/9.  Significant development in nausea.    Miller has an appointment tomorrow with oncology, informed patient to inform provider today about development in symptoms.  Will reach out to care team to inform and find solution - Miller reports Compazine not efficacious in preventing vomiting.    Thank you,  Iglesia Carter, PharmD  Oral Chemotherapy Monitoring Program  [phone]

## 2023-08-09 NOTE — PROGRESS NOTES
"  Assessment & Plan     Nausea and vomiting, unspecified vomiting type  I've messaged Onco team who sees him, w labs, tmrw. Likely chemo SE. To ER prn before.  - ondansetron (ZOFRAN ODT) 8 MG ODT tab; Take 1 tablet (8 mg) by mouth every 8 hours as needed for nausea      33 minutes spent by me on the date of the encounter doing chart review, history and exam, documentation and further activities per the note    BMI:   Estimated body mass index is 27.57 kg/m  as calculated from the following:    Height as of this encounter: 1.755 m (5' 9.09\").    Weight as of this encounter: 84.9 kg (187 lb 3.2 oz).       No follow-ups on file.    Lamin Ortiz MD  [facility] [place]    Nathaly Bhatt is a 58 year old, presenting for the following health issues:  RECHECK (Medication review)    HPI Here for a new issue  Recent started oral chemo; sees Oncology tmrw. Metastatic liver cancer.   Had one episode vomit, self limited, one episode diarrhea, self limited, since the chemo began, before last night. Overnight about eight hours ago had diarrhea and vomiting, for about an hour. No blood or melena seen. No fever. Is fatigued. Overall appetite reduced; currently drinking water easily but is still nauseated. No sick contacts.   Did not have these sx before on this oral chemo.  Here w/ attendant.  Past Medical History:   Diagnosis Date    Anemia 2013    Arthritis     BPH (benign prostatic hyperplasia)     CAD (coronary artery disease) 04/01/2019    Cholelithiasis     Conductive hearing loss 08/16/2017    Depressive disorder 1986    Suffer effects throughout life    Gastroesophageal reflux disease 12/01/2014    HCC (hepatocellular carcinoma) (H) 01/22/2019    History of diabetic retinopathy 07/2018    HTN (hypertension) 11/20/2019    Hyperlipidemia     Liver cirrhosis secondary to ESTRADA (H)     Liver transplanted (H) 11/11/2019    Portal vein thrombosis 04/11/2019    Type II diabetes mellitus (H)  "     Past Surgical History:   Procedure Laterality Date    BYPASS GRAFT ARTERY CORONARY N/A 7/14/2021    Procedure: median sternotomy, on cardiopulmonary bypass, CORONARY ARTERY BYPASS GRAFT (CABG) x2 with left greater saphenous vein endoscopic harvest and left internal mammery artery harvest;  Surgeon: Tom Zapata MD;  Location: [facility] OR    COLONOSCOPY      2015    COLONOSCOPY N/A 12/6/2019    Procedure: COLONOSCOPY, WITH POLYPECTOMY AND BIOPSY;  Surgeon: Adam Morton MD;  Location: [facility] GI    CV CENTRAL VENOUS CATHETER PLACEMENT N/A 7/12/2021    Procedure: Central Venous Catheter Placement;  Surgeon: Fermin Polanco MD;  Location:  [facility] CARD[facility] CATH LAB    CV CORONARY ANGIOGRAM N/A 7/12/2021    Procedure: Coronary Angiogram;  Surgeon: Fermin Polanco MD;  Location:  [facility]ARD[facility] CATH [facility]    CV HEART CATHETERIZATION WITH POSSIBLE INTERVENTION N/A 2/26/2019    Procedure: CORS;  Surgeon: Jagdish Hoyt MD;  Location:  HE[facility]ARD[facility] CATH LAB    CV INTRA AORTIC BALLOON N/A 7/12/2021    Procedure: Intra Aortic Balloon Pump Insertion;  Surgeon: Fermin Polanco MD;  Location:  HEART CARDIAC CATH LAB    ESOPHAGOSCOPY, GASTROSCOPY, DUODENOSCOPY (EGD), COMBINED N/A 11/17/2016    Procedure: COMBINED ESOPHAGOSCOPY, GASTROSCOPY, DUODENOSCOPY (EGD);  Surgeon: Santi Rosas MD;  [facility]: [facility]    ESOPHAGOSCOPY, GASTROSCOPY, DUODENOSCOPY (EGD), COMBINED N/A 11/17/2017    Procedure: COMBINED ESOPHAGOSCOPY, GASTROSCOPY, DUODENOSCOPY (EGD);  EGD;  Surgeon: Santi Rosas MD;  [facility]    ESOPHAGOSCOPY, GASTROSCOPY, DUODENOSCOPY (EGD), COMBINED N/A 12/28/2018    Procedure: EGD;  Surgeon: Santi Rosas MD;  Location:  OR    ESOPHAGOSCOPY, GASTROSCOPY, DUODENOSCOPY (EGD), COMBINED N/A 12/6/2019    Procedure: ESOPHAGOGASTRODUODENOSCOPY, WITH BIOPSY;  Surgeon: Adam Morton MD;  Location:  GI    ESOPHAGOSCOPY,  GASTROSCOPY, DUODENOSCOPY (EGD), COMBINED N/A 2020    Procedure: ESOPHAGOGASTRODUODENOSCOPY (EGD);  Surgeon: Santi Rosas MD;  Location: [facility]    EXCISE MASS ABDOMEN N/A 2023    Procedure: Laparoscopic lysis of adhesions, laparoscopic resection of abdominal mass;  Surgeon: Ruiz Chu MD;  Location: [facility]    HEAD & NECK SURGERY      2017 at [facility].     IMPLANT GOLD WEIGHT EYELID Right 2017    Procedure: IMPLANT WEIGHT EYELID;  Right Upper Eyelid Weight, right tarsal strip lower eyelid;  Surgeon: Milana Malave MD;  [facility]    IR CHEMO EMBOLIZATION  2019    KNEE SURGERY Left     ORTHOPEDIC SURGERY      PAROTIDECTOMY, RADICAL NECK DISSECTION Right 2017    Procedure: PAROTIDECTOMY, RADICAL NECK DISSECTION;  Right Superfacial Parotidectomy , Facial nerve repair. with NIM facial nerve monitor.;  Surgeon: Asiya Morgan MD;  [facility]: [facility]    PHACOEMULSIFICATION CLEAR CORNEA WITH STANDARD INTRAOCULAR LENS IMPLANT Right 2023    Procedure: PHACOEMULSIFICATION, COMPLEX CATARACT, WITH INTRAOCULAR LENS IMPLANT WITH TRYPAN RIGHT EYE;  Surgeon: Enriqueta Martin MD;  Location: [facility] OR    PHACOEMULSIFICATION WITH STANDARD INTRAOCULAR LENS IMPLANT Left 1/3/2023    Procedure: PHACOEMULSIFICATION, COMPLEX CATARACT, WITH STANDARD INTRAOCULAR LENS IMPLANT INSERTION LEFT EYE;  Surgeon: Enriqueta Martin MD;  Location: [facility] OR    PICC INSERTION Left 2017    4fr SL BioFlo PICC, 44cm in the L basilic vein w/ tip in the low SVC    RETURN LIVER TRANSPLANT N/A 2019    Procedure: Exploratory laparotomy, hematoma evacuation, abdominal washout;  Surgeon: Александр Ramos MD;  Location: [facility]    TRANSPLANT LIVER RECIPIENT  DONOR N/A 2019    Procedure: TRANSPLANT, LIVER, RECIPIENT,  DONOR;  Surgeon: Александр Ramos MD;  Location: [facility] OR    VASCULAR SURGERY       Current Outpatient Medications   Medication    acetaminophen  (TYLENOL) 325 MG tablet    Alcohol Swabs (ALCOHOL PREP) 70 % PADS    ammonium lactate (AMLACTIN) 12 % external cream    aspirin (SM ASPIRIN ADULT LOW STRENGTH) 81 MG EC tablet    BD VIKTORIA U/F 32G X 4 MM insulin pen needle    benzoyl peroxide 5 % external liquid    Continuous Blood Gluc Sensor (FREESTYLE CLAUDIA 2 SENSOR) MISC    empagliflozin (JARDIANCE) 10 MG TABS tablet    insulin aspart (NOVOLOG PEN) 100 UNIT/ML pen    insulin degludec (TRESIBA FLEXTOUCH) 100 UNIT/ML pen    ketoconazole (NIZORAL) 2 % external shampoo    lamiVUDine (EPIVIR) 100 MG tablet    lisinopril (ZESTRIL) 10 MG tablet    metFORMIN (GLUCOPHAGE XR) 500 MG 24 hr tablet    metoprolol succinate ER (TOPROL XL) 25 MG 24 hr tablet    Misc Natural Products (NEURIVA PO)    Multiple Vitamin (TAB-A-GLADIS) TABS    mycophenolate (GENERIC EQUIVALENT) 250 MG capsule    ondansetron (ZOFRAN ODT) 8 MG ODT tab    pantoprazole (PROTONIX) 40 MG EC tablet    predniSONE (DELTASONE) 5 MG tablet    rosuvastatin (CRESTOR) 20 MG tablet    SORAfenib (NEXAVAR) 200 MG tablet    tamsulosin (FLOMAX) 0.4 MG capsule    Vitamin D3 50 mcg (2000 units) tablet    order for DME    oxyCODONE (ROXICODONE) 5 MG tablet    pseudoePHEDrine (SUDAFED) 60 MG tablet     Current Facility-Administered Medications   Medication    aflibercept (EYLEA) injection prefilled syringe 2 mg    aflibercept (EYLEA) injection prefilled syringe 2 mg    dexamethasone (OZURDEX) intravitreal implant 0.7 mg    dexamethasone (OZURDEX) intravitreal implant 0.7 mg    faricimab-svoa (VABYSMO) intravitreal injection 6 mg    faricimab-svoa (VABYSMO) intravitreal injection 6 mg    lidocaine (PF) (XYLOCAINE) injection 1 mL   .  Allergies   Allergen Reactions    Codeine Other (See Comments)     Cannot take due to liver  Cannot tolerate oral narcotics    Seasonal Allergies      Sneezing, coughing, runny and itchy eyes             Review of Systems         Objective    /69 (BP Location: Right arm, Patient Position:  "Sitting, Cuff Size: Adult Regular)   Pulse 82   Ht 1.755 m (5' 9.09\")   Wt 84.9 kg (187 lb 3.2 oz)   SpO2 (!) 85%   BMI 27.57 kg/m    Body mass index is 27.57 kg/m .  Physical Exam   GENERAL: healthy, alert and no distress  NECK: no adenopathy, no asymmetry, masses, or scars and thyroid normal to palpation  RESP: lungs clear to auscultation - no rales, rhonchi or wheezes  CV: regular rate and rhythm, normal S1 S2, no S3 or S4, no murmur, click or rub, no peripheral edema and peripheral pulses strong  ABDOMEN: soft, nontender, no hepatosplenomegaly, no masses and bowel sounds normal  MS: no gross musculoskeletal defects noted, no edema                  "

## 2023-08-10 ENCOUNTER — APPOINTMENT (OUTPATIENT)
Dept: CT IMAGING | Facility: CLINIC | Age: 58
DRG: 682 | End: 2023-08-10
Attending: EMERGENCY MEDICINE
Payer: MEDICARE

## 2023-08-10 ENCOUNTER — APPOINTMENT (OUTPATIENT)
Dept: GENERAL RADIOLOGY | Facility: CLINIC | Age: 58
DRG: 682 | End: 2023-08-10
Payer: MEDICARE

## 2023-08-10 ENCOUNTER — VIRTUAL VISIT (OUTPATIENT)
Dept: ONCOLOGY | Facility: CLINIC | Age: 58
DRG: 682 | End: 2023-08-10
Attending: INTERNAL MEDICINE
Payer: MEDICARE

## 2023-08-10 ENCOUNTER — HOSPITAL ENCOUNTER (INPATIENT)
Facility: CLINIC | Age: 58
LOS: 6 days | Discharge: HOME | DRG: 682 | End: 2023-08-16
Attending: EMERGENCY MEDICINE
Payer: MEDICARE

## 2023-08-10 ENCOUNTER — APPOINTMENT (OUTPATIENT)
Dept: GENERAL RADIOLOGY | Facility: CLINIC | Age: 58
DRG: 682 | End: 2023-08-10
Attending: EMERGENCY MEDICINE
Payer: MEDICARE

## 2023-08-10 ENCOUNTER — LAB (OUTPATIENT)
Dept: LAB | Facility: CLINIC | Age: 58
End: 2023-08-10
Payer: MEDICARE

## 2023-08-10 ENCOUNTER — TELEPHONE (OUTPATIENT)
Dept: NEPHROLOGY | Facility: CLINIC | Age: 58
End: 2023-08-10

## 2023-08-10 ENCOUNTER — NURSE TRIAGE (OUTPATIENT)
Dept: ONCOLOGY | Facility: CLINIC | Age: 58
End: 2023-08-10

## 2023-08-10 VITALS
HEIGHT: 69 IN | SYSTOLIC BLOOD PRESSURE: 110 MMHG | DIASTOLIC BLOOD PRESSURE: 85 MMHG | WEIGHT: 182 LBS | BODY MASS INDEX: 26.96 KG/M2

## 2023-08-10 DIAGNOSIS — Z94.4 STATUS POST LIVER TRANSPLANTATION (H): ICD-10-CM

## 2023-08-10 DIAGNOSIS — C22.0 HCC (HEPATOCELLULAR CARCINOMA) (H): Primary | ICD-10-CM

## 2023-08-10 DIAGNOSIS — N17.9 ACUTE RENAL FAILURE, UNSPECIFIED ACUTE RENAL FAILURE TYPE (H): ICD-10-CM

## 2023-08-10 DIAGNOSIS — C22.0 HCC (HEPATOCELLULAR CARCINOMA) (H): ICD-10-CM

## 2023-08-10 DIAGNOSIS — Z94.4 LIVER REPLACED BY TRANSPLANT (H): ICD-10-CM

## 2023-08-10 DIAGNOSIS — N17.9 AKI (ACUTE KIDNEY INJURY) (H): Primary | ICD-10-CM

## 2023-08-10 DIAGNOSIS — B19.10 HEPATITIS B: ICD-10-CM

## 2023-08-10 DIAGNOSIS — E87.5 HYPERKALEMIA: ICD-10-CM

## 2023-08-10 DIAGNOSIS — Z79.899 ENCOUNTER FOR LONG-TERM (CURRENT) USE OF MEDICATIONS: ICD-10-CM

## 2023-08-10 DIAGNOSIS — D69.6 THROMBOCYTOPENIA (H): ICD-10-CM

## 2023-08-10 LAB
ALBUMIN SERPL BCG-MCNC: 3.4 G/DL (ref 3.5–5.2)
ALBUMIN SERPL BCG-MCNC: 4.3 G/DL (ref 3.5–5.2)
ALBUMIN SERPL BCG-MCNC: 4.4 G/DL (ref 3.5–5.2)
ALP SERPL-CCNC: 108 U/L (ref 40–129)
ALP SERPL-CCNC: 110 U/L (ref 40–129)
ALP SERPL-CCNC: 84 U/L (ref 40–129)
ALT SERPL W P-5'-P-CCNC: 16 U/L (ref 0–70)
ALT SERPL W P-5'-P-CCNC: 26 U/L (ref 0–70)
ALT SERPL W P-5'-P-CCNC: 27 U/L (ref 0–70)
AMMONIA PLAS-SCNC: 19 UMOL/L (ref 16–60)
ANION GAP SERPL CALCULATED.3IONS-SCNC: 16 MMOL/L (ref 7–15)
ANION GAP SERPL CALCULATED.3IONS-SCNC: 18 MMOL/L (ref 7–15)
ANION GAP SERPL CALCULATED.3IONS-SCNC: 18 MMOL/L (ref 7–15)
AST SERPL W P-5'-P-CCNC: 22 U/L (ref 0–45)
AST SERPL W P-5'-P-CCNC: 24 U/L (ref 0–45)
AST SERPL W P-5'-P-CCNC: 25 U/L (ref 0–45)
B-OH-BUTYR SERPL-SCNC: 2.1 MMOL/L
BASE EXCESS BLDV CALC-SCNC: -16.4 MMOL/L (ref -7.7–1.9)
BASOPHILS # BLD AUTO: 0 10E3/UL (ref 0–0.2)
BASOPHILS NFR BLD AUTO: 0 %
BILIRUB DIRECT SERPL-MCNC: <0.2 MG/DL (ref 0–0.3)
BILIRUB SERPL-MCNC: 0.2 MG/DL
BILIRUB SERPL-MCNC: 0.3 MG/DL
BILIRUB SERPL-MCNC: 0.4 MG/DL
BUN SERPL-MCNC: 105 MG/DL (ref 8–23)
BUN SERPL-MCNC: 107 MG/DL (ref 8–23)
BUN SERPL-MCNC: 117 MG/DL (ref 8–23)
CA-I BLD-MCNC: 4.6 MG/DL (ref 4.4–5.2)
CA-I BLD-MCNC: 4.9 MG/DL (ref 4.4–5.2)
CALCIUM SERPL-MCNC: 7.9 MG/DL (ref 8.6–10)
CALCIUM SERPL-MCNC: 8.6 MG/DL (ref 8.6–10)
CALCIUM SERPL-MCNC: 9.1 MG/DL (ref 8.6–10)
CHLORIDE SERPL-SCNC: 112 MMOL/L (ref 98–107)
CHLORIDE SERPL-SCNC: 112 MMOL/L (ref 98–107)
CHLORIDE SERPL-SCNC: 116 MMOL/L (ref 98–107)
CPB POCT: NO
CREAT SERPL-MCNC: 6.5 MG/DL (ref 0.67–1.17)
CREAT SERPL-MCNC: 6.77 MG/DL (ref 0.67–1.17)
CREAT SERPL-MCNC: 7.38 MG/DL (ref 0.67–1.17)
DEPRECATED HCO3 PLAS-SCNC: 8 MMOL/L (ref 22–29)
DEPRECATED HCO3 PLAS-SCNC: 9 MMOL/L (ref 22–29)
DEPRECATED HCO3 PLAS-SCNC: 9 MMOL/L (ref 22–29)
EOSINOPHIL # BLD AUTO: 0.1 10E3/UL (ref 0–0.7)
EOSINOPHIL NFR BLD AUTO: 1 %
ERYTHROCYTE [DISTWIDTH] IN BLOOD BY AUTOMATED COUNT: 13.8 % (ref 10–15)
ERYTHROCYTE [DISTWIDTH] IN BLOOD BY AUTOMATED COUNT: 13.8 % (ref 10–15)
ERYTHROCYTE [DISTWIDTH] IN BLOOD BY AUTOMATED COUNT: 14 % (ref 10–15)
GFR SERPL CREATININE-BSD FRML MDRD: 8 ML/MIN/1.73M2
GFR SERPL CREATININE-BSD FRML MDRD: 9 ML/MIN/1.73M2
GFR SERPL CREATININE-BSD FRML MDRD: 9 ML/MIN/1.73M2
GLUCOSE BLD-MCNC: 122 MG/DL (ref 70–99)
GLUCOSE BLDC GLUCOMTR-MCNC: 110 MG/DL (ref 70–99)
GLUCOSE BLDC GLUCOMTR-MCNC: 86 MG/DL (ref 70–99)
GLUCOSE BLDC GLUCOMTR-MCNC: 89 MG/DL (ref 70–99)
GLUCOSE BLDC GLUCOMTR-MCNC: 97 MG/DL (ref 70–99)
GLUCOSE SERPL-MCNC: 113 MG/DL (ref 70–99)
GLUCOSE SERPL-MCNC: 130 MG/DL (ref 70–99)
GLUCOSE SERPL-MCNC: 180 MG/DL (ref 70–99)
HCO3 BLDV-SCNC: 12 MMOL/L (ref 21–28)
HCO3 BLDV-SCNC: 9 MMOL/L (ref 21–28)
HCO3 BLDV-SCNC: 9 MMOL/L (ref 21–28)
HCT VFR BLD AUTO: 33.9 % (ref 40–53)
HCT VFR BLD AUTO: 39.9 % (ref 40–53)
HCT VFR BLD AUTO: 42.4 % (ref 40–53)
HCT VFR BLD CALC: 35 % (ref 40–53)
HGB BLD-MCNC: 10.3 G/DL (ref 13.3–17.7)
HGB BLD-MCNC: 11.9 G/DL (ref 13.3–17.7)
HGB BLD-MCNC: 11.9 G/DL (ref 13.3–17.7)
HGB BLD-MCNC: 13.3 G/DL (ref 13.3–17.7)
IMM GRANULOCYTES # BLD: 0.1 10E3/UL
IMM GRANULOCYTES NFR BLD: 1 %
INR PPP: 1.16 (ref 0.85–1.15)
LACTATE BLD-SCNC: 0.6 MMOL/L
LIPASE SERPL-CCNC: 797 U/L (ref 13–60)
LYMPHOCYTES # BLD AUTO: 1.1 10E3/UL (ref 0.8–5.3)
LYMPHOCYTES NFR BLD AUTO: 13 %
MAGNESIUM SERPL-MCNC: 2.1 MG/DL (ref 1.7–2.3)
MAGNESIUM SERPL-MCNC: 2.3 MG/DL (ref 1.7–2.3)
MAGNESIUM SERPL-MCNC: 2.3 MG/DL (ref 1.7–2.3)
MCH RBC QN AUTO: 30.4 PG (ref 26.5–33)
MCH RBC QN AUTO: 30.7 PG (ref 26.5–33)
MCH RBC QN AUTO: 31.3 PG (ref 26.5–33)
MCHC RBC AUTO-ENTMCNC: 29.8 G/DL (ref 31.5–36.5)
MCHC RBC AUTO-ENTMCNC: 30.4 G/DL (ref 31.5–36.5)
MCHC RBC AUTO-ENTMCNC: 31.4 G/DL (ref 31.5–36.5)
MCV RBC AUTO: 100 FL (ref 78–100)
MCV RBC AUTO: 101 FL (ref 78–100)
MCV RBC AUTO: 102 FL (ref 78–100)
MONOCYTES # BLD AUTO: 0.5 10E3/UL (ref 0–1.3)
MONOCYTES NFR BLD AUTO: 6 %
NEUTROPHILS # BLD AUTO: 7.1 10E3/UL (ref 1.6–8.3)
NEUTROPHILS NFR BLD AUTO: 79 %
NRBC # BLD AUTO: 0 10E3/UL
NRBC BLD AUTO-RTO: 0 /100
O2/TOTAL GAS SETTING VFR VENT: 0 %
PCO2 BLDV: 29 MM HG (ref 40–50)
PCO2 BLDV: 31 MM HG (ref 40–50)
PCO2 BLDV: 36 MM HG (ref 40–50)
PH BLDV: 7.07 [PH] (ref 7.32–7.43)
PH BLDV: 7.09 [PH] (ref 7.32–7.43)
PH BLDV: 7.12 [PH] (ref 7.32–7.43)
PHOSPHATE SERPL-MCNC: 6 MG/DL (ref 2.5–4.5)
PLATELET # BLD AUTO: 120 10E3/UL (ref 150–450)
PLATELET # BLD AUTO: 164 10E3/UL (ref 150–450)
PLATELET # BLD AUTO: 178 10E3/UL (ref 150–450)
PO2 BLDV: 25 MM HG (ref 25–47)
PO2 BLDV: 28 MM HG (ref 25–47)
PO2 BLDV: 30 MM HG (ref 25–47)
POTASSIUM BLD-SCNC: 7.7 MMOL/L (ref 3.4–5.3)
POTASSIUM SERPL-SCNC: 5.9 MMOL/L (ref 3.4–5.3)
POTASSIUM SERPL-SCNC: 6.3 MMOL/L (ref 3.4–5.3)
POTASSIUM SERPL-SCNC: 8 MMOL/L (ref 3.4–5.3)
PROCALCITONIN SERPL IA-MCNC: 0.35 NG/ML
PROT SERPL-MCNC: 6 G/DL (ref 6.4–8.3)
PROT SERPL-MCNC: 7.3 G/DL (ref 6.4–8.3)
PROT SERPL-MCNC: 7.6 G/DL (ref 6.4–8.3)
RBC # BLD AUTO: 3.35 10E6/UL (ref 4.4–5.9)
RBC # BLD AUTO: 3.92 10E6/UL (ref 4.4–5.9)
RBC # BLD AUTO: 4.25 10E6/UL (ref 4.4–5.9)
SAO2 % BLDV: 27 % (ref 94–100)
SAO2 % BLDV: 38 % (ref 94–100)
SODIUM BLD-SCNC: 139 MMOL/L (ref 133–144)
SODIUM SERPL-SCNC: 138 MMOL/L (ref 136–145)
SODIUM SERPL-SCNC: 139 MMOL/L (ref 136–145)
SODIUM SERPL-SCNC: 141 MMOL/L (ref 136–145)
TROPONIN T SERPL HS-MCNC: 66 NG/L
WBC # BLD AUTO: 10.3 10E3/UL (ref 4–11)
WBC # BLD AUTO: 5.2 10E3/UL (ref 4–11)
WBC # BLD AUTO: 8.8 10E3/UL (ref 4–11)

## 2023-08-10 PROCEDURE — 93005 ELECTROCARDIOGRAM TRACING: CPT | Performed by: EMERGENCY MEDICINE

## 2023-08-10 PROCEDURE — 96361 HYDRATE IV INFUSION ADD-ON: CPT | Performed by: EMERGENCY MEDICINE

## 2023-08-10 PROCEDURE — G1010 CDSM STANSON: HCPCS

## 2023-08-10 PROCEDURE — 96375 TX/PRO/DX INJ NEW DRUG ADDON: CPT | Performed by: EMERGENCY MEDICINE

## 2023-08-10 PROCEDURE — 87040 BLOOD CULTURE FOR BACTERIA: CPT

## 2023-08-10 PROCEDURE — 84145 PROCALCITONIN (PCT): CPT | Performed by: EMERGENCY MEDICINE

## 2023-08-10 PROCEDURE — 258N000003 HC RX IP 258 OP 636: Performed by: EMERGENCY MEDICINE

## 2023-08-10 PROCEDURE — 82803 BLOOD GASES ANY COMBINATION: CPT

## 2023-08-10 PROCEDURE — 83735 ASSAY OF MAGNESIUM: CPT | Performed by: EMERGENCY MEDICINE

## 2023-08-10 PROCEDURE — 84484 ASSAY OF TROPONIN QUANT: CPT | Performed by: EMERGENCY MEDICINE

## 2023-08-10 PROCEDURE — 82962 GLUCOSE BLOOD TEST: CPT

## 2023-08-10 PROCEDURE — 36415 COLL VENOUS BLD VENIPUNCTURE: CPT | Performed by: EMERGENCY MEDICINE

## 2023-08-10 PROCEDURE — 99291 CRITICAL CARE FIRST HOUR: CPT | Mod: 25 | Performed by: EMERGENCY MEDICINE

## 2023-08-10 PROCEDURE — 82248 BILIRUBIN DIRECT: CPT | Performed by: PATHOLOGY

## 2023-08-10 PROCEDURE — 258N000001 HC RX 258: Performed by: EMERGENCY MEDICINE

## 2023-08-10 PROCEDURE — 36415 COLL VENOUS BLD VENIPUNCTURE: CPT | Performed by: PATHOLOGY

## 2023-08-10 PROCEDURE — 999N000248 HC STATISTIC IV INSERT WITH US BY RN

## 2023-08-10 PROCEDURE — 02HV33Z INSERTION OF INFUSION DEVICE INTO SUPERIOR VENA CAVA, PERCUTANEOUS APPROACH: ICD-10-PCS

## 2023-08-10 PROCEDURE — 250N000009 HC RX 250: Performed by: EMERGENCY MEDICINE

## 2023-08-10 PROCEDURE — 82140 ASSAY OF AMMONIA: CPT | Performed by: EMERGENCY MEDICINE

## 2023-08-10 PROCEDURE — 82010 KETONE BODYS QUAN: CPT

## 2023-08-10 PROCEDURE — 71045 X-RAY EXAM CHEST 1 VIEW: CPT | Mod: 26 | Performed by: RADIOLOGY

## 2023-08-10 PROCEDURE — 82330 ASSAY OF CALCIUM: CPT

## 2023-08-10 PROCEDURE — 83735 ASSAY OF MAGNESIUM: CPT | Performed by: PATHOLOGY

## 2023-08-10 PROCEDURE — 5A1D70Z PERFORMANCE OF URINARY FILTRATION, INTERMITTENT, LESS THAN 6 HOURS PER DAY: ICD-10-PCS

## 2023-08-10 PROCEDURE — 99291 CRITICAL CARE FIRST HOUR: CPT | Mod: 25

## 2023-08-10 PROCEDURE — 999N000065 XR CHEST PORT 1 VIEW

## 2023-08-10 PROCEDURE — 200N000002 HC R&B ICU UMMC

## 2023-08-10 PROCEDURE — 250N000013 HC RX MED GY IP 250 OP 250 PS 637: Performed by: EMERGENCY MEDICINE

## 2023-08-10 PROCEDURE — 74176 CT ABD & PELVIS W/O CONTRAST: CPT | Mod: 26 | Performed by: RADIOLOGY

## 2023-08-10 PROCEDURE — 90937 HEMODIALYSIS REPEATED EVAL: CPT

## 2023-08-10 PROCEDURE — 82947 ASSAY GLUCOSE BLOOD QUANT: CPT | Performed by: EMERGENCY MEDICINE

## 2023-08-10 PROCEDURE — 83605 ASSAY OF LACTIC ACID: CPT

## 2023-08-10 PROCEDURE — 99215 OFFICE O/P EST HI 40 MIN: CPT | Mod: VID | Performed by: PHYSICIAN ASSISTANT

## 2023-08-10 PROCEDURE — 82947 ASSAY GLUCOSE BLOOD QUANT: CPT

## 2023-08-10 PROCEDURE — 93005 ELECTROCARDIOGRAM TRACING: CPT

## 2023-08-10 PROCEDURE — G1010 CDSM STANSON: HCPCS | Mod: GC | Performed by: RADIOLOGY

## 2023-08-10 PROCEDURE — 250N000011 HC RX IP 250 OP 636

## 2023-08-10 PROCEDURE — 80053 COMPREHEN METABOLIC PANEL: CPT | Performed by: PATHOLOGY

## 2023-08-10 PROCEDURE — 93010 ELECTROCARDIOGRAM REPORT: CPT | Performed by: EMERGENCY MEDICINE

## 2023-08-10 PROCEDURE — 82010 KETONE BODYS QUAN: CPT | Performed by: INTERNAL MEDICINE

## 2023-08-10 PROCEDURE — 85027 COMPLETE CBC AUTOMATED: CPT

## 2023-08-10 PROCEDURE — 85027 COMPLETE CBC AUTOMATED: CPT | Performed by: EMERGENCY MEDICINE

## 2023-08-10 PROCEDURE — 258N000003 HC RX IP 258 OP 636

## 2023-08-10 PROCEDURE — 82550 ASSAY OF CK (CPK): CPT

## 2023-08-10 PROCEDURE — 71045 X-RAY EXAM CHEST 1 VIEW: CPT

## 2023-08-10 PROCEDURE — 85025 COMPLETE CBC W/AUTO DIFF WBC: CPT | Performed by: PATHOLOGY

## 2023-08-10 PROCEDURE — 83735 ASSAY OF MAGNESIUM: CPT

## 2023-08-10 PROCEDURE — 250N000011 HC RX IP 250 OP 636: Mod: JZ | Performed by: EMERGENCY MEDICINE

## 2023-08-10 PROCEDURE — 83690 ASSAY OF LIPASE: CPT | Performed by: EMERGENCY MEDICINE

## 2023-08-10 PROCEDURE — 36556 INSERT NON-TUNNEL CV CATH: CPT | Mod: GC

## 2023-08-10 PROCEDURE — 96365 THER/PROPH/DIAG IV INF INIT: CPT | Performed by: EMERGENCY MEDICINE

## 2023-08-10 PROCEDURE — 87486 CHLMYD PNEUM DNA AMP PROBE: CPT

## 2023-08-10 PROCEDURE — 36415 COLL VENOUS BLD VENIPUNCTURE: CPT

## 2023-08-10 PROCEDURE — 36591 DRAW BLOOD OFF VENOUS DEVICE: CPT

## 2023-08-10 PROCEDURE — 85610 PROTHROMBIN TIME: CPT | Performed by: EMERGENCY MEDICINE

## 2023-08-10 RX ORDER — ROSUVASTATIN CALCIUM 10 MG/1
20 TABLET, COATED ORAL DAILY
Status: DISCONTINUED | OUTPATIENT
Start: 2023-08-11 | End: 2023-08-16 | Stop reason: HOSPADM

## 2023-08-10 RX ORDER — NALOXONE HYDROCHLORIDE 0.4 MG/ML
0.2 INJECTION, SOLUTION INTRAMUSCULAR; INTRAVENOUS; SUBCUTANEOUS
Status: DISCONTINUED | OUTPATIENT
Start: 2023-08-10 | End: 2023-08-16 | Stop reason: HOSPADM

## 2023-08-10 RX ORDER — ACETAMINOPHEN 325 MG/1
650 TABLET ORAL EVERY 4 HOURS PRN
Status: DISCONTINUED | OUTPATIENT
Start: 2023-08-10 | End: 2023-08-16 | Stop reason: HOSPADM

## 2023-08-10 RX ORDER — AMOXICILLIN 250 MG
1 CAPSULE ORAL 2 TIMES DAILY PRN
Status: DISCONTINUED | OUTPATIENT
Start: 2023-08-10 | End: 2023-08-16 | Stop reason: HOSPADM

## 2023-08-10 RX ORDER — DEXTROSE MONOHYDRATE 25 G/50ML
25 INJECTION, SOLUTION INTRAVENOUS ONCE
Status: COMPLETED | OUTPATIENT
Start: 2023-08-10 | End: 2023-08-10

## 2023-08-10 RX ORDER — HYDROMORPHONE HYDROCHLORIDE 1 MG/ML
0.5 INJECTION, SOLUTION INTRAMUSCULAR; INTRAVENOUS; SUBCUTANEOUS EVERY 10 MIN PRN
Status: DISCONTINUED | OUTPATIENT
Start: 2023-08-10 | End: 2023-08-11

## 2023-08-10 RX ORDER — AMMONIUM LACTATE 12 G/100G
CREAM TOPICAL DAILY PRN
COMMUNITY
End: 2023-09-14

## 2023-08-10 RX ORDER — ASPIRIN 81 MG/1
162 TABLET ORAL DAILY
Status: DISCONTINUED | OUTPATIENT
Start: 2023-08-11 | End: 2023-08-11

## 2023-08-10 RX ORDER — PREDNISONE 5 MG/1
5 TABLET ORAL DAILY
Status: DISCONTINUED | OUTPATIENT
Start: 2023-08-11 | End: 2023-08-16 | Stop reason: HOSPADM

## 2023-08-10 RX ORDER — DEXTROSE MONOHYDRATE 25 G/50ML
25-50 INJECTION, SOLUTION INTRAVENOUS
Status: DISCONTINUED | OUTPATIENT
Start: 2023-08-10 | End: 2023-08-11

## 2023-08-10 RX ORDER — NICOTINE POLACRILEX 4 MG
15-30 LOZENGE BUCCAL
Status: DISCONTINUED | OUTPATIENT
Start: 2023-08-10 | End: 2023-08-11

## 2023-08-10 RX ORDER — NALOXONE HYDROCHLORIDE 0.4 MG/ML
0.4 INJECTION, SOLUTION INTRAMUSCULAR; INTRAVENOUS; SUBCUTANEOUS
Status: DISCONTINUED | OUTPATIENT
Start: 2023-08-10 | End: 2023-08-16 | Stop reason: HOSPADM

## 2023-08-10 RX ORDER — DEXTROSE MONOHYDRATE 25 G/50ML
25-50 INJECTION, SOLUTION INTRAVENOUS
Status: DISCONTINUED | OUTPATIENT
Start: 2023-08-10 | End: 2023-08-10

## 2023-08-10 RX ORDER — HEPARIN SODIUM 5000 [USP'U]/.5ML
5000 INJECTION, SOLUTION INTRAVENOUS; SUBCUTANEOUS EVERY 8 HOURS
Status: DISCONTINUED | OUTPATIENT
Start: 2023-08-11 | End: 2023-08-15

## 2023-08-10 RX ORDER — METFORMIN HCL 500 MG
500 TABLET, EXTENDED RELEASE 24 HR ORAL DAILY
Status: ON HOLD | COMMUNITY
End: 2023-08-16

## 2023-08-10 RX ORDER — PIPERACILLIN SODIUM, TAZOBACTAM SODIUM 2; .25 G/10ML; G/10ML
2.25 INJECTION, POWDER, LYOPHILIZED, FOR SOLUTION INTRAVENOUS EVERY 6 HOURS
Status: DISCONTINUED | OUTPATIENT
Start: 2023-08-10 | End: 2023-08-11

## 2023-08-10 RX ORDER — AMOXICILLIN 250 MG
2 CAPSULE ORAL 2 TIMES DAILY PRN
Status: DISCONTINUED | OUTPATIENT
Start: 2023-08-10 | End: 2023-08-16 | Stop reason: HOSPADM

## 2023-08-10 RX ORDER — ACETAMINOPHEN 325 MG/10.15ML
650 LIQUID ORAL EVERY 4 HOURS PRN
Status: DISCONTINUED | OUTPATIENT
Start: 2023-08-10 | End: 2023-08-16 | Stop reason: HOSPADM

## 2023-08-10 RX ORDER — CALCIUM GLUCONATE 20 MG/ML
1 INJECTION, SOLUTION INTRAVENOUS ONCE
Status: COMPLETED | OUTPATIENT
Start: 2023-08-10 | End: 2023-08-10

## 2023-08-10 RX ORDER — PANTOPRAZOLE SODIUM 40 MG/1
40 TABLET, DELAYED RELEASE ORAL
Status: DISCONTINUED | OUTPATIENT
Start: 2023-08-11 | End: 2023-08-16 | Stop reason: HOSPADM

## 2023-08-10 RX ORDER — NICOTINE POLACRILEX 4 MG
15-30 LOZENGE BUCCAL
Status: DISCONTINUED | OUTPATIENT
Start: 2023-08-10 | End: 2023-08-10

## 2023-08-10 RX ORDER — TAMSULOSIN HYDROCHLORIDE 0.4 MG/1
0.4 CAPSULE ORAL DAILY
Status: DISCONTINUED | OUTPATIENT
Start: 2023-08-11 | End: 2023-08-16 | Stop reason: HOSPADM

## 2023-08-10 RX ADMIN — VANCOMYCIN HYDROCHLORIDE 2000 MG: 1 INJECTION, POWDER, LYOPHILIZED, FOR SOLUTION INTRAVENOUS at 21:56

## 2023-08-10 RX ADMIN — DEXTROSE MONOHYDRATE 25 G: 25 INJECTION, SOLUTION INTRAVENOUS at 19:13

## 2023-08-10 RX ADMIN — PIPERACILLIN SODIUM AND TAZOBACTAM SODIUM 2.25 G: 2; .25 INJECTION, POWDER, LYOPHILIZED, FOR SOLUTION INTRAVENOUS at 21:27

## 2023-08-10 RX ADMIN — SODIUM BICARBONATE: 84 INJECTION, SOLUTION INTRAVENOUS at 19:25

## 2023-08-10 RX ADMIN — HUMAN INSULIN 8.3 UNITS: 100 INJECTION, SOLUTION SUBCUTANEOUS at 19:14

## 2023-08-10 RX ADMIN — SODIUM CHLORIDE 1000 ML: 9 INJECTION, SOLUTION INTRAVENOUS at 20:51

## 2023-08-10 RX ADMIN — SODIUM CHLORIDE 1000 ML: 9 INJECTION, SOLUTION INTRAVENOUS at 18:41

## 2023-08-10 RX ADMIN — SODIUM CHLORIDE, POTASSIUM CHLORIDE, SODIUM LACTATE AND CALCIUM CHLORIDE 1000 ML: 600; 310; 30; 20 INJECTION, SOLUTION INTRAVENOUS at 20:14

## 2023-08-10 RX ADMIN — HYDROMORPHONE HYDROCHLORIDE 0.5 MG: 1 INJECTION, SOLUTION INTRAMUSCULAR; INTRAVENOUS; SUBCUTANEOUS at 21:42

## 2023-08-10 RX ADMIN — SODIUM CHLORIDE 300 ML: 9 INJECTION, SOLUTION INTRAVENOUS at 23:35

## 2023-08-10 RX ADMIN — SODIUM CHLORIDE 250 ML: 9 INJECTION, SOLUTION INTRAVENOUS at 23:35

## 2023-08-10 RX ADMIN — CALCIUM GLUCONATE 1 G: 20 INJECTION, SOLUTION INTRAVENOUS at 18:52

## 2023-08-10 ASSESSMENT — ACTIVITIES OF DAILY LIVING (ADL)
ADLS_ACUITY_SCORE: 33
CONCENTRATING,_REMEMBERING_OR_MAKING_DECISIONS_DIFFICULTY: NO
DOING_ERRANDS_INDEPENDENTLY_DIFFICULTY: NO
DIFFICULTY_EATING/SWALLOWING: NO
WEAR_GLASSES_OR_BLIND: YES
WALKING_OR_CLIMBING_STAIRS_DIFFICULTY: NO
FALL_HISTORY_WITHIN_LAST_SIX_MONTHS: NO
TOILETING_ISSUES: NO
DRESSING/BATHING_DIFFICULTY: NO
CHANGE_IN_FUNCTIONAL_STATUS_SINCE_ONSET_OF_CURRENT_ILLNESS/INJURY: NO
ADLS_ACUITY_SCORE: 28
ADLS_ACUITY_SCORE: 35
ADLS_ACUITY_SCORE: 35

## 2023-08-10 ASSESSMENT — PAIN SCALES - GENERAL: PAINLEVEL: EXTREME PAIN (8)

## 2023-08-10 NOTE — ED PROVIDER NOTES
[facility] ([facility])    8/10/23         History     Chief Complaint   Patient presents with    Abnormal Labs     The history is provided by the patient and medical records.     Frandy Workman is a 58 year old male who with PMH of type 2 diabetes complicated by nephropathy, retinopathy and neuropathy. 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     Patient reports that he is not feeling well and that his kidneys are failing. Patient reports that he was able to walk about 6 miles, about 3 miles in the morning and 3 miles in the evening, but he became weak and was not able to go on his walks since 4 days.  Patient also reports that he had lost his appetite and he has lost about 5 pounds. He reports that his urine has been very yellow in color.  He reports that he has not had a bowel movement since Tuesday after taking medications Zofran. He last took Zofran this morning at 8:30 AM. He was taking Zofran for nausea and vomiting for the past 4 days. Zofran has been the only new medication that he has been taking.  Also, his abdominal pain has gotten worse.  He is also on Bactrim and chemotherapy. Patient also reports that it hurts to sit in his lower back. Denies swelling in legs.  Denies chest pain or shortness of breath.  Notes a mild cough, no rhinorrhea or sore throat.  Denies palpitations or irregular heartbeats.  Denies any history of fever.    Per epic Review:   7/28/23: CT Chest/abdomen/Pelvis with contrast:   IMPRESSION:  1.  Status post liver transplant. No evidence of hepatic mass.  2.  Peritoneal nodule adjacent to the cecum has been resected without  evidence of residual tumor. Small nodule measuring 4 mm adjacent to  the hepatic flexure has decreased in size compared to  6/14/2023 CT  scan.   3.  Subcutaneous soft tissue thickening in the right upper quadrant  abdominal wall is likely from laparoscopy.   4.  No evidence of metastasis in the chest.       Past Medical History  Past Medical History:   Diagnosis Date    Anemia 2013    Arthritis     BPH (benign prostatic hyperplasia)     CAD (coronary artery disease) 04/01/2019    Cholelithiasis     Conductive hearing loss 08/16/2017    Depressive disorder 1986    Suffer effects throughout life    Gastroesophageal reflux disease 12/01/2014    HCC (hepatocellular carcinoma) (H) 01/22/2019    History of diabetic retinopathy 07/2018    HTN (hypertension) 11/20/2019    Hyperlipidemia     Liver cirrhosis secondary to ESTRADA (H)     Liver transplanted (H) 11/11/2019    Portal vein thrombosis 04/11/2019    Type II diabetes mellitus (H)      Past Surgical History:   Procedure Laterality Date    BYPASS GRAFT ARTERY CORONARY N/A 7/14/2021    Procedure: median sternotomy, on cardiopulmonary bypass, CORONARY ARTERY BYPASS GRAFT (CABG) x2 with left greater saphenous vein endoscopic harvest and left internal mammery artery harvest;  Surgeon: Tom Zapata MD;  Location: [facility] OR    COLONOSCOPY      2015    COLONOSCOPY N/A 12/6/2019    Procedure: COLONOSCOPY, WITH POLYPECTOMY AND BIOPSY;  Surgeon: Adam Morton MD;  Location: [facility] GI    CV CENTRAL VENOUS CATHETER PLACEMENT N/A 7/12/2021    Procedure: Central Venous Catheter Placement;  Surgeon: Fermin Polanco MD;  Location:  HEART CARDIAC CATH LAB    CV CORONARY ANGIOGRAM N/A 7/12/2021    Procedure: Coronary Angiogram;  Surgeon: Fermin Polanco MD;  Location:  HEART CARDIAC CATH LAB    CV HEART CATHETERIZATION WITH POSSIBLE INTERVENTION N/A 2/26/2019    Procedure: CORS;  Surgeon: Jagdish Hoyt MD;  Location:  HEART CARDIAC CATH LAB    CV INTRA AORTIC BALLOON N/A 7/12/2021    Procedure: Intra Aortic Balloon Pump Insertion;  Surgeon: Fermin Polanco  MD Karyn;  Location:  HEART CARDIAC CATH LAB    ESOPHAGOSCOPY, GASTROSCOPY, DUODENOSCOPY (EGD), COMBINED N/A 11/17/2016    Procedure: COMBINED ESOPHAGOSCOPY, GASTROSCOPY, DUODENOSCOPY (EGD);  Surgeon: Santi Rosas MD;  Location:  GI    ESOPHAGOSCOPY, GASTROSCOPY, DUODENOSCOPY (EGD), COMBINED N/A 11/17/2017    Procedure: COMBINED ESOPHAGOSCOPY, GASTROSCOPY, DUODENOSCOPY (EGD);  EGD;  Surgeon: Santi Rosas MD;  Location:  GI    ESOPHAGOSCOPY, GASTROSCOPY, DUODENOSCOPY (EGD), COMBINED N/A 12/28/2018    Procedure: EGD;  Surgeon: Santi Rosas MD;  Location:  OR    ESOPHAGOSCOPY, GASTROSCOPY, DUODENOSCOPY (EGD), COMBINED N/A 12/6/2019    Procedure: ESOPHAGOGASTRODUODENOSCOPY, WITH BIOPSY;  Surgeon: Adam Morton MD;  Location:  GI    ESOPHAGOSCOPY, GASTROSCOPY, DUODENOSCOPY (EGD), COMBINED N/A 2/13/2020    Procedure: ESOPHAGOGASTRODUODENOSCOPY (EGD);  Surgeon: Santi Rosas MD;  Location:  GI    EXCISE MASS ABDOMEN N/A 7/13/2023    Procedure: Laparoscopic lysis of adhesions, laparoscopic resection of abdominal mass;  Surgeon: Ruiz Chu MD;  Location:  OR    HEAD & NECK SURGERY      12/2017 at [facility].     IMPLANT GOLD WEIGHT EYELID Right 11/16/2017    Procedure: IMPLANT WEIGHT EYELID;  Right Upper Eyelid Weight, right tarsal strip lower eyelid;  Surgeon: Milana Malave MD;  [facility]    IR CHEMO EMBOLIZATION  1/22/2019    KNEE SURGERY Left     ORTHOPEDIC SURGERY      PAROTIDECTOMY, RADICAL NECK DISSECTION Right 11/2/2017    Procedure: PAROTIDECTOMY, RADICAL NECK DISSECTION;  Right Superfacial Parotidectomy , Facial nerve repair. with [facility] facial nerve monitor.;  Surgeon: Asiya Morgan MD;  Location:  OR    PHACOEMULSIFICATION CLEAR CORNEA WITH STANDARD INTRAOCULAR LENS IMPLANT Right 1/24/2023    Procedure: PHACOEMULSIFICATION, COMPLEX CATARACT, WITH INTRAOCULAR LENS IMPLANT WITH TRYPAN RIGHT EYE;  Surgeon: Enriqueta Martin,  MD;  Location: [facility] OR    PHACOEMULSIFICATION WITH STANDARD INTRAOCULAR LENS IMPLANT Left 1/3/2023    Procedure: PHACOEMULSIFICATION, COMPLEX CATARACT, WITH STANDARD INTRAOCULAR LENS IMPLANT INSERTION LEFT EYE;  Surgeon: Enriqueta Martin MD;  Location: [facility] OR    PICC INSERTION Left 2017    4fr SL BioFlo PICC, 44cm in the L basilic vein w/ tip in the low SVC    RETURN LIVER TRANSPLANT N/A 2019    Procedure: Exploratory laparotomy, hematoma evacuation, abdominal washout;  Surgeon: Александр Ramos MD;  Location: [facility] OR    TRANSPLANT LIVER RECIPIENT  DONOR N/A 2019    Procedure: TRANSPLANT, LIVER, RECIPIENT,  DONOR;  Surgeon: Александр Ramos MD;  Location: [facility] OR    VASCULAR SURGERY       acetaminophen (TYLENOL) 325 MG tablet  Alcohol Swabs (ALCOHOL PREP) 70 % PADS  ammonium lactate (AMLACTIN) 12 % external cream  aspirin (SM ASPIRIN ADULT LOW STRENGTH) 81 MG EC tablet  BD VIKTORIA U/F 32G X 4 MM insulin pen needle  benzoyl peroxide 5 % external liquid  Continuous Blood Gluc Sensor (FREESTYLE CLAUDIA 2 SENSOR) MISC  empagliflozin (JARDIANCE) 10 MG TABS tablet  insulin aspart (NOVOLOG PEN) 100 UNIT/ML pen  insulin degludec (TRESIBA FLEXTOUCH) 100 UNIT/ML pen  ketoconazole (NIZORAL) 2 % external shampoo  lamiVUDine (EPIVIR) 100 MG tablet  lisinopril (ZESTRIL) 10 MG tablet  metFORMIN (GLUCOPHAGE XR) 500 MG 24 hr tablet  metoprolol succinate ER (TOPROL XL) 25 MG 24 hr tablet  Misc Natural Products (NEURIVA PO)  Multiple Vitamin (TAB-A-GLADIS) TABS  mycophenolate (GENERIC EQUIVALENT) 250 MG capsule  ondansetron (ZOFRAN ODT) 8 MG ODT tab  order for DME  oxyCODONE (ROXICODONE) 5 MG tablet  pantoprazole (PROTONIX) 40 MG EC tablet  predniSONE (DELTASONE) 5 MG tablet  pseudoePHEDrine (SUDAFED) 60 MG tablet  rosuvastatin (CRESTOR) 20 MG tablet  SORAfenib (NEXAVAR) 200 MG tablet  tamsulosin (FLOMAX) 0.4 MG capsule  Vitamin D3 50 mcg (2000 units) tablet      Allergies   Allergen Reactions     Codeine Other (See Comments)     Cannot take due to liver  Cannot tolerate oral narcotics    Seasonal Allergies      Sneezing, coughing, runny and itchy eyes     Family History  Family History   Problem Relation Age of Onset    Skin Cancer Mother     Cancer Mother         mastectomy    Diabetes Mother     Cerebrovascular Disease Mother     Thyroid Disease Mother     Depression Mother     Colon Cancer Father 60    Pancreatic Cancer Father 60    Prostate Cancer Father     Macular Degeneration Father     Glaucoma Father     Skin Cancer Father     Thyroid Disease Sister     Depression Sister     Asthma Sister     Sleep Apnea Brother     Colorectal Cancer Maternal Grandmother     Cancer Maternal Grandmother     Substance Abuse Maternal Grandmother         Alcohol    Prostate Cancer Maternal Grandfather     Substance Abuse Maternal Grandfather         Alcohol    Colorectal Cancer Paternal Grandmother     Liver Disease No family hx of     Melanoma No family hx of     Anesthesia Reaction No family hx of     Deep Vein Thrombosis (DVT) No family hx of      Social History   Social History     Tobacco Use    Smoking status: Former     Packs/day: 6.00     Years: 30.00     Pack years: 180.00     Types: Cigars, Cigarettes     Start date: 2016     Quit date: 10/25/2017     Years since quittin.7     Passive exposure: Past    Smokeless tobacco: Former     Types: Chew     Quit date: 10/31/2017    Tobacco comments:     1 tin per week   Vaping Use    Vaping Use: Never used   Substance Use Topics    Alcohol use: No     Alcohol/week: 0.0 standard drinks of alcohol     Comment: quit 1996    Drug use: No      Past medical history, past surgical history, medications, allergies, family history, and social history were reviewed with the patient. No additional pertinent items.      A complete review of systems was performed with pertinent positives and negatives noted in the HPI, and all other systems negative.    Physical Exam    BP: 94/54  Pulse: 111  Temp: 98.7  F (37.1  C)  Resp: 20  Weight: 82.6 kg (182 lb)  SpO2: 98 %  Physical Exam  General: patient is alert and oriented and in no acute distress   Head: atraumatic and normocephalic   EENT: tacky mucus membranes,sclera anicteric   neck: supple   Cardiovascular: Tachycardia, extremities warm and well perfused, no lower extremity edema  Pulmonary: lungs clear to auscultation bilaterally   Abdomen: soft, mild tenderness to palpation across the lower abdomen, no rebound or guarding  Musculoskeletal: normal range of motion   Neurological: alert and oriented, moving all extremities symmetrically, gait normal   Skin: warm, dry     ED Course, Procedures, & Data    4:51 PM  The patient was seen and examined by Sera Bello MD. in Room ED 32.   Procedures       ED Course Selections:        EKG Interpretation:      Interpreted by Sera Bello MD  Time reviewed: 1715  Symptoms at time of EKG: None   Rhythm: sinus tachycardia  Rate: Tachycardia  Axis: Normal  Ectopy: none  Conduction: normal  ST Segments/ T Waves: T wave peaking V2 and V3  Q Waves: none  Comparison to prior: increased peaked t-waves    Clinical Impression: hyperkalemia                 Results for orders placed or performed in visit on 08/10/23   CBC with platelets     Status: Abnormal   Result Value Ref Range    WBC Count 10.3 4.0 - 11.0 10e3/uL    RBC Count 4.25 (L) 4.40 - 5.90 10e6/uL    Hemoglobin 13.3 13.3 - 17.7 g/dL    Hematocrit 42.4 40.0 - 53.0 %     78 - 100 fL    MCH 31.3 26.5 - 33.0 pg    MCHC 31.4 (L) 31.5 - 36.5 g/dL    RDW 13.8 10.0 - 15.0 %    Platelet Count 178 150 - 450 10e3/uL   Comprehensive metabolic panel     Status: Abnormal   Result Value Ref Range    Sodium 139 136 - 145 mmol/L    Potassium 6.3 (HH) 3.4 - 5.3 mmol/L    Chloride 112 (H) 98 - 107 mmol/L    Carbon Dioxide (CO2) 9 (LL) 22 - 29 mmol/L    Anion Gap 18 (H) 7 - 15 mmol/L    Urea Nitrogen 107.0 (H) 8.0 - 23.0 mg/dL     Creatinine 6.50 (H) 0.67 - 1.17 mg/dL    Calcium 9.1 8.6 - 10.0 mg/dL    Glucose 180 (H) 70 - 99 mg/dL    Alkaline Phosphatase 110 40 - 129 U/L    AST 24 0 - 45 U/L    ALT 27 0 - 70 U/L    Protein Total 7.6 6.4 - 8.3 g/dL    Albumin 4.4 3.5 - 5.2 g/dL    Bilirubin Total 0.4 <=1.2 mg/dL    GFR Estimate 9 (L) >60 mL/min/1.73m2   Magnesium     Status: Normal   Result Value Ref Range    Magnesium 2.3 1.7 - 2.3 mg/dL   Phosphorus     Status: Abnormal   Result Value Ref Range    Phosphorus 6.0 (H) 2.5 - 4.5 mg/dL   Bilirubin direct     Status: Normal   Result Value Ref Range    Bilirubin Direct <0.20 0.00 - 0.30 mg/dL     Medications - No data to display  Labs Ordered and Resulted from Time of ED Arrival to Time of ED Departure - No data to display  No orders to display          Critical care was performed.   Critical Care Addendum  My initial assessment, based on my review of nursing observations, review of vital signs, focused history, physical exam, review of cardiac rhythm monitor, 12 lead ECG analysis, and discussion with nephrology and ICU , established a high suspicion that Frandy Workman has a high risk electrolyte abnormality, which requires immediate intervention, and therefore he is critically ill.     After the initial assessment, the care team initiated multiple lab tests, initiated IV fluid administration, and initiated medication therapy with calcium, insulin, glucose, sodium bicarbonate  to provide stabilization care. Due to the critical nature of this patient, I reassessed vital signs, physical exam, review of cardiac rhythm monitor, and respiratory status multiple times prior to his disposition.     Time also spent performing documentation, reviewing test results, discussion with consultants, and coordination of care.     Critical care time (excluding teaching time and procedures): 60 minutes.     Assessment & Plan    Patient is a 58-year-old male with a history of type 2 diabetes mellitus, diabetic  nephropathy, hypertension, hyperlipidemia, coronary artery disease, liver cirrhosis secondary to ESTRADA complicated by hepatocellular carcinoma and status post liver transplant in 2019 who presents to the emergency department due to generalized fatigue and clinic referral for abnormal labs. He was initially noted to be tachycardic and hypotensive however on recheck blood pressure has normalized.  Heart rate is in the low 100s.  He is afebrile and in no respiratory distress.  He has very difficult IV access requiring multiple attempts which delayed his lab results. I-STAT labs returned with an elevated potassium to 7.7, pH of 7.087 and bicarb of 8.8. His ECG does show peaked T waves.  He was given calcium gluconate, insulin and glucose and started on a bicarb drip. He was also given additional IV fluids.  Nephrology was emergently consulted for dialysis and will plan to dialyze him tonight.  Additionally his labs are notable for a creatinine of 7.38, lipase of 797, procalcitonin of 0.35, troponin of 66, hemoglobin of 11.9 and normal white count.  Blood cultures were drawn and pending. UA is ordered and pending.  CXR no focal infiltrates. He was empirically given vancomycin and Zosyn due to concerns of potential infectious contribution of his symptoms. Non contrast CT of the abdomen shows no intra-abdominal infection or obstructive etiology. No evidence of ureterolithiasis.  Patient will be admitted to the ICU for continued workup and management.  Confirmed with the patient he is full code.    This part of the medical record was transcribed by JOAO RAND Medical Scribe, from a dictation done by Sera Bello MD.     I have reviewed the nursing notes. I have reviewed the findings, diagnosis, plan and need for follow up with the patient.    Current Discharge Medication List          Final diagnoses:   Hyperkalemia   Acute renal failure, unspecified acute renal failure type (H)   HCC (hepatocellular carcinoma)  (H)   Liver replaced by transplant (H)   I, JOAO RAND, am serving as a trained medical scribe to document services personally performed by Sera Bello MD, based on the provider's statements to me.     I, Sera Bello MD, was physically present and have reviewed and verified the accuracy of this note documented by JOAO RAND.     Sera Bello MD.     [facility] EMERGENCY DEPARTMENT  8/10/2023          Sera Bello MD  08/11/23 0018

## 2023-08-10 NOTE — NURSING NOTE
Is the patient currently in the state of [place]? YES    Visit mode:VIDEO    If the visit is dropped, the patient can be reconnected by: VIDEO VISIT: Text to cell phone: [phone]    Will anyone else be joining the visit? YES: How would they like to receive their invitation?       How would you like to obtain your AVS? MyChart    Are changes needed to the allergy or medication list? NO    Reason for visit: RECHECK      Ayah Cheng

## 2023-08-10 NOTE — TELEPHONE ENCOUNTER
DATE/TIME OF CALL RECEIVED FROM LAB:  08/10/23 at 1:09 PM   LAB TEST/VALUE:  Potassium 6.3 and CO2 9  (7/28/23 potassium was 5.5 and CO2 was 17)  PROVIDER NOTIFIED?: Yes  PROVIDER NAME: Ashli Dover ([facility])  TIME LAB VALUE REPORTED TO PROVIDER: [id number]  MECHANISM OF PROVIDER NOTIFICATION:  Secure Epic Chat   PROVIDER RESPONSE: [id number] Ashli acknowledge and states she will address with pt during visit. No further action required by triage.

## 2023-08-10 NOTE — TELEPHONE ENCOUNTER
SOHEILA and sent My Chart message to schedule follow up appointment with Dr. Rao with labs // MANUALLY schedule in held spots on 09/15 or 09/18 // first attempt 08/10/2023 [facility]

## 2023-08-10 NOTE — ED TRIAGE NOTES
58 yr old ambulatory to triage referred to ED by oncology team for abnormal renal function tests and hyperkalemia - 6.3.  Hx of hepatocellular carcinoma and liver transplant.      Triage Assessment       Row Name 08/10/23 [id number]       Triage Assessment (Adult)    Airway WDL WDL       Respiratory WDL    Respiratory WDL WDL       Skin Circulation/Temperature WDL    Skin Circulation/Temperature WDL WDL       Cardiac WDL    Cardiac WDL X;rhythm  Hypotensive and tachycardic    Pulse Rate & Regularity tachycardic       Peripheral/Neurovascular WDL    Peripheral Neurovascular WDL WDL       Cognitive/Neuro/Behavioral WDL    Cognitive/Neuro/Behavioral WDL WDL

## 2023-08-10 NOTE — LETTER
8/10/2023         RE: Franyd Wokrman  [address] [facility] [facility] [address]        Dear Colleague,    Thank you for referring your patient, Frandy Workman, to the [facility]. Please see a copy of my visit note below.    Reason for Visit: seen in follow-up of hepatocellular carcinoma    Oncology HPI:   Miller Workman is a 58 year old man with a PMH of DMII, bipolar disorder, cirrhosis s/t ESTRADA with subsequent development of HCC, s/p liver transplant , HLD, HTN, GERD, BPH and CAD with hx of 2 vessel CABG in July 2021, CKD with anemia of chronic disease, on erythropoetin.      He as diagnosed with hepatocellular carcinoma in December 2018 when he was found to have 2 LIRADS 5 lesions on surveillance imaging. He underwent TACE on 1/22/19.  He is s/p  liver transplant on 11/11/2019. The explanted liver had 2 lesions that were mostly necrotic and less than 3 cm with no clearly identifiable vascular invasion.      He was found to have peritoneal masses on routine surveillance imaging in June 2023. He underwent laparoscopy with biopsies and pericecal mass resection confirming metastatic HCC.     He met with Dr. Villarreal in July with recommendation to start sorafenib. He started 400 mg BID on 7/29/23. He presents for a toxicity assessment today.     Interval history: Miller has been feeling terrible for 4 days. He started to have nausea/vomiting and diarrhea about 4 days ago. 2 days ago it was the worse with 5x emesis and the same amount or more of diarrhea. He started taking zofran ODT every 7 hours from Dr. Ortiz and has not had any recurrence of either in 1.5 days. However he has not had any intake in 4 days. Only had about 2 sips of Sprite today.     Feels dizzy, has way less urine output, and urine is concentrated.     His whole body feels sore, especially his lower legs are very painful. He quit exercising 2 days ago.     He has had hot flashes and chills. Is coughing with phlegm  production. His caretaker is sick with a URI.     Current Outpatient Medications   Medication Sig Dispense Refill    acetaminophen (TYLENOL) 325 MG tablet Take 1 tablet (325 mg) by mouth every 6 hours as needed for mild pain 90 tablet 3    Alcohol Swabs (ALCOHOL PREP) 70 % PADS Use for glucose testing 4x daily  and insulin administration 4x daily. 400 each 1    ammonium lactate (AMLACTIN) 12 % external cream Apply topically 2 times daily To feet. 140 g 5    aspirin (SM ASPIRIN ADULT LOW STRENGTH) 81 MG EC tablet Take 2 tablets (162 mg) by mouth daily (Patient taking differently: Take 162 mg by mouth every evening) 180 tablet 3    BD VIKTORIA U/F 32G X 4 MM insulin pen needle Use 5 per day 300 each 3    benzoyl peroxide 5 % external liquid Use topically in showers as a body wash 226 g 11    Continuous Blood Gluc Sensor (FREESTYLE CLAUDIA 2 SENSOR) MISC 1 each See Admin Instructions Change every 14 days. 7 each 3    empagliflozin (JARDIANCE) 10 MG TABS tablet Take 1 tablet (10 mg) by mouth daily 90 tablet 3    insulin aspart (NOVOLOG PEN) 100 UNIT/ML pen Inject 5-10 Units Subcutaneous 4 times daily (with meals and nightly) 1unit : 8 g carb before meals.  Also add 1 unit : 50 mg/dl >180 before meals and at bedtime. 45 mL 3    insulin degludec (TRESIBA FLEXTOUCH) 100 UNIT/ML pen Inject 34 units subcutaneous once daily 45 mL 3    ketoconazole (NIZORAL) 2 % external shampoo Apply thin layer topically to scalp in shower (leave on 5 min prior to rinse); may also use as a body wash 120 mL 11    lamiVUDine (EPIVIR) 100 MG tablet Take 1 tablet (100 mg) by mouth daily 90 tablet 3    lisinopril (ZESTRIL) 10 MG tablet Take 1 tablet (10 mg) by mouth daily (Patient taking differently: Take 10 mg by mouth every evening) 90 tablet 3    metFORMIN (GLUCOPHAGE XR) 500 MG 24 hr tablet Take 2 tablets (1,000 mg) by mouth daily (Patient taking differently: Take 500 mg by mouth daily) 180 tablet 3    metoprolol succinate ER (TOPROL XL) 25 MG 24 hr  tablet Take 0.5 tablets (12.5 mg) by mouth daily (Patient taking differently: Take 12.5 mg by mouth every evening) 45 tablet 1    Misc Natural Products (NEURIVA PO) Take 2 tablets by mouth daily      Multiple Vitamin (TAB-A-GLADIS) TABS TAKE ONE TABLET BY MOUTH ONCE DAILY 90 tablet 0    mycophenolate (GENERIC EQUIVALENT) 250 MG capsule Take 2 capsules (500 mg) by mouth every 12 hours 120 capsule 11    ondansetron (ZOFRAN ODT) 8 MG ODT tab Take 1 tablet (8 mg) by mouth every 8 hours as needed for nausea 15 tablet 1    order for DME 1 Device by Device route daily Knee high compression socks 15-20 mmhg. (Patient not taking: Reported on 7/20/2023) 1 Device 0    oxyCODONE (ROXICODONE) 5 MG tablet Take 1-2 tablets (5-10 mg) by mouth every 6 hours as needed for severe pain 6 tablet 0    pantoprazole (PROTONIX) 40 MG EC tablet Take 1 tablet (40 mg) by mouth daily before breakfast 90 tablet 3    predniSONE (DELTASONE) 5 MG tablet Take 1 tablet (5 mg) by mouth daily 90 tablet 3    pseudoePHEDrine (SUDAFED) 60 MG tablet Take 60 mg by mouth every 4 hours as needed for congestion (Patient not taking: Reported on 7/20/2023)      rosuvastatin (CRESTOR) 20 MG tablet Take 1 tablet (20 mg) by mouth daily (Patient taking differently: Take 20 mg by mouth every evening) 90 tablet 3    SORAfenib (NEXAVAR) 200 MG tablet Take 2 tablets (400 mg) by mouth 2 times daily for 30 days Take on an empty stomach 1 hour before or 2 hours after a meal. 120 tablet 0    tamsulosin (FLOMAX) 0.4 MG capsule Take 1 capsule (0.4 mg) by mouth daily 90 capsule 3    Vitamin D3 50 mcg (2000 units) tablet Take 1 tablet (50 mcg) by mouth daily 90 tablet 3          Allergies   Allergen Reactions    Codeine Other (See Comments)     Cannot take due to liver  Cannot tolerate oral narcotics    Seasonal Allergies      Sneezing, coughing, runny and itchy eyes          There were no vitals taken for this visit.  Video physical exam  General: Patient appears well in no  acute distress.   Skin: No visualized rash or lesions on visualized skin  Eyes: EOMI, no erythema, sclera icterus or discharge noted  Resp: Appears to be breathing comfortably without accessory muscle usage, speaking in full sentences, no cough  MSK: Appears to have normal range of motion based on visualized movements  Neurologic: No apparent tremors, facial movements symmetric  Psych: affect engaged, pleasant, alert and oriented      Labs:    08/10/23 10:24   Sodium 139   Potassium 6.3 (HH)   Chloride 112 (H)   Carbon Dioxide (CO2) 9 (LL)   Urea Nitrogen 107.0 (H)   Creatinine 6.50 (H)   GFR Estimate 9 (L)   Calcium 9.1   Anion Gap 18 (H)   Magnesium 2.3   Phosphorus 6.0 (H)   Albumin 4.4   Protein Total 7.6   Alkaline Phosphatase 110   ALT 27   AST 24   Bilirubin Direct <0.20   Bilirubin Total 0.4   Glucose 180 (H)   WBC 10.3   Hemoglobin 13.3   Hematocrit 42.4   Platelet Count 178   RBC Count 4.25 (L)      MCH 31.3   MCHC 31.4 (L)   RDW 13.8       Impression/plan:   Hepatocellular carcinoma, s/p prior TACE and liver transplantation in November 2019. Now with metastatic disease to peritoneum.   -Recently started sorafenib 400 mg BID on 7/29.    -Was tolerating well until 4 days ago had sudden onset of subjective fever, cough, n/v/d. Suspect acute viral illness but now he has remained on sorafenib and has ARF with multiple electrolyte disturbances.   -Stop sorafenib.  -Present to ED today for lab recheck, IVF hydration, EKG, URI w/up, and likely admission until kidney function starts to trend toward baseline. Report was called to [facility].   -Once his kidney function returns to baseline, he should probably be on 200 mg BID since his CrCl is often <40.   -Follow-up with us next week with lab recheck and in person visit once acute issues has resolved.     50 minutes spent on the date of the encounter doing chart review, review of test results, interpretation of tests, patient visit, documentation, and  discussion with other provider(s)     Ashli Nunez), CLAUDIA

## 2023-08-10 NOTE — PROGRESS NOTES
Virtual Visit Details    Type of service:  Video Visit   Video Start Time: 2:07 PM  Video End Time: 2:30 pm    Originating Location (pt. Location): Home    Distant Location (provider location):  Off-site  Platform used for Video Visit: Odette        Reason for Visit: seen in follow-up of hepatocellular carcinoma    Oncology HPI:   Mliler Workman is a 58 year old man with a PMH of DMII, bipolar disorder, cirrhosis s/t ESTRADA with subsequent development of HCC, s/p liver transplant , HLD, HTN, GERD, BPH and CAD with hx of 2 vessel CABG in July 2021, CKD with anemia of chronic disease, on erythropoetin.      He as diagnosed with hepatocellular carcinoma in December 2018 when he was found to have 2 LIRADS 5 lesions on surveillance imaging. He underwent TACE on 1/22/19.  He is s/p  liver transplant on 11/11/2019. The explanted liver had 2 lesions that were mostly necrotic and less than 3 cm with no clearly identifiable vascular invasion.      He was found to have peritoneal masses on routine surveillance imaging in June 2023. He underwent laparoscopy with biopsies and pericecal mass resection confirming metastatic HCC.     He met with Dr. Villarreal in July with recommendation to start sorafenib. He started 400 mg BID on 7/29/23. He presents for a toxicity assessment today.     Interval history: Miller has been feeling terrible for 4 days. He started to have nausea/vomiting and diarrhea about 4 days ago. 2 days ago it was the worse with 5x emesis and the same amount or more of diarrhea. He started taking zofran ODT every 7 hours from Dr. Ortiz and has not had any recurrence of either in 1.5 days. However he has not had any intake in 4 days. Only had about 2 sips of Sprite today.     Feels dizzy, has way less urine output, and urine is concentrated.     His whole body feels sore, especially his lower legs are very painful. He quit exercising 2 days ago.     He has had hot flashes and chills. Is coughing with phlegm production. His  caretaker is sick with a URI.     Current Outpatient Medications   Medication Sig Dispense Refill    acetaminophen (TYLENOL) 325 MG tablet Take 1 tablet (325 mg) by mouth every 6 hours as needed for mild pain 90 tablet 3    Alcohol Swabs (ALCOHOL PREP) 70 % PADS Use for glucose testing 4x daily  and insulin administration 4x daily. 400 each 1    ammonium lactate (AMLACTIN) 12 % external cream Apply topically 2 times daily To feet. 140 g 5    aspirin (SM ASPIRIN ADULT LOW STRENGTH) 81 MG EC tablet Take 2 tablets (162 mg) by mouth daily (Patient taking differently: Take 162 mg by mouth every evening) 180 tablet 3    BD VIKTORIA U/F 32G X 4 MM insulin pen needle Use 5 per day 300 each 3    benzoyl peroxide 5 % external liquid Use topically in showers as a body wash 226 g 11    Continuous Blood Gluc Sensor (FREESTYLE CLAUDIA 2 SENSOR) MISC 1 each See Admin Instructions Change every 14 days. 7 each 3    empagliflozin (JARDIANCE) 10 MG TABS tablet Take 1 tablet (10 mg) by mouth daily 90 tablet 3    insulin aspart (NOVOLOG PEN) 100 UNIT/ML pen Inject 5-10 Units Subcutaneous 4 times daily (with meals and nightly) 1unit : 8 g carb before meals.  Also add 1 unit : 50 mg/dl >180 before meals and at bedtime. 45 mL 3    insulin degludec (TRESIBA FLEXTOUCH) 100 UNIT/ML pen Inject 34 units subcutaneous once daily 45 mL 3    ketoconazole (NIZORAL) 2 % external shampoo Apply thin layer topically to scalp in shower (leave on 5 min prior to rinse); may also use as a body wash 120 mL 11    lamiVUDine (EPIVIR) 100 MG tablet Take 1 tablet (100 mg) by mouth daily 90 tablet 3    lisinopril (ZESTRIL) 10 MG tablet Take 1 tablet (10 mg) by mouth daily (Patient taking differently: Take 10 mg by mouth every evening) 90 tablet 3    metFORMIN (GLUCOPHAGE XR) 500 MG 24 hr tablet Take 2 tablets (1,000 mg) by mouth daily (Patient taking differently: Take 500 mg by mouth daily) 180 tablet 3    metoprolol succinate ER (TOPROL XL) 25 MG 24 hr tablet Take 0.5  tablets (12.5 mg) by mouth daily (Patient taking differently: Take 12.5 mg by mouth every evening) 45 tablet 1    Misc Natural Products (NEURIVA PO) Take 2 tablets by mouth daily      Multiple Vitamin (TAB-A-GLADIS) TABS TAKE ONE TABLET BY MOUTH ONCE DAILY 90 tablet 0    mycophenolate (GENERIC EQUIVALENT) 250 MG capsule Take 2 capsules (500 mg) by mouth every 12 hours 120 capsule 11    ondansetron (ZOFRAN ODT) 8 MG ODT tab Take 1 tablet (8 mg) by mouth every 8 hours as needed for nausea 15 tablet 1    order for DME 1 Device by Device route daily Knee high compression socks 15-20 mmhg. (Patient not taking: Reported on 7/20/2023) 1 Device 0    oxyCODONE (ROXICODONE) 5 MG tablet Take 1-2 tablets (5-10 mg) by mouth every 6 hours as needed for severe pain 6 tablet 0    pantoprazole (PROTONIX) 40 MG EC tablet Take 1 tablet (40 mg) by mouth daily before breakfast 90 tablet 3    predniSONE (DELTASONE) 5 MG tablet Take 1 tablet (5 mg) by mouth daily 90 tablet 3    pseudoePHEDrine (SUDAFED) 60 MG tablet Take 60 mg by mouth every 4 hours as needed for congestion (Patient not taking: Reported on 7/20/2023)      rosuvastatin (CRESTOR) 20 MG tablet Take 1 tablet (20 mg) by mouth daily (Patient taking differently: Take 20 mg by mouth every evening) 90 tablet 3    SORAfenib (NEXAVAR) 200 MG tablet Take 2 tablets (400 mg) by mouth 2 times daily for 30 days Take on an empty stomach 1 hour before or 2 hours after a meal. 120 tablet 0    tamsulosin (FLOMAX) 0.4 MG capsule Take 1 capsule (0.4 mg) by mouth daily 90 capsule 3    Vitamin D3 50 mcg (2000 units) tablet Take 1 tablet (50 mcg) by mouth daily 90 tablet 3          Allergies   Allergen Reactions    Codeine Other (See Comments)     Cannot take due to liver  Cannot tolerate oral narcotics    Seasonal Allergies      Sneezing, coughing, runny and itchy eyes          There were no vitals taken for this visit.  Video physical exam  General: Patient appears well in no acute distress.    Skin: No visualized rash or lesions on visualized skin  Eyes: EOMI, no erythema, sclera icterus or discharge noted  Resp: Appears to be breathing comfortably without accessory muscle usage, speaking in full sentences, no cough  MSK: Appears to have normal range of motion based on visualized movements  Neurologic: No apparent tremors, facial movements symmetric  Psych: affect engaged, pleasant, alert and oriented      Labs:    08/10/23 10:24   Sodium 139   Potassium 6.3 (HH)   Chloride 112 (H)   Carbon Dioxide (CO2) 9 (LL)   Urea Nitrogen 107.0 (H)   Creatinine 6.50 (H)   GFR Estimate 9 (L)   Calcium 9.1   Anion Gap 18 (H)   Magnesium 2.3   Phosphorus 6.0 (H)   Albumin 4.4   Protein Total 7.6   Alkaline Phosphatase 110   ALT 27   AST 24   Bilirubin Direct <0.20   Bilirubin Total 0.4   Glucose 180 (H)   WBC 10.3   Hemoglobin 13.3   Hematocrit 42.4   Platelet Count 178   RBC Count 4.25 (L)      MCH 31.3   MCHC 31.4 (L)   RDW 13.8       Impression/plan:   Hepatocellular carcinoma, s/p prior TACE and liver transplantation in November 2019. Now with metastatic disease to peritoneum.   -Recently started sorafenib 400 mg BID on 7/29.    -Was tolerating well until 4 days ago had sudden onset of subjective fever, cough, n/v/d. Suspect acute viral illness but now he has remained on sorafenib and has ARF with multiple electrolyte disturbances.   -Stop sorafenib.  -Present to ED today for lab recheck, IVF hydration, EKG, URI w/up, and likely admission until kidney function starts to trend toward baseline. Report was called to [facility].   -Once his kidney function returns to baseline, he should probably be on 200 mg BID since his CrCl is often <40.   -Follow-up with us next week with lab recheck and in person visit once acute issues has resolved.     50 minutes spent on the date of the encounter doing chart review, review of test results, interpretation of tests, patient visit, documentation, and discussion with  other provider(s)     Ashli Nunez), PAChakaC

## 2023-08-10 NOTE — NURSING NOTE
Is the patient currently in the state of [place]? {YES OR NO:402321}    Visit mode:{VISIT MODE:472396}    If the visit is dropped, the patient can be reconnected by: {VIDEO VISIT INVITE:652132}    Will anyone else be joining the visit? {YES or NO:259612}  {If patient encounters technical issues they should call [phone] :781399}    How would you like to obtain your AVS? {AVS Preference:661258}    Are changes needed to the allergy or medication list? {YES OR NO:109814}    Reason for visit: No chief complaint on file.

## 2023-08-11 ENCOUNTER — APPOINTMENT (OUTPATIENT)
Dept: ULTRASOUND IMAGING | Facility: CLINIC | Age: 58
DRG: 682 | End: 2023-08-11
Payer: MEDICARE

## 2023-08-11 ENCOUNTER — APPOINTMENT (OUTPATIENT)
Dept: PHYSICAL THERAPY | Facility: CLINIC | Age: 58
DRG: 682 | End: 2023-08-11
Payer: MEDICARE

## 2023-08-11 ENCOUNTER — DOCUMENTATION ONLY (OUTPATIENT)
Dept: OTHER | Facility: CLINIC | Age: 58
End: 2023-08-11

## 2023-08-11 LAB
ALBUMIN UR-MCNC: 30 MG/DL
AMORPH CRY #/AREA URNS HPF: ABNORMAL /HPF
ANION GAP SERPL CALCULATED.3IONS-SCNC: 17 MMOL/L (ref 7–15)
APPEARANCE UR: CLEAR
ATRIAL RATE - MUSE: 97 BPM
B-OH-BUTYR SERPL-SCNC: 1.3 MMOL/L
BASE EXCESS BLDV CALC-SCNC: -3.9 MMOL/L (ref -7.7–1.9)
BASE EXCESS BLDV CALC-SCNC: -5.4 MMOL/L (ref -7.7–1.9)
BASE EXCESS BLDV CALC-SCNC: -5.6 MMOL/L (ref -7.7–1.9)
BILIRUB UR QL STRIP: NEGATIVE
BUN SERPL-MCNC: 62 MG/DL (ref 8–23)
C PNEUM DNA SPEC QL NAA+PROBE: NOT DETECTED
CALCIUM SERPL-MCNC: 8 MG/DL (ref 8.6–10)
CHLORIDE SERPL-SCNC: 109 MMOL/L (ref 98–107)
CK SERPL-CCNC: 153 U/L (ref 39–308)
COLOR UR AUTO: ABNORMAL
CREAT SERPL-MCNC: 4.29 MG/DL (ref 0.67–1.17)
DEPRECATED HCO3 PLAS-SCNC: 16 MMOL/L (ref 22–29)
DIASTOLIC BLOOD PRESSURE - MUSE: NORMAL MMHG
ERYTHROCYTE [DISTWIDTH] IN BLOOD BY AUTOMATED COUNT: 13.4 % (ref 10–15)
FLUAV H1 2009 PAND RNA SPEC QL NAA+PROBE: NOT DETECTED
FLUAV H1 RNA SPEC QL NAA+PROBE: NOT DETECTED
FLUAV H3 RNA SPEC QL NAA+PROBE: NOT DETECTED
FLUAV RNA SPEC QL NAA+PROBE: NOT DETECTED
FLUBV RNA SPEC QL NAA+PROBE: NOT DETECTED
GFR SERPL CREATININE-BSD FRML MDRD: 15 ML/MIN/1.73M2
GLUCOSE BLDC GLUCOMTR-MCNC: 103 MG/DL (ref 70–99)
GLUCOSE BLDC GLUCOMTR-MCNC: 107 MG/DL (ref 70–99)
GLUCOSE BLDC GLUCOMTR-MCNC: 120 MG/DL (ref 70–99)
GLUCOSE BLDC GLUCOMTR-MCNC: 122 MG/DL (ref 70–99)
GLUCOSE BLDC GLUCOMTR-MCNC: 128 MG/DL (ref 70–99)
GLUCOSE BLDC GLUCOMTR-MCNC: 151 MG/DL (ref 70–99)
GLUCOSE SERPL-MCNC: 119 MG/DL (ref 70–99)
GLUCOSE UR STRIP-MCNC: 300 MG/DL
HADV DNA SPEC QL NAA+PROBE: NOT DETECTED
HBV SURFACE AB SERPL IA-ACNC: 50.93 M[IU]/ML
HBV SURFACE AB SERPL IA-ACNC: REACTIVE M[IU]/ML
HBV SURFACE AG SERPL QL IA: NONREACTIVE
HCO3 BLDV-SCNC: 19 MMOL/L (ref 21–28)
HCO3 BLDV-SCNC: 20 MMOL/L (ref 21–28)
HCO3 BLDV-SCNC: 21 MMOL/L (ref 21–28)
HCOV PNL SPEC NAA+PROBE: NOT DETECTED
HCT VFR BLD AUTO: 30.8 % (ref 40–53)
HGB BLD-MCNC: 10 G/DL (ref 13.3–17.7)
HGB UR QL STRIP: ABNORMAL
HMPV RNA SPEC QL NAA+PROBE: NOT DETECTED
HOLD SPECIMEN: NORMAL
HPIV1 RNA SPEC QL NAA+PROBE: NOT DETECTED
HPIV2 RNA SPEC QL NAA+PROBE: NOT DETECTED
HPIV3 RNA SPEC QL NAA+PROBE: NOT DETECTED
HPIV4 RNA SPEC QL NAA+PROBE: NOT DETECTED
HYALINE CASTS: 1 /LPF
INTERPRETATION ECG - MUSE: NORMAL
KETONES UR STRIP-MCNC: NEGATIVE MG/DL
LEUKOCYTE ESTERASE UR QL STRIP: NEGATIVE
M PNEUMO DNA SPEC QL NAA+PROBE: NOT DETECTED
MAGNESIUM SERPL-MCNC: 1.6 MG/DL (ref 1.7–2.3)
MAGNESIUM SERPL-MCNC: 1.8 MG/DL (ref 1.7–2.3)
MCH RBC QN AUTO: 31.1 PG (ref 26.5–33)
MCHC RBC AUTO-ENTMCNC: 32.5 G/DL (ref 31.5–36.5)
MCV RBC AUTO: 96 FL (ref 78–100)
MUCOUS THREADS #/AREA URNS LPF: PRESENT /LPF
NITRATE UR QL: NEGATIVE
O2/TOTAL GAS SETTING VFR VENT: 21 %
O2/TOTAL GAS SETTING VFR VENT: 21 %
O2/TOTAL GAS SETTING VFR VENT: 22 %
P AXIS - MUSE: 25 DEGREES
PCO2 BLDV: 31 MM HG (ref 40–50)
PCO2 BLDV: 35 MM HG (ref 40–50)
PCO2 BLDV: 39 MM HG (ref 40–50)
PH BLDV: 7.32 [PH] (ref 7.32–7.43)
PH BLDV: 7.38 [PH] (ref 7.32–7.43)
PH BLDV: 7.39 [PH] (ref 7.32–7.43)
PH UR STRIP: 5 [PH] (ref 5–7)
PLATELET # BLD AUTO: 106 10E3/UL (ref 150–450)
PO2 BLDV: 22 MM HG (ref 25–47)
PO2 BLDV: 26 MM HG (ref 25–47)
PO2 BLDV: 39 MM HG (ref 25–47)
POTASSIUM SERPL-SCNC: 3.9 MMOL/L (ref 3.4–5.3)
POTASSIUM SERPL-SCNC: 4.1 MMOL/L (ref 3.4–5.3)
POTASSIUM SERPL-SCNC: 4.1 MMOL/L (ref 3.4–5.3)
POTASSIUM SERPL-SCNC: 4.4 MMOL/L (ref 3.4–5.3)
POTASSIUM SERPL-SCNC: 4.7 MMOL/L (ref 3.4–5.3)
PR INTERVAL - MUSE: 142 MS
QRS DURATION - MUSE: 70 MS
QT - MUSE: 350 MS
QTC - MUSE: 444 MS
R AXIS - MUSE: 14 DEGREES
RBC # BLD AUTO: 3.22 10E6/UL (ref 4.4–5.9)
RBC URINE: 0 /HPF
RSV RNA SPEC QL NAA+PROBE: NOT DETECTED
RSV RNA SPEC QL NAA+PROBE: NOT DETECTED
RV+EV RNA SPEC QL NAA+PROBE: DETECTED
SODIUM SERPL-SCNC: 142 MMOL/L (ref 136–145)
SODIUM UR-SCNC: 77 MMOL/L
SP GR UR STRIP: 1.01 (ref 1–1.03)
SYSTOLIC BLOOD PRESSURE - MUSE: NORMAL MMHG
T AXIS - MUSE: 57 DEGREES
TROPONIN T SERPL HS-MCNC: 46 NG/L
UROBILINOGEN UR STRIP-MCNC: NORMAL MG/DL
VANCOMYCIN SERPL-MCNC: 18.6 UG/ML
VENTRICULAR RATE- MUSE: 97 BPM
WBC # BLD AUTO: 4.8 10E3/UL (ref 4–11)
WBC URINE: 3 /HPF

## 2023-08-11 PROCEDURE — 85027 COMPLETE CBC AUTOMATED: CPT

## 2023-08-11 PROCEDURE — 250N000013 HC RX MED GY IP 250 OP 250 PS 637

## 2023-08-11 PROCEDURE — 84300 ASSAY OF URINE SODIUM: CPT

## 2023-08-11 PROCEDURE — 250N000011 HC RX IP 250 OP 636

## 2023-08-11 PROCEDURE — 80202 ASSAY OF VANCOMYCIN: CPT

## 2023-08-11 PROCEDURE — 93010 ELECTROCARDIOGRAM REPORT: CPT | Performed by: INTERNAL MEDICINE

## 2023-08-11 PROCEDURE — 93005 ELECTROCARDIOGRAM TRACING: CPT

## 2023-08-11 PROCEDURE — 84132 ASSAY OF SERUM POTASSIUM: CPT

## 2023-08-11 PROCEDURE — 84132 ASSAY OF SERUM POTASSIUM: CPT | Performed by: STUDENT IN AN ORGANIZED HEALTH CARE EDUCATION/TRAINING PROGRAM

## 2023-08-11 PROCEDURE — 200N000002 HC R&B ICU UMMC

## 2023-08-11 PROCEDURE — 83735 ASSAY OF MAGNESIUM: CPT

## 2023-08-11 PROCEDURE — 36415 COLL VENOUS BLD VENIPUNCTURE: CPT

## 2023-08-11 PROCEDURE — 99223 1ST HOSP IP/OBS HIGH 75: CPT | Performed by: NURSE PRACTITIONER

## 2023-08-11 PROCEDURE — 82803 BLOOD GASES ANY COMBINATION: CPT

## 2023-08-11 PROCEDURE — 84484 ASSAY OF TROPONIN QUANT: CPT

## 2023-08-11 PROCEDURE — 81001 URINALYSIS AUTO W/SCOPE: CPT | Performed by: EMERGENCY MEDICINE

## 2023-08-11 PROCEDURE — 97530 THERAPEUTIC ACTIVITIES: CPT | Mod: GP | Performed by: PHYSICAL THERAPIST

## 2023-08-11 PROCEDURE — 86706 HEP B SURFACE ANTIBODY: CPT | Performed by: INTERNAL MEDICINE

## 2023-08-11 PROCEDURE — 93970 EXTREMITY STUDY: CPT

## 2023-08-11 PROCEDURE — 93971 EXTREMITY STUDY: CPT

## 2023-08-11 PROCEDURE — 97116 GAIT TRAINING THERAPY: CPT | Mod: GP | Performed by: PHYSICAL THERAPIST

## 2023-08-11 PROCEDURE — 999N000111 HC STATISTIC OT IP EVAL DEFER

## 2023-08-11 PROCEDURE — 87340 HEPATITIS B SURFACE AG IA: CPT | Performed by: INTERNAL MEDICINE

## 2023-08-11 PROCEDURE — 250N000012 HC RX MED GY IP 250 OP 636 PS 637

## 2023-08-11 PROCEDURE — 36415 COLL VENOUS BLD VENIPUNCTURE: CPT | Performed by: INTERNAL MEDICINE

## 2023-08-11 PROCEDURE — 87040 BLOOD CULTURE FOR BACTERIA: CPT

## 2023-08-11 PROCEDURE — 99233 SBSQ HOSP IP/OBS HIGH 50: CPT | Mod: GC | Performed by: STUDENT IN AN ORGANIZED HEALTH CARE EDUCATION/TRAINING PROGRAM

## 2023-08-11 PROCEDURE — 97161 PT EVAL LOW COMPLEX 20 MIN: CPT | Mod: GP | Performed by: PHYSICAL THERAPIST

## 2023-08-11 PROCEDURE — 82310 ASSAY OF CALCIUM: CPT

## 2023-08-11 PROCEDURE — 36415 COLL VENOUS BLD VENIPUNCTURE: CPT | Performed by: STUDENT IN AN ORGANIZED HEALTH CARE EDUCATION/TRAINING PROGRAM

## 2023-08-11 PROCEDURE — 99232 SBSQ HOSP IP/OBS MODERATE 35: CPT | Performed by: INTERNAL MEDICINE

## 2023-08-11 RX ORDER — DEXTROSE MONOHYDRATE 25 G/50ML
25-50 INJECTION, SOLUTION INTRAVENOUS
Status: DISCONTINUED | OUTPATIENT
Start: 2023-08-11 | End: 2023-08-11

## 2023-08-11 RX ORDER — LAMIVUDINE 100 MG/1
100 TABLET, FILM COATED ORAL DAILY
Status: DISCONTINUED | OUTPATIENT
Start: 2023-08-11 | End: 2023-08-16 | Stop reason: HOSPADM

## 2023-08-11 RX ORDER — ONDANSETRON 2 MG/ML
4 INJECTION INTRAMUSCULAR; INTRAVENOUS EVERY 6 HOURS PRN
Status: DISCONTINUED | OUTPATIENT
Start: 2023-08-11 | End: 2023-08-16 | Stop reason: HOSPADM

## 2023-08-11 RX ORDER — NICOTINE POLACRILEX 4 MG
15-30 LOZENGE BUCCAL
Status: DISCONTINUED | OUTPATIENT
Start: 2023-08-11 | End: 2023-08-11

## 2023-08-11 RX ORDER — MAGNESIUM OXIDE 400 MG/1
400 TABLET ORAL EVERY 4 HOURS
Status: COMPLETED | OUTPATIENT
Start: 2023-08-11 | End: 2023-08-11

## 2023-08-11 RX ORDER — LAMIVUDINE 10 MG/ML
25 SOLUTION ORAL DAILY
Status: DISCONTINUED | OUTPATIENT
Start: 2023-08-11 | End: 2023-08-16

## 2023-08-11 RX ORDER — MYCOPHENOLATE MOFETIL 250 MG/1
500 CAPSULE ORAL EVERY 12 HOURS
Status: DISCONTINUED | OUTPATIENT
Start: 2023-08-11 | End: 2023-08-16 | Stop reason: HOSPADM

## 2023-08-11 RX ORDER — PROCHLORPERAZINE MALEATE 5 MG
5 TABLET ORAL EVERY 6 HOURS PRN
Status: DISCONTINUED | OUTPATIENT
Start: 2023-08-11 | End: 2023-08-16 | Stop reason: HOSPADM

## 2023-08-11 RX ORDER — ONDANSETRON 4 MG/1
4 TABLET, ORALLY DISINTEGRATING ORAL EVERY 6 HOURS PRN
Status: DISCONTINUED | OUTPATIENT
Start: 2023-08-11 | End: 2023-08-16 | Stop reason: HOSPADM

## 2023-08-11 RX ORDER — HEPARIN SODIUM 5000 [USP'U]/.5ML
5000 INJECTION, SOLUTION INTRAVENOUS; SUBCUTANEOUS EVERY 12 HOURS
Status: DISCONTINUED | OUTPATIENT
Start: 2023-08-11 | End: 2023-08-11

## 2023-08-11 RX ORDER — MAGNESIUM OXIDE 400 MG/1
400 TABLET ORAL EVERY 4 HOURS
Status: COMPLETED | OUTPATIENT
Start: 2023-08-11 | End: 2023-08-12

## 2023-08-11 RX ORDER — VITAMIN B COMPLEX
50 TABLET ORAL DAILY
Status: DISCONTINUED | OUTPATIENT
Start: 2023-08-11 | End: 2023-08-16 | Stop reason: HOSPADM

## 2023-08-11 RX ORDER — NICOTINE POLACRILEX 4 MG
15-30 LOZENGE BUCCAL
Status: DISCONTINUED | OUTPATIENT
Start: 2023-08-11 | End: 2023-08-16 | Stop reason: HOSPADM

## 2023-08-11 RX ORDER — DEXTROSE MONOHYDRATE 25 G/50ML
25-50 INJECTION, SOLUTION INTRAVENOUS
Status: DISCONTINUED | OUTPATIENT
Start: 2023-08-11 | End: 2023-08-16 | Stop reason: HOSPADM

## 2023-08-11 RX ORDER — ASPIRIN 81 MG/1
81 TABLET ORAL DAILY
Status: DISCONTINUED | OUTPATIENT
Start: 2023-08-11 | End: 2023-08-16 | Stop reason: HOSPADM

## 2023-08-11 RX ADMIN — ACETAMINOPHEN 650 MG: 325 TABLET, FILM COATED ORAL at 21:54

## 2023-08-11 RX ADMIN — PREDNISONE 5 MG: 5 TABLET ORAL at 08:14

## 2023-08-11 RX ADMIN — HEPARIN SODIUM 5000 UNITS: 5000 INJECTION, SOLUTION INTRAVENOUS; SUBCUTANEOUS at 16:59

## 2023-08-11 RX ADMIN — MAGNESIUM OXIDE TAB 400 MG (241.3 MG ELEMENTAL MG) 400 MG: 400 (241.3 MG) TAB at 21:14

## 2023-08-11 RX ADMIN — PROCHLORPERAZINE MALEATE 5 MG: 5 TABLET ORAL at 21:54

## 2023-08-11 RX ADMIN — MYCOPHENOLATE MOFETIL 500 MG: 250 CAPSULE ORAL at 21:14

## 2023-08-11 RX ADMIN — MAGNESIUM OXIDE TAB 400 MG (241.3 MG ELEMENTAL MG) 400 MG: 400 (241.3 MG) TAB at 16:58

## 2023-08-11 RX ADMIN — INSULIN ASPART 1 UNITS: 100 INJECTION, SOLUTION INTRAVENOUS; SUBCUTANEOUS at 12:05

## 2023-08-11 RX ADMIN — PANTOPRAZOLE SODIUM 40 MG: 40 TABLET, DELAYED RELEASE ORAL at 08:14

## 2023-08-11 RX ADMIN — ACETAMINOPHEN 650 MG: 325 TABLET, FILM COATED ORAL at 05:21

## 2023-08-11 RX ADMIN — ONDANSETRON 4 MG: 4 TABLET, ORALLY DISINTEGRATING ORAL at 18:24

## 2023-08-11 RX ADMIN — TAMSULOSIN HYDROCHLORIDE 0.4 MG: 0.4 CAPSULE ORAL at 08:14

## 2023-08-11 RX ADMIN — HEPARIN SODIUM 5000 UNITS: 5000 INJECTION, SOLUTION INTRAVENOUS; SUBCUTANEOUS at 08:14

## 2023-08-11 RX ADMIN — LAMIVUDINE 25 MG: 10 SOLUTION ORAL at 21:14

## 2023-08-11 RX ADMIN — Medication 50 MCG: at 16:59

## 2023-08-11 RX ADMIN — INSULIN DEGLUDEC INJECTION 15 UNITS: 100 INJECTION, SOLUTION SUBCUTANEOUS at 21:54

## 2023-08-11 RX ADMIN — ASPIRIN 81 MG: 81 TABLET, COATED ORAL at 08:14

## 2023-08-11 RX ADMIN — MAGNESIUM OXIDE TAB 400 MG (241.3 MG ELEMENTAL MG) 400 MG: 400 (241.3 MG) TAB at 12:01

## 2023-08-11 ASSESSMENT — ACTIVITIES OF DAILY LIVING (ADL)
ADLS_ACUITY_SCORE: 28
ADLS_ACUITY_SCORE: 28
ADLS_ACUITY_SCORE: 25
ADLS_ACUITY_SCORE: 28
ADLS_ACUITY_SCORE: 25
ADLS_ACUITY_SCORE: 25
ADLS_ACUITY_SCORE: 28
ADLS_ACUITY_SCORE: 25

## 2023-08-11 NOTE — CONSULTS
Nephrology Initial Consult  August 10, 2023      Frandy Workman MRN:[id number] YOB: 1965  Date of Admission:8/10/2023  Primary care provider: Lamin Ortiz  Requesting physician: Sera Thorne MD    ASSESSMENT AND RECOMMENDATIONS:   Miller Workman is a 58 year old man with a PMH of DMII, bipolar disorder, cirrhosis s/t ESTRADA with subsequent development of HCC, s/p liver transplant , HLD, HTN, GERD, BPH and CAD with hx of 2 vessel CABG in July 2021, CKD with anemia of chronic disease, on erythropoetin.  Recently he was diagnosed with metastatic lesions in the peritoneum for this he was started on sorafenib on 7/29/2023.Since 4 days he started to have frequent diarrhea and vomiting when started taking Zofran.Diarrhea and vomitting stopped now 36 hours but has abdominal discomfort, continued to have fatigue and dizziness.he also noted reductaion in urine output. The reason for nephrology consult is for raised creatinine and hyperkalemia.    #JERONIMO ON CKD   Baseline creatinine was 1.88 in 6/14/2023 and now the creatinine raised to 6.5 mg/dl.  Clinically he was dry and blood pressure is soft 94/54.he was bolused with 1000ml saline.BP improved to 116/56.Moreover he is oligo anuric since last night.It is likely that he has progressed to ATN on top of CKD  due to diabatic kidney disease.Diarrhea and Vomitting was presumed to be associated with Sorafenib,this was held by onco.Despite fluid bolus and continued sodium bicarbonate 150meq/1000 dw 100/hr ,he remained anuric .    - Blous 500ml 0.95 NS  - Emergency dialysis   - Input and out put monitoring       # Hyperkalemia   # Severe metabolic acidosis  Poassium from this evening 7.7 with ECG finding of hyperacute T wave. he was given calcium gluconate and the potassium shifted with insulin and dextrose.  The potassium seems way high for the kidney dysfunction,specially in setting of GI loss .Historically he has not eating enough for four days ,lost  around 8 LB over the same period.Hence starvation ketosis  and ketosis from poor sugar control might have contributed to hyperkalemia from shifting potassium out of the cells.  PH- 7.08 on 18;44 ,despite sodium bicarbonate infusion ,it remained 7.07 at 20:18.C02 - 8.    - Continue NaHco3 150 meq /1000ml dw at 100ml/hr   - Emergency dialysis   -Hold metoprolol   - K ,ABG - 2hourly          Recommendations were communicated to primary team via note and verbal    discussed with Dr. FELIPA Tovar MD  Division of Renal Disease and Hypertension  [facility]  [email or web address]  Vocera Web Console    I discussed the patient with Dr. Tovar. The degree of hyperkalemia and JERONIMO and acidosis warrants emergent dialysis. We discussed a treatment plan and dialysis plan. Charlene Black MD MS FNKF      REASON FOR CONSULT: JERONIMO and Hyperkalemia     HISTORY OF PRESENT ILLNESS:  Admitting provider and nursing notes reviewed  Frandy Workman is a 58 year old man with a PMH of DMII, bipolar disorder, cirrhosis s/t ESTRADA with subsequent development of HCC, s/p liver transplant , HLD, HTN, GERD, BPH and CAD with hx of 2 vessel CABG in July 2021, CKD with anemia of chronic disease, on erythropoetin.      He as diagnosed with hepatocellular carcinoma in December 2018 when he was found to have 2 LIRADS 5 lesions on surveillance imaging. He underwent TACE on 1/22/19.  He is s/p  liver transplant on 11/11/2019. The explanted liver had 2 lesions that were mostly necrotic and less than 3 cm with no clearly identifiable vascular invasion.       He was found to have peritoneal masses on routine surveillance imaging in June 2023. He underwent laparoscopy with biopsies and pericecal mass resection confirming metastatic HCC. He started  with Sorafenib 400 mg BID on 7/29/23. He started to have nausea/vomiting and diarrhea about 4 days ago. 2 days ago it was the worse with 5x emesis and the same amount or more of diarrhea. He started taking zofran and has  not had any recurrence of either in 1.5 days. However he has not had any intake in 4 days. Only had about 2 sips of Sprite today. He feels dizzy ,and very minimal urine output since last night .    PAST MEDICAL HISTORY:  Reviewed with patient on 08/10/2023     Past Medical History:   Diagnosis Date    Anemia 2013    Arthritis     BPH (benign prostatic hyperplasia)     CAD (coronary artery disease) 04/01/2019    Cholelithiasis     Conductive hearing loss 08/16/2017    Depressive disorder 1986    Suffer effects throughout life    Gastroesophageal reflux disease 12/01/2014    HCC (hepatocellular carcinoma) (H) 01/22/2019    History of diabetic retinopathy 07/2018    HTN (hypertension) 11/20/2019    Hyperlipidemia     Liver cirrhosis secondary to ESTRADA (H)     Liver transplanted (H) 11/11/2019    Portal vein thrombosis 04/11/2019    Type II diabetes mellitus (H)        Past Surgical History:   Procedure Laterality Date    BYPASS GRAFT ARTERY CORONARY N/A 7/14/2021    Procedure: median sternotomy, on cardiopulmonary bypass, CORONARY ARTERY BYPASS GRAFT (CABG) x2 with left greater saphenous vein endoscopic harvest and left internal mammery artery harvest;  Surgeon: Tom Zapata MD;  Location: [facility] OR    COLONOSCOPY      2015    COLONOSCOPY N/A 12/6/2019    Procedure: COLONOSCOPY, WITH POLYPECTOMY AND BIOPSY;  Surgeon: Adam Morton MD;  Location: [facility] GI    CV CENTRAL VENOUS CATHETER PLACEMENT N/A 7/12/2021    Procedure: Central Venous Catheter Placement;  Surgeon: Fermin Polanco MD;  Location:  [facility] CATH LAB    CV CORONARY ANGIOGRAM N/A 7/12/2021    Procedure: Coronary Angiogram;  Surgeon: Fermin Polanco MD;  Location:  HE[facility] CATH LAB    CV HEART CATHETERIZATION WITH POSSIBLE INTERVENTION N/A 2/26/2019    Procedure: CORS;  Surgeon: Jagdish Hoyt MD;  Location:  HEART CARDI[facility] CATH LAB    CV INTRA AORTIC BALLOON N/A 7/12/2021    Procedure: Intra  Aortic Balloon Pump Insertion;  Surgeon: Fermin Polanco MD;  Location:  HEART CARDIAC CATH LAB    ESOPHAGOSCOPY, GASTROSCOPY, DUODENOSCOPY (EGD), COMBINED N/A 11/17/2016    Procedure: COMBINED ESOPHAGOSCOPY, GASTROSCOPY, DUODENOSCOPY (EGD);  Surgeon: Santi Rosas MD;  Location:  GI    ESOPHAGOSCOPY, GASTROSCOPY, DUODENOSCOPY (EGD), COMBINED N/A 11/17/2017    Procedure: COMBINED ESOPHAGOSCOPY, GASTROSCOPY, DUODENOSCOPY (EGD);  EGD;  Surgeon: Santi Rosas MD;  Location:  GI    ESOPHAGOSCOPY, GASTROSCOPY, DUODENOSCOPY (EGD), COMBINED N/A 12/28/2018    Procedure: EGD;  Surgeon: Santi Rosas MD;  Location:  OR    ESOPHAGOSCOPY, GASTROSCOPY, DUODENOSCOPY (EGD), COMBINED N/A 12/6/2019    Procedure: ESOPHAGOGASTRODUODENOSCOPY, WITH BIOPSY;  Surgeon: Adam Morton MD;  Location:  GI    ESOPHAGOSCOPY, GASTROSCOPY, DUODENOSCOPY (EGD), COMBINED N/A 2/13/2020    Procedure: ESOPHAGOGASTRODUODENOSCOPY (EGD);  Surgeon: Santi Rosas MD;  Location:  GI    EXCISE MASS ABDOMEN N/A 7/13/2023    Procedure: Laparoscopic lysis of adhesions, laparoscopic resection of abdominal mass;  Surgeon: Ruiz Chu MD;  Location:  OR    HEAD & NECK SURGERY      12/2017 at [facility].     IMPLANT GOLD WEIGHT EYELID Right 11/16/2017    Procedure: IMPLANT WEIGHT EYELID;  Right Upper Eyelid Weight, right tarsal strip lower eyelid;  Surgeon: Milana Malave MD;  [facility]    IR CHEMO EMBOLIZATION  1/22/2019    KNEE SURGERY Left     ORTHOPEDIC SURGERY      PAROTIDECTOMY, RADICAL NECK DISSECTION Right 11/2/2017    Procedure: PAROTIDECTOMY, RADICAL NECK DISSECTION;  Right Superfacial Parotidectomy , Facial nerve repair. with [facility] facial nerve monitor.;  Surgeon: Asiya Morgan MD;  Location:  OR    PHACOEMULSIFICATION CLEAR CORNEA WITH STANDARD INTRAOCULAR LENS IMPLANT Right 1/24/2023    Procedure: PHACOEMULSIFICATION, COMPLEX CATARACT, WITH INTRAOCULAR LENS IMPLANT  WITH TRYPAN RIGHT EYE;  Surgeon: Enriqueta Martin MD;  Location: [facility] OR    PHACOEMULSIFICATION WITH STANDARD INTRAOCULAR LENS IMPLANT Left 1/3/2023    Procedure: PHACOEMULSIFICATION, COMPLEX CATARACT, WITH STANDARD INTRAOCULAR LENS IMPLANT INSERTION LEFT EYE;  Surgeon: Enriqueta Martin MD;  Location: [facility] OR    PICC INSERTION Left 2017    4fr SL BioFlo PICC, 44cm in the L basilic vein w/ tip in the low SVC    RETURN LIVER TRANSPLANT N/A 2019    Procedure: Exploratory laparotomy, hematoma evacuation, abdominal washout;  Surgeon: Александр Ramos MD;  Location: [facility] OR    TRANSPLANT LIVER RECIPIENT  DONOR N/A 2019    Procedure: TRANSPLANT, LIVER, RECIPIENT,  DONOR;  Surgeon: Александр Ramos MD;  Location: [facility] OR    VASCULAR SURGERY          MEDICATIONS:  PTA Meds  Prior to Admission medications    Medication Sig Last Dose Taking? Auth Provider Long Term End Date   Alcohol Swabs (ALCOHOL PREP) 70 % PADS Use for glucose testing 4x daily  and insulin administration 4x daily.  Yes Frannie Vasquez PA-C     ammonium lactate (AMLACTIN) 12 % external cream Apply topically daily as needed for dry skin (on feet) More than a month at unknown Yes Reported, Patient     aspirin (SM ASPIRIN ADULT LOW STRENGTH) 81 MG EC tablet Take 2 tablets (162 mg) by mouth daily 2023 at pm Yes Layton Romero MD No    BD VIKTORIA U/F 32G X 4 MM insulin pen needle Use 5 per day  Yes Frannie Vasquez PA-C     benzoyl peroxide 5 % external liquid Use topically in showers as a body wash 8/10/2023 at am Yes Omar Lyon MD     Continuous Blood Gluc Sensor (FREESTYLE CLAUDIA 2 SENSOR) [facility] See Admin Instructions Change every 14 days. 8/3/2023 at unknown Yes Frannie Vasquez PA-C Yes    empagliflozin (JARDIANCE) 10 MG TABS tablet Take 1 tablet (10 mg) by mouth daily 8/10/2023 at am Yes Frannie Vasquez PA-C No    insulin aspart (NOVOLOG PEN) 100 UNIT/ML pen Inject 5-10 Units Subcutaneous  4 times daily (with meals and nightly) 1unit : 8 g carb before meals.  Also add 1 unit : 50 mg/dl >180 before meals and at bedtime.  Yes Frannie Vasquez PA-C Yes    insulin degludec (TRESIBA FLEXTOUCH) 100 UNIT/ML pen Inject 34 units subcutaneous once daily 8/10/2023 at unknown Yes Frannie Vasquez PA-C No    ketoconazole (NIZORAL) 2 % external shampoo Apply thin layer topically to scalp in shower (leave on 5 min prior to rinse); may also use as a body wash 8/10/2023 at unknown Yes Omar Lyon MD     lamiVUDine (EPIVIR) 100 MG tablet Take 1 tablet (100 mg) by mouth daily 8/10/2023 at unknown Yes Santi Rosas MD     lisinopril (ZESTRIL) 10 MG tablet Take 1 tablet (10 mg) by mouth daily 8/10/2023 at unknown Yes Eloy Rao MD Yes    metFORMIN (GLUCOPHAGE XR) 500 MG 24 hr tablet Take 500 mg by mouth daily 8/10/2023 at unknown Yes Reported, Patient No    metoprolol succinate ER (TOPROL XL) 25 MG 24 hr tablet Take 0.5 tablets (12.5 mg) by mouth daily 8/9/2023 at pm Yes Manjeet Ireland MD Yes    Multiple Vitamin (TAB-A-GLADIS) TABS TAKE ONE TABLET BY MOUTH ONCE DAILY 8/10/2023 at unknown Yes Lamin Ortiz MD     mycophenolate (GENERIC EQUIVALENT) 250 MG capsule Take 2 capsules (500 mg) by mouth every 12 hours 8/10/2023 at unnown Yes Santi Rosas MD Yes    ondansetron (ZOFRAN ODT) 8 MG ODT tab Take 1 tablet (8 mg) by mouth every 8 hours as needed for nausea 8/10/2023 at 0730 Yes Lamin Ortiz MD No    pantoprazole (PROTONIX) 40 MG EC tablet Take 1 tablet (40 mg) by mouth daily before breakfast 8/10/2023 at am Yes Santi Rosas MD     predniSONE (DELTASONE) 5 MG tablet Take 1 tablet (5 mg) by mouth daily 8/10/2023 at am Yes Santi Rosas MD No    rosuvastatin (CRESTOR) 20 MG tablet Take 1 tablet (20 mg) by mouth daily 8/9/2023 at pm Yes Manjeet Ireland MD Yes    tamsulosin (FLOMAX) 0.4 MG capsule Take 1 capsule (0.4 mg) by mouth daily  8/10/2023 at am Yes Lamin Ortiz MD No    Vitamin D3 50 mcg (2000 units) tablet Take 1 tablet (50 mcg) by mouth daily 8/10/2023 at am Yes Bentley Brunner MD        Current Meds   sodium chloride 0.9%  1,000 mL Intravenous Once    aflibercept  2 mg Intravitreal Q28 Days    aflibercept  2 mg Intravitreal Q28 Days    dexAMETHasone  0.7 mg Intravitreal Q2 Months    dexAMETHasone  0.7 mg Intravitreal Q2 Months    faricimab-svoa  6 mg Intravitreal q28 days    faricimab-svoa  6 mg Intravitreal q28 days    lidocaine (PF)  1 mL Ophthalmic Q2 Months    piperacillin-tazobactam  2.25 g Intravenous Q6H    vancomycin  2,000 mg Intravenous Once    vancomycin place sahni - receiving intermittent dosing  1 each Intravenous See Admin Instructions     Infusion Meds   sodium bicarbonate 150 mEq in D5W 1,000 mL infusion 100 mL/hr at 08/10/23 1925       ALLERGIES:    Allergies   Allergen Reactions    Codeine Other (See Comments)     Cannot take due to liver  Cannot tolerate oral narcotics    Seasonal Allergies      Sneezing, coughing, runny and itchy eyes       REVIEW OF SYSTEMS:  A comprehensive of systems was negative except as noted above.    SOCIAL HISTORY:   Social History     Socioeconomic History    Marital status:      Spouse name: Not on file    Number of children: Not on file    Years of education: Not on file    Highest education level: Bachelor's degree (e.g., BA, AB, BS)   Occupational History    Not on file   Tobacco Use    Smoking status: Former     Packs/day: 6.00     Years: 30.00     Pack years: 180.00     Types: Cigars, Cigarettes     Start date: 2016     Quit date: 10/25/2017     Years since quittin.7     Passive exposure: Past    Smokeless tobacco: Former     Types: Chew     Quit date: 10/31/2017    Tobacco comments:     1 tin per week   Vaping Use    Vaping Use: Never used   Substance and Sexual Activity    Alcohol use: No     Alcohol/week: 0.0 standard drinks of alcohol     Comment: quit Sept.  1996    Drug use: No    Sexual activity: Not Currently     Partners: Female     Birth control/protection: Condom   Other Topics Concern    Parent/sibling w/ CABG, MI or angioplasty before 65F 55M? Yes   Social History Narrative    Prior [facility], [facility]     Social Determinants of Health     Financial Resource Strain: Low Risk  (10/13/2020)    Overall Financial Resource Strain (CARDIA)     Difficulty of Paying Living Expenses: Not very hard   Food Insecurity: No Food Insecurity (10/13/2020)    Hunger Vital Sign     Worried About Running Out of Food in the Last Year: Never true     Ran Out of Food in the Last Year: Never true   Transportation Needs: No Transportation Needs (10/13/2020)    PRAPARE - Transportation     Lack of Transportation (Medical): No     Lack of Transportation (Non-Medical): No   Physical Activity: Insufficiently Active (10/13/2020)    Exercise Vital Sign     Days of Exercise per Week: 1 day     Minutes of Exercise per Session: 60 min   Stress: Stress Concern Present (10/13/2020)    Citizen of [place] of Occupational Health - Occupational Stress Questionnaire     Feeling of Stress : To some extent   Social Connections: Socially Isolated (10/13/2020)    Social Connection and Isolation Panel [NHANES]     Frequency of Communication with Friends and Family: Once a week     Frequency of Social Gatherings with Friends and Family: Once a week     Attends Jain Services: Never     Active Member of Clubs or Organizations: No     Attends Club or Organization Meetings: Never     Marital Status:    Intimate Partner Violence: Not At Risk (6/28/2023)    Humiliation, Afraid, Rape, and Kick questionnaire     Fear of Current or Ex-Partner: No     Emotionally Abused: No     Physically Abused: No     Sexually Abused: No   Housing Stability: Low Risk  (10/13/2020)    Housing Stability Vital Sign     Unable to Pay for Housing in the Last Year: No     Number of Places Lived in the Last Year: 2     Unstable  Housing in the Last Year: No     Reviewed with patient    accompanies Frandy Workman in hospital room    FAMILY MEDICAL HISTORY:   Family History   Problem Relation Age of Onset    Skin Cancer Mother     Cancer Mother         mastectomy    Diabetes Mother     Cerebrovascular Disease Mother     Thyroid Disease Mother     Depression Mother     Colon Cancer Father 60    Pancreatic Cancer Father 60    Prostate Cancer Father     Macular Degeneration Father     Glaucoma Father     Skin Cancer Father     Thyroid Disease Sister     Depression Sister     Asthma Sister     Sleep Apnea Brother     Colorectal Cancer Maternal Grandmother     Cancer Maternal Grandmother     Substance Abuse Maternal Grandmother         Alcohol    Prostate Cancer Maternal Grandfather     Substance Abuse Maternal Grandfather         Alcohol    Colorectal Cancer Paternal Grandmother     Liver Disease No family hx of     Melanoma No family hx of     Anesthesia Reaction No family hx of     Deep Vein Thrombosis (DVT) No family hx of      Reviewed with patient     PHYSICAL EXAM:   Temp  Av.7  F (37.1  C)  Min: 98.7  F (37.1  C)  Max: 98.7  F (37.1  C)      Pulse  Av.5  Min: 82  Max: 111 Resp  Av  Min: 20  Max: 20  SpO2  Av.5 %  Min: 85 %  Max: 98 %       BP 94/54   Pulse 111   Temp 98.7  F (37.1  C)   Resp 20   Wt 82.6 kg (182 lb)   SpO2 98%   BMI 26.88 kg/m        Admit Weight: 82.6 kg (182 lb)     GENERAL APPEARANCE: Not distress,  awake  EYES: dry mucosa , no scleral icterus, pupils equal  Lymphatics: no cervical or supraclavicular LAD  Pulmonary: lungs clear to auscultation with equal breath sounds bilaterally  CV: regular rhythm, normal rate, no rub   - JVD -ve   - Edema -ve  GI: soft, nontender, normal bowel sounds  MS: no evidence of inflammation in joints, no muscle tenderness  : no  gan  SKIN: no rash, warm, dry, no cyanosis  NEURO: face symmetric, no asterixis     LABS:   CMP  Recent Labs   Lab 08/10/23  2018  08/10/23  1844 08/10/23  1833 08/10/23  1024   NA  --  139 138 139   POTASSIUM  --  7.7* 8.0* 6.3*   CHLORIDE  --   --  112* 112*   CO2  --   --  8* 9*   ANIONGAP  --   --  18* 18*   * 122* 130* 180*   BUN  --   --  117.0* 107.0*   CR  --   --  7.38* 6.50*   GFRESTIMATED  --   --  8* 9*   DEBORAH  --   --  8.6 9.1   MAG  --   --  2.3 2.3   PHOS  --   --   --  6.0*   PROTTOTAL  --   --  7.3 7.6   ALBUMIN  --   --  4.3 4.4   BILITOTAL  --   --  0.3 0.4   ALKPHOS  --   --  108 110   AST  --   --  22 24   ALT  --   --  26 27     CBC  Recent Labs   Lab 08/10/23  1844 08/10/23  1833 08/10/23  1024   HGB 11.9* 11.9* 13.3   WBC  --  8.8 10.3   RBC  --  3.92* 4.25*   HCT 35* 39.9* 42.4   MCV  --  102* 100   MCH  --  30.4 31.3   MCHC  --  29.8* 31.4*   RDW  --  14.0 13.8   PLT  --  164 178     INR  Recent Labs   Lab 08/10/23  1833   INR 1.16*     ABGNo lab results found in last 7 days.   URINE STUDIES  Recent Labs   Lab Test 03/15/23  0915 01/11/22  0001 09/07/21  1115 12/04/19  1400   COLOR Straw Light Yellow Yellow Light Yellow   APPEARANCE Clear Clear Cloudy* Clear   URINEGLC >=1000* >=1000* 50* 30*   URINEBILI Negative Negative Negative Negative   URINEKETONE Negative Negative Negative Negative   SG 1.023 1.018 1.022 1.009   UBLD Trace* Negative Small* Negative   URINEPH 5.5 5.0 5.0 5.0   PROTEIN 50* Negative >499* 30*   NITRITE Negative Negative Negative Negative   LEUKEST Negative Negative Negative Negative   RBCU 1 <1 1 0   WBCU <1 1 1 1     Recent Labs   Lab Test 04/20/22  0919 10/04/21  0804 09/07/21  1115 02/26/21  0750 06/29/20  1008 03/02/20  1013 12/02/19  1040 07/08/19  1017 02/04/19  0705   UTPG 0.12 1.46* 1.17* 0.47* 0.89* 0.29* 1.22* 0.11 0.14     PTH  Recent Labs   Lab Test 03/15/23  0906 04/20/22  0912 10/04/21  0802 06/29/20  1005 07/08/19  1020   PTHI 78* 59 81* 61 54     IRON STUDIES  Recent Labs   Lab Test 04/25/22  0925 02/09/22  0850 11/16/21  1004 11/02/21  1031 07/12/21  1608 06/28/21  1347  06/04/21  1236 12/02/20  0739 11/11/20  0754 10/14/20  0836 09/16/20  0802 07/29/20  0749 06/29/20  1005 05/21/20  0723 04/22/20  0833 03/09/20  0823 01/27/20  1340 12/02/19  1034 12/28/18  1108 09/15/18  1215 06/09/17  1250   IRON 119 96 93 109  --  112 158 75 81 73 93 87 60 72 65 92  --  88  --  52  --     248 211* 241  --  226* 270 228* 227* 248 221* 230* 204* 192* 215* 231*  --  248  --  403  --    IRONSAT 42 39 44 45  --  50* 59* 33 36 29 42 38 29 38 30 40  --  36  --  13*  --    QUAN 508* 1,046* 400* 479* 543* 495* 399* 433* 286 323 223 193 352 344 643* 859* 690* 1,353* 90 10* 450*       IMAGING:  All imaging studies reviewed by me.     Courtney Tovar MD

## 2023-08-11 NOTE — PROCEDURES
[facility]    Dialysis Line    Date/Time: 8/10/2023 10:50 PM    Performed by: Toribio Oliva MD  Authorized by: Toribio Oliva MD  Indications: vascular access (Emergency Dialysis)      UNIVERSAL PROTOCOL   Site Marked: Yes  Prior Images Obtained and Reviewed:  Yes  Required items: Required blood products, implants, devices and special equipment available    Patient identity confirmed:  Provided demographic data, hospital-assigned identification number and arm band  Patient was reevaluated immediately before administering moderate or deep sedation or anesthesia  Confirmation Checklist:  Patient's identity using two indicators, relevant allergies, procedure was appropriate and matched the consent or emergent situation and correct equipment/implants were available  Time out: Immediately prior to the procedure a time out was called    Universal Protocol: the Joint Commission Universal Protocol was followed    Preparation: Patient was prepped and draped in usual sterile fashion       ANESTHESIA    Anesthesia:  Local infiltration  Local Anesthetic:  Lidocaine 1% with epinephrine      SEDATION  Patient Sedated: Yes    Sedation Type:  Anxiolysis  Sedation:  See MAR for details  Vital signs: Vital signs monitored during sedation      Preparation: skin prepped with 2% chlorhexidine  Skin prep agent dried: skin prep agent completely dried prior to procedure  Sterile barriers: all five maximum sterile barriers used - cap, mask, sterile gown, sterile gloves, and large sterile sheet  Hand hygiene: hand hygiene performed prior to central venous catheter insertion  Patient position: Trendelenburg  Catheter type: double lumen  Pre-procedure: landmarks identified  Ultrasound guidance: yes  Sterile ultrasound techniques: sterile gel and sterile probe covers were used  Number of attempts: 1  Post-procedure: line sutured and dressing applied  Assessment: blood return through all  ports, free fluid flow, placement verified by x-ray and no pneumothorax on x-ray      PROCEDURE    Patient Tolerance:  Patient tolerated the procedure well with no immediate complications  Length of time physician/provider present for 1:1 monitoring during sedation: 25

## 2023-08-11 NOTE — MEDICATION SCRIBE - ADMISSION MEDICATION HISTORY
Medication Scribe Admission Medication History    Admission medication history is complete. The information provided in this note is only as accurate as the sources available at the time of the update.    Medication reconciliation/reorder completed by provider prior to medication history? No    Information Source(s): Patient via in-person    Pertinent Information: None    Changes made to PTA medication list:  Added: None  Deleted: Acetaminophen 325 mg, Ammonium lactate 12% (BID), Metformin 500 mg (2 tab), Neuriva PO, Oxycodone 8 mg, Pseudoephedrine 60 mg, Sorafenib 200 mg  Changed: Ammonium lactate 12% (PRN), Metformin 500 mg (1 tab)    Medication Affordability:  Not including over the counter (OTC) medications, was there a time in the past 3 months when you did not take your medications as prescribed because of cost?: No    Allergies reviewed with patient and updates made in EHR: yes    Medication History Completed By: Lizzy Chaudhari 8/10/2023 8:01 PM    Prior to Admission medications    Medication Sig Last Dose Taking? Auth Provider Long Term End Date   Alcohol Swabs (ALCOHOL PREP) 70 % PADS Use for glucose testing 4x daily  and insulin administration 4x daily.  Yes Frannie Vasquez PA-C     ammonium lactate (AMLACTIN) 12 % external cream Apply topically daily as needed for dry skin (on feet) More than a month at unknown Yes Reported, Patient     aspirin (SM ASPIRIN ADULT LOW STRENGTH) 81 MG EC tablet Take 2 tablets (162 mg) by mouth daily 8/9/2023 at pm Yes Layton Romero MD No    BD VIKTORIA U/F 32G X 4 MM insulin pen needle Use 5 per day  Yes Frannie Vasquez PA-C     benzoyl peroxide 5 % external liquid Use topically in showers as a body wash 8/10/2023 at am Yes Omar Lyon MD     Continuous Blood Gluc Sensor (FREESTYLE CLAUDIA 2 SENSOR) [facility] 1 each See Admin Instructions Change every 14 days. 8/3/2023 at unknown Yes Frannie Vasquez PA-C Yes    empagliflozin (JARDIANCE) 10 MG TABS tablet Take 1 tablet  (10 mg) by mouth daily 8/10/2023 at am Yes Frannie Vasquez PA-C No    insulin aspart (NOVOLOG PEN) 100 UNIT/ML pen Inject 5-10 Units Subcutaneous 4 times daily (with meals and nightly) 1unit : 8 g carb before meals.  Also add 1 unit : 50 mg/dl >180 before meals and at bedtime.  Yes Frannie Vasquez PA-C Yes    insulin degludec (TRESIBA FLEXTOUCH) 100 UNIT/ML pen Inject 34 units subcutaneous once daily 8/10/2023 at unknown Yes Frannie Vasquez PA-C No    ketoconazole (NIZORAL) 2 % external shampoo Apply thin layer topically to scalp in shower (leave on 5 min prior to rinse); may also use as a body wash 8/10/2023 at unknown Yes Omar Lyon MD     lamiVUDine (EPIVIR) 100 MG tablet Take 1 tablet (100 mg) by mouth daily 8/10/2023 at unknown Yes Santi Rosas MD     lisinopril (ZESTRIL) 10 MG tablet Take 1 tablet (10 mg) by mouth daily 8/10/2023 at unknown Yes Eloy Rao MD Yes    metFORMIN (GLUCOPHAGE XR) 500 MG 24 hr tablet Take 500 mg by mouth daily 8/10/2023 at unknown Yes Reported, Patient No    metoprolol succinate ER (TOPROL XL) 25 MG 24 hr tablet Take 0.5 tablets (12.5 mg) by mouth daily 8/9/2023 at pm Yes Manjeet Ireland MD Yes    Multiple Vitamin (TAB-A-GLADIS) TABS TAKE ONE TABLET BY MOUTH ONCE DAILY 8/10/2023 at unknown Yes Lamin Ortiz MD     mycophenolate (GENERIC EQUIVALENT) 250 MG capsule Take 2 capsules (500 mg) by mouth every 12 hours 8/10/2023 at unnown Yes Santi Rosas MD Yes    ondansetron (ZOFRAN ODT) 8 MG ODT tab Take 1 tablet (8 mg) by mouth every 8 hours as needed for nausea 8/10/2023 at 0730 Yes Lamin Ortiz MD No    pantoprazole (PROTONIX) 40 MG EC tablet Take 1 tablet (40 mg) by mouth daily before breakfast 8/10/2023 at am Yes Santi Rosas MD     predniSONE (DELTASONE) 5 MG tablet Take 1 tablet (5 mg) by mouth daily 8/10/2023 at am Yes Santi Rosas MD No    rosuvastatin (CRESTOR) 20 MG tablet Take 1 tablet (20  mg) by mouth daily 8/9/2023 at pm Yes Manjeet Ireland MD Yes    tamsulosin (FLOMAX) 0.4 MG capsule Take 1 capsule (0.4 mg) by mouth daily 8/10/2023 at am Yes Lamin Ortiz MD No    Vitamin D3 50 mcg (2000 units) tablet Take 1 tablet (50 mcg) by mouth daily 8/10/2023 at am Yes Bentley Brunner MD

## 2023-08-11 NOTE — PLAN OF CARE
Goal Outcome Evaluation:      Plan of Care Reviewed With: patient    Overall Patient Progress: improvingOverall Patient Progress: improving    Outcome Evaluation: Wt loss in setting of chemotherapy. Pt reports N/V symptoms are improved. Tolerated breakfast this AM. PRN supplements ordered. See Nutrition Progress note for further details.

## 2023-08-11 NOTE — PLAN OF CARE
ICU End of Shift Summary. See flowsheets for vital signs and detailed assessment.    Changes this shift: Pt tolerated HD run well. Few soft BP's post run while asleep. PRN tylenol given for tenderness at HD line site and pain in BLE. Pain in extremities is reported to be intermittent, sharp, 5-8/10. Infrequent productive cough. Sodium bicarb gtt discontinued. Placed on renal diet. Voiding spontaneously w/ >1L UO. Last K+ result 3.9 at 0200, waiting on AM labs.     Plan:  Transplant hepatology to see pt this AM. Continue to trend labs. Update team with any changes.         Goal Outcome Evaluation:      Plan of Care Reviewed With: patient    Overall Patient Progress: improvingOverall Patient Progress: improving    Outcome Evaluation: Tolerated HD run and electrolytes normalizing.

## 2023-08-11 NOTE — CONSULTS
Hepatology Consultation    Frandy Workman   MRN# [id number]     Age: 58 year old YOB: 1965       Referring provider: Quirino Santana  Attending Hepatologist: Dr. 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 He underwent urgent HD on 8/10.       Recommendations:   # DDLT 11/11/19 MASH/HCC  # Immunosuppression  # HbcAb+ donor  # HCC Peritoneal metastasis   # JERONIMO/CKD  - on lamivudine for HbcAb+ donor graft, with JERONIMO/HD, dose decreased to 25 mg daily  - Liver tests wnl.   - Continue prednisone 5 mg daily and  mg BID. Poor candidate for sirolimus due to elevated triglycerides/CAD.  - if sorafenib restarted, recommend half current dose.     Plan of care discussed with Dr. Lu The above note reflects our joint plan.     Thank you for the opportunity to be involved in Frandy Workman care. Please call with any questions or concerns.     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 He was started on HD 8/10.    He reports feeling  well initially after starting sorafanib until 2 days prior to admission when he had several episodes of vomiting and nausea. He denies melena, hematochezia. He denies significant abdominal pain, change in mentation or chest pain. He now feels back to his baseline.             Past Medical History:     Past Medical History:   Diagnosis Date    Anemia 2013    Arthritis     BPH (benign prostatic hyperplasia)     CAD (coronary artery disease) 04/01/2019    Cholelithiasis     Conductive hearing loss 08/16/2017    Depressive disorder 1986    Suffer effects throughout life    Gastroesophageal reflux disease 12/01/2014    HCC (hepatocellular carcinoma) (H) 01/22/2019    History of diabetic retinopathy 07/2018    HTN (hypertension) 11/20/2019    Hyperlipidemia     Liver cirrhosis secondary to ESTRADA (H)     Liver transplanted (H) 11/11/2019    Portal vein thrombosis 04/11/2019    Type II diabetes mellitus (H)               Past Surgical History:     Past Surgical History:   Procedure Laterality Date    BYPASS GRAFT ARTERY CORONARY N/A 7/14/2021    Procedure: median sternotomy, on cardiopulmonary bypass, CORONARY ARTERY BYPASS GRAFT (CABG) x2 with left greater saphenous vein endoscopic harvest and left internal mammery artery harvest;  Surgeon: Tom Zapata MD;  [facility]    COLONOSCOPY      2015    COLONOSCOPY N/A 12/6/2019    Procedure: COLONOSCOPY, WITH POLYPECTOMY AND BIOPSY;  Surgeon: Adam Morton MD;  Location:  GI    CV CENTRAL VENOUS CATHETER PLACEMENT N/A 7/12/2021    Procedure: Central Venous Catheter Placement;  Surgeon: Fermin Polanco MD;  Location: [facility] CARDIAC CATH LAB    CV CORONARY ANGIOGRAM N/A 7/12/2021    Procedure: Coronary Angiogram;  Surgeon: Fermin Polanco MD;  Location: [facility] CARDIAC CATH LAB    CV HEART CATHETERIZATION WITH POSSIBLE INTERVENTION N/A 2/26/2019    Procedure: CORS;  Surgeon: Jagdish Hoyt MD;  Location: [facility]  CARDIAC CATH LAB    CV INTRA AORTIC BALLOON N/A 7/12/2021    Procedure: Intra Aortic Balloon Pump Insertion;  Surgeon: Fermin Polanco MD;  Location:  HEART CARDIAC CATH LAB    ESOPHAGOSCOPY, GASTROSCOPY, DUODENOSCOPY (EGD), COMBINED N/A 11/17/2016    Procedure: COMBINED ESOPHAGOSCOPY, GASTROSCOPY, DUODENOSCOPY (EGD);  Surgeon: Santi Rosas MD;  Location:  GI    ESOPHAGOSCOPY, GASTROSCOPY, DUODENOSCOPY (EGD), COMBINED N/A 11/17/2017    Procedure: COMBINED ESOPHAGOSCOPY, GASTROSCOPY, DUODENOSCOPY (EGD);  EGD;  Surgeon: Santi Rosas MD;  Location:  GI    ESOPHAGOSCOPY, GASTROSCOPY, DUODENOSCOPY (EGD), COMBINED N/A 12/28/2018    Procedure: EGD;  Surgeon: Santi Rosas MD;  Location:  OR    ESOPHAGOSCOPY, GASTROSCOPY, DUODENOSCOPY (EGD), COMBINED N/A 12/6/2019    Procedure: ESOPHAGOGASTRODUODENOSCOPY, WITH BIOPSY;  Surgeon: Adam Morton MD;  Location:  GI    ESOPHAGOSCOPY, GASTROSCOPY, DUODENOSCOPY (EGD), COMBINED N/A 2/13/2020    Procedure: ESOPHAGOGASTRODUODENOSCOPY (EGD);  Surgeon: Santi Rosas MD;  Location:  GI    EXCISE MASS ABDOMEN N/A 7/13/2023    Procedure: Laparoscopic lysis of adhesions, laparoscopic resection of abdominal mass;  Surgeon: Ruiz Chu MD;  Location:  OR    HEAD & NECK SURGERY      12/2017 at [facility].     IMPLANT GOLD WEIGHT EYELID Right 11/16/2017    Procedure: IMPLANT WEIGHT EYELID;  Right Upper Eyelid Weight, right tarsal strip lower eyelid;  Surgeon: Milana Malave MD;  [facility]    IR CHEMO EMBOLIZATION  1/22/2019    KNEE SURGERY Left     ORTHOPEDIC SURGERY      PAROTIDECTOMY, RADICAL NECK DISSECTION Right 11/2/2017    Procedure: PAROTIDECTOMY, RADICAL NECK DISSECTION;  Right Superfacial Parotidectomy , Facial nerve repair. with [facility] facial nerve monitor.;  Surgeon: Asiya Morgan MD;  [facility]    PHACOEMULSIFICATION CLEAR CORNEA WITH STANDARD INTRAOCULAR LENS IMPLANT Right 1/24/2023     Procedure: PHACOEMULSIFICATION, COMPLEX CATARACT, WITH INTRAOCULAR LENS IMPLANT WITH TRYPAN RIGHT EYE;  Surgeon: Enriqueta Martin MD;  Location: [facility] OR    PHACOEMULSIFICATION WITH STANDARD INTRAOCULAR LENS IMPLANT Left 1/3/2023    Procedure: PHACOEMULSIFICATION, COMPLEX CATARACT, WITH STANDARD INTRAOCULAR LENS IMPLANT INSERTION LEFT EYE;  Surgeon: Enriqueta Martin MD;  Location: [facility] OR    PICC INSERTION Left 2017    4fr SL BioFlo PICC, 44cm in the L basilic vein w/ tip in the low SVC    RETURN LIVER TRANSPLANT N/A 2019    Procedure: Exploratory laparotomy, hematoma evacuation, abdominal washout;  Surgeon: Александр Ramos MD;  [facility]    TRANSPLANT LIVER RECIPIENT  DONOR N/A 2019    Procedure: TRANSPLANT, LIVER, RECIPIENT,  DONOR;  Surgeon: Александр Ramos MD;  [facility]    VASCULAR SURGERY                Social History:     Social History     Tobacco Use    Smoking status: Former     Packs/day: 6.00     Years: 30.00     Pack years: 180.00     Types: Cigars, Cigarettes     Start date: 2016     Quit date: 10/25/2017     Years since quittin.7     Passive exposure: Past    Smokeless tobacco: Former     Types: Chew     Quit date: 10/31/2017    Tobacco comments:     1 tin per week   Substance Use Topics    Alcohol use: No     Alcohol/week: 0.0 standard drinks of alcohol     Comment: quit 1996             Family History:   The   I have reviewed this patient's family history and updated it with pertinent information if needed.  Family History   Problem Relation Age of Onset    Skin Cancer Mother     Cancer Mother         mastectomy    Diabetes Mother     Cerebrovascular Disease Mother     Thyroid Disease Mother     Depression Mother     Colon Cancer Father 60    Pancreatic Cancer Father 60    Prostate Cancer Father     Macular Degeneration Father     Glaucoma Father     Skin Cancer Father     Thyroid Disease Sister     Depression Sister      Asthma Sister     Sleep Apnea Brother     Colorectal Cancer Maternal Grandmother     Cancer Maternal Grandmother     Substance Abuse Maternal Grandmother         Alcohol    Prostate Cancer Maternal Grandfather     Substance Abuse Maternal Grandfather         Alcohol    Colorectal Cancer Paternal Grandmother     Liver Disease No family hx of     Melanoma No family hx of     Anesthesia Reaction No family hx of     Deep Vein Thrombosis (DVT) No family hx of                   Immunizations:     Immunization History   Administered Date(s) Administered    COVID-19 Bivalent 12+ (Pfizer) 12/07/2022, 05/17/2023    COVID-19 MONOVALENT 12+ (Pfizer) 03/15/2021, 04/05/2021, 12/07/2021    Flu 65+ Years 08/31/2017    Flu, Unspecified 10/13/1994, 09/30/2015, 09/01/2016, 09/28/2019    HEPA 12/29/2015, 08/16/2016    HepB 12/29/2015, 03/18/2016, 08/16/2016    Influenza Vaccine 50-64 or 18-64 w/egg allergy (Flublok) 10/12/2021, 12/07/2022    Influenza Vaccine >6 months (Alfuria,Fluzone) 09/08/2018, 09/23/2020    Influenza Vaccine, 6+MO IM (QUADRIVALENT W/PRESERVATIVES) 09/01/2017, 09/28/2019    Mantoux Tuberculin Skin Test 12/11/2019    Pneumo Conj 13-V (2010&after) 10/01/2019    Pneumococcal 20 valent Conjugate (Prevnar 20) 12/07/2022    Pneumococcal 23 valent 09/30/2015    TDAP Vaccine (Boostrix) 12/29/2015    Zoster recombinant adjuvanted (SHINGRIX) 07/11/2020            Allergies:     Allergies   Allergen Reactions    Codeine Other (See Comments)     Cannot take due to liver  Cannot tolerate oral narcotics    Seasonal Allergies      Sneezing, coughing, runny and itchy eyes             Medications:   @  Facility-Administered Medications Prior to Admission   Medication Dose Route Frequency Provider Last Rate Last Admin    aflibercept (EYLEA) injection prefilled syringe 2 mg  2 mg Intravitreal Q28 Days Enriqueta Martin MD   2 mg at 05/31/23 1144    aflibercept (EYLEA) injection prefilled syringe 2 mg  2 mg Intravitreal Q28  Days Juan Angeles MD   2 mg at 08/02/23 1023    dexamethasone (OZURDEX) intravitreal implant 0.7 mg  0.7 mg Intravitreal Q2 Months Enriqueta Martin MD        dexamethasone (OZURDEX) intravitreal implant 0.7 mg  0.7 mg Intravitreal Q2 Months Enriqueta Martin MD faricimab-svoa (VABYSMO) intravitreal injection 6 mg  6 mg Intravitreal q28 days Enriqueta Martin MD faricimab-svoa (VABYSMO) intravitreal injection 6 mg  6 mg Intravitreal q28 days Enriqueta Martin MD        lidocaine (PF) (XYLOCAINE) injection 1 mL  1 mL Ophthalmic Q2 Months Enriqueta Martin MD         Medications Prior to Admission   Medication Sig Dispense Refill Last Dose    Alcohol Swabs (ALCOHOL PREP) 70 % PADS Use for glucose testing 4x daily  and insulin administration 4x daily. 400 each 1     ammonium lactate (AMLACTIN) 12 % external cream Apply topically daily as needed for dry skin (on feet)   More than a month at unknown    aspirin (SM ASPIRIN ADULT LOW STRENGTH) 81 MG EC tablet Take 2 tablets (162 mg) by mouth daily 180 tablet 3 8/9/2023 at pm    BD VIKTORIA U/F 32G X 4 MM insulin pen needle Use 5 per day 300 each 3     benzoyl peroxide 5 % external liquid Use topically in showers as a body wash 226 g 11 8/10/2023 at am    Continuous Blood Gluc Sensor (FREESTYLE CLAUDIA 2 SENSOR) MISC 1 each See Admin Instructions Change every 14 days. 7 each 3 8/3/2023 at unknown    empagliflozin (JARDIANCE) 10 MG TABS tablet Take 1 tablet (10 mg) by mouth daily 90 tablet 3 8/10/2023 at am    insulin aspart (NOVOLOG PEN) 100 UNIT/ML pen Inject 5-10 Units Subcutaneous 4 times daily (with meals and nightly) 1unit : 8 g carb before meals.  Also add 1 unit : 50 mg/dl >180 before meals and at bedtime. 45 mL 3     insulin degludec (TRESIBA FLEXTOUCH) 100 UNIT/ML pen Inject 34 units subcutaneous once daily 45 mL 3 8/10/2023 at unknown    ketoconazole (NIZORAL) 2 % external shampoo Apply thin layer topically to scalp in  "shower (leave on 5 min prior to rinse); may also use as a body wash 120 mL 11 8/10/2023 at unknown    lamiVUDine (EPIVIR) 100 MG tablet Take 1 tablet (100 mg) by mouth daily 90 tablet 3 8/10/2023 at unknown    lisinopril (ZESTRIL) 10 MG tablet Take 1 tablet (10 mg) by mouth daily 90 tablet 3 8/10/2023 at unknown    metFORMIN (GLUCOPHAGE XR) 500 MG 24 hr tablet Take 500 mg by mouth daily   8/10/2023 at unknown    metoprolol succinate ER (TOPROL XL) 25 MG 24 hr tablet Take 0.5 tablets (12.5 mg) by mouth daily 45 tablet 1 8/9/2023 at pm    Multiple Vitamin (TAB-A-GLADIS) TABS TAKE ONE TABLET BY MOUTH ONCE DAILY 90 tablet 0 8/10/2023 at unknown    mycophenolate (GENERIC EQUIVALENT) 250 MG capsule Take 2 capsules (500 mg) by mouth every 12 hours 120 capsule 11 8/10/2023 at unnown    ondansetron (ZOFRAN ODT) 8 MG ODT tab Take 1 tablet (8 mg) by mouth every 8 hours as needed for nausea 15 tablet 1 8/10/2023 at 0730    pantoprazole (PROTONIX) 40 MG EC tablet Take 1 tablet (40 mg) by mouth daily before breakfast 90 tablet 3 8/10/2023 at am    predniSONE (DELTASONE) 5 MG tablet Take 1 tablet (5 mg) by mouth daily 90 tablet 3 8/10/2023 at am    rosuvastatin (CRESTOR) 20 MG tablet Take 1 tablet (20 mg) by mouth daily 90 tablet 3 8/9/2023 at pm    tamsulosin (FLOMAX) 0.4 MG capsule Take 1 capsule (0.4 mg) by mouth daily 90 capsule 3 8/10/2023 at am    Vitamin D3 50 mcg (2000 units) tablet Take 1 tablet (50 mcg) by mouth daily 90 tablet 3 8/10/2023 at am   @          Review of Systems:    ROS: 10 point ROS neg other than the symptoms noted above in the HPI.          Physical Exam:   Blood pressure (!) 83/55, pulse 107, temperature 99.5  F (37.5  C), temperature source Oral, resp. rate 17, height 1.753 m (5' 9\"), weight 85.1 kg (187 lb 9.8 oz), SpO2 100 %. Body mass index is 27.71 kg/m .  Date 08/11/23 0700 - 08/12/23 0659   Shift 5245-8707 0534-7806 9978-5165 24 Hour Total   INTAKE   I.V. 5   5   Shift Total(mL/kg) 5(0.06)   " 5(0.06)   OUTPUT   Shift Total(mL/kg)       Weight (kg) 85.1 85.1 85.1 85.1     General: In no acute distress, no facial muscle wasting  Neuro: AOx3, No asterixis  HEENT: PERRL Noscleral icterus, Nooral lesions  Lymph:  Nocervical lymphadenoapthy  CV:  Skin warm and dry  Lungs:  Respirations even and nonlabored on room air  Abd: Nontender, nondistended  Extrem:  +1 lower  peripehral edema  Skin: Nojaundice  Psych: pleasant         Data:     Lab Results   Component Value Date    WBC 4.8 08/11/2023    WBC 4.4 06/28/2021     Lab Results   Component Value Date    RBC 3.22 08/11/2023    RBC 2.35 06/28/2021     Lab Results   Component Value Date    HGB 10.0 08/11/2023    HGB 7.3 06/28/2021     Lab Results   Component Value Date    HCT 30.8 08/11/2023    HCT 22.6 06/28/2021     No components found for: MCT  Lab Results   Component Value Date    MCV 96 08/11/2023    MCV 96 06/28/2021     Lab Results   Component Value Date    MCH 31.1 08/11/2023    MCH 31.1 06/28/2021     Lab Results   Component Value Date    MCHC 32.5 08/11/2023    MCHC 32.3 06/28/2021     Lab Results   Component Value Date    RDW 13.4 08/11/2023    RDW 15.1 06/28/2021     Lab Results   Component Value Date     08/11/2023     06/28/2021       Last Basic Metabolic Panel:  Lab Results   Component Value Date     08/11/2023     06/28/2021      Lab Results   Component Value Date    POTASSIUM 4.1 08/11/2023    POTASSIUM 4.1 08/11/2023    POTASSIUM 7.7 08/10/2023    POTASSIUM 4.7 07/20/2022    POTASSIUM 4.6 06/28/2021     Lab Results   Component Value Date    CHLORIDE 109 08/11/2023    CHLORIDE 105 07/20/2022    CHLORIDE 107 06/28/2021     Lab Results   Component Value Date    DEBORAH 8.0 08/11/2023    DEBORAH 9.1 06/28/2021     Lab Results   Component Value Date    CO2 16 08/11/2023    CO2 26 07/20/2022    CO2 25 06/28/2021     Lab Results   Component Value Date    BUN 62.0 08/11/2023    BUN 32 07/20/2022    BUN 33 06/28/2021     Lab Results    Component Value Date    CR 4.29 08/11/2023    CR 1.62 06/28/2021     Lab Results   Component Value Date     08/11/2023     08/11/2023     07/20/2022     06/28/2021       Liver Function Studies -   Recent Labs   Lab Test 08/10/23  2151   PROTTOTAL 6.0*   ALBUMIN 3.4*   BILITOTAL 0.2   ALKPHOS 84   AST 25   ALT 16       Lab Results   Component Value Date    INR 1.16 08/10/2023         Previous work-up:   Lab Results   Component Value Date    HEPBANG Nonreactive 11/15/2019    HBCAB Nonreactive 11/10/2019    AUSAB 431.07 (H) 11/10/2019    HCVAB Nonreactive 11/10/2019    QUAN 508 (H) 04/25/2022    IRONSAT 42 04/25/2022    TSH 1.24 04/25/2022    CHOL 178 07/28/2023    HDL 34 (L) 07/28/2023    LDL  07/28/2023      Comment:      Cannot estimate LDL when triglyceride exceeds 400 mg/dL    TRIG 405 (H) 07/28/2023    A1C 6.3 (H) 06/14/2023      No results found for: SPECDES, LDRESULTS         Previous Endoscopy:     EGD 2/13/20  Impression:          - Non-bleeding small (< 5 mm) esophageal varices.                        - Esophageal plaques were found, suspicious for                        candidiasis. Brushings performed.                        - Normal stomach.                        - Normal examined duodenum.   IMAGING:  Results for orders placed during the hospital encounter of 01/10/22    US Liver Transplant    Narrative  EXAMINATION: US LIVER TRANSPLANT, 1/12/2022 9:10 AM    COMPARISON: CT 11/26/2021, MR 9/25/2020, liver ultrasound 11/14/2019    HISTORY: RUQ abdominal pain with Hx of Liver tx.  JERONIMO, rule out  hydronephrosis    TECHNIQUE: The abdomen was scanned in standard fashion with  specialized ultrasound transducer(s) using both grey scale and limited  color Doppler techniques.    Findings:    Liver: Hepatic parenchyma is of normal echogenicity without evidence  for focal mass. Main portal vein is patent with antegrade flow  measuring cm in diameter.    Gallbladder: Gallbladder is  surgically absent. Both the intra and  extrahepatic biliary system are of normal caliber with the common duct  measuring 4 mm in diameter.    Pancreas: Not well-visualized.    Kidneys: Both kidneys are of normal echotexture, without mass nor  hydronephrosis. The craniocaudal dimensions are: right- 10.8 cm, left-  11.7 cm.    Urinary bladder: Unremarkable.    Spleen: The spleen is unremarkable and measures 14 cm in sagittal  dimension.    Fluid: No free fluid.    DOPPLER:  Doppler evaluation shows flow towards the liver in the  portal venous system.    Flow is  55 cm/sec in the extrahepatic native portal vein  59 cm/sec at the portal vein anastomosis  33 cm/sec in the intrahepatic transplant portal vein.    Flow is:  17 cm/sec in the leftward of the branch portal vein  26 cm/sec in the rightward branch of the portal vein  both flow towards the liver.    Flow in the hepatic artery is towards the liver and  47 cm/sec peak systolic  20 cm/sec minimum diastolic  0.58 resistive index.    The splenic vein is patent and flow is towards the liver. The left,  middle, and right hepatic veins are patent with flow towards the IVC.  The IVC is patent with flow towards the heart.  Aorta is not dilated.    Impression  Impression:  1. Normal grayscale and Doppler evaluation of the liver transplant.  2. No hydronephrosis.  3. Mild hepatomegaly.    I have personally reviewed the examination and initial interpretation  and I agree with the findings.    JUAN DAVID LOCKHART MD      SYSTEM ID:  [id number]    Results for orders placed during the hospital encounter of 08/10/23    XR Chest Port 1 View (Preliminary)  This result has not been signed. Information might be incomplete.    Impression  RESIDENT PRELIMINARY INTERPRETATION  IMPRESSION:  1.  Right IJ central venous catheter terminates over the SVC.  2.  No focal consolidation.        XR Chest Port 1 View    Result Date: 8/10/2023  RESIDENT PRELIMINARY INTERPRETATION IMPRESSION: 1.  Right  IJ central venous catheter terminates over the SVC. 2.  No focal consolidation.    XR Chest Port 1 View    Result Date: 8/10/2023  EXAM: XR CHEST PORT 1 VIEW LOCATION: [facility] DATE: 8/10/2023 INDICATION: Weakness. COMPARISON: 05/17/2023.     IMPRESSION: No focal airspace consolidation. No pleural effusion or pneumothorax. Mild cardiomegaly. Median sternotomy and coronary artery bypass grafting. Atherosclerosis of the thoracic aorta.    CT Abdomen Pelvis w/o Contrast    Result Date: 8/10/2023  EXAM: CT ABDOMEN PELVIS W/O CONTRAST  8/10/2023 6:14 PM HISTORY:  abdominal pain   COMPARISON:  X-ray 7/28/2023 TECHNIQUE: CT abdomen and pelvis noncontrast; axial slices with sagittal and coronal reconstructions. Total DLP: 410 mGy*cm. FINDINGS: LUNG BASES: Mild basilar scattered streaky atelectasis. Otherwise clear lung bases. Lower mediastinum: Intact-appearing lower median sternotomy wires. ABDOMEN: Liver: Post surgical changes of the liver transplantation. Gallbladder: Surgically absent. Pancreas: Spleen: Within normal limits. Adrenal glands: No focal nodule/mass. Kidneys: No suspicious renal lesion/mass. No hydronephrosis. No renal or urinary collecting system stones. The bladder is partially decompressed but grossly unremarkable. Mild nonspecific unchanged perinephric stranding. Bowel: Normal caliber of the large and small bowel with normal appendix. Stable small fat-containing lower thoracic/epigastric ventral hernia (series 5 image 32). Unchanged small fat-containing umbilical hernia. PELVIS: No significant pelvic free fluid. Pelvic phleboliths. VASCULATURE AND LYMPH NODES: Mild/moderate aortoiliac atherosclerosis calcification. No intraperitoneal or retroperitoneal lymphadenopathy. BONES AND SOFT TISSUES: Degenerative changes of the visualized spine, including multilevel thoracic anterior/anterolateral osteophyte formation. No aggressive or suspicious osseous/soft  tissue lesion identified.     IMPRESSION: 1. No acute process within the lung bases, abdomen, or pelvis. 2. Stable postsurgical changes of liver transplantation. I have personally reviewed the examination and initial interpretation and I agree with the findings. JUAN DAVID LOCKHART MD   SYSTEM ID:  [id number]

## 2023-08-11 NOTE — PROGRESS NOTES
Nephrology Progress Note  08/11/2023        Miller Workman is a 58 year old man with a PMH of DMII, bipolar disorder, cirrhosis s/t ESTRADA with subsequent development of HCC, s/p liver transplant , HLD, HTN, GERD, BPH and CAD with hx of 2 vessel CABG in July 2021, CKD with anemia of chronic disease, on erythropoetin.  Recently he was diagnosed with metastatic lesions in the peritoneum for this he was started on sorafenib on 7/29/2023. He presented with more than a week of not feeling well. After starting sorafenib he subsequently had diarrhea for several days. In the past 3-4 days he has mostly had severe nausea and vomiting. At presentation to the ED 8/10 he was noted to have JERONIMO with severe acidosis and hyperklamemia    Interval History :   Over night he underwent a dialysis run via a R temp internal jugular catheter. This am his potassium is normal at 4.1. However, bicarbonate remains at 16. Anion gap is 17. Phosphorus is 1.6.  UOP has been excellent and he has made 2L of urine today. Creatinine at admit was elevated to 7.38. This am post dialysis he is 4.29. He feels well today. No nausea. No vomiting. No issues on dialysis. No diarrhea. No difficulties with urination. No hesitency, no dribbling. Ketones were positive.      Assessment & Recommendations:     Recommendations were communicated to primary team via this note    JERONIMO: Creatinine cannot be interpreted due to the dialysis but given his uop he is demonstrating renal improvement. No indications for dialysis today but I would not pull his line until we can confirm that his kidney function is indeed improving. If creatinine remains stable or is improved tomorrow it can come out    2. Acidosis: Intitially he had an anion gap of 18. His delta/delta was less than one and this is consistent with a ketoacidosis. While the diarrhea may have made him volume depleted, the nausea and vomiting and lack of intake resulted in a more classic DKA/starvation ketosis picture  "with acidosis, hyperkalemia, volume depletion. Now corrected and he is eating and drinking. Keep sugars below 200.       Time spent: 35 minutes on this date of encounter for chart review, physical exam, medical decision making and co-ordination of care.    Recommendations were communicated to primary team via this note    Charlene Black MD MD, MS, FNKF        Review of Systems:   All systems were reviewed. Pertinent positives and negatives are noted above      Physical Exam:   I/O last 3 completed shifts:  In: 1413.33 [I.V.:1413.33]  Out: 1355 [Urine:1355]   BP (!) 134/96 (BP Location: Right arm, Cuff Size: Adult Regular)   Pulse 112   Temp 98.3  F (36.8  C) (Oral)   Resp 20   Ht 1.753 m (5' 9\")   Wt 85.1 kg (187 lb 9.8 oz)   SpO2 100%   BMI 27.71 kg/m       Gen: NAD alert and oriented X 3  Lungs: Clear at bases. No dullness to percussion  Card; regular but tachycardic.  Abd: good bowel sounds. Non tender  Ext: no LE edema  Access; R internal jugular catheter with no oozing   Neuro: no asterixis no tremor no myoclonus  Skin: no petechia no other rashes      Labs:   All labs reviewed by me  Electrolytes/Renal -   Recent Labs   Lab Test 08/11/23  1204 08/11/23  0852 08/11/23  0809 08/11/23  0640 08/11/23  0500 08/11/23  0209 08/10/23  2159 08/10/23  2151 08/10/23  2018 08/10/23  1844 08/10/23  1833 08/10/23  1024 07/28/23  1046 06/14/23  0924 03/30/23  0905   NA  --   --   --  142  --   --   --  141  --  139 138 139 130*   < > 139   POTASSIUM  --  4.4  --  4.1  4.1  --  3.9  --  5.9*  --  7.7* 8.0* 6.3* 5.5*   < > 4.5   CHLORIDE  --   --   --  109*  --   --   --  116*  --   --  112* 112* 103   < > 103   CO2  --   --   --  16*  --   --   --  9*  --   --  8* 9* 17*   < > 26   BUN  --   --   --  62.0*  --   --   --  105.0*  --   --  117.0* 107.0* 56.8*   < > 29.1*   CR  --   --   --  4.29*  --   --   --  6.77*  --   --  7.38* 6.50* 2.46*   < > 1.80*   *  --  103* 119*   < >  --    < > 113*   < > 122* " 130* 180* 60*   < > 204*   DEBORAH  --   --   --  8.0*  --   --   --  7.9*  --   --  8.6 9.1 9.1   < > 9.2   MAG  --   --   --  1.6*  --   --   --  2.1  --   --  2.3 2.3 2.2  --   --    PHOS  --   --   --   --   --   --   --   --   --   --   --  6.0* 3.4  --  3.4    < > = values in this interval not displayed.       CBC -   Recent Labs   Lab Test 08/11/23  0640 08/10/23  2151 08/10/23  1844 08/10/23  1833   WBC 4.8 5.2  --  8.8   HGB 10.0* 10.3* 11.9* 11.9*   * 120*  --  164       LFTs -   Recent Labs   Lab Test 08/10/23  2151 08/10/23  1833 08/10/23  1024   ALKPHOS 84 108 110   BILITOTAL 0.2 0.3 0.4   ALT 16 26 27   AST 25 22 24   PROTTOTAL 6.0* 7.3 7.6   ALBUMIN 3.4* 4.3 4.4       Iron Panel -   Recent Labs   Lab Test 04/25/22  0925 02/09/22  0850 11/16/21  1004   IRON 119 96 93   IRONSAT 42 39 44   QUAN 508* 1,046* 400*           Current Medications:   aspirin  81 mg Oral Daily    heparin ANTICOAGULANT  5,000 Units Subcutaneous Q8H    insulin aspart  1-7 Units Subcutaneous TID AC    insulin aspart  1-5 Units Subcutaneous At Bedtime    insulin degludec  15 Units Subcutaneous At Bedtime    [Held by provider] lamiVUDine  100 mg Oral Daily    magnesium oxide  400 mg Oral or Feeding Tube Q4H    [Held by provider] metoprolol succinate ER  12.5 mg Oral Daily    mycophenolate  500 mg Oral Q12H    pantoprazole  40 mg Oral QAM AC    predniSONE  5 mg Oral Daily    [Held by provider] rosuvastatin  20 mg Oral Daily    tamsulosin  0.4 mg Oral Daily    vitamin D3  50 mcg Oral Daily       Charlene Black MD

## 2023-08-11 NOTE — PROGRESS NOTES
[facility]    ICU Accept Note - Medicine Service, YURIDIA TEAM 4       Date of Admission:  8/10/2023    Assessment & Plan   Frandy Workman is a 58 year old male admitted on 8/10/2023 who is being transferred out of the ICU on 8/11/2023. He has a history of CAD s/p CABG in 2021, DMII, CKD, and cirrhosis with HCC now s/p liver transplant in 2019 who has since developed new peritoneal metastases and was started on Sorafenib on 7/29/23. He was admitted to the ICU on 8/10/2023 for emergent dialysis for hyperkalemia (8) with EKG changes 2/2 JERONIMO on CKD stage III. K now improved after shifting in the ED and one round of HD.     Active issues:   # Hyperkalemia  Potassium 8 on admission. EKG with peaked T waves, Qtc 422. S/p calcium gluconate, insulin/dextrose, and now one round of HD clearance. Likely 2/2 renal failiure and metabolic acidosis. EKG upon arrival in the ICU with resolution of peaked T waves.  - Nephrology consulted, appreciate recs  - Dialysis line placed  - Monitor on labs     # Hyperchloremic non-anion gap metabolic acidosis; resolved  # Anion gap metabolic acidosis  VBG with pH 7.07, bicarb 9, and anion gap 18 on admission. Delta delta ratio was 0.4. Likely a combination of anion gap and non-anion gap metabolic acidosis. Anion gap acidosis could be due to uremia with renal failure or starvation ketoacidosis as he was not eating well for multiple days PTA. LA was normal. NAGMA likely 2/2 bicarb losses due to recent diarrhea, type 2 RTA less likely given he has hyperkalemia. Elevated beta-hydroxybutyrate  - Bicarb drip off  - Monitor     # JERONIMO on CKD stage III, intrarenal   # Suspect ATN  # Diabetic nephropathy  Baseline Cr at 2, elevated to 7.38 on admission now down to 4.29 after 1L crystalloid administration in the ED HD clearance run overnight. BUN:Cr ratio around 15 with significant UOP despite being clinically dry post HD run indicating likely intrarenal  ATN picture. Prerenal also a potential contributing factor due to significantly reduced PO intake and n/v/d. PTA lisinopril likely not helping the situation as well.  - Renal consulted, further HD as per nephrology  - Strict I/Os    # Hypocalcemia  # Hypomagnesemia  - Replace as needed    # ESTRADA cirrhosis c/b portal vein thrombosis  # s/p Liver transplant 11/2019  # HCC s/p TACE 1/2019, now recurrent w/ mets to peritoneum recently initiated on Sorafenib  # HBcAg +ve donor  # Immunosuppressed  # Hx of Esophageal varices  He had a liver transplant in 2019 for HCC and cirrhosis. In June 2023, found to have new biopsy proven peritoneal metastases of HCC. Started on oral chemotherapy (sorafenib 400mg BID) on 7/29/23, held by oncology due to vomiting and diarrhea. Immunosuppression with prednisone and mycophenolate. LFTs on admission wnl. CT abdomen with stable post-surgical changes of transplant liver.  - Monitor MELD labs  - Resume mycophenolate 500mg BID  - Continue prednisone 5 mg  - Lamivudine 25 mg down from 100mg      # Nausea/Vomiting  # Diarrhea  # Anorexia  # Nutrition  Reports these symptoms resolved multiple days prior to admission after starting Zofran. Had 4 days of N/V/D. Suspect 2/2 to chemotherapy, post nasal drip from viral URI, and/or viral gastroenteritis. Patient has not been maintaining adequate hydration for the past few days. Did get 1L LR in ED.   - Zofran prn  - renal diet    # Viral URI  Patient's caretaker had a URI recently. Productive cough for about 5 days. No fevers or leukocytosis currently. No consolidation on CXR. Viral panel positive for Rhinovirus. Received vanc + zosyn in ED.  - blood cultures from ED NGTD    # CAD s/p 2 vessel CABG to LAD and OM  # HTN  # HLD  S/p CABG 7/2021. On metoprolol 12.5 mg daily, rosuvastatin 20 mg daily, and lisinopril 10 mg. Appears volume down on exam with dry mucus membranes and tachycardia with recent low PO intake.  - hold metoprolol and lisinopril due  due to volume status and renal function. Resume once stable  - MAP goal >65  - consider new BP agent upon discharge     # Elevated troponin  Troponin elevated to 66 on admission, down to 46 on recheck. No ST segment changes on EKG, suspect a type 2 demand ischemia vs decreased renal clearance.  - continue to monitor    # DMII  Last A1c 6.3. Well-controlled with outpatient regimen of basal insulin 34 units, carb count insulin, metformin, and empagliflozin. Did receive insulin bolus w/ dextrose for hyperkalemia. Sugars afterwards in the 80s.  - Hold PTA diabetes meds  - Basal insulin 15 units daily  - MDSS    # Thrombocytopenia, acute  PLT down to 106k from 178k on the day of admission. Likely dilutional. Ordered bilateral upper and lower extremity doppler US to assess for HIT: no evidence of DVT  - Monitor on CBC     # Chronic anemia, macrocytic  Baseline hgb 11, stable here. Mixed picture with macrocytic and normocytic. Could be secondary to anemia of chronic disease vs medication adverse effects vs nutritional deficiencies. Does receive EPO per renal notes.  - Monitor hgb, transfuse <7    # Erythematous macular rash  Erythematous macular rash on the neck and chest. Patient states this developed today. Does not appear to be urticaria. It could be an adverse effect of sorafenib.  - monitor    # Bipolar disorder  Was previously on lithium, but no recent PTA meds.    # Elevated lipase  Lipase elevated to 797. Mild tenderness on exam with known peritoneal metastases and recent laparoscopic abdominal exploration. No apparent fat stranding on abdominal CT per our read, no concerns per discussion with radiology.   - monitor clinically    Plan for next 24 hours:        ICU Transfer Checklist    YES NO Links/Additional comments   Current meds & orders reviewed & updated? [x] [] Transfer Navigator   O2 requirements appropriate for accepting floor? [x] [] O2 Device: None (Room air)       Appropriate antibiotics ordered? [x] []  "Fever Report  30 Day Micro   Appropriate fluids ordered? [x] []    Appropriate diet ordered? [x] [] Renal Diet (dialysis)   Telemetry indicated/ordered?    [x] [] Cardiac Monitoring: ACTIVE order. Indication: ICU     Appropriate DVT prophy ordered? [x] [] Anticoag/VTE   Carroll indicated/ordered?    [x] [] Not present   Central lines indicated? [x] [] LDA Avatar  CVC Double Lumen Right Internal jugular-Site Assessment: WDL except;Tender;Ecchymotic   PT/OT indicated/ordered? [x] [] D/C Planner  [facility]   Code status reviewed? [x] [] Full Code   Preferred contact on file? [x] []      Clinically Significant Risk Factors Present on Admission        # Hyperkalemia: Highest K = 8 mmol/L in last 2 days, will monitor as appropriate   # Hypocalcemia: Lowest Ca = 7.9 mg/dL in last 2 days, will monitor and replace as appropriate   # Hypomagnesemia: Lowest Mg = 1.6 mg/dL in last 2 days, will replace as needed   # Hypoalbuminemia: Lowest albumin = 3.4 g/dL at 8/10/2023  9:51 PM, will monitor as appropriate  # Coagulation Defect: INR = 1.16 (Ref range: 0.85 - 1.15) and/or PTT = N/A, will monitor for bleeding  # Drug Induced Platelet Defect: home medication list includes an antiplatelet medication   # Hypertension: Noted on problem list      # Overweight: Estimated body mass index is 27.71 kg/m  as calculated from the following:    Height as of this encounter: 1.753 m (5' 9\").    Weight as of this encounter: 85.1 kg (187 lb 9.8 oz).            Disposition Plan      Expected Discharge Date: 08/16/2023                The patient's care was discussed with the Attending Physician, Dr. Schrader .    Yusef Soares Jr., MD  Medicine Service, [facility] TEAM [address] [place] [address]  Securely message with [employer] (more info)  Text page via [employer] Paging/Directory   See signed in provider for up to date coverage " information  ______________________________________________________________________    Interval History   Patient doing well. Had dialysis with improvement in labs. Making good urine. Was able to eat this morning.    Physical Exam   Vital Signs: Temp: 98.3  F (36.8  C) Temp src: Oral BP: (!) 134/96 Pulse: 112   Resp: 20 SpO2: 100 % O2 Device: None (Room air)    Weight: 187 lbs 9.78 oz    General: Awake, laying in bed. No apparent distress  HEENT: subconjunctival hemorrhage in right eye, dry MM with aphthous ulcer on the hard palate  Neuro: A&Ox3, arm tremors have improved since previous exam, answers questions appropriately  Pulm/Resp: Nonlabored breathing, good air entry bilaterally. Clear breath sounds bilaterally without rhonchi, crackles or wheeze, breathing non-labored  CV: tachycardic, regular rhythm, no murmurs appreciated on auscultation, radial symmetric  Abdomen: Soft, mildly distended, mild tenderness to palpation in the LUQ. Hypoactive bowel sounds  Incisions/Skin: erythematous macular rash on neck extending midline chest. Slightly improved from previous exam. no rash on palms or soles.  Extremities: No lower extremity edema    Medical Decision Making        Data   Unresulted Labs Ordered in the Past 30 Days of this Admission       Date and Time Order Name Status Description    8/10/2023 10:37 PM Hepatitis B surface antigen In process     8/10/2023 10:37 PM Hepatitis B Surface Antibody In process     8/10/2023  9:41 PM Blood Culture Hand, Left In process     8/10/2023  9:41 PM Blood Culture Hand, Right Preliminary             I have personally reviewed the following data over the past 24 hrs:    4.8  \   10.0 (L)   / 106 (L)     142 109 (H) 62.0 (H) /  128 (H)   4.4 16 (L) 4.29 (H) \     ALT: 16 AST: 25 AP: 84 TBILI: 0.2   ALB: 3.4 (L) TOT PROTEIN: 6.0 (L) LIPASE: 797 (H)     Trop: 46 (H) BNP: N/A     Procal: 0.35 (H) CRP: N/A Lactic Acid: 0.6       INR:  1.16 (H) PTT:  N/A   D-dimer:  N/A Fibrinogen:   "N/A       Imaging results reviewed over the past 24 hrs:   Recent Results (from the past 24 hour(s))   CT Abdomen Pelvis w/o Contrast    Addendum: 8/11/2023    There is a typographical omission in the original dictation. The  section titled pancreas in the body of the report should read as  follows:  \"Unenhanced appearance of the pancreas within normal limits with no  ductal dilatation.\"  The remainder of the exam is as dictated.    JUAN DAVID LOCKHART MD         SYSTEM ID:  [id number]      Narrative    EXAM: CT ABDOMEN PELVIS W/O CONTRAST  8/10/2023 6:14 PM     HISTORY:  abdominal pain       COMPARISON:  X-ray 7/28/2023    TECHNIQUE: CT abdomen and pelvis noncontrast; axial slices with  sagittal and coronal reconstructions. Total DLP: 410 mGy*cm.    FINDINGS:  LUNG BASES:  Mild basilar scattered streaky atelectasis. Otherwise clear lung  bases.    Lower mediastinum: Intact-appearing lower median sternotomy wires.    ABDOMEN:  Liver: Post surgical changes of the liver transplantation.    Gallbladder: Surgically absent.    Pancreas:     Spleen: Within normal limits.    Adrenal glands: No focal nodule/mass.    Kidneys: No suspicious renal lesion/mass. No hydronephrosis. No renal  or urinary collecting system stones. The bladder is partially  decompressed but grossly unremarkable. Mild nonspecific unchanged  perinephric stranding.    Bowel: Normal caliber of the large and small bowel with normal  appendix. Stable small fat-containing lower thoracic/epigastric  ventral hernia (series 5 image 32). Unchanged small fat-containing  umbilical hernia.    PELVIS:  No significant pelvic free fluid. Pelvic phleboliths.    VASCULATURE AND LYMPH NODES:  Mild/moderate aortoiliac atherosclerosis calcification. No  intraperitoneal or retroperitoneal lymphadenopathy.    BONES AND SOFT TISSUES:  Degenerative changes of the visualized spine, including multilevel  thoracic anterior/anterolateral osteophyte formation. No aggressive " or  suspicious osseous/soft tissue lesion identified.       Impression    IMPRESSION:  1. No acute process within the lung bases, abdomen, or pelvis.  2. Stable postsurgical changes of liver transplantation.    I have personally reviewed the examination and initial interpretation  and I agree with the findings.    JUAN DAVID LOCKHART MD         SYSTEM ID:  [id number]   XR Chest Port 1 View    Narrative    EXAM: XR CHEST PORT 1 VIEW  LOCATION: [facility]  DATE: 8/10/2023    INDICATION: Weakness.  COMPARISON: 05/17/2023.      Impression    IMPRESSION: No focal airspace consolidation. No pleural effusion or pneumothorax.    Mild cardiomegaly. Median sternotomy and coronary artery bypass grafting. Atherosclerosis of the thoracic aorta.   XR Chest Port 1 View    Narrative    EXAM:  XR CHEST PORT 1 VIEW    INDICATION: s/p right HD catheter placement    COMPARISON:  Chest x-ray 10/20/2023 at 2002    FINDINGS:  Single AP view of the chest. Sternotomy wires. Right IJ central venous  catheter terminates over the SVC. Mediastinal clips. Epigastric  surgical clips.    Cardiomediastinal silhouette within normal limits.  Linear opacities  at the right lung base.  No pneumothorax.  No pleural effusion.       Impression    IMPRESSION:  1.  Right IJ central venous catheter terminates over the SVC.  2.  Linear opacities at the right lung base suggests atelectasis.    I have personally reviewed the examination and initial interpretation  and I agree with the findings.    DINO LAWS MD         SYSTEM ID:  [id number]   US Upper Ext Venous Duplex Limited Bilat    Narrative    Exam: Duplex Doppler ultrasound assessment of the upper extremities  veins bilaterally 8/11/2023 10:00 AM    Clinical information: Metastatic HCC (s/p transplant); r/o DVT    Ordering provider: Perry Powell MD    Comparison:     Technique: B-mode (grayscale) and duplex Doppler ultrasound of the  upper extremity  veins, including compressibility when feasible.  Velocity measurements obtained with angle correct at or less than 60  degrees.     Findings:     Right upper extremity:    Internal jugular vein: Thrombus: No, Phasic: Yes    Innominate vein: Thrombus: No, Phasic: Yes    Subclavian vein proximal: Thrombus: No, Phasic: Yes  Subclavian vein mid: Thrombus: No, Phasic: Yes    Axillary vein: Thrombus: No, Phasic: Yes    Right upper extremity veins demonstrate normal compressibility,  phasicity, and pulsatility.      Left upper extremity:    Internal jugular vein: Thrombus: No, Phasic: Yes    Innominate vein: Thrombus: No, Phasic: Yes    Subclavian vein proximal: Thrombus: No, Phasic: Yes  Subclavian vein mid: Thrombus: No, Phasic: Yes    Axillary vein: Thrombus: No, Phasic: Yes    Left upper extremity veins demonstrate normal compressibility,  phasicity, and pulsatility.          Impression    Impression:    1. Right upper extremity: No deep vein thrombosis.    2. Left upper extremity: No deep vein thrombosis.    I have personally reviewed the examination and initial interpretation  and I agree with the findings.    MIGUEL ANGEL TURPIN MD         SYSTEM ID:  [id number]   US Lower Extremity Venous Duplex Bilateral    Narrative    Exam: Ultrasound of the deep venous system of bilateral legs dated  8/11/2023 10:03 AM    Clinical information: Metastatic HCC (s/p transplant); r/o DVT    Comparison: Ultrasound 7/13/2021    Ordering provider: Perry Powell MD    Technique: Moon-scale evaluation with compression and Doppler  assessment of deep venous system for spontaneous and phasic flow, as  well as the presence of distal augmentation. Color flow images  obtained as needed. Gray-scale images with compression of the great  saphenous vein obtained as needed.    Findings:    Right leg:    CFV: Thrombus: No, Phasic: Yes  Femoral vein, proximal: Thrombus: No, Phasic: Yes  Femoral vein, mid: Thrombus: No, Phasic: Yes  Femoral vein, distal:  "Thrombus: No, Phasic: Yes  Popliteal vein: Thrombus: No, Phasic: Yes  PTV: Thrombus: No  Peroneal vein: Not visualized.     Left leg:    CFV: Thrombus: No, Phasic: Yes  Femoral vein, proximal: Thrombus: No, Phasic: Yes  Femoral vein, mid: Thrombus: No, Phasic: Yes  Femoral vein, distal: Thrombus: No, Phasic: Yes  Popliteal vein: Thrombus: No, Phasic: Yes  PTV: Thrombus: Thrombus: No  Peroneal vein: Not visualized.      Impression    Impression:    Right leg: No deep venous thrombosis. The peroneal vein was not  visualized.    Left leg: No deep venous thrombosis. The peroneal vein was not  visualized.    Reference: \"Duplex Ultrasound in the Diagnosis of Lower-Extremity Deep  Venous Thrombosis\"- Charlene Esteban MD, S; Julian Zhou MD  (Circulation. 2014;129:917-921. http://circ.ahajournals.org )    I have personally reviewed the examination and initial interpretation  and I agree with the findings.    MIGUEL ANGEL TURPIN MD         SYSTEM ID:  [id number]     "

## 2023-08-11 NOTE — PROGRESS NOTES
ICU End of Shift Summary. See flowsheets for vital signs and detailed assessment.    Changes this shift: Floor orders obtained transfer to med/surg with tele. BUE and BLE ultrasounds done to r/o DVTs. Repeat EKG. Mag replaced per protocol. A&Ox4, PERRLA, complains of general, diffused, abdominal pain. Afebrile, SR/ST 80's-110's, VSS on RA, LS clear. Renal diet, no BM, intermittent nausea throughout the day, one emesis. Voids spontaneously in urinal.    Plan: Transfer to med/surg with tele when bed becomes available. Encourage PO intake.      Goal Outcome Evaluation:    Plan of Care Reviewed With: patient    Overall Patient Progress: improving    Outcome Evaluation: Patient transitioned to floor status. BUE and BLE US obtained, negative for DVTs.

## 2023-08-11 NOTE — PHARMACY-VANCOMYCIN DOSING SERVICE
Pharmacy Vancomycin Note  Date of Service 2023  Patient's  1965   58 year old, male    Recent Vancomycin Levels (past 3 days)  2023:  6:40 AM Vancomycin 18.6 ug/mL    Vancomycin IV Administrations (past 72 hours)                     vancomycin (VANCOCIN) 2,000 mg in sodium chloride 0.9 % 500 mL intermittent infusion (mg) 2,000 mg New Bag 08/10/23 [id number]                  Plan:  Team discontinued vancomycin.     Deena Evans, PharmD  [facility] Pharmacist  [phone]  [phone]

## 2023-08-11 NOTE — PLAN OF CARE
Occupational Therapy: Orders received. Chart reviewed and discussed with care team.? Occupational Therapy not indicated as pt close to baseline with all ADL and mobilizing well with PT. No acute OT needs at this time. Defer discharge recommendations to PT.? Will complete orders.

## 2023-08-11 NOTE — PROGRESS NOTES
08/11/23 1100   Appointment Info   Signing Clinician's Name / Credentials (PT) rah enriquez, pt   Living Environment   People in Home alone   Current Living Arrangements house   Home Accessibility stairs to enter home;stairs within home   Number of Stairs, Main Entrance 8   Stair Railings, Main Entrance railing on left side (ascending)   Number of Stairs, Within Home, Primary greater than 10 stairs   Stair Railings, Within Home, Primary railing on left side (ascending)   Transportation Anticipated car, drives self   Living Environment Comments PCA mon-thurs 4-6 hrs day   Self-Care   Usual Activity Tolerance good   Regular Exercise Yes   Activity/Exercise Type walking   Exercise Amount/Frequency greater than 1 hr;daily   Equipment Currently Used at Home cane, straight  (uses cane only in winter)   Fall history within last six months yes   Number of times patient has fallen within last six months 1   Activity/Exercise/Self-Care Comment IND with functional mobility and ADLs; PCA helps with heavy household tasks   General Information   Onset of Illness/Injury or Date of Surgery 08/10/23   Referring Physician Toribio Oliva MD   Patient/Family Therapy Goals Statement (PT) return home   Pertinent History of Current Problem (include personal factors and/or comorbidities that impact the POC) Frandy Workman is a 58 year old male with PMH cirrhosis s/p liver transplant, recurrent HCC, CKD, DMII, and CAD s/p CABG who was admitted on 8/10/2023 with vomiting and diarrhea. Found to have acute renal failure, hyperkalemia, and metabolic acidosis requiring ICU admission and emergent dialysis.   Cognition   Affect/Mental Status (Cognition) WNL   Orientation Status (Cognition) oriented x 4   Follows Commands (Cognition) WNL   Posture    Posture Forward head position;Protracted shoulders   Range of Motion (ROM)   ROM Comment B UE/LE grossly WFL   Strength (Manual Muscle Testing)   Strength Comments B UE/LE grossly 5/5   Bed Mobility    Comment, (Bed Mobility) supine > sit IND   Transfers   Comment, (Transfers) sit > stand IND   Gait/Stairs (Locomotion)   Comment, (Gait/Stairs) ambulated in room with SBA   Balance   Balance Comments maintainted standing balance without UE support   Sensory Examination   Sensory Perception Comments reports B LE neuropathy but light touch/proprioception intact B LB   Coordination   Coordination no deficits were identified   Muscle Tone   Muscle Tone no deficits were identified   Clinical Impression   Criteria for Skilled Therapeutic Intervention Yes, treatment indicated   PT Diagnosis (PT) impaired functional mobility in setting of acute renal failure   Influenced by the following impairments decreased activity tolerance   Functional limitations due to impairments impaired gait   Clinical Presentation (PT Evaluation Complexity) Stable/Uncomplicated   Clinical Presentation Rationale clinical judgement   Clinical Decision Making (Complexity) low complexity   Planned Therapy Interventions (PT) gait training;progressive activity/exercise   Risk & Benefits of therapy have been explained evaluation/treatment results reviewed;care plan/treatment goals reviewed   PT Total Evaluation Time   PT Eval, Low Complexity Minutes (16305) 8   Plan of Care Review   Plan of Care Reviewed With patient   Physical Therapy Goals   PT Frequency 2x/week   PT Predicted Duration/Target Date for Goal Attainment 08/16/23   PT Goals Gait;Stairs   PT: Gait Independent;Greater than 200 feet;Goal Met;Completed   PT: Stairs Modified independent;Greater than 10 stairs;Rail on left   PT Discharge Planning   PT Plan stairs   PT Discharge Recommendation (DC Rec) home   PT Rationale for DC Rec anticipate patient will be IND with functional mobility for household distances during expected length of acute stay   PT Brief overview of current status transferred OOB IND.  ambulated 400' IND.  stairs x 8 with rail and SBA.   Total Session Time   Total Session  Time (sum of timed and untimed services) 8

## 2023-08-11 NOTE — PROGRESS NOTES
CLINICAL NUTRITION SERVICES - ASSESSMENT NOTE     Nutrition Prescription    RECOMMENDATIONS FOR MDs/PROVIDERS TO ORDER:  Recommend 1 tablet Renavite daily while on iHD    Malnutrition Status:    Severe malnutrition in the context of acute illness    Recommendations already ordered by Registered Dietitian (RD):  Supplements: PRN per pt request (wrote Ensure and Special K Protein bar on his renal diet menu)    Future/Additional Recommendations:  Monitor PO adequacy, use of supplements, and wt trends.      REASON FOR ASSESSMENT  Frandy Workman is a/an 58 year old male assessed by the dietitian for Admission Nutrition Risk Screen for positive    Per H&P: Has not had much to eat or drink in the past few days.     NUTRITION HISTORY  Pt known to Clinical Nutrition in 7/2021 for post-CABG diet education.    No food allergies.    Met with pt at bedside: Pt reports lack of appetite for months PTA d/t chemotherapy medication. He wasn't eating as well d/t this. He acknowledges N/V onset prior to admission.    CURRENT NUTRITION ORDERS  Diet: Renal  Intake/Tolerance: Pt feeling better today, in better spirits. He had a yogurt and apple juice at breakfast today which he tolerated well. He's interested in nutrition drinks and protein bars but doesn't want them scheduled, he wants to request them as desired.    LABS  Labs reviewed  Electrolytes  Potassium (mmol/L)   Date Value   08/11/2023 4.1   08/11/2023 4.1   08/11/2023 3.9   07/20/2022 4.7   07/13/2022 6.2 (HH)   06/08/2022 4.6   06/28/2021 4.6   06/14/2021 4.7   06/04/2021 4.6     Potassium POCT (mmol/L)   Date Value   08/10/2023 7.7 (HH)     Phosphorus (mg/dL)   Date Value   08/10/2023 6.0 (H)   07/28/2023 3.4   03/30/2023 3.4   03/15/2023 3.4   09/07/2022 3.4   02/26/2021 4.0   09/25/2020 3.4   09/16/2020 3.9   08/19/2020 3.5   08/12/2020 3.5    Blood Glucose  Glucose (mg/dL)   Date Value   08/11/2023 119 (H)   08/10/2023 113 (H)   08/10/2023 130 (H)   07/20/2022 269 (H)    07/13/2022 202 (H)   06/08/2022 105 (H)   04/20/2022 130 (H)   04/06/2022 165 (H)   06/28/2021 208 (H)   06/14/2021 173 (H)   06/04/2021 156 (H)   05/05/2021 258 (H)   04/20/2021 254 (H)     GLUCOSE BY METER POCT (mg/dL)   Date Value   08/11/2023 107 (H)   08/11/2023 122 (H)   08/10/2023 86   08/10/2023 89   08/10/2023 97     Glucose Whole Blood POCT (mg/dL)   Date Value   08/10/2023 122 (H)     Hemoglobin A1C (%)   Date Value   06/14/2023 6.3 (H)   12/14/2022 6.9 (H)   01/10/2022 6.0 (H)   07/13/2021 6.8 (H)   12/30/2020 6.0 (H)   06/13/2020 6.3 (H)   08/08/2018 6.6 (H)   06/09/2017 6.5 (H)   10/25/2016 7.8 (H)    Inflammatory Markers  CRP Inflammation (mg/L)   Date Value   01/11/2022 44.0 (H)     WBC (10e9/L)   Date Value   06/28/2021 4.4   06/14/2021 6.1   06/04/2021 5.1     WBC Count (10e3/uL)   Date Value   08/10/2023 5.2   08/10/2023 8.8   08/10/2023 10.3     Albumin (g/dL)   Date Value   08/10/2023 3.4 (L)   08/10/2023 4.3   08/10/2023 4.4   07/20/2022 4.3   07/13/2022 4.3   06/08/2022 4.1   06/28/2021 4.0   06/14/2021 4.1   06/04/2021 4.2      Magnesium (mg/dL)   Date Value   08/11/2023 1.6 (L)   08/10/2023 2.1   08/10/2023 2.3   06/04/2021 2.2   04/20/2021 2.2   03/26/2021 2.2     Sodium (mmol/L)   Date Value   08/11/2023 142   08/10/2023 141   08/10/2023 138   06/28/2021 137   06/14/2021 139   06/04/2021 138     Sodium POCT (mmol/L)   Date Value   08/10/2023 139    Renal  Urea Nitrogen (mg/dL)   Date Value   08/11/2023 62.0 (H)   08/10/2023 105.0 (H)   08/10/2023 117.0 (H)   07/20/2022 32 (H)   07/13/2022 37 (H)   06/08/2022 35 (H)   06/28/2021 33 (H)   06/14/2021 29   06/04/2021 39 (H)     Creatinine (mg/dL)   Date Value   08/11/2023 4.29 (H)   08/10/2023 6.77 (H)   08/10/2023 7.38 (H)   06/28/2021 1.62 (H)   06/14/2021 1.54 (H)   06/04/2021 1.64 (H)     Additional  Triglycerides (mg/dL)   Date Value   07/28/2023 405 (H)   06/14/2023 394 (H)   12/14/2022 498 (H)   09/25/2020 228 (H)   07/29/2020 181 (H)  "  02/04/2019 117     Ketones Urine (mg/dL)   Date Value   08/11/2023 Negative   12/04/2019 Negative        MEDICATIONS  Medications reviewed    ANTHROPOMETRICS  Height: 175.3 cm (5' 9\")  Most Recent Weight: 85.1 kg (187 lb 9.8 oz)    IBW: 72.7 kg (114% IBW)  BMI: Overweight BMI 25-29.9  Weight History: 8.3% wt loss over past month.  Wt Readings from Last 20 Encounters:   08/11/23 85.1 kg (187 lb 9.8 oz)   08/10/23 82.6 kg (182 lb)   08/09/23 84.9 kg (187 lb 3.2 oz)   07/26/23 88.5 kg (195 lb)   07/20/23 89.8 kg (198 lb)   07/13/23 92.4 kg (203 lb 11.3 oz)   07/03/23 92.9 kg (204 lb 14.4 oz)   06/15/23 90.3 kg (199 lb)   05/17/23 95.4 kg (210 lb 4.8 oz)   05/03/23 93 kg (205 lb)   05/02/23 93.3 kg (205 lb 11.2 oz)   03/20/23 93.5 kg (206 lb 1.8 oz)   03/20/23 93.8 kg (206 lb 11.2 oz)   02/22/23 93.2 kg (205 lb 8 oz)   01/24/23 88.5 kg (195 lb)   01/14/23 88.5 kg (195 lb)   01/03/23 90.7 kg (200 lb)   12/27/22 91 kg (200 lb 11.2 oz)   09/12/22 81.6 kg (180 lb)   03/21/22 76.6 kg (168 lb 12.8 oz)   Dosing Weight: 83 kg (actual, based on lowest/driest wt this admit of 82.6 kg on 8/10)    ASSESSED NUTRITION NEEDS  Estimated Energy Needs: 9826-3882 kcals/day (25 - 30 kcals/kg)  Justification: Maintenance  Estimated Protein Needs: 108-149 grams protein/day (1.3 - 1.8 grams of pro/kg)  Justification: Dialysis and Hypercatabolism with critical illness  Estimated Fluid Needs: Per provider pending fluid status    PHYSICAL FINDINGS  See malnutrition section below.  -Nailbeds slightly blue, cold fingers - pt reports this is baseline   -Edentulous - writer did not ask if he has dentures    MALNUTRITION  % Intake: < 75% for > 7 days (moderate)  % Weight Loss: > 5% in 1 month (severe)  Subcutaneous Fat Loss: Upper arm:  Mild and Lower arm:  Moderate  Muscle Loss: Thoracic region (clavicle, acromium bone, deltoid, trapezius, pectoral):  Mild, Upper arm (bicep, tricep):  Moderate, Lower arm  (forearm):  Moderate, Upper leg " (quadricep, hamstring):  Moderate, and Patellar region:  Moderate -- pt endorses generalized muscle loss  Fluid Accumulation/Edema: None noted  Malnutrition Diagnosis: Severe malnutrition in the context of acute illness    NUTRITION DIAGNOSIS  Inadequate oral intake related to decreased appetite, N/V in setting of chemotherapy as evidenced by pt report of symptoms starting with chemo, wt loss, and muscle/fat loss.       INTERVENTIONS  Implementation  -Nutrition Education: Provided education on RD role, role of NFPE. Discussed ONS options and wrote a couple on his menu that he might be interested in.   -Medical food supplement therapy     Goals  Patient to consume % of nutritionally adequate meal trays TID, or the equivalent with supplements/snacks.     Monitoring/Evaluation  Progress toward goals will be monitored and evaluated per protocol.  Zelda Colon RD, LD  Pager: [phone]

## 2023-08-11 NOTE — PROGRESS NOTES
Admitted/transferred from: ED at 21:15  Reason for admission/transfer: Hyperkalemia requiring emergent HD  Patient status upon admission/transfer: Neuro intact. S.tach 103, BP stable, on RA. Sodium bicarb infusing at 100 mL/hr. Able to ambulate from stretcher to bed. BG in 80's.  Interventions: 12-lead completed; labs obtained; consent for HD line, providers at bedside to place.  Plan: Obtain access and start emergent HD run. Monitor labs.   2 RN skin assessment: completed by Sarah SKINNER  Sexual Orientation and Gender Identity (SOGI) smartfom completed: Done  Result of skin assessment and interventions/actions: blotchy, flat, red rash on scalp, neck and left armpit; small scab right side of nose; irritated red spot (in-grown hair?) right armpit  Height, weight, drug calc weight: Done  Patient belongings (see Flowsheet - Adult Profile for details): remain with patient; will send home medication with caretaker (Gabrielle) tomorrow  MDRO education (if applicable):  Done

## 2023-08-11 NOTE — PHARMACY-VANCOMYCIN DOSING SERVICE
"Pharmacy Vancomycin Initial Note  Date of Service August 10, 2023  Patient's  1965  58 year old, male    Indication: Sepsis    Current estimated CrCl = Estimated Creatinine Clearance: 12.6 mL/min (A) (based on SCr of 7.38 mg/dL (H)).    Creatinine for last 3 days  8/10/2023: 10:24 AM Creatinine 6.50 mg/dL;  6:33 PM Creatinine 7.38 mg/dL    Recent Vancomycin Level(s) for last 3 days  No results found for requested labs within last 3 days.      Vancomycin IV Administrations (past 72 hours)        No vancomycin orders with administrations in past 72 hours.                    Nephrotoxins and other renal medications (From now, onward)      Start     Dose/Rate Route Frequency Ordered Stop    08/10/23 2030  vancomycin (VANCOCIN) 2,000 mg in sodium chloride 0.9 % 500 mL intermittent infusion         2,000 mg  over 120 Minutes Intravenous ONCE 08/10/23 1959      08/10/23 1959  vancomycin place sahni - receiving intermittent dosing         1 each Intravenous SEE ADMIN INSTRUCTIONS 08/10/23 2002      08/10/23 1955  piperacillin-tazobactam (ZOSYN) 2.25 g vial to attach to  ml bag        Note to Pharmacy: For [facility], [facility] and [facility]: For Zosyn-naive patients, use the \"Zosyn initial dose + extended infusion\" order panel.    2.25 g  over 30 Minutes Intravenous EVERY 6 HOURS 08/10/23 1953              Contrast Orders - past 72 hours (72h ago, onward)      None                    Plan:  Give vancomycin 2000mg IV x1 now, will dose intermittently based on levels for now given JERONIMO on admit   Vancomycin monitoring method: Trough (Method 2 = manual dose calculation)  Vancomycin therapeutic monitoring goal: 15-20 mg/L  Pharmacy will check vancomycin levels as appropriate in 1-3 Days.    Serum creatinine levels will be ordered daily    Emigdio Colon, PharmD, BCPS    "

## 2023-08-11 NOTE — PROGRESS NOTES
BRIEF SOCIAL WORK NOTE:    SW scanned and emailed patient's updated healthcare directive/living will to [facility] [employer]. SW dropped a copy off to patient's room in case he needs it.     FARHAD Whitman  ICU   Phone: [phone]  Pager: [phone]

## 2023-08-11 NOTE — PROGRESS NOTES
"Critical Care Services Admit Note:  I personally examined and evaluated the patient today. I formulated today s plan with the house staff team or resident(s) and agree with the findings and plan in the associated note (see separately attested resident note).   I have evaluated all laboratory values and imaging studies from the past 24 hours.  Summary of hospital course:  58 year old male with cirrhosis, HCC, s/p liver transplant, CAD, portal vein thrombosis. Now with peritoneal metastatic diease. Recently started on sorafenib. Has had nausea, decrease appetite. Found to have JERONIMO with hyperkalemia. Admitted to the ICU.     Overnight events/pertinent findings today:  /61   Pulse 96   Temp 98.5  F (36.9  C) (Oral)   Resp 17   Ht 1.753 m (5' 9\")   Wt 86 kg (189 lb 9.5 oz)   SpO2 100%   BMI 28.00 kg/m      Data  Na 141, K 8 -->7.7-->5.9  Cr 6.77  VBG 7.07/31/25  WBC 5.2, Hgb 10.3, Plts 120    ECG with peaked T waves.     Assessment/plan:    JERONIMO   Hyperkalemia  Metabolic Acidosis    JERONIMO could be due to volume depletion, lisinopril, leading to ATN. Dialysis catheter has been placed.  Will dialyze tonight given life-threatening hyperkalemia (peaked T waves). Has already received calcium. Is on NaHCO3 infusion. Will monitor labs.  Nephrology consulted.       Rest per resident note.    I spent a total of 40 minutes (excluding procedure time) personally providing and directing critical care services at the bedside and on the critical care unit for this patient.     Markos Santana MD    "

## 2023-08-11 NOTE — H&P
MEDICAL ICU H&P  08/10/2023    Date of Hospital Admission: 08/10/2023  Date of ICU Admission: 08/10/2023  Reason for Critical Care Admission: Emergent hemodialysis  Date of Service (when I saw the patient): 08/10/2023    ASSESSMENT: Frandy Workman is a 58 year old male with PMH cirrhosis s/p liver transplant, recurrent HCC, CKD, DMII, and CAD s/p CABG who was admitted on 8/10/2023 with vomiting and diarrhea. Found to have acute renal failure, hyperkalemia, and metabolic acidosis requiring ICU admission and emergent dialysis.    PLAN:    Neuro:  # Pain and sedation  Patient is awake and conversant. Denies any significant pain currently.   - RASS goal 0/-1  - PRN tylenol    Pulmonary:  # Productive cough  # Possible viral URI  Patient's caretaker had a URI recently, and he recently developed a productive cough. No fevers or leukocytosis here. No consolidation on CXR. Low concern for pneumonia or septic picture, do not think he needs antibiotics. Possible he has a viral URI.   - Check for Covid, influenza, and RSV in respiratory panel  - Follow blood cultures ordered in ED  - Discontinue vanc/zosyn ordered by ED    Cardiovascular:  # CAD s/p 2 vessel CABG to LAD and OM  # HTN  # HLD  S/p CABG 7/2021.  - hold metoprolol and lisinopril due to JERONIMO. Resume once stable  - check CK, if normal continue statin  - MAP goal >65    # Elevated troponin  Troponin elevated to 66 on admission. No ST segment changes on EKG, suspect a type 2 demand ischemia.  - trend troponin to peak    GI/Nutrition:  # ESTRADA cirrhosis c/b portal vein thrombosis  # HCC s/p TACE 1/2019, now recurrent w/ mets to peritoneum recently initiated on Sorafenib  #HBcAg +ve donor  # s/p Liver transplant 11/2019  # Immunosuppressed  # Hx of Esophageal varices  He had a liver transplant in 2019 for HCC and cirrhosis. In June 2023, found to have new biopsy proven peritoneal metastases of HCC. Started on oral chemotherapy (sorafenib 400mg BID) on 7/29/23, held  today by oncology due to vomiting and diarrhea. Immunosuppression with prednisone and mycophenolate. LFTs on admission wnl. CT abdomen with stable post-surgical changes of transplant liver.  - Monitor MELD labs  - Discussed w/ GI overnight, suggested formal consult to transplant hepatology in the AM  - Hold mycophenolate 250mg BID  - Continue prednisone 5 mg  - Hold Lamivudine 100mg for now, pharmacy re-dose for kidney impairment tomorrow    # Elevated lipase  Lipase elevated to 797. No abdominal tenderness on exam. He has had nausea and vomiting, but this could be attributed to uremia with JERONIMO and medication s/e. No apparent fat stranding on abdominal CT per our read, no concerns per discussion with radiology.     - consider rechecking lipase tomorrow    # Nausea/Vomiting  # Diarrhea  4 days of N/V/D. Suspect 2/2 to chemotherapy and uremia/JERONIMO. Possible differential is  viral gastroenteritis.  no leukocytosis or fever. Expect sx to improve with hemodialysis.  - Zofran prn    # Anorexia  # Nutrition  Patient has not been maintaining adequate hydration for the past few days. Did get 1L LR in ED.   - Will keep NPO until after dialysis run but anticipate he can start a renal diet afterwards    Renal/Fluids/Electrolytes:  # Hyperkalemia  Potassium 8 on admission. EKG with peaked T waves, Qtc 422. In the ED, given calcium gluconate, insulin and dextrose. HyperK likely due to recent diarrhea, metabolic acidosis, and renal failure. EKG upon arrival in the ICU with resolution of peaked T waves. CVC placed for initiation of hemodialysis.  - Nephrology consulted, appreciate recs  - Dialysis line placed  - Start hemodialysis  - K and VBG every 2 hours  - CXR for confirmation of CVC positioning    # Hyperchloremic non-anion gap metabolic acidosis  # Anion gap metabolic acidosis  VBG with pH 7.07, bicarb 9, and anion gap 18 on admission. Delta delta ratio is 0.4. There is likely a combination of anion gap and non-anion gap  metabolic acidosis. Anion gap acidosis could be due to uremia with renal failure or starvation ketoacidosis as he has not been eating well for the last few days. Low concern for lactic acidosis with normal lactate. NAGMA likely 2/2 bicarb losses due to recent diarrhea, type 2 RTA less likely given he has hyperkalemia and not hypokalemia.  - Bicarb drip  - beta-hydroxybutyrate    # JERONIMO on CKD stage III, intrarenal   # Suspect ATN  # Diabetic nephropathy  Baseline Cr at 2, now elevated to 7.38. BUN:Cr ratio 15. With intrarenal picture, ATN is most likely. This could be due to fluid losses with recent vomiting and diarrhea. Nephrotoxic medications include mycophenolate and lisinopril, both held. Will further evaluate and monitor urine output.  - Renal consulted   - hemodialysis as above  - Strict I/Os  - UA, urine Na    Endocrine:  # DMII  Last A1c 6.3. Well-controlled with outpatient regimen of basal insulin 34 units, carb count insulin, metformin, and empagliflozin. Did receive insulin bolus w/ dextrose for hyperkalemia. Sugars afterwards in the 80s.  - Hold PTA diabetes meds  - Basal insulin 15 units daily  - LDSS    ID:  No acute problems    Hematology:    # Chronic anemia, macrocytic  Baseline hgb 11, stable here. Mixed picture with macrocytic and normocytic. Could be secondary to anemia of chronic disease vs medication adverse effects vs nutritional deficiencies. Does receive EPO per renal notes.  - Monitor hgb, transfuse <7    Musculoskeletal:  No acute problems    Skin:  # Erythematous macular rash  Erythematous macular rash on the neck and chest. Patient states this developed today. Does not appear to be urticaria. It could be an adverse effect of sorafenib.  - monitor    Psych:  # Bipolar disorder  Was previously on lithium, but no recent PTA meds.    General Cares/Prophylaxis:    DVT Prophylaxis: Heparin SQ  GI Prophylaxis: PPI  Restraints: None  Family Communication: Friend Gabrielle ([phone])  Code  "Status: Full Code    Lines/tubes/drains:  - R internal jugular double lumen CVC  - PIV x3    Disposition:  - Medical ICU    Patient seen and findings/plan discussed with medical ICU staff, Dr. Santana.    Neal Kamara, MS4    Resident/Fellow Attestation   I, Toribio Oliva MD, was present with the medical/SERA student who participated in the service and in the documentation of the note.  I have verified the history and personally performed the physical exam and medical decision making.  I agree with the assessment and plan of care as documented in the note.      Toribio Oliva MD  Internal Medicine Resident  Date of Service (when I saw the patient): 08/11/23       Clinically Significant Risk Factors Present on Admission        # Hyperkalemia: Highest K = 8 mmol/L in last 2 days, will monitor as appropriate        # Coagulation Defect: INR = 1.16 (Ref range: 0.85 - 1.15) and/or PTT = N/A, will monitor for bleeding  # Drug Induced Platelet Defect: home medication list includes an antiplatelet medication  # Acute Kidney Injury, unspecified: based on a >150% or 0.3 mg/dL increase in last creatinine compared to past 90 day average, will monitor renal function  # Hypertension: Noted on problem list      # Overweight: Estimated body mass index is 26.88 kg/m  as calculated from the following:    Height as of an earlier encounter on 8/10/23: 1.753 m (5' 9\").    Weight as of this encounter: 82.6 kg (182 lb).              -----------------------------------------------------------------------    HISTORY PRESENTING ILLNESS:   Frandy Workman is a 58 y.o. male with PMH of liver cirrhosis 2/2 ESTRADA, HCC s/p TACE and liver transplant now with peritoneal metastasis, portal vein thrombosis, CKD, DMII, HTN, CAD s/p 2 vessel CABG, anemia of chronic disease, and bipolar disorder.     Per chart review, the patient had a virtual heme/onc visit today where he reported 4 days of abdominal pain, vomiting, and diarrhea. He recently started oral " chemotherapy (sorafenib) on 7/29/23. Has been unable to tolerate PO intake due to sx and feels dizzy, fatigued, and with decreased urine output. He has been taking Zofran for N/V. His caretaker is sick with a URI, and the patient is also having chills, productive cough, hot flashes, and myalgias. He was advised to go to the ED.    In the ED, labs showed significant electrolyte abnormalities with K of 8 and bicarb of 8. Creatinine is up to 7 from baseline of 2. EKG with peaked T waves. He was given calcium gluconate, insulin w/ dextrose, and bicarb drip. Nephrology is consulted and he is transferred to the ICU with plans for dialysis.    In the ICU, the patient is alert and conversant. He reports anxiety and shaking. States he did make some urine before arriving in the ED today. Has not had much to eat or drink in the past few days. States his abdomen feels slightly distended. Also noted a new erythematous rash on his neck extending to his face and chest. No chest pain other than when he is coughing. No fevers, shortness of breath, or leg swelling.      REVIEW OF SYSTEMS: As noted in HPI.    PAST MEDICAL HISTORY:   Past Medical History:   Diagnosis Date    Anemia 2013    Arthritis     BPH (benign prostatic hyperplasia)     CAD (coronary artery disease) 04/01/2019    Cholelithiasis     Conductive hearing loss 08/16/2017    Depressive disorder 1986    Suffer effects throughout life    Gastroesophageal reflux disease 12/01/2014    HCC (hepatocellular carcinoma) (H) 01/22/2019    History of diabetic retinopathy 07/2018    HTN (hypertension) 11/20/2019    Hyperlipidemia     Liver cirrhosis secondary to ESTRADA (H)     Liver transplanted (H) 11/11/2019    Portal vein thrombosis 04/11/2019    Type II diabetes mellitus (H)      SURGICAL HISTORY:  Past Surgical History:   Procedure Laterality Date    BYPASS GRAFT ARTERY CORONARY N/A 7/14/2021    Procedure: median sternotomy, on cardiopulmonary bypass, CORONARY ARTERY BYPASS  GRAFT (CABG) x2 with left greater saphenous vein endoscopic harvest and left internal mammery artery harvest;  Surgeon: Tom Zapata MD;  [facility]    COLONOSCOPY      2015    COLONOSCOPY N/A 12/6/2019    Procedure: COLONOSCOPY, WITH POLYPECTOMY AND BIOPSY;  Surgeon: Adam Morton MD;  [facility]    CV CENTRAL VENOUS CATHETER PLACEMENT N/A 7/12/2021    Procedure: Central Venous Catheter Placement;  Surgeon: Fermin Polanco MD;  [facility]    CV CORONARY ANGIOGRAM N/A 7/12/2021    Procedure: Coronary Angiogram;  Surgeon: Fermin Polanco MD;  [facility]    CV HEART CATHETERIZATION WITH POSSIBLE INTERVENTION N/A 2/26/2019    Procedure: CORS;  Surgeon: Jagdish Hoyt MD;  [facility]    CV INTRA AORTIC BALLOON N/A 7/12/2021    Procedure: Intra Aortic Balloon Pump Insertion;  Surgeon: Fermin Polanco MD;  [facility]    ESOPHAGOSCOPY, GASTROSCOPY, DUODENOSCOPY (EGD), COMBINED N/A 11/17/2016    Procedure: COMBINED ESOPHAGOSCOPY, GASTROSCOPY, DUODENOSCOPY (EGD);  Surgeon: Santi Rosas MD;  [facility]    ESOPHAGOSCOPY, GASTROSCOPY, DUODENOSCOPY (EGD), COMBINED N/A 11/17/2017    Procedure: COMBINED ESOPHAGOSCOPY, GASTROSCOPY, DUODENOSCOPY (EGD);  EGD;  Surgeon: Santi Rosas MD;  [facility]    ESOPHAGOSCOPY, GASTROSCOPY, DUODENOSCOPY (EGD), COMBINED N/A 12/28/2018    Procedure: EGD;  Surgeon: Santi Rosas MD;  [facility]    ESOPHAGOSCOPY, GASTROSCOPY, DUODENOSCOPY (EGD), COMBINED N/A 12/6/2019    Procedure: ESOPHAGOGASTRODUODENOSCOPY, WITH BIOPSY;  Surgeon: Adam Morton MD;  [facility]    ESOPHAGOSCOPY, GASTROSCOPY, DUODENOSCOPY (EGD), COMBINED N/A 2/13/2020    Procedure: ESOPHAGOGASTRODUODENOSCOPY (EGD);  Surgeon: Santi Rosas MD;  [facility]    EXCISE MASS ABDOMEN N/A 7/13/2023     Procedure: Laparoscopic lysis of adhesions, laparoscopic resection of abdominal mass;  Surgeon: Ruiz Chu MD;  [facility]    HEAD & NECK SURGERY      2017 at [facility].     IMPLANT GOLD WEIGHT EYELID Right 2017    Procedure: IMPLANT WEIGHT EYELID;  Right Upper Eyelid Weight, right tarsal strip lower eyelid;  Surgeon: Milana Malave MD;  [facility]    IR CHEMO EMBOLIZATION  2019    KNEE SURGERY Left     ORTHOPEDIC SURGERY      PAROTIDECTOMY, RADICAL NECK DISSECTION Right 2017    Procedure: PAROTIDECTOMY, RADICAL NECK DISSECTION;  Right Superfacial Parotidectomy , Facial nerve repair. with [facility] facial nerve monitor.;  Surgeon: Asiya Morgan MD;  [facility]    PHACOEMULSIFICATION CLEAR CORNEA WITH STANDARD INTRAOCULAR LENS IMPLANT Right 2023    Procedure: PHACOEMULSIFICATION, COMPLEX CATARACT, WITH INTRAOCULAR LENS IMPLANT WITH TRYPAN RIGHT EYE;  Surgeon: Enriqueta Martin MD;  [facility]    PHACOEMULSIFICATION WITH STANDARD INTRAOCULAR LENS IMPLANT Left 1/3/2023    Procedure: PHACOEMULSIFICATION, COMPLEX CATARACT, WITH STANDARD INTRAOCULAR LENS IMPLANT INSERTION LEFT EYE;  Surgeon: Enriqueta Martin MD;  [facility]    PICC INSERTION Left 2017    4fr SL BioFlo PICC, 44cm in the L basilic vein w/ tip in the low SVC    RETURN LIVER TRANSPLANT N/A 2019    Procedure: Exploratory laparotomy, hematoma evacuation, abdominal washout;  Surgeon: Александр Ramos MD;  [facility]    TRANSPLANT LIVER RECIPIENT  DONOR N/A 2019    Procedure: TRANSPLANT, LIVER, RECIPIENT,  DONOR;  Surgeon: Александр Ramos MD;  [facility]    VASCULAR SURGERY       SOCIAL HISTORY:  Social History     Socioeconomic History    Marital status:     Highest education level: Bachelor's degree (e.g., BA, AB, BS)   Tobacco Use    Smoking status: Former     Packs/day: 6.00     Years: 30.00     Pack years: 180.00     Types:  Cigars, Cigarettes     Start date: 2016     Quit date: 10/25/2017     Years since quittin.7     Passive exposure: Past    Smokeless tobacco: Former     Types: Chew     Quit date: 10/31/2017    Tobacco comments:     1 tin per week   Vaping Use    Vaping Use: Never used   Substance and Sexual Activity    Alcohol use: No     Alcohol/week: 0.0 standard drinks of alcohol     Comment: quit 1996    Drug use: No    Sexual activity: Not Currently     Partners: Female     Birth control/protection: Condom   Other Topics Concern    Parent/sibling w/ CABG, MI or angioplasty before 65F 55M? Yes   Social History Narrative    Prior , [facility]     Social Determinants of Health     Financial Resource Strain: Low Risk  (10/13/2020)    Overall Financial Resource Strain (CARDIA)     Difficulty of Paying Living Expenses: Not very hard   Food Insecurity: No Food Insecurity (10/13/2020)    Hunger Vital Sign     Worried About Running Out of Food in the Last Year: Never true     Ran Out of Food in the Last Year: Never true   Transportation Needs: No Transportation Needs (10/13/2020)    PRAPARE - Transportation     Lack of Transportation (Medical): No     Lack of Transportation (Non-Medical): No   Physical Activity: Insufficiently Active (10/13/2020)    Exercise Vital Sign     Days of Exercise per Week: 1 day     Minutes of Exercise per Session: 60 min   Stress: Stress Concern Present (10/13/2020)    [place] [employer] - Occupational Stress Questionnaire     Feeling of Stress : To some extent   Social Connections: Socially Isolated (10/13/2020)    Social Connection and Isolation Panel [NHANES]     Frequency of Communication with Friends and Family: Once a week     Frequency of Social Gatherings with Friends and Family: Once a week     Attends Jew Services: Never     Active Member of Clubs or Organizations: No     Attends Club or Organization Meetings: Never     Marital Status:     Intimate Partner Violence: Not At Risk (6/28/2023)    Humiliation, Afraid, Rape, and Kick questionnaire     Fear of Current or Ex-Partner: No     Emotionally Abused: No     Physically Abused: No     Sexually Abused: No   Housing Stability: Low Risk  (10/13/2020)    Housing Stability Vital Sign     Unable to Pay for Housing in the Last Year: No     Number of Places Lived in the Last Year: 2     Unstable Housing in the Last Year: No     FAMILY HISTORY:   Family History   Problem Relation Age of Onset    Skin Cancer Mother     Cancer Mother         mastectomy    Diabetes Mother     Cerebrovascular Disease Mother     Thyroid Disease Mother     Depression Mother     Colon Cancer Father 60    Pancreatic Cancer Father 60    Prostate Cancer Father     Macular Degeneration Father     Glaucoma Father     Skin Cancer Father     Thyroid Disease Sister     Depression Sister     Asthma Sister     Sleep Apnea Brother     Colorectal Cancer Maternal Grandmother     Cancer Maternal Grandmother     Substance Abuse Maternal Grandmother         Alcohol    Prostate Cancer Maternal Grandfather     Substance Abuse Maternal Grandfather         Alcohol    Colorectal Cancer Paternal Grandmother     Liver Disease No family hx of     Melanoma No family hx of     Anesthesia Reaction No family hx of     Deep Vein Thrombosis (DVT) No family hx of      ALLERGIES:   Allergies   Allergen Reactions    Codeine Other (See Comments)     Cannot take due to liver  Cannot tolerate oral narcotics    Seasonal Allergies      Sneezing, coughing, runny and itchy eyes     MEDICATIONS:  aflibercept (EYLEA) injection prefilled syringe 2 mg  aflibercept (EYLEA) injection prefilled syringe 2 mg  dexamethasone (OZURDEX) intravitreal implant 0.7 mg  dexamethasone (OZURDEX) intravitreal implant 0.7 mg  faricimab-svoa (VABYSMO) intravitreal injection 6 mg  faricimab-svoa (VABYSMO) intravitreal injection 6 mg  lidocaine (PF) (XYLOCAINE) injection 1 mL    Alcohol Swabs  (ALCOHOL PREP) 70 % PADS, Use for glucose testing 4x daily  and insulin administration 4x daily.  ammonium lactate (AMLACTIN) 12 % external cream, Apply topically daily as needed for dry skin (on feet)  aspirin (SM ASPIRIN ADULT LOW STRENGTH) 81 MG EC tablet, Take 2 tablets (162 mg) by mouth daily  BD VIKTORIA U/F 32G X 4 MM insulin pen needle, Use 5 per day  benzoyl peroxide 5 % external liquid, Use topically in showers as a body wash  Continuous Blood Gluc Sensor (FREESTYLE CLAUDIA 2 SENSOR) [employer] [facility], 1 each See Admin Instructions Change every 14 days.  empagliflozin (JARDIANCE) 10 MG TABS tablet, Take 1 tablet (10 mg) by mouth daily  insulin aspart (NOVOLOG PEN) 100 UNIT/ML pen, Inject 5-10 Units Subcutaneous 4 times daily (with meals and nightly) 1unit : 8 g carb before meals.  Also add 1 unit : 50 mg/dl >180 before meals and at bedtime.  insulin degludec (TRESIBA FLEXTOUCH) 100 UNIT/ML pen, Inject 34 units subcutaneous once daily  ketoconazole (NIZORAL) 2 % external shampoo, Apply thin layer topically to scalp in shower (leave on 5 min prior to rinse); may also use as a body wash  lamiVUDine (EPIVIR) 100 MG tablet, Take 1 tablet (100 mg) by mouth daily  lisinopril (ZESTRIL) 10 MG tablet, Take 1 tablet (10 mg) by mouth daily  metFORMIN (GLUCOPHAGE XR) 500 MG 24 hr tablet, Take 500 mg by mouth daily  metoprolol succinate ER (TOPROL XL) 25 MG 24 hr tablet, Take 0.5 tablets (12.5 mg) by mouth daily  Multiple Vitamin (TAB-A-GLADIS) TABS, TAKE ONE TABLET BY MOUTH ONCE DAILY  mycophenolate (GENERIC EQUIVALENT) 250 MG capsule, Take 2 capsules (500 mg) by mouth every 12 hours  ondansetron (ZOFRAN ODT) 8 MG ODT tab, Take 1 tablet (8 mg) by mouth every 8 hours as needed for nausea  pantoprazole (PROTONIX) 40 MG EC tablet, Take 1 tablet (40 mg) by mouth daily before breakfast  predniSONE (DELTASONE) 5 MG tablet, Take 1 tablet (5 mg) by mouth daily  rosuvastatin (CRESTOR) 20 MG tablet, Take 1 tablet (20 mg) by mouth daily  tamsulosin  (FLOMAX) 0.4 MG capsule, Take 1 capsule (0.4 mg) by mouth daily  Vitamin D3 50 mcg (2000 units) tablet, Take 1 tablet (50 mcg) by mouth daily        PHYSICAL EXAMINATION:  Temp:  [98.7  F (37.1  C)] 98.7  F (37.1  C)  Pulse:  [111] 111  Resp:  [20] 20  BP: ()/(54-85) 94/54  SpO2:  [98 %] 98 %  General: Alert, conversant, pale  HEENT: subconjunctival hemorrhage in right eye, dry MM  Neuro: A&Ox3, tremors of bilateral arms, answers questions appropriately  Pulm/Resp: Clear breath sounds bilaterally without rhonchi, crackles or wheeze, breathing non-labored  CV: tachycardic rate, regular rhythm, no murmurs auscultated, radial and DP pulses strong and symmetric, cap refill <2s  Abdomen: Soft, mildly distended, non-tender  Incisions/Skin: erythematous macular rash on neck extending midline chest, no rash on palms or soles.  Extremities: No edema    LABS: Reviewed.   Arterial Blood Gases   No lab results found in last 7 days.  Complete Blood Count   Recent Labs   Lab 08/10/23  1844 08/10/23  1833 08/10/23  1024   WBC  --  8.8 10.3   HGB 11.9* 11.9* 13.3   PLT  --  164 178     Basic Metabolic Panel  Recent Labs   Lab 08/10/23  2018 08/10/23  1844 08/10/23  1833 08/10/23  1024   NA  --  139 138 139   POTASSIUM  --  7.7* 8.0* 6.3*   CHLORIDE  --   --  112* 112*   CO2  --   --  8* 9*   BUN  --   --  117.0* 107.0*   CR  --   --  7.38* 6.50*   * 122* 130* 180*     Liver Function Tests  Recent Labs   Lab 08/10/23  1833 08/10/23  1024   AST 22 24   ALT 26 27   ALKPHOS 108 110   BILITOTAL 0.3 0.4   ALBUMIN 4.3 4.4   INR 1.16*  --      Coagulation Profile  Recent Labs   Lab 08/10/23  1833   INR 1.16*       IMAGING:  Recent Results (from the past 24 hour(s))   CT Abdomen Pelvis w/o Contrast    Narrative    EXAM: CT ABDOMEN PELVIS W/O CONTRAST  8/10/2023 6:14 PM     HISTORY:  abdominal pain       COMPARISON:  X-ray 7/28/2023    TECHNIQUE: CT abdomen and pelvis noncontrast; axial slices with  sagittal and coronal  reconstructions. Total DLP: 410 mGy*cm.    FINDINGS:  LUNG BASES:  Mild basilar scattered streaky atelectasis. Otherwise clear lung  bases.    Lower mediastinum: Intact-appearing lower median sternotomy wires.    ABDOMEN:  Liver: Post surgical changes of the liver transplantation.    Gallbladder: Surgically absent.    Pancreas:     Spleen: Within normal limits.    Adrenal glands: No focal nodule/mass.    Kidneys: No suspicious renal lesion/mass. No hydronephrosis. No renal  or urinary collecting system stones. The bladder is partially  decompressed but grossly unremarkable. Mild nonspecific unchanged  perinephric stranding.    Bowel: Normal caliber of the large and small bowel with normal  appendix. Stable small fat-containing lower thoracic/epigastric  ventral hernia (series 5 image 32). Unchanged small fat-containing  umbilical hernia.    PELVIS:  No significant pelvic free fluid. Pelvic phleboliths.    VASCULATURE AND LYMPH NODES:  Mild/moderate aortoiliac atherosclerosis calcification. No  intraperitoneal or retroperitoneal lymphadenopathy.    BONES AND SOFT TISSUES:  Degenerative changes of the visualized spine, including multilevel  thoracic anterior/anterolateral osteophyte formation. No aggressive or  suspicious osseous/soft tissue lesion identified.       Impression    IMPRESSION:  1. No acute process within the lung bases, abdomen, or pelvis.  2. Stable postsurgical changes of liver transplantation.    I have personally reviewed the examination and initial interpretation  and I agree with the findings.    JUAN DAVID LOCKHART MD         SYSTEM ID:  [id number]   XR Chest Port 1 View    Narrative    EXAM: XR CHEST PORT 1 VIEW  LOCATION: [facility]  DATE: 8/10/2023    INDICATION: Weakness.  COMPARISON: 05/17/2023.      Impression    IMPRESSION: No focal airspace consolidation. No pleural effusion or pneumothorax.    Mild cardiomegaly. Median sternotomy and coronary  artery bypass grafting. Atherosclerosis of the thoracic aorta.

## 2023-08-11 NOTE — PROGRESS NOTES
HEMODIALYSIS TREATMENT NOTE    Date: 8/11/2023  Time: 01.45 AM    Data:  Pre Wt: 86.0kg    Desired Wt: 86.0 kg   Post Wt:  86.0 kg (Estimated)   Weight change:  0 kg  Ultrafiltration - Post Run Net Total Removed (mL):  No UF  Vascular Access Status: CVC  patent  Dialyzer Rinse:  Streaked. Light  Total Blood Volume Processed:28.8 L    Total Dialysis (Treatment) Time:  2.0 hours   Dialysate Bath: K 2, Ca 2.5  Heparin: None    Lab:   Yes : Hep B Antibody & Antigen.    Interventions:  Pt had emergency 2.0 hours HD due to potassium of 7.7.  Right internal jugular was inserted at ICU bedside with okay to use from X -ray report.  Monitoring every 15 minutes & PRN.  No dialysis related medication given during HD.  SBP soft but stable. SBP > 100 throughout the run. No issues.  Pt completed his treatment time well, 0 UF per prescription, Crit-line steady with A profile at the end of HD.  Post HD , CVC saline locked, clear guard applied & handoff report given.    Assessment:  Pt alert & oriented x4, denies pain & no sign of shortness of breath.  Internal jugular dressing dry, clean & intact       Plan:    Per Renal Team

## 2023-08-11 NOTE — PROGRESS NOTES
[facility]    ICU Progress Note       Date of Admission:  8/10/2023    Assessment: Critical Care   Frandy Workman is a 58 year old male with a history of CAD s/p CABG in 2021, DMII, CKD, and cirrhosis with HCC now s/p liver transplant in 2019 who has since developed new peritoneal metastases and was started on Sorafenib on 7/29/23. He was admitted to the ICU on 8/10/2023 for emergent dialysis for hyperkalemia (8) with EKG changes 2/2 JERONIMO on CKD stage III. K now improved after shifting in the ED and one round of HD. Remains relatively hypotensive compared to baseline HTN, but is stable and off pressors so will transfer to medicine.        Plan: Critical Care         Neuro:  # Pain and sedation  Patient is awake and conversant. Denies any significant pain currently.   - RASS goal 0/-1  - PRN tylenol     Pulmonary:  # Viral URI  Patient's caretaker had a URI recently. Productive cough for about 5 days. No fevers or leukocytosis currently. No consolidation on CXR. Viral panel positive for Rhinovirus. Received vanc + zosyn in ED.  - blood cultures from ED NGTD     Cardiovascular:  # CAD s/p 2 vessel CABG to LAD and OM  # HTN  # HLD  S/p CABG 7/2021. On metoprolol 12.5 mg daily, rosuvastatin 20 mg daily, and lisinopril 10 mg. Appears volume down on exam with dry mucus membranes and tachycardia with recent low PO intake.  - hold metoprolol and lisinopril due due to volume status and renal function. Resume once stable  - MAP goal >65  - consider new BP agent upon discharge     # Elevated troponin  Troponin elevated to 66 on admission, down to 46 on recheck. No ST segment changes on EKG, suspect a type 2 demand ischemia vs decreased renal clearance.  - continue to monitor     GI/Nutrition:  # ESTRADA cirrhosis c/b portal vein thrombosis  # s/p Liver transplant 11/2019  # HCC s/p TACE 1/2019, now recurrent w/ mets to peritoneum recently initiated on Sorafenib  # HBcAg +ve donor  #  Immunosuppressed  # Hx of Esophageal varices  He had a liver transplant in 2019 for HCC and cirrhosis. In June 2023, found to have new biopsy proven peritoneal metastases of HCC. Started on oral chemotherapy (sorafenib 400mg BID) on 7/29/23, held by oncology due to vomiting and diarrhea. Immunosuppression with prednisone and mycophenolate. LFTs on admission wnl. CT abdomen with stable post-surgical changes of transplant liver.  - Monitor MELD labs  - Discussed w/ GI overnight on admission, formal consult to transplant hepatology placed on 8/11  - Resume mycophenolate 500mg BID  - Continue prednisone 5 mg  - Hold Lamivudine 100mg for now, pharmacy re-dose for kidney impairment tomorrow     # Elevated lipase  Lipase elevated to 797. Mild tenderness on exam with known peritoneal metastases and recent laparoscopic abdominal exploration. No apparent fat stranding on abdominal CT per our read, no concerns per discussion with radiology.   - monitor clinically     # Nausea/Vomiting  # Diarrhea  # Anorexia  # Nutrition  Reports these symptoms resolved multiple days prior to admission after starting Zofran. Had 4 days of N/V/D. Suspect 2/2 to chemotherapy, post nasal drip from viral URI, and/or viral gastroenteritis. Patient has not been maintaining adequate hydration for the past few days. Did get 1L LR in ED.   - Zofran prn  - renal diet     Renal/Fluids/Electrolytes:  # Hyperkalemia  Potassium 8 on admission. EKG with peaked T waves, Qtc 422. S/p calcium gluconate, insulin/dextrose, and now one round of HD clearance. Likely 2/2 renal failiure and  metabolic acidosis. EKG upon arrival in the ICU with resolution of peaked T waves.  - Nephrology consulted, appreciate recs  - Dialysis line placed  - dialysis again 8/11 per nephrology     # Hyperchloremic non-anion gap metabolic acidosis  # Anion gap metabolic acidosis  VBG with pH 7.07, bicarb 9, and anion gap 18 on admission. Delta delta ratio was 0.4. Likely a combination of  anion gap and non-anion gap metabolic acidosis. Anion gap acidosis could be due to uremia with renal failure or starvation ketoacidosis as he was not eating well for multiple days PTA. LA was normal. NAGMA likely 2/2 bicarb losses due to recent diarrhea, type 2 RTA less likely given he has hyperkalemia  - Bicarb drip off  - beta-hydroxybutyrate     # JERONIMO on CKD stage III, intrarenal   # Suspect ATN  # Diabetic nephropathy  Baseline Cr at 2, elevated to 7.38 on admission now down to 4.29 after 1L crystalloid administration in the ED HD clearance run overnight. BUN:Cr ratio around 15 with significant UOP despite being clinically dry post HD run indicating likely intrarenal ATN picture. Prerenal also a potential contributing factor due to significantly reduced PO intake and n/v/d. PTA lisinopril likely not helping the situation as well.  - Renal consulted, further HD as per nephrology  - Strict I/Os     Endocrine:  # DMII  Last A1c 6.3. Well-controlled with outpatient regimen of basal insulin 34 units, carb count insulin, metformin, and empagliflozin. Did receive insulin bolus w/ dextrose for hyperkalemia. Sugars afterwards in the 80s.  - Hold PTA diabetes meds  - Basal insulin 15 units daily  - LDSS     ID:  No acute problems     Hematology:    # Thrombocytopenia  PLT down to 106k from 178k on the day of admission.  - ordered bilateral upper and lower extremity doppler US to assess for HIT: no evidence of DVT  - consider repeat CBC afternoon    # Chronic anemia, macrocytic  Baseline hgb 11, stable here. Mixed picture with macrocytic and normocytic. Could be secondary to anemia of chronic disease vs medication adverse effects vs nutritional deficiencies. Does receive EPO per renal notes.  - Monitor hgb, transfuse <7     Musculoskeletal:  No acute problems     Skin:  # Erythematous macular rash  Erythematous macular rash on the neck and chest. Patient states this developed today. Does not appear to be urticaria. It  "could be an adverse effect of sorafenib.  - monitor     Psych:  # Bipolar disorder  Was previously on lithium, but no recent PTA meds.    Lines/ tubes/ drains:  Carroll Catheter: Not present  Lines: PRESENT      CVC Double Lumen Right Internal jugular-Site Assessment: WDL except;Tender;Ecchymotic      Prophylaxis:  - DVT Prophylaxis: Heparin SQ  - PUD Prophylaxis: oral PPI    Code Status: Full Code      Disposition:  - transfer to medicine today    The patient's care was discussed with the Attending Physician, Dr. Gaviria and Chief Resident/Fellow.      Resident/Fellow Attestation   I, Perry Powell MD, was present with the medical/SERA student who participated in the service and in the documentation of the note.  I have verified the history and personally performed the physical exam and medical decision making.  I agree with the assessment and plan of care as documented in the note.        Perry Powell MD  PGY1  Date of Service (when I saw the patient): 08/11/23     Dexter Soares, MS4  [facility]  Securely message with Vocera (more info)  Text page via [employer] Paging/Directory     Clinically Significant Risk Factors Present on Admission        # Hyperkalemia: Highest K = 8 mmol/L in last 2 days, will monitor as appropriate   # Hypocalcemia: Lowest Ca = 7.9 mg/dL in last 2 days, will monitor and replace as appropriate   # Hypomagnesemia: Lowest Mg = 1.6 mg/dL in last 2 days, will replace as needed   # Hypoalbuminemia: Lowest albumin = 3.4 g/dL at 8/10/2023  9:51 PM, will monitor as appropriate  # Coagulation Defect: INR = 1.16 (Ref range: 0.85 - 1.15) and/or PTT = N/A, will monitor for bleeding  # Drug Induced Platelet Defect: home medication list includes an antiplatelet medication   # Hypertension: Noted on problem list      # Overweight: Estimated body mass index is 27.71 kg/m  as calculated from the following:    Height as of this encounter: 1.753 m (5' 9\").   "  Weight as of this encounter: 85.1 kg (187 lb 9.8 oz).         # Anemia: based on hgb <11         ______________________________________________________________________    Interval History   Nursing notes reviewed.  Received hemodialysis overnight for hyperkalemia and JEROINMO.  Patient feeling much better this morning with less tremors and myalgias.  No chest pain or shortness of breath.  Had large spontaneous urinary voiding this morning and is not feeling nauseous.  Has not had a bowel movement in multiple days after having significant amount of diarrhea.    Physical Exam   Vital Signs: Temp: 98.3  F (36.8  C) Temp src: Oral BP: (!) 134/96 Pulse: 112   Resp: 20 SpO2: 100 % O2 Device: None (Room air)    Weight: 187 lbs 9.78 oz    General: Awake, laying in bed. No apparent distress  HEENT: subconjunctival hemorrhage in right eye, dry MM with aphthous ulcer on the hard palate  Neuro: A&Ox3, arm tremors have improved since previous exam, answers questions appropriately  Pulm/Resp: Nonlabored breathing, good air entry bilaterally. Clear breath sounds bilaterally without rhonchi, crackles or wheeze, breathing non-labored  CV: tachycardic, regular rhythm, no murmurs appreciated on auscultation, radial symmetric  Abdomen: Soft, mildly distended, mild tenderness to palpation in the LUQ. Hypoactive bowel sounds  Incisions/Skin: erythematous macular rash on neck extending midline chest. Slightly improved from previous exam. no rash on palms or soles.  Extremities: No lower extremity edema    Complete Blood Count   Recent Labs   Lab 08/11/23  0640 08/10/23  2151 08/10/23  1844 08/10/23  1833 08/10/23  1024   WBC 4.8 5.2  --  8.8 10.3   HGB 10.0* 10.3* 11.9* 11.9* 13.3   * 120*  --  164 178       Basic Metabolic Panel  Recent Labs   Lab 08/11/23  0852 08/11/23  0640 08/11/23  0500 08/11/23  0209 08/11/23  0047 08/10/23  2244 08/10/23  2159 08/10/23  2151 08/10/23  2018 08/10/23  1844 08/10/23  1833 08/10/23  1024   NA  --   "142  --   --   --   --   --  141  --  139 138 139   POTASSIUM 4.4 4.1  4.1  --  3.9  --   --   --  5.9*  --  7.7* 8.0* 6.3*   CHLORIDE  --  109*  --   --   --   --   --  116*  --   --  112* 112*   CO2  --  16*  --   --   --   --   --  9*  --   --  8* 9*   BUN  --  62.0*  --   --   --   --   --  105.0*  --   --  117.0* 107.0*   CR  --  4.29*  --   --   --   --   --  6.77*  --   --  7.38* 6.50*   GLC  --  119* 107*  --  122* 86   < > 113*   < > 122* 130* 180*    < > = values in this interval not displayed.       Liver Function Tests  Recent Labs   Lab 08/10/23  2151 08/10/23  1833 08/10/23  1024   AST 25 22 24   ALT 16 26 27   ALKPHOS 84 108 110   BILITOTAL 0.2 0.3 0.4   ALBUMIN 3.4* 4.3 4.4   INR  --  1.16*  --        Pancreatic Enzymes  Recent Labs   Lab 08/10/23  1833   LIPASE 797*       Coagulation Profile  Recent Labs   Lab 08/10/23  1833   INR 1.16*       IMAGING:  Recent Results (from the past 24 hour(s))   CT Abdomen Pelvis w/o Contrast    Addendum: 8/11/2023    There is a typographical omission in the original dictation. The  section titled pancreas in the body of the report should read as  follows:  \"Unenhanced appearance of the pancreas within normal limits with no  ductal dilatation.\"  The remainder of the exam is as dictated.    JUAN DAVID LOCKHART MD         SYSTEM ID:  [id number]        Narrative    EXAM: CT ABDOMEN PELVIS W/O CONTRAST  8/10/2023 6:14 PM     HISTORY:  abdominal pain       COMPARISON:  X-ray 7/28/2023    TECHNIQUE: CT abdomen and pelvis noncontrast; axial slices with  sagittal and coronal reconstructions. Total DLP: 410 mGy*cm.    FINDINGS:  LUNG BASES:  Mild basilar scattered streaky atelectasis. Otherwise clear lung  bases.    Lower mediastinum: Intact-appearing lower median sternotomy wires.    ABDOMEN:  Liver: Post surgical changes of the liver transplantation.    Gallbladder: Surgically absent.    Pancreas:     Spleen: Within normal limits.    Adrenal glands: No focal " nodule/mass.    Kidneys: No suspicious renal lesion/mass. No hydronephrosis. No renal  or urinary collecting system stones. The bladder is partially  decompressed but grossly unremarkable. Mild nonspecific unchanged  perinephric stranding.    Bowel: Normal caliber of the large and small bowel with normal  appendix. Stable small fat-containing lower thoracic/epigastric  ventral hernia (series 5 image 32). Unchanged small fat-containing  umbilical hernia.    PELVIS:  No significant pelvic free fluid. Pelvic phleboliths.    VASCULATURE AND LYMPH NODES:  Mild/moderate aortoiliac atherosclerosis calcification. No  intraperitoneal or retroperitoneal lymphadenopathy.    BONES AND SOFT TISSUES:  Degenerative changes of the visualized spine, including multilevel  thoracic anterior/anterolateral osteophyte formation. No aggressive or  suspicious osseous/soft tissue lesion identified.       Impression    IMPRESSION:  1. No acute process within the lung bases, abdomen, or pelvis.  2. Stable postsurgical changes of liver transplantation.    I have personally reviewed the examination and initial interpretation  and I agree with the findings.    JUAN DAVID LOCKHART MD         SYSTEM ID:  [id number]   XR Chest Port 1 View    Narrative    EXAM: XR CHEST PORT 1 VIEW  LOCATION: [facility]  DATE: 8/10/2023    INDICATION: Weakness.  COMPARISON: 05/17/2023.      Impression    IMPRESSION: No focal airspace consolidation. No pleural effusion or pneumothorax.    Mild cardiomegaly. Median sternotomy and coronary artery bypass grafting. Atherosclerosis of the thoracic aorta.   XR Chest Port 1 View    Narrative    EXAM:  XR CHEST PORT 1 VIEW    INDICATION: s/p right HD catheter placement    COMPARISON:  Chest x-ray 10/20/2023 at 2002    FINDINGS:  Single AP view of the chest. Sternotomy wires. Right IJ central venous  catheter terminates over the SVC. Mediastinal clips. Epigastric  surgical  clips.    Cardiomediastinal silhouette within normal limits.  Linear opacities  at the right lung base.  No pneumothorax.  No pleural effusion.       Impression    IMPRESSION:  1.  Right IJ central venous catheter terminates over the SVC.  2.  Linear opacities at the right lung base suggests atelectasis.    I have personally reviewed the examination and initial interpretation  and I agree with the findings.    DINO LAWS MD         SYSTEM ID:  [id number]   US Upper Ext Venous Duplex Limited Bilat    Narrative    Exam: Duplex Doppler ultrasound assessment of the upper extremities  veins bilaterally 8/11/2023 10:00 AM    Clinical information: Metastatic HCC (s/p transplant); r/o DVT    Ordering provider: Perry Powell MD    Comparison:     Technique: B-mode (grayscale) and duplex Doppler ultrasound of the  upper extremity veins, including compressibility when feasible.  Velocity measurements obtained with angle correct at or less than 60  degrees.     Findings:     Right upper extremity:    Internal jugular vein: Thrombus: No, Phasic: Yes    Innominate vein: Thrombus: No, Phasic: Yes    Subclavian vein proximal: Thrombus: No, Phasic: Yes  Subclavian vein mid: Thrombus: No, Phasic: Yes    Axillary vein: Thrombus: No, Phasic: Yes    Right upper extremity veins demonstrate normal compressibility,  phasicity, and pulsatility.      Left upper extremity:    Internal jugular vein: Thrombus: No, Phasic: Yes    Innominate vein: Thrombus: No, Phasic: Yes    Subclavian vein proximal: Thrombus: No, Phasic: Yes  Subclavian vein mid: Thrombus: No, Phasic: Yes    Axillary vein: Thrombus: No, Phasic: Yes    Left upper extremity veins demonstrate normal compressibility,  phasicity, and pulsatility.          Impression    Impression:    1. Right upper extremity: No deep vein thrombosis.    2. Left upper extremity: No deep vein thrombosis.    I have personally reviewed the examination and initial interpretation  and I agree with  "the findings.    MIGUEL ANGEL TURPIN MD         SYSTEM ID:  [id number]   US Lower Extremity Venous Duplex Bilateral    Narrative    Exam: Ultrasound of the deep venous system of bilateral legs dated  8/11/2023 10:03 AM    Clinical information: Metastatic HCC (s/p transplant); r/o DVT    Comparison: Ultrasound 7/13/2021    Ordering provider: Perry Powell MD    Technique: Moon-scale evaluation with compression and Doppler  assessment of deep venous system for spontaneous and phasic flow, as  well as the presence of distal augmentation. Color flow images  obtained as needed. Gray-scale images with compression of the great  saphenous vein obtained as needed.    Findings:    Right leg:    CFV: Thrombus: No, Phasic: Yes  Femoral vein, proximal: Thrombus: No, Phasic: Yes  Femoral vein, mid: Thrombus: No, Phasic: Yes  Femoral vein, distal: Thrombus: No, Phasic: Yes  Popliteal vein: Thrombus: No, Phasic: Yes  PTV: Thrombus: No  Peroneal vein: Not visualized.     Left leg:    CFV: Thrombus: No, Phasic: Yes  Femoral vein, proximal: Thrombus: No, Phasic: Yes  Femoral vein, mid: Thrombus: No, Phasic: Yes  Femoral vein, distal: Thrombus: No, Phasic: Yes  Popliteal vein: Thrombus: No, Phasic: Yes  PTV: Thrombus: Thrombus: No  Peroneal vein: Not visualized.      Impression    Impression:    Right leg: No deep venous thrombosis. The peroneal vein was not  visualized.    Left leg: No deep venous thrombosis. The peroneal vein was not  visualized.    Reference: \"Duplex Ultrasound in the Diagnosis of Lower-Extremity Deep  Venous Thrombosis\"- Charlene Esteban MD, S; Julian Zhou MD  (Circulation. 2014;129:917-921. http://circ.ahajournals.org )    I have personally reviewed the examination and initial interpretation  and I agree with the findings.    MIGUEL ANGEL TURPIN MD         SYSTEM ID:  [id number]       "

## 2023-08-11 NOTE — PROGRESS NOTES
"SPIRITUAL HEALTH SERVICES  SPIRITUAL ASSESSMENT Progress Note  [facility] 4C     REFERRAL SOURCE: Admission Request    Brief supportive visit with pt Frandy Workman.  He shared that he is \"doing well,\" and is going to be transferring out of the ICU.  Miller said that he does not have family support (he is estranged from his daughter and his siblings) but does have good friends, one of whom was present at time of visit.  Miller is [employer] and is looking forward to reconnecting with [employer].   provided blessing and will continue to visit as needed.    PLAN: [facility] will remain available     Margaret Roland    Pager: [phone]    "

## 2023-08-12 LAB
ANION GAP SERPL CALCULATED.3IONS-SCNC: 19 MMOL/L (ref 7–15)
ANION GAP SERPL CALCULATED.3IONS-SCNC: 20 MMOL/L (ref 7–15)
BUN SERPL-MCNC: 63.6 MG/DL (ref 8–23)
BUN SERPL-MCNC: 65.8 MG/DL (ref 8–23)
CALCIUM SERPL-MCNC: 8.4 MG/DL (ref 8.6–10)
CALCIUM SERPL-MCNC: 8.5 MG/DL (ref 8.6–10)
CHLORIDE SERPL-SCNC: 105 MMOL/L (ref 98–107)
CHLORIDE SERPL-SCNC: 111 MMOL/L (ref 98–107)
CREAT SERPL-MCNC: 3.27 MG/DL (ref 0.67–1.17)
CREAT SERPL-MCNC: 3.78 MG/DL (ref 0.67–1.17)
DEPRECATED HCO3 PLAS-SCNC: 16 MMOL/L (ref 22–29)
DEPRECATED HCO3 PLAS-SCNC: 17 MMOL/L (ref 22–29)
ERYTHROCYTE [DISTWIDTH] IN BLOOD BY AUTOMATED COUNT: 13.6 % (ref 10–15)
GFR SERPL CREATININE-BSD FRML MDRD: 18 ML/MIN/1.73M2
GFR SERPL CREATININE-BSD FRML MDRD: 21 ML/MIN/1.73M2
GLUCOSE BLDC GLUCOMTR-MCNC: 140 MG/DL (ref 70–99)
GLUCOSE BLDC GLUCOMTR-MCNC: 152 MG/DL (ref 70–99)
GLUCOSE BLDC GLUCOMTR-MCNC: 186 MG/DL (ref 70–99)
GLUCOSE BLDC GLUCOMTR-MCNC: 68 MG/DL (ref 70–99)
GLUCOSE BLDC GLUCOMTR-MCNC: 89 MG/DL (ref 70–99)
GLUCOSE SERPL-MCNC: 114 MG/DL (ref 70–99)
GLUCOSE SERPL-MCNC: 178 MG/DL (ref 70–99)
HCT VFR BLD AUTO: 37.7 % (ref 40–53)
HGB BLD-MCNC: 12 G/DL (ref 13.3–17.7)
MAGNESIUM SERPL-MCNC: 2 MG/DL (ref 1.7–2.3)
MCH RBC QN AUTO: 31.5 PG (ref 26.5–33)
MCHC RBC AUTO-ENTMCNC: 31.8 G/DL (ref 31.5–36.5)
MCV RBC AUTO: 99 FL (ref 78–100)
PLATELET # BLD AUTO: 126 10E3/UL (ref 150–450)
POTASSIUM SERPL-SCNC: 5.2 MMOL/L (ref 3.4–5.3)
POTASSIUM SERPL-SCNC: 5.2 MMOL/L (ref 3.4–5.3)
POTASSIUM SERPL-SCNC: 5.4 MMOL/L (ref 3.4–5.3)
RBC # BLD AUTO: 3.81 10E6/UL (ref 4.4–5.9)
SODIUM SERPL-SCNC: 141 MMOL/L (ref 136–145)
SODIUM SERPL-SCNC: 147 MMOL/L (ref 136–145)
WBC # BLD AUTO: 4.8 10E3/UL (ref 4–11)

## 2023-08-12 PROCEDURE — 36415 COLL VENOUS BLD VENIPUNCTURE: CPT

## 2023-08-12 PROCEDURE — 250N000013 HC RX MED GY IP 250 OP 250 PS 637

## 2023-08-12 PROCEDURE — 80048 BASIC METABOLIC PNL TOTAL CA: CPT

## 2023-08-12 PROCEDURE — 99233 SBSQ HOSP IP/OBS HIGH 50: CPT | Performed by: STUDENT IN AN ORGANIZED HEALTH CARE EDUCATION/TRAINING PROGRAM

## 2023-08-12 PROCEDURE — 250N000011 HC RX IP 250 OP 636

## 2023-08-12 PROCEDURE — 120N000002 HC R&B MED SURG/OB UMMC

## 2023-08-12 PROCEDURE — 99233 SBSQ HOSP IP/OBS HIGH 50: CPT | Performed by: INTERNAL MEDICINE

## 2023-08-12 PROCEDURE — 85027 COMPLETE CBC AUTOMATED: CPT

## 2023-08-12 PROCEDURE — 250N000013 HC RX MED GY IP 250 OP 250 PS 637: Performed by: STUDENT IN AN ORGANIZED HEALTH CARE EDUCATION/TRAINING PROGRAM

## 2023-08-12 PROCEDURE — 83735 ASSAY OF MAGNESIUM: CPT

## 2023-08-12 PROCEDURE — 36415 COLL VENOUS BLD VENIPUNCTURE: CPT | Performed by: STUDENT IN AN ORGANIZED HEALTH CARE EDUCATION/TRAINING PROGRAM

## 2023-08-12 PROCEDURE — 84132 ASSAY OF SERUM POTASSIUM: CPT | Performed by: STUDENT IN AN ORGANIZED HEALTH CARE EDUCATION/TRAINING PROGRAM

## 2023-08-12 PROCEDURE — 250N000012 HC RX MED GY IP 250 OP 636 PS 637

## 2023-08-12 RX ORDER — MAGNESIUM OXIDE 400 MG/1
400 TABLET ORAL EVERY 4 HOURS
Status: COMPLETED | OUTPATIENT
Start: 2023-08-12 | End: 2023-08-12

## 2023-08-12 RX ADMIN — MYCOPHENOLATE MOFETIL 500 MG: 250 CAPSULE ORAL at 08:44

## 2023-08-12 RX ADMIN — Medication 50 MCG: at 15:48

## 2023-08-12 RX ADMIN — TAMSULOSIN HYDROCHLORIDE 0.4 MG: 0.4 CAPSULE ORAL at 07:47

## 2023-08-12 RX ADMIN — ONDANSETRON 4 MG: 4 TABLET, ORALLY DISINTEGRATING ORAL at 07:42

## 2023-08-12 RX ADMIN — HEPARIN SODIUM 5000 UNITS: 5000 INJECTION, SOLUTION INTRAVENOUS; SUBCUTANEOUS at 00:10

## 2023-08-12 RX ADMIN — MAGNESIUM OXIDE TAB 400 MG (241.3 MG ELEMENTAL MG) 400 MG: 400 (241.3 MG) TAB at 00:10

## 2023-08-12 RX ADMIN — PREDNISONE 5 MG: 5 TABLET ORAL at 07:47

## 2023-08-12 RX ADMIN — LAMIVUDINE 25 MG: 10 SOLUTION ORAL at 07:48

## 2023-08-12 RX ADMIN — MAGNESIUM OXIDE TAB 400 MG (241.3 MG ELEMENTAL MG) 400 MG: 400 (241.3 MG) TAB at 17:21

## 2023-08-12 RX ADMIN — INSULIN DEGLUDEC INJECTION 15 UNITS: 100 INJECTION, SOLUTION SUBCUTANEOUS at 21:52

## 2023-08-12 RX ADMIN — MAGNESIUM OXIDE TAB 400 MG (241.3 MG ELEMENTAL MG) 400 MG: 400 (241.3 MG) TAB at 11:34

## 2023-08-12 RX ADMIN — ACETAMINOPHEN 650 MG: 325 TABLET, FILM COATED ORAL at 14:20

## 2023-08-12 RX ADMIN — PANTOPRAZOLE SODIUM 40 MG: 40 TABLET, DELAYED RELEASE ORAL at 07:48

## 2023-08-12 RX ADMIN — MYCOPHENOLATE MOFETIL 500 MG: 250 CAPSULE ORAL at 21:54

## 2023-08-12 RX ADMIN — PROCHLORPERAZINE MALEATE 5 MG: 5 TABLET ORAL at 11:43

## 2023-08-12 RX ADMIN — ONDANSETRON 4 MG: 4 TABLET, ORALLY DISINTEGRATING ORAL at 15:59

## 2023-08-12 RX ADMIN — HEPARIN SODIUM 5000 UNITS: 5000 INJECTION, SOLUTION INTRAVENOUS; SUBCUTANEOUS at 15:48

## 2023-08-12 RX ADMIN — HEPARIN SODIUM 5000 UNITS: 5000 INJECTION, SOLUTION INTRAVENOUS; SUBCUTANEOUS at 07:48

## 2023-08-12 RX ADMIN — HEPARIN SODIUM 5000 UNITS: 5000 INJECTION, SOLUTION INTRAVENOUS; SUBCUTANEOUS at 22:30

## 2023-08-12 RX ADMIN — ASPIRIN 81 MG: 81 TABLET, COATED ORAL at 07:47

## 2023-08-12 ASSESSMENT — ACTIVITIES OF DAILY LIVING (ADL)
ADLS_ACUITY_SCORE: 25

## 2023-08-12 NOTE — PLAN OF CARE
End of shift Summary: See flowsheet for VS and full assessment.    Pt complain of chills with temp of 99 . Tylenol given 1x for temp. Pt is A/Ox4. SR/ST HR 80-110s. BP normal. LS CTA. Poor appetite. Renal diet. Voided; Good UO. BM+. Complains of nausea. PRN compazine given. 4x PIVs. SBA    Plan: active transfer order to [facility].     Goal Outcome Evaluation:      Plan of Care Reviewed With: patient    Overall Patient Progress: improvingOverall Patient Progress: improving

## 2023-08-12 NOTE — PLAN OF CARE
End of shift Summary: See flowsheet for VS and full assessment.   VSS on RA. Afebrile. Reports some pain in neck d/t RIJ and also some posterior back pain near kidneys. Tylenol x1. Preemptive zofran and compazine given. Tolerated light bkfst and lunch, felt nauseated with dinner. Diarhea and unmeasured urine x1. Ambulating in hallways. Last K check 5.2. Na 147. Recheck labs at 1800. Per patient possible removal of dialysis line tomorrow.     Plan: active transfer order to [facility].      Goal Outcome Evaluation:       Plan of Care Reviewed With: patient     Overall Patient Progress: improvingOverall Patient Progress: improving

## 2023-08-12 NOTE — PROGRESS NOTES
[facility]    Medicine Progress Note - Medicine Service, YURIDIA TEAM 4    Date of Admission:  8/10/2023    Assessment & Plan   Frandy Workman is a 58 year old male admitted on 8/10/2023 who is being transferred out of the ICU on 8/11/2023. He has a history of CAD s/p CABG in 2021, DMII, CKD, and cirrhosis with HCC now s/p liver transplant in 2019 who has since developed new peritoneal metastases and was started on Sorafenib on 7/29/23. He was admitted to the ICU on 8/10/2023 for emergent dialysis for hyperkalemia (8) with EKG changes 2/2 JERONIMO on CKD stage III. K now improved after shifting in the ED and one round of HD.     Active issues:   # Hyperkalemia, improved  # Mild hypernatremia  Potassium 8 on admission. EKG with peaked T waves, Qtc 422. S/p calcium gluconate, insulin/dextrose, and now one round of HD clearance. Likely 2/2 renal failiure and metabolic acidosis. EKG upon arrival in the ICU with resolution of peaked T waves.  Mild hypernatremia 8/12, discussed increasing p.o. fluid intake with patient.  Will repeat sodium at 1800 and if continues to be increasing then will get urine sodium, start D5W, and recheck in 4 hours.  - Nephrology consulted, appreciate recs  - Dialysis line placed  - Monitor on labs  - Follow up 1800 BMP     # Hyperchloremic non-anion gap metabolic acidosis; resolved  # Anion gap metabolic acidosis  VBG with pH 7.07, bicarb 9, and anion gap 18 on admission. Delta delta ratio was 0.4. Likely a combination of anion gap and non-anion gap metabolic acidosis. Anion gap acidosis could be due to uremia with renal failure or starvation ketoacidosis as he was not eating well for multiple days PTA. LA was normal. NAGMA likely 2/2 bicarb losses due to recent diarrhea, type 2 RTA less likely given he has hyperkalemia. Elevated beta-hydroxybutyrate  - Bicarb drip off  - Monitor     # JERONIMO on CKD stage III, intrarenal   # Suspect ATN  # Diabetic  nephropathy  Baseline Cr at 2, elevated to 7.38 on admission now down to 4.29 after 1L crystalloid administration in the ED HD clearance run overnight. BUN:Cr ratio around 15 with significant UOP despite being clinically dry post HD run indicating likely intrarenal ATN picture. Prerenal also a potential contributing factor due to significantly reduced PO intake and n/v/d. PTA lisinopril likely not helping the situation as well.  - Renal consulted, further HD as per nephrology  - Strict I/Os    # Hypocalcemia  # Hypomagnesemia  - Replace as needed    # ESTRADA cirrhosis c/b portal vein thrombosis  # s/p Liver transplant 11/2019  # HCC s/p TACE 1/2019, now recurrent w/ mets to peritoneum recently initiated on Sorafenib  # HBcAg +ve donor  # Immunosuppressed  # Hx of Esophageal varices  He had a liver transplant in 2019 for HCC and cirrhosis. In June 2023, found to have new biopsy proven peritoneal metastases of HCC. Started on oral chemotherapy (sorafenib 400mg BID) on 7/29/23, held by oncology due to vomiting and diarrhea. Immunosuppression with prednisone and mycophenolate. LFTs on admission wnl. CT abdomen with stable post-surgical changes of transplant liver.  - Monitor MELD labs  - Resumed mycophenolate 500mg BID  - Continue prednisone 5 mg  - Lamivudine 25 mg down from 100mg      # Nausea/Vomiting  # Diarrhea  # Anorexia  # Nutrition  Reports these symptoms resolved multiple days prior to admission after starting Zofran. Had 4 days of N/V/D. Suspect 2/2 to chemotherapy, post nasal drip from viral URI, and/or viral gastroenteritis. Patient has not been maintaining adequate hydration for the past few days. Did get 1L LR in ED.   - Zofran prn  - renal diet    # Viral URI  Patient's caretaker had a URI recently. Productive cough for about 5 days. No fevers or leukocytosis currently. No consolidation on CXR. Viral panel positive for Rhinovirus. Received vanc + zosyn in ED.  - blood cultures from ED NGTD    # CAD s/p 2  vessel CABG to LAD and OM  # HTN  # HLD  S/p CABG 7/2021. On metoprolol 12.5 mg daily, rosuvastatin 20 mg daily, and lisinopril 10 mg. Appears volume down on exam with dry mucus membranes and tachycardia with recent low PO intake.  - hold metoprolol and lisinopril due due to volume status and renal function. Resume once stable  - MAP goal >65  - consider new BP agent upon discharge     # Elevated troponin  Troponin elevated to 66 on admission, down to 46 on recheck. No ST segment changes on EKG, suspect a type 2 demand ischemia vs decreased renal clearance.  - continue to monitor    # DMII  Last A1c 6.3. Well-controlled with outpatient regimen of basal insulin 34 units, carb count insulin, metformin, and empagliflozin. Did receive insulin bolus w/ dextrose for hyperkalemia. Sugars afterwards in the 80s.  - Hold PTA diabetes meds  - Basal insulin 15 units daily  - MDSS    # Thrombocytopenia, acute, improving  PLT down to 106k from 178k on the day of admission. Likely dilutional. Ordered bilateral upper and lower extremity doppler US to assess for HIT: no evidence of DVT  - Monitor on CBC     # Chronic anemia, macrocytic  Baseline hgb 11, stable here. Mixed picture with macrocytic and normocytic. Could be secondary to anemia of chronic disease vs medication adverse effects vs nutritional deficiencies. Does receive EPO per renal notes.  - Monitor hgb, transfuse <7    # Erythematous macular rash  Erythematous macular rash on the neck and chest. Patient states this developed today. Does not appear to be urticaria. It could be an adverse effect of sorafenib.  - monitor    # Bipolar disorder  Was previously on lithium, but no recent PTA meds.    # Elevated lipase  Lipase elevated to 797. Mild tenderness on exam with known peritoneal metastases and recent laparoscopic abdominal exploration. No apparent fat stranding on abdominal CT per our read, no concerns per discussion with radiology.   - monitor clinically       Diet:  "Renal Diet (dialysis)  Snacks/Supplements Adult: Other; PRN per pt request; With Meals    DVT Prophylaxis: Heparin SQ  Carroll Catheter: Not present  Lines: PRESENT      CVC Double Lumen Right Internal jugular-Site Assessment: WDL except;Tender      Cardiac Monitoring: ACTIVE order. Indication: ICU  Code Status: Full Code      Clinically Significant Risk Factors        # Hyperkalemia: Highest K = 8 mmol/L in last 2 days, will monitor as appropriate  # Hypernatremia: Highest Na = 147 mmol/L in last 2 days, will monitor as appropriate  # Hypocalcemia: Lowest Ca = 7.9 mg/dL in last 2 days, will monitor and replace as appropriate   # Hypomagnesemia: Lowest Mg = 1.6 mg/dL in last 2 days, will replace as needed  # Anion Gap Metabolic Acidosis: Highest Anion Gap = 19 mmol/L in last 2 days, will monitor and treat as appropriate  # Hypoalbuminemia: Lowest albumin = 3.4 g/dL at 8/10/2023  9:51 PM, will monitor as appropriate  # Coagulation Defect: INR = 1.16 (Ref range: 0.85 - 1.15) and/or PTT = N/A, will monitor for bleeding  # Thrombocytopenia: Lowest platelets = 106 in last 2 days, will monitor for bleeding   # Hypertension: Noted on problem list        # Overweight: Estimated body mass index is 27.71 kg/m  as calculated from the following:    Height as of this encounter: 1.753 m (5' 9\").    Weight as of this encounter: 85.1 kg (187 lb 9.8 oz)., PRESENT ON ADMISSION  # Severe Malnutrition: based on nutrition assessment, PRESENT ON ADMISSION        Disposition Plan     Expected Discharge Date: 08/16/2023                  Farooq Schrader MD  Medicine Service, [facility] TEAM [address] [place] [address]  Securely message with [email or web address] (more info)  Text page via [facility] Paging/Directory   See signed in provider for up to date coverage information  ______________________________________________________________________    Interval History   Patient did well overnight.  No chest pain or shortness of breath.  " No abdominal pain.  Eating this morning.  He is looking forward to going for a walk today.  No fevers or chills.    Physical Exam   Vital Signs: Temp: 98  F (36.7  C) Temp src: Oral BP: 130/75 Pulse: 114   Resp: 20 SpO2: 100 % O2 Device: None (Room air)    Weight: 187 lbs 9.78 oz    General: Awake, sitting on side of bed.  NAD  HEENT: subconjunctival hemorrhage in right eye, dry MM with aphthous ulcer on the hard palate (stable)  Neuro: A&Ox3  Pulm/Resp: Nonlabored breathing, good air entry bilaterally. Clear breath sounds bilaterally without rhonchi, crackles or wheeze, breathing non-labored  CV: tachycardic, regular rhythm, no murmurs appreciated on auscultation,  Abdomen: Soft, mildly distended, no tenderness to palpation. Hypoactive bowel sounds  Incisions/Skin: erythematous macular rash on neck extending midline chest (stable), no rash on palms or soles.  Extremities: No lower extremity edema    Medical Decision Making         Data     I have personally reviewed the following data over the past 24 hrs:    4.8  \   12.0 (L)   / 126 (L)     147 (H) 111 (H) 65.8 (H) /  89   5.4 (H) 17 (L) 3.78 (H) \     Trop: N/A BNP: N/A

## 2023-08-12 NOTE — PROGRESS NOTES
Nephrology Progress Note  08/12/2023      Miller Workman is a 58 year old man with a PMH of DMII, bipolar disorder, cirrhosis s/t ESTRADA with subsequent development of HCC, s/p liver transplant , HLD, HTN, GERD, BPH and CAD with hx of 2 vessel CABG in July 2021, CKD with anemia of chronic disease, on erythropoetin.  Recently he was diagnosed with metastatic lesions in the peritoneum for this he was started on sorafenib on 7/29/2023. He presented with more than a week of not feeling well. After starting sorafenib he subsequently had diarrhea for several days. In the past 3-4 days he has mostly had severe nausea and vomiting. At presentation to the ED 8/10 he was noted to have JERONIMO with severe acidosis and hyperklamemia    Interval History :   Yesterday, he urinated 2.7 L.  His weight was down to 85.126 the day before.  No weight check this morning.  Blood pressure this morning is 130/75.  Sodium this morning increased to 147 potassium 5.4 and bicarb 17.  Patient still continues to complain of chills and has 1 time BM.  Today, the patient reported he feels better and started to be more than yesterday.  However his appetite is still poor and he does not like to drink water.  He further told me that he was recently started on chemotherapy Sorafenib 400 mg BID on 7/29/23 he did well 10 days after starting the medication but 4 to 5 days ago he started to have a lot of diarrhea nauseous and vomiting.  Conservative management did not improv while he was having diarrhea nauseous and vomiting, he continues to take lisinopril and Jardiance.e his symptoms.     Assessment & Recommendations:   # JERONIMO, stage 3 required IHD on 8/10/2 emergently due to hyperkalemia of 7.7 and metabolic acidosis  # CKD stage 3 likely prior JERONIMO in the past ; baseline Cr 1.8-24  # S/p Ltx in 2019 for liver cancer; started Sorafenib on 7/9/23; followed by Dr. Villarreal  Likely in the setting of diarrhea while on lisinopril and Jardiance. I doubt that his JERONIMO is  "from Sorafenib. Usually renal SE include hypertension and proteinuria and TMA.  - Please continue to hold Jardiance, lisinopril for now  - Discussed with him about the importance of holding lisinopril and Jardiance while he is sick  - Would not remove the line yet  # Hyperkalemia  Outpatient he is on lisinopril 10 mg daily potassium rebounds from 4.7-5.4 this morning.  Would recommend giving Lokelma 10 gm x1 dose and repeat K this afternoon.  # Mild Hypernatremia likely from post ATN diuresis  FWD is 0.6 L.  Increase free water intake.  Check serum sodium in the afternoon if sodium continue to increase, would start D5W at 50 ml/hr.   # Metabolic acidosis in the setting of severe acute kidney injury, diarrhea   Though on Metformin, this is not HERNAN. Urine ketone is negative.     Time spent: 50 minutes on this date of encounter for chart review, physical exam, medical decision making and co-ordination of care.    Recommendations were communicated to primary team via this note    Jhonny Evans MD MD, SATHYAN    Review of Systems:   All systems were reviewed. Pertinent positives and negatives are noted above    Physical Exam:   I/O last 3 completed shifts:  In: 1620 [P.O.:1620]  Out: 950 [Urine:950]   BP (!) 141/76 (BP Location: Right arm, Cuff Size: Adult Regular)   Pulse 100   Temp 98.1  F (36.7  C) (Oral)   Resp 14   Ht 1.753 m (5' 9\")   Wt 85.1 kg (187 lb 9.8 oz)   SpO2 100%   BMI 27.71 kg/m       Gen: NAD alert and oriented X 3, pleasant  Lungs: Clear at bases. No dullness to percussion  Card; regular but tachycardic.  Abd: good bowel sounds. Non tender  Ext: no LE edema  Access; R internal jugular catheter with no oozing   Neuro: no asterixis no tremor no myoclonus  Skin: no petechia no other rashes    Labs:   All labs reviewed by me  Electrolytes/Renal -   Recent Labs   Lab Test 08/13/23  0847 08/13/23  0814 08/12/23  2210 08/12/23  2150 08/12/23  1833 08/12/23  1719 08/12/23  1211 08/12/23  0746 " 08/12/23 0702 08/11/23  2154 08/11/23  1837 08/10/23  1833 08/10/23  1024 07/28/23  1046 06/14/23  0924 03/30/23  0905   NA  --  145  --   --  141  --   --   --  147*  --   --    < > 139 130*   < > 139   POTASSIUM  --  4.5  --   --  5.2  --  5.2  --  5.4*  --  4.7   < > 6.3* 5.5*   < > 4.5   CHLORIDE  --  110*  --   --  105  --   --   --  111*  --   --    < > 112* 103   < > 103   CO2  --  20*  --   --  16*  --   --   --  17*  --   --    < > 9* 17*   < > 26   BUN  --  55.8*  --   --  63.6*  --   --   --  65.8*  --   --    < > 107.0* 56.8*   < > 29.1*   CR  --  2.82*  --   --  3.27*  --   --   --  3.78*  --   --    < > 6.50* 2.46*   < > 1.80*   * 124* 186*   < > 178*   < >  --    < > 114*   < >  --    < > 180* 60*   < > 204*   DEBORAH  --  8.4*  --   --  8.5*  --   --   --  8.4*  --   --    < > 9.1 9.1   < > 9.2   MAG  --  1.9  --   --   --   --   --   --  2.0  --  1.8   < > 2.3 2.2  --   --    PHOS  --   --   --   --   --   --   --   --   --   --   --   --  6.0* 3.4  --  3.4    < > = values in this interval not displayed.       CBC -   Recent Labs   Lab Test 08/13/23  0814 08/12/23  0702 08/11/23  0640   WBC 4.5 4.8 4.8   HGB 11.8* 12.0* 10.0*   * 126* 106*       LFTs -   Recent Labs   Lab Test 08/10/23  2151 08/10/23  1833 08/10/23  1024   ALKPHOS 84 108 110   BILITOTAL 0.2 0.3 0.4   ALT 16 26 27   AST 25 22 24   PROTTOTAL 6.0* 7.3 7.6   ALBUMIN 3.4* 4.3 4.4       Iron Panel -   Recent Labs   Lab Test 04/25/22  0925 02/09/22  0850 11/16/21  1004   IRON 119 96 93   IRONSAT 42 39 44   QUAN 508* 1,046* 400*       Current Medications:   aspirin  81 mg Oral Daily    heparin ANTICOAGULANT  5,000 Units Subcutaneous Q8H    insulin aspart  1-7 Units Subcutaneous TID AC    insulin aspart  1-5 Units Subcutaneous At Bedtime    insulin degludec  15 Units Subcutaneous At Bedtime    lamiVUDine  25 mg Oral Daily    [Held by provider] lamiVUDine  100 mg Oral Daily    [Held by provider] metoprolol succinate ER  12.5 mg Oral  Daily    mycophenolate  500 mg Oral Q12H    pantoprazole  40 mg Oral QAM AC    predniSONE  5 mg Oral Daily    [Held by provider] rosuvastatin  20 mg Oral Daily    tamsulosin  0.4 mg Oral Daily    vitamin D3  50 mcg Oral Daily

## 2023-08-13 LAB
ANION GAP SERPL CALCULATED.3IONS-SCNC: 15 MMOL/L (ref 7–15)
ATRIAL RATE - MUSE: 94 BPM
BUN SERPL-MCNC: 55.8 MG/DL (ref 8–23)
CALCIUM SERPL-MCNC: 8.4 MG/DL (ref 8.6–10)
CHLORIDE SERPL-SCNC: 110 MMOL/L (ref 98–107)
CREAT SERPL-MCNC: 2.82 MG/DL (ref 0.67–1.17)
DEPRECATED HCO3 PLAS-SCNC: 20 MMOL/L (ref 22–29)
DIASTOLIC BLOOD PRESSURE - MUSE: NORMAL MMHG
ERYTHROCYTE [DISTWIDTH] IN BLOOD BY AUTOMATED COUNT: 13.4 % (ref 10–15)
GFR SERPL CREATININE-BSD FRML MDRD: 25 ML/MIN/1.73M2
GLUCOSE BLDC GLUCOMTR-MCNC: 118 MG/DL (ref 70–99)
GLUCOSE BLDC GLUCOMTR-MCNC: 155 MG/DL (ref 70–99)
GLUCOSE BLDC GLUCOMTR-MCNC: 164 MG/DL (ref 70–99)
GLUCOSE BLDC GLUCOMTR-MCNC: 176 MG/DL (ref 70–99)
GLUCOSE SERPL-MCNC: 124 MG/DL (ref 70–99)
HCT VFR BLD AUTO: 37.3 % (ref 40–53)
HGB BLD-MCNC: 11.8 G/DL (ref 13.3–17.7)
INTERPRETATION ECG - MUSE: NORMAL
MAGNESIUM SERPL-MCNC: 1.9 MG/DL (ref 1.7–2.3)
MCH RBC QN AUTO: 30.4 PG (ref 26.5–33)
MCHC RBC AUTO-ENTMCNC: 31.6 G/DL (ref 31.5–36.5)
MCV RBC AUTO: 96 FL (ref 78–100)
P AXIS - MUSE: 22 DEGREES
PLATELET # BLD AUTO: 110 10E3/UL (ref 150–450)
POTASSIUM SERPL-SCNC: 4.5 MMOL/L (ref 3.4–5.3)
POTASSIUM SERPL-SCNC: 5.6 MMOL/L (ref 3.4–5.3)
PR INTERVAL - MUSE: 142 MS
QRS DURATION - MUSE: 82 MS
QT - MUSE: 338 MS
QTC - MUSE: 422 MS
R AXIS - MUSE: 18 DEGREES
RBC # BLD AUTO: 3.88 10E6/UL (ref 4.4–5.9)
SODIUM SERPL-SCNC: 145 MMOL/L (ref 136–145)
SYSTOLIC BLOOD PRESSURE - MUSE: NORMAL MMHG
T AXIS - MUSE: 45 DEGREES
VENTRICULAR RATE- MUSE: 94 BPM
WBC # BLD AUTO: 4.5 10E3/UL (ref 4–11)

## 2023-08-13 PROCEDURE — 99233 SBSQ HOSP IP/OBS HIGH 50: CPT | Performed by: INTERNAL MEDICINE

## 2023-08-13 PROCEDURE — 250N000013 HC RX MED GY IP 250 OP 250 PS 637

## 2023-08-13 PROCEDURE — 80048 BASIC METABOLIC PNL TOTAL CA: CPT

## 2023-08-13 PROCEDURE — 85027 COMPLETE CBC AUTOMATED: CPT

## 2023-08-13 PROCEDURE — 120N000011 HC R&B TRANSPLANT UMMC

## 2023-08-13 PROCEDURE — 36415 COLL VENOUS BLD VENIPUNCTURE: CPT

## 2023-08-13 PROCEDURE — 93005 ELECTROCARDIOGRAM TRACING: CPT

## 2023-08-13 PROCEDURE — 83735 ASSAY OF MAGNESIUM: CPT

## 2023-08-13 PROCEDURE — 99233 SBSQ HOSP IP/OBS HIGH 50: CPT | Mod: GC | Performed by: STUDENT IN AN ORGANIZED HEALTH CARE EDUCATION/TRAINING PROGRAM

## 2023-08-13 PROCEDURE — 250N000011 HC RX IP 250 OP 636

## 2023-08-13 PROCEDURE — 250N000012 HC RX MED GY IP 250 OP 636 PS 637

## 2023-08-13 PROCEDURE — 93010 ELECTROCARDIOGRAM REPORT: CPT | Performed by: INTERNAL MEDICINE

## 2023-08-13 PROCEDURE — 84132 ASSAY OF SERUM POTASSIUM: CPT

## 2023-08-13 RX ORDER — MAGNESIUM OXIDE 400 MG/1
400 TABLET ORAL EVERY 4 HOURS
Status: DISCONTINUED | OUTPATIENT
Start: 2023-08-13 | End: 2023-08-13 | Stop reason: SINTOL

## 2023-08-13 RX ADMIN — HEPARIN SODIUM 5000 UNITS: 5000 INJECTION, SOLUTION INTRAVENOUS; SUBCUTANEOUS at 15:57

## 2023-08-13 RX ADMIN — MYCOPHENOLATE MOFETIL 500 MG: 250 CAPSULE ORAL at 08:46

## 2023-08-13 RX ADMIN — MYCOPHENOLATE MOFETIL 500 MG: 250 CAPSULE ORAL at 21:25

## 2023-08-13 RX ADMIN — TAMSULOSIN HYDROCHLORIDE 0.4 MG: 0.4 CAPSULE ORAL at 07:53

## 2023-08-13 RX ADMIN — ONDANSETRON 4 MG: 4 TABLET, ORALLY DISINTEGRATING ORAL at 08:10

## 2023-08-13 RX ADMIN — PANTOPRAZOLE SODIUM 40 MG: 40 TABLET, DELAYED RELEASE ORAL at 07:53

## 2023-08-13 RX ADMIN — Medication 50 MCG: at 15:57

## 2023-08-13 RX ADMIN — LAMIVUDINE 25 MG: 10 SOLUTION ORAL at 07:54

## 2023-08-13 RX ADMIN — PREDNISONE 5 MG: 5 TABLET ORAL at 07:53

## 2023-08-13 RX ADMIN — ASPIRIN 81 MG: 81 TABLET, COATED ORAL at 07:53

## 2023-08-13 RX ADMIN — HEPARIN SODIUM 5000 UNITS: 5000 INJECTION, SOLUTION INTRAVENOUS; SUBCUTANEOUS at 07:54

## 2023-08-13 RX ADMIN — INSULIN DEGLUDEC INJECTION 15 UNITS: 100 INJECTION, SOLUTION SUBCUTANEOUS at 21:29

## 2023-08-13 RX ADMIN — PROCHLORPERAZINE MALEATE 5 MG: 5 TABLET ORAL at 12:43

## 2023-08-13 ASSESSMENT — ACTIVITIES OF DAILY LIVING (ADL)
ADLS_ACUITY_SCORE: 22
ADLS_ACUITY_SCORE: 24
ADLS_ACUITY_SCORE: 24
ADLS_ACUITY_SCORE: 23
ADLS_ACUITY_SCORE: 23
ADLS_ACUITY_SCORE: 24
ADLS_ACUITY_SCORE: 22
ADLS_ACUITY_SCORE: 24
ADLS_ACUITY_SCORE: 22
ADLS_ACUITY_SCORE: 24

## 2023-08-13 NOTE — PROGRESS NOTES
Nephrology Progress Note  08/13/2023        Miller Workman is a 58 year old man with a PMH of DMII, bipolar disorder, cirrhosis s/t ESTRADA with subsequent development of HCC, s/p liver transplant , HLD, HTN, GERD, BPH and CAD with hx of 2 vessel CABG in July 2021, CKD with anemia of chronic disease, on erythropoetin.Recently he was diagnosed with metastatic lesions in the peritoneum for this he was started on sorafenib on 7/29/2023. He presented with more than a week of not feeling well. After starting sorafenib he subsequently had diarrhea for several days. In the past 3-4 days he has mostly had severe nausea and vomiting. At presentation to the ED 8/10 he was noted to have JERONIMO with severe acidosis and hyperklamemia    Interval History :   Yesterday, the patient has hyperkalemia 5.4 upon repeat is improved.  We encouraged patient to drink more fluids and serum sodium improved from 1 47-1 25.  Urine output was 1.6 L yesterday.  Labs this morning show improvement of creatinine down to 2.82 from 3.3 yesterday.  Potassium 4.5 and bicarb 20.  He is feeling better today.  Has 2 bowel movement.  Denies any nauseous vomiting.       Assessment & Recommendations:   # JERONIMO, stage 3 required IHD on 8/10/2 emergently due to hyperkalemia of 7.7 and metabolic acidosis  # CKD stage 3 likely prior JERONIMO in the past ; baseline Cr 1.8; followed by Dr. Rao  # S/p Ltx in 2019 for liver cancer; started Sorafenib on 7/9/23; followed by Dr. Villarreal  Likely in the setting of diarrhea while on lisinopril and Jardiance. I doubt that his JERONIMO is from Sorafenib. Usually renal SE include hypertension and proteinuria and TMA.  - Please continue to hold Jardiance, lisinopril for now  - Discussed with him about the importance of holding lisinopril and Jardiance while he is sick  - Please remove dialysis line  - Needs outpatient follow-up with Dr. Rao once he is ready to be dismissed  # Hyperkalemia; resolved  K is 4.7. Hold lisinopril for now.  "  # Mild Hypernatremia likely from post ATN diuresis; resolved  The patient drinks 1.6 L yesterday.  Serum sodium has improved from 1 47->145.  Encourage him to sty well hydrated.   # Metabolic acidosis in the setting of severe acute kidney injury, diarrhea   Though on Metformin, this is not HERNAN. Urine ketone was negative.   - No need for Sodium bicarb oral yet    Time spent: 50 minutes on this date of encounter for chart review, physical exam, medical decision making and co-ordination of care.    Recommendations were communicated to primary team via this note    Jhonny Evans MD MD, FASN    Review of Systems:   All systems were reviewed. Pertinent positives and negatives are noted above    Physical Exam:   I/O last 3 completed shifts:  In: 1620 [P.O.:1620]  Out: 950 [Urine:950]   BP (!) 141/76 (BP Location: Right arm, Cuff Size: Adult Regular)   Pulse 100   Temp 98.1  F (36.7  C) (Oral)   Resp 14   Ht 1.753 m (5' 9\")   Wt 85.1 kg (187 lb 9.8 oz)   SpO2 100%   BMI 27.71 kg/m       Gen: NAD alert and oriented X 3, pleasant  Lungs: Clear at bases. No dullness to percussion  Card; regular but tachycardic.  Abd: good bowel sounds. Non tender  Ext: no LE edema  Access: Rt temporary dialysis catheter was just removed.  Neuro: no asterixis no tremor no myoclonus  Skin: no petechia no other rashes    Labs:   All labs reviewed by me  Electrolytes/Renal -   Recent Labs   Lab Test 08/13/23  0847 08/13/23  0814 08/12/23  2210 08/12/23  2150 08/12/23  1833 08/12/23  1719 08/12/23  1211 08/12/23  0746 08/12/23  0702 08/11/23  2154 08/11/23  1837 08/10/23  1833 08/10/23  1024 07/28/23  1046 06/14/23  0924 03/30/23  0905   NA  --  145  --   --  141  --   --   --  147*  --   --    < > 139 130*   < > 139   POTASSIUM  --  4.5  --   --  5.2  --  5.2  --  5.4*  --  4.7   < > 6.3* 5.5*   < > 4.5   CHLORIDE  --  110*  --   --  105  --   --   --  111*  --   --    < > 112* 103   < > 103   CO2  --  20*  --   --  16*  --   --   -- "  17*  --   --    < > 9* 17*   < > 26   BUN  --  55.8*  --   --  63.6*  --   --   --  65.8*  --   --    < > 107.0* 56.8*   < > 29.1*   CR  --  2.82*  --   --  3.27*  --   --   --  3.78*  --   --    < > 6.50* 2.46*   < > 1.80*   * 124* 186*   < > 178*   < >  --    < > 114*   < >  --    < > 180* 60*   < > 204*   DEBORAH  --  8.4*  --   --  8.5*  --   --   --  8.4*  --   --    < > 9.1 9.1   < > 9.2   MAG  --  1.9  --   --   --   --   --   --  2.0  --  1.8   < > 2.3 2.2  --   --    PHOS  --   --   --   --   --   --   --   --   --   --   --   --  6.0* 3.4  --  3.4    < > = values in this interval not displayed.       CBC -   Recent Labs   Lab Test 08/13/23  0814 08/12/23  0702 08/11/23  0640   WBC 4.5 4.8 4.8   HGB 11.8* 12.0* 10.0*   * 126* 106*       LFTs -   Recent Labs   Lab Test 08/10/23  2151 08/10/23  1833 08/10/23  1024   ALKPHOS 84 108 110   BILITOTAL 0.2 0.3 0.4   ALT 16 26 27   AST 25 22 24   PROTTOTAL 6.0* 7.3 7.6   ALBUMIN 3.4* 4.3 4.4       Iron Panel -   Recent Labs   Lab Test 04/25/22  0925 02/09/22  0850 11/16/21  1004   IRON 119 96 93   IRONSAT 42 39 44   QUAN 508* 1,046* 400*       Current Medications:   aspirin  81 mg Oral Daily    heparin ANTICOAGULANT  5,000 Units Subcutaneous Q8H    insulin aspart  1-7 Units Subcutaneous TID AC    insulin aspart  1-5 Units Subcutaneous At Bedtime    insulin degludec  15 Units Subcutaneous At Bedtime    lamiVUDine  25 mg Oral Daily    [Held by provider] lamiVUDine  100 mg Oral Daily    [Held by provider] metoprolol succinate ER  12.5 mg Oral Daily    mycophenolate  500 mg Oral Q12H    pantoprazole  40 mg Oral QAM AC    predniSONE  5 mg Oral Daily    [Held by provider] rosuvastatin  20 mg Oral Daily    tamsulosin  0.4 mg Oral Daily    vitamin D3  50 mcg Oral Daily       Jhonny Evans MD

## 2023-08-13 NOTE — PROGRESS NOTES
[facility]    Medicine Progress Note - Medicine Service, YURIDIA TEAM 4    Date of Admission:  8/10/2023    Assessment & Plan   Frandy Workman is a 58 year old male admitted on 8/10/2023 who is being transferred out of the ICU on 8/11/2023. He has a history of CAD s/p CABG in 2021, DMII, CKD, and cirrhosis with HCC now s/p liver transplant in 2019 who has since developed new peritoneal metastases and was started on Sorafenib on 7/29/23. He was admitted to the ICU on 8/10/2023 for emergent dialysis for hyperkalemia (8) with EKG changes 2/2 JERONIMO on CKD stage III. K now improved after shifting in the ED and one round of HD.     Today:  - K check in evening  - Transfer order  - Remove dialysis catheter  - Labs stable and Cr downtrending    # Hyperkalemia, resolved  # Mild hypernatremia; resolved  Potassium 8 on admission. EKG with peaked T waves, Qtc 422. S/p calcium gluconate, insulin/dextrose, and now one round of HD clearance. Likely 2/2 renal failiure and metabolic acidosis. EKG upon arrival in the ICU with resolution of peaked T waves. Mild hypernatremia 8/12, discussed increasing p.o. fluid intake with patient. Now resolved.  - Nephrology consulted, appreciate recs  - Dialysis line placed; plan to remove on 8/13  - Monitor on labs  - K check at 1900     # Hyperchloremic non-anion gap metabolic acidosis; resolved  # Anion gap metabolic acidosis; resolved  VBG with pH 7.07, bicarb 9, and anion gap 18 on admission. Delta delta ratio was 0.4. Likely a combination of anion gap and non-anion gap metabolic acidosis. Anion gap acidosis 2/2 starvation ketoacidosis and renal failureA. NAGMA 2/2 bicarb losses due to recent diarrhea. Elevated beta-hydroxybutyrate  - Monitor     # JERONIMO on CKD stage III, intrarenal; improving  # Suspect ATN  # Diabetic nephropathy  Baseline Cr at 2, elevated to 7.38 on admission now down to 2.8 after dialysis and fluid resuscitations.   - Strict  I/Os  - Monitor on labs  - Nephrology consulted     # Hypocalcemia  # Hypomagnesemia  - Replace as needed    # ESTRADA cirrhosis c/b portal vein thrombosis  # s/p Liver transplant 11/2019  # HCC s/p TACE 1/2019, now recurrent w/ mets to peritoneum recently initiated on Sorafenib  # HBcAg +ve donor  # Immunosuppressed  # Hx of Esophageal varices  He had a liver transplant in 2019 for HCC and cirrhosis. In June 2023, found to have new biopsy proven peritoneal metastases of HCC. Started on oral chemotherapy (sorafenib 400mg BID) on 7/29/23, held by oncology due to vomiting and diarrhea. Immunosuppression with prednisone and mycophenolate. LFTs on admission wnl. CT abdomen with stable post-surgical changes of transplant liver.  - Monitor MELD labs  - Mycophenolate 500mg BID  - Continue prednisone 5 mg  - Lamivudine 25 mg down from 100mg      # Nausea/Vomiting  # Diarrhea  # Anorexia  # Nutrition  Reports these symptoms resolved multiple days prior to admission after starting Zofran. Had 4 days of N/V/D. Suspect 2/2 to chemotherapy, post nasal drip from viral URI, and/or viral gastroenteritis. Patient has not been maintaining adequate hydration for the past few days.    - Zofran prn  - Renal diet    # Viral URI  Patient's caretaker had a URI recently. Productive cough for about 5 days. No fevers or leukocytosis currently. No consolidation on CXR. Viral panel positive for Rhinovirus. Received vanc + zosyn in ED.  - Blood cultures from ED NGTD    # CAD s/p 2 vessel CABG to LAD and OM  # HTN  # HLD  S/p CABG 7/2021. On metoprolol 12.5 mg daily, rosuvastatin 20 mg daily, and lisinopril 10 mg. Appears volume down on exam with dry mucus membranes and tachycardia with recent low PO intake.  - hold metoprolol and lisinopril due due to volume status and renal function. Resume once stable  - MAP goal >65  - consider new BP agent upon discharge     # Elevated troponin  Troponin elevated to 66 on admission, down to 46 on recheck. No ST  segment changes on EKG, suspect a type 2 demand ischemia vs decreased renal clearance.  - continue to monitor    # DMII  Last A1c 6.3. Well-controlled with outpatient regimen of basal insulin 34 units, carb count insulin, metformin, and empagliflozin.   - Hold PTA diabetes meds  - Basal insulin 15 units daily  - MDSS    # Thrombocytopenia, acute   PLT down to 106k from 178k on the day of admission. Likely dilutional. Ordered bilateral upper and lower extremity doppler US to assess for HIT: no evidence of DVT  - Monitor on CBC     # Chronic anemia, macrocytic  Baseline hgb 11, stable here. Mixed picture with macrocytic and normocytic. Could be secondary to anemia of chronic disease vs medication adverse effects vs nutritional deficiencies. Does receive EPO per renal notes.  - Monitor hgb, transfuse <7    # Erythematous macular rash  Erythematous macular rash on the neck and chest. Patient states this developed today. Does not appear to be urticaria. It could be an adverse effect of sorafenib.  - monitor    # Bipolar disorder  Was previously on lithium, but no recent PTA meds.    # Elevated lipase  Lipase elevated to 797. Mild tenderness on exam with known peritoneal metastases and recent laparoscopic abdominal exploration. No apparent fat stranding on abdominal CT per our read, no concerns per discussion with radiology.   - monitor clinically       Diet: Renal Diet (dialysis)  Snacks/Supplements Adult: Other; PRN per pt request; With Meals    DVT Prophylaxis: Heparin SQ  Carroll Catheter: Not present  Lines: PRESENT      CVC Double Lumen Right Internal jugular-Site Assessment: WDL      Cardiac Monitoring: ACTIVE order. Indication: ICU  Code Status: Full Code      Clinically Significant Risk Factors        # Hyperkalemia: Highest K = 5.4 mmol/L in last 2 days, will monitor as appropriate  # Hypernatremia: Highest Na = 147 mmol/L in last 2 days, will monitor as appropriate     # Anion Gap Metabolic Acidosis: Highest  "Anion Gap = 20 mmol/L in last 2 days, will monitor and treat as appropriate  # Hypoalbuminemia: Lowest albumin = 3.4 g/dL at 8/10/2023  9:51 PM, will monitor as appropriate       # Thrombocytopenia: Lowest platelets = 110 in last 2 days, will monitor for bleeding     # Hypertension: Noted on problem list        # Overweight: Estimated body mass index is 27.71 kg/m  as calculated from the following:    Height as of this encounter: 1.753 m (5' 9\").    Weight as of this encounter: 85.1 kg (187 lb 9.8 oz).  , PRESENT ON ADMISSION    # Severe Malnutrition: based on nutrition assessment, PRESENT ON ADMISSION        Disposition Plan      Expected Discharge Date: 08/14/2023                  Yusef Soares Jr., MD  Medicine Service, [address]  Securely message with [employer] (more info)  Text page via [facility] Paging/Directory   See signed in provider for up to date coverage information  ______________________________________________________________________    Interval History   NAEO. Patient is doing well. Labs improving and urine output is adequate. Appetite is good. No pain or other concerns. No shortness of breath, abdominal pain. He is looking forward to going for a walk today. No fevers or chills.    Physical Exam   Vital Signs: Temp: 98.1  F (36.7  C) Temp src: Oral BP: (!) 141/76 Pulse: 100   Resp: 14 SpO2: 100 % O2 Device: None (Room air)    Weight: 187 lbs 9.78 oz    General: Awake, sitting on side of bed.  NAD  HEENT: subconjunctival hemorrhage in right eye, dry MM with aphthous ulcer on the hard palate (stable)  Neuro: A&Ox3  Pulm/Resp: Nonlabored breathing, good air entry bilaterally.   CV: tachycardic, regular rhythm, no murmurs appreciated on auscultation,  Abdomen: Soft, mildly distended, no tenderness to palpation. Hypoactive bowel sounds  Incisions/Skin: erythematous macular rash on neck extending midline chest (stable), no rash on palms or " soles.  Extremities: No lower extremity edema    Medical Decision Making     Data     I have personally reviewed the following data over the past 24 hrs:    4.5  \   11.8 (L)   / 110 (L)     145 110 (H) 55.8 (H) /  118 (H)   4.5 20 (L) 2.82 (H) \

## 2023-08-13 NOTE — PLAN OF CARE
Goal Outcome Evaluation:      Plan of Care Reviewed With: patient    Overall Patient Progress: improvingOverall Patient Progress: improving    Outcome Evaluation: remains floor status; team has discussed possible AV fistula placement to continue HD    ICU End of Shift Summary. See flowsheets for vital signs and detailed assessment.    Changes this shift:  uneventful night.  Requested to be allowed to sleep through night and have honored that.  Will do cares with lab draw.  NSR.  Denies pain other than some mild back pain and right neck discomfort from HD line.      Plan:  Transfer to floor when bed available.

## 2023-08-13 NOTE — PLAN OF CARE
ICU End of Shift Summary. See flowsheets for vital signs and detailed assessment.    Changes this shift: Afebrile. VSS. No complaints of pain this shift. Patient ambulated several times around unit and tolerated very well. Ambulating independently in the room as well with no issues. Zofran Prn x1 and compazine PRN x1 due to intermittent nausea primarily during mealtimes, but no emesis this shift. Appetite is improving per patient and Miller was able to finish most of his meals throughout the day. HD line removed per provider order today, patient tolerated well. Patient up to date on plan of care. Friend Gabrielle came to visit today.     Plan: Transfer to appropriate floor when bed becomes available. Continue to trend labs. Encourage patient to continue increasing intake.     Goal Outcome Evaluation:      Plan of Care Reviewed With: patient    Overall Patient Progress: improvingOverall Patient Progress: improving    Outcome Evaluation: Still awaiting bed on floor. HD line removed today per provider order. Appetite improving

## 2023-08-14 LAB
ANION GAP SERPL CALCULATED.3IONS-SCNC: 14 MMOL/L (ref 7–15)
ATRIAL RATE - MUSE: 93 BPM
ATRIAL RATE - MUSE: NORMAL BPM
BASOPHILS # BLD AUTO: 0 10E3/UL (ref 0–0.2)
BASOPHILS NFR BLD AUTO: 1 %
BUN SERPL-MCNC: 51.2 MG/DL (ref 8–23)
CALCIUM SERPL-MCNC: 8.3 MG/DL (ref 8.6–10)
CHLORIDE SERPL-SCNC: 109 MMOL/L (ref 98–107)
CREAT SERPL-MCNC: 2.31 MG/DL (ref 0.67–1.17)
DEPRECATED HCO3 PLAS-SCNC: 19 MMOL/L (ref 22–29)
DIASTOLIC BLOOD PRESSURE - MUSE: NORMAL MMHG
DIASTOLIC BLOOD PRESSURE - MUSE: NORMAL MMHG
EOSINOPHIL # BLD AUTO: 0.1 10E3/UL (ref 0–0.7)
EOSINOPHIL NFR BLD AUTO: 2 %
ERYTHROCYTE [DISTWIDTH] IN BLOOD BY AUTOMATED COUNT: 13.2 % (ref 10–15)
ERYTHROCYTE [DISTWIDTH] IN BLOOD BY AUTOMATED COUNT: 13.2 % (ref 10–15)
GFR SERPL CREATININE-BSD FRML MDRD: 32 ML/MIN/1.73M2
GLUCOSE BLDC GLUCOMTR-MCNC: 127 MG/DL (ref 70–99)
GLUCOSE BLDC GLUCOMTR-MCNC: 186 MG/DL (ref 70–99)
GLUCOSE BLDC GLUCOMTR-MCNC: 201 MG/DL (ref 70–99)
GLUCOSE BLDC GLUCOMTR-MCNC: 231 MG/DL (ref 70–99)
GLUCOSE BLDC GLUCOMTR-MCNC: 99 MG/DL (ref 70–99)
GLUCOSE SERPL-MCNC: 115 MG/DL (ref 70–99)
HCT VFR BLD AUTO: 33.7 % (ref 40–53)
HCT VFR BLD AUTO: 37.9 % (ref 40–53)
HGB BLD-MCNC: 10.8 G/DL (ref 13.3–17.7)
HGB BLD-MCNC: 12.3 G/DL (ref 13.3–17.7)
IMM GRANULOCYTES # BLD: 0 10E3/UL
IMM GRANULOCYTES NFR BLD: 0 %
INTERPRETATION ECG - MUSE: NORMAL
INTERPRETATION ECG - MUSE: NORMAL
LYMPHOCYTES # BLD AUTO: 0.6 10E3/UL (ref 0.8–5.3)
LYMPHOCYTES NFR BLD AUTO: 10 %
MAGNESIUM SERPL-MCNC: 1.9 MG/DL (ref 1.7–2.3)
MCH RBC QN AUTO: 30.9 PG (ref 26.5–33)
MCH RBC QN AUTO: 31.4 PG (ref 26.5–33)
MCHC RBC AUTO-ENTMCNC: 32 G/DL (ref 31.5–36.5)
MCHC RBC AUTO-ENTMCNC: 32.5 G/DL (ref 31.5–36.5)
MCV RBC AUTO: 97 FL (ref 78–100)
MCV RBC AUTO: 97 FL (ref 78–100)
MONOCYTES # BLD AUTO: 0.3 10E3/UL (ref 0–1.3)
MONOCYTES NFR BLD AUTO: 4 %
NEUTROPHILS # BLD AUTO: 5 10E3/UL (ref 1.6–8.3)
NEUTROPHILS NFR BLD AUTO: 83 %
NRBC # BLD AUTO: 0 10E3/UL
NRBC BLD AUTO-RTO: 0 /100
P AXIS - MUSE: 21 DEGREES
P AXIS - MUSE: NORMAL DEGREES
PATH REPORT.COMMENTS IMP SPEC: NORMAL
PATH REPORT.COMMENTS IMP SPEC: NORMAL
PATH REPORT.FINAL DX SPEC: NORMAL
PATH REPORT.MICROSCOPIC SPEC OTHER STN: NORMAL
PATH REPORT.MICROSCOPIC SPEC OTHER STN: NORMAL
PATH REPORT.RELEVANT HX SPEC: NORMAL
PLATELET # BLD AUTO: 102 10E3/UL (ref 150–450)
PLATELET # BLD AUTO: 104 10E3/UL (ref 150–450)
PLATELETS.RETICULATED NFR BLD AUTO: 1.3 % (ref 1–7)
POTASSIUM SERPL-SCNC: 4.5 MMOL/L (ref 3.4–5.3)
POTASSIUM SERPL-SCNC: 4.7 MMOL/L (ref 3.4–5.3)
POTASSIUM SERPL-SCNC: 5.1 MMOL/L (ref 3.4–5.3)
PR INTERVAL - MUSE: 130 MS
PR INTERVAL - MUSE: NORMAL MS
QRS DURATION - MUSE: 128 MS
QRS DURATION - MUSE: 82 MS
QT - MUSE: 344 MS
QT - MUSE: 354 MS
QTC - MUSE: 440 MS
QTC - MUSE: 459 MS
R AXIS - MUSE: 13 DEGREES
R AXIS - MUSE: 222 DEGREES
RBC # BLD AUTO: 3.49 10E6/UL (ref 4.4–5.9)
RBC # BLD AUTO: 3.92 10E6/UL (ref 4.4–5.9)
RETICS # AUTO: 0.04 10E6/UL (ref 0.03–0.1)
RETICS/RBC NFR AUTO: 1.1 % (ref 0.5–2)
SODIUM SERPL-SCNC: 142 MMOL/L (ref 136–145)
SYSTOLIC BLOOD PRESSURE - MUSE: NORMAL MMHG
SYSTOLIC BLOOD PRESSURE - MUSE: NORMAL MMHG
T AXIS - MUSE: 24 DEGREES
T AXIS - MUSE: 80 DEGREES
VENTRICULAR RATE- MUSE: 107 BPM
VENTRICULAR RATE- MUSE: 93 BPM
WBC # BLD AUTO: 4.6 10E3/UL (ref 4–11)
WBC # BLD AUTO: 6 10E3/UL (ref 4–11)

## 2023-08-14 PROCEDURE — 85055 RETICULATED PLATELET ASSAY: CPT

## 2023-08-14 PROCEDURE — 36415 COLL VENOUS BLD VENIPUNCTURE: CPT

## 2023-08-14 PROCEDURE — 120N000011 HC R&B TRANSPLANT UMMC

## 2023-08-14 PROCEDURE — 99207 PR CDG-CUT & PASTE-POTENTIAL IMPACT ON LEVEL: CPT | Performed by: STUDENT IN AN ORGANIZED HEALTH CARE EDUCATION/TRAINING PROGRAM

## 2023-08-14 PROCEDURE — 84132 ASSAY OF SERUM POTASSIUM: CPT

## 2023-08-14 PROCEDURE — 85060 BLOOD SMEAR INTERPRETATION: CPT | Performed by: PATHOLOGY

## 2023-08-14 PROCEDURE — 250N000011 HC RX IP 250 OP 636

## 2023-08-14 PROCEDURE — 250N000013 HC RX MED GY IP 250 OP 250 PS 637: Performed by: STUDENT IN AN ORGANIZED HEALTH CARE EDUCATION/TRAINING PROGRAM

## 2023-08-14 PROCEDURE — 85045 AUTOMATED RETICULOCYTE COUNT: CPT

## 2023-08-14 PROCEDURE — 99233 SBSQ HOSP IP/OBS HIGH 50: CPT | Mod: GC | Performed by: STUDENT IN AN ORGANIZED HEALTH CARE EDUCATION/TRAINING PROGRAM

## 2023-08-14 PROCEDURE — 85014 HEMATOCRIT: CPT

## 2023-08-14 PROCEDURE — 85027 COMPLETE CBC AUTOMATED: CPT

## 2023-08-14 PROCEDURE — 250N000013 HC RX MED GY IP 250 OP 250 PS 637

## 2023-08-14 PROCEDURE — 83735 ASSAY OF MAGNESIUM: CPT

## 2023-08-14 PROCEDURE — 250N000012 HC RX MED GY IP 250 OP 636 PS 637

## 2023-08-14 RX ORDER — MAGNESIUM OXIDE 400 MG/1
400 TABLET ORAL EVERY 4 HOURS
Status: COMPLETED | OUTPATIENT
Start: 2023-08-14 | End: 2023-08-14

## 2023-08-14 RX ADMIN — MAGNESIUM OXIDE TAB 400 MG (241.3 MG ELEMENTAL MG) 400 MG: 400 (241.3 MG) TAB at 14:11

## 2023-08-14 RX ADMIN — LAMIVUDINE 25 MG: 10 SOLUTION ORAL at 07:45

## 2023-08-14 RX ADMIN — ASPIRIN 81 MG: 81 TABLET, COATED ORAL at 07:45

## 2023-08-14 RX ADMIN — HEPARIN SODIUM 5000 UNITS: 5000 INJECTION, SOLUTION INTRAVENOUS; SUBCUTANEOUS at 23:32

## 2023-08-14 RX ADMIN — HEPARIN SODIUM 5000 UNITS: 5000 INJECTION, SOLUTION INTRAVENOUS; SUBCUTANEOUS at 17:10

## 2023-08-14 RX ADMIN — MYCOPHENOLATE MOFETIL 500 MG: 250 CAPSULE ORAL at 09:20

## 2023-08-14 RX ADMIN — HEPARIN SODIUM 5000 UNITS: 5000 INJECTION, SOLUTION INTRAVENOUS; SUBCUTANEOUS at 07:45

## 2023-08-14 RX ADMIN — MAGNESIUM OXIDE TAB 400 MG (241.3 MG ELEMENTAL MG) 400 MG: 400 (241.3 MG) TAB at 18:36

## 2023-08-14 RX ADMIN — PREDNISONE 5 MG: 5 TABLET ORAL at 07:45

## 2023-08-14 RX ADMIN — PANTOPRAZOLE SODIUM 40 MG: 40 TABLET, DELAYED RELEASE ORAL at 07:45

## 2023-08-14 RX ADMIN — TAMSULOSIN HYDROCHLORIDE 0.4 MG: 0.4 CAPSULE ORAL at 07:45

## 2023-08-14 RX ADMIN — Medication 50 MCG: at 17:10

## 2023-08-14 RX ADMIN — HEPARIN SODIUM 5000 UNITS: 5000 INJECTION, SOLUTION INTRAVENOUS; SUBCUTANEOUS at 00:29

## 2023-08-14 RX ADMIN — INSULIN DEGLUDEC INJECTION 15 UNITS: 100 INJECTION, SOLUTION SUBCUTANEOUS at 21:48

## 2023-08-14 RX ADMIN — MYCOPHENOLATE MOFETIL 500 MG: 250 CAPSULE ORAL at 20:26

## 2023-08-14 ASSESSMENT — ACTIVITIES OF DAILY LIVING (ADL)
ADLS_ACUITY_SCORE: 22
ADLS_ACUITY_SCORE: 23
ADLS_ACUITY_SCORE: 22
ADLS_ACUITY_SCORE: 22

## 2023-08-14 NOTE — PROGRESS NOTES
Patient has remained stable.  Right neck site CDI.  VSS.  Bed available on 7A.  Report called to Radha BYRNE. Medication, chart, belongings, cell phone with  sent with patient.  Transport OK off monitor and brought in wheelchair.   Recheck of K+ at [facility]. Refer to flowsheets for info.

## 2023-08-14 NOTE — PROGRESS NOTES
Brief Nephrology Note      Mr Layne's renal fx and UOP continue to improve, Cr down to 2.3, trending away from needing RRT.  Would continue to hold ACE/ARB and SGLT2 for now, will plan to follow up in CKD clinic with Dr Rao (requested).  Will sign off from daily visits but if renal issues arise please do not hesitate to page me at the number below.        SHANIQUE Gorman CNS  Clinical Nurse Specialist  [phone]

## 2023-08-14 NOTE — PLAN OF CARE
Goal Outcome Evaluation:  Care from 12 pm to 7 pm  A&Ox4. AVSS.Denied pain.Up ad karely  in room and morneo. On cardiac monitor; NSR.  Mag 1.9; replaced per replacement protocol; recheck scheduled for am. ACHS BS; covered per sliding scale Pt reported that no diarrhea today. Continue with POC

## 2023-08-14 NOTE — PROGRESS NOTES
AMI Week 1 Survey      Responses   Facility patient discharged from?  [place]   Does the patient have one of the following disease processes/diagnoses(primary or secondary)?  Acute MI (STEMI,NSTEMI)   Is there a successful TCM telephone encounter documented?  No   Week 1 attempt successful?  Yes   Call start time  1039   Call end time  1107   Discharge diagnosis  Acute MI, inferior wall, Tobacco abuse, heart cath (R Arm) with stent placement   Meds reviewed with patient/caregiver?  Yes   Is the patient having any side effects they believe may be caused by any medication additions or changes?  No   Does the patient have all prescriptions related to this admission filled (includes statins,anticoagulants,HTN meds,anti-arrhythmia meds)  Yes   Is the patient taking all medications as directed (includes completed medication regime)?  Yes   Comments regarding appointments  Cardiac Rehab ordered   Does the patient have a primary care provider?   Yes   Does the patient have an appointment with their PCP,cardiologist,or clinic within 7 days of discharge?  Greater than 7 days   What is preventing the patient from scheduling follow up appointments within 7 days of discharge?  Earlier appointment not available   Nursing Interventions  Verified appointment date/time/provider   Has the patient kept scheduled appointments due by today?  N/A   Comments  Calling today for f/u appt /c Cardiology   Psychosocial issues?  No   Comments  LHC- right wrist healing well, no bruising. Pt has stopped smoking and is eating more healthy.    Did the patient receive a copy of their discharge instructions?  Yes   Nursing interventions  Reviewed instructions with patient   What is the patient's perception of their health status since discharge?  Improving   Nursing interventions  Nurse provided patient education, Advised patient to call provider   Is the patient/caregiver able to teach back signs and symptoms of when to call for help immediately:  Admitted/transferred from: 4C   Time of arrival on unit 2300   2 RN full  skin assessment completed by Oriana HEATH, and Alisha GONZALEZ   Skin assessment finding: issues found bruising on forearms,  puncture sites/scabbing scattered   on abdomen from heparin injections, lap site above belly button, slight rash on chest, redness on scalp, old HD line site on left side of neck (did not remove bandage as it was just removed and needs to stay on until 4pm on 8/14)  Interventions/actions: no interventions at this time    Will continue to monitor.      Sudden chest discomfort, Sudden discomfort in arms, back, neck or jaw, Dizziness or lightheadedness, Shortness of breath at any time, Sudden sweating or clammy skin   Nursing interventions  Nurse provided patient education   Is the pateint /caregiver able to teach back the importance of cardiac rehab?  Yes   Nursing interventions  Provided education on importance of cardiac rehab   Is the patient/caregiver able to teach back lifestyle changes to help prevent MIs  Quit smoking, Reducing stress, Regular exercise as approved by provider, Heart healthy diet   Is the patient/caregiver able to teach back ways to prevent a second heart attack:  Take medications, Participate in Cardiac Rehab   If the patient is a current smoker, are they able to teach back resources for cessation?  [id number]   Is the patient/caregiver able to teach back the hierarchy of who to call/visit for symptoms/problems? PCP, Specialist, Home health nurse, Urgent Care, ED, 911  Yes   Week 1 call completed?  Yes          Linn Doherty RN

## 2023-08-14 NOTE — PLAN OF CARE
"VS: BP (!) 136/95 (BP Location: Right arm)   Pulse 100   Temp 98.9  F (37.2  C) (Oral)   Resp 18   Ht 1.753 m (5' 9\")   Wt 83.1 kg (183 lb 4.8 oz)   SpO2 100%   BMI 27.07 kg/m      Cares: 2300 - 1100 (from ) // pt is confidential    Neuro: Aox4 (calm, cooperative, and pleasant)  VS: VSS   Cardiac: WDL   Respiratory: WDL   GI/: voiding adequately w/out issues, LBM 8-13  Skin: old HD line on right neck (dressing to stay on until 4pm)  Diet: Renal   Labs: potassium: 4.5, creatinine trending down at 2.31  BG: ACHS (127, 99, 186)  LDA: Right PIV SL   Mobility: Ind.   Pain/Nausea: denies pain or nausea    PRN medications: none given   Plan of Care: continue with current POC and update MD with any changes  "

## 2023-08-14 NOTE — PROGRESS NOTES
[facility]    Medicine Progress Note - Medicine Service, YURIDIA TEAM 4    Date of Admission:  8/10/2023    Assessment & Plan   Frandy Workman is a 58 year old male admitted on 8/10/2023 who is being transferred out of the ICU on 8/11/2023. He has a history of CAD s/p CABG in 2021, DMII, CKD, and cirrhosis with HCC now s/p liver transplant in 2019 who has since developed new peritoneal metastases and was started on Sorafenib on 7/29/23. He was admitted to the ICU on 8/10/2023 for emergent dialysis for hyperkalemia (8) with EKG changes 2/2 JERONIMO on CKD stage III. K now improved after shifting in the ED and one round of HD.     Today:  - IPF; patient with thrombocytopenia  - K recheck at 1900    # Hyperkalemia, resolved  # Mild hypernatremia; resolved  Potassium 8 on admission. EKG with peaked T waves, Qtc 422. S/p calcium gluconate, insulin/dextrose, and now one round of HD clearance. Likely 2/2 renal failiure and metabolic acidosis. EKG upon arrival in the ICU with resolution of peaked T waves. Mild hypernatremia 8/12, discussed increasing p.o. fluid intake with patient. Now resolved.  - Nephrology consulted, appreciate recs  - Dialysis line placed; plan to remove on 8/13  - Monitor on labs  - K check at 1900     # Hyperchloremic non-anion gap metabolic acidosis; resolved  # Anion gap metabolic acidosis; resolved  VBG with pH 7.07, bicarb 9, and anion gap 18 on admission. Delta delta ratio was 0.4. Likely a combination of anion gap and non-anion gap metabolic acidosis. Anion gap acidosis 2/2 starvation ketoacidosis and renal failureA. NAGMA 2/2 bicarb losses due to recent diarrhea. Elevated beta-hydroxybutyrate  - Monitor     # JERONIMO on CKD stage III, intrarenal; improving  # Suspect ATN  # Diabetic nephropathy  Baseline Cr at 2, elevated to 7.38 on admission now down to 2.8 after dialysis and fluid resuscitations.   - Strict I/Os  - Monitor on labs  - Nephrology consulted      # Hypocalcemia  # Hypomagnesemia  - Replace as needed    # ESTRADA cirrhosis c/b portal vein thrombosis  # s/p Liver transplant 11/2019  # HCC s/p TACE 1/2019, now recurrent w/ mets to peritoneum recently initiated on Sorafenib  # HBcAg +ve donor  # Immunosuppressed  # Hx of Esophageal varices  He had a liver transplant in 2019 for HCC and cirrhosis. In June 2023, found to have new biopsy proven peritoneal metastases of HCC. Started on oral chemotherapy (sorafenib 400mg BID) on 7/29/23, held by oncology due to vomiting and diarrhea. Immunosuppression with prednisone and mycophenolate. LFTs on admission wnl. CT abdomen with stable post-surgical changes of transplant liver.  - Monitor MELD labs  - Mycophenolate 500mg BID  - Continue prednisone 5 mg  - Lamivudine 25 mg down from 100mg      # Nausea/Vomiting  # Diarrhea  # Anorexia  # Nutrition  Reports these symptoms resolved multiple days prior to admission after starting Zofran. Had 4 days of N/V/D. Suspect 2/2 to chemotherapy, post nasal drip from viral URI, and/or viral gastroenteritis. Patient has not been maintaining adequate hydration for the past few days.    - Zofran prn  - Renal diet    # Viral URI  Patient's caretaker had a URI recently. Productive cough for about 5 days. No fevers or leukocytosis currently. No consolidation on CXR. Viral panel positive for Rhinovirus. Received vanc + zosyn in ED.  - Blood cultures from ED NGTD    # CAD s/p 2 vessel CABG to LAD and OM  # HTN  # HLD  S/p CABG 7/2021. On metoprolol 12.5 mg daily, rosuvastatin 20 mg daily, and lisinopril 10 mg. Appears volume down on exam with dry mucus membranes and tachycardia with recent low PO intake.  - hold metoprolol and lisinopril due due to volume status and renal function. Resume once stable  - MAP goal >65  - consider new BP agent upon discharge     # Elevated troponin  Troponin elevated to 66 on admission, down to 46 on recheck. No ST segment changes on EKG, suspect a type 2 demand  ischemia vs decreased renal clearance.  - continue to monitor    # DMII  Last A1c 6.3. Well-controlled with outpatient regimen of basal insulin 34 units, carb count insulin, metformin, and empagliflozin.   - Hold PTA diabetes meds  - Basal insulin 15 units daily  - MDSS    # Thrombocytopenia, acute   PLT down to 106k from 178k on the day of admission. Likely dilutional. Ordered bilateral upper and lower extremity doppler US to assess for HIT: no evidence of DVT  - Monitor on CBC  - IPF ordered and normal     # Chronic anemia, macrocytic  Baseline hgb 11, stable here. Mixed picture with macrocytic and normocytic. Could be secondary to anemia of chronic disease vs medication adverse effects vs nutritional deficiencies. Does receive EPO per renal notes.  - Monitor hgb, transfuse <7    # Erythematous macular rash  Erythematous macular rash on the neck and chest. Patient states this developed today. Does not appear to be urticaria. It could be an adverse effect of sorafenib.  - monitor    # Bipolar disorder  Was previously on lithium, but no recent PTA meds.    # Elevated lipase  Lipase elevated to 797. Mild tenderness on exam with known peritoneal metastases and recent laparoscopic abdominal exploration. No apparent fat stranding on abdominal CT per our read, no concerns per discussion with radiology.   - monitor clinically       Diet: Renal Diet (dialysis)  Snacks/Supplements Adult: Other; PRN per pt request; With Meals    DVT Prophylaxis: Heparin SQ  Carroll Catheter: Not present  Lines: None       Cardiac Monitoring: ACTIVE order. Indication: Tachyarrhythmias, acute (48 hours)  Code Status: Full Code      Clinically Significant Risk Factors        # Hyperkalemia: Highest K = 5.6 mmol/L in last 2 days, will monitor as appropriate      # Anion Gap Metabolic Acidosis: Highest Anion Gap = 20 mmol/L in last 2 days, will monitor and treat as appropriate  # Hypoalbuminemia: Lowest albumin = 3.4 g/dL at 8/10/2023  9:51 PM,  "will monitor as appropriate       # Thrombocytopenia: Lowest platelets = 102 in last 2 days, will monitor for bleeding     # Hypertension: Noted on problem list        # Overweight: Estimated body mass index is 27.07 kg/m  as calculated from the following:    Height as of this encounter: 1.753 m (5' 9\").    Weight as of this encounter: 83.1 kg (183 lb 4.8 oz).  , PRESENT ON ADMISSION    # Severe Malnutrition: based on nutrition assessment, PRESENT ON ADMISSION        Disposition Plan      Expected Discharge Date: 08/14/2023,  3:00 PM                Yusef Soares Jr., MD  Medicine Service, [facility] TEAM 4  [facility]  Securely message with [employer] (more info)  Text page via [facility] Paging/Directory   See signed in provider for up to date coverage information  ______________________________________________________________________    Interval History   NAEO. Patient is doing well. Walking the hallways this morning. Appetite is good. No pain or other concerns. No shortness of breath, abdominal pain. He is looking forward to going for a walk today. No fevers or chills.    Physical Exam   Vital Signs: Temp: 98.9  F (37.2  C) Temp src: Oral BP: (!) 136/95 Pulse: 100   Resp: 18 SpO2: 100 % O2 Device: None (Room air)    Weight: 183 lbs 4.8 oz    General: Awake, NAD  HEENT: subconjunctival hemorrhage in right eye, dry MM with aphthous ulcer on the hard palate (stable)  Neuro: A&Ox3  Pulm/Resp: Nonlabored breathing, good air entry bilaterally.   CV: tachycardic, regular rhythm, no murmurs appreciated on auscultation,  Abdomen: Soft, mildly distended, no tenderness to palpation. Hypoactive bowel sounds  Incisions/Skin: erythematous macular rash on neck extending midline chest (stable), no rash on palms or soles.  Extremities: No lower extremity edema    Medical Decision Making     Data     I have personally reviewed the following data over the past 24 hrs:    6.0  \   12.3 (L)   / 104 " (L)     142 109 (H) 51.2 (H) /  186 (H)   4.5 19 (L) 2.31 (H) \     Ferritin:  N/A % Retic:  1.1 LDH:  N/A

## 2023-08-15 ENCOUNTER — APPOINTMENT (OUTPATIENT)
Dept: PHYSICAL THERAPY | Facility: CLINIC | Age: 58
DRG: 682 | End: 2023-08-15
Payer: MEDICARE

## 2023-08-15 LAB
ANION GAP SERPL CALCULATED.3IONS-SCNC: 15 MMOL/L (ref 7–15)
BACTERIA BLD CULT: NO GROWTH
BLAIMP ISLT/SPM QL: NOT DETECTED
BLAKPC ISLT/SPM QL: NOT DETECTED
BLAOXA-48 ISLT/SPM QL: NOT DETECTED
BLAVIM ISLT/SPM QL: NOT DETECTED
BUN SERPL-MCNC: 43.5 MG/DL (ref 8–23)
CALCIUM SERPL-MCNC: 8.7 MG/DL (ref 8.6–10)
CHLORIDE SERPL-SCNC: 106 MMOL/L (ref 98–107)
CREAT SERPL-MCNC: 1.87 MG/DL (ref 0.67–1.17)
DEPRECATED HCO3 PLAS-SCNC: 20 MMOL/L (ref 22–29)
ERYTHROCYTE [DISTWIDTH] IN BLOOD BY AUTOMATED COUNT: 12.9 % (ref 10–15)
FOLATE SERPL-MCNC: 23.5 NG/ML (ref 4.6–34.8)
GFR SERPL CREATININE-BSD FRML MDRD: 41 ML/MIN/1.73M2
GLUCOSE BLDC GLUCOMTR-MCNC: 144 MG/DL (ref 70–99)
GLUCOSE BLDC GLUCOMTR-MCNC: 151 MG/DL (ref 70–99)
GLUCOSE BLDC GLUCOMTR-MCNC: 152 MG/DL (ref 70–99)
GLUCOSE BLDC GLUCOMTR-MCNC: 154 MG/DL (ref 70–99)
GLUCOSE BLDC GLUCOMTR-MCNC: 192 MG/DL (ref 70–99)
GLUCOSE BLDC GLUCOMTR-MCNC: 199 MG/DL (ref 70–99)
GLUCOSE SERPL-MCNC: 141 MG/DL (ref 70–99)
HCT VFR BLD AUTO: 34.2 % (ref 40–53)
HGB BLD-MCNC: 11 G/DL (ref 13.3–17.7)
MAGNESIUM SERPL-MCNC: 1.9 MG/DL (ref 1.7–2.3)
MCH RBC QN AUTO: 30.7 PG (ref 26.5–33)
MCHC RBC AUTO-ENTMCNC: 32.2 G/DL (ref 31.5–36.5)
MCV RBC AUTO: 96 FL (ref 78–100)
NDM TARGET DNA: NOT DETECTED
PLATELET # BLD AUTO: 97 10E3/UL (ref 150–450)
POTASSIUM SERPL-SCNC: 4.3 MMOL/L (ref 3.4–5.3)
POTASSIUM SERPL-SCNC: 5.3 MMOL/L (ref 3.4–5.3)
RBC # BLD AUTO: 3.58 10E6/UL (ref 4.4–5.9)
SODIUM SERPL-SCNC: 141 MMOL/L (ref 136–145)
VIT B12 SERPL-MCNC: 491 PG/ML (ref 232–1245)
WBC # BLD AUTO: 4.4 10E3/UL (ref 4–11)

## 2023-08-15 PROCEDURE — 97116 GAIT TRAINING THERAPY: CPT | Mod: GP

## 2023-08-15 PROCEDURE — 99223 1ST HOSP IP/OBS HIGH 75: CPT | Mod: GC | Performed by: STUDENT IN AN ORGANIZED HEALTH CARE EDUCATION/TRAINING PROGRAM

## 2023-08-15 PROCEDURE — 82607 VITAMIN B-12: CPT

## 2023-08-15 PROCEDURE — 99207 PR CDG-CUT & PASTE-POTENTIAL IMPACT ON LEVEL: CPT | Performed by: STUDENT IN AN ORGANIZED HEALTH CARE EDUCATION/TRAINING PROGRAM

## 2023-08-15 PROCEDURE — 83735 ASSAY OF MAGNESIUM: CPT

## 2023-08-15 PROCEDURE — 250N000012 HC RX MED GY IP 250 OP 636 PS 637

## 2023-08-15 PROCEDURE — 250N000013 HC RX MED GY IP 250 OP 250 PS 637

## 2023-08-15 PROCEDURE — 99233 SBSQ HOSP IP/OBS HIGH 50: CPT | Mod: GC | Performed by: STUDENT IN AN ORGANIZED HEALTH CARE EDUCATION/TRAINING PROGRAM

## 2023-08-15 PROCEDURE — 84132 ASSAY OF SERUM POTASSIUM: CPT

## 2023-08-15 PROCEDURE — 87798 DETECT AGENT NOS DNA AMP: CPT | Performed by: INTERNAL MEDICINE

## 2023-08-15 PROCEDURE — 36415 COLL VENOUS BLD VENIPUNCTURE: CPT

## 2023-08-15 PROCEDURE — 82746 ASSAY OF FOLIC ACID SERUM: CPT

## 2023-08-15 PROCEDURE — 120N000011 HC R&B TRANSPLANT UMMC

## 2023-08-15 PROCEDURE — 80048 BASIC METABOLIC PNL TOTAL CA: CPT

## 2023-08-15 PROCEDURE — 85027 COMPLETE CBC AUTOMATED: CPT

## 2023-08-15 RX ORDER — MAGNESIUM OXIDE 400 MG/1
400 TABLET ORAL EVERY 4 HOURS
Status: COMPLETED | OUTPATIENT
Start: 2023-08-15 | End: 2023-08-15

## 2023-08-15 RX ADMIN — TAMSULOSIN HYDROCHLORIDE 0.4 MG: 0.4 CAPSULE ORAL at 08:36

## 2023-08-15 RX ADMIN — PANTOPRAZOLE SODIUM 40 MG: 40 TABLET, DELAYED RELEASE ORAL at 08:36

## 2023-08-15 RX ADMIN — MAGNESIUM OXIDE TAB 400 MG (241.3 MG ELEMENTAL MG) 400 MG: 400 (241.3 MG) TAB at 12:37

## 2023-08-15 RX ADMIN — APIXABAN 2.5 MG: 2.5 TABLET, FILM COATED ORAL at 21:07

## 2023-08-15 RX ADMIN — MYCOPHENOLATE MOFETIL 500 MG: 250 CAPSULE ORAL at 21:07

## 2023-08-15 RX ADMIN — Medication 50 MCG: at 15:50

## 2023-08-15 RX ADMIN — MAGNESIUM OXIDE TAB 400 MG (241.3 MG ELEMENTAL MG) 400 MG: 400 (241.3 MG) TAB at 16:52

## 2023-08-15 RX ADMIN — PREDNISONE 5 MG: 5 TABLET ORAL at 08:36

## 2023-08-15 RX ADMIN — INSULIN DEGLUDEC INJECTION 15 UNITS: 100 INJECTION, SOLUTION SUBCUTANEOUS at 21:08

## 2023-08-15 RX ADMIN — ASPIRIN 81 MG: 81 TABLET, COATED ORAL at 08:36

## 2023-08-15 RX ADMIN — MYCOPHENOLATE MOFETIL 500 MG: 250 CAPSULE ORAL at 08:36

## 2023-08-15 RX ADMIN — LAMIVUDINE 25 MG: 10 SOLUTION ORAL at 08:38

## 2023-08-15 ASSESSMENT — ACTIVITIES OF DAILY LIVING (ADL)
ADLS_ACUITY_SCORE: 22

## 2023-08-15 NOTE — PLAN OF CARE
"Goal Outcome Evaluation:  /86 (BP Location: Left arm)   Pulse 112   Temp 98  F (36.7  C) (Oral)   Resp 18   Ht 1.753 m (5' 9\")   Wt 83.1 kg (183 lb 4.8 oz)   SpO2 100%   BMI 27.07 kg/m      Shift: 4195-7317  Isolation Status: Droplet precautions   VS: Tachycardic at times, all other vitals stable, room air, afebrile  Neuro: Alert and oriented x4  BG: ACHS sugars 201, 134  Labs: Awaiting AM labs   Pain/Nausea: Denies pain or nausea   Diet: Renal diet   IV Access: PIV x1 saline locked   GI/: Voiding adequately, LBM 8/13  Skin: Old HD line dressing removed, site SUDHA, no other new skin issues   Mobility: Up ab karely   Plan: Continue with POC and notify team with any changes   "

## 2023-08-15 NOTE — PHARMACY-CONSULT NOTE
Pharmacy Consult Note    Pharmacist consult to assess medications for thrombocytopenia    All current medications reviewed. The following medications have been identified:  - Aspirin: frequency not defined - PTA medication  - Heparin: related to HIT  - Lamivudine: 4% - PTA medication  - Mycophenolate: 24-38% - PTA medication  - Pantoprazole: 2% - PTA medication     Pily Elmore PharmAndrewD., BCPS

## 2023-08-15 NOTE — PROGRESS NOTES
"/78 (BP Location: Right arm)   Pulse 108   Temp 97.5  F (36.4  C) (Oral)   Resp 16   Ht 1.753 m (5' 9\")   Wt 83.1 kg (183 lb 4.8 oz)   SpO2 100%   BMI 27.07 kg/m        [id number]  Tachy, OVSS on RA. A+Ox4. ACHS, 152, 199 and 192. Insulin given per sliding scale. Potassium 4.3 with am labs. Plan for recheck at 1900. RN managed mag 1.9, 2 doses of PO replacement given. Recheck in am. Back pain controlled with heating pad. Renal diet, good appetite. PIV SL. 1 BM this shift, pt denies nausea. Voiding WOD. Skin intact. Up ad karely, pt ambulated in halls. Oncology team spoke with pt today. Will continue to monitor and notify team with changes.   "

## 2023-08-15 NOTE — CONSULTS
Solid Tumor Oncology  Consult Note   Date of Service: 08/15/2023    Patient: Frandy Workman  MRN: [id number]  Admission Date: 8/10/2023  Hospital Day # 5  Cancer Diagnosis: metastatic HCC  Primary Outpatient Oncologist: Eagle Villarreal  Current Treatment Plan: Sorafenib     Reason for Consult: Thrombocytopenia      History of Present Illness:    Frandy Workman is a 58 year old year old male with a history of ESTRADA cirrhosis c/b HCC s/p liver transplant in 2019 now with new peritoneal metastases on sorafenib, CAD s/p CABG, CKD stage 3, T2DM who was admitted on 8/10/23 for emergent dialysis.     The patient was experiencing nausea, vomiting, diarrhea for four days prior to the day of admission. He was also experiencing myalgias and chills. He was found to have acute renal failure and hyperkalemia, for which he underwent emergency dialysis. His kidney function is now improved and he is making urine.    Oncology was consulted for thrombocytopenia. Prior to 2021, he had thrombocytopenia that was attributed to a combination of cirrhosis and immunosuppressive therapies. More recently in [id number], his platelet counts have largely been stable. His platelet count has been downtrending since 8/10, when it was 164. Most recently his platelet count is 97 today. He does think he is bruising more easily and has some bruises on his arms where he has had IV blood draws, but otherwise no bruising or bleeding. He has received heparin products in the past, most recently on 7/13/23. He has also been receiving heparin for DVT prophylaxis since he was admitted on 8/10. He was also recently started on sorafenib for his metastatic HCC on 7/29, though it was subsequently stopped on 8/10 due to his nausea and vomiting.     Oncologic History:  The patient has a history of ESTRADA cirrhosis complicated by HCC in 2018. He had a liver transplant in 2019. On routine surveillance June 2023, he was found to have new peritoneal masses. Laparoscopy  confirmed metastatic poorly-differentiated HCC. He was started on sorafenib on 7/29/23.     Assessment & Plan:   Frandy Workman is a 58 year old year old male with a history of ESTRADA cirrhosis c/b HCC s/p liver transplant in 2019 now with new peritoneal metastases on sorafenib, CAD s/p CABG, CKD stage 3, T2DM who was admitted on 8/10/23 for emergent dialysis. Oncology was consulted for his thrombocytopenia.     Several possible etiologies for his thrombocytopenia, including sorafenib therapy, HIT, drug-induced thrombocytopenia, ITP, DIC. Sorafenib is known to cause thrombocytopenia in up to 45% of patients, so there is certainly a high likelihood that this is the cause. HIT is also possible; patient has received heparin in the past, including in the past month. He has been receiving heparin on this admission as well for DVT prophylaxis, though his platelet count began decreasing on the day he started subQ heparin. His 4T score is 4. Upper and lower DVT ultrasounds were negative. Could consider drug-induced thrombocytopenia as well, though the patient was not started on any new medications that would cause thrombocytopenia. He was given one dose of Zosyn in the ED on 8/10, but has not received any since then. Low suspicion for ITP, TTP, or DIC at this time.     Recommendations:   - send HIT assay to rule it out but do not need to start treatment with argatroban at this time  - hold heparin; can use Eliquis for DVT prophylaxis  - order peripheral smear  - continue to monitor with daily CBC    Oncology will continue to follow this patient. Please do not hesitate to page with any questions or concerns.    Patient was seen and recommendations discussed with attending physician, Dr. Vasquez.    Guerline Franco MD  Internal Medicine PGY2    ---------------------------------------      Review of Systems:  A comprehensive ROS was performed and found to be negative or non-contributory with the exception of that noted in  the HPI above.    Past Medical History:  Past Medical History:   Diagnosis Date    Anemia 2013    Arthritis     BPH (benign prostatic hyperplasia)     CAD (coronary artery disease) 04/01/2019    Cholelithiasis     Conductive hearing loss 08/16/2017    Depressive disorder 1986    Suffer effects throughout life    Gastroesophageal reflux disease 12/01/2014    HCC (hepatocellular carcinoma) (H) 01/22/2019    History of diabetic retinopathy 07/2018    HTN (hypertension) 11/20/2019    Hyperlipidemia     Liver cirrhosis secondary to ESTRADA (H)     Liver transplanted (H) 11/11/2019    Portal vein thrombosis 04/11/2019    Type II diabetes mellitus (H)        Past Surgical History:  Past Surgical History:   Procedure Laterality Date    BYPASS GRAFT ARTERY CORONARY N/A 7/14/2021    Procedure: median sternotomy, on cardiopulmonary bypass, CORONARY ARTERY BYPASS GRAFT (CABG) x2 with left greater saphenous vein endoscopic harvest and left internal mammery artery harvest;  Surgeon: Tom Zapata MD;  Location: [facility]    COLONOSCOPY      2015    COLONOSCOPY N/A 12/6/2019    Procedure: COLONOSCOPY, WITH POLYPECTOMY AND BIOPSY;  Surgeon: Adam Morton MD;  [facility]    CV CENTRAL VENOUS CATHETER PLACEMENT N/A 7/12/2021    Procedure: Central Venous Catheter Placement;  Surgeon: Fermin Polanco MD;  Location:  [facility]    CV CORONARY ANGIOGRAM N/A 7/12/2021    Procedure: Coronary Angiogram;  Surgeon: Fermin Polanco MD;  Location:  [facility]    CV HEART CATHETERIZATION WITH POSSIBLE INTERVENTION N/A 2/26/2019    Procedure: CORS;  Surgeon: Jagdish Hoyt MD;  Location:  [facility]    CV INTRA AORTIC BALLOON N/A 7/12/2021    Procedure: Intra Aortic Balloon Pump Insertion;  Surgeon: Fermin Polanco MD;  Location:  [facility]    ESOPHAGOSCOPY, GASTROSCOPY, DUODENOSCOPY (EGD), COMBINED N/A 11/17/2016     Procedure: COMBINED ESOPHAGOSCOPY, GASTROSCOPY, DUODENOSCOPY (EGD);  Surgeon: Santi Rosas MD;  [facility]: [facility]    ESOPHAGOSCOPY, GASTROSCOPY, DUODENOSCOPY (EGD), COMBINED N/A 11/17/2017    Procedure: COMBINED ESOPHAGOSCOPY, GASTROSCOPY, DUODENOSCOPY (EGD);  EGD;  Surgeon: Santi Rosas MD;  [facility]: [facility]    ESOPHAGOSCOPY, GASTROSCOPY, DUODENOSCOPY (EGD), COMBINED N/A 12/28/2018    Procedure: EGD;  Surgeon: Santi Rosas MD;  [facility]    ESOPHAGOSCOPY, GASTROSCOPY, DUODENOSCOPY (EGD), COMBINED N/A 12/6/2019    Procedure: ESOPHAGOGASTRODUODENOSCOPY, WITH BIOPSY;  Surgeon: Adam Morton MD;  [facility]    ESOPHAGOSCOPY, GASTROSCOPY, DUODENOSCOPY (EGD), COMBINED N/A 2/13/2020    Procedure: ESOPHAGOGASTRODUODENOSCOPY (EGD);  Surgeon: Santi Rosas MD;  Location:  GI    EXCISE MASS ABDOMEN N/A 7/13/2023    Procedure: Laparoscopic lysis of adhesions, laparoscopic resection of abdominal mass;  Surgeon: Ruiz Chu MD;  Location:  OR    HEAD & NECK SURGERY      12/2017 at [facility].     IMPLANT GOLD WEIGHT EYELID Right 11/16/2017    Procedure: IMPLANT WEIGHT EYELID;  Right Upper Eyelid Weight, right tarsal strip lower eyelid;  Surgeon: Milana Malave MD;  [facility]    IR CHEMO EMBOLIZATION  1/22/2019    KNEE SURGERY Left     ORTHOPEDIC SURGERY      PAROTIDECTOMY, RADICAL NECK DISSECTION Right 11/2/2017    Procedure: PAROTIDECTOMY, RADICAL NECK DISSECTION;  Right Superfacial Parotidectomy , Facial nerve repair. with [facility] facial nerve monitor.;  Surgeon: Asiya Morgan MD;  [facility]    PHACOEMULSIFICATION CLEAR CORNEA WITH STANDARD INTRAOCULAR LENS IMPLANT Right 1/24/2023    Procedure: PHACOEMULSIFICATION, COMPLEX CATARACT, WITH INTRAOCULAR LENS IMPLANT WITH TRYPAN RIGHT EYE;  Surgeon: Enriqueta Martin MD;  [facility]: [facility]    PHACOEMULSIFICATION WITH STANDARD INTRAOCULAR LENS IMPLANT Left 1/3/2023    Procedure:  PHACOEMULSIFICATION, COMPLEX CATARACT, WITH STANDARD INTRAOCULAR LENS IMPLANT INSERTION LEFT EYE;  Surgeon: Enriqueta Martin MD;  Location: [facility]    PICC INSERTION Left 2017    4fr SL BioFlo PICC, 44cm in the L basilic vein w/ tip in the low SVC    RETURN LIVER TRANSPLANT N/A 2019    Procedure: Exploratory laparotomy, hematoma evacuation, abdominal washout;  Surgeon: Александр Ramos MD;  [facility]    TRANSPLANT LIVER RECIPIENT  DONOR N/A 2019    Procedure: TRANSPLANT, LIVER, RECIPIENT,  DONOR;  Surgeon: Александр Ramos MD;  [facility]: [facility]    VASCULAR SURGERY         Social History:  Social History     Socioeconomic History    Marital status:     Highest education level: Bachelor's degree (e.g., BA, AB, BS)   Tobacco Use    Smoking status: Former     Packs/day: 6.00     Years: 30.00     Pack years: 180.00     Types: Cigars, Cigarettes     Start date: 2016     Quit date: 10/25/2017     Years since quittin.8     Passive exposure: Past    Smokeless tobacco: Former     Types: Chew     Quit date: 10/31/2017    Tobacco comments:     1 tin per week   Vaping Use    Vaping Use: Never used   Substance and Sexual Activity    Alcohol use: No     Alcohol/week: 0.0 standard drinks of alcohol     Comment: quit 1996    Drug use: No    Sexual activity: Not Currently     Partners: Female     Birth control/protection: Condom   Other Topics Concern    Parent/sibling w/ CABG, MI or angioplasty before 65F 55M? Yes   Social History Narrative    Prior , [facility]     Social Determinants of Health     Financial Resource Strain: Low Risk  (10/13/2020)    Overall Financial Resource Strain (CARDIA)     Difficulty of Paying Living Expenses: Not very hard   Food Insecurity: No Food Insecurity (10/13/2020)    Hunger Vital Sign     Worried About Running Out of Food in the Last Year: Never true     Ran Out of Food in the Last Year: Never true   Transportation Needs:  No Transportation Needs (10/13/2020)    PRAPARE - Transportation     Lack of Transportation (Medical): No     Lack of Transportation (Non-Medical): No   Physical Activity: Insufficiently Active (10/13/2020)    Exercise Vital Sign     Days of Exercise per Week: 1 day     Minutes of Exercise per Session: 60 min   Stress: Stress Concern Present (10/13/2020)    [employer] [place] [employer] - Occupational Stress Questionnaire     Feeling of Stress : To some extent   Social Connections: Socially Isolated (10/13/2020)    Social Connection and Isolation Panel [NHANES]     Frequency of Communication with Friends and Family: Once a week     Frequency of Social Gatherings with Friends and Family: Once a week     Attends Confucianism Services: Never     Active Member of Clubs or Organizations: No     Attends Club or Organization Meetings: Never     Marital Status:    Intimate Partner Violence: Not At Risk (6/28/2023)    Humiliation, Afraid, Rape, and Kick questionnaire     Fear of Current or Ex-Partner: No     Emotionally Abused: No     Physically Abused: No     Sexually Abused: No   Housing Stability: Low Risk  (10/13/2020)    Housing Stability Vital Sign     Unable to Pay for Housing in the Last Year: No     Number of Places Lived in the Last Year: 2     Unstable Housing in the Last Year: No        Family History:  Family History   Problem Relation Age of Onset    Skin Cancer Mother     Cancer Mother         mastectomy    Diabetes Mother     Cerebrovascular Disease Mother     Thyroid Disease Mother     Depression Mother     Colon Cancer Father 60    Pancreatic Cancer Father 60    Prostate Cancer Father     Macular Degeneration Father     Glaucoma Father     Skin Cancer Father     Thyroid Disease Sister     Depression Sister     Asthma Sister     Sleep Apnea Brother     Colorectal Cancer Maternal Grandmother     Cancer Maternal Grandmother     Substance Abuse Maternal Grandmother         Alcohol    Prostate  Cancer Maternal Grandfather     Substance Abuse Maternal Grandfather         Alcohol    Colorectal Cancer Paternal Grandmother     Liver Disease No family hx of     Melanoma No family hx of     Anesthesia Reaction No family hx of     Deep Vein Thrombosis (DVT) No family hx of        Outpatient Medications:  No current facility-administered medications on file prior to encounter.  Alcohol Swabs (ALCOHOL PREP) 70 % PADS, Use for glucose testing 4x daily  and insulin administration 4x daily.  ammonium lactate (AMLACTIN) 12 % external cream, Apply topically daily as needed for dry skin (on feet)  aspirin (SM ASPIRIN ADULT LOW STRENGTH) 81 MG EC tablet, Take 2 tablets (162 mg) by mouth daily  BD VIKTORIA U/F 32G X 4 MM insulin pen needle, Use 5 per day  benzoyl peroxide 5 % external liquid, Use topically in showers as a body wash  Continuous Blood Gluc Sensor (FREESTYLE CLAUDIA 2 SENSOR) [facility], 1 each See Admin Instructions Change every 14 days.  empagliflozin (JARDIANCE) 10 MG TABS tablet, Take 1 tablet (10 mg) by mouth daily  insulin aspart (NOVOLOG PEN) 100 UNIT/ML pen, Inject 5-10 Units Subcutaneous 4 times daily (with meals and nightly) 1unit : 8 g carb before meals.  Also add 1 unit : 50 mg/dl >180 before meals and at bedtime.  insulin degludec (TRESIBA FLEXTOUCH) 100 UNIT/ML pen, Inject 34 units subcutaneous once daily  ketoconazole (NIZORAL) 2 % external shampoo, Apply thin layer topically to scalp in shower (leave on 5 min prior to rinse); may also use as a body wash  lamiVUDine (EPIVIR) 100 MG tablet, Take 1 tablet (100 mg) by mouth daily  lisinopril (ZESTRIL) 10 MG tablet, Take 1 tablet (10 mg) by mouth daily  metFORMIN (GLUCOPHAGE XR) 500 MG 24 hr tablet, Take 500 mg by mouth daily  metoprolol succinate ER (TOPROL XL) 25 MG 24 hr tablet, Take 0.5 tablets (12.5 mg) by mouth daily  Multiple Vitamin (TAB-A-GLADIS) TABS, TAKE ONE TABLET BY MOUTH ONCE DAILY  mycophenolate (GENERIC EQUIVALENT) 250 MG capsule, Take 2  "capsules (500 mg) by mouth every 12 hours  ondansetron (ZOFRAN ODT) 8 MG ODT tab, Take 1 tablet (8 mg) by mouth every 8 hours as needed for nausea  pantoprazole (PROTONIX) 40 MG EC tablet, Take 1 tablet (40 mg) by mouth daily before breakfast  predniSONE (DELTASONE) 5 MG tablet, Take 1 tablet (5 mg) by mouth daily  rosuvastatin (CRESTOR) 20 MG tablet, Take 1 tablet (20 mg) by mouth daily  tamsulosin (FLOMAX) 0.4 MG capsule, Take 1 capsule (0.4 mg) by mouth daily  Vitamin D3 50 mcg (2000 units) tablet, Take 1 tablet (50 mcg) by mouth daily         Physical Exam:    Blood pressure 131/78, pulse 108, temperature 97.5  F (36.4  C), temperature source Oral, resp. rate 16, height 1.753 m (5' 9\"), weight 83.1 kg (183 lb 4.8 oz), SpO2 100 %.  General: alert and cooperative, sitting up in bed, in no acute distress  HEENT: sclera anicteric, EOMI  Neck: supple, normal ROM  Resp: normal respiratory effort on ambient air  MSK: no lower extremity edema  Skin: bruises in various stages of healing on arms, otherwise no lesions or rashes  Neuro: Alert and interactive, moves all extremities equally, no focal deficits    Labs & Studies: I personally reviewed the following studies:  ROUTINE LABS (Last four results):  [facility]  Recent Labs   Lab 08/15/23  1240 08/15/23  0853 08/15/23  0505 08/15/23  0259 08/14/23 2129 08/14/23  1844 08/14/23  0727 08/14/23  0653 08/14/23  0239 08/14/23  0107 08/13/23  0847 08/13/23  0814 08/12/23  2150 08/12/23  1833 08/12/23  0746 08/12/23  0702 08/10/23  2159 08/10/23  2151 08/10/23  1844 08/10/23  1833 08/10/23  1024   NA  --   --  141  --   --   --   --  142  --   --   --  145  --  141  --  147*   < > 141   < > 138 139   POTASSIUM  --   --  4.3  --   --  5.1  --  4.5  --  4.7   < > 4.5  --  5.2   < > 5.4*   < > 5.9*   < > 8.0* 6.3*   CHLORIDE  --   --  106  --   --   --   --  109*  --   --   --  110*  --  105  --  111*   < > 116*  --  112* 112*   CO2  --   --  20*  --   --   --   --  19*  --   --   --  " 20*  --  16*  --  17*   < > 9*  --  8* 9*   ANIONGAP  --   --  15  --   --   --   --  14  --   --   --  15  --  20*  --  19*   < > 16*  --  18* 18*   * 152* 141* 154*   < >  --    < > 115*   < >  --    < > 124*   < > 178*   < > 114*   < > 113*   < > 130* 180*   BUN  --   --  43.5*  --   --   --   --  51.2*  --   --   --  55.8*  --  63.6*  --  65.8*   < > 105.0*  --  117.0* 107.0*   CR  --   --  1.87*  --   --   --   --  2.31*  --   --   --  2.82*  --  3.27*  --  3.78*   < > 6.77*  --  7.38* 6.50*   GFRESTIMATED  --   --  41*  --   --   --   --  32*  --   --   --  25*  --  21*  --  18*   < > 9*  --  8* 9*   DEBORAH  --   --  8.7  --   --   --   --  8.3*  --   --   --  8.4*  --  8.5*  --  8.4*   < > 7.9*  --  8.6 9.1   MAG  --   --  1.9  --   --   --   --  1.9  --   --   --  1.9  --   --   --  2.0   < > 2.1  --  2.3 2.3   PHOS  --   --   --   --   --   --   --   --   --   --   --   --   --   --   --   --   --   --   --   --  6.0*   PROTTOTAL  --   --   --   --   --   --   --   --   --   --   --   --   --   --   --   --   --  6.0*  --  7.3 7.6   ALBUMIN  --   --   --   --   --   --   --   --   --   --   --   --   --   --   --   --   --  3.4*  --  4.3 4.4   BILITOTAL  --   --   --   --   --   --   --   --   --   --   --   --   --   --   --   --   --  0.2  --  0.3 0.4   ALKPHOS  --   --   --   --   --   --   --   --   --   --   --   --   --   --   --   --   --  84  --  108 110   AST  --   --   --   --   --   --   --   --   --   --   --   --   --   --   --   --   --  25  --  22 24   ALT  --   --   --   --   --   --   --   --   --   --   --   --   --   --   --   --   --  16  --  26 27    < > = values in this interval not displayed.     CBC  Recent Labs   Lab 08/15/23  0505 08/14/23  1043 08/14/23  0653 08/13/23  0814   WBC 4.4 6.0 4.6 4.5   RBC 3.58* 3.92* 3.49* 3.88*   HGB 11.0* 12.3* 10.8* 11.8*   HCT 34.2* 37.9* 33.7* 37.3*   MCV 96 97 97 96   MCH 30.7 31.4 30.9 30.4   MCHC 32.2 32.5 32.0 31.6   RDW 12.9 13.2 13.2  13.4   PLT 97* 104* 102* 110*     INR  Recent Labs   Lab 08/10/23  1833   INR 1.16*

## 2023-08-15 NOTE — PROGRESS NOTES
[facility]    Medicine Progress Note - Medicine Service, YURIDIA TEAM 4    Date of Admission:  8/10/2023    Assessment & Plan   Frandy Workman is a 58 year old male admitted on 8/10/2023 who is being transferred out of the ICU on 8/11/2023. He has a history of CAD s/p CABG in 2021, DMII, CKD, and cirrhosis with HCC now s/p liver transplant in 2019 who has since developed new peritoneal metastases and was started on Sorafenib on 7/29/23. He was admitted to the ICU on 8/10/2023 for emergent dialysis for hyperkalemia (8) with EKG changes 2/2 JERONIMO on CKD stage III. K now improved after shifting in the ED and one round of HD.     Today:  - Hematology consulted for thrombocytopenia  - K recheck at 1900  - B12 and folate check  - 4T score of 3-4  - Pharmacy consulted for assessing meds that could contribute to thrombocytopenia    # Hyperkalemia, resolved  # Mild hypernatremia; resolved  Potassium 8 on admission. EKG with peaked T waves, Qtc 422. S/p calcium gluconate, insulin/dextrose, and now one round of HD clearance. Likely 2/2 renal failiure and metabolic acidosis. EKG upon arrival in the ICU with resolution of peaked T waves. Mild hypernatremia 8/12, discussed increasing p.o. fluid intake with patient. Now resolved.  - Nephrology consulted, signed off  - Dialysis line removed on 8/13  - Monitor on labs  - K check at 1900     # Hyperchloremic non-anion gap metabolic acidosis; resolved  # Anion gap metabolic acidosis; resolved  VBG with pH 7.07, bicarb 9, and anion gap 18 on admission. Delta delta ratio was 0.4. Likely a combination of anion gap and non-anion gap metabolic acidosis. Anion gap acidosis 2/2 starvation ketoacidosis and renal failureA. NAGMA 2/2 bicarb losses due to recent diarrhea. Elevated beta-hydroxybutyrate  - Monitor     # JERONIMO on CKD stage III, intrarenal; improving  # Suspect ATN  # Diabetic nephropathy  Baseline Cr at 2, elevated to 7.38 on admission  decreasing daily.   - Strict I/Os  - Monitor on labs    # Hypocalcemia  # Hypomagnesemia  - Replace as needed    # ESTRADA cirrhosis c/b portal vein thrombosis  # s/p Liver transplant 11/2019  # HCC s/p TACE 1/2019, now recurrent w/ mets to peritoneum recently initiated on Sorafenib  # HBcAg +ve donor  # Immunosuppressed  # Hx of Esophageal varices  He had a liver transplant in 2019 for HCC and cirrhosis. In June 2023, found to have new biopsy proven peritoneal metastases of HCC. Started on oral chemotherapy (sorafenib 400mg BID) on 7/29/23, held by oncology due to vomiting and diarrhea. Immunosuppression with prednisone and mycophenolate. LFTs on admission wnl. CT abdomen with stable post-surgical changes of transplant liver.  - Monitor MELD labs  - Mycophenolate 500mg BID  - Continue prednisone 5 mg  - Lamivudine 25 mg down from 100mg      # Nausea/Vomiting  # Diarrhea  # Anorexia  # Nutrition  Reports these symptoms resolved multiple days prior to admission after starting Zofran. Had 4 days of N/V/D. Suspect 2/2 to chemotherapy, post nasal drip from viral URI, and/or viral gastroenteritis. Patient has not been maintaining adequate hydration for the past few days.    - Zofran prn  - Renal diet    # Viral URI  Patient's caretaker had a URI recently. Productive cough for about 5 days. No fevers or leukocytosis currently. No consolidation on CXR. Viral panel positive for Rhinovirus. Received vanc + zosyn in ED.  - Blood cultures from ED NGTD    # CAD s/p 2 vessel CABG to LAD and OM  # HTN  # HLD  S/p CABG 7/2021. On metoprolol 12.5 mg daily, rosuvastatin 20 mg daily, and lisinopril 10 mg. Appears volume down on exam with dry mucus membranes and tachycardia with recent low PO intake.  - hold metoprolol and lisinopril due due to renal function. Resume once stable  - MAP goal >65  - consider new BP agent upon discharge     # Elevated troponin  Troponin elevated to 66 on admission, down to 46 on recheck. No ST segment  changes on EKG, suspect a type 2 demand ischemia vs decreased renal clearance.  - continue to monitor    # DMII  Last A1c 6.3. Well-controlled with outpatient regimen of basal insulin 34 units, carb count insulin, metformin, and empagliflozin.   - Hold PTA diabetes meds  - Basal insulin 15 units daily  - MDSS    # Thrombocytopenia, acute   PLT down to 106k from 178k on the day of admission. Likely dilutional. Ordered bilateral upper and lower extremity doppler US to assess for HIT: no evidence of DVT  - Monitor on CBC  - IPF ordered and normal  - Hematology consulted     # Chronic anemia, macrocytic  Baseline hgb 11, stable here. Mixed picture with macrocytic and normocytic. Could be secondary to anemia of chronic disease vs medication adverse effects vs nutritional deficiencies. Does receive EPO per renal notes.  - Monitor hgb, transfuse <7    # Erythematous macular rash  Erythematous macular rash on the neck and chest. Patient states this developed today. Does not appear to be urticaria. It could be an adverse effect of sorafenib.  - monitor    # Bipolar disorder  Was previously on lithium, but no recent PTA meds.    # Elevated lipase  Lipase elevated to 797. Mild tenderness on exam with known peritoneal metastases and recent laparoscopic abdominal exploration. No apparent fat stranding on abdominal CT per our read, no concerns per discussion with radiology.   - monitor clinically       Diet: Renal Diet (dialysis)  Snacks/Supplements Adult: Other; PRN per pt request; With Meals    DVT Prophylaxis: Heparin SQ  Carroll Catheter: Not present  Lines: None       Cardiac Monitoring: None  Code Status: Full Code      Clinically Significant Risk Factors        # Hyperkalemia: Highest K = 5.6 mmol/L in last 2 days, will monitor as appropriate       # Hypoalbuminemia: Lowest albumin = 3.4 g/dL at 8/10/2023  9:51 PM, will monitor as appropriate       # Thrombocytopenia: Lowest platelets = 97 in last 2 days, will monitor for  "bleeding     # Hypertension: Noted on problem list        # Overweight: Estimated body mass index is 27.07 kg/m  as calculated from the following:    Height as of this encounter: 1.753 m (5' 9\").    Weight as of this encounter: 83.1 kg (183 lb 4.8 oz).       # Severe Malnutrition: based on nutrition assessment         Disposition Plan      Expected Discharge Date: 08/15/2023        Discharge Comments: will check with team bernadette Soares Jr., MD  Medicine Service, YURIDIA TEAM 4  [facility]  Securely message with [employer] (more info)  Text page via [facility] Paging/Directory   See signed in provider for up to date coverage information  ______________________________________________________________________    Interval History   NAEO. Patient is doing well. About to walk the hallways this morning. Appetite is good. No pain or other concerns. No shortness of breath, abdominal pain. He is looking forward to going for a walk today. No fevers or chills.    Physical Exam   Vital Signs: Temp: 97.5  F (36.4  C) Temp src: Oral BP: 131/78 Pulse: 108   Resp: 16 SpO2: 100 % O2 Device: None (Room air)    Weight: 183 lbs 4.8 oz    General: Awake, NAD  HEENT: subconjunctival hemorrhage in right eye, dry MM with aphthous ulcer on the hard palate (stable)  Neuro: A&Ox3  Pulm/Resp: Nonlabored breathing, good air entry bilaterally.   CV: regular rhythm, no murmurs appreciated on auscultation,  Abdomen: Soft, mildly distended, no tenderness to palpation.   Incisions/Skin: erythematous macular rash on neck extending midline chest (stable), no rash on palms or soles.  Extremities: No lower extremity edema    Medical Decision Making     Data     I have personally reviewed the following data over the past 24 hrs:    4.4  \   11.0 (L)   / 97 (L)     141 106 43.5 (H) /  199 (H)   4.3 20 (L) 1.87 (H) \       "

## 2023-08-16 VITALS
TEMPERATURE: 98.2 F | DIASTOLIC BLOOD PRESSURE: 82 MMHG | HEART RATE: 80 BPM | SYSTOLIC BLOOD PRESSURE: 125 MMHG | WEIGHT: 183.3 LBS | BODY MASS INDEX: 27.15 KG/M2 | HEIGHT: 69 IN | OXYGEN SATURATION: 98 % | RESPIRATION RATE: 16 BRPM

## 2023-08-16 LAB
ANION GAP SERPL CALCULATED.3IONS-SCNC: 14 MMOL/L (ref 7–15)
BACTERIA BLD CULT: NO GROWTH
BUN SERPL-MCNC: 45.1 MG/DL (ref 8–23)
CALCIUM SERPL-MCNC: 8.6 MG/DL (ref 8.6–10)
CHLORIDE SERPL-SCNC: 107 MMOL/L (ref 98–107)
CREAT SERPL-MCNC: 1.84 MG/DL (ref 0.67–1.17)
DEPRECATED HCO3 PLAS-SCNC: 17 MMOL/L (ref 22–29)
ERYTHROCYTE [DISTWIDTH] IN BLOOD BY AUTOMATED COUNT: 12.9 % (ref 10–15)
GFR SERPL CREATININE-BSD FRML MDRD: 42 ML/MIN/1.73M2
GLUCOSE BLDC GLUCOMTR-MCNC: 185 MG/DL (ref 70–99)
GLUCOSE BLDC GLUCOMTR-MCNC: 195 MG/DL (ref 70–99)
GLUCOSE SERPL-MCNC: 204 MG/DL (ref 70–99)
HCT VFR BLD AUTO: 31.4 % (ref 40–53)
HGB BLD-MCNC: 10.3 G/DL (ref 13.3–17.7)
HOLD SPECIMEN HIT: NORMAL
MAGNESIUM SERPL-MCNC: 2 MG/DL (ref 1.7–2.3)
MCH RBC QN AUTO: 31.2 PG (ref 26.5–33)
MCHC RBC AUTO-ENTMCNC: 32.8 G/DL (ref 31.5–36.5)
MCV RBC AUTO: 95 FL (ref 78–100)
PF4 HEPARIN CMPLX AB SER QL: NEGATIVE
PLATELET # BLD AUTO: 97 10E3/UL (ref 150–450)
POTASSIUM SERPL-SCNC: 4.6 MMOL/L (ref 3.4–5.3)
RBC # BLD AUTO: 3.3 10E6/UL (ref 4.4–5.9)
SODIUM SERPL-SCNC: 138 MMOL/L (ref 136–145)
WBC # BLD AUTO: 4 10E3/UL (ref 4–11)

## 2023-08-16 PROCEDURE — 85027 COMPLETE CBC AUTOMATED: CPT

## 2023-08-16 PROCEDURE — 250N000013 HC RX MED GY IP 250 OP 250 PS 637

## 2023-08-16 PROCEDURE — 99232 SBSQ HOSP IP/OBS MODERATE 35: CPT | Performed by: INTERNAL MEDICINE

## 2023-08-16 PROCEDURE — 83735 ASSAY OF MAGNESIUM: CPT

## 2023-08-16 PROCEDURE — 36415 COLL VENOUS BLD VENIPUNCTURE: CPT

## 2023-08-16 PROCEDURE — 80048 BASIC METABOLIC PNL TOTAL CA: CPT

## 2023-08-16 PROCEDURE — 250N000012 HC RX MED GY IP 250 OP 636 PS 637

## 2023-08-16 PROCEDURE — 86022 PLATELET ANTIBODIES: CPT

## 2023-08-16 PROCEDURE — 99239 HOSP IP/OBS DSCHRG MGMT >30: CPT | Mod: GC | Performed by: STUDENT IN AN ORGANIZED HEALTH CARE EDUCATION/TRAINING PROGRAM

## 2023-08-16 RX ORDER — MAGNESIUM OXIDE 400 MG/1
400 TABLET ORAL EVERY 4 HOURS
Status: DISCONTINUED | OUTPATIENT
Start: 2023-08-16 | End: 2023-08-16 | Stop reason: HOSPADM

## 2023-08-16 RX ORDER — LAMIVUDINE 10 MG/ML
50 SOLUTION ORAL DAILY
Qty: 240 ML | Refills: 0 | Status: SHIPPED | OUTPATIENT
Start: 2023-08-17 | End: 2023-09-13

## 2023-08-16 RX ORDER — LAMIVUDINE 10 MG/ML
50 SOLUTION ORAL DAILY
Status: DISCONTINUED | OUTPATIENT
Start: 2023-08-17 | End: 2023-08-16 | Stop reason: HOSPADM

## 2023-08-16 RX ADMIN — APIXABAN 2.5 MG: 2.5 TABLET, FILM COATED ORAL at 10:04

## 2023-08-16 RX ADMIN — PANTOPRAZOLE SODIUM 40 MG: 40 TABLET, DELAYED RELEASE ORAL at 07:42

## 2023-08-16 RX ADMIN — ASPIRIN 81 MG: 81 TABLET, COATED ORAL at 07:42

## 2023-08-16 RX ADMIN — MYCOPHENOLATE MOFETIL 500 MG: 250 CAPSULE ORAL at 10:04

## 2023-08-16 RX ADMIN — LAMIVUDINE 25 MG: 10 SOLUTION ORAL at 07:43

## 2023-08-16 RX ADMIN — MAGNESIUM OXIDE TAB 400 MG (241.3 MG ELEMENTAL MG) 400 MG: 400 (241.3 MG) TAB at 11:01

## 2023-08-16 RX ADMIN — TAMSULOSIN HYDROCHLORIDE 0.4 MG: 0.4 CAPSULE ORAL at 07:42

## 2023-08-16 RX ADMIN — PREDNISONE 5 MG: 5 TABLET ORAL at 07:42

## 2023-08-16 ASSESSMENT — ACTIVITIES OF DAILY LIVING (ADL)
ADLS_ACUITY_SCORE: 22

## 2023-08-16 NOTE — DISCHARGE SUMMARY
"[facility]  Discharge Summary - Medicine & Pediatrics       Date of Admission:  8/10/2023  Date of Discharge:  8/16/2023  Discharging Provider: Dr. Say Perrin  Discharge Service: Medicine Service, YURIDIA TEAM 4    Discharge Diagnoses   # Hyperkalemia, resolved  # Mild hypernatremia; resolved  # JERONIMO on CKD stage III, intrarenal; improving  # Diabetic nephropathy  # Thrombocytopenia   # ESTRADA cirrhosis c/b portal vein thrombosis  # s/p Liver transplant 11/2019  # HCC s/p TACE 1/2019, now recurrent w/ mets to peritoneum recently initiated on Sorafenib  # HBcAg +ve donor  # Immunosuppressed  # Hx of Esophageal varices  # CAD s/p 2 vessel CABG to LAD and OM  # HTN  # HLD  # DMII   # Chronic anemia, macrocytic  # Bipolar disorder     Clinically Significant Risk Factors     # Overweight: Estimated body mass index is 27.07 kg/m  as calculated from the following:    Height as of this encounter: 1.753 m (5' 9\").    Weight as of this encounter: 83.1 kg (183 lb 4.8 oz).  # Severe Malnutrition: based on nutrition assessment      Follow-ups Needed After Discharge   Follow-up Appointments     Follow Up ([facility])      Follow up with primary care provider, Lamin Ortiz, within 7 days   for hospital follow- up, medication change and to evaluate treatment   change.  The following labs/tests are recommended: CBC and BMP.      Follow up with your oncologist within 1 week of discharge.    Appointments on [facility] [place] and/or [facility] (with [facility] or [facility]   provider or service). Call [phone] if you haven't heard regarding   these appointments within 7 days of discharge.            Unresulted Labs Ordered in the Past 30 Days of this Admission       Date and Time Order Name Status Description    8/16/2023  1:17 AM Heparin Induced Thrombocytopenia Reflex In process         These results will be followed up by PCP    Discharge Disposition   Discharged to home  Condition at " discharge: Stable    Hospital Course   Frandy Workman was admitted on 8/10/2023 for hyperkalemia and JERONIMO requiring emergent dialysis in the ICU. The following problems were addressed during his hospitalization:    # Thrombocytopenia, acute   PLT 178k on the day of admission and 97 on day of discharge. Steady drop during hospitalization. 4 T score of 4 and HIT reflex panel in process. Platelets stable from 8/15 to 8/16. Bilateral upper and lower extremity doppler US negative DVT. Hematology was consulted. Stable to discharge with plan for PCP follow up with CBC check. No bleeding and patient stable. IPF ordered and normal  - CBC ordered at discharge; to be followed by PCP and oncologist    # Hyperkalemia, resolved  # Mild hypernatremia; resolved  Potassium 8 on admission. EKG with peaked T waves, Qtc 422. S/p calcium gluconate, insulin/dextrose, and one round of HD clearance. Dialysis line removed on 8/13. Likely 2/2 renal failiure and metabolic acidosis. Mild hypernatremia 8/12 resolved with increasing p.o. fluid intake. Potassium and sodium normal on day of discharge.  - Nephrology follow up outpatient scheduled 9/15/2023  - BMP ordered at discharge to be followed up by PCP     # JERONIMO on CKD stage III, intrarenal; improving  # Diabetic nephropathy  Baseline Cr ~ 2, elevated to 7.38 on admission decreasing daily. Cr 1.84 on day of discharge 8/16.  - BMP ordered at discharge to be followed up by PCP     # ESTRADA cirrhosis c/b portal vein thrombosis  # s/p Liver transplant 11/2019  # HCC s/p TACE 1/2019, now recurrent w/ mets to peritoneum recently initiated on Sorafenib  # HBcAg +ve donor  # Immunosuppressed  # Hx of Esophageal varices  He had a liver transplant in 2019 for HCC and cirrhosis. In June 2023, found to have new biopsy proven peritoneal metastases of HCC. Started on oral chemotherapy (sorafenib 400mg BID) on 7/29/23, held by oncology due to vomiting and diarrhea. Immunosuppression with prednisone and  mycophenolate. CT abdomen with stable post-surgical changes of transplant liver.  - Follow up with hepatology at discharge  - Continue Mycophenolate 500mg BID  - Continue prednisone 5 mg  - Lamivudine 50 mg at discharge due to GFR. Can resume full dose 100 mg when GFR >50       # Viral URI  Viral panel positive for Rhinovirus. Asymptomatic     # CAD s/p 2 vessel CABG to LAD and OM  # HTN  # HLD  S/p CABG 7/2021. On metoprolol 12.5 mg daily, rosuvastatin 20 mg daily, and lisinopril 10 mg. Appears volume down on exam with dry mucus membranes and tachycardia with recent low PO intake.  - Hold lisinopril until Cr stable  - Metoprolol PTA     # DMII  Last A1c 6.3. Well-controlled with outpatient regimen of basal insulin 34 units, carb count insulin, metformin, and empagliflozin.   - Resume PTA insulin  - Hold metformin and empagliflozin in setting of kidney injury; resume when appropriate     # Chronic anemia, macrocytic  Baseline hgb 11, stable here. Mixed picture with macrocytic and normocytic. Could be secondary to anemia of chronic disease vs medication adverse effects vs nutritional deficiencies. Does receive EPO per renal notes.     # Bipolar disorder  Was previously on lithium, but no recent PTA meds.    # Hyperchloremic non-anion gap metabolic acidosis; resolved  # Anion gap metabolic acidosis; resolved  VBG with pH 7.07, bicarb 9, and anion gap 18 on admission. Anion gap acidosis 2/2 starvation ketoacidosis and renal failureA. NAGMA 2/2 bicarb losses due to recent diarrhea. Elevated beta-hydroxybutyrate    Consultations This Hospital Stay   PHARMACY TO DOSE VANCO  NEPHROLOGY ESRD ADULT IP CONSULT  PHYSICAL THERAPY ADULT IP CONSULT  OCCUPATIONAL THERAPY ADULT IP CONSULT  NEPHROLOGY ICU IP CONSULT  NURSING TO CONSULT FOR VASCULAR ACCESS CARE IP CONSULT  GI HEPATOLOGY ADULT IP CONSULT  HEMATOLOGY ADULT IP CONSULT  PHARMACY IP CONSULT    Code Status   Full Code       The patient was discussed with   MD Yosi Carrasquillo Jr. [facility] [address] [address]  Phone: [phone]  ______________________________________________________________________    Physical Exam   Vital Signs: Temp: 98.2  F (36.8  C) Temp src: Oral BP: 125/82 Pulse: 80   Resp: 16 SpO2: 98 % O2 Device: None (Room air)    Weight: 183 lbs 4.8 oz  General: Awake, NAD  Neuro: A&Ox3  Pulm/Resp: Nonlabored breathing, good air entry bilaterally.   CV: regular rhythm, no murmurs appreciated on auscultation,  Abdomen: Soft, mildly distended, no tenderness to palpation.   Extremities: No lower extremity edema      Primary Care Physician   Lamin Ortiz    Discharge Orders      CBC with platelets     Basic metabolic panel     Activity    Your activity upon discharge: activity as tolerated     Follow Up ([facility])    Follow up with primary care provider, Lamin Ortiz, within 7 days for hospital follow- up, medication change and to evaluate treatment change.  The following labs/tests are recommended: CBC and BMP.      Follow up with your oncologist within 1 week of discharge.    Appointments on [place] and/or [facility] (with [facility] or [facility] provider or service). Call [phone] if you haven't heard regarding these appointments within 7 days of discharge.     Reason for your hospital stay    You were admitted for high potassium and kidney injury. Your potassium level normalized and your kidney function has continued to improve. You did require emergent dialysis when you were first admitted but things have remained stable after dialysis.     -Please follow up with your PCP within 1-2 weeks of discharge.  -Please obtain lab (CBC and CMP ordered) by 8/18/2023. Your PCP will be cc'd to have the lab results.  -Your lamivudine is 50 mg daily (prior was 100 mg daily) because of your kidney function. As your kidney function improves (GFR >50) then the lamivudine can  "go back to normal dose  - Hold sorafenib until you see your oncologist    If you notice any signs of bleeding increased from baseline, please return to the ED.     Diet    Follow this diet upon discharge: Orders Placed This Encounter      Snacks/Supplements Adult: Other; PRN per pt request; With Meals      Renal Diet (dialysis)       Significant Results and Procedures   Most Recent 3 CBC's:  Recent Labs   Lab Test 08/16/23  0601 08/15/23  0505 08/14/23  1043   WBC 4.0 4.4 6.0   HGB 10.3* 11.0* 12.3*   MCV 95 96 97   PLT 97* 97* 104*     Most Recent 3 BMP's:  Recent Labs   Lab Test 08/16/23  1200 08/16/23  0748 08/16/23  0601 08/15/23  2125 08/15/23  1935 08/15/23  0853 08/15/23  0505 08/14/23  0727 08/14/23  0653   NA  --   --  138  --   --   --  141  --  142   POTASSIUM  --   --  4.6  --  5.3  --  4.3   < > 4.5   CHLORIDE  --   --  107  --   --   --  106  --  109*   CO2  --   --  17*  --   --   --  20*  --  19*   BUN  --   --  45.1*  --   --   --  43.5*  --  51.2*   CR  --   --  1.84*  --   --   --  1.87*  --  2.31*   ANIONGAP  --   --  14  --   --   --  15  --  14   DEBORAH  --   --  8.6  --   --   --  8.7  --  8.3*   * 195* 204*   < >  --    < > 141*   < > 115*    < > = values in this interval not displayed.   ,   Results for orders placed or performed during the hospital encounter of 08/10/23   CT Abdomen Pelvis w/o Contrast    Addendum: 8/11/2023    There is a typographical omission in the original dictation. The  section titled pancreas in the body of the report should read as  follows:  \"Unenhanced appearance of the pancreas within normal limits with no  ductal dilatation.\"  The remainder of the exam is as dictated.    JUAN DAVID LOCKHART MD         SYSTEM ID:  [id number]      Narrative    EXAM: CT ABDOMEN PELVIS W/O CONTRAST  8/10/2023 6:14 PM     HISTORY:  abdominal pain       COMPARISON:  X-ray 7/28/2023    TECHNIQUE: CT abdomen and pelvis noncontrast; axial slices with  sagittal and coronal " reconstructions. Total DLP: 410 mGy*cm.    FINDINGS:  LUNG BASES:  Mild basilar scattered streaky atelectasis. Otherwise clear lung  bases.    Lower mediastinum: Intact-appearing lower median sternotomy wires.    ABDOMEN:  Liver: Post surgical changes of the liver transplantation.    Gallbladder: Surgically absent.    Pancreas:     Spleen: Within normal limits.    Adrenal glands: No focal nodule/mass.    Kidneys: No suspicious renal lesion/mass. No hydronephrosis. No renal  or urinary collecting system stones. The bladder is partially  decompressed but grossly unremarkable. Mild nonspecific unchanged  perinephric stranding.    Bowel: Normal caliber of the large and small bowel with normal  appendix. Stable small fat-containing lower thoracic/epigastric  ventral hernia (series 5 image 32). Unchanged small fat-containing  umbilical hernia.    PELVIS:  No significant pelvic free fluid. Pelvic phleboliths.    VASCULATURE AND LYMPH NODES:  Mild/moderate aortoiliac atherosclerosis calcification. No  intraperitoneal or retroperitoneal lymphadenopathy.    BONES AND SOFT TISSUES:  Degenerative changes of the visualized spine, including multilevel  thoracic anterior/anterolateral osteophyte formation. No aggressive or  suspicious osseous/soft tissue lesion identified.       Impression    IMPRESSION:  1. No acute process within the lung bases, abdomen, or pelvis.  2. Stable postsurgical changes of liver transplantation.    I have personally reviewed the examination and initial interpretation  and I agree with the findings.    JUAN DAVID LOCKHART MD         SYSTEM ID:  [id number]   XR Chest Port 1 View    Narrative    EXAM: XR CHEST PORT 1 VIEW  LOCATION: [facility]  DATE: 8/10/2023    INDICATION: Weakness.  COMPARISON: 05/17/2023.      Impression    IMPRESSION: No focal airspace consolidation. No pleural effusion or pneumothorax.    Mild cardiomegaly. Median sternotomy and coronary  artery bypass grafting. Atherosclerosis of the thoracic aorta.   XR Chest Port 1 View    Narrative    EXAM:  XR CHEST PORT 1 VIEW    INDICATION: s/p right HD catheter placement    COMPARISON:  Chest x-ray 10/20/2023 at 2002    FINDINGS:  Single AP view of the chest. Sternotomy wires. Right IJ central venous  catheter terminates over the SVC. Mediastinal clips. Epigastric  surgical clips.    Cardiomediastinal silhouette within normal limits.  Linear opacities  at the right lung base.  No pneumothorax.  No pleural effusion.       Impression    IMPRESSION:  1.  Right IJ central venous catheter terminates over the SVC.  2.  Linear opacities at the right lung base suggests atelectasis.    I have personally reviewed the examination and initial interpretation  and I agree with the findings.    DINO LAWS MD         SYSTEM ID:  [id number]   US Lower Extremity Venous Duplex Bilateral    Narrative    Exam: Ultrasound of the deep venous system of bilateral legs dated  8/11/2023 10:03 AM    Clinical information: Metastatic HCC (s/p transplant); r/o DVT    Comparison: Ultrasound 7/13/2021    Ordering provider: Perry Powell MD    Technique: Moon-scale evaluation with compression and Doppler  assessment of deep venous system for spontaneous and phasic flow, as  well as the presence of distal augmentation. Color flow images  obtained as needed. Gray-scale images with compression of the great  saphenous vein obtained as needed.    Findings:    Right leg:    CFV: Thrombus: No, Phasic: Yes  Femoral vein, proximal: Thrombus: No, Phasic: Yes  Femoral vein, mid: Thrombus: No, Phasic: Yes  Femoral vein, distal: Thrombus: No, Phasic: Yes  Popliteal vein: Thrombus: No, Phasic: Yes  PTV: Thrombus: No  Peroneal vein: Not visualized.     Left leg:    CFV: Thrombus: No, Phasic: Yes  Femoral vein, proximal: Thrombus: No, Phasic: Yes  Femoral vein, mid: Thrombus: No, Phasic: Yes  Femoral vein, distal: Thrombus: No, Phasic: Yes  Popliteal  "vein: Thrombus: No, Phasic: Yes  PTV: Thrombus: Thrombus: No  Peroneal vein: Not visualized.      Impression    Impression:    Right leg: No deep venous thrombosis. The peroneal vein was not  visualized.    Left leg: No deep venous thrombosis. The peroneal vein was not  visualized.    Reference: \"Duplex Ultrasound in the Diagnosis of Lower-Extremity Deep  Venous Thrombosis\"- Charlene Esteban MD, S; Julian Zhou MD  (Circulation. 2014;129:917-921. http://circ.ahajournals.org )    I have personally reviewed the examination and initial interpretation  and I agree with the findings.    MIGUEL ANGEL TURPIN MD         SYSTEM ID:  [id number]   US Upper Ext Venous Duplex Limited Bilat    Narrative    Exam: Duplex Doppler ultrasound assessment of the upper extremities  veins bilaterally 8/11/2023 10:00 AM    Clinical information: Metastatic HCC (s/p transplant); r/o DVT    Ordering provider: Perry Powell MD    Comparison:     Technique: B-mode (grayscale) and duplex Doppler ultrasound of the  upper extremity veins, including compressibility when feasible.  Velocity measurements obtained with angle correct at or less than 60  degrees.     Findings:     Right upper extremity:    Internal jugular vein: Thrombus: No, Phasic: Yes    Innominate vein: Thrombus: No, Phasic: Yes    Subclavian vein proximal: Thrombus: No, Phasic: Yes  Subclavian vein mid: Thrombus: No, Phasic: Yes    Axillary vein: Thrombus: No, Phasic: Yes    Right upper extremity veins demonstrate normal compressibility,  phasicity, and pulsatility.      Left upper extremity:    Internal jugular vein: Thrombus: No, Phasic: Yes    Innominate vein: Thrombus: No, Phasic: Yes    Subclavian vein proximal: Thrombus: No, Phasic: Yes  Subclavian vein mid: Thrombus: No, Phasic: Yes    Axillary vein: Thrombus: No, Phasic: Yes    Left upper extremity veins demonstrate normal compressibility,  phasicity, and pulsatility.          Impression    Impression:    1. Right upper " extremity: No deep vein thrombosis.    2. Left upper extremity: No deep vein thrombosis.    I have personally reviewed the examination and initial interpretation  and I agree with the findings.    MIGUEL ANGEL TURPIN MD         SYSTEM ID:  [id number]     *Note: Due to a large number of results and/or encounters for the requested time period, some results have not been displayed. A complete set of results can be found in Results Review.       Discharge Medications   Current Discharge Medication List        START taking these medications    Details   lamiVUDine (EPIVIR) 10 MG/ML solution Take 5 mLs (50 mg) by mouth daily  Qty: 240 mL, Refills: 0    Associated Diagnoses: Liver replaced by transplant (H)           CONTINUE these medications which have NOT CHANGED    Details   Alcohol Swabs (ALCOHOL PREP) 70 % PADS Use for glucose testing 4x daily  and insulin administration 4x daily.  Qty: 400 each, Refills: 1    Associated Diagnoses: Type 2 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, unspecified whether long term insulin use (H)      ammonium lactate (AMLACTIN) 12 % external cream Apply topically daily as needed for dry skin (on feet)      aspirin (SM ASPIRIN ADULT LOW STRENGTH) 81 MG EC tablet Take 2 tablets (162 mg) by mouth daily  Qty: 180 tablet, Refills: 3    Associated Diagnoses: S/P CABG (coronary artery bypass graft)      BD VIKTORIA U/F 32G X 4 MM insulin pen needle Use 5 per day  Qty: 300 each, Refills: 3    Associated Diagnoses: Type 2 diabetes mellitus without complication, with long-term current use of insulin (H)      benzoyl peroxide 5 % external liquid Use topically in showers as a body wash  Qty: 226 g, Refills: 11    Associated Diagnoses: Folliculitis      Continuous Blood Gluc Sensor (FREESTYLE CLAUDIA 2 SENSOR) MISC 1 each See Admin Instructions Change every 14 days.  Qty: 7 each, Refills: 3    Associated Diagnoses: Type 2 diabetes mellitus with mild nonproliferative  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 macular edema, unspecified whether long term insulin use (H)      insulin aspart (NOVOLOG PEN) 100 UNIT/ML pen Inject 5-10 Units Subcutaneous 4 times daily (with meals and nightly) 1unit : 8 g carb before meals.  Also add 1 unit : 50 mg/dl >180 before meals and at bedtime.  Qty: 45 mL, Refills: 3    Associated Diagnoses: S/P CABG (coronary artery bypass graft)      insulin degludec (TRESIBA FLEXTOUCH) 100 UNIT/ML pen Inject 34 units subcutaneous once daily  Qty: 45 mL, Refills: 3    Associated Diagnoses: Type 2 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, unspecified whether long term insulin use (H)      ketoconazole (NIZORAL) 2 % external shampoo Apply thin layer topically to scalp in shower (leave on 5 min prior to rinse); may also use as a body wash  Qty: 120 mL, Refills: 11    Associated Diagnoses: Folliculitis      metoprolol succinate ER (TOPROL XL) 25 MG 24 hr tablet Take 0.5 tablets (12.5 mg) by mouth daily  Qty: 45 tablet, Refills: 1    Associated Diagnoses: S/P CABG (coronary artery bypass graft)      Multiple Vitamin (TAB-A-GLADIS) TABS TAKE ONE TABLET BY MOUTH ONCE DAILY  Qty: 90 tablet, Refills: 0    Associated Diagnoses: Chronic kidney disease, stage 3a (H)      mycophenolate (GENERIC EQUIVALENT) 250 MG capsule Take 2 capsules (500 mg) by mouth every 12 hours  Qty: 120 capsule, Refills: 11    Comments: TXP DT 11/11/2019 (Liver) TXP Dischg DT 11/20/2019 DX Liver replaced by transplant Z94.4 TX Center [facility] ([place], [place])  Associated Diagnoses: Status post liver transplantation (H)      ondansetron (ZOFRAN ODT) 8 MG ODT tab Take 1 tablet (8 mg) by mouth every 8 hours as needed for nausea  Qty: 15 tablet, Refills: 1    Associated Diagnoses: Nausea and vomiting, unspecified vomiting type      pantoprazole (PROTONIX) 40 MG EC tablet Take 1 tablet (40 mg) by mouth daily before  breakfast  Qty: 90 tablet, Refills: 3    Associated Diagnoses: Liver transplant recipient (H)      predniSONE (DELTASONE) 5 MG tablet Take 1 tablet (5 mg) by mouth daily  Qty: 90 tablet, Refills: 3    Associated Diagnoses: Status post liver transplantation (H)      rosuvastatin (CRESTOR) 20 MG tablet Take 1 tablet (20 mg) by mouth daily  Qty: 90 tablet, Refills: 3    Associated Diagnoses: Well controlled type 2 diabetes mellitus (H)      tamsulosin (FLOMAX) 0.4 MG capsule Take 1 capsule (0.4 mg) by mouth daily  Qty: 90 capsule, Refills: 3    Associated Diagnoses: Benign prostatic hyperplasia with lower urinary tract symptoms, symptom details unspecified      Vitamin D3 50 mcg (2000 units) tablet Take 1 tablet (50 mcg) by mouth daily  Qty: 90 tablet, Refills: 3    Associated Diagnoses: Liver transplant recipient (H)           STOP taking these medications       empagliflozin (JARDIANCE) 10 MG TABS tablet Comments:   Reason for Stopping:         lamiVUDine (EPIVIR) 100 MG tablet Comments:   Reason for Stopping:         lisinopril (ZESTRIL) 10 MG tablet Comments:   Reason for Stopping:         metFORMIN (GLUCOPHAGE XR) 500 MG 24 hr tablet Comments:   Reason for Stopping:             Allergies   Allergies   Allergen Reactions    Codeine Other (See Comments)     Cannot take due to liver  Cannot tolerate oral narcotics    Seasonal Allergies      Sneezing, coughing, runny and itchy eyes

## 2023-08-16 NOTE — PROGRESS NOTES
DISCHARGE:  Patient with orders to discharge to home. Discharge instructions, medications, & follow ups reviewed with pt. Copy of discharge summary given to pt.  PIV removed. All belongings packed & sent with patient. Medications filled & picked up at discharge pharmacy. Patient in stable condition. AVSS. Pt had no further questions regarding discharge instructions and medications.

## 2023-08-16 NOTE — CARE PLAN
"Vitals: Blood pressure 133/75, pulse 96, temperature 98.3  F (36.8  C), temperature source Oral, resp. rate 16, height 1.753 m (5' 9\"), weight 83.1 kg (183 lb 4.8 oz), SpO2 100 %.  Time: 1900-0730  Neuro: A/O x 4, calls appropriately and makes needs known.  Activity: up ad karely   Pain and N/V: Denies Pain and n/v.  GI/: No BM this shift, Voiding spontaneously. AUOP.   Cardiac: Denies cardiac chest pain.   Diet: Renal diet.   Respiratory: Denies SOB, on RA  Lines: PIV SL.  Incisions/Drains/Skin: WDL, No new deficit.   Lab: Reviewed.  New changes this shift: No significant changes this shift, Continue POC.    "

## 2023-08-16 NOTE — PROGRESS NOTES
"/82   Pulse 80   Temp 98.2  F (36.8  C) (Oral)   Resp 16   Ht 1.753 m (5' 9\")   Wt 83.1 kg (183 lb 4.8 oz)   SpO2 98%   BMI 27.07 kg/m          [id number]  VSS on RA. A+Ox4. ACHS, 195 and 185. Insulin given per sliding scale. Potassium 4.6. Mag 2.0, oral replacement given. Denies pain or nausea. Renal diet. PIV removed prior to discharge. BM today. Voiding WOD. Skin intact. Up ad karely, pt ambulated in halls. Oncology following. Will continue to monitor and notify team with changes.   "

## 2023-08-16 NOTE — PROGRESS NOTES
Solid Tumor Oncology Consult Service  Progress Note   Date of Service: 08/16/2023  Patient: Frandy Workman  MRN: [id number]  Admission Date: 8/10/2023  Hospital Day # 6  Cancer Diagnosis: metastatic HCC  Primary Outpatient Oncologist: Eagle Villarreal  Current Treatment Plan: Sorafenib     Summary & Recommendations:   - continue to hold sorafenib  - patient has lab follow up on Friday, 8/18. Oncology team placed an order for follow up in clinic next week for a provider visit.  - continue to hold off on heparin products  - HIT assay pending    Assessment & Plan:   Frandy Workman is a 58 year old year old male with a history of ESTRADA cirrhosis c/b HCC s/p liver transplant in 2019 now with new peritoneal metastases on sorafenib, CAD s/p CABG, CKD stage 3, T2DM who was admitted on 8/10/23 for emergent dialysis. Oncology was consulted for thrombocytopenia.    Several possible etiologies for his thrombocytopenia, including sorafenib therapy, HIT, drug-induced thrombocytopenia, ITP, DIC. Sorafenib is known to cause thrombocytopenia in up to 45% of patients, and this is the most likely etiology. HIT is also possible, though less likely; patient has received heparin in the past, including in the past month. He has been receiving heparin on this admission as well for DVT prophylaxis, though his platelet count began decreasing on the day he started subQ heparin. His 4T score is 4. Upper and lower DVT ultrasounds were negative. Could consider drug-induced thrombocytopenia as well, though the patient was not started on any new medications that would cause thrombocytopenia. He was given one dose of Zosyn in the ED on 8/10, but has not received any since then. Low suspicion for ITP, TTP, or DIC at this time.  The most likely culprit is sorafenib.    Oncologic History:  The patient has a history of ESTRADA cirrhosis complicated by HCC in 2018. He had a liver transplant in 2019. On routine surveillance June 2023, he was found  "to have new peritoneal masses. Laparoscopy confirmed metastatic poorly-differentiated HCC. He was started on sorafenib on 7/29/23.      Patient was seen and plan of care was discussed with attending physician Dr. Sams.    ___________________________________________________________________    Subjective & Interval History:    No acute events noted overnight. Patient feels a little tired this morning but otherwise has no new complaints. Denies bleeding. Does have a few bruises on his arms. Is urinating okay. Denies fevers, chills, abdominal pain, nausea, vomiting, diarrhea.     Physical Exam:    Blood pressure 125/82, pulse 80, temperature 98.2  F (36.8  C), temperature source Oral, resp. rate 16, height 1.753 m (5' 9\"), weight 83.1 kg (183 lb 4.8 oz), SpO2 98 %.    General: lying in bed, no acute distress  HEENT: sclera anicteric, EOMI, MMM  Neck: supple, normal ROM  CV: RRR, normal S1/S2, no m/r/g  Resp: CTAB, no wheezing/crackles, normal respiratory effort on ambient air  GI: soft, non-tender, non-distended, bowel sounds present and normoactive  MSK: warm and well-perfused, normal tone  Skin: no rashes on limited exam, no jaundice  Neuro: Alert and interactive, moves all extremities equally, no focal deficits    Labs & Studies: I personally reviewed the following studies:  ROUTINE LABS (Last four results):  CMP  Recent Labs   Lab 08/16/23  1200 08/16/23  0748 08/16/23  0601 08/15/23  2148 08/15/23  2125 08/15/23  1935 08/15/23  0853 08/15/23  0505 08/14/23 2129 08/14/23  1844 08/14/23  0727 08/14/23  0653 08/13/23  0847 08/13/23  0814 08/10/23  2159 08/10/23  2151 08/10/23  1844 08/10/23  1833 08/10/23  1024   NA  --   --  138  --   --   --   --  141  --   --   --  142  --  145   < > 141   < > 138 139   POTASSIUM  --   --  4.6  --   --  5.3  --  4.3  --  5.1  --  4.5   < > 4.5   < > 5.9*   < > 8.0* 6.3*   CHLORIDE  --   --  107  --   --   --   --  106  --   --   --  109*  --  110*   < > 116*  --  112* 112*   CO2  " --   --  17*  --   --   --   --  20*  --   --   --  19*  --  20*   < > 9*  --  8* 9*   ANIONGAP  --   --  14  --   --   --   --  15  --   --   --  14  --  15   < > 16*  --  18* 18*   * 195* 204* 144*   < >  --    < > 141*   < >  --    < > 115*   < > 124*   < > 113*   < > 130* 180*   BUN  --   --  45.1*  --   --   --   --  43.5*  --   --   --  51.2*  --  55.8*   < > 105.0*  --  117.0* 107.0*   CR  --   --  1.84*  --   --   --   --  1.87*  --   --   --  2.31*  --  2.82*   < > 6.77*  --  7.38* 6.50*   GFRESTIMATED  --   --  42*  --   --   --   --  41*  --   --   --  32*  --  25*   < > 9*  --  8* 9*   DEBORAH  --   --  8.6  --   --   --   --  8.7  --   --   --  8.3*  --  8.4*   < > 7.9*  --  8.6 9.1   MAG  --   --  2.0  --   --   --   --  1.9  --   --   --  1.9  --  1.9   < > 2.1  --  2.3 2.3   PHOS  --   --   --   --   --   --   --   --   --   --   --   --   --   --   --   --   --   --  6.0*   PROTTOTAL  --   --   --   --   --   --   --   --   --   --   --   --   --   --   --  6.0*  --  7.3 7.6   ALBUMIN  --   --   --   --   --   --   --   --   --   --   --   --   --   --   --  3.4*  --  4.3 4.4   BILITOTAL  --   --   --   --   --   --   --   --   --   --   --   --   --   --   --  0.2  --  0.3 0.4   ALKPHOS  --   --   --   --   --   --   --   --   --   --   --   --   --   --   --  84  --  108 110   AST  --   --   --   --   --   --   --   --   --   --   --   --   --   --   --  25  --  22 24   ALT  --   --   --   --   --   --   --   --   --   --   --   --   --   --   --  16  --  26 27    < > = values in this interval not displayed.     CBC  Recent Labs   Lab 08/16/23  0601 08/15/23  0505 08/14/23  1043 08/14/23  0653   WBC 4.0 4.4 6.0 4.6   RBC 3.30* 3.58* 3.92* 3.49*   HGB 10.3* 11.0* 12.3* 10.8*   HCT 31.4* 34.2* 37.9* 33.7*   MCV 95 96 97 97   MCH 31.2 30.7 31.4 30.9   MCHC 32.8 32.2 32.5 32.0   RDW 12.9 12.9 13.2 13.2   PLT 97* 97* 104* 102*     INR  Recent Labs   Lab 08/10/23  1833   INR 1.16*     Medications  list for reference:  Current Facility-Administered Medications   Medication     acetaminophen (TYLENOL) tablet 650 mg    Or     acetaminophen (TYLENOL) solution 650 mg     apixaban ANTICOAGULANT (ELIQUIS) tablet 2.5 mg     aspirin EC tablet 81 mg     glucose gel 15-30 g    Or     dextrose 50 % injection 25-50 mL    Or     glucagon injection 1 mg     insulin aspart (NovoLOG) injection (RAPID ACTING)     insulin aspart (NovoLOG) injection (RAPID ACTING)     insulin degludec (TRESIBA) 100 UNIT/ML injection 20 Units     [START ON 8/17/2023] lamiVUDine (EPIVIR) solution 50 mg     [Held by provider] lamiVUDine (EPIVIR) tablet 100 mg     magnesium oxide (MAG-OX) tablet 400 mg     [Held by provider] metoprolol succinate ER (TOPROL-XL) 24 hr half-tab 12.5 mg     mycophenolate (GENERIC EQUIVALENT) capsule 500 mg     naloxone (NARCAN) injection 0.2 mg    Or     naloxone (NARCAN) injection 0.4 mg    Or     naloxone (NARCAN) injection 0.2 mg    Or     naloxone (NARCAN) injection 0.4 mg     ondansetron (ZOFRAN ODT) ODT tab 4 mg     ondansetron (ZOFRAN) injection 4 mg     pantoprazole (PROTONIX) EC tablet 40 mg     predniSONE (DELTASONE) tablet 5 mg     prochlorperazine (COMPAZINE) tablet 5 mg     [Held by provider] rosuvastatin (CRESTOR) tablet 20 mg     senna-docusate (SENOKOT-S/PERICOLACE) 8.6-50 MG per tablet 1 tablet    Or     senna-docusate (SENOKOT-S/PERICOLACE) 8.6-50 MG per tablet 2 tablet     tamsulosin (FLOMAX) capsule 0.4 mg     Vitamin D3 (CHOLECALCIFEROL) tablet 50 mcg            Addendum:  Pt was seen and evaluated by me with Dr Franco.  We reviewed patient's clinical course.  He is improving.    Discussed holding sorafenib until seen in the clinic.   Labs on Friday 8/18  Anticipating pt will be traveling over the Labor Day holiday, it would be reasonable to hold sorafenib until after this trip.    Raquel Sams MD

## 2023-08-18 ENCOUNTER — LAB (OUTPATIENT)
Dept: LAB | Facility: CLINIC | Age: 58
End: 2023-08-18
Payer: MEDICARE

## 2023-08-18 DIAGNOSIS — D69.6 THROMBOCYTOPENIA (H): ICD-10-CM

## 2023-08-18 DIAGNOSIS — N17.9 AKI (ACUTE KIDNEY INJURY) (H): ICD-10-CM

## 2023-08-18 LAB
ANION GAP SERPL CALCULATED.3IONS-SCNC: 15 MMOL/L (ref 7–15)
BUN SERPL-MCNC: 61.5 MG/DL (ref 8–23)
CALCIUM SERPL-MCNC: 9.3 MG/DL (ref 8.6–10)
CHLORIDE SERPL-SCNC: 106 MMOL/L (ref 98–107)
CREAT SERPL-MCNC: 2.84 MG/DL (ref 0.67–1.17)
DEPRECATED HCO3 PLAS-SCNC: 19 MMOL/L (ref 22–29)
ERYTHROCYTE [DISTWIDTH] IN BLOOD BY AUTOMATED COUNT: 13.1 % (ref 10–15)
GFR SERPL CREATININE-BSD FRML MDRD: 25 ML/MIN/1.73M2
GLUCOSE SERPL-MCNC: 129 MG/DL (ref 70–99)
HCT VFR BLD AUTO: 33.6 % (ref 40–53)
HGB BLD-MCNC: 11 G/DL (ref 13.3–17.7)
MCH RBC QN AUTO: 31.8 PG (ref 26.5–33)
MCHC RBC AUTO-ENTMCNC: 32.7 G/DL (ref 31.5–36.5)
MCV RBC AUTO: 97 FL (ref 78–100)
PLATELET # BLD AUTO: 129 10E3/UL (ref 150–450)
POTASSIUM SERPL-SCNC: 4.6 MMOL/L (ref 3.4–5.3)
RBC # BLD AUTO: 3.46 10E6/UL (ref 4.4–5.9)
SODIUM SERPL-SCNC: 140 MMOL/L (ref 136–145)
WBC # BLD AUTO: 6.7 10E3/UL (ref 4–11)

## 2023-08-18 PROCEDURE — 85027 COMPLETE CBC AUTOMATED: CPT | Performed by: PATHOLOGY

## 2023-08-18 PROCEDURE — 36415 COLL VENOUS BLD VENIPUNCTURE: CPT | Performed by: PATHOLOGY

## 2023-08-18 PROCEDURE — 80048 BASIC METABOLIC PNL TOTAL CA: CPT | Performed by: PATHOLOGY

## 2023-08-22 ENCOUNTER — TELEPHONE (OUTPATIENT)
Dept: TRANSPLANT | Facility: CLINIC | Age: 58
End: 2023-08-22

## 2023-08-23 DIAGNOSIS — Z94.4 LIVER REPLACED BY TRANSPLANT (H): Primary | ICD-10-CM

## 2023-08-24 ENCOUNTER — LAB (OUTPATIENT)
Dept: LAB | Facility: CLINIC | Age: 58
End: 2023-08-24
Payer: MEDICARE

## 2023-08-24 DIAGNOSIS — C22.0 HEPATOCELLULAR CARCINOMA (H): ICD-10-CM

## 2023-08-24 DIAGNOSIS — C22.0 HCC (HEPATOCELLULAR CARCINOMA) (H): ICD-10-CM

## 2023-08-24 DIAGNOSIS — R60.9 EDEMA, UNSPECIFIED TYPE: Primary | ICD-10-CM

## 2023-08-24 DIAGNOSIS — M79.662 BILATERAL CALF PAIN: ICD-10-CM

## 2023-08-24 DIAGNOSIS — Z79.899 ENCOUNTER FOR LONG-TERM (CURRENT) USE OF MEDICATIONS: ICD-10-CM

## 2023-08-24 DIAGNOSIS — M79.661 BILATERAL CALF PAIN: ICD-10-CM

## 2023-08-24 LAB
ALBUMIN SERPL BCG-MCNC: 4.4 G/DL (ref 3.5–5.2)
ALP SERPL-CCNC: 77 U/L (ref 40–129)
ALT SERPL W P-5'-P-CCNC: 20 U/L (ref 0–70)
ANION GAP SERPL CALCULATED.3IONS-SCNC: 13 MMOL/L (ref 7–15)
AST SERPL W P-5'-P-CCNC: 18 U/L (ref 0–45)
BASOPHILS # BLD AUTO: 0 10E3/UL (ref 0–0.2)
BASOPHILS NFR BLD AUTO: 0 %
BILIRUB SERPL-MCNC: 0.2 MG/DL
BUN SERPL-MCNC: 35.2 MG/DL (ref 8–23)
CALCIUM SERPL-MCNC: 9.3 MG/DL (ref 8.6–10)
CHLORIDE SERPL-SCNC: 106 MMOL/L (ref 98–107)
CREAT SERPL-MCNC: 1.71 MG/DL (ref 0.67–1.17)
DEPRECATED HCO3 PLAS-SCNC: 20 MMOL/L (ref 22–29)
EOSINOPHIL # BLD AUTO: 0.1 10E3/UL (ref 0–0.7)
EOSINOPHIL NFR BLD AUTO: 2 %
ERYTHROCYTE [DISTWIDTH] IN BLOOD BY AUTOMATED COUNT: 13.3 % (ref 10–15)
GFR SERPL CREATININE-BSD FRML MDRD: 46 ML/MIN/1.73M2
GLUCOSE SERPL-MCNC: 212 MG/DL (ref 70–99)
HCT VFR BLD AUTO: 31.5 % (ref 40–53)
HGB BLD-MCNC: 10.2 G/DL (ref 13.3–17.7)
IMM GRANULOCYTES # BLD: 0 10E3/UL
IMM GRANULOCYTES NFR BLD: 0 %
LYMPHOCYTES # BLD AUTO: 0.7 10E3/UL (ref 0.8–5.3)
LYMPHOCYTES NFR BLD AUTO: 15 %
MAGNESIUM SERPL-MCNC: 1.6 MG/DL (ref 1.7–2.3)
MCH RBC QN AUTO: 31.5 PG (ref 26.5–33)
MCHC RBC AUTO-ENTMCNC: 32.4 G/DL (ref 31.5–36.5)
MCV RBC AUTO: 97 FL (ref 78–100)
MONOCYTES # BLD AUTO: 0.4 10E3/UL (ref 0–1.3)
MONOCYTES NFR BLD AUTO: 9 %
NEUTROPHILS # BLD AUTO: 3.5 10E3/UL (ref 1.6–8.3)
NEUTROPHILS NFR BLD AUTO: 74 %
NRBC # BLD AUTO: 0 10E3/UL
NRBC BLD AUTO-RTO: 0 /100
PHOSPHATE SERPL-MCNC: 2.2 MG/DL (ref 2.5–4.5)
PLATELET # BLD AUTO: 151 10E3/UL (ref 150–450)
POTASSIUM SERPL-SCNC: 4.6 MMOL/L (ref 3.4–5.3)
PROT SERPL-MCNC: 6.8 G/DL (ref 6.4–8.3)
RBC # BLD AUTO: 3.24 10E6/UL (ref 4.4–5.9)
SODIUM SERPL-SCNC: 139 MMOL/L (ref 136–145)
WBC # BLD AUTO: 4.7 10E3/UL (ref 4–11)

## 2023-08-24 PROCEDURE — 36415 COLL VENOUS BLD VENIPUNCTURE: CPT | Performed by: PATHOLOGY

## 2023-08-24 PROCEDURE — 93000 ELECTROCARDIOGRAM COMPLETE: CPT | Performed by: INTERNAL MEDICINE

## 2023-08-24 PROCEDURE — 80053 COMPREHEN METABOLIC PANEL: CPT | Performed by: PATHOLOGY

## 2023-08-24 PROCEDURE — 85025 COMPLETE CBC W/AUTO DIFF WBC: CPT | Performed by: PATHOLOGY

## 2023-08-24 PROCEDURE — 84100 ASSAY OF PHOSPHORUS: CPT | Performed by: PATHOLOGY

## 2023-08-24 PROCEDURE — 83735 ASSAY OF MAGNESIUM: CPT | Performed by: PATHOLOGY

## 2023-08-25 ENCOUNTER — VIRTUAL VISIT (OUTPATIENT)
Dept: BEHAVIORAL HEALTH | Facility: CLINIC | Age: 58
End: 2023-08-25
Payer: MEDICARE

## 2023-08-25 ENCOUNTER — ONCOLOGY VISIT (OUTPATIENT)
Dept: ONCOLOGY | Facility: CLINIC | Age: 58
End: 2023-08-25
Attending: INTERNAL MEDICINE
Payer: MEDICARE

## 2023-08-25 VITALS
WEIGHT: 183.6 LBS | DIASTOLIC BLOOD PRESSURE: 81 MMHG | SYSTOLIC BLOOD PRESSURE: 131 MMHG | BODY MASS INDEX: 27.11 KG/M2 | TEMPERATURE: 98.6 F | RESPIRATION RATE: 16 BRPM | OXYGEN SATURATION: 97 % | HEART RATE: 104 BPM

## 2023-08-25 DIAGNOSIS — C22.0 HCC (HEPATOCELLULAR CARCINOMA) (H): Primary | ICD-10-CM

## 2023-08-25 DIAGNOSIS — F41.1 GENERALIZED ANXIETY DISORDER: ICD-10-CM

## 2023-08-25 DIAGNOSIS — C78.6 MALIGNANT NEOPLASM METASTATIC TO PERITONEUM (H): ICD-10-CM

## 2023-08-25 DIAGNOSIS — F31.31 BIPOLAR 1 DISORDER, DEPRESSED, MILD (H): Primary | ICD-10-CM

## 2023-08-25 PROCEDURE — 90837 PSYTX W PT 60 MINUTES: CPT | Mod: VID | Performed by: PSYCHOLOGIST

## 2023-08-25 PROCEDURE — G0463 HOSPITAL OUTPT CLINIC VISIT: HCPCS | Performed by: NURSE PRACTITIONER

## 2023-08-25 PROCEDURE — 99215 OFFICE O/P EST HI 40 MIN: CPT | Performed by: NURSE PRACTITIONER

## 2023-08-25 ASSESSMENT — PAIN SCALES - GENERAL: PAINLEVEL: NO PAIN (0)

## 2023-08-25 NOTE — PROGRESS NOTES
[facility]: Integrated Behavioral Health  August 25, 2023        Behavioral Health Clinician Progress Note    Patient Name: Frandy Workman           Service Type: Video visit      Service Location:  Video visit      Session Start Time: 2:05  Session End Time: 3:00      Session Length: 53 - 60      Attendees: Patient    Visit Activities (Refresh list every visit): [facility] Only     Service Modality:  Video Visit:      Provider verified identity through the following two step process.  Patient provided:  Patient photo and Patient is known previously to provider    Telemedicine Visit: The patient's condition can be safely assessed and treated via synchronous audio and visual telemedicine encounter.      Reason for Telemedicine Visit: Services only offered telehealth    Originating Site (Patient Location): Patient's home    Distant Site (Provider Location): Provider Remote Setting- Home Office    Consent:  The patient/guardian has verbally consented to: the potential risks and benefits of telemedicine (video visit) versus in person care; bill my insurance or make self-payment for services provided; and responsibility for payment of non-covered services.     Patient would like the video invitation sent by:  My Chart    Mode of Communication:  Video Conference via [facility]    Distant Location (Provider):  Off-site    As the provider I attest to compliance with applicable laws and regulations related to telemedicine.      Diagnostic Assessment Date:  12/03/2020  Treatment Plan Review Date:  11/4/2023  See Flowsheets for today's PHQ-9 and LIZZETTE-7 results  Previous PHQ-9:       5/17/2023     3:37 PM 6/28/2023     4:07 PM 8/3/2023     9:13 AM   PHQ-9 SCORE   PHQ-9 Total Score MyChart  3 (Minimal depression) 3 (Minimal depression)   PHQ-9 Total Score 6 3 3     Previous LIZZETTE-7:       4/7/2021    12:34 PM 9/30/2021     1:45 PM 5/17/2023     3:37 PM   LIZZETTE-7 SCORE   Total Score 9 (mild anxiety)     Total  Score 9 10 6       Extended Session (60+ minutes): No  Interactive Complexity: No  Crisis: No    Treatment Objective(s) Addressed in This Session:  Target Behavior(s): disease management/lifestyle changes   related to adaptive approaches to managing anxious distress and mood difficulties    Depressed mood: Increase interest, pleasure in doing things. Anger management strategies.      Current Stressors / Issues:  [facility] met with Miller today for a follow-up, to help Miller continue to feel supported in his health behavior change and overall mental health.      Discusses recent hospitalization and care experiences. Chemo therapy for 12 days, then 13th day went poorly he says. Miller voices frustration about a care decision that was made on his behalf.  Managing a lot of stress related to health decline and making significant decisions about his health care directives. States facing stress from a interpersonal conflict as well. Miller notes he has observed himself more angry, impatient lately. Reviewed ways to work on anger, by slowing down, focusing on how hed like to approach various situations with his health and others.     Planning [place] trip. Will follow up with PCP before leaving.     Patient Relations [phone]    Answers for HPI/ROS submitted by the patient on 7/12/2022  If you checked off any problems, how difficult have these problems made it for you to do your work, take care of things at home, or get along with other people?: Somewhat difficult  PHQ9 TOTAL SCORE: 4    Answers for HPI/ROS submitted by the patient on 9/8/2022  If you checked off any problems, how difficult have these problems made it for you to do your work, take care of things at home, or get along with other people?: Somewhat difficult  PHQ9 TOTAL SCORE: 3    Answers for HPI/ROS submitted by the patient on 11/21/2022  If you checked off any problems, how difficult have these problems made it for you to do your work, take care of things at home, or get  along with other people?: Very difficult  PHQ9 TOTAL SCORE: 4      Answers for HPI/ROS submitted by the patient on 1/18/2023  If you checked off any problems, how difficult have these problems made it for you to do your work, take care of things at home, or get along with other people?: Extremely difficult  PHQ9 TOTAL SCORE: 8        Progress on Treatment Objective(s) / Homework:  Stable - ACTION (Actively working towards change); Intervened by reinforcing change plan / affirming steps taken      Solution-Focused Therapy    Explore patterns in patient's relationships and discuss options for new behaviors.    Explore patterns in patient's actions and choices and discuss options for new behaviors.    Behavioral Activation    Discuss steps patient can take to become more involved in meaningful activity.    Identify barriers to these activities and explore possible solutions.      Answers for HPI/ROS submitted by the patient on 3/21/2022  If you checked off any problems, how difficult have these problems made it for you to do your work, take care of things at home, or get along with other people?: Not difficult at all  PHQ9 TOTAL SCORE: 6      Answers for HPI/ROS submitted by the patient on 2/8/2022  If you checked off any problems, how difficult have these problems made it for you to do your work, take care of things at home, or get along with other people?: Somewhat difficult  PHQ9 TOTAL SCORE: 4      Answers for HPI/ROS submitted by the patient on 4/7/2021   LIZZETTE 7 TOTAL SCORE: 9  If you checked off any problems, how difficult have these problems made it for you to do your work, take care of things at home, or get along with other people?: Very difficult  PHQ9 TOTAL SCORE: 7*    *No SI    Answers for HPI/ROS submitted by the patient on 10/13/2020   If you checked off any problems, how difficult have these problems made it for you to do your work, take care of things at home, or get along with other people?: Somewhat  difficult  PHQ9 TOTAL SCORE: 8*  LIZZETTE 7 TOTAL SCORE: 6      *No SI     Answers for HPI/ROS submitted by the patient on 12/29/2020   If you checked off any problems, how difficult have these problems made it for you to do your work, take care of things at home, or get along with other people?: Somewhat difficult  PHQ9 TOTAL SCORE: 5*  LIZZETTE 7 TOTAL SCORE: 8    *No SI     Answers for HPI/ROS submitted by the patient on 11/21/2022  If you checked off any problems, how difficult have these problems made it for you to do your work, take care of things at home, or get along with other people?: Very difficult  PHQ9 TOTAL SCORE: 4          Care Plan review completed: no    Medication Review:  No changes to current psychiatric medication(s)     Reports discontinuing zolpidem.       Current Outpatient Medications   Medication     Alcohol Swabs (ALCOHOL PREP) 70 % PADS     ammonium lactate (AMLACTIN) 12 % external cream     aspirin (SM ASPIRIN ADULT LOW STRENGTH) 81 MG EC tablet     BD VIKTORIA U/F 32G X 4 MM insulin pen needle     benzoyl peroxide 5 % external liquid     blood glucose (NO BRAND SPECIFIED) lancets standard     blood glucose (NO BRAND SPECIFIED) test strip     blood glucose monitoring (NO BRAND SPECIFIED) meter device kit     Continuous Blood Gluc Sensor (FREESTYLE CLAUDIA 2 SENSOR) MISC     insulin aspart (NOVOLOG PEN) 100 UNIT/ML pen     insulin degludec (TRESIBA FLEXTOUCH) 100 UNIT/ML pen     ketoconazole (NIZORAL) 2 % external shampoo     lamiVUDine (EPIVIR) 10 MG/ML solution     metoprolol succinate ER (TOPROL XL) 25 MG 24 hr tablet     Multiple Vitamin (TAB-A-GLADIS) TABS     mycophenolate (GENERIC EQUIVALENT) 250 MG capsule     ondansetron (ZOFRAN ODT) 8 MG ODT tab     pantoprazole (PROTONIX) 40 MG EC tablet     predniSONE (DELTASONE) 5 MG tablet     rosuvastatin (CRESTOR) 20 MG tablet     tamsulosin (FLOMAX) 0.4 MG capsule     Vitamin D3 50 mcg (2000 units) tablet     Current Facility-Administered Medications    Medication     aflibercept (EYLEA) injection prefilled syringe 2 mg     aflibercept (EYLEA) injection prefilled syringe 2 mg     dexamethasone (OZURDEX) intravitreal implant 0.7 mg     dexamethasone (OZURDEX) intravitreal implant 0.7 mg     faricimab-svoa (VABYSMO) intravitreal injection 6 mg     faricimab-svoa (VABYSMO) intravitreal injection 6 mg     lidocaine (PF) (XYLOCAINE) injection 1 mL       Medication Compliance:  Yes    Changes in Health Issues:   None reported    Chemical Use Review:   Substance Use: Chemical use reviewed, no active concerns identified      Tobacco Use: No current tobacco use.         Assessment: Current Emotional / Mental Status (status of significant symptoms):  Risk status (Self / Other harm or suicidal ideation)  Patient denies a history of suicidal ideation, suicide attempts, self-injurious behavior, homicidal ideation, homicidal behavior and and other safety concerns     Patient denies current fears or concerns for personal safety.  Patient denies current or recent suicidal ideation or behaviors.  Patient denies current or recent homicidal ideation or behaviors.  Patient denies current or recent self injurious behavior or ideation.  Patient denies other safety concerns.     A safety and risk management plan has not been developed at this time, however patient was encouraged to call [place] [phone] should there be a change in any of these risk factors.    [place] Suicide Severity Rating Scale (Short Version)      7/1/2020    11:00 AM 7/12/2021     2:30 PM 1/10/2022     9:28 PM 1/3/2023     6:31 AM 1/24/2023     6:22 AM 7/13/2023    10:31 AM 8/10/2023     3:31 PM   [place] Suicide Severity Rating (Short Version)   Over the past 2 weeks have you felt down, depressed, or hopeless? no no no no no no no   Over the past 2 weeks have you had thoughts of killing yourself? no no no no  no no   Have you ever attempted to kill yourself? no no no no  no no   Q1 Wished to be Dead (Past  Month)    no      Q2 Suicidal Thoughts (Past Month)    no      Q3 Suicidal Thought Method    no      Q4 Suicidal Intent without Specific Plan    no      Q5 Suicide Intent with Specific Plan    no      Q6 Suicide Behavior (Lifetime)    no      Level of Risk per Screen    low risk            Appearance:   Appropriate   Eye Contact:   Good   Psychomotor Behavior: TD evident   Attitude:   Cooperative  Interested Friendly Pleasant  Orientation:   All  Speech   Rate / Production: Normal/ Responsive   Volume:  Normal   Mood:    Depressed ; improving  Affect:    Appropriate    Thought Content:  Clear   Thought Form:  Goal Directed  Logical   Insight:    Good     Diagnoses:  1. Bipolar 1 disorder, depressed, mild (H)    2. Generalized anxiety disorder            Collateral Reports Completed:  Not Applicable    Plan: (Homework, other):  Continue with this writer for individual psychotherapy in 2/3 wks. He will continue to work on managing depression and anxiety through achievable behavioral activation ideas. Information about behavioral activation provided  Today he plans to continue walking 9k to 10k steps per day.  No CD issues.     Joseph Murillo Psy.D, LP   Behavioral Health Clinician   [facility]              ______________________________________________________________________                                            Individual Treatment Plan    Patient's Name: Frandy Workman  YOB: 1965    Date of Creation: 3/1/2022  Date Treatment Plan Last Reviewed/Revised: 8/4/2023    DSM5 Diagnoses: 296.51 Bipolar I Disorder Current or Most Recent Episode Depressed, Mild or 300.02 (F41.1) Generalized Anxiety Disorder  Psychosocial / Contextual Factors: Post Solid Organ Tx  PROMIS (reviewed every 90 days): 4    Referral / Collaboration:  Referral to another professional/service is not indicated at this time..    Anticipated number of session for this episode of care:  4-6  Anticipation frequency of session: Every other week  Anticipated Duration of each session: 38-52 minutes  Treatment plan will be reviewed in 90 days or when goals have been changed.       MeasurableTreatment Goal(s) related to diagnosis / functional impairment(s)  Goal 1: Patient will experience a reduction in depressive symptoms along with a corresponding increase in positive emotion and life satisfaction.      Objective #A (Patient Action)    Patient will Increase interest, engagement, and pleasure in doing things.  Status: Continued - Date(s): 8/4/2023    Intervention(s)  Therapist will help patient identify pleasant and mastery oriented events that elicit positive, relaxed mood.    Objective #B  Patient will Decrease frequency and intensity of feeling down, depressed, hopeless.  Status: Continued - Date(s): 8/4/2023    Intervention(s)  Therapist will introduce patient to cognitive-behavioral and acceptance and commitment therapy topics aimed to help reduce depression and anxiety    Objective #C  Patient will Identify negative self-talk and behaviors: challenge core beliefs, myths, and actions  Improve concentration, focus, and mindfulness in daily activities .  Status: Continued - Date(s): COMPLETE    Intervention(s)  Therapist will help patient identify and manage negative self-talk and automatic thoughts; introduce patient to cognitive distortions; help patient develop cognitive diffusion techniques      Goal 2: Patient will experience a reduction in anxious symptoms, along with a corresponding increase in relaxed emotional states and life satisfaction.      Objective #A (Patient Action)  Patient will use cognitive-behavioral and thought diffusion strategies identified in therapy to challenge anxious thoughts.    Status: Continued - Date(s): COMPLETE    Intervention(s)  Therapist will utilize CBT and ACT ideas to help patient challenge anxious thoughts and reduce intensity/duration of anxious  "distress    Objective #B  Patient will use \"worry time\" each day for 15 minutes of scheduled worry and then defer obsessive or anxious thinking until the next structured worry time.    Status: Continued - Date(s): COMPLETE    Intervention(s)  Therapist will teach patient how to effectively utilize worry time and/or thought logs/journals each day and incorporate more relaxation behaviors into their routine.    Objective #C  Patient will identify the stressors which contribute to feelings of anxiety  Patient will increase engagement in adaptive coping skills and recreational activities, such as exercise and healthy socialization, to manage distress.  Status: Continued - Date(s): COMPLETE    Intervention(s)  Therapist will help patient identify triggers/situational factors that contribute to anxiety and behavioral skills aimed to manage anxious distress.      Goal 3: Patiient will work toward adaptively managing bipolar-related depression and manic episodes      Objective #A (Patient Action)    Status: Continued - Date(s): COMPLETE    Patient will identify 2-3 signs or signals of emerging mood instability.    Intervention(s)  Therapist will provide educational materials on bipolar disorder and help identifying triggers for mood instability .    Objective #B  Patient will identify 2-3 strategies for more effectively managing Bipolar Disorder.    Status: Continued - Date(s): COMPLETE    Intervention(s)  Therapist will teach emotional recognition/identification. Skills aimed to effectively manage bipolar disorder .      Other Possible Therapeutic Intervention(s):    Psycho-education regarding mental health diagnoses and treatment options    Supportive Therapy    Provide affirmations, reflections, and establish working rapport    Emphasize and reflect on strength of therapeutic relationship    Skills training    Explore skills useful to client in current situation.    Skills include assertiveness, communication, conflict " management, problem-solving, relaxation, etc.    Solution-Focused Therapy    Explore patterns in patient's relationships and discuss options for new behaviors.    Explore patterns in patient's actions and choices and discuss options for new behaviors.    Cognitive-behavioral Therapy    Discuss common cognitive distortions, identify them in patient's life.    Explore ways to challenge, replace, and act against these cognitions.    Acceptance and Commitment Therapy    Explore and identify important values in patient's life.    Discuss ways to commit to behavioral activation around these values.    Psychodynamic psychotherapy    Discuss patient's emotional dynamics and issues and how they impact behaviors.    Explore patient's history of relationships and how they impact present behaviors.    Explore how to work with and make changes in these schemas and patterns.    Narrative Therapy    Explore the patient's story of his/her life from his/her perspective.    Explore alternate ways of understanding their experience, identifying exceptions, developing new themes.    Interpersonal Psychotherapy    Explore patterns in relationships that are effective or ineffective at helping patient reach their goals, find satisfying experience.    Discuss new patterns or behaviors to engage in for improved social functioning.    Behavioral Activation    Discuss steps patient can take to become more involved in meaningful activity.    Identify barriers to these activities and explore possible solutions.    Mindfulness-Based Strategies    Discuss skills based on development and application of mindfulness.    Skills drawn from compassion-focused therapy, dialectical behavior therapy, mindfulness-based stress reduction, mindfulness-based cognitive therapy, etc.      Patient has reviewed and agreed to the above plan.      Joseph Murillo Psy.D, LP   Behavioral Health Clinician   [facility]      8/4/2023  Answers for HPI/ROS submitted by the patient on 2/28/2023  If you checked off any problems, how difficult have these problems made it for you to do your work, take care of things at home, or get along with other people?: Very difficult  PHQ9 TOTAL SCORE: 6    Answers for HPI/ROS submitted by the patient on 6/28/2023  If you checked off any problems, how difficult have these problems made it for you to do your work, take care of things at home, or get along with other people?: Somewhat difficult  PHQ9 TOTAL SCORE: 3

## 2023-08-25 NOTE — PROGRESS NOTES
Reason for Visit: seen in follow-up of hepatocellular carcinoma    Oncology HPI:   Miller Workman is a 58 year old man with a PMH of DMII, bipolar disorder, cirrhosis s/t ESTRADA with subsequent development of HCC, s/p liver transplant , HLD, HTN, GERD, BPH and CAD with hx of 2 vessel CABG in July 2021, CKD with anemia of chronic disease, on erythropoetin.      He as diagnosed with hepatocellular carcinoma in December 2018 when he was found to have 2 LIRADS 5 lesions on surveillance imaging. He underwent TACE on 1/22/19.  He is s/p  liver transplant on 11/11/2019. The explanted liver had 2 lesions that were mostly necrotic and less than 3 cm with no clearly identifiable vascular invasion.      He was found to have peritoneal masses on routine surveillance imaging in June 2023. He underwent laparoscopy with biopsies and pericecal mass resection confirming metastatic HCC.     He met with Dr. Villarreal in July with recommendation to start sorafenib. He started 400 mg BID on 7/29/23. He presents for a toxicity assessment today.     He was admitted 8/10-8/16 with hyperkalemia, acute kidney injury. He required one round of hemodialysis. He was noted to have thrombocytopenia with a platelet count from 178 t9 97. A HIT VAHID was negative.   Renal function and hyperkalemia improved through the course of hospitalization. He was followed by nephrology. He was drinking 1.6 L of fluid.  Jardiance and lisinopril held. The cause of the JERONIMO was in the context of severe nausea and vomiting and diarrhea prior to admission.        Interval history:    Miller is feeling quite well. Energy and appetite are back to baseline. Urinating without difficulty. Drinking fluids. No nausea or vomiting. No diarrhea. No mouth sores or hand/foot tenderness while on sorafenib.   -has been working with endocrine to adjust his medications and monitoring. Had an episode of having a glucose reading of 50 yesterday. Was asymptomatic, did not have any glucose  available. He has now replaced a monitor that wasn't working and is also carrying glucose with him.  No fevers/chills, cough or shortness of breath.  -no abdominal pain or bloating  -no bleeding, bruising or rash or edema          Current Outpatient Medications   Medication Sig Dispense Refill    Alcohol Swabs (ALCOHOL PREP) 70 % PADS Use for glucose testing 4x daily  and insulin administration 4x daily. 400 each 1    ammonium lactate (AMLACTIN) 12 % external cream Apply topically daily as needed for dry skin (on feet)      aspirin (SM ASPIRIN ADULT LOW STRENGTH) 81 MG EC tablet Take 2 tablets (162 mg) by mouth daily 180 tablet 3    BD VIKTORIA U/F 32G X 4 MM insulin pen needle Use 5 per day 300 each 3    benzoyl peroxide 5 % external liquid Use topically in showers as a body wash 226 g 11    blood glucose (NO BRAND SPECIFIED) lancets standard Use to test blood sugar 1 times daily or as directed. 100 each 11    blood glucose (NO BRAND SPECIFIED) test strip Use to test blood sugar 1 times daily or as directed. 100 strip 3    blood glucose monitoring (NO BRAND SPECIFIED) meter device kit Use to test blood sugar 1 times daily or as directed. 1 kit 1    Continuous Blood Gluc Sensor (FREESTYLE CLAUDIA 2 SENSOR) MISC 1 each See Admin Instructions Change every 14 days. 7 each 3    insulin aspart (NOVOLOG PEN) 100 UNIT/ML pen Inject 5-10 Units Subcutaneous 4 times daily (with meals and nightly) 1unit : 8 g carb before meals.  Also add 1 unit : 50 mg/dl >180 before meals and at bedtime. 45 mL 3    insulin degludec (TRESIBA FLEXTOUCH) 100 UNIT/ML pen Inject 34 units subcutaneous once daily 45 mL 3    ketoconazole (NIZORAL) 2 % external shampoo Apply thin layer topically to scalp in shower (leave on 5 min prior to rinse); may also use as a body wash 120 mL 11    lamiVUDine (EPIVIR) 10 MG/ML solution Take 5 mLs (50 mg) by mouth daily 240 mL 0    metoprolol succinate ER (TOPROL XL) 25 MG 24 hr tablet Take 0.5 tablets (12.5 mg) by  mouth daily 45 tablet 1    Multiple Vitamin (TAB-A-GLADIS) TABS TAKE ONE TABLET BY MOUTH ONCE DAILY 90 tablet 0    mycophenolate (GENERIC EQUIVALENT) 250 MG capsule Take 2 capsules (500 mg) by mouth every 12 hours 120 capsule 11    ondansetron (ZOFRAN ODT) 8 MG ODT tab Take 1 tablet (8 mg) by mouth every 8 hours as needed for nausea 15 tablet 1    pantoprazole (PROTONIX) 40 MG EC tablet Take 1 tablet (40 mg) by mouth daily before breakfast 90 tablet 3    predniSONE (DELTASONE) 5 MG tablet Take 1 tablet (5 mg) by mouth daily 90 tablet 3    rosuvastatin (CRESTOR) 20 MG tablet Take 1 tablet (20 mg) by mouth daily 90 tablet 3    Vitamin D3 50 mcg (2000 units) tablet Take 1 tablet (50 mcg) by mouth daily 90 tablet 3    tamsulosin (FLOMAX) 0.4 MG capsule Take 1 capsule (0.4 mg) by mouth daily 90 capsule 3          Allergies   Allergen Reactions    Codeine Other (See Comments)     Cannot take due to liver  Cannot tolerate oral narcotics    Seasonal Allergies      Sneezing, coughing, runny and itchy eyes           Physical Exam:  General: The patient is a pleasant male in no acute distress.  /81 (BP Location: Right arm, Patient Position: Sitting, Cuff Size: Adult Regular)   Pulse 104   Temp 98.6  F (37  C) (Oral)   Resp 16   Wt 83.3 kg (183 lb 9.6 oz)   SpO2 97%   BMI 27.11 kg/m    Wt Readings from Last 10 Encounters:   08/25/23 83.3 kg (183 lb 9.6 oz)   08/15/23 83.1 kg (183 lb 4.8 oz)   08/10/23 82.6 kg (182 lb)   08/09/23 84.9 kg (187 lb 3.2 oz)   07/26/23 88.5 kg (195 lb)   07/20/23 89.8 kg (198 lb)   07/13/23 92.4 kg (203 lb 11.3 oz)   07/03/23 92.9 kg (204 lb 14.4 oz)   06/15/23 90.3 kg (199 lb)   05/17/23 95.4 kg (210 lb 4.8 oz)   HEENT: EOMI. Sclerae are anicteric.   Lymph: Neck is supple with no lymphadenopathy in the cervical or supraclavicular areas.   Heart: Regular rate and rhythm.   Lungs: Clear to auscultation bilaterally.   Abdomen: Bowel sounds present, soft, nontender with no palpable  hepatosplenomegaly or masses.   Extremities: No lower extremity edema noted bilaterally.   Neuro: Cranial nerves II through XII are grossly intact.  Skin: No rashes, petechiae, or bruising noted on exposed skin.      Labs:   Most Recent 3 CBC's:  Recent Labs   Lab Test 08/24/23  0934 08/18/23  1146 08/16/23  0601 08/15/23  0505 08/14/23  1043 08/10/23  1844 08/10/23  1833   WBC 4.7 6.7 4.0   < > 6.0   < > 8.8   HGB 10.2* 11.0* 10.3*   < > 12.3*   < > 11.9*   MCV 97 97 95   < > 97   < > 102*    129* 97*   < > 104*   < > 164   ANEUTAUTO 3.5  --   --   --  5.0  --  7.1    < > = values in this interval not displayed.     Most Recent 3 BMP's:  Recent Labs   Lab Test 08/24/23  0934 08/18/23  1146 08/16/23  1200 08/16/23  0748 08/16/23  0601 08/10/23  2159 08/10/23  2151 08/10/23  1844 08/10/23  1833    140  --   --  138   < > 141   < > 138   POTASSIUM 4.6 4.6  --   --  4.6   < > 5.9*   < > 8.0*   CHLORIDE 106 106  --   --  107   < > 116*  --  112*   CO2 20* 19*  --   --  17*   < > 9*  --  8*   BUN 35.2* 61.5*  --   --  45.1*   < > 105.0*  --  117.0*   CR 1.71* 2.84*  --   --  1.84*   < > 6.77*  --  7.38*   ANIONGAP 13 15  --   --  14   < > 16*  --  18*   DEBORAH 9.3 9.3  --   --  8.6   < > 7.9*  --  8.6   * 129* 185*   < > 204*   < > 113*   < > 130*   PROTTOTAL 6.8  --   --   --   --   --  6.0*  --  7.3   ALBUMIN 4.4  --   --   --   --   --  3.4*  --  4.3    < > = values in this interval not displayed.    Most Recent 3 LFT's:  Recent Labs   Lab Test 08/24/23  0934 08/10/23  2151 08/10/23  1833   AST 18 25 22   ALT 20 16 26   ALKPHOS 77 84 108   BILITOTAL 0.2 0.2 0.3       I reviewed the above labs today.        Impression/plan:   Hepatocellular carcinoma, s/p prior TACE and liver transplantation in November 2019. Now with metastatic disease to peritoneum.   -started sorafenib 400 mg BID on 7/29.  Tolerated the first several days well, except for some diarrhea. Then developed acute N/V 4 days prior to  admission for renal failure and hyperkalemia. There doesn't appear to be another cause of renal failure identified. The diarrhea, N/V are common side effects of sorafenib and have resolved since he discontinued the medication.  He didn't have other typical side effects of hand/foot syndrome or mucositis, so unclear if another cause (ie. Viral gastroenteritis) might have also contributed  -he appears to have fully recovered. Discussed a possible plan for resuming sorafenib, but at a lower dose with close monitoring of his renal function and electrolytes  -would start at 200 mg daily and then increased to bid dosing if tolerating after a week.  He plans to be out of town until after Labor Day and it is reasonable to consider resuming when he returns.  -alternative therapies to treat his cancer would include other TKIs which would carry similar side effects    Thrombocytopenia, likely s/t sorafenib, no evidence of HIT on kassandra. Improved on recent labs    Acute on chronic kidney disease  -recent admission with JERONIMO, hyperkalemia, s/p 1 run of hemodialysis. Improving kidney function, nearing baseline  -follow with Dr. Rao on 9/15    Hx of liver transplant  -immunosuppression with MMF, prednisone as per Dr. Banuelos    CAD s/p 2 vessel CABG 2021  -remains on rosuvastatin, metoprlol. Holding lisinopril with recent renal failure    Bipolar disorder. Following with Dr. Murillo, seen today

## 2023-08-25 NOTE — LETTER
8/25/2023         RE: Frandy Workman  [address] [facility] [address]        Dear Colleague,    Thank you for referring your patient, Frandy Workman, to the [facility]. Please see a copy of my visit note below.      Reason for Visit: seen in follow-up of hepatocellular carcinoma    Oncology HPI:   Miller Workman is a 58 year old man with a PMH of DMII, bipolar disorder, cirrhosis s/t ESTRADA with subsequent development of HCC, s/p liver transplant , HLD, HTN, GERD, BPH and CAD with hx of 2 vessel CABG in July 2021, CKD with anemia of chronic disease, on erythropoetin.      He as diagnosed with hepatocellular carcinoma in December 2018 when he was found to have 2 LIRADS 5 lesions on surveillance imaging. He underwent TACE on 1/22/19.  He is s/p  liver transplant on 11/11/2019. The explanted liver had 2 lesions that were mostly necrotic and less than 3 cm with no clearly identifiable vascular invasion.      He was found to have peritoneal masses on routine surveillance imaging in June 2023. He underwent laparoscopy with biopsies and pericecal mass resection confirming metastatic HCC.     He met with Dr. Villarreal in July with recommendation to start sorafenib. He started 400 mg BID on 7/29/23. He presents for a toxicity assessment today.     He was admitted 8/10-8/16 with hyperkalemia, acute kidney injury. He required one round of hemodialysis. He was noted to have thrombocytopenia with a platelet count from 178 t9 97. A HIT VAHID was negative.   Renal function and hyperkalemia improved through the course of hospitalization. He was followed by nephrology. He was drinking 1.6 L of fluid.  Jardiance and lisinopril held. The cause of the JERONIMO was in the context of severe nausea and vomiting and diarrhea prior to admission.        Interval history:    Miller is feeling quite well. Energy and appetite are back to baseline. Urinating without difficulty. Drinking fluids. No nausea or vomiting. No  diarrhea. No mouth sores or hand/foot tenderness while on sorafenib.   -has been working with endocrine to adjust his medications and monitoring. Had an episode of having a glucose reading of 50 yesterday. Was asymptomatic, did not have any glucose available. He has now replaced a monitor that wasn't working and is also carrying glucose with him.  No fevers/chills, cough or shortness of breath.  -no abdominal pain or bloating  -no bleeding, bruising or rash or edema          Current Outpatient Medications   Medication Sig Dispense Refill    Alcohol Swabs (ALCOHOL PREP) 70 % PADS Use for glucose testing 4x daily  and insulin administration 4x daily. 400 each 1    ammonium lactate (AMLACTIN) 12 % external cream Apply topically daily as needed for dry skin (on feet)      aspirin (SM ASPIRIN ADULT LOW STRENGTH) 81 MG EC tablet Take 2 tablets (162 mg) by mouth daily 180 tablet 3    BD VIKTORIA U/F 32G X 4 MM insulin pen needle Use 5 per day 300 each 3    benzoyl peroxide 5 % external liquid Use topically in showers as a body wash 226 g 11    blood glucose (NO BRAND SPECIFIED) lancets standard Use to test blood sugar 1 times daily or as directed. 100 each 11    blood glucose (NO BRAND SPECIFIED) test strip Use to test blood sugar 1 times daily or as directed. 100 strip 3    blood glucose monitoring (NO BRAND SPECIFIED) meter device kit Use to test blood sugar 1 times daily or as directed. 1 kit 1    Continuous Blood Gluc Sensor (FREESTYLE CLAUDIA 2 SENSOR) [facility] 1 each See Admin Instructions Change every 14 days. 7 each 3    insulin aspart (NOVOLOG PEN) 100 UNIT/ML pen Inject 5-10 Units Subcutaneous 4 times daily (with meals and nightly) 1unit : 8 g carb before meals.  Also add 1 unit : 50 mg/dl >180 before meals and at bedtime. 45 mL 3    insulin degludec (TRESIBA FLEXTOUCH) 100 UNIT/ML pen Inject 34 units subcutaneous once daily 45 mL 3    ketoconazole (NIZORAL) 2 % external shampoo Apply thin layer topically to scalp in  shower (leave on 5 min prior to rinse); may also use as a body wash 120 mL 11    lamiVUDine (EPIVIR) 10 MG/ML solution Take 5 mLs (50 mg) by mouth daily 240 mL 0    metoprolol succinate ER (TOPROL XL) 25 MG 24 hr tablet Take 0.5 tablets (12.5 mg) by mouth daily 45 tablet 1    Multiple Vitamin (TAB-A-GLADIS) TABS TAKE ONE TABLET BY MOUTH ONCE DAILY 90 tablet 0    mycophenolate (GENERIC EQUIVALENT) 250 MG capsule Take 2 capsules (500 mg) by mouth every 12 hours 120 capsule 11    ondansetron (ZOFRAN ODT) 8 MG ODT tab Take 1 tablet (8 mg) by mouth every 8 hours as needed for nausea 15 tablet 1    pantoprazole (PROTONIX) 40 MG EC tablet Take 1 tablet (40 mg) by mouth daily before breakfast 90 tablet 3    predniSONE (DELTASONE) 5 MG tablet Take 1 tablet (5 mg) by mouth daily 90 tablet 3    rosuvastatin (CRESTOR) 20 MG tablet Take 1 tablet (20 mg) by mouth daily 90 tablet 3    Vitamin D3 50 mcg (2000 units) tablet Take 1 tablet (50 mcg) by mouth daily 90 tablet 3    tamsulosin (FLOMAX) 0.4 MG capsule Take 1 capsule (0.4 mg) by mouth daily 90 capsule 3          Allergies   Allergen Reactions    Codeine Other (See Comments)     Cannot take due to liver  Cannot tolerate oral narcotics    Seasonal Allergies      Sneezing, coughing, runny and itchy eyes           Physical Exam:  General: The patient is a pleasant male in no acute distress.  /81 (BP Location: Right arm, Patient Position: Sitting, Cuff Size: Adult Regular)   Pulse 104   Temp 98.6  F (37  C) (Oral)   Resp 16   Wt 83.3 kg (183 lb 9.6 oz)   SpO2 97%   BMI 27.11 kg/m    Wt Readings from Last 10 Encounters:   08/25/23 83.3 kg (183 lb 9.6 oz)   08/15/23 83.1 kg (183 lb 4.8 oz)   08/10/23 82.6 kg (182 lb)   08/09/23 84.9 kg (187 lb 3.2 oz)   07/26/23 88.5 kg (195 lb)   07/20/23 89.8 kg (198 lb)   07/13/23 92.4 kg (203 lb 11.3 oz)   07/03/23 92.9 kg (204 lb 14.4 oz)   06/15/23 90.3 kg (199 lb)   05/17/23 95.4 kg (210 lb 4.8 oz)   HEENT: EOMI. Sclerae are  anicteric.   Lymph: Neck is supple with no lymphadenopathy in the cervical or supraclavicular areas.   Heart: Regular rate and rhythm.   Lungs: Clear to auscultation bilaterally.   Abdomen: Bowel sounds present, soft, nontender with no palpable hepatosplenomegaly or masses.   Extremities: No lower extremity edema noted bilaterally.   Neuro: Cranial nerves II through XII are grossly intact.  Skin: No rashes, petechiae, or bruising noted on exposed skin.      Labs:   Most Recent 3 CBC's:  Recent Labs   Lab Test 08/24/23  0934 08/18/23  1146 08/16/23  0601 08/15/23  0505 08/14/23  1043 08/10/23  1844 08/10/23  1833   WBC 4.7 6.7 4.0   < > 6.0   < > 8.8   HGB 10.2* 11.0* 10.3*   < > 12.3*   < > 11.9*   MCV 97 97 95   < > 97   < > 102*    129* 97*   < > 104*   < > 164   ANEUTAUTO 3.5  --   --   --  5.0  --  7.1    < > = values in this interval not displayed.     Most Recent 3 BMP's:  Recent Labs   Lab Test 08/24/23  0934 08/18/23  1146 08/16/23  1200 08/16/23  0748 08/16/23  0601 08/10/23  2159 08/10/23  2151 08/10/23  1844 08/10/23  1833    140  --   --  138   < > 141   < > 138   POTASSIUM 4.6 4.6  --   --  4.6   < > 5.9*   < > 8.0*   CHLORIDE 106 106  --   --  107   < > 116*  --  112*   CO2 20* 19*  --   --  17*   < > 9*  --  8*   BUN 35.2* 61.5*  --   --  45.1*   < > 105.0*  --  117.0*   CR 1.71* 2.84*  --   --  1.84*   < > 6.77*  --  7.38*   ANIONGAP 13 15  --   --  14   < > 16*  --  18*   DEBORAH 9.3 9.3  --   --  8.6   < > 7.9*  --  8.6   * 129* 185*   < > 204*   < > 113*   < > 130*   PROTTOTAL 6.8  --   --   --   --   --  6.0*  --  7.3   ALBUMIN 4.4  --   --   --   --   --  3.4*  --  4.3    < > = values in this interval not displayed.    Most Recent 3 LFT's:  Recent Labs   Lab Test 08/24/23  0934 08/10/23  2151 08/10/23  1833   AST 18 25 22   ALT 20 16 26   ALKPHOS 77 84 108   BILITOTAL 0.2 0.2 0.3       I reviewed the above labs today.        Impression/plan:   Hepatocellular carcinoma, s/p prior  TACE and liver transplantation in November 2019. Now with metastatic disease to peritoneum.   -started sorafenib 400 mg BID on 7/29.  Tolerated the first several days well, except for some diarrhea. Then developed acute N/V 4 days prior to admission for renal failure and hyperkalemia. There doesn't appear to be another cause of renal failure identified. The diarrhea, N/V are common side effects of sorafenib and have resolved since he discontinued the medication.  He didn't have other typical side effects of hand/foot syndrome or mucositis, so unclear if another cause (ie. Viral gastroenteritis) might have also contributed  -he appears to have fully recovered. Discussed a possible plan for resuming sorafenib, but at a lower dose with close monitoring of his renal function and electrolytes  -would start at 200 mg daily and then increased to bid dosing if tolerating after a week.  He plans to be out of town until after Labor Day and it is reasonable to consider resuming when he returns.  -alternative therapies to treat his cancer would include other TKIs which would carry similar side effects    Thrombocytopenia, likely s/t sorafenib, no evidence of HIT on kassandra. Improved on recent labs    Acute on chronic kidney disease  -recent admission with JERONIMO, hyperkalemia, s/p 1 run of hemodialysis. Improving kidney function, nearing baseline  -follow with Dr. Rao on 9/15    Hx of liver transplant  -immunosuppression with MMF, prednisone as per Dr. Banuelos    CAD s/p 2 vessel CABG 2021  -remains on rosuvastatin, metoprlol. Holding lisinopril with recent renal failure    Bipolar disorder. Following with Dr. Murillo, seen today    Again, thank you for allowing me to participate in the care of your patient.        Sincerely,        SHANIQUE Liz CNP

## 2023-08-25 NOTE — NURSING NOTE
"Oncology Rooming Note    August 25, 2023 3:41 PM   Frandy Workman is a 58 year old male who presents for:    Chief Complaint   Patient presents with    Oncology Clinic Visit     Hepatocellular carcinoma; malignant neoplasm metastatic to peritoneum     Initial Vitals: /81 (BP Location: Right arm, Patient Position: Sitting, Cuff Size: Adult Regular)   Pulse 104   Temp 98.6  F (37  C) (Oral)   Resp 16   Wt 83.3 kg (183 lb 9.6 oz)   SpO2 97%   BMI 27.11 kg/m   Estimated body mass index is 27.11 kg/m  as calculated from the following:    Height as of 8/10/23: 1.753 m (5' 9\").    Weight as of this encounter: 83.3 kg (183 lb 9.6 oz). Body surface area is 2.01 meters squared.  No Pain (0) Comment: Data Unavailable   No LMP for male patient.  Allergies reviewed: Yes  Medications reviewed: Yes    Medications: MEDICATION REFILLS NEEDED TODAY. Provider was notified.  Pharmacy name entered into [facility]:    [facility] - [place], [place] - [address] [facility] - [place], [place] - [address] [facility] [phone]    Clinical concerns: Pt requests mycophenolate and prednisone refills.      Miller Tovar"

## 2023-08-29 ENCOUNTER — OFFICE VISIT (OUTPATIENT)
Dept: FAMILY MEDICINE | Facility: CLINIC | Age: 58
End: 2023-08-29
Payer: MEDICARE

## 2023-08-29 VITALS
OXYGEN SATURATION: 95 % | DIASTOLIC BLOOD PRESSURE: 74 MMHG | TEMPERATURE: 98.5 F | WEIGHT: 183.4 LBS | HEART RATE: 98 BPM | BODY MASS INDEX: 27.16 KG/M2 | HEIGHT: 69 IN | SYSTOLIC BLOOD PRESSURE: 132 MMHG

## 2023-08-29 DIAGNOSIS — Z09 HOSPITAL DISCHARGE FOLLOW-UP: ICD-10-CM

## 2023-08-29 DIAGNOSIS — N18.31 CHRONIC KIDNEY DISEASE, STAGE 3A (H): ICD-10-CM

## 2023-08-29 DIAGNOSIS — E11.3293 TYPE 2 DIABETES MELLITUS WITH MILD NONPROLIFERATIVE RETINOPATHY OF BOTH EYES WITHOUT MACULAR EDEMA, UNSPECIFIED WHETHER LONG TERM INSULIN USE (H): ICD-10-CM

## 2023-08-29 DIAGNOSIS — C78.6 MALIGNANT NEOPLASM METASTATIC TO PERITONEUM (H): Primary | ICD-10-CM

## 2023-08-29 LAB
ATRIAL RATE - MUSE: 101 BPM
DIASTOLIC BLOOD PRESSURE - MUSE: NORMAL MMHG
INTERPRETATION ECG - MUSE: NORMAL
P AXIS - MUSE: -1 DEGREES
PR INTERVAL - MUSE: 120 MS
QRS DURATION - MUSE: 80 MS
QT - MUSE: 336 MS
QTC - MUSE: 435 MS
R AXIS - MUSE: 20 DEGREES
SYSTOLIC BLOOD PRESSURE - MUSE: NORMAL MMHG
T AXIS - MUSE: 43 DEGREES
VENTRICULAR RATE- MUSE: 101 BPM

## 2023-08-29 PROCEDURE — 99214 OFFICE O/P EST MOD 30 MIN: CPT | Performed by: FAMILY MEDICINE

## 2023-08-29 ASSESSMENT — PAIN SCALES - GENERAL: PAINLEVEL: MODERATE PAIN (4)

## 2023-08-29 NOTE — NURSING NOTE
Frandy Workman is a 58 year old male that presents in clinic today for the following:     Chief Complaint   Patient presents with    Hospital F/U     Pt was in ICU for 10 days on 08/10/2023. Kidney failure, unknown cause       The patient's allergies and medications were reviewed. The patient's vitals were obtained without incident. The patient does not have any other questions for the provider.     Ruth Chaudhry, EMT at 10:16 AM on 8/29/2023.  Primary Care Clinic: [phone]

## 2023-08-29 NOTE — PROGRESS NOTES
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 Lives alone requests help at home for general medical issues, will start w/ CP  - Community Paramedic Referral; Future    Chronic kidney disease, stage 3a (H)  Renal notes rvwd  - Community Paramedic Referral; Future    Type 2 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, unspecified whether long term insulin use (H)  Works w/ Endo, recent epidose hypoglycemia  - Community Paramedic Referral; Future    Hospital discharge follow-up  Reviewed      37 minutes spent by me on the date of the encounter doing chart review, history and exam, documentation and further activities per the note     Discharge medications reconciled, continue medications without change  BMI:   Estimated body mass index is 27.08 kg/m  as calculated from the following:    Height as of this encounter: 1.753 m (5' 9\").    Weight as of this encounter: 83.2 kg (183 lb 6.4 oz).     No follow-ups on file.    Lamin Ortiz MD  [facility] [place]    Nathaly Bhatt is a 58 year old, presenting for the following health issues:  Hospital F/U (Pt was in ICU for 10 days on 08/10/2023. Kidney failure, unknown cause)    HPI Here for a follow-up inpt stay and renal issues.   Going out of town for the next six days has onco follow-up then  Lengthy discussion of cancer, kidney disease, and DM; he'd like help at home, wonders about HHN, is not home bound, he'll start with CP.  Aug 10-16 inpt [facility] re: JERONIMO.  Now no GI sx.   No new labs or studies or referrals needed, rvwd upcoming appts  Past Medical History:   Diagnosis Date    Anemia 2013    Arthritis     BPH (benign prostatic hyperplasia)     CAD (coronary artery disease) 04/01/2019    Cholelithiasis     Conductive hearing loss 08/16/2017    Depressive disorder 1986    Suffer effects throughout life    Gastroesophageal reflux disease " 12/01/2014    HCC (hepatocellular carcinoma) (H) 01/22/2019    History of diabetic retinopathy 07/2018    HTN (hypertension) 11/20/2019    Hyperlipidemia     Liver cirrhosis secondary to ESTRADA (H)     Liver transplanted (H) 11/11/2019    Portal vein thrombosis 04/11/2019    Type II diabetes mellitus (H)      Past Surgical History:   Procedure Laterality Date    BYPASS GRAFT ARTERY CORONARY N/A 7/14/2021    Procedure: median sternotomy, on cardiopulmonary bypass, CORONARY ARTERY BYPASS GRAFT (CABG) x2 with left greater saphenous vein endoscopic harvest and left internal mammery artery harvest;  Surgeon: Tom Zapata MD;  [facility]    COLONOSCOPY      2015    COLONOSCOPY N/A 12/6/2019    Procedure: COLONOSCOPY, WITH POLYPECTOMY AND BIOPSY;  Surgeon: Adam Morton MD;  [facility]    CV CENTRAL VENOUS CATHETER PLACEMENT N/A 7/12/2021    Procedure: Central Venous Catheter Placement;  Surgeon: Fermin Polanco MD;  [facility]    CV CORONARY ANGIOGRAM N/A 7/12/2021    Procedure: Coronary Angiogram;  Surgeon: Fermin Polanco MD;  [facility]    CV HEART CATHETERIZATION WITH POSSIBLE INTERVENTION N/A 2/26/2019    Procedure: CORS;  Surgeon: Jagdish Hoyt MD;  [facility]    CV INTRA AORTIC BALLOON N/A 7/12/2021    Procedure: Intra Aortic Balloon Pump Insertion;  Surgeon: Fermin Polanco MD;  Location[facility]    ESOPHAGOSCOPY, GASTROSCOPY, DUODENOSCOPY (EGD), COMBINED N/A 11/17/2016    Procedure: COMBINED ESOPHAGOSCOPY, GASTROSCOPY, DUODENOSCOPY (EGD);  Surgeon: Santi Rosas MD;  [facility]    ESOPHAGOSCOPY, GASTROSCOPY, DUODENOSCOPY (EGD), COMBINED N/A 11/17/2017    Procedure: COMBINED ESOPHAGOSCOPY, GASTROSCOPY, DUODENOSCOPY (EGD);  EGD;  Surgeon: Santi Rosas MD;  Location:  GI    ESOPHAGOSCOPY, GASTROSCOPY, DUODENOSCOPY (EGD),  COMBINED N/A 12/28/2018    Procedure: EGD;  Surgeon: Santi Rosas MD;  [facility]    ESOPHAGOSCOPY, GASTROSCOPY, DUODENOSCOPY (EGD), COMBINED N/A 12/6/2019    Procedure: ESOPHAGOGASTRODUODENOSCOPY, WITH BIOPSY;  Surgeon: Adam Morton MD;  [facility]    ESOPHAGOSCOPY, GASTROSCOPY, DUODENOSCOPY (EGD), COMBINED N/A 2/13/2020    Procedure: ESOPHAGOGASTRODUODENOSCOPY (EGD);  Surgeon: Santi Rosas MD;  [facility]    EXCISE MASS ABDOMEN N/A 7/13/2023    Procedure: Laparoscopic lysis of adhesions, laparoscopic resection of abdominal mass;  Surgeon: Ruiz Chu MD;  [facility]    HEAD & NECK SURGERY      12/2017 at [facility].     IMPLANT GOLD WEIGHT EYELID Right 11/16/2017    Procedure: IMPLANT WEIGHT EYELID;  Right Upper Eyelid Weight, right tarsal strip lower eyelid;  Surgeon: Milana Malave MD;  [facility]    IR CHEMO EMBOLIZATION  1/22/2019    KNEE SURGERY Left     ORTHOPEDIC SURGERY      PAROTIDECTOMY, RADICAL NECK DISSECTION Right 11/2/2017    Procedure: PAROTIDECTOMY, RADICAL NECK DISSECTION;  Right Superfacial Parotidectomy , Facial nerve repair. with [facility] facial nerve monitor.;  Surgeon: Asiya Morgan MD;  [facility]    PHACOEMULSIFICATION CLEAR CORNEA WITH STANDARD INTRAOCULAR LENS IMPLANT Right 1/24/2023    Procedure: PHACOEMULSIFICATION, COMPLEX CATARACT, WITH INTRAOCULAR LENS IMPLANT WITH TRYPAN RIGHT EYE;  Surgeon: Enriqueta Martin MD;  [place] [facility]    PHACOEMULSIFICATION WITH STANDARD INTRAOCULAR LENS IMPLANT Left 1/3/2023    Procedure: PHACOEMULSIFICATION, COMPLEX CATARACT, WITH STANDARD INTRAOCULAR LENS IMPLANT INSERTION LEFT EYE;  Surgeon: Enriqueta Martin MD;  [facility]    PICC INSERTION Left 11/06/2017    4fr SL BioFlo PICC, 44cm in the L basilic vein w/ tip in the low SVC    RETURN LIVER TRANSPLANT N/A 11/12/2019    Procedure: Exploratory laparotomy, hematoma evacuation, abdominal washout;  Surgeon:  Александр Ramos MD;  [facility]    TRANSPLANT LIVER RECIPIENT  DONOR N/A 2019    Procedure: TRANSPLANT, LIVER, RECIPIENT,  DONOR;  Surgeon: Александр Ramos MD;  [facility]    VASCULAR SURGERY       Current Outpatient Medications   Medication    Alcohol Swabs (ALCOHOL PREP) 70 % PADS    ammonium lactate (AMLACTIN) 12 % external cream    aspirin (SM ASPIRIN ADULT LOW STRENGTH) 81 MG EC tablet    BD VIKTORIA U/F 32G X 4 MM insulin pen needle    benzoyl peroxide 5 % external liquid    blood glucose (NO BRAND SPECIFIED) lancets standard    blood glucose (NO BRAND SPECIFIED) test strip    blood glucose monitoring (NO BRAND SPECIFIED) meter device kit    Continuous Blood Gluc Sensor (FREESTYLE CLAUDAI 2 SENSOR) MISC    insulin aspart (NOVOLOG PEN) 100 UNIT/ML pen    insulin degludec (TRESIBA FLEXTOUCH) 100 UNIT/ML pen    ketoconazole (NIZORAL) 2 % external shampoo    lamiVUDine (EPIVIR) 10 MG/ML solution    metoprolol succinate ER (TOPROL XL) 25 MG 24 hr tablet    Multiple Vitamin (TAB-A-GLADIS) TABS    mycophenolate (GENERIC EQUIVALENT) 250 MG capsule    ondansetron (ZOFRAN ODT) 8 MG ODT tab    pantoprazole (PROTONIX) 40 MG EC tablet    predniSONE (DELTASONE) 5 MG tablet    rosuvastatin (CRESTOR) 20 MG tablet    tamsulosin (FLOMAX) 0.4 MG capsule    Vitamin D3 50 mcg (2000 units) tablet     Current Facility-Administered Medications   Medication    aflibercept (EYLEA) injection prefilled syringe 2 mg    aflibercept (EYLEA) injection prefilled syringe 2 mg    dexamethasone (OZURDEX) intravitreal implant 0.7 mg    dexamethasone (OZURDEX) intravitreal implant 0.7 mg    faricimab-svoa (VABYSMO) intravitreal injection 6 mg    faricimab-svoa (VABYSMO) intravitreal injection 6 mg    lidocaine (PF) (XYLOCAINE) injection 1 mL     Allergies   Allergen Reactions    Codeine Other (See Comments)     Cannot take due to liver  Cannot tolerate oral narcotics    Seasonal Allergies      Sneezing, coughing, runny  "and itchy eyes     No med changes since last [facility] provider visit    No acute c/o                Review of Systems     Objective    /74 (BP Location: Right arm, Patient Position: Sitting, Cuff Size: Adult Regular)   Pulse 98   Temp 98.5  F (36.9  C) (Oral)   Ht 1.753 m (5' 9\")   Wt 83.2 kg (183 lb 6.4 oz)   SpO2 95%   BMI 27.08 kg/m    Body mass index is 27.08 kg/m .  Physical Exam   GENERAL: healthy, alert and no distress  MS: no gross musculoskeletal defects noted, no edema                  "

## 2023-08-30 ENCOUNTER — PATIENT OUTREACH (OUTPATIENT)
Dept: CARE COORDINATION | Facility: CLINIC | Age: 58
End: 2023-08-30

## 2023-08-30 NOTE — PROGRESS NOTES
Community Paramedic Program  Community Health Worker Initial Outreach      Referral source:   Question Answer   Reason(s) for visit: ED/Hospital Follow-Up    Initial Assessment    Med Check/Setup    Admission/Readmission Avoidance    Home/Safety Assessment   Goals for visit(s): Hemoglobin A1C Stabilization    Stabilization of Blood Sugars    Medication Compliance   How often should patient be seen: Weekly   Preference on when patient should be seen: Within 2 Weeks             Initial visit: 9/11/23 @ 11:30 am            Additional information: No pets, [facility], call patient before you leave to verify parking and let him know because he likes to go on walks, also very kind and grateful gentleman

## 2023-08-30 NOTE — Clinical Note
Thank you for the referral to the Community Paramedic program. I spoke with Miller Workman, and our CP Marsha Mclaughlin is scheduled to visit on 9/11/23 @ 11:30 am. Let me know if you have any questions.   Elaina Peters Community Health Worker [facility] Care Resource [facility] [phone]

## 2023-08-31 ENCOUNTER — TELEPHONE (OUTPATIENT)
Dept: TRANSPLANT | Facility: CLINIC | Age: 58
End: 2023-08-31
Payer: MEDICARE

## 2023-08-31 DIAGNOSIS — N18.32 STAGE 3B CHRONIC KIDNEY DISEASE (CKD) (H): Primary | ICD-10-CM

## 2023-08-31 NOTE — TELEPHONE ENCOUNTER
Spoke with patient. He is currently in [place] with his family. He is enjoying his time with them. He did say he is thinking he may not want to restart sorafenib, but he will talk to his family about it and oncology about it.     He is so scared after being in ICU when he came in. He did talk to Joseph Murillo which is helpful to him.     His blood sugars are managed and under control.     Confirmed patient is taking epivir 50 mg daily still. Message to Donald to confirm dosing based on kidney function.   Pt prefers to wait to increase dosing if he can until he returns from [place] if any dose change occurs.

## 2023-09-06 DIAGNOSIS — C78.6 MALIGNANT NEOPLASM METASTATIC TO PERITONEUM (H): ICD-10-CM

## 2023-09-06 DIAGNOSIS — C22.0 HCC (HEPATOCELLULAR CARCINOMA) (H): Primary | ICD-10-CM

## 2023-09-07 ENCOUNTER — TELEPHONE (OUTPATIENT)
Dept: TRANSPLANT | Facility: CLINIC | Age: 58
End: 2023-09-07

## 2023-09-07 ENCOUNTER — LAB (OUTPATIENT)
Dept: LAB | Facility: CLINIC | Age: 58
End: 2023-09-07
Payer: MEDICARE

## 2023-09-07 DIAGNOSIS — N18.32 STAGE 3B CHRONIC KIDNEY DISEASE (CKD) (H): ICD-10-CM

## 2023-09-07 DIAGNOSIS — Z95.1 S/P CABG (CORONARY ARTERY BYPASS GRAFT): ICD-10-CM

## 2023-09-07 DIAGNOSIS — C78.6 MALIGNANT NEOPLASM METASTATIC TO PERITONEUM (H): ICD-10-CM

## 2023-09-07 DIAGNOSIS — Z94.4 LIVER REPLACED BY TRANSPLANT (H): Primary | ICD-10-CM

## 2023-09-07 DIAGNOSIS — Z94.4 STATUS POST LIVER TRANSPLANTATION (H): ICD-10-CM

## 2023-09-07 DIAGNOSIS — C22.0 HCC (HEPATOCELLULAR CARCINOMA) (H): ICD-10-CM

## 2023-09-07 DIAGNOSIS — D84.9 IMMUNOSUPPRESSED STATUS (H): ICD-10-CM

## 2023-09-07 LAB
AFP SERPL-MCNC: 1.8 NG/ML
ALBUMIN SERPL BCG-MCNC: 4.9 G/DL (ref 3.5–5.2)
ALP SERPL-CCNC: 86 U/L (ref 40–129)
ALT SERPL W P-5'-P-CCNC: 19 U/L (ref 0–70)
ANION GAP SERPL CALCULATED.3IONS-SCNC: 10 MMOL/L (ref 7–15)
AST SERPL W P-5'-P-CCNC: 20 U/L (ref 0–45)
BASOPHILS # BLD AUTO: 0 10E3/UL (ref 0–0.2)
BASOPHILS NFR BLD AUTO: 1 %
BILIRUB SERPL-MCNC: 0.5 MG/DL
BUN SERPL-MCNC: 33.5 MG/DL (ref 8–23)
CALCIUM SERPL-MCNC: 10 MG/DL (ref 8.6–10)
CHLORIDE SERPL-SCNC: 103 MMOL/L (ref 98–107)
CREAT SERPL-MCNC: 1.5 MG/DL (ref 0.67–1.17)
CREAT UR-MCNC: 116 MG/DL
DEPRECATED HCO3 PLAS-SCNC: 25 MMOL/L (ref 22–29)
EGFRCR SERPLBLD CKD-EPI 2021: 53 ML/MIN/1.73M2
EOSINOPHIL # BLD AUTO: 0.1 10E3/UL (ref 0–0.7)
EOSINOPHIL NFR BLD AUTO: 2 %
ERYTHROCYTE [DISTWIDTH] IN BLOOD BY AUTOMATED COUNT: 15.4 % (ref 10–15)
GLUCOSE SERPL-MCNC: 101 MG/DL (ref 70–99)
HCT VFR BLD AUTO: 35.8 % (ref 40–53)
HGB BLD-MCNC: 11.5 G/DL (ref 13.3–17.7)
IMM GRANULOCYTES # BLD: 0 10E3/UL
IMM GRANULOCYTES NFR BLD: 1 %
LYMPHOCYTES # BLD AUTO: 0.8 10E3/UL (ref 0.8–5.3)
LYMPHOCYTES NFR BLD AUTO: 17 %
MAGNESIUM SERPL-MCNC: 2 MG/DL (ref 1.7–2.3)
MCH RBC QN AUTO: 31.3 PG (ref 26.5–33)
MCHC RBC AUTO-ENTMCNC: 32.1 G/DL (ref 31.5–36.5)
MCV RBC AUTO: 98 FL (ref 78–100)
MICROALBUMIN UR-MCNC: 924 MG/L
MICROALBUMIN/CREAT UR: 796.55 MG/G CR (ref 0–17)
MONOCYTES # BLD AUTO: 0.5 10E3/UL (ref 0–1.3)
MONOCYTES NFR BLD AUTO: 9 %
NEUTROPHILS # BLD AUTO: 3.5 10E3/UL (ref 1.6–8.3)
NEUTROPHILS NFR BLD AUTO: 70 %
NRBC # BLD AUTO: 0 10E3/UL
NRBC BLD AUTO-RTO: 0 /100
PHOSPHATE SERPL-MCNC: 2.7 MG/DL (ref 2.5–4.5)
PLATELET # BLD AUTO: 220 10E3/UL (ref 150–450)
POTASSIUM SERPL-SCNC: 4.5 MMOL/L (ref 3.4–5.3)
PROT SERPL-MCNC: 7.5 G/DL (ref 6.4–8.3)
RBC # BLD AUTO: 3.67 10E6/UL (ref 4.4–5.9)
SODIUM SERPL-SCNC: 138 MMOL/L (ref 136–145)
WBC # BLD AUTO: 4.9 10E3/UL (ref 4–11)

## 2023-09-07 PROCEDURE — 82570 ASSAY OF URINE CREATININE: CPT | Performed by: INTERNAL MEDICINE

## 2023-09-07 PROCEDURE — 82105 ALPHA-FETOPROTEIN SERUM: CPT | Performed by: INTERNAL MEDICINE

## 2023-09-07 PROCEDURE — 36415 COLL VENOUS BLD VENIPUNCTURE: CPT | Performed by: PATHOLOGY

## 2023-09-07 PROCEDURE — 80053 COMPREHEN METABOLIC PANEL: CPT | Performed by: PATHOLOGY

## 2023-09-07 PROCEDURE — 99000 SPECIMEN HANDLING OFFICE-LAB: CPT | Performed by: PATHOLOGY

## 2023-09-07 PROCEDURE — 85025 COMPLETE CBC W/AUTO DIFF WBC: CPT | Performed by: PATHOLOGY

## 2023-09-07 PROCEDURE — 83735 ASSAY OF MAGNESIUM: CPT | Performed by: PATHOLOGY

## 2023-09-07 PROCEDURE — 84100 ASSAY OF PHOSPHORUS: CPT | Performed by: PATHOLOGY

## 2023-09-07 RX ORDER — LAMIVUDINE 100 MG/1
100 TABLET, FILM COATED ORAL DAILY
Qty: 90 TABLET | Refills: 3 | Status: SHIPPED | OUTPATIENT
Start: 2023-09-07 | End: 2024-08-06

## 2023-09-07 RX ORDER — METOPROLOL SUCCINATE 25 MG/1
12.5 TABLET, EXTENDED RELEASE ORAL DAILY
Qty: 45 TABLET | Refills: 1 | Status: CANCELLED | OUTPATIENT
Start: 2023-09-07

## 2023-09-07 RX ORDER — PREDNISONE 5 MG/1
5 TABLET ORAL DAILY
Qty: 90 TABLET | Refills: 3 | Status: SHIPPED | OUTPATIENT
Start: 2023-09-07 | End: 2023-12-08

## 2023-09-07 ASSESSMENT — ENCOUNTER SYMPTOMS
CHILLS: 0
STIFFNESS: 1
EYE IRRITATION: 1
HALLUCINATIONS: 0
POLYPHAGIA: 0
MUSCLE CRAMPS: 1
POOR WOUND HEALING: 1
MYALGIAS: 1
EYE PAIN: 1
BACK PAIN: 1
WEIGHT LOSS: 1
EYE REDNESS: 0
FATIGUE: 1
SKIN CHANGES: 0
DECREASED APPETITE: 0
MUSCLE WEAKNESS: 1
INCREASED ENERGY: 1
JOINT SWELLING: 0
ALTERED TEMPERATURE REGULATION: 0
ARTHRALGIAS: 1
FEVER: 0
EYE WATERING: 0
WEIGHT GAIN: 0
DOUBLE VISION: 0
NIGHT SWEATS: 0
NECK PAIN: 1
NAIL CHANGES: 1
POLYDIPSIA: 0

## 2023-09-07 NOTE — TELEPHONE ENCOUNTER
Pt to increase epivir to 100 mg daily. Pt wanted to wait until he is back in town and see repeat labs.    Reviewed and pt repeated plan.

## 2023-09-11 ENCOUNTER — ALLIED HEALTH/NURSE VISIT (OUTPATIENT)
Dept: OTHER | Facility: CLINIC | Age: 58
End: 2023-09-11
Payer: MEDICARE

## 2023-09-11 ENCOUNTER — TELEPHONE (OUTPATIENT)
Dept: ENDOCRINOLOGY | Facility: CLINIC | Age: 58
End: 2023-09-11

## 2023-09-11 VITALS
OXYGEN SATURATION: 99 % | DIASTOLIC BLOOD PRESSURE: 78 MMHG | HEART RATE: 90 BPM | TEMPERATURE: 97.5 F | SYSTOLIC BLOOD PRESSURE: 150 MMHG

## 2023-09-11 DIAGNOSIS — I10 BENIGN ESSENTIAL HYPERTENSION: Primary | ICD-10-CM

## 2023-09-11 PROCEDURE — 99207 PR COMMUNITY PARAMEDIC - PATIENT NOT BILLABLE: CPT

## 2023-09-11 ASSESSMENT — ACTIVITIES OF DAILY LIVING (ADL): DEPENDENT_IADLS:: INDEPENDENT

## 2023-09-11 NOTE — PROGRESS NOTES
Community Paramedics Initial Visit  September 11, 2023 TIME:1200    Frandy Workman is a 58 year old male being seen at home for a Community Paramedic Home visit.    Present at appointment: Patient         Chief Complaint   Patient presents with    Outreach     Community Paramedic visit       [place] Utilization:   Clinic Utilization  Difficulty keeping appointments:: No  Compliance Concerns: No  No-Show Concerns: No  No PCP office visit in Past Year: No  Utilization      Hospital Admissions  2             ED Visits  1             No Show Count (past year)  0                    Current as of: 9/11/2023 10:55 AM                Clinical Concerns:  Current Medical Concerns:  HTN, Diabetes   Current Behavioral Concerns: no    Education Provided to patient: no     Vitals: BP (!) 150/78   Pulse 90   Temp 97.5  F (36.4  C)   SpO2 99%   BMI: There is no height or weight on file to calculate BMI.         Clinical Pathway: None    Review of Symptoms/PE    Skin: negative  Eyes: negative  Ears/Nose/Throat: negative  Respiratory: No shortness of breath, dyspnea on exertion, cough, or hemoptysis  Cardiovascular: negative  Gastrointestinal: negative  Genitourinary: negative  Musculoskeletal: negative  Neurologic: negative  Psychiatric: negative    Medication Management:    Medications need to be refilled:yes, Zofran     Medications set up: Yes  Pill Box issued: No  Scale issued: No  Flu Shot given: No  COVID Vaccine Given: No  Lab draw or specimen collection: No  Food resource given: No  Collaborative visit with PCP: No  Wound Care: No  Health Maintenance Addressed No    Functional Status:  Dependent ADLs:: Independent  Dependent IADLs:: Independent  Bed or wheelchair confined:: No  Mobility Status: Independent  Fallen 2 or more times in the past year?: No  Any fall with injury in the past year?: No    Living Situation:  Current living arrangement:: I live alone  [facility] residence[facility]  Paramedics Home Safety Assessment  Floors:  Are there throw rugs on the floor?: No  Are there papers, books, towels, shoes, magazines on the floor?: No  Are there loose wires and cords you need to walk around? : No  Stairs and Steps  Are there papers, books, towels, shoes, magazines on the stairs?: No  Are some steps broken or uneven?: No  Is there a light over the stairway?: Yes  Has the stairway bulb burned out?: No  Is the carpet on the steps loose or torn?: No  Are the handrails loose or broken?: No  Kitchen:  Are the things you use often on high shelves?: No  Is your step stool unsteady?: No  Bathrooms:  Is the tub or shower floor slippery?: No  Do you need support when you get in and out of the tub?: No  Bedrooms:  Is the light near the bed hard to reach?: No  Is the path from your bed to the bathroom dark?: No  Comments: : The home is clean and well kept.    Diet/Exercise/Sleep:  Inadequate nutrition (GOAL):: No  Tube Feeding: No  Inadequate activity/exercise (GOAL):: No  Significant changes in sleep pattern (GOAL): No    Transportation:     Transportation means:: Friend     Psychosocial:  Anglican or spiritual beliefs that impact treatment:: No  Mental health DX:: No  Mental health management concern (GOAL):: No  Informal Support system:: Family    Core Healthy Days Survey - Healthy Days Survey - (Centers for Disease Control and Prevention - Used with Permission.)  Would you say that in general your health is: : Fair  Now thinking about your physical health, which includes physical illness and injury, for how many days during the past 30 days was your physical health not good?: 15  Now thinking about your mental health, which includes stress, depression, and problems with emotions, for how many days during the past 30 days was your mental health not good?: 5  During the past 30 days, for about how many days did poor physical or mental health keep you from doing your usual activities, such as self-care,  work, or recreation?: 18  CHDS SCORE: 20    No  Face to Face in Home / Community    Today's PHQ-2 Score:      Today's PHQ-9 Score:       8/3/2023     9:13 AM   PHQ-9 SCORE   PHQ-9 Total Score MyChart 3 (Minimal depression)   PHQ-9 Total Score 3        Today's GAD7 score:       5/17/2023     3:37 PM   LIZZETTE-7 SCORE   Total Score 6               Resources and Interventions:  Current Resources:      Community Resources: None  Supplies Currently Used at Home: Diabetic Supplies, Nutritional Supplements  Equipment Currently Used at Home: cane, straight         Referrals Placed: Non     Care Plan:      Patient Active Problem List   Diagnosis    Bipolar affective disorder in remission (H)    Esophageal varices determined by endoscopy (H)    Erectile dysfunction due to diseases classified elsewhere    HCC (hepatocellular carcinoma) (H)    Equivocal stress echocardiogram    Type 2 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, unspecified whether long term insulin use (H)    Status post coronary angiogram    CAD (coronary artery disease)    Liver transplant recipient (H)    Status post liver transplantation (H)    Immunosuppressed status (H)    Benign essential hypertension    Chronic kidney disease, stage 3a (H)    Anemia of chronic renal failure, stage 3a (H)    History of coronary artery disease    Dyslipidemia    Excessive sweating    Stage 3b chronic kidney disease (H)    Anemia of chronic renal failure, stage 3b (H)    NSTEMI (non-ST elevated myocardial infarction) (H)    Chest pain, unspecified type    Hypertensive chronic kidney disease with stage 5 chronic kidney disease or end stage renal disease (H)    Vitreous hemorrhage of left eye (H)    Proliferative diabetic retinopathy of both eyes associated with type 2 diabetes mellitus, unspecified proliferative retinopathy type (H)    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  Also add 1 unit : 50 mg/dl >180 before meals and at bedtime., Disp: 45 mL, Rfl: 3    insulin degludec (TRESIBA FLEXTOUCH) 100 UNIT/ML pen, Inject 34 units subcutaneous once daily, Disp: 45 mL, Rfl: 3    lamiVUDine (EPIVIR) 100 MG tablet, Take 1 tablet (100 mg) by mouth daily, Disp: 90 tablet, Rfl: 3    metoprolol succinate ER (TOPROL XL) 25 MG 24 hr tablet, Take 0.5 tablets (12.5 mg) by mouth daily, Disp: 45 tablet, Rfl: 1    Multiple Vitamin (TAB-A-GLADIS) TABS, TAKE ONE TABLET BY MOUTH ONCE DAILY, Disp: 90 tablet, Rfl: 0    mycophenolate (GENERIC EQUIVALENT) 250 MG capsule, Take 2 capsules (500 mg) by mouth every 12 hours, Disp: 120 capsule, Rfl: 11    ondansetron (ZOFRAN ODT) 8 MG ODT tab, Take 1 tablet (8 mg) by mouth every 8 hours as needed for nausea, Disp: 15 tablet, Rfl: 1    pantoprazole (PROTONIX) 40 MG EC tablet, Take 1 tablet (40 mg) by mouth daily before breakfast, Disp: 90 tablet, Rfl: 3    predniSONE (DELTASONE) 5 MG tablet, Take 1 tablet (5 mg) by mouth daily, Disp: 90 tablet, Rfl: 3    rosuvastatin (CRESTOR) 20 MG tablet, Take 1 tablet (20 mg) by mouth daily, Disp: 90 tablet, Rfl: 3    tamsulosin (FLOMAX) 0.4 MG capsule, Take 1 capsule (0.4 mg) by mouth daily, Disp: 90 capsule, Rfl: 3    Vitamin D3 50 mcg (2000 units) tablet, Take 1 tablet (50 mcg) by mouth daily, Disp: 90 tablet, Rfl: 3    Alcohol Swabs (ALCOHOL PREP) 70 % PADS, Use for glucose testing 4x daily  and insulin  administration 4x daily., Disp: 400 each, Rfl: 1    ammonium lactate (AMLACTIN) 12 % external cream, Apply topically daily as needed for dry skin (on feet), Disp: , Rfl:     BD VIKTORIA U/F 32G X 4 MM insulin pen needle, Use 5 per day, Disp: 300 each, Rfl: 3    benzoyl peroxide 5 % external liquid, Use topically in showers as a body wash, Disp: 226 g, Rfl: 11    blood glucose (NO BRAND SPECIFIED) lancets standard, Use to test blood sugar 1 times daily or as directed., Disp: 100 each, Rfl: 11    blood glucose (NO BRAND SPECIFIED) test strip, Use to test blood sugar 1 times daily or as directed., Disp: 100 strip, Rfl: 3    blood glucose monitoring (NO BRAND SPECIFIED) meter device kit, Use to test blood sugar 1 times daily or as directed., Disp: 1 kit, Rfl: 1    ketoconazole (NIZORAL) 2 % external shampoo, Apply thin layer topically to scalp in shower (leave on 5 min prior to rinse); may also use as a body wash, Disp: 120 mL, Rfl: 11    lamiVUDine (EPIVIR) 10 MG/ML solution, Take 5 mLs (50 mg) by mouth daily (Patient not taking: Reported on 9/11/2023), Disp: 240 mL, Rfl: 0    Current Facility-Administered Medications:     aflibercept (EYLEA) injection prefilled syringe 2 mg, 2 mg, Intravitreal, Q28 Days, Enriqueta Martin MD, 2 mg at 05/31/23 1144    aflibercept (EYLEA) injection prefilled syringe 2 mg, 2 mg, Intravitreal, Q28 Days, Juan Angeles MD, 2 mg at 08/02/23 1023    dexamethasone (OZURDEX) intravitreal implant 0.7 mg, 0.7 mg, Intravitreal, Q2 Months, Enriqueta Martin MD    dexamethasone (OZURDEX) intravitreal implant 0.7 mg, 0.7 mg, Intravitreal, Q2 Months, Enriqueta Martin MD faricimab-svoa (VABYSMO) intravitreal injection 6 mg, 6 mg, Intravitreal, q28 days, Enriqueta Martin MD    faricimab-svoa (VABYSMO) intravitreal injection 6 mg, 6 mg, Intravitreal, q28 days, Enriqueta Martin MD    lidocaine (PF) (XYLOCAINE) injection 1 mL, 1 mL, Ophthalmic, Q2 Months, [place],  Enriqueta Marks MD        Time spent with patient: 120    The patient meets one or more of the following criteria:  * Requires services to prevent readmission to a nursing home or hospital    Next CP visit scheduled: 9/18/23    Issues for Provider to follow up on:  I met with Miller in his home today to discuss possible needs from the Community Paramedic program.  He is not home bound and is a great self advocate but would like some help with setting up his medications.  We discussed getting them set up by a mail order pharmacy to which he is in agreement with.  We will discuss this further next week at our next appointment.    Provider follow up visit needed: Multiple upcoming

## 2023-09-11 NOTE — TELEPHONE ENCOUNTER
Spoke w/ Pt: Can't eat until 11:00AM. BS at 0910AM: 73. Took (1) 4mg glucagon tablet 09:12AM as instructed by writer   Has not taken insulin since 6:00pm last night.   Has glucagon on hand.   Confirms understanding of when to be seen in ER.  Provider notified.   Abby Cespedes RN on 9/11/2023 at 9:13 AM         [facility] Call Center    Phone Message    May a detailed message be left on voicemail: yes     Reason for Call: Symptoms or Concerns     If patient has red-flag symptoms, warm transfer to triage line    Current symptom or concern: Per Patient states he started to take Chemo again on 9/8/2023. Patient states he got woken up at 4:00am and his blood guars were at 61. Patient states he ate breakfast at 6:30am and his blood sugars are now at 82. Patient states he had checked his meter and manually checked and the reader is correct. Patient is wanting to know what he should do. Patient states he would like to get a call back and that he let his oncologist know. Please advise    Symptoms have been present for:  3 day(s)    Has patient previously been seen for this? No    By n/a    Date: n/a    Are there any new or worsening symptoms? Yes: blood sugar was 62 at 4:00am    Action Taken: Message routed to:  [facility] Endo    Travel Screening: Not Applicable

## 2023-09-12 ENCOUNTER — ONCOLOGY VISIT (OUTPATIENT)
Dept: ONCOLOGY | Facility: CLINIC | Age: 58
End: 2023-09-12
Attending: NURSE PRACTITIONER
Payer: MEDICARE

## 2023-09-12 VITALS
WEIGHT: 181.5 LBS | HEART RATE: 104 BPM | BODY MASS INDEX: 26.8 KG/M2 | SYSTOLIC BLOOD PRESSURE: 162 MMHG | DIASTOLIC BLOOD PRESSURE: 100 MMHG | TEMPERATURE: 98.4 F | RESPIRATION RATE: 20 BRPM | OXYGEN SATURATION: 98 %

## 2023-09-12 DIAGNOSIS — C78.6 MALIGNANT NEOPLASM METASTATIC TO PERITONEUM (H): ICD-10-CM

## 2023-09-12 DIAGNOSIS — C22.0 HCC (HEPATOCELLULAR CARCINOMA) (H): ICD-10-CM

## 2023-09-12 PROCEDURE — 99214 OFFICE O/P EST MOD 30 MIN: CPT | Performed by: NURSE PRACTITIONER

## 2023-09-12 PROCEDURE — G0463 HOSPITAL OUTPT CLINIC VISIT: HCPCS | Performed by: NURSE PRACTITIONER

## 2023-09-12 ASSESSMENT — PAIN SCALES - GENERAL: PAINLEVEL: SEVERE PAIN (6)

## 2023-09-12 NOTE — PROGRESS NOTES
Reason for Visit: seen in follow-up of recurrent HCC    Oncology HPI:   Miller Workman is a 58 year old man with a PMH of DMII, bipolar disorder, cirrhosis s/t ESTRADA with subsequent development of HCC, s/p liver transplant , HLD, HTN, GERD, BPH and CAD with hx of 2 vessel CABG in July 2021, CKD with anemia of chronic disease, on erythropoetin.      He as diagnosed with hepatocellular carcinoma in December 2018 when he was found to have 2 LIRADS 5 lesions on surveillance imaging. He underwent TACE on 1/22/19.  He is s/p  liver transplant on 11/11/2019. The explanted liver had 2 lesions that were mostly necrotic and less than 3 cm with no clearly identifiable vascular invasion.      He was found to have peritoneal masses on routine surveillance imaging in June 2023. He underwent laparoscopy with biopsies and pericecal mass resection confirming metastatic HCC.     He met with Dr. Villarreal in July with recommendation to start sorafenib. He started 400 mg BID on 7/29/23. He presents for a toxicity assessment today.     He was admitted 8/10-8/16 with hyperkalemia, acute kidney injury. He required one round of hemodialysis. He was noted to have thrombocytopenia with a platelet count from 178 t9 97. A HIT VAHID was negative.   Renal function and hyperkalemia improved through the course of hospitalization. He was followed by nephrology. He was drinking 1.6 L of fluid.  Jardiance and lisinopril held. The cause of the JERONIMO was in the context of severe nausea and vomiting and diarrhea prior to admission.        Interval history:   Miller is joined in the visit by 2 friends who are advocates for him. He resumed sorafenib on Friday, taking 200 mg.  By that evening, he was experiencing muscle cramping. He has started to use a lidocaine patch and the cramping has resolved.  He has continued the sorafenib at 200 mg daily.  -he is concerned about his blood pressure. Recently it has been more elevated. He is worried that sorafenib will make  that worse  -he is also experiencing periodic hypoglycemic episodes. He notes that his monitor alarmed in the middle of the night and his glucose was in the 40s. He called the EMTs and took some sugar tablets.  He is feeling fine today, but hoping to have some advice on how to prevent the lows. Since his hospitalization, he has been on insulin. He didn't take any last night before bed and still had a low.  -he notes his diet has been more restrictive so he isn't eating as much. Trying to follow a low carb and renal diet.  -no diarrhea, N/V. No skin rashes or mucosal irritation    Current Outpatient Medications   Medication Sig Dispense Refill    Alcohol Swabs (ALCOHOL PREP) 70 % PADS Use for glucose testing 4x daily  and insulin administration 4x daily. 400 each 1    ammonium lactate (AMLACTIN) 12 % external cream Apply topically daily as needed for dry skin (on feet)      aspirin (SM ASPIRIN ADULT LOW STRENGTH) 81 MG EC tablet Take 2 tablets (162 mg) by mouth daily 180 tablet 3    BD VIKTORIA U/F 32G X 4 MM insulin pen needle Use 5 per day 300 each 3    benzoyl peroxide 5 % external liquid Use topically in showers as a body wash 226 g 11    blood glucose (NO BRAND SPECIFIED) lancets standard Use to test blood sugar 1 times daily or as directed. 100 each 11    blood glucose (NO BRAND SPECIFIED) test strip Use to test blood sugar 1 times daily or as directed. 100 strip 3    blood glucose monitoring (NO BRAND SPECIFIED) meter device kit Use to test blood sugar 1 times daily or as directed. 1 kit 1    Continuous Blood Gluc Sensor (FREESTYLE CLAUDIA 2 SENSOR) MISC 1 each See Admin Instructions Change every 14 days. 7 each 3    insulin aspart (NOVOLOG PEN) 100 UNIT/ML pen Inject 5-10 Units Subcutaneous 4 times daily (with meals and nightly) 1unit : 8 g carb before meals.  Also add 1 unit : 50 mg/dl >180 before meals and at bedtime. 45 mL 3    insulin degludec (TRESIBA FLEXTOUCH) 100 UNIT/ML pen Inject 34 units subcutaneous  once daily 45 mL 3    ketoconazole (NIZORAL) 2 % external shampoo Apply thin layer topically to scalp in shower (leave on 5 min prior to rinse); may also use as a body wash 120 mL 11    lamiVUDine (EPIVIR) 10 MG/ML solution Take 5 mLs (50 mg) by mouth daily (Patient not taking: Reported on 9/11/2023) 240 mL 0    lamiVUDine (EPIVIR) 100 MG tablet Take 1 tablet (100 mg) by mouth daily 90 tablet 3    metoprolol succinate ER (TOPROL XL) 25 MG 24 hr tablet Take 0.5 tablets (12.5 mg) by mouth daily 45 tablet 1    Multiple Vitamin (TAB-A-GLADIS) TABS TAKE ONE TABLET BY MOUTH ONCE DAILY 90 tablet 0    mycophenolate (GENERIC EQUIVALENT) 250 MG capsule Take 2 capsules (500 mg) by mouth every 12 hours 120 capsule 11    ondansetron (ZOFRAN ODT) 8 MG ODT tab Take 1 tablet (8 mg) by mouth every 8 hours as needed for nausea 15 tablet 1    pantoprazole (PROTONIX) 40 MG EC tablet Take 1 tablet (40 mg) by mouth daily before breakfast 90 tablet 3    predniSONE (DELTASONE) 5 MG tablet Take 1 tablet (5 mg) by mouth daily 90 tablet 3    rosuvastatin (CRESTOR) 20 MG tablet Take 1 tablet (20 mg) by mouth daily 90 tablet 3    tamsulosin (FLOMAX) 0.4 MG capsule Take 1 capsule (0.4 mg) by mouth daily 90 capsule 3    Vitamin D3 50 mcg (2000 units) tablet Take 1 tablet (50 mcg) by mouth daily 90 tablet 3          Allergies   Allergen Reactions    Codeine Other (See Comments)     Cannot take due to liver  Cannot tolerate oral narcotics    Seasonal Allergies      Sneezing, coughing, runny and itchy eyes         Exam: alert, animated. In NAD.  Blood pressure (!) 162/100, pulse 104, temperature 98.4  F (36.9  C), temperature source Oral, resp. rate 20, weight 82.3 kg (181 lb 8 oz), SpO2 98 %.  Wt Readings from Last 4 Encounters:   08/29/23 83.2 kg (183 lb 6.4 oz)   08/25/23 83.3 kg (183 lb 9.6 oz)   08/15/23 83.1 kg (183 lb 4.8 oz)   08/10/23 82.6 kg (182 lb)         Labs:    Latest Reference Range & Units 09/07/23 10:14   Sodium 136 - 145 mmol/L 138    Potassium 3.4 - 5.3 mmol/L 4.5   Chloride 98 - 107 mmol/L 103   Carbon Dioxide (CO2) 22 - 29 mmol/L 25   Urea Nitrogen 8.0 - 23.0 mg/dL 33.5 (H)   Creatinine 0.67 - 1.17 mg/dL 1.50 (H)   GFR Estimate >60 mL/min/1.73m2 53 (L)   Calcium 8.6 - 10.0 mg/dL 10.0   Anion Gap 7 - 15 mmol/L 10   Magnesium 1.7 - 2.3 mg/dL 2.0   Phosphorus 2.5 - 4.5 mg/dL 2.7   Albumin 3.5 - 5.2 g/dL 4.9   Protein Total 6.4 - 8.3 g/dL 7.5   Alkaline Phosphatase 40 - 129 U/L 86   ALT 0 - 70 U/L 19   AST 0 - 45 U/L 20   AFP tumor marker <=8.3 ng/mL 1.8   Bilirubin Total <=1.2 mg/dL 0.5   Glucose 70 - 99 mg/dL 101 (H)   WBC 4.0 - 11.0 10e3/uL 4.9   Hemoglobin 13.3 - 17.7 g/dL 11.5 (L)   Hematocrit 40.0 - 53.0 % 35.8 (L)   Platelet Count 150 - 450 10e3/uL 220   RBC Count 4.40 - 5.90 10e6/uL 3.67 (L)   MCV 78 - 100 fL 98   MCH 26.5 - 33.0 pg 31.3   MCHC 31.5 - 36.5 g/dL 32.1   RDW 10.0 - 15.0 % 15.4 (H)   % Neutrophils % 70   % Lymphocytes % 17   % Monocytes % 9   % Eosinophils % 2   % Basophils % 1   Absolute Basophils 0.0 - 0.2 10e3/uL 0.0   Absolute Eosinophils 0.0 - 0.7 10e3/uL 0.1   Absolute Immature Granulocytes <=0.4 10e3/uL 0.0   Absolute Lymphocytes 0.8 - 5.3 10e3/uL 0.8   Absolute Monocytes 0.0 - 1.3 10e3/uL 0.5   % Immature Granulocytes % 1   Absolute Neutrophils 1.6 - 8.3 10e3/uL 3.5   Absolute NRBCs 10e3/uL 0.0   NRBCs per 100 WBC <1 /100 0   (H): Data is abnormally high  (L): Data is abnormally low    Imaging: n/a    Impression/plan:   Hepatocellular carcinoma, s/p prior TACE and liver transplantation in November 2019. 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   -started sorafenib 400 mg BID on 7/29.  Tolerated the first several days well, except for some diarrhea. Then developed acute N/V 4 days prior to admission for renal failure and hyperkalemia. T  -he had an extended break off of therapy as he was traveling over the labor day weekend. He resumed 200 mg daily on 9/8. He is having various concerns including  hypoglycemia, hypertension and cramping today. Elevated BP is a side effect of sorafenib, but I suspect the discontinuation of the lisinopril is likely contributing. His hypoglycemia may be s/t sorafenib, but also think it is more likely related to the strict diet he has been following,  -I will message the renal and endocrine teams about these issues. He has follow-up with Dr. Rao later this week and will review whether he is safe to resume lisinopril. If not, I reviewed that there are other options for management.  -Ideally, we would like to increase the sorafenib up to a more effective dose and will hope to do that when he sees Dr. Villarreal next week with another CT and labs.  As we escalate, we will need to monitor his Cr closely    Thrombocytopenia, likely s/t sorafenib, no evidence of HIT on VAHID. Improved on recent labs    Acute on chronic kidney disease  -recent admission with JERONIMO, hyperkalemia, s/p 1 run of hemodialysis. Improving kidney function, nearing baseline  -follow with Dr. Rao on 9/15    Hx of liver transplant  -immunosuppression with MMF, prednisone as per Dr. Banuelos    CAD s/p 2 vessel CABG 2021  -remains on rosuvastatin, metoprlol. Holding lisinopril with recent renal failure. BP elevated, will follow-up with Dr. Rao on Friday    Bipolar disorder. Following with Dr. Murillo.

## 2023-09-12 NOTE — NURSING NOTE
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    Weight as of this encounter: 82.3 kg (181 lb 8 oz). Body surface area is 2 meters squared.  Severe Pain (6) Comment: Data Unavailable   No LMP for male patient.  Allergies reviewed: Yes  Medications reviewed: Yes    Medications: Medication refills not needed today.  Pharmacy name entered into [employer]:    [facility] - [place], [place] - [address] [place] [address] PHARMACY [facility] - [place], [place] - [address] [facility] [phone]    Clinical concerns: Pt would like to discuss changes to one medication because it affects his BP. His BP has been high recently. Pt also experiences cramps in his calves - he has used biofreeze, which has helped.       Alexandra Quick"

## 2023-09-12 NOTE — LETTER
9/12/2023         RE: Frandy Workman  [address] [address]        Dear Colleague,    Thank you for referring your patient, Frandy Workman, to the [facility]. Please see a copy of my visit note below.    Reason for Visit: seen in follow-up of recurrent HCC    Oncology HPI:   Miller Workman is a 58 year old man with a PMH of DMII, bipolar disorder, cirrhosis s/t ESTRADA with subsequent development of HCC, s/p liver transplant , HLD, HTN, GERD, BPH and CAD with hx of 2 vessel CABG in July 2021, CKD with anemia of chronic disease, on erythropoetin.      He as diagnosed with hepatocellular carcinoma in December 2018 when he was found to have 2 LIRADS 5 lesions on surveillance imaging. He underwent TACE on 1/22/19.  He is s/p  liver transplant on 11/11/2019. The explanted liver had 2 lesions that were mostly necrotic and less than 3 cm with no clearly identifiable vascular invasion.      He was found to have peritoneal masses on routine surveillance imaging in June 2023. He underwent laparoscopy with biopsies and pericecal mass resection confirming metastatic HCC.     He met with Dr. Villarreal in July with recommendation to start sorafenib. He started 400 mg BID on 7/29/23. He presents for a toxicity assessment today.     He was admitted 8/10-8/16 with hyperkalemia, acute kidney injury. He required one round of hemodialysis. He was noted to have thrombocytopenia with a platelet count from 178 t9 97. A HIT VAHID was negative.   Renal function and hyperkalemia improved through the course of hospitalization. He was followed by nephrology. He was drinking 1.6 L of fluid.  Jardiance and lisinopril held. The cause of the JERONIMO was in the context of severe nausea and vomiting and diarrhea prior to admission.        Interval history:   Miller is joined in the visit by 2 friends who are advocates for him. He resumed sorafenib on Friday, taking 200 mg.  By that evening, he was experiencing  muscle cramping. He has started to use a lidocaine patch and the cramping has resolved.  He has continued the sorafenib at 200 mg daily.  -he is concerned about his blood pressure. Recently it has been more elevated. He is worried that sorafenib will make that worse  -he is also experiencing periodic hypoglycemic episodes. He notes that his monitor alarmed in the middle of the night and his glucose was in the 40s. He called the EMTs and took some sugar tablets.  He is feeling fine today, but hoping to have some advice on how to prevent the lows. Since his hospitalization, he has been on insulin. He didn't take any last night before bed and still had a low.  -he notes his diet has been more restrictive so he isn't eating as much. Trying to follow a low carb and renal diet.  -no diarrhea, N/V. No skin rashes or mucosal irritation    Current Outpatient Medications   Medication Sig Dispense Refill    Alcohol Swabs (ALCOHOL PREP) 70 % PADS Use for glucose testing 4x daily  and insulin administration 4x daily. 400 each 1    ammonium lactate (AMLACTIN) 12 % external cream Apply topically daily as needed for dry skin (on feet)      aspirin (SM ASPIRIN ADULT LOW STRENGTH) 81 MG EC tablet Take 2 tablets (162 mg) by mouth daily 180 tablet 3    BD VIKTORIA U/F 32G X 4 MM insulin pen needle Use 5 per day 300 each 3    benzoyl peroxide 5 % external liquid Use topically in showers as a body wash 226 g 11    blood glucose (NO BRAND SPECIFIED) lancets standard Use to test blood sugar 1 times daily or as directed. 100 each 11    blood glucose (NO BRAND SPECIFIED) test strip Use to test blood sugar 1 times daily or as directed. 100 strip 3    blood glucose monitoring (NO BRAND SPECIFIED) meter device kit Use to test blood sugar 1 times daily or as directed. 1 kit 1    Continuous Blood Gluc Sensor (FREESTYLE CLAUDIA 2 SENSOR) MISC 1 each See Admin Instructions Change every 14 days. 7 each 3    insulin aspart (NOVOLOG PEN) 100 UNIT/ML pen  Inject 5-10 Units Subcutaneous 4 times daily (with meals and nightly) 1unit : 8 g carb before meals.  Also add 1 unit : 50 mg/dl >180 before meals and at bedtime. 45 mL 3    insulin degludec (TRESIBA FLEXTOUCH) 100 UNIT/ML pen Inject 34 units subcutaneous once daily 45 mL 3    ketoconazole (NIZORAL) 2 % external shampoo Apply thin layer topically to scalp in shower (leave on 5 min prior to rinse); may also use as a body wash 120 mL 11    lamiVUDine (EPIVIR) 10 MG/ML solution Take 5 mLs (50 mg) by mouth daily (Patient not taking: Reported on 9/11/2023) 240 mL 0    lamiVUDine (EPIVIR) 100 MG tablet Take 1 tablet (100 mg) by mouth daily 90 tablet 3    metoprolol succinate ER (TOPROL XL) 25 MG 24 hr tablet Take 0.5 tablets (12.5 mg) by mouth daily 45 tablet 1    Multiple Vitamin (TAB-A-GLADIS) TABS TAKE ONE TABLET BY MOUTH ONCE DAILY 90 tablet 0    mycophenolate (GENERIC EQUIVALENT) 250 MG capsule Take 2 capsules (500 mg) by mouth every 12 hours 120 capsule 11    ondansetron (ZOFRAN ODT) 8 MG ODT tab Take 1 tablet (8 mg) by mouth every 8 hours as needed for nausea 15 tablet 1    pantoprazole (PROTONIX) 40 MG EC tablet Take 1 tablet (40 mg) by mouth daily before breakfast 90 tablet 3    predniSONE (DELTASONE) 5 MG tablet Take 1 tablet (5 mg) by mouth daily 90 tablet 3    rosuvastatin (CRESTOR) 20 MG tablet Take 1 tablet (20 mg) by mouth daily 90 tablet 3    tamsulosin (FLOMAX) 0.4 MG capsule Take 1 capsule (0.4 mg) by mouth daily 90 capsule 3    Vitamin D3 50 mcg (2000 units) tablet Take 1 tablet (50 mcg) by mouth daily 90 tablet 3          Allergies   Allergen Reactions    Codeine Other (See Comments)     Cannot take due to liver  Cannot tolerate oral narcotics    Seasonal Allergies      Sneezing, coughing, runny and itchy eyes         Exam: alert, animated. In NAD.  Blood pressure (!) 162/100, pulse 104, temperature 98.4  F (36.9  C), temperature source Oral, resp. rate 20, weight 82.3 kg (181 lb 8 oz), SpO2 98 %.  Wt  Readings from Last 4 Encounters:   08/29/23 83.2 kg (183 lb 6.4 oz)   08/25/23 83.3 kg (183 lb 9.6 oz)   08/15/23 83.1 kg (183 lb 4.8 oz)   08/10/23 82.6 kg (182 lb)         Labs:    Latest Reference Range & Units 09/07/23 10:14   Sodium 136 - 145 mmol/L 138   Potassium 3.4 - 5.3 mmol/L 4.5   Chloride 98 - 107 mmol/L 103   Carbon Dioxide (CO2) 22 - 29 mmol/L 25   Urea Nitrogen 8.0 - 23.0 mg/dL 33.5 (H)   Creatinine 0.67 - 1.17 mg/dL 1.50 (H)   GFR Estimate >60 mL/min/1.73m2 53 (L)   Calcium 8.6 - 10.0 mg/dL 10.0   Anion Gap 7 - 15 mmol/L 10   Magnesium 1.7 - 2.3 mg/dL 2.0   Phosphorus 2.5 - 4.5 mg/dL 2.7   Albumin 3.5 - 5.2 g/dL 4.9   Protein Total 6.4 - 8.3 g/dL 7.5   Alkaline Phosphatase 40 - 129 U/L 86   ALT 0 - 70 U/L 19   AST 0 - 45 U/L 20   AFP tumor marker <=8.3 ng/mL 1.8   Bilirubin Total <=1.2 mg/dL 0.5   Glucose 70 - 99 mg/dL 101 (H)   WBC 4.0 - 11.0 10e3/uL 4.9   Hemoglobin 13.3 - 17.7 g/dL 11.5 (L)   Hematocrit 40.0 - 53.0 % 35.8 (L)   Platelet Count 150 - 450 10e3/uL 220   RBC Count 4.40 - 5.90 10e6/uL 3.67 (L)   MCV 78 - 100 fL 98   MCH 26.5 - 33.0 pg 31.3   MCHC 31.5 - 36.5 g/dL 32.1   RDW 10.0 - 15.0 % 15.4 (H)   % Neutrophils % 70   % Lymphocytes % 17   % Monocytes % 9   % Eosinophils % 2   % Basophils % 1   Absolute Basophils 0.0 - 0.2 10e3/uL 0.0   Absolute Eosinophils 0.0 - 0.7 10e3/uL 0.1   Absolute Immature Granulocytes <=0.4 10e3/uL 0.0   Absolute Lymphocytes 0.8 - 5.3 10e3/uL 0.8   Absolute Monocytes 0.0 - 1.3 10e3/uL 0.5   % Immature Granulocytes % 1   Absolute Neutrophils 1.6 - 8.3 10e3/uL 3.5   Absolute NRBCs 10e3/uL 0.0   NRBCs per 100 WBC <1 /100 0   (H): Data is abnormally high  (L): Data is abnormally low    Imaging: n/a    Impression/plan:   Hepatocellular carcinoma, s/p prior TACE and liver transplantation in November 2019. 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   -started sorafenib 400 mg BID on 7/29.  Tolerated the first several days well, except for some diarrhea.  Then developed acute N/V 4 days prior to admission for renal failure and hyperkalemia. T  -he had an extended break off of therapy as he was traveling over the labor day weekend. He resumed 200 mg daily on 9/8. He is having various concerns including hypoglycemia, hypertension and cramping today. Elevated BP is a side effect of sorafenib, but I suspect the discontinuation of the lisinopril is likely contributing. His hypoglycemia may be s/t sorafenib, but also think it is more likely related to the strict diet he has been following,  -I will message the renal and endocrine teams about these issues. He has follow-up with Dr. Rao later this week and will review whether he is safe to resume lisinopril. If not, I reviewed that there are other options for management.  -Ideally, we would like to increase the sorafenib up to a more effective dose and will hope to do that when he sees Dr. Villarreal next week with another CT and labs.  As we escalate, we will need to monitor his Cr closely    Thrombocytopenia, likely s/t sorafenib, no evidence of HIT on VAHID. Improved on recent labs    Acute on chronic kidney disease  -recent admission with JERONIMO, hyperkalemia, s/p 1 run of hemodialysis. Improving kidney function, nearing baseline  -follow with Dr. Rao on 9/15    Hx of liver transplant  -immunosuppression with MMF, prednisone as per Dr. Banuelos    CAD s/p 2 vessel CABG 2021  -remains on rosuvastatin, metoprlol. Holding lisinopril with recent renal failure. BP elevated, will follow-up with Dr. Rao on Friday    Bipolar disorder. Following with Dr. Murillo.    SHANIQUE Liz CNP

## 2023-09-13 ENCOUNTER — DOCUMENTATION ONLY (OUTPATIENT)
Dept: MULTI SPECIALTY CLINIC | Facility: CLINIC | Age: 58
End: 2023-09-13

## 2023-09-13 ENCOUNTER — TELEPHONE (OUTPATIENT)
Dept: NEPHROLOGY | Facility: CLINIC | Age: 58
End: 2023-09-13

## 2023-09-13 ENCOUNTER — OFFICE VISIT (OUTPATIENT)
Dept: OPHTHALMOLOGY | Facility: CLINIC | Age: 58
End: 2023-09-13
Attending: OPHTHALMOLOGY
Payer: MEDICARE

## 2023-09-13 DIAGNOSIS — E11.3593 PROLIFERATIVE DIABETIC RETINOPATHY OF BOTH EYES ASSOCIATED WITH TYPE 2 DIABETES MELLITUS, UNSPECIFIED PROLIFERATIVE RETINOPATHY TYPE (H): Primary | ICD-10-CM

## 2023-09-13 DIAGNOSIS — E11.3313 TYPE 2 DIABETES MELLITUS WITH MODERATE NONPROLIFERATIVE RETINOPATHY OF BOTH EYES AND MACULAR EDEMA, UNSPECIFIED WHETHER LONG TERM INSULIN USE (H): ICD-10-CM

## 2023-09-13 PROCEDURE — 99214 OFFICE O/P EST MOD 30 MIN: CPT | Performed by: OPHTHALMOLOGY

## 2023-09-13 PROCEDURE — 92134 CPTRZ OPH DX IMG PST SGM RTA: CPT | Performed by: OPHTHALMOLOGY

## 2023-09-13 PROCEDURE — G0463 HOSPITAL OUTPT CLINIC VISIT: HCPCS | Mod: 25 | Performed by: OPHTHALMOLOGY

## 2023-09-13 PROCEDURE — 92235 FLUORESCEIN ANGRPH MLTIFRAME: CPT | Performed by: OPHTHALMOLOGY

## 2023-09-13 ASSESSMENT — CONF VISUAL FIELD
OD_INFERIOR_NASAL_RESTRICTION: 0
OS_NORMAL: 1
OS_INFERIOR_TEMPORAL_RESTRICTION: 0
OD_SUPERIOR_NASAL_RESTRICTION: 0
OS_SUPERIOR_TEMPORAL_RESTRICTION: 0
OD_SUPERIOR_TEMPORAL_RESTRICTION: 0
METHOD: COUNTING FINGERS
OD_INFERIOR_TEMPORAL_RESTRICTION: 0
OS_SUPERIOR_NASAL_RESTRICTION: 0
OS_INFERIOR_NASAL_RESTRICTION: 0
OD_NORMAL: 1

## 2023-09-13 ASSESSMENT — VISUAL ACUITY
OD_CC+: -1
OS_CC+: -3
METHOD: SNELLEN - LINEAR
OD_CC: 20/25
OS_CC: 20/25

## 2023-09-13 ASSESSMENT — SLIT LAMP EXAM - LIDS
COMMENTS: NORMAL
COMMENTS: SMALL PAPILLOMA ALONG LL MARGIN, NON CONCERNING

## 2023-09-13 ASSESSMENT — TONOMETRY
OD_IOP_MMHG: 17
IOP_METHOD: ICARE
OS_IOP_MMHG: 15

## 2023-09-13 ASSESSMENT — EXTERNAL EXAM - LEFT EYE: OS_EXAM: PERIOCULAR ECCHYMOSIS

## 2023-09-13 ASSESSMENT — CUP TO DISC RATIO
OS_RATIO: 0.4
OD_RATIO: 0.3

## 2023-09-13 ASSESSMENT — EXTERNAL EXAM - RIGHT EYE: OD_EXAM: NORMAL

## 2023-09-13 NOTE — NURSING NOTE
Chief Complaints and History of Present Illnesses   Patient presents with    Follow Up     Chief Complaint(s) and History of Present Illness(es)       Follow Up              Laterality: both eyes    Course: gradually worsening    Associated symptoms: flashes.  Negative for eye pain and floaters    Treatments tried: eye drops              Comments    Here for follow up. VA has decreased. Occasional flashes at night. No floaters. No pain.    Lab Results       Component                Value               Date                       A1C                      6.3                 06/14/2023                 A1C                      6.9                 12/14/2022                 A1C                      6.0                 01/10/2022                 A1C                      6.8                 07/13/2021                 A1C                      6.0                 12/30/2020                 A1C                      6.3                 06/13/2020                 A1C                      6.6                 08/08/2018                 A1C                      6.5                 06/09/2017                 A1C                      7.8                 10/25/2016              Reji Gomez COT 9:30 AM September 13, 2023

## 2023-09-13 NOTE — PROGRESS NOTES
CC:  Follow up Diabetic retinopathy     Interval: started chemo this week for GI Ca. Was in the ICU because of  kidney failure- emergent dialysis   Here for 4 week follow-up of Type 2 diabetes mellitus with PDR both eyes and macular edema.     He has had history of sinus issues worsening this year with allergy season. VA subjectively unchanged, no flashes, no floaters. Last visit he received a left eye injection only.  He is working on walking more which is helping with weight loss.     Lab Results     A1C                      6.3                 6/14/2023       HPI: Frandy Workman is a 58 year old patient status post Cataract extraction intraocular lens left eye   history of Diabetes mellitus for 24 ys . Patient on insulin.    Interval History: s/p CEIOL left eye 1/3/22    Retinal Imaging:  OCT 09/13/23   RE: decreased of central DME compared to last visit.    LE: No IRF/SRF, stable.    fluorescein angiography 09/13/23  both eyes normal AV and choroidal filling ou. Staining laser scars. No obvious NVE, mild late macula leakage. peripheral leakage and capillary non perfusion.     Optos consistent with clinical exam    ASSESSMENT:     #T2DM   - HbA1c 6.3% (6/2023)  - Blood pressure (<120/80) and blood glucose (HbA1c <7.0, ~6.5 today) control discussed with patient. Patient advised that failure to adequately control each may lead to vision loss. The patient expressed understanding.    # Proliferative diabetic retinopathy left eye; pre-proliferative diabetic retinopathy right eye    # vitreous hemorrhage left eye 10/12/22   Status post Eylea inj left eye   Status post Panretinal laser photocoagulation (PRP) 9.28.22 left eye and Status post scatter PRP right eye 11/02/22     # Diabetic macular edema both eyes   - status post avastin in both eyes; Switch to Eylea 4/24/19 with improvement of Diabetic macular edema     - Tried holding off on injection 3/29/23, but edema worsened both eyes  - last Eylea right eye; 8/02/23  interval improvement 09/13/23   -  discussed with to T&E vs closed observation. He prefers to observe today  - could consider Switch to Vabysmo vs ozurdex in the future    # status post Cataract extraction intraocular lens both eyes   Right eye: 01/24/23; left eye 01/3/23  -IOP WNL today  Retina detachment and endophthalmitis precautions were discussed with the patient (increased blurry vision, drainage, new flashes, floaters or a curtain in the visual field) and was asked to return if any of the those occur    # Dry eye syndrome   Left eye worse than right eye   warm compresses and artificial tears  As needed  - punctal plugs previously left eye - reports this is better     # Status post liver transplant  - tx 11/2019  - severe anemia; s/p transfusion - anemia much improved   - being seen by his primary team      PLAN:  - plan to observe closely - Diabetic macular edema currently resolved. - could treat as needed  - Follow up in 3-4 weeks with Optical Coherence Tomography, no dilation  Consider changing Eylea to Vabysmo or ozurdex inj if recurrence of intraretinal fluid     ~~~~~~~~~~~~~~~~~~~~~~~~~~~~~~~~~~   Complete documentation of historical and exam elements from today's encounter can be found in the full encounter summary report (not reduplicated in this progress note).  I personally obtained the chief complaint(s) and history of present illness.  I confirmed and edited as necessary the review of systems, past medical/surgical history, family history, social history, and examination findings as documented by others; and I examined the patient myself.  I personally reviewed the relevant tests, images, and reports as documented above.  I formulated and edited as necessary the assessment and plan and discussed the findings and management plan with the patient and family and No resident or fellow assisted with the procedures performed.  I performed the procedures myself.    Enriqueta Martin MD  Associate  Professor of Ophthalmology.  Retina Service   Department of Ophthalmology and Visual Neurosciences   [facility]  Phone: [phone]   Fax: [phone]

## 2023-09-13 NOTE — TELEPHONE ENCOUNTER
[facility] Call Center    Phone Message    May a detailed message be left on voicemail: yes     Reason for Call: Other: Patient requesting to speak with Maggie about his appt on Friday. He states that it is not related to her message to him.  Please reach out to patient.     Action Taken: Message routed to:  [facility] Nephrology    Travel Screening: Not Applicable

## 2023-09-14 ENCOUNTER — OFFICE VISIT (OUTPATIENT)
Dept: ENDOCRINOLOGY | Facility: CLINIC | Age: 58
End: 2023-09-14
Payer: MEDICARE

## 2023-09-14 ENCOUNTER — TRANSFERRED RECORDS (OUTPATIENT)
Dept: FAMILY MEDICINE | Facility: CLINIC | Age: 58
End: 2023-09-14

## 2023-09-14 VITALS — SYSTOLIC BLOOD PRESSURE: 162 MMHG | DIASTOLIC BLOOD PRESSURE: 81 MMHG

## 2023-09-14 DIAGNOSIS — E11.49 TYPE II OR UNSPECIFIED TYPE DIABETES MELLITUS WITH NEUROLOGICAL MANIFESTATIONS, NOT STATED AS UNCONTROLLED(250.60) (H): Primary | ICD-10-CM

## 2023-09-14 DIAGNOSIS — E11.69 TYPE 2 DIABETES MELLITUS WITH DIABETIC FOOT DEFORMITY (H): ICD-10-CM

## 2023-09-14 DIAGNOSIS — Z79.4 TYPE 2 DIABETES MELLITUS WITH STAGE 3A CHRONIC KIDNEY DISEASE, WITH LONG-TERM CURRENT USE OF INSULIN (H): ICD-10-CM

## 2023-09-14 DIAGNOSIS — N18.31 TYPE 2 DIABETES MELLITUS WITH STAGE 3A CHRONIC KIDNEY DISEASE, WITH LONG-TERM CURRENT USE OF INSULIN (H): ICD-10-CM

## 2023-09-14 DIAGNOSIS — M21.969 TYPE 2 DIABETES MELLITUS WITH DIABETIC FOOT DEFORMITY (H): ICD-10-CM

## 2023-09-14 DIAGNOSIS — L60.2 ONYCHAUXIS: ICD-10-CM

## 2023-09-14 DIAGNOSIS — E11.22 TYPE 2 DIABETES MELLITUS WITH STAGE 3A CHRONIC KIDNEY DISEASE, WITH LONG-TERM CURRENT USE OF INSULIN (H): ICD-10-CM

## 2023-09-14 PROCEDURE — 99213 OFFICE O/P EST LOW 20 MIN: CPT | Performed by: PODIATRIST

## 2023-09-14 RX ORDER — SORAFENIB 200 MG/1
400 TABLET, FILM COATED ORAL DAILY
Qty: 120 TABLET | Refills: 0 | COMMUNITY
Start: 2023-09-14 | End: 2023-09-15

## 2023-09-14 RX ORDER — AMMONIUM LACTATE 12 G/100G
CREAM TOPICAL DAILY PRN
Qty: 140 G | Refills: 5 | Status: SHIPPED | OUTPATIENT
Start: 2023-09-14 | End: 2024-05-10

## 2023-09-14 NOTE — PROGRESS NOTES
Past Medical History:   Diagnosis Date    Anemia 2013    Arthritis     BPH (benign prostatic hyperplasia)     CAD (coronary artery disease) 04/01/2019    Cholelithiasis     Conductive hearing loss 08/16/2017    Depressive disorder 1986    Suffer effects throughout life    Gastroesophageal reflux disease 12/01/2014    HCC (hepatocellular carcinoma) (H) 01/22/2019    History of diabetic retinopathy 07/2018    HTN (hypertension) 11/20/2019    Hyperlipidemia     Liver cirrhosis secondary to ESTRADA (H)     Liver transplanted (H) 11/11/2019    Portal vein thrombosis 04/11/2019    Type II diabetes mellitus (H)      Patient Active Problem List   Diagnosis    Bipolar affective disorder in remission (H)    Esophageal varices determined by endoscopy (H)    Erectile dysfunction due to diseases classified elsewhere    HCC (hepatocellular carcinoma) (H)    Equivocal stress echocardiogram    Type 2 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, unspecified whether long term insulin use (H)    Status post coronary angiogram    CAD (coronary artery disease)    Liver transplant recipient (H)    Status post liver transplantation (H)    Immunosuppressed status (H)    Benign essential hypertension    Chronic kidney disease, stage 3a (H)    Anemia of chronic renal failure, stage 3a (H)    History of coronary artery disease    Dyslipidemia    Excessive sweating    Stage 3b chronic kidney disease (H)    Anemia of chronic renal failure, stage 3b (H)    NSTEMI (non-ST elevated myocardial infarction) (H)    Chest pain, unspecified type    Hypertensive chronic kidney disease with stage 5 chronic kidney disease or end stage renal disease (H)    Vitreous hemorrhage of left eye (H)    Proliferative diabetic retinopathy of both eyes associated with type 2 diabetes mellitus, unspecified proliferative retinopathy type (H)    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)    Liver replaced by transplant  (H)    Hyperkalemia    Acute renal failure, unspecified acute renal failure type (H)     Past Surgical History:   Procedure Laterality Date    BYPASS GRAFT ARTERY CORONARY N/A 7/14/2021    Procedure: median sternotomy, on cardiopulmonary bypass, CORONARY ARTERY BYPASS GRAFT (CABG) x2 with left greater saphenous vein endoscopic harvest and left internal mammery artery harvest;  Surgeon: Tom Zapata MD;  Location: [facility] OR    COLONOSCOPY      2015    COLONOSCOPY N/A 12/6/2019    Procedure: COLONOSCOPY, WITH POLYPECTOMY AND BIOPSY;  Surgeon: Adam Morton MD;  Location:  GI    CV CENTRAL VENOUS CATHETER PLACEMENT N/A 7/12/2021    Procedure: Central Venous Catheter Placement;  Surgeon: Fermin Polanco MD;  Location:  HEART CARDIAC CATH LAB    CV CORONARY ANGIOGRAM N/A 7/12/2021    Procedure: Coronary Angiogram;  Surgeon: Fermin Polanco MD;  Location:  HEART CARDIAC CATH LAB    CV HEART CATHETERIZATION WITH POSSIBLE INTERVENTION N/A 2/26/2019    Procedure: CORS;  Surgeon: Jagdish Hoyt MD;  Location:  HEART CARDIAC CATH LAB    CV INTRA AORTIC BALLOON N/A 7/12/2021    Procedure: Intra Aortic Balloon Pump Insertion;  Surgeon: Fermin Polanco MD;  Location:  HEART CARDIAC CATH LAB    ESOPHAGOSCOPY, GASTROSCOPY, DUODENOSCOPY (EGD), COMBINED N/A 11/17/2016    Procedure: COMBINED ESOPHAGOSCOPY, GASTROSCOPY, DUODENOSCOPY (EGD);  Surgeon: Santi Rosas MD;  Location:  GI    ESOPHAGOSCOPY, GASTROSCOPY, DUODENOSCOPY (EGD), COMBINED N/A 11/17/2017    Procedure: COMBINED ESOPHAGOSCOPY, GASTROSCOPY, DUODENOSCOPY (EGD);  EGD;  Surgeon: Santi Rosas MD;  Location:  GI    ESOPHAGOSCOPY, GASTROSCOPY, DUODENOSCOPY (EGD), COMBINED N/A 12/28/2018    Procedure: EGD;  Surgeon: Santi Rosas MD;  Location:  OR    ESOPHAGOSCOPY, GASTROSCOPY, DUODENOSCOPY (EGD), COMBINED N/A 12/6/2019    Procedure: ESOPHAGOGASTRODUODENOSCOPY, WITH  BIOPSY;  Surgeon: Adam Morton MD;  Location: [facility] GI    ESOPHAGOSCOPY, GASTROSCOPY, DUODENOSCOPY (EGD), COMBINED N/A 2020    Procedure: ESOPHAGOGASTRODUODENOSCOPY (EGD);  Surgeon: Santi Rosas MD;  Location:  GI    EXCISE MASS ABDOMEN N/A 2023    Procedure: Laparoscopic lysis of adhesions, laparoscopic resection of abdominal mass;  Surgeon: Ruiz Chu MD;  Location: [facility] OR    HEAD & NECK SURGERY      2017 at [facility].     IMPLANT GOLD WEIGHT EYELID Right 2017    Procedure: IMPLANT WEIGHT EYELID;  Right Upper Eyelid Weight, right tarsal strip lower eyelid;  Surgeon: Milana Malave MD;  Location: [facility] OR    IR CHEMO EMBOLIZATION  2019    KNEE SURGERY Left     ORTHOPEDIC SURGERY      PAROTIDECTOMY, RADICAL NECK DISSECTION Right 2017    Procedure: PAROTIDECTOMY, RADICAL NECK DISSECTION;  Right Superfacial Parotidectomy , Facial nerve repair. with [facility] facial nerve monitor.;  Surgeon: Asiya Morgan MD;  Location: [facility] OR    PHACOEMULSIFICATION CLEAR CORNEA WITH STANDARD INTRAOCULAR LENS IMPLANT Right 2023    Procedure: PHACOEMULSIFICATION, COMPLEX CATARACT, WITH INTRAOCULAR LENS IMPLANT WITH TRYPAN RIGHT EYE;  Surgeon: Enriqueta Martin MD;  Location: [facility] OR    PHACOEMULSIFICATION WITH STANDARD INTRAOCULAR LENS IMPLANT Left 1/3/2023    Procedure: PHACOEMULSIFICATION, COMPLEX CATARACT, WITH STANDARD INTRAOCULAR LENS IMPLANT INSERTION LEFT EYE;  Surgeon: Enriqueta Martin MD;  Location: [facility] OR    PICC INSERTION Left 2017    4fr SL BioFlo PICC, 44cm in the L basilic vein w/ tip in the low SVC    RETURN LIVER TRANSPLANT N/A 2019    Procedure: Exploratory laparotomy, hematoma evacuation, abdominal washout;  Surgeon: Александр Ramos MD;  Location: [facility] OR    TRANSPLANT LIVER RECIPIENT  DONOR N/A 2019    Procedure: TRANSPLANT, LIVER, RECIPIENT,  DONOR;  Surgeon: Александр Ramos MD;  Location: [facility] OR    VASCULAR SURGERY        Social History     Socioeconomic History    Marital status:      Spouse name: Not on file    Number of children: Not on file    Years of education: Not on file    Highest education level: Bachelor's degree (e.g., BA, AB, BS)   Occupational History    Not on file   Tobacco Use    Smoking status: Former     Packs/day: 6.00     Years: 30.00     Pack years: 180.00     Types: Cigars, Cigarettes     Start date: 2016     Quit date: 10/25/2017     Years since quittin.8     Passive exposure: Past    Smokeless tobacco: Former     Types: Chew     Quit date: 10/31/2017    Tobacco comments:     1 tin per week   Vaping Use    Vaping Use: Never used   Substance and Sexual Activity    Alcohol use: No     Alcohol/week: 0.0 standard drinks of alcohol     Comment: quit 1996    Drug use: No    Sexual activity: Not Currently     Partners: Female     Birth control/protection: Condom   Other Topics Concern    Parent/sibling w/ CABG, MI or angioplasty before 65F 55M? Yes   Social History Narrative    Prior [facility], [facility]     Social Determinants of Health     Financial Resource Strain: Low Risk  (10/13/2020)    Overall Financial Resource Strain (CARDIA)     Difficulty of Paying Living Expenses: Not very hard   Food Insecurity: No Food Insecurity (10/13/2020)    Hunger Vital Sign     Worried About Running Out of Food in the Last Year: Never true     Ran Out of Food in the Last Year: Never true   Transportation Needs: No Transportation Needs (10/13/2020)    PRAPARE - Transportation     Lack of Transportation (Medical): No     Lack of Transportation (Non-Medical): No   Physical Activity: Insufficiently Active (10/13/2020)    Exercise Vital Sign     Days of Exercise per Week: 1 day     Minutes of Exercise per Session: 60 min   Stress: Stress Concern Present (10/13/2020)    [employer] - Occupational Stress Questionnaire     Feeling of Stress : To some extent   Social Connections:  Socially Isolated (10/13/2020)    Social Connection and Isolation Panel [NHANES]     Frequency of Communication with Friends and Family: Once a week     Frequency of Social Gatherings with Friends and Family: Once a week     Attends Zoroastrianism Services: Never     Active Member of Clubs or Organizations: No     Attends Club or Organization Meetings: Never     Marital Status:    Intimate Partner Violence: Not At Risk (6/28/2023)    Humiliation, Afraid, Rape, and Kick questionnaire     Fear of Current or Ex-Partner: No     Emotionally Abused: No     Physically Abused: No     Sexually Abused: No   Housing Stability: Low Risk  (10/13/2020)    Housing Stability Vital Sign     Unable to Pay for Housing in the Last Year: No     Number of Places Lived in the Last Year: 2     Unstable Housing in the Last Year: No     Family History   Problem Relation Age of Onset    Skin Cancer Mother     Cancer Mother         mastectomy    Diabetes Mother     Cerebrovascular Disease Mother     Thyroid Disease Mother     Depression Mother     Colon Cancer Father 60    Pancreatic Cancer Father 60    Prostate Cancer Father     Macular Degeneration Father     Glaucoma Father     Skin Cancer Father     Thyroid Disease Sister     Depression Sister     Asthma Sister     Sleep Apnea Brother     Colorectal Cancer Maternal Grandmother     Cancer Maternal Grandmother     Substance Abuse Maternal Grandmother         Alcohol    Prostate Cancer Maternal Grandfather     Substance Abuse Maternal Grandfather         Alcohol    Colorectal Cancer Paternal Grandmother     Liver Disease No family hx of     Melanoma No family hx of     Anesthesia Reaction No family hx of     Deep Vein Thrombosis (DVT) No family hx of        Lab Results   Component Value Date    A1C 6.3 06/14/2023    A1C 6.9 12/14/2022    A1C 6.0 01/10/2022    A1C 6.8 07/13/2021    A1C 6.0 12/30/2020    A1C 6.3 06/13/2020    A1C 6.6 08/08/2018    A1C 6.5 06/09/2017    A1C 7.8 10/25/2016      Lab Results   Component Value Date    WBC 4.9 09/07/2023    WBC 4.4 06/28/2021     Lab Results   Component Value Date    RBC 3.67 09/07/2023    RBC 2.35 06/28/2021     Lab Results   Component Value Date    HGB 11.5 09/07/2023    HGB 7.3 06/28/2021     Lab Results   Component Value Date    HCT 35.8 09/07/2023    HCT 22.6 06/28/2021     No components found for: MCT  Lab Results   Component Value Date    MCV 98 09/07/2023    MCV 96 06/28/2021     Lab Results   Component Value Date    MCH 31.3 09/07/2023    MCH 31.1 06/28/2021     Lab Results   Component Value Date    MCHC 32.1 09/07/2023    MCHC 32.3 06/28/2021     Lab Results   Component Value Date    RDW 15.4 09/07/2023    RDW 15.1 06/28/2021     Lab Results   Component Value Date     09/07/2023     06/28/2021     Last Comprehensive Metabolic Panel:  Lab Results   Component Value Date     09/07/2023    POTASSIUM 4.5 09/07/2023    CHLORIDE 103 09/07/2023    CO2 25 09/07/2023    ANIONGAP 10 09/07/2023     (H) 09/07/2023    BUN 33.5 (H) 09/07/2023    CR 1.50 (H) 09/07/2023    GFRESTIMATED 53 (L) 09/07/2023    DEBORAH 10.0 09/07/2023       Answers submitted by the patient for this visit:  Symptoms you have experienced in the last 30 days (Submitted on 9/7/2023)  General Symptoms: Yes  Skin Symptoms: Yes  HENT Symptoms: No  EYE SYMPTOMS: Yes  HEART SYMPTOMS: No  LUNG SYMPTOMS: No  INTESTINAL SYMPTOMS: No  URINARY SYMPTOMS: No  REPRODUCTIVE SYMPTOMS: No  SKELETAL SYMPTOMS: Yes  BLOOD SYMPTOMS: No  NERVOUS SYSTEM SYMPTOMS: No  MENTAL HEALTH SYMPTOMS: No  Please answer the questions below to tell us what conditions you are experiencing: (Submitted on 9/7/2023)  Fever: No  Loss of appetite: No  Weight loss: Yes  Weight gain: No  Fatigue: Yes  Night sweats: No  Chills: No  Increased stress: Yes  Excessive hunger: No  Excessive thirst: No  Feeling hot or cold when others believe the temperature is normal: No  Loss of height: No  Post-operative  complications: No  Surgical site pain: No  Hallucinations: No  Change in or Loss of Energy: Yes  Hyperactivity: No  Confusion: No   (Submitted on 9/7/2023)  Changes in hair: Yes  Changes in moles/birth marks: No  Itching: Yes  Rashes: No  Changes in nails: Yes  Acne: Yes  Change in facial hair: Yes  Warts: No  Non-healing sores: Yes  Scarring: No  Flaking of skin: Yes  Color changes of hands/feet in cold : Yes  Sun sensitivity: Yes  Skin thickening: No  Please answer the questions below to tell us what conditions you are experiencing: (Submitted on 9/7/2023)  Eye pain: Yes  Vision loss: No  Dry eyes: Yes  Watery eyes: No  Eye bulging: No  Double vision: No  Flashing of lights: Yes  Spots: No  Floaters: No  Redness: No  Crossed eyes: No  Tunnel Vision: No  Yellowing of eyes: No  Eye irritation: Yes  Please answer the questions below to tell us what condition you are experiencing: (Submitted on 9/7/2023)  Back pain: Yes  Muscle aches: Yes  Neck pain: Yes  Swollen joints: No  Joint pain: Yes  Bone pain: Yes  Muscle cramps: Yes  Muscle weakness: Yes  Joint stiffness: Yes  Bone fracture: No        SUBJECTIVE FINDINGS:  A 58-year-old male returns to clinic for Diabetic foot cares.  He relates he is doing okay.  Relates he has numbness, tingling and neuropathy in his feet and he feels it is worsenting.  Relates no ulcers or sores since we have seen him last.  Relates he needs his toenails trimmed and he had to self trim in between visits because the group too fast.  Relates he is using the Amalactin.  No other specific relieving or aggravating factors.  He relates he does have Diabetic shoes that he wears and is not sure when he can get new ones as they are wearing.       OBJECTIVE FINDINGS:  DP and PT 2/4 bilaterally.  He has dorsally contracted digits 2-5 bilaterally.  He has incurvated nails with minimal thickening and dystrophy 1-5 bilaterally to differing degrees.  There is no erythema, no drainage, no odor, no calor  bilaterally.  There are no gross tendon voids bilaterally.  He has a laterally deviated hallux bilaterally.       ASSESSMENT/PLAN:  Onychauxis bilaterally.  Diabetes with peripheral Neuropathy and foot deformity.  His protective sensation is intact.  Diagnosis and treatment options discussed with him.  All the nails were reduced bilaterally upon consent.  Continue Diabetic shoes, new order for diabetic shoes and inserts given and he is given the contact information for orthotics and prosthetics lab.  Refill for Amlactin cream given.  Return to clinic and see me in 2-3 months.  Previous notes reviewed.                      Moderate level of medical decision making.

## 2023-09-14 NOTE — LETTER
9/14/2023       RE: Frandy Workman  [address] [facility] [address]     Dear Colleague,    Thank you for referring your patient, Frandy Workman, to the [facility] [place] [facility]. Please see a copy of my visit note below.    Past Medical History:   Diagnosis Date    Anemia 2013    Arthritis     BPH (benign prostatic hyperplasia)     CAD (coronary artery disease) 04/01/2019    Cholelithiasis     Conductive hearing loss 08/16/2017    Depressive disorder 1986    Suffer effects throughout life    Gastroesophageal reflux disease 12/01/2014    HCC (hepatocellular carcinoma) (H) 01/22/2019    History of diabetic retinopathy 07/2018    HTN (hypertension) 11/20/2019    Hyperlipidemia     Liver cirrhosis secondary to ESTRADA (H)     Liver transplanted (H) 11/11/2019    Portal vein thrombosis 04/11/2019    Type II diabetes mellitus (H)      Patient Active Problem List   Diagnosis    Bipolar affective disorder in remission (H)    Esophageal varices determined by endoscopy (H)    Erectile dysfunction due to diseases classified elsewhere    HCC (hepatocellular carcinoma) (H)    Equivocal stress echocardiogram    Type 2 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, unspecified whether long term insulin use (H)    Status post coronary angiogram    CAD (coronary artery disease)    Liver transplant recipient (H)    Status post liver transplantation (H)    Immunosuppressed status (H)    Benign essential hypertension    Chronic kidney disease, stage 3a (H)    Anemia of chronic renal failure, stage 3a (H)    History of coronary artery disease    Dyslipidemia    Excessive sweating    Stage 3b chronic kidney disease (H)    Anemia of chronic renal failure, stage 3b (H)    NSTEMI (non-ST elevated myocardial infarction) (H)    Chest pain, unspecified type    Hypertensive chronic kidney disease with stage 5  chronic kidney disease or end stage renal disease (H)    Vitreous hemorrhage of left eye (H)    Proliferative diabetic retinopathy of both eyes associated with type 2 diabetes mellitus, unspecified proliferative retinopathy type (H)    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)    Liver replaced by transplant (H)    Hyperkalemia    Acute renal failure, unspecified acute renal failure type (H)     Past Surgical History:   Procedure Laterality Date    BYPASS GRAFT ARTERY CORONARY N/A 7/14/2021    Procedure: median sternotomy, on cardiopulmonary bypass, CORONARY ARTERY BYPASS GRAFT (CABG) x2 with left greater saphenous vein endoscopic harvest and left internal mammery artery harvest;  Surgeon: Tom Zapata MD;  Location: [facility] OR    COLONOSCOPY      2015    COLONOSCOPY N/A 12/6/2019    Procedure: COLONOSCOPY, WITH POLYPECTOMY AND BIOPSY;  Surgeon: Adam Morton MD;  Location:  GI    CV CENTRAL VENOUS CATHETER PLACEMENT N/A 7/12/2021    Procedure: Central Venous Catheter Placement;  Surgeon: Fermin Polanco MD;  Location:  HEART CARDIAC CATH LAB    CV CORONARY ANGIOGRAM N/A 7/12/2021    Procedure: Coronary Angiogram;  Surgeon: Fermin Polanco MD;  Location:  HEART CARDIAC CATH LAB    CV HEART CATHETERIZATION WITH POSSIBLE INTERVENTION N/A 2/26/2019    Procedure: CORS;  Surgeon: Jagdish Hoyt MD;  Location:  HEART CARDIAC CATH LAB    CV INTRA AORTIC BALLOON N/A 7/12/2021    Procedure: Intra Aortic Balloon Pump Insertion;  Surgeon: Fermin Polanco MD;  Location: [facility] CARDIAC CATH LAB    ESOPHAGOSCOPY, GASTROSCOPY, DUODENOSCOPY (EGD), COMBINED N/A 11/17/2016    Procedure: COMBINED ESOPHAGOSCOPY, GASTROSCOPY, DUODENOSCOPY (EGD);  Surgeon: Santi Rosas MD;  Location:  GI    ESOPHAGOSCOPY, GASTROSCOPY, DUODENOSCOPY (EGD), COMBINED N/A 11/17/2017    Procedure: COMBINED ESOPHAGOSCOPY, GASTROSCOPY, DUODENOSCOPY (EGD);  EGD;  Surgeon:  Santi Rossa MD;  Location: [facility] GI    ESOPHAGOSCOPY, GASTROSCOPY, DUODENOSCOPY (EGD), COMBINED N/A 12/28/2018    Procedure: EGD;  Surgeon: Santi Rosas MD;  [facility]    ESOPHAGOSCOPY, GASTROSCOPY, DUODENOSCOPY (EGD), COMBINED N/A 12/6/2019    Procedure: ESOPHAGOGASTRODUODENOSCOPY, WITH BIOPSY;  Surgeon: Adam Morton MD;  Location: [facility] GI    ESOPHAGOSCOPY, GASTROSCOPY, DUODENOSCOPY (EGD), COMBINED N/A 2/13/2020    Procedure: ESOPHAGOGASTRODUODENOSCOPY (EGD);  Surgeon: Santi Rosas MD;  [facility]    EXCISE MASS ABDOMEN N/A 7/13/2023    Procedure: Laparoscopic lysis of adhesions, laparoscopic resection of abdominal mass;  Surgeon: Ruiz Chu MD;  Location: [facility] OR    HEAD & NECK SURGERY      12/2017 at [facility].     IMPLANT GOLD WEIGHT EYELID Right 11/16/2017    Procedure: IMPLANT WEIGHT EYELID;  Right Upper Eyelid Weight, right tarsal strip lower eyelid;  Surgeon: Milana Malave MD;  [facility]    IR CHEMO EMBOLIZATION  1/22/2019    KNEE SURGERY Left     ORTHOPEDIC SURGERY      PAROTIDECTOMY, RADICAL NECK DISSECTION Right 11/2/2017    Procedure: PAROTIDECTOMY, RADICAL NECK DISSECTION;  Right Superfacial Parotidectomy , Facial nerve repair. with [facility] facial nerve monitor.;  Surgeon: Asiya Morgan MD;  Location: [facility]    PHACOEMULSIFICATION CLEAR CORNEA WITH STANDARD INTRAOCULAR LENS IMPLANT Right 1/24/2023    Procedure: PHACOEMULSIFICATION, COMPLEX CATARACT, WITH INTRAOCULAR LENS IMPLANT WITH TRYPAN RIGHT EYE;  Surgeon: Enriqueta Martin MD;  Location: [facility] OR    PHACOEMULSIFICATION WITH STANDARD INTRAOCULAR LENS IMPLANT Left 1/3/2023    Procedure: PHACOEMULSIFICATION, COMPLEX CATARACT, WITH STANDARD INTRAOCULAR LENS IMPLANT INSERTION LEFT EYE;  Surgeon: Enriqueta Martin MD;  Location: [facility] OR    PICC INSERTION Left 11/06/2017    4fr SL BioFlo PICC, 44cm in the L basilic vein w/ tip in the low SVC    RETURN LIVER TRANSPLANT N/A 11/12/2019     Procedure: Exploratory laparotomy, hematoma evacuation, abdominal washout;  Surgeon: Александр Ramos MD;  Location: [facility] OR    TRANSPLANT LIVER RECIPIENT  DONOR N/A 2019    Procedure: TRANSPLANT, LIVER, RECIPIENT,  DONOR;  Surgeon: Александр Ramos MD;  Location: [facility] OR    VASCULAR SURGERY       Social History     Socioeconomic History    Marital status:      Spouse name: Not on file    Number of children: Not on file    Years of education: Not on file    Highest education level: Bachelor's degree (e.g., BA, AB, BS)   Occupational History    Not on file   Tobacco Use    Smoking status: Former     Packs/day: 6.00     Years: 30.00     Pack years: 180.00     Types: Cigars, Cigarettes     Start date: 2016     Quit date: 10/25/2017     Years since quittin.8     Passive exposure: Past    Smokeless tobacco: Former     Types: Chew     Quit date: 10/31/2017    Tobacco comments:     1 tin per week   Vaping Use    Vaping Use: Never used   Substance and Sexual Activity    Alcohol use: No     Alcohol/week: 0.0 standard drinks of alcohol     Comment: quit 1996    Drug use: No    Sexual activity: Not Currently     Partners: Female     Birth control/protection: Condom   Other Topics Concern    Parent/sibling w/ CABG, MI or angioplasty before 65F 55M? Yes   Social History Narrative    [facility]     Social Determinants of Health     Financial Resource Strain: Low Risk  (10/13/2020)    Overall Financial Resource Strain (CARDIA)     Difficulty of Paying Living Expenses: Not very hard   Food Insecurity: No Food Insecurity (10/13/2020)    Hunger Vital Sign     Worried About Running Out of Food in the Last Year: Never true     Ran Out of Food in the Last Year: Never true   Transportation Needs: No Transportation Needs (10/13/2020)    PRAPARE - Transportation     Lack of Transportation (Medical): No     Lack of Transportation (Non-Medical): No   Physical Activity:  Insufficiently Active (10/13/2020)    Exercise Vital Sign     Days of Exercise per Week: 1 day     Minutes of Exercise per Session: 60 min   Stress: Stress Concern Present (10/13/2020)    [place] [employer] - Occupational Stress Questionnaire     Feeling of Stress : To some extent   Social Connections: Socially Isolated (10/13/2020)    Social Connection and Isolation Panel [NHANES]     Frequency of Communication with Friends and Family: Once a week     Frequency of Social Gatherings with Friends and Family: Once a week     Attends Mandaeism Services: Never     Active Member of Clubs or Organizations: No     Attends Club or Organization Meetings: Never     Marital Status:    Intimate Partner Violence: Not At Risk (6/28/2023)    Humiliation, Afraid, Rape, and Kick questionnaire     Fear of Current or Ex-Partner: No     Emotionally Abused: No     Physically Abused: No     Sexually Abused: No   Housing Stability: Low Risk  (10/13/2020)    Housing Stability Vital Sign     Unable to Pay for Housing in the Last Year: No     Number of Places Lived in the Last Year: 2     Unstable Housing in the Last Year: No     Family History   Problem Relation Age of Onset    Skin Cancer Mother     Cancer Mother         mastectomy    Diabetes Mother     Cerebrovascular Disease Mother     Thyroid Disease Mother     Depression Mother     Colon Cancer Father 60    Pancreatic Cancer Father 60    Prostate Cancer Father     Macular Degeneration Father     Glaucoma Father     Skin Cancer Father     Thyroid Disease Sister     Depression Sister     Asthma Sister     Sleep Apnea Brother     Colorectal Cancer Maternal Grandmother     Cancer Maternal Grandmother     Substance Abuse Maternal Grandmother         Alcohol    Prostate Cancer Maternal Grandfather     Substance Abuse Maternal Grandfather         Alcohol    Colorectal Cancer Paternal Grandmother     Liver Disease No family hx of     Melanoma No family hx of      Anesthesia Reaction No family hx of     Deep Vein Thrombosis (DVT) No family hx of        Lab Results   Component Value Date    A1C 6.3 06/14/2023    A1C 6.9 12/14/2022    A1C 6.0 01/10/2022    A1C 6.8 07/13/2021    A1C 6.0 12/30/2020    A1C 6.3 06/13/2020    A1C 6.6 08/08/2018    A1C 6.5 06/09/2017    A1C 7.8 10/25/2016     Lab Results   Component Value Date    WBC 4.9 09/07/2023    WBC 4.4 06/28/2021     Lab Results   Component Value Date    RBC 3.67 09/07/2023    RBC 2.35 06/28/2021     Lab Results   Component Value Date    HGB 11.5 09/07/2023    HGB 7.3 06/28/2021     Lab Results   Component Value Date    HCT 35.8 09/07/2023    HCT 22.6 06/28/2021     No components found for: MCT  Lab Results   Component Value Date    MCV 98 09/07/2023    MCV 96 06/28/2021     Lab Results   Component Value Date    MCH 31.3 09/07/2023    MCH 31.1 06/28/2021     Lab Results   Component Value Date    MCHC 32.1 09/07/2023    MCHC 32.3 06/28/2021     Lab Results   Component Value Date    RDW 15.4 09/07/2023    RDW 15.1 06/28/2021     Lab Results   Component Value Date     09/07/2023     06/28/2021     Last Comprehensive Metabolic Panel:  Lab Results   Component Value Date     09/07/2023    POTASSIUM 4.5 09/07/2023    CHLORIDE 103 09/07/2023    CO2 25 09/07/2023    ANIONGAP 10 09/07/2023     (H) 09/07/2023    BUN 33.5 (H) 09/07/2023    CR 1.50 (H) 09/07/2023    GFRESTIMATED 53 (L) 09/07/2023    DEBORAH 10.0 09/07/2023     SUBJECTIVE FINDINGS:  A 58-year-old male returns to clinic for Diabetic foot cares.  He relates he is doing okay.  Relates he has numbness, tingling and neuropathy in his feet and he feels it is worsenting.  Relates no ulcers or sores since we have seen him last.  Relates he needs his toenails trimmed and he had to self trim in between visits because the group too fast.  Relates he is using the Amalactin.  No other specific relieving or aggravating factors.  He relates he does have Diabetic  shoes that he wears and is not sure when he can get new ones as they are wearing.       OBJECTIVE FINDINGS:  DP and PT 2/4 bilaterally.  He has dorsally contracted digits 2-5 bilaterally.  He has incurvated nails with minimal thickening and dystrophy 1-5 bilaterally to differing degrees.  There is no erythema, no drainage, no odor, no calor bilaterally.  There are no gross tendon voids bilaterally.  He has a laterally deviated hallux bilaterally.       ASSESSMENT/PLAN:  Onychauxis bilaterally.  Diabetes with peripheral Neuropathy and foot deformity.  His protective sensation is intact.  Diagnosis and treatment options discussed with him.  All the nails were reduced bilaterally upon consent.  Continue Diabetic shoes, new order for diabetic shoes and inserts given and he is given the contact information for orthotics and prosthetics lab.  Refill for Amlactin cream given.  Return to clinic and see me in 2-3 months.  Previous notes reviewed.        Moderate level of medical decision making.      Again, thank you for allowing me to participate in the care of your patient.      Sincerely,    Lamin Gonzalez DPM

## 2023-09-14 NOTE — TELEPHONE ENCOUNTER
Spoke to patient. He wanted to report his concern regarding high blood pressure over the last week 140-150/80 HR 100s in am and 160-180/89-90 HR 100s in pm. Patient reports he was instructed to stop taking lisinopril and jardiance during last hosp stay as his Creatinine levels were elevated and has not been on since. Patient reports no chest pain or shortness of breath.    Reviewed with Dr Rao, will discuss at tomorrows appointment.  Writer called patient back and mentioned recommendation per Dr Rao.  Lupe Owens LPN  Nephrology  [phone]

## 2023-09-15 ENCOUNTER — MYC MEDICAL ADVICE (OUTPATIENT)
Dept: ONCOLOGY | Facility: CLINIC | Age: 58
End: 2023-09-15

## 2023-09-15 ENCOUNTER — OFFICE VISIT (OUTPATIENT)
Dept: NEPHROLOGY | Facility: CLINIC | Age: 58
End: 2023-09-15
Attending: INTERNAL MEDICINE
Payer: MEDICARE

## 2023-09-15 VITALS
BODY MASS INDEX: 26.55 KG/M2 | OXYGEN SATURATION: 99 % | HEART RATE: 99 BPM | DIASTOLIC BLOOD PRESSURE: 82 MMHG | TEMPERATURE: 98.6 F | WEIGHT: 179.8 LBS | SYSTOLIC BLOOD PRESSURE: 143 MMHG

## 2023-09-15 DIAGNOSIS — C78.6 MALIGNANT NEOPLASM METASTATIC TO PERITONEUM (H): ICD-10-CM

## 2023-09-15 DIAGNOSIS — I12.9 HYPERTENSION, RENAL: ICD-10-CM

## 2023-09-15 DIAGNOSIS — C22.0 HCC (HEPATOCELLULAR CARCINOMA) (H): ICD-10-CM

## 2023-09-15 DIAGNOSIS — N18.32 STAGE 3B CHRONIC KIDNEY DISEASE (CKD) (H): Primary | ICD-10-CM

## 2023-09-15 PROCEDURE — 99214 OFFICE O/P EST MOD 30 MIN: CPT | Performed by: INTERNAL MEDICINE

## 2023-09-15 PROCEDURE — G0463 HOSPITAL OUTPT CLINIC VISIT: HCPCS | Performed by: INTERNAL MEDICINE

## 2023-09-15 RX ORDER — SORAFENIB 200 MG/1
200 TABLET, FILM COATED ORAL DAILY
Qty: 120 TABLET | Refills: 0 | COMMUNITY
Start: 2023-09-15 | End: 2023-09-28 | Stop reason: DRUGHIGH

## 2023-09-15 RX ORDER — NIFEDIPINE 60 MG/1
60 TABLET, EXTENDED RELEASE ORAL DAILY
Qty: 30 TABLET | Refills: 11 | Status: SHIPPED | OUTPATIENT
Start: 2023-09-15 | End: 2023-10-02

## 2023-09-15 ASSESSMENT — PAIN SCALES - GENERAL: PAINLEVEL: MODERATE PAIN (5)

## 2023-09-15 NOTE — PROGRESS NOTES
Nephrology Clinic     DOS:  09/15/2023     Name: Frandy Workman  MRN: [id number]  Age: 58 year old  : 1965  Referring provider: Natalie Russell     Assessment and Plan:  1. CKD, stage 3b (A3): Prior to recent liver transplant baseline Cr ~1.1 mg/dL with eGFR of 75 ml/min. Post-transplant creatinine has gradually fluctuated betwen  ~1.6-1.8 mg/dL (eGFR of 40 ml/min) and remains relatively stable though his Cr is much better more recently and seems to fluctuate from 1.2-1.4 mg/dL with an eGFR of 65 ml/min. Etiology of CKD likely multifactorial and related to some degree of diabetic changes further complicated by chronic changes from past lithium use (for 15 years) and further complicated by tacrolimus toxicity.  He is at risk of progressive CKD due diabetes.  As mentioned, he is now off of tacrolimus and continues on MMF and prednisone for his liver transplant.  He did have worsening albuminuria that significantly improved after restarting lisinopril  and empagliflozin.  However, both meds were discontinued after presenting with JERONIMO and hyperkalemia.  Fortunately, he has tolerated starting lisinopril at a low dose (5 mg) and we will attempt to start empagliflozin in near future.      -continue lisinopril at a low dose of 5 mg daily with goal of slowing progeession of CKD and minimizing albuminuria (albumin to Cr ratio ~800 mg/g today).     -RTC in 3 months      2. HTN/Volume status: Euvolemic to slightly hypervolemic on exam. His BP is above goal.  Clinic BP above goal today of at least <130/80.    -he will continue metoprolol XL at 12.5 mg and lisinopril 5 mg daily (RAAS blockade both for cardio and renal protection), we will attempt to start a CCB soon with nifedipine XL 60 mg daily  -continue empagliflozin which likely has led to better blood pressure control as well  -he will measure his BP daily and report to clinic for further adjustments.       3.  Electrolytes:  Potassium on higher side  in past.  Suspect multifactorial in nature and due to CKD with underlying type 4 RTA physiology, hyperglycemia and past use of tacrolimus. It did improve after discontinuation of tacrolimus.  -refered to a dietician in past for more education regarding a low potassium diet.   No specific intervention needed at this time.   -as mentioned, we will restart lisinopril today and recheck potassium in 1 week        4. Anemia: Possibly related to Imuran in the past, which has been discontinued.  Iron stores are replete.  Anemia of chronic disease and renal insufficiency also likely contributing.  He did IVELISSE treatment and hemoglobin improved to 13.9 in the past.  Hemoglobin dropped again requiring restarting IVELISSE therapy (and a blood transfusion).  Fortunately, hemoglobin now stable in the 12's.        -he will continue to follow in the anemia management clinic     5.  Fatigue:  Suspect related to multiple comorbidities and deconditioned state.  We did check a TSH that was within normal limits.     -monitor for now    Follow-up: RTC in 6 months     Reason For Visit:   CKD    HPI:   Frandy Workman is a 57 year old man who presents for CKD f/u.  His past medical history is remarkable for liver transplant (November, 2019) for ESTRADA cirrhosis (complicated by ascites and hepatic encephalopathy on lactulose and rifaximin in the past), hepatocellular carcinoma (s/p TACE and microwave ablation 01/2019), long standing DM 2 (complicated by nonproliferative diabetic retinopathy, macular edema and peripheral neuropathy), bipolar disorder (previously on lithium for 15 years), HTN, hyperlipidemia and CAD by angiography not requiring PCI.      He denies excessive use of NSAIDs in the past.  He had no history of nephrolithiasis or frequent UTIs.  He has no significant family history of CKD.     In terms of CKD, he appeared to have a baseline of ~1.1 mg/dL prior to his liver transplant in November 2019.  Creatinine after transplant was ~1.3  mg/dL at time of discharge and vikram to 3.2 mg/dL thought to be prerenal from volume depletion.  He was admitted for IVF and creatinine improved to 1.5 mg/dL at time of discharge.  His serum Cr now seems to fluctuate around 1.8 mg/dL with an eGFR of 40 ml/min.  He continues on tacrolimus for his liver transplant.  He no longer is on lasix for management of edema.  He has not required IVELISSE therapy in several months (last August, 2020).  He recently was started on low dose carvedilol for management of hypertension.  He also recently started empagliflozin for management of diabetes. He reports weight gain due to lack of exercise during the COVID pandemic.  His main complaint today is that of pain near his incisional scars for his liver transplant for which he is following up with hepatology.  His BP this morning was 129/84.      Interval History:  In July, 2021 he had unstble angina in the setting of severe anemia and eventually was noted to have a NSTEMI and underwent a CABG.  He was started on lisinopril and metoprolol but lisinopril was discontinued due to hypotension. His empagliflozin was discontinued during his recent hospitalization. Fortunately, we were able to restart both lisinopril and empagliflozin but both were discontinued when he presented with JERONIMO and hyperkalemia (requiring dialysis briefly) after starting treatment for metastatic hepatocellular cancer.  His BP was also elevated since starting therapy with sorafenib. Currently, he is on  metoprolol XL 12.5 mg and lisinopril 5 mg daily.  He underwent cardiac rehab and has recovered well.  Anemia has been an intermittent issue requiring blood transfusions and IVELISSE therapy in past.  He otherwise, has no major medical complaints.             Review of Systems:   Pertinent items are noted in HPI or as below, remainder of complete ROS is negative.      Active Medications:   Current Outpatient Medications   Medication     Alcohol Swabs (ALCOHOL PREP) 70 % PADS      ammonium lactate (AMLACTIN) 12 % external cream     aspirin (SM ASPIRIN ADULT LOW STRENGTH) 81 MG EC tablet     BD VIKTORIA U/F 32G X 4 MM insulin pen needle     benzoyl peroxide 5 % external liquid     blood glucose (NO BRAND SPECIFIED) lancets standard     Continuous Blood Gluc Sensor (FREESTYLE CLAUDIA 2 SENSOR) MISC     insulin aspart (NOVOLOG PEN) 100 UNIT/ML pen     insulin degludec (TRESIBA FLEXTOUCH) 100 UNIT/ML pen     ketoconazole (NIZORAL) 2 % external shampoo     lamiVUDine (EPIVIR) 100 MG tablet     metoprolol succinate ER (TOPROL XL) 25 MG 24 hr tablet     Multiple Vitamin (TAB-A-GLADIS) TABS     mycophenolate (GENERIC EQUIVALENT) 250 MG capsule     ondansetron (ZOFRAN ODT) 8 MG ODT tab     pantoprazole (PROTONIX) 40 MG EC tablet     predniSONE (DELTASONE) 5 MG tablet     rosuvastatin (CRESTOR) 20 MG tablet     SORAfenib (NEXAVAR) 200 MG tablet     tamsulosin (FLOMAX) 0.4 MG capsule     Vitamin D3 50 mcg (2000 units) tablet     blood glucose (NO BRAND SPECIFIED) test strip     blood glucose monitoring (NO BRAND SPECIFIED) meter device kit     Current Facility-Administered Medications   Medication     aflibercept (EYLEA) injection prefilled syringe 2 mg     aflibercept (EYLEA) injection prefilled syringe 2 mg     dexamethasone (OZURDEX) intravitreal implant 0.7 mg     dexamethasone (OZURDEX) intravitreal implant 0.7 mg     faricimab-svoa (VABYSMO) intravitreal injection 6 mg     faricimab-svoa (VABYSMO) intravitreal injection 6 mg     lidocaine (PF) (XYLOCAINE) injection 1 mL        Allergies:   Codeine and Seasonal allergies      Past Medical History:  Chronic kidney disease, stage 3  Type 2 diabetes mellitus  Bipolar affective disorder   Brow ptosis  Hepatocellular carcinoma  Coronary artery disease s/p CABG  Hypertension   Anemia   Anxiety   Mild recurrent major depression  Mild nonproliferative retinopathy  Gastroesophageal reflux disease   Cholelithiasis   Benign prostatic hypertrophy   Portal vein  thrombosis      Past Surgical History:  Liver transplant recipient   Coronary catheterization  Parotidectomy, radical neck dissection  Right eyelid weight placement  Orthopedic surgery    Family History:   Prostate cancer - maternal grandfather, father   Substance abuse - maternal grandfather, maternal grandmother   Colon cancer - father, paternal grandmother  Cancer - mother  Thyroid disease - mother, sister   Stroke - mother  Depression - mother, sister  Asthma - sister      Social History:   Presents to clinic alone  Tobacco Use: Former smoker, 180 pack year history, quit 2017.   Alcohol Use: quit September 1996  PCP: Lamin Ortiz      Physical Exam:  BP (!) 143/82   Pulse 99   Temp 98.6  F (37  C) (Oral)   Wt 81.6 kg (179 lb 12.8 oz)   SpO2 99%   BMI 26.55 kg/m     GA: NAD  HEENT: no scleral icterus, no cervical LAD  CV: regular, no rub, no JVD, no edema  PULM: Lungs CTA B  GI: abd soft, NT, ND  : no CVA tenderness  MSK: no joint effusions, trace LE edema  SKIN: no concerning rash  NEURO: no gross focal deficit     Laboratory:  CMP  Recent Labs   Lab Test 09/07/23  1014 08/24/23  0934 08/18/23  1146 08/16/23  1200 08/16/23  0748 08/16/23  0601 08/15/23  0853 08/15/23  0505 08/10/23  2159 08/10/23  2151 08/10/23  1844 08/10/23  1833 08/10/23  1024 07/28/23  1046 07/12/21  1036 06/28/21  1347 06/14/21  1322 06/04/21  1236 05/05/21  0909    139 140  --   --  138  --  141   < > 141   < > 138 139 130*   < > 137 139 138 139   POTASSIUM 4.5 4.6 4.6  --   --  4.6   < > 4.3   < > 5.9*   < > 8.0* 6.3* 5.5*   < > 4.6 4.7 4.6 4.5   CHLORIDE 103 106 106  --   --  107  --  106   < > 116*  --  112* 112* 103   < > 107 106 106 107   CO2 25 20* 19*  --   --  17*  --  20*   < > 9*  --  8* 9* 17*   < > 25 26 26 26   ANIONGAP 10 13 15  --   --  14  --  15   < > 16*  --  18* 18* 10   < > 5 7 6 6   * 212* 129* 185*   < > 204*   < > 141*   < > 113*   < > 130* 180* 60*   < > 208* 173* 156* 258*   BUN 33.5*  35.2* 61.5*  --   --  45.1*  --  43.5*   < > 105.0*  --  117.0* 107.0* 56.8*   < > 33* 29 39* 38*   CR 1.50* 1.71* 2.84*  --   --  1.84*  --  1.87*   < > 6.77*  --  7.38* 6.50* 2.46*   < > 1.62* 1.54* 1.64* 1.52*   GFRESTIMATED 53* 46* 25*  --   --  42*  --  41*   < > 9*  --  8* 9* 29*   < > 46* 49* 46* 50*   GFRESTBLACK  --   --   --   --   --   --   --   --   --   --   --   --   --   --   --  54* 57* 53* 58*   DEBORAH 10.0 9.3 9.3  --   --  8.6  --  8.7   < > 7.9*  --  8.6 9.1 9.1   < > 9.1 9.8 10.2* 9.6   MAG 2.0 1.6*  --   --   --  2.0  --  1.9   < > 2.1  --  2.3 2.3 2.2   < >  --   --  2.2  --    PHOS 2.7 2.2*  --   --   --   --   --   --   --   --   --   --  6.0* 3.4   < >  --   --   --   --    PROTTOTAL 7.5 6.8  --   --   --   --   --   --   --  6.0*  --  7.3 7.6 7.3   < > 7.4 7.8 7.8 7.8   ALBUMIN 4.9 4.4  --   --   --   --   --   --   --  3.4*  --  4.3 4.4 4.7   < > 4.0 4.1 4.2 4.2   BILITOTAL 0.5 0.2  --   --   --   --   --   --   --  0.2  --  0.3 0.4 0.4   < > 0.5 0.6 0.5 0.5   ALKPHOS 86 77  --   --   --   --   --   --   --  84  --  108 110 93   < > 98 106 108 112   AST 20 18  --   --   --   --   --   --   --  25  --  22 24 23   < > 9 8 10 13   ALT 19 20  --   --   --   --   --   --   --  16  --  26 27 25   < > 20 21 26 28    < > = values in this interval not displayed.     CBC  Recent Labs   Lab Test 09/07/23  1014 08/24/23  0934 08/18/23  1146 08/16/23  0601   HGB 11.5* 10.2* 11.0* 10.3*   WBC 4.9 4.7 6.7 4.0   RBC 3.67* 3.24* 3.46* 3.30*   HCT 35.8* 31.5* 33.6* 31.4*   MCV 98 97 97 95   MCH 31.3 31.5 31.8 31.2   MCHC 32.1 32.4 32.7 32.8   RDW 15.4* 13.3 13.1 12.9    151 129* 97*     INR  Recent Labs   Lab Test 08/10/23  1833 01/11/22  2200 07/14/21  1351 07/14/21  1215 11/12/19  1645 11/12/19  1400 11/12/19  0950 11/12/19  0346   INR 1.16* 1.13 1.28* 1.45*   < > 1.46*   < > 1.47*   PTT  --   --  46* 37  --  39*  --  57*    < > = values in this interval not displayed.     ABG  Recent Labs   Lab Test  08/11/23  0852 08/11/23  0209 08/11/23  0143 08/10/23  2152 01/10/22  2314 07/14/21  2049 07/14/21  1605 07/14/21  1352 07/14/21  1351 07/14/21  1252 07/14/21  1216   PH  --   --   --   --  7.37  --  7.37  --  7.29* 7.36 7.38   PCO2  --   --   --   --   --   --  37  --  44 35 36   PO2  --   --   --   --   --   --  87  --  145* 181* 306*   HCO3  --   --   --   --   --   --  21  --  21 20* 21   O2PER 22 21 21 0  --    < > 21   < > 5 50.0 75.0    < > = values in this interval not displayed.      URINE STUDIES  Recent Labs   Lab Test 08/11/23  0043 03/15/23  0915 01/11/22  0001 09/07/21  1115   COLOR Light Yellow Straw Light Yellow Yellow   APPEARANCE Clear Clear Clear Cloudy*   URINEGLC 300* >=1000* >=1000* 50*   URINEBILI Negative Negative Negative Negative   URINEKETONE Negative Negative Negative Negative   SG 1.009 1.023 1.018 1.022   UBLD Small* Trace* Negative Small*   URINEPH 5.0 5.5 5.0 5.0   PROTEIN 30* 50* Negative >499*   NITRITE Negative Negative Negative Negative   LEUKEST Negative Negative Negative Negative   RBCU 0 1 <1 1   WBCU 3 <1 1 1     Recent Labs   Lab Test 04/20/22  0919 10/04/21  0804 09/07/21  1115 02/26/21  0750 06/29/20  1008 03/02/20  1013 12/02/19  1040 07/08/19  1017 02/04/19  0705   UTPG 0.12 1.46* 1.17* 0.47* 0.89* 0.29* 1.22* 0.11 0.14     PTH  Recent Labs   Lab Test 03/15/23  0906 04/20/22  0912 10/04/21  0802 06/29/20  1005 07/08/19  1020   PTHI 78* 59 81* 61 54     IRON STUDIES   Recent Labs   Lab Test 04/25/22  0925 02/09/22  0850 11/16/21  1004 11/02/21  1031 07/12/21  1608 06/28/21  1347 06/04/21  1236 12/02/20  0739 11/11/20  0754 10/14/20  0836 09/16/20  0802 07/29/20  0749 06/29/20  1005 05/21/20  0723 04/22/20  0833 03/09/20  0823 01/27/20  1340 12/02/19  1034 12/28/18  1108 09/15/18  1215 06/09/17  1250   IRON 119 96 93 109  --  112 158 75 81 73 93 87 60 72 65 92  --  88  --  52  --     248 211* 241  --  226* 270 228* 227* 248 221* 230* 204* 192* 215* 231*  --  248  --   403  --    IRONSAT 42 39 44 45  --  50* 59* 33 36 29 42 38 29 38 30 40  --  36  --  13*  --    QUAN 508* 1,046* 400* 479* 543* 495* 399* 433* 286 323 223 193 352 344 643* 859* 690* 1,353* 90 10* 450*       All above labs reviewed by me.   Eloy Rao MD   [phone]

## 2023-09-15 NOTE — LETTER
9/15/2023       RE: Frandy Workman  [address] [facility] [address]     Dear Colleague,    Thank you for referring your patient, Frandy Workman, to the [facility] NEPHROLOGY CLINIC [place] [facility]. Please see a copy of my visit note below.      Nephrology Clinic     DOS:  09/15/2023     Name: Frandy Workman  MRN: [id number]  Age: 58 year old  : 1965  Referring provider: Natalie Russell     Assessment and Plan:  1. CKD, stage 3b (A3): Prior to recent liver transplant baseline Cr ~1.1 mg/dL with eGFR of 75 ml/min. Post-transplant creatinine has gradually fluctuated betwen  ~1.6-1.8 mg/dL (eGFR of 40 ml/min) and remains relatively stable though his Cr is more elevated today at ~2.2 mg/dL.  Unclear if this rise in Cr is due to progression of kidney disease or may be more  pre-renal in nature.  Etiology of CKD likely multifactorial and related to some degree of diabetic changes further complicated by chronic changes from past lithium use (for 15 years) and further complicated by tacrolimus toxicity.  He is at risk of progressive CKD due diabetes.  As mentioned, he is now off of tacrolimus and continues on MMF and prednisone for his liver transplant.  He did have worsening albuminuria that significantly improved after restarting lisinopril  and empagliflozin.  However, he reports that he is no longer taking lisinopril.    -restart RAAS blockade with lisinopril at a low dose of 5 mg daily with goal of slowing progeession of CKD and minimizing albuminuria (albumin to Cr ratio ~563 mg/g today).   -continue empagliflozin but at a lower dose of 10 mg daiy with the goal of minimizing albuminuria, slowing CKD progression, and minimizing cardiovascular events given his past CABG and ischemic HFrEF (45-50% by TTE In )  -will consider increasing lisinopril next if albuminuria worsens      -RTC in 6 months     2. HTN/Volume status:  Euvolemic to slightly hypervolemic on exam. He has not been measuring his home BP recently.  Clinic BP above goal today of at least <130/80.    -he will continue metoprolol XL at 12.5 mg and restart lisinopril 5 mg daily (RAAS blockade both for cardio and renal protection)  -continue empagliflozin which likely has led to better blood pressure control as well  -he will measure his BP daily and report to clinic for further adjustments.       3.  Electrolytes:  Potassium on higher side in past.  Suspect multifactorial in nature and due to CKD with underlying type 4 RTA physiology, hyperglycemia and past use of tacrolimus. It did improve after discontinuation of tacrolimus.  -refered to a dietician in past for more education regarding a low potassium diet.   No specific intervention needed at this time.   -as mentioned, we will restart lisinopril today and recheck potassium in 1 week        4. Anemia: Possibly related to Imuran in the past, which has been discontinued.  Iron stores are replete.  Anemia of chronic disease and renal insufficiency also likely contributing.  He did IVELISSE treatment and hemoglobin improved to 13.9 in the past.  Hemoglobin dropped again requiring restarting IVELISSE therapy (and a blood transfusion).  Fortunately, hemoglobin now stable in the 12's.        -he will continue to follow in the anemia management clinic     5.  Fatigue:  Suspect related to multiple comorbidities and deconditioned state.  We did check a TSH that was within normal limits.     -monitor for now    Follow-up: RTC in 6 months     Reason For Visit:   CKD    HPI:   Frandy Workman is a 57 year old man who presents for CKD f/u.  His past medical history is remarkable for liver transplant (November, 2019) for ESTRADA cirrhosis (complicated by ascites and hepatic encephalopathy on lactulose and rifaximin in the past), hepatocellular carcinoma (s/p TACE and microwave ablation 01/2019), long standing DM 2 (complicated by nonproliferative  diabetic retinopathy, macular edema and peripheral neuropathy), bipolar disorder (previously on lithium for 15 years), HTN, hyperlipidemia and CAD by angiography not requiring PCI.      He denies excessive use of NSAIDs in the past.  He had no history of nephrolithiasis or frequent UTIs.  He has no significant family history of CKD.     In terms of CKD, he appeared to have a baseline of ~1.1 mg/dL prior to his liver transplant in November 2019.  Creatinine after transplant was ~1.3 mg/dL at time of discharge and vikram to 3.2 mg/dL thought to be prerenal from volume depletion.  He was admitted for IVF and creatinine improved to 1.5 mg/dL at time of discharge.  His serum Cr now seems to fluctuate around 1.8 mg/dL with an eGFR of 40 ml/min.  He continues on tacrolimus for his liver transplant.  He no longer is on lasix for management of edema.  He has not required IVELISSE therapy in several months (last August, 2020).  He recently was started on low dose carvedilol for management of hypertension.  He also recently started empagliflozin for management of diabetes. He reports weight gain due to lack of exercise during the COVID pandemic.  His main complaint today is that of pain near his incisional scars for his liver transplant for which he is following up with hepatology.  His BP this morning was 129/84.      Interval History:  Overall, Mr. Workman feels well.  In July, 2021 he had unstble angina in the setting of severe anemia and eventually was noted to have a NSTEMI and underwent a CABG.  He was started on lisinopril and metoprolol but lisinopril was discontinued due to hypotension. His empagliflozin was discontinued during his recent hospitalization. Fortunately, we have been able to restart both lisinopril and empagliflozin. Though he reports today that he is not taking lisinopril. He underwent cardiac rehab and has recovered well.  Anemia has been an intermittent issue requiring blood transfusions and IVELISSE therapy.   Fortunately, his hemoglobn has now been stable in the 12's.  His only complaint today continues to be that of fatigue.  Recent issues have been bilateral cateract surgery and Mohs surgery for SCC of the scalp.  He also was recently started on metformin and continues on empagliflozin.  He otherwise, has no major medical complaints.        Review of Systems:   Pertinent items are noted in HPI or as below, remainder of complete ROS is negative.      Active Medications:   Current Outpatient Medications   Medication    Alcohol Swabs (ALCOHOL PREP) 70 % PADS    ammonium lactate (AMLACTIN) 12 % external cream    aspirin (SM ASPIRIN ADULT LOW STRENGTH) 81 MG EC tablet    BD VIKTORIA U/F 32G X 4 MM insulin pen needle    benzoyl peroxide 5 % external liquid    blood glucose (NO BRAND SPECIFIED) lancets standard    Continuous Blood Gluc Sensor (FREESTYLE CLAUDIA 2 SENSOR) MISC    insulin aspart (NOVOLOG PEN) 100 UNIT/ML pen    insulin degludec (TRESIBA FLEXTOUCH) 100 UNIT/ML pen    ketoconazole (NIZORAL) 2 % external shampoo    lamiVUDine (EPIVIR) 100 MG tablet    metoprolol succinate ER (TOPROL XL) 25 MG 24 hr tablet    Multiple Vitamin (TAB-A-GLADIS) TABS    mycophenolate (GENERIC EQUIVALENT) 250 MG capsule    ondansetron (ZOFRAN ODT) 8 MG ODT tab    pantoprazole (PROTONIX) 40 MG EC tablet    predniSONE (DELTASONE) 5 MG tablet    rosuvastatin (CRESTOR) 20 MG tablet    SORAfenib (NEXAVAR) 200 MG tablet    tamsulosin (FLOMAX) 0.4 MG capsule    Vitamin D3 50 mcg (2000 units) tablet    blood glucose (NO BRAND SPECIFIED) test strip    blood glucose monitoring (NO BRAND SPECIFIED) meter device kit     Current Facility-Administered Medications   Medication    aflibercept (EYLEA) injection prefilled syringe 2 mg    aflibercept (EYLEA) injection prefilled syringe 2 mg    dexamethasone (OZURDEX) intravitreal implant 0.7 mg    dexamethasone (OZURDEX) intravitreal implant 0.7 mg    faricimab-svoa (VABYSMO) intravitreal injection 6 mg     faricimab-svoa ([facility]) intravitreal injection 6 mg    lidocaine (PF) (XYLOCAINE) injection 1 mL        Allergies:   Codeine and Seasonal allergies      Past Medical History:  Chronic kidney disease, stage 3  Type 2 diabetes mellitus  Bipolar affective disorder   Brow ptosis  Hepatocellular carcinoma  Coronary artery disease s/p CABG  Hypertension   Anemia   Anxiety   Mild recurrent major depression  Mild nonproliferative retinopathy  Gastroesophageal reflux disease   Cholelithiasis   Benign prostatic hypertrophy   Portal vein thrombosis      Past Surgical History:  Liver transplant recipient   Coronary catheterization  Parotidectomy, radical neck dissection  Right eyelid weight placement  Orthopedic surgery    Family History:   Prostate cancer - maternal grandfather, father   Substance abuse - maternal grandfather, maternal grandmother   Colon cancer - father, paternal grandmother  Cancer - mother  Thyroid disease - mother, sister   Stroke - mother  Depression - mother, sister  Asthma - sister      Social History:   Presents to clinic alone  Tobacco Use: Former smoker, 180 pack year history, quit 2017.   Alcohol Use: quit September 1996  PCP: Lamin Ortiz      Physical Exam:  BP (!) 143/82   Pulse 99   Temp 98.6  F (37  C) (Oral)   Wt 81.6 kg (179 lb 12.8 oz)   SpO2 99%   BMI 26.55 kg/m     GA: NAD  HEENT: no scleral icterus, no cervical LAD  CV: regular, no rub, no JVD, no edema  PULM: Lungs CTA B  GI: abd soft, NT, ND  : no CVA tenderness  MSK: no joint effusions, trace LE edema  SKIN: no concerning rash  NEURO: no gross focal deficit     Laboratory:  CMP  Recent Labs   Lab Test 09/07/23  1014 08/24/23  0934 08/18/23  1146 08/16/23  1200 08/16/23  0748 08/16/23  0601 08/15/23  0853 08/15/23  0505 08/10/23  2159 08/10/23  2151 08/10/23  1844 08/10/23  1833 08/10/23  1024 07/28/23  1046 07/12/21  1036 06/28/21  1347 06/14/21  1322 06/04/21  1236 05/05/21  0909    139 140  --   --  138  --  141    < > 141   < > 138 139 130*   < > 137 139 138 139   POTASSIUM 4.5 4.6 4.6  --   --  4.6   < > 4.3   < > 5.9*   < > 8.0* 6.3* 5.5*   < > 4.6 4.7 4.6 4.5   CHLORIDE 103 106 106  --   --  107  --  106   < > 116*  --  112* 112* 103   < > 107 106 106 107   CO2 25 20* 19*  --   --  17*  --  20*   < > 9*  --  8* 9* 17*   < > 25 26 26 26   ANIONGAP 10 13 15  --   --  14  --  15   < > 16*  --  18* 18* 10   < > 5 7 6 6   * 212* 129* 185*   < > 204*   < > 141*   < > 113*   < > 130* 180* 60*   < > 208* 173* 156* 258*   BUN 33.5* 35.2* 61.5*  --   --  45.1*  --  43.5*   < > 105.0*  --  117.0* 107.0* 56.8*   < > 33* 29 39* 38*   CR 1.50* 1.71* 2.84*  --   --  1.84*  --  1.87*   < > 6.77*  --  7.38* 6.50* 2.46*   < > 1.62* 1.54* 1.64* 1.52*   GFRESTIMATED 53* 46* 25*  --   --  42*  --  41*   < > 9*  --  8* 9* 29*   < > 46* 49* 46* 50*   GFRESTBLACK  --   --   --   --   --   --   --   --   --   --   --   --   --   --   --  54* 57* 53* 58*   DEBORAH 10.0 9.3 9.3  --   --  8.6  --  8.7   < > 7.9*  --  8.6 9.1 9.1   < > 9.1 9.8 10.2* 9.6   MAG 2.0 1.6*  --   --   --  2.0  --  1.9   < > 2.1  --  2.3 2.3 2.2   < >  --   --  2.2  --    PHOS 2.7 2.2*  --   --   --   --   --   --   --   --   --   --  6.0* 3.4   < >  --   --   --   --    PROTTOTAL 7.5 6.8  --   --   --   --   --   --   --  6.0*  --  7.3 7.6 7.3   < > 7.4 7.8 7.8 7.8   ALBUMIN 4.9 4.4  --   --   --   --   --   --   --  3.4*  --  4.3 4.4 4.7   < > 4.0 4.1 4.2 4.2   BILITOTAL 0.5 0.2  --   --   --   --   --   --   --  0.2  --  0.3 0.4 0.4   < > 0.5 0.6 0.5 0.5   ALKPHOS 86 77  --   --   --   --   --   --   --  84  --  108 110 93   < > 98 106 108 112   AST 20 18  --   --   --   --   --   --   --  25  --  22 24 23   < > 9 8 10 13   ALT 19 20  --   --   --   --   --   --   --  16  --  26 27 25   < > 20 21 26 28    < > = values in this interval not displayed.     CBC  Recent Labs   Lab Test 09/07/23  1014 08/24/23  0934 08/18/23  1146 08/16/23  0601   HGB 11.5* 10.2* 11.0* 10.3*    WBC 4.9 4.7 6.7 4.0   RBC 3.67* 3.24* 3.46* 3.30*   HCT 35.8* 31.5* 33.6* 31.4*   MCV 98 97 97 95   MCH 31.3 31.5 31.8 31.2   MCHC 32.1 32.4 32.7 32.8   RDW 15.4* 13.3 13.1 12.9    151 129* 97*     INR  Recent Labs   Lab Test 08/10/23  1833 01/11/22  2200 07/14/21  1351 07/14/21  1215 11/12/19  1645 11/12/19  1400 11/12/19  0950 11/12/19  0346   INR 1.16* 1.13 1.28* 1.45*   < > 1.46*   < > 1.47*   PTT  --   --  46* 37  --  39*  --  57*    < > = values in this interval not displayed.     ABG  Recent Labs   Lab Test 08/11/23  0852 08/11/23  0209 08/11/23  0143 08/10/23  2152 01/10/22  2314 07/14/21  2049 07/14/21  1605 07/14/21  1352 07/14/21  1351 07/14/21  1252 07/14/21  1216   PH  --   --   --   --  7.37  --  7.37  --  7.29* 7.36 7.38   PCO2  --   --   --   --   --   --  37  --  44 35 36   PO2  --   --   --   --   --   --  87  --  145* 181* 306*   HCO3  --   --   --   --   --   --  21  --  21 20* 21   O2PER 22 21 21 0  --    < > 21   < > 5 50.0 75.0    < > = values in this interval not displayed.      URINE STUDIES  Recent Labs   Lab Test 08/11/23  0043 03/15/23  0915 01/11/22  0001 09/07/21  1115   COLOR Light Yellow Straw Light Yellow Yellow   APPEARANCE Clear Clear Clear Cloudy*   URINEGLC 300* >=1000* >=1000* 50*   URINEBILI Negative Negative Negative Negative   URINEKETONE Negative Negative Negative Negative   SG 1.009 1.023 1.018 1.022   UBLD Small* Trace* Negative Small*   URINEPH 5.0 5.5 5.0 5.0   PROTEIN 30* 50* Negative >499*   NITRITE Negative Negative Negative Negative   LEUKEST Negative Negative Negative Negative   RBCU 0 1 <1 1   WBCU 3 <1 1 1     Recent Labs   Lab Test 04/20/22  0919 10/04/21  0804 09/07/21  1115 02/26/21  0750 06/29/20  1008 03/02/20  1013 12/02/19  1040 07/08/19  1017 02/04/19  0705   UTPG 0.12 1.46* 1.17* 0.47* 0.89* 0.29* 1.22* 0.11 0.14     PTH  Recent Labs   Lab Test 03/15/23  0906 04/20/22  0912 10/04/21  0802 06/29/20  1005 07/08/19  1020   PTHI 78* 59 81* 61 54      IRON STUDIES   Recent Labs   Lab Test 04/25/22  0925 02/09/22  0850 11/16/21  1004 11/02/21  1031 07/12/21  1608 06/28/21  1347 06/04/21  1236 12/02/20  0739 11/11/20  0754 10/14/20  0836 09/16/20  0802 07/29/20  0749 06/29/20  1005 05/21/20  0723 04/22/20  0833 03/09/20  0823 01/27/20  1340 12/02/19  1034 12/28/18  1108 09/15/18  1215 06/09/17  1250   IRON 119 96 93 109  --  112 158 75 81 73 93 87 60 72 65 92  --  88  --  52  --     248 211* 241  --  226* 270 228* 227* 248 221* 230* 204* 192* 215* 231*  --  248  --  403  --    IRONSAT 42 39 44 45  --  50* 59* 33 36 29 42 38 29 38 30 40  --  36  --  13*  --    QUAN 508* 1,046* 400* 479* 543* 495* 399* 433* 286 323 223 193 352 344 643* 859* 690* 1,353* 90 10* 450*       All above labs reviewed by me.   Eloy Rao MD   [phone]

## 2023-09-15 NOTE — NURSING NOTE
Chief Complaint   Patient presents with    RECHECK     6 mo f/u       BP (!) 143/82   Pulse 99   Temp 98.6  F (37  C) (Oral)   Wt 81.6 kg (179 lb 12.8 oz)   SpO2 99%   BMI 26.55 kg/m      Ean Soares on 9/15/2023 at 1:20 PM

## 2023-09-18 ENCOUNTER — ALLIED HEALTH/NURSE VISIT (OUTPATIENT)
Dept: OTHER | Facility: CLINIC | Age: 58
End: 2023-09-18
Payer: MEDICARE

## 2023-09-18 VITALS
TEMPERATURE: 97.6 F | DIASTOLIC BLOOD PRESSURE: 70 MMHG | SYSTOLIC BLOOD PRESSURE: 124 MMHG | OXYGEN SATURATION: 99 % | HEART RATE: 95 BPM

## 2023-09-18 DIAGNOSIS — I10 BENIGN ESSENTIAL HYPERTENSION: Primary | ICD-10-CM

## 2023-09-18 PROCEDURE — 99207 PR COMMUNITY PARAMEDIC - PATIENT NOT BILLABLE: CPT

## 2023-09-18 NOTE — PROGRESS NOTES
[facility] Follow-up Visit  September 18, 2023  TIME: 1230    Frandy Workman is a 58 year old male being seen at home for a follow-up visit.    Present at appointment:           Chief Complaint   Patient presents with    Recheck Medication       Universal Utilization:      Utilization      Hospital Admissions  2             ED Visits  1             No Show Count (past year)  0                    Current as of: 9/18/2023 12:57 PM                /70   Pulse 95   Temp 97.6  F (36.4  C)   SpO2 99%     Clinical Concerns:  Current Medical Concerns:  Diabetes    Current Behavioral Concerns: no    Education Provided to patient: no   Medication set up? Yes  Pill Box issued: No  Scale issued: No  Flu Shot given: No  COVID Vaccine Given: No  Lab draw or specimen collection: No  Food resource given: No  Collaborative visit with PCP: No  Wound Care: No  Health Maintenance Addressed Yes    Clinical Pathway: None    No  Face to Face in Home / Community    Recent blood sugars: 214    Review of Symptoms/PE    Skin: negative  Eyes: negative  Ears/Nose/Throat: negative  Respiratory: No shortness of breath, dyspnea on exertion, cough, or hemoptysis  Cardiovascular: negative  Gastrointestinal: negative  Genitourinary: negative  Musculoskeletal: negative  Neurologic: negative  Psychiatric: negative    Pain Management::                 Plan:     Time spent with patient: 45    The patient meets one or more of the following criteria:  * Requires services to prevent readmission to a nursing home or hospital      Next CP visit scheduled: 9/26/23    Issues for Provider to follow up on: Miller took all of his medications as directed.  His medications were set up for the next week.    Provider follow up visit needed: multiple upcoming

## 2023-09-19 ENCOUNTER — LAB (OUTPATIENT)
Dept: LAB | Facility: CLINIC | Age: 58
End: 2023-09-19
Payer: MEDICARE

## 2023-09-19 ENCOUNTER — ANCILLARY PROCEDURE (OUTPATIENT)
Dept: CT IMAGING | Facility: CLINIC | Age: 58
End: 2023-09-19
Attending: INTERNAL MEDICINE
Payer: MEDICARE

## 2023-09-19 ENCOUNTER — NURSE TRIAGE (OUTPATIENT)
Dept: NURSING | Facility: CLINIC | Age: 58
End: 2023-09-19

## 2023-09-19 ENCOUNTER — TELEPHONE (OUTPATIENT)
Dept: ENDOCRINOLOGY | Facility: CLINIC | Age: 58
End: 2023-09-19

## 2023-09-19 DIAGNOSIS — Z94.4 STATUS POST LIVER TRANSPLANTATION (H): ICD-10-CM

## 2023-09-19 DIAGNOSIS — C22.0 HCC (HEPATOCELLULAR CARCINOMA) (H): ICD-10-CM

## 2023-09-19 DIAGNOSIS — D84.9 IMMUNOSUPPRESSED STATUS (H): ICD-10-CM

## 2023-09-19 DIAGNOSIS — Z79.899 ENCOUNTER FOR LONG-TERM (CURRENT) USE OF MEDICATIONS: ICD-10-CM

## 2023-09-19 DIAGNOSIS — C78.6 MALIGNANT NEOPLASM METASTATIC TO PERITONEUM (H): ICD-10-CM

## 2023-09-19 LAB
ALBUMIN SERPL BCG-MCNC: 4.6 G/DL (ref 3.5–5.2)
ALP SERPL-CCNC: 100 U/L (ref 40–129)
ALT SERPL W P-5'-P-CCNC: 18 U/L (ref 0–70)
ANION GAP SERPL CALCULATED.3IONS-SCNC: 12 MMOL/L (ref 7–15)
AST SERPL W P-5'-P-CCNC: 19 U/L (ref 0–45)
BASOPHILS # BLD AUTO: 0 10E3/UL (ref 0–0.2)
BASOPHILS NFR BLD AUTO: 0 %
BILIRUB SERPL-MCNC: 0.6 MG/DL
BUN SERPL-MCNC: 24.1 MG/DL (ref 8–23)
CALCIUM SERPL-MCNC: 9.7 MG/DL (ref 8.6–10)
CHLORIDE SERPL-SCNC: 102 MMOL/L (ref 98–107)
CREAT SERPL-MCNC: 1.35 MG/DL (ref 0.67–1.17)
DEPRECATED HCO3 PLAS-SCNC: 24 MMOL/L (ref 22–29)
EGFRCR SERPLBLD CKD-EPI 2021: 60 ML/MIN/1.73M2
EOSINOPHIL # BLD AUTO: 0 10E3/UL (ref 0–0.7)
EOSINOPHIL NFR BLD AUTO: 0 %
ERYTHROCYTE [DISTWIDTH] IN BLOOD BY AUTOMATED COUNT: 15.6 % (ref 10–15)
GLUCOSE SERPL-MCNC: 143 MG/DL (ref 70–99)
HCT VFR BLD AUTO: 35.1 % (ref 40–53)
HGB BLD-MCNC: 10.9 G/DL (ref 13.3–17.7)
IMM GRANULOCYTES # BLD: 0 10E3/UL
IMM GRANULOCYTES NFR BLD: 1 %
LYMPHOCYTES # BLD AUTO: 0.6 10E3/UL (ref 0.8–5.3)
LYMPHOCYTES NFR BLD AUTO: 12 %
MAGNESIUM SERPL-MCNC: 2.1 MG/DL (ref 1.7–2.3)
MCH RBC QN AUTO: 31.1 PG (ref 26.5–33)
MCHC RBC AUTO-ENTMCNC: 31.1 G/DL (ref 31.5–36.5)
MCV RBC AUTO: 100 FL (ref 78–100)
MONOCYTES # BLD AUTO: 0.3 10E3/UL (ref 0–1.3)
MONOCYTES NFR BLD AUTO: 7 %
NEUTROPHILS # BLD AUTO: 3.7 10E3/UL (ref 1.6–8.3)
NEUTROPHILS NFR BLD AUTO: 80 %
NRBC # BLD AUTO: 0 10E3/UL
NRBC BLD AUTO-RTO: 0 /100
PHOSPHATE SERPL-MCNC: 3 MG/DL (ref 2.5–4.5)
PLATELET # BLD AUTO: 199 10E3/UL (ref 150–450)
POTASSIUM SERPL-SCNC: 4.9 MMOL/L (ref 3.4–5.3)
PROT SERPL-MCNC: 7.2 G/DL (ref 6.4–8.3)
RBC # BLD AUTO: 3.5 10E6/UL (ref 4.4–5.9)
SODIUM SERPL-SCNC: 138 MMOL/L (ref 136–145)
WBC # BLD AUTO: 4.7 10E3/UL (ref 4–11)

## 2023-09-19 PROCEDURE — 85025 COMPLETE CBC W/AUTO DIFF WBC: CPT | Performed by: PATHOLOGY

## 2023-09-19 PROCEDURE — 84100 ASSAY OF PHOSPHORUS: CPT | Performed by: PATHOLOGY

## 2023-09-19 PROCEDURE — 80053 COMPREHEN METABOLIC PANEL: CPT | Performed by: PATHOLOGY

## 2023-09-19 PROCEDURE — 36415 COLL VENOUS BLD VENIPUNCTURE: CPT | Performed by: PATHOLOGY

## 2023-09-19 PROCEDURE — 71260 CT THORAX DX C+: CPT | Mod: MG | Performed by: RADIOLOGY

## 2023-09-19 PROCEDURE — 74178 CT ABD&PLV WO CNTR FLWD CNTR: CPT | Mod: MG | Performed by: RADIOLOGY

## 2023-09-19 PROCEDURE — G1010 CDSM STANSON: HCPCS | Performed by: RADIOLOGY

## 2023-09-19 PROCEDURE — 83735 ASSAY OF MAGNESIUM: CPT | Performed by: PATHOLOGY

## 2023-09-19 RX ORDER — IOPAMIDOL 755 MG/ML
89 INJECTION, SOLUTION INTRAVASCULAR ONCE
Status: COMPLETED | OUTPATIENT
Start: 2023-09-19 | End: 2023-09-19

## 2023-09-19 RX ADMIN — IOPAMIDOL 89 ML: 755 INJECTION, SOLUTION INTRAVASCULAR at 09:46

## 2023-09-19 NOTE — TELEPHONE ENCOUNTER
"Additional Information    Negative: [1] Caller is not with the adult (patient) AND [2] reporting urgent symptoms    Negative: Lab result questions    Negative: Caller can't be reached by phone    Negative: Caller has already spoken to PCP or another triager    Negative: Medication questions    RN needs further essential information from caller in order to complete triage    Answer Assessment - Initial Assessment Questions  1. REASON FOR CALL or QUESTION: \"What is your reason for calling today?\" or \"How can I best help you?\" or \"What question do you have that I can help answer?\"      Blood sugars have been wildly fluctuating today - was told to Call dawson Vasquez when this happens     4:18 AM  252  8:56 163  10:52  158  1201  241 took 7 units of insulin  1:18 255  3:32 68 going down  3:51 63    These reading are all on Benjamin 2 (running about 10 lpow 0 on meter is 74 at 4:20.  Call transferred to Endocrine who then sent the call to ELVIS Vasquez for advice.      Kathleen M Doege RN    Protocols used: Information Only Call-A-AH    "

## 2023-09-19 NOTE — DISCHARGE INSTRUCTIONS

## 2023-09-19 NOTE — TELEPHONE ENCOUNTER
Miller calls with lows  but it's difficult to understand what he's doing. If you could check his camilo. He tells me he takes the  novolog 1/2 hour after eating  based on carb counting. Today at lunch you did 6 units when he usually does 10 and BS went into 60's . For Tresiba he is doing 30 units every 3 days last dose last night . Please view camilo and contact him . He's fluctuating big time and not doing as ordered. Brisa Woods RN on 9/19/2023 at 4:46 PM

## 2023-09-20 DIAGNOSIS — E11.3293 TYPE 2 DIABETES MELLITUS WITH MILD NONPROLIFERATIVE RETINOPATHY OF BOTH EYES WITHOUT MACULAR EDEMA, UNSPECIFIED WHETHER LONG TERM INSULIN USE (H): ICD-10-CM

## 2023-09-20 RX ORDER — INSULIN DEGLUDEC 100 U/ML
INJECTION, SOLUTION SUBCUTANEOUS
Qty: 45 ML | Refills: 3 | Status: SHIPPED | OUTPATIENT
Start: 2023-09-20

## 2023-09-20 ASSESSMENT — PATIENT HEALTH QUESTIONNAIRE - PHQ9
10. IF YOU CHECKED OFF ANY PROBLEMS, HOW DIFFICULT HAVE THESE PROBLEMS MADE IT FOR YOU TO DO YOUR WORK, TAKE CARE OF THINGS AT HOME, OR GET ALONG WITH OTHER PEOPLE: SOMEWHAT DIFFICULT
SUM OF ALL RESPONSES TO PHQ QUESTIONS 1-9: 5
SUM OF ALL RESPONSES TO PHQ QUESTIONS 1-9: 5

## 2023-09-20 NOTE — TELEPHONE ENCOUNTER
I spoke with Miller who read the first My chart and understands and then I gave him the Tresiba 15 units every day  to which he also agrees. He did 30 units last night . He will let us know  if blood sugars are below 70 or above 250 once he follows the new orders. Brisa Woods RN on 9/20/2023 at 10:41 AM

## 2023-09-21 ENCOUNTER — VIRTUAL VISIT (OUTPATIENT)
Dept: BEHAVIORAL HEALTH | Facility: CLINIC | Age: 58
End: 2023-09-21
Payer: MEDICARE

## 2023-09-21 ENCOUNTER — VIRTUAL VISIT (OUTPATIENT)
Dept: ONCOLOGY | Facility: CLINIC | Age: 58
End: 2023-09-21
Attending: INTERNAL MEDICINE
Payer: MEDICARE

## 2023-09-21 VITALS — DIASTOLIC BLOOD PRESSURE: 75 MMHG | SYSTOLIC BLOOD PRESSURE: 155 MMHG | HEART RATE: 107 BPM

## 2023-09-21 DIAGNOSIS — C22.0 HCC (HEPATOCELLULAR CARCINOMA) (H): Primary | ICD-10-CM

## 2023-09-21 DIAGNOSIS — N18.31 CHRONIC KIDNEY DISEASE, STAGE 3A (H): ICD-10-CM

## 2023-09-21 DIAGNOSIS — F31.31 BIPOLAR 1 DISORDER, DEPRESSED, MILD (H): Primary | ICD-10-CM

## 2023-09-21 DIAGNOSIS — Z94.4 LIVER TRANSPLANT RECIPIENT (H): ICD-10-CM

## 2023-09-21 DIAGNOSIS — F31.0 BIPOLAR AFFECTIVE DISORDER, CURRENT EPISODE HYPOMANIC (H): ICD-10-CM

## 2023-09-21 DIAGNOSIS — F41.1 GENERALIZED ANXIETY DISORDER: ICD-10-CM

## 2023-09-21 DIAGNOSIS — C78.6 MALIGNANT NEOPLASM METASTATIC TO PERITONEUM (H): ICD-10-CM

## 2023-09-21 PROCEDURE — 99417 PROLNG OP E/M EACH 15 MIN: CPT | Performed by: INTERNAL MEDICINE

## 2023-09-21 PROCEDURE — 90834 PSYTX W PT 45 MINUTES: CPT | Mod: 95 | Performed by: PSYCHOLOGIST

## 2023-09-21 PROCEDURE — 99215 OFFICE O/P EST HI 40 MIN: CPT | Mod: 95 | Performed by: INTERNAL MEDICINE

## 2023-09-21 ASSESSMENT — PAIN SCALES - GENERAL: PAINLEVEL: EXTREME PAIN (8)

## 2023-09-21 NOTE — NURSING NOTE
Miller also made mention of getting labs and scans added to his my chart account  in a faster manner to his my chart . I said I would let you know. Andra James

## 2023-09-21 NOTE — PROGRESS NOTES
[facility]: Integrated Behavioral Health  September 21, 2023          Behavioral Health Clinician Progress Note    Patient Name: Frandy Workman           Service Type: Video visit      Service Location:  Video visit      Session Start Time: 2:02  Session End Time: 2:51      Session Length: 38 - 52      Attendees: Patient    Visit Activities (Refresh list every visit): [facility] Only     Service Modality:  Video Visit:      Provider verified identity through the following two step process.  Patient provided:  Patient photo and Patient is known previously to provider    Telemedicine Visit: The patient's condition can be safely assessed and treated via synchronous audio and visual telemedicine encounter.      Reason for Telemedicine Visit: Services only offered telehealth    Originating Site (Patient Location): Patient's home    Distant Site (Provider Location): Provider Remote Setting- Home Office    Consent:  The patient/guardian has verbally consented to: the potential risks and benefits of telemedicine (video visit) versus in person care; bill my insurance or make self-payment for services provided; and responsibility for payment of non-covered services.     Patient would like the video invitation sent by:  My Chart    Mode of Communication:  Video Conference via Amwell    Distant Location (Provider):  Off-site    As the provider I attest to compliance with applicable laws and regulations related to telemedicine.      Diagnostic Assessment Date:  12/03/2020  Treatment Plan Review Date:  11/4/2023  See Flowsheets for today's PHQ-9 and LIZZETTE-7 results  Previous PHQ-9:       6/28/2023     4:07 PM 8/3/2023     9:13 AM 9/20/2023     2:45 PM   PHQ-9 SCORE   PHQ-9 Total Score MyChart 3 (Minimal depression) 3 (Minimal depression) 5 (Mild depression)   PHQ-9 Total Score 3 3 5     Previous LIZZETTE-7:       4/7/2021    12:34 PM 9/30/2021     1:45 PM 5/17/2023     3:37 PM   LIZZETTE-7 SCORE   Total Score 9  "(mild anxiety)     Total Score 9 10 6       Extended Session (60+ minutes): No  Interactive Complexity: No  Crisis: No    Treatment Objective(s) Addressed in This Session:  Target Behavior(s): disease management/lifestyle changes   related to adaptive approaches to managing anxious distress and mood difficulties    Depressed mood: Increase interest, pleasure in doing things. Anger management strategies.  Improve sleep hygiene       Current Stressors / Issues:  [facility] met with Miller today for a follow-up, to help Miller continue to feel supported in his health behavior change and overall mental health.      Miller states today his main priority is to wok on sleep hygiene. He states that medication he takes for oncology treatment impacts his sleep, though we agreed to look into sleep hygiene strategies he could use today. We identified the following recommendations for him:    Go to bed and wake at around the same time - we agreed to try winding-down at 10:00 with the goal of low level sleep by 11:00 pm  Avoid screen use during wind-down time. This means no screens from 10:00 to 11:00 pm. Discussed how sports or other television creates a \"oneida-\" effect that can keep people awake. Miller also identified shutting off his phone to avoid going on social media or answering international calls.   Lower temperature of the home to 68-70 degrees instead of 75 degrees (current temp at his home).  Darken the sleeping space by closing blinds/curtains. Darker the space, the better.   Replace screen use with behaviors that elicit relaxation, or sleepiness such as reading or practicing relaxation.             Progress on Treatment Objective(s) / Homework:  Stable - ACTION (Actively working towards change); Intervened by reinforcing change plan / affirming steps taken      Solution-Focused Therapy  Explore patterns in patient's relationships and discuss options for new behaviors.  Explore patterns in patient's actions and choices and " discuss options for new behaviors.    Behavioral Activation  Discuss steps patient can take to become more involved in meaningful activity.  Identify barriers to these activities and explore possible solutions.      Answers for HPI/ROS submitted by the patient on 3/21/2022  If you checked off any problems, how difficult have these problems made it for you to do your work, take care of things at home, or get along with other people?: Not difficult at all  PHQ9 TOTAL SCORE: 6      Answers for HPI/ROS submitted by the patient on 2/8/2022  If you checked off any problems, how difficult have these problems made it for you to do your work, take care of things at home, or get along with other people?: Somewhat difficult  PHQ9 TOTAL SCORE: 4      Answers for HPI/ROS submitted by the patient on 4/7/2021   LZIZETTE 7 TOTAL SCORE: 9  If you checked off any problems, how difficult have these problems made it for you to do your work, take care of things at home, or get along with other people?: Very difficult  PHQ9 TOTAL SCORE: 7*    *No SI    Answers for HPI/ROS submitted by the patient on 10/13/2020   If you checked off any problems, how difficult have these problems made it for you to do your work, take care of things at home, or get along with other people?: Somewhat difficult  PHQ9 TOTAL SCORE: 8*  LIZZETTE 7 TOTAL SCORE: 6      *No SI     Answers for HPI/ROS submitted by the patient on 12/29/2020   If you checked off any problems, how difficult have these problems made it for you to do your work, take care of things at home, or get along with other people?: Somewhat difficult  PHQ9 TOTAL SCORE: 5*  LIZZETTE 7 TOTAL SCORE: 8    *No SI     Answers for HPI/ROS submitted by the patient on 11/21/2022  If you checked off any problems, how difficult have these problems made it for you to do your work, take care of things at home, or get along with other people?: Very difficult  PHQ9 TOTAL SCORE: 4          Care Plan review completed:  no    Medication Review:  No changes to current psychiatric medication(s)     Reports discontinuing zolpidem.       Current Outpatient Medications   Medication    Alcohol Swabs (ALCOHOL PREP) 70 % PADS    ammonium lactate (AMLACTIN) 12 % external cream    aspirin (SM ASPIRIN ADULT LOW STRENGTH) 81 MG EC tablet    BD VIKTORIA U/F 32G X 4 MM insulin pen needle    benzoyl peroxide 5 % external liquid    blood glucose (NO BRAND SPECIFIED) lancets standard    Continuous Blood Gluc Sensor (FREESTYLE CLAUDIA 2 SENSOR) MISC    insulin aspart (NOVOLOG PEN) 100 UNIT/ML pen    insulin degludec (TRESIBA FLEXTOUCH) 100 UNIT/ML pen    ketoconazole (NIZORAL) 2 % external shampoo    lamiVUDine (EPIVIR) 100 MG tablet    metoprolol succinate ER (TOPROL XL) 25 MG 24 hr tablet    Multiple Vitamin (TAB-A-GLADIS) TABS    mycophenolate (GENERIC EQUIVALENT) 250 MG capsule    NIFEdipine ER OSMOTIC (PROCARDIA XL) 60 MG 24 hr tablet    ondansetron (ZOFRAN ODT) 8 MG ODT tab    pantoprazole (PROTONIX) 40 MG EC tablet    predniSONE (DELTASONE) 5 MG tablet    rosuvastatin (CRESTOR) 20 MG tablet    SORAfenib (NEXAVAR) 200 MG tablet    tamsulosin (FLOMAX) 0.4 MG capsule    Vitamin D3 50 mcg (2000 units) tablet     Current Facility-Administered Medications   Medication    aflibercept (EYLEA) injection prefilled syringe 2 mg    aflibercept (EYLEA) injection prefilled syringe 2 mg    dexamethasone (OZURDEX) intravitreal implant 0.7 mg    dexamethasone (OZURDEX) intravitreal implant 0.7 mg    faricimab-svoa (VABYSMO) intravitreal injection 6 mg    faricimab-svoa (VABYSMO) intravitreal injection 6 mg    lidocaine (PF) (XYLOCAINE) injection 1 mL       Medication Compliance:  Yes    Changes in Health Issues:   None reported    Chemical Use Review:   Substance Use: Chemical use reviewed, no active concerns identified      Tobacco Use: No current tobacco use.         Assessment: Current Emotional / Mental Status (status of significant symptoms):  Risk status (Self  / Other harm or suicidal ideation)  Patient denies a history of suicidal ideation, suicide attempts, self-injurious behavior, homicidal ideation, homicidal behavior and and other safety concerns     Patient denies current fears or concerns for personal safety.  Patient denies current or recent suicidal ideation or behaviors.  Patient denies current or recent homicidal ideation or behaviors.  Patient denies current or recent self injurious behavior or ideation.  Patient denies other safety concerns.     A safety and risk management plan has not been developed at this time, however patient was encouraged to call [place] [phone] should there be a change in any of these risk factors.    [place] Suicide Severity Rating Scale (Short Version)      7/1/2020    11:00 AM 7/12/2021     2:30 PM 1/10/2022     9:28 PM 1/3/2023     6:31 AM 1/24/2023     6:22 AM 7/13/2023    10:31 AM 8/10/2023     3:31 PM   [place] Suicide Severity Rating (Short Version)   Over the past 2 weeks have you felt down, depressed, or hopeless? no no no no no no no   Over the past 2 weeks have you had thoughts of killing yourself? no no no no  no no   Have you ever attempted to kill yourself? no no no no  no no   Q1 Wished to be Dead (Past Month)    no      Q2 Suicidal Thoughts (Past Month)    no      Q3 Suicidal Thought Method    no      Q4 Suicidal Intent without Specific Plan    no      Q5 Suicide Intent with Specific Plan    no      Q6 Suicide Behavior (Lifetime)    no      Level of Risk per Screen    low risk            Appearance:   Appropriate   Eye Contact:   Good   Psychomotor Behavior: TD evident   Attitude:   Cooperative  Interested Friendly Pleasant  Orientation:   All  Speech   Rate / Production: Normal/ Responsive   Volume:  Normal   Mood:    Depressed ; improving  Affect:    Appropriate    Thought Content:  Clear   Thought Form:  Goal Directed  Logical   Insight:    Good     Diagnoses:  1. Bipolar 1 disorder, depressed, mild (H)    2.  Generalized anxiety disorder              Collateral Reports Completed:  Not Applicable    Plan: (Homework, other):  Continue with this writer for individual psychotherapy in 2/3 wks. He will continue to work on managing depression and anxiety through achievable behavioral activation ideas. Information about sleep hygiene provided. Handout given in AVS. .  No CD issues.     Joseph Murillo Psy.D, RED   Behavioral Health Clinician   [facility]              ______________________________________________________________________                                            Individual Treatment Plan    Patient's Name: Frandy Workman  YOB: 1965    Date of Creation: 3/1/2022  Date Treatment Plan Last Reviewed/Revised: 8/4/2023    DSM5 Diagnoses: 296.51 Bipolar I Disorder Current or Most Recent Episode Depressed, Mild or 300.02 (F41.1) Generalized Anxiety Disorder  Psychosocial / Contextual Factors: Post Solid Organ Tx  PROMIS (reviewed every 90 days): 4    Referral / Collaboration:  Referral to another professional/service is not indicated at this time..    Anticipated number of session for this episode of care: 4-6  Anticipation frequency of session: Every other week  Anticipated Duration of each session: 38-52 minutes  Treatment plan will be reviewed in 90 days or when goals have been changed.       MeasurableTreatment Goal(s) related to diagnosis / functional impairment(s)  Goal 1: Patient will experience a reduction in depressive symptoms along with a corresponding increase in positive emotion and life satisfaction.      Objective #A (Patient Action)    Patient will Increase interest, engagement, and pleasure in doing things.  Status: Continued - Date(s): 8/4/2023    Intervention(s)  Therapist will help patient identify pleasant and mastery oriented events that elicit positive, relaxed mood.    Objective #B  Patient will Decrease frequency and intensity of feeling down,  "depressed, hopeless.  Status: Continued - Date(s): 8/4/2023    Intervention(s)  Therapist will introduce patient to cognitive-behavioral and acceptance and commitment therapy topics aimed to help reduce depression and anxiety    Objective #C  Patient will Identify negative self-talk and behaviors: challenge core beliefs, myths, and actions  Improve concentration, focus, and mindfulness in daily activities .  Status: Continued - Date(s): COMPLETE    Intervention(s)  Therapist will help patient identify and manage negative self-talk and automatic thoughts; introduce patient to cognitive distortions; help patient develop cognitive diffusion techniques      Goal 2: Patient will experience a reduction in anxious symptoms, along with a corresponding increase in relaxed emotional states and life satisfaction.      Objective #A (Patient Action)  Patient will use cognitive-behavioral and thought diffusion strategies identified in therapy to challenge anxious thoughts.    Status: Continued - Date(s): COMPLETE    Intervention(s)  Therapist will utilize CBT and ACT ideas to help patient challenge anxious thoughts and reduce intensity/duration of anxious distress    Objective #B  Patient will use \"worry time\" each day for 15 minutes of scheduled worry and then defer obsessive or anxious thinking until the next structured worry time.    Status: Continued - Date(s): COMPLETE    Intervention(s)  Therapist will teach patient how to effectively utilize worry time and/or thought logs/journals each day and incorporate more relaxation behaviors into their routine.    Objective #C  Patient will identify the stressors which contribute to feelings of anxiety  Patient will increase engagement in adaptive coping skills and recreational activities, such as exercise and healthy socialization, to manage distress.  Status: Continued - Date(s): COMPLETE    Intervention(s)  Therapist will help patient identify triggers/situational factors that " contribute to anxiety and behavioral skills aimed to manage anxious distress.      Goal 3: Patiient will work toward adaptively managing bipolar-related depression and manic episodes      Objective #A (Patient Action)    Status: Continued - Date(s): COMPLETE    Patient will identify 2-3 signs or signals of emerging mood instability.    Intervention(s)  Therapist will provide educational materials on bipolar disorder and help identifying triggers for mood instability .    Objective #B  Patient will identify 2-3 strategies for more effectively managing Bipolar Disorder.    Status: Continued - Date(s): COMPLETE    Intervention(s)  Therapist will teach emotional recognition/identification. Skills aimed to effectively manage bipolar disorder .      Other Possible Therapeutic Intervention(s):    Psycho-education regarding mental health diagnoses and treatment options    Supportive Therapy  Provide affirmations, reflections, and establish working rapport  Emphasize and reflect on strength of therapeutic relationship    Skills training  Explore skills useful to client in current situation.  Skills include assertiveness, communication, conflict management, problem-solving, relaxation, etc.    Solution-Focused Therapy  Explore patterns in patient's relationships and discuss options for new behaviors.  Explore patterns in patient's actions and choices and discuss options for new behaviors.    Cognitive-behavioral Therapy  Discuss common cognitive distortions, identify them in patient's life.  Explore ways to challenge, replace, and act against these cognitions.    Acceptance and Commitment Therapy  Explore and identify important values in patient's life.  Discuss ways to commit to behavioral activation around these values.    Psychodynamic psychotherapy  Discuss patient's emotional dynamics and issues and how they impact behaviors.  Explore patient's history of relationships and how they impact present behaviors.  Explore how  to work with and make changes in these schemas and patterns.    Narrative Therapy  Explore the patient's story of his/her life from his/her perspective.  Explore alternate ways of understanding their experience, identifying exceptions, developing new themes.    Interpersonal Psychotherapy  Explore patterns in relationships that are effective or ineffective at helping patient reach their goals, find satisfying experience.  Discuss new patterns or behaviors to engage in for improved social functioning.    Behavioral Activation  Discuss steps patient can take to become more involved in meaningful activity.  Identify barriers to these activities and explore possible solutions.    Mindfulness-Based Strategies  Discuss skills based on development and application of mindfulness.  Skills drawn from compassion-focused therapy, dialectical behavior therapy, mindfulness-based stress reduction, mindfulness-based cognitive therapy, etc.      Patient has reviewed and agreed to the above plan.      Joseph Murillo Psy.D,    Behavioral Health Clinician   [facility]     8/4/2023  Answers for HPI/ROS submitted by the patient on 2/28/2023  If you checked off any problems, how difficult have these problems made it for you to do your work, take care of things at home, or get along with other people?: Very difficult  PHQ9 TOTAL SCORE: 6    Answers for HPI/ROS submitted by the patient on 6/28/2023  If you checked off any problems, how difficult have these problems made it for you to do your work, take care of things at home, or get along with other people?: Somewhat difficult  PHQ9 TOTAL SCORE: 3Answers submitted by the patient for this visit:  Patient Health Questionnaire (Submitted on 9/20/2023)  If you checked off any problems, how difficult have these problems made it for you to do your work, take care of things at home, or get along with other people?: Somewhat difficult  PHQ9 TOTAL SCORE: 5

## 2023-09-21 NOTE — LETTER
9/21/2023         RE: Frandy Workman  [address] [address]      Is the patient currently in the state of [place]? YES    Visit mode:VIDEO    If the visit is dropped, the patient can be reconnected by: VIDEO VISIT: Send to e-mail at: [email or web address]    Will anyone else be joining the visit? YES: How would they like to receive their invitation? Text to cell phone: Paty Gaviria in [facility] [phone] back up [phone] Don  (If patient encounters technical issues they should call [phone])    How would you like to obtain your AVS? MyChart    Are changes needed to the allergy or medication list? Pt stated no med changes    Reason for visit: RECHECK    Andra OLEARY       Video-Visit Details    Type of service:  Video Visit    Video Visit Start Time: 11:30    Video End Time: 12:00    Originating Location (pt. Location): Home      Distant Location (provider location):  Off-site     Platform used for Video Visit: Odette        I am seeing Miller Rollins today for follow-up of recurrent hepatocellular carcinoma.    He is 58 years old and was originally diagnosed about 5 years ago with 2 lesions in his liver treated with TACE followed by liver transplant in November 2019.  On routine surveillance imaging in June 2023 he had new peritoneal masses and underwent laparoscopy with his largest mass adjacent to the cecum resected for diagnostic purposes to confirm metastatic hepatocellular carcinoma.  He began therapy with sorafenib at 400 mg twice per day at the end of July.  He developed modest diarrhea with that but very severe nausea and vomiting during his first week that led to severe dehydration and the development of acute renal failure severe enough to require 1 round of hemodialysis.  His lisinopril and Jardiance were discontinued at that time  He finally recovered enough to resume sorafenib at a dose of just 200 mg/day couple of weeks ago.  At this point he has no diarrhea his  appetite is good and he is eating well though trying to follow a relatively restrictive diet.  As he is recovered his blood pressure had gone up a fair bit and he recently was started on additional therapy for that.  He feels like he is doing a little bit better with his blood sugar control.  He has been having a significant amount of trouble sleeping seldom sleeping even 4 hours a night.  She had some trouble with muscle cramping which seems to be helped with the use of Biofreeze patches on his back, but last night he woke up with severe cramping in his legs which required a long period of walking to resolve.    On exam via the video link today he is hypo-manic, but otherwise appears well.    I personally reviewed his CT scan and he has some minor peritoneal changes consistent with his known peritoneal carcinomatosis and nothing that looks like progressive disease.    His current lab work shows normal electrolytes and a creatinine is returned almost to normal at 1.35.  His bilirubin, albumin and liver enzymes are normal.  He has mild macrocytic anemia with a hemoglobin of 10.9 and otherwise unremarkable blood counts.    Assessment/plan:  1.  Metastatic hepatocellular cancer in the setting of a prior liver transplant.  He has tolerated the reinstitution of his sorafenib at a very low dose and we will now go up to a dose of 200 mg twice per day.  He will let us know if he develops toxicity with this, otherwise we will plan on seeing him in about 2 weeks with the intent to further increase his dose if he is tolerating it well.  I told him we would hope to get back to the full dose but it is likely will find an intermediate level that would be tolerable and adequate to manage his disease.  We will plan on another response assessment in a couple of months.  2.  Hypomania.  He objected to characterizing this is bipolar disorder.  He has follow-up later today with behavioral health.  I suspect management of his amber will  help with his insomnia.  3.  Renal failure.  This continues to improve and he has ongoing follow-up with nephrology.  4.  Hypertension he has ongoing follow-up for management of that and appears to be doing well with his current regimen, but we can anticipate with the increasing doses of sorafenib but this may become a bigger problem.  5.  Diabetes he has ongoing follow-up with endocrine.  6.  Liver transplant.    Total time by me on the date of service inclusive of the above visit review of extensive prior hospital records, current imaging and labs, ordering, coordination of care, and documentation was greater than 75 minutes        Miguel Villarreal MD

## 2023-09-21 NOTE — LETTER
9/21/2023         RE: Frandy Workman  [address] [facility] [address]        Dear Colleague,    Thank you for referring your patient, Frandy Workman, to the [facility]. Please see a copy of my visit note below.    Is the patient currently in the state of [place]? YES    Visit mode:VIDEO    If the visit is dropped, the patient can be reconnected by: VIDEO VISIT: Send to e-mail at: [email or web address]    Will anyone else be joining the visit? YES: How would they like to receive their invitation? Text to cell phone: Paty Gaviria in [facility] [phone] back up [phone] Don  (If patient encounters technical issues they should call [phone]    How would you like to obtain your AVS? MyChart    Are changes needed to the allergy or medication list? Pt stated no med changes    Reason for visit: RECHECK    Andra James F       Video-Visit Details    Type of service:  Video Visit    Video Visit Start Time: 11:30    Video End Time: 12:00    Originating Location (pt. Location): Home      Distant Location (provider location):  Off-site     Platform used for Video Visit: Odette        I am seeing Miller Rollins today for follow-up of recurrent hepatocellular carcinoma.    He is 58 years old and was originally diagnosed about 5 years ago with 2 lesions in his liver treated with TACE followed by liver transplant in November 2019.  On routine surveillance imaging in June 2023 he had new peritoneal masses and underwent laparoscopy with his largest mass adjacent to the cecum resected for diagnostic purposes to confirm metastatic hepatocellular carcinoma.  He began therapy with sorafenib at 400 mg twice per day at the end of July.  He developed modest diarrhea with that but very severe nausea and vomiting during his first week that led to severe dehydration and the development of acute renal failure severe enough to require 1 round of hemodialysis.  His lisinopril and  Jardiance were discontinued at that time  He finally recovered enough to resume sorafenib at a dose of just 200 mg/day couple of weeks ago.  At this point he has no diarrhea his appetite is good and he is eating well though trying to follow a relatively restrictive diet.  As he is recovered his blood pressure had gone up a fair bit and he recently was started on additional therapy for that.  He feels like he is doing a little bit better with his blood sugar control.  He has been having a significant amount of trouble sleeping seldom sleeping even 4 hours a night.  She had some trouble with muscle cramping which seems to be helped with the use of Biofreeze patches on his back, but last night he woke up with severe cramping in his legs which required a long period of walking to resolve.    On exam via the video link today he is hypo-manic, but otherwise appears well.    I personally reviewed his CT scan and he has some minor peritoneal changes consistent with his known peritoneal carcinomatosis and nothing that looks like progressive disease.    His current lab work shows normal electrolytes and a creatinine is returned almost to normal at 1.35.  His bilirubin, albumin and liver enzymes are normal.  He has mild macrocytic anemia with a hemoglobin of 10.9 and otherwise unremarkable blood counts.    Assessment/plan:  1.  Metastatic hepatocellular cancer in the setting of a prior liver transplant.  He has tolerated the reinstitution of his sorafenib at a very low dose and we will now go up to a dose of 200 mg twice per day.  He will let us know if he develops toxicity with this, otherwise we will plan on seeing him in about 2 weeks with the intent to further increase his dose if he is tolerating it well.  I told him we would hope to get back to the full dose but it is likely will find an intermediate level that would be tolerable and adequate to manage his disease.  We will plan on another response assessment in a couple  of months.  2.  Hypomania.  He objected to characterizing this is bipolar disorder.  He has follow-up later today with behavioral health.  I suspect management of his amber will help with his insomnia.  3.  Renal failure.  This continues to improve and he has ongoing follow-up with nephrology.  4.  Hypertension he has ongoing follow-up for management of that and appears to be doing well with his current regimen, but we can anticipate with the increasing doses of sorafenib but this may become a bigger problem.  5.  Diabetes he has ongoing follow-up with endocrine.  6.  Liver transplant.    Total time by me on the date of service inclusive of the above visit review of extensive prior hospital records, current imaging and labs, ordering, coordination of care, and documentation was greater than 75 minutes      Again, thank you for allowing me to participate in the care of your patient.        Sincerely,        Miguel Villarreal MD

## 2023-09-21 NOTE — PATIENT INSTRUCTIONS
"Patient Education   Tips for Sleep Hygiene  \"Sleep hygiene\" means having good sleep habits.Follow these tips to sleep better at night:   Get on a schedule. Go to bed and get up at about the same time every day.  Listen to your body. Only try to sleep when you actually feel tired or sleepy.  Be patient. If you haven't been able to get to sleep after about 30 minutes or more, get up and do something calming or boring until you feel sleepy. Then return to bed and try again.  Don't have caffeine (coffee, tea, cola drinks, chocolate and some medicines), alcohol or nicotine (cigarettes). These can make it harder for you to fall asleep and stay asleep.  Use your bed for sleeping only. That means no TV, computer or homework in bed, especially during the evening and before bedtime.  Don't nap during the day. If you must nap, make sure it is for less than 20 minutes.  Create sleep rituals that remind your body it is time to sleep. Examples include breathing exercises, stretching or reading a book.  Avoid all electronic media (smart phone, computer, tablet) within 2 hours of bed time. The \"blue light\" in these devices activates the part of the brain that keeps you awake.  Dim the lights at night.  Get early morning sources of light (walk in the sunshine) to help set sleep patterns at night.  Try a bath or shower before bed. Having a warm bath 1 to 2 hours before bedtime can help you feel sleepy. Hot baths can make you alert, so be mindful of the temperature.  Don't watch the clock. Checking the clock during the night can wake you up. It can also lead to negative thoughts such as, \"I will never fall asleep,\" which can increase anxiety and sleeplessness.  Use a sleep diary. Track your sleep schedule to know your sleep patterns and to see where you can improve.  Get regular exercise every day. Try not to do heavy exercise in the 4 hours before bedtime.  Eat a healthy, balanced diet.  Try eating a light, healthy snack before bed, " Pediatric H&P Note      History Of Present Illness  Gloria is a 22 month old female presenting with 5 day fevers. Mom at bedside. Mom reports the patient started having mild cough and congestion around that time.  Reports fever max of 102 F. Endorses that with fevers patient has had shaking up UE and LE with fevers, likely chills. Reports patient hae been sleepier that normal. Denies confusion. Mom also reports patients has had decreased p.o. intake but is still voiding normally. Denies changes in urine / bowel habits. Mom reports 2 episodes of vomiting. Mom endorses brother has been sick with ear infection but is currently better.  Denies other sick contacts or recent travel. Endorsees patient was seen by pediatrician and noted not to have ear infection. Mom said that the PCP told her likely symptoms are viral and was recommended to treat supportively with Tylenol and Motrin at home but feel that her symptoms have persisted which prompted her to bring to the ER. Of note mom reports patient had COVID infection in January 2022.     ED Course:  Vitals: Temp 104.4 F, HR 110s-160s, RR 20-40s, -117 / 50-70s. SpO2 100%  Labs: WBC 16.8, Hgb 11.4, ANC 11.3, Procal 2.7,  ESR 89, Monospot neg, EBV pending, Quad screen neg, UA w/ small blood, UCx and BCx pending   Imaging: CXR wnl  Intervention: s/p Tylenol, Ibuprofen, CTX, NS bolus     Past Medical History   has no past medical history on file.    Birth History  Full term, LTCS; no NICU stay     Surgical History   has no past surgical history on file.     Social History  Lives at home with Mom, Dad, and older sibling. No one smokes at home    Family History  Maternal Grandma with DM    Allergies  ALLERGIES:  Johnie   (food or med) and Peanut   (food or med)    Medications  Medications Prior to Admission   Medication Sig Dispense Refill   • ibuprofen (CHILDRENS ADVIL) 100 MG/5ML suspension Take by mouth every 8 hours as needed for Fever.         Immunizations  UTD per mom      Outpatient Pediatrician  Gricelda Puga MD    ROS  Review of Systems   Constitutional: Positive for appetite change (decreased) and fever.   HENT: Positive for congestion.    Eyes: Negative.    Respiratory: Negative.    Cardiovascular: Negative.    Gastrointestinal: Positive for vomiting. Negative for diarrhea.   Genitourinary: Negative.    Musculoskeletal: Negative.    Skin: Negative.    Neurological: Negative.    Hematological: Negative.  Negative for adenopathy.   Psychiatric/Behavioral: Negative.          Physical Exam  Visit Vitals  BP (!) 117/67 (BP Location: LLE - Left lower extremity, Patient Position: Supine)   Pulse 125   Temp 97.5 °F (36.4 °C) (Rectal)   Resp 30   Wt 13.8 kg (30 lb 6.8 oz)   SpO2 100%        No intake or output data in the 24 hours ending 04/26/22 0123      Physical Exam  Constitutional:       General: She is active. She is not in acute distress.     Appearance: She is not toxic-appearing.   HENT:      Head: Normocephalic and atraumatic.      Right Ear: External ear normal.      Left Ear: External ear normal.      Nose: Congestion and rhinorrhea present.      Mouth/Throat:      Mouth: Mucous membranes are moist.      Neck: Neck supple.   Eyes:      General:         Right eye: No discharge.         Left eye: No discharge.   Cardiovascular:      Rate and Rhythm: Normal rate and regular rhythm.      Pulses: Normal pulses.   Pulmonary:      Effort: Pulmonary effort is normal. No respiratory distress or nasal flaring.   Abdominal:      General: Abdomen is flat.      Palpations: Abdomen is soft.   Musculoskeletal:         General: Normal range of motion.   Lymphadenopathy:      Cervical: No cervical adenopathy.   Skin:     General: Skin is warm and dry.   Neurological:      General: No focal deficit present.      Mental Status: She is alert and oriented for age.            LABORATORY DATA:    Admission on 04/25/2022   Component Date Value Ref Range Status   • Sodium 04/25/2022 133 (A) 135 -  but avoid eating a heavy meal.  Create the right sleeping area. A cool, dark, quiet room is best. If needed, try earplugs, fans and blackout curtains.  Keep your daytime routine the same even if you have a bad night sleep. Avoiding activities the next day can make it harder to sleep.  For informational purposes only. Not to replace the advice of your health care provider.   Copyright   2013 [facility]. All rights reserved. Gini [id number] - Rev 04/21.          145 mmol/L Final   • Potassium 04/25/2022 3.7  3.4 - 5.1 mmol/L Final   • Chloride 04/25/2022 104  98 - 107 mmol/L Final   • Carbon Dioxide 04/25/2022 15 (A) 21 - 32 mmol/L Final   • Anion Gap 04/25/2022 18  10 - 20 mmol/L Final   • Glucose 04/25/2022 167 (A) 70 - 99 mg/dL Final   • BUN 04/25/2022 10  5 - 18 mg/dL Final   • Creatinine 04/25/2022 0.27  0.16 - 0.42 mg/dL Final   • Glomerular Filtration Rate 04/25/2022    Final    GFR not calculated for age less than 18 years   • BUN/ Creatinine Ratio 04/25/2022 37 (A) 7 - 25 Final   • Calcium 04/25/2022 9.5  8.0 - 11.0 mg/dL Final   • Bilirubin, Total 04/25/2022 0.3  0.2 - 1.4 mg/dL Final   • GOT/AST 04/25/2022 24  10 - 55 Units/L Final   • GPT/ALT 04/25/2022 24  6 - 45 Units/L Final   • Alkaline Phosphatase 04/25/2022 270  125 - 272 Units/L Final   • Albumin 04/25/2022 3.5  3.5 - 4.8 g/dL Final   • Protein, Total 04/25/2022 8.1 (A) 5.6 - 7.5 g/dL Final   • Globulin 04/25/2022 4.6 (A) 2.0 - 4.0 g/dL Final   • A/G Ratio 04/25/2022 0.8 (A) 1.0 - 2.4 Final   • COLOR, URINALYSIS 04/25/2022 Yellow   Final   • APPEARANCE, URINALYSIS 04/25/2022 Hazy   Final   • GLUCOSE, URINALYSIS 04/25/2022 Negative  Negative mg/dL Final   • BILIRUBIN, URINALYSIS 04/25/2022 Negative  Negative Final   • KETONES, URINALYSIS 04/25/2022 15   (A) Negative mg/dL Final   • SPECIFIC GRAVITY, URINALYSIS 04/25/2022 1.020  1.005 - 1.030 Final   • OCCULT BLOOD, URINALYSIS 04/25/2022 Small (A) Negative Final   • PH, URINALYSIS 04/25/2022 6.0  5.0 - 7.0 Final   • PROTEIN, URINALYSIS 04/25/2022 Negative  Negative mg/dL Final   • UROBILINOGEN, URINALYSIS 04/25/2022 0.2  0.2, 1.0 mg/dL Final   • NITRITE, URINALYSIS 04/25/2022 Negative  Negative Final   • LEUKOCYTE ESTERASE, URINALYSIS 04/25/2022 Negative  Negative Final   • SQUAMOUS EPITHELIAL, URINALYSIS 04/25/2022 1 to 5  None Seen, 1 to 5 /hpf Final   • ERYTHROCYTES, URINALYSIS 04/25/2022 1 to 2  None Seen, 1 to 2 /hpf Final   • LEUKOCYTES, URINALYSIS  04/25/2022 None Seen  None Seen, 1 to 5 /hpf Final   • BACTERIA, URINALYSIS 04/25/2022 None Seen  None Seen /hpf Final   • HYALINE CASTS, URINALYSIS 04/25/2022 None Seen  None Seen, 1 to 5 /lpf Final   • MUCUS 04/25/2022 Present   Final   • RBC Sedimentation Rate 04/25/2022 89 (A) 0 - 20 mm/hr Final   • C-Reactive Protein 04/25/2022 12.0 (A) <=1.0 mg/dL Final   • Procalcitonin 04/25/2022 2.70 (A) <=0.09 ng/mL Final      Bacterial Sepsis:  <0.5 ng/mL:    Sepsis not likely. Localized bacterial infection possible.  0.5 - 2 ng/mL: Sepsis or other conditions possible  >2.0 ng/mL:    High risk of sepsis/septic shock    NOTE: If bacterial sepsis is of high clinical suspicion but PCT<2 ng/mL,  consider recheck PCT 6-24 hours after initial test.     Lower Respiratory Tract Infection (LRTI):  <0.1 ng/mL:       Bacterial infection highly unlikely  0.1 - 0.25 ng/mL: Bacterial infection unlikely  0.26 - 0.5 ng/mL: Bacterial infection likely  > 0.5 ng/mL:      Bacterial infection highly likely    NOTE: Patients with advanced CKD or medical/surgical trauma may have  elevated baseline PCT (>0.5 ng/mL) in the absence of bacterial infection. If  bacterial infection is suspected, consider repeat PCT in 2 to 4 days to guide de-escalation or discontinuation of antibiotic therapy.  Higher reference range cutoffs may be appropriate for patients <3 days old.     • Rapid SARS-COV-2 by PCR 04/25/2022 Not Detected  Not Detected / Detected / Presumptive Positive / Inhibitors present Final   • Influenza A by PCR 04/25/2022 Not Detected  Not Detected Final   • Influenza B by PCR 04/25/2022 Not Detected  Not Detected Final   • RSV BY PCR 04/25/2022 Not Detected  Not Detected Final   • Isolation Guidelines 04/25/2022    Final    Do not use this test result as the sole decision-maker for discontinuation of isolation.   Clinical evaluation should be considered for other respiratory illness requiring transmission-based isolation.    -    No fever  (<99.0 F/37.2 C) for at least 24 hours without the use of fever-reducing medications    AND  -    Respiratory symptoms have improved or resolved (e.g. cough, shortness of breath)     AND  -    COVID-19 negative test    See COVID-19 Deisolation Resource Guide   • Procedural Comment 04/25/2022    Final    SARS-COV-2 nucleic acid has not been detected indicating the absence of COVID-19.    This test was performed using the [employer] Xpert Xpress SARS-CoV-2/Flu/RSV RT-PCR test that has been given Emergency Use Authorization (EUA) by the [place] Food and Drug Administration (FDA).  These tests are considered definitive and do not need to be confirmed by another method.   • WBC 04/25/2022 16.8  5.0 - 19.5 K/mcL Final   • RBC 04/25/2022 4.59 (A) 3.10 - 4.50 mil/mcL Final   • HGB 04/25/2022 11.4  10.5 - 13.5 g/dL Final   • HCT 04/25/2022 35.9  29.0 - 41.0 % Final   • MCV 04/25/2022 78.2  70.0 - 86.0 fl Final   • MCH 04/25/2022 24.8  23.0 - 31.0 pg Final   • MCHC 04/25/2022 31.8  30.0 - 36.0 g/dL Final   • RDW-CV 04/25/2022 14.7  11.0 - 15.0 % Final   • RDW-SD 04/25/2022 41.1  35.0 - 47.0 fL Final   • PLT 04/25/2022 323  140 - 450 K/mcL Final   • NRBC 04/25/2022 0  <=0 /100 WBC Final   • Neutrophil, Percent 04/25/2022 67  % Final   • Lymphocytes, Percent 04/25/2022 23  % Final   • Mono, Percent 04/25/2022 9  % Final   • Eosinophils, Percent 04/25/2022 0  % Final   • Basophils, Percent 04/25/2022 0  % Final   • Immature Granulocytes 04/25/2022 1  % Final   • Absolute Neutrophils 04/25/2022 11.3 (A) 1.5 - 8.5 K/mcL Final   • Absolute Lymphocytes 04/25/2022 3.8 (A) 4.0 - 10.5 K/mcL Final   • Absolute Monocytes 04/25/2022 1.5 (A) 0.0 - 0.8 K/mcL Final   • Absolute Eosinophils  04/25/2022 0.0  0.0 - 0.7 K/mcL Final   • Absolute Basophils 04/25/2022 0.1  0.0 - 0.2 K/mcL Final   • Absolute Immmature Granulocytes 04/25/2022 0.1  0.0 - 0.2 K/mcL Final   • Mononucleosis Screen 04/25/2022 Negative  Negative Final    If acutely ill,  convalescent specimen in 2 weeks may be necessary to confirm diagnosis.         IMAGING STUDIES:    XR CHEST PA OR AP 1 VIEW    (Results Pending)        Assessment and Plan:  Principal Problem:    Acute febrile illness in pediatric patient      Gloria Tillman is a 22 month old female patient who presents for 5 days of fevers, cough, congestion, and decreased PO intake. CXR unremarkable. Labs noted for hyponatremia, elevated procal and ESR, and negative monospot. Overall likely c/f viral infection admitted for further management.     NEURO/PSYCH:  -Tylenol q6h PRN  -Ibuprofen q6h PRN  -ctm reported shaking like episodes with associated fevers    Fen/GI:  -PO ad elsy  -mIVF D5/NS    CARDIO:  -ctm per floor vitals    PULM:  -sating appropriately on RA    ID:   -differential include viral URI vs Kawasaki vs MIS-C  -fevers, tachy, and elevated BP   -vitals improve with temp control  -Procal elevated  -Quad screen negative  -unlikely Kawasaki given no rash, conjunctivitis, or cervical LAD  -f/u RVP  -f/u BCx and UCx; will hold on Abx at this time   -f/u EBV   -will repeat CBC, CMP, CRP      Lines: PIV. Functional. Utilized for med/fluids PRN. Continues to be medically necessary.    Dispo: Floors     Discussed with senior resident and attending.    Matias Parry MD  PGY-1      Senior Resident Addendum  Patient seen and examined on 4/26/22 with mother at bedside. Pertinent labs and imaging reviewed. I agree with the history, physical exam, and documentation as outlined with the following addendum.     In short, Gloria is a 22 month old female with history of COVID (Jan 2022) who presented to the ED with 5 days of fever, cough, and congestion. Workup in the ED remarkable for CBC with absolue neutrophilia to 11,300. Elevated inflammatory markers (CRP 12, ESR 89, procal 2.7). CMP with low bicarb (15) consistent with dehydration. UA not suggestive of UTI. COVID/influenza/RSV negative. CXR with no focal opacities. Patient received  a dose of CTX.     On exam, patient initially sleeping, woke up during exam and was fussy but consolable, +oropharyngeal erythema, +erythematous TMs but not buldging, heart RRR, lungs CTAB, abdomen soft, no focal neuro deficits, pulses 2+ and cap refill < 3 seconds.     Differential includes viral syndrome vs Kawasaki (although does not meet clinical criteria) vs MIS-C (although timeline not consistent with typical course). Will follow blood and urine cultures, hold off on further antibiotics at this time given no focal signs of bacterial infection. Respiratory pathogen panel pending. Continue IVFs until tolerating adequate PO intake. Tylenol/Motrin PRN fevers. Consider MIS-C workup and/or ID consult pending patient's fever curve.     Lisa Claire DO  Pediatric Resident, PGY-3    EM Intern addendum. Plan updated. Attending addendum to follow

## 2023-09-21 NOTE — PROGRESS NOTES
Is the patient currently in the state of [place]? YES    Visit mode:VIDEO    If the visit is dropped, the patient can be reconnected by: VIDEO VISIT: Send to e-mail at: [email or web address]    Will anyone else be joining the visit? YES: How would they like to receive their invitation? Text to cell phone: Paty Gaviria in [facility] [phone] back up [phone] Don  (If patient encounters technical issues they should call [phone]    How would you like to obtain your AVS? MyChart    Are changes needed to the allergy or medication list? Pt stated no med changes    Reason for visit: RECHECK    Andra OLEARY       Video-Visit Details    Type of service:  Video Visit    Video Visit Start Time: 11:30    Video End Time: 12:00    Originating Location (pt. Location): Home      Distant Location (provider location):  Off-site     Platform used for Video Visit: Odette        I am seeing Miller Rollins today for follow-up of recurrent hepatocellular carcinoma.    He is 58 years old and was originally diagnosed about 5 years ago with 2 lesions in his liver treated with TACE followed by liver transplant in November 2019.  On routine surveillance imaging in June 2023 he had new peritoneal masses and underwent laparoscopy with his largest mass adjacent to the cecum resected for diagnostic purposes to confirm metastatic hepatocellular carcinoma.  He began therapy with sorafenib at 400 mg twice per day at the end of July.  He developed modest diarrhea with that but very severe nausea and vomiting during his first week that led to severe dehydration and the development of acute renal failure severe enough to require 1 round of hemodialysis.  His lisinopril and Jardiance were discontinued at that time  He finally recovered enough to resume sorafenib at a dose of just 200 mg/day couple of weeks ago.  At this point he has no diarrhea his appetite is good and he is eating well though trying to follow a relatively  restrictive diet.  As he is recovered his blood pressure had gone up a fair bit and he recently was started on additional therapy for that.  He feels like he is doing a little bit better with his blood sugar control.  He has been having a significant amount of trouble sleeping seldom sleeping even 4 hours a night.  She had some trouble with muscle cramping which seems to be helped with the use of Biofreeze patches on his back, but last night he woke up with severe cramping in his legs which required a long period of walking to resolve.    On exam via the video link today he is hypo-manic, but otherwise appears well.    I personally reviewed his CT scan and he has some minor peritoneal changes consistent with his known peritoneal carcinomatosis and nothing that looks like progressive disease.    His current lab work shows normal electrolytes and a creatinine is returned almost to normal at 1.35.  His bilirubin, albumin and liver enzymes are normal.  He has mild macrocytic anemia with a hemoglobin of 10.9 and otherwise unremarkable blood counts.    Assessment/plan:  1.  Metastatic hepatocellular cancer in the setting of a prior liver transplant.  He has tolerated the reinstitution of his sorafenib at a very low dose and we will now go up to a dose of 200 mg twice per day.  He will let us know if he develops toxicity with this, otherwise we will plan on seeing him in about 2 weeks with the intent to further increase his dose if he is tolerating it well.  I told him we would hope to get back to the full dose but it is likely will find an intermediate level that would be tolerable and adequate to manage his disease.  We will plan on another response assessment in a couple of months.  2.  Hypomania.  He objected to characterizing this is bipolar disorder.  He has follow-up later today with behavioral health.  I suspect management of his amber will help with his insomnia.  3.  Renal failure.  This continues to improve and  he has ongoing follow-up with nephrology.  4.  Hypertension he has ongoing follow-up for management of that and appears to be doing well with his current regimen, but we can anticipate with the increasing doses of sorafenib but this may become a bigger problem.  5.  Diabetes he has ongoing follow-up with endocrine.  6.  Liver transplant.    Total time by me on the date of service inclusive of the above visit review of extensive prior hospital records, current imaging and labs, ordering, coordination of care, and documentation was greater than 75 minutes

## 2023-09-22 ENCOUNTER — TELEPHONE (OUTPATIENT)
Dept: TRANSPLANT | Facility: CLINIC | Age: 58
End: 2023-09-22

## 2023-09-22 DIAGNOSIS — R11.2 NAUSEA AND VOMITING, UNSPECIFIED VOMITING TYPE: ICD-10-CM

## 2023-09-22 NOTE — TELEPHONE ENCOUNTER
Miller called, wanted his care team to have a look at both progress notes under Dr. Joseph Murillo and Dr. Miguel Villarreal.  Also, was inquiring about sleep aides.

## 2023-09-25 ASSESSMENT — ENCOUNTER SYMPTOMS
SORE THROAT: 0
SKIN CHANGES: 0
MYALGIAS: 1
WEIGHT LOSS: 1
ARTHRALGIAS: 1
POLYDIPSIA: 1
TASTE DISTURBANCE: 0
EYE REDNESS: 0
SINUS CONGESTION: 1
POOR WOUND HEALING: 0
SMELL DISTURBANCE: 0
FATIGUE: 1
HALLUCINATIONS: 0
DOUBLE VISION: 0
BACK PAIN: 1
FEVER: 0
JOINT SWELLING: 0
WEIGHT GAIN: 0
ALTERED TEMPERATURE REGULATION: 0
NECK MASS: 0
TROUBLE SWALLOWING: 0
MUSCLE CRAMPS: 1
HOARSE VOICE: 1
MUSCLE WEAKNESS: 0
EYE IRRITATION: 1
DECREASED APPETITE: 0
INCREASED ENERGY: 1
STIFFNESS: 1
NAIL CHANGES: 1
CHILLS: 0
NECK PAIN: 1
SINUS PAIN: 1
POLYPHAGIA: 0
NIGHT SWEATS: 0
EYE WATERING: 0
EYE PAIN: 0

## 2023-09-25 NOTE — PROGRESS NOTES
Outcome for 23 8:33 AM: Data uploaded on [employer]  Yojana Wang MA  Outcome for 23 8:36 AM: Data obtained via [employer] website  Oriana Dikcinson LPN     Patient is showing 4/5 MNCM met. BP out range   Oriana Dickinson LPN    Start time: 1307  End time: 1330  Provider location: off site- home  Patient location: off site- home.        HPI:     Frandy Workman is a 58 year old man here for follow up of type 2 diabetes complicated by nephropathy, retinopathy and neuropathy. 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 Unfortunately, he recently found out he has new peritoneal metastatic disease and has started chemo.  Having some nausea, no vomiting.  Staying well hydrated.   Still walking a lot- about 5 miles a day.   He has been having some lows and has had to cut back on novolog.     Appetite is normal.  He is eating a bit less.      Had diarrhea this weekend. Basically took the approach that he was going to drink a lot of water- 320 oz Saturday and 360 oz yesterday. Immodium.  Blood pressure had been running very high and will be starting up metoprolol.      DM Regimen:  - Tresiba 15 units /day (less than half of previous dose).  - Carbohydrate coverage to 1 unit:15g of carbohydrate   - Correction Novolounit Novolo mg /dl >180    - Empagliflozin 10 mg daily (had JERONIMO/hypovolemias on higher dose)-  NOT TAKING- this was stopped since last hospitalization.  - Metformin- started to get diarrhea from it. 500 mg daily. NOT TAKING- this was stopped since last hospitalization.    Patient wears a camilo 2 sensor with an overall average of 176 mg/dL (CV 29%, TIR 60%, hypo 0%, severe hypo 0%) over the past 2 weeks.   Recent glucose is as follows:                             Diabetes Control:   Lab Results   Component Value Date    A1C 6.0 01/10/2022     A1C 6.8 07/13/2021    A1C 6.0 12/30/2020    A1C 6.3 06/13/2020    A1C 6.6 08/08/2018    A1C 6.5 06/09/2017    A1C 7.8 10/25/2016       Past Medical History:   Diagnosis Date    Anemia 2013    Arthritis     BPH (benign prostatic hyperplasia)     CAD (coronary artery disease) 04/01/2019    Cholelithiasis     Conductive hearing loss 08/16/2017    Depressive disorder 1986    Suffer effects throughout life    Gastroesophageal reflux disease 12/01/2014    HCC (hepatocellular carcinoma) (H) 01/22/2019    History of diabetic retinopathy 07/2018    HTN (hypertension) 11/20/2019    Hyperlipidemia     Liver cirrhosis secondary to ESTRADA (H)     Liver transplanted (H) 11/11/2019    Portal vein thrombosis 04/11/2019    Type II diabetes mellitus (H)        Past Surgical History:   Procedure Laterality Date    BYPASS GRAFT ARTERY CORONARY N/A 7/14/2021    Procedure: median sternotomy, on cardiopulmonary bypass, CORONARY ARTERY BYPASS GRAFT (CABG) x2 with left greater saphenous vein endoscopic harvest and left internal mammery artery harvest;  Surgeon: Tom Zapata MD;  Location: [facility] OR    COLONOSCOPY      2015    COLONOSCOPY N/A 12/6/2019    Procedure: COLONOSCOPY, WITH POLYPECTOMY AND BIOPSY;  Surgeon: Adam Morton MD;  Location:  GI    CV CENTRAL VENOUS CATHETER PLACEMENT N/A 7/12/2021    Procedure: Central Venous Catheter Placement;  Surgeon: Fermin Polanco MD;  Location:  HEART CARDIAC CATH LAB    CV CORONARY ANGIOGRAM N/A 7/12/2021    Procedure: Coronary Angiogram;  Surgeon: Fermin Polanco MD;  Location: [facility] CARDIAC CATH LAB    CV HEART CATHETERIZATION WITH POSSIBLE INTERVENTION N/A 2/26/2019    Procedure: CORS;  Surgeon: Jagdish Hoyt MD;  Location: [facility] CARDIAC CATH LAB    CV INTRA AORTIC BALLOON N/A 7/12/2021    Procedure: Intra Aortic Balloon Pump Insertion;  Surgeon: Fermin Polanco MD;  Location: [facility] CARDIAC CATH LAB    ESOPHAGOSCOPY,  GASTROSCOPY, DUODENOSCOPY (EGD), COMBINED N/A 11/17/2016    Procedure: COMBINED ESOPHAGOSCOPY, GASTROSCOPY, DUODENOSCOPY (EGD);  Surgeon: Santi Rosas MD;  Location: [facility] GI    ESOPHAGOSCOPY, GASTROSCOPY, DUODENOSCOPY (EGD), COMBINED N/A 11/17/2017    Procedure: COMBINED ESOPHAGOSCOPY, GASTROSCOPY, DUODENOSCOPY (EGD);  EGD;  Surgeon: Santi Rosas MD;  Location: [facility] GI    ESOPHAGOSCOPY, GASTROSCOPY, DUODENOSCOPY (EGD), COMBINED N/A 12/28/2018    Procedure: EGD;  Surgeon: Santi Rosas MD;  [facility]    ESOPHAGOSCOPY, GASTROSCOPY, DUODENOSCOPY (EGD), COMBINED N/A 12/6/2019    Procedure: ESOPHAGOGASTRODUODENOSCOPY, WITH BIOPSY;  Surgeon: Adam Morton MD;  Location: [facility] GI    ESOPHAGOSCOPY, GASTROSCOPY, DUODENOSCOPY (EGD), COMBINED N/A 2/13/2020    Procedure: ESOPHAGOGASTRODUODENOSCOPY (EGD);  Surgeon: Santi Rosas MD;  Location: [facility] GI    EXCISE MASS ABDOMEN N/A 7/13/2023    Procedure: Laparoscopic lysis of adhesions, laparoscopic resection of abdominal mass;  Surgeon: Ruiz Chu MD;  Location: [facility] OR    HEAD & NECK SURGERY      12/2017 at [facility].     IMPLANT GOLD WEIGHT EYELID Right 11/16/2017    Procedure: IMPLANT WEIGHT EYELID;  Right Upper Eyelid Weight, right tarsal strip lower eyelid;  Surgeon: Milana Malave MD;  [facility]    IR CHEMO EMBOLIZATION  1/22/2019    KNEE SURGERY Left     ORTHOPEDIC SURGERY      PAROTIDECTOMY, RADICAL NECK DISSECTION Right 11/2/2017    Procedure: PAROTIDECTOMY, RADICAL NECK DISSECTION;  Right Superfacial Parotidectomy , Facial nerve repair. with [facility] facial nerve monitor.;  Surgeon: Asiya Morgan MD;  Location: [facility]    PHACOEMULSIFICATION CLEAR CORNEA WITH STANDARD INTRAOCULAR LENS IMPLANT Right 1/24/2023    Procedure: PHACOEMULSIFICATION, COMPLEX CATARACT, WITH INTRAOCULAR LENS IMPLANT WITH TRYPAN RIGHT EYE;  Surgeon: Enriqueta Martin MD;  Location: [facility] OR    PHACOEMULSIFICATION WITH STANDARD INTRAOCULAR  LENS IMPLANT Left 1/3/2023    Procedure: PHACOEMULSIFICATION, COMPLEX CATARACT, WITH STANDARD INTRAOCULAR LENS IMPLANT INSERTION LEFT EYE;  Surgeon: Enriqueta Martin MD;  Location: [facility] OR    PICC INSERTION Left 2017    4fr SL BioFlo PICC, 44cm in the L basilic vein w/ tip in the low SVC    RETURN LIVER TRANSPLANT N/A 2019    Procedure: Exploratory laparotomy, hematoma evacuation, abdominal washout;  Surgeon: Александр Ramos MD;  Location: [facility]    TRANSPLANT LIVER RECIPIENT  DONOR N/A 2019    Procedure: TRANSPLANT, LIVER, RECIPIENT,  DONOR;  Surgeon: Александр Ramos MD;  Location: [facility] OR    VASCULAR SURGERY         Family History   Problem Relation Age of Onset    Skin Cancer Mother     Cancer Mother         mastectomy    Diabetes Mother     Cerebrovascular Disease Mother     Thyroid Disease Mother     Depression Mother     Colon Cancer Father 60    Pancreatic Cancer Father 60    Prostate Cancer Father     Macular Degeneration Father     Glaucoma Father     Skin Cancer Father     Thyroid Disease Sister     Depression Sister     Asthma Sister     Sleep Apnea Brother     Colorectal Cancer Maternal Grandmother     Cancer Maternal Grandmother     Substance Abuse Maternal Grandmother         Alcohol    Prostate Cancer Maternal Grandfather     Substance Abuse Maternal Grandfather         Alcohol    Colorectal Cancer Paternal Grandmother     Liver Disease No family hx of     Melanoma No family hx of     Anesthesia Reaction No family hx of     Deep Vein Thrombosis (DVT) No family hx of        Social History     Socioeconomic History    Marital status:     Highest education level: Bachelor's degree (e.g., BA, AB, BS)   Tobacco Use    Smoking status: Former     Packs/day: 6.00     Years: 30.00     Pack years: 180.00     Types: Cigars, Cigarettes     Start date: 2016     Quit date: 10/25/2017     Years since quittin.0    Smokeless tobacco: Former     Types: Chew      Quit date: 10/31/2017    Tobacco comments:     1 tin per week   Substance and Sexual Activity    Alcohol use: No     Alcohol/week: 0.0 standard drinks     Comment: quit Sept. 1996    Drug use: No    Sexual activity: Not Currently     Partners: Female     Birth control/protection: Condom   Other Topics Concern    Parent/sibling w/ CABG, MI or angioplasty before 65F 55M? Yes   Social History Narrative    Prior , [place]     Social Determinants of Health     Intimate Partner Violence: Not At Risk    Fear of Current or Ex-Partner: No    Emotionally Abused: No    Physically Abused: No    Sexually Abused: No       Current Outpatient Medications   Medication    Alcohol Swabs (ALCOHOL PREP) 70 % PADS    ammonium lactate (AMLACTIN) 12 % external cream    aspirin (SM ASPIRIN ADULT LOW STRENGTH) 81 MG EC tablet    BD VIKTORIA U/F 32G X 4 MM insulin pen needle    benzoyl peroxide 5 % external liquid    blood glucose (NO BRAND SPECIFIED) lancets standard    Continuous Blood Gluc Sensor (FREESTYLE CLAUDIA 2 SENSOR) MISC    insulin aspart (NOVOLOG PEN) 100 UNIT/ML pen    insulin degludec (TRESIBA FLEXTOUCH) 100 UNIT/ML pen    ketoconazole (NIZORAL) 2 % external shampoo    lamiVUDine (EPIVIR) 100 MG tablet    metoprolol succinate ER (TOPROL XL) 25 MG 24 hr tablet    Multiple Vitamin (TAB-A-GLADIS) TABS    mycophenolate (GENERIC EQUIVALENT) 250 MG capsule    NIFEdipine ER OSMOTIC (PROCARDIA XL) 60 MG 24 hr tablet    ondansetron (ZOFRAN ODT) 8 MG ODT tab    pantoprazole (PROTONIX) 40 MG EC tablet    predniSONE (DELTASONE) 5 MG tablet    rosuvastatin (CRESTOR) 20 MG tablet    SORAfenib (NEXAVAR) 200 MG tablet    tamsulosin (FLOMAX) 0.4 MG capsule    Vitamin D3 50 mcg (2000 units) tablet     Current Facility-Administered Medications   Medication    aflibercept (EYLEA) injection prefilled syringe 2 mg    aflibercept (EYLEA) injection prefilled syringe 2 mg    dexamethasone (OZURDEX) intravitreal implant 0.7 mg    dexamethasone  (OZURDEX) intravitreal implant 0.7 mg    faricimab-svoa (VABYSMO) intravitreal injection 6 mg    faricimab-svoa (VABYSMO) intravitreal injection 6 mg    lidocaine (PF) (XYLOCAINE) injection 1 mL          Allergies   Allergen Reactions    Codeine Other (See Comments)     Cannot take due to liver  Cannot tolerate oral narcotics    Seasonal Allergies      Sneezing, coughing, runny and itchy eyes       Physical Exam  There were no vitals taken for this visit.  GENERAL: healthy, alert and no distress  RESP: no audible wheeze, cough, or visible cyanosis.  No visible retractions or increased work of breathing.  Able to speak fully in complete sentences.  PSYCH: mentation appears normal, affect normal/bright, judgement and insight intact, normal speech and appearance well-groomed  RESULTS  Lab Results   Component Value Date    A1C 6.3 (H) 06/14/2023    A1C 6.9 (H) 12/14/2022    A1C 6.0 (H) 01/10/2022    A1C 6.8 (H) 07/13/2021    A1C 6.0 (H) 12/30/2020    A1C 6.3 (H) 06/13/2020    A1C 6.6 (H) 08/08/2018    A1C 6.5 (H) 06/09/2017    A1C 7.8 (H) 10/25/2016    HEMOGLOBINA1 4.8 03/04/2020    HEMOGLOBINA1 5.1 12/02/2019    HEMOGLOBINA1 5.4 08/14/2019    HEMOGLOBINA1 6.1 (A) 02/11/2019    HEMOGLOBINA1 8.1 (A) 04/11/2018       TSH   Date Value Ref Range Status   04/25/2022 1.24 0.40 - 4.00 mU/L Final   10/04/2021 1.90 0.40 - 4.00 mU/L Final   01/28/2021 0.91 0.40 - 4.00 mU/L Final   01/24/2020 1.91 0.40 - 4.00 mU/L Final   08/08/2018 0.49 0.40 - 4.00 mU/L Final   02/08/2018 0.71 0.40 - 4.00 mU/L Final   06/09/2017 0.42 0.40 - 4.00 mU/L Final     T4 Free   Date Value Ref Range Status   08/08/2018 1.21 0.76 - 1.46 ng/dL Final       ALT   Date Value Ref Range Status   09/19/2023 18 0 - 70 U/L Final     Comment:     Reference intervals for this test were updated on 6/12/2023 to more accurately reflect our healthy population. There may be differences in the flagging of prior results with similar values performed with this method.  Interpretation of those prior results can be made in the context of the updated reference intervals.     09/07/2023 19 0 - 70 U/L Final     Comment:     Reference intervals for this test were updated on 6/12/2023 to more accurately reflect our healthy population. There may be differences in the flagging of prior results with similar values performed with this method. Interpretation of those prior results can be made in the context of the updated reference intervals.     06/28/2021 20 0 - 70 U/L Final   06/14/2021 21 0 - 70 U/L Final   ]    Recent Labs   Lab Test 07/28/23  1046 06/14/23  0924 12/14/22  0847 09/07/21  1025   CHOL 178 194   < > 176   HDL 34* 37*   < > 34*   LDL  --  78  --  95   TRIG 405* 394*   < > 233*    < > = values in this interval not displayed.       Lab Results   Component Value Date     09/19/2023     06/28/2021      Lab Results   Component Value Date    POTASSIUM 4.9 09/19/2023    POTASSIUM 7.7 08/10/2023    POTASSIUM 4.7 07/20/2022    POTASSIUM 4.6 06/28/2021     Lab Results   Component Value Date    CHLORIDE 102 09/19/2023    CHLORIDE 105 07/20/2022    CHLORIDE 107 06/28/2021     Lab Results   Component Value Date    DEBORAH 9.7 09/19/2023    DEBORAH 9.1 06/28/2021     Lab Results   Component Value Date    CO2 24 09/19/2023    CO2 26 07/20/2022    CO2 25 06/28/2021     Lab Results   Component Value Date    BUN 24.1 09/19/2023    BUN 32 07/20/2022    BUN 33 06/28/2021     Lab Results   Component Value Date    CR 1.35 09/19/2023    CR 1.62 06/28/2021       GFR Estimate   Date Value Ref Range Status   09/19/2023 60 (L) >60 mL/min/1.73m2 Final   09/07/2023 53 (L) >60 mL/min/1.73m2 Final   08/24/2023 46 (L) >60 mL/min/1.73m2 Final   06/28/2021 46 (L) >60 mL/min/[1.73_m2] Final     Comment:     Non  GFR Calc  Starting 12/18/2018, serum creatinine based estimated GFR (eGFR) will be   calculated using the Chronic Kidney Disease Epidemiology Collaboration   (CKD-EPI) equation.      06/14/2021 49 (L) >60 mL/min/[1.73_m2] Final     Comment:     Non  GFR Calc  Starting 12/18/2018, serum creatinine based estimated GFR (eGFR) will be   calculated using the Chronic Kidney Disease Epidemiology Collaboration   (CKD-EPI) equation.     06/04/2021 46 (L) >60 mL/min/[1.73_m2] Final     Comment:     Non  GFR Calc  Starting 12/18/2018, serum creatinine based estimated GFR (eGFR) will be   calculated using the Chronic Kidney Disease Epidemiology Collaboration   (CKD-EPI) equation.       GFR, ESTIMATED POCT   Date Value Ref Range Status   07/28/2023 28 (L) >60 mL/min/1.73m2 Final   06/14/2023 38 (L) >60 mL/min/1.73m2 Final   12/14/2022 40 (L) >60 mL/min/1.73m2 Final     GFR Estimate If Black   Date Value Ref Range Status   06/28/2021 54 (L) >60 mL/min/[1.73_m2] Final     Comment:      GFR Calc  Starting 12/18/2018, serum creatinine based estimated GFR (eGFR) will be   calculated using the Chronic Kidney Disease Epidemiology Collaboration   (CKD-EPI) equation.     06/14/2021 57 (L) >60 mL/min/[1.73_m2] Final     Comment:      GFR Calc  Starting 12/18/2018, serum creatinine based estimated GFR (eGFR) will be   calculated using the Chronic Kidney Disease Epidemiology Collaboration   (CKD-EPI) equation.     06/04/2021 53 (L) >60 mL/min/[1.73_m2] Final     Comment:      GFR Calc  Starting 12/18/2018, serum creatinine based estimated GFR (eGFR) will be   calculated using the Chronic Kidney Disease Epidemiology Collaboration   (CKD-EPI) equation.         Lab Results   Component Value Date    MICROL 924.0 09/07/2023    MICROL 47 04/20/2022    MICROL 563 02/26/2021     No results found for: MICROALBUMIN  No results found for: CPEPT, GADAB, ISCAB    Vitamin B12   Date Value Ref Range Status   08/15/2023 491 232 - 1,245 pg/mL Final   01/11/2022 2,179 (H) 193 - 986 pg/mL Final   06/13/2020 682 193 - 986 pg/mL Final   02/04/2019 3,006 (H)  "193 - 986 pg/mL Final   ]    Most recent eye exam date: : Not Found     Assessment/Plan:     1.  Type 2 diabetes- Doing well.  Glucose a bit variable, but he is correcting high glucose between meals and then dropping too low.  Advised him to only correct before each meal and bedtime.  We made the following plan today (instructions given to patient):        Check glucose before meals and bedtime.      Correction only before meals and bedtime.  NO correction between meals.     Continue 1/15g all day carb coverage--> if you find that you are still dropping low, we will relax your carb ratio.     Increase tresiba to 16 units.      Goal is to avoid dehydration.     Please let me know if you are having low blood sugars less than 70 or over 250 mg/dL.      If you have concerns, please send me a [employer] message M-Th, call the clinic at [phone], or call [phone] after hours/weekends and ask to speak with the endocrinologist on call.      2.  Risk factors-     Retinopathy:  Yes.  Had eye exam within last year.   Nephropathy:  BP historically well controlled.+ albuminuria.  Creatinine stable. GFR 46.    Neuropathy: Yes.   Feet: OK, no ulcers. +neuropathy \"Constricting feeling.\" Sees podiatry. Wears diabetic shoe.   Lipids:  TG were high at last check. Goal LDL <70. Patient taking rosuvastatin     3.  F/U in 3 months, sooner with concerns.    31 minutes spent on the date of the encounter doing chart review, review of test results, review and interpretation of glucose data, patient visit and documentation, counseling/coordination of care, and discussion of follow up plan for worsening hyper and hypoglycemia.  The patient understood and is satisfied with today's visit.          Frannie Vasquez PA-C, MPAS   [facility]  Department of Medicine  Division of Endocrinology and Diabetes          "

## 2023-09-26 ENCOUNTER — ALLIED HEALTH/NURSE VISIT (OUTPATIENT)
Dept: OTHER | Facility: CLINIC | Age: 58
End: 2023-09-26
Payer: MEDICARE

## 2023-09-26 VITALS
DIASTOLIC BLOOD PRESSURE: 76 MMHG | HEART RATE: 96 BPM | OXYGEN SATURATION: 99 % | SYSTOLIC BLOOD PRESSURE: 140 MMHG | TEMPERATURE: 97.6 F

## 2023-09-26 DIAGNOSIS — I10 BENIGN ESSENTIAL HYPERTENSION: Primary | ICD-10-CM

## 2023-09-26 DIAGNOSIS — E11.3593 PROLIFERATIVE DIABETIC RETINOPATHY OF BOTH EYES ASSOCIATED WITH TYPE 2 DIABETES MELLITUS, UNSPECIFIED PROLIFERATIVE RETINOPATHY TYPE (H): Primary | ICD-10-CM

## 2023-09-26 PROCEDURE — 99207 PR COMMUNITY PARAMEDIC - PATIENT NOT BILLABLE: CPT

## 2023-09-27 NOTE — NURSING NOTE
Chief Complaint   Patient presents with     RECHECK     Bergeron cirrhosis     Amita Erazo MA   Nutrition Assessment   Reason for Consult/Assessment: Follow up      Diagnosis and Hx: Reviewed    Pertinent Nutrition History: pmh includes but not limited to htn, cad, bladder mass, left pleural effusion s/p thoracentesis    Pertinent Nutrition Information: Appetite remains poor with oral intake of meals <25% of meals. Consumption of Ensure shakes is documented >50% of most meals.Patient is on room air. Per EMR, constipation with last BM on 922, edema present, partial thickness on lower posterior.                               Diet Order: Cardiac, Oral nutrition supplement/snacks         Nutrition supplement/snacks: When 6 bottles are consumed, Ensure Plus shakes will provide 2100 calories (106-124% of energy needs) and 120 grams of protein (121-153% of protein needs).        Diet tolerance: Not tolerating   Food Allergies: None known    Demographic/Anthropometrics Information  Gender: male  Patient Age: 64 year old  Height:   Ht Readings from Last 1 Encounters:   09/08/23 5' 10\" (1.778 m)      Weight:   Wt Readings from Last 1 Encounters:   09/27/23 65 kg      BMI:   BMI Readings from Last 1 Encounters:   09/27/23 20.56 kg/m²       Usual Weight: 73 kg  % Weight Change: 2% loss over the past 1 month  Weight change significant: No  Reason for weight change: Decreased intake     Estimated Needs:  Energy Needs:  2419-1548 kcals/day  Protein Needs: 78-99 grams/day  Calculated Fluid Needs: Per Provider            NFPE  Nutrition Focused Physical Exam  Physical Exam Completed: Yes  Body Fat  Overall Body Fat: Mild/Moderate  Muscle Mass  Overall Muscle Mass: Severe    TREATMENT PLAN: Monitoring & Interventions   Intervention: Meals and snacks         Meals & snacks: Honor food preferences appropriate to diet.          Goal: Tolerate oral diet   Intervention goal status: Some digression away from goal  Time frame to achieve goal: Ongoing     Dietitian will monitor: Biochemical data, medical tests, procedures, Food,  beverage, and nutrient intake            Nutrition Diagnosis / PES  Nutrition Diagnosis: Malnutrition  Malnutrition in the context of acute illness or injury: Severe  Related to: Decreased intake       Malnutrition diagnosis acute illness/injury; severe: Moderate depletion of body fat, Moderate to severe fluid accumulation (severe muscle wasting)  Primary Nutrition Diagnosis status: Active nutrition diagnosis

## 2023-09-27 NOTE — PROGRESS NOTES
[facility] Follow-up Visit  September 26, 2023  TIME: 1230    Frandy Workman is a 58 year old male being seen at home for a follow-up visit.    Present at appointment:           Chief Complaint   Patient presents with    Recheck Medication       Universal Utilization:      Utilization      Hospital Admissions  2             ED Visits  1             No Show Count (past year)  1                    Current as of: 9/27/2023  5:50 AM                BP (!) 140/76   Pulse 96   Temp 97.6  F (36.4  C)   SpO2 99%     Clinical Concerns:  Current Medical Concerns:  HTN, Diabetes    Current Behavioral Concerns: no    Education Provided to patient: no   Medication set up? Yes  Pill Box issued: No  Scale issued: No  Flu Shot given: No  COVID Vaccine Given: No  Lab draw or specimen collection: No  Food resource given: No  Collaborative visit with PCP: No  Wound Care: No  Health Maintenance Addressed Yes    Clinical Pathway: None    No  Face to Face in Home / Community      Review of Symptoms/PE    Skin: negative  Eyes: negative  Ears/Nose/Throat: negative  Respiratory: No shortness of breath, dyspnea on exertion, cough, or hemoptysis  Cardiovascular: negative  Gastrointestinal: negative  Genitourinary: negative  Musculoskeletal: negative  Neurologic: negative  Psychiatric: negative    Pain Management::                 Plan:     Time spent with patient: 60    The patient meets one or more of the following criteria:  * Requires services to prevent readmission to a nursing home or hospital      Next CP visit scheduled: 10/4/23    Issues for Provider to follow up on: [place] medications were set up for the next week.  We did call [facility], [phone] and began the process of getting his medications set up in pill packs.  We will call them at our next appointment to coordinate a start date.    Provider follow up visit needed: Multiple upcoming

## 2023-09-28 ENCOUNTER — MYC REFILL (OUTPATIENT)
Dept: FAMILY MEDICINE | Facility: CLINIC | Age: 58
End: 2023-09-28
Payer: MEDICARE

## 2023-09-28 DIAGNOSIS — C22.0 HCC (HEPATOCELLULAR CARCINOMA) (H): ICD-10-CM

## 2023-09-28 DIAGNOSIS — C78.6 MALIGNANT NEOPLASM METASTATIC TO PERITONEUM (H): ICD-10-CM

## 2023-09-28 RX ORDER — SORAFENIB 200 MG/1
200 TABLET, FILM COATED ORAL 2 TIMES DAILY
Qty: 120 TABLET | Refills: 0 | OUTPATIENT
Start: 2023-09-28

## 2023-09-28 RX ORDER — SORAFENIB 200 MG/1
200 TABLET, FILM COATED ORAL 2 TIMES DAILY
Qty: 60 TABLET | Refills: 0 | COMMUNITY
Start: 2023-09-21 | End: 2023-10-02

## 2023-09-29 RX ORDER — ONDANSETRON 8 MG/1
8 TABLET, ORALLY DISINTEGRATING ORAL EVERY 8 HOURS PRN
Qty: 15 TABLET | Refills: 1 | Status: SHIPPED | OUTPATIENT
Start: 2023-09-29 | End: 2023-10-06

## 2023-09-29 NOTE — TELEPHONE ENCOUNTER
"Pt asking for refill of zofran. Sent to [facility] per patient request.    Pt is very upset that bipolar is on his medical record and \"people keep stating I have it; this is wrong. You misdiagnosed with me. I am talking to my  about this.You cost me my daughter because of my bipolar misdiagnosis.\" \"I blew this off in the past, but I'm taking legal action on you all for what you have done. My  has already reached out to your medical  board.\"     Pt is nauseated, but not throwing up. He did utilize zofran. He is able to keep fluids down. Pt currently having egg whites at [facility].     Pt weighs 171 pounds today. He has been keeping track of his sleep, eating and fluid intake in his yuval. He knows he is down weight from chemo medication.    Message to supervisor     Pt very thankful for [facility] calls and following up with him.   "

## 2023-10-02 ENCOUNTER — TELEPHONE (OUTPATIENT)
Dept: NEPHROLOGY | Facility: CLINIC | Age: 58
End: 2023-10-02

## 2023-10-02 ENCOUNTER — VIRTUAL VISIT (OUTPATIENT)
Dept: ENDOCRINOLOGY | Facility: CLINIC | Age: 58
End: 2023-10-02
Payer: MEDICARE

## 2023-10-02 DIAGNOSIS — Z95.1 S/P CABG (CORONARY ARTERY BYPASS GRAFT): ICD-10-CM

## 2023-10-02 DIAGNOSIS — Z79.4 TYPE 2 DIABETES MELLITUS WITH STAGE 3A CHRONIC KIDNEY DISEASE, WITH LONG-TERM CURRENT USE OF INSULIN (H): Primary | ICD-10-CM

## 2023-10-02 DIAGNOSIS — N18.31 TYPE 2 DIABETES MELLITUS WITH STAGE 3A CHRONIC KIDNEY DISEASE, WITH LONG-TERM CURRENT USE OF INSULIN (H): Primary | ICD-10-CM

## 2023-10-02 DIAGNOSIS — E11.22 TYPE 2 DIABETES MELLITUS WITH STAGE 3A CHRONIC KIDNEY DISEASE, WITH LONG-TERM CURRENT USE OF INSULIN (H): Primary | ICD-10-CM

## 2023-10-02 DIAGNOSIS — I12.9 HYPERTENSION, RENAL: ICD-10-CM

## 2023-10-02 PROCEDURE — 99214 OFFICE O/P EST MOD 30 MIN: CPT | Mod: VID | Performed by: PHYSICIAN ASSISTANT

## 2023-10-02 RX ORDER — METOPROLOL SUCCINATE 25 MG/1
25 TABLET, EXTENDED RELEASE ORAL DAILY
Qty: 45 TABLET | Refills: 1 | Status: SHIPPED | OUTPATIENT
Start: 2023-10-02 | End: 2023-12-08

## 2023-10-02 RX ORDER — NIFEDIPINE 60 MG/1
60 TABLET, EXTENDED RELEASE ORAL 2 TIMES DAILY
Qty: 60 TABLET | Refills: 11 | Status: SHIPPED | OUTPATIENT
Start: 2023-10-02 | End: 2023-12-07

## 2023-10-02 ASSESSMENT — PAIN SCALES - GENERAL: PAINLEVEL: NO PAIN (0)

## 2023-10-02 NOTE — TELEPHONE ENCOUNTER
Date: 10/2/2023    Time of Call: 2:47 PM     Diagnosis:  HTN     [ VORB ] Ordering provider: Dr Eloy Rao  Order: restart and  increase metoprolol ER 25 daily and nifedipine 60 mg BID     Order received by: Lupe Owens LPN     Follow-up/additional notes:     Writer to follow up on BP    Lupe Owens LPN  Nephrology  [phone]

## 2023-10-02 NOTE — NURSING NOTE
Is the patient currently in the state of [place]? YES    Visit mode:VIDEO    If the visit is dropped, the patient can be reconnected by: VIDEO VISIT: Text to cell phone:   Telephone Information:   Mobile [phone]    and VIDEO VISIT: Send to e-mail at: [email or web address]    Will anyone else be joining the visit? NO  (If patient encounters technical issues they should call [phone]    How would you like to obtain your AVS? MyChart    Are changes needed to the allergy or medication list? No    Reason for visit: Follow Up    Marjan OLEARY

## 2023-10-02 NOTE — TELEPHONE ENCOUNTER
[facility] Call Center    Phone Message    May a detailed message be left on voicemail: yes     Reason for Call: Other: Pt returning call from clinic. Please call pt after 1:30pm today     Action Taken: Message routed to:  Other: Neph    Travel Screening: Not Applicable

## 2023-10-02 NOTE — LETTER
10/2/2023       RE: Frandy Workman  [address] [facility] [address]     Dear Colleague,    Thank you for referring your patient, Frandy Workman, to the [facility]. Please see a copy of my visit note below.    Outcome for 23 8:33 AM: Data uploaded on [employer]  Yojana Wang MA  Outcome for 23 8:36 AM: Data obtained via [employer] website  Oriana Dickinson LPN     Patient is showing 4/5 MNCM met. BP out range   Oriana Dickinson LPN    Start time: 1307  End time: 1330  Provider location: off site- home  Patient location: off site- home.        HPI:     Frandy Workman is a 58 year old man here for follow up of type 2 diabetes complicated by nephropathy, retinopathy and neuropathy. 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 Unfortunately, he recently found out he has new peritoneal metastatic disease and has started chemo.  Having some nausea, no vomiting.  Staying well hydrated.   Still walking a lot- about 5 miles a day.   He has been having some lows and has had to cut back on novolog.     Appetite is normal.  He is eating a bit less.      Had diarrhea this weekend. Basically took the approach that he was going to drink a lot of water- 320 oz Saturday and 360 oz yesterday. Immodium.  Blood pressure had been running very high and will be starting up metoprolol.      DM Regimen:  - Tresiba 15 units /day (less than half of previous dose).  - Carbohydrate coverage to 1 unit:15g of carbohydrate   - Correction Novolounit Novolo mg /dl >180    - Empagliflozin 10 mg daily (had JERONIMO/hypovolemias on higher dose)-  NOT TAKING- this was stopped since last hospitalization.  - Metformin- started to get diarrhea from it. 500 mg daily. NOT TAKING- this  was stopped since last hospitalization.    Patient wears a camilo 2 sensor with an overall average of 176 mg/dL (CV 29%, TIR 60%, hypo 0%, severe hypo 0%) over the past 2 weeks.   Recent glucose is as follows:                             Diabetes Control:   Lab Results   Component Value Date    A1C 6.0 01/10/2022    A1C 6.8 07/13/2021    A1C 6.0 12/30/2020    A1C 6.3 06/13/2020    A1C 6.6 08/08/2018    A1C 6.5 06/09/2017    A1C 7.8 10/25/2016       Past Medical History:   Diagnosis Date    Anemia 2013    Arthritis     BPH (benign prostatic hyperplasia)     CAD (coronary artery disease) 04/01/2019    Cholelithiasis     Conductive hearing loss 08/16/2017    Depressive disorder 1986    Suffer effects throughout life    Gastroesophageal reflux disease 12/01/2014    HCC (hepatocellular carcinoma) (H) 01/22/2019    History of diabetic retinopathy 07/2018    HTN (hypertension) 11/20/2019    Hyperlipidemia     Liver cirrhosis secondary to ESTRADA (H)     Liver transplanted (H) 11/11/2019    Portal vein thrombosis 04/11/2019    Type II diabetes mellitus (H)        Past Surgical History:   Procedure Laterality Date    BYPASS GRAFT ARTERY CORONARY N/A 7/14/2021    Procedure: median sternotomy, on cardiopulmonary bypass, CORONARY ARTERY BYPASS GRAFT (CABG) x2 with left greater saphenous vein endoscopic harvest and left internal mammery artery harvest;  Surgeon: Tom Zapata MD;  Location: [facility] OR    COLONOSCOPY      2015    COLONOSCOPY N/A 12/6/2019    Procedure: COLONOSCOPY, WITH POLYPECTOMY AND BIOPSY;  Surgeon: Adam Morton MD;  Location:  GI    CV CENTRAL VENOUS CATHETER PLACEMENT N/A 7/12/2021    Procedure: Central Venous Catheter Placement;  Surgeon: Fermin Polanco MD;  Location:  HEART CARDIAC CATH LAB    CV CORONARY ANGIOGRAM N/A 7/12/2021    Procedure: Coronary Angiogram;  Surgeon: Fermin Polanco MD;  Location:  HEART CARDIAC CATH LAB    CV HEART CATHETERIZATION WITH  POSSIBLE INTERVENTION N/A 2/26/2019    Procedure: CORS;  Surgeon: Jagdish Hoyt MD;  Location:  HEART CARDIAC CATH LAB    CV INTRA AORTIC BALLOON N/A 7/12/2021    Procedure: Intra Aortic Balloon Pump Insertion;  Surgeon: Fermin Polanco MD;  Location:  HEART CARDIAC CATH LAB    ESOPHAGOSCOPY, GASTROSCOPY, DUODENOSCOPY (EGD), COMBINED N/A 11/17/2016    Procedure: COMBINED ESOPHAGOSCOPY, GASTROSCOPY, DUODENOSCOPY (EGD);  Surgeon: Santi Rosas MD;  Location:  GI    ESOPHAGOSCOPY, GASTROSCOPY, DUODENOSCOPY (EGD), COMBINED N/A 11/17/2017    Procedure: COMBINED ESOPHAGOSCOPY, GASTROSCOPY, DUODENOSCOPY (EGD);  EGD;  Surgeon: Santi Rosas MD;  Location:  GI    ESOPHAGOSCOPY, GASTROSCOPY, DUODENOSCOPY (EGD), COMBINED N/A 12/28/2018    Procedure: EGD;  Surgeon: Santi Rosas MD;  Location:  OR    ESOPHAGOSCOPY, GASTROSCOPY, DUODENOSCOPY (EGD), COMBINED N/A 12/6/2019    Procedure: ESOPHAGOGASTRODUODENOSCOPY, WITH BIOPSY;  Surgeon: Adam Morton MD;  Location:  GI    ESOPHAGOSCOPY, GASTROSCOPY, DUODENOSCOPY (EGD), COMBINED N/A 2/13/2020    Procedure: ESOPHAGOGASTRODUODENOSCOPY (EGD);  Surgeon: Santi Rosas MD;  Location:  GI    EXCISE MASS ABDOMEN N/A 7/13/2023    Procedure: Laparoscopic lysis of adhesions, laparoscopic resection of abdominal mass;  Surgeon: Ruiz Chu MD;  Location:  OR    HEAD & NECK SURGERY      12/2017 at [facility].     IMPLANT GOLD WEIGHT EYELID Right 11/16/2017    Procedure: IMPLANT WEIGHT EYELID;  Right Upper Eyelid Weight, right tarsal strip lower eyelid;  Surgeon: Milana Malave MD;  Location: [facility] OR    IR CHEMO EMBOLIZATION  1/22/2019    KNEE SURGERY Left     ORTHOPEDIC SURGERY      PAROTIDECTOMY, RADICAL NECK DISSECTION Right 11/2/2017    Procedure: PAROTIDECTOMY, RADICAL NECK DISSECTION;  Right Superfacial Parotidectomy , Facial nerve repair. with [facility] facial nerve monitor.;  Surgeon: Asiya Morgan MD;   Location: [facility] OR    PHACOEMULSIFICATION CLEAR CORNEA WITH STANDARD INTRAOCULAR LENS IMPLANT Right 2023    Procedure: PHACOEMULSIFICATION, COMPLEX CATARACT, WITH INTRAOCULAR LENS IMPLANT WITH TRYPAN RIGHT EYE;  Surgeon: Enriqueta Martin MD;  Location: [facility] OR    PHACOEMULSIFICATION WITH STANDARD INTRAOCULAR LENS IMPLANT Left 1/3/2023    Procedure: PHACOEMULSIFICATION, COMPLEX CATARACT, WITH STANDARD INTRAOCULAR LENS IMPLANT INSERTION LEFT EYE;  Surgeon: Enriqueta Martin MD;  Location: [facility] OR    PICC INSERTION Left 2017    4fr SL BioFlo PICC, 44cm in the L basilic vein w/ tip in the low SVC    RETURN LIVER TRANSPLANT N/A 2019    Procedure: Exploratory laparotomy, hematoma evacuation, abdominal washout;  Surgeon: Александр Ramos MD;  Location: [facility] OR    TRANSPLANT LIVER RECIPIENT  DONOR N/A 2019    Procedure: TRANSPLANT, LIVER, RECIPIENT,  DONOR;  Surgeon: Александр Ramos MD;  Location: [facility] OR    VASCULAR SURGERY         Family History   Problem Relation Age of Onset    Skin Cancer Mother     Cancer Mother         mastectomy    Diabetes Mother     Cerebrovascular Disease Mother     Thyroid Disease Mother     Depression Mother     Colon Cancer Father 60    Pancreatic Cancer Father 60    Prostate Cancer Father     Macular Degeneration Father     Glaucoma Father     Skin Cancer Father     Thyroid Disease Sister     Depression Sister     Asthma Sister     Sleep Apnea Brother     Colorectal Cancer Maternal Grandmother     Cancer Maternal Grandmother     Substance Abuse Maternal Grandmother         Alcohol    Prostate Cancer Maternal Grandfather     Substance Abuse Maternal Grandfather         Alcohol    Colorectal Cancer Paternal Grandmother     Liver Disease No family hx of     Melanoma No family hx of     Anesthesia Reaction No family hx of     Deep Vein Thrombosis (DVT) No family hx of        Social History     Socioeconomic History    Marital status:      Highest education level: Bachelor's degree (e.g., BA, AB, BS)   Tobacco Use    Smoking status: Former     Packs/day: 6.00     Years: 30.00     Pack years: 180.00     Types: Cigars, Cigarettes     Start date: 2016     Quit date: 10/25/2017     Years since quittin.0    Smokeless tobacco: Former     Types: Chew     Quit date: 10/31/2017    Tobacco comments:     1 tin per week   Substance and Sexual Activity    Alcohol use: No     Alcohol/week: 0.0 standard drinks     Comment: quit 1996    Drug use: No    Sexual activity: Not Currently     Partners: Female     Birth control/protection: Condom   Other Topics Concern    Parent/sibling w/ CABG, MI or angioplasty before 65F 55M? Yes   Social History Narrative    Prior , [employer] [facility]     Social Determinants of Health     Intimate Partner Violence: Not At Risk    Fear of Current or Ex-Partner: No    Emotionally Abused: No    Physically Abused: No    Sexually Abused: No       Current Outpatient Medications   Medication    Alcohol Swabs (ALCOHOL PREP) 70 % PADS    ammonium lactate (AMLACTIN) 12 % external cream    aspirin (SM ASPIRIN ADULT LOW STRENGTH) 81 MG EC tablet    BD VIKTORIA U/F 32G X 4 MM insulin pen needle    benzoyl peroxide 5 % external liquid    blood glucose (NO BRAND SPECIFIED) lancets standard    Continuous Blood Gluc Sensor (FREESTYLE CLAUDIA 2 SENSOR) MISC    insulin aspart (NOVOLOG PEN) 100 UNIT/ML pen    insulin degludec (TRESIBA FLEXTOUCH) 100 UNIT/ML pen    ketoconazole (NIZORAL) 2 % external shampoo    lamiVUDine (EPIVIR) 100 MG tablet    metoprolol succinate ER (TOPROL XL) 25 MG 24 hr tablet    Multiple Vitamin (TAB-A-GLADIS) TABS    mycophenolate (GENERIC EQUIVALENT) 250 MG capsule    NIFEdipine ER OSMOTIC (PROCARDIA XL) 60 MG 24 hr tablet    ondansetron (ZOFRAN ODT) 8 MG ODT tab    pantoprazole (PROTONIX) 40 MG EC tablet    predniSONE (DELTASONE) 5 MG tablet    rosuvastatin (CRESTOR) 20 MG tablet    SORAfenib (NEXAVAR) 200 MG tablet     tamsulosin (FLOMAX) 0.4 MG capsule    Vitamin D3 50 mcg (2000 units) tablet     Current Facility-Administered Medications   Medication    aflibercept (EYLEA) injection prefilled syringe 2 mg    aflibercept (EYLEA) injection prefilled syringe 2 mg    dexamethasone (OZURDEX) intravitreal implant 0.7 mg    dexamethasone (OZURDEX) intravitreal implant 0.7 mg    faricimab-svoa (VABYSMO) intravitreal injection 6 mg    faricimab-svoa (VABYSMO) intravitreal injection 6 mg    lidocaine (PF) (XYLOCAINE) injection 1 mL          Allergies   Allergen Reactions    Codeine Other (See Comments)     Cannot take due to liver  Cannot tolerate oral narcotics    Seasonal Allergies      Sneezing, coughing, runny and itchy eyes       Physical Exam  There were no vitals taken for this visit.  GENERAL: healthy, alert and no distress  RESP: no audible wheeze, cough, or visible cyanosis.  No visible retractions or increased work of breathing.  Able to speak fully in complete sentences.  PSYCH: mentation appears normal, affect normal/bright, judgement and insight intact, normal speech and appearance well-groomed  RESULTS  Lab Results   Component Value Date    A1C 6.3 (H) 06/14/2023    A1C 6.9 (H) 12/14/2022    A1C 6.0 (H) 01/10/2022    A1C 6.8 (H) 07/13/2021    A1C 6.0 (H) 12/30/2020    A1C 6.3 (H) 06/13/2020    A1C 6.6 (H) 08/08/2018    A1C 6.5 (H) 06/09/2017    A1C 7.8 (H) 10/25/2016    HEMOGLOBINA1 4.8 03/04/2020    HEMOGLOBINA1 5.1 12/02/2019    HEMOGLOBINA1 5.4 08/14/2019    HEMOGLOBINA1 6.1 (A) 02/11/2019    HEMOGLOBINA1 8.1 (A) 04/11/2018       TSH   Date Value Ref Range Status   04/25/2022 1.24 0.40 - 4.00 mU/L Final   10/04/2021 1.90 0.40 - 4.00 mU/L Final   01/28/2021 0.91 0.40 - 4.00 mU/L Final   01/24/2020 1.91 0.40 - 4.00 mU/L Final   08/08/2018 0.49 0.40 - 4.00 mU/L Final   02/08/2018 0.71 0.40 - 4.00 mU/L Final   06/09/2017 0.42 0.40 - 4.00 mU/L Final     T4 Free   Date Value Ref Range Status   08/08/2018 1.21 0.76 - 1.46 ng/dL  Final       ALT   Date Value Ref Range Status   09/19/2023 18 0 - 70 U/L Final     Comment:     Reference intervals for this test were updated on 6/12/2023 to more accurately reflect our healthy population. There may be differences in the flagging of prior results with similar values performed with this method. Interpretation of those prior results can be made in the context of the updated reference intervals.     09/07/2023 19 0 - 70 U/L Final     Comment:     Reference intervals for this test were updated on 6/12/2023 to more accurately reflect our healthy population. There may be differences in the flagging of prior results with similar values performed with this method. Interpretation of those prior results can be made in the context of the updated reference intervals.     06/28/2021 20 0 - 70 U/L Final   06/14/2021 21 0 - 70 U/L Final   ]    Recent Labs   Lab Test 07/28/23  1046 06/14/23  0924 12/14/22  0847 09/07/21  1025   CHOL 178 194   < > 176   HDL 34* 37*   < > 34*   LDL  --  78  --  95   TRIG 405* 394*   < > 233*    < > = values in this interval not displayed.       Lab Results   Component Value Date     09/19/2023     06/28/2021      Lab Results   Component Value Date    POTASSIUM 4.9 09/19/2023    POTASSIUM 7.7 08/10/2023    POTASSIUM 4.7 07/20/2022    POTASSIUM 4.6 06/28/2021     Lab Results   Component Value Date    CHLORIDE 102 09/19/2023    CHLORIDE 105 07/20/2022    CHLORIDE 107 06/28/2021     Lab Results   Component Value Date    DEBORAH 9.7 09/19/2023    DEBORAH 9.1 06/28/2021     Lab Results   Component Value Date    CO2 24 09/19/2023    CO2 26 07/20/2022    CO2 25 06/28/2021     Lab Results   Component Value Date    BUN 24.1 09/19/2023    BUN 32 07/20/2022    BUN 33 06/28/2021     Lab Results   Component Value Date    CR 1.35 09/19/2023    CR 1.62 06/28/2021       GFR Estimate   Date Value Ref Range Status   09/19/2023 60 (L) >60 mL/min/1.73m2 Final   09/07/2023 53 (L) >60 mL/min/1.73m2  Final   08/24/2023 46 (L) >60 mL/min/1.73m2 Final   06/28/2021 46 (L) >60 mL/min/[1.73_m2] Final     Comment:     Non  GFR Calc  Starting 12/18/2018, serum creatinine based estimated GFR (eGFR) will be   calculated using the Chronic Kidney Disease Epidemiology Collaboration   (CKD-EPI) equation.     06/14/2021 49 (L) >60 mL/min/[1.73_m2] Final     Comment:     Non  GFR Calc  Starting 12/18/2018, serum creatinine based estimated GFR (eGFR) will be   calculated using the Chronic Kidney Disease Epidemiology Collaboration   (CKD-EPI) equation.     06/04/2021 46 (L) >60 mL/min/[1.73_m2] Final     Comment:     Non  GFR Calc  Starting 12/18/2018, serum creatinine based estimated GFR (eGFR) will be   calculated using the Chronic Kidney Disease Epidemiology Collaboration   (CKD-EPI) equation.       GFR, ESTIMATED POCT   Date Value Ref Range Status   07/28/2023 28 (L) >60 mL/min/1.73m2 Final   06/14/2023 38 (L) >60 mL/min/1.73m2 Final   12/14/2022 40 (L) >60 mL/min/1.73m2 Final     GFR Estimate If Black   Date Value Ref Range Status   06/28/2021 54 (L) >60 mL/min/[1.73_m2] Final     Comment:      GFR Calc  Starting 12/18/2018, serum creatinine based estimated GFR (eGFR) will be   calculated using the Chronic Kidney Disease Epidemiology Collaboration   (CKD-EPI) equation.     06/14/2021 57 (L) >60 mL/min/[1.73_m2] Final     Comment:      GFR Calc  Starting 12/18/2018, serum creatinine based estimated GFR (eGFR) will be   calculated using the Chronic Kidney Disease Epidemiology Collaboration   (CKD-EPI) equation.     06/04/2021 53 (L) >60 mL/min/[1.73_m2] Final     Comment:      GFR Calc  Starting 12/18/2018, serum creatinine based estimated GFR (eGFR) will be   calculated using the Chronic Kidney Disease Epidemiology Collaboration   (CKD-EPI) equation.         Lab Results   Component Value Date    MICROL 924.0 09/07/2023    MICROL  "47 04/20/2022    MICROL 563 02/26/2021     No results found for: MICROALBUMIN  No results found for: CPEPT, GADAB, ISCAB    Vitamin B12   Date Value Ref Range Status   08/15/2023 491 232 - 1,245 pg/mL Final   01/11/2022 2,179 (H) 193 - 986 pg/mL Final   06/13/2020 682 193 - 986 pg/mL Final   02/04/2019 3,006 (H) 193 - 986 pg/mL Final   ]    Most recent eye exam date: : Not Found     Assessment/Plan:     1.  Type 2 diabetes- Doing well.  Glucose a bit variable, but he is correcting high glucose between meals and then dropping too low.  Advised him to only correct before each meal and bedtime.  We made the following plan today (instructions given to patient):        Check glucose before meals and bedtime.      Correction only before meals and bedtime.  NO correction between meals.     Continue 1/15g all day carb coverage--> if you find that you are still dropping low, we will relax your carb ratio.     Increase tresiba to 16 units.      Goal is to avoid dehydration.     Please let me know if you are having low blood sugars less than 70 or over 250 mg/dL.      If you have concerns, please send me a Qoture message M-Th, call the clinic at [phone], or call [phone] after hours/weekends and ask to speak with the endocrinologist on call.      2.  Risk factors-     Retinopathy:  Yes.  Had eye exam within last year.   Nephropathy:  BP historically well controlled.+ albuminuria.  Creatinine stable. GFR 46.    Neuropathy: Yes.   Feet: OK, no ulcers. +neuropathy \"Constricting feeling.\" Sees podiatry. Wears diabetic shoe.   Lipids:  TG were high at last check. Goal LDL <70. Patient taking rosuvastatin     3.  F/U in 3 months, sooner with concerns.    31 minutes spent on the date of the encounter doing chart review, review of test results, review and interpretation of glucose data, patient visit and documentation, counseling/coordination of care, and discussion of follow up plan for worsening hyper and hypoglycemia.  " The patient understood and is satisfied with today's visit.          Frannie Vasquez PA-C, MPAS   [facility]  Department of Medicine  Division of Endocrinology and Diabetes            Virtual Visit Details

## 2023-10-02 NOTE — PATIENT INSTRUCTIONS
Check glucose before meals and bedtime.      Before meals and bedtime.  NO correction between meals.     Continue 1/15g all day carb coverage--> if you find that you are still dropping low, we will relax your carb ratio.     Increase tresiba to 16 units.      Goal is to avoid dehydration.     Please let me know if you are having low blood sugars less than 70 or over 250 mg/dL.      If you have concerns, please send me a War[employer] message M-Th, call the clinic at [phone], or call [phone] after hours/weekends and ask to speak with the endocrinologist on call.

## 2023-10-03 DIAGNOSIS — N18.31 CHRONIC KIDNEY DISEASE, STAGE 3A (H): ICD-10-CM

## 2023-10-03 RX ORDER — MULTIVITAMIN WITH FOLIC ACID 400 MCG
TABLET ORAL
Qty: 90 TABLET | Refills: 0 | Status: SHIPPED | OUTPATIENT
Start: 2023-10-03 | End: 2023-10-05

## 2023-10-04 ENCOUNTER — OFFICE VISIT (OUTPATIENT)
Dept: OPHTHALMOLOGY | Facility: CLINIC | Age: 58
End: 2023-10-04
Attending: OPHTHALMOLOGY
Payer: MEDICARE

## 2023-10-04 ENCOUNTER — THERAPY VISIT (OUTPATIENT)
Dept: SLEEP MEDICINE | Facility: CLINIC | Age: 58
End: 2023-10-04
Attending: INTERNAL MEDICINE
Payer: MEDICARE

## 2023-10-04 ENCOUNTER — ALLIED HEALTH/NURSE VISIT (OUTPATIENT)
Dept: OTHER | Facility: CLINIC | Age: 58
End: 2023-10-04
Payer: MEDICARE

## 2023-10-04 VITALS
SYSTOLIC BLOOD PRESSURE: 132 MMHG | TEMPERATURE: 97.5 F | HEART RATE: 95 BPM | DIASTOLIC BLOOD PRESSURE: 72 MMHG | OXYGEN SATURATION: 97 %

## 2023-10-04 DIAGNOSIS — R35.1 NOCTURIA: ICD-10-CM

## 2023-10-04 DIAGNOSIS — E08.3213: Primary | ICD-10-CM

## 2023-10-04 DIAGNOSIS — Z91.89 RISK FACTORS FOR OBSTRUCTIVE SLEEP APNEA: ICD-10-CM

## 2023-10-04 DIAGNOSIS — R53.83 FATIGUE, UNSPECIFIED TYPE: ICD-10-CM

## 2023-10-04 DIAGNOSIS — R11.2 NAUSEA AND VOMITING, UNSPECIFIED VOMITING TYPE: ICD-10-CM

## 2023-10-04 DIAGNOSIS — G47.33 OSA (OBSTRUCTIVE SLEEP APNEA): Primary | ICD-10-CM

## 2023-10-04 DIAGNOSIS — I10 BENIGN ESSENTIAL HYPERTENSION: Primary | ICD-10-CM

## 2023-10-04 DIAGNOSIS — N18.31 CHRONIC KIDNEY DISEASE, STAGE 3A (H): ICD-10-CM

## 2023-10-04 DIAGNOSIS — E11.3593 PROLIFERATIVE DIABETIC RETINOPATHY OF BOTH EYES ASSOCIATED WITH TYPE 2 DIABETES MELLITUS, UNSPECIFIED PROLIFERATIVE RETINOPATHY TYPE (H): ICD-10-CM

## 2023-10-04 PROCEDURE — 92134 CPTRZ OPH DX IMG PST SGM RTA: CPT | Performed by: OPHTHALMOLOGY

## 2023-10-04 PROCEDURE — 67028 INJECTION EYE DRUG: CPT | Mod: RT | Performed by: OPHTHALMOLOGY

## 2023-10-04 PROCEDURE — 99214 OFFICE O/P EST MOD 30 MIN: CPT | Mod: 25 | Performed by: OPHTHALMOLOGY

## 2023-10-04 PROCEDURE — 250N000011 HC RX IP 250 OP 636: Mod: JZ | Performed by: OPHTHALMOLOGY

## 2023-10-04 PROCEDURE — G0463 HOSPITAL OUTPT CLINIC VISIT: HCPCS | Mod: 25 | Performed by: OPHTHALMOLOGY

## 2023-10-04 PROCEDURE — 99207 PR COMMUNITY PARAMEDIC - PATIENT NOT BILLABLE: CPT

## 2023-10-04 PROCEDURE — 95810 POLYSOM 6/> YRS 4/> PARAM: CPT | Performed by: INTERNAL MEDICINE

## 2023-10-04 RX ORDER — SORAFENIB 200 MG/1
TABLET, FILM COATED ORAL
Qty: 120 TABLET | OUTPATIENT
Start: 2023-10-04

## 2023-10-04 RX ORDER — ONDANSETRON 8 MG/1
TABLET, ORALLY DISINTEGRATING ORAL
Qty: 15 TABLET | Refills: 1 | OUTPATIENT
Start: 2023-10-04

## 2023-10-04 RX ORDER — MULTIVITAMIN WITH FOLIC ACID 400 MCG
1 TABLET ORAL DAILY
Qty: 30 TABLET | OUTPATIENT
Start: 2023-10-04

## 2023-10-04 RX ADMIN — FARICIMAB 6 MG: 6 INJECTION, SOLUTION INTRAVITREAL at 09:49

## 2023-10-04 ASSESSMENT — TONOMETRY
OS_IOP_MMHG: 19
OD_IOP_MMHG: 18
IOP_METHOD: ICARE

## 2023-10-04 ASSESSMENT — SLEEP AND FATIGUE QUESTIONNAIRES
HOW LIKELY ARE YOU TO NOD OFF OR FALL ASLEEP WHILE SITTING INACTIVE IN A PUBLIC PLACE: SLIGHT CHANCE OF DOZING
HOW LIKELY ARE YOU TO NOD OFF OR FALL ASLEEP WHILE SITTING AND READING: SLIGHT CHANCE OF DOZING
HOW LIKELY ARE YOU TO NOD OFF OR FALL ASLEEP WHILE SITTING QUIETLY AFTER LUNCH WITHOUT ALCOHOL: SLIGHT CHANCE OF DOZING
HOW LIKELY ARE YOU TO NOD OFF OR FALL ASLEEP WHILE SITTING AND TALKING TO SOMEONE: WOULD NEVER DOZE
HOW LIKELY ARE YOU TO NOD OFF OR FALL ASLEEP WHILE LYING DOWN TO REST IN THE AFTERNOON WHEN CIRCUMSTANCES PERMIT: MODERATE CHANCE OF DOZING
HOW LIKELY ARE YOU TO NOD OFF OR FALL ASLEEP WHEN YOU ARE A PASSENGER IN A CAR FOR AN HOUR WITHOUT A BREAK: SLIGHT CHANCE OF DOZING
HOW LIKELY ARE YOU TO NOD OFF OR FALL ASLEEP IN A CAR, WHILE STOPPED FOR A FEW MINUTES IN TRAFFIC: WOULD NEVER DOZE
HOW LIKELY ARE YOU TO NOD OFF OR FALL ASLEEP WHILE WATCHING TV: MODERATE CHANCE OF DOZING

## 2023-10-04 ASSESSMENT — REFRACTION_WEARINGRX
OS_ADD: +2.75
OS_CYLINDER: +0.25
OD_CYLINDER: +0.50
OS_SPHERE: -0.50
OD_ADD: +2.75
OS_AXIS: 080
OD_SPHERE: -0.50
OD_AXIS: 180

## 2023-10-04 ASSESSMENT — VISUAL ACUITY
CORRECTION_TYPE: GLASSES
OD_CC: 20/20
OS_CC: 20/25
METHOD: SNELLEN - LINEAR
OD_CC+: -3
OS_CC+: -2+1

## 2023-10-04 ASSESSMENT — EXTERNAL EXAM - RIGHT EYE: OD_EXAM: NORMAL

## 2023-10-04 ASSESSMENT — EXTERNAL EXAM - LEFT EYE: OS_EXAM: PERIOCULAR ECCHYMOSIS

## 2023-10-04 ASSESSMENT — SLIT LAMP EXAM - LIDS
COMMENTS: SMALL PAPILLOMA ALONG LL MARGIN, NON CONCERNING
COMMENTS: NORMAL

## 2023-10-04 NOTE — NURSING NOTE
Chief Complaints and History of Present Illnesses   Patient presents with    Follow Up     3 week follow up     Chief Complaint(s) and History of Present Illness(es)       Follow Up              Associated symptoms: Negative for eye pain, flashes and floaters    Comments: 3 week follow up              Comments    Pt states he got progressive lenses and really likes them. Pt states his eyes feel very dry and is interested in prescription drops. Pt states no pain, flashes, or new floaters.   Pt states only Bauch and Lomb drops being used    Lab Results       Component                Value               Date                       A1C                      6.3                 06/14/2023                 A1C                      6.9                 12/14/2022                 A1C                      6.0                 01/10/2022                 A1C                      6.8                 07/13/2021                 A1C                      6.0                 12/30/2020                 A1C                      6.3                 06/13/2020            Jillian DASH 9:04 AM October 4, 2023

## 2023-10-04 NOTE — PROGRESS NOTES
Community Paramedics Follow-up Visit  October 4, 2023  TIME: 1300    Frandy Workman is a 58 year old male being seen at home for a follow-up visit.    Present at appointment:           Chief Complaint   Patient presents with    Community Paramedic-Home Visit    Recheck Medication       Universal Utilization:      Utilization      Hospital Admissions  2             ED Visits  1             No Show Count (past year)  1                    Current as of: 10/3/2023 12:49 PM                /72   Pulse 95   Temp 97.5  F (36.4  C)   SpO2 97%     Clinical Concerns:  Current Medical Concerns:  HTN, Diabetes    Current Behavioral Concerns: compliance with care plan    Education Provided to patient: no   Medication set up? Yes  Pill Box issued: No  Scale issued: No  Flu Shot given: No  COVID Vaccine Given: No  Lab draw or specimen collection: No  Food resource given: No  Collaborative visit with PCP: No  Wound Care: No  Health Maintenance Addressed No    Clinical Pathway: None    No  Face to Face in Home / Community      Review of Symptoms/PE    Skin: negative  Eyes: negative  Ears/Nose/Throat: negative  Respiratory: No shortness of breath, dyspnea on exertion, cough, or hemoptysis  Cardiovascular: negative  Gastrointestinal: negative  Genitourinary: negative  Musculoskeletal: negative  Neurologic: negative  Psychiatric: negative    Pain Management::                 Plan:     Time spent with patient: 60    The patient meets one or more of the following criteria:  * Requires services to prevent readmission to a nursing home or hospital      Next  visit scheduled: 10/12/23    Issues for Provider to follow up on:   We spoke to Juan at [facility], [phone], to check the status of medication list that is able to be set up in pill packs.  The Pantoprazole, Tamsulosin, Asa and Rosuvastatin will be set up in the packs beginning on 4/11/23.  The Metoprolol and Nifedipine ER will be set up in pill boxes for the next  month due to possible changes.  He will continue to set up his transplant medications and feels confident in doing so.      Provider follow up visit needed: Multiple upcoming

## 2023-10-04 NOTE — PROGRESS NOTES
CC:  Follow up Diabetic retinopathy     Interval:  Here for follow-up of Type 2 diabetes mellitus with PDR both eyes and macular edema. Possibly inj    Review of systems: started chemo for GI Ca. Was in the ICU because of  kidney failure- emergent dialysis  He has had history of sinus issues worsening this year with allergy season. VA subjectively unchanged, no flashes, no floaters. Last visit he received a left eye injection only.  He is working on walking more which is helping with weight loss.     Lab Results     A1C                      6.3                 6/14/2023       HPI: Frandy Workman is a 58 year old patient status post Cataract extraction intraocular lens left eye   history of Diabetes mellitus for 24 ys . Patient on insulin.    Interval History: s/p CEIOL left eye 1/3/22    Retinal Imaging:  OCT 10/04/23  RE: Continued resolution of central DME with trace noncentral inferior INTRARETINAL FLUID; poor scan quality superiorly s/s to pt cooperation (sleepy per imager)   LE: No IRF/SRF, stable. Mild Epiretinal membrane     fluorescein angiography 09/13/23  both eyes normal AV and choroidal filling ou. Staining laser scars. No obvious NVE, mild late macula leakage. peripheral leakage and capillary non perfusion.     Optos consistent with clinical exam    ASSESSMENT:     #T2DM   - HbA1c 6.3% (6/2023)  - Blood pressure (<120/80) and blood glucose (HbA1c <7.0, ~6.5 today) control discussed with patient. Patient advised that failure to adequately control each may lead to vision loss. The patient expressed understanding.    # Proliferative diabetic retinopathy left eye; pre-proliferative diabetic retinopathy right eye    # vitreous hemorrhage left eye 10/12/22   Status post Eylea inj left eye   Status post Panretinal laser photocoagulation (PRP) 9.28.22 left eye and Status post scatter PRP right eye 11/02/22     # Diabetic macular edema both eyes   - status post avastin in both eyes; Switch to Eylea 4/24/19  with improvement of Diabetic macular edema     - Tried holding off on injection 3/29/23, but edema worsened both eyes  - last Eylea right eye; 8/02/23 interval improvement 09/13/23 (9 weeks prior)  -  discussed with to T&E vs closed observation on 9/13/23, He preferred to observe   -Given mild worsening 10/04/23 will inj Vabysmo (10/04/23 #1)  Risks, benefits and alternatives discussed with patient and agreed to proceed  consider ozurdex in the future    # status post Cataract extraction intraocular lens both eyes   Right eye: 01/24/23; left eye 01/3/23  -IOP WNL today  Retina detachment and endophthalmitis precautions were discussed with the patient (increased blurry vision, drainage, new flashes, floaters or a curtain in the visual field) and was asked to return if any of the those occur    # Dry eye syndrome   Left eye worse than right eye   warm compresses and artificial tears  As needed  - punctal plugs previously left eye - reports this is better     # Status post liver transplant  - tx 11/2019  - severe anemia; s/p transfusion - anemia much improved   - being seen by his primary team      PLAN:  - Follow up in 6-8 weeks with Optical Coherence Tomography, no dilation      ~~~~~~~~~~~~~~~~~~~~~~~~~~~~~~~~~~   Complete documentation of historical and exam elements from today's encounter can be found in the full encounter summary report (not reduplicated in this progress note).  I personally obtained the chief complaint(s) and history of present illness.  I confirmed and edited as necessary the review of systems, past medical/surgical history, family history, social history, and examination findings as documented by others; and I examined the patient myself.  I personally reviewed the relevant tests, images, and reports as documented above.  I formulated and edited as necessary the assessment and plan and discussed the findings and management plan with the patient and family and No resident or fellow assisted  with the procedures performed.  I performed the procedures myself.    Enriqueta Martin MD   of Ophthalmology.  Retina Service   Department of Ophthalmology and Visual Neurosciences   [facility]  Phone: [phone]   Fax: [phone]

## 2023-10-05 ENCOUNTER — LAB (OUTPATIENT)
Dept: LAB | Facility: CLINIC | Age: 58
End: 2023-10-05
Payer: MEDICARE

## 2023-10-05 DIAGNOSIS — C22.0 HCC (HEPATOCELLULAR CARCINOMA) (H): ICD-10-CM

## 2023-10-05 DIAGNOSIS — N18.31 CHRONIC KIDNEY DISEASE, STAGE 3A (H): ICD-10-CM

## 2023-10-05 DIAGNOSIS — Z79.899 ENCOUNTER FOR LONG-TERM (CURRENT) USE OF MEDICATIONS: ICD-10-CM

## 2023-10-05 DIAGNOSIS — F31.0 BIPOLAR AFFECTIVE DISORDER, CURRENT EPISODE HYPOMANIC (H): ICD-10-CM

## 2023-10-05 DIAGNOSIS — C78.6 MALIGNANT NEOPLASM METASTATIC TO PERITONEUM (H): ICD-10-CM

## 2023-10-05 DIAGNOSIS — Z94.4 LIVER REPLACED BY TRANSPLANT (H): ICD-10-CM

## 2023-10-05 DIAGNOSIS — Z94.4 LIVER TRANSPLANT RECIPIENT (H): ICD-10-CM

## 2023-10-05 LAB
ALBUMIN SERPL BCG-MCNC: 4.5 G/DL (ref 3.5–5.2)
ALBUMIN SERPL BCG-MCNC: 4.5 G/DL (ref 3.5–5.2)
ALP SERPL-CCNC: 101 U/L (ref 40–129)
ALP SERPL-CCNC: 102 U/L (ref 40–129)
ALT SERPL W P-5'-P-CCNC: 21 U/L (ref 0–70)
ALT SERPL W P-5'-P-CCNC: 22 U/L (ref 0–70)
ANION GAP SERPL CALCULATED.3IONS-SCNC: 11 MMOL/L (ref 7–15)
ANION GAP SERPL CALCULATED.3IONS-SCNC: 12 MMOL/L (ref 7–15)
AST SERPL W P-5'-P-CCNC: 21 U/L (ref 0–45)
AST SERPL W P-5'-P-CCNC: 21 U/L (ref 0–45)
BASO+EOS+MONOS # BLD AUTO: ABNORMAL 10*3/UL
BASO+EOS+MONOS NFR BLD AUTO: ABNORMAL %
BASOPHILS # BLD AUTO: 0 10E3/UL (ref 0–0.2)
BASOPHILS NFR BLD AUTO: 0 %
BILIRUB DIRECT SERPL-MCNC: <0.2 MG/DL (ref 0–0.3)
BILIRUB SERPL-MCNC: 0.5 MG/DL
BILIRUB SERPL-MCNC: 0.5 MG/DL
BUN SERPL-MCNC: 26.5 MG/DL (ref 8–23)
BUN SERPL-MCNC: 26.7 MG/DL (ref 8–23)
CALCIUM SERPL-MCNC: 10.2 MG/DL (ref 8.6–10)
CALCIUM SERPL-MCNC: 10.3 MG/DL (ref 8.6–10)
CHLORIDE SERPL-SCNC: 101 MMOL/L (ref 98–107)
CHLORIDE SERPL-SCNC: 101 MMOL/L (ref 98–107)
CREAT SERPL-MCNC: 1.35 MG/DL (ref 0.67–1.17)
CREAT SERPL-MCNC: 1.36 MG/DL (ref 0.67–1.17)
DEPRECATED HCO3 PLAS-SCNC: 24 MMOL/L (ref 22–29)
DEPRECATED HCO3 PLAS-SCNC: 25 MMOL/L (ref 22–29)
EGFRCR SERPLBLD CKD-EPI 2021: 60 ML/MIN/1.73M2
EGFRCR SERPLBLD CKD-EPI 2021: 60 ML/MIN/1.73M2
EOSINOPHIL # BLD AUTO: 0 10E3/UL (ref 0–0.7)
EOSINOPHIL NFR BLD AUTO: 1 %
ERYTHROCYTE [DISTWIDTH] IN BLOOD BY AUTOMATED COUNT: 15.6 % (ref 10–15)
GLUCOSE SERPL-MCNC: 169 MG/DL (ref 70–99)
GLUCOSE SERPL-MCNC: 169 MG/DL (ref 70–99)
HCT VFR BLD AUTO: 34.5 % (ref 40–53)
HGB BLD-MCNC: 10.9 G/DL (ref 13.3–17.7)
IMM GRANULOCYTES # BLD: 0 10E3/UL
IMM GRANULOCYTES NFR BLD: 0 %
LYMPHOCYTES # BLD AUTO: 0.7 10E3/UL (ref 0.8–5.3)
LYMPHOCYTES NFR BLD AUTO: 15 %
MAGNESIUM SERPL-MCNC: 1.9 MG/DL (ref 1.7–2.3)
MCH RBC QN AUTO: 31.2 PG (ref 26.5–33)
MCHC RBC AUTO-ENTMCNC: 31.6 G/DL (ref 31.5–36.5)
MCV RBC AUTO: 99 FL (ref 78–100)
MONOCYTES # BLD AUTO: 0.3 10E3/UL (ref 0–1.3)
MONOCYTES NFR BLD AUTO: 7 %
NEUTROPHILS # BLD AUTO: 3.5 10E3/UL (ref 1.6–8.3)
NEUTROPHILS NFR BLD AUTO: 77 %
NRBC # BLD AUTO: 0 10E3/UL
NRBC BLD AUTO-RTO: 0 /100
PHOSPHATE SERPL-MCNC: 2.8 MG/DL (ref 2.5–4.5)
PLATELET # BLD AUTO: 180 10E3/UL (ref 150–450)
POTASSIUM SERPL-SCNC: 4.6 MMOL/L (ref 3.4–5.3)
POTASSIUM SERPL-SCNC: 4.7 MMOL/L (ref 3.4–5.3)
PROT SERPL-MCNC: 7.1 G/DL (ref 6.4–8.3)
PROT SERPL-MCNC: 7.2 G/DL (ref 6.4–8.3)
RBC # BLD AUTO: 3.49 10E6/UL (ref 4.4–5.9)
SODIUM SERPL-SCNC: 137 MMOL/L (ref 135–145)
SODIUM SERPL-SCNC: 137 MMOL/L (ref 135–145)
WBC # BLD AUTO: 4.6 10E3/UL (ref 4–11)

## 2023-10-05 PROCEDURE — 84155 ASSAY OF PROTEIN SERUM: CPT | Performed by: PATHOLOGY

## 2023-10-05 PROCEDURE — 36415 COLL VENOUS BLD VENIPUNCTURE: CPT | Performed by: PATHOLOGY

## 2023-10-05 PROCEDURE — 84460 ALANINE AMINO (ALT) (SGPT): CPT | Performed by: PATHOLOGY

## 2023-10-05 PROCEDURE — 85025 COMPLETE CBC W/AUTO DIFF WBC: CPT | Performed by: PATHOLOGY

## 2023-10-05 PROCEDURE — 83735 ASSAY OF MAGNESIUM: CPT | Performed by: PATHOLOGY

## 2023-10-05 PROCEDURE — 84075 ASSAY ALKALINE PHOSPHATASE: CPT | Performed by: PATHOLOGY

## 2023-10-05 PROCEDURE — 82247 BILIRUBIN TOTAL: CPT | Performed by: PATHOLOGY

## 2023-10-05 PROCEDURE — 84450 TRANSFERASE (AST) (SGOT): CPT | Performed by: PATHOLOGY

## 2023-10-05 RX ORDER — MULTIVITAMIN WITH FOLIC ACID 400 MCG
TABLET ORAL
Qty: 90 TABLET | Refills: 0 | Status: SHIPPED | OUTPATIENT
Start: 2023-10-05 | End: 2024-01-18

## 2023-10-05 NOTE — PATIENT INSTRUCTIONS
[place] [facility]    1. Your sleep study will be reviewed by a sleep physician within the next few days.     2. Please follow up in the sleep clinic as scheduled, or, make an appointment with your sleep provider to be seen within two weeks to discuss the results of the sleep study.    3. If you have any questions or problems with your treatment plan, please contact your sleep clinic provider at [phone] to further manage your condition.    4. Please review your attached medication list, and, at your follow-up appointment advise your sleep clinic provider about any changes.    5. Go to [email or web address] for more information about your sleep problems.    ZEINAB Schuster  October 5, 2023

## 2023-10-05 NOTE — NURSING NOTE
Diagnostic PSG completed per provider order.  Patient did not meet criteria for PAP therapy per provider order.

## 2023-10-06 ENCOUNTER — VIRTUAL VISIT (OUTPATIENT)
Dept: ONCOLOGY | Facility: CLINIC | Age: 58
End: 2023-10-06
Attending: NURSE PRACTITIONER
Payer: MEDICARE

## 2023-10-06 ENCOUNTER — DOCUMENTATION ONLY (OUTPATIENT)
Dept: FAMILY MEDICINE | Facility: CLINIC | Age: 58
End: 2023-10-06

## 2023-10-06 VITALS — HEIGHT: 69 IN | WEIGHT: 172 LBS | BODY MASS INDEX: 25.48 KG/M2

## 2023-10-06 DIAGNOSIS — R19.7 DIARRHEA, UNSPECIFIED TYPE: ICD-10-CM

## 2023-10-06 DIAGNOSIS — C78.6 MALIGNANT NEOPLASM METASTATIC TO PERITONEUM (H): ICD-10-CM

## 2023-10-06 DIAGNOSIS — C22.0 HCC (HEPATOCELLULAR CARCINOMA) (H): Primary | ICD-10-CM

## 2023-10-06 DIAGNOSIS — R11.2 NAUSEA AND VOMITING, UNSPECIFIED VOMITING TYPE: ICD-10-CM

## 2023-10-06 PROCEDURE — 99214 OFFICE O/P EST MOD 30 MIN: CPT | Mod: 95 | Performed by: NURSE PRACTITIONER

## 2023-10-06 RX ORDER — SORAFENIB 200 MG/1
TABLET, FILM COATED ORAL
Qty: 120 TABLET | Refills: 0 | Status: SHIPPED | OUTPATIENT
Start: 2023-10-06 | End: 2023-11-01

## 2023-10-06 RX ORDER — ONDANSETRON 8 MG/1
8 TABLET, ORALLY DISINTEGRATING ORAL EVERY 8 HOURS PRN
Qty: 30 TABLET | Refills: 3 | Status: SHIPPED | OUTPATIENT
Start: 2023-10-06 | End: 2023-10-24

## 2023-10-06 RX ORDER — LOPERAMIDE HYDROCHLORIDE 2 MG/1
2-4 TABLET ORAL 4 TIMES DAILY PRN
Qty: 120 TABLET | Refills: 3 | Status: SHIPPED | OUTPATIENT
Start: 2023-10-06 | End: 2024-05-13

## 2023-10-06 ASSESSMENT — PAIN SCALES - GENERAL: PAINLEVEL: SEVERE PAIN (6)

## 2023-10-06 NOTE — PROGRESS NOTES
Type of Form Received: [place] department of  services    Form Received (Date) 10/3/23 Mychart   Form Filled out Yes, 10/4 mailed to pt 10/6    Placed in provider folder Yes

## 2023-10-06 NOTE — NURSING NOTE
Is the patient currently in the state of [place]? YES    Visit mode:VIDEO    If the visit is dropped, the patient can be reconnected by: VIDEO VISIT: Send to e-mail at: [email or web address]    Will anyone else be joining the visit? YES: How would they like to receive their invitation? Send to e-mail: [email or web address]  (If patient encounters technical issues they should call [phone])    How would you like to obtain your AVS? MyChart    Are changes needed to the allergy or medication list? Pt stated no changes to allergies and Pt stated no med changes    Reason for visit: RECHECK    Kiana OLEARY

## 2023-10-06 NOTE — PROGRESS NOTES
Virtual Visit Details    Type of service:  Video Visit   Video Start Time:  234  Video End Time:3:11 PM    Originating Location (pt. Location): Home    Distant Location (provider location):  On-site  Platform used for Video Visit: Odette      Reason for Visit: seen in follow-up of recurrent hepatocellular carcinoma    Oncology HPI:   Miller Workman is a 58 year old man with a PMH of DMII, bipolar 1 disorder, depressed with LIZZETTE, cirrhosis s/t ESTRADA with subsequent development of HCC, s/p liver transplant , HLD, HTN, GERD, BPH and CAD with hx of 2 vessel CABG in July 2021, CKD with anemia of chronic disease, on erythropoetin.      He as diagnosed with hepatocellular carcinoma in December 2018 when he was found to have 2 LIRADS 5 lesions on surveillance imaging. He underwent TACE on 1/22/19.  He is s/p  liver transplant on 11/11/2019. The explanted liver had 2 lesions that were mostly necrotic and less than 3 cm with no clearly identifiable vascular invasion.      He was found to have peritoneal masses on routine surveillance imaging in June 2023. He underwent laparoscopy with biopsies and pericecal mass resection confirming metastatic HCC.     He met with Dr. Villarreal in July with recommendation to start sorafenib. He started 400 mg BID on 7/29/23.     He was admitted 8/10-8/16 with hyperkalemia, acute kidney injury. He required one round of hemodialysis. He was noted to have thrombocytopenia with a platelet count from 178 t9 97. A HIT VAHID was negative.   Renal function and hyperkalemia improved through the course of hospitalization. He was followed by nephrology. He was drinking 1.6 L of fluid.  Jardiance and lisinopril held. The cause of the JERONIMO was in the context of severe nausea and vomiting and diarrhea prior to admission.      He resumed sorafenib 200 mg/day on 9/12/23.  He had repeat CT imaging when he saw  on 9/21/23 that was stable. He was recommended to continue sorafenib, the dose was increased to 200  mg bid.    Interval history: Miller is doing pretty well. Mostly has been having difficulty with hyperglycemia and high blood pressure. Talked with both endocrine and nephrology this week with adjustment of medications.  Glucoses are <200.   Diarrhea last weekend, he treated this with 350 ounces of water/day for 3 days. He also called GI and was recommended to start imodium. He did and the diarrhea has resolved. Had some nausea as well, but that resolved with zofran  -has ongoing cramping in the thighs, this pre-dates the start of sorafenib. Seeing a chiropracter helps.  -weight loss has stabilized, hopes to stay this weight. Tolerating food ok.    -no hand/foot tenderness, mouth sores or hair/nail changes.      Current Outpatient Medications   Medication Sig Dispense Refill    Alcohol Swabs (ALCOHOL PREP) 70 % PADS Use for glucose testing 4x daily  and insulin administration 4x daily. 400 each 1    ammonium lactate (AMLACTIN) 12 % external cream Apply topically daily as needed for dry skin (on feet) 140 g 5    aspirin (SM ASPIRIN ADULT LOW STRENGTH) 81 MG EC tablet Take 2 tablets (162 mg) by mouth daily 180 tablet 3    BD VIKTORIA U/F 32G X 4 MM insulin pen needle Use 5 per day 300 each 3    benzoyl peroxide 5 % external liquid Use topically in showers as a body wash 226 g 11    blood glucose (NO BRAND SPECIFIED) lancets standard Use to test blood sugar 1 times daily or as directed. 100 each 11    Continuous Blood Gluc Sensor (FREESTYLE CLAUDIA 2 SENSOR) MISC 1 each See Admin Instructions Change every 14 days. 7 each 3    insulin aspart (NOVOLOG PEN) 100 UNIT/ML pen Inject 5-10 Units Subcutaneous 4 times daily (with meals and nightly) 1unit : 8 g carb before meals.  Also add 1 unit : 50 mg/dl >180 before meals and at bedtime. 45 mL 3    insulin degludec (TRESIBA FLEXTOUCH) 100 UNIT/ML pen Inject 15 units subcutaneous once daily 45 mL 3    ketoconazole (NIZORAL) 2 % external shampoo Apply thin layer topically to scalp in  "shower (leave on 5 min prior to rinse); may also use as a body wash 120 mL 11    lamiVUDine (EPIVIR) 100 MG tablet Take 1 tablet (100 mg) by mouth daily 90 tablet 3    metoprolol succinate ER (TOPROL XL) 25 MG 24 hr tablet Take 1 tablet (25 mg) by mouth daily 45 tablet 1    Multiple Vitamin (TAB-A-GLADIS) TABS TAKE ONE TABLET BY MOUTH ONCE DAILY 90 tablet 0    mycophenolate (GENERIC EQUIVALENT) 250 MG capsule Take 2 capsules (500 mg) by mouth every 12 hours 120 capsule 11    NIFEdipine ER OSMOTIC (PROCARDIA XL) 60 MG 24 hr tablet Take 1 tablet (60 mg) by mouth 2 times daily 60 tablet 11    ondansetron (ZOFRAN ODT) 8 MG ODT tab Take 1 tablet (8 mg) by mouth every 8 hours as needed for nausea 15 tablet 1    pantoprazole (PROTONIX) 40 MG EC tablet Take 1 tablet (40 mg) by mouth daily before breakfast 90 tablet 3    predniSONE (DELTASONE) 5 MG tablet Take 1 tablet (5 mg) by mouth daily 90 tablet 3    rosuvastatin (CRESTOR) 20 MG tablet Take 1 tablet (20 mg) by mouth daily 90 tablet 3    tamsulosin (FLOMAX) 0.4 MG capsule Take 1 capsule (0.4 mg) by mouth daily 90 capsule 3    Vitamin D3 50 mcg (2000 units) tablet Take 1 tablet (50 mcg) by mouth daily 90 tablet 3          Allergies   Allergen Reactions    Codeine Other (See Comments)     Cannot take due to liver  Cannot tolerate oral narcotics    Seasonal Allergies      Sneezing, coughing, runny and itchy eyes         Video physical exam  General: Patient appears well in no acute distress.   Skin: No visualized rash or lesions on visualized skin  Eyes: EOMI, no erythema, sclera icterus or discharge noted  Resp: Appears to be breathing comfortably without accessory muscle usage, speaking in full sentences, no cough  MSK: Appears to have normal range of motion based on visualized movements  Neurologic: No apparent tremors, facial movements symmetric  Psych: affect animated,alert and oriented   Height 1.753 m (5' 9\"), weight 78 kg (172 lb).  Wt Readings from Last 4 Encounters: "   10/06/23 78 kg (172 lb)   09/15/23 81.6 kg (179 lb 12.8 oz)   09/12/23 82.3 kg (181 lb 8 oz)   08/29/23 83.2 kg (183 lb 6.4 oz)         Labs:    Latest Reference Range & Units 10/05/23 10:13   Sodium 135 - 145 mmol/L  135 - 145 mmol/L 137  137   Potassium 3.4 - 5.3 mmol/L  3.4 - 5.3 mmol/L 4.7  4.6   Chloride 98 - 107 mmol/L  98 - 107 mmol/L 101  101   Carbon Dioxide (CO2) 22 - 29 mmol/L  22 - 29 mmol/L 24  25   Urea Nitrogen 8.0 - 23.0 mg/dL  8.0 - 23.0 mg/dL 26.5 (H)  26.7 (H)   Creatinine 0.67 - 1.17 mg/dL  0.67 - 1.17 mg/dL 1.35 (H)  1.36 (H)   GFR Estimate >60 mL/min/1.73m2  >60 mL/min/1.73m2 60 (L)  60 (L)   Calcium 8.6 - 10.0 mg/dL  8.6 - 10.0 mg/dL 10.2 (H)  10.3 (H)   Anion Gap 7 - 15 mmol/L  7 - 15 mmol/L 12  11   Magnesium 1.7 - 2.3 mg/dL 1.9   Phosphorus 2.5 - 4.5 mg/dL 2.8   Albumin 3.5 - 5.2 g/dL  3.5 - 5.2 g/dL 4.5  4.5   Protein Total 6.4 - 8.3 g/dL  6.4 - 8.3 g/dL 7.1  7.2   Alkaline Phosphatase 40 - 129 U/L  40 - 129 U/L 101  102   ALT 0 - 70 U/L  0 - 70 U/L 21  22   AST 0 - 45 U/L  0 - 45 U/L 21  21   Bilirubin Direct 0.00 - 0.30 mg/dL <0.20   Bilirubin Total <=1.2 mg/dL  <=1.2 mg/dL 0.5  0.5   Glucose 70 - 99 mg/dL  70 - 99 mg/dL 169 (H)  169 (H)   WBC 4.0 - 11.0 10e3/uL 4.6   Hemoglobin 13.3 - 17.7 g/dL 10.9 (L)   Hematocrit 40.0 - 53.0 % 34.5 (L)   Platelet Count 150 - 450 10e3/uL 180   RBC Count 4.40 - 5.90 10e6/uL 3.49 (L)   MCV 78 - 100 fL 99   MCH 26.5 - 33.0 pg 31.2   MCHC 31.5 - 36.5 g/dL 31.6   RDW 10.0 - 15.0 % 15.6 (H)   % Neutrophils % 77   % Lymphocytes % 15   % Monocytes % 7   % Eosinophils % 1   % Basophils % 0   Absolute Basophils 0.0 - 0.2 10e3/uL 0.0   Absolute Eosinophils 0.0 - 0.7 10e3/uL 0.0   Absolute Immature Granulocytes <=0.4 10e3/uL 0.0   Absolute Lymphocytes 0.8 - 5.3 10e3/uL 0.7 (L)   Absolute Monocytes 0.0 - 1.3 10e3/uL 0.3   % Immature Granulocytes % 0   Absolute Neutrophils 1.6 - 8.3 10e3/uL 3.5   Absolute NRBCs 10e3/uL 0.0   NRBCs per 100 WBC <1 /100 0   (H):  Data is abnormally high  (L): Data is abnormally low    Imaging: n/a    Impression/plan:   Hepatocellular carcinoma, s/p prior TACE and liver transplantation in November 2019. 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   -started sorafenib 400 mg BID on 7/29.  Tolerated the first several days well, except for some diarrhea. Then developed acute N/V 4 days prior to admission for renal failure and hyperkalemia. T  -he had an extended break off of therapy as he was traveling over the labor day weekend. He resumed 200 mg daily on 9/8.   -dose was increased to 200 mg bid on 9/21/23  -had some diarrhea over the weekend. Talked with someone in hepatology and was started on imodium with imrprovement. No other significant toxicities other than some hyperglycemia and ongoing hypertension  -labs stable. Ok to increase sorafenib to 200 mg in AM and 400 mg in the PM  -will review labs and see him in 2 weeks. If stable, consider increase to 400 mg bid. Will need to review [facility] guidelines with his next labs prior to increasing.  -repeat CT imaging planned at the end of November    Thrombocytopenia, likely s/t sorafenib, no evidence of HIT on VAHID.   -stable    Hx of liver transplant  -immunosuppression with MMF, prednisone as per Dr. Banuelos    CAD s/p 2 vessel CABG 2021  HTN  CKD  -following with  and Dr. Bhatt  -on metoprolol, recently started on nifedipine 60 mg bid    Generalized anxiety  -carries a diagnosis of Bipolar 1 disorder, but notes that diagnosis is incorrect. Has had a little more anxiety since his cancer recurrence and is following with Dr. Murillo every few weeks.

## 2023-10-06 NOTE — LETTER
10/6/2023         RE: Frandy Workman  [address] [facility] [address]        Dear Colleague,    Thank you for referring your patient, Frandy Workman, to the [facility]. Please see a copy of my visit note below.    Virtual Visit Details    Type of service:  Video Visit   Video Start Time:  234  Video End Time:3:11 PM    Originating Location (pt. Location): Home    Distant Location (provider location):  On-site  Platform used for Video Visit: Odette      Reason for Visit: seen in follow-up of recurrent hepatocellular carcinoma    Oncology HPI:   Miller Workman is a 58 year old man with a PMH of DMII, bipolar 1 disorder, depressed with LIZZETTE, cirrhosis s/t ESTRADA with subsequent development of HCC, s/p liver transplant , HLD, HTN, GERD, BPH and CAD with hx of 2 vessel CABG in July 2021, CKD with anemia of chronic disease, on erythropoetin.      He as diagnosed with hepatocellular carcinoma in December 2018 when he was found to have 2 LIRADS 5 lesions on surveillance imaging. He underwent TACE on 1/22/19.  He is s/p  liver transplant on 11/11/2019. The explanted liver had 2 lesions that were mostly necrotic and less than 3 cm with no clearly identifiable vascular invasion.      He was found to have peritoneal masses on routine surveillance imaging in June 2023. He underwent laparoscopy with biopsies and pericecal mass resection confirming metastatic HCC.     He met with Dr. Villarreal in July with recommendation to start sorafenib. He started 400 mg BID on 7/29/23.     He was admitted 8/10-8/16 with hyperkalemia, acute kidney injury. He required one round of hemodialysis. He was noted to have thrombocytopenia with a platelet count from 178 t9 97. A HIT VAHID was negative.   Renal function and hyperkalemia improved through the course of hospitalization. He was followed by nephrology. He was drinking 1.6 L of fluid.  Jardiance and lisinopril held. The cause of the JERONIMO was in the context of  severe nausea and vomiting and diarrhea prior to admission.      He resumed sorafenib 200 mg/day on 9/12/23.  He had repeat CT imaging when he saw  on 9/21/23 that was stable. He was recommended to continue sorafenib, the dose was increased to 200 mg bid.    Interval history: Miller is doing pretty well. Mostly has been having difficulty with hyperglycemia and high blood pressure. Talked with both endocrine and nephrology this week with adjustment of medications.  Glucoses are <200.   Diarrhea last weekend, he treated this with 350 ounces of water/day for 3 days. He also called GI and was recommended to start imodium. He did and the diarrhea has resolved. Had some nausea as well, but that resolved with zofran  -has ongoing cramping in the thighs, this pre-dates the start of sorafenib. Seeing a chiropracter helps.  -weight loss has stabilized, hopes to stay this weight. Tolerating food ok.    -no hand/foot tenderness, mouth sores or hair/nail changes.      Current Outpatient Medications   Medication Sig Dispense Refill    Alcohol Swabs (ALCOHOL PREP) 70 % PADS Use for glucose testing 4x daily  and insulin administration 4x daily. 400 each 1    ammonium lactate (AMLACTIN) 12 % external cream Apply topically daily as needed for dry skin (on feet) 140 g 5    aspirin (SM ASPIRIN ADULT LOW STRENGTH) 81 MG EC tablet Take 2 tablets (162 mg) by mouth daily 180 tablet 3    BD VIKTORIA U/F 32G X 4 MM insulin pen needle Use 5 per day 300 each 3    benzoyl peroxide 5 % external liquid Use topically in showers as a body wash 226 g 11    blood glucose (NO BRAND SPECIFIED) lancets standard Use to test blood sugar 1 times daily or as directed. 100 each 11    Continuous Blood Gluc Sensor (FREESTYLE CLAUDIA 2 SENSOR) [facility] 1 each See Admin Instructions Change every 14 days. 7 each 3    insulin aspart (NOVOLOG PEN) 100 UNIT/ML pen Inject 5-10 Units Subcutaneous 4 times daily (with meals and nightly) 1unit : 8 g carb before meals.  Also  add 1 unit : 50 mg/dl >180 before meals and at bedtime. 45 mL 3    insulin degludec (TRESIBA FLEXTOUCH) 100 UNIT/ML pen Inject 15 units subcutaneous once daily 45 mL 3    ketoconazole (NIZORAL) 2 % external shampoo Apply thin layer topically to scalp in shower (leave on 5 min prior to rinse); may also use as a body wash 120 mL 11    lamiVUDine (EPIVIR) 100 MG tablet Take 1 tablet (100 mg) by mouth daily 90 tablet 3    metoprolol succinate ER (TOPROL XL) 25 MG 24 hr tablet Take 1 tablet (25 mg) by mouth daily 45 tablet 1    Multiple Vitamin (TAB-A-GLADIS) TABS TAKE ONE TABLET BY MOUTH ONCE DAILY 90 tablet 0    mycophenolate (GENERIC EQUIVALENT) 250 MG capsule Take 2 capsules (500 mg) by mouth every 12 hours 120 capsule 11    NIFEdipine ER OSMOTIC (PROCARDIA XL) 60 MG 24 hr tablet Take 1 tablet (60 mg) by mouth 2 times daily 60 tablet 11    ondansetron (ZOFRAN ODT) 8 MG ODT tab Take 1 tablet (8 mg) by mouth every 8 hours as needed for nausea 15 tablet 1    pantoprazole (PROTONIX) 40 MG EC tablet Take 1 tablet (40 mg) by mouth daily before breakfast 90 tablet 3    predniSONE (DELTASONE) 5 MG tablet Take 1 tablet (5 mg) by mouth daily 90 tablet 3    rosuvastatin (CRESTOR) 20 MG tablet Take 1 tablet (20 mg) by mouth daily 90 tablet 3    tamsulosin (FLOMAX) 0.4 MG capsule Take 1 capsule (0.4 mg) by mouth daily 90 capsule 3    Vitamin D3 50 mcg (2000 units) tablet Take 1 tablet (50 mcg) by mouth daily 90 tablet 3          Allergies   Allergen Reactions    Codeine Other (See Comments)     Cannot take due to liver  Cannot tolerate oral narcotics    Seasonal Allergies      Sneezing, coughing, runny and itchy eyes         Video physical exam  General: Patient appears well in no acute distress.   Skin: No visualized rash or lesions on visualized skin  Eyes: EOMI, no erythema, sclera icterus or discharge noted  Resp: Appears to be breathing comfortably without accessory muscle usage, speaking in full sentences, no cough  MSK:  "Appears to have normal range of motion based on visualized movements  Neurologic: No apparent tremors, facial movements symmetric  Psych: affect animated,alert and oriented   Height 1.753 m (5' 9\"), weight 78 kg (172 lb).  Wt Readings from Last 4 Encounters:   10/06/23 78 kg (172 lb)   09/15/23 81.6 kg (179 lb 12.8 oz)   09/12/23 82.3 kg (181 lb 8 oz)   08/29/23 83.2 kg (183 lb 6.4 oz)         Labs:    Latest Reference Range & Units 10/05/23 10:13   Sodium 135 - 145 mmol/L  135 - 145 mmol/L 137  137   Potassium 3.4 - 5.3 mmol/L  3.4 - 5.3 mmol/L 4.7  4.6   Chloride 98 - 107 mmol/L  98 - 107 mmol/L 101  101   Carbon Dioxide (CO2) 22 - 29 mmol/L  22 - 29 mmol/L 24  25   Urea Nitrogen 8.0 - 23.0 mg/dL  8.0 - 23.0 mg/dL 26.5 (H)  26.7 (H)   Creatinine 0.67 - 1.17 mg/dL  0.67 - 1.17 mg/dL 1.35 (H)  1.36 (H)   GFR Estimate >60 mL/min/1.73m2  >60 mL/min/1.73m2 60 (L)  60 (L)   Calcium 8.6 - 10.0 mg/dL  8.6 - 10.0 mg/dL 10.2 (H)  10.3 (H)   Anion Gap 7 - 15 mmol/L  7 - 15 mmol/L 12  11   Magnesium 1.7 - 2.3 mg/dL 1.9   Phosphorus 2.5 - 4.5 mg/dL 2.8   Albumin 3.5 - 5.2 g/dL  3.5 - 5.2 g/dL 4.5  4.5   Protein Total 6.4 - 8.3 g/dL  6.4 - 8.3 g/dL 7.1  7.2   Alkaline Phosphatase 40 - 129 U/L  40 - 129 U/L 101  102   ALT 0 - 70 U/L  0 - 70 U/L 21  22   AST 0 - 45 U/L  0 - 45 U/L 21  21   Bilirubin Direct 0.00 - 0.30 mg/dL <0.20   Bilirubin Total <=1.2 mg/dL  <=1.2 mg/dL 0.5  0.5   Glucose 70 - 99 mg/dL  70 - 99 mg/dL 169 (H)  169 (H)   WBC 4.0 - 11.0 10e3/uL 4.6   Hemoglobin 13.3 - 17.7 g/dL 10.9 (L)   Hematocrit 40.0 - 53.0 % 34.5 (L)   Platelet Count 150 - 450 10e3/uL 180   RBC Count 4.40 - 5.90 10e6/uL 3.49 (L)   MCV 78 - 100 fL 99   MCH 26.5 - 33.0 pg 31.2   MCHC 31.5 - 36.5 g/dL 31.6   RDW 10.0 - 15.0 % 15.6 (H)   % Neutrophils % 77   % Lymphocytes % 15   % Monocytes % 7   % Eosinophils % 1   % Basophils % 0   Absolute Basophils 0.0 - 0.2 10e3/uL 0.0   Absolute Eosinophils 0.0 - 0.7 10e3/uL 0.0   Absolute Immature " Granulocytes <=0.4 10e3/uL 0.0   Absolute Lymphocytes 0.8 - 5.3 10e3/uL 0.7 (L)   Absolute Monocytes 0.0 - 1.3 10e3/uL 0.3   % Immature Granulocytes % 0   Absolute Neutrophils 1.6 - 8.3 10e3/uL 3.5   Absolute NRBCs 10e3/uL 0.0   NRBCs per 100 WBC <1 /100 0   (H): Data is abnormally high  (L): Data is abnormally low    Imaging: n/a    Impression/plan:   Hepatocellular carcinoma, s/p prior TACE and liver transplantation in November 2019. 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   -started sorafenib 400 mg BID on 7/29.  Tolerated the first several days well, except for some diarrhea. Then developed acute N/V 4 days prior to admission for renal failure and hyperkalemia. T  -he had an extended break off of therapy as he was traveling over the labor day weekend. He resumed 200 mg daily on 9/8.   -dose was increased to 200 mg bid on 9/21/23  -had some diarrhea over the weekend. Talked with someone in hepatology and was started on imodium with imrprovement. No other significant toxicities other than some hyperglycemia and ongoing hypertension  -labs stable. Ok to increase sorafenib to 200 mg in AM and 400 mg in the PM  -will review labs and see him in 2 weeks. If stable, consider increase to 400 mg bid. Will need to review [facility] guidelines with his next labs prior to increasing.  -repeat CT imaging planned at the end of November    Thrombocytopenia, likely s/t sorafenib, no evidence of HIT on VAHID.   -stable    Hx of liver transplant  -immunosuppression with MMF, prednisone as per Dr. Banuelos    CAD s/p 2 vessel CABG 2021  HTN  CKD  -following with  and Dr. Bhatt  -on metoprolol, recently started on nifedipine 60 mg bid    Generalized anxiety  -carries a diagnosis of Bipolar 1 disorder, but notes that diagnosis is incorrect. Has had a little more anxiety since his cancer recurrence and is following with Dr. Murillo every few weeks.          Brenda Wilson, SHANIQUE CNP

## 2023-10-09 ENCOUNTER — TELEPHONE (OUTPATIENT)
Dept: TRANSPLANT | Facility: CLINIC | Age: 58
End: 2023-10-09
Payer: MEDICARE

## 2023-10-09 NOTE — TELEPHONE ENCOUNTER
\[facility]\"" placed call to Mr. Workman with direction for Request to Amend Medical Record.    Mr. Workman was initially upset, but \[facility]\"" was able to direct him that his clinical team cannot addend any diagnosis outside of the proper channel.   With clear intent for ongoing clinical pursuit of the patient's best interest, Mr. Workman was understanding and agreed to follow the appropriate workflow for Request to Amend Medical Record.    Contact information provided.

## 2023-10-10 ENCOUNTER — MYC MEDICAL ADVICE (OUTPATIENT)
Dept: BEHAVIORAL HEALTH | Facility: CLINIC | Age: 58
End: 2023-10-10
Payer: MEDICARE

## 2023-10-10 ENCOUNTER — VIRTUAL VISIT (OUTPATIENT)
Dept: BEHAVIORAL HEALTH | Facility: CLINIC | Age: 58
End: 2023-10-10
Payer: MEDICARE

## 2023-10-10 DIAGNOSIS — F31.31 BIPOLAR 1 DISORDER, DEPRESSED, MILD (H): Primary | ICD-10-CM

## 2023-10-10 DIAGNOSIS — F41.1 GENERALIZED ANXIETY DISORDER: ICD-10-CM

## 2023-10-10 PROBLEM — G47.33 OSA (OBSTRUCTIVE SLEEP APNEA): Status: ACTIVE | Noted: 2023-10-10

## 2023-10-10 PROCEDURE — 90839 PSYTX CRISIS INITIAL 60 MIN: CPT | Mod: 95 | Performed by: PSYCHOLOGIST

## 2023-10-10 NOTE — PROCEDURES
"   SLEEP STUDY INTERPRETATION  DIAGNOSTIC POLYSOMNOGRAPHY REPORT      Patient: DAVID SANDERS  YOB: 1965  Study Date: 10/4/2023  MRN: [id number]  Referring Provider: Lamin Ortiz MD  Ordering Provider: Anu Simpson MD    Indications for Polysomnography: The patient is a 58 year old Male who is 5' 9\" and weighs 179.0 lbs. His BMI is 26.5, Garland sleepiness scale 6/24 and neck circumference is 32 cm. Relevant medical history includes s/p liver transplant for ESTRADA, alpha 1 MZ phenotype.  A diagnostic polysomnogram was performed to evaluate for sleep apnea.    Polysomnogram Data: A full night polysomnogram recorded the standard physiologic parameters including EEG, EOG, EMG, ECG, nasal and oral airflow. Respiratory parameters of chest and abdominal movements were recorded with respiratory inductance plethysmography. Oxygen saturation was recorded by pulse oximetry. Hypopnea scoring rule used: 1B 4%.    Sleep Architecture: Decreased sleep efficiency with increased arousal index and increased wake after sleep onset.  The total recording time of the polysomnogram was 424.5 minutes. The total sleep time was 331.5 minutes. Sleep latency was decreased at 0.0 minutes with the use of a sleep aid (melatonin 6 mg). REM latency was 113.5 minutes. Arousal index was increased at 41.4 arousals per hour. Sleep efficiency was decreased at 78.1%. Wake after sleep onset was 93.0 minutes. The patient spent 10.3% of total sleep time in Stage N1, 44.2% in Stage N2, 22.2% in Stage N3, and 23.4% in REM. Time in REM supine was - minutes.    Respiration: Overall moderate, supine predominant, obstructive sleep apnea with associated hypoxemia. (17% of respiratory events were central in nature.)  Events ? The polysomnogram revealed a presence of 92 obstructive, 27 central, and 4 mixed apneas resulting in an apnea index of 22.3 events per hour. There were 38 obstructive hypopneas and - central hypopneas resulting in an " obstructive hypopnea index of 6.9 and central hypopnea index of - events per hour. The combined apnea/hypopnea index was 29.1 events per hour (central apnea/hypopnea index was 4.9 events per hour). The REM AHI was 19.4 events per hour. The supine AHI was 89.0 events per hour. The RERA index was 4.2 events per hour.  The RDI was 33.3 events per hour.  Snoring - was reported as moderate.  Respiratory rate and pattern - was notable for normal respiratory rate and pattern. (Lung to finger circulation time around 30 sec)  Sustained Sleep Associated Hypoventilation - Transcutaneous carbon dioxide monitoring was not used, however significant hypoventilation was not suggested by oximetry.  Sleep Associated Hypoxemia - (Greater than 5 minutes O2 sat at or below 88%) was present. Baseline oxygen saturation was 95.3%. Lowest oxygen saturation was 76.0%. Time spent less than or equal to 88% was 7.7 minutes. Time spent less than or equal to 89% was 11.2 minutes.    Movement Activity: Periodic limb movements noted, most of which were not associated with arousals.  Periodic Limb Activity - There were 35 PLMs during the entire study. The PLM index was 6.3 movements per hour. The PLM Arousal Index was 1.3 per hour.  REM EMG Activity - Excessive transient/sustained muscle activity was not present.  Nocturnal Behavior - Abnormal sleep related behaviors were not noted.  Bruxism - None apparent.    Cardiac Summary: Sinus rhythm with rates occasionally greater than 90 bpm in sleep.  The average pulse rate was 87.6 bpm. The minimum pulse rate was 70.0 bpm while the maximum pulse rate was 101.0 bpm.        Assessment:   Overall moderate, supine predominant, obstructive sleep apnea with AHI 29.1 events per hour and associated hypoxemia. (17% of respiratory events were central in nature.)  Decreased sleep efficiency with increased arousal index and increased wake after sleep onset.  Periodic limb movements noted, most of which were not  associated with arousals.  Sinus rhythm with rates occasionally greater than 90 bpm in sleep.    Recommendations:  Recommend repeat polysomnography with full night titration of positive airway pressure therapy for the control of sleep disordered breathing.  Alternatively, could consider empirically initiated with Auto?titrating PAP therapy with a range of5 to 15 cmH2O with close clinical follow up with sleep management team.  Patient may be a candidate for dental appliance through referral to Sleep Dentistry for the treatment of obstructive sleep apnea -most likely to be effective if used with position restriction to non-supine sleep positions.  Advice regarding the risks of drowsy driving.  Suggest optimizing sleep schedule and avoiding sleep deprivation.  Pharmacologic therapy should be used for management of restless legs syndrome only if present and clinically indicated and not based on the presence of periodic limb movements alone.    Diagnostic Codes:   Obstructive Sleep Apnea G47.33  Sleep Hypoxemia G47.36              _____________________________________   Electronically Signed By: Anu Simpson MD 10/10/2023

## 2023-10-10 NOTE — PROGRESS NOTES
[facility]: Integrated Behavioral Health  October 12, 2023          Behavioral Health Clinician Progress Note    Patient Name: Frandy Workman           Service Type: Video visit      Service Location:  Video visit      Session Start Time: 11:45  Session End Time: 12:30      Session Length: 38 - 52      Attendees: Patient    Visit Activities (Refresh list every visit): [facility] Only     Service Modality:  Video Visit:      Provider verified identity through the following two step process.  Patient provided:  Patient photo and Patient is known previously to provider    Telemedicine Visit: The patient's condition can be safely assessed and treated via synchronous audio and visual telemedicine encounter.      Reason for Telemedicine Visit: Services only offered telehealth    Originating Site (Patient Location): Patient's home    Distant Site (Provider Location): [facility]: [facility]    Consent:  The patient/guardian has verbally consented to: the potential risks and benefits of telemedicine (video visit) versus in person care; bill my insurance or make self-payment for services provided; and responsibility for payment of non-covered services.     Patient would like the video invitation sent by:  My Chart    Mode of Communication:  [facility] Conference [facility]    Distant Location (Provider):  On-site    As the provider I attest to compliance with applicable laws and regulations related to telemedicine.      Diagnostic Assessment Date:  12/03/2020  Treatment Plan Review Date:  11/4/2023  See Flowsheets for today's PHQ-9 and LIZZETTE-7 results  Previous PHQ-9:       6/28/2023     4:07 PM 8/3/2023     9:13 AM 9/20/2023     2:45 PM   PHQ-9 SCORE   PHQ-9 Total Score MyChart 3 (Minimal depression) 3 (Minimal depression) 5 (Mild depression)   PHQ-9 Total Score 3 3 5     Previous LIZZETTE-7:       4/7/2021    12:34 PM 9/30/2021     1:45 PM 5/17/2023     3:37 PM   LIZZETTE-7 SCORE   Total Score 9 (mild  "anxiety)     Total Score 9 10 6       Extended Session (60+ minutes): No  Interactive Complexity: No  Crisis: Yes, visit entailed Crisis Management / Stabilization requiring urgent assessment and history of the crisis state, mental status exam and disposition    Treatment Objective(s) Addressed in This Session:  Target Behavior(s): disease management/lifestyle changes   related to adaptive approaches to managing anxious distress and mood difficulties    Depressed mood: Increase interest, pleasure in doing things. Anger management strategies.  Improve sleep hygiene       Current Stressors / Issues:  [facility] met with Miller today for a follow-up, to help Miller continue to feel supported in his health behavior change and overall mental health.      Miller presents today in a \"crisis\" noting a recent interpersonal conflict/dispute. He provides an overview of the conflict. Miller speech was slightly pressured during our session today and he is also describing a few instances of giving large sums of money to others. Miller is also noting a conflict and experience of irritability/anger while at [employer] buying a gift for a woman he knows. Miller states frustration with being told he has Bipolar disorder, noting he believes confidently that his care team \"got this wrong\" and that there is an underlying medical cause for his symptoms. He also notes at times his symptoms being more due to anxiety. Writer did attempt to provide Miller with education about Bipolar disorder, though Miller stated he did not want to hear it. Provided supportive therapy and reviewed possible safety concerns - Miller denies adamantly that he is not at a danger to himself or others (he denies homicidal or suicidal ideation today). He did make a comment about considering ending all his treatment or discontinuing his self-care (e.g. taking medications) though he was receptive to considering how far he has come in his health. He also reports not wanting to do anything to " disappoint or let down his care team by doing this. Provider also gave Miller crisis resources in a Mychart message if he needs them.             Progress on Treatment Objective(s) / Homework:  Stable - ACTION (Actively working towards change); Intervened by reinforcing change plan / affirming steps taken      Solution-Focused Therapy  Explore patterns in patient's relationships and discuss options for new behaviors.  Explore patterns in patient's actions and choices and discuss options for new behaviors.    Behavioral Activation  Discuss steps patient can take to become more involved in meaningful activity.  Identify barriers to these activities and explore possible solutions.      Answers for HPI/ROS submitted by the patient on 3/21/2022  If you checked off any problems, how difficult have these problems made it for you to do your work, take care of things at home, or get along with other people?: Not difficult at all  PHQ9 TOTAL SCORE: 6      Answers for HPI/ROS submitted by the patient on 2/8/2022  If you checked off any problems, how difficult have these problems made it for you to do your work, take care of things at home, or get along with other people?: Somewhat difficult  PHQ9 TOTAL SCORE: 4      Answers for HPI/ROS submitted by the patient on 4/7/2021   LZIZETTE 7 TOTAL SCORE: 9  If you checked off any problems, how difficult have these problems made it for you to do your work, take care of things at home, or get along with other people?: Very difficult  PHQ9 TOTAL SCORE: 7*    *No SI    Answers for HPI/ROS submitted by the patient on 10/13/2020   If you checked off any problems, how difficult have these problems made it for you to do your work, take care of things at home, or get along with other people?: Somewhat difficult  PHQ9 TOTAL SCORE: 8*  LIZZETTE 7 TOTAL SCORE: 6      *No SI     Answers for HPI/ROS submitted by the patient on 12/29/2020   If you checked off any problems, how difficult have these problems made  it for you to do your work, take care of things at home, or get along with other people?: Somewhat difficult  PHQ9 TOTAL SCORE: 5*  LIZZETTE 7 TOTAL SCORE: 8    *No SI     Answers for HPI/ROS submitted by the patient on 11/21/2022  If you checked off any problems, how difficult have these problems made it for you to do your work, take care of things at home, or get along with other people?: Very difficult  PHQ9 TOTAL SCORE: 4          Care Plan review completed: no    Medication Review:  No changes to current psychiatric medication(s)     Reports discontinuing zolpidem.       Current Outpatient Medications   Medication    Alcohol Swabs (ALCOHOL PREP) 70 % PADS    ammonium lactate (AMLACTIN) 12 % external cream    aspirin (SM ASPIRIN ADULT LOW STRENGTH) 81 MG EC tablet    BD VIKTORIA U/F 32G X 4 MM insulin pen needle    benzoyl peroxide 5 % external liquid    blood glucose (NO BRAND SPECIFIED) lancets standard    Continuous Blood Gluc Sensor (FREESTYLE CLAUDIA 2 SENSOR) MISC    insulin aspart (NOVOLOG PEN) 100 UNIT/ML pen    insulin degludec (TRESIBA FLEXTOUCH) 100 UNIT/ML pen    ketoconazole (NIZORAL) 2 % external shampoo    lamiVUDine (EPIVIR) 100 MG tablet    loperamide (IMODIUM A-D) 2 MG tablet    metoprolol succinate ER (TOPROL XL) 25 MG 24 hr tablet    Multiple Vitamin (TAB-A-GLADIS) TABS    mycophenolate (GENERIC EQUIVALENT) 250 MG capsule    NIFEdipine ER OSMOTIC (PROCARDIA XL) 60 MG 24 hr tablet    ondansetron (ZOFRAN ODT) 8 MG ODT tab    pantoprazole (PROTONIX) 40 MG EC tablet    predniSONE (DELTASONE) 5 MG tablet    rosuvastatin (CRESTOR) 20 MG tablet    SORAfenib (NEXAVAR) 200 MG tablet    tamsulosin (FLOMAX) 0.4 MG capsule    Vitamin D3 50 mcg (2000 units) tablet     Current Facility-Administered Medications   Medication    aflibercept (EYLEA) injection prefilled syringe 2 mg    aflibercept (EYLEA) injection prefilled syringe 2 mg    dexamethasone (OZURDEX) intravitreal implant 0.7 mg    dexamethasone (OZURDEX)  intravitreal implant 0.7 mg    faricimab-svoa (VABYSMO) intravitreal injection 6 mg    faricimab-svoa (VABYSMO) intravitreal injection 6 mg    lidocaine (PF) (XYLOCAINE) injection 1 mL       Medication Compliance:  Yes    Changes in Health Issues:   None reported    Chemical Use Review:   Substance Use: Chemical use reviewed, no active concerns identified      Tobacco Use: No current tobacco use.         Assessment: Current Emotional / Mental Status (status of significant symptoms):  Risk status (Self / Other harm or suicidal ideation)  Patient denies a history of suicidal ideation, suicide attempts, self-injurious behavior, homicidal ideation, homicidal behavior and and other safety concerns     Patient denies current fears or concerns for personal safety.  Patient denies current or recent suicidal ideation or behaviors.  Patient denies current or recent homicidal ideation or behaviors.  Patient denies current or recent self injurious behavior or ideation.  Patient denies other safety concerns.     A safety and risk management plan has not been developed at this time, however patient was encouraged to call [place] [phone] should there be a change in any of these risk factors.    [place] Suicide Severity Rating Scale (Short Version)      7/1/2020    11:00 AM 7/12/2021     2:30 PM 1/10/2022     9:28 PM 1/3/2023     6:31 AM 1/24/2023     6:22 AM 7/13/2023    10:31 AM 8/10/2023     3:31 PM   [place] Suicide Severity Rating (Short Version)   Over the past 2 weeks have you felt down, depressed, or hopeless? no no no no no no no   Over the past 2 weeks have you had thoughts of killing yourself? no no no no  no no   Have you ever attempted to kill yourself? no no no no  no no   Q1 Wished to be Dead (Past Month)    no      Q2 Suicidal Thoughts (Past Month)    no      Q3 Suicidal Thought Method    no      Q4 Suicidal Intent without Specific Plan    no      Q5 Suicide Intent with Specific Plan    no      Q6 Suicide  Behavior (Lifetime)    no      Level of Risk per Screen    low risk            Appearance:   Appropriate   Eye Contact:   Good   Psychomotor Behavior: TD evident   Attitude:   Cooperative  Interested Friendly Pleasant  Orientation:   All  Speech   Rate / Production: Normal/ Responsive   Volume:  Normal   Mood:    Depressed ; improving  Affect:    Appropriate    Thought Content:  Clear   Thought Form:  Goal Directed  Logical   Insight:    Good     Diagnoses:  1. Bipolar 1 disorder, depressed, mild (H)    2. Generalized anxiety disorder                Collateral Reports Completed:  Not Applicable    Plan: (Homework, other):  Continue with this writer for individual psychotherapy in 2/3 wks. He will continue to work on managing depression and anxiety through achievable behavioral activation ideas. Information about sleep hygiene provided. Handout given in AVS. .  No CD issues.     Joseph Murillo Psy.D, LP   Behavioral Health Clinician   [facility] [place]              ______________________________________________________________________                                            Individual Treatment Plan    Patient's Name: Frandy Workman  YOB: 1965    Date of Creation: 3/1/2022  Date Treatment Plan Last Reviewed/Revised: 8/4/2023    DSM5 Diagnoses: 296.51 Bipolar I Disorder Current or Most Recent Episode Depressed, Mild or 300.02 (F41.1) Generalized Anxiety Disorder  Psychosocial / Contextual Factors: Post Solid Organ Tx  PROMIS (reviewed every 90 days): 4    Referral / Collaboration:  Referral to another professional/service is not indicated at this time..    Anticipated number of session for this episode of care: 4-6  Anticipation frequency of session: Every other week  Anticipated Duration of each session: 38-52 minutes  Treatment plan will be reviewed in 90 days or when goals have been changed.       MeasurableTreatment Goal(s) related to diagnosis / functional  "impairment(s)  Goal 1: Patient will experience a reduction in depressive symptoms along with a corresponding increase in positive emotion and life satisfaction.      Objective #A (Patient Action)    Patient will Increase interest, engagement, and pleasure in doing things.  Status: Continued - Date(s): 8/4/2023    Intervention(s)  Therapist will help patient identify pleasant and mastery oriented events that elicit positive, relaxed mood.    Objective #B  Patient will Decrease frequency and intensity of feeling down, depressed, hopeless.  Status: Continued - Date(s): 8/4/2023    Intervention(s)  Therapist will introduce patient to cognitive-behavioral and acceptance and commitment therapy topics aimed to help reduce depression and anxiety    Objective #C  Patient will Identify negative self-talk and behaviors: challenge core beliefs, myths, and actions  Improve concentration, focus, and mindfulness in daily activities .  Status: Continued - Date(s): COMPLETE    Intervention(s)  Therapist will help patient identify and manage negative self-talk and automatic thoughts; introduce patient to cognitive distortions; help patient develop cognitive diffusion techniques      Goal 2: Patient will experience a reduction in anxious symptoms, along with a corresponding increase in relaxed emotional states and life satisfaction.      Objective #A (Patient Action)  Patient will use cognitive-behavioral and thought diffusion strategies identified in therapy to challenge anxious thoughts.    Status: Continued - Date(s): COMPLETE    Intervention(s)  Therapist will utilize CBT and ACT ideas to help patient challenge anxious thoughts and reduce intensity/duration of anxious distress    Objective #B  Patient will use \"worry time\" each day for 15 minutes of scheduled worry and then defer obsessive or anxious thinking until the next structured worry time.    Status: Continued - Date(s): COMPLETE    Intervention(s)  Therapist will teach " patient how to effectively utilize worry time and/or thought logs/journals each day and incorporate more relaxation behaviors into their routine.    Objective #C  Patient will identify the stressors which contribute to feelings of anxiety  Patient will increase engagement in adaptive coping skills and recreational activities, such as exercise and healthy socialization, to manage distress.  Status: Continued - Date(s): COMPLETE    Intervention(s)  Therapist will help patient identify triggers/situational factors that contribute to anxiety and behavioral skills aimed to manage anxious distress.      Goal 3: Patiient will work toward adaptively managing bipolar-related depression and manic episodes      Objective #A (Patient Action)    Status: Continued - Date(s): COMPLETE    Patient will identify 2-3 signs or signals of emerging mood instability.    Intervention(s)  Therapist will provide educational materials on bipolar disorder and help identifying triggers for mood instability .    Objective #B  Patient will identify 2-3 strategies for more effectively managing Bipolar Disorder.    Status: Continued - Date(s): COMPLETE    Intervention(s)  Therapist will teach emotional recognition/identification. Skills aimed to effectively manage bipolar disorder .      Other Possible Therapeutic Intervention(s):    Psycho-education regarding mental health diagnoses and treatment options    Supportive Therapy  Provide affirmations, reflections, and establish working rapport  Emphasize and reflect on strength of therapeutic relationship    Skills training  Explore skills useful to client in current situation.  Skills include assertiveness, communication, conflict management, problem-solving, relaxation, etc.    Solution-Focused Therapy  Explore patterns in patient's relationships and discuss options for new behaviors.  Explore patterns in patient's actions and choices and discuss options for new behaviors.    Cognitive-behavioral  Therapy  Discuss common cognitive distortions, identify them in patient's life.  Explore ways to challenge, replace, and act against these cognitions.    Acceptance and Commitment Therapy  Explore and identify important values in patient's life.  Discuss ways to commit to behavioral activation around these values.    Psychodynamic psychotherapy  Discuss patient's emotional dynamics and issues and how they impact behaviors.  Explore patient's history of relationships and how they impact present behaviors.  Explore how to work with and make changes in these schemas and patterns.    Narrative Therapy  Explore the patient's story of his/her life from his/her perspective.  Explore alternate ways of understanding their experience, identifying exceptions, developing new themes.    Interpersonal Psychotherapy  Explore patterns in relationships that are effective or ineffective at helping patient reach their goals, find satisfying experience.  Discuss new patterns or behaviors to engage in for improved social functioning.    Behavioral Activation  Discuss steps patient can take to become more involved in meaningful activity.  Identify barriers to these activities and explore possible solutions.    Mindfulness-Based Strategies  Discuss skills based on development and application of mindfulness.  Skills drawn from compassion-focused therapy, dialectical behavior therapy, mindfulness-based stress reduction, mindfulness-based cognitive therapy, etc.      Patient has reviewed and agreed to the above plan.      Joseph Murillo Psy.D, LP   Behavioral Health Clinician   -[facility]     8/4/2023  Answers for HPI/ROS submitted by the patient on 2/28/2023  If you checked off any problems, how difficult have these problems made it for you to do your work, take care of things at home, or get along with other people?: Very difficult  PHQ9 TOTAL SCORE: 6    Answers for HPI/ROS submitted by the patient on  6/28/2023  If you checked off any problems, how difficult have these problems made it for you to do your work, take care of things at home, or get along with other people?: Somewhat difficult  PHQ9 TOTAL SCORE: 3Answers submitted by the patient for this visit:  Patient Health Questionnaire (Submitted on 9/20/2023)  If you checked off any problems, how difficult have these problems made it for you to do your work, take care of things at home, or get along with other people?: Somewhat difficult  PHQ9 TOTAL SCORE: 5

## 2023-10-11 ENCOUNTER — ANCILLARY PROCEDURE (OUTPATIENT)
Dept: ULTRASOUND IMAGING | Facility: CLINIC | Age: 58
End: 2023-10-11
Attending: NURSE PRACTITIONER
Payer: MEDICARE

## 2023-10-11 ENCOUNTER — MYC MEDICAL ADVICE (OUTPATIENT)
Dept: ONCOLOGY | Facility: CLINIC | Age: 58
End: 2023-10-11
Payer: MEDICARE

## 2023-10-11 ENCOUNTER — ONCOLOGY VISIT (OUTPATIENT)
Dept: ONCOLOGY | Facility: CLINIC | Age: 58
End: 2023-10-11
Attending: PHYSICIAN ASSISTANT
Payer: MEDICARE

## 2023-10-11 VITALS
HEART RATE: 104 BPM | DIASTOLIC BLOOD PRESSURE: 81 MMHG | RESPIRATION RATE: 20 BRPM | WEIGHT: 178 LBS | SYSTOLIC BLOOD PRESSURE: 131 MMHG | TEMPERATURE: 98.2 F | OXYGEN SATURATION: 96 % | BODY MASS INDEX: 26.29 KG/M2

## 2023-10-11 DIAGNOSIS — M79.662 BILATERAL CALF PAIN: ICD-10-CM

## 2023-10-11 DIAGNOSIS — R60.9 EDEMA, UNSPECIFIED TYPE: ICD-10-CM

## 2023-10-11 DIAGNOSIS — I82.452 ACUTE DEEP VEIN THROMBOSIS (DVT) OF LEFT PERONEAL VEIN (H): Primary | ICD-10-CM

## 2023-10-11 DIAGNOSIS — M79.661 BILATERAL CALF PAIN: ICD-10-CM

## 2023-10-11 DIAGNOSIS — C22.0 HEPATOCELLULAR CARCINOMA (H): ICD-10-CM

## 2023-10-11 DIAGNOSIS — M79.89 LEG SWELLING: ICD-10-CM

## 2023-10-11 DIAGNOSIS — Z79.899 ENCOUNTER FOR LONG-TERM (CURRENT) USE OF MEDICATIONS: ICD-10-CM

## 2023-10-11 LAB — RADIOLOGIST FLAGS: ABNORMAL

## 2023-10-11 PROCEDURE — 99214 OFFICE O/P EST MOD 30 MIN: CPT | Performed by: PHYSICIAN ASSISTANT

## 2023-10-11 PROCEDURE — G0463 HOSPITAL OUTPT CLINIC VISIT: HCPCS | Performed by: PHYSICIAN ASSISTANT

## 2023-10-11 PROCEDURE — 93970 EXTREMITY STUDY: CPT | Mod: GC | Performed by: RADIOLOGY

## 2023-10-11 RX ORDER — APIXABAN 5 MG (74)
KIT ORAL
Qty: 74 EACH | Refills: 0 | Status: SHIPPED | OUTPATIENT
Start: 2023-10-11 | End: 2023-10-26 | Stop reason: DRUGHIGH

## 2023-10-11 ASSESSMENT — PAIN SCALES - GENERAL: PAINLEVEL: EXTREME PAIN (8)

## 2023-10-11 NOTE — PROGRESS NOTES
Oncology Follow Up Note:  Oct 11, 2023    Reason for Visit: seen in follow-up of recurrent hepatocellular carcinoma, evaluation of leg swelling    Oncology HPI:   Miller Workman is a 58 year old man with a PMH of DMII, bipolar 1 disorder, depressed with LIZZETTE, cirrhosis s/t ESTRADA with subsequent development of HCC, s/p liver transplant , HLD, HTN, GERD, BPH and CAD with hx of 2 vessel CABG in July 2021, CKD with anemia of chronic disease, on erythropoetin.      He as diagnosed with hepatocellular carcinoma in December 2018 when he was found to have 2 LIRADS 5 lesions on surveillance imaging. He underwent TACE on 1/22/19.  He is s/p  liver transplant on 11/11/2019. The explanted liver had 2 lesions that were mostly necrotic and less than 3 cm with no clearly identifiable vascular invasion.      He was found to have peritoneal masses on routine surveillance imaging in June 2023. He underwent laparoscopy with biopsies and pericecal mass resection confirming metastatic HCC.     He met with Dr. Villarreal in July with recommendation to start sorafenib. He started 400 mg BID on 7/29/23.     He was admitted 8/10-8/16 with hyperkalemia, acute kidney injury. He required one round of hemodialysis. He was noted to have thrombocytopenia with a platelet count from 178 t9 97. A HIT VAHID was negative.   Renal function and hyperkalemia improved through the course of hospitalization. He was followed by nephrology. He was drinking 1.6 L of fluid.  Jardiance and lisinopril held. The cause of the JERONIMO was in the context of severe nausea and vomiting and diarrhea prior to admission.      He resumed sorafenib 200 mg/day on 9/12/23.  He had repeat CT imaging when he saw  on 9/21/23 that was stable. He was recommended to continue sorafenib, the dose was increased to 200 mg bid, then increased further to 200/400 mg bid on 10/6/23.     He is here today for assessment of leg swelling.     Interval history:   Patient reports that he has been  having intermittent pain in his legs since first starting on saracatinib.  However he has noticed this pain has been more bothersome with rest and decreases with movement.  He was able to go on his normal 3-1/2 mile walk this morning.  He has noticed new swelling in his ankles and legs over the last 24 hours or so.  He denies any chest pain or shortness of breath.  He also notes that he has developed cold symptoms with some congestion over the last few days.  He has found relief with the use of Mucinex.  He denies other concerns.    Current Outpatient Medications   Medication Sig Dispense Refill    Alcohol Swabs (ALCOHOL PREP) 70 % PADS Use for glucose testing 4x daily  and insulin administration 4x daily. 400 each 1    ammonium lactate (AMLACTIN) 12 % external cream Apply topically daily as needed for dry skin (on feet) 140 g 5    aspirin (SM ASPIRIN ADULT LOW STRENGTH) 81 MG EC tablet Take 2 tablets (162 mg) by mouth daily 180 tablet 3    BD VIKTORIA U/F 32G X 4 MM insulin pen needle Use 5 per day 300 each 3    benzoyl peroxide 5 % external liquid Use topically in showers as a body wash 226 g 11    blood glucose (NO BRAND SPECIFIED) lancets standard Use to test blood sugar 1 times daily or as directed. 100 each 11    Continuous Blood Gluc Sensor (FREESTYLE CLAUDIA 2 SENSOR) MISC 1 each See Admin Instructions Change every 14 days. 7 each 3    insulin aspart (NOVOLOG PEN) 100 UNIT/ML pen Inject 5-10 Units Subcutaneous 4 times daily (with meals and nightly) 1unit : 8 g carb before meals.  Also add 1 unit : 50 mg/dl >180 before meals and at bedtime. 45 mL 3    insulin degludec (TRESIBA FLEXTOUCH) 100 UNIT/ML pen Inject 15 units subcutaneous once daily 45 mL 3    ketoconazole (NIZORAL) 2 % external shampoo Apply thin layer topically to scalp in shower (leave on 5 min prior to rinse); may also use as a body wash 120 mL 11    lamiVUDine (EPIVIR) 100 MG tablet Take 1 tablet (100 mg) by mouth daily 90 tablet 3    loperamide  (IMODIUM A-D) 2 MG tablet Take 1-2 tablets (2-4 mg) by mouth 4 times daily as needed for diarrhea 120 tablet 3    metoprolol succinate ER (TOPROL XL) 25 MG 24 hr tablet Take 1 tablet (25 mg) by mouth daily 45 tablet 1    Multiple Vitamin (TAB-A-GLADIS) TABS TAKE ONE TABLET BY MOUTH ONCE DAILY 90 tablet 0    mycophenolate (GENERIC EQUIVALENT) 250 MG capsule Take 2 capsules (500 mg) by mouth every 12 hours 120 capsule 11    NIFEdipine ER OSMOTIC (PROCARDIA XL) 60 MG 24 hr tablet Take 1 tablet (60 mg) by mouth 2 times daily 60 tablet 11    ondansetron (ZOFRAN ODT) 8 MG ODT tab Take 1 tablet (8 mg) by mouth every 8 hours as needed for nausea 30 tablet 3    pantoprazole (PROTONIX) 40 MG EC tablet Take 1 tablet (40 mg) by mouth daily before breakfast 90 tablet 3    predniSONE (DELTASONE) 5 MG tablet Take 1 tablet (5 mg) by mouth daily 90 tablet 3    rosuvastatin (CRESTOR) 20 MG tablet Take 1 tablet (20 mg) by mouth daily 90 tablet 3    SORAfenib (NEXAVAR) 200 MG tablet Take 200 mg in AM and 400 mg in PM on an empty stomach 1 hour before or 2 hours after a meal. Meet with oncology in 2 weeks to discuss ramp up to 400 mg twice daily 120 tablet 0    tamsulosin (FLOMAX) 0.4 MG capsule Take 1 capsule (0.4 mg) by mouth daily 90 capsule 3    Vitamin D3 50 mcg (2000 units) tablet Take 1 tablet (50 mcg) by mouth daily 90 tablet 3       Physical Exam:  General: The patient is a pleasant male in no acute distress.  /81 (BP Location: Left arm, Patient Position: Sitting, Cuff Size: Adult Regular)   Pulse 104   Temp 98.2  F (36.8  C) (Oral)   Resp 20   Wt 80.7 kg (178 lb)   SpO2 96%   BMI 26.29 kg/m    Wt Readings from Last 10 Encounters:   10/11/23 80.7 kg (178 lb)   10/06/23 78 kg (172 lb)   09/15/23 81.6 kg (179 lb 12.8 oz)   09/12/23 82.3 kg (181 lb 8 oz)   08/29/23 83.2 kg (183 lb 6.4 oz)   08/25/23 83.3 kg (183 lb 9.6 oz)   08/15/23 83.1 kg (183 lb 4.8 oz)   08/10/23 82.6 kg (182 lb)   08/09/23 84.9 kg (187 lb 3.2 oz)    07/26/23 88.5 kg (195 lb)   HEENT: EOMI. Sclerae are anicteric.   Heart: Regular rate and rhythm.   Lungs: Clear to auscultation bilaterally.   Extremities: 1+ lower extremity edema noted bilaterally in the ankles and shins, left slightly larger than right, tenderness noted bilaterally anterior to the medial malleoli.   Neuro: Cranial nerves II through XII are grossly intact.    Labs:   Most Recent 3 CBC's:  Recent Labs   Lab Test 10/05/23  1013 09/19/23  0923 09/07/23  1014   WBC 4.6 4.7 4.9   HGB 10.9* 10.9* 11.5*   MCV 99 100 98    199 220   ANEUTAUTO 3.5 3.7 3.5     Most Recent 3 BMP's:  Recent Labs   Lab Test 10/05/23  1013 09/19/23  0923 09/07/23  1014     137 138 138   POTASSIUM 4.7  4.6 4.9 4.5   CHLORIDE 101  101 102 103   CO2 24  25 24 25   BUN 26.5*  26.7* 24.1* 33.5*   CR 1.35*  1.36* 1.35* 1.50*   ANIONGAP 12  11 12 10   DEBORAH 10.2*  10.3* 9.7 10.0   *  169* 143* 101*   PROTTOTAL 7.1  7.2 7.2 7.5   ALBUMIN 4.5  4.5 4.6 4.9    Most Recent 3 LFT's:  Recent Labs   Lab Test 10/05/23  1013 09/19/23  0923 09/07/23  1014   AST 21  21 19 20   ALT 21  22 18 19   ALKPHOS 101  102 100 86   BILITOTAL 0.5  0.5 0.6 0.5    Most Recent 2 TSH and T4:  Recent Labs   Lab Test 04/25/22  0925 10/04/21  0802 01/24/20  0837 08/08/18  1246   TSH 1.24 1.90   < > 0.49   T4  --   --   --  1.21    < > = values in this interval not displayed.     I reviewed the above labs today.    Imaging:  US Lower Extremity Venous Duplex Bilateral  RESIDENT PRELIMINARY INTERPRETATION  IMPRESSION:  1.  Acute deep venous thrombosis in one of the paired left peroneal  veins.  2.  No deep venous thrombosis in the right lower extremity.    [Critical Result: Acute left DVT]    Finding was identified on 10/11/2023 12:29 PM.     Brenda Wilson was contacted by Dr. Bond on 10/11/2023 12:31 PM  and verbalized understanding of the critical result.      I reviewed the above imaging report today.    Impression/plan:    Leg swelling  -Ultrasound today shows a left peroneal DVT. Will start him on Eliquis. Advised to monitor for any bleeding issues and notify us. Patient started on nifedipine on 9/15, dose increased on 10/2. Discussed this medication can also contribute to leg swelling. Discussed the use of compression stockings and leg elevation. Given rx for compression stockings. Will also notify his nephrologist of the leg swelling.     HCC  -started sorafenib 400 mg BID on 7/29.  Tolerated the first several days well, except for some diarrhea. Then developed acute N/V 4 days prior to admission for renal failure and hyperkalemia. T  -he had an extended break off of therapy as he was traveling over the labor day weekend. He resumed 200 mg daily on 9/8.   -dose was increased to 200 mg bid on 9/21/23  -Diarrhea is managed with Imodium.  -Increased sorafenib on 10/6/23 to 200 mg in AM and 400 mg in the PM  -He has follow-up with Brenda Wilson DNP, next week. If stable, consider increasing to 400 mg bid. Will need to review CrCl guidelines with his next labs prior to increasing.  -repeat CT imaging planned at the end of November    Thrombocytopenia, likely s/t sorafenib, no evidence of HIT on VAHID.   -Last platelet check was normal on 10/5/23.    Hx of liver transplant  -immunosuppression with MMF, prednisone as per Dr. Banuelos    CAD s/p 2 vessel CABG 2021  HTN  CKD  -following with  and Dr. Bhatt  -on metoprolol, recently started on nifedipine 60 mg bid  -BP is okay here today, but has had some high's at home    Sara Kennedy PA-C  [facility]  [address], [place] [id number] [phone]    30 minutes spent on the date of the encounter doing chart review, review of test results, interpretation of tests, patient visit, and documentation

## 2023-10-11 NOTE — NURSING NOTE
"Oncology Rooming Note    October 11, 2023 11:13 AM   Frandy Workman is a 58 year old male who presents for:    Chief Complaint   Patient presents with    Oncology Clinic Visit     HCC (hepatocellular carcinoma)      Initial Vitals: /81 (BP Location: Left arm, Patient Position: Sitting, Cuff Size: Adult Regular)   Pulse 104   Temp 98.2  F (36.8  C) (Oral)   Resp 20   Wt 80.7 kg (178 lb)   SpO2 96%   BMI 26.29 kg/m   Estimated body mass index is 26.29 kg/m  as calculated from the following:    Height as of 10/6/23: 1.753 m (5' 9\").    Weight as of this encounter: 80.7 kg (178 lb). Body surface area is 1.98 meters squared.  Extreme Pain (8) Comment: calf (8), shin (5)   No LMP for male patient.  Allergies reviewed: Yes  Medications reviewed: Yes    Medications: Medication refills not needed today.  Pharmacy name entered into [facility]:    [facility] - [place], [place] - [address] [place] [address] [facility] [place] [place] [facility] [address] [place] [address] [phone]  [id number] [place] [address]    Clinical concerns: Pt is experiencing pain in their calves and shin. Calves rating of 8, shin rating of 5. Pain gets better with movement when patient walks around.        Alexandra Quick              "

## 2023-10-11 NOTE — TELEPHONE ENCOUNTER
Situation: Pt is calling regarding concerns for Edema    Background:   Treating Provider:   /Brenda Wilson    Date of last office visit: 10/06/23 Rodger Wilson    Next scheduled appt: 10/20/23 Rodger Wilson    Is patient on active treatment: (if yes, when and drug): Yes: Sorafenib    Does patient take anticoagulation medication: NO      Assessment of Symptoms:  Onset of symptoms: Wood 10/08/23     Edema, blood pressure and blood sugar have all increased with the new dose of Sorafenin to 400 mg. Pt states he has reached out to his cardiologist, nephrologist and endocrinologist regarding his BP and blood sugar readings.    Pt states pain is increasing. Pain is located in both calves. Left calf hurts more than right. Currently rates 5/10, this morning it was a 10/10. Describes pain as sharp/dull constant. Pain is worse when he is laying down/resting. Pain improves if he is moving around/active.  Pt states this pain is ongoing and was experiencing it when he was on 200 mg but its just worsening now.    Pt states he does not like to take pain medication, even tylenol.    Pt does see a chiropractor to help with leg pain and it helps for a short amount time. This has offered less relief since the dose as increased.    Pt wondering if the dose of Sorafenin needs to be adjusted.    Recommendations:   [id number] Secure message sent to Brenda Wilson  1326 Brenda recommending pt be seen by SERA today and get an US of BLE to evaluate for DVT.  [id number] Contacted pt and informed him of available appt at 11am with Sara Kennedy PA-C. Informed pt he will also need US before provider appt to rule out DVT. Pt verbalized understanding and stated he would wait for scheduling to call him to assist with scheduling appts.

## 2023-10-11 NOTE — LETTER
10/11/2023         RE: Frandy Workman  [address] [facility] [address]        Dear Colleague,    Thank you for referring your patient, Frandy Workman, to the [facility]. Please see a copy of my visit note below.    Oncology Follow Up Note:  Oct 11, 2023    Reason for Visit: seen in follow-up of recurrent hepatocellular carcinoma, evaluation of leg swelling    Oncology HPI:   Miller Workman is a 58 year old man with a PMH of DMII, bipolar 1 disorder, depressed with LIZZETTE, cirrhosis s/t ESTRADA with subsequent development of HCC, s/p liver transplant , HLD, HTN, GERD, BPH and CAD with hx of 2 vessel CABG in July 2021, CKD with anemia of chronic disease, on erythropoetin.      He as diagnosed with hepatocellular carcinoma in December 2018 when he was found to have 2 LIRADS 5 lesions on surveillance imaging. He underwent TACE on 1/22/19.  He is s/p  liver transplant on 11/11/2019. The explanted liver had 2 lesions that were mostly necrotic and less than 3 cm with no clearly identifiable vascular invasion.      He was found to have peritoneal masses on routine surveillance imaging in June 2023. He underwent laparoscopy with biopsies and pericecal mass resection confirming metastatic HCC.     He met with Dr. Villarreal in July with recommendation to start sorafenib. He started 400 mg BID on 7/29/23.     He was admitted 8/10-8/16 with hyperkalemia, acute kidney injury. He required one round of hemodialysis. He was noted to have thrombocytopenia with a platelet count from 178 t9 97. A HIT VAHID was negative.   Renal function and hyperkalemia improved through the course of hospitalization. He was followed by nephrology. He was drinking 1.6 L of fluid.  Jardiance and lisinopril held. The cause of the JERONIMO was in the context of severe nausea and vomiting and diarrhea prior to admission.      He resumed sorafenib 200 mg/day on 9/12/23.  He had repeat CT imaging when he saw  on 9/21/23 that  was stable. He was recommended to continue sorafenib, the dose was increased to 200 mg bid, then increased further to 200/400 mg bid on 10/6/23.     He is here today for assessment of leg swelling.     Interval history:   Patient reports that he has been having intermittent pain in his legs since first starting on saracatinib.  However he has noticed this pain has been more bothersome with rest and decreases with movement.  He was able to go on his normal 3-1/2 mile walk this morning.  He has noticed new swelling in his ankles and legs over the last 24 hours or so.  He denies any chest pain or shortness of breath.  He also notes that he has developed cold symptoms with some congestion over the last few days.  He has found relief with the use of Mucinex.  He denies other concerns.    Current Outpatient Medications   Medication Sig Dispense Refill    Alcohol Swabs (ALCOHOL PREP) 70 % PADS Use for glucose testing 4x daily  and insulin administration 4x daily. 400 each 1    ammonium lactate (AMLACTIN) 12 % external cream Apply topically daily as needed for dry skin (on feet) 140 g 5    aspirin (SM ASPIRIN ADULT LOW STRENGTH) 81 MG EC tablet Take 2 tablets (162 mg) by mouth daily 180 tablet 3    BD VIKTORIA U/F 32G X 4 MM insulin pen needle Use 5 per day 300 each 3    benzoyl peroxide 5 % external liquid Use topically in showers as a body wash 226 g 11    blood glucose (NO BRAND SPECIFIED) lancets standard Use to test blood sugar 1 times daily or as directed. 100 each 11    Continuous Blood Gluc Sensor (FREESTYLE CLAUDIA 2 SENSOR) MISC 1 each See Admin Instructions Change every 14 days. 7 each 3    insulin aspart (NOVOLOG PEN) 100 UNIT/ML pen Inject 5-10 Units Subcutaneous 4 times daily (with meals and nightly) 1unit : 8 g carb before meals.  Also add 1 unit : 50 mg/dl >180 before meals and at bedtime. 45 mL 3    insulin degludec (TRESIBA FLEXTOUCH) 100 UNIT/ML pen Inject 15 units subcutaneous once daily 45 mL 3     ketoconazole (NIZORAL) 2 % external shampoo Apply thin layer topically to scalp in shower (leave on 5 min prior to rinse); may also use as a body wash 120 mL 11    lamiVUDine (EPIVIR) 100 MG tablet Take 1 tablet (100 mg) by mouth daily 90 tablet 3    loperamide (IMODIUM A-D) 2 MG tablet Take 1-2 tablets (2-4 mg) by mouth 4 times daily as needed for diarrhea 120 tablet 3    metoprolol succinate ER (TOPROL XL) 25 MG 24 hr tablet Take 1 tablet (25 mg) by mouth daily 45 tablet 1    Multiple Vitamin (TAB-A-GLADIS) TABS TAKE ONE TABLET BY MOUTH ONCE DAILY 90 tablet 0    mycophenolate (GENERIC EQUIVALENT) 250 MG capsule Take 2 capsules (500 mg) by mouth every 12 hours 120 capsule 11    NIFEdipine ER OSMOTIC (PROCARDIA XL) 60 MG 24 hr tablet Take 1 tablet (60 mg) by mouth 2 times daily 60 tablet 11    ondansetron (ZOFRAN ODT) 8 MG ODT tab Take 1 tablet (8 mg) by mouth every 8 hours as needed for nausea 30 tablet 3    pantoprazole (PROTONIX) 40 MG EC tablet Take 1 tablet (40 mg) by mouth daily before breakfast 90 tablet 3    predniSONE (DELTASONE) 5 MG tablet Take 1 tablet (5 mg) by mouth daily 90 tablet 3    rosuvastatin (CRESTOR) 20 MG tablet Take 1 tablet (20 mg) by mouth daily 90 tablet 3    SORAfenib (NEXAVAR) 200 MG tablet Take 200 mg in AM and 400 mg in PM on an empty stomach 1 hour before or 2 hours after a meal. Meet with oncology in 2 weeks to discuss ramp up to 400 mg twice daily 120 tablet 0    tamsulosin (FLOMAX) 0.4 MG capsule Take 1 capsule (0.4 mg) by mouth daily 90 capsule 3    Vitamin D3 50 mcg (2000 units) tablet Take 1 tablet (50 mcg) by mouth daily 90 tablet 3       Physical Exam:  General: The patient is a pleasant male in no acute distress.  /81 (BP Location: Left arm, Patient Position: Sitting, Cuff Size: Adult Regular)   Pulse 104   Temp 98.2  F (36.8  C) (Oral)   Resp 20   Wt 80.7 kg (178 lb)   SpO2 96%   BMI 26.29 kg/m    Wt Readings from Last 10 Encounters:   10/11/23 80.7 kg (178 lb)    10/06/23 78 kg (172 lb)   09/15/23 81.6 kg (179 lb 12.8 oz)   09/12/23 82.3 kg (181 lb 8 oz)   08/29/23 83.2 kg (183 lb 6.4 oz)   08/25/23 83.3 kg (183 lb 9.6 oz)   08/15/23 83.1 kg (183 lb 4.8 oz)   08/10/23 82.6 kg (182 lb)   08/09/23 84.9 kg (187 lb 3.2 oz)   07/26/23 88.5 kg (195 lb)   HEENT: EOMI. Sclerae are anicteric.   Heart: Regular rate and rhythm.   Lungs: Clear to auscultation bilaterally.   Extremities: 1+ lower extremity edema noted bilaterally in the ankles and shins, left slightly larger than right, tenderness noted bilaterally anterior to the medial malleoli.   Neuro: Cranial nerves II through XII are grossly intact.    Labs:   Most Recent 3 CBC's:  Recent Labs   Lab Test 10/05/23  1013 09/19/23  0923 09/07/23  1014   WBC 4.6 4.7 4.9   HGB 10.9* 10.9* 11.5*   MCV 99 100 98    199 220   ANEUTAUTO 3.5 3.7 3.5     Most Recent 3 BMP's:  Recent Labs   Lab Test 10/05/23  1013 09/19/23  0923 09/07/23  1014     137 138 138   POTASSIUM 4.7  4.6 4.9 4.5   CHLORIDE 101  101 102 103   CO2 24  25 24 25   BUN 26.5*  26.7* 24.1* 33.5*   CR 1.35*  1.36* 1.35* 1.50*   ANIONGAP 12  11 12 10   DEBORAH 10.2*  10.3* 9.7 10.0   *  169* 143* 101*   PROTTOTAL 7.1  7.2 7.2 7.5   ALBUMIN 4.5  4.5 4.6 4.9    Most Recent 3 LFT's:  Recent Labs   Lab Test 10/05/23  1013 09/19/23  0923 09/07/23  1014   AST 21  21 19 20   ALT 21  22 18 19   ALKPHOS 101  102 100 86   BILITOTAL 0.5  0.5 0.6 0.5    Most Recent 2 TSH and T4:  Recent Labs   Lab Test 04/25/22  0925 10/04/21  0802 01/24/20  0837 08/08/18  1246   TSH 1.24 1.90   < > 0.49   T4  --   --   --  1.21    < > = values in this interval not displayed.     I reviewed the above labs today.    Imaging:  US Lower Extremity Venous Duplex Bilateral  RESIDENT PRELIMINARY INTERPRETATION  IMPRESSION:  1.  Acute deep venous thrombosis in one of the paired left peroneal  veins.  2.  No deep venous thrombosis in the right lower extremity.    [Critical Result:  Acute left DVT]    Finding was identified on 10/11/2023 12:29 PM.     Brenda Wilson was contacted by Dr. Bond on 10/11/2023 12:31 PM  and verbalized understanding of the critical result.      I reviewed the above imaging report today.    Impression/plan:   Leg swelling  -Ultrasound today shows a left peroneal DVT. Will start him on Eliquis. Advised to monitor for any bleeding issues and notify us. Patient started on nifedipine on 9/15, dose increased on 10/2. Discussed this medication can also contribute to leg swelling. Discussed the use of compression stockings and leg elevation. Given rx for compression stockings. Will also notify his nephrologist of the leg swelling.     HCC  -started sorafenib 400 mg BID on 7/29.  Tolerated the first several days well, except for some diarrhea. Then developed acute N/V 4 days prior to admission for renal failure and hyperkalemia. T  -he had an extended break off of therapy as he was traveling over the labor day weekend. He resumed 200 mg daily on 9/8.   -dose was increased to 200 mg bid on 9/21/23  -Diarrhea is managed with Imodium.  -Increased sorafenib on 10/6/23 to 200 mg in AM and 400 mg in the PM  -He has follow-up with Brenda Wilson DNP, next week. If stable, consider increasing to 400 mg bid. Will need to review CrCl guidelines with his next labs prior to increasing.  -repeat CT imaging planned at the end of November    Thrombocytopenia, likely s/t sorafenib, no evidence of HIT on VAHID.   -Last platelet check was normal on 10/5/23.    Hx of liver transplant  -immunosuppression with MMF, prednisone as per Dr. Banuelos    CAD s/p 2 vessel CABG 2021  HTN  CKD  -following with  and Dr. Bhatt  -on metoprolol, recently started on nifedipine 60 mg bid  -BP is okay here today, but has had some high's at home    Sara Kennedy PA-C  [facility] [id number] [place] [address] [phone]    30 minutes spent on the date of the encounter doing  chart review, review of test results, interpretation of tests, patient visit, and documentation

## 2023-10-12 ENCOUNTER — ALLIED HEALTH/NURSE VISIT (OUTPATIENT)
Dept: OTHER | Facility: CLINIC | Age: 58
End: 2023-10-12
Payer: MEDICARE

## 2023-10-12 VITALS
DIASTOLIC BLOOD PRESSURE: 76 MMHG | TEMPERATURE: 97.3 F | SYSTOLIC BLOOD PRESSURE: 146 MMHG | HEART RATE: 100 BPM | OXYGEN SATURATION: 100 %

## 2023-10-12 DIAGNOSIS — I10 BENIGN ESSENTIAL HYPERTENSION: Primary | ICD-10-CM

## 2023-10-12 PROCEDURE — 99207 PR COMMUNITY PARAMEDIC - PATIENT NOT BILLABLE: CPT

## 2023-10-12 ASSESSMENT — ACTIVITIES OF DAILY LIVING (ADL): DEPENDENT_IADLS:: INDEPENDENT

## 2023-10-12 NOTE — PROGRESS NOTES
Community Paramedics Follow-up Visit  October 12, 2023  TIME: 1200    Frandy Workman is a 58 year old male being seen at home for a follow-up visit.    Present at appointment:  patient         Chief Complaint   Patient presents with    Community Paramedic-Home Visit    Recheck Medication       Universal Utilization:      Utilization      No Show Count (past year)  1             ED Visits  1             Hospital Admissions  2                    Current as of: 10/11/2023  2:08 PM                BP (!) 146/76   Pulse 100   Temp 97.3  F (36.3  C)   SpO2 100%     Clinical Concerns:  Current Medical Concerns:  Cancer, diabetes, HTN    Current Behavioral Concerns: no    Education Provided to patient: no   Medication set up? Yes  Pill Box issued: No  Scale issued: No  Flu Shot given: No  COVID Vaccine Given: No  Lab draw or specimen collection: No  Food resource given: No  Collaborative visit with PCP: No  Wound Care: No  Health Maintenance Addressed Yes    Clinical Pathway: None    No  Face to Face in Home / Community        Review of Symptoms/PE    Skin: negative  Eyes: negative  Ears/Nose/Throat: nasal congestion, postnasal drainage, persistent sore throat  Respiratory: No shortness of breath, dyspnea on exertion, cough, or hemoptysis  Cardiovascular: negative  Gastrointestinal: negative  Genitourinary: negative  Musculoskeletal: negative  Neurologic: negative  Psychiatric: negative    Pain Management::                 Plan:     Time spent with patient: 45    The patient meets one or more of the following criteria:  * Requires services to prevent readmission to a nursing home or hospital      Next CP visit scheduled: none    Issues for Provider to follow up on:   Some of Miller's medications were set up in pill packs by Jocelyn.  I set up the rest of them at his direction.  Miller is confident he is able to set up the remaining medications as needed.    Miller is complaining of cold like symptoms.  Negative for fever, cough,  SOB.  Endorses lethargy, running nose, sore throat..    I asked Miller if there was anything else I might be able to help him with and he asked about seeing a Psychiatrist about his bipolar diagnosis.  He disagrees with the diagnosis and would like it taken off of his medical record.  I encouraged him to talk to his PCP about this matter to which he said he will.    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  Please place a new referral if needs arise.    Provider follow up visit needed: Multiple upcoming

## 2023-10-16 ENCOUNTER — VIRTUAL VISIT (OUTPATIENT)
Dept: TRANSPLANT | Facility: CLINIC | Age: 58
End: 2023-10-16
Attending: INTERNAL MEDICINE
Payer: MEDICARE

## 2023-10-16 DIAGNOSIS — Z94.4 LIVER REPLACED BY TRANSPLANT (H): ICD-10-CM

## 2023-10-16 DIAGNOSIS — N18.31 CHRONIC KIDNEY DISEASE, STAGE 3A (H): ICD-10-CM

## 2023-10-16 PROCEDURE — 97802 MEDICAL NUTRITION INDIV IN: CPT | Mod: 95 | Performed by: DIETITIAN, REGISTERED

## 2023-10-16 NOTE — PROGRESS NOTES
"Pt evaluated via billable telephone visit. Time spent: 30 min  Provider location: [facility]  Outpatient MNT: Post Liver Transplant    Time Spent: 30 minutes  Visit Type: Initial   Referring Physician: Santi Banuelos   Pt accompanied by: self     Txp hx: liver txp 11/2019     Nutrition Assessment/Diet Recall  H/o DM II (target 110-180, reports 98% in range), CKD III  Needed dialysis earlier this summer. He reports being on chemo since June, which is impacting kidneys as well. However, reports unknown etiology for ESRD earlier this year.   Since then, he reports being more cognizant of diet, eating out less (prior caretaker was giving him fast food more, which has now stopped), increasing fluid intake, tc.     Weight hx: Pt reports + LEILA, although sounds like it might be from a blood clot?  230 lbs Jan 2023-->171 lbs   Weighed 203 lbs at start of chemo in June  Pt reports still being \"overweight\"  Wt Readings from Last 10 Encounters:   10/11/23 80.7 kg (178 lb)   10/06/23 78 kg (172 lb)   09/15/23 81.6 kg (179 lb 12.8 oz)   09/12/23 82.3 kg (181 lb 8 oz)   08/29/23 83.2 kg (183 lb 6.4 oz)   08/25/23 83.3 kg (183 lb 9.6 oz)   08/15/23 83.1 kg (183 lb 4.8 oz)   08/10/23 82.6 kg (182 lb)   08/09/23 84.9 kg (187 lb 3.2 oz)   07/26/23 88.5 kg (195 lb)     Diet Recall  Breakfast Egg white, spinach, feta, tomato BF wrap ([employer])- 840 mg Na   Lunch Heavier meal/business lunch most days of the week- chicken (Japanese) w/ a sauce (does report using lower Na soy sauce)   Dinner Lighter meal- egg salad + black pepper w/ Hawaiian roll   Snacks Occasional popcorn    Beverages [employer] refresher drink, water (128 oz so far today- goal is 200 oz/day), protein shake made at a local hotel using protein powder (4 days/week) OR a protein bar (Atkins-15 g protein), Body Armor Lyte   Alcohol Not asked   Dining out 2 out of 3 meals/day lately      Physical Activity  Walks 3.5 miles in the " AM  Walks 2-3 miles in the PM  Habit since 3/2023     Labs  A1c 6.3 (June)  eGFR 60 (10/5), up from <10 in August     Anthropometrics  IBW/%IBW: 160/111  Dosing wt: 178 lbs/81 kg    Estimated Nutrition Needs   Protein: ~65 grams/day (0.8 g/kg) for maintenance with CKD    Nutrition Diagnosis  No nutrition dx identified at this time    Nutrition Intervention  - Monitor dietary Na intake for CKD. Reviewed sodium content of [employer] wrap. Likely getting more sodium at lunch due to dining out daily.   - Reviewed importance of adequate protein intake, not too much or too little. Encouraged him to track protein intake and assess for any adjustments that need to be made. May not need to do as many protein supplements.   - Reviewed some healthy fats, calories from fat, etc if he continues to lose weight. Consider avocado, full fat yogurt, nuts/seeds/nut butters, cheese, hummus, etc.     Reviewed importance of food safety and increased risk for food-borne illness post-txp. Also discussed potential need to monitor K+, CHO, and Na+ intakes d/t medication side effects.     Instructed pt to avoid herbal supplements and alternative medicine therapies d/t interaction with immunosuppression and risk for rejection.    Patient Understanding: Pt verbalized understanding of education provided.  Expected Engagement: Good  Follow-Up Plans: PRN     Nutrition Goals  No nutrition goals identified at this time    Alisha Santiago, RD, LD, CCTD

## 2023-10-16 NOTE — LETTER
"    10/16/2023         RE: Frandy Workman  [address] [facility] [address]        Dear Colleague,    Thank you for referring your patient, Frandy Workman, to the [facility]. Please see a copy of my visit note below.    Pt evaluated via billable telephone visit. Time spent: 30 min  Provider location: offsite     [facility]  Outpatient MNT: Post Liver Transplant    Time Spent: 30 minutes  Visit Type: Initial   Referring Physician: Santi Banuelos   Pt accompanied by: self     Txp hx: liver txp 11/2019     Nutrition Assessment/Diet Recall  H/o DM II (target 110-180, reports 98% in range), CKD III  Needed dialysis earlier this summer. He reports being on chemo since June, which is impacting kidneys as well. However, reports unknown etiology for ESRD earlier this year.   Since then, he reports being more cognizant of diet, eating out less (prior caretaker was giving him fast food more, which has now stopped), increasing fluid intake, tc.     Weight hx: Pt reports + LEILA, although sounds like it might be from a blood clot?  230 lbs Jan 2023-->171 lbs   Weighed 203 lbs at start of chemo in June  Pt reports still being \"overweight\"  Wt Readings from Last 10 Encounters:   10/11/23 80.7 kg (178 lb)   10/06/23 78 kg (172 lb)   09/15/23 81.6 kg (179 lb 12.8 oz)   09/12/23 82.3 kg (181 lb 8 oz)   08/29/23 83.2 kg (183 lb 6.4 oz)   08/25/23 83.3 kg (183 lb 9.6 oz)   08/15/23 83.1 kg (183 lb 4.8 oz)   08/10/23 82.6 kg (182 lb)   08/09/23 84.9 kg (187 lb 3.2 oz)   07/26/23 88.5 kg (195 lb)     Diet Recall  Breakfast Egg white, spinach, feta, tomato BF wrap ([employer])- 840 mg Na   Lunch Heavier meal/business lunch most days of the week- chicken (Japanese) w/ a sauce (does report using lower Na soy sauce)   Dinner Lighter meal- egg salad + black pepper w/ Hawaiian roll   Snacks Occasional popcorn    Beverages [employer] refresher drink, water (128 oz so far today- goal is " 200 oz/day), protein shake made at a local hotel using protein powder (4 days/week) OR a protein bar (Atkins-15 g protein), Body Armor Lyte   Alcohol Not asked   Dining out 2 out of 3 meals/day lately      Physical Activity  Walks 3.5 miles in the AM  Walks 2-3 miles in the PM  Habit since 3/2023     Labs  A1c 6.3 (June)  eGFR 60 (10/5), up from <10 in August     Anthropometrics  IBW/%IBW: 160/111  Dosing wt: 178 lbs/81 kg    Estimated Nutrition Needs   Protein: ~65 grams/day (0.8 g/kg) for maintenance with CKD    Nutrition Diagnosis  No nutrition dx identified at this time    Nutrition Intervention  - Monitor dietary Na intake for CKD. Reviewed sodium content of [employer] wrap. Likely getting more sodium at lunch due to dining out daily.   - Reviewed importance of adequate protein intake, not too much or too little. Encouraged him to track protein intake and assess for any adjustments that need to be made. May not need to do as many protein supplements.   - Reviewed some healthy fats, calories from fat, etc if he continues to lose weight. Consider avocado, full fat yogurt, nuts/seeds/nut butters, cheese, hummus, etc.     Reviewed importance of food safety and increased risk for food-borne illness post-txp. Also discussed potential need to monitor K+, CHO, and Na+ intakes d/t medication side effects.     Instructed pt to avoid herbal supplements and alternative medicine therapies d/t interaction with immunosuppression and risk for rejection.    Patient Understanding: Pt verbalized understanding of education provided.  Expected Engagement: Good  Follow-Up Plans: PRN     Nutrition Goals  No nutrition goals identified at this time    Alisha Santiago, RD, LD, CCTD

## 2023-10-17 DIAGNOSIS — I12.9 HYPERTENSION, RENAL: Primary | ICD-10-CM

## 2023-10-17 RX ORDER — LISINOPRIL 5 MG/1
5 TABLET ORAL DAILY
Qty: 90 TABLET | Refills: 3 | Status: SHIPPED | OUTPATIENT
Start: 2023-10-17

## 2023-10-17 NOTE — PATIENT INSTRUCTIONS
Manny Bhatt,  Nice talking with you.   The big things for the renal diet that I recommend you consider are:  - Reduced sodium intake. This would be 2000 mg/day for you. Do your best when dining out to ask for no added salt, look at the menu/nutrition in advance (if it is a chain restaurant, etc). For example, your Holly breakfast is 840 mg sodium per their website. This can fit into your daily budget, but keep an eye on the other foods you consume during the day.   - Track your daily protein intake for a few days. I would aim for ~65 grams/day, but if you exceed this by much on a regular basis, it could reduce your kidney function.   - If your weight loss doesn't slow down and you are concerned, look to add more calories from healthy fats instead of junk foods. This could be full fat/whole milk yogurt, cheese sticks, nuts/nut butters, avocado/guacamole, hummus, etc.     Sources of Protein  For animal proteins (chicken, beef, fish), deck of cards size is approximately 3 ounces or 24 grams protein.  Food Portion  Grams of Protein   Animal proteins (chicken, turkey, venison, fish/seafood, red meat) 1 ounce  7-9   Egg  1  6   Cottage cheese  1/4 cup  7    Cheese  1 ounce (1 slice, 1 cheese stick) 6   Milk (cow's) 4 ounces or 1/2 cup  4   Dry skim milk powder 2 Tbsp  4   Ricotta cheese  1/4 cup  7    [employer] Instant Breakfast (made with milk) 1/2 cup  7   Pudding 1/2 cup  4   Yogurt (ie Yoplait) 1/2 cup  5   Greek yogurt 5 oz container 15   Tofu  1/4 cup  5   Soymilk  1/2 cup  3   Tempeh  1/4 cup  8   Lentils  1/4 cup 5   Kidney beans, black beans, etc 1/4 cup  4   Chickpeas 1/4 cup  4   Veggie burger  1 francine  7   Soy nuts  1/4 cup  17   Sunflower seeds  2 Tbsp  8   Peanuts  1 ounce 7   Almonds  15 6   Pistachios 25 6   Peanut/almond butter 2 Tbsp  8      Protein bars, ready-to-drink products (ie Premier Protein, Boost, Ensure, etc), or protein powders are other options to supplement your protein intake.

## 2023-10-19 ENCOUNTER — LAB (OUTPATIENT)
Dept: LAB | Facility: CLINIC | Age: 58
End: 2023-10-19
Payer: MEDICARE

## 2023-10-19 ENCOUNTER — VIRTUAL VISIT (OUTPATIENT)
Dept: BEHAVIORAL HEALTH | Facility: CLINIC | Age: 58
End: 2023-10-19
Payer: MEDICARE

## 2023-10-19 DIAGNOSIS — F41.1 GENERALIZED ANXIETY DISORDER: ICD-10-CM

## 2023-10-19 DIAGNOSIS — R19.7 DIARRHEA, UNSPECIFIED TYPE: ICD-10-CM

## 2023-10-19 DIAGNOSIS — C22.0 HCC (HEPATOCELLULAR CARCINOMA) (H): ICD-10-CM

## 2023-10-19 DIAGNOSIS — R11.2 NAUSEA AND VOMITING, UNSPECIFIED VOMITING TYPE: ICD-10-CM

## 2023-10-19 DIAGNOSIS — F31.31 BIPOLAR 1 DISORDER, DEPRESSED, MILD (H): Primary | ICD-10-CM

## 2023-10-19 DIAGNOSIS — C78.6 MALIGNANT NEOPLASM METASTATIC TO PERITONEUM (H): ICD-10-CM

## 2023-10-19 LAB
ALBUMIN SERPL BCG-MCNC: 4.1 G/DL (ref 3.5–5.2)
ALP SERPL-CCNC: 102 U/L (ref 40–129)
ALT SERPL W P-5'-P-CCNC: 23 U/L (ref 0–70)
ANION GAP SERPL CALCULATED.3IONS-SCNC: 9 MMOL/L (ref 7–15)
AST SERPL W P-5'-P-CCNC: 28 U/L (ref 0–45)
BASO+EOS+MONOS # BLD AUTO: ABNORMAL 10*3/UL
BASO+EOS+MONOS NFR BLD AUTO: ABNORMAL %
BASOPHILS # BLD AUTO: 0 10E3/UL (ref 0–0.2)
BASOPHILS NFR BLD AUTO: 0 %
BILIRUB SERPL-MCNC: 0.4 MG/DL
BUN SERPL-MCNC: 25.3 MG/DL (ref 8–23)
CALCIUM SERPL-MCNC: 8.7 MG/DL (ref 8.6–10)
CHLORIDE SERPL-SCNC: 109 MMOL/L (ref 98–107)
CREAT SERPL-MCNC: 1.28 MG/DL (ref 0.67–1.17)
DEPRECATED HCO3 PLAS-SCNC: 23 MMOL/L (ref 22–29)
EGFRCR SERPLBLD CKD-EPI 2021: 64 ML/MIN/1.73M2
EOSINOPHIL # BLD AUTO: 0 10E3/UL (ref 0–0.7)
EOSINOPHIL NFR BLD AUTO: 1 %
ERYTHROCYTE [DISTWIDTH] IN BLOOD BY AUTOMATED COUNT: 15.4 % (ref 10–15)
GLUCOSE SERPL-MCNC: 105 MG/DL (ref 70–99)
HCT VFR BLD AUTO: 33.4 % (ref 40–53)
HGB BLD-MCNC: 10.8 G/DL (ref 13.3–17.7)
IMM GRANULOCYTES # BLD: 0 10E3/UL
IMM GRANULOCYTES NFR BLD: 1 %
LYMPHOCYTES # BLD AUTO: 0.7 10E3/UL (ref 0.8–5.3)
LYMPHOCYTES NFR BLD AUTO: 11 %
MAGNESIUM SERPL-MCNC: 1.8 MG/DL (ref 1.7–2.3)
MCH RBC QN AUTO: 31.9 PG (ref 26.5–33)
MCHC RBC AUTO-ENTMCNC: 32.3 G/DL (ref 31.5–36.5)
MCV RBC AUTO: 99 FL (ref 78–100)
MONOCYTES # BLD AUTO: 0.3 10E3/UL (ref 0–1.3)
MONOCYTES NFR BLD AUTO: 4 %
NEUTROPHILS # BLD AUTO: 5.3 10E3/UL (ref 1.6–8.3)
NEUTROPHILS NFR BLD AUTO: 83 %
NRBC # BLD AUTO: 0 10E3/UL
NRBC BLD AUTO-RTO: 0 /100
PHOSPHATE SERPL-MCNC: 1.9 MG/DL (ref 2.5–4.5)
PLATELET # BLD AUTO: 125 10E3/UL (ref 150–450)
POTASSIUM SERPL-SCNC: 4.7 MMOL/L (ref 3.4–5.3)
PROT SERPL-MCNC: 6.7 G/DL (ref 6.4–8.3)
RBC # BLD AUTO: 3.39 10E6/UL (ref 4.4–5.9)
SODIUM SERPL-SCNC: 141 MMOL/L (ref 135–145)
WBC # BLD AUTO: 6.3 10E3/UL (ref 4–11)

## 2023-10-19 PROCEDURE — 85025 COMPLETE CBC W/AUTO DIFF WBC: CPT | Performed by: PATHOLOGY

## 2023-10-19 PROCEDURE — 90834 PSYTX W PT 45 MINUTES: CPT | Mod: 95 | Performed by: PSYCHOLOGIST

## 2023-10-19 PROCEDURE — 80053 COMPREHEN METABOLIC PANEL: CPT | Performed by: PATHOLOGY

## 2023-10-19 PROCEDURE — 83735 ASSAY OF MAGNESIUM: CPT | Performed by: PATHOLOGY

## 2023-10-19 PROCEDURE — 84100 ASSAY OF PHOSPHORUS: CPT | Performed by: PATHOLOGY

## 2023-10-19 PROCEDURE — 36415 COLL VENOUS BLD VENIPUNCTURE: CPT | Performed by: PATHOLOGY

## 2023-10-19 ASSESSMENT — SLEEP AND FATIGUE QUESTIONNAIRES
HOW LIKELY ARE YOU TO NOD OFF OR FALL ASLEEP WHILE SITTING AND TALKING TO SOMEONE: WOULD NEVER DOZE
HOW LIKELY ARE YOU TO NOD OFF OR FALL ASLEEP IN A CAR, WHILE STOPPED FOR A FEW MINUTES IN TRAFFIC: WOULD NEVER DOZE
HOW LIKELY ARE YOU TO NOD OFF OR FALL ASLEEP WHILE SITTING INACTIVE IN A PUBLIC PLACE: SLIGHT CHANCE OF DOZING
HOW LIKELY ARE YOU TO NOD OFF OR FALL ASLEEP WHILE SITTING QUIETLY AFTER LUNCH WITHOUT ALCOHOL: WOULD NEVER DOZE
HOW LIKELY ARE YOU TO NOD OFF OR FALL ASLEEP WHEN YOU ARE A PASSENGER IN A CAR FOR AN HOUR WITHOUT A BREAK: WOULD NEVER DOZE
HOW LIKELY ARE YOU TO NOD OFF OR FALL ASLEEP WHILE LYING DOWN TO REST IN THE AFTERNOON WHEN CIRCUMSTANCES PERMIT: SLIGHT CHANCE OF DOZING
HOW LIKELY ARE YOU TO NOD OFF OR FALL ASLEEP WHILE SITTING AND READING: SLIGHT CHANCE OF DOZING
HOW LIKELY ARE YOU TO NOD OFF OR FALL ASLEEP WHILE WATCHING TV: SLIGHT CHANCE OF DOZING

## 2023-10-20 ENCOUNTER — VIRTUAL VISIT (OUTPATIENT)
Dept: ONCOLOGY | Facility: CLINIC | Age: 58
End: 2023-10-20
Attending: NURSE PRACTITIONER
Payer: MEDICARE

## 2023-10-20 ENCOUNTER — LAB (OUTPATIENT)
Dept: LAB | Facility: CLINIC | Age: 58
End: 2023-10-20
Payer: MEDICARE

## 2023-10-20 VITALS — WEIGHT: 170 LBS | HEIGHT: 69 IN | BODY MASS INDEX: 25.18 KG/M2

## 2023-10-20 DIAGNOSIS — C78.6 MALIGNANT NEOPLASM METASTATIC TO PERITONEUM (H): ICD-10-CM

## 2023-10-20 DIAGNOSIS — J06.9 VIRAL UPPER RESPIRATORY TRACT INFECTION: ICD-10-CM

## 2023-10-20 DIAGNOSIS — I82.452 ACUTE DEEP VEIN THROMBOSIS (DVT) OF LEFT PERONEAL VEIN (H): ICD-10-CM

## 2023-10-20 DIAGNOSIS — E83.39 HYPOPHOSPHATEMIA: ICD-10-CM

## 2023-10-20 DIAGNOSIS — C22.0 HCC (HEPATOCELLULAR CARCINOMA) (H): Primary | ICD-10-CM

## 2023-10-20 LAB — RSV AG SPEC QL: POSITIVE

## 2023-10-20 PROCEDURE — 99000 SPECIMEN HANDLING OFFICE-LAB: CPT | Performed by: PATHOLOGY

## 2023-10-20 PROCEDURE — 87807 RSV ASSAY W/OPTIC: CPT | Performed by: NURSE PRACTITIONER

## 2023-10-20 PROCEDURE — 99215 OFFICE O/P EST HI 40 MIN: CPT | Mod: 95 | Performed by: NURSE PRACTITIONER

## 2023-10-20 PROCEDURE — 87635 SARS-COV-2 COVID-19 AMP PRB: CPT | Performed by: NURSE PRACTITIONER

## 2023-10-20 RX ORDER — SODIUM PHOSPHATE, DIBASIC, ANHYDROUS, POTASSIUM PHOSPHATE, MONOBASIC, AND SODIUM PHOSPHATE, MONOBASIC, MONOHYDRATE 852; 155; 130 MG/1; MG/1; MG/1
1 TABLET, COATED ORAL 4 TIMES DAILY
Qty: 120 TABLET | Refills: 1 | Status: SHIPPED | OUTPATIENT
Start: 2023-10-20 | End: 2023-12-08

## 2023-10-20 ASSESSMENT — PAIN SCALES - GENERAL: PAINLEVEL: MODERATE PAIN (5)

## 2023-10-20 NOTE — LETTER
10/20/2023         RE: Frandy Workman  [address] [address] [facility] [address]        Dear Colleague,    Thank you for referring your patient, Frandy Workman, to the [facility]. Please see a copy of my visit note below.    Virtual Visit Details    Type of service:  Video Visit   Video Start Time:  236  Video End Time:3:12 PM    Originating Location (pt. Location): Home    Distant Location (provider location):  On-site  Platform used for Video Visit: Odette    Reason for Visit: seen in follow-up of recurrent hepatocellular carcinoma    Oncology HPI:   Miller Workman is a 58 year old man with a PMH of DMII, bipolar 1 disorder, depressed with LIZZETTE, cirrhosis s/t ESTRADA with subsequent development of HCC, s/p liver transplant , HLD, HTN, GERD, BPH and CAD with hx of 2 vessel CABG in July 2021, CKD with anemia of chronic disease, on erythropoetin.      He as diagnosed with hepatocellular carcinoma in December 2018 when he was found to have 2 LIRADS 5 lesions on surveillance imaging. He underwent TACE on 1/22/19.  He is s/p  liver transplant on 11/11/2019. The explanted liver had 2 lesions that were mostly necrotic and less than 3 cm with no clearly identifiable vascular invasion.       He was found to have peritoneal masses on routine surveillance imaging in June 2023. He underwent laparoscopy with biopsies and pericecal mass resection confirming metastatic HCC.      He met with Dr. Villarreal in July with recommendation to start sorafenib. He started 400 mg BID on 7/29/23.      He was admitted 8/10-8/16 with hyperkalemia, acute kidney injury. He required one round of hemodialysis. He was noted to have thrombocytopenia with a platelet count from 178 t9 97. A HIT VAHID was negative.   Renal function and hyperkalemia improved through the course of hospitalization. He was followed by nephrology. He was drinking 1.6 L of fluid.  Jardiance and lisinopril held. The cause of the JERONIMO was in the context of  severe nausea and vomiting and diarrhea prior to admission.       He resumed sorafenib 200 mg/day on 9/12/23.  He had repeat CT imaging when he saw  on 9/21/23 that was stable. He was recommended to continue sorafenib, the dose was increased to 200 mg bid, then increased further to 200/400 mg bid on 10/6/23.     He presented with leg swelling on 10/11/23 and was found to have a DVT in the left peroneal vein. He was started on eliquis.      Interval history:   Miller is joined on video with his friend, Paty.  He has noted rare diarrhea, usually controlled well with antidiarrheals. Is mildly queasy at times. Appetite is good. Eating/drinking well. Taking in 180-220 ounces of fluid  a day. Urine is light in color. No light headedness.   Has some fatigue, but able to continue to exercise Walked 6200 steps yesterday  L>R swelling, getting better compression stockings. Has noted increased bruising on the face since starting eliquis. No bleeding elsewhere  -following with nephrology for his blood pressure management, recently added lisinopril back /83,  -glucoses 120 recently, following closely with endocrine  URI--mucinex 2weeks ago, hacking at night, afebrile, no dyspnea. Friend who visited had a cold. Unsure of COVID status. Has not yet received a booster    Current Outpatient Medications   Medication Sig Dispense Refill    Alcohol Swabs (ALCOHOL PREP) 70 % PADS Use for glucose testing 4x daily  and insulin administration 4x daily. 400 each 1    ammonium lactate (AMLACTIN) 12 % external cream Apply topically daily as needed for dry skin (on feet) 140 g 5    Apixaban Starter Pack (ELIQUIS DVT/PE STARTER PACK) 5 MG TBPK Take 10 mg by mouth 2 times daily for 7 days, THEN 5 mg 2 times daily for 23 days. 74 each 0    aspirin ( ASPIRIN ADULT LOW STRENGTH) 81 MG EC tablet Take 2 tablets (162 mg) by mouth daily 180 tablet 3    BD VIKTORIA U/F 32G X 4 MM insulin pen needle Use 5 per day 300 each 3    benzoyl  peroxide 5 % external liquid Use topically in showers as a body wash 226 g 11    blood glucose (NO BRAND SPECIFIED) lancets standard Use to test blood sugar 1 times daily or as directed. 100 each 11    Continuous Blood Gluc Sensor (FREESTYLE CLAUDIA 2 SENSOR) [facility] 1 each See Admin Instructions Change every 14 days. 7 each 3    insulin aspart (NOVOLOG PEN) 100 UNIT/ML pen Inject 5-10 Units Subcutaneous 4 times daily (with meals and nightly) 1unit : 8 g carb before meals.  Also add 1 unit : 50 mg/dl >180 before meals and at bedtime. 45 mL 3    insulin degludec (TRESIBA FLEXTOUCH) 100 UNIT/ML pen Inject 15 units subcutaneous once daily 45 mL 3    ketoconazole (NIZORAL) 2 % external shampoo Apply thin layer topically to scalp in shower (leave on 5 min prior to rinse); may also use as a body wash 120 mL 11    lamiVUDine (EPIVIR) 100 MG tablet Take 1 tablet (100 mg) by mouth daily 90 tablet 3    lisinopril (ZESTRIL) 5 MG tablet Take 1 tablet (5 mg) by mouth daily 90 tablet 3    loperamide (IMODIUM A-D) 2 MG tablet Take 1-2 tablets (2-4 mg) by mouth 4 times daily as needed for diarrhea 120 tablet 3    metoprolol succinate ER (TOPROL XL) 25 MG 24 hr tablet Take 1 tablet (25 mg) by mouth daily 45 tablet 1    Multiple Vitamin (TAB-A-GLADIS) TABS TAKE ONE TABLET BY MOUTH ONCE DAILY 90 tablet 0    mycophenolate (GENERIC EQUIVALENT) 250 MG capsule Take 2 capsules (500 mg) by mouth every 12 hours 120 capsule 11    NIFEdipine ER OSMOTIC (PROCARDIA XL) 60 MG 24 hr tablet Take 1 tablet (60 mg) by mouth 2 times daily 60 tablet 11    ondansetron (ZOFRAN ODT) 8 MG ODT tab Take 1 tablet (8 mg) by mouth every 8 hours as needed for nausea 30 tablet 3    pantoprazole (PROTONIX) 40 MG EC tablet Take 1 tablet (40 mg) by mouth daily before breakfast 90 tablet 3    predniSONE (DELTASONE) 5 MG tablet Take 1 tablet (5 mg) by mouth daily 90 tablet 3    rosuvastatin (CRESTOR) 20 MG tablet Take 1 tablet (20 mg) by mouth daily 90 tablet 3    SORAfenib  "(NEXAVAR) 200 MG tablet Take 200 mg in AM and 400 mg in PM on an empty stomach 1 hour before or 2 hours after a meal. Meet with oncology in 2 weeks to discuss ramp up to 400 mg twice daily 120 tablet 0    tamsulosin (FLOMAX) 0.4 MG capsule Take 1 capsule (0.4 mg) by mouth daily 90 capsule 3    Vitamin D3 50 mcg (2000 units) tablet Take 1 tablet (50 mcg) by mouth daily 90 tablet 3          Allergies   Allergen Reactions    Codeine Other (See Comments)     Cannot take due to liver  Cannot tolerate oral narcotics    Seasonal Allergies      Sneezing, coughing, runny and itchy eyes         Video physical exam  General: Patient appears well in no acute distress.   Skin: No visualized rash or lesions on visualized skin  Eyes: EOMI, no erythema, sclera icterus or discharge noted  Resp: Appears to be breathing comfortably without accessory muscle usage, speaking in full sentences, no cough  MSK: Appears to have normal range of motion based on visualized movements  Neurologic: No apparent tremors, facial movements symmetric  Psych: affect engaged, alert and oriented   Height 1.753 m (5' 9\"), weight 77.1 kg (170 lb).  Wt Readings from Last 4 Encounters:   10/20/23 77.1 kg (170 lb)   10/11/23 80.7 kg (178 lb)   10/06/23 78 kg (172 lb)   09/15/23 81.6 kg (179 lb 12.8 oz)         Labs:    Latest Reference Range & Units 10/19/23 11:01   Sodium 135 - 145 mmol/L 141   Potassium 3.4 - 5.3 mmol/L 4.7   Chloride 98 - 107 mmol/L 109 (H)   Carbon Dioxide (CO2) 22 - 29 mmol/L 23   Urea Nitrogen 8.0 - 23.0 mg/dL 25.3 (H)   Creatinine 0.67 - 1.17 mg/dL 1.28 (H)   GFR Estimate >60 mL/min/1.73m2 64   Calcium 8.6 - 10.0 mg/dL 8.7   Anion Gap 7 - 15 mmol/L 9   Magnesium 1.7 - 2.3 mg/dL 1.8   Phosphorus 2.5 - 4.5 mg/dL 1.9 (L)   Albumin 3.5 - 5.2 g/dL 4.1   Protein Total 6.4 - 8.3 g/dL 6.7   Alkaline Phosphatase 40 - 129 U/L 102   ALT 0 - 70 U/L 23   AST 0 - 45 U/L 28   Bilirubin Total <=1.2 mg/dL 0.4   Glucose 70 - 99 mg/dL 105 (H)   WBC 4.0 - " 11.0 10e3/uL 6.3   Hemoglobin 13.3 - 17.7 g/dL 10.8 (L)   Hematocrit 40.0 - 53.0 % 33.4 (L)   Platelet Count 150 - 450 10e3/uL 125 (L)   RBC Count 4.40 - 5.90 10e6/uL 3.39 (L)   MCV 78 - 100 fL 99   MCH 26.5 - 33.0 pg 31.9   MCHC 31.5 - 36.5 g/dL 32.3   RDW 10.0 - 15.0 % 15.4 (H)   % Neutrophils % 83   % Lymphocytes % 11   % Monocytes % 4   % Eosinophils % 1   % Basophils % 0   Absolute Basophils 0.0 - 0.2 10e3/uL 0.0   Absolute Eosinophils 0.0 - 0.7 10e3/uL 0.0   Absolute Immature Granulocytes <=0.4 10e3/uL 0.0   Absolute Lymphocytes 0.8 - 5.3 10e3/uL 0.7 (L)   Absolute Monocytes 0.0 - 1.3 10e3/uL 0.3   % Immature Granulocytes % 1   Absolute Neutrophils 1.6 - 8.3 10e3/uL 5.3   Absolute NRBCs 10e3/uL 0.0   NRBCs per 100 WBC <1 /100 0   (H): Data is abnormally high  (L): Data is abnormally low        Impression/plan:   HCC  -started sorafenib 400 mg BID on 7/29.  Tolerated the first several days well, except for some diarrhea. Then developed acute N/V 4 days prior to admission for renal failure and hyperkalemia. T  -he had an extended break off of therapy as he was traveling over the labor day weekend. He resumed 200 mg daily on 9/8.   -dose was increased to 200 mg bid on 9/21/23 and on 10/6/23 to 200 mg in am and 400 mg in pm  -he is tolerating the current doser reasonably well  -has mild, intermittent diarrhea, well controlled with imodium, nausea is mild, eating/drinking well. No hand/foot or mucosal toxicities  -I reviewed the labs which are notable for new hypophosphatemia, a common SE of sorafenib  -he is ok to increase sorafenib to 400 mg bid. CrCl is stable for this dosing. I will see him back on 10/30 with labs to monitor toxicity  -repeat CT imaging planned at the end of November    L peroneal DVT, dx 10/11/23  -on Eliquis, having increased bruising, now on maintenance dosing. Advised to stop bid ASA for now and continue eliquis at the maintenance dose--refilled today     Thrombocytopenia, likely s/t  sorafenib, no evidence of HIT on VAHID.   -platelets again trending down, but stable to continue sorafenib and anticoagulation. Will monitor     Hx of liver transplant  -immunosuppression with MMF, prednisone as per Dr. Banuelos     CAD s/p 2 vessel CABG 2021  HTN  CKD  -following with  and Dr. Bhatt  -on metoprolol, recently started on nifedipine 60 mg bid  -BP is okay here today, but has had some high's at home  -stopping asa as he is on bid eliquis and has increased bleeding risk    Hypophos-s/t sorafenib-  -replace with Kphos-QID  -recheck phos on 10/30    URI--symptoms presented 2 weeks ago, slow to resolve  -does not appear acutely ill today  -recommend RSV and COVID testing  -should proceed with COVID vaccination when he is recovered from his current URI      40 minutes spent on the date of the encounter doing chart review, review of test results, interpretation of tests, patient visit, documentation, discussion with other provider(s), and discussion with his friend.            SHANIQUE Liz CNP

## 2023-10-20 NOTE — PROGRESS NOTES
Virtual Visit Details    Type of service:  Video Visit   Video Start Time:  236  Video End Time:3:12 PM    Originating Location (pt. Location): Home    Distant Location (provider location):  On-site  Platform used for Video Visit: Odette    Reason for Visit: seen in follow-up of recurrent hepatocellular carcinoma    Oncology HPI:   Miller Workman is a 58 year old man with a PMH of DMII, bipolar 1 disorder, depressed with LIZZETTE, cirrhosis s/t ESTRADA with subsequent development of HCC, s/p liver transplant , HLD, HTN, GERD, BPH and CAD with hx of 2 vessel CABG in July 2021, CKD with anemia of chronic disease, on erythropoetin.      He as diagnosed with hepatocellular carcinoma in December 2018 when he was found to have 2 LIRADS 5 lesions on surveillance imaging. He underwent TACE on 1/22/19.  He is s/p  liver transplant on 11/11/2019. The explanted liver had 2 lesions that were mostly necrotic and less than 3 cm with no clearly identifiable vascular invasion.       He was found to have peritoneal masses on routine surveillance imaging in June 2023. He underwent laparoscopy with biopsies and pericecal mass resection confirming metastatic HCC.      He met with Dr. Villarreal in July with recommendation to start sorafenib. He started 400 mg BID on 7/29/23.      He was admitted 8/10-8/16 with hyperkalemia, acute kidney injury. He required one round of hemodialysis. He was noted to have thrombocytopenia with a platelet count from 178 t9 97. A HIT VAHID was negative.   Renal function and hyperkalemia improved through the course of hospitalization. He was followed by nephrology. He was drinking 1.6 L of fluid.  Jardiance and lisinopril held. The cause of the JERONIMO was in the context of severe nausea and vomiting and diarrhea prior to admission.       He resumed sorafenib 200 mg/day on 9/12/23.  He had repeat CT imaging when he saw  on 9/21/23 that was stable. He was recommended to continue sorafenib, the dose was increased to  200 mg bid, then increased further to 200/400 mg bid on 10/6/23.     He presented with leg swelling on 10/11/23 and was found to have a DVT in the left peroneal vein. He was started on eliquis.      Interval history:   Miller is joined on video with his friend, Paty.  He has noted rare diarrhea, usually controlled well with antidiarrheals. Is mildly queasy at times. Appetite is good. Eating/drinking well. Taking in 180-220 ounces of fluid  a day. Urine is light in color. No light headedness.   Has some fatigue, but able to continue to exercise Walked 6200 steps yesterday  L>R swelling, getting better compression stockings. Has noted increased bruising on the face since starting eliquis. No bleeding elsewhere  -following with nephrology for his blood pressure management, recently added lisinopril back /83,  -glucoses 120 recently, following closely with endocrine  URI--mucinex 2weeks ago, hacking at night, afebrile, no dyspnea. Friend who visited had a cold. Unsure of COVID status. Has not yet received a booster    Current Outpatient Medications   Medication Sig Dispense Refill    Alcohol Swabs (ALCOHOL PREP) 70 % PADS Use for glucose testing 4x daily  and insulin administration 4x daily. 400 each 1    ammonium lactate (AMLACTIN) 12 % external cream Apply topically daily as needed for dry skin (on feet) 140 g 5    Apixaban Starter Pack (ELIQUIS DVT/PE STARTER PACK) 5 MG TBPK Take 10 mg by mouth 2 times daily for 7 days, THEN 5 mg 2 times daily for 23 days. 74 each 0    aspirin ( ASPIRIN ADULT LOW STRENGTH) 81 MG EC tablet Take 2 tablets (162 mg) by mouth daily 180 tablet 3    BD VIKTORIA U/F 32G X 4 MM insulin pen needle Use 5 per day 300 each 3    benzoyl peroxide 5 % external liquid Use topically in showers as a body wash 226 g 11    blood glucose (NO BRAND SPECIFIED) lancets standard Use to test blood sugar 1 times daily or as directed. 100 each 11    Continuous Blood Gluc Sensor (FREESTYLE CLAUDIA 2 SENSOR)  MISC 1 each See Admin Instructions Change every 14 days. 7 each 3    insulin aspart (NOVOLOG PEN) 100 UNIT/ML pen Inject 5-10 Units Subcutaneous 4 times daily (with meals and nightly) 1unit : 8 g carb before meals.  Also add 1 unit : 50 mg/dl >180 before meals and at bedtime. 45 mL 3    insulin degludec (TRESIBA FLEXTOUCH) 100 UNIT/ML pen Inject 15 units subcutaneous once daily 45 mL 3    ketoconazole (NIZORAL) 2 % external shampoo Apply thin layer topically to scalp in shower (leave on 5 min prior to rinse); may also use as a body wash 120 mL 11    lamiVUDine (EPIVIR) 100 MG tablet Take 1 tablet (100 mg) by mouth daily 90 tablet 3    lisinopril (ZESTRIL) 5 MG tablet Take 1 tablet (5 mg) by mouth daily 90 tablet 3    loperamide (IMODIUM A-D) 2 MG tablet Take 1-2 tablets (2-4 mg) by mouth 4 times daily as needed for diarrhea 120 tablet 3    metoprolol succinate ER (TOPROL XL) 25 MG 24 hr tablet Take 1 tablet (25 mg) by mouth daily 45 tablet 1    Multiple Vitamin (TAB-A-GLADIS) TABS TAKE ONE TABLET BY MOUTH ONCE DAILY 90 tablet 0    mycophenolate (GENERIC EQUIVALENT) 250 MG capsule Take 2 capsules (500 mg) by mouth every 12 hours 120 capsule 11    NIFEdipine ER OSMOTIC (PROCARDIA XL) 60 MG 24 hr tablet Take 1 tablet (60 mg) by mouth 2 times daily 60 tablet 11    ondansetron (ZOFRAN ODT) 8 MG ODT tab Take 1 tablet (8 mg) by mouth every 8 hours as needed for nausea 30 tablet 3    pantoprazole (PROTONIX) 40 MG EC tablet Take 1 tablet (40 mg) by mouth daily before breakfast 90 tablet 3    predniSONE (DELTASONE) 5 MG tablet Take 1 tablet (5 mg) by mouth daily 90 tablet 3    rosuvastatin (CRESTOR) 20 MG tablet Take 1 tablet (20 mg) by mouth daily 90 tablet 3    SORAfenib (NEXAVAR) 200 MG tablet Take 200 mg in AM and 400 mg in PM on an empty stomach 1 hour before or 2 hours after a meal. Meet with oncology in 2 weeks to discuss ramp up to 400 mg twice daily 120 tablet 0    tamsulosin (FLOMAX) 0.4 MG capsule Take 1 capsule (0.4  "mg) by mouth daily 90 capsule 3    Vitamin D3 50 mcg (2000 units) tablet Take 1 tablet (50 mcg) by mouth daily 90 tablet 3          Allergies   Allergen Reactions    Codeine Other (See Comments)     Cannot take due to liver  Cannot tolerate oral narcotics    Seasonal Allergies      Sneezing, coughing, runny and itchy eyes         Video physical exam  General: Patient appears well in no acute distress.   Skin: No visualized rash or lesions on visualized skin  Eyes: EOMI, no erythema, sclera icterus or discharge noted  Resp: Appears to be breathing comfortably without accessory muscle usage, speaking in full sentences, no cough  MSK: Appears to have normal range of motion based on visualized movements  Neurologic: No apparent tremors, facial movements symmetric  Psych: affect engaged, alert and oriented   Height 1.753 m (5' 9\"), weight 77.1 kg (170 lb).  Wt Readings from Last 4 Encounters:   10/20/23 77.1 kg (170 lb)   10/11/23 80.7 kg (178 lb)   10/06/23 78 kg (172 lb)   09/15/23 81.6 kg (179 lb 12.8 oz)         Labs:    Latest Reference Range & Units 10/19/23 11:01   Sodium 135 - 145 mmol/L 141   Potassium 3.4 - 5.3 mmol/L 4.7   Chloride 98 - 107 mmol/L 109 (H)   Carbon Dioxide (CO2) 22 - 29 mmol/L 23   Urea Nitrogen 8.0 - 23.0 mg/dL 25.3 (H)   Creatinine 0.67 - 1.17 mg/dL 1.28 (H)   GFR Estimate >60 mL/min/1.73m2 64   Calcium 8.6 - 10.0 mg/dL 8.7   Anion Gap 7 - 15 mmol/L 9   Magnesium 1.7 - 2.3 mg/dL 1.8   Phosphorus 2.5 - 4.5 mg/dL 1.9 (L)   Albumin 3.5 - 5.2 g/dL 4.1   Protein Total 6.4 - 8.3 g/dL 6.7   Alkaline Phosphatase 40 - 129 U/L 102   ALT 0 - 70 U/L 23   AST 0 - 45 U/L 28   Bilirubin Total <=1.2 mg/dL 0.4   Glucose 70 - 99 mg/dL 105 (H)   WBC 4.0 - 11.0 10e3/uL 6.3   Hemoglobin 13.3 - 17.7 g/dL 10.8 (L)   Hematocrit 40.0 - 53.0 % 33.4 (L)   Platelet Count 150 - 450 10e3/uL 125 (L)   RBC Count 4.40 - 5.90 10e6/uL 3.39 (L)   MCV 78 - 100 fL 99   MCH 26.5 - 33.0 pg 31.9   MCHC 31.5 - 36.5 g/dL 32.3   RDW " 10.0 - 15.0 % 15.4 (H)   % Neutrophils % 83   % Lymphocytes % 11   % Monocytes % 4   % Eosinophils % 1   % Basophils % 0   Absolute Basophils 0.0 - 0.2 10e3/uL 0.0   Absolute Eosinophils 0.0 - 0.7 10e3/uL 0.0   Absolute Immature Granulocytes <=0.4 10e3/uL 0.0   Absolute Lymphocytes 0.8 - 5.3 10e3/uL 0.7 (L)   Absolute Monocytes 0.0 - 1.3 10e3/uL 0.3   % Immature Granulocytes % 1   Absolute Neutrophils 1.6 - 8.3 10e3/uL 5.3   Absolute NRBCs 10e3/uL 0.0   NRBCs per 100 WBC <1 /100 0   (H): Data is abnormally high  (L): Data is abnormally low        Impression/plan:   HCC  -started sorafenib 400 mg BID on 7/29.  Tolerated the first several days well, except for some diarrhea. Then developed acute N/V 4 days prior to admission for renal failure and hyperkalemia. T  -he had an extended break off of therapy as he was traveling over the labor day weekend. He resumed 200 mg daily on 9/8.   -dose was increased to 200 mg bid on 9/21/23 and on 10/6/23 to 200 mg in am and 400 mg in pm  -he is tolerating the current doser reasonably well  -has mild, intermittent diarrhea, well controlled with imodium, nausea is mild, eating/drinking well. No hand/foot or mucosal toxicities  -I reviewed the labs which are notable for new hypophosphatemia, a common SE of sorafenib  -he is ok to increase sorafenib to 400 mg bid. CrCl is stable for this dosing. I will see him back on 10/30 with labs to monitor toxicity  -repeat CT imaging planned at the end of November    L peroneal DVT, dx 10/11/23  -on Eliquis, having increased bruising, now on maintenance dosing. Advised to stop bid ASA for now and continue eliquis at the maintenance dose--refilled today     Thrombocytopenia, likely s/t sorafenib, no evidence of HIT on VAHID.   -platelets again trending down, but stable to continue sorafenib and anticoagulation. Will monitor     Hx of liver transplant  -immunosuppression with MMF, prednisone as per Dr. Banuelos     CAD s/p 2 vessel CABG  2021  HTN  CKD  -following with  and Dr. Bhatt  -on metoprolol, recently started on nifedipine 60 mg bid  -BP is okay here today, but has had some high's at home  -stopping asa as he is on bid eliquis and has increased bleeding risk    Hypophos-s/t sorafenib-  -replace with Kphos-QID  -recheck phos on 10/30    URI--symptoms presented 2 weeks ago, slow to resolve  -does not appear acutely ill today  -recommend RSV and COVID testing  -should proceed with COVID vaccination when he is recovered from his current URI      40 minutes spent on the date of the encounter doing chart review, review of test results, interpretation of tests, patient visit, documentation, discussion with other provider(s), and discussion with his friend.

## 2023-10-20 NOTE — NURSING NOTE
Is the patient currently in the state of [place]? YES    Visit mode:VIDEO    If the visit is dropped, the patient can be reconnected by: VIDEO VISIT: Text to cell phone:   Telephone Information:   Mobile [phone]       Will anyone else be joining the visit? YES: How would they like to receive their invitation? Text to cell phone: [phone] tushar Newman friend      How would you like to obtain your AVS? MyChart    Are changes needed to the allergy or medication list? Pt stated no changes to allergies and Pt stated no med changes    Reason for visit: KHUSHBU Baird LPN

## 2023-10-20 NOTE — PROGRESS NOTES
[facility]: Integrated Behavioral Health  October 19, 2023        Behavioral Health Clinician Progress Note    Patient Name: Frandy Workman           Service Type: Video visit      Service Location:  Video visit      Session Start Time: 3:33  Session End Time: 4:25      Session Length: 38 - 52      Attendees: Patient    Visit Activities (Refresh list every visit): [facility] Only     Service Modality:  Video Visit:      Provider verified identity through the following two step process.  Patient provided:  Patient photo and Patient is known previously to provider    Telemedicine Visit: The patient's condition can be safely assessed and treated via synchronous audio and visual telemedicine encounter.      Reason for Telemedicine Visit: Services only offered telehealth    Originating Site (Patient Location): Patient's home    Distant Site (Provider Location): Provider Remote Setting- Home Office    Consent:  The patient/guardian has verbally consented to: the potential risks and benefits of telemedicine (video visit) versus in person care; bill my insurance or make self-payment for services provided; and responsibility for payment of non-covered services.     Patient would like the video invitation sent by:  My Chart    Mode of Communication:  Video Conference via Amwell    Distant Location (Provider):  Off-site    As the provider I attest to compliance with applicable laws and regulations related to telemedicine.      Diagnostic Assessment Date:  12/03/2020  Treatment Plan Review Date:  11/4/2023  See Flowsheets for today's PHQ-9 and LIZZETTE-7 results  Previous PHQ-9:       6/28/2023     4:07 PM 8/3/2023     9:13 AM 9/20/2023     2:45 PM   PHQ-9 SCORE   PHQ-9 Total Score MyChart 3 (Minimal depression) 3 (Minimal depression) 5 (Mild depression)   PHQ-9 Total Score 3 3 5     Previous LIZZETTE-7:       4/7/2021    12:34 PM 9/30/2021     1:45 PM 5/17/2023     3:37 PM   LIZZETTE-7 SCORE   Total Score 9 (mild  anxiety)     Total Score 9 10 6      11/7/2017  5:23 AM 3/22/2022  1:49 PM 3/21/2023  1:43 PM 6/22/2023  9:09 AM   PROMIS-10 Scores Only       Global Mental Health Score 14  9  9  13    Global Physical Health Score 13  10  9  13    PROMIS TOTAL - SUBSCORES 27  19  18  26       9/14/2023  9:45 AM   PROMIS-10 Scores Only    Global Mental Health Score 15    Global Physical Health Score 12    PROMIS TOTAL - SUBSCORES 27            Extended Session (60+ minutes): No  Interactive Complexity: No  Crisis: Yes, visit entailed Crisis Management / Stabilization requiring urgent assessment and history of the crisis state, mental status exam and disposition    Treatment Objective(s) Addressed in This Session:  Target Behavior(s): disease management/lifestyle changes   related to adaptive approaches to managing anxious distress and mood difficulties    Depressed mood: Increase interest, pleasure in doing things. Anger management strategies.  Improve sleep hygiene       Current Stressors / Issues:  [facility] met with Miller today for a follow-up, to help Miller continue to feel supported in his health behavior change and overall mental health.      Miller states he is feeling better today emotionally. He is describing a recent event during which he became involved with a fraudulent individual online for a business transaction he was making. Fortunately Miller notes his bank caught the transaction and he did not lose any money. We reflected on how this has happened before him, what safeguards Miller is putting in place to protect himself financially. Supported the idea for someone to look over any transactions he makes, normalizing how sophisticated scams can be.       Progress on Treatment Objective(s) / Homework:  Stable - ACTION (Actively working towards change); Intervened by reinforcing change plan / affirming steps taken      Solution-Focused Therapy  Explore patterns in patient's relationships and discuss options for new behaviors.  Explore  patterns in patient's actions and choices and discuss options for new behaviors.    Behavioral Activation  Discuss steps patient can take to become more involved in meaningful activity.  Identify barriers to these activities and explore possible solutions.      Answers for HPI/ROS submitted by the patient on 3/21/2022  If you checked off any problems, how difficult have these problems made it for you to do your work, take care of things at home, or get along with other people?: Not difficult at all  PHQ9 TOTAL SCORE: 6      Answers for HPI/ROS submitted by the patient on 2/8/2022  If you checked off any problems, how difficult have these problems made it for you to do your work, take care of things at home, or get along with other people?: Somewhat difficult  PHQ9 TOTAL SCORE: 4      Answers for HPI/ROS submitted by the patient on 4/7/2021   LIZZETTE 7 TOTAL SCORE: 9  If you checked off any problems, how difficult have these problems made it for you to do your work, take care of things at home, or get along with other people?: Very difficult  PHQ9 TOTAL SCORE: 7*    *No SI    Answers for HPI/ROS submitted by the patient on 10/13/2020   If you checked off any problems, how difficult have these problems made it for you to do your work, take care of things at home, or get along with other people?: Somewhat difficult  PHQ9 TOTAL SCORE: 8*  LIZZETTE 7 TOTAL SCORE: 6      *No SI     Answers for HPI/ROS submitted by the patient on 12/29/2020   If you checked off any problems, how difficult have these problems made it for you to do your work, take care of things at home, or get along with other people?: Somewhat difficult  PHQ9 TOTAL SCORE: 5*  LIZZETTE 7 TOTAL SCORE: 8    *No SI     Answers for HPI/ROS submitted by the patient on 11/21/2022  If you checked off any problems, how difficult have these problems made it for you to do your work, take care of things at home, or get along with other people?: Very difficult  PHQ9 TOTAL SCORE:  4          Care Plan review completed: no    Medication Review:  No changes to current psychiatric medication(s)     Reports discontinuing zolpidem.       Current Outpatient Medications   Medication    Alcohol Swabs (ALCOHOL PREP) 70 % PADS    ammonium lactate (AMLACTIN) 12 % external cream    Apixaban Starter Pack (ELIQUIS DVT/PE STARTER PACK) 5 MG TBPK    aspirin (SM ASPIRIN ADULT LOW STRENGTH) 81 MG EC tablet    BD VIKTORIA U/F 32G X 4 MM insulin pen needle    benzoyl peroxide 5 % external liquid    blood glucose (NO BRAND SPECIFIED) lancets standard    Continuous Blood Gluc Sensor (FREESTYLE CLAUDIA 2 SENSOR) MISC    insulin aspart (NOVOLOG PEN) 100 UNIT/ML pen    insulin degludec (TRESIBA FLEXTOUCH) 100 UNIT/ML pen    ketoconazole (NIZORAL) 2 % external shampoo    lamiVUDine (EPIVIR) 100 MG tablet    lisinopril (ZESTRIL) 5 MG tablet    loperamide (IMODIUM A-D) 2 MG tablet    metoprolol succinate ER (TOPROL XL) 25 MG 24 hr tablet    Multiple Vitamin (TAB-A-GLADIS) TABS    mycophenolate (GENERIC EQUIVALENT) 250 MG capsule    NIFEdipine ER OSMOTIC (PROCARDIA XL) 60 MG 24 hr tablet    ondansetron (ZOFRAN ODT) 8 MG ODT tab    pantoprazole (PROTONIX) 40 MG EC tablet    predniSONE (DELTASONE) 5 MG tablet    rosuvastatin (CRESTOR) 20 MG tablet    SORAfenib (NEXAVAR) 200 MG tablet    tamsulosin (FLOMAX) 0.4 MG capsule    Vitamin D3 50 mcg (2000 units) tablet     Current Facility-Administered Medications   Medication    aflibercept (EYLEA) injection prefilled syringe 2 mg    aflibercept (EYLEA) injection prefilled syringe 2 mg    dexamethasone (OZURDEX) intravitreal implant 0.7 mg    dexamethasone (OZURDEX) intravitreal implant 0.7 mg    faricimab-svoa (VABYSMO) intravitreal injection 6 mg    faricimab-svoa (VABYSMO) intravitreal injection 6 mg    lidocaine (PF) (XYLOCAINE) injection 1 mL       Medication Compliance:  Yes    Changes in Health Issues:   None reported    Chemical Use Review:   Substance Use: Chemical use reviewed,  no active concerns identified      Tobacco Use: No current tobacco use.         Assessment: Current Emotional / Mental Status (status of significant symptoms):  Risk status (Self / Other harm or suicidal ideation)  Patient denies a history of suicidal ideation, suicide attempts, self-injurious behavior, homicidal ideation, homicidal behavior and and other safety concerns     Patient denies current fears or concerns for personal safety.  Patient denies current or recent suicidal ideation or behaviors.  Patient denies current or recent homicidal ideation or behaviors.  Patient denies current or recent self injurious behavior or ideation.  Patient denies other safety concerns.     A safety and risk management plan has not been developed at this time, however patient was encouraged to call [place] [phone] should there be a change in any of these risk factors.    Graff Suicide Severity Rating Scale (Short Version)      7/1/2020    11:00 AM 7/12/2021     2:30 PM 1/10/2022     9:28 PM 1/3/2023     6:31 AM 1/24/2023     6:22 AM 7/13/2023    10:31 AM 8/10/2023     3:31 PM   Graff Suicide Severity Rating (Short Version)   Over the past 2 weeks have you felt down, depressed, or hopeless? no no no no no no no   Over the past 2 weeks have you had thoughts of killing yourself? no no no no  no no   Have you ever attempted to kill yourself? no no no no  no no   Q1 Wished to be Dead (Past Month)    no      Q2 Suicidal Thoughts (Past Month)    no      Q3 Suicidal Thought Method    no      Q4 Suicidal Intent without Specific Plan    no      Q5 Suicide Intent with Specific Plan    no      Q6 Suicide Behavior (Lifetime)    no      Level of Risk per Screen    low risk            Appearance:   Appropriate   Eye Contact:   Good   Psychomotor Behavior: TD evident   Attitude:   Cooperative  Interested Friendly Pleasant  Orientation:   All  Speech   Rate / Production: Normal/ Responsive   Volume:  Normal   Mood:    Depressed ;  improving  Affect:    Appropriate    Thought Content:  Clear   Thought Form:  Goal Directed  Logical   Insight:    Good     Diagnoses:  1. Bipolar 1 disorder, depressed, mild (H)    2. Generalized anxiety disorder              Collateral Reports Completed:  Not Applicable    Plan: (Homework, other):  Continue with this writer for individual psychotherapy in 2/3 wks. He will continue to work on managing depression and anxiety through achievable behavioral activation ideas. Information about sleep hygiene provided. Handout given in AVS. .  No CD issues.     Joseph Murillo Psy.D, LP   Behavioral Health Clinician   [facility]              ______________________________________________________________________                                            Individual Treatment Plan    Patient's Name: Frandy Workman  YOB: 1965    Date of Creation: 3/1/2022  Date Treatment Plan Last Reviewed/Revised: 8/4/2023    DSM5 Diagnoses: 296.51 Bipolar I Disorder Current or Most Recent Episode Depressed, Mild or 300.02 (F41.1) Generalized Anxiety Disorder  Psychosocial / Contextual Factors: Post Solid Organ Tx  PROMIS (reviewed every 90 days): 4     11/7/2017  5:23 AM 3/22/2022  1:49 PM 3/21/2023  1:43 PM 6/22/2023  9:09 AM   PROMIS-10 Scores Only       Global Mental Health Score 14  9  9  13    Global Physical Health Score 13  10  9  13    PROMIS TOTAL - SUBSCORES 27  19  18  26       9/14/2023  9:45 AM   PROMIS-10 Scores Only    Global Mental Health Score 15    Global Physical Health Score 12    PROMIS TOTAL - SUBSCORES 27          Referral / Collaboration:  Referral to another professional/service is not indicated at this time..    Anticipated number of session for this episode of care: 4-6  Anticipation frequency of session: Every other week  Anticipated Duration of each session: 38-52 minutes  Treatment plan will be reviewed in 90 days or when goals have been changed.  "      MeasurableTreatment Goal(s) related to diagnosis / functional impairment(s)  Goal 1: Patient will experience a reduction in depressive symptoms along with a corresponding increase in positive emotion and life satisfaction.      Objective #A (Patient Action)    Patient will Increase interest, engagement, and pleasure in doing things.  Status: Continued - Date(s): 8/4/2023    Intervention(s)  Therapist will help patient identify pleasant and mastery oriented events that elicit positive, relaxed mood.    Objective #B  Patient will Decrease frequency and intensity of feeling down, depressed, hopeless.  Status: Continued - Date(s): 8/4/2023    Intervention(s)  Therapist will introduce patient to cognitive-behavioral and acceptance and commitment therapy topics aimed to help reduce depression and anxiety    Objective #C  Patient will Identify negative self-talk and behaviors: challenge core beliefs, myths, and actions  Improve concentration, focus, and mindfulness in daily activities .  Status: Continued - Date(s): COMPLETE    Intervention(s)  Therapist will help patient identify and manage negative self-talk and automatic thoughts; introduce patient to cognitive distortions; help patient develop cognitive diffusion techniques      Goal 2: Patient will experience a reduction in anxious symptoms, along with a corresponding increase in relaxed emotional states and life satisfaction.      Objective #A (Patient Action)  Patient will use cognitive-behavioral and thought diffusion strategies identified in therapy to challenge anxious thoughts.    Status: Continued - Date(s): COMPLETE    Intervention(s)  Therapist will utilize CBT and ACT ideas to help patient challenge anxious thoughts and reduce intensity/duration of anxious distress    Objective #B  Patient will use \"worry time\" each day for 15 minutes of scheduled worry and then defer obsessive or anxious thinking until the next structured worry time.    Status: " Continued - Date(s): COMPLETE    Intervention(s)  Therapist will teach patient how to effectively utilize worry time and/or thought logs/journals each day and incorporate more relaxation behaviors into their routine.    Objective #C  Patient will identify the stressors which contribute to feelings of anxiety  Patient will increase engagement in adaptive coping skills and recreational activities, such as exercise and healthy socialization, to manage distress.  Status: Continued - Date(s): COMPLETE    Intervention(s)  Therapist will help patient identify triggers/situational factors that contribute to anxiety and behavioral skills aimed to manage anxious distress.      Goal 3: Patiient will work toward adaptively managing bipolar-related depression and manic episodes      Objective #A (Patient Action)    Status: Continued - Date(s): COMPLETE    Patient will identify 2-3 signs or signals of emerging mood instability.    Intervention(s)  Therapist will provide educational materials on bipolar disorder and help identifying triggers for mood instability .    Objective #B  Patient will identify 2-3 strategies for more effectively managing Bipolar Disorder.    Status: Continued - Date(s): COMPLETE    Intervention(s)  Therapist will teach emotional recognition/identification. Skills aimed to effectively manage bipolar disorder .      Other Possible Therapeutic Intervention(s):    Psycho-education regarding mental health diagnoses and treatment options    Supportive Therapy  Provide affirmations, reflections, and establish working rapport  Emphasize and reflect on strength of therapeutic relationship    Skills training  Explore skills useful to client in current situation.  Skills include assertiveness, communication, conflict management, problem-solving, relaxation, etc.    Solution-Focused Therapy  Explore patterns in patient's relationships and discuss options for new behaviors.  Explore patterns in patient's actions and  choices and discuss options for new behaviors.    Cognitive-behavioral Therapy  Discuss common cognitive distortions, identify them in patient's life.  Explore ways to challenge, replace, and act against these cognitions.    Acceptance and Commitment Therapy  Explore and identify important values in patient's life.  Discuss ways to commit to behavioral activation around these values.    Psychodynamic psychotherapy  Discuss patient's emotional dynamics and issues and how they impact behaviors.  Explore patient's history of relationships and how they impact present behaviors.  Explore how to work with and make changes in these schemas and patterns.    Narrative Therapy  Explore the patient's story of his/her life from his/her perspective.  Explore alternate ways of understanding their experience, identifying exceptions, developing new themes.    Interpersonal Psychotherapy  Explore patterns in relationships that are effective or ineffective at helping patient reach their goals, find satisfying experience.  Discuss new patterns or behaviors to engage in for improved social functioning.    Behavioral Activation  Discuss steps patient can take to become more involved in meaningful activity.  Identify barriers to these activities and explore possible solutions.    Mindfulness-Based Strategies  Discuss skills based on development and application of mindfulness.  Skills drawn from compassion-focused therapy, dialectical behavior therapy, mindfulness-based stress reduction, mindfulness-based cognitive therapy, etc.      Patient has reviewed and agreed to the above plan.      Joseph Murillo Psy.D, LP   Behavioral Health Clinician   [facility]     8/4/2023  Answers for HPI/ROS submitted by the patient on 2/28/2023  If you checked off any problems, how difficult have these problems made it for you to do your work, take care of things at home, or get along with other people?: Very difficult  PHQ9  TOTAL SCORE: 6    Answers for HPI/ROS submitted by the patient on 6/28/2023  If you checked off any problems, how difficult have these problems made it for you to do your work, take care of things at home, or get along with other people?: Somewhat difficult  PHQ9 TOTAL SCORE: 3Answers submitted by the patient for this visit:  Patient Health Questionnaire (Submitted on 9/20/2023)  If you checked off any problems, how difficult have these problems made it for you to do your work, take care of things at home, or get along with other people?: Somewhat difficult  PHQ9 TOTAL SCORE: 5

## 2023-10-21 LAB — SARS-COV-2 RNA RESP QL NAA+PROBE: NEGATIVE

## 2023-10-23 ENCOUNTER — MYC MEDICAL ADVICE (OUTPATIENT)
Dept: ONCOLOGY | Facility: CLINIC | Age: 58
End: 2023-10-23
Payer: MEDICARE

## 2023-10-23 DIAGNOSIS — R11.2 NAUSEA AND VOMITING, UNSPECIFIED VOMITING TYPE: ICD-10-CM

## 2023-10-24 ENCOUNTER — VIRTUAL VISIT (OUTPATIENT)
Dept: INTERNAL MEDICINE | Facility: CLINIC | Age: 58
End: 2023-10-24
Payer: MEDICARE

## 2023-10-24 DIAGNOSIS — J20.5 ACUTE BRONCHITIS DUE TO RESPIRATORY SYNCYTIAL VIRUS (RSV): Primary | ICD-10-CM

## 2023-10-24 PROCEDURE — 99214 OFFICE O/P EST MOD 30 MIN: CPT | Mod: 95 | Performed by: NURSE PRACTITIONER

## 2023-10-24 RX ORDER — ONDANSETRON 8 MG/1
8 TABLET, ORALLY DISINTEGRATING ORAL EVERY 8 HOURS PRN
Qty: 30 TABLET | Refills: 3 | Status: SHIPPED | OUTPATIENT
Start: 2023-10-24 | End: 2024-01-31

## 2023-10-24 RX ORDER — ALBUTEROL SULFATE 90 UG/1
2 AEROSOL, METERED RESPIRATORY (INHALATION) EVERY 4 HOURS PRN
Qty: 18 G | Refills: 1 | Status: SHIPPED | OUTPATIENT
Start: 2023-10-24 | End: 2023-10-31

## 2023-10-24 RX ORDER — ALBUTEROL SULFATE 90 UG/1
2 AEROSOL, METERED RESPIRATORY (INHALATION) EVERY 4 HOURS PRN
Status: DISCONTINUED | OUTPATIENT
Start: 2023-10-24 | End: 2023-10-24

## 2023-10-24 NOTE — PATIENT INSTRUCTIONS
Thank you for visiting the Primary Care Center today at the [facility]! The following is some information about our clinic:     Primary Care Center Frequently-Asked Questions    (1) How do I schedule appointments at the [facility]?     Primary Care--to schedule or make changes to an existing appointment, please call our primary care line at [phone].    Labs--to schedule a lab appointment at the [facility] you can use Phoneplus or call [phone]. If you have a [place] location that is closer to home, you can reach out to that location for scheduling options.     Imaging--if you need to schedule a CT, X-ray, MRI, ultrasound, or other imaging study you can call [phone] to schedule at the [facility] or any other [facility] imaging location.     Referrals--if a referral to another specialty was ordered you can expect a phone call from their scheduling team. If you have not heard from them in a week, please call us or send us a Phoneplus message to check the status or get a scheduling number. Please note that this only applies to internal [facility] referrals. If the referral is external you would need to contact their office for scheduling.     (2) I have a question about my visit, who do I contact?     You can call us at the primary care line at [phone] to ask questions about your visit. You can also send a secure message through Phoneplus, which is reviewed by clinic staff. Please note that Phoneplus messages have a twenty-four to forty-eight business hour turnaround time and should not be used for urgent concerns.    (3) How will I get the results of my tests?    If you are signed up for [facility] all tests will be released to you within twenty-four hours of resulting. Please allow three to five days for your doctor to review your results and place a note interpreting the results. If you do not have [facility] you will receive your  results through mail seven to ten business days following the return of the tests. Please note that if there should be any urgent or concerning results that your doctor or their registered nurse will reach out to you the same day as the tests come back. If you have follow up questions about your results or would like to discuss the results in detail please schedule a follow up with your provider either in person or virtually.     (4) How do I get refills of my prescriptions?     You should always first contact your pharmacy for refills of your medications. If submitting a refill request on [facility], please be sure to submit the request only once--repeat requests can cause delays in refill. If you are requesting a NEW medication or a medication related to new symptoms you will need to schedule an appointment with a provider prior to approval. Please note: Routine medication refills have up to one to three business day turnaround whereas controlled substances refills have up to five to seven business day turnaround.    (5) I have new symptoms, what do I do?     If you are having an immediate medical emergency, you should dial 911 for assistance.   For anything urgent that needs to be seen within a few hours to one day you should visit a local urgent care for assistance.  For non-urgent symptoms that need to be seen within a few days to a week you can schedule with an available provider in primary care by going to [place] or calling [phone].   If you are not sure how serious your symptoms are or you would like to receive medical advice you can always call [phone] to speak with a triage nurse.

## 2023-10-24 NOTE — PROGRESS NOTES
Virtual Visit Details    Type of service:  Video Visit   Video Start Time: 11:58  Video End Time:12:48    Originating Location (pt. Location): Home  Distant Location (provider location):  On-site  Platform used for Video Visit: Odette

## 2023-10-24 NOTE — TELEPHONE ENCOUNTER
"Pt calling to update team on sx.    Diarrhea- Pt states he had diarrhea last night again. This morning he waited to take hi Sorafenib until 0800 (typically takes it at 0630.) Took Loperamide 2 tablets this morning and that appears to be working today. No diarrhea so far    Cough- Pt states cough is getting worse. Comes on when he wakes up, around lunch time and right before bed. Mornings are worse, can last up to 30 min.   Combination of dry and \"then turn to phlegm.\" States when cough is productive he thinks its typically clear.     Pt is pushing fluids, trying to follow recommendation from Brenda to drink 93 oz.     Denies F/C, chest pain, SOB    Pt will be scheduling appt with internal medicine at 1130 today to discuss sx.    Pt also requesting refill on ondansetron. States he lost the rest of his prescription.    "

## 2023-10-24 NOTE — CONFIDENTIAL NOTE
Called patient to follow up on symptoms sent through a [facility] message on 10/24 at @ 0833. Left a voicemail message requesting the patient call [phone], option 5, option 2 and speak with any Triage nurse available.     Associated symptoms:tested positive for RSV on 10/20/23

## 2023-10-25 DIAGNOSIS — E11.3293 TYPE 2 DIABETES MELLITUS WITH MILD NONPROLIFERATIVE RETINOPATHY OF BOTH EYES WITHOUT MACULAR EDEMA, UNSPECIFIED WHETHER LONG TERM INSULIN USE (H): ICD-10-CM

## 2023-10-25 NOTE — PROGRESS NOTES
Frandy Workman is a 58 year old male year old who is being evaluated via a billable video visit.      How would you like to obtain your AVS? MyChart.  Will anyone else be joining your video visit? No      Video Start Time: 11:58      Subjective   Frandy Workman who presents for the following health issues:cough from RSV.     HPI   Pt tested positive for RSV on 10/20/23.  Last night he developed diarrhea.  He also has a cough at night when he lies down.  He has clear phlegm in the morning.  He notes right posterior flank discomfort and anterior mid-clavicular discomfort. He states his urine appears clear. He has been drinking alot of fluids.     Patient Active Problem List   Diagnosis     Bipolar affective disorder in remission (H24)     Esophageal varices determined by endoscopy (H)     Erectile dysfunction due to diseases classified elsewhere     HCC (hepatocellular carcinoma) (H)     Equivocal stress echocardiogram     Type 2 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, unspecified whether long term insulin use (H)     Status post coronary angiogram     CAD (coronary artery disease)     Liver transplant recipient (H)     Status post liver transplantation (H)     Immunosuppressed status (H24)     Benign essential hypertension     Chronic kidney disease, stage 3a (H)     Anemia of chronic renal failure, stage 3a (H)     History of coronary artery disease     Dyslipidemia     Excessive sweating     Stage 3b chronic kidney disease (H)     Anemia of chronic renal failure, stage 3b (H)     NSTEMI (non-ST elevated myocardial infarction) (H)     Chest pain, unspecified type     Hypertensive chronic kidney disease with stage 5 chronic kidney disease or end stage renal disease (H)     Vitreous hemorrhage of left eye (H)     Proliferative diabetic retinopathy of both eyes associated with type 2 diabetes mellitus, unspecified proliferative retinopathy type (H)     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      Objective     Vitals: No vitals were obtained today due to virtual visit.    Physical Exam   This is a video visit.   He stands and points to his right posterior back just slightly below the flank. Anteriorly he points to his right abdomen just inferior to the rib cage.     Assessment and Plan  Miller was seen today for recheck.    Diagnoses and all orders for this visit:    Acute bronchitis due to respiratory syncytial virus (RSV)  -     albuterol (PROAIR HFA/PROVENTIL HFA/VENTOLIN HFA) 108 (90 Base) MCG/ACT inhaler; Inhale 2 puffs into the lungs every 4 hours as needed for shortness of breath, wheezing or cough.      Video-Visit Details    Type of service:  Video Visit    Video End Time :12:14    Originating Location (pt. Location): Home    Distant Location (provider location):  [facility] [place]     Platform used for Video Visit: [facility]    Total time spent today with this patient including chart review, exam time with patient and documentation :     Samanta BAY, CNP

## 2023-10-25 NOTE — TELEPHONE ENCOUNTER
PRESCRIPTIONS MUST BE WRITTEN THIS WAY TO MAKE THEM MEDICARE COMPLIANT    FREESTYLE CLAUDIA 2 SENSORS  SIG: Change every 14 days.  QTY: 2  REFILLS: 5    -Prescriptions must be written after the clinical note date and will only be able to be used for 6 months from the date of the clinical notes. All prescriptions must be from same provider who patient had visit with. (We will be requesting new clinical notes and prescriptions every 6 months to meet Medicare Guidelines.)    Please contact us at [phone] (this number is for clinics only) with any questions. Please only give [phone] to patients.    [facility]   [address] [place] [address]  Phone # [phone]  Fax # [phone]

## 2023-10-26 ENCOUNTER — OFFICE VISIT (OUTPATIENT)
Dept: SLEEP MEDICINE | Facility: CLINIC | Age: 58
End: 2023-10-26
Payer: MEDICARE

## 2023-10-26 VITALS
HEIGHT: 69 IN | DIASTOLIC BLOOD PRESSURE: 70 MMHG | OXYGEN SATURATION: 99 % | WEIGHT: 173 LBS | BODY MASS INDEX: 25.62 KG/M2 | SYSTOLIC BLOOD PRESSURE: 116 MMHG | HEART RATE: 82 BPM

## 2023-10-26 DIAGNOSIS — G47.33 OSA (OBSTRUCTIVE SLEEP APNEA): Primary | ICD-10-CM

## 2023-10-26 PROCEDURE — 99214 OFFICE O/P EST MOD 30 MIN: CPT | Performed by: INTERNAL MEDICINE

## 2023-10-26 NOTE — PATIENT INSTRUCTIONS
You will get a call about setting up CPAP when a machine is available. Pick out a nasal or nasal pillow style mask and get a chin strap too incase you need it.    For general sleep health questions:   https://www.thensf.org/sleep-health-topics/  http://sleepeducation.org    Medicare and Medicaid patients starting on CPAP or biPAP on or after 5/12/2023  Medicare patients should schedule an IN PERSON CLINIC appointment to provide your CPAP/biPAP usage data to a provider within 90 days of starting CPAP    For new ResMed devices, sign up for device yuval to monitor your device for your followup visits  We encourage you to utilize the [employer] yuval or website (myAir web ([employer]) to monitor your therapy progress and share the data with your healthcare team when you discuss your sleep apnea.                                                      Know your equipment:  CPAP is continuous positive airway pressure that prevents obstructive sleep apnea by keeping the throat from collapsing while you are sleeping. In most cases, the device is  smart  and can slowly self-adjusts if your throat collapses and keeps a record every day of how well you are treated-this information is available to you and your care team.  BPAP is bilevel positive airway pressure that keeps your throat open and also assists each breath with a pressure boost to maintain adequate breathing.  Special kinds of BPAP are used in patients who have inadequate breathing from lung or heart disease. In most cases, the device is  smart  and can slowly self-adjusts to assist breathing. Like CPAP, the device keeps a record of how well you are treated.  Your mask is your connection to the device. You get to choose what feels most comfortable and the staff will help to make sure if fits. Here: are some examples of the different masks that are available:          Continue PAP therapy every night, for all hours that you are sleeping (including naps.)  As always,  try to get at least 8 hours of sleep or more each day, keep a regular sleep schedule, and avoid sleep deprivation. Avoid alcohol.  Reasons that you might need a change to your pressure therapy would be weight gain or loss, waking having inadvertently removed your PAP overnight, having previously felt refreshed by sleep with CPAP use and now waking un-refreshed, and return of daytime sleepiness. Also, the development of new medical problems  (such as heart failure, stroke, medications such as narcotics) can sometimes affect breathing at night and change your PAP therapy needs.  Please bring PAP with you if you are hospitalized.  If anticipating surgery be sure to discuss with your surgeon that you have sleep apnea and use PAP therapy.    Maintain your equipment as recommended which includes routine cleaning and replacement of supplies.      Call DME for any questions regarding supplies or maintenance.    [facility], [facility] [phone]    Do not drive on engage in potentially dangerous activities if feeling sleepy.          Tips for your PAP use-    Mask fitting tips  Mask fitting exercise:    To improve your mask seal and your mobility at night, put mask on and secure in place.  Lie down in bed with full pressure and roll to one side, adjust headgear while in that position to eliminate any leaks. Repeat process rolling to other side.     The mask seal does not have to be perfect:   CPAP machines are designed to make up for small leaks. However, you will not tolerate leaks blowing in your eyes so you will need to adjust.   Any leak should only be near or at the bottom of the mask.  We expect your mask to leak slightly at night.    Do not over-tighten the headgear straps, tighter IS NOT better, we expect minimal leak.    First try re-positioning the mask or headgear before tightening the headgear straps.  Mask leaks are expected due to changing sleeping positions. Try  pulling the mask away from your skin allowing the cushion to re-inflate will minimize the leak.  If you struggle for a good fit, try turning the CPAP off and then readjust the mask by pulling it away from your face and then turning back on the CPAP.        Humidifier tips  Humidifiers can be adjusted to increase or decrease the amount of moisture according to your comfort level. You may need to adjust this frequently at first, but then might only change it with seasonal weather changes.     Try INCREASING the humidity if:  You experience a dry, irritated nasal passage or throat.  You have a runny, drippy nose or sneezing fits after using CPAP.  You experience nasal congestion during or after CPAP use.    Try DECREASING the humidity if:  You have excessive condensation or  rain out  in the tubing or mask.  Otherwise keep the tubing warm during the night by running it underneath the blankets or pillow.      Clinic visit after initial PAP set-up   Bring your equipment with you to your 5-8 week follow up clinic visit.  We will be extracting your data from the machine if not available from the cloud based modem.        Travel  Always take your equipment with you when you travel.  If you fly with your equipment bring it on with you as a carry on.  Medical equipment does not count as a carry on.    If you travel international the machines take 110-240v.  The only adapter needed is the adapter that will fit into the receptacle (outlet).    You may also want to bring an extension cord as many hotel rooms have limited outlets at the bedside.  Do not travel with water in your humidifier chamber.     Cleaning and Maintenance Guidelines    Equipment Frequency Cleaning Method   Mask First Day    Daily      Weekly Soak mask in hot soapy water for 30 minutes, rinse and air dry.  Wipe nasal cushion with a hot soapy (Ivory, baby shampoo) cloth and rinse.  Baby wipes may also be used.  Do not use anti-bacterial soaps,Marcelle  liquid  soap, rubbing alcohol, bleach or ammonia.  Wash frame in hot soapy water (Ivory, baby shampoo) rinse and let air dry   Headgear Biweekly Wash in hot soapy water, rinse and air dry   Reusable Gray Filter Weekly Wash in hot soapy water, rinse, put in towel squeeze moisture out, let air dry   Disposable White Filter Check Weekly Replace when brown or gray in color; at least every 2 to 3 months   Humidifier Chamber Daily    Weekly Empty distilled water from humidifier and let air dry    Hand wash in hot soapy water, rinse and air dry   Tubing Weekly Wash in hot soapy water, rinse and let air dry   Mask, Tubing and Humidifier Chamber As needed Disinfect: Soak in 1 part distilled white vinegar to 3 parts hot water for 30 minutes, rinse well and air dry  Not the material headgear        MASK AND SUPPLY REORDERING and EQUIPMENT NEEDS through your DME and per your insurance  Reminder: Most insurance companies will allow for a new mask, headgear, tubing, and reusable gray filter every six months.  Disposable white ultra-fine filters are covered monthly.      HOME AND SAFETY INSTRUCTIONS  Do not use frayed or cracked electrical cords, multi plug adaptors, or switched receptacles  Do not immerse electrical equipment into water  Assure that electrical cords do not become a tripping hazard Your BMI is Body mass index is 25.55 kg/m .    What is BMI?  Body mass index (BMI) is one way to tell whether you are at a healthy weight, overweight, or obese. It measures your weight in relation to your height.  A BMI of 18.5 to 24.9 is in the healthy range. A person with a BMI of 25 to 29.9 is considered overweight, and someone with a BMI of 30 or greater is considered obese.  Another way to find out if you are at risk for health problems caused by overweight and obesity is to measure your waist. If you are a woman and your waist is more than 35 inches, or if you are a man and your waist is more than 40 inches, your risk of disease may be  higher.  More than two-thirds of American adults are considered overweight or obese. Being overweight or obese increases the risk for further weight gain.  Excess weight may lead to heart disease and diabetes. Creating and following plans for healthy eating and physical activity may help you improve your health.    Methods for maintaining or losing weight.  Weight control is part of healthy lifestyle and includes exercise, emotional health, and healthy eating habits.  Careful eating habits lifelong is the mainstay of weight control.  Though there are significant health benefits from weight loss, long-term weight loss with diet alone may be very difficult to achieve- studies show long-term success with dietary management in less than 10% of people. Attaining a healthy weight may be especially difficult to achieve in those with severe obesity. In some cases, medications, devices and surgical management might be considered.    What can you do?  If you are overweight or obese and are interested in methods for weight loss, you should discuss this with your provider. In addition, we recommend that you review healthy life styles and methods for weight loss available through the National Institutes of Health patient information sites:   http://win.niddk.nih.gov/publications/index.htm

## 2023-10-26 NOTE — PROGRESS NOTES
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  He has problems with daytime fatigue.  The results of in lab polysomnography were reviewed with him in detail today.  They showed overall moderate sleep apnea.  However there was very severe sleep apnea when sleeping supine, mild when sleeping lateral.  Overall there was mild associated hypoxemia.  There is more fragmented sleep when sleeping supine.  Heart rates were occasionally elevated greater than 90 but rhythm was normal.    Patient reports that he usually sleeps on his sides.  He just was more comfortable on his sides.  He does have nocturia.  Denies symptoms of restless legs.    We reviewed previous evaluation and upon further discussion it appears that the previous test that he had was not a sleep study but was a daytime EEG.    Avenal Sleepiness Scale  Total score - Avenal: 4 (10/19/2023  1:08 PM)   (Less than 10 normal)    Insomnia Severity Scale  COREY Total Score: 11  (normal 0-7, mild 8-14, moderate 15-21, severe 22-28)    Past Medical History:   Diagnosis Date    Anemia 2013    Arthritis     BPH (benign prostatic hyperplasia)     CAD (coronary artery disease) 04/01/2019    Cholelithiasis     Conductive hearing loss 08/16/2017    Depressive disorder 1986    Suffer effects throughout life    Gastroesophageal reflux disease 12/01/2014    HCC (hepatocellular carcinoma) (H) 01/22/2019    History of diabetic retinopathy 07/2018    HTN (hypertension) 11/20/2019    Hyperlipidemia     Liver cirrhosis secondary to ESTRADA (H)     Liver transplanted (H) 11/11/2019    Portal vein thrombosis 04/11/2019    Type II diabetes mellitus (H)        Allergies   Allergen Reactions    Codeine Other (See Comments)     Cannot take due to liver  Cannot tolerate oral narcotics    Seasonal Allergies       Sneezing, coughing, runny and itchy eyes       Current Outpatient Medications   Medication    albuterol (PROAIR HFA/PROVENTIL HFA/VENTOLIN HFA) 108 (90 Base) MCG/ACT inhaler    Alcohol Swabs (ALCOHOL PREP) 70 % PADS    ammonium lactate (AMLACTIN) 12 % external cream    [START ON 11/9/2023] apixaban ANTICOAGULANT (ELIQUIS) 5 MG tablet    BD VIKTORIA U/F 32G X 4 MM insulin pen needle    benzoyl peroxide 5 % external liquid    blood glucose (NO BRAND SPECIFIED) lancets standard    Continuous Blood Gluc Sensor (FREESTYLE CLAUDIA 2 SENSOR) MISC    insulin aspart (NOVOLOG PEN) 100 UNIT/ML pen    insulin degludec (TRESIBA FLEXTOUCH) 100 UNIT/ML pen    K-Phos-Neutral 155-852-130 MG tablet    ketoconazole (NIZORAL) 2 % external shampoo    lamiVUDine (EPIVIR) 100 MG tablet    lisinopril (ZESTRIL) 5 MG tablet    loperamide (IMODIUM A-D) 2 MG tablet    metoprolol succinate ER (TOPROL XL) 25 MG 24 hr tablet    Multiple Vitamin (TAB-A-GLADIS) TABS    mycophenolate (GENERIC EQUIVALENT) 250 MG capsule    NIFEdipine ER OSMOTIC (PROCARDIA XL) 60 MG 24 hr tablet    ondansetron (ZOFRAN ODT) 8 MG ODT tab    pantoprazole (PROTONIX) 40 MG EC tablet    predniSONE (DELTASONE) 5 MG tablet    rosuvastatin (CRESTOR) 20 MG tablet    SORAfenib (NEXAVAR) 200 MG tablet    tamsulosin (FLOMAX) 0.4 MG capsule    Vitamin D3 50 mcg (2000 units) tablet     Current Facility-Administered Medications   Medication    aflibercept (EYLEA) injection prefilled syringe 2 mg    aflibercept (EYLEA) injection prefilled syringe 2 mg    dexamethasone (OZURDEX) intravitreal implant 0.7 mg    dexamethasone (OZURDEX) intravitreal implant 0.7 mg    faricimab-svoa (VABYSMO) intravitreal injection 6 mg    faricimab-svoa (VABYSMO) intravitreal injection 6 mg    lidocaine (PF) (XYLOCAINE) injection 1 mL       Social History     Socioeconomic History    Marital status:      Spouse name: Not on file    Number of children: Not on file    Years of education: Not on file     Highest education level: Bachelor's degree (e.g., BA, AB, BS)   Occupational History    Not on file   Tobacco Use    Smoking status: Former     Packs/day: 6.00     Years: 30.00     Additional pack years: 0.00     Total pack years: 180.00     Types: Cigars, Cigarettes     Start date: 2016     Quit date: 10/25/2017     Years since quittin.0     Passive exposure: Past    Smokeless tobacco: Former     Types: Chew     Quit date: 10/31/2017    Tobacco comments:     1 tin per week   Vaping Use    Vaping Use: Never used   Substance and Sexual Activity    Alcohol use: No     Alcohol/week: 0.0 standard drinks of alcohol     Comment: quit 1996    Drug use: No    Sexual activity: Not Currently     Partners: Female     Birth control/protection: Condom   Other Topics Concern    Parent/sibling w/ CABG, MI or angioplasty before 65F 55M? Yes   Social History Narrative    Prior [facility]     Social Determinants of Health     Financial Resource Strain: Low Risk  (10/24/2023)    Financial Resource Strain     Within the past 12 months, have you or your family members you live with been unable to get utilities (heat, electricity) when it was really needed?: No   Food Insecurity: Low Risk  (10/24/2023)    Food Insecurity     Within the past 12 months, did you worry that your food would run out before you got money to buy more?: No     Within the past 12 months, did the food you bought just not last and you didn t have money to get more?: No   Transportation Needs: Low Risk  (10/24/2023)    Transportation Needs     Within the past 12 months, has lack of transportation kept you from medical appointments, getting your medicines, non-medical meetings or appointments, work, or from getting things that you need?: No   Physical Activity: Insufficiently Active (10/13/2020)    Exercise Vital Sign     Days of Exercise per Week: 1 day     Minutes of Exercise per Session: 60 min   Stress: Stress Concern Present (10/13/2020)  "   [facility] of Occupational Health - Occupational Stress Questionnaire     Feeling of Stress : To some extent   Social Connections: Socially Isolated (10/13/2020)    Social Connection and Isolation Panel [NHANES]     Frequency of Communication with Friends and Family: Once a week     Frequency of Social Gatherings with Friends and Family: Once a week     Attends Orthodoxy Services: Never     Active Member of Clubs or Organizations: No     Attends Club or Organization Meetings: Never     Marital Status:    Interpersonal Safety: Not At Risk (6/28/2023)    Humiliation, Afraid, Rape, and Kick questionnaire     Fear of Current or Ex-Partner: No     Emotionally Abused: No     Physically Abused: No     Sexually Abused: No   Housing Stability: Low Risk  (10/24/2023)    Housing Stability     Do you have housing? : Yes     Are you worried about losing your housing?: No       Family History   Problem Relation Age of Onset    Skin Cancer Mother     Cancer Mother         mastectomy    Diabetes Mother     Cerebrovascular Disease Mother     Thyroid Disease Mother     Depression Mother     Colon Cancer Father 60    Pancreatic Cancer Father 60    Prostate Cancer Father     Macular Degeneration Father     Glaucoma Father     Skin Cancer Father     Thyroid Disease Sister     Depression Sister     Asthma Sister     Sleep Apnea Brother     Colorectal Cancer Maternal Grandmother     Cancer Maternal Grandmother     Substance Abuse Maternal Grandmother         Alcohol    Prostate Cancer Maternal Grandfather     Substance Abuse Maternal Grandfather         Alcohol    Colorectal Cancer Paternal Grandmother     Liver Disease No family hx of     Melanoma No family hx of     Anesthesia Reaction No family hx of     Deep Vein Thrombosis (DVT) No family hx of            EXAM:  /70   Pulse 82   Ht 1.753 m (5' 9\")   Wt 78.5 kg (173 lb)   SpO2 99%   BMI 25.55 kg/m    GENERAL: Alert and no distress  EYES: Eyes grossly normal " to inspection.  No obvious scleral/conjunctival abnormalities.  RESP: No audible wheeze, cough, or visible cyanosis.  No visible retractions or increased work of breathing.    SKIN: Visible skin clear. No significant rash, abnormal pigmentation or lesions.  NEURO:   Mentation and speech appropriate for age.  PSYCH: Mentation appears normal, affect normal, judgement and insight intact, normal speech and appearance well-groomed.       PSG 10/4/2023 [facility]  Weight 179 lbs, BMI 26.5  AHI 29.1, Supine AHI 89, 17% events central in nature), Lowest O2 sat 76%  Time Spent ?88% 7.7 minutes       TSH   Date Value Ref Range Status   04/25/2022 1.24 0.40 - 4.00 mU/L Final   01/28/2021 0.91 0.40 - 4.00 mU/L Final       ASSESSMENT:  58-year-old gentleman with history of liver transplantation for ESTRADA, alpha-1 MZ phenotype, daytime fatigue, snoring and weight gain.  He is found to have overall moderate supine predominant obstructive sleep apnea.  Treatment of obstructive sleep apnea is medically indicated.    PLAN:  We discussed the option of trying an automatically adjusting CPAP.  Possible that that could aggravate some of the centrals.  However patient is interested in trying.  He is encouraged to use a nasal mask with chinstrap if needed.  He does have a titration study scheduled if needed as well.  However if he does well in hospital we will cancel.      33 minutes spent by me on the date of the encounter doing chart review, history and exam, documentation and further activities per the note    Anu Simpson M.D.  Pulmonary/Critical Care/Sleep Medicine    [facility]   Floor 1, [address]   [address] [place] [address]. [place] [address]   Appointments: [phone]    The above note was dictated using voice recognition software and may include typographical errors. Please contact the author for any clarifications.

## 2023-10-30 ENCOUNTER — ONCOLOGY VISIT (OUTPATIENT)
Dept: ONCOLOGY | Facility: CLINIC | Age: 58
End: 2023-10-30
Attending: NURSE PRACTITIONER
Payer: MEDICARE

## 2023-10-30 ENCOUNTER — LAB (OUTPATIENT)
Dept: LAB | Facility: CLINIC | Age: 58
End: 2023-10-30
Payer: MEDICARE

## 2023-10-30 VITALS
OXYGEN SATURATION: 100 % | TEMPERATURE: 98.5 F | RESPIRATION RATE: 16 BRPM | SYSTOLIC BLOOD PRESSURE: 153 MMHG | WEIGHT: 175.9 LBS | BODY MASS INDEX: 25.98 KG/M2 | HEART RATE: 92 BPM | DIASTOLIC BLOOD PRESSURE: 79 MMHG

## 2023-10-30 DIAGNOSIS — C22.0 HCC (HEPATOCELLULAR CARCINOMA) (H): Primary | ICD-10-CM

## 2023-10-30 DIAGNOSIS — E83.39 HYPOPHOSPHATEMIA: ICD-10-CM

## 2023-10-30 DIAGNOSIS — C22.0 HCC (HEPATOCELLULAR CARCINOMA) (H): ICD-10-CM

## 2023-10-30 DIAGNOSIS — D84.9 IMMUNOSUPPRESSED STATUS (H): ICD-10-CM

## 2023-10-30 DIAGNOSIS — C78.6 MALIGNANT NEOPLASM METASTATIC TO PERITONEUM (H): ICD-10-CM

## 2023-10-30 DIAGNOSIS — J21.0 RSV BRONCHIOLITIS: ICD-10-CM

## 2023-10-30 DIAGNOSIS — I82.452 ACUTE DEEP VEIN THROMBOSIS (DVT) OF LEFT PERONEAL VEIN (H): ICD-10-CM

## 2023-10-30 DIAGNOSIS — E83.42 HYPOMAGNESEMIA: ICD-10-CM

## 2023-10-30 LAB
ALBUMIN SERPL BCG-MCNC: 3.6 G/DL (ref 3.5–5.2)
ALP SERPL-CCNC: 94 U/L (ref 40–129)
ALT SERPL W P-5'-P-CCNC: 28 U/L (ref 0–70)
ANION GAP SERPL CALCULATED.3IONS-SCNC: 9 MMOL/L (ref 7–15)
AST SERPL W P-5'-P-CCNC: 36 U/L (ref 0–45)
BASOPHILS # BLD AUTO: 0 10E3/UL (ref 0–0.2)
BASOPHILS NFR BLD AUTO: 0 %
BILIRUB SERPL-MCNC: 0.3 MG/DL
BUN SERPL-MCNC: 17.5 MG/DL (ref 8–23)
CALCIUM SERPL-MCNC: 7.2 MG/DL (ref 8.6–10)
CHLORIDE SERPL-SCNC: 105 MMOL/L (ref 98–107)
CREAT SERPL-MCNC: 1.3 MG/DL (ref 0.67–1.17)
DEPRECATED HCO3 PLAS-SCNC: 23 MMOL/L (ref 22–29)
EGFRCR SERPLBLD CKD-EPI 2021: 63 ML/MIN/1.73M2
EOSINOPHIL # BLD AUTO: 0.1 10E3/UL (ref 0–0.7)
EOSINOPHIL NFR BLD AUTO: 2 %
ERYTHROCYTE [DISTWIDTH] IN BLOOD BY AUTOMATED COUNT: 14.9 % (ref 10–15)
GLUCOSE SERPL-MCNC: 140 MG/DL (ref 70–99)
HCT VFR BLD AUTO: 28.9 % (ref 40–53)
HGB BLD-MCNC: 9.2 G/DL (ref 13.3–17.7)
IMM GRANULOCYTES # BLD: 0.1 10E3/UL
IMM GRANULOCYTES NFR BLD: 1 %
LYMPHOCYTES # BLD AUTO: 0.7 10E3/UL (ref 0.8–5.3)
LYMPHOCYTES NFR BLD AUTO: 14 %
MAGNESIUM SERPL-MCNC: 1.3 MG/DL (ref 1.7–2.3)
MCH RBC QN AUTO: 31.5 PG (ref 26.5–33)
MCHC RBC AUTO-ENTMCNC: 31.8 G/DL (ref 31.5–36.5)
MCV RBC AUTO: 99 FL (ref 78–100)
MONOCYTES # BLD AUTO: 0.2 10E3/UL (ref 0–1.3)
MONOCYTES NFR BLD AUTO: 5 %
NEUTROPHILS # BLD AUTO: 3.8 10E3/UL (ref 1.6–8.3)
NEUTROPHILS NFR BLD AUTO: 78 %
NRBC # BLD AUTO: 0 10E3/UL
NRBC BLD AUTO-RTO: 0 /100
PHOSPHATE SERPL-MCNC: 2.3 MG/DL (ref 2.5–4.5)
PLATELET # BLD AUTO: 116 10E3/UL (ref 150–450)
POTASSIUM SERPL-SCNC: 4.6 MMOL/L (ref 3.4–5.3)
PROT SERPL-MCNC: 6 G/DL (ref 6.4–8.3)
RBC # BLD AUTO: 2.92 10E6/UL (ref 4.4–5.9)
SODIUM SERPL-SCNC: 137 MMOL/L (ref 135–145)
WBC # BLD AUTO: 4.8 10E3/UL (ref 4–11)

## 2023-10-30 PROCEDURE — 99214 OFFICE O/P EST MOD 30 MIN: CPT | Performed by: NURSE PRACTITIONER

## 2023-10-30 PROCEDURE — 36415 COLL VENOUS BLD VENIPUNCTURE: CPT | Performed by: PATHOLOGY

## 2023-10-30 PROCEDURE — 84100 ASSAY OF PHOSPHORUS: CPT | Performed by: PATHOLOGY

## 2023-10-30 PROCEDURE — 83735 ASSAY OF MAGNESIUM: CPT | Performed by: PATHOLOGY

## 2023-10-30 PROCEDURE — 80053 COMPREHEN METABOLIC PANEL: CPT | Performed by: PATHOLOGY

## 2023-10-30 PROCEDURE — 85025 COMPLETE CBC W/AUTO DIFF WBC: CPT | Performed by: PATHOLOGY

## 2023-10-30 PROCEDURE — G0463 HOSPITAL OUTPT CLINIC VISIT: HCPCS | Performed by: NURSE PRACTITIONER

## 2023-10-30 RX ORDER — MAGNESIUM OXIDE 400 MG/1
400 TABLET ORAL DAILY
Qty: 3 TABLET | Refills: 0 | Status: SHIPPED | OUTPATIENT
Start: 2023-10-30 | End: 2023-11-02

## 2023-10-30 ASSESSMENT — PAIN SCALES - GENERAL: PAINLEVEL: MODERATE PAIN (4)

## 2023-10-30 NOTE — NURSING NOTE
"  Oncology Rooming Note    October 30, 2023 2:31 PM   Frandy Workman is a 58 year old male who presents for:    Chief Complaint   Patient presents with    Oncology Clinic Visit     Malignant neoplasm metastatic to peritoneum     Initial Vitals: BP (!) 153/79 (BP Location: Right arm, Patient Position: Sitting, Cuff Size: Adult Regular)   Pulse 92   Temp 98.5  F (36.9  C) (Oral)   Resp 16   Wt 79.8 kg (175 lb 14.4 oz)   SpO2 100%   BMI 25.98 kg/m   Estimated body mass index is 25.98 kg/m  as calculated from the following:    Height as of 10/26/23: 1.753 m (5' 9\").    Weight as of this encounter: 79.8 kg (175 lb 14.4 oz). Body surface area is 1.97 meters squared.  Moderate Pain (4) Comment: Data Unavailable   No LMP for male patient.  Allergies reviewed: Yes  Medications reviewed: Yes    Medications: Medication refills not needed today.  Pharmacy name entered into [facility]:    [facility] - [place], [place] - [address] [facility] [place], [place] - [address] [place] [address] [phone]  [id number] [place] [address]    Clinical concerns: Patient states there are no new concerns to discuss with provider.        Radha Soares              "

## 2023-10-30 NOTE — LETTER
10/30/2023         RE: Frandy Workman  [address] [address]        Dear Colleague,    Thank you for referring your patient, Frandy Workman, to the [facility]. Please see a copy of my visit note below.    Reason for Visit: seen in follow-up of recurrent hepataocellular carcinoma    Oncology HPI:     Miller Workman is a 58 year old man with a PMH of DMII, bipolar 1 disorder, depressed with LIZZETTE, cirrhosis s/t ESTRADA with subsequent development of HCC, s/p liver transplant , HLD, HTN, GERD, BPH and CAD with hx of 2 vessel CABG in July 2021, CKD with anemia of chronic disease, on erythropoetin.      He as diagnosed with hepatocellular carcinoma in December 2018 when he was found to have 2 LIRADS 5 lesions on surveillance imaging. He underwent TACE on 1/22/19.  He is s/p  liver transplant on 11/11/2019. The explanted liver had 2 lesions that were mostly necrotic and less than 3 cm with no clearly identifiable vascular invasion.       He was found to have peritoneal masses on routine surveillance imaging in June 2023. He underwent laparoscopy with biopsies and pericecal mass resection confirming metastatic HCC.      He met with Dr. Villarreal in July with recommendation to start sorafenib. He started 400 mg BID on 7/29/23.      He was admitted 8/10-8/16 with hyperkalemia, acute kidney injury. He required one round of hemodialysis. He was noted to have thrombocytopenia with a platelet count from 178 t9 97. A HIT VAHID was negative.   Renal function and hyperkalemia improved through the course of hospitalization. He was followed by nephrology. He was drinking 1.6 L of fluid.  Jardiance and lisinopril held. The cause of the JERONIMO was in the context of severe nausea and vomiting and diarrhea prior to admission.       He resumed sorafenib 200 mg/day on 9/12/23.  He had repeat CT imaging when he saw  on 9/21/23 that was stable. He was recommended to continue sorafenib, the dose  was increased to 200 mg bid, then increased further to 200/400 mg bid on 10/6/23.      He presented with leg swelling on 10/11/23 and was found to have a DVT in the left peroneal vein. He was started on eliquis.     He increased sorafenib to 400 mg bid on 10/20/23.     Was found to have RSV on 10/20/23.    Interval history:   Miller started to have increased diarrhea, up to 5/day last week. Was maxing out the imodium at 8/day. Starting to feel poorly and was having increased leg pain and cramping. He decreased the sorafenib back to 200 mg/400 mg 3 days ago. His stools are returning back to normal. Muscle pain and cramping is a little better after he saw the chiropractor today. Nausea is well controlled. He is eating and drinking well. Pushed up to 200-300 ounces of fluid daily over the weekend. Glucoses in the 140 range. BPs are about the same, running a little higher than what he would like.   -no bleeding and bruising since stopping aspiring. Remains on Eliquis for the DVT. Had some bruising on the forearms that are resolving  -continues to have a loose cough, worse in the AM. No fevers or chest pain. No wheezing. Has been very active, walking up to 12652 steps/day.    Current Outpatient Medications   Medication Sig Dispense Refill    albuterol (PROAIR HFA/PROVENTIL HFA/VENTOLIN HFA) 108 (90 Base) MCG/ACT inhaler Inhale 2 puffs into the lungs every 4 hours as needed for shortness of breath, wheezing or cough 18 g 1    Alcohol Swabs (ALCOHOL PREP) 70 % PADS Use for glucose testing 4x daily  and insulin administration 4x daily. 400 each 1    ammonium lactate (AMLACTIN) 12 % external cream Apply topically daily as needed for dry skin (on feet) 140 g 5    [START ON 11/9/2023] apixaban ANTICOAGULANT (ELIQUIS) 5 MG tablet Take 1 tablet (5 mg) by mouth 2 times daily 60 tablet 3    BD VIKTORIA U/F 32G X 4 MM insulin pen needle Use 5 per day 300 each 3    benzoyl peroxide 5 % external liquid Use topically in showers as a body  wash 226 g 11    blood glucose (NO BRAND SPECIFIED) lancets standard Use to test blood sugar 1 times daily or as directed. 100 each 11    Continuous Blood Gluc Sensor (FREESTYLE CLAUDIA 2 SENSOR) MISC 1 each See Admin Instructions Change every 14 days. 7 each 3    insulin aspart (NOVOLOG PEN) 100 UNIT/ML pen Inject 5-10 Units Subcutaneous 4 times daily (with meals and nightly) 1unit : 8 g carb before meals.  Also add 1 unit : 50 mg/dl >180 before meals and at bedtime. 45 mL 3    insulin degludec (TRESIBA FLEXTOUCH) 100 UNIT/ML pen Inject 15 units subcutaneous once daily 45 mL 3    K-Phos-Neutral 155-852-130 MG tablet Take 1 tablet by mouth 4 times daily 120 tablet 1    ketoconazole (NIZORAL) 2 % external shampoo Apply thin layer topically to scalp in shower (leave on 5 min prior to rinse); may also use as a body wash 120 mL 11    lamiVUDine (EPIVIR) 100 MG tablet Take 1 tablet (100 mg) by mouth daily 90 tablet 3    lisinopril (ZESTRIL) 5 MG tablet Take 1 tablet (5 mg) by mouth daily 90 tablet 3    loperamide (IMODIUM A-D) 2 MG tablet Take 1-2 tablets (2-4 mg) by mouth 4 times daily as needed for diarrhea 120 tablet 3    metoprolol succinate ER (TOPROL XL) 25 MG 24 hr tablet Take 1 tablet (25 mg) by mouth daily 45 tablet 1    Multiple Vitamin (TAB-A-GLADIS) TABS TAKE ONE TABLET BY MOUTH ONCE DAILY 90 tablet 0    mycophenolate (GENERIC EQUIVALENT) 250 MG capsule Take 2 capsules (500 mg) by mouth every 12 hours 120 capsule 11    NIFEdipine ER OSMOTIC (PROCARDIA XL) 60 MG 24 hr tablet Take 1 tablet (60 mg) by mouth 2 times daily 60 tablet 11    ondansetron (ZOFRAN ODT) 8 MG ODT tab Take 1 tablet (8 mg) by mouth every 8 hours as needed for nausea 30 tablet 3    pantoprazole (PROTONIX) 40 MG EC tablet Take 1 tablet (40 mg) by mouth daily before breakfast 90 tablet 3    predniSONE (DELTASONE) 5 MG tablet Take 1 tablet (5 mg) by mouth daily 90 tablet 3    rosuvastatin (CRESTOR) 20 MG tablet Take 1 tablet (20 mg) by mouth daily  90 tablet 3    SORAfenib (NEXAVAR) 200 MG tablet Take 200 mg in AM and 400 mg in PM on an empty stomach 1 hour before or 2 hours after a meal. Meet with oncology in 2 weeks to discuss ramp up to 400 mg twice daily 120 tablet 0    tamsulosin (FLOMAX) 0.4 MG capsule Take 1 capsule (0.4 mg) by mouth daily 90 capsule 3    Vitamin D3 50 mcg (2000 units) tablet Take 1 tablet (50 mcg) by mouth daily 90 tablet 3          Allergies   Allergen Reactions    Codeine Other (See Comments)     Cannot take due to liver  Cannot tolerate oral narcotics    Seasonal Allergies      Sneezing, coughing, runny and itchy eyes         Exam: alert, appears well.  Blood pressure (!) 153/79, pulse 92, temperature 98.5  F (36.9  C), temperature source Oral, resp. rate 16, weight 79.8 kg (175 lb 14.4 oz), SpO2 100%.  Wt Readings from Last 4 Encounters:   10/26/23 78.5 kg (173 lb)   10/20/23 77.1 kg (170 lb)   10/11/23 80.7 kg (178 lb)   10/06/23 78 kg (172 lb)     Oropharynx is moist and without lesion. Neck supple and without adenopathy. Lungs:CTA. Heart: RRR, no murmur or rub. Abdomen: soft, nontender, BS active, no masses or organomegaly.  Extremities: warm, no edema. Speech is clear. CN wnl. Gait/station wnl. Skin: bruising on the left forearm       Labs: I reviewed the following labs.   Latest Reference Range & Units 10/30/23 13:43   Sodium 135 - 145 mmol/L 137   Potassium 3.4 - 5.3 mmol/L 4.6   Chloride 98 - 107 mmol/L 105   Carbon Dioxide (CO2) 22 - 29 mmol/L 23   Urea Nitrogen 8.0 - 23.0 mg/dL 17.5   Creatinine 0.67 - 1.17 mg/dL 1.30 (H)   GFR Estimate >60 mL/min/1.73m2 63   Calcium 8.6 - 10.0 mg/dL 7.2 (L)   Anion Gap 7 - 15 mmol/L 9   Magnesium 1.7 - 2.3 mg/dL 1.3 (L)   Phosphorus 2.5 - 4.5 mg/dL 2.3 (L)   Albumin 3.5 - 5.2 g/dL 3.6   Protein Total 6.4 - 8.3 g/dL 6.0 (L)   Alkaline Phosphatase 40 - 129 U/L 94   ALT 0 - 70 U/L 28   AST 0 - 45 U/L 36   Bilirubin Total <=1.2 mg/dL 0.3   Glucose 70 - 99 mg/dL 140 (H)   WBC 4.0 - 11.0  10e3/uL 4.8   Hemoglobin 13.3 - 17.7 g/dL 9.2 (L)   Hematocrit 40.0 - 53.0 % 28.9 (L)   Platelet Count 150 - 450 10e3/uL 116 (L)   RBC Count 4.40 - 5.90 10e6/uL 2.92 (L)   MCV 78 - 100 fL 99   MCH 26.5 - 33.0 pg 31.5   MCHC 31.5 - 36.5 g/dL 31.8   RDW 10.0 - 15.0 % 14.9   % Neutrophils % 78   % Lymphocytes % 14   % Monocytes % 5   % Eosinophils % 2   % Basophils % 0   Absolute Basophils 0.0 - 0.2 10e3/uL 0.0   Absolute Eosinophils 0.0 - 0.7 10e3/uL 0.1   Absolute Immature Granulocytes <=0.4 10e3/uL 0.1   Absolute Lymphocytes 0.8 - 5.3 10e3/uL 0.7 (L)   Absolute Monocytes 0.0 - 1.3 10e3/uL 0.2   % Immature Granulocytes % 1   Absolute Neutrophils 1.6 - 8.3 10e3/uL 3.8   Absolute NRBCs 10e3/uL 0.0   NRBCs per 100 WBC <1 /100 0   (H): Data is abnormally high  (L): Data is abnormally low        Impression/plan:     Recurrent HCC  -started sorafenib 400 mg BID on 7/29.  Tolerated the first several days well, except for some diarrhea. Then developed acute N/V 4 days prior to admission for renal failure and hyperkalemia.   -he had an extended break off of therapy as he was traveling over the labor day weekend. He resumed 200 mg daily on 9/8.   -dose was slowly increased to 400 mg bid. He was on this dose for about a week or so and had similar toxicities with increased diarrhea and muscle cramping/pain. He has decreased to 200 mg/400 mg dosing and tolerating that better.  -I agree, he should remain at the 200 mg/400 mg dosing and not try to escalate again  -I will see him in 2 weeks to follow-up on toxicities and lab monitoring  -he will see Dr. Villarreal in 1 month with scans to assess his response    Hypophos--s/t sorafenib, common side effect, improving with phos replacement, will continue QID dosing    Hypomag--s/t diarrhea  -diarrhea now resolved. Will give 3 days of mag gluconate and recheck in 2 weeks    L peroneal DVT, dx 10/11/23  -on Eliquis, will remain on indefinitely with his malignancy  -less bruising and bleeding  since stopping concomitant ASA    Thrombocytopenia, s/t sorafenib, no evidence of HIT on VAHID while hospitalized  -slow trend down, but stable to continue sorafenib and anticoagulation. Will recheck in 2 weeks     Hx of liver transplant  -immunosuppression with MMF, prednisone as per Dr. Banuelos     CAD s/p 2 vessel CABG 2021  HTN  CKD  -following with  and Dr. Bhatt  -on metoprolol, recently started on nifedipine 60 mg bid  -BP mildly elevated, continues to monitor      RSV  Clinically stable, still has a cough, but not worsening  Continues to use albuterol prn    -should proceed with COVID vaccination when he is recovered from RSV    Sincerely,        Brenda Wilson, SHANIQUE CNP

## 2023-10-30 NOTE — PROGRESS NOTES
Reason for Visit: seen in follow-up of recurrent hepataocellular carcinoma    Oncology HPI:     Miller Workman is a 58 year old man with a PMH of DMII, bipolar 1 disorder, depressed with LIZZETTE, cirrhosis s/t ESTRADA with subsequent development of HCC, s/p liver transplant , HLD, HTN, GERD, BPH and CAD with hx of 2 vessel CABG in July 2021, CKD with anemia of chronic disease, on erythropoetin.      He as diagnosed with hepatocellular carcinoma in December 2018 when he was found to have 2 LIRADS 5 lesions on surveillance imaging. He underwent TACE on 1/22/19.  He is s/p  liver transplant on 11/11/2019. The explanted liver had 2 lesions that were mostly necrotic and less than 3 cm with no clearly identifiable vascular invasion.       He was found to have peritoneal masses on routine surveillance imaging in June 2023. He underwent laparoscopy with biopsies and pericecal mass resection confirming metastatic HCC.      He met with Dr. Villarreal in July with recommendation to start sorafenib. He started 400 mg BID on 7/29/23.      He was admitted 8/10-8/16 with hyperkalemia, acute kidney injury. He required one round of hemodialysis. He was noted to have thrombocytopenia with a platelet count from 178 t9 97. A HIT VAHID was negative.   Renal function and hyperkalemia improved through the course of hospitalization. He was followed by nephrology. He was drinking 1.6 L of fluid.  Jardiance and lisinopril held. The cause of the JERONIMO was in the context of severe nausea and vomiting and diarrhea prior to admission.       He resumed sorafenib 200 mg/day on 9/12/23.  He had repeat CT imaging when he saw  on 9/21/23 that was stable. He was recommended to continue sorafenib, the dose was increased to 200 mg bid, then increased further to 200/400 mg bid on 10/6/23.      He presented with leg swelling on 10/11/23 and was found to have a DVT in the left peroneal vein. He was started on eliquis.     He increased sorafenib to 400 mg bid on  10/20/23.     Was found to have RSV on 10/20/23.    Interval history:   Miller started to have increased diarrhea, up to 5/day last week. Was maxing out the imodium at 8/day. Starting to feel poorly and was having increased leg pain and cramping. He decreased the sorafenib back to 200 mg/400 mg 3 days ago. His stools are returning back to normal. Muscle pain and cramping is a little better after he saw the chiropractor today. Nausea is well controlled. He is eating and drinking well. Pushed up to 200-300 ounces of fluid daily over the weekend. Glucoses in the 140 range. BPs are about the same, running a little higher than what he would like.   -no bleeding and bruising since stopping aspiring. Remains on Eliquis for the DVT. Had some bruising on the forearms that are resolving  -continues to have a loose cough, worse in the AM. No fevers or chest pain. No wheezing. Has been very active, walking up to 87366 steps/day.    Current Outpatient Medications   Medication Sig Dispense Refill    albuterol (PROAIR HFA/PROVENTIL HFA/VENTOLIN HFA) 108 (90 Base) MCG/ACT inhaler Inhale 2 puffs into the lungs every 4 hours as needed for shortness of breath, wheezing or cough 18 g 1    Alcohol Swabs (ALCOHOL PREP) 70 % PADS Use for glucose testing 4x daily  and insulin administration 4x daily. 400 each 1    ammonium lactate (AMLACTIN) 12 % external cream Apply topically daily as needed for dry skin (on feet) 140 g 5    [START ON 11/9/2023] apixaban ANTICOAGULANT (ELIQUIS) 5 MG tablet Take 1 tablet (5 mg) by mouth 2 times daily 60 tablet 3    BD VIKTORIA U/F 32G X 4 MM insulin pen needle Use 5 per day 300 each 3    benzoyl peroxide 5 % external liquid Use topically in showers as a body wash 226 g 11    blood glucose (NO BRAND SPECIFIED) lancets standard Use to test blood sugar 1 times daily or as directed. 100 each 11    Continuous Blood Gluc Sensor (FREESTYLE CLAUDAI 2 SENSOR) [facility] 1 each See Admin Instructions Change every 14 days. 7 each 3     insulin aspart (NOVOLOG PEN) 100 UNIT/ML pen Inject 5-10 Units Subcutaneous 4 times daily (with meals and nightly) 1unit : 8 g carb before meals.  Also add 1 unit : 50 mg/dl >180 before meals and at bedtime. 45 mL 3    insulin degludec (TRESIBA FLEXTOUCH) 100 UNIT/ML pen Inject 15 units subcutaneous once daily 45 mL 3    K-Phos-Neutral 155-852-130 MG tablet Take 1 tablet by mouth 4 times daily 120 tablet 1    ketoconazole (NIZORAL) 2 % external shampoo Apply thin layer topically to scalp in shower (leave on 5 min prior to rinse); may also use as a body wash 120 mL 11    lamiVUDine (EPIVIR) 100 MG tablet Take 1 tablet (100 mg) by mouth daily 90 tablet 3    lisinopril (ZESTRIL) 5 MG tablet Take 1 tablet (5 mg) by mouth daily 90 tablet 3    loperamide (IMODIUM A-D) 2 MG tablet Take 1-2 tablets (2-4 mg) by mouth 4 times daily as needed for diarrhea 120 tablet 3    metoprolol succinate ER (TOPROL XL) 25 MG 24 hr tablet Take 1 tablet (25 mg) by mouth daily 45 tablet 1    Multiple Vitamin (TAB-A-GLADIS) TABS TAKE ONE TABLET BY MOUTH ONCE DAILY 90 tablet 0    mycophenolate (GENERIC EQUIVALENT) 250 MG capsule Take 2 capsules (500 mg) by mouth every 12 hours 120 capsule 11    NIFEdipine ER OSMOTIC (PROCARDIA XL) 60 MG 24 hr tablet Take 1 tablet (60 mg) by mouth 2 times daily 60 tablet 11    ondansetron (ZOFRAN ODT) 8 MG ODT tab Take 1 tablet (8 mg) by mouth every 8 hours as needed for nausea 30 tablet 3    pantoprazole (PROTONIX) 40 MG EC tablet Take 1 tablet (40 mg) by mouth daily before breakfast 90 tablet 3    predniSONE (DELTASONE) 5 MG tablet Take 1 tablet (5 mg) by mouth daily 90 tablet 3    rosuvastatin (CRESTOR) 20 MG tablet Take 1 tablet (20 mg) by mouth daily 90 tablet 3    SORAfenib (NEXAVAR) 200 MG tablet Take 200 mg in AM and 400 mg in PM on an empty stomach 1 hour before or 2 hours after a meal. Meet with oncology in 2 weeks to discuss ramp up to 400 mg twice daily 120 tablet 0    tamsulosin (FLOMAX) 0.4 MG  capsule Take 1 capsule (0.4 mg) by mouth daily 90 capsule 3    Vitamin D3 50 mcg (2000 units) tablet Take 1 tablet (50 mcg) by mouth daily 90 tablet 3          Allergies   Allergen Reactions    Codeine Other (See Comments)     Cannot take due to liver  Cannot tolerate oral narcotics    Seasonal Allergies      Sneezing, coughing, runny and itchy eyes         Exam: alert, appears well.  Blood pressure (!) 153/79, pulse 92, temperature 98.5  F (36.9  C), temperature source Oral, resp. rate 16, weight 79.8 kg (175 lb 14.4 oz), SpO2 100%.  Wt Readings from Last 4 Encounters:   10/26/23 78.5 kg (173 lb)   10/20/23 77.1 kg (170 lb)   10/11/23 80.7 kg (178 lb)   10/06/23 78 kg (172 lb)     Oropharynx is moist and without lesion. Neck supple and without adenopathy. Lungs:CTA. Heart: RRR, no murmur or rub. Abdomen: soft, nontender, BS active, no masses or organomegaly.  Extremities: warm, no edema. Speech is clear. CN wnl. Gait/station wnl. Skin: bruising on the left forearm       Labs: I reviewed the following labs.   Latest Reference Range & Units 10/30/23 13:43   Sodium 135 - 145 mmol/L 137   Potassium 3.4 - 5.3 mmol/L 4.6   Chloride 98 - 107 mmol/L 105   Carbon Dioxide (CO2) 22 - 29 mmol/L 23   Urea Nitrogen 8.0 - 23.0 mg/dL 17.5   Creatinine 0.67 - 1.17 mg/dL 1.30 (H)   GFR Estimate >60 mL/min/1.73m2 63   Calcium 8.6 - 10.0 mg/dL 7.2 (L)   Anion Gap 7 - 15 mmol/L 9   Magnesium 1.7 - 2.3 mg/dL 1.3 (L)   Phosphorus 2.5 - 4.5 mg/dL 2.3 (L)   Albumin 3.5 - 5.2 g/dL 3.6   Protein Total 6.4 - 8.3 g/dL 6.0 (L)   Alkaline Phosphatase 40 - 129 U/L 94   ALT 0 - 70 U/L 28   AST 0 - 45 U/L 36   Bilirubin Total <=1.2 mg/dL 0.3   Glucose 70 - 99 mg/dL 140 (H)   WBC 4.0 - 11.0 10e3/uL 4.8   Hemoglobin 13.3 - 17.7 g/dL 9.2 (L)   Hematocrit 40.0 - 53.0 % 28.9 (L)   Platelet Count 150 - 450 10e3/uL 116 (L)   RBC Count 4.40 - 5.90 10e6/uL 2.92 (L)   MCV 78 - 100 fL 99   MCH 26.5 - 33.0 pg 31.5   MCHC 31.5 - 36.5 g/dL 31.8   RDW 10.0 - 15.0  % 14.9   % Neutrophils % 78   % Lymphocytes % 14   % Monocytes % 5   % Eosinophils % 2   % Basophils % 0   Absolute Basophils 0.0 - 0.2 10e3/uL 0.0   Absolute Eosinophils 0.0 - 0.7 10e3/uL 0.1   Absolute Immature Granulocytes <=0.4 10e3/uL 0.1   Absolute Lymphocytes 0.8 - 5.3 10e3/uL 0.7 (L)   Absolute Monocytes 0.0 - 1.3 10e3/uL 0.2   % Immature Granulocytes % 1   Absolute Neutrophils 1.6 - 8.3 10e3/uL 3.8   Absolute NRBCs 10e3/uL 0.0   NRBCs per 100 WBC <1 /100 0   (H): Data is abnormally high  (L): Data is abnormally low        Impression/plan:     Recurrent HCC  -started sorafenib 400 mg BID on 7/29.  Tolerated the first several days well, except for some diarrhea. Then developed acute N/V 4 days prior to admission for renal failure and hyperkalemia.   -he had an extended break off of therapy as he was traveling over the labor day weekend. He resumed 200 mg daily on 9/8.   -dose was slowly increased to 400 mg bid. He was on this dose for about a week or so and had similar toxicities with increased diarrhea and muscle cramping/pain. He has decreased to 200 mg/400 mg dosing and tolerating that better.  -I agree, he should remain at the 200 mg/400 mg dosing and not try to escalate again  -I will see him in 2 weeks to follow-up on toxicities and lab monitoring  -he will see Dr. Villarreal in 1 month with scans to assess his response    Hypophos--s/t sorafenib, common side effect, improving with phos replacement, will continue QID dosing    Hypomag--s/t diarrhea  -diarrhea now resolved. Will give 3 days of mag gluconate and recheck in 2 weeks    L peroneal DVT, dx 10/11/23  -on Eliquis, will remain on indefinitely with his malignancy  -less bruising and bleeding since stopping concomitant ASA    Thrombocytopenia, s/t sorafenib, no evidence of HIT on VAHID while hospitalized  -slow trend down, but stable to continue sorafenib and anticoagulation. Will recheck in 2 weeks     Hx of liver transplant  -immunosuppression with  MMF, prednisone as per Dr. Banuelos     CAD s/p 2 vessel CABG 2021  HTN  CKD  -following with  and Dr. Bhatt  -on metoprolol, recently started on nifedipine 60 mg bid  -BP mildly elevated, continues to monitor      RSV  Clinically stable, still has a cough, but not worsening  Continues to use albuterol prn    -should proceed with COVID vaccination when he is recovered from RSV

## 2023-10-31 ENCOUNTER — OFFICE VISIT (OUTPATIENT)
Dept: FAMILY MEDICINE | Facility: CLINIC | Age: 58
End: 2023-10-31
Payer: MEDICARE

## 2023-10-31 VITALS
SYSTOLIC BLOOD PRESSURE: 118 MMHG | OXYGEN SATURATION: 100 % | HEART RATE: 80 BPM | HEIGHT: 69 IN | WEIGHT: 175.1 LBS | BODY MASS INDEX: 25.93 KG/M2 | DIASTOLIC BLOOD PRESSURE: 67 MMHG

## 2023-10-31 DIAGNOSIS — J20.5 ACUTE BRONCHITIS DUE TO RESPIRATORY SYNCYTIAL VIRUS (RSV): ICD-10-CM

## 2023-10-31 PROCEDURE — 90480 ADMN SARSCOV2 VAC 1/ONLY CMP: CPT | Performed by: FAMILY MEDICINE

## 2023-10-31 PROCEDURE — 99213 OFFICE O/P EST LOW 20 MIN: CPT | Mod: 25 | Performed by: FAMILY MEDICINE

## 2023-10-31 PROCEDURE — 91320 SARSCV2 VAC 30MCG TRS-SUC IM: CPT | Performed by: FAMILY MEDICINE

## 2023-10-31 RX ORDER — ALBUTEROL SULFATE 90 UG/1
2 AEROSOL, METERED RESPIRATORY (INHALATION) EVERY 4 HOURS PRN
Qty: 18 G | Refills: 1 | Status: SHIPPED | OUTPATIENT
Start: 2023-10-31 | End: 2023-12-08

## 2023-10-31 ASSESSMENT — PAIN SCALES - GENERAL: PAINLEVEL: MODERATE PAIN (4)

## 2023-10-31 NOTE — PROGRESS NOTES
Miller is a 58 year old that presents in clinic today for the following:     Chief Complaint   Patient presents with    Follow Up     Pt here for follow up on RSV  Pt requesting refill inhaler           10/31/2023     8:18 AM   Additional Questions   Roomed by Ruth Chaudhry       Screenings from encounters over the past 10 days    No data recorded       Panchito Chaudhry at 8:23 AM on 10/31/2023

## 2023-10-31 NOTE — PROGRESS NOTES
"Assessment & Plan     (J20.5) Acute bronchitis due to respiratory syncytial virus (RSV)  Comment: improving  Plan: albuterol (PROAIR HFA/PROVENTIL HFA/VENTOLIN         HFA) 108 (90 Base) MCG/ACT inhaler          (Z23) Need for prophylactic vaccination against Covid  Comment: Due,   Plan: will complete today in the clinic      BMI:   Estimated body mass index is 25.86 kg/m  as calculated from the following:    Height as of this encounter: 1.753 m (5' 9\").    Weight as of this encounter: 79.4 kg (175 lb 1.6 oz).       No follow-ups on file.    Lamin Ortiz MD  [facility] JOSE Bhatt is a 58 year old, presenting for the following health issues:  Follow Up (Pt here for follow up on RSV/Pt requesting refill inhaler)      10/31/2023     8:18 AM   Additional Questions   Roomed by Ruth Chaudhry       HPI     RSV- Recent RSV - improved, continues to have mild dry cough during the day and at night. Spits out some clear phlegm only at night. Denies any fever, chills at times just at night, no fever. Denies SOB will continue with albuterol inhaler.  ARIELA- Recently done sleep study- treatment indicated medically, will work on getting the machine  Vaccines- Covid in the clinic today. Completed flu at the drugstore.  Reviewed notes from oncology.  Past Medical History:   Diagnosis Date    Anemia 2013    Arthritis     BPH (benign prostatic hyperplasia)     CAD (coronary artery disease) 04/01/2019    Cholelithiasis     Conductive hearing loss 08/16/2017    Depressive disorder 1986    Suffer effects throughout life    Gastroesophageal reflux disease 12/01/2014    HCC (hepatocellular carcinoma) (H) 01/22/2019    History of blood transfusion 2019    Had blood transfussion until Dec 2022    History of diabetic retinopathy 07/2018    HTN (hypertension) 11/20/2019    Hyperlipidemia     Liver cirrhosis secondary to ESTRADA (H)     Liver transplanted (H) 11/11/2019    Portal vein thrombosis " 04/11/2019    Type II diabetes mellitus (H)      Past Surgical History:   Procedure Laterality Date    ABDOMEN SURGERY  11/11/2019    Had Liver Transplant    BIOPSY  12/2022    Had biopsy done will have additional procedure 2/15/2023    BYPASS GRAFT ARTERY CORONARY N/A 07/14/2021    Procedure: median sternotomy, on cardiopulmonary bypass, CORONARY ARTERY BYPASS GRAFT (CABG) x2 with left greater saphenous vein endoscopic harvest and left internal mammery artery harvest;  Surgeon: Tom Zapata MD;  Location: [facility] OR    CARDIAC SURGERY  7/2021    Heart Graph. and bypass surgery    CHOLECYSTECTOMY  11/11/2022    Removed with Liver Transplant    COLONOSCOPY      2015    COLONOSCOPY N/A 12/06/2019    Procedure: COLONOSCOPY, WITH POLYPECTOMY AND BIOPSY;  Surgeon: Adam Morton MD;  Location:  GI    CV CENTRAL VENOUS CATHETER PLACEMENT N/A 07/12/2021    Procedure: Central Venous Catheter Placement;  Surgeon: Fermin Polanco MD;  Location: [facility] CARDIAC CATH LAB    CV CORONARY ANGIOGRAM N/A 07/12/2021    Procedure: Coronary Angiogram;  Surgeon: Fermin Polanco MD;  Location: [facility] CARDIAC CATH LAB    CV HEART CATHETERIZATION WITH POSSIBLE INTERVENTION N/A 02/26/2019    Procedure: CORS;  Surgeon: Jagdish Hoyt MD;  Location:  HEART CARDIAC CATH LAB    CV INTRA AORTIC BALLOON N/A 07/12/2021    Procedure: Intra Aortic Balloon Pump Insertion;  Surgeon: Fermin Polanco MD;  Location: [facility] CARDIAC CATH LAB    ESOPHAGOSCOPY, GASTROSCOPY, DUODENOSCOPY (EGD), COMBINED N/A 11/17/2016    Procedure: COMBINED ESOPHAGOSCOPY, GASTROSCOPY, DUODENOSCOPY (EGD);  Surgeon: Santi Rosas MD;  Location:  GI    ESOPHAGOSCOPY, GASTROSCOPY, DUODENOSCOPY (EGD), COMBINED N/A 11/17/2017    Procedure: COMBINED ESOPHAGOSCOPY, GASTROSCOPY, DUODENOSCOPY (EGD);  EGD;  Surgeon: Santi Rosas MD;  Location:  GI    ESOPHAGOSCOPY, GASTROSCOPY, DUODENOSCOPY  (EGD), COMBINED N/A 12/28/2018    Procedure: EGD;  Surgeon: Santi Rosas MD;  [facility]: [facility] OR    ESOPHAGOSCOPY, GASTROSCOPY, DUODENOSCOPY (EGD), COMBINED N/A 12/06/2019    Procedure: ESOPHAGOGASTRODUODENOSCOPY, WITH BIOPSY;  Surgeon: Adam Morton MD;  Location: [facility] GI    ESOPHAGOSCOPY, GASTROSCOPY, DUODENOSCOPY (EGD), COMBINED N/A 02/13/2020    Procedure: ESOPHAGOGASTRODUODENOSCOPY (EGD);  Surgeon: Santi Rosas MD;  Location:  GI    EXCISE MASS ABDOMEN N/A 07/13/2023    Procedure: Laparoscopic lysis of adhesions, laparoscopic resection of abdominal mass;  Surgeon: Ruiz Chu MD;  Location: [facility] OR    HEAD & NECK SURGERY      12/2017 at [facility].     IMPLANT GOLD WEIGHT EYELID Right 11/16/2017    Procedure: IMPLANT WEIGHT EYELID;  Right Upper Eyelid Weight, right tarsal strip lower eyelid;  Surgeon: Milana Malave MD;  Location: [facility] OR    IR CHEMO EMBOLIZATION  01/22/2019    KNEE SURGERY Left     ORTHOPEDIC SURGERY      PAROTIDECTOMY, RADICAL NECK DISSECTION Right 11/02/2017    Procedure: PAROTIDECTOMY, RADICAL NECK DISSECTION;  Right Superfacial Parotidectomy , Facial nerve repair. with [facility] facial nerve monitor.;  Surgeon: Asiya Morgan MD;  Location: [facility] OR    PHACOEMULSIFICATION CLEAR CORNEA WITH STANDARD INTRAOCULAR LENS IMPLANT Right 01/24/2023    Procedure: PHACOEMULSIFICATION, COMPLEX CATARACT, WITH INTRAOCULAR LENS IMPLANT WITH TRYPAN RIGHT EYE;  Surgeon: Enriqueta Mratin MD;  Location: [facility] OR    PHACOEMULSIFICATION WITH STANDARD INTRAOCULAR LENS IMPLANT Left 01/03/2023    Procedure: PHACOEMULSIFICATION, COMPLEX CATARACT, WITH STANDARD INTRAOCULAR LENS IMPLANT INSERTION LEFT EYE;  Surgeon: Enriqueta Martin MD;  Location: [facility] OR    PICC INSERTION Left 11/06/2017    4fr SL BioFlo PICC, 44cm in the L basilic vein w/ tip in the low SVC    RETURN LIVER TRANSPLANT N/A 11/12/2019    Procedure: Exploratory laparotomy, hematoma evacuation, abdominal washout;   Surgeon: Александр Ramos MD;  Location: [facility] OR    TRANSPLANT LIVER RECIPIENT  DONOR N/A 2019    Procedure: TRANSPLANT, LIVER, RECIPIENT,  DONOR;  Surgeon: Александр Ramos MD;  Location: [facility] OR    VASCULAR SURGERY       Current Outpatient Medications   Medication    albuterol (PROAIR HFA/PROVENTIL HFA/VENTOLIN HFA) 108 (90 Base) MCG/ACT inhaler    Alcohol Swabs (ALCOHOL PREP) 70 % PADS    ammonium lactate (AMLACTIN) 12 % external cream    [START ON 2023] apixaban ANTICOAGULANT (ELIQUIS) 5 MG tablet    BD VIKTORIA U/F 32G X 4 MM insulin pen needle    benzoyl peroxide 5 % external liquid    blood glucose (NO BRAND SPECIFIED) lancets standard    Continuous Blood Gluc Sensor (FREESTYLE CLAUDIA 2 SENSOR) MISC    insulin aspart (NOVOLOG PEN) 100 UNIT/ML pen    insulin degludec (TRESIBA FLEXTOUCH) 100 UNIT/ML pen    K-Phos-Neutral 155-852-130 MG tablet    ketoconazole (NIZORAL) 2 % external shampoo    lamiVUDine (EPIVIR) 100 MG tablet    lisinopril (ZESTRIL) 5 MG tablet    loperamide (IMODIUM A-D) 2 MG tablet    magnesium oxide (MAG-OX) 400 MG tablet    metoprolol succinate ER (TOPROL XL) 25 MG 24 hr tablet    Multiple Vitamin (TAB-A-GLADSI) TABS    mycophenolate (GENERIC EQUIVALENT) 250 MG capsule    NIFEdipine ER OSMOTIC (PROCARDIA XL) 60 MG 24 hr tablet    ondansetron (ZOFRAN ODT) 8 MG ODT tab    pantoprazole (PROTONIX) 40 MG EC tablet    predniSONE (DELTASONE) 5 MG tablet    rosuvastatin (CRESTOR) 20 MG tablet    SORAfenib (NEXAVAR) 200 MG tablet    tamsulosin (FLOMAX) 0.4 MG capsule    Vitamin D3 50 mcg (2000 units) tablet     Current Facility-Administered Medications   Medication    aflibercept (EYLEA) injection prefilled syringe 2 mg    aflibercept (EYLEA) injection prefilled syringe 2 mg    dexamethasone (OZURDEX) intravitreal implant 0.7 mg    dexamethasone (OZURDEX) intravitreal implant 0.7 mg    faricimab-svoa (VABYSMO) intravitreal injection 6 mg    faricimab-svoa (VABYSMO) intravitreal  "injection 6 mg    lidocaine (PF) (XYLOCAINE) injection 1 mL     Allergies   Allergen Reactions    Codeine Other (See Comments)     Cannot take due to liver  Cannot tolerate oral narcotics    Seasonal Allergies      Sneezing, coughing, runny and itchy eyes         Review of Systems   CONSTITUTIONAL: NEGATIVE for fever, chills, change in weight  ENT/MOUTH: NEGATIVE for ear, mouth and throat problems  RESP: NEGATIVE for significant cough or SOB  RESP:POSITIVE for- mild cough-non productive, and mild wheezing at night  CV: NEGATIVE for chest pain, palpitations or peripheral edema  PSYCHIATRIC: NEGATIVE for changes in mood or affect and fatigue. Hx of ARIELA, working on getting the machine  ROS otherwise negative      Objective    /67 (BP Location: Right arm, Patient Position: Sitting, Cuff Size: Adult Regular)   Pulse 80   Ht 1.753 m (5' 9\")   Wt 79.4 kg (175 lb 1.6 oz)   SpO2 100%   BMI 25.86 kg/m    Body mass index is 25.86 kg/m .  Physical Exam   GENERAL: healthy, alert and no distress  MS: no gross musculoskeletal defects noted, no edema  PSYCH: mentation appears normal, affect normal/bright    Kimberly Soria RN, FNP student. Above plan and assessment discussed with the preceptor.      I saw the patient with the student I agree with her note the history physical assessment and plan are mine Lamin Ortiz MD          "

## 2023-11-01 DIAGNOSIS — C78.6 MALIGNANT NEOPLASM METASTATIC TO PERITONEUM (H): ICD-10-CM

## 2023-11-01 DIAGNOSIS — C22.0 HCC (HEPATOCELLULAR CARCINOMA) (H): Primary | ICD-10-CM

## 2023-11-01 RX ORDER — SORAFENIB 200 MG/1
TABLET, FILM COATED ORAL
Qty: 90 TABLET | Refills: 0 | Status: SHIPPED | OUTPATIENT
Start: 2023-11-01 | End: 2023-12-27

## 2023-11-02 ENCOUNTER — OFFICE VISIT (OUTPATIENT)
Dept: DERMATOLOGY | Facility: CLINIC | Age: 58
End: 2023-11-02
Payer: MEDICARE

## 2023-11-02 ENCOUNTER — LAB (OUTPATIENT)
Dept: LAB | Facility: CLINIC | Age: 58
End: 2023-11-02
Payer: MEDICARE

## 2023-11-02 DIAGNOSIS — E83.42 HYPOMAGNESEMIA: ICD-10-CM

## 2023-11-02 DIAGNOSIS — D49.2 NEOPLASM OF UNSPECIFIED BEHAVIOR OF BONE, SOFT TISSUE, AND SKIN: ICD-10-CM

## 2023-11-02 DIAGNOSIS — C22.0 HCC (HEPATOCELLULAR CARCINOMA) (H): ICD-10-CM

## 2023-11-02 DIAGNOSIS — C78.6 MALIGNANT NEOPLASM METASTATIC TO PERITONEUM (H): ICD-10-CM

## 2023-11-02 DIAGNOSIS — Z85.828 HISTORY OF NONMELANOMA SKIN CANCER: Primary | ICD-10-CM

## 2023-11-02 LAB
ALBUMIN SERPL BCG-MCNC: 3.9 G/DL (ref 3.5–5.2)
ALP SERPL-CCNC: 100 U/L (ref 40–129)
ALT SERPL W P-5'-P-CCNC: 29 U/L (ref 0–70)
ANION GAP SERPL CALCULATED.3IONS-SCNC: 11 MMOL/L (ref 7–15)
AST SERPL W P-5'-P-CCNC: 37 U/L (ref 0–45)
BASOPHILS # BLD AUTO: 0 10E3/UL (ref 0–0.2)
BASOPHILS NFR BLD AUTO: 0 %
BILIRUB SERPL-MCNC: 0.5 MG/DL
BUN SERPL-MCNC: 16.5 MG/DL (ref 8–23)
CALCIUM SERPL-MCNC: 7.2 MG/DL (ref 8.6–10)
CHLORIDE SERPL-SCNC: 106 MMOL/L (ref 98–107)
CREAT SERPL-MCNC: 1.2 MG/DL (ref 0.67–1.17)
DEPRECATED HCO3 PLAS-SCNC: 23 MMOL/L (ref 22–29)
EGFRCR SERPLBLD CKD-EPI 2021: 70 ML/MIN/1.73M2
EOSINOPHIL # BLD AUTO: 0 10E3/UL (ref 0–0.7)
EOSINOPHIL NFR BLD AUTO: 1 %
ERYTHROCYTE [DISTWIDTH] IN BLOOD BY AUTOMATED COUNT: 15.2 % (ref 10–15)
GLUCOSE SERPL-MCNC: 139 MG/DL (ref 70–99)
HCT VFR BLD AUTO: 30.5 % (ref 40–53)
HGB BLD-MCNC: 9.7 G/DL (ref 13.3–17.7)
IMM GRANULOCYTES # BLD: 0 10E3/UL
IMM GRANULOCYTES NFR BLD: 1 %
LYMPHOCYTES # BLD AUTO: 0.5 10E3/UL (ref 0.8–5.3)
LYMPHOCYTES NFR BLD AUTO: 10 %
MAGNESIUM SERPL-MCNC: 1.2 MG/DL (ref 1.7–2.3)
MCH RBC QN AUTO: 31.9 PG (ref 26.5–33)
MCHC RBC AUTO-ENTMCNC: 31.8 G/DL (ref 31.5–36.5)
MCV RBC AUTO: 100 FL (ref 78–100)
MONOCYTES # BLD AUTO: 0.2 10E3/UL (ref 0–1.3)
MONOCYTES NFR BLD AUTO: 4 %
NEUTROPHILS # BLD AUTO: 4.2 10E3/UL (ref 1.6–8.3)
NEUTROPHILS NFR BLD AUTO: 84 %
NRBC # BLD AUTO: 0 10E3/UL
NRBC BLD AUTO-RTO: 0 /100
PHOSPHATE SERPL-MCNC: 2.3 MG/DL (ref 2.5–4.5)
PLATELET # BLD AUTO: 128 10E3/UL (ref 150–450)
POTASSIUM SERPL-SCNC: 5.1 MMOL/L (ref 3.4–5.3)
PROT SERPL-MCNC: 6.3 G/DL (ref 6.4–8.3)
RBC # BLD AUTO: 3.04 10E6/UL (ref 4.4–5.9)
SODIUM SERPL-SCNC: 140 MMOL/L (ref 135–145)
WBC # BLD AUTO: 4.9 10E3/UL (ref 4–11)

## 2023-11-02 PROCEDURE — 84100 ASSAY OF PHOSPHORUS: CPT | Performed by: PATHOLOGY

## 2023-11-02 PROCEDURE — 36415 COLL VENOUS BLD VENIPUNCTURE: CPT | Performed by: PATHOLOGY

## 2023-11-02 PROCEDURE — 99213 OFFICE O/P EST LOW 20 MIN: CPT | Mod: 25 | Performed by: DERMATOLOGY

## 2023-11-02 PROCEDURE — 88305 TISSUE EXAM BY PATHOLOGIST: CPT | Mod: TC | Performed by: DERMATOLOGY

## 2023-11-02 PROCEDURE — 11102 TANGNTL BX SKIN SINGLE LES: CPT | Performed by: DERMATOLOGY

## 2023-11-02 PROCEDURE — 80053 COMPREHEN METABOLIC PANEL: CPT | Performed by: PATHOLOGY

## 2023-11-02 PROCEDURE — 83735 ASSAY OF MAGNESIUM: CPT | Performed by: PATHOLOGY

## 2023-11-02 PROCEDURE — 85025 COMPLETE CBC W/AUTO DIFF WBC: CPT | Performed by: PATHOLOGY

## 2023-11-02 RX ORDER — MAGNESIUM OXIDE 400 MG/1
400 TABLET ORAL DAILY
Qty: 30 TABLET | Refills: 0 | Status: SHIPPED | OUTPATIENT
Start: 2023-11-02 | End: 2023-11-02

## 2023-11-02 RX ORDER — MAGNESIUM OXIDE 400 MG/1
400 TABLET ORAL DAILY
Qty: 30 TABLET | Refills: 0 | Status: SHIPPED | OUTPATIENT
Start: 2023-11-02 | End: 2023-11-20

## 2023-11-02 ASSESSMENT — PAIN SCALES - GENERAL: PAINLEVEL: NO PAIN (0)

## 2023-11-02 NOTE — NURSING NOTE
Dermatology Rooming Note    Frandy Workman's goals for this visit include:   Chief Complaint   Patient presents with    Skin Check     Miller is here today for a skin check      IRMA Mayfield

## 2023-11-02 NOTE — LETTER
11/2/2023       RE: Frandy Workman  [address] [facility] [address]     Dear Colleague,    Thank you for referring your patient, Frandy Workman, to the [facility] DERMATOLOGY [facility] [place] at [facility]. Please see a copy of my visit note below.    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 BPO wash  # Versicolor. Ketoconazole wash  # NUB, x4: right cutaneous upper lip, right medial cheek, left eyebrow, left lateral cheek. DDx: r/o BCC vs seborrheic hyperplasia vs folliculitis. S/p shave 5/4/23  ____________________________________________     Assessment & Plan:      # Hx of NMSC; SCCIS of left frontal scalp. FBSE performed today, NERD.   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  DDx BCC vs irritated folliculitis; nonhealing for 3 months.  - Shave biopsy today; see procedure note below     # Benign nevi. - Discussed reassuring features and benign nature of diagnosis. No further management at this time     # Seborrheic keratoses, non-irritated. Discussed the natural history and benign nature of this lesion. Reassurance provided that no additional treatment is necessary unless lesions become bothersome in the future.     # Hemorrhagic crusted papules of right medial ankle.  Patient ascribes these to sorafenib therapy, made worse by Eliqius.  Not concerning for skin cancer or active inflammatory process such as vasculitis on today's exam, but if persistent at next visit, consider biopsy.     Procedures Performed:    After signed informed consent, the affected area(s) were swabbed with an alcohol pad and injected with 1% lidocaine with 1:100,000 epinephrine.  Biopsy/biopsies via shave technique was performed and sent to pathology for review.  Hemostasis was achieved with AlCl 20%, and petrolatum and bandage were applied.  Patient tolerated procedure without complication.  Appropriate wound care instructions were provided.      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    He was last seen in our general dermatology clinic in May 2023, at which time he had 4 biopsies, all of which were negative for skin cancer.  Today he notes a small nonhealing sore along his left frontal hairline for the past 3 months.  He also has some small sores around the right ankle which she attributes to saracatinib and Eliquis therapy.  Otherwise he denies any other nonhealing sores or new or changing moles.     Physical Exam:  Vitals: There were no vitals taken for this visit.  SKIN: Total skin excluding the undergarment areas was performed. The exam included the head/face, neck, both arms, chest, back, abdomen, both legs, digits and/or nails.   - crusted eroded 3mm papule of left frontal hairline  - Multiple regular brown pigmented macules and papules, as well as waxy stuck on tan to brown papules, are identified on the trunk and extremities.   - There is no erythema, telangectasias, nodularity, or pigmentation on the linear scar located at left temporal scalp.   - erosions with heme crusts right medial ankle

## 2023-11-02 NOTE — PROGRESS NOTES
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 BPO wash  # Versicolor. Ketoconazole wash  # NUB, x4: right cutaneous upper lip, right medial cheek, left eyebrow, left lateral cheek. DDx: r/o BCC vs seborrheic hyperplasia vs folliculitis. S/p shave 5/4/23  ____________________________________________     Assessment & Plan:      # Hx of NMSC; SCCIS of left frontal scalp. FBSE performed today, NERD.   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  DDx BCC vs irritated folliculitis; nonhealing for 3 months.  - Shave biopsy today; see procedure note below     # Benign nevi. - Discussed reassuring features and benign nature of diagnosis. No further management at this time     # Seborrheic keratoses, non-irritated. Discussed the natural history and benign nature of this lesion. Reassurance provided that no additional treatment is necessary unless lesions become bothersome in the future.     # Hemorrhagic crusted papules of right medial ankle.  Patient ascribes these to sorafenib therapy, made worse by Eliqius.  Not concerning for skin cancer or active inflammatory process such as vasculitis on today's exam, but if persistent at next visit, consider biopsy.     Procedures Performed:   After signed informed consent, the affected area(s) were swabbed with an alcohol pad and injected with 1% lidocaine with 1:100,000 epinephrine.  Biopsy/biopsies via shave technique was performed and sent to pathology for review.  Hemostasis was achieved with AlCl 20%, and petrolatum and bandage were applied.   Patient tolerated procedure without complication.  Appropriate wound care instructions were provided.      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    He was last seen in our general dermatology clinic in May 2023, at which time he had 4 biopsies, all of which were negative for skin cancer.  Today he notes a small nonhealing sore along his left frontal hairline for the past 3 months.  He also has some small sores around the right ankle which she attributes to saracatinib and Eliquis therapy.  Otherwise he denies any other nonhealing sores or new or changing moles.     Physical Exam:  Vitals: There were no vitals taken for this visit.  SKIN: Total skin excluding the undergarment areas was performed. The exam included the head/face, neck, both arms, chest, back, abdomen, both legs, digits and/or nails.   - crusted eroded 3mm papule of left frontal hairline  - Multiple regular brown pigmented macules and papules, as well as waxy stuck on tan to brown papules, are identified on the trunk and extremities.   - There is no erythema, telangectasias, nodularity, or pigmentation on the linear scar located at left temporal scalp.   - erosions with heme crusts right medial ankle

## 2023-11-03 ENCOUNTER — TELEPHONE (OUTPATIENT)
Dept: ONCOLOGY | Facility: CLINIC | Age: 58
End: 2023-11-03
Payer: MEDICARE

## 2023-11-03 ENCOUNTER — VIRTUAL VISIT (OUTPATIENT)
Dept: BEHAVIORAL HEALTH | Facility: CLINIC | Age: 58
End: 2023-11-03
Payer: MEDICARE

## 2023-11-03 DIAGNOSIS — F31.31 BIPOLAR 1 DISORDER, DEPRESSED, MILD (H): Primary | ICD-10-CM

## 2023-11-03 DIAGNOSIS — F41.1 GENERALIZED ANXIETY DISORDER: ICD-10-CM

## 2023-11-03 PROCEDURE — 90832 PSYTX W PT 30 MINUTES: CPT | Mod: VID | Performed by: PSYCHOLOGIST

## 2023-11-03 NOTE — TELEPHONE ENCOUNTER
Oral Chemotherapy Monitoring Program    Subjective/Objective:  Frandy Workman is a 58 year old male contacted by phone for a follow-up visit for oral chemotherapy.  Pt confirms taking the appropriate dose of Nexavar, 200mg QAM and [id number] QPM. He reduced back to this dose as of 10/27/23. Denies missed doses, and recent hospital or ED visits. Patient has not had any recent medication changes. Pt states he is still experiencing some back pain, leg pain, and pain in his shins.  He sees a chiropractor for this (Dr. Villarreal aware) and it is helping him.  He is still taking imodium 4mg four times daily, which keep his diarrhea under control.  He hopes to decrease this dose as his body adjusts back to this dose of Nexavar.        8/7/2023     9:00 AM 8/9/2023     7:00 AM 9/22/2023    10:00 AM 10/6/2023     3:00 PM 10/31/2023    10:00 AM 11/1/2023     1:00 PM 11/3/2023     3:00 PM   ORAL CHEMOTHERAPY   Assessment Type Initial Follow up Incoming phone call Chart Review;Other Refill Refill;Chart Review Refill Initial Follow up   Diagnosis Code   Hepatocellular Cancer Hepatocellular Cancer Hepatocellular Cancer Hepatocellular Cancer Hepatocellular Cancer   Providers Dr Cherelle Villarreal   [facility]   Is this patient followed by the [facility] OC team? No No No No No No No   Drug Name Nexavar (sorafenib) Nexavar (sorafenib) Nexavar (sorafenib) Nexavar (sorafenib) Nexavar (sorafenib) Nexavar (sorafenib) Nexavar (sorafenib)   Dose 400 mg 400 mg 200 mg       Current Schedule BID BID BID BID BID BID BID   Cycle Details Continuous Continuous Continuous Continuous Continuous Continuous Continuous   Start Date of Last Cycle 7/29/2023 9/21/2023       Doses missed in last 2 weeks 0      0   Adherence Assessment Adherent      Adherent   Adverse Effects Other (See Note for Details);Decreased Appetite/Weight Loss       "Diarrhea   Diarrhea       Grade 2   Pharmacist Intervention(diarrhea)       No   Decrease Appetite/Weight Loss Grade 1         Pharmacist Intervention(decreased appetite/weight loss) No         Other (See Note for Details) Muscle cramps in shin at bedtime         Pharmacist intervention(other) No         Home BPs all BPs<140/90         Any new drug interactions? No      No   Is the dose as ordered appropriate for the patient?       Yes   Since the last time we talked, have you been hospitalized or used the emergency room?       No       Last PHQ-2 Score on record:       10/2/2023     1:00 PM 7/24/2023    11:09 AM   PHQ-2 ( 1999 [employer])   Q1: Little interest or pleasure in doing things 1 0   Q2: Feeling down, depressed or hopeless 0 0   PHQ-2 Score 1 0   PHQ-2 Total Score (12-17 Years)- Positive if 3 or more points; Administer PHQ-A if positive 1 0       Vitals:  BP:   BP Readings from Last 1 Encounters:   10/31/23 118/67     Wt Readings from Last 1 Encounters:   10/31/23 79.4 kg (175 lb 1.6 oz)     Estimated body surface area is 1.97 meters squared as calculated from the following:    Height as of 10/31/23: 1.753 m (5' 9\").    Weight as of 10/31/23: 79.4 kg (175 lb 1.6 oz).    Labs:  _  Result Component Current Result Ref Range   Sodium 140 (11/2/2023) 135 - 145 mmol/L     _  Result Component Current Result Ref Range   Potassium 5.1 (11/2/2023) 3.4 - 5.3 mmol/L     _  Result Component Current Result Ref Range   Calcium 7.2 (L) (11/2/2023) 8.6 - 10.0 mg/dL     _  Result Component Current Result Ref Range   Magnesium 1.2 (L) (11/2/2023) 1.7 - 2.3 mg/dL     _  Result Component Current Result Ref Range   Phosphorus 2.3 (L) (11/2/2023) 2.5 - 4.5 mg/dL     _  Result Component Current Result Ref Range   Albumin 3.9 (11/2/2023) 3.5 - 5.2 g/dL     _  Result Component Current Result Ref Range   Urea Nitrogen 16.5 (11/2/2023) 8.0 - 23.0 mg/dL     _  Result Component Current Result Ref Range   Creatinine 1.20 (H) (11/2/2023) " 0.67 - 1.17 mg/dL     _  Result Component Current Result Ref Range   AST 37 (11/2/2023) 0 - 45 U/L     _  Result Component Current Result Ref Range   ALT 29 (11/2/2023) 0 - 70 U/L     _  Result Component Current Result Ref Range   Bilirubin Total 0.5 (11/2/2023) <=1.2 mg/dL     _  Result Component Current Result Ref Range   WBC Count 4.9 (11/2/2023) 4.0 - 11.0 10e3/uL     _  Result Component Current Result Ref Range   Hemoglobin 9.7 (L) (11/2/2023) 13.3 - 17.7 g/dL     _  Result Component Current Result Ref Range   Platelet Count 128 (L) (11/2/2023) 150 - 450 10e3/uL     No results found for ANC within last 30 days.     _  Result Component Current Result Ref Range   Absolute Neutrophils 4.2 (11/2/2023) 1.6 - 8.3 10e3/uL          Assessment/Plan:  Pt is tolerating this dose of Nexavar reasonably well, and better than the 400mg BID dose.  He will try to wean off the imodium as his body adjusts to this dose (200mg QAM, 400mg QPM).  Pt to continue to see a chiropractor for pain.    Follow-Up:  11/14: Review SERA appt and labs    Refill Due:  Pt has 9DS remaining, [facility] will reach out to him to refill Nexavar    Grace Myhre, PharmD  Hematology/Oncology Pharmacist  [facility]  [facility]  [phone]
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
## 2023-11-05 LAB — SLPCOMP: NORMAL

## 2023-11-06 ENCOUNTER — DOCUMENTATION ONLY (OUTPATIENT)
Dept: SLEEP MEDICINE | Facility: CLINIC | Age: 58
End: 2023-11-06

## 2023-11-06 ENCOUNTER — DOCUMENTATION ONLY (OUTPATIENT)
Dept: SLEEP MEDICINE | Facility: CLINIC | Age: 58
End: 2023-11-06
Payer: MEDICARE

## 2023-11-06 DIAGNOSIS — C78.6 MALIGNANT NEOPLASM METASTATIC TO PERITONEUM (H): ICD-10-CM

## 2023-11-06 DIAGNOSIS — G47.33 OSA (OBSTRUCTIVE SLEEP APNEA): Primary | ICD-10-CM

## 2023-11-06 DIAGNOSIS — C22.0 HCC (HEPATOCELLULAR CARCINOMA) (H): Primary | ICD-10-CM

## 2023-11-06 NOTE — PROGRESS NOTES
Patient was offered choice of vendor and chose [facility].  Patient Frandy Workman was set up at [place] on November 6, 2023. Patient received a Resmed Airsense 10 Pressures were set at  5-15 cm H2O.   Patient s ramp is 5 cm H2O for Off and FLEX/EPR is 2.  Patient received a Resmed Mask name: AIRFIT N20  Nasal mask size Small, heated tubing and heated humidifier.  Patient has the following compliance requirements: using and visit requirements  Patient has a follow up on 3/15/2024 with Dr. Simpson.    Cedric Meneses

## 2023-11-09 ENCOUNTER — DOCUMENTATION ONLY (OUTPATIENT)
Dept: SLEEP MEDICINE | Facility: CLINIC | Age: 58
End: 2023-11-09
Payer: MEDICARE

## 2023-11-09 NOTE — PROGRESS NOTES
3 day Sleep therapy management telephone visit    Diagnostic AHI: 29.1  PSG    Confirmed with patient at time of call- Yes Patient is still interested in STM service       Subjective measures:  Spoke with patient.  Patient has RSV so he coughs a lot during the night which may be causing a high AHI.  Patient not sure if he needs a full face mask. Patient will call next week and we will review the data.          Objective data     Order Settings for PAP  CPAP min     CPAP max              Device settings from machine CPAP min 5.0     CPAP max 15.0           EPR Setting TWO    RESMED soft response  OFF     Assessment: Nightly usage most nights under four hours       Action plan: Patient to have 14 day STM visit. Patient has a follow up visit scheduled:   no    Replacement device: No  STM ordered by provider: Yes     Total time spent on accessing and  interpreting remote patient PAP therapy data  10 minutes    Total time spent counseling, coaching  and reviewing PAP therapy data with patient  4 minutes    [id number] no

## 2023-11-10 ENCOUNTER — NURSE TRIAGE (OUTPATIENT)
Dept: ONCOLOGY | Facility: CLINIC | Age: 58
End: 2023-11-10
Payer: MEDICARE

## 2023-11-10 ENCOUNTER — LAB (OUTPATIENT)
Dept: LAB | Facility: CLINIC | Age: 58
End: 2023-11-10
Payer: MEDICARE

## 2023-11-10 DIAGNOSIS — M79.669 CALF PAIN: ICD-10-CM

## 2023-11-10 DIAGNOSIS — M79.89 SWELLING OF LIMB: Primary | ICD-10-CM

## 2023-11-10 DIAGNOSIS — C22.0 HCC (HEPATOCELLULAR CARCINOMA) (H): ICD-10-CM

## 2023-11-10 DIAGNOSIS — Z79.899 ENCOUNTER FOR LONG-TERM (CURRENT) USE OF MEDICATIONS: ICD-10-CM

## 2023-11-10 LAB
ALBUMIN SERPL BCG-MCNC: 3.9 G/DL (ref 3.5–5.2)
ALP SERPL-CCNC: 107 U/L (ref 40–129)
ALT SERPL W P-5'-P-CCNC: 28 U/L (ref 0–70)
ANION GAP SERPL CALCULATED.3IONS-SCNC: 11 MMOL/L (ref 7–15)
AST SERPL W P-5'-P-CCNC: 33 U/L (ref 0–45)
BASOPHILS # BLD AUTO: 0 10E3/UL (ref 0–0.2)
BASOPHILS NFR BLD AUTO: 0 %
BILIRUB SERPL-MCNC: 0.5 MG/DL
BUN SERPL-MCNC: 19.8 MG/DL (ref 8–23)
CALCIUM SERPL-MCNC: 7.6 MG/DL (ref 8.6–10)
CHLORIDE SERPL-SCNC: 106 MMOL/L (ref 98–107)
CREAT SERPL-MCNC: 1.23 MG/DL (ref 0.67–1.17)
DEPRECATED HCO3 PLAS-SCNC: 24 MMOL/L (ref 22–29)
EGFRCR SERPLBLD CKD-EPI 2021: 68 ML/MIN/1.73M2
EOSINOPHIL # BLD AUTO: 0 10E3/UL (ref 0–0.7)
EOSINOPHIL NFR BLD AUTO: 0 %
ERYTHROCYTE [DISTWIDTH] IN BLOOD BY AUTOMATED COUNT: 15.1 % (ref 10–15)
GLUCOSE SERPL-MCNC: 243 MG/DL (ref 70–99)
HCT VFR BLD AUTO: 30.5 % (ref 40–53)
HGB BLD-MCNC: 9.6 G/DL (ref 13.3–17.7)
IMM GRANULOCYTES # BLD: 0 10E3/UL
IMM GRANULOCYTES NFR BLD: 1 %
LYMPHOCYTES # BLD AUTO: 0.4 10E3/UL (ref 0.8–5.3)
LYMPHOCYTES NFR BLD AUTO: 8 %
MAGNESIUM SERPL-MCNC: 1.3 MG/DL (ref 1.7–2.3)
MCH RBC QN AUTO: 31.5 PG (ref 26.5–33)
MCHC RBC AUTO-ENTMCNC: 31.5 G/DL (ref 31.5–36.5)
MCV RBC AUTO: 100 FL (ref 78–100)
MONOCYTES # BLD AUTO: 0.2 10E3/UL (ref 0–1.3)
MONOCYTES NFR BLD AUTO: 3 %
NEUTROPHILS # BLD AUTO: 4.8 10E3/UL (ref 1.6–8.3)
NEUTROPHILS NFR BLD AUTO: 88 %
NRBC # BLD AUTO: 0 10E3/UL
NRBC BLD AUTO-RTO: 0 /100
PHOSPHATE SERPL-MCNC: 2.4 MG/DL (ref 2.5–4.5)
PLATELET # BLD AUTO: 100 10E3/UL (ref 150–450)
POTASSIUM SERPL-SCNC: 4.5 MMOL/L (ref 3.4–5.3)
PROT SERPL-MCNC: 6.5 G/DL (ref 6.4–8.3)
RBC # BLD AUTO: 3.05 10E6/UL (ref 4.4–5.9)
SODIUM SERPL-SCNC: 141 MMOL/L (ref 135–145)
WBC # BLD AUTO: 5.5 10E3/UL (ref 4–11)

## 2023-11-10 PROCEDURE — 36415 COLL VENOUS BLD VENIPUNCTURE: CPT | Performed by: PATHOLOGY

## 2023-11-10 PROCEDURE — 85025 COMPLETE CBC W/AUTO DIFF WBC: CPT | Performed by: PATHOLOGY

## 2023-11-10 PROCEDURE — 84100 ASSAY OF PHOSPHORUS: CPT | Performed by: PATHOLOGY

## 2023-11-10 PROCEDURE — 83735 ASSAY OF MAGNESIUM: CPT | Performed by: PATHOLOGY

## 2023-11-10 PROCEDURE — 80053 COMPREHEN METABOLIC PANEL: CPT | Performed by: PATHOLOGY

## 2023-11-10 NOTE — TELEPHONE ENCOUNTER
"Nurse Triage SBAR    Situation: Nose bleed and feet swelling/pain    Background:    DX: hepatocellular carcinoma  Provider: /Brenda Wilson  Most recent appointment: 10/30/23 doni/Brenda Wilson  Upcoming appointments: 11/14/23 w/Brenda Wilson, 11/28/23 CT scan  Most recent treatment: Sorafenib 200 mg/400 mg dosing       Assessment:   Last night pt had a nose bleed. \"Thick mucous came out.\" This went on for one hour. Resolved around 3-4 this morning. Nose bleed started again when he got up this morning. It is not a steady flow. When pt blows his nose he gets bloody mucous. Bleeding is only from left nostril.    Started using \"sleep apnea machine\" on Monday.      Pt also stating he is having pain in his feet. This started this week on Tuesday.  Located in balls of feet and heels. Rates 8/10 and describes it as sharp. Is not taking anything for pain. Pt states he does not like to take pain medication. Worse when walking on hard wood floors but okay if he is on carpet.    His feet are also having some swelling bilaterally. Feet to Shins. He first noticed swelling this morning. Right side is worse. Pt states he did not take Eliquis this morning because medication is suppose to be delivered this afternoon. Does have pain when he flexes feet. States he has little \"stabs\" on his legs. He gets these with his diabetes.    L peroneal DVT, dx 10/11/23     Denies chest pain, SOB, lightheadedness    Recommendation:   Paged provider:  [phone]  responding through secure chat to recommend get his blood counts and renal function checked--CBC, CMP. If more than a minor nose bleeds have him see one of the APPs.  agreeing to have pt complete US doppler of right extremity to rule out new DVT.    1210 Call to pt to inform him of provider recommendation. Informed pt someone from scheduling will be calling him to schedule lab appt and US. Pt verbalized understanding and stated he is able to come in today to " complete these orders.

## 2023-11-10 NOTE — TELEPHONE ENCOUNTER
Contacted pt to inform him Dr.Greeno bravo with keeping US for 11/13/23. Reviewed with pt sx to look out for and when to report to ED. Pt verbalized understanding.

## 2023-11-13 ENCOUNTER — OFFICE VISIT (OUTPATIENT)
Dept: ORTHOPEDICS | Facility: CLINIC | Age: 58
End: 2023-11-13
Payer: MEDICARE

## 2023-11-13 ENCOUNTER — ANCILLARY PROCEDURE (OUTPATIENT)
Dept: ULTRASOUND IMAGING | Facility: CLINIC | Age: 58
End: 2023-11-13
Attending: INTERNAL MEDICINE
Payer: MEDICARE

## 2023-11-13 DIAGNOSIS — C22.0 HCC (HEPATOCELLULAR CARCINOMA) (H): ICD-10-CM

## 2023-11-13 DIAGNOSIS — L08.9: ICD-10-CM

## 2023-11-13 DIAGNOSIS — E11.49 TYPE II OR UNSPECIFIED TYPE DIABETES MELLITUS WITH NEUROLOGICAL MANIFESTATIONS, NOT STATED AS UNCONTROLLED(250.60) (H): Primary | ICD-10-CM

## 2023-11-13 DIAGNOSIS — S90.821S BLISTER OF RIGHT FOOT, SEQUELA: ICD-10-CM

## 2023-11-13 DIAGNOSIS — M79.89 SWELLING OF LIMB: ICD-10-CM

## 2023-11-13 DIAGNOSIS — E11.51 DIABETES MELLITUS WITH PERIPHERAL VASCULAR DISEASE (H): ICD-10-CM

## 2023-11-13 DIAGNOSIS — M20.42 HAMMER TOES OF BOTH FEET: ICD-10-CM

## 2023-11-13 DIAGNOSIS — M79.669 CALF PAIN: ICD-10-CM

## 2023-11-13 DIAGNOSIS — M20.41 HAMMER TOES OF BOTH FEET: ICD-10-CM

## 2023-11-13 DIAGNOSIS — S90.822S: ICD-10-CM

## 2023-11-13 PROCEDURE — 99214 OFFICE O/P EST MOD 30 MIN: CPT | Performed by: PODIATRIST

## 2023-11-13 PROCEDURE — 93971 EXTREMITY STUDY: CPT | Mod: RT | Performed by: RADIOLOGY

## 2023-11-13 NOTE — LETTER
11/13/2023         RE: Frandy Workman  [address] [facility] [address]        Dear Colleague,    Thank you for referring your patient, Frandy Workman, to the [facility] [place]. Please see a copy of my visit note below.    Past Medical History:   Diagnosis Date    Anemia 2013    Arthritis     BPH (benign prostatic hyperplasia)     CAD (coronary artery disease) 04/01/2019    Cholelithiasis     Conductive hearing loss 08/16/2017    Depressive disorder 1986    Suffer effects throughout life    Gastroesophageal reflux disease 12/01/2014    HCC (hepatocellular carcinoma) (H) 01/22/2019    History of blood transfusion 2019    Had blood transfussion until Dec 2022    History of diabetic retinopathy 07/2018    HTN (hypertension) 11/20/2019    Hyperlipidemia     Liver cirrhosis secondary to ESTRADA (H)     Liver transplanted (H) 11/11/2019    Portal vein thrombosis 04/11/2019    Type II diabetes mellitus (H)      Patient Active Problem List   Diagnosis    Bipolar affective disorder in remission (H24)    Esophageal varices determined by endoscopy (H)    Erectile dysfunction due to diseases classified elsewhere    HCC (hepatocellular carcinoma) (H)    Equivocal stress echocardiogram    Type 2 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, unspecified whether long term insulin use (H)    Status post coronary angiogram    CAD (coronary artery disease)    Liver transplant recipient (H)    Status post liver transplantation (H)    Immunosuppressed status (H24)    Benign essential hypertension    Chronic kidney disease, stage 3a (H)    Anemia of chronic renal failure, stage 3a (H)    History of coronary artery disease    Dyslipidemia    Excessive sweating    Stage 3b chronic kidney disease (H)    Anemia of chronic renal failure, stage 3b (H)    NSTEMI (non-ST elevated myocardial infarction) (H)    Chest pain, unspecified type    Hypertensive chronic  kidney disease with stage 5 chronic kidney disease or end stage renal disease (H)    Vitreous hemorrhage of left eye (H)    Proliferative diabetic retinopathy of both eyes associated with type 2 diabetes mellitus, unspecified proliferative retinopathy type (H)    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)    Liver replaced by transplant (H)    Hyperkalemia    Acute renal failure, unspecified acute renal failure type (H24)    ARIELA (obstructive sleep apnea)     Past Surgical History:   Procedure Laterality Date    ABDOMEN SURGERY  11/11/2019    Had Liver Transplant    BIOPSY  12/2022    Had biopsy done will have additional procedure 2/15/2023    BYPASS GRAFT ARTERY CORONARY N/A 07/14/2021    Procedure: median sternotomy, on cardiopulmonary bypass, CORONARY ARTERY BYPASS GRAFT (CABG) x2 with left greater saphenous vein endoscopic harvest and left internal mammery artery harvest;  Surgeon: Tom Zapata MD;  [facility]    CARDIAC SURGERY  7/2021    Heart Graph. and bypass surgery    CHOLECYSTECTOMY  11/11/2022    Removed with Liver Transplant    COLONOSCOPY      2015    COLONOSCOPY N/A 12/06/2019    Procedure: COLONOSCOPY, WITH POLYPECTOMY AND BIOPSY;  Surgeon: Adam Morton MD;  Location:  GI    CV CENTRAL VENOUS CATHETER PLACEMENT N/A 07/12/2021    Procedure: Central Venous Catheter Placement;  Surgeon: Fermin Polanco MD;  Location: [facility] CARDIAC CATH LAB    CV CORONARY ANGIOGRAM N/A 07/12/2021    Procedure: Coronary Angiogram;  Surgeon: Ferimn Polanco MD;  Location: [facility] CARDIAC CATH LAB    CV HEART CATHETERIZATION WITH POSSIBLE INTERVENTION N/A 02/26/2019    Procedure: CORS;  Surgeon: Jagdish Hoyt MD;  Location: [facility] CARDIAC CATH LAB    CV INTRA AORTIC BALLOON N/A 07/12/2021    Procedure: Intra Aortic Balloon Pump Insertion;  Surgeon: Fermin Polanco MD;  Location: [facility] CARDIAC CATH LAB    ESOPHAGOSCOPY, GASTROSCOPY,  DUODENOSCOPY (EGD), COMBINED N/A 11/17/2016    Procedure: COMBINED ESOPHAGOSCOPY, GASTROSCOPY, DUODENOSCOPY (EGD);  Surgeon: Santi Rosas MD;  [facility]    ESOPHAGOSCOPY, GASTROSCOPY, DUODENOSCOPY (EGD), COMBINED N/A 11/17/2017    Procedure: COMBINED ESOPHAGOSCOPY, GASTROSCOPY, DUODENOSCOPY (EGD);  EGD;  Surgeon: Santi Rosas MD;  [facility]: [facility]    ESOPHAGOSCOPY, GASTROSCOPY, DUODENOSCOPY (EGD), COMBINED N/A 12/28/2018    Procedure: EGD;  Surgeon: Santi Rosas MD;  Location:  OR    ESOPHAGOSCOPY, GASTROSCOPY, DUODENOSCOPY (EGD), COMBINED N/A 12/06/2019    Procedure: ESOPHAGOGASTRODUODENOSCOPY, WITH BIOPSY;  Surgeon: Adam Morton MD;  [facility]    ESOPHAGOSCOPY, GASTROSCOPY, DUODENOSCOPY (EGD), COMBINED N/A 02/13/2020    Procedure: ESOPHAGOGASTRODUODENOSCOPY (EGD);  Surgeon: Santi Rosas MD;  Location:  GI    EXCISE MASS ABDOMEN N/A 07/13/2023    Procedure: Laparoscopic lysis of adhesions, laparoscopic resection of abdominal mass;  Surgeon: Ruiz Chu MD;  [facility]    HEAD & NECK SURGERY      12/2017 at [facility].     IMPLANT GOLD WEIGHT EYELID Right 11/16/2017    Procedure: IMPLANT WEIGHT EYELID;  Right Upper Eyelid Weight, right tarsal strip lower eyelid;  Surgeon: Milana Malave MD;  Location:  OR    IR CHEMO EMBOLIZATION  01/22/2019    KNEE SURGERY Left     ORTHOPEDIC SURGERY      PAROTIDECTOMY, RADICAL NECK DISSECTION Right 11/02/2017    Procedure: PAROTIDECTOMY, RADICAL NECK DISSECTION;  Right Superfacial Parotidectomy , Facial nerve repair. with [facility] facial nerve monitor.;  Surgeon: Asiya Morgan MD;  [facility]    PHACOEMULSIFICATION CLEAR CORNEA WITH STANDARD INTRAOCULAR LENS IMPLANT Right 01/24/2023    Procedure: PHACOEMULSIFICATION, COMPLEX CATARACT, WITH INTRAOCULAR LENS IMPLANT WITH TRYPAN RIGHT EYE;  Surgeon: Enriqueta Martin MD;  Location: [facility] OR    PHACOEMULSIFICATION WITH STANDARD INTRAOCULAR LENS  IMPLANT Left 2023    Procedure: PHACOEMULSIFICATION, COMPLEX CATARACT, WITH STANDARD INTRAOCULAR LENS IMPLANT INSERTION LEFT EYE;  Surgeon: Enriqueta Martin MD;  Location: [facility] OR    PICC INSERTION Left 2017    4fr SL BioFlo PICC, 44cm in the L basilic vein w/ tip in the low SVC    RETURN LIVER TRANSPLANT N/A 2019    Procedure: Exploratory laparotomy, hematoma evacuation, abdominal washout;  Surgeon: Александр Ramos MD;  [facility]    TRANSPLANT LIVER RECIPIENT  DONOR N/A 2019    Procedure: TRANSPLANT, LIVER, RECIPIENT,  DONOR;  Surgeon: Александр Ramos MD;  [facility]    VASCULAR SURGERY       Social History     Socioeconomic History    Marital status:      Spouse name: Not on file    Number of children: Not on file    Years of education: Not on file    Highest education level: Bachelor's degree (e.g., BA, AB, BS)   Occupational History    Not on file   Tobacco Use    Smoking status: Former     Types: Dip, chew, snus or snuff     Passive exposure: Past    Smokeless tobacco: Former     Types: Chew     Quit date: 10/31/2017    Tobacco comments:     Quit [place] after Doctor told me in 2017 that if I didn't I would   Vaping Use    Vaping Use: Never used   Substance and Sexual Activity    Alcohol use: No     Comment: quit 1996    Drug use: No    Sexual activity: Not Currently     Partners: Female     Birth control/protection: Condom   Other Topics Concern    Parent/sibling w/ CABG, MI or angioplasty before 65F 55M? No   Social History Narrative    Prior , [facility]     Social Determinants of Health     Financial Resource Strain: Low Risk  (10/24/2023)    Financial Resource Strain     Within the past 12 months, have you or your family members you live with been unable to get utilities (heat, electricity) when it was really needed?: No   Food Insecurity: Low Risk  (10/24/2023)    Food Insecurity     Within the past 12 months, did you  worry that your food would run out before you got money to buy more?: No     Within the past 12 months, did the food you bought just not last and you didn t have money to get more?: No   Transportation Needs: Low Risk  (10/24/2023)    Transportation Needs     Within the past 12 months, has lack of transportation kept you from medical appointments, getting your medicines, non-medical meetings or appointments, work, or from getting things that you need?: No   Physical Activity: Insufficiently Active (10/13/2020)    Exercise Vital Sign     Days of Exercise per Week: 1 day     Minutes of Exercise per Session: 60 min   Stress: Stress Concern Present (10/13/2020)    [place] [employer] - Occupational Stress Questionnaire     Feeling of Stress : To some extent   Social Connections: Socially Isolated (10/13/2020)    Social Connection and Isolation Panel [NHANES]     Frequency of Communication with Friends and Family: Once a week     Frequency of Social Gatherings with Friends and Family: Once a week     Attends [employer] Services: Never     Active Member of Clubs or Organizations: No     Attends Club or Organization Meetings: Never     Marital Status:    Interpersonal Safety: Low Risk  (10/31/2023)    Interpersonal Safety     Do you feel physically and emotionally safe where you currently live?: Yes     Within the past 12 months, have you been hit, slapped, kicked or otherwise physically hurt by someone?: No     Within the past 12 months, have you been humiliated or emotionally abused in other ways by your partner or ex-partner?: No   Housing Stability: Low Risk  (10/24/2023)    Housing Stability     Do you have housing? : Yes     Are you worried about losing your housing?: No     Family History   Problem Relation Age of Onset    Skin Cancer Mother     Cancer Mother         mastectomy    Diabetes Mother          3/2016    Cerebrovascular Disease Mother         Passed away in Feb of this  "year, 80 years old.    Thyroid Disease Mother     Depression Mother     Breast Cancer Mother     Obesity Mother         280 pounds  from this and complications from D    Pancreatic Cancer Father 60    Prostate Cancer Father         Currently in Remission    Macular Degeneration Father     Glaucoma Father     Skin Cancer Father     Coronary Artery Disease Father         Started in his 70's  at 88 in [place]     Colon Cancer Father     Thyroid Disease Sister     Depression Sister     Asthma Sister     Sleep Apnea Brother     Colorectal Cancer Maternal Grandmother     Cancer Maternal Grandmother     Substance Abuse Maternal Grandmother         Alcohol    Prostate Cancer Maternal Grandfather     Substance Abuse Maternal Grandfather         Alcohol    Colorectal Cancer Paternal Grandmother     Asthma Sister         Had since birth    Liver Disease No family hx of     Melanoma No family hx of     Anesthesia Reaction No family hx of     Deep Vein Thrombosis (DVT) No family hx of      23- us lower extremity reviewed as in emr.    Lab Results   Component Value Date    A1C 6.3 2023    A1C 6.9 2022    A1C 6.0 01/10/2022    A1C 6.8 2021    A1C 6.0 2020    A1C 6.3 2020    A1C 6.6 2018    A1C 6.5 2017    A1C 7.8 10/25/2016         Lab Results   Component Value Date    WBC 5.5 11/10/2023    WBC 4.4 2021     Lab Results   Component Value Date    RBC 3.05 11/10/2023    RBC 2.35 2021     Lab Results   Component Value Date    HGB 9.6 11/10/2023    HGB 7.3 2021     Lab Results   Component Value Date    HCT 30.5 11/10/2023    HCT 22.6 2021     No components found for: \"MCT\"  Lab Results   Component Value Date     11/10/2023    MCV 96 2021     Lab Results   Component Value Date    MCH 31.5 11/10/2023    MCH 31.1 2021     Lab Results   Component Value Date    MCHC 31.5 11/10/2023    MCHC 32.3 2021     Lab Results   Component Value " "Date    RDW 15.1 11/10/2023    RDW 15.1 06/28/2021     Lab Results   Component Value Date     11/10/2023     06/28/2021     Lab Results   Component Value Date    AST 33 11/10/2023    AST 9 06/28/2021     Lab Results   Component Value Date    ALT 28 11/10/2023    ALT 20 06/28/2021     No results found for: \"BILICONJ\"   Lab Results   Component Value Date    BILITOTAL 0.5 11/10/2023    BILITOTAL 0.5 06/28/2021     Lab Results   Component Value Date    ALBUMIN 3.9 11/10/2023    ALBUMIN 4.3 07/20/2022    ALBUMIN 4.0 06/28/2021     Lab Results   Component Value Date    PROTTOTAL 6.5 11/10/2023    PROTTOTAL 7.4 06/28/2021      Lab Results   Component Value Date    ALKPHOS 107 11/10/2023    ALKPHOS 98 06/28/2021       Last Comprehensive Metabolic Panel:  Lab Results   Component Value Date     11/10/2023    POTASSIUM 4.5 11/10/2023    CHLORIDE 106 11/10/2023    CO2 24 11/10/2023    ANIONGAP 11 11/10/2023     (H) 11/10/2023    BUN 19.8 11/10/2023    CR 1.23 (H) 11/10/2023    GFRESTIMATED 68 11/10/2023    DEBORAH 7.6 (L) 11/10/2023                   Subjective findings- 58-year-old returns clinic with assistant for diabetic foot cares.  Relates he is being treated for cancer and has developed some blisters from the medication he is on, relates he seen a chiropractor and oncology.  Relates about 2 to 3 weeks ago he developed blisters on his feet and his feet hurt he is getting worse and neuropathy.  Relates no injuries.  Relates he is walking daily and is using diabetic shoes and insoles but needs to get new ones.  Relates his chiropractor and oncologist advised he follow-up with us for the blisters.  Relates to no systemic signs of infection.      Objective findings- DP and PT are 2 out of 4 bilaterally.  Has dorsally contracted digits 2 through 5 bilaterally.  Has blistering plantar left 2 through 4 MPJs, plantar left fifth MPJ, plantar medial right hallux, plantar right second MPJ, plantar right fourth " and fifth MPJs.  There is no erythema, no active drainage, no odor, no calor bilaterally.  He relates they do hurt.  Has mild maceration on the left second MPJ lesion.  Has incurvated toenails 1 through 5 bilaterally to differing degrees.    Assessment and plan- Onychauxis bilaterally.  Diabetes with peripheral Neuropathy and Hammertoes bilaterally.  Blistering bilateral plantar MPJs.  Diagnosis and treatment options discussed with them.  All the nails were reduced bilaterally upon consent.  Continue the diabetic shoes.  We will have him continue casual pace walking to toleration for the blistering and advised him on alternative exercising like biking or elliptical as well as watching his sitting positions to keep pressure off the feet when he is sitting.  We will have him paint the blisters daily with Betadine and let it dry.  We will have to use dressings if there is drainage.  Prescription for Diabetic shoes and inserts given and he is given the phone number address orthotics prosthetics lab to get those.  Return to clinic and see me in 2 weeks.  Previous notes reviewed.              High level of medical decision making with number and complexity of problems addressed-high, amount and/or complexity of data to be reviewed and analyzed-moderate, risk of complications and/or morbidity or mortality of patient management-high.      Again, thank you for allowing me to participate in the care of your patient.        Sincerely,        Lamin Gonzalez DPM

## 2023-11-13 NOTE — PROGRESS NOTES
Past Medical History:   Diagnosis Date    Anemia 2013    Arthritis     BPH (benign prostatic hyperplasia)     CAD (coronary artery disease) 04/01/2019    Cholelithiasis     Conductive hearing loss 08/16/2017    Depressive disorder 1986    Suffer effects throughout life    Gastroesophageal reflux disease 12/01/2014    HCC (hepatocellular carcinoma) (H) 01/22/2019    History of blood transfusion 2019    Had blood transfussion until Dec 2022    History of diabetic retinopathy 07/2018    HTN (hypertension) 11/20/2019    Hyperlipidemia     Liver cirrhosis secondary to ESTRADA (H)     Liver transplanted (H) 11/11/2019    Portal vein thrombosis 04/11/2019    Type II diabetes mellitus (H)      Patient Active Problem List   Diagnosis    Bipolar affective disorder in remission (H24)    Esophageal varices determined by endoscopy (H)    Erectile dysfunction due to diseases classified elsewhere    HCC (hepatocellular carcinoma) (H)    Equivocal stress echocardiogram    Type 2 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, unspecified whether long term insulin use (H)    Status post coronary angiogram    CAD (coronary artery disease)    Liver transplant recipient (H)    Status post liver transplantation (H)    Immunosuppressed status (H24)    Benign essential hypertension    Chronic kidney disease, stage 3a (H)    Anemia of chronic renal failure, stage 3a (H)    History of coronary artery disease    Dyslipidemia    Excessive sweating    Stage 3b chronic kidney disease (H)    Anemia of chronic renal failure, stage 3b (H)    NSTEMI (non-ST elevated myocardial infarction) (H)    Chest pain, unspecified type    Hypertensive chronic kidney disease with stage 5 chronic kidney disease or end stage renal disease (H)    Vitreous hemorrhage of left eye (H)    Proliferative diabetic retinopathy of both eyes associated with type 2 diabetes mellitus, unspecified proliferative retinopathy type (H)     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)    Liver replaced by transplant (H)    Hyperkalemia    Acute renal failure, unspecified acute renal failure type (H24)    ARIELA (obstructive sleep apnea)     Past Surgical History:   Procedure Laterality Date    ABDOMEN SURGERY  11/11/2019    Had Liver Transplant    BIOPSY  12/2022    Had biopsy done will have additional procedure 2/15/2023    BYPASS GRAFT ARTERY CORONARY N/A 07/14/2021    Procedure: median sternotomy, on cardiopulmonary bypass, CORONARY ARTERY BYPASS GRAFT (CABG) x2 with left greater saphenous vein endoscopic harvest and left internal mammery artery harvest;  Surgeon: Tom Zapata MD;  Location: [facility] OR    CARDIAC SURGERY  7/2021    Heart Graph. and bypass surgery    CHOLECYSTECTOMY  11/11/2022    Removed with Liver Transplant    COLONOSCOPY      2015    COLONOSCOPY N/A 12/06/2019    Procedure: COLONOSCOPY, WITH POLYPECTOMY AND BIOPSY;  Surgeon: Adam Morton MD;  Location: [facility] [place]    CV CENTRAL VENOUS CATHETER PLACEMENT N/A 07/12/2021    Procedure: Central Venous Catheter Placement;  Surgeon: Fermin Polanco MD;  Location: [facility] CARDIAC CATH LAB    CV CORONARY ANGIOGRAM N/A 07/12/2021    Procedure: Coronary Angiogram;  Surgeon: Fermin Polanco MD;  Location: [facility] CARDIAC CATH LAB    CV HEART CATHETERIZATION WITH POSSIBLE INTERVENTION N/A 02/26/2019    Procedure: CORS;  Surgeon: Jagdish Hoyt MD;  Location: [facility] CARDIAC CATH LAB    CV INTRA AORTIC BALLOON N/A 07/12/2021    Procedure: Intra Aortic Balloon Pump Insertion;  Surgeon: Fermin Polanco MD;  Location: [facility] CARDIAC CATH LAB    ESOPHAGOSCOPY, GASTROSCOPY, DUODENOSCOPY (EGD), COMBINED N/A 11/17/2016    Procedure: COMBINED ESOPHAGOSCOPY, GASTROSCOPY, DUODENOSCOPY (EGD);  Surgeon: Santi Rosas MD;  Location:  GI    ESOPHAGOSCOPY, GASTROSCOPY, DUODENOSCOPY (EGD), COMBINED N/A 11/17/2017    Procedure: COMBINED  ESOPHAGOSCOPY, GASTROSCOPY, DUODENOSCOPY (EGD);  EGD;  Surgeon: Santi Rosas MD;  [facility]    ESOPHAGOSCOPY, GASTROSCOPY, DUODENOSCOPY (EGD), COMBINED N/A 12/28/2018    Procedure: EGD;  Surgeon: Santi Rosas MD;  [facility]    ESOPHAGOSCOPY, GASTROSCOPY, DUODENOSCOPY (EGD), COMBINED N/A 12/06/2019    Procedure: ESOPHAGOGASTRODUODENOSCOPY, WITH BIOPSY;  Surgeon: Adam Morton MD;  Location: [facility]    ESOPHAGOSCOPY, GASTROSCOPY, DUODENOSCOPY (EGD), COMBINED N/A 02/13/2020    Procedure: ESOPHAGOGASTRODUODENOSCOPY (EGD);  Surgeon: Santi Rosas MD;  Location:  GI    EXCISE MASS ABDOMEN N/A 07/13/2023    Procedure: Laparoscopic lysis of adhesions, laparoscopic resection of abdominal mass;  Surgeon: Ruiz Chu MD;  Location:  OR    HEAD & NECK SURGERY      12/2017 at [facility].     IMPLANT GOLD WEIGHT EYELID Right 11/16/2017    Procedure: IMPLANT WEIGHT EYELID;  Right Upper Eyelid Weight, right tarsal strip lower eyelid;  Surgeon: Milana Malave MD;  Location:  OR    IR CHEMO EMBOLIZATION  01/22/2019    KNEE SURGERY Left     ORTHOPEDIC SURGERY      PAROTIDECTOMY, RADICAL NECK DISSECTION Right 11/02/2017    Procedure: PAROTIDECTOMY, RADICAL NECK DISSECTION;  Right Superfacial Parotidectomy , Facial nerve repair. with [facility] facial nerve monitor.;  Surgeon: Asiya Morgan MD;  Location: [facility]    PHACOEMULSIFICATION CLEAR CORNEA WITH STANDARD INTRAOCULAR LENS IMPLANT Right 01/24/2023    Procedure: PHACOEMULSIFICATION, COMPLEX CATARACT, WITH INTRAOCULAR LENS IMPLANT WITH TRYPAN RIGHT EYE;  Surgeon: Enriqueta Martin MD;  Location: [facility] OR    PHACOEMULSIFICATION WITH STANDARD INTRAOCULAR LENS IMPLANT Left 01/03/2023    Procedure: PHACOEMULSIFICATION, COMPLEX CATARACT, WITH STANDARD INTRAOCULAR LENS IMPLANT INSERTION LEFT EYE;  Surgeon: Enriqueta Martin MD;  Location: [facility] OR    PICC INSERTION Left 11/06/2017    4fr SL BioFlo PICC, 44cm in the L  basilic vein w/ tip in the low SVC    RETURN LIVER TRANSPLANT N/A 2019    Procedure: Exploratory laparotomy, hematoma evacuation, abdominal washout;  Surgeon: Александр Ramos MD;  Location: [facility] OR    TRANSPLANT LIVER RECIPIENT  DONOR N/A 2019    Procedure: TRANSPLANT, LIVER, RECIPIENT,  DONOR;  Surgeon: Александр Ramos MD;  Location: [facility] OR    VASCULAR SURGERY       Social History     Socioeconomic History    Marital status:      Spouse name: Not on file    Number of children: Not on file    Years of education: Not on file    Highest education level: Bachelor's degree (e.g., BA, AB, BS)   Occupational History    Not on file   Tobacco Use    Smoking status: Former     Types: Dip, chew, snus or snuff     Passive exposure: Past    Smokeless tobacco: Former     Types: Chew     Quit date: 10/31/2017    Tobacco comments:     Quit [place] after Doctor told me in  that if I didn't I would   Vaping Use    Vaping Use: Never used   Substance and Sexual Activity    Alcohol use: No     Comment: quit 1996    Drug use: No    Sexual activity: Not Currently     Partners: Female     Birth control/protection: Condom   Other Topics Concern    Parent/sibling w/ CABG, MI or angioplasty before 65F 55M? No   Social History Narrative    Prior [facility], [facility]     Social Determinants of Health     Financial Resource Strain: Low Risk  (10/24/2023)    Financial Resource Strain     Within the past 12 months, have you or your family members you live with been unable to get utilities (heat, electricity) when it was really needed?: No   Food Insecurity: Low Risk  (10/24/2023)    Food Insecurity     Within the past 12 months, did you worry that your food would run out before you got money to buy more?: No     Within the past 12 months, did the food you bought just not last and you didn t have money to get more?: No   Transportation Needs: Low Risk  (10/24/2023)    Transportation Needs      Within the past 12 months, has lack of transportation kept you from medical appointments, getting your medicines, non-medical meetings or appointments, work, or from getting things that you need?: No   Physical Activity: Insufficiently Active (10/13/2020)    Exercise Vital Sign     Days of Exercise per Week: 1 day     Minutes of Exercise per Session: 60 min   Stress: Stress Concern Present (10/13/2020)    [place] [employer] - Occupational Stress Questionnaire     Feeling of Stress : To some extent   Social Connections: Socially Isolated (10/13/2020)    Social Connection and Isolation Panel [NHANES]     Frequency of Communication with Friends and Family: Once a week     Frequency of Social Gatherings with Friends and Family: Once a week     Attends [employer] Services: Never     Active Member of Clubs or Organizations: No     Attends Club or Organization Meetings: Never     Marital Status:    Interpersonal Safety: Low Risk  (10/31/2023)    Interpersonal Safety     Do you feel physically and emotionally safe where you currently live?: Yes     Within the past 12 months, have you been hit, slapped, kicked or otherwise physically hurt by someone?: No     Within the past 12 months, have you been humiliated or emotionally abused in other ways by your partner or ex-partner?: No   Housing Stability: Low Risk  (10/24/2023)    Housing Stability     Do you have housing? : Yes     Are you worried about losing your housing?: No     Family History   Problem Relation Age of Onset    Skin Cancer Mother     Cancer Mother         mastectomy    Diabetes Mother          3/2016    Cerebrovascular Disease Mother         Passed away in Feb of this year, 80 years old.    Thyroid Disease Mother     Depression Mother     Breast Cancer Mother     Obesity Mother         280 pounds  from this and complications from D    Pancreatic Cancer Father 60    Prostate Cancer Father         Currently in Remission     "Macular Degeneration Father     Glaucoma Father     Skin Cancer Father     Coronary Artery Disease Father         Started in his 70's  at 88 in 2023    Colon Cancer Father     Thyroid Disease Sister     Depression Sister     Asthma Sister     Sleep Apnea Brother     Colorectal Cancer Maternal Grandmother     Cancer Maternal Grandmother     Substance Abuse Maternal Grandmother         Alcohol    Prostate Cancer Maternal Grandfather     Substance Abuse Maternal Grandfather         Alcohol    Colorectal Cancer Paternal Grandmother     Asthma Sister         Had since birth    Liver Disease No family hx of     Melanoma No family hx of     Anesthesia Reaction No family hx of     Deep Vein Thrombosis (DVT) No family hx of      23-  lower extremity reviewed as in emr.    Lab Results   Component Value Date    A1C 6.3 2023    A1C 6.9 2022    A1C 6.0 01/10/2022    A1C 6.8 2021    A1C 6.0 2020    A1C 6.3 2020    A1C 6.6 2018    A1C 6.5 2017    A1C 7.8 10/25/2016         Lab Results   Component Value Date    WBC 5.5 11/10/2023    WBC 4.4 2021     Lab Results   Component Value Date    RBC 3.05 11/10/2023    RBC 2.35 2021     Lab Results   Component Value Date    HGB 9.6 11/10/2023    HGB 7.3 2021     Lab Results   Component Value Date    HCT 30.5 11/10/2023    HCT 22.6 2021     No components found for: \"MCT\"  Lab Results   Component Value Date     11/10/2023    MCV 96 2021     Lab Results   Component Value Date    MCH 31.5 11/10/2023    MCH 31.1 2021     Lab Results   Component Value Date    MCHC 31.5 11/10/2023    MCHC 32.3 2021     Lab Results   Component Value Date    RDW 15.1 11/10/2023    RDW 15.1 2021     Lab Results   Component Value Date     11/10/2023     2021     Lab Results   Component Value Date    AST 33 11/10/2023    AST 9 2021     Lab Results   Component Value Date    ALT 28 " "11/10/2023    ALT 20 06/28/2021     No results found for: \"BILICONJ\"   Lab Results   Component Value Date    BILITOTAL 0.5 11/10/2023    BILITOTAL 0.5 06/28/2021     Lab Results   Component Value Date    ALBUMIN 3.9 11/10/2023    ALBUMIN 4.3 07/20/2022    ALBUMIN 4.0 06/28/2021     Lab Results   Component Value Date    PROTTOTAL 6.5 11/10/2023    PROTTOTAL 7.4 06/28/2021      Lab Results   Component Value Date    ALKPHOS 107 11/10/2023    ALKPHOS 98 06/28/2021       Last Comprehensive Metabolic Panel:  Lab Results   Component Value Date     11/10/2023    POTASSIUM 4.5 11/10/2023    CHLORIDE 106 11/10/2023    CO2 24 11/10/2023    ANIONGAP 11 11/10/2023     (H) 11/10/2023    BUN 19.8 11/10/2023    CR 1.23 (H) 11/10/2023    GFRESTIMATED 68 11/10/2023    DEBORAH 7.6 (L) 11/10/2023                   Subjective findings- 58-year-old returns clinic with assistant for diabetic foot cares.  Relates he is being treated for cancer and has developed some blisters from the medication he is on, relates he seen a chiropractor and oncology.  Relates about 2 to 3 weeks ago he developed blisters on his feet and his feet hurt he is getting worse and neuropathy.  Relates no injuries.  Relates he is walking daily and is using diabetic shoes and insoles but needs to get new ones.  Relates his chiropractor and oncologist advised he follow-up with us for the blisters.  Relates to no systemic signs of infection.      Objective findings- DP and PT are 2 out of 4 bilaterally.  Has dorsally contracted digits 2 through 5 bilaterally.  Has blistering plantar left 2 through 4 MPJs, plantar left fifth MPJ, plantar medial right hallux, plantar right second MPJ, plantar right fourth and fifth MPJs.  There is no erythema, no active drainage, no odor, no calor bilaterally.  He relates they do hurt.  Has mild maceration on the left second MPJ lesion.  Has incurvated toenails 1 through 5 bilaterally to differing degrees.    Assessment and plan- " Onychauxis bilaterally.  Diabetes with peripheral Neuropathy and Hammertoes bilaterally.  Blistering bilateral plantar MPJs.  Diagnosis and treatment options discussed with them.  All the nails were reduced bilaterally upon consent.  Continue the diabetic shoes.  We will have him continue casual pace walking to toleration for the blistering and advised him on alternative exercising like biking or elliptical as well as watching his sitting positions to keep pressure off the feet when he is sitting.  We will have him paint the blisters daily with Betadine and let it dry.  We will have to use dressings if there is drainage.  Prescription for Diabetic shoes and inserts given and he is given the phone number address orthotics prosthetics lab to get those.  Return to clinic and see me in 2 weeks.  Previous notes reviewed.              High level of medical decision making with number and complexity of problems addressed-high, amount and/or complexity of data to be reviewed and analyzed-moderate, risk of complications and/or morbidity or mortality of patient management-high.

## 2023-11-14 ENCOUNTER — LAB (OUTPATIENT)
Dept: LAB | Facility: CLINIC | Age: 58
End: 2023-11-14
Payer: MEDICARE

## 2023-11-14 ENCOUNTER — ONCOLOGY VISIT (OUTPATIENT)
Dept: ONCOLOGY | Facility: CLINIC | Age: 58
End: 2023-11-14
Attending: NURSE PRACTITIONER
Payer: MEDICARE

## 2023-11-14 VITALS
OXYGEN SATURATION: 100 % | DIASTOLIC BLOOD PRESSURE: 74 MMHG | RESPIRATION RATE: 18 BRPM | WEIGHT: 172 LBS | TEMPERATURE: 97.9 F | HEART RATE: 90 BPM | BODY MASS INDEX: 25.4 KG/M2 | SYSTOLIC BLOOD PRESSURE: 129 MMHG

## 2023-11-14 DIAGNOSIS — L27.1 HAND FOOT SYNDROME: ICD-10-CM

## 2023-11-14 DIAGNOSIS — F31.0 BIPOLAR AFFECTIVE DISORDER, CURRENT EPISODE HYPOMANIC (H): ICD-10-CM

## 2023-11-14 DIAGNOSIS — N18.31 CHRONIC KIDNEY DISEASE, STAGE 3A (H): ICD-10-CM

## 2023-11-14 DIAGNOSIS — Z94.4 LIVER TRANSPLANT RECIPIENT (H): ICD-10-CM

## 2023-11-14 DIAGNOSIS — C78.6 MALIGNANT NEOPLASM METASTATIC TO PERITONEUM (H): ICD-10-CM

## 2023-11-14 DIAGNOSIS — Z94.4 STATUS POST LIVER TRANSPLANTATION (H): ICD-10-CM

## 2023-11-14 DIAGNOSIS — E11.3593 PROLIFERATIVE DIABETIC RETINOPATHY OF BOTH EYES ASSOCIATED WITH TYPE 2 DIABETES MELLITUS, UNSPECIFIED PROLIFERATIVE RETINOPATHY TYPE (H): Primary | ICD-10-CM

## 2023-11-14 DIAGNOSIS — C22.0 HCC (HEPATOCELLULAR CARCINOMA) (H): Primary | ICD-10-CM

## 2023-11-14 DIAGNOSIS — I82.452 ACUTE DEEP VEIN THROMBOSIS (DVT) OF LEFT PERONEAL VEIN (H): ICD-10-CM

## 2023-11-14 DIAGNOSIS — E83.39 HYPOPHOSPHATEMIA: ICD-10-CM

## 2023-11-14 DIAGNOSIS — C22.0 HCC (HEPATOCELLULAR CARCINOMA) (H): ICD-10-CM

## 2023-11-14 DIAGNOSIS — E83.42 HYPOMAGNESEMIA: ICD-10-CM

## 2023-11-14 LAB
AFP SERPL-MCNC: <1.8 NG/ML
ALBUMIN SERPL BCG-MCNC: 4 G/DL (ref 3.5–5.2)
ALP SERPL-CCNC: 104 U/L (ref 40–129)
ALT SERPL W P-5'-P-CCNC: 25 U/L (ref 0–70)
ANION GAP SERPL CALCULATED.3IONS-SCNC: 9 MMOL/L (ref 7–15)
AST SERPL W P-5'-P-CCNC: 31 U/L (ref 0–45)
BASOPHILS # BLD AUTO: 0 10E3/UL (ref 0–0.2)
BASOPHILS NFR BLD AUTO: 1 %
BILIRUB SERPL-MCNC: 0.3 MG/DL
BUN SERPL-MCNC: 22.8 MG/DL (ref 8–23)
CALCIUM SERPL-MCNC: 7.9 MG/DL (ref 8.6–10)
CHLORIDE SERPL-SCNC: 104 MMOL/L (ref 98–107)
CREAT SERPL-MCNC: 1.22 MG/DL (ref 0.67–1.17)
DEPRECATED HCO3 PLAS-SCNC: 25 MMOL/L (ref 22–29)
EGFRCR SERPLBLD CKD-EPI 2021: 68 ML/MIN/1.73M2
EOSINOPHIL # BLD AUTO: 0.1 10E3/UL (ref 0–0.7)
EOSINOPHIL NFR BLD AUTO: 2 %
ERYTHROCYTE [DISTWIDTH] IN BLOOD BY AUTOMATED COUNT: 15 % (ref 10–15)
GLUCOSE SERPL-MCNC: 163 MG/DL (ref 70–99)
HCT VFR BLD AUTO: 29.5 % (ref 40–53)
HGB BLD-MCNC: 9.1 G/DL (ref 13.3–17.7)
IMM GRANULOCYTES # BLD: 0 10E3/UL
IMM GRANULOCYTES NFR BLD: 1 %
LYMPHOCYTES # BLD AUTO: 0.7 10E3/UL (ref 0.8–5.3)
LYMPHOCYTES NFR BLD AUTO: 17 %
MAGNESIUM SERPL-MCNC: 1.4 MG/DL (ref 1.7–2.3)
MCH RBC QN AUTO: 31.4 PG (ref 26.5–33)
MCHC RBC AUTO-ENTMCNC: 30.8 G/DL (ref 31.5–36.5)
MCV RBC AUTO: 102 FL (ref 78–100)
MONOCYTES # BLD AUTO: 0.3 10E3/UL (ref 0–1.3)
MONOCYTES NFR BLD AUTO: 7 %
NEUTROPHILS # BLD AUTO: 3.1 10E3/UL (ref 1.6–8.3)
NEUTROPHILS NFR BLD AUTO: 72 %
NRBC # BLD AUTO: 0 10E3/UL
NRBC BLD AUTO-RTO: 0 /100
PHOSPHATE SERPL-MCNC: 3.1 MG/DL (ref 2.5–4.5)
PLATELET # BLD AUTO: 114 10E3/UL (ref 150–450)
POTASSIUM SERPL-SCNC: 5.3 MMOL/L (ref 3.4–5.3)
PROT SERPL-MCNC: 6.5 G/DL (ref 6.4–8.3)
RBC # BLD AUTO: 2.9 10E6/UL (ref 4.4–5.9)
SODIUM SERPL-SCNC: 138 MMOL/L (ref 135–145)
WBC # BLD AUTO: 4.2 10E3/UL (ref 4–11)

## 2023-11-14 PROCEDURE — 80053 COMPREHEN METABOLIC PANEL: CPT | Performed by: PATHOLOGY

## 2023-11-14 PROCEDURE — 84100 ASSAY OF PHOSPHORUS: CPT | Performed by: PATHOLOGY

## 2023-11-14 PROCEDURE — 99214 OFFICE O/P EST MOD 30 MIN: CPT | Performed by: NURSE PRACTITIONER

## 2023-11-14 PROCEDURE — 85025 COMPLETE CBC W/AUTO DIFF WBC: CPT | Performed by: PATHOLOGY

## 2023-11-14 PROCEDURE — 99000 SPECIMEN HANDLING OFFICE-LAB: CPT | Performed by: PATHOLOGY

## 2023-11-14 PROCEDURE — 83735 ASSAY OF MAGNESIUM: CPT | Performed by: PATHOLOGY

## 2023-11-14 PROCEDURE — G0463 HOSPITAL OUTPT CLINIC VISIT: HCPCS | Performed by: NURSE PRACTITIONER

## 2023-11-14 PROCEDURE — 82105 ALPHA-FETOPROTEIN SERUM: CPT | Performed by: INTERNAL MEDICINE

## 2023-11-14 PROCEDURE — 36415 COLL VENOUS BLD VENIPUNCTURE: CPT | Performed by: PATHOLOGY

## 2023-11-14 ASSESSMENT — PAIN SCALES - GENERAL: PAINLEVEL: MODERATE PAIN (5)

## 2023-11-14 NOTE — NURSING NOTE
"Oncology Rooming Note    November 14, 2023 7:42 AM   Frandy Workman is a 58 year old male who presents for:    Chief Complaint   Patient presents with    Oncology Clinic Visit     HCC     Initial Vitals: /74   Pulse 90   Temp 97.9  F (36.6  C) (Oral)   Resp 18   Wt 78 kg (172 lb)   SpO2 100%   BMI 25.40 kg/m   Estimated body mass index is 25.4 kg/m  as calculated from the following:    Height as of 10/31/23: 1.753 m (5' 9\").    Weight as of this encounter: 78 kg (172 lb). Body surface area is 1.95 meters squared.  Moderate Pain (5) Comment: Data Unavailable   No LMP for male patient.  Allergies reviewed: Yes  Medications reviewed: Yes    Medications: Medication refills not needed today.  Pharmacy name entered into [employer]:    [facility] - [place], [place] - [address] [facility] - [place], [place] - [address] [place] [address] [phone]  [id number] - [place] [phone]    Clinical concerns:        Hannah Rogers CMA              "

## 2023-11-14 NOTE — LETTER
11/14/2023         RE: Frandy Workman  [address] [facility] [address]        Dear Colleague,    Thank you for referring your patient, Frandy Workman, to the [facility]. Please see a copy of my visit note below.    Reason for Visit: seen in follow-up of recurrent, metastatic HCC    Oncology HPI:   Miller Workman is a 58 year old man with a PMH of DMII, bipolar 1 disorder, depressed with LIZZETTE, cirrhosis s/t ESTRADA with subsequent development of HCC, s/p liver transplant , HLD, HTN, GERD, BPH and CAD with hx of 2 vessel CABG in July 2021, CKD with anemia of chronic disease, on erythropoetin.      He as diagnosed with hepatocellular carcinoma in December 2018 when he was found to have 2 LIRADS 5 lesions on surveillance imaging. He underwent TACE on 1/22/19.  He is s/p  liver transplant on 11/11/2019. The explanted liver had 2 lesions that were mostly necrotic and less than 3 cm with no clearly identifiable vascular invasion.       He was found to have peritoneal masses on routine surveillance imaging in June 2023. He underwent laparoscopy with biopsies and pericecal mass resection confirming metastatic HCC.      He met with Dr. Villarreal in July with recommendation to start sorafenib. He started 400 mg BID on 7/29/23.      He was admitted 8/10-8/16 with hyperkalemia, acute kidney injury. He required one round of hemodialysis. He was noted to have thrombocytopenia with a platelet count from 178 t9 97. A HIT VAHID was negative.   Renal function and hyperkalemia improved through the course of hospitalization. He was followed by nephrology. He was drinking 1.6 L of fluid.  Jardiance and lisinopril held. The cause of the JERONIMO was in the context of severe nausea and vomiting and diarrhea prior to admission.       He resumed sorafenib 200 mg/day on 9/12/23.  He had repeat CT imaging when he saw  on 9/21/23 that was stable. He was recommended to continue sorafenib, the dose was  increased to 200 mg bid, then increased further to 200/400 mg bid on 10/6/23.      He presented with leg swelling on 10/11/23 and was found to have a DVT in the left peroneal vein. He was started on eliquis.     He increased sorafenib to 400 mg bid on 10/20/23.     Was found to have RSV on 10/20/23.     Did not tolerate the sorafenib at 400 mg bid and decreased to 400 mg/200 mg dosing.    Interval history:   Miller has developed pain and blistering on the soles of the feet. Has also had some aches in the lower legs, though this is getting better with chiropractic work. No leg swelling. No missed doses of apixiban. Met with Dr. Gonzalez and received recommendations  on blister management and orthotics.  Miller notes he is also planning to decrease walking from 50774 steps/day to 9000 steps a day.  No mouth sores. No scalp pain. No nausea. Diarrhea is worsening again, up to 3-4 /day in spite of imodium.  He is monitoring his blood pressure and glucoses frequently.    Current Outpatient Medications   Medication Sig Dispense Refill    albuterol (PROAIR HFA/PROVENTIL HFA/VENTOLIN HFA) 108 (90 Base) MCG/ACT inhaler Inhale 2 puffs into the lungs every 4 hours as needed for shortness of breath, wheezing or cough 18 g 1    Alcohol Swabs (ALCOHOL PREP) 70 % PADS Use for glucose testing 4x daily  and insulin administration 4x daily. 400 each 1    ammonium lactate (AMLACTIN) 12 % external cream Apply topically daily as needed for dry skin (on feet) 140 g 5    apixaban ANTICOAGULANT (ELIQUIS) 5 MG tablet Take 1 tablet (5 mg) by mouth 2 times daily 60 tablet 3    BD VIKTORIA U/F 32G X 4 MM insulin pen needle Use 5 per day 300 each 3    benzoyl peroxide 5 % external liquid Use topically in showers as a body wash 226 g 11    blood glucose (NO BRAND SPECIFIED) lancets standard Use to test blood sugar 1 times daily or as directed. 100 each 11    Continuous Blood Gluc Sensor (FREESTYLE CLAUDIA 2 SENSOR) [facility] [place] 1 each See Admin Instructions Change  every 14 days. 7 each 3    insulin aspart (NOVOLOG PEN) 100 UNIT/ML pen Inject 5-10 Units Subcutaneous 4 times daily (with meals and nightly) 1unit : 8 g carb before meals.  Also add 1 unit : 50 mg/dl >180 before meals and at bedtime. 45 mL 3    insulin degludec (TRESIBA FLEXTOUCH) 100 UNIT/ML pen Inject 15 units subcutaneous once daily 45 mL 3    K-Phos-Neutral 155-852-130 MG tablet Take 1 tablet by mouth 4 times daily 120 tablet 1    ketoconazole (NIZORAL) 2 % external shampoo Apply thin layer topically to scalp in shower (leave on 5 min prior to rinse); may also use as a body wash 120 mL 11    lamiVUDine (EPIVIR) 100 MG tablet Take 1 tablet (100 mg) by mouth daily 90 tablet 3    lisinopril (ZESTRIL) 5 MG tablet Take 1 tablet (5 mg) by mouth daily 90 tablet 3    loperamide (IMODIUM A-D) 2 MG tablet Take 1-2 tablets (2-4 mg) by mouth 4 times daily as needed for diarrhea 120 tablet 3    magnesium oxide (MAG-OX) 400 MG tablet Take 1 tablet (400 mg) by mouth daily 30 tablet 0    metoprolol succinate ER (TOPROL XL) 25 MG 24 hr tablet Take 1 tablet (25 mg) by mouth daily 45 tablet 1    Multiple Vitamin (TAB-A-GLADIS) TABS TAKE ONE TABLET BY MOUTH ONCE DAILY 90 tablet 0    mycophenolate (GENERIC EQUIVALENT) 250 MG capsule Take 2 capsules (500 mg) by mouth every 12 hours 120 capsule 11    NIFEdipine ER OSMOTIC (PROCARDIA XL) 60 MG 24 hr tablet Take 1 tablet (60 mg) by mouth 2 times daily 60 tablet 11    ondansetron (ZOFRAN ODT) 8 MG ODT tab Take 1 tablet (8 mg) by mouth every 8 hours as needed for nausea (Patient not taking: Reported on 10/30/2023) 30 tablet 3    pantoprazole (PROTONIX) 40 MG EC tablet Take 1 tablet (40 mg) by mouth daily before breakfast 90 tablet 3    predniSONE (DELTASONE) 5 MG tablet Take 1 tablet (5 mg) by mouth daily 90 tablet 3    rosuvastatin (CRESTOR) 20 MG tablet Take 1 tablet (20 mg) by mouth daily 90 tablet 3    SORAfenib (NEXAVAR) 200 MG tablet Take 200 mg by mouth in AM and 400 mg in PM on an  empty stomach 1 hour before or 2 hours after a meal. 90 tablet 0    tamsulosin (FLOMAX) 0.4 MG capsule Take 1 capsule (0.4 mg) by mouth daily 90 capsule 3    Vitamin D3 50 mcg (2000 units) tablet Take 1 tablet (50 mcg) by mouth daily 90 tablet 3          Allergies   Allergen Reactions    Codeine Other (See Comments)     Cannot take due to liver  Cannot tolerate oral narcotics    Seasonal Allergies      Sneezing, coughing, runny and itchy eyes         Exam: alert, appears well.  Blood pressure 129/74, pulse 90, temperature 97.9  F (36.6  C), temperature source Oral, resp. rate 18, weight 78 kg (172 lb), SpO2 100%.  Wt Readings from Last 4 Encounters:   10/31/23 79.4 kg (175 lb 1.6 oz)   10/30/23 79.8 kg (175 lb 14.4 oz)   10/26/23 78.5 kg (173 lb)   10/20/23 77.1 kg (170 lb)   Oropharynx is moist and without lesion. Neck supple and without adenopathy. Lungs:CTA. Heart: RRR, no murmur or rub. Abdomen: soft, nontender, BS active, no masses or organomegaly.  Extremities: warm, closed blisters on the pad of the feet bilaterally.          I have reviewed the following labs and imaging.    Labs:    Latest Reference Range & Units 11/10/23 13:36   Sodium 135 - 145 mmol/L 141   Potassium 3.4 - 5.3 mmol/L 4.5   Chloride 98 - 107 mmol/L 106   Carbon Dioxide (CO2) 22 - 29 mmol/L 24   Urea Nitrogen 8.0 - 23.0 mg/dL 19.8   Creatinine 0.67 - 1.17 mg/dL 1.23 (H)   GFR Estimate >60 mL/min/1.73m2 68   Calcium 8.6 - 10.0 mg/dL 7.6 (L)   Anion Gap 7 - 15 mmol/L 11   Magnesium 1.7 - 2.3 mg/dL 1.3 (L)   Phosphorus 2.5 - 4.5 mg/dL 2.4 (L)   Albumin 3.5 - 5.2 g/dL 3.9   Protein Total 6.4 - 8.3 g/dL 6.5   Alkaline Phosphatase 40 - 129 U/L 107   ALT 0 - 70 U/L 28   AST 0 - 45 U/L 33   Bilirubin Total <=1.2 mg/dL 0.5   Glucose 70 - 99 mg/dL 243 (H)   WBC 4.0 - 11.0 10e3/uL 5.5   Hemoglobin 13.3 - 17.7 g/dL 9.6 (L)   Hematocrit 40.0 - 53.0 % 30.5 (L)   Platelet Count 150 - 450 10e3/uL 100 (L)   RBC Count 4.40 - 5.90 10e6/uL 3.05 (L)   MCV 78 -  "100 fL 100   MCH 26.5 - 33.0 pg 31.5   MCHC 31.5 - 36.5 g/dL 31.5   RDW 10.0 - 15.0 % 15.1 (H)   % Neutrophils % 88   % Lymphocytes % 8   % Monocytes % 3   % Eosinophils % 0   % Basophils % 0   Absolute Basophils 0.0 - 0.2 10e3/uL 0.0   Absolute Eosinophils 0.0 - 0.7 10e3/uL 0.0   Absolute Immature Granulocytes <=0.4 10e3/uL 0.0   Absolute Lymphocytes 0.8 - 5.3 10e3/uL 0.4 (L)   Absolute Monocytes 0.0 - 1.3 10e3/uL 0.2   % Immature Granulocytes % 1   Absolute Neutrophils 1.6 - 8.3 10e3/uL 4.8   Absolute NRBCs 10e3/uL 0.0   NRBCs per 100 WBC <1 /100 0   (H): Data is abnormally high  (L): Data is abnormally low    Imaging:   Narrative & Impression   ULTRASOUND LOWER EXTREMITY VENOUS DUPLEX RIGHT 11/13/2023 9:14 AM     CLINICAL HISTORY: HCC (hepatocellular carcinoma) (H); Swelling of  limb; Calf pain.      COMPARISONS: Ultrasound lower extremity venous duplex bilateral  10/11/2023     REFERRING PROVIDER: RYDER MOORE     TECHNIQUE: Grayscale, color Doppler, Doppler waveform ultrasound  evaluation was performed through the right common femoral, femoral,  and popliteal veins. Right posterior tibial and peroneal veins were  evaluated with grayscale imaging and compression.     Left common femoral vein was evaluated for symmetry.     FINDINGS: Left common femoral vein is patent, fully compressible, and  demonstrates normal phasic Doppler waveform.     Right common femoral, femoral, and popliteal veins are fully  compressible, patent, and demonstrate normal phasic Doppler waveforms.     Right posterior tibial veins are fully compressible to the ankle.     Right peroneal veins are fully compressible to the distal calf.                                                                      IMPRESSION: No deep venous thrombosis demonstrated in the RIGHT leg.     Reference: \"Duplex Ultrasound in the Diagnosis of Lower-Extremity Deep  Venous Thrombosis\"- Charlene Esteban MD, S; Julian Zhou MD  (Circulation. " 2014;129:917-921. http://circ.ahajournals.org)     I have personally reviewed the examination and initial interpretation  and I agree with the findings.     MIGUEL ANGEL TURPIN MD        Impression/plan:     Recurrent HCC  -he is having increased hand/foot syndrome, even with the dose reduced 200mg/400 mg dosing   -the degree to which he is experiencing the hand/foot is moderate, but not needing to stop sorafenib completely  -also noting increased diarrhea as well  -recommend decreasing back to 200 mg bid dosing.  -follow-up with Dr. Villarreal as scheduled for restaging in 2 weeks    Hypophos--s/t sorafenib, common side effect   -now normal, continue phos replacement QID     Hypomag--s/t diarrhea  -on MagOx 400 mg daily, improving, but still low, increased to bid x 3 days, then back to 400 mg daily    L peroneal DVT, dx 10/11/23  -on Eliquis, will remain on indefinitely with his malignancy  -no DVT on RLE, pain was related to hand/foot syndrome    Hand/foot syndrome  -reviewed strategies to reduce friction, already discussed with Dr. Gonzalez, will be getting new orthotics, wearing synthetic socks, using iodine dressings. Decrease walking from 23698 steps to 9000 steps/day    Thrombocytopenia, s/t sorafenib, no evidence of HIT on VAHID while hospitalized  -at acceptable levels whie on sorafenib, monitor     Hx of liver transplant  -immunosuppression with MMF, prednisone as per Dr. Banuelos     CAD s/p 2 vessel CABG 2021  HTN  CKD  -following with  and Dr. Bhatt  -on metoprolol, recently started on nifedipine 60 mg bid  -BP mildly elevated, continues to monitor    RSV-recently dx  Clinically stable, still has a cough, but not worsening  Continues to use albuterol prn      Brenda Wilson, SHANIQUE CNP

## 2023-11-14 NOTE — PROGRESS NOTES
Reason for Visit: seen in follow-up of recurrent, metastatic HCC    Oncology HPI:   Miller Workman is a 58 year old man with a PMH of DMII, bipolar 1 disorder, depressed with LIZZETTE, cirrhosis s/t ESTRADA with subsequent development of HCC, s/p liver transplant , HLD, HTN, GERD, BPH and CAD with hx of 2 vessel CABG in July 2021, CKD with anemia of chronic disease, on erythropoetin.      He as diagnosed with hepatocellular carcinoma in December 2018 when he was found to have 2 LIRADS 5 lesions on surveillance imaging. He underwent TACE on 1/22/19.  He is s/p  liver transplant on 11/11/2019. The explanted liver had 2 lesions that were mostly necrotic and less than 3 cm with no clearly identifiable vascular invasion.       He was found to have peritoneal masses on routine surveillance imaging in June 2023. He underwent laparoscopy with biopsies and pericecal mass resection confirming metastatic HCC.      He met with Dr. Villarreal in July with recommendation to start sorafenib. He started 400 mg BID on 7/29/23.      He was admitted 8/10-8/16 with hyperkalemia, acute kidney injury. He required one round of hemodialysis. He was noted to have thrombocytopenia with a platelet count from 178 t9 97. A HIT VAHID was negative.   Renal function and hyperkalemia improved through the course of hospitalization. He was followed by nephrology. He was drinking 1.6 L of fluid.  Jardiance and lisinopril held. The cause of the JERONIMO was in the context of severe nausea and vomiting and diarrhea prior to admission.       He resumed sorafenib 200 mg/day on 9/12/23.  He had repeat CT imaging when he saw  on 9/21/23 that was stable. He was recommended to continue sorafenib, the dose was increased to 200 mg bid, then increased further to 200/400 mg bid on 10/6/23.      He presented with leg swelling on 10/11/23 and was found to have a DVT in the left peroneal vein. He was started on eliquis.     He increased sorafenib to 400 mg bid on 10/20/23.      Was found to have RSV on 10/20/23.     Did not tolerate the sorafenib at 400 mg bid and decreased to 400 mg/200 mg dosing.    Interval history:   Miller has developed pain and blistering on the soles of the feet. Has also had some aches in the lower legs, though this is getting better with chiropractic work. No leg swelling. No missed doses of apixiban. Met with Dr. Gonzalez and received recommendations  on blister management and orthotics.  Miller notes he is also planning to decrease walking from 03335 steps/day to 9000 steps a day.  No mouth sores. No scalp pain. No nausea. Diarrhea is worsening again, up to 3-4 /day in spite of imodium.  He is monitoring his blood pressure and glucoses frequently.    Current Outpatient Medications   Medication Sig Dispense Refill    albuterol (PROAIR HFA/PROVENTIL HFA/VENTOLIN HFA) 108 (90 Base) MCG/ACT inhaler Inhale 2 puffs into the lungs every 4 hours as needed for shortness of breath, wheezing or cough 18 g 1    Alcohol Swabs (ALCOHOL PREP) 70 % PADS Use for glucose testing 4x daily  and insulin administration 4x daily. 400 each 1    ammonium lactate (AMLACTIN) 12 % external cream Apply topically daily as needed for dry skin (on feet) 140 g 5    apixaban ANTICOAGULANT (ELIQUIS) 5 MG tablet Take 1 tablet (5 mg) by mouth 2 times daily 60 tablet 3    BD VIKTORIA U/F 32G X 4 MM insulin pen needle Use 5 per day 300 each 3    benzoyl peroxide 5 % external liquid Use topically in showers as a body wash 226 g 11    blood glucose (NO BRAND SPECIFIED) lancets standard Use to test blood sugar 1 times daily or as directed. 100 each 11    Continuous Blood Gluc Sensor (FREESTYLE CLAUDIA 2 SENSOR) [facility] [place] 1 each See Admin Instructions Change every 14 days. 7 each 3    insulin aspart (NOVOLOG PEN) 100 UNIT/ML pen Inject 5-10 Units Subcutaneous 4 times daily (with meals and nightly) 1unit : 8 g carb before meals.  Also add 1 unit : 50 mg/dl >180 before meals and at bedtime. 45 mL 3    insulin degludec  (TRESIBA FLEXTOUCH) 100 UNIT/ML pen Inject 15 units subcutaneous once daily 45 mL 3    K-Phos-Neutral 155-852-130 MG tablet Take 1 tablet by mouth 4 times daily 120 tablet 1    ketoconazole (NIZORAL) 2 % external shampoo Apply thin layer topically to scalp in shower (leave on 5 min prior to rinse); may also use as a body wash 120 mL 11    lamiVUDine (EPIVIR) 100 MG tablet Take 1 tablet (100 mg) by mouth daily 90 tablet 3    lisinopril (ZESTRIL) 5 MG tablet Take 1 tablet (5 mg) by mouth daily 90 tablet 3    loperamide (IMODIUM A-D) 2 MG tablet Take 1-2 tablets (2-4 mg) by mouth 4 times daily as needed for diarrhea 120 tablet 3    magnesium oxide (MAG-OX) 400 MG tablet Take 1 tablet (400 mg) by mouth daily 30 tablet 0    metoprolol succinate ER (TOPROL XL) 25 MG 24 hr tablet Take 1 tablet (25 mg) by mouth daily 45 tablet 1    Multiple Vitamin (TAB-A-GLADIS) TABS TAKE ONE TABLET BY MOUTH ONCE DAILY 90 tablet 0    mycophenolate (GENERIC EQUIVALENT) 250 MG capsule Take 2 capsules (500 mg) by mouth every 12 hours 120 capsule 11    NIFEdipine ER OSMOTIC (PROCARDIA XL) 60 MG 24 hr tablet Take 1 tablet (60 mg) by mouth 2 times daily 60 tablet 11    ondansetron (ZOFRAN ODT) 8 MG ODT tab Take 1 tablet (8 mg) by mouth every 8 hours as needed for nausea (Patient not taking: Reported on 10/30/2023) 30 tablet 3    pantoprazole (PROTONIX) 40 MG EC tablet Take 1 tablet (40 mg) by mouth daily before breakfast 90 tablet 3    predniSONE (DELTASONE) 5 MG tablet Take 1 tablet (5 mg) by mouth daily 90 tablet 3    rosuvastatin (CRESTOR) 20 MG tablet Take 1 tablet (20 mg) by mouth daily 90 tablet 3    SORAfenib (NEXAVAR) 200 MG tablet Take 200 mg by mouth in AM and 400 mg in PM on an empty stomach 1 hour before or 2 hours after a meal. 90 tablet 0    tamsulosin (FLOMAX) 0.4 MG capsule Take 1 capsule (0.4 mg) by mouth daily 90 capsule 3    Vitamin D3 50 mcg (2000 units) tablet Take 1 tablet (50 mcg) by mouth daily 90 tablet 3           Allergies   Allergen Reactions    Codeine Other (See Comments)     Cannot take due to liver  Cannot tolerate oral narcotics    Seasonal Allergies      Sneezing, coughing, runny and itchy eyes         Exam: alert, appears well.  Blood pressure 129/74, pulse 90, temperature 97.9  F (36.6  C), temperature source Oral, resp. rate 18, weight 78 kg (172 lb), SpO2 100%.  Wt Readings from Last 4 Encounters:   10/31/23 79.4 kg (175 lb 1.6 oz)   10/30/23 79.8 kg (175 lb 14.4 oz)   10/26/23 78.5 kg (173 lb)   10/20/23 77.1 kg (170 lb)   Oropharynx is moist and without lesion. Neck supple and without adenopathy. Lungs:CTA. Heart: RRR, no murmur or rub. Abdomen: soft, nontender, BS active, no masses or organomegaly.  Extremities: warm, closed blisters on the pad of the feet bilaterally.          I have reviewed the following labs and imaging.    Labs:    Latest Reference Range & Units 11/10/23 13:36   Sodium 135 - 145 mmol/L 141   Potassium 3.4 - 5.3 mmol/L 4.5   Chloride 98 - 107 mmol/L 106   Carbon Dioxide (CO2) 22 - 29 mmol/L 24   Urea Nitrogen 8.0 - 23.0 mg/dL 19.8   Creatinine 0.67 - 1.17 mg/dL 1.23 (H)   GFR Estimate >60 mL/min/1.73m2 68   Calcium 8.6 - 10.0 mg/dL 7.6 (L)   Anion Gap 7 - 15 mmol/L 11   Magnesium 1.7 - 2.3 mg/dL 1.3 (L)   Phosphorus 2.5 - 4.5 mg/dL 2.4 (L)   Albumin 3.5 - 5.2 g/dL 3.9   Protein Total 6.4 - 8.3 g/dL 6.5   Alkaline Phosphatase 40 - 129 U/L 107   ALT 0 - 70 U/L 28   AST 0 - 45 U/L 33   Bilirubin Total <=1.2 mg/dL 0.5   Glucose 70 - 99 mg/dL 243 (H)   WBC 4.0 - 11.0 10e3/uL 5.5   Hemoglobin 13.3 - 17.7 g/dL 9.6 (L)   Hematocrit 40.0 - 53.0 % 30.5 (L)   Platelet Count 150 - 450 10e3/uL 100 (L)   RBC Count 4.40 - 5.90 10e6/uL 3.05 (L)   MCV 78 - 100 fL 100   MCH 26.5 - 33.0 pg 31.5   MCHC 31.5 - 36.5 g/dL 31.5   RDW 10.0 - 15.0 % 15.1 (H)   % Neutrophils % 88   % Lymphocytes % 8   % Monocytes % 3   % Eosinophils % 0   % Basophils % 0   Absolute Basophils 0.0 - 0.2 10e3/uL 0.0   Absolute  "Eosinophils 0.0 - 0.7 10e3/uL 0.0   Absolute Immature Granulocytes <=0.4 10e3/uL 0.0   Absolute Lymphocytes 0.8 - 5.3 10e3/uL 0.4 (L)   Absolute Monocytes 0.0 - 1.3 10e3/uL 0.2   % Immature Granulocytes % 1   Absolute Neutrophils 1.6 - 8.3 10e3/uL 4.8   Absolute NRBCs 10e3/uL 0.0   NRBCs per 100 WBC <1 /100 0   (H): Data is abnormally high  (L): Data is abnormally low    Imaging:   Narrative & Impression   ULTRASOUND LOWER EXTREMITY VENOUS DUPLEX RIGHT 11/13/2023 9:14 AM     CLINICAL HISTORY: HCC (hepatocellular carcinoma) (H); Swelling of  limb; Calf pain.      COMPARISONS: Ultrasound lower extremity venous duplex bilateral  10/11/2023     REFERRING PROVIDER: RYDER MOORE     TECHNIQUE: Grayscale, color Doppler, Doppler waveform ultrasound  evaluation was performed through the right common femoral, femoral,  and popliteal veins. Right posterior tibial and peroneal veins were  evaluated with grayscale imaging and compression.     Left common femoral vein was evaluated for symmetry.     FINDINGS: Left common femoral vein is patent, fully compressible, and  demonstrates normal phasic Doppler waveform.     Right common femoral, femoral, and popliteal veins are fully  compressible, patent, and demonstrate normal phasic Doppler waveforms.     Right posterior tibial veins are fully compressible to the ankle.     Right peroneal veins are fully compressible to the distal calf.                                                                      IMPRESSION: No deep venous thrombosis demonstrated in the RIGHT leg.     Reference: \"Duplex Ultrasound in the Diagnosis of Lower-Extremity Deep  Venous Thrombosis\"- Charlene Esteban MD, S; Julian Zhou MD  (Circulation. 2014;129:917-921. http://circ.ahajournals.org)     I have personally reviewed the examination and initial interpretation  and I agree with the findings.     MIGUEL ANGEL TURPIN MD        Impression/plan:     Recurrent HCC  -he is having increased hand/foot " syndrome, even with the dose reduced 200mg/400 mg dosing   -the degree to which he is experiencing the hand/foot is moderate, but not needing to stop sorafenib completely  -also noting increased diarrhea as well  -recommend decreasing back to 200 mg bid dosing.  -follow-up with Dr. Villarreal as scheduled for restaging in 2 weeks    Hypophos--s/t sorafenib, common side effect   -now normal, continue phos replacement QID     Hypomag--s/t diarrhea  -on MagOx 400 mg daily, improving, but still low, increased to bid x 3 days, then back to 400 mg daily    L peroneal DVT, dx 10/11/23  -on Eliquis, will remain on indefinitely with his malignancy  -no DVT on RLE, pain was related to hand/foot syndrome    Hand/foot syndrome  -reviewed strategies to reduce friction, already discussed with Dr. Gonzalez, will be getting new orthotics, wearing synthetic socks, using iodine dressings. Decrease walking from 39302 steps to 9000 steps/day    Thrombocytopenia, s/t sorafenib, no evidence of HIT on VAHID while hospitalized  -at acceptable levels whie on sorafenib, monitor     Hx of liver transplant  -immunosuppression with MMF, prednisone as per Dr. Banuelos     CAD s/p 2 vessel CABG 2021  HTN  CKD  -following with  and Dr. Bhatt  -on metoprolol, recently started on nifedipine 60 mg bid  -BP mildly elevated, continues to monitor    RSV-recently dx  Clinically stable, still has a cough, but not worsening  Continues to use albuterol prn

## 2023-11-20 ENCOUNTER — TELEPHONE (OUTPATIENT)
Dept: PHARMACY | Facility: CLINIC | Age: 58
End: 2023-11-20
Payer: MEDICARE

## 2023-11-20 ENCOUNTER — VIRTUAL VISIT (OUTPATIENT)
Dept: BEHAVIORAL HEALTH | Facility: CLINIC | Age: 58
End: 2023-11-20
Payer: MEDICARE

## 2023-11-20 DIAGNOSIS — E83.42 HYPOMAGNESEMIA: ICD-10-CM

## 2023-11-20 DIAGNOSIS — F31.31 BIPOLAR 1 DISORDER, DEPRESSED, MILD (H): Primary | ICD-10-CM

## 2023-11-20 PROCEDURE — 90834 PSYTX W PT 45 MINUTES: CPT | Mod: 95 | Performed by: PSYCHOLOGIST

## 2023-11-20 RX ORDER — MAGNESIUM OXIDE 400 MG/1
400 TABLET ORAL DAILY
Qty: 30 TABLET | Refills: 0 | Status: SHIPPED | OUTPATIENT
Start: 2023-11-20 | End: 2023-12-08

## 2023-11-20 NOTE — TELEPHONE ENCOUNTER
Clinical Pharmacy Consult:                                                      Transplant Specific:  48 Month Post Transplant Medication Call  Date of Transplant: 11/11/2019  Type of Transplant: liver  First Transplant: yes  History of rejection: no    Immunosuppression Regimen   MMF 500mg qAM & 500mg qPM and Prednisone 5mg qAM  Patient specific goal: Not followed  Pt adherent to lab draws: yes  Scr:   Lab Results   Component Value Date    CR 1.16 05/21/2020     Side effects: None    Prophylactic Medications  Antibacterial:  Completed    Antifungal: Not needed thus far    Antiviral: Valcyte completed     Acid Reducer: Protonix (Pantoprazole)  Scheduled Reviewed Date: up to MD    Thrombosis Prevention: Aspirin 81 mg 2 PO daily  Scheduled Discontinue Date: managed by clinic    Blood Pressure Management  Frequency of home Blood Pressure checks: not checking  Most recent home BP:  recent clinic 129/74  Patient Blood pressure goal: <140/90  Patient blood pressure at goal:  Yes    Hospitalizations/ER visits since last assessment: 0    Med rec/DUR performed: yes  Med Rec Discrepancies: no        11/18/2022     1:00 PM   Medication adherence flowsheet   Patient medication administration: Has assistance with medications   Patient estimated adherence level: %   Pharmacist assessment of adherence: Good   Patient reported doses missed per week: 0-1   Facilitators to medication adherence  Medication dosing chart;Pill box;Schedule/routine;Cell phone;Caregiver assistance          11/18/2022     1:00 PM   Medication access flowsheet   Number of pharmacies used: 1   Pharmacy: [facility]   Enrolled in [facility]? Yes      Spoke with Miller today. He is feeling great and very happy with everything. He does have a very positive outlook on life. 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 He had to do 10 hours of dialysis but since then his kidney has been fine. He is currently living on his own after his  wife passed away, but will have a significant other moving in with him next month. He denied missing any doses or having any side effects from his transplant medication. He is also on oral chemo and is also doing very well on it.    BP: not checking but clinic BP have been at goal.    No other questions or concerns. Follow up in  1 year.    Héctor Art, Pharm D  [facility]

## 2023-11-20 NOTE — PROGRESS NOTES
[facility]: Integrated Behavioral Health  November 20, 2023          Behavioral Health Clinician Progress Note    Patient Name: Frandy Workman           Service Type: Video visit      Service Location:  Video visit      Session Start Time: 11:00 Session End Time: 11:38      Session Length: 38 - 52      Attendees: Patient    Visit Activities (Refresh list every visit): [facility] Only     Service Modality:  Video Visit:      Provider verified identity through the following two step process.  Patient provided:  Patient photo and Patient is known previously to provider    Telemedicine Visit: The patient's condition can be safely assessed and treated via synchronous audio and visual telemedicine encounter.      Reason for Telemedicine Visit: Services only offered telehealth    Originating Site (Patient Location): Patient's home    Distant Site (Provider Location[place] [facility] [facility] [place]ent:  The patient/guardian has verbally consented to: the potential risks and benefits of telemedicine (video visit) versus in person care; bill my insurance or make self-payment for services provided; and responsibility for payment of non-covered services.     Patient would like the video invitation sent by:  My Chart    Mode of Communication:  Video Conference via [facility]    Distant Location (Provider):  On-site    As the provider I attest to compliance with applicable laws and regulations related to telemedicine.      Diagnostic Assessment Date:  12/03/2020  Treatment Plan Review Date:  11/4/2023  See Flowsheets for today's PHQ-9 and LIZZETTE-7 results  Previous PHQ-9:       8/3/2023     9:13 AM 9/20/2023     2:45 PM 11/2/2023    11:01 AM   PHQ-9 SCORE   PHQ-9 Total Score MyChart 3 (Minimal depression) 5 (Mild depression) 2 (Minimal depression)   PHQ-9 Total Score 3 5 2     Previous LIZZETTE-7:       4/7/2021    12:34 PM 9/30/2021     1:45 PM 5/17/2023     3:37 PM   LIZZETTE-7 SCORE   Total Score 9 (mild  anxiety)     Total Score 9 10 6      11/7/2017  5:23 AM 3/22/2022  1:49 PM 3/21/2023  1:43 PM 6/22/2023  9:09 AM   PROMIS-10 Scores Only       Global Mental Health Score 14  9  9  13    Global Physical Health Score 13  10  9  13    PROMIS TOTAL - SUBSCORES 27  19  18  26       9/14/2023  9:45 AM   PROMIS-10 Scores Only    Global Mental Health Score 15    Global Physical Health Score 12    PROMIS TOTAL - SUBSCORES 27            Extended Session (60+ minutes): No  Interactive Complexity: No  Crisis: No    Treatment Objective(s) Addressed in This Session:  Target Behavior(s): disease management/lifestyle changes   related to adaptive approaches to managing anxious distress and mood difficulties    Depressed mood: Increase interest, pleasure in doing things.      Grief management      Current Stressors / Issues:  [facility] met with Miller today for a follow-up, to help Miller continue to feel supported in his health behavior change and overall mental health.      Facilitated discussion today about his efforts to process grief/loss more effectively. Discussed a few myths about grief and looked at adaptive ways of processing losses effectively. Discussion focused on Miller's report of past relationship hurts and traumas. Reports having good support currently from his social [place], including his friend Art. Reviewed a few behavioral things he could continue to do to maintain positive mood, like walking and prayer each night.     Progress on Treatment Objective(s) / Homework:  Stable - ACTION (Actively working towards change); Intervened by reinforcing change plan / affirming steps taken      Solution-Focused Therapy  Explore patterns in patient's relationships and discuss options for new behaviors.  Explore patterns in patient's actions and choices and discuss options for new behaviors.    Behavioral Activation  Discuss steps patient can take to become more involved in meaningful activity.  Identify barriers to these activities and  explore possible solutions.      Answers for HPI/ROS submitted by the patient on 3/21/2022  If you checked off any problems, how difficult have these problems made it for you to do your work, take care of things at home, or get along with other people?: Not difficult at all  PHQ9 TOTAL SCORE: 6      Answers for HPI/ROS submitted by the patient on 2/8/2022  If you checked off any problems, how difficult have these problems made it for you to do your work, take care of things at home, or get along with other people?: Somewhat difficult  PHQ9 TOTAL SCORE: 4      Answers for HPI/ROS submitted by the patient on 4/7/2021   LIZZETTE 7 TOTAL SCORE: 9  If you checked off any problems, how difficult have these problems made it for you to do your work, take care of things at home, or get along with other people?: Very difficult  PHQ9 TOTAL SCORE: 7*    *No SI    Answers for HPI/ROS submitted by the patient on 10/13/2020   If you checked off any problems, how difficult have these problems made it for you to do your work, take care of things at home, or get along with other people?: Somewhat difficult  PHQ9 TOTAL SCORE: 8*  LIZZETTE 7 TOTAL SCORE: 6      *No SI     Answers for HPI/ROS submitted by the patient on 12/29/2020   If you checked off any problems, how difficult have these problems made it for you to do your work, take care of things at home, or get along with other people?: Somewhat difficult  PHQ9 TOTAL SCORE: 5*  LIZZETTE 7 TOTAL SCORE: 8    *No SI     Answers for HPI/ROS submitted by the patient on 11/21/2022  If you checked off any problems, how difficult have these problems made it for you to do your work, take care of things at home, or get along with other people?: Very difficult  PHQ9 TOTAL SCORE: 4          Care Plan review completed: no    Medication Review:  No changes to current psychiatric medication(s)     Reports discontinuing zolpidem.       Current Outpatient Medications   Medication    albuterol (PROAIR  HFA/PROVENTIL HFA/VENTOLIN HFA) 108 (90 Base) MCG/ACT inhaler    Alcohol Swabs (ALCOHOL PREP) 70 % PADS    ammonium lactate (AMLACTIN) 12 % external cream    apixaban ANTICOAGULANT (ELIQUIS) 5 MG tablet    BD VIKTORIA U/F 32G X 4 MM insulin pen needle    benzoyl peroxide 5 % external liquid    blood glucose (NO BRAND SPECIFIED) lancets standard    Continuous Blood Gluc Sensor (FREESTYLE CLAUDIA 2 SENSOR) MISC    insulin aspart (NOVOLOG PEN) 100 UNIT/ML pen    insulin degludec (TRESIBA FLEXTOUCH) 100 UNIT/ML pen    K-Phos-Neutral 155-852-130 MG tablet    ketoconazole (NIZORAL) 2 % external shampoo    lamiVUDine (EPIVIR) 100 MG tablet    lisinopril (ZESTRIL) 5 MG tablet    loperamide (IMODIUM A-D) 2 MG tablet    magnesium oxide (MAG-OX) 400 MG tablet    metoprolol succinate ER (TOPROL XL) 25 MG 24 hr tablet    Multiple Vitamin (TAB-A-GLADIS) TABS    mycophenolate (GENERIC EQUIVALENT) 250 MG capsule    NIFEdipine ER OSMOTIC (PROCARDIA XL) 60 MG 24 hr tablet    ondansetron (ZOFRAN ODT) 8 MG ODT tab    pantoprazole (PROTONIX) 40 MG EC tablet    predniSONE (DELTASONE) 5 MG tablet    rosuvastatin (CRESTOR) 20 MG tablet    SORAfenib (NEXAVAR) 200 MG tablet    tamsulosin (FLOMAX) 0.4 MG capsule    Vitamin D3 50 mcg (2000 units) tablet     Current Facility-Administered Medications   Medication    aflibercept (EYLEA) injection prefilled syringe 2 mg    aflibercept (EYLEA) injection prefilled syringe 2 mg    dexamethasone (OZURDEX) intravitreal implant 0.7 mg    dexamethasone (OZURDEX) intravitreal implant 0.7 mg    faricimab-svoa (VABYSMO) intravitreal injection 6 mg    faricimab-svoa (VABYSMO) intravitreal injection 6 mg    lidocaine (PF) (XYLOCAINE) injection 1 mL       Medication Compliance:  Yes    Changes in Health Issues:   None reported    Chemical Use Review:   Substance Use: Chemical use reviewed, no active concerns identified      Tobacco Use: No current tobacco use.         Assessment: Current Emotional / Mental Status  (status of significant symptoms):  Risk status (Self / Other harm or suicidal ideation)  Patient denies a history of suicidal ideation, suicide attempts, self-injurious behavior, homicidal ideation, homicidal behavior and and other safety concerns     Patient denies current fears or concerns for personal safety.  Patient denies current or recent suicidal ideation or behaviors.  Patient denies current or recent homicidal ideation or behaviors.  Patient denies current or recent self injurious behavior or ideation.  Patient denies other safety concerns.     A safety and risk management plan has not been developed at this time, however patient was encouraged to call [place] [phone] should there be a change in any of these risk factors.    [place] Suicide Severity Rating Scale (Short Version)      7/1/2020    11:00 AM 7/12/2021     2:30 PM 1/10/2022     9:28 PM 1/3/2023     6:31 AM 1/24/2023     6:22 AM 7/13/2023    10:31 AM 8/10/2023     3:31 PM   [place] Suicide Severity Rating (Short Version)   Over the past 2 weeks have you felt down, depressed, or hopeless? no no no no no no no   Over the past 2 weeks have you had thoughts of killing yourself? no no no no  no no   Have you ever attempted to kill yourself? no no no no  no no   Q1 Wished to be Dead (Past Month)    no      Q2 Suicidal Thoughts (Past Month)    no      Q3 Suicidal Thought Method    no      Q4 Suicidal Intent without Specific Plan    no      Q5 Suicide Intent with Specific Plan    no      Q6 Suicide Behavior (Lifetime)    no      Level of Risk per Screen    low risk            Appearance:   Appropriate   Eye Contact:   Good   Psychomotor Behavior: TD evident - improving  Attitude:   Cooperative  Interested Friendly Pleasant  Orientation:   All  Speech   Rate / Production: Normal/ Responsive   Volume:  Normal   Mood:    Depressed ; improving  Affect:    Appropriate    Thought Content:  Clear   Thought Form:  Goal Directed  Logical   Insight:    Good      Diagnoses:  1. Bipolar 1 disorder, depressed, mild (H)            Collateral Reports Completed:  Not Applicable    Plan: (Homework, other):  Continue with this writer for individual psychotherapy in 2/3 wks. He will continue to work on managing depression and anxiety through achievable behavioral activation ideas.Information about grief management given today.  No CD issues.     Joseph Murillo Psy.D, LP   Behavioral Health Clinician   [facility]          ______________________________________________________________________                                            Individual Treatment Plan    Patient's Name: Frandy Workman  YOB: 1965    Date of Creation: 3/1/2022  Date Treatment Plan Last Reviewed/Revised: 11/3/2023    DSM5 Diagnoses: 296.51 Bipolar I Disorder Current or Most Recent Episode Depressed, Mild or 300.02 (F41.1) Generalized Anxiety Disorder  Psychosocial / Contextual Factors: Post Solid Organ Tx  PROMIS (reviewed every 90 days):     11/7/2017  5:23 AM 3/22/2022  1:49 PM 3/21/2023  1:43 PM 6/22/2023  9:09 AM   PROMIS-10 Scores Only       Global Mental Health Score 14  9  9  13    Global Physical Health Score 13  10  9  13    PROMIS TOTAL - SUBSCORES 27  19  18  26       9/14/2023  9:45 AM   PROMIS-10 Scores Only    Global Mental Health Score 15    Global Physical Health Score 12    PROMIS TOTAL - SUBSCORES 27          Referral / Collaboration:  Referral to another professional/service is not indicated at this time..    Anticipated number of session for this episode of care: 4-6  Anticipation frequency of session: Every other week  Anticipated Duration of each session: 38-52 minutes  Treatment plan will be reviewed in 90 days or when goals have been changed.       MeasurableTreatment Goal(s) related to diagnosis / functional impairment(s)  Goal 1: Patient will experience a reduction in depressive symptoms along with a corresponding increase in positive  "emotion and life satisfaction.      Objective #A (Patient Action)    Patient will Increase interest, engagement, and pleasure in doing things.  Status: Continued - Date(s): 11/3/2023    Intervention(s)  Therapist will help patient identify pleasant and mastery oriented events that elicit positive, relaxed mood.    Objective #B  Patient will Decrease frequency and intensity of feeling down, depressed, hopeless.  Status: Continued - Date(s): 11/3/2023    Intervention(s)  Therapist will introduce patient to cognitive-behavioral and acceptance and commitment therapy topics aimed to help reduce depression and anxiety    Objective #C  Patient will Identify negative self-talk and behaviors: challenge core beliefs, myths, and actions  Improve concentration, focus, and mindfulness in daily activities .  Status: Continued - Date(s): COMPLETE    Intervention(s)  Therapist will help patient identify and manage negative self-talk and automatic thoughts; introduce patient to cognitive distortions; help patient develop cognitive diffusion techniques      Goal 2: Patient will experience a reduction in anxious symptoms, along with a corresponding increase in relaxed emotional states and life satisfaction.      Objective #A (Patient Action)  Patient will use cognitive-behavioral and thought diffusion strategies identified in therapy to challenge anxious thoughts.    Status: Continued - Date(s): COMPLETE    Intervention(s)  Therapist will utilize CBT and ACT ideas to help patient challenge anxious thoughts and reduce intensity/duration of anxious distress    Objective #B  Patient will use \"worry time\" each day for 15 minutes of scheduled worry and then defer obsessive or anxious thinking until the next structured worry time.    Status: Continued - Date(s): COMPLETE    Intervention(s)  Therapist will teach patient how to effectively utilize worry time and/or thought logs/journals each day and incorporate more relaxation behaviors into " their routine.    Objective #C  Patient will identify the stressors which contribute to feelings of anxiety  Patient will increase engagement in adaptive coping skills and recreational activities, such as exercise and healthy socialization, to manage distress.  Status: Continued - Date(s): COMPLETE    Intervention(s)  Therapist will help patient identify triggers/situational factors that contribute to anxiety and behavioral skills aimed to manage anxious distress.      Goal 3: Patiient will work toward adaptively managing bipolar-related depression and manic episodes      Objective #A (Patient Action)    Status: Continued - Date(s): COMPLETE    Patient will identify 2-3 signs or signals of emerging mood instability.    Intervention(s)  Therapist will provide educational materials on bipolar disorder and help identifying triggers for mood instability .    Objective #B  Patient will identify 2-3 strategies for more effectively managing Bipolar Disorder.    Status: Continued - Date(s): COMPLETE    Intervention(s)  Therapist will teach emotional recognition/identification. Skills aimed to effectively manage bipolar disorder .      Other Possible Therapeutic Intervention(s):    Psycho-education regarding mental health diagnoses and treatment options    Supportive Therapy  Provide affirmations, reflections, and establish working rapport  Emphasize and reflect on strength of therapeutic relationship    Skills training  Explore skills useful to client in current situation.  Skills include assertiveness, communication, conflict management, problem-solving, relaxation, etc.    Solution-Focused Therapy  Explore patterns in patient's relationships and discuss options for new behaviors.  Explore patterns in patient's actions and choices and discuss options for new behaviors.    Cognitive-behavioral Therapy  Discuss common cognitive distortions, identify them in patient's life.  Explore ways to challenge, replace, and act against  these cognitions.    Acceptance and Commitment Therapy  Explore and identify important values in patient's life.  Discuss ways to commit to behavioral activation around these values.    Psychodynamic psychotherapy  Discuss patient's emotional dynamics and issues and how they impact behaviors.  Explore patient's history of relationships and how they impact present behaviors.  Explore how to work with and make changes in these schemas and patterns.    Narrative Therapy  Explore the patient's story of his/her life from his/her perspective.  Explore alternate ways of understanding their experience, identifying exceptions, developing new themes.    Interpersonal Psychotherapy  Explore patterns in relationships that are effective or ineffective at helping patient reach their goals, find satisfying experience.  Discuss new patterns or behaviors to engage in for improved social functioning.    Behavioral Activation  Discuss steps patient can take to become more involved in meaningful activity.  Identify barriers to these activities and explore possible solutions.    Mindfulness-Based Strategies  Discuss skills based on development and application of mindfulness.  Skills drawn from compassion-focused therapy, dialectical behavior therapy, mindfulness-based stress reduction, mindfulness-based cognitive therapy, etc.      Patient has reviewed and agreed to the above plan.      Joseph Murillo Psy.D, LP   Behavioral Health Clinician   [facility]     11/3/2023    Answers for HPI/ROS submitted by the patient on 2/28/2023  If you checked off any problems, how difficult have these problems made it for you to do your work, take care of things at home, or get along with other people?: Very difficult  PHQ9 TOTAL SCORE: 6    Answers for HPI/ROS submitted by the patient on 6/28/2023  If you checked off any problems, how difficult have these problems made it for you to do your work, take care of things at  home, or get along with other people?: Somewhat difficult  PHQ9 TOTAL SCORE: 3Answers submitted by the patient for this visit:  Patient Health Questionnaire (Submitted on 9/20/2023)  If you checked off any problems, how difficult have these problems made it for you to do your work, take care of things at home, or get along with other people?: Somewhat difficult  PHQ9 TOTAL SCORE: 5  Answers submitted by the patient for this visit:  Patient Health Questionnaire (Submitted on 11/2/2023)  If you checked off any problems, how difficult have these problems made it for you to do your work, take care of things at home, or get along with other people?: Somewhat difficult  PHQ9 TOTAL SCORE: 2

## 2023-11-21 ENCOUNTER — TELEPHONE (OUTPATIENT)
Dept: ONCOLOGY | Facility: CLINIC | Age: 58
End: 2023-11-21
Payer: MEDICARE

## 2023-11-21 NOTE — TELEPHONE ENCOUNTER
I had the pleasure of speaking to Miller. Verified with Miller Nexavar dose reduction to 200mg twice daily. Pt reports his diarrhea has improved from liquid to a soft consistency. Pt has reduced imodium to just 3 tablets daily. His musculoskeletal pain has improved since seeing a chiropractor. HFS symptoms: have greatly improved. Pt using medicated iodine. Blisters on his feet are healing and pt can walk normally with minimal pain. Pt will see orthopedics on 11/27 and will be fitted for new orthotics.   Pt pleased that side effects have improved since Nexavar dose decrease. Pt will continue at this dose. Next office visit 11/30 with Dr. Villarreal.    Meka Miguel PharmD  Hematology/Oncology Clinical Pharmacist  [facility] [place]  [phone]

## 2023-11-22 ENCOUNTER — OFFICE VISIT (OUTPATIENT)
Dept: OPHTHALMOLOGY | Facility: CLINIC | Age: 58
End: 2023-11-22
Attending: OPHTHALMOLOGY
Payer: MEDICARE

## 2023-11-22 DIAGNOSIS — E11.3593 PROLIFERATIVE DIABETIC RETINOPATHY OF BOTH EYES ASSOCIATED WITH TYPE 2 DIABETES MELLITUS, UNSPECIFIED PROLIFERATIVE RETINOPATHY TYPE (H): ICD-10-CM

## 2023-11-22 PROCEDURE — 250N000011 HC RX IP 250 OP 636: Mod: JZ | Performed by: OPHTHALMOLOGY

## 2023-11-22 PROCEDURE — 67028 INJECTION EYE DRUG: CPT | Mod: RT | Performed by: OPHTHALMOLOGY

## 2023-11-22 PROCEDURE — 92134 CPTRZ OPH DX IMG PST SGM RTA: CPT | Performed by: OPHTHALMOLOGY

## 2023-11-22 PROCEDURE — 99207 PR DROP WITH A PROCEDURE: CPT | Performed by: OPHTHALMOLOGY

## 2023-11-22 RX ADMIN — FARICIMAB 6 MG: 6 INJECTION, SOLUTION INTRAVITREAL at 10:44

## 2023-11-22 ASSESSMENT — REFRACTION_WEARINGRX
OD_CYLINDER: +0.50
OS_SPHERE: -0.50
OD_AXIS: 180
OS_CYLINDER: +0.25
OS_AXIS: 080
OD_ADD: +2.75
OS_ADD: +2.75
OD_SPHERE: -0.50

## 2023-11-22 ASSESSMENT — VISUAL ACUITY
OD_CC+: -2
OS_CC+: +2
CORRECTION_TYPE: GLASSES
OD_CC: 20/20
OS_CC: 20/20
METHOD: SNELLEN - LINEAR

## 2023-11-22 ASSESSMENT — TONOMETRY
IOP_METHOD: TONOPEN
OD_IOP_MMHG: 17
OS_IOP_MMHG: 20

## 2023-11-22 ASSESSMENT — SLIT LAMP EXAM - LIDS
COMMENTS: NORMAL
COMMENTS: SMALL PAPILLOMA ALONG LL MARGIN, NON CONCERNING

## 2023-11-22 ASSESSMENT — EXTERNAL EXAM - RIGHT EYE: OD_EXAM: NORMAL

## 2023-11-22 ASSESSMENT — EXTERNAL EXAM - LEFT EYE: OS_EXAM: PERIOCULAR ECCHYMOSIS

## 2023-11-22 NOTE — NURSING NOTE
"Chief Complaints and History of Present Illnesses   Patient presents with    Follow Up     7 week follow up     Diabetic Retinopathy Follow Up     7 week follow up      Chief Complaint(s) and History of Present Illness(es)       Follow Up              Comments: 7 week follow up               Diabetic Retinopathy Follow Up              Comments: 7 week follow up               Comments    Pt states vision is \"not bad', but pt feels that vision in LE is not as clear as it used to be. No flashes or floaters. No redness. Some dryness in each eye, relief with drops.  DM2 BS: 120 this morning per pt.  Lab Results       Component                Value               Date                       A1C                      6.3                 06/14/2023                 A1C                      6.9                 12/14/2022                 A1C                      6.0                 01/10/2022                 A1C                      6.8                 07/13/2021                 A1C                      6.0                 12/30/2020                 A1C                      6.3                 06/13/2020                 A1C                      6.6                 08/08/2018                 A1C                      6.5                 06/09/2017                 A1C                      7.8                 10/25/2016              LEX Arceo November 22, 2023 9:15 AM                         "

## 2023-11-24 ENCOUNTER — DOCUMENTATION ONLY (OUTPATIENT)
Dept: FAMILY MEDICINE | Facility: CLINIC | Age: 58
End: 2023-11-24

## 2023-11-24 DIAGNOSIS — E11.3293 TYPE 2 DIABETES MELLITUS WITH MILD NONPROLIFERATIVE RETINOPATHY OF BOTH EYES WITHOUT MACULAR EDEMA, UNSPECIFIED WHETHER LONG TERM INSULIN USE (H): Primary | ICD-10-CM

## 2023-11-24 NOTE — PROGRESS NOTES
Medicare requires that the MD/DO treating the patient for diabetes answers all of the questions and signs the pended order for the patient to qualify for diabetic shoes. Once the order is completed, please route the encounter back to sender.      Please answer the 4 questions required for Insurance billing requirements  Please let me know when the SMN complete so I may document     Thank you very much         Maris Cristina

## 2023-11-27 ENCOUNTER — OFFICE VISIT (OUTPATIENT)
Dept: ORTHOPEDICS | Facility: CLINIC | Age: 58
End: 2023-11-27
Payer: MEDICARE

## 2023-11-27 DIAGNOSIS — S90.821S BLISTER OF RIGHT FOOT, SEQUELA: ICD-10-CM

## 2023-11-27 DIAGNOSIS — S90.822S BLISTER OF LEFT FOOT, SEQUELA: ICD-10-CM

## 2023-11-27 DIAGNOSIS — E11.51 DIABETES MELLITUS WITH PERIPHERAL VASCULAR DISEASE (H): ICD-10-CM

## 2023-11-27 DIAGNOSIS — E11.49 TYPE II OR UNSPECIFIED TYPE DIABETES MELLITUS WITH NEUROLOGICAL MANIFESTATIONS, NOT STATED AS UNCONTROLLED(250.60) (H): Primary | ICD-10-CM

## 2023-11-27 PROBLEM — Z85.828 HISTORY OF NONMELANOMA SKIN CANCER: Status: ACTIVE | Noted: 2023-11-27

## 2023-11-27 PROBLEM — D49.2 NEOPLASM OF UNSPECIFIED BEHAVIOR OF BONE, SOFT TISSUE, AND SKIN: Status: ACTIVE | Noted: 2023-11-27

## 2023-11-27 PROCEDURE — 99214 OFFICE O/P EST MOD 30 MIN: CPT | Performed by: PODIATRIST

## 2023-11-27 NOTE — LETTER
11/27/2023         RE: Frandy Workman  [address] [address]        Dear Colleague,    Thank you for referring your patient, Frandy Workman, to the [facility]. Please see a copy of my visit note below.    Past Medical History:   Diagnosis Date     Anemia 2013     Arthritis      BPH (benign prostatic hyperplasia)      CAD (coronary artery disease) 04/01/2019     Cholelithiasis      Conductive hearing loss 08/16/2017     Depressive disorder 1986    Suffer effects throughout life     Gastroesophageal reflux disease 12/01/2014     HCC (hepatocellular carcinoma) (H) 01/22/2019     History of blood transfusion 2019    Had blood transfussion until Dec 2022     History of diabetic retinopathy 07/2018     HTN (hypertension) 11/20/2019     Hyperlipidemia      Liver cirrhosis secondary to ESTRADA (H)      Liver transplanted (H) 11/11/2019     Portal vein thrombosis 04/11/2019     Type II diabetes mellitus (H)      Patient Active Problem List   Diagnosis     Bipolar affective disorder in remission (H24)     Esophageal varices determined by endoscopy (H)     Erectile dysfunction due to diseases classified elsewhere     HCC (hepatocellular carcinoma) (H)     Equivocal stress echocardiogram     Type 2 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, unspecified whether long term insulin use (H)     Status post coronary angiogram     CAD (coronary artery disease)     Liver transplant recipient (H)     Status post liver transplantation (H)     Immunosuppressed status (H24)     Benign essential hypertension     Chronic kidney disease, stage 3a (H)     Anemia of chronic renal failure, stage 3a (H)     History of coronary artery disease     Dyslipidemia     Excessive sweating     Stage 3b chronic kidney disease (H)     Anemia of chronic renal failure, stage 3b (H)     NSTEMI (non-ST elevated myocardial infarction) (H)     Chest pain,  unspecified type     Hypertensive chronic kidney disease with stage 5 chronic kidney disease or end stage renal disease (H)     Vitreous hemorrhage of left eye (H)     Proliferative diabetic retinopathy of both eyes associated with type 2 diabetes mellitus, unspecified proliferative retinopathy type (H)     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)     Liver replaced by transplant (H)     Hyperkalemia     Acute renal failure, unspecified acute renal failure type (H24)     ARIELA (obstructive sleep apnea)     Past Surgical History:   Procedure Laterality Date     ABDOMEN SURGERY  11/11/2019    Had Liver Transplant     BIOPSY  12/2022    Had biopsy done will have additional procedure 2/15/2023     BYPASS GRAFT ARTERY CORONARY N/A 07/14/2021    Procedure: median sternotomy, on cardiopulmonary bypass, CORONARY ARTERY BYPASS GRAFT (CABG) x2 with left greater saphenous vein endoscopic harvest and left internal mammery artery harvest;  Surgeon: Tom Zapata MD;  Location:  OR     CARDIAC SURGERY  7/2021    Heart Graph. and bypass surgery     CHOLECYSTECTOMY  11/11/2022    Removed with Liver Transplant     COLONOSCOPY      2015     COLONOSCOPY N/A 12/06/2019    Procedure: COLONOSCOPY, WITH POLYPECTOMY AND BIOPSY;  Surgeon: Adam Morton MD;  Location:  GI     CV CENTRAL VENOUS CATHETER PLACEMENT N/A 07/12/2021    Procedure: Central Venous Catheter Placement;  Surgeon: Fermin Polanco MD;  Location: [facility] CARDIAC CATH LAB     CV CORONARY ANGIOGRAM N/A 07/12/2021    Procedure: Coronary Angiogram;  Surgeon: Fermin Polanco MD;  Location: [facility] CARDIAC CATH LAB     CV HEART CATHETERIZATION WITH POSSIBLE INTERVENTION N/A 02/26/2019    Procedure: CORS;  Surgeon: Jagdish Hoyt MD;  Location: [facility] CARDIAC CATH LAB     CV INTRA AORTIC BALLOON N/A 07/12/2021    Procedure: Intra Aortic Balloon Pump Insertion;  Surgeon: Fermin Polanco MD;  Location:   HEART CARDIAC CATH LAB     ESOPHAGOSCOPY, GASTROSCOPY, DUODENOSCOPY (EGD), COMBINED N/A 11/17/2016    Procedure: COMBINED ESOPHAGOSCOPY, GASTROSCOPY, DUODENOSCOPY (EGD);  Surgeon: Santi Rosas MD;  Location:  GI     ESOPHAGOSCOPY, GASTROSCOPY, DUODENOSCOPY (EGD), COMBINED N/A 11/17/2017    Procedure: COMBINED ESOPHAGOSCOPY, GASTROSCOPY, DUODENOSCOPY (EGD);  EGD;  Surgeon: Santi Rosas MD;  Location:  GI     ESOPHAGOSCOPY, GASTROSCOPY, DUODENOSCOPY (EGD), COMBINED N/A 12/28/2018    Procedure: EGD;  Surgeon: Santi Rosas MD;  Location:  OR     ESOPHAGOSCOPY, GASTROSCOPY, DUODENOSCOPY (EGD), COMBINED N/A 12/06/2019    Procedure: ESOPHAGOGASTRODUODENOSCOPY, WITH BIOPSY;  Surgeon: Adam Morton MD;  Location:  GI     ESOPHAGOSCOPY, GASTROSCOPY, DUODENOSCOPY (EGD), COMBINED N/A 02/13/2020    Procedure: ESOPHAGOGASTRODUODENOSCOPY (EGD);  Surgeon: Santi Rosas MD;  Location:  GI     EXCISE MASS ABDOMEN N/A 07/13/2023    Procedure: Laparoscopic lysis of adhesions, laparoscopic resection of abdominal mass;  Surgeon: Ruiz Chu MD;  Location:  OR     HEAD & NECK SURGERY      12/2017 at [facility].      IMPLANT GOLD WEIGHT EYELID Right 11/16/2017    Procedure: IMPLANT WEIGHT EYELID;  Right Upper Eyelid Weight, right tarsal strip lower eyelid;  Surgeon: Milana Malave MD;  Location:  OR     IR CHEMO EMBOLIZATION  01/22/2019     KNEE SURGERY Left      ORTHOPEDIC SURGERY       PAROTIDECTOMY, RADICAL NECK DISSECTION Right 11/02/2017    Procedure: PAROTIDECTOMY, RADICAL NECK DISSECTION;  Right Superfacial Parotidectomy , Facial nerve repair. with [facility] facial nerve monitor.;  Surgeon: Asiya Morgan MD;  Location:  OR     PHACOEMULSIFICATION CLEAR CORNEA WITH STANDARD INTRAOCULAR LENS IMPLANT Right 01/24/2023    Procedure: PHACOEMULSIFICATION, COMPLEX CATARACT, WITH INTRAOCULAR LENS IMPLANT WITH TRYPAN RIGHT EYE;  Surgeon: Enriqueta Martin MD;   Location: [facility] OR     PHACOEMULSIFICATION WITH STANDARD INTRAOCULAR LENS IMPLANT Left 2023    Procedure: PHACOEMULSIFICATION, COMPLEX CATARACT, WITH STANDARD INTRAOCULAR LENS IMPLANT INSERTION LEFT EYE;  Surgeon: Enriqueta Martin MD;  Location: [facility] OR     PICC INSERTION Left 2017    4fr SL BioFlo PICC, 44cm in the L basilic vein w/ tip in the low SVC     RETURN LIVER TRANSPLANT N/A 2019    Procedure: Exploratory laparotomy, hematoma evacuation, abdominal washout;  Surgeon: Александр Ramos MD;  Location: [facility] OR     TRANSPLANT LIVER RECIPIENT  DONOR N/A 2019    Procedure: TRANSPLANT, LIVER, RECIPIENT,  DONOR;  Surgeon: Александр Ramos MD;  Location: [facility] OR     VASCULAR SURGERY       Social History     Socioeconomic History     Marital status:      Spouse name: Not on file     Number of children: Not on file     Years of education: Not on file     Highest education level: Bachelor's degree (e.g., BA, AB, BS)   Occupational History     Not on file   Tobacco Use     Smoking status: Former     Types: Dip, chew, snus or snuff     Passive exposure: Past     Smokeless tobacco: Former     Types: Chew     Quit date: 10/31/2017     Tobacco comments:     Quit [place] after Doctor told me in 2017 that if I didn't I would   Vaping Use     Vaping Use: Never used   Substance and Sexual Activity     Alcohol use: No     Comment: quit 1996     Drug use: No     Sexual activity: Not Currently     Partners: Female     Birth control/protection: Condom   Other Topics Concern     Parent/sibling w/ CABG, MI or angioplasty before 65F 55M? No   Social History Narrative    Prior , [facility]     Social Determinants of Health     Financial Resource Strain: Low Risk  (10/24/2023)    Financial Resource Strain      Within the past 12 months, have you or your family members you live with been unable to get utilities (heat, electricity) when it was really needed?: No   Food  Insecurity: Low Risk  (10/24/2023)    Food Insecurity      Within the past 12 months, did you worry that your food would run out before you got money to buy more?: No      Within the past 12 months, did the food you bought just not last and you didn t have money to get more?: No   Transportation Needs: Low Risk  (10/24/2023)    Transportation Needs      Within the past 12 months, has lack of transportation kept you from medical appointments, getting your medicines, non-medical meetings or appointments, work, or from getting things that you need?: No   Physical Activity: Insufficiently Active (10/13/2020)    Exercise Vital Sign      Days of Exercise per Week: 1 day      Minutes of Exercise per Session: 60 min   Stress: Stress Concern Present (10/13/2020)    [employer] [place] [employer] - Occupational Stress Questionnaire      Feeling of Stress : To some extent   Social Connections: Socially Isolated (10/13/2020)    Social Connection and Isolation Panel [NHANES]      Frequency of Communication with Friends and Family: Once a week      Frequency of Social Gatherings with Friends and Family: Once a week      Attends Adventism Services: Never      Active Member of Clubs or Organizations: No      Attends Club or Organization Meetings: Never      Marital Status:    Interpersonal Safety: Low Risk  (10/31/2023)    Interpersonal Safety      Do you feel physically and emotionally safe where you currently live?: Yes      Within the past 12 months, have you been hit, slapped, kicked or otherwise physically hurt by someone?: No      Within the past 12 months, have you been humiliated or emotionally abused in other ways by your partner or ex-partner?: No   Housing Stability: Low Risk  (10/24/2023)    Housing Stability      Do you have housing? : Yes      Are you worried about losing your housing?: No     Family History   Problem Relation Age of Onset     Skin Cancer Mother      Cancer Mother         mastectomy  "    Diabetes Mother          3/2016     Cerebrovascular Disease Mother         Passed away in Feb of this year, 80 years old.     Thyroid Disease Mother      Depression Mother      Breast Cancer Mother      Obesity Mother         280 pounds  from this and complications from D     Pancreatic Cancer Father 60     Prostate Cancer Father         Currently in Remission     Macular Degeneration Father      Glaucoma Father      Skin Cancer Father      Coronary Artery Disease Father         Started in his 70's  at 88 in Octobe2023     Colon Cancer Father      Thyroid Disease Sister      Depression Sister      Asthma Sister      Sleep Apnea Brother      Colorectal Cancer Maternal Grandmother      Cancer Maternal Grandmother      Substance Abuse Maternal Grandmother         Alcohol     Prostate Cancer Maternal Grandfather      Substance Abuse Maternal Grandfather         Alcohol     Colorectal Cancer Paternal Grandmother      Asthma Sister         Had since birth     Liver Disease No family hx of      Melanoma No family hx of      Anesthesia Reaction No family hx of      Deep Vein Thrombosis (DVT) No family hx of        Lab Results   Component Value Date    A1C 6.3 2023    A1C 6.9 2022    A1C 6.0 01/10/2022    A1C 6.8 2021    A1C 6.0 2020    A1C 6.3 2020    A1C 6.6 2018    A1C 6.5 2017    A1C 7.8 10/25/2016       Lab Results   Component Value Date    WBC 4.2 2023    WBC 4.4 2021     Lab Results   Component Value Date    RBC 2.90 2023    RBC 2.35 2021     Lab Results   Component Value Date    HGB 9.1 2023    HGB 7.3 2021     Lab Results   Component Value Date    HCT 29.5 2023    HCT 22.6 2021     No components found for: \"MCT\"  Lab Results   Component Value Date     2023    MCV 96 2021     Lab Results   Component Value Date    MCH 31.4 2023    MCH 31.1 2021     Lab Results   Component Value " "Date    MCHC 30.8 11/14/2023    MCHC 32.3 06/28/2021     Lab Results   Component Value Date    RDW 15.0 11/14/2023    RDW 15.1 06/28/2021     Lab Results   Component Value Date     11/14/2023     06/28/2021   Last Comprehensive Metabolic Panel:  Lab Results   Component Value Date     11/14/2023    POTASSIUM 5.3 11/14/2023    CHLORIDE 104 11/14/2023    CO2 25 11/14/2023    ANIONGAP 9 11/14/2023     (H) 11/14/2023    BUN 22.8 11/14/2023    CR 1.22 (H) 11/14/2023    GFRESTIMATED 68 11/14/2023    DEBORAH 7.9 (L) 11/14/2023       Lab Results   Component Value Date    AST 31 11/14/2023    AST 9 06/28/2021     Lab Results   Component Value Date    ALT 25 11/14/2023    ALT 20 06/28/2021     No results found for: \"BILICONJ\"   Lab Results   Component Value Date    BILITOTAL 0.3 11/14/2023    BILITOTAL 0.5 06/28/2021     Lab Results   Component Value Date    ALBUMIN 4.0 11/14/2023    ALBUMIN 4.3 07/20/2022    ALBUMIN 4.0 06/28/2021     Lab Results   Component Value Date    PROTTOTAL 6.5 11/14/2023    PROTTOTAL 7.4 06/28/2021      Lab Results   Component Value Date    ALKPHOS 104 11/14/2023    ALKPHOS 98 06/28/2021                 Subjective findings- 58-year-old returns clinic for blisters to bilateral MPJs with diabetes with peripheral neuropathy.  Relates he is doing much better he is using the Betadine and is back to walking daily.    Objective findings- DP and PT are 2 out of 4 bilaterally.  Has some loose hyperkeratotic skin on the plantar second MPJ right foot.  Has yuniel blisters bilaterally.  There is no erythema, no drainage, no odor, no calor, no pain on palpation bilaterally.    Assessment and plan- Blistering bilateral plantar MPJs.  Diabetes with peripheral Neuropathy.  Diagnosis and treatment options discussed with the patient.  Loose blistered skin on the right second MPJ was reduced with a tissue cutter upon consent.  Continue the Betadine until the blisters are completely healed.  " Return to clinic and see me in 4 to 6 weeks.  Previous notes reviewed.              Moderate level of medical decision making.      Again, thank you for allowing me to participate in the care of your patient.        Sincerely,        Lamin Gonzalez DPM

## 2023-11-27 NOTE — PROGRESS NOTES
Past Medical History:   Diagnosis Date    Anemia 2013    Arthritis     BPH (benign prostatic hyperplasia)     CAD (coronary artery disease) 04/01/2019    Cholelithiasis     Conductive hearing loss 08/16/2017    Depressive disorder 1986    Suffer effects throughout life    Gastroesophageal reflux disease 12/01/2014    HCC (hepatocellular carcinoma) (H) 01/22/2019    History of blood transfusion 2019    Had blood transfussion until Dec 2022    History of diabetic retinopathy 07/2018    HTN (hypertension) 11/20/2019    Hyperlipidemia     Liver cirrhosis secondary to ESTRADA (H)     Liver transplanted (H) 11/11/2019    Portal vein thrombosis 04/11/2019    Type II diabetes mellitus (H)      Patient Active Problem List   Diagnosis    Bipolar affective disorder in remission (H24)    Esophageal varices determined by endoscopy (H)    Erectile dysfunction due to diseases classified elsewhere    HCC (hepatocellular carcinoma) (H)    Equivocal stress echocardiogram    Type 2 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, unspecified whether long term insulin use (H)    Status post coronary angiogram    CAD (coronary artery disease)    Liver transplant recipient (H)    Status post liver transplantation (H)    Immunosuppressed status (H24)    Benign essential hypertension    Chronic kidney disease, stage 3a (H)    Anemia of chronic renal failure, stage 3a (H)    History of coronary artery disease    Dyslipidemia    Excessive sweating    Stage 3b chronic kidney disease (H)    Anemia of chronic renal failure, stage 3b (H)    NSTEMI (non-ST elevated myocardial infarction) (H)    Chest pain, unspecified type    Hypertensive chronic kidney disease with stage 5 chronic kidney disease or end stage renal disease (H)    Vitreous hemorrhage of left eye (H)    Proliferative diabetic retinopathy of both eyes associated with type 2 diabetes mellitus, unspecified proliferative retinopathy type (H)     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)    Liver replaced by transplant (H)    Hyperkalemia    Acute renal failure, unspecified acute renal failure type (H24)    ARIELA (obstructive sleep apnea)     Past Surgical History:   Procedure Laterality Date    ABDOMEN SURGERY  11/11/2019    Had Liver Transplant    BIOPSY  12/2022    Had biopsy done will have additional procedure 2/15/2023    BYPASS GRAFT ARTERY CORONARY N/A 07/14/2021    Procedure: median sternotomy, on cardiopulmonary bypass, CORONARY ARTERY BYPASS GRAFT (CABG) x2 with left greater saphenous vein endoscopic harvest and left internal mammery artery harvest;  Surgeon: Tom Zapata MD;  Location: [facility] OR    CARDIAC SURGERY  7/2021    Heart Graph. and bypass surgery    CHOLECYSTECTOMY  11/11/2022    Removed with Liver Transplant    COLONOSCOPY      2015    COLONOSCOPY N/A 12/06/2019    Procedure: COLONOSCOPY, WITH POLYPECTOMY AND BIOPSY;  Surgeon: Adam Morton MD;  Location: [facility]    CV CENTRAL VENOUS CATHETER PLACEMENT N/A 07/12/2021    Procedure: Central Venous Catheter Placement;  Surgeon: Fermin Polanco MD;  Location: [facility] CARDIAC CATH LAB    CV CORONARY ANGIOGRAM N/A 07/12/2021    Procedure: Coronary Angiogram;  Surgeon: Fermin Polanco MD;  Location: [facility] CARDIAC CATH LAB    CV HEART CATHETERIZATION WITH POSSIBLE INTERVENTION N/A 02/26/2019    Procedure: CORS;  Surgeon: Jagdish Hoyt MD;  Location: [facility] CARDIAC CATH LAB    CV INTRA AORTIC BALLOON N/A 07/12/2021    Procedure: Intra Aortic Balloon Pump Insertion;  Surgeon: Fermin Polanco MD;  Location: [facility] CARDIAC CATH LAB    ESOPHAGOSCOPY, GASTROSCOPY, DUODENOSCOPY (EGD), COMBINED N/A 11/17/2016    Procedure: COMBINED ESOPHAGOSCOPY, GASTROSCOPY, DUODENOSCOPY (EGD);  Surgeon: Santi Rosas MD;  Location:  GI    ESOPHAGOSCOPY, GASTROSCOPY, DUODENOSCOPY (EGD), COMBINED N/A 11/17/2017    Procedure: COMBINED  ESOPHAGOSCOPY, GASTROSCOPY, DUODENOSCOPY (EGD);  EGD;  Surgeon: Santi Rosas MD;  [facility]    ESOPHAGOSCOPY, GASTROSCOPY, DUODENOSCOPY (EGD), COMBINED N/A 12/28/2018    Procedure: EGD;  Surgeon: Santi Rosas MD;  [facility] OR    ESOPHAGOSCOPY, GASTROSCOPY, DUODENOSCOPY (EGD), COMBINED N/A 12/06/2019    Procedure: ESOPHAGOGASTRODUODENOSCOPY, WITH BIOPSY;  Surgeon: Adam oMrton MD;  Location: [facility] GI    ESOPHAGOSCOPY, GASTROSCOPY, DUODENOSCOPY (EGD), COMBINED N/A 02/13/2020    Procedure: ESOPHAGOGASTRODUODENOSCOPY (EGD);  Surgeon: Santi Rosas MD;  Location:  GI    EXCISE MASS ABDOMEN N/A 07/13/2023    Procedure: Laparoscopic lysis of adhesions, laparoscopic resection of abdominal mass;  Surgeon: Ruiz Chu MD;  Location:  OR    HEAD & NECK SURGERY      12/2017 at [facility].     IMPLANT GOLD WEIGHT EYELID Right 11/16/2017    Procedure: IMPLANT WEIGHT EYELID;  Right Upper Eyelid Weight, right tarsal strip lower eyelid;  Surgeon: Milana Malave MD;  Location:  OR    IR CHEMO EMBOLIZATION  01/22/2019    KNEE SURGERY Left     ORTHOPEDIC SURGERY      PAROTIDECTOMY, RADICAL NECK DISSECTION Right 11/02/2017    Procedure: PAROTIDECTOMY, RADICAL NECK DISSECTION;  Right Superfacial Parotidectomy , Facial nerve repair. with [facility] facial nerve monitor.;  Surgeon: Asiya Morgan MD;  Location: [facility] OR    PHACOEMULSIFICATION CLEAR CORNEA WITH STANDARD INTRAOCULAR LENS IMPLANT Right 01/24/2023    Procedure: PHACOEMULSIFICATION, COMPLEX CATARACT, WITH INTRAOCULAR LENS IMPLANT WITH TRYPAN RIGHT EYE;  Surgeon: Enriqueta Martin MD;  Location: [facility] OR    PHACOEMULSIFICATION WITH STANDARD INTRAOCULAR LENS IMPLANT Left 01/03/2023    Procedure: PHACOEMULSIFICATION, COMPLEX CATARACT, WITH STANDARD INTRAOCULAR LENS IMPLANT INSERTION LEFT EYE;  Surgeon: Enriqueta Martin MD;  Location: [facility] OR    PICC INSERTION Left 11/06/2017    4fr SL BioFlo PICC, 44cm in the L  basilic vein w/ tip in the low SVC    RETURN LIVER TRANSPLANT N/A 2019    Procedure: Exploratory laparotomy, hematoma evacuation, abdominal washout;  Surgeon: Александр Ramos MD;  Location: [facility] OR    TRANSPLANT LIVER RECIPIENT  DONOR N/A 2019    Procedure: TRANSPLANT, LIVER, RECIPIENT,  DONOR;  Surgeon: Александр Ramos MD;  Location: [facility] OR    VASCULAR SURGERY       Social History     Socioeconomic History    Marital status:      Spouse name: Not on file    Number of children: Not on file    Years of education: Not on file    Highest education level: Bachelor's degree (e.g., BA, AB, BS)   Occupational History    Not on file   Tobacco Use    Smoking status: Former     Types: Dip, chew, snus or snuff     Passive exposure: Past    Smokeless tobacco: Former     Types: Chew     Quit date: 10/31/2017    Tobacco comments:     Quit [place] after Doctor told me in  that if I didn't I would   Vaping Use    Vaping Use: Never used   Substance and Sexual Activity    Alcohol use: No     Comment: quit 1996    Drug use: No    Sexual activity: Not Currently     Partners: Female     Birth control/protection: Condom   Other Topics Concern    Parent/sibling w/ CABG, MI or angioplasty before 65F 55M? No   Social History Narrative    Prior [facility], [facility], [facility]     Social Determinants of Health     Financial Resource Strain: Low Risk  (10/24/2023)    Financial Resource Strain     Within the past 12 months, have you or your family members you live with been unable to get utilities (heat, electricity) when it was really needed?: No   Food Insecurity: Low Risk  (10/24/2023)    Food Insecurity     Within the past 12 months, did you worry that your food would run out before you got money to buy more?: No     Within the past 12 months, did the food you bought just not last and you didn t have money to get more?: No   Transportation Needs: Low Risk  (10/24/2023)    Transportation Needs      Within the past 12 months, has lack of transportation kept you from medical appointments, getting your medicines, non-medical meetings or appointments, work, or from getting things that you need?: No   Physical Activity: Insufficiently Active (10/13/2020)    Exercise Vital Sign     Days of Exercise per Week: 1 day     Minutes of Exercise per Session: 60 min   Stress: Stress Concern Present (10/13/2020)    [place] [place] [employer] - Occupational Stress Questionnaire     Feeling of Stress : To some extent   Social Connections: Socially Isolated (10/13/2020)    Social Connection and Isolation Panel [NHANES]     Frequency of Communication with Friends and Family: Once a week     Frequency of Social Gatherings with Friends and Family: Once a week     Attends Mosque Services: Never     Active Member of Clubs or Organizations: No     Attends Club or Organization Meetings: Never     Marital Status:    Interpersonal Safety: Low Risk  (10/31/2023)    Interpersonal Safety     Do you feel physically and emotionally safe where you currently live?: Yes     Within the past 12 months, have you been hit, slapped, kicked or otherwise physically hurt by someone?: No     Within the past 12 months, have you been humiliated or emotionally abused in other ways by your partner or ex-partner?: No   Housing Stability: Low Risk  (10/24/2023)    Housing Stability     Do you have housing? : Yes     Are you worried about losing your housing?: No     Family History   Problem Relation Age of Onset    Skin Cancer Mother     Cancer Mother         mastectomy    Diabetes Mother          3/2016    Cerebrovascular Disease Mother         Passed away in Feb of this year, 80 years old.    Thyroid Disease Mother     Depression Mother     Breast Cancer Mother     Obesity Mother         280 pounds  from this and complications from D    Pancreatic Cancer Father 60    Prostate Cancer Father         Currently in Remission     "Macular Degeneration Father     Glaucoma Father     Skin Cancer Father     Coronary Artery Disease Father         Started in his 70's  at 88 in 2023    Colon Cancer Father     Thyroid Disease Sister     Depression Sister     Asthma Sister     Sleep Apnea Brother     Colorectal Cancer Maternal Grandmother     Cancer Maternal Grandmother     Substance Abuse Maternal Grandmother         Alcohol    Prostate Cancer Maternal Grandfather     Substance Abuse Maternal Grandfather         Alcohol    Colorectal Cancer Paternal Grandmother     Asthma Sister         Had since birth    Liver Disease No family hx of     Melanoma No family hx of     Anesthesia Reaction No family hx of     Deep Vein Thrombosis (DVT) No family hx of        Lab Results   Component Value Date    A1C 6.3 2023    A1C 6.9 2022    A1C 6.0 01/10/2022    A1C 6.8 2021    A1C 6.0 2020    A1C 6.3 2020    A1C 6.6 2018    A1C 6.5 2017    A1C 7.8 10/25/2016       Lab Results   Component Value Date    WBC 4.2 2023    WBC 4.4 2021     Lab Results   Component Value Date    RBC 2.90 2023    RBC 2.35 2021     Lab Results   Component Value Date    HGB 9.1 2023    HGB 7.3 2021     Lab Results   Component Value Date    HCT 29.5 2023    HCT 22.6 2021     No components found for: \"MCT\"  Lab Results   Component Value Date     2023    MCV 96 2021     Lab Results   Component Value Date    MCH 31.4 2023    MCH 31.1 2021     Lab Results   Component Value Date    MCHC 30.8 2023    MCHC 32.3 2021     Lab Results   Component Value Date    RDW 15.0 2023    RDW 15.1 2021     Lab Results   Component Value Date     2023     2021   Last Comprehensive Metabolic Panel:  Lab Results   Component Value Date     2023    POTASSIUM 5.3 2023    CHLORIDE 104 2023    CO2 25 2023    " "ANIONGAP 9 11/14/2023     (H) 11/14/2023    BUN 22.8 11/14/2023    CR 1.22 (H) 11/14/2023    GFRESTIMATED 68 11/14/2023    DEBORAH 7.9 (L) 11/14/2023       Lab Results   Component Value Date    AST 31 11/14/2023    AST 9 06/28/2021     Lab Results   Component Value Date    ALT 25 11/14/2023    ALT 20 06/28/2021     No results found for: \"BILICONJ\"   Lab Results   Component Value Date    BILITOTAL 0.3 11/14/2023    BILITOTAL 0.5 06/28/2021     Lab Results   Component Value Date    ALBUMIN 4.0 11/14/2023    ALBUMIN 4.3 07/20/2022    ALBUMIN 4.0 06/28/2021     Lab Results   Component Value Date    PROTTOTAL 6.5 11/14/2023    PROTTOTAL 7.4 06/28/2021      Lab Results   Component Value Date    ALKPHOS 104 11/14/2023    ALKPHOS 98 06/28/2021                 Subjective findings- 58-year-old returns clinic for blisters to bilateral MPJs with diabetes with peripheral neuropathy.  Relates he is doing much better he is using the Betadine and is back to walking daily.    Objective findings- DP and PT are 2 out of 4 bilaterally.  Has some loose hyperkeratotic skin on the plantar second MPJ right foot.  Has yuniel blisters bilaterally.  There is no erythema, no drainage, no odor, no calor, no pain on palpation bilaterally.    Assessment and plan- Blistering bilateral plantar MPJs.  Diabetes with peripheral Neuropathy.  Diagnosis and treatment options discussed with the patient.  Loose blistered skin on the right second MPJ was reduced with a tissue cutter upon consent.  Continue the Betadine until the blisters are completely healed.  Return to clinic and see me in 4 to 6 weeks.  Previous notes reviewed.              Moderate level of medical decision making.  "

## 2023-11-28 ENCOUNTER — LAB (OUTPATIENT)
Dept: LAB | Facility: CLINIC | Age: 58
End: 2023-11-28
Payer: MEDICARE

## 2023-11-28 ENCOUNTER — ANCILLARY PROCEDURE (OUTPATIENT)
Dept: CT IMAGING | Facility: CLINIC | Age: 58
End: 2023-11-28
Attending: INTERNAL MEDICINE
Payer: MEDICARE

## 2023-11-28 DIAGNOSIS — C22.0 HCC (HEPATOCELLULAR CARCINOMA) (H): ICD-10-CM

## 2023-11-28 DIAGNOSIS — F31.0 BIPOLAR AFFECTIVE DISORDER, CURRENT EPISODE HYPOMANIC (H): ICD-10-CM

## 2023-11-28 DIAGNOSIS — C78.6 MALIGNANT NEOPLASM METASTATIC TO PERITONEUM (H): ICD-10-CM

## 2023-11-28 DIAGNOSIS — Z94.4 LIVER REPLACED BY TRANSPLANT (H): ICD-10-CM

## 2023-11-28 DIAGNOSIS — Z79.899 ENCOUNTER FOR LONG-TERM (CURRENT) USE OF MEDICATIONS: ICD-10-CM

## 2023-11-28 DIAGNOSIS — Z94.4 LIVER TRANSPLANT RECIPIENT (H): ICD-10-CM

## 2023-11-28 DIAGNOSIS — N18.31 CHRONIC KIDNEY DISEASE, STAGE 3A (H): ICD-10-CM

## 2023-11-28 LAB
ALBUMIN SERPL BCG-MCNC: 3.9 G/DL (ref 3.5–5.2)
ALP SERPL-CCNC: 95 U/L (ref 40–150)
ALT SERPL W P-5'-P-CCNC: 21 U/L (ref 0–70)
ANION GAP SERPL CALCULATED.3IONS-SCNC: 8 MMOL/L (ref 7–15)
AST SERPL W P-5'-P-CCNC: 23 U/L (ref 0–45)
BASOPHILS # BLD AUTO: 0 10E3/UL (ref 0–0.2)
BASOPHILS NFR BLD AUTO: 0 %
BILIRUB DIRECT SERPL-MCNC: <0.2 MG/DL (ref 0–0.3)
BILIRUB SERPL-MCNC: 0.3 MG/DL
BUN SERPL-MCNC: 23.2 MG/DL (ref 8–23)
CALCIUM SERPL-MCNC: 7.9 MG/DL (ref 8.6–10)
CHLORIDE SERPL-SCNC: 106 MMOL/L (ref 98–107)
CREAT SERPL-MCNC: 1.23 MG/DL (ref 0.67–1.17)
DEPRECATED HCO3 PLAS-SCNC: 21 MMOL/L (ref 22–29)
EGFRCR SERPLBLD CKD-EPI 2021: 68 ML/MIN/1.73M2
EOSINOPHIL # BLD AUTO: 0 10E3/UL (ref 0–0.7)
EOSINOPHIL NFR BLD AUTO: 1 %
ERYTHROCYTE [DISTWIDTH] IN BLOOD BY AUTOMATED COUNT: 15.4 % (ref 10–15)
GLUCOSE SERPL-MCNC: 116 MG/DL (ref 70–99)
HCT VFR BLD AUTO: 30.3 % (ref 40–53)
HGB BLD-MCNC: 9.7 G/DL (ref 13.3–17.7)
IMM GRANULOCYTES # BLD: 0.1 10E3/UL
IMM GRANULOCYTES NFR BLD: 1 %
LYMPHOCYTES # BLD AUTO: 0.4 10E3/UL (ref 0.8–5.3)
LYMPHOCYTES NFR BLD AUTO: 7 %
MAGNESIUM SERPL-MCNC: 1.4 MG/DL (ref 1.7–2.3)
MCH RBC QN AUTO: 32.7 PG (ref 26.5–33)
MCHC RBC AUTO-ENTMCNC: 32 G/DL (ref 31.5–36.5)
MCV RBC AUTO: 102 FL (ref 78–100)
MONOCYTES # BLD AUTO: 0.3 10E3/UL (ref 0–1.3)
MONOCYTES NFR BLD AUTO: 5 %
NEUTROPHILS # BLD AUTO: 4.7 10E3/UL (ref 1.6–8.3)
NEUTROPHILS NFR BLD AUTO: 86 %
NRBC # BLD AUTO: 0 10E3/UL
NRBC BLD AUTO-RTO: 0 /100
PHOSPHATE SERPL-MCNC: 2.7 MG/DL (ref 2.5–4.5)
PLATELET # BLD AUTO: 138 10E3/UL (ref 150–450)
POTASSIUM SERPL-SCNC: 5.6 MMOL/L (ref 3.4–5.3)
PROT SERPL-MCNC: 6.3 G/DL (ref 6.4–8.3)
RBC # BLD AUTO: 2.97 10E6/UL (ref 4.4–5.9)
SODIUM SERPL-SCNC: 135 MMOL/L (ref 135–145)
WBC # BLD AUTO: 5.5 10E3/UL (ref 4–11)

## 2023-11-28 PROCEDURE — 85025 COMPLETE CBC W/AUTO DIFF WBC: CPT

## 2023-11-28 PROCEDURE — 36415 COLL VENOUS BLD VENIPUNCTURE: CPT

## 2023-11-28 PROCEDURE — 74178 CT ABD&PLV WO CNTR FLWD CNTR: CPT | Mod: MG | Performed by: RADIOLOGY

## 2023-11-28 PROCEDURE — 84450 TRANSFERASE (AST) (SGOT): CPT

## 2023-11-28 PROCEDURE — 84100 ASSAY OF PHOSPHORUS: CPT

## 2023-11-28 PROCEDURE — 71260 CT THORAX DX C+: CPT | Mod: MG | Performed by: RADIOLOGY

## 2023-11-28 PROCEDURE — G1010 CDSM STANSON: HCPCS | Mod: GC | Performed by: RADIOLOGY

## 2023-11-28 PROCEDURE — 82248 BILIRUBIN DIRECT: CPT

## 2023-11-28 PROCEDURE — 83735 ASSAY OF MAGNESIUM: CPT

## 2023-11-28 PROCEDURE — 80051 ELECTROLYTE PANEL: CPT

## 2023-11-28 RX ORDER — SORAFENIB 200 MG/1
TABLET, FILM COATED ORAL
Qty: 120 TABLET | OUTPATIENT
Start: 2023-11-28

## 2023-11-28 RX ORDER — IOPAMIDOL 755 MG/ML
88 INJECTION, SOLUTION INTRAVASCULAR ONCE
Status: COMPLETED | OUTPATIENT
Start: 2023-11-28 | End: 2023-11-28

## 2023-11-28 RX ADMIN — IOPAMIDOL 88 ML: 755 INJECTION, SOLUTION INTRAVASCULAR at 09:35

## 2023-11-28 NOTE — DISCHARGE INSTRUCTIONS

## 2023-11-28 NOTE — NURSING NOTE
Chief Complaint   Patient presents with    Blood Draw     Labs drawn from PIV. Patient tolerated well. PIV removed.     Lea Tucker RN    
Normal vision: sees adequately in most situations; can see medication labels, newsprint

## 2023-11-30 ENCOUNTER — VIRTUAL VISIT (OUTPATIENT)
Dept: ONCOLOGY | Facility: CLINIC | Age: 58
End: 2023-11-30
Attending: INTERNAL MEDICINE
Payer: MEDICARE

## 2023-11-30 ENCOUNTER — LAB (OUTPATIENT)
Dept: LAB | Facility: CLINIC | Age: 58
End: 2023-11-30
Payer: MEDICARE

## 2023-11-30 ENCOUNTER — TELEPHONE (OUTPATIENT)
Dept: TRANSPLANT | Facility: CLINIC | Age: 58
End: 2023-11-30

## 2023-11-30 VITALS
SYSTOLIC BLOOD PRESSURE: 131 MMHG | BODY MASS INDEX: 23.85 KG/M2 | HEIGHT: 69 IN | DIASTOLIC BLOOD PRESSURE: 71 MMHG | WEIGHT: 161 LBS

## 2023-11-30 DIAGNOSIS — C78.6 MALIGNANT NEOPLASM METASTATIC TO PERITONEUM (H): ICD-10-CM

## 2023-11-30 DIAGNOSIS — C22.0 HCC (HEPATOCELLULAR CARCINOMA) (H): Primary | ICD-10-CM

## 2023-11-30 DIAGNOSIS — N18.31 CHRONIC KIDNEY DISEASE, STAGE 3A (H): ICD-10-CM

## 2023-11-30 DIAGNOSIS — F31.0 BIPOLAR AFFECTIVE DISORDER, CURRENT EPISODE HYPOMANIC (H): ICD-10-CM

## 2023-11-30 DIAGNOSIS — E87.5 HYPERKALEMIA: ICD-10-CM

## 2023-11-30 DIAGNOSIS — Z94.4 LIVER TRANSPLANT RECIPIENT (H): ICD-10-CM

## 2023-11-30 DIAGNOSIS — E87.5 HYPERKALEMIA: Primary | ICD-10-CM

## 2023-11-30 LAB — POTASSIUM SERPL-SCNC: 5.3 MMOL/L (ref 3.4–5.3)

## 2023-11-30 PROCEDURE — 84132 ASSAY OF SERUM POTASSIUM: CPT | Performed by: PATHOLOGY

## 2023-11-30 PROCEDURE — 99443 PR PHYSICIAN TELEPHONE EVALUATION 21-30 MIN: CPT | Mod: 95 | Performed by: INTERNAL MEDICINE

## 2023-11-30 PROCEDURE — 36415 COLL VENOUS BLD VENIPUNCTURE: CPT | Performed by: PATHOLOGY

## 2023-11-30 ASSESSMENT — PAIN SCALES - GENERAL: PAINLEVEL: NO PAIN (0)

## 2023-11-30 NOTE — LETTER
11/30/2023         RE: Frandy Workman  [address] [facility] [address]        Miller Workman returns today in follow-up of metastatic hepatocellular carcinoma.    He is 58 years old and was first diagnosed 5 years ago when he had 2 lesions in his liver treated with TACE and subsequently had a liver transplantation in November 2019.  On routine surveillance in June 2023 he was found to have new peritoneal masses which proved to be metastatic hepatocellular carcinoma.  At the end of July he started therapy with sorafenib at 400 mg twice per day complicated by diarrhea and then severe nausea and vomiting that actually resulted in hospitalization with acute renal failure.  Upon recovery he resumed active treatment and just 200 mg twice per day and tolerated that well and went up to 200 alternating with 400 but developed more problems with diarrhea and significant hand-foot toxicity and so now is back to just 200 mg/day.  He returns today for ongoing toxicity management and assessment of his disease status.    He tells me since his dose reduction he is actually doing quite well now.  His hands and feet are healing up.  He is walking quite a bit.  He rarely has diarrhea anymore.  His weight loss is stopped but he has not yet gained back any.  He notes his blood sugars have been running somewhat low lately.  He was having trouble with nosebleeds on his apixaban but he stopped his nasal CPAP and the nosebleeds have stopped and he is not having any other bleeding.    On exam via the video link he appears well and older than his stated age.    I personally reviewed his CT scan and went over the results with him.  His peritoneal nodules that we can see appear stable and I do not see any new sites of disease.    His alpha-fetoprotein as of a couple weeks ago is immeasurably low, down from of level of 60 at the time of his recurrence.  His electrolytes are essentially normal with mild hyperkalemia and his creatinine is  stable at 1.2.  His bilirubin, albumin and liver enzymes are normal.  He has stable macrocytic anemia with a hemoglobin of 9.7 and his platelets are improved at 138.    Assessment/plan: Hepatocellular carcinoma with peritoneal carcinomatosis in the setting of a prior liver transplant.  His disease is stable and he now seems to be having a tolerable level of side effects on 200 mg twice per day of sorafenib.  We will plan on continuing on with that dose and schedule and reevaluate his disease status again in about 2 months.        Miguel Villarreal MD

## 2023-11-30 NOTE — NURSING NOTE
"Patient reviewed medications and allergies in Mychart during e-check in and said everything looked correct.      Is the patient currently in the state of [place]? YES    Visit mode:VIDEO    If the visit is dropped, the patient can be reconnected by: VIDEO VISIT: Text to cell phone:   Telephone Information:   Mobile [phone]       Will anyone else be joining the visit? NO  (If patient encounters technical issues they should call [phone]    How would you like to obtain your AVS? MyChart    Are changes needed to the allergy or medication list? No    Reason for visit: RECHECK (Patient said, \" review blood work that was taken on 11/28/23 and also review CT scan from 11/28/23 and where to go from here, still have RSV which now is over 2 months.\")    Wendy Campbell VVF     "

## 2023-11-30 NOTE — TELEPHONE ENCOUNTER
Spoke to pt and instructed him of the need to repeat his K+ today or tomorrow. Pt plans to repeat today at 2:45p.

## 2023-11-30 NOTE — PROGRESS NOTES
Virtual Visit Details    Type of service:  Video Visit   Video Start Time:  11:30  Video End Time: 12:00    Originating Location (pt. Location): Home    Distant Location (provider location):  Off-site  Platform used for Video Visit: Odette Workman returns today in follow-up of metastatic hepatocellular carcinoma.    He is 58 years old and was first diagnosed 5 years ago when he had 2 lesions in his liver treated with TACE and subsequently had a liver transplantation in November 2019.  On routine surveillance in June 2023 he was found to have new peritoneal masses which proved to be metastatic hepatocellular carcinoma.  At the end of July he started therapy with sorafenib at 400 mg twice per day complicated by diarrhea and then severe nausea and vomiting that actually resulted in hospitalization with acute renal failure.  Upon recovery he resumed active treatment and just 200 mg twice per day and tolerated that well and went up to 200 alternating with 400 but developed more problems with diarrhea and significant hand-foot toxicity and so now is back to just 200 mg/day.  He returns today for ongoing toxicity management and assessment of his disease status.    He tells me since his dose reduction he is actually doing quite well now.  His hands and feet are healing up.  He is walking quite a bit.  He rarely has diarrhea anymore.  His weight loss is stopped but he has not yet gained back any.  He notes his blood sugars have been running somewhat low lately.  He was having trouble with nosebleeds on his apixaban but he stopped his nasal CPAP and the nosebleeds have stopped and he is not having any other bleeding.    On exam via the video link he appears well and older than his stated age.    I personally reviewed his CT scan and went over the results with him.  His peritoneal nodules that we can see appear stable and I do not see any new sites of disease.    His alpha-fetoprotein as of a couple weeks ago is  immeasurably low, down from of level of 60 at the time of his recurrence.  His electrolytes are essentially normal with mild hyperkalemia and his creatinine is stable at 1.2.  His bilirubin, albumin and liver enzymes are normal.  He has stable macrocytic anemia with a hemoglobin of 9.7 and his platelets are improved at 138.    Assessment/plan: Hepatocellular carcinoma with peritoneal carcinomatosis in the setting of a prior liver transplant.  His disease is stable and he now seems to be having a tolerable level of side effects on 200 mg twice per day of sorafenib.  We will plan on continuing on with that dose and schedule and reevaluate his disease status again in about 2 months.

## 2023-11-30 NOTE — LETTER
11/30/2023         RE: Frandy Workman  [address] [facility] [address]        Dear Colleague,    Thank you for referring your patient, Frandy Workman, to the [facility]. Please see a copy of my visit note below.      Miller Workman returns today in follow-up of metastatic hepatocellular carcinoma.    He is 58 years old and was first diagnosed 5 years ago when he had 2 lesions in his liver treated with TACE and subsequently had a liver transplantation in November 2019.  On routine surveillance in June 2023 he was found to have new peritoneal masses which proved to be metastatic hepatocellular carcinoma.  At the end of July he started therapy with sorafenib at 400 mg twice per day complicated by diarrhea and then severe nausea and vomiting that actually resulted in hospitalization with acute renal failure.  Upon recovery he resumed active treatment and just 200 mg twice per day and tolerated that well and went up to 200 alternating with 400 but developed more problems with diarrhea and significant hand-foot toxicity and so now is back to just 200 mg/day.  He returns today for ongoing toxicity management and assessment of his disease status.    He tells me since his dose reduction he is actually doing quite well now.  His hands and feet are healing up.  He is walking quite a bit.  He rarely has diarrhea anymore.  His weight loss is stopped but he has not yet gained back any.  He notes his blood sugars have been running somewhat low lately.  He was having trouble with nosebleeds on his apixaban but he stopped his nasal CPAP and the nosebleeds have stopped and he is not having any other bleeding.    On exam via the video link he appears well and older than his stated age.    I personally reviewed his CT scan and went over the results with him.  His peritoneal nodules that we can see appear stable and I do not see any new sites of disease.    His alpha-fetoprotein as of a couple  weeks ago is immeasurably low, down from of level of 60 at the time of his recurrence.  His electrolytes are essentially normal with mild hyperkalemia and his creatinine is stable at 1.2.  His bilirubin, albumin and liver enzymes are normal.  He has stable macrocytic anemia with a hemoglobin of 9.7 and his platelets are improved at 138.    Assessment/plan: Hepatocellular carcinoma with peritoneal carcinomatosis in the setting of a prior liver transplant.  His disease is stable and he now seems to be having a tolerable level of side effects on 200 mg twice per day of sorafenib.  We will plan on continuing on with that dose and schedule and reevaluate his disease status again in about 2 months.      Again, thank you for allowing me to participate in the care of your patient.        Sincerely,        Miguel Villarreal MD

## 2023-12-04 DIAGNOSIS — C78.6 MALIGNANT NEOPLASM METASTATIC TO PERITONEUM (H): ICD-10-CM

## 2023-12-04 DIAGNOSIS — I12.9 HYPERTENSION, RENAL: Primary | ICD-10-CM

## 2023-12-04 DIAGNOSIS — N18.32 STAGE 3B CHRONIC KIDNEY DISEASE (CKD) (H): ICD-10-CM

## 2023-12-04 DIAGNOSIS — C22.0 HCC (HEPATOCELLULAR CARCINOMA) (H): Primary | ICD-10-CM

## 2023-12-04 RX ORDER — SORAFENIB 200 MG/1
200 TABLET, FILM COATED ORAL 2 TIMES DAILY
Qty: 60 TABLET | Refills: 0 | Status: SHIPPED | OUTPATIENT
Start: 2023-12-04 | End: 2023-12-27

## 2023-12-07 DIAGNOSIS — I12.9 HYPERTENSION, RENAL: ICD-10-CM

## 2023-12-08 DIAGNOSIS — E83.39 HYPOPHOSPHATEMIA: ICD-10-CM

## 2023-12-08 DIAGNOSIS — J20.5 ACUTE BRONCHITIS DUE TO RESPIRATORY SYNCYTIAL VIRUS (RSV): ICD-10-CM

## 2023-12-08 DIAGNOSIS — E83.42 HYPOMAGNESEMIA: ICD-10-CM

## 2023-12-08 DIAGNOSIS — Z95.1 S/P CABG (CORONARY ARTERY BYPASS GRAFT): ICD-10-CM

## 2023-12-08 DIAGNOSIS — Z94.4 STATUS POST LIVER TRANSPLANTATION (H): ICD-10-CM

## 2023-12-08 DIAGNOSIS — E11.3293 TYPE 2 DIABETES MELLITUS WITH MILD NONPROLIFERATIVE RETINOPATHY OF BOTH EYES WITHOUT MACULAR EDEMA, UNSPECIFIED WHETHER LONG TERM INSULIN USE (H): ICD-10-CM

## 2023-12-08 RX ORDER — SODIUM PHOSPHATE, DIBASIC, ANHYDROUS, POTASSIUM PHOSPHATE, MONOBASIC, AND SODIUM PHOSPHATE, MONOBASIC, MONOHYDRATE 852; 155; 130 MG/1; MG/1; MG/1
1 TABLET, COATED ORAL 4 TIMES DAILY
Qty: 120 TABLET | Refills: 1 | Status: SHIPPED | OUTPATIENT
Start: 2023-12-08 | End: 2024-07-05

## 2023-12-08 RX ORDER — NIFEDIPINE 60 MG/1
60 TABLET, EXTENDED RELEASE ORAL 2 TIMES DAILY
Qty: 6 TABLET | Refills: 0 | Status: SHIPPED | OUTPATIENT
Start: 2023-12-08 | End: 2023-12-17

## 2023-12-08 RX ORDER — METOPROLOL SUCCINATE 25 MG/1
25 TABLET, EXTENDED RELEASE ORAL DAILY
Qty: 45 TABLET | Refills: 1 | Status: SHIPPED | OUTPATIENT
Start: 2023-12-08 | End: 2024-02-20

## 2023-12-08 RX ORDER — MAGNESIUM OXIDE 400 MG/1
400 TABLET ORAL DAILY
Qty: 30 TABLET | Refills: 0 | Status: SHIPPED | OUTPATIENT
Start: 2023-12-08 | End: 2024-01-24

## 2023-12-08 RX ORDER — ALBUTEROL SULFATE 90 UG/1
2 AEROSOL, METERED RESPIRATORY (INHALATION) EVERY 4 HOURS PRN
Qty: 18 G | Refills: 1 | Status: SHIPPED | OUTPATIENT
Start: 2023-12-08 | End: 2024-04-30

## 2023-12-08 RX ORDER — PREDNISONE 5 MG/1
5 TABLET ORAL DAILY
Qty: 90 TABLET | Refills: 3 | Status: SHIPPED | OUTPATIENT
Start: 2023-12-08 | End: 2024-04-24

## 2023-12-12 ENCOUNTER — LAB (OUTPATIENT)
Dept: LAB | Facility: CLINIC | Age: 58
End: 2023-12-12
Payer: MEDICARE

## 2023-12-12 ENCOUNTER — OFFICE VISIT (OUTPATIENT)
Dept: NEPHROLOGY | Facility: CLINIC | Age: 58
End: 2023-12-12
Attending: INTERNAL MEDICINE
Payer: MEDICARE

## 2023-12-12 ENCOUNTER — VIRTUAL VISIT (OUTPATIENT)
Dept: BEHAVIORAL HEALTH | Facility: CLINIC | Age: 58
End: 2023-12-12
Payer: MEDICARE

## 2023-12-12 ENCOUNTER — MYC MEDICAL ADVICE (OUTPATIENT)
Dept: ONCOLOGY | Facility: CLINIC | Age: 58
End: 2023-12-12

## 2023-12-12 VITALS
WEIGHT: 169.3 LBS | TEMPERATURE: 98.6 F | DIASTOLIC BLOOD PRESSURE: 74 MMHG | HEART RATE: 85 BPM | OXYGEN SATURATION: 100 % | SYSTOLIC BLOOD PRESSURE: 126 MMHG | BODY MASS INDEX: 25 KG/M2

## 2023-12-12 DIAGNOSIS — N18.32 STAGE 3B CHRONIC KIDNEY DISEASE (CKD) (H): ICD-10-CM

## 2023-12-12 DIAGNOSIS — E11.29 TYPE 2 DIABETES MELLITUS WITH MICROALBUMINURIA, WITH LONG-TERM CURRENT USE OF INSULIN (H): Primary | ICD-10-CM

## 2023-12-12 DIAGNOSIS — C22.0 HCC (HEPATOCELLULAR CARCINOMA) (H): ICD-10-CM

## 2023-12-12 DIAGNOSIS — F41.1 GENERALIZED ANXIETY DISORDER: ICD-10-CM

## 2023-12-12 DIAGNOSIS — R80.9 TYPE 2 DIABETES MELLITUS WITH MICROALBUMINURIA, WITH LONG-TERM CURRENT USE OF INSULIN (H): Primary | ICD-10-CM

## 2023-12-12 DIAGNOSIS — F31.31 BIPOLAR 1 DISORDER, DEPRESSED, MILD (H): Primary | ICD-10-CM

## 2023-12-12 DIAGNOSIS — Z79.899 ENCOUNTER FOR LONG-TERM (CURRENT) USE OF MEDICATIONS: ICD-10-CM

## 2023-12-12 DIAGNOSIS — Z94.4 LIVER REPLACED BY TRANSPLANT (H): ICD-10-CM

## 2023-12-12 DIAGNOSIS — I12.9 HYPERTENSION, RENAL: ICD-10-CM

## 2023-12-12 DIAGNOSIS — Z79.4 TYPE 2 DIABETES MELLITUS WITH MICROALBUMINURIA, WITH LONG-TERM CURRENT USE OF INSULIN (H): Primary | ICD-10-CM

## 2023-12-12 LAB
ALBUMIN SERPL BCG-MCNC: 4.1 G/DL (ref 3.5–5.2)
ALP SERPL-CCNC: 97 U/L (ref 40–150)
ALT SERPL W P-5'-P-CCNC: 23 U/L (ref 0–70)
ANION GAP SERPL CALCULATED.3IONS-SCNC: 10 MMOL/L (ref 7–15)
AST SERPL W P-5'-P-CCNC: 25 U/L (ref 0–45)
BASOPHILS # BLD AUTO: 0 10E3/UL (ref 0–0.2)
BASOPHILS NFR BLD AUTO: 0 %
BILIRUB DIRECT SERPL-MCNC: <0.2 MG/DL (ref 0–0.3)
BILIRUB SERPL-MCNC: 0.4 MG/DL
BUN SERPL-MCNC: 21.2 MG/DL (ref 8–23)
CALCIUM SERPL-MCNC: 8.9 MG/DL (ref 8.6–10)
CHLORIDE SERPL-SCNC: 103 MMOL/L (ref 98–107)
CREAT SERPL-MCNC: 1.2 MG/DL (ref 0.67–1.17)
CREAT UR-MCNC: 18.4 MG/DL
DEPRECATED HCO3 PLAS-SCNC: 24 MMOL/L (ref 22–29)
EGFRCR SERPLBLD CKD-EPI 2021: 70 ML/MIN/1.73M2
EOSINOPHIL # BLD AUTO: 0 10E3/UL (ref 0–0.7)
EOSINOPHIL NFR BLD AUTO: 1 %
ERYTHROCYTE [DISTWIDTH] IN BLOOD BY AUTOMATED COUNT: 14.6 % (ref 10–15)
FERRITIN SERPL-MCNC: 967 NG/ML (ref 31–409)
GLUCOSE SERPL-MCNC: 154 MG/DL (ref 70–99)
HCT VFR BLD AUTO: 30.6 % (ref 40–53)
HGB BLD-MCNC: 9.5 G/DL (ref 13.3–17.7)
IMM GRANULOCYTES # BLD: 0 10E3/UL
IMM GRANULOCYTES NFR BLD: 1 %
IRON BINDING CAPACITY (ROCHE): 198 UG/DL (ref 240–430)
IRON SATN MFR SERPL: 22 % (ref 15–46)
IRON SERPL-MCNC: 43 UG/DL (ref 61–157)
LYMPHOCYTES # BLD AUTO: 0.5 10E3/UL (ref 0.8–5.3)
LYMPHOCYTES NFR BLD AUTO: 7 %
MAGNESIUM SERPL-MCNC: 1.6 MG/DL (ref 1.7–2.3)
MCH RBC QN AUTO: 31.3 PG (ref 26.5–33)
MCHC RBC AUTO-ENTMCNC: 31 G/DL (ref 31.5–36.5)
MCV RBC AUTO: 101 FL (ref 78–100)
MICROALBUMIN UR-MCNC: 499.7 MG/L
MICROALBUMIN/CREAT UR: 2715.76 MG/G CR (ref 0–17)
MONOCYTES # BLD AUTO: 0.3 10E3/UL (ref 0–1.3)
MONOCYTES NFR BLD AUTO: 4 %
NEUTROPHILS # BLD AUTO: 5.9 10E3/UL (ref 1.6–8.3)
NEUTROPHILS NFR BLD AUTO: 87 %
NRBC # BLD AUTO: 0 10E3/UL
NRBC BLD AUTO-RTO: 0 /100
PHOSPHATE SERPL-MCNC: 3.1 MG/DL (ref 2.5–4.5)
PLATELET # BLD AUTO: 162 10E3/UL (ref 150–450)
POTASSIUM SERPL-SCNC: 5.2 MMOL/L (ref 3.4–5.3)
PROT SERPL-MCNC: 6.9 G/DL (ref 6.4–8.3)
PTH-INTACT SERPL-MCNC: 302 PG/ML (ref 15–65)
RBC # BLD AUTO: 3.04 10E6/UL (ref 4.4–5.9)
SODIUM SERPL-SCNC: 137 MMOL/L (ref 135–145)
WBC # BLD AUTO: 6.8 10E3/UL (ref 4–11)

## 2023-12-12 PROCEDURE — G0463 HOSPITAL OUTPT CLINIC VISIT: HCPCS | Performed by: INTERNAL MEDICINE

## 2023-12-12 PROCEDURE — 82728 ASSAY OF FERRITIN: CPT | Performed by: PATHOLOGY

## 2023-12-12 PROCEDURE — 82248 BILIRUBIN DIRECT: CPT | Performed by: PATHOLOGY

## 2023-12-12 PROCEDURE — 85025 COMPLETE CBC W/AUTO DIFF WBC: CPT | Performed by: PATHOLOGY

## 2023-12-12 PROCEDURE — 84100 ASSAY OF PHOSPHORUS: CPT | Performed by: PATHOLOGY

## 2023-12-12 PROCEDURE — 83970 ASSAY OF PARATHORMONE: CPT | Performed by: PATHOLOGY

## 2023-12-12 PROCEDURE — 83550 IRON BINDING TEST: CPT | Performed by: PATHOLOGY

## 2023-12-12 PROCEDURE — 36415 COLL VENOUS BLD VENIPUNCTURE: CPT | Performed by: PATHOLOGY

## 2023-12-12 PROCEDURE — 90834 PSYTX W PT 45 MINUTES: CPT | Mod: 95 | Performed by: PSYCHOLOGIST

## 2023-12-12 PROCEDURE — 80053 COMPREHEN METABOLIC PANEL: CPT | Performed by: PATHOLOGY

## 2023-12-12 PROCEDURE — 82570 ASSAY OF URINE CREATININE: CPT | Performed by: INTERNAL MEDICINE

## 2023-12-12 PROCEDURE — 99215 OFFICE O/P EST HI 40 MIN: CPT | Performed by: INTERNAL MEDICINE

## 2023-12-12 PROCEDURE — 82306 VITAMIN D 25 HYDROXY: CPT | Performed by: INTERNAL MEDICINE

## 2023-12-12 PROCEDURE — 83540 ASSAY OF IRON: CPT | Performed by: PATHOLOGY

## 2023-12-12 PROCEDURE — 83735 ASSAY OF MAGNESIUM: CPT | Performed by: PATHOLOGY

## 2023-12-12 ASSESSMENT — PAIN SCALES - GENERAL: PAINLEVEL: NO PAIN (0)

## 2023-12-12 ASSESSMENT — PATIENT HEALTH QUESTIONNAIRE - PHQ9
SUM OF ALL RESPONSES TO PHQ QUESTIONS 1-9: 2
10. IF YOU CHECKED OFF ANY PROBLEMS, HOW DIFFICULT HAVE THESE PROBLEMS MADE IT FOR YOU TO DO YOUR WORK, TAKE CARE OF THINGS AT HOME, OR GET ALONG WITH OTHER PEOPLE: SOMEWHAT DIFFICULT
SUM OF ALL RESPONSES TO PHQ QUESTIONS 1-9: 2

## 2023-12-12 NOTE — ORAL ONC MGMT
Oral Chemotherapy Monitoring Program  Lab Follow Up    Patient is on sorafenib (Nexavar)  Reviewed lab results from 12/12/23.    Assessment & Plan:  CBC, CMP, Magnesium, and Phosphorus reviewed. Results show no concerning abnormalities.  Magnesium is just under normal, but is trending upwards. Still on oral supplement. Plan to continue Nexavar as prescribed. [employer] message sent to patient regarding results and plan.     Follow-Up:  1/30: labs  2/1: Dr Villarreal visit      Fallon Coello, Aubrey  Hematology/Oncology Clinical Pharmacist  Oral Chemotherapy Monitoring Program  [facility]  [phone]  December 12, 2023            10/6/2023     3:00 PM 10/31/2023    10:00 AM 11/1/2023     1:00 PM 11/3/2023     3:00 PM 11/21/2023     2:00 PM 12/4/2023     2:00 PM 12/12/2023     1:00 PM   ORAL CHEMOTHERAPY   Assessment Type Refill Refill;Chart Review Refill Initial Follow up Other Refill Lab Monitoring   Diagnosis Code Hepatocellular Cancer Hepatocellular Cancer Hepatocellular Cancer Hepatocellular Cancer Hepatocellular Cancer Hepatocellular Cancer Hepatocellular Cancer   Providers Dr. Cherelle Villarreal   [facility]   Is this patient followed by the [facility] OC team? No No No No No No No   Drug Name Nexavar (sorafenib) Nexavar (sorafenib) Nexavar (sorafenib) Nexavar (sorafenib) Nexavar (sorafenib) Nexavar (sorafenib) Nexavar (sorafenib)   Dose     200 mg 200 mg 200 mg   Current Schedule BID BID BID BID BID BID BID   Cycle Details Continuous Continuous Continuous Continuous Continuous Continuous Continuous   Doses missed in last 2 weeks    0 0     Adherence Assessment    Adherent Adherent     Adverse Effects    Diarrhea No AE identified during assessment  No AE identified during assessment   Diarrhea    Grade 2      Pharmacist Intervention(diarrhea)    No      Any new drug interactions?    No       Is the dose as ordered appropriate for the patient?    Yes   Yes   Since the last time we talked, have you been hospitalized or used the emergency room?    No          Labs:  _  Result Component Current Result Ref Range   Sodium 137 (12/12/2023) 135 - 145 mmol/L     _  Result Component Current Result Ref Range   Potassium 5.2 (12/12/2023) 3.4 - 5.3 mmol/L     _  Result Component Current Result Ref Range   Calcium 8.9 (12/12/2023) 8.6 - 10.0 mg/dL     _  Result Component Current Result Ref Range   Magnesium 1.6 (L) (12/12/2023) 1.7 - 2.3 mg/dL     _  Result Component Current Result Ref Range   Phosphorus 3.1 (12/12/2023) 2.5 - 4.5 mg/dL     _  Result Component Current Result Ref Range   Albumin 4.1 (12/12/2023) 3.5 - 5.2 g/dL     _  Result Component Current Result Ref Range   Urea Nitrogen 21.2 (12/12/2023) 8.0 - 23.0 mg/dL     _  Result Component Current Result Ref Range   Creatinine 1.20 (H) (12/12/2023) 0.67 - 1.17 mg/dL     _  Result Component Current Result Ref Range   AST 25 (12/12/2023) 0 - 45 U/L     _  Result Component Current Result Ref Range   ALT 23 (12/12/2023) 0 - 70 U/L     _  Result Component Current Result Ref Range   Bilirubin Total 0.4 (12/12/2023) <=1.2 mg/dL     _  Result Component Current Result Ref Range   WBC Count 6.8 (12/12/2023) 4.0 - 11.0 10e3/uL     _  Result Component Current Result Ref Range   Hemoglobin 9.5 (L) (12/12/2023) 13.3 - 17.7 g/dL     _  Result Component Current Result Ref Range   Platelet Count 162 (12/12/2023) 150 - 450 10e3/uL     No results found for ANC within last 30 days.

## 2023-12-12 NOTE — LETTER
2023       RE: Frandy Workman  [address] [facility] [address]     Dear Colleague,    Thank you for referring your patient, Frandy Workman, to the [facility] NEPHROLOGY [facility]. Please see a copy of my visit note below.      Nephrology Clinic     DOS:  2023     Name: Frandy Workman  MRN: [id number]  Age: 58 year old  : 1965  Referring provider: Natalie Russell     Assessment and Plan:  1. CKD, stage 3b (A3): Prior to recent liver transplant baseline Cr ~1.1 mg/dL with eGFR of 75 ml/min. Post-transplant creatinine has gradually fluctuated betwen  ~1.6-1.8 mg/dL (eGFR of 40 ml/min) and remains relatively stable though his Cr is much better more recently and seems to fluctuate from 1.2-1.4 mg/dL with an eGFR of 65 ml/min.  Unclear if this rise in Cr is due to progression of kidney disease or may be more  pre-renal in nature.  Etiology of CKD likely multifactorial and related to some degree of diabetic changes further complicated by chronic changes from past lithium use (for 15 years) and further complicated by tacrolimus toxicity.  He is at risk of progressive CKD due diabetes.  As mentioned, he is now off of tacrolimus and continues on MMF and prednisone for his liver transplant.  He did have worsening albuminuria that significantly improved after restarting lisinopril  and empagliflozin.  However, both meds were discontinued after presenting with JERONIMO and hyperkalemia.  Fortunately, he has tolerated starting lisinopril at a low dose (5 mg) and we will attempt to start empagliflozin today.      -continue lisinopril at a low dose of 5 mg daily with goal of slowing progeession of CKD and minimizing albuminuria (albumin to Cr ratio ~800 mg/g today).   -restart empagliflozin at 10 mg daiy with the goal of minimizing albuminuria, slowing CKD progression, and minimizing cardiovascular events given his past  CABG and ischemic HFrEF (45-50% by TTE In July, 2021)  -repeat renal panel in 2 weeks after starting empagliflozin    -RTC in 6 months     2. HTN/Volume status: Euvolemic. SBP now at goal and is mostly in low 120s.    -he will continue metoprolol XL at 12.5,  lisinopril 5 mg daily (RAAS blockade both for cardio and renal protection) and nifedipine XL 60 mg daily  -restart empagliflozin which likely lead to better blood pressure control as well  -he will measure his BP daily and report to clinic for further adjustments.       3.  Electrolytes:  Potassium on higher side in past.  Suspect multifactorial in nature and due to CKD with underlying type 4 RTA physiology, hyperglycemia and past use of tacrolimus. It did improve after discontinuation of tacrolimus.  -refered to a dietician in past for more education regarding a low potassium diet.   No specific intervention needed at this time.   -as mentioned, we will restart empagliflozin which may help with reducing his serum potassium as well    4. Anemia: Possibly related to Imuran in the past, which has been discontinued.  Iron stores are replete.  Anemia of chronic disease and renal insufficiency also likely contributing.  He did IVELISSE treatment and hemoglobin improved to 13.9 in the past.  Hemoglobin dropped again requiring restarting IVELISSE therapy (and a blood transfusion).  Hemoglobin was stable in the 12's but did drop to the 9's after starting chemotherapy.        -he will continue to follow in the anemia management clinic     5.  Fatigue:  Suspect related to multiple comorbidities and deconditioned state.  We did check a TSH that was within normal limits.     -monitor for now    Follow-up: RTC in 6 months     Reason For Visit:   CKD    HPI:   Frandy Workman is a 57 year old man who presents for CKD f/u.  His past medical history is remarkable for liver transplant (November, 2019) for ESTRADA cirrhosis (complicated by ascites and hepatic encephalopathy on lactulose and  rifaximin in the past), hepatocellular carcinoma (s/p TACE and microwave ablation 01/2019), long standing DM 2 (complicated by nonproliferative diabetic retinopathy, macular edema and peripheral neuropathy), bipolar disorder (previously on lithium for 15 years), HTN, hyperlipidemia and CAD by angiography not requiring PCI.      He denies excessive use of NSAIDs in the past.  He had no history of nephrolithiasis or frequent UTIs.  He has no significant family history of CKD.     In terms of CKD, he appeared to have a baseline of ~1.1 mg/dL prior to his liver transplant in November 2019.  Creatinine after transplant was ~1.3 mg/dL at time of discharge and vikram to 3.2 mg/dL thought to be prerenal from volume depletion.  He was admitted for IVF and creatinine improved to 1.5 mg/dL at time of discharge.  His serum Cr now seems to fluctuate between 1.2-1.4mg/dL with an eGFR of 65 ml/min.  He continues on MMF in place of tacrolimus for his liver transplant.  He no longer is on lasix for management of edema.  He has not required IVELISSE therapy in several months.  He had been on empagliflozin for management of diabetes but that was discontinued during a previous hospitalization. He reports weight gain due to lack of exercise during the COVID pandemic.       In July, 2021 he had unstble angina in the setting of severe anemia and eventually was noted to have a NSTEMI and underwent a CABG.  He was started on lisinopril and metoprolol but lisinopril was discontinued due to hypotension. His empagliflozin was discontinued during his recent hospitalization. Fortunately, we were able to restart both lisinopril and empagliflozin but both were discontinued when he presented with JERONIMO and hyperkalemia (requiring dialysis briefly) after starting treatment for metastatic hepatocellular cancer.  His BP was also elevated since starting therapy with sorafenib. His does was reduced to 200 mg BID to minimize toxicity.  He did have significant  hypertension since starting therapy that seems under better control since starting nifedipine.  Currently, he is on nifedipine XL 60 mg BID, metoprolol XL 12.5 mg and lisinopril 5 mg daily.  Though he reports today that he is not taking lisinopril. He underwent cardiac rehab and has recovered well.  Anemia has been an intermittent issue requiring blood transfusions and IVELISSE therapy in past.  He otherwise, has no major medical complaints.        Review of Systems:   Pertinent items are noted in HPI or as below, remainder of complete ROS is negative.      Active Medications:   Current Outpatient Medications   Medication    albuterol (PROAIR HFA/PROVENTIL HFA/VENTOLIN HFA) 108 (90 Base) MCG/ACT inhaler    Alcohol Swabs (ALCOHOL PREP) 70 % PADS    ammonium lactate (AMLACTIN) 12 % external cream    apixaban ANTICOAGULANT (ELIQUIS) 5 MG tablet    BD VIKTORIA U/F 32G X 4 MM insulin pen needle    benzoyl peroxide 5 % external liquid    blood glucose (NO BRAND SPECIFIED) lancets standard    Continuous Blood Gluc Sensor (FREESTYLE CLAUDIA 2 SENSOR) MISC    empagliflozin (JARDIANCE) 10 MG TABS tablet    insulin aspart (NOVOLOG PEN) 100 UNIT/ML pen    insulin degludec (TRESIBA FLEXTOUCH) 100 UNIT/ML pen    K-Phos-Neutral 155-852-130 MG tablet    ketoconazole (NIZORAL) 2 % external shampoo    lamiVUDine (EPIVIR) 100 MG tablet    lisinopril (ZESTRIL) 5 MG tablet    loperamide (IMODIUM A-D) 2 MG tablet    magnesium oxide (MAG-OX) 400 MG tablet    metoprolol succinate ER (TOPROL XL) 25 MG 24 hr tablet    Multiple Vitamin (TAB-A-GLADIS) TABS    mycophenolate (GENERIC EQUIVALENT) 250 MG capsule    NIFEdipine ER OSMOTIC (PROCARDIA XL) 60 MG 24 hr tablet    ondansetron (ZOFRAN ODT) 8 MG ODT tab    pantoprazole (PROTONIX) 40 MG EC tablet    predniSONE (DELTASONE) 5 MG tablet    rosuvastatin (CRESTOR) 20 MG tablet    SORAfenib (NEXAVAR) 200 MG tablet    SORAfenib (NEXAVAR) 200 MG tablet    tamsulosin (FLOMAX) 0.4 MG capsule    Vitamin D3 50 mcg  (2000 units) tablet     Current Facility-Administered Medications   Medication    aflibercept (EYLEA) injection prefilled syringe 2 mg    aflibercept (EYLEA) injection prefilled syringe 2 mg    dexamethasone (OZURDEX) intravitreal implant 0.7 mg    dexamethasone (OZURDEX) intravitreal implant 0.7 mg    faricimab-svoa (VABYSMO) intravitreal injection 6 mg    faricimab-svoa (VABYSMO) intravitreal injection 6 mg    faricimab-svoa (VABYSMO) intravitreal injection 6 mg    lidocaine (PF) (XYLOCAINE) injection 1 mL        Allergies:   Codeine and Seasonal allergies      Past Medical History:  Chronic kidney disease, stage 3  Type 2 diabetes mellitus  Bipolar affective disorder   Brow ptosis  Hepatocellular carcinoma  Coronary artery disease s/p CABG  Hypertension   Anemia   Anxiety   Mild recurrent major depression  Mild nonproliferative retinopathy  Gastroesophageal reflux disease   Cholelithiasis   Benign prostatic hypertrophy   Portal vein thrombosis      Past Surgical History:  Liver transplant recipient   Coronary catheterization  Parotidectomy, radical neck dissection  Right eyelid weight placement  Orthopedic surgery    Family History:   Prostate cancer - maternal grandfather, father   Substance abuse - maternal grandfather, maternal grandmother   Colon cancer - father, paternal grandmother  Cancer - mother  Thyroid disease - mother, sister   Stroke - mother  Depression - mother, sister  Asthma - sister      Social History:   Presents to clinic alone  Tobacco Use: Former smoker, 180 pack year history, quit 2017.   Alcohol Use: quit September 1996  PCP: Lamin Ortiz      Physical Exam:  /74   Pulse 85   Temp 98.6  F (37  C) (Oral)   Wt 76.8 kg (169 lb 4.8 oz)   SpO2 100%   BMI 25.00 kg/m     GA: NAD  HEENT: no scleral icterus, no cervical LAD  CV: regular, no rub, no JVD, no edema  PULM: Lungs CTA B  GI: abd soft, NT, ND  : no CVA tenderness  MSK: no joint effusions, trace LE edema  SKIN: no  concerning rash  NEURO: no gross focal deficit     Laboratory:  CMP  Recent Labs   Lab Test 12/12/23  0941 11/30/23  1505 11/28/23  1010 11/14/23  0718 11/10/23  1336 07/12/21  1036 06/28/21  1347 06/14/21  1322 06/04/21  1236 05/05/21  0909     --  135 138 141   < > 137 139 138 139   POTASSIUM 5.2 5.3 5.6* 5.3 4.5   < > 4.6 4.7 4.6 4.5   CHLORIDE 103  --  106 104 106   < > 107 106 106 107   CO2 24  --  21* 25 24   < > 25 26 26 26   ANIONGAP 10  --  8 9 11   < > 5 7 6 6   *  --  116* 163* 243*   < > 208* 173* 156* 258*   BUN 21.2  --  23.2* 22.8 19.8   < > 33* 29 39* 38*   CR 1.20*  --  1.23* 1.22* 1.23*   < > 1.62* 1.54* 1.64* 1.52*   GFRESTIMATED 70  --  68 68 68   < > 46* 49* 46* 50*   GFRESTBLACK  --   --   --   --   --   --  54* 57* 53* 58*   DEBORAH 8.9  --  7.9* 7.9* 7.6*   < > 9.1 9.8 10.2* 9.6   MAG 1.6*  --  1.4* 1.4* 1.3*   < >  --   --  2.2  --    PHOS 3.1  --  2.7 3.1 2.4*   < >  --   --   --   --    PROTTOTAL 6.9  --  6.3* 6.5 6.5   < > 7.4 7.8 7.8 7.8   ALBUMIN 4.1  --  3.9 4.0 3.9   < > 4.0 4.1 4.2 4.2   BILITOTAL 0.4  --  0.3 0.3 0.5   < > 0.5 0.6 0.5 0.5   ALKPHOS 97  --  95 104 107   < > 98 106 108 112   AST 25  --  23 31 33   < > 9 8 10 13   ALT 23  --  21 25 28   < > 20 21 26 28    < > = values in this interval not displayed.     CBC  Recent Labs   Lab Test 12/12/23  0941 11/28/23  1010 11/14/23  0718 11/10/23  1336   HGB 9.5* 9.7* 9.1* 9.6*   WBC 6.8 5.5 4.2 5.5   RBC 3.04* 2.97* 2.90* 3.05*   HCT 30.6* 30.3* 29.5* 30.5*   * 102* 102* 100   MCH 31.3 32.7 31.4 31.5   MCHC 31.0* 32.0 30.8* 31.5   RDW 14.6 15.4* 15.0 15.1*    138* 114* 100*     INR  Recent Labs   Lab Test 08/10/23  1833 01/11/22  2200 07/14/21  1351 07/14/21  1215 11/12/19  1645 11/12/19  1400 11/12/19  0950 11/12/19  0346   INR 1.16* 1.13 1.28* 1.45*   < > 1.46*   < > 1.47*   PTT  --   --  46* 37  --  39*  --  57*    < > = values in this interval not displayed.     ABG  Recent Labs   Lab Test 08/11/23  0852  08/11/23  0209 08/11/23  0143 08/10/23  2152 01/10/22  2314 07/14/21  2049 07/14/21  1605 07/14/21  1352 07/14/21  1351 07/14/21  1252 07/14/21  1216   PH  --   --   --   --  7.37  --  7.37  --  7.29* 7.36 7.38   PCO2  --   --   --   --   --   --  37  --  44 35 36   PO2  --   --   --   --   --   --  87  --  145* 181* 306*   HCO3  --   --   --   --   --   --  21  --  21 20* 21   O2PER 22 21 21 0  --    < > 21   < > 5 50.0 75.0    < > = values in this interval not displayed.      URINE STUDIES  Recent Labs   Lab Test 08/11/23  0043 03/15/23  0915 01/11/22  0001 09/07/21  1115   COLOR Light Yellow Straw Light Yellow Yellow   APPEARANCE Clear Clear Clear Cloudy*   URINEGLC 300* >=1000* >=1000* 50*   URINEBILI Negative Negative Negative Negative   URINEKETONE Negative Negative Negative Negative   SG 1.009 1.023 1.018 1.022   UBLD Small* Trace* Negative Small*   URINEPH 5.0 5.5 5.0 5.0   PROTEIN 30* 50* Negative >499*   NITRITE Negative Negative Negative Negative   LEUKEST Negative Negative Negative Negative   RBCU 0 1 <1 1   WBCU 3 <1 1 1     Recent Labs   Lab Test 04/20/22  0919 10/04/21  0804 09/07/21  1115 02/26/21  0750 06/29/20  1008 03/02/20  1013 12/02/19  1040 07/08/19  1017 02/04/19  0705   UTPG 0.12 1.46* 1.17* 0.47* 0.89* 0.29* 1.22* 0.11 0.14     PTH  Recent Labs   Lab Test 12/12/23  0941 03/15/23  0906 04/20/22  0912 10/04/21  0802 06/29/20  1005 07/08/19  1020   PTHI 302* 78* 59 81* 61 54     IRON STUDIES   Recent Labs   Lab Test 12/12/23  0941 04/25/22  0925 02/09/22  0850 11/16/21  1004 11/02/21  1031 07/12/21  1608 06/28/21  1347 06/04/21  1236 12/02/20  0739 11/11/20  0754 10/14/20  0836 09/16/20  0802 07/29/20  0749 06/29/20  1005 05/21/20  0723 04/22/20  0833 03/09/20  0823 01/27/20  1340 12/02/19  1034 12/28/18  1108 09/15/18  1215 06/09/17  1250   IRON 43* 119 96 93 109  --  112 158 75 81 73 93 87 60 72 65 92  --  88  --  52  --    * 281 248 211* 241  --  226* 270 228* 227* 248 221* 230* 204*  192* 215* 231*  --  248  --  403  --    IRONSAT 22 42 39 44 45  --  50* 59* 33 36 29 42 38 29 38 30 40  --  36  --  13*  --    QUAN 967* 508* 1,046* 400* 479* 543* 495* 399* 433* 286 323 223 193 352 344 643* 859* 690* 1,353* 90 10* 450*       All above labs reviewed by me.     Total time spent was 45 minutes, and more than 50% of face to face time was spent in counseling and/or coordination of care regarding principles of multidisciplinary care, medication management, and chronic kidney disease education.  MD Eloy Nelson MD   [phone]

## 2023-12-12 NOTE — PROGRESS NOTES
Nephrology Clinic     DOS:  2023     Name: Frandy Workman  MRN: [id number]  Age: 58 year old  : 1965  Referring provider: Natalie Russell     Assessment and Plan:  1. CKD, stage 3b (A3): Prior to recent liver transplant baseline Cr ~1.1 mg/dL with eGFR of 75 ml/min. Post-transplant creatinine has gradually fluctuated betwen  ~1.6-1.8 mg/dL (eGFR of 40 ml/min) and remains relatively stable though his Cr is much better more recently and seems to fluctuate from 1.2-1.4 mg/dL with an eGFR of 65 ml/min.  Unclear if this rise in Cr is due to progression of kidney disease or may be more  pre-renal in nature.  Etiology of CKD likely multifactorial and related to some degree of diabetic changes further complicated by chronic changes from past lithium use (for 15 years) and further complicated by tacrolimus toxicity.  He is at risk of progressive CKD due diabetes.  As mentioned, he is now off of tacrolimus and continues on MMF and prednisone for his liver transplant.  He did have worsening albuminuria that significantly improved after restarting lisinopril  and empagliflozin.  However, both meds were discontinued after presenting with JERONIMO and hyperkalemia.  Fortunately, he has tolerated starting lisinopril at a low dose (5 mg) and we will attempt to start empagliflozin today.      -continue lisinopril at a low dose of 5 mg daily with goal of slowing progeession of CKD and minimizing albuminuria (albumin to Cr ratio ~800 mg/g today).   -restart empagliflozin at 10 mg daiy with the goal of minimizing albuminuria, slowing CKD progression, and minimizing cardiovascular events given his past CABG and ischemic HFrEF (45-50% by TTE In )  -repeat renal panel in 2 weeks after starting empagliflozin    -RTC in 6 months     2. HTN/Volume status: Euvolemic. SBP now at goal and is mostly in low 120s.    -he will continue metoprolol XL at 12.5,  lisinopril 5 mg daily (RAAS blockade both for cardio and  renal protection) and nifedipine XL 60 mg daily  -restart empagliflozin which likely lead to better blood pressure control as well  -he will measure his BP daily and report to clinic for further adjustments.       3.  Electrolytes:  Potassium on higher side in past.  Suspect multifactorial in nature and due to CKD with underlying type 4 RTA physiology, hyperglycemia and past use of tacrolimus. It did improve after discontinuation of tacrolimus.  -refered to a dietician in past for more education regarding a low potassium diet.   No specific intervention needed at this time.   -as mentioned, we will restart empagliflozin which may help with reducing his serum potassium as well    4. Anemia: Possibly related to Imuran in the past, which has been discontinued.  Iron stores are replete.  Anemia of chronic disease and renal insufficiency also likely contributing.  He did IVELISSE treatment and hemoglobin improved to 13.9 in the past.  Hemoglobin dropped again requiring restarting IVELISSE therapy (and a blood transfusion).  Hemoglobin was stable in the 12's but did drop to the 9's after starting chemotherapy.        -he will continue to follow in the anemia management clinic     5.  Fatigue:  Suspect related to multiple comorbidities and deconditioned state.  We did check a TSH that was within normal limits.     -monitor for now    Follow-up: RTC in 6 months     Reason For Visit:   CKD    HPI:   Frandy Workman is a 57 year old man who presents for CKD f/u.  His past medical history is remarkable for liver transplant (November, 2019) for ESTRADA cirrhosis (complicated by ascites and hepatic encephalopathy on lactulose and rifaximin in the past), hepatocellular carcinoma (s/p TACE and microwave ablation 01/2019), long standing DM 2 (complicated by nonproliferative diabetic retinopathy, macular edema and peripheral neuropathy), bipolar disorder (previously on lithium for 15 years), HTN, hyperlipidemia and CAD by angiography not  requiring PCI.      He denies excessive use of NSAIDs in the past.  He had no history of nephrolithiasis or frequent UTIs.  He has no significant family history of CKD.     In terms of CKD, he appeared to have a baseline of ~1.1 mg/dL prior to his liver transplant in November 2019.  Creatinine after transplant was ~1.3 mg/dL at time of discharge and vikram to 3.2 mg/dL thought to be prerenal from volume depletion.  He was admitted for IVF and creatinine improved to 1.5 mg/dL at time of discharge.  His serum Cr now seems to fluctuate between 1.2-1.4mg/dL with an eGFR of 65 ml/min.  He continues on MMF in place of tacrolimus for his liver transplant.  He no longer is on lasix for management of edema.  He has not required IVELISSE therapy in several months.  He had been on empagliflozin for management of diabetes but that was discontinued during a previous hospitalization. He reports weight gain due to lack of exercise during the COVID pandemic.       In July, 2021 he had unstble angina in the setting of severe anemia and eventually was noted to have a NSTEMI and underwent a CABG.  He was started on lisinopril and metoprolol but lisinopril was discontinued due to hypotension. His empagliflozin was discontinued during his recent hospitalization. Fortunately, we were able to restart both lisinopril and empagliflozin but both were discontinued when he presented with JERONIMO and hyperkalemia (requiring dialysis briefly) after starting treatment for metastatic hepatocellular cancer.  His BP was also elevated since starting therapy with sorafenib. His does was reduced to 200 mg BID to minimize toxicity.  He did have significant hypertension since starting therapy that seems under better control since starting nifedipine.  Currently, he is on nifedipine XL 60 mg BID, metoprolol XL 12.5 mg and lisinopril 5 mg daily.  Though he reports today that he is not taking lisinopril. He underwent cardiac rehab and has recovered well.  Anemia has  been an intermittent issue requiring blood transfusions and IVELISSE therapy in past.  He otherwise, has no major medical complaints.        Review of Systems:   Pertinent items are noted in HPI or as below, remainder of complete ROS is negative.      Active Medications:   Current Outpatient Medications   Medication     albuterol (PROAIR HFA/PROVENTIL HFA/VENTOLIN HFA) 108 (90 Base) MCG/ACT inhaler     Alcohol Swabs (ALCOHOL PREP) 70 % PADS     ammonium lactate (AMLACTIN) 12 % external cream     apixaban ANTICOAGULANT (ELIQUIS) 5 MG tablet     BD VIKTORIA U/F 32G X 4 MM insulin pen needle     benzoyl peroxide 5 % external liquid     blood glucose (NO BRAND SPECIFIED) lancets standard     Continuous Blood Gluc Sensor (FREESTYLE CLAUDIA 2 SENSOR) MISC     empagliflozin (JARDIANCE) 10 MG TABS tablet     insulin aspart (NOVOLOG PEN) 100 UNIT/ML pen     insulin degludec (TRESIBA FLEXTOUCH) 100 UNIT/ML pen     K-Phos-Neutral 155-852-130 MG tablet     ketoconazole (NIZORAL) 2 % external shampoo     lamiVUDine (EPIVIR) 100 MG tablet     lisinopril (ZESTRIL) 5 MG tablet     loperamide (IMODIUM A-D) 2 MG tablet     magnesium oxide (MAG-OX) 400 MG tablet     metoprolol succinate ER (TOPROL XL) 25 MG 24 hr tablet     Multiple Vitamin (TAB-A-GLADIS) TABS     mycophenolate (GENERIC EQUIVALENT) 250 MG capsule     NIFEdipine ER OSMOTIC (PROCARDIA XL) 60 MG 24 hr tablet     ondansetron (ZOFRAN ODT) 8 MG ODT tab     pantoprazole (PROTONIX) 40 MG EC tablet     predniSONE (DELTASONE) 5 MG tablet     rosuvastatin (CRESTOR) 20 MG tablet     SORAfenib (NEXAVAR) 200 MG tablet     SORAfenib (NEXAVAR) 200 MG tablet     tamsulosin (FLOMAX) 0.4 MG capsule     Vitamin D3 50 mcg (2000 units) tablet     Current Facility-Administered Medications   Medication     aflibercept (EYLEA) injection prefilled syringe 2 mg     aflibercept (EYLEA) injection prefilled syringe 2 mg     dexamethasone (OZURDEX) intravitreal implant 0.7 mg     dexamethasone (OZURDEX)  intravitreal implant 0.7 mg     faricimab-svoa (VABYSMO) intravitreal injection 6 mg     faricimab-svoa (VABYSMO) intravitreal injection 6 mg     faricimab-svoa (VABYSMO) intravitreal injection 6 mg     lidocaine (PF) (XYLOCAINE) injection 1 mL        Allergies:   Codeine and Seasonal allergies      Past Medical History:  Chronic kidney disease, stage 3  Type 2 diabetes mellitus  Bipolar affective disorder   Brow ptosis  Hepatocellular carcinoma  Coronary artery disease s/p CABG  Hypertension   Anemia   Anxiety   Mild recurrent major depression  Mild nonproliferative retinopathy  Gastroesophageal reflux disease   Cholelithiasis   Benign prostatic hypertrophy   Portal vein thrombosis      Past Surgical History:  Liver transplant recipient   Coronary catheterization  Parotidectomy, radical neck dissection  Right eyelid weight placement  Orthopedic surgery    Family History:   Prostate cancer - maternal grandfather, father   Substance abuse - maternal grandfather, maternal grandmother   Colon cancer - father, paternal grandmother  Cancer - mother  Thyroid disease - mother, sister   Stroke - mother  Depression - mother, sister  Asthma - sister      Social History:   Presents to clinic alone  Tobacco Use: Former smoker, 180 pack year history, quit 2017.   Alcohol Use: quit September 1996  PCP: Lamin Ortiz      Physical Exam:  /74   Pulse 85   Temp 98.6  F (37  C) (Oral)   Wt 76.8 kg (169 lb 4.8 oz)   SpO2 100%   BMI 25.00 kg/m     GA: NAD  HEENT: no scleral icterus, no cervical LAD  CV: regular, no rub, no JVD, no edema  PULM: Lungs CTA B  GI: abd soft, NT, ND  : no CVA tenderness  MSK: no joint effusions, trace LE edema  SKIN: no concerning rash  NEURO: no gross focal deficit     Laboratory:  CMP  Recent Labs   Lab Test 12/12/23  0941 11/30/23  1505 11/28/23  1010 11/14/23  0718 11/10/23  1336 07/12/21  1036 06/28/21  1347 06/14/21  1322 06/04/21  1236 05/05/21  0909     --  135 138 141   < >  137 139 138 139   POTASSIUM 5.2 5.3 5.6* 5.3 4.5   < > 4.6 4.7 4.6 4.5   CHLORIDE 103  --  106 104 106   < > 107 106 106 107   CO2 24  --  21* 25 24   < > 25 26 26 26   ANIONGAP 10  --  8 9 11   < > 5 7 6 6   *  --  116* 163* 243*   < > 208* 173* 156* 258*   BUN 21.2  --  23.2* 22.8 19.8   < > 33* 29 39* 38*   CR 1.20*  --  1.23* 1.22* 1.23*   < > 1.62* 1.54* 1.64* 1.52*   GFRESTIMATED 70  --  68 68 68   < > 46* 49* 46* 50*   GFRESTBLACK  --   --   --   --   --   --  54* 57* 53* 58*   DEBORAH 8.9  --  7.9* 7.9* 7.6*   < > 9.1 9.8 10.2* 9.6   MAG 1.6*  --  1.4* 1.4* 1.3*   < >  --   --  2.2  --    PHOS 3.1  --  2.7 3.1 2.4*   < >  --   --   --   --    PROTTOTAL 6.9  --  6.3* 6.5 6.5   < > 7.4 7.8 7.8 7.8   ALBUMIN 4.1  --  3.9 4.0 3.9   < > 4.0 4.1 4.2 4.2   BILITOTAL 0.4  --  0.3 0.3 0.5   < > 0.5 0.6 0.5 0.5   ALKPHOS 97  --  95 104 107   < > 98 106 108 112   AST 25  --  23 31 33   < > 9 8 10 13   ALT 23  --  21 25 28   < > 20 21 26 28    < > = values in this interval not displayed.     CBC  Recent Labs   Lab Test 12/12/23  0941 11/28/23  1010 11/14/23  0718 11/10/23  1336   HGB 9.5* 9.7* 9.1* 9.6*   WBC 6.8 5.5 4.2 5.5   RBC 3.04* 2.97* 2.90* 3.05*   HCT 30.6* 30.3* 29.5* 30.5*   * 102* 102* 100   MCH 31.3 32.7 31.4 31.5   MCHC 31.0* 32.0 30.8* 31.5   RDW 14.6 15.4* 15.0 15.1*    138* 114* 100*     INR  Recent Labs   Lab Test 08/10/23  1833 01/11/22  2200 07/14/21  1351 07/14/21  1215 11/12/19  1645 11/12/19  1400 11/12/19  0950 11/12/19  0346   INR 1.16* 1.13 1.28* 1.45*   < > 1.46*   < > 1.47*   PTT  --   --  46* 37  --  39*  --  57*    < > = values in this interval not displayed.     ABG  Recent Labs   Lab Test 08/11/23  0852 08/11/23  0209 08/11/23  0143 08/10/23  2152 01/10/22  2314 07/14/21  2049 07/14/21  1605 07/14/21  1352 07/14/21  1351 07/14/21  1252 07/14/21  1216   PH  --   --   --   --  7.37  --  7.37  --  7.29* 7.36 7.38   PCO2  --   --   --   --   --   --  37  --  44 35 36   PO2   --   --   --   --   --   --  87  --  145* 181* 306*   HCO3  --   --   --   --   --   --  21  --  21 20* 21   O2PER 22 21 21 0  --    < > 21   < > 5 50.0 75.0    < > = values in this interval not displayed.      URINE STUDIES  Recent Labs   Lab Test 08/11/23  0043 03/15/23  0915 01/11/22  0001 09/07/21  1115   COLOR Light Yellow Straw Light Yellow Yellow   APPEARANCE Clear Clear Clear Cloudy*   URINEGLC 300* >=1000* >=1000* 50*   URINEBILI Negative Negative Negative Negative   URINEKETONE Negative Negative Negative Negative   SG 1.009 1.023 1.018 1.022   UBLD Small* Trace* Negative Small*   URINEPH 5.0 5.5 5.0 5.0   PROTEIN 30* 50* Negative >499*   NITRITE Negative Negative Negative Negative   LEUKEST Negative Negative Negative Negative   RBCU 0 1 <1 1   WBCU 3 <1 1 1     Recent Labs   Lab Test 04/20/22  0919 10/04/21  0804 09/07/21  1115 02/26/21  0750 06/29/20  1008 03/02/20  1013 12/02/19  1040 07/08/19  1017 02/04/19  0705   UTPG 0.12 1.46* 1.17* 0.47* 0.89* 0.29* 1.22* 0.11 0.14     PTH  Recent Labs   Lab Test 12/12/23  0941 03/15/23  0906 04/20/22  0912 10/04/21  0802 06/29/20  1005 07/08/19  1020   PTHI 302* 78* 59 81* 61 54     IRON STUDIES   Recent Labs   Lab Test 12/12/23  0941 04/25/22  0925 02/09/22  0850 11/16/21  1004 11/02/21  1031 07/12/21  1608 06/28/21  1347 06/04/21  1236 12/02/20  0739 11/11/20  0754 10/14/20  0836 09/16/20  0802 07/29/20  0749 06/29/20  1005 05/21/20  0723 04/22/20  0833 03/09/20  0823 01/27/20  1340 12/02/19  1034 12/28/18  1108 09/15/18  1215 06/09/17  1250   IRON 43* 119 96 93 109  --  112 158 75 81 73 93 87 60 72 65 92  --  88  --  52  --    * 281 248 211* 241  --  226* 270 228* 227* 248 221* 230* 204* 192* 215* 231*  --  248  --  403  --    IRONSAT 22 42 39 44 45  --  50* 59* 33 36 29 42 38 29 38 30 40  --  36  --  13*  --    QUAN 967* 508* 1,046* 400* 479* 543* 495* 399* 433* 286 323 223 193 352 344 643* 859* 690* 1,353* 90 10* 450*       All above labs reviewed by  me.     Total time spent was 45 minutes, and more than 50% of face to face time was spent in counseling and/or coordination of care regarding principles of multidisciplinary care, medication management, and chronic kidney disease education.  MD Eloy Nelson MD   [phone]

## 2023-12-12 NOTE — PROGRESS NOTES
[facility]: Integrated Behavioral Health  December 12, 2023            Behavioral Health Clinician Progress Note    Patient Name: Frandy Workman           Service Type: Video visit      Service Location:  Video visit      Session Start Time: 3:00 Session End Time: 3:46      Session Length: 38 - 52      Attendees: Patient    Visit Activities (Refresh list every visit): [facility] Only     Service Modality:  Video Visit:      Provider verified identity through the following two step process.  Patient provided:  Patient photo and Patient is known previously to provider    Telemedicine Visit: The patient's condition can be safely assessed and treated via synchronous audio and visual telemedicine encounter.      Reason for Telemedicine Visit: Services only offered telehealth    Originating Site (Patient Location): Patient's home    Distant [place] (Provider Location[place] [facility] [facility] [place]ent:  The patient/guardian has verbally consented to: the potential risks and benefits of telemedicine (video visit) versus in person care; bill my insurance or make self-payment for services provided; and responsibility for payment of non-covered services.     Patient would like the video invitation sent by:  My Chart    Mode of Communication:  Video Conference via [facility]    Distant Location (Provider):  On-site    As the provider I attest to compliance with applicable laws and regulations related to telemedicine.      Diagnostic Assessment Date:  12/03/2020  Treatment Plan Review Date:  11/4/2023  See Flowsheets for today's PHQ-9 and LIZZETTE-7 results  Previous PHQ-9:       9/20/2023     2:45 PM 11/2/2023    11:01 AM 12/12/2023     2:55 PM   PHQ-9 SCORE   PHQ-9 Total Score MyChart 5 (Mild depression) 2 (Minimal depression) 2 (Minimal depression)   PHQ-9 Total Score 5 2 2     Previous LIZZETTE-7:       4/7/2021    12:34 PM 9/30/2021     1:45 PM 5/17/2023     3:37 PM   LIZZETTE-7 SCORE   Total Score 9 (mild  anxiety)     Total Score 9 10 6      11/7/2017  5:23 AM 3/22/2022  1:49 PM 3/21/2023  1:43 PM 6/22/2023  9:09 AM   PROMIS-10 Scores Only       Global Mental Health Score 14  9  9  13    Global Physical Health Score 13  10  9  13    PROMIS TOTAL - SUBSCORES 27  19  18  26       9/14/2023  9:45 AM   PROMIS-10 Scores Only    Global Mental Health Score 15    Global Physical Health Score 12    PROMIS TOTAL - SUBSCORES 27            Extended Session (60+ minutes): No  Interactive Complexity: No  Crisis: No    Treatment Objective(s) Addressed in This Session:  Target Behavior(s): disease management/lifestyle changes   related to adaptive approaches to managing anxious distress and mood difficulties    Depressed mood: Increase interest, pleasure in doing things.      Grief management      Current Stressors / Issues:  [facility] met with Miller today for a follow-up, to help Miller continue to feel supported in his health behavior change and overall mental health.      Discussion today about better identifying and prioritizing values and decreasing time focused on uncontrollable events or areas of relatively less value. Miller identifies how focusing on his health for instance is more important to him than focusing his attention on financial matter/stressors. Discussed what areas of control he has, where he can find a sense of efficacy, like taking good care of himself physically/continuing to exercise for example. Processed ideas around acceptance of things that are outside his control, like family behaviors/past hurts. Ended with talking about progress he feels he is making in counseling.    Progress on Treatment Objective(s) / Homework:  Stable - ACTION (Actively working towards change); Intervened by reinforcing change plan / affirming steps taken      Solution-Focused Therapy  Explore patterns in patient's relationships and discuss options for new behaviors.  Explore patterns in patient's actions and choices and discuss options for new  behaviors.    Behavioral Activation  Discuss steps patient can take to become more involved in meaningful activity.  Identify barriers to these activities and explore possible solutions.    Acceptance and Commitment Therapy  Explore and identify important values in patient's life.  Discuss ways to commit to behavioral activation around these values.      Answers for HPI/ROS submitted by the patient on 3/21/2022  If you checked off any problems, how difficult have these problems made it for you to do your work, take care of things at home, or get along with other people?: Not difficult at all  PHQ9 TOTAL SCORE: 6      Answers for HPI/ROS submitted by the patient on 2/8/2022  If you checked off any problems, how difficult have these problems made it for you to do your work, take care of things at home, or get along with other people?: Somewhat difficult  PHQ9 TOTAL SCORE: 4      Answers for HPI/ROS submitted by the patient on 4/7/2021   LIZZETTE 7 TOTAL SCORE: 9  If you checked off any problems, how difficult have these problems made it for you to do your work, take care of things at home, or get along with other people?: Very difficult  PHQ9 TOTAL SCORE: 7*    *No SI    Answers for HPI/ROS submitted by the patient on 10/13/2020   If you checked off any problems, how difficult have these problems made it for you to do your work, take care of things at home, or get along with other people?: Somewhat difficult  PHQ9 TOTAL SCORE: 8*  LIZZETTE 7 TOTAL SCORE: 6      *No SI     Answers for HPI/ROS submitted by the patient on 12/29/2020   If you checked off any problems, how difficult have these problems made it for you to do your work, take care of things at home, or get along with other people?: Somewhat difficult  PHQ9 TOTAL SCORE: 5*  LIZZETTE 7 TOTAL SCORE: 8    *No SI     Answers for HPI/ROS submitted by the patient on 11/21/2022  If you checked off any problems, how difficult have these problems made it for you to do your work, take  care of things at home, or get along with other people?: Very difficult  PHQ9 TOTAL SCORE: 4          Care Plan review completed: no    Medication Review:  No changes to current psychiatric medication(s)     Reports discontinuing zolpidem.       Current Outpatient Medications   Medication    albuterol (PROAIR HFA/PROVENTIL HFA/VENTOLIN HFA) 108 (90 Base) MCG/ACT inhaler    Alcohol Swabs (ALCOHOL PREP) 70 % PADS    ammonium lactate (AMLACTIN) 12 % external cream    apixaban ANTICOAGULANT (ELIQUIS) 5 MG tablet    BD VIKTORIA U/F 32G X 4 MM insulin pen needle    benzoyl peroxide 5 % external liquid    blood glucose (NO BRAND SPECIFIED) lancets standard    Continuous Blood Gluc Sensor (FREESTYLE CLAUDIA 2 SENSOR) MISC    empagliflozin (JARDIANCE) 10 MG TABS tablet    insulin aspart (NOVOLOG PEN) 100 UNIT/ML pen    insulin degludec (TRESIBA FLEXTOUCH) 100 UNIT/ML pen    K-Phos-Neutral [id number] MG tablet    ketoconazole (NIZORAL) 2 % external shampoo    lamiVUDine (EPIVIR) 100 MG tablet    lisinopril (ZESTRIL) 5 MG tablet    loperamide (IMODIUM A-D) 2 MG tablet    magnesium oxide (MAG-OX) 400 MG tablet    metoprolol succinate ER (TOPROL XL) 25 MG 24 hr tablet    Multiple Vitamin (TAB-A-GLADIS) TABS    mycophenolate (GENERIC EQUIVALENT) 250 MG capsule    NIFEdipine ER OSMOTIC (PROCARDIA XL) 60 MG 24 hr tablet    ondansetron (ZOFRAN ODT) 8 MG ODT tab    pantoprazole (PROTONIX) 40 MG EC tablet    predniSONE (DELTASONE) 5 MG tablet    rosuvastatin (CRESTOR) 20 MG tablet    SORAfenib (NEXAVAR) 200 MG tablet    SORAfenib (NEXAVAR) 200 MG tablet    tamsulosin (FLOMAX) 0.4 MG capsule    Vitamin D3 50 mcg (2000 units) tablet     Current Facility-Administered Medications   Medication    aflibercept (EYLEA) injection prefilled syringe 2 mg    aflibercept (EYLEA) injection prefilled syringe 2 mg    dexamethasone (OZURDEX) intravitreal implant 0.7 mg    dexamethasone (OZURDEX) intravitreal implant 0.7 mg    faricimab-svoa (VABYSMO)  intravitreal injection 6 mg    faricimab-svoa (VABYSMO) intravitreal injection 6 mg    faricimab-svoa (VABYSMO) intravitreal injection 6 mg    lidocaine (PF) (XYLOCAINE) injection 1 mL       Medication Compliance:  Yes    Changes in Health Issues:   None reported    Chemical Use Review:   Substance Use: Chemical use reviewed, no active concerns identified      Tobacco Use: No current tobacco use.         Assessment: Current Emotional / Mental Status (status of significant symptoms):  Risk status (Self / Other harm or suicidal ideation)  Patient denies a history of suicidal ideation, suicide attempts, self-injurious behavior, homicidal ideation, homicidal behavior and and other safety concerns     Patient denies current fears or concerns for personal safety.  Patient denies current or recent suicidal ideation or behaviors.  Patient denies current or recent homicidal ideation or behaviors.  Patient denies current or recent self injurious behavior or ideation.  Patient denies other safety concerns.     A safety and risk management plan has not been developed at this time, however patient was encouraged to call [place] [phone] should there be a change in any of these risk factors.    [place] Suicide Severity Rating Scale (Short Version)      7/1/2020    11:00 AM 7/12/2021     2:30 PM 1/10/2022     9:28 PM 1/3/2023     6:31 AM 1/24/2023     6:22 AM 7/13/2023    10:31 AM 8/10/2023     3:31 PM   [place] [facility] Suicide Severity Rating (Short Version)   Over the past 2 weeks have you felt down, depressed, or hopeless? no no no no no no no   Over the past 2 weeks have you had thoughts of killing yourself? no no no no  no no   Have you ever attempted to kill yourself? no no no no  no no   Q1 Wished to be Dead (Past Month)    no      Q2 Suicidal Thoughts (Past Month)    no      Q3 Suicidal Thought Method    no      Q4 Suicidal Intent without Specific Plan    no      Q5 Suicide Intent with Specific Plan    no      Q6 Suicide  Behavior (Lifetime)    no      Level of Risk per Screen    low risk            Appearance:   Appropriate   Eye Contact:   Good   Psychomotor Behavior: TD evident - improving  Attitude:   Cooperative  Interested Friendly Pleasant  Orientation:   All  Speech   Rate / Production: Normal/ Responsive   Volume:  Normal   Mood:    Depressed ; improving  Affect:    Appropriate    Thought Content:  Clear   Thought Form:  Goal Directed  Logical   Insight:    Good     Diagnoses:  1. Bipolar 1 disorder, depressed, mild (H)    2. Generalized anxiety disorder              Collateral Reports Completed:  Not Applicable    Plan: (Homework, other):  Continue with this writer for individual psychotherapy in 2/3 wks. He will continue to work on managing depression and anxiety through achievable behavioral activation ideas.Information about grief management given today.  No CD issues.     Joseph Murillo Psy.D, LP   Behavioral Health Clinician   [facility]          ______________________________________________________________________                                            Individual Treatment Plan    Patient's Name: Frandy Workman  YOB: 1965    Date of Creation: 3/1/2022  Date Treatment Plan Last Reviewed/Revised: 11/3/2023    DSM5 Diagnoses: 296.51 Bipolar I Disorder Current or Most Recent Episode Depressed, Mild or 300.02 (F41.1) Generalized Anxiety Disorder  Psychosocial / Contextual Factors: Post Solid Organ Tx  PROMIS (reviewed every 90 days):     11/7/2017  5:23 AM 3/22/2022  1:49 PM 3/21/2023  1:43 PM 6/22/2023  9:09 AM   PROMIS-10 Scores Only       Global Mental Health Score 14  9  9  13    Global Physical Health Score 13  10  9  13    PROMIS TOTAL - SUBSCORES 27  19  18  26       9/14/2023  9:45 AM   PROMIS-10 Scores Only    Global Mental Health Score 15    Global Physical Health Score 12    PROMIS TOTAL - SUBSCORES 27          Referral / Collaboration:  Referral to another  professional/service is not indicated at this time..    Anticipated number of session for this episode of care: 4-6  Anticipation frequency of session: Every other week  Anticipated Duration of each session: 38-52 minutes  Treatment plan will be reviewed in 90 days or when goals have been changed.       MeasurableTreatment Goal(s) related to diagnosis / functional impairment(s)  Goal 1: Patient will experience a reduction in depressive symptoms along with a corresponding increase in positive emotion and life satisfaction.      Objective #A (Patient Action)    Patient will Increase interest, engagement, and pleasure in doing things.  Status: Continued - Date(s): 11/3/2023    Intervention(s)  Therapist will help patient identify pleasant and mastery oriented events that elicit positive, relaxed mood.    Objective #B  Patient will Decrease frequency and intensity of feeling down, depressed, hopeless.  Status: Continued - Date(s): 11/3/2023    Intervention(s)  Therapist will introduce patient to cognitive-behavioral and acceptance and commitment therapy topics aimed to help reduce depression and anxiety    Objective #C  Patient will Identify negative self-talk and behaviors: challenge core beliefs, myths, and actions  Improve concentration, focus, and mindfulness in daily activities .  Status: Continued - Date(s): COMPLETE    Intervention(s)  Therapist will help patient identify and manage negative self-talk and automatic thoughts; introduce patient to cognitive distortions; help patient develop cognitive diffusion techniques      Goal 2: Patient will experience a reduction in anxious symptoms, along with a corresponding increase in relaxed emotional states and life satisfaction.      Objective #A (Patient Action)  Patient will use cognitive-behavioral and thought diffusion strategies identified in therapy to challenge anxious thoughts.    Status: Continued - Date(s): COMPLETE    Intervention(s)  Therapist will utilize  "CBT and ACT ideas to help patient challenge anxious thoughts and reduce intensity/duration of anxious distress    Objective #B  Patient will use \"worry time\" each day for 15 minutes of scheduled worry and then defer obsessive or anxious thinking until the next structured worry time.    Status: Continued - Date(s): COMPLETE    Intervention(s)  Therapist will teach patient how to effectively utilize worry time and/or thought logs/journals each day and incorporate more relaxation behaviors into their routine.    Objective #C  Patient will identify the stressors which contribute to feelings of anxiety  Patient will increase engagement in adaptive coping skills and recreational activities, such as exercise and healthy socialization, to manage distress.  Status: Continued - Date(s): COMPLETE    Intervention(s)  Therapist will help patient identify triggers/situational factors that contribute to anxiety and behavioral skills aimed to manage anxious distress.      Goal 3: Patiient will work toward adaptively managing bipolar-related depression and manic episodes      Objective #A (Patient Action)    Status: Continued - Date(s): COMPLETE    Patient will identify 2-3 signs or signals of emerging mood instability.    Intervention(s)  Therapist will provide educational materials on bipolar disorder and help identifying triggers for mood instability .    Objective #B  Patient will identify 2-3 strategies for more effectively managing Bipolar Disorder.    Status: Continued - Date(s): COMPLETE    Intervention(s)  Therapist will teach emotional recognition/identification. Skills aimed to effectively manage bipolar disorder .      Other Possible Therapeutic Intervention(s):    Psycho-education regarding mental health diagnoses and treatment options    Supportive Therapy  Provide affirmations, reflections, and establish working rapport  Emphasize and reflect on strength of therapeutic relationship    Skills training  Explore skills " useful to client in current situation.  Skills include assertiveness, communication, conflict management, problem-solving, relaxation, etc.    Solution-Focused Therapy  Explore patterns in patient's relationships and discuss options for new behaviors.  Explore patterns in patient's actions and choices and discuss options for new behaviors.    Cognitive-behavioral Therapy  Discuss common cognitive distortions, identify them in patient's life.  Explore ways to challenge, replace, and act against these cognitions.    Acceptance and Commitment Therapy  Explore and identify important values in patient's life.  Discuss ways to commit to behavioral activation around these values.    Psychodynamic psychotherapy  Discuss patient's emotional dynamics and issues and how they impact behaviors.  Explore patient's history of relationships and how they impact present behaviors.  Explore how to work with and make changes in these schemas and patterns.    Narrative Therapy  Explore the patient's story of his/her life from his/her perspective.  Explore alternate ways of understanding their experience, identifying exceptions, developing new themes.    Interpersonal Psychotherapy  Explore patterns in relationships that are effective or ineffective at helping patient reach their goals, find satisfying experience.  Discuss new patterns or behaviors to engage in for improved social functioning.    Behavioral Activation  Discuss steps patient can take to become more involved in meaningful activity.  Identify barriers to these activities and explore possible solutions.    Mindfulness-Based Strategies  Discuss skills based on development and application of mindfulness.  Skills drawn from compassion-focused therapy, dialectical behavior therapy, mindfulness-based stress reduction, mindfulness-based cognitive therapy, etc.      Patient has reviewed and agreed to the above plan.      Joseph Murillo Psy.D, LP   Behavioral Health Clinician    [facility] [place]     11/3/2023    Answers for HPI/ROS submitted by the patient on 2/28/2023  If you checked off any problems, how difficult have these problems made it for you to do your work, take care of things at home, or get along with other people?: Very difficult  PHQ9 TOTAL SCORE: 6    Answers for HPI/ROS submitted by the patient on 6/28/2023  If you checked off any problems, how difficult have these problems made it for you to do your work, take care of things at home, or get along with other people?: Somewhat difficult  PHQ9 TOTAL SCORE: 3Answers submitted by the patient for this visit:  Patient Health Questionnaire (Submitted on 9/20/2023)  If you checked off any problems, how difficult have these problems made it for you to do your work, take care of things at home, or get along with other people?: Somewhat difficult  PHQ9 TOTAL SCORE: 5  Answers submitted by the patient for this visit:  Patient Health Questionnaire (Submitted on 11/2/2023)  If you checked off any problems, how difficult have these problems made it for you to do your work, take care of things at home, or get along with other people?: Somewhat difficult  PHQ9 TOTAL SCORE: 2

## 2023-12-12 NOTE — PATIENT INSTRUCTIONS
-Please start empagliflozin at 10 mg daily  -Please continue tro measure your blood pressure 2x/day  -Please have a renal panel drawn in 2 weeks  -Please return to clinic in 6 months

## 2023-12-12 NOTE — NURSING NOTE
Chief Complaint   Patient presents with    RECHECK     Follow up     /74   Pulse 85   Temp 98.6  F (37  C) (Oral)   Wt 76.8 kg (169 lb 4.8 oz)   SpO2 100%   BMI 25.00 kg/m    Eulalia Victoria on 12/12/2023 at 10:02 AM     show

## 2023-12-14 ENCOUNTER — DOCUMENTATION ONLY (OUTPATIENT)
Dept: SLEEP MEDICINE | Facility: CLINIC | Age: 58
End: 2023-12-14
Payer: MEDICARE

## 2023-12-14 NOTE — PROGRESS NOTES
[facility] Recheck:  Patient he has so many medical problems.  He was having a lot of noses bleeds when he was using his nasal CPAP mask and his Providers told him to stop using.  Patient nose bleeds have gone away and now he is ready to try a full face mask.  Patient will reach out to [facility].     Hydroxychloroquine Pregnancy And Lactation Text: This medication has been shown to cause fetal harm but it isn't assigned a Pregnancy Risk Category. There are small amounts excreted in breast milk.

## 2023-12-16 ENCOUNTER — HEALTH MAINTENANCE LETTER (OUTPATIENT)
Age: 58
End: 2023-12-16

## 2023-12-17 DIAGNOSIS — I12.9 HYPERTENSION, RENAL: ICD-10-CM

## 2023-12-17 RX ORDER — NIFEDIPINE 60 MG/1
TABLET, EXTENDED RELEASE ORAL
Qty: 6 TABLET | Refills: 0 | Status: SHIPPED | OUTPATIENT
Start: 2023-12-17

## 2023-12-18 ENCOUNTER — LAB (OUTPATIENT)
Dept: LAB | Facility: CLINIC | Age: 58
End: 2023-12-18
Payer: MEDICARE

## 2023-12-18 ENCOUNTER — TELEPHONE (OUTPATIENT)
Dept: TRANSPLANT | Facility: CLINIC | Age: 58
End: 2023-12-18

## 2023-12-18 ENCOUNTER — MYC MEDICAL ADVICE (OUTPATIENT)
Dept: ENDOCRINOLOGY | Facility: CLINIC | Age: 58
End: 2023-12-18

## 2023-12-18 DIAGNOSIS — E11.3293 TYPE 2 DIABETES MELLITUS WITH MILD NONPROLIFERATIVE RETINOPATHY OF BOTH EYES WITHOUT MACULAR EDEMA, UNSPECIFIED WHETHER LONG TERM INSULIN USE (H): ICD-10-CM

## 2023-12-18 DIAGNOSIS — E11.3293 TYPE 2 DIABETES MELLITUS WITH MILD NONPROLIFERATIVE RETINOPATHY OF BOTH EYES WITHOUT MACULAR EDEMA, UNSPECIFIED WHETHER LONG TERM INSULIN USE (H): Primary | ICD-10-CM

## 2023-12-18 DIAGNOSIS — Z94.4 LIVER REPLACED BY TRANSPLANT (H): ICD-10-CM

## 2023-12-18 LAB
ALBUMIN SERPL BCG-MCNC: 4.5 G/DL (ref 3.5–5.2)
ALP SERPL-CCNC: 101 U/L (ref 40–150)
ALT SERPL W P-5'-P-CCNC: 19 U/L (ref 0–70)
ANION GAP SERPL CALCULATED.3IONS-SCNC: 12 MMOL/L (ref 7–15)
AST SERPL W P-5'-P-CCNC: 24 U/L (ref 0–45)
BILIRUB DIRECT SERPL-MCNC: <0.2 MG/DL (ref 0–0.3)
BILIRUB SERPL-MCNC: 0.4 MG/DL
BUN SERPL-MCNC: 35.5 MG/DL (ref 8–23)
CALCIUM SERPL-MCNC: 9.4 MG/DL (ref 8.6–10)
CHLORIDE SERPL-SCNC: 103 MMOL/L (ref 98–107)
CREAT SERPL-MCNC: 1.38 MG/DL (ref 0.67–1.17)
DEPRECATED CALCIDIOL+CALCIFEROL SERPL-MC: <53 UG/L (ref 20–75)
DEPRECATED HCO3 PLAS-SCNC: 23 MMOL/L (ref 22–29)
EGFRCR SERPLBLD CKD-EPI 2021: 59 ML/MIN/1.73M2
ERYTHROCYTE [DISTWIDTH] IN BLOOD BY AUTOMATED COUNT: 14.5 % (ref 10–15)
GLUCOSE SERPL-MCNC: 128 MG/DL (ref 70–99)
HBA1C MFR BLD: 5.7 %
HCT VFR BLD AUTO: 33.4 % (ref 40–53)
HGB BLD-MCNC: 10.4 G/DL (ref 13.3–17.7)
MCH RBC QN AUTO: 30.8 PG (ref 26.5–33)
MCHC RBC AUTO-ENTMCNC: 31.1 G/DL (ref 31.5–36.5)
MCV RBC AUTO: 99 FL (ref 78–100)
PLATELET # BLD AUTO: 153 10E3/UL (ref 150–450)
POTASSIUM SERPL-SCNC: 6.1 MMOL/L (ref 3.4–5.3)
PROT SERPL-MCNC: 7.5 G/DL (ref 6.4–8.3)
RBC # BLD AUTO: 3.38 10E6/UL (ref 4.4–5.9)
SODIUM SERPL-SCNC: 138 MMOL/L (ref 135–145)
VITAMIN D2 SERPL-MCNC: <5 UG/L
VITAMIN D3 SERPL-MCNC: 48 UG/L
WBC # BLD AUTO: 6 10E3/UL (ref 4–11)

## 2023-12-18 PROCEDURE — 99000 SPECIMEN HANDLING OFFICE-LAB: CPT | Performed by: PATHOLOGY

## 2023-12-18 PROCEDURE — 83036 HEMOGLOBIN GLYCOSYLATED A1C: CPT | Performed by: PHYSICIAN ASSISTANT

## 2023-12-18 PROCEDURE — 82248 BILIRUBIN DIRECT: CPT | Performed by: PATHOLOGY

## 2023-12-18 PROCEDURE — 36415 COLL VENOUS BLD VENIPUNCTURE: CPT | Performed by: PATHOLOGY

## 2023-12-18 PROCEDURE — 85027 COMPLETE CBC AUTOMATED: CPT | Performed by: PATHOLOGY

## 2023-12-18 PROCEDURE — 80053 COMPREHEN METABOLIC PANEL: CPT | Performed by: PATHOLOGY

## 2023-12-18 NOTE — TELEPHONE ENCOUNTER
DATE:  12/18/2023     TIME OF RECEIPT FROM LAB:  3:12    ORDERING PROVIDER: daisha    LAB TEST:  K+    LAB VALUE:  6.1    RESULTS GIVEN WITH READ-BACK TO (PROVIDER):  Radha Ndiaye    TIME LAB VALUE REPORTED TO PROVIDER:   3:12

## 2023-12-19 DIAGNOSIS — Z95.1 S/P CABG (CORONARY ARTERY BYPASS GRAFT): ICD-10-CM

## 2023-12-19 DIAGNOSIS — N18.32 STAGE 3B CHRONIC KIDNEY DISEASE (CKD) (H): Primary | ICD-10-CM

## 2023-12-19 DIAGNOSIS — E87.5 HYPERKALEMIA: Primary | ICD-10-CM

## 2023-12-20 DIAGNOSIS — L73.9 FOLLICULITIS: ICD-10-CM

## 2023-12-21 ENCOUNTER — DOCUMENTATION ONLY (OUTPATIENT)
Dept: SLEEP MEDICINE | Facility: CLINIC | Age: 58
End: 2023-12-21
Payer: MEDICARE

## 2023-12-21 DIAGNOSIS — G47.33 OSA (OBSTRUCTIVE SLEEP APNEA): Primary | ICD-10-CM

## 2023-12-22 RX ORDER — KETOCONAZOLE 20 MG/ML
SHAMPOO TOPICAL
Qty: 120 ML | Refills: 11 | Status: SHIPPED | OUTPATIENT
Start: 2023-12-22

## 2023-12-22 NOTE — TELEPHONE ENCOUNTER
ketoconazole (NIZORAL) 2 % external shampoo   Last Written Prescription Date:  12/1/2022  Last Fill Quantity: 120,   # refills: 11  Last Office Visit : 11/2/2023  Future Office visit:  5/16/2024  120 mL, 11 Refills sent to eve Chung RN  Central Triage Red Flags/Med Refills

## 2023-12-25 DIAGNOSIS — C22.0 HCC (HEPATOCELLULAR CARCINOMA) (H): Primary | ICD-10-CM

## 2023-12-25 DIAGNOSIS — C78.6 MALIGNANT NEOPLASM METASTATIC TO PERITONEUM (H): ICD-10-CM

## 2023-12-26 ENCOUNTER — DOCUMENTATION ONLY (OUTPATIENT)
Dept: SLEEP MEDICINE | Facility: CLINIC | Age: 58
End: 2023-12-26
Payer: MEDICARE

## 2023-12-26 ENCOUNTER — LAB (OUTPATIENT)
Dept: LAB | Facility: CLINIC | Age: 58
End: 2023-12-26
Payer: MEDICARE

## 2023-12-26 DIAGNOSIS — E11.29 TYPE 2 DIABETES MELLITUS WITH MICROALBUMINURIA, WITH LONG-TERM CURRENT USE OF INSULIN (H): ICD-10-CM

## 2023-12-26 DIAGNOSIS — R80.9 TYPE 2 DIABETES MELLITUS WITH MICROALBUMINURIA, WITH LONG-TERM CURRENT USE OF INSULIN (H): ICD-10-CM

## 2023-12-26 DIAGNOSIS — Z79.4 TYPE 2 DIABETES MELLITUS WITH MICROALBUMINURIA, WITH LONG-TERM CURRENT USE OF INSULIN (H): ICD-10-CM

## 2023-12-26 LAB
ALBUMIN SERPL BCG-MCNC: 4.5 G/DL (ref 3.5–5.2)
ANION GAP SERPL CALCULATED.3IONS-SCNC: 10 MMOL/L (ref 7–15)
BUN SERPL-MCNC: 30.6 MG/DL (ref 8–23)
CALCIUM SERPL-MCNC: 9.4 MG/DL (ref 8.6–10)
CHLORIDE SERPL-SCNC: 103 MMOL/L (ref 98–107)
CREAT SERPL-MCNC: 1.36 MG/DL (ref 0.67–1.17)
DEPRECATED HCO3 PLAS-SCNC: 25 MMOL/L (ref 22–29)
EGFRCR SERPLBLD CKD-EPI 2021: 60 ML/MIN/1.73M2
GLUCOSE SERPL-MCNC: 144 MG/DL (ref 70–99)
PHOSPHATE SERPL-MCNC: 2.6 MG/DL (ref 2.5–4.5)
POTASSIUM SERPL-SCNC: 5 MMOL/L (ref 3.4–5.3)
SODIUM SERPL-SCNC: 138 MMOL/L (ref 135–145)

## 2023-12-26 PROCEDURE — 36415 COLL VENOUS BLD VENIPUNCTURE: CPT | Performed by: PATHOLOGY

## 2023-12-26 PROCEDURE — 80069 RENAL FUNCTION PANEL: CPT | Performed by: PATHOLOGY

## 2023-12-26 NOTE — PROGRESS NOTES
[facility] Recheck: Patient started using CPAP again on 12/222023 he stated everything going well with CPAP so far.  He stated he will reach out to me if he has questions or concerns.

## 2023-12-27 DIAGNOSIS — C22.0 HCC (HEPATOCELLULAR CARCINOMA) (H): Primary | ICD-10-CM

## 2023-12-27 DIAGNOSIS — C78.6 MALIGNANT NEOPLASM METASTATIC TO PERITONEUM (H): ICD-10-CM

## 2023-12-27 RX ORDER — SORAFENIB 200 MG/1
200 TABLET, FILM COATED ORAL 2 TIMES DAILY
Qty: 60 TABLET | Refills: 0 | Status: SHIPPED | OUTPATIENT
Start: 2024-01-03 | End: 2024-01-17

## 2023-12-27 RX ORDER — SORAFENIB 200 MG/1
TABLET, FILM COATED ORAL
Qty: 120 TABLET | OUTPATIENT
Start: 2023-12-27

## 2023-12-29 DIAGNOSIS — L73.9 FOLLICULITIS: ICD-10-CM

## 2024-01-02 ENCOUNTER — VIRTUAL VISIT (OUTPATIENT)
Dept: BEHAVIORAL HEALTH | Facility: CLINIC | Age: 59
End: 2024-01-02
Payer: MEDICARE

## 2024-01-02 DIAGNOSIS — F31.31 BIPOLAR 1 DISORDER, DEPRESSED, MILD (H): Primary | ICD-10-CM

## 2024-01-02 DIAGNOSIS — F41.1 GENERALIZED ANXIETY DISORDER: ICD-10-CM

## 2024-01-02 PROCEDURE — 90834 PSYTX W PT 45 MINUTES: CPT | Mod: 95 | Performed by: PSYCHOLOGIST

## 2024-01-03 DIAGNOSIS — E11.3593 PROLIFERATIVE DIABETIC RETINOPATHY OF BOTH EYES ASSOCIATED WITH TYPE 2 DIABETES MELLITUS, UNSPECIFIED PROLIFERATIVE RETINOPATHY TYPE (H): Primary | ICD-10-CM

## 2024-01-03 NOTE — TELEPHONE ENCOUNTER
Process 1    benzoyl peroxide 5 % external liquid     Topical Acne Medications     Americo Cespedes MD  Dermatology    11/2/2023  [facility]

## 2024-01-04 NOTE — PROGRESS NOTES
[facility]: Integrated Behavioral Health  January 2, 2024          Behavioral Health Clinician Progress Note    Patient Name: Frandy Workman           Service Type: Video visit      Service Location:  Video visit      Session Start Time: 3:00 Session End Time: 3:45      Session Length: 38 - 52      Attendees: Patient    Visit Activities (Refresh list every visit): [facility] Only     Service Modality:  Video Visit:      Provider verified identity through the following two step process.  Patient provided:  Patient photo and Patient is known previously to provider    Telemedicine Visit: The patient's condition can be safely assessed and treated via synchronous audio and visual telemedicine encounter.      Reason for Telemedicine Visit: Services only offered telehealth    Originating Site (Patient Location): Patient's home    Distant Site (Provider Location): [facility]: [facility] [place] [facility]    Consent:  The patient/guardian has verbally consented to: the potential risks and benefits of telemedicine (video visit) versus in person care; bill my insurance or make self-payment for services provided; and responsibility for payment of non-covered services.     Patient would like the video invitation sent by:  My Chart    Mode of Communication:  Video Conference via [facility]    Distant Location (Provider):  On-site    As the provider I attest to compliance with applicable laws and regulations related to telemedicine.      Diagnostic Assessment Date:  12/03/2020  Treatment Plan Review Date:  2/11/2024  See Flowsheets for today's PHQ-9 and LIZZETTE-7 results  Previous PHQ-9:       9/20/2023     2:45 PM 11/2/2023    11:01 AM 12/12/2023     2:55 PM   PHQ-9 SCORE   PHQ-9 Total Score MyChart 5 (Mild depression) 2 (Minimal depression) 2 (Minimal depression)   PHQ-9 Total Score 5 2 2     Previous LIZZETTE-7:       4/7/2021    12:34 PM 9/30/2021     1:45 PM 5/17/2023     3:37 PM   LIZZETTE-7 SCORE   Total Score 9 (mild  anxiety)     Total Score 9 10 6      11/7/2017  5:23 AM 3/22/2022  1:49 PM 3/21/2023  1:43 PM 6/22/2023  9:09 AM   PROMIS-10 Scores Only       Global Mental Health Score 14  9  9  13    Global Physical Health Score 13  10  9  13    PROMIS TOTAL - SUBSCORES 27  19  18  26       9/14/2023  9:45 AM   PROMIS-10 Scores Only    Global Mental Health Score 15    Global Physical Health Score 12    PROMIS TOTAL - SUBSCORES 27            Extended Session (60+ minutes): No  Interactive Complexity: No  Crisis: No    Treatment Objective(s) Addressed in This Session:  Target Behavior(s): disease management/lifestyle changes   related to adaptive approaches to managing anxious distress and mood difficulties    Depressed mood: Increase interest, pleasure in doing things.      Grief management     Relationship conflict      Current Stressors / Issues:  [facility] met with Miller today for a follow-up, to help Miller continue to feel supported in his health behavior change and overall mental health.      Discussion today about recent relationship conflict with a long-term friend of his. Explored/identified emotional themes and discussed the notion of rumination, how this affects overall mental health and quality of life. Reviewed coping strategies for rumination, such as keeping busy with things he can control like walking/exercise and managing his overall health.     Progress on Treatment Objective(s) / Homework:  Stable - ACTION (Actively working towards change); Intervened by reinforcing change plan / affirming steps taken      Solution-Focused Therapy  Explore patterns in patient's relationships and discuss options for new behaviors.  Explore patterns in patient's actions and choices and discuss options for new behaviors.    Behavioral Activation  Discuss steps patient can take to become more involved in meaningful activity.  Identify barriers to these activities and explore possible solutions.    Acceptance and Commitment Therapy  Explore and  identify important values in patient's life.  Discuss ways to commit to behavioral activation around these values.      Answers for HPI/ROS submitted by the patient on 3/21/2022  If you checked off any problems, how difficult have these problems made it for you to do your work, take care of things at home, or get along with other people?: Not difficult at all  PHQ9 TOTAL SCORE: 6      Answers for HPI/ROS submitted by the patient on 2/8/2022  If you checked off any problems, how difficult have these problems made it for you to do your work, take care of things at home, or get along with other people?: Somewhat difficult  PHQ9 TOTAL SCORE: 4      Answers for HPI/ROS submitted by the patient on 4/7/2021   LIZZETTE 7 TOTAL SCORE: 9  If you checked off any problems, how difficult have these problems made it for you to do your work, take care of things at home, or get along with other people?: Very difficult  PHQ9 TOTAL SCORE: 7*    *No SI    Answers for HPI/ROS submitted by the patient on 10/13/2020   If you checked off any problems, how difficult have these problems made it for you to do your work, take care of things at home, or get along with other people?: Somewhat difficult  PHQ9 TOTAL SCORE: 8*  LIZZETTE 7 TOTAL SCORE: 6      *No SI     Answers for HPI/ROS submitted by the patient on 12/29/2020   If you checked off any problems, how difficult have these problems made it for you to do your work, take care of things at home, or get along with other people?: Somewhat difficult  PHQ9 TOTAL SCORE: 5*  LIZZETTE 7 TOTAL SCORE: 8    *No SI     Answers for HPI/ROS submitted by the patient on 11/21/2022  If you checked off any problems, how difficult have these problems made it for you to do your work, take care of things at home, or get along with other people?: Very difficult  PHQ9 TOTAL SCORE: 4          Care Plan review completed: no    Medication Review:  No changes to current psychiatric medication(s)     Reports discontinuing  zolpidem.       Current Outpatient Medications   Medication    albuterol (PROAIR HFA/PROVENTIL HFA/VENTOLIN HFA) 108 (90 Base) MCG/ACT inhaler    Alcohol Swabs (ALCOHOL PREP) 70 % PADS    ammonium lactate (AMLACTIN) 12 % external cream    apixaban ANTICOAGULANT (ELIQUIS) 5 MG tablet    BD VIKTORIA U/F 32G X 4 MM insulin pen needle    benzoyl peroxide 5 % external liquid    blood glucose (NO BRAND SPECIFIED) lancets standard    Continuous Blood Gluc Sensor (FREESTYLE CLAUDIA 2 SENSOR) MISC    empagliflozin (JARDIANCE) 10 MG TABS tablet    insulin aspart (NOVOLOG PEN) 100 UNIT/ML pen    insulin degludec (TRESIBA FLEXTOUCH) 100 UNIT/ML pen    K-Phos-Neutral [id number] MG tablet    ketoconazole (NIZORAL) 2 % external shampoo    lamiVUDine (EPIVIR) 100 MG tablet    lisinopril (ZESTRIL) 5 MG tablet    loperamide (IMODIUM A-D) 2 MG tablet    magnesium oxide (MAG-OX) 400 MG tablet    metoprolol succinate ER (TOPROL XL) 25 MG 24 hr tablet    Multiple Vitamin (TAB-A-GLADIS) TABS    mycophenolate (GENERIC EQUIVALENT) 250 MG capsule    NIFEdipine ER OSMOTIC (PROCARDIA XL) 60 MG 24 hr tablet    ondansetron (ZOFRAN ODT) 8 MG ODT tab    pantoprazole (PROTONIX) 40 MG EC tablet    predniSONE (DELTASONE) 5 MG tablet    rosuvastatin (CRESTOR) 20 MG tablet    sodium zirconium cyclosilicate (LOKELMA) 10 g PACK packet    SORAfenib (NEXAVAR) 200 MG tablet    tamsulosin (FLOMAX) 0.4 MG capsule    Vitamin D3 50 mcg (2000 units) tablet     Current Facility-Administered Medications   Medication    aflibercept (EYLEA) injection prefilled syringe 2 mg    aflibercept (EYLEA) injection prefilled syringe 2 mg    dexamethasone (OZURDEX) intravitreal implant 0.7 mg    dexamethasone (OZURDEX) intravitreal implant 0.7 mg    faricimab-svoa (VABYSMO) intravitreal injection 6 mg    faricimab-svoa (VABYSMO) intravitreal injection 6 mg    faricimab-svoa (VABYSMO) intravitreal injection 6 mg    lidocaine (PF) (XYLOCAINE) injection 1 mL       Medication  Compliance:  Yes    Changes in Health Issues:   None reported    Chemical Use Review:   Substance Use: Chemical use reviewed, no active concerns identified      Tobacco Use: No current tobacco use.         Assessment: Current Emotional / Mental Status (status of significant symptoms):  Risk status (Self / Other harm or suicidal ideation)  Patient denies a history of suicidal ideation, suicide attempts, self-injurious behavior, homicidal ideation, homicidal behavior and and other safety concerns     Patient denies current fears or concerns for personal safety.  Patient denies current or recent suicidal ideation or behaviors.  Patient denies current or recent homicidal ideation or behaviors.  Patient denies current or recent self injurious behavior or ideation.  Patient denies other safety concerns.     A safety and risk management plan has not been developed at this time, however patient was encouraged to call [place] [phone] should there be a change in any of these risk factors.    [place] Suicide Severity Rating Scale (Short Version)      7/1/2020    11:00 AM 7/12/2021     2:30 PM 1/10/2022     9:28 PM 1/3/2023     6:31 AM 1/24/2023     6:22 AM 7/13/2023    10:31 AM 8/10/2023     3:31 PM   [place] Suicide Severity Rating (Short Version)   Over the past 2 weeks have you felt down, depressed, or hopeless? no no no no no no no   Over the past 2 weeks have you had thoughts of killing yourself? no no no no  no no   Have you ever attempted to kill yourself? no no no no  no no   Q1 Wished to be Dead (Past Month)    no      Q2 Suicidal Thoughts (Past Month)    no      Q3 Suicidal Thought Method    no      Q4 Suicidal Intent without Specific Plan    no      Q5 Suicide Intent with Specific Plan    no      Q6 Suicide Behavior (Lifetime)    no      Level of Risk per Screen    low risk            Appearance:   Appropriate   Eye Contact:   Good   Psychomotor Behavior: TD evident - improving  Attitude:   Cooperative   Interested Friendly Pleasant  Orientation:   All  Speech   Rate / Production: Normal/ Responsive   Volume:  Normal   Mood:    Depressed ; improving  Affect:    Appropriate    Thought Content:  Clear   Thought Form:  Goal Directed  Logical   Insight:    Good     Diagnoses:  1. Bipolar 1 disorder, depressed, mild (H)    2. Generalized anxiety disorder                Collateral Reports Completed:  Not Applicable    Plan: (Homework, other):  Continue with this writer for individual psychotherapy in 2/3 wks. He will continue to work on managing depression and anxiety through achievable behavioral activation ideas.Information about grief management given today.  No CD issues.     Joseph Murillo Psy.D, LP   Behavioral Health Clinician   [facility]          ______________________________________________________________________                                            Individual Treatment Plan    Patient's Name: Frandy Workman  YOB: 1965    Date of Creation: 3/1/2022  Date Treatment Plan Last Reviewed/Revised: 11/3/2023    DSM5 Diagnoses: 296.51 Bipolar I Disorder Current or Most Recent Episode Depressed, Mild or 300.02 (F41.1) Generalized Anxiety Disorder  Psychosocial / Contextual Factors: Post Solid Organ Tx  PROMIS (reviewed every 90 days):     11/7/2017  5:23 AM 3/22/2022  1:49 PM 3/21/2023  1:43 PM 6/22/2023  9:09 AM   PROMIS-10 Scores Only       Global Mental Health Score 14  9  9  13    Global Physical Health Score 13  10  9  13    PROMIS TOTAL - SUBSCORES 27  19  18  26       9/14/2023  9:45 AM   PROMIS-10 Scores Only    Global Mental Health Score 15    Global Physical Health Score 12    PROMIS TOTAL - SUBSCORES 27          Referral / Collaboration:  Referral to another professional/service is not indicated at this time..    Anticipated number of session for this episode of care: 4-6  Anticipation frequency of session: Every other week  Anticipated Duration of each  "session: 38-52 minutes  Treatment plan will be reviewed in 90 days or when goals have been changed.       MeasurableTreatment Goal(s) related to diagnosis / functional impairment(s)  Goal 1: Patient will experience a reduction in depressive symptoms along with a corresponding increase in positive emotion and life satisfaction.      Objective #A (Patient Action)    Patient will Increase interest, engagement, and pleasure in doing things.  Status: Continued - Date(s): 11/3/2023    Intervention(s)  Therapist will help patient identify pleasant and mastery oriented events that elicit positive, relaxed mood.    Objective #B  Patient will Decrease frequency and intensity of feeling down, depressed, hopeless.  Status: Continued - Date(s): 11/3/2023    Intervention(s)  Therapist will introduce patient to cognitive-behavioral and acceptance and commitment therapy topics aimed to help reduce depression and anxiety    Objective #C  Patient will Identify negative self-talk and behaviors: challenge core beliefs, myths, and actions  Improve concentration, focus, and mindfulness in daily activities .  Status: Continued - Date(s): COMPLETE    Intervention(s)  Therapist will help patient identify and manage negative self-talk and automatic thoughts; introduce patient to cognitive distortions; help patient develop cognitive diffusion techniques      Goal 2: Patient will experience a reduction in anxious symptoms, along with a corresponding increase in relaxed emotional states and life satisfaction.      Objective #A (Patient Action)  Patient will use cognitive-behavioral and thought diffusion strategies identified in therapy to challenge anxious thoughts.    Status: Continued - Date(s): COMPLETE    Intervention(s)  Therapist will utilize CBT and ACT ideas to help patient challenge anxious thoughts and reduce intensity/duration of anxious distress    Objective #B  Patient will use \"worry time\" each day for 15 minutes of scheduled " worry and then defer obsessive or anxious thinking until the next structured worry time.    Status: Continued - Date(s): COMPLETE    Intervention(s)  Therapist will teach patient how to effectively utilize worry time and/or thought logs/journals each day and incorporate more relaxation behaviors into their routine.    Objective #C  Patient will identify the stressors which contribute to feelings of anxiety  Patient will increase engagement in adaptive coping skills and recreational activities, such as exercise and healthy socialization, to manage distress.  Status: Continued - Date(s): COMPLETE    Intervention(s)  Therapist will help patient identify triggers/situational factors that contribute to anxiety and behavioral skills aimed to manage anxious distress.      Goal 3: Patiient will work toward adaptively managing bipolar-related depression and manic episodes      Objective #A (Patient Action)    Status: Continued - Date(s): COMPLETE    Patient will identify 2-3 signs or signals of emerging mood instability.    Intervention(s)  Therapist will provide educational materials on bipolar disorder and help identifying triggers for mood instability .    Objective #B  Patient will identify 2-3 strategies for more effectively managing Bipolar Disorder.    Status: Continued - Date(s): COMPLETE    Intervention(s)  Therapist will teach emotional recognition/identification. Skills aimed to effectively manage bipolar disorder .      Other Possible Therapeutic Intervention(s):    Psycho-education regarding mental health diagnoses and treatment options    Supportive Therapy  Provide affirmations, reflections, and establish working rapport  Emphasize and reflect on strength of therapeutic relationship    Skills training  Explore skills useful to client in current situation.  Skills include assertiveness, communication, conflict management, problem-solving, relaxation, etc.    Solution-Focused Therapy  Explore patterns in  patient's relationships and discuss options for new behaviors.  Explore patterns in patient's actions and choices and discuss options for new behaviors.    Cognitive-behavioral Therapy  Discuss common cognitive distortions, identify them in patient's life.  Explore ways to challenge, replace, and act against these cognitions.    Acceptance and Commitment Therapy  Explore and identify important values in patient's life.  Discuss ways to commit to behavioral activation around these values.    Psychodynamic psychotherapy  Discuss patient's emotional dynamics and issues and how they impact behaviors.  Explore patient's history of relationships and how they impact present behaviors.  Explore how to work with and make changes in these schemas and patterns.    Narrative Therapy  Explore the patient's story of his/her life from his/her perspective.  Explore alternate ways of understanding their experience, identifying exceptions, developing new themes.    Interpersonal Psychotherapy  Explore patterns in relationships that are effective or ineffective at helping patient reach their goals, find satisfying experience.  Discuss new patterns or behaviors to engage in for improved social functioning.    Behavioral Activation  Discuss steps patient can take to become more involved in meaningful activity.  Identify barriers to these activities and explore possible solutions.    Mindfulness-Based Strategies  Discuss skills based on development and application of mindfulness.  Skills drawn from compassion-focused therapy, dialectical behavior therapy, mindfulness-based stress reduction, mindfulness-based cognitive therapy, etc.      Patient has reviewed and agreed to the above plan.      Joseph Murillo Psy.D, LP   Behavioral Health Clinician   [facility]     11/3/2023    Answers for HPI/ROS submitted by the patient on 2/28/2023  If you checked off any problems, how difficult have these problems made  it for you to do your work, take care of things at home, or get along with other people?: Very difficult  PHQ9 TOTAL SCORE: 6    Answers for HPI/ROS submitted by the patient on 6/28/2023  If you checked off any problems, how difficult have these problems made it for you to do your work, take care of things at home, or get along with other people?: Somewhat difficult  PHQ9 TOTAL SCORE: 3Answers submitted by the patient for this visit:  Patient Health Questionnaire (Submitted on 9/20/2023)  If you checked off any problems, how difficult have these problems made it for you to do your work, take care of things at home, or get along with other people?: Somewhat difficult  PHQ9 TOTAL SCORE: 5  Answers submitted by the patient for this visit:  Patient Health Questionnaire (Submitted on 11/2/2023)  If you checked off any problems, how difficult have these problems made it for you to do your work, take care of things at home, or get along with other people?: Somewhat difficult  PHQ9 TOTAL SCORE: 2

## 2024-01-05 DIAGNOSIS — Z94.4 LIVER REPLACED BY TRANSPLANT (H): Primary | ICD-10-CM

## 2024-01-05 DIAGNOSIS — Z13.220 LIPID SCREENING: ICD-10-CM

## 2024-01-08 ENCOUNTER — OFFICE VISIT (OUTPATIENT)
Dept: ORTHOPEDICS | Facility: CLINIC | Age: 59
End: 2024-01-08
Payer: MEDICARE

## 2024-01-08 DIAGNOSIS — S90.821S BLISTER OF RIGHT FOOT, SEQUELA: ICD-10-CM

## 2024-01-08 DIAGNOSIS — E11.51 DIABETES MELLITUS WITH PERIPHERAL VASCULAR DISEASE (H): ICD-10-CM

## 2024-01-08 DIAGNOSIS — E11.49 TYPE II OR UNSPECIFIED TYPE DIABETES MELLITUS WITH NEUROLOGICAL MANIFESTATIONS, NOT STATED AS UNCONTROLLED(250.60) (H): ICD-10-CM

## 2024-01-08 DIAGNOSIS — L60.2 ONYCHAUXIS: ICD-10-CM

## 2024-01-08 PROCEDURE — 11719 TRIM NAIL(S) ANY NUMBER: CPT | Performed by: PODIATRIST

## 2024-01-08 PROCEDURE — 99214 OFFICE O/P EST MOD 30 MIN: CPT | Mod: 25 | Performed by: PODIATRIST

## 2024-01-08 NOTE — LETTER
1/8/2024         RE: Frandy Workman  [address] [facility] [address]        Dear Colleague,    Thank you for referring your patient, Frandy Workman, to the [facility] [place]. Please see a copy of my visit note below.    Past Medical History:   Diagnosis Date    Anemia 2013    Arthritis     BPH (benign prostatic hyperplasia)     CAD (coronary artery disease) 04/01/2019    Cholelithiasis     Conductive hearing loss 08/16/2017    Depressive disorder 1986    Suffer effects throughout life    Gastroesophageal reflux disease 12/01/2014    HCC (hepatocellular carcinoma) (H) 01/22/2019    History of blood transfusion 2019    Had blood transfussion until Dec 2022    History of diabetic retinopathy 07/2018    HTN (hypertension) 11/20/2019    Hyperlipidemia     Liver cirrhosis secondary to ESTRADA (H)     Liver transplanted (H) 11/11/2019    Portal vein thrombosis 04/11/2019    Type II diabetes mellitus (H)      Patient Active Problem List   Diagnosis    Bipolar affective disorder in remission (H24)    Esophageal varices determined by endoscopy (H)    Erectile dysfunction due to diseases classified elsewhere    HCC (hepatocellular carcinoma) (H)    Equivocal stress echocardiogram    Type 2 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, unspecified whether long term insulin use (H)    Status post coronary angiogram    CAD (coronary artery disease)    Liver transplant recipient (H)    Status post liver transplantation (H)    Immunosuppressed status (H24)    Benign essential hypertension    Anemia of chronic renal failure, stage 3a (H)    History of coronary artery disease    Dyslipidemia    Excessive sweating    Stage 3b chronic kidney disease (H)    Anemia of chronic renal failure, stage 3b (H)    NSTEMI (non-ST elevated myocardial infarction) (H)    Chest pain, unspecified type    Hypertensive chronic kidney disease with stage 5 chronic  kidney disease or end stage renal disease (H)    Vitreous hemorrhage of left eye (H)    Proliferative diabetic retinopathy of both eyes associated with type 2 diabetes mellitus, unspecified proliferative retinopathy type (H)    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)    Liver replaced by transplant (H)    Hyperkalemia    Acute renal failure, unspecified acute renal failure type (H24)    ARIELA (obstructive sleep apnea)    History of nonmelanoma skin cancer    Neoplasm of unspecified behavior of bone, soft tissue, and skin     Past Surgical History:   Procedure Laterality Date    ABDOMEN SURGERY  11/11/2019    Had Liver Transplant    BIOPSY  12/2022    Had biopsy done will have additional procedure 2/15/2023    BYPASS GRAFT ARTERY CORONARY N/A 07/14/2021    Procedure: median sternotomy, on cardiopulmonary bypass, CORONARY ARTERY BYPASS GRAFT (CABG) x2 with left greater saphenous vein endoscopic harvest and left internal mammery artery harvest;  Surgeon: Tom Zapata MD;  Location: [facility] OR    CARDIAC SURGERY  7/2021    Heart Graph. and bypass surgery    CHOLECYSTECTOMY  11/11/2022    Removed with Liver Transplant    COLONOSCOPY      2015    COLONOSCOPY N/A 12/06/2019    Procedure: COLONOSCOPY, WITH POLYPECTOMY AND BIOPSY;  Surgeon: Adam Morton MD;  Location:  GI    CV CENTRAL VENOUS CATHETER PLACEMENT N/A 07/12/2021    Procedure: Central Venous Catheter Placement;  Surgeon: Fermin Polanco MD;  Location: [facility] CARDIAC CATH LAB    CV CORONARY ANGIOGRAM N/A 07/12/2021    Procedure: Coronary Angiogram;  Surgeon: Fermin Polanco MD;  Location: [facility] CARDIAC CATH LAB    CV HEART CATHETERIZATION WITH POSSIBLE INTERVENTION N/A 02/26/2019    Procedure: CORS;  Surgeon: Jagdish Hoyt MD;  Location: [facility] CARDIAC CATH LAB    CV INTRA AORTIC BALLOON N/A 07/12/2021    Procedure: Intra Aortic Balloon Pump Insertion;  Surgeon: Fermin Polanco MD;   Location:  HEART CARDIAC CATH LAB    ESOPHAGOSCOPY, GASTROSCOPY, DUODENOSCOPY (EGD), COMBINED N/A 11/17/2016    Procedure: COMBINED ESOPHAGOSCOPY, GASTROSCOPY, DUODENOSCOPY (EGD);  Surgeon: Santi Rosas MD;  Location:  GI    ESOPHAGOSCOPY, GASTROSCOPY, DUODENOSCOPY (EGD), COMBINED N/A 11/17/2017    Procedure: COMBINED ESOPHAGOSCOPY, GASTROSCOPY, DUODENOSCOPY (EGD);  EGD;  Surgeon: Santi Rosas MD;  Location:  GI    ESOPHAGOSCOPY, GASTROSCOPY, DUODENOSCOPY (EGD), COMBINED N/A 12/28/2018    Procedure: EGD;  Surgeon: Santi Rosas MD;  Location:  OR    ESOPHAGOSCOPY, GASTROSCOPY, DUODENOSCOPY (EGD), COMBINED N/A 12/06/2019    Procedure: ESOPHAGOGASTRODUODENOSCOPY, WITH BIOPSY;  Surgeon: Adam Morton MD;  Location:  GI    ESOPHAGOSCOPY, GASTROSCOPY, DUODENOSCOPY (EGD), COMBINED N/A 02/13/2020    Procedure: ESOPHAGOGASTRODUODENOSCOPY (EGD);  Surgeon: Santi Rosas MD;  Location:  GI    EXCISE MASS ABDOMEN N/A 07/13/2023    Procedure: Laparoscopic lysis of adhesions, laparoscopic resection of abdominal mass;  Surgeon: Ruiz Chu MD;  Location:  OR    HEAD & NECK SURGERY      12/2017 at [facility].     IMPLANT GOLD WEIGHT EYELID Right 11/16/2017    Procedure: IMPLANT WEIGHT EYELID;  Right Upper Eyelid Weight, right tarsal strip lower eyelid;  Surgeon: Milana Malave MD;  Location: [facility] OR    IR CHEMO EMBOLIZATION  01/22/2019    KNEE SURGERY Left     ORTHOPEDIC SURGERY      PAROTIDECTOMY, RADICAL NECK DISSECTION Right 11/02/2017    Procedure: PAROTIDECTOMY, RADICAL NECK DISSECTION;  Right Superfacial Parotidectomy , Facial nerve repair. with [facility] facial nerve monitor.;  Surgeon: Asiya Morgan MD;  Location:  OR    PHACOEMULSIFICATION CLEAR CORNEA WITH STANDARD INTRAOCULAR LENS IMPLANT Right 01/24/2023    Procedure: PHACOEMULSIFICATION, COMPLEX CATARACT, WITH INTRAOCULAR LENS IMPLANT WITH TRYPAN RIGHT EYE;  Surgeon: Enriqueta Martin MD;   Location: [facility] OR    PHACOEMULSIFICATION WITH STANDARD INTRAOCULAR LENS IMPLANT Left 2023    Procedure: PHACOEMULSIFICATION, COMPLEX CATARACT, WITH STANDARD INTRAOCULAR LENS IMPLANT INSERTION LEFT EYE;  Surgeon: Enriqueta Martin MD;  Location: [facility] OR    PICC INSERTION Left 2017    4fr SL BioFlo PICC, 44cm in the L basilic vein w/ tip in the low SVC    RETURN LIVER TRANSPLANT N/A 2019    Procedure: Exploratory laparotomy, hematoma evacuation, abdominal washout;  Surgeon: Александр Ramos MD;  Location: [facility] OR    TRANSPLANT LIVER RECIPIENT  DONOR N/A 2019    Procedure: TRANSPLANT, LIVER, RECIPIENT,  DONOR;  Surgeon: Александр Ramos MD;  Location: [facility] OR    VASCULAR SURGERY       Social History     Socioeconomic History    Marital status:      Spouse name: Not on file    Number of children: Not on file    Years of education: Not on file    Highest education level: Bachelor's degree (e.g., BA, AB, BS)   Occupational History    Not on file   Tobacco Use    Smoking status: Former     Types: Dip, chew, snus or snuff     Passive exposure: Past    Smokeless tobacco: Former     Types: Chew     Quit date: 10/31/2017    Tobacco comments:     Quit [place] after Doctor told me in 2017 that if I didn't I would   Vaping Use    Vaping Use: Never used   Substance and Sexual Activity    Alcohol use: No     Comment: quit 1996    Drug use: No    Sexual activity: Not Currently     Partners: Female     Birth control/protection: Condom   Other Topics Concern    Parent/sibling w/ CABG, MI or angioplasty before 65F 55M? No   Social History Narrative    Prior , [facility]     Social Determinants of Health     Financial Resource Strain: Low Risk  (10/24/2023)    Financial Resource Strain     Within the past 12 months, have you or your family members you live with been unable to get utilities (heat, electricity) when it was really needed?: No   Food Insecurity: Low Risk   (10/24/2023)    Food Insecurity     Within the past 12 months, did you worry that your food would run out before you got money to buy more?: No     Within the past 12 months, did the food you bought just not last and you didn t have money to get more?: No   Transportation Needs: Low Risk  (10/24/2023)    Transportation Needs     Within the past 12 months, has lack of transportation kept you from medical appointments, getting your medicines, non-medical meetings or appointments, work, or from getting things that you need?: No   Physical Activity: Insufficiently Active (10/13/2020)    Exercise Vital Sign     Days of Exercise per Week: 1 day     Minutes of Exercise per Session: 60 min   Stress: Stress Concern Present (10/13/2020)    [place] [employer] - Occupational Stress Questionnaire     Feeling of Stress : To some extent   Social Connections: Socially Isolated (10/13/2020)    Social Connection and Isolation Panel [NHANES]     Frequency of Communication with Friends and Family: Once a week     Frequency of Social Gatherings with Friends and Family: Once a week     Attends Buddhism Services: Never     Active Member of Clubs or Organizations: No     Attends Club or Organization Meetings: Never     Marital Status:    Interpersonal Safety: Low Risk  (10/31/2023)    Interpersonal Safety     Do you feel physically and emotionally safe where you currently live?: Yes     Within the past 12 months, have you been hit, slapped, kicked or otherwise physically hurt by someone?: No     Within the past 12 months, have you been humiliated or emotionally abused in other ways by your partner or ex-partner?: No   Housing Stability: Low Risk  (10/24/2023)    Housing Stability     Do you have housing? : Yes     Are you worried about losing your housing?: No     Family History   Problem Relation Age of Onset    Skin Cancer Mother     Cancer Mother         mastectomy    Diabetes Mother          3/2016     Cerebrovascular Disease Mother         Passed away in Feb of this year, 80 years old.    Thyroid Disease Mother     Depression Mother     Breast Cancer Mother     Obesity Mother         280 pounds  from this and complications from D    Pancreatic Cancer Father 60    Prostate Cancer Father         Currently in Remission    Macular Degeneration Father     Glaucoma Father     Skin Cancer Father     Coronary Artery Disease Father         Started in his 70's  at 88 in Octobe2023    Colon Cancer Father     Thyroid Disease Sister     Depression Sister     Asthma Sister     Sleep Apnea Brother     Colorectal Cancer Maternal Grandmother     Cancer Maternal Grandmother     Substance Abuse Maternal Grandmother         Alcohol    Prostate Cancer Maternal Grandfather     Substance Abuse Maternal Grandfather         Alcohol    Colorectal Cancer Paternal Grandmother     Asthma Sister         Had since birth    Liver Disease No family hx of     Melanoma No family hx of     Anesthesia Reaction No family hx of     Deep Vein Thrombosis (DVT) No family hx of        Lab Results   Component Value Date    A1C 5.7 2023    A1C 6.3 2023    A1C 6.9 2022    A1C 6.0 01/10/2022    A1C 6.8 2021    A1C 6.0 2020    A1C 6.3 2020    A1C 6.6 2018    A1C 6.5 2017    A1C 7.8 10/25/2016     Last Renal Panel:  Sodium   Date Value Ref Range Status   2023 138 135 - 145 mmol/L Final     Comment:     Reference intervals for this test were updated on 2023 to more accurately reflect our healthy population. There may be differences in the flagging of prior results with similar values performed with this method. Interpretation of those prior results can be made in the context of the updated reference intervals.    2021 137 133 - 144 mmol/L Final     Potassium   Date Value Ref Range Status   2023 5.0 3.4 - 5.3 mmol/L Final   2022 4.7 3.4 - 5.3 mmol/L Final   2021 4.6  3.4 - 5.3 mmol/L Final     Potassium POCT   Date Value Ref Range Status   08/10/2023 7.7 (HH) 3.4 - 5.3 mmol/L Final     Comment:     Chart Critical result. Doctor notified     Chloride   Date Value Ref Range Status   12/26/2023 103 98 - 107 mmol/L Final   07/20/2022 105 94 - 109 mmol/L Final   06/28/2021 107 94 - 109 mmol/L Final     Carbon Dioxide   Date Value Ref Range Status   06/28/2021 25 20 - 32 mmol/L Final     Carbon Dioxide (CO2)   Date Value Ref Range Status   12/26/2023 25 22 - 29 mmol/L Final   07/20/2022 26 20 - 32 mmol/L Final     Anion Gap   Date Value Ref Range Status   12/26/2023 10 7 - 15 mmol/L Final   07/20/2022 7 3 - 14 mmol/L Final   06/28/2021 5 3 - 14 mmol/L Final     Glucose   Date Value Ref Range Status   12/26/2023 144 (H) 70 - 99 mg/dL Final   07/20/2022 269 (H) 70 - 99 mg/dL Final   06/28/2021 208 (H) 70 - 99 mg/dL Final     GLUCOSE BY METER POCT   Date Value Ref Range Status   08/16/2023 185 (H) 70 - 99 mg/dL Final     Urea Nitrogen   Date Value Ref Range Status   12/26/2023 30.6 (H) 8.0 - 23.0 mg/dL Final   07/20/2022 32 (H) 7 - 30 mg/dL Final   06/28/2021 33 (H) 7 - 30 mg/dL Final     Creatinine   Date Value Ref Range Status   12/26/2023 1.36 (H) 0.67 - 1.17 mg/dL Final   06/28/2021 1.62 (H) 0.66 - 1.25 mg/dL Final     GFR Estimate   Date Value Ref Range Status   12/26/2023 60 (L) >60 mL/min/1.73m2 Final   06/28/2021 46 (L) >60 mL/min/[1.73_m2] Final     Comment:     Non  GFR Calc  Starting 12/18/2018, serum creatinine based estimated GFR (eGFR) will be   calculated using the Chronic Kidney Disease Epidemiology Collaboration   (CKD-EPI) equation.       GFR, ESTIMATED POCT   Date Value Ref Range Status   07/28/2023 28 (L) >60 mL/min/1.73m2 Final     Calcium   Date Value Ref Range Status   12/26/2023 9.4 8.6 - 10.0 mg/dL Final   06/28/2021 9.1 8.5 - 10.1 mg/dL Final     Phosphorus   Date Value Ref Range Status   12/26/2023 2.6 2.5 - 4.5 mg/dL Final   02/26/2021 4.0 2.5  - 4.5 mg/dL Final     Albumin   Date Value Ref Range Status   12/26/2023 4.5 3.5 - 5.2 g/dL Final   07/20/2022 4.3 3.4 - 5.0 g/dL Final   06/28/2021 4.0 3.4 - 5.0 g/dL Final         SUBJECTIVE FINDINGS:  A 58-year-old male returns to clinic for Diabetic foot cares.  He relates he is doing okay.  Relates he has numbness, tingling and neuropathy in his feet.  Relates to no ulcers or sores since we have seen him last.  Relates he needs his toenails trimmed.  He relates he does have Diabetic shoes and insoles and those are good.  Relates has been exercising and doing about 12,000 steps a day.  Relates no injuries.  Relates he is getting pain in his right fourth MPJ and along his Achilles tendons mostly at night.    OBJECTIVE FINDINGS:  DP and PT 2/4 bilaterally.  He has dorsally contracted digits 2-5 bilaterally.  He has incurvated nails with minimal thickening and dystrophy 1-5 bilaterally to differing degrees.  There is no erythema, no drainage, no odor, no calor bilaterally.  Sharp/dull is intact with 5.07 [place] monofilament bilaterally.  Proprioception is intact bilaterally.  Deep tendon reflexes are intact bilaterally.  There are no gross tendon voids bilaterally.  He has a laterally deviated hallux bilaterally.  He has pain on palpation of plantar right fourth MPJ area that appears to be a healing blister.  He has pain on palpation of Achilles tendon at the myotendinous junction bilaterally.     ASSESSMENT/PLAN:  Onychauxis with onychomycosis changes bilaterally.  Diabetes with peripheral Neuropathy and foot deformity.  Achilles tendinopathy bilaterally.  Blister right plantar fourth MPJ.  His other blisters are healed.  His protective sensation is intact.  Diagnosis and treatment options discussed with him.  All the nails were reduced bilaterally upon consent.  Applied Betadine to the right fourth blister this is dispensed and use discussed with him.  Advised him on a runner stretch for the Achilles  tendinopathy.  Continue Diabetic shoes.  He will return to clinic and see me in 1 month.  Previous notes reviewed.                        Moderate level of medical decision making.           Again, thank you for allowing me to participate in the care of your patient.        Sincerely,        Lamin Gonzalez DPM

## 2024-01-08 NOTE — PROGRESS NOTES
Past Medical History:   Diagnosis Date    Anemia 2013    Arthritis     BPH (benign prostatic hyperplasia)     CAD (coronary artery disease) 04/01/2019    Cholelithiasis     Conductive hearing loss 08/16/2017    Depressive disorder 1986    Suffer effects throughout life    Gastroesophageal reflux disease 12/01/2014    HCC (hepatocellular carcinoma) (H) 01/22/2019    History of blood transfusion 2019    Had blood transfussion until Dec 2022    History of diabetic retinopathy 07/2018    HTN (hypertension) 11/20/2019    Hyperlipidemia     Liver cirrhosis secondary to ESTRADA (H)     Liver transplanted (H) 11/11/2019    Portal vein thrombosis 04/11/2019    Type II diabetes mellitus (H)      Patient Active Problem List   Diagnosis    Bipolar affective disorder in remission (H24)    Esophageal varices determined by endoscopy (H)    Erectile dysfunction due to diseases classified elsewhere    HCC (hepatocellular carcinoma) (H)    Equivocal stress echocardiogram    Type 2 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, unspecified whether long term insulin use (H)    Status post coronary angiogram    CAD (coronary artery disease)    Liver transplant recipient (H)    Status post liver transplantation (H)    Immunosuppressed status (H24)    Benign essential hypertension    Anemia of chronic renal failure, stage 3a (H)    History of coronary artery disease    Dyslipidemia    Excessive sweating    Stage 3b chronic kidney disease (H)    Anemia of chronic renal failure, stage 3b (H)    NSTEMI (non-ST elevated myocardial infarction) (H)    Chest pain, unspecified type    Hypertensive chronic kidney disease with stage 5 chronic kidney disease or end stage renal disease (H)    Vitreous hemorrhage of left eye (H)    Proliferative diabetic retinopathy of both eyes associated with type 2 diabetes mellitus, unspecified proliferative retinopathy type (H)    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)    Liver replaced by transplant (H)    Hyperkalemia    Acute renal failure, unspecified acute renal failure type (H24)    ARIELA (obstructive sleep apnea)    History of nonmelanoma skin cancer    Neoplasm of unspecified behavior of bone, soft tissue, and skin     Past Surgical History:   Procedure Laterality Date    ABDOMEN SURGERY  11/11/2019    Had Liver Transplant    BIOPSY  12/2022    Had biopsy done will have additional procedure 2/15/2023    BYPASS GRAFT ARTERY CORONARY N/A 07/14/2021    Procedure: median sternotomy, on cardiopulmonary bypass, CORONARY ARTERY BYPASS GRAFT (CABG) x2 with left greater saphenous vein endoscopic harvest and left internal mammery artery harvest;  Surgeon: Tom Zapata MD;  Location:  OR    CARDIAC SURGERY  7/2021    Heart Graph. and bypass surgery    CHOLECYSTECTOMY  11/11/2022    Removed with Liver Transplant    COLONOSCOPY      2015    COLONOSCOPY N/A 12/06/2019    Procedure: COLONOSCOPY, WITH POLYPECTOMY AND BIOPSY;  Surgeon: Adam [facility] MD;  Location: [facility]    CV CENTRAL VENOUS CATHETER PLACEMENT N/A 07/12/2021    Procedure: Central Venous Catheter Placement;  Surgeon: Fermin Polanco MD;  Location: [facility] CARDIAC CATH LAB    CV CORONARY ANGIOGRAM N/A 07/12/2021    Procedure: Coronary Angiogram;  Surgeon: Fermin Polanco MD;  Location: [facility] CARDIAC CATH LAB    CV HEART CATHETERIZATION WITH POSSIBLE INTERVENTION N/A 02/26/2019    Procedure: CORS;  Surgeon: Jagdish Hoyt MD;  Location: [facility] CARDIAC CATH LAB    CV INTRA AORTIC BALLOON N/A 07/12/2021    Procedure: Intra Aortic Balloon Pump Insertion;  Surgeon: Fermin Polanco MD;  Location: [facility] CARDIAC CATH LAB    ESOPHAGOSCOPY, GASTROSCOPY, DUODENOSCOPY (EGD), COMBINED N/A 11/17/2016    Procedure: COMBINED ESOPHAGOSCOPY, GASTROSCOPY, DUODENOSCOPY (EGD);  Surgeon: Santi Rosas MD;  Location:  GI    ESOPHAGOSCOPY,  GASTROSCOPY, DUODENOSCOPY (EGD), COMBINED N/A 11/17/2017    Procedure: COMBINED ESOPHAGOSCOPY, GASTROSCOPY, DUODENOSCOPY (EGD);  EGD;  Surgeon: Santi Rosas MD;  Location:  GI    ESOPHAGOSCOPY, GASTROSCOPY, DUODENOSCOPY (EGD), COMBINED N/A 12/28/2018    Procedure: EGD;  Surgeon: Santi Rosas MD;  Location:  OR    ESOPHAGOSCOPY, GASTROSCOPY, DUODENOSCOPY (EGD), COMBINED N/A 12/06/2019    Procedure: ESOPHAGOGASTRODUODENOSCOPY, WITH BIOPSY;  Surgeon: Adam [facility] MD;  Location:  GI    ESOPHAGOSCOPY, GASTROSCOPY, DUODENOSCOPY (EGD), COMBINED N/A 02/13/2020    Procedure: ESOPHAGOGASTRODUODENOSCOPY (EGD);  Surgeon: Santi Rosas MD;  Location:  GI    EXCISE MASS ABDOMEN N/A 07/13/2023    Procedure: Laparoscopic lysis of adhesions, laparoscopic resection of abdominal mass;  Surgeon: Ruiz Chu MD;  Location:  OR    HEAD & NECK SURGERY      12/2017 at [facility].     IMPLANT GOLD WEIGHT EYELID Right 11/16/2017    Procedure: IMPLANT WEIGHT EYELID;  Right Upper Eyelid Weight, right tarsal strip lower eyelid;  Surgeon: Milana Malave MD;  Location:  OR    IR CHEMO EMBOLIZATION  01/22/2019    KNEE SURGERY Left     ORTHOPEDIC SURGERY      PAROTIDECTOMY, RADICAL NECK DISSECTION Right 11/02/2017    Procedure: PAROTIDECTOMY, RADICAL NECK DISSECTION;  Right Superfacial Parotidectomy , Facial nerve repair. with [facility] facial nerve monitor.;  Surgeon: Asiya Morgan MD;  Location:  OR    PHACOEMULSIFICATION CLEAR CORNEA WITH STANDARD INTRAOCULAR LENS IMPLANT Right 01/24/2023    Procedure: PHACOEMULSIFICATION, COMPLEX CATARACT, WITH INTRAOCULAR LENS IMPLANT WITH TRYPAN RIGHT EYE;  Surgeon: Enriqueta Martin MD;  Location: [facility] OR    PHACOEMULSIFICATION WITH STANDARD INTRAOCULAR LENS IMPLANT Left 01/03/2023    Procedure: PHACOEMULSIFICATION, COMPLEX CATARACT, WITH STANDARD INTRAOCULAR LENS IMPLANT INSERTION LEFT EYE;  Surgeon: Enriqueta Martin MD;  Location: [facility]  OR    PICC INSERTION Left 2017    4fr SL BioFlo PICC, 44cm in the L basilic vein w/ tip in the low SVC    RETURN LIVER TRANSPLANT N/A 2019    Procedure: Exploratory laparotomy, hematoma evacuation, abdominal washout;  Surgeon: Александр Ramos MD;  Location: [facility] OR    TRANSPLANT LIVER RECIPIENT  DONOR N/A 2019    Procedure: TRANSPLANT, LIVER, RECIPIENT,  DONOR;  Surgeon: Александр Ramos MD;  Location: [facility] OR    VASCULAR SURGERY       Social History     Socioeconomic History    Marital status:      Spouse name: Not on file    Number of children: Not on file    Years of education: Not on file    Highest education level: Bachelor's degree (e.g., BA, AB, BS)   Occupational History    Not on file   Tobacco Use    Smoking status: Former     Types: Dip, chew, snus or snuff     Passive exposure: Past    Smokeless tobacco: Former     Types: Chew     Quit date: 10/31/2017    Tobacco comments:     Quit [place] after Doctor told me in  that if I didn't I would   Vaping Use    Vaping Use: Never used   Substance and Sexual Activity    Alcohol use: No     Comment: quit 1996    Drug use: No    Sexual activity: Not Currently     Partners: Female     Birth control/protection: Condom   Other Topics Concern    Parent/sibling w/ CABG, MI or angioplasty before 65F 55M? No   Social History Narrative    Prior [facility]     Social Determinants of Health     Financial Resource Strain: Low Risk  (10/24/2023)    Financial Resource Strain     Within the past 12 months, have you or your family members you live with been unable to get utilities (heat, electricity) when it was really needed?: No   Food Insecurity: Low Risk  (10/24/2023)    Food Insecurity     Within the past 12 months, did you worry that your food would run out before you got money to buy more?: No     Within the past 12 months, did the food you bought just not last and you didn t have money to get more?: No    Transportation Needs: Low Risk  (10/24/2023)    Transportation Needs     Within the past 12 months, has lack of transportation kept you from medical appointments, getting your medicines, non-medical meetings or appointments, work, or from getting things that you need?: No   Physical Activity: Insufficiently Active (10/13/2020)    Exercise Vital Sign     Days of Exercise per Week: 1 day     Minutes of Exercise per Session: 60 min   Stress: Stress Concern Present (10/13/2020)    [employer] - Occupational Stress Questionnaire     Feeling of Stress : To some extent   Social Connections: Socially Isolated (10/13/2020)    Social Connection and Isolation Panel [NHANES]     Frequency of Communication with Friends and Family: Once a week     Frequency of Social Gatherings with Friends and Family: Once a week     Attends Restorationism Services: Never     Active Member of Clubs or Organizations: No     Attends Club or Organization Meetings: Never     Marital Status:    Interpersonal Safety: Low Risk  (10/31/2023)    Interpersonal Safety     Do you feel physically and emotionally safe where you currently live?: Yes     Within the past 12 months, have you been hit, slapped, kicked or otherwise physically hurt by someone?: No     Within the past 12 months, have you been humiliated or emotionally abused in other ways by your partner or ex-partner?: No   Housing Stability: Low Risk  (10/24/2023)    Housing Stability     Do you have housing? : Yes     Are you worried about losing your housing?: No     Family History   Problem Relation Age of Onset    Skin Cancer Mother     Cancer Mother         mastectomy    Diabetes Mother          3/2016    Cerebrovascular Disease Mother         Passed away in Feb of this year, 80 years old.    Thyroid Disease Mother     Depression Mother     Breast Cancer Mother     Obesity Mother         280 pounds  from this and complications from D    Pancreatic  Cancer Father 60    Prostate Cancer Father         Currently in Remission    Macular Degeneration Father     Glaucoma Father     Skin Cancer Father     Coronary Artery Disease Father         Started in his 70's  at 88 in Octobe2023    Colon Cancer Father     Thyroid Disease Sister     Depression Sister     Asthma Sister     Sleep Apnea Brother     Colorectal Cancer Maternal Grandmother     Cancer Maternal Grandmother     Substance Abuse Maternal Grandmother         Alcohol    Prostate Cancer Maternal Grandfather     Substance Abuse Maternal Grandfather         Alcohol    Colorectal Cancer Paternal Grandmother     Asthma Sister         Had since birth    Liver Disease No family hx of     Melanoma No family hx of     Anesthesia Reaction No family hx of     Deep Vein Thrombosis (DVT) No family hx of        Lab Results   Component Value Date    A1C 5.7 2023    A1C 6.3 2023    A1C 6.9 2022    A1C 6.0 01/10/2022    A1C 6.8 2021    A1C 6.0 2020    A1C 6.3 2020    A1C 6.6 2018    A1C 6.5 2017    A1C 7.8 10/25/2016     Last Renal Panel:  Sodium   Date Value Ref Range Status   2023 138 135 - 145 mmol/L Final     Comment:     Reference intervals for this test were updated on 2023 to more accurately reflect our healthy population. There may be differences in the flagging of prior results with similar values performed with this method. Interpretation of those prior results can be made in the context of the updated reference intervals.    2021 137 133 - 144 mmol/L Final     Potassium   Date Value Ref Range Status   2023 5.0 3.4 - 5.3 mmol/L Final   2022 4.7 3.4 - 5.3 mmol/L Final   2021 4.6 3.4 - 5.3 mmol/L Final     Potassium POCT   Date Value Ref Range Status   08/10/2023 7.7 (HH) 3.4 - 5.3 mmol/L Final     Comment:     Chart Critical result. Doctor notified     Chloride   Date Value Ref Range Status   2023 103 98 - 107 mmol/L Final    07/20/2022 105 94 - 109 mmol/L Final   06/28/2021 107 94 - 109 mmol/L Final     Carbon Dioxide   Date Value Ref Range Status   06/28/2021 25 20 - 32 mmol/L Final     Carbon Dioxide (CO2)   Date Value Ref Range Status   12/26/2023 25 22 - 29 mmol/L Final   07/20/2022 26 20 - 32 mmol/L Final     Anion Gap   Date Value Ref Range Status   12/26/2023 10 7 - 15 mmol/L Final   07/20/2022 7 3 - 14 mmol/L Final   06/28/2021 5 3 - 14 mmol/L Final     Glucose   Date Value Ref Range Status   12/26/2023 144 (H) 70 - 99 mg/dL Final   07/20/2022 269 (H) 70 - 99 mg/dL Final   06/28/2021 208 (H) 70 - 99 mg/dL Final     GLUCOSE BY METER POCT   Date Value Ref Range Status   08/16/2023 185 (H) 70 - 99 mg/dL Final     Urea Nitrogen   Date Value Ref Range Status   12/26/2023 30.6 (H) 8.0 - 23.0 mg/dL Final   07/20/2022 32 (H) 7 - 30 mg/dL Final   06/28/2021 33 (H) 7 - 30 mg/dL Final     Creatinine   Date Value Ref Range Status   12/26/2023 1.36 (H) 0.67 - 1.17 mg/dL Final   06/28/2021 1.62 (H) 0.66 - 1.25 mg/dL Final     GFR Estimate   Date Value Ref Range Status   12/26/2023 60 (L) >60 mL/min/1.73m2 Final   06/28/2021 46 (L) >60 mL/min/[1.73_m2] Final     Comment:     Non  GFR Calc  Starting 12/18/2018, serum creatinine based estimated GFR (eGFR) will be   calculated using the Chronic Kidney Disease Epidemiology Collaboration   (CKD-EPI) equation.       GFR, ESTIMATED POCT   Date Value Ref Range Status   07/28/2023 28 (L) >60 mL/min/1.73m2 Final     Calcium   Date Value Ref Range Status   12/26/2023 9.4 8.6 - 10.0 mg/dL Final   06/28/2021 9.1 8.5 - 10.1 mg/dL Final     Phosphorus   Date Value Ref Range Status   12/26/2023 2.6 2.5 - 4.5 mg/dL Final   02/26/2021 4.0 2.5 - 4.5 mg/dL Final     Albumin   Date Value Ref Range Status   12/26/2023 4.5 3.5 - 5.2 g/dL Final   07/20/2022 4.3 3.4 - 5.0 g/dL Final   06/28/2021 4.0 3.4 - 5.0 g/dL Final         SUBJECTIVE FINDINGS:  A 58-year-old male returns to clinic for Diabetic  foot cares.  He relates he is doing okay.  Relates he has numbness, tingling and neuropathy in his feet.  Relates to no ulcers or sores since we have seen him last.  Relates he needs his toenails trimmed.  He relates he does have Diabetic shoes and insoles and those are good.  Relates has been exercising and doing about 12,000 steps a day.  Relates no injuries.  Relates he is getting pain in his right fourth MPJ and along his Achilles tendons mostly at night.    OBJECTIVE FINDINGS:  DP and PT 2/4 bilaterally.  He has dorsally contracted digits 2-5 bilaterally.  He has incurvated nails with minimal thickening and dystrophy 1-5 bilaterally to differing degrees.  There is no erythema, no drainage, no odor, no calor bilaterally.  Sharp/dull is intact with 5.07 [place] monofilament bilaterally.  Proprioception is intact bilaterally.  Deep tendon reflexes are intact bilaterally.  There are no gross tendon voids bilaterally.  He has a laterally deviated hallux bilaterally.  He has pain on palpation of plantar right fourth MPJ area that appears to be a healing blister.  He has pain on palpation of Achilles tendon at the myotendinous junction bilaterally.     ASSESSMENT/PLAN:  Onychauxis with onychomycosis changes bilaterally.  Diabetes with peripheral Neuropathy and foot deformity.  Achilles tendinopathy bilaterally.  Blister right plantar fourth MPJ.  His other blisters are healed.  His protective sensation is intact.  Diagnosis and treatment options discussed with him.  All the nails were reduced bilaterally upon consent.  Applied Betadine to the right fourth blister this is dispensed and use discussed with him.  Advised him on a runner stretch for the Achilles tendinopathy.  Continue Diabetic shoes.  He will return to clinic and see me in 1 month.  Previous notes reviewed.                        Moderate level of medical decision making.

## 2024-01-10 ENCOUNTER — OFFICE VISIT (OUTPATIENT)
Dept: OPHTHALMOLOGY | Facility: CLINIC | Age: 59
End: 2024-01-10
Attending: OPHTHALMOLOGY
Payer: MEDICARE

## 2024-01-10 DIAGNOSIS — E11.3593 PROLIFERATIVE DIABETIC RETINOPATHY OF BOTH EYES ASSOCIATED WITH TYPE 2 DIABETES MELLITUS, UNSPECIFIED PROLIFERATIVE RETINOPATHY TYPE (H): ICD-10-CM

## 2024-01-10 PROCEDURE — 92134 CPTRZ OPH DX IMG PST SGM RTA: CPT | Performed by: OPHTHALMOLOGY

## 2024-01-10 PROCEDURE — 67028 INJECTION EYE DRUG: CPT | Mod: RT | Performed by: OPHTHALMOLOGY

## 2024-01-10 PROCEDURE — 250N000011 HC RX IP 250 OP 636: Mod: JZ | Performed by: OPHTHALMOLOGY

## 2024-01-10 RX ADMIN — FARICIMAB 6 MG: 6 INJECTION, SOLUTION INTRAVITREAL at 10:18

## 2024-01-10 ASSESSMENT — CONF VISUAL FIELD
OD_INFERIOR_NASAL_RESTRICTION: 0
OD_NORMAL: 1
OS_SUPERIOR_TEMPORAL_RESTRICTION: 0
OS_SUPERIOR_NASAL_RESTRICTION: 0
OS_INFERIOR_NASAL_RESTRICTION: 0
OD_SUPERIOR_NASAL_RESTRICTION: 0
OD_SUPERIOR_TEMPORAL_RESTRICTION: 0
OS_NORMAL: 1
OS_INFERIOR_TEMPORAL_RESTRICTION: 0
OD_INFERIOR_TEMPORAL_RESTRICTION: 0
METHOD: COUNTING FINGERS

## 2024-01-10 ASSESSMENT — VISUAL ACUITY
OS_CC+: -2
OD_CC+: +2
OD_CC: 20/25
METHOD: SNELLEN - LINEAR
OS_CC: 20/25

## 2024-01-10 ASSESSMENT — TONOMETRY
OS_IOP_MMHG: 22
IOP_METHOD: TONOPEN
OD_IOP_MMHG: 21

## 2024-01-10 ASSESSMENT — REFRACTION_WEARINGRX
OS_SPHERE: -0.50
OS_AXIS: 080
OD_ADD: +2.75
OD_CYLINDER: +0.50
OS_CYLINDER: +0.25
OD_SPHERE: -0.50
OD_AXIS: 180
OS_ADD: +2.75

## 2024-01-10 ASSESSMENT — EXTERNAL EXAM - RIGHT EYE: OD_EXAM: NORMAL

## 2024-01-10 ASSESSMENT — EXTERNAL EXAM - LEFT EYE: OS_EXAM: NORMAL

## 2024-01-10 ASSESSMENT — SLIT LAMP EXAM - LIDS
COMMENTS: SMALL PAPILLOMA ALONG LL MARGIN, NON CONCERNING
COMMENTS: NORMAL

## 2024-01-10 NOTE — PROGRESS NOTES
CC:  Follow up Diabetic retinopathy     Interval:  Here for follow-up of Type 2 diabetes mellitus with PDR both eyes and macular edema. Possible inj    Review of systems: started chemo for GI Ca. Was in the ICU because of  kidney failure- emergent dialysis  He has had history of sinus issues worsening this year with allergy season. VA subjectively unchanged, no flashes, no floaters. Last visit he received a left eye injection only.  He is working on walking more which is helping with weight loss.     Lab Results   Component Value Date    A1C 5.7 12/18/2023    A1C 6.3 06/14/2023     HPI: Frandy Workman is a 58 year old patient status post Cataract extraction intraocular lens left eye  history of Diabetes mellitus for 24 ys . Patient on insulin.  Patient on eliquis because of  Ca  Interval History: s/p CEIOL left eye 1/3/22    Retinal Imaging:  OCT 01/10/2024  RE: Resolved central Diabetic macular edema.   LE: Resolved intraretinal fluid. Mild Epiretinal membrane     fluorescein angiography 09/13/23  both eyes normal AV and choroidal filling ou. Staining laser scars. No obvious NVE, mild late macula leakage. peripheral leakage and capillary non perfusion.     Optos consistent with clinical exam    ASSESSMENT:     #T2DM   - HbA1c 5.7% (12/2023)  - Blood pressure (<120/80) and blood glucose (HbA1c <7.0, ~6.5 today) control discussed with patient. Patient advised that failure to adequately control each may lead to vision loss. The patient expressed understanding.    # Proliferative diabetic retinopathy left eye; pre-proliferative diabetic retinopathy right eye    # vitreous hemorrhage left eye 10/12/22   Status post Eylea inj left eye   Status post Panretinal laser photocoagulation (PRP) 9.28.22 left eye and Status post scatter PRP right eye 11/02/22     # Diabetic macular edema both eyes   - status post avastin in both eyes; Switch to Eylea 4/24/19 with improvement of Diabetic macular edema     - Tried holding off on  injection 3/29/23, but edema worsened both eyes  - last Eylea right eye; 8/02/23 interval improvement 09/13/23 (9 weeks prior)  -  discussed with to T&E vs closed observation on 9/13/23, He preferred to observe   -Given mild worsening 10/04/23 changed to Vabysmo right eye (10/04/23 #1)  - VA 20/25, Resolved DME  Consider ozurdex in the future if no response to vabysmo    # status post Cataract extraction intraocular lens both eyes   Right eye: 01/24/23; left eye 01/3/23  -IOP WNL today  Retina detachment and endophthalmitis precautions were discussed with the patient (increased blurry vision, drainage, new flashes, floaters or a curtain in the visual field) and was asked to return if any of the those occur    # Dry eye syndrome   Left eye worse than right eye   warm compresses and artificial tears  As needed  - punctal plugs previously left eye - reports this is better     # Status post liver transplant  - tx 11/2019  - severe anemia; s/p transfusion - anemia much improved   - being seen by his primary team    PLAN:- Vabysmo inj right eye #3 01/10/2024   - Follow up in 8-9 weeks with Optical Coherence Tomography, no dilation; possible Vabysmo    Saurabh Mancia MD  PGY-5 Vitreo-retina surgery Fellow  Department of Ophthalmology   [facility]      ~~~~~~~~~~~~~~~~~~~~~~~~~~~~~~~~~~   Complete documentation of historical and exam elements from today's encounter can be found in the full encounter summary report (not reduplicated in this progress note).  I personally obtained the chief complaint(s) and history of present illness.  I confirmed and edited as necessary the review of systems, past medical/surgical history, family history, social history, and examination findings as documented by others; and I examined the patient myself.  I personally reviewed the relevant tests, images, and reports as documented above.  I personally reviewed the ophthalmic test(s) associated with this encounter, agree with  the interpretation(s) as documented by the resident/fellow, and have edited the corresponding report(s) as necessary.   I formulated and edited as necessary the assessment and plan and discussed the findings and management plan with the patient and family and No resident or fellow assisted with the procedures performed.  I performed the procedures myself.    Enriqueta Martin MD   of Ophthalmology.  Retina Service   Department of Ophthalmology and Visual Neurosciences   [facility]  Phone: [phone]   Fax: [phone]

## 2024-01-10 NOTE — NURSING NOTE
Chief Complaints and History of Present Illnesses   Patient presents with    Diabetic Retinopathy Follow Up     Chief Complaint(s) and History of Present Illness(es)       Diabetic Retinopathy Follow Up              Laterality: both eyes    Associated symptoms: dryness.  Negative for eye pain, tearing, flashes, floaters, itching and burning    Pain scale: 0/10              Comments    Miller is here to continue care for proliferative diabetic retinopathy of both eyes. He states vision seems stable. Right now he says he has some throbbing behind LE.     Brian Flower COT 9:29 AM January 10, 2024

## 2024-01-16 DIAGNOSIS — N18.31 CHRONIC KIDNEY DISEASE, STAGE 3A (H): ICD-10-CM

## 2024-01-17 DIAGNOSIS — C22.0 HCC (HEPATOCELLULAR CARCINOMA) (H): ICD-10-CM

## 2024-01-17 DIAGNOSIS — C78.6 MALIGNANT NEOPLASM METASTATIC TO PERITONEUM (H): ICD-10-CM

## 2024-01-17 RX ORDER — SORAFENIB 200 MG/1
200 TABLET, FILM COATED ORAL 2 TIMES DAILY
Qty: 60 TABLET | Refills: 0 | Status: SHIPPED | OUTPATIENT
Start: 2024-01-17 | End: 2024-03-18

## 2024-01-18 RX ORDER — MULTIVITAMIN WITH FOLIC ACID 400 MCG
1 TABLET ORAL DAILY
Qty: 30 TABLET | Refills: 11 | Status: SHIPPED | OUTPATIENT
Start: 2024-01-18

## 2024-01-18 NOTE — TELEPHONE ENCOUNTER
LVD: 10/31/2023  [facility]     Lamin Ortiz MD  Family Medicine

## 2024-01-19 ENCOUNTER — MYC MEDICAL ADVICE (OUTPATIENT)
Dept: ONCOLOGY | Facility: CLINIC | Age: 59
End: 2024-01-19
Payer: MEDICARE

## 2024-01-19 NOTE — CONFIDENTIAL NOTE
Advised that picture he sent in is bleeding under the skin probably from Taking a blood thinner.   He probably bumped himself and did not open the skin but caused a little bleeding under the skin.   It can take a few weeks to go away.   If happens with more frequency or starts having it all over body he should call triage line back.

## 2024-01-23 DIAGNOSIS — E83.42 HYPOMAGNESEMIA: ICD-10-CM

## 2024-01-23 NOTE — TELEPHONE ENCOUNTER
Pending Prescriptions:                       Disp   Refills    magnesium oxide (MAG-OX) 400 MG tablet [P*30 tab*0            Sig: TAKE 1 TABLET (400 MG) BY MOUTH DAILY    Last prescribing provider: Brenda Wilson    Last clinic visit date: 11/30/23 w/    Recommendations for requested medication (if none, N/A): N/A    Any other pertinent information (if none, N/A): N/A    Refilled: Y/N, if NO, why?

## 2024-01-24 RX ORDER — MAGNESIUM OXIDE 400 MG/1
400 TABLET ORAL DAILY
Qty: 30 TABLET | Refills: 0 | Status: SHIPPED | OUTPATIENT
Start: 2024-01-24 | End: 2024-02-21

## 2024-01-25 ENCOUNTER — VIRTUAL VISIT (OUTPATIENT)
Dept: BEHAVIORAL HEALTH | Facility: CLINIC | Age: 59
End: 2024-01-25
Payer: MEDICARE

## 2024-01-25 DIAGNOSIS — F31.31 BIPOLAR 1 DISORDER, DEPRESSED, MILD (H): Primary | ICD-10-CM

## 2024-01-25 DIAGNOSIS — F41.1 GENERALIZED ANXIETY DISORDER: ICD-10-CM

## 2024-01-25 PROCEDURE — 90834 PSYTX W PT 45 MINUTES: CPT | Mod: 95 | Performed by: PSYCHOLOGIST

## 2024-01-25 NOTE — PROGRESS NOTES
[facility] Integrated Behavioral Health  January 25, 2024          Behavioral Health Clinician Progress Note    Patient Name: Frandy Workman           Service Type: Video visit      Service Location:  Video visit      Session Start Time: 3:00 Session End Time: 3:45      Session Length: 38 - 52      Attendees: Patient    Visit Activities (Refresh list every visit): [facility] Only     Service Modality:  Video Visit:      Provider verified identity through the following two step process.  Patient provided:  Patient photo and Patient is known previously to provider    Telemedicine Visit: The patient's condition can be safely assessed and treated via synchronous audio and visual telemedicine encounter.      Reason for Telemedicine Visit: Services only offered telehealth    Originating Site (Patient Location): Patient's home    Distant Site (Provider Location): Provider Remote Setting- Home Office    Consent:  The patient/guardian has verbally consented to: the potential risks and benefits of telemedicine (video visit) versus in person care; bill my insurance or make self-payment for services provided; and responsibility for payment of non-covered services.     Patient would like the video invitation sent by:  My Chart    Mode of Communication:  Video Conference via [employer]    Distant Location (Provider):  Off-site    As the provider I attest to compliance with applicable laws and regulations related to telemedicine.      Diagnostic Assessment Date:  12/03/2020  Treatment Plan Review Date:  2/11/2024  See Flowsheets for today's PHQ-9 and LIZZETTE-7 results  Previous PHQ-9:       11/2/2023    11:01 AM 12/12/2023     2:55 PM 1/25/2024    10:23 AM   PHQ-9 SCORE   PHQ-9 Total Score MyChart 2 (Minimal depression) 2 (Minimal depression) 2 (Minimal depression)   PHQ-9 Total Score 2 2 2     Previous LIZZETTE-7:       4/7/2021    12:34 PM 9/30/2021     1:45 PM 5/17/2023     3:37 PM   LIZZETTE-7 SCORE   Total Score 9  "(mild anxiety)     Total Score 9 10 6      11/7/2017  5:23 AM 3/22/2022  1:49 PM 3/21/2023  1:43 PM 6/22/2023  9:09 AM   PROMIS-10 Scores Only       Global Mental Health Score 14  9  9  13    Global Physical Health Score 13  10  9  13    PROMIS TOTAL - SUBSCORES 27  19  18  26       9/14/2023  9:45 AM   PROMIS-10 Scores Only    Global Mental Health Score 15    Global Physical Health Score 12    PROMIS TOTAL - SUBSCORES 27            Extended Session (60+ minutes): No  Interactive Complexity: No  Crisis: No    Treatment Objective(s) Addressed in This Session:  Target Behavior(s): disease management/lifestyle changes   related to adaptive approaches to managing anxious distress and mood difficulties    Medication issue    Decrease negative self-talk    Help facilitate acceptance    Current Stressors / Issues:  [facility] met with Miller today for a follow-up, to help Miller continue to feel supported in his health behavior change and overall mental health.      Notes the conflict with his friend who lives in [place] remains unchanged. He is working on acceptance with it. Discussed how liking something is not a requirement for acceptance and broadly explored this theme. Miller notes recently driving when he notes not feeling well and forgetting to take a medication that is important for his care. He notes he was \"stupid\" though this writer normalized how making mistakes is okay and how we can learn from them. Discussed what he knows now and how he can avoid the mistakes in the future, to help Miller be proactive. Also discussed his sense of overall progress with his health, to emphasize the improvements rather than the mistakes along the way. Supportive therapy and solution-focused ideas were utilized.     Progress on Treatment Objective(s) / Homework:  Stable - ACTION (Actively working towards change); Intervened by reinforcing change plan / affirming steps taken      Solution-Focused Therapy  Explore patterns in patient's relationships " and discuss options for new behaviors.  Explore patterns in patient's actions and choices and discuss options for new behaviors.    Behavioral Activation  Discuss steps patient can take to become more involved in meaningful activity.  Identify barriers to these activities and explore possible solutions.    Acceptance and Commitment Therapy  Explore and identify important values in patient's life.  Discuss ways to commit to behavioral activation around these values.      Answers for HPI/ROS submitted by the patient on 3/21/2022  If you checked off any problems, how difficult have these problems made it for you to do your work, take care of things at home, or get along with other people?: Not difficult at all  PHQ9 TOTAL SCORE: 6      Answers for HPI/ROS submitted by the patient on 2/8/2022  If you checked off any problems, how difficult have these problems made it for you to do your work, take care of things at home, or get along with other people?: Somewhat difficult  PHQ9 TOTAL SCORE: 4      Answers for HPI/ROS submitted by the patient on 4/7/2021   LIZZETTE 7 TOTAL SCORE: 9  If you checked off any problems, how difficult have these problems made it for you to do your work, take care of things at home, or get along with other people?: Very difficult  PHQ9 TOTAL SCORE: 7*    *No SI    Answers for HPI/ROS submitted by the patient on 10/13/2020   If you checked off any problems, how difficult have these problems made it for you to do your work, take care of things at home, or get along with other people?: Somewhat difficult  PHQ9 TOTAL SCORE: 8*  LIZZETTE 7 TOTAL SCORE: 6      *No SI     Answers for HPI/ROS submitted by the patient on 12/29/2020   If you checked off any problems, how difficult have these problems made it for you to do your work, take care of things at home, or get along with other people?: Somewhat difficult  PHQ9 TOTAL SCORE: 5*  LIZZETTE 7 TOTAL SCORE: 8    *No SI     Answers for HPI/ROS submitted by the patient  on 11/21/2022  If you checked off any problems, how difficult have these problems made it for you to do your work, take care of things at home, or get along with other people?: Very difficult  PHQ9 TOTAL SCORE: 4          Care Plan review completed: no    Medication Review:  No changes to current psychiatric medication(s)     Reports discontinuing zolpidem.       Current Outpatient Medications   Medication    albuterol (PROAIR HFA/PROVENTIL HFA/VENTOLIN HFA) 108 (90 Base) MCG/ACT inhaler    Alcohol Swabs (ALCOHOL PREP) 70 % PADS    ammonium lactate (AMLACTIN) 12 % external cream    apixaban ANTICOAGULANT (ELIQUIS) 5 MG tablet    BD VIKTORIA U/F 32G X 4 MM insulin pen needle    benzoyl peroxide 5 % external liquid    blood glucose (NO BRAND SPECIFIED) lancets standard    Continuous Blood Gluc Sensor (FREESTYLE CLAUDIA 2 SENSOR) MISC    empagliflozin (JARDIANCE) 10 MG TABS tablet    insulin aspart (NOVOLOG PEN) 100 UNIT/ML pen    insulin degludec (TRESIBA FLEXTOUCH) 100 UNIT/ML pen    K-Phos-Neutral [id number] MG tablet    ketoconazole (NIZORAL) 2 % external shampoo    lamiVUDine (EPIVIR) 100 MG tablet    lisinopril (ZESTRIL) 5 MG tablet    loperamide (IMODIUM A-D) 2 MG tablet    magnesium oxide (MAG-OX) 400 MG tablet    metoprolol succinate ER (TOPROL XL) 25 MG 24 hr tablet    Multiple Vitamin (DAILY-GLADIS MULTIVITAMIN) TABS    mycophenolate (GENERIC EQUIVALENT) 250 MG capsule    NIFEdipine ER OSMOTIC (PROCARDIA XL) 60 MG 24 hr tablet    ondansetron (ZOFRAN ODT) 8 MG ODT tab    pantoprazole (PROTONIX) 40 MG EC tablet    predniSONE (DELTASONE) 5 MG tablet    rosuvastatin (CRESTOR) 20 MG tablet    sodium zirconium cyclosilicate (LOKELMA) 10 g PACK packet    SORAfenib (NEXAVAR) 200 MG tablet    tamsulosin (FLOMAX) 0.4 MG capsule    Vitamin D3 50 mcg (2000 units) tablet     Current Facility-Administered Medications   Medication    aflibercept (EYLEA) injection prefilled syringe 2 mg    aflibercept (EYLEA) injection prefilled  syringe 2 mg    dexamethasone (OZURDEX) intravitreal implant 0.7 mg    dexamethasone (OZURDEX) intravitreal implant 0.7 mg    faricimab-svoa (VABYSMO) intravitreal injection 6 mg    faricimab-svoa (VABYSMO) intravitreal injection 6 mg    faricimab-svoa (VABYSMO) intravitreal injection 6 mg    lidocaine (PF) (XYLOCAINE) injection 1 mL       Medication Compliance:  Yes    Changes in Health Issues:   None reported    Chemical Use Review:   Substance Use: Chemical use reviewed, no active concerns identified      Tobacco Use: No current tobacco use.         Assessment: Current Emotional / Mental Status (status of significant symptoms):  Risk status (Self / Other harm or suicidal ideation)  Patient denies a history of suicidal ideation, suicide attempts, self-injurious behavior, homicidal ideation, homicidal behavior and and other safety concerns     Patient denies current fears or concerns for personal safety.  Patient denies current or recent suicidal ideation or behaviors.  Patient denies current or recent homicidal ideation or behaviors.  Patient denies current or recent self injurious behavior or ideation.  Patient denies other safety concerns.     A safety and risk management plan has not been developed at this time, however patient was encouraged to call [place] [phone] should there be a change in any of these risk factors.    [place] Suicide Severity Rating Scale (Short Version)      7/1/2020    11:00 AM 7/12/2021     2:30 PM 1/10/2022     9:28 PM 1/3/2023     6:31 AM 1/24/2023     6:22 AM 7/13/2023    10:31 AM 8/10/2023     3:31 PM   [place] Suicide Severity Rating (Short Version)   Over the past 2 weeks have you felt down, depressed, or hopeless? no no no no no no no   Over the past 2 weeks have you had thoughts of killing yourself? no no no no  no no   Have you ever attempted to kill yourself? no no no no  no no   Q1 Wished to be Dead (Past Month)    no      Q2 Suicidal Thoughts (Past Month)    no      Q3  Suicidal Thought Method    no      Q4 Suicidal Intent without Specific Plan    no      Q5 Suicide Intent with Specific Plan    no      Q6 Suicide Behavior (Lifetime)    no      Level of Risk per Screen    low risk            Appearance:   Appropriate   Eye Contact:   Good   Psychomotor Behavior: TD evident - improving  Attitude:   Cooperative  Interested Friendly Pleasant  Orientation:   All  Speech   Rate / Production: Normal/ Responsive   Volume:  Normal   Mood:    Depressed ; improving  Affect:    Appropriate    Thought Content:  Clear   Thought Form:  Goal Directed  Logical   Insight:    Good     Diagnoses:  1. Bipolar 1 disorder, depressed, mild (H)    2. Generalized anxiety disorder                Collateral Reports Completed:  Not Applicable    Plan: (Homework, other):  Continue with this writer for individual psychotherapy in 2/3 wks. He will continue to work on managing depression and anxiety through achievable behavioral activation ideas.Information about grief management given today.  No CD issues.     Joseph Murillo Psy.D, RED   Behavioral Health Clinician   [facility]          ______________________________________________________________________                                            Individual Treatment Plan    Patient's Name: Frandy Workman  YOB: 1965    Date of Creation: 3/1/2022  Date Treatment Plan Last Reviewed/Revised: 11/3/2023    DSM5 Diagnoses: 296.51 Bipolar I Disorder Current or Most Recent Episode Depressed, Mild or 300.02 (F41.1) Generalized Anxiety Disorder  Psychosocial / Contextual Factors: Post Solid Organ Tx  PROMIS (reviewed every 90 days):     11/7/2017  5:23 AM 3/22/2022  1:49 PM 3/21/2023  1:43 PM 6/22/2023  9:09 AM   PROMIS-10 Scores Only       Global Mental Health Score 14  9  9  13    Global Physical Health Score 13  10  9  13    PROMIS TOTAL - SUBSCORES 27  19  18  26       9/14/2023  9:45 AM   PROMIS-10 Scores Only     Global Mental Health Score 15    Global Physical Health Score 12    PROMIS TOTAL - SUBSCORES 27          Referral / Collaboration:  Referral to another professional/service is not indicated at this time..    Anticipated number of session for this episode of care: 4-6  Anticipation frequency of session: Every other week  Anticipated Duration of each session: 38-52 minutes  Treatment plan will be reviewed in 90 days or when goals have been changed.       MeasurableTreatment Goal(s) related to diagnosis / functional impairment(s)  Goal 1: Patient will experience a reduction in depressive symptoms along with a corresponding increase in positive emotion and life satisfaction.      Objective #A (Patient Action)    Patient will Increase interest, engagement, and pleasure in doing things.  Status: Continued - Date(s): 11/3/2023    Intervention(s)  Therapist will help patient identify pleasant and mastery oriented events that elicit positive, relaxed mood.    Objective #B  Patient will Decrease frequency and intensity of feeling down, depressed, hopeless.  Status: Continued - Date(s): 11/3/2023    Intervention(s)  Therapist will introduce patient to cognitive-behavioral and acceptance and commitment therapy topics aimed to help reduce depression and anxiety    Objective #C  Patient will Identify negative self-talk and behaviors: challenge core beliefs, myths, and actions  Improve concentration, focus, and mindfulness in daily activities .  Status: Continued - Date(s): COMPLETE    Intervention(s)  Therapist will help patient identify and manage negative self-talk and automatic thoughts; introduce patient to cognitive distortions; help patient develop cognitive diffusion techniques      Goal 2: Patient will experience a reduction in anxious symptoms, along with a corresponding increase in relaxed emotional states and life satisfaction.      Objective #A (Patient Action)  Patient will use cognitive-behavioral and thought  "diffusion strategies identified in therapy to challenge anxious thoughts.    Status: Continued - Date(s): COMPLETE    Intervention(s)  Therapist will utilize CBT and ACT ideas to help patient challenge anxious thoughts and reduce intensity/duration of anxious distress    Objective #B  Patient will use \"worry time\" each day for 15 minutes of scheduled worry and then defer obsessive or anxious thinking until the next structured worry time.    Status: Continued - Date(s): COMPLETE    Intervention(s)  Therapist will teach patient how to effectively utilize worry time and/or thought logs/journals each day and incorporate more relaxation behaviors into their routine.    Objective #C  Patient will identify the stressors which contribute to feelings of anxiety  Patient will increase engagement in adaptive coping skills and recreational activities, such as exercise and healthy socialization, to manage distress.  Status: Continued - Date(s): COMPLETE    Intervention(s)  Therapist will help patient identify triggers/situational factors that contribute to anxiety and behavioral skills aimed to manage anxious distress.      Goal 3: Patiient will work toward adaptively managing bipolar-related depression and manic episodes      Objective #A (Patient Action)    Status: Continued - Date(s): COMPLETE    Patient will identify 2-3 signs or signals of emerging mood instability.    Intervention(s)  Therapist will provide educational materials on bipolar disorder and help identifying triggers for mood instability .    Objective #B  Patient will identify 2-3 strategies for more effectively managing Bipolar Disorder.    Status: Continued - Date(s): COMPLETE    Intervention(s)  Therapist will teach emotional recognition/identification. Skills aimed to effectively manage bipolar disorder .      Other Possible Therapeutic Intervention(s):    Psycho-education regarding mental health diagnoses and treatment options    Supportive " Therapy  Provide affirmations, reflections, and establish working rapport  Emphasize and reflect on strength of therapeutic relationship    Skills training  Explore skills useful to client in current situation.  Skills include assertiveness, communication, conflict management, problem-solving, relaxation, etc.    Solution-Focused Therapy  Explore patterns in patient's relationships and discuss options for new behaviors.  Explore patterns in patient's actions and choices and discuss options for new behaviors.    Cognitive-behavioral Therapy  Discuss common cognitive distortions, identify them in patient's life.  Explore ways to challenge, replace, and act against these cognitions.    Acceptance and Commitment Therapy  Explore and identify important values in patient's life.  Discuss ways to commit to behavioral activation around these values.    Psychodynamic psychotherapy  Discuss patient's emotional dynamics and issues and how they impact behaviors.  Explore patient's history of relationships and how they impact present behaviors.  Explore how to work with and make changes in these schemas and patterns.    Narrative Therapy  Explore the patient's story of his/her life from his/her perspective.  Explore alternate ways of understanding their experience, identifying exceptions, developing new themes.    Interpersonal Psychotherapy  Explore patterns in relationships that are effective or ineffective at helping patient reach their goals, find satisfying experience.  Discuss new patterns or behaviors to engage in for improved social functioning.    Behavioral Activation  Discuss steps patient can take to become more involved in meaningful activity.  Identify barriers to these activities and explore possible solutions.    Mindfulness-Based Strategies  Discuss skills based on development and application of mindfulness.  Skills drawn from compassion-focused therapy, dialectical behavior therapy, mindfulness-based stress  reduction, mindfulness-based cognitive therapy, etc.      Patient has reviewed and agreed to the above plan.      Joseph Murillo Psy.D, RED   Behavioral Health Clinician   -[facility]     11/3/2023    Answers for HPI/ROS submitted by the patient on 2/28/2023  If you checked off any problems, how difficult have these problems made it for you to do your work, take care of things at home, or get along with other people?: Very difficult  PHQ9 TOTAL SCORE: 6    Answers for HPI/ROS submitted by the patient on 6/28/2023  If you checked off any problems, how difficult have these problems made it for you to do your work, take care of things at home, or get along with other people?: Somewhat difficult  PHQ9 TOTAL SCORE: 3Answers submitted by the patient for this visit:  Patient Health Questionnaire (Submitted on 9/20/2023)  If you checked off any problems, how difficult have these problems made it for you to do your work, take care of things at home, or get along with other people?: Somewhat difficult  PHQ9 TOTAL SCORE: 5  Answers submitted by the patient for this visit:  Patient Health Questionnaire (Submitted on 11/2/2023)  If you checked off any problems, how difficult have these problems made it for you to do your work, take care of things at home, or get along with other people?: Somewhat difficult  PHQ9 TOTAL SCORE: 2    Answers submitted by the patient for this visit:  Patient Health Questionnaire (Submitted on 1/25/2024)  If you checked off any problems, how difficult have these problems made it for you to do your work, take care of things at home, or get along with other people?: Somewhat difficult  PHQ9 TOTAL SCORE: 2

## 2024-01-26 DIAGNOSIS — E11.9 TYPE 2 DIABETES MELLITUS WITHOUT COMPLICATION, WITH LONG-TERM CURRENT USE OF INSULIN (H): Primary | ICD-10-CM

## 2024-01-26 DIAGNOSIS — N18.32 STAGE 3B CHRONIC KIDNEY DISEASE (CKD) (H): Primary | ICD-10-CM

## 2024-01-26 DIAGNOSIS — Z79.4 TYPE 2 DIABETES MELLITUS WITHOUT COMPLICATION, WITH LONG-TERM CURRENT USE OF INSULIN (H): Primary | ICD-10-CM

## 2024-01-26 DIAGNOSIS — R11.2 NAUSEA AND VOMITING, UNSPECIFIED VOMITING TYPE: ICD-10-CM

## 2024-01-30 ENCOUNTER — ANCILLARY PROCEDURE (OUTPATIENT)
Dept: CT IMAGING | Facility: CLINIC | Age: 59
End: 2024-01-30
Attending: INTERNAL MEDICINE
Payer: MEDICARE

## 2024-01-30 ENCOUNTER — OFFICE VISIT (OUTPATIENT)
Dept: FAMILY MEDICINE | Facility: CLINIC | Age: 59
End: 2024-01-30
Payer: MEDICARE

## 2024-01-30 ENCOUNTER — LAB (OUTPATIENT)
Dept: LAB | Facility: CLINIC | Age: 59
End: 2024-01-30
Attending: INTERNAL MEDICINE
Payer: MEDICARE

## 2024-01-30 VITALS
HEIGHT: 69 IN | WEIGHT: 160 LBS | BODY MASS INDEX: 23.7 KG/M2 | SYSTOLIC BLOOD PRESSURE: 118 MMHG | OXYGEN SATURATION: 100 % | DIASTOLIC BLOOD PRESSURE: 70 MMHG | HEART RATE: 87 BPM

## 2024-01-30 DIAGNOSIS — Z94.4 LIVER TRANSPLANT RECIPIENT (H): ICD-10-CM

## 2024-01-30 DIAGNOSIS — N18.31 CHRONIC KIDNEY DISEASE, STAGE 3A (H): ICD-10-CM

## 2024-01-30 DIAGNOSIS — C78.6 MALIGNANT NEOPLASM METASTATIC TO PERITONEUM (H): ICD-10-CM

## 2024-01-30 DIAGNOSIS — Z94.4 LIVER REPLACED BY TRANSPLANT (H): ICD-10-CM

## 2024-01-30 DIAGNOSIS — C22.0 HCC (HEPATOCELLULAR CARCINOMA) (H): ICD-10-CM

## 2024-01-30 DIAGNOSIS — I10 BENIGN ESSENTIAL HYPERTENSION: Primary | ICD-10-CM

## 2024-01-30 LAB
AFP SERPL-MCNC: 2.4 NG/ML
ALBUMIN SERPL BCG-MCNC: 4.5 G/DL (ref 3.5–5.2)
ALBUMIN SERPL BCG-MCNC: 4.6 G/DL (ref 3.5–5.2)
ALP SERPL-CCNC: 91 U/L (ref 40–150)
ALP SERPL-CCNC: 94 U/L (ref 40–150)
ALT SERPL W P-5'-P-CCNC: 22 U/L (ref 0–70)
ALT SERPL W P-5'-P-CCNC: 23 U/L (ref 0–70)
ANION GAP SERPL CALCULATED.3IONS-SCNC: 9 MMOL/L (ref 7–15)
AST SERPL W P-5'-P-CCNC: 21 U/L (ref 0–45)
AST SERPL W P-5'-P-CCNC: 22 U/L (ref 0–45)
BASOPHILS # BLD AUTO: 0 10E3/UL (ref 0–0.2)
BASOPHILS NFR BLD AUTO: 0 %
BILIRUB DIRECT SERPL-MCNC: <0.2 MG/DL (ref 0–0.3)
BILIRUB SERPL-MCNC: 0.4 MG/DL
BILIRUB SERPL-MCNC: 0.4 MG/DL
BUN SERPL-MCNC: 34.5 MG/DL (ref 8–23)
CALCIUM SERPL-MCNC: 8.9 MG/DL (ref 8.6–10)
CHLORIDE SERPL-SCNC: 101 MMOL/L (ref 98–107)
CREAT BLD-MCNC: 1.7 MG/DL (ref 0.7–1.3)
CREAT SERPL-MCNC: 1.72 MG/DL (ref 0.67–1.17)
DEPRECATED HCO3 PLAS-SCNC: 24 MMOL/L (ref 22–29)
EGFRCR SERPLBLD CKD-EPI 2021: 45 ML/MIN/1.73M2
EGFRCR SERPLBLD CKD-EPI 2021: 46 ML/MIN/1.73M2
EOSINOPHIL # BLD AUTO: 0.1 10E3/UL (ref 0–0.7)
EOSINOPHIL NFR BLD AUTO: 1 %
ERYTHROCYTE [DISTWIDTH] IN BLOOD BY AUTOMATED COUNT: 14.7 % (ref 10–15)
GLUCOSE SERPL-MCNC: 124 MG/DL (ref 70–99)
HCT VFR BLD AUTO: 36.2 % (ref 40–53)
HGB BLD-MCNC: 11.4 G/DL (ref 13.3–17.7)
IMM GRANULOCYTES # BLD: 0 10E3/UL
IMM GRANULOCYTES NFR BLD: 1 %
LYMPHOCYTES # BLD AUTO: 1 10E3/UL (ref 0.8–5.3)
LYMPHOCYTES NFR BLD AUTO: 15 %
MAGNESIUM SERPL-MCNC: 2.2 MG/DL (ref 1.7–2.3)
MCH RBC QN AUTO: 30.9 PG (ref 26.5–33)
MCHC RBC AUTO-ENTMCNC: 31.5 G/DL (ref 31.5–36.5)
MCV RBC AUTO: 98 FL (ref 78–100)
MONOCYTES # BLD AUTO: 0.4 10E3/UL (ref 0–1.3)
MONOCYTES NFR BLD AUTO: 5 %
NEUTROPHILS # BLD AUTO: 5.2 10E3/UL (ref 1.6–8.3)
NEUTROPHILS NFR BLD AUTO: 78 %
NRBC # BLD AUTO: 0 10E3/UL
NRBC BLD AUTO-RTO: 0 /100
PHOSPHATE SERPL-MCNC: 3.5 MG/DL (ref 2.5–4.5)
PLATELET # BLD AUTO: 182 10E3/UL (ref 150–450)
POTASSIUM SERPL-SCNC: 5.2 MMOL/L (ref 3.4–5.3)
PROT SERPL-MCNC: 7.1 G/DL (ref 6.4–8.3)
PROT SERPL-MCNC: 7.3 G/DL (ref 6.4–8.3)
RBC # BLD AUTO: 3.69 10E6/UL (ref 4.4–5.9)
SODIUM SERPL-SCNC: 134 MMOL/L (ref 135–145)
WBC # BLD AUTO: 6.6 10E3/UL (ref 4–11)

## 2024-01-30 PROCEDURE — 71260 CT THORAX DX C+: CPT | Mod: MG | Performed by: RADIOLOGY

## 2024-01-30 PROCEDURE — 74178 CT ABD&PLV WO CNTR FLWD CNTR: CPT | Mod: MG | Performed by: RADIOLOGY

## 2024-01-30 PROCEDURE — 84100 ASSAY OF PHOSPHORUS: CPT

## 2024-01-30 PROCEDURE — G1010 CDSM STANSON: HCPCS | Performed by: RADIOLOGY

## 2024-01-30 PROCEDURE — 82105 ALPHA-FETOPROTEIN SERUM: CPT

## 2024-01-30 PROCEDURE — 99213 OFFICE O/P EST LOW 20 MIN: CPT | Performed by: FAMILY MEDICINE

## 2024-01-30 PROCEDURE — 82040 ASSAY OF SERUM ALBUMIN: CPT

## 2024-01-30 PROCEDURE — 83735 ASSAY OF MAGNESIUM: CPT

## 2024-01-30 PROCEDURE — 85025 COMPLETE CBC W/AUTO DIFF WBC: CPT

## 2024-01-30 PROCEDURE — 36415 COLL VENOUS BLD VENIPUNCTURE: CPT

## 2024-01-30 PROCEDURE — 80053 COMPREHEN METABOLIC PANEL: CPT | Performed by: PATHOLOGY

## 2024-01-30 RX ORDER — IOPAMIDOL 755 MG/ML
85 INJECTION, SOLUTION INTRAVASCULAR ONCE
Status: COMPLETED | OUTPATIENT
Start: 2024-01-30 | End: 2024-01-30

## 2024-01-30 RX ADMIN — IOPAMIDOL 85 ML: 755 INJECTION, SOLUTION INTRAVASCULAR at 10:12

## 2024-01-30 NOTE — PROGRESS NOTES
Miller is a 58 year old that presents in clinic today for the following:     Chief Complaint   Patient presents with    Follow Up    Medication Question     Pt is wondering if he can stop using the nasal spray.           1/30/2024     8:32 AM   Additional Questions   Roomed by Leandra Rehman     Screenings from encounters over the past 10 days    No data recorded       Leandra Rehman at 8:36 AM on 1/30/2024    Assessment & Plan     Benign essential hypertension  Cont as is    All needed specialty cares utd or arranged, rvwd      22 minutes spent by me on the date of the encounter doing chart review, history and exam, documentation and further activities per the note          Return in about 3 months (around 4/30/2024).    Subjective   Miller is a 58 year old, presenting for the following health issues:  Follow Up and Medication Question (Pt is wondering if he can stop using the nasal spray.)      1/30/2024     8:32 AM   Additional Questions   Roomed by Leandra Rehman     HPI Here for a few things  One, overall doing well; doing lab and ct today for other providers  Two, plans fun weekend in [place] for [place] bd coming up, avoids etoh  Three, no labs due not being ordered already; he knows can do colonoscoy later this year (per pt strong FH colon ca, last one end 2019)    Past Medical History:   Diagnosis Date    Anemia 2013    Arthritis     BPH (benign prostatic hyperplasia)     CAD (coronary artery disease) 04/01/2019    Cholelithiasis     Conductive hearing loss 08/16/2017    Depressive disorder 1986    Suffer effects throughout life    Gastroesophageal reflux disease 12/01/2014    HCC (hepatocellular carcinoma) (H) 01/22/2019    History of blood transfusion 2019    Had blood transfussion until Dec 2022    History of diabetic retinopathy 07/2018    HTN (hypertension) 11/20/2019    Hyperlipidemia     Liver cirrhosis secondary to ESTRADA (H)     Liver transplanted (H) 11/11/2019    Portal vein thrombosis 04/11/2019     Type II diabetes mellitus (H)      Past Surgical History:   Procedure Laterality Date    ABDOMEN SURGERY  11/11/2019    Had Liver Transplant    BIOPSY  12/2022    Had biopsy done will have additional procedure 2/15/2023    BYPASS GRAFT ARTERY CORONARY N/A 07/14/2021    Procedure: median sternotomy, on cardiopulmonary bypass, CORONARY ARTERY BYPASS GRAFT (CABG) x2 with left greater saphenous vein endoscopic harvest and left internal mammery artery harvest;  Surgeon: Tom Zapata MD;  Location: [facility] OR    CARDIAC SURGERY  7/2021    Heart Graph. and bypass surgery    CHOLECYSTECTOMY  11/11/2022    Removed with Liver Transplant    COLONOSCOPY      2015    COLONOSCOPY N/A 12/06/2019    Procedure: COLONOSCOPY, WITH POLYPECTOMY AND BIOPSY;  Surgeon: Adam Morton MD;  Location:  GI    CV CENTRAL VENOUS CATHETER PLACEMENT N/A 07/12/2021    Procedure: Central Venous Catheter Placement;  Surgeon: Fermin Polanco MD;  Location: [facility] CARDIAC CATH LAB    CV CORONARY ANGIOGRAM N/A 07/12/2021    Procedure: Coronary Angiogram;  Surgeon: Fermin Polanco MD;  Location: [facility] CARDIAC CATH LAB    CV HEART CATHETERIZATION WITH POSSIBLE INTERVENTION N/A 02/26/2019    Procedure: CORS;  Surgeon: Jagdish Hoyt MD;  Location: [facility] CARDIAC CATH LAB    CV INTRA AORTIC BALLOON N/A 07/12/2021    Procedure: Intra Aortic Balloon Pump Insertion;  Surgeon: Fermin Polanoc MD;  Location: [facility] CARDIAC CATH LAB    ESOPHAGOSCOPY, GASTROSCOPY, DUODENOSCOPY (EGD), COMBINED N/A 11/17/2016    Procedure: COMBINED ESOPHAGOSCOPY, GASTROSCOPY, DUODENOSCOPY (EGD);  Surgeon: Santi Rosas MD;  Location:  GI    ESOPHAGOSCOPY, GASTROSCOPY, DUODENOSCOPY (EGD), COMBINED N/A 11/17/2017    Procedure: COMBINED ESOPHAGOSCOPY, GASTROSCOPY, DUODENOSCOPY (EGD);  EGD;  Surgeon: Santi Rosas MD;  Location:  GI    ESOPHAGOSCOPY, GASTROSCOPY, DUODENOSCOPY (EGD), COMBINED  N/A 12/28/2018    Procedure: EGD;  Surgeon: Santi Rosas MD;  [facility]: [facility]    ESOPHAGOSCOPY, GASTROSCOPY, DUODENOSCOPY (EGD), COMBINED N/A 12/06/2019    Procedure: ESOPHAGOGASTRODUODENOSCOPY, WITH BIOPSY;  Surgeon: Adam Morton MD;  [facility]    ESOPHAGOSCOPY, GASTROSCOPY, DUODENOSCOPY (EGD), COMBINED N/A 02/13/2020    Procedure: ESOPHAGOGASTRODUODENOSCOPY (EGD);  Surgeon: Santi Rosas MD;  Location:  GI    EXCISE MASS ABDOMEN N/A 07/13/2023    Procedure: Laparoscopic lysis of adhesions, laparoscopic resection of abdominal mass;  Surgeon: Ruiz Chu MD;  [facility]    HEAD & NECK SURGERY      12/2017 at [facility].     IMPLANT GOLD WEIGHT EYELID Right 11/16/2017    Procedure: IMPLANT WEIGHT EYELID;  Right Upper Eyelid Weight, right tarsal strip lower eyelid;  Surgeon: Milana Malave MD;  [facility]    IR CHEMO EMBOLIZATION  01/22/2019    KNEE SURGERY Left     ORTHOPEDIC SURGERY      PAROTIDECTOMY, RADICAL NECK DISSECTION Right 11/02/2017    Procedure: PAROTIDECTOMY, RADICAL NECK DISSECTION;  Right Superfacial Parotidectomy , Facial nerve repair. with [facility] facial nerve monitor.;  Surgeon: Asiya Morgan MD;  Location: [facility] OR    PHACOEMULSIFICATION CLEAR CORNEA WITH STANDARD INTRAOCULAR LENS IMPLANT Right 01/24/2023    Procedure: PHACOEMULSIFICATION, COMPLEX CATARACT, WITH INTRAOCULAR LENS IMPLANT WITH TRYPAN RIGHT EYE;  Surgeon: Enriqueta Martin MD;  [place] [facility] [place]    PHACOEMULSIFICATION WITH STANDARD INTRAOCULAR LENS IMPLANT Left 01/03/2023    Procedure: PHACOEMULSIFICATION, COMPLEX CATARACT, WITH STANDARD INTRAOCULAR LENS IMPLANT INSERTION LEFT EYE;  Surgeon: Enriqueta Martin MD;  Location: [facility] OR    PICC INSERTION Left 11/06/2017    4fr SL BioFlo PICC, 44cm in the L basilic vein w/ tip in the low SVC    RETURN LIVER TRANSPLANT N/A 11/12/2019    Procedure: Exploratory laparotomy, hematoma evacuation, abdominal washout;  Surgeon:  Александр Ramos MD;  Location: [facility] OR    TRANSPLANT LIVER RECIPIENT  DONOR N/A 2019    Procedure: TRANSPLANT, LIVER, RECIPIENT,  DONOR;  Surgeon: Александр Ramos MD;  Location: [facility] OR    VASCULAR SURGERY       Current Outpatient Medications   Medication    albuterol (PROAIR HFA/PROVENTIL HFA/VENTOLIN HFA) 108 (90 Base) MCG/ACT inhaler    Alcohol Swabs (ALCOHOL PREP) 70 % PADS    ammonium lactate (AMLACTIN) 12 % external cream    apixaban ANTICOAGULANT (ELIQUIS) 5 MG tablet    BD VIKTORIA U/F 32G X 4 MM insulin pen needle    benzoyl peroxide 5 % external liquid    blood glucose (NO BRAND SPECIFIED) lancets standard    Continuous Blood Gluc Sensor (FREESTYLE CLAUDIA 2 SENSOR) MISC    empagliflozin (JARDIANCE) 10 MG TABS tablet    insulin aspart (NOVOLOG PEN) 100 UNIT/ML pen    insulin degludec (TRESIBA FLEXTOUCH) 100 UNIT/ML pen    K-Phos-Neutral 155-852-130 MG tablet    ketoconazole (NIZORAL) 2 % external shampoo    lamiVUDine (EPIVIR) 100 MG tablet    lisinopril (ZESTRIL) 5 MG tablet    loperamide (IMODIUM A-D) 2 MG tablet    magnesium oxide (MAG-OX) 400 MG tablet    metoprolol succinate ER (TOPROL XL) 25 MG 24 hr tablet    Multiple Vitamin (DAILY-GLADIS MULTIVITAMIN) TABS    mycophenolate (GENERIC EQUIVALENT) 250 MG capsule    NIFEdipine ER OSMOTIC (PROCARDIA XL) 60 MG 24 hr tablet    ondansetron (ZOFRAN ODT) 8 MG ODT tab    pantoprazole (PROTONIX) 40 MG EC tablet    predniSONE (DELTASONE) 5 MG tablet    rosuvastatin (CRESTOR) 20 MG tablet    sodium zirconium cyclosilicate (LOKELMA) 10 g PACK packet    SORAfenib (NEXAVAR) 200 MG tablet    tamsulosin (FLOMAX) 0.4 MG capsule    Vitamin D3 50 mcg (2000 units) tablet     Current Facility-Administered Medications   Medication    aflibercept (EYLEA) injection prefilled syringe 2 mg    aflibercept (EYLEA) injection prefilled syringe 2 mg    dexamethasone (OZURDEX) intravitreal implant 0.7 mg    dexamethasone (OZURDEX) intravitreal implant 0.7 mg     "faricimab-svoa (VABYSMO) intravitreal injection 6 mg    faricimab-svoa (VABYSMO) intravitreal injection 6 mg    faricimab-svoa (VABYSMO) intravitreal injection 6 mg    lidocaine (PF) (XYLOCAINE) injection 1 mL     Allergies   Allergen Reactions    Codeine Other (See Comments)     Cannot take due to liver  Cannot tolerate oral narcotics    Seasonal Allergies      Sneezing, coughing, runny and itchy eyes           Objective    /70 (BP Location: Left arm, Patient Position: Sitting, Cuff Size: Adult Regular)   Pulse 87   Ht 1.753 m (5' 9.02\")   Wt 72.6 kg (160 lb)   SpO2 100%   BMI 23.62 kg/m    Body mass index is 23.62 kg/m .  Physical Exam   GENERAL: alert and no distress  MS: no gross musculoskeletal defects noted, no edema  PSYCH: mentation appears normal, affect normal/bright          Signed Electronically by: Lamin Ortiz MD        "

## 2024-01-30 NOTE — NURSING NOTE
Chief Complaint   Patient presents with    Blood Draw     Labs drawn via PIV By RN in lab.        Carla Berry RN

## 2024-01-30 NOTE — DISCHARGE INSTRUCTIONS

## 2024-01-31 RX ORDER — ONDANSETRON 8 MG/1
8 TABLET, ORALLY DISINTEGRATING ORAL EVERY 8 HOURS PRN
Qty: 15 TABLET | Refills: 3 | Status: SHIPPED | OUTPATIENT
Start: 2024-01-31

## 2024-02-01 ENCOUNTER — VIRTUAL VISIT (OUTPATIENT)
Dept: ONCOLOGY | Facility: CLINIC | Age: 59
End: 2024-02-01
Attending: INTERNAL MEDICINE
Payer: MEDICARE

## 2024-02-01 DIAGNOSIS — C22.0 HCC (HEPATOCELLULAR CARCINOMA) (H): Primary | ICD-10-CM

## 2024-02-01 DIAGNOSIS — Z94.4 LIVER TRANSPLANT RECIPIENT (H): ICD-10-CM

## 2024-02-01 DIAGNOSIS — N18.32 STAGE 3B CHRONIC KIDNEY DISEASE (H): ICD-10-CM

## 2024-02-01 DIAGNOSIS — N18.32 ANEMIA OF CHRONIC RENAL FAILURE, STAGE 3B (H): ICD-10-CM

## 2024-02-01 DIAGNOSIS — C78.6 MALIGNANT NEOPLASM METASTATIC TO PERITONEUM (H): ICD-10-CM

## 2024-02-01 DIAGNOSIS — E11.3293 TYPE 2 DIABETES MELLITUS WITH MILD NONPROLIFERATIVE RETINOPATHY OF BOTH EYES WITHOUT MACULAR EDEMA, UNSPECIFIED WHETHER LONG TERM INSULIN USE (H): ICD-10-CM

## 2024-02-01 DIAGNOSIS — D63.1 ANEMIA OF CHRONIC RENAL FAILURE, STAGE 3B (H): ICD-10-CM

## 2024-02-01 PROCEDURE — 99215 OFFICE O/P EST HI 40 MIN: CPT | Mod: 95 | Performed by: INTERNAL MEDICINE

## 2024-02-01 RX ORDER — PEN NEEDLE, DIABETIC 32GX 5/32"
NEEDLE, DISPOSABLE MISCELLANEOUS
Qty: 300 EACH | Refills: 3 | Status: SHIPPED | OUTPATIENT
Start: 2024-02-01

## 2024-02-01 NOTE — LETTER
2/1/2024         RE: Frandy Workman  [address] [address]        I am seeing Miller Rollins today in follow-up of metastatic hepatocellular carcinoma.    He is turning 59 years old and was diagnosed 5 years ago with 2 lesions in the liver treated with TACE followed by liver transplant in 2019.  In June 2023 he developed new peritoneal metastases at the end of July started therapy with sorafenib at 400 mg twice per day.  This was complicated by diarrhea and severe nausea and vomiting that resulted in hospitalization with acute renal failure.  Upon recovery he was able to resume active treatment with just 200 mg twice per day and attempts to escalate his dosing resulted in too much GI toxicity and so he has been on 200 mg twice per day ever since.  He returns today for his next planned response assessment.    He reports that overall he feels like he is doing well.  He had symptoms of an upper respiratory infection last week but those seem to have resolved without ongoing sequelae.  He has some minor leg pain intermittently.  He is regularly walking several miles per day.  His appetite and weight have been stable.    On exam via the video link he appears older than his stated age but otherwise well.    I personally reviewed his CT scan went over the results with him.  That shows his very small peritoneal nodules to be stable to may be improved.  I do not see any new sites of disease.    His alpha-fetoprotein remains normal at 2.4.  His electrolytes are normal with a creatinine of 1.72, slightly worse than recent values.  His bilirubin, albumin and liver enzymes are normal.  He has improving normocytic anemia with a hemoglobin of 11.4 and otherwise normal blood counts.    Assessment/plan: Metastatic hepatocellular carcinoma in a patient with a prior liver transplant with stable disease on reduced dose sorafenib.  I do not think we want to make any more attempts to try and escalate his dose and will  continue on at the same dose and schedule.  His disease related anemia seems to be improving, though some of that improvement may simply be representative of some volume contraction.  His chronic renal failure is perhaps slightly worse and I encouraged him to maintain good oral intake.  His transplanted liver seems to be functioning well to continue on his current dosing of mycophenolate.  He seems to be doing well with his diabetes management with his last hemoglobin A1c being 5.7.  He will continue on with his anticoagulation for his prior DVT.    We will plan on seeing him back after another 2 to 3 months of ongoing treatment with sorafenib for another response assessment.        Miguel Villarreal MD

## 2024-02-01 NOTE — PROGRESS NOTES
Miller is a 58 year old who is being evaluated via a billable video visit.          Video-Visit Details    Type of service:  Video Visit   Video Start Time:  11:00  Video End Time: 11:30    Originating Location (pt. Location): Home  Distant Location (provider location):  Off-site  Platform used for Video Visit: [employer]          I am seeing Miller Rollins today in follow-up of metastatic hepatocellular carcinoma.    He is turning 59 years old and was diagnosed 5 years ago with 2 lesions in the liver treated with TACE followed by liver transplant in 2019.  In June 2023 he developed new peritoneal metastases at the end of July started therapy with sorafenib at 400 mg twice per day.  This was complicated by diarrhea and severe nausea and vomiting that resulted in hospitalization with acute renal failure.  Upon recovery he was able to resume active treatment with just 200 mg twice per day and attempts to escalate his dosing resulted in too much GI toxicity and so he has been on 200 mg twice per day ever since.  He returns today for his next planned response assessment.    He reports that overall he feels like he is doing well.  He had symptoms of an upper respiratory infection last week but those seem to have resolved without ongoing sequelae.  He has some minor leg pain intermittently.  He is regularly walking several miles per day.  His appetite and weight have been stable.    On exam via the video link he appears older than his stated age but otherwise well.    I personally reviewed his CT scan went over the results with him.  That shows his very small peritoneal nodules to be stable to may be improved.  I do not see any new sites of disease.    His alpha-fetoprotein remains normal at 2.4.  His electrolytes are normal with a creatinine of 1.72, slightly worse than recent values.  His bilirubin, albumin and liver enzymes are normal.  He has improving normocytic anemia with a hemoglobin of 11.4 and otherwise normal blood  counts.    Assessment/plan: Metastatic hepatocellular carcinoma in a patient with a prior liver transplant with stable disease on reduced dose sorafenib.  I do not think we want to make any more attempts to try and escalate his dose and will continue on at the same dose and schedule.  His disease related anemia seems to be improving, though some of that improvement may simply be representative of some volume contraction.  His chronic renal failure is perhaps slightly worse and I encouraged him to maintain good oral intake.  His transplanted liver seems to be functioning well to continue on his current dosing of mycophenolate.  He seems to be doing well with his diabetes management with his last hemoglobin A1c being 5.7.  He will continue on with his anticoagulation for his prior DVT.    We will plan on seeing him back after another 2 to 3 months of ongoing treatment with sorafenib for another response assessment.

## 2024-02-01 NOTE — LETTER
2/1/2024         RE: Frandy Workman  [address] [facility] [address]        Dear Colleague,    Thank you for referring your patient, Frandy Workman, to the [facility]. Please see a copy of my visit note below.      I am seeing Miller Rollins today in follow-up of metastatic hepatocellular carcinoma.    He is turning 59 years old and was diagnosed 5 years ago with 2 lesions in the liver treated with TACE followed by liver transplant in 2019.  In June 2023 he developed new peritoneal metastases at the end of July started therapy with sorafenib at 400 mg twice per day.  This was complicated by diarrhea and severe nausea and vomiting that resulted in hospitalization with acute renal failure.  Upon recovery he was able to resume active treatment with just 200 mg twice per day and attempts to escalate his dosing resulted in too much GI toxicity and so he has been on 200 mg twice per day ever since.  He returns today for his next planned response assessment.    He reports that overall he feels like he is doing well.  He had symptoms of an upper respiratory infection last week but those seem to have resolved without ongoing sequelae.  He has some minor leg pain intermittently.  He is regularly walking several miles per day.  His appetite and weight have been stable.    On exam via the video link he appears older than his stated age but otherwise well.    I personally reviewed his CT scan went over the results with him.  That shows his very small peritoneal nodules to be stable to may be improved.  I do not see any new sites of disease.    His alpha-fetoprotein remains normal at 2.4.  His electrolytes are normal with a creatinine of 1.72, slightly worse than recent values.  His bilirubin, albumin and liver enzymes are normal.  He has improving normocytic anemia with a hemoglobin of 11.4 and otherwise normal blood counts.    Assessment/plan: Metastatic hepatocellular carcinoma in a  patient with a prior liver transplant with stable disease on reduced dose sorafenib.  I do not think we want to make any more attempts to try and escalate his dose and will continue on at the same dose and schedule.  His disease related anemia seems to be improving, though some of that improvement may simply be representative of some volume contraction.  His chronic renal failure is perhaps slightly worse and I encouraged him to maintain good oral intake.  His transplanted liver seems to be functioning well to continue on his current dosing of mycophenolate.  He seems to be doing well with his diabetes management with his last hemoglobin A1c being 5.7.  He will continue on with his anticoagulation for his prior DVT.    We will plan on seeing him back after another 2 to 3 months of ongoing treatment with sorafenib for another response assessment.      Again, thank you for allowing me to participate in the care of your patient.        Sincerely,        Miguel Villarreal MD

## 2024-02-06 ENCOUNTER — DOCUMENTATION ONLY (OUTPATIENT)
Dept: TRANSPLANT | Facility: CLINIC | Age: 59
End: 2024-02-06
Payer: MEDICARE

## 2024-02-06 DIAGNOSIS — N18.31 CHRONIC KIDNEY DISEASE, STAGE 3A (H): Primary | ICD-10-CM

## 2024-02-06 PROBLEM — C44.92 SCC (SQUAMOUS CELL CARCINOMA): Status: ACTIVE | Noted: 2023-02-15

## 2024-02-06 ASSESSMENT — ENCOUNTER SYMPTOMS: NEW SYMPTOMS OF CORONARY ARTERY DISEASE: 0

## 2024-02-06 NOTE — PROGRESS NOTES
Miller is struggling with diarrhea still. He is leaving to go to [place]. Requested he come in for repeat BMP to see if needs fluids before he leaves.    Encouraged patient to make lab appt

## 2024-02-07 ENCOUNTER — LAB (OUTPATIENT)
Dept: LAB | Facility: CLINIC | Age: 59
End: 2024-02-07
Payer: MEDICARE

## 2024-02-07 DIAGNOSIS — N18.31 CHRONIC KIDNEY DISEASE, STAGE 3A (H): ICD-10-CM

## 2024-02-07 LAB
ANION GAP SERPL CALCULATED.3IONS-SCNC: 10 MMOL/L (ref 7–15)
BUN SERPL-MCNC: 30.6 MG/DL (ref 8–23)
CALCIUM SERPL-MCNC: 9 MG/DL (ref 8.8–10.2)
CHLORIDE SERPL-SCNC: 109 MMOL/L (ref 98–107)
CREAT SERPL-MCNC: 1.36 MG/DL (ref 0.67–1.17)
DEPRECATED HCO3 PLAS-SCNC: 21 MMOL/L (ref 22–29)
EGFRCR SERPLBLD CKD-EPI 2021: 60 ML/MIN/1.73M2
GLUCOSE SERPL-MCNC: 158 MG/DL (ref 70–99)
POTASSIUM SERPL-SCNC: 5.4 MMOL/L (ref 3.4–5.3)
SODIUM SERPL-SCNC: 140 MMOL/L (ref 135–145)

## 2024-02-07 PROCEDURE — 36415 COLL VENOUS BLD VENIPUNCTURE: CPT | Performed by: PATHOLOGY

## 2024-02-07 PROCEDURE — 80048 BASIC METABOLIC PNL TOTAL CA: CPT | Performed by: PATHOLOGY

## 2024-02-08 ENCOUNTER — DOCUMENTATION ONLY (OUTPATIENT)
Dept: SLEEP MEDICINE | Facility: CLINIC | Age: 59
End: 2024-02-08
Payer: MEDICARE

## 2024-02-11 DIAGNOSIS — C78.6 MALIGNANT NEOPLASM METASTATIC TO PERITONEUM (H): ICD-10-CM

## 2024-02-11 DIAGNOSIS — C22.0 HCC (HEPATOCELLULAR CARCINOMA) (H): Primary | ICD-10-CM

## 2024-02-13 ENCOUNTER — VIRTUAL VISIT (OUTPATIENT)
Dept: BEHAVIORAL HEALTH | Facility: CLINIC | Age: 59
End: 2024-02-13
Payer: MEDICARE

## 2024-02-13 DIAGNOSIS — C22.0 HCC (HEPATOCELLULAR CARCINOMA) (H): Primary | ICD-10-CM

## 2024-02-13 DIAGNOSIS — F41.1 GENERALIZED ANXIETY DISORDER: ICD-10-CM

## 2024-02-13 DIAGNOSIS — C78.6 MALIGNANT NEOPLASM METASTATIC TO PERITONEUM (H): ICD-10-CM

## 2024-02-13 DIAGNOSIS — F31.31 BIPOLAR 1 DISORDER, DEPRESSED, MILD (H): Primary | ICD-10-CM

## 2024-02-13 PROCEDURE — 90834 PSYTX W PT 45 MINUTES: CPT | Mod: 95 | Performed by: PSYCHOLOGIST

## 2024-02-13 RX ORDER — SORAFENIB 200 MG/1
200 TABLET, FILM COATED ORAL 2 TIMES DAILY
Qty: 60 TABLET | Refills: 0 | Status: SHIPPED | OUTPATIENT
Start: 2024-02-13 | End: 2024-03-18

## 2024-02-13 NOTE — PROGRESS NOTES
[facility]: Integrated Behavioral Health  February 13, 2024            Behavioral Health Clinician Progress Note    Patient Name: Frandy Workman           Service Type: Video visit      Service Location:  Video visit      Session Start Time: 1:33 Session End Time: 2:20      Session Length: 38 - 52      Attendees: Patient    Visit Activities (Refresh list every visit): [facility] Only     Service Modality:  Video Visit:      Provider verified identity through the following two step process.  Patient provided:  Patient photo and Patient is known previously to provider    Telemedicine Visit: The patient's condition can be safely assessed and treated via synchronous audio and visual telemedicine encounter.      Reason for Telemedicine Visit: Services only offered telehealth    Originating Site (Patient Location): Patient's home    Distant Site (Provider Location): Provider Remote Setting- Home Office    Consent:  The patient/guardian has verbally consented to: the potential risks and benefits of telemedicine (video visit) versus in person care; bill my insurance or make self-payment for services provided; and responsibility for payment of non-covered services.     Patient would like the video invitation sent by:  My Chart    Mode of Communication:  Video Conference via [employer]    Distant Location (Provider):  Off-site    As the provider I attest to compliance with applicable laws and regulations related to telemedicine.      Diagnostic Assessment Date:  12/03/2020  Treatment Plan Review Date:  5/13/2024  See Flowsheets for today's PHQ-9 and LIZZETTE-7 results  Previous PHQ-9:       11/2/2023    11:01 AM 12/12/2023     2:55 PM 1/25/2024    10:23 AM   PHQ-9 SCORE   PHQ-9 Total Score MyChart 2 (Minimal depression) 2 (Minimal depression) 2 (Minimal depression)   PHQ-9 Total Score 2 2 2     Previous LIZZETTE-7:       4/7/2021    12:34 PM 9/30/2021     1:45 PM 5/17/2023     3:37 PM   LIZZETTE-7 SCORE   Total  Score 9 (mild anxiety)     Total Score 9 10 6      11/7/2017  5:23 AM 3/22/2022  1:49 PM 3/21/2023  1:43 PM 6/22/2023  9:09 AM   PROMIS-10 Scores Only       Global Mental Health Score 14  9  9  13    Global Physical Health Score 13  10  9  13    PROMIS TOTAL - SUBSCORES 27  19  18  26       9/14/2023  9:45 AM   PROMIS-10 Scores Only    Global Mental Health Score 15    Global Physical Health Score 12    PROMIS TOTAL - SUBSCORES 27            Extended Session (60+ minutes): No  Interactive Complexity: No  Crisis: No    Treatment Objective(s) Addressed in This Session:  Target Behavior(s): disease management/lifestyle changes   related to adaptive approaches to managing anxious distress and mood difficulties    Medication issue    Decrease negative self-talk    Help facilitate acceptance    Current Stressors / Issues:  [facility] met with Miller today for a follow-up, to help Miller continue to feel supported in his health behavior change and overall mental health.      Went to the [employer] and had a positive experience with his friend Art. 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 The proposal might ential Miller moving to [place] at least part time, though he is unsure he will accept the offer out of concern of his health and wanting to remain close [facility]. We also talked about my resignation from [employer] and briefly talked about next steps for Miller in promoting his MH care moving forward.     Progress on Treatment Objective(s) / Homework:  Stable - ACTION (Actively working towards change); Intervened by reinforcing change plan / affirming steps taken      Solution-Focused Therapy  Explore patterns in patient's relationships and discuss options for new behaviors.  Explore patterns in patient's actions and choices and discuss options for new behaviors.    Behavioral Activation  Discuss steps patient can take to become more involved in  meaningful activity.  Identify barriers to these activities and explore possible solutions.    Acceptance and Commitment Therapy  Explore and identify important values in patient's life.  Discuss ways to commit to behavioral activation around these values.      Answers for HPI/ROS submitted by the patient on 3/21/2022  If you checked off any problems, how difficult have these problems made it for you to do your work, take care of things at home, or get along with other people?: Not difficult at all  PHQ9 TOTAL SCORE: 6      Answers for HPI/ROS submitted by the patient on 2/8/2022  If you checked off any problems, how difficult have these problems made it for you to do your work, take care of things at home, or get along with other people?: Somewhat difficult  PHQ9 TOTAL SCORE: 4      Answers for HPI/ROS submitted by the patient on 4/7/2021   LIZZETTE 7 TOTAL SCORE: 9  If you checked off any problems, how difficult have these problems made it for you to do your work, take care of things at home, or get along with other people?: Very difficult  PHQ9 TOTAL SCORE: 7*    *No SI    Answers for HPI/ROS submitted by the patient on 10/13/2020   If you checked off any problems, how difficult have these problems made it for you to do your work, take care of things at home, or get along with other people?: Somewhat difficult  PHQ9 TOTAL SCORE: 8*  LIZZETTE 7 TOTAL SCORE: 6      *No SI     Answers for HPI/ROS submitted by the patient on 12/29/2020   If you checked off any problems, how difficult have these problems made it for you to do your work, take care of things at home, or get along with other people?: Somewhat difficult  PHQ9 TOTAL SCORE: 5*  LIZZETTE 7 TOTAL SCORE: 8    *No SI     Answers for HPI/ROS submitted by the patient on 11/21/2022  If you checked off any problems, how difficult have these problems made it for you to do your work, take care of things at home, or get along with other people?: Very difficult  PHQ9 TOTAL SCORE:  4          Care Plan review completed: no    Medication Review:  No changes to current psychiatric medication(s)     Reports discontinuing zolpidem.       Current Outpatient Medications   Medication    albuterol (PROAIR HFA/PROVENTIL HFA/VENTOLIN HFA) 108 (90 Base) MCG/ACT inhaler    Alcohol Swabs (ALCOHOL PREP) 70 % PADS    ammonium lactate (AMLACTIN) 12 % external cream    apixaban ANTICOAGULANT (ELIQUIS) 5 MG tablet    benzoyl peroxide 5 % external liquid    blood glucose (NO BRAND SPECIFIED) lancets standard    Continuous Blood Gluc Sensor (FREESTYLE CLAUDIA 2 SENSOR) MISC    empagliflozin (JARDIANCE) 10 MG TABS tablet    insulin aspart (NOVOLOG PEN) 100 UNIT/ML pen    insulin degludec (TRESIBA FLEXTOUCH) 100 UNIT/ML pen    insulin pen needle (ULTICARE MICRO) 32G X 4 MM miscellaneous    K-Phos-Neutral [id number] MG tablet    ketoconazole (NIZORAL) 2 % external shampoo    lamiVUDine (EPIVIR) 100 MG tablet    lisinopril (ZESTRIL) 5 MG tablet    loperamide (IMODIUM A-D) 2 MG tablet    magnesium oxide (MAG-OX) 400 MG tablet    metoprolol succinate ER (TOPROL XL) 25 MG 24 hr tablet    Multiple Vitamin (DAILY-GLADIS MULTIVITAMIN) TABS    mycophenolate (GENERIC EQUIVALENT) 250 MG capsule    NIFEdipine ER OSMOTIC (PROCARDIA XL) 60 MG 24 hr tablet    ondansetron (ZOFRAN ODT) 8 MG ODT tab    pantoprazole (PROTONIX) 40 MG EC tablet    predniSONE (DELTASONE) 5 MG tablet    rosuvastatin (CRESTOR) 20 MG tablet    sodium zirconium cyclosilicate (LOKELMA) 10 g PACK packet    SORAfenib (NEXAVAR) 200 MG tablet    tamsulosin (FLOMAX) 0.4 MG capsule    Vitamin D3 50 mcg (2000 units) tablet     Current Facility-Administered Medications   Medication    aflibercept (EYLEA) injection prefilled syringe 2 mg    aflibercept (EYLEA) injection prefilled syringe 2 mg    dexamethasone (OZURDEX) intravitreal implant 0.7 mg    dexamethasone (OZURDEX) intravitreal implant 0.7 mg    faricimab-svoa (VABYSMO) intravitreal injection 6 mg     faricimab-svoa (VABYSMO) intravitreal injection 6 mg    faricimab-svoa (VABYSMO) intravitreal injection 6 mg    lidocaine (PF) (XYLOCAINE) injection 1 mL       Medication Compliance:  Yes    Changes in Health Issues:   None reported    Chemical Use Review:   Substance Use: Chemical use reviewed, no active concerns identified      Tobacco Use: No current tobacco use.         Assessment: Current Emotional / Mental Status (status of significant symptoms):  Risk status (Self / Other harm or suicidal ideation)  Patient denies a history of suicidal ideation, suicide attempts, self-injurious behavior, homicidal ideation, homicidal behavior and and other safety concerns     Patient denies current fears or concerns for personal safety.  Patient denies current or recent suicidal ideation or behaviors.  Patient denies current or recent homicidal ideation or behaviors.  Patient denies current or recent self injurious behavior or ideation.  Patient denies other safety concerns.     A safety and risk management plan has not been developed at this time, however patient was encouraged to call [place] [phone] should there be a change in any of these risk factors.    [place] Suicide Severity Rating Scale (Short Version)      7/1/2020    11:00 AM 7/12/2021     2:30 PM 1/10/2022     9:28 PM 1/3/2023     6:31 AM 1/24/2023     6:22 AM 7/13/2023    10:31 AM 8/10/2023     3:31 PM   [place] Suicide Severity Rating (Short Version)   Over the past 2 weeks have you felt down, depressed, or hopeless? no no no no no no no   Over the past 2 weeks have you had thoughts of killing yourself? no no no no  no no   Have you ever attempted to kill yourself? no no no no  no no   Q1 Wished to be Dead (Past Month)    no      Q2 Suicidal Thoughts (Past Month)    no      Q3 Suicidal Thought Method    no      Q4 Suicidal Intent without Specific Plan    no      Q5 Suicide Intent with Specific Plan    no      Q6 Suicide Behavior (Lifetime)    no       Level of Risk per Screen    low risk            Appearance:   Appropriate   Eye Contact:   Good   Psychomotor Behavior: TD evident - improving  Attitude:   Cooperative  Interested Friendly Pleasant  Orientation:   All  Speech   Rate / Production: Normal/ Responsive   Volume:  Normal   Mood:    Depressed ; improving  Affect:    Appropriate    Thought Content:  Clear   Thought Form:  Goal Directed  Logical   Insight:    Good     Diagnoses:  1. Bipolar 1 disorder, depressed, mild (H)    2. Generalized anxiety disorder                  Collateral Reports Completed:  Not Applicable    Plan: (Homework, other):  Continue with this writer for individual psychotherapy in 2/3 wks. He will continue to work on managing depression and anxiety through achievable behavioral activation ideas.Information about grief management given today.  No CD issues.     Joseph Murillo Psy.D, LP   Behavioral Health Clinician   [facility]          ______________________________________________________________________                                            Individual Treatment Plan    Patient's Name: Frandy Workman  YOB: 1965    Date of Creation: 3/1/2022  Date Treatment Plan Last Reviewed/Revised: 2/13/2024    DSM5 Diagnoses: 296.51 Bipolar I Disorder Current or Most Recent Episode Depressed, Mild or 300.02 (F41.1) Generalized Anxiety Disorder  Psychosocial / Contextual Factors: Post Solid Organ Tx  PROMIS (reviewed every 90 days):     11/7/2017  5:23 AM 3/22/2022  1:49 PM 3/21/2023  1:43 PM 6/22/2023  9:09 AM   PROMIS-10 Scores Only       Global Mental Health Score 14  9  9  13    Global Physical Health Score 13  10  9  13    PROMIS TOTAL - SUBSCORES 27  19  18  26       9/14/2023  9:45 AM   PROMIS-10 Scores Only    Global Mental Health Score 15    Global Physical Health Score 12    PROMIS TOTAL - SUBSCORES 27          Referral / Collaboration:  Referral to another professional/service is  not indicated at this time..    Anticipated number of session for this episode of care: 4-6  Anticipation frequency of session: Every other week  Anticipated Duration of each session: 38-52 minutes  Treatment plan will be reviewed in 90 days or when goals have been changed.       MeasurableTreatment Goal(s) related to diagnosis / functional impairment(s)  Goal 1: Patient will experience a reduction in depressive symptoms along with a corresponding increase in positive emotion and life satisfaction.      Objective #A (Patient Action)    Patient will Increase interest, engagement, and pleasure in doing things.  Status: Continued - Date(s): 2/13/2024    Intervention(s)  Therapist will help patient identify pleasant and mastery oriented events that elicit positive, relaxed mood.    Objective #B  Patient will Decrease frequency and intensity of feeling down, depressed, hopeless.  Status: Continued - Date(s): 2/13/2024    Intervention(s)  Therapist will introduce patient to cognitive-behavioral and acceptance and commitment therapy topics aimed to help reduce depression and anxiety    Objective #C  Patient will Identify negative self-talk and behaviors: challenge core beliefs, myths, and actions  Improve concentration, focus, and mindfulness in daily activities .  Status: Continued - Date(s): COMPLETE    Intervention(s)  Therapist will help patient identify and manage negative self-talk and automatic thoughts; introduce patient to cognitive distortions; help patient develop cognitive diffusion techniques      Goal 2: Patient will experience a reduction in anxious symptoms, along with a corresponding increase in relaxed emotional states and life satisfaction.      Objective #A (Patient Action)  Patient will use cognitive-behavioral and thought diffusion strategies identified in therapy to challenge anxious thoughts.    Status: Continued - Date(s): COMPLETE    Intervention(s)  Therapist will utilize CBT and ACT ideas to  "help patient challenge anxious thoughts and reduce intensity/duration of anxious distress    Objective #B  Patient will use \"worry time\" each day for 15 minutes of scheduled worry and then defer obsessive or anxious thinking until the next structured worry time.    Status: Continued - Date(s): COMPLETE    Intervention(s)  Therapist will teach patient how to effectively utilize worry time and/or thought logs/journals each day and incorporate more relaxation behaviors into their routine.    Objective #C  Patient will identify the stressors which contribute to feelings of anxiety  Patient will increase engagement in adaptive coping skills and recreational activities, such as exercise and healthy socialization, to manage distress.  Status: Continued - Date(s): COMPLETE    Intervention(s)  Therapist will help patient identify triggers/situational factors that contribute to anxiety and behavioral skills aimed to manage anxious distress.      Goal 3: Patiient will work toward adaptively managing bipolar-related depression and manic episodes      Objective #A (Patient Action)    Status: Continued - Date(s): COMPLETE    Patient will identify 2-3 signs or signals of emerging mood instability.    Intervention(s)  Therapist will provide educational materials on bipolar disorder and help identifying triggers for mood instability .    Objective #B  Patient will identify 2-3 strategies for more effectively managing Bipolar Disorder.    Status: Continued - Date(s): COMPLETE    Intervention(s)  Therapist will teach emotional recognition/identification. Skills aimed to effectively manage bipolar disorder .      Other Possible Therapeutic Intervention(s):    Psycho-education regarding mental health diagnoses and treatment options    Supportive Therapy  Provide affirmations, reflections, and establish working rapport  Emphasize and reflect on strength of therapeutic relationship    Skills training  Explore skills useful to client in " current situation.  Skills include assertiveness, communication, conflict management, problem-solving, relaxation, etc.    Solution-Focused Therapy  Explore patterns in patient's relationships and discuss options for new behaviors.  Explore patterns in patient's actions and choices and discuss options for new behaviors.    Cognitive-behavioral Therapy  Discuss common cognitive distortions, identify them in patient's life.  Explore ways to challenge, replace, and act against these cognitions.    Acceptance and Commitment Therapy  Explore and identify important values in patient's life.  Discuss ways to commit to behavioral activation around these values.    Psychodynamic psychotherapy  Discuss patient's emotional dynamics and issues and how they impact behaviors.  Explore patient's history of relationships and how they impact present behaviors.  Explore how to work with and make changes in these schemas and patterns.    Narrative Therapy  Explore the patient's story of his/her life from his/her perspective.  Explore alternate ways of understanding their experience, identifying exceptions, developing new themes.    Interpersonal Psychotherapy  Explore patterns in relationships that are effective or ineffective at helping patient reach their goals, find satisfying experience.  Discuss new patterns or behaviors to engage in for improved social functioning.    Behavioral Activation  Discuss steps patient can take to become more involved in meaningful activity.  Identify barriers to these activities and explore possible solutions.    Mindfulness-Based Strategies  Discuss skills based on development and application of mindfulness.  Skills drawn from compassion-focused therapy, dialectical behavior therapy, mindfulness-based stress reduction, mindfulness-based cognitive therapy, etc.      Patient has reviewed and agreed to the above plan.      Joseph Murillo Psy.D, LP   Behavioral Health Clinician   -[facility]     2/13/2024

## 2024-02-19 ENCOUNTER — TELEPHONE (OUTPATIENT)
Dept: CARDIOLOGY | Facility: CLINIC | Age: 59
End: 2024-02-19
Payer: MEDICARE

## 2024-02-19 ENCOUNTER — MYC MEDICAL ADVICE (OUTPATIENT)
Dept: ONCOLOGY | Facility: CLINIC | Age: 59
End: 2024-02-19
Payer: MEDICARE

## 2024-02-19 DIAGNOSIS — C78.6 MALIGNANT NEOPLASM METASTATIC TO PERITONEUM (H): ICD-10-CM

## 2024-02-19 DIAGNOSIS — I82.452 ACUTE DEEP VEIN THROMBOSIS (DVT) OF LEFT PERONEAL VEIN (H): ICD-10-CM

## 2024-02-19 DIAGNOSIS — E83.42 HYPOMAGNESEMIA: ICD-10-CM

## 2024-02-19 DIAGNOSIS — C22.0 HCC (HEPATOCELLULAR CARCINOMA) (H): ICD-10-CM

## 2024-02-19 DIAGNOSIS — Z94.4 STATUS POST LIVER TRANSPLANTATION (H): ICD-10-CM

## 2024-02-19 RX ORDER — MYCOPHENOLATE MOFETIL 250 MG/1
CAPSULE ORAL
Qty: 120 CAPSULE | Refills: 11 | Status: SHIPPED | OUTPATIENT
Start: 2024-02-19

## 2024-02-19 NOTE — TELEPHONE ENCOUNTER
Left Voicemail (1st Attempt) for the patient to call back and schedule the following:    Appointment type: return cardiology   Provider: diana  Return date: 05/02/24  Specialty phone number: [phone] opt 1  Additional appointment(s) needed: n/a   Additonal Notes:pt need to be scheduled with dee, or richie or stephen

## 2024-02-19 NOTE — TELEPHONE ENCOUNTER
Oncology Nurse Triage - Diarrhea  Situation:   Frandy reporting Diarrhea    Called patient to follow up on symptoms sent through a [employer]RAIN message on 2/19/24. Left a voicemail message requesting the patient call [phone], option 5, option 2 and speak with any Triage nurse available.

## 2024-02-19 NOTE — CONFIDENTIAL NOTE
Oncology Nurse Triage - Reporting Symptoms    Situation:   Frandy reporting the following symptoms: diarrhea       Background:   Treating Provider:   Dr Villarreal     Date of last office visit: 2/1/24 Dr Villarreal     Recent treatments: Yes: Sorafenib       Assessment  Onset of symptoms:   Diarrhea having 1-2 stools per day.   This morning it was all liquid then taking 2 tabs of imodium after first loose stool of day.   Has had in last week 2 firms stools otherwise all watery.   No cramping or diarrhea with loose stools.   Fluid drinking 200 ounces per day since having diarrhea.   Eating normally   Has increased renny to 165.      Advised to let us know if he is having 6 loose stools per day all of a sudden.

## 2024-02-19 NOTE — CONFIDENTIAL NOTE
Last prescribing provider: Steve    Last clinic visit date: 2/1/24    Recommendations for requested medication (if none, N/A): NA    Any other pertinent information (if none, N/A): NA    Refilled: Y/N, if NO, why?

## 2024-02-20 DIAGNOSIS — Z95.1 S/P CABG (CORONARY ARTERY BYPASS GRAFT): ICD-10-CM

## 2024-02-20 DIAGNOSIS — E11.3593 PROLIFERATIVE DIABETIC RETINOPATHY OF BOTH EYES ASSOCIATED WITH TYPE 2 DIABETES MELLITUS, UNSPECIFIED PROLIFERATIVE RETINOPATHY TYPE (H): Primary | ICD-10-CM

## 2024-02-20 RX ORDER — LOPERAMIDE HCL 2 MG
CAPSULE ORAL
Qty: 120 CAPSULE | Refills: 2 | Status: SHIPPED | OUTPATIENT
Start: 2024-02-20 | End: 2024-08-07

## 2024-02-20 RX ORDER — APIXABAN 5 MG/1
5 TABLET, FILM COATED ORAL 2 TIMES DAILY
Qty: 60 TABLET | Refills: 3 | Status: SHIPPED | OUTPATIENT
Start: 2024-02-20 | End: 2024-03-28

## 2024-02-21 DIAGNOSIS — E83.42 HYPOMAGNESEMIA: ICD-10-CM

## 2024-02-21 RX ORDER — MAGNESIUM OXIDE 400 MG/1
400 TABLET ORAL DAILY
Qty: 30 TABLET | Refills: 0 | OUTPATIENT
Start: 2024-02-21

## 2024-02-21 RX ORDER — ACETAMINOPHEN 325 MG/1
TABLET ORAL
Qty: 100 TABLET | Refills: 3 | Status: SHIPPED | OUTPATIENT
Start: 2024-02-21

## 2024-02-21 RX ORDER — MAGNESIUM OXIDE 400 MG/1
400 TABLET ORAL DAILY
Qty: 30 TABLET | Refills: 1 | Status: SHIPPED | OUTPATIENT
Start: 2024-02-21 | End: 2024-03-28

## 2024-02-21 RX ORDER — SORAFENIB 200 MG/1
TABLET, FILM COATED ORAL
Qty: 60 TABLET | Refills: 0 | OUTPATIENT
Start: 2024-02-21

## 2024-02-22 ENCOUNTER — TELEPHONE (OUTPATIENT)
Dept: DERMATOLOGY | Facility: CLINIC | Age: 59
End: 2024-02-22
Payer: MEDICARE

## 2024-02-22 NOTE — TELEPHONE ENCOUNTER
Please call us to reschedule follow up with Dr. Cespedes at the dermatology clinic [phone].

## 2024-02-26 ENCOUNTER — OFFICE VISIT (OUTPATIENT)
Dept: ORTHOPEDICS | Facility: CLINIC | Age: 59
End: 2024-02-26
Payer: MEDICARE

## 2024-02-26 DIAGNOSIS — E11.51 DIABETES MELLITUS WITH PERIPHERAL VASCULAR DISEASE (H): ICD-10-CM

## 2024-02-26 DIAGNOSIS — L60.2 ONYCHAUXIS: ICD-10-CM

## 2024-02-26 DIAGNOSIS — E11.49 TYPE II OR UNSPECIFIED TYPE DIABETES MELLITUS WITH NEUROLOGICAL MANIFESTATIONS, NOT STATED AS UNCONTROLLED(250.60) (H): Primary | ICD-10-CM

## 2024-02-26 DIAGNOSIS — S90.821S BLISTER OF RIGHT FOOT, SEQUELA: ICD-10-CM

## 2024-02-26 PROCEDURE — 99213 OFFICE O/P EST LOW 20 MIN: CPT | Performed by: PODIATRIST

## 2024-02-26 NOTE — PROGRESS NOTES
Past Medical History:   Diagnosis Date    Anemia 2013    Arthritis     BPH (benign prostatic hyperplasia)     CAD (coronary artery disease) 04/01/2019    Cholelithiasis     Conductive hearing loss 08/16/2017    Depressive disorder 1986    Suffer effects throughout life    Gastroesophageal reflux disease 12/01/2014    HCC (hepatocellular carcinoma) (H) 01/22/2019    History of blood transfusion 2019    Had blood transfussion until Dec 2022    History of diabetic retinopathy 07/2018    HTN (hypertension) 11/20/2019    Hyperlipidemia     Liver cirrhosis secondary to ESTRADA (H)     Liver transplanted (H) 11/11/2019    Portal vein thrombosis 04/11/2019    SCC (squamous cell carcinoma) 02/15/2023    02/15/23:  Left temporal scalp    Type II diabetes mellitus (H)      Patient Active Problem List   Diagnosis    Bipolar affective disorder in remission (H24)    Esophageal varices determined by endoscopy (H)    Erectile dysfunction due to diseases classified elsewhere    HCC (hepatocellular carcinoma) (H)    Equivocal stress echocardiogram    Type 2 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, unspecified whether long term insulin use (H)    Status post coronary angiogram    CAD (coronary artery disease)    Liver transplant recipient (H)    Status post liver transplantation (H)    Immunosuppressed status (H24)    Benign essential hypertension    Anemia of chronic renal failure, stage 3a (H)    History of coronary artery disease    Dyslipidemia    Excessive sweating    Stage 3b chronic kidney disease (H)    Anemia of chronic renal failure, stage 3b (H)    NSTEMI (non-ST elevated myocardial infarction) (H)    Chest pain, unspecified type    Hypertensive chronic kidney disease with stage 5 chronic kidney disease or end stage renal disease (H)    Vitreous hemorrhage of left eye (H)    Proliferative diabetic retinopathy of both eyes associated with type 2 diabetes mellitus, unspecified  proliferative retinopathy type (H)    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)    Liver replaced by transplant (H)    Hyperkalemia    Acute renal failure, unspecified acute renal failure type (H24)    ARIELA (obstructive sleep apnea)    History of nonmelanoma skin cancer    Neoplasm of unspecified behavior of bone, soft tissue, and skin    SCC (squamous cell carcinoma)     Past Surgical History:   Procedure Laterality Date    ABDOMEN SURGERY  11/11/2019    Had Liver Transplant    BIOPSY  12/2022    Had biopsy done will have additional procedure 2/15/2023    BYPASS GRAFT ARTERY CORONARY N/A 07/14/2021    Procedure: median sternotomy, on cardiopulmonary bypass, CORONARY ARTERY BYPASS GRAFT (CABG) x2 with left greater saphenous vein endoscopic harvest and left internal mammery artery harvest;  Surgeon: Tom Zapata MD;  Location: [facility] OR    CARDIAC SURGERY  7/2021    Heart Graph. and bypass surgery    CHOLECYSTECTOMY  11/11/2022    Removed with Liver Transplant    COLONOSCOPY      2015    COLONOSCOPY N/A 12/06/2019    Procedure: COLONOSCOPY, WITH POLYPECTOMY AND BIOPSY;  Surgeon: Adam Morton MD;  Location:  GI    CV CENTRAL VENOUS CATHETER PLACEMENT N/A 07/12/2021    Procedure: Central Venous Catheter Placement;  Surgeon: Fermin Polanco MD;  Location: [facility] CARDIAC CATH LAB    CV CORONARY ANGIOGRAM N/A 07/12/2021    Procedure: Coronary Angiogram;  Surgeon: Fermin Polanco MD;  Location: [facility] CARDIAC CATH LAB    CV HEART CATHETERIZATION WITH POSSIBLE INTERVENTION N/A 02/26/2019    Procedure: CORS;  Surgeon: Jagdish Hoyt MD;  Location: [facility] CARDIAC CATH LAB    CV INTRA AORTIC BALLOON N/A 07/12/2021    Procedure: Intra Aortic Balloon Pump Insertion;  Surgeon: Fermin Polanco MD;  Location: [facility] CARDIAC CATH LAB    ESOPHAGOSCOPY, GASTROSCOPY, DUODENOSCOPY (EGD), COMBINED N/A 11/17/2016    Procedure: COMBINED ESOPHAGOSCOPY,  GASTROSCOPY, DUODENOSCOPY (EGD);  Surgeon: Santi Rosas MD;  Location: [facility] GI    ESOPHAGOSCOPY, GASTROSCOPY, DUODENOSCOPY (EGD), COMBINED N/A 11/17/2017    Procedure: COMBINED ESOPHAGOSCOPY, GASTROSCOPY, DUODENOSCOPY (EGD);  EGD;  Surgeon: Santi Rosas MD;  Location: [facility] GI    ESOPHAGOSCOPY, GASTROSCOPY, DUODENOSCOPY (EGD), COMBINED N/A 12/28/2018    Procedure: EGD;  Surgeon: Santi Rosas MD;  [facility]: [facility] OR    ESOPHAGOSCOPY, GASTROSCOPY, DUODENOSCOPY (EGD), COMBINED N/A 12/06/2019    Procedure: ESOPHAGOGASTRODUODENOSCOPY, WITH BIOPSY;  Surgeon: Adam Morton MD;  Location: [facility] GI    ESOPHAGOSCOPY, GASTROSCOPY, DUODENOSCOPY (EGD), COMBINED N/A 02/13/2020    Procedure: ESOPHAGOGASTRODUODENOSCOPY (EGD);  Surgeon: Santi Rosas MD;  Location: [facility] GI    EXCISE MASS ABDOMEN N/A 07/13/2023    Procedure: Laparoscopic lysis of adhesions, laparoscopic resection of abdominal mass;  Surgeon: Ruiz Chu MD;  Location:  OR    HEAD & NECK SURGERY      12/2017 at [facility].     IMPLANT GOLD WEIGHT EYELID Right 11/16/2017    Procedure: IMPLANT WEIGHT EYELID;  Right Upper Eyelid Weight, right tarsal strip lower eyelid;  Surgeon: Milana Malave MD;  [facility]    IR CHEMO EMBOLIZATION  01/22/2019    KNEE SURGERY Left     ORTHOPEDIC SURGERY      PAROTIDECTOMY, RADICAL NECK DISSECTION Right 11/02/2017    Procedure: PAROTIDECTOMY, RADICAL NECK DISSECTION;  Right Superfacial Parotidectomy , Facial nerve repair. with [facility] [place] facial nerve monitor.;  Surgeon: Asiya Morgan MD;  Location: [facility] OR    PHACOEMULSIFICATION CLEAR CORNEA WITH STANDARD INTRAOCULAR LENS IMPLANT Right 01/24/2023    Procedure: PHACOEMULSIFICATION, COMPLEX CATARACT, WITH INTRAOCULAR LENS IMPLANT WITH TRYPAN RIGHT EYE;  Surgeon: Enriqueta Martin MD;  Location: [facility] OR    PHACOEMULSIFICATION WITH STANDARD INTRAOCULAR LENS IMPLANT Left 01/03/2023    Procedure: PHACOEMULSIFICATION, COMPLEX CATARACT, WITH  STANDARD INTRAOCULAR LENS IMPLANT INSERTION LEFT EYE;  Surgeon: Enriqueta Martin MD;  Location: [facility] OR    PICC INSERTION Left 2017    4fr SL BioFlo PICC, 44cm in the L basilic vein w/ tip in the low SVC    RETURN LIVER TRANSPLANT N/A 2019    Procedure: Exploratory laparotomy, hematoma evacuation, abdominal washout;  Surgeon: Александр Ramos MD;  Location: [facility] OR    TRANSPLANT LIVER RECIPIENT  DONOR N/A 2019    Procedure: TRANSPLANT, LIVER, RECIPIENT,  DONOR;  Surgeon: Александр Ramos MD;  Location: [facility] OR    VASCULAR SURGERY       Social History     Socioeconomic History    Marital status:      Spouse name: Not on file    Number of children: Not on file    Years of education: Not on file    Highest education level: Bachelor's degree (e.g., BA, AB, BS)   Occupational History    Not on file   Tobacco Use    Smoking status: Former     Types: Dip, chew, snus or snuff     Passive exposure: Past    Smokeless tobacco: Former     Types: Chew     Quit date: 10/31/2017    Tobacco comments:     Quit [place] after Doctor told me in 2017 that if I didn't I would   Vaping Use    Vaping Use: Never used   Substance and Sexual Activity    Alcohol use: No     Comment: quit 1996    Drug use: No    Sexual activity: Not Currently     Partners: Female     Birth control/protection: Condom   Other Topics Concern    Parent/sibling w/ CABG, MI or angioplasty before 65F 55M? No   Social History Narrative    Prior , [facility]     Social Determinants of Health     Financial Resource Strain: Low Risk  (2024)    Financial Resource Strain     Within the past 12 months, have you or your family members you live with been unable to get utilities (heat, electricity) when it was really needed?: No   Food Insecurity: Low Risk  (2024)    Food Insecurity     Within the past 12 months, did you worry that your food would run out before you got money to buy more?: No     Within  the past 12 months, did the food you bought just not last and you didn t have money to get more?: No   Transportation Needs: Low Risk  (2024)    Transportation Needs     Within the past 12 months, has lack of transportation kept you from medical appointments, getting your medicines, non-medical meetings or appointments, work, or from getting things that you need?: No   Physical Activity: Insufficiently Active (10/13/2020)    Exercise Vital Sign     Days of Exercise per Week: 1 day     Minutes of Exercise per Session: 60 min   Stress: Stress Concern Present (10/13/2020)    [employer] [place] [employer] - Occupational Stress Questionnaire     Feeling of Stress : To some extent   Social Connections: Socially Isolated (10/13/2020)    Social Connection and Isolation Panel [NHANES]     Frequency of Communication with Friends and Family: Once a week     Frequency of Social Gatherings with Friends and Family: Once a week     Attends Alevism Services: Never     Active Member of Clubs or Organizations: No     Attends Club or Organization Meetings: Never     Marital Status:    Interpersonal Safety: Low Risk  (10/31/2023)    Interpersonal Safety     Do you feel physically and emotionally safe where you currently live?: Yes     Within the past 12 months, have you been hit, slapped, kicked or otherwise physically hurt by someone?: No     Within the past 12 months, have you been humiliated or emotionally abused in other ways by your partner or ex-partner?: No   Housing Stability: Low Risk  (2024)    Housing Stability     Do you have housing? : Yes     Are you worried about losing your housing?: No     Family History   Problem Relation Age of Onset    Skin Cancer Mother     Cancer Mother         mastectomy    Diabetes Mother          3/2016    Cerebrovascular Disease Mother         Passed away in Feb of this year, 80 years old.    Thyroid Disease Mother     Depression Mother     Breast Cancer  "Mother     Obesity Mother         280 pounds  from this and complications from D    Pancreatic Cancer Father 60    Prostate Cancer Father         Currently in Remission    Macular Degeneration Father     Glaucoma Father     Skin Cancer Father     Coronary Artery Disease Father         Started in his 70's  at 88 in Octobe2023    Colon Cancer Father     Thyroid Disease Sister     Depression Sister     Asthma Sister     Sleep Apnea Brother     Colorectal Cancer Maternal Grandmother     Cancer Maternal Grandmother     Substance Abuse Maternal Grandmother         Alcohol    Prostate Cancer Maternal Grandfather     Substance Abuse Maternal Grandfather         Alcohol    Colorectal Cancer Paternal Grandmother     Asthma Sister         Had since birth    Liver Disease No family hx of     Melanoma No family hx of     Anesthesia Reaction No family hx of     Deep Vein Thrombosis (DVT) No family hx of        Lab Results   Component Value Date    A1C 5.7 2023    A1C 6.3 2023    A1C 6.9 2022    A1C 6.0 01/10/2022    A1C 6.8 2021    A1C 6.0 2020    A1C 6.3 2020    A1C 6.6 2018    A1C 6.5 2017    A1C 7.8 10/25/2016     Last Comprehensive Metabolic Panel:  Lab Results   Component Value Date     2024    POTASSIUM 5.4 (H) 2024    CHLORIDE 109 (H) 2024    CO2 21 (L) 2024    ANIONGAP 10 2024     (H) 2024    BUN 30.6 (H) 2024    CR 1.36 (H) 2024    GFRESTIMATED 60 (L) 2024    DEBORAH 9.0 2024     Lab Results   Component Value Date    AST 21 2024    AST 22 2024    AST 9 2021     Lab Results   Component Value Date    ALT 22 2024    ALT 23 2024    ALT 20 2021     No results found for: \"BILICONJ\"   Lab Results   Component Value Date    BILITOTAL 0.4 2024    BILITOTAL 0.4 2024    BILITOTAL 0.5 2021     Lab Results   Component Value Date    ALBUMIN 4.5 2024    " "ALBUMIN 4.6 01/30/2024    ALBUMIN 4.3 07/20/2022    ALBUMIN 4.0 06/28/2021     Lab Results   Component Value Date    PROTTOTAL 7.1 01/30/2024    PROTTOTAL 7.3 01/30/2024    PROTTOTAL 7.4 06/28/2021      Lab Results   Component Value Date    ALKPHOS 91 01/30/2024    ALKPHOS 94 01/30/2024    ALKPHOS 98 06/28/2021     Lab Results   Component Value Date    WBC 6.6 01/30/2024    WBC 4.4 06/28/2021     Lab Results   Component Value Date    RBC 3.69 01/30/2024    RBC 2.35 06/28/2021     Lab Results   Component Value Date    HGB 11.4 01/30/2024    HGB 7.3 06/28/2021     Lab Results   Component Value Date    HCT 36.2 01/30/2024    HCT 22.6 06/28/2021     No components found for: \"MCT\"  Lab Results   Component Value Date    MCV 98 01/30/2024    MCV 96 06/28/2021     Lab Results   Component Value Date    MCH 30.9 01/30/2024    MCH 31.1 06/28/2021     Lab Results   Component Value Date    MCHC 31.5 01/30/2024    MCHC 32.3 06/28/2021     Lab Results   Component Value Date    RDW 14.7 01/30/2024    RDW 15.1 06/28/2021     Lab Results   Component Value Date     01/30/2024     06/28/2021       Subjective findings- 59-year-old returns to clinic for blisters right plantar fourth MPJ and Diabetic foot cares.  Relates he is doing well.  Relates to no new problems, relates his diabetic shoes are doing well, relates he could use his toenails cut, relates he has AmLactin cream but has not been using it.    Objective findings- DP and PT are 2 out of 4 bilaterally.  Has incurvated nails with some thickening dystrophy subungual debris and discoloration to differing degrees bilaterally.  Has mild scaly skin on the fifth MPJs bilaterally.  Has diffuse hyperkeratotic tissue buildup plantar MPJs bilaterally.  There is no erythema, no drainage, no odor, no calor, no pain on palpation bilaterally.    Assessment and plan- Onychauxis and Onychomycosis bilaterally, Diabetes with peripheral Neuropathy and foot deformity and callus, " blister right plantar fourth MPJ.  Blisters are resolved.  Diagnosis and treatment options discussed with the patient.  All the toenails were debrided reduced bilaterally upon consent.  Advised him on AmLactin cream use.  Return to clinic and see me in 2 months.  Previous notes reviewed.                        Low to moderate level of medical decision making.

## 2024-02-26 NOTE — LETTER
2/26/2024         RE: Frandy Workman  [address] [facility] [address]        Dear Colleague,    Thank you for referring your patient, Frandy Workman, to the [facility] [place]. Please see a copy of my visit note below.    Past Medical History:   Diagnosis Date    Anemia 2013    Arthritis     BPH (benign prostatic hyperplasia)     CAD (coronary artery disease) 04/01/2019    Cholelithiasis     Conductive hearing loss 08/16/2017    Depressive disorder 1986    Suffer effects throughout life    Gastroesophageal reflux disease 12/01/2014    HCC (hepatocellular carcinoma) (H) 01/22/2019    History of blood transfusion 2019    Had blood transfussion until Dec 2022    History of diabetic retinopathy 07/2018    HTN (hypertension) 11/20/2019    Hyperlipidemia     Liver cirrhosis secondary to ESTRADA (H)     Liver transplanted (H) 11/11/2019    Portal vein thrombosis 04/11/2019    SCC (squamous cell carcinoma) 02/15/2023    02/15/23:  Left temporal scalp    Type II diabetes mellitus (H)      Patient Active Problem List   Diagnosis    Bipolar affective disorder in remission (H24)    Esophageal varices determined by endoscopy (H)    Erectile dysfunction due to diseases classified elsewhere    HCC (hepatocellular carcinoma) (H)    Equivocal stress echocardiogram    Type 2 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, unspecified whether long term insulin use (H)    Status post coronary angiogram    CAD (coronary artery disease)    Liver transplant recipient (H)    Status post liver transplantation (H)    Immunosuppressed status (H24)    Benign essential hypertension    Anemia of chronic renal failure, stage 3a (H)    History of coronary artery disease    Dyslipidemia    Excessive sweating    Stage 3b chronic kidney disease (H)    Anemia of chronic renal failure, stage 3b (H)    NSTEMI (non-ST elevated myocardial infarction) (H)    Chest pain,  unspecified type    Hypertensive chronic kidney disease with stage 5 chronic kidney disease or end stage renal disease (H)    Vitreous hemorrhage of left eye (H)    Proliferative diabetic retinopathy of both eyes associated with type 2 diabetes mellitus, unspecified proliferative retinopathy type (H)    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)    Liver replaced by transplant (H)    Hyperkalemia    Acute renal failure, unspecified acute renal failure type (H24)    ARIELA (obstructive sleep apnea)    History of nonmelanoma skin cancer    Neoplasm of unspecified behavior of bone, soft tissue, and skin    SCC (squamous cell carcinoma)     Past Surgical History:   Procedure Laterality Date    ABDOMEN SURGERY  11/11/2019    Had Liver Transplant    BIOPSY  12/2022    Had biopsy done will have additional procedure 2/15/2023    BYPASS GRAFT ARTERY CORONARY N/A 07/14/2021    Procedure: median sternotomy, on cardiopulmonary bypass, CORONARY ARTERY BYPASS GRAFT (CABG) x2 with left greater saphenous vein endoscopic harvest and left internal mammery artery harvest;  Surgeon: Tom aZpata MD;  Location: [facility] OR    CARDIAC SURGERY  7/2021    Heart Graph. and bypass surgery    CHOLECYSTECTOMY  11/11/2022    Removed with Liver Transplant    COLONOSCOPY      2015    COLONOSCOPY N/A 12/06/2019    Procedure: COLONOSCOPY, WITH POLYPECTOMY AND BIOPSY;  Surgeon: Adam Morton MD;  Location:  GI    CV CENTRAL VENOUS CATHETER PLACEMENT N/A 07/12/2021    Procedure: Central Venous Catheter Placement;  Surgeon: Fremin Polanco MD;  Location: [facility] CARDIAC CATH LAB    CV CORONARY ANGIOGRAM N/A 07/12/2021    Procedure: Coronary Angiogram;  Surgeon: Fermin Polanco MD;  Location: [facility] CARDIAC CATH LAB    CV HEART CATHETERIZATION WITH POSSIBLE INTERVENTION N/A 02/26/2019    Procedure: CORS;  Surgeon: Jagdish Hoyt MD;  Location: [facility] CARDIAC CATH LAB    CV INTRA AORTIC BALLOON N/A  07/12/2021    Procedure: Intra Aortic Balloon Pump Insertion;  Surgeon: Fermin Polanco MD;  Location:  HEART CARDIAC CATH LAB    ESOPHAGOSCOPY, GASTROSCOPY, DUODENOSCOPY (EGD), COMBINED N/A 11/17/2016    Procedure: COMBINED ESOPHAGOSCOPY, GASTROSCOPY, DUODENOSCOPY (EGD);  Surgeon: Santi Rosas MD;  Location:  GI    ESOPHAGOSCOPY, GASTROSCOPY, DUODENOSCOPY (EGD), COMBINED N/A 11/17/2017    Procedure: COMBINED ESOPHAGOSCOPY, GASTROSCOPY, DUODENOSCOPY (EGD);  EGD;  Surgeon: Santi Rosas MD;  Location:  GI    ESOPHAGOSCOPY, GASTROSCOPY, DUODENOSCOPY (EGD), COMBINED N/A 12/28/2018    Procedure: EGD;  Surgeon: Santi Rosas MD;  Location:  OR    ESOPHAGOSCOPY, GASTROSCOPY, DUODENOSCOPY (EGD), COMBINED N/A 12/06/2019    Procedure: ESOPHAGOGASTRODUODENOSCOPY, WITH BIOPSY;  Surgeon: Adam Morton MD;  Location:  GI    ESOPHAGOSCOPY, GASTROSCOPY, DUODENOSCOPY (EGD), COMBINED N/A 02/13/2020    Procedure: ESOPHAGOGASTRODUODENOSCOPY (EGD);  Surgeon: Santi Rosas MD;  Location:  GI    EXCISE MASS ABDOMEN N/A 07/13/2023    Procedure: Laparoscopic lysis of adhesions, laparoscopic resection of abdominal mass;  Surgeon: Ruiz Chu MD;  Location:  OR    HEAD & NECK SURGERY      12/2017 at [facility].     IMPLANT GOLD WEIGHT EYELID Right 11/16/2017    Procedure: IMPLANT WEIGHT EYELID;  Right Upper Eyelid Weight, right tarsal strip lower eyelid;  Surgeon: Milana Malave MD;  Location:  OR    IR CHEMO EMBOLIZATION  01/22/2019    KNEE SURGERY Left     ORTHOPEDIC SURGERY      PAROTIDECTOMY, RADICAL NECK DISSECTION Right 11/02/2017    Procedure: PAROTIDECTOMY, RADICAL NECK DISSECTION;  Right Superfacial Parotidectomy , Facial nerve repair. with [facility] facial nerve monitor.;  Surgeon: Asiya Morgan MD;  Location:  OR    PHACOEMULSIFICATION CLEAR CORNEA WITH STANDARD INTRAOCULAR LENS IMPLANT Right 01/24/2023    Procedure: PHACOEMULSIFICATION, COMPLEX  CATARACT, WITH INTRAOCULAR LENS IMPLANT WITH TRYPAN RIGHT EYE;  Surgeon: Enriqueta Martin MD;  Location: [facility] OR    PHACOEMULSIFICATION WITH STANDARD INTRAOCULAR LENS IMPLANT Left 2023    Procedure: PHACOEMULSIFICATION, COMPLEX CATARACT, WITH STANDARD INTRAOCULAR LENS IMPLANT INSERTION LEFT EYE;  Surgeon: Enriqueta Martin MD;  Location: [facility] OR    PICC INSERTION Left 2017    4fr SL BioFlo PICC, 44cm in the L basilic vein w/ tip in the low SVC    RETURN LIVER TRANSPLANT N/A 2019    Procedure: Exploratory laparotomy, hematoma evacuation, abdominal washout;  Surgeon: Александр Ramos MD;  Location: [facility] OR    TRANSPLANT LIVER RECIPIENT  DONOR N/A 2019    Procedure: TRANSPLANT, LIVER, RECIPIENT,  DONOR;  Surgeon: Александр Ramos MD;  Location: [facility] OR    VASCULAR SURGERY       Social History     Socioeconomic History    Marital status:      Spouse name: Not on file    Number of children: Not on file    Years of education: Not on file    Highest education level: Bachelor's degree (e.g., BA, AB, BS)   Occupational History    Not on file   Tobacco Use    Smoking status: Former     Types: Dip, chew, snus or snuff     Passive exposure: Past    Smokeless tobacco: Former     Types: Chew     Quit date: 10/31/2017    Tobacco comments:     Quit [place] after Doctor told me in 2017 that if I didn't I would   Vaping Use    Vaping Use: Never used   Substance and Sexual Activity    Alcohol use: No     Comment: quit 1996    Drug use: No    Sexual activity: Not Currently     Partners: Female     Birth control/protection: Condom   Other Topics Concern    Parent/sibling w/ CABG, MI or angioplasty before 65F 55M? No   Social History Narrative    Prior , [employer]     Social Determinants of Health     Financial Resource Strain: Low Risk  (2024)    Financial Resource Strain     Within the past 12 months, have you or your family members you live with been  unable to get utilities (heat, electricity) when it was really needed?: No   Food Insecurity: Low Risk  (1/23/2024)    Food Insecurity     Within the past 12 months, did you worry that your food would run out before you got money to buy more?: No     Within the past 12 months, did the food you bought just not last and you didn t have money to get more?: No   Transportation Needs: Low Risk  (1/23/2024)    Transportation Needs     Within the past 12 months, has lack of transportation kept you from medical appointments, getting your medicines, non-medical meetings or appointments, work, or from getting things that you need?: No   Physical Activity: Insufficiently Active (10/13/2020)    Exercise Vital Sign     Days of Exercise per Week: 1 day     Minutes of Exercise per Session: 60 min   Stress: Stress Concern Present (10/13/2020)    [employer] [place] [employer] - Occupational Stress Questionnaire     Feeling of Stress : To some extent   Social Connections: Socially Isolated (10/13/2020)    Social Connection and Isolation Panel [NHANES]     Frequency of Communication with Friends and Family: Once a week     Frequency of Social Gatherings with Friends and Family: Once a week     Attends Voodoo Services: Never     Active Member of Clubs or Organizations: No     Attends Club or Organization Meetings: Never     Marital Status:    Interpersonal Safety: Low Risk  (10/31/2023)    Interpersonal Safety     Do you feel physically and emotionally safe where you currently live?: Yes     Within the past 12 months, have you been hit, slapped, kicked or otherwise physically hurt by someone?: No     Within the past 12 months, have you been humiliated or emotionally abused in other ways by your partner or ex-partner?: No   Housing Stability: Low Risk  (1/23/2024)    Housing Stability     Do you have housing? : Yes     Are you worried about losing your housing?: No     Family History   Problem Relation Age of Onset     Skin Cancer Mother     Cancer Mother         mastectomy    Diabetes Mother          3/2016    Cerebrovascular Disease Mother         Passed away in Feb of this year, 80 years old.    Thyroid Disease Mother     Depression Mother     Breast Cancer Mother     Obesity Mother         280 pounds  from this and complications from D    Pancreatic Cancer Father 60    Prostate Cancer Father         Currently in Remission    Macular Degeneration Father     Glaucoma Father     Skin Cancer Father     Coronary Artery Disease Father         Started in his 70's  at 88 in [place]     Colon Cancer Father     Thyroid Disease Sister     Depression Sister     Asthma Sister     Sleep Apnea Brother     Colorectal Cancer Maternal Grandmother     Cancer Maternal Grandmother     Substance Abuse Maternal Grandmother         Alcohol    Prostate Cancer Maternal Grandfather     Substance Abuse Maternal Grandfather         Alcohol    Colorectal Cancer Paternal Grandmother     Asthma Sister         Had since birth    Liver Disease No family hx of     Melanoma No family hx of     Anesthesia Reaction No family hx of     Deep Vein Thrombosis (DVT) No family hx of        Lab Results   Component Value Date    A1C 5.7 2023    A1C 6.3 2023    A1C 6.9 2022    A1C 6.0 01/10/2022    A1C 6.8 2021    A1C 6.0 2020    A1C 6.3 2020    A1C 6.6 2018    A1C 6.5 2017    A1C 7.8 10/25/2016     Last Comprehensive Metabolic Panel:  Lab Results   Component Value Date     2024    POTASSIUM 5.4 (H) 2024    CHLORIDE 109 (H) 2024    CO2 21 (L) 2024    ANIONGAP 10 2024     (H) 2024    BUN 30.6 (H) 2024    CR 1.36 (H) 2024    GFRESTIMATED 60 (L) 2024    DEBORAH 9.0 2024     Lab Results   Component Value Date    AST 21 2024    AST 22 2024    AST 9 2021     Lab Results   Component Value Date    ALT 22 2024    ALT 23  "01/30/2024    ALT 20 06/28/2021     No results found for: \"BILICONJ\"   Lab Results   Component Value Date    BILITOTAL 0.4 01/30/2024    BILITOTAL 0.4 01/30/2024    BILITOTAL 0.5 06/28/2021     Lab Results   Component Value Date    ALBUMIN 4.5 01/30/2024    ALBUMIN 4.6 01/30/2024    ALBUMIN 4.3 07/20/2022    ALBUMIN 4.0 06/28/2021     Lab Results   Component Value Date    PROTTOTAL 7.1 01/30/2024    PROTTOTAL 7.3 01/30/2024    PROTTOTAL 7.4 06/28/2021      Lab Results   Component Value Date    ALKPHOS 91 01/30/2024    ALKPHOS 94 01/30/2024    ALKPHOS 98 06/28/2021     Lab Results   Component Value Date    WBC 6.6 01/30/2024    WBC 4.4 06/28/2021     Lab Results   Component Value Date    RBC 3.69 01/30/2024    RBC 2.35 06/28/2021     Lab Results   Component Value Date    HGB 11.4 01/30/2024    HGB 7.3 06/28/2021     Lab Results   Component Value Date    HCT 36.2 01/30/2024    HCT 22.6 06/28/2021     No components found for: \"MCT\"  Lab Results   Component Value Date    MCV 98 01/30/2024    MCV 96 06/28/2021     Lab Results   Component Value Date    MCH 30.9 01/30/2024    MCH 31.1 06/28/2021     Lab Results   Component Value Date    MCHC 31.5 01/30/2024    MCHC 32.3 06/28/2021     Lab Results   Component Value Date    RDW 14.7 01/30/2024    RDW 15.1 06/28/2021     Lab Results   Component Value Date     01/30/2024     06/28/2021       Subjective findings- 59-year-old returns to clinic for blisters right plantar fourth MPJ and Diabetic foot cares.  Relates he is doing well.  Relates to no new problems, relates his diabetic shoes are doing well, relates he could use his toenails cut, relates he has AmLactin cream but has not been using it.    Objective findings- DP and PT are 2 out of 4 bilaterally.  Has incurvated nails with some thickening dystrophy subungual debris and discoloration to differing degrees bilaterally.  Has mild scaly skin on the fifth MPJs bilaterally.  Has diffuse hyperkeratotic tissue " buildup plantar MPJs bilaterally.  There is no erythema, no drainage, no odor, no calor, no pain on palpation bilaterally.    Assessment and plan- Onychauxis and Onychomycosis bilaterally, Diabetes with peripheral Neuropathy and foot deformity and callus, blister right plantar fourth MPJ.  Blisters are resolved.  Diagnosis and treatment options discussed with the patient.  All the toenails were debrided reduced bilaterally upon consent.  Advised him on AmLactin cream use.  Return to clinic and see me in 2 months.  Previous notes reviewed.      Low to moderate level of medical decision making.      Again, thank you for allowing me to participate in the care of your patient.        Sincerely,        Lamin Gonzalez DPM

## 2024-02-27 ENCOUNTER — VIRTUAL VISIT (OUTPATIENT)
Dept: BEHAVIORAL HEALTH | Facility: CLINIC | Age: 59
End: 2024-02-27
Payer: MEDICARE

## 2024-02-27 DIAGNOSIS — F41.1 GENERALIZED ANXIETY DISORDER: ICD-10-CM

## 2024-02-27 DIAGNOSIS — F31.31 BIPOLAR 1 DISORDER, DEPRESSED, MILD (H): Primary | ICD-10-CM

## 2024-02-27 PROCEDURE — 90832 PSYTX W PT 30 MINUTES: CPT | Mod: 95 | Performed by: PSYCHOLOGIST

## 2024-03-06 ENCOUNTER — OFFICE VISIT (OUTPATIENT)
Dept: OPHTHALMOLOGY | Facility: CLINIC | Age: 59
End: 2024-03-06
Attending: OPHTHALMOLOGY
Payer: MEDICARE

## 2024-03-06 DIAGNOSIS — E11.3593 PROLIFERATIVE DIABETIC RETINOPATHY OF BOTH EYES ASSOCIATED WITH TYPE 2 DIABETES MELLITUS, UNSPECIFIED PROLIFERATIVE RETINOPATHY TYPE (H): ICD-10-CM

## 2024-03-06 PROCEDURE — 92134 CPTRZ OPH DX IMG PST SGM RTA: CPT | Performed by: OPHTHALMOLOGY

## 2024-03-06 PROCEDURE — 250N000011 HC RX IP 250 OP 636: Mod: JZ | Performed by: OPHTHALMOLOGY

## 2024-03-06 PROCEDURE — 99207 PR DROP WITH A PROCEDURE: CPT | Performed by: OPHTHALMOLOGY

## 2024-03-06 PROCEDURE — 67028 INJECTION EYE DRUG: CPT | Mod: RT | Performed by: OPHTHALMOLOGY

## 2024-03-06 RX ADMIN — FARICIMAB 6 MG: 6 INJECTION, SOLUTION INTRAVITREAL at 10:01

## 2024-03-06 ASSESSMENT — REFRACTION_WEARINGRX
OD_AXIS: 180
OD_SPHERE: -0.50
OS_CYLINDER: +0.25
OS_ADD: +2.75
OS_SPHERE: -0.50
OD_ADD: +2.75
OS_AXIS: 080
SPECS_TYPE: PAL
OD_CYLINDER: +0.50

## 2024-03-06 ASSESSMENT — VISUAL ACUITY
METHOD: SNELLEN - LINEAR
OS_CC+: -1
CORRECTION_TYPE: GLASSES
OD_CC+: -2
OD_CC: 20/20
OS_CC: 20/25

## 2024-03-06 ASSESSMENT — TONOMETRY
OD_IOP_MMHG: 19
OS_IOP_MMHG: 18
IOP_METHOD: TONOPEN

## 2024-03-06 ASSESSMENT — SLIT LAMP EXAM - LIDS
COMMENTS: SMALL PAPILLOMA ALONG LL MARGIN, NON CONCERNING
COMMENTS: NORMAL

## 2024-03-06 ASSESSMENT — EXTERNAL EXAM - LEFT EYE: OS_EXAM: NORMAL

## 2024-03-06 ASSESSMENT — EXTERNAL EXAM - RIGHT EYE: OD_EXAM: NORMAL

## 2024-03-06 ASSESSMENT — ENCOUNTER SYMPTOMS: JOINT SWELLING: 1

## 2024-03-06 NOTE — PROGRESS NOTES
CC:  Follow up Diabetic retinopathy     Interval:  Here for follow-up of Type 2 diabetes mellitus with PDR both eyes and macular edema. Possible inj  Feels eyes are dry    Review of systems: started chemo for GI Ca. Was in the ICU because of  kidney failure- emergent dialysis  He has had history of sinus issues worsening this year with allergy season. VA subjectively unchanged, no flashes, no floaters. Last visit he received a left eye injection only.  He is working on walking more which is helping with weight loss.     Lab Results   Component Value Date    A1C 5.7 12/18/2023    A1C 6.3 06/14/2023     HPI: Frandy Workman is a 59 year old patient status post Cataract extraction intraocular lens left eye  history of Diabetes mellitus for 24 ys . Patient on insulin.  Patient on eliquis because of  Ca  Interval History: s/p CEIOL left eye 1/3/22    Retinal Imaging:  Optical Coherence Tomography 03/06/24   RE: Resolved central Diabetic macular edema.   LE: Resolved intraretinal fluid. Mild Epiretinal membrane     fluorescein angiography 09/13/23  both eyes normal AV and choroidal filling ou. Staining laser scars. No obvious NVE, mild late macula leakage. peripheral leakage and capillary non perfusion.     Optos consistent with clinical exam    ASSESSMENT:     #T2DM   - HbA1c 5.7% (12/2023)  - Blood pressure (<120/80) and blood glucose (HbA1c <7.0, ~6.5 today) control discussed with patient. Patient advised that failure to adequately control each may lead to vision loss. The patient expressed understanding.    # Proliferative diabetic retinopathy left eye; pre-proliferative diabetic retinopathy right eye    # vitreous hemorrhage left eye 10/12/22   Status post Eylea inj left eye   Status post Panretinal laser photocoagulation (PRP) 9.28.22 left eye and Status post scatter PRP right eye 11/02/22     # Diabetic macular edema both eyes   - status post avastin in both eyes; Switch to Eylea 4/24/19 with improvement of  Diabetic macular edema     - Tried holding off on injection 3/29/23, but edema worsened both eyes  - last Eylea right eye; 8/02/23 interval improvement 09/13/23 (9 weeks prior)  -  discussed with to T&E vs closed observation on 9/13/23, He preferred to observe   -Given mild worsening 10/04/23 changed to Vabysmo right eye (10/04/23 #1; 1.10.23 #3)  - VA 20/25, Resolved DME  Consider ozurdex in the future if no response to vabysmo    # status post Cataract extraction intraocular lens both eyes   Right eye: 01/24/23; left eye 01/3/23  -IOP WNL today  Retina detachment and endophthalmitis precautions were discussed with the patient (increased blurry vision, drainage, new flashes, floaters or a curtain in the visual field) and was asked to return if any of the those occur    # Dry eye syndrome   Left eye worse than right eye   warm compresses and artificial tears  As needed  - punctal plugs previously left eye - reports this is better     # Status post liver transplant  - tx 11/2019  - severe anemia; s/p transfusion - anemia much improved   - being seen by his primary team    PLAN:- Vabysmo inj right eye #4 03/06/24   Will T&E  Last dilation 9.13.23  - Follow up in 10-12 weeks with Optical Coherence Tomography, dilation; possible Vabysmo right eye   Consider repeating fluorescein angiography in 4-6 months (around JUly Aug)    ~~~~~~~~~~~~~~~~~~~~~~~~~~~~~~~~~~   Complete documentation of historical and exam elements from today's encounter can be found in the full encounter summary report (not reduplicated in this progress note).  I personally obtained the chief complaint(s) and history of present illness.  I confirmed and edited as necessary the review of systems, past medical/surgical history, family history, social history, and examination findings as documented by others; and I examined the patient myself.  I personally reviewed the relevant tests, images, and reports as documented above.  I formulated and edited as  necessary the assessment and plan and discussed the findings and management plan with the patient and family and No resident or fellow assisted with the procedures performed.  I performed the procedures myself.    Enriqueta Martin MD   of Ophthalmology.  Retina Service   Department of Ophthalmology and Visual Neurosciences   [facility]  Phone: [phone]   Fax: [phone]

## 2024-03-08 RX ORDER — METOPROLOL SUCCINATE 25 MG/1
25 TABLET, EXTENDED RELEASE ORAL DAILY
Qty: 45 TABLET | Refills: 1 | Status: SHIPPED | OUTPATIENT
Start: 2024-03-08 | End: 2024-03-28

## 2024-03-11 ENCOUNTER — DOCUMENTATION ONLY (OUTPATIENT)
Dept: FAMILY MEDICINE | Facility: CLINIC | Age: 59
End: 2024-03-11
Payer: MEDICARE

## 2024-03-11 DIAGNOSIS — C22.0 HCC (HEPATOCELLULAR CARCINOMA) (H): Primary | ICD-10-CM

## 2024-03-11 DIAGNOSIS — C78.6 MALIGNANT NEOPLASM METASTATIC TO PERITONEUM (H): ICD-10-CM

## 2024-03-11 NOTE — PROGRESS NOTES
Type of Form Received:     Form Received (Date) 3/11/24   Form Filled out Yes, faxed 3/13   Placed in provider folder Yes

## 2024-03-12 ENCOUNTER — MYC MEDICAL ADVICE (OUTPATIENT)
Dept: INTERNAL MEDICINE | Facility: CLINIC | Age: 59
End: 2024-03-12
Payer: MEDICARE

## 2024-03-13 ENCOUNTER — LAB (OUTPATIENT)
Dept: LAB | Facility: CLINIC | Age: 59
End: 2024-03-13
Attending: INTERNAL MEDICINE
Payer: MEDICARE

## 2024-03-13 ENCOUNTER — DOCUMENTATION ONLY (OUTPATIENT)
Dept: ONCOLOGY | Facility: CLINIC | Age: 59
End: 2024-03-13

## 2024-03-13 DIAGNOSIS — Z79.899 ENCOUNTER FOR LONG-TERM (CURRENT) USE OF MEDICATIONS: ICD-10-CM

## 2024-03-13 DIAGNOSIS — C78.6 MALIGNANT NEOPLASM METASTATIC TO PERITONEUM (H): ICD-10-CM

## 2024-03-13 DIAGNOSIS — C22.0 HCC (HEPATOCELLULAR CARCINOMA) (H): Primary | ICD-10-CM

## 2024-03-13 DIAGNOSIS — C22.0 HCC (HEPATOCELLULAR CARCINOMA) (H): ICD-10-CM

## 2024-03-13 DIAGNOSIS — Z94.4 LIVER REPLACED BY TRANSPLANT (H): ICD-10-CM

## 2024-03-13 DIAGNOSIS — N18.32 STAGE 3B CHRONIC KIDNEY DISEASE (CKD) (H): ICD-10-CM

## 2024-03-13 LAB
ALBUMIN SERPL BCG-MCNC: 4.2 G/DL (ref 3.5–5.2)
ALP SERPL-CCNC: 92 U/L (ref 40–150)
ALT SERPL W P-5'-P-CCNC: 25 U/L (ref 0–70)
ANION GAP SERPL CALCULATED.3IONS-SCNC: 10 MMOL/L (ref 7–15)
AST SERPL W P-5'-P-CCNC: 26 U/L (ref 0–45)
BASOPHILS # BLD AUTO: 0 10E3/UL (ref 0–0.2)
BASOPHILS NFR BLD AUTO: 1 %
BILIRUB SERPL-MCNC: 0.3 MG/DL
BUN SERPL-MCNC: 32.3 MG/DL (ref 8–23)
CALCIUM SERPL-MCNC: 9.1 MG/DL (ref 8.8–10.2)
CHLORIDE SERPL-SCNC: 107 MMOL/L (ref 98–107)
CREAT SERPL-MCNC: 1.4 MG/DL (ref 0.67–1.17)
DEPRECATED HCO3 PLAS-SCNC: 22 MMOL/L (ref 22–29)
EGFRCR SERPLBLD CKD-EPI 2021: 58 ML/MIN/1.73M2
EOSINOPHIL # BLD AUTO: 0.1 10E3/UL (ref 0–0.7)
EOSINOPHIL NFR BLD AUTO: 2 %
ERYTHROCYTE [DISTWIDTH] IN BLOOD BY AUTOMATED COUNT: 14.9 % (ref 10–15)
GLUCOSE SERPL-MCNC: 132 MG/DL (ref 70–99)
HCT VFR BLD AUTO: 33 % (ref 40–53)
HGB BLD-MCNC: 10.3 G/DL (ref 13.3–17.7)
IMM GRANULOCYTES # BLD: 0 10E3/UL
IMM GRANULOCYTES NFR BLD: 1 %
LYMPHOCYTES # BLD AUTO: 0.8 10E3/UL (ref 0.8–5.3)
LYMPHOCYTES NFR BLD AUTO: 16 %
MAGNESIUM SERPL-MCNC: 1.9 MG/DL (ref 1.7–2.3)
MCH RBC QN AUTO: 30.7 PG (ref 26.5–33)
MCHC RBC AUTO-ENTMCNC: 31.2 G/DL (ref 31.5–36.5)
MCV RBC AUTO: 99 FL (ref 78–100)
MONOCYTES # BLD AUTO: 0.3 10E3/UL (ref 0–1.3)
MONOCYTES NFR BLD AUTO: 7 %
NEUTROPHILS # BLD AUTO: 3.9 10E3/UL (ref 1.6–8.3)
NEUTROPHILS NFR BLD AUTO: 73 %
NRBC # BLD AUTO: 0 10E3/UL
NRBC BLD AUTO-RTO: 0 /100
PHOSPHATE SERPL-MCNC: 3.7 MG/DL (ref 2.5–4.5)
PLATELET # BLD AUTO: 146 10E3/UL (ref 150–450)
POTASSIUM SERPL-SCNC: 5 MMOL/L (ref 3.4–5.3)
PROT SERPL-MCNC: 6.6 G/DL (ref 6.4–8.3)
RBC # BLD AUTO: 3.35 10E6/UL (ref 4.4–5.9)
SODIUM SERPL-SCNC: 139 MMOL/L (ref 135–145)
WBC # BLD AUTO: 5.2 10E3/UL (ref 4–11)

## 2024-03-13 PROCEDURE — 83735 ASSAY OF MAGNESIUM: CPT | Performed by: PATHOLOGY

## 2024-03-13 PROCEDURE — 36415 COLL VENOUS BLD VENIPUNCTURE: CPT | Performed by: PATHOLOGY

## 2024-03-13 PROCEDURE — 85025 COMPLETE CBC W/AUTO DIFF WBC: CPT | Performed by: PATHOLOGY

## 2024-03-13 PROCEDURE — 84100 ASSAY OF PHOSPHORUS: CPT | Performed by: PATHOLOGY

## 2024-03-13 PROCEDURE — 80053 COMPREHEN METABOLIC PANEL: CPT | Performed by: PATHOLOGY

## 2024-03-13 RX ORDER — SORAFENIB 200 MG/1
200 TABLET, FILM COATED ORAL 2 TIMES DAILY
Qty: 60 TABLET | Refills: 0 | Status: SHIPPED | OUTPATIENT
Start: 2024-03-14 | End: 2024-04-11

## 2024-03-13 NOTE — PROGRESS NOTES
Oral Chemotherapy Monitoring Program  Lab Follow Up    Reviewed lab results from 3/13/24.        11/21/2023     2:00 PM 12/4/2023     2:00 PM 12/12/2023     1:00 PM 12/27/2023    10:00 AM 1/17/2024     9:00 AM 2/13/2024     2:00 PM 3/13/2024    10:00 AM   ORAL CHEMOTHERAPY   Assessment Type Other Refill Lab Monitoring Refill Refill Refill Lab Monitoring   Diagnosis Code Hepatocellular Cancer Hepatocellular Cancer Hepatocellular Cancer Hepatocellular Cancer Hepatocellular Cancer Hepatocellular Cancer Hepatocellular Cancer   Providers Dr. Cherelle Villarreal   [facility]   Is this patient followed by the [facility] OC team? No No No No No No No   Drug Name Nexavar (sorafenib) Nexavar (sorafenib) Nexavar (sorafenib) Nexavar (sorafenib) Nexavar (sorafenib) Nexavar (sorafenib) Nexavar (sorafenib)   Dose 200 mg 200 mg 200 mg 200 mg 200 mg 200 mg 200 mg   Current Schedule BID BID BID BID BID BID BID   Cycle Details Continuous Continuous Continuous Continuous Continuous Continuous Continuous   Doses missed in last 2 weeks 0         Adherence Assessment Adherent         Adverse Effects No AE identified during assessment  No AE identified during assessment       Is the dose as ordered appropriate for the patient?   Yes           Labs:  Result Component Current Result Ref Range   Sodium 139 (3/13/2024) 135 - 145 mmol/L     Result Component Current Result Ref Range   Potassium 5.0 (3/13/2024) 3.4 - 5.3 mmol/L     Result Component Current Result Ref Range   Calcium 9.1 (3/13/2024) 8.8 - 10.2 mg/dL     Result Component Current Result Ref Range   Magnesium 1.9 (3/13/2024) 1.7 - 2.3 mg/dL     Result Component Current Result Ref Range   Phosphorus 3.7 (3/13/2024) 2.5 - 4.5 mg/dL     Result Component Current Result Ref Range   Albumin 4.2 (3/13/2024) 3.5 - 5.2 g/dL     Result Component Current Result Ref Range   Urea  Nitrogen 32.3 (H) (3/13/2024) 8.0 - 23.0 mg/dL     Result Component Current Result Ref Range   Creatinine 1.40 (H) (3/13/2024) 0.67 - 1.17 mg/dL     Result Component Current Result Ref Range   AST 26 (3/13/2024) 0 - 45 U/L     Result Component Current Result Ref Range   ALT 25 (3/13/2024) 0 - 70 U/L     Result Component Current Result Ref Range   Bilirubin Total 0.3 (3/13/2024) <=1.2 mg/dL     Result Component Current Result Ref Range   WBC Count 5.2 (3/13/2024) 4.0 - 11.0 10e3/uL     Result Component Current Result Ref Range   Hemoglobin 10.3 (L) (3/13/2024) 13.3 - 17.7 g/dL     Result Component Current Result Ref Range   Platelet Count 146 (L) (3/13/2024) 150 - 450 10e3/uL     Result Component Current Result Ref Range   Absolute Neutrophils 3.9 (3/13/2024) 1.6 - 8.3 10e3/uL        Assessment & Plan:  No concerning abnormalities.  Sent patient [employer] message.      Follow-Up:  Monthly Labs ~4/15/24    Les DavidD.  Oral Chemotherapy Monitoring Program  [phone]

## 2024-03-18 ENCOUNTER — OFFICE VISIT (OUTPATIENT)
Dept: GASTROENTEROLOGY | Facility: CLINIC | Age: 59
End: 2024-03-18
Attending: INTERNAL MEDICINE
Payer: MEDICARE

## 2024-03-18 VITALS
OXYGEN SATURATION: 99 % | HEART RATE: 78 BPM | SYSTOLIC BLOOD PRESSURE: 129 MMHG | BODY MASS INDEX: 25.3 KG/M2 | DIASTOLIC BLOOD PRESSURE: 81 MMHG | WEIGHT: 171.4 LBS

## 2024-03-18 DIAGNOSIS — Z94.4 STATUS POST LIVER TRANSPLANTATION (H): Primary | ICD-10-CM

## 2024-03-18 DIAGNOSIS — C22.0 HCC (HEPATOCELLULAR CARCINOMA) (H): ICD-10-CM

## 2024-03-18 PROCEDURE — 99215 OFFICE O/P EST HI 40 MIN: CPT | Mod: GC | Performed by: INTERNAL MEDICINE

## 2024-03-18 PROCEDURE — G0463 HOSPITAL OUTPT CLINIC VISIT: HCPCS | Performed by: INTERNAL MEDICINE

## 2024-03-18 NOTE — LETTER
"    3/18/2024         RE: Frandy Workman  [address] [facility] [address]        Dear Colleague,    Thank you for referring your patient, Frandy Workman, to the [facility] [place]. Please see a copy of my visit note below.    [facility] Hepatology    Follow-up Visit    Follow-up visit for liver transplant.    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  CT C/A/P in June 2023 obtained for elevated AFP showed cecal mass.  Diagnostic laparoscopy with biopsy in July 2023 consistent with recurrent HCC.  Patient was admitted to hospital in Aug 2023 with pre-renal JERONIMO on CKD secondary to GI losses from side effects of sorafenib, which was started in July 2023.  Patient was resumed at a lower dose of sorafenib in Sep 2023.      No hospitalizations or ED visits since last summer. Continues on mycophenolate 500mg BID and prednisone 5mg daily. States that he misses doses \"once in a blue moon\"/very rarely (last one month ago), otherwise very diligent about taking his meds.    Feeling well aside from side effects from " sorafenib. Having lots of diarrhea, about 3 times daily, plans to use loperamide. Sometimes feels bloated. Patient denies nausea, vomiting, melena, hematemesis or hematochezia. Oncologist aware of diarrhea, advised him to keep up with liquids given hx CKD. Drinking 170 oz daily. Will see renal again in 09/2024.    Has itching in center of back, but no other generalized itching. No jaundice. Does have mild swelling in ankles for the past month but none higher up in legs, also has generalized pain in legs mostly overnight from sorafenib. Patient denies fevers, sweats or chills. Diagnosed with RSV in 10/2023, still having breathing issues after that and manages well with inhalers.    Otherwise feeling the best he has in years. Walking 11,000-12,000 steps per day. Appetite good. Weight stable recently (though he reports he is down about 40 pounds since HCC recurrence diagnosis). Some muscular abdominal pain in lower quadrants, thinks it is due to restarting weightlifting recently. Also has neck pain from weightlifting, sees a chiropractor for that. A1c has been up and down, last 5.7%, he thinks it is the sorafenib. Blood sugars 100-180 at home. Will see endo next week. No alcohol use. Mental health is good.     Health maintenance  Skin check Nov 2023  Colonoscopy due Dec 2024  Lipids July 2023  Vaccinations up to date  Bone density April 2023    Medical hx Surgical hx   Past Medical History:   Diagnosis Date     Anemia 2013     Arthritis      BPH (benign prostatic hyperplasia)      CAD (coronary artery disease) 04/01/2019     Cholelithiasis      Conductive hearing loss 08/16/2017     Depressive disorder 1986    Suffer effects throughout life     Gastroesophageal reflux disease 12/01/2014     HCC (hepatocellular carcinoma) (H) 01/22/2019     History of blood transfusion 2019    Had blood transfussion until Dec 2022     History of diabetic retinopathy 07/2018     HTN (hypertension) 11/20/2019     Hyperlipidemia      Liver  cirrhosis secondary to ESTRADA (H)      Liver transplanted (H) 11/11/2019     Portal vein thrombosis 04/11/2019     SCC (squamous cell carcinoma) 02/15/2023    02/15/23:  Left temporal scalp     Type II diabetes mellitus (H)       Past Surgical History:   Procedure Laterality Date     ABDOMEN SURGERY  11/11/2019    Had Liver Transplant     BIOPSY  12/2022    Had biopsy done will have additional procedure 2/15/2023     BYPASS GRAFT ARTERY CORONARY N/A 07/14/2021    Procedure: median sternotomy, on cardiopulmonary bypass, CORONARY ARTERY BYPASS GRAFT (CABG) x2 with left greater saphenous vein endoscopic harvest and left internal mammery artery harvest;  Surgeon: Tom Zapata MD;  Location: [facility] OR     CARDIAC SURGERY  7/2021    Heart Graph. and bypass surgery     CHOLECYSTECTOMY  11/11/2022    Removed with Liver Transplant     COLONOSCOPY      2015     COLONOSCOPY N/A 12/06/2019    Procedure: COLONOSCOPY, WITH POLYPECTOMY AND BIOPSY;  Surgeon: Adam Morton MD;  Location:  GI     CV CENTRAL VENOUS CATHETER PLACEMENT N/A 07/12/2021    Procedure: Central Venous Catheter Placement;  Surgeon: Fermin Polanco MD;  Location: [facility] CARDIAC CATH LAB     CV CORONARY ANGIOGRAM N/A 07/12/2021    Procedure: Coronary Angiogram;  Surgeon: Fermin Polanco MD;  Location: [facility] CARDIAC CATH LAB     CV HEART CATHETERIZATION WITH POSSIBLE INTERVENTION N/A 02/26/2019    Procedure: CORS;  Surgeon: Jagdish Hoyt MD;  Location: [facility] CARDIAC CATH LAB     CV INTRA AORTIC BALLOON N/A 07/12/2021    Procedure: Intra Aortic Balloon Pump Insertion;  Surgeon: Fermin Polanco MD;  Location: [facility] CARDIAC CATH LAB     ESOPHAGOSCOPY, GASTROSCOPY, DUODENOSCOPY (EGD), COMBINED N/A 11/17/2016    Procedure: COMBINED ESOPHAGOSCOPY, GASTROSCOPY, DUODENOSCOPY (EGD);  Surgeon: Santi Rosas MD;  Location:  GI     ESOPHAGOSCOPY, GASTROSCOPY, DUODENOSCOPY (EGD), COMBINED  N/A 11/17/2017    Procedure: COMBINED ESOPHAGOSCOPY, GASTROSCOPY, DUODENOSCOPY (EGD);  EGD;  Surgeon: Santi Rosas MD;  Location: [facility] GI     ESOPHAGOSCOPY, GASTROSCOPY, DUODENOSCOPY (EGD), COMBINED N/A 12/28/2018    Procedure: EGD;  Surgeon: Santi Rosas MD;  Location: [facility] OR     ESOPHAGOSCOPY, GASTROSCOPY, DUODENOSCOPY (EGD), COMBINED N/A 12/06/2019    Procedure: ESOPHAGOGASTRODUODENOSCOPY, WITH BIOPSY;  Surgeon: Adam Morton MD;  Location:  GI     ESOPHAGOSCOPY, GASTROSCOPY, DUODENOSCOPY (EGD), COMBINED N/A 02/13/2020    Procedure: ESOPHAGOGASTRODUODENOSCOPY (EGD);  Surgeon: Santi Rosas MD;  Location:  GI     EXCISE MASS ABDOMEN N/A 07/13/2023    Procedure: Laparoscopic lysis of adhesions, laparoscopic resection of abdominal mass;  Surgeon: Ruiz Chu MD;  Location: [facility] OR     HEAD & NECK SURGERY      12/2017 at [facility].      IMPLANT GOLD WEIGHT EYELID Right 11/16/2017    Procedure: IMPLANT WEIGHT EYELID;  Right Upper Eyelid Weight, right tarsal strip lower eyelid;  Surgeon: Milana Malave MD;  Location: [facility] OR     IR CHEMO EMBOLIZATION  01/22/2019     KNEE SURGERY Left      ORTHOPEDIC SURGERY       PAROTIDECTOMY, RADICAL NECK DISSECTION Right 11/02/2017    Procedure: PAROTIDECTOMY, RADICAL NECK DISSECTION;  Right Superfacial Parotidectomy , Facial nerve repair. with [facility] facial nerve monitor.;  Surgeon: Asiya Morgan MD;  Location: [facility] OR     PHACOEMULSIFICATION CLEAR CORNEA WITH STANDARD INTRAOCULAR LENS IMPLANT Right 01/24/2023    Procedure: PHACOEMULSIFICATION, COMPLEX CATARACT, WITH INTRAOCULAR LENS IMPLANT WITH TRYPAN RIGHT EYE;  Surgeon: Enriqueta Martin MD;  Location: [facility] OR     PHACOEMULSIFICATION WITH STANDARD INTRAOCULAR LENS IMPLANT Left 01/03/2023    Procedure: PHACOEMULSIFICATION, COMPLEX CATARACT, WITH STANDARD INTRAOCULAR LENS IMPLANT INSERTION LEFT EYE;  Surgeon: Enriqueta Martin MD;  Location: [facility] OR     PICC INSERTION Left  2017    4fr SL BioFlo PICC, 44cm in the L basilic vein w/ tip in the low SVC     RETURN LIVER TRANSPLANT N/A 2019    Procedure: Exploratory laparotomy, hematoma evacuation, abdominal washout;  Surgeon: Александр Ramos MD;  Location: [facility] OR     TRANSPLANT LIVER RECIPIENT  DONOR N/A 2019    Procedure: TRANSPLANT, LIVER, RECIPIENT,  DONOR;  Surgeon: Александр Ramos MD;  Location: [facility] OR     VASCULAR SURGERY            Medications  Prior to Admission medications    Medication Sig Start Date End Date Taking? Authorizing Provider   acetaminophen (TYLENOL) 325 MG tablet TAKE 1 TABLET BY MOUTH EVERY SIX HOURS AS NEEDED FOR MILD PAIN 24   Eloy Roa MD   albuterol (PROAIR HFA/PROVENTIL HFA/VENTOLIN HFA) 108 (90 Base) MCG/ACT inhaler Inhale 2 puffs into the lungs every 4 hours as needed for shortness of breath, wheezing or cough 23   Lamin Ortiz MD   Alcohol Swabs (ALCOHOL PREP) 70 % PADS Use for glucose testing 4x daily  and insulin administration 4x daily. 7/3/23   Frannie Vasquez PA-C   ammonium lactate (AMLACTIN) 12 % external cream Apply topically daily as needed for dry skin (on feet) 23   Lamin Gonzalez DPM   benzoyl peroxide 5 % external liquid Use topically in showers as a body wash 1/3/24   Americo Cespedes MD   blood glucose (NO BRAND SPECIFIED) lancets standard Use to test blood sugar 1 times daily or as directed. 23   Frannie Vasquez PA-C   Continuous Blood Gluc Sensor (FREESTYLE CLAUDIA 2 SENSOR) [facility] 1 each See Admin Instructions Change every 14 days. 23   Frannie Vasquez PA-C   ELIQUIS ANTICOAGULANT 5 MG tablet TAKE ONE TABLET BY MOUTH TWICE A DAY 24   Miguel Villarreal MD   empagliflozin (JARDIANCE) 10 MG TABS tablet Take 1 tablet (10 mg) by mouth daily 23   Eloy Rao MD   insulin aspart (NOVOLOG PEN) 100 UNIT/ML pen Inject 5-10 Units Subcutaneous 4 times daily (with meals and nightly)  1unit : 8 g carb before meals.  Also add 1 unit : 50 mg/dl >180 before meals and at bedtime. Approx 45 units daily. 12/19/23   Frannie Vasquez PA-C   insulin degludec (TRESIBA FLEXTOUCH) 100 UNIT/ML pen Inject 15 units subcutaneous once daily 9/20/23   Frannie Vasquez PA-C   insulin pen needle (ULTICARE MICRO) 32G X 4 MM miscellaneous USE 5 PER DAY 2/1/24   Frannie Vasquez PA-C   K-Phos-Neutral 155-852-130 MG tablet Take 1 tablet by mouth 4 times daily 12/8/23   Brenda Wilson APRN CNP   ketoconazole (NIZORAL) 2 % external shampoo Apply thin layer topically to scalp in shower (leave on 5 min prior to rinse); may also use as a body wash 12/22/23   Omar Lyon MD   lamiVUDine (EPIVIR) 100 MG tablet Take 1 tablet (100 mg) by mouth daily 9/7/23   Santi Rosas MD   lisinopril (ZESTRIL) 5 MG tablet Take 1 tablet (5 mg) by mouth daily 10/17/23   Eloy Rao MD   loperamide (IMODIUM A-D) 2 MG tablet Take 1-2 tablets (2-4 mg) by mouth 4 times daily as needed for diarrhea 10/6/23   Brenda Wilson APRN CNP   loperamide (IMODIUM) 2 MG capsule TAKE ONE TO TWO CAPSULES BY MOUTH FOUR TIMES A DAY AS NEEDED FOR DIARRHEA. 2/20/24   Miguel Villarreal MD   magnesium oxide (MAG-OX) 400 MG tablet Take 1 tablet (400 mg) by mouth daily 2/21/24   Miguel Villarreal MD   metoprolol succinate ER (TOPROL XL) 25 MG 24 hr tablet Take 1 tablet (25 mg) by mouth daily 3/8/24   Eloy Rao MD   Multiple Vitamin (DAILY-GLADIS MULTIVITAMIN) TABS TAKE 1 TABLET BY MOUTH ONCE DAILY 1/18/24   Lamin Ortiz MD   mycophenolate (GENERIC EQUIVALENT) 250 MG capsule TAKE TWO CAPSULES BY MOUTH EVERY 12 HOURS 2/19/24   Santi Rosas MD   NIFEdipine ER OSMOTIC (PROCARDIA XL) 60 MG 24 hr tablet TAKE 1 TABLET(60 MG) BY MOUTH TWICE DAILY 12/17/23   Eloy Rao MD   ondansetron (ZOFRAN ODT) 8 MG ODT tab Take 1 tablet (8 mg) by mouth every 8 hours as needed for nausea 1/31/24    Lamin Ortiz MD   pantoprazole (PROTONIX) 40 MG EC tablet Take 1 tablet (40 mg) by mouth daily before breakfast 3/20/23   Santi Rosas MD   predniSONE (DELTASONE) 5 MG tablet Take 1 tablet (5 mg) by mouth daily 12/8/23   Santi Rosas MD   rosuvastatin (CRESTOR) 20 MG tablet Take 1 tablet (20 mg) by mouth daily 5/2/23   Manjeet Ireland MD   sodium zirconium cyclosilicate (LOKELMA) 10 g PACK packet Take 10 gram 3x/day for 2 days then 10 grams daily.  Patient taking differently: Take 10 g by mouth daily Take 10 gram 3x/day for 2 days then 10 grams daily. 12/19/23   Eloy Rao MD   SORAfenib (NEXAVAR) 200 MG tablet Take 1 tablet (200 mg) by mouth 2 times daily On an empty stomach 1 hour before or 2 hours after a meal. 3/14/24   Miguel Villarreal MD   SORAfenib (NEXAVAR) 200 MG tablet Take 1 tablet (200 mg) by mouth 2 times daily On an empty stomach 1 hour before or 2 hours after a meal. 2/13/24   Miguel Villarreal MD   SORAfenib (NEXAVAR) 200 MG tablet Take 1 tablet (200 mg) by mouth 2 times daily On an empty stomach 1 hour before or 2 hours after a meal. 1/17/24   Miguel Villarreal MD   tamsulosin (FLOMAX) 0.4 MG capsule Take 1 capsule (0.4 mg) by mouth daily 6/14/23   Lamin Ortiz MD   Vitamin D3 50 mcg (2000 units) tablet Take 1 tablet (50 mcg) by mouth daily 3/10/23   Bentley Brunner MD       Allergies  Allergies   Allergen Reactions     Codeine Other (See Comments)     Cannot take due to liver  Cannot tolerate oral narcotics     Seasonal Allergies      Sneezing, coughing, runny and itchy eyes       Review of systems  A 10-point review of systems was negative    Examination  /81   Pulse 78   Wt 77.7 kg (171 lb 6.4 oz)   SpO2 99%   BMI 25.30 kg/m    Body mass index is 25.3 kg/m .    Gen- well, NAD, A+Ox3, normal color  CVS- RRR  RS- CTA  Abd- soft, central obesity, non-distended, non-tender, positive bowel sounds  Extr- hands  normal, no LEILA, tenderness to bilateral lower legs  Skin- no rash or jaundice  Neuro- no asterixis  Psych- normal mood    Laboratory  Lab Results   Component Value Date     03/13/2024     06/28/2021    POTASSIUM 5.0 03/13/2024    POTASSIUM 7.7 08/10/2023    POTASSIUM 4.7 07/20/2022    POTASSIUM 4.6 06/28/2021    CHLORIDE 107 03/13/2024    CHLORIDE 105 07/20/2022    CHLORIDE 107 06/28/2021    CO2 22 03/13/2024    CO2 26 07/20/2022    CO2 25 06/28/2021    BUN 32.3 03/13/2024    BUN 32 07/20/2022    BUN 33 06/28/2021    CR 1.40 03/13/2024    CR 1.62 06/28/2021       Lab Results   Component Value Date    BILITOTAL 0.3 03/13/2024    BILITOTAL 0.5 06/28/2021    ALT 25 03/13/2024    ALT 20 06/28/2021    AST 26 03/13/2024    AST 9 06/28/2021    ALKPHOS 92 03/13/2024    ALKPHOS 98 06/28/2021       Lab Results   Component Value Date    ALBUMIN 4.2 03/13/2024    ALBUMIN 4.3 07/20/2022    ALBUMIN 4.0 06/28/2021    PROTTOTAL 6.6 03/13/2024    PROTTOTAL 7.4 06/28/2021        Lab Results   Component Value Date    WBC 5.2 03/13/2024    WBC 4.4 06/28/2021    HGB 10.3 03/13/2024    HGB 7.3 06/28/2021    MCV 99 03/13/2024    MCV 96 06/28/2021     03/13/2024     06/28/2021       Lab Results   Component Value Date    INR 1.16 08/10/2023    INR 1.09 01/24/2020       Radiology  CT Abdomen 01/30/2024  IMPRESSION:  1.  Status post liver transplantation without evidence of HCC.   2.  Stable nonspecific subcentimeter peritoneal nodules in the right  upper and left lower quadrants.  3.  No evidence of metastasis in the chest.    Assessment  59 year old male who presents for follow-up of liver transplant complicated by recurrent metastatic HCC as well as CKD.  Liver function tests normal on current immunosuppression.  Renal function stable on calcineurin inhibitor-free regimen, following with Dr. Rao for CKD.  Up to date with routine health care maintenance.     Plan  1.  Liver transplant  - continue MMF 500mg PO  Q12  - continue pred 5mg PO Q24  - labs per protocol     2.  Recurrent HCC  - continue to follow with oncology Dr. Villarreal   - continue sorafenib 200mg PO Q12     3.  Health care maintenance  - UTD     Follow-up in 12 months.    Myra Ackerman MD  Medicine-Pediatrics PGY-3    Patient staffed with Dr. Banuelos.    Attestation with edits by Santi Rosas MD at 3/18/2024  3:37 PM:  Physician Attestation  I, Santi Banuelos, saw this patient with the learner and agree with the learner s findings and plan of care as documented in the note.  I personally reviewed vital signs, medications, labs, and imaging.    Santi Banuelos  Date of Service (when I saw the patient): 03/18/24    I spent 40 minutes on the date of the encounter doing chart review, history and exam, documentation and further activities as noted above.       Again, thank you for allowing me to participate in the care of your patient.        Sincerely,        Santi Banuelos MD

## 2024-03-18 NOTE — PROGRESS NOTES
"[facility] Hepatology    Follow-up Visit    Follow-up visit for liver transplant.    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  CT C/A/P in June 2023 obtained for elevated AFP showed cecal mass.  Diagnostic laparoscopy with biopsy in July 2023 consistent with recurrent HCC.  Patient was admitted to hospital in Aug 2023 with pre-renal JERONIMO on CKD secondary to GI losses from side effects of sorafenib, which was started in July 2023.  Patient was resumed at a lower dose of sorafenib in Sep 2023.      No hospitalizations or ED visits since last summer. Continues on mycophenolate 500mg BID and prednisone 5mg daily. States that he misses doses \"once in a blue moon\"/very rarely (last one month ago), otherwise very diligent about taking his meds.    Feeling well aside from side effects from sorafenib. Having lots of diarrhea, about 3 times daily, plans to use loperamide. Sometimes feels bloated. Patient denies nausea, vomiting, melena, hematemesis or hematochezia. Oncologist aware of diarrhea, advised him to keep up with liquids given hx CKD. Drinking " 170 oz daily. Will see renal again in 09/2024.    Has itching in center of back, but no other generalized itching. No jaundice. Does have mild swelling in ankles for the past month but none higher up in legs, also has generalized pain in legs mostly overnight from sorafenib. Patient denies fevers, sweats or chills. Diagnosed with RSV in 10/2023, still having breathing issues after that and manages well with inhalers.    Otherwise feeling the best he has in years. Walking 11,000-12,000 steps per day. Appetite good. Weight stable recently (though he reports he is down about 40 pounds since HCC recurrence diagnosis). Some muscular abdominal pain in lower quadrants, thinks it is due to restarting weightlifting recently. Also has neck pain from weightlifting, sees a chiropractor for that. A1c has been up and down, last 5.7%, he thinks it is the sorafenib. Blood sugars 100-180 at home. Will see endo next week. No alcohol use. Mental health is good.     Health maintenance  Skin check Nov 2023  Colonoscopy due Dec 2024  Lipids July 2023  Vaccinations up to date  Bone density April 2023    Medical hx Surgical hx   Past Medical History:   Diagnosis Date    Anemia 2013    Arthritis     BPH (benign prostatic hyperplasia)     CAD (coronary artery disease) 04/01/2019    Cholelithiasis     Conductive hearing loss 08/16/2017    Depressive disorder 1986    Suffer effects throughout life    Gastroesophageal reflux disease 12/01/2014    HCC (hepatocellular carcinoma) (H) 01/22/2019    History of blood transfusion 2019    Had blood transfussion until Dec 2022    History of diabetic retinopathy 07/2018    HTN (hypertension) 11/20/2019    Hyperlipidemia     Liver cirrhosis secondary to ESTRADA (H)     Liver transplanted (H) 11/11/2019    Portal vein thrombosis 04/11/2019    SCC (squamous cell carcinoma) 02/15/2023    02/15/23:  Left temporal scalp    Type II diabetes mellitus (H)       Past Surgical History:   Procedure Laterality Date     ABDOMEN SURGERY  11/11/2019    Had Liver Transplant    BIOPSY  12/2022    Had biopsy done will have additional procedure 2/15/2023    BYPASS GRAFT ARTERY CORONARY N/A 07/14/2021    Procedure: median sternotomy, on cardiopulmonary bypass, CORONARY ARTERY BYPASS GRAFT (CABG) x2 with left greater saphenous vein endoscopic harvest and left internal mammery artery harvest;  Surgeon: Tom Zapata MD;  Location: [facility] OR    CARDIAC SURGERY  7/2021    Heart Graph. and bypass surgery    CHOLECYSTECTOMY  11/11/2022    Removed with Liver Transplant    COLONOSCOPY      2015    COLONOSCOPY N/A 12/06/2019    Procedure: COLONOSCOPY, WITH POLYPECTOMY AND BIOPSY;  Surgeon: Adam Morton MD;  Location:  GI    CV CENTRAL VENOUS CATHETER PLACEMENT N/A 07/12/2021    Procedure: Central Venous Catheter Placement;  Surgeon: Fermin Polanco MD;  Location: [facility] CARDIAC CATH LAB    CV CORONARY ANGIOGRAM N/A 07/12/2021    Procedure: Coronary Angiogram;  Surgeon: Fermin Polanco MD;  Location: [facility] CARDIAC CATH LAB    CV HEART CATHETERIZATION WITH POSSIBLE INTERVENTION N/A 02/26/2019    Procedure: CORS;  Surgeon: Jagdish Hoyt MD;  Location: [facility] CARDIAC CATH LAB    CV INTRA AORTIC BALLOON N/A 07/12/2021    Procedure: Intra Aortic Balloon Pump Insertion;  Surgeon: Fermin Polanco MD;  Location: [facility] CARDIAC CATH LAB    ESOPHAGOSCOPY, GASTROSCOPY, DUODENOSCOPY (EGD), COMBINED N/A 11/17/2016    Procedure: COMBINED ESOPHAGOSCOPY, GASTROSCOPY, DUODENOSCOPY (EGD);  Surgeon: Santi Rosas MD;  Location:  GI    ESOPHAGOSCOPY, GASTROSCOPY, DUODENOSCOPY (EGD), COMBINED N/A 11/17/2017    Procedure: COMBINED ESOPHAGOSCOPY, GASTROSCOPY, DUODENOSCOPY (EGD);  EGD;  Surgeon: Santi Rosas MD;  Location:  GI    ESOPHAGOSCOPY, GASTROSCOPY, DUODENOSCOPY (EGD), COMBINED N/A 12/28/2018    Procedure: EGD;  Surgeon: Santi Rosas MD;  Location:  OR     ESOPHAGOSCOPY, GASTROSCOPY, DUODENOSCOPY (EGD), COMBINED N/A 2019    Procedure: ESOPHAGOGASTRODUODENOSCOPY, WITH BIOPSY;  Surgeon: Adam Morton MD;  Location:  GI    ESOPHAGOSCOPY, GASTROSCOPY, DUODENOSCOPY (EGD), COMBINED N/A 2020    Procedure: ESOPHAGOGASTRODUODENOSCOPY (EGD);  Surgeon: Santi Rosas MD;  Location:  GI    EXCISE MASS ABDOMEN N/A 2023    Procedure: Laparoscopic lysis of adhesions, laparoscopic resection of abdominal mass;  Surgeon: Ruiz Chu MD;  Location: [facility] OR    HEAD & NECK SURGERY      2017 at [facility].     IMPLANT GOLD WEIGHT EYELID Right 2017    Procedure: IMPLANT WEIGHT EYELID;  Right Upper Eyelid Weight, right tarsal strip lower eyelid;  Surgeon: Milana Maalve MD;  Location: [facility] OR    IR CHEMO EMBOLIZATION  2019    KNEE SURGERY Left     ORTHOPEDIC SURGERY      PAROTIDECTOMY, RADICAL NECK DISSECTION Right 2017    Procedure: PAROTIDECTOMY, RADICAL NECK DISSECTION;  Right Superfacial Parotidectomy , Facial nerve repair. with [facility] facial nerve monitor.;  Surgeon: Asiya Morgan MD;  Location: [facility] OR    PHACOEMULSIFICATION CLEAR CORNEA WITH STANDARD INTRAOCULAR LENS IMPLANT Right 2023    Procedure: PHACOEMULSIFICATION, COMPLEX CATARACT, WITH INTRAOCULAR LENS IMPLANT WITH TRYPAN RIGHT EYE;  Surgeon: Enriqueta Martin MD;  Location: [facility] OR    PHACOEMULSIFICATION WITH STANDARD INTRAOCULAR LENS IMPLANT Left 2023    Procedure: PHACOEMULSIFICATION, COMPLEX CATARACT, WITH STANDARD INTRAOCULAR LENS IMPLANT INSERTION LEFT EYE;  Surgeon: Enriqueta Martin MD;  Location: [facility] OR    PICC INSERTION Left 2017    4fr SL BioFlo PICC, 44cm in the L basilic vein w/ tip in the low SVC    RETURN LIVER TRANSPLANT N/A 2019    Procedure: Exploratory laparotomy, hematoma evacuation, abdominal washout;  Surgeon: Александр Ramos MD;  Location:  OR    TRANSPLANT LIVER RECIPIENT  DONOR N/A 2019     Procedure: TRANSPLANT, LIVER, RECIPIENT,  DONOR;  Surgeon: Александр Ramos MD;  Location: [facility] OR    VASCULAR SURGERY            Medications  Prior to Admission medications    Medication Sig Start Date End Date Taking? Authorizing Provider   acetaminophen (TYLENOL) 325 MG tablet TAKE 1 TABLET BY MOUTH EVERY SIX HOURS AS NEEDED FOR MILD PAIN 24   Eloy Rao MD   albuterol (PROAIR HFA/PROVENTIL HFA/VENTOLIN HFA) 108 (90 Base) MCG/ACT inhaler Inhale 2 puffs into the lungs every 4 hours as needed for shortness of breath, wheezing or cough 23   Lamin Ortiz MD   Alcohol Swabs (ALCOHOL PREP) 70 % PADS Use for glucose testing 4x daily  and insulin administration 4x daily. 7/3/23   Frannie Vasquez PA-C   ammonium lactate (AMLACTIN) 12 % external cream Apply topically daily as needed for dry skin (on feet) 23   Lamin Gonzalez DPM   benzoyl peroxide 5 % external liquid Use topically in showers as a body wash 1/3/24   Americo Cespedes MD   blood glucose (NO BRAND SPECIFIED) lancets standard Use to test blood sugar 1 times daily or as directed. 23   Frannie Vasquez PA-C   Continuous Blood Gluc Sensor (FREESTYLE CLAUDIA 2 SENSOR) [facility] 1 each See Admin Instructions Change every 14 days. 23   Frannie Vasquez PA-C   ELIQUIS ANTICOAGULANT 5 MG tablet TAKE ONE TABLET BY MOUTH TWICE A DAY 24   Miguel Villarreal MD   empagliflozin (JARDIANCE) 10 MG TABS tablet Take 1 tablet (10 mg) by mouth daily 23   Eloy Rao MD   insulin aspart (NOVOLOG PEN) 100 UNIT/ML pen Inject 5-10 Units Subcutaneous 4 times daily (with meals and nightly) 1unit : 8 g carb before meals.  Also add 1 unit : 50 mg/dl >180 before meals and at bedtime. Approx 45 units daily. 23   Frannie Vasquez PA-C   insulin degludec (TRESIBA FLEXTOUCH) 100 UNIT/ML pen Inject 15 units subcutaneous once daily 23   Frannie Vasquez PA-C   insulin pen needle (ULTICARE MICRO)  32G X 4 MM miscellaneous USE 5 PER DAY 2/1/24   Frannie Vasquez PA-C   K-Phos-Neutral 155-852-130 MG tablet Take 1 tablet by mouth 4 times daily 12/8/23   Brenda Wilson APRN CNP   ketoconazole (NIZORAL) 2 % external shampoo Apply thin layer topically to scalp in shower (leave on 5 min prior to rinse); may also use as a body wash 12/22/23   Omar Lyon MD   lamiVUDine (EPIVIR) 100 MG tablet Take 1 tablet (100 mg) by mouth daily 9/7/23   Santi Rosas MD   lisinopril (ZESTRIL) 5 MG tablet Take 1 tablet (5 mg) by mouth daily 10/17/23   Eloy Rao MD   loperamide (IMODIUM A-D) 2 MG tablet Take 1-2 tablets (2-4 mg) by mouth 4 times daily as needed for diarrhea 10/6/23   Brenda Wilson APRN CNP   loperamide (IMODIUM) 2 MG capsule TAKE ONE TO TWO CAPSULES BY MOUTH FOUR TIMES A DAY AS NEEDED FOR DIARRHEA. 2/20/24   Miguel Villarreal MD   magnesium oxide (MAG-OX) 400 MG tablet Take 1 tablet (400 mg) by mouth daily 2/21/24   Miguel Villarreal MD   metoprolol succinate ER (TOPROL XL) 25 MG 24 hr tablet Take 1 tablet (25 mg) by mouth daily 3/8/24   Eloy Rao MD   Multiple Vitamin (DAILY-GLADIS MULTIVITAMIN) TABS TAKE 1 TABLET BY MOUTH ONCE DAILY 1/18/24   Lamin Ortiz MD   mycophenolate (GENERIC EQUIVALENT) 250 MG capsule TAKE TWO CAPSULES BY MOUTH EVERY 12 HOURS 2/19/24   Santi Rosas MD   NIFEdipine ER OSMOTIC (PROCARDIA XL) 60 MG 24 hr tablet TAKE 1 TABLET(60 MG) BY MOUTH TWICE DAILY 12/17/23   Eloy Rao MD   ondansetron (ZOFRAN ODT) 8 MG ODT tab Take 1 tablet (8 mg) by mouth every 8 hours as needed for nausea 1/31/24   Lamin Ortiz MD   pantoprazole (PROTONIX) 40 MG EC tablet Take 1 tablet (40 mg) by mouth daily before breakfast 3/20/23   Santi Rosas MD   predniSONE (DELTASONE) 5 MG tablet Take 1 tablet (5 mg) by mouth daily 12/8/23   Santi Rosas MD   rosuvastatin (CRESTOR) 20 MG tablet Take 1  tablet (20 mg) by mouth daily 5/2/23   Manjeet Ireland MD   sodium zirconium cyclosilicate (LOKELMA) 10 g PACK packet Take 10 gram 3x/day for 2 days then 10 grams daily.  Patient taking differently: Take 10 g by mouth daily Take 10 gram 3x/day for 2 days then 10 grams daily. 12/19/23   Eloy Rao MD   SORAfenib (NEXAVAR) 200 MG tablet Take 1 tablet (200 mg) by mouth 2 times daily On an empty stomach 1 hour before or 2 hours after a meal. 3/14/24   Miguel Villarreal MD   SORAfenib (NEXAVAR) 200 MG tablet Take 1 tablet (200 mg) by mouth 2 times daily On an empty stomach 1 hour before or 2 hours after a meal. 2/13/24   Miguel Villarreal MD   SORAfenib (NEXAVAR) 200 MG tablet Take 1 tablet (200 mg) by mouth 2 times daily On an empty stomach 1 hour before or 2 hours after a meal. 1/17/24   Miguel Villarreal MD   tamsulosin (FLOMAX) 0.4 MG capsule Take 1 capsule (0.4 mg) by mouth daily 6/14/23   Lamin Ortiz MD   Vitamin D3 50 mcg (2000 units) tablet Take 1 tablet (50 mcg) by mouth daily 3/10/23   Bentley Brunner MD       Allergies  Allergies   Allergen Reactions    Codeine Other (See Comments)     Cannot take due to liver  Cannot tolerate oral narcotics    Seasonal Allergies      Sneezing, coughing, runny and itchy eyes       Review of systems  A 10-point review of systems was negative    Examination  /81   Pulse 78   Wt 77.7 kg (171 lb 6.4 oz)   SpO2 99%   BMI 25.30 kg/m    Body mass index is 25.3 kg/m .    Gen- well, NAD, A+Ox3, normal color  CVS- RRR  RS- CTA  Abd- soft, central obesity, non-distended, non-tender, positive bowel sounds  Extr- hands normal, no BENTLEY, tenderness to bilateral lower legs  Skin- no rash or jaundice  Neuro- no asterixis  Psych- normal mood    Laboratory  Lab Results   Component Value Date     03/13/2024     06/28/2021    POTASSIUM 5.0 03/13/2024    POTASSIUM 7.7 08/10/2023    POTASSIUM 4.7 07/20/2022    POTASSIUM 4.6  06/28/2021    CHLORIDE 107 03/13/2024    CHLORIDE 105 07/20/2022    CHLORIDE 107 06/28/2021    CO2 22 03/13/2024    CO2 26 07/20/2022    CO2 25 06/28/2021    BUN 32.3 03/13/2024    BUN 32 07/20/2022    BUN 33 06/28/2021    CR 1.40 03/13/2024    CR 1.62 06/28/2021       Lab Results   Component Value Date    BILITOTAL 0.3 03/13/2024    BILITOTAL 0.5 06/28/2021    ALT 25 03/13/2024    ALT 20 06/28/2021    AST 26 03/13/2024    AST 9 06/28/2021    ALKPHOS 92 03/13/2024    ALKPHOS 98 06/28/2021       Lab Results   Component Value Date    ALBUMIN 4.2 03/13/2024    ALBUMIN 4.3 07/20/2022    ALBUMIN 4.0 06/28/2021    PROTTOTAL 6.6 03/13/2024    PROTTOTAL 7.4 06/28/2021        Lab Results   Component Value Date    WBC 5.2 03/13/2024    WBC 4.4 06/28/2021    HGB 10.3 03/13/2024    HGB 7.3 06/28/2021    MCV 99 03/13/2024    MCV 96 06/28/2021     03/13/2024     06/28/2021       Lab Results   Component Value Date    INR 1.16 08/10/2023    INR 1.09 01/24/2020       Radiology  CT Abdomen 01/30/2024  IMPRESSION:  1.  Status post liver transplantation without evidence of HCC.   2.  Stable nonspecific subcentimeter peritoneal nodules in the right  upper and left lower quadrants.  3.  No evidence of metastasis in the chest.    Assessment  59 year old male who presents for follow-up of liver transplant complicated by recurrent metastatic HCC as well as CKD.  Liver function tests normal on current immunosuppression.  Renal function stable on calcineurin inhibitor-free regimen, following with Dr. Rao for CKD.  Up to date with routine health care maintenance.     Plan  1.  Liver transplant  - continue MMF 500mg PO Q12  - continue pred 5mg PO Q24  - labs per protocol     2.  Recurrent HCC  - continue to follow with oncology Dr. Villarreal   - continue sorafenib 200mg PO Q12     3.  Health care maintenance  - UTD     Follow-up in 12 months.    Myra Ackerman MD  Medicine-Pediatrics PGY-3    Patient staffed with Dr. Banuelos.

## 2024-03-19 DIAGNOSIS — Z94.4 LIVER TRANSPLANT RECIPIENT (H): ICD-10-CM

## 2024-03-19 RX ORDER — PANTOPRAZOLE SODIUM 40 MG/1
40 TABLET, DELAYED RELEASE ORAL
Qty: 90 TABLET | Refills: 3 | Status: SHIPPED | OUTPATIENT
Start: 2024-03-19

## 2024-03-25 ENCOUNTER — TELEPHONE (OUTPATIENT)
Dept: ONCOLOGY | Facility: CLINIC | Age: 59
End: 2024-03-25
Payer: MEDICARE

## 2024-03-25 RX ORDER — CHOLECALCIFEROL (VITAMIN D3) 50 MCG
1 TABLET ORAL DAILY
Qty: 30 TABLET | Refills: 8 | Status: SHIPPED | OUTPATIENT
Start: 2024-03-25

## 2024-03-25 NOTE — TELEPHONE ENCOUNTER
LTD forms received via MAIL from [employer] .      Forms to be completed and put in folder for provider to approve.    Claim: [id number]    Amber Bolivar

## 2024-03-25 NOTE — TELEPHONE ENCOUNTER
LTD paperwork completed, checked for accuracy, signed and faxed to [employer] @ [phone]. A copy was made, sent to scanning and original mailed to patient at home address.    Successful transmission verified in Right Fax.    Amber Bolivar

## 2024-03-25 NOTE — TELEPHONE ENCOUNTER
LTD paperwork completed, checked for accuracy, signed and faxed to [employer] @ [phone]. A copy was made and sent to scanning    Successful transmission verified in Right Fax.    Amber Bolivar

## 2024-03-28 ENCOUNTER — MYC MEDICAL ADVICE (OUTPATIENT)
Dept: CARDIOLOGY | Facility: CLINIC | Age: 59
End: 2024-03-28
Payer: MEDICARE

## 2024-03-28 ENCOUNTER — MYC MEDICAL ADVICE (OUTPATIENT)
Dept: ONCOLOGY | Facility: CLINIC | Age: 59
End: 2024-03-28
Payer: MEDICARE

## 2024-03-28 DIAGNOSIS — I82.452 ACUTE DEEP VEIN THROMBOSIS (DVT) OF LEFT PERONEAL VEIN (H): ICD-10-CM

## 2024-03-28 DIAGNOSIS — Z95.1 S/P CABG (CORONARY ARTERY BYPASS GRAFT): ICD-10-CM

## 2024-03-28 DIAGNOSIS — Z79.4 TYPE 2 DIABETES MELLITUS WITH MICROALBUMINURIA, WITH LONG-TERM CURRENT USE OF INSULIN (H): ICD-10-CM

## 2024-03-28 DIAGNOSIS — E11.29 TYPE 2 DIABETES MELLITUS WITH MICROALBUMINURIA, WITH LONG-TERM CURRENT USE OF INSULIN (H): ICD-10-CM

## 2024-03-28 DIAGNOSIS — R80.9 TYPE 2 DIABETES MELLITUS WITH MICROALBUMINURIA, WITH LONG-TERM CURRENT USE OF INSULIN (H): ICD-10-CM

## 2024-03-28 DIAGNOSIS — E11.9 WELL CONTROLLED TYPE 2 DIABETES MELLITUS (H): ICD-10-CM

## 2024-03-28 DIAGNOSIS — E83.42 HYPOMAGNESEMIA: ICD-10-CM

## 2024-03-28 RX ORDER — MAGNESIUM OXIDE 400 MG/1
400 TABLET ORAL DAILY
Qty: 30 TABLET | Refills: 1 | Status: SHIPPED | OUTPATIENT
Start: 2024-03-28 | End: 2024-05-10

## 2024-03-28 RX ORDER — ROSUVASTATIN CALCIUM 20 MG/1
20 TABLET, COATED ORAL DAILY
Qty: 90 TABLET | Refills: 3 | Status: SHIPPED | OUTPATIENT
Start: 2024-03-28

## 2024-03-28 RX ORDER — METOPROLOL SUCCINATE 25 MG/1
25 TABLET, EXTENDED RELEASE ORAL DAILY
Qty: 45 TABLET | Refills: 1 | Status: SHIPPED | OUTPATIENT
Start: 2024-03-28 | End: 2024-06-03

## 2024-03-28 NOTE — CONFIDENTIAL NOTE
Magnesium oxide 400mg tablet and Eliquis 5mg tab  Last prescribing provider: Dr. Miguel Villarreal    Last clinic visit date: 2/1/24    Recommendations for requested medication (if none, N/A): NA    Any other pertinent information (if none, N/A): no refills on mag oxide; pt requesting both go to a [facility].     Refilled: Y/N, if NO, why?

## 2024-03-28 NOTE — TELEPHONE ENCOUNTER
3/28- Medication filled and sent to pt pharmacy.  Maximo NIETO RN  Nephrology care coordinator  [facility]

## 2024-04-02 ENCOUNTER — DOCUMENTATION ONLY (OUTPATIENT)
Dept: OTHER | Facility: CLINIC | Age: 59
End: 2024-04-02
Payer: MEDICARE

## 2024-04-09 ENCOUNTER — PATIENT OUTREACH (OUTPATIENT)
Dept: NEPHROLOGY | Facility: CLINIC | Age: 59
End: 2024-04-09
Payer: MEDICARE

## 2024-04-09 NOTE — PROGRESS NOTES
4/9- Called [facility] back in regards to home bp results and the following message sent to clinic.  My exercise steps and weights have been consistent but my BP is definitely high especially for me.  Need input if any change necessary.  Chemo is still at 200mg every 12 hours which is the minimum dose.  I will retested with Oncology on the 5th just thought it was important to share the above results.  I spoke w/Dr. Rao and he doesn't want to make any changes until he has completed chemotherapy.   No answer. LVM w/above information.  Maximo NIETO RN  Nephrology care coordinator  [facility]

## 2024-04-11 DIAGNOSIS — C22.0 HCC (HEPATOCELLULAR CARCINOMA) (H): Primary | ICD-10-CM

## 2024-04-11 DIAGNOSIS — C78.6 MALIGNANT NEOPLASM METASTATIC TO PERITONEUM (H): ICD-10-CM

## 2024-04-11 RX ORDER — SORAFENIB 200 MG/1
200 TABLET, FILM COATED ORAL 2 TIMES DAILY
Qty: 60 TABLET | Refills: 0 | Status: SHIPPED | OUTPATIENT
Start: 2024-04-13 | End: 2024-05-13

## 2024-04-15 ENCOUNTER — TELEPHONE (OUTPATIENT)
Dept: DERMATOLOGY | Facility: CLINIC | Age: 59
End: 2024-04-15

## 2024-04-15 NOTE — TELEPHONE ENCOUNTER
Left Voicemail (1st Attempt) and Sent Mychart (1st Attempt) for the patient to call back and schedule the following:    Appointment type: follow up  Provider: Coy  Return date: 05/2024  Specialty phone number: [phone]  Additional appointment(s) needed: na  Additonal Notes: na

## 2024-04-17 ENCOUNTER — MYC MEDICAL ADVICE (OUTPATIENT)
Dept: ONCOLOGY | Facility: CLINIC | Age: 59
End: 2024-04-17

## 2024-04-17 ENCOUNTER — LAB (OUTPATIENT)
Dept: LAB | Facility: CLINIC | Age: 59
End: 2024-04-17
Payer: MEDICARE

## 2024-04-17 DIAGNOSIS — C22.0 HCC (HEPATOCELLULAR CARCINOMA) (H): ICD-10-CM

## 2024-04-17 DIAGNOSIS — C78.6 MALIGNANT NEOPLASM METASTATIC TO PERITONEUM (H): ICD-10-CM

## 2024-04-17 LAB
ALBUMIN SERPL BCG-MCNC: 4.2 G/DL (ref 3.5–5.2)
ALP SERPL-CCNC: 120 U/L (ref 40–150)
ALT SERPL W P-5'-P-CCNC: 28 U/L (ref 0–70)
ANION GAP SERPL CALCULATED.3IONS-SCNC: 11 MMOL/L (ref 7–15)
AST SERPL W P-5'-P-CCNC: 35 U/L (ref 0–45)
BASOPHILS # BLD AUTO: 0 10E3/UL (ref 0–0.2)
BASOPHILS NFR BLD AUTO: 0 %
BILIRUB SERPL-MCNC: 0.5 MG/DL
BUN SERPL-MCNC: 23.2 MG/DL (ref 8–23)
CALCIUM SERPL-MCNC: 8.8 MG/DL (ref 8.8–10.2)
CHLORIDE SERPL-SCNC: 105 MMOL/L (ref 98–107)
CREAT SERPL-MCNC: 1.57 MG/DL (ref 0.67–1.17)
DEPRECATED HCO3 PLAS-SCNC: 22 MMOL/L (ref 22–29)
EGFRCR SERPLBLD CKD-EPI 2021: 50 ML/MIN/1.73M2
EOSINOPHIL # BLD AUTO: 0.1 10E3/UL (ref 0–0.7)
EOSINOPHIL NFR BLD AUTO: 2 %
ERYTHROCYTE [DISTWIDTH] IN BLOOD BY AUTOMATED COUNT: 14.7 % (ref 10–15)
GLUCOSE SERPL-MCNC: 203 MG/DL (ref 70–99)
HCT VFR BLD AUTO: 35.3 % (ref 40–53)
HGB BLD-MCNC: 11.3 G/DL (ref 13.3–17.7)
IMM GRANULOCYTES # BLD: 0 10E3/UL
IMM GRANULOCYTES NFR BLD: 0 %
LYMPHOCYTES # BLD AUTO: 0.8 10E3/UL (ref 0.8–5.3)
LYMPHOCYTES NFR BLD AUTO: 13 %
MAGNESIUM SERPL-MCNC: 2.2 MG/DL (ref 1.7–2.3)
MCH RBC QN AUTO: 30.3 PG (ref 26.5–33)
MCHC RBC AUTO-ENTMCNC: 32 G/DL (ref 31.5–36.5)
MCV RBC AUTO: 95 FL (ref 78–100)
MONOCYTES # BLD AUTO: 0.3 10E3/UL (ref 0–1.3)
MONOCYTES NFR BLD AUTO: 6 %
NEUTROPHILS # BLD AUTO: 4.8 10E3/UL (ref 1.6–8.3)
NEUTROPHILS NFR BLD AUTO: 79 %
NRBC # BLD AUTO: 0 10E3/UL
NRBC BLD AUTO-RTO: 0 /100
PHOSPHATE SERPL-MCNC: 3.3 MG/DL (ref 2.5–4.5)
PLATELET # BLD AUTO: 139 10E3/UL (ref 150–450)
POTASSIUM SERPL-SCNC: 5.4 MMOL/L (ref 3.4–5.3)
PROT SERPL-MCNC: 7.4 G/DL (ref 6.4–8.3)
RBC # BLD AUTO: 3.73 10E6/UL (ref 4.4–5.9)
SODIUM SERPL-SCNC: 138 MMOL/L (ref 135–145)
WBC # BLD AUTO: 6 10E3/UL (ref 4–11)

## 2024-04-17 PROCEDURE — 36415 COLL VENOUS BLD VENIPUNCTURE: CPT | Performed by: PATHOLOGY

## 2024-04-17 PROCEDURE — 83735 ASSAY OF MAGNESIUM: CPT | Performed by: PATHOLOGY

## 2024-04-17 PROCEDURE — 80053 COMPREHEN METABOLIC PANEL: CPT | Performed by: PATHOLOGY

## 2024-04-17 PROCEDURE — 84100 ASSAY OF PHOSPHORUS: CPT | Performed by: PATHOLOGY

## 2024-04-17 PROCEDURE — 85025 COMPLETE CBC W/AUTO DIFF WBC: CPT | Performed by: PATHOLOGY

## 2024-04-17 NOTE — ORAL ONC MGMT
Oral Chemotherapy Monitoring Program  Lab Follow Up    Reviewed lab results from 4/16.        12/12/2023     1:00 PM 12/27/2023    10:00 AM 1/17/2024     9:00 AM 2/13/2024     2:00 PM 3/13/2024    10:00 AM 4/11/2024    12:00 PM 4/17/2024     3:00 PM   ORAL CHEMOTHERAPY   Assessment Type Lab Monitoring Refill Refill Refill Lab Monitoring Refill    Diagnosis Code Hepatocellular Cancer Hepatocellular Cancer Hepatocellular Cancer Hepatocellular Cancer Hepatocellular Cancer Hepatocellular Cancer Hepatocellular Cancer   Providers Dr. Cherelle Villarreal   [facility]   Is this patient followed by the [facility] OC team? No No No No No No No   Drug Name Nexavar (sorafenib) Nexavar (sorafenib) Nexavar (sorafenib) Nexavar (sorafenib) Nexavar (sorafenib) Nexavar (sorafenib) Nexavar (sorafenib)   Dose 200 mg 200 mg 200 mg 200 mg 200 mg 200 mg 200 mg   Current Schedule BID BID BID BID BID BID BID   Cycle Details Continuous Continuous Continuous Continuous Continuous Continuous Continuous   Adverse Effects No AE identified during assessment         Is the dose as ordered appropriate for the patient? Yes             Labs:  _  Result Component Current Result Ref Range   Sodium 138 (4/17/2024) 135 - 145 mmol/L     _  Result Component Current Result Ref Range   Potassium 5.4 (H) (4/17/2024) 3.4 - 5.3 mmol/L     _  Result Component Current Result Ref Range   Calcium 8.8 (4/17/2024) 8.8 - 10.2 mg/dL     _  Result Component Current Result Ref Range   Magnesium 2.2 (4/17/2024) 1.7 - 2.3 mg/dL     _  Result Component Current Result Ref Range   Phosphorus 3.3 (4/17/2024) 2.5 - 4.5 mg/dL     _  Result Component Current Result Ref Range   Albumin 4.2 (4/17/2024) 3.5 - 5.2 g/dL     _  Result Component Current Result Ref Range   Urea Nitrogen 23.2 (H) (4/17/2024) 8.0 - 23.0 mg/dL     _  Result Component Current Result Ref Range    Creatinine 1.57 (H) (4/17/2024) 0.67 - 1.17 mg/dL     _  Result Component Current Result Ref Range   AST 35 (4/17/2024) 0 - 45 U/L     _  Result Component Current Result Ref Range   ALT 28 (4/17/2024) 0 - 70 U/L     _  Result Component Current Result Ref Range   Bilirubin Total 0.5 (4/17/2024) <=1.2 mg/dL     _  Result Component Current Result Ref Range   WBC Count 6.0 (4/17/2024) 4.0 - 11.0 10e3/uL     _  Result Component Current Result Ref Range   Hemoglobin 11.3 (L) (4/17/2024) 13.3 - 17.7 g/dL     _  Result Component Current Result Ref Range   Platelet Count 139 (L) (4/17/2024) 150 - 450 10e3/uL     No results found for ANC within last 30 days.     _  Result Component Current Result Ref Range   Absolute Neutrophils 4.8 (4/17/2024) 1.6 - 8.3 10e3/uL        Assessment & Plan:  No concerning abnormalities.    DOZt message sent to patient.    Follow-Up:  5/6 labs    Thank you,  Iglesia Carter, PharmD  Oral Chemotherapy Monitoring Program  [phone]

## 2024-04-24 DIAGNOSIS — Z94.4 STATUS POST LIVER TRANSPLANTATION (H): ICD-10-CM

## 2024-04-24 RX ORDER — PREDNISONE 5 MG/1
5 TABLET ORAL DAILY
Qty: 30 TABLET | Refills: 11 | Status: SHIPPED | OUTPATIENT
Start: 2024-04-24

## 2024-04-30 ENCOUNTER — ANCILLARY PROCEDURE (OUTPATIENT)
Dept: GENERAL RADIOLOGY | Facility: CLINIC | Age: 59
End: 2024-04-30
Attending: FAMILY MEDICINE
Payer: MEDICARE

## 2024-04-30 ENCOUNTER — OFFICE VISIT (OUTPATIENT)
Dept: FAMILY MEDICINE | Facility: CLINIC | Age: 59
End: 2024-04-30
Payer: MEDICARE

## 2024-04-30 ENCOUNTER — LAB (OUTPATIENT)
Dept: LAB | Facility: CLINIC | Age: 59
End: 2024-04-30
Payer: MEDICARE

## 2024-04-30 VITALS
HEART RATE: 71 BPM | WEIGHT: 160.6 LBS | BODY MASS INDEX: 23.79 KG/M2 | OXYGEN SATURATION: 99 % | SYSTOLIC BLOOD PRESSURE: 118 MMHG | DIASTOLIC BLOOD PRESSURE: 71 MMHG | HEIGHT: 69 IN

## 2024-04-30 DIAGNOSIS — R05.9 COUGH, UNSPECIFIED TYPE: Primary | ICD-10-CM

## 2024-04-30 DIAGNOSIS — I12.0 HYPERTENSIVE CHRONIC KIDNEY DISEASE WITH STAGE 5 CHRONIC KIDNEY DISEASE OR END STAGE RENAL DISEASE (H): Primary | ICD-10-CM

## 2024-04-30 DIAGNOSIS — E11.3593 PROLIFERATIVE DIABETIC RETINOPATHY OF BOTH EYES ASSOCIATED WITH TYPE 2 DIABETES MELLITUS, UNSPECIFIED PROLIFERATIVE RETINOPATHY TYPE (H): Primary | ICD-10-CM

## 2024-04-30 DIAGNOSIS — R05.9 COUGH, UNSPECIFIED TYPE: ICD-10-CM

## 2024-04-30 LAB
BASOPHILS # BLD AUTO: 0 10E3/UL (ref 0–0.2)
BASOPHILS NFR BLD AUTO: 1 %
CREAT UR-MCNC: 50.7 MG/DL
EOSINOPHIL # BLD AUTO: 0.1 10E3/UL (ref 0–0.7)
EOSINOPHIL NFR BLD AUTO: 2 %
ERYTHROCYTE [DISTWIDTH] IN BLOOD BY AUTOMATED COUNT: 14.6 % (ref 10–15)
HCT VFR BLD AUTO: 33.2 % (ref 40–53)
HGB BLD-MCNC: 10.5 G/DL (ref 13.3–17.7)
IMM GRANULOCYTES # BLD: 0.1 10E3/UL
IMM GRANULOCYTES NFR BLD: 1 %
LYMPHOCYTES # BLD AUTO: 0.6 10E3/UL (ref 0.8–5.3)
LYMPHOCYTES NFR BLD AUTO: 9 %
MCH RBC QN AUTO: 30.3 PG (ref 26.5–33)
MCHC RBC AUTO-ENTMCNC: 31.6 G/DL (ref 31.5–36.5)
MCV RBC AUTO: 96 FL (ref 78–100)
MICROALBUMIN UR-MCNC: 644 MG/L
MICROALBUMIN/CREAT UR: 1270.22 MG/G CR (ref 0–17)
MONOCYTES # BLD AUTO: 0.3 10E3/UL (ref 0–1.3)
MONOCYTES NFR BLD AUTO: 4 %
NEUTROPHILS # BLD AUTO: 5.8 10E3/UL (ref 1.6–8.3)
NEUTROPHILS NFR BLD AUTO: 83 %
NRBC # BLD AUTO: 0 10E3/UL
NRBC BLD AUTO-RTO: 0 /100
PLATELET # BLD AUTO: 208 10E3/UL (ref 150–450)
RBC # BLD AUTO: 3.47 10E6/UL (ref 4.4–5.9)
WBC # BLD AUTO: 6.9 10E3/UL (ref 4–11)

## 2024-04-30 PROCEDURE — 99214 OFFICE O/P EST MOD 30 MIN: CPT | Performed by: FAMILY MEDICINE

## 2024-04-30 PROCEDURE — 36415 COLL VENOUS BLD VENIPUNCTURE: CPT | Performed by: PATHOLOGY

## 2024-04-30 PROCEDURE — 82043 UR ALBUMIN QUANTITATIVE: CPT | Performed by: FAMILY MEDICINE

## 2024-04-30 PROCEDURE — 71046 X-RAY EXAM CHEST 2 VIEWS: CPT | Mod: GC | Performed by: RADIOLOGY

## 2024-04-30 PROCEDURE — 85025 COMPLETE CBC W/AUTO DIFF WBC: CPT | Performed by: PATHOLOGY

## 2024-04-30 PROCEDURE — 99000 SPECIMEN HANDLING OFFICE-LAB: CPT | Performed by: PATHOLOGY

## 2024-04-30 PROCEDURE — G2211 COMPLEX E/M VISIT ADD ON: HCPCS | Performed by: FAMILY MEDICINE

## 2024-04-30 RX ORDER — ALBUTEROL SULFATE 90 UG/1
2 AEROSOL, METERED RESPIRATORY (INHALATION) EVERY 4 HOURS PRN
Qty: 18 G | Refills: 1 | Status: SHIPPED | OUTPATIENT
Start: 2024-04-30

## 2024-04-30 RX ORDER — RESPIRATORY SYNCYTIAL VIRUS VACCINE 120MCG/0.5
0.5 KIT INTRAMUSCULAR ONCE
Qty: 1 EACH | Refills: 0 | Status: CANCELLED | OUTPATIENT
Start: 2024-04-30 | End: 2024-04-30

## 2024-04-30 RX ORDER — ALBUTEROL SULFATE 90 UG/1
2 AEROSOL, METERED RESPIRATORY (INHALATION) EVERY 4 HOURS PRN
Qty: 18 G | Refills: 1 | Status: SHIPPED | OUTPATIENT
Start: 2024-04-30 | End: 2024-04-30

## 2024-04-30 NOTE — PROGRESS NOTES
General Cardiology Clinic      HPI: Mr. Frandy Workman is a 59 year old male presenting to cardiology clinic today for annual follow up. 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     Follows with Dr. Ireland, last clinic visit 5/2/23. During this visit, he denied any symptoms, but has recently been put on lisinopril by nephrology. He also reported a weight gain of about 40 lbs due to decreased activity. Rosuvastatin was increased due to elevated TG.    Today in clinic, he denies any current chest pain, palpitations, dizziness, lower extremity edema. Patient says he has been sick for about 2 weeks, and is on antibiotics. He states that he has been less active during his illness and has felt short of breath going up the stairs, but feels like things are improving now over the past few days. Normally does 10,000-14,000 steps a day and has 4 flights of stairs at home that he says he typically has no issue with.    Blood pressures at home usually run 110-120's/60-70.      Current Cardiac Medications:   Eliquis 5 mg BID  Jardiance 10 mg daily  Lisinopril 5 mg daily  Toprol 25 mg daily  Procardia 60 mg daily  Rosuvastatin 20 mg daily      PAST MEDICAL HISTORY:  Past Medical History:   Diagnosis Date    Anemia 2013    Arthritis     BPH (benign prostatic hyperplasia)     CAD (coronary artery disease) 04/01/2019    Cholelithiasis     Conductive hearing loss 08/16/2017    Depressive disorder 1986    Suffer effects throughout life    Gastroesophageal reflux disease 12/01/2014    HCC (hepatocellular carcinoma) (H) 01/22/2019    History of blood transfusion 2019    Had blood transfussion until Dec 2022    History of diabetic retinopathy 07/2018    HTN (hypertension) 11/20/2019    Hyperlipidemia     Liver cirrhosis secondary to ESTRADA (H)     Liver  transplanted (H) 11/11/2019    Portal vein thrombosis 04/11/2019    SCC (squamous cell carcinoma) 02/15/2023    02/15/23:  Left temporal scalp    Type II diabetes mellitus (H)        CURRENT MEDICATIONS:  Current Outpatient Medications   Medication Sig Dispense Refill    acetaminophen (TYLENOL) 325 MG tablet TAKE 1 TABLET BY MOUTH EVERY SIX HOURS AS NEEDED FOR MILD PAIN 100 tablet 3    albuterol (PROAIR HFA/PROVENTIL HFA/VENTOLIN HFA) 108 (90 Base) MCG/ACT inhaler Inhale 2 puffs into the lungs every 4 hours as needed for shortness of breath, wheezing or cough 18 g 1    Alcohol Swabs (ALCOHOL PREP) 70 % PADS Use for glucose testing 4x daily  and insulin administration 4x daily. 400 each 1    ammonium lactate (AMLACTIN) 12 % external cream Apply topically daily as needed for dry skin (on feet) 140 g 5    amoxicillin-clavulanate (AUGMENTIN) 875-125 MG tablet Take 1 tablet by mouth 2 times daily 20 tablet 0    apixaban ANTICOAGULANT (ELIQUIS ANTICOAGULANT) 5 MG tablet Take 1 tablet (5 mg) by mouth 2 times daily 60 tablet 3    benzoyl peroxide 5 % external liquid Use topically in showers as a body wash 226 g 9    blood glucose (NO BRAND SPECIFIED) lancets standard Use to test blood sugar 1 times daily or as directed. 100 each 11    Continuous Blood Gluc Sensor (FREESTYLE CLAUDIA 2 SENSOR) MISC 1 each See Admin Instructions Change every 14 days. 7 each 3    empagliflozin (JARDIANCE) 10 MG TABS tablet Take 1 tablet (10 mg) by mouth daily 30 tablet 11    insulin aspart (NOVOLOG PEN) 100 UNIT/ML pen Inject 5-10 Units Subcutaneous 4 times daily (with meals and nightly) 1unit : 8 g carb before meals.  Also add 1 unit : 50 mg/dl >180 before meals and at bedtime. Approx 45 units daily. 45 mL 1    insulin degludec (TRESIBA FLEXTOUCH) 100 UNIT/ML pen Inject 15 units subcutaneous once daily 45 mL 3    insulin pen needle (ULTICARE MICRO) 32G X 4 MM miscellaneous USE 5 PER  each 3    K-Phos-Neutral 155-852-130 MG tablet Take 1  tablet by mouth 4 times daily 120 tablet 1    ketoconazole (NIZORAL) 2 % external shampoo Apply thin layer topically to scalp in shower (leave on 5 min prior to rinse); may also use as a body wash 120 mL 11    lamiVUDine (EPIVIR) 100 MG tablet Take 1 tablet (100 mg) by mouth daily 90 tablet 3    lisinopril (ZESTRIL) 5 MG tablet Take 1 tablet (5 mg) by mouth daily 90 tablet 3    loperamide (IMODIUM) 2 MG capsule TAKE ONE TO TWO CAPSULES BY MOUTH FOUR TIMES A DAY AS NEEDED FOR DIARRHEA. 120 capsule 2    magnesium oxide (MAG-OX) 400 MG tablet Take 1 tablet (400 mg) by mouth daily 30 tablet 1    metoprolol succinate ER (TOPROL XL) 25 MG 24 hr tablet Take 1 tablet (25 mg) by mouth daily 45 tablet 1    Multiple Vitamin (DAILY-GLADIS MULTIVITAMIN) TABS TAKE 1 TABLET BY MOUTH ONCE DAILY 30 tablet 11    mycophenolate (GENERIC EQUIVALENT) 250 MG capsule TAKE TWO CAPSULES BY MOUTH EVERY 12 HOURS 120 capsule 11    NIFEdipine ER OSMOTIC (PROCARDIA XL) 60 MG 24 hr tablet TAKE 1 TABLET(60 MG) BY MOUTH TWICE DAILY 6 tablet 0    omega 3 1000 MG CAPS Take 2,000 mg by mouth 2 times daily 120 capsule 10    ondansetron (ZOFRAN ODT) 8 MG ODT tab Take 1 tablet (8 mg) by mouth every 8 hours as needed for nausea 15 tablet 3    pantoprazole (PROTONIX) 40 MG EC tablet TAKE 1 TABLET BY MOUTH ONCE DAILY BEFORE BREAKFAST 90 tablet 3    predniSONE (DELTASONE) 5 MG tablet TAKE ONE TABLET BY MOUTH ONCE DAILY 30 tablet 11    rosuvastatin (CRESTOR) 20 MG tablet Take 1 tablet (20 mg) by mouth daily 90 tablet 3    sodium zirconium cyclosilicate (LOKELMA) 10 g PACK packet Take 10 gram 3x/day for 2 days then 10 grams daily. (Patient taking differently: Take 10 g by mouth daily Take 10 gram 3x/day for 2 days then 10 grams daily.) 30 packet 11    SORAfenib (NEXAVAR) 200 MG tablet Take 1 tablet (200 mg) by mouth 2 times daily On an empty stomach 1 hour before or 2 hours after a meal. 60 tablet 0    tamsulosin (FLOMAX) 0.4 MG capsule Take 1 capsule (0.4 mg) by  mouth daily 90 capsule 3    vitamin D3 (CHOLECALCIFEROL) 50 mcg (2000 units) tablet TAKE 1 TABLET BY MOUTH ONCE DAILY 30 tablet 8    loperamide (IMODIUM A-D) 2 MG tablet Take 1-2 tablets (2-4 mg) by mouth 4 times daily as needed for diarrhea 120 tablet 3       PAST SURGICAL HISTORY:  Past Surgical History:   Procedure Laterality Date    ABDOMEN SURGERY  11/11/2019    Had Liver Transplant    BIOPSY  12/2022    Had biopsy done will have additional procedure 2/15/2023    BYPASS GRAFT ARTERY CORONARY N/A 07/14/2021    Procedure: median sternotomy, on cardiopulmonary bypass, CORONARY ARTERY BYPASS GRAFT (CABG) x2 with left greater saphenous vein endoscopic harvest and left internal mammery artery harvest;  Surgeon: Tom Zapata MD;  Location:  OR    CARDIAC SURGERY  7/2021    Heart Graph. and bypass surgery    CHOLECYSTECTOMY  11/11/2022    Removed with Liver Transplant    COLONOSCOPY      2015    COLONOSCOPY N/A 12/06/2019    Procedure: COLONOSCOPY, WITH POLYPECTOMY AND BIOPSY;  Surgeon: Adam Mroton MD;  Location: [facility]    CV CENTRAL VENOUS CATHETER PLACEMENT N/A 07/12/2021    Procedure: Central Venous Catheter Placement;  Surgeon: Fermin Polanco MD;  Location: [facility] CARDIAC CATH LAB    CV CORONARY ANGIOGRAM N/A 07/12/2021    Procedure: Coronary Angiogram;  Surgeon: Fermin Polanco MD;  Location: [facility] CARDIAC CATH LAB    CV HEART CATHETERIZATION WITH POSSIBLE INTERVENTION N/A 02/26/2019    Procedure: CORS;  Surgeon: Jagdish Hoyt MD;  Location: [facility] CARDIAC CATH LAB    CV INTRA AORTIC BALLOON N/A 07/12/2021    Procedure: Intra Aortic Balloon Pump Insertion;  Surgeon: Fermin Polanco MD;  Location: [facility] CARDIAC CATH LAB    ESOPHAGOSCOPY, GASTROSCOPY, DUODENOSCOPY (EGD), COMBINED N/A 11/17/2016    Procedure: COMBINED ESOPHAGOSCOPY, GASTROSCOPY, DUODENOSCOPY (EGD);  Surgeon: Santi Rosas MD;  Location:  GI     ESOPHAGOSCOPY, GASTROSCOPY, DUODENOSCOPY (EGD), COMBINED N/A 11/17/2017    Procedure: COMBINED ESOPHAGOSCOPY, GASTROSCOPY, DUODENOSCOPY (EGD);  EGD;  Surgeon: Santi Rosas MD;  Location: [facility] GI    ESOPHAGOSCOPY, GASTROSCOPY, DUODENOSCOPY (EGD), COMBINED N/A 12/28/2018    Procedure: EGD;  Surgeon: Santi Rosas MD;  Location: [facility] OR    ESOPHAGOSCOPY, GASTROSCOPY, DUODENOSCOPY (EGD), COMBINED N/A 12/06/2019    Procedure: ESOPHAGOGASTRODUODENOSCOPY, WITH BIOPSY;  Surgeon: Adam Morton MD;  Location: [facility] GI    ESOPHAGOSCOPY, GASTROSCOPY, DUODENOSCOPY (EGD), COMBINED N/A 02/13/2020    Procedure: ESOPHAGOGASTRODUODENOSCOPY (EGD);  Surgeon: Santi Rosas MD;  Location:  GI    EXCISE MASS ABDOMEN N/A 07/13/2023    Procedure: Laparoscopic lysis of adhesions, laparoscopic resection of abdominal mass;  Surgeon: Ruiz Chu MD;  Location:  OR    HEAD & NECK SURGERY      12/2017 at [facility].     IMPLANT GOLD WEIGHT EYELID Right 11/16/2017    Procedure: IMPLANT WEIGHT EYELID;  Right Upper Eyelid Weight, right tarsal strip lower eyelid;  Surgeon: Milana Malave MD;  Location:  OR    IR CHEMO EMBOLIZATION  01/22/2019    KNEE SURGERY Left     ORTHOPEDIC SURGERY      PAROTIDECTOMY, RADICAL NECK DISSECTION Right 11/02/2017    Procedure: PAROTIDECTOMY, RADICAL NECK DISSECTION;  Right Superfacial Parotidectomy , Facial nerve repair. with [facility] facial nerve monitor.;  Surgeon: Asiya Morgan MD;  Location: [facility] OR    PHACOEMULSIFICATION CLEAR CORNEA WITH STANDARD INTRAOCULAR LENS IMPLANT Right 01/24/2023    Procedure: PHACOEMULSIFICATION, COMPLEX CATARACT, WITH INTRAOCULAR LENS IMPLANT WITH TRYPAN RIGHT EYE;  Surgeon: Enriqueta Martin MD;  Location: [facility] OR    PHACOEMULSIFICATION WITH STANDARD INTRAOCULAR LENS IMPLANT Left 01/03/2023    Procedure: PHACOEMULSIFICATION, COMPLEX CATARACT, WITH STANDARD INTRAOCULAR LENS IMPLANT INSERTION LEFT EYE;  Surgeon: Enriqueta Martin MD;   Location: [facility] OR    PICC INSERTION Left 2017    4fr SL BioFlo PICC, 44cm in the L basilic vein w/ tip in the low SVC    RETURN LIVER TRANSPLANT N/A 2019    Procedure: Exploratory laparotomy, hematoma evacuation, abdominal washout;  Surgeon: Александр Ramos MD;  Location: [facility] OR    TRANSPLANT LIVER RECIPIENT  DONOR N/A 2019    Procedure: TRANSPLANT, LIVER, RECIPIENT,  DONOR;  Surgeon: Александр Ramos MD;  Location: [facility] OR    VASCULAR SURGERY         ALLERGIES:  Allergies   Allergen Reactions    Codeine Other (See Comments)     Cannot take due to liver  Cannot tolerate oral narcotics    Seasonal Allergies      Sneezing, coughing, runny and itchy eyes       FAMILY HISTORY:  Family History   Problem Relation Age of Onset    Skin Cancer Mother     Cancer Mother         mastectomy    Diabetes Mother          3/2016    Cerebrovascular Disease Mother         Passed away in Feb of this year, 80 years old.    Thyroid Disease Mother     Depression Mother     Breast Cancer Mother     Obesity Mother         280 pounds  from this and complications from D    Pancreatic Cancer Father 60    Prostate Cancer Father         Currently in Remission    Macular Degeneration Father     Glaucoma Father     Skin Cancer Father     Coronary Artery Disease Father         Started in his 70's  at 88 in Octobe2023    Colon Cancer Father     Thyroid Disease Sister     Depression Sister     Asthma Sister     Sleep Apnea Brother     Colorectal Cancer Maternal Grandmother     Cancer Maternal Grandmother     Substance Abuse Maternal Grandmother         Alcohol    Prostate Cancer Maternal Grandfather     Substance Abuse Maternal Grandfather         Alcohol    Colorectal Cancer Paternal Grandmother     Asthma Sister         Had since birth    Liver Disease No family hx of     Melanoma No family hx of     Anesthesia Reaction No family hx of     Deep Vein Thrombosis (DVT) No family hx of        SOCIAL  HISTORY:  Social History     Tobacco Use    Smoking status: Former     Types: Dip, chew, snus or snuff     Passive exposure: Past    Smokeless tobacco: Former     Types: Chew     Quit date: 10/31/2017    Tobacco comments:     Quit [place] after Doctor told me in 2017 that if I didn't I would   Vaping Use    Vaping status: Never Used   Substance Use Topics    Alcohol use: No     Comment: quit Sept. 1996    Drug use: No       ROS:   Constitutional: No fever, chills, or sweats.   Cardiovascular: As per HPI.       Exam:  /77 (BP Location: Right arm, Patient Position: Sitting, Cuff Size: Adult Regular)   Pulse 80   Wt 73.8 kg (162 lb 12.8 oz)   SpO2 100%   BMI 24.03 kg/m     GENERAL: alert and no distress  HEENT: no icterus, no central cyanosis  LYMPH/NECK: no adenopathy, no asymmetry  RESPIRATORY: lungs clear to auscultation - no rales, rhonchi or wheezes, no use of accessory muscles, respirations are unlabored, normal respiratory rate  CARDIOVASCULAR: RRR, +S1S2, no murmur, rub, or gallop.   GI: soft, non tender  EXTREMITIES: peripheral pulses normal, no peripheral edema  NEURO: alert, normal speech and affect  SKIN: no jaundice, no rashes or lesions      Labs:  Lab Results   Component Value Date    WBC 6.9 04/30/2024    WBC 4.4 06/28/2021    RBC 3.47 (L) 04/30/2024    RBC 2.35 (L) 06/28/2021    HGB 10.5 (L) 04/30/2024    HGB 7.3 (L) 06/28/2021    HCT 33.2 (L) 04/30/2024    HCT 22.6 (L) 06/28/2021    MCV 96 04/30/2024    MCV 96 06/28/2021    MCH 30.3 04/30/2024    MCH 31.1 06/28/2021    MCHC 31.6 04/30/2024    MCHC 32.3 06/28/2021    RDW 14.6 04/30/2024    RDW 15.1 (H) 06/28/2021     04/30/2024     (L) 06/28/2021     Lab Results   Component Value Date     04/17/2024     06/28/2021    POTASSIUM 5.4 (H) 04/17/2024    POTASSIUM 7.7 (HH) 08/10/2023    POTASSIUM 4.7 07/20/2022    POTASSIUM 4.6 06/28/2021    CHLORIDE 105 04/17/2024    CHLORIDE 105 07/20/2022    CHLORIDE 107 06/28/2021     CO2 22 04/17/2024    CO2 26 07/20/2022    CO2 25 06/28/2021    ANIONGAP 11 04/17/2024    ANIONGAP 7 07/20/2022    ANIONGAP 5 06/28/2021     (H) 04/17/2024     (H) 08/16/2023     (H) 07/20/2022     (H) 06/28/2021    BUN 23.2 (H) 04/17/2024    BUN 32 (H) 07/20/2022    BUN 33 (H) 06/28/2021    CR 1.57 (H) 04/17/2024    CR 1.62 (H) 06/28/2021    GFRESTIMATED 50 (L) 04/17/2024    GFRESTIMATED 46 (L) 01/30/2024    GFRESTIMATED 46 (L) 06/28/2021    GFRESTBLACK 54 (L) 06/28/2021    DEBORAH 8.8 04/17/2024    DEBORAH 9.1 06/28/2021      Lab Results   Component Value Date    INR 1.16 (H) 08/10/2023    INR 1.09 01/24/2020     Lab Results   Component Value Date    CHOL 178 07/28/2023    CHOL 146 09/25/2020     Lab Results   Component Value Date    HDL 34 07/28/2023    HDL 39 09/25/2020     Lab Results   Component Value Date    LDL  07/28/2023      Comment:      Cannot estimate LDL when triglyceride exceeds 400 mg/dL    LDL 61 09/25/2020     Lab Results   Component Value Date    TRIG 405 07/28/2023    TRIG 228 09/25/2020       Diagnostics  EKG 8/24/23      Echocardiogram 3/15/22  Left ventricular function is normal.The ejection fraction is > 65%. Abnormal  non-specific septal motion is present.  Global right ventricular function is normal. The RV is not clearly visualized  but the size and function are probably normal. The RV FAC is 40%.  There are no significant valvular abnormalities.  The estimated PA systolic pressure is 29 mmHg.  IVC diameter <2.1 cm collapsing >50% with sniff suggests a normal RA pressure  of 3 mmHg.  This study was compared with the study from 01/11/2022. There is no  significant change in the biventricular size/function upon direct visual  comparison.    Coronary Angiogram 7/12/21  Conclusion    Severe distal Left main disease at trifurcation of LCx, LAD, and Ramus.  IABP inserted in the RFA.  CVC inserted in the RIJ.         Plan     Follow bedrest per protocol   Continued medical  management and lifestyle modifications for cardiovascular risk factor optimizations.   Arterial sheath removed from radial artery with TR band placement.   Admit to inpatient      Urgent cardiovascular surgery consultation for consideration for bypass surgery.  Heparin drip.  Aspirin 81 mg daily.  High intensity statin.  Echocardiogram.  Transfuse < 8 hgb  IABP 1:1     Recommendations    Surgical Therapy:   - Will obtain cardiovascular surgery consultation regarding coronary artery bypass grafting.     Abnormal Study:   - Acute Level 1 STEMI protocol procedure.     Patient has complex distal LM lesion.  Will place an IABP and obtain a surgical consultation for consideration for bypass surgery.     Coronary Findings    Diagnostic  Dominance: Right  Left Main   The vessel is large.   Mid LM to Ost LAD lesion is 85% stenosed.      Left Anterior Descending   The vessel is moderate in size.   Prox LAD to Mid LAD lesion is 60% stenosed.      First Diagonal Branch   The vessel is moderate in size.   1st Diag lesion is 60% stenosed.      Ramus Intermedius   The vessel is moderate in size.      Left Circumflex   The vessel is moderate in size.      First Obtuse Marginal Branch   The vessel is moderate in size.   1st Mrg lesion is 35% stenosed.      Second Obtuse Marginal Branch   The vessel is moderate in size and is angiographically normal.      Right Coronary Artery   The vessel is moderate in size.   Mid RCA lesion is 35% stenosed.      Right Posterior Descending Artery   The vessel is moderate in size. The vessel exhibits minimal luminal irregularities.      Right Posterior Atrioventricular Artery   The vessel is moderate in size. The vessel exhibits minimal luminal irregularities.      First Right Posterolateral Branch   The vessel is moderate in size. The vessel exhibits minimal luminal irregularities.      Second Right Posterolateral Branch   The vessel is small and is angiographically normal.      Third Right  Posterolateral Branch   The vessel is moderate in size. The vessel exhibits minimal luminal irregularities.                  Assessment and Plan:   Mr. 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  Most recent lipid panel 5/2023 with Total Chol 178,  and LDL unable to be calculated due to elevated TG.  - Eliquis 5 mg BID  - Rosuvastatin 20 mg daily  - Start omega-3 2000 mg BID  - Lipid panel in 3 months  - Avoid prepackaged and processed foods    # T2DM  On Jardiance. Followed by Endocrinology.    # HTN, goal <130/80  Blood pressures well-controlled.  - Lisinopril 5 mg daily  - Toprol 25 mg daily  - Procardia 60 mg daily      Follow up:  - Fasting lipids in 3 months  - in clinic in 6 months      SHANIQUE Landon, FNP-C  [facility] General Cardiology  Securely message with [employer]   Text page via [employer] Paging/Directory          Chart review time: 11 minutes  Visit time: 22 minutes  Chart completion/documentation: 5 minutes  Total time spent: 38 minutes

## 2024-04-30 NOTE — PROGRESS NOTES
"  Assessment & Plan     Cough, unspecified type  CXR suggests edema; no s/s fluid overload today but sees cardio in two days noted in results he can discuss there  - CBC with platelets and differential; Future  - XR Chest 2 Views; Future  - albuterol (PROAIR HFA/PROVENTIL HFA/VENTOLIN HFA) 108 (90 Base) MCG/ACT inhaler; Inhale 2 puffs into the lungs every 4 hours as needed for shortness of breath, wheezing or cough  - amoxicillin-clavulanate (AUGMENTIN) 875-125 MG tablet; Take 1 tablet by mouth 2 times daily  On steroids so avoid levaquin if can  31 minutes spent by me on the date of the encounter doing chart review, history and exam, documentation and further activities per the note    The longitudinal plan of care for the diagnosis(es)/condition(s) as documented were addressed during this visit. Due to the added complexity in care, I will continue to support Miller in the subsequent management and with ongoing continuity of care.      No follow-ups on file.    Subjective   Miller is a 59 year old, presenting for the following health issues:  Follow Up (Pt would like to discuss \"RSV struggles\" and dermatology concerns on right hip)      4/30/2024     8:23 AM   Additional Questions   Roomed by Luz Elena SERRATO   Accompanied by N/A     History of Present Illness       Reason for visit:  Regular check up and talk about my continued RSV struggles    He eats 4 or more servings of fruits and vegetables daily.He consumes 0 sweetened beverage(s) daily.He exercises with enough effort to increase his heart rate 60 or more minutes per day.  He exercises with enough effort to increase his heart rate 6 days per week.   He is taking medications regularly.   Here w/ ten days illness  No runny nose sinus pain sore throat  No fever  Is tired  Has cough w/ non bloody phlegm  No sob  Is immune suppressed  No GI sx  Past Medical History:   Diagnosis Date    Anemia 2013    Arthritis     BPH (benign prostatic hyperplasia)     CAD (coronary artery " disease) 04/01/2019    Cholelithiasis     Conductive hearing loss 08/16/2017    Depressive disorder 1986    Suffer effects throughout life    Gastroesophageal reflux disease 12/01/2014    HCC (hepatocellular carcinoma) (H) 01/22/2019    History of blood transfusion 2019    Had blood transfussion until Dec 2022    History of diabetic retinopathy 07/2018    HTN (hypertension) 11/20/2019    Hyperlipidemia     Liver cirrhosis secondary to ESTRADA (H)     Liver transplanted (H) 11/11/2019    Portal vein thrombosis 04/11/2019    SCC (squamous cell carcinoma) 02/15/2023    02/15/23:  Left temporal scalp    Type II diabetes mellitus (H)      Past Surgical History:   Procedure Laterality Date    ABDOMEN SURGERY  11/11/2019    Had Liver Transplant    BIOPSY  12/2022    Had biopsy done will have additional procedure 2/15/2023    BYPASS GRAFT ARTERY CORONARY N/A 07/14/2021    Procedure: median sternotomy, on cardiopulmonary bypass, CORONARY ARTERY BYPASS GRAFT (CABG) x2 with left greater saphenous vein endoscopic harvest and left internal mammery artery harvest;  Surgeon: Tom Zapata MD;  [facility]    CARDIAC SURGERY  7/2021    Heart Graph. and bypass surgery    CHOLECYSTECTOMY  11/11/2022    Removed with Liver Transplant    COLONOSCOPY      2015    COLONOSCOPY N/A 12/06/2019    Procedure: COLONOSCOPY, WITH POLYPECTOMY AND BIOPSY;  Surgeon: Adam Morton MD;  Location:  GI    CV CENTRAL VENOUS CATHETER PLACEMENT N/A 07/12/2021    Procedure: Central Venous Catheter Placement;  Surgeon: Fermin Polanco MD;  Location: [facility] CARDIAC CATH LAB    CV CORONARY ANGIOGRAM N/A 07/12/2021    Procedure: Coronary Angiogram;  Surgeon: Fermin Polanco MD;  Location: [facility] CARDIAC CATH LAB    CV HEART CATHETERIZATION WITH POSSIBLE INTERVENTION N/A 02/26/2019    Procedure: CORS;  Surgeon: Jagdish Hoyt MD;  Location: [facility] CARDIAC CATH LAB    CV INTRA AORTIC BALLOON N/A  07/12/2021    Procedure: Intra Aortic Balloon Pump Insertion;  Surgeon: Fermin Polanco MD;  Location:  HEART CARDIAC CATH LAB    ESOPHAGOSCOPY, GASTROSCOPY, DUODENOSCOPY (EGD), COMBINED N/A 11/17/2016    Procedure: COMBINED ESOPHAGOSCOPY, GASTROSCOPY, DUODENOSCOPY (EGD);  Surgeon: Santi Rosas MD;  Location:  GI    ESOPHAGOSCOPY, GASTROSCOPY, DUODENOSCOPY (EGD), COMBINED N/A 11/17/2017    Procedure: COMBINED ESOPHAGOSCOPY, GASTROSCOPY, DUODENOSCOPY (EGD);  EGD;  Surgeon: Santi Rosas MD;  Location:  GI    ESOPHAGOSCOPY, GASTROSCOPY, DUODENOSCOPY (EGD), COMBINED N/A 12/28/2018    Procedure: EGD;  Surgeon: Santi Rosas MD;  Location:  OR    ESOPHAGOSCOPY, GASTROSCOPY, DUODENOSCOPY (EGD), COMBINED N/A 12/06/2019    Procedure: ESOPHAGOGASTRODUODENOSCOPY, WITH BIOPSY;  Surgeon: Adam Morton MD;  Location:  GI    ESOPHAGOSCOPY, GASTROSCOPY, DUODENOSCOPY (EGD), COMBINED N/A 02/13/2020    Procedure: ESOPHAGOGASTRODUODENOSCOPY (EGD);  Surgeon: Santi Rosas MD;  Location:  GI    EXCISE MASS ABDOMEN N/A 07/13/2023    Procedure: Laparoscopic lysis of adhesions, laparoscopic resection of abdominal mass;  Surgeon: Ruiz Chu MD;  Location:  OR    HEAD & NECK SURGERY      12/2017 at [facility].     IMPLANT GOLD WEIGHT EYELID Right 11/16/2017    Procedure: IMPLANT WEIGHT EYELID;  Right Upper Eyelid Weight, right tarsal strip lower eyelid;  Surgeon: Milana Malave MD;  Location:  OR    IR CHEMO EMBOLIZATION  01/22/2019    KNEE SURGERY Left     ORTHOPEDIC SURGERY      PAROTIDECTOMY, RADICAL NECK DISSECTION Right 11/02/2017    Procedure: PAROTIDECTOMY, RADICAL NECK DISSECTION;  Right Superfacial Parotidectomy , Facial nerve repair. with [facility] facial nerve monitor.;  Surgeon: Asiya Morgan MD;  Location:  OR    PHACOEMULSIFICATION CLEAR CORNEA WITH STANDARD INTRAOCULAR LENS IMPLANT Right 01/24/2023    Procedure: PHACOEMULSIFICATION, COMPLEX  CATARACT, WITH INTRAOCULAR LENS IMPLANT WITH TRYPAN RIGHT EYE;  Surgeon: Enriqueta Martin MD;  Location: [facility] OR    PHACOEMULSIFICATION WITH STANDARD INTRAOCULAR LENS IMPLANT Left 2023    Procedure: PHACOEMULSIFICATION, COMPLEX CATARACT, WITH STANDARD INTRAOCULAR LENS IMPLANT INSERTION LEFT EYE;  Surgeon: Enriqueta Martin MD;  Location: [facility] OR    PICC INSERTION Left 2017    4fr SL BioFlo PICC, 44cm in the L basilic vein w/ tip in the low SVC    RETURN LIVER TRANSPLANT N/A 2019    Procedure: Exploratory laparotomy, hematoma evacuation, abdominal washout;  Surgeon: Александр Ramos MD;  [facility]    TRANSPLANT LIVER RECIPIENT  DONOR N/A 2019    Procedure: TRANSPLANT, LIVER, RECIPIENT,  DONOR;  Surgeon: Александр Ramos MD;  Location: [facility] OR    VASCULAR SURGERY       Current Outpatient Medications   Medication Sig Dispense Refill    acetaminophen (TYLENOL) 325 MG tablet TAKE 1 TABLET BY MOUTH EVERY SIX HOURS AS NEEDED FOR MILD PAIN 100 tablet 3    albuterol (PROAIR HFA/PROVENTIL HFA/VENTOLIN HFA) 108 (90 Base) MCG/ACT inhaler Inhale 2 puffs into the lungs every 4 hours as needed for shortness of breath, wheezing or cough 18 g 1    Alcohol Swabs (ALCOHOL PREP) 70 % PADS Use for glucose testing 4x daily  and insulin administration 4x daily. 400 each 1    ammonium lactate (AMLACTIN) 12 % external cream Apply topically daily as needed for dry skin (on feet) 140 g 5    amoxicillin-clavulanate (AUGMENTIN) 875-125 MG tablet Take 1 tablet by mouth 2 times daily 20 tablet 0    apixaban ANTICOAGULANT (ELIQUIS ANTICOAGULANT) 5 MG tablet Take 1 tablet (5 mg) by mouth 2 times daily 60 tablet 3    benzoyl peroxide 5 % external liquid Use topically in showers as a body wash 226 g 9    blood glucose (NO BRAND SPECIFIED) lancets standard Use to test blood sugar 1 times daily or as directed. 100 each 11    Continuous Blood Gluc Sensor (FREESTYLE CLAUDIA 2 SENSOR) MISC 1 each  See Admin Instructions Change every 14 days. 7 each 3    empagliflozin (JARDIANCE) 10 MG TABS tablet Take 1 tablet (10 mg) by mouth daily 30 tablet 11    insulin aspart (NOVOLOG PEN) 100 UNIT/ML pen Inject 5-10 Units Subcutaneous 4 times daily (with meals and nightly) 1unit : 8 g carb before meals.  Also add 1 unit : 50 mg/dl >180 before meals and at bedtime. Approx 45 units daily. 45 mL 1    insulin degludec (TRESIBA FLEXTOUCH) 100 UNIT/ML pen Inject 15 units subcutaneous once daily 45 mL 3    insulin pen needle (ULTICARE MICRO) 32G X 4 MM miscellaneous USE 5 PER  each 3    K-Phos-Neutral 155-852-130 MG tablet Take 1 tablet by mouth 4 times daily 120 tablet 1    ketoconazole (NIZORAL) 2 % external shampoo Apply thin layer topically to scalp in shower (leave on 5 min prior to rinse); may also use as a body wash 120 mL 11    lamiVUDine (EPIVIR) 100 MG tablet Take 1 tablet (100 mg) by mouth daily 90 tablet 3    lisinopril (ZESTRIL) 5 MG tablet Take 1 tablet (5 mg) by mouth daily 90 tablet 3    loperamide (IMODIUM) 2 MG capsule TAKE ONE TO TWO CAPSULES BY MOUTH FOUR TIMES A DAY AS NEEDED FOR DIARRHEA. 120 capsule 2    magnesium oxide (MAG-OX) 400 MG tablet Take 1 tablet (400 mg) by mouth daily 30 tablet 1    metoprolol succinate ER (TOPROL XL) 25 MG 24 hr tablet Take 1 tablet (25 mg) by mouth daily 45 tablet 1    Multiple Vitamin (DAILY-GLADIS MULTIVITAMIN) TABS TAKE 1 TABLET BY MOUTH ONCE DAILY 30 tablet 11    mycophenolate (GENERIC EQUIVALENT) 250 MG capsule TAKE TWO CAPSULES BY MOUTH EVERY 12 HOURS 120 capsule 11    NIFEdipine ER OSMOTIC (PROCARDIA XL) 60 MG 24 hr tablet TAKE 1 TABLET(60 MG) BY MOUTH TWICE DAILY 6 tablet 0    ondansetron (ZOFRAN ODT) 8 MG ODT tab Take 1 tablet (8 mg) by mouth every 8 hours as needed for nausea 15 tablet 3    pantoprazole (PROTONIX) 40 MG EC tablet TAKE 1 TABLET BY MOUTH ONCE DAILY BEFORE BREAKFAST 90 tablet 3    predniSONE (DELTASONE) 5 MG tablet TAKE ONE TABLET BY MOUTH ONCE  DAILY 30 tablet 11    rosuvastatin (CRESTOR) 20 MG tablet Take 1 tablet (20 mg) by mouth daily 90 tablet 3    sodium zirconium cyclosilicate (LOKELMA) 10 g PACK packet Take 10 gram 3x/day for 2 days then 10 grams daily. (Patient taking differently: Take 10 g by mouth daily Take 10 gram 3x/day for 2 days then 10 grams daily.) 30 packet 11    SORAfenib (NEXAVAR) 200 MG tablet Take 1 tablet (200 mg) by mouth 2 times daily On an empty stomach 1 hour before or 2 hours after a meal. 60 tablet 0    tamsulosin (FLOMAX) 0.4 MG capsule Take 1 capsule (0.4 mg) by mouth daily 90 capsule 3    vitamin D3 (CHOLECALCIFEROL) 50 mcg (2000 units) tablet TAKE 1 TABLET BY MOUTH ONCE DAILY 30 tablet 8    loperamide (IMODIUM A-D) 2 MG tablet Take 1-2 tablets (2-4 mg) by mouth 4 times daily as needed for diarrhea 120 tablet 3     Current Facility-Administered Medications   Medication Dose Route Frequency Provider Last Rate Last Admin    aflibercept (EYLEA) injection prefilled syringe 2 mg  2 mg Intravitreal Q28 Days Enriqueta Martin MD   2 mg at 05/31/23 1144    dexamethasone (OZURDEX) intravitreal implant 0.7 mg  0.7 mg Intravitreal Q2 Months Enriqueta Martin MD        dexamethasone (OZURDEX) intravitreal implant 0.7 mg  0.7 mg Intravitreal Q2 Months Enriqueta Martin MD [facility] intravitreal injection 6 mg  6 mg Intravitreal q28 days Enriqueta Martin MD [facility]a [facility] intravitreal injection 6 mg  6 mg Intravitreal q28 days Enriqueta Martin MD faricimab-brianoa (VABYSMO) intravitreal injection 6 mg  6 mg Intravitreal q28 days Enriqueta Martin MD   6 mg at 03/06/24 1001    lidocaine (PF) (XYLOCAINE) injection 1 mL  1 mL Ophthalmic Q2 Months Enriqueta Martin MD         Allergies   Allergen Reactions    Codeine Other (See Comments)     Cannot take due to liver  Cannot tolerate oral narcotics    Seasonal Allergies      Sneezing, coughing, runny  and itchy eyes     Family History   Problem Relation Age of Onset    Skin Cancer Mother     Cancer Mother         mastectomy    Diabetes Mother          3/2016    Cerebrovascular Disease Mother         Passed away in Feb of this year, 80 years old.    Thyroid Disease Mother     Depression Mother     Breast Cancer Mother     Obesity Mother         280 pounds  from this and complications from D    Pancreatic Cancer Father 60    Prostate Cancer Father         Currently in Remission    Macular Degeneration Father     Glaucoma Father     Skin Cancer Father     Coronary Artery Disease Father         Started in his 70's  at 88 in Octobe2023    Colon Cancer Father     Thyroid Disease Sister     Depression Sister     Asthma Sister     Sleep Apnea Brother     Colorectal Cancer Maternal Grandmother     Cancer Maternal Grandmother     Substance Abuse Maternal Grandmother         Alcohol    Prostate Cancer Maternal Grandfather     Substance Abuse Maternal Grandfather         Alcohol    Colorectal Cancer Paternal Grandmother     Asthma Sister         Had since birth    Liver Disease No family hx of     Melanoma No family hx of     Anesthesia Reaction No family hx of     Deep Vein Thrombosis (DVT) No family hx of      Social History     Socioeconomic History    Marital status:      Spouse name: Not on file    Number of children: Not on file    Years of education: Not on file    Highest education level: Bachelor's degree (e.g., BA, AB, BS)   Occupational History    Not on file   Tobacco Use    Smoking status: Former     Types: Dip, chew, snus or snuff     Passive exposure: Past    Smokeless tobacco: Former     Types: Chew     Quit date: 10/31/2017    Tobacco comments:     Quit [place] after Doctor told me in 2017 that if I didn't I would   Vaping Use    Vaping status: Never Used   Substance and Sexual Activity    Alcohol use: No     Comment: quit 1996    Drug use: No    Sexual activity: Not  Currently     Partners: Female     Birth control/protection: Condom   Other Topics Concern    Parent/sibling w/ CABG, MI or angioplasty before 65F 55M? No   Social History Narrative    Prior [facility], [facility]     Social Determinants of Health     Financial Resource Strain: Low Risk  (1/23/2024)    Financial Resource Strain     Within the past 12 months, have you or your family members you live with been unable to get utilities (heat, electricity) when it was really needed?: No   Food Insecurity: Low Risk  (1/23/2024)    Food Insecurity     Within the past 12 months, did you worry that your food would run out before you got money to buy more?: No     Within the past 12 months, did the food you bought just not last and you didn t have money to get more?: No   Transportation Needs: Low Risk  (1/23/2024)    Transportation Needs     Within the past 12 months, has lack of transportation kept you from medical appointments, getting your medicines, non-medical meetings or appointments, work, or from getting things that you need?: No   Physical Activity: Insufficiently Active (10/13/2020)    Exercise Vital Sign     Days of Exercise per Week: 1 day     Minutes of Exercise per Session: 60 min   Stress: Stress Concern Present (10/13/2020)    [employer] - Occupational Stress Questionnaire     Feeling of Stress : To some extent   Social Connections: Socially Isolated (10/13/2020)    Social Connection and Isolation Panel [NHANES]     Frequency of Communication with Friends and Family: Once a week     Frequency of Social Gatherings with Friends and Family: Once a week     Attends Hinduism Services: Never     Active Member of Clubs or Organizations: No     Attends Club or Organization Meetings: Never     Marital Status:    Interpersonal Safety: Low Risk  (4/30/2024)    Interpersonal Safety     Do you feel physically and emotionally safe where you currently live?: Yes     Within the past 12  "months, have you been hit, slapped, kicked or otherwise physically hurt by someone?: No     Within the past 12 months, have you been humiliated or emotionally abused in other ways by your partner or ex-partner?: No   Housing Stability: Low Risk  (1/23/2024)    Housing Stability     Do you have housing? : Yes     Are you worried about losing your housing?: No         Objective    /71 (BP Location: Right arm, Patient Position: Sitting, Cuff Size: Adult Regular)   Pulse 71   Ht 1.753 m (5' 9.02\")   Wt 72.8 kg (160 lb 9.6 oz)   SpO2 99%   BMI 23.71 kg/m    Body mass index is 23.71 kg/m .  Physical Exam   GENERAL: alert and no distress  NECK: no adenopathy, no asymmetry, masses, or scars  RESP: lungs clear to auscultation - no rales, rhonchi or wheezes  CV: regular rate and rhythm, normal S1 S2, no S3 or S4, no murmur, click or rub, no peripheral edema  MS: no gross musculoskeletal defects noted, no edema            Signed Electronically by: Lamin Ortiz MD    "

## 2024-05-02 ENCOUNTER — OFFICE VISIT (OUTPATIENT)
Dept: CARDIOLOGY | Facility: CLINIC | Age: 59
End: 2024-05-02
Attending: INTERNAL MEDICINE
Payer: MEDICARE

## 2024-05-02 VITALS
WEIGHT: 162.8 LBS | SYSTOLIC BLOOD PRESSURE: 134 MMHG | BODY MASS INDEX: 24.03 KG/M2 | HEART RATE: 80 BPM | DIASTOLIC BLOOD PRESSURE: 77 MMHG | OXYGEN SATURATION: 100 %

## 2024-05-02 DIAGNOSIS — Z95.1 S/P CABG (CORONARY ARTERY BYPASS GRAFT): ICD-10-CM

## 2024-05-02 DIAGNOSIS — I25.10 CORONARY ARTERY DISEASE INVOLVING NATIVE CORONARY ARTERY OF NATIVE HEART WITHOUT ANGINA PECTORIS: ICD-10-CM

## 2024-05-02 DIAGNOSIS — E11.9 WELL CONTROLLED TYPE 2 DIABETES MELLITUS (H): ICD-10-CM

## 2024-05-02 DIAGNOSIS — E78.5 HYPERLIPIDEMIA LDL GOAL <100: Primary | ICD-10-CM

## 2024-05-02 PROCEDURE — 99214 OFFICE O/P EST MOD 30 MIN: CPT

## 2024-05-02 PROCEDURE — G0463 HOSPITAL OUTPT CLINIC VISIT: HCPCS

## 2024-05-02 RX ORDER — OMEGA-3 FATTY ACIDS/FISH OIL 300-1000MG
2000 CAPSULE ORAL 2 TIMES DAILY
Qty: 120 CAPSULE | Refills: 10 | Status: SHIPPED | OUTPATIENT
Start: 2024-05-02

## 2024-05-02 ASSESSMENT — PAIN SCALES - GENERAL: PAINLEVEL: NO PAIN (0)

## 2024-05-02 NOTE — LETTER
5/2/2024      RE: Frandy Workman  [address] [facility] [address]       Dear Colleague,    Thank you for the opportunity to participate in the care of your patient, Frandy Workman, at the [facility] HEART CLINIC [place] at [facility]. Please see a copy of my visit note below.                      General Cardiology Clinic      HPI: Mr. Frandy Workman is a 59 year old male presenting to cardiology clinic today for annual follow up. 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     Follows with Dr. Ireland, last clinic visit 5/2/23. During this visit, he denied any symptoms, but has recently been put on lisinopril by nephrology. He also reported a weight gain of about 40 lbs due to decreased activity. Rosuvastatin was increased due to elevated TG.    Today in clinic, he denies any current chest pain, palpitations, dizziness, lower extremity edema. Patient says he has been sick for about 2 weeks, and is on antibiotics. He states that he has been less active during his illness and has felt short of breath going up the stairs, but feels like things are improving now over the past few days. Normally does 10,000-14,000 steps a day and has 4 flights of stairs at home that he says he typically has no issue with.    Blood pressures at home usually run 110-120's/60-70.      Current Cardiac Medications:   Eliquis 5 mg BID  Jardiance 10 mg daily  Lisinopril 5 mg daily  Toprol 25 mg daily  Procardia 60 mg daily  Rosuvastatin 20 mg daily      PAST MEDICAL HISTORY:  Past Medical History:   Diagnosis Date     Anemia 2013     Arthritis      BPH (benign prostatic hyperplasia)      CAD (coronary artery disease) 04/01/2019     Cholelithiasis      Conductive hearing loss 08/16/2017     Depressive disorder 1986    Suffer  effects throughout life     Gastroesophageal reflux disease 12/01/2014     HCC (hepatocellular carcinoma) (H) 01/22/2019     History of blood transfusion 2019    Had blood transfussion until Dec 2022     History of diabetic retinopathy 07/2018     HTN (hypertension) 11/20/2019     Hyperlipidemia      Liver cirrhosis secondary to ESTRADA (H)      Liver transplanted (H) 11/11/2019     Portal vein thrombosis 04/11/2019     SCC (squamous cell carcinoma) 02/15/2023    02/15/23:  Left temporal scalp     Type II diabetes mellitus (H)        CURRENT MEDICATIONS:  Current Outpatient Medications   Medication Sig Dispense Refill     acetaminophen (TYLENOL) 325 MG tablet TAKE 1 TABLET BY MOUTH EVERY SIX HOURS AS NEEDED FOR MILD PAIN 100 tablet 3     albuterol (PROAIR HFA/PROVENTIL HFA/VENTOLIN HFA) 108 (90 Base) MCG/ACT inhaler Inhale 2 puffs into the lungs every 4 hours as needed for shortness of breath, wheezing or cough 18 g 1     Alcohol Swabs (ALCOHOL PREP) 70 % PADS Use for glucose testing 4x daily  and insulin administration 4x daily. 400 each 1     ammonium lactate (AMLACTIN) 12 % external cream Apply topically daily as needed for dry skin (on feet) 140 g 5     amoxicillin-clavulanate (AUGMENTIN) 875-125 MG tablet Take 1 tablet by mouth 2 times daily 20 tablet 0     apixaban ANTICOAGULANT (ELIQUIS ANTICOAGULANT) 5 MG tablet Take 1 tablet (5 mg) by mouth 2 times daily 60 tablet 3     benzoyl peroxide 5 % external liquid Use topically in showers as a body wash 226 g 9     blood glucose (NO BRAND SPECIFIED) lancets standard Use to test blood sugar 1 times daily or as directed. 100 each 11     Continuous Blood Gluc Sensor (FREESTYLE CLAUDIA 2 SENSOR) [facility] 1 each See Admin Instructions Change every 14 days. 7 each 3     empagliflozin (JARDIANCE) 10 MG TABS tablet Take 1 tablet (10 mg) by mouth daily 30 tablet 11     insulin aspart (NOVOLOG PEN) 100 UNIT/ML pen Inject 5-10 Units Subcutaneous 4 times daily (with meals and nightly)  1unit : 8 g carb before meals.  Also add 1 unit : 50 mg/dl >180 before meals and at bedtime. Approx 45 units daily. 45 mL 1     insulin degludec (TRESIBA FLEXTOUCH) 100 UNIT/ML pen Inject 15 units subcutaneous once daily 45 mL 3     insulin pen needle (ULTICARE MICRO) 32G X 4 MM miscellaneous USE 5 PER  each 3     K-Phos-Neutral 155-852-130 MG tablet Take 1 tablet by mouth 4 times daily 120 tablet 1     ketoconazole (NIZORAL) 2 % external shampoo Apply thin layer topically to scalp in shower (leave on 5 min prior to rinse); may also use as a body wash 120 mL 11     lamiVUDine (EPIVIR) 100 MG tablet Take 1 tablet (100 mg) by mouth daily 90 tablet 3     lisinopril (ZESTRIL) 5 MG tablet Take 1 tablet (5 mg) by mouth daily 90 tablet 3     loperamide (IMODIUM) 2 MG capsule TAKE ONE TO TWO CAPSULES BY MOUTH FOUR TIMES A DAY AS NEEDED FOR DIARRHEA. 120 capsule 2     magnesium oxide (MAG-OX) 400 MG tablet Take 1 tablet (400 mg) by mouth daily 30 tablet 1     metoprolol succinate ER (TOPROL XL) 25 MG 24 hr tablet Take 1 tablet (25 mg) by mouth daily 45 tablet 1     Multiple Vitamin (DAILY-GLADIS MULTIVITAMIN) TABS TAKE 1 TABLET BY MOUTH ONCE DAILY 30 tablet 11     mycophenolate (GENERIC EQUIVALENT) 250 MG capsule TAKE TWO CAPSULES BY MOUTH EVERY 12 HOURS 120 capsule 11     NIFEdipine ER OSMOTIC (PROCARDIA XL) 60 MG 24 hr tablet TAKE 1 TABLET(60 MG) BY MOUTH TWICE DAILY 6 tablet 0     omega 3 1000 MG CAPS Take 2,000 mg by mouth 2 times daily 120 capsule 10     ondansetron (ZOFRAN ODT) 8 MG ODT tab Take 1 tablet (8 mg) by mouth every 8 hours as needed for nausea 15 tablet 3     pantoprazole (PROTONIX) 40 MG EC tablet TAKE 1 TABLET BY MOUTH ONCE DAILY BEFORE BREAKFAST 90 tablet 3     predniSONE (DELTASONE) 5 MG tablet TAKE ONE TABLET BY MOUTH ONCE DAILY 30 tablet 11     rosuvastatin (CRESTOR) 20 MG tablet Take 1 tablet (20 mg) by mouth daily 90 tablet 3     sodium zirconium cyclosilicate (LOKELMA) 10 g PACK packet Take 10  gram 3x/day for 2 days then 10 grams daily. (Patient taking differently: Take 10 g by mouth daily Take 10 gram 3x/day for 2 days then 10 grams daily.) 30 packet 11     SORAfenib (NEXAVAR) 200 MG tablet Take 1 tablet (200 mg) by mouth 2 times daily On an empty stomach 1 hour before or 2 hours after a meal. 60 tablet 0     tamsulosin (FLOMAX) 0.4 MG capsule Take 1 capsule (0.4 mg) by mouth daily 90 capsule 3     vitamin D3 (CHOLECALCIFEROL) 50 mcg (2000 units) tablet TAKE 1 TABLET BY MOUTH ONCE DAILY 30 tablet 8     loperamide (IMODIUM A-D) 2 MG tablet Take 1-2 tablets (2-4 mg) by mouth 4 times daily as needed for diarrhea 120 tablet 3       PAST SURGICAL HISTORY:  Past Surgical History:   Procedure Laterality Date     ABDOMEN SURGERY  11/11/2019    Had Liver Transplant     BIOPSY  12/2022    Had biopsy done will have additional procedure 2/15/2023     BYPASS GRAFT ARTERY CORONARY N/A 07/14/2021    Procedure: median sternotomy, on cardiopulmonary bypass, CORONARY ARTERY BYPASS GRAFT (CABG) x2 with left greater saphenous vein endoscopic harvest and left internal mammery artery harvest;  Surgeon: Tom Zapata MD;  Location:  OR     CARDIAC SURGERY  7/2021    Heart Graph. and bypass surgery     CHOLECYSTECTOMY  11/11/2022    Removed with Liver Transplant     COLONOSCOPY      2015     COLONOSCOPY N/A 12/06/2019    Procedure: COLONOSCOPY, WITH POLYPECTOMY AND BIOPSY;  Surgeon: Adam Morton MD;  Location:  GI     CV CENTRAL VENOUS CATHETER PLACEMENT N/A 07/12/2021    Procedure: Central Venous Catheter Placement;  Surgeon: Fermin Polanco MD;  Location:  HEART CARDIAC CATH LAB     CV CORONARY ANGIOGRAM N/A 07/12/2021    Procedure: Coronary Angiogram;  Surgeon: Fermin Polanco MD;  Location:  HEART CARDIAC CATH LAB     CV HEART CATHETERIZATION WITH POSSIBLE INTERVENTION N/A 02/26/2019    Procedure: CORS;  Surgeon: Jagdish Hoyt MD;  Location: [facility] CARDIAC CATH  LAB     CV INTRA AORTIC BALLOON N/A 07/12/2021    Procedure: Intra Aortic Balloon Pump Insertion;  Surgeon: Fermin Polanco MD;  Location:  HEART CARDIAC CATH LAB     ESOPHAGOSCOPY, GASTROSCOPY, DUODENOSCOPY (EGD), COMBINED N/A 11/17/2016    Procedure: COMBINED ESOPHAGOSCOPY, GASTROSCOPY, DUODENOSCOPY (EGD);  Surgeon: Santi Rosas MD;  Location:  GI     ESOPHAGOSCOPY, GASTROSCOPY, DUODENOSCOPY (EGD), COMBINED N/A 11/17/2017    Procedure: COMBINED ESOPHAGOSCOPY, GASTROSCOPY, DUODENOSCOPY (EGD);  EGD;  Surgeon: Santi Rosas MD;  Location:  GI     ESOPHAGOSCOPY, GASTROSCOPY, DUODENOSCOPY (EGD), COMBINED N/A 12/28/2018    Procedure: EGD;  Surgeon: Santi Rosas MD;  Location:  OR     ESOPHAGOSCOPY, GASTROSCOPY, DUODENOSCOPY (EGD), COMBINED N/A 12/06/2019    Procedure: ESOPHAGOGASTRODUODENOSCOPY, WITH BIOPSY;  Surgeon: Adam Morton MD;  Location:  GI     ESOPHAGOSCOPY, GASTROSCOPY, DUODENOSCOPY (EGD), COMBINED N/A 02/13/2020    Procedure: ESOPHAGOGASTRODUODENOSCOPY (EGD);  Surgeon: Santi Rosas MD;  Location:  GI     EXCISE MASS ABDOMEN N/A 07/13/2023    Procedure: Laparoscopic lysis of adhesions, laparoscopic resection of abdominal mass;  Surgeon: Ruiz Chu MD;  Location:  OR     HEAD & NECK SURGERY      12/2017 at [facility].      IMPLANT GOLD WEIGHT EYELID Right 11/16/2017    Procedure: IMPLANT WEIGHT EYELID;  Right Upper Eyelid Weight, right tarsal strip lower eyelid;  Surgeon: Milana Malave MD;  Location:  OR     IR CHEMO EMBOLIZATION  01/22/2019     KNEE SURGERY Left      ORTHOPEDIC SURGERY       PAROTIDECTOMY, RADICAL NECK DISSECTION Right 11/02/2017    Procedure: PAROTIDECTOMY, RADICAL NECK DISSECTION;  Right Superfacial Parotidectomy , Facial nerve repair. with [facility] facial nerve monitor.;  Surgeon: Asiya Morgan MD;  Location:  OR     PHACOEMULSIFICATION CLEAR CORNEA WITH STANDARD INTRAOCULAR LENS IMPLANT Right  2023    Procedure: PHACOEMULSIFICATION, COMPLEX CATARACT, WITH INTRAOCULAR LENS IMPLANT WITH TRYPAN RIGHT EYE;  Surgeon: Enriqueta Martin MD;  Location: [facility] OR     PHACOEMULSIFICATION WITH STANDARD INTRAOCULAR LENS IMPLANT Left 2023    Procedure: PHACOEMULSIFICATION, COMPLEX CATARACT, WITH STANDARD INTRAOCULAR LENS IMPLANT INSERTION LEFT EYE;  Surgeon: Enriqueta Martin MD;  Location: [facility] OR     PICC INSERTION Left 2017    4fr SL BioFlo PICC, 44cm in the L basilic vein w/ tip in the low SVC     RETURN LIVER TRANSPLANT N/A 2019    Procedure: Exploratory laparotomy, hematoma evacuation, abdominal washout;  Surgeon: Александр Ramos MD;  Location: [facility] OR     TRANSPLANT LIVER RECIPIENT  DONOR N/A 2019    Procedure: TRANSPLANT, LIVER, RECIPIENT,  DONOR;  Surgeon: Александр Ramos MD;  Location: [facility] OR     VASCULAR SURGERY         ALLERGIES:  Allergies   Allergen Reactions     Codeine Other (See Comments)     Cannot take due to liver  Cannot tolerate oral narcotics     Seasonal Allergies      Sneezing, coughing, runny and itchy eyes       FAMILY HISTORY:  Family History   Problem Relation Age of Onset     Skin Cancer Mother      Cancer Mother         mastectomy     Diabetes Mother          3/2016     Cerebrovascular Disease Mother         Passed away in Feb of this year, 80 years old.     Thyroid Disease Mother      Depression Mother      Breast Cancer Mother      Obesity Mother         280 pounds  from this and complications from D     Pancreatic Cancer Father 60     Prostate Cancer Father         Currently in Remission     Macular Degeneration Father      Glaucoma Father      Skin Cancer Father      Coronary Artery Disease Father         Started in his 70's  at 88 in Octobe2023     Colon Cancer Father      Thyroid Disease Sister      Depression Sister      Asthma Sister      Sleep Apnea Brother      Colorectal Cancer Maternal Grandmother       Cancer Maternal Grandmother      Substance Abuse Maternal Grandmother         Alcohol     Prostate Cancer Maternal Grandfather      Substance Abuse Maternal Grandfather         Alcohol     Colorectal Cancer Paternal Grandmother      Asthma Sister         Had since birth     Liver Disease No family hx of      Melanoma No family hx of      Anesthesia Reaction No family hx of      Deep Vein Thrombosis (DVT) No family hx of        SOCIAL HISTORY:  Social History     Tobacco Use     Smoking status: Former     Types: Dip, chew, snus or snuff     Passive exposure: Past     Smokeless tobacco: Former     Types: Chew     Quit date: 10/31/2017     Tobacco comments:     Quit [place] after Doctor told me in 2017 that if I didn't I would   Vaping Use     Vaping status: Never Used   Substance Use Topics     Alcohol use: No     Comment: quit Sept. 1996     Drug use: No       ROS:   Constitutional: No fever, chills, or sweats.   Cardiovascular: As per HPI.       Exam:  /77 (BP Location: Right arm, Patient Position: Sitting, Cuff Size: Adult Regular)   Pulse 80   Wt 73.8 kg (162 lb 12.8 oz)   SpO2 100%   BMI 24.03 kg/m     GENERAL: alert and no distress  HEENT: no icterus, no central cyanosis  LYMPH/NECK: no adenopathy, no asymmetry  RESPIRATORY: lungs clear to auscultation - no rales, rhonchi or wheezes, no use of accessory muscles, respirations are unlabored, normal respiratory rate  CARDIOVASCULAR: RRR, +S1S2, no murmur, rub, or gallop.   GI: soft, non tender  EXTREMITIES: peripheral pulses normal, no peripheral edema  NEURO: alert, normal speech and affect  SKIN: no jaundice, no rashes or lesions      Labs:  Lab Results   Component Value Date    WBC 6.9 04/30/2024    WBC 4.4 06/28/2021    RBC 3.47 (L) 04/30/2024    RBC 2.35 (L) 06/28/2021    HGB 10.5 (L) 04/30/2024    HGB 7.3 (L) 06/28/2021    HCT 33.2 (L) 04/30/2024    HCT 22.6 (L) 06/28/2021    MCV 96 04/30/2024    MCV 96 06/28/2021    MCH 30.3 04/30/2024     MCH 31.1 06/28/2021    MCHC 31.6 04/30/2024    MCHC 32.3 06/28/2021    RDW 14.6 04/30/2024    RDW 15.1 (H) 06/28/2021     04/30/2024     (L) 06/28/2021     Lab Results   Component Value Date     04/17/2024     06/28/2021    POTASSIUM 5.4 (H) 04/17/2024    POTASSIUM 7.7 (HH) 08/10/2023    POTASSIUM 4.7 07/20/2022    POTASSIUM 4.6 06/28/2021    CHLORIDE 105 04/17/2024    CHLORIDE 105 07/20/2022    CHLORIDE 107 06/28/2021    CO2 22 04/17/2024    CO2 26 07/20/2022    CO2 25 06/28/2021    ANIONGAP 11 04/17/2024    ANIONGAP 7 07/20/2022    ANIONGAP 5 06/28/2021     (H) 04/17/2024     (H) 08/16/2023     (H) 07/20/2022     (H) 06/28/2021    BUN 23.2 (H) 04/17/2024    BUN 32 (H) 07/20/2022    BUN 33 (H) 06/28/2021    CR 1.57 (H) 04/17/2024    CR 1.62 (H) 06/28/2021    GFRESTIMATED 50 (L) 04/17/2024    GFRESTIMATED 46 (L) 01/30/2024    GFRESTIMATED 46 (L) 06/28/2021    GFRESTBLACK 54 (L) 06/28/2021    DEBORAH 8.8 04/17/2024    DEBORAH 9.1 06/28/2021      Lab Results   Component Value Date    INR 1.16 (H) 08/10/2023    INR 1.09 01/24/2020     Lab Results   Component Value Date    CHOL 178 07/28/2023    CHOL 146 09/25/2020     Lab Results   Component Value Date    HDL 34 07/28/2023    HDL 39 09/25/2020     Lab Results   Component Value Date    LDL  07/28/2023      Comment:      Cannot estimate LDL when triglyceride exceeds 400 mg/dL    LDL 61 09/25/2020     Lab Results   Component Value Date    TRIG 405 07/28/2023    TRIG 228 09/25/2020       Diagnostics  EKG 8/24/23      Echocardiogram 3/15/22  Left ventricular function is normal.The ejection fraction is > 65%. Abnormal  non-specific septal motion is present.  Global right ventricular function is normal. The RV is not clearly visualized  but the size and function are probably normal. The RV FAC is 40%.  There are no significant valvular abnormalities.  The estimated PA systolic pressure is 29 mmHg.  IVC diameter <2.1 cm collapsing  >50% with sniff suggests a normal RA pressure  of 3 mmHg.  This study was compared with the study from 01/11/2022. There is no  significant change in the biventricular size/function upon direct visual  comparison.    Coronary Angiogram 7/12/21  Conclusion    Severe distal Left main disease at trifurcation of LCx, LAD, and Ramus.  IABP inserted in the RFA.  CVC inserted in the RIJ.         Plan      Follow bedrest per protocol    Continued medical management and lifestyle modifications for cardiovascular risk factor optimizations.    Arterial sheath removed from radial artery with TR band placement.    Admit to inpatient      Urgent cardiovascular surgery consultation for consideration for bypass surgery.  Heparin drip.  Aspirin 81 mg daily.  High intensity statin.  Echocardiogram.  Transfuse < 8 hgb  IABP 1:1     Recommendations    Surgical Therapy:   - Will obtain cardiovascular surgery consultation regarding coronary artery bypass grafting.     Abnormal Study:   - Acute Level 1 STEMI protocol procedure.     Patient has complex distal LM lesion.  Will place an IABP and obtain a surgical consultation for consideration for bypass surgery.     Coronary Findings    Diagnostic  Dominance: Right  Left Main   The vessel is large.   Mid LM to Ost LAD lesion is 85% stenosed.      Left Anterior Descending   The vessel is moderate in size.   Prox LAD to Mid LAD lesion is 60% stenosed.      First Diagonal Branch   The vessel is moderate in size.   1st Diag lesion is 60% stenosed.      Ramus Intermedius   The vessel is moderate in size.      Left Circumflex   The vessel is moderate in size.      First Obtuse Marginal Branch   The vessel is moderate in size.   1st Mrg lesion is 35% stenosed.      Second Obtuse Marginal Branch   The vessel is moderate in size and is angiographically normal.      Right Coronary Artery   The vessel is moderate in size.   Mid RCA lesion is 35% stenosed.      Right Posterior Descending Artery   The  vessel is moderate in size. The vessel exhibits minimal luminal irregularities.      Right Posterior Atrioventricular Artery   The vessel is moderate in size. The vessel exhibits minimal luminal irregularities.      First Right Posterolateral Branch   The vessel is moderate in size. The vessel exhibits minimal luminal irregularities.      Second Right Posterolateral Branch   The vessel is small and is angiographically normal.      Third Right Posterolateral Branch   The vessel is moderate in size. The vessel exhibits minimal luminal irregularities.                  Assessment and Plan:   Mr. 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  Most recent lipid panel 5/2023 with Total Chol 178,  and LDL unable to be calculated due to elevated TG.  - Eliquis 5 mg BID  - Rosuvastatin 20 mg daily  - Start omega-3 2000 mg BID  - Lipid panel in 3 months  - Avoid prepackaged and processed foods    # T2DM  On Jardiance. Followed by Endocrinology.    # HTN, goal <130/80  Blood pressures well-controlled.  - Lisinopril 5 mg daily  - Toprol 25 mg daily  - Procardia 60 mg daily      Follow up:  - Fasting lipids in 3 months  - in clinic in 6 months      SHANIQUE Landon, LISETHP-C  [facility] General Cardiology  Securely message with [employer]   Text page via [employer] Paging/Directory          Chart review time: 11 minutes  Visit time: 22 minutes  Chart completion/documentation: 5 minutes  Total time spent: 38 minutes       Please do not hesitate to contact me if you have any questions/concerns.     Sincerely,     SHANIQUE Landon CNP

## 2024-05-02 NOTE — NURSING NOTE
Chief Complaint   Patient presents with    Follow Up     Return cardiology, 60 yo male with CAD, benign essential hypertenstion, dyslipidemia NSTEMI presents for annual follow up       Vitals were taken, medications reviewed.    Graciela Garcia, EMT   9:56 AM

## 2024-05-02 NOTE — PATIENT INSTRUCTIONS
You were seen today in the Cardiovascular Clinic at the [facility] by:       Lisa LEO     Your visit summary and instructions are as follows:    Fasting Lipid Panel in 3 months  Start Omega-3s  Continue to work toward the recommendation of 150 minutes of moderate intensity exercise/week and maintain a healthy lifestyle (avoid illicit drugs, smoking and moderate alcohol consumption)      Return to cardiology clinic in 6 months or sooner as needed.     Thank you for your visit today!     Please MyChart message me or call my nurse Gay if you have any questions or concerns.      During Business Hours:  [phone], option # 1 (University) then option # 4 (medical questions) and ask to speak with my nurse.     After hours, weekends or holidays:   [phone], Option #4  Ask to speak to the On-Call Cardiologist. Inform them you are a cardiology patient at the [facility].

## 2024-05-06 ENCOUNTER — LAB (OUTPATIENT)
Dept: LAB | Facility: CLINIC | Age: 59
End: 2024-05-06
Payer: MEDICARE

## 2024-05-06 ENCOUNTER — ANCILLARY PROCEDURE (OUTPATIENT)
Dept: CT IMAGING | Facility: CLINIC | Age: 59
End: 2024-05-06
Attending: INTERNAL MEDICINE
Payer: MEDICARE

## 2024-05-06 DIAGNOSIS — D63.1 ANEMIA OF CHRONIC RENAL FAILURE, STAGE 3B (H): ICD-10-CM

## 2024-05-06 DIAGNOSIS — Z79.899 ENCOUNTER FOR LONG-TERM (CURRENT) USE OF MEDICATIONS: ICD-10-CM

## 2024-05-06 DIAGNOSIS — C78.6 MALIGNANT NEOPLASM METASTATIC TO PERITONEUM (H): ICD-10-CM

## 2024-05-06 DIAGNOSIS — C22.0 HCC (HEPATOCELLULAR CARCINOMA) (H): ICD-10-CM

## 2024-05-06 DIAGNOSIS — N18.32 ANEMIA OF CHRONIC RENAL FAILURE, STAGE 3B (H): ICD-10-CM

## 2024-05-06 DIAGNOSIS — N18.32 STAGE 3B CHRONIC KIDNEY DISEASE (H): ICD-10-CM

## 2024-05-06 DIAGNOSIS — Z94.4 LIVER TRANSPLANT RECIPIENT (H): ICD-10-CM

## 2024-05-06 DIAGNOSIS — E11.3293 TYPE 2 DIABETES MELLITUS WITH MILD NONPROLIFERATIVE RETINOPATHY OF BOTH EYES WITHOUT MACULAR EDEMA, UNSPECIFIED WHETHER LONG TERM INSULIN USE (H): ICD-10-CM

## 2024-05-06 DIAGNOSIS — C22.0 HCC (HEPATOCELLULAR CARCINOMA) (H): Primary | ICD-10-CM

## 2024-05-06 DIAGNOSIS — Z94.4 LIVER REPLACED BY TRANSPLANT (H): ICD-10-CM

## 2024-05-06 LAB
AFP SERPL-MCNC: 5.7 NG/ML
ALBUMIN SERPL BCG-MCNC: 4.3 G/DL (ref 3.5–5.2)
ALP SERPL-CCNC: 112 U/L (ref 40–150)
ALT SERPL W P-5'-P-CCNC: 22 U/L (ref 0–70)
ANION GAP SERPL CALCULATED.3IONS-SCNC: 12 MMOL/L (ref 7–15)
AST SERPL W P-5'-P-CCNC: 19 U/L (ref 0–45)
BASOPHILS # BLD AUTO: 0 10E3/UL (ref 0–0.2)
BASOPHILS NFR BLD AUTO: 0 %
BILIRUB DIRECT SERPL-MCNC: <0.2 MG/DL (ref 0–0.3)
BILIRUB SERPL-MCNC: 0.3 MG/DL
BUN SERPL-MCNC: 31.9 MG/DL (ref 8–23)
CALCIUM SERPL-MCNC: 9.2 MG/DL (ref 8.8–10.2)
CHLORIDE SERPL-SCNC: 105 MMOL/L (ref 98–107)
CREAT SERPL-MCNC: 1.66 MG/DL (ref 0.67–1.17)
DEPRECATED HCO3 PLAS-SCNC: 21 MMOL/L (ref 22–29)
EGFRCR SERPLBLD CKD-EPI 2021: 47 ML/MIN/1.73M2
EOSINOPHIL # BLD AUTO: 0.1 10E3/UL (ref 0–0.7)
EOSINOPHIL NFR BLD AUTO: 2 %
ERYTHROCYTE [DISTWIDTH] IN BLOOD BY AUTOMATED COUNT: 15.5 % (ref 10–15)
GLUCOSE SERPL-MCNC: 205 MG/DL (ref 70–99)
HCT VFR BLD AUTO: 32.6 % (ref 40–53)
HGB BLD-MCNC: 10.2 G/DL (ref 13.3–17.7)
IMM GRANULOCYTES # BLD: 0.1 10E3/UL
IMM GRANULOCYTES NFR BLD: 1 %
LYMPHOCYTES # BLD AUTO: 0.7 10E3/UL (ref 0.8–5.3)
LYMPHOCYTES NFR BLD AUTO: 15 %
MAGNESIUM SERPL-MCNC: 2.5 MG/DL (ref 1.7–2.3)
MCH RBC QN AUTO: 30.4 PG (ref 26.5–33)
MCHC RBC AUTO-ENTMCNC: 31.3 G/DL (ref 31.5–36.5)
MCV RBC AUTO: 97 FL (ref 78–100)
MONOCYTES # BLD AUTO: 0.3 10E3/UL (ref 0–1.3)
MONOCYTES NFR BLD AUTO: 8 %
NEUTROPHILS # BLD AUTO: 3.3 10E3/UL (ref 1.6–8.3)
NEUTROPHILS NFR BLD AUTO: 74 %
NRBC # BLD AUTO: 0 10E3/UL
NRBC BLD AUTO-RTO: 0 /100
PHOSPHATE SERPL-MCNC: 3.6 MG/DL (ref 2.5–4.5)
PLATELET # BLD AUTO: 175 10E3/UL (ref 150–450)
POTASSIUM SERPL-SCNC: 5.1 MMOL/L (ref 3.4–5.3)
PROT SERPL-MCNC: 7.1 G/DL (ref 6.4–8.3)
RBC # BLD AUTO: 3.36 10E6/UL (ref 4.4–5.9)
SODIUM SERPL-SCNC: 138 MMOL/L (ref 135–145)
WBC # BLD AUTO: 4.5 10E3/UL (ref 4–11)

## 2024-05-06 PROCEDURE — 85025 COMPLETE CBC W/AUTO DIFF WBC: CPT | Performed by: PATHOLOGY

## 2024-05-06 PROCEDURE — 82105 ALPHA-FETOPROTEIN SERUM: CPT | Performed by: INTERNAL MEDICINE

## 2024-05-06 PROCEDURE — 82248 BILIRUBIN DIRECT: CPT | Performed by: PATHOLOGY

## 2024-05-06 PROCEDURE — 74178 CT ABD&PLV WO CNTR FLWD CNTR: CPT | Mod: MG | Performed by: RADIOLOGY

## 2024-05-06 PROCEDURE — 36415 COLL VENOUS BLD VENIPUNCTURE: CPT | Performed by: PATHOLOGY

## 2024-05-06 PROCEDURE — 80053 COMPREHEN METABOLIC PANEL: CPT | Performed by: PATHOLOGY

## 2024-05-06 PROCEDURE — 83735 ASSAY OF MAGNESIUM: CPT | Performed by: PATHOLOGY

## 2024-05-06 PROCEDURE — 99000 SPECIMEN HANDLING OFFICE-LAB: CPT | Performed by: PATHOLOGY

## 2024-05-06 PROCEDURE — 84100 ASSAY OF PHOSPHORUS: CPT | Performed by: PATHOLOGY

## 2024-05-06 PROCEDURE — G1010 CDSM STANSON: HCPCS | Mod: GC | Performed by: RADIOLOGY

## 2024-05-06 PROCEDURE — 71260 CT THORAX DX C+: CPT | Mod: MG | Performed by: RADIOLOGY

## 2024-05-06 RX ORDER — IOPAMIDOL 755 MG/ML
85 INJECTION, SOLUTION INTRAVASCULAR ONCE
Status: COMPLETED | OUTPATIENT
Start: 2024-05-06 | End: 2024-05-06

## 2024-05-06 RX ADMIN — IOPAMIDOL 85 ML: 755 INJECTION, SOLUTION INTRAVASCULAR at 08:54

## 2024-05-06 NOTE — DISCHARGE INSTRUCTIONS

## 2024-05-07 ENCOUNTER — DOCUMENTATION ONLY (OUTPATIENT)
Dept: ONCOLOGY | Facility: CLINIC | Age: 59
End: 2024-05-07
Payer: MEDICARE

## 2024-05-07 NOTE — PROGRESS NOTES
Oral Chemotherapy Monitoring Program  Lab Follow Up    Reviewed lab results from 5/6/24.        12/27/2023    10:00 AM 1/17/2024     9:00 AM 2/13/2024     2:00 PM 3/13/2024    10:00 AM 4/11/2024    12:00 PM 4/17/2024     3:00 PM 5/7/2024     6:00 PM   ORAL CHEMOTHERAPY   Assessment Type Refill Refill Refill Lab Monitoring Refill Lab Monitoring Lab Monitoring   Diagnosis Code Hepatocellular Cancer Hepatocellular Cancer Hepatocellular Cancer Hepatocellular Cancer Hepatocellular Cancer Hepatocellular Cancer Hepatocellular Cancer   Providers Dr. Cherelle Villarreal   [facility]   Is this patient followed by the [facility] OC team? No No No No No No No   Drug Name Nexavar (sorafenib) Nexavar (sorafenib) Nexavar (sorafenib) Nexavar (sorafenib) Nexavar (sorafenib) Nexavar (sorafenib) Nexavar (sorafenib)   Dose 200 mg 200 mg 200 mg 200 mg 200 mg 200 mg 200 mg   Current Schedule BID BID BID BID BID BID BID   Cycle Details Continuous Continuous Continuous Continuous Continuous Continuous Continuous       Labs:  _  Result Component Current Result Ref Range   Sodium 138 (5/6/2024) 135 - 145 mmol/L     _  Result Component Current Result Ref Range   Potassium 5.1 (5/6/2024) 3.4 - 5.3 mmol/L     _  Result Component Current Result Ref Range   Calcium 9.2 (5/6/2024) 8.8 - 10.2 mg/dL     _  Result Component Current Result Ref Range   Magnesium 2.5 (H) (5/6/2024) 1.7 - 2.3 mg/dL     _  Result Component Current Result Ref Range   Phosphorus 3.6 (5/6/2024) 2.5 - 4.5 mg/dL     _  Result Component Current Result Ref Range   Albumin 4.3 (5/6/2024) 3.5 - 5.2 g/dL     _  Result Component Current Result Ref Range   Urea Nitrogen 31.9 (H) (5/6/2024) 8.0 - 23.0 mg/dL     _  Result Component Current Result Ref Range   Creatinine 1.66 (H) (5/6/2024) 0.67 - 1.17 mg/dL     _  Result Component Current Result Ref Range   AST 19 (5/6/2024)  0 - 45 U/L     _  Result Component Current Result Ref Range   ALT 22 (5/6/2024) 0 - 70 U/L     _  Result Component Current Result Ref Range   Bilirubin Total 0.3 (5/6/2024) <=1.2 mg/dL     _  Result Component Current Result Ref Range   WBC Count 4.5 (5/6/2024) 4.0 - 11.0 10e3/uL     _  Result Component Current Result Ref Range   Hemoglobin 10.2 (L) (5/6/2024) 13.3 - 17.7 g/dL     _  Result Component Current Result Ref Range   Platelet Count 175 (5/6/2024) 150 - 450 10e3/uL     No results found for ANC within last 30 days.     _  Result Component Current Result Ref Range   Absolute Neutrophils 3.3 (5/6/2024) 1.6 - 8.3 10e3/uL        Assessment & Plan:  Results are concerning for slight hypermagnesemia. Miller has been on a magnesium supplement prescribed by Dr. Villarreal, will send him a message to see if he would like to make any adjustments. Will send Miller a MyC message.    Follow-Up:  IB response from Dr. Villarreal re: [facility] or Dr. Villarreal visit on 5/9      Pete Earl, PharmD, BCPS, BCOP  Hematology/Oncology Clinical Pharmacist  Oral Chemotherapy Monitoring Program  [facility]  [phone]

## 2024-05-09 ENCOUNTER — VIRTUAL VISIT (OUTPATIENT)
Dept: ONCOLOGY | Facility: CLINIC | Age: 59
End: 2024-05-09
Attending: INTERNAL MEDICINE
Payer: MEDICARE

## 2024-05-09 VITALS — WEIGHT: 155 LBS | HEIGHT: 69 IN | BODY MASS INDEX: 22.96 KG/M2

## 2024-05-09 DIAGNOSIS — N18.32 ANEMIA OF CHRONIC RENAL FAILURE, STAGE 3B (H): ICD-10-CM

## 2024-05-09 DIAGNOSIS — C22.0 HCC (HEPATOCELLULAR CARCINOMA) (H): Primary | ICD-10-CM

## 2024-05-09 DIAGNOSIS — E11.3293 TYPE 2 DIABETES MELLITUS WITH MILD NONPROLIFERATIVE RETINOPATHY OF BOTH EYES WITHOUT MACULAR EDEMA, UNSPECIFIED WHETHER LONG TERM INSULIN USE (H): ICD-10-CM

## 2024-05-09 DIAGNOSIS — C78.6 MALIGNANT NEOPLASM METASTATIC TO PERITONEUM (H): ICD-10-CM

## 2024-05-09 DIAGNOSIS — N18.32 STAGE 3B CHRONIC KIDNEY DISEASE (H): ICD-10-CM

## 2024-05-09 DIAGNOSIS — D63.1 ANEMIA OF CHRONIC RENAL FAILURE, STAGE 3B (H): ICD-10-CM

## 2024-05-09 DIAGNOSIS — Z94.4 LIVER TRANSPLANT RECIPIENT (H): ICD-10-CM

## 2024-05-09 PROCEDURE — G2211 COMPLEX E/M VISIT ADD ON: HCPCS | Mod: 95 | Performed by: INTERNAL MEDICINE

## 2024-05-09 PROCEDURE — 99215 OFFICE O/P EST HI 40 MIN: CPT | Mod: 95 | Performed by: INTERNAL MEDICINE

## 2024-05-09 RX ORDER — SORAFENIB 200 MG/1
200 TABLET, FILM COATED ORAL 2 TIMES DAILY
Qty: 60 TABLET | Refills: 0 | Status: SHIPPED | OUTPATIENT
Start: 2024-05-13 | End: 2024-06-11

## 2024-05-09 ASSESSMENT — PAIN SCALES - GENERAL: PAINLEVEL: NO PAIN (0)

## 2024-05-09 NOTE — PROGRESS NOTES
Virtual Visit Details    Type of service:  Video Visit   Video Start Time:  930  Video End Time: 1000  Originating Location (pt. Location): Home  Distant Location (provider location):  Off-site  Platform used for Video Visit: Odette Workman returns today in follow-up of metastatic hepatocellular carcinoma.    He is 59 years old and was diagnosed 5 years ago with 2 lesions in his cirrhotic liver treated with TACE followed by liver transplant in 2019.  In June 2023 developed peritoneal metastasis and at the end of July started therapy with sorafenib at 400 mg twice per day.  He did not tolerate this due to severe diarrhea and nausea and his dose is since been reduced to 200 mg twice per day which he has been tolerating with good control of his tumor ever since.  He returns today for his next planned response assessment.    He tells me that up until about 10 days ago he had been doing reasonably well and since has had some sort of infection.  He has minor respiratory symptoms a lot of muscle aches and generalized malaise and fatigue.  He was started on some antibiotics which he continues currently and his symptoms did seem to be getting better up until the last day or 2 and now he feels very fatigued again.  Is a little bit more diarrhea than typical but has been able to control that with Lomotil.  He has been generally eating well up until this current illness and right now does not have much appetite and probably has lost about 5 pounds in the last week or so.  He reports he is working hard at maintaining his fluid intake.  His chronic neck and back pain are unchanged.    On exam he appears older than his stated age and is in no acute distress  He has no apparent respiratory distress.  He has no icterus    I personally reviewed his CT scan and went over the results with him.  His tiny peritoneal nodules are stable to improved.  I do not see any new sites of disease.  Is some minor patchy infiltrates in his  lung which if anything are improved.    His alpha-fetoprotein is normal at 5.7.  His electrolytes are normal and his renal function is stable to slightly worse with a creatinine of 1.66.  His bilirubin, albumin and liver enzymes are normal.  He has stable normocytic anemia with a hemoglobin of 10 and otherwise unremarkable blood counts.    Assessment/plan:  1.  Hepatocellular carcinoma with peritoneal metastasis in the setting of prior liver transplant.  His disease appears to be well-controlled on his current reduced dose of sorafenib with a tolerable level of toxicity.  He is paying good attention to his diarrhea, which seems a little bit worse and with his current illness and/or antibiotics, so we will get into trouble again with him getting severely volume depleted.  I do not think this necessitates changes in his current sorafenib dosing.  2.  Chronic renal failure, this is stable and he is maintaining good hydration in the face of his current illness.  3.  Status post liver transplant he continues on his antirejection therapy.  4.  Systemic illness of uncertain etiology.  He is currently completing a course of antibiotics.  His symptoms seem minor enough to not warrant further investigation unless a get worse again.    Will plan ongoing management of his [facility] for his cancer and will repeat our response assessment again in about 2 to 3 months.

## 2024-05-09 NOTE — LETTER
5/9/2024         RE: Frandy Workman  [address] [facility] [address]        Dear Colleague,    Thank you for referring your patient, Frandy Workman, to the [facility]. Please see a copy of my visit note below.    Virtual Visit Details    Type of service:  Video Visit   Video Start Time:  930  Video End Time: 1000  Originating Location (pt. Location): Home  Distant Location (provider location):  Off-site  Platform used for Video Visit: Odette Workman returns today in follow-up of metastatic hepatocellular carcinoma.    He is 59 years old and was diagnosed 5 years ago with 2 lesions in his cirrhotic liver treated with TACE followed by liver transplant in 2019.  In June 2023 developed peritoneal metastasis and at the end of July started therapy with sorafenib at 400 mg twice per day.  He did not tolerate this due to severe diarrhea and nausea and his dose is since been reduced to 200 mg twice per day which he has been tolerating with good control of his tumor ever since.  He returns today for his next planned response assessment.    He tells me that up until about 10 days ago he had been doing reasonably well and since has had some sort of infection.  He has minor respiratory symptoms a lot of muscle aches and generalized malaise and fatigue.  He was started on some antibiotics which he continues currently and his symptoms did seem to be getting better up until the last day or 2 and now he feels very fatigued again.  Is a little bit more diarrhea than typical but has been able to control that with Lomotil.  He has been generally eating well up until this current illness and right now does not have much appetite and probably has lost about 5 pounds in the last week or so.  He reports he is working hard at maintaining his fluid intake.  His chronic neck and back pain are unchanged.    On exam he appears older than his stated age and is in no acute distress  He has no  apparent respiratory distress.  He has no icterus    I personally reviewed his CT scan and went over the results with him.  His tiny peritoneal nodules are stable to improved.  I do not see any new sites of disease.  Is some minor patchy infiltrates in his lung which if anything are improved.    His alpha-fetoprotein is normal at 5.7.  His electrolytes are normal and his renal function is stable to slightly worse with a creatinine of 1.66.  His bilirubin, albumin and liver enzymes are normal.  He has stable normocytic anemia with a hemoglobin of 10 and otherwise unremarkable blood counts.    Assessment/plan:  1.  Hepatocellular carcinoma with peritoneal metastasis in the setting of prior liver transplant.  His disease appears to be well-controlled on his current reduced dose of sorafenib with a tolerable level of toxicity.  He is paying good attention to his diarrhea, which seems a little bit worse and with his current illness and/or antibiotics, so we will get into trouble again with him getting severely volume depleted.  I do not think this necessitates changes in his current sorafenib dosing.  2.  Chronic renal failure, this is stable and he is maintaining good hydration in the face of his current illness.  3.  Status post liver transplant he continues on his antirejection therapy.  4.  Systemic illness of uncertain etiology.  He is currently completing a course of antibiotics.  His symptoms seem minor enough to not warrant further investigation unless a get worse again.    Will plan ongoing management of his Raffin neb for his cancer and will repeat our response assessment again in about 2 to 3 months.          Again, thank you for allowing me to participate in the care of your patient.        Sincerely,        Miguel Villarreal MD

## 2024-05-09 NOTE — LETTER
5/9/2024         RE: Frandy Workman  [address] [facility] [address]      Virtual Visit Details    Type of service:  Video Visit   Video Start Time:  930  Video End Time: 1000  Originating Location (pt. Location): Home  Distant Location (provider location):  Off-site  Platform used for Video Visit: Odette Workman returns today in follow-up of metastatic hepatocellular carcinoma.    He is 59 years old and was diagnosed 5 years ago with 2 lesions in his cirrhotic liver treated with TACE followed by liver transplant in 2019.  In June 2023 developed peritoneal metastasis and at the end of July started therapy with sorafenib at 400 mg twice per day.  He did not tolerate this due to severe diarrhea and nausea and his dose is since been reduced to 200 mg twice per day which he has been tolerating with good control of his tumor ever since.  He returns today for his next planned response assessment.    He tells me that up until about 10 days ago he had been doing reasonably well and since has had some sort of infection.  He has minor respiratory symptoms a lot of muscle aches and generalized malaise and fatigue.  He was started on some antibiotics which he continues currently and his symptoms did seem to be getting better up until the last day or 2 and now he feels very fatigued again.  Is a little bit more diarrhea than typical but has been able to control that with Lomotil.  He has been generally eating well up until this current illness and right now does not have much appetite and probably has lost about 5 pounds in the last week or so.  He reports he is working hard at maintaining his fluid intake.  His chronic neck and back pain are unchanged.    On exam he appears older than his stated age and is in no acute distress  He has no apparent respiratory distress.  He has no icterus    I personally reviewed his CT scan and went over the results with him.  His tiny peritoneal nodules are stable to  improved.  I do not see any new sites of disease.  Is some minor patchy infiltrates in his lung which if anything are improved.    His alpha-fetoprotein is normal at 5.7.  His electrolytes are normal and his renal function is stable to slightly worse with a creatinine of 1.66.  His bilirubin, albumin and liver enzymes are normal.  He has stable normocytic anemia with a hemoglobin of 10 and otherwise unremarkable blood counts.    Assessment/plan:  1.  Hepatocellular carcinoma with peritoneal metastasis in the setting of prior liver transplant.  His disease appears to be well-controlled on his current reduced dose of sorafenib with a tolerable level of toxicity.  He is paying good attention to his diarrhea, which seems a little bit worse and with his current illness and/or antibiotics, so we will get into trouble again with him getting severely volume depleted.  I do not think this necessitates changes in his current sorafenib dosing.  2.  Chronic renal failure, this is stable and he is maintaining good hydration in the face of his current illness.  3.  Status post liver transplant he continues on his antirejection therapy.  4.  Systemic illness of uncertain etiology.  He is currently completing a course of antibiotics.  His symptoms seem minor enough to not warrant further investigation unless a get worse again.    Will plan ongoing management of his Raffin neb for his cancer and will repeat our response assessment again in about 2 to 3 months.            Miguel Villarreal MD

## 2024-05-09 NOTE — NURSING NOTE
Is the patient currently in the state of [place]? YES    Visit mode:VIDEO    If the visit is dropped, the patient can be reconnected by: VIDEO VISIT: Send to e-mail at: [email or web address]    Will anyone else be joining the visit? NO  (If patient encounters technical issues they should call [phone]    How would you like to obtain your AVS? MyChart    Are changes needed to the allergy or medication list? No    Are refills needed on medications prescribed by this physician? NO    Reason for visit: [facility]

## 2024-05-10 DIAGNOSIS — E11.49 TYPE II OR UNSPECIFIED TYPE DIABETES MELLITUS WITH NEUROLOGICAL MANIFESTATIONS, NOT STATED AS UNCONTROLLED(250.60) (H): ICD-10-CM

## 2024-05-10 DIAGNOSIS — Z79.4 TYPE 2 DIABETES MELLITUS WITH STAGE 3A CHRONIC KIDNEY DISEASE, WITH LONG-TERM CURRENT USE OF INSULIN (H): ICD-10-CM

## 2024-05-10 DIAGNOSIS — L60.2 ONYCHAUXIS: ICD-10-CM

## 2024-05-10 DIAGNOSIS — E83.42 HYPOMAGNESEMIA: ICD-10-CM

## 2024-05-10 DIAGNOSIS — E11.69 TYPE 2 DIABETES MELLITUS WITH DIABETIC FOOT DEFORMITY (H): ICD-10-CM

## 2024-05-10 DIAGNOSIS — M21.969 TYPE 2 DIABETES MELLITUS WITH DIABETIC FOOT DEFORMITY (H): ICD-10-CM

## 2024-05-10 DIAGNOSIS — E11.22 TYPE 2 DIABETES MELLITUS WITH STAGE 3A CHRONIC KIDNEY DISEASE, WITH LONG-TERM CURRENT USE OF INSULIN (H): ICD-10-CM

## 2024-05-10 DIAGNOSIS — N18.31 TYPE 2 DIABETES MELLITUS WITH STAGE 3A CHRONIC KIDNEY DISEASE, WITH LONG-TERM CURRENT USE OF INSULIN (H): ICD-10-CM

## 2024-05-10 DIAGNOSIS — N40.1 BENIGN PROSTATIC HYPERPLASIA WITH LOWER URINARY TRACT SYMPTOMS, SYMPTOM DETAILS UNSPECIFIED: ICD-10-CM

## 2024-05-10 RX ORDER — LANOLIN ALCOHOL/MO/W.PET/CERES
400 CREAM (GRAM) TOPICAL DAILY
Qty: 30 TABLET | Refills: 5 | Status: SHIPPED | OUTPATIENT
Start: 2024-05-10

## 2024-05-10 RX ORDER — AMMONIUM LACTATE 12 G/100G
CREAM TOPICAL DAILY PRN
Qty: 140 G | Refills: 5 | Status: SHIPPED | OUTPATIENT
Start: 2024-05-10

## 2024-05-10 NOTE — TELEPHONE ENCOUNTER
Magnesium oxide 400mg tab  Last prescribing provider: Dr. Villarreal    Last clinic visit date: 5/9/24    Recommendations for requested medication (if none, N/A): NA    Any other pertinent information (if none, N/A): NA    Refilled: Y/N, if NO, why?

## 2024-05-10 NOTE — PROGRESS NOTES
Outcome for 05/10/24 9:45 AM: Data obtained via [employer] website  Patient is showing 4/5 MNCM met. BP out range         HPI:     Frandy Workman is a very pleasant 59 year old man here for follow up of type 2 diabetes complicated by nephropathy, retinopathy and neuropathy. 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 Unfortunately, he also has new peritoneal metastatic disease.  He has been sick for the past 3 weeks.  Cough, tired, nasal congestion. Prior to illness, he was still walking a lot- about 5 miles a day and lifting weights.  He is eating much less since he has been sick- just a poor appetite.  Drinking low calorie drinks/water/not much food.  No nausea/vomiting.  +diarrhea, ongoing. Course of antibiotics that ended last week.  Still has a cough- sleeping a lot.  Very tired. Nasal congestion is better. Drinking plenty of fluids.     DM Regimen:  - Tresiba 16 units /day   - Carbohydrate coverage to 1 unit:15g of carbohydrate   - Correction Novolo unit Novolo mg /dl >180      Previous treatments:   - Empagliflozin 10 mg daily (had JERONIMO/hypovolemias on higher dose)-  NOT TAKING- this was stopped since last hospitalization.  - Metformin- started to get diarrhea from it. 500 mg daily. NOT TAKING- this was stopped since last hospitalization.    Patient wears a camilo 2 sensor with an overall average of 148 mg/dL (CV 27%, TIR 86%, hypo 0%, severe hypo 0%) over the past 2 weeks.   Recent glucose is as follows:                 Diabetes Control:   Lab Results   Component Value Date    A1C 6.0 01/10/2022    A1C 6.8 2021    A1C 6.0 2020    A1C 6.3 2020    A1C 6.6 2018    A1C 6.5 2017    A1C 7.8 10/25/2016       Past Medical History:   Diagnosis Date    Anemia     Arthritis     BPH (benign prostatic hyperplasia)      CAD (coronary artery disease) 04/01/2019    Cholelithiasis     Conductive hearing loss 08/16/2017    Depressive disorder 1986    Suffer effects throughout life    Gastroesophageal reflux disease 12/01/2014    HCC (hepatocellular carcinoma) (H) 01/22/2019    History of blood transfusion 2019    Had blood transfussion until Dec 2022    History of diabetic retinopathy 07/2018    HTN (hypertension) 11/20/2019    Hyperlipidemia     Liver cirrhosis secondary to ESTRADA (H)     Liver transplanted (H) 11/11/2019    Portal vein thrombosis 04/11/2019    SCC (squamous cell carcinoma) 02/15/2023    02/15/23:  Left temporal scalp    Type II diabetes mellitus (H)        Past Surgical History:   Procedure Laterality Date    ABDOMEN SURGERY  11/11/2019    Had Liver Transplant    BIOPSY  12/2022    Had biopsy done will have additional procedure 2/15/2023    BYPASS GRAFT ARTERY CORONARY N/A 07/14/2021    Procedure: median sternotomy, on cardiopulmonary bypass, CORONARY ARTERY BYPASS GRAFT (CABG) x2 with left greater saphenous vein endoscopic harvest and left internal mammery artery harvest;  Surgeon: Tom Zapata MD;  Location: [facility] OR    CARDIAC SURGERY  7/2021    Heart Graph. and bypass surgery    CHOLECYSTECTOMY  11/11/2022    Removed with Liver Transplant    COLONOSCOPY      2015    COLONOSCOPY N/A 12/06/2019    Procedure: COLONOSCOPY, WITH POLYPECTOMY AND BIOPSY;  Surgeon: Adam Morton MD;  Location:  GI    CV CENTRAL VENOUS CATHETER PLACEMENT N/A 07/12/2021    Procedure: Central Venous Catheter Placement;  Surgeon: Fermin Polanco MD;  Location: [facility] CARDIAC CATH LAB    CV CORONARY ANGIOGRAM N/A 07/12/2021    Procedure: Coronary Angiogram;  Surgeon: Fermin Polanco MD;  Location: [facility] CARDIAC CATH LAB    CV HEART CATHETERIZATION WITH POSSIBLE INTERVENTION N/A 02/26/2019    Procedure: CORS;  Surgeon: Jagdish Hoyt MD;  Location: [facility] CARDIAC CATH LAB    CV INTRA  AORTIC BALLOON N/A 07/12/2021    Procedure: Intra Aortic Balloon Pump Insertion;  Surgeon: Fermin Polanco MD;  Location:  HEART CARDIAC CATH LAB    ESOPHAGOSCOPY, GASTROSCOPY, DUODENOSCOPY (EGD), COMBINED N/A 11/17/2016    Procedure: COMBINED ESOPHAGOSCOPY, GASTROSCOPY, DUODENOSCOPY (EGD);  Surgeon: Snati Rosas MD;  Location:  GI    ESOPHAGOSCOPY, GASTROSCOPY, DUODENOSCOPY (EGD), COMBINED N/A 11/17/2017    Procedure: COMBINED ESOPHAGOSCOPY, GASTROSCOPY, DUODENOSCOPY (EGD);  EGD;  Surgeon: Santi Rosas MD;  Location:  GI    ESOPHAGOSCOPY, GASTROSCOPY, DUODENOSCOPY (EGD), COMBINED N/A 12/28/2018    Procedure: EGD;  Surgeon: Santi Rosas MD;  Location:  OR    ESOPHAGOSCOPY, GASTROSCOPY, DUODENOSCOPY (EGD), COMBINED N/A 12/06/2019    Procedure: ESOPHAGOGASTRODUODENOSCOPY, WITH BIOPSY;  Surgeon: Adam Morton MD;  Location:  GI    ESOPHAGOSCOPY, GASTROSCOPY, DUODENOSCOPY (EGD), COMBINED N/A 02/13/2020    Procedure: ESOPHAGOGASTRODUODENOSCOPY (EGD);  Surgeon: Santi Rosas MD;  Location:  GI    EXCISE MASS ABDOMEN N/A 07/13/2023    Procedure: Laparoscopic lysis of adhesions, laparoscopic resection of abdominal mass;  Surgeon: Ruiz Chu MD;  Location:  OR    HEAD & NECK SURGERY      12/2017 at [facility].     IMPLANT GOLD WEIGHT EYELID Right 11/16/2017    Procedure: IMPLANT WEIGHT EYELID;  Right Upper Eyelid Weight, right tarsal strip lower eyelid;  Surgeon: Milana Malave MD;  Location:  OR    IR CHEMO EMBOLIZATION  01/22/2019    KNEE SURGERY Left     ORTHOPEDIC SURGERY      PAROTIDECTOMY, RADICAL NECK DISSECTION Right 11/02/2017    Procedure: PAROTIDECTOMY, RADICAL NECK DISSECTION;  Right Superfacial Parotidectomy , Facial nerve repair. with [facility] facial nerve monitor.;  Surgeon: Asiya Morgan MD;  Location:  OR    PHACOEMULSIFICATION CLEAR CORNEA WITH STANDARD INTRAOCULAR LENS IMPLANT Right 01/24/2023    Procedure:  PHACOEMULSIFICATION, COMPLEX CATARACT, WITH INTRAOCULAR LENS IMPLANT WITH TRYPAN RIGHT EYE;  Surgeon: Enriqueta Martin MD;  Location: [facility] OR    PHACOEMULSIFICATION WITH STANDARD INTRAOCULAR LENS IMPLANT Left 2023    Procedure: PHACOEMULSIFICATION, COMPLEX CATARACT, WITH STANDARD INTRAOCULAR LENS IMPLANT INSERTION LEFT EYE;  Surgeon: Enriqueta Martin MD;  Location: [facility] OR    PICC INSERTION Left 2017    4fr SL BioFlo PICC, 44cm in the L basilic vein w/ tip in the low SVC    RETURN LIVER TRANSPLANT N/A 2019    Procedure: Exploratory laparotomy, hematoma evacuation, abdominal washout;  Surgeon: Александр Ramos MD;  Location: [facility] OR    TRANSPLANT LIVER RECIPIENT  DONOR N/A 2019    Procedure: TRANSPLANT, LIVER, RECIPIENT,  DONOR;  Surgeon: Александр Ramos MD;  Location: [facility] OR    VASCULAR SURGERY         Family History   Problem Relation Age of Onset    Skin Cancer Mother     Cancer Mother         mastectomy    Diabetes Mother          3/2016    Cerebrovascular Disease Mother         Passed away in Feb of this year, 80 years old.    Thyroid Disease Mother     Depression Mother     Breast Cancer Mother     Obesity Mother         280 pounds  from this and complications from D    Pancreatic Cancer Father 60    Prostate Cancer Father         Currently in Remission    Macular Degeneration Father     Glaucoma Father     Skin Cancer Father     Coronary Artery Disease Father         Started in his 70's  at 88 in 2023    Colon Cancer Father     Thyroid Disease Sister     Depression Sister     Asthma Sister     Sleep Apnea Brother     Colorectal Cancer Maternal Grandmother     Cancer Maternal Grandmother     Substance Abuse Maternal Grandmother         Alcohol    Prostate Cancer Maternal Grandfather     Substance Abuse Maternal Grandfather         Alcohol    Colorectal Cancer Paternal Grandmother     Asthma Sister         Had since birth    Liver  Disease No family hx of     Melanoma No family hx of     Anesthesia Reaction No family hx of     Deep Vein Thrombosis (DVT) No family hx of        Social History     Socioeconomic History    Marital status:     Highest education level: Bachelor's degree (e.g., BA, AB, BS)   Tobacco Use    Smoking status: Former     Packs/day: 6.00     Years: 30.00     Pack years: 180.00     Types: Cigars, Cigarettes     Start date: 2016     Quit date: 10/25/2017     Years since quittin.0    Smokeless tobacco: Former     Types: Chew     Quit date: 10/31/2017    Tobacco comments:     1 tin per week   Substance and Sexual Activity    Alcohol use: No     Alcohol/week: 0.0 standard drinks     Comment: quit 1996    Drug use: No    Sexual activity: Not Currently     Partners: Female     Birth control/protection: Condom   Other Topics Concern    Parent/sibling w/ CABG, MI or angioplasty before 65F 55M? Yes   Social History Narrative    Prior , [place]     Social Determinants of Health     Intimate Partner Violence: Not At Risk    Fear of Current or Ex-Partner: No    Emotionally Abused: No    Physically Abused: No    Sexually Abused: No       Current Outpatient Medications   Medication Sig Dispense Refill    acetaminophen (TYLENOL) 325 MG tablet TAKE 1 TABLET BY MOUTH EVERY SIX HOURS AS NEEDED FOR MILD PAIN 100 tablet 3    albuterol (PROAIR HFA/PROVENTIL HFA/VENTOLIN HFA) 108 (90 Base) MCG/ACT inhaler Inhale 2 puffs into the lungs every 4 hours as needed for shortness of breath, wheezing or cough 18 g 1    Alcohol Swabs (ALCOHOL PREP) 70 % PADS Use for glucose testing 4x daily  and insulin administration 4x daily. 400 each 1    ammonium lactate (AMLACTIN) 12 % external cream APPLY TOPICALLY DAILY AS NEEDED FOR DRY SKIN (ON FEET) 140 g 5    amoxicillin-clavulanate (AUGMENTIN) 875-125 MG tablet Take 1 tablet by mouth 2 times daily 20 tablet 0    apixaban ANTICOAGULANT (ELIQUIS ANTICOAGULANT) 5 MG tablet Take 1  tablet (5 mg) by mouth 2 times daily 60 tablet 3    benzoyl peroxide 5 % external liquid Use topically in showers as a body wash 226 g 9    blood glucose (NO BRAND SPECIFIED) lancets standard Use to test blood sugar 1 times daily or as directed. 100 each 11    Continuous Blood Gluc Sensor (FREESTYLE CLAUDIA 2 SENSOR) MISC 1 each See Admin Instructions Change every 14 days. 7 each 3    empagliflozin (JARDIANCE) 10 MG TABS tablet Take 1 tablet (10 mg) by mouth daily 30 tablet 11    insulin aspart (NOVOLOG PEN) 100 UNIT/ML pen Inject 5-10 Units Subcutaneous 4 times daily (with meals and nightly) 1unit : 8 g carb before meals.  Also add 1 unit : 50 mg/dl >180 before meals and at bedtime. Approx 45 units daily. 45 mL 1    insulin degludec (TRESIBA FLEXTOUCH) 100 UNIT/ML pen Inject 15 units subcutaneous once daily 45 mL 3    insulin pen needle (ULTICARE MICRO) 32G X 4 MM miscellaneous USE 5 PER  each 3    K-Phos-Neutral 155-852-130 MG tablet Take 1 tablet by mouth 4 times daily 120 tablet 1    ketoconazole (NIZORAL) 2 % external shampoo Apply thin layer topically to scalp in shower (leave on 5 min prior to rinse); may also use as a body wash 120 mL 11    lamiVUDine (EPIVIR) 100 MG tablet Take 1 tablet (100 mg) by mouth daily 90 tablet 3    lisinopril (ZESTRIL) 5 MG tablet Take 1 tablet (5 mg) by mouth daily 90 tablet 3    loperamide (IMODIUM A-D) 2 MG tablet Take 1-2 tablets (2-4 mg) by mouth 4 times daily as needed for diarrhea 120 tablet 3    loperamide (IMODIUM) 2 MG capsule TAKE ONE TO TWO CAPSULES BY MOUTH FOUR TIMES A DAY AS NEEDED FOR DIARRHEA. 120 capsule 2    magnesium oxide 400 MG tablet TAKE 1 TABLET BY MOUTH DAILY 30 tablet 5    metoprolol succinate ER (TOPROL XL) 25 MG 24 hr tablet Take 1 tablet (25 mg) by mouth daily 45 tablet 1    Multiple Vitamin (DAILY-GLADIS MULTIVITAMIN) TABS TAKE 1 TABLET BY MOUTH ONCE DAILY 30 tablet 11    mycophenolate (GENERIC EQUIVALENT) 250 MG capsule TAKE TWO CAPSULES BY MOUTH  EVERY 12 HOURS 120 capsule 11    NIFEdipine ER OSMOTIC (PROCARDIA XL) 60 MG 24 hr tablet TAKE 1 TABLET(60 MG) BY MOUTH TWICE DAILY 6 tablet 0    omega 3 1000 MG CAPS Take 2,000 mg by mouth 2 times daily 120 capsule 10    ondansetron (ZOFRAN ODT) 8 MG ODT tab Take 1 tablet (8 mg) by mouth every 8 hours as needed for nausea 15 tablet 3    pantoprazole (PROTONIX) 40 MG EC tablet TAKE 1 TABLET BY MOUTH ONCE DAILY BEFORE BREAKFAST 90 tablet 3    predniSONE (DELTASONE) 5 MG tablet TAKE ONE TABLET BY MOUTH ONCE DAILY 30 tablet 11    rosuvastatin (CRESTOR) 20 MG tablet Take 1 tablet (20 mg) by mouth daily 90 tablet 3    sodium zirconium cyclosilicate (LOKELMA) 10 g PACK packet Take 10 gram 3x/day for 2 days then 10 grams daily. (Patient taking differently: Take 10 g by mouth daily Take 10 gram 3x/day for 2 days then 10 grams daily.) 30 packet 11    SORAfenib (NEXAVAR) 200 MG tablet Take 1 tablet (200 mg) by mouth 2 times daily On an empty stomach 1 hour before or 2 hours after a meal. 60 tablet 0    SORAfenib (NEXAVAR) 200 MG tablet Take 1 tablet (200 mg) by mouth 2 times daily On an empty stomach 1 hour before or 2 hours after a meal. 60 tablet 0    tamsulosin (FLOMAX) 0.4 MG capsule Take 1 capsule (0.4 mg) by mouth daily 90 capsule 3    vitamin D3 (CHOLECALCIFEROL) 50 mcg (2000 units) tablet TAKE 1 TABLET BY MOUTH ONCE DAILY 30 tablet 8     Current Facility-Administered Medications   Medication Dose Route Frequency Provider Last Rate Last Admin    aflibercept (EYLEA) injection prefilled syringe 2 mg  2 mg Intravitreal Q28 Days Enriqueta Martin MD   2 mg at 05/31/23 1144    dexamethasone (OZURDEX) intravitreal implant 0.7 mg  0.7 mg Intravitreal Q2 Months Enriqueta Martin MD        dexamethasone (OZURDEX) intravitreal implant 0.7 mg  0.7 mg Intravitreal Q2 Months Enriqueta Martin MD        faricimab-svoa (VABYSMO) intravitreal injection 6 mg  6 mg Intravitreal q28 days Enriqueta Martin MD         [facility]a [facility] intravitreal injection 6 mg  6 mg Intravitreal q28 days Enriqueta Martin MD        faricimab-svoa ([facility]) intravitreal injection 6 mg  6 mg Intravitreal q28 days Enriqueta Martin MD   6 mg at 03/06/24 1001    lidocaine (PF) (XYLOCAINE) injection 1 mL  1 mL Ophthalmic Q2 Months Enriqueta Martin MD              Allergies   Allergen Reactions    Codeine Other (See Comments)     Cannot take due to liver  Cannot tolerate oral narcotics    Seasonal Allergies      Sneezing, coughing, runny and itchy eyes       Physical Exam  /80   Pulse 66   Wt 70.8 kg (156 lb)   SpO2 100%   BMI 23.04 kg/m    GENERAL:  Alert and oriented X3, NAD, well dressed, answering questions appropriately, appears thin.  CV: RRR, no murmurs  LUNGS: CTAB, no wheezes, rales, or ronchi  EXTREMITIES: no edema, +pulses, no rashes, no lesions  NEUROLOGY: did not examine, as he will see podiatry today.     RESULTS  Lab Results   Component Value Date    A1C 5.7 (H) 12/18/2023    A1C 6.3 (H) 06/14/2023    A1C 6.9 (H) 12/14/2022    A1C 6.0 (H) 01/10/2022    A1C 6.8 (H) 07/13/2021    A1C 6.0 (H) 12/30/2020    A1C 6.3 (H) 06/13/2020    A1C 6.6 (H) 08/08/2018    A1C 6.5 (H) 06/09/2017    A1C 7.8 (H) 10/25/2016    HEMOGLOBINA1 4.8 03/04/2020    HEMOGLOBINA1 5.1 12/02/2019    HEMOGLOBINA1 5.4 08/14/2019    HEMOGLOBINA1 6.1 (A) 02/11/2019    HEMOGLOBINA1 8.1 (A) 04/11/2018       TSH   Date Value Ref Range Status   04/25/2022 1.24 0.40 - 4.00 mU/L Final   10/04/2021 1.90 0.40 - 4.00 mU/L Final   01/28/2021 0.91 0.40 - 4.00 mU/L Final   01/24/2020 1.91 0.40 - 4.00 mU/L Final   08/08/2018 0.49 0.40 - 4.00 mU/L Final   02/08/2018 0.71 0.40 - 4.00 mU/L Final   06/09/2017 0.42 0.40 - 4.00 mU/L Final     T4 Free   Date Value Ref Range Status   08/08/2018 1.21 0.76 - 1.46 ng/dL Final       ALT   Date Value Ref Range Status   05/06/2024 22 0 - 70 U/L Final     Comment:     Reference intervals for this test were  updated on 6/12/2023 to more accurately reflect our healthy population. There may be differences in the flagging of prior results with similar values performed with this method. Interpretation of those prior results can be made in the context of the updated reference intervals.     04/17/2024 28 0 - 70 U/L Final     Comment:     Reference intervals for this test were updated on 6/12/2023 to more accurately reflect our healthy population. There may be differences in the flagging of prior results with similar values performed with this method. Interpretation of those prior results can be made in the context of the updated reference intervals.     06/28/2021 20 0 - 70 U/L Final   06/14/2021 21 0 - 70 U/L Final   ]    Recent Labs   Lab Test 07/28/23  1046 06/14/23  0924 12/14/22  0847 09/07/21  1025   CHOL 178 194   < > 176   HDL 34* 37*   < > 34*   LDL  --  78  --  95   TRIG 405* 394*   < > 233*    < > = values in this interval not displayed.       Lab Results   Component Value Date     05/06/2024     06/28/2021      Lab Results   Component Value Date    POTASSIUM 5.1 05/06/2024    POTASSIUM 7.7 08/10/2023    POTASSIUM 4.7 07/20/2022    POTASSIUM 4.6 06/28/2021     Lab Results   Component Value Date    CHLORIDE 105 05/06/2024    CHLORIDE 105 07/20/2022    CHLORIDE 107 06/28/2021     Lab Results   Component Value Date    DEBORAH 9.2 05/06/2024    DEBORAH 9.1 06/28/2021     Lab Results   Component Value Date    CO2 21 05/06/2024    CO2 26 07/20/2022    CO2 25 06/28/2021     Lab Results   Component Value Date    BUN 31.9 05/06/2024    BUN 32 07/20/2022    BUN 33 06/28/2021     Lab Results   Component Value Date    CR 1.66 05/06/2024    CR 1.62 06/28/2021       GFR Estimate   Date Value Ref Range Status   05/06/2024 47 (L) >60 mL/min/1.73m2 Final   04/17/2024 50 (L) >60 mL/min/1.73m2 Final   03/13/2024 58 (L) >60 mL/min/1.73m2 Final   06/28/2021 46 (L) >60 mL/min/[1.73_m2] Final     Comment:     Non   "GFR Calc  Starting 12/18/2018, serum creatinine based estimated GFR (eGFR) will be   calculated using the Chronic Kidney Disease Epidemiology Collaboration   (CKD-EPI) equation.     06/14/2021 49 (L) >60 mL/min/[1.73_m2] Final     Comment:     Non  GFR Calc  Starting 12/18/2018, serum creatinine based estimated GFR (eGFR) will be   calculated using the Chronic Kidney Disease Epidemiology Collaboration   (CKD-EPI) equation.     06/04/2021 46 (L) >60 mL/min/[1.73_m2] Final     Comment:     Non  GFR Calc  Starting 12/18/2018, serum creatinine based estimated GFR (eGFR) will be   calculated using the Chronic Kidney Disease Epidemiology Collaboration   (CKD-EPI) equation.       GFR, ESTIMATED POCT   Date Value Ref Range Status   01/30/2024 46 (L) >60 mL/min/1.73m2 Final   07/28/2023 28 (L) >60 mL/min/1.73m2 Final   06/14/2023 38 (L) >60 mL/min/1.73m2 Final     GFR Estimate If Black   Date Value Ref Range Status   06/28/2021 54 (L) >60 mL/min/[1.73_m2] Final     Comment:      GFR Calc  Starting 12/18/2018, serum creatinine based estimated GFR (eGFR) will be   calculated using the Chronic Kidney Disease Epidemiology Collaboration   (CKD-EPI) equation.     06/14/2021 57 (L) >60 mL/min/[1.73_m2] Final     Comment:      GFR Calc  Starting 12/18/2018, serum creatinine based estimated GFR (eGFR) will be   calculated using the Chronic Kidney Disease Epidemiology Collaboration   (CKD-EPI) equation.     06/04/2021 53 (L) >60 mL/min/[1.73_m2] Final     Comment:      GFR Calc  Starting 12/18/2018, serum creatinine based estimated GFR (eGFR) will be   calculated using the Chronic Kidney Disease Epidemiology Collaboration   (CKD-EPI) equation.         Lab Results   Component Value Date    MICROL 644.0 04/30/2024    MICROL 47 04/20/2022    MICROL 563 02/26/2021     No results found for: \"MICROALBUMIN\"  No results found for: \"CPEPT\", \"GADAB\", " "\"ISCAB\"    Vitamin B12   Date Value Ref Range Status   08/15/2023 491 232 - 1,245 pg/mL Final   01/11/2022 2,179 (H) 193 - 986 pg/mL Final   06/13/2020 682 193 - 986 pg/mL Final   02/04/2019 3,006 (H) 193 - 986 pg/mL Final   ]    Most recent eye exam date: : Not Found       Assessment/Plan:     1.  Type 2 diabetes- Doing well with glucose control (A1c 6.5%), but he has poor appetite and is losing weight.  Discussed the importance of optimizing his nutrition and we will work around his insulin regimen if his glucose rises.  Encouraged him to try full calorie, instead of low calorie shakes/supplements if he is not eating regularly.  Encouraged him to reach out if glucose climbs high with these changes, and we can adjust his insulin.  Offered referral to dietitian.  Please let me know if you are having low blood sugars less than 70 or over 250 mg/dL.      2.  Risk factors-     Retinopathy:  Yes.  Had eye exam within last year.   Nephropathy:  BP historically well controlled.+ albuminuria.  Creatinine stable.   Neuropathy: Yes.   Feet: OK, no ulcers. +neuropathy Sees podiatry today. Wears diabetic shoe.   Lipids:  TG were high at last check. Goal LDL <70. Patient taking rosuvastatin     3.  F/U in 6 months, sooner with concerns.    22 minutes spent on the date of the encounter doing chart review, review of test results, review and interpretation of glucose data, patient visit and documentation, counseling/coordination of care, and discussion of follow up plan for worsening hyper and hypoglycemia.  The patient understood and is satisfied with today's visit.          Frannie Vasquez PA-C, MPAS   [facility]  Department of Medicine  Division of Endocrinology and Diabetes            "

## 2024-05-13 ENCOUNTER — OFFICE VISIT (OUTPATIENT)
Dept: ENDOCRINOLOGY | Facility: CLINIC | Age: 59
End: 2024-05-13
Payer: MEDICARE

## 2024-05-13 ENCOUNTER — OFFICE VISIT (OUTPATIENT)
Dept: ORTHOPEDICS | Facility: CLINIC | Age: 59
End: 2024-05-13
Payer: MEDICARE

## 2024-05-13 VITALS
HEART RATE: 66 BPM | SYSTOLIC BLOOD PRESSURE: 127 MMHG | WEIGHT: 156 LBS | OXYGEN SATURATION: 100 % | DIASTOLIC BLOOD PRESSURE: 80 MMHG | BODY MASS INDEX: 23.04 KG/M2

## 2024-05-13 DIAGNOSIS — N18.31 TYPE 2 DIABETES MELLITUS WITH STAGE 3A CHRONIC KIDNEY DISEASE, WITH LONG-TERM CURRENT USE OF INSULIN (H): ICD-10-CM

## 2024-05-13 DIAGNOSIS — L60.2 ONYCHAUXIS: ICD-10-CM

## 2024-05-13 DIAGNOSIS — E11.3293 TYPE 2 DIABETES MELLITUS WITH MILD NONPROLIFERATIVE RETINOPATHY OF BOTH EYES WITHOUT MACULAR EDEMA, UNSPECIFIED WHETHER LONG TERM INSULIN USE (H): ICD-10-CM

## 2024-05-13 DIAGNOSIS — E11.51 DIABETES MELLITUS WITH PERIPHERAL VASCULAR DISEASE (H): ICD-10-CM

## 2024-05-13 DIAGNOSIS — E11.22 TYPE 2 DIABETES MELLITUS WITH STAGE 3A CHRONIC KIDNEY DISEASE, WITH LONG-TERM CURRENT USE OF INSULIN (H): ICD-10-CM

## 2024-05-13 DIAGNOSIS — E11.69 TYPE 2 DIABETES MELLITUS WITH DIABETIC FOOT DEFORMITY (H): ICD-10-CM

## 2024-05-13 DIAGNOSIS — Z79.4 TYPE 2 DIABETES MELLITUS WITH STAGE 3A CHRONIC KIDNEY DISEASE, WITH LONG-TERM CURRENT USE OF INSULIN (H): ICD-10-CM

## 2024-05-13 DIAGNOSIS — E11.49 TYPE II OR UNSPECIFIED TYPE DIABETES MELLITUS WITH NEUROLOGICAL MANIFESTATIONS, NOT STATED AS UNCONTROLLED(250.60) (H): Primary | ICD-10-CM

## 2024-05-13 DIAGNOSIS — M21.969 TYPE 2 DIABETES MELLITUS WITH DIABETIC FOOT DEFORMITY (H): ICD-10-CM

## 2024-05-13 LAB — HBA1C MFR BLD: 5.6 %

## 2024-05-13 PROCEDURE — 11719 TRIM NAIL(S) ANY NUMBER: CPT | Performed by: PODIATRIST

## 2024-05-13 PROCEDURE — 99213 OFFICE O/P EST LOW 20 MIN: CPT | Mod: 25 | Performed by: PODIATRIST

## 2024-05-13 PROCEDURE — 99213 OFFICE O/P EST LOW 20 MIN: CPT | Performed by: PHYSICIAN ASSISTANT

## 2024-05-13 PROCEDURE — 83036 HEMOGLOBIN GLYCOSYLATED A1C: CPT | Performed by: PATHOLOGY

## 2024-05-13 ASSESSMENT — PAIN SCALES - GENERAL: PAINLEVEL: SEVERE PAIN (6)

## 2024-05-13 NOTE — LETTER
5/13/2024         RE: Frandy Workman  [address] [facility] [address]        Dear Colleague,    Thank you for referring your patient, Frandy Workman, to the [facility] [place]. Please see a copy of my visit note below.    Past Medical History:   Diagnosis Date    Anemia 2013    Arthritis     BPH (benign prostatic hyperplasia)     CAD (coronary artery disease) 04/01/2019    Cholelithiasis     Conductive hearing loss 08/16/2017    Depressive disorder 1986    Suffer effects throughout life    Gastroesophageal reflux disease 12/01/2014    HCC (hepatocellular carcinoma) (H) 01/22/2019    History of blood transfusion 2019    Had blood transfussion until Dec 2022    History of diabetic retinopathy 07/2018    HTN (hypertension) 11/20/2019    Hyperlipidemia     Liver cirrhosis secondary to ESTRADA (H)     Liver transplanted (H) 11/11/2019    Portal vein thrombosis 04/11/2019    SCC (squamous cell carcinoma) 02/15/2023    02/15/23:  Left temporal scalp    Type II diabetes mellitus (H)      Patient Active Problem List   Diagnosis    Bipolar affective disorder in remission (H24)    Esophageal varices determined by endoscopy (H)    Erectile dysfunction due to diseases classified elsewhere    HCC (hepatocellular carcinoma) (H)    Equivocal stress echocardiogram    Type 2 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, unspecified whether long term insulin use (H)    Status post coronary angiogram    CAD (coronary artery disease)    Liver transplant recipient (H)    Status post liver transplantation (H)    Immunosuppressed status (H24)    Benign essential hypertension    Anemia of chronic renal failure, stage 3a (H)    History of coronary artery disease    Dyslipidemia    Excessive sweating    Stage 3b chronic kidney disease (H)    Anemia of chronic renal failure, stage 3b (H)    NSTEMI (non-ST elevated myocardial infarction) (H)    Chest pain,  unspecified type    Hypertensive chronic kidney disease with stage 5 chronic kidney disease or end stage renal disease (H)    Vitreous hemorrhage of left eye (H)    Proliferative diabetic retinopathy of both eyes associated with type 2 diabetes mellitus, unspecified proliferative retinopathy type (H)    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)    Liver replaced by transplant (H)    Hyperkalemia    Acute renal failure, unspecified acute renal failure type (H24)    ARIELA (obstructive sleep apnea)    History of nonmelanoma skin cancer    Neoplasm of unspecified behavior of bone, soft tissue, and skin    SCC (squamous cell carcinoma)     Past Surgical History:   Procedure Laterality Date    ABDOMEN SURGERY  11/11/2019    Had Liver Transplant    BIOPSY  12/2022    Had biopsy done will have additional procedure 2/15/2023    BYPASS GRAFT ARTERY CORONARY N/A 07/14/2021    Procedure: median sternotomy, on cardiopulmonary bypass, CORONARY ARTERY BYPASS GRAFT (CABG) x2 with left greater saphenous vein endoscopic harvest and left internal mammery artery harvest;  Surgeon: Tom Zapata MD;  Location: [facility] OR    CARDIAC SURGERY  7/2021    Heart Graph. and bypass surgery    CHOLECYSTECTOMY  11/11/2022    Removed with Liver Transplant    COLONOSCOPY      2015    COLONOSCOPY N/A 12/06/2019    Procedure: COLONOSCOPY, WITH POLYPECTOMY AND BIOPSY;  Surgeon: Adam Morton MD;  Location:  GI    CV CENTRAL VENOUS CATHETER PLACEMENT N/A 07/12/2021    Procedure: Central Venous Catheter Placement;  Surgeon: Fermin Polanco MD;  Location: [facility] CARDIAC CATH LAB    CV CORONARY ANGIOGRAM N/A 07/12/2021    Procedure: Coronary Angiogram;  Surgeon: Fermin Polanco MD;  Location: [facility] CARDIAC CATH LAB    CV HEART CATHETERIZATION WITH POSSIBLE INTERVENTION N/A 02/26/2019    Procedure: CORS;  Surgeon: Jagdish Hoyt MD;  Location: [facility] CARDIAC CATH LAB    CV INTRA AORTIC BALLOON N/A  07/12/2021    Procedure: Intra Aortic Balloon Pump Insertion;  Surgeon: Fermin Polanco MD;  Location:  HEART CARDIAC CATH LAB    ESOPHAGOSCOPY, GASTROSCOPY, DUODENOSCOPY (EGD), COMBINED N/A 11/17/2016    Procedure: COMBINED ESOPHAGOSCOPY, GASTROSCOPY, DUODENOSCOPY (EGD);  Surgeon: Santi Rosas MD;  Location:  GI    ESOPHAGOSCOPY, GASTROSCOPY, DUODENOSCOPY (EGD), COMBINED N/A 11/17/2017    Procedure: COMBINED ESOPHAGOSCOPY, GASTROSCOPY, DUODENOSCOPY (EGD);  EGD;  Surgeon: Santi Rosas MD;  Location:  GI    ESOPHAGOSCOPY, GASTROSCOPY, DUODENOSCOPY (EGD), COMBINED N/A 12/28/2018    Procedure: EGD;  Surgeon: Santi Rosas MD;  Location:  OR    ESOPHAGOSCOPY, GASTROSCOPY, DUODENOSCOPY (EGD), COMBINED N/A 12/06/2019    Procedure: ESOPHAGOGASTRODUODENOSCOPY, WITH BIOPSY;  Surgeon: Adam Morton MD;  Location:  GI    ESOPHAGOSCOPY, GASTROSCOPY, DUODENOSCOPY (EGD), COMBINED N/A 02/13/2020    Procedure: ESOPHAGOGASTRODUODENOSCOPY (EGD);  Surgeon: Santi Rosas MD;  Location:  GI    EXCISE MASS ABDOMEN N/A 07/13/2023    Procedure: Laparoscopic lysis of adhesions, laparoscopic resection of abdominal mass;  Surgeon: Ruiz Chu MD;  Location:  OR    HEAD & NECK SURGERY      12/2017 at [facility].     IMPLANT GOLD WEIGHT EYELID Right 11/16/2017    Procedure: IMPLANT WEIGHT EYELID;  Right Upper Eyelid Weight, right tarsal strip lower eyelid;  Surgeon: Milana Malave MD;  Location:  OR    IR CHEMO EMBOLIZATION  01/22/2019    KNEE SURGERY Left     ORTHOPEDIC SURGERY      PAROTIDECTOMY, RADICAL NECK DISSECTION Right 11/02/2017    Procedure: PAROTIDECTOMY, RADICAL NECK DISSECTION;  Right Superfacial Parotidectomy , Facial nerve repair. with [facility] facial nerve monitor.;  Surgeon: Asiya Morgan MD;  Location:  OR    PHACOEMULSIFICATION CLEAR CORNEA WITH STANDARD INTRAOCULAR LENS IMPLANT Right 01/24/2023    Procedure: PHACOEMULSIFICATION, COMPLEX  CATARACT, WITH INTRAOCULAR LENS IMPLANT WITH TRYPAN RIGHT EYE;  Surgeon: Enriqueta Martin MD;  Location: [facility] OR    PHACOEMULSIFICATION WITH STANDARD INTRAOCULAR LENS IMPLANT Left 2023    Procedure: PHACOEMULSIFICATION, COMPLEX CATARACT, WITH STANDARD INTRAOCULAR LENS IMPLANT INSERTION LEFT EYE;  Surgeon: Enriqueta Martin MD;  Location: [facility] OR    PICC INSERTION Left 2017    4fr SL BioFlo PICC, 44cm in the L basilic vein w/ tip in the low SVC    RETURN LIVER TRANSPLANT N/A 2019    Procedure: Exploratory laparotomy, hematoma evacuation, abdominal washout;  Surgeon: Александр Ramos MD;  Location: [facility] OR    TRANSPLANT LIVER RECIPIENT  DONOR N/A 2019    Procedure: TRANSPLANT, LIVER, RECIPIENT,  DONOR;  Surgeon: Александр Ramos MD;  Location: [facility] OR    VASCULAR SURGERY       Social History     Socioeconomic History    Marital status:      Spouse name: Not on file    Number of children: Not on file    Years of education: Not on file    Highest education level: Bachelor's degree (e.g., BA, AB, BS)   Occupational History    Not on file   Tobacco Use    Smoking status: Former     Types: Dip, chew, snus or snuff     Passive exposure: Past    Smokeless tobacco: Former     Types: Chew     Quit date: 10/31/2017    Tobacco comments:     Quit [place] after Doctor told me in 2017 that if I didn't I would   Vaping Use    Vaping status: Never Used   Substance and Sexual Activity    Alcohol use: No     Comment: quit 1996    Drug use: No    Sexual activity: Not Currently     Partners: Female     Birth control/protection: Condom   Other Topics Concern    Parent/sibling w/ CABG, MI or angioplasty before 65F 55M? No   Social History Narrative    Prior , [facility]     Social Determinants of Health     Financial Resource Strain: Low Risk  (2024)    Financial Resource Strain     Within the past 12 months, have you or your family members you live with  been unable to get utilities (heat, electricity) when it was really needed?: No   Food Insecurity: Low Risk  (1/23/2024)    Food Insecurity     Within the past 12 months, did you worry that your food would run out before you got money to buy more?: No     Within the past 12 months, did the food you bought just not last and you didn t have money to get more?: No   Transportation Needs: Low Risk  (1/23/2024)    Transportation Needs     Within the past 12 months, has lack of transportation kept you from medical appointments, getting your medicines, non-medical meetings or appointments, work, or from getting things that you need?: No   Physical Activity: Insufficiently Active (10/13/2020)    Exercise Vital Sign     Days of Exercise per Week: 1 day     Minutes of Exercise per Session: 60 min   Stress: Stress Concern Present (10/13/2020)    [employer] [place] [employer] - Occupational Stress Questionnaire     Feeling of Stress : To some extent   Social Connections: Socially Isolated (10/13/2020)    Social Connection and Isolation Panel [NHANES]     Frequency of Communication with Friends and Family: Once a week     Frequency of Social Gatherings with Friends and Family: Once a week     Attends Jew Services: Never     Active Member of Clubs or Organizations: No     Attends Club or Organization Meetings: Never     Marital Status:    Interpersonal Safety: Low Risk  (4/30/2024)    Interpersonal Safety     Do you feel physically and emotionally safe where you currently live?: Yes     Within the past 12 months, have you been hit, slapped, kicked or otherwise physically hurt by someone?: No     Within the past 12 months, have you been humiliated or emotionally abused in other ways by your partner or ex-partner?: No   Housing Stability: Low Risk  (1/23/2024)    Housing Stability     Do you have housing? : Yes     Are you worried about losing your housing?: No     Family History   Problem Relation Age of  "Onset    Skin Cancer Mother     Cancer Mother         mastectomy    Diabetes Mother          3/2016    Cerebrovascular Disease Mother         Passed away in Feb of this year, 80 years old.    Thyroid Disease Mother     Depression Mother     Breast Cancer Mother     Obesity Mother         280 pounds  from this and complications from D    Pancreatic Cancer Father 60    Prostate Cancer Father         Currently in Remission    Macular Degeneration Father     Glaucoma Father     Skin Cancer Father     Coronary Artery Disease Father         Started in his 70's  at 88 in Octobe2023    Colon Cancer Father     Thyroid Disease Sister     Depression Sister     Asthma Sister     Sleep Apnea Brother     Colorectal Cancer Maternal Grandmother     Cancer Maternal Grandmother     Substance Abuse Maternal Grandmother         Alcohol    Prostate Cancer Maternal Grandfather     Substance Abuse Maternal Grandfather         Alcohol    Colorectal Cancer Paternal Grandmother     Asthma Sister         Had since birth    Liver Disease No family hx of     Melanoma No family hx of     Anesthesia Reaction No family hx of     Deep Vein Thrombosis (DVT) No family hx of        Lab Results   Component Value Date    A1C 5.6 2024    A1C 5.7 2023    A1C 6.3 2023    A1C 6.9 2022    A1C 6.0 01/10/2022    A1C 6.8 2021    A1C 6.0 2020    A1C 6.3 2020    A1C 6.6 2018    A1C 6.5 2017    A1C 7.8 10/25/2016     Lab Results   Component Value Date    WBC 4.5 2024    WBC 4.4 2021     Lab Results   Component Value Date    RBC 3.36 2024    RBC 2.35 2021     Lab Results   Component Value Date    HGB 10.2 2024    HGB 7.3 2021     Lab Results   Component Value Date    HCT 32.6 2024    HCT 22.6 2021     No components found for: \"MCT\"  Lab Results   Component Value Date    MCV 97 2024    MCV 96 2021     Lab Results   Component Value Date "    MCH 30.4 05/06/2024    MCH 31.1 06/28/2021     Lab Results   Component Value Date    MCHC 31.3 05/06/2024    MCHC 32.3 06/28/2021     Lab Results   Component Value Date    RDW 15.5 05/06/2024    RDW 15.1 06/28/2021     Lab Results   Component Value Date     05/06/2024     06/28/2021     Last Comprehensive Metabolic Panel:  Lab Results   Component Value Date     05/06/2024    POTASSIUM 5.1 05/06/2024    CHLORIDE 105 05/06/2024    CO2 21 (L) 05/06/2024    ANIONGAP 12 05/06/2024     (H) 05/06/2024    BUN 31.9 (H) 05/06/2024    CR 1.66 (H) 05/06/2024    GFRESTIMATED 47 (L) 05/06/2024    DEBORAH 9.2 05/06/2024             Subjective findings- 59-year-old returns clinic for diabetic foot cares.  Relates he is doing relatively well, relates to no ulcers or sores since we seen him last, relates he has Diabetic shoes, relates to numbness tingling and Neuropathy in his feet, relates he needs his toenails cut, relates he has been doing walking exercise.      Objective findings- DP and PT are 2 out of 4 bilaterally.  Has dorsally contracted digits bilaterally.  He has incurvated nails with some of them broken off With thickening dystrophy and subungual debris digits during degrees bilaterally.  He has diffuse hyperkeratotic tissue buildup plantar MPJs bilaterally.  There is no erythema, no drainage, no odor, no calor, no pain on palpation bilaterally.    Assessment and plan- Onychauxis and Onychomycosis bilaterally, Diabetes with peripheral Neuropathy with foot deformity and callus.  Diagnosis and treatment options discussed with the patient.  Advised him on moisturizing lotion use.  All the toenails were debrided or reduced bilaterally upon consent.  Continue diabetic shoes.  Previous notes reviewed.  Return to clinic and see me in 2 to 3 months.        Moderate level of medical decision making.      Again, thank you for allowing me to participate in the care of your patient.         Sincerely,        Lamin Gonzalez DPM

## 2024-05-13 NOTE — LETTER
2024       RE: Frandy Workman  [address] [facility] [address]     Dear Colleague,    Thank you for referring your patient, Frandy Workman, to the [facility] [place] [facility]. Please see a copy of my visit note below.    Outcome for 05/10/24 9:45 AM: Data obtained via [employer] website  Patient is showing 4/5 MNCM met. BP out range         HPI:     Frandy Workman is a very pleasant 59 year old man here for follow up of type 2 diabetes complicated by nephropathy, retinopathy and neuropathy. 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 Unfortunately, he also has new peritoneal metastatic disease.  He has been sick for the past 3 weeks.  Cough, tired, nasal congestion. Prior to illness, he was still walking a lot- about 5 miles a day and lifting weights.  He is eating much less since he has been sick- just a poor appetite.  Drinking low calorie drinks/water/not much food.  No nausea/vomiting.  +diarrhea, ongoing. Course of antibiotics that ended last week.  Still has a cough- sleeping a lot.  Very tired. Nasal congestion is better. Drinking plenty of fluids.     DM Regimen:  - Tresiba 16 units /day   - Carbohydrate coverage to 1 unit:15g of carbohydrate   - Correction Novolo unit Novolo mg /dl >180      Previous treatments:   - Empagliflozin 10 mg daily (had JERONIMO/hypovolemias on higher dose)-  NOT TAKING- this was stopped since last hospitalization.  - Metformin- started to get diarrhea from it. 500 mg daily. NOT TAKING- this was stopped since last hospitalization.    Patient wears a camilo 2 sensor with an overall average of 148 mg/dL (CV 27%, TIR 86%, hypo 0%, severe hypo 0%) over the past 2 weeks.   Recent glucose is as follows:                 Diabetes  Control:   Lab Results   Component Value Date    A1C 6.0 01/10/2022    A1C 6.8 07/13/2021    A1C 6.0 12/30/2020    A1C 6.3 06/13/2020    A1C 6.6 08/08/2018    A1C 6.5 06/09/2017    A1C 7.8 10/25/2016       Past Medical History:   Diagnosis Date    Anemia 2013    Arthritis     BPH (benign prostatic hyperplasia)     CAD (coronary artery disease) 04/01/2019    Cholelithiasis     Conductive hearing loss 08/16/2017    Depressive disorder 1986    Suffer effects throughout life    Gastroesophageal reflux disease 12/01/2014    HCC (hepatocellular carcinoma) (H) 01/22/2019    History of blood transfusion 2019    Had blood transfussion until Dec 2022    History of diabetic retinopathy 07/2018    HTN (hypertension) 11/20/2019    Hyperlipidemia     Liver cirrhosis secondary to ESTRADA (H)     Liver transplanted (H) 11/11/2019    Portal vein thrombosis 04/11/2019    SCC (squamous cell carcinoma) 02/15/2023    02/15/23:  Left temporal scalp    Type II diabetes mellitus (H)        Past Surgical History:   Procedure Laterality Date    ABDOMEN SURGERY  11/11/2019    Had Liver Transplant    BIOPSY  12/2022    Had biopsy done will have additional procedure 2/15/2023    BYPASS GRAFT ARTERY CORONARY N/A 07/14/2021    Procedure: median sternotomy, on cardiopulmonary bypass, CORONARY ARTERY BYPASS GRAFT (CABG) x2 with left greater saphenous vein endoscopic harvest and left internal mammery artery harvest;  Surgeon: Tom Zapata MD;  Location:  OR    CARDIAC SURGERY  7/2021    Heart Graph. and bypass surgery    CHOLECYSTECTOMY  11/11/2022    Removed with Liver Transplant    COLONOSCOPY      2015    COLONOSCOPY N/A 12/06/2019    Procedure: COLONOSCOPY, WITH POLYPECTOMY AND BIOPSY;  Surgeon: Adam Morton MD;  Location:  GI    CV CENTRAL VENOUS CATHETER PLACEMENT N/A 07/12/2021    Procedure: Central Venous Catheter Placement;  Surgeon: Fermin Polanco MD;  Location:  HEART CARDIAC CATH LAB    CV CORONARY  ANGIOGRAM N/A 07/12/2021    Procedure: Coronary Angiogram;  Surgeon: Fermin Polanco MD;  Location:  HEART CARDIAC CATH LAB    CV HEART CATHETERIZATION WITH POSSIBLE INTERVENTION N/A 02/26/2019    Procedure: CORS;  Surgeon: Jagdish Hoyt MD;  Location:  HEART CARDIAC CATH LAB    CV INTRA AORTIC BALLOON N/A 07/12/2021    Procedure: Intra Aortic Balloon Pump Insertion;  Surgeon: Fermin Polanco MD;  Location:  HEART CARDIAC CATH LAB    ESOPHAGOSCOPY, GASTROSCOPY, DUODENOSCOPY (EGD), COMBINED N/A 11/17/2016    Procedure: COMBINED ESOPHAGOSCOPY, GASTROSCOPY, DUODENOSCOPY (EGD);  Surgeon: Santi Rosas MD;  Location:  GI    ESOPHAGOSCOPY, GASTROSCOPY, DUODENOSCOPY (EGD), COMBINED N/A 11/17/2017    Procedure: COMBINED ESOPHAGOSCOPY, GASTROSCOPY, DUODENOSCOPY (EGD);  EGD;  Surgeon: Santi Rosas MD;  Location:  GI    ESOPHAGOSCOPY, GASTROSCOPY, DUODENOSCOPY (EGD), COMBINED N/A 12/28/2018    Procedure: EGD;  Surgeon: Santi Rosas MD;  Location:  OR    ESOPHAGOSCOPY, GASTROSCOPY, DUODENOSCOPY (EGD), COMBINED N/A 12/06/2019    Procedure: ESOPHAGOGASTRODUODENOSCOPY, WITH BIOPSY;  Surgeon: Adam Morton MD;  Location:  GI    ESOPHAGOSCOPY, GASTROSCOPY, DUODENOSCOPY (EGD), COMBINED N/A 02/13/2020    Procedure: ESOPHAGOGASTRODUODENOSCOPY (EGD);  Surgeon: Santi Rosas MD;  Location:  GI    EXCISE MASS ABDOMEN N/A 07/13/2023    Procedure: Laparoscopic lysis of adhesions, laparoscopic resection of abdominal mass;  Surgeon: Ruiz Chu MD;  Location: [facility] OR    HEAD & NECK SURGERY      12/2017 at [facility].     IMPLANT GOLD WEIGHT EYELID Right 11/16/2017    Procedure: IMPLANT WEIGHT EYELID;  Right Upper Eyelid Weight, right tarsal strip lower eyelid;  Surgeon: Milana Malave MD;  Location: [facility] OR    IR CHEMO EMBOLIZATION  01/22/2019    KNEE SURGERY Left     ORTHOPEDIC SURGERY      PAROTIDECTOMY, RADICAL NECK DISSECTION Right  2017    Procedure: PAROTIDECTOMY, RADICAL NECK DISSECTION;  Right Superfacial Parotidectomy , Facial nerve repair. with [facility] facial nerve monitor.;  Surgeon: Asiya Morgan MD;  Location: [facility] OR    PHACOEMULSIFICATION CLEAR CORNEA WITH STANDARD INTRAOCULAR LENS IMPLANT Right 2023    Procedure: PHACOEMULSIFICATION, COMPLEX CATARACT, WITH INTRAOCULAR LENS IMPLANT WITH TRYPAN RIGHT EYE;  Surgeon: Enriqueta Martin MD;  Location: [facility] OR    PHACOEMULSIFICATION WITH STANDARD INTRAOCULAR LENS IMPLANT Left 2023    Procedure: PHACOEMULSIFICATION, COMPLEX CATARACT, WITH STANDARD INTRAOCULAR LENS IMPLANT INSERTION LEFT EYE;  Surgeon: Enriqueta Martin MD;  Location: [facility] OR    PICC INSERTION Left 2017    4fr SL BioFlo PICC, 44cm in the L basilic vein w/ tip in the low SVC    RETURN LIVER TRANSPLANT N/A 2019    Procedure: Exploratory laparotomy, hematoma evacuation, abdominal washout;  Surgeon: Александр Ramos MD;  Location: [facility] OR    TRANSPLANT LIVER RECIPIENT  DONOR N/A 2019    Procedure: TRANSPLANT, LIVER, RECIPIENT,  DONOR;  Surgeon: Александр Ramos MD;  Location: [facility] OR    VASCULAR SURGERY         Family History   Problem Relation Age of Onset    Skin Cancer Mother     Cancer Mother         mastectomy    Diabetes Mother          3/2016    Cerebrovascular Disease Mother         Passed away in Feb of this year, 80 years old.    Thyroid Disease Mother     Depression Mother     Breast Cancer Mother     Obesity Mother         280 pounds  from this and complications from D    Pancreatic Cancer Father 60    Prostate Cancer Father         Currently in Remission    Macular Degeneration Father     Glaucoma Father     Skin Cancer Father     Coronary Artery Disease Father         Started in his 70's  at 88 in [place]     Colon Cancer Father     Thyroid Disease Sister     Depression Sister     Asthma Sister     Sleep Apnea Brother     Colorectal  Cancer Maternal Grandmother     Cancer Maternal Grandmother     Substance Abuse Maternal Grandmother         Alcohol    Prostate Cancer Maternal Grandfather     Substance Abuse Maternal Grandfather         Alcohol    Colorectal Cancer Paternal Grandmother     Asthma Sister         Had since birth    Liver Disease No family hx of     Melanoma No family hx of     Anesthesia Reaction No family hx of     Deep Vein Thrombosis (DVT) No family hx of        Social History     Socioeconomic History    Marital status:     Highest education level: Bachelor's degree (e.g., BA, AB, BS)   Tobacco Use    Smoking status: Former     Packs/day: 6.00     Years: 30.00     Pack years: 180.00     Types: Cigars, Cigarettes     Start date: 2016     Quit date: 10/25/2017     Years since quittin.0    Smokeless tobacco: Former     Types: Chew     Quit date: 10/31/2017    Tobacco comments:     1 tin per week   Substance and Sexual Activity    Alcohol use: No     Alcohol/week: 0.0 standard drinks     Comment: quit 1996    Drug use: No    Sexual activity: Not Currently     Partners: Female     Birth control/protection: Condom   Other Topics Concern    Parent/sibling w/ CABG, MI or angioplasty before 65F 55M? Yes   Social History Narrative    Prior , [employer]     Social Determinants of Health     Intimate Partner Violence: Not At Risk    Fear of Current or Ex-Partner: No    Emotionally Abused: No    Physically Abused: No    Sexually Abused: No       Current Outpatient Medications   Medication Sig Dispense Refill    acetaminophen (TYLENOL) 325 MG tablet TAKE 1 TABLET BY MOUTH EVERY SIX HOURS AS NEEDED FOR MILD PAIN 100 tablet 3    albuterol (PROAIR HFA/PROVENTIL HFA/VENTOLIN HFA) 108 (90 Base) MCG/ACT inhaler Inhale 2 puffs into the lungs every 4 hours as needed for shortness of breath, wheezing or cough 18 g 1    Alcohol Swabs (ALCOHOL PREP) 70 % PADS Use for glucose testing 4x daily  and insulin administration 4x  daily. 400 each 1    ammonium lactate (AMLACTIN) 12 % external cream APPLY TOPICALLY DAILY AS NEEDED FOR DRY SKIN (ON FEET) 140 g 5    amoxicillin-clavulanate (AUGMENTIN) 875-125 MG tablet Take 1 tablet by mouth 2 times daily 20 tablet 0    apixaban ANTICOAGULANT (ELIQUIS ANTICOAGULANT) 5 MG tablet Take 1 tablet (5 mg) by mouth 2 times daily 60 tablet 3    benzoyl peroxide 5 % external liquid Use topically in showers as a body wash 226 g 9    blood glucose (NO BRAND SPECIFIED) lancets standard Use to test blood sugar 1 times daily or as directed. 100 each 11    Continuous Blood Gluc Sensor (FREESTYLE CLAUDIA 2 SENSOR) MISC 1 each See Admin Instructions Change every 14 days. 7 each 3    empagliflozin (JARDIANCE) 10 MG TABS tablet Take 1 tablet (10 mg) by mouth daily 30 tablet 11    insulin aspart (NOVOLOG PEN) 100 UNIT/ML pen Inject 5-10 Units Subcutaneous 4 times daily (with meals and nightly) 1unit : 8 g carb before meals.  Also add 1 unit : 50 mg/dl >180 before meals and at bedtime. Approx 45 units daily. 45 mL 1    insulin degludec (TRESIBA FLEXTOUCH) 100 UNIT/ML pen Inject 15 units subcutaneous once daily 45 mL 3    insulin pen needle (ULTICARE MICRO) 32G X 4 MM miscellaneous USE 5 PER  each 3    K-Phos-Neutral 155-852-130 MG tablet Take 1 tablet by mouth 4 times daily 120 tablet 1    ketoconazole (NIZORAL) 2 % external shampoo Apply thin layer topically to scalp in shower (leave on 5 min prior to rinse); may also use as a body wash 120 mL 11    lamiVUDine (EPIVIR) 100 MG tablet Take 1 tablet (100 mg) by mouth daily 90 tablet 3    lisinopril (ZESTRIL) 5 MG tablet Take 1 tablet (5 mg) by mouth daily 90 tablet 3    loperamide (IMODIUM A-D) 2 MG tablet Take 1-2 tablets (2-4 mg) by mouth 4 times daily as needed for diarrhea 120 tablet 3    loperamide (IMODIUM) 2 MG capsule TAKE ONE TO TWO CAPSULES BY MOUTH FOUR TIMES A DAY AS NEEDED FOR DIARRHEA. 120 capsule 2    magnesium oxide 400 MG tablet TAKE 1 TABLET BY  MOUTH DAILY 30 tablet 5    metoprolol succinate ER (TOPROL XL) 25 MG 24 hr tablet Take 1 tablet (25 mg) by mouth daily 45 tablet 1    Multiple Vitamin (DAILY-GLADIS MULTIVITAMIN) TABS TAKE 1 TABLET BY MOUTH ONCE DAILY 30 tablet 11    mycophenolate (GENERIC EQUIVALENT) 250 MG capsule TAKE TWO CAPSULES BY MOUTH EVERY 12 HOURS 120 capsule 11    NIFEdipine ER OSMOTIC (PROCARDIA XL) 60 MG 24 hr tablet TAKE 1 TABLET(60 MG) BY MOUTH TWICE DAILY 6 tablet 0    omega 3 1000 MG CAPS Take 2,000 mg by mouth 2 times daily 120 capsule 10    ondansetron (ZOFRAN ODT) 8 MG ODT tab Take 1 tablet (8 mg) by mouth every 8 hours as needed for nausea 15 tablet 3    pantoprazole (PROTONIX) 40 MG EC tablet TAKE 1 TABLET BY MOUTH ONCE DAILY BEFORE BREAKFAST 90 tablet 3    predniSONE (DELTASONE) 5 MG tablet TAKE ONE TABLET BY MOUTH ONCE DAILY 30 tablet 11    rosuvastatin (CRESTOR) 20 MG tablet Take 1 tablet (20 mg) by mouth daily 90 tablet 3    sodium zirconium cyclosilicate (LOKELMA) 10 g PACK packet Take 10 gram 3x/day for 2 days then 10 grams daily. (Patient taking differently: Take 10 g by mouth daily Take 10 gram 3x/day for 2 days then 10 grams daily.) 30 packet 11    SORAfenib (NEXAVAR) 200 MG tablet Take 1 tablet (200 mg) by mouth 2 times daily On an empty stomach 1 hour before or 2 hours after a meal. 60 tablet 0    SORAfenib (NEXAVAR) 200 MG tablet Take 1 tablet (200 mg) by mouth 2 times daily On an empty stomach 1 hour before or 2 hours after a meal. 60 tablet 0    tamsulosin (FLOMAX) 0.4 MG capsule Take 1 capsule (0.4 mg) by mouth daily 90 capsule 3    vitamin D3 (CHOLECALCIFEROL) 50 mcg (2000 units) tablet TAKE 1 TABLET BY MOUTH ONCE DAILY 30 tablet 8     Current Facility-Administered Medications   Medication Dose Route Frequency Provider Last Rate Last Admin    aflibercept (EYLEA) injection prefilled syringe 2 mg  2 mg Intravitreal Q28 Days Enriqueta Martin MD   2 mg at 05/31/23 1144    dexamethasone (OZURDEX) intravitreal  implant 0.7 mg  0.7 mg Intravitreal Q2 Months Enriqueta Martin MD        dexamethasone (OZURDEX) intravitreal implant 0.7 mg  0.7 mg Intravitreal Q2 Months Enriqueta Martin MD [facility] intravitreal injection 6 mg  6 mg Intravitreal q28 days Enriqueta Martin MD faricimab-svoa ([facility]) intravitreal injection 6 mg  6 mg Intravitreal q28 days Enriqueta Martin MD faricimab-svoa ([facility]) intravitreal injection 6 mg  6 mg Intravitreal q28 days Enriqueta Martin MD   6 mg at 03/06/24 1001    lidocaine (PF) (XYLOCAINE) injection 1 mL  1 mL Ophthalmic Q2 Months Enriqueta Martin MD              Allergies   Allergen Reactions    Codeine Other (See Comments)     Cannot take due to liver  Cannot tolerate oral narcotics    Seasonal Allergies      Sneezing, coughing, runny and itchy eyes       Physical Exam  /80   Pulse 66   Wt 70.8 kg (156 lb)   SpO2 100%   BMI 23.04 kg/m    GENERAL:  Alert and oriented X3, NAD, well dressed, answering questions appropriately, appears thin.  CV: RRR, no murmurs  LUNGS: CTAB, no wheezes, rales, or ronchi  EXTREMITIES: no edema, +pulses, no rashes, no lesions  NEUROLOGY: did not examine, as he will see podiatry today.     RESULTS  Lab Results   Component Value Date    A1C 5.7 (H) 12/18/2023    A1C 6.3 (H) 06/14/2023    A1C 6.9 (H) 12/14/2022    A1C 6.0 (H) 01/10/2022    A1C 6.8 (H) 07/13/2021    A1C 6.0 (H) 12/30/2020    A1C 6.3 (H) 06/13/2020    A1C 6.6 (H) 08/08/2018    A1C 6.5 (H) 06/09/2017    A1C 7.8 (H) 10/25/2016    HEMOGLOBINA1 4.8 03/04/2020    HEMOGLOBINA1 5.1 12/02/2019    HEMOGLOBINA1 5.4 08/14/2019    HEMOGLOBINA1 6.1 (A) 02/11/2019    HEMOGLOBINA1 8.1 (A) 04/11/2018       TSH   Date Value Ref Range Status   04/25/2022 1.24 0.40 - 4.00 mU/L Final   10/04/2021 1.90 0.40 - 4.00 mU/L Final   01/28/2021 0.91 0.40 - 4.00 mU/L Final   01/24/2020 1.91 0.40 - 4.00 mU/L Final   08/08/2018 0.49 0.40 -  4.00 mU/L Final   02/08/2018 0.71 0.40 - 4.00 mU/L Final   06/09/2017 0.42 0.40 - 4.00 mU/L Final     T4 Free   Date Value Ref Range Status   08/08/2018 1.21 0.76 - 1.46 ng/dL Final       ALT   Date Value Ref Range Status   05/06/2024 22 0 - 70 U/L Final     Comment:     Reference intervals for this test were updated on 6/12/2023 to more accurately reflect our healthy population. There may be differences in the flagging of prior results with similar values performed with this method. Interpretation of those prior results can be made in the context of the updated reference intervals.     04/17/2024 28 0 - 70 U/L Final     Comment:     Reference intervals for this test were updated on 6/12/2023 to more accurately reflect our healthy population. There may be differences in the flagging of prior results with similar values performed with this method. Interpretation of those prior results can be made in the context of the updated reference intervals.     06/28/2021 20 0 - 70 U/L Final   06/14/2021 21 0 - 70 U/L Final   ]    Recent Labs   Lab Test 07/28/23  1046 06/14/23  0924 12/14/22  0847 09/07/21  1025   CHOL 178 194   < > 176   HDL 34* 37*   < > 34*   LDL  --  78  --  95   TRIG 405* 394*   < > 233*    < > = values in this interval not displayed.       Lab Results   Component Value Date     05/06/2024     06/28/2021      Lab Results   Component Value Date    POTASSIUM 5.1 05/06/2024    POTASSIUM 7.7 08/10/2023    POTASSIUM 4.7 07/20/2022    POTASSIUM 4.6 06/28/2021     Lab Results   Component Value Date    CHLORIDE 105 05/06/2024    CHLORIDE 105 07/20/2022    CHLORIDE 107 06/28/2021     Lab Results   Component Value Date    DEBORAH 9.2 05/06/2024    DEBORAH 9.1 06/28/2021     Lab Results   Component Value Date    CO2 21 05/06/2024    CO2 26 07/20/2022    CO2 25 06/28/2021     Lab Results   Component Value Date    BUN 31.9 05/06/2024    BUN 32 07/20/2022    BUN 33 06/28/2021     Lab Results   Component Value Date     CR 1.66 05/06/2024    CR 1.62 06/28/2021       GFR Estimate   Date Value Ref Range Status   05/06/2024 47 (L) >60 mL/min/1.73m2 Final   04/17/2024 50 (L) >60 mL/min/1.73m2 Final   03/13/2024 58 (L) >60 mL/min/1.73m2 Final   06/28/2021 46 (L) >60 mL/min/[1.73_m2] Final     Comment:     Non  GFR Calc  Starting 12/18/2018, serum creatinine based estimated GFR (eGFR) will be   calculated using the Chronic Kidney Disease Epidemiology Collaboration   (CKD-EPI) equation.     06/14/2021 49 (L) >60 mL/min/[1.73_m2] Final     Comment:     Non  GFR Calc  Starting 12/18/2018, serum creatinine based estimated GFR (eGFR) will be   calculated using the Chronic Kidney Disease Epidemiology Collaboration   (CKD-EPI) equation.     06/04/2021 46 (L) >60 mL/min/[1.73_m2] Final     Comment:     Non  GFR Calc  Starting 12/18/2018, serum creatinine based estimated GFR (eGFR) will be   calculated using the Chronic Kidney Disease Epidemiology Collaboration   (CKD-EPI) equation.       GFR, ESTIMATED POCT   Date Value Ref Range Status   01/30/2024 46 (L) >60 mL/min/1.73m2 Final   07/28/2023 28 (L) >60 mL/min/1.73m2 Final   06/14/2023 38 (L) >60 mL/min/1.73m2 Final     GFR Estimate If Black   Date Value Ref Range Status   06/28/2021 54 (L) >60 mL/min/[1.73_m2] Final     Comment:      GFR Calc  Starting 12/18/2018, serum creatinine based estimated GFR (eGFR) will be   calculated using the Chronic Kidney Disease Epidemiology Collaboration   (CKD-EPI) equation.     06/14/2021 57 (L) >60 mL/min/[1.73_m2] Final     Comment:      GFR Calc  Starting 12/18/2018, serum creatinine based estimated GFR (eGFR) will be   calculated using the Chronic Kidney Disease Epidemiology Collaboration   (CKD-EPI) equation.     06/04/2021 53 (L) >60 mL/min/[1.73_m2] Final     Comment:      GFR Calc  Starting 12/18/2018, serum creatinine based estimated GFR (eGFR) will be  "  calculated using the Chronic Kidney Disease Epidemiology Collaboration   (CKD-EPI) equation.         Lab Results   Component Value Date    MICROL 644.0 04/30/2024    MICROL 47 04/20/2022    MICROL 563 02/26/2021     No results found for: \"MICROALBUMIN\"  No results found for: \"CPEPT\", \"GADAB\", \"ISCAB\"    Vitamin B12   Date Value Ref Range Status   08/15/2023 491 232 - 1,245 pg/mL Final   01/11/2022 2,179 (H) 193 - 986 pg/mL Final   06/13/2020 682 193 - 986 pg/mL Final   02/04/2019 3,006 (H) 193 - 986 pg/mL Final   ]    Most recent eye exam date: : Not Found       Assessment/Plan:     1.  Type 2 diabetes- Doing well with glucose control (A1c 6.5%), but he has poor appetite and is losing weight.  Discussed the importance of optimizing his nutrition and we will work around his insulin regimen if his glucose rises.  Encouraged him to try full calorie, instead of low calorie shakes/supplements if he is not eating regularly.  Encouraged him to reach out if glucose climbs high with these changes, and we can adjust his insulin.  Offered referral to dietitian.  Please let me know if you are having low blood sugars less than 70 or over 250 mg/dL.      2.  Risk factors-     Retinopathy:  Yes.  Had eye exam within last year.   Nephropathy:  BP historically well controlled.+ albuminuria.  Creatinine stable.   Neuropathy: Yes.   Feet: OK, no ulcers. +neuropathy Sees podiatry today. Wears diabetic shoe.   Lipids:  TG were high at last check. Goal LDL <70. Patient taking rosuvastatin     3.  F/U in 6 months, sooner with concerns.    22 minutes spent on the date of the encounter doing chart review, review of test results, review and interpretation of glucose data, patient visit and documentation, counseling/coordination of care, and discussion of follow up plan for worsening hyper and hypoglycemia.  The patient understood and is satisfied with today's visit.          Frannie Vasquez PA-C, MPAS   [facility]  Department of " Medicine  Division of Endocrinology and Diabetes

## 2024-05-13 NOTE — PATIENT INSTRUCTIONS
Focus on eating whatever you want and simply covering the carbs.     Need to improve your nutrition.      Follow up with Dr. Ortiz about your cough.     Please let me know if you are having low blood sugars less than 70 or over 250 mg/dL.      If you have concerns, please send me a V[employer] message M-Th, call the clinic at [phone], or call [phone] after hours/weekends and ask to speak with the endocrinologist on call.

## 2024-05-13 NOTE — PROGRESS NOTES
Past Medical History:   Diagnosis Date    Anemia 2013    Arthritis     BPH (benign prostatic hyperplasia)     CAD (coronary artery disease) 04/01/2019    Cholelithiasis     Conductive hearing loss 08/16/2017    Depressive disorder 1986    Suffer effects throughout life    Gastroesophageal reflux disease 12/01/2014    HCC (hepatocellular carcinoma) (H) 01/22/2019    History of blood transfusion 2019    Had blood transfussion until Dec 2022    History of diabetic retinopathy 07/2018    HTN (hypertension) 11/20/2019    Hyperlipidemia     Liver cirrhosis secondary to ESTRADA (H)     Liver transplanted (H) 11/11/2019    Portal vein thrombosis 04/11/2019    SCC (squamous cell carcinoma) 02/15/2023    02/15/23:  Left temporal scalp    Type II diabetes mellitus (H)      Patient Active Problem List   Diagnosis    Bipolar affective disorder in remission (H24)    Esophageal varices determined by endoscopy (H)    Erectile dysfunction due to diseases classified elsewhere    HCC (hepatocellular carcinoma) (H)    Equivocal stress echocardiogram    Type 2 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, unspecified whether long term insulin use (H)    Status post coronary angiogram    CAD (coronary artery disease)    Liver transplant recipient (H)    Status post liver transplantation (H)    Immunosuppressed status (H24)    Benign essential hypertension    Anemia of chronic renal failure, stage 3a (H)    History of coronary artery disease    Dyslipidemia    Excessive sweating    Stage 3b chronic kidney disease (H)    Anemia of chronic renal failure, stage 3b (H)    NSTEMI (non-ST elevated myocardial infarction) (H)    Chest pain, unspecified type    Hypertensive chronic kidney disease with stage 5 chronic kidney disease or end stage renal disease (H)    Vitreous hemorrhage of left eye (H)    Proliferative diabetic retinopathy of both eyes associated with type 2 diabetes mellitus, unspecified  proliferative retinopathy type (H)    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)    Liver replaced by transplant (H)    Hyperkalemia    Acute renal failure, unspecified acute renal failure type (H24)    ARIELA (obstructive sleep apnea)    History of nonmelanoma skin cancer    Neoplasm of unspecified behavior of bone, soft tissue, and skin    SCC (squamous cell carcinoma)     Past Surgical History:   Procedure Laterality Date    ABDOMEN SURGERY  11/11/2019    Had Liver Transplant    BIOPSY  12/2022    Had biopsy done will have additional procedure 2/15/2023    BYPASS GRAFT ARTERY CORONARY N/A 07/14/2021    Procedure: median sternotomy, on cardiopulmonary bypass, CORONARY ARTERY BYPASS GRAFT (CABG) x2 with left greater saphenous vein endoscopic harvest and left internal mammery artery harvest;  Surgeon: Tom Zapata MD;  Location: [facility] OR    CARDIAC SURGERY  7/2021    Heart Graph. and bypass surgery    CHOLECYSTECTOMY  11/11/2022    Removed with Liver Transplant    COLONOSCOPY      2015    COLONOSCOPY N/A 12/06/2019    Procedure: COLONOSCOPY, WITH POLYPECTOMY AND BIOPSY;  Surgeon: Adam Morton MD;  Location:  GI    CV CENTRAL VENOUS CATHETER PLACEMENT N/A 07/12/2021    Procedure: Central Venous Catheter Placement;  Surgeon: Fermin Polanco MD;  Location: [facility] CARDIAC CATH LAB    CV CORONARY ANGIOGRAM N/A 07/12/2021    Procedure: Coronary Angiogram;  Surgeon: Fermin Polanco MD;  Location: [facility] CARDIAC CATH LAB    CV HEART CATHETERIZATION WITH POSSIBLE INTERVENTION N/A 02/26/2019    Procedure: CORS;  Surgeon: Jagdish Hoyt MD;  Location: [facility] CARDIAC CATH LAB    CV INTRA AORTIC BALLOON N/A 07/12/2021    Procedure: Intra Aortic Balloon Pump Insertion;  Surgeon: Fermin Polanco MD;  Location: [facility] CARDIAC CATH LAB    ESOPHAGOSCOPY, GASTROSCOPY, DUODENOSCOPY (EGD), COMBINED N/A 11/17/2016    Procedure: COMBINED ESOPHAGOSCOPY,  GASTROSCOPY, DUODENOSCOPY (EGD);  Surgeon: Santi Rosas MD;  Location: [facility] GI    ESOPHAGOSCOPY, GASTROSCOPY, DUODENOSCOPY (EGD), COMBINED N/A 11/17/2017    Procedure: COMBINED ESOPHAGOSCOPY, GASTROSCOPY, DUODENOSCOPY (EGD);  EGD;  Surgeon: Santi Rosas MD;  Location: [facility] GI    ESOPHAGOSCOPY, GASTROSCOPY, DUODENOSCOPY (EGD), COMBINED N/A 12/28/2018    Procedure: EGD;  Surgeon: Santi Rosas MD;  Location: [facility]    ESOPHAGOSCOPY, GASTROSCOPY, DUODENOSCOPY (EGD), COMBINED N/A 12/06/2019    Procedure: ESOPHAGOGASTRODUODENOSCOPY, WITH BIOPSY;  Surgeon: Adam Morton MD;  Location: [facility] GI    ESOPHAGOSCOPY, GASTROSCOPY, DUODENOSCOPY (EGD), COMBINED N/A 02/13/2020    Procedure: ESOPHAGOGASTRODUODENOSCOPY (EGD);  Surgeon: Santi Rosas MD;  Location: [facility] GI    EXCISE MASS ABDOMEN N/A 07/13/2023    Procedure: Laparoscopic lysis of adhesions, laparoscopic resection of abdominal mass;  Surgeon: Ruiz Chu MD;  Location:  OR    HEAD & NECK SURGERY      12/2017 at [facility].     IMPLANT GOLD WEIGHT EYELID Right 11/16/2017    Procedure: IMPLANT WEIGHT EYELID;  Right Upper Eyelid Weight, right tarsal strip lower eyelid;  Surgeon: Milana Malave MD;  Location: [facility]    IR CHEMO EMBOLIZATION  01/22/2019    KNEE SURGERY Left     ORTHOPEDIC SURGERY      PAROTIDECTOMY, RADICAL NECK DISSECTION Right 11/02/2017    Procedure: PAROTIDECTOMY, RADICAL NECK DISSECTION;  Right Superfacial Parotidectomy , Facial nerve repair. with [facility] [place] facial nerve monitor.;  Surgeon: Asiya Morgan MD;  Location: [facility] OR    PHACOEMULSIFICATION CLEAR CORNEA WITH STANDARD INTRAOCULAR LENS IMPLANT Right 01/24/2023    Procedure: PHACOEMULSIFICATION, COMPLEX CATARACT, WITH INTRAOCULAR LENS IMPLANT WITH TRYPAN RIGHT EYE;  Surgeon: Enriqueta Martin MD;  Location: [facility] OR    PHACOEMULSIFICATION WITH STANDARD INTRAOCULAR LENS IMPLANT Left 01/03/2023    Procedure: PHACOEMULSIFICATION, COMPLEX CATARACT, WITH  STANDARD INTRAOCULAR LENS IMPLANT INSERTION LEFT EYE;  Surgeon: Enriqueta Martin MD;  Location: [facility] OR    PICC INSERTION Left 2017    4fr SL BioFlo PICC, 44cm in the L basilic vein w/ tip in the low SVC    RETURN LIVER TRANSPLANT N/A 2019    Procedure: Exploratory laparotomy, hematoma evacuation, abdominal washout;  Surgeon: Александр Ramos MD;  Location: [facility] OR    TRANSPLANT LIVER RECIPIENT  DONOR N/A 2019    Procedure: TRANSPLANT, LIVER, RECIPIENT,  DONOR;  Surgeon: Александр Ramos MD;  Location: [facility] OR    VASCULAR SURGERY       Social History     Socioeconomic History    Marital status:      Spouse name: Not on file    Number of children: Not on file    Years of education: Not on file    Highest education level: Bachelor's degree (e.g., BA, AB, BS)   Occupational History    Not on file   Tobacco Use    Smoking status: Former     Types: Dip, chew, snus or snuff     Passive exposure: Past    Smokeless tobacco: Former     Types: Chew     Quit date: 10/31/2017    Tobacco comments:     Quit [place] after Doctor told me in 2017 that if I didn't I would   Vaping Use    Vaping status: Never Used   Substance and Sexual Activity    Alcohol use: No     Comment: quit 1996    Drug use: No    Sexual activity: Not Currently     Partners: Female     Birth control/protection: Condom   Other Topics Concern    Parent/sibling w/ CABG, MI or angioplasty before 65F 55M? No   Social History Narrative    Prior , [facility]     Social Determinants of Health     Financial Resource Strain: Low Risk  (2024)    Financial Resource Strain     Within the past 12 months, have you or your family members you live with been unable to get utilities (heat, electricity) when it was really needed?: No   Food Insecurity: Low Risk  (2024)    Food Insecurity     Within the past 12 months, did you worry that your food would run out before you got money to buy more?: No      Within the past 12 months, did the food you bought just not last and you didn t have money to get more?: No   Transportation Needs: Low Risk  (2024)    Transportation Needs     Within the past 12 months, has lack of transportation kept you from medical appointments, getting your medicines, non-medical meetings or appointments, work, or from getting things that you need?: No   Physical Activity: Insufficiently Active (10/13/2020)    Exercise Vital Sign     Days of Exercise per Week: 1 day     Minutes of Exercise per Session: 60 min   Stress: Stress Concern Present (10/13/2020)    [place] [employer] - Occupational Stress Questionnaire     Feeling of Stress : To some extent   Social Connections: Socially Isolated (10/13/2020)    Social Connection and Isolation Panel [NHANES]     Frequency of Communication with Friends and Family: Once a week     Frequency of Social Gatherings with Friends and Family: Once a week     Attends Muslim Services: Never     Active Member of Clubs or Organizations: No     Attends Club or Organization Meetings: Never     Marital Status:    Interpersonal Safety: Low Risk  (2024)    Interpersonal Safety     Do you feel physically and emotionally safe where you currently live?: Yes     Within the past 12 months, have you been hit, slapped, kicked or otherwise physically hurt by someone?: No     Within the past 12 months, have you been humiliated or emotionally abused in other ways by your partner or ex-partner?: No   Housing Stability: Low Risk  (2024)    Housing Stability     Do you have housing? : Yes     Are you worried about losing your housing?: No     Family History   Problem Relation Age of Onset    Skin Cancer Mother     Cancer Mother         mastectomy    Diabetes Mother          3/2016    Cerebrovascular Disease Mother         Passed away in Feb of this year, 80 years old.    Thyroid Disease Mother     Depression Mother     Breast  "Cancer Mother     Obesity Mother         280 pounds  from this and complications from D    Pancreatic Cancer Father 60    Prostate Cancer Father         Currently in Remission    Macular Degeneration Father     Glaucoma Father     Skin Cancer Father     Coronary Artery Disease Father         Started in his 70's  at 88 in 2023    Colon Cancer Father     Thyroid Disease Sister     Depression Sister     Asthma Sister     Sleep Apnea Brother     Colorectal Cancer Maternal Grandmother     Cancer Maternal Grandmother     Substance Abuse Maternal Grandmother         Alcohol    Prostate Cancer Maternal Grandfather     Substance Abuse Maternal Grandfather         Alcohol    Colorectal Cancer Paternal Grandmother     Asthma Sister         Had since birth    Liver Disease No family hx of     Melanoma No family hx of     Anesthesia Reaction No family hx of     Deep Vein Thrombosis (DVT) No family hx of        Lab Results   Component Value Date    A1C 5.6 2024    A1C 5.7 2023    A1C 6.3 2023    A1C 6.9 2022    A1C 6.0 01/10/2022    A1C 6.8 2021    A1C 6.0 2020    A1C 6.3 2020    A1C 6.6 2018    A1C 6.5 2017    A1C 7.8 10/25/2016     Lab Results   Component Value Date    WBC 4.5 2024    WBC 4.4 2021     Lab Results   Component Value Date    RBC 3.36 2024    RBC 2.35 2021     Lab Results   Component Value Date    HGB 10.2 2024    HGB 7.3 2021     Lab Results   Component Value Date    HCT 32.6 2024    HCT 22.6 2021     No components found for: \"MCT\"  Lab Results   Component Value Date    MCV 97 2024    MCV 96 2021     Lab Results   Component Value Date    MCH 30.4 2024    MCH 31.1 2021     Lab Results   Component Value Date    MCHC 31.3 2024    MCHC 32.3 2021     Lab Results   Component Value Date    RDW 15.5 2024    RDW 15.1 2021     Lab Results   Component Value Date "     05/06/2024     06/28/2021     Last Comprehensive Metabolic Panel:  Lab Results   Component Value Date     05/06/2024    POTASSIUM 5.1 05/06/2024    CHLORIDE 105 05/06/2024    CO2 21 (L) 05/06/2024    ANIONGAP 12 05/06/2024     (H) 05/06/2024    BUN 31.9 (H) 05/06/2024    CR 1.66 (H) 05/06/2024    GFRESTIMATED 47 (L) 05/06/2024    DEBORAH 9.2 05/06/2024             Subjective findings- 59-year-old returns clinic for diabetic foot cares.  Relates he is doing relatively well, relates to no ulcers or sores since we seen him last, relates he has Diabetic shoes, relates to numbness tingling and Neuropathy in his feet, relates he needs his toenails cut, relates he has been doing walking exercise.      Objective findings- DP and PT are 2 out of 4 bilaterally.  Has dorsally contracted digits bilaterally.  He has incurvated nails with some of them broken off With thickening dystrophy and subungual debris digits during degrees bilaterally.  He has diffuse hyperkeratotic tissue buildup plantar MPJs bilaterally.  There is no erythema, no drainage, no odor, no calor, no pain on palpation bilaterally.    Assessment and plan- Onychauxis and Onychomycosis bilaterally, Diabetes with peripheral Neuropathy with foot deformity and callus.  Diagnosis and treatment options discussed with the patient.  Advised him on moisturizing lotion use.  All the toenails were debrided or reduced bilaterally upon consent.  Continue diabetic shoes.  Previous notes reviewed.  Return to clinic and see me in 2 to 3 months.                            Moderate level of medical decision making.

## 2024-05-14 RX ORDER — TAMSULOSIN HYDROCHLORIDE 0.4 MG/1
0.4 CAPSULE ORAL DAILY
Qty: 30 CAPSULE | Refills: 11 | Status: SHIPPED | OUTPATIENT
Start: 2024-05-14

## 2024-05-15 NOTE — TELEPHONE ENCOUNTER
LVD:  4/30/2024  [facility] Lamin Parker MD  Family Medicine     Refilled per protocol.

## 2024-05-18 NOTE — PROGRESS NOTES
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   He used to live in [place], but moved to [place] to be closer to his daughter, although he seems to be estranged from her now, as well as the rest of his family.  He has cirrhosis felt secondary to ESTRADA, and says that he was on the transplant list at [place] when he lived in [place].  He follows with Dr. Banuelos here in the hepatology clinic now.  He underwent routine screening ultrasound on 12/10/18, which showed a non-specific left hepatic lobe lesion.  MRI abdomen on 12/23/19 showed 2 lesions, measuring 3.1 cm and 1.1 cm in hepatic segments 4b and 3, respectively, that were LIRADS 5.  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      He underwent TACE on 1/22/19 to segment IV lesion and CT-guided microwave ablation on 1/23/19 to segment III lesion.    He is on liver transplant list.      He does not smoke, but used to chew tobacco.  He had a parotid gland mass removed previously that was benign.  He does not drink alcohol.    He notes that he has no family or friends here.  He is estranged from his family and daughter.  He notes that his neighbor can give him rides to appointments.          INTERIM HISTORY: Miller is here for follow-up today.  He says that he feels well, other than feeling tired.  He says that he hasn't exercised as much, but will try to walk more.      REVIEW OF SYSTEMS:   14 point ROS was reviewed and is negative other than as noted above in HPI.       HOME MEDICATIONS:  Current Outpatient Medications   Medication Sig Dispense Refill     alpha-lipoic acid 600 MG capsule Take 1 capsule (600 mg) by mouth daily 90 capsule 3     Artificial  Tear Solution (SM ARTIFICIAL TEARS) SOLN Place 1 drop into the right eye every hour as needed Apply at least 4 times daily and as needed for dry eye 1 Bottle 3     aspirin (ASA) 81 MG EC tablet Take 1 tablet (81 mg) by mouth daily 90 tablet 3     BD VIKTORIA U/F 32G X 4 MM insulin pen needle Use 5 per day 300 each 3     blood glucose monitoring (ACCU-CHEK EDINSON PLUS) test strip USE TO TEST BLOOD SUGARS FOUR TIMES DAILY OR AS DIRECTED 400 strip 3     blood glucose monitoring (ACCU-CHEK EDINSON PLUS) test strip USE TO TEST BLOOD SUGARS FOUR TIMES DAILY OR AS DIRECTED 300 strip 3     blood glucose monitoring (ACCU-CHEK EDINSON PLUS) test strip Use to test blood sugar 4 times daily 400 each 3     blood glucose monitoring (ACCU-CHEK FASTCLIX) lancets Use to test blood sugar 4 times daily or as directed.  1 box = 102 lancets 408 each 3     carvedilol (COREG) 12.5 MG tablet TAKE 1 TABLET(12.5 MG) BY MOUTH TWICE DAILY WITH MEALS 180 tablet 3     Cholecalciferol (VITAMIN D-3 PO) Take 2,000 Units by mouth every morning        cyanocobalamin (RA VITAMIN B-12 TR) 1000 MCG TBCR Take 5,000 mcg by mouth every morning        dapagliflozin (FARXIGA) 10 MG TABS tablet Take 1 tablet (10 mg) by mouth daily 90 tablet 3     diclofenac (VOLTAREN) 1 % topical gel Place 2 g onto the skin 4 times daily 100 g 3     econazole nitrate 1 % external cream APPLY TOPICALLY DAILY TO FEET AND TOENAILS 85 g 0     ferrous sulfate (FEROSUL) 325 (65 Fe) MG tablet Take 325 mg by mouth 3 times daily (with meals)        insulin degludec (TRESIBA) 200 UNIT/ML pen Take 100 units daily. (Patient taking differently: Take 90 units daily.) 100 mL 3     insulin pen needle (BD VIKTORIA U/F) 32G X 4 MM miscellaneous USE 5 PER  each 2     lactulose (CHRONULAC) 10 GM/15ML solution Take 45 mLs (30 g) by mouth 4 times daily 5000 mL 11     metFORMIN (GLUCOPHAGE-XR) 500 MG 24 hr tablet Take 1 tablet (500 mg) by mouth daily (with breakfast) 90 tablet 1     Multiple Vitamin  (THERAVITE PO) Take 1 tablet by mouth every morning        NOVOLOG FLEXPEN 100 UNIT/ML soln INJECT 1 UNIT PER 4 GRAMS OF CARBS AT MEALS AND SNACKS. CORRECTION SCALE OF 1 UNITS PER 25 OVER [address] DOSE 75 UNITERS PER DAY 30 mL 3     omeprazole (PRILOSEC) 40 MG DR capsule TAKE 1 CAPSULE(40 MG) BY MOUTH DAILY 90 capsule 0     potassium chloride ER (K-DUR/KLOR-CON M) 20 MEQ CR tablet Take 1 tablet (20 mEq) by mouth daily 90 tablet 3     rifaximin (XIFAXAN) 550 MG TABS tablet TAKE 1 TABLET(550 MG) BY MOUTH TWICE DAILY 60 tablet 3     rosuvastatin (CRESTOR) 20 MG tablet Take 1 tablet (20 mg) by mouth daily 60 tablet 2     tamsulosin (FLOMAX) 0.4 MG capsule TAKE 1 CAPSULE(0.4 MG) BY MOUTH DAILY 90 capsule 0     traZODone (DESYREL) 50 MG tablet Take 1 tablet (50 mg) by mouth At Bedtime 90 tablet 1     ACCU-CHEK EDINSON PLUS test strip USE TO TEST BLOOD SUGARS FOUR TIMES DAILY OR AS DIRECTED 150 strip 11     furosemide (LASIX) 20 MG tablet Take 1 tablet (20 mg) by mouth daily 90 tablet 0     spironolactone (ALDACTONE) 50 MG tablet Take 1 tablet (50 mg) by mouth daily 90 tablet 0         ALLERGIES:  Allergies   Allergen Reactions     Codeine Other (See Comments)     Cannot take due to liver  Cannot tolerate oral narcotics     Seasonal Allergies      Sneezing, coughing, runny and itchy eyes         PAST MEDICAL HISTORY:  Past Medical History:   Diagnosis Date     Anemia 2013    Low blood plates current is 37     Arthritis      BPH (benign prostatic hyperplasia)      CAD (coronary artery disease) 4/1/2019     Cholelithiasis      Conductive hearing loss 8/16/2017    Have a lump on my right side of my face.  Had wax discharge     Depressive disorder 1986    Suffer effects throughout life     Gastroesophageal reflux disease 12/1/2014    Being treated with Prilosac     HCC (hepatocellular carcinoma) (H) 1/22/2019     History of diabetic retinopathy 07/2018     HTN (hypertension)      Hyperlipidemia      Liver cirrhosis secondary to  ESTRADA (H)      Portal vein thrombosis 4/11/2019     Type II diabetes mellitus (H)     Insulin adminstered BID daily.          PAST SURGICAL HISTORY:  Past Surgical History:   Procedure Laterality Date     COLONOSCOPY      2015     CV HEART CATHETERIZATION WITH POSSIBLE INTERVENTION N/A 2/26/2019    Procedure: CORS;  Surgeon: Jagdish Hoyt MD;  Location:  HEART CARDIAC CATH LAB     ESOPHAGOSCOPY, GASTROSCOPY, DUODENOSCOPY (EGD), COMBINED N/A 11/17/2016    Procedure: COMBINED ESOPHAGOSCOPY, GASTROSCOPY, DUODENOSCOPY (EGD);  Surgeon: Santi Rosas MD;  Location:  GI     ESOPHAGOSCOPY, GASTROSCOPY, DUODENOSCOPY (EGD), COMBINED N/A 11/17/2017    Procedure: COMBINED ESOPHAGOSCOPY, GASTROSCOPY, DUODENOSCOPY (EGD);  EGD;  Surgeon: Santi Rosas MD;  Location:  GI     ESOPHAGOSCOPY, GASTROSCOPY, DUODENOSCOPY (EGD), COMBINED N/A 12/28/2018    Procedure: EGD;  Surgeon: Santi Rosas MD;  Location:  OR     HEAD & NECK SURGERY      12/2017 at [facility].      IMPLANT GOLD WEIGHT EYELID Right 11/16/2017    Procedure: IMPLANT WEIGHT EYELID;  Right Upper Eyelid Weight, right tarsal strip lower eyelid;  Surgeon: Milana Malave MD;  Location:  OR     IR CHEMO EMBOLIZATION  1/22/2019     KNEE SURGERY Left      ORTHOPEDIC SURGERY       PAROTIDECTOMY, RADICAL NECK DISSECTION Right 11/2/2017    Procedure: PAROTIDECTOMY, RADICAL NECK DISSECTION;  Right Superfacial Parotidectomy , Facial nerve repair. with [facility] facial nerve monitor.;  Surgeon: Asiya Morgan MD;  Location: [facility] OR     PICC INSERTION Left 11/06/2017    4fr SL BioFlo PICC, 44cm in the L basilic vein w/ tip in the low SVC     VASCULAR SURGERY           SOCIAL HISTORY:  Social History     Socioeconomic History     Marital status:      Spouse name: Not on file     Number of children: Not on file     Years of education: Not on file     Highest education level: Not on file   Occupational History     Not on file   Social  Needs     Financial resource strain: Not on file     Food insecurity:     Worry: Not on file     Inability: Not on file     Transportation needs:     Medical: Not on file     Non-medical: Not on file   Tobacco Use     Smoking status: Never Smoker     Smokeless tobacco: Former User     Types: Chew     Tobacco comment: 1 tin per week   Substance and Sexual Activity     Alcohol use: No     Alcohol/week: 0.0 oz     Comment: quit 1996     Drug use: No     Sexual activity: Not Currently     Partners: Female     Birth control/protection: Condom   Lifestyle     Physical activity:     Days per week: Not on file     Minutes per session: Not on file     Stress: Not on file   Relationships     Social connections:     Talks on phone: Not on file     Gets together: Not on file     Attends Baptism service: Not on file     Active member of club or organization: Not on file     Attends meetings of clubs or organizations: Not on file     Relationship status: Not on file     Intimate partner violence:     Fear of current or ex partner: Not on file     Emotionally abused: Not on file     Physically abused: Not on file     Forced sexual activity: Not on file   Other Topics Concern     Parent/sibling w/ CABG, MI or angioplasty before 65F 55M? Yes   Social History Narrative     Not on file         FAMILY HISTORY:  Family History   Problem Relation Age of Onset     Prostate Cancer Maternal Grandfather      Substance Abuse Maternal Grandfather         Alcohol     Colon Cancer Father 60     Pancreatic Cancer Father 60     Prostate Cancer Father      Colorectal Cancer Father      Macular Degeneration Father      Cancer Father      Glaucoma Father      Skin Cancer Father      Colorectal Cancer Maternal Grandmother      Cancer Maternal Grandmother      Substance Abuse Maternal Grandmother         Alcohol     Colorectal Cancer Paternal Grandmother      Cancer Mother      Diabetes Mother          3/2016     Cerebrovascular Disease  "Mother         Passed away in Feb of this year, 80 years old.     Thyroid Disease Mother      Depression Mother      Asthma Sister         Had since birth     Thyroid Disease Sister      Depression Sister      Liver Disease No family hx of      Melanoma No family hx of          PHYSICAL EXAM:  Vital signs:  /72 (BP Location: Right arm, Patient Position: Sitting, Cuff Size: Adult Large)   Pulse 70   Temp 97.8  F (36.6  C) (Oral)   Resp 18   Ht 1.753 m (5' 9.02\")   Wt 83.5 kg (184 lb 1.4 oz)   SpO2 99%   BMI 27.17 kg/m     ECO  GENERAL/CONSTITUTIONAL: No acute distress.  EYES: No scleral icterus.  RESPIRATORY: Clear to auscultation bilaterally. No crackles or wheezing.   CARDIOVASCULAR: Regular rate and rhythm without murmurs, gallops, or rubs.  GASTROINTESTINAL: No tenderness. The patient has normal bowel sounds. No guarding.    MUSCULOSKELETAL: Warm and well-perfused.  NEUROLOGIC: Alert, oriented, answers questions appropriately.  INTEGUMENTARY: No jaundice.      LABS:  CBC RESULTS:   Recent Labs   Lab Test 19  1226   WBC 3.2*   RBC 3.33*   HGB 12.2*   HCT 36.6*   *   MCH 36.6*   MCHC 33.3   RDW 14.1   PLT 34*     Recent Labs   Lab Test 19  1226 19  1020    141   POTASSIUM 4.2 3.6   CHLORIDE 114* 110*   CO2 25 24   ANIONGAP 3 8   GLC 80 131*   BUN 23 27   CR 1.35* 1.25   DEBORAH 8.7 8.9     Lab Results   Component Value Date    AST 52 2019     Lab Results   Component Value Date    ALT 52 2019     No results found for: BILICONJ   Lab Results   Component Value Date    BILITOTAL 1.1 2019     Lab Results   Component Value Date    ALBUMIN 2.9 2019     Lab Results   Component Value Date    PROTTOTAL 6.9 2019      Lab Results   Component Value Date    ALKPHOS 134 2019     INR 1.31    Component      Latest Ref Rng & Units 2018   Alpha Fetoprotein      0 - 8 ug/L 5.2 4.2 5.5 4.3         IMAGING:  MRI abdomen " 7/18/19:  1a. Posttreatment changes in hepatic segments 4a and 4b without any  evidence of recurrent disease. LIRADS TR nonviable.    1b. Posttreatment changes in hepatic segment 3 with more conspicuous  area of arterial enhancement along its inferior margin with no washout  or pseudocapsule, however with restricted diffusion, indeterminate,  could be related to posttreatment changes. Attention on follow-up  studies.  2. Cirrhotic configuration of the liver parenchyma with sequela of  portal hypertension including splenomegaly, gastroesophageal varices  mild colonic wall edema and splenorenal shunts. Small volume of  ascites, slightly increased from the prior study.  3. Moderate increased right pleural effusion with overlying  atelectasis.  4. Stable nonocclusive bland appearing SMV thrombosis.  5. Based on this exam alone, the patient is within Andrew criteria.  6. Cholelithiasis.      ASSESSMENT/PLAN:  Frandy Workman is a 53 year old male with:    1) Hepatocellular carcinoma: Child-Clark A (6).  Patient has a 3.1 cm lesion in segment 4b and 1.1 cm lesion in segment 3.  His CT chest and bone scan were negative for metastatic disease.    He underwent TACE on 1/22/19 to segment IV lesion and CT-guided microwave ablation on 1/23/19 to segment III lesion.    MRI abdomen on 7/18/19 shows post-treatment changes in hepatic segments 4a and 4b without evidence of recurrent disease.  There are post-treatment changes in segment 3, which is indeterminate, but could be related to post-treatment changes.  Will follow-up on this on the next scan.  There is moderate increased right pleural effusion, stable non-occlusive bland appearing SMV thrombus, changes of portal hypertension.    He is on liver transplant list.  He no longer follows with IR    -MRI and labs/AFP in 3 months  -RTC in 3 months    2) Cirrhosis: secondary to ESTRADA  -follows with hepatology - Dr. Banuelos    3) Thrombocytopenia: This has been a chronic issue and likely  secondary to his history of liver disease.  Platelet count at baseline.    4) Diabetes:   -follows with endocrinology      I spent a total of 25 minutes with the patient, with over >50% of the time in counseling and/or coordination of care.       Amanda Lees MD  Hematology/Oncology  [facility]     General

## 2024-05-28 NOTE — PROGRESS NOTES
[facility]: Integrated Behavioral Health  February 27, 2024        Behavioral Health Clinician Progress Note    Patient Name: Frandy Workman           Service Type: Video visit      Service Location:  Video visit      Session Start Time: 3:12 Session End Time: 3:46      Session Length: 38 - 52      Attendees: Patient    Visit Activities (Refresh list every visit): [facility] Only     Service Modality:  Video Visit:      Provider verified identity through the following two step process.  Patient provided:  Patient photo and Patient is known previously to provider    Telemedicine Visit: The patient's condition can be safely assessed and treated via synchronous audio and visual telemedicine encounter.      Reason for Telemedicine Visit: Services only offered telehealth    Originating Site (Patient Location): Patient's home    Distant Site (Provider Location): Provider Remote Setting- Home Office    Consent:  The patient/guardian has verbally consented to: the potential risks and benefits of telemedicine (video visit) versus in person care; bill my insurance or make self-payment for services provided; and responsibility for payment of non-covered services.     Patient would like the video invitation sent by:  My Chart    Mode of Communication:  Video Conference via [employer]    Distant Location (Provider):  Off-site    As the provider I attest to compliance with applicable laws and regulations related to telemedicine.      Diagnostic Assessment Date:  12/03/2020  Treatment Plan Review Date:  5/13/2024  See Flowsheets for today's PHQ-9 and LIZZETTE-7 results  Previous PHQ-9:       12/12/2023     2:55 PM 1/25/2024    10:23 AM 2/27/2024     3:08 PM   PHQ-9 SCORE   PHQ-9 Total Score MyChart 2 (Minimal depression) 2 (Minimal depression) 2 (Minimal depression)   PHQ-9 Total Score 2 2 2     Previous LIZZETTE-7:       4/7/2021    12:34 PM 9/30/2021     1:45 PM 5/17/2023     3:37 PM   LIZZETTE-7 SCORE   Total Score 9  (mild anxiety)     Total Score 9 10 6      11/7/2017  5:23 AM 3/22/2022  1:49 PM 3/21/2023  1:43 PM 6/22/2023  9:09 AM   PROMIS-10 Scores Only       Global Mental Health Score 14  9  9  13    Global Physical Health Score 13  10  9  13    PROMIS TOTAL - SUBSCORES 27  19  18  26       9/14/2023  9:45 AM   PROMIS-10 Scores Only    Global Mental Health Score 15    Global Physical Health Score 12    PROMIS TOTAL - SUBSCORES 27            Extended Session (60+ minutes): No  Interactive Complexity: No  Crisis: No    Treatment Objective(s) Addressed in This Session:  Target Behavior(s): disease management/lifestyle changes   related to adaptive approaches to managing anxious distress and mood difficulties    Medication issue    Decrease negative self-talk    Help facilitate acceptance    Current Stressors / Issues:  [facility] met with Miller today for a follow-up, to help Miller continue to feel supported in his health behavior change and overall mental health.      Discussed therapy progress and learned experiences as we terminated services for now, given my resignation from [employer]. Discussed Miller's openness to connecting with another psychologist/counselor for therapy moving forward, something he says historically he would not have been open to, but found our sessions helpful. We talked a bit about an opportunity to clarify his diagnosis of bipolar I disorder, reviewing his history with possible manic episodes and depression. Miller notes he would be open to meeting with another provider to perhaps clarify it more. Miller was thankful for the services and we processed our mutual goodbyes. Placed referral for [facility].    Progress on Treatment Objective(s) / Homework:  Stable - ACTION (Actively working towards change); Intervened by reinforcing change plan / affirming steps taken      Solution-Focused Therapy  Explore patterns in patient's relationships and discuss options for new behaviors.  Explore patterns in patient's actions and choices  and discuss options for new behaviors.    Behavioral Activation  Discuss steps patient can take to become more involved in meaningful activity.  Identify barriers to these activities and explore possible solutions.    Acceptance and Commitment Therapy  Explore and identify important values in patient's life.  Discuss ways to commit to behavioral activation around these values.      Answers for HPI/ROS submitted by the patient on 3/21/2022  If you checked off any problems, how difficult have these problems made it for you to do your work, take care of things at home, or get along with other people?: Not difficult at all  PHQ9 TOTAL SCORE: 6      Answers for HPI/ROS submitted by the patient on 2/8/2022  If you checked off any problems, how difficult have these problems made it for you to do your work, take care of things at home, or get along with other people?: Somewhat difficult  PHQ9 TOTAL SCORE: 4      Answers for HPI/ROS submitted by the patient on 4/7/2021   LIZZETTE 7 TOTAL SCORE: 9  If you checked off any problems, how difficult have these problems made it for you to do your work, take care of things at home, or get along with other people?: Very difficult  PHQ9 TOTAL SCORE: 7*    *No SI    Answers for HPI/ROS submitted by the patient on 10/13/2020   If you checked off any problems, how difficult have these problems made it for you to do your work, take care of things at home, or get along with other people?: Somewhat difficult  PHQ9 TOTAL SCORE: 8*  LIZZETTE 7 TOTAL SCORE: 6      *No SI     Answers for HPI/ROS submitted by the patient on 12/29/2020   If you checked off any problems, how difficult have these problems made it for you to do your work, take care of things at home, or get along with other people?: Somewhat difficult  PHQ9 TOTAL SCORE: 5*  LIZZETTE 7 TOTAL SCORE: 8    *No SI     Answers for HPI/ROS submitted by the patient on 11/21/2022  If you checked off any problems, how difficult have these problems made it  for you to do your work, take care of things at home, or get along with other people?: Very difficult  PHQ9 TOTAL SCORE: 4          Care Plan review completed: no    Medication Review:  No changes to current psychiatric medication(s)     Reports discontinuing zolpidem.       Current Outpatient Medications   Medication    acetaminophen (TYLENOL) 325 MG tablet    albuterol (PROAIR HFA/PROVENTIL HFA/VENTOLIN HFA) 108 (90 Base) MCG/ACT inhaler    Alcohol Swabs (ALCOHOL PREP) 70 % PADS    ammonium lactate (AMLACTIN) 12 % external cream    benzoyl peroxide 5 % external liquid    blood glucose (NO BRAND SPECIFIED) lancets standard    Continuous Blood Gluc Sensor (FREESTYLE CLAUDIA 2 SENSOR) MISC    ELIQUIS ANTICOAGULANT 5 MG tablet    empagliflozin (JARDIANCE) 10 MG TABS tablet    insulin aspart (NOVOLOG PEN) 100 UNIT/ML pen    insulin degludec (TRESIBA FLEXTOUCH) 100 UNIT/ML pen    insulin pen needle (ULTICARE MICRO) 32G X 4 MM miscellaneous    K-Phos-Neutral [id number] MG tablet    ketoconazole (NIZORAL) 2 % external shampoo    lamiVUDine (EPIVIR) 100 MG tablet    lisinopril (ZESTRIL) 5 MG tablet    loperamide (IMODIUM A-D) 2 MG tablet    loperamide (IMODIUM) 2 MG capsule    magnesium oxide (MAG-OX) 400 MG tablet    metoprolol succinate ER (TOPROL XL) 25 MG 24 hr tablet    Multiple Vitamin (DAILY-GLADIS MULTIVITAMIN) TABS    mycophenolate (GENERIC EQUIVALENT) 250 MG capsule    NIFEdipine ER OSMOTIC (PROCARDIA XL) 60 MG 24 hr tablet    ondansetron (ZOFRAN ODT) 8 MG ODT tab    pantoprazole (PROTONIX) 40 MG EC tablet    predniSONE (DELTASONE) 5 MG tablet    rosuvastatin (CRESTOR) 20 MG tablet    sodium zirconium cyclosilicate (LOKELMA) 10 g PACK packet    SORAfenib (NEXAVAR) 200 MG tablet    SORAfenib (NEXAVAR) 200 MG tablet    tamsulosin (FLOMAX) 0.4 MG capsule    Vitamin D3 50 mcg (2000 units) tablet     Current Facility-Administered Medications   Medication    aflibercept (EYLEA) injection prefilled syringe 2 mg     aflibercept (EYLEA) injection prefilled syringe 2 mg    dexamethasone (OZURDEX) intravitreal implant 0.7 mg    dexamethasone (OZURDEX) intravitreal implant 0.7 mg    faricimab-svoa (VABYSMO) intravitreal injection 6 mg    faricimab-svoa (VABYSMO) intravitreal injection 6 mg    faricimab-svoa (VABYSMO) intravitreal injection 6 mg    lidocaine (PF) (XYLOCAINE) injection 1 mL       Medication Compliance:  Yes    Changes in Health Issues:   None reported    Chemical Use Review:   Substance Use: Chemical use reviewed, no active concerns identified      Tobacco Use: No current tobacco use.         Assessment: Current Emotional / Mental Status (status of significant symptoms):  Risk status (Self / Other harm or suicidal ideation)  Patient denies a history of suicidal ideation, suicide attempts, self-injurious behavior, homicidal ideation, homicidal behavior and and other safety concerns     Patient denies current fears or concerns for personal safety.  Patient denies current or recent suicidal ideation or behaviors.  Patient denies current or recent homicidal ideation or behaviors.  Patient denies current or recent self injurious behavior or ideation.  Patient denies other safety concerns.     A safety and risk management plan has not been developed at this time, however patient was encouraged to call [place] [phone] should there be a change in any of these risk factors.    [place] Suicide Severity Rating Scale (Short Version)      7/1/2020    11:00 AM 7/12/2021     2:30 PM 1/10/2022     9:28 PM 1/3/2023     6:31 AM 1/24/2023     6:22 AM 7/13/2023    10:31 AM 8/10/2023     3:31 PM   [place] Suicide Severity Rating (Short Version)   Over the past 2 weeks have you felt down, depressed, or hopeless? no no no no no no no   Over the past 2 weeks have you had thoughts of killing yourself? no no no no  no no   Have you ever attempted to kill yourself? no no no no  no no   Q1 Wished to be Dead (Past Month)    no      Q2  Suicidal Thoughts (Past Month)    no      Q3 Suicidal Thought Method    no      Q4 Suicidal Intent without Specific Plan    no      Q5 Suicide Intent with Specific Plan    no      Q6 Suicide Behavior (Lifetime)    no      Level of Risk per Screen    low risk            Appearance:   Appropriate   Eye Contact:   Good   Psychomotor Behavior: TD evident - improving  Attitude:   Cooperative  Interested Friendly Pleasant  Orientation:   All  Speech   Rate / Production: Normal/ Responsive   Volume:  Normal   Mood:    Depressed ; improving  Affect:    Appropriate    Thought Content:  Clear   Thought Form:  Goal Directed  Logical   Insight:    Good     Diagnoses:  1. Bipolar 1 disorder, depressed, mild (H)    2. Generalized anxiety disorder                    Collateral Reports Completed:  Not Applicable    Plan: (Homework, other):  Continue with this writer for individual psychotherapy in 2/3 wks. He will continue to work on managing depression and anxiety through achievable behavioral activation ideas.Information about grief management given today.  No CD issues.     Joseph Murillo Psy.D,    Behavioral Health Clinician   [facility]          ______________________________________________________________________                                            Individual Treatment Plan    Patient's Name: Frandy Workman  YOB: 1965    Date of Creation: 3/1/2022  Date Treatment Plan Last Reviewed/Revised: 2/13/2024    DSM5 Diagnoses: 296.51 Bipolar I Disorder Current or Most Recent Episode Depressed, Mild or 300.02 (F41.1) Generalized Anxiety Disorder  Psychosocial / Contextual Factors: Post Solid Organ Tx  PROMIS (reviewed every 90 days):     11/7/2017  5:23 AM 3/22/2022  1:49 PM 3/21/2023  1:43 PM 6/22/2023  9:09 AM   PROMIS-10 Scores Only       Global Mental Health Score 14  9  9  13    Global Physical Health Score 13  10  9  13    PROMIS TOTAL - SUBSCORES 27  19  18  26        9/14/2023  9:45 AM   PROMIS-10 Scores Only    Global Mental Health Score 15    Global Physical Health Score 12    PROMIS TOTAL - SUBSCORES 27          Referral / Collaboration:  Referral to another professional/service is not indicated at this time..    Anticipated number of session for this episode of care: 4-6  Anticipation frequency of session: Every other week  Anticipated Duration of each session: 38-52 minutes  Treatment plan will be reviewed in 90 days or when goals have been changed.       MeasurableTreatment Goal(s) related to diagnosis / functional impairment(s)  Goal 1: Patient will experience a reduction in depressive symptoms along with a corresponding increase in positive emotion and life satisfaction.      Objective #A (Patient Action)    Patient will Increase interest, engagement, and pleasure in doing things.  Status: Continued - Date(s): 2/13/2024    Intervention(s)  Therapist will help patient identify pleasant and mastery oriented events that elicit positive, relaxed mood.    Objective #B  Patient will Decrease frequency and intensity of feeling down, depressed, hopeless.  Status: Continued - Date(s): 2/13/2024    Intervention(s)  Therapist will introduce patient to cognitive-behavioral and acceptance and commitment therapy topics aimed to help reduce depression and anxiety    Objective #C  Patient will Identify negative self-talk and behaviors: challenge core beliefs, myths, and actions  Improve concentration, focus, and mindfulness in daily activities .  Status: Continued - Date(s): COMPLETE    Intervention(s)  Therapist will help patient identify and manage negative self-talk and automatic thoughts; introduce patient to cognitive distortions; help patient develop cognitive diffusion techniques      Goal 2: Patient will experience a reduction in anxious symptoms, along with a corresponding increase in relaxed emotional states and life satisfaction.      Objective #A (Patient Action)  Patient  "will use cognitive-behavioral and thought diffusion strategies identified in therapy to challenge anxious thoughts.    Status: Continued - Date(s): COMPLETE    Intervention(s)  Therapist will utilize CBT and ACT ideas to help patient challenge anxious thoughts and reduce intensity/duration of anxious distress    Objective #B  Patient will use \"worry time\" each day for 15 minutes of scheduled worry and then defer obsessive or anxious thinking until the next structured worry time.    Status: Continued - Date(s): COMPLETE    Intervention(s)  Therapist will teach patient how to effectively utilize worry time and/or thought logs/journals each day and incorporate more relaxation behaviors into their routine.    Objective #C  Patient will identify the stressors which contribute to feelings of anxiety  Patient will increase engagement in adaptive coping skills and recreational activities, such as exercise and healthy socialization, to manage distress.  Status: Continued - Date(s): COMPLETE    Intervention(s)  Therapist will help patient identify triggers/situational factors that contribute to anxiety and behavioral skills aimed to manage anxious distress.      Goal 3: Patiient will work toward adaptively managing bipolar-related depression and manic episodes      Objective #A (Patient Action)    Status: Continued - Date(s): COMPLETE    Patient will identify 2-3 signs or signals of emerging mood instability.    Intervention(s)  Therapist will provide educational materials on bipolar disorder and help identifying triggers for mood instability .    Objective #B  Patient will identify 2-3 strategies for more effectively managing Bipolar Disorder.    Status: Continued - Date(s): COMPLETE    Intervention(s)  Therapist will teach emotional recognition/identification. Skills aimed to effectively manage bipolar disorder .      Other Possible Therapeutic Intervention(s):    Psycho-education regarding mental health diagnoses and " Statement Selected treatment options    Supportive Therapy  Provide affirmations, reflections, and establish working rapport  Emphasize and reflect on strength of therapeutic relationship    Skills training  Explore skills useful to client in current situation.  Skills include assertiveness, communication, conflict management, problem-solving, relaxation, etc.    Solution-Focused Therapy  Explore patterns in patient's relationships and discuss options for new behaviors.  Explore patterns in patient's actions and choices and discuss options for new behaviors.    Cognitive-behavioral Therapy  Discuss common cognitive distortions, identify them in patient's life.  Explore ways to challenge, replace, and act against these cognitions.    Acceptance and Commitment Therapy  Explore and identify important values in patient's life.  Discuss ways to commit to behavioral activation around these values.    Psychodynamic psychotherapy  Discuss patient's emotional dynamics and issues and how they impact behaviors.  Explore patient's history of relationships and how they impact present behaviors.  Explore how to work with and make changes in these schemas and patterns.    Narrative Therapy  Explore the patient's story of his/her life from his/her perspective.  Explore alternate ways of understanding their experience, identifying exceptions, developing new themes.    Interpersonal Psychotherapy  Explore patterns in relationships that are effective or ineffective at helping patient reach their goals, find satisfying experience.  Discuss new patterns or behaviors to engage in for improved social functioning.    Behavioral Activation  Discuss steps patient can take to become more involved in meaningful activity.  Identify barriers to these activities and explore possible solutions.    Mindfulness-Based Strategies  Discuss skills based on development and application of mindfulness.  Skills drawn from compassion-focused therapy, dialectical behavior  therapy, mindfulness-based stress reduction, mindfulness-based cognitive therapy, etc.      Patient has reviewed and agreed to the above plan.      Mateo [email or web address], LP   Behavioral Health Clinician   -[facility]     2/13/2024

## 2024-05-29 ENCOUNTER — MYC MEDICAL ADVICE (OUTPATIENT)
Dept: NEPHROLOGY | Facility: CLINIC | Age: 59
End: 2024-05-29
Payer: MEDICARE

## 2024-06-03 DIAGNOSIS — Z95.1 S/P CABG (CORONARY ARTERY BYPASS GRAFT): ICD-10-CM

## 2024-06-03 RX ORDER — METOPROLOL SUCCINATE 25 MG/1
25 TABLET, EXTENDED RELEASE ORAL DAILY
Qty: 30 TABLET | Refills: 3 | Status: SHIPPED | OUTPATIENT
Start: 2024-06-03

## 2024-06-04 ENCOUNTER — LAB (OUTPATIENT)
Dept: LAB | Facility: CLINIC | Age: 59
End: 2024-06-04
Attending: INTERNAL MEDICINE
Payer: MEDICARE

## 2024-06-04 DIAGNOSIS — Z13.220 LIPID SCREENING: ICD-10-CM

## 2024-06-04 DIAGNOSIS — Z94.4 LIVER REPLACED BY TRANSPLANT (H): ICD-10-CM

## 2024-06-04 DIAGNOSIS — Z79.899 ENCOUNTER FOR LONG-TERM (CURRENT) USE OF MEDICATIONS: ICD-10-CM

## 2024-06-04 DIAGNOSIS — C22.0 HCC (HEPATOCELLULAR CARCINOMA) (H): ICD-10-CM

## 2024-06-04 LAB
ALBUMIN MFR UR ELPH: 61.7 MG/DL
ALBUMIN SERPL BCG-MCNC: 4.3 G/DL (ref 3.5–5.2)
ALBUMIN UR-MCNC: 20 MG/DL
ALP SERPL-CCNC: 87 U/L (ref 40–150)
ALT SERPL W P-5'-P-CCNC: 19 U/L (ref 0–70)
ANION GAP SERPL CALCULATED.3IONS-SCNC: 12 MMOL/L (ref 7–15)
APPEARANCE UR: CLEAR
AST SERPL W P-5'-P-CCNC: 22 U/L (ref 0–45)
BASOPHILS # BLD AUTO: 0 10E3/UL (ref 0–0.2)
BASOPHILS NFR BLD AUTO: 0 %
BILIRUB SERPL-MCNC: 0.5 MG/DL
BILIRUB UR QL STRIP: NEGATIVE
BUN SERPL-MCNC: 24.6 MG/DL (ref 8–23)
CALCIUM SERPL-MCNC: 8.9 MG/DL (ref 8.8–10.2)
CHLORIDE SERPL-SCNC: 103 MMOL/L (ref 98–107)
CHOLEST SERPL-MCNC: 180 MG/DL
COLOR UR AUTO: ABNORMAL
CREAT SERPL-MCNC: 1.3 MG/DL (ref 0.67–1.17)
CREAT UR-MCNC: 17.8 MG/DL
DEPRECATED HCO3 PLAS-SCNC: 22 MMOL/L (ref 22–29)
EGFRCR SERPLBLD CKD-EPI 2021: 63 ML/MIN/1.73M2
EOSINOPHIL # BLD AUTO: 0.1 10E3/UL (ref 0–0.7)
EOSINOPHIL NFR BLD AUTO: 1 %
ERYTHROCYTE [DISTWIDTH] IN BLOOD BY AUTOMATED COUNT: 16.2 % (ref 10–15)
FASTING STATUS PATIENT QL REPORTED: YES
FASTING STATUS PATIENT QL REPORTED: YES
GLUCOSE SERPL-MCNC: 159 MG/DL (ref 70–99)
GLUCOSE UR STRIP-MCNC: >=1000 MG/DL
HCT VFR BLD AUTO: 35.5 % (ref 40–53)
HDLC SERPL-MCNC: 43 MG/DL
HGB BLD-MCNC: 11.2 G/DL (ref 13.3–17.7)
HGB UR QL STRIP: ABNORMAL
IMM GRANULOCYTES # BLD: 0 10E3/UL
IMM GRANULOCYTES NFR BLD: 1 %
KETONES UR STRIP-MCNC: NEGATIVE MG/DL
LDLC SERPL CALC-MCNC: 94 MG/DL
LEUKOCYTE ESTERASE UR QL STRIP: NEGATIVE
LYMPHOCYTES # BLD AUTO: 0.7 10E3/UL (ref 0.8–5.3)
LYMPHOCYTES NFR BLD AUTO: 9 %
MAGNESIUM SERPL-MCNC: 1.8 MG/DL (ref 1.7–2.3)
MCH RBC QN AUTO: 30.3 PG (ref 26.5–33)
MCHC RBC AUTO-ENTMCNC: 31.5 G/DL (ref 31.5–36.5)
MCV RBC AUTO: 96 FL (ref 78–100)
MONOCYTES # BLD AUTO: 0.3 10E3/UL (ref 0–1.3)
MONOCYTES NFR BLD AUTO: 5 %
NEUTROPHILS # BLD AUTO: 5.8 10E3/UL (ref 1.6–8.3)
NEUTROPHILS NFR BLD AUTO: 84 %
NITRATE UR QL: NEGATIVE
NONHDLC SERPL-MCNC: 137 MG/DL
NRBC # BLD AUTO: 0 10E3/UL
NRBC BLD AUTO-RTO: 0 /100
PH UR STRIP: 5.5 [PH] (ref 5–7)
PHOSPHATE SERPL-MCNC: 2.7 MG/DL (ref 2.5–4.5)
PLATELET # BLD AUTO: 167 10E3/UL (ref 150–450)
POTASSIUM SERPL-SCNC: 4.4 MMOL/L (ref 3.4–5.3)
PROT SERPL-MCNC: 6.9 G/DL (ref 6.4–8.3)
PROT/CREAT 24H UR: 3.47 MG/MG CR (ref 0–0.2)
RBC # BLD AUTO: 3.7 10E6/UL (ref 4.4–5.9)
RBC URINE: 1 /HPF
SODIUM SERPL-SCNC: 137 MMOL/L (ref 135–145)
SP GR UR STRIP: 1 (ref 1–1.03)
TRIGL SERPL-MCNC: 217 MG/DL
UROBILINOGEN UR STRIP-MCNC: NORMAL MG/DL
WBC # BLD AUTO: 6.9 10E3/UL (ref 4–11)
WBC URINE: 3 /HPF

## 2024-06-04 PROCEDURE — 83735 ASSAY OF MAGNESIUM: CPT | Performed by: PATHOLOGY

## 2024-06-04 PROCEDURE — 36415 COLL VENOUS BLD VENIPUNCTURE: CPT | Performed by: PATHOLOGY

## 2024-06-04 PROCEDURE — 84100 ASSAY OF PHOSPHORUS: CPT | Performed by: PATHOLOGY

## 2024-06-04 PROCEDURE — 85025 COMPLETE CBC W/AUTO DIFF WBC: CPT | Performed by: PATHOLOGY

## 2024-06-04 PROCEDURE — 80061 LIPID PANEL: CPT | Performed by: PATHOLOGY

## 2024-06-04 PROCEDURE — 80053 COMPREHEN METABOLIC PANEL: CPT | Performed by: PATHOLOGY

## 2024-06-04 PROCEDURE — 84156 ASSAY OF PROTEIN URINE: CPT | Performed by: PATHOLOGY

## 2024-06-04 PROCEDURE — 81001 URINALYSIS AUTO W/SCOPE: CPT | Performed by: PATHOLOGY

## 2024-06-05 ENCOUNTER — MYC MEDICAL ADVICE (OUTPATIENT)
Dept: ONCOLOGY | Facility: CLINIC | Age: 59
End: 2024-06-05
Payer: MEDICARE

## 2024-06-05 NOTE — ORAL ONC MGMT
Oral Chemotherapy Monitoring Program  Lab Follow Up    Patient is on sorafenib  Reviewed lab results from 6/4/25.    Assessment & Plan:  CBC, CMP, magnesium, phosphorus reviewed. Results show no concerning abnormalities.  Plan to continue sorafenib as prescribed. [employer] message sent to patient regarding results and plan.     Follow-Up:  7/2: next labs    Refill:  Plan routed to Dr Villarreal for signature, then will release next refill to pharmacy as soon as able.      Fallon Coello, PharmD  Hematology/Oncology Clinical Pharmacist  Oral Chemotherapy Monitoring Program  [facility]  [phone]  June 5, 2024 2/13/2024     2:00 PM 3/13/2024    10:00 AM 4/11/2024    12:00 PM 4/17/2024     3:00 PM 5/7/2024     6:00 PM 5/9/2024     9:00 AM 6/5/2024     8:00 AM   ORAL CHEMOTHERAPY   Assessment Type Refill Lab Monitoring Refill Lab Monitoring Lab Monitoring Refill Lab Monitoring   Diagnosis Code Hepatocellular Cancer Hepatocellular Cancer Hepatocellular Cancer Hepatocellular Cancer Hepatocellular Cancer Hepatocellular Cancer Hepatocellular Cancer   Providers Dr. Cherelle Villarreal   [facility]   Is this patient followed by the [facility] team? No No No No No No No   Drug Name Nexavar (sorafenib) Nexavar (sorafenib) Nexavar (sorafenib) Nexavar (sorafenib) Nexavar (sorafenib) Nexavar (sorafenib) Nexavar (sorafenib)   Dose 200 mg 200 mg 200 mg 200 mg 200 mg 200 mg 200 mg   Current Schedule BID BID BID BID BID BID BID   Cycle Details Continuous Continuous Continuous Continuous Continuous Continuous Continuous   Adverse Effects       No AE identified during assessment   Is the dose as ordered appropriate for the patient?       Yes       Labs:  _  Result Component Current Result Ref Range   Sodium 137 (6/4/2024) 135 - 145 mmol/L     _  Result Component Current Result Ref Range   Potassium 4.4  (6/4/2024) 3.4 - 5.3 mmol/L     _  Result Component Current Result Ref Range   Calcium 8.9 (6/4/2024) 8.8 - 10.2 mg/dL     _  Result Component Current Result Ref Range   Magnesium 1.8 (6/4/2024) 1.7 - 2.3 mg/dL     _  Result Component Current Result Ref Range   Phosphorus 2.7 (6/4/2024) 2.5 - 4.5 mg/dL     _  Result Component Current Result Ref Range   Albumin 4.3 (6/4/2024) 3.5 - 5.2 g/dL     _  Result Component Current Result Ref Range   Urea Nitrogen 24.6 (H) (6/4/2024) 8.0 - 23.0 mg/dL     _  Result Component Current Result Ref Range   Creatinine 1.30 (H) (6/4/2024) 0.67 - 1.17 mg/dL     _  Result Component Current Result Ref Range   AST 22 (6/4/2024) 0 - 45 U/L     _  Result Component Current Result Ref Range   ALT 19 (6/4/2024) 0 - 70 U/L     _  Result Component Current Result Ref Range   Bilirubin Total 0.5 (6/4/2024) <=1.2 mg/dL     _  Result Component Current Result Ref Range   WBC Count 6.9 (6/4/2024) 4.0 - 11.0 10e3/uL     _  Result Component Current Result Ref Range   Hemoglobin 11.2 (L) (6/4/2024) 13.3 - 17.7 g/dL     _  Result Component Current Result Ref Range   Platelet Count 167 (6/4/2024) 150 - 450 10e3/uL     No results found for ANC within last 30 days.

## 2024-06-09 DIAGNOSIS — C22.0 HCC (HEPATOCELLULAR CARCINOMA) (H): Primary | ICD-10-CM

## 2024-06-09 DIAGNOSIS — C78.6 MALIGNANT NEOPLASM METASTATIC TO PERITONEUM (H): ICD-10-CM

## 2024-06-11 DIAGNOSIS — C22.0 HCC (HEPATOCELLULAR CARCINOMA) (H): Primary | ICD-10-CM

## 2024-06-11 DIAGNOSIS — C78.6 MALIGNANT NEOPLASM METASTATIC TO PERITONEUM (H): ICD-10-CM

## 2024-06-11 RX ORDER — SORAFENIB 200 MG/1
200 TABLET, FILM COATED ORAL 2 TIMES DAILY
Qty: 60 TABLET | Refills: 0 | Status: SHIPPED | OUTPATIENT
Start: 2024-06-12 | End: 2024-08-06

## 2024-07-02 ENCOUNTER — LAB (OUTPATIENT)
Dept: LAB | Facility: CLINIC | Age: 59
End: 2024-07-02
Attending: INTERNAL MEDICINE
Payer: MEDICARE

## 2024-07-02 DIAGNOSIS — I12.0 HYPERTENSIVE CHRONIC KIDNEY DISEASE WITH STAGE 5 CHRONIC KIDNEY DISEASE OR END STAGE RENAL DISEASE (H): Primary | ICD-10-CM

## 2024-07-02 DIAGNOSIS — C22.0 HCC (HEPATOCELLULAR CARCINOMA) (H): ICD-10-CM

## 2024-07-02 DIAGNOSIS — C78.6 MALIGNANT NEOPLASM METASTATIC TO PERITONEUM (H): ICD-10-CM

## 2024-07-02 LAB
ALBUMIN SERPL BCG-MCNC: 4.3 G/DL (ref 3.5–5.2)
ALP SERPL-CCNC: 102 U/L (ref 40–150)
ALT SERPL W P-5'-P-CCNC: 19 U/L (ref 0–70)
ANION GAP SERPL CALCULATED.3IONS-SCNC: 12 MMOL/L (ref 7–15)
AST SERPL W P-5'-P-CCNC: 24 U/L (ref 0–45)
BASOPHILS # BLD AUTO: 0 10E3/UL (ref 0–0.2)
BASOPHILS NFR BLD AUTO: 1 %
BILIRUB SERPL-MCNC: 0.5 MG/DL
BUN SERPL-MCNC: 20.4 MG/DL (ref 8–23)
CALCIUM SERPL-MCNC: 8.4 MG/DL (ref 8.8–10.2)
CHLORIDE SERPL-SCNC: 101 MMOL/L (ref 98–107)
CREAT SERPL-MCNC: 1.3 MG/DL (ref 0.67–1.17)
CREAT UR-MCNC: 64.2 MG/DL
DEPRECATED HCO3 PLAS-SCNC: 25 MMOL/L (ref 22–29)
EGFRCR SERPLBLD CKD-EPI 2021: 63 ML/MIN/1.73M2
EOSINOPHIL # BLD AUTO: 0.1 10E3/UL (ref 0–0.7)
EOSINOPHIL NFR BLD AUTO: 2 %
ERYTHROCYTE [DISTWIDTH] IN BLOOD BY AUTOMATED COUNT: 15.3 % (ref 10–15)
GLUCOSE SERPL-MCNC: 107 MG/DL (ref 70–99)
HCT VFR BLD AUTO: 35.4 % (ref 40–53)
HGB BLD-MCNC: 11.3 G/DL (ref 13.3–17.7)
IMM GRANULOCYTES # BLD: 0.1 10E3/UL
IMM GRANULOCYTES NFR BLD: 1 %
LYMPHOCYTES # BLD AUTO: 1 10E3/UL (ref 0.8–5.3)
LYMPHOCYTES NFR BLD AUTO: 17 %
MAGNESIUM SERPL-MCNC: 1.9 MG/DL (ref 1.7–2.3)
MCH RBC QN AUTO: 30.1 PG (ref 26.5–33)
MCHC RBC AUTO-ENTMCNC: 31.9 G/DL (ref 31.5–36.5)
MCV RBC AUTO: 94 FL (ref 78–100)
MICROALBUMIN UR-MCNC: 1733 MG/L
MICROALBUMIN/CREAT UR: 2699.38 MG/G CR (ref 0–17)
MONOCYTES # BLD AUTO: 0.4 10E3/UL (ref 0–1.3)
MONOCYTES NFR BLD AUTO: 7 %
NEUTROPHILS # BLD AUTO: 4.3 10E3/UL (ref 1.6–8.3)
NEUTROPHILS NFR BLD AUTO: 72 %
NRBC # BLD AUTO: 0 10E3/UL
NRBC BLD AUTO-RTO: 0 /100
PHOSPHATE SERPL-MCNC: 2.2 MG/DL (ref 2.5–4.5)
PLATELET # BLD AUTO: 137 10E3/UL (ref 150–450)
POTASSIUM SERPL-SCNC: 4.8 MMOL/L (ref 3.4–5.3)
PROT SERPL-MCNC: 6.9 G/DL (ref 6.4–8.3)
RBC # BLD AUTO: 3.75 10E6/UL (ref 4.4–5.9)
SODIUM SERPL-SCNC: 138 MMOL/L (ref 135–145)
WBC # BLD AUTO: 6 10E3/UL (ref 4–11)

## 2024-07-02 PROCEDURE — 82043 UR ALBUMIN QUANTITATIVE: CPT | Performed by: INTERNAL MEDICINE

## 2024-07-02 PROCEDURE — 36415 COLL VENOUS BLD VENIPUNCTURE: CPT | Performed by: PATHOLOGY

## 2024-07-02 PROCEDURE — 99000 SPECIMEN HANDLING OFFICE-LAB: CPT | Performed by: PATHOLOGY

## 2024-07-02 PROCEDURE — 84100 ASSAY OF PHOSPHORUS: CPT | Performed by: PATHOLOGY

## 2024-07-02 PROCEDURE — 80053 COMPREHEN METABOLIC PANEL: CPT | Performed by: PATHOLOGY

## 2024-07-02 PROCEDURE — 85025 COMPLETE CBC W/AUTO DIFF WBC: CPT | Performed by: PATHOLOGY

## 2024-07-02 PROCEDURE — 83735 ASSAY OF MAGNESIUM: CPT | Performed by: PATHOLOGY

## 2024-07-03 ENCOUNTER — DOCUMENTATION ONLY (OUTPATIENT)
Dept: ONCOLOGY | Facility: CLINIC | Age: 59
End: 2024-07-03
Payer: MEDICARE

## 2024-07-03 NOTE — PROGRESS NOTES
Oral Chemotherapy Monitoring Program  Lab Follow Up    Reviewed lab results from 7/2/24.        4/11/2024    12:00 PM 4/17/2024     3:00 PM 5/7/2024     6:00 PM 5/9/2024     9:00 AM 6/5/2024     8:00 AM 6/11/2024     8:00 AM 7/3/2024     9:00 AM   ORAL CHEMOTHERAPY   Assessment Type Refill Lab Monitoring Lab Monitoring Refill Lab Monitoring Refill Lab Monitoring   Diagnosis Code Hepatocellular Cancer Hepatocellular Cancer Hepatocellular Cancer Hepatocellular Cancer Hepatocellular Cancer Hepatocellular Cancer Hepatocellular Cancer   Providers Dr. Cherelle Villarreal   [facility]   Is this patient followed by the [facility] OC team? No No No No No No No   Drug Name Nexavar (sorafenib) Nexavar (sorafenib) Nexavar (sorafenib) Nexavar (sorafenib) Nexavar (sorafenib) Nexavar (sorafenib) Nexavar (sorafenib)   Dose 200 mg 200 mg 200 mg 200 mg 200 mg 200 mg 200 mg   Current Schedule BID BID BID BID BID BID BID   Cycle Details Continuous Continuous Continuous Continuous Continuous Continuous Continuous   Adverse Effects     No AE identified during assessment     Is the dose as ordered appropriate for the patient?     Yes         Labs:  _  Result Component Current Result Ref Range   Sodium 138 (7/2/2024) 135 - 145 mmol/L     _  Result Component Current Result Ref Range   Potassium 4.8 (7/2/2024) 3.4 - 5.3 mmol/L     _  Result Component Current Result Ref Range   Calcium 8.4 (L) (7/2/2024) 8.8 - 10.2 mg/dL     _  Result Component Current Result Ref Range   Magnesium 1.9 (7/2/2024) 1.7 - 2.3 mg/dL     _  Result Component Current Result Ref Range   Phosphorus 2.2 (L) (7/2/2024) 2.5 - 4.5 mg/dL     _  Result Component Current Result Ref Range   Albumin 4.3 (7/2/2024) 3.5 - 5.2 g/dL     _  Result Component Current Result Ref Range   Urea Nitrogen 20.4 (7/2/2024) 8.0 - 23.0 mg/dL     _  Result Component Current Result Ref  Range   Creatinine 1.30 (H) (7/2/2024) 0.67 - 1.17 mg/dL     _  Result Component Current Result Ref Range   AST 24 (7/2/2024) 0 - 45 U/L     _  Result Component Current Result Ref Range   ALT 19 (7/2/2024) 0 - 70 U/L     _  Result Component Current Result Ref Range   Bilirubin Total 0.5 (7/2/2024) <=1.2 mg/dL     _  Result Component Current Result Ref Range   WBC Count 6.0 (7/2/2024) 4.0 - 11.0 10e3/uL     _  Result Component Current Result Ref Range   Hemoglobin 11.3 (L) (7/2/2024) 13.3 - 17.7 g/dL     _  Result Component Current Result Ref Range   Platelet Count 137 (L) (7/2/2024) 150 - 450 10e3/uL     No results found for ANC within last 30 days.     _  Result Component Current Result Ref Range   Absolute Neutrophils 4.3 (7/2/2024) 1.6 - 8.3 10e3/uL        Assessment & Plan:  Results are notable for phosphorus 2.2. Contacted patient via phone: patient confirmed that he is NOT taking any phos supplements. He used to take it, but his phosphorus was going high and they took him from it.   Will contact provider team to see how they want to proceed.    Follow-Up:  Lab draw 8/2.    Yomaira Conway  Pharmacy Intern  Oral Chemotherapy Monitoring Program  [facility]  [phone]

## 2024-07-04 DIAGNOSIS — C78.6 MALIGNANT NEOPLASM METASTATIC TO PERITONEUM (H): ICD-10-CM

## 2024-07-04 DIAGNOSIS — C22.0 HCC (HEPATOCELLULAR CARCINOMA) (H): Primary | ICD-10-CM

## 2024-07-05 ENCOUNTER — TELEPHONE (OUTPATIENT)
Dept: PHARMACY | Facility: CLINIC | Age: 59
End: 2024-07-05
Payer: MEDICARE

## 2024-07-05 DIAGNOSIS — C78.6 MALIGNANT NEOPLASM METASTATIC TO PERITONEUM (H): ICD-10-CM

## 2024-07-05 DIAGNOSIS — C22.0 HCC (HEPATOCELLULAR CARCINOMA) (H): Primary | ICD-10-CM

## 2024-07-05 DIAGNOSIS — E83.39 HYPOPHOSPHATEMIA: ICD-10-CM

## 2024-07-05 RX ORDER — SODIUM PHOSPHATE, DIBASIC, ANHYDROUS, POTASSIUM PHOSPHATE, MONOBASIC, AND SODIUM PHOSPHATE, MONOBASIC, MONOHYDRATE 852; 155; 130 MG/1; MG/1; MG/1
1 TABLET, COATED ORAL 4 TIMES DAILY
Qty: 120 TABLET | Refills: 1 | Status: SHIPPED | OUTPATIENT
Start: 2024-07-05 | End: 2024-09-11

## 2024-07-05 RX ORDER — SORAFENIB 200 MG/1
200 TABLET, FILM COATED ORAL 2 TIMES DAILY
Qty: 60 TABLET | Refills: 0 | Status: SHIPPED | OUTPATIENT
Start: 2024-07-12 | End: 2024-08-06

## 2024-07-05 NOTE — ORAL ONC MGMT
Dr Villarreal would like Miller to resume phos supplementation, Miller confirms he does not have any remaining on hand and will need a new rx. Rx sent to [facility] by Regan BYRNECC, Miller is ok with rechecking phos level in about a week - IB to scheduling to set this up.    Courtney Panda, PharmD, [facility]  Oral Chemotherapy Monitoring Program  [facility]  [phone]

## 2024-07-09 ENCOUNTER — TELEPHONE (OUTPATIENT)
Dept: ONCOLOGY | Facility: CLINIC | Age: 59
End: 2024-07-09
Payer: MEDICARE

## 2024-07-09 NOTE — TELEPHONE ENCOUNTER
PA Initiation    Medication: PHOSPHA 250 NEUTRAL [phone] MG PO TABS  Insurance Company:    Pharmacy Filling the Rx: [facility] - [place], [place] - [facility] [address]  Filling Pharmacy Phone: [phone]  Filling Pharmacy Fax: [phone]  Start Date: 7/9/2024

## 2024-07-10 NOTE — TELEPHONE ENCOUNTER
PRIOR AUTHORIZATION DENIED    Medication: PHOSPHA 250 NEUTRAL [phone] MG PO TABS  Insurance Company: [employer] - Phone [phone] Fax [phone]  Denial Date: 7/10/2024  Denial Reason(s):   Appeal Information: n/a  Patient Notified: yes

## 2024-07-13 ENCOUNTER — HEALTH MAINTENANCE LETTER (OUTPATIENT)
Age: 59
End: 2024-07-13

## 2024-07-15 ENCOUNTER — LAB (OUTPATIENT)
Dept: LAB | Facility: CLINIC | Age: 59
End: 2024-07-15
Attending: INTERNAL MEDICINE
Payer: MEDICARE

## 2024-07-15 ENCOUNTER — TELEPHONE (OUTPATIENT)
Dept: ONCOLOGY | Facility: CLINIC | Age: 59
End: 2024-07-15

## 2024-07-15 DIAGNOSIS — C22.0 HCC (HEPATOCELLULAR CARCINOMA) (H): ICD-10-CM

## 2024-07-15 DIAGNOSIS — C78.6 MALIGNANT NEOPLASM METASTATIC TO PERITONEUM (H): ICD-10-CM

## 2024-07-15 LAB
ALBUMIN SERPL BCG-MCNC: 4.3 G/DL (ref 3.5–5.2)
ALP SERPL-CCNC: 83 U/L (ref 40–150)
ALT SERPL W P-5'-P-CCNC: 22 U/L (ref 0–70)
ANION GAP SERPL CALCULATED.3IONS-SCNC: 10 MMOL/L (ref 7–15)
AST SERPL W P-5'-P-CCNC: 24 U/L (ref 0–45)
BASOPHILS # BLD AUTO: 0 10E3/UL (ref 0–0.2)
BASOPHILS NFR BLD AUTO: 0 %
BILIRUB SERPL-MCNC: 0.4 MG/DL
BUN SERPL-MCNC: 27.9 MG/DL (ref 8–23)
CALCIUM SERPL-MCNC: 8.2 MG/DL (ref 8.8–10.2)
CHLORIDE SERPL-SCNC: 106 MMOL/L (ref 98–107)
CREAT SERPL-MCNC: 1.31 MG/DL (ref 0.67–1.17)
DEPRECATED HCO3 PLAS-SCNC: 24 MMOL/L (ref 22–29)
EGFRCR SERPLBLD CKD-EPI 2021: 62 ML/MIN/1.73M2
EOSINOPHIL # BLD AUTO: 0.1 10E3/UL (ref 0–0.7)
EOSINOPHIL NFR BLD AUTO: 2 %
ERYTHROCYTE [DISTWIDTH] IN BLOOD BY AUTOMATED COUNT: 15.7 % (ref 10–15)
GLUCOSE SERPL-MCNC: 101 MG/DL (ref 70–99)
HCT VFR BLD AUTO: 34.1 % (ref 40–53)
HGB BLD-MCNC: 10.8 G/DL (ref 13.3–17.7)
IMM GRANULOCYTES # BLD: 0.1 10E3/UL
IMM GRANULOCYTES NFR BLD: 1 %
LYMPHOCYTES # BLD AUTO: 1 10E3/UL (ref 0.8–5.3)
LYMPHOCYTES NFR BLD AUTO: 16 %
MAGNESIUM SERPL-MCNC: 1.6 MG/DL (ref 1.7–2.3)
MCH RBC QN AUTO: 30.2 PG (ref 26.5–33)
MCHC RBC AUTO-ENTMCNC: 31.7 G/DL (ref 31.5–36.5)
MCV RBC AUTO: 95 FL (ref 78–100)
MONOCYTES # BLD AUTO: 0.4 10E3/UL (ref 0–1.3)
MONOCYTES NFR BLD AUTO: 7 %
NEUTROPHILS # BLD AUTO: 4.7 10E3/UL (ref 1.6–8.3)
NEUTROPHILS NFR BLD AUTO: 74 %
NRBC # BLD AUTO: 0 10E3/UL
NRBC BLD AUTO-RTO: 0 /100
PHOSPHATE SERPL-MCNC: 3 MG/DL (ref 2.5–4.5)
PLATELET # BLD AUTO: 141 10E3/UL (ref 150–450)
POTASSIUM SERPL-SCNC: 4.9 MMOL/L (ref 3.4–5.3)
PROT SERPL-MCNC: 6.8 G/DL (ref 6.4–8.3)
RBC # BLD AUTO: 3.58 10E6/UL (ref 4.4–5.9)
SODIUM SERPL-SCNC: 140 MMOL/L (ref 135–145)
WBC # BLD AUTO: 6.3 10E3/UL (ref 4–11)

## 2024-07-15 PROCEDURE — 83735 ASSAY OF MAGNESIUM: CPT | Performed by: PATHOLOGY

## 2024-07-15 PROCEDURE — 36415 COLL VENOUS BLD VENIPUNCTURE: CPT | Performed by: PATHOLOGY

## 2024-07-15 PROCEDURE — 84100 ASSAY OF PHOSPHORUS: CPT | Performed by: PATHOLOGY

## 2024-07-15 PROCEDURE — 85025 COMPLETE CBC W/AUTO DIFF WBC: CPT | Performed by: PATHOLOGY

## 2024-07-15 PROCEDURE — 80053 COMPREHEN METABOLIC PANEL: CPT | Performed by: PATHOLOGY

## 2024-07-15 NOTE — TELEPHONE ENCOUNTER
Oral Chemotherapy Monitoring Program  Lab Follow Up    Reviewed lab results from 7/15.        5/7/2024     6:00 PM 5/9/2024     9:00 AM 6/5/2024     8:00 AM 6/11/2024     8:00 AM 7/3/2024     9:00 AM 7/5/2024     9:00 AM 7/15/2024     1:00 PM   ORAL CHEMOTHERAPY   Assessment Type Lab Monitoring Refill Lab Monitoring Refill Lab Monitoring Other;Refill Lab Monitoring   Diagnosis Code Hepatocellular Cancer Hepatocellular Cancer Hepatocellular Cancer Hepatocellular Cancer Hepatocellular Cancer Hepatocellular Cancer Hepatocellular Cancer   Providers Dr. Cherelle Villarreal   [facility]   Is this patient followed by the [facility] OC team? No No No No No No No   Drug Name Nexavar (sorafenib) Nexavar (sorafenib) Nexavar (sorafenib) Nexavar (sorafenib) Nexavar (sorafenib) Nexavar (sorafenib) Nexavar (sorafenib)   Dose 200 mg 200 mg 200 mg 200 mg 200 mg 200 mg 200 mg   Current Schedule BID BID BID BID BID BID BID   Cycle Details Continuous Continuous Continuous Continuous Continuous Continuous Continuous   Adverse Effects   No AE identified during assessment       Is the dose as ordered appropriate for the patient?   Yes           Labs:  _  Result Component Current Result Ref Range   Sodium 140 (7/15/2024) 135 - 145 mmol/L     _  Result Component Current Result Ref Range   Potassium 4.9 (7/15/2024) 3.4 - 5.3 mmol/L     _  Result Component Current Result Ref Range   Calcium 8.2 (L) (7/15/2024) 8.8 - 10.2 mg/dL     _  Result Component Current Result Ref Range   Magnesium 1.6 (L) (7/15/2024) 1.7 - 2.3 mg/dL     _  Result Component Current Result Ref Range   Phosphorus 3.0 (7/15/2024) 2.5 - 4.5 mg/dL     _  Result Component Current Result Ref Range   Albumin 4.3 (7/15/2024) 3.5 - 5.2 g/dL     _  Result Component Current Result Ref Range   Urea Nitrogen 27.9 (H) (7/15/2024) 8.0 - 23.0 mg/dL     _  Result Component  Current Result Ref Range   Creatinine 1.31 (H) (7/15/2024) 0.67 - 1.17 mg/dL     _  Result Component Current Result Ref Range   AST 24 (7/15/2024) 0 - 45 U/L     _  Result Component Current Result Ref Range   ALT 22 (7/15/2024) 0 - 70 U/L     _  Result Component Current Result Ref Range   Bilirubin Total 0.4 (7/15/2024) <=1.2 mg/dL     _  Result Component Current Result Ref Range   WBC Count 6.3 (7/15/2024) 4.0 - 11.0 10e3/uL     _  Result Component Current Result Ref Range   Hemoglobin 10.8 (L) (7/15/2024) 13.3 - 17.7 g/dL     _  Result Component Current Result Ref Range   Platelet Count 141 (L) (7/15/2024) 150 - 450 10e3/uL     No results found for ANC within last 30 days.     _  Result Component Current Result Ref Range   Absolute Neutrophils 4.7 (7/15/2024) 1.6 - 8.3 10e3/uL        Assessment & Plan:  Phosphorous has improved to 3.0. His magnesium is just slightly below lower limit of normal. He reports that he had diarrhea last week, but this has resolved. No changes to treatment needed at this time.    Questions answered to patient's satisfaction.    Follow-Up:  8/2 labs    Anne-Marie Padilla, Aubrey, [facility]  Oral Chemotherapy Monitoring Program  [facility]  [phone]

## 2024-07-16 DIAGNOSIS — I82.452 ACUTE DEEP VEIN THROMBOSIS (DVT) OF LEFT PERONEAL VEIN (H): ICD-10-CM

## 2024-07-16 NOTE — TELEPHONE ENCOUNTER
Eliquis Refill   Last prescribing provider: Dr Villarreal     Last clinic visit date: 5/9/24 Dr Villarreal     Recommendations for requested medication (if none, N/A): N/A    Any other pertinent information (if none, N/A): N/A    Refilled: Y/N, if NO, why?

## 2024-07-17 RX ORDER — APIXABAN 5 MG/1
5 TABLET, FILM COATED ORAL 2 TIMES DAILY
Qty: 60 TABLET | Refills: 3 | Status: SHIPPED | OUTPATIENT
Start: 2024-07-17

## 2024-07-19 ENCOUNTER — TELEPHONE (OUTPATIENT)
Dept: DERMATOLOGY | Facility: CLINIC | Age: 59
End: 2024-07-19
Payer: MEDICARE

## 2024-07-19 DIAGNOSIS — E11.3293 TYPE 2 DIABETES MELLITUS WITH MILD NONPROLIFERATIVE RETINOPATHY OF BOTH EYES WITHOUT MACULAR EDEMA, UNSPECIFIED WHETHER LONG TERM INSULIN USE (H): Primary | ICD-10-CM

## 2024-07-19 NOTE — TELEPHONE ENCOUNTER
Left Voicemail (1st Attempt) and Sent Mychart (1st Attempt) for the patient to call back and schedule the following:    Appointment type: Adjust appt time   Provider: Dr. Cespedes  Return date: 11/12/24  Specialty phone number: [phone]  Additional appointment(s) needed:   Additonal Notes:     Please assist with changing pt's appt from AM to the afternoon, same day if avail

## 2024-07-23 ENCOUNTER — TELEPHONE (OUTPATIENT)
Dept: DERMATOLOGY | Facility: CLINIC | Age: 59
End: 2024-07-23
Payer: MEDICARE

## 2024-07-23 NOTE — TELEPHONE ENCOUNTER
Left Voicemail (1st Attempt) and Sent Mychart (1st Attempt) for the patient to call back and schedule the following:    Appointment type: FOLLOW UP    Provider: Coy    Return date: 11/12/2024     Specialty phone number: [phone]    Additonal Notes: please reschedule to the afternoon of the same day or on the later date.

## 2024-07-24 RX ORDER — ALCOHOL ANTISEPTIC PADS
1 PADS, MEDICATED (EA) TOPICAL 4 TIMES DAILY
Qty: 400 EACH | Refills: 1 | Status: SHIPPED | OUTPATIENT
Start: 2024-07-24

## 2024-07-29 DIAGNOSIS — C78.6 MALIGNANT NEOPLASM METASTATIC TO PERITONEUM (H): ICD-10-CM

## 2024-07-29 DIAGNOSIS — C22.0 HCC (HEPATOCELLULAR CARCINOMA) (H): Primary | ICD-10-CM

## 2024-08-02 ENCOUNTER — LAB (OUTPATIENT)
Dept: LAB | Facility: CLINIC | Age: 59
End: 2024-08-02
Payer: MEDICARE

## 2024-08-02 DIAGNOSIS — E78.5 HYPERLIPIDEMIA LDL GOAL <100: ICD-10-CM

## 2024-08-02 DIAGNOSIS — I25.10 CORONARY ARTERY DISEASE INVOLVING NATIVE CORONARY ARTERY OF NATIVE HEART WITHOUT ANGINA PECTORIS: ICD-10-CM

## 2024-08-02 LAB
CHOLEST SERPL-MCNC: 139 MG/DL
FASTING STATUS PATIENT QL REPORTED: YES
HDLC SERPL-MCNC: 37 MG/DL
LDLC SERPL CALC-MCNC: 53 MG/DL
NONHDLC SERPL-MCNC: 102 MG/DL
TRIGL SERPL-MCNC: 245 MG/DL

## 2024-08-02 PROCEDURE — 80061 LIPID PANEL: CPT | Performed by: PATHOLOGY

## 2024-08-02 PROCEDURE — 36415 COLL VENOUS BLD VENIPUNCTURE: CPT | Performed by: PATHOLOGY

## 2024-08-05 ENCOUNTER — LAB (OUTPATIENT)
Dept: LAB | Facility: CLINIC | Age: 59
End: 2024-08-05
Payer: MEDICARE

## 2024-08-05 ENCOUNTER — ANCILLARY PROCEDURE (OUTPATIENT)
Dept: CT IMAGING | Facility: CLINIC | Age: 59
End: 2024-08-05
Attending: INTERNAL MEDICINE
Payer: MEDICARE

## 2024-08-05 DIAGNOSIS — N18.32 ANEMIA OF CHRONIC RENAL FAILURE, STAGE 3B (H): ICD-10-CM

## 2024-08-05 DIAGNOSIS — E11.3293 TYPE 2 DIABETES MELLITUS WITH MILD NONPROLIFERATIVE RETINOPATHY OF BOTH EYES WITHOUT MACULAR EDEMA, UNSPECIFIED WHETHER LONG TERM INSULIN USE (H): ICD-10-CM

## 2024-08-05 DIAGNOSIS — N18.32 STAGE 3B CHRONIC KIDNEY DISEASE (H): ICD-10-CM

## 2024-08-05 DIAGNOSIS — C78.6 MALIGNANT NEOPLASM METASTATIC TO PERITONEUM (H): ICD-10-CM

## 2024-08-05 DIAGNOSIS — C22.0 HCC (HEPATOCELLULAR CARCINOMA) (H): ICD-10-CM

## 2024-08-05 DIAGNOSIS — D63.1 ANEMIA OF CHRONIC RENAL FAILURE, STAGE 3B (H): ICD-10-CM

## 2024-08-05 DIAGNOSIS — Z94.4 LIVER TRANSPLANT RECIPIENT (H): ICD-10-CM

## 2024-08-05 LAB
AFP SERPL-MCNC: 10.2 NG/ML
ALBUMIN SERPL BCG-MCNC: 4.3 G/DL (ref 3.5–5.2)
ALP SERPL-CCNC: 99 U/L (ref 40–150)
ALT SERPL W P-5'-P-CCNC: 31 U/L (ref 0–70)
ANION GAP SERPL CALCULATED.3IONS-SCNC: 12 MMOL/L (ref 7–15)
AST SERPL W P-5'-P-CCNC: 34 U/L (ref 0–45)
BASOPHILS # BLD AUTO: 0 10E3/UL (ref 0–0.2)
BASOPHILS NFR BLD AUTO: 1 %
BILIRUB SERPL-MCNC: 0.5 MG/DL
BUN SERPL-MCNC: 24.3 MG/DL (ref 8–23)
CALCIUM SERPL-MCNC: 7.6 MG/DL (ref 8.8–10.4)
CHLORIDE SERPL-SCNC: 102 MMOL/L (ref 98–107)
CREAT SERPL-MCNC: 1.26 MG/DL (ref 0.67–1.17)
EGFRCR SERPLBLD CKD-EPI 2021: 65 ML/MIN/1.73M2
EOSINOPHIL # BLD AUTO: 0.1 10E3/UL (ref 0–0.7)
EOSINOPHIL NFR BLD AUTO: 3 %
ERYTHROCYTE [DISTWIDTH] IN BLOOD BY AUTOMATED COUNT: 15.8 % (ref 10–15)
GLUCOSE SERPL-MCNC: 172 MG/DL (ref 70–99)
HCO3 SERPL-SCNC: 23 MMOL/L (ref 22–29)
HCT VFR BLD AUTO: 31.8 % (ref 40–53)
HGB BLD-MCNC: 10.2 G/DL (ref 13.3–17.7)
IMM GRANULOCYTES # BLD: 0.1 10E3/UL
IMM GRANULOCYTES NFR BLD: 2 %
LYMPHOCYTES # BLD AUTO: 0.8 10E3/UL (ref 0.8–5.3)
LYMPHOCYTES NFR BLD AUTO: 18 %
MAGNESIUM SERPL-MCNC: 1.5 MG/DL (ref 1.7–2.3)
MCH RBC QN AUTO: 30.4 PG (ref 26.5–33)
MCHC RBC AUTO-ENTMCNC: 32.1 G/DL (ref 31.5–36.5)
MCV RBC AUTO: 95 FL (ref 78–100)
MONOCYTES # BLD AUTO: 0.4 10E3/UL (ref 0–1.3)
MONOCYTES NFR BLD AUTO: 8 %
NEUTROPHILS # BLD AUTO: 3 10E3/UL (ref 1.6–8.3)
NEUTROPHILS NFR BLD AUTO: 68 %
NRBC # BLD AUTO: 0 10E3/UL
NRBC BLD AUTO-RTO: 0 /100
PHOSPHATE SERPL-MCNC: 3.4 MG/DL (ref 2.5–4.5)
PLATELET # BLD AUTO: 109 10E3/UL (ref 150–450)
POTASSIUM SERPL-SCNC: 4.3 MMOL/L (ref 3.4–5.3)
PROT SERPL-MCNC: 6.8 G/DL (ref 6.4–8.3)
RBC # BLD AUTO: 3.35 10E6/UL (ref 4.4–5.9)
SODIUM SERPL-SCNC: 137 MMOL/L (ref 135–145)
WBC # BLD AUTO: 4.4 10E3/UL (ref 4–11)

## 2024-08-05 PROCEDURE — 84100 ASSAY OF PHOSPHORUS: CPT | Performed by: PATHOLOGY

## 2024-08-05 PROCEDURE — 99000 SPECIMEN HANDLING OFFICE-LAB: CPT | Performed by: PATHOLOGY

## 2024-08-05 PROCEDURE — 82105 ALPHA-FETOPROTEIN SERUM: CPT | Performed by: INTERNAL MEDICINE

## 2024-08-05 PROCEDURE — 83735 ASSAY OF MAGNESIUM: CPT | Performed by: PATHOLOGY

## 2024-08-05 PROCEDURE — 36415 COLL VENOUS BLD VENIPUNCTURE: CPT | Performed by: PATHOLOGY

## 2024-08-05 PROCEDURE — 80053 COMPREHEN METABOLIC PANEL: CPT | Performed by: PATHOLOGY

## 2024-08-05 PROCEDURE — 85025 COMPLETE CBC W/AUTO DIFF WBC: CPT | Performed by: PATHOLOGY

## 2024-08-05 PROCEDURE — G1010 CDSM STANSON: HCPCS | Mod: GC | Performed by: RADIOLOGY

## 2024-08-05 PROCEDURE — 74177 CT ABD & PELVIS W/CONTRAST: CPT | Mod: MG | Performed by: RADIOLOGY

## 2024-08-05 PROCEDURE — 71260 CT THORAX DX C+: CPT | Mod: MG | Performed by: RADIOLOGY

## 2024-08-05 RX ORDER — IOPAMIDOL 755 MG/ML
87 INJECTION, SOLUTION INTRAVASCULAR ONCE
Status: COMPLETED | OUTPATIENT
Start: 2024-08-05 | End: 2024-08-05

## 2024-08-05 RX ADMIN — IOPAMIDOL 87 ML: 755 INJECTION, SOLUTION INTRAVASCULAR at 09:04

## 2024-08-05 NOTE — DISCHARGE INSTRUCTIONS

## 2024-08-06 ENCOUNTER — TELEPHONE (OUTPATIENT)
Dept: ENDOCRINOLOGY | Facility: CLINIC | Age: 59
End: 2024-08-06
Payer: MEDICARE

## 2024-08-06 DIAGNOSIS — Z94.4 LIVER REPLACED BY TRANSPLANT (H): ICD-10-CM

## 2024-08-06 DIAGNOSIS — I82.452 ACUTE DEEP VEIN THROMBOSIS (DVT) OF LEFT PERONEAL VEIN (H): ICD-10-CM

## 2024-08-06 DIAGNOSIS — C22.0 HCC (HEPATOCELLULAR CARCINOMA) (H): Primary | ICD-10-CM

## 2024-08-06 DIAGNOSIS — C78.6 MALIGNANT NEOPLASM METASTATIC TO PERITONEUM (H): ICD-10-CM

## 2024-08-06 DIAGNOSIS — E11.3293 TYPE 2 DIABETES MELLITUS WITH MILD NONPROLIFERATIVE RETINOPATHY OF BOTH EYES WITHOUT MACULAR EDEMA, UNSPECIFIED WHETHER LONG TERM INSULIN USE (H): Primary | ICD-10-CM

## 2024-08-06 RX ORDER — LAMIVUDINE 100 MG/1
100 TABLET, FILM COATED ORAL DAILY
Qty: 90 TABLET | Refills: 2 | Status: SHIPPED | OUTPATIENT
Start: 2024-08-06

## 2024-08-06 RX ORDER — SORAFENIB 200 MG/1
200 TABLET, FILM COATED ORAL 2 TIMES DAILY
Qty: 60 TABLET | Refills: 0 | Status: SHIPPED | OUTPATIENT
Start: 2024-08-11 | End: 2024-10-07

## 2024-08-06 NOTE — TELEPHONE ENCOUNTER
[facility]  Fax:[phone]  Spec: [phone]  MO: [phone]

## 2024-08-06 NOTE — TELEPHONE ENCOUNTER
Loperamide 2mg capsule  Last prescribing provider: Dr. Villarreal    Last clinic visit date: 5/9/24    Recommendations for requested medication (if none, N/A): NA    Any other pertinent information (if none, N/A): NA    Refilled: Y/N, if NO, why?

## 2024-08-07 RX ORDER — LOPERAMIDE HCL 2 MG
CAPSULE ORAL
Qty: 120 CAPSULE | Refills: 1 | Status: SHIPPED | OUTPATIENT
Start: 2024-08-07 | End: 2024-08-08

## 2024-08-08 ENCOUNTER — VIRTUAL VISIT (OUTPATIENT)
Dept: ONCOLOGY | Facility: CLINIC | Age: 59
End: 2024-08-08
Attending: INTERNAL MEDICINE
Payer: MEDICARE

## 2024-08-08 VITALS — BODY MASS INDEX: 23.7 KG/M2 | HEIGHT: 69 IN | WEIGHT: 160 LBS

## 2024-08-08 DIAGNOSIS — N18.32 STAGE 3B CHRONIC KIDNEY DISEASE (H): ICD-10-CM

## 2024-08-08 DIAGNOSIS — C78.6 MALIGNANT NEOPLASM METASTATIC TO PERITONEUM (H): ICD-10-CM

## 2024-08-08 DIAGNOSIS — C22.0 HCC (HEPATOCELLULAR CARCINOMA) (H): ICD-10-CM

## 2024-08-08 DIAGNOSIS — Z94.4 LIVER TRANSPLANT RECIPIENT (H): ICD-10-CM

## 2024-08-08 DIAGNOSIS — E11.3293 TYPE 2 DIABETES MELLITUS WITH MILD NONPROLIFERATIVE RETINOPATHY OF BOTH EYES WITHOUT MACULAR EDEMA, UNSPECIFIED WHETHER LONG TERM INSULIN USE (H): ICD-10-CM

## 2024-08-08 DIAGNOSIS — I82.452 ACUTE DEEP VEIN THROMBOSIS (DVT) OF LEFT PERONEAL VEIN (H): ICD-10-CM

## 2024-08-08 DIAGNOSIS — N18.32 ANEMIA OF CHRONIC RENAL FAILURE, STAGE 3B (H): ICD-10-CM

## 2024-08-08 DIAGNOSIS — D63.1 ANEMIA OF CHRONIC RENAL FAILURE, STAGE 3B (H): ICD-10-CM

## 2024-08-08 DIAGNOSIS — R19.7 DIARRHEA, UNSPECIFIED TYPE: Primary | ICD-10-CM

## 2024-08-08 PROCEDURE — 99215 OFFICE O/P EST HI 40 MIN: CPT | Mod: 95 | Performed by: INTERNAL MEDICINE

## 2024-08-08 RX ORDER — METHYLPREDNISOLONE SODIUM SUCCINATE 125 MG/2ML
125 INJECTION, POWDER, LYOPHILIZED, FOR SOLUTION INTRAMUSCULAR; INTRAVENOUS
Status: CANCELLED
Start: 2024-08-09

## 2024-08-08 RX ORDER — MEPERIDINE HYDROCHLORIDE 25 MG/ML
25 INJECTION INTRAMUSCULAR; INTRAVENOUS; SUBCUTANEOUS EVERY 30 MIN PRN
Status: CANCELLED | OUTPATIENT
Start: 2024-08-09

## 2024-08-08 RX ORDER — HEPARIN SODIUM,PORCINE 10 UNIT/ML
5-20 VIAL (ML) INTRAVENOUS DAILY PRN
Status: CANCELLED | OUTPATIENT
Start: 2024-08-09

## 2024-08-08 RX ORDER — LOPERAMIDE HCL 2 MG
2 CAPSULE ORAL 4 TIMES DAILY PRN
Qty: 120 CAPSULE | Refills: 3 | Status: SHIPPED | OUTPATIENT
Start: 2024-08-08 | End: 2024-08-30

## 2024-08-08 RX ORDER — EPINEPHRINE 1 MG/ML
0.3 INJECTION, SOLUTION INTRAMUSCULAR; SUBCUTANEOUS EVERY 5 MIN PRN
Status: CANCELLED | OUTPATIENT
Start: 2024-08-09

## 2024-08-08 RX ORDER — ALBUTEROL SULFATE 0.83 MG/ML
2.5 SOLUTION RESPIRATORY (INHALATION)
Status: CANCELLED | OUTPATIENT
Start: 2024-08-09

## 2024-08-08 RX ORDER — ALBUTEROL SULFATE 90 UG/1
1-2 AEROSOL, METERED RESPIRATORY (INHALATION)
Status: CANCELLED
Start: 2024-08-09

## 2024-08-08 RX ORDER — HEPARIN SODIUM (PORCINE) LOCK FLUSH IV SOLN 100 UNIT/ML 100 UNIT/ML
5 SOLUTION INTRAVENOUS
Status: CANCELLED | OUTPATIENT
Start: 2024-08-09

## 2024-08-08 RX ORDER — DIPHENHYDRAMINE HYDROCHLORIDE 50 MG/ML
50 INJECTION INTRAMUSCULAR; INTRAVENOUS
Status: CANCELLED
Start: 2024-08-09

## 2024-08-08 ASSESSMENT — PAIN SCALES - GENERAL: PAINLEVEL: SEVERE PAIN (7)

## 2024-08-08 NOTE — TELEPHONE ENCOUNTER
Message  Received: Yesterday  Frannie Vasquez PA-C Miller, Deanne M RN  Caller: Unspecified (6 days ago,  3:44 PM)  Yes, thanks!  Frannie          Previous Messages    Prior Auth - Medication (Novolog Flexpen-DENIED)  (Newest Message First)  View All Conversations on this Encounter  Frannie Vasquez PA-C  You10 hours ago (9:38 PM)       Yes, thanks!  Frannie     You  Frannie Vasquez PA-C3 days ago     DM  Fiasp ok?     Yamilet Gan  [facility]4 days ago     DS  PA has been denied, please see rational.  If done with encounter, please notify the patient and close the encounter.    Thank you,  Retail PA Team             RE    PRIOR AUTHORIZATION DENIED    Medication: NOVOLOG FLEXPEN 100 UNIT/ML SC SOPN  Insurance Company: [employer] - Phone [phone] Fax [phone]  Denial Date: 8/8/2024  Denial Rational:         Appeal Information:   If provider would like to appeal please review the plan's reasons for denial listed above. Please utilize that information to complete letter and provide specific, detailed clinical information/rationale of your patient's health status to address their denial reasons.        Patient Notified: No

## 2024-08-08 NOTE — LETTER
8/8/2024      Frandy Workman  [address] [facility] [address]      Dear Colleague,    Thank you for referring your patient, Frandy Workman, to the [facility]. Please see a copy of my visit note below.    Virtual Visit Details    Type of service:  Video Visit   Video Start Time:  900  Video End Time: 930  Originating Location (pt. Location): Home  Distant Location (provider location):  Off-site  Platform used for Video Visit: Odette      I am seeing Miller Dejesus today in follow-up of metastatic hepatocellular carcinoma in the setting of a prior liver transplant.    He is 59 years old and was originally diagnosed more than 5 years ago with 2 lesions in his cirrhotic liver which were treated with TACE followed by liver transplant in 2019.  In June 2023 he developed peritoneal metastases and started therapy with sorafenib.  He did not tolerate 400 mg twice per day due to severe diarrhea and nausea and since has been on 200 mg twice per day with good control of her symptoms.  He returns today for his next planned response assessment.    He tells me his overall condition is generally been about the same but he has noted some new problems in the last week.  For the last 3 to 4 days he has had significant watery diarrhea.  He tells me that this has not been controlled with his Lomotil, though he has minimized his dosing because his prescription was running low.  He does not feel particularly dizzy or lightheaded at this point but notes his weight today is 7 pounds below where he was a week ago.  He continues to be very fatigued over the last several months and that if anything is perhaps a little bit worse.  He is trying to exercise but his tolerance is very limited by the fatigue.  He noted a few days ago a very large bruise in his chest and is unaware of any recent falls or other trauma.  He has not noted any other bleeding.    On exam he appears older than his stated age and in no  acute distress.  Overlying his right breast is a approximately 12 cm ecchymosis.  I have a she cannot feel it over the video link, but he tells me it feels as if there is a hard lump there.    I personally reviewed his CT scan and went over the results with him.  In his right upper quadrant a tiny peritoneal nodule has increased in size going from 5 mm to 10 mm.  I do not see any other clear evidence of disease progression.  He has a stable amount of gynecomastia and I do not see any sort of hematoma or other mass in his right breast, but he thinks the bruise did not appear until after he had the CT scan earlier this week.    Labs done 3 days ago show normal electrolytes with stable chronic renal failure with a creatinine of 1.26.  His phosphorus is normalized but now his magnesium is little bit low at 1.5.  His bilirubin, albumin liver enzymes are normal.  His platelets are slightly worse than previous at 109, his hemoglobin is stable at 10.2 and his white count is normal.  His alpha-fetoprotein is gone up a little bit at 10.    Assessment/plan:  1.  Metastatic hepatocellular carcinoma.  He has minor evidence of disease progression on his current sorafenib of 200 mg twice daily.  The changes are small enough to not be certain but I am highly suspicious that in fact he is progressing.  I would like to repeat his imaging and lab work in about 6 weeks to confirm if he truly is progressing or not.  If he is progressing I do not think he will tolerate trying to push up his dose on sorafenib and we did have to consider switching to a different tyrosine kinase inhibitor.  2.  Diarrhea, chronic with acute worsening.  The chronic component of this is at least in part due to his therapy, but I could elicit no clear reason for the acute worsening.  I refilled his loperamide for him and ask him to pick that up today and start taking the full doses again.  I think he is probably gotten significantly volume depleted and I am  worried about its effects on his renal function.  I would like to recheck his lab work since this all seems to have started since the last set of labs we have.  We also will plan on collecting stool for enteric pathogens.  We will plan on him coming in for a liter of fluids today or tomorrow.  3.  Chronic renal failure.  As above we will be rechecking his labs and giving him some extra fluids.  4.  Large spontaneous ecchymosis/hematoma in his right breast.  He is on Eliquis for his prior DVT. He has had a stable dose of the Eliquis for quite some time I do not see any other obvious change in his status to explain this. I would like to check his coags to be sure he has not not developed some other coagulopathy that is contributing to this.  If we do not find any other explicit explanation we will probably need to decrease his dose if he has further spontaneous ecchymoses.      Again, thank you for allowing me to participate in the care of your patient.        Sincerely,        Miguel Villarreal MD

## 2024-08-08 NOTE — NURSING NOTE
Current patient location: [address] [facility] [address]    Is the patient currently in the state of [place]? YES    Visit mode:VIDEO    If the visit is dropped, the patient can be reconnected by: VIDEO VISIT: Send to e-mail at: [email or web address]    Will anyone else be joining the visit? NO  (If patient encounters technical issues they should call [phone]    How would you like to obtain your AVS? MyChart    Are changes needed to the allergy or medication list? Pt stated no changes to allergies and Pt stated no med changes    Are refills needed on medications prescribed by this physician? NO    Rooming Documentation:  Unable to complete questionnaire(s) due to time      Reason for visit: KHUSHBU Baird LPN

## 2024-08-08 NOTE — LETTER
8/8/2024         RE: Frandy Workman  [address] [facility] [address]      Virtual Visit Details    Type of service:  Video Visit   Video Start Time:  900  Video End Time: 930  Originating Location (pt. Location): Home  Distant Location (provider location):  Off-site  Platform used for Video Visit: Odette      I am seeing Miller Dejesus today in follow-up of metastatic hepatocellular carcinoma in the setting of a prior liver transplant.    He is 59 years old and was originally diagnosed more than 5 years ago with 2 lesions in his cirrhotic liver which were treated with TACE followed by liver transplant in 2019.  In June 2023 he developed peritoneal metastases and started therapy with sorafenib.  He did not tolerate 400 mg twice per day due to severe diarrhea and nausea and since has been on 200 mg twice per day with good control of her symptoms.  He returns today for his next planned response assessment.    He tells me his overall condition is generally been about the same but he has noted some new problems in the last week.  For the last 3 to 4 days he has had significant watery diarrhea.  He tells me that this has not been controlled with his Lomotil, though he has minimized his dosing because his prescription was running low.  He does not feel particularly dizzy or lightheaded at this point but notes his weight today is 7 pounds below where he was a week ago.  He continues to be very fatigued over the last several months and that if anything is perhaps a little bit worse.  He is trying to exercise but his tolerance is very limited by the fatigue.  He noted a few days ago a very large bruise in his chest and is unaware of any recent falls or other trauma.  He has not noted any other bleeding.    On exam he appears older than his stated age and in no acute distress.  Overlying his right breast is a approximately 12 cm ecchymosis.  I have a she cannot feel it over the video link, but he tells me it feels as  if there is a hard lump there.    I personally reviewed his CT scan and went over the results with him.  In his right upper quadrant a tiny peritoneal nodule has increased in size going from 5 mm to 10 mm.  I do not see any other clear evidence of disease progression.  He has a stable amount of gynecomastia and I do not see any sort of hematoma or other mass in his right breast, but he thinks the bruise did not appear until after he had the CT scan earlier this week.    Labs done 3 days ago show normal electrolytes with stable chronic renal failure with a creatinine of 1.26.  His phosphorus is normalized but now his magnesium is little bit low at 1.5.  His bilirubin, albumin liver enzymes are normal.  His platelets are slightly worse than previous at 109, his hemoglobin is stable at 10.2 and his white count is normal.  His alpha-fetoprotein is gone up a little bit at 10.    Assessment/plan:  1.  Metastatic hepatocellular carcinoma.  He has minor evidence of disease progression on his current sorafenib of 200 mg twice daily.  The changes are small enough to not be certain but I am highly suspicious that in fact he is progressing.  I would like to repeat his imaging and lab work in about 6 weeks to confirm if he truly is progressing or not.  If he is progressing I do not think he will tolerate trying to push up his dose on sorafenib and we did have to consider switching to a different tyrosine kinase inhibitor.  2.  Diarrhea, chronic with acute worsening.  The chronic component of this is at least in part due to his therapy, but I could elicit no clear reason for the acute worsening.  I refilled his loperamide for him and ask him to pick that up today and start taking the full doses again.  I think he is probably gotten significantly volume depleted and I am worried about its effects on his renal function.  I would like to recheck his lab work since this all seems to have started since the last set of labs we have.   We also will plan on collecting stool for enteric pathogens.  We will plan on him coming in for a liter of fluids today or tomorrow.  3.  Chronic renal failure.  As above we will be rechecking his labs and giving him some extra fluids.  4.  Large spontaneous ecchymosis/hematoma in his right breast.  He is on Eliquis for his prior DVT. He has had a stable dose of the Eliquis for quite some time I do not see any other obvious change in his status to explain this. I would like to check his coags to be sure he has not not developed some other coagulopathy that is contributing to this.  If we do not find any other explicit explanation we will probably need to decrease his dose if he has further spontaneous ecchymoses.        Miguel Villarreal MD

## 2024-08-08 NOTE — PROGRESS NOTES
Virtual Visit Details    Type of service:  Video Visit   Video Start Time:  900  Video End Time: 930  Originating Location (pt. Location): Home  Distant Location (provider location):  Off-site  Platform used for Video Visit: Odette      I am seeing Miller Dejesus today in follow-up of metastatic hepatocellular carcinoma in the setting of a prior liver transplant.    He is 59 years old and was originally diagnosed more than 5 years ago with 2 lesions in his cirrhotic liver which were treated with TACE followed by liver transplant in 2019.  In June 2023 he developed peritoneal metastases and started therapy with sorafenib.  He did not tolerate 400 mg twice per day due to severe diarrhea and nausea and since has been on 200 mg twice per day with good control of her symptoms.  He returns today for his next planned response assessment.    He tells me his overall condition is generally been about the same but he has noted some new problems in the last week.  For the last 3 to 4 days he has had significant watery diarrhea.  He tells me that this has not been controlled with his Lomotil, though he has minimized his dosing because his prescription was running low.  He does not feel particularly dizzy or lightheaded at this point but notes his weight today is 7 pounds below where he was a week ago.  He continues to be very fatigued over the last several months and that if anything is perhaps a little bit worse.  He is trying to exercise but his tolerance is very limited by the fatigue.  He noted a few days ago a very large bruise in his chest and is unaware of any recent falls or other trauma.  He has not noted any other bleeding.    On exam he appears older than his stated age and in no acute distress.  Overlying his right breast is a approximately 12 cm ecchymosis.  I have a she cannot feel it over the video link, but he tells me it feels as if there is a hard lump there.    I personally reviewed his CT scan and went over the  results with him.  In his right upper quadrant a tiny peritoneal nodule has increased in size going from 5 mm to 10 mm.  I do not see any other clear evidence of disease progression.  He has a stable amount of gynecomastia and I do not see any sort of hematoma or other mass in his right breast, but he thinks the bruise did not appear until after he had the CT scan earlier this week.    Labs done 3 days ago show normal electrolytes with stable chronic renal failure with a creatinine of 1.26.  His phosphorus is normalized but now his magnesium is little bit low at 1.5.  His bilirubin, albumin liver enzymes are normal.  His platelets are slightly worse than previous at 109, his hemoglobin is stable at 10.2 and his white count is normal.  His alpha-fetoprotein is gone up a little bit at 10.    Assessment/plan:  1.  Metastatic hepatocellular carcinoma.  He has minor evidence of disease progression on his current sorafenib of 200 mg twice daily.  The changes are small enough to not be certain but I am highly suspicious that in fact he is progressing.  I would like to repeat his imaging and lab work in about 6 weeks to confirm if he truly is progressing or not.  If he is progressing I do not think he will tolerate trying to push up his dose on sorafenib and we did have to consider switching to a different tyrosine kinase inhibitor.  2.  Diarrhea, chronic with acute worsening.  The chronic component of this is at least in part due to his therapy, but I could elicit no clear reason for the acute worsening.  I refilled his loperamide for him and ask him to pick that up today and start taking the full doses again.  I think he is probably gotten significantly volume depleted and I am worried about its effects on his renal function.  I would like to recheck his lab work since this all seems to have started since the last set of labs we have.  We also will plan on collecting stool for enteric pathogens.  We will plan on him  coming in for a liter of fluids today or tomorrow.  3.  Chronic renal failure.  As above we will be rechecking his labs and giving him some extra fluids.  4.  Large spontaneous ecchymosis/hematoma in his right breast.  He is on Eliquis for his prior DVT. He has had a stable dose of the Eliquis for quite some time I do not see any other obvious change in his status to explain this. I would like to check his coags to be sure he has not not developed some other coagulopathy that is contributing to this.  If we do not find any other explicit explanation we will probably need to decrease his dose if he has further spontaneous ecchymoses.

## 2024-08-08 NOTE — TELEPHONE ENCOUNTER
[facility]ail Pharmacy Prior Authorization Team   Phone: [phone]    PA Initiation    Medication: NOVOLOG FLEXPEN 100 UNIT/ML SC SOPN  Insurance Company: [employer] - Phone [phone] Fax [phone]  Pharmacy Filling the Rx: [facility] - [place], [place] - [facility] [address]  Filling Pharmacy Phone: [phone]  Filling Pharmacy Fax:    Start Date: 8/8/2024    Started PA on CMM and a response of Member Not Found.  Called and spoke with Xavi at insurance.  Patient is active.   Xavi walked me through finding the PA form on the [employer] website since PA cannot be completed via phone.  Ref # [id number]    Manually faxed PA form to [phone].

## 2024-08-11 ENCOUNTER — INFUSION THERAPY VISIT (OUTPATIENT)
Dept: INFUSION THERAPY | Facility: CLINIC | Age: 59
End: 2024-08-11
Attending: INTERNAL MEDICINE
Payer: MEDICARE

## 2024-08-11 VITALS
SYSTOLIC BLOOD PRESSURE: 133 MMHG | RESPIRATION RATE: 16 BRPM | TEMPERATURE: 98 F | DIASTOLIC BLOOD PRESSURE: 78 MMHG | HEART RATE: 78 BPM

## 2024-08-11 DIAGNOSIS — N18.32 ANEMIA OF CHRONIC RENAL FAILURE, STAGE 3B (H): ICD-10-CM

## 2024-08-11 DIAGNOSIS — C78.6 MALIGNANT NEOPLASM METASTATIC TO PERITONEUM (H): ICD-10-CM

## 2024-08-11 DIAGNOSIS — N18.32 STAGE 3B CHRONIC KIDNEY DISEASE (H): ICD-10-CM

## 2024-08-11 DIAGNOSIS — Z94.4 LIVER TRANSPLANT RECIPIENT (H): ICD-10-CM

## 2024-08-11 DIAGNOSIS — C22.0 HCC (HEPATOCELLULAR CARCINOMA) (H): Primary | ICD-10-CM

## 2024-08-11 DIAGNOSIS — E11.3293 TYPE 2 DIABETES MELLITUS WITH MILD NONPROLIFERATIVE RETINOPATHY OF BOTH EYES WITHOUT MACULAR EDEMA, UNSPECIFIED WHETHER LONG TERM INSULIN USE (H): ICD-10-CM

## 2024-08-11 DIAGNOSIS — I82.452 ACUTE DEEP VEIN THROMBOSIS (DVT) OF LEFT PERONEAL VEIN (H): ICD-10-CM

## 2024-08-11 DIAGNOSIS — D63.1 ANEMIA OF CHRONIC RENAL FAILURE, STAGE 3B (H): ICD-10-CM

## 2024-08-11 LAB
ALBUMIN SERPL BCG-MCNC: 4.6 G/DL (ref 3.5–5.2)
ALP SERPL-CCNC: 98 U/L (ref 40–150)
ALT SERPL W P-5'-P-CCNC: 31 U/L (ref 0–70)
ANION GAP SERPL CALCULATED.3IONS-SCNC: 13 MMOL/L (ref 7–15)
APTT PPP: 33 SECONDS (ref 22–38)
AST SERPL W P-5'-P-CCNC: 31 U/L (ref 0–45)
BASOPHILS # BLD AUTO: 0 10E3/UL (ref 0–0.2)
BASOPHILS NFR BLD AUTO: 1 %
BILIRUB SERPL-MCNC: 0.4 MG/DL
BUN SERPL-MCNC: 23.1 MG/DL (ref 8–23)
CALCIUM SERPL-MCNC: 8.7 MG/DL (ref 8.8–10.4)
CHLORIDE SERPL-SCNC: 105 MMOL/L (ref 98–107)
CREAT SERPL-MCNC: 1.34 MG/DL (ref 0.67–1.17)
EGFRCR SERPLBLD CKD-EPI 2021: 61 ML/MIN/1.73M2
EOSINOPHIL # BLD AUTO: 0.2 10E3/UL (ref 0–0.7)
EOSINOPHIL NFR BLD AUTO: 3 %
ERYTHROCYTE [DISTWIDTH] IN BLOOD BY AUTOMATED COUNT: 16.1 % (ref 10–15)
FIBRINOGEN PPP-MCNC: 569 MG/DL (ref 170–510)
GLUCOSE SERPL-MCNC: 104 MG/DL (ref 70–99)
HCO3 SERPL-SCNC: 24 MMOL/L (ref 22–29)
HCT VFR BLD AUTO: 35.2 % (ref 40–53)
HGB BLD-MCNC: 10.9 G/DL (ref 13.3–17.7)
IMM GRANULOCYTES # BLD: 0.1 10E3/UL
IMM GRANULOCYTES NFR BLD: 2 %
INR PPP: 1.11 (ref 0.85–1.15)
LYMPHOCYTES # BLD AUTO: 0.9 10E3/UL (ref 0.8–5.3)
LYMPHOCYTES NFR BLD AUTO: 15 %
MCH RBC QN AUTO: 30.1 PG (ref 26.5–33)
MCHC RBC AUTO-ENTMCNC: 31 G/DL (ref 31.5–36.5)
MCV RBC AUTO: 97 FL (ref 78–100)
MONOCYTES # BLD AUTO: 0.3 10E3/UL (ref 0–1.3)
MONOCYTES NFR BLD AUTO: 5 %
NEUTROPHILS # BLD AUTO: 4.2 10E3/UL (ref 1.6–8.3)
NEUTROPHILS NFR BLD AUTO: 74 %
NRBC # BLD AUTO: 0 10E3/UL
NRBC BLD AUTO-RTO: 0 /100
PLATELET # BLD AUTO: 144 10E3/UL (ref 150–450)
POTASSIUM SERPL-SCNC: 4.2 MMOL/L (ref 3.4–5.3)
PROT SERPL-MCNC: 7.5 G/DL (ref 6.4–8.3)
RBC # BLD AUTO: 3.62 10E6/UL (ref 4.4–5.9)
SODIUM SERPL-SCNC: 142 MMOL/L (ref 135–145)
WBC # BLD AUTO: 5.6 10E3/UL (ref 4–11)

## 2024-08-11 PROCEDURE — 85610 PROTHROMBIN TIME: CPT

## 2024-08-11 PROCEDURE — 85730 THROMBOPLASTIN TIME PARTIAL: CPT

## 2024-08-11 PROCEDURE — 80053 COMPREHEN METABOLIC PANEL: CPT

## 2024-08-11 PROCEDURE — 85384 FIBRINOGEN ACTIVITY: CPT

## 2024-08-11 PROCEDURE — 36415 COLL VENOUS BLD VENIPUNCTURE: CPT

## 2024-08-11 PROCEDURE — 258N000003 HC RX IP 258 OP 636: Performed by: INTERNAL MEDICINE

## 2024-08-11 PROCEDURE — 85004 AUTOMATED DIFF WBC COUNT: CPT

## 2024-08-11 RX ORDER — DIPHENHYDRAMINE HYDROCHLORIDE 50 MG/ML
50 INJECTION INTRAMUSCULAR; INTRAVENOUS
Start: 2024-08-11

## 2024-08-11 RX ORDER — HEPARIN SODIUM,PORCINE 10 UNIT/ML
5-20 VIAL (ML) INTRAVENOUS DAILY PRN
OUTPATIENT
Start: 2024-08-11

## 2024-08-11 RX ORDER — EPINEPHRINE 1 MG/ML
0.3 INJECTION, SOLUTION INTRAMUSCULAR; SUBCUTANEOUS EVERY 5 MIN PRN
OUTPATIENT
Start: 2024-08-11

## 2024-08-11 RX ORDER — ALBUTEROL SULFATE 0.83 MG/ML
2.5 SOLUTION RESPIRATORY (INHALATION)
OUTPATIENT
Start: 2024-08-11

## 2024-08-11 RX ORDER — METHYLPREDNISOLONE SODIUM SUCCINATE 125 MG/2ML
125 INJECTION, POWDER, LYOPHILIZED, FOR SOLUTION INTRAMUSCULAR; INTRAVENOUS
Start: 2024-08-11

## 2024-08-11 RX ORDER — ALBUTEROL SULFATE 90 UG/1
1-2 AEROSOL, METERED RESPIRATORY (INHALATION)
Start: 2024-08-11

## 2024-08-11 RX ORDER — MEPERIDINE HYDROCHLORIDE 25 MG/ML
25 INJECTION INTRAMUSCULAR; INTRAVENOUS; SUBCUTANEOUS EVERY 30 MIN PRN
OUTPATIENT
Start: 2024-08-11

## 2024-08-11 RX ORDER — HEPARIN SODIUM (PORCINE) LOCK FLUSH IV SOLN 100 UNIT/ML 100 UNIT/ML
5 SOLUTION INTRAVENOUS
OUTPATIENT
Start: 2024-08-11

## 2024-08-11 RX ADMIN — SODIUM CHLORIDE 1000 ML: 9 INJECTION, SOLUTION INTRAVENOUS at 09:30

## 2024-08-11 NOTE — PROGRESS NOTES
Nursing Note  Frandy Workman presents today to [facility] for:   Chief Complaint   Patient presents with    Blood Draw     Labs drawn via PIV access by lab RN    Infusion     IV fluids     During today's [facility] appointment, orders from Dr. Villarreal were completed.  Frequency: once    Progress note:  Patient identification verified by name and date of birth.  Assessment completed.  Vitals recorded in Doc Flowsheets.  Patient was provided with education regarding medication/procedure and possible side effects.  Patient verbalized understanding.     present during visit today: Not Applicable.    Treatment Conditions: Patient denies fever, chills, signs of infection, recent illness, antibiotics use, productive cough or elevated temperature.  Premedications: were not ordered.  Drug Waste Record: No  Infusion length and rate:  999 ml/hr., over hour  Labs: were drawn per orders.   Vascular access: peripheral IV was placed by vascular access nurse. There happened to be one here so were able to get access with the ultrasound.  Is the next appt scheduled? Today was as needed infusion, patient will follow up with ordering provider    Post Infusion Assessment:  Patient tolerated infusion without incident.  Site patent and intact, free from redness, edema or discomfort.  No evidence of extravasations.  Access discontinued per protocol.     Discharge Plan:   Follow up plan of care with: ordering provider as scheduled.  Discharge instructions were reviewed with patient.  Patient/representative verbalized understanding of discharge instructions and all questions answered.  Patient discharged from [facility] in stable condition.    Mraie Gomez RN    Administrations This Visit       sodium chloride 0.9% BOLUS 1,000 mL       Admin Date  08/11/2024 Action  $New Bag Dose  1,000 mL Route  Intravenous Documented By  Marie Gomez  TRACE, RN                    /78   Pulse 78   Temp 98  F (36.7  C) (Oral)   Resp 16

## 2024-08-11 NOTE — NURSING NOTE
Chief Complaint   Patient presents with    Blood Draw     Labs drawn via PIV access by lab RN       IV placement with blood draw by lab RN via ultrasound.     Hellen Serna RN

## 2024-08-12 ENCOUNTER — OFFICE VISIT (OUTPATIENT)
Dept: ORTHOPEDICS | Facility: CLINIC | Age: 59
End: 2024-08-12
Payer: MEDICARE

## 2024-08-12 DIAGNOSIS — L84 TYPE 2 DIABETES MELLITUS WITH PRESSURE CALLUS (H): ICD-10-CM

## 2024-08-12 DIAGNOSIS — M21.969 TYPE 2 DIABETES MELLITUS WITH DIABETIC FOOT DEFORMITY (H): ICD-10-CM

## 2024-08-12 DIAGNOSIS — E11.628 TYPE 2 DIABETES MELLITUS WITH PRESSURE CALLUS (H): ICD-10-CM

## 2024-08-12 DIAGNOSIS — E11.69 TYPE 2 DIABETES MELLITUS WITH DIABETIC FOOT DEFORMITY (H): ICD-10-CM

## 2024-08-12 DIAGNOSIS — L60.2 ONYCHAUXIS: ICD-10-CM

## 2024-08-12 DIAGNOSIS — E11.49 TYPE II OR UNSPECIFIED TYPE DIABETES MELLITUS WITH NEUROLOGICAL MANIFESTATIONS, NOT STATED AS UNCONTROLLED(250.60) (H): Primary | ICD-10-CM

## 2024-08-12 PROCEDURE — 99212 OFFICE O/P EST SF 10 MIN: CPT | Mod: 25 | Performed by: PODIATRIST

## 2024-08-12 PROCEDURE — 11056 PARNG/CUTG B9 HYPRKR LES 2-4: CPT | Performed by: PODIATRIST

## 2024-08-12 PROCEDURE — G0127 TRIM NAIL(S): HCPCS | Performed by: PODIATRIST

## 2024-08-12 RX ORDER — INSULIN ASPART INJECTION 100 [IU]/ML
INJECTION, SOLUTION SUBCUTANEOUS
Qty: 45 ML | Refills: 3 | Status: SHIPPED | OUTPATIENT
Start: 2024-08-12

## 2024-08-12 NOTE — LETTER
8/12/2024      Frandy Workman  [address] [facility] [address]      Dear Colleague,    Thank you for referring your patient, Frandy Workman, to the [facility] [place]. Please see a copy of my visit note below.    Past Medical History:   Diagnosis Date    Anemia 2013    Arthritis     BPH (benign prostatic hyperplasia)     CAD (coronary artery disease) 04/01/2019    Cholelithiasis     Conductive hearing loss 08/16/2017    Depressive disorder 1986    Suffer effects throughout life    Gastroesophageal reflux disease 12/01/2014    HCC (hepatocellular carcinoma) (H) 01/22/2019    History of blood transfusion 2019    Had blood transfussion until Dec 2022    History of diabetic retinopathy 07/2018    HTN (hypertension) 11/20/2019    Hyperlipidemia     Liver cirrhosis secondary to ESTRADA (H)     Liver transplanted (H) 11/11/2019    Portal vein thrombosis 04/11/2019    SCC (squamous cell carcinoma) 02/15/2023    02/15/23:  Left temporal scalp    Type II diabetes mellitus (H)      Patient Active Problem List   Diagnosis    Bipolar affective disorder in remission (H24)    Esophageal varices determined by endoscopy (H)    Erectile dysfunction due to diseases classified elsewhere    HCC (hepatocellular carcinoma) (H)    Equivocal stress echocardiogram    Type 2 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, unspecified whether long term insulin use (H)    Status post coronary angiogram    CAD (coronary artery disease)    Liver transplant recipient (H)    Status post liver transplantation (H)    Immunosuppressed status (H24)    Benign essential hypertension    Anemia of chronic renal failure, stage 3a (H)    History of coronary artery disease    Dyslipidemia    Excessive sweating    Stage 3b chronic kidney disease (H)    Anemia of chronic renal failure, stage 3b (H)    NSTEMI (non-ST elevated myocardial infarction) (H)    Chest pain, unspecified type     Hypertensive chronic kidney disease with stage 5 chronic kidney disease or end stage renal disease (H)    Vitreous hemorrhage of left eye (H)    Proliferative diabetic retinopathy of both eyes associated with type 2 diabetes mellitus, unspecified proliferative retinopathy type (H)    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)    Liver replaced by transplant (H)    Hyperkalemia    Acute renal failure, unspecified acute renal failure type (H24)    ARIELA (obstructive sleep apnea)    History of nonmelanoma skin cancer    Neoplasm of unspecified behavior of bone, soft tissue, and skin    SCC (squamous cell carcinoma)     Past Surgical History:   Procedure Laterality Date    ABDOMEN SURGERY  11/11/2019    Had Liver Transplant    BIOPSY  12/2022    Had biopsy done will have additional procedure 2/15/2023    BYPASS GRAFT ARTERY CORONARY N/A 07/14/2021    Procedure: median sternotomy, on cardiopulmonary bypass, CORONARY ARTERY BYPASS GRAFT (CABG) x2 with left greater saphenous vein endoscopic harvest and left internal mammery artery harvest;  Surgeon: Tom Zapata MD;  Location: [facility] OR    CARDIAC SURGERY  7/2021    Heart Graph. and bypass surgery    CHOLECYSTECTOMY  11/11/2022    Removed with Liver Transplant    COLONOSCOPY      2015    COLONOSCOPY N/A 12/06/2019    Procedure: COLONOSCOPY, WITH POLYPECTOMY AND BIOPSY;  Surgeon: Adam Morton MD;  Location:  GI    CV CENTRAL VENOUS CATHETER PLACEMENT N/A 07/12/2021    Procedure: Central Venous Catheter Placement;  Surgeon: Fermin Polanco MD;  Location: [facility] CARDIAC CATH LAB    CV CORONARY ANGIOGRAM N/A 07/12/2021    Procedure: Coronary Angiogram;  Surgeon: Fermin Polanco MD;  Location: [facility] CARDIAC CATH LAB    CV HEART CATHETERIZATION WITH POSSIBLE INTERVENTION N/A 02/26/2019    Procedure: CORS;  Surgeon: Jagdish Hoyt MD;  Location: [facility] CARDIAC CATH LAB    CV INTRA AORTIC BALLOON N/A 07/12/2021     Procedure: Intra Aortic Balloon Pump Insertion;  Surgeon: Fermin Polanco MD;  Location:  HEART CARDIAC CATH LAB    ESOPHAGOSCOPY, GASTROSCOPY, DUODENOSCOPY (EGD), COMBINED N/A 11/17/2016    Procedure: COMBINED ESOPHAGOSCOPY, GASTROSCOPY, DUODENOSCOPY (EGD);  Surgeon: Santi Rosas MD;  Location:  GI    ESOPHAGOSCOPY, GASTROSCOPY, DUODENOSCOPY (EGD), COMBINED N/A 11/17/2017    Procedure: COMBINED ESOPHAGOSCOPY, GASTROSCOPY, DUODENOSCOPY (EGD);  EGD;  Surgeon: Santi Rosas MD;  Location:  GI    ESOPHAGOSCOPY, GASTROSCOPY, DUODENOSCOPY (EGD), COMBINED N/A 12/28/2018    Procedure: EGD;  Surgeon: Santi Rosas MD;  Location:  OR    ESOPHAGOSCOPY, GASTROSCOPY, DUODENOSCOPY (EGD), COMBINED N/A 12/06/2019    Procedure: ESOPHAGOGASTRODUODENOSCOPY, WITH BIOPSY;  Surgeon: Adam Morton MD;  Location:  GI    ESOPHAGOSCOPY, GASTROSCOPY, DUODENOSCOPY (EGD), COMBINED N/A 02/13/2020    Procedure: ESOPHAGOGASTRODUODENOSCOPY (EGD);  Surgeon: Santi Rosas MD;  Location:  GI    EXCISE MASS ABDOMEN N/A 07/13/2023    Procedure: Laparoscopic lysis of adhesions, laparoscopic resection of abdominal mass;  Surgeon: Ruiz Chu MD;  Location:  OR    HEAD & NECK SURGERY      12/2017 at [facility].     IMPLANT GOLD WEIGHT EYELID Right 11/16/2017    Procedure: IMPLANT WEIGHT EYELID;  Right Upper Eyelid Weight, right tarsal strip lower eyelid;  Surgeon: Milana Malave MD;  [facility]    IR CHEMO EMBOLIZATION  01/22/2019    KNEE SURGERY Left     ORTHOPEDIC SURGERY      PAROTIDECTOMY, RADICAL NECK DISSECTION Right 11/02/2017    Procedure: PAROTIDECTOMY, RADICAL NECK DISSECTION;  Right Superfacial Parotidectomy , Facial nerve repair. with [facility] facial nerve monitor.;  Surgeon: Asiya Morgan MD;  Location:  OR    PHACOEMULSIFICATION CLEAR CORNEA WITH STANDARD INTRAOCULAR LENS IMPLANT Right 01/24/2023    Procedure: PHACOEMULSIFICATION, COMPLEX CATARACT, WITH  INTRAOCULAR LENS IMPLANT WITH TRYPAN RIGHT EYE;  Surgeon: Enriqueta Martin MD;  Location: [facility] OR    PHACOEMULSIFICATION WITH STANDARD INTRAOCULAR LENS IMPLANT Left 2023    Procedure: PHACOEMULSIFICATION, COMPLEX CATARACT, WITH STANDARD INTRAOCULAR LENS IMPLANT INSERTION LEFT EYE;  Surgeon: Enriqueta Martin MD;  Location: [facility] OR    PICC INSERTION Left 2017    4fr SL BioFlo PICC, 44cm in the L basilic vein w/ tip in the low SVC    RETURN LIVER TRANSPLANT N/A 2019    Procedure: Exploratory laparotomy, hematoma evacuation, abdominal washout;  Surgeon: Александр Ramos MD;  Location: [facility] OR    TRANSPLANT LIVER RECIPIENT  DONOR N/A 2019    Procedure: TRANSPLANT, LIVER, RECIPIENT,  DONOR;  Surgeon: Александр Ramos MD;  Location: [facility] OR    VASCULAR SURGERY       Social History     Socioeconomic History    Marital status:      Spouse name: Not on file    Number of children: Not on file    Years of education: Not on file    Highest education level: Bachelor's degree (e.g., BA, AB, BS)   Occupational History    Not on file   Tobacco Use    Smoking status: Former     Types: Dip, chew, snus or snuff     Passive exposure: Past    Smokeless tobacco: Former     Types: Chew     Quit date: 10/31/2017    Tobacco comments:     Quit [place] after Doctor told me in 2017 that if I didn't I would   Vaping Use    Vaping status: Never Used   Substance and Sexual Activity    Alcohol use: No     Comment: quit 1996    Drug use: No    Sexual activity: Not Currently     Partners: Female     Birth control/protection: Condom   Other Topics Concern    Parent/sibling w/ CABG, MI or angioplasty before 65F 55M? No   Social History Narrative    Prior , [place]     Social Determinants of Health     Financial Resource Strain: Low Risk  (2024)    Financial Resource Strain     Within the past 12 months, have you or your family members you live with been unable to get  utilities (heat, electricity) when it was really needed?: No   Food Insecurity: Low Risk  (1/23/2024)    Food Insecurity     Within the past 12 months, did you worry that your food would run out before you got money to buy more?: No     Within the past 12 months, did the food you bought just not last and you didn t have money to get more?: No   Transportation Needs: Low Risk  (1/23/2024)    Transportation Needs     Within the past 12 months, has lack of transportation kept you from medical appointments, getting your medicines, non-medical meetings or appointments, work, or from getting things that you need?: No   Physical Activity: Insufficiently Active (10/13/2020)    Exercise Vital Sign     Days of Exercise per Week: 1 day     Minutes of Exercise per Session: 60 min   Stress: Stress Concern Present (10/13/2020)    [employer] [employer] [employer] - Occupational Stress Questionnaire     Feeling of Stress : To some extent   Social Connections: Socially Isolated (10/13/2020)    Social Connection and Isolation Panel [NHANES]     Frequency of Communication with Friends and Family: Once a week     Frequency of Social Gatherings with Friends and Family: Once a week     Attends Anabaptism Services: Never     Active Member of Clubs or Organizations: No     Attends Club or Organization Meetings: Never     Marital Status:    Interpersonal Safety: Low Risk  (4/30/2024)    Interpersonal Safety     Do you feel physically and emotionally safe where you currently live?: Yes     Within the past 12 months, have you been hit, slapped, kicked or otherwise physically hurt by someone?: No     Within the past 12 months, have you been humiliated or emotionally abused in other ways by your partner or ex-partner?: No   Housing Stability: Low Risk  (1/23/2024)    Housing Stability     Do you have housing? : Yes     Are you worried about losing your housing?: No     Family History   Problem Relation Age of Onset    Skin Cancer  "Mother     Cancer Mother         mastectomy    Diabetes Mother          3/2016    Cerebrovascular Disease Mother         Passed away in Feb of this year, 80 years old.    Thyroid Disease Mother     Depression Mother     Breast Cancer Mother     Obesity Mother         280 pounds  from this and complications from D    Pancreatic Cancer Father 60    Prostate Cancer Father         Currently in Remission    Macular Degeneration Father     Glaucoma Father     Skin Cancer Father     Coronary Artery Disease Father         Started in his 70's  at 88 in [place]     Colon Cancer Father     Thyroid Disease Sister     Depression Sister     Asthma Sister     Sleep Apnea Brother     Colorectal Cancer Maternal Grandmother     Cancer Maternal Grandmother     Substance Abuse Maternal Grandmother         Alcohol    Prostate Cancer Maternal Grandfather     Substance Abuse Maternal Grandfather         Alcohol    Colorectal Cancer Paternal Grandmother     Asthma Sister         Had since birth    Liver Disease No family hx of     Melanoma No family hx of     Anesthesia Reaction No family hx of     Deep Vein Thrombosis (DVT) No family hx of        Lab Results   Component Value Date    A1C 5.6 2024    A1C 5.7 2023    A1C 6.3 2023    A1C 6.9 2022    A1C 6.0 01/10/2022    A1C 6.8 2021    A1C 6.0 2020    A1C 6.3 2020    A1C 6.6 2018    A1C 6.5 2017    A1C 7.8 10/25/2016     Inr         1.11       2024.    Lab Results   Component Value Date    WBC 5.6 2024    WBC 4.4 2021     Lab Results   Component Value Date    RBC 3.62 2024    RBC 2.35 2021     Lab Results   Component Value Date    HGB 10.9 2024    HGB 7.3 2021     Lab Results   Component Value Date    HCT 35.2 2024    HCT 22.6 2021     No components found for: \"MCT\"  Lab Results   Component Value Date    MCV 97 2024    MCV 96 2021     Lab Results " "  Component Value Date    MCH 30.1 08/11/2024    MCH 31.1 06/28/2021     Lab Results   Component Value Date    MCHC 31.0 08/11/2024    MCHC 32.3 06/28/2021     Lab Results   Component Value Date    RDW 16.1 08/11/2024    RDW 15.1 06/28/2021     Lab Results   Component Value Date     08/11/2024     06/28/2021   Last Comprehensive Metabolic Panel:  Lab Results   Component Value Date     08/11/2024    POTASSIUM 4.2 08/11/2024    CHLORIDE 105 08/11/2024    CO2 24 08/11/2024    ANIONGAP 13 08/11/2024     (H) 08/11/2024    BUN 23.1 (H) 08/11/2024    CR 1.34 (H) 08/11/2024    GFRESTIMATED 61 08/11/2024    DEBORAH 8.7 (L) 08/11/2024       Lab Results   Component Value Date    AST 31 08/11/2024    AST 9 06/28/2021     Lab Results   Component Value Date    ALT 31 08/11/2024    ALT 20 06/28/2021     No results found for: \"BILICONJ\"   Lab Results   Component Value Date    BILITOTAL 0.4 08/11/2024    BILITOTAL 0.5 06/28/2021     Lab Results   Component Value Date    ALBUMIN 4.6 08/11/2024    ALBUMIN 4.3 07/20/2022    ALBUMIN 4.0 06/28/2021     Lab Results   Component Value Date    PROTTOTAL 7.5 08/11/2024    PROTTOTAL 7.4 06/28/2021      Lab Results   Component Value Date    ALKPHOS 98 08/11/2024    ALKPHOS 98 06/28/2021         Subjective findings- 59-year-old returns clinic for diabetic foot cares.  Relates he is doing relatively well, relates to no ulcers or sores since we seen him last, relates he has Diabetic shoes, relates to numbness tingling and Neuropathy in his feet, relates he needs his toenails cut.       Objective findings- DP and PT are 2 out of 4 bilaterally.  Peripheral edema bilaterally.  Has decreased hair growth bilaterally.  Has dorsally contracted digits bilaterally.  He has incurvated nails with some of them broken off With thickening dystrophy and subungual debris digits during degrees bilaterally.  He has hyperkeratotic tissue buildup plantar fifth MPJ left, plantar first MPJ right, " plantar fourth MPJ right, and plantar fifth MPJ right.  Has some scaliness of the right fourth MPJ callus.  There is no erythema, no drainage, no odor, no calor, no pain on palpation bilaterally.     Assessment and plan- Onychauxis and Onychomycosis bilaterally, Diabetes with peripheral Neuropathy with foot deformity and callus.  Diagnosis and treatment options discussed with the patient.  Advised him on moisturizing lotion use.  All the toenails were debrided or reduced bilaterally upon consent.  All the tylomas bilaterally were sharp debrided with a 10 blade upon consent.  Continue diabetic shoes.  Previous notes reviewed.  Return to clinic and see me in 2 to 3 months.       Moderate level of medical decision making.      Again, thank you for allowing me to participate in the care of your patient.        Sincerely,        Lamin Gonzalez DPM

## 2024-08-12 NOTE — NURSING NOTE
Reason For Visit:   Chief Complaint   Patient presents with    Follow Up     3 month follow up. Diabetic foot check.        There were no vitals taken for this visit.    Pain Assessment  Patient Currently in Pain: Yes  Patient's Stated Pain Goal: 5  0-10 Pain Scale: 5  Primary Pain Location: Foot (Bilateral)    Melodie Marmolejo CMA

## 2024-08-12 NOTE — PROGRESS NOTES
Past Medical History:   Diagnosis Date    Anemia 2013    Arthritis     BPH (benign prostatic hyperplasia)     CAD (coronary artery disease) 04/01/2019    Cholelithiasis     Conductive hearing loss 08/16/2017    Depressive disorder 1986    Suffer effects throughout life    Gastroesophageal reflux disease 12/01/2014    HCC (hepatocellular carcinoma) (H) 01/22/2019    History of blood transfusion 2019    Had blood transfussion until Dec 2022    History of diabetic retinopathy 07/2018    HTN (hypertension) 11/20/2019    Hyperlipidemia     Liver cirrhosis secondary to ESTRADA (H)     Liver transplanted (H) 11/11/2019    Portal vein thrombosis 04/11/2019    SCC (squamous cell carcinoma) 02/15/2023    02/15/23:  Left temporal scalp    Type II diabetes mellitus (H)      Patient Active Problem List   Diagnosis    Bipolar affective disorder in remission (H24)    Esophageal varices determined by endoscopy (H)    Erectile dysfunction due to diseases classified elsewhere    HCC (hepatocellular carcinoma) (H)    Equivocal stress echocardiogram    Type 2 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, unspecified whether long term insulin use (H)    Status post coronary angiogram    CAD (coronary artery disease)    Liver transplant recipient (H)    Status post liver transplantation (H)    Immunosuppressed status (H24)    Benign essential hypertension    Anemia of chronic renal failure, stage 3a (H)    History of coronary artery disease    Dyslipidemia    Excessive sweating    Stage 3b chronic kidney disease (H)    Anemia of chronic renal failure, stage 3b (H)    NSTEMI (non-ST elevated myocardial infarction) (H)    Chest pain, unspecified type    Hypertensive chronic kidney disease with stage 5 chronic kidney disease or end stage renal disease (H)    Vitreous hemorrhage of left eye (H)    Proliferative diabetic retinopathy of both eyes associated with type 2 diabetes mellitus, unspecified  proliferative retinopathy type (H)    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)    Liver replaced by transplant (H)    Hyperkalemia    Acute renal failure, unspecified acute renal failure type (H24)    ARIELA (obstructive sleep apnea)    History of nonmelanoma skin cancer    Neoplasm of unspecified behavior of bone, soft tissue, and skin    SCC (squamous cell carcinoma)     Past Surgical History:   Procedure Laterality Date    ABDOMEN SURGERY  11/11/2019    Had Liver Transplant    BIOPSY  12/2022    Had biopsy done will have additional procedure 2/15/2023    BYPASS GRAFT ARTERY CORONARY N/A 07/14/2021    Procedure: median sternotomy, on cardiopulmonary bypass, CORONARY ARTERY BYPASS GRAFT (CABG) x2 with left greater saphenous vein endoscopic harvest and left internal mammery artery harvest;  Surgeon: Tom Zapata MD;  Location: [facility] OR    CARDIAC SURGERY  7/2021    Heart Graph. and bypass surgery    CHOLECYSTECTOMY  11/11/2022    Removed with Liver Transplant    COLONOSCOPY      2015    COLONOSCOPY N/A 12/06/2019    Procedure: COLONOSCOPY, WITH POLYPECTOMY AND BIOPSY;  Surgeon: Adam Morton MD;  Location:  GI    CV CENTRAL VENOUS CATHETER PLACEMENT N/A 07/12/2021    Procedure: Central Venous Catheter Placement;  Surgeon: Fermin Polanco MD;  Location: [facility] CARDIAC CATH LAB    CV CORONARY ANGIOGRAM N/A 07/12/2021    Procedure: Coronary Angiogram;  Surgeon: Fermin Polanco MD;  Location: [facility] CARDIAC CATH LAB    CV HEART CATHETERIZATION WITH POSSIBLE INTERVENTION N/A 02/26/2019    Procedure: CORS;  Surgeon: Jagdish Hoyt MD;  Location: [facility] CARDIAC CATH LAB    CV INTRA AORTIC BALLOON N/A 07/12/2021    Procedure: Intra Aortic Balloon Pump Insertion;  Surgeon: Fermin Polanco MD;  Location: [facility] CARDIAC CATH LAB    ESOPHAGOSCOPY, GASTROSCOPY, DUODENOSCOPY (EGD), COMBINED N/A 11/17/2016    Procedure: COMBINED ESOPHAGOSCOPY,  GASTROSCOPY, DUODENOSCOPY (EGD);  Surgeon: Santi Rosas MD;  Location: [facility] GI    ESOPHAGOSCOPY, GASTROSCOPY, DUODENOSCOPY (EGD), COMBINED N/A 11/17/2017    Procedure: COMBINED ESOPHAGOSCOPY, GASTROSCOPY, DUODENOSCOPY (EGD);  EGD;  Surgeon: Santi Rosas MD;  Location: [facility] GI    ESOPHAGOSCOPY, GASTROSCOPY, DUODENOSCOPY (EGD), COMBINED N/A 12/28/2018    Procedure: EGD;  Surgeon: Santi Rosas MD;  [facility]    ESOPHAGOSCOPY, GASTROSCOPY, DUODENOSCOPY (EGD), COMBINED N/A 12/06/2019    Procedure: ESOPHAGOGASTRODUODENOSCOPY, WITH BIOPSY;  Surgeon: Adam Morton MD;  Location: [facility] GI    ESOPHAGOSCOPY, GASTROSCOPY, DUODENOSCOPY (EGD), COMBINED N/A 02/13/2020    Procedure: ESOPHAGOGASTRODUODENOSCOPY (EGD);  Surgeon: Santi Rosas MD;  Location: [facility] GI    EXCISE MASS ABDOMEN N/A 07/13/2023    Procedure: Laparoscopic lysis of adhesions, laparoscopic resection of abdominal mass;  Surgeon: Ruiz Cuh MD;  Location:  OR    HEAD & NECK SURGERY      12/2017 at [facility].     IMPLANT GOLD WEIGHT EYELID Right 11/16/2017    Procedure: IMPLANT WEIGHT EYELID;  Right Upper Eyelid Weight, right tarsal strip lower eyelid;  Surgeon: Milana Malave MD;  [facility]    IR CHEMO EMBOLIZATION  01/22/2019    KNEE SURGERY Left     ORTHOPEDIC SURGERY      PAROTIDECTOMY, RADICAL NECK DISSECTION Right 11/02/2017    Procedure: PAROTIDECTOMY, RADICAL NECK DISSECTION;  Right Superfacial Parotidectomy , Facial nerve repair. with [facility] facial nerve monitor.;  Surgeon: Asiya Morgan MD;  Location: [facility] OR    PHACOEMULSIFICATION CLEAR CORNEA WITH STANDARD INTRAOCULAR LENS IMPLANT Right 01/24/2023    Procedure: PHACOEMULSIFICATION, COMPLEX CATARACT, WITH INTRAOCULAR LENS IMPLANT WITH TRYPAN RIGHT EYE;  Surgeon: Enriqueta Martin MD;  Location: [facility] OR    PHACOEMULSIFICATION WITH STANDARD INTRAOCULAR LENS IMPLANT Left 01/03/2023    Procedure: PHACOEMULSIFICATION, COMPLEX CATARACT, WITH  STANDARD INTRAOCULAR LENS IMPLANT INSERTION LEFT EYE;  Surgeon: Enriqueta Martin MD;  Location: [facility] OR    PICC INSERTION Left 2017    4fr SL BioFlo PICC, 44cm in the L basilic vein w/ tip in the low SVC    RETURN LIVER TRANSPLANT N/A 2019    Procedure: Exploratory laparotomy, hematoma evacuation, abdominal washout;  Surgeon: Александр Ramos MD;  Location: [facility] OR    TRANSPLANT LIVER RECIPIENT  DONOR N/A 2019    Procedure: TRANSPLANT, LIVER, RECIPIENT,  DONOR;  Surgeon: Александр Ramos MD;  Location: [facility] OR    VASCULAR SURGERY       Social History     Socioeconomic History    Marital status:      Spouse name: Not on file    Number of children: Not on file    Years of education: Not on file    Highest education level: Bachelor's degree (e.g., BA, AB, BS)   Occupational History    Not on file   Tobacco Use    Smoking status: Former     Types: Dip, chew, snus or snuff     Passive exposure: Past    Smokeless tobacco: Former     Types: Chew     Quit date: 10/31/2017    Tobacco comments:     Quit [place] after Doctor told me in 2017 that if I didn't I would   Vaping Use    Vaping status: Never Used   Substance and Sexual Activity    Alcohol use: No     Comment: quit 1996    Drug use: No    Sexual activity: Not Currently     Partners: Female     Birth control/protection: Condom   Other Topics Concern    Parent/sibling w/ CABG, MI or angioplasty before 65F 55M? No   Social History Narrative    Prior , [facility]     Social Determinants of Health     Financial Resource Strain: Low Risk  (2024)    Financial Resource Strain     Within the past 12 months, have you or your family members you live with been unable to get utilities (heat, electricity) when it was really needed?: No   Food Insecurity: Low Risk  (2024)    Food Insecurity     Within the past 12 months, did you worry that your food would run out before you got money to buy more?: No      Within the past 12 months, did the food you bought just not last and you didn t have money to get more?: No   Transportation Needs: Low Risk  (2024)    Transportation Needs     Within the past 12 months, has lack of transportation kept you from medical appointments, getting your medicines, non-medical meetings or appointments, work, or from getting things that you need?: No   Physical Activity: Insufficiently Active (10/13/2020)    Exercise Vital Sign     Days of Exercise per Week: 1 day     Minutes of Exercise per Session: 60 min   Stress: Stress Concern Present (10/13/2020)    [employer] - Occupational Stress Questionnaire     Feeling of Stress : To some extent   Social Connections: Socially Isolated (10/13/2020)    Social Connection and Isolation Panel [NHANES]     Frequency of Communication with Friends and Family: Once a week     Frequency of Social Gatherings with Friends and Family: Once a week     Attends Synagogue Services: Never     Active Member of Clubs or Organizations: No     Attends Club or Organization Meetings: Never     Marital Status:    Interpersonal Safety: Low Risk  (2024)    Interpersonal Safety     Do you feel physically and emotionally safe where you currently live?: Yes     Within the past 12 months, have you been hit, slapped, kicked or otherwise physically hurt by someone?: No     Within the past 12 months, have you been humiliated or emotionally abused in other ways by your partner or ex-partner?: No   Housing Stability: Low Risk  (2024)    Housing Stability     Do you have housing? : Yes     Are you worried about losing your housing?: No     Family History   Problem Relation Age of Onset    Skin Cancer Mother     Cancer Mother         mastectomy    Diabetes Mother          3/2016    Cerebrovascular Disease Mother         Passed away in Feb of this year, 80 years old.    Thyroid Disease Mother     Depression Mother     Breast  "Cancer Mother     Obesity Mother         280 pounds  from this and complications from D    Pancreatic Cancer Father 60    Prostate Cancer Father         Currently in Remission    Macular Degeneration Father     Glaucoma Father     Skin Cancer Father     Coronary Artery Disease Father         Started in his 70's  at 88 in Octobe2023    Colon Cancer Father     Thyroid Disease Sister     Depression Sister     Asthma Sister     Sleep Apnea Brother     Colorectal Cancer Maternal Grandmother     Cancer Maternal Grandmother     Substance Abuse Maternal Grandmother         Alcohol    Prostate Cancer Maternal Grandfather     Substance Abuse Maternal Grandfather         Alcohol    Colorectal Cancer Paternal Grandmother     Asthma Sister         Had since birth    Liver Disease No family hx of     Melanoma No family hx of     Anesthesia Reaction No family hx of     Deep Vein Thrombosis (DVT) No family hx of        Lab Results   Component Value Date    A1C 5.6 2024    A1C 5.7 2023    A1C 6.3 2023    A1C 6.9 2022    A1C 6.0 01/10/2022    A1C 6.8 2021    A1C 6.0 2020    A1C 6.3 2020    A1C 6.6 2018    A1C 6.5 2017    A1C 7.8 10/25/2016     Inr         1.11       2024.    Lab Results   Component Value Date    WBC 5.6 2024    WBC 4.4 2021     Lab Results   Component Value Date    RBC 3.62 2024    RBC 2.35 2021     Lab Results   Component Value Date    HGB 10.9 2024    HGB 7.3 2021     Lab Results   Component Value Date    HCT 35.2 2024    HCT 22.6 2021     No components found for: \"MCT\"  Lab Results   Component Value Date    MCV 97 2024    MCV 96 2021     Lab Results   Component Value Date    MCH 30.1 2024    MCH 31.1 2021     Lab Results   Component Value Date    MCHC 31.0 2024    MCHC 32.3 2021     Lab Results   Component Value Date    RDW 16.1 2024    RDW 15.1 2021 " "    Lab Results   Component Value Date     08/11/2024     06/28/2021   Last Comprehensive Metabolic Panel:  Lab Results   Component Value Date     08/11/2024    POTASSIUM 4.2 08/11/2024    CHLORIDE 105 08/11/2024    CO2 24 08/11/2024    ANIONGAP 13 08/11/2024     (H) 08/11/2024    BUN 23.1 (H) 08/11/2024    CR 1.34 (H) 08/11/2024    GFRESTIMATED 61 08/11/2024    DEBORAH 8.7 (L) 08/11/2024       Lab Results   Component Value Date    AST 31 08/11/2024    AST 9 06/28/2021     Lab Results   Component Value Date    ALT 31 08/11/2024    ALT 20 06/28/2021     No results found for: \"BILICONJ\"   Lab Results   Component Value Date    BILITOTAL 0.4 08/11/2024    BILITOTAL 0.5 06/28/2021     Lab Results   Component Value Date    ALBUMIN 4.6 08/11/2024    ALBUMIN 4.3 07/20/2022    ALBUMIN 4.0 06/28/2021     Lab Results   Component Value Date    PROTTOTAL 7.5 08/11/2024    PROTTOTAL 7.4 06/28/2021      Lab Results   Component Value Date    ALKPHOS 98 08/11/2024    ALKPHOS 98 06/28/2021         Subjective findings- 59-year-old returns clinic for diabetic foot cares.  Relates he is doing relatively well, relates to no ulcers or sores since we seen him last, relates he has Diabetic shoes, relates to numbness tingling and Neuropathy in his feet, relates he needs his toenails cut.       Objective findings- DP and PT are 2 out of 4 bilaterally.  Peripheral edema bilaterally.  Has decreased hair growth bilaterally.  Has dorsally contracted digits bilaterally.  He has incurvated nails with some of them broken off With thickening dystrophy and subungual debris digits during degrees bilaterally.  He has hyperkeratotic tissue buildup plantar fifth MPJ left, plantar first MPJ right, plantar fourth MPJ right, and plantar fifth MPJ right.  Has some scaliness of the right fourth MPJ callus.  There is no erythema, no drainage, no odor, no calor, no pain on palpation bilaterally.     Assessment and plan- Onychauxis and " Onychomycosis bilaterally, Diabetes with peripheral Neuropathy with foot deformity and callus.  Diagnosis and treatment options discussed with the patient.  Advised him on moisturizing lotion use.  All the toenails were debrided or reduced bilaterally upon consent.  All the tylomas bilaterally were sharp debrided with a 10 blade upon consent.  Continue diabetic shoes.  Previous notes reviewed.  Return to clinic and see me in 2 to 3 months.                                                                   Moderate level of medical decision making.

## 2024-08-16 DIAGNOSIS — Z95.1 S/P CABG (CORONARY ARTERY BYPASS GRAFT): ICD-10-CM

## 2024-08-16 RX ORDER — INSULIN ASPART 100 [IU]/ML
INJECTION, SOLUTION INTRAVENOUS; SUBCUTANEOUS
Qty: 15 ML | Refills: 11 | Status: SHIPPED | OUTPATIENT
Start: 2024-08-16

## 2024-08-16 NOTE — TELEPHONE ENCOUNTER
NovoLOG FlexPen 100UNIT/ML SOPN*      Last Written Prescription Date:  12/19/23  Last Fill Quantity: 45 ml ,   # refills: 1   Last Clinic Visit: 5/13/24  NV: 11/18/24    Routing refill request to provider for review/approval because:  Insulin and insulin pump supplies - refilled per Endocrine clinic.

## 2024-08-19 ENCOUNTER — TELEPHONE (OUTPATIENT)
Dept: ONCOLOGY | Facility: CLINIC | Age: 59
End: 2024-08-19
Payer: MEDICARE

## 2024-08-19 ENCOUNTER — MYC MEDICAL ADVICE (OUTPATIENT)
Dept: FAMILY MEDICINE | Facility: CLINIC | Age: 59
End: 2024-08-19
Payer: MEDICARE

## 2024-08-19 NOTE — TELEPHONE ENCOUNTER
Left Voicemail (1st Attempt) and Sent Mychart (1st Attempt) for the patient to call back and schedule the following:    Appointment type: [facility] RETURN  Provider: PCP  Return date: soonest / at pt convenience  Specialty phone number: [phone]    Additonal Notes: overdue 6 mo followup

## 2024-08-19 NOTE — TELEPHONE ENCOUNTER
"Oral Chemotherapy Monitoring Program    Subjective/Objective:  Frandy Workman is a 59 year old male contacted by phone for a follow-up visit for oral chemotherapy.  Miller reports that he is still taking his sorafenib (Nexavar) 1 tablet (200 mg) twice a day on an empty stomach 1 hour before or 2 hours after a meal. He has not missed a dose in the past 2 weeks and is adherent. He has restarted the supplement Neuriva recently. He wanted to clear up some confusion about what \"empty stomach\" means and how these time frames work. He says he struggles with keeping track on this during his evening dose as sometimes he will have a snack after dinner and forget that he either took or needed to take his sorafenib. He reports diarrhea, fatigue, hand-foot syndrome, abnormal bruising, and abdominal pain. His diarrhea has been problematic in the mornings as he has been having to wake around 5 am for the past 5 days to go to the bathroom. He has been taking his max dosage of 8 capsules of loperamide daily during this time but has specified that he will usually only take 16-20 loperamide on a typical week. By the afternoon it is regulated and he does not experience loose stools until the following morning. His fatigue has been bothersome and has fallen behind on his typical daily structure. He said that before he came down with a viral infection in May he would walk 12,000 steps a day but has only walked 9,000 total in the past 10 days. When asked about rash, itchiness, peeling, or cracking on the hands and feet, he said that this has primarily been occurring on his feet, however this may be related to his diabetes. He has been having more frequent bruising that has taken longer to heal. He also described a rash on his forearms but after describing sounds petechiae-like rather than a rash. His abdominal pain is mild and he rarely notices it. He denies nausea, vomiting, constipation, appetite changes, headache, fever, SOB, dizziness, " or abnormal bleeding. [facility] and Dr. Villarreal are aware of his bruising and diarrhea.        6/5/2024     8:00 AM 6/11/2024     8:00 AM 7/3/2024     9:00 AM 7/5/2024     9:00 AM 7/15/2024     1:00 PM 8/6/2024     9:00 AM 8/19/2024     3:00 PM   ORAL CHEMOTHERAPY   Assessment Type Lab Monitoring Refill Lab Monitoring Other;Refill Lab Monitoring Refill Monthly Follow up   Diagnosis Code Hepatocellular Cancer Hepatocellular Cancer Hepatocellular Cancer Hepatocellular Cancer Hepatocellular Cancer Hepatocellular Cancer Hepatocellular Cancer   Providers Dr. Cherelle Villarreal   [facility]   Is this patient followed by the [facility] OC team? No No No No No No No   Drug Name Nexavar (sorafenib) Nexavar (sorafenib) Nexavar (sorafenib) Nexavar (sorafenib) Nexavar (sorafenib) Nexavar (sorafenib) Nexavar (sorafenib)   Dose 200 mg 200 mg 200 mg 200 mg 200 mg 200 mg 200 mg   Current Schedule BID BID BID BID BID BID BID   Cycle Details Continuous Continuous Continuous Continuous Continuous Continuous Continuous   Planned next cycle start date       9/10/2024   Doses missed in last 2 weeks       0   Adherence Assessment       Adherent   Adverse Effects No AE identified during assessment      Diarrhea;Fatigue;Palmar-plantar Erythrodysethesia Syndrome;Thrombocytopenia;Other (See Note for Details)   Diarrhea       Grade 2   Pharmacist Intervention(diarrhea)       Yes   Intervention(s)       Patient education   Palmar-plantar Erythrodysethesia syndrome[hand-foot syndrome]       Grade 1   Pharmacist Intervention(Palmar-plantar)       No   Fatigue       Grade 2   Pharmacist Intervention(fatigue)       Yes   Intervention(s)       Patient education   Thrombocytopenia       Grade 2   Pharmacist Intervention(thrombocytopenia)       Yes   Intervention(s)       Patient education;Referral to oncology provider   Other (See Note for  "Details)       abdominal pain   Pharmacist intervention(other)       No   Is the dose as ordered appropriate for the patient? Yes             Last PHQ-2 Score on record:       10/2/2023     1:00 PM 7/24/2023    11:09 AM   PHQ-2 ( 1999 Pf[employer])   Q1: Little interest or pleasure in doing things 1 0   Q2: Feeling down, depressed or hopeless 0 0   PHQ-2 Score 1 0   PHQ-2 Total Score (12-17 Years)- Positive if 3 or more points; Administer PHQ-A if positive 1 0       Vitals:  BP:   BP Readings from Last 1 Encounters:   08/11/24 133/78     Wt Readings from Last 1 Encounters:   08/08/24 72.6 kg (160 lb)     Estimated body surface area is 1.88 meters squared as calculated from the following:    Height as of 8/8/24: 1.753 m (5' 9.02\").    Weight as of 8/8/24: 72.6 kg (160 lb).    Labs:  _  Result Component Current Result Ref Range   Sodium 142 (8/11/2024) 135 - 145 mmol/L     _  Result Component Current Result Ref Range   Potassium 4.2 (8/11/2024) 3.4 - 5.3 mmol/L     _  Result Component Current Result Ref Range   Calcium 8.7 (L) (8/11/2024) 8.8 - 10.4 mg/dL     _  Result Component Current Result Ref Range   Magnesium 1.5 (L) (8/5/2024) 1.7 - 2.3 mg/dL     _  Result Component Current Result Ref Range   Phosphorus 3.4 (8/5/2024) 2.5 - 4.5 mg/dL     _  Result Component Current Result Ref Range   Albumin 4.6 (8/11/2024) 3.5 - 5.2 g/dL     _  Result Component Current Result Ref Range   Urea Nitrogen 23.1 (H) (8/11/2024) 8.0 - 23.0 mg/dL     _  Result Component Current Result Ref Range   Creatinine 1.34 (H) (8/11/2024) 0.67 - 1.17 mg/dL     _  Result Component Current Result Ref Range   AST 31 (8/11/2024) 0 - 45 U/L     _  Result Component Current Result Ref Range   ALT 31 (8/11/2024) 0 - 70 U/L     _  Result Component Current Result Ref Range   Bilirubin Total 0.4 (8/11/2024) <=1.2 mg/dL     _  Result Component Current Result Ref Range   WBC Count 5.6 (8/11/2024) 4.0 - 11.0 10e3/uL     _  Result Component Current Result Ref Range "   Hemoglobin 10.9 (L) (8/11/2024) 13.3 - 17.7 g/dL     _  Result Component Current Result Ref Range   Platelet Count 144 (L) (8/11/2024) 150 - 450 10e3/uL     No results found for ANC within last 30 days.     _  Result Component Current Result Ref Range   Absolute Neutrophils 4.2 (8/11/2024) 1.6 - 8.3 10e3/uL          Assessment/Plan:  For his troubles with his timing of dosing his sorafenib, we discussed ways of keeping track of when he had taken his dose including setting timers and explaining scenarios so he can have a better understanding of how to take his medication. He also asked about the importance of 12 hour separation between doses, which we concluded that timing the dose around meals will be more important than being accurate with  his twice a day dosing into a 12 hour separation.   His diarrhea is concerning as this has been regular for the past 5 days and has been taking 8 capsules of his loperamide daily. After discussion about this, it sounded like Miller was unaware of how to correctly dose loperamide (2 capsules with initial stool followed by 1 capsule for every subsequent stool thereafter) and has been taking 4 doses a day when diarrhea is occurring. His diarrhea ceases in the afternoon but restarts in the morning, suggesting that the loperamide is helping later in the day but he is still struggling in the morning. We will continue to monitor this side effect closely.   His fatigue has been bothersome and preventing his daily tasks some days, which I provided him with some patient education on structuring his day during high and low energy spells.   His HFS symptoms sound to be related to his diabetes more so than his sorafenib, but nevertheless it sounds like it is being maintained at its current state and is not too bothersome for him.  His abnormal bruising was also a cause for concern as his bruises have not been going away and new ones show up without him knowing what may have caused  them. He is taking Eliquis currently and has lower than normal platelet counts, which may be contributing to this side effect. He also reported what he described as a rash but sounded more like popped blood vessels in his forearms. This has occurred about every other month and they go away in 3-5 days. I provided him with some patient education on how to prevent more bruising and steps to take if he does experience bleeding. He was provided with the patient Nurse Triage phone line in case there is an issue that does occur. He reported that he is still using a razor blade for shaving, which I recommended switching to an electric razor to prevent accidental nicks.  His abdominal pain is not a cause for concern and has been described as mild. He described this a manageable and did not need any recommendations at this time.  Miller was contacted via [employer] following the monthly assessment with further clarification about dosing his sorafenib, loperamide, and that we will follow-up with him prior to his appointment to check if there have been any changes to his loose stool frequency.  Follow-Up:  Next labs: 9/5  Next appt with Dr. Villarreal 9/9    Refill Due:  9/10    Ovi Bucio  Pharmacy Intern  Oral Chemotherapy Monitoring Program  [facility]  [phone]

## 2024-08-22 ENCOUNTER — OFFICE VISIT (OUTPATIENT)
Dept: DERMATOLOGY | Facility: CLINIC | Age: 59
End: 2024-08-22
Payer: MEDICARE

## 2024-08-22 DIAGNOSIS — L57.0 AK (ACTINIC KERATOSIS): Primary | ICD-10-CM

## 2024-08-22 DIAGNOSIS — Z85.828 HISTORY OF NONMELANOMA SKIN CANCER: ICD-10-CM

## 2024-08-22 DIAGNOSIS — D49.2 NEOPLASM OF UNSPECIFIED BEHAVIOR OF BONE, SOFT TISSUE, AND SKIN: ICD-10-CM

## 2024-08-22 PROCEDURE — 88342 IMHCHEM/IMCYTCHM 1ST ANTB: CPT | Mod: 26 | Performed by: DERMATOLOGY

## 2024-08-22 PROCEDURE — 88305 TISSUE EXAM BY PATHOLOGIST: CPT | Mod: 26 | Performed by: DERMATOLOGY

## 2024-08-22 PROCEDURE — 88312 SPECIAL STAINS GROUP 1: CPT | Mod: TC | Performed by: DERMATOLOGY

## 2024-08-22 ASSESSMENT — PAIN SCALES - GENERAL: PAINLEVEL: NO PAIN (0)

## 2024-08-22 NOTE — LETTER
8/22/2024       RE: Frandy Workman  [address] [facility] [address]     Dear Colleague,    Thank you for referring your patient, Frandy Workman, to the [facility] DERMATOLOG[facility] [place] at [facility]. Please see a copy of my visit note below.    [facility] Dermatology Note    Encounter Date: Aug 22, 2024    Dermatology Problem List:  # Hx transplant (liver, 11/11/2019), on IMSP (mycophenolate 500mg BID, prednisone 5mg daily)   # Hx of NMSC   - SCCis, Left temporal scalp, s/p MMS 2/15/23   # Folliculitis. BPO wash  # Versicolor. Ketoconazole wash  # NUB, x4: right cutaneous upper lip, right medial cheek, left eyebrow, left lateral cheek. DDx: r/o BCC vs seborrheic hyperplasia vs folliculitis. 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 Pt denied trauma to the area. Concern for metastasis, though DDx also includes fat necrosis 2/2 unrecognized trauma. Reviewed CT scan from 08/05/2024 with no comment from radiologist, raising suspicion for an acute finding today (08/22/2024).  Will perform punch biopsy.  -Punch Biopsy 08/22/2024    #Actinic Keratoses, erythematous plaque with gritty texture and scale to left forearm, erythematous plaque with shaving trauma to the left cheek, and erythematous plaque to the right upper forehead above the brow.   -Cryotherapy     #Benign skin lesions, scattered hyperkeratotic papules that appear stuck on (Sks), hyperpigmented patches (solar lentinges), vascular appearing papules  (cherry angiomas), and pigmented papules (nevi).   -Encouraged sun protective behaviors and sunscreen use     # History of NMSC. Clinically no evidence of recurrence today.  - Reassurance      Cryotherapy procedure note:   After verbal consent and discussion of risks and benefits including but no limited to dyspigmentation/scar, blister, and pain, 3 lesions of left cheek, left forearm, right forehead above brow was(were) treated with 1-2mm freeze border for 2 cycles with liquid nitrogen. Post cryotherapy instructions were provided.   Biopsy procedure note:  After discussion of benefits and risks including but not limited to bleeding, infection, scar, incomplete removal, recurrence, and non-diagnostic biopsy, written consent and photographs were obtained. The area was cleaned with isopropyl alcohol. 1% lidocaine with epinephrine was injected to obtain adequate anesthesia of the lesion on the right chest. A punch biopsy was performed. 4-0 Prolene sutures were utilized to approximate the epidermal edges. White petrolatum ointment and a bandage was applied to the wound. Explicit verbal and written wound care instructions were provided. The patient left the dermatology clinic in good condition.    Follow-up in pending biopsy responses and for annual FBSE.       Staff Involved:  Mohamud Petty II, MPH  Medical Student Progress Note    Seen and staffed with Dr. Coy MD    I was present with the medical student who participated in the service and in the documentation.  I have verified the history and personally performed the physical exam and medical decision making.  I agree with the assessment and plan of care as documented in the note.  I personally performed all procedures.    Americo Cespedes MD  Dermatology Attending      CC:   Chief Complaint   Patient presents with     Skin Check     FBSE. No new spots of concern.       History of Present Illness:  Mr. Frandy Workman is a 59 year old male who presents for FBSE  in setting of liver transplant, recurrent hepatocellular carcinoma with peritoneal mets. He has no specific skin concerns today. He states that he is protective of his skin while out in the sun with sunscreen and protective clothing. He does note bruising of the left flexural crease from infusion along with bruising of the chest. His chest has a palpable mass that the patient has been unaware of that is near site of bruising. He reports no known trauma to the area.     Labs/Imaginin2024 CT Chest/Abdomen:   IMPRESSION:   1.  Postsurgical changes of prior liver transplantation with no  evidence of HCC.  2.  Increased size of a peritoneal nodule adjacent to the hepatic  flexure colon, measuring 10 mm, previously measuring 5 mm on 2024  and 2024. Recommend attention on follow up.  3.  Stable 7 mm hypodensity within the pancreatic head, possibly  representing an IPMN.  4.  Unchanged gastroesophageal and splenorenal varices.     I have personally reviewed the examination and initial interpretation  and I agree with the findings.    Physical exam:  Vitals: There were no vitals taken for this visit.  GEN: well developed, well-nourished, in no acute distress, in a pleasant mood.     SKIN:   -Erythematous plaque with gritty texture and scale to left forearm, erythematous plaque with shaving trauma to the left cheek, and erythematous plaque to the right upper forehead above the brow.   -Bruising of the left flexural region and right chest   -Scattered hyperkeratotic papules that appear stuck on (Sks), hyperpigmented patches (solar lentinges), vascular appearing papules (cherry angiomas), and pigmented papules (nevi).   MSK:   -Palpable mass in area of bruising of the right chest as shown       Past Medical History:   Past Medical History:   Diagnosis Date     Anemia 2013     Arthritis      BPH (benign prostatic hyperplasia)      CAD (coronary artery disease) 2019     Cholelithiasis      Conductive  hearing loss 08/16/2017     Depressive disorder 1986    Suffer effects throughout life     Gastroesophageal reflux disease 12/01/2014     HCC (hepatocellular carcinoma) (H) 01/22/2019     History of blood transfusion 2019    Had blood transfussion until Dec 2022     History of diabetic retinopathy 07/2018     HTN (hypertension) 11/20/2019     Hyperlipidemia      Liver cirrhosis secondary to ESTRADA (H)      Liver transplanted (H) 11/11/2019     Portal vein thrombosis 04/11/2019     SCC (squamous cell carcinoma) 02/15/2023    02/15/23:  Left temporal scalp     Type II diabetes mellitus (H)      Past Surgical History:   Procedure Laterality Date     ABDOMEN SURGERY  11/11/2019    Had Liver Transplant     BIOPSY  12/2022    Had biopsy done will have additional procedure 2/15/2023     BYPASS GRAFT ARTERY CORONARY N/A 07/14/2021    Procedure: median sternotomy, on cardiopulmonary bypass, CORONARY ARTERY BYPASS GRAFT (CABG) x2 with left greater saphenous vein endoscopic harvest and left internal mammery artery harvest;  Surgeon: Tom Zapata MD;  Location: [facility] OR     CARDIAC SURGERY  7/2021    Heart Graph. and bypass surgery     CHOLECYSTECTOMY  11/11/2022    Removed with Liver Transplant     COLONOSCOPY      2015     COLONOSCOPY N/A 12/06/2019    Procedure: COLONOSCOPY, WITH POLYPECTOMY AND BIOPSY;  Surgeon: Adam Morton MD;  Location:  GI     CV CENTRAL VENOUS CATHETER PLACEMENT N/A 07/12/2021    Procedure: Central Venous Catheter Placement;  Surgeon: Fermin Polanco MD;  Location: [facility] CARDIAC CATH LAB     CV CORONARY ANGIOGRAM N/A 07/12/2021    Procedure: Coronary Angiogram;  Surgeon: Fermin Polanco MD;  Location:  HEART CARDIAC CATH LAB     CV HEART CATHETERIZATION WITH POSSIBLE INTERVENTION N/A 02/26/2019    Procedure: CORS;  Surgeon: Jagdish Hoyt MD;  Location:  HEART CARDIAC CATH LAB     CV INTRA AORTIC BALLOON N/A 07/12/2021    Procedure: Intra Aortic  Balloon Pump Insertion;  Surgeon: Fermin Polanco MD;  Location:  HEART CARDIAC CATH LAB     ESOPHAGOSCOPY, GASTROSCOPY, DUODENOSCOPY (EGD), COMBINED N/A 11/17/2016    Procedure: COMBINED ESOPHAGOSCOPY, GASTROSCOPY, DUODENOSCOPY (EGD);  Surgeon: Santi Rosas MD;  Location:  GI     ESOPHAGOSCOPY, GASTROSCOPY, DUODENOSCOPY (EGD), COMBINED N/A 11/17/2017    Procedure: COMBINED ESOPHAGOSCOPY, GASTROSCOPY, DUODENOSCOPY (EGD);  EGD;  Surgeon: Santi Rosas MD;  Location:  GI     ESOPHAGOSCOPY, GASTROSCOPY, DUODENOSCOPY (EGD), COMBINED N/A 12/28/2018    Procedure: EGD;  Surgeon: Santi Rosas MD;  Location:  OR     ESOPHAGOSCOPY, GASTROSCOPY, DUODENOSCOPY (EGD), COMBINED N/A 12/06/2019    Procedure: ESOPHAGOGASTRODUODENOSCOPY, WITH BIOPSY;  Surgeon: Adam Morton MD;  Location:  GI     ESOPHAGOSCOPY, GASTROSCOPY, DUODENOSCOPY (EGD), COMBINED N/A 02/13/2020    Procedure: ESOPHAGOGASTRODUODENOSCOPY (EGD);  Surgeon: Santi Rosas MD;  Location:  GI     EXCISE MASS ABDOMEN N/A 07/13/2023    Procedure: Laparoscopic lysis of adhesions, laparoscopic resection of abdominal mass;  Surgeon: Ruiz Chu MD;  Location:  OR     HEAD & NECK SURGERY      12/2017 at [facility].      IMPLANT GOLD WEIGHT EYELID Right 11/16/2017    Procedure: IMPLANT WEIGHT EYELID;  Right Upper Eyelid Weight, right tarsal strip lower eyelid;  Surgeon: Milana Malave MD;  Location: [facility] OR     IR CHEMO EMBOLIZATION  01/22/2019     KNEE SURGERY Left      ORTHOPEDIC SURGERY       PAROTIDECTOMY, RADICAL NECK DISSECTION Right 11/02/2017    Procedure: PAROTIDECTOMY, RADICAL NECK DISSECTION;  Right Superfacial Parotidectomy , Facial nerve repair. with [facility] facial nerve monitor.;  Surgeon: Asiya Morgan MD;  [facility]     PHACOEMULSIFICATION CLEAR CORNEA WITH STANDARD INTRAOCULAR LENS IMPLANT Right 01/24/2023    Procedure: PHACOEMULSIFICATION, COMPLEX CATARACT, WITH INTRAOCULAR  LENS IMPLANT WITH TRYPAN RIGHT EYE;  Surgeon: Enriqueta Martin MD;  Location: [facility] OR     PHACOEMULSIFICATION WITH STANDARD INTRAOCULAR LENS IMPLANT Left 2023    Procedure: PHACOEMULSIFICATION, COMPLEX CATARACT, WITH STANDARD INTRAOCULAR LENS IMPLANT INSERTION LEFT EYE;  Surgeon: Enriqueta Martin MD;  Location: [facility] OR     PICC INSERTION Left 2017    4fr SL BioFlo PICC, 44cm in the L basilic vein w/ tip in the low SVC     RETURN LIVER TRANSPLANT N/A 2019    Procedure: Exploratory laparotomy, hematoma evacuation, abdominal washout;  Surgeon: Александр Ramos MD;  Location: [facility] OR     TRANSPLANT LIVER RECIPIENT  DONOR N/A 2019    Procedure: TRANSPLANT, LIVER, RECIPIENT,  DONOR;  Surgeon: Александр Ramos MD;  Location: [facility] OR     VASCULAR SURGERY         Social History:   reports that he has quit smoking. His smoking use included dip, chew, snus or snuff. He has been exposed to tobacco smoke. He quit smokeless tobacco use about 6 years ago.  His smokeless tobacco use included chew. He reports that he does not drink alcohol and does not use drugs.    Family History:  Family History   Problem Relation Age of Onset     Skin Cancer Mother      Cancer Mother         mastectomy     Diabetes Mother          3/2016     Cerebrovascular Disease Mother         Passed away in Feb of this year, 80 years old.     Thyroid Disease Mother      Depression Mother      Breast Cancer Mother      Obesity Mother         280 pounds  from this and complications from D     Pancreatic Cancer Father 60     Prostate Cancer Father         Currently in Remission     Macular Degeneration Father      Glaucoma Father      Skin Cancer Father      Coronary Artery Disease Father         Started in his 70's  at 88 [place]      Colon Cancer Father      Thyroid Disease Sister      Depression Sister      Asthma Sister      Sleep Apnea Brother      Colorectal Cancer Maternal  Grandmother      Cancer Maternal Grandmother      Substance Abuse Maternal Grandmother         Alcohol     Prostate Cancer Maternal Grandfather      Substance Abuse Maternal Grandfather         Alcohol     Colorectal Cancer Paternal Grandmother      Asthma Sister         Had since birth     Liver Disease No family hx of      Melanoma No family hx of      Anesthesia Reaction No family hx of      Deep Vein Thrombosis (DVT) No family hx of        Medications:  Current Outpatient Medications   Medication Sig Dispense Refill     acetaminophen (TYLENOL) 325 MG tablet TAKE 1 TABLET BY MOUTH EVERY SIX HOURS AS NEEDED FOR MILD PAIN 100 tablet 3     albuterol (PROAIR HFA/PROVENTIL HFA/VENTOLIN HFA) 108 (90 Base) MCG/ACT inhaler Inhale 2 puffs into the lungs every 4 hours as needed for shortness of breath, wheezing or cough 18 g 1     ammonium lactate (AMLACTIN) 12 % external cream APPLY TOPICALLY DAILY AS NEEDED FOR DRY SKIN (ON FEET) 140 g 5     amoxicillin-clavulanate (AUGMENTIN) 875-125 MG tablet Take 1 tablet by mouth 2 times daily 20 tablet 0     benzoyl peroxide 5 % external liquid Use topically in showers as a body wash 226 g 9     blood glucose (NO BRAND SPECIFIED) lancets standard Use to test blood sugar 1 times daily or as directed. 100 each 11     Continuous Glucose Sensor (FREESTYLE CLAUDIA 2 SENSOR) MISC 1 each See Admin Instructions Change every 14 days. 7 each 3     ELIQUIS ANTICOAGULANT 5 MG tablet TAKE 1 TABLET BY MOUTH TWICE A DAY 60 tablet 3     empagliflozin (JARDIANCE) 10 MG TABS tablet Take 1 tablet (10 mg) by mouth daily 30 tablet 11     insulin aspart (FIASP FLEXTOUCH) 100 UNIT/ML pen-injector Inject 5-10 Units Subcutaneous 4 times daily (with meals and nightly) 1unit : 8 g carb before meals. Also add 1 unit : 50 mg/dl >180 before meals and at bedtime. Approx 45 units daily. 45 mL 3     insulin aspart (NOVOLOG FLEXPEN) 100 UNIT/ML pen INJECT 5-10U SUBCUTANEOUSLY FOUR TIMES A DAY ( WITH MEALS AND EVERY  NIGHT ) ONE UNIT :8CARB BEFORE MEALS . ALSO ADD ONE UNIT : 50MG/DL 15 mL 11     insulin degludec (TRESIBA FLEXTOUCH) 100 UNIT/ML pen Inject 15 units subcutaneous once daily 45 mL 3     insulin pen needle (ULTICARE MICRO) 32G X 4 MM miscellaneous USE 5 PER  each 3     K-Phos-Neutral 155-852-130 MG tablet Take 1 tablet by mouth 4 times daily 120 tablet 1     ketoconazole (NIZORAL) 2 % external shampoo Apply thin layer topically to scalp in shower (leave on 5 min prior to rinse); may also use as a body wash 120 mL 11     lamiVUDine (EPIVIR) 100 MG tablet TAKE 1 TABLET BY MOUTH ONCE DAILY 90 tablet 2     lisinopril (ZESTRIL) 5 MG tablet Take 1 tablet (5 mg) by mouth daily 90 tablet 3     loperamide (IMODIUM) 2 MG capsule Take 1 capsule (2 mg) by mouth 4 times daily as needed for diarrhea 120 capsule 3     magnesium oxide 400 MG tablet TAKE 1 TABLET BY MOUTH DAILY 30 tablet 5     metoprolol succinate ER (TOPROL XL) 25 MG 24 hr tablet TAKE 1 TABLET BY MOUTH DAILY 30 tablet 3     Multiple Vitamin (DAILY-GLADIS MULTIVITAMIN) TABS TAKE 1 TABLET BY MOUTH ONCE DAILY 30 tablet 11     mycophenolate (GENERIC EQUIVALENT) 250 MG capsule TAKE TWO CAPSULES BY MOUTH EVERY 12 HOURS 120 capsule 11     NIFEdipine ER OSMOTIC (PROCARDIA XL) 60 MG 24 hr tablet TAKE 1 TABLET(60 MG) BY MOUTH TWICE DAILY 6 tablet 0     omega 3 1000 MG CAPS Take 2,000 mg by mouth 2 times daily 120 capsule 10     ondansetron (ZOFRAN ODT) 8 MG ODT tab Take 1 tablet (8 mg) by mouth every 8 hours as needed for nausea 15 tablet 3     pantoprazole (PROTONIX) 40 MG EC tablet TAKE 1 TABLET BY MOUTH ONCE DAILY BEFORE BREAKFAST 90 tablet 3     predniSONE (DELTASONE) 5 MG tablet TAKE ONE TABLET BY MOUTH ONCE DAILY 30 tablet 11     rosuvastatin (CRESTOR) 20 MG tablet Take 1 tablet (20 mg) by mouth daily 90 tablet 3     sodium zirconium cyclosilicate (LOKELMA) 10 g PACK packet Take 10 gram 3x/day for 2 days then 10 grams daily. (Patient taking differently: Take 10 g by  mouth daily Take 10 gram 3x/day for 2 days then 10 grams daily.) 30 packet 11     SORAfenib (NEXAVAR) 200 MG tablet Take 1 tablet (200 mg) by mouth 2 times daily On an empty stomach 1 hour before or 2 hours after a meal. 60 tablet 0     tamsulosin (FLOMAX) 0.4 MG capsule TAKE 1 CAPSULE BY MOUTH ONCE DAILY 30 capsule 11     UltiCare Alcohol Swabs 70 % PADS 1 each by Other route 4 times daily 400 each 1     vitamin D3 (CHOLECALCIFEROL) 50 mcg (2000 units) tablet TAKE 1 TABLET BY MOUTH ONCE DAILY 30 tablet 8     Allergies   Allergen Reactions     Codeine Other (See Comments)     Cannot take due to liver  Cannot tolerate oral narcotics     Seasonal Allergies      Sneezing, coughing, runny and itchy eyes                 Again, thank you for allowing me to participate in the care of your patient.      Sincerely,    Americo Cespedes MD

## 2024-08-22 NOTE — PROGRESS NOTES
[facility] Dermatology Note    Encounter Date: Aug 22, 2024    Dermatology Problem List:  # Hx transplant (liver, 11/11/2019), on IMSP (mycophenolate 500mg BID, prednisone 5mg daily)   # Hx of NMSC   - SCCis, Left temporal scalp, s/p MMS 2/15/23   # Folliculitis. BPO wash  # Versicolor. Ketoconazole wash  # NUB, x4: right cutaneous upper lip, right medial cheek, left eyebrow, left lateral cheek. DDx: r/o BCC vs seborrheic hyperplasia vs folliculitis. 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 Pt denied trauma to the area. Concern for metastasis, though DDx also includes fat necrosis 2/2 unrecognized trauma. Reviewed CT scan from 08/05/2024 with no comment from radiologist, raising suspicion for an acute finding today (08/22/2024).  Will perform punch biopsy.  -Punch Biopsy 08/22/2024    #Actinic Keratoses, erythematous plaque with gritty texture and scale to left forearm, erythematous plaque with shaving trauma to the left cheek, and erythematous plaque to the right upper forehead above the brow.   -Cryotherapy     #Benign skin lesions, scattered hyperkeratotic papules that appear stuck on (Sks), hyperpigmented patches (solar lentinges), vascular appearing papules (cherry angiomas), and pigmented papules (nevi).   -Encouraged sun protective behaviors and sunscreen use     # History of NMSC. Clinically no evidence of recurrence today.  - Reassurance      Cryotherapy procedure note:   After verbal consent and discussion of risks and benefits including but no limited to  dyspigmentation/scar, blister, and pain, 3 lesions of left cheek, left forearm, right forehead above brow was(were) treated with 1-2mm freeze border for 2 cycles with liquid nitrogen. Post cryotherapy instructions were provided.   Biopsy procedure note:  After discussion of benefits and risks including but not limited to bleeding, infection, scar, incomplete removal, recurrence, and non-diagnostic biopsy, written consent and photographs were obtained. The area was cleaned with isopropyl alcohol. 1% lidocaine with epinephrine was injected to obtain adequate anesthesia of the lesion on the right chest. A punch biopsy was performed. 4-0 Prolene sutures were utilized to approximate the epidermal edges. White petrolatum ointment and a bandage was applied to the wound. Explicit verbal and written wound care instructions were provided. The patient left the dermatology clinic in good condition.    Follow-up in pending biopsy responses and for annual FBSE.       Staff Involved:  Mohamud Petty II, MPH  Medical Student Progress Note    Seen and staffed with Dr. Coy MD    I was present with the medical student who participated in the service and in the documentation.  I have verified the history and personally performed the physical exam and medical decision making.  I agree with the assessment and plan of care as documented in the note.  I personally performed all procedures.    Americo Cespedes MD  Dermatology Attending      CC:   Chief Complaint   Patient presents with    Skin Check     FBSE. No new spots of concern.       History of Present Illness:  Mr. Frandy Workman is a 59 year old male who presents for FBSE in setting of liver transplant, recurrent hepatocellular carcinoma with peritoneal mets. He has no specific skin concerns today. He states that he is protective of his skin while out in the sun with sunscreen and protective clothing. He does note bruising of the left flexural crease from infusion along with  bruising of the chest. His chest has a palpable mass that the patient has been unaware of that is near site of bruising. He reports no known trauma to the area.     Labs/Imaginin2024 CT Chest/Abdomen:   IMPRESSION:   1.  Postsurgical changes of prior liver transplantation with no  evidence of HCC.  2.  Increased size of a peritoneal nodule adjacent to the hepatic  flexure colon, measuring 10 mm, previously measuring 5 mm on 2024  and 2024. Recommend attention on follow up.  3.  Stable 7 mm hypodensity within the pancreatic head, possibly  representing an IPMN.  4.  Unchanged gastroesophageal and splenorenal varices.     I have personally reviewed the examination and initial interpretation  and I agree with the findings.    Physical exam:  Vitals: There were no vitals taken for this visit.  GEN: well developed, well-nourished, in no acute distress, in a pleasant mood.     SKIN:   -Erythematous plaque with gritty texture and scale to left forearm, erythematous plaque with shaving trauma to the left cheek, and erythematous plaque to the right upper forehead above the brow.   -Bruising of the left flexural region and right chest   -Scattered hyperkeratotic papules that appear stuck on (Sks), hyperpigmented patches (solar lentinges), vascular appearing papules (cherry angiomas), and pigmented papules (nevi).   MSK:   -Palpable mass in area of bruising of the right chest as shown       Past Medical History:   Past Medical History:   Diagnosis Date    Anemia     Arthritis     BPH (benign prostatic hyperplasia)     CAD (coronary artery disease) 2019    Cholelithiasis     Conductive hearing loss 2017    Depressive disorder 1986    Suffer effects throughout life    Gastroesophageal reflux disease 2014    HCC (hepatocellular carcinoma) (H) 2019    History of blood transfusion     Had blood transfussion until Dec 2022    History of diabetic retinopathy 2018    HTN  (hypertension) 11/20/2019    Hyperlipidemia     Liver cirrhosis secondary to ESTRADA (H)     Liver transplanted (H) 11/11/2019    Portal vein thrombosis 04/11/2019    SCC (squamous cell carcinoma) 02/15/2023    02/15/23:  Left temporal scalp    Type II diabetes mellitus (H)      Past Surgical History:   Procedure Laterality Date    ABDOMEN SURGERY  11/11/2019    Had Liver Transplant    BIOPSY  12/2022    Had biopsy done will have additional procedure 2/15/2023    BYPASS GRAFT ARTERY CORONARY N/A 07/14/2021    Procedure: median sternotomy, on cardiopulmonary bypass, CORONARY ARTERY BYPASS GRAFT (CABG) x2 with left greater saphenous vein endoscopic harvest and left internal mammery artery harvest;  Surgeon: Tom Zapata MD;  Location: [facility] OR    CARDIAC SURGERY  7/2021    Heart Graph. and bypass surgery    CHOLECYSTECTOMY  11/11/2022    Removed with Liver Transplant    COLONOSCOPY      2015    COLONOSCOPY N/A 12/06/2019    Procedure: COLONOSCOPY, WITH POLYPECTOMY AND BIOPSY;  Surgeon: Adam Morton MD;  Location:  GI    CV CENTRAL VENOUS CATHETER PLACEMENT N/A 07/12/2021    Procedure: Central Venous Catheter Placement;  Surgeon: Fermin Polanco MD;  Location:  HEART CARDIAC CATH LAB    CV CORONARY ANGIOGRAM N/A 07/12/2021    Procedure: Coronary Angiogram;  Surgeon: Fermin Polanco MD;  Location: [facility] CARDIAC CATH LAB    CV HEART CATHETERIZATION WITH POSSIBLE INTERVENTION N/A 02/26/2019    Procedure: CORS;  Surgeon: Jagdish Hoyt MD;  Location: [facility] CARDIAC CATH LAB    CV INTRA AORTIC BALLOON N/A 07/12/2021    Procedure: Intra Aortic Balloon Pump Insertion;  Surgeon: Fermin Polanco MD;  Location:  HEART CARDIAC CATH LAB    ESOPHAGOSCOPY, GASTROSCOPY, DUODENOSCOPY (EGD), COMBINED N/A 11/17/2016    Procedure: COMBINED ESOPHAGOSCOPY, GASTROSCOPY, DUODENOSCOPY (EGD);  Surgeon: Santi Rosas MD;  Location:  GI    ESOPHAGOSCOPY,  GASTROSCOPY, DUODENOSCOPY (EGD), COMBINED N/A 11/17/2017    Procedure: COMBINED ESOPHAGOSCOPY, GASTROSCOPY, DUODENOSCOPY (EGD);  EGD;  Surgeon: Santi Rosas MD;  Location:  GI    ESOPHAGOSCOPY, GASTROSCOPY, DUODENOSCOPY (EGD), COMBINED N/A 12/28/2018    Procedure: EGD;  Surgeon: Santi Rosas MD;  Location:  OR    ESOPHAGOSCOPY, GASTROSCOPY, DUODENOSCOPY (EGD), COMBINED N/A 12/06/2019    Procedure: ESOPHAGOGASTRODUODENOSCOPY, WITH BIOPSY;  Surgeon: Adam Morton MD;  Location:  GI    ESOPHAGOSCOPY, GASTROSCOPY, DUODENOSCOPY (EGD), COMBINED N/A 02/13/2020    Procedure: ESOPHAGOGASTRODUODENOSCOPY (EGD);  Surgeon: Santi Rosas MD;  Location:  GI    EXCISE MASS ABDOMEN N/A 07/13/2023    Procedure: Laparoscopic lysis of adhesions, laparoscopic resection of abdominal mass;  Surgeon: Ruiz Chu MD;  Location:  OR    HEAD & NECK SURGERY      12/2017 at [facility].     IMPLANT GOLD WEIGHT EYELID Right 11/16/2017    Procedure: IMPLANT WEIGHT EYELID;  Right Upper Eyelid Weight, right tarsal strip lower eyelid;  Surgeon: Milana Malave MD;  Location:  OR    IR CHEMO EMBOLIZATION  01/22/2019    KNEE SURGERY Left     ORTHOPEDIC SURGERY      PAROTIDECTOMY, RADICAL NECK DISSECTION Right 11/02/2017    Procedure: PAROTIDECTOMY, RADICAL NECK DISSECTION;  Right Superfacial Parotidectomy , Facial nerve repair. with [facility] facial nerve monitor.;  Surgeon: Asiya Morgan MD;  Location:  OR    PHACOEMULSIFICATION CLEAR CORNEA WITH STANDARD INTRAOCULAR LENS IMPLANT Right 01/24/2023    Procedure: PHACOEMULSIFICATION, COMPLEX CATARACT, WITH INTRAOCULAR LENS IMPLANT WITH TRYPAN RIGHT EYE;  Surgeon: Enriqueta Martin MD;  Location: [facility] OR    PHACOEMULSIFICATION WITH STANDARD INTRAOCULAR LENS IMPLANT Left 01/03/2023    Procedure: PHACOEMULSIFICATION, COMPLEX CATARACT, WITH STANDARD INTRAOCULAR LENS IMPLANT INSERTION LEFT EYE;  Surgeon: Enriqueta Martin MD;  Location: [facility]  OR    PICC INSERTION Left 2017    4fr SL BioFlo PICC, 44cm in the L basilic vein w/ tip in the low SVC    RETURN LIVER TRANSPLANT N/A 2019    Procedure: Exploratory laparotomy, hematoma evacuation, abdominal washout;  Surgeon: Александр Ramos MD;  Location: [facility] OR    TRANSPLANT LIVER RECIPIENT  DONOR N/A 2019    Procedure: TRANSPLANT, LIVER, RECIPIENT,  DONOR;  Surgeon: Александр Ramos MD;  Location: [facility] OR    VASCULAR SURGERY         Social History:   reports that he has quit smoking. His smoking use included dip, chew, snus or snuff. He has been exposed to tobacco smoke. He quit smokeless tobacco use about 6 years ago.  His smokeless tobacco use included chew. He reports that he does not drink alcohol and does not use drugs.    Family History:  Family History   Problem Relation Age of Onset    Skin Cancer Mother     Cancer Mother         mastectomy    Diabetes Mother          3/2016    Cerebrovascular Disease Mother         Passed away in Feb of this year, 80 years old.    Thyroid Disease Mother     Depression Mother     Breast Cancer Mother     Obesity Mother         280 pounds  from this and complications from D    Pancreatic Cancer Father 60    Prostate Cancer Father         Currently in Remission    Macular Degeneration Father     Glaucoma Father     Skin Cancer Father     Coronary Artery Disease Father         Started in his 70's  at 88 in Octobe2023    Colon Cancer Father     Thyroid Disease Sister     Depression Sister     Asthma Sister     Sleep Apnea Brother     Colorectal Cancer Maternal Grandmother     Cancer Maternal Grandmother     Substance Abuse Maternal Grandmother         Alcohol    Prostate Cancer Maternal Grandfather     Substance Abuse Maternal Grandfather         Alcohol    Colorectal Cancer Paternal Grandmother     Asthma Sister         Had since birth    Liver Disease No family hx of     Melanoma No family hx of     Anesthesia Reaction No  family hx of     Deep Vein Thrombosis (DVT) No family hx of        Medications:  Current Outpatient Medications   Medication Sig Dispense Refill    acetaminophen (TYLENOL) 325 MG tablet TAKE 1 TABLET BY MOUTH EVERY SIX HOURS AS NEEDED FOR MILD PAIN 100 tablet 3    albuterol (PROAIR HFA/PROVENTIL HFA/VENTOLIN HFA) 108 (90 Base) MCG/ACT inhaler Inhale 2 puffs into the lungs every 4 hours as needed for shortness of breath, wheezing or cough 18 g 1    ammonium lactate (AMLACTIN) 12 % external cream APPLY TOPICALLY DAILY AS NEEDED FOR DRY SKIN (ON FEET) 140 g 5    amoxicillin-clavulanate (AUGMENTIN) 875-125 MG tablet Take 1 tablet by mouth 2 times daily 20 tablet 0    benzoyl peroxide 5 % external liquid Use topically in showers as a body wash 226 g 9    blood glucose (NO BRAND SPECIFIED) lancets standard Use to test blood sugar 1 times daily or as directed. 100 each 11    Continuous Glucose Sensor (FREESTYLE CLAUDIA 2 SENSOR) MISC 1 each See Admin Instructions Change every 14 days. 7 each 3    ELIQUIS ANTICOAGULANT 5 MG tablet TAKE 1 TABLET BY MOUTH TWICE A DAY 60 tablet 3    empagliflozin (JARDIANCE) 10 MG TABS tablet Take 1 tablet (10 mg) by mouth daily 30 tablet 11    insulin aspart (FIASP FLEXTOUCH) 100 UNIT/ML pen-injector Inject 5-10 Units Subcutaneous 4 times daily (with meals and nightly) 1unit : 8 g carb before meals. Also add 1 unit : 50 mg/dl >180 before meals and at bedtime. Approx 45 units daily. 45 mL 3    insulin aspart (NOVOLOG FLEXPEN) 100 UNIT/ML pen INJECT 5-10U SUBCUTANEOUSLY FOUR TIMES A DAY ( WITH MEALS AND EVERY NIGHT ) ONE UNIT :8CARB BEFORE MEALS . ALSO ADD ONE UNIT : 50MG/DL 15 mL 11    insulin degludec (TRESIBA FLEXTOUCH) 100 UNIT/ML pen Inject 15 units subcutaneous once daily 45 mL 3    insulin pen needle (ULTICARE MICRO) 32G X 4 MM miscellaneous USE 5 PER  each 3    K-Phos-Neutral 155-852-130 MG tablet Take 1 tablet by mouth 4 times daily 120 tablet 1    ketoconazole (NIZORAL) 2 %  external shampoo Apply thin layer topically to scalp in shower (leave on 5 min prior to rinse); may also use as a body wash 120 mL 11    lamiVUDine (EPIVIR) 100 MG tablet TAKE 1 TABLET BY MOUTH ONCE DAILY 90 tablet 2    lisinopril (ZESTRIL) 5 MG tablet Take 1 tablet (5 mg) by mouth daily 90 tablet 3    loperamide (IMODIUM) 2 MG capsule Take 1 capsule (2 mg) by mouth 4 times daily as needed for diarrhea 120 capsule 3    magnesium oxide 400 MG tablet TAKE 1 TABLET BY MOUTH DAILY 30 tablet 5    metoprolol succinate ER (TOPROL XL) 25 MG 24 hr tablet TAKE 1 TABLET BY MOUTH DAILY 30 tablet 3    Multiple Vitamin (DAILY-GLADIS MULTIVITAMIN) TABS TAKE 1 TABLET BY MOUTH ONCE DAILY 30 tablet 11    mycophenolate (GENERIC EQUIVALENT) 250 MG capsule TAKE TWO CAPSULES BY MOUTH EVERY 12 HOURS 120 capsule 11    NIFEdipine ER OSMOTIC (PROCARDIA XL) 60 MG 24 hr tablet TAKE 1 TABLET(60 MG) BY MOUTH TWICE DAILY 6 tablet 0    omega 3 1000 MG CAPS Take 2,000 mg by mouth 2 times daily 120 capsule 10    ondansetron (ZOFRAN ODT) 8 MG ODT tab Take 1 tablet (8 mg) by mouth every 8 hours as needed for nausea 15 tablet 3    pantoprazole (PROTONIX) 40 MG EC tablet TAKE 1 TABLET BY MOUTH ONCE DAILY BEFORE BREAKFAST 90 tablet 3    predniSONE (DELTASONE) 5 MG tablet TAKE ONE TABLET BY MOUTH ONCE DAILY 30 tablet 11    rosuvastatin (CRESTOR) 20 MG tablet Take 1 tablet (20 mg) by mouth daily 90 tablet 3    sodium zirconium cyclosilicate (LOKELMA) 10 g PACK packet Take 10 gram 3x/day for 2 days then 10 grams daily. (Patient taking differently: Take 10 g by mouth daily Take 10 gram 3x/day for 2 days then 10 grams daily.) 30 packet 11    SORAfenib (NEXAVAR) 200 MG tablet Take 1 tablet (200 mg) by mouth 2 times daily On an empty stomach 1 hour before or 2 hours after a meal. 60 tablet 0    tamsulosin (FLOMAX) 0.4 MG capsule TAKE 1 CAPSULE BY MOUTH ONCE DAILY 30 capsule 11    UltiCare Alcohol Swabs 70 % PADS 1 each by Other route 4 times daily 400 each 1     vitamin D3 (CHOLECALCIFEROL) 50 mcg (2000 units) tablet TAKE 1 TABLET BY MOUTH ONCE DAILY 30 tablet 8     Allergies   Allergen Reactions    Codeine Other (See Comments)     Cannot take due to liver  Cannot tolerate oral narcotics    Seasonal Allergies      Sneezing, coughing, runny and itchy eyes

## 2024-08-22 NOTE — NURSING NOTE
Dermatology Rooming Note    Frandy Workman's goals for this visit include:   Chief Complaint   Patient presents with    Skin Check     FBSE. No new spots of concern.     Linden ELENA CMA

## 2024-08-26 ENCOUNTER — TELEPHONE (OUTPATIENT)
Dept: ONCOLOGY | Facility: CLINIC | Age: 59
End: 2024-08-26
Payer: MEDICARE

## 2024-08-26 NOTE — ORAL ONC MGMT
"Oral Chemotherapy Monitoring Program     Frandy Workman called in follow up of sorafenib oral chemotherapy.     I called Miller to check in on diarrhea that was reported during 8/19 call. Miller states he had diarrhea through Sat 8/24. He voiced he hasn't been paying attention to his diet-  he has now switched back to his \"kidney diet\" and is feeling better yesterday/today. He is still using loperamide (1 so far today). I reinforced max dose of loperamide and to reach out if it doesn't seem to be helping. We also reviewed his dose timing that was discussed on 8/19 call and he reports he is now spacing doses/meals appropriately.    Miller reports his bruising is about the same. He had a visit with Dermatology 8/22 and a biopsy was taken which is still pending results. He states he does not feel bruising is worse and has had no bleeding episodes.    Pt verbalized understanding and agrees to plan. No further questions or concerns at this time, but encouraged pt to call if any were to arise.    Fallon Coello, PharmD  Oral Chemotherapy Monitoring Program  [facility]  [phone]  August 26, 2024  "

## 2024-08-30 DIAGNOSIS — Z94.4 LIVER TRANSPLANT RECIPIENT (H): ICD-10-CM

## 2024-08-30 DIAGNOSIS — D63.1 ANEMIA OF CHRONIC RENAL FAILURE, STAGE 3B (H): ICD-10-CM

## 2024-08-30 DIAGNOSIS — E11.3293 TYPE 2 DIABETES MELLITUS WITH MILD NONPROLIFERATIVE RETINOPATHY OF BOTH EYES WITHOUT MACULAR EDEMA, UNSPECIFIED WHETHER LONG TERM INSULIN USE (H): ICD-10-CM

## 2024-08-30 DIAGNOSIS — N18.32 STAGE 3B CHRONIC KIDNEY DISEASE (H): ICD-10-CM

## 2024-08-30 DIAGNOSIS — I82.452 ACUTE DEEP VEIN THROMBOSIS (DVT) OF LEFT PERONEAL VEIN (H): ICD-10-CM

## 2024-08-30 DIAGNOSIS — C78.6 MALIGNANT NEOPLASM METASTATIC TO PERITONEUM (H): ICD-10-CM

## 2024-08-30 DIAGNOSIS — C22.0 HCC (HEPATOCELLULAR CARCINOMA) (H): ICD-10-CM

## 2024-08-30 DIAGNOSIS — N18.32 ANEMIA OF CHRONIC RENAL FAILURE, STAGE 3B (H): ICD-10-CM

## 2024-08-30 RX ORDER — LOPERAMIDE HCL 2 MG
CAPSULE ORAL
Qty: 120 CAPSULE | Refills: 1 | Status: SHIPPED | OUTPATIENT
Start: 2024-08-30

## 2024-08-30 NOTE — TELEPHONE ENCOUNTER
Loperamide Refill   Last prescribing provider: DR Villarreal     Last clinic visit date: 8/8/24 Dr Villarreal     Recommendations for requested medication (if none, N/A): Copied from chart note    2. Diarrhea, chronic with acute worsening. The chronic component of this is at least in part due to his therapy, but I could elicit no clear reason for the acute worsening. I refilled his loperamide for him and ask him to pick that up today and start taking the full doses again.     Any other pertinent information (if none, N/A): N/A    Refilled: Y/N, if NO, why?

## 2024-09-02 DIAGNOSIS — C22.0 HCC (HEPATOCELLULAR CARCINOMA) (H): Primary | ICD-10-CM

## 2024-09-02 DIAGNOSIS — C78.6 MALIGNANT NEOPLASM METASTATIC TO PERITONEUM (H): ICD-10-CM

## 2024-09-04 ASSESSMENT — PATIENT HEALTH QUESTIONNAIRE - PHQ9
SUM OF ALL RESPONSES TO PHQ QUESTIONS 1-9: 9
10. IF YOU CHECKED OFF ANY PROBLEMS, HOW DIFFICULT HAVE THESE PROBLEMS MADE IT FOR YOU TO DO YOUR WORK, TAKE CARE OF THINGS AT HOME, OR GET ALONG WITH OTHER PEOPLE: VERY DIFFICULT
SUM OF ALL RESPONSES TO PHQ QUESTIONS 1-9: 9

## 2024-09-05 ENCOUNTER — OFFICE VISIT (OUTPATIENT)
Dept: FAMILY MEDICINE | Facility: CLINIC | Age: 59
End: 2024-09-05
Payer: MEDICARE

## 2024-09-05 ENCOUNTER — ALLIED HEALTH/NURSE VISIT (OUTPATIENT)
Dept: DERMATOLOGY | Facility: CLINIC | Age: 59
End: 2024-09-05
Payer: MEDICARE

## 2024-09-05 ENCOUNTER — TELEPHONE (OUTPATIENT)
Dept: CARDIOLOGY | Facility: CLINIC | Age: 59
End: 2024-09-05

## 2024-09-05 ENCOUNTER — LAB (OUTPATIENT)
Dept: LAB | Facility: CLINIC | Age: 59
End: 2024-09-05
Payer: MEDICARE

## 2024-09-05 ENCOUNTER — ANCILLARY PROCEDURE (OUTPATIENT)
Dept: CT IMAGING | Facility: CLINIC | Age: 59
End: 2024-09-05
Attending: INTERNAL MEDICINE
Payer: MEDICARE

## 2024-09-05 VITALS
OXYGEN SATURATION: 100 % | BODY MASS INDEX: 25.24 KG/M2 | DIASTOLIC BLOOD PRESSURE: 75 MMHG | HEART RATE: 77 BPM | WEIGHT: 171 LBS | SYSTOLIC BLOOD PRESSURE: 135 MMHG

## 2024-09-05 DIAGNOSIS — Z23 ENCOUNTER FOR IMMUNIZATION: ICD-10-CM

## 2024-09-05 DIAGNOSIS — I12.0 HYPERTENSIVE CHRONIC KIDNEY DISEASE WITH STAGE 5 CHRONIC KIDNEY DISEASE OR END STAGE RENAL DISEASE (H): Primary | ICD-10-CM

## 2024-09-05 DIAGNOSIS — Z48.02 VISIT FOR SUTURE REMOVAL: Primary | ICD-10-CM

## 2024-09-05 DIAGNOSIS — N18.32 ANEMIA OF CHRONIC RENAL FAILURE, STAGE 3B (H): ICD-10-CM

## 2024-09-05 DIAGNOSIS — C78.6 MALIGNANT NEOPLASM METASTATIC TO PERITONEUM (H): ICD-10-CM

## 2024-09-05 DIAGNOSIS — Z94.4 LIVER TRANSPLANT RECIPIENT (H): ICD-10-CM

## 2024-09-05 DIAGNOSIS — N18.32 STAGE 3B CHRONIC KIDNEY DISEASE (H): ICD-10-CM

## 2024-09-05 DIAGNOSIS — R19.7 DIARRHEA, UNSPECIFIED TYPE: ICD-10-CM

## 2024-09-05 DIAGNOSIS — E11.3593 PROLIFERATIVE DIABETIC RETINOPATHY OF BOTH EYES ASSOCIATED WITH TYPE 2 DIABETES MELLITUS, UNSPECIFIED PROLIFERATIVE RETINOPATHY TYPE (H): Primary | ICD-10-CM

## 2024-09-05 DIAGNOSIS — E11.3293 TYPE 2 DIABETES MELLITUS WITH MILD NONPROLIFERATIVE RETINOPATHY OF BOTH EYES WITHOUT MACULAR EDEMA, UNSPECIFIED WHETHER LONG TERM INSULIN USE (H): ICD-10-CM

## 2024-09-05 DIAGNOSIS — I82.452 ACUTE DEEP VEIN THROMBOSIS (DVT) OF LEFT PERONEAL VEIN (H): ICD-10-CM

## 2024-09-05 DIAGNOSIS — C22.0 HCC (HEPATOCELLULAR CARCINOMA) (H): ICD-10-CM

## 2024-09-05 DIAGNOSIS — Z12.5 ENCOUNTER FOR SCREENING FOR MALIGNANT NEOPLASM OF PROSTATE: ICD-10-CM

## 2024-09-05 DIAGNOSIS — D63.1 ANEMIA OF CHRONIC RENAL FAILURE, STAGE 3B (H): ICD-10-CM

## 2024-09-05 DIAGNOSIS — Z00.00 HEALTH CARE MAINTENANCE: ICD-10-CM

## 2024-09-05 LAB
AFP SERPL-MCNC: 13.1 NG/ML
ALBUMIN SERPL BCG-MCNC: 3.9 G/DL (ref 3.5–5.2)
ALP SERPL-CCNC: 91 U/L (ref 40–150)
ALT SERPL W P-5'-P-CCNC: 25 U/L (ref 0–70)
ANION GAP SERPL CALCULATED.3IONS-SCNC: 13 MMOL/L (ref 7–15)
AST SERPL W P-5'-P-CCNC: 27 U/L (ref 0–45)
BASOPHILS # BLD AUTO: 0 10E3/UL (ref 0–0.2)
BASOPHILS NFR BLD AUTO: 1 %
BILIRUB SERPL-MCNC: 0.5 MG/DL
BUN SERPL-MCNC: 26 MG/DL (ref 8–23)
CALCIUM SERPL-MCNC: 7.8 MG/DL (ref 8.8–10.4)
CHLORIDE SERPL-SCNC: 104 MMOL/L (ref 98–107)
CREAT BLD-MCNC: 1.4 MG/DL (ref 0.7–1.3)
CREAT SERPL-MCNC: 1.27 MG/DL (ref 0.67–1.17)
CREAT UR-MCNC: 26.4 MG/DL
EGFRCR SERPLBLD CKD-EPI 2021: 58 ML/MIN/1.73M2
EGFRCR SERPLBLD CKD-EPI 2021: 65 ML/MIN/1.73M2
EOSINOPHIL # BLD AUTO: 0.1 10E3/UL (ref 0–0.7)
EOSINOPHIL NFR BLD AUTO: 2 %
ERYTHROCYTE [DISTWIDTH] IN BLOOD BY AUTOMATED COUNT: 16.2 % (ref 10–15)
GLUCOSE SERPL-MCNC: 105 MG/DL (ref 70–99)
HBA1C MFR BLD: 5.6 %
HCO3 SERPL-SCNC: 23 MMOL/L (ref 22–29)
HCT VFR BLD AUTO: 30.8 % (ref 40–53)
HGB BLD-MCNC: 9.8 G/DL (ref 13.3–17.7)
IMM GRANULOCYTES # BLD: 0.1 10E3/UL
IMM GRANULOCYTES NFR BLD: 1 %
LYMPHOCYTES # BLD AUTO: 0.6 10E3/UL (ref 0.8–5.3)
LYMPHOCYTES NFR BLD AUTO: 12 %
MAGNESIUM SERPL-MCNC: 1.4 MG/DL (ref 1.7–2.3)
MCH RBC QN AUTO: 30.3 PG (ref 26.5–33)
MCHC RBC AUTO-ENTMCNC: 31.8 G/DL (ref 31.5–36.5)
MCV RBC AUTO: 95 FL (ref 78–100)
MICROALBUMIN UR-MCNC: 1144 MG/L
MICROALBUMIN/CREAT UR: 4333.33 MG/G CR (ref 0–17)
MONOCYTES # BLD AUTO: 0.4 10E3/UL (ref 0–1.3)
MONOCYTES NFR BLD AUTO: 7 %
NEUTROPHILS # BLD AUTO: 4.1 10E3/UL (ref 1.6–8.3)
NEUTROPHILS NFR BLD AUTO: 77 %
NRBC # BLD AUTO: 0 10E3/UL
NRBC BLD AUTO-RTO: 0 /100
PHOSPHATE SERPL-MCNC: 3.7 MG/DL (ref 2.5–4.5)
PLATELET # BLD AUTO: 110 10E3/UL (ref 150–450)
POTASSIUM SERPL-SCNC: 3.8 MMOL/L (ref 3.4–5.3)
PROT SERPL-MCNC: 6.4 G/DL (ref 6.4–8.3)
PSA SERPL DL<=0.01 NG/ML-MCNC: 2.12 NG/ML (ref 0–4.5)
RBC # BLD AUTO: 3.23 10E6/UL (ref 4.4–5.9)
SODIUM SERPL-SCNC: 140 MMOL/L (ref 135–145)
TSH SERPL DL<=0.005 MIU/L-ACNC: 1.32 UIU/ML (ref 0.3–4.2)
WBC # BLD AUTO: 5.3 10E3/UL (ref 4–11)

## 2024-09-05 PROCEDURE — G1010 CDSM STANSON: HCPCS | Mod: GC | Performed by: RADIOLOGY

## 2024-09-05 PROCEDURE — 85025 COMPLETE CBC W/AUTO DIFF WBC: CPT | Performed by: PATHOLOGY

## 2024-09-05 PROCEDURE — 83735 ASSAY OF MAGNESIUM: CPT | Performed by: PATHOLOGY

## 2024-09-05 PROCEDURE — 80053 COMPREHEN METABOLIC PANEL: CPT | Performed by: PATHOLOGY

## 2024-09-05 PROCEDURE — 71260 CT THORAX DX C+: CPT | Mod: MG | Performed by: RADIOLOGY

## 2024-09-05 PROCEDURE — 83036 HEMOGLOBIN GLYCOSYLATED A1C: CPT | Performed by: FAMILY MEDICINE

## 2024-09-05 PROCEDURE — G0103 PSA SCREENING: HCPCS | Performed by: PATHOLOGY

## 2024-09-05 PROCEDURE — 90656 IIV3 VACC NO PRSV 0.5 ML IM: CPT | Performed by: FAMILY MEDICINE

## 2024-09-05 PROCEDURE — 99214 OFFICE O/P EST MOD 30 MIN: CPT | Mod: 25 | Performed by: FAMILY MEDICINE

## 2024-09-05 PROCEDURE — 74177 CT ABD & PELVIS W/CONTRAST: CPT | Mod: MG | Performed by: RADIOLOGY

## 2024-09-05 PROCEDURE — 82565 ASSAY OF CREATININE: CPT | Performed by: PATHOLOGY

## 2024-09-05 PROCEDURE — 82105 ALPHA-FETOPROTEIN SERUM: CPT | Performed by: INTERNAL MEDICINE

## 2024-09-05 PROCEDURE — 99000 SPECIMEN HANDLING OFFICE-LAB: CPT | Performed by: PATHOLOGY

## 2024-09-05 PROCEDURE — 84443 ASSAY THYROID STIM HORMONE: CPT | Performed by: PATHOLOGY

## 2024-09-05 PROCEDURE — 36415 COLL VENOUS BLD VENIPUNCTURE: CPT | Performed by: PATHOLOGY

## 2024-09-05 PROCEDURE — G0008 ADMIN INFLUENZA VIRUS VAC: HCPCS | Performed by: FAMILY MEDICINE

## 2024-09-05 PROCEDURE — 82043 UR ALBUMIN QUANTITATIVE: CPT | Performed by: FAMILY MEDICINE

## 2024-09-05 PROCEDURE — 99207 PR NO CHARGE NURSE ONLY: CPT | Performed by: DERMATOLOGY

## 2024-09-05 PROCEDURE — 84100 ASSAY OF PHOSPHORUS: CPT | Performed by: PATHOLOGY

## 2024-09-05 RX ORDER — IOPAMIDOL 755 MG/ML
85 INJECTION, SOLUTION INTRAVASCULAR ONCE
Status: COMPLETED | OUTPATIENT
Start: 2024-09-05 | End: 2024-09-05

## 2024-09-05 RX ADMIN — IOPAMIDOL 85 ML: 755 INJECTION, SOLUTION INTRAVASCULAR at 09:17

## 2024-09-05 NOTE — TELEPHONE ENCOUNTER
9/5 Patient confirmed scheduled appointment:  Date: 11/21/2024  Time: 10:20 am  Visit type: Return Cardiology  Provider: [place]  [place] [facility] [place]imaging: N/A  Additional notes: N/A

## 2024-09-05 NOTE — PROGRESS NOTES
Frandy Workman comes into clinic today at the request of Dr. Cespedes Ordering Provider for a suture removal:  Incision was dry, clean and intact, incision cleansed with wound cleanser and sutures were removed. Pt tolerated the procedure.     1 suture removed form right chest. No signs/symptoms of infection    This service provided today was under the supervising provider of the day Dr. Lyon, who was available if needed.    Graciela Tubbs RN

## 2024-09-05 NOTE — DISCHARGE INSTRUCTIONS
What to Expect When Having Contrast  ______________________________________________________________    For Adults and Children    What should I expect during the test?  We'll inject contrast dye into a vein. Contrast is a liquid with iodine in it. It shows up on X-rays. As we inject the contrast dye, you/your child may:  Feel warm or hot  Notice a metal taste in the mouth  Have minor stomach upset  Feel slight pressure near the bladder (lower belly) or kidneys (middle of the back)  These feelings are normal and will last about 1 to 3 minutes.    What to watch for  Please tell us if you notice any of these problems during the test:  Sneezing  Itching, hives, or swelling in the face  Hoarse voice  Breathing problems  Other new symptoms  We will work to treat any problems right away.    Serious reactions are rare. They can include: Irregular heartbeat, seizures, kidney failure, shock, tissue damage at the needle site, or death.     What should I do at home?    Water intake  If your kidneys are healthy (no kidney problems), drink extra water the day you get home. Water helps clear the contrast from the body, which reduces possible stress on the kidneys.  Encourage children to drink extra water throughout the day.   Adults should drink 4 extra glasses a day.  The contrast will pass out of the body in urine (pee) over the next 24 hours (1 day). You won't feel this. Your urine won't change color.  If you have kidney problems and take metformin, drink 4 to 8 large glasses of water for the next  2 days (48 hours) if you are not on fluid restriction  .  Medicines  If your kidneys are healthy (no kidney problems),  keep taking your usual medicines, including metformin for diabetes (Glucophage or Glucovance).  If you have kidney problems and take metformin:   - Don't take metformin for 2 days (48 hours) after the exam.  Contact your physician to ask about taking or restarting Metformin medication.    Pain and swelling  If you  notice pain and swelling at the needle site, try these tips:  Keep your arm elevated.  Hold a padded ice pack on the area for 15 minutes, then remove it for 15 minutes. Repeat this over the next 24 hours (1 day).       Extra instructions:    Who should I call for medical help?     When to call the nurse line  Call [phone] ([address] [place] [address]) for any of the problems listed below. Tell the nurse you had contrast and describe the symptoms.  Hives, swelling, blistering, skin color changes or other new problems within 2 days (48 hours) of the exam.  Any problems at the injection site, such as:   - Blistering  - Pain that is getting worse  - Skin blanching (turning lighter or changing color)  - Tingling or a loss of feeling  - Any problem that seems to be spreading  Call 9-1-1 if you have:  Wheezing  Trouble breathing                Did this handout help? [email or web address]            What to Expect When Having Contrast  ______________________________________________________________    For Adults and Children    What should I expect during the test?  We'll inject contrast dye into a vein. Contrast is a liquid with iodine in it. It shows up on X-rays. As we inject the contrast dye, you/your child may:  Feel warm or hot  Notice a metal taste in the mouth  Have minor stomach upset  Feel slight pressure near the bladder (lower belly) or kidneys (middle of the back)  These feelings are normal and will last about 1 to 3 minutes.    What to watch for  Please tell us if you notice any of these problems during the test:  Sneezing  Itching, hives, or swelling in the face  Hoarse voice  Breathing problems  Other new symptoms  We will work to treat any problems right away.    Serious reactions are rare. They can include: Irregular heartbeat, seizures, kidney failure, shock, tissue damage at the needle site, or death.     What should I do at home?    Water intake  If your kidneys are healthy (no kidney problems), drink extra water the  day you get home. Water helps clear the contrast from the body, which reduces possible stress on the kidneys.  Encourage children to drink extra water throughout the day.   Adults should drink 4 extra glasses a day.  The contrast will pass out of the body in urine (pee) over the next 24 hours (1 day). You won't feel this. Your urine won't change color.  If you have kidney problems and take metformin, drink 4 to 8 large glasses of water for the next  2 days (48 hours) if you are not on fluid restriction  .  Medicines  If your kidneys are healthy (no kidney problems),  keep taking your usual medicines, including metformin for diabetes (Glucophage or Glucovance).  If you have kidney problems and take metformin:   - Don't take metformin for 2 days (48 hours) after the exam.  Contact your physician to ask about taking or restarting Metformin medication.    Pain and swelling  If you notice pain and swelling at the needle site, try these tips:  Keep your arm elevated.  Hold a padded ice pack on the area for 15 minutes, then remove it for 15 minutes. Repeat this over the next 24 hours (1 day).       Extra instructions:    Who should I call for medical help?     When to call the nurse line  Call [phone] ([phone]) for any of the problems listed below. Tell the nurse you had contrast and describe the symptoms.  Hives, swelling, blistering, skin color changes or other new problems within 2 days (48 hours) of the exam.  Any problems at the injection site, such as:   - Blistering  - Pain that is getting worse  - Skin blanching (turning lighter or changing color)  - Tingling or a loss of feeling  - Any problem that seems to be spreading  Call 9-1-1 if you have:  Wheezing  Trouble breathing                Did this handout help? [email or web address]

## 2024-09-05 NOTE — PROGRESS NOTES
Assessment & Plan     Proliferative diabetic retinopathy of both eyes associated with type 2 diabetes mellitus, unspecified proliferative retinopathy type (H)  [facility] he states; check hgba1c today    Type 2 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, unspecified whether long term insulin use (H)    - INFLUENZA VACCINE, SPLIT VIRUS, TRIVALENT,PF (FLUZONE\FLUARIX)  - TSH with free T4 reflex; Future  - Hemoglobin A1c; Future    Health care maintenance    - PSA, screen; Future    Encounter for screening for malignant neoplasm of prostate    - PSA, screen; Future    Encounter for immunization  I suggest get covid shot at drugstore soon  - INFLUENZA VACCINE, SPLIT VIRUS, TRIVALENT,PF (FLUZONE\FLUARIX)    The longitudinal plan of care for the diagnosis(es)/condition(s) as documented were addressed during this visit. Due to the added complexity in care, I will continue to support Miller in the subsequent management and with ongoing continuity of care.  32 minutes spent by me on the date of the encounter doing chart review, history and exam, documentation and further activities per the note          Return in about 3 months (around 12/5/2024).    Subjective   Miller is a 59 year old, presenting for the following health issues:  Follow Up (Discuss lab results.)      9/5/2024     2:23 PM   Additional Questions   Roomed by KTR     History of Present Illness       Reason for visit:  6 month follow up on all my treatments    He eats 0-1 servings of fruits and vegetables daily.He consumes 0 sweetened beverage(s) daily.He exercises with enough effort to increase his heart rate 10 to 19 minutes per day.  He exercises with enough effort to increase his heart rate 5 days per week.   He is taking medications regularly.   Due for PSA, discussed, he is ok witht his    Fatigue: gradually worse last few months, labs done today rwd, will add TSH. CT done today not read  yet, he is concerned w/ rising afp that his cancer is returning. Sees Hepatology soon to rvw labs and ct. Discussed w/ rising afp, August ct findings, it is possible cancer is present and worsening; will add hgba1c to lab today also re: due and if rising could explain fatigue as well    No acute c/o    Discussed doing flu shot today, covid shot in drugstore soon    Past Medical History:   Diagnosis Date    Anemia 2013    Arthritis     BPH (benign prostatic hyperplasia)     CAD (coronary artery disease) 04/01/2019    Cholelithiasis     Conductive hearing loss 08/16/2017    Depressive disorder 1986    Suffer effects throughout life    Gastroesophageal reflux disease 12/01/2014    HCC (hepatocellular carcinoma) (H) 01/22/2019    History of blood transfusion 2019    Had blood transfussion until Dec 2022    History of diabetic retinopathy 07/2018    HTN (hypertension) 11/20/2019    Hyperlipidemia     Liver cirrhosis secondary to ESTRADA (H)     Liver transplanted (H) 11/11/2019    Portal vein thrombosis 04/11/2019    SCC (squamous cell carcinoma) 02/15/2023    02/15/23:  Left temporal scalp    Type II diabetes mellitus (H)      Past Surgical History:   Procedure Laterality Date    ABDOMEN SURGERY  11/11/2019    Had Liver Transplant    BIOPSY  12/2022    Had biopsy done will have additional procedure 2/15/2023    BYPASS GRAFT ARTERY CORONARY N/A 07/14/2021    Procedure: median sternotomy, on cardiopulmonary bypass, CORONARY ARTERY BYPASS GRAFT (CABG) x2 with left greater saphenous vein endoscopic harvest and left internal mammery artery harvest;  Surgeon: Tom Zapata MD;  Location: [facility] OR    CARDIAC SURGERY  7/2021    Heart Graph. and bypass surgery    CHOLECYSTECTOMY  11/11/2022    Removed with Liver Transplant    COLONOSCOPY      2015    COLONOSCOPY N/A 12/06/2019    Procedure: COLONOSCOPY, WITH POLYPECTOMY AND BIOPSY;  Surgeon: Adam Morton MD;  Location: [facility] GI    CV CENTRAL VENOUS CATHETER PLACEMENT N/A  07/12/2021    Procedure: Central Venous Catheter Placement;  Surgeon: Fermin Polanco MD;  Location:  HEART CARDIAC CATH LAB    CV CORONARY ANGIOGRAM N/A 07/12/2021    Procedure: Coronary Angiogram;  Surgeon: Fermin Polanco MD;  Location:  HEART CARDIAC CATH LAB    CV HEART CATHETERIZATION WITH POSSIBLE INTERVENTION N/A 02/26/2019    Procedure: CORS;  Surgeon: Jagdish Hoyt MD;  Location:  HEART CARDIAC CATH LAB    CV INTRA AORTIC BALLOON N/A 07/12/2021    Procedure: Intra Aortic Balloon Pump Insertion;  Surgeon: Fermin Polanco MD;  Location: [facility] CARDIAC CATH LAB    ESOPHAGOSCOPY, GASTROSCOPY, DUODENOSCOPY (EGD), COMBINED N/A 11/17/2016    Procedure: COMBINED ESOPHAGOSCOPY, GASTROSCOPY, DUODENOSCOPY (EGD);  Surgeon: Santi Rosas MD;  Location:  GI    ESOPHAGOSCOPY, GASTROSCOPY, DUODENOSCOPY (EGD), COMBINED N/A 11/17/2017    Procedure: COMBINED ESOPHAGOSCOPY, GASTROSCOPY, DUODENOSCOPY (EGD);  EGD;  Surgeon: Santi Rosas MD;  Location:  GI    ESOPHAGOSCOPY, GASTROSCOPY, DUODENOSCOPY (EGD), COMBINED N/A 12/28/2018    Procedure: EGD;  Surgeon: Santi Rosas MD;  Location:  OR    ESOPHAGOSCOPY, GASTROSCOPY, DUODENOSCOPY (EGD), COMBINED N/A 12/06/2019    Procedure: ESOPHAGOGASTRODUODENOSCOPY, WITH BIOPSY;  Surgeon: Adam Morton MD;  Location:  GI    ESOPHAGOSCOPY, GASTROSCOPY, DUODENOSCOPY (EGD), COMBINED N/A 02/13/2020    Procedure: ESOPHAGOGASTRODUODENOSCOPY (EGD);  Surgeon: Santi Rosas MD;  Location:  GI    EXCISE MASS ABDOMEN N/A 07/13/2023    Procedure: Laparoscopic lysis of adhesions, laparoscopic resection of abdominal mass;  Surgeon: Ruiz Chu MD;  Location:  OR    HEAD & NECK SURGERY      12/2017 at [facility].     IMPLANT GOLD WEIGHT EYELID Right 11/16/2017    Procedure: IMPLANT WEIGHT EYELID;  Right Upper Eyelid Weight, right tarsal strip lower eyelid;  Surgeon: Milana Malave,  MD;  [facility] OR    IR CHEMO EMBOLIZATION  2019    KNEE SURGERY Left     ORTHOPEDIC SURGERY      PAROTIDECTOMY, RADICAL NECK DISSECTION Right 2017    Procedure: PAROTIDECTOMY, RADICAL NECK DISSECTION;  Right Superfacial Parotidectomy , Facial nerve repair. with [facility] facial nerve monitor.;  Surgeon: Asiya Morgan MD;  Location: [facility] OR    PHACOEMULSIFICATION CLEAR CORNEA WITH STANDARD INTRAOCULAR LENS IMPLANT Right 2023    Procedure: PHACOEMULSIFICATION, COMPLEX CATARACT, WITH INTRAOCULAR LENS IMPLANT WITH TRYPAN RIGHT EYE;  Surgeon: Enriqueta Martin MD;  Location: [facility] OR    PHACOEMULSIFICATION WITH STANDARD INTRAOCULAR LENS IMPLANT Left 2023    Procedure: PHACOEMULSIFICATION, COMPLEX CATARACT, WITH STANDARD INTRAOCULAR LENS IMPLANT INSERTION LEFT EYE;  Surgeon: Enriqueta Martin MD;  Location: [facility] OR    PICC INSERTION Left 2017    4fr SL BioFlo PICC, 44cm in the L basilic vein w/ tip in the low SVC    RETURN LIVER TRANSPLANT N/A 2019    Procedure: Exploratory laparotomy, hematoma evacuation, abdominal washout;  Surgeon: Александр Ramos MD;  [facility]: [facility] OR    TRANSPLANT LIVER RECIPIENT  DONOR N/A 2019    Procedure: TRANSPLANT, LIVER, RECIPIENT,  DONOR;  Surgeon: Александр Ramos MD;  Location: [facility] OR    VASCULAR SURGERY       Current Outpatient Medications   Medication Sig Dispense Refill    acetaminophen (TYLENOL) 325 MG tablet TAKE 1 TABLET BY MOUTH EVERY SIX HOURS AS NEEDED FOR MILD PAIN 100 tablet 3    albuterol (PROAIR HFA/PROVENTIL HFA/VENTOLIN HFA) 108 (90 Base) MCG/ACT inhaler Inhale 2 puffs into the lungs every 4 hours as needed for shortness of breath, wheezing or cough 18 g 1    ammonium lactate (AMLACTIN) 12 % external cream APPLY TOPICALLY DAILY AS NEEDED FOR DRY SKIN (ON FEET) 140 g 5    benzoyl peroxide 5 % external liquid Use topically in showers as a body wash 226 g 9    blood glucose (NO BRAND SPECIFIED) lancets  standard Use to test blood sugar 1 times daily or as directed. 100 each 11    Continuous Glucose Sensor (FREESTYLE CLAUDIA 2 SENSOR) [facility] 1 each See Admin Instructions Change every 14 days. 7 each 3    ELIQUIS ANTICOAGULANT 5 MG tablet TAKE 1 TABLET BY MOUTH TWICE A DAY 60 tablet 3    empagliflozin (JARDIANCE) 10 MG TABS tablet Take 1 tablet (10 mg) by mouth daily 30 tablet 11    insulin aspart (FIASP FLEXTOUCH) 100 UNIT/ML pen-injector Inject 5-10 Units Subcutaneous 4 times daily (with meals and nightly) 1unit : 8 g carb before meals. Also add 1 unit : 50 mg/dl >180 before meals and at bedtime. Approx 45 units daily. 45 mL 3    insulin degludec (TRESIBA FLEXTOUCH) 100 UNIT/ML pen Inject 15 units subcutaneous once daily 45 mL 3    insulin pen needle (ULTICARE MICRO) 32G X 4 MM miscellaneous USE 5 PER  each 3    K-Phos-Neutral 155-852-130 MG tablet Take 1 tablet by mouth 4 times daily 120 tablet 1    ketoconazole (NIZORAL) 2 % external shampoo Apply thin layer topically to scalp in shower (leave on 5 min prior to rinse); may also use as a body wash 120 mL 11    lamiVUDine (EPIVIR) 100 MG tablet TAKE 1 TABLET BY MOUTH ONCE DAILY 90 tablet 2    lisinopril (ZESTRIL) 5 MG tablet Take 1 tablet (5 mg) by mouth daily 90 tablet 3    loperamide (IMODIUM) 2 MG capsule TAKE ONE TO TWO CAPSULES BY MOUTH FOUR TIMES A DAY AS NEEDED FOR DIARRHEA (DO NOT TAKE MORE THAN 8 CAPSULES PER DAY). 120 capsule 1    magnesium oxide 400 MG tablet TAKE 1 TABLET BY MOUTH DAILY 30 tablet 5    metoprolol succinate ER (TOPROL XL) 25 MG 24 hr tablet TAKE 1 TABLET BY MOUTH DAILY 30 tablet 3    Multiple Vitamin (DAILY-GLADIS MULTIVITAMIN) TABS TAKE 1 TABLET BY MOUTH ONCE DAILY 30 tablet 11    mycophenolate (GENERIC EQUIVALENT) 250 MG capsule TAKE TWO CAPSULES BY MOUTH EVERY 12 HOURS 120 capsule 11    NIFEdipine ER OSMOTIC (PROCARDIA XL) 60 MG 24 hr tablet TAKE 1 TABLET(60 MG) BY MOUTH TWICE DAILY 6 tablet 0    omega 3 1000 MG CAPS Take 2,000 mg by  mouth 2 times daily 120 capsule 10    ondansetron (ZOFRAN ODT) 8 MG ODT tab Take 1 tablet (8 mg) by mouth every 8 hours as needed for nausea 15 tablet 3    pantoprazole (PROTONIX) 40 MG EC tablet TAKE 1 TABLET BY MOUTH ONCE DAILY BEFORE BREAKFAST 90 tablet 3    predniSONE (DELTASONE) 5 MG tablet TAKE ONE TABLET BY MOUTH ONCE DAILY 30 tablet 11    rosuvastatin (CRESTOR) 20 MG tablet Take 1 tablet (20 mg) by mouth daily 90 tablet 3    sodium zirconium cyclosilicate (LOKELMA) 10 g PACK packet Take 10 gram 3x/day for 2 days then 10 grams daily. (Patient taking differently: Take 10 g by mouth daily. Take 10 gram 3x/day for 2 days then 10 grams daily.) 30 packet 11    SORAfenib (NEXAVAR) 200 MG tablet Take 1 tablet (200 mg) by mouth 2 times daily On an empty stomach 1 hour before or 2 hours after a meal. 60 tablet 0    tamsulosin (FLOMAX) 0.4 MG capsule TAKE 1 CAPSULE BY MOUTH ONCE DAILY 30 capsule 11    UltiCare Alcohol Swabs 70 % PADS 1 each by Other route 4 times daily 400 each 1    vitamin D3 (CHOLECALCIFEROL) 50 mcg (2000 units) tablet TAKE 1 TABLET BY MOUTH ONCE DAILY 30 tablet 8    amoxicillin-clavulanate (AUGMENTIN) 875-125 MG tablet Take 1 tablet by mouth 2 times daily 20 tablet 0    insulin aspart (NOVOLOG FLEXPEN) 100 UNIT/ML pen INJECT 5-10U SUBCUTANEOUSLY FOUR TIMES A DAY ( WITH MEALS AND EVERY NIGHT ) ONE UNIT :8CARB BEFORE MEALS . ALSO ADD ONE UNIT : 50MG/DL 15 mL 11     Current Facility-Administered Medications   Medication Dose Route Frequency Provider Last Rate Last Admin    aflibercept (EYLEA) injection prefilled syringe 2 mg  2 mg Intravitreal Q28 Days Enriqueta Martin MD   2 mg at 05/31/23 1144    dexamethasone (OZURDEX) intravitreal implant 0.7 mg  0.7 mg Intravitreal Q2 Months Enriqueta Martin MD        dexamethasone (OZURDEX) intravitreal implant 0.7 mg  0.7 mg Intravitreal Q2 Months Enriqueta Martin MD        faricimab-svoa (VABYSMO) intravitreal injection 6 mg  6 mg  Intravitreal q28 days Enriqueta Martin MD faricimab-svoa ([facility]) intravitreal injection 6 mg  6 mg Intravitreal q28 days Enriqueta Martin MD faricimab-svoa ([facility]) intravitreal injection 6 mg  6 mg Intravitreal q28 days Enriqueta Martin MD   6 mg at 24 1001    lidocaine (PF) (XYLOCAINE) injection 1 mL  1 mL Ophthalmic Q2 Months Enriqueta Martin MD         Allergies   Allergen Reactions    Codeine Other (See Comments)     Cannot take due to liver  Cannot tolerate oral narcotics    Seasonal Allergies      Sneezing, coughing, runny and itchy eyes     Family History   Problem Relation Age of Onset    Skin Cancer Mother     Cancer Mother         mastectomy    Diabetes Mother          3/2016    Cerebrovascular Disease Mother         Passed away in Feb of this year, 80 years old.    Thyroid Disease Mother     Depression Mother     Breast Cancer Mother     Obesity Mother         280 pounds  from this and complications from D    Pancreatic Cancer Father 60    Prostate Cancer Father         Currently in Remission    Macular Degeneration Father     Glaucoma Father     Skin Cancer Father     Coronary Artery Disease Father         Started in his 70's  at 88 in Octobe2023    Colon Cancer Father     Thyroid Disease Sister     Depression Sister     Asthma Sister     Sleep Apnea Brother     Colorectal Cancer Maternal Grandmother     Cancer Maternal Grandmother     Substance Abuse Maternal Grandmother         Alcohol    Prostate Cancer Maternal Grandfather     Substance Abuse Maternal Grandfather         Alcohol    Colorectal Cancer Paternal Grandmother     Asthma Sister         Had since birth    Liver Disease No family hx of     Melanoma No family hx of     Anesthesia Reaction No family hx of     Deep Vein Thrombosis (DVT) No family hx of      Social History     Socioeconomic History    Marital status:      Spouse name: Not on file    Number of  children: Not on file    Years of education: Not on file    Highest education level: Bachelor's degree (e.g., BA, AB, BS)   Occupational History    Not on file   Tobacco Use    Smoking status: Former     Types: Dip, chew, snus or snuff     Passive exposure: Past    Smokeless tobacco: Former     Types: Chew     Quit date: 10/31/2017    Tobacco comments:     Quit [place] after Doctor told me in 2017 that if I didn't I would   Vaping Use    Vaping status: Never Used   Substance and Sexual Activity    Alcohol use: No     Comment: quit Sept. 1996    Drug use: No    Sexual activity: Not Currently     Partners: Female     Birth control/protection: Condom   Other Topics Concern    Parent/sibling w/ CABG, MI or angioplasty before 65F 55M? No   Social History Narrative    Prior [facility] [facility]     Social Determinants of Health     Financial Resource Strain: Low Risk  (1/23/2024)    Financial Resource Strain     Within the past 12 months, have you or your family members you live with been unable to get utilities (heat, electricity) when it was really needed?: No   Food Insecurity: Low Risk  (1/23/2024)    Food Insecurity     Within the past 12 months, did you worry that your food would run out before you got money to buy more?: No     Within the past 12 months, did the food you bought just not last and you didn t have money to get more?: No   Transportation Needs: Low Risk  (1/23/2024)    Transportation Needs     Within the past 12 months, has lack of transportation kept you from medical appointments, getting your medicines, non-medical meetings or appointments, work, or from getting things that you need?: No   Physical Activity: Insufficiently Active (10/13/2020)    Exercise Vital Sign     Days of Exercise per Week: 1 day     Minutes of Exercise per Session: 60 min   Stress: Stress Concern Present (10/13/2020)    [employer] [place] [employer] - Occupational Stress Questionnaire     Feeling of Stress : To  some extent   Social Connections: Socially Isolated (10/13/2020)    Social Connection and Isolation Panel [NHANES]     Frequency of Communication with Friends and Family: Once a week     Frequency of Social Gatherings with Friends and Family: Once a week     Attends Judaism Services: Never     Active Member of Clubs or Organizations: No     Attends Club or Organization Meetings: Never     Marital Status:    Interpersonal Safety: Low Risk  (4/30/2024)    Interpersonal Safety     Do you feel physically and emotionally safe where you currently live?: Yes     Within the past 12 months, have you been hit, slapped, kicked or otherwise physically hurt by someone?: No     Within the past 12 months, have you been humiliated or emotionally abused in other ways by your partner or ex-partner?: No   Housing Stability: Low Risk  (1/23/2024)    Housing Stability     Do you have housing? : Yes     Are you worried about losing your housing?: No                     Objective    /75 (BP Location: Right arm, Patient Position: Sitting, Cuff Size: Adult Regular)   Pulse 77   Wt 77.6 kg (171 lb)   SpO2 100%   BMI 25.24 kg/m    Body mass index is 25.24 kg/m .  Physical Exam   GENERAL: alert and no distress  MS: no gross musculoskeletal defects noted, no edema            Signed Electronically by: Lamin Ortiz MD

## 2024-09-05 NOTE — TELEPHONE ENCOUNTER
9/5 Left Voicemail (1st Attempt) and sent a MyChart (1st Attempt) for the patient to call back and schedule the following:    Appointment type: Return Cardiology  Provider: [place]  Return date: November   Specialty phone number: [phone] opt 1  Additional appointment(s) needed: n/a  Additonal Notes: n/a

## 2024-09-06 LAB
ADV 40+41 DNA STL QL NAA+NON-PROBE: NEGATIVE
ASTRO TYP 1-8 RNA STL QL NAA+NON-PROBE: NEGATIVE
C CAYETANENSIS DNA STL QL NAA+NON-PROBE: NEGATIVE
CAMPYLOBACTER DNA SPEC NAA+PROBE: NEGATIVE
CRYPTOSP DNA STL QL NAA+NON-PROBE: NEGATIVE
E COLI O157 DNA STL QL NAA+NON-PROBE: NORMAL
E HISTOLYT DNA STL QL NAA+NON-PROBE: NEGATIVE
EAEC ASTA GENE ISLT QL NAA+PROBE: NEGATIVE
EC STX1+STX2 GENES STL QL NAA+NON-PROBE: NEGATIVE
EPEC EAE GENE STL QL NAA+NON-PROBE: NEGATIVE
ETEC LTA+ST1A+ST1B TOX ST NAA+NON-PROBE: NEGATIVE
G LAMBLIA DNA STL QL NAA+NON-PROBE: NEGATIVE
NOROVIRUS GI+II RNA STL QL NAA+NON-PROBE: NEGATIVE
P SHIGELLOIDES DNA STL QL NAA+NON-PROBE: NEGATIVE
RVA RNA STL QL NAA+NON-PROBE: NEGATIVE
SALMONELLA SP RPOD STL QL NAA+PROBE: NEGATIVE
SAPO I+II+IV+V RNA STL QL NAA+NON-PROBE: NEGATIVE
SHIGELLA SP+EIEC IPAH ST NAA+NON-PROBE: NEGATIVE
V CHOLERAE DNA SPEC QL NAA+PROBE: NEGATIVE
VIBRIO DNA SPEC NAA+PROBE: NEGATIVE
Y ENTEROCOL DNA STL QL NAA+PROBE: NEGATIVE

## 2024-09-06 PROCEDURE — 87507 IADNA-DNA/RNA PROBE TQ 12-25: CPT | Performed by: INTERNAL MEDICINE

## 2024-09-06 PROCEDURE — 99000 SPECIMEN HANDLING OFFICE-LAB: CPT | Performed by: PATHOLOGY

## 2024-09-08 PROBLEM — L57.0 AK (ACTINIC KERATOSIS): Status: ACTIVE | Noted: 2024-09-08

## 2024-09-09 ENCOUNTER — ONCOLOGY VISIT (OUTPATIENT)
Dept: ONCOLOGY | Facility: CLINIC | Age: 59
End: 2024-09-09
Attending: INTERNAL MEDICINE
Payer: MEDICARE

## 2024-09-09 VITALS
HEART RATE: 89 BPM | DIASTOLIC BLOOD PRESSURE: 71 MMHG | SYSTOLIC BLOOD PRESSURE: 153 MMHG | WEIGHT: 166.6 LBS | OXYGEN SATURATION: 99 % | TEMPERATURE: 98.2 F | BODY MASS INDEX: 24.59 KG/M2 | RESPIRATION RATE: 16 BRPM

## 2024-09-09 DIAGNOSIS — C78.6 MALIGNANT NEOPLASM METASTATIC TO PERITONEUM (H): ICD-10-CM

## 2024-09-09 DIAGNOSIS — N18.32 STAGE 3B CHRONIC KIDNEY DISEASE (H): ICD-10-CM

## 2024-09-09 DIAGNOSIS — C22.0 HCC (HEPATOCELLULAR CARCINOMA) (H): Primary | ICD-10-CM

## 2024-09-09 DIAGNOSIS — I82.452 ACUTE DEEP VEIN THROMBOSIS (DVT) OF LEFT PERONEAL VEIN (H): ICD-10-CM

## 2024-09-09 DIAGNOSIS — N18.32 ANEMIA OF CHRONIC RENAL FAILURE, STAGE 3B (H): ICD-10-CM

## 2024-09-09 DIAGNOSIS — D63.1 ANEMIA OF CHRONIC RENAL FAILURE, STAGE 3B (H): ICD-10-CM

## 2024-09-09 DIAGNOSIS — E11.3293 TYPE 2 DIABETES MELLITUS WITH MILD NONPROLIFERATIVE RETINOPATHY OF BOTH EYES WITHOUT MACULAR EDEMA, UNSPECIFIED WHETHER LONG TERM INSULIN USE (H): ICD-10-CM

## 2024-09-09 DIAGNOSIS — Z94.4 LIVER TRANSPLANT RECIPIENT (H): ICD-10-CM

## 2024-09-09 PROCEDURE — G0463 HOSPITAL OUTPT CLINIC VISIT: HCPCS | Performed by: INTERNAL MEDICINE

## 2024-09-09 PROCEDURE — G2211 COMPLEX E/M VISIT ADD ON: HCPCS | Performed by: INTERNAL MEDICINE

## 2024-09-09 PROCEDURE — 99215 OFFICE O/P EST HI 40 MIN: CPT | Performed by: INTERNAL MEDICINE

## 2024-09-09 RX ORDER — PREDNISOLONE ACETATE 10 MG/ML
SUSPENSION/ DROPS OPHTHALMIC
COMMUNITY
Start: 2023-10-04

## 2024-09-09 ASSESSMENT — PAIN SCALES - GENERAL: PAINLEVEL: MODERATE PAIN (5)

## 2024-09-09 NOTE — LETTER
9/9/2024         RE: Frandy Workman  [address] [facility] [address]        Miller Workman returns today in follow-up of metastatic hepatocellular carcinoma in the setting of a prior liver transplant.    He is 59 years old and diagnosed 5 years ago with 2 lesions in his cirrhotic liver which were treated with TACE followed by liver transplant in 2019.  In June 2023 developed peritoneal metastases and was started on therapy with sorafenib.  He was intolerant of 400 mg twice per day due to his diarrhea and nausea but his subsequent had good control of his disease with 200 mg twice per day.  When I last saw him a little bit over a month ago he had some possible enlargement of a peritoneal nodule and slight increase in his AFP.  He returns today for her next planned response assessment.    He continues to be fatigued which she attributes to his viral infection that he had in May.  This is stable and limiting the amount of exercise he is getting.  He has diarrhea that is coming and going and unpredictable pattern.  He occasionally have very watery stools and can get control of that with the use of Lomotil.  He has other days where his stools are nearly normal.  There is no blood in the stools.  He said no fevers chills or sweats.    On exam he appears older than his stated age but in no acute distress.    I personally reviewed his CT scan over the results with him.  There is been no further increase in his peritoneal nodule and no new sites of disease are apparent.    His alpha-fetoprotein is gone up only slightly to 13.  His renal failure is stable with a creatinine of 1.27 and electrolytes are normal.  His bilirubin, albumin liver enzymes are normal.  His blood counts are stable with a hemoglobin of 9.8 and platelets of 110.  Stool cultures show no pathogens.    Assessment/plan: Metastatic hepatocellular carcinoma with a prior liver transplant.  He is still tolerating the sorafenib at his current dose.  While  it may be contributing some to his diarrhea and fatigue the symptoms really seem to have worsened a long time after he had started on this and his dosing has not changed.  Other causes of this are not readily apparent.  We discussed some strategies to help manage this symptomatically.  We will continue on with his current dose and schedule of sorafenib and plan another response assessment in 2 to 3 months.  If his diarrhea or fatigue or worsening he will let us know so we can reevaluate him sooner.    Total time by me on date of service inclusive of the above visit with review of imaging and multiple lab studies, ordering, and documentation was approximately 40 minutes.        Miguel Villarreal MD

## 2024-09-09 NOTE — LETTER
9/9/2024      Frandy Workman  [address] [facility] [address]      Dear Colleague,    Thank you for referring your patient, Frandy Workman, to the [facility]. Please see a copy of my visit note below.      Miller Workman returns today in follow-up of metastatic hepatocellular carcinoma in the setting of a prior liver transplant.    He is 59 years old and diagnosed 5 years ago with 2 lesions in his cirrhotic liver which were treated with TACE followed by liver transplant in 2019.  In June 2023 developed peritoneal metastases and was started on therapy with sorafenib.  He was intolerant of 400 mg twice per day due to his diarrhea and nausea but his subsequent had good control of his disease with 200 mg twice per day.  When I last saw him a little bit over a month ago he had some possible enlargement of a peritoneal nodule and slight increase in his AFP.  He returns today for her next planned response assessment.    He continues to be fatigued which she attributes to his viral infection that he had in May.  This is stable and limiting the amount of exercise he is getting.  He has diarrhea that is coming and going and unpredictable pattern.  He occasionally have very watery stools and can get control of that with the use of Lomotil.  He has other days where his stools are nearly normal.  There is no blood in the stools.  He said no fevers chills or sweats.    On exam he appears older than his stated age but in no acute distress.    I personally reviewed his CT scan over the results with him.  There is been no further increase in his peritoneal nodule and no new sites of disease are apparent.    His alpha-fetoprotein is gone up only slightly to 13.  His renal failure is stable with a creatinine of 1.27 and electrolytes are normal.  His bilirubin, albumin liver enzymes are normal.  His blood counts are stable with a hemoglobin of 9.8 and platelets of 110.  Stool cultures show no  pathogens.    Assessment/plan: Metastatic hepatocellular carcinoma with a prior liver transplant.  He is still tolerating the sorafenib at his current dose.  While it may be contributing some to his diarrhea and fatigue the symptoms really seem to have worsened a long time after he had started on this and his dosing has not changed.  Other causes of this are not readily apparent.  We discussed some strategies to help manage this symptomatically.  We will continue on with his current dose and schedule of sorafenib and plan another response assessment in 2 to 3 months.  If his diarrhea or fatigue or worsening he will let us know so we can reevaluate him sooner.    Total time by me on date of service inclusive of the above visit with review of imaging and multiple lab studies, ordering, and documentation was approximately 40 minutes.      Again, thank you for allowing me to participate in the care of your patient.        Sincerely,        Miguel Villarreal MD

## 2024-09-09 NOTE — PROGRESS NOTES
Miller Workman returns today in follow-up of metastatic hepatocellular carcinoma in the setting of a prior liver transplant.    He is 59 years old and diagnosed 5 years ago with 2 lesions in his cirrhotic liver which were treated with TACE followed by liver transplant in 2019.  In June 2023 developed peritoneal metastases and was started on therapy with sorafenib.  He was intolerant of 400 mg twice per day due to his diarrhea and nausea but his subsequent had good control of his disease with 200 mg twice per day.  When I last saw him a little bit over a month ago he had some possible enlargement of a peritoneal nodule and slight increase in his AFP.  He returns today for her next planned response assessment.    He continues to be fatigued which she attributes to his viral infection that he had in May.  This is stable and limiting the amount of exercise he is getting.  He has diarrhea that is coming and going and unpredictable pattern.  He occasionally have very watery stools and can get control of that with the use of Lomotil.  He has other days where his stools are nearly normal.  There is no blood in the stools.  He said no fevers chills or sweats.    On exam he appears older than his stated age but in no acute distress.    I personally reviewed his CT scan over the results with him.  There is been no further increase in his peritoneal nodule and no new sites of disease are apparent.    His alpha-fetoprotein is gone up only slightly to 13.  His renal failure is stable with a creatinine of 1.27 and electrolytes are normal.  His bilirubin, albumin liver enzymes are normal.  His blood counts are stable with a hemoglobin of 9.8 and platelets of 110.  Stool cultures show no pathogens.    Assessment/plan: Metastatic hepatocellular carcinoma with a prior liver transplant.  He is still tolerating the sorafenib at his current dose.  While it may be contributing some to his diarrhea and fatigue the symptoms really seem to  have worsened a long time after he had started on this and his dosing has not changed.  Other causes of this are not readily apparent.  We discussed some strategies to help manage this symptomatically.  We will continue on with his current dose and schedule of sorafenib and plan another response assessment in 2 to 3 months.  If his diarrhea or fatigue or worsening he will let us know so we can reevaluate him sooner.    Total time by me on date of service inclusive of the above visit with review of imaging and multiple lab studies, ordering, and documentation was approximately 40 minutes.

## 2024-09-09 NOTE — NURSING NOTE
"Oncology Rooming Note    September 9, 2024 2:08 PM   Frandy Workman is a 59 year old male who presents for:    Chief Complaint   Patient presents with    Oncology Clinic Visit     HCC (hepatocellular carcinoma)     Initial Vitals: BP (!) 153/71 (BP Location: Left arm, Patient Position: Sitting, Cuff Size: Adult Regular)   Pulse 89   Temp 98.2  F (36.8  C) (Oral)   Resp 16   Wt 75.6 kg (166 lb 9.6 oz)   SpO2 99%   BMI 24.59 kg/m   Estimated body mass index is 24.59 kg/m  as calculated from the following:    Height as of 8/8/24: 1.753 m (5' 9.02\").    Weight as of this encounter: 75.6 kg (166 lb 9.6 oz). Body surface area is 1.92 meters squared.  Moderate Pain (5) Comment: Data Unavailable   No LMP for male patient.  Allergies reviewed: Yes  Medications reviewed: Yes    Medications: MEDICATION REFILLS NEEDED TODAY. Provider was notified.  Pharmacy name entered into [facility]:    [facility] - [place], [place] - [address] [place] [address] [facility] [place], [place] - [address] [facility] [id number]  [place] [address]    Frailty Screening:   Is the patient here for a new oncology consult visit in cancer care? 2. No      Clinical concerns:  Refill: Sorafenib  Pain near kidneys, bloated, bruising that's not healing       Maryanne Melchor, EMT  9/9/2024              "

## 2024-09-10 DIAGNOSIS — C78.6 MALIGNANT NEOPLASM METASTATIC TO PERITONEUM (H): ICD-10-CM

## 2024-09-10 DIAGNOSIS — C22.0 HCC (HEPATOCELLULAR CARCINOMA) (H): Primary | ICD-10-CM

## 2024-09-10 DIAGNOSIS — E83.39 HYPOPHOSPHATEMIA: ICD-10-CM

## 2024-09-10 RX ORDER — SORAFENIB 200 MG/1
200 TABLET, FILM COATED ORAL 2 TIMES DAILY
Qty: 60 TABLET | Refills: 0 | Status: SHIPPED | OUTPATIENT
Start: 2024-09-10 | End: 2024-10-07

## 2024-09-10 NOTE — TELEPHONE ENCOUNTER
Medication requested: K-Phos-Neutral    Last prescribing provider:  on 7/5/24    Last clinic visit date: 9/9/24 with Dr. Villarreal    Recommendations for requested medication (if none, N/A): NA    Any other pertinent information (if none, N/A): NA    Refilled: Y/N, if NO, why?    Pended and Routed to Dr. Miguel Villarreal

## 2024-09-11 RX ORDER — DIBASIC SODIUM PHOSPHATE, MONOBASIC POTASSIUM PHOSPHATE AND MONOBASIC SODIUM PHOSPHATE 852; 155; 130 MG/1; MG/1; MG/1
250 TABLET ORAL 4 TIMES DAILY
Qty: 120 TABLET | Refills: 1 | Status: SHIPPED | OUTPATIENT
Start: 2024-09-11

## 2024-09-12 ENCOUNTER — TELEPHONE (OUTPATIENT)
Dept: TRANSPLANT | Facility: CLINIC | Age: 59
End: 2024-09-12
Payer: MEDICARE

## 2024-09-12 ASSESSMENT — ENCOUNTER SYMPTOMS: NEW SYMPTOMS OF CORONARY ARTERY DISEASE: 0

## 2024-09-16 ENCOUNTER — MYC MEDICAL ADVICE (OUTPATIENT)
Dept: NEPHROLOGY | Facility: CLINIC | Age: 59
End: 2024-09-16

## 2024-09-29 DIAGNOSIS — C78.6 MALIGNANT NEOPLASM METASTATIC TO PERITONEUM (H): ICD-10-CM

## 2024-09-29 DIAGNOSIS — C22.0 HCC (HEPATOCELLULAR CARCINOMA) (H): Primary | ICD-10-CM

## 2024-10-07 DIAGNOSIS — R05.9 COUGH, UNSPECIFIED TYPE: ICD-10-CM

## 2024-10-07 DIAGNOSIS — C22.0 HCC (HEPATOCELLULAR CARCINOMA) (H): Primary | ICD-10-CM

## 2024-10-07 DIAGNOSIS — C78.6 MALIGNANT NEOPLASM METASTATIC TO PERITONEUM (H): ICD-10-CM

## 2024-10-07 RX ORDER — SORAFENIB 200 MG/1
200 TABLET, FILM COATED ORAL 2 TIMES DAILY
Qty: 60 TABLET | Refills: 0 | Status: SHIPPED | OUTPATIENT
Start: 2024-10-10

## 2024-10-08 ENCOUNTER — TELEPHONE (OUTPATIENT)
Dept: ONCOLOGY | Facility: CLINIC | Age: 59
End: 2024-10-08
Payer: MEDICARE

## 2024-10-08 DIAGNOSIS — Z95.1 S/P CABG (CORONARY ARTERY BYPASS GRAFT): ICD-10-CM

## 2024-10-08 RX ORDER — METOPROLOL SUCCINATE 25 MG/1
25 TABLET, EXTENDED RELEASE ORAL DAILY
Qty: 30 TABLET | Refills: 3 | Status: SHIPPED | OUTPATIENT
Start: 2024-10-08

## 2024-10-08 NOTE — TELEPHONE ENCOUNTER
Oral Chemotherapy Monitoring Program    Subjective/Objective:  Frandy Workman is a 59 year old male contacted by phone for a follow-up visit for oral chemotherapy.  Miller states that he is feeling ok except for he has more fatigue and his diarrhea is not in control.  He is taking loperamide 4 mg 4 times daily. He also stated that he was getting some roughness on his hands and feet.  We reviewed using non scented lotions more frequently to avoid HFS.  We also reviewed that he needs to go in monthly labs.        7/5/2024     9:00 AM 7/15/2024     1:00 PM 8/6/2024     9:00 AM 8/19/2024     3:00 PM 9/10/2024     9:00 AM 10/7/2024     8:00 AM 10/8/2024     2:00 PM   ORAL CHEMOTHERAPY   Assessment Type Other;Refill Lab Monitoring Refill Monthly Follow up Refill Refill Monthly Follow up   Diagnosis Code Hepatocellular Cancer Hepatocellular Cancer Hepatocellular Cancer Hepatocellular Cancer Hepatocellular Cancer Hepatocellular Cancer Hepatocellular Cancer   Providers Dr. Cherelle Villarreal   [facility]   Is this patient followed by the [facility] OC team? No No No No No No No   Drug Name Nexavar (sorafenib) Nexavar (sorafenib) Nexavar (sorafenib) Nexavar (sorafenib) Nexavar (sorafenib) Nexavar (sorafenib) Nexavar (sorafenib)   Dose 200 mg 200 mg 200 mg 200 mg 200 mg 200 mg 200 mg   Current Schedule BID BID BID BID BID BID BID   Cycle Details Continuous Continuous Continuous Continuous Continuous Continuous Continuous   Planned next cycle start date    9/10/2024      Doses missed in last 2 weeks    0      Adherence Assessment    Adherent      Adverse Effects    Diarrhea;Fatigue;Palmar-plantar Erythrodysethesia Syndrome;Thrombocytopenia;Other (See Note for Details)      Diarrhea    Grade 2      Pharmacist Intervention(diarrhea)    Yes      Intervention(s)    Patient education      Palmar-plantar Erythrodysethesia  "syndrome[hand-foot syndrome]    Grade 1      Pharmacist Intervention(Palmar-plantar)    No      Fatigue    Grade 2      Pharmacist Intervention(fatigue)    Yes      Intervention(s)    Patient education      Thrombocytopenia    Grade 2      Pharmacist Intervention(thrombocytopenia)    Yes      Intervention(s)    Patient education;Referral to oncology provider      Other (See Note for Details)    abdominal pain      Pharmacist intervention(other)    No          Vitals:  BP:   BP Readings from Last 1 Encounters:   09/09/24 (!) 153/71     Wt Readings from Last 1 Encounters:   09/09/24 75.6 kg (166 lb 9.6 oz)     Estimated body surface area is 1.92 meters squared as calculated from the following:    Height as of 8/8/24: 1.753 m (5' 9.02\").    Weight as of 9/9/24: 75.6 kg (166 lb 9.6 oz).    Labs:  No results found for NA within last 30 days.     No results found for K within last 30 days.     No results found for CA within last 30 days.     No results found for Mag within last 30 days.     No results found for Phos within last 30 days.     No results found for ALBUMIN within last 30 days.     No results found for BUN within last 30 days.     No results found for CR within last 30 days.       No results found for AST within last 30 days.     No results found for ALT within last 30 days.     No results found for BILITOTAL within last 30 days.       No results found for WBC within last 30 days.     No results found for HGB within last 30 days.     No results found for PLT within last 30 days.     No results found for ANC within last 30 days.     Assessment/Plan:  Continue same sorafenib dose as ordered  Increase frequency of lotion use to hands and feet.  Continue with the same loperamide dose and fluid intake until we get back to him with any changes.  Make a lab appointment soon.    Curtis Butler PharmD, BCOP  10/8/2024   "

## 2024-10-09 RX ORDER — ALBUTEROL SULFATE 90 UG/1
2 INHALANT RESPIRATORY (INHALATION) EVERY 4 HOURS PRN
Qty: 18 G | Refills: 0 | Status: SHIPPED | OUTPATIENT
Start: 2024-10-09

## 2024-10-14 ENCOUNTER — TELEPHONE (OUTPATIENT)
Dept: ONCOLOGY | Facility: CLINIC | Age: 59
End: 2024-10-14

## 2024-10-14 RX ORDER — DIPHENOXYLATE HYDROCHLORIDE AND ATROPINE SULFATE 2.5; .025 MG/1; MG/1
1 TABLET ORAL 4 TIMES DAILY PRN
Qty: 20 TABLET | Refills: 0 | Status: CANCELLED | OUTPATIENT
Start: 2024-10-14

## 2024-10-14 NOTE — TELEPHONE ENCOUNTER
Oral Chemotherapy Monitoring Program    Subjective/Objective:  Frandy Workman is a 59 year old male contacted by phone for a follow-up visit for oral chemotherapy.  Miller stated that he is doing a bit better today.  His diarrhea has somewhat improved but would like to try lomotil to calm down the morning stools.  Left Dr Villarreal and Jaida a message and an Rx for lomotil was entered for Dr Villarreal to sign.  He is staying very well hydrated.  He stated that since things have improved, he would like to decline the offer to be seen by an SERA.  He will evaluate that in the next week or 2.  He also stated that his hands and feet are much improved from last week.          7/15/2024     1:00 PM 8/6/2024     9:00 AM 8/19/2024     3:00 PM 9/10/2024     9:00 AM 10/7/2024     8:00 AM 10/8/2024     2:00 PM 10/14/2024    11:00 AM   ORAL CHEMOTHERAPY   Assessment Type Lab Monitoring Refill Monthly Follow up Refill Refill Monthly Follow up Other   Diagnosis Code Hepatocellular Cancer Hepatocellular Cancer Hepatocellular Cancer Hepatocellular Cancer Hepatocellular Cancer Hepatocellular Cancer Hepatocellular Cancer   Providers Dr. Cherelle Villarreal   [facility]   Is this patient followed by the [facility] OC team? No No No No No No No   Drug Name Nexavar (sorafenib) Nexavar (sorafenib) Nexavar (sorafenib) Nexavar (sorafenib) Nexavar (sorafenib) Nexavar (sorafenib) Nexavar (sorafenib)   Dose 200 mg 200 mg 200 mg 200 mg 200 mg 200 mg 200 mg   Current Schedule BID BID BID BID BID BID BID   Cycle Details Continuous Continuous Continuous Continuous Continuous Continuous Continuous   Planned next cycle start date   9/10/2024       Doses missed in last 2 weeks   0    0   Adherence Assessment   Adherent    Adherent   Adverse Effects   Diarrhea;Fatigue;Palmar-plantar Erythrodysethesia Syndrome;Thrombocytopenia;Other (See Note for  "Details)    Diarrhea   Diarrhea   Grade 2       Pharmacist Intervention(diarrhea)   Yes       Intervention(s)   Patient education       Palmar-plantar Erythrodysethesia syndrome[hand-foot syndrome]   Grade 1       Pharmacist Intervention(Palmar-plantar)   No       Fatigue   Grade 2       Pharmacist Intervention(fatigue)   Yes       Intervention(s)   Patient education       Thrombocytopenia   Grade 2       Pharmacist Intervention(thrombocytopenia)   Yes       Intervention(s)   Patient education;Referral to oncology provider       Other (See Note for Details)   abdominal pain       Pharmacist intervention(other)   No           Vitals:  BP:   BP Readings from Last 1 Encounters:   09/09/24 (!) 153/71     Wt Readings from Last 1 Encounters:   09/09/24 75.6 kg (166 lb 9.6 oz)     Estimated body surface area is 1.92 meters squared as calculated from the following:    Height as of 8/8/24: 1.753 m (5' 9.02\").    Weight as of 9/9/24: 75.6 kg (166 lb 9.6 oz).    Labs:  No results found for NA within last 30 days.     No results found for K within last 30 days.     No results found for CA within last 30 days.     No results found for Mag within last 30 days.     No results found for Phos within last 30 days.     No results found for ALBUMIN within last 30 days.     No results found for BUN within last 30 days.     No results found for CR within last 30 days.       No results found for AST within last 30 days.     No results found for ALT within last 30 days.     No results found for BILITOTAL within last 30 days.       No results found for WBC within last 30 days.     No results found for HGB within last 30 days.     No results found for PLT within last 30 days.     No results found for ANC within last 30 days.     Assessment/Plan:  Continue     Follow-Up:  Continue same sorafenib dose as ordered  Continue with the same loperamide dose and may change to lomotil if needed.  Call if he needs to be seen by an SERA for his diarrhea. "  Continue with keeping up with his hydration needs.    Curtis Butler PharmD, BCOP  10/14/2024

## 2024-10-23 ENCOUNTER — LAB (OUTPATIENT)
Dept: LAB | Facility: CLINIC | Age: 59
End: 2024-10-23
Payer: MEDICARE

## 2024-10-23 DIAGNOSIS — C78.6 MALIGNANT NEOPLASM METASTATIC TO PERITONEUM (H): ICD-10-CM

## 2024-10-23 DIAGNOSIS — C22.0 HCC (HEPATOCELLULAR CARCINOMA) (H): ICD-10-CM

## 2024-10-23 DIAGNOSIS — Z94.4 LIVER REPLACED BY TRANSPLANT (H): ICD-10-CM

## 2024-10-23 LAB
ALBUMIN SERPL BCG-MCNC: 4.4 G/DL (ref 3.5–5.2)
ALP SERPL-CCNC: 103 U/L (ref 40–150)
ALT SERPL W P-5'-P-CCNC: 24 U/L (ref 0–70)
ANION GAP SERPL CALCULATED.3IONS-SCNC: 13 MMOL/L (ref 7–15)
AST SERPL W P-5'-P-CCNC: 27 U/L (ref 0–45)
BASOPHILS # BLD AUTO: 0 10E3/UL (ref 0–0.2)
BASOPHILS NFR BLD AUTO: 1 %
BILIRUB DIRECT SERPL-MCNC: <0.2 MG/DL (ref 0–0.3)
BILIRUB SERPL-MCNC: 0.7 MG/DL
BUN SERPL-MCNC: 24.7 MG/DL (ref 8–23)
CALCIUM SERPL-MCNC: 7.5 MG/DL (ref 8.8–10.4)
CHLORIDE SERPL-SCNC: 103 MMOL/L (ref 98–107)
CREAT SERPL-MCNC: 1.52 MG/DL (ref 0.67–1.17)
EGFRCR SERPLBLD CKD-EPI 2021: 52 ML/MIN/1.73M2
EOSINOPHIL # BLD AUTO: 0.1 10E3/UL (ref 0–0.7)
EOSINOPHIL NFR BLD AUTO: 2 %
ERYTHROCYTE [DISTWIDTH] IN BLOOD BY AUTOMATED COUNT: 16.1 % (ref 10–15)
GLUCOSE SERPL-MCNC: 130 MG/DL (ref 70–99)
HCO3 SERPL-SCNC: 23 MMOL/L (ref 22–29)
HCT VFR BLD AUTO: 36.1 % (ref 40–53)
HGB BLD-MCNC: 11.3 G/DL (ref 13.3–17.7)
IMM GRANULOCYTES # BLD: 0.1 10E3/UL
IMM GRANULOCYTES NFR BLD: 1 %
LYMPHOCYTES # BLD AUTO: 0.9 10E3/UL (ref 0.8–5.3)
LYMPHOCYTES NFR BLD AUTO: 14 %
MAGNESIUM SERPL-MCNC: 1.7 MG/DL (ref 1.7–2.3)
MCH RBC QN AUTO: 29.7 PG (ref 26.5–33)
MCHC RBC AUTO-ENTMCNC: 31.3 G/DL (ref 31.5–36.5)
MCV RBC AUTO: 95 FL (ref 78–100)
MONOCYTES # BLD AUTO: 0.3 10E3/UL (ref 0–1.3)
MONOCYTES NFR BLD AUTO: 6 %
NEUTROPHILS # BLD AUTO: 4.8 10E3/UL (ref 1.6–8.3)
NEUTROPHILS NFR BLD AUTO: 77 %
NRBC # BLD AUTO: 0 10E3/UL
NRBC BLD AUTO-RTO: 0 /100
PHOSPHATE SERPL-MCNC: 4.1 MG/DL (ref 2.5–4.5)
PLATELET # BLD AUTO: 152 10E3/UL (ref 150–450)
POTASSIUM SERPL-SCNC: 4.5 MMOL/L (ref 3.4–5.3)
PROT SERPL-MCNC: 7.4 G/DL (ref 6.4–8.3)
RBC # BLD AUTO: 3.8 10E6/UL (ref 4.4–5.9)
SODIUM SERPL-SCNC: 139 MMOL/L (ref 135–145)
WBC # BLD AUTO: 6.2 10E3/UL (ref 4–11)

## 2024-10-23 PROCEDURE — 84100 ASSAY OF PHOSPHORUS: CPT | Performed by: PATHOLOGY

## 2024-10-23 PROCEDURE — 85025 COMPLETE CBC W/AUTO DIFF WBC: CPT | Performed by: PATHOLOGY

## 2024-10-23 PROCEDURE — 83735 ASSAY OF MAGNESIUM: CPT | Performed by: PATHOLOGY

## 2024-10-23 PROCEDURE — 82248 BILIRUBIN DIRECT: CPT | Performed by: PATHOLOGY

## 2024-10-23 PROCEDURE — 36415 COLL VENOUS BLD VENIPUNCTURE: CPT | Performed by: PATHOLOGY

## 2024-10-23 PROCEDURE — 80053 COMPREHEN METABOLIC PANEL: CPT | Performed by: PATHOLOGY

## 2024-10-25 ENCOUNTER — TELEPHONE (OUTPATIENT)
Dept: ONCOLOGY | Facility: CLINIC | Age: 59
End: 2024-10-25
Payer: MEDICARE

## 2024-10-25 DIAGNOSIS — C22.0 HCC (HEPATOCELLULAR CARCINOMA) (H): Primary | ICD-10-CM

## 2024-10-25 DIAGNOSIS — Z94.4 STATUS POST LIVER TRANSPLANTATION (H): ICD-10-CM

## 2024-10-25 RX ORDER — DIPHENOXYLATE HYDROCHLORIDE AND ATROPINE SULFATE 2.5; .025 MG/1; MG/1
1 TABLET ORAL 4 TIMES DAILY PRN
Qty: 20 TABLET | Refills: 0 | Status: SHIPPED | OUTPATIENT
Start: 2024-10-25 | End: 2024-10-29

## 2024-10-25 NOTE — TELEPHONE ENCOUNTER
Oral Chemotherapy Monitoring Program and lab review     Subjective/Objective:  Frandy Workman is a 59 year old male contacted by phone for a follow-up visit for oral chemotherapy.  Miller states that things are going ok today.  His diarrhea was worse over the week but has calmed down the last 2 days.  He has not got his Rx's for lomotil yet.  New rx was sent to Dr Villarreal for signature and release.  Overall he is doing well.        8/6/2024     9:00 AM 8/19/2024     3:00 PM 9/10/2024     9:00 AM 10/7/2024     8:00 AM 10/8/2024     2:00 PM 10/14/2024    11:00 AM 10/25/2024     3:00 PM   ORAL CHEMOTHERAPY   Assessment Type Refill Monthly Follow up Refill Refill Monthly Follow up Other Other   Diagnosis Code Hepatocellular Cancer Hepatocellular Cancer Hepatocellular Cancer Hepatocellular Cancer Hepatocellular Cancer Hepatocellular Cancer Hepatocellular Cancer   Providers Dr. Cherelle Villarreal   [facility]   Is this patient followed by the [facility] OC team? No No No No No No No   Drug Name Nexavar (sorafenib) Nexavar (sorafenib) Nexavar (sorafenib) Nexavar (sorafenib) Nexavar (sorafenib) Nexavar (sorafenib) Nexavar (sorafenib)   Dose 200 mg 200 mg 200 mg 200 mg 200 mg 200 mg 200 mg   Current Schedule BID BID BID BID BID BID BID   Cycle Details Continuous Continuous Continuous Continuous Continuous Continuous Continuous   Planned next cycle start date  9/10/2024        Doses missed in last 2 weeks  0    0 0   Adherence Assessment  Adherent    Adherent    Adverse Effects  Diarrhea;Fatigue;Palmar-plantar Erythrodysethesia Syndrome;Thrombocytopenia;Other (See Note for Details)    Diarrhea    Diarrhea  Grade 2        Pharmacist Intervention(diarrhea)  Yes        Intervention(s)  Patient education        Palmar-plantar Erythrodysethesia syndrome[hand-foot syndrome]  Grade 1        Pharmacist  "Intervention(Palmar-plantar)  No        Fatigue  Grade 2        Pharmacist Intervention(fatigue)  Yes        Intervention(s)  Patient education        Thrombocytopenia  Grade 2        Pharmacist Intervention(thrombocytopenia)  Yes        Intervention(s)  Patient education;Referral to oncology provider        Other (See Note for Details)  abdominal pain        Pharmacist intervention(other)  No            Vitals:  BP:   BP Readings from Last 1 Encounters:   09/09/24 (!) 153/71     Wt Readings from Last 1 Encounters:   09/09/24 75.6 kg (166 lb 9.6 oz)     Estimated body surface area is 1.92 meters squared as calculated from the following:    Height as of 8/8/24: 1.753 m (5' 9.02\").    Weight as of 9/9/24: 75.6 kg (166 lb 9.6 oz).    Labs:  _  Result Component Current Result Ref Range   Sodium 139 (10/23/2024) 135 - 145 mmol/L     _  Result Component Current Result Ref Range   Potassium 4.5 (10/23/2024) 3.4 - 5.3 mmol/L     _  Result Component Current Result Ref Range   Calcium 7.5 (L) (10/23/2024) 8.8 - 10.4 mg/dL     _  Result Component Current Result Ref Range   Magnesium 1.7 (10/23/2024) 1.7 - 2.3 mg/dL     _  Result Component Current Result Ref Range   Phosphorus 4.1 (10/23/2024) 2.5 - 4.5 mg/dL     _  Result Component Current Result Ref Range   Albumin 4.4 (10/23/2024) 3.5 - 5.2 g/dL     _  Result Component Current Result Ref Range   Urea Nitrogen 24.7 (H) (10/23/2024) 8.0 - 23.0 mg/dL     _  Result Component Current Result Ref Range   Creatinine 1.52 (H) (10/23/2024) 0.67 - 1.17 mg/dL       _  Result Component Current Result Ref Range   AST 27 (10/23/2024) 0 - 45 U/L     _  Result Component Current Result Ref Range   ALT 24 (10/23/2024) 0 - 70 U/L     _  Result Component Current Result Ref Range   Bilirubin Total 0.7 (10/23/2024) <=1.2 mg/dL       _  Result Component Current Result Ref Range   WBC Count 6.2 (10/23/2024) 4.0 - 11.0 10e3/uL     _  Result Component Current Result Ref Range   Hemoglobin 11.3 (L) " (10/23/2024) 13.3 - 17.7 g/dL     _  Result Component Current Result Ref Range   Platelet Count 152 (10/23/2024) 150 - 450 10e3/uL     _  Result Component Current Result Ref Range   Absolute Neutrophils 4.8 (10/23/2024) 1.6 - 8.3 10e3/uL     Assessment/Plan:  Continue same dose of nexavar as ordered.  Start Lomotil to see if diarrhea improves.    Follow-Up:  1 week.    Curtis Butler PharmD, BCOP  10/25/2024

## 2024-10-29 ENCOUNTER — TELEPHONE (OUTPATIENT)
Dept: ONCOLOGY | Facility: CLINIC | Age: 59
End: 2024-10-29
Payer: MEDICARE

## 2024-10-29 DIAGNOSIS — Z94.4 STATUS POST LIVER TRANSPLANTATION (H): ICD-10-CM

## 2024-10-29 DIAGNOSIS — C22.0 HCC (HEPATOCELLULAR CARCINOMA) (H): ICD-10-CM

## 2024-10-29 RX ORDER — DIPHENOXYLATE HYDROCHLORIDE AND ATROPINE SULFATE 2.5; .025 MG/1; MG/1
1 TABLET ORAL 4 TIMES DAILY PRN
Qty: 20 TABLET | Refills: 0 | Status: SHIPPED | OUTPATIENT
Start: 2024-10-29

## 2024-10-29 NOTE — ORAL ONC MGMT
Miller called to check status of lomotil, rx sent 10/25.   Pharmacy will check in later this week to see if it helped, will  from [facility] [place].    Courtney Panda, PharmD, [facility]  Oral Chemotherapy Monitoring Program  [facility]  [phone]

## 2024-11-01 ENCOUNTER — TELEPHONE (OUTPATIENT)
Dept: ONCOLOGY | Facility: CLINIC | Age: 59
End: 2024-11-01
Payer: MEDICARE

## 2024-11-01 NOTE — ORAL ONC MGMT
Called to see if lomotil has helped with diarrhea. Left voicemail no drug names were mentioned. Will update when call back received.    Courtney Panda, PharmD, [facility]  Oral Chemotherapy Monitoring Program  [facility]  [phone]

## 2024-11-04 ENCOUNTER — TELEPHONE (OUTPATIENT)
Dept: TRANSPLANT | Facility: CLINIC | Age: 59
End: 2024-11-04
Payer: MEDICARE

## 2024-11-04 ENCOUNTER — TELEPHONE (OUTPATIENT)
Dept: ONCOLOGY | Facility: CLINIC | Age: 59
End: 2024-11-04
Payer: MEDICARE

## 2024-11-04 DIAGNOSIS — Z94.4 LIVER REPLACED BY TRANSPLANT (H): Primary | ICD-10-CM

## 2024-11-04 DIAGNOSIS — C78.6 MALIGNANT NEOPLASM METASTATIC TO PERITONEUM (H): ICD-10-CM

## 2024-11-04 DIAGNOSIS — C22.0 HCC (HEPATOCELLULAR CARCINOMA) (H): Primary | ICD-10-CM

## 2024-11-04 DIAGNOSIS — Z94.4 STATUS POST LIVER TRANSPLANTATION (H): ICD-10-CM

## 2024-11-04 RX ORDER — DIPHENOXYLATE HYDROCHLORIDE AND ATROPINE SULFATE 2.5; .025 MG/1; MG/1
1 TABLET ORAL 4 TIMES DAILY PRN
Qty: 60 TABLET | Refills: 0 | Status: SHIPPED | OUTPATIENT
Start: 2024-11-04

## 2024-11-04 NOTE — LETTER
OUTPATIENT LABORATORY TEST ORDER   Patient copy/reminder     Patient Name: Frandy Workman     YOB: 1965       [facility] MR# [if applicable]: [id number]   Date & Time:November 2024  Expiration Date: 1 year after date issued      Diagnosis:      Liver Transplant (ICD-10 Z94.4)   Aftercare following organ transplant (ICD-10 Z48.288)   Long term use of medications (ICD-10 Z79.899)      We ask your assistance in obtaining the following laboratory tests, which are part of our routine surveillance program for Solid Organ Transplant patients.      Please fax each result to [phone], same day as resulted/available    Critical lab results page [phone]          >1-year post-transplant  Every 2-3 months and as needed  CBC with Platelets   Basic Metabolic Panel   Hepatic panel   Magnesium     >1-year post-transplant  Labs annually due August 2024   Fasting Lipid Panel  Urine protein/creatinine ratio  UA with reflex to micro  Phosphorous    If you have any questions, please call [facility]- [phone] or [phone], Fax- [phone].      Cecilia Amaya MD   of Medicine  Division of Gastroenterology, Hepatology, and Nutrition  [facility]

## 2024-11-04 NOTE — ORAL ONC MGMT
"Miller returned my call for checking in on diarrhea. Lomotil \"knocked him out\" initially but his current plan for diarrhea has been manageable: taking 2 tabs of lomotil before bed and this has worked to get him through the night as diarrhea had been an issue around 2am, taking loperamide once in the morning and then sometimes repeating a dose in the afternoon PRN, this is much less usage than before.    Has 3 day supply of lomotil on hand, requesting more. Will send order to Dr Villarreal for another fill, requests sent to [facility] so he can get it in time.    Weight - has actually gained weight but feels that he is eating less (this am 169 pounds, was done to 164 previously). Was able to eat sushi last night and didn't feel unwell, slept great through the night as well.    No other concerns at this time.    Courteny Panda, PharmD, [facility]  Oral Chemotherapy Monitoring Program  [facility]  [phone]    "
n/a

## 2024-11-04 NOTE — TELEPHONE ENCOUNTER
Spoke to pt and discussed Dr. Banuelos's resignation and the new hepatologist pt was assigned to. Pt was satisfied with the conversation.

## 2024-11-04 NOTE — TELEPHONE ENCOUNTER
Cristo called regarding a cancelled appt with his Liver provider and the being re scheduled with a different provider wondering if Dr. Banuelos seeing Pt's?

## 2024-11-05 ENCOUNTER — TELEPHONE (OUTPATIENT)
Dept: GASTROENTEROLOGY | Facility: CLINIC | Age: 59
End: 2024-11-05
Payer: MEDICARE

## 2024-11-05 ENCOUNTER — TELEPHONE (OUTPATIENT)
Dept: TRANSPLANT | Facility: CLINIC | Age: 59
End: 2024-11-05
Payer: MEDICARE

## 2024-11-05 DIAGNOSIS — I82.452 ACUTE DEEP VEIN THROMBOSIS (DVT) OF LEFT PERONEAL VEIN (H): ICD-10-CM

## 2024-11-05 DIAGNOSIS — E83.42 HYPOMAGNESEMIA: ICD-10-CM

## 2024-11-05 NOTE — TELEPHONE ENCOUNTER
Magnesium oxide and eliquis refill   Last prescribing provider: Dr Villarreal     Last clinic visit date: 9/9/24 Dr Villarreal     Recommendations for requested medication (if none, N/A): Magnesium oxide - N/A    4. Large spontaneous ecchymosis/hematoma in his right breast. He is on Eliquis for his prior DVT. He has had a stable dose of the Eliquis for quite some time I do not see any other obvious change in his status to explain this. I would like to check his coags to be sure he has not not developed some other coagulopathy that is contributing to this. If we do not find any other explicit explanation we will probably need to decrease his dose if he has further spontaneous ecchymoses.     Any other pertinent information (if none, N/A): N/A    Refilled: Y/N, if NO, why?

## 2024-11-06 DIAGNOSIS — E83.39 HYPOPHOSPHATEMIA: ICD-10-CM

## 2024-11-06 DIAGNOSIS — C78.6 MALIGNANT NEOPLASM METASTATIC TO PERITONEUM (H): ICD-10-CM

## 2024-11-06 DIAGNOSIS — C22.0 HCC (HEPATOCELLULAR CARCINOMA) (H): Primary | ICD-10-CM

## 2024-11-06 RX ORDER — APIXABAN 5 MG/1
5 TABLET, FILM COATED ORAL 2 TIMES DAILY
Qty: 60 TABLET | Refills: 3 | Status: SHIPPED | OUTPATIENT
Start: 2024-11-06

## 2024-11-06 RX ORDER — SORAFENIB 200 MG/1
200 TABLET, FILM COATED ORAL 2 TIMES DAILY
Qty: 60 TABLET | Refills: 0 | Status: SHIPPED | OUTPATIENT
Start: 2024-11-09

## 2024-11-06 RX ORDER — LANOLIN ALCOHOL/MO/W.PET/CERES
400 CREAM (GRAM) TOPICAL DAILY
Qty: 30 TABLET | Refills: 5 | Status: SHIPPED | OUTPATIENT
Start: 2024-11-06

## 2024-11-06 RX ORDER — DIBASIC SODIUM PHOSPHATE, MONOBASIC POTASSIUM PHOSPHATE AND MONOBASIC SODIUM PHOSPHATE 852; 155; 130 MG/1; MG/1; MG/1
250 TABLET ORAL 4 TIMES DAILY
Qty: 120 TABLET | Refills: 1 | Status: SHIPPED | OUTPATIENT
Start: 2024-11-06

## 2024-11-06 NOTE — TELEPHONE ENCOUNTER
Phosphorus tablet 250mg  Last prescribing provider: Dr. Miguel Villarreal    Last clinic visit date: 9/9/24    Recommendations for requested medication (if none, N/A): NA    Any other pertinent information (if none, N/A): NA    Refilled: Y/N, if NO, why?

## 2024-11-11 ENCOUNTER — OFFICE VISIT (OUTPATIENT)
Dept: ORTHOPEDICS | Facility: CLINIC | Age: 59
End: 2024-11-11
Payer: MEDICARE

## 2024-11-11 DIAGNOSIS — E11.3293 TYPE 2 DIABETES MELLITUS WITH MILD NONPROLIFERATIVE RETINOPATHY OF BOTH EYES WITHOUT MACULAR EDEMA, UNSPECIFIED WHETHER LONG TERM INSULIN USE (H): ICD-10-CM

## 2024-11-11 DIAGNOSIS — M79.671 PAIN IN BOTH FEET: ICD-10-CM

## 2024-11-11 DIAGNOSIS — E11.69 TYPE 2 DIABETES MELLITUS WITH DIABETIC FOOT DEFORMITY (H): ICD-10-CM

## 2024-11-11 DIAGNOSIS — E11.49 TYPE II OR UNSPECIFIED TYPE DIABETES MELLITUS WITH NEUROLOGICAL MANIFESTATIONS, NOT STATED AS UNCONTROLLED(250.60) (H): Primary | ICD-10-CM

## 2024-11-11 DIAGNOSIS — E11.628 TYPE 2 DIABETES MELLITUS WITH PRESSURE CALLUS (H): ICD-10-CM

## 2024-11-11 DIAGNOSIS — M79.672 PAIN IN BOTH FEET: ICD-10-CM

## 2024-11-11 DIAGNOSIS — L84 TYPE 2 DIABETES MELLITUS WITH PRESSURE CALLUS (H): ICD-10-CM

## 2024-11-11 DIAGNOSIS — M21.969 TYPE 2 DIABETES MELLITUS WITH DIABETIC FOOT DEFORMITY (H): ICD-10-CM

## 2024-11-11 DIAGNOSIS — L60.2 ONYCHAUXIS: ICD-10-CM

## 2024-11-11 PROCEDURE — 11057 PARNG/CUTG B9 HYPRKR LES >4: CPT | Performed by: PODIATRIST

## 2024-11-11 PROCEDURE — 99213 OFFICE O/P EST LOW 20 MIN: CPT | Mod: 25 | Performed by: PODIATRIST

## 2024-11-11 PROCEDURE — 11719 TRIM NAIL(S) ANY NUMBER: CPT | Performed by: PODIATRIST

## 2024-11-11 RX ORDER — INSULIN DEGLUDEC 100 U/ML
INJECTION, SOLUTION SUBCUTANEOUS
Qty: 15 ML | Refills: 1 | Status: SHIPPED | OUTPATIENT
Start: 2024-11-11

## 2024-11-11 NOTE — PROGRESS NOTES
Past Medical History:   Diagnosis Date    Anemia 2013    Arthritis     BPH (benign prostatic hyperplasia)     CAD (coronary artery disease) 04/01/2019    Cholelithiasis     Conductive hearing loss 08/16/2017    Depressive disorder 1986    Suffer effects throughout life    Gastroesophageal reflux disease 12/01/2014    HCC (hepatocellular carcinoma) (H) 01/22/2019    History of blood transfusion 2019    Had blood transfussion until Dec 2022    History of diabetic retinopathy 07/2018    HTN (hypertension) 11/20/2019    Hyperlipidemia     Liver cirrhosis secondary to ESTRADA (H)     Liver transplanted (H) 11/11/2019    Portal vein thrombosis 04/11/2019    SCC (squamous cell carcinoma) 02/15/2023    02/15/23:  Left temporal scalp    Type II diabetes mellitus (H)      Patient Active Problem List   Diagnosis    Bipolar affective disorder in remission (H)    Esophageal varices determined by endoscopy (H)    Erectile dysfunction due to diseases classified elsewhere    HCC (hepatocellular carcinoma) (H)    Equivocal stress echocardiogram    Type 2 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, unspecified whether long term insulin use (H)    Status post coronary angiogram    CAD (coronary artery disease)    Liver transplant recipient (H)    Status post liver transplantation (H)    Immunosuppressed status (H)    Benign essential hypertension    Anemia of chronic renal failure, stage 3a (H)    History of coronary artery disease    Dyslipidemia    Excessive sweating    Stage 3b chronic kidney disease (H)    Anemia of chronic renal failure, stage 3b (H)    NSTEMI (non-ST elevated myocardial infarction) (H)    Chest pain, unspecified type    Hypertensive chronic kidney disease with stage 5 chronic kidney disease or end stage renal disease (H)    Vitreous hemorrhage of left eye (H)    Proliferative diabetic retinopathy of both eyes associated with type 2 diabetes mellitus, unspecified  proliferative retinopathy type (H)    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)    Liver replaced by transplant (H)    Hyperkalemia    Acute renal failure, unspecified acute renal failure type (H)    ARIELA (obstructive sleep apnea)    History of nonmelanoma skin cancer    Neoplasm of unspecified behavior of bone, soft tissue, and skin    SCC (squamous cell carcinoma)    AK (actinic keratosis)     Past Surgical History:   Procedure Laterality Date    ABDOMEN SURGERY  11/11/2019    Had Liver Transplant    BIOPSY  12/2022    Had biopsy done will have additional procedure 2/15/2023    BYPASS GRAFT ARTERY CORONARY N/A 07/14/2021    Procedure: median sternotomy, on cardiopulmonary bypass, CORONARY ARTERY BYPASS GRAFT (CABG) x2 with left greater saphenous vein endoscopic harvest and left internal mammery artery harvest;  Surgeon: Tom Zapata MD;  Location: [facility] OR    CARDIAC SURGERY  7/2021    Heart Graph. and bypass surgery    CHOLECYSTECTOMY  11/11/2022    Removed with Liver Transplant    COLONOSCOPY      2015    COLONOSCOPY N/A 12/06/2019    Procedure: COLONOSCOPY, WITH POLYPECTOMY AND BIOPSY;  Surgeon: Adam Morton MD;  Location:  GI    CV CENTRAL VENOUS CATHETER PLACEMENT N/A 07/12/2021    Procedure: Central Venous Catheter Placement;  Surgeon: Fermin Polanco MD;  Location:  HEART CARDIAC CATH LAB    CV CORONARY ANGIOGRAM N/A 07/12/2021    Procedure: Coronary Angiogram;  Surgeon: Fermin Polanco MD;  Location:  HEART CARDIAC CATH LAB    CV HEART CATHETERIZATION WITH POSSIBLE INTERVENTION N/A 02/26/2019    Procedure: CORS;  Surgeon: Jagdish Hoyt MD;  Location:  HEART CARDIAC CATH LAB    CV INTRA AORTIC BALLOON N/A 07/12/2021    Procedure: Intra Aortic Balloon Pump Insertion;  Surgeon: Fermin Polanco MD;  Location: [facility] CARDIAC CATH LAB    ESOPHAGOSCOPY, GASTROSCOPY, DUODENOSCOPY (EGD), COMBINED N/A 11/17/2016    Procedure: COMBINED  ESOPHAGOSCOPY, GASTROSCOPY, DUODENOSCOPY (EGD);  Surgeon: Santi Rosas MD;  Location: [facility] GI    ESOPHAGOSCOPY, GASTROSCOPY, DUODENOSCOPY (EGD), COMBINED N/A 11/17/2017    Procedure: COMBINED ESOPHAGOSCOPY, GASTROSCOPY, DUODENOSCOPY (EGD);  EGD;  Surgeon: Santi Rosas MD;  Location: [facility] GI    ESOPHAGOSCOPY, GASTROSCOPY, DUODENOSCOPY (EGD), COMBINED N/A 12/28/2018    Procedure: EGD;  Surgeon: Santi Rosas MD;  [facility]    ESOPHAGOSCOPY, GASTROSCOPY, DUODENOSCOPY (EGD), COMBINED N/A 12/06/2019    Procedure: ESOPHAGOGASTRODUODENOSCOPY, WITH BIOPSY;  Surgeon: Adam Morton MD;  Location: [facility] GI    ESOPHAGOSCOPY, GASTROSCOPY, DUODENOSCOPY (EGD), COMBINED N/A 02/13/2020    Procedure: ESOPHAGOGASTRODUODENOSCOPY (EGD);  Surgeon: Santi Rosas MD;  Location:  GI    EXCISE MASS ABDOMEN N/A 07/13/2023    Procedure: Laparoscopic lysis of adhesions, laparoscopic resection of abdominal mass;  Surgeon: Ruiz Chu MD;  Location:  OR    HEAD & NECK SURGERY      12/2017 at [facility].     IMPLANT GOLD WEIGHT EYELID Right 11/16/2017    Procedure: IMPLANT WEIGHT EYELID;  Right Upper Eyelid Weight, right tarsal strip lower eyelid;  Surgeon: Milana Malave MD;  [facility]    IR CHEMO EMBOLIZATION  01/22/2019    KNEE SURGERY Left     ORTHOPEDIC SURGERY      PAROTIDECTOMY, RADICAL NECK DISSECTION Right 11/02/2017    Procedure: PAROTIDECTOMY, RADICAL NECK DISSECTION;  Right Superfacial Parotidectomy , Facial nerve repair. with [facility] facial nerve monitor.;  Surgeon: Asiya Morgan MD;  Location: [facility] OR    PHACOEMULSIFICATION CLEAR CORNEA WITH STANDARD INTRAOCULAR LENS IMPLANT Right 01/24/2023    Procedure: PHACOEMULSIFICATION, COMPLEX CATARACT, WITH INTRAOCULAR LENS IMPLANT WITH TRYPAN RIGHT EYE;  Surgeon: Enriqueta Martin MD;  Location: [facility] OR    PHACOEMULSIFICATION WITH STANDARD INTRAOCULAR LENS IMPLANT Left 01/03/2023    Procedure: PHACOEMULSIFICATION, COMPLEX  CATARACT, WITH STANDARD INTRAOCULAR LENS IMPLANT INSERTION LEFT EYE;  Surgeon: Enriqueta Martin MD;  Location: [facility] OR    PICC INSERTION Left 2017    4fr SL BioFlo PICC, 44cm in the L basilic vein w/ tip in the low SVC    RETURN LIVER TRANSPLANT N/A 2019    Procedure: Exploratory laparotomy, hematoma evacuation, abdominal washout;  Surgeon: Александр Ramos MD;  Location: [facility] OR    TRANSPLANT LIVER RECIPIENT  DONOR N/A 2019    Procedure: TRANSPLANT, LIVER, RECIPIENT,  DONOR;  Surgeon: Александр Ramos MD;  Location: [facility] OR    VASCULAR SURGERY       Social History     Socioeconomic History    Marital status:      Spouse name: Not on file    Number of children: Not on file    Years of education: Not on file    Highest education level: Bachelor's degree (e.g., BA, AB, BS)   Occupational History    Not on file   Tobacco Use    Smoking status: Former     Types: Dip, chew, snus or snuff     Passive exposure: Past    Smokeless tobacco: Former     Types: Chew     Quit date: 10/31/2017    Tobacco comments:     Quit [place] after Doctor told me in 2017 that if I didn't I would   Vaping Use    Vaping status: Never Used   Substance and Sexual Activity    Alcohol use: No     Comment: quit 1996    Drug use: No    Sexual activity: Not Currently     Partners: Female     Birth control/protection: Condom   Other Topics Concern    Parent/sibling w/ CABG, MI or angioplasty before 65F 55M? No   Social History Narrative    Prior , [place]     Social Drivers of Health     Financial Resource Strain: Low Risk  (2024)    Financial Resource Strain     Within the past 12 months, have you or your family members you live with been unable to get utilities (heat, electricity) when it was really needed?: No   Food Insecurity: Low Risk  (2024)    Food Insecurity     Within the past 12 months, did you worry that your food would run out before you got money to buy more?:  No     Within the past 12 months, did the food you bought just not last and you didn t have money to get more?: No   Transportation Needs: Low Risk  (2024)    Transportation Needs     Within the past 12 months, has lack of transportation kept you from medical appointments, getting your medicines, non-medical meetings or appointments, work, or from getting things that you need?: No   Physical Activity: Insufficiently Active (10/13/2020)    Exercise Vital Sign     Days of Exercise per Week: 1 day     Minutes of Exercise per Session: 60 min   Stress: Stress Concern Present (10/13/2020)    [employer] [place] [employer] - Occupational Stress Questionnaire     Feeling of Stress : To some extent   Social Connections: Socially Isolated (10/13/2020)    Social Connection and Isolation Panel [NHANES]     Frequency of Communication with Friends and Family: Once a week     Frequency of Social Gatherings with Friends and Family: Once a week     Attends Nondenominational Services: Never     Active Member of Clubs or Organizations: No     Attends Club or Organization Meetings: Never     Marital Status:    Interpersonal Safety: Low Risk  (2024)    Interpersonal Safety     Do you feel physically and emotionally safe where you currently live?: Yes     Within the past 12 months, have you been hit, slapped, kicked or otherwise physically hurt by someone?: No     Within the past 12 months, have you been humiliated or emotionally abused in other ways by your partner or ex-partner?: No   Housing Stability: Low Risk  (2024)    Housing Stability     Do you have housing? : Yes     Are you worried about losing your housing?: No     Family History   Problem Relation Age of Onset    Skin Cancer Mother     Cancer Mother         mastectomy    Diabetes Mother          3/2016    Cerebrovascular Disease Mother         Passed away in Feb of this year, 80 years old.    Thyroid Disease Mother     Depression Mother      "Breast Cancer Mother     Obesity Mother         280 pounds  from this and complications from D    Pancreatic Cancer Father 60    Prostate Cancer Father         Currently in Remission    Macular Degeneration Father     Glaucoma Father     Skin Cancer Father     Coronary Artery Disease Father         Started in his 70's  at 88 in 2023    Colon Cancer Father     Thyroid Disease Sister     Depression Sister     Asthma Sister     Sleep Apnea Brother     Colorectal Cancer Maternal Grandmother     Cancer Maternal Grandmother     Substance Abuse Maternal Grandmother         Alcohol    Prostate Cancer Maternal Grandfather     Substance Abuse Maternal Grandfather         Alcohol    Colorectal Cancer Paternal Grandmother     Asthma Sister         Had since birth    Liver Disease No family hx of     Melanoma No family hx of     Anesthesia Reaction No family hx of     Deep Vein Thrombosis (DVT) No family hx of        Lab Results   Component Value Date    A1C 5.6 2024    A1C 5.6 2024    A1C 5.7 2023    A1C 6.3 2023    A1C 6.9 2022    A1C 6.0 01/10/2022    A1C 6.0 2020    A1C 6.3 2020    A1C 6.6 2018    A1C 6.5 2017    A1C 7.8 10/25/2016         Lab Results   Component Value Date    WBC 6.2 10/23/2024    WBC 4.4 2021     Lab Results   Component Value Date    RBC 3.80 10/23/2024    RBC 2.35 2021     Lab Results   Component Value Date    HGB 11.3 10/23/2024    HGB 7.3 2021     Lab Results   Component Value Date    HCT 36.1 10/23/2024    HCT 22.6 2021     No components found for: \"MCT\"  Lab Results   Component Value Date    MCV 95 10/23/2024    MCV 96 2021     Lab Results   Component Value Date    MCH 29.7 10/23/2024    MCH 31.1 2021     Lab Results   Component Value Date    MCHC 31.3 10/23/2024    MCHC 32.3 2021     Lab Results   Component Value Date    RDW 16.1 10/23/2024    RDW 15.1 2021     Lab Results   Component " Value Date     10/23/2024     06/28/2021     Last Comprehensive Metabolic Panel:  Lab Results   Component Value Date     10/23/2024    POTASSIUM 4.5 10/23/2024    CHLORIDE 103 10/23/2024    CO2 23 10/23/2024    ANIONGAP 13 10/23/2024     (H) 10/23/2024    BUN 24.7 (H) 10/23/2024    CR 1.52 (H) 10/23/2024    GFRESTIMATED 52 (L) 10/23/2024    DEBORAH 7.5 (L) 10/23/2024           Subjective findings- 59-year-old returns clinic for diabetic foot cares.  Relates his foot calluses have been hurting, he was walking 15,000 steps a day but now is walking every other day because of his cancer and the calluses hurt, relates he is wearing his diabetic shoes but feels he may need some new ones, relates he has numbness tingling and neuropathy in his feet, relates to no ulcers, no sores, no injuries, relates to using moisturizing lotion twice a day.    Objective findings- DP and PT are 2 out of 4 bilaterally.  Has mild peripheral edema bilaterally.  Has decreased hair growth bilaterally.  Has dorsal contracted digits 2 through 5 bilaterally.  Has nucleated hyperkeratotic tissue buildup plantar first and fifth MPJ right greater than left, has nucleated hyperkeratotic tissue buildup plantar right fourth MPJ with pain on palpation bilaterally.  There is no erythema, no drainage, no odor, no calor, no tendon voids bilaterally.  Has incurvated toenails bilaterally.    Assessment and plan- Onychauxis bilaterally.  Diabetes with peripheral neuropathy.  Tylomas causing pain bilaterally.  Foot deformities bilaterally.  Diagnosis and treatment options discussed with the patient.  Continue the moisturizing lotion.  All the toenails were reduced bilaterally upon consent.  Tylomas bilaterally 5 of them were sharp debrided with a 10 blade upon consent.  Prescription for new Diabetic shoes and insoles given and use discussed with the patient and he is given the phone number address orthotics prosthetics lab for these.   Previous notes reviewed.  Return to clinic and see me in 2 months.                                                                                                Moderate level of medical decision making.

## 2024-11-11 NOTE — TELEPHONE ENCOUNTER
Ernesto FlexTouch 100UNIT/ML SOPN       Last Written Prescription Date:  9-20-23  Last Fill Quantity: 45 ml,   # refills: 3  Last Office Visit : 5-13-24  Future Office visit:  11-18-24    Routing refill request to provider for review/approval because:  Insulin and insulin pump supplies - refilled per Endocrine clinic.

## 2024-11-11 NOTE — LETTER
11/11/2024      Frandy Workman  [address] [facility] [address]      Dear Colleague,    Thank you for referring your patient, Frandy Workman, to the [facility] [place]. Please see a copy of my visit note below.    Past Medical History:   Diagnosis Date    Anemia 2013    Arthritis     BPH (benign prostatic hyperplasia)     CAD (coronary artery disease) 04/01/2019    Cholelithiasis     Conductive hearing loss 08/16/2017    Depressive disorder 1986    Suffer effects throughout life    Gastroesophageal reflux disease 12/01/2014    HCC (hepatocellular carcinoma) (H) 01/22/2019    History of blood transfusion 2019    Had blood transfussion until Dec 2022    History of diabetic retinopathy 07/2018    HTN (hypertension) 11/20/2019    Hyperlipidemia     Liver cirrhosis secondary to ESTRADA (H)     Liver transplanted (H) 11/11/2019    Portal vein thrombosis 04/11/2019    SCC (squamous cell carcinoma) 02/15/2023    02/15/23:  Left temporal scalp    Type II diabetes mellitus (H)      Patient Active Problem List   Diagnosis    Bipolar affective disorder in remission (H)    Esophageal varices determined by endoscopy (H)    Erectile dysfunction due to diseases classified elsewhere    HCC (hepatocellular carcinoma) (H)    Equivocal stress echocardiogram    Type 2 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, unspecified whether long term insulin use (H)    Status post coronary angiogram    CAD (coronary artery disease)    Liver transplant recipient (H)    Status post liver transplantation (H)    Immunosuppressed status (H)    Benign essential hypertension    Anemia of chronic renal failure, stage 3a (H)    History of coronary artery disease    Dyslipidemia    Excessive sweating    Stage 3b chronic kidney disease (H)    Anemia of chronic renal failure, stage 3b (H)    NSTEMI (non-ST elevated myocardial infarction) (H)    Chest pain, unspecified type     Hypertensive chronic kidney disease with stage 5 chronic kidney disease or end stage renal disease (H)    Vitreous hemorrhage of left eye (H)    Proliferative diabetic retinopathy of both eyes associated with type 2 diabetes mellitus, unspecified proliferative retinopathy type (H)    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)    Liver replaced by transplant (H)    Hyperkalemia    Acute renal failure, unspecified acute renal failure type (H)    ARIELA (obstructive sleep apnea)    History of nonmelanoma skin cancer    Neoplasm of unspecified behavior of bone, soft tissue, and skin    SCC (squamous cell carcinoma)    AK (actinic keratosis)     Past Surgical History:   Procedure Laterality Date    ABDOMEN SURGERY  11/11/2019    Had Liver Transplant    BIOPSY  12/2022    Had biopsy done will have additional procedure 2/15/2023    BYPASS GRAFT ARTERY CORONARY N/A 07/14/2021    Procedure: median sternotomy, on cardiopulmonary bypass, CORONARY ARTERY BYPASS GRAFT (CABG) x2 with left greater saphenous vein endoscopic harvest and left internal mammery artery harvest;  Surgeon: Tom Zapata MD;  Location: [facility] OR    CARDIAC SURGERY  7/2021    Heart Graph. and bypass surgery    CHOLECYSTECTOMY  11/11/2022    Removed with Liver Transplant    COLONOSCOPY      2015    COLONOSCOPY N/A 12/06/2019    Procedure: COLONOSCOPY, WITH POLYPECTOMY AND BIOPSY;  Surgeon: Adam Morton MD;  Location:  GI    CV CENTRAL VENOUS CATHETER PLACEMENT N/A 07/12/2021    Procedure: Central Venous Catheter Placement;  Surgeon: Fermin Polanco MD;  Location: [facility] CARDIAC CATH LAB    CV CORONARY ANGIOGRAM N/A 07/12/2021    Procedure: Coronary Angiogram;  Surgeon: Fermin Polanco MD;  Location: [facility] CARDIAC CATH LAB    CV HEART CATHETERIZATION WITH POSSIBLE INTERVENTION N/A 02/26/2019    Procedure: CORS;  Surgeon: Jagdish Hoyt MD;  Location: [facility] CARDIAC CATH LAB    CV INTRA AORTIC BALLOON  N/A 07/12/2021    Procedure: Intra Aortic Balloon Pump Insertion;  Surgeon: Fermin Polanco MD;  Location:  HEART CARDIAC CATH LAB    ESOPHAGOSCOPY, GASTROSCOPY, DUODENOSCOPY (EGD), COMBINED N/A 11/17/2016    Procedure: COMBINED ESOPHAGOSCOPY, GASTROSCOPY, DUODENOSCOPY (EGD);  Surgeon: Santi Rosas MD;  Location:  GI    ESOPHAGOSCOPY, GASTROSCOPY, DUODENOSCOPY (EGD), COMBINED N/A 11/17/2017    Procedure: COMBINED ESOPHAGOSCOPY, GASTROSCOPY, DUODENOSCOPY (EGD);  EGD;  Surgeon: Santi Rosas MD;  Location:  GI    ESOPHAGOSCOPY, GASTROSCOPY, DUODENOSCOPY (EGD), COMBINED N/A 12/28/2018    Procedure: EGD;  Surgeon: Santi Rosas MD;  Location:  OR    ESOPHAGOSCOPY, GASTROSCOPY, DUODENOSCOPY (EGD), COMBINED N/A 12/06/2019    Procedure: ESOPHAGOGASTRODUODENOSCOPY, WITH BIOPSY;  Surgeon: Adam Morton MD;  Location:  GI    ESOPHAGOSCOPY, GASTROSCOPY, DUODENOSCOPY (EGD), COMBINED N/A 02/13/2020    Procedure: ESOPHAGOGASTRODUODENOSCOPY (EGD);  Surgeon: Santi Rosas MD;  Location:  GI    EXCISE MASS ABDOMEN N/A 07/13/2023    Procedure: Laparoscopic lysis of adhesions, laparoscopic resection of abdominal mass;  Surgeon: Ruiz Chu MD;  Location:  OR    HEAD & NECK SURGERY      12/2017 at [facility].     IMPLANT GOLD WEIGHT EYELID Right 11/16/2017    Procedure: IMPLANT WEIGHT EYELID;  Right Upper Eyelid Weight, right tarsal strip lower eyelid;  Surgeon: Milana Malave MD;  Location:  OR    IR CHEMO EMBOLIZATION  01/22/2019    KNEE SURGERY Left     ORTHOPEDIC SURGERY      PAROTIDECTOMY, RADICAL NECK DISSECTION Right 11/02/2017    Procedure: PAROTIDECTOMY, RADICAL NECK DISSECTION;  Right Superfacial Parotidectomy , Facial nerve repair. with [facility] facial nerve monitor.;  Surgeon: Asiya Morgan MD;  Location:  OR    PHACOEMULSIFICATION CLEAR CORNEA WITH STANDARD INTRAOCULAR LENS IMPLANT Right 01/24/2023    Procedure: PHACOEMULSIFICATION,  COMPLEX CATARACT, WITH INTRAOCULAR LENS IMPLANT WITH TRYPAN RIGHT EYE;  Surgeon: Enriqueta Martin MD;  Location: [facility] OR    PHACOEMULSIFICATION WITH STANDARD INTRAOCULAR LENS IMPLANT Left 2023    Procedure: PHACOEMULSIFICATION, COMPLEX CATARACT, WITH STANDARD INTRAOCULAR LENS IMPLANT INSERTION LEFT EYE;  Surgeon: Enriqueta Martin MD;  Location: [facility] OR    PICC INSERTION Left 2017    4fr SL BioFlo PICC, 44cm in the L basilic vein w/ tip in the low SVC    RETURN LIVER TRANSPLANT N/A 2019    Procedure: Exploratory laparotomy, hematoma evacuation, abdominal washout;  Surgeon: Александр Ramos MD;  Location: [facility] OR    TRANSPLANT LIVER RECIPIENT  DONOR N/A 2019    Procedure: TRANSPLANT, LIVER, RECIPIENT,  DONOR;  Surgeon: Александр Ramos MD;  Location: [facility] OR    VASCULAR SURGERY       Social History     Socioeconomic History    Marital status:      Spouse name: Not on file    Number of children: Not on file    Years of education: Not on file    Highest education level: Bachelor's degree (e.g., BA, AB, BS)   Occupational History    Not on file   Tobacco Use    Smoking status: Former     Types: Dip, chew, snus or snuff     Passive exposure: Past    Smokeless tobacco: Former     Types: Chew     Quit date: 10/31/2017    Tobacco comments:     Quit [place] after Doctor told me in 2017 that if I didn't I would   Vaping Use    Vaping status: Never Used   Substance and Sexual Activity    Alcohol use: No     Comment: quit 1996    Drug use: No    Sexual activity: Not Currently     Partners: Female     Birth control/protection: Condom   Other Topics Concern    Parent/sibling w/ CABG, MI or angioplasty before 65F 55M? No   Social History Narrative    Prior , [place]     Social Drivers of Health     Financial Resource Strain: Low Risk  (2024)    Financial Resource Strain     Within the past 12 months, have you or your family members you live with  been unable to get utilities (heat, electricity) when it was really needed?: No   Food Insecurity: Low Risk  (1/23/2024)    Food Insecurity     Within the past 12 months, did you worry that your food would run out before you got money to buy more?: No     Within the past 12 months, did the food you bought just not last and you didn t have money to get more?: No   Transportation Needs: Low Risk  (1/23/2024)    Transportation Needs     Within the past 12 months, has lack of transportation kept you from medical appointments, getting your medicines, non-medical meetings or appointments, work, or from getting things that you need?: No   Physical Activity: Insufficiently Active (10/13/2020)    Exercise Vital Sign     Days of Exercise per Week: 1 day     Minutes of Exercise per Session: 60 min   Stress: Stress Concern Present (10/13/2020)    [employer] [place] [employer] - Occupational Stress Questionnaire     Feeling of Stress : To some extent   Social Connections: Socially Isolated (10/13/2020)    Social Connection and Isolation Panel [NHANES]     Frequency of Communication with Friends and Family: Once a week     Frequency of Social Gatherings with Friends and Family: Once a week     Attends Restoration Services: Never     Active Member of Clubs or Organizations: No     Attends Club or Organization Meetings: Never     Marital Status:    Interpersonal Safety: Low Risk  (4/30/2024)    Interpersonal Safety     Do you feel physically and emotionally safe where you currently live?: Yes     Within the past 12 months, have you been hit, slapped, kicked or otherwise physically hurt by someone?: No     Within the past 12 months, have you been humiliated or emotionally abused in other ways by your partner or ex-partner?: No   Housing Stability: Low Risk  (1/23/2024)    Housing Stability     Do you have housing? : Yes     Are you worried about losing your housing?: No     Family History   Problem Relation Age of  "Onset    Skin Cancer Mother     Cancer Mother         mastectomy    Diabetes Mother          3/2016    Cerebrovascular Disease Mother         Passed away in Feb of this year, 80 years old.    Thyroid Disease Mother     Depression Mother     Breast Cancer Mother     Obesity Mother         280 pounds  from this and complications from D    Pancreatic Cancer Father 60    Prostate Cancer Father         Currently in Remission    Macular Degeneration Father     Glaucoma Father     Skin Cancer Father     Coronary Artery Disease Father         Started in his 70's  at 88 in Octobe2023    Colon Cancer Father     Thyroid Disease Sister     Depression Sister     Asthma Sister     Sleep Apnea Brother     Colorectal Cancer Maternal Grandmother     Cancer Maternal Grandmother     Substance Abuse Maternal Grandmother         Alcohol    Prostate Cancer Maternal Grandfather     Substance Abuse Maternal Grandfather         Alcohol    Colorectal Cancer Paternal Grandmother     Asthma Sister         Had since birth    Liver Disease No family hx of     Melanoma No family hx of     Anesthesia Reaction No family hx of     Deep Vein Thrombosis (DVT) No family hx of        Lab Results   Component Value Date    A1C 5.6 2024    A1C 5.6 2024    A1C 5.7 2023    A1C 6.3 2023    A1C 6.9 2022    A1C 6.0 01/10/2022    A1C 6.0 2020    A1C 6.3 2020    A1C 6.6 2018    A1C 6.5 2017    A1C 7.8 10/25/2016         Lab Results   Component Value Date    WBC 6.2 10/23/2024    WBC 4.4 2021     Lab Results   Component Value Date    RBC 3.80 10/23/2024    RBC 2.35 2021     Lab Results   Component Value Date    HGB 11.3 10/23/2024    HGB 7.3 2021     Lab Results   Component Value Date    HCT 36.1 10/23/2024    HCT 22.6 2021     No components found for: \"MCT\"  Lab Results   Component Value Date    MCV 95 10/23/2024    MCV 96 2021     Lab Results   Component Value " Date    MCH 29.7 10/23/2024    MCH 31.1 06/28/2021     Lab Results   Component Value Date    MCHC 31.3 10/23/2024    MCHC 32.3 06/28/2021     Lab Results   Component Value Date    RDW 16.1 10/23/2024    RDW 15.1 06/28/2021     Lab Results   Component Value Date     10/23/2024     06/28/2021     Last Comprehensive Metabolic Panel:  Lab Results   Component Value Date     10/23/2024    POTASSIUM 4.5 10/23/2024    CHLORIDE 103 10/23/2024    CO2 23 10/23/2024    ANIONGAP 13 10/23/2024     (H) 10/23/2024    BUN 24.7 (H) 10/23/2024    CR 1.52 (H) 10/23/2024    GFRESTIMATED 52 (L) 10/23/2024    DEBORAH 7.5 (L) 10/23/2024           Subjective findings- 59-year-old returns clinic for diabetic foot cares.  Relates his foot calluses have been hurting, he was walking 15,000 steps a day but now is walking every other day because of his cancer and the calluses hurt, relates he is wearing his diabetic shoes but feels he may need some new ones, relates he has numbness tingling and neuropathy in his feet, relates to no ulcers, no sores, no injuries, relates to using moisturizing lotion twice a day.    Objective findings- DP and PT are 2 out of 4 bilaterally.  Has mild peripheral edema bilaterally.  Has decreased hair growth bilaterally.  Has dorsal contracted digits 2 through 5 bilaterally.  Has nucleated hyperkeratotic tissue buildup plantar first and fifth MPJ right greater than left, has nucleated hyperkeratotic tissue buildup plantar right fourth MPJ with pain on palpation bilaterally.  There is no erythema, no drainage, no odor, no calor, no tendon voids bilaterally.  Has incurvated toenails bilaterally.    Assessment and plan- Onychauxis bilaterally.  Diabetes with peripheral neuropathy.  Tylomas causing pain bilaterally.  Foot deformities bilaterally.  Diagnosis and treatment options discussed with the patient.  Continue the moisturizing lotion.  All the toenails were reduced bilaterally upon consent.   Tylomas bilaterally 5 of them were sharp debrided with a 10 blade upon consent.  Prescription for new Diabetic shoes and insoles given and use discussed with the patient and he is given the phone number address orthotics prosthetics lab for these.  Previous notes reviewed.  Return to clinic and see me in 2 months.    Moderate level of medical decision making.          Again, thank you for allowing me to participate in the care of your patient.      Sincerely,    Lamin Gonzalez DPM

## 2024-11-12 DIAGNOSIS — Z94.4 LIVER REPLACED BY TRANSPLANT (H): ICD-10-CM

## 2024-11-13 ENCOUNTER — MYC MEDICAL ADVICE (OUTPATIENT)
Dept: ORTHOPEDICS | Facility: CLINIC | Age: 59
End: 2024-11-13
Payer: MEDICARE

## 2024-11-14 ENCOUNTER — DOCUMENTATION ONLY (OUTPATIENT)
Dept: FAMILY MEDICINE | Facility: CLINIC | Age: 59
End: 2024-11-14
Payer: MEDICARE

## 2024-11-14 NOTE — PROGRESS NOTES
Type of Form Received: [facility] Diabetic Foot Exam    Form Received (Date) 11/13/24   Form Filled out Yes, faxed 11/18/24   Placed in provider folder Yes

## 2024-11-15 NOTE — PROGRESS NOTES
HPI:   Frandy Workman is a very pleasant 59 year old man here for follow up of type 2 diabetes complicated by nephropathy, retinopathy and neuropathy. 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 Unfortunately, he also has new peritoneal metastatic disease.  No nausea/vomiting.  +diarrhea, ongoing. Very tired. Nasal congestion is better. Drinking plenty of fluids.     Eating more.  Appetite is ok.  Feels like glucose is too up and down.  Walking is down from 15,000 steps now down to 6,000-9,000/day.      Dr. Rao had him stop taking blood pressure medication. Blood pressure is high today.  Having more swelling in his ankles. No cough or shortness of breath.     DM Regimen:  - Tresiba 15 units /day   - Novolog- 10-12 units each time.   - Correction Novolo unit Novolo mg /dl >180   - Empagliflozin 10 mg daily (had JERONIMO/hypovolemias on higher dose)    Previous treatments:   - Metformin- started to get diarrhea from it. 500 mg daily. NOT TAKING- this was stopped since last hospitalization.    Patient wears a camilo 2 sensor with an overall average of 142 mg/dL (CV 33%, TIR 77%, hypo 3%, severe hypo 0%) over the past 2 weeks.   Recent glucose is as follows:                   Diabetes Control:   Lab Results   Component Value Date    A1C 6.0 01/10/2022    A1C 6.8 2021    A1C 6.0 2020    A1C 6.3 2020    A1C 6.6 2018    A1C 6.5 2017    A1C 7.8 10/25/2016       Past Medical History:   Diagnosis Date    Anemia     Arthritis     BPH (benign prostatic hyperplasia)     CAD (coronary artery disease) 2019    Cholelithiasis     Conductive hearing loss 2017    Depressive disorder 1986    Suffer effects throughout life    Gastroesophageal reflux disease 2014    HCC (hepatocellular carcinoma) (H) 2019     History of blood transfusion 2019    Had blood transfussion until Dec 2022    History of diabetic retinopathy 07/2018    HTN (hypertension) 11/20/2019    Hyperlipidemia     Liver cirrhosis secondary to ESTRADA (H)     Liver transplanted (H) 11/11/2019    Portal vein thrombosis 04/11/2019    SCC (squamous cell carcinoma) 02/15/2023    02/15/23:  Left temporal scalp    Type II diabetes mellitus (H)        Past Surgical History:   Procedure Laterality Date    ABDOMEN SURGERY  11/11/2019    Had Liver Transplant    BIOPSY  12/2022    Had biopsy done will have additional procedure 2/15/2023    BYPASS GRAFT ARTERY CORONARY N/A 07/14/2021    Procedure: median sternotomy, on cardiopulmonary bypass, CORONARY ARTERY BYPASS GRAFT (CABG) x2 with left greater saphenous vein endoscopic harvest and left internal mammery artery harvest;  Surgeon: Tom Zapata MD;  Location: [facility] OR    CARDIAC SURGERY  7/2021    Heart Graph. and bypass surgery    CHOLECYSTECTOMY  11/11/2022    Removed with Liver Transplant    COLONOSCOPY      2015    COLONOSCOPY N/A 12/06/2019    Procedure: COLONOSCOPY, WITH POLYPECTOMY AND BIOPSY;  Surgeon: Adam Morton MD;  Location:  GI    CV CENTRAL VENOUS CATHETER PLACEMENT N/A 07/12/2021    Procedure: Central Venous Catheter Placement;  Surgeon: Fermin Polanco MD;  Location: [facility] CARDIAC CATH LAB    CV CORONARY ANGIOGRAM N/A 07/12/2021    Procedure: Coronary Angiogram;  Surgeon: Fermin Polanco MD;  Location: [facility] CARDIAC CATH LAB    CV HEART CATHETERIZATION WITH POSSIBLE INTERVENTION N/A 02/26/2019    Procedure: CORS;  Surgeon: Jagdish Hoyt MD;  Location: [facility] CARDIAC CATH LAB    CV INTRA AORTIC BALLOON N/A 07/12/2021    Procedure: Intra Aortic Balloon Pump Insertion;  Surgeon: Fermin Polanco MD;  Location: [facility] CARDIAC CATH LAB    ESOPHAGOSCOPY, GASTROSCOPY, DUODENOSCOPY (EGD), COMBINED N/A 11/17/2016    Procedure: COMBINED  ESOPHAGOSCOPY, GASTROSCOPY, DUODENOSCOPY (EGD);  Surgeon: Santi Rosas MD;  Location: [facility] GI    ESOPHAGOSCOPY, GASTROSCOPY, DUODENOSCOPY (EGD), COMBINED N/A 11/17/2017    Procedure: COMBINED ESOPHAGOSCOPY, GASTROSCOPY, DUODENOSCOPY (EGD);  EGD;  Surgeon: Santi Rosas MD;  Location: [facility] GI    ESOPHAGOSCOPY, GASTROSCOPY, DUODENOSCOPY (EGD), COMBINED N/A 12/28/2018    Procedure: EGD;  Surgeon: Santi Rosas MD;  [facility]    ESOPHAGOSCOPY, GASTROSCOPY, DUODENOSCOPY (EGD), COMBINED N/A 12/06/2019    Procedure: ESOPHAGOGASTRODUODENOSCOPY, WITH BIOPSY;  Surgeon: Adam Morton MD;  Location: [facility] GI    ESOPHAGOSCOPY, GASTROSCOPY, DUODENOSCOPY (EGD), COMBINED N/A 02/13/2020    Procedure: ESOPHAGOGASTRODUODENOSCOPY (EGD);  Surgeon: Santi Rosas MD;  Location:  GI    EXCISE MASS ABDOMEN N/A 07/13/2023    Procedure: Laparoscopic lysis of adhesions, laparoscopic resection of abdominal mass;  Surgeon: Ruiz Chu MD;  Location:  OR    HEAD & NECK SURGERY      12/2017 at [facility].     IMPLANT GOLD WEIGHT EYELID Right 11/16/2017    Procedure: IMPLANT WEIGHT EYELID;  Right Upper Eyelid Weight, right tarsal strip lower eyelid;  Surgeon: Milana Malave MD;  [facility]    IR CHEMO EMBOLIZATION  01/22/2019    KNEE SURGERY Left     ORTHOPEDIC SURGERY      PAROTIDECTOMY, RADICAL NECK DISSECTION Right 11/02/2017    Procedure: PAROTIDECTOMY, RADICAL NECK DISSECTION;  Right Superfacial Parotidectomy , Facial nerve repair. with [facility] facial nerve monitor.;  Surgeon: Asiya Morgan MD;  Location: [facility]    PHACOEMULSIFICATION CLEAR CORNEA WITH STANDARD INTRAOCULAR LENS IMPLANT Right 01/24/2023    Procedure: PHACOEMULSIFICATION, COMPLEX CATARACT, WITH INTRAOCULAR LENS IMPLANT WITH TRYPAN RIGHT EYE;  Surgeon: Enriqueta Martin MD;  Location: [facility] OR    PHACOEMULSIFICATION WITH STANDARD INTRAOCULAR LENS IMPLANT Left 01/03/2023    Procedure: PHACOEMULSIFICATION, COMPLEX  CATARACT, WITH STANDARD INTRAOCULAR LENS IMPLANT INSERTION LEFT EYE;  Surgeon: Enriqueta Martin MD;  Location: [facility] OR    PICC INSERTION Left 2017    4fr SL BioFlo PICC, 44cm in the L basilic vein w/ tip in the low SVC    RETURN LIVER TRANSPLANT N/A 2019    Procedure: Exploratory laparotomy, hematoma evacuation, abdominal washout;  Surgeon: Александр Ramos MD;  Location: [facility]    TRANSPLANT LIVER RECIPIENT  DONOR N/A 2019    Procedure: TRANSPLANT, LIVER, RECIPIENT,  DONOR;  Surgeon: Александр Ramos MD;  Location: [facility] OR    VASCULAR SURGERY         Family History   Problem Relation Age of Onset    Skin Cancer Mother     Cancer Mother         mastectomy    Diabetes Mother          3/2016    Cerebrovascular Disease Mother         Passed away in Feb of this year, 80 years old.    Thyroid Disease Mother     Depression Mother     Breast Cancer Mother     Obesity Mother         280 pounds  from this and complications from D    Pancreatic Cancer Father 60    Prostate Cancer Father         Currently in Remission    Macular Degeneration Father     Glaucoma Father     Skin Cancer Father     Coronary Artery Disease Father         Started in his 70's  at 88 in 2023    Colon Cancer Father     Thyroid Disease Sister     Depression Sister     Asthma Sister     Sleep Apnea Brother     Colorectal Cancer Maternal Grandmother     Cancer Maternal Grandmother     Substance Abuse Maternal Grandmother         Alcohol    Prostate Cancer Maternal Grandfather     Substance Abuse Maternal Grandfather         Alcohol    Colorectal Cancer Paternal Grandmother     Asthma Sister         Had since birth    Liver Disease No family hx of     Melanoma No family hx of     Anesthesia Reaction No family hx of     Deep Vein Thrombosis (DVT) No family hx of        Social History     Socioeconomic History    Marital status:     Highest education level: Bachelor's degree (e.g., BA,  AB, BS)   Tobacco Use    Smoking status: Former     Packs/day: 6.00     Years: 30.00     Pack years: 180.00     Types: Cigars, Cigarettes     Start date: 2016     Quit date: 10/25/2017     Years since quittin.0    Smokeless tobacco: Former     Types: Chew     Quit date: 10/31/2017    Tobacco comments:     1 tin per week   Substance and Sexual Activity    Alcohol use: No     Alcohol/week: 0.0 standard drinks     Comment: quit 1996    Drug use: No    Sexual activity: Not Currently     Partners: Female     Birth control/protection: Condom   Other Topics Concern    Parent/sibling w/ CABG, MI or angioplasty before 65F 55M? Yes   Social History Narrative    Prior , [place]     Social Determinants of Health     Intimate Partner Violence: Not At Risk    Fear of Current or Ex-Partner: No    Emotionally Abused: No    Physically Abused: No    Sexually Abused: No       Current Outpatient Medications   Medication Sig Dispense Refill    acetaminophen (TYLENOL) 325 MG tablet TAKE 1 TABLET BY MOUTH EVERY SIX HOURS AS NEEDED FOR MILD PAIN 100 tablet 3    albuterol (PROAIR HFA/PROVENTIL HFA/VENTOLIN HFA) 108 (90 Base) MCG/ACT inhaler INHALE 2 PUFFS INTO THE LUNGS EVERY 4 HOURS AS NEEDED FOR SHORTNESS OF BREATH, WHEEZING OR COUGH 18 g 0    ammonium lactate (AMLACTIN) 12 % external cream APPLY TOPICALLY DAILY AS NEEDED FOR DRY SKIN (ON FEET) 140 g 5    amoxicillin-clavulanate (AUGMENTIN) 875-125 MG tablet Take 1 tablet by mouth 2 times daily 20 tablet 0    benzoyl peroxide 5 % external liquid Use topically in showers as a body wash 226 g 9    blood glucose (NO BRAND SPECIFIED) lancets standard Use to test blood sugar 1 times daily or as directed. 100 each 11    Continuous Glucose Sensor (FREESTYLE CLAUDIA 2 SENSOR) [facility] 1 each See Admin Instructions Change every 14 days. 7 each 3    diphenoxylate-atropine (LOMOTIL) 2.5-0.025 MG tablet Take 1 tablet by mouth 4 times daily as needed for diarrhea. 60 tablet 0     diphenoxylate-atropine (LOMOTIL) 2.5-0.025 MG tablet Take 1 tablet by mouth 4 times daily as needed for diarrhea. 20 tablet 0    ELIQUIS ANTICOAGULANT 5 MG tablet TAKE 1 TABLET BY MOUTH TWICE A DAY 60 tablet 3    empagliflozin (JARDIANCE) 10 MG TABS tablet Take 1 tablet (10 mg) by mouth daily 30 tablet 11    insulin aspart (FIASP FLEXTOUCH) 100 UNIT/ML pen-injector Inject 5-10 Units Subcutaneous 4 times daily (with meals and nightly) 1unit : 8 g carb before meals. Also add 1 unit : 50 mg/dl >180 before meals and at bedtime. Approx 45 units daily. 45 mL 3    insulin aspart (NOVOLOG FLEXPEN) 100 UNIT/ML pen INJECT 5-10U SUBCUTANEOUSLY FOUR TIMES A DAY ( WITH MEALS AND EVERY NIGHT ) ONE UNIT :8CARB BEFORE MEALS . ALSO ADD ONE UNIT : 50MG/DL 15 mL 11    insulin degludec (TRESIBA FLEXTOUCH) 100 UNIT/ML pen INJECT 15 UNITS SUBCUTANEOUSLY ONCE DAILY 15 mL 1    insulin pen needle (ULTICARE MICRO) 32G X 4 MM miscellaneous USE 5 PER  each 3    ketoconazole (NIZORAL) 2 % external shampoo Apply thin layer topically to scalp in shower (leave on 5 min prior to rinse); may also use as a body wash 120 mL 11    lamiVUDine (EPIVIR) 100 MG tablet TAKE 1 TABLET BY MOUTH ONCE DAILY 90 tablet 2    lisinopril (ZESTRIL) 5 MG tablet Take 1 tablet (5 mg) by mouth daily 90 tablet 3    loperamide (IMODIUM) 2 MG capsule TAKE ONE TO TWO CAPSULES BY MOUTH FOUR TIMES A DAY AS NEEDED FOR DIARRHEA (DO NOT TAKE MORE THAN 8 CAPSULES PER DAY). 120 capsule 1    MAGNESIUM OXIDE 400 (240 Mg) MG tablet TAKE 1 TABLET BY MOUTH DAILY 30 tablet 5    metoprolol succinate ER (TOPROL XL) 25 MG 24 hr tablet TAKE 1 TABLET BY MOUTH DAILY 30 tablet 3    Multiple Vitamin (DAILY-GLADIS MULTIVITAMIN) TABS TAKE 1 TABLET BY MOUTH ONCE DAILY 30 tablet 11    mycophenolate (GENERIC EQUIVALENT) 250 MG capsule TAKE TWO CAPSULES BY MOUTH EVERY 12 HOURS 120 capsule 11    NIFEdipine ER OSMOTIC (PROCARDIA XL) 60 MG 24 hr tablet TAKE 1 TABLET(60 MG) BY MOUTH TWICE DAILY 6  tablet 0    omega 3 1000 MG CAPS Take 2,000 mg by mouth 2 times daily 120 capsule 10    ondansetron (ZOFRAN ODT) 8 MG ODT tab Take 1 tablet (8 mg) by mouth every 8 hours as needed for nausea 15 tablet 3    pantoprazole (PROTONIX) 40 MG EC tablet TAKE 1 TABLET BY MOUTH ONCE DAILY BEFORE BREAKFAST 90 tablet 3    PHOSPHORUS TABLET 250  MG per tablet TAKE 1 TABLET BY MOUTH 4 TIMES DAILY 120 tablet 1    prednisoLONE acetate (PRED FORTE) 1 % ophthalmic suspension       predniSONE (DELTASONE) 5 MG tablet TAKE ONE TABLET BY MOUTH ONCE DAILY 30 tablet 11    rosuvastatin (CRESTOR) 20 MG tablet Take 1 tablet (20 mg) by mouth daily 90 tablet 3    sodium zirconium cyclosilicate (LOKELMA) 10 g PACK packet Take 10 gram 3x/day for 2 days then 10 grams daily. (Patient taking differently: Take 10 g by mouth daily. Take 10 gram 3x/day for 2 days then 10 grams daily.) 30 packet 11    SORAfenib (NEXAVAR) 200 MG tablet Take 1 tablet (200 mg) by mouth 2 times daily. On an empty stomach 1 hour before or 2 hours after a meal. 60 tablet 0    SORAfenib (NEXAVAR) 200 MG tablet Take 1 tablet (200 mg) by mouth 2 times daily. On an empty stomach 1 hour before or 2 hours after a meal. 60 tablet 0    tamsulosin (FLOMAX) 0.4 MG capsule TAKE 1 CAPSULE BY MOUTH ONCE DAILY 30 capsule 11    UltiCare Alcohol Swabs 70 % PADS 1 each by Other route 4 times daily 400 each 1    vitamin D3 (CHOLECALCIFEROL) 50 mcg (2000 units) tablet TAKE 1 TABLET BY MOUTH ONCE DAILY 30 tablet 8     Current Facility-Administered Medications   Medication Dose Route Frequency Provider Last Rate Last Admin    aflibercept (EYLEA) injection prefilled syringe 2 mg  2 mg Intravitreal Q28 Days Enriqueta Martin MD   2 mg at 05/31/23 1144    dexamethasone (OZURDEX) intravitreal implant 0.7 mg  0.7 mg Intravitreal Q2 Months Enriqueta Martin MD        dexamethasone (OZURDEX) intravitreal implant 0.7 mg  0.7 mg Intravitreal Q2 Months Enriqueta Martin MD         faricimab-svoa ([facility]) intravitreal injection 6 mg  6 mg Intravitreal q28 days Enriqueta Martin MD faricimab-svoa ([facility]) intravitreal injection 6 mg  6 mg Intravitreal q28 days Erniqueta Martin MD faricimab-svoa ([facility]) intravitreal injection 6 mg  6 mg Intravitreal q28 days Enriqueta Martin MD   6 mg at 03/06/24 1001    lidocaine (PF) (XYLOCAINE) injection 1 mL  1 mL Ophthalmic Q2 Months Enriqueta Martin MD              Allergies   Allergen Reactions    Codeine Other (See Comments)     Cannot take due to liver  Cannot tolerate oral narcotics    Seasonal Allergies      Sneezing, coughing, runny and itchy eyes       Physical Exam  BP (!) 157/78   Pulse 84   Wt 79.4 kg (175 lb)   SpO2 100%   BMI 25.83 kg/m    GENERAL:  Alert and oriented X3, NAD, well dressed, answering questions appropriately, appears thin.  CV: RRR, no murmurs  LUNGS: CTAB, no wheezes, rales, or ronchi  EXTREMITIES: 1+ edema, +calluses.   NEUROLOGY: +monofilament sensation.     RESULTS  Lab Results   Component Value Date    A1C 5.6 09/05/2024    A1C 5.6 05/13/2024    A1C 5.7 (H) 12/18/2023    A1C 6.3 (H) 06/14/2023    A1C 6.9 (H) 12/14/2022    A1C 6.0 (H) 01/10/2022    A1C 6.0 (H) 12/30/2020    A1C 6.3 (H) 06/13/2020    A1C 6.6 (H) 08/08/2018    A1C 6.5 (H) 06/09/2017    A1C 7.8 (H) 10/25/2016    HEMOGLOBINA1 4.8 03/04/2020    HEMOGLOBINA1 5.1 12/02/2019    HEMOGLOBINA1 5.4 08/14/2019    HEMOGLOBINA1 6.1 (A) 02/11/2019    HEMOGLOBINA1 8.1 (A) 04/11/2018       TSH   Date Value Ref Range Status   09/05/2024 1.32 0.30 - 4.20 uIU/mL Final   04/25/2022 1.24 0.40 - 4.00 mU/L Final   10/04/2021 1.90 0.40 - 4.00 mU/L Final   01/28/2021 0.91 0.40 - 4.00 mU/L Final   01/24/2020 1.91 0.40 - 4.00 mU/L Final   08/08/2018 0.49 0.40 - 4.00 mU/L Final   02/08/2018 0.71 0.40 - 4.00 mU/L Final   06/09/2017 0.42 0.40 - 4.00 mU/L Final     T4 Free   Date Value Ref Range Status   08/08/2018 1.21 0.76 - 1.46 ng/dL  Final       ALT   Date Value Ref Range Status   10/23/2024 24 0 - 70 U/L Final   09/05/2024 25 0 - 70 U/L Final   06/28/2021 20 0 - 70 U/L Final   06/14/2021 21 0 - 70 U/L Final   ]    Recent Labs   Lab Test 08/02/24  0843 06/04/24  1040   CHOL 139 180   HDL 37* 43   LDL 53 94   TRIG 245* 217*       Lab Results   Component Value Date     10/23/2024     06/28/2021      Lab Results   Component Value Date    POTASSIUM 4.5 10/23/2024    POTASSIUM 7.7 08/10/2023    POTASSIUM 4.7 07/20/2022    POTASSIUM 4.6 06/28/2021     Lab Results   Component Value Date    CHLORIDE 103 10/23/2024    CHLORIDE 105 07/20/2022    CHLORIDE 107 06/28/2021     Lab Results   Component Value Date    DEBORAH 7.5 10/23/2024    DEBORAH 9.1 06/28/2021     Lab Results   Component Value Date    CO2 23 10/23/2024    CO2 26 07/20/2022    CO2 25 06/28/2021     Lab Results   Component Value Date    BUN 24.7 10/23/2024    BUN 32 07/20/2022    BUN 33 06/28/2021     Lab Results   Component Value Date    CR 1.52 10/23/2024    CR 1.62 06/28/2021       GFR Estimate   Date Value Ref Range Status   10/23/2024 52 (L) >60 mL/min/1.73m2 Final     Comment:     eGFR calculated using 2021 CKD-EPI equation.   09/05/2024 65 >60 mL/min/1.73m2 Final     Comment:     eGFR calculated using 2021 CKD-EPI equation.   08/11/2024 61 >60 mL/min/1.73m2 Final     Comment:     eGFR calculated using 2021 CKD-EPI equation.   06/28/2021 46 (L) >60 mL/min/[1.73_m2] Final     Comment:     Non  GFR Calc  Starting 12/18/2018, serum creatinine based estimated GFR (eGFR) will be   calculated using the Chronic Kidney Disease Epidemiology Collaboration   (CKD-EPI) equation.     06/14/2021 49 (L) >60 mL/min/[1.73_m2] Final     Comment:     Non  GFR Calc  Starting 12/18/2018, serum creatinine based estimated GFR (eGFR) will be   calculated using the Chronic Kidney Disease Epidemiology Collaboration   (CKD-EPI) equation.     06/04/2021 46 (L) >60  "mL/min/[1.73_m2] Final     Comment:     Non  GFR Calc  Starting 12/18/2018, serum creatinine based estimated GFR (eGFR) will be   calculated using the Chronic Kidney Disease Epidemiology Collaboration   (CKD-EPI) equation.       GFR, ESTIMATED POCT   Date Value Ref Range Status   09/05/2024 58 (L) >60 mL/min/1.73m2 Final   01/30/2024 46 (L) >60 mL/min/1.73m2 Final   07/28/2023 28 (L) >60 mL/min/1.73m2 Final     GFR Estimate If Black   Date Value Ref Range Status   06/28/2021 54 (L) >60 mL/min/[1.73_m2] Final     Comment:      GFR Calc  Starting 12/18/2018, serum creatinine based estimated GFR (eGFR) will be   calculated using the Chronic Kidney Disease Epidemiology Collaboration   (CKD-EPI) equation.     06/14/2021 57 (L) >60 mL/min/[1.73_m2] Final     Comment:      GFR Calc  Starting 12/18/2018, serum creatinine based estimated GFR (eGFR) will be   calculated using the Chronic Kidney Disease Epidemiology Collaboration   (CKD-EPI) equation.     06/04/2021 53 (L) >60 mL/min/[1.73_m2] Final     Comment:      GFR Calc  Starting 12/18/2018, serum creatinine based estimated GFR (eGFR) will be   calculated using the Chronic Kidney Disease Epidemiology Collaboration   (CKD-EPI) equation.         Lab Results   Component Value Date    MICROL 1,144.0 09/05/2024    MICROL 47 04/20/2022    MICROL 563 02/26/2021     No results found for: \"MICROALBUMIN\"  No results found for: \"CPEPT\", \"GADAB\", \"ISCAB\"    Vitamin B12   Date Value Ref Range Status   08/15/2023 491 232 - 1,245 pg/mL Final   01/11/2022 2,179 (H) 193 - 986 pg/mL Final   06/13/2020 682 193 - 986 pg/mL Final   02/04/2019 3,006 (H) 193 - 986 pg/mL Final   ]    Most recent eye exam date: : Not Found     FIB-4 Calculation: 2.18 at 10/23/2024 10:44 AM  Calculated from:  SGOT/AST: 27 U/L at 10/23/2024 10:44 AM  SGPT/ALT: 24 U/L at 10/23/2024 10:44 AM  Platelets: 152 10e3/uL at 10/23/2024 10:44 AM  Age: 59 " years  A value of FIB-4 below 1.30 is considered as low risk for advanced fibrosis; a value of FIB-4 over 2.67 is considered as high risk for advanced fibrosis; and FIB-4 values between 1.30 and 2.67 are considered as intermediate risk of advanced fibrosis.    Assessment/Plan:     1.  Type 2 diabetes- Doing well with glucose control (A1c 5.6%), but he has been having low blood sugars.  Does not like switch from novolog to fiasp, so will switch back.  Explained that hypoglycemia likely is due to him pre-bolusing with fiasp, but fiasp is designed to be taken at the START of the meal.  He will do this when he uses fiasp.  Reassured him that his glucose is under good control.  We made the following plan today (instructions given to patient):      Lower tresiba to 14 units    Target glucose is .     Take Novolog before meals and bedtime.     Please let me know if you are having low blood sugars less than 70 or over 250 mg/dL.      If you have concerns, please send me a [employer] message M-Th, call the clinic at [phone], or call [phone] after hours/weekends and ask to speak with the endocrinologist on call.      2.  Risk factors-     Retinopathy:  Yes.  Had eye exam within last year.   Nephropathy:  BP historically well controlled.+ albuminuria.  Creatinine stable.   Neuropathy: Yes.   Feet: OK, no ulcers. +neuropathy Sees podiatry today. Wears diabetic shoe.   Lipids:  TG were high at last check. Goal LDL <70. Patient taking rosuvastatin     3.  F/U in 6 months, sooner with concerns.    22 minutes spent on the date of the encounter doing chart review, review of test results, review and interpretation of glucose data, patient visit and documentation, counseling/coordination of care, and discussion of follow up plan for worsening hyper and hypoglycemia.  The patient understood and is satisfied with today's visit.          Frannie Vasquez PA-C, MPAS   [facility]  Department of Medicine  Division of  Endocrinology and Diabetes

## 2024-11-17 ENCOUNTER — MYC MEDICAL ADVICE (OUTPATIENT)
Dept: NEPHROLOGY | Facility: CLINIC | Age: 59
End: 2024-11-17
Payer: MEDICARE

## 2024-11-17 DIAGNOSIS — N18.32 STAGE 3B CHRONIC KIDNEY DISEASE (CKD) (H): Primary | ICD-10-CM

## 2024-11-18 ENCOUNTER — OFFICE VISIT (OUTPATIENT)
Dept: ENDOCRINOLOGY | Facility: CLINIC | Age: 59
End: 2024-11-18
Payer: MEDICARE

## 2024-11-18 VITALS
OXYGEN SATURATION: 100 % | HEART RATE: 84 BPM | DIASTOLIC BLOOD PRESSURE: 78 MMHG | BODY MASS INDEX: 25.83 KG/M2 | WEIGHT: 175 LBS | SYSTOLIC BLOOD PRESSURE: 157 MMHG

## 2024-11-18 DIAGNOSIS — E11.3293 TYPE 2 DIABETES MELLITUS WITH MILD NONPROLIFERATIVE RETINOPATHY OF BOTH EYES WITHOUT MACULAR EDEMA, UNSPECIFIED WHETHER LONG TERM INSULIN USE (H): Primary | ICD-10-CM

## 2024-11-18 RX ORDER — INSULIN ASPART 100 [IU]/ML
INJECTION, SOLUTION INTRAVENOUS; SUBCUTANEOUS
Qty: 45 ML | Refills: 3 | Status: SHIPPED | OUTPATIENT
Start: 2024-11-18 | End: 2024-11-21

## 2024-11-18 RX ORDER — HYDROCHLOROTHIAZIDE 12.5 MG/1
CAPSULE ORAL
Qty: 6 EACH | Refills: 1 | Status: SHIPPED | OUTPATIENT
Start: 2024-11-18

## 2024-11-18 RX ORDER — INSULIN DEGLUDEC 100 U/ML
INJECTION, SOLUTION SUBCUTANEOUS
Qty: 15 ML | Refills: 3 | Status: SHIPPED | OUTPATIENT
Start: 2024-11-18 | End: 2024-11-21

## 2024-11-18 ASSESSMENT — PAIN SCALES - GENERAL: PAINLEVEL_OUTOF10: NO PAIN (0)

## 2024-11-18 NOTE — LETTER
2024       RE: Frandy Workman  [address] [facility] [address]     Dear Colleague,    Thank you for referring your patient, Frandy Workman, to the [facility] ENDOCRINOLOG[facility]. Please see a copy of my visit note below.    HPI:   Frandy Workman is a very pleasant 59 year old man here for follow up of type 2 diabetes complicated by nephropathy, retinopathy and neuropathy. 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 Unfortunately, he also has new peritoneal metastatic disease.  No nausea/vomiting.  +diarrhea, ongoing. Very tired. Nasal congestion is better. Drinking plenty of fluids.     Eating more.  Appetite is ok.  Feels like glucose is too up and down.  Walking is down from 15,000 steps now down to 6,000-9,000/day.      Dr. Rao had him stop taking blood pressure medication. Blood pressure is high today.  Having more swelling in his ankles. No cough or shortness of breath.     DM Regimen:  - Tresiba 15 units /day   - Novolog- 10-12 units each time.   - Correction Novolo unit Novolo mg /dl >180   - Empagliflozin 10 mg daily (had JERONIMO/hypovolemias on higher dose)    Previous treatments:   - Metformin- started to get diarrhea from it. 500 mg daily. NOT TAKING- this was stopped since last hospitalization.    Patient wears a camilo 2 sensor with an overall average of 142 mg/dL (CV 33%, TIR 77%, hypo 3%, severe hypo 0%) over the past 2 weeks.   Recent glucose is as follows:                   Diabetes Control:   Lab Results   Component Value Date    A1C 6.0 01/10/2022    A1C 6.8 2021    A1C 6.0 2020    A1C 6.3 2020    A1C 6.6 2018    A1C 6.5 2017    A1C 7.8 10/25/2016       Past Medical History:   Diagnosis Date      Anemia 2013     Arthritis      BPH (benign prostatic hyperplasia)      CAD (coronary artery disease) 04/01/2019     Cholelithiasis      Conductive hearing loss 08/16/2017     Depressive disorder 1986    Suffer effects throughout life     Gastroesophageal reflux disease 12/01/2014     HCC (hepatocellular carcinoma) (H) 01/22/2019     History of blood transfusion 2019    Had blood transfussion until Dec 2022     History of diabetic retinopathy 07/2018     HTN (hypertension) 11/20/2019     Hyperlipidemia      Liver cirrhosis secondary to ESTRADA (H)      Liver transplanted (H) 11/11/2019     Portal vein thrombosis 04/11/2019     SCC (squamous cell carcinoma) 02/15/2023    02/15/23:  Left temporal scalp     Type II diabetes mellitus (H)        Past Surgical History:   Procedure Laterality Date     ABDOMEN SURGERY  11/11/2019    Had Liver Transplant     BIOPSY  12/2022    Had biopsy done will have additional procedure 2/15/2023     BYPASS GRAFT ARTERY CORONARY N/A 07/14/2021    Procedure: median sternotomy, on cardiopulmonary bypass, CORONARY ARTERY BYPASS GRAFT (CABG) x2 with left greater saphenous vein endoscopic harvest and left internal mammery artery harvest;  Surgeon: Tom Zapata MD;  Location: [facility] OR     CARDIAC SURGERY  7/2021    Heart Graph. and bypass surgery     CHOLECYSTECTOMY  11/11/2022    Removed with Liver Transplant     COLONOSCOPY      2015     COLONOSCOPY N/A 12/06/2019    Procedure: COLONOSCOPY, WITH POLYPECTOMY AND BIOPSY;  Surgeon: Adam Morton MD;  Location:  GI     CV CENTRAL VENOUS CATHETER PLACEMENT N/A 07/12/2021    Procedure: Central Venous Catheter Placement;  Surgeon: Fermin Polanco MD;  Location: [facility] CARDIAC CATH LAB     CV CORONARY ANGIOGRAM N/A 07/12/2021    Procedure: Coronary Angiogram;  Surgeon: Fermin Polanco MD;  Location:  HEART CARDIAC CATH LAB     CV HEART CATHETERIZATION WITH POSSIBLE INTERVENTION N/A 02/26/2019    Procedure:  CORS;  Surgeon: Jagdish Hoyt MD;  Location:  HEART CARDIAC CATH LAB     CV INTRA AORTIC BALLOON N/A 07/12/2021    Procedure: Intra Aortic Balloon Pump Insertion;  Surgeon: Fermin Polanco MD;  Location:  HEART CARDIAC CATH LAB     ESOPHAGOSCOPY, GASTROSCOPY, DUODENOSCOPY (EGD), COMBINED N/A 11/17/2016    Procedure: COMBINED ESOPHAGOSCOPY, GASTROSCOPY, DUODENOSCOPY (EGD);  Surgeon: Santi Rosas MD;  Location:  GI     ESOPHAGOSCOPY, GASTROSCOPY, DUODENOSCOPY (EGD), COMBINED N/A 11/17/2017    Procedure: COMBINED ESOPHAGOSCOPY, GASTROSCOPY, DUODENOSCOPY (EGD);  EGD;  Surgeon: Santi Rosas MD;  Location:  GI     ESOPHAGOSCOPY, GASTROSCOPY, DUODENOSCOPY (EGD), COMBINED N/A 12/28/2018    Procedure: EGD;  Surgeon: Santi Rosas MD;  Location:  OR     ESOPHAGOSCOPY, GASTROSCOPY, DUODENOSCOPY (EGD), COMBINED N/A 12/06/2019    Procedure: ESOPHAGOGASTRODUODENOSCOPY, WITH BIOPSY;  Surgeon: Adam Morton MD;  Location:  GI     ESOPHAGOSCOPY, GASTROSCOPY, DUODENOSCOPY (EGD), COMBINED N/A 02/13/2020    Procedure: ESOPHAGOGASTRODUODENOSCOPY (EGD);  Surgeon: Santi Rosas MD;  Location:  GI     EXCISE MASS ABDOMEN N/A 07/13/2023    Procedure: Laparoscopic lysis of adhesions, laparoscopic resection of abdominal mass;  Surgeon: Ruiz Chu MD;  Location:  OR     HEAD & NECK SURGERY      12/2017 at [facility].      IMPLANT GOLD WEIGHT EYELID Right 11/16/2017    Procedure: IMPLANT WEIGHT EYELID;  Right Upper Eyelid Weight, right tarsal strip lower eyelid;  Surgeon: Milana Malave MD;  Location:  OR     IR CHEMO EMBOLIZATION  01/22/2019     KNEE SURGERY Left      ORTHOPEDIC SURGERY       PAROTIDECTOMY, RADICAL NECK DISSECTION Right 11/02/2017    Procedure: PAROTIDECTOMY, RADICAL NECK DISSECTION;  Right Superfacial Parotidectomy , Facial nerve repair. with [facility] facial nerve monitor.;  Surgeon: Asiya Morgan MD;  Location:  OR      PHACOEMULSIFICATION CLEAR CORNEA WITH STANDARD INTRAOCULAR LENS IMPLANT Right 2023    Procedure: PHACOEMULSIFICATION, COMPLEX CATARACT, WITH INTRAOCULAR LENS IMPLANT WITH TRYPAN RIGHT EYE;  Surgeon: Enriqueta Martin MD;  Location: [facility] OR     PHACOEMULSIFICATION WITH STANDARD INTRAOCULAR LENS IMPLANT Left 2023    Procedure: PHACOEMULSIFICATION, COMPLEX CATARACT, WITH STANDARD INTRAOCULAR LENS IMPLANT INSERTION LEFT EYE;  Surgeon: Enriqueta Martin MD;  Location: [facility] OR     PICC INSERTION Left 2017    4fr SL BioFlo PICC, 44cm in the L basilic vein w/ tip in the low SVC     RETURN LIVER TRANSPLANT N/A 2019    Procedure: Exploratory laparotomy, hematoma evacuation, abdominal washout;  Surgeon: Александр Ramos MD;  Location: [facility] OR     TRANSPLANT LIVER RECIPIENT  DONOR N/A 2019    Procedure: TRANSPLANT, LIVER, RECIPIENT,  DONOR;  Surgeon: Александр Ramos MD;  Location: [facility] OR     VASCULAR SURGERY         Family History   Problem Relation Age of Onset     Skin Cancer Mother      Cancer Mother         mastectomy     Diabetes Mother          3/2016     Cerebrovascular Disease Mother         Passed away in Feb of this year, 80 years old.     Thyroid Disease Mother      Depression Mother      Breast Cancer Mother      Obesity Mother         280 pounds  from this and complications from D     Pancreatic Cancer Father 60     Prostate Cancer Father         Currently in Remission     Macular Degeneration Father      Glaucoma Father      Skin Cancer Father      Coronary Artery Disease Father         Started in his 70's  at 88 in 2023     Colon Cancer Father      Thyroid Disease Sister      Depression Sister      Asthma Sister      Sleep Apnea Brother      Colorectal Cancer Maternal Grandmother      Cancer Maternal Grandmother      Substance Abuse Maternal Grandmother         Alcohol     Prostate Cancer Maternal Grandfather      Substance Abuse  Maternal Grandfather         Alcohol     Colorectal Cancer Paternal Grandmother      Asthma Sister         Had since birth     Liver Disease No family hx of      Melanoma No family hx of      Anesthesia Reaction No family hx of      Deep Vein Thrombosis (DVT) No family hx of        Social History     Socioeconomic History     Marital status:      Highest education level: Bachelor's degree (e.g., BA, AB, BS)   Tobacco Use     Smoking status: Former     Packs/day: 6.00     Years: 30.00     Pack years: 180.00     Types: Cigars, Cigarettes     Start date: 2016     Quit date: 10/25/2017     Years since quittin.0     Smokeless tobacco: Former     Types: Chew     Quit date: 10/31/2017     Tobacco comments:     1 tin per week   Substance and Sexual Activity     Alcohol use: No     Alcohol/week: 0.0 standard drinks     Comment: quit 1996     Drug use: No     Sexual activity: Not Currently     Partners: Female     Birth control/protection: Condom   Other Topics Concern     Parent/sibling w/ CABG, MI or angioplasty before 65F 55M? Yes   Social History Narrative    Prior , [facility]     Social Determinants of Health     Intimate Partner Violence: Not At Risk     Fear of Current or Ex-Partner: No     Emotionally Abused: No     Physically Abused: No     Sexually Abused: No       Current Outpatient Medications   Medication Sig Dispense Refill     acetaminophen (TYLENOL) 325 MG tablet TAKE 1 TABLET BY MOUTH EVERY SIX HOURS AS NEEDED FOR MILD PAIN 100 tablet 3     albuterol (PROAIR HFA/PROVENTIL HFA/VENTOLIN HFA) 108 (90 Base) MCG/ACT inhaler INHALE 2 PUFFS INTO THE LUNGS EVERY 4 HOURS AS NEEDED FOR SHORTNESS OF BREATH, WHEEZING OR COUGH 18 g 0     ammonium lactate (AMLACTIN) 12 % external cream APPLY TOPICALLY DAILY AS NEEDED FOR DRY SKIN (ON FEET) 140 g 5     amoxicillin-clavulanate (AUGMENTIN) 875-125 MG tablet Take 1 tablet by mouth 2 times daily 20 tablet 0     benzoyl peroxide 5 % external liquid  Use topically in showers as a body wash 226 g 9     blood glucose (NO BRAND SPECIFIED) lancets standard Use to test blood sugar 1 times daily or as directed. 100 each 11     Continuous Glucose Sensor (FREESTYLE CLAUDIA 2 SENSOR) [facility] 1 each See Admin Instructions Change every 14 days. 7 each 3     diphenoxylate-atropine (LOMOTIL) 2.5-0.025 MG tablet Take 1 tablet by mouth 4 times daily as needed for diarrhea. 60 tablet 0     diphenoxylate-atropine (LOMOTIL) 2.5-0.025 MG tablet Take 1 tablet by mouth 4 times daily as needed for diarrhea. 20 tablet 0     ELIQUIS ANTICOAGULANT 5 MG tablet TAKE 1 TABLET BY MOUTH TWICE A DAY 60 tablet 3     empagliflozin (JARDIANCE) 10 MG TABS tablet Take 1 tablet (10 mg) by mouth daily 30 tablet 11     insulin aspart (FIASP FLEXTOUCH) 100 UNIT/ML pen-injector Inject 5-10 Units Subcutaneous 4 times daily (with meals and nightly) 1unit : 8 g carb before meals. Also add 1 unit : 50 mg/dl >180 before meals and at bedtime. Approx 45 units daily. 45 mL 3     insulin aspart (NOVOLOG FLEXPEN) 100 UNIT/ML pen INJECT 5-10U SUBCUTANEOUSLY FOUR TIMES A DAY ( WITH MEALS AND EVERY NIGHT ) ONE UNIT :8CARB BEFORE MEALS . ALSO ADD ONE UNIT : 50MG/DL 15 mL 11     insulin degludec (TRESIBA FLEXTOUCH) 100 UNIT/ML pen INJECT 15 UNITS SUBCUTANEOUSLY ONCE DAILY 15 mL 1     insulin pen needle (ULTICARE MICRO) 32G X 4 MM miscellaneous USE 5 PER  each 3     ketoconazole (NIZORAL) 2 % external shampoo Apply thin layer topically to scalp in shower (leave on 5 min prior to rinse); may also use as a body wash 120 mL 11     lamiVUDine (EPIVIR) 100 MG tablet TAKE 1 TABLET BY MOUTH ONCE DAILY 90 tablet 2     lisinopril (ZESTRIL) 5 MG tablet Take 1 tablet (5 mg) by mouth daily 90 tablet 3     loperamide (IMODIUM) 2 MG capsule TAKE ONE TO TWO CAPSULES BY MOUTH FOUR TIMES A DAY AS NEEDED FOR DIARRHEA (DO NOT TAKE MORE THAN 8 CAPSULES PER DAY). 120 capsule 1     MAGNESIUM OXIDE 400 (240 Mg) MG tablet TAKE 1 TABLET BY  MOUTH DAILY 30 tablet 5     metoprolol succinate ER (TOPROL XL) 25 MG 24 hr tablet TAKE 1 TABLET BY MOUTH DAILY 30 tablet 3     Multiple Vitamin (DAILY-GLADIS MULTIVITAMIN) TABS TAKE 1 TABLET BY MOUTH ONCE DAILY 30 tablet 11     mycophenolate (GENERIC EQUIVALENT) 250 MG capsule TAKE TWO CAPSULES BY MOUTH EVERY 12 HOURS 120 capsule 11     NIFEdipine ER OSMOTIC (PROCARDIA XL) 60 MG 24 hr tablet TAKE 1 TABLET(60 MG) BY MOUTH TWICE DAILY 6 tablet 0     omega 3 1000 MG CAPS Take 2,000 mg by mouth 2 times daily 120 capsule 10     ondansetron (ZOFRAN ODT) 8 MG ODT tab Take 1 tablet (8 mg) by mouth every 8 hours as needed for nausea 15 tablet 3     pantoprazole (PROTONIX) 40 MG EC tablet TAKE 1 TABLET BY MOUTH ONCE DAILY BEFORE BREAKFAST 90 tablet 3     PHOSPHORUS TABLET 250  MG per tablet TAKE 1 TABLET BY MOUTH 4 TIMES DAILY 120 tablet 1     prednisoLONE acetate (PRED FORTE) 1 % ophthalmic suspension        predniSONE (DELTASONE) 5 MG tablet TAKE ONE TABLET BY MOUTH ONCE DAILY 30 tablet 11     rosuvastatin (CRESTOR) 20 MG tablet Take 1 tablet (20 mg) by mouth daily 90 tablet 3     sodium zirconium cyclosilicate (LOKELMA) 10 g PACK packet Take 10 gram 3x/day for 2 days then 10 grams daily. (Patient taking differently: Take 10 g by mouth daily. Take 10 gram 3x/day for 2 days then 10 grams daily.) 30 packet 11     SORAfenib (NEXAVAR) 200 MG tablet Take 1 tablet (200 mg) by mouth 2 times daily. On an empty stomach 1 hour before or 2 hours after a meal. 60 tablet 0     SORAfenib (NEXAVAR) 200 MG tablet Take 1 tablet (200 mg) by mouth 2 times daily. On an empty stomach 1 hour before or 2 hours after a meal. 60 tablet 0     tamsulosin (FLOMAX) 0.4 MG capsule TAKE 1 CAPSULE BY MOUTH ONCE DAILY 30 capsule 11     UltiCare Alcohol Swabs 70 % PADS 1 each by Other route 4 times daily 400 each 1     vitamin D3 (CHOLECALCIFEROL) 50 mcg (2000 units) tablet TAKE 1 TABLET BY MOUTH ONCE DAILY 30 tablet 8     Current Facility-Administered  Medications   Medication Dose Route Frequency Provider Last Rate Last Admin     aflibercept (EYLEA) injection prefilled syringe 2 mg  2 mg Intravitreal Q28 Days Enriqueta Martin MD   2 mg at 05/31/23 1144     dexamethasone (OZURDEX) intravitreal implant 0.7 mg  0.7 mg Intravitreal Q2 Months Enriqueta Martin MD         dexamethasone (OZURDEX) intravitreal implant 0.7 mg  0.7 mg Intravitreal Q2 Months Enriqueta Martin MD faricimab-svoa (VABYO) intravitreal injection 6 mg  6 mg Intravitreal q28 days Enriqueta Martin MD faricimab-svoa (VABYO) intravitreal injection 6 mg  6 mg Intravitreal q28 days Enriqueta Martin MD faricimab-svoa (VABYO) intravitreal injection 6 mg  6 mg Intravitreal q28 days Enriqueta Martin MD   6 mg at 03/06/24 1001     lidocaine (PF) (XYLOCAINE) injection 1 mL  1 mL Ophthalmic Q2 Months Enriqueta Martin MD              Allergies   Allergen Reactions     Codeine Other (See Comments)     Cannot take due to liver  Cannot tolerate oral narcotics     Seasonal Allergies      Sneezing, coughing, runny and itchy eyes       Physical Exam  BP (!) 157/78   Pulse 84   Wt 79.4 kg (175 lb)   SpO2 100%   BMI 25.83 kg/m    GENERAL:  Alert and oriented X3, NAD, well dressed, answering questions appropriately, appears thin.  CV: RRR, no murmurs  LUNGS: CTAB, no wheezes, rales, or ronchi  EXTREMITIES: 1+ edema, +calluses.   NEUROLOGY: +monofilament sensation.     RESULTS  Lab Results   Component Value Date    A1C 5.6 09/05/2024    A1C 5.6 05/13/2024    A1C 5.7 (H) 12/18/2023    A1C 6.3 (H) 06/14/2023    A1C 6.9 (H) 12/14/2022    A1C 6.0 (H) 01/10/2022    A1C 6.0 (H) 12/30/2020    A1C 6.3 (H) 06/13/2020    A1C 6.6 (H) 08/08/2018    A1C 6.5 (H) 06/09/2017    A1C 7.8 (H) 10/25/2016    HEMOGLOBINA1 4.8 03/04/2020    HEMOGLOBINA1 5.1 12/02/2019    HEMOGLOBINA1 5.4 08/14/2019    HEMOGLOBINA1 6.1 (A) 02/11/2019    HEMOGLOBINA1 8.1 (A)  04/11/2018       TSH   Date Value Ref Range Status   09/05/2024 1.32 0.30 - 4.20 uIU/mL Final   04/25/2022 1.24 0.40 - 4.00 mU/L Final   10/04/2021 1.90 0.40 - 4.00 mU/L Final   01/28/2021 0.91 0.40 - 4.00 mU/L Final   01/24/2020 1.91 0.40 - 4.00 mU/L Final   08/08/2018 0.49 0.40 - 4.00 mU/L Final   02/08/2018 0.71 0.40 - 4.00 mU/L Final   06/09/2017 0.42 0.40 - 4.00 mU/L Final     T4 Free   Date Value Ref Range Status   08/08/2018 1.21 0.76 - 1.46 ng/dL Final       ALT   Date Value Ref Range Status   10/23/2024 24 0 - 70 U/L Final   09/05/2024 25 0 - 70 U/L Final   06/28/2021 20 0 - 70 U/L Final   06/14/2021 21 0 - 70 U/L Final   ]    Recent Labs   Lab Test 08/02/24  0843 06/04/24  1040   CHOL 139 180   HDL 37* 43   LDL 53 94   TRIG 245* 217*       Lab Results   Component Value Date     10/23/2024     06/28/2021      Lab Results   Component Value Date    POTASSIUM 4.5 10/23/2024    POTASSIUM 7.7 08/10/2023    POTASSIUM 4.7 07/20/2022    POTASSIUM 4.6 06/28/2021     Lab Results   Component Value Date    CHLORIDE 103 10/23/2024    CHLORIDE 105 07/20/2022    CHLORIDE 107 06/28/2021     Lab Results   Component Value Date    DEBORAH 7.5 10/23/2024    DEBORAH 9.1 06/28/2021     Lab Results   Component Value Date    CO2 23 10/23/2024    CO2 26 07/20/2022    CO2 25 06/28/2021     Lab Results   Component Value Date    BUN 24.7 10/23/2024    BUN 32 07/20/2022    BUN 33 06/28/2021     Lab Results   Component Value Date    CR 1.52 10/23/2024    CR 1.62 06/28/2021       GFR Estimate   Date Value Ref Range Status   10/23/2024 52 (L) >60 mL/min/1.73m2 Final     Comment:     eGFR calculated using 2021 CKD-EPI equation.   09/05/2024 65 >60 mL/min/1.73m2 Final     Comment:     eGFR calculated using 2021 CKD-EPI equation.   08/11/2024 61 >60 mL/min/1.73m2 Final     Comment:     eGFR calculated using 2021 CKD-EPI equation.   06/28/2021 46 (L) >60 mL/min/[1.73_m2] Final     Comment:     Non  GFR Calc  Starting  "12/18/2018, serum creatinine based estimated GFR (eGFR) will be   calculated using the Chronic Kidney Disease Epidemiology Collaboration   (CKD-EPI) equation.     06/14/2021 49 (L) >60 mL/min/[1.73_m2] Final     Comment:     Non  GFR Calc  Starting 12/18/2018, serum creatinine based estimated GFR (eGFR) will be   calculated using the Chronic Kidney Disease Epidemiology Collaboration   (CKD-EPI) equation.     06/04/2021 46 (L) >60 mL/min/[1.73_m2] Final     Comment:     Non  GFR Calc  Starting 12/18/2018, serum creatinine based estimated GFR (eGFR) will be   calculated using the Chronic Kidney Disease Epidemiology Collaboration   (CKD-EPI) equation.       GFR, ESTIMATED POCT   Date Value Ref Range Status   09/05/2024 58 (L) >60 mL/min/1.73m2 Final   01/30/2024 46 (L) >60 mL/min/1.73m2 Final   07/28/2023 28 (L) >60 mL/min/1.73m2 Final     GFR Estimate If Black   Date Value Ref Range Status   06/28/2021 54 (L) >60 mL/min/[1.73_m2] Final     Comment:      GFR Calc  Starting 12/18/2018, serum creatinine based estimated GFR (eGFR) will be   calculated using the Chronic Kidney Disease Epidemiology Collaboration   (CKD-EPI) equation.     06/14/2021 57 (L) >60 mL/min/[1.73_m2] Final     Comment:      GFR Calc  Starting 12/18/2018, serum creatinine based estimated GFR (eGFR) will be   calculated using the Chronic Kidney Disease Epidemiology Collaboration   (CKD-EPI) equation.     06/04/2021 53 (L) >60 mL/min/[1.73_m2] Final     Comment:      GFR Calc  Starting 12/18/2018, serum creatinine based estimated GFR (eGFR) will be   calculated using the Chronic Kidney Disease Epidemiology Collaboration   (CKD-EPI) equation.         Lab Results   Component Value Date    MICROL 1,144.0 09/05/2024    MICROL 47 04/20/2022    MICROL 563 02/26/2021     No results found for: \"MICROALBUMIN\"  No results found for: \"CPEPT\", \"GADAB\", \"ISCAB\"    Vitamin B12   Date " Value Ref Range Status   08/15/2023 491 232 - 1,245 pg/mL Final   01/11/2022 2,179 (H) 193 - 986 pg/mL Final   06/13/2020 682 193 - 986 pg/mL Final   02/04/2019 3,006 (H) 193 - 986 pg/mL Final   ]    Most recent eye exam date: : Not Found     FIB-4 Calculation: 2.18 at 10/23/2024 10:44 AM  Calculated from:  SGOT/AST: 27 U/L at 10/23/2024 10:44 AM  SGPT/ALT: 24 U/L at 10/23/2024 10:44 AM  Platelets: 152 10e3/uL at 10/23/2024 10:44 AM  Age: 59 years  A value of FIB-4 below 1.30 is considered as low risk for advanced fibrosis; a value of FIB-4 over 2.67 is considered as high risk for advanced fibrosis; and FIB-4 values between 1.30 and 2.67 are considered as intermediate risk of advanced fibrosis.    Assessment/Plan:     1.  Type 2 diabetes- Doing well with glucose control (A1c 5.6%), but he has been having low blood sugars.  Does not like switch from novolog to fiasp, so will switch back.  Explained that hypoglycemia likely is due to him pre-bolusing with fiasp, but fiasp is designed to be taken at the START of the meal.  He will do this when he uses fiasp.  Reassured him that his glucose is under good control.  We made the following plan today (instructions given to patient):      Lower tresiba to 14 units    Target glucose is .     Take Novolog before meals and bedtime.     Please let me know if you are having low blood sugars less than 70 or over 250 mg/dL.      If you have concerns, please send me a [employer] message M-Th, call the clinic at [phone], or call [phone] after hours/weekends and ask to speak with the endocrinologist on call.      2.  Risk factors-     Retinopathy:  Yes.  Had eye exam within last year.   Nephropathy:  BP historically well controlled.+ albuminuria.  Creatinine stable.   Neuropathy: Yes.   Feet: OK, no ulcers. +neuropathy Sees podiatry today. Wears diabetic shoe.   Lipids:  TG were high at last check. Goal LDL <70. Patient taking rosuvastatin     3.  F/U in 6 months, sooner  with concerns.    22 minutes spent on the date of the encounter doing chart review, review of test results, review and interpretation of glucose data, patient visit and documentation, counseling/coordination of care, and discussion of follow up plan for worsening hyper and hypoglycemia.  The patient understood and is satisfied with today's visit.          Frannie Vasquez PA-C, MPAS   [facility]  Department of Medicine  Division of Endocrinology and Diabetes                Again, thank you for allowing me to participate in the care of your patient.      Sincerely,    Frannie Vasquez PA-C

## 2024-11-18 NOTE — PATIENT INSTRUCTIONS
Lower tresiba to 14 units    Target glucose is .     Take Novolog before meals and bedtime.     Please let me know if you are having low blood sugars less than 70 or over 250 mg/dL.      If you have concerns, please send me a [employer] message M-Th, call the clinic at [phone], or call [phone] after hours/weekends and ask to speak with the endocrinologist on call.

## 2024-11-20 ENCOUNTER — TELEPHONE (OUTPATIENT)
Dept: PHARMACY | Facility: CLINIC | Age: 59
End: 2024-11-20
Payer: MEDICARE

## 2024-11-20 NOTE — TELEPHONE ENCOUNTER
Clinical Pharmacy Consult:                                                      Transplant Specific:  60 Month Post Transplant Medication Call  Date of Transplant: 11/11/2019  Type of Transplant: liver  First Transplant: yes  History of rejection: no    Immunosuppression Regimen   MMF 500mg qAM & 500mg qPM and Prednisone 5mg qAM  Patient specific goal: Not followed  Pt adherent to lab draws: yes  Scr:   Lab Results   Component Value Date    CR 1.16 05/21/2020     Side effects: None    Prophylactic Medications  Antibacterial:  Completed    Antifungal: Not needed thus far    Antiviral: Valcyte completed     Acid Reducer: Protonix (Pantoprazole)  Scheduled Reviewed Date: up to MD    Thrombosis Prevention: Aspirin 81 mg 2 PO daily  Scheduled Discontinue Date: managed by clinic    Blood Pressure Management  Frequency of home Blood Pressure checks: rarely  Most recent home BP:  150/70  Patient Blood pressure goal: <140/90  Patient blood pressure at goal:  No    Hospitalizations/ER visits since last assessment: 0    Med rec/DUR performed: yes  Med Rec Discrepancies: no        11/18/2022     1:00 PM   Medication adherence flowsheet   Patient medication administration: Has assistance with medications   Patient estimated adherence level: %   Pharmacist assessment of adherence: Good   Patient reported doses missed per week: 0-1   Facilitators to medication adherence  Medication dosing chart;Pill box;Schedule/routine;Cell phone;Caregiver assistance          11/18/2022     1:00 PM   Medication access flowsheet   Number of pharmacies used: 1   Pharmacy: [facility]   Enrolled in [facility]? Yes      Spoke to Miller today via phone. He reports that he is doing well in regards to his transplant. He is dealing with switching care of a few doctors and his ongoing chemotherapy but otherwise everything is going well. Miller denies any ED visits or hospitalizations in the past year.     Miller reports he doesn't  check his blood pressure very often anymore, but it was 150/70 the other day and he notes that's higher than usual. He has an appointment with cardiology tomorrow and they will be managing his blood pressure moving forward and he thinks he will have to start checking it more often.    No other questions or concerns.    Time spent: 15 minutes.    Parker Oquendo, PharmD  [phone]

## 2024-11-20 NOTE — PROGRESS NOTES
"    [facility]    Patient Name: Frandy Workman   : 1965   Date of Visit: 2024  Primary Care Physician: Lamin Ortiz MD         Frandy Workman is a pleasant 59 year old male, who presents for 6 month follow up. Prior history significant for CAD w/ NSTEMI s/p CABG (LIMA to LAD and SVG to OM2, ), HTN, HLD, DVT of peroneal vein (on Eliquis), cirrhosis due to ESTRADA s/p orthotopic liver transplant 2019, type 2 diabetes mellitus, and CKD stage 3.    Today in clinic, patient reports feeling good from a cardiac standpoint. Says most of his concerns are unrelated to his heart health. He says his kidney provider recently moved out of clinic to full time teaching, and he is having a hard time getting in to see someone. He has been noticing some intermittent bilateral ankle edema and tightness across his abdomen. He says he sued to have a lot of ascites and abdominal swelling pre and also post live transplant, but it had gotten better and seems to be coming back a bit. He denies any issues with SOB related to the edema. Says he had RSV last year and has since been using an inhaler every other day, that relieves any symptoms he has.    Was having problems with his feet and shoes, getting new insoles fitted. Normally does 10,000-14,000 steps every day, but this has decreased with his shoe problems. He has 4 flights of stairs at home that he says he typically has no issue with.     Also c/o of frequent nocturia, has not been seen by PCP or urology for this concern.    Denies any chest pain , palpitations, dizziness.    Home BPs: typically close to 140's \"like clinic numbers today\", does not check regularly    Home Wts: he says his weight has gone up a little but it is where endocrine would like his weigh to be (close to 170 lbs)      Current Cardiac Medications  Eliquis 5 mg BID  Jardiance 10 mg daily  Lisinopril 5 mg daily  Toprol 25 mg daily  Procardia 60 mg daily  Omega-3 2g BID  Rosuvastatin 20 " mg daily    Interval History 5/2/24  Patient reported having an infection for past 2 weeks that he was on antibiotics for. Was less active during his illness and felt short of breath going upstairs the first week, but this had been improving over the second week of illness. Denied any cardiac symptoms. Lipid panel was due to be updated as patient's rosuvastatin was increased after 2023 clinic visit. Also started on omega-3 2g BID for elevated TG.       PAST MEDICAL HISTORY:  Past Medical History:   Diagnosis Date    Anemia 2013    Arthritis     BPH (benign prostatic hyperplasia)     CAD (coronary artery disease) 04/01/2019    Cholelithiasis     Conductive hearing loss 08/16/2017    Depressive disorder 1986    Suffer effects throughout life    Gastroesophageal reflux disease 12/01/2014    HCC (hepatocellular carcinoma) (H) 01/22/2019    History of blood transfusion 2019    Had blood transfussion until Dec 2022    History of diabetic retinopathy 07/2018    HTN (hypertension) 11/20/2019    Hyperlipidemia     Liver cirrhosis secondary to ESTRADA (H)     Liver transplanted (H) 11/11/2019    Portal vein thrombosis 04/11/2019    SCC (squamous cell carcinoma) 02/15/2023    02/15/23:  Left temporal scalp    Type II diabetes mellitus (H)        PAST SURGICAL HISTORY:  Past Surgical History:   Procedure Laterality Date    ABDOMEN SURGERY  11/11/2019    Had Liver Transplant    BIOPSY  12/2022    Had biopsy done will have additional procedure 2/15/2023    BYPASS GRAFT ARTERY CORONARY N/A 07/14/2021    Procedure: median sternotomy, on cardiopulmonary bypass, CORONARY ARTERY BYPASS GRAFT (CABG) x2 with left greater saphenous vein endoscopic harvest and left internal mammery artery harvest;  Surgeon: Tom Zapata MD;  Location[facility]    CARDIAC SURGERY  7/2021    Heart Graph. and bypass surgery    CHOLECYSTECTOMY  11/11/2022    Removed with Liver Transplant    COLONOSCOPY      2015    COLONOSCOPY N/A 12/06/2019     Procedure: COLONOSCOPY, WITH POLYPECTOMY AND BIOPSY;  Surgeon: Adam Morton MD;  [facility]    CV CENTRAL VENOUS CATHETER PLACEMENT N/A 07/12/2021    Procedure: Central Venous Catheter Placement;  Surgeon: Fermin Polanco MD;  Location:  [facility] CARDIAC CATH LAB    CV CORONARY ANGIOGRAM N/A 07/12/2021    Procedure: Coronary Angiogram;  Surgeon: Fermin Polanco MD;  Location:  HEART CARD[facility]AC CATH LAB    CV HEART CATHETERIZATION WITH POSSIBLE INTERVENTION N/A 02/26/2019    Procedure: CORS;  Surgeon: Jagdish Hoyt MD;  Location:  HEART CARDIAC CATH LAB    CV INTRA AORTIC BALLOON N/A 07/12/2021    Procedure: Intra Aortic Balloon Pump Insertion;  Surgeon: Fermin Polanco MD;  Location: [facility] CARDIAC CATH LAB    ESOPHAGOSCOPY, GASTROSCOPY, DUODENOSCOPY (EGD), COMBINED N/A 11/17/2016    Procedure: COMBINED ESOPHAGOSCOPY, GASTROSCOPY, DUODENOSCOPY (EGD);  Surgeon: Santi Rosas MD;  [facility]    ESOPHAGOSCOPY, GASTROSCOPY, DUODENOSCOPY (EGD), COMBINED N/A 11/17/2017    Procedure: COMBINED ESOPHAGOSCOPY, GASTROSCOPY, DUODENOSCOPY (EGD);  EGD;  Surgeon: Santi Rosas MD;  [facility]    ESOPHAGOSCOPY, GASTROSCOPY, DUODENOSCOPY (EGD), COMBINED N/A 12/28/2018    Procedure: EGD;  Surgeon: Santi Rosas MD;  [facility]    ESOPHAGOSCOPY, GASTROSCOPY, DUODENOSCOPY (EGD), COMBINED N/A 12/06/2019    Procedure: ESOPHAGOGASTRODUODENOSCOPY, WITH BIOPSY;  Surgeon: Adam Morton MD;  [facility]    ESOPHAGOSCOPY, GASTROSCOPY, DUODENOSCOPY (EGD), COMBINED N/A 02/13/2020    Procedure: ESOPHAGOGASTRODUODENOSCOPY (EGD);  Surgeon: Santi Rosas MD;  [facility]    EXCISE MASS ABDOMEN N/A 07/13/2023    Procedure: Laparoscopic lysis of adhesions, laparoscopic resection of abdominal mass;  Surgeon: Ruiz Chu MD;  [facility]    HEAD & NECK SURGERY      12/2017 at [facility].     IMPLANT GOLD WEIGHT EYELID  Right 2017    Procedure: IMPLANT WEIGHT EYELID;  Right Upper Eyelid Weight, right tarsal strip lower eyelid;  Surgeon: Milana Malave MD;  [facility]    IR CHEMO EMBOLIZATION  2019    KNEE SURGERY Left     ORTHOPEDIC SURGERY      PAROTIDECTOMY, RADICAL NECK DISSECTION Right 2017    Procedure: PAROTIDECTOMY, RADICAL NECK DISSECTION;  Right Superfacial Parotidectomy , Facial nerve repair. with NIM facial nerve monitor.;  Surgeon: Asiya Morgan MD;  [facility]    PHACOEMULSIFICATION CLEAR CORNEA WITH STANDARD INTRAOCULAR LENS IMPLANT Right 2023    Procedure: PHACOEMULSIFICATION, COMPLEX CATARACT, WITH INTRAOCULAR LENS IMPLANT WITH TRYPAN RIGHT EYE;  Surgeon: Enriqueta Martin MD;  [facility]    PHACOEMULSIFICATION WITH STANDARD INTRAOCULAR LENS IMPLANT Left 2023    Procedure: PHACOEMULSIFICATION, COMPLEX CATARACT, WITH STANDARD INTRAOCULAR LENS IMPLANT INSERTION LEFT EYE;  Surgeon: Enriqueta Martin MD;  [facility]    PICC INSERTION Left 2017    4fr SL BioFlo PICC, 44cm in the L basilic vein w/ tip in the low SVC    RETURN LIVER TRANSPLANT N/A 2019    Procedure: Exploratory laparotomy, hematoma evacuation, abdominal washout;  Surgeon: Александр Ramos MD;  [facility]    TRANSPLANT LIVER RECIPIENT  DONOR N/A 2019    Procedure: TRANSPLANT, LIVER, RECIPIENT,  DONOR;  Surgeon: Александр Ramos MD;  [facility]    VASCULAR SURGERY         FAMILY HISTORY:  Family History   Problem Relation Age of Onset    Skin Cancer Mother     Cancer Mother         mastectomy    Diabetes Mother          3/2016    Cerebrovascular Disease Mother         Passed away in Feb of this year, 80 years old.    Thyroid Disease Mother     Depression Mother     Breast Cancer Mother     Obesity Mother         280 pounds  from this and complications from D    Pancreatic Cancer Father 60    Prostate Cancer Father          Currently in Remission    Macular Degeneration Father     Glaucoma Father     Skin Cancer Father     Coronary Artery Disease Father         Started in his 70's  at 88 in 2023    Colon Cancer Father     Thyroid Disease Sister     Depression Sister     Asthma Sister     Sleep Apnea Brother     Colorectal Cancer Maternal Grandmother     Cancer Maternal Grandmother     Substance Abuse Maternal Grandmother         Alcohol    Prostate Cancer Maternal Grandfather     Substance Abuse Maternal Grandfather         Alcohol    Colorectal Cancer Paternal Grandmother     Asthma Sister         Had since birth    Liver Disease No family hx of     Melanoma No family hx of     Anesthesia Reaction No family hx of     Deep Vein Thrombosis (DVT) No family hx of        SOCIAL HISTORY:  Social History     Socioeconomic History    Marital status:     Highest education level: Bachelor's degree (e.g., BA, AB, BS)   Tobacco Use    Smoking status: Former     Types: Dip, chew, snus or snuff     Passive exposure: Past    Smokeless tobacco: Former     Types: Chew     Quit date: 10/31/2017    Tobacco comments:     Quit [place] after Doctor told me in  that if I didn't I would   Vaping Use    Vaping status: Never Used   Substance and Sexual Activity    Alcohol use: No     Comment: quit 1996    Drug use: No    Sexual activity: Not Currently     Partners: Female     Birth control/protection: Condom   Other Topics Concern    Parent/sibling w/ CABG, MI or angioplasty before 65F 55M? No   Social History Narrative    Prior , [place]     Social Drivers of Health     Financial Resource Strain: Low Risk  (2024)    Financial Resource Strain     Within the past 12 months, have you or your family members you live with been unable to get utilities (heat, electricity) when it was really needed?: No   Food Insecurity: Low Risk  (2024)    Food Insecurity     Within the past 12 months, did you worry that your  food would run out before you got money to buy more?: No     Within the past 12 months, did the food you bought just not last and you didn t have money to get more?: No   Transportation Needs: Low Risk  (1/23/2024)    Transportation Needs     Within the past 12 months, has lack of transportation kept you from medical appointments, getting your medicines, non-medical meetings or appointments, work, or from getting things that you need?: No   Physical Activity: Insufficiently Active (10/13/2020)    Exercise Vital Sign     Days of Exercise per Week: 1 day     Minutes of Exercise per Session: 60 min   Stress: Stress Concern Present (10/13/2020)    [employer] - Occupational Stress Questionnaire     Feeling of Stress : To some extent   Social Connections: Socially Isolated (10/13/2020)    Social Connection and Isolation Panel [NHANES]     Frequency of Communication with Friends and Family: Once a week     Frequency of Social Gatherings with Friends and Family: Once a week     Attends Confucianism Services: Never     Active Member of Clubs or Organizations: No     Attends Club or Organization Meetings: Never     Marital Status:    Interpersonal Safety: Low Risk  (4/30/2024)    Interpersonal Safety     Do you feel physically and emotionally safe where you currently live?: Yes     Within the past 12 months, have you been hit, slapped, kicked or otherwise physically hurt by someone?: No     Within the past 12 months, have you been humiliated or emotionally abused in other ways by your partner or ex-partner?: No   Housing Stability: Low Risk  (1/23/2024)    Housing Stability     Do you have housing? : Yes     Are you worried about losing your housing?: No       MEDICATIONS:  Current Outpatient Medications   Medication Sig Dispense Refill    acetaminophen (TYLENOL) 325 MG tablet TAKE 1 TABLET BY MOUTH EVERY SIX HOURS AS NEEDED FOR MILD PAIN 100 tablet 3    albuterol (PROAIR HFA/PROVENTIL  HFA/VENTOLIN HFA) 108 (90 Base) MCG/ACT inhaler INHALE 2 PUFFS INTO THE LUNGS EVERY 4 HOURS AS NEEDED FOR SHORTNESS OF BREATH, WHEEZING OR COUGH 18 g 0    ammonium lactate (AMLACTIN) 12 % external cream APPLY TOPICALLY DAILY AS NEEDED FOR DRY SKIN (ON FEET) 140 g 5    benzoyl peroxide 5 % external liquid Use topically in showers as a body wash 226 g 9    blood glucose (NO BRAND SPECIFIED) lancets standard Use to test blood sugar 1 times daily or as directed. 100 each 11    Continuous Glucose Sensor (FREESTYLE CLAUDIA 2 SENSOR) MISC 1 each See Admin Instructions Change every 14 days. 7 each 3    Continuous Glucose Sensor (FREESTYLE CLAUDIA 3 PLUS SENSOR) MISC Change every 15 days. 6 each 1    diphenoxylate-atropine (LOMOTIL) 2.5-0.025 MG tablet Take 1 tablet by mouth 4 times daily as needed for diarrhea. 60 tablet 0    ELIQUIS ANTICOAGULANT 5 MG tablet TAKE 1 TABLET BY MOUTH TWICE A DAY 60 tablet 3    empagliflozin (JARDIANCE) 10 MG TABS tablet Take 1 tablet (10 mg) by mouth daily 30 tablet 11    insulin aspart (NOVOLOG FLEXPEN) 100 UNIT/ML pen INJECT 5-10U SUBCUTANEOUSLY FOUR TIMES A DAY ( WITH MEALS AND EVERY NIGHT ) ONE UNIT :8CARB BEFORE MEALS . ALSO ADD ONE UNIT : 50MG/DL 15 mL 11    insulin degludec (TRESIBA FLEXTOUCH) 100 UNIT/ML pen INJECT 14 UNITS SUBCUTANEOUSLY ONCE DAILY 15 mL 3    insulin pen needle (ULTICARE MICRO) 32G X 4 MM miscellaneous USE 5 PER  each 3    ketoconazole (NIZORAL) 2 % external shampoo Apply thin layer topically to scalp in shower (leave on 5 min prior to rinse); may also use as a body wash 120 mL 11    lamiVUDine (EPIVIR) 100 MG tablet TAKE 1 TABLET BY MOUTH ONCE DAILY 90 tablet 2    lisinopril (ZESTRIL) 10 MG tablet Take 1 tablet (10 mg) by mouth daily. 30 tablet 11    loperamide (IMODIUM) 2 MG capsule TAKE ONE TO TWO CAPSULES BY MOUTH FOUR TIMES A DAY AS NEEDED FOR DIARRHEA (DO NOT TAKE MORE THAN 8 CAPSULES PER DAY). 120 capsule 1    MAGNESIUM OXIDE 400 (240 Mg) MG tablet TAKE 1  TABLET BY MOUTH DAILY 30 tablet 5    metoprolol succinate ER (TOPROL XL) 25 MG 24 hr tablet TAKE 1 TABLET BY MOUTH DAILY 30 tablet 3    Multiple Vitamin (DAILY-GLADIS MULTIVITAMIN) TABS TAKE 1 TABLET BY MOUTH ONCE DAILY 30 tablet 11    mycophenolate (GENERIC EQUIVALENT) 250 MG capsule TAKE TWO CAPSULES BY MOUTH EVERY 12 HOURS 120 capsule 11    NIFEdipine ER OSMOTIC (PROCARDIA XL) 60 MG 24 hr tablet TAKE 1 TABLET(60 MG) BY MOUTH TWICE DAILY 6 tablet 0    NOVOLOG FLEXPEN 100 UNIT/ML soln Use as directed up to 50 units daily. 45 mL 3    omega 3 1000 MG CAPS Take 2,000 mg by mouth 2 times daily. 120 capsule 11    ondansetron (ZOFRAN ODT) 8 MG ODT tab Take 1 tablet (8 mg) by mouth every 8 hours as needed for nausea 15 tablet 3    pantoprazole (PROTONIX) 40 MG EC tablet TAKE 1 TABLET BY MOUTH ONCE DAILY BEFORE BREAKFAST 90 tablet 3    PHOSPHORUS TABLET 250  MG per tablet TAKE 1 TABLET BY MOUTH 4 TIMES DAILY 120 tablet 1    prednisoLONE acetate (PRED FORTE) 1 % ophthalmic suspension       predniSONE (DELTASONE) 5 MG tablet TAKE ONE TABLET BY MOUTH ONCE DAILY 30 tablet 11    rosuvastatin (CRESTOR) 20 MG tablet Take 1 tablet (20 mg) by mouth daily. 30 tablet 11    sodium zirconium cyclosilicate (LOKELMA) 10 g PACK packet Take 10 gram 3x/day for 2 days then 10 grams daily. (Patient taking differently: Take 10 g by mouth daily. Take 10 gram 3x/day for 2 days then 10 grams daily.) 30 packet 11    SORAfenib (NEXAVAR) 200 MG tablet Take 1 tablet (200 mg) by mouth 2 times daily. On an empty stomach 1 hour before or 2 hours after a meal. 60 tablet 0    tamsulosin (FLOMAX) 0.4 MG capsule TAKE 1 CAPSULE BY MOUTH ONCE DAILY 30 capsule 11    UltiCare Alcohol Swabs 70 % PADS 1 each by Other route 4 times daily 400 each 1    vitamin D3 (CHOLECALCIFEROL) 50 mcg (2000 units) tablet TAKE 1 TABLET BY MOUTH ONCE DAILY 30 tablet 8     Current Facility-Administered Medications   Medication Dose Route Frequency Provider Last Rate Last Admin     aflibercept (EYLEA) injection prefilled syringe 2 mg  2 mg Intravitreal Q28 Days Enriqueta Martin MD   2 mg at 05/31/23 1144    dexamethasone (OZURDEX) intravitreal implant 0.7 mg  0.7 mg Intravitreal Q2 Months Enriqueta Martin MD        dexamethasone (OZURDEX) intravitreal implant 0.7 mg  0.7 mg Intravitreal Q2 Months Enriqueta Martin MD faricimab-svoa (VABYSMO) intravitreal injection 6 mg  6 mg Intravitreal q28 days Enriqueta Martin MD faricimab-svoa (VABYSMO) intravitreal injection 6 mg  6 mg Intravitreal q28 days Enriqueta Martin MD faricimab-svoa (VABYSMO) intravitreal injection 6 mg  6 mg Intravitreal q28 days Enriqueta Martin MD   6 mg at 03/06/24 1001    lidocaine (PF) (XYLOCAINE) injection 1 mL  1 mL Ophthalmic Q2 Months Enriqueta Martin MD            ALLERGIES:     Allergies   Allergen Reactions    Codeine Other (See Comments)     Cannot take due to liver  Cannot tolerate oral narcotics    Seasonal Allergies      Sneezing, coughing, runny and itchy eyes       ROS:  A complete 10-point ROS was negative except as above.    PHYSICAL EXAM:  BP (!) 147/75 (BP Location: Left arm, Patient Position: Chair, Cuff Size: Adult Regular)   Pulse 85   SpO2 99%   Gen: alert, interactive, NAD  Neck: supple, no JVD  CV: RRR, no m/r/g  Chest: CTAB, no wheezes or crackles  Abd: nontender, mildly distended  Ext: trace alba. ankle edema    LABS:    CMP  Last Comprehensive Metabolic Panel:  Sodium   Date Value Ref Range Status   10/23/2024 139 135 - 145 mmol/L Final   06/28/2021 137 133 - 144 mmol/L Final     Potassium   Date Value Ref Range Status   10/23/2024 4.5 3.4 - 5.3 mmol/L Final   07/20/2022 4.7 3.4 - 5.3 mmol/L Final   06/28/2021 4.6 3.4 - 5.3 mmol/L Final     Potassium POCT   Date Value Ref Range Status   08/10/2023 7.7 (HH) 3.4 - 5.3 mmol/L Final     Comment:     Chart Critical result. Doctor notified     Chloride   Date Value Ref Range  Status   10/23/2024 103 98 - 107 mmol/L Final   07/20/2022 105 94 - 109 mmol/L Final   06/28/2021 107 94 - 109 mmol/L Final     Carbon Dioxide   Date Value Ref Range Status   06/28/2021 25 20 - 32 mmol/L Final     Carbon Dioxide (CO2)   Date Value Ref Range Status   10/23/2024 23 22 - 29 mmol/L Final   07/20/2022 26 20 - 32 mmol/L Final     Anion Gap   Date Value Ref Range Status   10/23/2024 13 7 - 15 mmol/L Final   07/20/2022 7 3 - 14 mmol/L Final   06/28/2021 5 3 - 14 mmol/L Final     Glucose   Date Value Ref Range Status   10/23/2024 130 (H) 70 - 99 mg/dL Final   07/20/2022 269 (H) 70 - 99 mg/dL Final   06/28/2021 208 (H) 70 - 99 mg/dL Final     GLUCOSE BY METER POCT   Date Value Ref Range Status   08/16/2023 185 (H) 70 - 99 mg/dL Final     Urea Nitrogen   Date Value Ref Range Status   10/23/2024 24.7 (H) 8.0 - 23.0 mg/dL Final   07/20/2022 32 (H) 7 - 30 mg/dL Final   06/28/2021 33 (H) 7 - 30 mg/dL Final     Creatinine   Date Value Ref Range Status   10/23/2024 1.52 (H) 0.67 - 1.17 mg/dL Final   06/28/2021 1.62 (H) 0.66 - 1.25 mg/dL Final     GFR Estimate   Date Value Ref Range Status   10/23/2024 52 (L) >60 mL/min/1.73m2 Final     Comment:     eGFR calculated using 2021 CKD-EPI equation.   06/28/2021 46 (L) >60 mL/min/[1.73_m2] Final     Comment:     Non  GFR Calc  Starting 12/18/2018, serum creatinine based estimated GFR (eGFR) will be   calculated using the Chronic Kidney Disease Epidemiology Collaboration   (CKD-EPI) equation.       GFR, ESTIMATED POCT   Date Value Ref Range Status   09/05/2024 58 (L) >60 mL/min/1.73m2 Final     Calcium   Date Value Ref Range Status   10/23/2024 7.5 (L) 8.8 - 10.4 mg/dL Final     Comment:     Reference intervals for this test were updated on 7/16/2024 to reflect our healthy population more accurately. There may be differences in the flagging of prior results with similar values performed with this method. Those prior results can be interpreted in the context  of the updated reference intervals.   06/28/2021 9.1 8.5 - 10.1 mg/dL Final     Bilirubin Total   Date Value Ref Range Status   10/23/2024 0.7 <=1.2 mg/dL Final   06/28/2021 0.5 0.2 - 1.3 mg/dL Final     Alkaline Phosphatase   Date Value Ref Range Status   10/23/2024 103 40 - 150 U/L Final   06/28/2021 98 40 - 150 U/L Final     ALT   Date Value Ref Range Status   10/23/2024 24 0 - 70 U/L Final   06/28/2021 20 0 - 70 U/L Final     AST   Date Value Ref Range Status   10/23/2024 27 0 - 45 U/L Final   06/28/2021 9 0 - 45 U/L Final       TROP  Lab Results   Component Value Date    TROPI <0.015 07/01/2020    TROPI <0.015 01/24/2019    TROPONIN 1.474 () 07/15/2021    TROPONIN 0.167 () 07/14/2021    TROPONIN 0.180 () 07/13/2021    TROPONIN 0.191 () 07/13/2021    TROPONIN 0.200 () 07/13/2021       CBC  CBC RESULTS:   Recent Labs   Lab Test 10/23/24  1044   WBC 6.2   RBC 3.80*   HGB 11.3*   HCT 36.1*   MCV 95   MCH 29.7   MCHC 31.3*   RDW 16.1*          LIPIDS  Recent Labs   Lab Test 08/02/24  0843 06/04/24  1040   CHOL 139 180   HDL 37* 43   LDL 53 94   TRIG 245* 217*       TSH  TSH   Date Value Ref Range Status   09/05/2024 1.32 0.30 - 4.20 uIU/mL Final   04/25/2022 1.24 0.40 - 4.00 mU/L Final   01/28/2021 0.91 0.40 - 4.00 mU/L Final       HBA1C  Lab Results   Component Value Date    A1C 5.6 09/05/2024    A1C 5.6 05/13/2024    A1C 5.7 12/18/2023    A1C 6.3 06/14/2023    A1C 6.9 12/14/2022    A1C 6.0 01/10/2022    A1C 6.0 12/30/2020    A1C 6.3 06/13/2020    A1C 6.6 08/08/2018    A1C 6.5 06/09/2017    A1C 7.8 10/25/2016         CARDIAC DATA:   Echo 3/15/22:  Left ventricular function is normal.The ejection fraction is > 65%. Abnormal  non-specific septal motion is present.  Global right ventricular function is normal. The RV is not clearly visualized  but the size and function are probably normal. The RV FAC is 40%.  There are no significant valvular abnormalities.  The estimated PA systolic pressure is 29  mmHg.  IVC diameter <2.1 cm collapsing >50% with sniff suggests a normal RA pressure  of 3 mmHg.  This study was compared with the study from 01/11/2022. There is no  significant change in the biventricular size/function upon direct visual  comparison.    Cardiac Catheterization 7/12/21:  Severe distal Left main disease at trifurcation of LCx, LAD, and Ramus.  IABP inserted in the RFA.  CVC inserted in the RIJ.    Urgent cardiovascular surgery consultation for consideration for bypass surgery.         ASSESSMENT/PLAN:  In summary, Frandy Workman is here today with the following -      # CAD w/ NSTEMI s/p CABG (LIMA to LAD and SVG to OM2) in 2021  # Dyslipidemia  # HTN, goal <130/80  # BLE Edema  # Abdominal Swelling    No angina post-CABG. Blood pressures not well controlled. He has trace ankle edema and a mildly distended abdomen on exam today. Prior echo in 2022 with EF >65%. Discussed with patient and he would like to proceed with updating ultrasound to rule out cardiac etiology for recent edema.  Most recent lipid panel 8/2/24 with Total Chol 139,  and LDL 53. Significantly improved from 2023 values with increased statin dosing and addition of omega-3.    - Continue current medications:  Toprol 25 mg daily  Procardia 60 mg daily  Omega-3 2g BID  Rosuvastatin 20 mg daily  - Increase Lisinopril 10 mg daily  - Schedule echocardiogram  - Check BP regularly at home  - Continue lifestyle modifications for risk factor management: maintain healthy weight, aerobic and strength exercise with goal of 150 minutes per week, healthy diet with emphasis in fruits, vegetables and fiber, and avoid processed and prepackaged foods     # T2DM: On Jardiance, followed by endocrinology  # DVT of left peroneal vein: On eliquis, followed by oncology      Follow up:  - in 1 year with lipids prior or sooner if indicated by testing      SHANIQUE Landon, FNP-C  [facility] General Cardiology  Securely message with Intio   Text page via  [facility] Paging/Directory          Chart review time: 10 minutes  Visit time: 31 minutes  Chart completion/documentation: 8 minutes  Total time spent: 49 minutes

## 2024-11-21 ENCOUNTER — OFFICE VISIT (OUTPATIENT)
Dept: CARDIOLOGY | Facility: CLINIC | Age: 59
End: 2024-11-21
Payer: MEDICARE

## 2024-11-21 VITALS — DIASTOLIC BLOOD PRESSURE: 73 MMHG | SYSTOLIC BLOOD PRESSURE: 128 MMHG | OXYGEN SATURATION: 99 % | HEART RATE: 85 BPM

## 2024-11-21 DIAGNOSIS — R19.00 ABDOMINAL SWELLING: ICD-10-CM

## 2024-11-21 DIAGNOSIS — R60.0 BILATERAL LOWER EXTREMITY EDEMA: Primary | ICD-10-CM

## 2024-11-21 DIAGNOSIS — E11.9 WELL CONTROLLED TYPE 2 DIABETES MELLITUS (H): ICD-10-CM

## 2024-11-21 DIAGNOSIS — E78.5 HYPERLIPIDEMIA LDL GOAL <100: ICD-10-CM

## 2024-11-21 DIAGNOSIS — I12.9 HYPERTENSION, RENAL: ICD-10-CM

## 2024-11-21 DIAGNOSIS — Z95.1 S/P CABG (CORONARY ARTERY BYPASS GRAFT): ICD-10-CM

## 2024-11-21 DIAGNOSIS — E11.3293 TYPE 2 DIABETES MELLITUS WITH MILD NONPROLIFERATIVE RETINOPATHY OF BOTH EYES WITHOUT MACULAR EDEMA, UNSPECIFIED WHETHER LONG TERM INSULIN USE (H): ICD-10-CM

## 2024-11-21 DIAGNOSIS — I25.10 CORONARY ARTERY DISEASE INVOLVING NATIVE CORONARY ARTERY OF NATIVE HEART WITHOUT ANGINA PECTORIS: ICD-10-CM

## 2024-11-21 PROCEDURE — G0463 HOSPITAL OUTPT CLINIC VISIT: HCPCS

## 2024-11-21 RX ORDER — INSULIN DEGLUDEC 100 U/ML
INJECTION, SOLUTION SUBCUTANEOUS
Qty: 15 ML | Refills: 3 | Status: SHIPPED | OUTPATIENT
Start: 2024-11-21

## 2024-11-21 RX ORDER — OMEGA-3 FATTY ACIDS/FISH OIL 300-1000MG
2000 CAPSULE ORAL 2 TIMES DAILY
Qty: 120 CAPSULE | Refills: 11 | Status: SHIPPED | OUTPATIENT
Start: 2024-11-21

## 2024-11-21 RX ORDER — LISINOPRIL 10 MG/1
10 TABLET ORAL DAILY
Qty: 30 TABLET | Refills: 11 | Status: SHIPPED | OUTPATIENT
Start: 2024-11-21

## 2024-11-21 RX ORDER — ROSUVASTATIN CALCIUM 20 MG/1
20 TABLET, COATED ORAL DAILY
Qty: 30 TABLET | Refills: 11 | Status: SHIPPED | OUTPATIENT
Start: 2024-11-21

## 2024-11-21 RX ORDER — INSULIN ASPART 100 [IU]/ML
INJECTION, SOLUTION INTRAVENOUS; SUBCUTANEOUS
Qty: 45 ML | Refills: 3 | Status: SHIPPED | OUTPATIENT
Start: 2024-11-21

## 2024-11-21 ASSESSMENT — PAIN SCALES - GENERAL: PAINLEVEL_OUTOF10: NO PAIN (0)

## 2024-11-21 NOTE — PATIENT INSTRUCTIONS
You were seen today in the Cardiovascular Clinic at the [facility] by:       SHANIQUE MEJIA CNP    Your visit summary and instructions are as follows:    Increase Lisinopril 10 m7y  Schedule echocardiogram  Check BP regularly at home      Return to cardiology clinic in 1 year with lipids prior or sooner if indicated by testing     Thank you for your visit today!     Please MyChart message me or call my nurse if you have any questions or concerns.      During Business Hours:  [phone], option # 1 (University) then option # 4 (medical questions) and ask to speak with my nurse.     After hours, weekends or holidays:   [phone], Option #4  Ask to speak to the On-Call Cardiologist. Inform them you are a cardiology patient at the [facility].

## 2024-11-21 NOTE — LETTER
"2024      RE: Frandy Workman  [address] [facility] [address]       Dear Colleague,    Thank you for the opportunity to participate in the care of your patient, Frandy Workman, at the [facility] [place] [facility]. Please see a copy of my visit note below.        [facility]    Patient Name: Frandy Workman   : 1965   Date of Visit: 2024  Primary Care Physician: Lamin Ortiz MD         Frandy Workman is a pleasant 59 year old male, who presents for 6 month follow up. Prior history significant for CAD w/ NSTEMI s/p CABG (LIMA to LAD and SVG to OM2, ), HTN, HLD, DVT of peroneal vein (on Eliquis), cirrhosis due to ESTRADA s/p orthotopic liver transplant 2019, type 2 diabetes mellitus, and CKD stage 3.    Today in clinic, patient reports feeling good from a cardiac standpoint. Says most of his concerns are unrelated to his heart health. He says his kidney provider recently moved out of clinic to full time teaching, and he is having a hard time getting in to see someone. He has been noticing some intermittent bilateral ankle edema and tightness across his abdomen. He says he sued to have a lot of ascites and abdominal swelling pre and also post live transplant, but it had gotten better and seems to be coming back a bit. He denies any issues with SOB related to the edema. Says he had RSV last year and has since been using an inhaler every other day, that relieves any symptoms he has.    Was having problems with his feet and shoes, getting new insoles fitted. Normally does 10,000-14,000 steps every day, but this has decreased with his shoe problems. He has 4 flights of stairs at home that he says he typically has no issue with.     Also c/o of frequent nocturia, has not been seen by PCP or urology for this concern.    Denies any chest pain , palpitations, dizziness.    Home BPs: typically close to 140's \"like " "clinic numbers today\", does not check regularly    Home Wts: he says his weight has gone up a little but it is where endocrine would like his weigh to be (close to 170 lbs)      Current Cardiac Medications  Eliquis 5 mg BID  Jardiance 10 mg daily  Lisinopril 5 mg daily  Toprol 25 mg daily  Procardia 60 mg daily  Omega-3 2g BID  Rosuvastatin 20 mg daily    Interval History 5/2/24  Patient reported having an infection for past 2 weeks that he was on antibiotics for. Was less active during his illness and felt short of breath going upstairs the first week, but this had been improving over the second week of illness. Denied any cardiac symptoms. Lipid panel was due to be updated as patient's rosuvastatin was increased after 2023 clinic visit. Also started on omega-3 2g BID for elevated TG.       PAST MEDICAL HISTORY:  Past Medical History:   Diagnosis Date     Anemia 2013     Arthritis      BPH (benign prostatic hyperplasia)      CAD (coronary artery disease) 04/01/2019     Cholelithiasis      Conductive hearing loss 08/16/2017     Depressive disorder 1986    Suffer effects throughout life     Gastroesophageal reflux disease 12/01/2014     HCC (hepatocellular carcinoma) (H) 01/22/2019     History of blood transfusion 2019    Had blood transfussion until Dec 2022     History of diabetic retinopathy 07/2018     HTN (hypertension) 11/20/2019     Hyperlipidemia      Liver cirrhosis secondary to ESTRADA (H)      Liver transplanted (H) 11/11/2019     Portal vein thrombosis 04/11/2019     SCC (squamous cell carcinoma) 02/15/2023    02/15/23:  Left temporal scalp     Type II diabetes mellitus (H)        PAST SURGICAL HISTORY:  Past Surgical History:   Procedure Laterality Date     ABDOMEN SURGERY  11/11/2019    Had Liver Transplant     BIOPSY  12/2022    Had biopsy done will have additional procedure 2/15/2023     BYPASS GRAFT ARTERY CORONARY N/A 07/14/2021    Procedure: median sternotomy, on cardiopulmonary bypass, CORONARY " ARTERY BYPASS GRAFT (CABG) x2 with left greater saphenous vein endoscopic harvest and left internal mammery artery harvest;  Surgeon: Tom Zapata MD;  [facility]     CARDIAC SURGERY  7/2021    Heart Graph. and bypass surgery     CHOLECYSTECTOMY  11/11/2022    Removed with Liver Transplant     COLONOSCOPY      2015     COLONOSCOPY N/A 12/06/2019    Procedure: COLONOSCOPY, WITH POLYPECTOMY AND BIOPSY;  Surgeon: Adam Morton MD;  [facility]     CV CENTRAL VENOUS CATHETER PLACEMENT N/A 07/12/2021    Procedure: Central Venous Catheter Placement;  Surgeon: Fermin Polanco MD;  Location:  HEART CARDIAC CATH LAB     CV CORONARY ANGIOGRAM N/A 07/12/2021    Procedure: Coronary Angiogram;  Surgeon: Fermin Polanco MD;  Location:  HEART CARDIAC CATH LAB     CV HEART CATHETERIZATION WITH POSSIBLE INTERVENTION N/A 02/26/2019    Procedure: CORS;  Surgeon: Jagdish Hoyt MD;  Location:  HEART CARDIAC CATH LAB     CV INTRA AORTIC BALLOON N/A 07/12/2021    Procedure: Intra Aortic Balloon Pump Insertion;  Surgeon: Fermin Polanco MD;  Location:  HEART CARDIAC CATH LAB     ESOPHAGOSCOPY, GASTROSCOPY, DUODENOSCOPY (EGD), COMBINED N/A 11/17/2016    Procedure: COMBINED ESOPHAGOSCOPY, GASTROSCOPY, DUODENOSCOPY (EGD);  Surgeon: Santi Rosas MD;  [facility]     ESOPHAGOSCOPY, GASTROSCOPY, DUODENOSCOPY (EGD), COMBINED N/A 11/17/2017    Procedure: COMBINED ESOPHAGOSCOPY, GASTROSCOPY, DUODENOSCOPY (EGD);  EGD;  Surgeon: Santi Rosas MD;  [facility]: [facility]     ESOPHAGOSCOPY, GASTROSCOPY, DUODENOSCOPY (EGD), COMBINED N/A 12/28/2018    Procedure: EGD;  Surgeon: Santi Rosas MD;  Location:  OR     ESOPHAGOSCOPY, GASTROSCOPY, DUODENOSCOPY (EGD), COMBINED N/A 12/06/2019    Procedure: ESOPHAGOGASTRODUODENOSCOPY, WITH BIOPSY;  Surgeon: Adam Morton MD;  [facility]     ESOPHAGOSCOPY, GASTROSCOPY, DUODENOSCOPY (EGD),  COMBINED N/A 2020    Procedure: ESOPHAGOGASTRODUODENOSCOPY (EGD);  Surgeon: aSnti Rosas MD;  Location:  GI     EXCISE MASS ABDOMEN N/A 2023    Procedure: Laparoscopic lysis of adhesions, laparoscopic resection of abdominal mass;  Surgeon: Ruiz Chu MD;  [facility]: [facility] OR     HEAD & NECK SURGERY      2017 at [facility].      IMPLANT GOLD WEIGHT EYELID Right 2017    Procedure: IMPLANT WEIGHT EYELID;  Right Upper Eyelid Weight, right tarsal strip lower eyelid;  Surgeon: Milana Malave MD;  [facility]     IR CHEMO EMBOLIZATION  2019     KNEE SURGERY Left      ORTHOPEDIC SURGERY       PAROTIDECTOMY, RADICAL NECK DISSECTION Right 2017    Procedure: PAROTIDECTOMY, RADICAL NECK DISSECTION;  Right Superfacial Parotidectomy , Facial nerve repair. with [facility] facial nerve monitor.;  Surgeon: Asiya Morgan MD;  [facility]     PHACOEMULSIFICATION CLEAR CORNEA WITH STANDARD INTRAOCULAR LENS IMPLANT Right 2023    Procedure: PHACOEMULSIFICATION, COMPLEX CATARACT, WITH INTRAOCULAR LENS IMPLANT WITH TRYPAN RIGHT EYE;  Surgeon: Enriqueta Martin MD;  [place]: [facility]     PHACOEMULSIFICATION WITH STANDARD INTRAOCULAR LENS IMPLANT Left 2023    Procedure: PHACOEMULSIFICATION, COMPLEX CATARACT, WITH STANDARD INTRAOCULAR LENS IMPLANT INSERTION LEFT EYE;  Surgeon: Enriqueta Martin MD;  [facility]: [facility]     PICC INSERTION Left 2017    4fr SL BioFlo PICC, 44cm in the L basilic vein w/ tip in the low SVC     RETURN LIVER TRANSPLANT N/A 2019    Procedure: Exploratory laparotomy, hematoma evacuation, abdominal washout;  Surgeon: Александр Ramos MD;  [facility]     TRANSPLANT LIVER RECIPIENT  DONOR N/A 2019    Procedure: TRANSPLANT, LIVER, RECIPIENT,  DONOR;  Surgeon: Александр Ramos MD;  [facility]     VASCULAR SURGERY         FAMILY HISTORY:  Family History   Problem Relation Age of Onset     Skin Cancer  Mother      Cancer Mother         mastectomy     Diabetes Mother          3/2016     Cerebrovascular Disease Mother         Passed away in Feb of this year, 80 years old.     Thyroid Disease Mother      Depression Mother      Breast Cancer Mother      Obesity Mother         280 pounds  from this and complications from D     Pancreatic Cancer Father 60     Prostate Cancer Father         Currently in Remission     Macular Degeneration Father      Glaucoma Father      Skin Cancer Father      Coronary Artery Disease Father         Started in his 70's  at 88 in [place]      Colon Cancer Father      Thyroid Disease Sister      Depression Sister      Asthma Sister      Sleep Apnea Brother      Colorectal Cancer Maternal Grandmother      Cancer Maternal Grandmother      Substance Abuse Maternal Grandmother         Alcohol     Prostate Cancer Maternal Grandfather      Substance Abuse Maternal Grandfather         Alcohol     Colorectal Cancer Paternal Grandmother      Asthma Sister         Had since birth     Liver Disease No family hx of      Melanoma No family hx of      Anesthesia Reaction No family hx of      Deep Vein Thrombosis (DVT) No family hx of        SOCIAL HISTORY:  Social History     Socioeconomic History     Marital status:      Highest education level: Bachelor's degree (e.g., BA, AB, BS)   Tobacco Use     Smoking status: Former     Types: Dip, chew, snus or snuff     Passive exposure: Past     Smokeless tobacco: Former     Types: Chew     Quit date: 10/31/2017     Tobacco comments:     Quit [facility] after Doctor told me in 2017 that if I didn't I would   Vaping Use     Vaping status: Never Used   Substance and Sexual Activity     Alcohol use: No     Comment: quit 1996     Drug use: No     Sexual activity: Not Currently     Partners: Female     Birth control/protection: Condom   Other Topics Concern     Parent/sibling w/ CABG, MI or angioplasty before 65F 55M? No   Social  History Narrative    Prior , [facility]     Social Drivers of Health     Financial Resource Strain: Low Risk  (1/23/2024)    Financial Resource Strain      Within the past 12 months, have you or your family members you live with been unable to get utilities (heat, electricity) when it was really needed?: No   Food Insecurity: Low Risk  (1/23/2024)    Food Insecurity      Within the past 12 months, did you worry that your food would run out before you got money to buy more?: No      Within the past 12 months, did the food you bought just not last and you didn t have money to get more?: No   Transportation Needs: Low Risk  (1/23/2024)    Transportation Needs      Within the past 12 months, has lack of transportation kept you from medical appointments, getting your medicines, non-medical meetings or appointments, work, or from getting things that you need?: No   Physical Activity: Insufficiently Active (10/13/2020)    Exercise Vital Sign      Days of Exercise per Week: 1 day      Minutes of Exercise per Session: 60 min   Stress: Stress Concern Present (10/13/2020)    [employer] - Occupational Stress Questionnaire      Feeling of Stress : To some extent   Social Connections: Socially Isolated (10/13/2020)    Social Connection and Isolation Panel [NHANES]      Frequency of Communication with Friends and Family: Once a week      Frequency of Social Gatherings with Friends and Family: Once a week      Attends Sikhism Services: Never      Active Member of Clubs or Organizations: No      Attends Club or Organization Meetings: Never      Marital Status:    Interpersonal Safety: Low Risk  (4/30/2024)    Interpersonal Safety      Do you feel physically and emotionally safe where you currently live?: Yes      Within the past 12 months, have you been hit, slapped, kicked or otherwise physically hurt by someone?: No      Within the past 12 months, have you been humiliated or  emotionally abused in other ways by your partner or ex-partner?: No   Housing Stability: Low Risk  (1/23/2024)    Housing Stability      Do you have housing? : Yes      Are you worried about losing your housing?: No       MEDICATIONS:  Current Outpatient Medications   Medication Sig Dispense Refill     acetaminophen (TYLENOL) 325 MG tablet TAKE 1 TABLET BY MOUTH EVERY SIX HOURS AS NEEDED FOR MILD PAIN 100 tablet 3     albuterol (PROAIR HFA/PROVENTIL HFA/VENTOLIN HFA) 108 (90 Base) MCG/ACT inhaler INHALE 2 PUFFS INTO THE LUNGS EVERY 4 HOURS AS NEEDED FOR SHORTNESS OF BREATH, WHEEZING OR COUGH 18 g 0     ammonium lactate (AMLACTIN) 12 % external cream APPLY TOPICALLY DAILY AS NEEDED FOR DRY SKIN (ON FEET) 140 g 5     benzoyl peroxide 5 % external liquid Use topically in showers as a body wash 226 g 9     blood glucose (NO BRAND SPECIFIED) lancets standard Use to test blood sugar 1 times daily or as directed. 100 each 11     Continuous Glucose Sensor (FREESTYLE CLAUDIA 2 SENSOR) MISC 1 each See Admin Instructions Change every 14 days. 7 each 3     Continuous Glucose Sensor (FREESTYLE CLAUDIA 3 PLUS SENSOR) MISC Change every 15 days. 6 each 1     diphenoxylate-atropine (LOMOTIL) 2.5-0.025 MG tablet Take 1 tablet by mouth 4 times daily as needed for diarrhea. 60 tablet 0     ELIQUIS ANTICOAGULANT 5 MG tablet TAKE 1 TABLET BY MOUTH TWICE A DAY 60 tablet 3     empagliflozin (JARDIANCE) 10 MG TABS tablet Take 1 tablet (10 mg) by mouth daily 30 tablet 11     insulin aspart (NOVOLOG FLEXPEN) 100 UNIT/ML pen INJECT 5-10U SUBCUTANEOUSLY FOUR TIMES A DAY ( WITH MEALS AND EVERY NIGHT ) ONE UNIT :8CARB BEFORE MEALS . ALSO ADD ONE UNIT : 50MG/DL 15 mL 11     insulin degludec (TRESIBA FLEXTOUCH) 100 UNIT/ML pen INJECT 14 UNITS SUBCUTANEOUSLY ONCE DAILY 15 mL 3     insulin pen needle (ULTICARE MICRO) 32G X 4 MM miscellaneous USE 5 PER  each 3     ketoconazole (NIZORAL) 2 % external shampoo Apply thin layer topically to scalp in  shower (leave on 5 min prior to rinse); may also use as a body wash 120 mL 11     lamiVUDine (EPIVIR) 100 MG tablet TAKE 1 TABLET BY MOUTH ONCE DAILY 90 tablet 2     lisinopril (ZESTRIL) 10 MG tablet Take 1 tablet (10 mg) by mouth daily. 30 tablet 11     loperamide (IMODIUM) 2 MG capsule TAKE ONE TO TWO CAPSULES BY MOUTH FOUR TIMES A DAY AS NEEDED FOR DIARRHEA (DO NOT TAKE MORE THAN 8 CAPSULES PER DAY). 120 capsule 1     MAGNESIUM OXIDE 400 (240 Mg) MG tablet TAKE 1 TABLET BY MOUTH DAILY 30 tablet 5     metoprolol succinate ER (TOPROL XL) 25 MG 24 hr tablet TAKE 1 TABLET BY MOUTH DAILY 30 tablet 3     Multiple Vitamin (DAILY-GLADIS MULTIVITAMIN) TABS TAKE 1 TABLET BY MOUTH ONCE DAILY 30 tablet 11     mycophenolate (GENERIC EQUIVALENT) 250 MG capsule TAKE TWO CAPSULES BY MOUTH EVERY 12 HOURS 120 capsule 11     NIFEdipine ER OSMOTIC (PROCARDIA XL) 60 MG 24 hr tablet TAKE 1 TABLET(60 MG) BY MOUTH TWICE DAILY 6 tablet 0     NOVOLOG FLEXPEN 100 UNIT/ML soln Use as directed up to 50 units daily. 45 mL 3     omega 3 1000 MG CAPS Take 2,000 mg by mouth 2 times daily. 120 capsule 11     ondansetron (ZOFRAN ODT) 8 MG ODT tab Take 1 tablet (8 mg) by mouth every 8 hours as needed for nausea 15 tablet 3     pantoprazole (PROTONIX) 40 MG EC tablet TAKE 1 TABLET BY MOUTH ONCE DAILY BEFORE BREAKFAST 90 tablet 3     PHOSPHORUS TABLET 250  MG per tablet TAKE 1 TABLET BY MOUTH 4 TIMES DAILY 120 tablet 1     prednisoLONE acetate (PRED FORTE) 1 % ophthalmic suspension        predniSONE (DELTASONE) 5 MG tablet TAKE ONE TABLET BY MOUTH ONCE DAILY 30 tablet 11     rosuvastatin (CRESTOR) 20 MG tablet Take 1 tablet (20 mg) by mouth daily. 30 tablet 11     sodium zirconium cyclosilicate (LOKELMA) 10 g PACK packet Take 10 gram 3x/day for 2 days then 10 grams daily. (Patient taking differently: Take 10 g by mouth daily. Take 10 gram 3x/day for 2 days then 10 grams daily.) 30 packet 11     SORAfenib (NEXAVAR) 200 MG tablet Take 1 tablet (200  mg) by mouth 2 times daily. On an empty stomach 1 hour before or 2 hours after a meal. 60 tablet 0     tamsulosin (FLOMAX) 0.4 MG capsule TAKE 1 CAPSULE BY MOUTH ONCE DAILY 30 capsule 11     UltiCare Alcohol Swabs 70 % PADS 1 each by Other route 4 times daily 400 each 1     vitamin D3 (CHOLECALCIFEROL) 50 mcg (2000 units) tablet TAKE 1 TABLET BY MOUTH ONCE DAILY 30 tablet 8     Current Facility-Administered Medications   Medication Dose Route Frequency Provider Last Rate Last Admin     aflibercept (EYLEA) injection prefilled syringe 2 mg  2 mg Intravitreal Q28 Days Enriqueta Martin MD   2 mg at 05/31/23 1144     dexamethasone (OZURDEX) intravitreal implant 0.7 mg  0.7 mg Intravitreal Q2 Months Enriqueta Martin MD         dexamethasone (OZURDEX) intravitreal implant 0.7 mg  0.7 mg Intravitreal Q2 Months Enriqueta Martin MD faricimab-svoa (VABYSMO) intravitreal injection 6 mg  6 mg Intravitreal q28 days Enriqueta Martin MD faricimab-svoa (VABYSMO) intravitreal injection 6 mg  6 mg Intravitreal q28 days Enriqueta Martin MD faricimab-svoa (VABYSMO) intravitreal injection 6 mg  6 mg Intravitreal q28 days Enriqueta Martin MD   6 mg at 03/06/24 1001     lidocaine (PF) (XYLOCAINE) injection 1 mL  1 mL Ophthalmic Q2 Months Enriqueta Martin MD            ALLERGIES:     Allergies   Allergen Reactions     Codeine Other (See Comments)     Cannot take due to liver  Cannot tolerate oral narcotics     Seasonal Allergies      Sneezing, coughing, runny and itchy eyes       ROS:  A complete 10-point ROS was negative except as above.    PHYSICAL EXAM:  BP (!) 147/75 (BP Location: Left arm, Patient Position: Chair, Cuff Size: Adult Regular)   Pulse 85   SpO2 99%   Gen: alert, interactive, NAD  Neck: supple, no JVD  CV: RRR, no m/r/g  Chest: CTAB, no wheezes or crackles  Abd: nontender, mildly distended  Ext: trace alba. ankle edema    LABS:    [facility]  Last  Comprehensive Metabolic Panel:  Sodium   Date Value Ref Range Status   10/23/2024 139 135 - 145 mmol/L Final   06/28/2021 137 133 - 144 mmol/L Final     Potassium   Date Value Ref Range Status   10/23/2024 4.5 3.4 - 5.3 mmol/L Final   07/20/2022 4.7 3.4 - 5.3 mmol/L Final   06/28/2021 4.6 3.4 - 5.3 mmol/L Final     Potassium POCT   Date Value Ref Range Status   08/10/2023 7.7 (HH) 3.4 - 5.3 mmol/L Final     Comment:     Chart Critical result. Doctor notified     Chloride   Date Value Ref Range Status   10/23/2024 103 98 - 107 mmol/L Final   07/20/2022 105 94 - 109 mmol/L Final   06/28/2021 107 94 - 109 mmol/L Final     Carbon Dioxide   Date Value Ref Range Status   06/28/2021 25 20 - 32 mmol/L Final     Carbon Dioxide (CO2)   Date Value Ref Range Status   10/23/2024 23 22 - 29 mmol/L Final   07/20/2022 26 20 - 32 mmol/L Final     Anion Gap   Date Value Ref Range Status   10/23/2024 13 7 - 15 mmol/L Final   07/20/2022 7 3 - 14 mmol/L Final   06/28/2021 5 3 - 14 mmol/L Final     Glucose   Date Value Ref Range Status   10/23/2024 130 (H) 70 - 99 mg/dL Final   07/20/2022 269 (H) 70 - 99 mg/dL Final   06/28/2021 208 (H) 70 - 99 mg/dL Final     GLUCOSE BY METER POCT   Date Value Ref Range Status   08/16/2023 185 (H) 70 - 99 mg/dL Final     Urea Nitrogen   Date Value Ref Range Status   10/23/2024 24.7 (H) 8.0 - 23.0 mg/dL Final   07/20/2022 32 (H) 7 - 30 mg/dL Final   06/28/2021 33 (H) 7 - 30 mg/dL Final     Creatinine   Date Value Ref Range Status   10/23/2024 1.52 (H) 0.67 - 1.17 mg/dL Final   06/28/2021 1.62 (H) 0.66 - 1.25 mg/dL Final     GFR Estimate   Date Value Ref Range Status   10/23/2024 52 (L) >60 mL/min/1.73m2 Final     Comment:     eGFR calculated using 2021 CKD-EPI equation.   06/28/2021 46 (L) >60 mL/min/[1.73_m2] Final     Comment:     Non  GFR Calc  Starting 12/18/2018, serum creatinine based estimated GFR (eGFR) will be   calculated using the Chronic Kidney Disease Epidemiology  Collaboration   (CKD-EPI) equation.       GFR, ESTIMATED POCT   Date Value Ref Range Status   09/05/2024 58 (L) >60 mL/min/1.73m2 Final     Calcium   Date Value Ref Range Status   10/23/2024 7.5 (L) 8.8 - 10.4 mg/dL Final     Comment:     Reference intervals for this test were updated on 7/16/2024 to reflect our healthy population more accurately. There may be differences in the flagging of prior results with similar values performed with this method. Those prior results can be interpreted in the context of the updated reference intervals.   06/28/2021 9.1 8.5 - 10.1 mg/dL Final     Bilirubin Total   Date Value Ref Range Status   10/23/2024 0.7 <=1.2 mg/dL Final   06/28/2021 0.5 0.2 - 1.3 mg/dL Final     Alkaline Phosphatase   Date Value Ref Range Status   10/23/2024 103 40 - 150 U/L Final   06/28/2021 98 40 - 150 U/L Final     ALT   Date Value Ref Range Status   10/23/2024 24 0 - 70 U/L Final   06/28/2021 20 0 - 70 U/L Final     AST   Date Value Ref Range Status   10/23/2024 27 0 - 45 U/L Final   06/28/2021 9 0 - 45 U/L Final       TROP  Lab Results   Component Value Date    TROPI <0.015 07/01/2020    TROPI <0.015 01/24/2019    TROPONIN 1.474 () 07/15/2021    TROPONIN 0.167 () 07/14/2021    TROPONIN 0.180 () 07/13/2021    TROPONIN 0.191 () 07/13/2021    TROPONIN 0.200 () 07/13/2021       CBC  CBC RESULTS:   Recent Labs   Lab Test 10/23/24  1044   WBC 6.2   RBC 3.80*   HGB 11.3*   HCT 36.1*   MCV 95   MCH 29.7   MCHC 31.3*   RDW 16.1*          LIPIDS  Recent Labs   Lab Test 08/02/24  0843 06/04/24  1040   CHOL 139 180   HDL 37* 43   LDL 53 94   TRIG 245* 217*       TSH  TSH   Date Value Ref Range Status   09/05/2024 1.32 0.30 - 4.20 uIU/mL Final   04/25/2022 1.24 0.40 - 4.00 mU/L Final   01/28/2021 0.91 0.40 - 4.00 mU/L Final       HBA1C  Lab Results   Component Value Date    A1C 5.6 09/05/2024    A1C 5.6 05/13/2024    A1C 5.7 12/18/2023    A1C 6.3 06/14/2023    A1C 6.9 12/14/2022    A1C 6.0  01/10/2022    A1C 6.0 12/30/2020    A1C 6.3 06/13/2020    A1C 6.6 08/08/2018    A1C 6.5 06/09/2017    A1C 7.8 10/25/2016         CARDIAC DATA:   Echo 3/15/22:  Left ventricular function is normal.The ejection fraction is > 65%. Abnormal  non-specific septal motion is present.  Global right ventricular function is normal. The RV is not clearly visualized  but the size and function are probably normal. The RV FAC is 40%.  There are no significant valvular abnormalities.  The estimated PA systolic pressure is 29 mmHg.  IVC diameter <2.1 cm collapsing >50% with sniff suggests a normal RA pressure  of 3 mmHg.  This study was compared with the study from 01/11/2022. There is no  significant change in the biventricular size/function upon direct visual  comparison.    Cardiac Catheterization 7/12/21:  Severe distal Left main disease at trifurcation of LCx, LAD, and Ramus.  IABP inserted in the RFA.  CVC inserted in the RIJ.    Urgent cardiovascular surgery consultation for consideration for bypass surgery.         ASSESSMENT/PLAN:  In summary, Frandy Workman is here today with the following -      # CAD w/ NSTEMI s/p CABG (LIMA to LAD and SVG to OM2) in 2021  # Dyslipidemia  # HTN, goal <130/80  # BLE Edema  # Abdominal Swelling    No angina post-CABG. Blood pressures not well controlled. He has trace ankle edema and a mildly distended abdomen on exam today. Prior echo in 2022 with EF >65%. Discussed with patient and he would like to proceed with updating ultrasound to rule out cardiac etiology for recent edema.  Most recent lipid panel 8/2/24 with Total Chol 139,  and LDL 53. Significantly improved from 2023 values with increased statin dosing and addition of omega-3.    - Continue current medications:  Toprol 25 mg daily  Procardia 60 mg daily  Omega-3 2g BID  Rosuvastatin 20 mg daily  - Increase Lisinopril 10 mg daily  - Schedule echocardiogram  - Check BP regularly at home  - Continue lifestyle modifications for  risk factor management: maintain healthy weight, aerobic and strength exercise with goal of 150 minutes per week, healthy diet with emphasis in fruits, vegetables and fiber, and avoid processed and prepackaged foods     # T2DM: On Jardiance, followed by endocrinology  # DVT of left peroneal vein: On eliquis, followed by oncology      Follow up:  - in 1 year with lipids prior or sooner if indicated by testing      SHANIQUE Landon, FNP-C  [facility]  Securely message with Social Media Simplified   Text page via [facility] Paging/Directory          Chart review time: 10 minutes  Visit time: 31 minutes  Chart completion/documentation: 8 minutes  Total time spent: 49 minutes       Please do not hesitate to contact me if you have any questions/concerns.     Sincerely,     SHANIQUE Landon CNP

## 2024-11-22 NOTE — NURSING NOTE
Frandy Workman is a 58 year old male that presents in clinic today for the following:     Chief Complaint   Patient presents with     Follow Up     Pt here for a return appointment with provider.      Fall     Pt fell last Sunday 05/14/2023 and still is having pain on R side        The patient's allergies and medications were reviewed. The patient's vitals were obtained without incident. The patient does not have any other questions for the provider.     Ruth Chaudhry, EMT at 2:50 PM on 5/17/2023.  Primary Care Clinic: [phone]  
45

## 2024-11-25 ENCOUNTER — TELEPHONE (OUTPATIENT)
Dept: NEPHROLOGY | Facility: CLINIC | Age: 59
End: 2024-11-25

## 2024-11-25 ENCOUNTER — ANCILLARY PROCEDURE (OUTPATIENT)
Dept: CARDIOLOGY | Facility: CLINIC | Age: 59
End: 2024-11-25
Payer: MEDICARE

## 2024-11-25 DIAGNOSIS — R19.00 ABDOMINAL SWELLING: ICD-10-CM

## 2024-11-25 DIAGNOSIS — R60.0 BILATERAL LOWER EXTREMITY EDEMA: ICD-10-CM

## 2024-11-25 LAB — LVEF ECHO: NORMAL

## 2024-11-25 PROCEDURE — 93306 TTE W/DOPPLER COMPLETE: CPT | Performed by: INTERNAL MEDICINE

## 2024-11-25 NOTE — TELEPHONE ENCOUNTER
Left Voicemail (1st Attempt) and Sent [email or web address] (1st Attempt) for the patient to call back and schedule the following:    Appointment type: Return Nephrology  Provider: Any Nephrologist  Return date: Next avail  Specialty phone number: [phone]  Additional appointment(s) needed: Labs prior  Additonal Notes: overdue follow up, prev Dr. Rao pt

## 2024-11-26 ENCOUNTER — TELEPHONE (OUTPATIENT)
Dept: ENDOCRINOLOGY | Facility: CLINIC | Age: 59
End: 2024-11-26
Payer: MEDICARE

## 2024-11-26 NOTE — TELEPHONE ENCOUNTER
Prior Authorization Approval    Authorization Effective Date: 11/12/2024  Authorization Expiration Date: 12/31/2099  Medication: Novolog flex pen-APPROVED  Reference #:     Insurance Company: [employer] - Phone [phone] Fax [phone]  Which Pharmacy is filling the prescription (Not needed for infusion/clinic administered): [facility] - [place] [address] [address]  Pharmacy Notified: Yes  Patient Notified: Instructed pharmacy to notify patient when script is ready to /ship.

## 2024-11-26 NOTE — TELEPHONE ENCOUNTER
Appeal letter done to try to keep the Novolog flexpen. He didn't like fiasp and the pen is on back order. Brisa Woods RN on 11/26/2024 at 12:34 PM

## 2024-11-26 NOTE — TELEPHONE ENCOUNTER
Prior Authorization Retail Medication Request    Medication/Dose: Novolog Flex pen  Diagnosis and ICD code (if different than what is on RX):  Type 2 diabetes  New/renewal/insurance change PA/secondary ins. PA:renewal  Previously Tried and Failed:  Insulin lispro, humalog, fiasp   Rationale:  FIasp doesn't come in pen ( back order) and he cannot do syringe/ vial method   His A1C is 5.6%  fighting metastatic disease  and other options of NPH  NovolinR are not appropriate     Insurance   Primary: Medicare  Insurance ID:  [id number]    SPharmacy Information (if different than what is on RX)  Name:  [facility]   Phone:  [phone]  Fax:[phone]      Clinic Information  Preferred routing pool for dept communication: patient call   Urgent out Brisa Woods RN on 11/26/2024 at 12:18 PM

## 2024-11-26 NOTE — TELEPHONE ENCOUNTER
Hello,     This is [facility] requesting a few things for this patient's Medicare order of the Freestyle Benjamin 3     The first is requesting an addendum to the clinic notes from 11/18/24 to include that the patient is switching to Freestyle Benjamin 3 or to state that patient is using CGM per Medicare's guidelines below:     VISIT NOTE REQUIREMENTS: (must state the following)  Patient must be seen/clinical notes must be written in the last 6 months by the prescribing provider.  Must state that the patient is injecting insulin 1 time daily or more (Can be written that patient is injecting with insulin pump) We cannot accept medication list as insulin regimen.  Must state that the patient is a type 1 or type 2 diabetic.  Must state that patient is using CGM        As a reminder, Medicare requires patients to be seen every 3 months for insulin supplies and every 6 months for CGMs. The provider who sees the patient must be the provider on the prescription, must be written on the day of or after office visit date and office visit must include notes about diabetes and insulin regimen. The Diabetes Care Services team at [facility] may request new prescriptions before renewal dates of the prescription is filled over the allowable amount. Writing the order to match what is above will help ensure we will only need to request every 3 or 6 months.    If you have any questions regarding these requests please call us at [phone] or send a message to the [employer] pool     Thank you!     [facility]  Diabetes Care Services Team  [address] [place] [address]  Provider Phone: [phone]  Patient Line: [phone]  Fax: [phone]  E-mail: [email or web address]

## 2024-11-26 NOTE — TELEPHONE ENCOUNTER
Retail Pharmacy Prior Authorization Team   Phone: [phone]    PA Initiation    Medication: NOVOLOG FLEXPEN 100 UNIT/ML SC SOPN  Insurance Company: [employer] - Phone [phone] Fax [phone]  Pharmacy Filling the Rx: [facility] - [place], [place] - [facility] [address]  Filling Pharmacy Phone: [phone]  Filling Pharmacy Fax:    Start Date: 11/26/2024

## 2024-11-26 NOTE — TELEPHONE ENCOUNTER
----- Message from Frannie Vasquez sent at 11/26/2024 11:08 AM CST -----  Regarding: RE: appeal letter  I would advise him to remain on fiasp if it is covered. He just needs to know to take it right when he is eating, rather than 15 mins before. Can you please reach out to him?   Thanks!  Frannie  ----- Message -----  From: Brisa Woods RN  Sent: 11/21/2024   1:35 PM CST  To: Frannie Vasquez PA-C  Subject: appeal letter                                    Miller  cannot get the NOvolog flexpen unless he's tried 2 alternatives. He used the Fiasp  but not sure why besides he didn't like it  for failed use. They mention Novollin 70-30- Novololin R - Novolog mix and Novolog 70/30 . Need a letter on why the others are not appropriate alternatives.  Brisa BYRNE

## 2024-11-27 ENCOUNTER — TELEPHONE (OUTPATIENT)
Dept: ENDOCRINOLOGY | Facility: CLINIC | Age: 59
End: 2024-11-27
Payer: MEDICARE

## 2024-11-27 DIAGNOSIS — E11.3293 TYPE 2 DIABETES MELLITUS WITH MILD NONPROLIFERATIVE RETINOPATHY OF BOTH EYES WITHOUT MACULAR EDEMA, UNSPECIFIED WHETHER LONG TERM INSULIN USE (H): Primary | ICD-10-CM

## 2024-11-27 NOTE — TELEPHONE ENCOUNTER
Hello,    This message is to inform you that we are in need of a Medicare compliant prescription for Freestyle Benjamin 3 reader. We are also requesting an addendum to the 11/18 visit notes to state that the patient is now using the Freestyle Benjamin 3. The patient is out of sensors and would like to order today asap.    Prescription must be written by: Kam Vasquez.  Must include full supply name: Freestyle Benjamin reader  Quantity: 1  Refills: 0  SIG: use to read blood sugar per  instructions         Thank you!    [facility]  Diabetes Care Services Team  [address] [place] [address]  Provider Phone: [phone]  Patient Line: [phone]  Fax: [phone]

## 2024-12-01 DIAGNOSIS — C22.0 HCC (HEPATOCELLULAR CARCINOMA) (H): Primary | ICD-10-CM

## 2024-12-01 DIAGNOSIS — C78.6 MALIGNANT NEOPLASM METASTATIC TO PERITONEUM (H): ICD-10-CM

## 2024-12-02 RX ORDER — KETOROLAC TROMETHAMINE 30 MG/ML
1 INJECTION, SOLUTION INTRAMUSCULAR; INTRAVENOUS 4 TIMES DAILY
Status: SHIPPED | DISCHARGE
Start: 2024-12-02 | End: 2024-12-05

## 2024-12-02 NOTE — ADDENDUM NOTE
Addended by: BRIDGETT QUIÑONES on: 12/2/2024 05:35 PM     Modules accepted: Orders     - c/w home methylprednisolone, pantoprazole

## 2024-12-02 NOTE — TELEPHONE ENCOUNTER
Hello,     Following up on request below, patient has been out of supply and called pharmacy for an update.    Thank you,    Diabetes Care Services Pharmacy Team  [facility]  Phone: [phone]  Fax: [phone]  Pool: Pharm Diabetes

## 2024-12-03 ENCOUNTER — TELEPHONE (OUTPATIENT)
Dept: ENDOCRINOLOGY | Facility: CLINIC | Age: 59
End: 2024-12-03
Payer: MEDICARE

## 2024-12-03 DIAGNOSIS — Z94.4 LIVER TRANSPLANT RECIPIENT (H): ICD-10-CM

## 2024-12-03 DIAGNOSIS — E11.3293 TYPE 2 DIABETES MELLITUS WITH MILD NONPROLIFERATIVE RETINOPATHY OF BOTH EYES WITHOUT MACULAR EDEMA, UNSPECIFIED WHETHER LONG TERM INSULIN USE (H): Primary | ICD-10-CM

## 2024-12-03 NOTE — TELEPHONE ENCOUNTER
Hello,    This message is to inform you that we are in need of a Medicare compliant prescription for the Freestyle benjamin 3 reader.     Prescription must be written by: LILLIAN Vasquez.  Must include full supply name: Freestyle Benjamin 3 reader  Quantity: 1  Refills: 0  SIG: use to read blood sugar per 's instructions        Thank you!    [facility]  Diabetes Care Services Team  [address] [place] [address]  Provider Phone: [phone]  Patient Line: [phone]  Fax: [phone]

## 2024-12-04 ENCOUNTER — LAB (OUTPATIENT)
Dept: LAB | Facility: CLINIC | Age: 59
End: 2024-12-04
Payer: MEDICARE

## 2024-12-04 ENCOUNTER — TELEPHONE (OUTPATIENT)
Dept: ONCOLOGY | Facility: CLINIC | Age: 59
End: 2024-12-04

## 2024-12-04 ENCOUNTER — ANCILLARY PROCEDURE (OUTPATIENT)
Dept: CT IMAGING | Facility: CLINIC | Age: 59
End: 2024-12-04
Attending: INTERNAL MEDICINE
Payer: MEDICARE

## 2024-12-04 DIAGNOSIS — N18.32 ANEMIA OF CHRONIC RENAL FAILURE, STAGE 3B (H): ICD-10-CM

## 2024-12-04 DIAGNOSIS — E11.3293 TYPE 2 DIABETES MELLITUS WITH MILD NONPROLIFERATIVE RETINOPATHY OF BOTH EYES WITHOUT MACULAR EDEMA, UNSPECIFIED WHETHER LONG TERM INSULIN USE (H): ICD-10-CM

## 2024-12-04 DIAGNOSIS — C78.6 MALIGNANT NEOPLASM METASTATIC TO PERITONEUM (H): ICD-10-CM

## 2024-12-04 DIAGNOSIS — N18.32 STAGE 3B CHRONIC KIDNEY DISEASE (H): ICD-10-CM

## 2024-12-04 DIAGNOSIS — Z94.4 LIVER TRANSPLANT RECIPIENT (H): ICD-10-CM

## 2024-12-04 DIAGNOSIS — I82.452 ACUTE DEEP VEIN THROMBOSIS (DVT) OF LEFT PERONEAL VEIN (H): ICD-10-CM

## 2024-12-04 DIAGNOSIS — N18.32 STAGE 3B CHRONIC KIDNEY DISEASE (CKD) (H): ICD-10-CM

## 2024-12-04 DIAGNOSIS — C22.0 HCC (HEPATOCELLULAR CARCINOMA) (H): ICD-10-CM

## 2024-12-04 DIAGNOSIS — D63.1 ANEMIA OF CHRONIC RENAL FAILURE, STAGE 3B (H): ICD-10-CM

## 2024-12-04 DIAGNOSIS — Z94.4 STATUS POST LIVER TRANSPLANTATION (H): ICD-10-CM

## 2024-12-04 DIAGNOSIS — E78.5 HYPERLIPIDEMIA LDL GOAL <100: ICD-10-CM

## 2024-12-04 DIAGNOSIS — C22.0 HCC (HEPATOCELLULAR CARCINOMA) (H): Primary | ICD-10-CM

## 2024-12-04 LAB
AFP SERPL-MCNC: 17.2 NG/ML
ALBUMIN MFR UR ELPH: 233 MG/DL
ALBUMIN SERPL BCG-MCNC: 4.3 G/DL (ref 3.5–5.2)
ALBUMIN UR-MCNC: 30 MG/DL
ALP SERPL-CCNC: 105 U/L (ref 40–150)
ALT SERPL W P-5'-P-CCNC: 32 U/L (ref 0–70)
ANION GAP SERPL CALCULATED.3IONS-SCNC: 13 MMOL/L (ref 7–15)
APPEARANCE UR: CLEAR
AST SERPL W P-5'-P-CCNC: 33 U/L (ref 0–45)
BASOPHILS # BLD AUTO: 0.1 10E3/UL (ref 0–0.2)
BASOPHILS NFR BLD AUTO: 1 %
BILIRUB SERPL-MCNC: 0.4 MG/DL
BILIRUB UR QL STRIP: NEGATIVE
BUN SERPL-MCNC: 34 MG/DL (ref 8–23)
CALCIUM SERPL-MCNC: 8.4 MG/DL (ref 8.8–10.4)
CHLORIDE SERPL-SCNC: 105 MMOL/L (ref 98–107)
CHOLEST SERPL-MCNC: 194 MG/DL
COLOR UR AUTO: ABNORMAL
CREAT BLD-MCNC: 1.7 MG/DL (ref 0.7–1.3)
CREAT SERPL-MCNC: 1.58 MG/DL (ref 0.67–1.17)
CREAT UR-MCNC: 51 MG/DL
CREAT UR-MCNC: 51.7 MG/DL
EGFRCR SERPLBLD CKD-EPI 2021: 46 ML/MIN/1.73M2
EGFRCR SERPLBLD CKD-EPI 2021: 50 ML/MIN/1.73M2
EOSINOPHIL # BLD AUTO: 0.2 10E3/UL (ref 0–0.7)
EOSINOPHIL NFR BLD AUTO: 3 %
ERYTHROCYTE [DISTWIDTH] IN BLOOD BY AUTOMATED COUNT: 16.1 % (ref 10–15)
FASTING STATUS PATIENT QL REPORTED: YES
FASTING STATUS PATIENT QL REPORTED: YES
GLUCOSE SERPL-MCNC: 80 MG/DL (ref 70–99)
GLUCOSE UR STRIP-MCNC: 1000 MG/DL
HCO3 SERPL-SCNC: 22 MMOL/L (ref 22–29)
HCT VFR BLD AUTO: 35 % (ref 40–53)
HDLC SERPL-MCNC: 43 MG/DL
HGB BLD-MCNC: 10.6 G/DL (ref 13.3–17.7)
HGB UR QL STRIP: ABNORMAL
IMM GRANULOCYTES # BLD: 0 10E3/UL
IMM GRANULOCYTES NFR BLD: 0 %
KETONES UR STRIP-MCNC: NEGATIVE MG/DL
LDLC SERPL CALC-MCNC: 79 MG/DL
LEUKOCYTE ESTERASE UR QL STRIP: NEGATIVE
LYMPHOCYTES # BLD AUTO: 1.1 10E3/UL (ref 0.8–5.3)
LYMPHOCYTES NFR BLD AUTO: 16 %
MAGNESIUM SERPL-MCNC: 1.6 MG/DL (ref 1.7–2.3)
MCH RBC QN AUTO: 29.5 PG (ref 26.5–33)
MCHC RBC AUTO-ENTMCNC: 30.3 G/DL (ref 31.5–36.5)
MCV RBC AUTO: 98 FL (ref 78–100)
MICROALBUMIN UR-MCNC: 1415 MG/L
MICROALBUMIN/CREAT UR: 2774.51 MG/G CR (ref 0–17)
MONOCYTES # BLD AUTO: 0.4 10E3/UL (ref 0–1.3)
MONOCYTES NFR BLD AUTO: 6 %
NEUTROPHILS # BLD AUTO: 5.2 10E3/UL (ref 1.6–8.3)
NEUTROPHILS NFR BLD AUTO: 74 %
NITRATE UR QL: NEGATIVE
NONHDLC SERPL-MCNC: 151 MG/DL
NRBC # BLD AUTO: 0 10E3/UL
NRBC BLD AUTO-RTO: 0 /100
PH UR STRIP: 6 [PH] (ref 5–7)
PHOSPHATE SERPL-MCNC: 3.2 MG/DL (ref 2.5–4.5)
PLATELET # BLD AUTO: 181 10E3/UL (ref 150–450)
POTASSIUM SERPL-SCNC: 4.3 MMOL/L (ref 3.4–5.3)
PROT SERPL-MCNC: 7.3 G/DL (ref 6.4–8.3)
PROT/CREAT 24H UR: 4.51 MG/MG CR (ref 0–0.2)
RBC # BLD AUTO: 3.59 10E6/UL (ref 4.4–5.9)
RBC URINE: 1 /HPF
SODIUM SERPL-SCNC: 140 MMOL/L (ref 135–145)
SP GR UR STRIP: 1.01 (ref 1–1.03)
SQUAMOUS EPITHELIAL: <1 /HPF
TRIGL SERPL-MCNC: 359 MG/DL
UROBILINOGEN UR STRIP-MCNC: NORMAL MG/DL
VIT D+METAB SERPL-MCNC: 23 NG/ML (ref 20–50)
WBC # BLD AUTO: 6.9 10E3/UL (ref 4–11)
WBC URINE: 2 /HPF

## 2024-12-04 PROCEDURE — 82043 UR ALBUMIN QUANTITATIVE: CPT

## 2024-12-04 PROCEDURE — 82105 ALPHA-FETOPROTEIN SERUM: CPT | Performed by: INTERNAL MEDICINE

## 2024-12-04 PROCEDURE — 83036 HEMOGLOBIN GLYCOSYLATED A1C: CPT | Performed by: FAMILY MEDICINE

## 2024-12-04 PROCEDURE — 99000 SPECIMEN HANDLING OFFICE-LAB: CPT | Performed by: PATHOLOGY

## 2024-12-04 PROCEDURE — 84156 ASSAY OF PROTEIN URINE: CPT | Performed by: PATHOLOGY

## 2024-12-04 PROCEDURE — 84100 ASSAY OF PHOSPHORUS: CPT | Performed by: PATHOLOGY

## 2024-12-04 PROCEDURE — G1010 CDSM STANSON: HCPCS | Performed by: STUDENT IN AN ORGANIZED HEALTH CARE EDUCATION/TRAINING PROGRAM

## 2024-12-04 PROCEDURE — 80053 COMPREHEN METABOLIC PANEL: CPT | Performed by: PATHOLOGY

## 2024-12-04 PROCEDURE — 36415 COLL VENOUS BLD VENIPUNCTURE: CPT | Performed by: PATHOLOGY

## 2024-12-04 PROCEDURE — 74178 CT ABD&PLV WO CNTR FLWD CNTR: CPT | Mod: MG | Performed by: STUDENT IN AN ORGANIZED HEALTH CARE EDUCATION/TRAINING PROGRAM

## 2024-12-04 PROCEDURE — 85025 COMPLETE CBC W/AUTO DIFF WBC: CPT | Performed by: PATHOLOGY

## 2024-12-04 PROCEDURE — 71260 CT THORAX DX C+: CPT | Mod: MG | Performed by: STUDENT IN AN ORGANIZED HEALTH CARE EDUCATION/TRAINING PROGRAM

## 2024-12-04 PROCEDURE — 83735 ASSAY OF MAGNESIUM: CPT | Performed by: PATHOLOGY

## 2024-12-04 PROCEDURE — 82306 VITAMIN D 25 HYDROXY: CPT | Performed by: INTERNAL MEDICINE

## 2024-12-04 PROCEDURE — 80061 LIPID PANEL: CPT | Performed by: PATHOLOGY

## 2024-12-04 PROCEDURE — 81001 URINALYSIS AUTO W/SCOPE: CPT | Performed by: PATHOLOGY

## 2024-12-04 RX ORDER — DIPHENOXYLATE HYDROCHLORIDE AND ATROPINE SULFATE 2.5; .025 MG/1; MG/1
1 TABLET ORAL 4 TIMES DAILY PRN
Qty: 60 TABLET | Refills: 0 | Status: SHIPPED | OUTPATIENT
Start: 2024-12-04

## 2024-12-04 RX ORDER — DIPHENOXYLATE HYDROCHLORIDE AND ATROPINE SULFATE 2.5; .025 MG/1; MG/1
1 TABLET ORAL 4 TIMES DAILY PRN
Qty: 60 TABLET | Refills: 0 | Status: CANCELLED | OUTPATIENT
Start: 2024-12-04

## 2024-12-04 RX ORDER — IOPAMIDOL 755 MG/ML
88 INJECTION, SOLUTION INTRAVASCULAR ONCE
Status: COMPLETED | OUTPATIENT
Start: 2024-12-04 | End: 2024-12-04

## 2024-12-04 RX ORDER — SORAFENIB 200 MG/1
200 TABLET, FILM COATED ORAL 2 TIMES DAILY
Qty: 60 TABLET | Refills: 0 | Status: SHIPPED | OUTPATIENT
Start: 2024-12-09

## 2024-12-04 RX ADMIN — IOPAMIDOL 88 ML: 755 INJECTION, SOLUTION INTRAVASCULAR at 09:47

## 2024-12-04 NOTE — DISCHARGE INSTRUCTIONS

## 2024-12-04 NOTE — TELEPHONE ENCOUNTER
Oral Chemotherapy Monitoring Program  Lab Follow Up    Patient currently on sorafenib therapy for HCC.    Reviewed lab results from 12/4/24.    Labs:  _  Result Component Current Result Ref Range   Sodium 140 (12/4/2024) 135 - 145 mmol/L     _  Result Component Current Result Ref Range   Potassium 4.3 (12/4/2024) 3.4 - 5.3 mmol/L     _  Result Component Current Result Ref Range   Calcium 8.4 (L) (12/4/2024) 8.8 - 10.4 mg/dL     _  Result Component Current Result Ref Range   Magnesium 1.6 (L) (12/4/2024) 1.7 - 2.3 mg/dL     _  Result Component Current Result Ref Range   Phosphorus 3.2 (12/4/2024) 2.5 - 4.5 mg/dL     _  Result Component Current Result Ref Range   Albumin 4.3 (12/4/2024) 3.5 - 5.2 g/dL     _  Result Component Current Result Ref Range   Urea Nitrogen 34.0 (H) (12/4/2024) 8.0 - 23.0 mg/dL     _  Result Component Current Result Ref Range   Creatinine POCT 1.7 (H) (12/4/2024) 0.7 - 1.3 mg/dL       _  Result Component Current Result Ref Range   AST 33 (12/4/2024) 0 - 45 U/L     _  Result Component Current Result Ref Range   ALT 32 (12/4/2024) 0 - 70 U/L     _  Result Component Current Result Ref Range   Bilirubin Total 0.4 (12/4/2024) <=1.2 mg/dL       _  Result Component Current Result Ref Range   WBC Count 6.9 (12/4/2024) 4.0 - 11.0 10e3/uL     _  Result Component Current Result Ref Range   Hemoglobin 10.6 (L) (12/4/2024) 13.3 - 17.7 g/dL     _  Result Component Current Result Ref Range   Platelet Count 181 (12/4/2024) 150 - 450 10e3/uL     _  Result Component Current Result Ref Range   Absolute Neutrophils 5.2 (12/4/2024) 1.6 - 8.3 10e3/uL       Assessment & Plan:  Cr 1.7 (high), Crcl 49.4 Current sorafenib dose is appropriate.  All other labs wnl.    Follow-Up:  12/9/24 with Cherelle and will review CT results from 12/4/24    Curtis Butler PharmD, BCOP  12/4/2024

## 2024-12-05 ENCOUNTER — OFFICE VISIT (OUTPATIENT)
Dept: FAMILY MEDICINE | Facility: CLINIC | Age: 59
End: 2024-12-05
Payer: MEDICARE

## 2024-12-05 ENCOUNTER — TELEPHONE (OUTPATIENT)
Dept: ENDOCRINOLOGY | Facility: CLINIC | Age: 59
End: 2024-12-05

## 2024-12-05 VITALS
BODY MASS INDEX: 25.18 KG/M2 | WEIGHT: 170 LBS | OXYGEN SATURATION: 98 % | HEART RATE: 91 BPM | TEMPERATURE: 98 F | SYSTOLIC BLOOD PRESSURE: 116 MMHG | DIASTOLIC BLOOD PRESSURE: 68 MMHG | RESPIRATION RATE: 18 BRPM | HEIGHT: 69 IN

## 2024-12-05 DIAGNOSIS — R21 RASH: Primary | ICD-10-CM

## 2024-12-05 DIAGNOSIS — R05.9 COUGH, UNSPECIFIED TYPE: ICD-10-CM

## 2024-12-05 DIAGNOSIS — E11.3293 TYPE 2 DIABETES MELLITUS WITH MILD NONPROLIFERATIVE RETINOPATHY OF BOTH EYES WITHOUT MACULAR EDEMA, UNSPECIFIED WHETHER LONG TERM INSULIN USE (H): ICD-10-CM

## 2024-12-05 DIAGNOSIS — C78.6 MALIGNANT NEOPLASM METASTATIC TO PERITONEUM (H): ICD-10-CM

## 2024-12-05 LAB
EST. AVERAGE GLUCOSE BLD GHB EST-MCNC: 114 MG/DL
HBA1C MFR BLD: 5.6 %

## 2024-12-05 RX ORDER — KETOROLAC TROMETHAMINE 30 MG/ML
1 INJECTION, SOLUTION INTRAMUSCULAR; INTRAVENOUS SEE ADMIN INSTRUCTIONS
Qty: 1 EACH | Refills: 0 | Status: SHIPPED | OUTPATIENT
Start: 2024-12-05

## 2024-12-05 RX ORDER — ALBUTEROL SULFATE 90 UG/1
2 INHALANT RESPIRATORY (INHALATION) EVERY 4 HOURS PRN
Qty: 18 G | Refills: 3 | Status: SHIPPED | OUTPATIENT
Start: 2024-12-05

## 2024-12-05 RX ORDER — TRIAMCINOLONE ACETONIDE 1 MG/G
CREAM TOPICAL 2 TIMES DAILY
Qty: 80 G | Refills: 0 | Status: SHIPPED | OUTPATIENT
Start: 2024-12-05

## 2024-12-05 NOTE — TELEPHONE ENCOUNTER
Medicare requires that the patient gets a Benjamin 3 [place], along with the sensors.  A new Rx was sent to pharmacy and order was updated in Epic.  Per Prescription Modification CPA

## 2024-12-06 NOTE — PROGRESS NOTES
Answers submitted by the patient for this visit:  CKD Visit (Submitted on 11/27/2024)  Chief Complaint: Chronic problems general questions HPI Form  Do you take any over the counter pain medicine?  : Yes   (Submitted on 11/27/2024)  What over the counter medicine are you taking for your pain?  : Tylenol  How often do you take this medicine?: a few times a month  General Questionnaire (Submitted on 11/27/2024)  Chief Complaint: Chronic problems general questions HPI Form  What is the reason for your visit today? : Review treatment status will be done 12/4 and review cardiology reports and kidney water buiod uo  How many servings of fruits and vegetables do you eat daily?: 0-1  On average, how many sweetened beverages do you drink each day (Examples: soda, juice, sweet tea, etc.  Do NOT count diet or artificially sweetened beverages)?: 0  How many minutes a day do you exercise enough to make your heart beat faster?: 60 or more  How many days a week do you exercise enough to make your heart beat faster?: 5  How many days per week do you miss taking your medication?: 0  Questionnaire about: Chronic problems general questions HPI Form (Submitted on 11/27/2024)  Chief Complaint: Chronic problems general questions HPI Form    Assessment & Plan     Rash  Reacting to adhesive. Not open tender warm   - triamcinolone (KENALOG) 0.1 % external cream; Apply topically 2 times daily.    Type 2 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, unspecified whether long term insulin use (H)  At goal  - Hemoglobin A1c; Future    Cough, unspecified type  Call if worse chronic ct rvwd  - albuterol (PROAIR HFA/PROVENTIL HFA/VENTOLIN HFA) 108 (90 Base) MCG/ACT inhaler; Inhale 2 puffs into the lungs every 4 hours as needed for shortness of breath, wheezing or cough.    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 Due to the added complexity in care, I will continue to support Miller in the subsequent management and with ongoing continuity of care.  31 minutes spent by me on the date of the encounter doing chart review, history and exam, documentation and further activities per the note          BMI  Estimated body mass index is 25.09 kg/m  as calculated from the following:    Height as of this encounter: 1.753 m (5' 9.02\").    Weight as of this encounter: 77.1 kg (170 lb).   Weight management plan: Discussed healthy diet and exercise guidelines          Return in about 6 months (around 6/5/2025).    Subjective   Miller is a 59 year old, presenting for the following health issues:  Follow Up (Discuss test results./Chest wound./Medication refill inhaler)      12/5/2024     1:32 PM   Additional Questions   Roomed by KTR     HPI   Overall doing well  About once a day coughs, relieved by ventolin, was couging befoe ace, no other pulm sx  DM under control  Past Medical History:   Diagnosis Date    Anemia 2013    Arthritis     BPH (benign prostatic hyperplasia)     CAD (coronary artery disease) 04/01/2019    Cholelithiasis     Conductive hearing loss 08/16/2017    Depressive disorder 1986    Suffer effects throughout life    Gastroesophageal reflux disease 12/01/2014    HCC (hepatocellular carcinoma) (H) 01/22/2019    History of blood transfusion 2019    Had blood transfussion until Dec 2022    History of diabetic retinopathy 07/2018    HTN (hypertension) 11/20/2019    Hyperlipidemia     Liver cirrhosis secondary to ESTRADA (H)     Liver transplanted (H) 11/11/2019    Portal vein thrombosis 04/11/2019    SCC (squamous cell carcinoma) 02/15/2023    02/15/23:  Left temporal scalp    Type II diabetes mellitus (H)      Past Surgical History:   Procedure Laterality Date    ABDOMEN SURGERY  11/11/2019    Had Liver Transplant    BIOPSY  12/2022    Had biopsy " done will have additional procedure 2/15/2023    BYPASS GRAFT ARTERY CORONARY N/A 07/14/2021    Procedure: median sternotomy, on cardiopulmonary bypass, CORONARY ARTERY BYPASS GRAFT (CABG) x2 with left greater saphenous vein endoscopic harvest and left internal mammery artery harvest;  Surgeon: Tom Zapata MD;  Location: [facility] OR    CARDIAC SURGERY  7/2021    Heart Graph. and bypass surgery    CHOLECYSTECTOMY  11/11/2022    Removed with Liver Transplant    COLONOSCOPY      2015    COLONOSCOPY N/A 12/06/2019    Procedure: COLONOSCOPY, WITH POLYPECTOMY AND BIOPSY;  Surgeon: Adam Morton MD;  Location:  GI    CV CENTRAL VENOUS CATHETER PLACEMENT N/A 07/12/2021    Procedure: Central Venous Catheter Placement;  Surgeon: Fermin Polanco MD;  Location:  HEART CARDIAC CATH LAB    CV CORONARY ANGIOGRAM N/A 07/12/2021    Procedure: Coronary Angiogram;  Surgeon: Fermin Polanco MD;  Location:  HE[facility] CARD[facility] CATH LAB    CV HEART CATHETERIZATION WITH POSSIBLE INTERVENTION N/A 02/26/2019    Procedure: CORS;  Surgeon: Jagdish Hoyt MD;  Location:  HEART CARDIAC CATH LAB    CV INTRA AORTIC BALLOON N/A 07/12/2021    Procedure: Intra Aortic Balloon Pump Insertion;  Surgeon: Fermin Polanco MD;  Location:  HEART CARDIAC CATH LAB    ESOPHAGOSCOPY, GASTROSCOPY, DUODENOSCOPY (EGD), COMBINED N/A 11/17/2016    Procedure: COMBINED ESOPHAGOSCOPY, GASTROSCOPY, DUODENOSCOPY (EGD);  Surgeon: Santi Rosas MD;  Location:  GI    ESOPHAGOSCOPY, GASTROSCOPY, DUODENOSCOPY (EGD), COMBINED N/A 11/17/2017    Procedure: COMBINED ESOPHAGOSCOPY, GASTROSCOPY, DUODENOSCOPY (EGD);  EGD;  Surgeon: Santi Rosas MD;  Location:  GI    ESOPHAGOSCOPY, GASTROSCOPY, DUODENOSCOPY (EGD), COMBINED N/A 12/28/2018    Procedure: EGD;  Surgeon: Santi Rosas MD;  Location:  OR    ESOPHAGOSCOPY, GASTROSCOPY, DUODENOSCOPY (EGD), COMBINED N/A 12/06/2019     Procedure: ESOPHAGOGASTRODUODENOSCOPY, WITH BIOPSY;  Surgeon: Adam Morton MD;  Location: [facility]    ESOPHAGOSCOPY, GASTROSCOPY, DUODENOSCOPY (EGD), COMBINED N/A 2020    Procedure: ESOPHAGOGASTRODUODENOSCOPY (EGD);  Surgeon: Santi Rosas MD;  Location:  GI    EXCISE MASS ABDOMEN N/A 2023    Procedure: Laparoscopic lysis of adhesions, laparoscopic resection of abdominal mass;  Surgeon: Ruiz Chu MD;  Location: [facility] OR    HEAD & NECK SURGERY      2017 at [facility].     IMPLANT GOLD WEIGHT EYELID Right 2017    Procedure: IMPLANT WEIGHT EYELID;  Right Upper Eyelid Weight, right tarsal strip lower eyelid;  Surgeon: Milana Malave MD;  [facility]    IR CHEMO EMBOLIZATION  2019    KNEE SURGERY Left     ORTHOPEDIC SURGERY      PAROTIDECTOMY, RADICAL NECK DISSECTION Right 2017    Procedure: PAROTIDECTOMY, RADICAL NECK DISSECTION;  Right Superfacial Parotidectomy , Facial nerve repair. with [facility] facial nerve monitor.;  Surgeon: Asiya Morgan MD;  Location: [facility] OR    PHACOEMULSIFICATION CLEAR CORNEA WITH STANDARD INTRAOCULAR LENS IMPLANT Right 2023    Procedure: PHACOEMULSIFICATION, COMPLEX CATARACT, WITH INTRAOCULAR LENS IMPLANT WITH TRYPAN RIGHT EYE;  Surgeon: Enriqueta Martin MD;  [place] [facility] [place]    PHACOEMULSIFICATION WITH STANDARD INTRAOCULAR LENS IMPLANT Left 2023    Procedure: PHACOEMULSIFICATION, COMPLEX CATARACT, WITH STANDARD INTRAOCULAR LENS IMPLANT INSERTION LEFT EYE;  Surgeon: Enriqueta Martin MD;  Location: [facility]    PICC INSERTION Left 2017    4fr SL BioFlo PICC, 44cm in the L basilic vein w/ tip in the low SVC    RETURN LIVER TRANSPLANT N/A 2019    Procedure: Exploratory laparotomy, hematoma evacuation, abdominal washout;  Surgeon: Александр Ramos MD;  Location: [facility] OR    TRANSPLANT LIVER RECIPIENT  DONOR N/A 2019    Procedure: TRANSPLANT, LIVER, RECIPIENT,  DONOR;  Surgeon:  Александр Ramos MD;  Location: [facility] OR    VASCULAR SURGERY       Current Outpatient Medications   Medication Sig Dispense Refill    acetaminophen (TYLENOL) 325 MG tablet TAKE 1 TABLET BY MOUTH EVERY SIX HOURS AS NEEDED FOR MILD PAIN 100 tablet 3    albuterol (PROAIR HFA/PROVENTIL HFA/VENTOLIN HFA) 108 (90 Base) MCG/ACT inhaler Inhale 2 puffs into the lungs every 4 hours as needed for shortness of breath, wheezing or cough. 18 g 3    ammonium lactate (AMLACTIN) 12 % external cream APPLY TOPICALLY DAILY AS NEEDED FOR DRY SKIN (ON FEET) 140 g 5    benzoyl peroxide 5 % external liquid Use topically in showers as a body wash 226 g 9    blood glucose (NO BRAND SPECIFIED) lancets standard Use to test blood sugar 1 times daily or as directed. 100 each 11    Continuous Glucose  (FREESTYLE CLAUDIA 3 READER) CECILIO 1 each See Admin Instructions. use to read blood sugar per  instructions 1 each 0    Continuous Glucose Sensor (FREESTYLE CLAUDIA 2 SENSOR) MISC 1 each See Admin Instructions Change every 14 days. 7 each 3    Continuous Glucose Sensor (FREESTYLE CLAUDIA 3 PLUS SENSOR) MISC Change every 15 days. 6 each 1    diphenoxylate-atropine (LOMOTIL) 2.5-0.025 MG tablet Take 1 tablet by mouth 4 times daily as needed for diarrhea. 60 tablet 0    ELIQUIS ANTICOAGULANT 5 MG tablet TAKE 1 TABLET BY MOUTH TWICE A DAY 60 tablet 3    empagliflozin (JARDIANCE) 10 MG TABS tablet Take 1 tablet (10 mg) by mouth daily 30 tablet 11    insulin aspart (NOVOLOG FLEXPEN) 100 UNIT/ML pen INJECT 5-10U SUBCUTANEOUSLY FOUR TIMES A DAY ( WITH MEALS AND EVERY NIGHT ) ONE UNIT :8CARB BEFORE MEALS . ALSO ADD ONE UNIT : 50MG/DL 15 mL 11    insulin degludec (TRESIBA FLEXTOUCH) 100 UNIT/ML pen INJECT 14 UNITS SUBCUTANEOUSLY ONCE DAILY 15 mL 3    insulin pen needle (ULTICARE MICRO) 32G X 4 MM miscellaneous USE 5 PER  each 3    ketoconazole (NIZORAL) 2 % external shampoo Apply thin layer topically to scalp in shower (leave on 5 min prior  to rinse); may also use as a body wash 120 mL 11    lamiVUDine (EPIVIR) 100 MG tablet TAKE 1 TABLET BY MOUTH ONCE DAILY 90 tablet 2    lisinopril (ZESTRIL) 10 MG tablet Take 1 tablet (10 mg) by mouth daily. 30 tablet 11    loperamide (IMODIUM) 2 MG capsule TAKE ONE TO TWO CAPSULES BY MOUTH FOUR TIMES A DAY AS NEEDED FOR DIARRHEA (DO NOT TAKE MORE THAN 8 CAPSULES PER DAY). 120 capsule 1    MAGNESIUM OXIDE 400 (240 Mg) MG tablet TAKE 1 TABLET BY MOUTH DAILY 30 tablet 5    metoprolol succinate ER (TOPROL XL) 25 MG 24 hr tablet TAKE 1 TABLET BY MOUTH DAILY 30 tablet 3    Multiple Vitamin (DAILY-GLADIS MULTIVITAMIN) TABS TAKE 1 TABLET BY MOUTH ONCE DAILY 30 tablet 11    mycophenolate (GENERIC EQUIVALENT) 250 MG capsule TAKE TWO CAPSULES BY MOUTH EVERY 12 HOURS 120 capsule 11    NIFEdipine ER OSMOTIC (PROCARDIA XL) 60 MG 24 hr tablet TAKE 1 TABLET(60 MG) BY MOUTH TWICE DAILY 6 tablet 0    NOVOLOG FLEXPEN 100 UNIT/ML soln Use as directed up to 50 units daily. 45 mL 3    omega 3 1000 MG CAPS Take 2,000 mg by mouth 2 times daily. 120 capsule 11    ondansetron (ZOFRAN ODT) 8 MG ODT tab Take 1 tablet (8 mg) by mouth every 8 hours as needed for nausea 15 tablet 3    pantoprazole (PROTONIX) 40 MG EC tablet TAKE 1 TABLET BY MOUTH ONCE DAILY BEFORE BREAKFAST 90 tablet 3    PHOSPHORUS TABLET 250  MG per tablet TAKE 1 TABLET BY MOUTH 4 TIMES DAILY 120 tablet 1    prednisoLONE acetate (PRED FORTE) 1 % ophthalmic suspension       predniSONE (DELTASONE) 5 MG tablet TAKE ONE TABLET BY MOUTH ONCE DAILY 30 tablet 11    rosuvastatin (CRESTOR) 20 MG tablet Take 1 tablet (20 mg) by mouth daily. 30 tablet 11    sodium zirconium cyclosilicate (LOKELMA) 10 g PACK packet Take 10 gram 3x/day for 2 days then 10 grams daily. (Patient taking differently: Take 10 g by mouth daily. Take 10 gram 3x/day for 2 days then 10 grams daily.) 30 packet 11    [START ON 12/9/2024] SORAfenib (NEXAVAR) 200 MG tablet Take 1 tablet (200 mg) by mouth 2 times  daily. On an empty stomach 1 hour before or 2 hours after a meal. 60 tablet 0    tamsulosin (FLOMAX) 0.4 MG capsule TAKE 1 CAPSULE BY MOUTH ONCE DAILY 30 capsule 11    triamcinolone (KENALOG) 0.1 % external cream Apply topically 2 times daily. 80 g 0    UltiCare Alcohol Swabs 70 % PADS 1 each by Other route 4 times daily 400 each 1    vitamin D3 (CHOLECALCIFEROL) 50 mcg (2000 units) tablet TAKE 1 TABLET BY MOUTH ONCE DAILY 30 tablet 8    SORAfenib (NEXAVAR) 200 MG tablet Take 1 tablet (200 mg) by mouth 2 times daily. On an empty stomach 1 hour before or 2 hours after a meal. 60 tablet 0     Current Facility-Administered Medications   Medication Dose Route Frequency Provider Last Rate Last Admin    aflibercept (EYLEA) injection prefilled syringe 2 mg  2 mg Intravitreal Q28 Days Enriqueta Martin MD   2 mg at 23 1144    dexamethasone (OZURDEX) intravitreal implant 0.7 mg  0.7 mg Intravitreal Q2 Months Enriqueta Martin MD        dexamethasone (OZURDEX) intravitreal implant 0.7 mg  0.7 mg Intravitreal Q2 Months Enriqueta Martin MD faricimab-svoa (VABYSMO) intravitreal injection 6 mg  6 mg Intravitreal q28 days Enriqueta Martin MD faricimab-svoa (VABYSMO) intravitreal injection 6 mg  6 mg Intravitreal q28 days Enriqueta Martin MD faricimab-svoa (VABYSMO) intravitreal injection 6 mg  6 mg Intravitreal q28 days Enriqueta Martin MD   6 mg at 24 1001    lidocaine (PF) (XYLOCAINE) injection 1 mL  1 mL Ophthalmic Q2 Months Enriqueta Martin MD         Allergies   Allergen Reactions    Codeine Other (See Comments)     Cannot take due to liver  Cannot tolerate oral narcotics    Seasonal Allergies      Sneezing, coughing, runny and itchy eyes     Family History   Problem Relation Age of Onset    Skin Cancer Mother     Cancer Mother         mastectomy    Diabetes Mother          3/2016    Cerebrovascular Disease Mother         Passed  away in Feb of this year, 80 years old.    Thyroid Disease Mother     Depression Mother     Breast Cancer Mother     Obesity Mother         280 pounds  from this and complications from D    Pancreatic Cancer Father 60    Prostate Cancer Father         Currently in Remission    Macular Degeneration Father     Glaucoma Father     Skin Cancer Father     Coronary Artery Disease Father         Started in his 70's  at 88 in Octobe2023    Colon Cancer Father     Thyroid Disease Sister     Depression Sister     Asthma Sister     Sleep Apnea Brother     Colorectal Cancer Maternal Grandmother     Cancer Maternal Grandmother     Substance Abuse Maternal Grandmother         Alcohol    Prostate Cancer Maternal Grandfather     Substance Abuse Maternal Grandfather         Alcohol    Colorectal Cancer Paternal Grandmother     Asthma Sister         Had since birth    Liver Disease No family hx of     Melanoma No family hx of     Anesthesia Reaction No family hx of     Deep Vein Thrombosis (DVT) No family hx of      Social History     Socioeconomic History    Marital status:      Spouse name: Not on file    Number of children: Not on file    Years of education: Not on file    Highest education level: Bachelor's degree (e.g., BA, AB, BS)   Occupational History    Not on file   Tobacco Use    Smoking status: Former     Types: Dip, chew, snus or snuff     Passive exposure: Past    Smokeless tobacco: Former     Types: Chew     Quit date: 10/31/2017    Tobacco comments:     Quit [place] after Doctor told me in 2017 that if I didn't I would   Vaping Use    Vaping status: Never Used   Substance and Sexual Activity    Alcohol use: No     Comment: quit 1996    Drug use: No    Sexual activity: Not Currently     Partners: Female     Birth control/protection: Condom   Other Topics Concern    Parent/sibling w/ CABG, MI or angioplasty before 65F 55M? No   Social History Narrative    Prior , [facility]     Social  Drivers of Health     Financial Resource Strain: Low Risk  (1/23/2024)    Financial Resource Strain     Within the past 12 months, have you or your family members you live with been unable to get utilities (heat, electricity) when it was really needed?: No   Food Insecurity: Low Risk  (1/23/2024)    Food Insecurity     Within the past 12 months, did you worry that your food would run out before you got money to buy more?: No     Within the past 12 months, did the food you bought just not last and you didn t have money to get more?: No   Transportation Needs: Low Risk  (1/23/2024)    Transportation Needs     Within the past 12 months, has lack of transportation kept you from medical appointments, getting your medicines, non-medical meetings or appointments, work, or from getting things that you need?: No   Physical Activity: Insufficiently Active (10/13/2020)    Exercise Vital Sign     Days of Exercise per Week: 1 day     Minutes of Exercise per Session: 60 min   Stress: Stress Concern Present (10/13/2020)    [employer] [place] [employer] - Occupational Stress Questionnaire     Feeling of Stress : To some extent   Social Connections: Socially Isolated (10/13/2020)    Social Connection and Isolation Panel [NHANES]     Frequency of Communication with Friends and Family: Once a week     Frequency of Social Gatherings with Friends and Family: Once a week     Attends Druze Services: Never     Active Member of Clubs or Organizations: No     Attends Club or Organization Meetings: Never     Marital Status:    Interpersonal Safety: Low Risk  (12/5/2024)    Interpersonal Safety     Do you feel physically and emotionally safe where you currently live?: Yes     Within the past 12 months, have you been hit, slapped, kicked or otherwise physically hurt by someone?: No     Within the past 12 months, have you been humiliated or emotionally abused in other ways by your partner or ex-partner?: No   Housing  "Stability: Low Risk  (1/23/2024)    Housing Stability     Do you have housing? : Yes     Are you worried about losing your housing?: No         Objective    /68 (BP Location: Right arm, Patient Position: Sitting, Cuff Size: Adult Regular)   Pulse 91   Temp 98  F (36.7  C) (Oral)   Resp 18   Ht 1.753 m (5' 9.02\")   Wt 77.1 kg (170 lb)   SpO2 98%   BMI 25.09 kg/m    Body mass index is 25.09 kg/m .  Physical Exam   GENERAL: alert and no distress  NECK: no adenopathy, no asymmetry, masses, or scars  RESP: lungs clear to auscultation - no rales, rhonchi or wheezes  CV: regular rate and rhythm, normal S1 S2, no S3 or S4, no murmur, click or rub, no peripheral edema  ABDOMEN: soft, nontender, no hepatosplenomegaly, no masses and bowel sounds normal  MS: no gross musculoskeletal defects noted, no edema  Three 2 inch patches on chest matching recent patches      Rvwd new ct, labs; no cancer advance on imaging, afp rising, he'll rvw w/ onco  we agree    Signed Electronically by: Lamin Ortiz MD    "

## 2024-12-09 ENCOUNTER — ONCOLOGY VISIT (OUTPATIENT)
Dept: ONCOLOGY | Facility: CLINIC | Age: 59
End: 2024-12-09
Attending: INTERNAL MEDICINE
Payer: MEDICARE

## 2024-12-09 VITALS
WEIGHT: 173.7 LBS | DIASTOLIC BLOOD PRESSURE: 50 MMHG | RESPIRATION RATE: 16 BRPM | TEMPERATURE: 99 F | HEART RATE: 93 BPM | OXYGEN SATURATION: 100 % | BODY MASS INDEX: 25.64 KG/M2 | SYSTOLIC BLOOD PRESSURE: 91 MMHG

## 2024-12-09 DIAGNOSIS — N18.32 ANEMIA OF CHRONIC RENAL FAILURE, STAGE 3B (H): ICD-10-CM

## 2024-12-09 DIAGNOSIS — E11.3293 TYPE 2 DIABETES MELLITUS WITH MILD NONPROLIFERATIVE RETINOPATHY OF BOTH EYES WITHOUT MACULAR EDEMA, UNSPECIFIED WHETHER LONG TERM INSULIN USE (H): ICD-10-CM

## 2024-12-09 DIAGNOSIS — N18.32 STAGE 3B CHRONIC KIDNEY DISEASE (H): ICD-10-CM

## 2024-12-09 DIAGNOSIS — C22.0 HCC (HEPATOCELLULAR CARCINOMA) (H): ICD-10-CM

## 2024-12-09 DIAGNOSIS — D63.1 ANEMIA OF CHRONIC RENAL FAILURE, STAGE 3B (H): ICD-10-CM

## 2024-12-09 DIAGNOSIS — Z94.4 LIVER TRANSPLANT RECIPIENT (H): ICD-10-CM

## 2024-12-09 DIAGNOSIS — C78.6 MALIGNANT NEOPLASM METASTATIC TO PERITONEUM (H): ICD-10-CM

## 2024-12-09 DIAGNOSIS — I82.452 ACUTE DEEP VEIN THROMBOSIS (DVT) OF LEFT PERONEAL VEIN (H): ICD-10-CM

## 2024-12-09 PROCEDURE — G0463 HOSPITAL OUTPT CLINIC VISIT: HCPCS | Performed by: INTERNAL MEDICINE

## 2024-12-09 PROCEDURE — G2211 COMPLEX E/M VISIT ADD ON: HCPCS | Performed by: INTERNAL MEDICINE

## 2024-12-09 PROCEDURE — 99215 OFFICE O/P EST HI 40 MIN: CPT | Performed by: INTERNAL MEDICINE

## 2024-12-09 ASSESSMENT — PAIN SCALES - GENERAL: PAINLEVEL_OUTOF10: EXTREME PAIN (8)

## 2024-12-09 NOTE — NURSING NOTE
"Oncology Rooming Note    December 9, 2024 2:31 PM   Frandy Workman is a 59 year old male who presents for:    Chief Complaint   Patient presents with    Oncology Clinic Visit     HCC (hepatocellular carcinoma)      Initial Vitals: There were no vitals taken for this visit. Estimated body mass index is 25.09 kg/m  as calculated from the following:    Height as of 12/5/24: 1.753 m (5' 9.02\").    Weight as of 12/5/24: 77.1 kg (170 lb). There is no height or weight on file to calculate BSA.  Extreme Pain (8) Comment: Data Unavailable   No LMP for male patient.  Allergies reviewed: Yes  Medications reviewed: No    Medications: Medication refills not needed today.  Pharmacy name entered into [facility]:    [facility] - [place], [place] - [address] [facility] [place], [place] - [address] [facility] [phone]  [facility] [place] [address]    Frailty Screening:   Is the patient here for a new oncology consult visit in cancer care? 2. No      Clinical concerns: Pt would like to know if they should continue taking magnesium - if so, a refill would be needed.        Felicia Hernández            "

## 2024-12-09 NOTE — LETTER
12/9/2024         RE: Frandy Workman  [address] [facility] [facility] [address]        I am seeing Miller Dejesus today in follow-up of metastatic hepatocellular carcinoma in the setting of a prior liver transplant.    He is 59 years old and diagnosed 5 years ago with 2 lesions in his cirrhotic liver treated with TACE followed by liver transplant in 2019.  In 2023 developed peritoneal metastases and was started on therapy with sorafenib.  He did not tolerate full doses due to diarrhea and nausea but is subsequently tolerated 200 mg twice per day with good control of his disease.  At our last visit he had stable disease and returns today for his next planned response assessment.    He reports that globally he been doing fairly well but this weekend he had acute illness after eating crab to which he has a known allergy and had not had in the last 30 years.  Developed severe exacerbation of his chronic diarrhea.  The diarrhea continues today but seems better controlled with his antidiarrheals, though he thinks his weight is probably down about 5 pounds.  He has been working hard at pushing fluids (and in fact drink most of a liter of Gatorade during her visit today.) he has had no fevers chills or sweats.  He has no dizziness or lightheadedness.  There has been no diarrheal illness in the people he shared his meal with.  His chronic ankle swelling is probably a little bit worse than usual for him.  His usual pain in both legs had been worse recently but after a recent chiropractic visit that seems better to him.  He notes a little bit of respiratory congestion in the past week.    On exam today he appears chronically ill and older than his stated age but in no acute distress.  His measured blood pressure today was a little bit lower than typical for him at 91/50 and his heart rate was about 90.    I personally reviewed his CT scan or the results with him.  Shows his peritoneal nodules to be stable and no new sites of  disease.    His lab work shows a mild increase in his alpha-fetoprotein at 17.  His electrolytes were normal and his renal function stable with a creatinine of 1.6 (this was a few days ago prior to his acute illness.) his bilirubin, albumin and liver enzymes were normal.  He has stable mild normocytic anemia and otherwise unremarkable blood counts.    Assessment/plan:  1.  Hepatocellular carcinoma with peritoneal metastasis in a patient with a prior liver transplant.  His disease is stable and is reduced dose sorafenib.  Aside from his acute diarrheal illness, he has been tolerating this well and we will plan on continuing on with his same dose and schedule.  We will reassess his disease status again in about 3 months.  2.  Acute diarrheal illness superimposed on chronic mild diarrhea from the sorafenib.  He gives a pretty clear story of a prior allergy to crab and is quite sure this acute illness was related to his dietary indiscretion.  He is increased his antidiarrheals and is pushing hard on his fluids.  Given his history of renal failure related to getting too dehydrated encouraged him and his oral fluid intake and asked that if his diarrhea is not markedly improved over the next day or 2 you let us know so we can get him back in for some IV fluids.  3.  Chronic renal failure.  He has ongoing follow-up with nephrology.  4.  Status post liver transplant.  He has ongoing follow-up with hepatology.          Miguel Villarreal MD

## 2024-12-09 NOTE — TELEPHONE ENCOUNTER
vitamin D3 (CHOLECALCIFEROL) 50 mcg (2000 units) tablet   Disp-30 tablet, R-8,   Start: 03/25/2024 12/5/2024  [facility] Lamin Parker MD  Family Medicine    Routed because: not on protocol for assc dx.

## 2024-12-09 NOTE — TELEPHONE ENCOUNTER
Pt had an appt last week.      Vitamin Supplements Protocol Failed    Rerun Protocol (12/9/2024 2:49 PM)    Medication indicated for associated diagnosis    The medication is prescribed for one or more of the following conditions:                Vitamin deficiency              Osteopenia              Osteoporosis              Nutrition counseling              Nutrition education              Feeding ability finding              Bone density finding              Morbid Obesity              History of bypass of stomach              Idiopathic peripheral neuropathy              General examination of patient              Vitamin D deficiency              Folate deficiency anemia due to dietary causes              Sleeve resection of stomach              Rheumatoid factor level - finding              Crohn's disease              Psoriatic arthritis              Transplant of Kidney              Arthropathy              Alcoholism              Malabsorption syndrome              Disorder of bone and articular cartilage              Cystic Fibrosis  Bronchiectasis

## 2024-12-09 NOTE — LETTER
12/9/2024      Frandy Workman  [address] [facility] [address]      Dear Colleague,    Thank you for referring your patient, Frandy Workman, to the [facility]. Please see a copy of my visit note below.      I am seeing Miller Dejesus today in follow-up of metastatic hepatocellular carcinoma in the setting of a prior liver transplant.    He is 59 years old and diagnosed 5 years ago with 2 lesions in his cirrhotic liver treated with TACE followed by liver transplant in 2019.  In 2023 developed peritoneal metastases and was started on therapy with sorafenib.  He did not tolerate full doses due to diarrhea and nausea but is subsequently tolerated 200 mg twice per day with good control of his disease.  At our last visit he had stable disease and returns today for his next planned response assessment.    He reports that globally he been doing fairly well but this weekend he had acute illness after eating crab to which he has a known allergy and had not had in the last 30 years.  Developed severe exacerbation of his chronic diarrhea.  The diarrhea continues today but seems better controlled with his antidiarrheals, though he thinks his weight is probably down about 5 pounds.  He has been working hard at pushing fluids (and in fact drink most of a liter of Gatorade during her visit today.) he has had no fevers chills or sweats.  He has no dizziness or lightheadedness.  There has been no diarrheal illness in the people he shared his meal with.  His chronic ankle swelling is probably a little bit worse than usual for him.  His usual pain in both legs had been worse recently but after a recent chiropractic visit that seems better to him.  He notes a little bit of respiratory congestion in the past week.    On exam today he appears chronically ill and older than his stated age but in no acute distress.  His measured blood pressure today was a little bit lower than typical for him at 91/50 and  his heart rate was about 90.    I personally reviewed his CT scan or the results with him.  Shows his peritoneal nodules to be stable and no new sites of disease.    His lab work shows a mild increase in his alpha-fetoprotein at 17.  His electrolytes were normal and his renal function stable with a creatinine of 1.6 (this was a few days ago prior to his acute illness.) his bilirubin, albumin and liver enzymes were normal.  He has stable mild normocytic anemia and otherwise unremarkable blood counts.    Assessment/plan:  1.  Hepatocellular carcinoma with peritoneal metastasis in a patient with a prior liver transplant.  His disease is stable and is reduced dose sorafenib.  Aside from his acute diarrheal illness, he has been tolerating this well and we will plan on continuing on with his same dose and schedule.  We will reassess his disease status again in about 3 months.  2.  Acute diarrheal illness superimposed on chronic mild diarrhea from the sorafenib.  He gives a pretty clear story of a prior allergy to crab and is quite sure this acute illness was related to his dietary indiscretion.  He is increased his antidiarrheals and is pushing hard on his fluids.  Given his history of renal failure related to getting too dehydrated encouraged him and his oral fluid intake and asked that if his diarrhea is not markedly improved over the next day or 2 you let us know so we can get him back in for some IV fluids.  3.  Chronic renal failure.  He has ongoing follow-up with nephrology.  4.  Status post liver transplant.  He has ongoing follow-up with hepatology.        Again, thank you for allowing me to participate in the care of your patient.      Sincerely,    Miguel Villarreal MD

## 2024-12-09 NOTE — PROGRESS NOTES
I am seeing Miller Dejesus today in follow-up of metastatic hepatocellular carcinoma in the setting of a prior liver transplant.    He is 59 years old and diagnosed 5 years ago with 2 lesions in his cirrhotic liver treated with TACE followed by liver transplant in 2019.  In 2023 developed peritoneal metastases and was started on therapy with sorafenib.  He did not tolerate full doses due to diarrhea and nausea but is subsequently tolerated 200 mg twice per day with good control of his disease.  At our last visit he had stable disease and returns today for his next planned response assessment.    He reports that globally he been doing fairly well but this weekend he had acute illness after eating crab to which he has a known allergy and had not had in the last 30 years.  Developed severe exacerbation of his chronic diarrhea.  The diarrhea continues today but seems better controlled with his antidiarrheals, though he thinks his weight is probably down about 5 pounds.  He has been working hard at pushing fluids (and in fact drink most of a liter of Gatorade during her visit today.) he has had no fevers chills or sweats.  He has no dizziness or lightheadedness.  There has been no diarrheal illness in the people he shared his meal with.  His chronic ankle swelling is probably a little bit worse than usual for him.  His usual pain in both legs had been worse recently but after a recent chiropractic visit that seems better to him.  He notes a little bit of respiratory congestion in the past week.    On exam today he appears chronically ill and older than his stated age but in no acute distress.  His measured blood pressure today was a little bit lower than typical for him at 91/50 and his heart rate was about 90.    I personally reviewed his CT scan or the results with him.  Shows his peritoneal nodules to be stable and no new sites of disease.    His lab work shows a mild increase in his alpha-fetoprotein at 17.  His  electrolytes were normal and his renal function stable with a creatinine of 1.6 (this was a few days ago prior to his acute illness.) his bilirubin, albumin and liver enzymes were normal.  He has stable mild normocytic anemia and otherwise unremarkable blood counts.    Assessment/plan:  1.  Hepatocellular carcinoma with peritoneal metastasis in a patient with a prior liver transplant.  His disease is stable and is reduced dose sorafenib.  Aside from his acute diarrheal illness, he has been tolerating this well and we will plan on continuing on with his same dose and schedule.  We will reassess his disease status again in about 3 months.  2.  Acute diarrheal illness superimposed on chronic mild diarrhea from the sorafenib.  He gives a pretty clear story of a prior allergy to crab and is quite sure this acute illness was related to his dietary indiscretion.  He is increased his antidiarrheals and is pushing hard on his fluids.  Given his history of renal failure related to getting too dehydrated encouraged him and his oral fluid intake and asked that if his diarrhea is not markedly improved over the next day or 2 you let us know so we can get him back in for some IV fluids.  3.  Chronic renal failure.  He has ongoing follow-up with nephrology.  4.  Status post liver transplant.  He has ongoing follow-up with hepatology.

## 2024-12-10 ENCOUNTER — MYC MEDICAL ADVICE (OUTPATIENT)
Dept: ONCOLOGY | Facility: CLINIC | Age: 59
End: 2024-12-10
Payer: MEDICARE

## 2024-12-10 DIAGNOSIS — N18.32 STAGE 3B CHRONIC KIDNEY DISEASE (CKD) (H): Primary | ICD-10-CM

## 2024-12-10 DIAGNOSIS — R19.7 DIARRHEA: Primary | ICD-10-CM

## 2024-12-10 RX ORDER — CHOLECALCIFEROL (VITAMIN D3) 50 MCG
1 TABLET ORAL DAILY
Qty: 30 TABLET | Refills: 8 | Status: SHIPPED | OUTPATIENT
Start: 2024-12-10

## 2024-12-11 ENCOUNTER — INFUSION THERAPY VISIT (OUTPATIENT)
Dept: ONCOLOGY | Facility: CLINIC | Age: 59
End: 2024-12-11
Payer: MEDICARE

## 2024-12-11 ENCOUNTER — LAB (OUTPATIENT)
Dept: LAB | Facility: CLINIC | Age: 59
End: 2024-12-11
Payer: MEDICARE

## 2024-12-11 ENCOUNTER — ONCOLOGY VISIT (OUTPATIENT)
Dept: ONCOLOGY | Facility: CLINIC | Age: 59
End: 2024-12-11
Payer: MEDICARE

## 2024-12-11 VITALS
DIASTOLIC BLOOD PRESSURE: 63 MMHG | RESPIRATION RATE: 16 BRPM | SYSTOLIC BLOOD PRESSURE: 113 MMHG | OXYGEN SATURATION: 100 % | TEMPERATURE: 97.7 F | BODY MASS INDEX: 25.55 KG/M2 | HEART RATE: 98 BPM | WEIGHT: 173.1 LBS

## 2024-12-11 DIAGNOSIS — R19.7 DIARRHEA, UNSPECIFIED TYPE: Primary | ICD-10-CM

## 2024-12-11 DIAGNOSIS — D63.1 ANEMIA OF CHRONIC RENAL FAILURE, STAGE 3B (H): ICD-10-CM

## 2024-12-11 DIAGNOSIS — R19.7 DIARRHEA: ICD-10-CM

## 2024-12-11 DIAGNOSIS — C78.6 MALIGNANT NEOPLASM METASTATIC TO PERITONEUM (H): ICD-10-CM

## 2024-12-11 DIAGNOSIS — C22.0 HCC (HEPATOCELLULAR CARCINOMA) (H): ICD-10-CM

## 2024-12-11 DIAGNOSIS — N18.32 ANEMIA OF CHRONIC RENAL FAILURE, STAGE 3B (H): ICD-10-CM

## 2024-12-11 DIAGNOSIS — C22.0 HCC (HEPATOCELLULAR CARCINOMA) (H): Primary | ICD-10-CM

## 2024-12-11 LAB
ALBUMIN SERPL BCG-MCNC: 4.2 G/DL (ref 3.5–5.2)
ALP SERPL-CCNC: 97 U/L (ref 40–150)
ALT SERPL W P-5'-P-CCNC: 32 U/L (ref 0–70)
ANION GAP SERPL CALCULATED.3IONS-SCNC: 16 MMOL/L (ref 7–15)
AST SERPL W P-5'-P-CCNC: 33 U/L (ref 0–45)
BASOPHILS # BLD AUTO: 0 10E3/UL (ref 0–0.2)
BASOPHILS NFR BLD AUTO: 0 %
BILIRUB SERPL-MCNC: 0.2 MG/DL
BUN SERPL-MCNC: 49.1 MG/DL (ref 8–23)
CALCIUM SERPL-MCNC: 7.7 MG/DL (ref 8.8–10.4)
CHLORIDE SERPL-SCNC: 106 MMOL/L (ref 98–107)
CREAT SERPL-MCNC: 1.9 MG/DL (ref 0.67–1.17)
EGFRCR SERPLBLD CKD-EPI 2021: 40 ML/MIN/1.73M2
EOSINOPHIL # BLD AUTO: 0.1 10E3/UL (ref 0–0.7)
EOSINOPHIL NFR BLD AUTO: 1 %
ERYTHROCYTE [DISTWIDTH] IN BLOOD BY AUTOMATED COUNT: 15.9 % (ref 10–15)
GLUCOSE SERPL-MCNC: 119 MG/DL (ref 70–99)
HCO3 SERPL-SCNC: 20 MMOL/L (ref 22–29)
HCT VFR BLD AUTO: 31.7 % (ref 40–53)
HGB BLD-MCNC: 9.9 G/DL (ref 13.3–17.7)
IMM GRANULOCYTES # BLD: 0 10E3/UL
IMM GRANULOCYTES NFR BLD: 0 %
LYMPHOCYTES # BLD AUTO: 0.6 10E3/UL (ref 0.8–5.3)
LYMPHOCYTES NFR BLD AUTO: 9 %
MAGNESIUM SERPL-MCNC: 1.4 MG/DL (ref 1.7–2.3)
MCH RBC QN AUTO: 30.1 PG (ref 26.5–33)
MCHC RBC AUTO-ENTMCNC: 31.2 G/DL (ref 31.5–36.5)
MCV RBC AUTO: 96 FL (ref 78–100)
MONOCYTES # BLD AUTO: 0.2 10E3/UL (ref 0–1.3)
MONOCYTES NFR BLD AUTO: 3 %
NEUTROPHILS # BLD AUTO: 5.7 10E3/UL (ref 1.6–8.3)
NEUTROPHILS NFR BLD AUTO: 86 %
NRBC # BLD AUTO: 0 10E3/UL
NRBC BLD AUTO-RTO: 0 /100
PHOSPHATE SERPL-MCNC: 4.5 MG/DL (ref 2.5–4.5)
PLATELET # BLD AUTO: 169 10E3/UL (ref 150–450)
POTASSIUM SERPL-SCNC: 4.5 MMOL/L (ref 3.4–5.3)
PROT SERPL-MCNC: 7.2 G/DL (ref 6.4–8.3)
RBC # BLD AUTO: 3.29 10E6/UL (ref 4.4–5.9)
SODIUM SERPL-SCNC: 142 MMOL/L (ref 135–145)
WBC # BLD AUTO: 6.7 10E3/UL (ref 4–11)

## 2024-12-11 PROCEDURE — 83735 ASSAY OF MAGNESIUM: CPT | Performed by: PATHOLOGY

## 2024-12-11 PROCEDURE — 250N000011 HC RX IP 250 OP 636

## 2024-12-11 PROCEDURE — 258N000003 HC RX IP 258 OP 636: Performed by: INTERNAL MEDICINE

## 2024-12-11 PROCEDURE — 85025 COMPLETE CBC W/AUTO DIFF WBC: CPT | Performed by: PATHOLOGY

## 2024-12-11 PROCEDURE — 80053 COMPREHEN METABOLIC PANEL: CPT | Performed by: PATHOLOGY

## 2024-12-11 PROCEDURE — G0463 HOSPITAL OUTPT CLINIC VISIT: HCPCS

## 2024-12-11 PROCEDURE — 84100 ASSAY OF PHOSPHORUS: CPT | Performed by: PATHOLOGY

## 2024-12-11 PROCEDURE — 36415 COLL VENOUS BLD VENIPUNCTURE: CPT | Performed by: PATHOLOGY

## 2024-12-11 RX ORDER — MEPERIDINE HYDROCHLORIDE 25 MG/ML
25 INJECTION INTRAMUSCULAR; INTRAVENOUS; SUBCUTANEOUS EVERY 30 MIN PRN
OUTPATIENT
Start: 2024-12-11

## 2024-12-11 RX ORDER — METHYLPREDNISOLONE SODIUM SUCCINATE 125 MG/2ML
125 INJECTION INTRAMUSCULAR; INTRAVENOUS
Start: 2024-12-11

## 2024-12-11 RX ORDER — MAGNESIUM SULFATE HEPTAHYDRATE 40 MG/ML
2 INJECTION, SOLUTION INTRAVENOUS ONCE
Status: COMPLETED | OUTPATIENT
Start: 2024-12-11 | End: 2024-12-11

## 2024-12-11 RX ORDER — MAGNESIUM SULFATE HEPTAHYDRATE 40 MG/ML
2 INJECTION, SOLUTION INTRAVENOUS ONCE
Status: CANCELLED
Start: 2024-12-11 | End: 2024-12-11

## 2024-12-11 RX ORDER — ALBUTEROL SULFATE 90 UG/1
1-2 INHALANT RESPIRATORY (INHALATION)
Start: 2024-12-11

## 2024-12-11 RX ORDER — DIPHENHYDRAMINE HYDROCHLORIDE 50 MG/ML
50 INJECTION INTRAMUSCULAR; INTRAVENOUS
Start: 2024-12-11

## 2024-12-11 RX ORDER — EPINEPHRINE 1 MG/ML
0.3 INJECTION, SOLUTION INTRAMUSCULAR; SUBCUTANEOUS EVERY 5 MIN PRN
OUTPATIENT
Start: 2024-12-11

## 2024-12-11 RX ORDER — ALBUTEROL SULFATE 0.83 MG/ML
2.5 SOLUTION RESPIRATORY (INHALATION)
OUTPATIENT
Start: 2024-12-11

## 2024-12-11 RX ORDER — HEPARIN SODIUM,PORCINE 10 UNIT/ML
5-20 VIAL (ML) INTRAVENOUS DAILY PRN
OUTPATIENT
Start: 2024-12-11

## 2024-12-11 RX ORDER — HEPARIN SODIUM (PORCINE) LOCK FLUSH IV SOLN 100 UNIT/ML 100 UNIT/ML
5 SOLUTION INTRAVENOUS
OUTPATIENT
Start: 2024-12-11

## 2024-12-11 RX ADMIN — MAGNESIUM SULFATE 2 G: 2 INJECTION INTRAVENOUS at 14:58

## 2024-12-11 RX ADMIN — SODIUM CHLORIDE 1000 ML: 9 INJECTION, SOLUTION INTRAVENOUS at 14:58

## 2024-12-11 ASSESSMENT — PAIN SCALES - GENERAL: PAINLEVEL_OUTOF10: MODERATE PAIN (5)

## 2024-12-11 NOTE — PROGRESS NOTES
Oncology/Hematology Visit Note  Dec 11, 2024    Reason for Visit: acute add on for diarrhea      History of Present Illness: Frandy Workman is a 59 year old male with HCC. He is currently undergoing treatment with sorafenib. He was seen earlier this week for routine follow up and also assessed for acute diarrhea.      Please see previous notes for further details on the patient's history.     Interval History:  Miller presents today for follow up evaluation of ongoing diarrhea.   Reports his stools have slowed some, had 4 episodes of watery diarrhea today so far. Yesterday, he had 6 total. He is taking 8 tablets of imodium daily. Denies any abdominal pain or cramping. Drinking adequate fluids daily. Appetite is stable, eating his normal amount each day. Has only vomited once since diarrhea started 5 days ago. Taking antiemetics sporadically as needed for mild nausea.     Having lower leg cramps but this is chronic and unchanged.   Reports increased swelling around abdomen and ankles. Has chronic abdominal swelling.   Denies shortness of breath of difficulty breathing with increased abdominal swelling.     No fever, chills or body aches. No urinary concerns.     Review of Systems:  Patient denies any of the following except if noted above: fevers, chills, difficulty with energy, vision or hearing changes, chest pain, dyspnea, abdominal pain, nausea, vomiting, diarrhea, constipation, urinary concerns, headaches, numbness, tingling, issues with sleep or mood. He/She also denies lumps, bumps, rashes or skin lesions, bleeding or bruising issues.    Current Outpatient Medications   Medication Sig Dispense Refill    acetaminophen (TYLENOL) 325 MG tablet TAKE 1 TABLET BY MOUTH EVERY SIX HOURS AS NEEDED FOR MILD PAIN 100 tablet 3    albuterol (PROAIR HFA/PROVENTIL HFA/VENTOLIN HFA) 108 (90 Base) MCG/ACT inhaler Inhale 2 puffs into the lungs every 4 hours as needed for shortness of breath, wheezing or cough. 18 g 3    ammonium  lactate (AMLACTIN) 12 % external cream APPLY TOPICALLY DAILY AS NEEDED FOR DRY SKIN (ON FEET) 140 g 5    benzoyl peroxide 5 % external liquid Use topically in showers as a body wash 226 g 9    blood glucose (NO BRAND SPECIFIED) lancets standard Use to test blood sugar 1 times daily or as directed. 100 each 11    Continuous Glucose  (FREESTYLE CLAUDIA 3 READER) CECILIO 1 each See Admin Instructions. use to read blood sugar per  instructions 1 each 0    Continuous Glucose Sensor (FREESTYLE CLAUDIA 3 PLUS SENSOR) MISC Change every 15 days. 6 each 1    diphenoxylate-atropine (LOMOTIL) 2.5-0.025 MG tablet Take 1 tablet by mouth 4 times daily as needed for diarrhea. 60 tablet 0    ELIQUIS ANTICOAGULANT 5 MG tablet TAKE 1 TABLET BY MOUTH TWICE A DAY 60 tablet 3    empagliflozin (JARDIANCE) 10 MG TABS tablet Take 1 tablet (10 mg) by mouth daily 30 tablet 11    insulin degludec (TRESIBA FLEXTOUCH) 100 UNIT/ML pen INJECT 14 UNITS SUBCUTANEOUSLY ONCE DAILY 15 mL 3    insulin pen needle (ULTICARE MICRO) 32G X 4 MM miscellaneous USE 5 PER  each 3    ketoconazole (NIZORAL) 2 % external shampoo Apply thin layer topically to scalp in shower (leave on 5 min prior to rinse); may also use as a body wash 120 mL 11    lamiVUDine (EPIVIR) 100 MG tablet TAKE 1 TABLET BY MOUTH ONCE DAILY 90 tablet 2    lisinopril (ZESTRIL) 10 MG tablet Take 1 tablet (10 mg) by mouth daily. 30 tablet 11    loperamide (IMODIUM) 2 MG capsule TAKE ONE TO TWO CAPSULES BY MOUTH FOUR TIMES A DAY AS NEEDED FOR DIARRHEA (DO NOT TAKE MORE THAN 8 CAPSULES PER DAY). 120 capsule 1    MAGNESIUM OXIDE 400 (240 Mg) MG tablet TAKE 1 TABLET BY MOUTH DAILY 30 tablet 5    metoprolol succinate ER (TOPROL XL) 25 MG 24 hr tablet TAKE 1 TABLET BY MOUTH DAILY 30 tablet 3    Multiple Vitamin (DAILY-GLADIS MULTIVITAMIN) TABS TAKE 1 TABLET BY MOUTH ONCE DAILY 30 tablet 11    mycophenolate (GENERIC EQUIVALENT) 250 MG capsule TAKE TWO CAPSULES BY MOUTH EVERY 12 HOURS  120 capsule 11    NIFEdipine ER OSMOTIC (PROCARDIA XL) 60 MG 24 hr tablet TAKE 1 TABLET(60 MG) BY MOUTH TWICE DAILY 6 tablet 0    omega 3 1000 MG CAPS Take 2,000 mg by mouth 2 times daily. 120 capsule 11    ondansetron (ZOFRAN ODT) 8 MG ODT tab Take 1 tablet (8 mg) by mouth every 8 hours as needed for nausea 15 tablet 3    pantoprazole (PROTONIX) 40 MG EC tablet TAKE 1 TABLET BY MOUTH ONCE DAILY BEFORE BREAKFAST 90 tablet 3    PHOSPHORUS TABLET 250  MG per tablet TAKE 1 TABLET BY MOUTH 4 TIMES DAILY 120 tablet 1    prednisoLONE acetate (PRED FORTE) 1 % ophthalmic suspension       predniSONE (DELTASONE) 5 MG tablet TAKE ONE TABLET BY MOUTH ONCE DAILY 30 tablet 11    rosuvastatin (CRESTOR) 20 MG tablet Take 1 tablet (20 mg) by mouth daily. 30 tablet 11    sodium zirconium cyclosilicate (LOKELMA) 10 g PACK packet Take 10 gram 3x/day for 2 days then 10 grams daily. (Patient taking differently: Take 10 g by mouth daily. Take 10 gram 3x/day for 2 days then 10 grams daily.) 30 packet 11    SORAfenib (NEXAVAR) 200 MG tablet Take 1 tablet (200 mg) by mouth 2 times daily. On an empty stomach 1 hour before or 2 hours after a meal. 60 tablet 0    tamsulosin (FLOMAX) 0.4 MG capsule TAKE 1 CAPSULE BY MOUTH ONCE DAILY 30 capsule 11    triamcinolone (KENALOG) 0.1 % external cream Apply topically 2 times daily. 80 g 0    UltiCare Alcohol Swabs 70 % PADS 1 each by Other route 4 times daily 400 each 1    VITAMIN D3 50 MCG (2000 UT) tablet TAKE 1 TABLET BY MOUTH ONCE DAILY 30 tablet 8    Continuous Glucose Sensor (FREESTYLE CLAUDIA 2 SENSOR) [facility] 1 each See Admin Instructions Change every 14 days. 7 each 3    insulin aspart (NOVOLOG FLEXPEN) 100 UNIT/ML pen INJECT 5-10U SUBCUTANEOUSLY FOUR TIMES A DAY ( WITH MEALS AND EVERY NIGHT ) ONE UNIT :8CARB BEFORE MEALS . ALSO ADD ONE UNIT : 50MG/DL 15 mL 11    NOVOLOG FLEXPEN 100 UNIT/ML soln Use as directed up to 50 units daily. 45 mL 3    SORAfenib (NEXAVAR) 200 MG tablet Take 1 tablet  (200 mg) by mouth 2 times daily. On an empty stomach 1 hour before or 2 hours after a meal. 60 tablet 0       Physical Examination:  /63 (BP Location: Left arm, Patient Position: Sitting, Cuff Size: Adult Regular)   Pulse 98   Temp 97.7  F (36.5  C) (Oral)   Resp 16   Wt 78.5 kg (173 lb 1.6 oz)   SpO2 100%   BMI 25.55 kg/m    Wt Readings from Last 5 Encounters:   12/11/24 78.5 kg (173 lb 1.6 oz)   12/09/24 78.8 kg (173 lb 11.2 oz)   12/05/24 77.1 kg (170 lb)   11/18/24 79.4 kg (175 lb)   09/09/24 75.6 kg (166 lb 9.6 oz)     General: The patient is a pleasant male in no acute distress.  HEENT: EOMI, PERRL. Sclerae are anicteric. Oral mucosa is pink and moist with no lesions or thrush.   Lymph: Neck is supple with no lymphadenopathy in the cervical or supraclavicular areas.   Heart: Regular rate and rhythm.   Lungs: Clear to auscultation bilaterally.   Abdomen: Bowel sounds present, soft, nontender with no palpable hepatosplenomegaly or masses.   Extremities: No lower extremity edema noted bilaterally. Compression stockings in place.   Neuro: Cranial nerves II through XII are grossly intact.  Skin: No rashes, petechiae, or bruising noted on exposed skin.    Laboratory Data:  Most Recent 3 CBC's:  Recent Labs   Lab Test 12/11/24  1300 12/04/24  0930 10/23/24  1044   WBC 6.7 6.9 6.2   HGB 9.9* 10.6* 11.3*   MCV 96 98 95    181 152   ANEUTAUTO 5.7 5.2 4.8    Most Recent 3 BMP's:  Recent Labs   Lab Test 12/11/24  1300 12/04/24  0941 12/04/24  0930 10/23/24  1044     --  140 139   POTASSIUM 4.5  --  4.3 4.5   CHLORIDE 106  --  105 103   CO2 20*  --  22 23   BUN 49.1*  --  34.0* 24.7*   CR 1.90* 1.7* 1.58* 1.52*   ANIONGAP 16*  --  13 13   DEBORAH 7.7*  --  8.4* 7.5*   *  --  80 130*   PROTTOTAL 7.2  --  7.3 7.4   ALBUMIN 4.2  --  4.3 4.4    Most Recent 2 LFT's:  Recent Labs   Lab Test 12/11/24  1300 12/04/24  0930   AST 33 33   ALT 32 32   ALKPHOS 97 105   BILITOTAL 0.2 0.4    Most Recent TSH  and T4:  Recent Labs   Lab Test 09/05/24  0958 01/24/20  0837 08/08/18  1246   TSH 1.32   < > 0.49   T4  --   --  1.21    < > = values in this interval not displayed.     Magnesium   Date Value Ref Range Status   12/11/2024 1.4 (L) 1.7 - 2.3 mg/dL Final   06/04/2021 2.2 1.6 - 2.3 mg/dL Final     I reviewed the above labs today.    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     CONTRAST: ISOVUE 370 88cc injected IV without oral contrast    CT scan radiation dose is optimized to minimum requisite dose using  automated dose modulation techniques.    FINDINGS:    Lungs/pleura: No pleural effusion or pneumothorax. Central airways are  patent. No focal consolidation. Stable 4 mm right apical pulmonary  nodule (9/34), previously 4 mm.    Mediastinum: Normal heart size. No pericardial effusion. Multivessel  calcified coronary plaque. Central pulmonary vasculature is patent. No  mediastinal, axillary, or supraclavicular lymphadenopathy. Prominent  esophageal varices.    Liver: Postsurgical changes of liver transplantation with stable mild  soft tissue thickening along the jeff hepatis. The anterior  aspect of  the left hepatic lobe protrudes slightly into a small midline hernia.   No hepatic mass. Unchanged subcentimeter hypodensity in the inferior  right hepatic lobe, too small to accurately characterize. Patent  hepatic vasculature. Unchanged appearance of the proximal right  hepatic artery.    Biliary System: Gallbladder is surgically absent. No extrahepatic  biliary dilation.    Pancreas: No pancreatic ductal dilation. Stable 9 mm hypodensity  within the pancreatic head (5/160), previously 9 mm when measured  similarly.    Adrenal glands: No mass or nodules    Spleen: Enlarged measuring 13.9 cm    Kidneys: No hydronephrosis. Stable 1.6 cm renal cortical hypodensity  (7/64), consistent with simple renal cysts. Adjacent subcentimeter  cortical hypodensities too small to accurately characterize but likely  represent additional cyst.    Gastrointestinal tract: Normal appendix. Normal caliber small and  large bowel..    Mesentery/peritoneum/retroperitoneum: No free fluid or air.  Few  scattered peritoneal nodules are not significantly changed compared to  prior examination. For example, there is a nodule adjacent to the  hepatic flexure of the colon measuring 9 mm (8/347), previously 10 mm,  a 6 mm nodule anterior to the descending colon (8/413), previously 6  mm, and a 5 mm nodule adjacent to a loop of small bowel in the left  lateral abdomen (8/346), previously 6 mm on 9/5/2024 and not  definitively visualized on 8/5/2024.    Lymph nodes: No lymphadenopathy.    Vasculature: Patent major abdominal vasculature.  Left upper  quadrant/esophageal varices.    Pelvis: Urinary bladder is normal.  Prostate and seminal vesicles are  within normal limits.    Osseous structures: No aggressive or acute osseous lesion.  Prior  midline sternotomy without osseous fusion of the sternum. Multilevel  degenerative changes of the spine. Chronic wedging of the T11  vertebral body.      Soft tissues: Small wide necked substernal  ventral hernia containing  fat and the anterior edge of the left hepatic lobe, similar compared  to prior.  Impression: IMPRESSION:   1. Postsurgical changes of liver transplant without evidence of  recurrent hepatocellular carcinoma.  2. Stable scattered peritoneal nodules.  3. Stable hypodense lesion within the pancreatic head, possibly  representing a sidebranch IPMN.  4. Stable 4 mm pulmonary nodule.    I have personally reviewed the examination and initial interpretation  and I agree with the findings.    RAMON SEAY DO         SYSTEM ID:  [id number]    I reviewed the above imaging report today.    Assessment and Plan:    Diarrhea.   Started 5 days ago after eating crab, which he has previously been intolerant to. Bowel movements are still watery, but slowing in frequency. Continues on 8 tablets of imodium daily. No abdominal pain or fever. Appetite and fluid intake are adequate. Vital signs are stable, blood pressure improved from earlier this week when it was soft in clinic. Creatinine elevated to 1.9 today (baseline ~1.3), magnesium low at 1.4.   -IL NS IV bolus today. Will replace magnesium with 2g IV and avoid oral replacement due to diarrhea.   -Stool cultures ordered, awaiting sample from patient.    -Recheck labs on 12/13.  -Continue imodium and pushing oral hydration. Instructed to notify triage if diarrhea does not continue to improve or with any symptoms or concerns.       HCC.   Recent repeat imaging with stable disease. Continues on sorafenib.  Scheduled for next follow up with Dr. Villarreal in March. Patient to call sooner with any concerns.         31 minutes spent on the date of the encounter doing chart review, review of test results, interpretation of tests, patient visit, and documentation     SHANIQUE Dumont CNP

## 2024-12-11 NOTE — TELEPHONE ENCOUNTER
Miller returning  a call about:   If can come in at 1pm for labs (CBC, CMP, Mg, stool cultures) and 1:30pm visit with Alisha Breen to follow up on edema/diarrhea/see if IVF are appropriate. Let him know this was recommended by Dr. Cherelle Bhatt is in agreement with plan.     1008 Scheduling request sent to add onto pt's appts.

## 2024-12-11 NOTE — NURSING NOTE
"Oncology Rooming Note    December 11, 2024 1:24 PM   Frandy Workman is a 59 year old male who presents for:    Chief Complaint   Patient presents with    Oncology Clinic Visit     HCC (hepatocellular carcinoma)     Initial Vitals: /63 (BP Location: Left arm, Patient Position: Sitting, Cuff Size: Adult Regular)   Pulse 98   Temp 97.7  F (36.5  C) (Oral)   Resp 16   Wt 78.5 kg (173 lb 1.6 oz)   SpO2 100%   BMI 25.55 kg/m   Estimated body mass index is 25.55 kg/m  as calculated from the following:    Height as of 12/5/24: 1.753 m (5' 9.02\").    Weight as of this encounter: 78.5 kg (173 lb 1.6 oz). Body surface area is 1.96 meters squared.  Moderate Pain (5) Comment: Data Unavailable   No LMP for male patient.  Allergies reviewed: Yes  Medications reviewed: Yes    Medications: Medication refills not needed today.  Pharmacy name entered into [facility]:    [facility] - [place], [place] - [address] [place] [address] PHARMACY [place], [place] - [address] [place] [address] [phone]  [facility] [place] [address]    Frailty Screening:   Is the patient here for a new oncology consult visit in cancer care? 2. No      Clinical concerns: Foot pain (5).      Maryanne Melchor,  EMT  12/11/2024              "

## 2024-12-11 NOTE — PROGRESS NOTES
Infusion Nursing Note:  Frandy Workman presents today for IVF and magnesium replacement therapy.    Patient seen by provider today: Yes: Alisha Breen NP   present during visit today: Not Applicable.    Note: Patient saw provider today to address concerns. No acute concerns at infusion appointment. Patient reported pain as 5/10 in both feet. Expressed pain is baseline. Requested heat therapy for feet during infusion. Magnesium of 1.4 replaced per therapy plan. Fluids given per therapy plan.     Stool sample was not collected; patient will take home and drop off at a [facility] near home.      Intravenous Access:  Peripheral IV placed.    Treatment Conditions:  Lab Results   Component Value Date     12/11/2024    POTASSIUM 4.5 12/11/2024    MAG 1.4 (L) 12/11/2024    CR 1.90 (H) 12/11/2024    DEBORAH 7.7 (L) 12/11/2024    BILITOTAL 0.2 12/11/2024    ALBUMIN 4.2 12/11/2024    ALT 32 12/11/2024    AST 33 12/11/2024     Results reviewed.      Post Infusion Assessment:  Patient tolerated infusion without incident.  Blood return noted pre and post infusion.  Site patent and intact, free from redness, edema or discomfort.  No evidence of extravasations.  Access discontinued per protocol.       Discharge Plan:   Patient declined prescription refills.  Discharge instructions reviewed with: Patient.  Patient and/or family verbalized understanding of discharge instructions and all questions answered.  AVS to patient via [employer].  Patient will return for provider (Dr. Villarreal) 03/10/25 for next appointment.   Patient discharged in stable condition accompanied by: Self.  Departure Mode: Ambulatory.      Freida Khalil RN

## 2024-12-11 NOTE — TELEPHONE ENCOUNTER
"Oncology Nurse Triage - Diarrhea    Situation:   Frandy reporting ongoing diarrhea via MyC message. Triage RN call to pt to follow up.     Background:   Treating Provider:  Dr. Miguel Villarreal    Date of last office visit: 12/9/24    Is patient on chemotherapy or immunotherapy? Yes: Sorafenib    Has patient started any new medications: Yes  - if yes, what is the new medication: homeopathic detoxing w/ \"natural cleansing foot pads\" x3d. Bamboo, wood vinegar, vitamin C- turns foot pad black (toxins removed), but says it helps with leg pain.    Is patient currently on or recently on antibiotics: No  - if yes, what is the medication:  N/A    Is patient receiving radiation to the pelvis area: No    Per visit notes w/ Dr. Villarreal on 12/9/24, \"Acute diarrheal illness superimposed on chronic mild diarrhea from the sorafenib.  He gives a pretty clear story of a prior allergy to crab and is quite sure this acute illness was related to his dietary indiscretion.  He is increased his antidiarrheals and is pushing hard on his fluids.  Given his history of renal failure related to getting too dehydrated encouraged him and his oral fluid intake and asked that if his diarrhea is not markedly improved over the next day or 2 you let us know so we can get him back in for some IV fluids.\"    Assessment  Onset of symptoms: 5 days     Number of bowel movements in 24 hour period: at least 4-5 episodes    Bowel Characteristics:     Color:   Black chunks with liquid, but said it is not blood     Consistency:   Liquid with some formed stool     Difficult passing stool:  No  No, patient denies straining during bowel movement.     Passing gas: Yes    Associated symptoms: bloating where lining of stomach is, swelling in ankles- plans to talk to nephrology when he sees his new provider next Wednesday.     Oral intake: averaging 128 fluid oz/day, so far today has consumed 104 fluid oz- Gatorade Zero and Body Armor Lyte. Yesterday, ate john soup, " "chicken wrap, blueberry muffin. He also has been drinking 2% milk.  Recently got a new BG reading and said his readings have been \"within range\". At 6am was 110, currently at 186, but has not eaten anything solid yet.     Decrease in urine output:  Yes    Did you take a laxative or stool softener recently? No    Have you taken anything to treat the diarrhea:Yes  Which medication and how much? Loperimide- using 8 tabs in a day and lomotil- taking 2 tabs at HS- said this makes him tired, said he has told Dr. Villarreal this.     Recommendations:   This writer educated on:   Continue w/ prescribed Loperamide and Lomotil as directed. Pt verbalized he is taking max dose of Loperamide.   Avoid fried, fatty, greasy, and spicy foods, high fiber foods  Avoid caffeine, alcohol, and milk products  Continue to push PO fluids    0901 secure chat to Dr. Villarreal to see if IVF is recommended, especially with LE edema. Informed pt will call him back with recommendations.     0937 per Dr. Villarreal, have pt come in for CMP labs and see SERA to start.   0941 secure chat to SERA to check availability. Per Alisha Breen, okay to add on this afternoon with CBC, CMP, magnesium, and stool culture labs prior. Okay to request IVF appt now. If no availability prior to appt, Alisha will call infusion center when pt is in clinic.     0951 LVM for pt requesting call back to triage to review recommendations for 1300 labs, 1330 visit w/ Alisha Breen, and possible IVF after- informed request for fluids has been made but SERA would evaluate prior per Dr. Villarreal. SERA updated that infusion is full so far, asked she call if/when pt in clinic if no appt has been scheduled prior.   0954 message sent to infusion team.   "

## 2024-12-11 NOTE — Clinical Note
12/11/2024      Frandy Workman  [address] [address]      Dear Colleague,    Thank you for referring your patient, Frandy Workman, to the [facility]. Please see a copy of my visit note below.    Oncology/Hematology Visit Note  Dec 11, 2024    Reason for Visit: acute add on for diarrhea      History of Present Illness: Frandy Workman is a 59 year old male with HCC. He is currently undergoing treatment with sorafenib. He was seen earlier this week for routine follow up and also assessed for acute diarrhea.      Please see previous notes for further details on the patient's history.     Interval History:  Miller presents today for follow up evaluation of ongoing diarrhea.   Reports his stools have slowed some, had 4 episodes of watery diarrhea today so far. Yesterday, he had 6 total. He is taking 8 tablets of imodium daily. Denies any abdominal pain or cramping. Drinking adequate fluids daily. Appetite is stable, eating his normal amount each day. Has only vomited once since diarrhea started 5 days ago. Taking antiemetics sporadically as needed for mild nausea.     Having lower leg cramps but this is chronic and unchanged.   Reports increased swelling around abdomen and ankles. Has chronic abdominal swelling.   Denies shortness of breath of difficulty breathing with increased abdominal swelling.     No fever, chills or body aches. No urinary concerns.     Review of Systems:  Patient denies any of the following except if noted above: fevers, chills, difficulty with energy, vision or hearing changes, chest pain, dyspnea, abdominal pain, nausea, vomiting, diarrhea, constipation, urinary concerns, headaches, numbness, tingling, issues with sleep or mood. He/She also denies lumps, bumps, rashes or skin lesions, bleeding or bruising issues.    Current Outpatient Medications   Medication Sig Dispense Refill    acetaminophen (TYLENOL) 325 MG tablet TAKE 1 TABLET BY MOUTH EVERY  SIX HOURS AS NEEDED FOR MILD PAIN 100 tablet 3    albuterol (PROAIR HFA/PROVENTIL HFA/VENTOLIN HFA) 108 (90 Base) MCG/ACT inhaler Inhale 2 puffs into the lungs every 4 hours as needed for shortness of breath, wheezing or cough. 18 g 3    ammonium lactate (AMLACTIN) 12 % external cream APPLY TOPICALLY DAILY AS NEEDED FOR DRY SKIN (ON FEET) 140 g 5    benzoyl peroxide 5 % external liquid Use topically in showers as a body wash 226 g 9    blood glucose (NO BRAND SPECIFIED) lancets standard Use to test blood sugar 1 times daily or as directed. 100 each 11    Continuous Glucose  (FREESTYLE CLAUDIA 3 READER) CECILIO 1 each See Admin Instructions. use to read blood sugar per  instructions 1 each 0    Continuous Glucose Sensor (FREESTYLE CLAUDIA 3 PLUS SENSOR) MISC Change every 15 days. 6 each 1    diphenoxylate-atropine (LOMOTIL) 2.5-0.025 MG tablet Take 1 tablet by mouth 4 times daily as needed for diarrhea. 60 tablet 0    ELIQUIS ANTICOAGULANT 5 MG tablet TAKE 1 TABLET BY MOUTH TWICE A DAY 60 tablet 3    empagliflozin (JARDIANCE) 10 MG TABS tablet Take 1 tablet (10 mg) by mouth daily 30 tablet 11    insulin degludec (TRESIBA FLEXTOUCH) 100 UNIT/ML pen INJECT 14 UNITS SUBCUTANEOUSLY ONCE DAILY 15 mL 3    insulin pen needle (ULTICARE MICRO) 32G X 4 MM miscellaneous USE 5 PER  each 3    ketoconazole (NIZORAL) 2 % external shampoo Apply thin layer topically to scalp in shower (leave on 5 min prior to rinse); may also use as a body wash 120 mL 11    lamiVUDine (EPIVIR) 100 MG tablet TAKE 1 TABLET BY MOUTH ONCE DAILY 90 tablet 2    lisinopril (ZESTRIL) 10 MG tablet Take 1 tablet (10 mg) by mouth daily. 30 tablet 11    loperamide (IMODIUM) 2 MG capsule TAKE ONE TO TWO CAPSULES BY MOUTH FOUR TIMES A DAY AS NEEDED FOR DIARRHEA (DO NOT TAKE MORE THAN 8 CAPSULES PER DAY). 120 capsule 1    MAGNESIUM OXIDE 400 (240 Mg) MG tablet TAKE 1 TABLET BY MOUTH DAILY 30 tablet 5    metoprolol succinate ER (TOPROL XL) 25 MG  24 hr tablet TAKE 1 TABLET BY MOUTH DAILY 30 tablet 3    Multiple Vitamin (DAILY-GLADIS MULTIVITAMIN) TABS TAKE 1 TABLET BY MOUTH ONCE DAILY 30 tablet 11    mycophenolate (GENERIC EQUIVALENT) 250 MG capsule TAKE TWO CAPSULES BY MOUTH EVERY 12 HOURS 120 capsule 11    NIFEdipine ER OSMOTIC (PROCARDIA XL) 60 MG 24 hr tablet TAKE 1 TABLET(60 MG) BY MOUTH TWICE DAILY 6 tablet 0    omega 3 1000 MG CAPS Take 2,000 mg by mouth 2 times daily. 120 capsule 11    ondansetron (ZOFRAN ODT) 8 MG ODT tab Take 1 tablet (8 mg) by mouth every 8 hours as needed for nausea 15 tablet 3    pantoprazole (PROTONIX) 40 MG EC tablet TAKE 1 TABLET BY MOUTH ONCE DAILY BEFORE BREAKFAST 90 tablet 3    PHOSPHORUS TABLET 250  MG per tablet TAKE 1 TABLET BY MOUTH 4 TIMES DAILY 120 tablet 1    prednisoLONE acetate (PRED FORTE) 1 % ophthalmic suspension       predniSONE (DELTASONE) 5 MG tablet TAKE ONE TABLET BY MOUTH ONCE DAILY 30 tablet 11    rosuvastatin (CRESTOR) 20 MG tablet Take 1 tablet (20 mg) by mouth daily. 30 tablet 11    sodium zirconium cyclosilicate (LOKELMA) 10 g PACK packet Take 10 gram 3x/day for 2 days then 10 grams daily. (Patient taking differently: Take 10 g by mouth daily. Take 10 gram 3x/day for 2 days then 10 grams daily.) 30 packet 11    SORAfenib (NEXAVAR) 200 MG tablet Take 1 tablet (200 mg) by mouth 2 times daily. On an empty stomach 1 hour before or 2 hours after a meal. 60 tablet 0    tamsulosin (FLOMAX) 0.4 MG capsule TAKE 1 CAPSULE BY MOUTH ONCE DAILY 30 capsule 11    triamcinolone (KENALOG) 0.1 % external cream Apply topically 2 times daily. 80 g 0    UltiCare Alcohol Swabs 70 % PADS 1 each by Other route 4 times daily 400 each 1    VITAMIN D3 50 MCG (2000 UT) tablet TAKE 1 TABLET BY MOUTH ONCE DAILY 30 tablet 8    Continuous Glucose Sensor (FREESTYLE CLAUDIA 2 SENSOR) [facility] 1 each See Admin Instructions Change every 14 days. 7 each 3    insulin aspart (NOVOLOG FLEXPEN) 100 UNIT/ML pen INJECT 5-10U SUBCUTANEOUSLY FOUR  TIMES A DAY ( WITH MEALS AND EVERY NIGHT ) ONE UNIT :8CARB BEFORE MEALS . ALSO ADD ONE UNIT : 50MG/DL 15 mL 11    NOVOLOG FLEXPEN 100 UNIT/ML soln Use as directed up to 50 units daily. 45 mL 3    SORAfenib (NEXAVAR) 200 MG tablet Take 1 tablet (200 mg) by mouth 2 times daily. On an empty stomach 1 hour before or 2 hours after a meal. 60 tablet 0       Physical Examination:  /63 (BP Location: Left arm, Patient Position: Sitting, Cuff Size: Adult Regular)   Pulse 98   Temp 97.7  F (36.5  C) (Oral)   Resp 16   Wt 78.5 kg (173 lb 1.6 oz)   SpO2 100%   BMI 25.55 kg/m    Wt Readings from Last 5 Encounters:   12/11/24 78.5 kg (173 lb 1.6 oz)   12/09/24 78.8 kg (173 lb 11.2 oz)   12/05/24 77.1 kg (170 lb)   11/18/24 79.4 kg (175 lb)   09/09/24 75.6 kg (166 lb 9.6 oz)     General: The patient is a pleasant male in no acute distress.  HEENT: EOMI, PERRL. Sclerae are anicteric. Oral mucosa is pink and moist with no lesions or thrush.   Lymph: Neck is supple with no lymphadenopathy in the cervical or supraclavicular areas.   Heart: Regular rate and rhythm.   Lungs: Clear to auscultation bilaterally.   Abdomen: Bowel sounds present, soft, nontender with no palpable hepatosplenomegaly or masses.   Extremities: No lower extremity edema noted bilaterally. Compression stockings in place.   Neuro: Cranial nerves II through XII are grossly intact.  Skin: No rashes, petechiae, or bruising noted on exposed skin.    Laboratory Data:  Most Recent 3 CBC's:  Recent Labs   Lab Test 12/11/24  1300 12/04/24  0930 10/23/24  1044   WBC 6.7 6.9 6.2   HGB 9.9* 10.6* 11.3*   MCV 96 98 95    181 152   ANEUTAUTO 5.7 5.2 4.8    Most Recent 3 BMP's:  Recent Labs   Lab Test 12/11/24  1300 12/04/24  0941 12/04/24  0930 10/23/24  1044     --  140 139   POTASSIUM 4.5  --  4.3 4.5   CHLORIDE 106  --  105 103   CO2 20*  --  22 23   BUN 49.1*  --  34.0* 24.7*   CR 1.90* 1.7* 1.58* 1.52*   ANIONGAP 16*  --  13 13   DEBORAH 7.7*  --  8.4*  7.5*   *  --  80 130*   PROTTOTAL 7.2  --  7.3 7.4   ALBUMIN 4.2  --  4.3 4.4    Most Recent 2 LFT's:  Recent Labs   Lab Test 12/11/24  1300 12/04/24  0930   AST 33 33   ALT 32 32   ALKPHOS 97 105   BILITOTAL 0.2 0.4    Most Recent TSH and T4:  Recent Labs   Lab Test 09/05/24  0958 01/24/20  0837 08/08/18  1246   TSH 1.32   < > 0.49   T4  --   --  1.21    < > = values in this interval not displayed.     Magnesium   Date Value Ref Range Status   12/11/2024 1.4 (L) 1.7 - 2.3 mg/dL Final   06/04/2021 2.2 1.6 - 2.3 mg/dL Final     I reviewed the above labs today.    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     CONTRAST: ISOVUE 370 88cc injected IV without oral contrast    CT scan radiation dose is optimized to minimum requisite dose using  automated dose modulation techniques.    FINDINGS:    Lungs/pleura: No pleural effusion or pneumothorax. Central airways are  patent. No focal consolidation. Stable 4 mm right apical pulmonary  nodule (9/34), previously 4 mm.    Mediastinum: Normal heart size. No pericardial effusion. Multivessel  calcified coronary plaque. Central pulmonary vasculature  is patent. No  mediastinal, axillary, or supraclavicular lymphadenopathy. Prominent  esophageal varices.    Liver: Postsurgical changes of liver transplantation with stable mild  soft tissue thickening along the jeff hepatis. The anterior aspect of  the left hepatic lobe protrudes slightly into a small midline hernia.   No hepatic mass. Unchanged subcentimeter hypodensity in the inferior  right hepatic lobe, too small to accurately characterize. Patent  hepatic vasculature. Unchanged appearance of the proximal right  hepatic artery.    Biliary System: Gallbladder is surgically absent. No extrahepatic  biliary dilation.    Pancreas: No pancreatic ductal dilation. Stable 9 mm hypodensity  within the pancreatic head (5/160), previously 9 mm when measured  similarly.    Adrenal glands: No mass or nodules    Spleen: Enlarged measuring 13.9 cm    Kidneys: No hydronephrosis. Stable 1.6 cm renal cortical hypodensity  (7/64), consistent with simple renal cysts. Adjacent subcentimeter  cortical hypodensities too small to accurately characterize but likely  represent additional cyst.    Gastrointestinal tract: Normal appendix. Normal caliber small and  large bowel..    Mesentery/peritoneum/retroperitoneum: No free fluid or air.  Few  scattered peritoneal nodules are not significantly changed compared to  prior examination. For example, there is a nodule adjacent to the  hepatic flexure of the colon measuring 9 mm (8/347), previously 10 mm,  a 6 mm nodule anterior to the descending colon (8/413), previously 6  mm, and a 5 mm nodule adjacent to a loop of small bowel in the left  lateral abdomen (8/346), previously 6 mm on 9/5/2024 and not  definitively visualized on 8/5/2024.    Lymph nodes: No lymphadenopathy.    Vasculature: Patent major abdominal vasculature.  Left upper  quadrant/esophageal varices.    Pelvis: Urinary bladder is normal.  Prostate and seminal vesicles are  within normal limits.    Osseous structures: No  aggressive or acute osseous lesion.  Prior  midline sternotomy without osseous fusion of the sternum. Multilevel  degenerative changes of the spine. Chronic wedging of the T11  vertebral body.      Soft tissues: Small wide necked substernal ventral hernia containing  fat and the anterior edge of the left hepatic lobe, similar compared  to prior.  Impression: IMPRESSION:   1. Postsurgical changes of liver transplant without evidence of  recurrent hepatocellular carcinoma.  2. Stable scattered peritoneal nodules.  3. Stable hypodense lesion within the pancreatic head, possibly  representing a sidebranch IPMN.  4. Stable 4 mm pulmonary nodule.    I have personally reviewed the examination and initial interpretation  and I agree with the findings.    RAMON SEAY DO         SYSTEM ID:  [id number]    I reviewed the above imaging report today.    Assessment and Plan:    Diarrhea.   Started 5 days ago after eating crab, which he has previously been intolerant to. Bowel movements are still watery, but slowing in frequency. Continues on 8 tablets of imodium daily. No abdominal pain or fever. Appetite and fluid intake are adequate. Vital signs are stable, blood pressure improved from earlier this week when it was soft in clinic. Creatinine elevated to 1.9 today (baseline ~1.3), magnesium low at 1.4.   -IL NS IV bolus today. Will replace magnesium with 2g IV and avoid oral replacement due to diarrhea.   -Stool cultures ordered, awaiting sample from patient.    -Recheck labs on 12/13.  -Continue imodium and pushing oral hydration. Instructed to notify triage if diarrhea does not continue to improve or with any symptoms or concerns.       HCC.   Recent repeat imaging with stable disease. Continues on sorafenib.  Scheduled for next follow up with Dr. Villarreal in March. Patient to call sooner with any concerns.         31 minutes spent on the date of the encounter doing chart review, review of test results, interpretation of tests,  patient visit, and documentation     SHANIQUE Dumont CNP      Again, thank you for allowing me to participate in the care of your patient.        Sincerely,        SHANIQUE Dumont CNP

## 2024-12-12 LAB
ADV 40+41 DNA STL QL NAA+NON-PROBE: NEGATIVE
ASTRO TYP 1-8 RNA STL QL NAA+NON-PROBE: NEGATIVE
C CAYETANENSIS DNA STL QL NAA+NON-PROBE: NEGATIVE
C DIFF TOX B STL QL: NEGATIVE
CAMPYLOBACTER DNA SPEC NAA+PROBE: POSITIVE
CRYPTOSP DNA STL QL NAA+NON-PROBE: NEGATIVE
E COLI O157 DNA STL QL NAA+NON-PROBE: ABNORMAL
E HISTOLYT DNA STL QL NAA+NON-PROBE: NEGATIVE
EAEC ASTA GENE ISLT QL NAA+PROBE: NEGATIVE
EC STX1+STX2 GENES STL QL NAA+NON-PROBE: NEGATIVE
EPEC EAE GENE STL QL NAA+NON-PROBE: NEGATIVE
ETEC LTA+ST1A+ST1B TOX ST NAA+NON-PROBE: NEGATIVE
G LAMBLIA DNA STL QL NAA+NON-PROBE: NEGATIVE
NOROVIRUS GI+II RNA STL QL NAA+NON-PROBE: NEGATIVE
P SHIGELLOIDES DNA STL QL NAA+NON-PROBE: NEGATIVE
RVA RNA STL QL NAA+NON-PROBE: NEGATIVE
SALMONELLA SP RPOD STL QL NAA+PROBE: NEGATIVE
SAPO I+II+IV+V RNA STL QL NAA+NON-PROBE: NEGATIVE
SHIGELLA SP+EIEC IPAH ST NAA+NON-PROBE: NEGATIVE
V CHOLERAE DNA SPEC QL NAA+PROBE: NEGATIVE
VIBRIO DNA SPEC NAA+PROBE: NEGATIVE
Y ENTEROCOL DNA STL QL NAA+PROBE: NEGATIVE

## 2024-12-12 PROCEDURE — 87493 C DIFF AMPLIFIED PROBE: CPT

## 2024-12-12 PROCEDURE — 87507 IADNA-DNA/RNA PROBE TQ 12-25: CPT

## 2024-12-12 PROCEDURE — 99000 SPECIMEN HANDLING OFFICE-LAB: CPT | Performed by: PATHOLOGY

## 2024-12-13 ENCOUNTER — LAB (OUTPATIENT)
Dept: LAB | Facility: CLINIC | Age: 59
End: 2024-12-13
Payer: MEDICARE

## 2024-12-13 DIAGNOSIS — N18.32 STAGE 3B CHRONIC KIDNEY DISEASE (H): ICD-10-CM

## 2024-12-13 DIAGNOSIS — N18.32 STAGE 3B CHRONIC KIDNEY DISEASE (CKD) (H): ICD-10-CM

## 2024-12-13 DIAGNOSIS — C22.0 HCC (HEPATOCELLULAR CARCINOMA) (H): ICD-10-CM

## 2024-12-13 DIAGNOSIS — C78.6 MALIGNANT NEOPLASM METASTATIC TO PERITONEUM (H): ICD-10-CM

## 2024-12-13 DIAGNOSIS — I82.452 ACUTE DEEP VEIN THROMBOSIS (DVT) OF LEFT PERONEAL VEIN (H): ICD-10-CM

## 2024-12-13 DIAGNOSIS — N18.32 ANEMIA OF CHRONIC RENAL FAILURE, STAGE 3B (H): ICD-10-CM

## 2024-12-13 DIAGNOSIS — Z94.4 LIVER TRANSPLANT RECIPIENT (H): ICD-10-CM

## 2024-12-13 DIAGNOSIS — D63.1 ANEMIA OF CHRONIC RENAL FAILURE, STAGE 3B (H): ICD-10-CM

## 2024-12-13 DIAGNOSIS — R19.7 DIARRHEA, UNSPECIFIED TYPE: ICD-10-CM

## 2024-12-13 DIAGNOSIS — E11.3293 TYPE 2 DIABETES MELLITUS WITH MILD NONPROLIFERATIVE RETINOPATHY OF BOTH EYES WITHOUT MACULAR EDEMA, UNSPECIFIED WHETHER LONG TERM INSULIN USE (H): ICD-10-CM

## 2024-12-13 DIAGNOSIS — R19.7 DIARRHEA: ICD-10-CM

## 2024-12-13 LAB
ALBUMIN MFR UR ELPH: 182 MG/DL
ALBUMIN SERPL BCG-MCNC: 4.2 G/DL (ref 3.5–5.2)
ALBUMIN UR-MCNC: 200 MG/DL
ALP SERPL-CCNC: 103 U/L (ref 40–150)
ALT SERPL W P-5'-P-CCNC: 32 U/L (ref 0–70)
ANION GAP SERPL CALCULATED.3IONS-SCNC: 13 MMOL/L (ref 7–15)
APPEARANCE UR: CLEAR
AST SERPL W P-5'-P-CCNC: 34 U/L (ref 0–45)
BASOPHILS # BLD AUTO: 0.1 10E3/UL (ref 0–0.2)
BASOPHILS NFR BLD AUTO: 1 %
BILIRUB SERPL-MCNC: 0.3 MG/DL
BILIRUB UR QL STRIP: NEGATIVE
BUN SERPL-MCNC: 31 MG/DL (ref 8–23)
CALCIUM SERPL-MCNC: 7.6 MG/DL (ref 8.8–10.4)
CHLORIDE SERPL-SCNC: 106 MMOL/L (ref 98–107)
COLOR UR AUTO: ABNORMAL
CREAT SERPL-MCNC: 1.65 MG/DL (ref 0.67–1.17)
CREAT UR-MCNC: 48.7 MG/DL
CREAT UR-MCNC: 49.8 MG/DL
EGFRCR SERPLBLD CKD-EPI 2021: 47 ML/MIN/1.73M2
EOSINOPHIL # BLD AUTO: 0.2 10E3/UL (ref 0–0.7)
EOSINOPHIL NFR BLD AUTO: 4 %
ERYTHROCYTE [DISTWIDTH] IN BLOOD BY AUTOMATED COUNT: 15.9 % (ref 10–15)
GLUCOSE SERPL-MCNC: 142 MG/DL (ref 70–99)
GLUCOSE UR STRIP-MCNC: >=1000 MG/DL
HCO3 SERPL-SCNC: 22 MMOL/L (ref 22–29)
HCT VFR BLD AUTO: 33.3 % (ref 40–53)
HGB BLD-MCNC: 10.2 G/DL (ref 13.3–17.7)
HGB UR QL STRIP: ABNORMAL
IMM GRANULOCYTES # BLD: 0 10E3/UL
IMM GRANULOCYTES NFR BLD: 1 %
KETONES UR STRIP-MCNC: NEGATIVE MG/DL
LEUKOCYTE ESTERASE UR QL STRIP: NEGATIVE
LYMPHOCYTES # BLD AUTO: 0.9 10E3/UL (ref 0.8–5.3)
LYMPHOCYTES NFR BLD AUTO: 16 %
MAGNESIUM SERPL-MCNC: 1.8 MG/DL (ref 1.7–2.3)
MCH RBC QN AUTO: 29.7 PG (ref 26.5–33)
MCHC RBC AUTO-ENTMCNC: 30.6 G/DL (ref 31.5–36.5)
MCV RBC AUTO: 97 FL (ref 78–100)
MICROALBUMIN UR-MCNC: 1102 MG/L
MICROALBUMIN/CREAT UR: 2212.85 MG/G CR (ref 0–17)
MONOCYTES # BLD AUTO: 0.4 10E3/UL (ref 0–1.3)
MONOCYTES NFR BLD AUTO: 7 %
NEUTROPHILS # BLD AUTO: 4.2 10E3/UL (ref 1.6–8.3)
NEUTROPHILS NFR BLD AUTO: 73 %
NITRATE UR QL: NEGATIVE
NRBC # BLD AUTO: 0 10E3/UL
NRBC BLD AUTO-RTO: 0 /100
PH UR STRIP: 5.5 [PH] (ref 5–7)
PHOSPHATE SERPL-MCNC: 3.8 MG/DL (ref 2.5–4.5)
PLATELET # BLD AUTO: 184 10E3/UL (ref 150–450)
POTASSIUM SERPL-SCNC: 4.9 MMOL/L (ref 3.4–5.3)
PROT SERPL-MCNC: 7.1 G/DL (ref 6.4–8.3)
PROT/CREAT 24H UR: 3.74 MG/MG CR (ref 0–0.2)
RBC # BLD AUTO: 3.43 10E6/UL (ref 4.4–5.9)
RBC URINE: <1 /HPF
SODIUM SERPL-SCNC: 141 MMOL/L (ref 135–145)
SP GR UR STRIP: 1.01 (ref 1–1.03)
SQUAMOUS EPITHELIAL: <1 /HPF
UROBILINOGEN UR STRIP-MCNC: NORMAL MG/DL
WBC # BLD AUTO: 5.8 10E3/UL (ref 4–11)
WBC URINE: 1 /HPF

## 2024-12-13 PROCEDURE — 84100 ASSAY OF PHOSPHORUS: CPT | Performed by: PATHOLOGY

## 2024-12-13 PROCEDURE — 82043 UR ALBUMIN QUANTITATIVE: CPT

## 2024-12-13 PROCEDURE — 80053 COMPREHEN METABOLIC PANEL: CPT | Performed by: PATHOLOGY

## 2024-12-13 PROCEDURE — 83735 ASSAY OF MAGNESIUM: CPT | Performed by: PATHOLOGY

## 2024-12-13 PROCEDURE — 99000 SPECIMEN HANDLING OFFICE-LAB: CPT | Performed by: PATHOLOGY

## 2024-12-13 PROCEDURE — 84156 ASSAY OF PROTEIN URINE: CPT | Performed by: PATHOLOGY

## 2024-12-13 PROCEDURE — 85025 COMPLETE CBC W/AUTO DIFF WBC: CPT | Performed by: PATHOLOGY

## 2024-12-13 PROCEDURE — 81001 URINALYSIS AUTO W/SCOPE: CPT | Performed by: PATHOLOGY

## 2024-12-13 PROCEDURE — 36415 COLL VENOUS BLD VENIPUNCTURE: CPT | Performed by: PATHOLOGY

## 2024-12-13 PROCEDURE — 82570 ASSAY OF URINE CREATININE: CPT

## 2024-12-16 ENCOUNTER — DOCUMENTATION ONLY (OUTPATIENT)
Dept: NEPHROLOGY | Facility: CLINIC | Age: 59
End: 2024-12-16
Payer: MEDICARE

## 2024-12-16 NOTE — PROGRESS NOTES
Labs completed on 12/13/24, no additional labs needed for appointment  Cyndi Naidu RN, BSN   Nephrology RN Care Coordinator   [facility]

## 2024-12-17 ENCOUNTER — TELEPHONE (OUTPATIENT)
Dept: ONCOLOGY | Facility: CLINIC | Age: 59
End: 2024-12-17
Payer: MEDICARE

## 2024-12-17 DIAGNOSIS — I82.452 ACUTE DEEP VEIN THROMBOSIS (DVT) OF LEFT PERONEAL VEIN (H): ICD-10-CM

## 2024-12-17 DIAGNOSIS — C78.6 MALIGNANT NEOPLASM METASTATIC TO PERITONEUM (H): ICD-10-CM

## 2024-12-17 DIAGNOSIS — N18.32 STAGE 3B CHRONIC KIDNEY DISEASE (H): ICD-10-CM

## 2024-12-17 DIAGNOSIS — N18.32 ANEMIA OF CHRONIC RENAL FAILURE, STAGE 3B (H): ICD-10-CM

## 2024-12-17 DIAGNOSIS — E11.3293 TYPE 2 DIABETES MELLITUS WITH MILD NONPROLIFERATIVE RETINOPATHY OF BOTH EYES WITHOUT MACULAR EDEMA, UNSPECIFIED WHETHER LONG TERM INSULIN USE (H): ICD-10-CM

## 2024-12-17 DIAGNOSIS — C22.0 HCC (HEPATOCELLULAR CARCINOMA) (H): ICD-10-CM

## 2024-12-17 DIAGNOSIS — Z94.4 LIVER TRANSPLANT RECIPIENT (H): ICD-10-CM

## 2024-12-17 DIAGNOSIS — D63.1 ANEMIA OF CHRONIC RENAL FAILURE, STAGE 3B (H): ICD-10-CM

## 2024-12-17 RX ORDER — LOPERAMIDE HYDROCHLORIDE 2 MG/1
2 CAPSULE ORAL 4 TIMES DAILY PRN
Qty: 120 CAPSULE | Refills: 3 | Status: SHIPPED | OUTPATIENT
Start: 2024-12-17

## 2024-12-17 NOTE — TELEPHONE ENCOUNTER
Medication requested: loperamide 2 mg capsule    Last prescribing provider: Miguel Villarreal MD on 8/30/24    Last clinic visit date: 12/11/24 with Alisha Breen CNP    Recommendations for requested medication (if none, N/A): NA    Any other pertinent information (if none, N/A): NA    Refilled: Y/N, if NO, why?    Pended and Routed to Alisha Breen CNP

## 2024-12-18 ENCOUNTER — OFFICE VISIT (OUTPATIENT)
Dept: NEPHROLOGY | Facility: CLINIC | Age: 59
End: 2024-12-18
Payer: MEDICARE

## 2024-12-18 ENCOUNTER — LAB (OUTPATIENT)
Dept: LAB | Facility: CLINIC | Age: 59
End: 2024-12-18
Payer: MEDICARE

## 2024-12-18 VITALS
DIASTOLIC BLOOD PRESSURE: 66 MMHG | TEMPERATURE: 98.9 F | SYSTOLIC BLOOD PRESSURE: 114 MMHG | OXYGEN SATURATION: 99 % | HEART RATE: 89 BPM | WEIGHT: 175.1 LBS | HEIGHT: 69 IN | BODY MASS INDEX: 25.93 KG/M2

## 2024-12-18 DIAGNOSIS — D63.1 ANEMIA OF CHRONIC RENAL FAILURE, STAGE 3B (H): ICD-10-CM

## 2024-12-18 DIAGNOSIS — Z79.4 TYPE 2 DIABETES MELLITUS WITH MICROALBUMINURIA, WITH LONG-TERM CURRENT USE OF INSULIN (H): ICD-10-CM

## 2024-12-18 DIAGNOSIS — N18.32 ANEMIA OF CHRONIC RENAL FAILURE, STAGE 3B (H): ICD-10-CM

## 2024-12-18 DIAGNOSIS — Z94.4 LIVER REPLACED BY TRANSPLANT (H): ICD-10-CM

## 2024-12-18 DIAGNOSIS — R60.9 EDEMA, UNSPECIFIED TYPE: ICD-10-CM

## 2024-12-18 DIAGNOSIS — E83.51 HYPOCALCEMIA: ICD-10-CM

## 2024-12-18 DIAGNOSIS — E11.29 TYPE 2 DIABETES MELLITUS WITH MICROALBUMINURIA, WITH LONG-TERM CURRENT USE OF INSULIN (H): ICD-10-CM

## 2024-12-18 DIAGNOSIS — N18.31 STAGE 3A CHRONIC KIDNEY DISEASE (H): Primary | ICD-10-CM

## 2024-12-18 DIAGNOSIS — R80.9 TYPE 2 DIABETES MELLITUS WITH MICROALBUMINURIA, WITH LONG-TERM CURRENT USE OF INSULIN (H): ICD-10-CM

## 2024-12-18 LAB
ALBUMIN SERPL BCG-MCNC: 4.1 G/DL (ref 3.5–5.2)
ALP SERPL-CCNC: 93 U/L (ref 40–150)
ALT SERPL W P-5'-P-CCNC: 26 U/L (ref 0–70)
ANION GAP SERPL CALCULATED.3IONS-SCNC: 14 MMOL/L (ref 7–15)
AST SERPL W P-5'-P-CCNC: 29 U/L (ref 0–45)
BILIRUB DIRECT SERPL-MCNC: <0.2 MG/DL (ref 0–0.3)
BILIRUB SERPL-MCNC: 0.3 MG/DL
BUN SERPL-MCNC: 37.2 MG/DL (ref 8–23)
CALCIUM SERPL-MCNC: 8.2 MG/DL (ref 8.8–10.4)
CHLORIDE SERPL-SCNC: 105 MMOL/L (ref 98–107)
CREAT SERPL-MCNC: 1.68 MG/DL (ref 0.67–1.17)
EGFRCR SERPLBLD CKD-EPI 2021: 46 ML/MIN/1.73M2
ERYTHROCYTE [DISTWIDTH] IN BLOOD BY AUTOMATED COUNT: 15.7 % (ref 10–15)
FERRITIN SERPL-MCNC: 817 NG/ML (ref 31–409)
GLUCOSE SERPL-MCNC: 215 MG/DL (ref 70–99)
HCO3 SERPL-SCNC: 21 MMOL/L (ref 22–29)
HCT VFR BLD AUTO: 30.4 % (ref 40–53)
HGB BLD-MCNC: 9.2 G/DL (ref 13.3–17.7)
IRON BINDING CAPACITY (ROCHE): 199 UG/DL (ref 240–430)
IRON SATN MFR SERPL: 29 % (ref 15–46)
IRON SERPL-MCNC: 58 UG/DL (ref 61–157)
MAGNESIUM SERPL-MCNC: 1.3 MG/DL (ref 1.7–2.3)
MCH RBC QN AUTO: 29.8 PG (ref 26.5–33)
MCHC RBC AUTO-ENTMCNC: 30.3 G/DL (ref 31.5–36.5)
MCV RBC AUTO: 98 FL (ref 78–100)
PLATELET # BLD AUTO: 151 10E3/UL (ref 150–450)
POTASSIUM SERPL-SCNC: 5.6 MMOL/L (ref 3.4–5.3)
PROT SERPL-MCNC: 7 G/DL (ref 6.4–8.3)
RBC # BLD AUTO: 3.09 10E6/UL (ref 4.4–5.9)
SODIUM SERPL-SCNC: 140 MMOL/L (ref 135–145)
WBC # BLD AUTO: 6.4 10E3/UL (ref 4–11)

## 2024-12-18 PROCEDURE — 85027 COMPLETE CBC AUTOMATED: CPT | Performed by: PATHOLOGY

## 2024-12-18 PROCEDURE — 80053 COMPREHEN METABOLIC PANEL: CPT | Performed by: PATHOLOGY

## 2024-12-18 PROCEDURE — 82728 ASSAY OF FERRITIN: CPT | Performed by: PATHOLOGY

## 2024-12-18 PROCEDURE — 83550 IRON BINDING TEST: CPT | Performed by: PATHOLOGY

## 2024-12-18 PROCEDURE — 82248 BILIRUBIN DIRECT: CPT | Performed by: PATHOLOGY

## 2024-12-18 PROCEDURE — G0463 HOSPITAL OUTPT CLINIC VISIT: HCPCS

## 2024-12-18 PROCEDURE — 83735 ASSAY OF MAGNESIUM: CPT | Performed by: PATHOLOGY

## 2024-12-18 PROCEDURE — 83540 ASSAY OF IRON: CPT | Performed by: PATHOLOGY

## 2024-12-18 PROCEDURE — 36415 COLL VENOUS BLD VENIPUNCTURE: CPT | Performed by: PATHOLOGY

## 2024-12-18 RX ORDER — FUROSEMIDE 20 MG/1
20 TABLET ORAL PRN
Qty: 90 TABLET | Refills: 3 | Status: SHIPPED | OUTPATIENT
Start: 2024-12-18 | End: 2025-12-18

## 2024-12-18 RX ORDER — CALCIUM CARBONATE 500 MG/1
1 TABLET, CHEWABLE ORAL 2 TIMES DAILY
Qty: 180 TABLET | Refills: 3 | Status: SHIPPED | OUTPATIENT
Start: 2024-12-18

## 2024-12-18 ASSESSMENT — PAIN SCALES - GENERAL: PAINLEVEL_OUTOF10: NO PAIN (0)

## 2024-12-18 NOTE — NURSING NOTE
"Chief Complaint   Patient presents with    RECHECK     6 month follow up.      Vitals:    12/18/24 1421 12/18/24 1424 12/18/24 1425 12/18/24 1426   BP: 123/65 111/68 108/65 114/66   BP Location: Right arm      Patient Position: Sitting      Cuff Size: Adult Regular      Pulse: 89      Temp: 98.9  F (37.2  C)      TempSrc: Oral      SpO2: 99%      Weight: 79.4 kg (175 lb 1.6 oz)      Height: 1.753 m (5' 9\")          BP Readings from Last 3 Encounters:   12/18/24 114/66   12/11/24 113/63   12/09/24 91/50       /66   Pulse 89   Temp 98.9  F (37.2  C) (Oral)   Ht 1.753 m (5' 9\")   Wt 79.4 kg (175 lb 1.6 oz)   SpO2 99%   BMI 25.86 kg/m       Eulalia Victoria    "

## 2024-12-18 NOTE — LETTER
12/18/2024       RE: Frandy Workman  [address] [facility] [address]     Dear Colleague,    Thank you for referring your patient, Frandy Workman, to the [facility] [place] at [facility]. Please see a copy of my visit note below.    Nephrology Clinic Visit 12/18/24    Assessment and Plan:    CKD3a w/proteinuria - Stable. Creat 1.6 eGFR 46, UPCR 3.7 mg/mgCr   - UPCR elevated since 6/24   - Baseline creat mid to upper 1's   - Etiology for his CKD felt to be DM, Lithium and CNI   - Sorafenib can result in Nephrotic syndrome but his serum albumin is normal   - Blood pressures controlled   - DM well controlled with A1c 5.6%   - On ACE, SGLT2i   - BPH controlled with Flomax   - On statin for CV risk reduction    2. CV/volume - Well controlled b/ps w/minimal edema. However, patient feels bloated in his chest/ankles. His diarrhea is resolving. Clinic b/ps 108-123/. HR 89. Weight 175#. Echo 11/24 with EF 55% and normal IVC.   Current regimen:     Lisinopril 10 mg every day    Toprol XL 25 mg every day    Nifedipine ER 60 mg bid    Jardiance 10 mg every day      - OK to add Lasix 20 mg every day for edema     - monitor home b/ps    3. Liver transplant IS: MMF/Pred    4. HCC w/peritoneal mets 2023 s/p HCC treated with TACE/microwave ablation and liver transplant 2019 - Being managed by Dr Villarreal and on Sorafenib.    - refer to Dr Villarreal's note 12/9/24    5. Electrolytes - Hyperkalemia despite being on Lokelma and having diarrhea, however, he is drinking large quantities of sports drinks.   K 5.6 Na 140    - Decrease sports drink consumption    - Continue Lokelma 10 g every day     - Repeat renal panel with next set of labs    6. Acid base - No acute concerns despite diarrhea. Bicarb 21    7. BMD - Ca 8.2 Phos 3.8 albumin 4.1   - Vit D 23 ( 12/24)   -  ( 12/23) repeat next set of labs   - Begin Ca 500 mg bid   - Continue Phos tab 250 mg  QID   - Continue D3 - 50 mcg every day    8. Anemia - Hgb 9.2   - Iron studies 12/24: Ferritin 817 Fe 58 IS 29   - Will defer to Oncology for IVELISSE     9. DM2 - Well controlled on Insulin and Jardiance. A1c 56%    10. Dispo - RTC 6/13/25 for follow up with labs prior    Assessment and plan was discussed with patient and he voiced his understanding and agreement.    Reason for Visit:  CKD3a    HPI:  Mr Workman is a 58 yo male with PMH significant for liver transplant (November, 2019) for ESTRADA cirrhosis and hepatocellular carcinoma (s/p TACE and microwave ablation 01/2019) with development of peritoneal mets in 2023 being treated with Sorafenib, long standing DM 2 (complicated by nonproliferative diabetic retinopathy, macular edema and peripheral neuropathy), bipolar disorder (previously on lithium for 15 years), HTN, hyperlipidemia, CAD s/p CABG 2021, BPH, PV thrombus, Anemia, GERD, HFrEF, ED, present today for routine CKD followup.   Last seen in clinic by Dr Rao 12/12/23  Baseline creat mid to upper 1's     ROS:   Major issue for Miller has been diarrhea assoc with his chemo regimen which was improved with lower dose Sorafenib, but then contracted a food poisoning recently with several weeks of diarrhea. The diarrhea generally occurs early in the morning, sometimes with multiple stools and then none the rest of the day. Overall this is resolving. He has required some IVF but is drinking fluids liberally. Up to 200 oz of fluid/day    Given all of this he feels like he is carrying extra fluid in his ankles and chest. No specific dyspnea. Weight up 5# from his baseline  No CP  No Voiding issues  Energy level is good  Appetite is good    Chronic Health Problems:    liver transplant (November, 2019) for ESTRADA cirrhosis and hepatocellular carcinoma (s/p TACE and microwave ablation 01/2019) with development of peritoneal mets in 2023 being treated with Sorafenib, long standing DM 2 (complicated by nonproliferative diabetic  retinopathy, macular edema and peripheral neuropathy), bipolar disorder (previously on lithium for 15 years), HTN, hyperlipidemia, CAD s/p CABG , BPH, PV thrombus, Anemia, GERD, HFrEF, ED    Family Hx:   Family History   Problem Relation Age of Onset     Skin Cancer Mother      Cancer Mother         mastectomy     Diabetes Mother          3/2016     Cerebrovascular Disease Mother         Passed away in Feb of this year, 80 years old.     Thyroid Disease Mother      Depression Mother      Breast Cancer Mother      Obesity Mother         280 pounds  from this and complications from D     Pancreatic Cancer Father 60     Prostate Cancer Father         Currently in Remission     Macular Degeneration Father      Glaucoma Father      Skin Cancer Father      Coronary Artery Disease Father         Started in his 70's  at 88 in [place]      Colon Cancer Father      Thyroid Disease Sister      Depression Sister      Asthma Sister      Sleep Apnea Brother      Colorectal Cancer Maternal Grandmother      Cancer Maternal Grandmother      Substance Abuse Maternal Grandmother         Alcohol     Prostate Cancer Maternal Grandfather      Substance Abuse Maternal Grandfather         Alcohol     Colorectal Cancer Paternal Grandmother      Asthma Sister         Had since birth     Liver Disease No family hx of      Melanoma No family hx of      Anesthesia Reaction No family hx of      Deep Vein Thrombosis (DVT) No family hx of      Personal Hx:    for 15 years  Social History     Tobacco Use     Smoking status: Former     Types: Dip, chew, snus or snuff     Passive exposure: Past     Smokeless tobacco: Former     Types: Chew     Quit date: 10/31/2017     Tobacco comments:     Quit [place] after Doctor told me in 2017 that if I didn't I would   Substance Use Topics     Alcohol use: No     Comment: quit 1996       Allergies:  Allergies   Allergen Reactions     Codeine Other (See Comments)      Cannot take due to liver  Cannot tolerate oral narcotics     Seasonal Allergies      Sneezing, coughing, runny and itchy eyes       Medications:  Current Outpatient Medications   Medication Sig Dispense Refill     acetaminophen (TYLENOL) 325 MG tablet TAKE 1 TABLET BY MOUTH EVERY SIX HOURS AS NEEDED FOR MILD PAIN 100 tablet 3     albuterol (PROAIR HFA/PROVENTIL HFA/VENTOLIN HFA) 108 (90 Base) MCG/ACT inhaler Inhale 2 puffs into the lungs every 4 hours as needed for shortness of breath, wheezing or cough. 18 g 3     ammonium lactate (AMLACTIN) 12 % external cream APPLY TOPICALLY DAILY AS NEEDED FOR DRY SKIN (ON FEET) 140 g 5     benzoyl peroxide 5 % external liquid Use topically in showers as a body wash 226 g 9     blood glucose (NO BRAND SPECIFIED) lancets standard Use to test blood sugar 1 times daily or as directed. 100 each 11     calcium carbonate (TUMS) 500 MG chewable tablet Take 1 tablet (500 mg) by mouth 2 times daily. 180 tablet 3     Continuous Glucose  (FREESTYLE CLAUDIA 3 READER) CECILIO 1 each See Admin Instructions. use to read blood sugar per  instructions 1 each 0     Continuous Glucose Sensor (FREESTYLE CLAUDIA 3 PLUS SENSOR) MISC Change every 15 days. 6 each 1     diphenoxylate-atropine (LOMOTIL) 2.5-0.025 MG tablet Take 1 tablet by mouth 4 times daily as needed for diarrhea. 60 tablet 0     ELIQUIS ANTICOAGULANT 5 MG tablet TAKE 1 TABLET BY MOUTH TWICE A DAY 60 tablet 3     empagliflozin (JARDIANCE) 10 MG TABS tablet Take 1 tablet (10 mg) by mouth daily 30 tablet 11     furosemide (LASIX) 20 MG tablet Take 1 tablet (20 mg) by mouth as needed (for edema). 90 tablet 3     insulin degludec (TRESIBA FLEXTOUCH) 100 UNIT/ML pen INJECT 14 UNITS SUBCUTANEOUSLY ONCE DAILY 15 mL 3     insulin pen needle (ULTICARE MICRO) 32G X 4 MM miscellaneous USE 5 PER  each 3     ketoconazole (NIZORAL) 2 % external shampoo Apply thin layer topically to scalp in shower (leave on 5 min prior to rinse);  may also use as a body wash 120 mL 11     lamiVUDine (EPIVIR) 100 MG tablet TAKE 1 TABLET BY MOUTH ONCE DAILY 90 tablet 2     lisinopril (ZESTRIL) 10 MG tablet Take 1 tablet (10 mg) by mouth daily. 30 tablet 11     loperamide (IMODIUM) 2 MG capsule TAKE 1 CAPSULE (2 MG) BY MOUTH 4 TIMES DAILY AS NEEDED FOR DIARRHEA 120 capsule 3     MAGNESIUM OXIDE 400 (240 Mg) MG tablet TAKE 1 TABLET BY MOUTH DAILY 30 tablet 5     metoprolol succinate ER (TOPROL XL) 25 MG 24 hr tablet TAKE 1 TABLET BY MOUTH DAILY 30 tablet 3     Multiple Vitamin (DAILY-GLADIS MULTIVITAMIN) TABS TAKE 1 TABLET BY MOUTH ONCE DAILY 30 tablet 11     mycophenolate (GENERIC EQUIVALENT) 250 MG capsule TAKE TWO CAPSULES BY MOUTH EVERY 12 HOURS 120 capsule 11     NIFEdipine ER OSMOTIC (PROCARDIA XL) 60 MG 24 hr tablet TAKE 1 TABLET(60 MG) BY MOUTH TWICE DAILY 6 tablet 0     omega 3 1000 MG CAPS Take 2,000 mg by mouth 2 times daily. 120 capsule 11     ondansetron (ZOFRAN ODT) 8 MG ODT tab Take 1 tablet (8 mg) by mouth every 8 hours as needed for nausea 15 tablet 3     pantoprazole (PROTONIX) 40 MG EC tablet TAKE 1 TABLET BY MOUTH ONCE DAILY BEFORE BREAKFAST 90 tablet 3     PHOSPHORUS TABLET 250  MG per tablet TAKE 1 TABLET BY MOUTH 4 TIMES DAILY 120 tablet 1     prednisoLONE acetate (PRED FORTE) 1 % ophthalmic suspension        predniSONE (DELTASONE) 5 MG tablet TAKE ONE TABLET BY MOUTH ONCE DAILY 30 tablet 11     rosuvastatin (CRESTOR) 20 MG tablet Take 1 tablet (20 mg) by mouth daily. 30 tablet 11     sodium zirconium cyclosilicate (LOKELMA) 10 g PACK packet Take 10 gram 3x/day for 2 days then 10 grams daily. 30 packet 11     SORAfenib (NEXAVAR) 200 MG tablet Take 1 tablet (200 mg) by mouth 2 times daily. On an empty stomach 1 hour before or 2 hours after a meal. 60 tablet 0     tamsulosin (FLOMAX) 0.4 MG capsule TAKE 1 CAPSULE BY MOUTH ONCE DAILY 30 capsule 11     triamcinolone (KENALOG) 0.1 % external cream Apply topically 2 times daily. 80 g 0      "UltiCare Alcohol Swabs 70 % PADS 1 each by Other route 4 times daily 400 each 1     VITAMIN D3 50 MCG (2000 UT) tablet TAKE 1 TABLET BY MOUTH ONCE DAILY 30 tablet 8     No current facility-administered medications for this visit.      Vitals:  /66   Pulse 89   Temp 98.9  F (37.2  C) (Oral)   Ht 1.753 m (5' 9\")   Wt 79.4 kg (175 lb 1.6 oz)   SpO2 99%   BMI 25.86 kg/m      Exam:  GENERAL APPEARANCE: alert and no distress  RESP: lungs clear to auscultation   CV: regular rhythm, normal rate  EDEMA: trace ankle edema bilaterally  ABDOMEN: soft, NT  MS: extremities normal - no gross deformities noted  NEURO: A/O    LABS:   CMP  Recent Labs   Lab Test 12/18/24  1336 12/13/24  1007 12/11/24  1300 12/04/24  0941 12/04/24  0930 07/12/21  1036 06/28/21  1347 06/14/21  1322 06/04/21  1236 05/05/21  0909    141 142  --  140   < > 137 139 138 139   POTASSIUM 5.6* 4.9 4.5  --  4.3   < > 4.6 4.7 4.6 4.5   CHLORIDE 105 106 106  --  105   < > 107 106 106 107   CO2 21* 22 20*  --  22   < > 25 26 26 26   ANIONGAP 14 13 16*  --  13   < > 5 7 6 6   * 142* 119*  --  80   < > 208* 173* 156* 258*   BUN 37.2* 31.0* 49.1*  --  34.0*   < > 33* 29 39* 38*   CR 1.68* 1.65* 1.90* 1.7* 1.58*   < > 1.62* 1.54* 1.64* 1.52*   GFRESTIMATED 46* 47* 40* 46* 50*   < > 46* 49* 46* 50*   GFRESTBLACK  --   --   --   --   --   --  54* 57* 53* 58*   DEBORAH 8.2* 7.6* 7.7*  --  8.4*   < > 9.1 9.8 10.2* 9.6    < > = values in this interval not displayed.     Recent Labs   Lab Test 12/18/24  1336 12/13/24  1007 12/11/24  1300 12/04/24  0930   BILITOTAL 0.3 0.3 0.2 0.4   ALKPHOS 93 103 97 105   ALT 26 32 32 32   AST 29 34 33 33     CBC  Recent Labs   Lab Test 12/18/24  1336 12/13/24  1007 12/11/24  1300 12/04/24  0930   HGB 9.2* 10.2* 9.9* 10.6*   WBC 6.4 5.8 6.7 6.9   RBC 3.09* 3.43* 3.29* 3.59*   HCT 30.4* 33.3* 31.7* 35.0*   MCV 98 97 96 98   MCH 29.8 29.7 30.1 29.5   MCHC 30.3* 30.6* 31.2* 30.3*   RDW 15.7* 15.9* 15.9* 16.1*    184 " 169 181     URINE STUDIES  Recent Labs   Lab Test 12/13/24  1008 12/04/24  0934 06/04/24  1046 08/11/23  0043   COLOR Light Yellow Light Yellow Straw Light Yellow   APPEARANCE Clear Clear Clear Clear   URINEGLC >=1000* 1000* >=1000* 300*   URINEBILI Negative Negative Negative Negative   URINEKETONE Negative Negative Negative Negative   SG 1.013 1.013 1.005 1.009   UBLD Trace* Small* Trace* Small*   URINEPH 5.5 6.0 5.5 5.0   PROTEIN 200* 30* 20* 30*   NITRITE Negative Negative Negative Negative   LEUKEST Negative Negative Negative Negative   RBCU <1 1 1 0   WBCU 1 2 3 3     Recent Labs   Lab Test 04/20/22  0919 10/04/21  0804 09/07/21  1115 02/26/21  0750   UTPG 0.12 1.46* 1.17* 0.47*     PTH  Recent Labs   Lab Test 12/12/23  0941 03/15/23  0906 04/20/22  0912   PTHI 302* 78* 59     IRON STUDIES  Recent Labs   Lab Test 12/18/24  1336 12/12/23  0941 04/25/22  0925   IRON 58* 43* 119   * 198* 281   IRONSAT 29 22 42   QUAN 817* 967* 508*       Yakelin Soares NP        Again, thank you for allowing me to participate in the care of your patient.      Sincerely,    Yakelin Soares NP

## 2024-12-19 NOTE — PROGRESS NOTES
Nephrology Clinic Visit 12/18/24    Assessment and Plan:    CKD3a w/proteinuria - Stable. Creat 1.6 eGFR 46, UPCR 3.7 mg/mgCr   - UPCR elevated since 6/24   - Baseline creat mid to upper 1's   - Etiology for his CKD felt to be DM, Lithium and CNI   - Sorafenib can result in Nephrotic syndrome but his serum albumin is normal   - Blood pressures controlled   - DM well controlled with A1c 5.6%   - On ACE, SGLT2i   - BPH controlled with Flomax   - On statin for CV risk reduction    2. CV/volume - Well controlled b/ps w/minimal edema. However, patient feels bloated in his chest/ankles. His diarrhea is resolving. Clinic b/ps 108-123/. HR 89. Weight 175#. Echo 11/24 with EF 55% and normal IVC.   Current regimen:     Lisinopril 10 mg every day    Toprol XL 25 mg every day    Nifedipine ER 60 mg bid    Jardiance 10 mg every day      - OK to add Lasix 20 mg every day for edema     - monitor home b/ps    3. Liver transplant IS: MMF/Pred    4. HCC w/peritoneal mets 2023 s/p HCC treated with TACE/microwave ablation and liver transplant 2019 - Being managed by Dr Villarreal and on Sorafenib.    - refer to Dr Villarreal's note 12/9/24    5. Electrolytes - Hyperkalemia despite being on Lokelma and having diarrhea, however, he is drinking large quantities of sports drinks.   K 5.6 Na 140    - Decrease sports drink consumption    - Continue Lokelma 10 g every day     - Repeat renal panel with next set of labs    6. Acid base - No acute concerns despite diarrhea. Bicarb 21    7. BMD - Ca 8.2 Phos 3.8 albumin 4.1   - Vit D 23 ( 12/24)   -  ( 12/23) repeat next set of labs   - Begin Ca 500 mg bid   - Continue Phos tab 250 mg QID   - Continue D3 - 50 mcg every day    8. Anemia - Hgb 9.2   - Iron studies 12/24: Ferritin 817 Fe 58 IS 29   - Will defer to Oncology for IVELISSE     9. DM2 - Well controlled on Insulin and Jardiance. A1c 56%    10. Dispo - RTC 6/13/25 for follow up with labs prior    Assessment and plan was discussed with patient  and he voiced his understanding and agreement.    Reason for Visit:  CKD3a    HPI:  Mr Workman is a 60 yo male with PMH significant for liver transplant (November, 2019) for ESTRADA cirrhosis and hepatocellular carcinoma (s/p TACE and microwave ablation 01/2019) with development of peritoneal mets in 2023 being treated with Sorafenib, long standing DM 2 (complicated by nonproliferative diabetic retinopathy, macular edema and peripheral neuropathy), bipolar disorder (previously on lithium for 15 years), HTN, hyperlipidemia, CAD s/p CABG 2021, BPH, PV thrombus, Anemia, GERD, HFrEF, ED, present today for routine CKD followup.   Last seen in clinic by Dr Rao 12/12/23  Baseline creat mid to upper 1's     ROS:   Major issue for Miller has been diarrhea assoc with his chemo regimen which was improved with lower dose Sorafenib, but then contracted a food poisoning recently with several weeks of diarrhea. The diarrhea generally occurs early in the morning, sometimes with multiple stools and then none the rest of the day. Overall this is resolving. He has required some IVF but is drinking fluids liberally. Up to 200 oz of fluid/day    Given all of this he feels like he is carrying extra fluid in his ankles and chest. No specific dyspnea. Weight up 5# from his baseline  No CP  No Voiding issues  Energy level is good  Appetite is good    Chronic Health Problems:    liver transplant (November, 2019) for ESTRADA cirrhosis and hepatocellular carcinoma (s/p TACE and microwave ablation 01/2019) with development of peritoneal mets in 2023 being treated with Sorafenib, long standing DM 2 (complicated by nonproliferative diabetic retinopathy, macular edema and peripheral neuropathy), bipolar disorder (previously on lithium for 15 years), HTN, hyperlipidemia, CAD s/p CABG 2021, BPH, PV thrombus, Anemia, GERD, HFrEF, ED    Family Hx:   Family History   Problem Relation Age of Onset    Skin Cancer Mother     Cancer Mother         mastectomy     Diabetes Mother          3/2016    Cerebrovascular Disease Mother         Passed away in Feb of this year, 80 years old.    Thyroid Disease Mother     Depression Mother     Breast Cancer Mother     Obesity Mother         280 pounds  from this and complications from D    Pancreatic Cancer Father 60    Prostate Cancer Father         Currently in Remission    Macular Degeneration Father     Glaucoma Father     Skin Cancer Father     Coronary Artery Disease Father         Started in his 70's  at 88 in [place]     Colon Cancer Father     Thyroid Disease Sister     Depression Sister     Asthma Sister     Sleep Apnea Brother     Colorectal Cancer Maternal Grandmother     Cancer Maternal Grandmother     Substance Abuse Maternal Grandmother         Alcohol    Prostate Cancer Maternal Grandfather     Substance Abuse Maternal Grandfather         Alcohol    Colorectal Cancer Paternal Grandmother     Asthma Sister         Had since birth    Liver Disease No family hx of     Melanoma No family hx of     Anesthesia Reaction No family hx of     Deep Vein Thrombosis (DVT) No family hx of      Personal Hx:    for 15 years  Social History     Tobacco Use    Smoking status: Former     Types: Dip, chew, snus or snuff     Passive exposure: Past    Smokeless tobacco: Former     Types: Chew     Quit date: 10/31/2017    Tobacco comments:     Quit [place] after Doctor told me in 2017 that if I didn't I would   Substance Use Topics    Alcohol use: No     Comment: quit 1996       Allergies:  Allergies   Allergen Reactions    Codeine Other (See Comments)     Cannot take due to liver  Cannot tolerate oral narcotics    Seasonal Allergies      Sneezing, coughing, runny and itchy eyes       Medications:  Current Outpatient Medications   Medication Sig Dispense Refill    acetaminophen (TYLENOL) 325 MG tablet TAKE 1 TABLET BY MOUTH EVERY SIX HOURS AS NEEDED FOR MILD PAIN 100 tablet 3    albuterol (PROAIR  HFA/PROVENTIL HFA/VENTOLIN HFA) 108 (90 Base) MCG/ACT inhaler Inhale 2 puffs into the lungs every 4 hours as needed for shortness of breath, wheezing or cough. 18 g 3    ammonium lactate (AMLACTIN) 12 % external cream APPLY TOPICALLY DAILY AS NEEDED FOR DRY SKIN (ON FEET) 140 g 5    benzoyl peroxide 5 % external liquid Use topically in showers as a body wash 226 g 9    blood glucose (NO BRAND SPECIFIED) lancets standard Use to test blood sugar 1 times daily or as directed. 100 each 11    calcium carbonate (TUMS) 500 MG chewable tablet Take 1 tablet (500 mg) by mouth 2 times daily. 180 tablet 3    Continuous Glucose  (FREESTYLE CLAUDIA 3 READER) CECILIO 1 each See Admin Instructions. use to read blood sugar per  instructions 1 each 0    Continuous Glucose Sensor (FREESTYLE CLAUDIA 3 PLUS SENSOR) MISC Change every 15 days. 6 each 1    diphenoxylate-atropine (LOMOTIL) 2.5-0.025 MG tablet Take 1 tablet by mouth 4 times daily as needed for diarrhea. 60 tablet 0    ELIQUIS ANTICOAGULANT 5 MG tablet TAKE 1 TABLET BY MOUTH TWICE A DAY 60 tablet 3    empagliflozin (JARDIANCE) 10 MG TABS tablet Take 1 tablet (10 mg) by mouth daily 30 tablet 11    furosemide (LASIX) 20 MG tablet Take 1 tablet (20 mg) by mouth as needed (for edema). 90 tablet 3    insulin degludec (TRESIBA FLEXTOUCH) 100 UNIT/ML pen INJECT 14 UNITS SUBCUTANEOUSLY ONCE DAILY 15 mL 3    insulin pen needle (ULTICARE MICRO) 32G X 4 MM miscellaneous USE 5 PER  each 3    ketoconazole (NIZORAL) 2 % external shampoo Apply thin layer topically to scalp in shower (leave on 5 min prior to rinse); may also use as a body wash 120 mL 11    lamiVUDine (EPIVIR) 100 MG tablet TAKE 1 TABLET BY MOUTH ONCE DAILY 90 tablet 2    lisinopril (ZESTRIL) 10 MG tablet Take 1 tablet (10 mg) by mouth daily. 30 tablet 11    loperamide (IMODIUM) 2 MG capsule TAKE 1 CAPSULE (2 MG) BY MOUTH 4 TIMES DAILY AS NEEDED FOR DIARRHEA 120 capsule 3    MAGNESIUM OXIDE 400 (240 Mg) MG  "tablet TAKE 1 TABLET BY MOUTH DAILY 30 tablet 5    metoprolol succinate ER (TOPROL XL) 25 MG 24 hr tablet TAKE 1 TABLET BY MOUTH DAILY 30 tablet 3    Multiple Vitamin (DAILY-GLADIS MULTIVITAMIN) TABS TAKE 1 TABLET BY MOUTH ONCE DAILY 30 tablet 11    mycophenolate (GENERIC EQUIVALENT) 250 MG capsule TAKE TWO CAPSULES BY MOUTH EVERY 12 HOURS 120 capsule 11    NIFEdipine ER OSMOTIC (PROCARDIA XL) 60 MG 24 hr tablet TAKE 1 TABLET(60 MG) BY MOUTH TWICE DAILY 6 tablet 0    omega 3 1000 MG CAPS Take 2,000 mg by mouth 2 times daily. 120 capsule 11    ondansetron (ZOFRAN ODT) 8 MG ODT tab Take 1 tablet (8 mg) by mouth every 8 hours as needed for nausea 15 tablet 3    pantoprazole (PROTONIX) 40 MG EC tablet TAKE 1 TABLET BY MOUTH ONCE DAILY BEFORE BREAKFAST 90 tablet 3    PHOSPHORUS TABLET 250  MG per tablet TAKE 1 TABLET BY MOUTH 4 TIMES DAILY 120 tablet 1    prednisoLONE acetate (PRED FORTE) 1 % ophthalmic suspension       predniSONE (DELTASONE) 5 MG tablet TAKE ONE TABLET BY MOUTH ONCE DAILY 30 tablet 11    rosuvastatin (CRESTOR) 20 MG tablet Take 1 tablet (20 mg) by mouth daily. 30 tablet 11    sodium zirconium cyclosilicate (LOKELMA) 10 g PACK packet Take 10 gram 3x/day for 2 days then 10 grams daily. 30 packet 11    SORAfenib (NEXAVAR) 200 MG tablet Take 1 tablet (200 mg) by mouth 2 times daily. On an empty stomach 1 hour before or 2 hours after a meal. 60 tablet 0    tamsulosin (FLOMAX) 0.4 MG capsule TAKE 1 CAPSULE BY MOUTH ONCE DAILY 30 capsule 11    triamcinolone (KENALOG) 0.1 % external cream Apply topically 2 times daily. 80 g 0    UltiCare Alcohol Swabs 70 % PADS 1 each by Other route 4 times daily 400 each 1    VITAMIN D3 50 MCG (2000 UT) tablet TAKE 1 TABLET BY MOUTH ONCE DAILY 30 tablet 8     No current facility-administered medications for this visit.      Vitals:  /66   Pulse 89   Temp 98.9  F (37.2  C) (Oral)   Ht 1.753 m (5' 9\")   Wt 79.4 kg (175 lb 1.6 oz)   SpO2 99%   BMI 25.86 kg/m  "     Exam:  GENERAL APPEARANCE: alert and no distress  RESP: lungs clear to auscultation   CV: regular rhythm, normal rate  EDEMA: trace ankle edema bilaterally  ABDOMEN: soft, NT  MS: extremities normal - no gross deformities noted  NEURO: A/O    LABS:   CMP  Recent Labs   Lab Test 12/18/24  1336 12/13/24 1007 12/11/24  1300 12/04/24  0941 12/04/24  0930 07/12/21  1036 06/28/21  1347 06/14/21  1322 06/04/21  1236 05/05/21  0909    141 142  --  140   < > 137 139 138 139   POTASSIUM 5.6* 4.9 4.5  --  4.3   < > 4.6 4.7 4.6 4.5   CHLORIDE 105 106 106  --  105   < > 107 106 106 107   CO2 21* 22 20*  --  22   < > 25 26 26 26   ANIONGAP 14 13 16*  --  13   < > 5 7 6 6   * 142* 119*  --  80   < > 208* 173* 156* 258*   BUN 37.2* 31.0* 49.1*  --  34.0*   < > 33* 29 39* 38*   CR 1.68* 1.65* 1.90* 1.7* 1.58*   < > 1.62* 1.54* 1.64* 1.52*   GFRESTIMATED 46* 47* 40* 46* 50*   < > 46* 49* 46* 50*   GFRESTBLACK  --   --   --   --   --   --  54* 57* 53* 58*   DEBORAH 8.2* 7.6* 7.7*  --  8.4*   < > 9.1 9.8 10.2* 9.6    < > = values in this interval not displayed.     Recent Labs   Lab Test 12/18/24 1336 12/13/24  1007 12/11/24  1300 12/04/24  0930   BILITOTAL 0.3 0.3 0.2 0.4   ALKPHOS 93 103 97 105   ALT 26 32 32 32   AST 29 34 33 33     CBC  Recent Labs   Lab Test 12/18/24  1336 12/13/24  1007 12/11/24  1300 12/04/24  0930   HGB 9.2* 10.2* 9.9* 10.6*   WBC 6.4 5.8 6.7 6.9   RBC 3.09* 3.43* 3.29* 3.59*   HCT 30.4* 33.3* 31.7* 35.0*   MCV 98 97 96 98   MCH 29.8 29.7 30.1 29.5   MCHC 30.3* 30.6* 31.2* 30.3*   RDW 15.7* 15.9* 15.9* 16.1*    184 169 181     URINE STUDIES  Recent Labs   Lab Test 12/13/24  1008 12/04/24  0934 06/04/24  1046 08/11/23  0043   COLOR Light Yellow Light Yellow Straw Light Yellow   APPEARANCE Clear Clear Clear Clear   URINEGLC >=1000* 1000* >=1000* 300*   URINEBILI Negative Negative Negative Negative   URINEKETONE Negative Negative Negative Negative   SG 1.013 1.013 1.005 1.009   UBLD Trace*  Small* Trace* Small*   URINEPH 5.5 6.0 5.5 5.0   PROTEIN 200* 30* 20* 30*   NITRITE Negative Negative Negative Negative   LEUKEST Negative Negative Negative Negative   RBCU <1 1 1 0   WBCU 1 2 3 3     Recent Labs   Lab Test 04/20/22  0919 10/04/21  0804 09/07/21  1115 02/26/21  0750   UTPG 0.12 1.46* 1.17* 0.47*     PTH  Recent Labs   Lab Test 12/12/23  0941 03/15/23  0906 04/20/22  0912   PTHI 302* 78* 59     IRON STUDIES  Recent Labs   Lab Test 12/18/24  1336 12/12/23  0941 04/25/22  0925   IRON 58* 43* 119   * 198* 281   IRONSAT 29 22 42   QUAN 817* 967* 508*       Yakelin Soares, NP

## 2024-12-23 LAB
ADV 40+41 DNA STL QL NAA+NON-PROBE: NEGATIVE
ASTRO TYP 1-8 RNA STL QL NAA+NON-PROBE: NEGATIVE
C CAYETANENSIS DNA STL QL NAA+NON-PROBE: NEGATIVE
CAMPYLOBACTER DNA SPEC NAA+PROBE: POSITIVE
CRYPTOSP DNA STL QL NAA+NON-PROBE: NEGATIVE
E COLI O157 DNA STL QL NAA+NON-PROBE: ABNORMAL
E HISTOLYT DNA STL QL NAA+NON-PROBE: NEGATIVE
EAEC ASTA GENE ISLT QL NAA+PROBE: NEGATIVE
EC STX1+STX2 GENES STL QL NAA+NON-PROBE: NEGATIVE
EPEC EAE GENE STL QL NAA+NON-PROBE: NEGATIVE
ETEC LTA+ST1A+ST1B TOX ST NAA+NON-PROBE: NEGATIVE
G LAMBLIA DNA STL QL NAA+NON-PROBE: NEGATIVE
NOROVIRUS GI+II RNA STL QL NAA+NON-PROBE: NEGATIVE
P SHIGELLOIDES DNA STL QL NAA+NON-PROBE: NEGATIVE
RVA RNA STL QL NAA+NON-PROBE: NEGATIVE
SALMONELLA SP RPOD STL QL NAA+PROBE: NEGATIVE
SAPO I+II+IV+V RNA STL QL NAA+NON-PROBE: NEGATIVE
SHIGELLA SP+EIEC IPAH ST NAA+NON-PROBE: NEGATIVE
V CHOLERAE DNA SPEC QL NAA+PROBE: NEGATIVE
VIBRIO DNA SPEC NAA+PROBE: NEGATIVE
Y ENTEROCOL DNA STL QL NAA+PROBE: NEGATIVE

## 2024-12-28 RX ORDER — KETOROLAC TROMETHAMINE 30 MG/ML
1 INJECTION, SOLUTION INTRAMUSCULAR; INTRAVENOUS CONTINUOUS
Qty: 1 EACH | Refills: 0 | Status: SHIPPED | OUTPATIENT
Start: 2024-12-28

## 2024-12-30 ENCOUNTER — TELEPHONE (OUTPATIENT)
Dept: ONCOLOGY | Facility: CLINIC | Age: 59
End: 2024-12-30
Payer: MEDICARE

## 2024-12-30 DIAGNOSIS — C22.0 HCC (HEPATOCELLULAR CARCINOMA) (H): Primary | ICD-10-CM

## 2024-12-30 DIAGNOSIS — C78.6 MALIGNANT NEOPLASM METASTATIC TO PERITONEUM (H): ICD-10-CM

## 2024-12-30 RX ORDER — SORAFENIB 200 MG/1
200 TABLET, FILM COATED ORAL 2 TIMES DAILY
Qty: 60 TABLET | Refills: 0 | Status: SHIPPED | OUTPATIENT
Start: 2025-01-08

## 2024-12-30 NOTE — ORAL ONC MGMT
Left message for Miller to call back.    Pharmacy team was requested to follow up and confirm diarrhea was back to baseline. 12/19 Mychai reports it was considerably better but wanted to confirm that continues to be the case.    Courtney Panda, PharmD, [facility]  Oral Chemotherapy Monitoring Program  [facility]  [phone]

## 2024-12-31 DIAGNOSIS — N18.31 CHRONIC KIDNEY DISEASE, STAGE 3A (H): ICD-10-CM

## 2024-12-31 DIAGNOSIS — I12.9 HYPERTENSION, RENAL: ICD-10-CM

## 2024-12-31 RX ORDER — NIFEDIPINE 60 MG/1
60 TABLET, EXTENDED RELEASE ORAL 2 TIMES DAILY
Qty: 60 TABLET | Refills: 3 | Status: SHIPPED | OUTPATIENT
Start: 2024-12-31

## 2025-01-05 RX ORDER — MULTIVITAMIN WITH FOLIC ACID 400 MCG
1 TABLET ORAL DAILY
Qty: 30 TABLET | Refills: 11 | Status: SHIPPED | OUTPATIENT
Start: 2025-01-05

## 2025-01-06 ENCOUNTER — NURSE TRIAGE (OUTPATIENT)
Dept: ONCOLOGY | Facility: CLINIC | Age: 60
End: 2025-01-06
Payer: MEDICARE

## 2025-01-06 NOTE — TELEPHONE ENCOUNTER
Oncology Nurse Triage - Reporting Symptoms    Situation:   Frandy reporting the following symptoms: diarrhea and cold symptoms.     Background:   Treating Provider:  Dr. Miguel Villarreal    Date of last office visit: 12/11/24 Alisha Marcs     Recent treatments: Yes: sorafenib  12/11/24  IVF and magnesium replacement therapy.      Assessment  Onset of symptoms: today  -Diarrhea from 3205-3787- said stool had spinach pieces and watery color stool was dark brown with dots of black, q15min until 6am. He takes Lomotil x1 dose around 12am and then after diarrhea started, took 2 doses Loperamide. He fell asleep from 6am-10am when his friend woke him up.   Ate egg whites, spinach, and tomatoes yesterday AM. Also had 1 blueberry pancake.   Denies abdomen pain or cramping. Has not had any further stools since 6am. He is taking his medications now at 1045.   He is still on Eliquis.   He did drink water and urinated after waking up. He feels okay otherwise. Plans to try and eat eggs.   Weigh- 178 lbs yesterday, which is high; today is 172 lbs    -Cold symptoms started this morning, noticed by friend otherwise pt would not have noticed right away- nasal congestion, cough which is mainly dry but some congestion mucus comes on occasion.     Feels a little cold, but nothing out of ordinary for him.   Denies fever, sore throat, other signs of bleeding, lightheadedness.     Was out to watch football game at [employer] and took anti-rejection medications and sorafenib late (11pm last night), which he said he is not supposed to do.   Denies knowingly being around anyone that is sick, but was at a bar last night so could have been exposed.     Pt wonders if it was something he ate causing him to be sick or because he messed up his medication routine and took his meds later that would cause it?     Recommendations:   Advised pt push fluids, monitor temperature and cold symptoms for now- advised he call triage if diarrhea returns, fever  develops, or mucus drainage changes to yellow/green color as this may be sign of infection. Writer suggested keeping foods bland for today and avoid eggs/dairy, suggested toast, bananas, rice, applesauce. Pt voiced understanding. Writer informed will send message to care team and call him back if there are further recommendations.

## 2025-01-07 ENCOUNTER — MYC MEDICAL ADVICE (OUTPATIENT)
Dept: ONCOLOGY | Facility: CLINIC | Age: 60
End: 2025-01-07
Payer: MEDICARE

## 2025-01-07 DIAGNOSIS — E83.39 HYPOPHOSPHATEMIA: ICD-10-CM

## 2025-01-07 NOTE — CONFIDENTIAL NOTE
Phospha Refill   Last prescribing provider: DR Villarreal     Last clinic visit date: 12/11/24 Alisha Breen     Recommendations for requested medication (if none, N/A): Copied from chart note   PHOSPHORUS TABLET 250  MG per tablet TAKE 1 TABLET BY MOUTH 4 TIMES DAILY 120 tablet 1     Any other pertinent information (if none, N/A): N/A    Refilled: Y/N, if NO, why?

## 2025-01-08 ENCOUNTER — OFFICE VISIT (OUTPATIENT)
Dept: OPHTHALMOLOGY | Facility: CLINIC | Age: 60
End: 2025-01-08
Attending: OPHTHALMOLOGY
Payer: MEDICARE

## 2025-01-08 DIAGNOSIS — E11.3593 PROLIFERATIVE DIABETIC RETINOPATHY OF BOTH EYES ASSOCIATED WITH TYPE 2 DIABETES MELLITUS, UNSPECIFIED PROLIFERATIVE RETINOPATHY TYPE (H): Primary | ICD-10-CM

## 2025-01-08 DIAGNOSIS — E11.3513 PROLIFERATIVE DIABETIC RETINOPATHY OF BOTH EYES WITH MACULAR EDEMA ASSOCIATED WITH TYPE 2 DIABETES MELLITUS (H): ICD-10-CM

## 2025-01-08 PROCEDURE — G0463 HOSPITAL OUTPT CLINIC VISIT: HCPCS | Performed by: OPHTHALMOLOGY

## 2025-01-08 PROCEDURE — 92134 CPTRZ OPH DX IMG PST SGM RTA: CPT | Performed by: OPHTHALMOLOGY

## 2025-01-08 ASSESSMENT — CONF VISUAL FIELD
OD_INFERIOR_NASAL_RESTRICTION: 0
OS_INFERIOR_TEMPORAL_RESTRICTION: 0
METHOD: COUNTING FINGERS
OD_SUPERIOR_TEMPORAL_RESTRICTION: 0
OD_NORMAL: 1
OS_INFERIOR_NASAL_RESTRICTION: 0
OS_SUPERIOR_TEMPORAL_RESTRICTION: 0
OD_SUPERIOR_NASAL_RESTRICTION: 0
OD_INFERIOR_TEMPORAL_RESTRICTION: 0
OS_NORMAL: 1
OS_SUPERIOR_NASAL_RESTRICTION: 0

## 2025-01-08 ASSESSMENT — REFRACTION_WEARINGRX
OS_SPHERE: -0.50
OD_AXIS: 180
SPECS_TYPE: PAL
OS_AXIS: 080
OD_ADD: +2.75
OD_SPHERE: -0.50
OS_CYLINDER: +0.25
OD_CYLINDER: +0.50
OS_ADD: +2.75

## 2025-01-08 ASSESSMENT — TONOMETRY
OS_IOP_MMHG: 17
OD_IOP_MMHG: 19
IOP_METHOD: TONOPEN

## 2025-01-08 ASSESSMENT — VISUAL ACUITY
OS_CC: 20/25
OD_CC+: -2
CORRECTION_TYPE: GLASSES
OS_CC+: -2
OD_CC: 20/25
METHOD: SNELLEN - LINEAR

## 2025-01-08 ASSESSMENT — EXTERNAL EXAM - RIGHT EYE: OD_EXAM: NORMAL

## 2025-01-08 ASSESSMENT — EXTERNAL EXAM - LEFT EYE: OS_EXAM: NORMAL

## 2025-01-08 ASSESSMENT — CUP TO DISC RATIO
OS_RATIO: 0.4
OD_RATIO: 0.3

## 2025-01-08 ASSESSMENT — SLIT LAMP EXAM - LIDS
COMMENTS: SMALL PAPILLOMA ALONG LL MARGIN, NON CONCERNING
COMMENTS: NORMAL

## 2025-01-08 NOTE — PROGRESS NOTES
CC:  Follow up Diabetic retinopathy     Interval:  Here for follow-up of Type 2 diabetes mellitus with PDR both eyes and macular edema. Possible inj  Feels eyes are dry; no changes in vision; no flashes and floaters     Review of systems: started chemo for GI Ca. Was in the ICU because of  kidney failure- emergent dialysis  He has had history of sinus issues worsening this year with allergy season. VA subjectively unchanged, no flashes, no floaters. Last visit he received a left eye injection only.  He is working on walking more which is helping with weight loss.     HPI: Frandy Workman is a 59 year old patient status post Cataract extraction intraocular lens left eye  history of Diabetes mellitus for 24 ys . Patient on insulin.  Patient on eliquis because of  Ca  Interval History: s/p CEIOL left eye 1/3/22    Retinal Imaging:  Optical Coherence Tomography 01/08/25   RE: Resolved central Diabetic macular edema.   LE:  slightly increased intraretinal fluid. Mild Epiretinal membrane     fluorescein angiography 09/13/23  both eyes normal AV and choroidal filling ou. Staining laser scars. No obvious NVE, mild late macula leakage. peripheral leakage and capillary non perfusion.     Optos consistent with clinical exam    ASSESSMENT:     #T2DM   - HbA1c 5.7% (12/2023)  - Blood pressure (<120/80) and blood glucose (HbA1c <7.0, ~6.5 today) control discussed with patient. Patient advised that failure to adequately control each may lead to vision loss. The patient expressed understanding.    # Proliferative diabetic retinopathy left eye; pre-proliferative diabetic retinopathy right eye    # vitreous hemorrhage left eye 10/12/22   Status post Eylea inj left eye   Status post Panretinal laser photocoagulation (PRP) 9.28.22 left eye and Status post scatter PRP right eye 11/02/22     # Diabetic macular edema both eyes   - status post avastin in both eyes; Switch to Eylea 4/24/19 with improvement of Diabetic macular edema     -  Tried holding off on injection 3/29/23, but edema worsened both eyes  - last Eylea right eye; 8/02/23 interval improvement 09/13/23 (9 weeks prior)  -  discussed with to T&E vs closed observation on 9/13/23, He preferred to observe   -Given mild worsening 10/04/23 changed to Vabysmo right eye (10/04/23 #1; 1.10.23 #3)  - VA 20/25, Resolved DME  Consider ozurdex in the future if no response to vabysmo  01/08/25 stable right eye; slightly worse left eye- will observe    # status post Cataract extraction intraocular lens both eyes   Right eye: 01/24/23; left eye 01/3/23  With posterior capsular opacity (PCO) right eye worse than left eye  Consider yag next follow up   Retina detachment and endophthalmitis precautions were discussed with the patient (increased blurry vision, drainage, new flashes, floaters or a curtain in the visual field) and was asked to return if any of the those occur    # Dry eye syndrome   Left eye worse than right eye   warm compresses and artificial tears  As needed  - punctal plugs previously left eye - reports this is better     # Status post liver transplant  - tx 11/2019  - severe anemia; s/p transfusion - anemia much improved   - being seen by his primary team    PLAN:- Vabysmo inj right eye #4 03/06/24   Will T&E  Last dilation 9.13.23  - Follow up in 8 weeks with Optical Coherence Tomography, dilation;  with fluorescein angiography transits left eye;   possible Vabysmo; Consider yag next follow up     ~~~~~~~~~~~~~~~~~~~~~~~~~~~~~~~~~~   Complete documentation of historical and exam elements from today's encounter can be found in the full encounter summary report (not reduplicated in this progress note).  I personally obtained the chief complaint(s) and history of present illness.  I confirmed and edited as necessary the review of systems, past medical/surgical history, family history, social history, and examination findings as documented by others; and I examined the patient myself.  I  personally reviewed the relevant tests, images, and reports as documented above.  I formulated and edited as necessary the assessment and plan and discussed the findings and management plan with the patient and family    Enriqueta Maritn MD   of Ophthalmology.  Retina Service   Department of Ophthalmology and Visual Neurosciences   [facility]  Phone: [phone]   Fax: [phone]

## 2025-01-08 NOTE — NURSING NOTE
Chief Complaints and History of Present Illnesses   Patient presents with    Diabetic Retinopathy Follow Up     10m follow up - Proliferative diabetic retinopathy of both eyes associated with type 2 diabetes mellitus, unspecified proliferative retinopathy type     Chief Complaint(s) and History of Present Illness(es)       Diabetic Retinopathy Follow Up              Comments: 10m follow up - Proliferative diabetic retinopathy of both eyes associated with type 2 diabetes mellitus, unspecified proliferative retinopathy type              Comments    Pt is here for follow up - pt states vision is gradually decreasing. Pt is having a harder time seeing at night and has had an increase in light sensitivity. Pt is having flashes at night and occasional  floaters left eye> right eye. Eyes feel dry and itchy but denies eye pain.     Treatment:   AT both eyes     DM 2     Last BS: 105 this morning     Lab Results       Component                Value               Date                       A1C                      5.6                 12/04/2024                 A1C                      5.6                 09/05/2024                 A1C                      5.6                 05/13/2024                 A1C                      5.7                 12/18/2023                 A1C                      6.3                 06/14/2023                 A1C                      6.9                 12/14/2022                 A1C                      6.0                 12/30/2020                 A1C                      6.3                 06/13/2020                 A1C                      6.6                 08/08/2018                 A1C                      6.5                 06/09/2017                 A1C                      7.8                 10/25/2016              Radha Ng 1:35 PM  January 8, 2025

## 2025-01-09 ENCOUNTER — TELEPHONE (OUTPATIENT)
Dept: ONCOLOGY | Facility: CLINIC | Age: 60
End: 2025-01-09
Payer: MEDICARE

## 2025-01-09 NOTE — ORAL ONC MGMT
Oral Chemotherapy Monitoring Program     Placed call to patient for follow up of oral chemotherapy (sorafenib). Call placed to follow up on patient reported symptoms from earlier this week      Left message to please call back otherwise we will attempt a call in the next few days. No drug names were mentioned.     Vamshi Cruz, PharmD, [facility]  Oral Chemotherapy Monitoring Program  [facility]  [phone]

## 2025-01-13 ENCOUNTER — MYC MEDICAL ADVICE (OUTPATIENT)
Dept: ONCOLOGY | Facility: CLINIC | Age: 60
End: 2025-01-13
Payer: MEDICARE

## 2025-01-13 DIAGNOSIS — Z94.4 STATUS POST LIVER TRANSPLANTATION (H): ICD-10-CM

## 2025-01-13 DIAGNOSIS — C78.6 MALIGNANT NEOPLASM METASTATIC TO PERITONEUM (H): ICD-10-CM

## 2025-01-13 DIAGNOSIS — C22.0 HCC (HEPATOCELLULAR CARCINOMA) (H): ICD-10-CM

## 2025-01-13 RX ORDER — DIPHENOXYLATE HYDROCHLORIDE AND ATROPINE SULFATE 2.5; .025 MG/1; MG/1
1 TABLET ORAL 4 TIMES DAILY PRN
Qty: 60 TABLET | Refills: 0 | Status: SHIPPED | OUTPATIENT
Start: 2025-01-13

## 2025-01-13 NOTE — ORAL ONC MGMT
Oral Chemotherapy Monitoring Program     Placed call to Frandy Workman in follow up of sorafenib oral chemotherapy.   This call was to check in on diarrhea and cold symptoms reported last week 1/6/25.  Left a message requesting a call back. No drug names were mentioned in the voicemail. I also sent a MyC message. Will update when response is received.    ADDENDUM 1/13/2025 11:50am  Miller returned my phone call. He states diarrhea is still occurring, but overall he is feeling better and it is back at his baseline. He does report that maybe some of the diarrhea is related to his diet. He doesn't feel dehydrated  and reports drinking 130-180 oz per day. He is using loperamide daily (2 tabs at bedtime) and also as needed. He states he lost Lomotil supply while traveling. He does feels that is was helpful so I will sent a message to Alisha Breen asking if a refill could be sent.   Miller voiced no other questions or concerns at this time.    Fallon Coello, PharmD  Hematology/Oncology Clinical Pharmacist  Oral Chemotherapy Monitoring Program  [facility]  [phone]  January 13, 2025

## 2025-01-20 DIAGNOSIS — E87.5 HYPERKALEMIA: ICD-10-CM

## 2025-01-20 RX ORDER — SODIUM ZIRCONIUM CYCLOSILICATE 10 G/10G
POWDER, FOR SUSPENSION ORAL
Qty: 30 PACKET | Refills: 10 | Status: SHIPPED | OUTPATIENT
Start: 2025-01-20

## 2025-01-20 NOTE — TELEPHONE ENCOUNTER
1/20- Medication filled and sent to [facility] pharmacy.  Maximo NIETO RN  Nephrology care coordinator  [place]

## 2025-01-24 DIAGNOSIS — Z95.1 S/P CABG (CORONARY ARTERY BYPASS GRAFT): ICD-10-CM

## 2025-01-26 DIAGNOSIS — C22.0 HCC (HEPATOCELLULAR CARCINOMA) (H): Primary | ICD-10-CM

## 2025-01-26 DIAGNOSIS — C78.6 MALIGNANT NEOPLASM METASTATIC TO PERITONEUM (H): ICD-10-CM

## 2025-01-27 ENCOUNTER — OFFICE VISIT (OUTPATIENT)
Dept: ORTHOPEDICS | Facility: CLINIC | Age: 60
End: 2025-01-27
Payer: MEDICARE

## 2025-01-27 DIAGNOSIS — E11.628 TYPE 2 DIABETES MELLITUS WITH PRESSURE CALLUS (H): ICD-10-CM

## 2025-01-27 DIAGNOSIS — L60.2 ONYCHAUXIS: ICD-10-CM

## 2025-01-27 DIAGNOSIS — E11.49 TYPE II OR UNSPECIFIED TYPE DIABETES MELLITUS WITH NEUROLOGICAL MANIFESTATIONS, NOT STATED AS UNCONTROLLED(250.60) (H): Primary | ICD-10-CM

## 2025-01-27 DIAGNOSIS — E11.51 DIABETES MELLITUS WITH PERIPHERAL VASCULAR DISEASE (H): ICD-10-CM

## 2025-01-27 DIAGNOSIS — L84 TYPE 2 DIABETES MELLITUS WITH PRESSURE CALLUS (H): ICD-10-CM

## 2025-01-27 DIAGNOSIS — E11.69 TYPE 2 DIABETES MELLITUS WITH DIABETIC FOOT DEFORMITY (H): ICD-10-CM

## 2025-01-27 DIAGNOSIS — M21.969 TYPE 2 DIABETES MELLITUS WITH DIABETIC FOOT DEFORMITY (H): ICD-10-CM

## 2025-01-27 DIAGNOSIS — N18.31 TYPE 2 DIABETES MELLITUS WITH STAGE 3A CHRONIC KIDNEY DISEASE, WITH LONG-TERM CURRENT USE OF INSULIN (H): ICD-10-CM

## 2025-01-27 DIAGNOSIS — Z79.4 TYPE 2 DIABETES MELLITUS WITH STAGE 3A CHRONIC KIDNEY DISEASE, WITH LONG-TERM CURRENT USE OF INSULIN (H): ICD-10-CM

## 2025-01-27 DIAGNOSIS — E11.22 TYPE 2 DIABETES MELLITUS WITH STAGE 3A CHRONIC KIDNEY DISEASE, WITH LONG-TERM CURRENT USE OF INSULIN (H): ICD-10-CM

## 2025-01-27 PROCEDURE — 11057 PARNG/CUTG B9 HYPRKR LES >4: CPT | Mod: Q8 | Performed by: PODIATRIST

## 2025-01-27 PROCEDURE — 11719 TRIM NAIL(S) ANY NUMBER: CPT | Mod: XS | Performed by: PODIATRIST

## 2025-01-27 PROCEDURE — 99214 OFFICE O/P EST MOD 30 MIN: CPT | Mod: 25 | Performed by: PODIATRIST

## 2025-01-27 NOTE — NURSING NOTE
Reason For Visit:   Chief Complaint   Patient presents with    RECHECK     Toe nail trim and callus        There were no vitals taken for this visit.    Pain Assessment  Patient Currently in Pain: Yes  Patient's Stated Pain Goal: 7  0-10 Pain Scale: 7  Primary Pain Location: Foot (Bilateral)    Melodie Marmolejo CMA

## 2025-01-27 NOTE — LETTER
1/27/2025      Frandy Workman  [address] [facility] [address]      Dear Colleague,    Thank you for referring your patient, Frandy Workman, to the [facility] [place]. Please see a copy of my visit note below.    Past Medical History:   Diagnosis Date    Anemia 2013    Arthritis     BPH (benign prostatic hyperplasia)     CAD (coronary artery disease) 04/01/2019    Cholelithiasis     Conductive hearing loss 08/16/2017    Depressive disorder 1986    Suffer effects throughout life    Gastroesophageal reflux disease 12/01/2014    HCC (hepatocellular carcinoma) (H) 01/22/2019    History of blood transfusion 2019    Had blood transfussion until Dec 2022    History of diabetic retinopathy 07/2018    HTN (hypertension) 11/20/2019    Hyperlipidemia     Liver cirrhosis secondary to ESTRADA (H)     Liver transplanted (H) 11/11/2019    Portal vein thrombosis 04/11/2019    SCC (squamous cell carcinoma) 02/15/2023    02/15/23:  Left temporal scalp    Type II diabetes mellitus (H)      Patient Active Problem List   Diagnosis    Bipolar affective disorder in remission    Esophageal varices determined by endoscopy (H)    Erectile dysfunction due to diseases classified elsewhere    HCC (hepatocellular carcinoma) (H)    Equivocal stress echocardiogram    Type 2 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, unspecified whether long term insulin use (H)    Status post coronary angiogram    CAD (coronary artery disease)    Liver transplant recipient (H)    Status post liver transplantation (H)    Immunosuppressed status    Benign essential hypertension    Anemia of chronic renal failure, stage 3a (H)    History of coronary artery disease    Dyslipidemia    Excessive sweating    Stage 3b chronic kidney disease (H)    Anemia of chronic renal failure, stage 3b (H)    NSTEMI (non-ST elevated myocardial infarction) (H)    Chest pain, unspecified type     Hypertensive chronic kidney disease with stage 5 chronic kidney disease or end stage renal disease (H)    Vitreous hemorrhage of left eye (H)    Proliferative diabetic retinopathy of both eyes associated with type 2 diabetes mellitus, unspecified proliferative retinopathy type (H)    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)    Liver replaced by transplant (H)    Hyperkalemia    Acute renal failure, unspecified acute renal failure type    ARIELA (obstructive sleep apnea)    History of nonmelanoma skin cancer    Neoplasm of unspecified behavior of bone, soft tissue, and skin    SCC (squamous cell carcinoma)    AK (actinic keratosis)     Past Surgical History:   Procedure Laterality Date    ABDOMEN SURGERY  11/11/2019    Had Liver Transplant    BIOPSY  12/2022    Had biopsy done will have additional procedure 2/15/2023    BYPASS GRAFT ARTERY CORONARY N/A 07/14/2021    Procedure: median sternotomy, on cardiopulmonary bypass, CORONARY ARTERY BYPASS GRAFT (CABG) x2 with left greater saphenous vein endoscopic harvest and left internal mammery artery harvest;  Surgeon: Tom Zapata MD;  Location: [facility] OR    CARDIAC SURGERY  7/2021    Heart Graph. and bypass surgery    CHOLECYSTECTOMY  11/11/2022    Removed with Liver Transplant    COLONOSCOPY      2015    COLONOSCOPY N/A 12/06/2019    Procedure: COLONOSCOPY, WITH POLYPECTOMY AND BIOPSY;  Surgeon: Adam Morton MD;  Location:  GI    CV CENTRAL VENOUS CATHETER PLACEMENT N/A 07/12/2021    Procedure: Central Venous Catheter Placement;  Surgeon: Fermin Polanco MD;  Location: [facility] CARDIAC CATH LAB    CV CORONARY ANGIOGRAM N/A 07/12/2021    Procedure: Coronary Angiogram;  Surgeon: Fermin Polanco MD;  Location: [facility] CARDIAC CATH LAB    CV HEART CATHETERIZATION WITH POSSIBLE INTERVENTION N/A 02/26/2019    Procedure: CORS;  Surgeon: Jagdish Hoyt MD;  Location: [facility] CARDIAC CATH LAB    CV INTRA AORTIC BALLOON N/A  07/12/2021    Procedure: Intra Aortic Balloon Pump Insertion;  Surgeon: Fermin Polanco MD;  Location:  HEART CARDIAC CATH LAB    ESOPHAGOSCOPY, GASTROSCOPY, DUODENOSCOPY (EGD), COMBINED N/A 11/17/2016    Procedure: COMBINED ESOPHAGOSCOPY, GASTROSCOPY, DUODENOSCOPY (EGD);  Surgeon: Santi Rosas MD;  Location:  GI    ESOPHAGOSCOPY, GASTROSCOPY, DUODENOSCOPY (EGD), COMBINED N/A 11/17/2017    Procedure: COMBINED ESOPHAGOSCOPY, GASTROSCOPY, DUODENOSCOPY (EGD);  EGD;  Surgeon: Santi Rosas MD;  Location:  GI    ESOPHAGOSCOPY, GASTROSCOPY, DUODENOSCOPY (EGD), COMBINED N/A 12/28/2018    Procedure: EGD;  Surgeon: Santi Rosas MD;  Location:  OR    ESOPHAGOSCOPY, GASTROSCOPY, DUODENOSCOPY (EGD), COMBINED N/A 12/06/2019    Procedure: ESOPHAGOGASTRODUODENOSCOPY, WITH BIOPSY;  Surgeon: Adam Morton MD;  Location:  GI    ESOPHAGOSCOPY, GASTROSCOPY, DUODENOSCOPY (EGD), COMBINED N/A 02/13/2020    Procedure: ESOPHAGOGASTRODUODENOSCOPY (EGD);  Surgeon: Santi Rosas MD;  Location:  GI    EXCISE MASS ABDOMEN N/A 07/13/2023    Procedure: Laparoscopic lysis of adhesions, laparoscopic resection of abdominal mass;  Surgeon: Ruiz Chu MD;  Location:  OR    HEAD & NECK SURGERY      12/2017 at [facility].     IMPLANT GOLD WEIGHT EYELID Right 11/16/2017    Procedure: IMPLANT WEIGHT EYELID;  Right Upper Eyelid Weight, right tarsal strip lower eyelid;  Surgeon: Milana Malave MD;  Location:  OR    IR CHEMO EMBOLIZATION  01/22/2019    KNEE SURGERY Left     ORTHOPEDIC SURGERY      PAROTIDECTOMY, RADICAL NECK DISSECTION Right 11/02/2017    Procedure: PAROTIDECTOMY, RADICAL NECK DISSECTION;  Right Superfacial Parotidectomy , Facial nerve repair. with [facility] facial nerve monitor.;  Surgeon: Asiya Morgan MD;  Location:  OR    PHACOEMULSIFICATION CLEAR CORNEA WITH STANDARD INTRAOCULAR LENS IMPLANT Right 01/24/2023    Procedure: PHACOEMULSIFICATION, COMPLEX  CATARACT, WITH INTRAOCULAR LENS IMPLANT WITH TRYPAN RIGHT EYE;  Surgeon: Enriqueta Martin MD;  Location: [facility] OR    PHACOEMULSIFICATION WITH STANDARD INTRAOCULAR LENS IMPLANT Left 2023    Procedure: PHACOEMULSIFICATION, COMPLEX CATARACT, WITH STANDARD INTRAOCULAR LENS IMPLANT INSERTION LEFT EYE;  Surgeon: Enriqueta Martin MD;  Location: [facility] OR    PICC INSERTION Left 2017    4fr SL BioFlo PICC, 44cm in the L basilic vein w/ tip in the low SVC    RETURN LIVER TRANSPLANT N/A 2019    Procedure: Exploratory laparotomy, hematoma evacuation, abdominal washout;  Surgeon: Александр Ramos MD;  Location: [facility] OR    TRANSPLANT LIVER RECIPIENT  DONOR N/A 2019    Procedure: TRANSPLANT, LIVER, RECIPIENT,  DONOR;  Surgeon: Александр Ramos MD;  Location: [facility] OR    VASCULAR SURGERY       Social History     Socioeconomic History    Marital status:      Spouse name: Not on file    Number of children: Not on file    Years of education: Not on file    Highest education level: Bachelor's degree (e.g., BA, AB, BS)   Occupational History    Not on file   Tobacco Use    Smoking status: Former     Types: Dip, chew, snus or snuff     Passive exposure: Past    Smokeless tobacco: Former     Types: Chew     Quit date: 10/31/2017    Tobacco comments:     Quit [place] after Doctor told me in 2017 that if I didn't I would   Vaping Use    Vaping status: Never Used   Substance and Sexual Activity    Alcohol use: No     Comment: quit 1996    Drug use: No    Sexual activity: Not Currently     Partners: Female     Birth control/protection: Condom   Other Topics Concern    Parent/sibling w/ CABG, MI or angioplasty before 65F 55M? No   Social History Narrative    Prior , [place]     Social Drivers of Health     Financial Resource Strain: Low Risk  (2024)    Financial Resource Strain     Within the past 12 months, have you or your family members you live with been  unable to get utilities (heat, electricity) when it was really needed?: No   Food Insecurity: Low Risk  (1/23/2024)    Food Insecurity     Within the past 12 months, did you worry that your food would run out before you got money to buy more?: No     Within the past 12 months, did the food you bought just not last and you didn t have money to get more?: No   Transportation Needs: Low Risk  (1/23/2024)    Transportation Needs     Within the past 12 months, has lack of transportation kept you from medical appointments, getting your medicines, non-medical meetings or appointments, work, or from getting things that you need?: No   Physical Activity: Insufficiently Active (10/13/2020)    Exercise Vital Sign     Days of Exercise per Week: 1 day     Minutes of Exercise per Session: 60 min   Stress: Stress Concern Present (10/13/2020)    [employer] [place] [employer] - Occupational Stress Questionnaire     Feeling of Stress : To some extent   Social Connections: Socially Isolated (10/13/2020)    Social Connection and Isolation Panel [NHANES]     Frequency of Communication with Friends and Family: Once a week     Frequency of Social Gatherings with Friends and Family: Once a week     Attends Anabaptism Services: Never     Active Member of Clubs or Organizations: No     Attends Club or Organization Meetings: Never     Marital Status:    Interpersonal Safety: Low Risk  (12/5/2024)    Interpersonal Safety     Do you feel physically and emotionally safe where you currently live?: Yes     Within the past 12 months, have you been hit, slapped, kicked or otherwise physically hurt by someone?: No     Within the past 12 months, have you been humiliated or emotionally abused in other ways by your partner or ex-partner?: No   Housing Stability: Low Risk  (1/23/2024)    Housing Stability     Do you have housing? : Yes     Are you worried about losing your housing?: No     Family History   Problem Relation Age of Onset  "   Skin Cancer Mother     Cancer Mother         mastectomy    Diabetes Mother          3/2016    Cerebrovascular Disease Mother         Passed away in Feb of this year, 80 years old.    Thyroid Disease Mother     Depression Mother     Breast Cancer Mother     Obesity Mother         280 pounds  from this and complications from D    Pancreatic Cancer Father 60    Prostate Cancer Father         Currently in Remission    Macular Degeneration Father     Glaucoma Father     Skin Cancer Father     Coronary Artery Disease Father         Started in his 70's  at 88 in Octobe2023    Colon Cancer Father     Thyroid Disease Sister     Depression Sister     Asthma Sister     Sleep Apnea Brother     Colorectal Cancer Maternal Grandmother     Cancer Maternal Grandmother     Substance Abuse Maternal Grandmother         Alcohol    Prostate Cancer Maternal Grandfather     Substance Abuse Maternal Grandfather         Alcohol    Colorectal Cancer Paternal Grandmother     Asthma Sister         Had since birth    Liver Disease No family hx of     Melanoma No family hx of     Anesthesia Reaction No family hx of     Deep Vein Thrombosis (DVT) No family hx of            Lab Results   Component Value Date    A1C 5.6 2024    A1C 5.6 2024    A1C 5.6 2024    A1C 5.7 2023    A1C 6.3 2023    A1C 6.9 2022    A1C 6.0 2020    A1C 6.3 2020    A1C 6.6 2018    A1C 6.5 2017    A1C 7.8 10/25/2016     Lab Results   Component Value Date    WBC 6.4 2024    WBC 4.4 2021     Lab Results   Component Value Date    RBC 3.09 2024    RBC 2.35 2021     Lab Results   Component Value Date    HGB 9.2 2024    HGB 7.3 2021     Lab Results   Component Value Date    HCT 30.4 2024    HCT 22.6 2021     No components found for: \"MCT\"  Lab Results   Component Value Date    MCV 98 2024    MCV 96 2021     Lab Results   Component Value Date    " "MCH 29.8 12/18/2024    MCH 31.1 06/28/2021     Lab Results   Component Value Date    MCHC 30.3 12/18/2024    MCHC 32.3 06/28/2021     Lab Results   Component Value Date    RDW 15.7 12/18/2024    RDW 15.1 06/28/2021     Lab Results   Component Value Date     12/18/2024     06/28/2021     Last Comprehensive Metabolic Panel:  Lab Results   Component Value Date     12/18/2024    POTASSIUM 5.6 (H) 12/18/2024    CHLORIDE 105 12/18/2024    CO2 21 (L) 12/18/2024    ANIONGAP 14 12/18/2024     (H) 12/18/2024    BUN 37.2 (H) 12/18/2024    CR 1.68 (H) 12/18/2024    GFRESTIMATED 46 (L) 12/18/2024    DEBORAH 8.2 (L) 12/18/2024     Lab Results   Component Value Date    AST 29 12/18/2024    AST 9 06/28/2021     Lab Results   Component Value Date    ALT 26 12/18/2024    ALT 20 06/28/2021     No results found for: \"BILICONJ\"   Lab Results   Component Value Date    BILITOTAL 0.3 12/18/2024    BILITOTAL 0.5 06/28/2021     Lab Results   Component Value Date    ALBUMIN 4.1 12/18/2024    ALBUMIN 4.3 07/20/2022    ALBUMIN 4.0 06/28/2021     Lab Results   Component Value Date    PROTTOTAL 7.0 12/18/2024    PROTTOTAL 7.4 06/28/2021      Lab Results   Component Value Date    ALKPHOS 93 12/18/2024    ALKPHOS 98 06/28/2021         Subjective findings- 59-year-old returns clinic for Diabetes with peripheral Neuropathy and foot deformity and callus.  Relates he got his new Diabetic shoes and those are good.  Relates he is doing about 9200 steps a day.  Relates he is working out 2-3 times a week as well with weights.  Relates to no injuries, no ulcers, no sores since we seen him last.  Relates the calluses buildup and hurt at times.  Relates he needs his toenails cut.  Relates he has numbness tingling and neuropathy in his feet.    Objective findings- DP and PT are 2 out of 4 bilaterally.  Has decreased hair growth bilaterally.  Has dorsally contracted digits 2 through 5 bilaterally.  Has functional hallux limitus " bilaterally.  Has nucleated hyperkeratotic tissue buildup plantar first and fifth MPJ bilaterally, plantar medial hallux bilaterally, plantar fourth MPJ right, plantar 2 through 4 MPJs left with mild pain on palpation.  Sharp/dull is decreased with 5.07 [place] monofilament on 2 spots on the left foot and intact on the right foot.  Proprioception is intact bilaterally.  Deep tendon reflexes are intact bilaterally.  No tendon voids bilaterally.  There is no erythema, no edema, no drainage, no odor, no calor bilaterally.    Assessment and plan- Onychauxis with Onychocryptosis bilaterally.  Diabetes with peripheral Neuropathy.  Hammertoes and functional Hallux Limitus bilaterally.  Tylomas bilaterally causing pain.  Diagnosis and treatment options discussed with the patient.  All the toenails were reduced bilaterally upon consent.  All the Tylomas bilaterally were sharp debrided with a 10 blade upon consent, 5+ lesions were debrided.  Continue the diabetic shoes.  Previous notes reviewed.  Return to clinic and see me in 2 to 3 months.    Moderate level of medical decision making.        Again, thank you for allowing me to participate in the care of your patient.      Sincerely,    Lamin Gonzalez DPM    Electronically signed

## 2025-01-27 NOTE — PROGRESS NOTES
Past Medical History:   Diagnosis Date    Anemia 2013    Arthritis     BPH (benign prostatic hyperplasia)     CAD (coronary artery disease) 04/01/2019    Cholelithiasis     Conductive hearing loss 08/16/2017    Depressive disorder 1986    Suffer effects throughout life    Gastroesophageal reflux disease 12/01/2014    HCC (hepatocellular carcinoma) (H) 01/22/2019    History of blood transfusion 2019    Had blood transfussion until Dec 2022    History of diabetic retinopathy 07/2018    HTN (hypertension) 11/20/2019    Hyperlipidemia     Liver cirrhosis secondary to ESTRADA (H)     Liver transplanted (H) 11/11/2019    Portal vein thrombosis 04/11/2019    SCC (squamous cell carcinoma) 02/15/2023    02/15/23:  Left temporal scalp    Type II diabetes mellitus (H)      Patient Active Problem List   Diagnosis    Bipolar affective disorder in remission    Esophageal varices determined by endoscopy (H)    Erectile dysfunction due to diseases classified elsewhere    HCC (hepatocellular carcinoma) (H)    Equivocal stress echocardiogram    Type 2 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, unspecified whether long term insulin use (H)    Status post coronary angiogram    CAD (coronary artery disease)    Liver transplant recipient (H)    Status post liver transplantation (H)    Immunosuppressed status    Benign essential hypertension    Anemia of chronic renal failure, stage 3a (H)    History of coronary artery disease    Dyslipidemia    Excessive sweating    Stage 3b chronic kidney disease (H)    Anemia of chronic renal failure, stage 3b (H)    NSTEMI (non-ST elevated myocardial infarction) (H)    Chest pain, unspecified type    Hypertensive chronic kidney disease with stage 5 chronic kidney disease or end stage renal disease (H)    Vitreous hemorrhage of left eye (H)    Proliferative diabetic retinopathy of both eyes associated with type 2 diabetes mellitus, unspecified  proliferative retinopathy type (H)    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)    Liver replaced by transplant (H)    Hyperkalemia    Acute renal failure, unspecified acute renal failure type    ARIELA (obstructive sleep apnea)    History of nonmelanoma skin cancer    Neoplasm of unspecified behavior of bone, soft tissue, and skin    SCC (squamous cell carcinoma)    AK (actinic keratosis)     Past Surgical History:   Procedure Laterality Date    ABDOMEN SURGERY  11/11/2019    Had Liver Transplant    BIOPSY  12/2022    Had biopsy done will have additional procedure 2/15/2023    BYPASS GRAFT ARTERY CORONARY N/A 07/14/2021    Procedure: median sternotomy, on cardiopulmonary bypass, CORONARY ARTERY BYPASS GRAFT (CABG) x2 with left greater saphenous vein endoscopic harvest and left internal mammery artery harvest;  Surgeon: Tom Zapata MD;  Location: [facility] OR    CARDIAC SURGERY  7/2021    Heart Graph. and bypass surgery    CHOLECYSTECTOMY  11/11/2022    Removed with Liver Transplant    COLONOSCOPY      2015    COLONOSCOPY N/A 12/06/2019    Procedure: COLONOSCOPY, WITH POLYPECTOMY AND BIOPSY;  Surgeon: Adam Morton MD;  Location:  GI    CV CENTRAL VENOUS CATHETER PLACEMENT N/A 07/12/2021    Procedure: Central Venous Catheter Placement;  Surgeon: Fermin Polanco MD;  Location: [facility] CARDIAC CATH LAB    CV CORONARY ANGIOGRAM N/A 07/12/2021    Procedure: Coronary Angiogram;  Surgeon: Fermin Polanco MD;  Location: [facility] CARDIAC CATH LAB    CV HEART CATHETERIZATION WITH POSSIBLE INTERVENTION N/A 02/26/2019    Procedure: CORS;  Surgeon: Jagdish Hoyt MD;  Location: [facility] CARDIAC CATH LAB    CV INTRA AORTIC BALLOON N/A 07/12/2021    Procedure: Intra Aortic Balloon Pump Insertion;  Surgeon: Fermin Polanco MD;  Location: [facility] CARDIAC CATH LAB    ESOPHAGOSCOPY, GASTROSCOPY, DUODENOSCOPY (EGD), COMBINED N/A 11/17/2016    Procedure: COMBINED  ESOPHAGOSCOPY, GASTROSCOPY, DUODENOSCOPY (EGD);  Surgeon: Santi Rosas MD;  Location: [facility] GI    ESOPHAGOSCOPY, GASTROSCOPY, DUODENOSCOPY (EGD), COMBINED N/A 11/17/2017    Procedure: COMBINED ESOPHAGOSCOPY, GASTROSCOPY, DUODENOSCOPY (EGD);  EGD;  Surgeon: Santi Rosas MD;  Location: [facility] GI    ESOPHAGOSCOPY, GASTROSCOPY, DUODENOSCOPY (EGD), COMBINED N/A 12/28/2018    Procedure: EGD;  Surgeon: Santi Rosas MD;  [facility]    ESOPHAGOSCOPY, GASTROSCOPY, DUODENOSCOPY (EGD), COMBINED N/A 12/06/2019    Procedure: ESOPHAGOGASTRODUODENOSCOPY, WITH BIOPSY;  Surgeon: Adam Morton MD;  Location: [facility] GI    ESOPHAGOSCOPY, GASTROSCOPY, DUODENOSCOPY (EGD), COMBINED N/A 02/13/2020    Procedure: ESOPHAGOGASTRODUODENOSCOPY (EGD);  Surgeon: Santi Rosas MD;  Location:  GI    EXCISE MASS ABDOMEN N/A 07/13/2023    Procedure: Laparoscopic lysis of adhesions, laparoscopic resection of abdominal mass;  Surgeon: Ruiz Chu MD;  Location:  OR    HEAD & NECK SURGERY      12/2017 at [facility].     IMPLANT GOLD WEIGHT EYELID Right 11/16/2017    Procedure: IMPLANT WEIGHT EYELID;  Right Upper Eyelid Weight, right tarsal strip lower eyelid;  Surgeon: Milana Malave MD;  [facility]: [facility]    IR CHEMO EMBOLIZATION  01/22/2019    KNEE SURGERY Left     ORTHOPEDIC SURGERY      PAROTIDECTOMY, RADICAL NECK DISSECTION Right 11/02/2017    Procedure: PAROTIDECTOMY, RADICAL NECK DISSECTION;  Right Superfacial Parotidectomy , Facial nerve repair. with [facility] facial nerve monitor.;  Surgeon: Asiya Morgan MD;  Location: [facility] OR    PHACOEMULSIFICATION CLEAR CORNEA WITH STANDARD INTRAOCULAR LENS IMPLANT Right 01/24/2023    Procedure: PHACOEMULSIFICATION, COMPLEX CATARACT, WITH INTRAOCULAR LENS IMPLANT WITH TRYPAN RIGHT EYE;  Surgeon: Enriqueta Martin MD;  Location: [facility] OR    PHACOEMULSIFICATION WITH STANDARD INTRAOCULAR LENS IMPLANT Left 01/03/2023    Procedure: PHACOEMULSIFICATION, COMPLEX  CATARACT, WITH STANDARD INTRAOCULAR LENS IMPLANT INSERTION LEFT EYE;  Surgeon: Enriqueta Martin MD;  Location: [facility] OR    PICC INSERTION Left 2017    4fr SL BioFlo PICC, 44cm in the L basilic vein w/ tip in the low SVC    RETURN LIVER TRANSPLANT N/A 2019    Procedure: Exploratory laparotomy, hematoma evacuation, abdominal washout;  Surgeon: Александр Ramos MD;  Location: [facility] OR    TRANSPLANT LIVER RECIPIENT  DONOR N/A 2019    Procedure: TRANSPLANT, LIVER, RECIPIENT,  DONOR;  Surgeon: Александр Ramos MD;  Location: [facility] OR    VASCULAR SURGERY       Social History     Socioeconomic History    Marital status:      Spouse name: Not on file    Number of children: Not on file    Years of education: Not on file    Highest education level: Bachelor's degree (e.g., BA, AB, BS)   Occupational History    Not on file   Tobacco Use    Smoking status: Former     Types: Dip, chew, snus or snuff     Passive exposure: Past    Smokeless tobacco: Former     Types: Chew     Quit date: 10/31/2017    Tobacco comments:     Quit [place] after Doctor told me in 2017 that if I didn't I would   Vaping Use    Vaping status: Never Used   Substance and Sexual Activity    Alcohol use: No     Comment: quit 1996    Drug use: No    Sexual activity: Not Currently     Partners: Female     Birth control/protection: Condom   Other Topics Concern    Parent/sibling w/ CABG, MI or angioplasty before 65F 55M? No   Social History Narrative    Prior , [place]     Social Drivers of Health     Financial Resource Strain: Low Risk  (2024)    Financial Resource Strain     Within the past 12 months, have you or your family members you live with been unable to get utilities (heat, electricity) when it was really needed?: No   Food Insecurity: Low Risk  (2024)    Food Insecurity     Within the past 12 months, did you worry that your food would run out before you got money to buy more?:  No     Within the past 12 months, did the food you bought just not last and you didn t have money to get more?: No   Transportation Needs: Low Risk  (2024)    Transportation Needs     Within the past 12 months, has lack of transportation kept you from medical appointments, getting your medicines, non-medical meetings or appointments, work, or from getting things that you need?: No   Physical Activity: Insufficiently Active (10/13/2020)    Exercise Vital Sign     Days of Exercise per Week: 1 day     Minutes of Exercise per Session: 60 min   Stress: Stress Concern Present (10/13/2020)    [employer] [place] [employer] - Occupational Stress Questionnaire     Feeling of Stress : To some extent   Social Connections: Socially Isolated (10/13/2020)    Social Connection and Isolation Panel [NHANES]     Frequency of Communication with Friends and Family: Once a week     Frequency of Social Gatherings with Friends and Family: Once a week     Attends Sabianist Services: Never     Active Member of Clubs or Organizations: No     Attends Club or Organization Meetings: Never     Marital Status:    Interpersonal Safety: Low Risk  (2024)    Interpersonal Safety     Do you feel physically and emotionally safe where you currently live?: Yes     Within the past 12 months, have you been hit, slapped, kicked or otherwise physically hurt by someone?: No     Within the past 12 months, have you been humiliated or emotionally abused in other ways by your partner or ex-partner?: No   Housing Stability: Low Risk  (2024)    Housing Stability     Do you have housing? : Yes     Are you worried about losing your housing?: No     Family History   Problem Relation Age of Onset    Skin Cancer Mother     Cancer Mother         mastectomy    Diabetes Mother          3/2016    Cerebrovascular Disease Mother         Passed away in Feb of this year, 80 years old.    Thyroid Disease Mother     Depression Mother      "Breast Cancer Mother     Obesity Mother         280 pounds  from this and complications from D    Pancreatic Cancer Father 60    Prostate Cancer Father         Currently in Remission    Macular Degeneration Father     Glaucoma Father     Skin Cancer Father     Coronary Artery Disease Father         Started in his 70's  at 88 in 2023    Colon Cancer Father     Thyroid Disease Sister     Depression Sister     Asthma Sister     Sleep Apnea Brother     Colorectal Cancer Maternal Grandmother     Cancer Maternal Grandmother     Substance Abuse Maternal Grandmother         Alcohol    Prostate Cancer Maternal Grandfather     Substance Abuse Maternal Grandfather         Alcohol    Colorectal Cancer Paternal Grandmother     Asthma Sister         Had since birth    Liver Disease No family hx of     Melanoma No family hx of     Anesthesia Reaction No family hx of     Deep Vein Thrombosis (DVT) No family hx of            Lab Results   Component Value Date    A1C 5.6 2024    A1C 5.6 2024    A1C 5.6 2024    A1C 5.7 2023    A1C 6.3 2023    A1C 6.9 2022    A1C 6.0 2020    A1C 6.3 2020    A1C 6.6 2018    A1C 6.5 2017    A1C 7.8 10/25/2016     Lab Results   Component Value Date    WBC 6.4 2024    WBC 4.4 2021     Lab Results   Component Value Date    RBC 3.09 2024    RBC 2.35 2021     Lab Results   Component Value Date    HGB 9.2 2024    HGB 7.3 2021     Lab Results   Component Value Date    HCT 30.4 2024    HCT 22.6 2021     No components found for: \"MCT\"  Lab Results   Component Value Date    MCV 98 2024    MCV 96 2021     Lab Results   Component Value Date    MCH 29.8 2024    MCH 31.1 2021     Lab Results   Component Value Date    MCHC 30.3 2024    MCHC 32.3 2021     Lab Results   Component Value Date    RDW 15.7 2024    RDW 15.1 2021     Lab Results   Component " "Value Date     12/18/2024     06/28/2021     Last Comprehensive Metabolic Panel:  Lab Results   Component Value Date     12/18/2024    POTASSIUM 5.6 (H) 12/18/2024    CHLORIDE 105 12/18/2024    CO2 21 (L) 12/18/2024    ANIONGAP 14 12/18/2024     (H) 12/18/2024    BUN 37.2 (H) 12/18/2024    CR 1.68 (H) 12/18/2024    GFRESTIMATED 46 (L) 12/18/2024    DEBORAH 8.2 (L) 12/18/2024     Lab Results   Component Value Date    AST 29 12/18/2024    AST 9 06/28/2021     Lab Results   Component Value Date    ALT 26 12/18/2024    ALT 20 06/28/2021     No results found for: \"BILICONJ\"   Lab Results   Component Value Date    BILITOTAL 0.3 12/18/2024    BILITOTAL 0.5 06/28/2021     Lab Results   Component Value Date    ALBUMIN 4.1 12/18/2024    ALBUMIN 4.3 07/20/2022    ALBUMIN 4.0 06/28/2021     Lab Results   Component Value Date    PROTTOTAL 7.0 12/18/2024    PROTTOTAL 7.4 06/28/2021      Lab Results   Component Value Date    ALKPHOS 93 12/18/2024    ALKPHOS 98 06/28/2021         Subjective findings- 59-year-old returns clinic for Diabetes with peripheral Neuropathy and foot deformity and callus.  Relates he got his new Diabetic shoes and those are good.  Relates he is doing about 9200 steps a day.  Relates he is working out 2-3 times a week as well with weights.  Relates to no injuries, no ulcers, no sores since we seen him last.  Relates the calluses buildup and hurt at times.  Relates he needs his toenails cut.  Relates he has numbness tingling and neuropathy in his feet.    Objective findings- DP and PT are 2 out of 4 bilaterally.  Has decreased hair growth bilaterally.  Has dorsally contracted digits 2 through 5 bilaterally.  Has functional hallux limitus bilaterally.  Has nucleated hyperkeratotic tissue buildup plantar first and fifth MPJ bilaterally, plantar medial hallux bilaterally, plantar fourth MPJ right, plantar 2 through 4 MPJs left with mild pain on palpation.  Sharp/dull is decreased with 5.07 " [place] monofilament on 2 spots on the left foot and intact on the right foot.  Proprioception is intact bilaterally.  Deep tendon reflexes are intact bilaterally.  No tendon voids bilaterally.  There is no erythema, no edema, no drainage, no odor, no calor bilaterally.    Assessment and plan- Onychauxis with Onychocryptosis bilaterally.  Diabetes with peripheral Neuropathy.  Hammertoes and functional Hallux Limitus bilaterally.  Tylomas bilaterally causing pain.  Diagnosis and treatment options discussed with the patient.  All the toenails were reduced bilaterally upon consent.  All the Tylomas bilaterally were sharp debrided with a 10 blade upon consent, 5+ lesions were debrided.  Continue the diabetic shoes.  Previous notes reviewed.  Return to clinic and see me in 2 to 3 months.                                  Moderate level of medical decision making.

## 2025-01-28 RX ORDER — METOPROLOL SUCCINATE 25 MG/1
25 TABLET, EXTENDED RELEASE ORAL DAILY
Qty: 30 TABLET | Refills: 3 | Status: SHIPPED | OUTPATIENT
Start: 2025-01-28

## 2025-01-31 DIAGNOSIS — L73.9 FOLLICULITIS: ICD-10-CM

## 2025-01-31 DIAGNOSIS — Z79.4 TYPE 2 DIABETES MELLITUS WITHOUT COMPLICATION, WITH LONG-TERM CURRENT USE OF INSULIN (H): Primary | ICD-10-CM

## 2025-01-31 DIAGNOSIS — E11.9 TYPE 2 DIABETES MELLITUS WITHOUT COMPLICATION, WITH LONG-TERM CURRENT USE OF INSULIN (H): Primary | ICD-10-CM

## 2025-02-03 DIAGNOSIS — C78.6 MALIGNANT NEOPLASM METASTATIC TO PERITONEUM (H): ICD-10-CM

## 2025-02-03 DIAGNOSIS — C22.0 HCC (HEPATOCELLULAR CARCINOMA) (H): Primary | ICD-10-CM

## 2025-02-03 DIAGNOSIS — Z94.4 STATUS POST LIVER TRANSPLANTATION (H): ICD-10-CM

## 2025-02-03 DIAGNOSIS — C22.0 HCC (HEPATOCELLULAR CARCINOMA) (H): ICD-10-CM

## 2025-02-03 RX ORDER — SORAFENIB 200 MG/1
200 TABLET, FILM COATED ORAL 2 TIMES DAILY
Qty: 60 TABLET | Refills: 0 | Status: SHIPPED | OUTPATIENT
Start: 2025-02-07

## 2025-02-03 RX ORDER — DIPHENOXYLATE HYDROCHLORIDE AND ATROPINE SULFATE 2.5; .025 MG/1; MG/1
2 TABLET ORAL 4 TIMES DAILY PRN
Qty: 60 TABLET | Refills: 0 | Status: SHIPPED | OUTPATIENT
Start: 2025-02-03

## 2025-02-05 RX ORDER — PEN NEEDLE, DIABETIC 32GX 5/32"
NEEDLE, DISPOSABLE MISCELLANEOUS
Qty: 300 EACH | Refills: 3 | Status: SHIPPED | OUTPATIENT
Start: 2025-02-05

## 2025-02-15 DIAGNOSIS — Z94.4 STATUS POST LIVER TRANSPLANTATION (H): ICD-10-CM

## 2025-02-17 ENCOUNTER — MYC REFILL (OUTPATIENT)
Dept: FAMILY MEDICINE | Facility: CLINIC | Age: 60
End: 2025-02-17
Payer: MEDICARE

## 2025-02-17 ENCOUNTER — NURSE TRIAGE (OUTPATIENT)
Dept: ONCOLOGY | Facility: CLINIC | Age: 60
End: 2025-02-17
Payer: MEDICARE

## 2025-02-17 DIAGNOSIS — C78.6 MALIGNANT NEOPLASM METASTATIC TO PERITONEUM (H): ICD-10-CM

## 2025-02-17 DIAGNOSIS — R05.9 COUGH, UNSPECIFIED TYPE: ICD-10-CM

## 2025-02-17 DIAGNOSIS — D63.1 ANEMIA OF CHRONIC RENAL FAILURE, STAGE 3B (H): ICD-10-CM

## 2025-02-17 DIAGNOSIS — Z94.4 LIVER TRANSPLANT RECIPIENT (H): ICD-10-CM

## 2025-02-17 DIAGNOSIS — N18.32 ANEMIA OF CHRONIC RENAL FAILURE, STAGE 3B (H): ICD-10-CM

## 2025-02-17 DIAGNOSIS — I82.452 ACUTE DEEP VEIN THROMBOSIS (DVT) OF LEFT PERONEAL VEIN (H): ICD-10-CM

## 2025-02-17 DIAGNOSIS — E11.3293 TYPE 2 DIABETES MELLITUS WITH MILD NONPROLIFERATIVE RETINOPATHY OF BOTH EYES WITHOUT MACULAR EDEMA, UNSPECIFIED WHETHER LONG TERM INSULIN USE (H): ICD-10-CM

## 2025-02-17 DIAGNOSIS — C22.0 HCC (HEPATOCELLULAR CARCINOMA) (H): ICD-10-CM

## 2025-02-17 DIAGNOSIS — N18.32 STAGE 3B CHRONIC KIDNEY DISEASE (H): ICD-10-CM

## 2025-02-17 RX ORDER — MYCOPHENOLATE MOFETIL 250 MG/1
CAPSULE ORAL
Qty: 120 CAPSULE | Refills: 11 | Status: SHIPPED | OUTPATIENT
Start: 2025-02-17

## 2025-02-17 NOTE — TELEPHONE ENCOUNTER
Medication requested: loperamide 2 mg capsule    Last prescribing provider: Miguel Villarreal MD on 1/31/25    Last clinic visit date: 12/11/24 with Alisha Breen CNP    Recommendations for requested medication (if none, N/A): Please refill loperamide. Please write the RX so that he can take up to 8 capsules per day.     Any other pertinent information (if none, N/A): NA    Refilled: Y/N, if NO, why?    Pended and Routed to Dr. Rissa Ramesh

## 2025-02-17 NOTE — TELEPHONE ENCOUNTER
Pt is calling for two reasons:  He feels he may have RSV again, but his concerns are:    Diarrhea:   Having 5-6 stools per day, large, includes a lot of liquid, large stools.  Whatever he eats goes right out.  Feels dehydrated (has cotton mouth) despite taking in 150-170 oz of clear fluids per day.  He has run out of loperamide. See below.  Does not want to take lomotil because that causes drowsiness.  Wants to know if okay to come in for IVF?    Loperamide:  Ran out of this RX and pharmacy won't fill it as written.  He is taking 2 capsules after first episode of diarrhea and then one capsule after each subsequent episode up to 8 capsules per day.  Pharmacy won't let him get a refill because the last one was for 90 days.   He would like Dr. Villarreal to send a RX to [address] [place] [address] for loperamide with instructions that say: Take 2 capsules after first episode of diarrhea and then one capsule after each subsequent episode up to 8 capsules per day.    Informed caller that this writer would send to Care Team to advise and this writer will call him back. Pt voiced understanding.  Routed to Care Team.    Per Dr. Ramesh, she will send RX for loperamide for pt to get up to 8 capsules/day.   Pended and routed to Dr. Ramesh.  Ok for pt to get 1 liter IVF tomorrow.  [facility] can take pt at 1100 and 1130.  Pt cannot come until at least 1330.  Spoke with BMT charge nurse. They are full at 1330; they can offer 2:30 w/labs prior at 1400.   Spoke with Miller who confirmed he can come tomorrow at that time.  Message sent to [facility]OD to schedule.

## 2025-02-18 ENCOUNTER — INFUSION THERAPY VISIT (OUTPATIENT)
Dept: TRANSPLANT | Facility: CLINIC | Age: 60
End: 2025-02-18
Attending: INTERNAL MEDICINE
Payer: MEDICARE

## 2025-02-18 VITALS
OXYGEN SATURATION: 100 % | WEIGHT: 171.7 LBS | DIASTOLIC BLOOD PRESSURE: 67 MMHG | RESPIRATION RATE: 16 BRPM | HEART RATE: 82 BPM | TEMPERATURE: 97.4 F | BODY MASS INDEX: 25.36 KG/M2 | SYSTOLIC BLOOD PRESSURE: 124 MMHG

## 2025-02-18 DIAGNOSIS — C78.6 MALIGNANT NEOPLASM METASTATIC TO PERITONEUM (H): ICD-10-CM

## 2025-02-18 DIAGNOSIS — Z94.4 LIVER REPLACED BY TRANSPLANT (H): ICD-10-CM

## 2025-02-18 DIAGNOSIS — D63.1 ANEMIA OF CHRONIC RENAL FAILURE, STAGE 3B (H): Primary | ICD-10-CM

## 2025-02-18 DIAGNOSIS — C22.0 HCC (HEPATOCELLULAR CARCINOMA) (H): ICD-10-CM

## 2025-02-18 DIAGNOSIS — N18.32 ANEMIA OF CHRONIC RENAL FAILURE, STAGE 3B (H): Primary | ICD-10-CM

## 2025-02-18 LAB
ALBUMIN SERPL BCG-MCNC: 4.1 G/DL (ref 3.5–5.2)
ALP SERPL-CCNC: 83 U/L (ref 40–150)
ALT SERPL W P-5'-P-CCNC: 28 U/L (ref 0–70)
ANION GAP SERPL CALCULATED.3IONS-SCNC: 15 MMOL/L (ref 7–15)
AST SERPL W P-5'-P-CCNC: 29 U/L (ref 0–45)
BASOPHILS # BLD AUTO: 0 10E3/UL (ref 0–0.2)
BASOPHILS NFR BLD AUTO: 0 %
BILIRUB DIRECT SERPL-MCNC: 0.13 MG/DL (ref 0–0.3)
BILIRUB SERPL-MCNC: 0.3 MG/DL
BUN SERPL-MCNC: 63 MG/DL (ref 8–23)
CALCIUM SERPL-MCNC: 7.5 MG/DL (ref 8.8–10.4)
CHLORIDE SERPL-SCNC: 101 MMOL/L (ref 98–107)
CREAT SERPL-MCNC: 2.35 MG/DL (ref 0.67–1.17)
EGFRCR SERPLBLD CKD-EPI 2021: 31 ML/MIN/1.73M2
EOSINOPHIL # BLD AUTO: 0.1 10E3/UL (ref 0–0.7)
EOSINOPHIL NFR BLD AUTO: 1 %
ERYTHROCYTE [DISTWIDTH] IN BLOOD BY AUTOMATED COUNT: 15 % (ref 10–15)
GLUCOSE SERPL-MCNC: 116 MG/DL (ref 70–99)
HCO3 SERPL-SCNC: 21 MMOL/L (ref 22–29)
HCT VFR BLD AUTO: 30.3 % (ref 40–53)
HGB BLD-MCNC: 9.4 G/DL (ref 13.3–17.7)
IMM GRANULOCYTES # BLD: 0 10E3/UL
IMM GRANULOCYTES NFR BLD: 0 %
LYMPHOCYTES # BLD AUTO: 0.6 10E3/UL (ref 0.8–5.3)
LYMPHOCYTES NFR BLD AUTO: 9 %
MAGNESIUM SERPL-MCNC: 1.6 MG/DL (ref 1.7–2.3)
MCH RBC QN AUTO: 30.3 PG (ref 26.5–33)
MCHC RBC AUTO-ENTMCNC: 31 G/DL (ref 31.5–36.5)
MCV RBC AUTO: 98 FL (ref 78–100)
MONOCYTES # BLD AUTO: 0.3 10E3/UL (ref 0–1.3)
MONOCYTES NFR BLD AUTO: 5 %
NEUTROPHILS # BLD AUTO: 5.2 10E3/UL (ref 1.6–8.3)
NEUTROPHILS NFR BLD AUTO: 84 %
NRBC # BLD AUTO: 0 10E3/UL
NRBC BLD AUTO-RTO: 0 /100
PHOSPHATE SERPL-MCNC: 5.7 MG/DL (ref 2.5–4.5)
PLATELET # BLD AUTO: 177 10E3/UL (ref 150–450)
POTASSIUM SERPL-SCNC: 4.8 MMOL/L (ref 3.4–5.3)
PROT SERPL-MCNC: 7.2 G/DL (ref 6.4–8.3)
RBC # BLD AUTO: 3.1 10E6/UL (ref 4.4–5.9)
SODIUM SERPL-SCNC: 137 MMOL/L (ref 135–145)
WBC # BLD AUTO: 6.1 10E3/UL (ref 4–11)

## 2025-02-18 PROCEDURE — 84132 ASSAY OF SERUM POTASSIUM: CPT

## 2025-02-18 PROCEDURE — 85025 COMPLETE CBC W/AUTO DIFF WBC: CPT

## 2025-02-18 PROCEDURE — 258N000003 HC RX IP 258 OP 636: Performed by: INTERNAL MEDICINE

## 2025-02-18 PROCEDURE — 83735 ASSAY OF MAGNESIUM: CPT

## 2025-02-18 PROCEDURE — 84100 ASSAY OF PHOSPHORUS: CPT

## 2025-02-18 PROCEDURE — 82248 BILIRUBIN DIRECT: CPT

## 2025-02-18 PROCEDURE — 36415 COLL VENOUS BLD VENIPUNCTURE: CPT

## 2025-02-18 RX ORDER — DIPHENHYDRAMINE HYDROCHLORIDE 50 MG/ML
50 INJECTION INTRAMUSCULAR; INTRAVENOUS
Start: 2025-02-18

## 2025-02-18 RX ORDER — HEPARIN SODIUM (PORCINE) LOCK FLUSH IV SOLN 100 UNIT/ML 100 UNIT/ML
5 SOLUTION INTRAVENOUS
OUTPATIENT
Start: 2025-02-18

## 2025-02-18 RX ORDER — ALBUTEROL SULFATE 90 UG/1
1-2 INHALANT RESPIRATORY (INHALATION)
Start: 2025-02-18

## 2025-02-18 RX ORDER — METHYLPREDNISOLONE SODIUM SUCCINATE 125 MG/2ML
125 INJECTION INTRAMUSCULAR; INTRAVENOUS
Start: 2025-02-18

## 2025-02-18 RX ORDER — MEPERIDINE HYDROCHLORIDE 25 MG/ML
25 INJECTION INTRAMUSCULAR; INTRAVENOUS; SUBCUTANEOUS EVERY 30 MIN PRN
OUTPATIENT
Start: 2025-02-18

## 2025-02-18 RX ORDER — ALBUTEROL SULFATE 90 UG/1
2 INHALANT RESPIRATORY (INHALATION) EVERY 4 HOURS PRN
Qty: 18 G | Refills: 3 | Status: SHIPPED | OUTPATIENT
Start: 2025-02-18

## 2025-02-18 RX ORDER — ALBUTEROL SULFATE 0.83 MG/ML
2.5 SOLUTION RESPIRATORY (INHALATION)
OUTPATIENT
Start: 2025-02-18

## 2025-02-18 RX ORDER — HEPARIN SODIUM,PORCINE 10 UNIT/ML
5-20 VIAL (ML) INTRAVENOUS DAILY PRN
OUTPATIENT
Start: 2025-02-18

## 2025-02-18 RX ORDER — EPINEPHRINE 1 MG/ML
0.3 INJECTION, SOLUTION INTRAMUSCULAR; SUBCUTANEOUS EVERY 5 MIN PRN
OUTPATIENT
Start: 2025-02-18

## 2025-02-18 RX ORDER — LOPERAMIDE HYDROCHLORIDE 2 MG/1
2 CAPSULE ORAL 4 TIMES DAILY PRN
Qty: 120 CAPSULE | Refills: 3 | Status: SHIPPED | OUTPATIENT
Start: 2025-02-18

## 2025-02-18 RX ADMIN — SODIUM CHLORIDE 1000 ML: 9 INJECTION, SOLUTION INTRAVENOUS at 14:43

## 2025-02-18 ASSESSMENT — PAIN SCALES - GENERAL: PAINLEVEL_OUTOF10: NO PAIN (0)

## 2025-02-18 NOTE — PROGRESS NOTES
Infusion Nursing Note:  Frandy Workman presents today for scheduled IVF.    Patient seen by provider today: No   present during visit today: Not Applicable.    Note: Patient presents stating he feels generally well; c/o fatigue, mild cough, and on-going diarrhea; recently seen in primary care for respiratory symptoms which he feels are improving. Lab results monitored; Cr 2.35, Dr. Villarreal and Dr. Soares consulted via epic secure chat, and did not recommend any other interventions at this time. 1L NS bolus given over 1 hour per therapy plan.    Intravenous Access:  Peripheral IV placed.  +BR noted pre and post infusion  Removed prior to discharge    Treatment Conditions:  Lab Results   Component Value Date    HGB 9.4 (L) 02/18/2025    WBC 6.1 02/18/2025    ANEU 3.2 03/26/2021    ANEUTAUTO 5.2 02/18/2025     02/18/2025        Lab Results   Component Value Date     02/18/2025    POTASSIUM 4.8 02/18/2025    MAG 1.6 (L) 02/18/2025    CR 2.35 (H) 02/18/2025    DEBORAH 7.5 (L) 02/18/2025    BILITOTAL 0.3 02/18/2025    ALBUMIN 4.1 02/18/2025    ALT 28 02/18/2025    AST 29 02/18/2025         Post Infusion Assessment:  Patient tolerated infusion without incident.       Discharge Plan:   Patient discharged in stable condition accompanied by: self.  Departure Mode: Ambulatory.      Courtney Aiken RN

## 2025-02-18 NOTE — NURSING NOTE
Chief Complaint   Patient presents with    Blood Draw     Labs drawn via PIV placed by RN. VS taken.     Labs drawn from PIV placed by RN. Line flushed with saline. Vitals taken. Pt checked in for appointment(s).     Julia Sandhu RN

## 2025-02-18 NOTE — TELEPHONE ENCOUNTER
Albuterol 108 mcg/act inhaler - 2 puffs into lungs Q4 hours as needed for SOB, wheezing, or cough  Last Clinic Visit: 12/05/2024

## 2025-02-19 ENCOUNTER — TELEPHONE (OUTPATIENT)
Dept: TRANSPLANT | Facility: CLINIC | Age: 60
End: 2025-02-19
Payer: MEDICARE

## 2025-02-19 DIAGNOSIS — Z94.4 STATUS POST LIVER TRANSPLANTATION (H): ICD-10-CM

## 2025-02-19 DIAGNOSIS — Z94.4 LIVER TRANSPLANT RECIPIENT (H): ICD-10-CM

## 2025-02-19 DIAGNOSIS — E11.29 TYPE 2 DIABETES MELLITUS WITH MICROALBUMINURIA, WITH LONG-TERM CURRENT USE OF INSULIN (H): ICD-10-CM

## 2025-02-19 DIAGNOSIS — C78.6 MALIGNANT NEOPLASM METASTATIC TO PERITONEUM (H): ICD-10-CM

## 2025-02-19 DIAGNOSIS — I82.452 ACUTE DEEP VEIN THROMBOSIS (DVT) OF LEFT PERONEAL VEIN (H): ICD-10-CM

## 2025-02-19 DIAGNOSIS — C22.0 HCC (HEPATOCELLULAR CARCINOMA) (H): ICD-10-CM

## 2025-02-19 DIAGNOSIS — Z94.4 LIVER REPLACED BY TRANSPLANT (H): Primary | ICD-10-CM

## 2025-02-19 DIAGNOSIS — R80.9 TYPE 2 DIABETES MELLITUS WITH MICROALBUMINURIA, WITH LONG-TERM CURRENT USE OF INSULIN (H): ICD-10-CM

## 2025-02-19 DIAGNOSIS — Z79.4 TYPE 2 DIABETES MELLITUS WITH MICROALBUMINURIA, WITH LONG-TERM CURRENT USE OF INSULIN (H): ICD-10-CM

## 2025-02-19 RX ORDER — PANTOPRAZOLE SODIUM 40 MG/1
40 TABLET, DELAYED RELEASE ORAL
Qty: 90 TABLET | Refills: 3 | Status: SHIPPED | OUTPATIENT
Start: 2025-02-19

## 2025-02-19 RX ORDER — EMPAGLIFLOZIN 10 MG/1
10 TABLET, FILM COATED ORAL DAILY
Qty: 30 TABLET | Refills: 11 | Status: SHIPPED | OUTPATIENT
Start: 2025-02-19

## 2025-02-19 NOTE — TELEPHONE ENCOUNTER
Patient believes he has RSV; complains of difficulty breathing, congestion, and a persistent cough. PCP is managing symptoms.      Patient states he was out of Lomotil and Loperamide and had worsening diarrhea. Denies vomiting. Patient restarted his medications yesterday.     Patient received IV fluids yesterday and has increased his fluid intake. Patient will continue to push fluids and get labs on Friday.

## 2025-02-19 NOTE — TELEPHONE ENCOUNTER
----- Message from Cecilia Amaya sent at 2/18/2025  5:23 PM CST -----  Patient with acute kidney injury. Call pt to discuss intake and any sx. Please repeat BMP in 3 days.  Recommend urine analysis with reflex to urine culture and urine protein.

## 2025-02-19 NOTE — TELEPHONE ENCOUNTER
Pending Prescriptions:                       Disp   Refills    ELIQUIS ANTICOAGULANT 5 MG tablet [Pharma*60 tab*3            Sig: TAKE 1 TABLET BY MOUTH TWICE A DAY    diphenoxylate-atropine (LOMOTIL) 2.5-0.02*60 tab*             Sig: TAKE 2 TABLETS BY MOUTH FOUR TIMES A DAY AS           NEEDED FOR DIARRHEA    Last prescribing provider:     Last clinic visit date: 12/11/24 w/Alisha Breen    Recommendations for requested medication (if none, N/A): N/A    Any other pertinent information (if none, N/A): N/A    Refilled: Y/N, if NO, why?

## 2025-02-21 ENCOUNTER — LAB (OUTPATIENT)
Dept: LAB | Facility: CLINIC | Age: 60
End: 2025-02-21
Payer: MEDICARE

## 2025-02-21 ENCOUNTER — MYC MEDICAL ADVICE (OUTPATIENT)
Dept: SLEEP MEDICINE | Facility: CLINIC | Age: 60
End: 2025-02-21

## 2025-02-21 DIAGNOSIS — Z94.4 LIVER REPLACED BY TRANSPLANT (H): ICD-10-CM

## 2025-02-21 LAB
ALBUMIN MFR UR ELPH: 127 MG/DL
ALBUMIN UR-MCNC: 100 MG/DL
ANION GAP SERPL CALCULATED.3IONS-SCNC: 12 MMOL/L (ref 7–15)
APPEARANCE UR: CLEAR
BILIRUB UR QL STRIP: NEGATIVE
BUN SERPL-MCNC: 43.1 MG/DL (ref 8–23)
CALCIUM SERPL-MCNC: 7.8 MG/DL (ref 8.8–10.4)
CHLORIDE SERPL-SCNC: 101 MMOL/L (ref 98–107)
COLOR UR AUTO: ABNORMAL
CREAT SERPL-MCNC: 1.98 MG/DL (ref 0.67–1.17)
CREAT UR-MCNC: 53.5 MG/DL
EGFRCR SERPLBLD CKD-EPI 2021: 38 ML/MIN/1.73M2
GLUCOSE SERPL-MCNC: 136 MG/DL (ref 70–99)
GLUCOSE UR STRIP-MCNC: 500 MG/DL
HCO3 SERPL-SCNC: 23 MMOL/L (ref 22–29)
HGB UR QL STRIP: NEGATIVE
KETONES UR STRIP-MCNC: NEGATIVE MG/DL
LEUKOCYTE ESTERASE UR QL STRIP: NEGATIVE
NITRATE UR QL: NEGATIVE
PH UR STRIP: 6 [PH] (ref 5–7)
POTASSIUM SERPL-SCNC: 5.4 MMOL/L (ref 3.4–5.3)
PROT/CREAT 24H UR: 2.37 MG/MG CR (ref 0–0.2)
RBC URINE: <1 /HPF
SODIUM SERPL-SCNC: 136 MMOL/L (ref 135–145)
SP GR UR STRIP: 1.02 (ref 1–1.03)
UROBILINOGEN UR STRIP-MCNC: NORMAL MG/DL
WBC URINE: <1 /HPF

## 2025-02-21 PROCEDURE — 36415 COLL VENOUS BLD VENIPUNCTURE: CPT | Performed by: PATHOLOGY

## 2025-02-21 PROCEDURE — 80048 BASIC METABOLIC PNL TOTAL CA: CPT | Performed by: PATHOLOGY

## 2025-02-21 PROCEDURE — 81001 URINALYSIS AUTO W/SCOPE: CPT | Performed by: PATHOLOGY

## 2025-02-21 PROCEDURE — 84156 ASSAY OF PROTEIN URINE: CPT | Performed by: PATHOLOGY

## 2025-02-24 DIAGNOSIS — C22.0 HCC (HEPATOCELLULAR CARCINOMA) (H): Primary | ICD-10-CM

## 2025-02-24 DIAGNOSIS — C78.6 MALIGNANT NEOPLASM METASTATIC TO PERITONEUM (H): ICD-10-CM

## 2025-02-24 RX ORDER — DIPHENOXYLATE HYDROCHLORIDE AND ATROPINE SULFATE 2.5; .025 MG/1; MG/1
TABLET ORAL
Qty: 60 TABLET | Refills: 3 | Status: SHIPPED | OUTPATIENT
Start: 2025-02-24

## 2025-02-24 RX ORDER — APIXABAN 5 MG/1
5 TABLET, FILM COATED ORAL 2 TIMES DAILY
Qty: 60 TABLET | Refills: 3 | Status: SHIPPED | OUTPATIENT
Start: 2025-02-24

## 2025-02-25 DIAGNOSIS — Z94.4 STATUS POST LIVER TRANSPLANTATION (H): ICD-10-CM

## 2025-02-25 RX ORDER — PREDNISONE 5 MG/1
5 TABLET ORAL DAILY
Qty: 30 TABLET | Refills: 11 | OUTPATIENT
Start: 2025-02-25

## 2025-02-26 DIAGNOSIS — E11.3593 PROLIFERATIVE DIABETIC RETINOPATHY OF BOTH EYES ASSOCIATED WITH TYPE 2 DIABETES MELLITUS, UNSPECIFIED PROLIFERATIVE RETINOPATHY TYPE (H): Primary | ICD-10-CM

## 2025-02-26 PROCEDURE — 2022F DILAT RTA XM EVC RTNOPTHY: CPT | Performed by: OPHTHALMOLOGY

## 2025-03-03 DIAGNOSIS — C22.0 HCC (HEPATOCELLULAR CARCINOMA) (H): Primary | ICD-10-CM

## 2025-03-03 DIAGNOSIS — C78.6 MALIGNANT NEOPLASM METASTATIC TO PERITONEUM (H): ICD-10-CM

## 2025-03-03 RX ORDER — SORAFENIB 200 MG/1
200 TABLET, FILM COATED ORAL 2 TIMES DAILY
Qty: 60 TABLET | Refills: 0 | Status: SHIPPED | OUTPATIENT
Start: 2025-03-09

## 2025-03-05 ENCOUNTER — OFFICE VISIT (OUTPATIENT)
Dept: OPHTHALMOLOGY | Facility: CLINIC | Age: 60
End: 2025-03-05
Attending: OPHTHALMOLOGY
Payer: MEDICARE

## 2025-03-05 DIAGNOSIS — E11.3513 PROLIFERATIVE DIABETIC RETINOPATHY OF BOTH EYES WITH MACULAR EDEMA ASSOCIATED WITH TYPE 2 DIABETES MELLITUS (H): Primary | ICD-10-CM

## 2025-03-05 PROCEDURE — 2022F DILAT RTA XM EVC RTNOPTHY: CPT | Performed by: OPHTHALMOLOGY

## 2025-03-05 NOTE — PROGRESS NOTES
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  Past medical history includes CAD status post CABG 7/2021, type II diabetes, HTN, and CKD.     #. 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 Hx of HBcAb positive donor   Patient with normal AST, ALT, alkaline phosphatase and total bilirubin -this suggests intact graft synthetic function.  Patient on mycophenolate and prednisone given the concern for calcineurin inhibitor induced nephrotoxicity    #. HCC recurrence post transplant  Followed by Dr. Michael Villarreal  Currently on sorafenib.  Imaging 12/2024 without evidence of active disease    #. 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  The patient's serum creatinine improved the following week upon retest.  The patient has an estimated GFR of 38    Plan  -- Continue mycophenolate 500 mg twice daily and prednisone 5 milligrams daily; suppression monitoring per protocol  -- Continue lamivudine given hepatitis B core Ab positive donor  -- HCC management per oncology    Health Maintenance:  - Colonoscopy: due 2029  - Skin Checks: due yearly. SPF35+ at all times  - Lipids: due yearly   - Vaccinations: recommend vaccinations per guidelines, including influenza vaccination yearly    RTC: 12 months    Cecilia Amaya MD (Lizzie)  Advanced & Transplant Hepatology  [facility]    I spent 60 minutes on this encounter performing the following: reviewing the patient's medical record (clinic visits, hospital records, lab results, imaging and procedural documentation), history taking, physical exam and documentation on the date of the encounter. I also spent part of the time in coordination of care and counseling.    The longitudinal  plan of care for the diagnosis(es)/condition(s) as documented were addressed during this visit. Due to the added complexity in care, I will continue to support Miller in the subsequent management and with ongoing continuity of care.    HPI:  MASLD + HCC s/p DBD liver transplant 11/11/2019  - mikaela-op MELD= 12  - donor- donor age 63/local//ECD  - Donor: hep B core positive. CMV D+/R-; EBV D+/R+  - operation- CiT 7:39, EBL 2L, piggyback IVC, duct-to-duct biliary anastomosis  - Explant: moderately differentiated HCC with 5-10% residual/recurrence. Necrotic segment 4 HCC. No evidence of vascular invasion  - post-op course  HCC recurrence: 7/13/2203  MMF colitis Dec 2019  CNI renal toxicity  Skin cancer: SCC in situ (12/2022)  Rejection: none  Biliary issues: none  - immunosuppression- MMF 500mg Q12, pred 5   * Switched from FK --> MMF + prednisone 2021     HCC  - dx Dec 2018  - MRI liver 12/23/18- 3.1cm lesion segment IVB, 1.1cm lesion segment III  - TACE 1/22/19 (seg IV)  - Microwave ablation 1/23/19 (seg III)  - Explant: moderately differentiated HCC with 5-10% residual/recurrence. Necrotic segment 4 HCC. No evidence of vascular invasion  - 7/13/2023: Peritoneal recurrence.  HCC poorly differentiated. AFP 26.7   - 7/20/2023: Started on sorafenib    Patient presented alone.     He contracted RSV in October 2023.  He reports that since then he feels as though he has not recovered and has had recurrent viral infections.  He reports that he has recently had issues with nasal and chest congestion as well as coughing but this is slowly improving.  He follows with his primary care doctor for this.    Yesterday he was able to walk 12,000 steps.  He lifts and does calisthenics every other day.     He reports that since starting sorafenib he has had issues with loose stools.  He has 2-3 bowel movements per day.  No blood or mucus.  He takes about 8 Lomotil pills throughout the day with unclear benefit.    Typically eats about 2  meals per day.    No alcohol since 1996.  No chewing tobacco since 2009.    Medical hx Surgical hx   Past Medical History:   Diagnosis Date    Anemia 2013    Arthritis     BPH (benign prostatic hyperplasia)     CAD (coronary artery disease) 04/01/2019    Cholelithiasis     Conductive hearing loss 08/16/2017    Depressive disorder 1986    Suffer effects throughout life    Gastroesophageal reflux disease 12/01/2014    HCC (hepatocellular carcinoma) (H) 01/22/2019    History of blood transfusion 2019    Had blood transfussion until Dec 2022    History of diabetic retinopathy 07/2018    HTN (hypertension) 11/20/2019    Hyperlipidemia     Liver cirrhosis secondary to ESTRADA (H)     Liver transplanted (H) 11/11/2019    Portal vein thrombosis 04/11/2019    SCC (squamous cell carcinoma) 02/15/2023    02/15/23:  Left temporal scalp    Type II diabetes mellitus (H)       Past Surgical History:   Procedure Laterality Date    ABDOMEN SURGERY  11/11/2019    Had Liver Transplant    BIOPSY  12/2022    Had biopsy done will have additional procedure 2/15/2023    BYPASS GRAFT ARTERY CORONARY N/A 07/14/2021    Procedure: median sternotomy, on cardiopulmonary bypass, CORONARY ARTERY BYPASS GRAFT (CABG) x2 with left greater saphenous vein endoscopic harvest and left internal mammery artery harvest;  Surgeon: Tom Zapata MD;  Location:  OR    CARDIAC SURGERY  7/2021    Heart Graph. and bypass surgery    CHOLECYSTECTOMY  11/11/2022    Removed with Liver Transplant    COLONOSCOPY      2015    COLONOSCOPY N/A 12/06/2019    Procedure: COLONOSCOPY, WITH POLYPECTOMY AND BIOPSY;  Surgeon: Adam Morton MD;  Location:  GI    CV CENTRAL VENOUS CATHETER PLACEMENT N/A 07/12/2021    Procedure: Central Venous Catheter Placement;  Surgeon: Fermin Polanco MD;  Location:  HEART CARDIAC CATH LAB    CV CORONARY ANGIOGRAM N/A 07/12/2021    Procedure: Coronary Angiogram;  Surgeon: Fermin Polanco MD;   Location:  HEART CARDIAC CATH LAB    CV HEART CATHETERIZATION WITH POSSIBLE INTERVENTION N/A 02/26/2019    Procedure: CORS;  Surgeon: Jagdish Hoyt MD;  Location:  HEART CARDIAC CATH LAB    CV INTRA AORTIC BALLOON N/A 07/12/2021    Procedure: Intra Aortic Balloon Pump Insertion;  Surgeon: Fermin Polanco MD;  Location:  HEART CARDIAC CATH LAB    ESOPHAGOSCOPY, GASTROSCOPY, DUODENOSCOPY (EGD), COMBINED N/A 11/17/2016    Procedure: COMBINED ESOPHAGOSCOPY, GASTROSCOPY, DUODENOSCOPY (EGD);  Surgeon: Santi Rosas MD;  Location:  GI    ESOPHAGOSCOPY, GASTROSCOPY, DUODENOSCOPY (EGD), COMBINED N/A 11/17/2017    Procedure: COMBINED ESOPHAGOSCOPY, GASTROSCOPY, DUODENOSCOPY (EGD);  EGD;  Surgeon: Santi Rosas MD;  Location:  GI    ESOPHAGOSCOPY, GASTROSCOPY, DUODENOSCOPY (EGD), COMBINED N/A 12/28/2018    Procedure: EGD;  Surgeon: Santi Rosas MD;  Location:  OR    ESOPHAGOSCOPY, GASTROSCOPY, DUODENOSCOPY (EGD), COMBINED N/A 12/06/2019    Procedure: ESOPHAGOGASTRODUODENOSCOPY, WITH BIOPSY;  Surgeon: Adam Morton MD;  Location:  GI    ESOPHAGOSCOPY, GASTROSCOPY, DUODENOSCOPY (EGD), COMBINED N/A 02/13/2020    Procedure: ESOPHAGOGASTRODUODENOSCOPY (EGD);  Surgeon: Santi Rosas MD;  Location:  GI    EXCISE MASS ABDOMEN N/A 07/13/2023    Procedure: Laparoscopic lysis of adhesions, laparoscopic resection of abdominal mass;  Surgeon: Ruiz Chu MD;  Location:  OR    HEAD & NECK SURGERY      12/2017 at [facility].     IMPLANT GOLD WEIGHT EYELID Right 11/16/2017    Procedure: IMPLANT WEIGHT EYELID;  Right Upper Eyelid Weight, right tarsal strip lower eyelid;  Surgeon: Milana Malave MD;  Location: [facility] OR    IR CHEMO EMBOLIZATION  01/22/2019    KNEE SURGERY Left     ORTHOPEDIC SURGERY      PAROTIDECTOMY, RADICAL NECK DISSECTION Right 11/02/2017    Procedure: PAROTIDECTOMY, RADICAL NECK DISSECTION;  Right Superfacial Parotidectomy , Facial  nerve repair. with [facility] facial nerve monitor.;  Surgeon: Asiya Morgan MD;  Location: [facility] OR    PHACOEMULSIFICATION CLEAR CORNEA WITH STANDARD INTRAOCULAR LENS IMPLANT Right 2023    Procedure: PHACOEMULSIFICATION, COMPLEX CATARACT, WITH INTRAOCULAR LENS IMPLANT WITH TRYPAN RIGHT EYE;  Surgeon: Enriqueta Martin MD;  Location: [facility] OR    PHACOEMULSIFICATION WITH STANDARD INTRAOCULAR LENS IMPLANT Left 2023    Procedure: PHACOEMULSIFICATION, COMPLEX CATARACT, WITH STANDARD INTRAOCULAR LENS IMPLANT INSERTION LEFT EYE;  Surgeon: Enriqueta Martin MD;  Location: [facility] OR    PICC INSERTION Left 2017    4fr SL BioFlo PICC, 44cm in the L basilic vein w/ tip in the low SVC    RETURN LIVER TRANSPLANT N/A 2019    Procedure: Exploratory laparotomy, hematoma evacuation, abdominal washout;  Surgeon: Александр Ramos MD;  Location: [facility] OR    TRANSPLANT LIVER RECIPIENT  DONOR N/A 2019    Procedure: TRANSPLANT, LIVER, RECIPIENT,  DONOR;  Surgeon: Александр Ramos MD;  Location: [facility] OR    VASCULAR SURGERY            Medications  Current Outpatient Medications   Medication Sig Dispense Refill    lamiVUDine (EPIVIR) 100 MG tablet Take 1 tablet (100 mg) by mouth daily. 90 tablet 2    mycophenolate (GENERIC EQUIVALENT) 250 MG capsule Take 2 capsules (500 mg) by mouth 2 times daily. 120 capsule 11    predniSONE (DELTASONE) 5 MG tablet Take 1 tablet (5 mg) by mouth daily. 30 tablet 11    acetaminophen (TYLENOL) 325 MG tablet TAKE 1 TABLET BY MOUTH EVERY SIX HOURS AS NEEDED FOR MILD PAIN 100 tablet 3    albuterol (PROAIR HFA/PROVENTIL HFA/VENTOLIN HFA) 108 (90 Base) MCG/ACT inhaler Inhale 2 puffs into the lungs every 4 hours as needed for shortness of breath, wheezing or cough. 18 g 3    ammonium lactate (AMLACTIN) 12 % external cream APPLY TOPICALLY DAILY AS NEEDED FOR DRY SKIN (ON FEET) 140 g 5    benzoyl peroxide 5 % external liquid Use topically in showers as a body wash 226  g 5    blood glucose (NO BRAND SPECIFIED) lancets standard Use to test blood sugar 1 times daily or as directed. 100 each 11    calcium carbonate (TUMS) 500 MG chewable tablet Take 1 tablet (500 mg) by mouth 2 times daily. 180 tablet 3    Continuous Glucose  (FREESTYLE CLAUDIA 3 READER) CECILIO 1 each continuously. use to read blood sugar per 's instructions 1 each 0    Continuous Glucose  (FREESTYLE CLAUDIA 3 READER) CECILIO 1 each See Admin Instructions. use to read blood sugar per  instructions 1 each 0    Continuous Glucose Sensor (FREESTYLE CLAUDIA 3 PLUS SENSOR) MISC Change every 15 days. 6 each 1    diphenoxylate-atropine (LOMOTIL) 2.5-0.025 MG tablet TAKE 2 TABLETS BY MOUTH FOUR TIMES A DAY AS NEEDED FOR DIARRHEA 60 tablet 3    ELIQUIS ANTICOAGULANT 5 MG tablet TAKE 1 TABLET BY MOUTH TWICE A DAY 60 tablet 3    furosemide (LASIX) 20 MG tablet Take 1 tablet (20 mg) by mouth as needed (for edema). 90 tablet 3    insulin degludec (TRESIBA FLEXTOUCH) 100 UNIT/ML pen INJECT 14 UNITS SUBCUTANEOUSLY ONCE DAILY 15 mL 3    insulin pen needle (BD VIKTORIA U/F) 32G X 4 MM miscellaneous USE 5 PER  each 3    JARDIANCE 10 MG TABS tablet TAKE 1 TABLET BY MOUTH DAILY 30 tablet 11    ketoconazole (NIZORAL) 2 % external shampoo APPLY A THIN LAYER TOPICALLY TO SCALP IN SHOWER, LEAVE ON 5 MIN PRIOR TO RINSE (MAY ALSO USE AS BODY WASH) 120 mL 5    lisinopril (ZESTRIL) 10 MG tablet Take 1 tablet (10 mg) by mouth daily. 30 tablet 11    LOKELMA 10 g PACK packet DISSOLVE AND TAKE ONE PACKET BY MOUTH THREE TIMES A DAY FOR 2 DAYS, THEN DISSOLVE AND TAKE ONE PACKET BY MOUTH DAILY. 30 packet 10    loperamide (IMODIUM) 2 MG capsule Take 1 capsule (2 mg) by mouth 4 times daily as needed for diarrhea. 120 capsule 3    MAGNESIUM OXIDE 400 (240 Mg) MG tablet TAKE 1 TABLET BY MOUTH DAILY 30 tablet 5    metoprolol succinate ER (TOPROL XL) 25 MG 24 hr tablet TAKE 1 TABLET BY MOUTH DAILY 30 tablet 3    Multiple  Vitamin (DAILY-GLADIS MULTIVITAMIN) TABS TAKE 1 TABLET BY MOUTH ONCE DAILY 30 tablet 11    NIFEdipine ER OSMOTIC (PROCARDIA XL) 60 MG 24 hr tablet TAKE 1 TABLET BY MOUTH TWICE A DAY 60 tablet 3    omega 3 1000 MG CAPS Take 2,000 mg by mouth 2 times daily. 120 capsule 11    ondansetron (ZOFRAN ODT) 8 MG ODT tab Take 1 tablet (8 mg) by mouth every 8 hours as needed for nausea 15 tablet 3    pantoprazole (PROTONIX) 40 MG EC tablet TAKE 1 TABLET BY MOUTH ONCE DAILY BEFORE BREAKFAST 90 tablet 3    phosphorus tablet 250 mg 250 MG per tablet Take 1 tablet (250 mg) by mouth 4 times daily. 120 tablet 1    prednisoLONE acetate (PRED FORTE) 1 % ophthalmic suspension  (Patient not taking: No sig reported)      rosuvastatin (CRESTOR) 20 MG tablet Take 1 tablet (20 mg) by mouth daily. 30 tablet 11    SORAfenib (NEXAVAR) 200 MG tablet Take 1 tablet (200 mg) by mouth 2 times daily. On an empty stomach 1 hour before or 2 hours after a meal. 60 tablet 0    tamsulosin (FLOMAX) 0.4 MG capsule TAKE 1 CAPSULE BY MOUTH ONCE DAILY 30 capsule 11    triamcinolone (KENALOG) 0.1 % external cream Apply topically 2 times daily. 80 g 0    UltiCare Alcohol Swabs 70 % PADS 1 each by Other route 4 times daily 400 each 1    VITAMIN D3 50 MCG ( UT) tablet TAKE 1 TABLET BY MOUTH ONCE DAILY 30 tablet 8       Allergies  Allergies   Allergen Reactions    Codeine Other (See Comments)     Cannot take due to liver  Cannot tolerate oral narcotics    Seasonal Allergies      Sneezing, coughing, runny and itchy eyes       Family hx Social hx   Family History   Problem Relation Age of Onset    Skin Cancer Mother     Cancer Mother         mastectomy    Diabetes Mother          3/2016    Cerebrovascular Disease Mother         Passed away in Feb of this year, 80 years old.    Thyroid Disease Mother     Depression Mother     Breast Cancer Mother     Obesity Mother         280 pounds  from this and complications from D    Pancreatic Cancer Father 60     "Prostate Cancer Father         Currently in Remission    Macular Degeneration Father     Glaucoma Father     Skin Cancer Father     Coronary Artery Disease Father         Started in his 70's  at 88 in Octobe2023    Colon Cancer Father     Thyroid Disease Sister     Depression Sister     Asthma Sister     Sleep Apnea Brother     Colorectal Cancer Maternal Grandmother     Cancer Maternal Grandmother     Substance Abuse Maternal Grandmother         Alcohol    Prostate Cancer Maternal Grandfather     Substance Abuse Maternal Grandfather         Alcohol    Colorectal Cancer Paternal Grandmother     Asthma Sister         Had since birth    Liver Disease No family hx of     Melanoma No family hx of     Anesthesia Reaction No family hx of     Deep Vein Thrombosis (DVT) No family hx of       Social History     Tobacco Use    Smoking status: Former     Types: Dip, chew, snus or snuff     Passive exposure: Past    Smokeless tobacco: Former     Types: Chew     Quit date: 10/31/2017    Tobacco comments:     Quit [place] after Doctor told me in  that if I didn't I would   Vaping Use    Vaping status: Never Used   Substance Use Topics    Alcohol use: No     Comment: quit 1996    Drug use: No     - Lives alone.  .  - Alcohol: Quit in   - Tobacco: Former chewing tobacco use, quit      Review of systems  A 10-point review of systems was negative.    Examination  /74   Pulse 86   Temp 98.5  F (36.9  C) (Oral)   Ht 1.753 m (5' 9\")   Wt 76.2 kg (168 lb)   SpO2 94%   BMI 24.81 kg/m     Body mass index is 24.81 kg/m .    Gen-NAD  Eye-no conjunctival icterus  ENT-missing teeth on top and bottom; no dentures  CVS- RRR, no murmurs  RS- CTA bilaterally  Abd-soft, nontender, nondistended  Extr- 2+ radial pulses bilaterally  MS- hands without clubbing  Skin- no jaundice  Psych- normal mood    Laboratory  BMP  Recent Labs   Lab Test 25  0850 25  1354 24  1336 24  1007   NA " 136 137 140 141   POTASSIUM 5.4* 4.8 5.6* 4.9   CHLORIDE 101 101 105 106   DEBORAH 7.8* 7.5* 8.2* 7.6*   CO2 23 21* 21* 22   BUN 43.1* 63.0* 37.2* 31.0*   CR 1.98* 2.35* 1.68* 1.65*   * 116* 215* 142*     CBC  Recent Labs   Lab Test 02/18/25  1354 12/18/24  1336 12/13/24  1007 12/11/24  1300   WBC 6.1 6.4 5.8 6.7   RBC 3.10* 3.09* 3.43* 3.29*   HGB 9.4* 9.2* 10.2* 9.9*   HCT 30.3* 30.4* 33.3* 31.7*   MCV 98 98 97 96   MCH 30.3 29.8 29.7 30.1   MCHC 31.0* 30.3* 30.6* 31.2*   RDW 15.0 15.7* 15.9* 15.9*    151 184 169     Liver Enzymes   Recent Labs   Lab Test 02/18/25  1354   PROTTOTAL 7.2   ALBUMIN 4.1   BILITOTAL 0.3   ALKPHOS 83   AST 29   ALT 28      INR   INR   Date Value Ref Range Status   08/11/2024 1.11 0.85 - 1.15 Final   01/24/2020 1.09 0.86 - 1.14 Final         Radiology  CT Abdomen with and without contrast 12/4/2024  1. Postsurgical changes of liver transplant without evidence of  recurrent hepatocellular carcinoma.  2. Stable scattered peritoneal nodules.  3. Stable hypodense lesion within the pancreatic head, possibly  representing a sidebranch IPMN.  4. Stable 4 mm pulmonary nodule.    Endoscopy  Colonoscopy 2019  - No endoscopic source for diarrhea, thus random                        biopsies were taken.                        - The entire examined colon is normal. Biopsied to                        evaluate for MMF.                        - The examined portion of the ileum was normal, except                        for mild patchy erythema.

## 2025-03-06 DIAGNOSIS — E83.39 HYPOPHOSPHATEMIA: ICD-10-CM

## 2025-03-06 NOTE — TELEPHONE ENCOUNTER
Phosphorus tablet 250mg   Last prescribing provider: Alisha Breen    Last clinic visit date: 12/11/24    Recommendations for requested medication (if none, N/A): NA    Any other pertinent information (if none, N/A): fax request    Refilled: Y/N, if NO, why?

## 2025-03-10 ENCOUNTER — OFFICE VISIT (OUTPATIENT)
Dept: ORTHOPEDICS | Facility: CLINIC | Age: 60
End: 2025-03-10
Payer: MEDICARE

## 2025-03-10 DIAGNOSIS — N18.31 TYPE 2 DIABETES MELLITUS WITH STAGE 3A CHRONIC KIDNEY DISEASE, WITH LONG-TERM CURRENT USE OF INSULIN (H): ICD-10-CM

## 2025-03-10 DIAGNOSIS — L60.2 ONYCHAUXIS: ICD-10-CM

## 2025-03-10 DIAGNOSIS — M21.969 TYPE 2 DIABETES MELLITUS WITH DIABETIC FOOT DEFORMITY (H): ICD-10-CM

## 2025-03-10 DIAGNOSIS — E11.22 TYPE 2 DIABETES MELLITUS WITH STAGE 3A CHRONIC KIDNEY DISEASE, WITH LONG-TERM CURRENT USE OF INSULIN (H): ICD-10-CM

## 2025-03-10 DIAGNOSIS — L84 TYPE 2 DIABETES MELLITUS WITH PRESSURE CALLUS (H): ICD-10-CM

## 2025-03-10 DIAGNOSIS — Z79.4 TYPE 2 DIABETES MELLITUS WITH STAGE 3A CHRONIC KIDNEY DISEASE, WITH LONG-TERM CURRENT USE OF INSULIN (H): ICD-10-CM

## 2025-03-10 DIAGNOSIS — E11.51 DIABETES MELLITUS WITH PERIPHERAL VASCULAR DISEASE (H): ICD-10-CM

## 2025-03-10 DIAGNOSIS — E11.49 TYPE II OR UNSPECIFIED TYPE DIABETES MELLITUS WITH NEUROLOGICAL MANIFESTATIONS, NOT STATED AS UNCONTROLLED(250.60) (H): Primary | ICD-10-CM

## 2025-03-10 DIAGNOSIS — E11.628 TYPE 2 DIABETES MELLITUS WITH PRESSURE CALLUS (H): ICD-10-CM

## 2025-03-10 DIAGNOSIS — E11.69 TYPE 2 DIABETES MELLITUS WITH DIABETIC FOOT DEFORMITY (H): ICD-10-CM

## 2025-03-10 PROCEDURE — 11056 PARNG/CUTG B9 HYPRKR LES 2-4: CPT | Performed by: PODIATRIST

## 2025-03-10 PROCEDURE — 99207 PR DROP WITH A PROCEDURE: CPT | Performed by: PODIATRIST

## 2025-03-10 PROCEDURE — 11719 TRIM NAIL(S) ANY NUMBER: CPT | Performed by: PODIATRIST

## 2025-03-10 NOTE — PROGRESS NOTES
Past Medical History:   Diagnosis Date    Anemia 2013    Arthritis     BPH (benign prostatic hyperplasia)     CAD (coronary artery disease) 04/01/2019    Cholelithiasis     Conductive hearing loss 08/16/2017    Depressive disorder 1986    Suffer effects throughout life    Gastroesophageal reflux disease 12/01/2014    HCC (hepatocellular carcinoma) (H) 01/22/2019    History of blood transfusion 2019    Had blood transfussion until Dec 2022    History of diabetic retinopathy 07/2018    HTN (hypertension) 11/20/2019    Hyperlipidemia     Liver cirrhosis secondary to ESTRADA (H)     Liver transplanted (H) 11/11/2019    Portal vein thrombosis 04/11/2019    SCC (squamous cell carcinoma) 02/15/2023    02/15/23:  Left temporal scalp    Type II diabetes mellitus (H)      Patient Active Problem List   Diagnosis    Bipolar affective disorder in remission    Esophageal varices determined by endoscopy (H)    Erectile dysfunction due to diseases classified elsewhere    HCC (hepatocellular carcinoma) (H)    Equivocal stress echocardiogram    Type 2 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, unspecified whether long term insulin use (H)    Status post coronary angiogram    CAD (coronary artery disease)    Liver transplant recipient (H)    Status post liver transplantation (H)    Immunosuppressed status    Benign essential hypertension    Anemia of chronic renal failure, stage 3a (H)    History of coronary artery disease    Dyslipidemia    Excessive sweating    Stage 3b chronic kidney disease (H)    Anemia of chronic renal failure, stage 3b (H)    NSTEMI (non-ST elevated myocardial infarction) (H)    Chest pain, unspecified type    Hypertensive chronic kidney disease with stage 5 chronic kidney disease or end stage renal disease (H)    Vitreous hemorrhage of left eye (H)    Proliferative diabetic retinopathy of both eyes associated with type 2 diabetes mellitus, unspecified  proliferative retinopathy type (H)    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)    Liver replaced by transplant (H)    Hyperkalemia    Acute renal failure, unspecified acute renal failure type    ARIELA (obstructive sleep apnea)    History of nonmelanoma skin cancer    Neoplasm of unspecified behavior of bone, soft tissue, and skin    SCC (squamous cell carcinoma)    AK (actinic keratosis)     Past Surgical History:   Procedure Laterality Date    ABDOMEN SURGERY  11/11/2019    Had Liver Transplant    BIOPSY  12/2022    Had biopsy done will have additional procedure 2/15/2023    BYPASS GRAFT ARTERY CORONARY N/A 07/14/2021    Procedure: median sternotomy, on cardiopulmonary bypass, CORONARY ARTERY BYPASS GRAFT (CABG) x2 with left greater saphenous vein endoscopic harvest and left internal mammery artery harvest;  Surgeon: Tom Zapata MD;  Location: [facility] OR    CARDIAC SURGERY  7/2021    Heart Graph. and bypass surgery    CHOLECYSTECTOMY  11/11/2022    Removed with Liver Transplant    COLONOSCOPY      2015    COLONOSCOPY N/A 12/06/2019    Procedure: COLONOSCOPY, WITH POLYPECTOMY AND BIOPSY;  Surgeon: Adam Morton MD;  Location:  GI    CV CENTRAL VENOUS CATHETER PLACEMENT N/A 07/12/2021    Procedure: Central Venous Catheter Placement;  Surgeon: Fermin Polanco MD;  Location: [facility] CARDIAC CATH LAB    CV CORONARY ANGIOGRAM N/A 07/12/2021    Procedure: Coronary Angiogram;  Surgeon: Fermin Polanco MD;  Location: [facility] CARDIAC CATH LAB    CV HEART CATHETERIZATION WITH POSSIBLE INTERVENTION N/A 02/26/2019    Procedure: CORS;  Surgeon: Jagdish Hoyt MD;  Location: [facility] CARDIAC CATH LAB    CV INTRA AORTIC BALLOON N/A 07/12/2021    Procedure: Intra Aortic Balloon Pump Insertion;  Surgeon: Fermin Polanco MD;  Location: [facility] CARDIAC CATH LAB    ESOPHAGOSCOPY, GASTROSCOPY, DUODENOSCOPY (EGD), COMBINED N/A 11/17/2016    Procedure: COMBINED  ESOPHAGOSCOPY, GASTROSCOPY, DUODENOSCOPY (EGD);  Surgeon: Santi Rosas MD;  Location: [facility] GI    ESOPHAGOSCOPY, GASTROSCOPY, DUODENOSCOPY (EGD), COMBINED N/A 11/17/2017    Procedure: COMBINED ESOPHAGOSCOPY, GASTROSCOPY, DUODENOSCOPY (EGD);  EGD;  Surgeon: Santi Rosas MD;  Location: [facility] GI    ESOPHAGOSCOPY, GASTROSCOPY, DUODENOSCOPY (EGD), COMBINED N/A 12/28/2018    Procedure: EGD;  Surgeon: Santi Rosas MD;  [facility]    ESOPHAGOSCOPY, GASTROSCOPY, DUODENOSCOPY (EGD), COMBINED N/A 12/06/2019    Procedure: ESOPHAGOGASTRODUODENOSCOPY, WITH BIOPSY;  Surgeon: Adam Morton MD;  Location: [facility] GI    ESOPHAGOSCOPY, GASTROSCOPY, DUODENOSCOPY (EGD), COMBINED N/A 02/13/2020    Procedure: ESOPHAGOGASTRODUODENOSCOPY (EGD);  Surgeon: Santi Rosas MD;  Location:  GI    EXCISE MASS ABDOMEN N/A 07/13/2023    Procedure: Laparoscopic lysis of adhesions, laparoscopic resection of abdominal mass;  Surgeon: Ruiz Chu MD;  Location:  OR    HEAD & NECK SURGERY      12/2017 at [facility].     IMPLANT GOLD WEIGHT EYELID Right 11/16/2017    Procedure: IMPLANT WEIGHT EYELID;  Right Upper Eyelid Weight, right tarsal strip lower eyelid;  Surgeon: Milana Malave MD;  [facility]    IR CHEMO EMBOLIZATION  01/22/2019    KNEE SURGERY Left     ORTHOPEDIC SURGERY      PAROTIDECTOMY, RADICAL NECK DISSECTION Right 11/02/2017    Procedure: PAROTIDECTOMY, RADICAL NECK DISSECTION;  Right Superfacial Parotidectomy , Facial nerve repair. with [facility] facial nerve monitor.;  Surgeon: Asiya Morgan MD;  Location: [facility] OR    PHACOEMULSIFICATION CLEAR CORNEA WITH STANDARD INTRAOCULAR LENS IMPLANT Right 01/24/2023    Procedure: PHACOEMULSIFICATION, COMPLEX CATARACT, WITH INTRAOCULAR LENS IMPLANT WITH TRYPAN RIGHT EYE;  Surgeon: Enriqueta Martin MD;  Location: [facility] OR    PHACOEMULSIFICATION WITH STANDARD INTRAOCULAR LENS IMPLANT Left 01/03/2023    Procedure: PHACOEMULSIFICATION, COMPLEX  CATARACT, WITH STANDARD INTRAOCULAR LENS IMPLANT INSERTION LEFT EYE;  Surgeon: Enriqueta Martin MD;  Location: [facility] OR    PICC INSERTION Left 2017    4fr SL BioFlo PICC, 44cm in the L basilic vein w/ tip in the low SVC    RETURN LIVER TRANSPLANT N/A 2019    Procedure: Exploratory laparotomy, hematoma evacuation, abdominal washout;  Surgeon: Александр Ramos MD;  Location: [facility] OR    TRANSPLANT LIVER RECIPIENT  DONOR N/A 2019    Procedure: TRANSPLANT, LIVER, RECIPIENT,  DONOR;  Surgeon: Александр Ramos MD;  Location: [facility] OR    VASCULAR SURGERY       Social History     Socioeconomic History    Marital status:      Spouse name: Not on file    Number of children: Not on file    Years of education: Not on file    Highest education level: Bachelor's degree (e.g., BA, AB, BS)   Occupational History    Not on file   Tobacco Use    Smoking status: Former     Types: Dip, chew, snus or snuff     Passive exposure: Past    Smokeless tobacco: Former     Types: Chew     Quit date: 10/31/2017    Tobacco comments:     Quit Cold [place] after Doctor told me in 2017 that if I didn't I would   Vaping Use    Vaping status: Never Used   Substance and Sexual Activity    Alcohol use: No     Comment: quit 1996    Drug use: No    Sexual activity: Not Currently     Partners: Female     Birth control/protection: Condom   Other Topics Concern    Parent/sibling w/ CABG, MI or angioplasty before 65F 55M? No   Social History Narrative    Prior , [place]     Social Drivers of Health     Financial Resource Strain: Low Risk  (2024)    Financial Resource Strain     Within the past 12 months, have you or your family members you live with been unable to get utilities (heat, electricity) when it was really needed?: No   Food Insecurity: Low Risk  (2024)    Food Insecurity     Within the past 12 months, did you worry that your food would run out before you got money to buy more?:  No     Within the past 12 months, did the food you bought just not last and you didn t have money to get more?: No   Transportation Needs: Low Risk  (2024)    Transportation Needs     Within the past 12 months, has lack of transportation kept you from medical appointments, getting your medicines, non-medical meetings or appointments, work, or from getting things that you need?: No   Physical Activity: Insufficiently Active (10/13/2020)    Exercise Vital Sign     Days of Exercise per Week: 1 day     Minutes of Exercise per Session: 60 min   Stress: Stress Concern Present (10/13/2020)    [employer] [place] [employer] - Occupational Stress Questionnaire     Feeling of Stress : To some extent   Social Connections: Socially Isolated (10/13/2020)    Social Connection and Isolation Panel [NHANES]     Frequency of Communication with Friends and Family: Once a week     Frequency of Social Gatherings with Friends and Family: Once a week     Attends Restorationism Services: Never     Active Member of Clubs or Organizations: No     Attends Club or Organization Meetings: Never     Marital Status:    Interpersonal Safety: Low Risk  (2024)    Interpersonal Safety     Do you feel physically and emotionally safe where you currently live?: Yes     Within the past 12 months, have you been hit, slapped, kicked or otherwise physically hurt by someone?: No     Within the past 12 months, have you been humiliated or emotionally abused in other ways by your partner or ex-partner?: No   Housing Stability: Low Risk  (2024)    Housing Stability     Do you have housing? : Yes     Are you worried about losing your housing?: No     Family History   Problem Relation Age of Onset    Skin Cancer Mother     Cancer Mother         mastectomy    Diabetes Mother          3/2016    Cerebrovascular Disease Mother         Passed away in Feb of this year, 80 years old.    Thyroid Disease Mother     Depression Mother      "Breast Cancer Mother     Obesity Mother         280 pounds  from this and complications from D    Pancreatic Cancer Father 60    Prostate Cancer Father         Currently in Remission    Macular Degeneration Father     Glaucoma Father     Skin Cancer Father     Coronary Artery Disease Father         Started in his 70's  at 88 in 2023    Colon Cancer Father     Thyroid Disease Sister     Depression Sister     Asthma Sister     Sleep Apnea Brother     Colorectal Cancer Maternal Grandmother     Cancer Maternal Grandmother     Substance Abuse Maternal Grandmother         Alcohol    Prostate Cancer Maternal Grandfather     Substance Abuse Maternal Grandfather         Alcohol    Colorectal Cancer Paternal Grandmother     Asthma Sister         Had since birth    Liver Disease No family hx of     Melanoma No family hx of     Anesthesia Reaction No family hx of     Deep Vein Thrombosis (DVT) No family hx of            Lab Results   Component Value Date    A1C 5.6 2024    A1C 5.6 2024    A1C 5.6 2024    A1C 5.7 2023    A1C 6.3 2023    A1C 6.9 2022    A1C 6.0 2020    A1C 6.3 2020    A1C 6.6 2018    A1C 6.5 2017    A1C 7.8 10/25/2016     Lab Results   Component Value Date    WBC 6.1 2025    WBC 4.4 2021     Lab Results   Component Value Date    RBC 3.10 2025    RBC 2.35 2021     Lab Results   Component Value Date    HGB 9.4 2025    HGB 7.3 2021     Lab Results   Component Value Date    HCT 30.3 2025    HCT 22.6 2021     No components found for: \"MCT\"  Lab Results   Component Value Date    MCV 98 2025    MCV 96 2021     Lab Results   Component Value Date    MCH 30.3 2025    MCH 31.1 2021     Lab Results   Component Value Date    MCHC 31.0 2025    MCHC 32.3 2021     Lab Results   Component Value Date    RDW 15.0 2025    RDW 15.1 2021     Lab Results   Component " Value Date     02/18/2025     06/28/2021     Last Comprehensive Metabolic Panel:  Lab Results   Component Value Date     02/21/2025    POTASSIUM 5.4 (H) 02/21/2025    CHLORIDE 101 02/21/2025    CO2 23 02/21/2025    ANIONGAP 12 02/21/2025     (H) 02/21/2025    BUN 43.1 (H) 02/21/2025    CR 1.98 (H) 02/21/2025    GFRESTIMATED 38 (L) 02/21/2025    DEBORAH 7.8 (L) 02/21/2025         Subjective findings- 60-year-old returns clinic for Diabetes with peripheral Neuropathy and foot deformity and callus.  Relates to wearing his diabetic shoes and insoles.  Relates he is doing about 9-10,000 steps a day.  Relates he is working out 2-3 times a week as well with weights.  Relates to no injuries, no ulcers, no sores since we seen him last.  Relates the calluses buildup and hurt at times.  Relates he does get some pain in his feet.  Relates he needs his toenails cut.  Relates he has numbness tingling and neuropathy in his feet.     Objective findings- DP and PT are 2 out of 4 bilaterally.  Has decreased hair growth bilaterally.  Has dorsally contracted digits 2 through 5 bilaterally.  Has functional hallux limitus bilaterally.  Has nucleated hyperkeratotic tissue buildup plantar first and fifth MPJ bilaterally, plantar medial hallux bilaterally, plantar fourth MPJ right, plantar 2 through 4 MPJs left with mild pain on palpation.  No tendon voids bilaterally.  There is no erythema, no edema, no drainage, no odor, no calor bilaterally.     Assessment and plan- Onychauxis with Onychocryptosis bilaterally.  Diabetes with peripheral Neuropathy.  Hammertoes and functional Hallux Limitus bilaterally.  Tylomas bilaterally causing pain.  Diagnosis and treatment options discussed with the patient.  All the toenails were reduced bilaterally upon consent.  All the Tylomas bilaterally were sharp debrided with a 10 blade upon consent, 5+ lesions were debrided.  Continue the Diabetic shoes.  Previous notes reviewed.   Return to clinic and see me in 2 to 3 months.                                                                 Moderate level of medical decision making.

## 2025-03-10 NOTE — LETTER
3/10/2025      Frandy Workman  [address] [facility] [address]      Dear Colleague,    Thank you for referring your patient, Frandy Workman, to the [facility] [place]. Please see a copy of my visit note below.    Past Medical History:   Diagnosis Date     Anemia 2013     Arthritis      BPH (benign prostatic hyperplasia)      CAD (coronary artery disease) 04/01/2019     Cholelithiasis      Conductive hearing loss 08/16/2017     Depressive disorder 1986    Suffer effects throughout life     Gastroesophageal reflux disease 12/01/2014     HCC (hepatocellular carcinoma) (H) 01/22/2019     History of blood transfusion 2019    Had blood transfussion until Dec 2022     History of diabetic retinopathy 07/2018     HTN (hypertension) 11/20/2019     Hyperlipidemia      Liver cirrhosis secondary to ESTRADA (H)      Liver transplanted (H) 11/11/2019     Portal vein thrombosis 04/11/2019     SCC (squamous cell carcinoma) 02/15/2023    02/15/23:  Left temporal scalp     Type II diabetes mellitus (H)      Patient Active Problem List   Diagnosis     Bipolar affective disorder in remission     Esophageal varices determined by endoscopy (H)     Erectile dysfunction due to diseases classified elsewhere     HCC (hepatocellular carcinoma) (H)     Equivocal stress echocardiogram     Type 2 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, unspecified whether long term insulin use (H)     Status post coronary angiogram     CAD (coronary artery disease)     Liver transplant recipient (H)     Status post liver transplantation (H)     Immunosuppressed status     Benign essential hypertension     Anemia of chronic renal failure, stage 3a (H)     History of coronary artery disease     Dyslipidemia     Excessive sweating     Stage 3b chronic kidney disease (H)     Anemia of chronic renal failure, stage 3b (H)     NSTEMI (non-ST elevated myocardial infarction) (H)      Chest pain, unspecified type     Hypertensive chronic kidney disease with stage 5 chronic kidney disease or end stage renal disease (H)     Vitreous hemorrhage of left eye (H)     Proliferative diabetic retinopathy of both eyes associated with type 2 diabetes mellitus, unspecified proliferative retinopathy type (H)     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)     Liver replaced by transplant (H)     Hyperkalemia     Acute renal failure, unspecified acute renal failure type     ARIELA (obstructive sleep apnea)     History of nonmelanoma skin cancer     Neoplasm of unspecified behavior of bone, soft tissue, and skin     SCC (squamous cell carcinoma)     AK (actinic keratosis)     Past Surgical History:   Procedure Laterality Date     ABDOMEN SURGERY  11/11/2019    Had Liver Transplant     BIOPSY  12/2022    Had biopsy done will have additional procedure 2/15/2023     BYPASS GRAFT ARTERY CORONARY N/A 07/14/2021    Procedure: median sternotomy, on cardiopulmonary bypass, CORONARY ARTERY BYPASS GRAFT (CABG) x2 with left greater saphenous vein endoscopic harvest and left internal mammery artery harvest;  Surgeon: Tom Zapata MD;  Location:  OR     CARDIAC SURGERY  7/2021    Heart Graph. and bypass surgery     CHOLECYSTECTOMY  11/11/2022    Removed with Liver Transplant     COLONOSCOPY      2015     COLONOSCOPY N/A 12/06/2019    Procedure: COLONOSCOPY, WITH POLYPECTOMY AND BIOPSY;  Surgeon: Adam Morton MD;  Location:  GI     CV CENTRAL VENOUS CATHETER PLACEMENT N/A 07/12/2021    Procedure: Central Venous Catheter Placement;  Surgeon: Fermin Polanco MD;  Location: [facility] CARDIAC CATH LAB     CV CORONARY ANGIOGRAM N/A 07/12/2021    Procedure: Coronary Angiogram;  Surgeon: Fermin Polanco MD;  Location: [facility] CARDIAC CATH LAB     CV HEART CATHETERIZATION WITH POSSIBLE INTERVENTION N/A 02/26/2019    Procedure: CORS;  Surgeon: Jagdish Hoyt MD;  Location:   HEART CARDIAC CATH LAB     CV INTRA AORTIC BALLOON N/A 07/12/2021    Procedure: Intra Aortic Balloon Pump Insertion;  Surgeon: Fermin Polanco MD;  Location:  HEART CARDIAC CATH LAB     ESOPHAGOSCOPY, GASTROSCOPY, DUODENOSCOPY (EGD), COMBINED N/A 11/17/2016    Procedure: COMBINED ESOPHAGOSCOPY, GASTROSCOPY, DUODENOSCOPY (EGD);  Surgeon: Santi Rosas MD;  Location:  GI     ESOPHAGOSCOPY, GASTROSCOPY, DUODENOSCOPY (EGD), COMBINED N/A 11/17/2017    Procedure: COMBINED ESOPHAGOSCOPY, GASTROSCOPY, DUODENOSCOPY (EGD);  EGD;  Surgeon: Santi Rosas MD;  Location:  GI     ESOPHAGOSCOPY, GASTROSCOPY, DUODENOSCOPY (EGD), COMBINED N/A 12/28/2018    Procedure: EGD;  Surgeon: Santi Rosas MD;  Location:  OR     ESOPHAGOSCOPY, GASTROSCOPY, DUODENOSCOPY (EGD), COMBINED N/A 12/06/2019    Procedure: ESOPHAGOGASTRODUODENOSCOPY, WITH BIOPSY;  Surgeon: Adam Morton MD;  Location:  GI     ESOPHAGOSCOPY, GASTROSCOPY, DUODENOSCOPY (EGD), COMBINED N/A 02/13/2020    Procedure: ESOPHAGOGASTRODUODENOSCOPY (EGD);  Surgeon: Santi Rosas MD;  Location:  GI     EXCISE MASS ABDOMEN N/A 07/13/2023    Procedure: Laparoscopic lysis of adhesions, laparoscopic resection of abdominal mass;  Surgeon: Ruiz Chu MD;  Location:  OR     HEAD & NECK SURGERY      12/2017 at [facility].      IMPLANT GOLD WEIGHT EYELID Right 11/16/2017    Procedure: IMPLANT WEIGHT EYELID;  Right Upper Eyelid Weight, right tarsal strip lower eyelid;  Surgeon: Milana Malave MD;  Location:  OR     IR CHEMO EMBOLIZATION  01/22/2019     KNEE SURGERY Left      ORTHOPEDIC SURGERY       PAROTIDECTOMY, RADICAL NECK DISSECTION Right 11/02/2017    Procedure: PAROTIDECTOMY, RADICAL NECK DISSECTION;  Right Superfacial Parotidectomy , Facial nerve repair. with [facility] facial nerve monitor.;  Surgeon: Asiya Morgan MD;  Location:  OR     PHACOEMULSIFICATION CLEAR CORNEA WITH STANDARD INTRAOCULAR LENS  IMPLANT Right 2023    Procedure: PHACOEMULSIFICATION, COMPLEX CATARACT, WITH INTRAOCULAR LENS IMPLANT WITH TRYPAN RIGHT EYE;  Surgeon: Enriqueta Martin MD;  Location: [facility] OR     PHACOEMULSIFICATION WITH STANDARD INTRAOCULAR LENS IMPLANT Left 2023    Procedure: PHACOEMULSIFICATION, COMPLEX CATARACT, WITH STANDARD INTRAOCULAR LENS IMPLANT INSERTION LEFT EYE;  Surgeon: Enriqueta Martin MD;  Location: [facility] OR     PICC INSERTION Left 2017    4fr SL BioFlo PICC, 44cm in the L basilic vein w/ tip in the low SVC     RETURN LIVER TRANSPLANT N/A 2019    Procedure: Exploratory laparotomy, hematoma evacuation, abdominal washout;  Surgeon: Александр Ramos MD;  Location: [facility] OR     TRANSPLANT LIVER RECIPIENT  DONOR N/A 2019    Procedure: TRANSPLANT, LIVER, RECIPIENT,  DONOR;  Surgeon: Александр Ramos MD;  Location: [facility] OR     VASCULAR SURGERY       Social History     Socioeconomic History     Marital status:      Spouse name: Not on file     Number of children: Not on file     Years of education: Not on file     Highest education level: Bachelor's degree (e.g., BA, AB, BS)   Occupational History     Not on file   Tobacco Use     Smoking status: Former     Types: Dip, chew, snus or snuff     Passive exposure: Past     Smokeless tobacco: Former     Types: Chew     Quit date: 10/31/2017     Tobacco comments:     Quit [place] after Doctor told me in 2017 that if I didn't I would   Vaping Use     Vaping status: Never Used   Substance and Sexual Activity     Alcohol use: No     Comment: quit 1996     Drug use: No     Sexual activity: Not Currently     Partners: Female     Birth control/protection: Condom   Other Topics Concern     Parent/sibling w/ CABG, MI or angioplasty before 65F 55M? No   Social History Narrative    Prior , [place]     Social Drivers of Health     Financial Resource Strain: Low Risk  (2024)    Financial Resource Strain       Within the past 12 months, have you or your family members you live with been unable to get utilities (heat, electricity) when it was really needed?: No   Food Insecurity: Low Risk  (1/23/2024)    Food Insecurity      Within the past 12 months, did you worry that your food would run out before you got money to buy more?: No      Within the past 12 months, did the food you bought just not last and you didn t have money to get more?: No   Transportation Needs: Low Risk  (1/23/2024)    Transportation Needs      Within the past 12 months, has lack of transportation kept you from medical appointments, getting your medicines, non-medical meetings or appointments, work, or from getting things that you need?: No   Physical Activity: Insufficiently Active (10/13/2020)    Exercise Vital Sign      Days of Exercise per Week: 1 day      Minutes of Exercise per Session: 60 min   Stress: Stress Concern Present (10/13/2020)    [employer] - Occupational Stress Questionnaire      Feeling of Stress : To some extent   Social Connections: Socially Isolated (10/13/2020)    Social Connection and Isolation Panel [NHANES]      Frequency of Communication with Friends and Family: Once a week      Frequency of Social Gatherings with Friends and Family: Once a week      Attends [employer] Services: Never      Active Member of Clubs or Organizations: No      Attends Club or Organization Meetings: Never      Marital Status:    Interpersonal Safety: Low Risk  (12/5/2024)    Interpersonal Safety      Do you feel physically and emotionally safe where you currently live?: Yes      Within the past 12 months, have you been hit, slapped, kicked or otherwise physically hurt by someone?: No      Within the past 12 months, have you been humiliated or emotionally abused in other ways by your partner or ex-partner?: No   Housing Stability: Low Risk  (1/23/2024)    Housing Stability      Do you have housing? : Yes       Are you worried about losing your housing?: No     Family History   Problem Relation Age of Onset     Skin Cancer Mother      Cancer Mother         mastectomy     Diabetes Mother          3/2016     Cerebrovascular Disease Mother         Passed away in Feb of this year, 80 years old.     Thyroid Disease Mother      Depression Mother      Breast Cancer Mother      Obesity Mother         280 pounds  from this and complications from D     Pancreatic Cancer Father 60     Prostate Cancer Father         Currently in Remission     Macular Degeneration Father      Glaucoma Father      Skin Cancer Father      Coronary Artery Disease Father         Started in his 70's  at 88 in Octobe2023     Colon Cancer Father      Thyroid Disease Sister      Depression Sister      Asthma Sister      Sleep Apnea Brother      Colorectal Cancer Maternal Grandmother      Cancer Maternal Grandmother      Substance Abuse Maternal Grandmother         Alcohol     Prostate Cancer Maternal Grandfather      Substance Abuse Maternal Grandfather         Alcohol     Colorectal Cancer Paternal Grandmother      Asthma Sister         Had since birth     Liver Disease No family hx of      Melanoma No family hx of      Anesthesia Reaction No family hx of      Deep Vein Thrombosis (DVT) No family hx of            Lab Results   Component Value Date    A1C 5.6 2024    A1C 5.6 2024    A1C 5.6 2024    A1C 5.7 2023    A1C 6.3 2023    A1C 6.9 2022    A1C 6.0 2020    A1C 6.3 2020    A1C 6.6 2018    A1C 6.5 2017    A1C 7.8 10/25/2016     Lab Results   Component Value Date    WBC 6.1 2025    WBC 4.4 2021     Lab Results   Component Value Date    RBC 3.10 2025    RBC 2.35 2021     Lab Results   Component Value Date    HGB 9.4 2025    HGB 7.3 2021     Lab Results   Component Value Date    HCT 30.3 2025    HCT 22.6 2021     No components found  "for: \"MCT\"  Lab Results   Component Value Date    MCV 98 02/18/2025    MCV 96 06/28/2021     Lab Results   Component Value Date    MCH 30.3 02/18/2025    MCH 31.1 06/28/2021     Lab Results   Component Value Date    MCHC 31.0 02/18/2025    MCHC 32.3 06/28/2021     Lab Results   Component Value Date    RDW 15.0 02/18/2025    RDW 15.1 06/28/2021     Lab Results   Component Value Date     02/18/2025     06/28/2021     Last Comprehensive Metabolic Panel:  Lab Results   Component Value Date     02/21/2025    POTASSIUM 5.4 (H) 02/21/2025    CHLORIDE 101 02/21/2025    CO2 23 02/21/2025    ANIONGAP 12 02/21/2025     (H) 02/21/2025    BUN 43.1 (H) 02/21/2025    CR 1.98 (H) 02/21/2025    GFRESTIMATED 38 (L) 02/21/2025    DEBORAH 7.8 (L) 02/21/2025         Subjective findings- 60-year-old returns clinic for Diabetes with peripheral Neuropathy and foot deformity and callus.  Relates to wearing his diabetic shoes and insoles.  Relates he is doing about 9-10,000 steps a day.  Relates he is working out 2-3 times a week as well with weights.  Relates to no injuries, no ulcers, no sores since we seen him last.  Relates the calluses buildup and hurt at times.  Relates he does get some pain in his feet.  Relates he needs his toenails cut.  Relates he has numbness tingling and neuropathy in his feet.     Objective findings- DP and PT are 2 out of 4 bilaterally.  Has decreased hair growth bilaterally.  Has dorsally contracted digits 2 through 5 bilaterally.  Has functional hallux limitus bilaterally.  Has nucleated hyperkeratotic tissue buildup plantar first and fifth MPJ bilaterally, plantar medial hallux bilaterally, plantar fourth MPJ right, plantar 2 through 4 MPJs left with mild pain on palpation.  No tendon voids bilaterally.  There is no erythema, no edema, no drainage, no odor, no calor bilaterally.     Assessment and plan- Onychauxis with Onychocryptosis bilaterally.  Diabetes with peripheral Neuropathy.  " Hammertoes and functional Hallux Limitus bilaterally.  Tylomas bilaterally causing pain.  Diagnosis and treatment options discussed with the patient.  All the toenails were reduced bilaterally upon consent.  All the Tylomas bilaterally were sharp debrided with a 10 blade upon consent, 5+ lesions were debrided.  Continue the Diabetic shoes.  Previous notes reviewed.  Return to clinic and see me in 2 to 3 months.                                                                 Moderate level of medical decision making.       Again, thank you for allowing me to participate in the care of your patient.        Sincerely,        Lamin Gonzalez DPM    Electronically signed

## 2025-03-11 ENCOUNTER — OFFICE VISIT (OUTPATIENT)
Dept: GASTROENTEROLOGY | Facility: CLINIC | Age: 60
End: 2025-03-11
Attending: INTERNAL MEDICINE
Payer: MEDICARE

## 2025-03-11 VITALS
SYSTOLIC BLOOD PRESSURE: 115 MMHG | DIASTOLIC BLOOD PRESSURE: 74 MMHG | TEMPERATURE: 98.5 F | HEIGHT: 69 IN | OXYGEN SATURATION: 94 % | HEART RATE: 86 BPM | WEIGHT: 168 LBS | BODY MASS INDEX: 24.88 KG/M2

## 2025-03-11 DIAGNOSIS — Z94.4 STATUS POST LIVER TRANSPLANTATION (H): ICD-10-CM

## 2025-03-11 DIAGNOSIS — Z94.4 LIVER REPLACED BY TRANSPLANT (H): ICD-10-CM

## 2025-03-11 PROCEDURE — 99215 OFFICE O/P EST HI 40 MIN: CPT | Performed by: INTERNAL MEDICINE

## 2025-03-11 PROCEDURE — G0463 HOSPITAL OUTPT CLINIC VISIT: HCPCS | Performed by: INTERNAL MEDICINE

## 2025-03-11 PROCEDURE — 3074F SYST BP LT 130 MM HG: CPT | Performed by: INTERNAL MEDICINE

## 2025-03-11 PROCEDURE — G2211 COMPLEX E/M VISIT ADD ON: HCPCS | Performed by: INTERNAL MEDICINE

## 2025-03-11 PROCEDURE — 1125F AMNT PAIN NOTED PAIN PRSNT: CPT | Performed by: INTERNAL MEDICINE

## 2025-03-11 PROCEDURE — 99417 PROLNG OP E/M EACH 15 MIN: CPT | Performed by: INTERNAL MEDICINE

## 2025-03-11 PROCEDURE — 3078F DIAST BP <80 MM HG: CPT | Performed by: INTERNAL MEDICINE

## 2025-03-11 RX ORDER — LAMIVUDINE 100 MG/1
100 TABLET, FILM COATED ORAL DAILY
Qty: 90 TABLET | Refills: 2 | Status: SHIPPED | OUTPATIENT
Start: 2025-03-11

## 2025-03-11 RX ORDER — PREDNISONE 5 MG/1
5 TABLET ORAL DAILY
Qty: 30 TABLET | Refills: 11 | Status: SHIPPED | OUTPATIENT
Start: 2025-03-11

## 2025-03-11 RX ORDER — MYCOPHENOLATE MOFETIL 250 MG/1
500 CAPSULE ORAL 2 TIMES DAILY
Qty: 120 CAPSULE | Refills: 11 | Status: SHIPPED | OUTPATIENT
Start: 2025-03-11

## 2025-03-11 ASSESSMENT — PAIN SCALES - GENERAL: PAINLEVEL_OUTOF10: MODERATE PAIN (5)

## 2025-03-11 NOTE — NURSING NOTE
"Chief Complaint   Patient presents with    RECHECK     Follow Up     /74   Pulse 86   Temp 98.5  F (36.9  C) (Oral)   Ht 1.753 m (5' 9\")   Wt 76.2 kg (168 lb)   SpO2 94%   BMI 24.81 kg/m    Marisela Steward on 3/11/2025 at 11:08 AM    "

## 2025-03-11 NOTE — LETTER
3/11/2025      Frandy Workman  [address] [facility] [address]      Dear Colleague,    Thank you for referring your patient, Frandy Workman, to the [facility] HEPATOLOGY [facility] [place]. Please see a copy of my visit note below.    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  Past medical history includes CAD status post CABG 7/2021, type II diabetes, HTN, and CKD.     #. 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 Hx of HBcAb positive donor   Patient with normal AST, ALT, alkaline phosphatase and total bilirubin -this suggests intact graft synthetic function.  Patient on mycophenolate and prednisone given the concern for calcineurin inhibitor induced nephrotoxicity    #. HCC recurrence post transplant  Followed by Dr. Michael Villarreal  Currently on sorafenib.  Imaging 12/2024 without evidence of active disease    #. 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  The patient's serum creatinine improved the following week upon retest.  The patient has an estimated GFR of 38    Plan  -- Continue mycophenolate 500 mg twice daily and prednisone 5 milligrams daily; suppression monitoring per protocol  -- Continue lamivudine given hepatitis B core Ab positive donor  -- HCC management per oncology    Health Maintenance:  - Colonoscopy: due 2029  - Skin Checks: due yearly. SPF35+ at all times  - Lipids: due yearly   - Vaccinations: recommend vaccinations per guidelines, including influenza vaccination yearly    RTC: 12 months    Cecilia Amaya MD (Lizzie)  Advanced & Transplant Hepatology  [facility]    I spent 60 minutes on this encounter performing the following: reviewing the patient's medical record  (clinic visits, hospital records, lab results, imaging and procedural documentation), history taking, physical exam and documentation on the date of the encounter. I also spent part of the time in coordination of care and counseling.    The longitudinal plan of care for the diagnosis(es)/condition(s) as documented were addressed during this visit. Due to the added complexity in care, I will continue to support Miller in the subsequent management and with ongoing continuity of care.    HPI:  MASLD + HCC s/p DBD liver transplant 11/11/2019  - mikaela-op MELD= 12  - donor- donor age 63/local//ECD  - Donor: hep B core positive. CMV D+/R-; EBV D+/R+  - operation- CiT 7:39, EBL 2L, piggyback IVC, duct-to-duct biliary anastomosis  - Explant: moderately differentiated HCC with 5-10% residual/recurrence. Necrotic segment 4 HCC. No evidence of vascular invasion  - post-op course  HCC recurrence: 7/13/2203  MMF colitis Dec 2019  CNI renal toxicity  Skin cancer: SCC in situ (12/2022)  Rejection: none  Biliary issues: none  - immunosuppression- MMF 500mg Q12, pred 5   * Switched from FK --> MMF + prednisone 2021     HCC  - dx Dec 2018  - MRI liver 12/23/18- 3.1cm lesion segment IVB, 1.1cm lesion segment III  - TACE 1/22/19 (seg IV)  - Microwave ablation 1/23/19 (seg III)  - Explant: moderately differentiated HCC with 5-10% residual/recurrence. Necrotic segment 4 HCC. No evidence of vascular invasion  - 7/13/2023: Peritoneal recurrence.  HCC poorly differentiated. AFP 26.7   - 7/20/2023: Started on sorafenib    Patient presented alone.     He contracted RSV in October 2023.  He reports that since then he feels as though he has not recovered and has had recurrent viral infections.  He reports that he has recently had issues with nasal and chest congestion as well as coughing but this is slowly improving.  He follows with his primary care doctor for this.    Yesterday he was able to walk 12,000 steps.  He lifts and does [employer]  every other day.     He reports that since starting sorafenib he has had issues with loose stools.  He has 2-3 bowel movements per day.  No blood or mucus.  He takes about 8 Lomotil pills throughout the day with unclear benefit.    Typically eats about 2 meals per day.    No alcohol since 1996.  No chewing tobacco since 2009.    Medical hx Surgical hx   Past Medical History:   Diagnosis Date     Anemia 2013     Arthritis      BPH (benign prostatic hyperplasia)      CAD (coronary artery disease) 04/01/2019     Cholelithiasis      Conductive hearing loss 08/16/2017     Depressive disorder 1986    Suffer effects throughout life     Gastroesophageal reflux disease 12/01/2014     HCC (hepatocellular carcinoma) (H) 01/22/2019     History of blood transfusion 2019    Had blood transfussion until Dec 2022     History of diabetic retinopathy 07/2018     HTN (hypertension) 11/20/2019     Hyperlipidemia      Liver cirrhosis secondary to ESTRADA (H)      Liver transplanted (H) 11/11/2019     Portal vein thrombosis 04/11/2019     SCC (squamous cell carcinoma) 02/15/2023    02/15/23:  Left temporal scalp     Type II diabetes mellitus (H)       Past Surgical History:   Procedure Laterality Date     ABDOMEN SURGERY  11/11/2019    Had Liver Transplant     BIOPSY  12/2022    Had biopsy done will have additional procedure 2/15/2023     BYPASS GRAFT ARTERY CORONARY N/A 07/14/2021    Procedure: median sternotomy, on cardiopulmonary bypass, CORONARY ARTERY BYPASS GRAFT (CABG) x2 with left greater saphenous vein endoscopic harvest and left internal mammery artery harvest;  Surgeon: Tom Zapata MD;  Location:  OR     CARDIAC SURGERY  7/2021    Heart Graph. and bypass surgery     CHOLECYSTECTOMY  11/11/2022    Removed with Liver Transplant     COLONOSCOPY      2015     COLONOSCOPY N/A 12/06/2019    Procedure: COLONOSCOPY, WITH POLYPECTOMY AND BIOPSY;  Surgeon: Adam Morton MD;  [facility]: [facility] GI     CV CENTRAL VENOUS CATHETER  PLACEMENT N/A 07/12/2021    Procedure: Central Venous Catheter Placement;  Surgeon: Fermin Polanco MD;  Location:  HEART CARDIAC CATH LAB     CV CORONARY ANGIOGRAM N/A 07/12/2021    Procedure: Coronary Angiogram;  Surgeon: Fermin Polanco MD;  Location:  HEART CARDIAC CATH LAB     CV HEART CATHETERIZATION WITH POSSIBLE INTERVENTION N/A 02/26/2019    Procedure: CORS;  Surgeon: Jagdish Hoyt MD;  Location:  HEART CARDIAC CATH LAB     CV INTRA AORTIC BALLOON N/A 07/12/2021    Procedure: Intra Aortic Balloon Pump Insertion;  Surgeon: Fermin Polanco MD;  Location: [facility] CARDIAC CATH LAB     ESOPHAGOSCOPY, GASTROSCOPY, DUODENOSCOPY (EGD), COMBINED N/A 11/17/2016    Procedure: COMBINED ESOPHAGOSCOPY, GASTROSCOPY, DUODENOSCOPY (EGD);  Surgeon: Santi Rosas MD;  Location:  GI     ESOPHAGOSCOPY, GASTROSCOPY, DUODENOSCOPY (EGD), COMBINED N/A 11/17/2017    Procedure: COMBINED ESOPHAGOSCOPY, GASTROSCOPY, DUODENOSCOPY (EGD);  EGD;  Surgeon: Santi Rosas MD;  Location:  GI     ESOPHAGOSCOPY, GASTROSCOPY, DUODENOSCOPY (EGD), COMBINED N/A 12/28/2018    Procedure: EGD;  Surgeon: Santi Rosas MD;  Location:  OR     ESOPHAGOSCOPY, GASTROSCOPY, DUODENOSCOPY (EGD), COMBINED N/A 12/06/2019    Procedure: ESOPHAGOGASTRODUODENOSCOPY, WITH BIOPSY;  Surgeon: Adam Morton MD;  Location:  GI     ESOPHAGOSCOPY, GASTROSCOPY, DUODENOSCOPY (EGD), COMBINED N/A 02/13/2020    Procedure: ESOPHAGOGASTRODUODENOSCOPY (EGD);  Surgeon: Santi Rosas MD;  Location:  GI     EXCISE MASS ABDOMEN N/A 07/13/2023    Procedure: Laparoscopic lysis of adhesions, laparoscopic resection of abdominal mass;  Surgeon: Ruiz Chu MD;  Location:  OR     HEAD & NECK SURGERY      12/2017 at [facility].      IMPLANT GOLD WEIGHT EYELID Right 11/16/2017    Procedure: IMPLANT WEIGHT EYELID;  Right Upper Eyelid Weight, right tarsal strip lower eyelid;   Surgeon: Milana Malave MD;  [facility] OR     IR CHEMO EMBOLIZATION  2019     KNEE SURGERY Left      ORTHOPEDIC SURGERY       PAROTIDECTOMY, RADICAL NECK DISSECTION Right 2017    Procedure: PAROTIDECTOMY, RADICAL NECK DISSECTION;  Right Superfacial Parotidectomy , Facial nerve repair. with [facility] facial nerve monitor.;  Surgeon: Asiya Morgan MD;  [facility] OR     PHACOEMULSIFICATION CLEAR CORNEA WITH STANDARD INTRAOCULAR LENS IMPLANT Right 2023    Procedure: PHACOEMULSIFICATION, COMPLEX CATARACT, WITH INTRAOCULAR LENS IMPLANT WITH TRYPAN RIGHT EYE;  Surgeon: Enirqueta Martin MD;  Location: [facility] OR     PHACOEMULSIFICATION WITH STANDARD INTRAOCULAR LENS IMPLANT Left 2023    Procedure: PHACOEMULSIFICATION, COMPLEX CATARACT, WITH STANDARD INTRAOCULAR LENS IMPLANT INSERTION LEFT EYE;  Surgeon: Enriqueta Martin MD;  [facility]: [facility] OR     PICC INSERTION Left 2017    4fr SL BioFlo PICC, 44cm in the L basilic vein w/ tip in the low SVC     RETURN LIVER TRANSPLANT N/A 2019    Procedure: Exploratory laparotomy, hematoma evacuation, abdominal washout;  Surgeon: Александр Ramos MD;  [facility] OR     TRANSPLANT LIVER RECIPIENT  DONOR N/A 2019    Procedure: TRANSPLANT, LIVER, RECIPIENT,  DONOR;  Surgeon: Александр Ramos MD;  [facility] OR     VASCULAR SURGERY            Medications  Current Outpatient Medications   Medication Sig Dispense Refill     lamiVUDine (EPIVIR) 100 MG tablet Take 1 tablet (100 mg) by mouth daily. 90 tablet 2     mycophenolate (GENERIC EQUIVALENT) 250 MG capsule Take 2 capsules (500 mg) by mouth 2 times daily. 120 capsule 11     predniSONE (DELTASONE) 5 MG tablet Take 1 tablet (5 mg) by mouth daily. 30 tablet 11     acetaminophen (TYLENOL) 325 MG tablet TAKE 1 TABLET BY MOUTH EVERY SIX HOURS AS NEEDED FOR MILD PAIN 100 tablet 3     albuterol (PROAIR HFA/PROVENTIL HFA/VENTOLIN HFA) 108 (90 Base) MCG/ACT inhaler  Inhale 2 puffs into the lungs every 4 hours as needed for shortness of breath, wheezing or cough. 18 g 3     ammonium lactate (AMLACTIN) 12 % external cream APPLY TOPICALLY DAILY AS NEEDED FOR DRY SKIN (ON FEET) 140 g 5     benzoyl peroxide 5 % external liquid Use topically in showers as a body wash 226 g 5     blood glucose (NO BRAND SPECIFIED) lancets standard Use to test blood sugar 1 times daily or as directed. 100 each 11     calcium carbonate (TUMS) 500 MG chewable tablet Take 1 tablet (500 mg) by mouth 2 times daily. 180 tablet 3     Continuous Glucose  (FREESTYLE CLAUDIA 3 READER) CECILIO 1 each continuously. use to read blood sugar per 's instructions 1 each 0     Continuous Glucose  (FREESTYLE CLAUDIA 3 READER) CECILIO 1 each See Admin Instructions. use to read blood sugar per  instructions 1 each 0     Continuous Glucose Sensor (FREESTYLE CLAUDIA 3 PLUS SENSOR) MISC Change every 15 days. 6 each 1     diphenoxylate-atropine (LOMOTIL) 2.5-0.025 MG tablet TAKE 2 TABLETS BY MOUTH FOUR TIMES A DAY AS NEEDED FOR DIARRHEA 60 tablet 3     ELIQUIS ANTICOAGULANT 5 MG tablet TAKE 1 TABLET BY MOUTH TWICE A DAY 60 tablet 3     furosemide (LASIX) 20 MG tablet Take 1 tablet (20 mg) by mouth as needed (for edema). 90 tablet 3     insulin degludec (TRESIBA FLEXTOUCH) 100 UNIT/ML pen INJECT 14 UNITS SUBCUTANEOUSLY ONCE DAILY 15 mL 3     insulin pen needle (BD VIKTORIA U/F) 32G X 4 MM miscellaneous USE 5 PER  each 3     JARDIANCE 10 MG TABS tablet TAKE 1 TABLET BY MOUTH DAILY 30 tablet 11     ketoconazole (NIZORAL) 2 % external shampoo APPLY A THIN LAYER TOPICALLY TO SCALP IN SHOWER, LEAVE ON 5 MIN PRIOR TO RINSE (MAY ALSO USE AS BODY WASH) 120 mL 5     lisinopril (ZESTRIL) 10 MG tablet Take 1 tablet (10 mg) by mouth daily. 30 tablet 11     LOKELMA 10 g PACK packet DISSOLVE AND TAKE ONE PACKET BY MOUTH THREE TIMES A DAY FOR 2 DAYS, THEN DISSOLVE AND TAKE ONE PACKET BY MOUTH DAILY. 30 packet 10      loperamide (IMODIUM) 2 MG capsule Take 1 capsule (2 mg) by mouth 4 times daily as needed for diarrhea. 120 capsule 3     MAGNESIUM OXIDE 400 (240 Mg) MG tablet TAKE 1 TABLET BY MOUTH DAILY 30 tablet 5     metoprolol succinate ER (TOPROL XL) 25 MG 24 hr tablet TAKE 1 TABLET BY MOUTH DAILY 30 tablet 3     Multiple Vitamin (DAILY-GLADIS MULTIVITAMIN) TABS TAKE 1 TABLET BY MOUTH ONCE DAILY 30 tablet 11     NIFEdipine ER OSMOTIC (PROCARDIA XL) 60 MG 24 hr tablet TAKE 1 TABLET BY MOUTH TWICE A DAY 60 tablet 3     omega 3 1000 MG CAPS Take 2,000 mg by mouth 2 times daily. 120 capsule 11     ondansetron (ZOFRAN ODT) 8 MG ODT tab Take 1 tablet (8 mg) by mouth every 8 hours as needed for nausea 15 tablet 3     pantoprazole (PROTONIX) 40 MG EC tablet TAKE 1 TABLET BY MOUTH ONCE DAILY BEFORE BREAKFAST 90 tablet 3     phosphorus tablet 250 mg 250 MG per tablet Take 1 tablet (250 mg) by mouth 4 times daily. 120 tablet 1     prednisoLONE acetate (PRED FORTE) 1 % ophthalmic suspension  (Patient not taking: No sig reported)       rosuvastatin (CRESTOR) 20 MG tablet Take 1 tablet (20 mg) by mouth daily. 30 tablet 11     SORAfenib (NEXAVAR) 200 MG tablet Take 1 tablet (200 mg) by mouth 2 times daily. On an empty stomach 1 hour before or 2 hours after a meal. 60 tablet 0     tamsulosin (FLOMAX) 0.4 MG capsule TAKE 1 CAPSULE BY MOUTH ONCE DAILY 30 capsule 11     triamcinolone (KENALOG) 0.1 % external cream Apply topically 2 times daily. 80 g 0     UltiCare Alcohol Swabs 70 % PADS 1 each by Other route 4 times daily 400 each 1     VITAMIN D3 50 MCG (2000 UT) tablet TAKE 1 TABLET BY MOUTH ONCE DAILY 30 tablet 8       Allergies  Allergies   Allergen Reactions     Codeine Other (See Comments)     Cannot take due to liver  Cannot tolerate oral narcotics     Seasonal Allergies      Sneezing, coughing, runny and itchy eyes       Family hx Social hx   Family History   Problem Relation Age of Onset     Skin Cancer Mother      Cancer Mother      "    mastectomy     Diabetes Mother          3/2016     Cerebrovascular Disease Mother         Passed away in Feb of this year, 80 years old.     Thyroid Disease Mother      Depression Mother      Breast Cancer Mother      Obesity Mother         280 pounds  from this and complications from D     Pancreatic Cancer Father 60     Prostate Cancer Father         Currently in Remission     Macular Degeneration Father      Glaucoma Father      Skin Cancer Father      Coronary Artery Disease Father         Started in his 70's  at 88 [place]      Colon Cancer Father      Thyroid Disease Sister      Depression Sister      Asthma Sister      Sleep Apnea Brother      Colorectal Cancer Maternal Grandmother      Cancer Maternal Grandmother      Substance Abuse Maternal Grandmother         Alcohol     Prostate Cancer Maternal Grandfather      Substance Abuse Maternal Grandfather         Alcohol     Colorectal Cancer Paternal Grandmother      Asthma Sister         Had since birth     Liver Disease No family hx of      Melanoma No family hx of      Anesthesia Reaction No family hx of      Deep Vein Thrombosis (DVT) No family hx of       Social History     Tobacco Use     Smoking status: Former     Types: Dip, chew, snus or snuff     Passive exposure: Past     Smokeless tobacco: Former     Types: Chew     Quit date: 10/31/2017     Tobacco comments:     Quit [place] after Doctor told me in  that if I didn't I would   Vaping Use     Vaping status: Never Used   Substance Use Topics     Alcohol use: No     Comment: quit 1996     Drug use: No     - Lives alone.  .  - Alcohol: Quit in   - Tobacco: Former chewing tobacco use, quit      Review of systems  A 10-point review of systems was negative.    Examination  /74   Pulse 86   Temp 98.5  F (36.9  C) (Oral)   Ht 1.753 m (5' 9\")   Wt 76.2 kg (168 lb)   SpO2 94%   BMI 24.81 kg/m     Body mass index is 24.81 " kg/m .    Gen-NAD  Eye-no conjunctival icterus  ENT-missing teeth on top and bottom; no dentures  CVS- RRR, no murmurs  RS- CTA bilaterally  Abd-soft, nontender, nondistended  Extr- 2+ radial pulses bilaterally  MS- hands without clubbing  Skin- no jaundice  Psych- normal mood    Laboratory  BMP  Recent Labs   Lab Test 02/21/25  0850 02/18/25  1354 12/18/24  1336 12/13/24  1007    137 140 141   POTASSIUM 5.4* 4.8 5.6* 4.9   CHLORIDE 101 101 105 106   DEBORAH 7.8* 7.5* 8.2* 7.6*   CO2 23 21* 21* 22   BUN 43.1* 63.0* 37.2* 31.0*   CR 1.98* 2.35* 1.68* 1.65*   * 116* 215* 142*     CBC  Recent Labs   Lab Test 02/18/25  1354 12/18/24  1336 12/13/24  1007 12/11/24  1300   WBC 6.1 6.4 5.8 6.7   RBC 3.10* 3.09* 3.43* 3.29*   HGB 9.4* 9.2* 10.2* 9.9*   HCT 30.3* 30.4* 33.3* 31.7*   MCV 98 98 97 96   MCH 30.3 29.8 29.7 30.1   MCHC 31.0* 30.3* 30.6* 31.2*   RDW 15.0 15.7* 15.9* 15.9*    151 184 169     Liver Enzymes   Recent Labs   Lab Test 02/18/25  1354   PROTTOTAL 7.2   ALBUMIN 4.1   BILITOTAL 0.3   ALKPHOS 83   AST 29   ALT 28      INR   INR   Date Value Ref Range Status   08/11/2024 1.11 0.85 - 1.15 Final   01/24/2020 1.09 0.86 - 1.14 Final         Radiology  CT Abdomen with and without contrast 12/4/2024  1. Postsurgical changes of liver transplant without evidence of  recurrent hepatocellular carcinoma.  2. Stable scattered peritoneal nodules.  3. Stable hypodense lesion within the pancreatic head, possibly  representing a sidebranch IPMN.  4. Stable 4 mm pulmonary nodule.    Endoscopy  Colonoscopy 2019  - No endoscopic source for diarrhea, thus random                        biopsies were taken.                        - The entire examined colon is normal. Biopsied to                        evaluate for MMF.                        - The examined portion of the ileum was normal, except                        for mild patchy erythema.          Again, thank you for allowing me to participate in the care of  your patient.        Sincerely,        Cecilia Amaya MD    Electronically signed

## 2025-03-12 ENCOUNTER — OFFICE VISIT (OUTPATIENT)
Dept: OPHTHALMOLOGY | Facility: CLINIC | Age: 60
End: 2025-03-12
Attending: OPHTHALMOLOGY
Payer: MEDICARE

## 2025-03-12 DIAGNOSIS — E11.3593 PROLIFERATIVE DIABETIC RETINOPATHY OF BOTH EYES ASSOCIATED WITH TYPE 2 DIABETES MELLITUS, UNSPECIFIED PROLIFERATIVE RETINOPATHY TYPE (H): ICD-10-CM

## 2025-03-12 DIAGNOSIS — H26.491 PCO (POSTERIOR CAPSULAR OPACIFICATION), RIGHT: Primary | ICD-10-CM

## 2025-03-12 PROCEDURE — 92235 FLUORESCEIN ANGRPH MLTIFRAME: CPT | Performed by: OPHTHALMOLOGY

## 2025-03-12 PROCEDURE — 99213 OFFICE O/P EST LOW 20 MIN: CPT | Mod: 57 | Performed by: OPHTHALMOLOGY

## 2025-03-12 PROCEDURE — 66821 AFTER CATARACT LASER SURGERY: CPT | Mod: RT | Performed by: OPHTHALMOLOGY

## 2025-03-12 PROCEDURE — G0463 HOSPITAL OUTPT CLINIC VISIT: HCPCS | Performed by: OPHTHALMOLOGY

## 2025-03-12 PROCEDURE — 92134 CPTRZ OPH DX IMG PST SGM RTA: CPT | Performed by: OPHTHALMOLOGY

## 2025-03-12 PROCEDURE — 2022F DILAT RTA XM EVC RTNOPTHY: CPT | Performed by: OPHTHALMOLOGY

## 2025-03-12 PROCEDURE — 250N000009 HC RX 250: Performed by: OPHTHALMOLOGY

## 2025-03-12 RX ADMIN — APRACLONIDINE HYDROCHLORIDE OPHTHALMIC SOLUTION 1 DROP: 10 SOLUTION/ DROPS OPHTHALMIC at 15:49

## 2025-03-12 ASSESSMENT — VISUAL ACUITY
OS_CC: 20/25
CORRECTION_TYPE: GLASSES
METHOD: SNELLEN - LINEAR
OD_CC+: -2
OD_CC: 20/25
OS_CC+: -2

## 2025-03-12 ASSESSMENT — REFRACTION_WEARINGRX
SPECS_TYPE: PAL
OS_AXIS: 080
OD_ADD: +2.75
OD_CYLINDER: +0.50
OS_SPHERE: -0.50
OD_AXIS: 180
OS_CYLINDER: +0.25
OD_SPHERE: -0.50
OS_ADD: +2.75

## 2025-03-12 ASSESSMENT — TONOMETRY
OD_IOP_MMHG: 22
OS_IOP_MMHG: 18
IOP_METHOD: TONOPEN
IOP_METHOD: TONOPEN
OD_IOP_MMHG: 22

## 2025-03-12 ASSESSMENT — SLIT LAMP EXAM - LIDS
COMMENTS: SMALL PAPILLOMA ALONG LL MARGIN, NON CONCERNING
COMMENTS: NORMAL

## 2025-03-12 ASSESSMENT — EXTERNAL EXAM - LEFT EYE: OS_EXAM: NORMAL

## 2025-03-12 ASSESSMENT — EXTERNAL EXAM - RIGHT EYE: OD_EXAM: NORMAL

## 2025-03-12 ASSESSMENT — CUP TO DISC RATIO
OS_RATIO: 0.4
OD_RATIO: 0.3

## 2025-03-12 NOTE — NURSING NOTE
Chief Complaints and History of Present Illnesses   Patient presents with    Follow Up     Proliferative diabetic retinopathy of both eyes with macular edema associated with type 2 diabetes mellitus      Chief Complaint(s) and History of Present Illness(es)       Follow Up              Comments: Proliferative diabetic retinopathy of both eyes with macular edema associated with type 2 diabetes mellitus               Comments    Pt states no change in VA since last visit  No flashes or floaters   No eye pain or redness  LBS: 201    Last A1C:  5.6  Lab Results       Component                Value               Date                       A1C                      5.6                 12/04/2024                 A1C                      5.6                 09/05/2024                 A1C                      5.6                 05/13/2024                 A1C                      5.7                 12/18/2023                 A1C                      6.3                 06/14/2023                 A1C                      6.9                 12/14/2022                 A1C                      6.0                 12/30/2020                 A1C                      6.3                 06/13/2020                 A1C                      6.6                 08/08/2018                 A1C                      6.5                 06/09/2017                 A1C                      7.8                 10/25/2016        Laurel Cristina COT 1:49 PM March 12, 2025

## 2025-03-12 NOTE — PROGRESS NOTES
CC:  Follow up Diabetic retinopathy     Interval: Pt states no change in VA since last visit.  No flashes or floaters No eye pain or redness LBS: 201 Last A1C: 5.6 Lab Results Component Value Date A1C 5.6 12/04/2024.   Still on cancer treatment 2x/day getting scans again tomorrow.    HPI: Frandy Workman is a 60 year old man with POH of PDR OS and severe NPDR OD who presents for follow up. PMH includescirrhosis + HCC s/p transplantation 11/11/2019 with posttransplant course complicated by HCC recurrence and calcineurin induced renal toxicity. Past medical history includes CAD status post CABG 7/2021, type II diabetes, HTN, and CKD.      Retinal Imaging:  Optical Coherence Tomography 03/12/25    RE: hyaloid on, no IRF, normal contour/laminations  LE:  stage 1 ERM, stable trace noncentral IRF    fluorescein angiography 09/13/23  both eyes normal AV and choroidal filling ou. Staining laser scars. No obvious NVE, mild late macula leakage. peripheral leakage and capillary non perfusion.     Optos FA 03/12/25   OD: mild burden of MA; some late small vessel leakage parafoveal and more diffusely; no NV  OS: mild burden of MA; some late small vessel leakage parafoveal and more diffusely; no NV    ASSESSMENT:     #T2DM   - HbA1c 5.6% (12/2024)  - Blood pressure (<120/80) and blood glucose (HbA1c <7.0, ~6.5 today) control discussed with patient. Patient advised that failure to adequately control each may lead to vision loss. The patient expressed understanding.    # Proliferative diabetic retinopathy left eye  # pre-proliferative diabetic retinopathy right eye    # vitreous hemorrhage left eye 10/12/22   Status post Eylea inj left eye   Status post Panretinal laser photocoagulation (PRP) 9.28.22 left eye and Status post scatter PRP right eye 11/02/22     # Diabetic macular edema both eyes   - status post avastin in both eyes; Switch to Eylea 4/24/19 with improvement of Diabetic macular edema     - Tried holding off on  injection 3/29/23, but edema worsened both eyes  - last Eylea right eye; 8/02/23 interval improvement 09/13/23 (9 weeks prior)  -  discussed with to T&E vs closed observation on 9/13/23, He preferred to observe   - Given mild worsening 10/04/23 changed to Vabysmo right eye (10/04/23 #1; 1.10.23 #3)  - 03/12/25 VA 20/25 OU; only tr noncentral DME OU; FA without NV; monitor without injections  - Last injections not since 3/2024; continue to monitor and treat PRN OU    # CEIOL OU  Right eye: 01/24/23; left eye 01/3/23  With posterior capsular opacity (PCO) right eye worse than left eye  YAG OD 03/12/25   Risks, benefits and alternatives discussed with patient and agreed to proceed  No complications  Follow up in 1-2 weeks    # Dry eye syndrome   Left eye worse than right eye   warm compresses and artificial tears  As needed  - punctal plugs previously left eye - reports this is better     # Status post liver transplant  - tx 11/2019  - severe anemia; s/p transfusion - anemia much improved   - being seen by his primary team    PLAN:  Last dilation 03/12/25   Treat PRN OU  RTC 1 month: VT, dilate OS only, YAG OS   RTC 2 months: VTD, OCT mac, optos     Matty Salas MD  Vitreoretinal Surgery Fellow     ~~~~~~~~~~~~~~~~~~~~~~~~~~~~~~~~~~   Complete documentation of historical and exam elements from today's encounter can be found in the full encounter summary report (not reduplicated in this progress note).  I personally obtained the chief complaint(s) and history of present illness.  I confirmed and edited as necessary the review of systems, past medical/surgical history, family history, social history, and examination findings as documented by others; and I examined the patient myself.  I personally reviewed the relevant tests, images, and reports as documented above.  I personally reviewed the ophthalmic test(s) associated with this encounter, agree with the interpretation(s) as documented by the resident/fellow, and  have edited the corresponding report(s) as necessary.   I formulated and edited as necessary the assessment and plan and discussed the findings and management plan with the patient and family and No resident or fellow assisted with the procedures performed.  I performed the procedures myself.    Enriqueta Martin MD   of Ophthalmology.  Retina Service   Department of Ophthalmology and Visual Neurosciences   [facility]  Phone: [phone]   Fax: [phone]

## 2025-03-13 ENCOUNTER — ANCILLARY PROCEDURE (OUTPATIENT)
Dept: CT IMAGING | Facility: CLINIC | Age: 60
End: 2025-03-13
Attending: INTERNAL MEDICINE
Payer: MEDICARE

## 2025-03-13 ENCOUNTER — LAB (OUTPATIENT)
Dept: LAB | Facility: CLINIC | Age: 60
End: 2025-03-13
Payer: MEDICARE

## 2025-03-13 DIAGNOSIS — Z94.4 LIVER REPLACED BY TRANSPLANT (H): ICD-10-CM

## 2025-03-13 DIAGNOSIS — N18.32 ANEMIA OF CHRONIC RENAL FAILURE, STAGE 3B (H): ICD-10-CM

## 2025-03-13 DIAGNOSIS — E11.3293 TYPE 2 DIABETES MELLITUS WITH MILD NONPROLIFERATIVE RETINOPATHY OF BOTH EYES WITHOUT MACULAR EDEMA, UNSPECIFIED WHETHER LONG TERM INSULIN USE (H): ICD-10-CM

## 2025-03-13 DIAGNOSIS — N18.32 STAGE 3B CHRONIC KIDNEY DISEASE (H): ICD-10-CM

## 2025-03-13 DIAGNOSIS — D63.1 ANEMIA OF CHRONIC RENAL FAILURE, STAGE 3B (H): ICD-10-CM

## 2025-03-13 DIAGNOSIS — C22.0 HCC (HEPATOCELLULAR CARCINOMA) (H): ICD-10-CM

## 2025-03-13 DIAGNOSIS — Z94.4 LIVER TRANSPLANT RECIPIENT (H): ICD-10-CM

## 2025-03-13 DIAGNOSIS — I82.452 ACUTE DEEP VEIN THROMBOSIS (DVT) OF LEFT PERONEAL VEIN (H): ICD-10-CM

## 2025-03-13 DIAGNOSIS — C78.6 MALIGNANT NEOPLASM METASTATIC TO PERITONEUM (H): ICD-10-CM

## 2025-03-13 LAB
AFP SERPL-MCNC: 38.2 NG/ML
ALBUMIN SERPL BCG-MCNC: 4.3 G/DL (ref 3.5–5.2)
ALP SERPL-CCNC: 92 U/L (ref 40–150)
ALT SERPL W P-5'-P-CCNC: 25 U/L (ref 0–70)
ANION GAP SERPL CALCULATED.3IONS-SCNC: 14 MMOL/L (ref 7–15)
AST SERPL W P-5'-P-CCNC: 24 U/L (ref 0–45)
BASOPHILS # BLD AUTO: 0 10E3/UL (ref 0–0.2)
BASOPHILS NFR BLD AUTO: 0 %
BILIRUB SERPL-MCNC: 0.3 MG/DL
BUN SERPL-MCNC: 43.3 MG/DL (ref 8–23)
CALCIUM SERPL-MCNC: 7.7 MG/DL (ref 8.8–10.4)
CHLORIDE SERPL-SCNC: 98 MMOL/L (ref 98–107)
CREAT BLD-MCNC: 2 MG/DL (ref 0.7–1.2)
CREAT SERPL-MCNC: 1.71 MG/DL (ref 0.67–1.17)
EGFRCR SERPLBLD CKD-EPI 2021: 37 ML/MIN/1.73M2
EGFRCR SERPLBLD CKD-EPI 2021: 45 ML/MIN/1.73M2
EOSINOPHIL # BLD AUTO: 0 10E3/UL (ref 0–0.7)
EOSINOPHIL NFR BLD AUTO: 1 %
ERYTHROCYTE [DISTWIDTH] IN BLOOD BY AUTOMATED COUNT: 14.6 % (ref 10–15)
GLUCOSE SERPL-MCNC: 86 MG/DL (ref 70–99)
HCO3 SERPL-SCNC: 22 MMOL/L (ref 22–29)
HCT VFR BLD AUTO: 30.8 % (ref 40–53)
HGB BLD-MCNC: 9.5 G/DL (ref 13.3–17.7)
IMM GRANULOCYTES # BLD: 0.1 10E3/UL
IMM GRANULOCYTES NFR BLD: 1 %
LYMPHOCYTES # BLD AUTO: 0.6 10E3/UL (ref 0.8–5.3)
LYMPHOCYTES NFR BLD AUTO: 10 %
MAGNESIUM SERPL-MCNC: 1.3 MG/DL (ref 1.7–2.3)
MCH RBC QN AUTO: 30.4 PG (ref 26.5–33)
MCHC RBC AUTO-ENTMCNC: 30.8 G/DL (ref 31.5–36.5)
MCV RBC AUTO: 98 FL (ref 78–100)
MONOCYTES # BLD AUTO: 0.3 10E3/UL (ref 0–1.3)
MONOCYTES NFR BLD AUTO: 4 %
NEUTROPHILS # BLD AUTO: 5 10E3/UL (ref 1.6–8.3)
NEUTROPHILS NFR BLD AUTO: 84 %
NRBC # BLD AUTO: 0 10E3/UL
NRBC BLD AUTO-RTO: 0 /100
PHOSPHATE SERPL-MCNC: 3.4 MG/DL (ref 2.5–4.5)
PLATELET # BLD AUTO: 186 10E3/UL (ref 150–450)
POTASSIUM SERPL-SCNC: 4.6 MMOL/L (ref 3.4–5.3)
PROT SERPL-MCNC: 7.1 G/DL (ref 6.4–8.3)
RBC # BLD AUTO: 3.13 10E6/UL (ref 4.4–5.9)
SODIUM SERPL-SCNC: 134 MMOL/L (ref 135–145)
WBC # BLD AUTO: 6 10E3/UL (ref 4–11)

## 2025-03-13 PROCEDURE — 99000 SPECIMEN HANDLING OFFICE-LAB: CPT | Performed by: PATHOLOGY

## 2025-03-13 PROCEDURE — 71260 CT THORAX DX C+: CPT | Mod: GC | Performed by: RADIOLOGY

## 2025-03-13 PROCEDURE — 82105 ALPHA-FETOPROTEIN SERUM: CPT | Performed by: INTERNAL MEDICINE

## 2025-03-13 PROCEDURE — 74178 CT ABD&PLV WO CNTR FLWD CNTR: CPT | Mod: GC | Performed by: RADIOLOGY

## 2025-03-13 RX ORDER — IOPAMIDOL 755 MG/ML
87 INJECTION, SOLUTION INTRAVASCULAR ONCE
Status: COMPLETED | OUTPATIENT
Start: 2025-03-13 | End: 2025-03-13

## 2025-03-13 RX ADMIN — IOPAMIDOL 87 ML: 755 INJECTION, SOLUTION INTRAVASCULAR at 15:52

## 2025-03-13 NOTE — DISCHARGE INSTRUCTIONS

## 2025-03-27 ENCOUNTER — TELEPHONE (OUTPATIENT)
Dept: ONCOLOGY | Facility: CLINIC | Age: 60
End: 2025-03-27

## 2025-03-27 ENCOUNTER — VIRTUAL VISIT (OUTPATIENT)
Dept: ONCOLOGY | Facility: CLINIC | Age: 60
End: 2025-03-27
Attending: INTERNAL MEDICINE
Payer: MEDICARE

## 2025-03-27 ENCOUNTER — OFFICE VISIT (OUTPATIENT)
Dept: OPHTHALMOLOGY | Facility: CLINIC | Age: 60
End: 2025-03-27
Attending: OPHTHALMOLOGY
Payer: MEDICARE

## 2025-03-27 VITALS — WEIGHT: 154 LBS | BODY MASS INDEX: 22.81 KG/M2 | HEIGHT: 69 IN

## 2025-03-27 DIAGNOSIS — Z94.4 LIVER TRANSPLANT RECIPIENT (H): ICD-10-CM

## 2025-03-27 DIAGNOSIS — C78.6 MALIGNANT NEOPLASM METASTATIC TO PERITONEUM (H): ICD-10-CM

## 2025-03-27 DIAGNOSIS — N18.32 STAGE 3B CHRONIC KIDNEY DISEASE (H): ICD-10-CM

## 2025-03-27 DIAGNOSIS — Z48.810 AFTERCARE FOLLOWING SURGERY OF A SENSORY ORGAN: Primary | ICD-10-CM

## 2025-03-27 DIAGNOSIS — C22.0 HCC (HEPATOCELLULAR CARCINOMA) (H): Primary | ICD-10-CM

## 2025-03-27 PROBLEM — R07.9 CHEST PAIN, UNSPECIFIED TYPE: Status: RESOLVED | Noted: 2022-01-11 | Resolved: 2025-03-27

## 2025-03-27 PROBLEM — N17.9 ACUTE RENAL FAILURE, UNSPECIFIED ACUTE RENAL FAILURE TYPE: Status: RESOLVED | Noted: 2023-08-10 | Resolved: 2025-03-27

## 2025-03-27 PROBLEM — I12.0 HYPERTENSIVE CHRONIC KIDNEY DISEASE WITH STAGE 5 CHRONIC KIDNEY DISEASE OR END STAGE RENAL DISEASE (H): Status: RESOLVED | Noted: 2023-02-22 | Resolved: 2025-03-27

## 2025-03-27 PROCEDURE — 1125F AMNT PAIN NOTED PAIN PRSNT: CPT | Mod: 95 | Performed by: INTERNAL MEDICINE

## 2025-03-27 PROCEDURE — G0463 HOSPITAL OUTPT CLINIC VISIT: HCPCS | Performed by: OPHTHALMOLOGY

## 2025-03-27 PROCEDURE — G2211 COMPLEX E/M VISIT ADD ON: HCPCS | Mod: 95 | Performed by: INTERNAL MEDICINE

## 2025-03-27 PROCEDURE — 98007 SYNCH AUDIO-VIDEO EST HI 40: CPT | Performed by: INTERNAL MEDICINE

## 2025-03-27 ASSESSMENT — REFRACTION_WEARINGRX
OD_SPHERE: -0.50
OD_AXIS: 180
OD_CYLINDER: +0.50
SPECS_TYPE: PAL
OD_ADD: +2.75
OS_SPHERE: -0.50
OS_CYLINDER: +0.25
OS_AXIS: 080
OS_ADD: +2.75

## 2025-03-27 ASSESSMENT — TONOMETRY
IOP_METHOD: TONOPEN
OS_IOP_MMHG: 17
OD_IOP_MMHG: 22

## 2025-03-27 ASSESSMENT — SLIT LAMP EXAM - LIDS
COMMENTS: SMALL PAPILLOMA ALONG LL MARGIN, NON CONCERNING
COMMENTS: NORMAL

## 2025-03-27 ASSESSMENT — CUP TO DISC RATIO
OD_RATIO: 0.3
OS_RATIO: 0.4

## 2025-03-27 ASSESSMENT — VISUAL ACUITY
OS_CC+: -3
METHOD: SNELLEN - LINEAR
OS_CC: 20/25
OD_CC: 20/20
CORRECTION_TYPE: GLASSES

## 2025-03-27 ASSESSMENT — PAIN SCALES - GENERAL: PAINLEVEL_OUTOF10: MODERATE PAIN (5)

## 2025-03-27 ASSESSMENT — EXTERNAL EXAM - RIGHT EYE: OD_EXAM: NORMAL

## 2025-03-27 ASSESSMENT — EXTERNAL EXAM - LEFT EYE: OS_EXAM: NORMAL

## 2025-03-27 NOTE — TELEPHONE ENCOUNTER
PA Initiation    Medication: LENVIMA (12 MG DAILY DOSE) 3 X 4 MG PO CPPK  Insurance Company: [employer] - Phone [phone] Fax [phone]  Pharmacy Filling the Rx:    Filling Pharmacy Phone:    Filling Pharmacy Fax:    Start Date: 3/27/2025        Lor Braun [facility]PhT[facility]cology Pharmacy [facility] [address] [address]  Office: [phone]  Fax: [phone]  [email or web address]

## 2025-03-27 NOTE — TELEPHONE ENCOUNTER
Prior Authorization Approval    Medication: LENVIMA (12 MG DAILY DOSE) 3 X 4 MG PO CPPK  Authorization Effective Date: 3/27/2025  Authorization Expiration Date: 12/31/2099  Approved Dose/Quantity:   Reference #: [id number]   Insurance Company: [employer] - Phone [phone] Fax [phone]  Expected CoPay: $ 0  CoPay Card Available: No    Financial Assistance Needed:   Which Pharmacy is filling the prescription: [facility] - [address]  Pharmacy Notified:   Patient Notified:         Lor Braun [facility]cology [facility] [address] [address]  Office: [phone]  Fax: [phone]  [email or web address]

## 2025-03-27 NOTE — NURSING NOTE
Chief Complaints and History of Present Illnesses   Patient presents with    Follow Up     T2DM   Diabetic macular edema both eyes      Chief Complaint(s) and History of Present Illness(es)       Follow Up              Comments: T2DM   Diabetic macular edema both eyes               Comments    S/p yag right eye 3/12/25  Her for yag Left eye today   No flashes or floater   States better vision since laser right eye   No eye pain or redness    Laurel Cristina COT 12:16 PM March 27, 2025

## 2025-03-27 NOTE — NURSING NOTE
Patient reviewed medications and allergies in bookletmobilehart during e-check in and said everything looked correct.      Current patient location: [address] [facility] [address]    Is the patient currently in the state of [place]? YES    Visit mode: VIDEO    If the visit is dropped, the patient can be reconnected by:VIDEO VISIT: Text to cell phone:   Telephone Information:   Mobile [phone]    and VIDEO VISIT: Send to e-mail at: [email or web address]    Will anyone else be joining the visit? NO  (If patient encounters technical issues they should call [phone]    Are changes needed to the allergy or medication list? Pt declined med review and Pt stated no med changes    Are refills needed on medications prescribed by this physician? NO    Rooming Documentation:  Questionnaire(s) completed    Reason for visit: RECHECK (Review lab results/Scan results/Next steps)      Wendy SILVAF

## 2025-03-27 NOTE — PROGRESS NOTES
CC:  status post yag laser right eye 3.12.25  Follow up Diabetic retinopathy     Interval: Pt states no change in VA since last visit.  No flashes or floaters No eye pain or redness LBS: 201 Last A1C: 5.6 Lab Results Component Value Date A1C 5.6 12/04/2024.   Still on cancer treatment 2x/day getting scans again tomorrow.    HPI: Frandy Workman is a 60 year old man with POH of PDR OS and severe NPDR OD who presents for follow up. PMH includescirrhosis + HCC s/p transplantation 11/11/2019 with posttransplant course complicated by HCC recurrence and calcineurin induced renal toxicity. Past medical history includes CAD status post CABG 7/2021, type II diabetes, HTN, and CKD.      Retinal Imaging:  Optical Coherence Tomography 03/12/25    RE: hyaloid on, no IRF, normal contour/laminations  LE:  stage 1 ERM, stable trace noncentral IRF    fluorescein angiography 09/13/23  both eyes normal AV and choroidal filling ou. Staining laser scars. No obvious NVE, mild late macula leakage. peripheral leakage and capillary non perfusion.     Optos FA 03/12/25   OD: mild burden of MA; some late small vessel leakage parafoveal and more diffusely; no NV  OS: mild burden of MA; some late small vessel leakage parafoveal and more diffusely; no NV    ASSESSMENT:     #T2DM   - HbA1c 5.6% (12/2024)  - Blood pressure (<120/80) and blood glucose (HbA1c <7.0, ~6.5 today) control discussed with patient. Patient advised that failure to adequately control each may lead to vision loss. The patient expressed understanding.    # Proliferative diabetic retinopathy left eye  # pre-proliferative diabetic retinopathy right eye    # vitreous hemorrhage left eye 10/12/22   Status post Eylea inj left eye   Status post Panretinal laser photocoagulation (PRP) 9.28.22 left eye and Status post scatter PRP right eye 11/02/22     # Diabetic macular edema both eyes   - status post avastin in both eyes; Switch to Eylea 4/24/19 with improvement of Diabetic macular  edema     - Tried holding off on injection 3/29/23, but edema worsened both eyes  - last Eylea right eye; 8/02/23 interval improvement 09/13/23 (9 weeks prior)  -  discussed with to T&E vs closed observation on 9/13/23, He preferred to observe   - Given mild worsening 10/04/23 changed to Vabysmo right eye (10/04/23 #1; 1.10.23 #3)  - 03/12/25 VA 20/25 OU; only tr noncentral DME OU; FA without NV; monitor without injections  - Last injections not since 3/2024; continue to monitor and treat PRN OU    # CEIOL OU  Right eye: 01/24/23; left eye 01/3/23  With posterior capsular opacity (PCO) right eye worse than left eye  Status post YAG OD 03/12/25   Retina attached   Consider yag left eye in the future if patient becomes symptomatic    # Dry eye syndrome   Left eye worse than right eye   warm compresses and artificial tears  As needed  - punctal plugs previously left eye - reports this is better     # Status post liver transplant  - tx 11/2019  - severe anemia; s/p transfusion - anemia much improved   - being seen by his primary team    PLAN: Treat PRN both eyes  follow up in 3 months with Optical Coherence Tomography; no dilation  Last dilation and fluorescein angiography was on 03/12/25   Repeat fluorescein angiography around sept/ October 2025  Consider yag left eye in the future if patient becomes symptomatic    ~~~~~~~~~~~~~~~~~~~~~~~~~~~~~~~~~~   Complete documentation of historical and exam elements from today's encounter can be found in the full encounter summary report (not reduplicated in this progress note).  I personally obtained the chief complaint(s) and history of present illness.  I confirmed and edited as necessary the review of systems, past medical/surgical history, family history, social history, and examination findings as documented by others; and I examined the patient myself.  I personally reviewed the relevant tests, images, and reports as documented above.  I personally reviewed the ophthalmic  test(s) associated with this encounter, agree with the interpretation(s) as documented by the resident/fellow, and have edited the corresponding report(s) as necessary.   I formulated and edited as necessary the assessment and plan and discussed the findings and management plan with the patient and family and No resident or fellow assisted with the procedures performed.  I performed the procedures myself.    Enriqueta Martin MD   of Ophthalmology.  Retina Service   Department of Ophthalmology and Visual Neurosciences   [facility]  Phone: [phone]   Fax: [phone]

## 2025-03-27 NOTE — LETTER
3/27/2025      Frandy Workman  [address] [facility] [address]      Dear Colleague,      Thank you for referring your patient, Frandy Workman, to the [facility]. Please see a copy of my visit note below.    Miller Workman returns today in follow-up of metastatic hepatocellular carcinoma in the setting of a prior liver transplant.    He is now 60 years old and presented more than 5 years ago with 2 lesions in his cirrhotic liver treated with TACE followed by liver transplant in 2019.  He developed peritoneal metastases in 2023 and was started on therapy with sorafenib.  He did not tolerate initial full doses due to severe diarrhea and nausea but has subsequently tolerated 200 mg twice per day with good control of his disease.  He had stable disease at our last visit and returns today for our next planned response assessment.    He reports that overall his condition has been about the same until he saw the results for today's visit and so in the last few days he has not been sleeping or eating much due to anxiety.  He does not believe he is lost any weight.  His balance of been relatively stable recently with only modest amount of diarrhea with his current antidiarrheal therapy.    On exam today he is animated and anxious and in no apparent distress besides his anxiety.    I personally reviewed his CT scan and over the results with him.  He does not have any clearly new sites of disease.  His 1 clearly measurable peritoneal nodule his definitely increased in size.    His alpha-fetoprotein has gone from 17 now up to 38.  His chronic renal failure is stable with a creatinine of 1.7 with relatively normal electrolytes except for mildly depressed magnesium.  His bilirubin, albumin and liver enzymes are normal.  His chronic anemia is relatively stable with hemoglobin 9.5 and MCV is 98 and his blood counts are otherwise normal.    Assessment/plan: Metastatic hepatocellular carcinoma in the  setting of a prior liver transplant now with disease progression on reduced dose sorafenib.  Had a long talk today with the patient about the fact of his progression and how to proceed.  Will increasing his saracatinib back up to full doses might help us regain control I am fairly sure he would not be able to tolerate that.  I told him I thought a better choice was to switch to a different tyrosine kinase inhibitor, with the 2 choices being lenvatinib or cabozantinib.  The choice is a little bit arbitrary but I recommended lenvatinib which will have somewhat greater problems with hypertension but much less problems with diarrhea than with sorafenib and I think he will likely be able to tolerate that.  After having his questions answered he expressed a good understanding and desire to proceed with the recommended therapy.  He will stop the sorafenib as of today and will begin the lenvatinib as soon as we have insurance approval which should be within the next week.  Will plan on seeing him in about 3 weeks to assess and manage his toxicity (and he will let us know before then if he has significant problems.) I will plan on seeing him back again in about 2 months for his next response assessment.        Again, thank you for allowing me to participate in the care of your patient.      Sincerely,    Miguel Villarreal MD    Electronically signed

## 2025-03-27 NOTE — PROGRESS NOTES
Virtual Visit Details    Type of service:  Video Visit   Video Start Time:  930  Video End Time: 1000  Originating Location (pt. Location): Home  Distant Location (provider location):  Off-site  Platform used for Video Visit: Odette Workman returns today in follow-up of metastatic hepatocellular carcinoma in the setting of a prior liver transplant.    He is now 60 years old and presented more than 5 years ago with 2 lesions in his cirrhotic liver treated with TACE followed by liver transplant in 2019.  He developed peritoneal metastases in 2023 and was started on therapy with sorafenib.  He did not tolerate initial full doses due to severe diarrhea and nausea but has subsequently tolerated 200 mg twice per day with good control of his disease.  He had stable disease at our last visit and returns today for our next planned response assessment.    He reports that overall his condition has been about the same until he saw the results for today's visit and so in the last few days he has not been sleeping or eating much due to anxiety.  He does not believe he is lost any weight.  His balance of been relatively stable recently with only modest amount of diarrhea with his current antidiarrheal therapy.    On exam today he is animated and anxious and in no apparent distress besides his anxiety.    I personally reviewed his CT scan and over the results with him.  He does not have any clearly new sites of disease.  His 1 clearly measurable peritoneal nodule his definitely increased in size.    His alpha-fetoprotein has gone from 17 now up to 38.  His chronic renal failure is stable with a creatinine of 1.7 with relatively normal electrolytes except for mildly depressed magnesium.  His bilirubin, albumin and liver enzymes are normal.  His chronic anemia is relatively stable with hemoglobin 9.5 and MCV is 98 and his blood counts are otherwise normal.    Assessment/plan: Metastatic hepatocellular carcinoma in the setting  of a prior liver transplant now with disease progression on reduced dose sorafenib.  Had a long talk today with the patient about the fact of his progression and how to proceed.  Will increasing his saracatinib back up to full doses might help us regain control I am fairly sure he would not be able to tolerate that.  I told him I thought a better choice was to switch to a different tyrosine kinase inhibitor, with the 2 choices being lenvatinib or cabozantinib.  The choice is a little bit arbitrary but I recommended lenvatinib which will have somewhat greater problems with hypertension but much less problems with diarrhea than with sorafenib and I think he will likely be able to tolerate that.  After having his questions answered he expressed a good understanding and desire to proceed with the recommended therapy.  He will stop the sorafenib as of today and will begin the lenvatinib as soon as we have insurance approval which should be within the next week.  Will plan on seeing him in about 3 weeks to assess and manage his toxicity (and he will let us know before then if he has significant problems.) I will plan on seeing him back again in about 2 months for his next response assessment.

## 2025-03-27 NOTE — LETTER
3/27/2025         RE: Frandy Workman  [address] [facility] [address]        Miller Workman returns today in follow-up of metastatic hepatocellular carcinoma in the setting of a prior liver transplant.    He is now 60 years old and presented more than 5 years ago with 2 lesions in his cirrhotic liver treated with TACE followed by liver transplant in 2019.  He developed peritoneal metastases in 2023 and was started on therapy with sorafenib.  He did not tolerate initial full doses due to severe diarrhea and nausea but has subsequently tolerated 200 mg twice per day with good control of his disease.  He had stable disease at our last visit and returns today for our next planned response assessment.    He reports that overall his condition has been about the same until he saw the results for today's visit and so in the last few days he has not been sleeping or eating much due to anxiety.  He does not believe he is lost any weight.  His balance of been relatively stable recently with only modest amount of diarrhea with his current antidiarrheal therapy.    On exam today he is animated and anxious and in no apparent distress besides his anxiety.    I personally reviewed his CT scan and over the results with him.  He does not have any clearly new sites of disease.  His 1 clearly measurable peritoneal nodule his definitely increased in size.    His alpha-fetoprotein has gone from 17 now up to 38.  His chronic renal failure is stable with a creatinine of 1.7 with relatively normal electrolytes except for mildly depressed magnesium.  His bilirubin, albumin and liver enzymes are normal.  His chronic anemia is relatively stable with hemoglobin 9.5 and MCV is 98 and his blood counts are otherwise normal.    Assessment/plan: Metastatic hepatocellular carcinoma in the setting of a prior liver transplant now with disease progression on reduced dose sorafenib.  Had a long talk today with the patient about the fact of his  progression and how to proceed.  Will increasing his saracatinib back up to full doses might help us regain control I am fairly sure he would not be able to tolerate that.  I told him I thought a better choice was to switch to a different tyrosine kinase inhibitor, with the 2 choices being lenvatinib or cabozantinib.  The choice is a little bit arbitrary but I recommended lenvatinib which will have somewhat greater problems with hypertension but much less problems with diarrhea than with sorafenib and I think he will likely be able to tolerate that.  After having his questions answered he expressed a good understanding and desire to proceed with the recommended therapy.  He will stop the sorafenib as of today and will begin the lenvatinib as soon as we have insurance approval which should be within the next week.  Will plan on seeing him in about 3 weeks to assess and manage his toxicity (and he will let us know before then if he has significant problems.) I will plan on seeing him back again in about 2 months for his next response assessment.        Miguel Villarreal MD

## 2025-03-31 ENCOUNTER — TELEPHONE (OUTPATIENT)
Dept: ONCOLOGY | Facility: CLINIC | Age: 60
End: 2025-03-31
Payer: MEDICARE

## 2025-03-31 DIAGNOSIS — C78.6 MALIGNANT NEOPLASM METASTATIC TO PERITONEUM (H): ICD-10-CM

## 2025-03-31 DIAGNOSIS — C22.0 HCC (HEPATOCELLULAR CARCINOMA) (H): Primary | ICD-10-CM

## 2025-03-31 RX ORDER — PROCHLORPERAZINE MALEATE 10 MG
10 TABLET ORAL EVERY 6 HOURS PRN
Qty: 30 TABLET | Refills: 2 | Status: SHIPPED | OUTPATIENT
Start: 2025-04-02

## 2025-03-31 NOTE — ORAL ONC MGMT
Oral Chemotherapy Monitoring Program    Lab Monitoring Plan  EKG baseline once at 2-4 weeks, and 8-12 weeks  Labs CMP and Mag every 2 weeks x 2 months then monthly, CBC monthly, TSH/T4 monthly, Protein random urine baseline and monthly  BP: 1 week, every 2 weeks x 2 months then monthly for 6 months  Subjective/Objective:  Frandy Workman is a 60 year old male contacted by phone for an initial visit for oral chemotherapy education.   Miller returned my call for Lenvima education. Side effects and lab monitoring reviewed. Miller reports not doing too well since last week's visit. Stopped sorafenib on Thursday, diarrhea continues and hasn't been able to eat much. Did have episode of vomiting over the weekend which is new, has some ondansetron on hand but did not take any. Reviewed instructions for ondansetron and will send rx for prochlorperazine with Lenvima prescription. Miller is understandable frustrated with not feeling well, diarrhea remains unchanged since stopping sorafenib, felt like he hasn't been able to eat much over the last week or so, he does not think he's been around anyone known to be sick recently and states he has been able to get enough fluids in. Asked him to call the clinic line if he starts to feel worse and feels he should be seen, otherwise we will touch base with how he is doing after EKG and lab results are back.        12/4/2024    11:00 AM 12/30/2024     9:00 AM 1/13/2025    10:00 AM 2/3/2025     2:00 PM 3/3/2025     8:00 AM 3/27/2025     2:30 PM 3/28/2025     2:00 PM   ORAL CHEMOTHERAPY   Assessment Type Refill Left Voicemail;Refill Left Voicemail Refill Refill Discontinuation Initial Work up;Left Voicemail   Stop Date      3/27/2025    Reason for Discontinuation      Disease progression    Diagnosis Code Hepatocellular Cancer Hepatocellular Cancer Hepatocellular Cancer Hepatocellular Cancer Hepatocellular Cancer Hepatocellular Cancer Hepatocellular Cancer   Providers Dr. Cherelle Villarreal Dr.  "Cherelle Villarreal   [facility]   Is this patient followed by the [facility] OC team? No No No No No  No   Drug Name Nexavar (sorafenib) Nexavar (sorafenib) Nexavar (sorafenib) Nexavar (sorafenib) Nexavar (sorafenib) Nexavar (sorafenib) Lenvima (lenvatinib)   Dose 200 mg 200 mg 200 mg 200 mg 200 mg  12 mg   Current Schedule BID BID BID BID BID  Daily   Cycle Details Continuous Continuous Continuous Continuous Continuous  Continuous   Is the dose as ordered appropriate for the patient?     Yes         Last PHQ-2 Score on record:       3/27/2025    12:17 PM 3/12/2025     1:49 PM   PHQ-2 ( 1999 [employer])   Q1: Little interest or pleasure in doing things 0 0   Q2: Feeling down, depressed or hopeless 0 0   PHQ-2 Score 0 0   PHQ-2 Total Score (12-17 Years)- Positive if 3 or more points; Administer PHQ-A if positive 0 0       Vitals:  BP:   BP Readings from Last 1 Encounters:   03/11/25 115/74     Wt Readings from Last 1 Encounters:   03/27/25 69.9 kg (154 lb)     Estimated body surface area is 1.84 meters squared as calculated from the following:    Height as of 3/27/25: 1.753 m (5' 9\").    Weight as of 3/27/25: 69.9 kg (154 lb).    Labs:  _  Result Component Current Result Ref Range   Sodium 134 (L) (3/13/2025) 135 - 145 mmol/L     _  Result Component Current Result Ref Range   Potassium 4.6 (3/13/2025) 3.4 - 5.3 mmol/L     _  Result Component Current Result Ref Range   Calcium 7.7 (L) (3/13/2025) 8.8 - 10.4 mg/dL     _  Result Component Current Result Ref Range   Magnesium 1.3 (L) (3/13/2025) 1.7 - 2.3 mg/dL     _  Result Component Current Result Ref Range   Phosphorus 3.4 (3/13/2025) 2.5 - 4.5 mg/dL     _  Result Component Current Result Ref Range   Albumin 4.3 (3/13/2025) 3.5 - 5.2 g/dL     _  Result Component Current Result Ref Range   Urea Nitrogen 43.3 (H) (3/13/2025) 8.0 - 23.0 mg/dL     _  Result Component Current Result Ref " Range   Creatinine 1.71 (H) (3/13/2025) 0.67 - 1.17 mg/dL     _  Result Component Current Result Ref Range   AST 24 (3/13/2025) 0 - 45 U/L     _  Result Component Current Result Ref Range   ALT 25 (3/13/2025) 0 - 70 U/L     _  Result Component Current Result Ref Range   Bilirubin Total 0.3 (3/13/2025) <=1.2 mg/dL     _  Result Component Current Result Ref Range   WBC Count 6.0 (3/13/2025) 4.0 - 11.0 10e3/uL     _  Result Component Current Result Ref Range   Hemoglobin 9.5 (L) (3/13/2025) 13.3 - 17.7 g/dL     _  Result Component Current Result Ref Range   Platelet Count 186 (3/13/2025) 150 - 450 10e3/uL     No results found for ANC within last 30 days.     _  Result Component Current Result Ref Range   Absolute Neutrophils 5.0 (3/13/2025) 1.6 - 8.3 10e3/uL        Assessment:  Patient is appropriate to start therapy pending baseline labs/EKG. Will ask Dr Villarreal if he wants Miller to start ASAP versus waiting until he is feeling a bit better. Last labs were 3/13 so will repeat with baseline EKG as well.    Plan:  Basic chemotherapy teaching was reviewed with the patient including indication, start date of therapy, dose, administration, adverse effects, missed doses, food and drug interactions, monitoring, side effect management, office contact information, and safe handling. Written materials were provided and all questions answered.    Follow-Up:  Arrange assessment call and follow up labs once start date confirmed.     Courtney Panda, PharmD, BCPS  Oral Chemotherapy Monitoring Program  [facility]  [phone]

## 2025-04-01 ENCOUNTER — LAB (OUTPATIENT)
Dept: LAB | Facility: CLINIC | Age: 60
End: 2025-04-01
Payer: MEDICARE

## 2025-04-01 DIAGNOSIS — E11.22 TYPE 2 DIABETES MELLITUS WITH STAGE 3A CHRONIC KIDNEY DISEASE, WITH LONG-TERM CURRENT USE OF INSULIN (H): ICD-10-CM

## 2025-04-01 DIAGNOSIS — Z79.899 ENCOUNTER FOR LONG-TERM (CURRENT) USE OF MEDICATIONS: ICD-10-CM

## 2025-04-01 DIAGNOSIS — E11.49 TYPE II OR UNSPECIFIED TYPE DIABETES MELLITUS WITH NEUROLOGICAL MANIFESTATIONS, NOT STATED AS UNCONTROLLED(250.60) (H): ICD-10-CM

## 2025-04-01 DIAGNOSIS — E11.69 TYPE 2 DIABETES MELLITUS WITH DIABETIC FOOT DEFORMITY (H): ICD-10-CM

## 2025-04-01 DIAGNOSIS — M21.969 TYPE 2 DIABETES MELLITUS WITH DIABETIC FOOT DEFORMITY (H): ICD-10-CM

## 2025-04-01 DIAGNOSIS — L60.2 ONYCHAUXIS: ICD-10-CM

## 2025-04-01 DIAGNOSIS — C78.6 MALIGNANT NEOPLASM METASTATIC TO PERITONEUM (H): ICD-10-CM

## 2025-04-01 DIAGNOSIS — Z79.4 TYPE 2 DIABETES MELLITUS WITH STAGE 3A CHRONIC KIDNEY DISEASE, WITH LONG-TERM CURRENT USE OF INSULIN (H): ICD-10-CM

## 2025-04-01 DIAGNOSIS — N18.31 TYPE 2 DIABETES MELLITUS WITH STAGE 3A CHRONIC KIDNEY DISEASE, WITH LONG-TERM CURRENT USE OF INSULIN (H): ICD-10-CM

## 2025-04-01 LAB
ALBUMIN MFR UR ELPH: 51 MG/DL
ALBUMIN SERPL BCG-MCNC: 4.1 G/DL (ref 3.5–5.2)
ALP SERPL-CCNC: 84 U/L (ref 40–150)
ALT SERPL W P-5'-P-CCNC: 20 U/L (ref 0–70)
ANION GAP SERPL CALCULATED.3IONS-SCNC: 14 MMOL/L (ref 7–15)
AST SERPL W P-5'-P-CCNC: 23 U/L (ref 0–45)
BASOPHILS # BLD AUTO: 0 10E3/UL (ref 0–0.2)
BASOPHILS NFR BLD AUTO: 1 %
BILIRUB SERPL-MCNC: 0.3 MG/DL
BUN SERPL-MCNC: 32.5 MG/DL (ref 8–23)
CALCIUM SERPL-MCNC: 7 MG/DL (ref 8.8–10.4)
CHLORIDE SERPL-SCNC: 101 MMOL/L (ref 98–107)
CREAT SERPL-MCNC: 2.14 MG/DL (ref 0.67–1.17)
CREAT UR-MCNC: 48 MG/DL
EGFRCR SERPLBLD CKD-EPI 2021: 34 ML/MIN/1.73M2
EOSINOPHIL # BLD AUTO: 0.1 10E3/UL (ref 0–0.7)
EOSINOPHIL NFR BLD AUTO: 2 %
ERYTHROCYTE [DISTWIDTH] IN BLOOD BY AUTOMATED COUNT: 14.2 % (ref 10–15)
GLUCOSE SERPL-MCNC: 136 MG/DL (ref 70–99)
HCO3 SERPL-SCNC: 23 MMOL/L (ref 22–29)
HCT VFR BLD AUTO: 29.3 % (ref 40–53)
HGB BLD-MCNC: 9.3 G/DL (ref 13.3–17.7)
IMM GRANULOCYTES # BLD: 0 10E3/UL
IMM GRANULOCYTES NFR BLD: 1 %
LYMPHOCYTES # BLD AUTO: 0.9 10E3/UL (ref 0.8–5.3)
LYMPHOCYTES NFR BLD AUTO: 15 %
MAGNESIUM SERPL-MCNC: 1.5 MG/DL (ref 1.7–2.3)
MCH RBC QN AUTO: 30.7 PG (ref 26.5–33)
MCHC RBC AUTO-ENTMCNC: 31.7 G/DL (ref 31.5–36.5)
MCV RBC AUTO: 97 FL (ref 78–100)
MONOCYTES # BLD AUTO: 0.3 10E3/UL (ref 0–1.3)
MONOCYTES NFR BLD AUTO: 6 %
NEUTROPHILS # BLD AUTO: 4.3 10E3/UL (ref 1.6–8.3)
NEUTROPHILS NFR BLD AUTO: 76 %
NRBC # BLD AUTO: 0 10E3/UL
NRBC BLD AUTO-RTO: 0 /100
PLATELET # BLD AUTO: 156 10E3/UL (ref 150–450)
POTASSIUM SERPL-SCNC: 4.5 MMOL/L (ref 3.4–5.3)
PROT SERPL-MCNC: 6.9 G/DL (ref 6.4–8.3)
PROT/CREAT 24H UR: 1.06 MG/MG CR (ref 0–0.2)
RBC # BLD AUTO: 3.03 10E6/UL (ref 4.4–5.9)
SODIUM SERPL-SCNC: 138 MMOL/L (ref 135–145)
TSH SERPL DL<=0.005 MIU/L-ACNC: 2.05 UIU/ML (ref 0.3–4.2)
WBC # BLD AUTO: 5.6 10E3/UL (ref 4–11)

## 2025-04-01 RX ORDER — AMMONIUM LACTATE 12 G/100G
CREAM TOPICAL DAILY PRN
Qty: 140 G | Refills: 5 | Status: SHIPPED | OUTPATIENT
Start: 2025-04-01

## 2025-04-02 ENCOUNTER — TELEPHONE (OUTPATIENT)
Dept: ONCOLOGY | Facility: CLINIC | Age: 60
End: 2025-04-02
Payer: MEDICARE

## 2025-04-02 LAB
ATRIAL RATE - MUSE: 71 BPM
DIASTOLIC BLOOD PRESSURE - MUSE: NORMAL MMHG
INTERPRETATION ECG - MUSE: NORMAL
P AXIS - MUSE: 15 DEGREES
PR INTERVAL - MUSE: 146 MS
QRS DURATION - MUSE: 72 MS
QT - MUSE: 420 MS
QTC - MUSE: 456 MS
R AXIS - MUSE: 31 DEGREES
SYSTOLIC BLOOD PRESSURE - MUSE: NORMAL MMHG
T AXIS - MUSE: 29 DEGREES
VENTRICULAR RATE- MUSE: 71 BPM

## 2025-04-02 NOTE — PROGRESS NOTES
HISTORY OF PRESENT ILLNESS: Frandy Workman is a 52 year old male with a history of a swelling over his right zygomatic arch for the last 6 weeks. He had some minimal dental work at the time and is swallowing occurred after the period he try sialagogues with some mild help but it affected his glucose had to stop. He does take medication for his liver which tends to cause dental damage and he does have mild xerostomia. He's had no prior difficulties with parotid gland swelling in the past. It is painful to touch. He has no limitations of jaw opening. He has had some decrease in his hearing but some wax came out his hearing restored back to baseline.      PAST MEDICAL HISTORY:   Past Medical History:   Diagnosis Date     BPH (benign prostatic hyperplasia)      Cholelithiasis      Hyperlipidemia      Liver cirrhosis secondary to ESTRADA (H)      Type II diabetes mellitus (H)        PAST SURGICAL HISTORY:   Past Surgical History:   Procedure Laterality Date     ESOPHAGOSCOPY, GASTROSCOPY, DUODENOSCOPY (EGD), COMBINED N/A 11/17/2016    Procedure: COMBINED ESOPHAGOSCOPY, GASTROSCOPY, DUODENOSCOPY (EGD);  Surgeon: Santi Rosas MD;  Location:  GI     KNEE SURGERY Left        FAMILY HISTORY:   Family History   Problem Relation Age of Onset     Prostate Cancer Maternal Grandfather      Colon Cancer Father 60     Pancreatic Cancer Father 60     Prostate Cancer Father      Colorectal Cancer Father      Macular Degeneration Father      CANCER Father      Colorectal Cancer Maternal Grandmother      Colorectal Cancer Paternal Grandmother      CANCER Mother      Liver Disease No family hx of        SOCIAL HISTORY:   Social History   Substance Use Topics     Smoking status: Light Tobacco Smoker     Types: Dip, chew, snus or snuff     Smokeless tobacco: Current User      Comment: 1 tin per week     Alcohol use No      Comment: quit Sept. 1996       REVIEW OF SYSTEMS: Ten point review of systems was performed and is  negative except for what is noted in the HPI and PMH.     ALLERGIES: Codeine    MEDICATIONS:   Current Outpatient Prescriptions   Medication Sig Dispense Refill     cephALEXin (KEFLEX) 500 MG capsule Take 1 capsule (500 mg) by mouth 3 times daily 30 capsule 0     lactulose (CHRONULAC) 10 GM/15ML solution Take 45 mLs (30 g) by mouth 4 times daily 7200 mL 11     dapagliflozin (FARXIGA) 10 MG TABS tablet Take 1 tablet (10 mg) by mouth daily 90 tablet 3     OLANZapine (ZYPREXA) 2.5 MG tablet Take 1 tablet (2.5 mg) by mouth At Bedtime 30 tablet 5     potassium chloride SA (K-DUR/KLOR-CON M) 20 MEQ CR tablet Take 1 tablet (20 mEq) by mouth daily 90 tablet 3     Cholecalciferol (VITAMIN D-3 PO) Take 2,000 Units by mouth       insulin degludec (TRESIBA) 200 UNIT/ML pen Take 120 units daily. 21 mL 11     tamsulosin (FLOMAX) 0.4 MG capsule Take 1 capsule (0.4 mg) by mouth daily 90 capsule 1     omeprazole (PRILOSEC) 40 MG capsule Take 1 capsule (40 mg) by mouth daily 90 capsule 1     spironolactone (ALDACTONE) 25 MG tablet Take 1 tablet (25 mg) by mouth daily 90 tablet 3     rifaximin (XIFAXAN) 550 MG TABS tablet Take 1 tablet (550 mg) by mouth 2 times daily 60 tablet 11     insulin aspart (NOVOLOG FLEXPEN) 100 UNIT/ML injection 1 unit per 4 Gms CHO at meals and snacks + correction scale of 1 unit per 25 mg/dL over 125. Average daily dose is 75 units. 24 mL 11     blood glucose monitoring (ACCU-CHEK EDINSON PLUS) test strip Use to test blood sugar 4 times daily 400 each 3     blood glucose monitoring (ACCU-CHEK FASTCLIX) lancets Use to test blood sugar 4 times daily or as directed.  1 box = 102 lancets 3 Box 3     carvedilol (COREG) 12.5 MG tablet Take 1 tablet (12.5 mg) by mouth 2 times daily (with meals) 180 tablet 3     simvastatin (ZOCOR) 20 MG tablet Take 1 tablet (20 mg) by mouth At Bedtime 90 tablet 3     aspirin 81 MG tablet Take 1 tablet (81 mg) by mouth daily 90 tablet 3     cyanocobalamin (RA VITAMIN B-12 TR) 1000  MCG TBCR Take 1,000 mcg by mouth daily       insulin pen needle (BD VIKTORIA U/F) 32G X 4 MM U 6 D       Multiple Vitamin (THERAVITE PO) Take 1 tablet by mouth daily         PHYSICAL EXAMINATION:  He  is awake, alert and in no apparent distress.    His tympanic membranes are clear and intact bilaterally. External auditory canals are clear.  Nasal exam shows a mild septal deviation without obstruction.  Examination of the oral cavity shows no suspicious lesions.  There is symmetric movement of the tongue and soft palate.    The oropharynx is clear.  His neck is supple without significant adenopathy. There is an approximately 3 cm fullness over his right zygomatic arch. It is smooth. The overlying skin is not inflamed or erythematous. It is firm to palpation but not rock hard. And is limited and no mobility. Pulse is regular.  Upper airway is clear.  Cranial nerves II-XII are grossly intact.             IMPRESSION: Possible sialadenitis given the sudden onset but it is relatively high for the parotid gland and the remainder of the parotid gland is not affected with good salivary flow through the salivary duct with massage and intraoral exam. I'm going to obtain a CT with contrast to evaluate this further. Pending the results,  I will treat him first for sialadenitis with a course of Keflex, sialagogues, local heat and local massage. I will see him back in 2 weeks to violate results of therapy if the CT scan result is consistent with a sialadenitis picture. If this fails to resolve and the CT scan is consistent with parotid disease II may refer him for sialoendoscopy.  If the CT scan shows something else, will likely refer for further evaluation.       This was a shared visit with the [facility]. I reviewed and verified the documentation.

## 2025-04-02 NOTE — ORAL ONC MGMT
Oral Chemotherapy Monitoring Program  Lab Follow Up    Reviewed lab results from 4/1/25.        12/4/2024    11:00 AM 12/30/2024     9:00 AM 1/13/2025    10:00 AM 2/3/2025     2:00 PM 3/3/2025     8:00 AM 3/27/2025     2:30 PM 3/28/2025     2:00 PM   ORAL CHEMOTHERAPY   Assessment Type Refill Left Voicemail;Refill Left Voicemail Refill Refill Discontinuation Initial Work up;Left Voicemail   Stop Date      3/27/2025    Reason for Discontinuation      Disease progression    Diagnosis Code Hepatocellular Cancer Hepatocellular Cancer Hepatocellular Cancer Hepatocellular Cancer Hepatocellular Cancer Hepatocellular Cancer Hepatocellular Cancer   Providers Dr. Cherelle Villarreal   [facility]   Is this patient followed by the [facility] team? No No No No No  No   Drug Name Nexavar (sorafenib) Nexavar (sorafenib) Nexavar (sorafenib) Nexavar (sorafenib) Nexavar (sorafenib) Nexavar (sorafenib) Lenvima (lenvatinib)   Dose 200 mg 200 mg 200 mg 200 mg 200 mg  12 mg   Current Schedule BID BID BID BID BID  Daily   Cycle Details Continuous Continuous Continuous Continuous Continuous  Continuous   Is the dose as ordered appropriate for the patient?     Yes         Labs:  _  Result Component Current Result Ref Range   Sodium 138 (4/1/2025) 135 - 145 mmol/L     _  Result Component Current Result Ref Range   Potassium 4.5 (4/1/2025) 3.4 - 5.3 mmol/L     _  Result Component Current Result Ref Range   Calcium 7.0 (L) (4/1/2025) 8.8 - 10.4 mg/dL     _  Result Component Current Result Ref Range   Magnesium 1.5 (L) (4/1/2025) 1.7 - 2.3 mg/dL     _  Result Component Current Result Ref Range   Phosphorus 3.4 (3/13/2025) 2.5 - 4.5 mg/dL     _  Result Component Current Result Ref Range   Albumin 4.1 (4/1/2025) 3.5 - 5.2 g/dL     _  Result Component Current Result Ref Range   Urea Nitrogen 32.5 (H) (4/1/2025) 8.0 - 23.0 mg/dL      _  Result Component Current Result Ref Range   Creatinine 2.14 (H) (4/1/2025) 0.67 - 1.17 mg/dL     _  Result Component Current Result Ref Range   AST 23 (4/1/2025) 0 - 45 U/L     _  Result Component Current Result Ref Range   ALT 20 (4/1/2025) 0 - 70 U/L     _  Result Component Current Result Ref Range   Bilirubin Total 0.3 (4/1/2025) <=1.2 mg/dL     _  Result Component Current Result Ref Range   WBC Count 5.6 (4/1/2025) 4.0 - 11.0 10e3/uL     _  Result Component Current Result Ref Range   Hemoglobin 9.3 (L) (4/1/2025) 13.3 - 17.7 g/dL     _  Result Component Current Result Ref Range   Platelet Count 156 (4/1/2025) 150 - 450 10e3/uL     No results found for ANC within last 30 days.     _  Result Component Current Result Ref Range   Absolute Neutrophils 4.3 (4/1/2025) 1.6 - 8.3 10e3/uL        Assessment & Plan:  Results are concerning for increased serum creatinine compared to last results, but less than 2/18/25 lab draw. Baseline Qtc =456. Inbasket to care team re: start date for Lenvima and if any changes to the plan are needed.    LVM for Miller this morning to check in and see how he is doing re: complaints of nausea, fatigue and ongoing diarrhea. Will update when response received.    Miller is starting to feel better, eating more. Still has some diarrhea in the middle of the night which is not new. Miller is ready to start Lenvima as long as Dr Villarreal is ok with that plan.    Follow-Up:  Let Miller know ok to start once Dr Villarreal confirms ok with continuing as planned.    Courtney Panda, PharmD, BCPS  Oral Chemotherapy Monitoring Program  [facility]  [phone]

## 2025-04-03 ENCOUNTER — MYC MEDICAL ADVICE (OUTPATIENT)
Dept: ONCOLOGY | Facility: CLINIC | Age: 60
End: 2025-04-03
Payer: MEDICARE

## 2025-04-14 ENCOUNTER — VIRTUAL VISIT (OUTPATIENT)
Dept: ONCOLOGY | Facility: CLINIC | Age: 60
End: 2025-04-14
Attending: INTERNAL MEDICINE
Payer: MEDICARE

## 2025-04-14 VITALS — BODY MASS INDEX: 23.99 KG/M2 | HEIGHT: 69 IN | WEIGHT: 162 LBS

## 2025-04-14 DIAGNOSIS — E83.51 HYPOCALCEMIA: ICD-10-CM

## 2025-04-14 DIAGNOSIS — C22.0 HCC (HEPATOCELLULAR CARCINOMA) (H): Primary | ICD-10-CM

## 2025-04-14 PROCEDURE — G2211 COMPLEX E/M VISIT ADD ON: HCPCS | Mod: 95 | Performed by: PHYSICIAN ASSISTANT

## 2025-04-14 PROCEDURE — 1125F AMNT PAIN NOTED PAIN PRSNT: CPT | Mod: 95 | Performed by: PHYSICIAN ASSISTANT

## 2025-04-14 PROCEDURE — 98007 SYNCH AUDIO-VIDEO EST HI 40: CPT | Mod: 24 | Performed by: PHYSICIAN ASSISTANT

## 2025-04-14 ASSESSMENT — PAIN SCALES - GENERAL: PAINLEVEL_OUTOF10: MODERATE PAIN (6)

## 2025-04-14 NOTE — NURSING NOTE
Current patient location: [address] [facility] [address]    Is the patient currently in the state of [place]? YES    Visit mode: VIDEO    If the visit is dropped, the patient can be reconnected by:VIDEO VISIT: Send to e-mail at: [email or web address]    Will anyone else be joining the visit? NO  (If patient encounters technical issues they should call [phone])    Are changes needed to the allergy or medication list? No    Are refills needed on medications prescribed by this physician? NO    Rooming Documentation:  Unable to complete questionnaire(s) due to time    Reason for visit: [facility]

## 2025-04-14 NOTE — LETTER
"4/14/2025      Frandy Workman  [address] [facility] [address]      Dear Colleague,    Thank you for referring your patient, Frandy Workman, to the [facility]. Please see a copy of my visit note below.    Virtual Visit Details    Type of service:  Video Visit   Video Start Time: {video visit start/end time for provider to select:638721}  Video End Time:{video visit start/end time for provider to select:251568}    Originating Location (pt. Location): {video visit patient location:212675::\"Home\"}  {PROVIDER LOCATION On-site should be selected for visits conducted from your clinic location or adjoining [facility] hospital, academic office, or other nearby [facility] building. Off-site should be selected for all other provider locations, including home:[id number]}  Distant Location (provider location):  {virtual location provider:675642}  Platform used for Video Visit: {Virtual Visit Platforms:917162::\"Metastorm\"}      Oncology/Hematology Visit Note  Apr 14, 2025    Reason for Visit: follow up of metastatic hepatocellular carcinoma in the setting of a prior liver transplant, toxicity follow-up     History of Present Illness: Frandy Workman is a 60 year old male with metastatic hepatocellular carcinoma in the setting of a prior liver transplant. He presented more than 5 years ago with 2 lesions in his cirrhotic liver treated with TACE followed by liver transplant in 2019. He developed peritoneal metastases in 2023 and was started on therapy with sorafenib. He did not tolerate initial full doses due to severe diarrhea and nausea but has subsequently tolerated 200 mg twice per day with good control of his disease. CT CAP on 3/13/25 showed progressive disease with increased size of a peritoneal nodule. His AFP was also rising at that time. He started on treatment with lenvatinib on 4/4/25. Due to diarrhea, treatment was held on 4/11/25. Please see previous notes for further details on the patient's " history. He comes in today for routine follow up.    Interval History:        Review of Systems:  Patient denies any of the following except if noted above: fevers, chills, difficulty with energy, vision or hearing changes, chest pain, dyspnea, abdominal pain, nausea, vomiting, diarrhea, constipation, urinary concerns, headaches, numbness, tingling, issues with sleep or mood. Patient also denies lumps, bumps, rashes or skin lesions, bleeding or bruising issues.    Current Outpatient Medications   Medication Sig Dispense Refill    acetaminophen (TYLENOL) 325 MG tablet TAKE 1 TABLET BY MOUTH EVERY SIX HOURS AS NEEDED FOR MILD PAIN 100 tablet 3    albuterol (PROAIR HFA/PROVENTIL HFA/VENTOLIN HFA) 108 (90 Base) MCG/ACT inhaler Inhale 2 puffs into the lungs every 4 hours as needed for shortness of breath, wheezing or cough. 18 g 3    ammonium lactate (AMLACTIN) 12 % external cream APPLY TOPICALLY DAILY AS NEEDED FOR DRY SKIN (ON FEET) 140 g 5    benzoyl peroxide 5 % external liquid Use topically in showers as a body wash 226 g 5    blood glucose (NO BRAND SPECIFIED) lancets standard Use to test blood sugar 1 times daily or as directed. 100 each 11    calcium carbonate (TUMS) 500 MG chewable tablet Take 1 tablet (500 mg) by mouth 2 times daily. 180 tablet 3    Continuous Glucose  (FREESTYLE CLAUDIA 3 READER) CECILIO 1 each continuously. use to read blood sugar per 's instructions 1 each 0    Continuous Glucose Sensor (FREESTYLE CLAUDIA 3 PLUS SENSOR) MISC Change every 15 days. 6 each 1    diphenoxylate-atropine (LOMOTIL) 2.5-0.025 MG tablet TAKE 2 TABLETS BY MOUTH FOUR TIMES A DAY AS NEEDED FOR DIARRHEA 60 tablet 3    ELIQUIS ANTICOAGULANT 5 MG tablet TAKE 1 TABLET BY MOUTH TWICE A DAY 60 tablet 3    furosemide (LASIX) 20 MG tablet Take 1 tablet (20 mg) by mouth as needed (for edema). 90 tablet 3    insulin degludec (TRESIBA FLEXTOUCH) 100 UNIT/ML pen INJECT 14 UNITS SUBCUTANEOUSLY ONCE DAILY 15 mL 3     insulin pen needle (BD VIKTORIA U/F) 32G X 4 MM miscellaneous USE 5 PER  each 3    JARDIANCE 10 MG TABS tablet TAKE 1 TABLET BY MOUTH DAILY 30 tablet 11    ketoconazole (NIZORAL) 2 % external shampoo APPLY A THIN LAYER TOPICALLY TO SCALP IN SHOWER, LEAVE ON 5 MIN PRIOR TO RINSE (MAY ALSO USE AS BODY WASH) 120 mL 5    lamiVUDine (EPIVIR) 100 MG tablet Take 1 tablet (100 mg) by mouth daily. 90 tablet 2    Lenvatinib 12 MG, 3 x 4 mg capsules, (LENVIMA) 3 x 4 MG capsule Take 3 capsules (12 mg) by mouth daily. 90 capsule 0    lisinopril (ZESTRIL) 10 MG tablet Take 1 tablet (10 mg) by mouth daily. 30 tablet 11    LOKELMA 10 g PACK packet DISSOLVE AND TAKE ONE PACKET BY MOUTH THREE TIMES A DAY FOR 2 DAYS, THEN DISSOLVE AND TAKE ONE PACKET BY MOUTH DAILY. 30 packet 10    loperamide (IMODIUM) 2 MG capsule Take 1 capsule (2 mg) by mouth 4 times daily as needed for diarrhea. 120 capsule 3    MAGNESIUM OXIDE 400 (240 Mg) MG tablet TAKE 1 TABLET BY MOUTH DAILY 30 tablet 5    metoprolol succinate ER (TOPROL XL) 25 MG 24 hr tablet TAKE 1 TABLET BY MOUTH DAILY 30 tablet 3    Multiple Vitamin (DAILY-GLADIS MULTIVITAMIN) TABS TAKE 1 TABLET BY MOUTH ONCE DAILY 30 tablet 11    mycophenolate (GENERIC EQUIVALENT) 250 MG capsule Take 2 capsules (500 mg) by mouth 2 times daily. 120 capsule 11    NIFEdipine ER OSMOTIC (PROCARDIA XL) 60 MG 24 hr tablet TAKE 1 TABLET BY MOUTH TWICE A DAY 60 tablet 3    omega 3 1000 MG CAPS Take 2,000 mg by mouth 2 times daily. 120 capsule 11    ondansetron (ZOFRAN ODT) 8 MG ODT tab Take 1 tablet (8 mg) by mouth every 8 hours as needed for nausea 15 tablet 3    pantoprazole (PROTONIX) 40 MG EC tablet TAKE 1 TABLET BY MOUTH ONCE DAILY BEFORE BREAKFAST 90 tablet 3    phosphorus tablet 250 mg 250 MG per tablet Take 1 tablet (250 mg) by mouth 4 times daily. 120 tablet 1    prednisoLONE acetate (PRED FORTE) 1 % ophthalmic suspension  (Patient not taking: No sig reported)      predniSONE (DELTASONE) 5 MG tablet  Take 1 tablet (5 mg) by mouth daily. 30 tablet 11    prochlorperazine (COMPAZINE) 10 MG tablet Take 1 tablet (10 mg) by mouth every 6 hours as needed for nausea or vomiting. 30 tablet 2    rosuvastatin (CRESTOR) 20 MG tablet Take 1 tablet (20 mg) by mouth daily. 30 tablet 11    tamsulosin (FLOMAX) 0.4 MG capsule TAKE 1 CAPSULE BY MOUTH ONCE DAILY 30 capsule 11    triamcinolone (KENALOG) 0.1 % external cream Apply topically 2 times daily. 80 g 0    UltiCare Alcohol Swabs 70 % PADS 1 each by Other route 4 times daily 400 each 1    VITAMIN D3 50 MCG (2000 UT) tablet TAKE 1 TABLET BY MOUTH ONCE DAILY 30 tablet 8     Objective:  General: patient appears well in no acute distress, alert and oriented, speech clear and fluid  Skin: no visualized rash or lesions on visualized skin  Resp: Appears to be breathing comfortably without accessory muscle usage, speaking in full sentences, no audible wheezes or cough.  Psych: Coherent speech, normal rate and volume, able to articulate logical thoughts, able to abstract reason, no tangential thoughts, no hallucinations or delusions  Patient's affect is appropriate.    Laboratory Data:  Most Recent 3 CBC's:  Recent Labs   Lab Test 04/01/25  1017 03/13/25  1625 02/18/25  1354   WBC 5.6 6.0 6.1   HGB 9.3* 9.5* 9.4*   MCV 97 98 98    186 177   ANEUTAUTO 4.3 5.0 5.2    Most Recent 3 BMP's:  Recent Labs   Lab Test 04/01/25  1017 03/13/25  1625 03/13/25  1543 02/21/25  0850 02/18/25  1354    134*  --  136 137   POTASSIUM 4.5 4.6  --  5.4* 4.8   CHLORIDE 101 98  --  101 101   CO2 23 22  --  23 21*   BUN 32.5* 43.3*  --  43.1* 63.0*   CR 2.14* 1.71* 2.0* 1.98* 2.35*   ANIONGAP 14 14  --  12 15   DEBORAH 7.0* 7.7*  --  7.8* 7.5*   * 86  --  136* 116*   PROTTOTAL 6.9 7.1  --   --  7.2   ALBUMIN 4.1 4.3  --   --  4.1    Most Recent 2 LFT's:  Recent Labs   Lab Test 04/01/25  1017 03/13/25  1625   AST 23 24   ALT 20 25   ALKPHOS 84 92   BILITOTAL 0.3 0.3    Most Recent TSH and  T4:  Recent Labs   Lab Test 04/01/25  1017 01/24/20  0837 08/08/18  1246   TSH 2.05   < > 0.49   T4  --   --  1.21    < > = values in this interval not displayed.   I reviewed the above labs today.    Assessment and Plan:  Metastatic hepatocellular carcinoma. Now on treatment with lenvatinib, placed on hold on 4/11/25 due to diarrhea.     Will repeat imaging late May.     CKD.    Anemia. Chronic and unchanged, consistent with anemia of chronic disease. Will continue to monitor.     Hypomagnesemia. Chronic.     Sara Kennedy PA-C  [facility]  [address] [place] [id number]  [phone]    *** minutes spent on the date of the encounter doing chart review, review of test results, interpretation of tests, patient visit, and documentation     The longitudinal plan of care for the diagnosis as documented were addressed during this visit. Due to the added complexity in care, I will continue to support Miller in the subsequent management and with ongoing continuity of care.    Virtual Visit Details    Type of service:  Video Visit   Video Start Time: 10:54 AM  Video End Time:11:05 AM    Originating Location (pt. Location): Home  Distant Location (provider location):  Off-site  Platform used for Video Visit: [facility]    Oncology/Hematology Visit Note  Apr 14, 2025    Reason for Visit: follow up of metastatic hepatocellular carcinoma in the setting of a prior liver transplant, toxicity follow-up     History of Present Illness: Frandy Workman is a 60 year old male with metastatic hepatocellular carcinoma in the setting of a prior liver transplant. He presented more than 5 years ago with 2 lesions in his cirrhotic liver treated with TACE followed by liver transplant in 2019. He developed peritoneal metastases in 2023 and was started on therapy with sorafenib. He did not tolerate initial full doses due to severe diarrhea and nausea but has subsequently tolerated 200 mg twice per day with good control of his  disease. CT CAP on 3/13/25 showed progressive disease with increased size of a peritoneal nodule. His AFP was also rising at that time. He started on treatment with lenvatinib on 4/4/25. Due to diarrhea, treatment was held on 4/11/25. Please see previous notes for further details on the patient's history. He comes in today for routine follow up.    Interval History:  -Started lenvatinib last week and had severe diarrhea. Held chemo pills over the weekend and had one episode of diarrhea over the weekend. Has not had any normal stools yet.   -Was having 6-10 watery stools/day. No relief with 2 qid Lomotil.   -Has some fatigue. Naps about 1/2 of day. Had fatigue prior to lenvatinib.   -Was walking and lifting weights, not doing it for the past 3 weeks due to fatigue and weakness.   -Denies any fevers.   -Has abdominal pain at scar sites that is constant pressure during the day and intermittent sharp pain.  -Has some nausea, managed with Compazine. Denies vomiting.   -Eating 1-1.5 meals/day and drinking 160 oz/day. Has lost 6 pounds since starting lenvatinib, down to 162 pounds.   -BP this morning was 110/80. Denies any dizziness or lightheadedness.    Current Outpatient Medications   Medication Sig Dispense Refill    acetaminophen (TYLENOL) 325 MG tablet TAKE 1 TABLET BY MOUTH EVERY SIX HOURS AS NEEDED FOR MILD PAIN 100 tablet 3    albuterol (PROAIR HFA/PROVENTIL HFA/VENTOLIN HFA) 108 (90 Base) MCG/ACT inhaler Inhale 2 puffs into the lungs every 4 hours as needed for shortness of breath, wheezing or cough. 18 g 3    ammonium lactate (AMLACTIN) 12 % external cream APPLY TOPICALLY DAILY AS NEEDED FOR DRY SKIN (ON FEET) 140 g 5    benzoyl peroxide 5 % external liquid Use topically in showers as a body wash 226 g 5    blood glucose (NO BRAND SPECIFIED) lancets standard Use to test blood sugar 1 times daily or as directed. 100 each 11    calcium carbonate (TUMS) 500 MG chewable tablet Take 1 tablet (500 mg) by mouth 2 times  daily. 180 tablet 3    Continuous Glucose  (FREESTYLE CLAUDIA 3 READER) CECILIO 1 each continuously. use to read blood sugar per 's instructions 1 each 0    Continuous Glucose Sensor (FREESTYLE CLAUDIA 3 PLUS SENSOR) MISC Change every 15 days. 6 each 1    diphenoxylate-atropine (LOMOTIL) 2.5-0.025 MG tablet TAKE 2 TABLETS BY MOUTH FOUR TIMES A DAY AS NEEDED FOR DIARRHEA 60 tablet 3    ELIQUIS ANTICOAGULANT 5 MG tablet TAKE 1 TABLET BY MOUTH TWICE A DAY 60 tablet 3    furosemide (LASIX) 20 MG tablet Take 1 tablet (20 mg) by mouth as needed (for edema). 90 tablet 3    insulin degludec (TRESIBA FLEXTOUCH) 100 UNIT/ML pen INJECT 14 UNITS SUBCUTANEOUSLY ONCE DAILY 15 mL 3    insulin pen needle (BD VIKTORIA U/F) 32G X 4 MM miscellaneous USE 5 PER  each 3    JARDIANCE 10 MG TABS tablet TAKE 1 TABLET BY MOUTH DAILY 30 tablet 11    ketoconazole (NIZORAL) 2 % external shampoo APPLY A THIN LAYER TOPICALLY TO SCALP IN SHOWER, LEAVE ON 5 MIN PRIOR TO RINSE (MAY ALSO USE AS BODY WASH) 120 mL 5    lamiVUDine (EPIVIR) 100 MG tablet Take 1 tablet (100 mg) by mouth daily. 90 tablet 2    Lenvatinib 12 MG, 3 x 4 mg capsules, (LENVIMA) 3 x 4 MG capsule Take 3 capsules (12 mg) by mouth daily. 90 capsule 0    lisinopril (ZESTRIL) 10 MG tablet Take 1 tablet (10 mg) by mouth daily. 30 tablet 11    LOKELMA 10 g PACK packet DISSOLVE AND TAKE ONE PACKET BY MOUTH THREE TIMES A DAY FOR 2 DAYS, THEN DISSOLVE AND TAKE ONE PACKET BY MOUTH DAILY. 30 packet 10    loperamide (IMODIUM) 2 MG capsule Take 1 capsule (2 mg) by mouth 4 times daily as needed for diarrhea. 120 capsule 3    MAGNESIUM OXIDE 400 (240 Mg) MG tablet TAKE 1 TABLET BY MOUTH DAILY 30 tablet 5    metoprolol succinate ER (TOPROL XL) 25 MG 24 hr tablet TAKE 1 TABLET BY MOUTH DAILY 30 tablet 3    Multiple Vitamin (DAILY-GLADIS MULTIVITAMIN) TABS TAKE 1 TABLET BY MOUTH ONCE DAILY 30 tablet 11    mycophenolate (GENERIC EQUIVALENT) 250 MG capsule Take 2 capsules (500 mg) by  mouth 2 times daily. 120 capsule 11    NIFEdipine ER OSMOTIC (PROCARDIA XL) 60 MG 24 hr tablet TAKE 1 TABLET BY MOUTH TWICE A DAY 60 tablet 3    omega 3 1000 MG CAPS Take 2,000 mg by mouth 2 times daily. 120 capsule 11    ondansetron (ZOFRAN ODT) 8 MG ODT tab Take 1 tablet (8 mg) by mouth every 8 hours as needed for nausea 15 tablet 3    pantoprazole (PROTONIX) 40 MG EC tablet TAKE 1 TABLET BY MOUTH ONCE DAILY BEFORE BREAKFAST 90 tablet 3    phosphorus tablet 250 mg 250 MG per tablet Take 1 tablet (250 mg) by mouth 4 times daily. 120 tablet 1    prednisoLONE acetate (PRED FORTE) 1 % ophthalmic suspension  (Patient not taking: No sig reported)      predniSONE (DELTASONE) 5 MG tablet Take 1 tablet (5 mg) by mouth daily. 30 tablet 11    prochlorperazine (COMPAZINE) 10 MG tablet Take 1 tablet (10 mg) by mouth every 6 hours as needed for nausea or vomiting. 30 tablet 2    rosuvastatin (CRESTOR) 20 MG tablet Take 1 tablet (20 mg) by mouth daily. 30 tablet 11    tamsulosin (FLOMAX) 0.4 MG capsule TAKE 1 CAPSULE BY MOUTH ONCE DAILY 30 capsule 11    triamcinolone (KENALOG) 0.1 % external cream Apply topically 2 times daily. 80 g 0    UltiCare Alcohol Swabs 70 % PADS 1 each by Other route 4 times daily 400 each 1    VITAMIN D3 50 MCG (2000 UT) tablet TAKE 1 TABLET BY MOUTH ONCE DAILY 30 tablet 8     Objective:  General: patient appears well in no acute distress, alert and oriented, speech clear and fluid  Skin: no visualized rash or lesions on visualized skin  Resp: Appears to be breathing comfortably without accessory muscle usage, speaking in full sentences, no audible wheezes or cough.  Psych: Coherent speech, normal rate and volume, able to articulate logical thoughts, able to abstract reason, no tangential thoughts, no hallucinations or delusions  Patient's affect is appropriate.    Laboratory Data:  Most Recent 3 CBC's:  Recent Labs   Lab Test 04/01/25  1017 03/13/25  1625 02/18/25  1354   WBC 5.6 6.0 6.1   HGB 9.3*  9.5* 9.4*   MCV 97 98 98    186 177   ANEUTAUTO 4.3 5.0 5.2    Most Recent 3 BMP's:  Recent Labs   Lab Test 04/01/25  1017 03/13/25  1625 03/13/25  1543 02/21/25  0850 02/18/25  1354    134*  --  136 137   POTASSIUM 4.5 4.6  --  5.4* 4.8   CHLORIDE 101 98  --  101 101   CO2 23 22  --  23 21*   BUN 32.5* 43.3*  --  43.1* 63.0*   CR 2.14* 1.71* 2.0* 1.98* 2.35*   ANIONGAP 14 14  --  12 15   DEBORAH 7.0* 7.7*  --  7.8* 7.5*   * 86  --  136* 116*   PROTTOTAL 6.9 7.1  --   --  7.2   ALBUMIN 4.1 4.3  --   --  4.1    Most Recent 2 LFT's:  Recent Labs   Lab Test 04/01/25  1017 03/13/25  1625   AST 23 24   ALT 20 25   ALKPHOS 84 92   BILITOTAL 0.3 0.3    Most Recent TSH and T4:  Recent Labs   Lab Test 04/01/25  1017 01/24/20  0837 08/08/18  1246   TSH 2.05   < > 0.49   T4  --   --  1.21    < > = values in this interval not displayed.   I reviewed the above labs today.    Assessment and Plan:  Metastatic hepatocellular carcinoma. He started on treatment with lenvatinib on 4/4/25, placed on hold on 4/11/25 due to severe diarrhea. Given severity of diarrhea, will trial a ramp up taking 4 mg daily for 1 week, then increase to 8 mg daily, assuming adequate control of his stools. He will notify us if the diarrhea is not well controlled. I will see him back on 4/25 to reassess symptoms. Will repeat imaging late May.     CKD. Trend is slightly worse overall. I will reach out to his nephrology team for advice on management.     Anemia. Chronic and unchanged, consistent with anemia of chronic disease. Will continue to monitor.     Hypomagnesemia. Chronic and mild. He remains on magnesium oxide 400 mg daily.     Weight loss. Recommend starting 2 nutritional supplements/day and eating snacks in between meals. If worsens, will offer referral to see our dietician.     Deconditioning and weakness. Recommend resuming daily exercise, even if less intense than previous.     Diarrhea. Secondary to lenvatinib. Recommend using  Imodium and Lomotil together if diarrhea returns.     Hypocalcemia. Worse on the last check. He remains on calcium 500 mg bid and vitamin D3 2000 international unit(s) daily. Will recheck a vitamin D level with his next labs. If hypocalcemia persists, may need to increase his supplements.     Sara Kennedy PA-C  [facility] [place] [id number] [place] [address]  [phone]    *** minutes spent on the date of the encounter doing chart review, review of test results, interpretation of tests, patient visit, and documentation     The longitudinal plan of care for the diagnosis as documented were addressed during this visit. Due to the added complexity in care, I will continue to support Miller in the subsequent management and with ongoing continuity of care.     Again, thank you for allowing me to participate in the care of your patient.        Sincerely,        Sara Kennedy PA-C    Electronically signed

## 2025-04-14 NOTE — PROGRESS NOTES
Virtual Visit Details    Type of service:  Video Visit   Video Start Time: 10:54 AM  Video End Time:11:05 AM    Originating Location (pt. Location): Home  Distant Location (provider location):  Off-site  Platform used for Video Visit: [facility]    Oncology/Hematology Visit Note  Apr 14, 2025    Reason for Visit: follow up of metastatic hepatocellular carcinoma in the setting of a prior liver transplant, toxicity follow-up     History of Present Illness: Frandy Workman is a 60 year old male with metastatic hepatocellular carcinoma in the setting of a prior liver transplant. He presented more than 5 years ago with 2 lesions in his cirrhotic liver treated with TACE followed by liver transplant in 2019. He developed peritoneal metastases in 2023 and was started on therapy with sorafenib. He did not tolerate initial full doses due to severe diarrhea and nausea but has subsequently tolerated 200 mg twice per day with good control of his disease. CT CAP on 3/13/25 showed progressive disease with increased size of a peritoneal nodule. His AFP was also rising at that time. He started on treatment with lenvatinib on 4/4/25. Due to diarrhea, treatment was held on 4/11/25. Please see previous notes for further details on the patient's history. He comes in today for routine follow up.    Interval History:  -Started lenvatinib last week and had severe diarrhea. Held chemo pills over the weekend and had one episode of diarrhea over the weekend. Has not had any normal stools yet.   -Was having 6-10 watery stools/day. No relief with 2 qid Lomotil.   -Has some fatigue. Naps about 1/2 of day. Had fatigue prior to lenvatinib.   -Was walking and lifting weights, not doing it for the past 3 weeks due to fatigue and weakness.   -Denies any fevers.   -Has abdominal pain at scar sites that is constant pressure during the day and intermittent sharp pain.  -Has some nausea, managed with Compazine. Denies vomiting.   -Eating 1-1.5 meals/day and  drinking 160 oz/day. Has lost 6 pounds since starting lenvatinib, down to 162 pounds.   -BP this morning was 110/80. Denies any dizziness or lightheadedness.    Current Outpatient Medications   Medication Sig Dispense Refill    acetaminophen (TYLENOL) 325 MG tablet TAKE 1 TABLET BY MOUTH EVERY SIX HOURS AS NEEDED FOR MILD PAIN 100 tablet 3    albuterol (PROAIR HFA/PROVENTIL HFA/VENTOLIN HFA) 108 (90 Base) MCG/ACT inhaler Inhale 2 puffs into the lungs every 4 hours as needed for shortness of breath, wheezing or cough. 18 g 3    ammonium lactate (AMLACTIN) 12 % external cream APPLY TOPICALLY DAILY AS NEEDED FOR DRY SKIN (ON FEET) 140 g 5    benzoyl peroxide 5 % external liquid Use topically in showers as a body wash 226 g 5    blood glucose (NO BRAND SPECIFIED) lancets standard Use to test blood sugar 1 times daily or as directed. 100 each 11    calcium carbonate (TUMS) 500 MG chewable tablet Take 1 tablet (500 mg) by mouth 2 times daily. 180 tablet 3    Continuous Glucose  (FREESTYLE CLAUDIA 3 READER) CECILIO 1 each continuously. use to read blood sugar per 's instructions 1 each 0    Continuous Glucose Sensor (FREESTYLE CLAUDIA 3 PLUS SENSOR) MISC Change every 15 days. 6 each 1    diphenoxylate-atropine (LOMOTIL) 2.5-0.025 MG tablet TAKE 2 TABLETS BY MOUTH FOUR TIMES A DAY AS NEEDED FOR DIARRHEA 60 tablet 3    ELIQUIS ANTICOAGULANT 5 MG tablet TAKE 1 TABLET BY MOUTH TWICE A DAY 60 tablet 3    furosemide (LASIX) 20 MG tablet Take 1 tablet (20 mg) by mouth as needed (for edema). 90 tablet 3    insulin degludec (TRESIBA FLEXTOUCH) 100 UNIT/ML pen INJECT 14 UNITS SUBCUTANEOUSLY ONCE DAILY 15 mL 3    insulin pen needle (BD VIKTORIA U/F) 32G X 4 MM miscellaneous USE 5 PER  each 3    JARDIANCE 10 MG TABS tablet TAKE 1 TABLET BY MOUTH DAILY 30 tablet 11    ketoconazole (NIZORAL) 2 % external shampoo APPLY A THIN LAYER TOPICALLY TO SCALP IN SHOWER, LEAVE ON 5 MIN PRIOR TO RINSE (MAY ALSO USE AS BODY WASH) 120  mL 5    lamiVUDine (EPIVIR) 100 MG tablet Take 1 tablet (100 mg) by mouth daily. 90 tablet 2    Lenvatinib 12 MG, 3 x 4 mg capsules, (LENVIMA) 3 x 4 MG capsule Take 3 capsules (12 mg) by mouth daily. 90 capsule 0    lisinopril (ZESTRIL) 10 MG tablet Take 1 tablet (10 mg) by mouth daily. 30 tablet 11    LOKELMA 10 g PACK packet DISSOLVE AND TAKE ONE PACKET BY MOUTH THREE TIMES A DAY FOR 2 DAYS, THEN DISSOLVE AND TAKE ONE PACKET BY MOUTH DAILY. 30 packet 10    loperamide (IMODIUM) 2 MG capsule Take 1 capsule (2 mg) by mouth 4 times daily as needed for diarrhea. 120 capsule 3    MAGNESIUM OXIDE 400 (240 Mg) MG tablet TAKE 1 TABLET BY MOUTH DAILY 30 tablet 5    metoprolol succinate ER (TOPROL XL) 25 MG 24 hr tablet TAKE 1 TABLET BY MOUTH DAILY 30 tablet 3    Multiple Vitamin (DAILY-GLADIS MULTIVITAMIN) TABS TAKE 1 TABLET BY MOUTH ONCE DAILY 30 tablet 11    mycophenolate (GENERIC EQUIVALENT) 250 MG capsule Take 2 capsules (500 mg) by mouth 2 times daily. 120 capsule 11    NIFEdipine ER OSMOTIC (PROCARDIA XL) 60 MG 24 hr tablet TAKE 1 TABLET BY MOUTH TWICE A DAY 60 tablet 3    omega 3 1000 MG CAPS Take 2,000 mg by mouth 2 times daily. 120 capsule 11    ondansetron (ZOFRAN ODT) 8 MG ODT tab Take 1 tablet (8 mg) by mouth every 8 hours as needed for nausea 15 tablet 3    pantoprazole (PROTONIX) 40 MG EC tablet TAKE 1 TABLET BY MOUTH ONCE DAILY BEFORE BREAKFAST 90 tablet 3    phosphorus tablet 250 mg 250 MG per tablet Take 1 tablet (250 mg) by mouth 4 times daily. 120 tablet 1    prednisoLONE acetate (PRED FORTE) 1 % ophthalmic suspension  (Patient not taking: No sig reported)      predniSONE (DELTASONE) 5 MG tablet Take 1 tablet (5 mg) by mouth daily. 30 tablet 11    prochlorperazine (COMPAZINE) 10 MG tablet Take 1 tablet (10 mg) by mouth every 6 hours as needed for nausea or vomiting. 30 tablet 2    rosuvastatin (CRESTOR) 20 MG tablet Take 1 tablet (20 mg) by mouth daily. 30 tablet 11    tamsulosin (FLOMAX) 0.4 MG capsule  TAKE 1 CAPSULE BY MOUTH ONCE DAILY 30 capsule 11    triamcinolone (KENALOG) 0.1 % external cream Apply topically 2 times daily. 80 g 0    UltiCare Alcohol Swabs 70 % PADS 1 each by Other route 4 times daily 400 each 1    VITAMIN D3 50 MCG (2000 UT) tablet TAKE 1 TABLET BY MOUTH ONCE DAILY 30 tablet 8     Objective:  General: patient appears well in no acute distress, alert and oriented, speech clear and fluid  Skin: no visualized rash or lesions on visualized skin  Resp: Appears to be breathing comfortably without accessory muscle usage, speaking in full sentences, no audible wheezes or cough.  Psych: Coherent speech, normal rate and volume, able to articulate logical thoughts, able to abstract reason, no tangential thoughts, no hallucinations or delusions  Patient's affect is appropriate.    Laboratory Data:  Most Recent 3 CBC's:  Recent Labs   Lab Test 04/01/25  1017 03/13/25  1625 02/18/25  1354   WBC 5.6 6.0 6.1   HGB 9.3* 9.5* 9.4*   MCV 97 98 98    186 177   ANEUTAUTO 4.3 5.0 5.2    Most Recent 3 BMP's:  Recent Labs   Lab Test 04/01/25  1017 03/13/25  1625 03/13/25  1543 02/21/25  0850 02/18/25  1354    134*  --  136 137   POTASSIUM 4.5 4.6  --  5.4* 4.8   CHLORIDE 101 98  --  101 101   CO2 23 22  --  23 21*   BUN 32.5* 43.3*  --  43.1* 63.0*   CR 2.14* 1.71* 2.0* 1.98* 2.35*   ANIONGAP 14 14  --  12 15   DEBORAH 7.0* 7.7*  --  7.8* 7.5*   * 86  --  136* 116*   PROTTOTAL 6.9 7.1  --   --  7.2   ALBUMIN 4.1 4.3  --   --  4.1    Most Recent 2 LFT's:  Recent Labs   Lab Test 04/01/25  1017 03/13/25  1625   AST 23 24   ALT 20 25   ALKPHOS 84 92   BILITOTAL 0.3 0.3    Most Recent TSH and T4:  Recent Labs   Lab Test 04/01/25  1017 01/24/20  0837 08/08/18  1246   TSH 2.05   < > 0.49   T4  --   --  1.21    < > = values in this interval not displayed.   I reviewed the above labs today.    Assessment and Plan:  Metastatic hepatocellular carcinoma. He started on treatment with lenvatinib on 4/4/25, placed  on hold on 4/11/25 due to severe diarrhea. Given severity of diarrhea, will trial a ramp up taking 4 mg daily for 1 week, then increase to 8 mg daily, assuming adequate control of his stools. He will notify us if the diarrhea is not well controlled. I will see him back on 4/25 to reassess symptoms. Will repeat imaging late May.     CKD. Trend is slightly worse overall. I will reach out to his nephrology team for advice on management.     Anemia. Chronic and unchanged, consistent with anemia of chronic disease. Will continue to monitor.     Hypomagnesemia. Chronic and mild. He remains on magnesium oxide 400 mg daily.     Weight loss. Recommend starting 2 nutritional supplements/day and eating snacks in between meals. If worsens, will offer referral to see our dietician.     Deconditioning and weakness. Recommend resuming daily exercise, even if less intense than previous.     Diarrhea. Secondary to lenvatinib. Recommend using Imodium and Lomotil together if diarrhea returns.     Hypocalcemia. Worse on the last check. He remains on calcium 500 mg bid and vitamin D3 2000 international unit(s) daily. Will recheck a vitamin D level with his next labs. If hypocalcemia persists, may need to increase his supplements. May be secondary to poor po intake and hypomagnesemia.     Sara Kennedy PA-C  [facility]  [address] [place] [address] [phone]    42 minutes spent on the date of the encounter doing chart review, review of test results, interpretation of tests, patient visit, and documentation     The longitudinal plan of care for the diagnosis as documented were addressed during this visit. Due to the added complexity in care, I will continue to support Miller in the subsequent management and with ongoing continuity of care.

## 2025-04-21 ENCOUNTER — TELEPHONE (OUTPATIENT)
Dept: ONCOLOGY | Facility: CLINIC | Age: 60
End: 2025-04-21

## 2025-04-21 NOTE — ORAL ONC MGMT
Oral Chemotherapy Monitoring Program     Placed call to patient in follow up of oral chemotherapy. Left message requesting call back. No drug names were mentioned. Will update when response received.     The purpose of this call was to check in after restarting lenvatinib on 4/14 at 4 mg dose.    Becky Ortiz, LesD  Hematology/Oncology Clinical Pharmacist  [facility]  [phone]

## 2025-04-22 DIAGNOSIS — E83.39 HYPOPHOSPHATEMIA: ICD-10-CM

## 2025-04-22 DIAGNOSIS — I12.9 HYPERTENSION, RENAL: ICD-10-CM

## 2025-04-22 DIAGNOSIS — E83.42 HYPOMAGNESEMIA: ICD-10-CM

## 2025-04-22 DIAGNOSIS — N40.1 BENIGN PROSTATIC HYPERPLASIA WITH LOWER URINARY TRACT SYMPTOMS, SYMPTOM DETAILS UNSPECIFIED: ICD-10-CM

## 2025-04-22 RX ORDER — NIFEDIPINE 60 MG/1
60 TABLET, EXTENDED RELEASE ORAL 2 TIMES DAILY
Qty: 60 TABLET | Refills: 3 | Status: SHIPPED | OUTPATIENT
Start: 2025-04-22

## 2025-04-22 RX ORDER — TAMSULOSIN HYDROCHLORIDE 0.4 MG/1
0.4 CAPSULE ORAL DAILY
Qty: 30 CAPSULE | Refills: 8 | Status: SHIPPED | OUTPATIENT
Start: 2025-04-22

## 2025-04-22 NOTE — TELEPHONE ENCOUNTER
Last Written Prescription:   Disp Refills Start End YASMINE   tamsulosin (FLOMAX) 0.4 MG capsule 30 capsule 11 5/14/2024 -- No   Sig - Route: TAKE 1 CAPSULE BY MOUTH ONCE DAILY - Oral     ----------------------  Last Visit Date:   12/5/2024  [facility]      Future Visit Date: 6/5/25  ----------------------      Request from pharmacy:  Requested Prescriptions   Pending Prescriptions Disp Refills    tamsulosin (FLOMAX) 0.4 MG capsule [Pharmacy Med Name: Tamsulosin HCl 0.4MG CAPS] 30 capsule 11     Sig: TAKE 1 CAPSULE BY MOUTH ONCE DAILY       Alpha Blockers Passed - 4/22/2025 10:33 AM        Passed - Most recent blood pressure under 140/90 in past 12 months     BP Readings from Last 3 Encounters:   03/11/25 115/74   02/18/25 124/67   12/18/24 114/66       No data recorded            Passed - Patient does not have Tadalafil, Vardenafil, or Sildenafil on their medication list        Passed - Medication is active on med list and the sig matches. RN to manually verify dose and sig if red X/fail.     If the protocol passes (green check), you do not need to verify med dose and sig.    A prescription matches if they are the same clinical intention.    For Example: once daily and every morning are the same.    The protocol can not identify upper and lower case letters as matching and will fail.     For Example: Take 1 tablet (50 mg) by mouth daily     TAKE 1 TABLET (50 MG) BY MOUTH DAILY    For all fails (red x), verify dose and sig.    If the refill does match what is on file, the RN can still proceed to approve the refill request.       If they do not match, route to the appropriate provider.             Passed - Recent (12 mo) or future (90 days) visit within the authorizing provider's specialty     The patient must have completed an in-person or virtual visit within the past 12 months or has a future visit scheduled within the next 90 days with the authorizing provider s specialty.   Urgent care and e-visits do not qualify as an office visit for this protocol.          Passed - Medication indicated for associated diagnosis     Medication is associated with one or more of the following diagnoses:  Benign prostatic hyperplasia  Disorder of the urinary system  Erectile dysfunction  Neurogenic bladder  Overactive bladder  Raynaud s  Ureteral stent-related symptoms  Urinary retention  Posttraumatic Stress Disorder  Lower urinary tract symptoms  Hypertensive disorder  Urinary frequency due to benign prostatic hypertrophy          Passed - Patient is 18 years of age or older

## 2025-04-23 ENCOUNTER — TELEPHONE (OUTPATIENT)
Dept: ONCOLOGY | Facility: CLINIC | Age: 60
End: 2025-04-23
Payer: MEDICARE

## 2025-04-23 ENCOUNTER — LAB (OUTPATIENT)
Dept: LAB | Facility: CLINIC | Age: 60
End: 2025-04-23
Attending: PHYSICIAN ASSISTANT
Payer: MEDICARE

## 2025-04-23 DIAGNOSIS — Z94.4 LIVER REPLACED BY TRANSPLANT (H): ICD-10-CM

## 2025-04-23 DIAGNOSIS — E83.51 HYPOCALCEMIA: ICD-10-CM

## 2025-04-23 DIAGNOSIS — C78.6 MALIGNANT NEOPLASM METASTATIC TO PERITONEUM (H): ICD-10-CM

## 2025-04-23 DIAGNOSIS — C22.0 HCC (HEPATOCELLULAR CARCINOMA) (H): ICD-10-CM

## 2025-04-23 LAB
ALBUMIN MFR UR ELPH: 108 MG/DL
ALBUMIN SERPL BCG-MCNC: 4.4 G/DL (ref 3.5–5.2)
ALP SERPL-CCNC: 93 U/L (ref 40–150)
ALT SERPL W P-5'-P-CCNC: 29 U/L (ref 0–70)
ANION GAP SERPL CALCULATED.3IONS-SCNC: 11 MMOL/L (ref 7–15)
AST SERPL W P-5'-P-CCNC: 26 U/L (ref 0–45)
BASOPHILS # BLD AUTO: 0 10E3/UL (ref 0–0.2)
BASOPHILS NFR BLD AUTO: 1 %
BILIRUB DIRECT SERPL-MCNC: 0.16 MG/DL (ref 0–0.3)
BILIRUB SERPL-MCNC: 0.3 MG/DL
BUN SERPL-MCNC: 44.3 MG/DL (ref 8–23)
CALCIUM SERPL-MCNC: 9.6 MG/DL (ref 8.8–10.4)
CHLORIDE SERPL-SCNC: 107 MMOL/L (ref 98–107)
CREAT SERPL-MCNC: 1.85 MG/DL (ref 0.67–1.17)
CREAT UR-MCNC: 68.5 MG/DL
EGFRCR SERPLBLD CKD-EPI 2021: 41 ML/MIN/1.73M2
EOSINOPHIL # BLD AUTO: 0.1 10E3/UL (ref 0–0.7)
EOSINOPHIL NFR BLD AUTO: 2 %
ERYTHROCYTE [DISTWIDTH] IN BLOOD BY AUTOMATED COUNT: 14.1 % (ref 10–15)
GLUCOSE SERPL-MCNC: 198 MG/DL (ref 70–99)
HCO3 SERPL-SCNC: 22 MMOL/L (ref 22–29)
HCT VFR BLD AUTO: 32.5 % (ref 40–53)
HGB BLD-MCNC: 9.8 G/DL (ref 13.3–17.7)
IMM GRANULOCYTES # BLD: 0.1 10E3/UL
IMM GRANULOCYTES NFR BLD: 1 %
LYMPHOCYTES # BLD AUTO: 1.1 10E3/UL (ref 0.8–5.3)
LYMPHOCYTES NFR BLD AUTO: 16 %
MAGNESIUM SERPL-MCNC: 1.5 MG/DL (ref 1.7–2.3)
MCH RBC QN AUTO: 29.8 PG (ref 26.5–33)
MCHC RBC AUTO-ENTMCNC: 30.2 G/DL (ref 31.5–36.5)
MCV RBC AUTO: 99 FL (ref 78–100)
MONOCYTES # BLD AUTO: 0.6 10E3/UL (ref 0–1.3)
MONOCYTES NFR BLD AUTO: 9 %
NEUTROPHILS # BLD AUTO: 4.6 10E3/UL (ref 1.6–8.3)
NEUTROPHILS NFR BLD AUTO: 72 %
NRBC # BLD AUTO: 0 10E3/UL
NRBC BLD AUTO-RTO: 0 /100
PLATELET # BLD AUTO: 214 10E3/UL (ref 150–450)
POTASSIUM SERPL-SCNC: 5 MMOL/L (ref 3.4–5.3)
PROT SERPL-MCNC: 7.3 G/DL (ref 6.4–8.3)
PROT/CREAT 24H UR: 1.58 MG/MG CR (ref 0–0.2)
RBC # BLD AUTO: 3.29 10E6/UL (ref 4.4–5.9)
SODIUM SERPL-SCNC: 140 MMOL/L (ref 135–145)
VIT D+METAB SERPL-MCNC: 41 NG/ML (ref 20–50)
WBC # BLD AUTO: 6.5 10E3/UL (ref 4–11)

## 2025-04-23 PROCEDURE — 84155 ASSAY OF PROTEIN SERUM: CPT

## 2025-04-23 PROCEDURE — 82306 VITAMIN D 25 HYDROXY: CPT

## 2025-04-23 PROCEDURE — 36415 COLL VENOUS BLD VENIPUNCTURE: CPT

## 2025-04-23 PROCEDURE — 84156 ASSAY OF PROTEIN URINE: CPT

## 2025-04-23 PROCEDURE — 83735 ASSAY OF MAGNESIUM: CPT

## 2025-04-23 PROCEDURE — 82248 BILIRUBIN DIRECT: CPT

## 2025-04-23 PROCEDURE — 85004 AUTOMATED DIFF WBC COUNT: CPT

## 2025-04-23 NOTE — ORAL ONC MGMT
Oral Chemotherapy Monitoring Program     Placed call to patient in follow up of Lenvima therapy. Miller said he resumed Lenvima at the dose of 4mg daily on 4/14/2025. He said he takes it at ~10 AM each morning. He said that for the last 10 days he has been taking loperamide 2 tablets every morning at around 10 AM and then repeating in the evening with another 2 tablets on a regular schedule. He also takes 2 Lomotil every 4 hours starting each morning at around 8AM and taking a total of 8 per day. He said about half of the days he has no loose stools and actually no stools those days. And the other half of the days he has 1-4 stools in the morning which sometimes are firmer but also include some watery fluid. On 4/21/2025 he increased the daily dose of Lenvima to 8mg.   He has not tried to take any as needed doses if antidiarrheal medication. He said he drinks 130 ounces of water per day and has done this for some time, predating his Lenvima therapy. He denied any other adverse effects since resuming Lenvima.  We reviewed package labeling for Loperamide which can be used for any episode of loose stools. Miller said he was happy to take an additional dose of Loperamide if he has a loose stool and he will see if that provides him better relief. He did not have any other questions or concerns at this time. He agreed to call us if anything changes for him or if his stools become more frequent or hard to manage. He also has an office visit on Friday 4/25/2025. He expressed understanding and agreement with this plan and thanked me for the call and care.    Americo Culver PharmD  [facility]  [phone]  April 23, 2025

## 2025-04-23 NOTE — NURSING NOTE
Chief Complaint   Patient presents with    Labs Only     Labs drawn with  by RN.      Patient tolerated well.    Lea Tucker RN

## 2025-04-24 RX ORDER — SOD PHOS DI, MONO/K PHOS MONO 250 MG
250 TABLET ORAL 4 TIMES DAILY
Qty: 120 TABLET | Refills: 1 | OUTPATIENT
Start: 2025-04-24

## 2025-04-24 RX ORDER — LANOLIN ALCOHOL/MO/W.PET/CERES
400 CREAM (GRAM) TOPICAL DAILY
Qty: 90 TABLET | Refills: 3 | Status: SHIPPED | OUTPATIENT
Start: 2025-04-24 | End: 2025-04-25

## 2025-04-24 NOTE — TELEPHONE ENCOUNTER
"Magnesium oxide 400mg tab and phosphorus tablet 250mg tablet  Last prescribing provider: Dr. Villarreal     Last clinic visit date: 4/14/25 w/ Sara Kennedy    Recommendations for requested medication (if none, N/A): 4/14/25, \"Hypomagnesemia. Chronic and mild. He remains on magnesium oxide 400 mg daily.\"  NA for phsophorus    Any other pertinent information (if none, N/A): NA    Refilled: Y/N, if NO, why?    "

## 2025-05-05 ENCOUNTER — LAB (OUTPATIENT)
Dept: LAB | Facility: CLINIC | Age: 60
End: 2025-05-05
Payer: MEDICARE

## 2025-05-05 DIAGNOSIS — Z79.899 ENCOUNTER FOR LONG-TERM (CURRENT) USE OF MEDICATIONS: ICD-10-CM

## 2025-05-05 DIAGNOSIS — C78.6 MALIGNANT NEOPLASM METASTATIC TO PERITONEUM (H): ICD-10-CM

## 2025-05-05 DIAGNOSIS — C22.0 HCC (HEPATOCELLULAR CARCINOMA) (H): ICD-10-CM

## 2025-05-05 LAB
ALBUMIN MFR UR ELPH: 130 MG/DL
ALBUMIN SERPL BCG-MCNC: 4.5 G/DL (ref 3.5–5.2)
ALP SERPL-CCNC: 75 U/L (ref 40–150)
ALT SERPL W P-5'-P-CCNC: 27 U/L (ref 0–70)
ANION GAP SERPL CALCULATED.3IONS-SCNC: 12 MMOL/L (ref 7–15)
AST SERPL W P-5'-P-CCNC: 24 U/L (ref 0–45)
BASOPHILS # BLD AUTO: 0 10E3/UL (ref 0–0.2)
BASOPHILS NFR BLD AUTO: 1 %
BILIRUB SERPL-MCNC: 0.4 MG/DL
BUN SERPL-MCNC: 43.1 MG/DL (ref 8–23)
CALCIUM SERPL-MCNC: 9.3 MG/DL (ref 8.8–10.4)
CHLORIDE SERPL-SCNC: 104 MMOL/L (ref 98–107)
CREAT SERPL-MCNC: 2.09 MG/DL (ref 0.67–1.17)
CREAT UR-MCNC: 113 MG/DL
EGFRCR SERPLBLD CKD-EPI 2021: 35 ML/MIN/1.73M2
EOSINOPHIL # BLD AUTO: 0.1 10E3/UL (ref 0–0.7)
EOSINOPHIL NFR BLD AUTO: 2 %
ERYTHROCYTE [DISTWIDTH] IN BLOOD BY AUTOMATED COUNT: 14 % (ref 10–15)
GLUCOSE SERPL-MCNC: 99 MG/DL (ref 70–99)
HCO3 SERPL-SCNC: 24 MMOL/L (ref 22–29)
HCT VFR BLD AUTO: 31.5 % (ref 40–53)
HGB BLD-MCNC: 9.7 G/DL (ref 13.3–17.7)
IMM GRANULOCYTES # BLD: 0 10E3/UL
IMM GRANULOCYTES NFR BLD: 1 %
LYMPHOCYTES # BLD AUTO: 1.1 10E3/UL (ref 0.8–5.3)
LYMPHOCYTES NFR BLD AUTO: 18 %
MAGNESIUM SERPL-MCNC: 1.6 MG/DL (ref 1.7–2.3)
MCH RBC QN AUTO: 29.7 PG (ref 26.5–33)
MCHC RBC AUTO-ENTMCNC: 30.8 G/DL (ref 31.5–36.5)
MCV RBC AUTO: 96 FL (ref 78–100)
MONOCYTES # BLD AUTO: 0.5 10E3/UL (ref 0–1.3)
MONOCYTES NFR BLD AUTO: 9 %
NEUTROPHILS # BLD AUTO: 4.3 10E3/UL (ref 1.6–8.3)
NEUTROPHILS NFR BLD AUTO: 71 %
NRBC # BLD AUTO: 0 10E3/UL
NRBC BLD AUTO-RTO: 0 /100
PLATELET # BLD AUTO: 152 10E3/UL (ref 150–450)
POTASSIUM SERPL-SCNC: 4.7 MMOL/L (ref 3.4–5.3)
PROT SERPL-MCNC: 7.2 G/DL (ref 6.4–8.3)
PROT/CREAT 24H UR: 1.15 MG/MG CR (ref 0–0.2)
RBC # BLD AUTO: 3.27 10E6/UL (ref 4.4–5.9)
SODIUM SERPL-SCNC: 140 MMOL/L (ref 135–145)
T4 FREE SERPL-MCNC: 1.48 NG/DL (ref 0.9–1.7)
TSH SERPL DL<=0.005 MIU/L-ACNC: 5.61 UIU/ML (ref 0.3–4.2)
WBC # BLD AUTO: 6 10E3/UL (ref 4–11)

## 2025-05-05 PROCEDURE — 85025 COMPLETE CBC W/AUTO DIFF WBC: CPT | Performed by: PATHOLOGY

## 2025-05-05 PROCEDURE — 36415 COLL VENOUS BLD VENIPUNCTURE: CPT | Performed by: PATHOLOGY

## 2025-05-05 PROCEDURE — 84439 ASSAY OF FREE THYROXINE: CPT | Performed by: PATHOLOGY

## 2025-05-05 PROCEDURE — 80053 COMPREHEN METABOLIC PANEL: CPT | Performed by: PATHOLOGY

## 2025-05-05 PROCEDURE — 83735 ASSAY OF MAGNESIUM: CPT | Performed by: PATHOLOGY

## 2025-05-05 PROCEDURE — 84156 ASSAY OF PROTEIN URINE: CPT | Performed by: PATHOLOGY

## 2025-05-05 PROCEDURE — 84443 ASSAY THYROID STIM HORMONE: CPT | Mod: GA | Performed by: PATHOLOGY

## 2025-05-05 NOTE — PROGRESS NOTES
Oral Chemotherapy Monitoring Program  Lab Follow Up    Reviewed lab results from 5/5/25.        4/11/2025     2:00 PM 4/14/2025    10:02 AM 4/18/2025     9:00 AM 4/21/2025    10:03 AM 4/21/2025    12:00 PM 4/23/2025    10:00 AM 5/5/2025     2:00 PM   ORAL CHEMOTHERAPY   Assessment Type Initial Follow up Other Left Voicemail Other Left Voicemail Initial Follow up Lab Monitoring   Diagnosis Code Hepatocellular Cancer Hepatocellular Cancer Hepatocellular Cancer  Hepatocellular Cancer Hepatocellular Cancer Hepatocellular Cancer   Providers Dr. Cherelle Villarreal   [facility]   Is this patient followed by the [facility] OC team? No  No  No     Drug Name Lenvima (lenvatinib) Lenvima (lenvatinib) Lenvima (lenvatinib) Lenvima (lenvatinib) Lenvima (lenvatinib) Lenvima (lenvatinib) Lenvima (lenvatinib)   Dose 12 mg 4 mg 4 mg 8 mg 4 mg 8 mg 8 mg   Current Schedule Daily Daily Daily Daily Daily Daily Daily   Cycle Details Drug on Hold Continuous  Continuous  Continuous Continuous   Start Date of Last Cycle 4/4/2025 4/14/2025 4/21/2025    Adherence Assessment      Adherent    Adverse Effects      Diarrhea    Diarrhea      Grade 1    Pharmacist Intervention(diarrhea)      Yes    Intervention(s)      Patient education;OTC recommendation        Labs:  _  Result Component Current Result Ref Range   Sodium 140 (5/5/2025) 135 - 145 mmol/L     _  Result Component Current Result Ref Range   Potassium 4.7 (5/5/2025) 3.4 - 5.3 mmol/L     _  Result Component Current Result Ref Range   Calcium 9.3 (5/5/2025) 8.8 - 10.4 mg/dL     _  Result Component Current Result Ref Range   Magnesium 1.6 (L) (5/5/2025) 1.7 - 2.3 mg/dL     No results found for Phos within last 30 days.     _  Result Component Current Result Ref Range   Albumin 4.5 (5/5/2025) 3.5 - 5.2 g/dL     _  Result Component Current Result Ref Range   Urea Nitrogen 43.1 (H) (5/5/2025)  8.0 - 23.0 mg/dL     _  Result Component Current Result Ref Range   Creatinine 2.09 (H) (5/5/2025) 0.67 - 1.17 mg/dL     _  Result Component Current Result Ref Range   AST 24 (5/5/2025) 0 - 45 U/L     _  Result Component Current Result Ref Range   ALT 27 (5/5/2025) 0 - 70 U/L     _  Result Component Current Result Ref Range   Bilirubin Total 0.4 (5/5/2025) <=1.2 mg/dL     _  Result Component Current Result Ref Range   WBC Count 6.0 (5/5/2025) 4.0 - 11.0 10e3/uL     _  Result Component Current Result Ref Range   Hemoglobin 9.7 (L) (5/5/2025) 13.3 - 17.7 g/dL     _  Result Component Current Result Ref Range   Platelet Count 152 (5/5/2025) 150 - 450 10e3/uL     _  Result Component Current Result Ref Range   Absolute Neutrophils 4.3 (5/5/2025) 1.6 - 8.3 10e3/uL     No results found for ANC within last 30 days.        Assessment & Plan:  sCr is slightly higher and TSH slightly elevated.  Discussed with LILLIAN Ortez, who advises no change at this time.    No changes with Lenvima needed at this time.    Patient not contacted as patient was contacted by [facility]    Samira Torres, PharmD, BCOP  Oncology Clinical Pharmacy Specialist  [facility]  [phone]

## 2025-05-06 LAB
ATRIAL RATE - MUSE: 63 BPM
DIASTOLIC BLOOD PRESSURE - MUSE: NORMAL MMHG
INTERPRETATION ECG - MUSE: NORMAL
P AXIS - MUSE: 16 DEGREES
PR INTERVAL - MUSE: 162 MS
QRS DURATION - MUSE: 70 MS
QT - MUSE: 408 MS
QTC - MUSE: 417 MS
R AXIS - MUSE: 26 DEGREES
SYSTOLIC BLOOD PRESSURE - MUSE: NORMAL MMHG
T AXIS - MUSE: 36 DEGREES
VENTRICULAR RATE- MUSE: 63 BPM

## 2025-05-12 ENCOUNTER — OFFICE VISIT (OUTPATIENT)
Dept: ORTHOPEDICS | Facility: CLINIC | Age: 60
End: 2025-05-12
Payer: MEDICARE

## 2025-05-12 DIAGNOSIS — E11.49 TYPE II OR UNSPECIFIED TYPE DIABETES MELLITUS WITH NEUROLOGICAL MANIFESTATIONS, NOT STATED AS UNCONTROLLED(250.60) (H): Primary | ICD-10-CM

## 2025-05-12 DIAGNOSIS — N18.31 TYPE 2 DIABETES MELLITUS WITH STAGE 3A CHRONIC KIDNEY DISEASE, WITH LONG-TERM CURRENT USE OF INSULIN (H): ICD-10-CM

## 2025-05-12 DIAGNOSIS — Z79.4 TYPE 2 DIABETES MELLITUS WITH STAGE 3A CHRONIC KIDNEY DISEASE, WITH LONG-TERM CURRENT USE OF INSULIN (H): ICD-10-CM

## 2025-05-12 DIAGNOSIS — E11.22 TYPE 2 DIABETES MELLITUS WITH STAGE 3A CHRONIC KIDNEY DISEASE, WITH LONG-TERM CURRENT USE OF INSULIN (H): ICD-10-CM

## 2025-05-12 DIAGNOSIS — E11.69 TYPE 2 DIABETES MELLITUS WITH DIABETIC FOOT DEFORMITY (H): ICD-10-CM

## 2025-05-12 DIAGNOSIS — E11.628 TYPE 2 DIABETES MELLITUS WITH PRESSURE CALLUS (H): ICD-10-CM

## 2025-05-12 DIAGNOSIS — C22.0 HCC (HEPATOCELLULAR CARCINOMA) (H): Primary | ICD-10-CM

## 2025-05-12 DIAGNOSIS — L60.2 ONYCHAUXIS: ICD-10-CM

## 2025-05-12 DIAGNOSIS — C78.6 MALIGNANT NEOPLASM METASTATIC TO PERITONEUM (H): ICD-10-CM

## 2025-05-12 DIAGNOSIS — M21.969 TYPE 2 DIABETES MELLITUS WITH DIABETIC FOOT DEFORMITY (H): ICD-10-CM

## 2025-05-12 DIAGNOSIS — L84 TYPE 2 DIABETES MELLITUS WITH PRESSURE CALLUS (H): ICD-10-CM

## 2025-05-12 PROCEDURE — 11057 PARNG/CUTG B9 HYPRKR LES >4: CPT | Performed by: PODIATRIST

## 2025-05-12 PROCEDURE — 99207 PR DROP WITH A PROCEDURE: CPT | Performed by: PODIATRIST

## 2025-05-12 PROCEDURE — 11719 TRIM NAIL(S) ANY NUMBER: CPT | Mod: XS | Performed by: PODIATRIST

## 2025-05-12 NOTE — LETTER
5/12/2025      Frandy Workman  [address] [facility] [address]      Dear Colleague,    Thank you for referring your patient, Frandy Workman, to the [facility] [place]. Please see a copy of my visit note below.    Past Medical History:   Diagnosis Date     Anemia 2013     Arthritis      BPH (benign prostatic hyperplasia)      CAD (coronary artery disease) 04/01/2019     Cholelithiasis      Conductive hearing loss 08/16/2017     Depressive disorder 1986    Suffer effects throughout life     Gastroesophageal reflux disease 12/01/2014     HCC (hepatocellular carcinoma) (H) 01/22/2019     History of blood transfusion 2019    Had blood transfussion until Dec 2022     History of diabetic retinopathy 07/2018     HTN (hypertension) 11/20/2019     Hyperlipidemia      Liver cirrhosis secondary to ESTRADA (H)      Liver transplanted (H) 11/11/2019     Portal vein thrombosis 04/11/2019     SCC (squamous cell carcinoma) 02/15/2023    02/15/23:  Left temporal scalp     Type II diabetes mellitus (H)      Patient Active Problem List   Diagnosis     Bipolar affective disorder in remission     Esophageal varices determined by endoscopy (H)     Erectile dysfunction due to diseases classified elsewhere     HCC (hepatocellular carcinoma) (H)     Equivocal stress echocardiogram     Type 2 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, unspecified whether long term insulin use (H)     Status post coronary angiogram     CAD (coronary artery disease)     Liver transplant recipient (H)     Status post liver transplantation (H)     Immunosuppressed status     Benign essential hypertension     Anemia of chronic renal failure, stage 3a (H)     History of coronary artery disease     Dyslipidemia     Excessive sweating     Stage 3b chronic kidney disease (H)     Anemia of chronic renal failure, stage 3b (H)     NSTEMI (non-ST elevated myocardial infarction) (H)      Vitreous hemorrhage of left eye (H)     Proliferative diabetic retinopathy of both eyes associated with type 2 diabetes mellitus, unspecified proliferative retinopathy type (H)     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)     Hyperkalemia     ARIELA (obstructive sleep apnea)     History of nonmelanoma skin cancer     Neoplasm of unspecified behavior of bone, soft tissue, and skin     SCC (squamous cell carcinoma)     AK (actinic keratosis)     Past Surgical History:   Procedure Laterality Date     ABDOMEN SURGERY  11/11/2019    Had Liver Transplant     BIOPSY  12/2022    Had biopsy done will have additional procedure 2/15/2023     BYPASS GRAFT ARTERY CORONARY N/A 07/14/2021    Procedure: median sternotomy, on cardiopulmonary bypass, CORONARY ARTERY BYPASS GRAFT (CABG) x2 with left greater saphenous vein endoscopic harvest and left internal mammery artery harvest;  Surgeon: Tom Zapata MD;  Location: [facility] OR     CARDIAC SURGERY  7/2021    Heart Graph. and bypass surgery     CHOLECYSTECTOMY  11/11/2022    Removed with Liver Transplant     COLONOSCOPY      2015     COLONOSCOPY N/A 12/06/2019    Procedure: COLONOSCOPY, WITH POLYPECTOMY AND BIOPSY;  Surgeon: Adam Morton MD;  Location:  GI     CV CENTRAL VENOUS CATHETER PLACEMENT N/A 07/12/2021    Procedure: Central Venous Catheter Placement;  Surgeon: Fermin Polanco MD;  Location: [facility] CARDIAC CATH LAB     CV CORONARY ANGIOGRAM N/A 07/12/2021    Procedure: Coronary Angiogram;  Surgeon: Fermin Polanco MD;  Location: [facility] CARDIAC CATH LAB     CV HEART CATHETERIZATION WITH POSSIBLE INTERVENTION N/A 02/26/2019    Procedure: CORS;  Surgeon: Jagdish Hoyt MD;  Location: [facility] CARDIAC CATH LAB     CV INTRA AORTIC BALLOON N/A 07/12/2021    Procedure: Intra Aortic Balloon Pump Insertion;  Surgeon: Fermin Polanco MD;  Location: [facility] CARDIAC CATH LAB     ESOPHAGOSCOPY, GASTROSCOPY,  DUODENOSCOPY (EGD), COMBINED N/A 11/17/2016    Procedure: COMBINED ESOPHAGOSCOPY, GASTROSCOPY, DUODENOSCOPY (EGD);  Surgeon: Santi Rosas MD;  Location: [facility] GI     ESOPHAGOSCOPY, GASTROSCOPY, DUODENOSCOPY (EGD), COMBINED N/A 11/17/2017    Procedure: COMBINED ESOPHAGOSCOPY, GASTROSCOPY, DUODENOSCOPY (EGD);  EGD;  Surgeon: Santi Rosas MD;  Location: [facility] GI     ESOPHAGOSCOPY, GASTROSCOPY, DUODENOSCOPY (EGD), COMBINED N/A 12/28/2018    Procedure: EGD;  Surgeon: Santi Rosas MD;  Location:  OR     ESOPHAGOSCOPY, GASTROSCOPY, DUODENOSCOPY (EGD), COMBINED N/A 12/06/2019    Procedure: ESOPHAGOGASTRODUODENOSCOPY, WITH BIOPSY;  Surgeon: Adam Morton MD;  Location:  GI     ESOPHAGOSCOPY, GASTROSCOPY, DUODENOSCOPY (EGD), COMBINED N/A 02/13/2020    Procedure: ESOPHAGOGASTRODUODENOSCOPY (EGD);  Surgeon: Santi Rosas MD;  Location:  GI     EXCISE MASS ABDOMEN N/A 07/13/2023    Procedure: Laparoscopic lysis of adhesions, laparoscopic resection of abdominal mass;  Surgeon: Ruiz Chu MD;  [facility]     HEAD & NECK SURGERY      12/2017 at [facility].      IMPLANT GOLD WEIGHT EYELID Right 11/16/2017    Procedure: IMPLANT WEIGHT EYELID;  Right Upper Eyelid Weight, right tarsal strip lower eyelid;  Surgeon: Milana Malave MD;  Location:  OR     IR CHEMO EMBOLIZATION  01/22/2019     KNEE SURGERY Left      ORTHOPEDIC SURGERY       PAROTIDECTOMY, RADICAL NECK DISSECTION Right 11/02/2017    Procedure: PAROTIDECTOMY, RADICAL NECK DISSECTION;  Right Superfacial Parotidectomy , Facial nerve repair. with [facility] facial nerve monitor.;  Surgeon: Asiya Morgan MD;  Location: [facility] OR     PHACOEMULSIFICATION CLEAR CORNEA WITH STANDARD INTRAOCULAR LENS IMPLANT Right 01/24/2023    Procedure: PHACOEMULSIFICATION, COMPLEX CATARACT, WITH INTRAOCULAR LENS IMPLANT WITH TRYPAN RIGHT EYE;  Surgeon: Enriqueta Martin MD;  Location: [facility] OR     PHACOEMULSIFICATION WITH STANDARD  INTRAOCULAR LENS IMPLANT Left 2023    Procedure: PHACOEMULSIFICATION, COMPLEX CATARACT, WITH STANDARD INTRAOCULAR LENS IMPLANT INSERTION LEFT EYE;  Surgeon: Enriqueta Martin MD;  Location: [facility] OR     PICC INSERTION Left 2017    4fr SL BioFlo PICC, 44cm in the L basilic vein w/ tip in the low SVC     RETURN LIVER TRANSPLANT N/A 2019    Procedure: Exploratory laparotomy, hematoma evacuation, abdominal washout;  Surgeon: Александр Ramos MD;  Location: [facility] OR     TRANSPLANT LIVER RECIPIENT  DONOR N/A 2019    Procedure: TRANSPLANT, LIVER, RECIPIENT,  DONOR;  Surgeon: Александр Ramos MD;  Location: [facility] OR     VASCULAR SURGERY       Social History     Socioeconomic History     Marital status:      Spouse name: Not on file     Number of children: Not on file     Years of education: Not on file     Highest education level: Bachelor's degree (e.g., BA, AB, BS)   Occupational History     Not on file   Tobacco Use     Smoking status: Former     Types: Dip, chew, snus or snuff     Passive exposure: Past     Smokeless tobacco: Former     Types: Chew     Quit date: 10/31/2017     Tobacco comments:     Quit [place] after Doctor told me in 2017 that if I didn't I would   Vaping Use     Vaping status: Never Used   Substance and Sexual Activity     Alcohol use: No     Comment: quit 1996     Drug use: No     Sexual activity: Not Currently     Partners: Female     Birth control/protection: Condom   Other Topics Concern     Parent/sibling w/ CABG, MI or angioplasty before 65F 55M? No   Social History Narrative    Prior , [place]     Social Drivers of Health     Financial Resource Strain: Low Risk  (2024)    Financial Resource Strain      Within the past 12 months, have you or your family members you live with been unable to get utilities (heat, electricity) when it was really needed?: No   Food Insecurity: Low Risk  (2024)    Food Insecurity       Within the past 12 months, did you worry that your food would run out before you got money to buy more?: No      Within the past 12 months, did the food you bought just not last and you didn t have money to get more?: No   Transportation Needs: Low Risk  (2024)    Transportation Needs      Within the past 12 months, has lack of transportation kept you from medical appointments, getting your medicines, non-medical meetings or appointments, work, or from getting things that you need?: No   Physical Activity: Insufficiently Active (10/13/2020)    Exercise Vital Sign      Days of Exercise per Week: 1 day      Minutes of Exercise per Session: 60 min   Stress: Stress Concern Present (10/13/2020)    [employer] - Occupational Stress Questionnaire      Feeling of Stress : To some extent   Social Connections: Socially Isolated (10/13/2020)    Social Connection and Isolation Panel [NHANES]      Frequency of Communication with Friends and Family: Once a week      Frequency of Social Gatherings with Friends and Family: Once a week      Attends Adventism Services: Never      Active Member of Clubs or Organizations: No      Attends Club or Organization Meetings: Never      Marital Status:    Interpersonal Safety: Low Risk  (2024)    Interpersonal Safety      Do you feel physically and emotionally safe where you currently live?: Yes      Within the past 12 months, have you been hit, slapped, kicked or otherwise physically hurt by someone?: No      Within the past 12 months, have you been humiliated or emotionally abused in other ways by your partner or ex-partner?: No   Housing Stability: Low Risk  (2024)    Housing Stability      Do you have housing? : Yes      Are you worried about losing your housing?: No     Family History   Problem Relation Age of Onset     Skin Cancer Mother      Cancer Mother         mastectomy     Diabetes Mother          3/2016     Cerebrovascular  "Disease Mother         Passed away in Feb of this year, 80 years old.     Thyroid Disease Mother      Depression Mother      Breast Cancer Mother      Obesity Mother         280 pounds  from this and complications from D     Pancreatic Cancer Father 60     Prostate Cancer Father         Currently in Remission     Macular Degeneration Father      Glaucoma Father      Skin Cancer Father      Coronary Artery Disease Father         Started in his 70's  at 88 in Octobe2023     Colon Cancer Father      Thyroid Disease Sister      Depression Sister      Asthma Sister      Sleep Apnea Brother      Colorectal Cancer Maternal Grandmother      Cancer Maternal Grandmother      Substance Abuse Maternal Grandmother         Alcohol     Prostate Cancer Maternal Grandfather      Substance Abuse Maternal Grandfather         Alcohol     Colorectal Cancer Paternal Grandmother      Asthma Sister         Had since birth     Liver Disease No family hx of      Melanoma No family hx of      Anesthesia Reaction No family hx of      Deep Vein Thrombosis (DVT) No family hx of            Lab Results   Component Value Date    A1C 5.6 2024    A1C 5.6 2024    A1C 5.6 2024    A1C 5.7 2023    A1C 6.3 2023    A1C 6.9 2022    A1C 6.0 2020    A1C 6.3 2020    A1C 6.6 2018    A1C 6.5 2017    A1C 7.8 10/25/2016         Lab Results   Component Value Date    WBC 6.0 2025    WBC 4.4 2021     Lab Results   Component Value Date    RBC 3.27 2025    RBC 2.35 2021     Lab Results   Component Value Date    HGB 9.7 2025    HGB 7.3 2021     Lab Results   Component Value Date    HCT 31.5 2025    HCT 22.6 2021     No components found for: \"MCT\"  Lab Results   Component Value Date    MCV 96 2025    MCV 96 2021     Lab Results   Component Value Date    MCH 29.7 2025    MCH 31.1 2021     Lab Results   Component Value Date    MCHC " "30.8 05/05/2025    MCHC 32.3 06/28/2021     Lab Results   Component Value Date    RDW 14.0 05/05/2025    RDW 15.1 06/28/2021     Lab Results   Component Value Date     05/05/2025     06/28/2021     Last Comprehensive Metabolic Panel:  Lab Results   Component Value Date     05/05/2025    POTASSIUM 4.7 05/05/2025    CHLORIDE 104 05/05/2025    CO2 24 05/05/2025    ANIONGAP 12 05/05/2025    GLC 99 05/05/2025    BUN 43.1 (H) 05/05/2025    CR 2.09 (H) 05/05/2025    GFRESTIMATED 35 (L) 05/05/2025    DEBORAH 9.3 05/05/2025       Lab Results   Component Value Date    AST 24 05/05/2025    AST 9 06/28/2021     Lab Results   Component Value Date    ALT 27 05/05/2025    ALT 20 06/28/2021     No results found for: \"BILICONJ\"   Lab Results   Component Value Date    BILITOTAL 0.4 05/05/2025    BILITOTAL 0.5 06/28/2021     Lab Results   Component Value Date    ALBUMIN 4.5 05/05/2025    ALBUMIN 4.3 07/20/2022    ALBUMIN 4.0 06/28/2021     Lab Results   Component Value Date    PROTTOTAL 7.2 05/05/2025    PROTTOTAL 7.4 06/28/2021      Lab Results   Component Value Date    ALKPHOS 75 05/05/2025    ALKPHOS 98 06/28/2021       Subjective findings- 60-year-old returns clinic for Diabetes with peripheral Neuropathy and foot deformity and callus.  Relates to wearing his Diabetic shoes and insoles.  Relates to no injuries, no ulcers, no sores since we seen him last.  Relates the calluses buildup and hurt at times.  Relates he does get some pain in his feet.  Relates he needs his toenails cut.  Relates he has numbness tingling and neuropathy in his feet.     Objective findings- DP and PT are 2 out of 4 bilaterally.  Has decreased hair growth bilaterally.  Has dorsally contracted digits 2 through 5 bilaterally.  Has functional hallux limitus bilaterally.  Has nucleated hyperkeratotic tissue buildup plantar first and fifth MPJ bilaterally, plantar medial hallux right, plantar fourth MPJ right, with pain on palpation.  No tendon " voids bilaterally.  There is no erythema, no edema, no drainage, no odor, no calor bilaterally.     Assessment and plan- Onychauxis with Onychocryptosis bilaterally.  Diabetes with peripheral Neuropathy.  Hammertoes and functional Hallux Limitus bilaterally.  Tylomas bilaterally causing pain.  Diagnosis and treatment options discussed with the patient.  All the toenails were reduced bilaterally upon consent.  All the Tylomas bilaterally were sharp debrided with a 10 blade upon consent, 5+ lesions were debrided.  Continue the Diabetic shoes.  Previous notes reviewed.  Return to clinic and see me in 2 to 3 months.                                                                          Moderate level of medical decision making.     Again, thank you for allowing me to participate in the care of your patient.        Sincerely,        Lamin Gonzalez DPM    Electronically signed

## 2025-05-12 NOTE — PROGRESS NOTES
Past Medical History:   Diagnosis Date    Anemia 2013    Arthritis     BPH (benign prostatic hyperplasia)     CAD (coronary artery disease) 04/01/2019    Cholelithiasis     Conductive hearing loss 08/16/2017    Depressive disorder 1986    Suffer effects throughout life    Gastroesophageal reflux disease 12/01/2014    HCC (hepatocellular carcinoma) (H) 01/22/2019    History of blood transfusion 2019    Had blood transfussion until Dec 2022    History of diabetic retinopathy 07/2018    HTN (hypertension) 11/20/2019    Hyperlipidemia     Liver cirrhosis secondary to ESTRADA (H)     Liver transplanted (H) 11/11/2019    Portal vein thrombosis 04/11/2019    SCC (squamous cell carcinoma) 02/15/2023    02/15/23:  Left temporal scalp    Type II diabetes mellitus (H)      Patient Active Problem List   Diagnosis    Bipolar affective disorder in remission    Esophageal varices determined by endoscopy (H)    Erectile dysfunction due to diseases classified elsewhere    HCC (hepatocellular carcinoma) (H)    Equivocal stress echocardiogram    Type 2 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, unspecified whether long term insulin use (H)    Status post coronary angiogram    CAD (coronary artery disease)    Liver transplant recipient (H)    Status post liver transplantation (H)    Immunosuppressed status    Benign essential hypertension    Anemia of chronic renal failure, stage 3a (H)    History of coronary artery disease    Dyslipidemia    Excessive sweating    Stage 3b chronic kidney disease (H)    Anemia of chronic renal failure, stage 3b (H)    NSTEMI (non-ST elevated myocardial infarction) (H)    Vitreous hemorrhage of left eye (H)    Proliferative diabetic retinopathy of both eyes associated with type 2 diabetes mellitus, unspecified proliferative retinopathy type (H)    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)    Hyperkalemia    ARIELA (obstructive sleep apnea)    History of  nonmelanoma skin cancer    Neoplasm of unspecified behavior of bone, soft tissue, and skin    SCC (squamous cell carcinoma)    AK (actinic keratosis)     Past Surgical History:   Procedure Laterality Date    ABDOMEN SURGERY  11/11/2019    Had Liver Transplant    BIOPSY  12/2022    Had biopsy done will have additional procedure 2/15/2023    BYPASS GRAFT ARTERY CORONARY N/A 07/14/2021    Procedure: median sternotomy, on cardiopulmonary bypass, CORONARY ARTERY BYPASS GRAFT (CABG) x2 with left greater saphenous vein endoscopic harvest and left internal mammery artery harvest;  Surgeon: Tom Zapata MD;  [facility]    CARDIAC SURGERY  7/2021    Heart Graph. and bypass surgery    CHOLECYSTECTOMY  11/11/2022    Removed with Liver Transplant    COLONOSCOPY      2015    COLONOSCOPY N/A 12/06/2019    Procedure: COLONOSCOPY, WITH POLYPECTOMY AND BIOPSY;  Surgeon: Adam Morton MD;  Location:  GI    CV CENTRAL VENOUS CATHETER PLACEMENT N/A 07/12/2021    Procedure: Central Venous Catheter Placement;  Surgeon: Fermin Polanco MD;  Location: [facility] CARDIAC CATH LAB    CV CORONARY ANGIOGRAM N/A 07/12/2021    Procedure: Coronary Angiogram;  Surgeon: Fermin Polanco MD;  Location:  HEART CARDIAC CATH LAB    CV HEART CATHETERIZATION WITH POSSIBLE INTERVENTION N/A 02/26/2019    Procedure: CORS;  Surgeon: Jagdish Hoyt MD;  Location: [facility] CARDIAC CATH LAB    CV INTRA AORTIC BALLOON N/A 07/12/2021    Procedure: Intra Aortic Balloon Pump Insertion;  Surgeon: Fermin Polanco MD;  Location: [facility] CARDIAC CATH LAB    ESOPHAGOSCOPY, GASTROSCOPY, DUODENOSCOPY (EGD), COMBINED N/A 11/17/2016    Procedure: COMBINED ESOPHAGOSCOPY, GASTROSCOPY, DUODENOSCOPY (EGD);  Surgeon: Santi Rosas MD;  Location:  GI    ESOPHAGOSCOPY, GASTROSCOPY, DUODENOSCOPY (EGD), COMBINED N/A 11/17/2017    Procedure: COMBINED ESOPHAGOSCOPY, GASTROSCOPY, DUODENOSCOPY (EGD);   EGD;  Surgeon: Santi Rosas MD;  [facility]: [facility]    ESOPHAGOSCOPY, GASTROSCOPY, DUODENOSCOPY (EGD), COMBINED N/A 12/28/2018    Procedure: EGD;  Surgeon: Santi Rosas MD;  [facility]    ESOPHAGOSCOPY, GASTROSCOPY, DUODENOSCOPY (EGD), COMBINED N/A 12/06/2019    Procedure: ESOPHAGOGASTRODUODENOSCOPY, WITH BIOPSY;  Surgeon: Adam Morton MD;  [facility]: [facility]    ESOPHAGOSCOPY, GASTROSCOPY, DUODENOSCOPY (EGD), COMBINED N/A 02/13/2020    Procedure: ESOPHAGOGASTRODUODENOSCOPY (EGD);  Surgeon: Santi Rosas MD;  [facility]    EXCISE MASS ABDOMEN N/A 07/13/2023    Procedure: Laparoscopic lysis of adhesions, laparoscopic resection of abdominal mass;  Surgeon: Ruiz Chu MD;  Location:  OR    HEAD & NECK SURGERY      12/2017 at [facility].     IMPLANT GOLD WEIGHT EYELID Right 11/16/2017    Procedure: IMPLANT WEIGHT EYELID;  Right Upper Eyelid Weight, right tarsal strip lower eyelid;  Surgeon: Milana Malave MD;  [facility]    IR CHEMO EMBOLIZATION  01/22/2019    KNEE SURGERY Left     ORTHOPEDIC SURGERY      PAROTIDECTOMY, RADICAL NECK DISSECTION Right 11/02/2017    Procedure: PAROTIDECTOMY, RADICAL NECK DISSECTION;  Right Superfacial Parotidectomy , Facial nerve repair. with NIM facial nerve monitor.;  Surgeon: Asiya Morgan MD;  [facility]    PHACOEMULSIFICATION CLEAR CORNEA WITH STANDARD INTRAOCULAR LENS IMPLANT Right 01/24/2023    Procedure: PHACOEMULSIFICATION, COMPLEX CATARACT, WITH INTRAOCULAR LENS IMPLANT WITH TRYPAN RIGHT EYE;  Surgeon: Enriqueta Martin MD;  [facility]    PHACOEMULSIFICATION WITH STANDARD INTRAOCULAR LENS IMPLANT Left 01/03/2023    Procedure: PHACOEMULSIFICATION, COMPLEX CATARACT, WITH STANDARD INTRAOCULAR LENS IMPLANT INSERTION LEFT EYE;  Surgeon: Enriqueta Martin MD;  [facility]: [facility]    PICC INSERTION Left 11/06/2017    4fr SL BioFlo PICC, 44cm in the L basilic vein w/ tip in the low SVC    RETURN LIVER  TRANSPLANT N/A 2019    Procedure: Exploratory laparotomy, hematoma evacuation, abdominal washout;  Surgeon: Александр Ramos MD;  [facility]    TRANSPLANT LIVER RECIPIENT  DONOR N/A 2019    Procedure: TRANSPLANT, LIVER, RECIPIENT,  DONOR;  Surgeon: Александр Ramos MD;  [facility]    VASCULAR SURGERY       Social History     Socioeconomic History    Marital status:      Spouse name: Not on file    Number of children: Not on file    Years of education: Not on file    Highest education level: Bachelor's degree (e.g., BA, AB, BS)   Occupational History    Not on file   Tobacco Use    Smoking status: Former     Types: Dip, chew, snus or snuff     Passive exposure: Past    Smokeless tobacco: Former     Types: Chew     Quit date: 10/31/2017    Tobacco comments:     Quit [place] after Doctor told me in  that if I didn't I would   Vaping Use    Vaping status: Never Used   Substance and Sexual Activity    Alcohol use: No     Comment: quit 1996    Drug use: No    Sexual activity: Not Currently     Partners: Female     Birth control/protection: Condom   Other Topics Concern    Parent/sibling w/ CABG, MI or angioplasty before 65F 55M? No   Social History Narrative    Prior , [facility]     Social Drivers of Health     Financial Resource Strain: Low Risk  (2024)    Financial Resource Strain     Within the past 12 months, have you or your family members you live with been unable to get utilities (heat, electricity) when it was really needed?: No   Food Insecurity: Low Risk  (2024)    Food Insecurity     Within the past 12 months, did you worry that your food would run out before you got money to buy more?: No     Within the past 12 months, did the food you bought just not last and you didn t have money to get more?: No   Transportation Needs: Low Risk  (2024)    Transportation Needs     Within the past 12 months, has lack of transportation kept  you from medical appointments, getting your medicines, non-medical meetings or appointments, work, or from getting things that you need?: No   Physical Activity: Insufficiently Active (10/13/2020)    Exercise Vital Sign     Days of Exercise per Week: 1 day     Minutes of Exercise per Session: 60 min   Stress: Stress Concern Present (10/13/2020)    [employer] - Occupational Stress Questionnaire     Feeling of Stress : To some extent   Social Connections: Socially Isolated (10/13/2020)    Social Connection and Isolation Panel [NHANES]     Frequency of Communication with Friends and Family: Once a week     Frequency of Social Gatherings with Friends and Family: Once a week     Attends Yazidism Services: Never     Active Member of Clubs or Organizations: No     Attends Club or Organization Meetings: Never     Marital Status:    Interpersonal Safety: Low Risk  (2024)    Interpersonal Safety     Do you feel physically and emotionally safe where you currently live?: Yes     Within the past 12 months, have you been hit, slapped, kicked or otherwise physically hurt by someone?: No     Within the past 12 months, have you been humiliated or emotionally abused in other ways by your partner or ex-partner?: No   Housing Stability: Low Risk  (2024)    Housing Stability     Do you have housing? : Yes     Are you worried about losing your housing?: No     Family History   Problem Relation Age of Onset    Skin Cancer Mother     Cancer Mother         mastectomy    Diabetes Mother          3/2016    Cerebrovascular Disease Mother         Passed away in Feb of this year, 80 years old.    Thyroid Disease Mother     Depression Mother     Breast Cancer Mother     Obesity Mother         280 pounds  from this and complications from D    Pancreatic Cancer Father 60    Prostate Cancer Father         Currently in Remission    Macular Degeneration Father     Glaucoma Father     Skin  "Cancer Father     Coronary Artery Disease Father         Started in his 70's  at 88 in 2023    Colon Cancer Father     Thyroid Disease Sister     Depression Sister     Asthma Sister     Sleep Apnea Brother     Colorectal Cancer Maternal Grandmother     Cancer Maternal Grandmother     Substance Abuse Maternal Grandmother         Alcohol    Prostate Cancer Maternal Grandfather     Substance Abuse Maternal Grandfather         Alcohol    Colorectal Cancer Paternal Grandmother     Asthma Sister         Had since birth    Liver Disease No family hx of     Melanoma No family hx of     Anesthesia Reaction No family hx of     Deep Vein Thrombosis (DVT) No family hx of            Lab Results   Component Value Date    A1C 5.6 2024    A1C 5.6 2024    A1C 5.6 2024    A1C 5.7 2023    A1C 6.3 2023    A1C 6.9 2022    A1C 6.0 2020    A1C 6.3 2020    A1C 6.6 2018    A1C 6.5 2017    A1C 7.8 10/25/2016         Lab Results   Component Value Date    WBC 6.0 2025    WBC 4.4 2021     Lab Results   Component Value Date    RBC 3.27 2025    RBC 2.35 2021     Lab Results   Component Value Date    HGB 9.7 2025    HGB 7.3 2021     Lab Results   Component Value Date    HCT 31.5 2025    HCT 22.6 2021     No components found for: \"MCT\"  Lab Results   Component Value Date    MCV 96 2025    MCV 96 2021     Lab Results   Component Value Date    MCH 29.7 2025    MCH 31.1 2021     Lab Results   Component Value Date    MCHC 30.8 2025    MCHC 32.3 2021     Lab Results   Component Value Date    RDW 14.0 2025    RDW 15.1 2021     Lab Results   Component Value Date     2025     2021     Last Comprehensive Metabolic Panel:  Lab Results   Component Value Date     2025    POTASSIUM 4.7 2025    CHLORIDE 104 2025    CO2 24 2025    ANIONGAP 12 " "05/05/2025    GLC 99 05/05/2025    BUN 43.1 (H) 05/05/2025    CR 2.09 (H) 05/05/2025    GFRESTIMATED 35 (L) 05/05/2025    DEBORAH 9.3 05/05/2025       Lab Results   Component Value Date    AST 24 05/05/2025    AST 9 06/28/2021     Lab Results   Component Value Date    ALT 27 05/05/2025    ALT 20 06/28/2021     No results found for: \"BILICONJ\"   Lab Results   Component Value Date    BILITOTAL 0.4 05/05/2025    BILITOTAL 0.5 06/28/2021     Lab Results   Component Value Date    ALBUMIN 4.5 05/05/2025    ALBUMIN 4.3 07/20/2022    ALBUMIN 4.0 06/28/2021     Lab Results   Component Value Date    PROTTOTAL 7.2 05/05/2025    PROTTOTAL 7.4 06/28/2021      Lab Results   Component Value Date    ALKPHOS 75 05/05/2025    ALKPHOS 98 06/28/2021       Subjective findings- 60-year-old returns clinic for Diabetes with peripheral Neuropathy and foot deformity and callus.  Relates to wearing his Diabetic shoes and insoles.  Relates to no injuries, no ulcers, no sores since we seen him last.  Relates the calluses buildup and hurt at times.  Relates he does get some pain in his feet.  Relates he needs his toenails cut.  Relates he has numbness tingling and neuropathy in his feet.     Objective findings- DP and PT are 2 out of 4 bilaterally.  Has decreased hair growth bilaterally.  Has dorsally contracted digits 2 through 5 bilaterally.  Has functional hallux limitus bilaterally.  Has nucleated hyperkeratotic tissue buildup plantar first and fifth MPJ bilaterally, plantar medial hallux right, plantar fourth MPJ right, with pain on palpation.  No tendon voids bilaterally.  There is no erythema, no edema, no drainage, no odor, no calor bilaterally.     Assessment and plan- Onychauxis with Onychocryptosis bilaterally.  Diabetes with peripheral Neuropathy.  Hammertoes and functional Hallux Limitus bilaterally.  Tylomas bilaterally causing pain.  Diagnosis and treatment options discussed with the patient.  All the toenails were reduced bilaterally " upon consent.  All the Tylomas bilaterally were sharp debrided with a 10 blade upon consent, 5+ lesions were debrided.  Continue the Diabetic shoes.  Previous notes reviewed.  Return to clinic and see me in 2 to 3 months.                                                                          Moderate level of medical decision making.

## 2025-05-21 DIAGNOSIS — Z95.1 S/P CABG (CORONARY ARTERY BYPASS GRAFT): ICD-10-CM

## 2025-05-21 DIAGNOSIS — E83.39 HYPOPHOSPHATEMIA: ICD-10-CM

## 2025-05-21 NOTE — TELEPHONE ENCOUNTER
Medication:PHOSPHORUS tablet   Last prescribing provider:Dr. Villarreal  Last clinic visit date: 4/25/2025 Sara Kennedy PA-C  Recommendations for requested medication:supplement  Last [facility] on March 13 was Phos 3.4  Any other pertinent information:routed to last provider Sara Kennedy PA-C

## 2025-05-22 ENCOUNTER — ANCILLARY PROCEDURE (OUTPATIENT)
Dept: CT IMAGING | Facility: CLINIC | Age: 60
End: 2025-05-22
Attending: INTERNAL MEDICINE
Payer: MEDICARE

## 2025-05-22 ENCOUNTER — LAB (OUTPATIENT)
Dept: LAB | Facility: CLINIC | Age: 60
End: 2025-05-22
Payer: MEDICARE

## 2025-05-22 DIAGNOSIS — C22.0 HCC (HEPATOCELLULAR CARCINOMA) (H): ICD-10-CM

## 2025-05-22 DIAGNOSIS — Z79.899 ENCOUNTER FOR LONG-TERM (CURRENT) USE OF MEDICATIONS: ICD-10-CM

## 2025-05-22 DIAGNOSIS — Z94.4 LIVER TRANSPLANT RECIPIENT (H): ICD-10-CM

## 2025-05-22 DIAGNOSIS — C78.6 MALIGNANT NEOPLASM METASTATIC TO PERITONEUM (H): ICD-10-CM

## 2025-05-22 DIAGNOSIS — N18.32 STAGE 3B CHRONIC KIDNEY DISEASE (H): ICD-10-CM

## 2025-05-22 LAB
AFP SERPL-MCNC: 3.7 NG/ML
ALBUMIN MFR UR ELPH: 100 MG/DL
ALBUMIN SERPL BCG-MCNC: 4.8 G/DL (ref 3.5–5.2)
ALP SERPL-CCNC: 89 U/L (ref 40–150)
ALT SERPL W P-5'-P-CCNC: 28 U/L (ref 0–70)
ANION GAP SERPL CALCULATED.3IONS-SCNC: 14 MMOL/L (ref 7–15)
AST SERPL W P-5'-P-CCNC: 26 U/L (ref 0–45)
BASOPHILS # BLD AUTO: 0 10E3/UL (ref 0–0.2)
BASOPHILS NFR BLD AUTO: 0 %
BILIRUB SERPL-MCNC: 0.4 MG/DL
BUN SERPL-MCNC: 35.5 MG/DL (ref 8–23)
CALCIUM SERPL-MCNC: 9.8 MG/DL (ref 8.8–10.4)
CHLORIDE SERPL-SCNC: 101 MMOL/L (ref 98–107)
CREAT BLD-MCNC: 2.2 MG/DL (ref 0.7–1.2)
CREAT SERPL-MCNC: 2.03 MG/DL (ref 0.67–1.17)
CREAT UR-MCNC: 80.3 MG/DL
EGFRCR SERPLBLD CKD-EPI 2021: 33 ML/MIN/1.73M2
EGFRCR SERPLBLD CKD-EPI 2021: 37 ML/MIN/1.73M2
EOSINOPHIL # BLD AUTO: 0 10E3/UL (ref 0–0.7)
EOSINOPHIL NFR BLD AUTO: 1 %
ERYTHROCYTE [DISTWIDTH] IN BLOOD BY AUTOMATED COUNT: 14.4 % (ref 10–15)
GLUCOSE SERPL-MCNC: 120 MG/DL (ref 70–99)
HCO3 SERPL-SCNC: 21 MMOL/L (ref 22–29)
HCT VFR BLD AUTO: 33.4 % (ref 40–53)
HGB BLD-MCNC: 10.3 G/DL (ref 13.3–17.7)
HOLD SPECIMEN: NORMAL
IMM GRANULOCYTES # BLD: 0 10E3/UL
IMM GRANULOCYTES NFR BLD: 1 %
LYMPHOCYTES # BLD AUTO: 0.8 10E3/UL (ref 0.8–5.3)
LYMPHOCYTES NFR BLD AUTO: 9 %
MAGNESIUM SERPL-MCNC: 1.8 MG/DL (ref 1.7–2.3)
MCH RBC QN AUTO: 30 PG (ref 26.5–33)
MCHC RBC AUTO-ENTMCNC: 30.8 G/DL (ref 31.5–36.5)
MCV RBC AUTO: 97 FL (ref 78–100)
MONOCYTES # BLD AUTO: 0.4 10E3/UL (ref 0–1.3)
MONOCYTES NFR BLD AUTO: 4 %
NEUTROPHILS # BLD AUTO: 7.5 10E3/UL (ref 1.6–8.3)
NEUTROPHILS NFR BLD AUTO: 85 %
NRBC # BLD AUTO: 0 10E3/UL
NRBC BLD AUTO-RTO: 0 /100
PLATELET # BLD AUTO: 173 10E3/UL (ref 150–450)
POTASSIUM SERPL-SCNC: 5.7 MMOL/L (ref 3.4–5.3)
PROT SERPL-MCNC: 7.7 G/DL (ref 6.4–8.3)
PROT/CREAT 24H UR: 1.25 MG/MG CR (ref 0–0.2)
RBC # BLD AUTO: 3.43 10E6/UL (ref 4.4–5.9)
SODIUM SERPL-SCNC: 136 MMOL/L (ref 135–145)
TSH SERPL DL<=0.005 MIU/L-ACNC: 2.49 UIU/ML (ref 0.3–4.2)
WBC # BLD AUTO: 8.8 10E3/UL (ref 4–11)

## 2025-05-22 PROCEDURE — 82565 ASSAY OF CREATININE: CPT | Performed by: INTERNAL MEDICINE

## 2025-05-22 PROCEDURE — 82105 ALPHA-FETOPROTEIN SERUM: CPT | Performed by: INTERNAL MEDICINE

## 2025-05-22 PROCEDURE — 82565 ASSAY OF CREATININE: CPT | Performed by: PATHOLOGY

## 2025-05-22 PROCEDURE — 84156 ASSAY OF PROTEIN URINE: CPT | Performed by: PATHOLOGY

## 2025-05-22 PROCEDURE — 84443 ASSAY THYROID STIM HORMONE: CPT | Performed by: PATHOLOGY

## 2025-05-22 PROCEDURE — 74177 CT ABD & PELVIS W/CONTRAST: CPT | Performed by: STUDENT IN AN ORGANIZED HEALTH CARE EDUCATION/TRAINING PROGRAM

## 2025-05-22 PROCEDURE — 71260 CT THORAX DX C+: CPT | Performed by: STUDENT IN AN ORGANIZED HEALTH CARE EDUCATION/TRAINING PROGRAM

## 2025-05-22 PROCEDURE — 36415 COLL VENOUS BLD VENIPUNCTURE: CPT | Performed by: PATHOLOGY

## 2025-05-22 PROCEDURE — 83735 ASSAY OF MAGNESIUM: CPT | Performed by: PATHOLOGY

## 2025-05-22 PROCEDURE — 99000 SPECIMEN HANDLING OFFICE-LAB: CPT | Performed by: PATHOLOGY

## 2025-05-22 PROCEDURE — 85025 COMPLETE CBC W/AUTO DIFF WBC: CPT | Performed by: PATHOLOGY

## 2025-05-22 RX ORDER — SOD PHOS DI, MONO/K PHOS MONO 250 MG
250 TABLET ORAL 4 TIMES DAILY
Qty: 120 TABLET | Refills: 1 | OUTPATIENT
Start: 2025-05-22

## 2025-05-22 RX ORDER — IOPAMIDOL 755 MG/ML
86 INJECTION, SOLUTION INTRAVASCULAR ONCE
Status: COMPLETED | OUTPATIENT
Start: 2025-05-22 | End: 2025-05-22

## 2025-05-22 RX ADMIN — IOPAMIDOL 86 ML: 755 INJECTION, SOLUTION INTRAVASCULAR at 14:04

## 2025-05-26 NOTE — TELEPHONE ENCOUNTER
Anemia Management Note  SUBJECTIVE/OBJECTIVE:  Referred by Dr. Eloy Rao on 2021  Primary Diagnosis: Anemia in Chronic Kidney Disease (N18.3, D63.1)   3b  Secondary Diagnosis:  Chronic Kidney Disease, Stage 3 (N18.3)  3b  Liver Tx: 2019  Hgb goal range:  9-10  Epo/Darbo: Aranesp 100mcg every 14 days for Hgb <10.  In Clinic.  *dose increased to 100mcg starting 22  *dose increased to 60mcg starting with  21 dose  Iron regimen:  NA.  Iron levels stable  Labs : 2022  Recent IVELISSE use, transfusion, IV iron: Aranesp   RX/TX plans :  6/10/2022     No history of stroke, MI and blood clots.  History of hepatocellular carcinoma - s/p TACE 2019 and liver transplant 2019.     Contact:            Ok to leave message regarding scheduling, medical, and billing per consent to communicate dated 10/2/19                              OK to speak with Davi Saunders (friend/POA) regarding scheduling, medical, and billing per consent to communicate dated 10/2/19    Anemia Latest Ref Rng & Units 10/5/2021 10/19/2021 2021 2021 2021 2021 2021   IVELISSE Dose - - - - 60 mcg - 60 mcg 60 mcg   Hemoglobin 13.3 - 17.7 g/dL 10.1(L) 11.6(L) 11.3(L) 9.2(L) 9.1(L) 9.3(L) 8.0(L)   TSAT 15 - 46 % - - 45 44 - - -   Ferritin 26 - 388 ng/mL - - 479(H) 400(H) - - -   PRBCs - - - - - - - -     BP Readings from Last 3 Encounters:   21 104/65   21 92/56   21 117/86     Wt Readings from Last 2 Encounters:   21 176 lb 9.6 oz (80.1 kg)   10/04/21 171 lb (77.6 kg)           ASSESSMENT:  Hgb:Not at goal/Known  TSat: at goal >30% Ferritin: At goal (>100ng/mL)    PLAN:  Dose with aranesp and RTC for hgb then aranesp if needed in 2 week(s). Increased Aranesp dose to 100mcg starting 22    Orders needed to be renewed (for next follow-up date) in EPIC: None    Iron labs due:  2022    Plan discussed with:  Chasity at clinic  Plan provided by:  adri    NEXT FOLLOW-UP DATE:   1/5/21    Jie Blum RN   [facility]  [address] [address]   [email or web address]   Office : [phone]  Fax: [phone]           CEBALLOS resolved during seated rest breaks.

## 2025-05-27 ENCOUNTER — VIRTUAL VISIT (OUTPATIENT)
Dept: ENDOCRINOLOGY | Facility: CLINIC | Age: 60
End: 2025-05-27
Payer: MEDICARE

## 2025-05-27 ENCOUNTER — RESULTS FOLLOW-UP (OUTPATIENT)
Dept: TRANSPLANT | Facility: CLINIC | Age: 60
End: 2025-05-27

## 2025-05-27 DIAGNOSIS — E16.2 HYPOGLYCEMIA: ICD-10-CM

## 2025-05-27 DIAGNOSIS — E11.3293 TYPE 2 DIABETES MELLITUS WITH MILD NONPROLIFERATIVE RETINOPATHY OF BOTH EYES WITHOUT MACULAR EDEMA, UNSPECIFIED WHETHER LONG TERM INSULIN USE (H): ICD-10-CM

## 2025-05-27 DIAGNOSIS — Z79.4 TYPE 2 DIABETES MELLITUS WITH STAGE 3B CHRONIC KIDNEY DISEASE, WITH LONG-TERM CURRENT USE OF INSULIN (H): Primary | ICD-10-CM

## 2025-05-27 DIAGNOSIS — N18.32 TYPE 2 DIABETES MELLITUS WITH STAGE 3B CHRONIC KIDNEY DISEASE, WITH LONG-TERM CURRENT USE OF INSULIN (H): Primary | ICD-10-CM

## 2025-05-27 DIAGNOSIS — E11.22 TYPE 2 DIABETES MELLITUS WITH STAGE 3B CHRONIC KIDNEY DISEASE, WITH LONG-TERM CURRENT USE OF INSULIN (H): Primary | ICD-10-CM

## 2025-05-27 RX ORDER — METOPROLOL SUCCINATE 25 MG/1
25 TABLET, EXTENDED RELEASE ORAL DAILY
Qty: 30 TABLET | Refills: 3 | Status: SHIPPED | OUTPATIENT
Start: 2025-05-27

## 2025-05-27 NOTE — PROGRESS NOTES
HPI:   Frandy Workman is a very pleasant 59 year old man here for follow up of type 2 diabetes complicated by nephropathy, retinopathy and neuropathy. 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     He last saw Frannie Vasquez in clinic in November 2024 and unfortunately had recently been diagnosed with peritoneal cancer.  He was having hypoglycemia and advised to decrease his Tresiba to 14 units daily.       5/23/2025     4:37 PM   including   1. Since your last visit to our clinic (or if this your first visit, since you last saw your primary care provider), have you experienced any of the following symptoms that may be related to low blood sugars? Weakness    Lightheadedness   2. Since your last visit to our clinic (or if this your first visit, since you last saw your primary care provider), have you experienced any of the following symptoms that may be related to prolonged high blood sugars? Fatigue   4. Do you have any female family members who have had heart attacks or strokes before age 60 or male relatives who have had heart attacks or strokes before age 50? Yes   5. Do you have any family members who have had complications from diabetes such as kidney disease, heart disease or strokes, retinopathy (a vision problem), or amputations? Yes   6. Who do you live with?  Self   Little interest or pleasure in doing things? Not at all   Feeling down, depressed, or hopeless? Not at all          Today:  Miller reports still battling cancer.  It has been growing and they switched medication.    His weight had dropped earlier in the year but has picked back up.   He reports that after dinner his BG is dropping into 50 s and 60 s and has been backing off a bit.    He really likes Evaneos 3 plus and having data everyday of the month.  Nephrology has advised  Magnesium twice daily due to loose stools.    He continues Empagliflozin.      DM Regimen:  - Tresiba 15 units /day   - Novolog- 10-12 units each time.   - Correction Novolo unit Novolo mg /dl >180   - Empagliflozin 10 mg daily (had JERONIMO/hypovolemias on higher dose)    Previous treatments:   - Metformin- started to get diarrhea from it. 500 mg daily. NOT TAKING- this was stopped since last hospitalization.    CGMS data:  Benj[place] data reveals an average blood sugar 138 which correlates to an estimated GMI of 6.6%  Blood sugars 84% time in range with 2% low 0% very low  Lows are generally occurring following meals.  In the afternoon or early evening.  Please see details below              Diabetes Control:   Lab Results   Component Value Date    A1C 6.0 01/10/2022    A1C 6.8 2021    A1C 6.0 2020    A1C 6.3 2020    A1C 6.6 2018    A1C 6.5 2017    A1C 7.8 10/25/2016       Past Medical History:   Diagnosis Date    Anemia     Arthritis     Bipolar disorder (H)     Noted by Dr when dealing with depression    Bleeding disorder     Take medicine daily complications from cancer    BPH (benign prostatic hyperplasia)     CAD (coronary artery disease) 2019    Cholelithiasis     Conductive hearing loss 2017    Depressive disorder 1986    Suffer effects throughout life    Diabetes mellitus, type 2 (H) 2002    Insulin Dependent since     Gastroesophageal reflux disease 2014    HCC (hepatocellular carcinoma) (H) 2019    History of blood transfusion     Had blood transfussion until Dec 2022    History of diabetic retinopathy 2018    HTN (hypertension) 2019    Hyperlipidemia     Liver cirrhosis secondary to ESTRADA (H)     Liver failure (H) 2014    On thre transplant list since . - Off list     Liver transplanted (H) 2019    Major depression 1988    Over worked and disabled for a month    Palpitations     Getting some  as walking increases avg 7,600 per datr    Portal vein thrombosis 04/11/2019    Renal failure 12/1/2014    Was in comma from liver disease    SCC (squamous cell carcinoma) 02/15/2023    02/15/23:  Left temporal scalp    Shortness of breath 1/24/19    Had problems after liver albation    Type II diabetes mellitus (H)        Past Surgical History:   Procedure Laterality Date    ABDOMEN SURGERY  11/11/2019    Had Liver Transplant    BIOPSY  12/2022    Had biopsy done will have additional procedure 2/15/2023    BYPASS GRAFT ARTERY CORONARY N/A 07/14/2021    Procedure: median sternotomy, on cardiopulmonary bypass, CORONARY ARTERY BYPASS GRAFT (CABG) x2 with left greater saphenous vein endoscopic harvest and left internal mammery artery harvest;  Surgeon: Tom Zapata MD;  [facility]    CARDIAC SURGERY  7/2021    Heart Graph. and bypass surgery    CHOLECYSTECTOMY  11/11/2022    Removed with Liver Transplant    COLONOSCOPY      2015    COLONOSCOPY N/A 12/06/2019    Procedure: COLONOSCOPY, WITH POLYPECTOMY AND BIOPSY;  Surgeon: Adam Morton MD;  [facility]    CV CENTRAL VENOUS CATHETER PLACEMENT N/A 07/12/2021    Procedure: Central Venous Catheter Placement;  Surgeon: Fermin Polanco MD;  Location:  HEART CARDIAC CATH LAB    CV CORONARY ANGIOGRAM N/A 07/12/2021    Procedure: Coronary Angiogram;  Surgeon: Fermin Polanco MD;  Location:  HEART CARD[facility]AC CATH LAB    CV HEART CATHETERIZATION WITH POSSIBLE INTERVENTION N/A 02/26/2019    Procedure: CORS;  Surgeon: Jagdish Hoyt MD;  Location:  HEART CARDIAC CATH LAB    CV INTRA AORTIC BALLOON N/A 07/12/2021    Procedure: Intra Aortic Balloon Pump Insertion;  Surgeon: Fermin Polanco MD;  Location: [facility] CARDIAC CATH LAB    CYSTOSCOPY      ESOPHAGOSCOPY, GASTROSCOPY, DUODENOSCOPY (EGD), COMBINED N/A 11/17/2016    Procedure: COMBINED ESOPHAGOSCOPY, GASTROSCOPY, DUODENOSCOPY (EGD);  Surgeon: Vin  Santi Hanna MD;  [facility]    ESOPHAGOSCOPY, GASTROSCOPY, DUODENOSCOPY (EGD), COMBINED N/A 11/17/2017    Procedure: COMBINED ESOPHAGOSCOPY, GASTROSCOPY, DUODENOSCOPY (EGD);  EGD;  Surgeon: Santi Rosas MD;  [facility]    ESOPHAGOSCOPY, GASTROSCOPY, DUODENOSCOPY (EGD), COMBINED N/A 12/28/2018    Procedure: EGD;  Surgeon: Santi Rosas MD;  [facility]    ESOPHAGOSCOPY, GASTROSCOPY, DUODENOSCOPY (EGD), COMBINED N/A 12/06/2019    Procedure: ESOPHAGOGASTRODUODENOSCOPY, WITH BIOPSY;  Surgeon: Adam Morton MD;  [facility]: [facility]    ESOPHAGOSCOPY, GASTROSCOPY, DUODENOSCOPY (EGD), COMBINED N/A 02/13/2020    Procedure: ESOPHAGOGASTRODUODENOSCOPY (EGD);  Surgeon: Santi Rosas MD;  Location:  GI    EXCISE MASS ABDOMEN N/A 07/13/2023    Procedure: Laparoscopic lysis of adhesions, laparoscopic resection of abdominal mass;  Surgeon: Ruiz Chu MD;  Location:  OR    HEAD & NECK SURGERY      12/2017 at [facility].     IMPLANT GOLD WEIGHT EYELID Right 11/16/2017    Procedure: IMPLANT WEIGHT EYELID;  Right Upper Eyelid Weight, right tarsal strip lower eyelid;  Surgeon: Milana Malave MD;  Location:  OR    IR CHEMO EMBOLIZATION  01/22/2019    KNEE SURGERY Left     ORTHOPEDIC SURGERY      PAROTIDECTOMY, RADICAL NECK DISSECTION Right 11/02/2017    Procedure: PAROTIDECTOMY, RADICAL NECK DISSECTION;  Right Superfacial Parotidectomy , Facial nerve repair. with [facility] facial nerve monitor.;  Surgeon: Asiya Morgan MD;  [facility]    PHACOEMULSIFICATION CLEAR CORNEA WITH STANDARD INTRAOCULAR LENS IMPLANT Right 01/24/2023    Procedure: PHACOEMULSIFICATION, COMPLEX CATARACT, WITH INTRAOCULAR LENS IMPLANT WITH TRYPAN RIGHT EYE;  Surgeon: Enriqueta Martin MD;  [facility]    PHACOEMULSIFICATION WITH STANDARD INTRAOCULAR LENS IMPLANT Left 01/03/2023    Procedure: PHACOEMULSIFICATION, COMPLEX CATARACT, WITH STANDARD INTRAOCULAR LENS IMPLANT INSERTION LEFT  EYE;  Surgeon: Enriqueta Martin MD;  Location: [facility] OR    PICC INSERTION Left 2017    4fr SL BioFlo PICC, 44cm in the L basilic vein w/ tip in the low SVC    MS CARDIAC SURG PROCEDURE UNLISTED  2021    Heart graph performed    MS STOMACH SURGERY PROCEDURE UNLISTED  19    Liver transplant    RETURN LIVER TRANSPLANT N/A 2019    Procedure: Exploratory laparotomy, hematoma evacuation, abdominal washout;  Surgeon: Александр Ramos MD;  [facility]    TRANSPLANT LIVER RECIPIENT  DONOR N/A 2019    Procedure: TRANSPLANT, LIVER, RECIPIENT,  DONOR;  Surgeon: Александр Ramos MD;  [facility]    VASCULAR SURGERY         Family History   Problem Relation Age of Onset    Skin Cancer Mother     Cancer Mother         mastectomy    Diabetes Mother          3/2016    Cerebrovascular Disease Mother         Passed away in Feb of this year, 80 years old.    Thyroid Disease Mother     Depression Mother     Breast Cancer Mother     Obesity Mother         280 pounds  from this and complications from D    Pancreatic Cancer Father 60        Currently in Remission    Prostate Cancer Father         Currently in Remission    Macular Degeneration Father     Glaucoma Father     Skin Cancer Father     Coronary Artery Disease Father         Started in his 70's  at 88 in Octobe2023    Cancer Father         Ct scan every 3 months    Heart Failure Father         Has congestive heart failure since    Melanoma Father         Sun worshiper    Thyroid Disease Sister     Depression Sister     Asthma Sister     Sleep Apnea Brother     Colorectal Cancer Maternal Grandmother     Cancer Maternal Grandmother     Substance Abuse Maternal Grandmother         Alcohol    Prostate Cancer Maternal Grandfather     Substance Abuse Maternal Grandfather         Alcohol    Colorectal Cancer Paternal Grandmother     Asthma Sister         Had since birth    Cancer Paternal Grandfather      "Hyperlipidemia Brother         Off Simivastan - Normal CL since he started exer.    Obesity Sister         5'1\" 185 pounds    Depression Brother     Liver Disease No family hx of     Anesthesia Reaction No family hx of     Deep Vein Thrombosis (DVT) No family hx of     Colon Cancer No family hx of         In remision Did not die from cancer       Social History     Socioeconomic History    Marital status:     Highest education level: Bachelor's degree (e.g., BA, AB, BS)   Tobacco Use    Smoking status: Former     Packs/day: 6.00     Years: 30.00     Pack years: 180.00     Types: Cigars, Cigarettes     Start date: 2016     Quit date: 10/25/2017     Years since quittin.0    Smokeless tobacco: Former     Types: Chew     Quit date: 10/31/2017    Tobacco comments:     1 tin per week   Substance and Sexual Activity    Alcohol use: No     Alcohol/week: 0.0 standard drinks     Comment: quit 1996    Drug use: No    Sexual activity: Not Currently     Partners: Female     Birth control/protection: Condom   Other Topics Concern    Parent/sibling w/ CABG, MI or angioplasty before 65F 55M? Yes   Social History Narrative    Prior , [place]     Social Determinants of Health     Intimate Partner Violence: Not At Risk    Fear of Current or Ex-Partner: No    Emotionally Abused: No    Physically Abused: No    Sexually Abused: No       Current Outpatient Medications   Medication Sig Dispense Refill    acetaminophen (TYLENOL) 325 MG tablet TAKE 1 TABLET BY MOUTH EVERY SIX HOURS AS NEEDED FOR MILD PAIN 100 tablet 3    albuterol (PROAIR HFA/PROVENTIL HFA/VENTOLIN HFA) 108 (90 Base) MCG/ACT inhaler Inhale 2 puffs into the lungs every 4 hours as needed for shortness of breath, wheezing or cough. 18 g 3    ammonium lactate (AMLACTIN) 12 % external cream APPLY TOPICALLY DAILY AS NEEDED FOR DRY SKIN (ON FEET) 140 g 5    benzoyl peroxide 5 % external liquid Use topically in showers as a body wash 226 g 5    blood " glucose (NO BRAND SPECIFIED) lancets standard Use to test blood sugar 1 times daily or as directed. 100 each 11    calcium carbonate (TUMS) 500 MG chewable tablet Take 1 tablet (500 mg) by mouth 2 times daily. 180 tablet 3    Continuous Glucose  (FREESTYLE CLAUDIA 3 READER) CECILIO 1 each continuously. use to read blood sugar per 's instructions 1 each 0    Continuous Glucose Sensor (FREESTYLE CLAUDIA 3 PLUS SENSOR) MISC Change every 15 days. 6 each 1    diphenoxylate-atropine (LOMOTIL) 2.5-0.025 MG tablet TAKE 2 TABLETS BY MOUTH FOUR TIMES A DAY AS NEEDED FOR DIARRHEA 60 tablet 3    ELIQUIS ANTICOAGULANT 5 MG tablet TAKE 1 TABLET BY MOUTH TWICE A DAY 60 tablet 3    furosemide (LASIX) 20 MG tablet Take 1 tablet (20 mg) by mouth as needed (for edema). 90 tablet 3    insulin degludec (TRESIBA FLEXTOUCH) 100 UNIT/ML pen INJECT 14 UNITS SUBCUTANEOUSLY ONCE DAILY 15 mL 3    insulin pen needle (BD VIKTORIA U/F) 32G X 4 MM miscellaneous USE 5 PER  each 3    JARDIANCE 10 MG TABS tablet TAKE 1 TABLET BY MOUTH DAILY 30 tablet 11    ketoconazole (NIZORAL) 2 % external shampoo APPLY A THIN LAYER TOPICALLY TO SCALP IN SHOWER, LEAVE ON 5 MIN PRIOR TO RINSE (MAY ALSO USE AS BODY WASH) 120 mL 5    lamiVUDine (EPIVIR) 100 MG tablet Take 1 tablet (100 mg) by mouth daily. 90 tablet 2    Lenvatinib 8 MG, 2 x 4 mg capsules, (LENVIMA) 2 x 4 MG capsule Take 2 capsules (8 mg) by mouth daily. 60 capsule 0    lisinopril (ZESTRIL) 10 MG tablet Take 1 tablet (10 mg) by mouth daily. 30 tablet 11    LOKELMA 10 g PACK packet DISSOLVE AND TAKE ONE PACKET BY MOUTH THREE TIMES A DAY FOR 2 DAYS, THEN DISSOLVE AND TAKE ONE PACKET BY MOUTH DAILY. 30 packet 10    loperamide (IMODIUM) 2 MG capsule Take 1 capsule (2 mg) by mouth 4 times daily as needed for diarrhea. 120 capsule 3    magnesium oxide 400 MG tablet Take 1 tablet (400 mg) by mouth 2 times daily. 180 tablet 3    metoprolol succinate ER (TOPROL XL) 25 MG 24 hr tablet TAKE 1  TABLET BY MOUTH DAILY 30 tablet 3    Multiple Vitamin (DAILY-GLADIS MULTIVITAMIN) TABS TAKE 1 TABLET BY MOUTH ONCE DAILY 30 tablet 11    mycophenolate (GENERIC EQUIVALENT) 250 MG capsule Take 2 capsules (500 mg) by mouth 2 times daily. 120 capsule 11    NIFEdipine ER OSMOTIC (PROCARDIA XL) 60 MG 24 hr tablet TAKE 1 TABLET BY MOUTH TWICE A DAY 60 tablet 3    omega 3 1000 MG CAPS Take 2,000 mg by mouth 2 times daily. 120 capsule 11    ondansetron (ZOFRAN ODT) 8 MG ODT tab Take 1 tablet (8 mg) by mouth every 8 hours as needed for nausea 15 tablet 3    pantoprazole (PROTONIX) 40 MG EC tablet TAKE 1 TABLET BY MOUTH ONCE DAILY BEFORE BREAKFAST 90 tablet 3    phosphorus tablet 250 mg 250 MG per tablet Take 1 tablet (250 mg) by mouth 4 times daily. 120 tablet 1    prednisoLONE acetate (PRED FORTE) 1 % ophthalmic suspension  (Patient not taking: No sig reported)      predniSONE (DELTASONE) 5 MG tablet Take 1 tablet (5 mg) by mouth daily. 30 tablet 11    prochlorperazine (COMPAZINE) 10 MG tablet Take 1 tablet (10 mg) by mouth every 6 hours as needed for nausea or vomiting. 30 tablet 2    rosuvastatin (CRESTOR) 20 MG tablet Take 1 tablet (20 mg) by mouth daily. 30 tablet 11    tamsulosin (FLOMAX) 0.4 MG capsule TAKE 1 CAPSULE BY MOUTH ONCE DAILY 30 capsule 8    triamcinolone (KENALOG) 0.1 % external cream Apply topically 2 times daily. 80 g 0    UltiCare Alcohol Swabs 70 % PADS 1 each by Other route 4 times daily 400 each 1    VITAMIN D3 50 MCG (2000 UT) tablet TAKE 1 TABLET BY MOUTH ONCE DAILY 30 tablet 8     Current Facility-Administered Medications   Medication Dose Route Frequency Provider Last Rate Last Admin    [facility] intravitreal injection (prefilled syringe) 6 mg  6 mg Intravitreal Q28 Days Enriqueta Martin MD        far[facility]voa ([facility] intravitreal injection (prefilled syringe) 6 mg  6 mg Intravitreal Q28 Days Enriqueta Martin MD              Allergies   Allergen Reactions     Codeine Other (See Comments)     Cannot take due to liver  Cannot tolerate oral narcotics    Seasonal Allergies      Sneezing, coughing, runny and itchy eyes       Physical Exam  There were no vitals taken for this visit.  GENERAL:  Alert and oriented X3, NAD, well dressed, answering questions appropriately, appears thin.  CV: RRR, no murmurs  LUNGS: CTAB, no wheezes, rales, or ronchi  EXTREMITIES: 1+ edema, +calluses.   NEUROLOGY: +monofilament sensation.     RESULTS  Lab Results   Component Value Date    A1C 5.6 12/04/2024    A1C 5.6 09/05/2024    A1C 5.6 05/13/2024    A1C 5.7 (H) 12/18/2023    A1C 6.3 (H) 06/14/2023    A1C 6.9 (H) 12/14/2022    A1C 6.0 (H) 12/30/2020    A1C 6.3 (H) 06/13/2020    A1C 6.6 (H) 08/08/2018    A1C 6.5 (H) 06/09/2017    A1C 7.8 (H) 10/25/2016    HEMOGLOBINA1 4.8 03/04/2020    HEMOGLOBINA1 5.1 12/02/2019    HEMOGLOBINA1 5.4 08/14/2019    HEMOGLOBINA1 6.1 (A) 02/11/2019    HEMOGLOBINA1 8.1 (A) 04/11/2018       TSH   Date Value Ref Range Status   05/22/2025 2.49 0.30 - 4.20 uIU/mL Final   05/05/2025 5.61 (H) 0.30 - 4.20 uIU/mL Final   04/01/2025 2.05 0.30 - 4.20 uIU/mL Final   09/05/2024 1.32 0.30 - 4.20 uIU/mL Final   04/25/2022 1.24 0.40 - 4.00 mU/L Final   10/04/2021 1.90 0.40 - 4.00 mU/L Final   01/28/2021 0.91 0.40 - 4.00 mU/L Final   01/24/2020 1.91 0.40 - 4.00 mU/L Final   08/08/2018 0.49 0.40 - 4.00 mU/L Final   02/08/2018 0.71 0.40 - 4.00 mU/L Final   06/09/2017 0.42 0.40 - 4.00 mU/L Final     T4 Free   Date Value Ref Range Status   08/08/2018 1.21 0.76 - 1.46 ng/dL Final     Free T4   Date Value Ref Range Status   05/05/2025 1.48 0.90 - 1.70 ng/dL Final       ALT   Date Value Ref Range Status   05/22/2025 28 0 - 70 U/L Final   05/05/2025 27 0 - 70 U/L Final   06/28/2021 20 0 - 70 U/L Final   06/14/2021 21 0 - 70 U/L Final   ]    Recent Labs   Lab Test 08/02/24  0843 06/04/24  1040   CHOL 139 180   HDL 37* 43   LDL 53 94   TRIG 245* 217*       Lab Results   Component Value Date      10/23/2024     06/28/2021      Lab Results   Component Value Date    POTASSIUM 4.5 10/23/2024    POTASSIUM 7.7 08/10/2023    POTASSIUM 4.7 07/20/2022    POTASSIUM 4.6 06/28/2021     Lab Results   Component Value Date    CHLORIDE 103 10/23/2024    CHLORIDE 105 07/20/2022    CHLORIDE 107 06/28/2021     Lab Results   Component Value Date    DEBORAH 7.5 10/23/2024    DEBORAH 9.1 06/28/2021     Lab Results   Component Value Date    CO2 23 10/23/2024    CO2 26 07/20/2022    CO2 25 06/28/2021     Lab Results   Component Value Date    BUN 24.7 10/23/2024    BUN 32 07/20/2022    BUN 33 06/28/2021     Lab Results   Component Value Date    CR 1.52 10/23/2024    CR 1.62 06/28/2021       GFR Estimate   Date Value Ref Range Status   05/22/2025 37 (L) >60 mL/min/1.73m2 Final     Comment:     eGFR calculated using 2021 CKD-EPI equation.   05/05/2025 35 (L) >60 mL/min/1.73m2 Final     Comment:     eGFR calculated using 2021 CKD-EPI equation.   04/23/2025 41 (L) >60 mL/min/1.73m2 Final     Comment:     eGFR calculated using 2021 CKD-EPI equation.   06/28/2021 46 (L) >60 mL/min/[1.73_m2] Final     Comment:     Non  GFR Calc  Starting 12/18/2018, serum creatinine based estimated GFR (eGFR) will be   calculated using the Chronic Kidney Disease Epidemiology Collaboration   (CKD-EPI) equation.     06/14/2021 49 (L) >60 mL/min/[1.73_m2] Final     Comment:     Non  GFR Calc  Starting 12/18/2018, serum creatinine based estimated GFR (eGFR) will be   calculated using the Chronic Kidney Disease Epidemiology Collaboration   (CKD-EPI) equation.     06/04/2021 46 (L) >60 mL/min/[1.73_m2] Final     Comment:     Non  GFR Calc  Starting 12/18/2018, serum creatinine based estimated GFR (eGFR) will be   calculated using the Chronic Kidney Disease Epidemiology Collaboration   (CKD-EPI) equation.       GFR, ESTIMATED POCT   Date Value Ref Range Status   05/22/2025 33 (L) >60 mL/min/1.73m2 Final  "  03/13/2025 37 (L) >60 mL/min/1.73m2 Final   12/04/2024 46 (L) >60 mL/min/1.73m2 Final     GFR Estimate If Black   Date Value Ref Range Status   06/28/2021 54 (L) >60 mL/min/[1.73_m2] Final     Comment:      GFR Calc  Starting 12/18/2018, serum creatinine based estimated GFR (eGFR) will be   calculated using the Chronic Kidney Disease Epidemiology Collaboration   (CKD-EPI) equation.     06/14/2021 57 (L) >60 mL/min/[1.73_m2] Final     Comment:      GFR Calc  Starting 12/18/2018, serum creatinine based estimated GFR (eGFR) will be   calculated using the Chronic Kidney Disease Epidemiology Collaboration   (CKD-EPI) equation.     06/04/2021 53 (L) >60 mL/min/[1.73_m2] Final     Comment:      GFR Calc  Starting 12/18/2018, serum creatinine based estimated GFR (eGFR) will be   calculated using the Chronic Kidney Disease Epidemiology Collaboration   (CKD-EPI) equation.         Lab Results   Component Value Date    MICROL 1,144.0 09/05/2024    MICROL 47 04/20/2022    MICROL 563 02/26/2021     No results found for: \"MICROALBUMIN\"  No results found for: \"CPEPT\", \"GADAB\", \"ISCAB\"    Vitamin B12   Date Value Ref Range Status   08/15/2023 491 232 - 1,245 pg/mL Final   01/11/2022 2,179 (H) 193 - 986 pg/mL Final   06/13/2020 682 193 - 986 pg/mL Final   02/04/2019 3,006 (H) 193 - 986 pg/mL Final   ]    Most recent eye exam date: : Not Found     FIB-4 Calculation: 2.18 at 10/23/2024 10:44 AM  Calculated from:  SGOT/AST: 27 U/L at 10/23/2024 10:44 AM  SGPT/ALT: 24 U/L at 10/23/2024 10:44 AM  Platelets: 152 10e3/uL at 10/23/2024 10:44 AM  Age: 59 years  A value of FIB-4 below 1.30 is considered as low risk for advanced fibrosis; a value of FIB-4 over 2.67 is considered as high risk for advanced fibrosis; and FIB-4 values between 1.30 and 2.67 are considered as intermediate risk of advanced fibrosis.    Assessment/Plan:     1.  Type 2 diabetes- Doing well with glucose control (A1c " 5.6%), but he has been having low blood sugars after meals.       You are doing a great job and your blood sugar is very well-managed but I am worried about the low blood sugars after meals.    Please take just 8-10 units with meals to avoid low blood sugar after meals.  If needed you can decrease this further.     Continue Tresiba 14 units daily, but decrease meal time Novolog to 8 - 10 units to avoid low blood sugar after meals.   If you need to fast for a test or procedure please take just 10 units of Tresiba that day.    Target glucose is .     Take Novolog before meals and bedtime, but with caution to avoid low blood sugars.     Please see the diabetes nurse to review your blood sugars and make a plan to address any further low blood sugar <70.  Let us know if need to be seen sooner for BG<70.      If you have concerns, please send me a [employer] message M-Th, call the clinic at [phone], or call [phone] after hours/weekends and ask to speak with the endocrinologist on call.      2.  Risk factors-     Retinopathy:  Yes.  Had eye exam within last year.   Nephropathy:  BP historically well controlled.+ albuminuria.  Creatinine stable. GFR has been dropping - he will now see nephrology every 6 months.    Neuropathy: Yes.   Feet: OK, no ulcers. +neuropathy Sees podiatry today. Wears diabetic shoe.   Lipids:  TG were high at last check. Goal LDL <70. Patient taking rosuvastatin     3.  F/U in 3-4 weeks with DM education review hypoglycemia.  Self or Vasquez 6 months.      26 minutes spent on the date of the encounter doing chart review, review of test results, review and interpretation of glucose data, patient visit and documentation, counseling/coordination of care, and discussion of follow up plan for worsening hyper and hypoglycemia.  The patient understood and is satisfied with today's visit.     It is my privilege to be involved in the care of the above patient.     Tish Toscano PA-C,  MPAS  Diabetes, Endocrinology, and Metabolism  [phone] Appointments/Nurse Line  [phone] Fax  [phone] office  [email or web address]                         Virtual Visit Details    Type of service:  Video Visit   12:04 - 12:24    Originating Location (pt. Location): Home    Distant Location (provider location):  On-site  Platform used for Video Visit: Odette      Outcome for 05/27/25 6:44 AM: Data obtained via [employer] website  Yojana Wang MA    Patient is showing 5/5 MNCM met.   Yojana Wang MA

## 2025-05-27 NOTE — LETTER
5/27/2025       RE: Frandy Workman  [address] [facility] [address]     Dear Colleague,    Thank you for referring your patient, Frandy Workman, to the [facility] ENDOCRINOLOG[facility] [place] at [facility]. Please see a copy of my visit note below.      HPI:   Frandy Workman is a very pleasant 59 year old man here for follow up of type 2 diabetes complicated by nephropathy, retinopathy and neuropathy. 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     He last saw Frannie Vasquez in clinic in November 2024 and unfortunately had recently been diagnosed with peritoneal cancer.  He was having hypoglycemia and advised to decrease his Tresiba to 14 units daily.       5/23/2025     4:37 PM   including   1. Since your last visit to our clinic (or if this your first visit, since you last saw your primary care provider), have you experienced any of the following symptoms that may be related to low blood sugars? Weakness    Lightheadedness   2. Since your last visit to our clinic (or if this your first visit, since you last saw your primary care provider), have you experienced any of the following symptoms that may be related to prolonged high blood sugars? Fatigue   4. Do you have any female family members who have had heart attacks or strokes before age 60 or male relatives who have had heart attacks or strokes before age 50? Yes   5. Do you have any family members who have had complications from diabetes such as kidney disease, heart disease or strokes, retinopathy (a vision problem), or amputations? Yes   6. Who do you live with?  Self   Little interest or pleasure in doing things? Not at all   Feeling down, depressed, or hopeless? Not at all          Today:  Miller reports still battling cancer.  It  has been growing and they switched medication.    His weight had dropped earlier in the year but has picked back up.   He reports that after dinner his BG is dropping into 50 s and 60 s and has been backing off a bit.    He really likes th e Benjamin 3 plus and having data everyday of the month.  Nephrology has advised Magnesium twice daily due to loose stools.    He continues Empagliflozin.      DM Regimen:  - Tresiba 15 units /day   - Novolog- 10-12 units each time.   - Correction Novolo unit Novolo mg /dl >180   - Empagliflozin 10 mg daily (had JERONIMO/hypovolemias on higher dose)    Previous treatments:   - Metformin- started to get diarrhea from it. 500 mg daily. NOT TAKING- this was stopped since last hospitalization.    CGMS data:  Benjamin data reveals an average blood sugar 138 which correlates to an estimated GMI of 6.6%  Blood sugars 84% time in range with 2% low 0% very low  Lows are generally occurring following meals.  In the afternoon or early evening.  Please see details below              Diabetes Control:   Lab Results   Component Value Date    A1C 6.0 01/10/2022    A1C 6.8 2021    A1C 6.0 2020    A1C 6.3 2020    A1C 6.6 2018    A1C 6.5 2017    A1C 7.8 10/25/2016       Past Medical History:   Diagnosis Date     Anemia      Arthritis      Bipolar disorder (H)     Noted by Dr when dealing with depression     Bleeding disorder     Take medicine daily complications from cancer     BPH (benign prostatic hyperplasia)      CAD (coronary artery disease) 2019     Cholelithiasis      Conductive hearing loss 2017     Depressive disorder 1986    Suffer effects throughout life     Diabetes mellitus, type 2 (H) 2002    Insulin Dependent since      Gastroesophageal reflux disease 2014     HCC (hepatocellular carcinoma) (H) 2019     History of blood transfusion 2019    Had blood transfussion until Dec 2022     History of diabetic retinopathy  07/2018     HTN (hypertension) 11/20/2019     Hyperlipidemia      Liver cirrhosis secondary to ESTRADA (H)      Liver failure (H) 12/1/2014    On thre transplant list since 5.2013 - Off list July 16     Liver transplanted (H) 11/11/2019     Major depression 1988    Over worked and disabled for a month     Palpitations 2010    Getting some as walking increases avg 7,600 per datr     Portal vein thrombosis 04/11/2019     Renal failure 12/1/2014    Was in comma from liver disease     SCC (squamous cell carcinoma) 02/15/2023    02/15/23:  Left temporal scalp     Shortness of breath 1/24/19    Had problems after liver albation     Type II diabetes mellitus (H)        Past Surgical History:   Procedure Laterality Date     ABDOMEN SURGERY  11/11/2019    Had Liver Transplant     BIOPSY  12/2022    Had biopsy done will have additional procedure 2/15/2023     BYPASS GRAFT ARTERY CORONARY N/A 07/14/2021    Procedure: median sternotomy, on cardiopulmonary bypass, CORONARY ARTERY BYPASS GRAFT (CABG) x2 with left greater saphenous vein endoscopic harvest and left internal mammery artery harvest;  Surgeon: Tom Zapata MD;  Location:  OR     CARDIAC SURGERY  7/2021    Heart Graph. and bypass surgery     CHOLECYSTECTOMY  11/11/2022    Removed with Liver Transplant     COLONOSCOPY      2015     COLONOSCOPY N/A 12/06/2019    Procedure: COLONOSCOPY, WITH POLYPECTOMY AND BIOPSY;  Surgeon: Adam Morton MD;  Location:  GI     CV CENTRAL VENOUS CATHETER PLACEMENT N/A 07/12/2021    Procedure: Central Venous Catheter Placement;  Surgeon: Fermin Polanco MD;  Location: [facility] CARDIAC CATH LAB     CV CORONARY ANGIOGRAM N/A 07/12/2021    Procedure: Coronary Angiogram;  Surgeon: Fermin Polanco MD;  Location: [facility] CARDIAC CATH LAB     CV HEART CATHETERIZATION WITH POSSIBLE INTERVENTION N/A 02/26/2019    Procedure: CORS;  Surgeon: Jagdish Hoyt MD;  Location: [facility] CARDIAC CATH LAB      CV INTRA AORTIC BALLOON N/A 07/12/2021    Procedure: Intra Aortic Balloon Pump Insertion;  Surgeon: Fermin Polanco MD;  Location:  HEART CARDIAC CATH LAB     CYSTOSCOPY       ESOPHAGOSCOPY, GASTROSCOPY, DUODENOSCOPY (EGD), COMBINED N/A 11/17/2016    Procedure: COMBINED ESOPHAGOSCOPY, GASTROSCOPY, DUODENOSCOPY (EGD);  Surgeon: Santi Rosas MD;  Location:  GI     ESOPHAGOSCOPY, GASTROSCOPY, DUODENOSCOPY (EGD), COMBINED N/A 11/17/2017    Procedure: COMBINED ESOPHAGOSCOPY, GASTROSCOPY, DUODENOSCOPY (EGD);  EGD;  Surgeon: Santi Rosas MD;  Location:  GI     ESOPHAGOSCOPY, GASTROSCOPY, DUODENOSCOPY (EGD), COMBINED N/A 12/28/2018    Procedure: EGD;  Surgeon: Santi Rosas MD;  Location:  OR     ESOPHAGOSCOPY, GASTROSCOPY, DUODENOSCOPY (EGD), COMBINED N/A 12/06/2019    Procedure: ESOPHAGOGASTRODUODENOSCOPY, WITH BIOPSY;  Surgeon: Adam Morton MD;  Location:  GI     ESOPHAGOSCOPY, GASTROSCOPY, DUODENOSCOPY (EGD), COMBINED N/A 02/13/2020    Procedure: ESOPHAGOGASTRODUODENOSCOPY (EGD);  Surgeon: Santi Rosas MD;  Location:  GI     EXCISE MASS ABDOMEN N/A 07/13/2023    Procedure: Laparoscopic lysis of adhesions, laparoscopic resection of abdominal mass;  Surgeon: Ruiz Chu MD;  Location:  OR     HEAD & NECK SURGERY      12/2017 at [facility].      IMPLANT GOLD WEIGHT EYELID Right 11/16/2017    Procedure: IMPLANT WEIGHT EYELID;  Right Upper Eyelid Weight, right tarsal strip lower eyelid;  Surgeon: Milana Malave MD;  Location:  OR     IR CHEMO EMBOLIZATION  01/22/2019     KNEE SURGERY Left      ORTHOPEDIC SURGERY       PAROTIDECTOMY, RADICAL NECK DISSECTION Right 11/02/2017    Procedure: PAROTIDECTOMY, RADICAL NECK DISSECTION;  Right Superfacial Parotidectomy , Facial nerve repair. with [facility] facial nerve monitor.;  Surgeon: Asiya Morgan MD;  Location:  OR     PHACOEMULSIFICATION CLEAR CORNEA WITH STANDARD INTRAOCULAR LENS IMPLANT  Right 2023    Procedure: PHACOEMULSIFICATION, COMPLEX CATARACT, WITH INTRAOCULAR LENS IMPLANT WITH TRYPAN RIGHT EYE;  Surgeon: Enriqueta Martin MD;  Location: [facility] OR     PHACOEMULSIFICATION WITH STANDARD INTRAOCULAR LENS IMPLANT Left 2023    Procedure: PHACOEMULSIFICATION, COMPLEX CATARACT, WITH STANDARD INTRAOCULAR LENS IMPLANT INSERTION LEFT EYE;  Surgeon: Enriqueta Martin MD;  Location: [facility] OR     PICC INSERTION Left 2017    4fr SL BioFlo PICC, 44cm in the L basilic vein w/ tip in the low SVC     [facility] CARDIAC SURG PROCEDURE UNLISTED  2021    Heart graph performed     [facility] STOMACH SURGERY PROCEDURE UNLISTED  19    Liver transplant     RETURN LIVER TRANSPLANT N/A 2019    Procedure: Exploratory laparotomy, hematoma evacuation, abdominal washout;  Surgeon: Александр Ramos MD;  Location: [facility] OR     TRANSPLANT LIVER RECIPIENT  DONOR N/A 2019    Procedure: TRANSPLANT, LIVER, RECIPIENT,  DONOR;  Surgeon: Александр Ramos MD;  Location: [facility] OR     VASCULAR SURGERY         Family History   Problem Relation Age of Onset     Skin Cancer Mother      Cancer Mother         mastectomy     Diabetes Mother          3/2016     Cerebrovascular Disease Mother         Passed away in Feb of this year, 80 years old.     Thyroid Disease Mother      Depression Mother      Breast Cancer Mother      Obesity Mother         280 pounds  from this and complications from D     Pancreatic Cancer Father 60        Currently in Remission     Prostate Cancer Father         Currently in Remission     Macular Degeneration Father      Glaucoma Father      Skin Cancer Father      Coronary Artery Disease Father         Started in his 70's  at 88 in Octobe2023     Cancer Father         Ct scan every 3 months     Heart Failure Father         Has congestive heart failure since     Melanoma Father         Sun worshiper     Thyroid Disease Sister      Depression Sister       "Asthma Sister      Sleep Apnea Brother      Colorectal Cancer Maternal Grandmother      Cancer Maternal Grandmother      Substance Abuse Maternal Grandmother         Alcohol     Prostate Cancer Maternal Grandfather      Substance Abuse Maternal Grandfather         Alcohol     Colorectal Cancer Paternal Grandmother      Asthma Sister         Had since birth     Cancer Paternal Grandfather      Hyperlipidemia Brother         Off Simivastan - Normal CL since he started exer.     Obesity Sister         5'1\" 185 pounds     Depression Brother      Liver Disease No family hx of      Anesthesia Reaction No family hx of      Deep Vein Thrombosis (DVT) No family hx of      Colon Cancer No family hx of         In remision Did not die from cancer       Social History     Socioeconomic History     Marital status:      Highest education level: Bachelor's degree (e.g., BA, AB, BS)   Tobacco Use     Smoking status: Former     Packs/day: 6.00     Years: 30.00     Pack years: 180.00     Types: Cigars, Cigarettes     Start date: 2016     Quit date: 10/25/2017     Years since quittin.0     Smokeless tobacco: Former     Types: Chew     Quit date: 10/31/2017     Tobacco comments:     1 tin per week   Substance and Sexual Activity     Alcohol use: No     Alcohol/week: 0.0 standard drinks     Comment: quit 1996     Drug use: No     Sexual activity: Not Currently     Partners: Female     Birth control/protection: Condom   Other Topics Concern     Parent/sibling w/ CABG, MI or angioplasty before 65F 55M? Yes   Social History Narrative    Prior [facility]     Social Determinants of Health     Intimate Partner Violence: Not At Risk     Fear of Current or Ex-Partner: No     Emotionally Abused: No     Physically Abused: No     Sexually Abused: No       Current Outpatient Medications   Medication Sig Dispense Refill     acetaminophen (TYLENOL) 325 MG tablet TAKE 1 TABLET BY MOUTH EVERY SIX HOURS AS NEEDED FOR MILD " PAIN 100 tablet 3     albuterol (PROAIR HFA/PROVENTIL HFA/VENTOLIN HFA) 108 (90 Base) MCG/ACT inhaler Inhale 2 puffs into the lungs every 4 hours as needed for shortness of breath, wheezing or cough. 18 g 3     ammonium lactate (AMLACTIN) 12 % external cream APPLY TOPICALLY DAILY AS NEEDED FOR DRY SKIN (ON FEET) 140 g 5     benzoyl peroxide 5 % external liquid Use topically in showers as a body wash 226 g 5     blood glucose (NO BRAND SPECIFIED) lancets standard Use to test blood sugar 1 times daily or as directed. 100 each 11     calcium carbonate (TUMS) 500 MG chewable tablet Take 1 tablet (500 mg) by mouth 2 times daily. 180 tablet 3     Continuous Glucose  (FREESTYLE CLAUDIA 3 READER) CECILIO 1 each continuously. use to read blood sugar per 's instructions 1 each 0     Continuous Glucose Sensor (FREESTYLE CLAUDIA 3 PLUS SENSOR) MISC Change every 15 days. 6 each 1     diphenoxylate-atropine (LOMOTIL) 2.5-0.025 MG tablet TAKE 2 TABLETS BY MOUTH FOUR TIMES A DAY AS NEEDED FOR DIARRHEA 60 tablet 3     ELIQUIS ANTICOAGULANT 5 MG tablet TAKE 1 TABLET BY MOUTH TWICE A DAY 60 tablet 3     furosemide (LASIX) 20 MG tablet Take 1 tablet (20 mg) by mouth as needed (for edema). 90 tablet 3     insulin degludec (TRESIBA FLEXTOUCH) 100 UNIT/ML pen INJECT 14 UNITS SUBCUTANEOUSLY ONCE DAILY 15 mL 3     insulin pen needle (BD VIKTORIA U/F) 32G X 4 MM miscellaneous USE 5 PER  each 3     JARDIANCE 10 MG TABS tablet TAKE 1 TABLET BY MOUTH DAILY 30 tablet 11     ketoconazole (NIZORAL) 2 % external shampoo APPLY A THIN LAYER TOPICALLY TO SCALP IN SHOWER, LEAVE ON 5 MIN PRIOR TO RINSE (MAY ALSO USE AS BODY WASH) 120 mL 5     lamiVUDine (EPIVIR) 100 MG tablet Take 1 tablet (100 mg) by mouth daily. 90 tablet 2     Lenvatinib 8 MG, 2 x 4 mg capsules, (LENVIMA) 2 x 4 MG capsule Take 2 capsules (8 mg) by mouth daily. 60 capsule 0     lisinopril (ZESTRIL) 10 MG tablet Take 1 tablet (10 mg) by mouth daily. 30 tablet 11      LOKELMA 10 g PACK packet DISSOLVE AND TAKE ONE PACKET BY MOUTH THREE TIMES A DAY FOR 2 DAYS, THEN DISSOLVE AND TAKE ONE PACKET BY MOUTH DAILY. 30 packet 10     loperamide (IMODIUM) 2 MG capsule Take 1 capsule (2 mg) by mouth 4 times daily as needed for diarrhea. 120 capsule 3     magnesium oxide 400 MG tablet Take 1 tablet (400 mg) by mouth 2 times daily. 180 tablet 3     metoprolol succinate ER (TOPROL XL) 25 MG 24 hr tablet TAKE 1 TABLET BY MOUTH DAILY 30 tablet 3     Multiple Vitamin (DAILY-GLADIS MULTIVITAMIN) TABS TAKE 1 TABLET BY MOUTH ONCE DAILY 30 tablet 11     mycophenolate (GENERIC EQUIVALENT) 250 MG capsule Take 2 capsules (500 mg) by mouth 2 times daily. 120 capsule 11     NIFEdipine ER OSMOTIC (PROCARDIA XL) 60 MG 24 hr tablet TAKE 1 TABLET BY MOUTH TWICE A DAY 60 tablet 3     omega 3 1000 MG CAPS Take 2,000 mg by mouth 2 times daily. 120 capsule 11     ondansetron (ZOFRAN ODT) 8 MG ODT tab Take 1 tablet (8 mg) by mouth every 8 hours as needed for nausea 15 tablet 3     pantoprazole (PROTONIX) 40 MG EC tablet TAKE 1 TABLET BY MOUTH ONCE DAILY BEFORE BREAKFAST 90 tablet 3     phosphorus tablet 250 mg 250 MG per tablet Take 1 tablet (250 mg) by mouth 4 times daily. 120 tablet 1     prednisoLONE acetate (PRED FORTE) 1 % ophthalmic suspension  (Patient not taking: No sig reported)       predniSONE (DELTASONE) 5 MG tablet Take 1 tablet (5 mg) by mouth daily. 30 tablet 11     prochlorperazine (COMPAZINE) 10 MG tablet Take 1 tablet (10 mg) by mouth every 6 hours as needed for nausea or vomiting. 30 tablet 2     rosuvastatin (CRESTOR) 20 MG tablet Take 1 tablet (20 mg) by mouth daily. 30 tablet 11     tamsulosin (FLOMAX) 0.4 MG capsule TAKE 1 CAPSULE BY MOUTH ONCE DAILY 30 capsule 8     triamcinolone (KENALOG) 0.1 % external cream Apply topically 2 times daily. 80 g 0     UltiCare Alcohol Swabs 70 % PADS 1 each by Other route 4 times daily 400 each 1     VITAMIN D3 50 MCG (2000 UT) tablet TAKE 1 TABLET BY MOUTH  ONCE DAILY 30 tablet 8     Current Facility-Administered Medications   Medication Dose Route Frequency Provider Last Rate Last Admin     faricimab-svoa ([facility]) intravitreal injection (prefilled syringe) 6 mg  6 mg Intravitreal Q28 Days Enriqueta Martin MD         [facility]a [facility] intravitreal injection (prefilled syringe) 6 mg  6 mg Intravitreal Q28 Days Enriqueta Martin MD              Allergies   Allergen Reactions     Codeine Other (See Comments)     Cannot take due to liver  Cannot tolerate oral narcotics     Seasonal Allergies      Sneezing, coughing, runny and itchy eyes       Physical Exam  There were no vitals taken for this visit.  GENERAL:  Alert and oriented X3, NAD, well dressed, answering questions appropriately, appears thin.  CV: RRR, no murmurs  LUNGS: CTAB, no wheezes, rales, or ronchi  EXTREMITIES: 1+ edema, +calluses.   NEUROLOGY: +monofilament sensation.     RESULTS  Lab Results   Component Value Date    A1C 5.6 12/04/2024    A1C 5.6 09/05/2024    A1C 5.6 05/13/2024    A1C 5.7 (H) 12/18/2023    A1C 6.3 (H) 06/14/2023    A1C 6.9 (H) 12/14/2022    A1C 6.0 (H) 12/30/2020    A1C 6.3 (H) 06/13/2020    A1C 6.6 (H) 08/08/2018    A1C 6.5 (H) 06/09/2017    A1C 7.8 (H) 10/25/2016    HEMOGLOBINA1 4.8 03/04/2020    HEMOGLOBINA1 5.1 12/02/2019    HEMOGLOBINA1 5.4 08/14/2019    HEMOGLOBINA1 6.1 (A) 02/11/2019    HEMOGLOBINA1 8.1 (A) 04/11/2018       TSH   Date Value Ref Range Status   05/22/2025 2.49 0.30 - 4.20 uIU/mL Final   05/05/2025 5.61 (H) 0.30 - 4.20 uIU/mL Final   04/01/2025 2.05 0.30 - 4.20 uIU/mL Final   09/05/2024 1.32 0.30 - 4.20 uIU/mL Final   04/25/2022 1.24 0.40 - 4.00 mU/L Final   10/04/2021 1.90 0.40 - 4.00 mU/L Final   01/28/2021 0.91 0.40 - 4.00 mU/L Final   01/24/2020 1.91 0.40 - 4.00 mU/L Final   08/08/2018 0.49 0.40 - 4.00 mU/L Final   02/08/2018 0.71 0.40 - 4.00 mU/L Final   06/09/2017 0.42 0.40 - 4.00 mU/L Final     T4 Free   Date Value Ref Range Status    08/08/2018 1.21 0.76 - 1.46 ng/dL Final     Free T4   Date Value Ref Range Status   05/05/2025 1.48 0.90 - 1.70 ng/dL Final       ALT   Date Value Ref Range Status   05/22/2025 28 0 - 70 U/L Final   05/05/2025 27 0 - 70 U/L Final   06/28/2021 20 0 - 70 U/L Final   06/14/2021 21 0 - 70 U/L Final   ]    Recent Labs   Lab Test 08/02/24  0843 06/04/24  1040   CHOL 139 180   HDL 37* 43   LDL 53 94   TRIG 245* 217*       Lab Results   Component Value Date     10/23/2024     06/28/2021      Lab Results   Component Value Date    POTASSIUM 4.5 10/23/2024    POTASSIUM 7.7 08/10/2023    POTASSIUM 4.7 07/20/2022    POTASSIUM 4.6 06/28/2021     Lab Results   Component Value Date    CHLORIDE 103 10/23/2024    CHLORIDE 105 07/20/2022    CHLORIDE 107 06/28/2021     Lab Results   Component Value Date    DEBORAH 7.5 10/23/2024    DEBORAH 9.1 06/28/2021     Lab Results   Component Value Date    CO2 23 10/23/2024    CO2 26 07/20/2022    CO2 25 06/28/2021     Lab Results   Component Value Date    BUN 24.7 10/23/2024    BUN 32 07/20/2022    BUN 33 06/28/2021     Lab Results   Component Value Date    CR 1.52 10/23/2024    CR 1.62 06/28/2021       GFR Estimate   Date Value Ref Range Status   05/22/2025 37 (L) >60 mL/min/1.73m2 Final     Comment:     eGFR calculated using 2021 CKD-EPI equation.   05/05/2025 35 (L) >60 mL/min/1.73m2 Final     Comment:     eGFR calculated using 2021 CKD-EPI equation.   04/23/2025 41 (L) >60 mL/min/1.73m2 Final     Comment:     eGFR calculated using 2021 CKD-EPI equation.   06/28/2021 46 (L) >60 mL/min/[1.73_m2] Final     Comment:     Non  GFR Calc  Starting 12/18/2018, serum creatinine based estimated GFR (eGFR) will be   calculated using the Chronic Kidney Disease Epidemiology Collaboration   (CKD-EPI) equation.     06/14/2021 49 (L) >60 mL/min/[1.73_m2] Final     Comment:     Non  GFR Calc  Starting 12/18/2018, serum creatinine based estimated GFR (eGFR) will be  "  calculated using the Chronic Kidney Disease Epidemiology Collaboration   (CKD-EPI) equation.     06/04/2021 46 (L) >60 mL/min/[1.73_m2] Final     Comment:     Non  GFR Calc  Starting 12/18/2018, serum creatinine based estimated GFR (eGFR) will be   calculated using the Chronic Kidney Disease Epidemiology Collaboration   (CKD-EPI) equation.       GFR, ESTIMATED POCT   Date Value Ref Range Status   05/22/2025 33 (L) >60 mL/min/1.73m2 Final   03/13/2025 37 (L) >60 mL/min/1.73m2 Final   12/04/2024 46 (L) >60 mL/min/1.73m2 Final     GFR Estimate If Black   Date Value Ref Range Status   06/28/2021 54 (L) >60 mL/min/[1.73_m2] Final     Comment:      GFR Calc  Starting 12/18/2018, serum creatinine based estimated GFR (eGFR) will be   calculated using the Chronic Kidney Disease Epidemiology Collaboration   (CKD-EPI) equation.     06/14/2021 57 (L) >60 mL/min/[1.73_m2] Final     Comment:      GFR Calc  Starting 12/18/2018, serum creatinine based estimated GFR (eGFR) will be   calculated using the Chronic Kidney Disease Epidemiology Collaboration   (CKD-EPI) equation.     06/04/2021 53 (L) >60 mL/min/[1.73_m2] Final     Comment:      GFR Calc  Starting 12/18/2018, serum creatinine based estimated GFR (eGFR) will be   calculated using the Chronic Kidney Disease Epidemiology Collaboration   (CKD-EPI) equation.         Lab Results   Component Value Date    MICROL 1,144.0 09/05/2024    MICROL 47 04/20/2022    MICROL 563 02/26/2021     No results found for: \"MICROALBUMIN\"  No results found for: \"CPEPT\", \"GADAB\", \"ISCAB\"    Vitamin B12   Date Value Ref Range Status   08/15/2023 491 232 - 1,245 pg/mL Final   01/11/2022 2,179 (H) 193 - 986 pg/mL Final   06/13/2020 682 193 - 986 pg/mL Final   02/04/2019 3,006 (H) 193 - 986 pg/mL Final   ]    Most recent eye exam date: : Not Found     FIB-4 Calculation: 2.18 at 10/23/2024 10:44 AM  Calculated from:  SGOT/AST: 27 U/L at " 10/23/2024 10:44 AM  SGPT/ALT: 24 U/L at 10/23/2024 10:44 AM  Platelets: 152 10e3/uL at 10/23/2024 10:44 AM  Age: 59 years  A value of FIB-4 below 1.30 is considered as low risk for advanced fibrosis; a value of FIB-4 over 2.67 is considered as high risk for advanced fibrosis; and FIB-4 values between 1.30 and 2.67 are considered as intermediate risk of advanced fibrosis.    Assessment/Plan:     1.  Type 2 diabetes- Doing well with glucose control (A1c 5.6%), but he has been having low blood sugars after meals.       You are doing a great job and your blood sugar is very well-managed but I am worried about the low blood sugars after meals.    Please take just 8-10 units with meals to avoid low blood sugar after meals.  If needed you can decrease this further.     Continue Tresiba 14 units daily, but decrease meal time Novolog to 8 - 10 units to avoid low blood sugar after meals.   If you need to fast for a test or procedure please take just 10 units of Tresiba that day.    Target glucose is .     Take Novolog before meals and bedtime, but with caution to avoid low blood sugars.     Please see the diabetes nurse to review your blood sugars and make a plan to address any further low blood sugar <70.  Let us know if need to be seen sooner for BG<70.      If you have concerns, please send me a [employer] message M-Th, call the clinic at [phone], or call [phone] after hours/weekends and ask to speak with the endocrinologist on call.      2.  Risk factors-     Retinopathy:  Yes.  Had eye exam within last year.   Nephropathy:  BP historically well controlled.+ albuminuria.  Creatinine stable. GFR has been dropping - he will now see nephrology every 6 months.    Neuropathy: Yes.   Feet: OK, no ulcers. +neuropathy Sees podiatry today. Wears diabetic shoe.   Lipids:  TG were high at last check. Goal LDL <70. Patient taking rosuvastatin     3.  F/U in 3-4 weeks with DM education review hypoglycemia.  Self or Vasquez  6 months.      26 minutes spent on the date of the encounter doing chart review, review of test results, review and interpretation of glucose data, patient visit and documentation, counseling/coordination of care, and discussion of follow up plan for worsening hyper and hypoglycemia.  The patient understood and is satisfied with today's visit.     It is my privilege to be involved in the care of the above patient.     Tish Toscano PA-C, [facility]  Diabetes, Endocrinology, and Metabolism  [phone] Appointments/Nurse Line  [phone] Fax  [phone] office  [email or web address]                         Virtual Visit Details    Type of service:  Video Visit   12:04 - 12:24    Originating Location (pt. Location): Home    Distant Location (provider location):  On-site  Platform used for Video Visit: Odette      Outcome for 05/27/25 6:44 AM: Data obtained via [employer] website  Yojana Wang MA    Patient is showing 5/5 [place] met.   Yojana Wang MA                Again, thank you for allowing me to participate in the care of your patient.      Sincerely,    Tish Toscano PA-C

## 2025-05-27 NOTE — NURSING NOTE
Current patient location: [address] [facility] [address]    Is the patient currently in the state of [place]? YES    Visit mode: VIDEO    If the visit is dropped, the patient can be reconnected by:VIDEO VISIT: Text to cell phone:   Telephone Information:   Mobile [phone]       Will anyone else be joining the visit? NO  (If patient encounters technical issues they should call [phone]    Are changes needed to the allergy or medication list? No and Pt stated no med changes    Are refills needed on medications prescribed by this physician? NO    Rooming Documentation:  Not applicable    Reason for visit: KHUSHBU OLEARY

## 2025-05-27 NOTE — PATIENT INSTRUCTIONS
Dear Miller,    You are doing a great job and your blood sugar is very well-managed but I am worried about the low blood sugars after meals.  Please take just 8-10 units with meals to avoid low blood sugar after meals.  If needed you can decrease this further.     Continue Tresiba 14 units daily, but decrease meal time Novolog to 8 - 10 units to avoid low blood sugar after meals.   If you need to fast for a test or procedure please take just 10 units of Tresiba that day.    Target glucose is .     Take Novolog before meals and bedtime, but with caution to avoid low blood sugars.     Please see the diabetes nurse to review your blood sugars and make a plan to address any further low blood sugar <70.  Let us know if need to be seen sooner for BG<70.          If you have concerns, please us a My Chart message or call the clinic at [phone], or call [phone] after hours/weekends and ask to speak with the endocrinologist on call.       My best wishes,    Tish Toscano PA-C, MPAS  [facility]  Diabetes, Endocrinology, and Metabolism  [phone] Appointments/Nurse  [phone] Fax

## 2025-05-28 ENCOUNTER — PATIENT OUTREACH (OUTPATIENT)
Dept: CARE COORDINATION | Facility: CLINIC | Age: 60
End: 2025-05-28
Payer: MEDICARE

## 2025-05-31 ASSESSMENT — ASTHMA QUESTIONNAIRES: ACT_TOTALSCORE: 12

## 2025-06-03 ENCOUNTER — TELEPHONE (OUTPATIENT)
Dept: ENDOCRINOLOGY | Facility: CLINIC | Age: 60
End: 2025-06-03
Payer: MEDICARE

## 2025-06-03 NOTE — TELEPHONE ENCOUNTER
[facility]  Fax:[phone]  Spec: [phone]  MO: [phone]

## 2025-06-04 NOTE — TELEPHONE ENCOUNTER
Central Prior Authorization Team   Phone: [phone]    PA Initiation    Medication: TRESIBA GLEXTOUCH 100 UNIT/ML   Insurance Company: [employer] - Phone [phone] Fax [phone]  Pharmacy Filling the Rx: [facility] - [place], [place] - [facility] [address]  Filling Pharmacy Phone: [phone]  Filling Pharmacy Fax:    Start Date: 6/4/2025

## 2025-06-05 ENCOUNTER — OFFICE VISIT (OUTPATIENT)
Dept: FAMILY MEDICINE | Facility: CLINIC | Age: 60
End: 2025-06-05
Payer: MEDICARE

## 2025-06-05 ENCOUNTER — TELEPHONE (OUTPATIENT)
Dept: ENDOCRINOLOGY | Facility: CLINIC | Age: 60
End: 2025-06-05

## 2025-06-05 VITALS
BODY MASS INDEX: 25.22 KG/M2 | RESPIRATION RATE: 16 BRPM | SYSTOLIC BLOOD PRESSURE: 129 MMHG | OXYGEN SATURATION: 100 % | HEART RATE: 75 BPM | WEIGHT: 170.3 LBS | TEMPERATURE: 97.4 F | DIASTOLIC BLOOD PRESSURE: 81 MMHG | HEIGHT: 69 IN

## 2025-06-05 DIAGNOSIS — C78.6 MALIGNANT NEOPLASM METASTATIC TO PERITONEUM (H): ICD-10-CM

## 2025-06-05 DIAGNOSIS — E11.3593 PROLIFERATIVE DIABETIC RETINOPATHY OF BOTH EYES ASSOCIATED WITH TYPE 2 DIABETES MELLITUS, UNSPECIFIED PROLIFERATIVE RETINOPATHY TYPE (H): Primary | ICD-10-CM

## 2025-06-05 DIAGNOSIS — J30.2 SEASONAL ALLERGIC RHINITIS, UNSPECIFIED TRIGGER: ICD-10-CM

## 2025-06-05 DIAGNOSIS — E11.3293 TYPE 2 DIABETES MELLITUS WITH MILD NONPROLIFERATIVE RETINOPATHY OF BOTH EYES WITHOUT MACULAR EDEMA, UNSPECIFIED WHETHER LONG TERM INSULIN USE (H): Primary | ICD-10-CM

## 2025-06-05 RX ORDER — MONTELUKAST SODIUM 10 MG/1
10 TABLET ORAL AT BEDTIME
Qty: 90 TABLET | Refills: 3 | Status: SHIPPED | OUTPATIENT
Start: 2025-06-05

## 2025-06-05 NOTE — TELEPHONE ENCOUNTER
Prior Authorization Approval    Authorization Effective Date: 5/21/2025  Authorization Expiration Date:  UNTIL FURTHER NOTICE  Medication: TRESIBA GLEXTOUCH 100 UNIT/ML -PA APPROVED   Approved Dose/Quantity: QUANTITY 15 UNITS PER 93 DAYS   Reference #:     Insurance Company: [employer] - Phone [phone] Fax [phone]  Expected CoPay:       CoPay Card Available:      Foundation Assistance Needed:    Which Pharmacy is filling the prescription (Not needed for infusion/clinic administered): [facility] - [place], [place] - [address]  Pharmacy Notified:  Yes  Patient Notified:  No
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
## 2025-06-05 NOTE — TELEPHONE ENCOUNTER
This message is to inform you that we are in need of Medicare compliant prescriptions for Freestyle Benjamin 3 Plus Sensors.     Prescription must be written by: Tish Toscano.  Must include full supply name: Freestyle Benjamin 3 Plus Sensor  Quantity: 6  Refills: 1  SIG: Change every 15 days    As a reminder, Medicare requires patients to be seen every 3 months for insulin supplies and every 6 months for CGMs. The provider who sees the patient must be the provider on the prescription, must be written on the day of or after office visit date and office visit must include notes about diabetes and insulin regimen. The Diabetes Care Services team at [facility] may request new prescriptions before renewal dates of the prescription is filled over the allowable amount. Writing the order to match what is above will help ensure we will only need to request every 3 or 6 months.    Thank you!    [facility]  Diabetes Care Services Team  [address] [place] [address]  Provider Phone: [phone]  Patient Line: [phone]  Fax: [phone]  E-mail: [email or web address]

## 2025-06-07 ENCOUNTER — HEALTH MAINTENANCE LETTER (OUTPATIENT)
Age: 60
End: 2025-06-07

## 2025-06-09 DIAGNOSIS — E11.3293 TYPE 2 DIABETES MELLITUS WITH MILD NONPROLIFERATIVE RETINOPATHY OF BOTH EYES WITHOUT MACULAR EDEMA, UNSPECIFIED WHETHER LONG TERM INSULIN USE (H): ICD-10-CM

## 2025-06-09 RX ORDER — HYDROCHLOROTHIAZIDE 12.5 MG/1
CAPSULE ORAL
Qty: 6 EACH | Refills: 1 | Status: SHIPPED | OUTPATIENT
Start: 2025-06-09 | End: 2025-06-10

## 2025-06-10 DIAGNOSIS — C78.6 MALIGNANT NEOPLASM METASTATIC TO PERITONEUM (H): ICD-10-CM

## 2025-06-10 DIAGNOSIS — E11.3293 TYPE 2 DIABETES MELLITUS WITH MILD NONPROLIFERATIVE RETINOPATHY OF BOTH EYES WITHOUT MACULAR EDEMA, UNSPECIFIED WHETHER LONG TERM INSULIN USE (H): ICD-10-CM

## 2025-06-10 DIAGNOSIS — C22.0 HCC (HEPATOCELLULAR CARCINOMA) (H): Primary | ICD-10-CM

## 2025-06-10 RX ORDER — HYDROCHLOROTHIAZIDE 12.5 MG/1
CAPSULE ORAL
Qty: 6 EACH | Refills: 1 | Status: SHIPPED | OUTPATIENT
Start: 2025-06-10

## 2025-06-10 NOTE — PROGRESS NOTES
I have evaluated this patient and I am prescribing/continuing to prescribe a continuous glucose monitor for the following reasons:  1. The patient has a diagnosis of Type 2 Diabetes; and  2. I have concluded that the patient (or patient's caregiver) has sufficient training using the continuous glucose monitor prescribed as evidenced by providing a prescription; and  3. The continuous glucose monitor is prescribed in accordance with its FDA indications for use; and  4. The patient for whom a continuous glucose monitor is being prescribed, meets at least ONE of the criteria below:   The patient is insulin-treated  5. Within the last six (6) months I have had an in-person or Medicare-approved telehealth visit with the beneficiary to evaluate their diabetes control and determined that criteria 1-4 above are met.   It is my privilege to be involved in the care of the above patient.     Tish Toscano PA-C, MPAS  Diabetes, Endocrinology, and Metabolism  [phone] Appointments/Nurse Line  [phone] Fax  [phone] office  [email or web address]

## 2025-06-15 NOTE — TELEPHONE ENCOUNTER
FUTURE VISIT INFORMATION:    Date: 2/5/19    Time: 1230    Location: [facility]  REFERRAL INFORMATION:    Referring provider:  Transplant    Referring providers clinic:      Reason for visit/diagnosis:  New Liver Eval.    All records in [facility] and care everywhere.           Initial (On Arrival)

## 2025-06-16 ENCOUNTER — TELEPHONE (OUTPATIENT)
Dept: ONCOLOGY | Facility: CLINIC | Age: 60
End: 2025-06-16
Payer: MEDICARE

## 2025-06-16 DIAGNOSIS — C22.0 HCC (HEPATOCELLULAR CARCINOMA) (H): ICD-10-CM

## 2025-06-16 DIAGNOSIS — Z94.4 STATUS POST LIVER TRANSPLANTATION (H): ICD-10-CM

## 2025-06-16 DIAGNOSIS — C78.6 MALIGNANT NEOPLASM METASTATIC TO PERITONEUM (H): ICD-10-CM

## 2025-06-16 NOTE — TELEPHONE ENCOUNTER
Lomotil Refill   Last prescribing provider: Dr Villarreal     Last clinic visit date: 5/29/25 Dr Villarreal     Recommendations for requested medication (if none, N/A): N/A    Any other pertinent information (if none, N/A): N/A    Refilled: Y/N, if NO, why?

## 2025-06-16 NOTE — ORAL ONC MGMT
Oral Chemotherapy Monitoring Program     Placed call to patient in follow up of oral chemotherapy, Lenvima. Monthly assessment due. Left message requesting call back. No drug names were mentioned. Will update when response received.     Viki Benson, LesD  Oral Chemotherapy Monitoring Program  [facility]  [phone]

## 2025-06-17 ENCOUNTER — LAB (OUTPATIENT)
Dept: LAB | Facility: CLINIC | Age: 60
End: 2025-06-17
Attending: INTERNAL MEDICINE
Payer: MEDICARE

## 2025-06-17 ENCOUNTER — RESULTS FOLLOW-UP (OUTPATIENT)
Dept: TRANSPLANT | Facility: CLINIC | Age: 60
End: 2025-06-17

## 2025-06-17 DIAGNOSIS — N18.32 ANEMIA OF CHRONIC RENAL FAILURE, STAGE 3B (H): ICD-10-CM

## 2025-06-17 DIAGNOSIS — Z94.4 LIVER REPLACED BY TRANSPLANT (H): ICD-10-CM

## 2025-06-17 DIAGNOSIS — D63.1 ANEMIA OF CHRONIC RENAL FAILURE, STAGE 3B (H): ICD-10-CM

## 2025-06-17 DIAGNOSIS — E11.29 TYPE 2 DIABETES MELLITUS WITH MICROALBUMINURIA, WITH LONG-TERM CURRENT USE OF INSULIN (H): ICD-10-CM

## 2025-06-17 DIAGNOSIS — Z79.899 ENCOUNTER FOR LONG-TERM (CURRENT) USE OF MEDICATIONS: ICD-10-CM

## 2025-06-17 DIAGNOSIS — R80.9 TYPE 2 DIABETES MELLITUS WITH MICROALBUMINURIA, WITH LONG-TERM CURRENT USE OF INSULIN (H): ICD-10-CM

## 2025-06-17 DIAGNOSIS — N18.31 STAGE 3A CHRONIC KIDNEY DISEASE (H): ICD-10-CM

## 2025-06-17 DIAGNOSIS — C78.6 MALIGNANT NEOPLASM METASTATIC TO PERITONEUM (H): ICD-10-CM

## 2025-06-17 DIAGNOSIS — I12.9 HYPERTENSION, RENAL: ICD-10-CM

## 2025-06-17 DIAGNOSIS — E11.3293 TYPE 2 DIABETES MELLITUS WITH MILD NONPROLIFERATIVE RETINOPATHY OF BOTH EYES WITHOUT MACULAR EDEMA, UNSPECIFIED WHETHER LONG TERM INSULIN USE (H): ICD-10-CM

## 2025-06-17 DIAGNOSIS — E83.51 HYPOCALCEMIA: ICD-10-CM

## 2025-06-17 DIAGNOSIS — Z79.4 TYPE 2 DIABETES MELLITUS WITH MICROALBUMINURIA, WITH LONG-TERM CURRENT USE OF INSULIN (H): ICD-10-CM

## 2025-06-17 LAB
ALBUMIN MFR UR ELPH: 62.6 MG/DL
ALBUMIN SERPL BCG-MCNC: 4.6 G/DL (ref 3.5–5.2)
ALBUMIN UR-MCNC: 30 MG/DL
ALP SERPL-CCNC: 87 U/L (ref 40–150)
ALT SERPL W P-5'-P-CCNC: 16 U/L (ref 0–70)
ANION GAP SERPL CALCULATED.3IONS-SCNC: 14 MMOL/L (ref 7–15)
APPEARANCE UR: CLEAR
AST SERPL W P-5'-P-CCNC: 20 U/L (ref 0–45)
BASOPHILS # BLD AUTO: 0 10E3/UL (ref 0–0.2)
BASOPHILS NFR BLD AUTO: 0 %
BILIRUB SERPL-MCNC: 0.3 MG/DL
BILIRUB UR QL STRIP: NEGATIVE
BILIRUBIN DIRECT (ROCHE PRO & PURE): 0.13 MG/DL (ref 0–0.45)
BUN SERPL-MCNC: 57.1 MG/DL (ref 8–23)
CALCIUM SERPL-MCNC: 10 MG/DL (ref 8.8–10.4)
CHLORIDE SERPL-SCNC: 102 MMOL/L (ref 98–107)
COLOR UR AUTO: ABNORMAL
CREAT SERPL-MCNC: 2.63 MG/DL (ref 0.67–1.17)
CREAT UR-MCNC: 93.5 MG/DL
CREAT UR-MCNC: 95 MG/DL
EGFRCR SERPLBLD CKD-EPI 2021: 27 ML/MIN/1.73M2
EOSINOPHIL # BLD AUTO: 0.1 10E3/UL (ref 0–0.7)
EOSINOPHIL NFR BLD AUTO: 1 %
ERYTHROCYTE [DISTWIDTH] IN BLOOD BY AUTOMATED COUNT: 14 % (ref 10–15)
EST. AVERAGE GLUCOSE BLD GHB EST-MCNC: 117 MG/DL
GLUCOSE SERPL-MCNC: 186 MG/DL (ref 70–99)
GLUCOSE UR STRIP-MCNC: 200 MG/DL
HBA1C MFR BLD: 5.7 %
HCO3 SERPL-SCNC: 21 MMOL/L (ref 22–29)
HCT VFR BLD AUTO: 30.5 % (ref 40–53)
HGB BLD-MCNC: 9.5 G/DL (ref 13.3–17.7)
HGB UR QL STRIP: NEGATIVE
IMM GRANULOCYTES # BLD: 0 10E3/UL
IMM GRANULOCYTES NFR BLD: 1 %
KETONES UR STRIP-MCNC: NEGATIVE MG/DL
LEUKOCYTE ESTERASE UR QL STRIP: NEGATIVE
LYMPHOCYTES # BLD AUTO: 0.8 10E3/UL (ref 0.8–5.3)
LYMPHOCYTES NFR BLD AUTO: 10 %
MAGNESIUM SERPL-MCNC: 2.1 MG/DL (ref 1.7–2.3)
MCH RBC QN AUTO: 30.1 PG (ref 26.5–33)
MCHC RBC AUTO-ENTMCNC: 31.1 G/DL (ref 31.5–36.5)
MCV RBC AUTO: 97 FL (ref 78–100)
MICROALBUMIN UR-MCNC: 387 MG/L
MICROALBUMIN/CREAT UR: 407.37 MG/G CR (ref 0–17)
MONOCYTES # BLD AUTO: 0.5 10E3/UL (ref 0–1.3)
MONOCYTES NFR BLD AUTO: 6 %
NEUTROPHILS # BLD AUTO: 6.4 10E3/UL (ref 1.6–8.3)
NEUTROPHILS NFR BLD AUTO: 82 %
NITRATE UR QL: NEGATIVE
NRBC # BLD AUTO: 0 10E3/UL
NRBC BLD AUTO-RTO: 0 /100
PH UR STRIP: 5 [PH] (ref 5–7)
PHOSPHATE SERPL-MCNC: 3.8 MG/DL (ref 2.5–4.5)
PLATELET # BLD AUTO: 173 10E3/UL (ref 150–450)
POTASSIUM SERPL-SCNC: 5.4 MMOL/L (ref 3.4–5.3)
PROT SERPL-MCNC: 7.3 G/DL (ref 6.4–8.3)
PROT/CREAT 24H UR: 0.67 MG/MG CR (ref 0–0.2)
PTH-INTACT SERPL-MCNC: 226 PG/ML (ref 15–65)
RBC # BLD AUTO: 3.16 10E6/UL (ref 4.4–5.9)
RBC URINE: <1 /HPF
SODIUM SERPL-SCNC: 137 MMOL/L (ref 135–145)
SP GR UR STRIP: 1.02 (ref 1–1.03)
SQUAMOUS EPITHELIAL: <1 /HPF
TSH SERPL DL<=0.005 MIU/L-ACNC: 3.5 UIU/ML (ref 0.3–4.2)
UROBILINOGEN UR STRIP-MCNC: NORMAL MG/DL
VIT D+METAB SERPL-MCNC: 44 NG/ML (ref 20–50)
WBC # BLD AUTO: 7.8 10E3/UL (ref 4–11)
WBC URINE: <1 /HPF

## 2025-06-17 PROCEDURE — 84443 ASSAY THYROID STIM HORMONE: CPT | Performed by: PATHOLOGY

## 2025-06-17 PROCEDURE — 84100 ASSAY OF PHOSPHORUS: CPT | Performed by: PATHOLOGY

## 2025-06-17 PROCEDURE — 83735 ASSAY OF MAGNESIUM: CPT | Performed by: PATHOLOGY

## 2025-06-17 PROCEDURE — 84156 ASSAY OF PROTEIN URINE: CPT | Performed by: PATHOLOGY

## 2025-06-17 PROCEDURE — 82306 VITAMIN D 25 HYDROXY: CPT

## 2025-06-17 PROCEDURE — 36415 COLL VENOUS BLD VENIPUNCTURE: CPT | Performed by: PATHOLOGY

## 2025-06-17 PROCEDURE — 82570 ASSAY OF URINE CREATININE: CPT

## 2025-06-17 PROCEDURE — 82248 BILIRUBIN DIRECT: CPT | Performed by: PATHOLOGY

## 2025-06-17 PROCEDURE — 85025 COMPLETE CBC W/AUTO DIFF WBC: CPT | Performed by: PATHOLOGY

## 2025-06-17 PROCEDURE — 81001 URINALYSIS AUTO W/SCOPE: CPT | Performed by: PATHOLOGY

## 2025-06-17 PROCEDURE — 80053 COMPREHEN METABOLIC PANEL: CPT | Performed by: PATHOLOGY

## 2025-06-17 PROCEDURE — 83036 HEMOGLOBIN GLYCOSYLATED A1C: CPT

## 2025-06-17 PROCEDURE — 99000 SPECIMEN HANDLING OFFICE-LAB: CPT | Performed by: PATHOLOGY

## 2025-06-17 PROCEDURE — 83970 ASSAY OF PARATHORMONE: CPT | Performed by: PATHOLOGY

## 2025-06-18 ENCOUNTER — TELEPHONE (OUTPATIENT)
Dept: ONCOLOGY | Facility: CLINIC | Age: 60
End: 2025-06-18
Payer: MEDICARE

## 2025-06-18 RX ORDER — DIPHENOXYLATE HYDROCHLORIDE AND ATROPINE SULFATE 2.5; .025 MG/1; MG/1
2 TABLET ORAL 4 TIMES DAILY
Qty: 240 TABLET | Refills: 3 | Status: SHIPPED | OUTPATIENT
Start: 2025-06-18

## 2025-06-18 NOTE — ORAL ONC MGMT
Oral Chemotherapy Monitoring Program  Lab Follow Up    Reviewed lab results from 6/17/25.        4/23/2025    10:00 AM 5/5/2025     2:00 PM 5/12/2025     3:00 PM 5/23/2025     8:00 AM 6/10/2025     9:00 AM 6/16/2025    12:00 PM 6/18/2025    11:00 AM   ORAL CHEMOTHERAPY   Assessment Type Initial Follow up Lab Monitoring Refill Lab Monitoring Refill Left Voicemail Lab Monitoring;Left Voicemail   Diagnosis Code Hepatocellular Cancer Hepatocellular Cancer Hepatocellular Cancer Hepatocellular Cancer Hepatocellular Cancer Hepatocellular Cancer Hepatocellular Cancer   Providers Dr. Cherelle Villarreal   [facility]   Is this patient followed by the [facility] OC team?      No No   Drug Name Lenvima (lenvatinib)  Lenvima (lenvatinib)  Lenvima (lenvatinib)  Lenvima (lenvatinib)  Lenvima (lenvatinib)  Lenvima (lenvatinib) Lenvima (lenvatinib)   Dose 8 mg  8 mg  8 mg  8 mg  8 mg  8 mg 8 mg   Current Schedule Daily  Daily  Daily  Daily  Daily  Daily Daily   Cycle Details Continuous Continuous Continuous Continuous Continuous Continuous Continuous   Start Date of Last Cycle 4/21/2025         Adherence Assessment Adherent         Adverse Effects Diarrhea   Other (See Note for Details)   Other (See Note for Details)   Diarrhea Grade 1         Pharmacist Intervention(diarrhea) Yes         Intervention(s) Patient education;OTC recommendation         Other (See Note for Details)    hyperkalemia   hyperkalemia   Pharmacist intervention(other)    Yes      Intervention(s)    --      Is the dose as ordered appropriate for the patient?    Yes          Data saved with a previous flowsheet row definition       Labs:  _  Result Component Current Result Ref Range   Sodium 137 (6/17/2025) 135 - 145 mmol/L     _  Result Component Current Result Ref Range   Potassium 5.4 (H) (6/17/2025) 3.4 - 5.3 mmol/L     _  Result Component Current  Result Ref Range   Calcium 10.0 (6/17/2025) 8.8 - 10.4 mg/dL     _  Result Component Current Result Ref Range   Magnesium 2.1 (6/17/2025) 1.7 - 2.3 mg/dL     _  Result Component Current Result Ref Range   Phosphorus 3.8 (6/17/2025) 2.5 - 4.5 mg/dL     _  Result Component Current Result Ref Range   Albumin 4.6 (6/17/2025) 3.5 - 5.2 g/dL     _  Result Component Current Result Ref Range   Urea Nitrogen 57.1 (H) (6/17/2025) 8.0 - 23.0 mg/dL     _  Result Component Current Result Ref Range   Creatinine 2.63 (H) (6/17/2025) 0.67 - 1.17 mg/dL     _  Result Component Current Result Ref Range   AST 20 (6/17/2025) 0 - 45 U/L     _  Result Component Current Result Ref Range   ALT 16 (6/17/2025) 0 - 70 U/L     _  Result Component Current Result Ref Range   Bilirubin Total 0.3 (6/17/2025) <=1.2 mg/dL     _  Result Component Current Result Ref Range   WBC Count 7.8 (6/17/2025) 4.0 - 11.0 10e3/uL     _  Result Component Current Result Ref Range   Hemoglobin 9.5 (L) (6/17/2025) 13.3 - 17.7 g/dL     _  Result Component Current Result Ref Range   Platelet Count 173 (6/17/2025) 150 - 450 10e3/uL     _  Result Component Current Result Ref Range   Absolute Neutrophils 6.4 (6/17/2025) 1.6 - 8.3 10e3/uL     No results found for ANC within last 30 days.        Assessment & Plan:  K remains slightly elevated, Serum Creatinine jumped to 2.63  Hemoglobin 9.5 but relatively stable compared to last few months    Oral Chemo team has left two voicemails this week to check in on mitchell Barakatt sent today as well.    Per Chart review Yakelin Soares's office also attempting to reach out to patient to see if he is consuming drinks or supplements that may contain potassium. Recent nephrology appointment was apparently canceled due to chemo related GI illness.  Last note from Dr Cherelle Barakat was tolerated.   OC team will attempt to reach out again later this week to check on GI symptoms, hydration status and if K supplements are being  used.    Follow-Up:  Loop in care team once update from patient is received, currently next lab and ONC followup is not until the end of July    Courtney Panda, PharmD, [facility]  Oral Chemotherapy Monitoring Program  [facility]  [phone]

## 2025-06-19 ENCOUNTER — OFFICE VISIT (OUTPATIENT)
Dept: OPHTHALMOLOGY | Facility: CLINIC | Age: 60
End: 2025-06-19
Attending: OPHTHALMOLOGY
Payer: MEDICARE

## 2025-06-19 DIAGNOSIS — E11.3593 PROLIFERATIVE DIABETIC RETINOPATHY OF BOTH EYES ASSOCIATED WITH TYPE 2 DIABETES MELLITUS, UNSPECIFIED PROLIFERATIVE RETINOPATHY TYPE (H): ICD-10-CM

## 2025-06-19 PROCEDURE — 92134 CPTRZ OPH DX IMG PST SGM RTA: CPT | Performed by: OPHTHALMOLOGY

## 2025-06-19 ASSESSMENT — VISUAL ACUITY
CORRECTION_TYPE: GLASSES
OD_CC+: -1
METHOD: SNELLEN - LINEAR
OS_CC: 20/30
OD_CC: 20/20

## 2025-06-19 ASSESSMENT — TONOMETRY
OD_IOP_MMHG: 13
OS_IOP_MMHG: 10
IOP_METHOD: ICARE

## 2025-06-19 ASSESSMENT — SLIT LAMP EXAM - LIDS
COMMENTS: SMALL PAPILLOMA ALONG LL MARGIN, NON CONCERNING
COMMENTS: NORMAL

## 2025-06-19 ASSESSMENT — EXTERNAL EXAM - LEFT EYE: OS_EXAM: NORMAL

## 2025-06-19 ASSESSMENT — EXTERNAL EXAM - RIGHT EYE: OD_EXAM: NORMAL

## 2025-06-19 ASSESSMENT — CUP TO DISC RATIO
OS_RATIO: 0.4
OD_RATIO: 0.3

## 2025-06-19 NOTE — PROGRESS NOTES
CC:  status post yag laser right eye 3.12.25  Follow up Diabetic retinopathy     Interval: Pt states no change in VA since last visit.  No flashes or floaters No eye pain or redness LBS: 201 Last A1C: 5.6 Lab Results Component Value Date A1C 5.6 12/04/2024.   Still on cancer treatment 2x/day getting scans again tomorrow.    HPI: Frandy Workman is a 60 year old man with POH of PDR OS and severe NPDR OD who presents for follow up. PMH includescirrhosis + HCC s/p transplantation 11/11/2019 with posttransplant course complicated by HCC recurrence and calcineurin induced renal toxicity. Past medical history includes CAD status post CABG 7/2021, type II diabetes, HTN, and CKD.      Retinal Imaging:  Optical Coherence Tomography 06/19/25   RE: normal contour/laminations. Mild Epiretinal membrane; tiny cystic changes   LE:  normal contour/laminations. Mild Epiretinal membrane; tiny cystic changes     fluorescein angiography 09/13/23  both eyes normal AV and choroidal filling ou. Staining laser scars. No obvious NVE, mild late macula leakage. peripheral leakage and capillary non perfusion.     Optos FA 03/12/25   OD: mild burden of MA; some late small vessel leakage parafoveal and more diffusely; no NV  OS: mild burden of MA; some late small vessel leakage parafoveal and more diffusely; no NV    ASSESSMENT:     #T2DM   - HbA1c 5.6% (12/2024)  - Blood pressure (<120/80) and blood glucose (HbA1c <7.0, ~6.5 today) control discussed with patient. Patient advised that failure to adequately control each may lead to vision loss. The patient expressed understanding.    # Proliferative diabetic retinopathy left eye  # pre-proliferative diabetic retinopathy right eye    # vitreous hemorrhage left eye 10/12/22   Status post Eylea inj left eye   Status post Panretinal laser photocoagulation (PRP) 9.28.22 left eye and Status post scatter PRP right eye 11/02/22     # Diabetic macular edema both eyes   - status post avastin in both eyes;  Switch to Eylea 4/24/19 with improvement of Diabetic macular edema     - Tried holding off on injection 3/29/23, but edema worsened both eyes  - last Eylea right eye; 8/02/23 interval improvement 09/13/23 (9 weeks prior)  -  discussed with to T&E vs closed observation on 9/13/23, He preferred to observe   - Given mild worsening 10/04/23 changed to Vabysmo right eye (10/04/23 #1; 1.10.23 #3)  - 03/12/25 VA 20/25 OU; only tr noncentral DME OU; FA without NV; monitor without injections  - Last injections not since 3/2024; continue to monitor and treat PRN OU    # CEIOL OU  Right eye: 01/24/23; left eye 01/3/23  With posterior capsular opacity (PCO) right eye worse than left eye  Status post YAG OD 03/12/25   Retina attached     Consider yag left eye in the future if patient becomes symptomatic    # Dry eye syndrome   Left eye worse than right eye   warm compresses and artificial tears  As needed  - punctal plugs previously left eye - reports this is better     # Status post liver transplant  - tx 11/2019  - severe anemia; s/p transfusion - anemia much improved   - being seen by his primary team    PLAN: Treat PRN both eyes  Last dilation and fluorescein angiography was on 03/12/25     follow up in 3 months with Optical Coherence Tomography; dilation  fluorescein angiography around sept 2025    Consider yag left eye in the future if patient becomes symptomatic    ~~~~~~~~~~~~~~~~~~~~~~~~~~~~~~~~~~   Complete documentation of historical and exam elements from today's encounter can be found in the full encounter summary report (not reduplicated in this progress note).  I personally obtained the chief complaint(s) and history of present illness.  I confirmed and edited as necessary the review of systems, past medical/surgical history, family history, social history, and examination findings as documented by others; and I examined the patient myself.  I personally reviewed the relevant tests, images, and reports as  documented above.  I formulated and edited as necessary the assessment and plan and discussed the findings and management plan with the patient and family    Enriqueta Martin MD   of Ophthalmology.  Retina Service   Department of Ophthalmology and Visual Neurosciences   [facility]  Phone: [phone]   Fax: [phone]

## 2025-06-20 ENCOUNTER — PATIENT OUTREACH (OUTPATIENT)
Dept: NEPHROLOGY | Facility: CLINIC | Age: 60
End: 2025-06-20
Payer: MEDICARE

## 2025-06-20 DIAGNOSIS — N18.31 STAGE 3A CHRONIC KIDNEY DISEASE (H): Primary | ICD-10-CM

## 2025-06-23 ENCOUNTER — TELEPHONE (OUTPATIENT)
Dept: ONCOLOGY | Facility: CLINIC | Age: 60
End: 2025-06-23
Payer: MEDICARE

## 2025-06-23 NOTE — ORAL ONC MGMT
Oral Chemotherapy Monitoring Program    Subjective/Objective:  Frandy Workman is a 60 year old male contacted by phone for a follow-up visit for oral chemotherapy. Call placed to patient to follow up on diarrhea and lab results. Miller reports that he did have significant diarrhea last week, however it was due to being out of Lomotil medication. He ran out of medication early in the week and was unable to get a refill until Thursday. Since restarting Lomotil, he has improved and not having any diarrhea. He asked if okay to take Lomotil at night - discussed this is okay. He also was feeling sick last week in general and likely was dehydrated. He missed his nephrology appointment due to feeling sick. Miller is not taking any supplements containing potassium, but does drink Gatorade drinks often when he is ill. He is feeling better now and eating well again.         5/5/2025     2:00 PM 5/12/2025     3:00 PM 5/23/2025     8:00 AM 6/10/2025     9:00 AM 6/16/2025    12:00 PM 6/18/2025    11:00 AM 6/23/2025     1:00 PM   ORAL CHEMOTHERAPY   Assessment Type Lab Monitoring Refill Lab Monitoring Refill Left Voicemail Lab Monitoring;Left Voicemail Other   Diagnosis Code Hepatocellular Cancer Hepatocellular Cancer Hepatocellular Cancer Hepatocellular Cancer Hepatocellular Cancer Hepatocellular Cancer Hepatocellular Cancer   Providers Dr. Cherelle Villarreal   [facility]   Is this patient followed by the [facility] OC team?     No No    Drug Name Lenvima (lenvatinib)  Lenvima (lenvatinib)  Lenvima (lenvatinib)  Lenvima (lenvatinib)  Lenvima (lenvatinib) Lenvima (lenvatinib) Lenvima (lenvatinib)   Dose 8 mg  8 mg  8 mg  8 mg  8 mg 8 mg 8 mg   Current Schedule Daily  Daily  Daily  Daily  Daily Daily Daily   Cycle Details Continuous Continuous Continuous Continuous Continuous Continuous Continuous   Adverse Effects   Other  "(See Note for Details)   Other (See Note for Details)    Other (See Note for Details)   hyperkalemia   hyperkalemia    Pharmacist intervention(other)   Yes       Intervention(s)   --       Is the dose as ordered appropriate for the patient?   Yes           Data saved with a previous flowsheet row definition     Last PHQ-2 Score on record:       5/27/2025    11:41 AM 3/27/2025    12:17 PM   PHQ-2 ( 1999 [employer])   Q1: Little interest or pleasure in doing things 0 0   Q2: Feeling down, depressed or hopeless 0 0   PHQ-2 Score 0 0   PHQ-2 Total Score (12-17 Years)- Positive if 3 or more points; Administer PHQ-A if positive 0 0     Vitals:  BP:   BP Readings from Last 1 Encounters:   06/05/25 129/81     Wt Readings from Last 1 Encounters:   06/05/25 77.2 kg (170 lb 4.8 oz)     Estimated body surface area is 1.94 meters squared as calculated from the following:    Height as of 6/5/25: 1.753 m (5' 9\").    Weight as of 6/5/25: 77.2 kg (170 lb 4.8 oz).    Labs:  _  Result Component Current Result Ref Range   Sodium 137 (6/17/2025) 135 - 145 mmol/L     _  Result Component Current Result Ref Range   Potassium 5.4 (H) (6/17/2025) 3.4 - 5.3 mmol/L     _  Result Component Current Result Ref Range   Calcium 10.0 (6/17/2025) 8.8 - 10.4 mg/dL     _  Result Component Current Result Ref Range   Magnesium 2.1 (6/17/2025) 1.7 - 2.3 mg/dL     _  Result Component Current Result Ref Range   Phosphorus 3.8 (6/17/2025) 2.5 - 4.5 mg/dL     _  Result Component Current Result Ref Range   Albumin 4.6 (6/17/2025) 3.5 - 5.2 g/dL     _  Result Component Current Result Ref Range   Urea Nitrogen 57.1 (H) (6/17/2025) 8.0 - 23.0 mg/dL     _  Result Component Current Result Ref Range   Creatinine 2.63 (H) (6/17/2025) 0.67 - 1.17 mg/dL     _  Result Component Current Result Ref Range   AST 20 (6/17/2025) 0 - 45 U/L     _  Result Component Current Result Ref Range   ALT 16 (6/17/2025) 0 - 70 U/L     _  Result Component Current Result Ref Range   Bilirubin " Total 0.3 (6/17/2025) <=1.2 mg/dL     _  Result Component Current Result Ref Range   WBC Count 7.8 (6/17/2025) 4.0 - 11.0 10e3/uL     _  Result Component Current Result Ref Range   Hemoglobin 9.5 (L) (6/17/2025) 13.3 - 17.7 g/dL     _  Result Component Current Result Ref Range   Platelet Count 173 (6/17/2025) 150 - 450 10e3/uL     _  Result Component Current Result Ref Range   Absolute Neutrophils 6.4 (6/17/2025) 1.6 - 8.3 10e3/uL     No results found for ANC within last 30 days.      Assessment/Plan:  Dehydration and diarrhea may be contributing to elevated Scr and hyperkalemia on 6/17. Plan to continue Lenvima as prescribed. Inbasket message sent to care team to provide update and to follow up on if repeat labs needed or any changes needed.     Follow-Up:  7/28 labs (or earlier)     Irasema Prakash, PharmD   Oral Chemotherapy Monitoring Program  [facility]   [phone]

## 2025-06-25 ENCOUNTER — DOCUMENTATION ONLY (OUTPATIENT)
Dept: FAMILY MEDICINE | Facility: CLINIC | Age: 60
End: 2025-06-25
Payer: MEDICARE

## 2025-06-25 NOTE — PROGRESS NOTES
Type of Form Received: [employer] Request for Additional Information     Form Received (Date) 6/25/25   Form Filled out No   Placed in provider folder Yes

## 2025-06-26 ENCOUNTER — HOSPITAL ENCOUNTER (EMERGENCY)
Facility: CLINIC | Age: 60
Discharge: HOME | End: 2025-06-26
Attending: EMERGENCY MEDICINE | Admitting: EMERGENCY MEDICINE
Payer: MEDICARE

## 2025-06-26 ENCOUNTER — VIRTUAL VISIT (OUTPATIENT)
Dept: EDUCATION SERVICES | Facility: CLINIC | Age: 60
End: 2025-06-26
Payer: MEDICARE

## 2025-06-26 ENCOUNTER — MYC REFILL (OUTPATIENT)
Dept: OPHTHALMOLOGY | Facility: CLINIC | Age: 60
End: 2025-06-26

## 2025-06-26 VITALS
DIASTOLIC BLOOD PRESSURE: 68 MMHG | OXYGEN SATURATION: 99 % | WEIGHT: 163 LBS | RESPIRATION RATE: 18 BRPM | HEIGHT: 69 IN | SYSTOLIC BLOOD PRESSURE: 109 MMHG | BODY MASS INDEX: 24.14 KG/M2

## 2025-06-26 DIAGNOSIS — E11.3293 TYPE 2 DIABETES MELLITUS WITH MILD NONPROLIFERATIVE RETINOPATHY OF BOTH EYES WITHOUT MACULAR EDEMA, UNSPECIFIED WHETHER LONG TERM INSULIN USE (H): Primary | ICD-10-CM

## 2025-06-26 DIAGNOSIS — H04.129 DRY EYE: ICD-10-CM

## 2025-06-26 DIAGNOSIS — S01.311A LACERATION OF RIGHT EXTERNAL EAR, INITIAL ENCOUNTER: ICD-10-CM

## 2025-06-26 PROCEDURE — 99282 EMERGENCY DEPT VISIT SF MDM: CPT | Performed by: EMERGENCY MEDICINE

## 2025-06-26 RX ORDER — CARBOXYMETHYLCELLULOSE SODIUM, GLYCERIN AND POLYSORBATE 80 5; 10; 5 MG/ML; MG/ML; MG/ML
1 SOLUTION/ DROPS OPHTHALMIC 4 TIMES DAILY PRN
Qty: 60 EACH | Refills: 11 | Status: CANCELLED | OUTPATIENT
Start: 2025-06-26

## 2025-06-26 ASSESSMENT — ACTIVITIES OF DAILY LIVING (ADL): ADLS_ACUITY_SCORE: 54

## 2025-06-26 NOTE — NURSING NOTE
Current patient location: [place]    Is the patient currently in the state of [place]? YES    Visit mode: VIDEO    If the visit is dropped, the patient can be reconnected by:VIDEO VISIT: Text to cell phone:   Telephone Information:   Mobile [phone]       Will anyone else be joining the visit? NO  (If patient encounters technical issues they should call [phone])    Are changes needed to the allergy or medication list? E-check in was reviewed/completed for today's visit.  did not review e-check in information (medications) again with [facility] due to this. Allergies not reviewed per department protocol.      Are refills needed on medications prescribed by this physician? Discuss with provider    Rooming Documentation:  Not applicable    Reason for visit: Diabetes Education    [facility]

## 2025-06-26 NOTE — ED TRIAGE NOTES
Ambulatory into triage with complaints of bleeding from earlobe, first noticed around around 7:00 pm and persists. Pt takes Elliquis twice daily and is a cancer patient.

## 2025-06-26 NOTE — LETTER
6/26/2025      Frandy Workman  [address] [address]      Dear Colleague,    Thank you for referring your patient, Frandy Workman, to the [facility]. Please see a copy of my visit note below.    Virtual Visit Details    Type of service:  Video Visit   Video Start Time: 7:56 AM  Video End Time:9:00 AM    Originating Location (pt. Location): Home    Distant Location (provider location):  Off-site  Platform used for Video Visit: Vopium      Diabetes Self-Management Education & Support    Frandy Workman presents today for education related to Type 2 diabetes    Patient is being treated with:  MDI Insulin  He is accompanied by self    Year of diagnosis: 30 years ago, sweaty, weight gain  Referring provider:  Tish Toscano  Living Situation: Home alone  Employment: Disabled since 2014,      PATIENT CONCERNS/REASON FOR REFERRAL   Managing diet choices with high K+    ASSESSMENT:    Taking Medication:     Current Diabetes Management per Patient:  Taking diabetes medications?   Novolog 8-12 units per meal  Tresiba 14 units daily  Jardiance 10mg daily    Monitoring    Patient glucose self monitoring as follows: continuously using a continuous glucose monitor (CGM)  BG meter: Benjamin 3 CGM  CGM: Freestyle Benjamin 3  BG results:          Patient's most recent   Lab Results   Component Value Date    A1C 5.7 06/17/2025    A1C 6.0 12/30/2020      Patient's A1C goal: <7.0    Activity: Walks 4,000 steps per day, weight lifting    Healthy Eating:   Patient currently eats 2 meals, 0-1 snacks per day   Breakfast: Egg whites with tomatoes, slice of toast  Dinner: Fish tacos, sushi  Snack: Apple juice, occasional chips    Problem Solving:    Patient is at risk of hypoglycemia?: YES, feels lows in 50's, lightheaded,sweaty, treats with juice or tootsie pop  Hospitalizations for hyper or hypoglycemia: Yes (Please explain): Hyperglycemia age 31, hypoglycemia years ago    Healthy Coping and  Stress Management:   Sources of stress identified by patient: My health  Coping mechanisms identified by patient:  Other (please specify):  Walks, talks to people, watches sports, time with daughter      EDUCATION and INSTRUCTION PROVIDED AT THIS VISIT:    Longstanding T2 seen for comprehensive and hypoglycemia review at the request of Tish Toscano. Miller was diagnosed with T2 at age 29 in [place], initially started on Metformin, believes insulin was added at age 38. 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 Glucose is well managed with A1C of 5.7.  TIR 73% with 1% mild lows. Average glucose 155. Since last visit with Tish lows have improved, still having lows after dinner. Saturday he took 22 units within a short time that caused him to drop. Continue Tresiba at present dose. Advised to drop meal time insulin to 6-8 units. Follow up with Diabetes Education as needed.     Topics Reviewed:  Pathophysiology and progression of diagnosis  Target blood sugar goals  Target A1C goal  Sensor TIR goals  Sensor and meter variability  Effect that carbohydrates has on blood sugar  Benefit of exercise  Complications from chronic high blood sugars  What can increase blood sugar values such as steroid and illness  Insulin;onset/peak/duration  15-15 rule  Hypoglycemia complications     Education Materials Provided: Healthy Living with Diabetes-via links    Patient-stated goal written and given to Frandy Workman.  Verbalized and demonstrated understanding of instructions.   See patient instructions  AVS printed and given to patient-via My Chart    PLAN:  *Continue Tresiba 14 units daily  *Reduce Novolog to 8  units before meals  *If glucose is <100 going into a meal, drop Novolog to 6 units  *Fasting blood sugar goal   *15-15 rule    FOLLOW-UP:    *11/24 Frannie Vasquez    Time spent with patient at today's visit was 61 minutes.      Melissa Cotton RN, [facility]  Diabetes Education Department  [facility], [place]    Any diabetes medication initiation or dose changes were made via the [facility] Standing Orders per the patient's referring provider. A copy of this encounter was shared with the provider.      Again, thank you for allowing me to participate in the care of your patient.        Sincerely,        Melissa Cotton RN    Electronically signed

## 2025-06-26 NOTE — ED PROVIDER NOTES
"  History     Chief Complaint   Patient presents with    Coagulation Disorder     HPI  Frandy Workman is a 60 year old male with PMH notable for liver transplant in 2019, DM2, anticoagulation with apixaban who presents to the ED with bleeding from the ear.  Patient reports that he discovered he had some bleeding from his right ear around 7 PM.  He went to the bathroom and use the paper towels to clean it up.  The bleeding stopped and he continued working.  He finished around midnight and then had a friend bring him in for evaluation since he takes a blood thinner.  Patient also notes some small areas of purple appearance on his right forearm, no other external bleeding.  No bloody nor black bowel movements, no vomiting.    Physical Exam   Height: 175.3 cm (5' 9\")  Weight: 73.9 kg (163 lb)    Physical Exam  General: no acute distress. Appears stated age.   HENT: MMM, no oropharyngeal lesions.  Right ear: 3 mm superficial abrasion on the pinna, no blood in ear canal, normal TM.  Left ear canal, external structures, and TM normal.  Eyes: PERRL, normal sclerae   Cardio: Regular rate. Regular rhythm. Extremities well perfused  Resp: Normal work of breathing, Normal respiratory rate.   Abdomen: no tenderness, non-distended, no rebound, no guarding  Skin: Few small petechia on right forearm  Neuro: alert without signs of confusion. CN II-XII grossly intact. Grossly normal strength and sensation in all extremities.   Psych: normal affect, normal behavior      ED Course      Procedures              Labs Ordered and Resulted from Time of ED Arrival to Time of ED Departure - No data to display  No orders to display            Medical Decision Making  The patient's presentation was of moderate complexity (a chronic illness mild to moderate exacerbation, progression, or side effect of treatment).    The patient's evaluation involved:  review of 1 test result(s) ordered prior to this encounter (CBC 6/17/2025)    The patient's " management necessitated only low risk treatment.      Assessments & Plan   Patient presenting with recent bleeding on the external right ear. Vitals in the ED unremarkable. Nursing notes reviewed.     Hemoglobin was 9.5 just 9 days ago.  The described amount of blood loss is minor.  Dried blood was cleansed from the right ear and the ear was examined.  The source is a small 3 mm wide abrasion on the external ear.  No bleeding in the ear canal and normal TM.  Hemostasis was easily reachieved after cleansing the area with a couple minutes of direct pressure.    The complete clinical picture is most consistent with external ear bleeding secondary to small abrasion complicated by anticoagulation. After counseling on the diagnosis, work-up, and treatment plan, the patient was discharged to home. The patient was advised to follow-up with primary care in a few days if any recurrent bleeding. The patient was advised to return to the ED if worsening symptoms, or any urgent health concerns.     Final diagnoses:   Laceration of right external ear, initial encounter     New Prescriptions    No medications on file     --  Berry Husain MD   Emergency Medicine   [facility] EMERGENCY DEPARTMENT  6/26/2025       Berry Husain MD  06/26/25 0205

## 2025-06-26 NOTE — DISCHARGE INSTRUCTIONS
Instructions from your doctor today:  Emergency Department (ED) testing is focused on the potential causes of your symptoms that are the most dangerous possibilities, and cannot cover every possibility. Based on the evaluation, it was deemed sufficiently safe to discharge and continue management through the clinics. Thus, follow-up is very important to assess for improvement/worsening, potential further testing, and potential treatment adjustments. If you were given opioid pain medications or other medications that can make you drowsy while in the ED, you should not drive for at least several hours and not until you feel completely back to normal.     Please make an appointment to follow up with:  - Your Primary Care Provider in 2-3 days if any minor bleeding recurs  - If you do not have a primary care provider, you can be seen in follow-up and establish care by calling any of the clinics below:     - [facility] (phone: [phone])     - Primary Care / [facility] (phone: [phone])   - Have your clinic provider review the results from today's visit with you again, including any potential follow-up or additional testing that may be needed based on the results. Occasionally, incidental findings are found on later review by radiologists that may need follow-up.     Return to the Emergency Department immediately if you have worsening symptoms, or any other urgent health concerns.

## 2025-06-26 NOTE — PATIENT INSTRUCTIONS
PLAN:  *Continue Tresiba 14 units daily  *Reduce Novolog to 8 units before meals  *If glucose is <100 going into a meal, drop Novolog to 6 units  *Fasting blood sugar goal     FOLLOW-UP:    *11/24 Frannie Vasquez

## 2025-06-26 NOTE — PROGRESS NOTES
Virtual Visit Details    Type of service:  Video Visit   Video Start Time: 7:56 AM  Video End Time:9:00 AM    Originating Location (pt. Location): Home    Distant Location (provider location):  Off-site  Platform used for Video Visit: Travergence      Diabetes Self-Management Education & Support    Frandy Workman presents today for education related to Type 2 diabetes    Patient is being treated with:  MDI Insulin  He is accompanied by self    Year of diagnosis: 30 years ago, sweaty, weight gain  Referring provider:  Tish Toscano  Living Situation: Home alone  Employment: Disabled since 2014,      PATIENT CONCERNS/REASON FOR REFERRAL   Managing diet choices with high K+    ASSESSMENT:    Taking Medication:     Current Diabetes Management per Patient:  Taking diabetes medications?   Novolog 8-12 units per meal  Tresiba 14 units daily  Jardiance 10mg daily    Monitoring    Patient glucose self monitoring as follows: continuously using a continuous glucose monitor (CGM)  BG meter: Benjamin 3 CGM  CGM: Freestyle Benjamin 3  BG results:          Patient's most recent   Lab Results   Component Value Date    A1C 5.7 06/17/2025    A1C 6.0 12/30/2020      Patient's A1C goal: <7.0    Activity: Walks 4,000 steps per day, weight lifting    Healthy Eating:   Patient currently eats 2 meals, 0-1 snacks per day   Breakfast: Egg whites with tomatoes, slice of toast  Dinner: Fish tacos, sushi  Snack: Apple juice, occasional chips    Problem Solving:    Patient is at risk of hypoglycemia?: YES, feels lows in 50's, lightheaded,sweaty, treats with juice or tootsie pop  Hospitalizations for hyper or hypoglycemia: Yes (Please explain): Hyperglycemia age 31, hypoglycemia years ago    Healthy Coping and Stress Management:   Sources of stress identified by patient: My health  Coping mechanisms identified by patient:  Other (please specify):  Walks, talks to people, watches sports, time with daughter      EDUCATION and INSTRUCTION PROVIDED  AT THIS VISIT:    Longstanding T2 seen for comprehensive and hypoglycemia review at the request of Tish Toscano. Miller was diagnosed with T2 at age 29 in [place], initially started on Metformin, believes insulin was added at age 38. 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 Glucose is well managed with A1C of 5.7.  TIR 73% with 1% mild lows. Average glucose 155. Since last visit with Tish lows have improved, still having lows after dinner. Saturday he took 22 units within a short time that caused him to drop. Continue Tresiba at present dose. Advised to drop meal time insulin to 6-8 units. Follow up with Diabetes Education as needed.     Topics Reviewed:  Pathophysiology and progression of diagnosis  Target blood sugar goals  Target A1C goal  Sensor TIR goals  Sensor and meter variability  Effect that carbohydrates has on blood sugar  Benefit of exercise  Complications from chronic high blood sugars  What can increase blood sugar values such as steroid and illness  Insulin;onset/peak/duration  15-15 rule  Hypoglycemia complications     Education Materials Provided: Healthy Living with Diabetes-via links    Patient-stated goal written and given to Frandy Workman.  Verbalized and demonstrated understanding of instructions.   See patient instructions  AVS printed and given to patient-via My Chart    PLAN:  *Continue Tresiba 14 units daily  *Reduce Novolog to 8 units before meals  *If glucose is <100 going into a meal, drop Novolog to 6 units  *Fasting blood sugar goal   *15-15 rule    FOLLOW-UP:    *11/24 Frannie Vasquez    Time spent with patient at today's visit was 61 minutes.       Melissa Cotton RN, [facility]  Diabetes Education Department  [facility], [place]    Any diabetes medication initiation or dose changes were made via the [facility] Standing Orders per the patient's referring provider. A copy of this encounter was shared with the provider.

## 2025-07-02 ENCOUNTER — PATIENT OUTREACH (OUTPATIENT)
Dept: NEPHROLOGY | Facility: CLINIC | Age: 60
End: 2025-07-02
Payer: MEDICARE

## 2025-07-02 NOTE — PROGRESS NOTES
7/2- Called Miller to check in on how he is feeling and see if he can repeat a renal panel this week per Kait. He said he is feeling okay. He gets fatigued and just needs to rest more often. He is eating and hydrating well. No imediete questions or concerns. He will go in 7/3/25 at 0930 to repeat a renal panel. Orders placed and lab ordered per Kait.  Maximo NIETO RN  Nephrology care coordinator  [facility]

## 2025-07-02 NOTE — PROGRESS NOTES
7/2- Called Miller to see if he could get a repeat renal panel done this week per Kait's request. I spoke to Miller and he will have his labs repeated.  Maximo NIETO RN  Nephrology care coordinator  [facility]

## 2025-07-03 ENCOUNTER — LAB (OUTPATIENT)
Dept: LAB | Facility: CLINIC | Age: 60
End: 2025-07-03
Payer: MEDICARE

## 2025-07-03 ENCOUNTER — RESULTS FOLLOW-UP (OUTPATIENT)
Dept: MULTI SPECIALTY CLINIC | Facility: CLINIC | Age: 60
End: 2025-07-03

## 2025-07-03 DIAGNOSIS — N18.31 STAGE 3A CHRONIC KIDNEY DISEASE (H): ICD-10-CM

## 2025-07-03 DIAGNOSIS — Z94.4 LIVER REPLACED BY TRANSPLANT (H): ICD-10-CM

## 2025-07-03 LAB
ALBUMIN SERPL BCG-MCNC: 4.3 G/DL (ref 3.5–5.2)
ALBUMIN SERPL BCG-MCNC: 4.3 G/DL (ref 3.5–5.2)
ALP SERPL-CCNC: 70 U/L (ref 40–150)
ALT SERPL W P-5'-P-CCNC: 19 U/L (ref 0–70)
ANION GAP SERPL CALCULATED.3IONS-SCNC: 13 MMOL/L (ref 7–15)
ANION GAP SERPL CALCULATED.3IONS-SCNC: 13 MMOL/L (ref 7–15)
AST SERPL W P-5'-P-CCNC: 22 U/L (ref 0–45)
BILIRUB SERPL-MCNC: 0.3 MG/DL
BILIRUBIN DIRECT (ROCHE PRO & PURE): 0.12 MG/DL (ref 0–0.45)
BUN SERPL-MCNC: 58.2 MG/DL (ref 8–23)
BUN SERPL-MCNC: 59.1 MG/DL (ref 8–23)
CALCIUM SERPL-MCNC: 8.8 MG/DL (ref 8.8–10.4)
CALCIUM SERPL-MCNC: 8.9 MG/DL (ref 8.8–10.4)
CHLORIDE SERPL-SCNC: 106 MMOL/L (ref 98–107)
CHLORIDE SERPL-SCNC: 106 MMOL/L (ref 98–107)
CREAT SERPL-MCNC: 2.37 MG/DL (ref 0.67–1.17)
CREAT SERPL-MCNC: 2.4 MG/DL (ref 0.67–1.17)
EGFRCR SERPLBLD CKD-EPI 2021: 30 ML/MIN/1.73M2
EGFRCR SERPLBLD CKD-EPI 2021: 30 ML/MIN/1.73M2
ERYTHROCYTE [DISTWIDTH] IN BLOOD BY AUTOMATED COUNT: 14.4 % (ref 10–15)
GLUCOSE SERPL-MCNC: 162 MG/DL (ref 70–99)
GLUCOSE SERPL-MCNC: 164 MG/DL (ref 70–99)
HCO3 SERPL-SCNC: 17 MMOL/L (ref 22–29)
HCO3 SERPL-SCNC: 17 MMOL/L (ref 22–29)
HCT VFR BLD AUTO: 28.3 % (ref 40–53)
HGB BLD-MCNC: 8.8 G/DL (ref 13.3–17.7)
MAGNESIUM SERPL-MCNC: 1.6 MG/DL (ref 1.7–2.3)
MCH RBC QN AUTO: 30.1 PG (ref 26.5–33)
MCHC RBC AUTO-ENTMCNC: 31.1 G/DL (ref 31.5–36.5)
MCV RBC AUTO: 97 FL (ref 78–100)
PHOSPHATE SERPL-MCNC: 4.2 MG/DL (ref 2.5–4.5)
PLATELET # BLD AUTO: 128 10E3/UL (ref 150–450)
POTASSIUM SERPL-SCNC: 4.8 MMOL/L (ref 3.4–5.3)
POTASSIUM SERPL-SCNC: 4.9 MMOL/L (ref 3.4–5.3)
PROT SERPL-MCNC: 6.8 G/DL (ref 6.4–8.3)
RBC # BLD AUTO: 2.92 10E6/UL (ref 4.4–5.9)
SODIUM SERPL-SCNC: 136 MMOL/L (ref 135–145)
SODIUM SERPL-SCNC: 136 MMOL/L (ref 135–145)
WBC # BLD AUTO: 4.7 10E3/UL (ref 4–11)

## 2025-07-03 NOTE — ORAL ONC MGMT
Oral Chemotherapy Monitoring Program  Lab Follow Up    Reviewed lab results.        5/12/2025     3:00 PM 5/23/2025     8:00 AM 6/10/2025     9:00 AM 6/16/2025    12:00 PM 6/18/2025    11:00 AM 6/23/2025     1:00 PM 7/3/2025    11:00 AM   ORAL CHEMOTHERAPY   Assessment Type Refill Lab Monitoring Refill Left Voicemail Lab Monitoring;Left Voicemail Other Lab Monitoring;Other   Diagnosis Code Hepatocellular Cancer Hepatocellular Cancer Hepatocellular Cancer Hepatocellular Cancer Hepatocellular Cancer Hepatocellular Cancer Hepatocellular Cancer   Providers Dr. Cherelle Villarreal   [facility]   Is this patient followed by the [facility] OC team?    No No     Drug Name Lenvima (lenvatinib)  Lenvima (lenvatinib)  Lenvima (lenvatinib)  Lenvima (lenvatinib) Lenvima (lenvatinib) Lenvima (lenvatinib) Lenvima (lenvatinib)   Dose 8 mg  8 mg  8 mg  8 mg 8 mg 8 mg    Current Schedule Daily  Daily  Daily  Daily Daily Daily    Cycle Details Continuous Continuous Continuous Continuous Continuous Continuous    Adverse Effects  Other (See Note for Details)   Other (See Note for Details)     Other (See Note for Details)  hyperkalemia   hyperkalemia     Pharmacist intervention(other)  Yes        Intervention(s)  --        Is the dose as ordered appropriate for the patient?  Yes            Data saved with a previous flowsheet row definition       Labs:  _  Result Component Current Result Ref Range   Sodium 136 (7/3/2025) 135 - 145 mmol/L    136 (7/3/2025) 135 - 145 mmol/L     _  Result Component Current Result Ref Range   Potassium 4.8 (7/3/2025) 3.4 - 5.3 mmol/L    4.9 (7/3/2025) 3.4 - 5.3 mmol/L     _  Result Component Current Result Ref Range   Calcium 8.8 (7/3/2025) 8.8 - 10.4 mg/dL    8.9 (7/3/2025) 8.8 - 10.4 mg/dL     _  Result Component Current Result Ref Range   Magnesium 1.6 (L) (7/3/2025) 1.7 - 2.3 mg/dL      _  Result Component Current Result Ref Range   Phosphorus 4.2 (7/3/2025) 2.5 - 4.5 mg/dL     _  Result Component Current Result Ref Range   Albumin 4.3 (7/3/2025) 3.5 - 5.2 g/dL    4.3 (7/3/2025) 3.5 - 5.2 g/dL     _  Result Component Current Result Ref Range   Urea Nitrogen 59.1 (H) (7/3/2025) 8.0 - 23.0 mg/dL    58.2 (H) (7/3/2025) 8.0 - 23.0 mg/dL     _  Result Component Current Result Ref Range   Creatinine 2.40 (H) (7/3/2025) 0.67 - 1.17 mg/dL    2.37 (H) (7/3/2025) 0.67 - 1.17 mg/dL     _  Result Component Current Result Ref Range   AST 22 (7/3/2025) 0 - 45 U/L     _  Result Component Current Result Ref Range   ALT 19 (7/3/2025) 0 - 70 U/L     _  Result Component Current Result Ref Range   Bilirubin Total 0.3 (7/3/2025) <=1.2 mg/dL     _  Result Component Current Result Ref Range   WBC Count 4.7 (7/3/2025) 4.0 - 11.0 10e3/uL     _  Result Component Current Result Ref Range   Hemoglobin 8.8 (L) (7/3/2025) 13.3 - 17.7 g/dL     _  Result Component Current Result Ref Range   Platelet Count 128 (L) (7/3/2025) 150 - 450 10e3/uL     _  Result Component Current Result Ref Range   Absolute Neutrophils 6.4 (6/17/2025) 1.6 - 8.3 10e3/uL     No results found for ANC within last 30 days.        Assessment & Plan:  Labs meet parameters to continue treatment. Nephrology sent message to Miller in close follow up of labs they ordered so he was not contacted by oral chemo team regarding lab results. No changes to plan at this time.    Follow-Up:      Americo Culver PharmD  [facility]  [phone]  July 3, 2025

## 2025-07-06 DIAGNOSIS — C78.6 MALIGNANT NEOPLASM METASTATIC TO PERITONEUM (H): ICD-10-CM

## 2025-07-06 DIAGNOSIS — C22.0 HCC (HEPATOCELLULAR CARCINOMA) (H): Primary | ICD-10-CM

## 2025-07-07 DIAGNOSIS — C22.0 HCC (HEPATOCELLULAR CARCINOMA) (H): Primary | ICD-10-CM

## 2025-07-07 DIAGNOSIS — C78.6 MALIGNANT NEOPLASM METASTATIC TO PERITONEUM (H): ICD-10-CM

## 2025-07-12 DIAGNOSIS — N18.32 ANEMIA OF CHRONIC RENAL FAILURE, STAGE 3B (H): ICD-10-CM

## 2025-07-12 DIAGNOSIS — Z94.4 LIVER TRANSPLANT RECIPIENT (H): ICD-10-CM

## 2025-07-12 DIAGNOSIS — I82.452 ACUTE DEEP VEIN THROMBOSIS (DVT) OF LEFT PERONEAL VEIN (H): ICD-10-CM

## 2025-07-12 DIAGNOSIS — C78.6 MALIGNANT NEOPLASM METASTATIC TO PERITONEUM (H): ICD-10-CM

## 2025-07-12 DIAGNOSIS — C22.0 HCC (HEPATOCELLULAR CARCINOMA) (H): ICD-10-CM

## 2025-07-12 DIAGNOSIS — E11.3293 TYPE 2 DIABETES MELLITUS WITH MILD NONPROLIFERATIVE RETINOPATHY OF BOTH EYES WITHOUT MACULAR EDEMA, UNSPECIFIED WHETHER LONG TERM INSULIN USE (H): ICD-10-CM

## 2025-07-12 DIAGNOSIS — D63.1 ANEMIA OF CHRONIC RENAL FAILURE, STAGE 3B (H): ICD-10-CM

## 2025-07-12 DIAGNOSIS — N18.32 STAGE 3B CHRONIC KIDNEY DISEASE (H): ICD-10-CM

## 2025-07-14 ENCOUNTER — OFFICE VISIT (OUTPATIENT)
Dept: ORTHOPEDICS | Facility: CLINIC | Age: 60
End: 2025-07-14
Payer: MEDICARE

## 2025-07-14 DIAGNOSIS — M21.969 TYPE 2 DIABETES MELLITUS WITH DIABETIC FOOT DEFORMITY (H): ICD-10-CM

## 2025-07-14 DIAGNOSIS — Z79.4 TYPE 2 DIABETES MELLITUS WITH STAGE 3A CHRONIC KIDNEY DISEASE, WITH LONG-TERM CURRENT USE OF INSULIN (H): ICD-10-CM

## 2025-07-14 DIAGNOSIS — E11.628 TYPE 2 DIABETES MELLITUS WITH PRESSURE CALLUS (H): ICD-10-CM

## 2025-07-14 DIAGNOSIS — E11.22 TYPE 2 DIABETES MELLITUS WITH STAGE 3A CHRONIC KIDNEY DISEASE, WITH LONG-TERM CURRENT USE OF INSULIN (H): ICD-10-CM

## 2025-07-14 DIAGNOSIS — N18.31 TYPE 2 DIABETES MELLITUS WITH STAGE 3A CHRONIC KIDNEY DISEASE, WITH LONG-TERM CURRENT USE OF INSULIN (H): ICD-10-CM

## 2025-07-14 DIAGNOSIS — L84 TYPE 2 DIABETES MELLITUS WITH PRESSURE CALLUS (H): ICD-10-CM

## 2025-07-14 DIAGNOSIS — L60.2 ONYCHAUXIS: ICD-10-CM

## 2025-07-14 DIAGNOSIS — E11.69 TYPE 2 DIABETES MELLITUS WITH DIABETIC FOOT DEFORMITY (H): ICD-10-CM

## 2025-07-14 DIAGNOSIS — E11.49 TYPE II OR UNSPECIFIED TYPE DIABETES MELLITUS WITH NEUROLOGICAL MANIFESTATIONS, NOT STATED AS UNCONTROLLED(250.60) (H): Primary | ICD-10-CM

## 2025-07-14 PROCEDURE — 99214 OFFICE O/P EST MOD 30 MIN: CPT | Mod: 25 | Performed by: PODIATRIST

## 2025-07-14 PROCEDURE — 11719 TRIM NAIL(S) ANY NUMBER: CPT | Mod: Q8 | Performed by: PODIATRIST

## 2025-07-14 NOTE — PROGRESS NOTES
Past Medical History:   Diagnosis Date    Anemia 2013    Arthritis     Bipolar disorder (H) 1989    Noted by Dr when dealing with depression    Bleeding disorder 2023    Take medicine daily complications from cancer    BPH (benign prostatic hyperplasia)     CAD (coronary artery disease) 04/01/2019    Cholelithiasis     Conductive hearing loss 08/16/2017    Depressive disorder 1986    Suffer effects throughout life    Diabetes mellitus, type 2 (H) 2002    Insulin Dependent since     Gastroesophageal reflux disease 12/01/2014    HCC (hepatocellular carcinoma) (H) 01/22/2019    History of blood transfusion 2019    Had blood transfussion until Dec 2022    History of diabetic retinopathy 07/2018    HTN (hypertension) 11/20/2019    Hyperlipidemia     Liver cirrhosis secondary to ESTRADA (H)     Liver failure (H) 12/1/2014    On thre transplant list since 5.2013 - Off list July 16    Liver transplanted (H) 11/11/2019    Major depression 1988    Over worked and disabled for a month    Palpitations 2010    Getting some as walking increases avg 7,600 per datr    Portal vein thrombosis 04/11/2019    Renal failure 12/1/2014    Was in comma from liver disease    SCC (squamous cell carcinoma) 02/15/2023    02/15/23:  Left temporal scalp    Shortness of breath 1/24/19    Had problems after liver albation    Type II diabetes mellitus (H)      Patient Active Problem List   Diagnosis    Bipolar affective disorder in remission    Esophageal varices determined by endoscopy (H)    Erectile dysfunction due to diseases classified elsewhere    HCC (hepatocellular carcinoma) (H)    Equivocal stress echocardiogram    Type 2 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, unspecified whether long term insulin use (H)    Status post coronary angiogram    CAD (coronary artery disease)    Liver transplant recipient (H)    Status post liver transplantation (H)    Immunosuppressed status    Benign  essential hypertension    Anemia of chronic renal failure, stage 3a (H)    History of coronary artery disease    Dyslipidemia    Excessive sweating    Stage 3b chronic kidney disease (H)    Anemia of chronic renal failure, stage 3b (H)    NSTEMI (non-ST elevated myocardial infarction) (H)    Vitreous hemorrhage of left eye (H)    Proliferative diabetic retinopathy of both eyes associated with type 2 diabetes mellitus, unspecified proliferative retinopathy type (H)    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)    Hyperkalemia    ARIELA (obstructive sleep apnea)    History of nonmelanoma skin cancer    Neoplasm of unspecified behavior of bone, soft tissue, and skin    SCC (squamous cell carcinoma)    AK (actinic keratosis)     Past Surgical History:   Procedure Laterality Date    ABDOMEN SURGERY  11/11/2019    Had Liver Transplant    BIOPSY  12/2022    Had biopsy done will have additional procedure 2/15/2023    BYPASS GRAFT ARTERY CORONARY N/A 07/14/2021    Procedure: median sternotomy, on cardiopulmonary bypass, CORONARY ARTERY BYPASS GRAFT (CABG) x2 with left greater saphenous vein endoscopic harvest and left internal mammery artery harvest;  Surgeon: Tom Zapata MD;  Location: [facility] OR    CARDIAC SURGERY  7/2021    Heart Graph. and bypass surgery    CHOLECYSTECTOMY  11/11/2022    Removed with Liver Transplant    COLONOSCOPY      2015    COLONOSCOPY N/A 12/06/2019    Procedure: COLONOSCOPY, WITH POLYPECTOMY AND BIOPSY;  Surgeon: Adam Morton MD;  Location:  GI    CV CENTRAL VENOUS CATHETER PLACEMENT N/A 07/12/2021    Procedure: Central Venous Catheter Placement;  Surgeon: Fermin Polanco MD;  Location: [facility] CARDIAC CATH LAB    CV CORONARY ANGIOGRAM N/A 07/12/2021    Procedure: Coronary Angiogram;  Surgeon: Fermin Polanco MD;  Location: [facility] CARDIAC CATH LAB    CV HEART CATHETERIZATION WITH POSSIBLE INTERVENTION N/A 02/26/2019    Procedure: CORS;  Surgeon:  Jagdish Hoyt MD;  Location:  HEART CARDIAC CATH LAB    CV INTRA AORTIC BALLOON N/A 07/12/2021    Procedure: Intra Aortic Balloon Pump Insertion;  Surgeon: Fermin Polanco MD;  Location: [facility] CARDIAC CATH LAB    CYSTOSCOPY      ESOPHAGOSCOPY, GASTROSCOPY, DUODENOSCOPY (EGD), COMBINED N/A 11/17/2016    Procedure: COMBINED ESOPHAGOSCOPY, GASTROSCOPY, DUODENOSCOPY (EGD);  Surgeon: Santi Rosas MD;  Location:  GI    ESOPHAGOSCOPY, GASTROSCOPY, DUODENOSCOPY (EGD), COMBINED N/A 11/17/2017    Procedure: COMBINED ESOPHAGOSCOPY, GASTROSCOPY, DUODENOSCOPY (EGD);  EGD;  Surgeon: Santi Rosas MD;  Location:  GI    ESOPHAGOSCOPY, GASTROSCOPY, DUODENOSCOPY (EGD), COMBINED N/A 12/28/2018    Procedure: EGD;  Surgeon: Santi Rosas MD;  Location:  OR    ESOPHAGOSCOPY, GASTROSCOPY, DUODENOSCOPY (EGD), COMBINED N/A 12/06/2019    Procedure: ESOPHAGOGASTRODUODENOSCOPY, WITH BIOPSY;  Surgeon: Adam Morton MD;  Location:  GI    ESOPHAGOSCOPY, GASTROSCOPY, DUODENOSCOPY (EGD), COMBINED N/A 02/13/2020    Procedure: ESOPHAGOGASTRODUODENOSCOPY (EGD);  Surgeon: Santi Rosas MD;  Location:  GI    EXCISE MASS ABDOMEN N/A 07/13/2023    Procedure: Laparoscopic lysis of adhesions, laparoscopic resection of abdominal mass;  Surgeon: Ruiz Chu MD;  Location:  OR    HEAD & NECK SURGERY      12/2017 at [facility].     IMPLANT GOLD WEIGHT EYELID Right 11/16/2017    Procedure: IMPLANT WEIGHT EYELID;  Right Upper Eyelid Weight, right tarsal strip lower eyelid;  Surgeon: Milana Malave MD;  Location:  OR    IR CHEMO EMBOLIZATION  01/22/2019    KNEE SURGERY Left     ORTHOPEDIC SURGERY      PAROTIDECTOMY, RADICAL NECK DISSECTION Right 11/02/2017    Procedure: PAROTIDECTOMY, RADICAL NECK DISSECTION;  Right Superfacial Parotidectomy , Facial nerve repair. with [facility] facial nerve monitor.;  Surgeon: Asiya Morgan MD;  Location: [facility] OR    PHACOEMULSIFICATION  CLEAR CORNEA WITH STANDARD INTRAOCULAR LENS IMPLANT Right 2023    Procedure: PHACOEMULSIFICATION, COMPLEX CATARACT, WITH INTRAOCULAR LENS IMPLANT WITH TRYPAN RIGHT EYE;  Surgeon: Enriqueta Martin MD;  Location: [facility] OR    PHACOEMULSIFICATION WITH STANDARD INTRAOCULAR LENS IMPLANT Left 2023    Procedure: PHACOEMULSIFICATION, COMPLEX CATARACT, WITH STANDARD INTRAOCULAR LENS IMPLANT INSERTION LEFT EYE;  Surgeon: Enriqueta Martin MD;  Location: [facility] OR    PICC INSERTION Left 2017    4fr SL BioFlo PICC, 44cm in the L basilic vein w/ tip in the low SVC    [facility] CARDIAC SURG PROCEDURE UNLISTED  2021    Heart graph performed    [facility] STOMACH SURGERY PROCEDURE UNLISTED  19    Liver transplant    RETURN LIVER TRANSPLANT N/A 2019    Procedure: Exploratory laparotomy, hematoma evacuation, abdominal washout;  Surgeon: Александр Ramos MD;  Location: [facility] OR    TRANSPLANT LIVER RECIPIENT  DONOR N/A 2019    Procedure: TRANSPLANT, LIVER, RECIPIENT,  DONOR;  Surgeon: Александр Ramos MD;  Location: [facility] OR    VASCULAR SURGERY       Social History     Socioeconomic History    Marital status:      Spouse name: Not on file    Number of children: Not on file    Years of education: Not on file    Highest education level: Bachelor's degree (e.g., BA, AB, BS)   Occupational History    Not on file   Tobacco Use    Smoking status: Former     Types: Dip, chew, snus or snuff     Passive exposure: Past    Smokeless tobacco: Former     Types: Chew     Quit date: 10/31/2017    Tobacco comments:     Quit [place] after Doctor told me in 2017 that if I didn't I would   Vaping Use    Vaping status: Never Used   Substance and Sexual Activity    Alcohol use: No     Comment: quit 1996    Drug use: No    Sexual activity: Not Currently     Partners: Female     Birth control/protection: Condom   Other Topics Concern    Parent/sibling w/ CABG, MI or angioplasty before 65F 55M? No    Social History Narrative    Prior , [facility]     Social Drivers of Health     Financial Resource Strain: Low Risk  (1/23/2024)    Financial Resource Strain     Within the past 12 months, have you or your family members you live with been unable to get utilities (heat, electricity) when it was really needed?: No   Food Insecurity: Low Risk  (1/23/2024)    Food Insecurity     Within the past 12 months, did you worry that your food would run out before you got money to buy more?: No     Within the past 12 months, did the food you bought just not last and you didn t have money to get more?: No   Transportation Needs: Low Risk  (1/23/2024)    Transportation Needs     Within the past 12 months, has lack of transportation kept you from medical appointments, getting your medicines, non-medical meetings or appointments, work, or from getting things that you need?: No   Physical Activity: Insufficiently Active (10/13/2020)    Exercise Vital Sign     Days of Exercise per Week: 1 day     Minutes of Exercise per Session: 60 min   Stress: Stress Concern Present (10/13/2020)    [employer] - Occupational Stress Questionnaire     Feeling of Stress : To some extent   Social Connections: Socially Isolated (10/13/2020)    Social Connection and Isolation Panel [NHANES]     Frequency of Communication with Friends and Family: Once a week     Frequency of Social Gatherings with Friends and Family: Once a week     Attends Protestant Services: Never     Active Member of Clubs or Organizations: No     Attends Club or Organization Meetings: Never     Marital Status:    Interpersonal Safety: Low Risk  (6/5/2025)    Interpersonal Safety     Do you feel physically and emotionally safe where you currently live?: Yes     Within the past 12 months, have you been hit, slapped, kicked or otherwise physically hurt by someone?: No     Within the past 12 months, have you been humiliated or emotionally  "abused in other ways by your partner or ex-partner?: No   Housing Stability: Low Risk  (2024)    Housing Stability     Do you have housing? : Yes     Are you worried about losing your housing?: No     Family History   Problem Relation Age of Onset    Skin Cancer Mother     Cancer Mother         mastectomy    Diabetes Mother          3/2016    Cerebrovascular Disease Mother         Passed away in Feb of this year, 80 years old.    Thyroid Disease Mother     Depression Mother     Breast Cancer Mother     Obesity Mother         280 pounds  from this and complications from D    Pancreatic Cancer Father 60        Currently in Remission    Prostate Cancer Father         Currently in Remission    Macular Degeneration Father     Glaucoma Father     Skin Cancer Father     Coronary Artery Disease Father         Started in his 70's  at 88 in [place]     Cancer Father         Ct scan every 3 months    Heart Failure Father         Has congestive heart failure since    Melanoma Father         Sun worshiper    Thyroid Disease Sister     Depression Sister     Asthma Sister     Sleep Apnea Brother     Colorectal Cancer Maternal Grandmother     Cancer Maternal Grandmother     Substance Abuse Maternal Grandmother         Alcohol    Prostate Cancer Maternal Grandfather     Substance Abuse Maternal Grandfather         Alcohol    Colorectal Cancer Paternal Grandmother     Asthma Sister         Had since birth    Cancer Paternal Grandfather     Hyperlipidemia Brother         Off Simivastan - Normal CL since he started exer.    Obesity Sister         5'1\" 185 pounds    Depression Brother     Liver Disease No family hx of     Anesthesia Reaction No family hx of     Deep Vein Thrombosis (DVT) No family hx of     Colon Cancer No family hx of         In remision Did not die from cancer           Lab Results   Component Value Date    A1C 5.7 2025    A1C 5.6 2024    A1C 5.6 2024    A1C 5.6 2024    " "A1C 5.7 12/18/2023    A1C 6.3 06/14/2023    A1C 6.0 12/30/2020    A1C 6.3 06/13/2020    A1C 6.6 08/08/2018    A1C 6.5 06/09/2017    A1C 7.8 10/25/2016       Lab Results   Component Value Date    WBC 4.7 07/03/2025    WBC 4.4 06/28/2021     Lab Results   Component Value Date    RBC 2.92 07/03/2025    RBC 2.35 06/28/2021     Lab Results   Component Value Date    HGB 8.8 07/03/2025    HGB 7.3 06/28/2021     Lab Results   Component Value Date    HCT 28.3 07/03/2025    HCT 22.6 06/28/2021     No components found for: \"MCT\"  Lab Results   Component Value Date    MCV 97 07/03/2025    MCV 96 06/28/2021     Lab Results   Component Value Date    MCH 30.1 07/03/2025    MCH 31.1 06/28/2021     Lab Results   Component Value Date    MCHC 31.1 07/03/2025    MCHC 32.3 06/28/2021     Lab Results   Component Value Date    RDW 14.4 07/03/2025    RDW 15.1 06/28/2021     Lab Results   Component Value Date     07/03/2025     06/28/2021     Last Comprehensive Metabolic Panel:  Lab Results   Component Value Date     07/03/2025     07/03/2025    POTASSIUM 4.9 07/03/2025    POTASSIUM 4.8 07/03/2025    CHLORIDE 106 07/03/2025    CHLORIDE 106 07/03/2025    CO2 17 (L) 07/03/2025    CO2 17 (L) 07/03/2025    ANIONGAP 13 07/03/2025    ANIONGAP 13 07/03/2025     (H) 07/03/2025     (H) 07/03/2025    BUN 58.2 (H) 07/03/2025    BUN 59.1 (H) 07/03/2025    CR 2.37 (H) 07/03/2025    CR 2.40 (H) 07/03/2025    GFRESTIMATED 30 (L) 07/03/2025    GFRESTIMATED 30 (L) 07/03/2025    DEBORAH 8.9 07/03/2025    DEBORAH 8.8 07/03/2025     Lab Results   Component Value Date    AST 22 07/03/2025    AST 9 06/28/2021     Lab Results   Component Value Date    ALT 19 07/03/2025    ALT 20 06/28/2021     No results found for: \"BILICONJ\"   Lab Results   Component Value Date    BILITOTAL 0.3 07/03/2025    BILITOTAL 0.5 06/28/2021     Lab Results   Component Value Date    ALBUMIN 4.3 07/03/2025    ALBUMIN 4.3 07/03/2025    ALBUMIN 4.3 07/20/2022 "    ALBUMIN 4.0 06/28/2021     Lab Results   Component Value Date    PROTTOTAL 6.8 07/03/2025    PROTTOTAL 7.4 06/28/2021      Lab Results   Component Value Date    ALKPHOS 70 07/03/2025    ALKPHOS 98 06/28/2021             Subjective findings- 60-year-old returns clinic for Diabetes with peripheral Neuropathy and foot deformity and callus.  Relates he usually wears his Diabetic shoes and insoles.  Relates he feels his feet are doing well and he is walking daily.  Relates to no injuries, no ulcers, no sores since we seen him last.  Relates he needs his toenails cut.  Relates he has numbness tingling and neuropathy in his feet.     Objective findings- DP and PT are 2 out of 4 bilaterally.  Has decreased hair growth bilaterally.  Has dorsally contracted digits 2 through 5 bilaterally.  Has functional hallux limitus bilaterally.  Has minimal hyperkeratotic tissue buildup plantar first and fifth MPJ bilaterally.  No tendon voids bilaterally.  There is no erythema, no edema, no drainage, no odor, no calor bilaterally.     Assessment and plan- Onychauxis with Onychocryptosis bilaterally.  Diabetes with peripheral Neuropathy.  Hammertoes and functional Hallux Limitus bilaterally.  Tylomas bilaterally are doing well.  Diagnosis and treatment options discussed with the patient.  All the toenails were reduced bilaterally upon consent.  Continue the Diabetic shoes.  Previous notes reviewed.  Return to clinic and see me in 2 to 3 months.                                                           Moderate level of medical decision making.

## 2025-07-14 NOTE — LETTER
7/14/2025      Frandy Workman  [address] [facility] [address]      Dear Colleague,    Thank you for referring your patient, Frandy Workman, to the [facility] [place]. Please see a copy of my visit note below.    Past Medical History:   Diagnosis Date    Anemia 2013    Arthritis     Bipolar disorder (H) 1989    Noted by  when dealing with depression    Bleeding disorder 2023    Take medicine daily complications from cancer    BPH (benign prostatic hyperplasia)     CAD (coronary artery disease) 04/01/2019    Cholelithiasis     Conductive hearing loss 08/16/2017    Depressive disorder 1986    Suffer effects throughout life    Diabetes mellitus, type 2 (H) 2002    Insulin Dependent since     Gastroesophageal reflux disease 12/01/2014    HCC (hepatocellular carcinoma) (H) 01/22/2019    History of blood transfusion 2019    Had blood transfussion until Dec 2022    History of diabetic retinopathy 07/2018    HTN (hypertension) 11/20/2019    Hyperlipidemia     Liver cirrhosis secondary to ESTRADA (H)     Liver failure (H) 12/1/2014    On thre transplant list since 5.2013 - Off list July 16    Liver transplanted (H) 11/11/2019    Major depression 1988    Over worked and disabled for a month    Palpitations 2010    Getting some as walking increases avg 7,600 per datr    Portal vein thrombosis 04/11/2019    Renal failure 12/1/2014    Was in comma from liver disease    SCC (squamous cell carcinoma) 02/15/2023    02/15/23:  Left temporal scalp    Shortness of breath 1/24/19    Had problems after liver albation    Type II diabetes mellitus (H)      Patient Active Problem List   Diagnosis    Bipolar affective disorder in remission    Esophageal varices determined by endoscopy (H)    Erectile dysfunction due to diseases classified elsewhere    HCC (hepatocellular carcinoma) (H)    Equivocal stress echocardiogram    Type 2 diabetes mellitus with mild nonproliferative retinopathy of both  eyes without macular edema, unspecified whether long term insulin use (H)    Status post coronary angiogram    CAD (coronary artery disease)    Liver transplant recipient (H)    Status post liver transplantation (H)    Immunosuppressed status    Benign essential hypertension    Anemia of chronic renal failure, stage 3a (H)    History of coronary artery disease    Dyslipidemia    Excessive sweating    Stage 3b chronic kidney disease (H)    Anemia of chronic renal failure, stage 3b (H)    NSTEMI (non-ST elevated myocardial infarction) (H)    Vitreous hemorrhage of left eye (H)    Proliferative diabetic retinopathy of both eyes associated with type 2 diabetes mellitus, unspecified proliferative retinopathy type (H)    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)    Hyperkalemia    ARIELA (obstructive sleep apnea)    History of nonmelanoma skin cancer    Neoplasm of unspecified behavior of bone, soft tissue, and skin    SCC (squamous cell carcinoma)    AK (actinic keratosis)     Past Surgical History:   Procedure Laterality Date    ABDOMEN SURGERY  11/11/2019    Had Liver Transplant    BIOPSY  12/2022    Had biopsy done will have additional procedure 2/15/2023    BYPASS GRAFT ARTERY CORONARY N/A 07/14/2021    Procedure: median sternotomy, on cardiopulmonary bypass, CORONARY ARTERY BYPASS GRAFT (CABG) x2 with left greater saphenous vein endoscopic harvest and left internal mammery artery harvest;  Surgeon: Tom Zapata MD;  Location:  OR    CARDIAC SURGERY  7/2021    Heart Graph. and bypass surgery    CHOLECYSTECTOMY  11/11/2022    Removed with Liver Transplant    COLONOSCOPY      2015    COLONOSCOPY N/A 12/06/2019    Procedure: COLONOSCOPY, WITH POLYPECTOMY AND BIOPSY;  Surgeon: Adam Morton MD;  Location:  GI    CV CENTRAL VENOUS CATHETER PLACEMENT N/A 07/12/2021    Procedure: Central Venous Catheter Placement;  Surgeon: Fermin Polanco MD;  Location:  HEART CARDIAC CATH LAB    CV  CORONARY ANGIOGRAM N/A 07/12/2021    Procedure: Coronary Angiogram;  Surgeon: Fermin Polanco MD;  Location:  HEART CARDIAC CATH LAB    CV HEART CATHETERIZATION WITH POSSIBLE INTERVENTION N/A 02/26/2019    Procedure: CORS;  Surgeon: Jagdish Hoyt MD;  Location: [facility] CARDIAC CATH LAB    CV INTRA AORTIC BALLOON N/A 07/12/2021    Procedure: Intra Aortic Balloon Pump Insertion;  Surgeon: Fermin Polanco MD;  Location:  HEART CARDIAC CATH LAB    CYSTOSCOPY      ESOPHAGOSCOPY, GASTROSCOPY, DUODENOSCOPY (EGD), COMBINED N/A 11/17/2016    Procedure: COMBINED ESOPHAGOSCOPY, GASTROSCOPY, DUODENOSCOPY (EGD);  Surgeon: Santi Rosas MD;  Location:  GI    ESOPHAGOSCOPY, GASTROSCOPY, DUODENOSCOPY (EGD), COMBINED N/A 11/17/2017    Procedure: COMBINED ESOPHAGOSCOPY, GASTROSCOPY, DUODENOSCOPY (EGD);  EGD;  Surgeon: Santi Rosas MD;  Location:  GI    ESOPHAGOSCOPY, GASTROSCOPY, DUODENOSCOPY (EGD), COMBINED N/A 12/28/2018    Procedure: EGD;  Surgeon: Santi Rosas MD;  Location:  OR    ESOPHAGOSCOPY, GASTROSCOPY, DUODENOSCOPY (EGD), COMBINED N/A 12/06/2019    Procedure: ESOPHAGOGASTRODUODENOSCOPY, WITH BIOPSY;  Surgeon: Adam Morton MD;  Location:  GI    ESOPHAGOSCOPY, GASTROSCOPY, DUODENOSCOPY (EGD), COMBINED N/A 02/13/2020    Procedure: ESOPHAGOGASTRODUODENOSCOPY (EGD);  Surgeon: Santi Rosas MD;  Location:  GI    EXCISE MASS ABDOMEN N/A 07/13/2023    Procedure: Laparoscopic lysis of adhesions, laparoscopic resection of abdominal mass;  Surgeon: Ruiz Chu MD;  Location:  OR    HEAD & NECK SURGERY      12/2017 at [facility].     IMPLANT GOLD WEIGHT EYELID Right 11/16/2017    Procedure: IMPLANT WEIGHT EYELID;  Right Upper Eyelid Weight, right tarsal strip lower eyelid;  Surgeon: Milana Malave MD;  Location: [facility] OR    IR CHEMO EMBOLIZATION  01/22/2019    KNEE SURGERY Left     ORTHOPEDIC SURGERY      PAROTIDECTOMY, RADICAL  NECK DISSECTION Right 2017    Procedure: PAROTIDECTOMY, RADICAL NECK DISSECTION;  Right Superfacial Parotidectomy , Facial nerve repair. with [facility] facial nerve monitor.;  Surgeon: Asiya Morgan MD;  Location: [facility] OR    PHACOEMULSIFICATION CLEAR CORNEA WITH STANDARD INTRAOCULAR LENS IMPLANT Right 2023    Procedure: PHACOEMULSIFICATION, COMPLEX CATARACT, WITH INTRAOCULAR LENS IMPLANT WITH TRYPAN RIGHT EYE;  Surgeon: Enriqueta Martin MD;  Location: [facility] OR    PHACOEMULSIFICATION WITH STANDARD INTRAOCULAR LENS IMPLANT Left 2023    Procedure: PHACOEMULSIFICATION, COMPLEX CATARACT, WITH STANDARD INTRAOCULAR LENS IMPLANT INSERTION LEFT EYE;  Surgeon: Enriqueta Martin MD;  Location: [facility] OR    PICC INSERTION Left 2017    4fr SL BioFlo PICC, 44cm in the L basilic vein w/ tip in the low SVC    ID CARDIAC SURG PROCEDURE UNLISTED  2021    Heart graph performed    ID STOMACH SURGERY PROCEDURE UNLISTED  19    Liver transplant    RETURN LIVER TRANSPLANT N/A 2019    Procedure: Exploratory laparotomy, hematoma evacuation, abdominal washout;  Surgeon: Александр Ramos MD;  Location: [facility] OR    TRANSPLANT LIVER RECIPIENT  DONOR N/A 2019    Procedure: TRANSPLANT, LIVER, RECIPIENT,  DONOR;  Surgeon: Александр Ramos MD;  Location: [facility] OR    VASCULAR SURGERY       Social History     Socioeconomic History    Marital status:      Spouse name: Not on file    Number of children: Not on file    Years of education: Not on file    Highest education level: Bachelor's degree (e.g., BA, AB, BS)   Occupational History    Not on file   Tobacco Use    Smoking status: Former     Types: Dip, chew, snus or snuff     Passive exposure: Past    Smokeless tobacco: Former     Types: Chew     Quit date: 10/31/2017    Tobacco comments:     Quit [place] after Doctor told me in 2017 that if I didn't I would   Vaping Use    Vaping status: Never Used   Substance and Sexual  Activity    Alcohol use: No     Comment: quit Sept. 1996    Drug use: No    Sexual activity: Not Currently     Partners: Female     Birth control/protection: Condom   Other Topics Concern    Parent/sibling w/ CABG, MI or angioplasty before 65F 55M? No   Social History Narrative    Prior , [facility]     Social Drivers of Health     Financial Resource Strain: Low Risk  (1/23/2024)    Financial Resource Strain     Within the past 12 months, have you or your family members you live with been unable to get utilities (heat, electricity) when it was really needed?: No   Food Insecurity: Low Risk  (1/23/2024)    Food Insecurity     Within the past 12 months, did you worry that your food would run out before you got money to buy more?: No     Within the past 12 months, did the food you bought just not last and you didn t have money to get more?: No   Transportation Needs: Low Risk  (1/23/2024)    Transportation Needs     Within the past 12 months, has lack of transportation kept you from medical appointments, getting your medicines, non-medical meetings or appointments, work, or from getting things that you need?: No   Physical Activity: Insufficiently Active (10/13/2020)    Exercise Vital Sign     Days of Exercise per Week: 1 day     Minutes of Exercise per Session: 60 min   Stress: Stress Concern Present (10/13/2020)    Citizen of [place] of Occupational Health - Occupational Stress Questionnaire     Feeling of Stress : To some extent   Social Connections: Socially Isolated (10/13/2020)    Social Connection and Isolation Panel [NHANES]     Frequency of Communication with Friends and Family: Once a week     Frequency of Social Gatherings with Friends and Family: Once a week     Attends Worship Services: Never     Active Member of Clubs or Organizations: No     Attends Club or Organization Meetings: Never     Marital Status:    Interpersonal Safety: Low Risk  (6/5/2025)    Interpersonal Safety     Do you  "feel physically and emotionally safe where you currently live?: Yes     Within the past 12 months, have you been hit, slapped, kicked or otherwise physically hurt by someone?: No     Within the past 12 months, have you been humiliated or emotionally abused in other ways by your partner or ex-partner?: No   Housing Stability: Low Risk  (2024)    Housing Stability     Do you have housing? : Yes     Are you worried about losing your housing?: No     Family History   Problem Relation Age of Onset    Skin Cancer Mother     Cancer Mother         mastectomy    Diabetes Mother          3/2016    Cerebrovascular Disease Mother         Passed away in Feb of this year, 80 years old.    Thyroid Disease Mother     Depression Mother     Breast Cancer Mother     Obesity Mother         280 pounds  from this and complications from D    Pancreatic Cancer Father 60        Currently in Remission    Prostate Cancer Father         Currently in Remission    Macular Degeneration Father     Glaucoma Father     Skin Cancer Father     Coronary Artery Disease Father         Started in his 70's  at 88 in Octobe2023    Cancer Father         Ct scan every 3 months    Heart Failure Father         Has congestive heart failure since    Melanoma Father         Sun worshiper    Thyroid Disease Sister     Depression Sister     Asthma Sister     Sleep Apnea Brother     Colorectal Cancer Maternal Grandmother     Cancer Maternal Grandmother     Substance Abuse Maternal Grandmother         Alcohol    Prostate Cancer Maternal Grandfather     Substance Abuse Maternal Grandfather         Alcohol    Colorectal Cancer Paternal Grandmother     Asthma Sister         Had since birth    Cancer Paternal Grandfather     Hyperlipidemia Brother         Off Simivastan - Normal CL since he started exer.    Obesity Sister         5'1\" 185 pounds    Depression Brother     Liver Disease No family hx of     Anesthesia Reaction No family hx of     Deep " "Vein Thrombosis (DVT) No family hx of     Colon Cancer No family hx of         In remision Did not die from cancer           Lab Results   Component Value Date    A1C 5.7 06/17/2025    A1C 5.6 12/04/2024    A1C 5.6 09/05/2024    A1C 5.6 05/13/2024    A1C 5.7 12/18/2023    A1C 6.3 06/14/2023    A1C 6.0 12/30/2020    A1C 6.3 06/13/2020    A1C 6.6 08/08/2018    A1C 6.5 06/09/2017    A1C 7.8 10/25/2016       Lab Results   Component Value Date    WBC 4.7 07/03/2025    WBC 4.4 06/28/2021     Lab Results   Component Value Date    RBC 2.92 07/03/2025    RBC 2.35 06/28/2021     Lab Results   Component Value Date    HGB 8.8 07/03/2025    HGB 7.3 06/28/2021     Lab Results   Component Value Date    HCT 28.3 07/03/2025    HCT 22.6 06/28/2021     No components found for: \"MCT\"  Lab Results   Component Value Date    MCV 97 07/03/2025    MCV 96 06/28/2021     Lab Results   Component Value Date    MCH 30.1 07/03/2025    MCH 31.1 06/28/2021     Lab Results   Component Value Date    MCHC 31.1 07/03/2025    MCHC 32.3 06/28/2021     Lab Results   Component Value Date    RDW 14.4 07/03/2025    RDW 15.1 06/28/2021     Lab Results   Component Value Date     07/03/2025     06/28/2021     Last Comprehensive Metabolic Panel:  Lab Results   Component Value Date     07/03/2025     07/03/2025    POTASSIUM 4.9 07/03/2025    POTASSIUM 4.8 07/03/2025    CHLORIDE 106 07/03/2025    CHLORIDE 106 07/03/2025    CO2 17 (L) 07/03/2025    CO2 17 (L) 07/03/2025    ANIONGAP 13 07/03/2025    ANIONGAP 13 07/03/2025     (H) 07/03/2025     (H) 07/03/2025    BUN 58.2 (H) 07/03/2025    BUN 59.1 (H) 07/03/2025    CR 2.37 (H) 07/03/2025    CR 2.40 (H) 07/03/2025    GFRESTIMATED 30 (L) 07/03/2025    GFRESTIMATED 30 (L) 07/03/2025    DEBORAH 8.9 07/03/2025    DEBORAH 8.8 07/03/2025     Lab Results   Component Value Date    AST 22 07/03/2025    AST 9 06/28/2021     Lab Results   Component Value Date    ALT 19 07/03/2025    ALT 20 " "06/28/2021     No results found for: \"BILICONJ\"   Lab Results   Component Value Date    BILITOTAL 0.3 07/03/2025    BILITOTAL 0.5 06/28/2021     Lab Results   Component Value Date    ALBUMIN 4.3 07/03/2025    ALBUMIN 4.3 07/03/2025    ALBUMIN 4.3 07/20/2022    ALBUMIN 4.0 06/28/2021     Lab Results   Component Value Date    PROTTOTAL 6.8 07/03/2025    PROTTOTAL 7.4 06/28/2021      Lab Results   Component Value Date    ALKPHOS 70 07/03/2025    ALKPHOS 98 06/28/2021     Subjective findings- 60-year-old returns clinic for Diabetes with peripheral Neuropathy and foot deformity and callus.  Relates he usually wears his Diabetic shoes and insoles.  Relates he feels his feet are doing well and he is walking daily.  Relates to no injuries, no ulcers, no sores since we seen him last.  Relates he needs his toenails cut.  Relates he has numbness tingling and neuropathy in his feet.     Objective findings- DP and PT are 2 out of 4 bilaterally.  Has decreased hair growth bilaterally.  Has dorsally contracted digits 2 through 5 bilaterally.  Has functional hallux limitus bilaterally.  Has minimal hyperkeratotic tissue buildup plantar first and fifth MPJ bilaterally.  No tendon voids bilaterally.  There is no erythema, no edema, no drainage, no odor, no calor bilaterally.     Assessment and plan- Onychauxis with Onychocryptosis bilaterally.  Diabetes with peripheral Neuropathy.  Hammertoes and functional Hallux Limitus bilaterally.  Tylomas bilaterally are doing well.  Diagnosis and treatment options discussed with the patient.  All the toenails were reduced bilaterally upon consent.  Continue the Diabetic shoes.  Previous notes reviewed.  Return to clinic and see me in 2 to 3 months.     Moderate level of medical decision making.      Again, thank you for allowing me to participate in the care of your patient.      Sincerely,    Lamin Gonzalez DPM    Electronically signed  "

## 2025-07-15 NOTE — TELEPHONE ENCOUNTER
Loperamide 2mg capsule  Last prescribing provider: Dr. Ramesh     Last clinic visit date: 5/29/25 w/ Dr. Villarreal    Recommendations for requested medication (if none, N/A):  NA    Any other pertinent information (if none, N/A): NA    Refilled: Y/N, if NO, why?

## 2025-07-16 RX ORDER — LOPERAMIDE HYDROCHLORIDE 2 MG/1
CAPSULE ORAL
Qty: 120 CAPSULE | Refills: 2 | Status: SHIPPED | OUTPATIENT
Start: 2025-07-16

## 2025-07-19 ENCOUNTER — HEALTH MAINTENANCE LETTER (OUTPATIENT)
Age: 60
End: 2025-07-19

## 2025-07-28 ENCOUNTER — LAB (OUTPATIENT)
Dept: LAB | Facility: CLINIC | Age: 60
End: 2025-07-28
Payer: MEDICARE

## 2025-07-28 ENCOUNTER — ANCILLARY PROCEDURE (OUTPATIENT)
Dept: CT IMAGING | Facility: CLINIC | Age: 60
End: 2025-07-28
Attending: INTERNAL MEDICINE
Payer: MEDICARE

## 2025-07-28 DIAGNOSIS — Z94.4 LIVER REPLACED BY TRANSPLANT (H): ICD-10-CM

## 2025-07-28 DIAGNOSIS — C78.6 MALIGNANT NEOPLASM METASTATIC TO PERITONEUM (H): ICD-10-CM

## 2025-07-28 DIAGNOSIS — Z94.4 LIVER TRANSPLANT RECIPIENT (H): ICD-10-CM

## 2025-07-28 DIAGNOSIS — Z79.899 ENCOUNTER FOR LONG-TERM (CURRENT) USE OF MEDICATIONS: ICD-10-CM

## 2025-07-28 DIAGNOSIS — N18.32 STAGE 3B CHRONIC KIDNEY DISEASE (H): ICD-10-CM

## 2025-07-28 DIAGNOSIS — C22.0 HCC (HEPATOCELLULAR CARCINOMA) (H): ICD-10-CM

## 2025-07-28 LAB
AFP SERPL-MCNC: 11.2 NG/ML
ALBUMIN MFR UR ELPH: 65.6 MG/DL
ALBUMIN SERPL BCG-MCNC: 4.3 G/DL (ref 3.5–5.2)
ALBUMIN UR-MCNC: 50 MG/DL
ALP SERPL-CCNC: 70 U/L (ref 40–150)
ALT SERPL W P-5'-P-CCNC: 28 U/L (ref 0–70)
ANION GAP SERPL CALCULATED.3IONS-SCNC: 12 MMOL/L (ref 7–15)
APPEARANCE UR: CLEAR
AST SERPL W P-5'-P-CCNC: 28 U/L (ref 0–45)
BASOPHILS # BLD AUTO: 0 10E3/UL (ref 0–0.2)
BASOPHILS NFR BLD AUTO: 0 %
BILIRUB SERPL-MCNC: 0.3 MG/DL
BILIRUB UR QL STRIP: NEGATIVE
BUN SERPL-MCNC: 42 MG/DL (ref 8–23)
CALCIUM SERPL-MCNC: 9.7 MG/DL (ref 8.8–10.4)
CHLORIDE SERPL-SCNC: 104 MMOL/L (ref 98–107)
CHOLEST SERPL-MCNC: 142 MG/DL
COLOR UR AUTO: ABNORMAL
CREAT BLD-MCNC: 2.2 MG/DL (ref 0.7–1.2)
CREAT SERPL-MCNC: 2.06 MG/DL (ref 0.67–1.17)
CREAT UR-MCNC: 57.1 MG/DL
EGFRCR SERPLBLD CKD-EPI 2021: 33 ML/MIN/1.73M2
EGFRCR SERPLBLD CKD-EPI 2021: 36 ML/MIN/1.73M2
EOSINOPHIL # BLD AUTO: 0.1 10E3/UL (ref 0–0.7)
EOSINOPHIL NFR BLD AUTO: 2 %
ERYTHROCYTE [DISTWIDTH] IN BLOOD BY AUTOMATED COUNT: 14.6 % (ref 10–15)
FASTING STATUS PATIENT QL REPORTED: YES
FASTING STATUS PATIENT QL REPORTED: YES
GLUCOSE SERPL-MCNC: 108 MG/DL (ref 70–99)
GLUCOSE UR STRIP-MCNC: 300 MG/DL
HCO3 SERPL-SCNC: 22 MMOL/L (ref 22–29)
HCT VFR BLD AUTO: 26.7 % (ref 40–53)
HDLC SERPL-MCNC: 40 MG/DL
HGB BLD-MCNC: 8.4 G/DL (ref 13.3–17.7)
HGB UR QL STRIP: NEGATIVE
IMM GRANULOCYTES # BLD: 0 10E3/UL
IMM GRANULOCYTES NFR BLD: 1 %
KETONES UR STRIP-MCNC: NEGATIVE MG/DL
LDLC SERPL CALC-MCNC: 46 MG/DL
LEUKOCYTE ESTERASE UR QL STRIP: NEGATIVE
LYMPHOCYTES # BLD AUTO: 1 10E3/UL (ref 0.8–5.3)
LYMPHOCYTES NFR BLD AUTO: 21 %
MAGNESIUM SERPL-MCNC: 1.6 MG/DL (ref 1.7–2.3)
MCH RBC QN AUTO: 30.2 PG (ref 26.5–33)
MCHC RBC AUTO-ENTMCNC: 31.5 G/DL (ref 31.5–36.5)
MCV RBC AUTO: 96 FL (ref 78–100)
MONOCYTES # BLD AUTO: 0.4 10E3/UL (ref 0–1.3)
MONOCYTES NFR BLD AUTO: 9 %
MUCOUS THREADS #/AREA URNS LPF: PRESENT /LPF
NEUTROPHILS # BLD AUTO: 3.2 10E3/UL (ref 1.6–8.3)
NEUTROPHILS NFR BLD AUTO: 66 %
NITRATE UR QL: NEGATIVE
NONHDLC SERPL-MCNC: 102 MG/DL
NRBC # BLD AUTO: 0 10E3/UL
NRBC BLD AUTO-RTO: 0 /100
PATH REPORT.COMMENTS IMP SPEC: NORMAL
PATH REPORT.FINAL DX SPEC: NORMAL
PATH REPORT.GROSS SPEC: NORMAL
PATH REPORT.MICROSCOPIC SPEC OTHER STN: NORMAL
PATH REPORT.RELEVANT HX SPEC: NORMAL
PH UR STRIP: 5 [PH] (ref 5–7)
PHOSPHATE SERPL-MCNC: 3.9 MG/DL (ref 2.5–4.5)
PLATELET # BLD AUTO: 140 10E3/UL (ref 150–450)
POTASSIUM SERPL-SCNC: 4.9 MMOL/L (ref 3.4–5.3)
PROT SERPL-MCNC: 6.8 G/DL (ref 6.4–8.3)
PROT/CREAT 24H UR: 1.15 MG/MG CR (ref 0–0.2)
RBC # BLD AUTO: 2.78 10E6/UL (ref 4.4–5.9)
RBC URINE: 1 /HPF
SODIUM SERPL-SCNC: 138 MMOL/L (ref 135–145)
SP GR UR STRIP: 1.01 (ref 1–1.03)
T4 FREE SERPL-MCNC: 1.19 NG/DL (ref 0.9–1.7)
TRIGL SERPL-MCNC: 279 MG/DL
TSH SERPL DL<=0.005 MIU/L-ACNC: 5.35 UIU/ML (ref 0.3–4.2)
UROBILINOGEN UR STRIP-MCNC: NORMAL MG/DL
WBC # BLD AUTO: 4.8 10E3/UL (ref 4–11)
WBC URINE: <1 /HPF

## 2025-07-28 PROCEDURE — 84443 ASSAY THYROID STIM HORMONE: CPT | Performed by: PATHOLOGY

## 2025-07-28 PROCEDURE — 80053 COMPREHEN METABOLIC PANEL: CPT | Performed by: PATHOLOGY

## 2025-07-28 PROCEDURE — 84156 ASSAY OF PROTEIN URINE: CPT | Performed by: PATHOLOGY

## 2025-07-28 PROCEDURE — 85025 COMPLETE CBC W/AUTO DIFF WBC: CPT | Performed by: PATHOLOGY

## 2025-07-28 PROCEDURE — 74177 CT ABD & PELVIS W/CONTRAST: CPT | Performed by: RADIOLOGY

## 2025-07-28 PROCEDURE — 81001 URINALYSIS AUTO W/SCOPE: CPT | Performed by: PATHOLOGY

## 2025-07-28 PROCEDURE — 84100 ASSAY OF PHOSPHORUS: CPT | Performed by: PATHOLOGY

## 2025-07-28 PROCEDURE — 99000 SPECIMEN HANDLING OFFICE-LAB: CPT | Performed by: PATHOLOGY

## 2025-07-28 PROCEDURE — 71260 CT THORAX DX C+: CPT | Performed by: RADIOLOGY

## 2025-07-28 PROCEDURE — 83735 ASSAY OF MAGNESIUM: CPT | Performed by: PATHOLOGY

## 2025-07-28 PROCEDURE — 84439 ASSAY OF FREE THYROXINE: CPT | Performed by: PATHOLOGY

## 2025-07-28 PROCEDURE — 36415 COLL VENOUS BLD VENIPUNCTURE: CPT | Performed by: PATHOLOGY

## 2025-07-28 PROCEDURE — 82105 ALPHA-FETOPROTEIN SERUM: CPT | Performed by: INTERNAL MEDICINE

## 2025-07-28 PROCEDURE — 80061 LIPID PANEL: CPT | Performed by: PATHOLOGY

## 2025-07-28 RX ORDER — IOPAMIDOL 755 MG/ML
85 INJECTION, SOLUTION INTRAVASCULAR ONCE
Status: COMPLETED | OUTPATIENT
Start: 2025-07-28 | End: 2025-07-28

## 2025-07-28 RX ADMIN — IOPAMIDOL 85 ML: 755 INJECTION, SOLUTION INTRAVASCULAR at 09:59

## 2025-07-28 NOTE — DISCHARGE INSTRUCTIONS

## 2025-07-31 ENCOUNTER — VIRTUAL VISIT (OUTPATIENT)
Dept: ONCOLOGY | Facility: CLINIC | Age: 60
End: 2025-07-31
Attending: INTERNAL MEDICINE
Payer: MEDICARE

## 2025-07-31 VITALS — BODY MASS INDEX: 24.73 KG/M2 | WEIGHT: 167 LBS | HEIGHT: 69 IN

## 2025-07-31 DIAGNOSIS — Z94.4 LIVER TRANSPLANT RECIPIENT (H): ICD-10-CM

## 2025-07-31 DIAGNOSIS — C22.0 HCC (HEPATOCELLULAR CARCINOMA) (H): Primary | ICD-10-CM

## 2025-07-31 DIAGNOSIS — C78.6 MALIGNANT NEOPLASM METASTATIC TO PERITONEUM (H): ICD-10-CM

## 2025-07-31 DIAGNOSIS — N18.31 ANEMIA OF CHRONIC RENAL FAILURE, STAGE 3A (H): ICD-10-CM

## 2025-07-31 DIAGNOSIS — D63.1 ANEMIA OF CHRONIC RENAL FAILURE, STAGE 3A (H): ICD-10-CM

## 2025-07-31 DIAGNOSIS — N18.32 STAGE 3B CHRONIC KIDNEY DISEASE (H): ICD-10-CM

## 2025-07-31 ASSESSMENT — PAIN SCALES - GENERAL: PAINLEVEL_OUTOF10: MODERATE PAIN (4)

## 2025-07-31 NOTE — NURSING NOTE
Patient reviewed medications and allergies in Mychart during e-check in and said everything looked correct.      Current patient location: [address] [facility] [address]    Is the patient currently in the state of [place]? YES    Visit mode: VIDEO    If the visit is dropped, the patient can be reconnected by:VIDEO VISIT: Text to cell phone:   Telephone Information:   Mobile [phone]       Will anyone else be joining the visit? NO  (If patient encounters technical issues they should call [phone]    Are changes needed to the allergy or medication list? No    Are refills needed on medications prescribed by this physician? NO    Rooming Documentation:  Questionnaire(s) completed    Reason for visit: RECHLASHAUN SILVAF

## 2025-07-31 NOTE — LETTER
7/31/2025         RE: Frandy Workman  [address] [facility] [address]      Virtual Visit Details    Type of service:  Video Visit   Video Start Time: 930  Video End Time:1000  Originating Location (pt. Location): Home  Distant Location (provider location):  Off-site  Platform used for Video Visit: Odette Dejesus returns today in follow-up of metastatic hepatocellular carcinoma in the setting of a prior liver transplant.    He is 60-year-old gentleman diagnosed 6 years ago with 2 lesions in his cirrhotic liver which were treated with TACE followed by a transplant in 2019.  Peritoneal metastases developed in 2023 and initiated therapy with sorafenib.  He did not tolerate full doses due to severe diarrhea and nausea but subsequently tolerated 200 mg twice per day with good control of his disease for several years.  4 months ago he had evidence of disease progression and change therapy to lenvatinib which initially required some treatment interruptions and dose adjustments for toxicity.  2 months ago he had stable to marginally progressive disease and is continued on therapy at 8 mg/day, and is returning today for his next response assessment.    He reports that he has been feeling better than he has in some time.  He is feeling more energetic and is starting to get more exercise again and has been out walking in going to the gym to lift weights every other day.  He is sleeping better and been working hard on drinking more water.  He has been able to keep his diarrhea controlled.  He has not been aware of any bleeding.    Via the video link he actually appears well, better than I have seen him in some time.    I personally viewed his CT scan and over the results with him.  That shows his peritoneal disease to be stable and I do not see any new sites of disease.    His lab work shows his AFP to have gone up a little bit at 11.  His electrolytes are normal and his chronic renal failure is stable with a  creatinine of 2.06.  His bilirubin, albumin and liver enzymes are normal.  He has stable mild thrombocytopenia consistent with his prior portal hypertension, and his hemoglobin is drifting down a little bit more now at 8.4 with an MCV of 96.    Assessment/plan: Metastatic hepatocellular carcinoma in the setting of a prior liver transplant currently with stable disease on reduced dose lenvatinib.  On his current dose he seems to be tolerating this reasonably well and we have not gotten into trouble again with his diarrhea or worsening of his renal failure.  He is getting a little more anemic which I suspect is largely a function of his renal disease and we will be checking an erythropoietin level.  I ask him to continue on with the same dose of lenvatinib.  We will reevaluate his disease status in another 2 to 3 months.  He will let us know in the interim if he develops any new or worsening symptoms.          Miguel Villarreal MD

## 2025-07-31 NOTE — PROGRESS NOTES
Virtual Visit Details    Type of service:  Video Visit   Video Start Time: 930  Video End Time:1000  Originating Location (pt. Location): Home  Distant Location (provider location):  Off-site  Platform used for Video Visit: [place]    Miller Dejesus returns today in follow-up of metastatic hepatocellular carcinoma in the setting of a prior liver transplant.    He is 60-year-old gentleman diagnosed 6 years ago with 2 lesions in his cirrhotic liver which were treated with TACE followed by a transplant in 2019.  Peritoneal metastases developed in 2023 and initiated therapy with sorafenib.  He did not tolerate full doses due to severe diarrhea and nausea but subsequently tolerated 200 mg twice per day with good control of his disease for several years.  4 months ago he had evidence of disease progression and change therapy to lenvatinib which initially required some treatment interruptions and dose adjustments for toxicity.  2 months ago he had stable to marginally progressive disease and is continued on therapy at 8 mg/day, and is returning today for his next response assessment.    He reports that he has been feeling better than he has in some time.  He is feeling more energetic and is starting to get more exercise again and has been out walking in going to the gym to lift weights every other day.  He is sleeping better and been working hard on drinking more water.  He has been able to keep his diarrhea controlled.  He has not been aware of any bleeding.    Via the video link he actually appears well, better than I have seen him in some time.    I personally viewed his CT scan and over the results with him.  That shows his peritoneal disease to be stable and I do not see any new sites of disease.    His lab work shows his AFP to have gone up a little bit at 11.  His electrolytes are normal and his chronic renal failure is stable with a creatinine of 2.06.  His bilirubin, albumin and liver enzymes are normal.  He has  stable mild thrombocytopenia consistent with his prior portal hypertension, and his hemoglobin is drifting down a little bit more now at 8.4 with an MCV of 96.    Assessment/plan: Metastatic hepatocellular carcinoma in the setting of a prior liver transplant currently with stable disease on reduced dose lenvatinib.  On his current dose he seems to be tolerating this reasonably well and we have not gotten into trouble again with his diarrhea or worsening of his renal failure.  He is getting a little more anemic which I suspect is largely a function of his renal disease and we will be checking an erythropoietin level.  I ask him to continue on with the same dose of lenvatinib.  We will reevaluate his disease status in another 2 to 3 months.  He will let us know in the interim if he develops any new or worsening symptoms.

## 2025-07-31 NOTE — LETTER
7/31/2025      Frandy Workman  [address] [facility] [address]      Dear Colleague,    Thank you for referring your patient, Frandy Workman, to the [facility]. Please see a copy of my visit note below.    Virtual Visit Details    Type of service:  Video Visit   Video Start Time: 930  Video End Time:1000  Originating Location (pt. Location): Home  Distant Location (provider location):  Off-site  Platform used for Video Visit: Odette    Miller Dejesus returns today in follow-up of metastatic hepatocellular carcinoma in the setting of a prior liver transplant.    He is 60-year-old gentleman diagnosed 6 years ago with 2 lesions in his cirrhotic liver which were treated with TACE followed by a transplant in 2019.  Peritoneal metastases developed in 2023 and initiated therapy with sorafenib.  He did not tolerate full doses due to severe diarrhea and nausea but subsequently tolerated 200 mg twice per day with good control of his disease for several years.  4 months ago he had evidence of disease progression and change therapy to lenvatinib which initially required some treatment interruptions and dose adjustments for toxicity.  2 months ago he had stable to marginally progressive disease and is continued on therapy at 8 mg/day, and is returning today for his next response assessment.    He reports that he has been feeling better than he has in some time.  He is feeling more energetic and is starting to get more exercise again and has been out walking in going to the gym to lift weights every other day.  He is sleeping better and been working hard on drinking more water.  He has been able to keep his diarrhea controlled.  He has not been aware of any bleeding.    Via the video link he actually appears well, better than I have seen him in some time.    I personally viewed his CT scan and over the results with him.  That shows his peritoneal disease to be stable and I do not see any new  sites of disease.    His lab work shows his AFP to have gone up a little bit at 11.  His electrolytes are normal and his chronic renal failure is stable with a creatinine of 2.06.  His bilirubin, albumin and liver enzymes are normal.  He has stable mild thrombocytopenia consistent with his prior portal hypertension, and his hemoglobin is drifting down a little bit more now at 8.4 with an MCV of 96.    Assessment/plan: Metastatic hepatocellular carcinoma in the setting of a prior liver transplant currently with stable disease on reduced dose lenvatinib.  On his current dose he seems to be tolerating this reasonably well and we have not gotten into trouble again with his diarrhea or worsening of his renal failure.  He is getting a little more anemic which I suspect is largely a function of his renal disease and we will be checking an erythropoietin level.  I ask him to continue on with the same dose of lenvatinib.  We will reevaluate his disease status in another 2 to 3 months.  He will let us know in the interim if he develops any new or worsening symptoms.        Again, thank you for allowing me to participate in the care of your patient.        Sincerely,        Miguel Villarreal MD    Electronically signed

## 2025-08-04 DIAGNOSIS — C78.6 MALIGNANT NEOPLASM METASTATIC TO PERITONEUM (H): ICD-10-CM

## 2025-08-04 DIAGNOSIS — C22.0 HCC (HEPATOCELLULAR CARCINOMA) (H): Primary | ICD-10-CM

## 2025-08-05 DIAGNOSIS — C22.0 HCC (HEPATOCELLULAR CARCINOMA) (H): Primary | ICD-10-CM

## 2025-08-05 DIAGNOSIS — C78.6 MALIGNANT NEOPLASM METASTATIC TO PERITONEUM (H): ICD-10-CM

## 2025-08-14 DIAGNOSIS — Z94.4 LIVER TRANSPLANT RECIPIENT (H): ICD-10-CM

## 2025-08-14 DIAGNOSIS — I12.9 HYPERTENSION, RENAL: ICD-10-CM

## 2025-08-14 RX ORDER — NIFEDIPINE 60 MG/1
60 TABLET, EXTENDED RELEASE ORAL 2 TIMES DAILY
Qty: 180 TABLET | Refills: 3 | Status: SHIPPED | OUTPATIENT
Start: 2025-08-14

## 2025-08-14 RX ORDER — CHOLECALCIFEROL (VITAMIN D3) 50 MCG
1 TABLET ORAL DAILY
Qty: 28 TABLET | Refills: 5 | Status: SHIPPED | OUTPATIENT
Start: 2025-08-14

## 2025-08-18 DIAGNOSIS — E11.3293 TYPE 2 DIABETES MELLITUS WITH MILD NONPROLIFERATIVE RETINOPATHY OF BOTH EYES WITHOUT MACULAR EDEMA, UNSPECIFIED WHETHER LONG TERM INSULIN USE (H): ICD-10-CM

## 2025-08-19 RX ORDER — ISOPROPYL ALCOHOL 70 ML/100ML
1 SWAB TOPICAL 4 TIMES DAILY
Qty: 400 EACH | Refills: 1 | Status: SHIPPED | OUTPATIENT
Start: 2025-08-19

## 2025-08-25 ENCOUNTER — TELEPHONE (OUTPATIENT)
Dept: ONCOLOGY | Facility: CLINIC | Age: 60
End: 2025-08-25
Payer: MEDICARE

## 2025-08-25 DIAGNOSIS — C22.0 HCC (HEPATOCELLULAR CARCINOMA) (H): ICD-10-CM

## 2025-08-25 DIAGNOSIS — C78.6 MALIGNANT NEOPLASM METASTATIC TO PERITONEUM (H): ICD-10-CM

## 2025-08-25 RX ORDER — PROCHLORPERAZINE MALEATE 10 MG
10 TABLET ORAL EVERY 6 HOURS PRN
Qty: 30 TABLET | Refills: 2 | Status: SHIPPED | OUTPATIENT
Start: 2025-08-25

## 2025-08-27 ENCOUNTER — LAB (OUTPATIENT)
Dept: LAB | Facility: CLINIC | Age: 60
End: 2025-08-27
Payer: MEDICARE

## 2025-08-27 ENCOUNTER — TELEPHONE (OUTPATIENT)
Dept: TRANSPLANT | Facility: CLINIC | Age: 60
End: 2025-08-27

## 2025-08-27 DIAGNOSIS — C78.6 MALIGNANT NEOPLASM METASTATIC TO PERITONEUM (H): ICD-10-CM

## 2025-08-27 DIAGNOSIS — Z94.4 LIVER REPLACED BY TRANSPLANT (H): ICD-10-CM

## 2025-08-27 DIAGNOSIS — N18.32 STAGE 3B CHRONIC KIDNEY DISEASE (H): ICD-10-CM

## 2025-08-27 DIAGNOSIS — N18.31 ANEMIA OF CHRONIC RENAL FAILURE, STAGE 3A (H): ICD-10-CM

## 2025-08-27 DIAGNOSIS — D63.1 ANEMIA OF CHRONIC RENAL FAILURE, STAGE 3A (H): ICD-10-CM

## 2025-08-27 DIAGNOSIS — Z79.899 ENCOUNTER FOR LONG-TERM (CURRENT) USE OF MEDICATIONS: ICD-10-CM

## 2025-08-27 DIAGNOSIS — C22.0 HCC (HEPATOCELLULAR CARCINOMA) (H): ICD-10-CM

## 2025-08-27 LAB
ALBUMIN MFR UR ELPH: 86.7 MG/DL
ALBUMIN SERPL BCG-MCNC: 4.4 G/DL (ref 3.5–5.2)
ALP SERPL-CCNC: 67 U/L (ref 40–150)
ALT SERPL W P-5'-P-CCNC: 16 U/L (ref 0–70)
ANION GAP SERPL CALCULATED.3IONS-SCNC: 18 MMOL/L (ref 7–15)
AST SERPL W P-5'-P-CCNC: 24 U/L (ref 0–45)
BASOPHILS # BLD AUTO: <0.03 10E3/UL (ref 0–0.2)
BASOPHILS NFR BLD AUTO: 0.2 %
BILIRUB SERPL-MCNC: 0.3 MG/DL
BILIRUBIN DIRECT (ROCHE PRO & PURE): 0.13 MG/DL (ref 0–0.45)
BUN SERPL-MCNC: 76.3 MG/DL (ref 8–23)
CALCIUM SERPL-MCNC: 9.6 MG/DL (ref 8.8–10.4)
CHLORIDE SERPL-SCNC: 98 MMOL/L (ref 98–107)
CREAT SERPL-MCNC: 2.71 MG/DL (ref 0.67–1.17)
CREAT UR-MCNC: 67.6 MG/DL
EGFRCR SERPLBLD CKD-EPI 2021: 26 ML/MIN/1.73M2
EOSINOPHIL # BLD AUTO: 0.14 10E3/UL (ref 0–0.7)
EOSINOPHIL NFR BLD AUTO: 2.2 %
ERYTHROCYTE [DISTWIDTH] IN BLOOD BY AUTOMATED COUNT: 14.1 % (ref 10–15)
GLUCOSE SERPL-MCNC: 178 MG/DL (ref 70–99)
HCO3 SERPL-SCNC: 24 MMOL/L (ref 22–29)
HCT VFR BLD AUTO: 30.4 % (ref 40–53)
HGB BLD-MCNC: 9.2 G/DL (ref 13.3–17.7)
IMM GRANULOCYTES # BLD: <0.03 10E3/UL
IMM GRANULOCYTES NFR BLD: 0.3 %
LYMPHOCYTES # BLD AUTO: 1.27 10E3/UL (ref 0.8–5.3)
LYMPHOCYTES NFR BLD AUTO: 19.6 %
MAGNESIUM SERPL-MCNC: 1.8 MG/DL (ref 1.7–2.3)
MCH RBC QN AUTO: 30.2 PG (ref 26.5–33)
MCHC RBC AUTO-ENTMCNC: 30.3 G/DL (ref 31.5–36.5)
MCV RBC AUTO: 99.7 FL (ref 78–100)
MONOCYTES # BLD AUTO: 0.59 10E3/UL (ref 0–1.3)
MONOCYTES NFR BLD AUTO: 9.1 %
NEUTROPHILS # BLD AUTO: 4.44 10E3/UL (ref 1.6–8.3)
NEUTROPHILS NFR BLD AUTO: 68.6 %
NRBC # BLD AUTO: <0.03 10E3/UL
NRBC BLD AUTO-RTO: 0 /100
PLATELET # BLD AUTO: 166 10E3/UL (ref 150–450)
POTASSIUM SERPL-SCNC: 4.2 MMOL/L (ref 3.4–5.3)
PROT SERPL-MCNC: 7.1 G/DL (ref 6.4–8.3)
PROT/CREAT 24H UR: 1.28 MG/MG CR (ref 0–0.2)
RBC # BLD AUTO: 3.05 10E6/UL (ref 4.4–5.9)
SODIUM SERPL-SCNC: 140 MMOL/L (ref 135–145)
T4 FREE SERPL-MCNC: 1.38 NG/DL (ref 0.9–1.7)
TSH SERPL DL<=0.005 MIU/L-ACNC: 4.25 UIU/ML (ref 0.3–4.2)
WBC # BLD AUTO: 6.47 10E3/UL (ref 4–11)

## 2025-08-28 LAB — EPO SERPL-ACNC: 45 MU/ML

## 2025-09-01 DIAGNOSIS — C22.0 HCC (HEPATOCELLULAR CARCINOMA) (H): Primary | ICD-10-CM

## 2025-09-01 DIAGNOSIS — C78.6 MALIGNANT NEOPLASM METASTATIC TO PERITONEUM (H): ICD-10-CM

## 2025-09-04 ENCOUNTER — LAB (OUTPATIENT)
Dept: LAB | Facility: CLINIC | Age: 60
End: 2025-09-04
Payer: MEDICARE

## 2025-09-04 DIAGNOSIS — Z79.899 ENCOUNTER FOR LONG-TERM (CURRENT) USE OF MEDICATIONS: ICD-10-CM

## 2025-09-04 DIAGNOSIS — C78.6 MALIGNANT NEOPLASM METASTATIC TO PERITONEUM (H): ICD-10-CM

## 2025-09-04 DIAGNOSIS — C22.0 HCC (HEPATOCELLULAR CARCINOMA) (H): ICD-10-CM

## 2025-09-04 LAB
ALBUMIN MFR UR ELPH: 40.7 MG/DL
ALBUMIN SERPL BCG-MCNC: 4.4 G/DL (ref 3.5–5.2)
ALP SERPL-CCNC: 61 U/L (ref 40–150)
ALT SERPL W P-5'-P-CCNC: 26 U/L (ref 0–70)
ANION GAP SERPL CALCULATED.3IONS-SCNC: 17 MMOL/L (ref 7–15)
AST SERPL W P-5'-P-CCNC: 30 U/L (ref 0–45)
ATRIAL RATE - MUSE: 71 BPM
BASOPHILS # BLD AUTO: <0.03 10E3/UL (ref 0–0.2)
BASOPHILS NFR BLD AUTO: 0.3 %
BILIRUB SERPL-MCNC: 0.2 MG/DL
BUN SERPL-MCNC: 76.7 MG/DL (ref 8–23)
CALCIUM SERPL-MCNC: 9.9 MG/DL (ref 8.8–10.4)
CHLORIDE SERPL-SCNC: 98 MMOL/L (ref 98–107)
CREAT SERPL-MCNC: 2.88 MG/DL (ref 0.67–1.17)
CREAT UR-MCNC: 33.8 MG/DL
DIASTOLIC BLOOD PRESSURE - MUSE: NORMAL MMHG
EGFRCR SERPLBLD CKD-EPI 2021: 24 ML/MIN/1.73M2
EOSINOPHIL # BLD AUTO: 0.04 10E3/UL (ref 0–0.7)
EOSINOPHIL NFR BLD AUTO: 0.5 %
ERYTHROCYTE [DISTWIDTH] IN BLOOD BY AUTOMATED COUNT: 14 % (ref 10–15)
GLUCOSE SERPL-MCNC: 81 MG/DL (ref 70–99)
HCO3 SERPL-SCNC: 21 MMOL/L (ref 22–29)
HCT VFR BLD AUTO: 27.7 % (ref 40–53)
HGB BLD-MCNC: 8.7 G/DL (ref 13.3–17.7)
IMM GRANULOCYTES # BLD: 0.06 10E3/UL
IMM GRANULOCYTES NFR BLD: 0.8 %
INTERPRETATION ECG - MUSE: NORMAL
LYMPHOCYTES # BLD AUTO: 0.39 10E3/UL (ref 0.8–5.3)
LYMPHOCYTES NFR BLD AUTO: 5.1 %
MAGNESIUM SERPL-MCNC: 1.7 MG/DL (ref 1.7–2.3)
MCH RBC QN AUTO: 30.3 PG (ref 26.5–33)
MCHC RBC AUTO-ENTMCNC: 31.4 G/DL (ref 31.5–36.5)
MCV RBC AUTO: 96.5 FL (ref 78–100)
MONOCYTES # BLD AUTO: 0.34 10E3/UL (ref 0–1.3)
MONOCYTES NFR BLD AUTO: 4.4 %
NEUTROPHILS # BLD AUTO: 6.83 10E3/UL (ref 1.6–8.3)
NEUTROPHILS NFR BLD AUTO: 88.9 %
NRBC # BLD AUTO: <0.03 10E3/UL
NRBC BLD AUTO-RTO: 0 /100
P AXIS - MUSE: 39 DEGREES
PLATELET # BLD AUTO: 159 10E3/UL (ref 150–450)
POTASSIUM SERPL-SCNC: 5 MMOL/L (ref 3.4–5.3)
PR INTERVAL - MUSE: 158 MS
PROT SERPL-MCNC: 7.3 G/DL (ref 6.4–8.3)
PROT/CREAT 24H UR: 1.2 MG/MG CR (ref 0–0.2)
QRS DURATION - MUSE: 76 MS
QT - MUSE: 386 MS
QTC - MUSE: 419 MS
R AXIS - MUSE: 35 DEGREES
RBC # BLD AUTO: 2.87 10E6/UL (ref 4.4–5.9)
SODIUM SERPL-SCNC: 136 MMOL/L (ref 135–145)
SYSTOLIC BLOOD PRESSURE - MUSE: NORMAL MMHG
T AXIS - MUSE: 42 DEGREES
TSH SERPL DL<=0.005 MIU/L-ACNC: 4 UIU/ML (ref 0.3–4.2)
VENTRICULAR RATE- MUSE: 71 BPM
WBC # BLD AUTO: 7.68 10E3/UL (ref 4–11)

## (undated) DEVICE — ENDO TROCAR FIRST ENTRY KII FIOS Z-THRD 05X100MM CTF03

## (undated) DEVICE — DRSG GAUZE 4X4" TRAY 6939

## (undated) DEVICE — RETR ELASTIC STAYS LONE STAR BLUNT DUAL LEAD 3550-1G

## (undated) DEVICE — SU PROLENE 5-0 RB-1DA 36"  8556H

## (undated) DEVICE — EYE SHIELD PLASTIC

## (undated) DEVICE — DEVICE SUTURE PASSER 14GA WECK EFX EFXSP2

## (undated) DEVICE — STRAP UNIVERSAL POSITIONING 2-PIECE 4X47X76" 91-287

## (undated) DEVICE — DEFIB PRO-PADZ LVP LQD GEL ADULT 8900-2105-01

## (undated) DEVICE — WIPE PREMOIST CLEANSING WASHCLOTHS 7988

## (undated) DEVICE — DRSG STERI STRIP 1/4X4" R1546

## (undated) DEVICE — GLOVE PROTEXIS MICRO 6.0  2D73PM60

## (undated) DEVICE — SU PROLENE 6-0 C-1DA 30" 8706H

## (undated) DEVICE — DRSG MEDIPORE 3 1/2X13 3/4" 3573

## (undated) DEVICE — NDL BLUNT 18GA 1" W/O FILTER 305181

## (undated) DEVICE — TAPE MICROPORE 2"X1.5YD 1530S-2

## (undated) DEVICE — BNDG ELASTIC 6"X5YDS STERILE 6611-6S

## (undated) DEVICE — SU PROLENE 5-0 RB-2DA 30" 8710H

## (undated) DEVICE — ESU GROUND PAD ADULT W/CORD E7507

## (undated) DEVICE — GLOVE SENSICARE 7.5 MSG1075 LATEX FREE

## (undated) DEVICE — SU ETHILON 5-0 PS-2 18" 1666H

## (undated) DEVICE — CANNULA VESSEL DLP  30001

## (undated) DEVICE — DRSG ABDOMINAL 07 1/2X8" 7197D

## (undated) DEVICE — EYE TIP IRRIGATION & ASPIRATION POLYMER 35D BENT 8065751511

## (undated) DEVICE — PUNCH AORTIC 4.0MMX8" RCB40

## (undated) DEVICE — SU SILK 0 TIE 6X30" A306H

## (undated) DEVICE — SOL WATER IRRIG 1000ML BOTTLE 2F7114

## (undated) DEVICE — SU PROLENE 7-0 BV-1DA 30" 8703H

## (undated) DEVICE — EYE CANN IRR 25GA CYSTOTOME 581610

## (undated) DEVICE — SU SILK 3-0 TIE 12X30" A304H

## (undated) DEVICE — SU SILK 2-0 SH CR 5X18" C0125

## (undated) DEVICE — ESU ELEC BLADE 2.75" COATED/INSULATED E1455

## (undated) DEVICE — DRSG DRAIN 2X2" 7087

## (undated) DEVICE — Device

## (undated) DEVICE — NIM ELEC SUBDERMAL NDL 3PAIR/BOX

## (undated) DEVICE — SU VICRYL 3-0 SH 27" UND J416H

## (undated) DEVICE — SU SILK 4-0 TIE 12X30" A303H

## (undated) DEVICE — SU ETHIBOND 0 CT-1 CR 8X18" CX21D

## (undated) DEVICE — DRAPE SLUSH/WARMER 66X44" ORS-320

## (undated) DEVICE — PREP CHLORAPREP 26ML TINTED ORANGE  260815

## (undated) DEVICE — PREP CHLORAPREP 26ML TINTED HI-LITE ORANGE 930815

## (undated) DEVICE — EYE PACK CUSTOM ANTERIOR 30DEG TIP CENTURION PPK6682-04

## (undated) DEVICE — STATLOCK PSI DEVICE

## (undated) DEVICE — BLADE CLIPPER SGL USE 9680

## (undated) DEVICE — GLOVE PROTEXIS MICRO 7.0  2D73PM70

## (undated) DEVICE — TRAY CATH 8IN 7FR ARROWG+ ARD CDC-45703-1A

## (undated) DEVICE — ESU ELEC BLADE 6" COATED/INSULATED E1455-6

## (undated) DEVICE — SPONGE LAP 18X18" X8435

## (undated) DEVICE — SU VICRYL 5-0 P-3 18" UND J493G

## (undated) DEVICE — ANTIFOG SOLUTION W/FOAM PAD 31142527

## (undated) DEVICE — SU VICRYL 4-0 RB-1 27" UND J214H

## (undated) DEVICE — SU VICRYL 0 CTX 36" J370H

## (undated) DEVICE — NIM PROBE PRASS INCREMENTING TIP 8225825

## (undated) DEVICE — DRSG TEGADERM 2 3/8X2 3/4" 1624W

## (undated) DEVICE — SU PROLENE 3-0 SHDA 36" 8522H

## (undated) DEVICE — BLOWER/MISTER CLEARVIEW 22150

## (undated) DEVICE — SYR 10ML SLIP TIP W/O NDL 303134

## (undated) DEVICE — SURGICEL HEMOSTAT 4X8" 1952

## (undated) DEVICE — ESU PENCIL SMOKE EVAC W/ROCKER SWITCH 0703-047-000

## (undated) DEVICE — SPECIMEN CONTAINER URINE 90ML STERILE 75.1435.002

## (undated) DEVICE — SU PROLENE 4-0 SHDA 36" 8521H

## (undated) DEVICE — DRAPE IOBAN INCISE 23X17" 6650EZ

## (undated) DEVICE — DRAPE SHEET REV FOLD 3/4 9349

## (undated) DEVICE — SU ETHIBOND 2-0 SHDA 30" X563H

## (undated) DEVICE — PROTECTOR ARM ONE-STEP TRENDELENBURG 40418

## (undated) DEVICE — EYE KNIFE STILETTO VISITEC 1.1MM ANG 45DEG SIDEPORT 376620

## (undated) DEVICE — INTRO SHEATH 9FRX10CM PINNACLE RSS902

## (undated) DEVICE — EYE NDL RETROBULBAR ATKINSON 25GA 1.5" 581637

## (undated) DEVICE — STPL SKIN 35W ROTATING HEAD PRW35

## (undated) DEVICE — SOL WATER IRRIG 500ML BOTTLE 2F7113

## (undated) DEVICE — SU STEEL MYO/WIRE II STERNOTOMY 8 BE-1 3X14" 048-217

## (undated) DEVICE — SYR 10ML LL W/O NDL 302995

## (undated) DEVICE — SOL NACL 0.9% IRRIG 1000ML BOTTLE 2F7124

## (undated) DEVICE — DRAIN JACKSON PRATT ROUND SIL 19FR W/TROCAR LF JP-2232

## (undated) DEVICE — LINEN TOWEL PACK X30 5481

## (undated) DEVICE — SU ETHILON 6-0 P-1 18" CLR 689G

## (undated) DEVICE — LINEN TOWEL PACK X6 WHITE 5487

## (undated) DEVICE — DRAIN JACKSON PRATT RESERVOIR 100ML SU130-1305

## (undated) DEVICE — SYR 03ML LL W/O NDL 309657

## (undated) DEVICE — TAPE MICROPORE 1"X1.5YD 1530S-1

## (undated) DEVICE — SUCTION CATH AIRLIFE TRI-FLO W/CONTROL PORT 14FR  T60C

## (undated) DEVICE — SUCTION TIP POOLE K770

## (undated) DEVICE — LABEL MEDICATION SYSTEM 3303-P

## (undated) DEVICE — WIRE GUIDE 0.035"X260CM SAFE-T-J EXCHANGE G00517

## (undated) DEVICE — DRSG DRAIN 4X4" 7086

## (undated) DEVICE — BNDG ELASTIC 4"X5YDS STERILE 6611-4S

## (undated) DEVICE — LINEN TOWEL PACK X5 5464

## (undated) DEVICE — WIPES FOLEY CARE SURESTEP PROVON DFC100

## (undated) DEVICE — DRAIN CHEST TUBE 32FR STR 8032

## (undated) DEVICE — SU MONOCRYL 4-0 PS-2 27" UND Y426H

## (undated) DEVICE — EYE KNIFE SLIT XSTAR VISITEC 2.5MM 45DEG BEVEL UP 373725

## (undated) DEVICE — PAD MAGNETIC INST MED STRL 200-16B

## (undated) DEVICE — GLOVE PROTEXIS MICRO 7.5  2D73PM75

## (undated) DEVICE — SU ETHILON 3-0 PS-1 18" 1663H

## (undated) DEVICE — LEFT HEART PK FAIRVIEW UNIV MEDICAL CENTER

## (undated) DEVICE — ESU PENCIL W/COATED BLADE E2450H

## (undated) DEVICE — SU VICRYL 3-0 SH 27" J316H

## (undated) DEVICE — SU PROLENE 6-0 RB-2DA 30" 8711H

## (undated) DEVICE — SU SILK 1 TIE 6X30" A307H

## (undated) DEVICE — CLIP HORIZON SM RED WIDE SLOT 001201

## (undated) DEVICE — SU VICRYL 3-0 FS-1 27" J442H

## (undated) DEVICE — FASTENER CATH BALLOON CLAMPX2 STATLOCK 0684-00-493

## (undated) DEVICE — PACK ADULT HEART UMMC PV15CG92D

## (undated) DEVICE — ESU HANDPIECE ABC BEND-A-BEAM 6" 134006

## (undated) DEVICE — DECANTER VIAL 2006S

## (undated) DEVICE — SU VICRYL 1 CT-1 27" UND J261H

## (undated) DEVICE — NDL 30GA 0.5" 305106

## (undated) DEVICE — DRAIN CHEST TUBE RIGHT ANGLED 28FR 8128

## (undated) DEVICE — NDL COUNTER 40CT  31142311

## (undated) DEVICE — DRSG TEGADERM 8X12" 1629

## (undated) DEVICE — EYE CANN IRR 27GA ANTERIOR CHAMBER 581280

## (undated) DEVICE — SU PLAIN FAST ABSORB 6-0 PC-1 18" 1916G

## (undated) DEVICE — PACK CATARACT CUSTOM ASC SEY15CPUMC

## (undated) DEVICE — SUCTION DRY CHEST DRAIN OASIS 3600-100

## (undated) DEVICE — STPL ENDO ARTICULATING 45MM EC45A

## (undated) DEVICE — SU PDS II 0 CTX 36" Z370T

## (undated) DEVICE — SU PROLENE 4-0 RB-1DA 36" 8557H

## (undated) DEVICE — SU SILK 2-0 TIE 12X30" A305H

## (undated) DEVICE — PACK NEURO MINOR UMMC SNE32MNMU4

## (undated) DEVICE — TAPE MEDIPORE 4"X2YD 2864

## (undated) DEVICE — CLIP HORIZON LG ORANGE 004200

## (undated) DEVICE — BASIN SET SINGLE STERILE 13752-624

## (undated) DEVICE — ESU HOLSTER PLASTIC DISP E2400

## (undated) DEVICE — SU DERMABOND ADVANCED .7ML DNX12

## (undated) DEVICE — EYE RING MALYUGIN PUPIL EXPANDER 7.0MM MAL-000-2

## (undated) DEVICE — CATH US OD 5FR OD SEC 2.9FR EAGLE EYE PLATINUM 0.014 85900P

## (undated) DEVICE — ESU LIGASURE IMPACT OPEN SEALER/DVDR CVD LG JAW LF4418

## (undated) DEVICE — SUCTION TIP YANKAUER STR K87

## (undated) DEVICE — BLADE KNIFE SURG 12 371112

## (undated) DEVICE — DRSG PRIMAPORE 02X3" 7133

## (undated) DEVICE — SU SILK 3-0 SH 30" K832H

## (undated) DEVICE — TUBE FEEDING NEOCONNECT POLY ENFIT 8FR 24" PFTM8.0P-NC

## (undated) DEVICE — PREP POVIDONE IODINE SOLUTION 10% 120ML

## (undated) DEVICE — SU PROLENE 6-0 C-1DA 4X24" M8726

## (undated) DEVICE — PACK HEART LEFT CUSTOM

## (undated) DEVICE — SOL NACL 0.9% IRRIG 3000ML BAG 2B7477

## (undated) DEVICE — SU MONOCRYL 4-0 PS-2 18" UND Y496G

## (undated) DEVICE — SU PROLENE 8-0 BV130-5DA 24" 8732H

## (undated) DEVICE — SU VICRYL 2-0 CT-1 27" UND J259H

## (undated) DEVICE — POSITIONER ASSIST ESSTECH 3S T401210S

## (undated) DEVICE — KIT HAND CONTROL ACIST 014644 AR-P54

## (undated) DEVICE — LINEN GOWN XLG 5407

## (undated) DEVICE — DRSG MEPILEX BORDER SACRUM 6.3X7.9" 282055

## (undated) DEVICE — GLOVE BIOGEL PI ULTRATOUCH G SZ 7.5 42175

## (undated) DEVICE — SU SILK 3-0 SH CR 8X18" C013D

## (undated) DEVICE — CATH GUIDING BLUE YELLOW PTFE XB3.5 6FRX100CM 67005400

## (undated) DEVICE — ADH SKIN CLOSURE PREMIERPRO EXOFIN 1.0ML 3470

## (undated) DEVICE — SU STEEL 6 CCS 4X18" M654G

## (undated) DEVICE — NDL 18GA 1.5" 305196

## (undated) DEVICE — SUCTION MANIFOLD NEPTUNE 2 SYS 4 PORT 0702-020-000

## (undated) DEVICE — DRSG TELFA 3X8" 1238

## (undated) DEVICE — BLADE KNIFE BEAVER MINI STR BEAVER6900

## (undated) DEVICE — PEN MARKING SKIN TYCO DEVON DUAL TIP 31145868

## (undated) DEVICE — SU PROLENE 7-0 BV-1DA 4X24" M8702

## (undated) DEVICE — BLADE SAW STERNAL 20X30MM KM-32

## (undated) DEVICE — WIRE GUIDE 0.035"X145CM AMPLATZ XSTIFF J THSCF-35-145-3-AES

## (undated) DEVICE — SU ETHILON 9-0 BV130-4 5" 2813G

## (undated) DEVICE — CLIP SPRING FOGARTY SOFTJAW CSOFT6

## (undated) DEVICE — TUBING SUCTION DRAINAGE PLEURAL DUAL 8884714200

## (undated) DEVICE — GLOVE PROTEXIS BLUE W/NEU-THERA 7.5  2D73EB75

## (undated) DEVICE — SURGICEL FIBRILLAR HEMOSTAT 4"X4" 1963

## (undated) DEVICE — VALVE HEMOSTASIS .096" COPILOT MECH 1003331

## (undated) DEVICE — SLEEVE TR BAND RADIAL COMPRESSION DEVICE 24CM TRB24-REG

## (undated) DEVICE — ENDO POUCH UNIV RETRIEVAL SYSTEM INZII 10MM CD001

## (undated) DEVICE — CLIP HORIZON MED BLUE 002200

## (undated) DEVICE — DRAIN JACKSON PRATT RESERVOIR 400ML SU130-1000

## (undated) DEVICE — GLOVE BIOGEL PI MICRO INDICATOR UNDERGLOVE SZ 8.0 48980

## (undated) DEVICE — ESU HARMONIC ACE LAP SHEARS STRYKER ACE+ 7 5MMX36CM HARH36

## (undated) DEVICE — SUCTION SLEEVE NEPTUNE 2 165MM 0703-005-165

## (undated) DEVICE — SU SILK 6-0 G-1DA 18" 780G

## (undated) DEVICE — DRAIN JACKSON PRATT 10MM FLAT 4/4 PERF SU130-1311

## (undated) DEVICE — SU PDS II 6-0 RB-2DA 30" Z149H

## (undated) DEVICE — SPONGE SURGIFOAM 100 1974

## (undated) DEVICE — EYE PREP BETADINE 5% SOLUTION 30ML 0065-0411-30

## (undated) DEVICE — TUBING SMOKE EVAC PNEUMOCLEAR HIGH FLOW 0620050250

## (undated) DEVICE — SU ETHILON 4-0 P-3 18" BLACK 699G

## (undated) DEVICE — DRSG JAWBRA  95

## (undated) DEVICE — GLOVE PROTEXIS BLUE W/NEU-THERA 6.5  2D73EB65

## (undated) DEVICE — BLADE KNIFE BEAVER MICROSHARP GREEN 377515

## (undated) DEVICE — TUBING IV EXT SET MICRO VOLUME MALE LL ADAPTER 36" 2N3345

## (undated) DEVICE — DRSG PRIMAPORE 03 1/8X6" 66000318

## (undated) DEVICE — LEAD PACER MYOCARDIAL BIPOLAR TEMPORARY 53CM 6495F

## (undated) DEVICE — SUCTION SLEEVE NEPTUNE 2 125MM 0703-005-125

## (undated) DEVICE — PREP SKIN SCRUB TRAY 4461A

## (undated) DEVICE — SOL WATER 10ML VIAL 6332318510

## (undated) DEVICE — APPLICATOR COTTON TIP 6"X2 STERILE LF 6012

## (undated) DEVICE — MANIFOLD KIT ANGIO AUTOMATED 014613

## (undated) DEVICE — ESU ELEC BLADE 6" COATED E1450-6

## (undated) DEVICE — CATH TRAY FOLEY SURESTEP 16FR W/URNE MTR STLK LATEX A303316A

## (undated) DEVICE — SU VICRYL 5-0 P-2 18" UND J503G

## (undated) DEVICE — TEST TUBE W/SCREW CAP 17361

## (undated) DEVICE — CATH TRAY FOLEY SURESTEP 16FR W/TMP PRB STLK LATEX A319416AM

## (undated) DEVICE — NDL COUNTER 20CT 31142493

## (undated) DEVICE — TUBING INSUFFLATION W/FILTER CPC TO LUER 620-030-301

## (undated) DEVICE — BLADE KNIFE SURG 15 371115

## (undated) DEVICE — INTRO GLIDESHEATH SLENDER 6FR 10X45CM 60-1060

## (undated) DEVICE — SPONGE RAY-TEC 4X8" 7318

## (undated) DEVICE — ENDO TROCAR FIRST ENTRY KII FIOS ADV FIX 12X100MM CFF73

## (undated) DEVICE — SPECIMEN CONTAINER 5OZ STERILE 2600SA

## (undated) DEVICE — TUBING PRESSURE 30"

## (undated) DEVICE — ESU GROUND PAD THERMOGUARD PLUS ABC PEDS 7-382

## (undated) DEVICE — GUIDEWIRE VASC 0.014INX180CM RUNTHROUGH 25-1011

## (undated) DEVICE — SUCTION ANTICOAGULATION ASSEMBLY BT725

## (undated) DEVICE — KIT ENDO VASOVIEW HEMOPRO 2 VH-4000

## (undated) DEVICE — SURGICEL ABSORBABLE HEMOSTAT SNOW 4"X4" 2083

## (undated) DEVICE — SPONGE KITTNER 30-101

## (undated) DEVICE — PACK MINOR EYE CUSTOM ASC

## (undated) DEVICE — DECANTER BAG 2002S

## (undated) DEVICE — INTRO SHEATH AVANTI 4FRX23CM 504604T

## (undated) DEVICE — SU VICRYL 3-0 SH 8X18" UND J864D

## (undated) DEVICE — SUCTION MANIFOLD DORNOCH ULTRA CART UL-CL500

## (undated) DEVICE — ENDO TROCAR SLEEVE KII Z-THREADED 05X100MM CTS02

## (undated) DEVICE — SYR 01ML 27GA 0.5" NDL TBC 309623

## (undated) DEVICE — SU ETHIBOND 3-0 BBDA 36" X588H

## (undated) DEVICE — PREP POVIDONE IODINE SCRUB 7.5% 120ML

## (undated) DEVICE — TIES BANDING T50R

## (undated) DEVICE — SOL NACL 0.9% 10ML VIAL 0409-4888-02

## (undated) DEVICE — ESU ENDO SCISSORS 5MM CVD 5DCS

## (undated) DEVICE — SU PDS II 5-0 RB-2DA 30" Z148H

## (undated) DEVICE — SU PLEDGET SOFT TFE 3/8"X3/26"X1/16" PCP40

## (undated) DEVICE — ESU EYE HIGH TEMP 65410-183

## (undated) RX ORDER — GLYCOPYRROLATE 0.2 MG/ML
INJECTION, SOLUTION INTRAMUSCULAR; INTRAVENOUS
Status: DISPENSED
Start: 2023-07-13

## (undated) RX ORDER — FENTANYL CITRATE 50 UG/ML
INJECTION, SOLUTION INTRAMUSCULAR; INTRAVENOUS
Status: DISPENSED
Start: 2019-01-23

## (undated) RX ORDER — CEFAZOLIN SODIUM 1 G/3ML
INJECTION, POWDER, FOR SOLUTION INTRAMUSCULAR; INTRAVENOUS
Status: DISPENSED
Start: 2017-11-02

## (undated) RX ORDER — NICARDIPINE HCL-0.9% SOD CHLOR 1 MG/10 ML
SYRINGE (ML) INTRAVENOUS
Status: DISPENSED
Start: 2021-07-12

## (undated) RX ORDER — FENTANYL CITRATE 50 UG/ML
INJECTION, SOLUTION INTRAMUSCULAR; INTRAVENOUS
Status: DISPENSED
Start: 2023-07-13

## (undated) RX ORDER — DOBUTAMINE HYDROCHLORIDE 200 MG/100ML
INJECTION INTRAVENOUS
Status: DISPENSED
Start: 2019-02-04

## (undated) RX ORDER — HYDROMORPHONE HYDROCHLORIDE 1 MG/ML
INJECTION, SOLUTION INTRAMUSCULAR; INTRAVENOUS; SUBCUTANEOUS
Status: DISPENSED
Start: 2019-11-12

## (undated) RX ORDER — ATROPINE SULFATE 0.4 MG/ML
AMPUL (ML) INJECTION
Status: DISPENSED
Start: 2019-02-04

## (undated) RX ORDER — PHENYLEPHRINE HCL IN 0.9% NACL 1 MG/10 ML
SYRINGE (ML) INTRAVENOUS
Status: DISPENSED
Start: 2019-02-26

## (undated) RX ORDER — BACITRACIN 500 [USP'U]/G
OINTMENT OPHTHALMIC
Status: DISPENSED
Start: 2017-11-02

## (undated) RX ORDER — FENTANYL CITRATE-0.9 % NACL/PF 10 MCG/ML
PLASTIC BAG, INJECTION (ML) INTRAVENOUS
Status: DISPENSED
Start: 2023-07-13

## (undated) RX ORDER — PROPOFOL 10 MG/ML
INJECTION, EMULSION INTRAVENOUS
Status: DISPENSED
Start: 2023-01-24

## (undated) RX ORDER — FENTANYL CITRATE 50 UG/ML
INJECTION, SOLUTION INTRAMUSCULAR; INTRAVENOUS
Status: DISPENSED
Start: 2019-11-12

## (undated) RX ORDER — FENTANYL CITRATE 50 UG/ML
INJECTION, SOLUTION INTRAMUSCULAR; INTRAVENOUS
Status: DISPENSED
Start: 2017-11-17

## (undated) RX ORDER — HEPARIN SODIUM 5000 [USP'U]/.5ML
INJECTION, SOLUTION INTRAVENOUS; SUBCUTANEOUS
Status: DISPENSED
Start: 2023-07-13

## (undated) RX ORDER — FENTANYL CITRATE 50 UG/ML
INJECTION, SOLUTION INTRAMUSCULAR; INTRAVENOUS
Status: DISPENSED
Start: 2018-12-28

## (undated) RX ORDER — CEFAZOLIN SODIUM 2 G/100ML
INJECTION, SOLUTION INTRAVENOUS
Status: DISPENSED
Start: 2017-11-02

## (undated) RX ORDER — HYDROMORPHONE HYDROCHLORIDE 1 MG/ML
INJECTION, SOLUTION INTRAMUSCULAR; INTRAVENOUS; SUBCUTANEOUS
Status: DISPENSED
Start: 2019-01-23

## (undated) RX ORDER — FENTANYL CITRATE 50 UG/ML
INJECTION, SOLUTION INTRAMUSCULAR; INTRAVENOUS
Status: DISPENSED
Start: 2020-02-13

## (undated) RX ORDER — ONDANSETRON 2 MG/ML
INJECTION INTRAMUSCULAR; INTRAVENOUS
Status: DISPENSED
Start: 2023-01-24

## (undated) RX ORDER — PROPOFOL 10 MG/ML
INJECTION, EMULSION INTRAVENOUS
Status: DISPENSED
Start: 2023-07-13

## (undated) RX ORDER — PROPOFOL 10 MG/ML
INJECTION, EMULSION INTRAVENOUS
Status: DISPENSED
Start: 2021-07-14

## (undated) RX ORDER — LIDOCAINE HYDROCHLORIDE 10 MG/ML
INJECTION, SOLUTION EPIDURAL; INFILTRATION; INTRACAUDAL; PERINEURAL
Status: DISPENSED
Start: 2019-01-22

## (undated) RX ORDER — LIDOCAINE HYDROCHLORIDE 20 MG/ML
INJECTION, SOLUTION EPIDURAL; INFILTRATION; INTRACAUDAL; PERINEURAL
Status: DISPENSED
Start: 2021-07-14

## (undated) RX ORDER — HEPARIN SODIUM 10000 [USP'U]/100ML
INJECTION, SOLUTION INTRAVENOUS
Status: DISPENSED
Start: 2021-07-12

## (undated) RX ORDER — FENTANYL CITRATE 50 UG/ML
INJECTION, SOLUTION INTRAMUSCULAR; INTRAVENOUS
Status: DISPENSED
Start: 2019-12-06

## (undated) RX ORDER — LIDOCAINE HYDROCHLORIDE 20 MG/ML
INJECTION, SOLUTION EPIDURAL; INFILTRATION; INTRACAUDAL; PERINEURAL
Status: DISPENSED
Start: 2019-11-11

## (undated) RX ORDER — CEFAZOLIN SODIUM 1 G/3ML
INJECTION, POWDER, FOR SOLUTION INTRAMUSCULAR; INTRAVENOUS
Status: DISPENSED
Start: 2021-07-14

## (undated) RX ORDER — PAPAVERINE HYDROCHLORIDE 30 MG/ML
INJECTION INTRAMUSCULAR; INTRAVENOUS
Status: DISPENSED
Start: 2021-07-14

## (undated) RX ORDER — MINERAL OIL
OIL (ML) MISCELLANEOUS
Status: DISPENSED
Start: 2017-11-02

## (undated) RX ORDER — ALBUMIN, HUMAN INJ 5% 5 %
SOLUTION INTRAVENOUS
Status: DISPENSED
Start: 2019-11-11

## (undated) RX ORDER — PROPOFOL 10 MG/ML
INJECTION, EMULSION INTRAVENOUS
Status: DISPENSED
Start: 2023-01-03

## (undated) RX ORDER — HEPARIN SODIUM 1000 [USP'U]/ML
INJECTION, SOLUTION INTRAVENOUS; SUBCUTANEOUS
Status: DISPENSED
Start: 2021-07-14

## (undated) RX ORDER — DIPHENHYDRAMINE HYDROCHLORIDE 50 MG/ML
INJECTION INTRAMUSCULAR; INTRAVENOUS
Status: DISPENSED
Start: 2019-12-06

## (undated) RX ORDER — SODIUM CHLORIDE 9 MG/ML
INJECTION, SOLUTION INTRAVENOUS
Status: DISPENSED
Start: 2017-10-11

## (undated) RX ORDER — ONDANSETRON 2 MG/ML
INJECTION INTRAMUSCULAR; INTRAVENOUS
Status: DISPENSED
Start: 2019-01-22

## (undated) RX ORDER — PROPOFOL 10 MG/ML
INJECTION, EMULSION INTRAVENOUS
Status: DISPENSED
Start: 2017-11-02

## (undated) RX ORDER — HYDRALAZINE HYDROCHLORIDE 20 MG/ML
INJECTION INTRAMUSCULAR; INTRAVENOUS
Status: DISPENSED
Start: 2019-11-12

## (undated) RX ORDER — FENTANYL CITRATE 50 UG/ML
INJECTION, SOLUTION INTRAMUSCULAR; INTRAVENOUS
Status: DISPENSED
Start: 2021-07-14

## (undated) RX ORDER — LIDOCAINE HYDROCHLORIDE 10 MG/ML
INJECTION, SOLUTION EPIDURAL; INFILTRATION; INTRACAUDAL; PERINEURAL
Status: DISPENSED
Start: 2017-11-07

## (undated) RX ORDER — FENTANYL CITRATE 50 UG/ML
INJECTION, SOLUTION INTRAMUSCULAR; INTRAVENOUS
Status: DISPENSED
Start: 2019-02-26

## (undated) RX ORDER — TETRACAINE HYDROCHLORIDE 5 MG/ML
SOLUTION OPHTHALMIC
Status: DISPENSED
Start: 2017-11-16

## (undated) RX ORDER — SODIUM CHLORIDE 9 MG/ML
INJECTION, SOLUTION INTRAVENOUS
Status: DISPENSED
Start: 2019-02-26

## (undated) RX ORDER — LIDOCAINE HYDROCHLORIDE 20 MG/ML
INJECTION, SOLUTION EPIDURAL; INFILTRATION; INTRACAUDAL; PERINEURAL
Status: DISPENSED
Start: 2017-11-02

## (undated) RX ORDER — LIDOCAINE HYDROCHLORIDE 20 MG/ML
INJECTION, SOLUTION INFILTRATION; PERINEURAL
Status: DISPENSED
Start: 2018-09-19

## (undated) RX ORDER — FENTANYL CITRATE 50 UG/ML
INJECTION, SOLUTION INTRAMUSCULAR; INTRAVENOUS
Status: DISPENSED
Start: 2017-10-11

## (undated) RX ORDER — NITROGLYCERIN 5 MG/ML
VIAL (ML) INTRAVENOUS
Status: DISPENSED
Start: 2021-07-12

## (undated) RX ORDER — NITROGLYCERIN 5 MG/ML
VIAL (ML) INTRAVENOUS
Status: DISPENSED
Start: 2019-02-26

## (undated) RX ORDER — PROPOFOL 10 MG/ML
INJECTION, EMULSION INTRAVENOUS
Status: DISPENSED
Start: 2019-11-11

## (undated) RX ORDER — HEPARIN SODIUM 1000 [USP'U]/ML
INJECTION, SOLUTION INTRAVENOUS; SUBCUTANEOUS
Status: DISPENSED
Start: 2019-02-26

## (undated) RX ORDER — FENTANYL CITRATE 50 UG/ML
INJECTION, SOLUTION INTRAMUSCULAR; INTRAVENOUS
Status: DISPENSED
Start: 2019-01-22

## (undated) RX ORDER — FENTANYL CITRATE 50 UG/ML
INJECTION, SOLUTION INTRAMUSCULAR; INTRAVENOUS
Status: DISPENSED
Start: 2017-11-02

## (undated) RX ORDER — ETOMIDATE 2 MG/ML
INJECTION INTRAVENOUS
Status: DISPENSED
Start: 2019-11-11

## (undated) RX ORDER — PHENYLEPHRINE HCL IN 0.9% NACL 1 MG/10 ML
SYRINGE (ML) INTRAVENOUS
Status: DISPENSED
Start: 2017-11-02

## (undated) RX ORDER — ONDANSETRON 2 MG/ML
INJECTION INTRAMUSCULAR; INTRAVENOUS
Status: DISPENSED
Start: 2017-11-02

## (undated) RX ORDER — CEFOXITIN 2 G/1
INJECTION, POWDER, FOR SOLUTION INTRAVENOUS
Status: DISPENSED
Start: 2023-07-13

## (undated) RX ORDER — ONDANSETRON 2 MG/ML
INJECTION INTRAMUSCULAR; INTRAVENOUS
Status: DISPENSED
Start: 2021-07-14

## (undated) RX ORDER — AMPICILLIN AND SULBACTAM 2; 1 G/1; G/1
INJECTION, POWDER, FOR SOLUTION INTRAMUSCULAR; INTRAVENOUS
Status: DISPENSED
Start: 2019-01-22

## (undated) RX ORDER — BUPIVACAINE HYDROCHLORIDE 5 MG/ML
INJECTION, SOLUTION EPIDURAL; INTRACAUDAL
Status: DISPENSED
Start: 2017-11-16

## (undated) RX ORDER — PAPAVERINE HYDROCHLORIDE 30 MG/ML
INJECTION INTRAMUSCULAR; INTRAVENOUS
Status: DISPENSED
Start: 2017-11-02

## (undated) RX ORDER — FENTANYL CITRATE 50 UG/ML
INJECTION, SOLUTION INTRAMUSCULAR; INTRAVENOUS
Status: DISPENSED
Start: 2021-07-12

## (undated) RX ORDER — BALANCED SALT SOLUTION 6.4; .75; .48; .3; 3.9; 1.7 MG/ML; MG/ML; MG/ML; MG/ML; MG/ML; MG/ML
SOLUTION OPHTHALMIC
Status: DISPENSED
Start: 2017-11-16

## (undated) RX ORDER — SODIUM CHLORIDE 9 MG/ML
INJECTION, SOLUTION INTRAVENOUS
Status: DISPENSED
Start: 2019-01-22

## (undated) RX ORDER — LABETALOL 20 MG/4 ML (5 MG/ML) INTRAVENOUS SYRINGE
Status: DISPENSED
Start: 2019-11-12

## (undated) RX ORDER — LIDOCAINE HYDROCHLORIDE 10 MG/ML
INJECTION, SOLUTION EPIDURAL; INFILTRATION; INTRACAUDAL; PERINEURAL
Status: DISPENSED
Start: 2017-10-11

## (undated) RX ORDER — CARDIOPLEG/ORGAN PRESERV NO.1 9-198-2-1
BOTTLE PERFUSION
Status: DISPENSED
Start: 2019-11-11

## (undated) RX ORDER — ALBUMIN, HUMAN INJ 5% 5 %
SOLUTION INTRAVENOUS
Status: DISPENSED
Start: 2017-11-02

## (undated) RX ORDER — SCOLOPAMINE TRANSDERMAL SYSTEM 1 MG/1
PATCH, EXTENDED RELEASE TRANSDERMAL
Status: DISPENSED
Start: 2019-01-22

## (undated) RX ORDER — ERYTHROMYCIN 5 MG/G
OINTMENT OPHTHALMIC
Status: DISPENSED
Start: 2017-11-16

## (undated) RX ORDER — LIDOCAINE HYDROCHLORIDE 20 MG/ML
SOLUTION OROPHARYNGEAL
Status: DISPENSED
Start: 2019-12-03

## (undated) RX ORDER — LIDOCAINE HYDROCHLORIDE 20 MG/ML
INJECTION, SOLUTION EPIDURAL; INFILTRATION; INTRACAUDAL; PERINEURAL
Status: DISPENSED
Start: 2017-11-16

## (undated) RX ORDER — HEPARIN SODIUM 1000 [USP'U]/ML
INJECTION, SOLUTION INTRAVENOUS; SUBCUTANEOUS
Status: DISPENSED
Start: 2019-11-11

## (undated) RX ORDER — CEFOXITIN 1 G/1
INJECTION, POWDER, FOR SOLUTION INTRAVENOUS
Status: DISPENSED
Start: 2023-07-13

## (undated) RX ORDER — GLYCOPYRROLATE 0.2 MG/ML
INJECTION, SOLUTION INTRAMUSCULAR; INTRAVENOUS
Status: DISPENSED
Start: 2019-11-11

## (undated) RX ORDER — CEFAZOLIN SODIUM 1 G/3ML
INJECTION, POWDER, FOR SOLUTION INTRAMUSCULAR; INTRAVENOUS
Status: DISPENSED
Start: 2019-11-11

## (undated) RX ORDER — ACETAMINOPHEN 325 MG/1
TABLET ORAL
Status: DISPENSED
Start: 2023-01-24

## (undated) RX ORDER — MUPIROCIN 20 MG/G
OINTMENT TOPICAL
Status: DISPENSED
Start: 2017-11-02

## (undated) RX ORDER — LIDOCAINE HYDROCHLORIDE 10 MG/ML
INJECTION, SOLUTION EPIDURAL; INFILTRATION; INTRACAUDAL; PERINEURAL
Status: DISPENSED
Start: 2017-11-06

## (undated) RX ORDER — DIPHENHYDRAMINE HYDROCHLORIDE 50 MG/ML
INJECTION INTRAMUSCULAR; INTRAVENOUS
Status: DISPENSED
Start: 2017-11-17

## (undated) RX ORDER — NICARDIPINE HYDROCHLORIDE 2.5 MG/ML
INJECTION INTRAVENOUS
Status: DISPENSED
Start: 2019-02-26

## (undated) RX ORDER — FENTANYL CITRATE 50 UG/ML
INJECTION, SOLUTION INTRAMUSCULAR; INTRAVENOUS
Status: DISPENSED
Start: 2019-11-11

## (undated) RX ORDER — OXYMETAZOLINE HYDROCHLORIDE 0.05 G/100ML
SPRAY NASAL
Status: DISPENSED
Start: 2017-11-02

## (undated) RX ORDER — LIDOCAINE HYDROCHLORIDE 10 MG/ML
INJECTION, SOLUTION INFILTRATION; PERINEURAL
Status: DISPENSED
Start: 2019-01-23

## (undated) RX ORDER — FENTANYL CITRATE-0.9 % NACL/PF 10 MCG/ML
PLASTIC BAG, INJECTION (ML) INTRAVENOUS
Status: DISPENSED
Start: 2021-07-14

## (undated) RX ORDER — LIDOCAINE HYDROCHLORIDE AND EPINEPHRINE 10; 10 MG/ML; UG/ML
INJECTION, SOLUTION INFILTRATION; PERINEURAL
Status: DISPENSED
Start: 2018-09-19

## (undated) RX ORDER — METOPROLOL TARTRATE 1 MG/ML
INJECTION, SOLUTION INTRAVENOUS
Status: DISPENSED
Start: 2019-02-04

## (undated) RX ORDER — DEXAMETHASONE SODIUM PHOSPHATE 4 MG/ML
INJECTION, SOLUTION INTRA-ARTICULAR; INTRALESIONAL; INTRAMUSCULAR; INTRAVENOUS; SOFT TISSUE
Status: DISPENSED
Start: 2017-11-02

## (undated) RX ORDER — ONDANSETRON 2 MG/ML
INJECTION INTRAMUSCULAR; INTRAVENOUS
Status: DISPENSED
Start: 2023-07-13

## (undated) RX ORDER — SODIUM CHLORIDE 9 MG/ML
INJECTION, SOLUTION INTRAVENOUS
Status: DISPENSED
Start: 2019-01-23

## (undated) RX ORDER — ALBUMIN, HUMAN INJ 5% 5 %
SOLUTION INTRAVENOUS
Status: DISPENSED
Start: 2021-07-14